# Patient Record
Sex: FEMALE | Race: BLACK OR AFRICAN AMERICAN | NOT HISPANIC OR LATINO | Employment: OTHER | URBAN - METROPOLITAN AREA
[De-identification: names, ages, dates, MRNs, and addresses within clinical notes are randomized per-mention and may not be internally consistent; named-entity substitution may affect disease eponyms.]

---

## 2017-01-03 ENCOUNTER — TELEPHONE (OUTPATIENT)
Dept: NEPHROLOGY | Facility: CLINIC | Age: 62
End: 2017-01-03

## 2017-01-03 DIAGNOSIS — F41.9 ANXIETY: Chronic | ICD-10-CM

## 2017-01-03 NOTE — TELEPHONE ENCOUNTER
----- Message from Deysi Mora sent at 1/3/2017  2:43 PM CST -----  Dottie with [facility] at [phone]//[facility] is calling to check on the status of a refill request for med//Hydreledine 50mg tabs//pt's insurance is requesting a 90 day supply//please call//racquel/jennie

## 2017-01-04 ENCOUNTER — TELEPHONE (OUTPATIENT)
Dept: INTERNAL MEDICINE | Facility: CLINIC | Age: 62
End: 2017-01-04

## 2017-01-04 RX ORDER — BUSPIRONE HYDROCHLORIDE 10 MG/1
TABLET ORAL
Qty: 90 TABLET | Refills: 5 | Status: SHIPPED | OUTPATIENT
Start: 2017-01-04 | End: 2017-01-05 | Stop reason: SDUPTHER

## 2017-01-05 ENCOUNTER — OFFICE VISIT (OUTPATIENT)
Dept: INTERNAL MEDICINE | Facility: CLINIC | Age: 62
End: 2017-01-05
Payer: MEDICARE

## 2017-01-05 VITALS
BODY MASS INDEX: 29.9 KG/M2 | TEMPERATURE: 98 F | HEART RATE: 88 BPM | WEIGHT: 162.5 LBS | SYSTOLIC BLOOD PRESSURE: 150 MMHG | DIASTOLIC BLOOD PRESSURE: 96 MMHG | HEIGHT: 62 IN | OXYGEN SATURATION: 99 %

## 2017-01-05 DIAGNOSIS — F33.42 MAJOR DEPRESSIVE DISORDER, RECURRENT, IN FULL REMISSION: Chronic | ICD-10-CM

## 2017-01-05 DIAGNOSIS — J01.80 OTHER ACUTE SINUSITIS, RECURRENCE NOT SPECIFIED: ICD-10-CM

## 2017-01-05 DIAGNOSIS — J40 BRONCHITIS: Primary | ICD-10-CM

## 2017-01-05 DIAGNOSIS — I10 ESSENTIAL HYPERTENSION: Chronic | ICD-10-CM

## 2017-01-05 DIAGNOSIS — N18.30 CHRONIC KIDNEY DISEASE, STAGE 3: Chronic | ICD-10-CM

## 2017-01-05 DIAGNOSIS — D84.9 IMMUNOCOMPROMISED PATIENT: ICD-10-CM

## 2017-01-05 DIAGNOSIS — F41.9 ANXIETY: Chronic | ICD-10-CM

## 2017-01-05 DIAGNOSIS — Z94.1 HEART TRANSPLANTED: Chronic | ICD-10-CM

## 2017-01-05 PROCEDURE — 99999 PR PBB SHADOW E&M-EST. PATIENT-LVL V: CPT | Mod: PBBFAC,,, | Performed by: PHYSICIAN ASSISTANT

## 2017-01-05 PROCEDURE — 3080F DIAST BP >= 90 MM HG: CPT | Mod: S$GLB,,, | Performed by: PHYSICIAN ASSISTANT

## 2017-01-05 PROCEDURE — 1159F MED LIST DOCD IN RCRD: CPT | Mod: S$GLB,,, | Performed by: PHYSICIAN ASSISTANT

## 2017-01-05 PROCEDURE — 99499 UNLISTED E&M SERVICE: CPT | Mod: S$GLB,,, | Performed by: PHYSICIAN ASSISTANT

## 2017-01-05 PROCEDURE — 99214 OFFICE O/P EST MOD 30 MIN: CPT | Mod: 25,S$GLB,, | Performed by: PHYSICIAN ASSISTANT

## 2017-01-05 PROCEDURE — 3077F SYST BP >= 140 MM HG: CPT | Mod: S$GLB,,, | Performed by: PHYSICIAN ASSISTANT

## 2017-01-05 PROCEDURE — 96372 THER/PROPH/DIAG INJ SC/IM: CPT | Mod: S$GLB,,, | Performed by: PHYSICIAN ASSISTANT

## 2017-01-05 RX ORDER — CYCLOSPORINE 100 MG/1
100 CAPSULE, LIQUID FILLED ORAL DAILY
Qty: 90 CAPSULE | Refills: 3 | Status: SHIPPED | OUTPATIENT
Start: 2017-01-05 | End: 2017-09-25 | Stop reason: SDUPTHER

## 2017-01-05 RX ORDER — CYCLOSPORINE 25 MG/1
CAPSULE, LIQUID FILLED ORAL
Qty: 270 CAPSULE | Refills: 3 | Status: SHIPPED | OUTPATIENT
Start: 2017-01-05 | End: 2017-09-25 | Stop reason: SDUPTHER

## 2017-01-05 RX ORDER — PROMETHAZINE HYDROCHLORIDE AND DEXTROMETHORPHAN HYDROBROMIDE 6.25; 15 MG/5ML; MG/5ML
5 SYRUP ORAL 3 TIMES DAILY PRN
Qty: 240 ML | Refills: 0 | Status: SHIPPED | OUTPATIENT
Start: 2017-01-05 | End: 2017-01-15

## 2017-01-05 RX ORDER — BUSPIRONE HYDROCHLORIDE 10 MG/1
TABLET ORAL
Qty: 90 TABLET | Refills: 0 | Status: SHIPPED | OUTPATIENT
Start: 2017-01-05 | End: 2017-04-19 | Stop reason: SDUPTHER

## 2017-01-05 RX ORDER — METOPROLOL SUCCINATE 25 MG/1
25 TABLET, EXTENDED RELEASE ORAL DAILY
Qty: 90 TABLET | Refills: 3 | Status: SHIPPED | OUTPATIENT
Start: 2017-01-05 | End: 2017-01-06 | Stop reason: SDUPTHER

## 2017-01-05 RX ORDER — AMOXICILLIN AND CLAVULANATE POTASSIUM 875; 125 MG/1; MG/1
1 TABLET, FILM COATED ORAL 2 TIMES DAILY
Qty: 20 TABLET | Refills: 0 | Status: SHIPPED | OUTPATIENT
Start: 2017-01-05 | End: 2017-01-15

## 2017-01-05 RX ORDER — METHYLPREDNISOLONE ACETATE 80 MG/ML
80 INJECTION, SUSPENSION INTRA-ARTICULAR; INTRALESIONAL; INTRAMUSCULAR; SOFT TISSUE
Status: COMPLETED | OUTPATIENT
Start: 2017-01-05 | End: 2017-01-05

## 2017-01-05 RX ADMIN — METHYLPREDNISOLONE ACETATE 80 MG: 80 INJECTION, SUSPENSION INTRA-ARTICULAR; INTRALESIONAL; INTRAMUSCULAR; SOFT TISSUE at 01:01

## 2017-01-05 NOTE — MR AVS SNAPSHOT
[facility]  [address] [facility]  [place] [address]  Phone: [phone]  Fax: [phone]                  Nadia Damon   2017 1:00 PM   Office Visit    Description:  Female : 1955   Provider:  Omar Box III PAStewartC   Department:  [facility]           Reason for Visit     URI           Diagnoses this Visit        Comments    Heart transplanted    -  Primary     Chronic kidney disease, stage 3         Immunocompromised patient         Other acute sinusitis, recurrence not specified         Bronchitis         Anxiety         Major depressive disorder, recurrent, in full remission                To Do List           Future Appointments        Provider Department Dept Phone    2017 10:00 AM Paxton Vasques OD [facility] - Ophthalmology [phone]      Goals (5 Years of Data)     None       These Medications        Disp Refills Start End    promethazine-dextromethorphan (PROMETHAZINE-DM) 6.25-15 mg/5 mL Syrp 240 mL 0 2017 1/15/2017    Take 5 mLs by mouth 3 (three) times daily as needed. - Oral    Pharmacy: [id number] [place] - [address] [facility] Ph #: [phone]       amoxicillin-clavulanate 875-125mg (AUGMENTIN) 875-125 mg per tablet 20 tablet 0 2017 1/15/2017    Take 1 tablet by mouth 2 (two) times daily. - Oral    Pharmacy: [facility] [address] [place], [place] [address], [place] - [address] [facility] Ph #: [phone]       busPIRone (BUSPAR) 10 MG tablet 90 tablet 0 2017     TAKE 1 TABLET (10 MG TOTAL) BY MOUTH 3 (THREE) TIMES DAILY.    Pharmacy: [id number] [place], [place] [address] - [address] [facility] Ph #: [phone]         [facility]     [facility] -  Assistance  Registered nurses in the [facility] Center provide clinical advisement, health education, appointment  booking, and other advisory services.  Call for this free service at [phone].             Medications           Message regarding Medications     Verify the changes and/or additions to your medication regime listed below are the same as discussed with your clinician today.  If any of these changes or additions are incorrect, please notify your healthcare provider.        START taking these NEW medications        Refills    promethazine-dextromethorphan (PROMETHAZINE-DM) 6.25-15 mg/5 mL Syrp 0    Sig: Take 5 mLs by mouth 3 (three) times daily as needed.    Class: Normal    Route: Oral    amoxicillin-clavulanate 875-125mg (AUGMENTIN) 875-125 mg per tablet 0    Sig: Take 1 tablet by mouth 2 (two) times daily.    Class: Normal    Route: Oral      These medications were administered today        Dose Freq    methylPREDNISolone acetate injection 80 mg 80 mg [facility] 1 time    Sig: Inject 1 mL (80 mg total) into the muscle one time.    Class: Normal    Route: Intramuscular           Verify that the below list of medications is an accurate representation of the medications you are currently taking.  If none reported, the list may be blank. If incorrect, please contact your healthcare provider. Carry this list with you in case of emergency.           Current Medications     amoxicillin-clavulanate 875-125mg (AUGMENTIN) 875-125 mg per tablet Take 1 tablet by mouth 2 (two) times daily.    ascorbic acid (VITAMIN C) 1000 MG tablet Take 1,000 mg by mouth once daily.    aspirin (ECOTRIN) 81 MG EC tablet Take 1 tablet (81 mg total) by mouth once daily.    baclofen (LIORESAL) 10 MG tablet Take 1 tablet (10 mg total) by mouth 3 (three) times daily as needed (muscle spasm).    busPIRone (BUSPAR) 10 MG tablet TAKE 1 TABLET (10 MG TOTAL) BY MOUTH 3 (THREE) TIMES DAILY.    calcium carb,cit-mag12-vit D3 500-250-200 mg-mg-unit Tab Take by mouth.    cycloSPORINE modified, NEORAL, (GENGRAF) 100 MG capsule Take 1 capsule (100 mg  total) by mouth once daily.    cycloSPORINE modified, NEORAL, 25 MG capsule TAKE 3 CAPSULES ONCE A DAY    diclofenac sodium (VOLTAREN) 1 % Gel Apply 2 g topically once daily.    docusate sodium (COLACE) 100 MG capsule Take 100 mg by mouth 2 (two) times daily.    duloxetine (CYMBALTA) 30 MG capsule TAKE 1 CAPSULE (30 MG TOTAL) BY MOUTH ONCE DAILY.    ferrous gluconate (FERGON) 324 MG tablet TAKE 1 TABLET (324 MG TOTAL) BY MOUTH DAILY WITH BREAKFAST.    FLUCELVAX QUAD 7704-1313, PF, 60 mcg (15 mcg x 4)/0.5 mL Syrg TO BE ADMINISTERED BY PHARMACIST FOR IMMUNIZATION    hydrALAZINE (APRESOLINE) 50 MG tablet Take 1 and 1/2 (75 mg) po tid    hydrochlorothiazide (HYDRODIURIL) 12.5 MG Tab     metoprolol succinate (TOPROL-XL) 25 MG 24 hr tablet Take 1 tablet (25 mg total) by mouth once daily.    mirabegron (MYRBETRIQ) 25 mg Tb24 ER tablet Take 25 mg by mouth once daily.    multivitamin capsule Take 1 capsule by mouth once daily.    omeprazole (PRILOSEC) 10 MG capsule TAKE 1 CAPSULE (10 MG TOTAL) BY MOUTH ONCE DAILY.    omeprazole (PRILOSEC) 10 MG capsule TAKE 1 CAPSULE (10 MG TOTAL) BY MOUTH ONCE DAILY.    pregabalin (LYRICA) 50 MG capsule Take 1 capsule (50 mg total) by mouth 2 (two) times daily.    promethazine (PHENERGAN) 12.5 MG Tab Take 12.5 mg by mouth 4 (four) times daily as needed.    promethazine-dextromethorphan (PROMETHAZINE-DM) 6.25-15 mg/5 mL Syrp Take 5 mLs by mouth 3 (three) times daily as needed.    temazepam (RESTORIL) 30 mg capsule Take 1 capsule (30 mg total) by mouth nightly as needed for Insomnia.    vitamin E 400 UNIT capsule Take 400 Units by mouth once daily.    zolpidem (AMBIEN) 10 mg Tab Take 1 tablet (10 mg total) by mouth every evening.           Clinical Reference Information           Vital Signs - Last Recorded  Most recent update: 1/5/2017  1:20 PM by Sonali Garcia MA    BP Pulse Temp Ht    (!) 150/96 (BP Location: Left arm, Patient Position: Sitting, BP Method: Manual) 88 98 °F (36.7  "°C) (Tympanic) 5' 2" (1.575 m)    Wt LMP SpO2 BMI    73.7 kg (162 lb 7.7 oz) 06/20/1983 (Within Years) 99% 29.72 kg/m2      Blood Pressure          Most Recent Value    BP  (!)  150/96      Allergies as of 1/5/2017     Lisinopril    Atorvastatin    Hydrocodone      Immunizations Administered on Date of Encounter - 1/5/2017     None      Orders Placed During Today's Visit     Future Labs/Procedures Expected by Expires    Basic metabolic panel  1/5/2017 1/5/2017      Maintenance Dialysis History     Patient has no recorded history of maintenance dialysis.      Transplant Information        Txp Date Organ Coordinator Care Team    5/27/1993 [facility]       [facility] Sign-Up     Activating your [facility] account is as easy as 1-2-3!     1) Visit [email or web address], select Sign Up Now, enter this activation code and your date of birth, then select Next.  [email or web address]  Expires: 2/19/2017  1:42 PM      2) Create a username and password to use when you visit [facility] in the future and select a security question in case you lose your password and select Next.    3) Enter your e-mail address and click Sign Up!    Additional Information  If you have questions, please e-mail [email or web address] or call [phone] to talk to our [facility] staff. Remember, [facility] is NOT to be used for urgent needs. For medical emergencies, dial 911.         Instructions      Continue with antibiotics and new medicines until gone. May take tylenol PRN fever. Increase fluids and rest. Call the clinic if not better in 3 to 5 days. Suggest togo to the Emergency Room if symptoms get much worse. Otherwise follow up with your PCP as scheduled.        "

## 2017-01-05 NOTE — PROGRESS NOTES
Subjective:       Patient ID: Naida Damon is a 61 y.o. female.    Chief Complaint: URI    Cough   This is a new problem. The current episode started in the past 7 days. The problem has been unchanged. The problem occurs constantly. The cough is productive of sputum. Associated symptoms include chills, nasal congestion, postnasal drip, rhinorrhea, a sore throat, shortness of breath and wheezing. Pertinent negatives include no chest pain or fever. Nothing aggravates the symptoms. She has tried nothing for the symptoms.     Past Medical History   Diagnosis Date    Anxiety     Arthritis     Chronic kidney disease     Coronary artery disease     Depression     Fibromyalgia      on Lyrica    Heart failure      native heart cardiomyopathy    Heart transplanted 1993     due to cardiomyopathy    Hypertension     Immune disorder      anti rejection meds    Obesity     Shingles 2003 approx     left leg    Trouble in sleeping     Urinary incontinence        Past Surgical History   Procedure Laterality Date    Cardiac pacemaker removal Left 06/26/14     Pacer defirillator removed. Put in 1993 aat time of heart transplant    Bladder surgery  2015 approx     mesh - Dr Everett then 2nd reconstructive sx Dr Onofre    Heart transplant  1993    Carpal tunnel release Left 03/03/15     Dr. Hall    Hysterectomy  1983     vag hyst /LSO     Hernia repair Right 1971 approx     Inguinal    Breast surgery Left 9/28/15     Bx - benign    Breast surgery Right 12/2015     Bx benign       Family History   Problem Relation Age of Onset    Cancer Mother 38     breast    Heart disease Maternal Grandmother     Cataracts Cousin     Diabetes Neg Hx     Stroke Neg Hx     Kidney disease Neg Hx     Hypertension Neg Hx     Asthma Neg Hx     COPD Neg Hx        Social History     Social History    Marital status:      Spouse name: N/A    Number of children: 1    Years of education: N/A     Occupational  History    sitter      Social History Main Topics    Smoking status: Never Smoker    Smokeless tobacco: Never Used    Alcohol use No    Drug use: No    Sexual activity: No     Other Topics Concern    Not on file     Social History Narrative    Single. 2 children , 1  at 30 yoa   strep throat -  pneumonia and renal complications after not completing course of AB. Other child lives in [place], [place]. Has a cousin locally that could help in an emergency. 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 Caffeine intake =- 1 cola a day. No coffee, + occasional tea, avoids caffeine especially at night. Still drives. She does not have a Living Will or Advanced directive.        Review of patient's allergies indicates:   Allergen Reactions    Lisinopril Swelling and Rash    Atorvastatin Swelling    Hydrocodone Nausea And Vomiting         Current Outpatient Prescriptions:     ascorbic acid (VITAMIN C) 1000 MG tablet, Take 1,000 mg by mouth once daily., Disp: , Rfl:     aspirin (ECOTRIN) 81 MG EC tablet, Take 1 tablet (81 mg total) by mouth once daily., Disp: 30 tablet, Rfl: 11    baclofen (LIORESAL) 10 MG tablet, Take 1 tablet (10 mg total) by mouth 3 (three) times daily as needed (muscle spasm)., Disp: 30 tablet, Rfl: 5    busPIRone (BUSPAR) 10 MG tablet, TAKE 1 TABLET (10 MG TOTAL) BY MOUTH 3 (THREE) TIMES DAILY., Disp: 90 tablet, Rfl: 0    calcium carb,cit-mag12-vit D3 500-250-200 mg-mg-unit Tab, Take by mouth., Disp: , Rfl:     cycloSPORINE modified, NEORAL, (GENGRAF) 100 MG capsule, Take 1 capsule (100 mg total) by mouth once daily., Disp: 90 capsule, Rfl: 3    cycloSPORINE modified, NEORAL, 25 MG capsule, TAKE 3 CAPSULES ONCE A DAY, Disp: 270 capsule, Rfl: 3    docusate sodium (COLACE) 100 MG capsule, Take 100 mg by mouth 2 (two) times daily., Disp: , Rfl:     duloxetine (CYMBALTA) 30 MG capsule, TAKE 1 CAPSULE (30 MG TOTAL) BY MOUTH ONCE DAILY., Disp: 30 capsule, Rfl:  5    ferrous gluconate (FERGON) 324 MG tablet, TAKE 1 TABLET (324 MG TOTAL) BY MOUTH DAILY WITH BREAKFAST., Disp: , Rfl: 11    hydrALAZINE (APRESOLINE) 50 MG tablet, Take 1 and 1/2 (75 mg) po tid, Disp: 135 tablet, Rfl: 10    hydrochlorothiazide (HYDRODIURIL) 12.5 MG Tab, , Disp: , Rfl:     metoprolol succinate (TOPROL-XL) 25 MG 24 hr tablet, Take 1 tablet (25 mg total) by mouth once daily., Disp: 30 tablet, Rfl: 11    multivitamin capsule, Take 1 capsule by mouth once daily., Disp: , Rfl:     omeprazole (PRILOSEC) 10 MG capsule, TAKE 1 CAPSULE (10 MG TOTAL) BY MOUTH ONCE DAILY., Disp: 30 capsule, Rfl: 5    pregabalin (LYRICA) 50 MG capsule, Take 1 capsule (50 mg total) by mouth 2 (two) times daily., Disp: 60 capsule, Rfl: 5    temazepam (RESTORIL) 30 mg capsule, Take 1 capsule (30 mg total) by mouth nightly as needed for Insomnia., Disp: 30 capsule, Rfl: 5    zolpidem (AMBIEN) 10 mg Tab, Take 1 tablet (10 mg total) by mouth every evening., Disp: 30 tablet, Rfl: 5    amoxicillin-clavulanate 875-125mg (AUGMENTIN) 875-125 mg per tablet, Take 1 tablet by mouth 2 (two) times daily., Disp: 20 tablet, Rfl: 0    diclofenac sodium (VOLTAREN) 1 % Gel, Apply 2 g topically once daily., Disp: 60 g, Rfl: 1    FLUCELVAX QUAD 9263-3967, PF, 60 mcg (15 mcg x 4)/0.5 mL Syrg, TO BE ADMINISTERED BY PHARMACIST FOR IMMUNIZATION, Disp: , Rfl: 0    mirabegron (MYRBETRIQ) 25 mg Tb24 ER tablet, Take 25 mg by mouth once daily., Disp: , Rfl:     omeprazole (PRILOSEC) 10 MG capsule, TAKE 1 CAPSULE (10 MG TOTAL) BY MOUTH ONCE DAILY., Disp: 30 capsule, Rfl: 5    promethazine (PHENERGAN) 12.5 MG Tab, Take 12.5 mg by mouth 4 (four) times daily as needed., Disp: , Rfl:     promethazine-dextromethorphan (PROMETHAZINE-DM) 6.25-15 mg/5 mL Syrp, Take 5 mLs by mouth 3 (three) times daily as needed., Disp: 240 mL, Rfl: 0    vitamin E 400 UNIT capsule, Take 400 Units by mouth once daily., Disp: , Rfl:   No current facility-administered  "medications for this visit.     Visit Vitals    BP (!) 150/96 (BP Location: Left arm, Patient Position: Sitting, BP Method: Manual)    Pulse 88    Temp 98 °F (36.7 °C) (Tympanic)    Ht 5' 2" (1.575 m)    Wt 73.7 kg (162 lb 7.7 oz)    LMP 06/20/1983 (Within Years)    SpO2 99%    BMI 29.72 kg/m2     Review of Systems   Constitutional: Positive for chills. Negative for diaphoresis, fatigue and fever.   HENT: Positive for congestion, postnasal drip, rhinorrhea, sinus pressure and sore throat.    Respiratory: Positive for cough, shortness of breath and wheezing. Negative for chest tightness.    Cardiovascular: Negative for chest pain.       Objective:      Physical Exam   Constitutional: She appears well-developed and well-nourished. No distress.   HENT:   Head: Normocephalic and atraumatic.   Right Ear: External ear normal.   Left Ear: External ear normal.   Nose: Mucosal edema and rhinorrhea present.   Mouth/Throat: Posterior oropharyngeal edema and posterior oropharyngeal erythema present. No oropharyngeal exudate or tonsillar abscesses.   Neck: Neck supple.   Cardiovascular: Normal rate and regular rhythm.  Exam reveals no gallop and no friction rub.    No murmur heard.  Pulmonary/Chest: Effort normal. No respiratory distress. She has wheezes. She has no rales. She exhibits no tenderness.   Abdominal: Soft. There is no tenderness.   Lymphadenopathy:     She has no cervical adenopathy.   Skin: She is not diaphoretic.   Nursing note and vitals reviewed.      Assessment:       1. Bronchitis    2. Heart transplanted    3. Chronic kidney disease, stage 3    4. Immunocompromised patient    5. Other acute sinusitis, recurrence not specified    6. Anxiety    7. Major depressive disorder, recurrent, in full remission        Plan:       Bronchitis    Heart transplanted  -     Basic metabolic panel; Future; Expected date: 1/5/17    Chronic kidney disease, stage 3  -     Basic metabolic panel; Future; Expected date: " 1/5/17    Immunocompromised patient    Other acute sinusitis, recurrence not specified    Anxiety  -     busPIRone (BUSPAR) 10 MG tablet; TAKE 1 TABLET (10 MG TOTAL) BY MOUTH 3 (THREE) TIMES DAILY.  Dispense: 90 tablet; Refill: 0    Major depressive disorder, recurrent, in full remission    Other orders  -     methylPREDNISolone acetate injection 80 mg; Inject 1 mL (80 mg total) into the muscle one time.  -     promethazine-dextromethorphan (PROMETHAZINE-DM) 6.25-15 mg/5 mL Syrp; Take 5 mLs by mouth 3 (three) times daily as needed.  Dispense: 240 mL; Refill: 0  -     amoxicillin-clavulanate 875-125mg (AUGMENTIN) 875-125 mg per tablet; Take 1 tablet by mouth 2 (two) times daily.  Dispense: 20 tablet; Refill: 0

## 2017-01-05 NOTE — TELEPHONE ENCOUNTER
----- Message from Emma Canchola sent at 1/5/2017  2:42 PM CST -----  Contact: tanesha/renetta  Would like to speak to nurse about rx for pt. Please call back at [phone]. Thanks///cdb

## 2017-01-06 DIAGNOSIS — I10 ESSENTIAL HYPERTENSION: Chronic | ICD-10-CM

## 2017-01-06 RX ORDER — METOPROLOL SUCCINATE 25 MG/1
25 TABLET, EXTENDED RELEASE ORAL DAILY
Qty: 90 TABLET | Refills: 3 | Status: SHIPPED | OUTPATIENT
Start: 2017-01-06 | End: 2017-01-10 | Stop reason: SDUPTHER

## 2017-01-09 ENCOUNTER — TELEPHONE (OUTPATIENT)
Dept: TRANSPLANT | Facility: CLINIC | Age: 62
End: 2017-01-09

## 2017-01-09 DIAGNOSIS — Z94.1 STATUS POST HEART TRANSPLANT: ICD-10-CM

## 2017-01-09 DIAGNOSIS — T86.20 COMPLICATION OF HEART TRANSPLANT, UNSPECIFIED COMPLICATION: ICD-10-CM

## 2017-01-09 DIAGNOSIS — E78.5 HYPERLIPIDEMIA, UNSPECIFIED HYPERLIPIDEMIA TYPE: Primary | ICD-10-CM

## 2017-01-09 DIAGNOSIS — Z79.52 LONG TERM CURRENT USE OF SYSTEMIC STEROIDS: ICD-10-CM

## 2017-01-10 DIAGNOSIS — M79.7 FIBROMYALGIA: ICD-10-CM

## 2017-01-10 DIAGNOSIS — I10 ESSENTIAL HYPERTENSION: Chronic | ICD-10-CM

## 2017-01-10 DIAGNOSIS — M62.838 MUSCLE SPASMS OF BOTH LOWER EXTREMITIES: ICD-10-CM

## 2017-01-10 DIAGNOSIS — F51.01 PRIMARY INSOMNIA: ICD-10-CM

## 2017-01-10 RX ORDER — ZOLPIDEM TARTRATE 10 MG/1
10 TABLET ORAL NIGHTLY
Qty: 30 TABLET | Refills: 5 | Status: SHIPPED | OUTPATIENT
Start: 2017-01-10 | End: 2017-07-08 | Stop reason: SDUPTHER

## 2017-01-10 RX ORDER — METOPROLOL SUCCINATE 25 MG/1
25 TABLET, EXTENDED RELEASE ORAL DAILY
Qty: 90 TABLET | Refills: 3 | Status: SHIPPED | OUTPATIENT
Start: 2017-01-10 | End: 2017-12-08 | Stop reason: SDUPTHER

## 2017-01-10 RX ORDER — BACLOFEN 10 MG/1
10 TABLET ORAL 3 TIMES DAILY PRN
Qty: 30 TABLET | Refills: 5 | Status: SHIPPED | OUTPATIENT
Start: 2017-01-10 | End: 2017-01-17 | Stop reason: SDUPTHER

## 2017-01-10 RX ORDER — DULOXETIN HYDROCHLORIDE 30 MG/1
CAPSULE, DELAYED RELEASE ORAL
Qty: 30 CAPSULE | Refills: 5 | Status: SHIPPED | OUTPATIENT
Start: 2017-01-10 | End: 2017-04-19 | Stop reason: SDUPTHER

## 2017-01-17 ENCOUNTER — TELEPHONE (OUTPATIENT)
Dept: INTERNAL MEDICINE | Facility: CLINIC | Age: 62
End: 2017-01-17

## 2017-01-17 DIAGNOSIS — M62.838 MUSCLE SPASMS OF BOTH LOWER EXTREMITIES: ICD-10-CM

## 2017-01-17 RX ORDER — BACLOFEN 10 MG/1
10 TABLET ORAL 3 TIMES DAILY PRN
Qty: 30 TABLET | Refills: 5 | Status: SHIPPED | OUTPATIENT
Start: 2017-01-17 | End: 2017-03-02 | Stop reason: SDUPTHER

## 2017-01-17 NOTE — TELEPHONE ENCOUNTER
----- Message from Ayla Ward sent at 1/17/2017  9:53 AM CST -----  Contact: pt  Pt is returning Nurse staff call. Pt call back [phone] thanks

## 2017-01-17 NOTE — TELEPHONE ENCOUNTER
----- Message from Emma Forman sent at 1/17/2017  8:32 AM CST -----  Requesting a Rx refill for Baclofen. States she is having legs cramps really bad.    Pt use..  [id number] [place] - [address] [facility]  [address] [address]  [place] [address]  Phone: [phone] Fax: [phone]    Please adv/call [phone].//thanks. cw

## 2017-01-17 NOTE — TELEPHONE ENCOUNTER
Pt is requesting what she could possibly do to keep her legs from cramping. Kidney doctor gave her a lot of stuff she can't have and is not too sure on trying pickles due to her BP. Cramps are getting really bad. Please advise.

## 2017-01-23 ENCOUNTER — LAB VISIT (OUTPATIENT)
Dept: LAB | Facility: HOSPITAL | Age: 62
End: 2017-01-23
Attending: INTERNAL MEDICINE
Payer: MEDICARE

## 2017-01-23 DIAGNOSIS — K21.9 GASTROESOPHAGEAL REFLUX DISEASE WITHOUT ESOPHAGITIS: Chronic | ICD-10-CM

## 2017-01-23 DIAGNOSIS — Z94.1 STATUS POST HEART TRANSPLANT: ICD-10-CM

## 2017-01-23 DIAGNOSIS — E78.5 HYPERLIPIDEMIA, UNSPECIFIED HYPERLIPIDEMIA TYPE: ICD-10-CM

## 2017-01-23 DIAGNOSIS — F51.01 PRIMARY INSOMNIA: ICD-10-CM

## 2017-01-23 DIAGNOSIS — T86.20 COMPLICATION OF HEART TRANSPLANT, UNSPECIFIED COMPLICATION: ICD-10-CM

## 2017-01-23 DIAGNOSIS — Z79.52 LONG TERM CURRENT USE OF SYSTEMIC STEROIDS: ICD-10-CM

## 2017-01-23 LAB
ALBUMIN SERPL BCP-MCNC: 3.2 G/DL
ALP SERPL-CCNC: 105 U/L
ALT SERPL W/O P-5'-P-CCNC: 30 U/L
ANION GAP SERPL CALC-SCNC: 7 MMOL/L
AST SERPL-CCNC: 57 U/L
BASOPHILS # BLD AUTO: 0.01 K/UL
BASOPHILS NFR BLD: 0.3 %
BILIRUB SERPL-MCNC: 0.6 MG/DL
BNP SERPL-MCNC: 132 PG/ML
BUN SERPL-MCNC: 35 MG/DL
CALCIUM SERPL-MCNC: 9.3 MG/DL
CHLORIDE SERPL-SCNC: 100 MMOL/L
CHOLEST/HDLC SERPL: 3.1 {RATIO}
CO2 SERPL-SCNC: 28 MMOL/L
CREAT SERPL-MCNC: 2.1 MG/DL
DIFFERENTIAL METHOD: ABNORMAL
EOSINOPHIL # BLD AUTO: 0 K/UL
EOSINOPHIL NFR BLD: 1 %
ERYTHROCYTE [DISTWIDTH] IN BLOOD BY AUTOMATED COUNT: 14.3 %
EST. GFR  (AFRICAN AMERICAN): 28.5 ML/MIN/1.73 M^2
EST. GFR  (NON AFRICAN AMERICAN): 24.7 ML/MIN/1.73 M^2
GLUCOSE SERPL-MCNC: 89 MG/DL
HCT VFR BLD AUTO: 30.5 %
HDL/CHOLESTEROL RATIO: 32.7 %
HDLC SERPL-MCNC: 159 MG/DL
HDLC SERPL-MCNC: 52 MG/DL
HGB BLD-MCNC: 10 G/DL
LDLC SERPL CALC-MCNC: 90.6 MG/DL
LYMPHOCYTES # BLD AUTO: 1.1 K/UL
LYMPHOCYTES NFR BLD: 36.3 %
MAGNESIUM SERPL-MCNC: 2 MG/DL
MCH RBC QN AUTO: 27.9 PG
MCHC RBC AUTO-ENTMCNC: 32.8 %
MCV RBC AUTO: 85 FL
MONOCYTES # BLD AUTO: 0.3 K/UL
MONOCYTES NFR BLD: 11.2 %
NEUTROPHILS # BLD AUTO: 1.5 K/UL
NEUTROPHILS NFR BLD: 50.9 %
NONHDLC SERPL-MCNC: 107 MG/DL
PLATELET # BLD AUTO: 302 K/UL
PMV BLD AUTO: 9.7 FL
POTASSIUM SERPL-SCNC: 4.4 MMOL/L
PROT SERPL-MCNC: 7.9 G/DL
RBC # BLD AUTO: 3.59 M/UL
SODIUM SERPL-SCNC: 135 MMOL/L
TRIGL SERPL-MCNC: 82 MG/DL
WBC # BLD AUTO: 3.03 K/UL

## 2017-01-23 PROCEDURE — 86832 HLA CLASS I HIGH DEFIN QUAL: CPT

## 2017-01-23 PROCEDURE — 80158 DRUG ASSAY CYCLOSPORINE: CPT

## 2017-01-23 PROCEDURE — 83735 ASSAY OF MAGNESIUM: CPT

## 2017-01-23 PROCEDURE — 83880 ASSAY OF NATRIURETIC PEPTIDE: CPT

## 2017-01-23 PROCEDURE — 86832 HLA CLASS I HIGH DEFIN QUAL: CPT | Mod: 91

## 2017-01-23 PROCEDURE — 85025 COMPLETE CBC W/AUTO DIFF WBC: CPT

## 2017-01-23 PROCEDURE — 80053 COMPREHEN METABOLIC PANEL: CPT

## 2017-01-23 PROCEDURE — 80061 LIPID PANEL: CPT

## 2017-01-23 PROCEDURE — 36415 COLL VENOUS BLD VENIPUNCTURE: CPT | Mod: PO

## 2017-01-23 PROCEDURE — 86977 RBC SERUM PRETX INCUBJ/INHIB: CPT

## 2017-01-23 PROCEDURE — 86833 HLA CLASS II HIGH DEFIN QUAL: CPT | Mod: 91

## 2017-01-23 RX ORDER — TEMAZEPAM 30 MG/1
30 CAPSULE ORAL NIGHTLY PRN
Qty: 30 CAPSULE | Refills: 5 | Status: SHIPPED | OUTPATIENT
Start: 2017-01-23 | End: 2017-07-12 | Stop reason: SDUPTHER

## 2017-01-23 RX ORDER — OMEPRAZOLE 10 MG/1
CAPSULE, DELAYED RELEASE ORAL
Qty: 30 CAPSULE | Refills: 5 | Status: SHIPPED | OUTPATIENT
Start: 2017-01-23 | End: 2017-03-02 | Stop reason: SDUPTHER

## 2017-01-24 LAB — CYCLOSPORINE BLD LC/MS/MS-MCNC: 64 NG/ML

## 2017-01-25 LAB
CLASS I ANTIBODY COMMENTS - LUMINEX: NORMAL
CLASS II ANTIBODY COMMENTS - LUMINEX: NORMAL
CPRA %: 0
CPRA %: 0
DSA1 TESTING DATE: NORMAL
DSA2 TESTING DATE: NORMAL
SERUM COLLECTION DT - LUMINEX CLASS I: NORMAL
SERUM COLLECTION DT - LUMINEX CLASS II: NORMAL

## 2017-01-27 LAB
C1Q1 TESTING DATE: NORMAL
C1Q2 TESTING DATE: NORMAL
CLASS I ANTIBODY COMMENTS - LUMINEX: NORMAL
CLASS II ANTIBODY COMMENTS - LUMINEX: NORMAL
SERUM COLLECTION DT - LUMINEX CLASS I: NORMAL
SERUM COLLECTION DT - LUMINEX CLASS II: NORMAL

## 2017-02-27 DIAGNOSIS — F41.9 ANXIETY: Chronic | ICD-10-CM

## 2017-03-01 RX ORDER — BUSPIRONE HYDROCHLORIDE 10 MG/1
TABLET ORAL
Qty: 90 TABLET | Refills: 0 | OUTPATIENT
Start: 2017-03-01

## 2017-03-02 ENCOUNTER — OFFICE VISIT (OUTPATIENT)
Dept: INTERNAL MEDICINE | Facility: CLINIC | Age: 62
End: 2017-03-02
Payer: MEDICARE

## 2017-03-02 VITALS
HEIGHT: 62 IN | WEIGHT: 158.06 LBS | BODY MASS INDEX: 29.08 KG/M2 | TEMPERATURE: 98 F | HEART RATE: 87 BPM | OXYGEN SATURATION: 99 % | SYSTOLIC BLOOD PRESSURE: 162 MMHG | DIASTOLIC BLOOD PRESSURE: 101 MMHG

## 2017-03-02 DIAGNOSIS — Z01.419 WELL FEMALE EXAM WITH ROUTINE GYNECOLOGICAL EXAM: ICD-10-CM

## 2017-03-02 DIAGNOSIS — Z94.1 HEART TRANSPLANTED: ICD-10-CM

## 2017-03-02 DIAGNOSIS — K21.9 GASTROESOPHAGEAL REFLUX DISEASE WITHOUT ESOPHAGITIS: Chronic | ICD-10-CM

## 2017-03-02 DIAGNOSIS — Z28.39 IMMUNIZATION DEFICIENCY: ICD-10-CM

## 2017-03-02 DIAGNOSIS — N18.4 CKD (CHRONIC KIDNEY DISEASE), STAGE 4 (SEVERE): ICD-10-CM

## 2017-03-02 DIAGNOSIS — M62.838 MUSCLE SPASMS OF BOTH LOWER EXTREMITIES: ICD-10-CM

## 2017-03-02 DIAGNOSIS — M79.7 FIBROMYALGIA: ICD-10-CM

## 2017-03-02 DIAGNOSIS — I10 ESSENTIAL HYPERTENSION: Primary | ICD-10-CM

## 2017-03-02 DIAGNOSIS — R03.0 ELEVATED BLOOD PRESSURE READING: ICD-10-CM

## 2017-03-02 DIAGNOSIS — L98.9 SKIN LESION OF HAND: ICD-10-CM

## 2017-03-02 PROCEDURE — G0009 ADMIN PNEUMOCOCCAL VACCINE: HCPCS | Mod: S$GLB,,, | Performed by: FAMILY MEDICINE

## 2017-03-02 PROCEDURE — 3080F DIAST BP >= 90 MM HG: CPT | Mod: S$GLB,,, | Performed by: FAMILY MEDICINE

## 2017-03-02 PROCEDURE — 90670 PCV13 VACCINE IM: CPT | Mod: S$GLB,,, | Performed by: FAMILY MEDICINE

## 2017-03-02 PROCEDURE — 99214 OFFICE O/P EST MOD 30 MIN: CPT | Mod: S$GLB,,, | Performed by: FAMILY MEDICINE

## 2017-03-02 PROCEDURE — 1160F RVW MEDS BY RX/DR IN RCRD: CPT | Mod: S$GLB,,, | Performed by: FAMILY MEDICINE

## 2017-03-02 PROCEDURE — 3077F SYST BP >= 140 MM HG: CPT | Mod: S$GLB,,, | Performed by: FAMILY MEDICINE

## 2017-03-02 PROCEDURE — 99999 PR PBB SHADOW E&M-EST. PATIENT-LVL V: CPT | Mod: PBBFAC,,, | Performed by: FAMILY MEDICINE

## 2017-03-02 PROCEDURE — 99499 UNLISTED E&M SERVICE: CPT | Mod: S$GLB,,, | Performed by: FAMILY MEDICINE

## 2017-03-02 RX ORDER — OMEPRAZOLE 10 MG/1
CAPSULE, DELAYED RELEASE ORAL
Qty: 90 CAPSULE | Refills: 3 | Status: SHIPPED | OUTPATIENT
Start: 2017-03-02 | End: 2017-05-12 | Stop reason: SDUPTHER

## 2017-03-02 RX ORDER — BACLOFEN 10 MG/1
10 TABLET ORAL 3 TIMES DAILY PRN
Qty: 270 TABLET | Refills: 1 | Status: SHIPPED | OUTPATIENT
Start: 2017-03-02 | End: 2017-03-02 | Stop reason: SDUPTHER

## 2017-03-02 RX ORDER — PREGABALIN 50 MG/1
50 CAPSULE ORAL 2 TIMES DAILY
Qty: 180 CAPSULE | Refills: 1 | Status: SHIPPED | OUTPATIENT
Start: 2017-03-02 | End: 2017-04-19 | Stop reason: SDUPTHER

## 2017-03-02 RX ORDER — BACLOFEN 10 MG/1
10 TABLET ORAL 3 TIMES DAILY PRN
Qty: 90 TABLET | Refills: 1 | Status: SHIPPED | OUTPATIENT
Start: 2017-03-02 | End: 2017-07-03 | Stop reason: SDUPTHER

## 2017-03-02 NOTE — PROGRESS NOTES
Patient, Nadia Damon (MRN #[id number]), presented with a recent Estimated Glumerular Filtration Rate (EGFR) between 15 and 29 consistent with the definition of chronic kidney disease stage 4 (ICD10 - N18.4).    eGFR if    Date Value Ref Range Status   01/23/2017 28.5 (A) >60 mL/min/1.73 m^2 Final       The patient's chronic kidney disease stage 4 was monitored, evaluated, addressed and/or treated. This addendum to the medical record is made on 03/02/2017.

## 2017-03-02 NOTE — PROGRESS NOTES
Subjective:       Patient ID: Nadia Damon is a 61 y.o. female.    Chief Complaint: Medication Refill and Mass (on thumb)    HPI Comments: She is status post heart transplant due to cardiomyopathy, follow-up hypertension, chronic kidney disease stage 4 followed by  nephrology, esophageal reflux, insomnia, muscle spasms, lesion right thumb, abdominal swelling.  She is having increased muscle spasm.  She is now  Taking  baclofen 3 times a day.  She is advised against K+ replacement due to chronic kidney disease.    Medication Refill   Pertinent negatives include no abdominal pain, chest pain, congestion, coughing, fatigue, fever or headaches.   Head and Neck Mass: No fever, no headaches and no shortness of breath.    Review of Systems   Constitutional: Negative for appetite change, fatigue, fever and unexpected weight change.   HENT: Negative for congestion.    Eyes: Negative for visual disturbance.   Respiratory: Negative for cough, shortness of breath and wheezing.    Cardiovascular: Negative for chest pain and palpitations.   Gastrointestinal: Negative for abdominal pain.   Genitourinary: Negative for difficulty urinating and frequency.   Musculoskeletal:        Muscle spasm   Neurological: Negative for headaches.       Objective:      Physical Exam   Constitutional: She appears well-developed and well-nourished.   Eyes: Conjunctivae are normal.   Neck: Neck supple. No thyromegaly present.   Cardiovascular: Normal rate and regular rhythm.    No murmur heard.  She has a persistent split second heart sound   Pulmonary/Chest: Effort normal and breath sounds normal. No respiratory distress. She has no wheezes. She has no rales.   Abdominal: Soft. Bowel sounds are normal. She exhibits no mass. There is no tenderness. A hernia (she has a soft small nontender incisional ventral abdominal hernia) is present.   Lymphadenopathy:     She has no cervical adenopathy.   Neurological: She is alert.   Skin:   She has a small  corn type lesion of the thumb       Assessment:       1. Skin lesion of hand    2. Muscle spasms of both lower extremities and hands    3. Gastroesophageal reflux disease without esophagitis    4. Fibromyalgia    5. Immunization deficiency    6. Well female exam with routine gynecological exam        Plan:     blood pressure is elevated.  She is on Apresoline 75 mg 3 times a day, HCTZ 12.5 mg once a day.  She is scheduled see cardiology over the next few weeks and wants to defer medication adjustment to cardiology.  Baclofen has been refilled.  She had a pneumococcal vaccination years ago.  Prevnar 13 is given today.  Medication reviewed.  Avoid abdominal straining to ventral hernia.  Dermatology GYN referrals were done.  Follow-up in 6 weeks

## 2017-03-02 NOTE — MR AVS SNAPSHOT
[facility]  [address]  [place] [address]  Phone: [phone]  Fax: [phone]                  Nadia Damon   3/2/2017 2:30 PM   Office Visit    Description:  Female : 1955   Provider:  Richie Cassidy MD   Department:  [facility]           Reason for Visit     Medication Refill     Mass           Diagnoses this Visit        Comments    Essential hypertension    -  Primary     Elevated blood pressure reading         Heart transplanted         Skin lesion of hand         Muscle spasms of both lower extremities         Gastroesophageal reflux disease without esophagitis         Fibromyalgia         Immunization deficiency         Well female exam with routine gynecological exam         CKD (chronic kidney disease), stage 4 (severe)                To Do List           Future Appointments        Provider Department Dept Phone    2017 2:15 PM PRANAY Villalobos MD [facility] - OB/ [phone]    2017 3:00 PM Latosha Alaniz MD [facility] - Dermatology [phone]    2017 10:00 AM Paxton Vasques OD [facility] - Ophthalmology [phone]      Goals (5 Years of Data)     None      Follow-Up and Disposition     Return in about 6 weeks (around 2017).    Follow-up and Disposition History       These Medications        Disp Refills Start End    baclofen (LIORESAL) 10 MG tablet 90 tablet 1 3/2/2017     Take 1 tablet (10 mg total) by mouth 3 (three) times daily as needed (muscle spasm). - Oral    Pharmacy: [facility]/pharmacy #[id number] - [facility] [address] [address] AT [facility] Ph #: [phone]       omeprazole (PRILOSEC) 10 MG capsule 90 capsule 3 3/2/2017     TAKE 1 CAPSULE (10 MG TOTAL) BY MOUTH ONCE DAILY.    Pharmacy: [facility] - [address] Ph #: [phone]       pregabalin (LYRICA) 50 MG capsule 180 capsule 1 3/2/2017     Take 1 capsule (50 mg total) by mouth 2 (two) times  daily. - Oral    Pharmacy: [facility] - [place], [place] - [address]  Ph #: [phone]       Notes to Pharmacy: This request is for a new prescription for a controlled substance as required by Federal/State law..      [facility]     [facility] On Call Nurse Care Line - 24/7 Assistance  Registered nurses in the [facility] On Call Center provide clinical advisement, health education, appointment booking, and other advisory services.  Call for this free service at [phone].             Medications           Message regarding Medications     Verify the changes and/or additions to your medication regime listed below are the same as discussed with your clinician today.  If any of these changes or additions are incorrect, please notify your healthcare provider.        STOP taking these medications     diclofenac sodium (VOLTAREN) 1 % Gel Apply 2 g topically once daily.    FLUCELVAX QUAD [id number], PF, 60 mcg (15 mcg x 4)/0.5 mL Syrg TO BE ADMINISTERED BY PHARMACIST FOR IMMUNIZATION    promethazine (PHENERGAN) 12.5 MG Tab Take 12.5 mg by mouth 4 (four) times daily as needed.           Verify that the below list of medications is an accurate representation of the medications you are currently taking.  If none reported, the list may be blank. If incorrect, please contact your healthcare provider. Carry this list with you in case of emergency.           Current Medications     ascorbic acid (VITAMIN C) 1000 MG tablet Take 1,000 mg by mouth once daily.    aspirin (ECOTRIN) 81 MG EC tablet Take 1 tablet (81 mg total) by mouth once daily.    baclofen (LIORESAL) 10 MG tablet Take 1 tablet (10 mg total) by mouth 3 (three) times daily as needed (muscle spasm).    busPIRone (BUSPAR) 10 MG tablet TAKE 1 TABLET (10 MG TOTAL) BY MOUTH 3 (THREE) TIMES DAILY.    calcium carb,cit-mag12-vit D3 500-250-200 mg-mg-unit Tab Take by mouth.    cycloSPORINE modified, NEORAL, (GENGRAF) 100 MG capsule Take 1 capsule  (100 mg total) by mouth once daily.    cycloSPORINE modified, NEORAL, 25 MG capsule TAKE 3 CAPSULES ONCE A DAY    docusate sodium (COLACE) 100 MG capsule Take 100 mg by mouth 2 (two) times daily.    duloxetine (CYMBALTA) 30 MG capsule TAKE 1 CAPSULE (30 MG TOTAL) BY MOUTH ONCE DAILY.    ferrous gluconate (FERGON) 324 MG tablet TAKE 1 TABLET (324 MG TOTAL) BY MOUTH DAILY WITH BREAKFAST.    hydrALAZINE (APRESOLINE) 50 MG tablet Take 1 and 1/2 (75 mg) po tid    hydrochlorothiazide (HYDRODIURIL) 12.5 MG Tab     metoprolol succinate (TOPROL-XL) 25 MG 24 hr tablet Take 1 tablet (25 mg total) by mouth once daily.    mirabegron (MYRBETRIQ) 25 mg Tb24 ER tablet Take 25 mg by mouth once daily.    multivitamin capsule Take 1 capsule by mouth once daily.    omeprazole (PRILOSEC) 10 MG capsule TAKE 1 CAPSULE (10 MG TOTAL) BY MOUTH ONCE DAILY.    omeprazole (PRILOSEC) 10 MG capsule TAKE 1 CAPSULE (10 MG TOTAL) BY MOUTH ONCE DAILY.    pregabalin (LYRICA) 50 MG capsule Take 1 capsule (50 mg total) by mouth 2 (two) times daily.    temazepam (RESTORIL) 30 mg capsule Take 1 capsule (30 mg total) by mouth nightly as needed for Insomnia.    vitamin E 400 UNIT capsule Take 400 Units by mouth once daily.    zolpidem (AMBIEN) 10 mg Tab Take 1 tablet (10 mg total) by mouth every evening.           Clinical Reference Information           Your Vitals Were     BP                   162/101           Blood Pressure          Most Recent Value    BP  (!)  162/101      Allergies as of 3/2/2017     Lisinopril    Atorvastatin    Hydrocodone      Immunizations Administered on Date of Encounter - 3/2/2017     Name Date Dose VIS Date Route    Pneumococcal Conjugate - 13 Valent 3/2/2017 0.5 mL 11/5/2015 Intramuscular      Orders Placed During Today's Visit      Normal Orders This Visit    Ambulatory referral to Dermatology     Ambulatory referral to Gynecology     Pneumococcal Conjugate Vaccine (13 Valent) (IM)       Maintenance Dialysis History      Patient has no recorded history of maintenance dialysis.      Transplant Information        Txp Date Organ Coordinator Care Team    5/27/1993 Heart [place] Taylormarlyn Sign-Up     Activating your [facility] account is as easy as 1-2-3!     1) Visit [email or web address], select Sign Up Now, enter this activation code and your date of birth, then select Next.  [email or web address]  Expires: 4/16/2017  3:14 PM      2) Create a username and password to use when you visit [facility] in the future and select a security question in case you lose your password and select Next.    3) Enter your e-mail address and click Sign Up!    Additional Information  If you have questions, please e-mail [email or web address] or call [phone] to talk to our [facility] staff. Remember, [facility] is NOT to be used for urgent needs. For medical emergencies, dial 911.         Language Assistance Services     ATTENTION: Language assistance services are available, free of charge. Please call [phone].      ATENCIÓN: Si habla español, tiene a arnold disposición servicios gratuitos de asistencia lingüística. Llame al [phone].     [facility] Ý: N?u b?n nói Ti?ng Vi?t, có các d?ch v? h? tr? ngôn ng? mi?n phí dành cho b?n. G?i s? [phone].         [facility] complies with applicable Federal civil rights laws and does not discriminate on the basis of race, color, national origin, age, disability, or sex.

## 2017-03-31 ENCOUNTER — PATIENT OUTREACH (OUTPATIENT)
Dept: ADMINISTRATIVE | Facility: HOSPITAL | Age: 62
End: 2017-03-31

## 2017-03-31 NOTE — LETTER
March 31, 2017    Nadia GALLEGOS Nelson  [address] [address]  [facility] [address]             [facility]  [address]  [facility] [address]  Phone: [phone] Dear Mrs. Damon:    [facility] is committed to your overall health.  To help you get the most out of each of your visits, we will review your information to make sure you are up to date on all of your recommended tests and/or procedures.      DR. KEATON ZHAO has found that you may be due for   Health Maintenance Due   Topic    TETANUS VACCINE     Zoster Vaccine     Mammogram         If you have had any of the above done at another facility, please bring the records or information with you so that your record at [facility] will be complete.    If you are currently taking medication, please bring it with you to your appointment for review.    We will be happy to assist you with scheduling any necessary appointments or you may contact the [facility] appointment desk at [phone] to schedule at your convenience.     Thank you for choosing [facility] for your healthcare needs.  If you have any questions or concerns, please don't hesitate to call.    Sincerely,  Emmy OJEDA LPN Care Coordinator  [facility]

## 2017-04-04 ENCOUNTER — TELEPHONE (OUTPATIENT)
Dept: TRANSPLANT | Facility: CLINIC | Age: 62
End: 2017-04-04

## 2017-04-04 DIAGNOSIS — R00.0 TACHYCARDIA: Primary | ICD-10-CM

## 2017-04-04 DIAGNOSIS — Z79.899 ENCOUNTER FOR LONG-TERM (CURRENT) USE OF MEDICATIONS: ICD-10-CM

## 2017-04-04 DIAGNOSIS — R53.83 FATIGUE, UNSPECIFIED TYPE: ICD-10-CM

## 2017-04-04 DIAGNOSIS — Z94.1 STATUS POST HEART TRANSPLANT: ICD-10-CM

## 2017-04-04 DIAGNOSIS — Z94.1 S/P ORTHOTOPIC HEART TRANSPLANT: ICD-10-CM

## 2017-04-04 NOTE — TELEPHONE ENCOUNTER
Spoke with pt and reviewed with pt that she will be 23 years post heart tx in May.  Orders placed. Pt stated she is doing well and sees   Dr. Ike Lopez close to home. She also sees a nephrologist.

## 2017-04-05 ENCOUNTER — OFFICE VISIT (OUTPATIENT)
Dept: OBSTETRICS AND GYNECOLOGY | Facility: CLINIC | Age: 62
End: 2017-04-05
Payer: MEDICARE

## 2017-04-05 DIAGNOSIS — Z78.0 MENOPAUSE: ICD-10-CM

## 2017-04-05 DIAGNOSIS — Z12.31 ENCOUNTER FOR SCREENING MAMMOGRAM FOR MALIGNANT NEOPLASM OF BREAST: Primary | ICD-10-CM

## 2017-04-05 DIAGNOSIS — N95.2 VAGINAL ATROPHY: ICD-10-CM

## 2017-04-05 PROBLEM — R03.0 ELEVATED BLOOD PRESSURE READING: Status: RESOLVED | Noted: 2017-03-02 | Resolved: 2017-04-05

## 2017-04-05 PROBLEM — L98.9 SKIN LESION OF HAND: Status: RESOLVED | Noted: 2017-03-02 | Resolved: 2017-04-05

## 2017-04-05 PROBLEM — Z28.39 IMMUNIZATION DEFICIENCY: Status: RESOLVED | Noted: 2017-03-02 | Resolved: 2017-04-05

## 2017-04-05 PROBLEM — Z01.419 WELL FEMALE EXAM WITH ROUTINE GYNECOLOGICAL EXAM: Status: RESOLVED | Noted: 2017-03-02 | Resolved: 2017-04-05

## 2017-04-05 PROCEDURE — G0101 CA SCREEN;PELVIC/BREAST EXAM: HCPCS | Mod: S$GLB,,, | Performed by: OBSTETRICS & GYNECOLOGY

## 2017-04-05 PROCEDURE — 99999 PR PBB SHADOW E&M-EST. PATIENT-LVL I: CPT | Mod: PBBFAC,,, | Performed by: OBSTETRICS & GYNECOLOGY

## 2017-04-05 RX ORDER — ESTRADIOL 0.1 MG/G
CREAM VAGINAL
COMMUNITY
End: 2017-04-05 | Stop reason: SDUPTHER

## 2017-04-05 RX ORDER — ESTRADIOL 0.1 MG/G
1 CREAM VAGINAL
Qty: 1 TUBE | Refills: 12 | Status: SHIPPED | OUTPATIENT
Start: 2017-04-06 | End: 2020-08-11

## 2017-04-05 NOTE — PROGRESS NOTES
Subjective:       Patient ID: Nadia Damon is a 61 y.o. female.    Chief Complaint:  Consult (Est care)      History of Present Illness  HPI  Patient presents to day for annual exam , postmenopausal.   No gyn complaints, no vaginal bleeding or pelvic pain noted.   Hormonal therapy reviewed and discussed. On vaginal estrogen only   Preventive screening exam indication and testing reviewed and discussed.  Hysterectomy with unilateral oophorectomy in the past.  History of vaginal prolapse repair x 2 after a mesh failure with initial surgery   Medical history reviewed       GYN & OB History  Patient's last menstrual period was 1983 (within years).   Date of Last Pap: No result found    OB History    Para Term  AB SAB TAB Ectopic Multiple Living   2         2      # Outcome Date GA Lbr Tirso/2nd Weight Sex Delivery Anes PTL Lv   2       Vag-Spont      1       Vag-Spont             Review of Systems  Review of Systems   Constitutional: Negative for activity change, appetite change, chills, fatigue, fever and unexpected weight change.   HENT: Negative for mouth sores.    Eyes: Negative for visual disturbance.   Respiratory: Negative for cough and shortness of breath.    Cardiovascular: Negative for chest pain and palpitations.   Gastrointestinal: Negative for abdominal pain, bloating, blood in stool, constipation, diarrhea and nausea.   Genitourinary: Negative for decreased libido, dyspareunia, dysuria, frequency, genital sores, hematuria, pelvic pain, urgency, vaginal discharge, urinary incontinence, postcoital bleeding, postmenopausal bleeding and vaginal odor.   Musculoskeletal: Negative for back pain, joint swelling and myalgias.   Skin:  Negative for rash.   Neurological: Negative for syncope, numbness and headaches.   Hematological: Negative for adenopathy. Does not bruise/bleed easily.   Psychiatric/Behavioral: Negative for depression and sleep disturbance. The patient is not  nervous/anxious.    Breast: Negative for breast mass, breast pain, nipple discharge and skin changes          Objective:    Physical Exam:   Constitutional: She appears well-developed and well-nourished. No distress.      Neck: No JVD present. No thyroid mass and no thyromegaly present.    Cardiovascular: Normal rate and regular rhythm.          Abdominal: Soft. Normal appearance and bowel sounds are normal. There is no hepatosplenomegaly. No hernia. Hernia confirmed negative in the ventral area, confirmed negative in the right inguinal area and confirmed negative in the left inguinal area.     Genitourinary: Rectum normal and vagina normal. There is no rash, tenderness, lesion or injury on the right labia. There is no rash, tenderness, lesion or injury on the left labia. Uterus is absent. Right adnexum displays no mass, no tenderness and no fullness. Left adnexum displays no mass, no tenderness and no fullness. No erythema, tenderness or bleeding in the vagina. No foreign body in the vagina. No vaginal discharge found. Vaginal cuff normal.Labial bartholins normal.Cervix exhibits absence.                        Assessment:        1. Encounter for screening mammogram for malignant neoplasm of breast     2. Menopause    3. Vaginal atrophy                Plan:      Nadia was seen today for consult.    Diagnoses and all orders for this visit:    Encounter for screening mammogram for malignant neoplasm of breast   -     Mammo Digital Screening Bilat with CAD; Future    Menopause    Vaginal atrophy  -     estradiol (ESTRACE) 0.01 % (0.1 mg/gram) vaginal cream; Place 1 g vaginally twice a week.

## 2017-04-05 NOTE — LETTER
April 5, 2017      Richie Cassidy MD  [address] [place] [address]           [facility]  [address] [place] [address]  Phone: [phone]  Fax: [phone]          Patient: Nadia Damon   MR Number: [id number]   YOB: 1955   Date of Visit: 4/5/2017       Dear Dr. Richie Cassidy:    Thank you for referring Nadia Damon to me for evaluation. Attached you will find relevant portions of my assessment and plan of care.    If you have questions, please do not hesitate to call me. I look forward to following Nadia Damon along with you.    Sincerely,    PRANAY Villalobos MD    Enclosure  CC:  No Recipients    If you would like to receive this communication electronically, please contact [email or web address] or [phone] to request more information on [facility] access.    For providers and/or their staff who would like to refer a patient to [facility], please contact us through our one-stop-shop provider referral line, [facility], at [phone].    If you feel you have received this communication in error or would no longer like to receive these types of communications, please e-mail [email or web address]

## 2017-04-05 NOTE — MR AVS SNAPSHOT
[facility]  [address] [facility] [address]  [place] [address]  Phone: [phone]  Fax: [phone]                  Nadia Damon   2017 2:15 PM   Office Visit    Description:  Female : 1955   Provider:  PRANAY Villalobos MD   Department:  [facility]/ [facility]           Reason for Visit     Consult           Diagnoses this Visit        Comments    Encounter for screening mammogram for malignant neoplasm of breast    -  Primary     Menopause         Vaginal atrophy                To Do List           Future Appointments        Provider Department Dept Phone    2017 2:00 PM Richie Cassidy MD [facility] - Primary Care [phone]    5/3/2017 9:50 AM LAB, APPOINTMENT [place] [facility] [phone]    5/3/2017 10:15 AM [facility] [id number] 485 LB LIMIT [facility] [phone]    5/3/2017 10:45 AM DOBUTAMINE, ECHO [facility] - Echo/Stress Lab [phone]    5/3/2017 1:00 PM Courtney Tubbs MD [facility] [phone]      Goals (5 Years of Data)     None      Follow-Up and Disposition     Return in about 2 years (around 2019) for Routine Gyn Exam.    Follow-up and Disposition History       These Medications        Disp Refills Start End    estradiol (ESTRACE) 0.01 % (0.1 mg/gram) vaginal cream 1 Tube 12 2017     Place 1 g vaginally twice a week. - Vaginal    Pharmacy: [facility] - [place] [id number] [facility] Ph #: [phone]         [facility]     [facility] On Call Nurse Care Line -  Assistance  Unless otherwise directed by your provider, please contact [facility], our nurse care line that is available for  assistance.     Registered nurses in the [facility] Center provide: appointment scheduling, clinical advisement, health education, and other advisory services.  Call: [phone] (toll free)               Medications           Message regarding Medications     Verify the changes and/or  additions to your medication regime listed below are the same as discussed with your clinician today.  If any of these changes or additions are incorrect, please notify your healthcare provider.        START taking these NEW medications        Refills    estradiol (ESTRACE) 0.01 % (0.1 mg/gram) vaginal cream 12    Starting on: 4/6/2017    Sig: Place 1 g vaginally twice a week.    Class: Normal    Route: Vaginal           Verify that the below list of medications is an accurate representation of the medications you are currently taking.  If none reported, the list may be blank. If incorrect, please contact your healthcare provider. Carry this list with you in case of emergency.           Current Medications     ascorbic acid (VITAMIN C) 1000 MG tablet Take 1,000 mg by mouth once daily.    aspirin (ECOTRIN) 81 MG EC tablet Take 1 tablet (81 mg total) by mouth once daily.    baclofen (LIORESAL) 10 MG tablet Take 1 tablet (10 mg total) by mouth 3 (three) times daily as needed (muscle spasm).    busPIRone (BUSPAR) 10 MG tablet TAKE 1 TABLET (10 MG TOTAL) BY MOUTH 3 (THREE) TIMES DAILY.    calcium carb,cit-mag12-vit D3 500-250-200 mg-mg-unit Tab Take by mouth.    cycloSPORINE modified, NEORAL, (GENGRAF) 100 MG capsule Take 1 capsule (100 mg total) by mouth once daily.    cycloSPORINE modified, NEORAL, 25 MG capsule TAKE 3 CAPSULES ONCE A DAY    docusate sodium (COLACE) 100 MG capsule Take 100 mg by mouth 2 (two) times daily.    duloxetine (CYMBALTA) 30 MG capsule TAKE 1 CAPSULE (30 MG TOTAL) BY MOUTH ONCE DAILY.    estradiol (ESTRACE) 0.01 % (0.1 mg/gram) vaginal cream Starting on Apr 06, 2017. Place 1 g vaginally twice a week.    ferrous gluconate (FERGON) 324 MG tablet TAKE 1 TABLET (324 MG TOTAL) BY MOUTH DAILY WITH BREAKFAST.    hydrALAZINE (APRESOLINE) 50 MG tablet Take 1 and 1/2 (75 mg) po tid    hydrochlorothiazide (HYDRODIURIL) 12.5 MG Tab     metoprolol succinate (TOPROL-XL) 25 MG 24 hr tablet Take 1 tablet (25  mg total) by mouth once daily.    mirabegron (MYRBETRIQ) 25 mg Tb24 ER tablet Take 25 mg by mouth once daily.    multivitamin capsule Take 1 capsule by mouth once daily.    omeprazole (PRILOSEC) 10 MG capsule TAKE 1 CAPSULE (10 MG TOTAL) BY MOUTH ONCE DAILY.    omeprazole (PRILOSEC) 10 MG capsule TAKE 1 CAPSULE (10 MG TOTAL) BY MOUTH ONCE DAILY.    pregabalin (LYRICA) 50 MG capsule Take 1 capsule (50 mg total) by mouth 2 (two) times daily.    temazepam (RESTORIL) 30 mg capsule Take 1 capsule (30 mg total) by mouth nightly as needed for Insomnia.    vitamin E 400 UNIT capsule Take 400 Units by mouth once daily.    zolpidem (AMBIEN) 10 mg Tab Take 1 tablet (10 mg total) by mouth every evening.           Clinical Reference Information           Your Vitals Were     Last Period                   06/20/1983 (Within Years)           Allergies as of 4/5/2017     Lisinopril    Atorvastatin    Hydrocodone      Immunizations Administered on Date of Encounter - 4/5/2017     None      Orders Placed During Today's Visit     Future Labs/Procedures Expected by Expires    Mammo Digital Screening Bilat with CAD  6/5/2017 6/5/2018      Maintenance Dialysis History     Patient has no recorded history of maintenance dialysis.      Transplant Information        Txp Date Organ Coordinator Care Team    5/27/1993 Heart [facility]lene Vasquez [facility] Sign-Up     Activating your [facility] account is as easy as 1-2-3!     1) Visit [email or web address], select Sign Up Now, enter this activation code and your date of birth, then select Next.  [email or web address]  Expires: 4/16/2017  4:14 PM      2) Create a username and password to use when you visit [facility] in the future and select a security question in case you lose your password and select Next.    3) Enter your e-mail address and click Sign Up!    Additional Information  If you have questions, please e-mail [email or web address] or call [phone] to talk to our [facility] staff. Remember,  [facility] is NOT to be used for urgent needs. For medical emergencies, dial 911.         Language Assistance Services     ATTENTION: Language assistance services are available, free of charge. Please call [phone].      ATENCIÓN: Si habla gerald, tiene a arnold disposición servicios gratuitos de asistencia lingüística. Llame al [phone].     CHÚ Ý: N?u b?n nói Ti?ng Vi?t, có các d?ch v? h? tr? ngôn ng? mi?n phí dành cho b?n. G?i s? [phone].         [facility] complies with applicable Federal civil rights laws and does not discriminate on the basis of race, color, national origin, age, disability, or sex.

## 2017-04-13 ENCOUNTER — OFFICE VISIT (OUTPATIENT)
Dept: INTERNAL MEDICINE | Facility: CLINIC | Age: 62
End: 2017-04-13
Payer: MEDICARE

## 2017-04-13 VITALS
SYSTOLIC BLOOD PRESSURE: 136 MMHG | BODY MASS INDEX: 28.74 KG/M2 | HEIGHT: 62 IN | HEART RATE: 97 BPM | TEMPERATURE: 98 F | DIASTOLIC BLOOD PRESSURE: 80 MMHG | WEIGHT: 156.19 LBS | OXYGEN SATURATION: 98 %

## 2017-04-13 DIAGNOSIS — I10 ESSENTIAL HYPERTENSION: Primary | ICD-10-CM

## 2017-04-13 DIAGNOSIS — Z94.1 HEART TRANSPLANTED: ICD-10-CM

## 2017-04-13 PROCEDURE — 99999 PR PBB SHADOW E&M-EST. PATIENT-LVL IV: CPT | Mod: PBBFAC,,, | Performed by: FAMILY MEDICINE

## 2017-04-13 PROCEDURE — 3075F SYST BP GE 130 - 139MM HG: CPT | Mod: S$GLB,,, | Performed by: FAMILY MEDICINE

## 2017-04-13 PROCEDURE — 3079F DIAST BP 80-89 MM HG: CPT | Mod: S$GLB,,, | Performed by: FAMILY MEDICINE

## 2017-04-13 PROCEDURE — 99499 UNLISTED E&M SERVICE: CPT | Mod: S$GLB,,, | Performed by: FAMILY MEDICINE

## 2017-04-13 PROCEDURE — 1160F RVW MEDS BY RX/DR IN RCRD: CPT | Mod: S$GLB,,, | Performed by: FAMILY MEDICINE

## 2017-04-13 PROCEDURE — 99213 OFFICE O/P EST LOW 20 MIN: CPT | Mod: S$GLB,,, | Performed by: FAMILY MEDICINE

## 2017-04-13 RX ORDER — HYDRALAZINE HYDROCHLORIDE 100 MG/1
100 TABLET, FILM COATED ORAL 3 TIMES DAILY
COMMUNITY
End: 2019-01-16

## 2017-04-13 NOTE — MR AVS SNAPSHOT
[facility]  [address]  [place] [address]  Phone: [phone]  Fax: [phone]                  Nadia Damon   2017 2:00 PM   Office Visit    Description:  Female : 1955   Provider:  Richie Cassidy MD   Department:  [facility] [place]           Reason for Visit     Follow-up           Diagnoses this Visit        Comments    Essential hypertension    -  Primary     Heart transplanted                To Do List           Future Appointments        Provider Department Dept Phone    5/3/2017 9:50 AM LAB, APPOINTMENT [facility] [place] [facility] [phone]    5/3/2017 10:15 AM [facility] [id number] 485 LB LIMIT [facility] [phone]    5/3/2017 10:45 AM DOBUTAMINE, ECHO [facility] [phone]    5/3/2017 1:00 PM Courtney Tubbs MD [facility] [phone]    2017 10:00 AM [facility] - Ophthalmology [phone]      Goals (5 Years of Data)     None      Follow-Up and Disposition     Return in about 6 months (around 10/13/2017).    Follow-up and Disposition History      [facility]     [facility] Nurse Care Line -  Assistance  Unless otherwise directed by your provider, please contact [facility], our nurse care line that is available for  assistance.     Registered nurses in the [facility] provide: appointment scheduling, clinical advisement, health education, and other advisory services.  Call: [phone] (toll free)               Medications           Message regarding Medications     Verify the changes and/or additions to your medication regime listed below are the same as discussed with your clinician today.  If any of these changes or additions are incorrect, please notify your healthcare provider.             Verify that the below list of medications is an accurate representation of the medications you are currently taking.  If none reported, the  list may be blank. If incorrect, please contact your healthcare provider. Carry this list with you in case of emergency.           Current Medications     ascorbic acid (VITAMIN C) 1000 MG tablet Take 1,000 mg by mouth once daily.    aspirin (ECOTRIN) 81 MG EC tablet Take 1 tablet (81 mg total) by mouth once daily.    baclofen (LIORESAL) 10 MG tablet Take 1 tablet (10 mg total) by mouth 3 (three) times daily as needed (muscle spasm).    busPIRone (BUSPAR) 10 MG tablet TAKE 1 TABLET (10 MG TOTAL) BY MOUTH 3 (THREE) TIMES DAILY.    calcium carb,cit-mag12-vit D3 500-250-200 mg-mg-unit Tab Take by mouth.    cycloSPORINE modified, NEORAL, (GENGRAF) 100 MG capsule Take 1 capsule (100 mg total) by mouth once daily.    cycloSPORINE modified, NEORAL, 25 MG capsule TAKE 3 CAPSULES ONCE A DAY    docusate sodium (COLACE) 100 MG capsule Take 100 mg by mouth 2 (two) times daily.    duloxetine (CYMBALTA) 30 MG capsule TAKE 1 CAPSULE (30 MG TOTAL) BY MOUTH ONCE DAILY.    estradiol (ESTRACE) 0.01 % (0.1 mg/gram) vaginal cream Place 1 g vaginally twice a week.    ferrous gluconate (FERGON) 324 MG tablet TAKE 1 TABLET (324 MG TOTAL) BY MOUTH DAILY WITH BREAKFAST.    hydrALAZINE (APRESOLINE) 100 MG tablet Take 100 mg by mouth 3 (three) times daily.    metoprolol succinate (TOPROL-XL) 25 MG 24 hr tablet Take 1 tablet (25 mg total) by mouth once daily.    multivitamin capsule Take 1 capsule by mouth once daily.    omeprazole (PRILOSEC) 10 MG capsule TAKE 1 CAPSULE (10 MG TOTAL) BY MOUTH ONCE DAILY.    omeprazole (PRILOSEC) 10 MG capsule TAKE 1 CAPSULE (10 MG TOTAL) BY MOUTH ONCE DAILY.    pregabalin (LYRICA) 50 MG capsule Take 1 capsule (50 mg total) by mouth 2 (two) times daily.    temazepam (RESTORIL) 30 mg capsule Take 1 capsule (30 mg total) by mouth nightly as needed for Insomnia.    vitamin E 400 UNIT capsule Take 400 Units by mouth once daily.    zolpidem (AMBIEN) 10 mg Tab Take 1 tablet (10 mg total) by mouth every evening.     "hydrochlorothiazide (HYDRODIURIL) 12.5 MG Tab     mirabegron (MYRBETRIQ) 25 mg Tb24 ER tablet Take 25 mg by mouth continuous prn.            Clinical Reference Information           Your Vitals Were     BP Pulse Temp Height Weight Last Period    136/80 97 97.7 °F (36.5 °C) (Oral) 5' 2" (1.575 m) 70.8 kg (156 lb 3.1 oz) 06/20/1983 (Within Years)    SpO2 BMI             98% 28.57 kg/m2         Blood Pressure          Most Recent Value    BP  136/80      Allergies as of 4/13/2017     Lisinopril    Atorvastatin    Hydrocodone      Immunizations Administered on Date of Encounter - 4/13/2017     None      Maintenance Dialysis History     Patient has no recorded history of maintenance dialysis.      Transplant Information        Txp Date Organ Coordinator Care Team    5/27/1993 [facility] [place] [facility]       Glydeparmindersmarlyn Sign-Up     Activating your [facility] account is as easy as 1-2-3!     1) Visit "[email or web address], select Sign Up Now, enter this activation code and your date of birth, then select Next.  [email or web address]  Expires: 4/16/2017  4:14 PM      2) Create a username and password to use when you visit [facility] in the future and select a security question in case you lose your password and select Next.    3) Enter your e-mail address and click Sign Up!    Additional Information  If you have questions, please e-mail [email or web address] or call [phone] to talk to our [facility] staff. Remember, [facility] is NOT to be used for urgent needs. For medical emergencies, dial 911.         Language Assistance Services     ATTENTION: Language assistance services are available, free of charge. Please call [phone].      ATENCIÓN: Si habla español, tiene a arnold disposición servicios gratuitos de asistencia lingüística. Llame al [phone].     CHÚ Ý: N?u b?n nói Ti?ng Vi?t, có các d?ch v? h? tr? ngôn ng? mi?n phí dành cho b?n. G?i s? [phone].         [facility] complies with applicable Federal civil " rights laws and does not discriminate on the basis of race, color, national origin, age, disability, or sex.

## 2017-04-13 NOTE — PROGRESS NOTES
Subjective:       Patient ID: Nadia Damon is a 61 y.o. female.    Chief Complaint: Follow-up    HPI Comments: Blood pressure last visit was 160/101.  Her cardiologist adjusted medication she developed pressing 100 mg 3 times a day and metoprolol 25 mg once a day.  Repeat blood pressure today 136/80.  She scheduled for annual heart transplant evaluation soon.  She is status post heart transplant for cardiomyopathy.  She denies any chest pain palpitations or shortness of breath.    Review of Systems   Constitutional: Negative for appetite change, fatigue and unexpected weight change.   Respiratory: Negative for cough, shortness of breath and wheezing.    Cardiovascular: Negative for chest pain, palpitations and leg swelling.   Gastrointestinal: Negative for abdominal pain.   Genitourinary: Negative for difficulty urinating.   Neurological: Negative for headaches.       Objective:      Physical Exam   Constitutional: She appears well-developed and well-nourished. No distress.   Neck: Neck supple. No thyromegaly present.   Cardiovascular: Normal rate, regular rhythm and normal heart sounds.    No murmur heard.  Pulmonary/Chest: Effort normal and breath sounds normal. No respiratory distress. She has no wheezes. She has no rales.   Abdominal: Soft. Bowel sounds are normal. She exhibits no mass. There is no tenderness.   Lymphadenopathy:     She has no cervical adenopathy.   Neurological: She is alert.       Assessment:       No diagnosis found.    Plan:        Blood pressureis controlled.  Continue current medications and follow up in 6 months

## 2017-04-19 DIAGNOSIS — F41.9 ANXIETY: Chronic | ICD-10-CM

## 2017-04-19 DIAGNOSIS — M79.7 FIBROMYALGIA: ICD-10-CM

## 2017-04-19 RX ORDER — BUSPIRONE HYDROCHLORIDE 10 MG/1
TABLET ORAL
Qty: 90 TABLET | Refills: 0 | Status: SHIPPED | OUTPATIENT
Start: 2017-04-19 | End: 2017-05-12 | Stop reason: SDUPTHER

## 2017-04-19 RX ORDER — PREGABALIN 50 MG/1
50 CAPSULE ORAL 2 TIMES DAILY
Qty: 180 CAPSULE | Refills: 1 | Status: SHIPPED | OUTPATIENT
Start: 2017-04-19 | End: 2017-08-21 | Stop reason: SDUPTHER

## 2017-04-19 RX ORDER — DULOXETIN HYDROCHLORIDE 30 MG/1
CAPSULE, DELAYED RELEASE ORAL
Qty: 90 CAPSULE | Refills: 1 | Status: SHIPPED | OUTPATIENT
Start: 2017-04-19 | End: 2017-07-25 | Stop reason: SDUPTHER

## 2017-05-11 RX ORDER — FERROUS GLUCONATE 324(38)MG
TABLET ORAL
Qty: 30 TABLET | Refills: 11 | Status: SHIPPED | OUTPATIENT
Start: 2017-05-11 | End: 2018-06-14

## 2017-05-12 ENCOUNTER — OFFICE VISIT (OUTPATIENT)
Dept: OPHTHALMOLOGY | Facility: CLINIC | Age: 62
End: 2017-05-12
Payer: MEDICARE

## 2017-05-12 DIAGNOSIS — F41.9 ANXIETY: Chronic | ICD-10-CM

## 2017-05-12 DIAGNOSIS — H52.7 REFRACTIVE ERROR: ICD-10-CM

## 2017-05-12 PROCEDURE — 92015 DETERMINE REFRACTIVE STATE: CPT | Mod: S$GLB,,, | Performed by: OPTOMETRIST

## 2017-05-12 PROCEDURE — 92014 COMPRE OPH EXAM EST PT 1/>: CPT | Mod: S$GLB,,, | Performed by: OPTOMETRIST

## 2017-05-12 PROCEDURE — 99999 PR PBB SHADOW E&M-EST. PATIENT-LVL I: CPT | Mod: PBBFAC,,, | Performed by: OPTOMETRIST

## 2017-05-12 NOTE — PROGRESS NOTES
HPI     No visual complaints with glasses. Last ey exam 05/10/2016 TRF. Update   glasses RX.       Last edited by Annette Forman on 5/12/2017  1:59 PM.         Assessment /Plan     For exam results, see Encounter Report.    Nuclear cataract, bilateral    Refractive error      Mild cataracts OU, not surgical.    Asymmetric CD, low IOP, no family hx POAG    Dispense Final Rx for glasses.  RTC 1 year

## 2017-05-14 RX ORDER — BUSPIRONE HYDROCHLORIDE 10 MG/1
TABLET ORAL
Qty: 90 TABLET | Refills: 0 | Status: SHIPPED | OUTPATIENT
Start: 2017-05-14 | End: 2017-06-08 | Stop reason: SDUPTHER

## 2017-06-08 DIAGNOSIS — F41.9 ANXIETY: Chronic | ICD-10-CM

## 2017-06-08 RX ORDER — BUSPIRONE HYDROCHLORIDE 10 MG/1
TABLET ORAL
Qty: 90 TABLET | Refills: 0 | Status: SHIPPED | OUTPATIENT
Start: 2017-06-08 | End: 2017-07-03 | Stop reason: SDUPTHER

## 2017-06-20 ENCOUNTER — DOCUMENTATION ONLY (OUTPATIENT)
Dept: CARDIOLOGY | Facility: CLINIC | Age: 62
End: 2017-06-20

## 2017-06-20 ENCOUNTER — HOSPITAL ENCOUNTER (OUTPATIENT)
Dept: CARDIOLOGY | Facility: CLINIC | Age: 62
Discharge: HOME | End: 2017-06-20
Payer: MEDICARE

## 2017-06-20 ENCOUNTER — OFFICE VISIT (OUTPATIENT)
Dept: TRANSPLANT | Facility: CLINIC | Age: 62
End: 2017-06-20
Payer: MEDICARE

## 2017-06-20 ENCOUNTER — HOSPITAL ENCOUNTER (OUTPATIENT)
Dept: RADIOLOGY | Facility: HOSPITAL | Age: 62
Discharge: HOME | End: 2017-06-20
Attending: INTERNAL MEDICINE
Payer: MEDICARE

## 2017-06-20 VITALS
BODY MASS INDEX: 27.63 KG/M2 | WEIGHT: 150.13 LBS | SYSTOLIC BLOOD PRESSURE: 132 MMHG | HEIGHT: 62 IN | HEART RATE: 94 BPM | DIASTOLIC BLOOD PRESSURE: 77 MMHG

## 2017-06-20 DIAGNOSIS — Z79.52 LONG TERM CURRENT USE OF SYSTEMIC STEROIDS: ICD-10-CM

## 2017-06-20 DIAGNOSIS — T86.20 COMPLICATION OF HEART TRANSPLANT, UNSPECIFIED COMPLICATION: ICD-10-CM

## 2017-06-20 DIAGNOSIS — N18.30 CHRONIC KIDNEY DISEASE, STAGE 3: Chronic | ICD-10-CM

## 2017-06-20 DIAGNOSIS — D84.9 IMMUNOCOMPROMISED PATIENT: ICD-10-CM

## 2017-06-20 DIAGNOSIS — I10 ESSENTIAL HYPERTENSION: ICD-10-CM

## 2017-06-20 DIAGNOSIS — Z94.1 HEART TRANSPLANTED: Primary | Chronic | ICD-10-CM

## 2017-06-20 DIAGNOSIS — Z94.1 S/P ORTHOTOPIC HEART TRANSPLANT: ICD-10-CM

## 2017-06-20 DIAGNOSIS — K21.9 GASTROESOPHAGEAL REFLUX DISEASE WITHOUT ESOPHAGITIS: Chronic | ICD-10-CM

## 2017-06-20 DIAGNOSIS — Z94.1 STATUS POST HEART TRANSPLANT: ICD-10-CM

## 2017-06-20 DIAGNOSIS — R00.0 TACHYCARDIA: ICD-10-CM

## 2017-06-20 LAB
DIASTOLIC DYSFUNCTION: NO
ESTIMATED PA SYSTOLIC PRESSURE: 36.41
RETIRED EF AND QEF - SEE NOTES: 60 (ref 55–65)
TRICUSPID VALVE REGURGITATION: NORMAL

## 2017-06-20 PROCEDURE — 71020 XR CHEST PA AND LATERAL: CPT | Mod: 26,,, | Performed by: RADIOLOGY

## 2017-06-20 PROCEDURE — 93320 DOPPLER ECHO COMPLETE: CPT | Mod: S$GLB,,, | Performed by: INTERNAL MEDICINE

## 2017-06-20 PROCEDURE — 99499 UNLISTED E&M SERVICE: CPT | Mod: S$GLB,,, | Performed by: INTERNAL MEDICINE

## 2017-06-20 PROCEDURE — 93325 DOPPLER ECHO COLOR FLOW MAPG: CPT | Mod: S$GLB,,, | Performed by: INTERNAL MEDICINE

## 2017-06-20 PROCEDURE — 99214 OFFICE O/P EST MOD 30 MIN: CPT | Mod: S$GLB,,, | Performed by: INTERNAL MEDICINE

## 2017-06-20 PROCEDURE — 93351 STRESS TTE COMPLETE: CPT | Mod: S$GLB,,, | Performed by: INTERNAL MEDICINE

## 2017-06-20 PROCEDURE — 99999 PR PBB SHADOW E&M-EST. PATIENT-LVL IV: CPT | Mod: PBBFAC,,, | Performed by: INTERNAL MEDICINE

## 2017-06-20 RX ORDER — AMLODIPINE BESYLATE 5 MG/1
5 TABLET ORAL DAILY
COMMUNITY
Start: 2017-06-05 | End: 2019-09-09 | Stop reason: SDUPTHER

## 2017-06-20 NOTE — PROGRESS NOTES
Patient verified by 2 identifiers and allergies reviewed.  24g IV placed to Rt wrist.  DSE explained to patient, consent obtained & testing completed.  Pt tolerated testing well. IV removed post testing.  Post study discharge instructions reviewed with patient, patient verbalized understanding.  Patient discharged to home in stable condition.

## 2017-06-26 NOTE — PROGRESS NOTES
Subjective:   Ms. Damon is a 61 y.o. year old Black or  female who received a  heart transplant on 5/27/93.      HPI 62 y/o AAF s/p OHTx 5/27/;93 [facility], s/p PPM same date, mild anemia and leukopenia, OA, cervical spine DJD with radiculopathy and chronic neck pain who presents for her 24th post annual transplant evaluation. She has been doing great, denies any cardiopulmonary complaints, denies N/v/F/C, lightheadedness, dizziness, PND, orthopnea, LE edema, abdominal pain, abdominal pressure. Feels great otherwise. She states she had a colonoscopy about 3-4 years ago. Due for mammogram, which she is going to schedule soon. Sees us every other year.  Will be due for angiogram next year at 25 years post-OHT.     DSE 06/20/17:     1-Post-cardiac transplantation study. -    2- Normal left ventricular systolic function (EF 60-65%).     3 - Normal right ventricular systolic function .     4 - The estimated PA systolic pressure is 36 mmHg.     5 - Trivial tricuspid regurgitation.   No evidence of stress induced myocardial ischemia.    Review of Systems   Constitution: Negative for chills, decreased appetite, diaphoresis, fever, weakness, malaise/fatigue, weight gain and weight loss.   HENT: Negative for headaches.    Eyes: Negative for visual disturbance.   Cardiovascular: Negative for chest pain, claudication, cyanosis, dyspnea on exertion, irregular heartbeat, leg swelling, near-syncope, orthopnea and palpitations.   Respiratory: Negative for cough, hemoptysis, shortness of breath, sleep disturbances due to breathing, snoring, sputum production and wheezing.    Hematologic/Lymphatic: Negative for adenopathy and bleeding problem. Does not bruise/bleed easily.   Skin: Negative for color change, poor wound healing, rash, skin cancer and suspicious lesions.   Musculoskeletal: Negative for back pain, falls, gout, joint pain, muscle weakness and myalgias.   Gastrointestinal: Negative for bloating,  "abdominal pain, anorexia, constipation, diarrhea, heartburn, hematemesis, hematochezia, hemorrhoids, jaundice, melena, nausea and vomiting.   Genitourinary: Negative for nocturia.   Neurological: Negative for excessive daytime sleepiness, dizziness, focal weakness, light-headedness, paresthesias and tremors.   Psychiatric/Behavioral: Negative for depression and memory loss. The patient does not have insomnia and is not nervous/anxious.          Physical Exam   Constitutional: She appears well-developed and well-nourished. No distress.   /77   Pulse 94   Ht 5' 2" (1.575 m)   Wt 68.1 kg (150 lb 2.1 oz)   LMP 06/20/1983 (Within Years)   BMI 27.46 kg/m²      HENT:   Head: Normocephalic and atraumatic. Head is without abrasion and without contusion.   Right Ear: External ear normal.   Left Ear: External ear normal.   Nose: Nose normal. No epistaxis.   Mouth/Throat: Oropharynx is clear and moist. Mucous membranes are not cyanotic.   Eyes: Conjunctivae and EOM are normal. Pupils are equal, round, and reactive to light.   Neck: Normal range of motion. Neck supple. No tracheal deviation present.   Cardiovascular: Normal rate, regular rhythm, normal heart sounds and normal pulses.  Exam reveals no gallop.    No murmur heard.  Pulmonary/Chest: Effort normal and breath sounds normal. No stridor. No respiratory distress. She has no wheezes.   Abdominal: Soft. Normal appearance, normal aorta and bowel sounds are normal. She exhibits no distension. There is no tenderness.   Musculoskeletal: She exhibits no edema or tenderness.   Neurological: She is alert. She has normal strength and normal reflexes. She exhibits normal muscle tone.   Skin: Skin is warm. No rash noted. No erythema.   Psychiatric: She has a normal mood and affect. Her speech is normal and behavior is normal. Judgment and thought content normal. Cognition and memory are normal.   Nursing note and vitals reviewed.      Lab Results   Component Value Date    " WBC 3.37 (L) 06/20/2017    HGB 9.5 (L) 06/20/2017    HCT 29.4 (L) 06/20/2017    MCV 87 06/20/2017     06/20/2017    CO2 29 06/20/2017    CREATININE 1.9 (H) 06/20/2017    CALCIUM 9.5 06/20/2017    ALBUMIN 3.4 (L) 06/20/2017    AST 37 06/20/2017     (H) 06/20/2017    ALT 28 06/20/2017    .0 (H) 11/22/2016       BNP   Date Value Ref Range Status   06/20/2017 216 (H) 0 - 99 pg/mL Final     Comment:     Values of less than 100 pg/ml are consistent with non-CHF populations.   01/23/2017 132 (H) 0 - 99 pg/mL Final     Comment:     Values of less than 100 pg/ml are consistent with non-CHF populations.   08/08/2016 146 (H) 0 - 99 pg/mL Final     Comment:     Values of less than 100 pg/ml are consistent with non-CHF populations.       No results found for: SIROLIMUS  Cyclosporine, LC/MS   Date Value Ref Range Status   06/20/2017 108 100 - 400 ng/mL Final     Comment:     Reference Normals:  For Kidney Transplants: 100-300 ng/mL  Testing performed by Liquid Chromatography-Tandem  Mass Spectrometry (LC-MS/MS).  This test was developed and its performance characteristics  determined by [facility], Department of Pathology  and Laboratory Medicine in a manner consistent with CLIA  requirements. It has not been cleared or approved by the [place]  Food and Drug Administration.  This test is used for clinical  purposes.  It should not be regarded as investigational or for  research.           Assessment:     1. Heart transplanted    2. Essential hypertension    3. Chronic kidney disease, stage 3    4. Gastroesophageal reflux disease without esophagitis    5. Immunocompromised patient        Plan:     OHT- stable, DSE negative for ischemia, LDL at goal with no known CAV  HTN-stable  CKD- in range of baseline  GERD- on medication, stable  Immunosuppression-follow up on levels, adjust as indicated  Health maintenance- derm, eye, teeth, and mammogram, patient will be arranging  Return instructions as set forth  by post transplant schedule or as needed:    Clinic: Return for labs and/or biopsy weekly the first month, every two weeks during month 2 and then monthly for the first year at the provider or coordinator's discretion. During the second year, return to clinic every 3 months. Post transplant year 3-5 return every 6 months. There will be a comprehensive post transplant evaluation every year that may include LHC/RHC/biopsy, stress test, echo, CXR, and other health screening exams.    In addition to the clinical assessment, I have ordered Allomap testing for this patient to assist in identification of moderate/severe acute cellular rejection (ACR) in a pt with stable Allograft function instead of endomyocardial biopsy.     Patient is reminded to call with any health changes as these can be early signs of transplant complications. Patient is advised to make sure any new medications or changes of old medications are discussed with a pharmacist or physician knowledgeable with transplant to avoid rejection/drug toxicity related to significant drug interactions.      Courtney Tubbs MD

## 2017-07-03 DIAGNOSIS — F41.9 ANXIETY: Chronic | ICD-10-CM

## 2017-07-03 DIAGNOSIS — M62.838 MUSCLE SPASMS OF BOTH LOWER EXTREMITIES: ICD-10-CM

## 2017-07-03 RX ORDER — BUSPIRONE HYDROCHLORIDE 10 MG/1
10 TABLET ORAL DAILY PRN
Qty: 90 TABLET | Refills: 0 | Status: SHIPPED | OUTPATIENT
Start: 2017-07-03 | End: 2017-08-21 | Stop reason: SDUPTHER

## 2017-07-03 RX ORDER — BACLOFEN 10 MG/1
TABLET ORAL
Qty: 270 TABLET | Refills: 1 | Status: SHIPPED | OUTPATIENT
Start: 2017-07-03 | End: 2017-07-03 | Stop reason: SDUPTHER

## 2017-07-05 RX ORDER — BACLOFEN 10 MG/1
TABLET ORAL
Qty: 270 TABLET | Refills: 1 | Status: SHIPPED | OUTPATIENT
Start: 2017-07-05 | End: 2018-01-13 | Stop reason: SDUPTHER

## 2017-07-08 DIAGNOSIS — F51.01 PRIMARY INSOMNIA: ICD-10-CM

## 2017-07-10 RX ORDER — ZOLPIDEM TARTRATE 10 MG/1
TABLET ORAL
Qty: 30 TABLET | Refills: 5 | Status: SHIPPED | OUTPATIENT
Start: 2017-07-10 | End: 2017-07-12 | Stop reason: SDUPTHER

## 2017-07-12 DIAGNOSIS — F51.01 PRIMARY INSOMNIA: ICD-10-CM

## 2017-07-12 RX ORDER — TEMAZEPAM 30 MG/1
30 CAPSULE ORAL NIGHTLY PRN
Qty: 30 CAPSULE | Refills: 0 | Status: SHIPPED | OUTPATIENT
Start: 2017-07-12 | End: 2017-08-21 | Stop reason: SDUPTHER

## 2017-07-12 RX ORDER — ZOLPIDEM TARTRATE 10 MG/1
10 TABLET ORAL NIGHTLY
Qty: 30 TABLET | Refills: 0 | Status: SHIPPED | OUTPATIENT
Start: 2017-07-12 | End: 2018-01-19 | Stop reason: SDUPTHER

## 2017-07-12 NOTE — TELEPHONE ENCOUNTER
----- Message from Iraida Forman sent at 7/11/2017  2:53 PM CDT -----  Contact: Pt   Pt is requesting to speak with the nurse in regards to getting an override on her medication./ Pt can be reached at [phone] (home)

## 2017-07-12 NOTE — TELEPHONE ENCOUNTER
Pt states that her medication is not going to be here in time coming from [facility], they have asked if she could get a weeks worth until her mail prescription comes through. Please advise.   poor historian

## 2017-07-24 ENCOUNTER — INITIAL CONSULT (OUTPATIENT)
Dept: DERMATOLOGY | Facility: CLINIC | Age: 62
End: 2017-07-24
Payer: MEDICARE

## 2017-07-24 DIAGNOSIS — L85.8 KERATOSIS PILARIS: ICD-10-CM

## 2017-07-24 DIAGNOSIS — B07.9 VIRAL WARTS, UNSPECIFIED TYPE: ICD-10-CM

## 2017-07-24 DIAGNOSIS — L82.1 SEBORRHEIC KERATOSIS: ICD-10-CM

## 2017-07-24 DIAGNOSIS — L81.8 IDIOPATHIC GUTTATE HYPOMELANOSIS: Primary | ICD-10-CM

## 2017-07-24 PROCEDURE — 99203 OFFICE O/P NEW LOW 30 MIN: CPT | Mod: 25,S$GLB,, | Performed by: DERMATOLOGY

## 2017-07-24 PROCEDURE — 99499 UNLISTED E&M SERVICE: CPT | Mod: S$GLB,,, | Performed by: DERMATOLOGY

## 2017-07-24 PROCEDURE — 17110 DESTRUCTION B9 LES UP TO 14: CPT | Mod: S$GLB,,, | Performed by: DERMATOLOGY

## 2017-07-24 PROCEDURE — 99999 PR PBB SHADOW E&M-EST. PATIENT-LVL II: CPT | Mod: PBBFAC,,, | Performed by: DERMATOLOGY

## 2017-07-24 RX ORDER — AMMONIUM LACTATE 12 G/100G
LOTION TOPICAL
Qty: 225 G | Refills: 11 | Status: SHIPPED | OUTPATIENT
Start: 2017-07-24 | End: 2019-05-08 | Stop reason: SDUPTHER

## 2017-07-24 NOTE — PROGRESS NOTES
Subjective:       Patient ID:  Nadia Damon is a 62 y.o. female who presents for   Chief Complaint   Patient presents with    Lesion     c/o bump on right thumb x 7-8 months.      Hx of heart transplant (1993, currently on cyclosporine)    History of Present Illness: The patient presents with chief complaint of lesion.  Location: right thumb  Duration: 8 months  Signs/Symptoms: painful    Prior treatments: none          Review of Systems   Constitutional: Negative for fever and chills.   Gastrointestinal: Negative for nausea and vomiting.   Skin: Negative for daily sunscreen use, activity-related sunscreen use and recent sunburn.   Hematologic/Lymphatic: Does not bruise/bleed easily.        Objective:    Physical Exam   Constitutional: She appears well-developed and well-nourished. No distress.   Neurological: She is alert and oriented to person, place, and time. She is not disoriented.   Psychiatric: She has a normal mood and affect.   Skin:   Areas Examined (abnormalities noted in diagram):   Head / Face Inspection Performed  Neck Inspection Performed  Chest / Axilla Inspection Performed  Abdomen Inspection Performed  Back Inspection Performed  RUE Inspected  LUE Inspection Performed  RLE Inspected  LLE Inspection Performed  Nails and Digits Inspection Performed                  Diagram Legend     Erythematous scaling macule/papule c/w actinic keratosis       Vascular papule c/w angioma      Pigmented verrucoid papule/plaque c/w seborrheic keratosis      Yellow umbilicated papule c/w sebaceous hyperplasia      Irregularly shaped tan macule c/w lentigo     1-2 mm smooth white papules consistent with Milia      Movable subcutaneous cyst with punctum c/w epidermal inclusion cyst      Subcutaneous movable cyst c/w pilar cyst      Firm pink to brown papule c/w dermatofibroma      Pedunculated fleshy papule(s) c/w skin tag(s)      Evenly pigmented macule c/w junctional nevus     Mildly variegated pigmented,  slightly irregular-bordered macule c/w mildly atypical nevus      Flesh colored to evenly pigmented papule c/w intradermal nevus       Pink pearly papule/plaque c/w basal cell carcinoma      Erythematous hyperkeratotic cursted plaque c/w SCC      Surgical scar with no sign of skin cancer recurrence      Open and closed comedones      Inflammatory papules and pustules      Verrucoid papule consistent consistent with wart     Erythematous eczematous patches and plaques     Dystrophic onycholytic nail with subungual debris c/w onychomycosis     Umbilicated papule    Erythematous-base heme-crusted tan verrucoid plaque consistent with inflamed seborrheic keratosis     Erythematous Silvery Scaling Plaque c/w Psoriasis     See annotation      Assessment / Plan:        Idiopathic guttate hypomelanosis  Reassurance given.  Lesions are benign.    Seborrheic keratosis  Reassurance given.  Lesions are benign.    Keratosis pilaris  -     ammonium lactate (AMLACTIN) 12 % lotion; Use daily.  Apply to damp skin after bathing.  Dispense: 225 g; Refill: 11  -     Discussed dry skin care, AVS given.  Will start ammonium lactate 12% lotion lotion daily to damp skin.     Viral warts, unspecified type  Discussed diagnosis, AVS given.  Will treat with cryotherapy.  Once wart(s) heals from cryotherapy (freezing therapy), start over-the-counter 17% salicyclic acid cream or liquid at bedtime and cover with bandage.    Cryosurgery procedure note:    After risks, benefits, and alternatives discussed, including blistering, pain, scar, recurrence, allergy to anesthesia (if given), hyper- and hypopigmentation, verbal consent from the patient is obtained.  Liquid nitrogen cryosurgery is applied to 1 verruca with prior paring, as detailed in the physical exam, to produce a freeze injury. 2 consecutive freeze thaw cycles are applied to each verruca. The patient is aware that blisters (possibly blood blisters) may form.             Return in about 4  weeks (around 8/21/2017).

## 2017-07-24 NOTE — LETTER
July 24, 2017      Richie Cassidy MD  [address] [place] [address] [address] [place] [address]           [facility]  [address] [place] [address]  Phone: [phone]  Fax: [phone]          Patient: Nadia Damon   MR Number: [id number]   YOB: 1955   Date of Visit: 7/24/2017       Dear Dr. Richie Cassidy:    Thank you for referring Nadia Damon to me for evaluation. Attached you will find relevant portions of my assessment and plan of care.    If you have questions, please do not hesitate to call me. I look forward to following Nadia Damon along with you.    Sincerely,    Latosha Alaniz MD    Enclosure  CC:  No Recipients    If you would like to receive this communication electronically, please contact [email or web address] or [phone] to request more information on Hang[employer] Link access.    For providers and/or their staff who would like to refer a patient to [facility], please contact us through our one-stop-shop provider referral line, [facility], at [phone].    If you feel you have received this communication in error or would no longer like to receive these types of communications, please e-mail [email or web address]

## 2017-07-24 NOTE — PATIENT INSTRUCTIONS
Wart Treatment Instructions  Once wart(s) heals from cryotherapy (freezing therapy), start over-the-counter 17% salicyclic acid cream or liquid at bedtime and cover with bandage.  Use a nail file or emery board to file down the wart several times/week to keep the wart soft.  Use this medication nightly until the wart resolves or until your next appointment.      Cryosurgery    Your doctor has used a method called cryosurgery to treat your skin condition. Cryosurgery refers to the use of very cold substances to treat a variety of skin conditions such as warts, pre-skin cancers, molluscum contagiosum, sun spots, and several benign growths. The substance we use in cryosurgery is liquid nitrogen and is so cold (-195 degrees Celsius) that is burns when administered.     Following treatment in the office, the skin may immediately burn and become red. You may find the area around the lesion is affected as well. It is sometimes necessary to treat not only the lesion, but a small area of the surrounding normal skin to achieve a good response.     A blister, and even a blood filled blister, may form after treatment.   This is a normal response. If the blister is painful, it is acceptable to sterilize a needle and with rubbing alcohol and gently pop the blister. It is important that you gently wash the area with soap and warm water as the blister fluid may contain wart virus if a wart was treated. Do no remove the roof of the blister.     The area treated can take anywhere from 1-3 weeks to heal. Healing time depends on the kind of skin lesion treated, the location, and how aggressively the lesion was treated. It is recommended that the areas treated are covered with Vaseline or bacitracin ointment and a band-aid. If a band-aid is not practical, just ointment applied several times per day will do. Keeping these areas moist will speed the healing time.    Treatment with liquid nitrogen can leave a scar. In dark skin, it may be  a light or dark scar, in light skin it may be a white or pink scar. These will generally fade with time.    If you have any concerns after your treatment, please feel free to call the office.     What Are Warts?  Warts are common skin growths that can appear anywhere on the body. There are many types of warts. In most cases, they are benign (not cancer) and harmless. But warts can be embarrassing. And some warts are painful. The good news is that they can be treated.  Who Gets Warts?  Warts are most common in children and teens. But they can occur at any age. They are also more common in certain occupations, such as those that involve handling meat, poultry, or fish. A weakened immune system may make a person more prone to warts.  What Causes Warts?  Warts are caused by the human papillomavirus (HPV). There are over 150 types of HPV. This virus can spread between people. But you can be exposed to the virus and not get warts. Warts tend to form where skin is damaged or broken. But they can also appear elsewhere. Left untreated, warts can grow in number. They can also spread to other parts of the body.    Types of Warts  There are many types of warts. Some of the most common ones are described below:  · Common warts have a raised, rough surface. Enlarged blood vessels in the warts look like dots on the warts surface. Common warts form mainly on the hands, but can appear on other parts of the body.  · Plantar warts are warts appearing on the soles of the feet. When you stand or walk, pressure makes plantar warts painful. When they form in clusters, plantar warts are called mosaic warts.  · Periungual warts form under and around fingernails. People who bite their nails are more at risk.  · Filiform warts are slender, fingerlike growths that can dangle from the skin. They most often appear on the face and neck.  · Flat warts are small, smooth growths. They tend to form in clusters on the face, backs of the hands, or  legs.  · Genital warts (condyloma) can appear on or around the genitals. Because these warts can spread and are linked to cervical, anal, and other cancers, it is important to have them treated promptly.  © 6826-4963 The "[employer] [address] [place] [address], [place] [address]. All rights reserved. This information is not intended as a substitute for professional medical care. Always follow your healthcare professional's instructions.        [facility]  [address] [place] [address]  Dept: [phone]  Dept Fax: [phone]

## 2017-07-25 DIAGNOSIS — M79.7 FIBROMYALGIA: ICD-10-CM

## 2017-07-25 RX ORDER — DULOXETIN HYDROCHLORIDE 30 MG/1
CAPSULE, DELAYED RELEASE ORAL
Qty: 90 CAPSULE | Refills: 1 | Status: SHIPPED | OUTPATIENT
Start: 2017-07-25 | End: 2018-02-07 | Stop reason: SDUPTHER

## 2017-07-27 NOTE — TELEPHONE ENCOUNTER
Received a refill request for the pt's medication. Called the pharmacy too see if the pt's medication was in. The pharmacy tech states that they do have her Rx there, but it is on hold until the 2nd of August. Notified the pt. Pt states that she does verbalize understanding of the information given.

## 2017-08-15 ENCOUNTER — OFFICE VISIT (OUTPATIENT)
Dept: INTERNAL MEDICINE | Facility: CLINIC | Age: 62
End: 2017-08-15
Payer: MEDICARE

## 2017-08-15 VITALS
SYSTOLIC BLOOD PRESSURE: 128 MMHG | HEIGHT: 62 IN | BODY MASS INDEX: 26.74 KG/M2 | TEMPERATURE: 98 F | OXYGEN SATURATION: 98 % | WEIGHT: 145.31 LBS | DIASTOLIC BLOOD PRESSURE: 78 MMHG | HEART RATE: 96 BPM

## 2017-08-15 DIAGNOSIS — I10 ESSENTIAL HYPERTENSION: ICD-10-CM

## 2017-08-15 DIAGNOSIS — F33.9 CHRONIC MAJOR DEPRESSIVE DISORDER, RECURRENT EPISODE: ICD-10-CM

## 2017-08-15 DIAGNOSIS — Z23 NEED FOR INFLUENZA VACCINATION: ICD-10-CM

## 2017-08-15 DIAGNOSIS — M79.7 FIBROMYALGIA: ICD-10-CM

## 2017-08-15 DIAGNOSIS — D84.9 IMMUNOCOMPROMISED PATIENT: ICD-10-CM

## 2017-08-15 DIAGNOSIS — D50.9 IRON DEFICIENCY ANEMIA, UNSPECIFIED IRON DEFICIENCY ANEMIA TYPE: ICD-10-CM

## 2017-08-15 DIAGNOSIS — G47.00 INSOMNIA, UNSPECIFIED TYPE: ICD-10-CM

## 2017-08-15 DIAGNOSIS — N95.2 VAGINAL ATROPHY: ICD-10-CM

## 2017-08-15 DIAGNOSIS — Z00.00 ENCOUNTER FOR PREVENTATIVE ADULT HEALTH CARE EXAMINATION: Primary | ICD-10-CM

## 2017-08-15 DIAGNOSIS — Z12.31 ENCOUNTER FOR SCREENING MAMMOGRAM FOR BREAST CANCER: ICD-10-CM

## 2017-08-15 DIAGNOSIS — N18.4 CKD (CHRONIC KIDNEY DISEASE) STAGE 4, GFR 15-29 ML/MIN: ICD-10-CM

## 2017-08-15 DIAGNOSIS — I70.0 AORTIC ATHEROSCLEROSIS: ICD-10-CM

## 2017-08-15 DIAGNOSIS — F41.9 ANXIETY: ICD-10-CM

## 2017-08-15 DIAGNOSIS — K21.9 GASTROESOPHAGEAL REFLUX DISEASE WITHOUT ESOPHAGITIS: Chronic | ICD-10-CM

## 2017-08-15 DIAGNOSIS — Z94.1 HEART TRANSPLANTED: Chronic | ICD-10-CM

## 2017-08-15 PROCEDURE — 99999 PR PBB SHADOW E&M-EST. PATIENT-LVL V: CPT | Mod: PBBFAC,,, | Performed by: NURSE PRACTITIONER

## 2017-08-15 PROCEDURE — 99499 UNLISTED E&M SERVICE: CPT | Mod: S$GLB,,, | Performed by: NURSE PRACTITIONER

## 2017-08-15 PROCEDURE — G0439 PPPS, SUBSEQ VISIT: HCPCS | Mod: S$GLB,,, | Performed by: NURSE PRACTITIONER

## 2017-08-15 NOTE — PROGRESS NOTES
"Nadia Damon presented for a  Medicare AWV and comprehensive Health Risk Assessment today. The following components were reviewed and updated:    · Medical history  · Family History  · Social history  · Allergies and Current Medications  · Health Risk Assessment  · Health Maintenance  · Care Team     ** See Completed Assessments for Annual Wellness Visit within the encounter summary.**       The following assessments were completed:  · Living Situation  · CAGE  · Depression Screening  · Timed Get Up and Go  · Whisper Test  · Cognitive Function Screening  · Nutrition Screening  · ADL Screening  · PAQ Screening    Vitals:    08/15/17 1315 08/15/17 1345   BP: 128/78    Pulse: 106 96   Temp: 97.5 °F (36.4 °C)    SpO2: 98%    Weight: 65.9 kg (145 lb 4.5 oz)    Height: 5' 2" (1.575 m)      Body mass index is 26.57 kg/m².  Physical Exam   Constitutional: She is oriented to person, place, and time. Vital signs are normal. She appears well-developed and well-nourished. No distress.   HENT:   Head: Normocephalic and atraumatic.   Eyes: Conjunctivae, EOM and lids are normal. Pupils are equal, round, and reactive to light.   Neck: Trachea normal. Neck supple. No tracheal deviation present. No thyromegaly present.   Cardiovascular: Normal rate, regular rhythm, S1 normal, S2 normal, normal heart sounds and intact distal pulses.    Pulmonary/Chest: Effort normal and breath sounds normal. No respiratory distress. She has no wheezes. She exhibits no tenderness.   Abdominal: Soft. Normal appearance and bowel sounds are normal. She exhibits no distension and no mass. There is no tenderness.   Musculoskeletal: Normal range of motion. She exhibits edema (3+ BLE).   Lymphadenopathy:     She has no cervical adenopathy.   Neurological: She is alert and oriented to person, place, and time. She has normal reflexes.   Skin: Skin is warm, dry and intact. No rash noted. She is not diaphoretic.   Psychiatric: She has a normal mood and affect. " Her behavior is normal.   Vitals reviewed.        Diagnoses and health risks identified today and associated recommendations/orders:    1. Encounter for preventative adult health care examination  Completed today     2. Essential hypertension  Stable and controlled norvasc, hydralazine, HCTZ, metoprolol. Continue to follow with Dr King and PCP.     3. Heart transplanted  Almost 25 years ago for cardiomyopathy. Up to date with transplant team, Dr Tubbs. Sees them biannually. On cyclosporine for anti-rejection.     4. Aortic atherosclerosis  This is a new diagnosis as visualized on CXR 8/8/2016. Recommend good BP and lipid control, continue ASA and follow up with PCP for further recommendations.     5. CKD (chronic kidney disease) stage 4, GFR 15-29 ml/min  Stable. Was seeing Dr Crawford, however has not followed up in about a year. Recommend returning to his care.    6. Gastroesophageal reflux disease without esophagitis  Stable and controlled with Prilosec. Continue current treatment plan as previously prescribed with your PCP    7. Immunocompromised patient  Due to chronic use of cyclosporine for transplanted heart. Discussed good infection prevention measures. Aware of measures and will continue.     8. Fibromyalgia  Stable and controlled on lyrica and baclofen. Cymbalta also likely playing a roll in improving this. Continue current treatment plan as previously prescribed with your PCP    9. Anxiety  Stable and controlled on Buspar. Continue current treatment plan as previously prescribed with your PCP    10. Chronic major depressive disorder, recurrent episode  Stable and controlled on Cymbalta. Continue current treatment plan as previously prescribed with your PCP    11. Insomnia, unspecified type  Likely due to chronic medical conditions and mood disorders. Stable on nightly Ambien and Restoril. Discussed good sleep hygiene. Continue current treatment plan as previously prescribed with your PCP    12. Iron  deficiency anemia, unspecified iron deficiency anemia type  Stable on fergon. Does have some constipation issues with this medication which is controlled with docusate. Continue current treatment plan as previously prescribed with your PCP    13. Vaginal atrophy  GYN, Dr Villalobos is treating this with estrace cream. Pt reports good results with this. Continue to follow with Gyn as directed.     14. Need for influenza vaccination  Will RTC next month for immunization or will get at local [facility] and notify clinic of completion.     15. Encounter for screening mammogram for breast cancer  Scheduled mammo as ordered by Dr Villalobos for end of year.       Provided Nadia with a 5-10 year written screening schedule and personal prevention plan. Recommendations were developed using the USPSTF age appropriate recommendations. Education, counseling, and referrals were provided as needed. After Visit Summary printed and given to patient which includes a list of additional screenings\tests needed.    As scheduled with PCP 10/12/2017. Return to care with Dr Crawford. Keep all appointments as scheduled with other specialists.    ROSSANA Willett,FNP-C

## 2017-08-15 NOTE — PATIENT INSTRUCTIONS
Counseling and Referral of Other Preventative  (Italic type indicates deductible and co-insurance are waived)    Patient Name: Nadia Damon  Today's Date: 8/15/2017      SERVICE LIMITATIONS RECOMMENDATION    Vaccines    · Pneumococcal (once after 65)    · Influenza (annually)    · Hepatitis B (if medium/high risk)    · Prevnar 13      Hepatitis B medium/high risk factors:       - End-stage renal disease       - Hemophiliacs who received Factor VII or         IX concentrates       - Clients of institutions for the mentally             retarded       - Persons who live in the same house as          a HepB carrier       - Homosexual men       - Illicit injectable drug abusers     Pneumococcal: Due 3/2/2017     Influenza: Done, repeat in one year     Hepatitis B: Done, no repeat necessary     Prevnar 13: Done, no repeat necessary    Mammogram (biennial age 50-74)  Annually (age 40 or over)  Last done 11/28/2017, recommend to repeat every 1  years    Pap (up to age 70 and after 70 if unknown history or abnormal study last 10 years)    N/A     The USPSTF recommends against screening for cervical cancer in women who have had a hysterectomy with removal of the cervix and who do not have a history of a high-grade precancerous lesion (cervical intraepithelial neoplasia [MALACHI] grade 2 or 3) or cervical cancer.     Colorectal cancer screening (to age 75)    · Fecal occult blood test (annual)  · Flexible sigmoidoscopy (5y)  · Screening colonoscopy (10y)  · Barium enema   Last done 2/3/2014, recommend to repeat every 10  years    Diabetes self-management training (no USPSTF recommendations)  Requires referral by treating physician for patient with diabetes or renal disease. 10 hours of initial DSMT sessions of no less than 30 minutes each in a continuous 12-month period. 2 hours of follow-up DSMT in subsequent years.  N/A    Bone mass measurements (age 65 & older, biennial)  Requires diagnosis related to osteoporosis or  estrogen deficiency. Biennial benefit unless patient has history of long-term glucocorticoid  Starting at 65    Glaucoma screening (no USPSTF recommendation)  Diabetes mellitus, family history   , age 50 or over    American, age 65 or over  Done this year, repeat every year    Medical nutrition therapy for diabetes or renal disease (no recommended schedule)  Requires referral by treating physician for patient with diabetes or renal disease or kidney transplant within the past 3 years.  Can be provided in same year as diabetes self-management training (DSMT), and CMS recommends medical nutrition therapy take place after DSMT. Up to 3 hours for initial year and 2 hours in subsequent years.  N/A    Cardiovascular screening blood tests (every 5 years)  · Fasting lipid panel  Order as a panel if possible  Done this year, repeat every year    Diabetes screening tests (at least every 3 years, Medicare covers annually or at 6-month intervals for prediabetic patients)  · Fasting blood sugar (FBS) or glucose tolerance test (GTT)  Patient must be diagnosed with one of the following:       - Hypertension       - Dyslipidemia       - Obesity (BMI 30kg/m2)       - Previous elevated impaired FBS or GTT       ... or any two of the following:       - Overweight (BMI 25 but <30)       - Family history of diabetes       - Age 65 or older       - History of gestational diabetes or birth of baby weighing more than 9 pounds  Done this year, repeat every year    HIV screening (annually for increased risk patients)  · HIV-1 and HIV-2 by EIA, or ANALY, rapid antibody test or oral mucosa transudate  Patients must be at increased risk for HIV infection per USPSTF guidelines or pregnant. Tests covered annually for patient at increased risk or as requested by the patient. Pregnant patients may receive up to 3 tests during pregnancy.  Risks discussed, screening is not recommended    Smoking cessation counseling (up to 8  sessions per year)  Patients must be asymptomatic of tobacco-related conditions to receive as a preventative service.  Non-smoker    Subsequent annual wellness visit  At least 12 months since last AWV  Return in one year     The following information is provided to all patients.  This information is to help you find resources for any of the problems found today that may be affecting your health:                Living healthy guide: www.[facility].[place].[facility]      Understanding Diabetes: www.diabetes.org      Eating healthy: www.cdc.gov/healthyweight      CDC home safety checklist: www.cdc.gov/steadi/patient.html      Agency on Aging: www.goea.[place].[facility]      Alcoholics anonymous (AA): www.aa.org      Physical Activity: www.thea.nih.gov/oe1qroo      Tobacco use: www.quitwithusla.org

## 2017-08-21 DIAGNOSIS — M79.7 FIBROMYALGIA: ICD-10-CM

## 2017-08-21 DIAGNOSIS — F41.9 ANXIETY: Chronic | ICD-10-CM

## 2017-08-21 DIAGNOSIS — F51.01 PRIMARY INSOMNIA: ICD-10-CM

## 2017-08-22 RX ORDER — BUSPIRONE HYDROCHLORIDE 10 MG/1
10 TABLET ORAL DAILY PRN
Qty: 90 TABLET | Refills: 0 | Status: SHIPPED | OUTPATIENT
Start: 2017-08-22 | End: 2017-12-28 | Stop reason: SDUPTHER

## 2017-08-22 RX ORDER — PREGABALIN 50 MG/1
CAPSULE ORAL
Qty: 180 CAPSULE | Refills: 1 | Status: SHIPPED | OUTPATIENT
Start: 2017-08-22 | End: 2018-04-13 | Stop reason: SDUPTHER

## 2017-08-22 RX ORDER — TEMAZEPAM 30 MG/1
CAPSULE ORAL
Qty: 30 CAPSULE | Refills: 5 | Status: SHIPPED | OUTPATIENT
Start: 2017-08-22 | End: 2018-01-23 | Stop reason: SDUPTHER

## 2017-09-01 DIAGNOSIS — N18.4 CKD (CHRONIC KIDNEY DISEASE) STAGE 4, GFR 15-29 ML/MIN: Primary | ICD-10-CM

## 2017-09-05 ENCOUNTER — LAB VISIT (OUTPATIENT)
Dept: LAB | Facility: HOSPITAL | Age: 62
End: 2017-09-05
Attending: INTERNAL MEDICINE
Payer: MEDICARE

## 2017-09-05 DIAGNOSIS — N18.4 CKD (CHRONIC KIDNEY DISEASE) STAGE 4, GFR 15-29 ML/MIN: ICD-10-CM

## 2017-09-05 LAB
ANION GAP SERPL CALC-SCNC: 10 MMOL/L
BASOPHILS # BLD AUTO: 0.02 K/UL
BASOPHILS NFR BLD: 0.5 %
BUN SERPL-MCNC: 33 MG/DL
CALCIUM SERPL-MCNC: 9.4 MG/DL
CHLORIDE SERPL-SCNC: 101 MMOL/L
CO2 SERPL-SCNC: 27 MMOL/L
CREAT SERPL-MCNC: 1.8 MG/DL
DIFFERENTIAL METHOD: ABNORMAL
EOSINOPHIL # BLD AUTO: 0.1 K/UL
EOSINOPHIL NFR BLD: 3.8 %
ERYTHROCYTE [DISTWIDTH] IN BLOOD BY AUTOMATED COUNT: 13.7 %
EST. GFR  (AFRICAN AMERICAN): 34 ML/MIN/1.73 M^2
EST. GFR  (NON AFRICAN AMERICAN): 30 ML/MIN/1.73 M^2
GLUCOSE SERPL-MCNC: 138 MG/DL
HCT VFR BLD AUTO: 32.5 %
HGB BLD-MCNC: 10.6 G/DL
LYMPHOCYTES # BLD AUTO: 1.4 K/UL
LYMPHOCYTES NFR BLD: 38.3 %
MCH RBC QN AUTO: 28.6 PG
MCHC RBC AUTO-ENTMCNC: 32.6 G/DL
MCV RBC AUTO: 88 FL
MONOCYTES # BLD AUTO: 0.4 K/UL
MONOCYTES NFR BLD: 10.9 %
NEUTROPHILS # BLD AUTO: 1.7 K/UL
NEUTROPHILS NFR BLD: 46.5 %
PLATELET # BLD AUTO: 214 K/UL
PMV BLD AUTO: 9.1 FL
POTASSIUM SERPL-SCNC: 5.4 MMOL/L
RBC # BLD AUTO: 3.71 M/UL
SODIUM SERPL-SCNC: 138 MMOL/L
WBC # BLD AUTO: 3.66 K/UL

## 2017-09-05 PROCEDURE — 80048 BASIC METABOLIC PNL TOTAL CA: CPT | Mod: PO

## 2017-09-05 PROCEDURE — 85025 COMPLETE CBC W/AUTO DIFF WBC: CPT | Mod: PO

## 2017-09-05 PROCEDURE — 36415 COLL VENOUS BLD VENIPUNCTURE: CPT | Mod: PO

## 2017-09-06 ENCOUNTER — OFFICE VISIT (OUTPATIENT)
Dept: NEPHROLOGY | Facility: CLINIC | Age: 62
End: 2017-09-06
Payer: MEDICARE

## 2017-09-06 VITALS
DIASTOLIC BLOOD PRESSURE: 80 MMHG | HEIGHT: 62 IN | WEIGHT: 142.19 LBS | BODY MASS INDEX: 26.17 KG/M2 | HEART RATE: 92 BPM | SYSTOLIC BLOOD PRESSURE: 134 MMHG

## 2017-09-06 DIAGNOSIS — E87.5 HYPERKALEMIA: ICD-10-CM

## 2017-09-06 DIAGNOSIS — T45.1X5S CALCINEURIN INHIBITOR TOXICITY, THERAPEUTIC USE, SEQUELA: ICD-10-CM

## 2017-09-06 DIAGNOSIS — Z94.1 HEART TRANSPLANTED: ICD-10-CM

## 2017-09-06 DIAGNOSIS — N18.30 CHRONIC KIDNEY DISEASE, STAGE III (MODERATE): Primary | ICD-10-CM

## 2017-09-06 DIAGNOSIS — R60.1 GENERALIZED EDEMA: ICD-10-CM

## 2017-09-06 DIAGNOSIS — I10 ESSENTIAL HYPERTENSION: ICD-10-CM

## 2017-09-06 PROCEDURE — 99999 PR PBB SHADOW E&M-EST. PATIENT-LVL IV: CPT | Mod: PBBFAC,,, | Performed by: INTERNAL MEDICINE

## 2017-09-06 PROCEDURE — 3008F BODY MASS INDEX DOCD: CPT | Mod: S$GLB,,, | Performed by: INTERNAL MEDICINE

## 2017-09-06 PROCEDURE — 99215 OFFICE O/P EST HI 40 MIN: CPT | Mod: S$GLB,,, | Performed by: INTERNAL MEDICINE

## 2017-09-06 PROCEDURE — 3079F DIAST BP 80-89 MM HG: CPT | Mod: S$GLB,,, | Performed by: INTERNAL MEDICINE

## 2017-09-06 PROCEDURE — 99499 UNLISTED E&M SERVICE: CPT | Mod: S$GLB,,, | Performed by: INTERNAL MEDICINE

## 2017-09-06 PROCEDURE — 3075F SYST BP GE 130 - 139MM HG: CPT | Mod: S$GLB,,, | Performed by: INTERNAL MEDICINE

## 2017-09-07 ENCOUNTER — OFFICE VISIT (OUTPATIENT)
Dept: URGENT CARE | Facility: CLINIC | Age: 62
End: 2017-09-07
Payer: MEDICARE

## 2017-09-07 VITALS
BODY MASS INDEX: 26.96 KG/M2 | HEART RATE: 99 BPM | OXYGEN SATURATION: 97 % | SYSTOLIC BLOOD PRESSURE: 132 MMHG | WEIGHT: 146.5 LBS | DIASTOLIC BLOOD PRESSURE: 78 MMHG | HEIGHT: 62 IN

## 2017-09-07 DIAGNOSIS — J06.9 VIRAL URI: Primary | ICD-10-CM

## 2017-09-07 DIAGNOSIS — R52 BODY ACHES: ICD-10-CM

## 2017-09-07 PROCEDURE — 3078F DIAST BP <80 MM HG: CPT | Mod: S$GLB,,, | Performed by: PHYSICIAN ASSISTANT

## 2017-09-07 PROCEDURE — 3075F SYST BP GE 130 - 139MM HG: CPT | Mod: S$GLB,,, | Performed by: PHYSICIAN ASSISTANT

## 2017-09-07 PROCEDURE — 99213 OFFICE O/P EST LOW 20 MIN: CPT | Mod: S$GLB,,, | Performed by: PHYSICIAN ASSISTANT

## 2017-09-07 PROCEDURE — 99999 PR PBB SHADOW E&M-EST. PATIENT-LVL III: CPT | Mod: PBBFAC,,, | Performed by: PHYSICIAN ASSISTANT

## 2017-09-07 PROCEDURE — 3008F BODY MASS INDEX DOCD: CPT | Mod: S$GLB,,, | Performed by: PHYSICIAN ASSISTANT

## 2017-09-07 RX ORDER — FLUTICASONE PROPIONATE 50 MCG
2 SPRAY, SUSPENSION (ML) NASAL DAILY
Qty: 1 BOTTLE | Refills: 0 | Status: SHIPPED | OUTPATIENT
Start: 2017-09-07 | End: 2021-07-07

## 2017-09-07 NOTE — PATIENT INSTRUCTIONS
Viral Upper Respiratory Illness (Adult)  You have a viral upper respiratory illness (URI), which is another term for the common cold. This illness is contagious during the first few days. It is spread through the air by coughing and sneezing. It may also be spread by direct contact (touching the sick person and then touching your own eyes, nose, or mouth). Frequent handwashing will decrease risk of spread. Most viral illnesses go away within 7 to 10 days with rest and simple home remedies. Sometimes the illness may last for several weeks. Antibiotics will not kill a virus, and they are generally not prescribed for this condition.    Home care  · If symptoms are severe, rest at home for the first 2 to 3 days. When you resume activity, don't let yourself get too tired.  · Avoid being exposed to cigarette smoke (yours or others).  · You may use acetaminophen or ibuprofen to control pain and fever, unless another medicine was prescribed. (Note: If you have chronic liver or kidney disease, have ever had a stomach ulcer or gastrointestinal bleeding, or are taking blood-thinning medicines, talk with your healthcare provider before using these medicines.) Aspirin should never be given to anyone under 18 years of age who is ill with a viral infection or fever. It may cause severe liver or brain damage.  · Your appetite may be poor, so a light diet is fine. Avoid dehydration by drinking 6 to 8 glasses of fluids per day (water, soft drinks, juices, tea, or soup). Extra fluids will help loosen secretions in the nose and lungs.  · Over-the-counter cold medicines will not shorten the length of time youre sick, but they may be helpful for the following symptoms: cough, sore throat, and nasal and sinus congestion. (Note: Do not use decongestants if you have high blood pressure.)  Follow-up care  Follow up with your healthcare provider, or as advised.  When to seek medical advice  Call your healthcare provider right away if any  of these occur:  · Cough with lots of colored sputum (mucus)  · Severe headache; face, neck, or ear pain  · Difficulty swallowing due to throat pain  · Fever of 100.4°F (38°C)  Call 911, or get immediate medical care  Call emergency services right away if any of these occur:  · Chest pain, shortness of breath, wheezing, or difficulty breathing  · Coughing up blood  · Inability to swallow due to throat pain  Date Last Reviewed: 9/13/2015 © 2000-2016 [employer]. [address] [place] [address], [place] [address]. All rights reserved. This information is not intended as a substitute for professional medical care. Always follow your healthcare professional's instructions.    Rest.  Drink plenty of clear fluids--at least 64 ounces of water/juice.  Normal saline nasal wash to irrigate sinuses and for congestion/runny nose.  Cool mist humidifier/vaporizer.  Practice good handwashing.  Zyrtec or Claritin to help dry mucus and post nasal drip.  Mucinex or Mucinex DM for cough and chest congestion.  Tylenol or Ibuprofen for fever, headache and body aches.  Warm salt water gargles for throat comfort.  Chloraseptic spray or lozenges for throat comfort.  See PCP or go to ER if symptoms worsen or fail to improve with treatment.

## 2017-09-07 NOTE — PROGRESS NOTES
"Subjective:      Patient ID: Nadia Damon is a 62 y.o. female.    Chief Complaint: Nasal Congestion and Generalized Body Aches    URI    This is a new problem. The current episode started in the past 7 days (4days). Associated symptoms include congestion, headaches, rhinorrhea and sinus pain. Pertinent negatives include no abdominal pain, coughing, diarrhea, ear pain, nausea, rash, sore throat, vomiting or wheezing. Treatments tried: OTC allergy medication. The treatment provided no relief.     Review of Systems   Constitutional: Positive for chills. Negative for diaphoresis and fever.   HENT: Positive for congestion, rhinorrhea, sinus pain and sinus pressure. Negative for ear pain and sore throat.    Respiratory: Negative for cough, shortness of breath and wheezing.    Gastrointestinal: Negative for abdominal pain, constipation, diarrhea, nausea and vomiting.   Musculoskeletal:        Generalized body aches   Skin: Negative for rash.   Neurological: Positive for headaches. Negative for dizziness and light-headedness.       Objective:   /78 (BP Location: Right arm, Patient Position: Sitting, BP Method: Small (Manual))   Pulse 99   Ht 5' 2" (1.575 m)   Wt 66.4 kg (146 lb 7.9 oz)   LMP 06/20/1983 (Within Years)   SpO2 97%   BMI 26.79 kg/m²   Physical Exam   Constitutional: She appears well-developed and well-nourished. She does not appear ill. No distress.   HENT:   Head: Normocephalic and atraumatic.   Right Ear: Tympanic membrane and ear canal normal. No tenderness. Tympanic membrane is not erythematous.   Left Ear: Tympanic membrane and ear canal normal. No tenderness. Tympanic membrane is not erythematous.   Nose: Nose normal. Right sinus exhibits no maxillary sinus tenderness and no frontal sinus tenderness. Left sinus exhibits no maxillary sinus tenderness and no frontal sinus tenderness.   Mouth/Throat: Uvula is midline and oropharynx is clear and moist.   Cardiovascular: Normal rate, regular " rhythm and normal heart sounds.    No murmur heard.  Pulmonary/Chest: Effort normal and breath sounds normal. No respiratory distress. She has no decreased breath sounds. She has no wheezes. She has no rhonchi. She has no rales.   Lymphadenopathy:     She has no cervical adenopathy.   Skin: Skin is warm and dry. No rash noted. She is not diaphoretic.   Psychiatric: She has a normal mood and affect. Her speech is normal and behavior is normal. Thought content normal.     Assessment:      1. Viral URI    2. Body aches       Plan:   Viral URI  -     fluticasone (FLONASE) 50 mcg/actuation nasal spray; 2 sprays by Each Nare route once daily.  Dispense: 1 Bottle; Refill: 0    Body aches  -     POCT Influenza A/B    Reviewed negative in office flu with patient.     Advised patient based on time frame and symptomology this is likely a viral upper respiratory infection.  It does not require antibiotics at this time.    Will treat symptoms with OTC meds.  If no improvement, or if condition worsens, follow up with PCP.     Gave patient handout on URI.  Printed and reviewed AVS.     Further patient instruction:  Rest.  Drink plenty of clear fluids--at least 64 ounces of water/juice.  Normal saline nasal wash to irrigate sinuses and for congestion/runny nose.  Cool mist humidifier/vaporizer.  Practice good handwashing.  Zyrtec or Claritin to help dry mucus and post nasal drip.  Mucinex or Mucinex DM for cough and chest congestion.  Tylenol or Ibuprofen for fever, headache and body aches.  Warm salt water gargles for throat comfort.  Chloraseptic spray or lozenges for throat comfort.  See PCP or go to ER if symptoms worsen or fail to improve with treatment.    Watch elevated BP with decongestants; if this occurs d/c medication.

## 2017-09-26 RX ORDER — CYCLOSPORINE 100 MG/1
CAPSULE, LIQUID FILLED ORAL
Qty: 90 CAPSULE | Refills: 3 | Status: SHIPPED | OUTPATIENT
Start: 2017-09-26 | End: 2018-10-02 | Stop reason: SDUPTHER

## 2017-09-26 RX ORDER — CYCLOSPORINE 25 MG/1
CAPSULE, LIQUID FILLED ORAL
Qty: 270 CAPSULE | Refills: 3 | Status: SHIPPED | OUTPATIENT
Start: 2017-09-26 | End: 2018-10-02 | Stop reason: SDUPTHER

## 2017-10-10 ENCOUNTER — TELEPHONE (OUTPATIENT)
Dept: DERMATOLOGY | Facility: CLINIC | Age: 62
End: 2017-10-10

## 2017-10-10 NOTE — TELEPHONE ENCOUNTER
----- Message from Claire Bella sent at 10/10/2017  2:28 PM CDT -----  Contact: PT   PT called requested call back because she is still at her 1:00pm appointment with her Cardio doctor. Might not make appointment wants to know how much time she has. ..[phone] (home)

## 2017-10-10 NOTE — TELEPHONE ENCOUNTER
----- Message from Kathy Calloway sent at 10/10/2017  3:17 PM CDT -----  Contact: pt  Calling to reschedule her appointment .[phone] (home)

## 2017-10-16 ENCOUNTER — OFFICE VISIT (OUTPATIENT)
Dept: INTERNAL MEDICINE | Facility: CLINIC | Age: 62
End: 2017-10-16
Payer: MEDICARE

## 2017-10-16 VITALS
WEIGHT: 146.94 LBS | TEMPERATURE: 98 F | DIASTOLIC BLOOD PRESSURE: 81 MMHG | HEART RATE: 86 BPM | BODY MASS INDEX: 27.04 KG/M2 | HEIGHT: 62 IN | SYSTOLIC BLOOD PRESSURE: 124 MMHG | OXYGEN SATURATION: 95 %

## 2017-10-16 DIAGNOSIS — I10 ESSENTIAL HYPERTENSION: Primary | ICD-10-CM

## 2017-10-16 DIAGNOSIS — Z28.39 IMMUNIZATION DEFICIENCY: ICD-10-CM

## 2017-10-16 DIAGNOSIS — Z94.1 HEART TRANSPLANTED: ICD-10-CM

## 2017-10-16 DIAGNOSIS — Z12.39 BREAST SCREENING: ICD-10-CM

## 2017-10-16 PROCEDURE — 99213 OFFICE O/P EST LOW 20 MIN: CPT | Mod: S$GLB,,, | Performed by: FAMILY MEDICINE

## 2017-10-16 PROCEDURE — 99999 PR PBB SHADOW E&M-EST. PATIENT-LVL V: CPT | Mod: PBBFAC,,, | Performed by: FAMILY MEDICINE

## 2017-10-16 PROCEDURE — G0008 ADMIN INFLUENZA VIRUS VAC: HCPCS | Mod: S$GLB,,, | Performed by: FAMILY MEDICINE

## 2017-10-16 PROCEDURE — 90686 IIV4 VACC NO PRSV 0.5 ML IM: CPT | Mod: S$GLB,,, | Performed by: FAMILY MEDICINE

## 2017-10-16 PROCEDURE — 99499 UNLISTED E&M SERVICE: CPT | Mod: S$GLB,,, | Performed by: FAMILY MEDICINE

## 2017-10-16 NOTE — PROGRESS NOTES
Subjective:       Patient ID: Nadia Damon is a 62 y.o. female.    Chief Complaint: Follow-up    Routine follow-up hypertension.  She is status post heart transplant.  She has chronic kidney disease stage IV.  She is also followed by cardiology.  She is requested a flu immunization today.  She denies any chest pain palpitations shortness of breath.  She has some edema.  Cardiology recommended to continue current medications and to start support hose.      Review of Systems   Constitutional: Negative for appetite change, fatigue and unexpected weight change.   Respiratory: Negative for cough, shortness of breath and wheezing.    Cardiovascular: Positive for leg swelling. Negative for chest pain and palpitations.   Gastrointestinal: Negative for abdominal pain.   Genitourinary: Negative for difficulty urinating.   Neurological: Negative for headaches.       Objective:      Physical Exam   Constitutional: She is oriented to person, place, and time. She appears well-developed and well-nourished. No distress.   Neck: Neck supple. No thyromegaly present.   Cardiovascular: Normal rate, regular rhythm and normal heart sounds.    No murmur heard.  1+ edema lower extremities with no tenderness or redness   Pulmonary/Chest: Effort normal and breath sounds normal. No respiratory distress. She has no wheezes.   Abdominal: Soft. Bowel sounds are normal. She exhibits no mass. There is no tenderness.   Lymphadenopathy:     She has no cervical adenopathy.   Neurological: She is alert and oriented to person, place, and time.       Lab Visit on 09/05/2017   Component Date Value Ref Range Status    WBC 09/05/2017 3.66* 3.90 - 12.70 K/uL Final    RBC 09/05/2017 3.71* 4.00 - 5.40 M/uL Final    Hemoglobin 09/05/2017 10.6* 12.0 - 16.0 g/dL Final    Hematocrit 09/05/2017 32.5* 37.0 - 48.5 % Final    MCV 09/05/2017 88  82 - 98 fL Final    MCH 09/05/2017 28.6  27.0 - 31.0 pg Final    MCHC 09/05/2017 32.6  32.0 - 36.0 g/dL Final     RDW 09/05/2017 13.7  11.5 - 14.5 % Final    Platelets 09/05/2017 214  150 - 350 K/uL Final    MPV 09/05/2017 9.1* 9.2 - 12.9 fL Final    Gran # 09/05/2017 1.7* 1.8 - 7.7 K/uL Final    Lymph # 09/05/2017 1.4  1.0 - 4.8 K/uL Final    Mono # 09/05/2017 0.4  0.3 - 1.0 K/uL Final    Eos # 09/05/2017 0.1  0.0 - 0.5 K/uL Final    Baso # 09/05/2017 0.02  0.00 - 0.20 K/uL Final    Gran% 09/05/2017 46.5  38.0 - 73.0 % Final    Lymph% 09/05/2017 38.3  18.0 - 48.0 % Final    Mono% 09/05/2017 10.9  4.0 - 15.0 % Final    Eosinophil% 09/05/2017 3.8  0.0 - 8.0 % Final    Basophil% 09/05/2017 0.5  0.0 - 1.9 % Final    Differential Method 09/05/2017 Automated   Final    Sodium 09/05/2017 138  136 - 145 mmol/L Final    Potassium 09/05/2017 5.4* 3.5 - 5.1 mmol/L Final    Chloride 09/05/2017 101  95 - 110 mmol/L Final    CO2 09/05/2017 27  23 - 29 mmol/L Final    Glucose 09/05/2017 138* 70 - 110 mg/dL Final    BUN, Bld 09/05/2017 33* 8 - 23 mg/dL Final    Creatinine 09/05/2017 1.8* 0.5 - 1.4 mg/dL Final    Calcium 09/05/2017 9.4  8.7 - 10.5 mg/dL Final    Anion Gap 09/05/2017 10  8 - 16 mmol/L Final    eGFR if  09/05/2017 34* >60 mL/min/1.73 m^2 Final    eGFR if non African American 09/05/2017 30* >60 mL/min/1.73 m^2 Final     Assessment:       1. Immunization deficiency    2. Breast screening         blood pressure control 124/81.  Continue current medications.  Flu vaccine is given.  She is due a mammogram in one month.  Follow-up in 4 months.    Immunization deficiency  -     Influenza - Quadrivalent (3 years & older) (PF)    Breast screening  -     Mammo Digital Screening Bilat with CAD; Future; Expected date: 11/29/2017

## 2017-11-03 ENCOUNTER — LAB VISIT (OUTPATIENT)
Dept: LAB | Facility: HOSPITAL | Age: 62
End: 2017-11-03
Attending: INTERNAL MEDICINE
Payer: MEDICARE

## 2017-11-03 ENCOUNTER — OFFICE VISIT (OUTPATIENT)
Dept: DERMATOLOGY | Facility: CLINIC | Age: 62
End: 2017-11-03
Payer: MEDICARE

## 2017-11-03 DIAGNOSIS — Z79.899 ENCOUNTER FOR LONG-TERM (CURRENT) USE OF MEDICATIONS: ICD-10-CM

## 2017-11-03 DIAGNOSIS — L65.8 TRACTION ALOPECIA: Primary | ICD-10-CM

## 2017-11-03 DIAGNOSIS — Z79.52 LONG TERM CURRENT USE OF SYSTEMIC STEROIDS: ICD-10-CM

## 2017-11-03 DIAGNOSIS — L65.9 HAIR THINNING: ICD-10-CM

## 2017-11-03 DIAGNOSIS — Z94.1 STATUS POST HEART TRANSPLANT: ICD-10-CM

## 2017-11-03 DIAGNOSIS — E78.5 HYPERLIPIDEMIA, UNSPECIFIED HYPERLIPIDEMIA TYPE: ICD-10-CM

## 2017-11-03 DIAGNOSIS — T86.20 COMPLICATION OF HEART TRANSPLANT, UNSPECIFIED COMPLICATION: ICD-10-CM

## 2017-11-03 LAB
ALBUMIN SERPL BCP-MCNC: 3.3 G/DL
ALP SERPL-CCNC: 108 U/L
ALT SERPL W/O P-5'-P-CCNC: 22 U/L
ANION GAP SERPL CALC-SCNC: 8 MMOL/L
AST SERPL-CCNC: 34 U/L
BASOPHILS # BLD AUTO: 0.01 K/UL
BASOPHILS NFR BLD: 0.3 %
BILIRUB SERPL-MCNC: 0.6 MG/DL
BNP SERPL-MCNC: 266 PG/ML
BUN SERPL-MCNC: 31 MG/DL
CALCIUM SERPL-MCNC: 9.4 MG/DL
CHLORIDE SERPL-SCNC: 102 MMOL/L
CHOLEST SERPL-MCNC: 172 MG/DL
CHOLEST/HDLC SERPL: 3.6 {RATIO}
CO2 SERPL-SCNC: 30 MMOL/L
CREAT SERPL-MCNC: 2.1 MG/DL
DIFFERENTIAL METHOD: ABNORMAL
EOSINOPHIL # BLD AUTO: 0.1 K/UL
EOSINOPHIL NFR BLD: 2.9 %
ERYTHROCYTE [DISTWIDTH] IN BLOOD BY AUTOMATED COUNT: 14.3 %
EST. GFR  (AFRICAN AMERICAN): 28.3 ML/MIN/1.73 M^2
EST. GFR  (NON AFRICAN AMERICAN): 24.5 ML/MIN/1.73 M^2
GLUCOSE SERPL-MCNC: 86 MG/DL
HCT VFR BLD AUTO: 29.8 %
HDLC SERPL-MCNC: 48 MG/DL
HDLC SERPL: 27.9 %
HGB BLD-MCNC: 9.2 G/DL
IMM GRANULOCYTES # BLD AUTO: 0 K/UL
IMM GRANULOCYTES NFR BLD AUTO: 0 %
LDLC SERPL CALC-MCNC: 96.2 MG/DL
LYMPHOCYTES # BLD AUTO: 1.3 K/UL
LYMPHOCYTES NFR BLD: 42 %
MAGNESIUM SERPL-MCNC: 1.8 MG/DL
MCH RBC QN AUTO: 27.9 PG
MCHC RBC AUTO-ENTMCNC: 30.9 G/DL
MCV RBC AUTO: 90 FL
MONOCYTES # BLD AUTO: 0.3 K/UL
MONOCYTES NFR BLD: 10.4 %
NEUTROPHILS # BLD AUTO: 1.4 K/UL
NEUTROPHILS NFR BLD: 44.4 %
NONHDLC SERPL-MCNC: 124 MG/DL
NRBC BLD-RTO: 0 /100 WBC
PLATELET # BLD AUTO: 226 K/UL
PMV BLD AUTO: 10 FL
POTASSIUM SERPL-SCNC: 4.1 MMOL/L
PROT SERPL-MCNC: 8.6 G/DL
RBC # BLD AUTO: 3.3 M/UL
SODIUM SERPL-SCNC: 140 MMOL/L
TRIGL SERPL-MCNC: 139 MG/DL
WBC # BLD AUTO: 3.07 K/UL

## 2017-11-03 PROCEDURE — 80158 DRUG ASSAY CYCLOSPORINE: CPT

## 2017-11-03 PROCEDURE — 85025 COMPLETE CBC W/AUTO DIFF WBC: CPT

## 2017-11-03 PROCEDURE — 11900 INJECT SKIN LESIONS </W 7: CPT | Mod: S$GLB,,, | Performed by: DERMATOLOGY

## 2017-11-03 PROCEDURE — 86977 RBC SERUM PRETX INCUBJ/INHIB: CPT | Mod: 91

## 2017-11-03 PROCEDURE — 80061 LIPID PANEL: CPT

## 2017-11-03 PROCEDURE — 36415 COLL VENOUS BLD VENIPUNCTURE: CPT | Mod: PO

## 2017-11-03 PROCEDURE — 86833 HLA CLASS II HIGH DEFIN QUAL: CPT | Mod: 91

## 2017-11-03 PROCEDURE — 99214 OFFICE O/P EST MOD 30 MIN: CPT | Mod: 25,S$GLB,, | Performed by: DERMATOLOGY

## 2017-11-03 PROCEDURE — 86832 HLA CLASS I HIGH DEFIN QUAL: CPT

## 2017-11-03 PROCEDURE — 99999 PR PBB SHADOW E&M-EST. PATIENT-LVL II: CPT | Mod: PBBFAC,,, | Performed by: DERMATOLOGY

## 2017-11-03 PROCEDURE — 83735 ASSAY OF MAGNESIUM: CPT

## 2017-11-03 PROCEDURE — 80053 COMPREHEN METABOLIC PANEL: CPT

## 2017-11-03 PROCEDURE — 83880 ASSAY OF NATRIURETIC PEPTIDE: CPT

## 2017-11-03 PROCEDURE — 86832 HLA CLASS I HIGH DEFIN QUAL: CPT | Mod: 91

## 2017-11-03 RX ORDER — MOMETASONE FUROATE 1 MG/ML
SOLUTION TOPICAL DAILY
Qty: 180 ML | Refills: 3 | Status: SHIPPED | OUTPATIENT
Start: 2017-11-03 | End: 2021-11-22

## 2017-11-03 RX ORDER — FERROUS GLUCONATE 324(38)MG
325 TABLET ORAL
COMMUNITY
End: 2018-01-12 | Stop reason: SDUPTHER

## 2017-11-03 NOTE — PROGRESS NOTES
Subjective:       Patient ID:  Nadia Damon is a 62 y.o. female who presents for   Chief Complaint   Patient presents with    Warts     f/u has improved    Dry Skin     amalactin nightly    Hair Loss     c/o hair loss and thinning of scalp x6 months     Hx of KP and viral warts (resolved s/p cryo), here today for new issue, last seen on 7/24/17.     History of Present Illness: The patient presents with chief complaint of hair loss.  Location: scalp  Duration: 6 months  Signs/Symptoms: balding    Prior treatments: vitamin E capsules x 2 weeks w/ improvement.     Use chemical relaxers once/month x 50 years.  She also uses hair dye.  Has tried low traction hair styles.     + hx of hair loss in family          Review of Systems   Constitutional: Negative for fever and chills.   Skin: Negative for itching, rash, daily sunscreen use, activity-related sunscreen use and recent sunburn.   Hematologic/Lymphatic: Does not bruise/bleed easily.        Objective:    Physical Exam   Constitutional: She appears well-developed and well-nourished. No distress.   Neurological: She is alert and oriented to person, place, and time. She is not disoriented.   Psychiatric: She has a normal mood and affect.   Skin:   Areas Examined (abnormalities noted in diagram):   Scalp / Hair Palpated and Inspected  Head / Face Inspection Performed  Neck Inspection Performed  RUE Inspected  LUE Inspection Performed  Nails and Digits Inspection Performed                 Assessment / Plan:        Traction alopecia  Hair thinning  -     triamcinolone acetonide injection 10 mg; 1 mL (10 mg total) by Other route one time.  -     mometasone (ELOCON) 0.1 % lotion; Apply topically once daily.  Dispense: 180 mL; Refill: 3  -     Recommend d/c relaxers and hair dyes. Will start mometasone lotion (fluocinolone oil unlikely to be covered) qD.  Continue biotin 10,000 mcg daily.  After risks, benefits and alternatives explained and side effect profile  reviewed, including hypopigmentation, telangiectasia and atrophy, the patient verbally consented to ILK injection. A total of 2 cc of kenalog 5 mg/ml was injected into the areas of alopecia of the scalp.  Pt tolerated well without side effects.  RTC in 6 weeks for repeat injection.                 Return in about 6 weeks (around 12/15/2017).

## 2017-11-04 LAB — CYCLOSPORINE BLD LC/MS/MS-MCNC: 113 NG/ML

## 2017-11-06 LAB
CLASS I ANTIBODY COMMENTS - LUMINEX: NORMAL
CLASS II ANTIBODIES - LUMINEX: NORMAL
CLASS II ANTIBODY COMMENTS - LUMINEX: NORMAL
DSA1 TESTING DATE: NORMAL
DSA12 TESTING DATE: NORMAL
DSA2 TESTING DATE: NORMAL
SERUM COLLECTION DT - LUMINEX CLASS I: NORMAL
SERUM COLLECTION DT - LUMINEX CLASS II: NORMAL

## 2017-11-09 LAB
C1Q1 TESTING DATE: NORMAL
C1Q1 TESTING DATE: NORMAL
C1Q2 TESTING DATE: NORMAL
CLASS I ANTIBODY COMMENTS - LUMINEX: NORMAL
CLASS II ANTIBODY COMMENTS - LUMINEX: NORMAL
SERUM COLLECTION DT - LUMINEX CLASS I: NORMAL
SERUM COLLECTION DT - LUMINEX CLASS II: NORMAL

## 2017-11-17 PROBLEM — N25.81 HYPERPARATHYROIDISM, SECONDARY RENAL: Status: ACTIVE | Noted: 2017-11-17

## 2017-12-08 DIAGNOSIS — I10 ESSENTIAL HYPERTENSION: Chronic | ICD-10-CM

## 2017-12-10 RX ORDER — METOPROLOL SUCCINATE 25 MG/1
TABLET, EXTENDED RELEASE ORAL
Qty: 90 TABLET | Refills: 3 | Status: SHIPPED | OUTPATIENT
Start: 2017-12-10 | End: 2018-11-23 | Stop reason: SDUPTHER

## 2017-12-12 ENCOUNTER — OFFICE VISIT (OUTPATIENT)
Dept: INTERNAL MEDICINE | Facility: CLINIC | Age: 62
End: 2017-12-12
Payer: MEDICARE

## 2017-12-12 VITALS
DIASTOLIC BLOOD PRESSURE: 80 MMHG | TEMPERATURE: 98 F | SYSTOLIC BLOOD PRESSURE: 130 MMHG | WEIGHT: 142.5 LBS | OXYGEN SATURATION: 98 % | BODY MASS INDEX: 26.22 KG/M2 | HEIGHT: 62 IN | HEART RATE: 101 BPM

## 2017-12-12 DIAGNOSIS — J02.9 SORE THROAT: Primary | ICD-10-CM

## 2017-12-12 DIAGNOSIS — I10 ESSENTIAL HYPERTENSION: ICD-10-CM

## 2017-12-12 DIAGNOSIS — F41.9 ANXIETY: ICD-10-CM

## 2017-12-12 LAB
CTP QC/QA: YES
S PYO RRNA THROAT QL PROBE: NEGATIVE

## 2017-12-12 PROCEDURE — 96372 THER/PROPH/DIAG INJ SC/IM: CPT | Mod: S$GLB,,, | Performed by: FAMILY MEDICINE

## 2017-12-12 PROCEDURE — 99213 OFFICE O/P EST LOW 20 MIN: CPT | Mod: 25,S$GLB,, | Performed by: FAMILY MEDICINE

## 2017-12-12 PROCEDURE — 87880 STREP A ASSAY W/OPTIC: CPT | Mod: QW,S$GLB,, | Performed by: FAMILY MEDICINE

## 2017-12-12 PROCEDURE — 99999 PR PBB SHADOW E&M-EST. PATIENT-LVL V: CPT | Mod: PBBFAC,,, | Performed by: FAMILY MEDICINE

## 2017-12-12 RX ORDER — DEXAMETHASONE SODIUM PHOSPHATE 100 MG/10ML
10 INJECTION INTRAMUSCULAR; INTRAVENOUS ONCE
Status: COMPLETED | OUTPATIENT
Start: 2017-12-12 | End: 2017-12-12

## 2017-12-12 RX ORDER — ALPRAZOLAM 1 MG/1
1 TABLET ORAL 2 TIMES DAILY
Qty: 60 TABLET | Refills: 0 | Status: SHIPPED | OUTPATIENT
Start: 2017-12-12 | End: 2018-01-12 | Stop reason: SDUPTHER

## 2017-12-12 RX ADMIN — DEXAMETHASONE SODIUM PHOSPHATE 10 MG: 100 INJECTION INTRAMUSCULAR; INTRAVENOUS at 08:12

## 2017-12-12 NOTE — PROGRESS NOTES
Subjective:       Patient ID: Nadia Damon is a 62 y.o. female.    Chief Complaint: Sore Throat    Several days' duration of sore throat.  She reports some fullness of her right ear.  She denies nasal congestion postnasal drip cough fever chills.  She is also requesting refill of Xanax.  She has a  number of  family issues and is feeling anxious not able to sleep.  Medical history includes hypertension and depressive disorder chronic kidney disease and heart  transplant .  She received a flu vaccination earlier this year.  She denies any known recent infection contacts      Sore Throat    Pertinent negatives include no congestion or coughing.     Review of Systems   Constitutional: Negative for chills and fever.   HENT: Positive for sore throat. Negative for congestion, postnasal drip and sinus pressure.    Respiratory: Negative for cough.    Cardiovascular: Negative for chest pain, palpitations and leg swelling.   Psychiatric/Behavioral: The patient is nervous/anxious.        Objective:      Physical Exam   Constitutional: She appears well-developed and well-nourished. No distress.   HENT:   Right Ear: External ear normal.   Left Ear: External ear normal.   Nose: Nose normal.   Mild posterior erythema of the pharynx mild swelling of the uvula   Eyes: Conjunctivae are normal.   Cardiovascular: Normal rate and regular rhythm.    Pulmonary/Chest: Effort normal and breath sounds normal. No stridor.       Lab Visit on 11/03/2017   Component Date Value Ref Range Status    BNP 11/03/2017 266* 0 - 99 pg/mL Final    WBC 11/03/2017 3.07* 3.90 - 12.70 K/uL Final    RBC 11/03/2017 3.30* 4.00 - 5.40 M/uL Final    Hemoglobin 11/03/2017 9.2* 12.0 - 16.0 g/dL Final    Hematocrit 11/03/2017 29.8* 37.0 - 48.5 % Final    MCV 11/03/2017 90  82 - 98 fL Final    MCH 11/03/2017 27.9  27.0 - 31.0 pg Final    MCHC 11/03/2017 30.9* 32.0 - 36.0 g/dL Final    RDW 11/03/2017 14.3  11.5 - 14.5 % Final    Platelets 11/03/2017 226   150 - 350 K/uL Final    MPV 11/03/2017 10.0  9.2 - 12.9 fL Final    Immature Granulocytes 11/03/2017 0.0  0.0 - 0.5 % Final    Gran # 11/03/2017 1.4* 1.8 - 7.7 K/uL Final    Immature Grans (Abs) 11/03/2017 0.00  0.00 - 0.04 K/uL Final    Lymph # 11/03/2017 1.3  1.0 - 4.8 K/uL Final    Mono # 11/03/2017 0.3  0.3 - 1.0 K/uL Final    Eos # 11/03/2017 0.1  0.0 - 0.5 K/uL Final    Baso # 11/03/2017 0.01  0.00 - 0.20 K/uL Final    nRBC 11/03/2017 0  0 /100 WBC Final    Gran% 11/03/2017 44.4  38.0 - 73.0 % Final    Lymph% 11/03/2017 42.0  18.0 - 48.0 % Final    Mono% 11/03/2017 10.4  4.0 - 15.0 % Final    Eosinophil% 11/03/2017 2.9  0.0 - 8.0 % Final    Basophil% 11/03/2017 0.3  0.0 - 1.9 % Final    Differential Method 11/03/2017 Automated   Final    Sodium 11/03/2017 140  136 - 145 mmol/L Final    Potassium 11/03/2017 4.1  3.5 - 5.1 mmol/L Final    Chloride 11/03/2017 102  95 - 110 mmol/L Final    CO2 11/03/2017 30* 23 - 29 mmol/L Final    Glucose 11/03/2017 86  70 - 110 mg/dL Final    BUN, Bld 11/03/2017 31* 8 - 23 mg/dL Final    Creatinine 11/03/2017 2.1* 0.5 - 1.4 mg/dL Final    Calcium 11/03/2017 9.4  8.7 - 10.5 mg/dL Final    Total Protein 11/03/2017 8.6* 6.0 - 8.4 g/dL Final    Albumin 11/03/2017 3.3* 3.5 - 5.2 g/dL Final    Total Bilirubin 11/03/2017 0.6  0.1 - 1.0 mg/dL Final    Alkaline Phosphatase 11/03/2017 108  55 - 135 U/L Final    AST 11/03/2017 34  10 - 40 U/L Final    ALT 11/03/2017 22  10 - 44 U/L Final    Anion Gap 11/03/2017 8  8 - 16 mmol/L Final    eGFR if  11/03/2017 28.3* >60 mL/min/1.73 m^2 Final    eGFR if non African American 11/03/2017 24.5* >60 mL/min/1.73 m^2 Final    C1Q1 Testing Date 11/09/2017 11/09/2017 09:57 AM   Final    C1Q1 Testing Date 11/09/2017 11/09/2017 12:00 AM   Final    Serum Collection DT - [facility] Clas* 11/09/2017 11/03/2017 10:25 AM   Final    Class I Antibody Comments - [facility] 11/09/2017 NO DSA DETECTED   Final    C1Q2  Testing Date 11/09/2017 11/09/2017 12:00 AM   Final    Serum Collection DT - [facility] Clas* 11/09/2017 11/03/2017 10:25 AM   Final    Class II Antibody Comments - [facility]* 11/09/2017 NO DSA DETECTED   Final    DSA12 Testing Date 11/06/2017 11/06/2017 02:27 PM   Final    DSA1 Testing Date 11/06/2017 11/06/2017 12:00 AM   Final    Serum Collection DT - [facility] Clas* 11/06/2017 11/03/2017 10:25 AM   Final    Class I Antibody Comments - [facility] 11/06/2017 NO DSA DETECTED   Final    DSA2 Testing Date 11/06/2017 11/06/2017 12:00 AM   Final    Serum Collection DT - [facility] Clas* 11/06/2017 11/03/2017 10:25 AM   Final    Class II Antibodies - [facility] 11/06/2017 DQ7   Final    Class II Antibody Comments - [facility]* 11/06/2017 DSA DETECTED: [id number])-- UNABLE TO DETERMINE IF DQA ANTIBODIES ARE DSA   Final    Cholesterol 11/03/2017 172  120 - 199 mg/dL Final    Triglycerides 11/03/2017 139  30 - 150 mg/dL Final    HDL 11/03/2017 48  40 - 75 mg/dL Final    LDL Cholesterol 11/03/2017 96.2  63.0 - 159.0 mg/dL Final    HDL/Chol Ratio 11/03/2017 27.9  20.0 - 50.0 % Final    Total Cholesterol/HDL Ratio 11/03/2017 3.6  2.0 - 5.0 Final    Non-HDL Cholesterol 11/03/2017 124  mg/dL Final    Magnesium 11/03/2017 1.8  1.6 - 2.6 mg/dL Final    Cyclosporine, LC/MS 11/04/2017 113  100 - 400 ng/mL Final     Assessment:       1. Anxiety    2. Sore throat        Plan:   Strep ID.neg  Decadron IM fluids rest and call as needed   Refill Xanax.  Blood pressure 130/80 continue current medications      Anxiety  -     ALPRAZolam (XANAX) 1 MG tablet; Take 1 tablet (1 mg total) by mouth 2 (two) times daily.  Dispense: 60 tablet; Refill: 0    Sore throat  -     POCT Rapid Strep A

## 2017-12-15 ENCOUNTER — TELEPHONE (OUTPATIENT)
Dept: INTERNAL MEDICINE | Facility: CLINIC | Age: 62
End: 2017-12-15

## 2017-12-15 NOTE — TELEPHONE ENCOUNTER
Informed will call to find out about a PA on BP meds. Will check the fax to see if anything was received.

## 2017-12-15 NOTE — TELEPHONE ENCOUNTER
----- Message from Tanna Sibleyite sent at 12/14/2017  4:50 PM CST -----  Contact: Pt   Pt called and stated she needed to speak to the nurse. She stated that she needs the doctor to sign the prior auth for her to get her medication shipped from [facility]. She can be reached at [phone].    Thanks,  TF

## 2017-12-22 ENCOUNTER — HOSPITAL ENCOUNTER (OUTPATIENT)
Dept: RADIOLOGY | Facility: HOSPITAL | Age: 62
Discharge: HOME | End: 2017-12-22
Attending: OBSTETRICS & GYNECOLOGY
Payer: MEDICARE

## 2017-12-22 DIAGNOSIS — Z12.31 ENCOUNTER FOR SCREENING MAMMOGRAM FOR MALIGNANT NEOPLASM OF BREAST: ICD-10-CM

## 2017-12-22 PROCEDURE — 77067 SCR MAMMO BI INCL CAD: CPT | Mod: TC

## 2017-12-22 PROCEDURE — 77067 SCR MAMMO BI INCL CAD: CPT | Mod: 26,,, | Performed by: RADIOLOGY

## 2017-12-28 DIAGNOSIS — F41.9 ANXIETY: Chronic | ICD-10-CM

## 2017-12-29 RX ORDER — BUSPIRONE HYDROCHLORIDE 10 MG/1
TABLET ORAL
Qty: 90 TABLET | Refills: 3 | Status: SHIPPED | OUTPATIENT
Start: 2017-12-29 | End: 2018-01-23

## 2018-01-07 ENCOUNTER — HOSPITAL ENCOUNTER (EMERGENCY)
Facility: HOSPITAL | Age: 63
Discharge: HOME | End: 2018-01-07
Attending: EMERGENCY MEDICINE
Payer: MEDICARE

## 2018-01-07 VITALS
DIASTOLIC BLOOD PRESSURE: 85 MMHG | HEART RATE: 86 BPM | TEMPERATURE: 97 F | OXYGEN SATURATION: 98 % | RESPIRATION RATE: 10 BRPM | SYSTOLIC BLOOD PRESSURE: 148 MMHG

## 2018-01-07 DIAGNOSIS — R07.9 CHEST PAIN: ICD-10-CM

## 2018-01-07 DIAGNOSIS — F41.0 ANXIETY ATTACK: Primary | ICD-10-CM

## 2018-01-07 LAB
ALBUMIN SERPL BCP-MCNC: 3.3 G/DL
ALP SERPL-CCNC: 103 U/L
ALT SERPL W/O P-5'-P-CCNC: 27 U/L
ANION GAP SERPL CALC-SCNC: 15 MMOL/L
APTT BLDCRRT: 27.5 SEC
AST SERPL-CCNC: 48 U/L
BASOPHILS # BLD AUTO: 0.01 K/UL
BASOPHILS NFR BLD: 0.3 %
BILIRUB SERPL-MCNC: 0.7 MG/DL
BNP SERPL-MCNC: 136 PG/ML
BUN SERPL-MCNC: 34 MG/DL
CALCIUM SERPL-MCNC: 9.6 MG/DL
CHLORIDE SERPL-SCNC: 106 MMOL/L
CO2 SERPL-SCNC: 21 MMOL/L
CREAT SERPL-MCNC: 2.2 MG/DL
DIFFERENTIAL METHOD: ABNORMAL
EOSINOPHIL # BLD AUTO: 0.1 K/UL
EOSINOPHIL NFR BLD: 2 %
ERYTHROCYTE [DISTWIDTH] IN BLOOD BY AUTOMATED COUNT: 14.6 %
EST. GFR  (AFRICAN AMERICAN): 27 ML/MIN/1.73 M^2
EST. GFR  (NON AFRICAN AMERICAN): 23 ML/MIN/1.73 M^2
GLUCOSE SERPL-MCNC: 85 MG/DL
HCT VFR BLD AUTO: 30.8 %
HGB BLD-MCNC: 10.2 G/DL
INR PPP: 1
LYMPHOCYTES # BLD AUTO: 1.4 K/UL
LYMPHOCYTES NFR BLD: 41.1 %
MCH RBC QN AUTO: 28.6 PG
MCHC RBC AUTO-ENTMCNC: 33.1 G/DL
MCV RBC AUTO: 86 FL
MONOCYTES # BLD AUTO: 0.6 K/UL
MONOCYTES NFR BLD: 17.2 %
NEUTROPHILS # BLD AUTO: 1.4 K/UL
NEUTROPHILS NFR BLD: 39.4 %
PLATELET # BLD AUTO: 279 K/UL
PMV BLD AUTO: 9.3 FL
POTASSIUM SERPL-SCNC: 4.8 MMOL/L
PROT SERPL-MCNC: 8.8 G/DL
PROTHROMBIN TIME: 10.2 SEC
RBC # BLD AUTO: 3.57 M/UL
SODIUM SERPL-SCNC: 142 MMOL/L
TROPONIN I SERPL DL<=0.01 NG/ML-MCNC: 0.01 NG/ML
WBC # BLD AUTO: 3.48 K/UL

## 2018-01-07 PROCEDURE — 84484 ASSAY OF TROPONIN QUANT: CPT

## 2018-01-07 PROCEDURE — 93010 ELECTROCARDIOGRAM REPORT: CPT | Mod: ,,, | Performed by: INTERNAL MEDICINE

## 2018-01-07 PROCEDURE — 25000003 PHARM REV CODE 250: Performed by: EMERGENCY MEDICINE

## 2018-01-07 PROCEDURE — 85610 PROTHROMBIN TIME: CPT

## 2018-01-07 PROCEDURE — 85730 THROMBOPLASTIN TIME PARTIAL: CPT

## 2018-01-07 PROCEDURE — 93005 ELECTROCARDIOGRAM TRACING: CPT

## 2018-01-07 PROCEDURE — 85025 COMPLETE CBC W/AUTO DIFF WBC: CPT

## 2018-01-07 PROCEDURE — 99284 EMERGENCY DEPT VISIT MOD MDM: CPT | Mod: 25

## 2018-01-07 PROCEDURE — 83880 ASSAY OF NATRIURETIC PEPTIDE: CPT

## 2018-01-07 PROCEDURE — 80053 COMPREHEN METABOLIC PANEL: CPT

## 2018-01-07 RX ORDER — ALPRAZOLAM 0.5 MG/1
1 TABLET ORAL
Status: COMPLETED | OUTPATIENT
Start: 2018-01-07 | End: 2018-01-07

## 2018-01-07 RX ORDER — ASPIRIN 325 MG
325 TABLET ORAL
Status: COMPLETED | OUTPATIENT
Start: 2018-01-07 | End: 2018-01-07

## 2018-01-07 RX ADMIN — ALPRAZOLAM 1 MG: 0.5 TABLET ORAL at 02:01

## 2018-01-07 RX ADMIN — ASPIRIN 325 MG ORAL TABLET 325 MG: 325 PILL ORAL at 02:01

## 2018-01-07 NOTE — ED PROVIDER NOTES
SCRIBE #1 NOTE: I, Ariela Marks, am scribing for, and in the presence of, Leana Deshpande MD. I have scribed the entire note.      History      Chief Complaint   Patient presents with    Chest Pain     chest pain, SOB, tremors to right arm       Review of patient's allergies indicates:   Allergen Reactions    Lisinopril Swelling and Rash    Atorvastatin Swelling    Hydrocodone Nausea And Vomiting        HPI   HPI    1/7/2018, 2:12 PM   History obtained from the patient   HPI limited, pt is a poor historian and uncooperative       History of Present Illness: Nadia Damon is a 62 y.o. female patient who presents to the Emergency Department for CP which onset gradually today. Pt is refusing to describe the pain and will not state what time the pain started or what she was doing when the pain started. Symptoms are constant and moderate in severity. No mitigating or exacerbating factors reported. Associated sxs include anxiety. Pt states she has anxiety but did not take her anxiety medications today. No further complaints or concerns at this time.       Arrival mode: Personal vehicle    PCP: Richie Cassidy MD       Past Medical History:  Past Medical History:   Diagnosis Date    Anxiety     Arthritis     Chronic kidney disease     Coronary artery disease     Depression     Fibromyalgia     on Lyrica    Heart failure     native heart cardiomyopathy    Heart transplanted 1993    due to cardiomyopathy    Hypertension     Immune disorder     anti rejection meds    Iron deficiency anemia 8/15/2017    Obesity     Shingles 2003 approx    left leg    Trouble in sleeping     Urinary incontinence        Past Surgical History:  Past Surgical History:   Procedure Laterality Date    BLADDER SURGERY  2015 approx    mesh - Dr Everett then 2nd reconstructive sx Dr Onofre    BREAST SURGERY Left 9/28/15    Bx - benign    BREAST SURGERY Right 12/2015    Bx benign    CARDIAC PACEMAKER REMOVAL Left 06/26/14     Pacer defirillator removed. Put in 1993 aat time of heart transplant    CARPAL TUNNEL RELEASE Left 03/03/15    Dr. Hall    HEART TRANSPLANT  1993    HERNIA REPAIR Right 1971 approx    Inguinal    HYSTERECTOMY  1983    vag hyst /LSO          Family History:  Family History   Problem Relation Age of Onset    Cancer Mother 38     breast    Breast cancer Mother     Heart disease Maternal Grandmother     Cataracts Cousin     Hypertension Son     Diabetes Neg Hx     Stroke Neg Hx     Kidney disease Neg Hx     Asthma Neg Hx     COPD Neg Hx     Melanoma Neg Hx        Social History:  Social History     Social History Main Topics    Smoking status: Never Smoker    Smokeless tobacco: Never Used    Alcohol use No    Drug use: No    Sexual activity: No       ROS   Review of Systems   Unable to perform ROS: Other   Cardiovascular: Positive for chest pain.   Pt uncooperative and a poor historian.    Physical Exam      Initial Vitals [01/07/18 1355]   BP Pulse Resp Temp SpO2   115/79 89 (!) 29 97.3 °F (36.3 °C) 100 %      MAP       91          Physical Exam  Nursing Notes and Vital Signs Reviewed.  Constitutional: Patient is in mild distress. Well-developed and well-nourished.  Head: Atraumatic. Normocephalic.  Eyes: PERRL. EOM intact. Conjunctivae are not pale. No scleral icterus.  ENT: Mucous membranes are moist. Oropharynx is clear and symmetric.    Neck: Supple. Full ROM. No lymphadenopathy.  Cardiovascular: Regular rate. Regular rhythm. No murmurs, rubs, or gallops. Distal pulses are 2+ and symmetric.  Pulmonary/Chest: Hyperventilating. Clear to auscultation bilaterally. No wheezing or rales.  Abdominal: Soft and non-distended.  There is no tenderness.   Musculoskeletal: Moves all extremities. No obvious deformities. No edema. No calf tenderness.  Skin: Warm and dry.  Neurological:  Alert, awake, and appropriate.  Normal speech.  No acute focal neurological deficits are appreciated.  Psychiatric:  Anxious affect. Good eye contact. Appropriate in content.    ED Course    Procedures  ED Vital Signs:  Vitals:    01/07/18 1355 01/07/18 1417 01/07/18 1430   BP: 115/79 122/85    Pulse: 89 90 90   Resp: (!) 29 (!) 29    Temp: 97.3 °F (36.3 °C)     TempSrc: Oral     SpO2: 100% 100%      Vitals:    01/07/18 1537   BP: (!) 148/85   Pulse: 86   Resp: 10   Temp:          Abnormal Lab Results:  Labs Reviewed   CBC W/ AUTO DIFFERENTIAL - Abnormal; Notable for the following:        Result Value    WBC 3.48 (*)     RBC 3.57 (*)     Hemoglobin 10.2 (*)     Hematocrit 30.8 (*)     RDW 14.6 (*)     Gran # 1.4 (*)     Mono% 17.2 (*)     All other components within normal limits   COMPREHENSIVE METABOLIC PANEL - Abnormal; Notable for the following:     CO2 21 (*)     BUN, Bld 34 (*)     Creatinine 2.2 (*)     Total Protein 8.8 (*)     Albumin 3.3 (*)     AST 48 (*)     eGFR if  27 (*)     eGFR if non  23 (*)     All other components within normal limits   B-TYPE NATRIURETIC PEPTIDE - Abnormal; Notable for the following:      (*)     All other components within normal limits   TROPONIN I   PROTIME-INR   APTT        All Lab Results:  Results for orders placed or performed during the hospital encounter of 01/07/18   CBC auto differential   Result Value Ref Range    WBC 3.48 (L) 3.90 - 12.70 K/uL    RBC 3.57 (L) 4.00 - 5.40 M/uL    Hemoglobin 10.2 (L) 12.0 - 16.0 g/dL    Hematocrit 30.8 (L) 37.0 - 48.5 %    MCV 86 82 - 98 fL    MCH 28.6 27.0 - 31.0 pg    MCHC 33.1 32.0 - 36.0 g/dL    RDW 14.6 (H) 11.5 - 14.5 %    Platelets 279 150 - 350 K/uL    MPV 9.3 9.2 - 12.9 fL    Gran # 1.4 (L) 1.8 - 7.7 K/uL    Lymph # 1.4 1.0 - 4.8 K/uL    Mono # 0.6 0.3 - 1.0 K/uL    Eos # 0.1 0.0 - 0.5 K/uL    Baso # 0.01 0.00 - 0.20 K/uL    Gran% 39.4 38.0 - 73.0 %    Lymph% 41.1 18.0 - 48.0 %    Mono% 17.2 (H) 4.0 - 15.0 %    Eosinophil% 2.0 0.0 - 8.0 %    Basophil% 0.3 0.0 - 1.9 %    Differential Method Automated     Comprehensive metabolic panel   Result Value Ref Range    Sodium 142 136 - 145 mmol/L    Potassium 4.8 3.5 - 5.1 mmol/L    Chloride 106 95 - 110 mmol/L    CO2 21 (L) 23 - 29 mmol/L    Glucose 85 70 - 110 mg/dL    BUN, Bld 34 (H) 8 - 23 mg/dL    Creatinine 2.2 (H) 0.5 - 1.4 mg/dL    Calcium 9.6 8.7 - 10.5 mg/dL    Total Protein 8.8 (H) 6.0 - 8.4 g/dL    Albumin 3.3 (L) 3.5 - 5.2 g/dL    Total Bilirubin 0.7 0.1 - 1.0 mg/dL    Alkaline Phosphatase 103 55 - 135 U/L    AST 48 (H) 10 - 40 U/L    ALT 27 10 - 44 U/L    Anion Gap 15 8 - 16 mmol/L    eGFR if African American 27 (A) >60 mL/min/1.73 m^2    eGFR if non African American 23 (A) >60 mL/min/1.73 m^2   Troponin I #1   Result Value Ref Range    Troponin I 0.006 0.000 - 0.026 ng/mL   B-Type natriuretic peptide (BNP)   Result Value Ref Range     (H) 0 - 99 pg/mL   Protime-INR   Result Value Ref Range    Prothrombin Time 10.2 9.0 - 12.5 sec    INR 1.0 0.8 - 1.2   APTT   Result Value Ref Range    aPTT 27.5 21.0 - 32.0 sec       Imaging Results:  Imaging Results          X-Ray Chest AP Portable (Final result)  Result time 01/07/18 14:34:39    Final result by Bruno Webber III, MD (01/07/18 14:34:39)                 Impression:     No acute abnormality suggested.      Electronically signed by: BRUNO WEBBER MD  Date:     01/07/18  Time:    14:34              Narrative:    One view chest x-ray.    Clinical indication: Chest pain    Heart size is normal. The lung fields are clear. Post changes noted along the sternum. No acute pulmonary infiltrate.                             The EKG was ordered, reviewed, and independently interpreted by the ED provider.  Interpretation time: 14:11  Rate: 91 BPM  Rhythm: normal sinus rhythm  Interpretation: LAD. RBBB. Minimal voltage criteria for LVH. T wave abnormality. No STEMI.  When compared to EKG performed 8/6/16, there are no significant changes.         The Emergency Provider reviewed the vital signs and test  results, which are outlined above.    ED Discussion     3:26 PM: Reassessed pt at this time. Pt is now communicating and states she has not been taking her Xanax as prescribed. Pt is prescribed Xanax 1mg BID. Pt states her condition has completely improved at this time and is not complaining of any sxs. Pt denies CP, SOB, palpitations, diaphoresis, n/v, extremity weakness/numbness, dizziness, BLE edema/pain, and all other sxs at this time. Discussed with pt all pertinent ED information and results. Discussed pt dx and plan of tx. Gave pt all f/u and return to the ED instructions. All questions and concerns were addressed at this time. Pt expresses understanding of information and instructions, and is comfortable with plan to discharge. Pt is stable for discharge.    I have discussed with patient and/or family/caretaker chest pain precautions, specifically to return for worsening chest pain, shortness of breath, fever, or any concern.  I have low suspicion for cardiopulmonary, vascular, infectious, respiratory, or other emergent medical condition based on my evaluation in the ED.    ED Medication(s):  Medications   aspirin tablet 325 mg (325 mg Oral Given 1/7/18 1433)   ALPRAZolam tablet 1 mg (1 mg Oral Given 1/7/18 1433)       New Prescriptions    No medications on file       Follow-up Information     Richie Cassidy MD. Schedule an appointment as soon as possible for a visit in 1 day.    Specialty:  Family Medicine  Why:  Return to the Emergency Room, If symptoms worsen  Contact information:  [place] [id number]  [phone]                     Medical Decision Making    Medical Decision Making:   Clinical Tests:   Lab Tests: Ordered and Reviewed  Radiological Study: Reviewed and Ordered  Medical Tests: Reviewed and Ordered           Scribe Attestation:   Scribe #1: I performed the above scribed service and the documentation accurately describes the services I performed. I attest to the accuracy  of the note.    Attending:   Physician Attestation Statement for Scribe #1: I, Leana Deshpande MD, personally performed the services described in this documentation, as scribed by Ariela Marks, in my presence, and it is both accurate and complete.          Clinical Impression       ICD-10-CM ICD-9-CM   1. Anxiety attack F41.0 300.01   2. Chest pain R07.9 786.50       Disposition:   Disposition: Discharged  Condition: Stable         Leana Deshpande MD  01/07/18 1931

## 2018-01-08 ENCOUNTER — TELEPHONE (OUTPATIENT)
Dept: TRANSPLANT | Facility: CLINIC | Age: 63
End: 2018-01-08

## 2018-01-12 ENCOUNTER — OFFICE VISIT (OUTPATIENT)
Dept: INTERNAL MEDICINE | Facility: CLINIC | Age: 63
End: 2018-01-12
Payer: MEDICARE

## 2018-01-12 ENCOUNTER — NURSE TRIAGE (OUTPATIENT)
Dept: ADMINISTRATIVE | Facility: CLINIC | Age: 63
End: 2018-01-12

## 2018-01-12 VITALS
BODY MASS INDEX: 26.37 KG/M2 | OXYGEN SATURATION: 97 % | TEMPERATURE: 98 F | DIASTOLIC BLOOD PRESSURE: 86 MMHG | SYSTOLIC BLOOD PRESSURE: 130 MMHG | HEART RATE: 96 BPM | HEIGHT: 62 IN | WEIGHT: 143.31 LBS

## 2018-01-12 DIAGNOSIS — D84.9 IMMUNOCOMPROMISED PATIENT: ICD-10-CM

## 2018-01-12 DIAGNOSIS — F41.9 ANXIETY: Chronic | ICD-10-CM

## 2018-01-12 DIAGNOSIS — F41.9 ANXIETY: ICD-10-CM

## 2018-01-12 DIAGNOSIS — Z94.1 HEART TRANSPLANTED: Primary | Chronic | ICD-10-CM

## 2018-01-12 DIAGNOSIS — H92.01 EAR PAIN, RIGHT: ICD-10-CM

## 2018-01-12 DIAGNOSIS — J06.9 UPPER RESPIRATORY INFECTION WITH COUGH AND CONGESTION: ICD-10-CM

## 2018-01-12 DIAGNOSIS — I10 ESSENTIAL HYPERTENSION: ICD-10-CM

## 2018-01-12 DIAGNOSIS — F33.9 CHRONIC MAJOR DEPRESSIVE DISORDER, RECURRENT EPISODE: ICD-10-CM

## 2018-01-12 LAB
CTP QC/QA: YES
FLUAV AG NPH QL: NEGATIVE
FLUBV AG NPH QL: NEGATIVE

## 2018-01-12 PROCEDURE — 99214 OFFICE O/P EST MOD 30 MIN: CPT | Mod: 25,S$GLB,, | Performed by: FAMILY MEDICINE

## 2018-01-12 PROCEDURE — 99999 PR PBB SHADOW E&M-EST. PATIENT-LVL V: CPT | Mod: PBBFAC,,, | Performed by: FAMILY MEDICINE

## 2018-01-12 PROCEDURE — 87804 INFLUENZA ASSAY W/OPTIC: CPT | Mod: 51,QW,S$GLB, | Performed by: FAMILY MEDICINE

## 2018-01-12 PROCEDURE — 99499 UNLISTED E&M SERVICE: CPT | Mod: S$GLB,,, | Performed by: FAMILY MEDICINE

## 2018-01-12 RX ORDER — GUAIFENESIN/DEXTROMETHORPHAN 100-10MG/5
5 SYRUP ORAL 2 TIMES DAILY PRN
Refills: 0 | COMMUNITY
Start: 2018-01-12 | End: 2018-01-12 | Stop reason: SDUPTHER

## 2018-01-12 RX ORDER — GUAIFENESIN/DEXTROMETHORPHAN 100-10MG/5
5 SYRUP ORAL 2 TIMES DAILY PRN
Refills: 0 | COMMUNITY
Start: 2018-01-12 | End: 2018-01-23

## 2018-01-12 RX ORDER — ALPRAZOLAM 1 MG/1
1 TABLET ORAL 2 TIMES DAILY
Qty: 42 TABLET | Refills: 0 | Status: SHIPPED | OUTPATIENT
Start: 2018-01-12 | End: 2018-01-23

## 2018-01-12 RX ORDER — AZITHROMYCIN 500 MG/1
500 TABLET, FILM COATED ORAL DAILY
Qty: 3 TABLET | Refills: 0 | Status: SHIPPED | OUTPATIENT
Start: 2018-01-12 | End: 2018-01-15

## 2018-01-12 NOTE — ASSESSMENT & PLAN NOTE
Needs refill. Discussed addiction potential of meds. Advised to start tapering if possible starting in 2 weeks try taking one tab QHS

## 2018-01-12 NOTE — TELEPHONE ENCOUNTER
"Maribel from [facility] pharmacy stated that the patient is looking for cough medication. Informed her that the cough medication is OTC and stated that she would let the patient know    Reason for Disposition   Pharmacy calling with prescription question and triager answers question    Answer Assessment - Initial Assessment Questions  1. REASON FOR CALL or QUESTION: "What is your reason for calling today?" or "How can I best help you?" or "What question do you have that I can help answer?"    Pharmacy stated that the patient is looking for cough medication    Protocols used: ST MEDICATION QUESTION CALL-A-AH, ST INFORMATION ONLY CALL-A-AH      "
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
## 2018-01-13 DIAGNOSIS — M62.838 MUSCLE SPASMS OF BOTH LOWER EXTREMITIES: ICD-10-CM

## 2018-01-14 RX ORDER — BACLOFEN 10 MG/1
TABLET ORAL
Qty: 30 TABLET | Refills: 5 | Status: SHIPPED | OUTPATIENT
Start: 2018-01-14 | End: 2018-04-13 | Stop reason: SDUPTHER

## 2018-01-16 ENCOUNTER — TELEPHONE (OUTPATIENT)
Dept: INTERNAL MEDICINE | Facility: CLINIC | Age: 63
End: 2018-01-16

## 2018-01-18 ENCOUNTER — TELEPHONE (OUTPATIENT)
Dept: INTERNAL MEDICINE | Facility: CLINIC | Age: 63
End: 2018-01-18

## 2018-01-18 NOTE — TELEPHONE ENCOUNTER
Contacted pt. Reports feeling better. Not leaning to right as much as before. However, she did fall on her knees as she got up too quickly to answer the door. Advised pt to come in next week. Will route so she can be scheduled

## 2018-01-19 DIAGNOSIS — F51.01 PRIMARY INSOMNIA: ICD-10-CM

## 2018-01-19 PROBLEM — J02.9 SORE THROAT: Status: RESOLVED | Noted: 2017-12-12 | Resolved: 2018-01-19

## 2018-01-22 RX ORDER — ZOLPIDEM TARTRATE 10 MG/1
TABLET ORAL
Qty: 30 TABLET | Refills: 5 | Status: SHIPPED | OUTPATIENT
Start: 2018-01-22 | End: 2018-01-23 | Stop reason: SDUPTHER

## 2018-01-23 ENCOUNTER — TELEPHONE (OUTPATIENT)
Dept: INTERNAL MEDICINE | Facility: CLINIC | Age: 63
End: 2018-01-23

## 2018-01-23 ENCOUNTER — OFFICE VISIT (OUTPATIENT)
Dept: INTERNAL MEDICINE | Facility: CLINIC | Age: 63
End: 2018-01-23
Payer: MEDICARE

## 2018-01-23 VITALS
HEART RATE: 102 BPM | BODY MASS INDEX: 26.05 KG/M2 | DIASTOLIC BLOOD PRESSURE: 81 MMHG | TEMPERATURE: 98 F | HEIGHT: 62 IN | WEIGHT: 141.56 LBS | SYSTOLIC BLOOD PRESSURE: 117 MMHG | OXYGEN SATURATION: 97 %

## 2018-01-23 DIAGNOSIS — Z79.899 ENCOUNTER FOR MEDICATION MANAGEMENT: Primary | ICD-10-CM

## 2018-01-23 DIAGNOSIS — F51.01 PRIMARY INSOMNIA: ICD-10-CM

## 2018-01-23 DIAGNOSIS — M79.7 FIBROMYALGIA: ICD-10-CM

## 2018-01-23 DIAGNOSIS — Z91.81 HX OF FALLING: ICD-10-CM

## 2018-01-23 PROCEDURE — 99999 PR PBB SHADOW E&M-EST. PATIENT-LVL III: CPT | Mod: PBBFAC,,, | Performed by: FAMILY MEDICINE

## 2018-01-23 PROCEDURE — 99213 OFFICE O/P EST LOW 20 MIN: CPT | Mod: S$GLB,,, | Performed by: FAMILY MEDICINE

## 2018-01-23 RX ORDER — ACETAMINOPHEN AND PHENYLEPHRINE HCL 325; 5 MG/1; MG/1
1 TABLET ORAL DAILY
COMMUNITY

## 2018-01-23 RX ORDER — ZOLPIDEM TARTRATE 10 MG/1
TABLET ORAL
Qty: 30 TABLET | Refills: 5 | Status: SHIPPED | OUTPATIENT
Start: 2018-01-23 | End: 2018-04-04 | Stop reason: SDUPTHER

## 2018-01-23 RX ORDER — TEMAZEPAM 30 MG/1
30 CAPSULE ORAL NIGHTLY PRN
Qty: 30 CAPSULE | Refills: 5 | Status: SHIPPED | OUTPATIENT
Start: 2018-01-23 | End: 2018-02-19 | Stop reason: SDUPTHER

## 2018-01-23 NOTE — TELEPHONE ENCOUNTER
----- Message from Kathy Calloway sent at 1/23/2018 11:56 AM CST -----  Contact: pt  Pt is at [facility] pharmacy on [address] and waiting on Rx to be sent to the pharmacy and it has not been sent yet and please advise [phone] (home)

## 2018-01-23 NOTE — PROGRESS NOTES
"Subjective:       Patient ID: Nadia Damon is a 62 y.o. female.    Chief Complaint: Fall and Dizziness    HPI  Here for follow up of falling and cough, congestion. Still not driving. Not leaning to one side anymore    Cough congestion improved with OTC tussin.     Reports falling bc stood up too quickly to answer door on Tuesday. Did not trip. Landed on right knee. Hurts when turns leg exernally    Needs refill of ambien. Has been on this since 1993 since transplant. Also needs to refill temazepam for insomnia but has refills until February    Review of Systems   Constitutional: Negative for activity change (walking), fatigue and fever.   HENT: Negative for congestion, sinus pressure and sore throat.    Eyes: Negative for discharge (watery d/t allergies) and visual disturbance.   Respiratory: Positive for cough. Negative for shortness of breath.    Gastrointestinal: Negative for abdominal pain, blood in stool, constipation, nausea and vomiting.   Genitourinary: Negative for difficulty urinating, dysuria and hematuria.   Musculoskeletal: Positive for joint swelling (right knee after falling).   Skin: Negative for rash and wound.   Allergic/Immunologic: Positive for environmental allergies.   Neurological: Negative for dizziness and headaches.        Objective:   /81 (BP Location: Right arm, Patient Position: Sitting, BP Method: Medium (Automatic))   Pulse 102   Temp 97.7 °F (36.5 °C) (Oral)   Ht 5' 2" (1.575 m)   Wt 64.2 kg (141 lb 8.6 oz)   LMP 06/20/1983 (Within Years)   SpO2 97%   BMI 25.89 kg/m²     Physical Exam   Constitutional: She is oriented to person, place, and time. She appears well-nourished. No distress.   HENT:   Head: Normocephalic and atraumatic.   Nose: Mucosal edema and rhinorrhea present. No epistaxis.   Mouth/Throat: Oropharynx is clear and moist.   Eyes: Conjunctivae and EOM are normal. No scleral icterus.   Neck: Normal range of motion. Neck supple.   Cardiovascular: Normal " rate, regular rhythm and normal heart sounds.    B/l swelling nonpitting LE   Pulmonary/Chest: Effort normal and breath sounds normal. She has no wheezes.   Abdominal: Soft. Bowel sounds are normal. There is no tenderness.   Musculoskeletal: She exhibits edema (mild swelling of right knee) and tenderness (anterior right knee joint line). She exhibits no deformity.   Neurological: She is alert and oriented to person, place, and time.   Skin: Skin is warm and dry. No abrasion, no bruising, no ecchymosis and no rash noted. No erythema.        Psychiatric: She has a normal mood and affect. Her behavior is normal.     Assessment:     1. Encounter for medication management    2. Primary insomnia    3. Fibromyalgia    4. Hx of falling      Plan:     Problem List Items Addressed This Visit        Orthopedic    Fibromyalgia       Other    Insomnia    Relevant Medications    zolpidem (AMBIEN) 10 mg Tab    temazepam (RESTORIL) 30 mg capsule    Hx of falling      Other Visit Diagnoses     Encounter for medication management    -  Primary      Continue sx tx for knee pain. Ambulating well. No need for imaging at this time.     Reviewed all medications, including OTC supplements. Pt aware of meds and indications for each. Has pill box and fills this herself    Follow-up in about 3 months (around 4/23/2018).

## 2018-01-24 ENCOUNTER — TELEPHONE (OUTPATIENT)
Dept: INTERNAL MEDICINE | Facility: CLINIC | Age: 63
End: 2018-01-24

## 2018-01-24 NOTE — TELEPHONE ENCOUNTER
pls check whether pt received ambien and temazepam since these were printed rather than e-prescribed

## 2018-01-26 ENCOUNTER — OFFICE VISIT (OUTPATIENT)
Dept: INTERNAL MEDICINE | Facility: CLINIC | Age: 63
End: 2018-01-26
Payer: MEDICARE

## 2018-01-26 VITALS — HEART RATE: 113 BPM | DIASTOLIC BLOOD PRESSURE: 86 MMHG | TEMPERATURE: 98 F | SYSTOLIC BLOOD PRESSURE: 143 MMHG

## 2018-01-26 DIAGNOSIS — F13.939 BENZODIAZEPINE WITHDRAWAL WITH COMPLICATION: ICD-10-CM

## 2018-01-26 DIAGNOSIS — R11.10 VOMITING, INTRACTABILITY OF VOMITING NOT SPECIFIED, PRESENCE OF NAUSEA NOT SPECIFIED, UNSPECIFIED VOMITING TYPE: Primary | ICD-10-CM

## 2018-01-26 PROBLEM — Z12.39 BREAST SCREENING: Status: RESOLVED | Noted: 2017-10-16 | Resolved: 2018-01-26

## 2018-01-26 PROBLEM — Z28.39 IMMUNIZATION DEFICIENCY: Status: RESOLVED | Noted: 2017-03-02 | Resolved: 2018-01-26

## 2018-01-26 PROCEDURE — 99999 PR PBB SHADOW E&M-EST. PATIENT-LVL II: CPT | Mod: PBBFAC,,, | Performed by: FAMILY MEDICINE

## 2018-01-26 PROCEDURE — 96372 THER/PROPH/DIAG INJ SC/IM: CPT | Mod: S$GLB,,, | Performed by: FAMILY MEDICINE

## 2018-01-26 PROCEDURE — 99213 OFFICE O/P EST LOW 20 MIN: CPT | Mod: 25,S$GLB,, | Performed by: FAMILY MEDICINE

## 2018-01-26 PROCEDURE — 99499 UNLISTED E&M SERVICE: CPT | Mod: S$GLB,,, | Performed by: FAMILY MEDICINE

## 2018-01-26 RX ORDER — ONDANSETRON 4 MG/1
4 TABLET, ORALLY DISINTEGRATING ORAL EVERY 6 HOURS PRN
Qty: 16 TABLET | Refills: 0 | Status: SHIPPED | OUTPATIENT
Start: 2018-01-26 | End: 2018-01-30

## 2018-01-26 RX ORDER — ALPRAZOLAM 0.5 MG/1
0.5 TABLET ORAL 3 TIMES DAILY PRN
Qty: 63 TABLET | Refills: 0 | Status: SHIPPED | OUTPATIENT
Start: 2018-01-26 | End: 2018-04-13 | Stop reason: SDUPTHER

## 2018-01-26 RX ORDER — PROMETHAZINE HYDROCHLORIDE 25 MG/ML
25 INJECTION, SOLUTION INTRAMUSCULAR; INTRAVENOUS
Status: DISCONTINUED | OUTPATIENT
Start: 2018-01-26 | End: 2018-01-26

## 2018-01-26 RX ORDER — PROMETHAZINE HYDROCHLORIDE 25 MG/ML
12.5 INJECTION, SOLUTION INTRAMUSCULAR; INTRAVENOUS
Status: COMPLETED | OUTPATIENT
Start: 2018-01-26 | End: 2018-01-26

## 2018-01-26 RX ORDER — ALPRAZOLAM 0.5 MG/1
0.5 TABLET ORAL 3 TIMES DAILY PRN
Qty: 90 TABLET | Refills: 0 | Status: SHIPPED | OUTPATIENT
Start: 2018-01-26 | End: 2018-01-26 | Stop reason: CLARIF

## 2018-01-26 RX ADMIN — PROMETHAZINE HYDROCHLORIDE 12.5 MG: 25 INJECTION, SOLUTION INTRAMUSCULAR; INTRAVENOUS at 02:01

## 2018-01-26 NOTE — PROGRESS NOTES
Subjective:       Patient ID: Nadia Damon is a 62 y.o. female.    Chief Complaint: Emesis    HPI  Here for vomiting since last night. Thinks something at w/ lettuce and [place]. Started to vomit afterwards  Of note, has not been able to take restoril, awaiting mail order receipt. States also has not been able to take cyclosporine d/t vomiting.    Not driving. Friend present and offering HPI  Review of Systems   Gastrointestinal: Positive for nausea and vomiting. Negative for constipation and diarrhea.   Psychiatric/Behavioral: Positive for confusion.        Objective:   BP (!) 143/86 (BP Location: Left arm)   Pulse (!) 113   Temp 97.7 °F (36.5 °C)   LMP 06/20/1983 (Within Years)     Physical Exam   Constitutional: She is oriented to person, place, and time. She appears distressed.   HENT:   Head: Normocephalic and atraumatic.   Cardiovascular: Tachycardia present.    Pulmonary/Chest: Effort normal. No respiratory distress.   Abdominal: Soft.   Actively vomiting   Neurological: She is alert and oriented to person, place, and time.   Psychiatric: Her behavior is normal.   Vitals reviewed.    Assessment:     1. Vomiting, intractability of vomiting not specified, presence of nausea not specified, unspecified vomiting type    2. Benzodiazepine withdrawal with complication      Plan:     Problem List Items Addressed This Visit     None      Visit Diagnoses     Vomiting, intractability of vomiting not specified, presence of nausea not specified, unspecified vomiting type    -  Primary    Relevant Medications    ondansetron (ZOFRAN-ODT) 4 MG TbDL    promethazine injection 12.5 mg (Completed) (Start on 1/26/2018  3:00 PM)    Benzodiazepine withdrawal with complication        providing tapering dose of xanax as pt has not received mail order for restoril    Relevant Medications    ALPRAZolam (XANAX) 0.5 MG tablet      Advised that someone should stay w/ pt tonight    Follow-up in about 1 week (around 2/2/2018), or if  symptoms worsen or fail to improve.

## 2018-01-29 ENCOUNTER — TELEPHONE (OUTPATIENT)
Dept: INTERNAL MEDICINE | Facility: CLINIC | Age: 63
End: 2018-01-29

## 2018-01-29 NOTE — TELEPHONE ENCOUNTER
----- Message from Rhianna Burger sent at 1/29/2018 10:03 AM CST -----  Patient states that she was given some Phenergan on Friday for nauseau and would like some called into [facility] on the [address] of [facility] and [facility]..  Call her at [phone].                                                                   [email or web address]

## 2018-01-29 NOTE — TELEPHONE ENCOUNTER
Called and spoke with the patient, explained that the zofran will do the same thing as phenergan.  The patient will  the script to keep on hand.  The patient stated that she is no longer vomiting or nausea.

## 2018-01-31 ENCOUNTER — OFFICE VISIT (OUTPATIENT)
Dept: DERMATOLOGY | Facility: CLINIC | Age: 63
End: 2018-01-31
Payer: MEDICARE

## 2018-01-31 DIAGNOSIS — L65.8 TRACTION ALOPECIA: Primary | ICD-10-CM

## 2018-01-31 PROCEDURE — 3008F BODY MASS INDEX DOCD: CPT | Mod: S$GLB,,, | Performed by: DERMATOLOGY

## 2018-01-31 PROCEDURE — 99999 PR PBB SHADOW E&M-EST. PATIENT-LVL II: CPT | Mod: PBBFAC,,, | Performed by: DERMATOLOGY

## 2018-01-31 PROCEDURE — 11900 INJECT SKIN LESIONS </W 7: CPT | Mod: S$GLB,,, | Performed by: DERMATOLOGY

## 2018-01-31 PROCEDURE — 99213 OFFICE O/P EST LOW 20 MIN: CPT | Mod: 25,S$GLB,, | Performed by: DERMATOLOGY

## 2018-01-31 NOTE — PROGRESS NOTES
Subjective:       Patient ID:  Nadia Damon is a 62 y.o. female who presents for   Chief Complaint   Patient presents with    Hair Loss     f/u     Hx of traction alopecia, last seen on 11/3/17.  She is s/p ILK #1 at last visit.  She has been on biotin 10,000 mcg daily, mometasone lotion qD.  + improvement with increased hair growth.      She has avoided chemical relaxers x 3 months.     Use chemical relaxers once/month x 50 years.  She also uses hair dye.  Has tried low traction hair styles.         Review of Systems   Constitutional: Negative for fever and chills.   Gastrointestinal: Negative for nausea and vomiting.   Skin: Negative for daily sunscreen use, activity-related sunscreen use and recent sunburn.   Hematologic/Lymphatic: Does not bruise/bleed easily.        Objective:    Physical Exam   Constitutional: She appears well-developed and well-nourished. No distress.   Neurological: She is alert and oriented to person, place, and time. She is not disoriented.   Psychiatric: She has a normal mood and affect.   Skin:   Areas Examined (abnormalities noted in diagram):   Scalp / Hair Palpated and Inspected  Head / Face Inspection Performed  Neck Inspection Performed  RUE Inspected  LUE Inspection Performed  Nails and Digits Inspection Performed               Assessment / Plan:        Traction alopecia  -     triamcinolone acetonide injection 5 mg; 0.5 mLs (5 mg total) by Other route one time.  -     Continue biotin and mometasone lotion qD.  ILK #2 done today. After risks, benefits and alternatives explained and side effect profile reviewed, including hypopigmentation, telangiectasia and atrophy, the patient verbally consented to ILK injection. A total of 2 cc of kenalog 5 mg/ml was injected into the areas of hair loss of the frontal and temporal scalp.  Pt tolerated well without side effects.                 Follow-up in about 3 months (around 4/30/2018).

## 2018-02-07 DIAGNOSIS — M79.7 FIBROMYALGIA: ICD-10-CM

## 2018-02-07 RX ORDER — DULOXETIN HYDROCHLORIDE 30 MG/1
CAPSULE, DELAYED RELEASE ORAL
Qty: 90 CAPSULE | Refills: 1 | Status: SHIPPED | OUTPATIENT
Start: 2018-02-07 | End: 2018-07-31 | Stop reason: SDUPTHER

## 2018-02-15 ENCOUNTER — OFFICE VISIT (OUTPATIENT)
Dept: INTERNAL MEDICINE | Facility: CLINIC | Age: 63
End: 2018-02-15
Payer: MEDICARE

## 2018-02-15 VITALS
HEART RATE: 94 BPM | WEIGHT: 139.31 LBS | HEIGHT: 62 IN | SYSTOLIC BLOOD PRESSURE: 113 MMHG | OXYGEN SATURATION: 95 % | TEMPERATURE: 98 F | BODY MASS INDEX: 25.64 KG/M2 | DIASTOLIC BLOOD PRESSURE: 7 MMHG

## 2018-02-15 DIAGNOSIS — I10 ESSENTIAL HYPERTENSION: ICD-10-CM

## 2018-02-15 DIAGNOSIS — R11.10 VOMITING, INTRACTABILITY OF VOMITING NOT SPECIFIED, PRESENCE OF NAUSEA NOT SPECIFIED, UNSPECIFIED VOMITING TYPE: Primary | ICD-10-CM

## 2018-02-15 DIAGNOSIS — N18.4 CKD (CHRONIC KIDNEY DISEASE) STAGE 4, GFR 15-29 ML/MIN: ICD-10-CM

## 2018-02-15 PROCEDURE — 99213 OFFICE O/P EST LOW 20 MIN: CPT | Mod: S$GLB,,, | Performed by: FAMILY MEDICINE

## 2018-02-15 PROCEDURE — 99499 UNLISTED E&M SERVICE: CPT | Mod: S$GLB,,, | Performed by: FAMILY MEDICINE

## 2018-02-15 PROCEDURE — 3008F BODY MASS INDEX DOCD: CPT | Mod: S$GLB,,, | Performed by: FAMILY MEDICINE

## 2018-02-15 PROCEDURE — 99999 PR PBB SHADOW E&M-EST. PATIENT-LVL III: CPT | Mod: PBBFAC,,, | Performed by: FAMILY MEDICINE

## 2018-02-15 NOTE — PROGRESS NOTES
Subjective:       Patient ID: Nadia Damon is a 62 y.o. female.    Chief Complaint: Follow-up    Follow-up for vertigo and vomiting.  She reports she feels well now.  Symptoms resolved.  Medical history includes hypertension chronic kidney disease heart transplant.  She denies any headache chest pain palpitations or shortness of breath      Review of Systems   Constitutional: Negative for appetite change, fatigue and unexpected weight change.   Respiratory: Negative for cough, shortness of breath and wheezing.    Cardiovascular: Negative for chest pain, palpitations and leg swelling.   Gastrointestinal: Negative for abdominal pain.   Genitourinary: Negative for difficulty urinating.   Neurological: Negative for headaches.   Psychiatric/Behavioral: The patient is nervous/anxious.         She is using Xanax 3 times a day.  Discussed decreasing  when necessary and eventually stopping       Objective:      Physical Exam   Constitutional: She is oriented to person, place, and time. She appears well-developed and well-nourished. No distress.   Neck: Neck supple. No thyromegaly present.   Cardiovascular: Normal rate, regular rhythm and normal heart sounds.    No murmur heard.  Pulmonary/Chest: Effort normal and breath sounds normal. No respiratory distress. She has no wheezes.   Abdominal: Soft. Bowel sounds are normal. She exhibits no mass. There is no tenderness.   Lymphadenopathy:     She has no cervical adenopathy.   Neurological: She is alert and oriented to person, place, and time.       Office Visit on 01/12/2018   Component Date Value Ref Range Status    Rapid Influenza A Ag 01/12/2018 Negative  Negative Final    Rapid Influenza B Ag 01/12/2018 Negative  Negative Final     Acceptable 01/12/2018 Yes   Final     Assessment:       1. CKD (chronic kidney disease) stage 4, GFR 15-29 ml/min    2. Essential hypertension        Plan:     Blood pressure control.  Will continue current medications  decrease Xanax follow-up in 3 months    CKD (chronic kidney disease) stage 4, GFR 15-29 ml/min    Essential hypertension

## 2018-02-19 DIAGNOSIS — F51.01 PRIMARY INSOMNIA: ICD-10-CM

## 2018-02-19 RX ORDER — OMEPRAZOLE 10 MG/1
CAPSULE, DELAYED RELEASE ORAL
Qty: 90 CAPSULE | Refills: 3 | Status: SHIPPED | OUTPATIENT
Start: 2018-02-19 | End: 2019-02-12 | Stop reason: SDUPTHER

## 2018-02-19 RX ORDER — TEMAZEPAM 30 MG/1
CAPSULE ORAL
Qty: 90 CAPSULE | Refills: 1 | Status: SHIPPED | OUTPATIENT
Start: 2018-02-19 | End: 2018-04-13 | Stop reason: SDUPTHER

## 2018-02-26 ENCOUNTER — TELEPHONE (OUTPATIENT)
Dept: INTERNAL MEDICINE | Facility: CLINIC | Age: 63
End: 2018-02-26

## 2018-02-26 NOTE — TELEPHONE ENCOUNTER
----- Message from Perri Hess sent at 2/26/2018  1:17 PM CST -----  Contact: Pt   States she's calling rg wanting to know if the fax was received from [facility] morris pt's restireal(sp) and can be reached at [phone]//thanks/dbw

## 2018-03-21 ENCOUNTER — TELEPHONE (OUTPATIENT)
Dept: TRANSPLANT | Facility: CLINIC | Age: 63
End: 2018-03-21

## 2018-03-21 DIAGNOSIS — I10 ESSENTIAL HYPERTENSION: ICD-10-CM

## 2018-03-21 DIAGNOSIS — Z94.1 STATUS POST HEART TRANSPLANT: ICD-10-CM

## 2018-03-21 DIAGNOSIS — T86.20 COMPLICATION OF HEART TRANSPLANT, UNSPECIFIED COMPLICATION: ICD-10-CM

## 2018-03-21 DIAGNOSIS — E78.2 MIXED HYPERLIPIDEMIA: ICD-10-CM

## 2018-03-21 DIAGNOSIS — Z79.899 ENCOUNTER FOR LONG-TERM (CURRENT) USE OF MEDICATIONS: ICD-10-CM

## 2018-04-03 ENCOUNTER — OFFICE VISIT (OUTPATIENT)
Dept: INTERNAL MEDICINE | Facility: CLINIC | Age: 63
End: 2018-04-03
Payer: MEDICARE

## 2018-04-03 VITALS
TEMPERATURE: 98 F | HEART RATE: 89 BPM | HEIGHT: 62 IN | OXYGEN SATURATION: 98 % | WEIGHT: 141.75 LBS | SYSTOLIC BLOOD PRESSURE: 142 MMHG | DIASTOLIC BLOOD PRESSURE: 92 MMHG | BODY MASS INDEX: 26.09 KG/M2

## 2018-04-03 DIAGNOSIS — J06.9 UPPER RESPIRATORY TRACT INFECTION, UNSPECIFIED TYPE: Primary | ICD-10-CM

## 2018-04-03 PROCEDURE — 3077F SYST BP >= 140 MM HG: CPT | Mod: CPTII,S$GLB,, | Performed by: FAMILY MEDICINE

## 2018-04-03 PROCEDURE — 99999 PR PBB SHADOW E&M-EST. PATIENT-LVL III: CPT | Mod: PBBFAC,,, | Performed by: FAMILY MEDICINE

## 2018-04-03 PROCEDURE — 3080F DIAST BP >= 90 MM HG: CPT | Mod: CPTII,S$GLB,, | Performed by: FAMILY MEDICINE

## 2018-04-03 PROCEDURE — 96372 THER/PROPH/DIAG INJ SC/IM: CPT | Mod: S$GLB,,, | Performed by: FAMILY MEDICINE

## 2018-04-03 PROCEDURE — 99213 OFFICE O/P EST LOW 20 MIN: CPT | Mod: 25,S$GLB,, | Performed by: FAMILY MEDICINE

## 2018-04-03 RX ORDER — PROMETHAZINE HYDROCHLORIDE AND DEXTROMETHORPHAN HYDROBROMIDE 6.25; 15 MG/5ML; MG/5ML
5 SYRUP ORAL 3 TIMES DAILY
Qty: 150 ML | Refills: 0 | Status: SHIPPED | OUTPATIENT
Start: 2018-04-03 | End: 2018-04-13

## 2018-04-03 RX ORDER — DEXAMETHASONE SODIUM PHOSPHATE 100 MG/10ML
10 INJECTION INTRAMUSCULAR; INTRAVENOUS ONCE
Status: COMPLETED | OUTPATIENT
Start: 2018-04-03 | End: 2018-04-03

## 2018-04-03 RX ADMIN — DEXAMETHASONE SODIUM PHOSPHATE 10 MG: 100 INJECTION INTRAMUSCULAR; INTRAVENOUS at 11:04

## 2018-04-03 NOTE — PROGRESS NOTES
Subjective:       Patient ID: Nadia Damon is a 62 y.o. female.    Chief Complaint: Nasal Congestion    Several days' duration nasal congestion fullness of the right ear sore throat and slight productive cough.  She denies fever chills.  She denies sneezing or wheezing.      Review of Systems   Constitutional: Negative for chills and fever.   HENT: Positive for congestion, postnasal drip and sore throat. Negative for sinus pressure and sneezing.    Respiratory: Positive for cough. Negative for shortness of breath and wheezing.    Gastrointestinal: Negative for nausea.       Objective:      Physical Exam   Constitutional: She appears well-developed and well-nourished. No distress.   HENT:   Right Ear: External ear normal.   Left Ear: External ear normal.   Mouth/Throat: Oropharynx is clear and moist.   Nasal congestion mild erythema   Eyes: Conjunctivae are normal.   Cardiovascular: Normal rate and regular rhythm.    Pulmonary/Chest: Effort normal and breath sounds normal. She has no wheezes. She has no rales.   Lymphadenopathy:     She has no cervical adenopathy.       Office Visit on 01/12/2018   Component Date Value Ref Range Status    Rapid Influenza A Ag 01/12/2018 Negative  Negative Final    Rapid Influenza B Ag 01/12/2018 Negative  Negative Final     Acceptable 01/12/2018 Yes   Final     Assessment:       1. Upper respiratory tract infection, unspecified type        Plan:   Decadron injection and promethazine DM.  Fluids and rest.  She is due pneumococcal 23 and is being deferred.  Her blood pressures mildly elevated and she reports not having taken her blood pressure medications yet today.  Routine follow-up is planned.      Upper respiratory tract infection, unspecified type  -     dexamethasone injection 10 mg; Inject 1 mL (10 mg total) into the muscle once.  -     promethazine-dextromethorphan (PROMETHAZINE-DM) 6.25-15 mg/5 mL Syrp; Take 5 mLs by mouth 3 (three) times daily.   Dispense: 150 mL; Refill: 0

## 2018-04-04 DIAGNOSIS — F51.01 PRIMARY INSOMNIA: ICD-10-CM

## 2018-04-04 RX ORDER — ZOLPIDEM TARTRATE 10 MG/1
TABLET ORAL
Qty: 90 TABLET | Refills: 1 | Status: SHIPPED | OUTPATIENT
Start: 2018-04-04 | End: 2018-09-24 | Stop reason: SDUPTHER

## 2018-04-04 NOTE — TELEPHONE ENCOUNTER
----- Message from Ronan Kulkarni sent at 4/4/2018 10:14 AM CDT -----  Contact: Pt   Pt called [facility] has faxed a request for refill have not receive..[phone]

## 2018-04-11 ENCOUNTER — TELEPHONE (OUTPATIENT)
Dept: INTERNAL MEDICINE | Facility: CLINIC | Age: 63
End: 2018-04-11

## 2018-04-11 DIAGNOSIS — F51.01 PRIMARY INSOMNIA: ICD-10-CM

## 2018-04-11 NOTE — TELEPHONE ENCOUNTER
----- Message from Loli Head sent at 4/11/2018  2:44 PM CDT -----  Contact: pt  Pt states pharmacy has sent her a text her RX is ready.

## 2018-04-11 NOTE — TELEPHONE ENCOUNTER
----- Message from Vanna Miller sent at 4/11/2018  9:27 AM CDT -----  Contact: pt  Pt calling about her prescription refill she also stated she has been out for a few days, she can be reached at [phone] Thanks     1. What is the name of the medication you are requesting? ambin  2. What is the dose? 10 mg  3. How do you take the medication? Orally, topically, etc? Orally  4. How often do you take this medication? 1 a night  5. Do you need a 30 day or 90 day supply? 30  6. How many refills are you requesting?   7. What is your preferred pharmacy and location of the pharmacy?   8. Who can we contact with further questions?          [id number] [place] - [address] [facility] AT [address]  [place] [address]  Phone: [phone] Fax: [phone]

## 2018-04-11 NOTE — TELEPHONE ENCOUNTER
----- Message from Ronan Kulkarni sent at 4/11/2018  2:35 PM CDT -----  Contact: Pt   Pt called in regards to refill request pt states she spoke the nurse at 9 am this morning and nothing has been called in need to know when so please callback number..[phone]

## 2018-04-13 DIAGNOSIS — M79.7 FIBROMYALGIA: ICD-10-CM

## 2018-04-13 DIAGNOSIS — F51.01 PRIMARY INSOMNIA: ICD-10-CM

## 2018-04-13 DIAGNOSIS — M62.838 MUSCLE SPASMS OF BOTH LOWER EXTREMITIES: ICD-10-CM

## 2018-04-13 DIAGNOSIS — F13.939 BENZODIAZEPINE WITHDRAWAL WITH COMPLICATION: ICD-10-CM

## 2018-04-16 RX ORDER — BUSPIRONE HYDROCHLORIDE 10 MG/1
TABLET ORAL
Qty: 90 TABLET | Refills: 0 | Status: SHIPPED | OUTPATIENT
Start: 2018-04-16 | End: 2018-07-31 | Stop reason: SDUPTHER

## 2018-04-16 RX ORDER — PREGABALIN 50 MG/1
CAPSULE ORAL
Qty: 180 CAPSULE | Refills: 1 | Status: SHIPPED | OUTPATIENT
Start: 2018-04-16 | End: 2018-11-02 | Stop reason: SDUPTHER

## 2018-04-16 RX ORDER — ALPRAZOLAM 0.5 MG/1
TABLET ORAL
Qty: 90 TABLET | Refills: 0 | Status: SHIPPED | OUTPATIENT
Start: 2018-04-16 | End: 2020-03-31

## 2018-04-16 RX ORDER — TEMAZEPAM 30 MG/1
CAPSULE ORAL
Qty: 30 CAPSULE | Refills: 5 | Status: SHIPPED | OUTPATIENT
Start: 2018-04-16 | End: 2018-08-13 | Stop reason: SDUPTHER

## 2018-04-16 RX ORDER — BACLOFEN 10 MG/1
TABLET ORAL
Qty: 270 TABLET | Refills: 1 | Status: SHIPPED | OUTPATIENT
Start: 2018-04-16 | End: 2018-10-02 | Stop reason: SDUPTHER

## 2018-04-18 ENCOUNTER — OFFICE VISIT (OUTPATIENT)
Dept: INTERNAL MEDICINE | Facility: CLINIC | Age: 63
End: 2018-04-18
Payer: MEDICARE

## 2018-04-18 ENCOUNTER — APPOINTMENT (OUTPATIENT)
Dept: RADIOLOGY | Facility: HOSPITAL | Age: 63
End: 2018-04-18
Attending: INTERNAL MEDICINE
Payer: MEDICARE

## 2018-04-18 VITALS
HEART RATE: 87 BPM | TEMPERATURE: 98 F | HEIGHT: 62 IN | DIASTOLIC BLOOD PRESSURE: 79 MMHG | SYSTOLIC BLOOD PRESSURE: 125 MMHG | OXYGEN SATURATION: 98 % | BODY MASS INDEX: 26.75 KG/M2 | WEIGHT: 145.38 LBS

## 2018-04-18 DIAGNOSIS — R07.81 RIB PAIN ON RIGHT SIDE: Primary | ICD-10-CM

## 2018-04-18 DIAGNOSIS — Z94.1 STATUS POST HEART TRANSPLANT: ICD-10-CM

## 2018-04-18 DIAGNOSIS — Z79.52 LONG TERM CURRENT USE OF SYSTEMIC STEROIDS: ICD-10-CM

## 2018-04-18 DIAGNOSIS — R07.81 RIB PAIN ON RIGHT SIDE: ICD-10-CM

## 2018-04-18 DIAGNOSIS — T86.20 COMPLICATION OF HEART TRANSPLANT, UNSPECIFIED COMPLICATION: ICD-10-CM

## 2018-04-18 PROCEDURE — 3074F SYST BP LT 130 MM HG: CPT | Mod: CPTII,S$GLB,, | Performed by: FAMILY MEDICINE

## 2018-04-18 PROCEDURE — 99999 PR PBB SHADOW E&M-EST. PATIENT-LVL V: CPT | Mod: PBBFAC,,, | Performed by: FAMILY MEDICINE

## 2018-04-18 PROCEDURE — 71100 X-RAY EXAM RIBS UNI 2 VIEWS: CPT | Mod: TC,FY,PO

## 2018-04-18 PROCEDURE — 99213 OFFICE O/P EST LOW 20 MIN: CPT | Mod: S$GLB,,, | Performed by: FAMILY MEDICINE

## 2018-04-18 PROCEDURE — 3078F DIAST BP <80 MM HG: CPT | Mod: CPTII,S$GLB,, | Performed by: FAMILY MEDICINE

## 2018-04-18 PROCEDURE — 71100 X-RAY EXAM RIBS UNI 2 VIEWS: CPT | Mod: 26,,, | Performed by: RADIOLOGY

## 2018-04-18 RX ORDER — METHYLPREDNISOLONE 4 MG/1
TABLET ORAL
Qty: 1 PACKAGE | Refills: 0 | Status: SHIPPED | OUTPATIENT
Start: 2018-04-18 | End: 2018-05-09

## 2018-04-18 RX ORDER — ACETAMINOPHEN AND CODEINE PHOSPHATE 300; 30 MG/1; MG/1
1 TABLET ORAL
Qty: 20 TABLET | Refills: 0 | Status: SHIPPED | OUTPATIENT
Start: 2018-04-18 | End: 2019-08-29

## 2018-04-18 NOTE — PROGRESS NOTES
Subjective:       Patient ID: Nadia Damon is a 62 y.o. female.    Chief Complaint: right side pain and Back Pain    Since her upper respiratory infection with coughing 2 weeks ago she has developed pain in the right posterior chest region.  The pain is worse with certain motions but is not pleuritic pain.  She denies fever chills or shortness of breath      Back Pain   Associated symptoms include chest pain. Pertinent negatives include no abdominal pain or fever.     Review of Systems   Constitutional: Negative for chills and fever.   HENT: Negative for congestion.    Respiratory: Negative for cough, chest tightness, shortness of breath and wheezing.    Cardiovascular: Positive for chest pain. Negative for palpitations.        Right posterior chest wall   Gastrointestinal: Negative for abdominal pain.       Objective:      Physical Exam   Constitutional: She appears well-developed and well-nourished. No distress.   Cardiovascular: Normal rate and regular rhythm.    Pulmonary/Chest: Effort normal and breath sounds normal. No respiratory distress. She has no wheezes. She has no rales.   Localized tenderness right lower posterior rib no redness no rib deformity and no  rash       Office Visit on 01/12/2018   Component Date Value Ref Range Status    Rapid Influenza A Ag 01/12/2018 Negative  Negative Final    Rapid Influenza B Ag 01/12/2018 Negative  Negative Final     Acceptable 01/12/2018 Yes   Final     Assessment:       1. Rib pain on right side        Plan:     X-ray rib   Heat tid cont baclofen tid tylenol with codeine prn medrol dosp  Ov 1wk prn  Rib pain on right side  -     X-Ray Ribs 2 View Right; Future; Expected date: 04/18/2018

## 2018-04-20 ENCOUNTER — TELEPHONE (OUTPATIENT)
Dept: INTERNAL MEDICINE | Facility: CLINIC | Age: 63
End: 2018-04-20

## 2018-05-01 ENCOUNTER — PATIENT OUTREACH (OUTPATIENT)
Dept: ADMINISTRATIVE | Facility: HOSPITAL | Age: 63
End: 2018-05-01

## 2018-05-14 ENCOUNTER — LAB VISIT (OUTPATIENT)
Dept: LAB | Facility: HOSPITAL | Age: 63
End: 2018-05-14
Attending: INTERNAL MEDICINE
Payer: MEDICARE

## 2018-05-14 ENCOUNTER — OFFICE VISIT (OUTPATIENT)
Dept: NEPHROLOGY | Facility: CLINIC | Age: 63
End: 2018-05-14
Payer: MEDICARE

## 2018-05-14 VITALS
HEIGHT: 62 IN | DIASTOLIC BLOOD PRESSURE: 86 MMHG | SYSTOLIC BLOOD PRESSURE: 122 MMHG | WEIGHT: 144.38 LBS | HEART RATE: 96 BPM | BODY MASS INDEX: 26.57 KG/M2

## 2018-05-14 DIAGNOSIS — M54.9 ACUTE RIGHT-SIDED BACK PAIN, UNSPECIFIED BACK LOCATION: ICD-10-CM

## 2018-05-14 DIAGNOSIS — T86.19 NEPHROTOXICITY DUE TO CALCINEURIN INHIBITOR THERAPY OF TRANSPLANTED KIDNEY: ICD-10-CM

## 2018-05-14 DIAGNOSIS — N14.4 NEPHROTOXICITY DUE TO CALCINEURIN INHIBITOR THERAPY OF TRANSPLANTED KIDNEY: ICD-10-CM

## 2018-05-14 DIAGNOSIS — T45.1X5A NEPHROTOXICITY DUE TO CALCINEURIN INHIBITOR THERAPY OF TRANSPLANTED KIDNEY: ICD-10-CM

## 2018-05-14 DIAGNOSIS — N18.30 CHRONIC KIDNEY DISEASE, STAGE III (MODERATE): ICD-10-CM

## 2018-05-14 DIAGNOSIS — I10 ESSENTIAL HYPERTENSION: ICD-10-CM

## 2018-05-14 DIAGNOSIS — M54.9 ACUTE RIGHT-SIDED BACK PAIN, UNSPECIFIED BACK LOCATION: Primary | ICD-10-CM

## 2018-05-14 DIAGNOSIS — Z71.89 ENCOUNTER FOR MEDICATION REVIEW AND COUNSELING: ICD-10-CM

## 2018-05-14 LAB
ANION GAP SERPL CALC-SCNC: 7 MMOL/L
BASOPHILS # BLD AUTO: 0.02 K/UL
BASOPHILS NFR BLD: 0.5 %
BUN SERPL-MCNC: 31 MG/DL
CALCIUM SERPL-MCNC: 9.6 MG/DL
CHLORIDE SERPL-SCNC: 106 MMOL/L
CO2 SERPL-SCNC: 28 MMOL/L
CREAT SERPL-MCNC: 1.5 MG/DL
DIFFERENTIAL METHOD: ABNORMAL
EOSINOPHIL # BLD AUTO: 0.1 K/UL
EOSINOPHIL NFR BLD: 1.2 %
ERYTHROCYTE [DISTWIDTH] IN BLOOD BY AUTOMATED COUNT: 14.5 %
EST. GFR  (AFRICAN AMERICAN): 42.4 ML/MIN/1.73 M^2
EST. GFR  (NON AFRICAN AMERICAN): 36.8 ML/MIN/1.73 M^2
GLUCOSE SERPL-MCNC: 82 MG/DL
HCT VFR BLD AUTO: 31.8 %
HGB BLD-MCNC: 10.1 G/DL
IMM GRANULOCYTES # BLD AUTO: 0.01 K/UL
IMM GRANULOCYTES NFR BLD AUTO: 0.2 %
LYMPHOCYTES # BLD AUTO: 1.3 K/UL
LYMPHOCYTES NFR BLD: 31.7 %
MCH RBC QN AUTO: 28.6 PG
MCHC RBC AUTO-ENTMCNC: 31.8 G/DL
MCV RBC AUTO: 90 FL
MONOCYTES # BLD AUTO: 0.5 K/UL
MONOCYTES NFR BLD: 12.2 %
NEUTROPHILS # BLD AUTO: 2.3 K/UL
NEUTROPHILS NFR BLD: 54.2 %
NRBC BLD-RTO: 0 /100 WBC
PLATELET # BLD AUTO: 218 K/UL
PMV BLD AUTO: 10.1 FL
POTASSIUM SERPL-SCNC: 5.2 MMOL/L
RBC # BLD AUTO: 3.53 M/UL
SODIUM SERPL-SCNC: 141 MMOL/L
WBC # BLD AUTO: 4.17 K/UL

## 2018-05-14 PROCEDURE — 80048 BASIC METABOLIC PNL TOTAL CA: CPT

## 2018-05-14 PROCEDURE — 3074F SYST BP LT 130 MM HG: CPT | Mod: CPTII,S$GLB,, | Performed by: INTERNAL MEDICINE

## 2018-05-14 PROCEDURE — 36415 COLL VENOUS BLD VENIPUNCTURE: CPT

## 2018-05-14 PROCEDURE — 85025 COMPLETE CBC W/AUTO DIFF WBC: CPT

## 2018-05-14 PROCEDURE — 3008F BODY MASS INDEX DOCD: CPT | Mod: CPTII,S$GLB,, | Performed by: INTERNAL MEDICINE

## 2018-05-14 PROCEDURE — 99215 OFFICE O/P EST HI 40 MIN: CPT | Mod: S$GLB,,, | Performed by: INTERNAL MEDICINE

## 2018-05-14 PROCEDURE — 99999 PR PBB SHADOW E&M-EST. PATIENT-LVL V: CPT | Mod: PBBFAC,,, | Performed by: INTERNAL MEDICINE

## 2018-05-14 PROCEDURE — 3079F DIAST BP 80-89 MM HG: CPT | Mod: CPTII,S$GLB,, | Performed by: INTERNAL MEDICINE

## 2018-05-14 PROCEDURE — 99499 UNLISTED E&M SERVICE: CPT | Mod: S$PBB,,, | Performed by: INTERNAL MEDICINE

## 2018-05-14 NOTE — PROGRESS NOTES
NEPHROLOGY CLINIC FOLLOWUP NOTE  Date of clinic visit: 5/14/18     REASON FOR FOLLOWUP AND CHIEF COMPLAINT: History of chronic kidney disease post-heart transplant.     HISTORY OF PRESENT ILLNESS: Ms. Damon is a 62-year-old  female with a history of heart transplant in 1993, who presents for f/u. 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 However, pt has not had a kidney biopsy. Pt presents for followup. She also has a h/o of HTN.    Pt reports that she has had R side and back pain in the past 3 wks. Pt says that the pain has occurred since she had an URI sx's then. The pain is worse when shs is resting, and when she is taking deep breaths, she reports no changes with urination, no abd pain, no fever, no burning of pain on urination. She finds herself to be restless with regard to this pain. No falls, no trauma. Noted pt has seen PCP with regard to this pain and a rib series did not show a fx. She has not has any new labs since Jan 2018.    PAST MEDICAL HISTORY:  1. CKD stage III.  2. Hypertension.  3. Heart transplant for cardiomyopathy in 1993, the patient has been on chronic  cyclosporine treatment.  4. Carpal tunnel syndrome.  5. Fibromyalgia.  6. GERD.  7. Bell's palsy.  8. Depression.     PAST SURGICAL HISTORY: Reviewed as above, unchanged.     MEDICATIONS: Reviewed, as per previous note, unchanged.     SOCIAL HISTORY: Reviewed. Negative for smoking. No alcohol use.     MEDICATIONS: Reviewed. Amlodipine 5 mg po qd, Toprol-XL 25 mg p.o. daily. Hydralazine 100 mg tid, s/p HCTZ 12.5 mg daily. Other medications were   carefully reviewed and noted and discussed with the patient.     REVIEW OF SYSTEMS: No recent hospitalizations.  GENERAL: Negative.  HEAD, EYES, EARS, NOSE, AND THROAT: Negative.  CARDIAC: Negative.  PULMONARY: Negative.  GASTROINTESTINAL: Negative.  GENITOURINARY: Negative.  PSYCHOLOGICAL: Negative.  NEUROLOGICAL:  Negative.  ENDOCRINE: Negative.  HEMATOLOGIC AND ONCOLOGIC: Negative.  INFECTIOUS DISEASE: Negative.  The rest of the review of systems negative.     PHYSICAL EXAMINATION:  VITAL SIGNS: Repeat blood pressure is 122/86. Pulse is 96, weight is 144 lbs, last visit 142 lbs,   GENERAL: She is cooperative, pleasant, in no acute distress.   Speech and thought process appropriate, normal.  HEENT: Mucous membranes moist.  NECK: Neck JVD. Normal hearing.  HEART: Regular rate and rhythm, S1 and S2 audible. No rubs.  CHEST: Clear to auscultation. No rales or wheezes. Breathing symmetric, unlabored.  ABDOMEN: Soft, nontender.  Side and back: mid back, non tender, no sign of trauma  EXTREMITIES: no edema.     LABS: Reviewed. No recent labs.  Jan 2018 labs:  BMP  Lab Results   Component Value Date     01/07/2018    K 4.8 01/07/2018     01/07/2018    CO2 21 (L) 01/07/2018    BUN 34 (H) 01/07/2018    CREATININE 2.2 (H) 01/07/2018    CALCIUM 9.6 01/07/2018    ANIONGAP 15 01/07/2018    ESTGFRAFRICA 27 (A) 01/07/2018    EGFRNONAA 23 (A) 01/07/2018     Lab Results   Component Value Date    WBC 3.48 (L) 01/07/2018    HGB 10.2 (L) 01/07/2018    HCT 30.8 (L) 01/07/2018    MCV 86 01/07/2018     01/07/2018       urine P/Cr 740 mg       ASSESSMENT AND PLAN: This is a 62-year-old female who presents for follow up of CKD and reports subacute right sided pain in her mid back  The patient has post-heart transplant, chronic kidney disease and proteinuria.   The impression is as follows:     1. Renal. Renal function is stable,  s Cr not worse in Jan 2018  However, no new labs since then, will repeat labs today  CKD stage 3 at baseline  Suspect calcineurin inhibitor toxicity (no prior kidney biopsies, however)  K normal     2. HTN: BP controlled  Reviewed meds with pt     3. History of heart transplant on cyclosporine.  Per heart transplant team.     4. Proteinuria. Mild. Less than 1 g per day  Repeat is lower 740 mg.  Would  benefit from an ACE inhibitor or angiotensin receptor blocker.   Will look at K on pending labs     5. Right sided mid back pain:  Appears worse with resting and breathing  Noted recent URI infection  No fx's on rib series  No sign of pneumonia on recent CXR  DDX: kidney stone vs UTI   Will order u/a, urine cx, BMP, CBC, KUB, and renal u/s    PLANS AND RECOMMENDATIONS:   As discussed above  W/u as above  RTc in 2-3 days after above tests are done  No medication changes today  Total time spent 40 minutes. Multiple issues addressed  Extensive time spent including the time to review the records, the patient evaluation, documentation, face-to-face  discussion with the patient. More than 50% of the time was spent in counseling  and coordination of care. Level V visit.     Carter Crawford MD

## 2018-05-16 ENCOUNTER — HOSPITAL ENCOUNTER (OUTPATIENT)
Dept: RADIOLOGY | Facility: HOSPITAL | Age: 63
Discharge: HOME | End: 2018-05-16
Attending: INTERNAL MEDICINE
Payer: MEDICARE

## 2018-05-16 ENCOUNTER — OFFICE VISIT (OUTPATIENT)
Dept: NEPHROLOGY | Facility: CLINIC | Age: 63
End: 2018-05-16
Payer: MEDICARE

## 2018-05-16 VITALS
SYSTOLIC BLOOD PRESSURE: 118 MMHG | WEIGHT: 145.5 LBS | HEART RATE: 86 BPM | DIASTOLIC BLOOD PRESSURE: 78 MMHG | HEIGHT: 62 IN | BODY MASS INDEX: 26.78 KG/M2

## 2018-05-16 DIAGNOSIS — Z71.89 ENCOUNTER FOR MEDICATION REVIEW AND COUNSELING: ICD-10-CM

## 2018-05-16 DIAGNOSIS — E79.0 HYPERURICEMIA: ICD-10-CM

## 2018-05-16 DIAGNOSIS — M54.9 ACUTE RIGHT-SIDED BACK PAIN, UNSPECIFIED BACK LOCATION: ICD-10-CM

## 2018-05-16 DIAGNOSIS — R10.9 SIDE PAIN: ICD-10-CM

## 2018-05-16 DIAGNOSIS — N20.0 NEPHROLITHIASIS: Primary | ICD-10-CM

## 2018-05-16 DIAGNOSIS — N18.30 CHRONIC KIDNEY DISEASE, STAGE III (MODERATE): ICD-10-CM

## 2018-05-16 PROCEDURE — 3078F DIAST BP <80 MM HG: CPT | Mod: CPTII,S$GLB,, | Performed by: INTERNAL MEDICINE

## 2018-05-16 PROCEDURE — 3008F BODY MASS INDEX DOCD: CPT | Mod: CPTII,S$GLB,, | Performed by: INTERNAL MEDICINE

## 2018-05-16 PROCEDURE — 99499 UNLISTED E&M SERVICE: CPT | Mod: S$GLB,,, | Performed by: INTERNAL MEDICINE

## 2018-05-16 PROCEDURE — 76770 US EXAM ABDO BACK WALL COMP: CPT | Mod: TC

## 2018-05-16 PROCEDURE — 3074F SYST BP LT 130 MM HG: CPT | Mod: CPTII,S$GLB,, | Performed by: INTERNAL MEDICINE

## 2018-05-16 PROCEDURE — 99215 OFFICE O/P EST HI 40 MIN: CPT | Mod: S$GLB,,, | Performed by: INTERNAL MEDICINE

## 2018-05-16 PROCEDURE — 74018 RADEX ABDOMEN 1 VIEW: CPT | Mod: TC

## 2018-05-16 PROCEDURE — 99999 PR PBB SHADOW E&M-EST. PATIENT-LVL V: CPT | Mod: PBBFAC,,, | Performed by: INTERNAL MEDICINE

## 2018-05-16 NOTE — PROGRESS NOTES
"NEPHROLOGY CLINIC FOLLOWUP NOTE  Date of clinic visit: 5/16/18     REASON FOR FOLLOWUP AND CHIEF COMPLAINT: right sided back pain and history of chronic kidney disease post-heart transplant.     HISTORY OF PRESENT ILLNESS: Ms. Damon is a 62-year-old  female with a history of CKD and heart transplant in 1993, who presents for f/u of above. Pt saw me 3 days ago rather acutely for right sided back pain. Pt reported that she has had R side and back pain in the past 3 wks. Pt said that the pain has occurred since she had an URI sx's then. The pain is worse when she is resting and she finds herself to be restless. She reports no changes with urination, but going to the bathroom brought her some temporary relief. Pt had no abd pain, no fever, no burning of pain on urination. No falls, no trauma. Noted pt has seen PCP with regard to this pain and a rib series did not show a fx. She has not has any new labs since Jan 2018. A kidney stone vs UTI was suspected. W/u was ordered. Pt now presents for f/u. 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 However, pt has not had a kidney biopsy. She also has a h/o of HTN.        PAST MEDICAL HISTORY:  1. CKD stage III.  2. Hypertension.  3. Heart transplant for cardiomyopathy in 1993, the patient has been on chronic  cyclosporine treatment.  4. Carpal tunnel syndrome.  5. Fibromyalgia.  6. GERD.  7. Bell's palsy.  8. Depression.     PAST SURGICAL HISTORY: Reviewed as above, unchanged.     MEDICATIONS: Reviewed, as per previous note, unchanged.     SOCIAL HISTORY: Reviewed. Negative for smoking. No alcohol use.     MEDICATIONS: Reviewed. Amlodipine 5 mg po qd, Toprol-XL 25 mg p.o. daily. Hydralazine 100 mg tid, s/p HCTZ 12.5 mg daily. Other medications were   carefully reviewed and noted and discussed with the patient.     REVIEW OF SYSTEMS: No " recent hospitalizations.  GENERAL: Negative.  HEAD, EYES, EARS, NOSE, AND THROAT: Negative.  CARDIAC: Negative.  PULMONARY: Negative.  GASTROINTESTINAL: Negative.  GENITOURINARY: Negative.  PSYCHOLOGICAL: Negative.  NEUROLOGICAL: Negative.  ENDOCRINE: Negative.  HEMATOLOGIC AND ONCOLOGIC: Negative.  INFECTIOUS DISEASE: Negative.  The rest of the review of systems negative.     PHYSICAL EXAMINATION:  VITAL SIGNS: Repeat blood pressure is 118/78. Pulse is 86, weight is 145 lbs, last visit 144 lbs  GENERAL: She is cooperative, pleasant, in no acute distress.   Speech and thought process appropriate, normal.  HEENT: Mucous membranes moist.  NECK: Neck JVD. Normal hearing.  HEART: Regular rate and rhythm, S1 and S2 audible. No rubs.  CHEST: Clear to auscultation. No rales or wheezes. Breathing symmetric, unlabored.  ABDOMEN: Soft, nontender.  Side and back: mid back, non tender, no sign of trauma  EXTREMITIES: no edema.     LABS: Reviewed. No recent labs.  BMP  Lab Results   Component Value Date     05/14/2018    K 5.2 (H) 05/14/2018     05/14/2018    CO2 28 05/14/2018    BUN 31 (H) 05/14/2018    CREATININE 1.5 (H) 05/14/2018    CALCIUM 9.6 05/14/2018    ANIONGAP 7 (L) 05/14/2018    ESTGFRAFRICA 42.4 (A) 05/14/2018    EGFRNONAA 36.8 (A) 05/14/2018     Lab Results   Component Value Date    WBC 4.17 05/14/2018    HGB 10.1 (L) 05/14/2018    HCT 31.8 (L) 05/14/2018    MCV 90 05/14/2018     05/14/2018     U/a unremarkable  Urine Cx: neg   Urine P/Cr 740 mg     KUB: unremarkable, except for some constipation    Renal u/s: FINDINGS:  Right kidney: The right kidney measures 9.6 cm. Mild cortical thinning and increased cortical echogenicity.  10 and 12 mm stones are seen in the upper and lower poles respectively.  No cortical thinning. No loss of corticomedullary distinction. No mass. No renal stone. No hydronephrosis.  Left kidney: The left kidney measures 9.3 cm. Mild cortical thinning and increased cortical  echogenicity. No loss of corticomedullary distinction. No mass. No renal stone. No hydronephrosis.  The bladder is partially distended at the time of scanning and has an unremarkable appearance.         ASSESSMENT AND PLAN: This is a 62-year-old female who presents for follow up of CKD and has new and persistent subacute right sided pain in her mid back  The patient has post-heart transplant, chronic kidney disease and proteinuria.   The impression is as follows:     1. Right sided mid back pain:  Persistent and unchanged   Renal u/s shows 2 right sided kidney stones, same side as pt's c/o  Pain is likely due to nephrolithiasis  U Cx neg  KUB unremarkable  Pt may have uric acid stones  Pt's risk factor for uric acid stones are: taking cyclosporine immunosuppression for heart transplant (causes hyperuricemia) and eating sea food and crawfish frequently.  Pt also has other risk factors: takes 1 g of Vit C per day  Pt was advised.  Needless to say, pt was advised NOT TO STOP cyclosporine.    Pt was advised of dietary risk factors for kidney stones, was given printed literature  Advised pt to:  -increase water intake to >2 L/day  -reduce salt in diet to <3 g/day  -reduce meat intake, specially seafood  -keep ca intake average  -d/c Vit C  24 hour urine was ordered for stone risk stratification    The stones appear too large to pass by themselves  Pt was referred to Dr. Mccarthy for evaluation and urologic intervention//lithotripsy.      2. Renal. 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 HTN: BP controlled  Reviewed meds with pt     4. History of heart transplant on cyclosporine.  Per heart transplant team.     5. Proteinuria. Mild. Less than 1 g per day  Repeat is lower 740 mg.  Would benefit from an ACE inhibitor or angiotensin receptor blocker.   Will look at K on pending labs        PLANS AND  RECOMMENDATIONS:   As discussed above  W/u as above  RTC 1 month  Total time spent 40 minutes. Multiple issues addressed  Extensive time spent including the time to review the records, the patient evaluation, documentation, face-to-face  discussion with the patient. More than 50% of the time was spent in counseling  and coordination of care. Level V visit.     Carter Crawford MD

## 2018-05-17 ENCOUNTER — OFFICE VISIT (OUTPATIENT)
Dept: UROLOGY | Facility: CLINIC | Age: 63
End: 2018-05-17
Payer: MEDICARE

## 2018-05-17 ENCOUNTER — HOSPITAL ENCOUNTER (OUTPATIENT)
Dept: RADIOLOGY | Facility: HOSPITAL | Age: 63
Discharge: HOME | End: 2018-05-17
Attending: UROLOGY
Payer: MEDICARE

## 2018-05-17 VITALS — HEIGHT: 62 IN | BODY MASS INDEX: 26.78 KG/M2 | WEIGHT: 145.5 LBS

## 2018-05-17 DIAGNOSIS — M54.9 BACK PAIN, UNSPECIFIED BACK LOCATION, UNSPECIFIED BACK PAIN LATERALITY, UNSPECIFIED CHRONICITY: ICD-10-CM

## 2018-05-17 DIAGNOSIS — N20.0 RENAL STONE: Primary | ICD-10-CM

## 2018-05-17 DIAGNOSIS — M54.50 LOW BACK PAIN, NON-SPECIFIC: ICD-10-CM

## 2018-05-17 LAB
BILIRUB SERPL-MCNC: NORMAL MG/DL
BLOOD URINE, POC: NORMAL
COLOR, POC UA: YELLOW
GLUCOSE UR QL STRIP: NORMAL
KETONES UR QL STRIP: NORMAL
LEUKOCYTE ESTERASE URINE, POC: NORMAL
NITRITE, POC UA: NORMAL
PH, POC UA: 6
PROTEIN, POC: NORMAL
SPECIFIC GRAVITY, POC UA: 1.01
UROBILINOGEN, POC UA: NORMAL

## 2018-05-17 PROCEDURE — 99204 OFFICE O/P NEW MOD 45 MIN: CPT | Mod: 25,S$GLB,, | Performed by: UROLOGY

## 2018-05-17 PROCEDURE — 72100 X-RAY EXAM L-S SPINE 2/3 VWS: CPT | Mod: 26,,, | Performed by: RADIOLOGY

## 2018-05-17 PROCEDURE — 3008F BODY MASS INDEX DOCD: CPT | Mod: CPTII,S$GLB,, | Performed by: UROLOGY

## 2018-05-17 PROCEDURE — 99999 PR PBB SHADOW E&M-EST. PATIENT-LVL II: CPT | Mod: PBBFAC,,, | Performed by: UROLOGY

## 2018-05-17 PROCEDURE — 81002 URINALYSIS NONAUTO W/O SCOPE: CPT | Mod: S$GLB,,, | Performed by: UROLOGY

## 2018-05-17 PROCEDURE — 72100 X-RAY EXAM L-S SPINE 2/3 VWS: CPT | Mod: TC

## 2018-05-17 NOTE — LETTER
May 17, 2018      Carter Crawford MD  [address] [facility] [id number]           [facility]  [address] [place] [id number]  Phone: [phone]  Fax: [phone]          Patient: Nadia Damon   MR Number: [id number]   YOB: 1955   Date of Visit: 5/17/2018       Dear Dr. Carter Crawford:    Thank you for referring Nadia Damon to me for evaluation. Attached you will find relevant portions of my assessment and plan of care.    If you have questions, please do not hesitate to call me. I look forward to following Nadia Damon along with you.    Sincerely,    Parker Mccarthy IV, MD    Enclosure  CC:  No Recipients    If you would like to receive this communication electronically, please contact [email or web address] or [phone] to request more information on [employer] access.    For providers and/or their staff who would like to refer a patient to [facility], please contact us through our one-stop-shop provider referral line, [facility], at [phone].    If you feel you have received this communication in error or would no longer like to receive these types of communications, please e-mail [email or web address]

## 2018-05-17 NOTE — PROGRESS NOTES
Chief Complaint: Urolithiasis    HPI:   5/17/18: 61 yo woman worked by Dr. Crawford recently found on US to have 1 cm upper and lower pole right renal stones.  There is no hydronephrosis.  Pain is right side, over right hip pocket to sacrum.   Pain is positional and other positions make it better.  Stones not visible on KUB.  Urine pH has been 6-8 on recent evaluations.  No abd/pelvic pain and no exac/rel factors.  No gross hematuria.  No prior urolithiasis.  No urinary bother.  No  history.      Allergies:  Lisinopril; Atorvastatin; and Hydrocodone    Medications: has a current medication list which includes the following prescription(s): acetaminophen-codeine 300-30mg, alprazolam, amlodipine, ammonium lactate, aspirin, baclofen, biotin, buspirone, calcium carb,cit-mag12-vit d3, cyclosporine modified (neoral), cyclosporine modified (neoral), docusate sodium, duloxetine, estradiol, evening primrose oil, fluticasone, hydralazine, lyrica, metoprolol succinate, multivitamin, omeprazole, temazepam, vitamin e, zolpidem, ferrous gluconate, and mometasone.    Review of Systems:  General: No fever, chills, fatigability, or weight loss.  Skin: No rashes, itching, or changes in color or texture of skin.  Chest: Denies DONOHUE, cyanosis, wheezing, cough, and sputum production.  Abdomen: Appetite fine. No weight loss. Denies diarrhea, abdominal pain, hematemesis, or blood in stool.  Musculoskeletal: No joint stiffness or swelling. Denies back pain.  : As above.  All other review of systems negative.    PMH:   has a past medical history of Anxiety; Arthritis; Chronic kidney disease; Coronary artery disease; Depression; Fibromyalgia; Heart failure; Heart transplanted (1993); Hypertension; Immune disorder; Iron deficiency anemia (8/15/2017); Obesity; Shingles (2003 approx); Trouble in sleeping; and Urinary incontinence.    PSH:   has a past surgical history that includes Cardiac pacemaker removal (Left, 06/26/14); Bladder surgery  (2015 approx); Heart transplant (1993); Carpal tunnel release (Left, 03/03/15); Hysterectomy (1983); Hernia repair (Right, 1971 approx); Breast surgery (Left, 9/28/15); and Breast surgery (Right, 12/2015).    FamHx: family history includes Breast cancer in her mother; Cancer (age of onset: 38) in her mother; Cataracts in her cousin; Heart disease in her maternal grandmother; Hypertension in her son.    SocHx:  reports that she has never smoked. She has never used smokeless tobacco. She reports that she does not drink alcohol or use drugs.     Physical Exam:  Vitals: There were no vitals filed for this visit.  General: A&Ox3. No apparent distress. No deformities.  Neck: No masses. Normal thyroid.  Lungs: normal inspiration. No use of accessory muscles.  Heart: normal pulse. No arrhythmias.  Abdomen: Soft. NT. ND. No masses. No hernias. No hepatosplenomegaly.  Lymphatic: Neck and groin nodes negative.  Skin: The skin is warm and dry. No jaundice.  Ext: No c/c/e.  : deferred    Labs/Studies:     Impression/Plan:   1. Ct RSS to affirm stone burden, and might start potassium citrate dissolution but pH has been at target values already in history.  Back after CT done.  2. PT for back pain.

## 2018-05-18 ENCOUNTER — OFFICE VISIT (OUTPATIENT)
Dept: OPHTHALMOLOGY | Facility: CLINIC | Age: 63
End: 2018-05-18
Payer: MEDICARE

## 2018-05-18 DIAGNOSIS — I10 ESSENTIAL HYPERTENSION: ICD-10-CM

## 2018-05-18 DIAGNOSIS — H25.13 CATARACT, NUCLEAR SCLEROTIC SENILE, BILATERAL: Primary | ICD-10-CM

## 2018-05-18 DIAGNOSIS — H52.7 REFRACTIVE ERROR: ICD-10-CM

## 2018-05-18 PROCEDURE — 92014 COMPRE OPH EXAM EST PT 1/>: CPT | Mod: S$GLB,,, | Performed by: OPTOMETRIST

## 2018-05-18 PROCEDURE — 99999 PR PBB SHADOW E&M-EST. PATIENT-LVL II: CPT | Mod: PBBFAC,,, | Performed by: OPTOMETRIST

## 2018-05-18 PROCEDURE — 92015 DETERMINE REFRACTIVE STATE: CPT | Mod: S$GLB,,, | Performed by: OPTOMETRIST

## 2018-05-18 PROCEDURE — 99499 UNLISTED E&M SERVICE: CPT | Mod: S$GLB,,, | Performed by: OPTOMETRIST

## 2018-05-18 NOTE — PROGRESS NOTES
HPI     Pt states Rx for Glasses Need To be Stronger. She Has not purchased Last   years RX for glasses. She uses Magnifiers over Glasses.   Pt says OU are Straining and Watery  Last exam 5/12/17 TRF    Last edited by Paxton Vasques, OD on 5/18/2018  2:55 PM. (History)            Assessment /Plan     For exam results, see Encounter Report.    Cataract, nuclear sclerotic senile, bilateral    Refractive error    Essential hypertension      Mild cataracts OU, not surgical.    No HTN Retinopathy    Dispense Final Rx for glasses.  RTC 1 year  Discussed above and answered questions.

## 2018-05-18 NOTE — PATIENT INSTRUCTIONS
Cataract, nuclear sclerotic senile, bilateral    Refractive error    Essential hypertension      Mild cataracts OU, not surgical.    No HTN Retinopathy    Dispense Final Rx for glasses.  RTC 1 year  Discussed above and answered questions.

## 2018-05-30 ENCOUNTER — PES CALL (OUTPATIENT)
Dept: ADMINISTRATIVE | Facility: CLINIC | Age: 63
End: 2018-05-30

## 2018-06-07 ENCOUNTER — TELEPHONE (OUTPATIENT)
Dept: TRANSPLANT | Facility: CLINIC | Age: 63
End: 2018-06-07

## 2018-06-11 ENCOUNTER — HOSPITAL ENCOUNTER (OUTPATIENT)
Dept: RADIOLOGY | Facility: HOSPITAL | Age: 63
Discharge: HOME | End: 2018-06-11
Attending: UROLOGY
Payer: MEDICARE

## 2018-06-11 DIAGNOSIS — N20.0 RENAL STONE: ICD-10-CM

## 2018-06-11 DIAGNOSIS — M54.9 BACK PAIN, UNSPECIFIED BACK LOCATION, UNSPECIFIED BACK PAIN LATERALITY, UNSPECIFIED CHRONICITY: ICD-10-CM

## 2018-06-11 PROCEDURE — 74176 CT ABD & PELVIS W/O CONTRAST: CPT | Mod: TC

## 2018-06-14 ENCOUNTER — TELEPHONE (OUTPATIENT)
Dept: INTERNAL MEDICINE | Facility: CLINIC | Age: 63
End: 2018-06-14

## 2018-06-14 ENCOUNTER — OFFICE VISIT (OUTPATIENT)
Dept: INTERNAL MEDICINE | Facility: CLINIC | Age: 63
End: 2018-06-14
Payer: MEDICARE

## 2018-06-14 VITALS
HEART RATE: 96 BPM | WEIGHT: 141.75 LBS | DIASTOLIC BLOOD PRESSURE: 82 MMHG | TEMPERATURE: 97 F | SYSTOLIC BLOOD PRESSURE: 130 MMHG | BODY MASS INDEX: 26.09 KG/M2 | OXYGEN SATURATION: 97 % | HEIGHT: 62 IN

## 2018-06-14 DIAGNOSIS — M54.50 ACUTE RIGHT-SIDED LOW BACK PAIN WITHOUT SCIATICA: Primary | ICD-10-CM

## 2018-06-14 PROCEDURE — 3079F DIAST BP 80-89 MM HG: CPT | Mod: CPTII,S$GLB,, | Performed by: NURSE PRACTITIONER

## 2018-06-14 PROCEDURE — 99213 OFFICE O/P EST LOW 20 MIN: CPT | Mod: S$GLB,,, | Performed by: NURSE PRACTITIONER

## 2018-06-14 PROCEDURE — 3008F BODY MASS INDEX DOCD: CPT | Mod: CPTII,S$GLB,, | Performed by: NURSE PRACTITIONER

## 2018-06-14 PROCEDURE — 3075F SYST BP GE 130 - 139MM HG: CPT | Mod: CPTII,S$GLB,, | Performed by: NURSE PRACTITIONER

## 2018-06-14 PROCEDURE — 99999 PR PBB SHADOW E&M-EST. PATIENT-LVL V: CPT | Mod: PBBFAC,,, | Performed by: NURSE PRACTITIONER

## 2018-06-14 NOTE — PROGRESS NOTES
Subjective:       Patient ID: Nadia Damon is a 62 y.o. female.    Chief Complaint: Back Pain    Patient presents for right-sided back pain. She has been followed by PCP, nephrology, and urology for this pain. She had recent CT for possible kidney stone. She is also in PT for back pain but states she did not go today.      Back Pain   This is a new problem. The current episode started more than 1 month ago. The problem occurs daily. The problem has been gradually worsening since onset. The pain is present in the lumbar spine. The quality of the pain is described as stabbing and aching. The pain radiates to the right thigh. The pain is at a severity of 8/10. The symptoms are aggravated by twisting and standing (deep breaths). Pertinent negatives include no bladder incontinence, bowel incontinence, dysuria, fever, leg pain, numbness, perianal numbness, tingling or weakness. Risk factors include renal stones. She has tried heat (heat; lyrica, cymbalta, baclofen) for the symptoms. The treatment provided no relief.     Review of Systems   Constitutional: Negative for chills, fever and unexpected weight change.   Gastrointestinal: Negative for bowel incontinence.   Genitourinary: Negative for bladder incontinence and dysuria.        Denies urinary incontinence   Musculoskeletal: Positive for back pain.   Neurological: Negative for tingling, weakness and numbness.       Objective:      Physical Exam   Constitutional: She appears well-developed and well-nourished. No distress.   Cardiovascular: Normal rate and regular rhythm.    No murmur heard.  Musculoskeletal:   Lumbar paraspinous muscles tender to palpation.   Negative straight leg bilaterally.   Neurological: She is alert.   Skin: She is not diaphoretic.   Psychiatric: She exhibits a depressed mood.   Vitals reviewed.      Assessment:       1. Acute right-sided low back pain without sciatica        Plan:   Acute right-sided low back pain without  "sciatica      Patient is currently on lyrica, cymbalta, and baclofen along with benzos and sleep aids. Discussed with her possible sources of her pain including stone vs. Constipation. She stated she "wanted an injection in the back" to make the pain go away today. Patient not a good candidate for toradol due to cyclosporin and transplant. Advised patient to continue current medications and add Miralax, 1 capful daily for next 3-5 days. Continue stool softener. If pain worsens, please seek immediate medical attention.  650 mg tylenol every 8 hours. Ok to continue PT.    Follow-up in about 4 days (around 6/18/2018), or if symptoms worsen or fail to improve, for with PCP for back pain.      "

## 2018-06-14 NOTE — TELEPHONE ENCOUNTER
----- Message from Morgan De Souza sent at 6/14/2018  1:36 PM CDT -----  Contact: pt  She's calling in regards to being worked into the schedule today, [phone] (home)

## 2018-06-14 NOTE — PATIENT INSTRUCTIONS
Miralax, 1 capful daily for next 3-5 days. Continue stool softener. If pain worsens, please seek immediate medical attention.  650 mg tylenol every 8 hours

## 2018-06-18 ENCOUNTER — OFFICE VISIT (OUTPATIENT)
Dept: INTERNAL MEDICINE | Facility: CLINIC | Age: 63
End: 2018-06-18
Payer: MEDICARE

## 2018-06-18 VITALS
BODY MASS INDEX: 26.09 KG/M2 | HEART RATE: 93 BPM | DIASTOLIC BLOOD PRESSURE: 81 MMHG | SYSTOLIC BLOOD PRESSURE: 112 MMHG | WEIGHT: 141.75 LBS | TEMPERATURE: 97 F | HEIGHT: 62 IN | OXYGEN SATURATION: 100 %

## 2018-06-18 DIAGNOSIS — G89.29 CHRONIC MIDLINE LOW BACK PAIN WITHOUT SCIATICA: Primary | ICD-10-CM

## 2018-06-18 DIAGNOSIS — M54.50 CHRONIC MIDLINE LOW BACK PAIN WITHOUT SCIATICA: Primary | ICD-10-CM

## 2018-06-18 PROCEDURE — 3079F DIAST BP 80-89 MM HG: CPT | Mod: CPTII,S$GLB,, | Performed by: FAMILY MEDICINE

## 2018-06-18 PROCEDURE — 3008F BODY MASS INDEX DOCD: CPT | Mod: CPTII,S$GLB,, | Performed by: FAMILY MEDICINE

## 2018-06-18 PROCEDURE — 99999 PR PBB SHADOW E&M-EST. PATIENT-LVL IV: CPT | Mod: PBBFAC,,, | Performed by: FAMILY MEDICINE

## 2018-06-18 PROCEDURE — 99499 UNLISTED E&M SERVICE: CPT | Mod: S$GLB,,, | Performed by: FAMILY MEDICINE

## 2018-06-18 PROCEDURE — 99213 OFFICE O/P EST LOW 20 MIN: CPT | Mod: S$GLB,,, | Performed by: FAMILY MEDICINE

## 2018-06-18 PROCEDURE — 3074F SYST BP LT 130 MM HG: CPT | Mod: CPTII,S$GLB,, | Performed by: FAMILY MEDICINE

## 2018-06-18 NOTE — PROGRESS NOTES
Subjective:       Patient ID: Nadia Damon is a 62 y.o. female.    Chief Complaint: Follow-up    She continues with low back pain.  The pain is worse in certain positions.  Previous x-ray of lumbar spine with some facet arthropathy.  CT of the abdomen stone protocol is negative.  KUB with excess stool.  She has been taking MiraLAX that has resolved the excess stool.  She's also had about 1 month of physical therapy on her back that has not helped.  She denies abdominal pain.      Review of Systems   Constitutional: Negative for chills and fever.   Respiratory: Negative for cough.    Cardiovascular: Negative for chest pain and palpitations.   Gastrointestinal: Negative for abdominal distention and abdominal pain.   Genitourinary: Negative for difficulty urinating.   Musculoskeletal: Positive for back pain.   Neurological: Negative for weakness, numbness and headaches.       Objective:      Physical Exam   Constitutional: She appears well-developed and well-nourished.   Cardiovascular: Normal rate and regular rhythm.    Pulmonary/Chest: Effort normal and breath sounds normal.   Musculoskeletal:   Lumbar tenderness.  Straight leg raises are negative.       Lab Visit on 05/18/2018   Component Date Value Ref Range Status    Oxalate, Ur 05/18/2018 0.23  0.11 - 0.46 mmol/24 h Final    Oxalate, Ur 05/18/2018 20.2  9.7 - 40.5 mg/24 h Final    Oxalate Clt Tm 05/18/2018 24  h Final    Urine Volume Oxalate 05/18/2018 1750  mL Final    Oxalate Conc (mmol/L) 05/18/2018 0.13  mmol/L Final    Oxalate Conc 05/18/2018 11.4  mg/L Final    Citrate 24H UR 05/18/2018 203  mg/24 h Final    Citrate Excretion Collection Durat* 05/18/2018 24  h Final    Urine Volume Citrate 05/18/2018 1750  mL Final    Citrate Conc 05/18/2018 11.6  mg/dL Final    Urine Volume 05/18/2018 1750  mL Final    Urine Collection Duration 05/18/2018 24  Hr Final    Urine Uric Acid 05/18/2018 7.9  <70.0 mg/dL Final    Uric Acid, 24H Ur 05/18/2018  5.8* 12.5 - 42.0 mg/Hr Final    Uric Acid Ur (mg/spec) 05/18/2018 138.3  mg/Spec Final    Urine Volume 05/18/2018 1750  mL Final    Urine Collection Duration 05/18/2018 24  Hr Final    Sodium, Urine 05/18/2018 38  20 - 250 mmol/L Final    Sodium, Timed Ur 05/18/2018 2.8  2.0 - 9.0 mmol/Hr Final    NA, UR mmol/spec 05/18/2018 67  mmol/Spec Final     Assessment:       1. Chronic midline low back pain without sciatica        Plan:   Orthopedic evaluation for possible lumbar injections.      Chronic midline low back pain without sciatica  -     Ambulatory referral to Orthopedics

## 2018-06-18 NOTE — TELEPHONE ENCOUNTER
Spoke with pt regarding annual visit, she has been working with her nephrologist and PCP for kidney stones.    She will get her fasting labs drawn this Wednesday.

## 2018-06-20 ENCOUNTER — LAB VISIT (OUTPATIENT)
Dept: LAB | Facility: HOSPITAL | Age: 63
End: 2018-06-20
Attending: INTERNAL MEDICINE
Payer: MEDICARE

## 2018-06-20 DIAGNOSIS — E78.5 HYPERLIPIDEMIA, UNSPECIFIED HYPERLIPIDEMIA TYPE: ICD-10-CM

## 2018-06-20 DIAGNOSIS — Z94.1 STATUS POST HEART TRANSPLANT: ICD-10-CM

## 2018-06-20 DIAGNOSIS — Z79.52 LONG TERM CURRENT USE OF SYSTEMIC STEROIDS: ICD-10-CM

## 2018-06-20 DIAGNOSIS — T86.20 COMPLICATION OF HEART TRANSPLANT, UNSPECIFIED COMPLICATION: ICD-10-CM

## 2018-07-02 ENCOUNTER — OFFICE VISIT (OUTPATIENT)
Dept: UROLOGY | Facility: CLINIC | Age: 63
End: 2018-07-02
Payer: MEDICARE

## 2018-07-02 VITALS
WEIGHT: 141.31 LBS | BODY MASS INDEX: 25.85 KG/M2 | HEART RATE: 78 BPM | SYSTOLIC BLOOD PRESSURE: 118 MMHG | DIASTOLIC BLOOD PRESSURE: 98 MMHG

## 2018-07-02 DIAGNOSIS — M54.9 BACK PAIN, UNSPECIFIED BACK LOCATION, UNSPECIFIED BACK PAIN LATERALITY, UNSPECIFIED CHRONICITY: ICD-10-CM

## 2018-07-02 DIAGNOSIS — K59.00 CONSTIPATION, UNSPECIFIED CONSTIPATION TYPE: Primary | ICD-10-CM

## 2018-07-02 PROCEDURE — 3080F DIAST BP >= 90 MM HG: CPT | Mod: CPTII,S$GLB,, | Performed by: UROLOGY

## 2018-07-02 PROCEDURE — 3008F BODY MASS INDEX DOCD: CPT | Mod: CPTII,S$GLB,, | Performed by: UROLOGY

## 2018-07-02 PROCEDURE — 99999 PR PBB SHADOW E&M-EST. PATIENT-LVL II: CPT | Mod: PBBFAC,,, | Performed by: UROLOGY

## 2018-07-02 PROCEDURE — 99213 OFFICE O/P EST LOW 20 MIN: CPT | Mod: S$GLB,,, | Performed by: UROLOGY

## 2018-07-02 PROCEDURE — 3074F SYST BP LT 130 MM HG: CPT | Mod: CPTII,S$GLB,, | Performed by: UROLOGY

## 2018-07-02 NOTE — PROGRESS NOTES
Chief Complaint: No Urolithiasis    HPI:   7/2/18: Ct RSS shows no stones at all, her pain is not  related.  Constipatino managed with miralax.  PT has helped with back pain.  5/17/18: 63 yo woman worked by Dr. Crawford recently found on US to have 1 cm upper and lower pole right renal stones.  There is no hydronephrosis.  Pain is right side, over right hip pocket to sacrum.   Pain is positional and other positions make it better.  Stones not visible on KUB.  Urine pH has been 6-8 on recent evaluations.  No abd/pelvic pain and no exac/rel factors.  No gross hematuria.  No prior urolithiasis.  No urinary bother.  No  history.      Allergies:  Lisinopril; Atorvastatin; and Hydrocodone    Medications: has a current medication list which includes the following prescription(s): acetaminophen-codeine 300-30mg, alprazolam, amlodipine, ammonium lactate, aspirin, baclofen, biotin, buspirone, cyclosporine modified (neoral), cyclosporine modified (neoral), docusate sodium, duloxetine, estradiol, evening primrose oil, fluticasone, hydralazine, lyrica, metoprolol succinate, mometasone, multivitamin, omeprazole, temazepam, vitamin e, and zolpidem.    Review of Systems:  General: No fever, chills, fatigability, or weight loss.  Skin: No rashes, itching, or changes in color or texture of skin.  Chest: Denies DONOHUE, cyanosis, wheezing, cough, and sputum production.  Abdomen: Appetite fine. No weight loss. Denies diarrhea, abdominal pain, hematemesis, or blood in stool.  Musculoskeletal: No joint stiffness or swelling. Denies back pain.  : As above.  All other review of systems negative.    PMH:   has a past medical history of Anxiety; Arthritis; Chronic kidney disease; Chronic midline low back pain without sciatica (6/18/2018); Coronary artery disease; Depression; Fibromyalgia; Heart failure; Heart transplanted (1993); Hypertension; Immune disorder; Iron deficiency anemia (8/15/2017); Obesity; Shingles (2003 approx); Trouble in  sleeping; and Urinary incontinence.    PSH:   has a past surgical history that includes Cardiac pacemaker removal (Left, 06/26/14); Bladder surgery (2015 approx); Heart transplant (1993); Carpal tunnel release (Left, 03/03/15); Hysterectomy (1983); Hernia repair (Right, 1971 approx); Breast surgery (Left, 9/28/15); and Breast surgery (Right, 12/2015).    FamHx: family history includes Breast cancer in her mother; Cancer (age of onset: 38) in her mother; Cataracts in her cousin; Heart disease in her maternal grandmother; Hypertension in her son.    SocHx:  reports that she has never smoked. She has never used smokeless tobacco. She reports that she does not drink alcohol or use drugs.     Physical Exam:  Vitals:   Vitals:    07/02/18 1505   BP: (!) 118/98   Pulse: 78     General: A&Ox3. No apparent distress. No deformities.  Neck: No masses. Normal thyroid.  Lungs: normal inspiration. No use of accessory muscles.  Heart: normal pulse. No arrhythmias.  Abdomen: Soft. NT. ND  Skin: The skin is warm and dry. No jaundice.  Ext: No c/c/e.  : deferred    Labs/Studies:     Impression/Plan:   1. No stones or other  abnormality despite US findings to contrary.

## 2018-07-05 ENCOUNTER — PES CALL (OUTPATIENT)
Dept: ADMINISTRATIVE | Facility: CLINIC | Age: 63
End: 2018-07-05

## 2018-07-18 ENCOUNTER — TELEPHONE (OUTPATIENT)
Dept: TRANSPLANT | Facility: CLINIC | Age: 63
End: 2018-07-18

## 2018-07-18 PROBLEM — Z91.89 AT HIGH RISK FOR BREAST CANCER: Status: ACTIVE | Noted: 2018-07-18

## 2018-07-18 NOTE — TELEPHONE ENCOUNTER
Spoke with this morning, she is feeling better, has had a difficult since her son passed away, the mother of his daughter moved away and she does not know where they live, she just knows somewhere in [place].    She is feeling better and is ready to be scheduled for annual, will try and schedule in [place] to make it easier on pt.

## 2018-07-19 ENCOUNTER — OFFICE VISIT (OUTPATIENT)
Dept: INTERNAL MEDICINE | Facility: CLINIC | Age: 63
End: 2018-07-19
Payer: MEDICARE

## 2018-07-19 VITALS
WEIGHT: 142 LBS | BODY MASS INDEX: 26.13 KG/M2 | HEIGHT: 62 IN | HEART RATE: 93 BPM | OXYGEN SATURATION: 98 % | DIASTOLIC BLOOD PRESSURE: 66 MMHG | SYSTOLIC BLOOD PRESSURE: 114 MMHG

## 2018-07-19 DIAGNOSIS — Z94.1 HEART TRANSPLANTED: Chronic | ICD-10-CM

## 2018-07-19 DIAGNOSIS — K21.9 GASTROESOPHAGEAL REFLUX DISEASE, ESOPHAGITIS PRESENCE NOT SPECIFIED: ICD-10-CM

## 2018-07-19 DIAGNOSIS — G47.00 INSOMNIA, UNSPECIFIED TYPE: ICD-10-CM

## 2018-07-19 DIAGNOSIS — Z91.89 AT HIGH RISK FOR BREAST CANCER: ICD-10-CM

## 2018-07-19 DIAGNOSIS — D84.9 IMMUNOCOMPROMISED PATIENT: ICD-10-CM

## 2018-07-19 DIAGNOSIS — I10 ESSENTIAL HYPERTENSION: ICD-10-CM

## 2018-07-19 DIAGNOSIS — F41.9 ANXIETY: Chronic | ICD-10-CM

## 2018-07-19 DIAGNOSIS — I70.0 AORTIC ATHEROSCLEROSIS: ICD-10-CM

## 2018-07-19 DIAGNOSIS — D64.9 ANEMIA, UNSPECIFIED TYPE: ICD-10-CM

## 2018-07-19 DIAGNOSIS — R25.2 LEG CRAMPS: ICD-10-CM

## 2018-07-19 DIAGNOSIS — F32.9 MAJOR DEPRESSIVE DISORDER WITH SINGLE EPISODE, REMISSION STATUS UNSPECIFIED: ICD-10-CM

## 2018-07-19 DIAGNOSIS — M79.7 FIBROMYALGIA: ICD-10-CM

## 2018-07-19 DIAGNOSIS — Z00.00 ENCOUNTER FOR PREVENTIVE HEALTH EXAMINATION: Primary | ICD-10-CM

## 2018-07-19 DIAGNOSIS — N18.30 CKD (CHRONIC KIDNEY DISEASE) STAGE 3, GFR 30-59 ML/MIN: ICD-10-CM

## 2018-07-19 DIAGNOSIS — R79.89 ELEVATED PARATHYROID HORMONE: ICD-10-CM

## 2018-07-19 DIAGNOSIS — Z91.89 POTENTIAL FOR COGNITIVE IMPAIRMENT: ICD-10-CM

## 2018-07-19 PROBLEM — Z86.39 HISTORY OF HYPERPARATHYROIDISM: Status: ACTIVE | Noted: 2017-11-17

## 2018-07-19 PROCEDURE — 3078F DIAST BP <80 MM HG: CPT | Mod: CPTII,S$GLB,, | Performed by: NURSE PRACTITIONER

## 2018-07-19 PROCEDURE — 99999 PR PBB SHADOW E&M-EST. PATIENT-LVL V: CPT | Mod: PBBFAC,,, | Performed by: NURSE PRACTITIONER

## 2018-07-19 PROCEDURE — G0439 PPPS, SUBSEQ VISIT: HCPCS | Mod: S$GLB,,, | Performed by: NURSE PRACTITIONER

## 2018-07-19 PROCEDURE — 99499 UNLISTED E&M SERVICE: CPT | Mod: HCNC,S$GLB,, | Performed by: NURSE PRACTITIONER

## 2018-07-19 PROCEDURE — 3074F SYST BP LT 130 MM HG: CPT | Mod: CPTII,S$GLB,, | Performed by: NURSE PRACTITIONER

## 2018-07-19 NOTE — PATIENT INSTRUCTIONS
Counseling and Referral of Other Preventative  (Italic type indicates deductible and co-insurance are waived)    Patient Name: Nadia Damon  Today's Date: 7/19/2018    Health Maintenance       Date Due Completion Date    TETANUS VACCINE 07/16/1972 ---    Pneumococcal PPSV23 (High Risk) (1) 04/27/2017 ---    Influenza Vaccine 08/01/2018 10/16/2017 (Done)    Override on 10/16/2017: Done    Override on 10/12/2016: Done    Override on 11/2/2015: Done (approx date elsewhere)    Mammogram 03/27/2019 3/27/2018 (Done)    Override on 3/27/2018: Done    Override on 2/24/2016: Done (folow up at [facility])    Lipid Panel 06/20/2019 6/20/2018    Colonoscopy 02/03/2024 2/3/2014 (Done)    Override on 2/3/2014: Done (had brbpr  approx date  at [facility])        No orders of the defined types were placed in this encounter.    The following information is provided to all patients.  This information is to help you find resources for any of the problems found today that may be affecting your health:                Living healthy guide: www.[facility].[place].gov      Understanding Diabetes: www.diabetes.org      Eating healthy: www.cdc.gov/healthyweight      CDC home safety checklist: www.cdc.gov/steadi/patient.html      Agency on Aging: www.goea.[place].[facility]      Alcoholics anonymous (AA): www.aa.org      Physical Activity: www.thea.nih.gov/xw3ksve      Tobacco use: www.quitwithusla.org

## 2018-07-19 NOTE — PROGRESS NOTES
"Nadia Damon presented for a  Medicare AWV and comprehensive Health Risk Assessment today. The following components were reviewed and updated:    · Medical history  · Family History  · Social history  · Allergies and Current Medications  · Health Risk Assessment  · Health Maintenance  · Care Team     ** See Completed Assessments for Annual Wellness Visit within the encounter summary.**       The following assessments were completed:  · Living Situation  · CAGE  · Depression Screening  · Timed Get Up and Go  · Whisper Test  · Cognitive Function Screening  · Nutrition Screening  · ADL Screening  · PAQ Screening    Vitals:    07/19/18 1447   BP: 114/66   Pulse: 93   SpO2: 98%   Weight: 64.4 kg (141 lb 15.6 oz)   Height: 5' 1.61" (1.565 m)     Body mass index is 26.29 kg/m².  Physical Exam   Constitutional: She is oriented to person, place, and time. She appears well-developed and well-nourished.   HENT:   Head: Normocephalic.   Cardiovascular: Normal rate, regular rhythm and normal heart sounds.    No murmur heard.  Pulmonary/Chest: Effort normal and breath sounds normal. No respiratory distress.   Abdominal: Soft. She exhibits no mass. There is tenderness.       Musculoskeletal: Normal range of motion.   1+ edema noted to bilateral lower extremities (R>L). No erythema, increased warmth, or tenderness noted to either calf. Reports right is always more swollen than left. Reports edema resolves with elevation and compression stockings. Reports her cardiologist is aware of edema.    Neurological: She is alert and oriented to person, place, and time. She exhibits normal muscle tone.   Skin: Skin is warm, dry and intact.   Psychiatric: She has a normal mood and affect. Her speech is normal and behavior is normal.   Nursing note and vitals reviewed.        Diagnoses and health risks identified today and associated recommendations/orders:    1. Encounter for preventive health examination    Discussed s/s of MI, stroke, " heart failure, and DVT (patient denies any s/s) and advised to go to the ER if occur. She expressed understanding.     2. Heart transplanted  Per patient 5/1993. Reports at that time placed a defibrillator which later in 2014 had to be removed.   Continue current treatment plan as previously prescribed with your transplant team (Dr. Tubbs).      3. Essential hypertension  Stable and controlled. Continue current treatment plan as previously prescribed with your PCP and outside cardiologist, Dr. Lopez.     4. Aortic atherosclerosis  CXR  8/8/2016---Aortic atherosclerosis.  Discussed diagnosis and risk reduction.   Advised to follow up with PCP and outside cardiologist for further recommendations. She expressed understanding.      5. Immunocompromised patient  On cyclosporine.   Continue current treatment plan as previously prescribed with your transplant team (Dr. Tubbs).      6. CKD (chronic kidney disease) stage 3, GFR 30-59 ml/min  US Retro   5/16/2018---Mild cortical thinning and increased cortical echogenicity.  Findings can be seen with medical renal disease.  8/31/216--- Echogenic kidneys with diffuse cortical thinning suggesting  medical renal disease. 2 complex right renal cortical cysts.  Advised to avoid NSAIDs.   GFR decreased from prior check. Continue current treatment plan as previously prescribed with your PCP and nephrologist, Dr. Crawford.   She is going to call to schedule an appointment with Dr. Crawford, as she is overdue for follow up.     7. Elevated parathyroid hormone  No recent check.   Advised to follow up with PCP and nephrologist for further monitoring and recommendations. She expressed understanding.      8. Anemia, unspecified type  Denies hemoptysis, hematemesis, epistaxis, hematuria, hematochezia, or melena.    Advised to follow up with PCP for further monitoring and recommendations. She expressed understanding.      9. Major depressive disorder with single episode, remission status  "unspecified  Reports she has been dealing with depression for years and has been on antidepressants for that time. Reports she feels depression is stable on Cymbalta.   PHQ 2-2. She reports this is due to the fact that the mother of her granddaughter has moved them to "somewhere in [place]". This is the child of her  son.   Denies SI.   Discussed the importance of not abruptly stopping medication to first consult with PCP. She expressed understanding.    Discussed counseling. Contact information for psychiatry department given to patient.   Advised to follow up with PCP and psychiatry department for further evaluation and treatment. She expressed understanding.      10. Anxiety  Per patient stable on daily Buspar and prn Xanax.   Stable. Continue current treatment plan as previously prescribed with your PCP.     11. Potential for cognitive impairment  Abnormal cognitive function screening (4)  Denies memory difficulties.   Advised to follow up with PCP for further evaluation and recommendations. She expressed understanding.      12. Gastroesophageal reflux disease, esophagitis presence not specified  CT Renal 2018---   Small hiatal hernia.  Per patient INES is stable and controlled on Prilosec.   Stable and controlled. Continue current treatment plan as previously prescribed with your PCP.      13. Insomnia, unspecified type  Reports stable and controlled on Restoril and Ambien. Reports her PCP is aware that she is taking both medications.   Stable and controlled. Continue current treatment plan as previously prescribed with your PCP.     14. Fibromyalgia  Per patient stable on Lyrica.   Stable. Continue current treatment plan as previously prescribed with your PCP.     15. At high risk for breast cancer  Per Dr. Soni's office visit note dated 3/22/2018---"...Assessment and plan: High-risk breast cancer lesion with atypical ductal hyperplasia. Also has fibrocystic breast changes and diffuse cystic " "mastopathy. Patient needs bilateral breast mammogram now and every 6 months thereafter...."    16. Leg cramps  Reports leg cramps that began about 3 months ago. Reports her PCP is aware, but leg cramps are seemingly getting worse.   Advised to follow up with PCP for further evaluation and treatment. She expressed understanding.        Provided Nadia with a 5-10 year written screening schedule and personal prevention plan.  Education, counseling, and referrals were provided as needed. After Visit Summary printed and given to patient which includes a list of additional screenings\tests needed.    Follow-up in about 1 year (around 7/19/2019) for AWV.    Abbie Huerta NP  "

## 2018-07-19 NOTE — Clinical Note
Your patient was seen today for a HRA visit. Abnormalities (BOLDED) have been identified during this visit that may require additional testing and  follow up. I have included a copy of my visit note, please review the note and feel free to contact me with any questions.  Thank you for allowing me to participate in the care of your patients.  Abbie Huerta NP

## 2018-07-31 DIAGNOSIS — M79.7 FIBROMYALGIA: ICD-10-CM

## 2018-08-01 RX ORDER — DULOXETIN HYDROCHLORIDE 30 MG/1
30 CAPSULE, DELAYED RELEASE ORAL DAILY
Qty: 90 CAPSULE | Refills: 1 | Status: SHIPPED | OUTPATIENT
Start: 2018-08-01 | End: 2018-12-06 | Stop reason: SDUPTHER

## 2018-08-01 RX ORDER — BUSPIRONE HYDROCHLORIDE 10 MG/1
10 TABLET ORAL DAILY PRN
Qty: 90 TABLET | Refills: 0 | Status: SHIPPED | OUTPATIENT
Start: 2018-08-01 | End: 2018-12-06 | Stop reason: SDUPTHER

## 2018-08-07 ENCOUNTER — TELEPHONE (OUTPATIENT)
Dept: NEPHROLOGY | Facility: CLINIC | Age: 63
End: 2018-08-07

## 2018-08-07 DIAGNOSIS — I10 ESSENTIAL HYPERTENSION: Primary | ICD-10-CM

## 2018-08-07 NOTE — TELEPHONE ENCOUNTER
S/W pt Scheduled appointment with Dr Crawford for August 8, 2018 at 10:30.  Pt verbalized understanding.  VB

## 2018-08-07 NOTE — TELEPHONE ENCOUNTER
----- Message from Shannon Kumar sent at 8/7/2018 10:51 AM CDT -----  Contact: Self-[phone]   Pt would like to consult with the nurse about being worked in for this Thursday, pt complaints of right side pain.  Please call back at [phone].  x-

## 2018-08-08 ENCOUNTER — LAB VISIT (OUTPATIENT)
Dept: LAB | Facility: HOSPITAL | Age: 63
End: 2018-08-08
Attending: INTERNAL MEDICINE
Payer: MEDICARE

## 2018-08-08 ENCOUNTER — OFFICE VISIT (OUTPATIENT)
Dept: NEPHROLOGY | Facility: CLINIC | Age: 63
End: 2018-08-08
Payer: MEDICARE

## 2018-08-08 VITALS
WEIGHT: 141.13 LBS | HEIGHT: 62 IN | BODY MASS INDEX: 25.97 KG/M2 | SYSTOLIC BLOOD PRESSURE: 130 MMHG | DIASTOLIC BLOOD PRESSURE: 80 MMHG | HEART RATE: 84 BPM

## 2018-08-08 DIAGNOSIS — Z71.89 ENCOUNTER FOR MEDICATION REVIEW AND COUNSELING: ICD-10-CM

## 2018-08-08 DIAGNOSIS — I10 ESSENTIAL HYPERTENSION: ICD-10-CM

## 2018-08-08 DIAGNOSIS — N20.0 NEPHROLITHIASIS: Primary | ICD-10-CM

## 2018-08-08 DIAGNOSIS — D84.9 IMMUNOSUPPRESSION: ICD-10-CM

## 2018-08-08 DIAGNOSIS — R82.991 HYPOCITRATURIA: ICD-10-CM

## 2018-08-08 DIAGNOSIS — N18.30 CHRONIC KIDNEY DISEASE, STAGE III (MODERATE): ICD-10-CM

## 2018-08-08 LAB
ANION GAP SERPL CALC-SCNC: 9 MMOL/L
BASOPHILS # BLD AUTO: 0.01 K/UL
BASOPHILS NFR BLD: 0.3 %
BUN SERPL-MCNC: 33 MG/DL
CALCIUM SERPL-MCNC: 9.4 MG/DL
CHLORIDE SERPL-SCNC: 105 MMOL/L
CO2 SERPL-SCNC: 27 MMOL/L
CREAT SERPL-MCNC: 1.6 MG/DL
DIFFERENTIAL METHOD: ABNORMAL
EOSINOPHIL # BLD AUTO: 0.1 K/UL
EOSINOPHIL NFR BLD: 1.7 %
ERYTHROCYTE [DISTWIDTH] IN BLOOD BY AUTOMATED COUNT: 13.9 %
EST. GFR  (AFRICAN AMERICAN): 39 ML/MIN/1.73 M^2
EST. GFR  (NON AFRICAN AMERICAN): 34 ML/MIN/1.73 M^2
GLUCOSE SERPL-MCNC: 84 MG/DL
HCT VFR BLD AUTO: 33.4 %
HGB BLD-MCNC: 10.5 G/DL
LYMPHOCYTES # BLD AUTO: 1.2 K/UL
LYMPHOCYTES NFR BLD: 40.6 %
MCH RBC QN AUTO: 27.8 PG
MCHC RBC AUTO-ENTMCNC: 31.4 G/DL
MCV RBC AUTO: 88 FL
MONOCYTES # BLD AUTO: 0.4 K/UL
MONOCYTES NFR BLD: 12.8 %
NEUTROPHILS # BLD AUTO: 1.3 K/UL
NEUTROPHILS NFR BLD: 44.6 %
PLATELET # BLD AUTO: 238 K/UL
PMV BLD AUTO: 9 FL
POTASSIUM SERPL-SCNC: 5.1 MMOL/L
PTH-INTACT SERPL-MCNC: 183.2 PG/ML
RBC # BLD AUTO: 3.78 M/UL
SODIUM SERPL-SCNC: 141 MMOL/L
WBC # BLD AUTO: 2.88 K/UL

## 2018-08-08 PROCEDURE — 80048 BASIC METABOLIC PNL TOTAL CA: CPT

## 2018-08-08 PROCEDURE — 36415 COLL VENOUS BLD VENIPUNCTURE: CPT

## 2018-08-08 PROCEDURE — 99215 OFFICE O/P EST HI 40 MIN: CPT | Mod: S$GLB,,, | Performed by: INTERNAL MEDICINE

## 2018-08-08 PROCEDURE — 3079F DIAST BP 80-89 MM HG: CPT | Mod: CPTII,S$GLB,, | Performed by: INTERNAL MEDICINE

## 2018-08-08 PROCEDURE — 85025 COMPLETE CBC W/AUTO DIFF WBC: CPT

## 2018-08-08 PROCEDURE — 3075F SYST BP GE 130 - 139MM HG: CPT | Mod: CPTII,S$GLB,, | Performed by: INTERNAL MEDICINE

## 2018-08-08 PROCEDURE — 3008F BODY MASS INDEX DOCD: CPT | Mod: CPTII,S$GLB,, | Performed by: INTERNAL MEDICINE

## 2018-08-08 PROCEDURE — 99499 UNLISTED E&M SERVICE: CPT | Mod: HCNC,S$GLB,, | Performed by: INTERNAL MEDICINE

## 2018-08-08 PROCEDURE — 83970 ASSAY OF PARATHORMONE: CPT

## 2018-08-08 PROCEDURE — 99999 PR PBB SHADOW E&M-EST. PATIENT-LVL III: CPT | Mod: PBBFAC,,, | Performed by: INTERNAL MEDICINE

## 2018-08-08 NOTE — PROGRESS NOTES
NEPHROLOGY CLINIC FOLLOWUP NOTE  Date of clinic visit: 8/8/18     REASON FOR FOLLOWUP AND CHIEF COMPLAINT: right sided back pain/nephrolithiasis and history of chronic kidney disease post-heart transplant.     HISTORY OF PRESENT ILLNESS: Ms. Damon is a 63-year-old  female with a history of CKD and heart transplant in 1993, who presents for f/u of above. Pt previously developed right sided back pain, thought to be related to kidney stones identified on renal u/s. The stones in the R kidney were non-obstructive but were relatively large at 10 and 12 mm each. Based on h/o of taking cyclosporine to prevent heart transplant rejection and eating a lot of sea food (shrimp and crawfish), uric acid stones were suspected. Pt had additional risk factors for non-uric acid stones, including taking Vit C 1 g/day and Vit D. Pt was advised to stop Vit C and Vit D, was advised on drinking freshly squeezed lemon juice and follow dietary recommendations to avoid nephrolithiasis risk factors. Pt has followed above advice closely. Pt was referred also to urology. A repeat CT scan did not show any stones. Pt obtained a 24 hour urine collection for kidney stone risk stratification.    Pt presents for f/u. Pt has no new c/o's, no abd pain, no fever, no burning of pain on urination. Pt still has te same pain (same pattern) in right mid back. Pain is worse on bending and pushing on the area.        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 However, pt has not had a kidney biopsy. She also has a h/o of HTN.        PAST MEDICAL HISTORY:  1. CKD stage III.  2. Hypertension.  3. Heart transplant for cardiomyopathy in 1993, the patient has been on chronic  cyclosporine treatment.  4. Carpal tunnel syndrome.  5. Fibromyalgia.  6. GERD.  7. Bell's palsy.  8. Depression.     PAST SURGICAL HISTORY: Reviewed as above, unchanged.     MEDICATIONS: Reviewed, as per  previous note, unchanged.     SOCIAL HISTORY: Reviewed. Negative for smoking. No alcohol use.     MEDICATIONS: Reviewed. Amlodipine 5 mg po qd, Toprol-XL 25 mg p.o. daily. Hydralazine 100 mg tid, s/p HCTZ 12.5 mg daily. Other medications were   carefully reviewed and noted and discussed with the patient.     REVIEW OF SYSTEMS: No recent hospitalizations.  GENERAL: Negative.  HEAD, EYES, EARS, NOSE, AND THROAT: Negative.  CARDIAC: Negative.  PULMONARY: Negative.  GASTROINTESTINAL: Negative.  GENITOURINARY: Negative.  PSYCHOLOGICAL: Negative.  NEUROLOGICAL: Negative.  ENDOCRINE: Negative.  HEMATOLOGIC AND ONCOLOGIC: Negative.  INFECTIOUS DISEASE: Negative.  The rest of the review of systems negative.     PHYSICAL EXAMINATION:  VITAL SIGNS: Repeat blood pressure is 130/80. Pulse is 84, weight is 64.0 Kg  GENERAL: She is cooperative, pleasant, in no acute distress.   Speech and thought process appropriate, normal.  HEENT: Mucous membranes moist.  NECK: Neck JVD. Normal hearing.  HEART: Regular rate and rhythm, S1 and S2 audible. No rubs.  CHEST: Clear to auscultation. No rales or wheezes. Breathing symmetric, unlabored.  ABDOMEN: Soft, nontender.  Side and back: mid back, non tender, no sign of trauma  EXTREMITIES: no edema.     LABS: Reviewed. No recent labs.  BMP  Lab Results   Component Value Date     08/08/2018    K 5.1 08/08/2018     08/08/2018    CO2 27 08/08/2018    BUN 33 (H) 08/08/2018    CREATININE 1.6 (H) 08/08/2018    CALCIUM 9.4 08/08/2018    ANIONGAP 9 08/08/2018    ESTGFRAFRICA 39 (A) 08/08/2018    EGFRNONAA 34 (A) 08/08/2018     Lab Results   Component Value Date    WBC 2.88 (L) 08/08/2018    HGB 10.5 (L) 08/08/2018    HCT 33.4 (L) 08/08/2018    MCV 88 08/08/2018     08/08/2018     Lab Results   Component Value Date    .2 (H) 08/08/2018    CALCIUM 9.4 08/08/2018       24 hour urine: Ca not measured, uric acid 138, Oxalate 20, citrate 203  U/a unremarkable  Urine Cx: neg   Urine  P/Cr 740 mg     KUB: unremarkable, except for some constipation     Renal u/s: FINDINGS:  Right kidney: The right kidney measures 9.6 cm. Mild cortical thinning and increased cortical echogenicity.  10 and 12 mm stones are seen in the upper and lower poles respectively.  No cortical thinning. No loss of corticomedullary distinction. No mass. No renal stone. No hydronephrosis.  Left kidney: The left kidney measures 9.3 cm. Mild cortical thinning and increased cortical echogenicity. No loss of corticomedullary distinction. No mass. No renal stone. No hydronephrosis.  The bladder is partially distended at the time of scanning and has an unremarkable appearance.     CT abd: The left kidney appears slightly small.  Right kidney is grossly normal in size.  No obvious hydronephrosis or nephrolithiasis        ASSESSMENT AND PLAN: This is a 62-year-old female who presents for follow up of CKD and has new and persistent subacute right sided pain in her mid back  The patient has post-heart transplant, chronic kidney disease and proteinuria.   The impression is as follows:     1. Right sided mid back pain, still present  U/s earlier showed non-obstructive 2 R stones, relatively large  Pt has multiple risk factors for both uric acid and Ca Oxalate stones  Pt modified life style and diet, and used freshly squeezed lemon juice  F/u CT does not show any stones.  Did pt pass the stones? Uric acid stones are relatively easily soluble with alkali  24 hour urine for kidney stone stratification shows hypocitraturia  Pt was advised in laymas's language  Continue to drink freshly squeezes lemon juice  If can not do above, will give K Citrate  Pt was advised NOT TO STOP cyclosporine.     Pt was again advised of dietary risk factors for kidney stones, was given printed literature  Advised pt to:  -increase water intake to >2 L/day  -reduce salt in diet to <3 g/day  -reduce meat intake, specially seafood  -keep Ca intake average  -d/c Vit  C  -d/c Vit D     2. Renal. 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 HTN: BP controlled  Reviewed meds with pt     4. History of heart transplant on cyclosporine.  Per heart transplant team.     5. Proteinuria. Mild. Less than 1 g per day  Repeat is lower 740 mg.  Would benefit from an ACE inhibitor or angiotensin receptor blocker.   Will look at K on pending labs    6. Right mid back pain: not related urologically  Likely musculoskeletal       PLANS AND RECOMMENDATIONS:   As discussed above  RTC 3 months  Total time spent 40 minutes. Multiple issues addressed  Extensive time spent including the time to review the records, the patient evaluation, documentation, face-to-face  discussion with the patient. More than 50% of the time was spent in counseling  and coordination of care. Level V visit.     Carter Crawford MD

## 2018-08-13 DIAGNOSIS — F51.01 PRIMARY INSOMNIA: ICD-10-CM

## 2018-08-14 RX ORDER — TEMAZEPAM 30 MG/1
CAPSULE ORAL
Qty: 90 CAPSULE | Refills: 1 | Status: SHIPPED | OUTPATIENT
Start: 2018-08-14 | End: 2019-02-17 | Stop reason: SDUPTHER

## 2018-09-04 ENCOUNTER — TELEPHONE (OUTPATIENT)
Dept: TRANSPLANT | Facility: CLINIC | Age: 63
End: 2018-09-04

## 2018-09-04 ENCOUNTER — CLINICAL SUPPORT (OUTPATIENT)
Dept: CARDIOLOGY | Facility: CLINIC | Age: 63
End: 2018-09-04
Attending: INTERNAL MEDICINE
Payer: MEDICARE

## 2018-09-04 DIAGNOSIS — Z94.1 STATUS POST HEART TRANSPLANT: ICD-10-CM

## 2018-09-04 DIAGNOSIS — Z79.899 ENCOUNTER FOR LONG-TERM (CURRENT) USE OF MEDICATIONS: ICD-10-CM

## 2018-09-04 DIAGNOSIS — E78.2 MIXED HYPERLIPIDEMIA: ICD-10-CM

## 2018-09-04 DIAGNOSIS — T86.20 COMPLICATION OF HEART TRANSPLANT, UNSPECIFIED COMPLICATION: ICD-10-CM

## 2018-09-04 DIAGNOSIS — I10 ESSENTIAL HYPERTENSION: ICD-10-CM

## 2018-09-04 DIAGNOSIS — R79.89 HIGH SERUM PARATHYROID HORMONE (PTH): Primary | ICD-10-CM

## 2018-09-04 PROCEDURE — 93325 DOPPLER ECHO COLOR FLOW MAPG: CPT | Mod: PBBFAC | Performed by: INTERNAL MEDICINE

## 2018-09-04 PROCEDURE — 93351 STRESS TTE COMPLETE: CPT | Mod: 26,S$PBB,, | Performed by: INTERNAL MEDICINE

## 2018-09-04 PROCEDURE — 93320 DOPPLER ECHO COMPLETE: CPT | Mod: 26,S$PBB,, | Performed by: INTERNAL MEDICINE

## 2018-09-05 ENCOUNTER — LAB VISIT (OUTPATIENT)
Dept: LAB | Facility: HOSPITAL | Age: 63
End: 2018-09-05
Attending: INTERNAL MEDICINE
Payer: MEDICARE

## 2018-09-05 ENCOUNTER — TELEPHONE (OUTPATIENT)
Dept: TRANSPLANT | Facility: CLINIC | Age: 63
End: 2018-09-05

## 2018-09-05 DIAGNOSIS — Z79.899 ENCOUNTER FOR LONG-TERM (CURRENT) USE OF MEDICATIONS: ICD-10-CM

## 2018-09-05 DIAGNOSIS — Z94.1 STATUS POST HEART TRANSPLANT: ICD-10-CM

## 2018-09-05 DIAGNOSIS — R79.89 HIGH SERUM PARATHYROID HORMONE (PTH): ICD-10-CM

## 2018-09-05 DIAGNOSIS — Z79.52 LONG TERM CURRENT USE OF SYSTEMIC STEROIDS: ICD-10-CM

## 2018-09-05 DIAGNOSIS — T86.20 COMPLICATION OF HEART TRANSPLANT, UNSPECIFIED COMPLICATION: ICD-10-CM

## 2018-09-05 DIAGNOSIS — E87.5 HYPERKALEMIA: Primary | ICD-10-CM

## 2018-09-05 DIAGNOSIS — Z79.60 LONG-TERM USE OF IMMUNOSUPPRESSANT MEDICATION: ICD-10-CM

## 2018-09-05 LAB
ALBUMIN SERPL BCP-MCNC: 3.5 G/DL
ALP SERPL-CCNC: 117 U/L
ALT SERPL W/O P-5'-P-CCNC: 38 U/L
ANION GAP SERPL CALC-SCNC: 7 MMOL/L
AST SERPL-CCNC: 43 U/L
BILIRUB SERPL-MCNC: 0.6 MG/DL
BUN SERPL-MCNC: 42 MG/DL
CA-I BLDV-SCNC: 1.13 MMOL/L
CALCIUM SERPL-MCNC: 9.6 MG/DL
CHLORIDE SERPL-SCNC: 101 MMOL/L
CO2 SERPL-SCNC: 29 MMOL/L
CREAT SERPL-MCNC: 1.5 MG/DL
EST. GFR  (AFRICAN AMERICAN): 42.1 ML/MIN/1.73 M^2
EST. GFR  (NON AFRICAN AMERICAN): 36.6 ML/MIN/1.73 M^2
GLUCOSE SERPL-MCNC: 74 MG/DL
POTASSIUM SERPL-SCNC: 5.7 MMOL/L
PROT SERPL-MCNC: 8.5 G/DL
SODIUM SERPL-SCNC: 137 MMOL/L

## 2018-09-05 PROCEDURE — 82330 ASSAY OF CALCIUM: CPT

## 2018-09-05 PROCEDURE — 80053 COMPREHEN METABOLIC PANEL: CPT

## 2018-09-05 PROCEDURE — 36415 COLL VENOUS BLD VENIPUNCTURE: CPT

## 2018-09-06 LAB
AORTIC VALVE REGURGITATION: NORMAL
DIASTOLIC DYSFUNCTION: NO
ESTIMATED PA SYSTOLIC PRESSURE: 17.64
RETIRED EF AND QEF - SEE NOTES: 60 (ref 55–65)
TRICUSPID VALVE REGURGITATION: NORMAL

## 2018-09-06 NOTE — TELEPHONE ENCOUNTER
Pt's potassium was 5.7 Yesterday, spoke with pt and she was going to go to ER and have it rechecked on Wednesday 9/5/18.

## 2018-09-07 ENCOUNTER — LAB VISIT (OUTPATIENT)
Dept: LAB | Facility: HOSPITAL | Age: 63
End: 2018-09-07
Attending: INTERNAL MEDICINE
Payer: MEDICARE

## 2018-09-07 DIAGNOSIS — Z94.1 STATUS POST HEART TRANSPLANT: ICD-10-CM

## 2018-09-07 DIAGNOSIS — E87.5 HYPERKALEMIA: ICD-10-CM

## 2018-09-07 DIAGNOSIS — Z79.60 LONG-TERM USE OF IMMUNOSUPPRESSANT MEDICATION: ICD-10-CM

## 2018-09-07 LAB
ANION GAP SERPL CALC-SCNC: 7 MMOL/L
BUN SERPL-MCNC: 34 MG/DL
CALCIUM SERPL-MCNC: 9.5 MG/DL
CHLORIDE SERPL-SCNC: 103 MMOL/L
CO2 SERPL-SCNC: 29 MMOL/L
CREAT SERPL-MCNC: 1.6 MG/DL
EST. GFR  (AFRICAN AMERICAN): 39 ML/MIN/1.73 M^2
EST. GFR  (NON AFRICAN AMERICAN): 33.8 ML/MIN/1.73 M^2
GLUCOSE SERPL-MCNC: 85 MG/DL
POTASSIUM SERPL-SCNC: 5.5 MMOL/L
SODIUM SERPL-SCNC: 139 MMOL/L

## 2018-09-07 PROCEDURE — 36415 COLL VENOUS BLD VENIPUNCTURE: CPT

## 2018-09-07 PROCEDURE — 80048 BASIC METABOLIC PNL TOTAL CA: CPT

## 2018-09-07 NOTE — TELEPHONE ENCOUNTER
Spoke with pt this morning, she states she had not gone to ED, she remembered she drank some sports drinks at home and has not since Wednesday, I asked her to repeat labs today at [facility]. Pt will try.

## 2018-09-11 ENCOUNTER — TELEPHONE (OUTPATIENT)
Dept: TRANSPLANT | Facility: CLINIC | Age: 63
End: 2018-09-11

## 2018-09-11 DIAGNOSIS — R79.89 ELEVATED PTHRP LEVEL: Primary | ICD-10-CM

## 2018-09-12 ENCOUNTER — LAB VISIT (OUTPATIENT)
Dept: LAB | Facility: HOSPITAL | Age: 63
End: 2018-09-12
Attending: INTERNAL MEDICINE
Payer: MEDICARE

## 2018-09-12 DIAGNOSIS — Z79.52 LONG TERM CURRENT USE OF SYSTEMIC STEROIDS: ICD-10-CM

## 2018-09-12 DIAGNOSIS — Z79.899 ENCOUNTER FOR LONG-TERM (CURRENT) USE OF MEDICATIONS: ICD-10-CM

## 2018-09-12 DIAGNOSIS — Z94.1 STATUS POST HEART TRANSPLANT: ICD-10-CM

## 2018-09-12 DIAGNOSIS — T86.20 COMPLICATION OF HEART TRANSPLANT, UNSPECIFIED COMPLICATION: ICD-10-CM

## 2018-09-12 LAB
ALBUMIN SERPL BCP-MCNC: 3.3 G/DL
ALP SERPL-CCNC: 107 U/L
ALT SERPL W/O P-5'-P-CCNC: 37 U/L
ANION GAP SERPL CALC-SCNC: 7 MMOL/L
AST SERPL-CCNC: 38 U/L
BASOPHILS # BLD AUTO: 0.02 K/UL
BASOPHILS NFR BLD: 0.5 %
BILIRUB SERPL-MCNC: 0.6 MG/DL
BNP SERPL-MCNC: 191 PG/ML
BUN SERPL-MCNC: 33 MG/DL
CALCIUM SERPL-MCNC: 9.6 MG/DL
CHLORIDE SERPL-SCNC: 105 MMOL/L
CO2 SERPL-SCNC: 29 MMOL/L
CREAT SERPL-MCNC: 1.6 MG/DL
DIFFERENTIAL METHOD: ABNORMAL
EOSINOPHIL # BLD AUTO: 0 K/UL
EOSINOPHIL NFR BLD: 1 %
ERYTHROCYTE [DISTWIDTH] IN BLOOD BY AUTOMATED COUNT: 15.1 %
EST. GFR  (AFRICAN AMERICAN): 39 ML/MIN/1.73 M^2
EST. GFR  (NON AFRICAN AMERICAN): 33.8 ML/MIN/1.73 M^2
GLUCOSE SERPL-MCNC: 84 MG/DL
HCT VFR BLD AUTO: 33.8 %
HGB BLD-MCNC: 10.5 G/DL
IMM GRANULOCYTES # BLD AUTO: 0.01 K/UL
IMM GRANULOCYTES NFR BLD AUTO: 0.3 %
LYMPHOCYTES # BLD AUTO: 1.4 K/UL
LYMPHOCYTES NFR BLD: 34.6 %
MCH RBC QN AUTO: 27.9 PG
MCHC RBC AUTO-ENTMCNC: 31.1 G/DL
MCV RBC AUTO: 90 FL
MONOCYTES # BLD AUTO: 0.4 K/UL
MONOCYTES NFR BLD: 10.2 %
NEUTROPHILS # BLD AUTO: 2.1 K/UL
NEUTROPHILS NFR BLD: 53.4 %
NRBC BLD-RTO: 0 /100 WBC
PLATELET # BLD AUTO: 257 K/UL
PMV BLD AUTO: 9.5 FL
POTASSIUM SERPL-SCNC: 5.7 MMOL/L
PROT SERPL-MCNC: 8.2 G/DL
RBC # BLD AUTO: 3.77 M/UL
SODIUM SERPL-SCNC: 141 MMOL/L
WBC # BLD AUTO: 3.93 K/UL

## 2018-09-12 PROCEDURE — 85025 COMPLETE CBC W/AUTO DIFF WBC: CPT

## 2018-09-12 PROCEDURE — 83880 ASSAY OF NATRIURETIC PEPTIDE: CPT

## 2018-09-12 PROCEDURE — 80158 DRUG ASSAY CYCLOSPORINE: CPT

## 2018-09-12 PROCEDURE — 36415 COLL VENOUS BLD VENIPUNCTURE: CPT

## 2018-09-12 PROCEDURE — 80053 COMPREHEN METABOLIC PANEL: CPT

## 2018-09-13 ENCOUNTER — HOSPITAL ENCOUNTER (EMERGENCY)
Facility: HOSPITAL | Age: 63
Discharge: HOME | End: 2018-09-13
Attending: EMERGENCY MEDICINE
Payer: MEDICARE

## 2018-09-13 ENCOUNTER — TELEPHONE (OUTPATIENT)
Dept: TRANSPLANT | Facility: CLINIC | Age: 63
End: 2018-09-13

## 2018-09-13 VITALS
WEIGHT: 135.69 LBS | OXYGEN SATURATION: 98 % | DIASTOLIC BLOOD PRESSURE: 75 MMHG | TEMPERATURE: 98 F | BODY MASS INDEX: 24.97 KG/M2 | SYSTOLIC BLOOD PRESSURE: 114 MMHG | HEART RATE: 92 BPM | HEIGHT: 62 IN | RESPIRATION RATE: 20 BRPM

## 2018-09-13 DIAGNOSIS — E87.5 HYPERKALEMIA: ICD-10-CM

## 2018-09-13 DIAGNOSIS — R53.1 WEAKNESS: Primary | ICD-10-CM

## 2018-09-13 LAB
ALBUMIN SERPL BCP-MCNC: 3.4 G/DL
ALP SERPL-CCNC: 104 U/L
ALT SERPL W/O P-5'-P-CCNC: 31 U/L
ANION GAP SERPL CALC-SCNC: 10 MMOL/L
AST SERPL-CCNC: 35 U/L
BILIRUB SERPL-MCNC: 0.6 MG/DL
BUN SERPL-MCNC: 36 MG/DL
CALCIUM SERPL-MCNC: 9 MG/DL
CHLORIDE SERPL-SCNC: 105 MMOL/L
CO2 SERPL-SCNC: 24 MMOL/L
CREAT SERPL-MCNC: 1.6 MG/DL
CYCLOSPORINE BLD LC/MS/MS-MCNC: 104 NG/ML
EST. GFR  (AFRICAN AMERICAN): 39 ML/MIN/1.73 M^2
EST. GFR  (NON AFRICAN AMERICAN): 34 ML/MIN/1.73 M^2
GLUCOSE SERPL-MCNC: 119 MG/DL
POTASSIUM SERPL-SCNC: 4.2 MMOL/L
PROT SERPL-MCNC: 8.2 G/DL
SODIUM SERPL-SCNC: 139 MMOL/L

## 2018-09-13 PROCEDURE — 36415 COLL VENOUS BLD VENIPUNCTURE: CPT

## 2018-09-13 PROCEDURE — 80053 COMPREHEN METABOLIC PANEL: CPT

## 2018-09-13 PROCEDURE — 99284 EMERGENCY DEPT VISIT MOD MDM: CPT | Mod: 25

## 2018-09-13 PROCEDURE — 93005 ELECTROCARDIOGRAM TRACING: CPT

## 2018-09-13 PROCEDURE — 93010 ELECTROCARDIOGRAM REPORT: CPT | Mod: ,,, | Performed by: INTERNAL MEDICINE

## 2018-09-13 NOTE — ED PROVIDER NOTES
"Encounter Date: 9/13/2018       History     Chief Complaint   Patient presents with    Abnormal Lab     Pt states, "I am a heart transplant patient and the Doctor called and said my potassium was too high."     63 year old female with stage 3 CKD sent to ER for elevated potassium over the past week.  Pt reports weakness over the past week.  Denies chest pain.  Denies abdominal pain.  Denies focal weakness.           Review of patient's allergies indicates:   Allergen Reactions    Lisinopril Swelling and Rash    Atorvastatin Swelling    Hydrocodone Nausea And Vomiting     Past Medical History:   Diagnosis Date    Abdominal wall hernia     CT Renal 6/11/2018---Small fat containing superior ventral abdominal wall hernia at the epicardial pacing lead site.    Anxiety     Arthritis     Chronic kidney disease     Chronic midline low back pain without sciatica 6/18/2018    Coronary artery disease     Depression     Fibromyalgia     on Lyrica    Heart failure     native heart cardiomyopathy    Heart transplanted 1993    due to cardiomyopathy    History of hyperparathyroidism; Hyperparathyroidism, secondary renal     Hypertension     Immune disorder     anti rejection meds    Iron deficiency anemia 8/15/2017    Obesity     Shingles 2003 approx    left leg    Trouble in sleeping     Urinary incontinence      Past Surgical History:   Procedure Laterality Date    BLADDER SURGERY  2015 approx    mesh - Dr Everett then 2nd reconstructive sx Dr Onofre    BREAST SURGERY Left 9/28/15    Bx - benign    BREAST SURGERY Right 12/2015    Bx benign    CARDIAC PACEMAKER REMOVAL Left 06/26/14    Pacer defirillator removed. Put in 1993 aat time of heart transplant    CARPAL TUNNEL RELEASE Left 03/03/15    Dr. Hall    HEART TRANSPLANT  1993    HERNIA REPAIR Right 1971 approx    Inguinal    HYSTERECTOMY  1983    vag hyst /LSO     RELEASE-CARPAL TUNNEL- LEFT Left 3/3/2015    Performed by Javed Hall MD at " [facility]     Family History   Problem Relation Age of Onset    Cancer Mother 38        breast    Breast cancer Mother     Heart disease Maternal Grandmother     Cataracts Cousin     Hypertension Son     Diabetes Neg Hx     Stroke Neg Hx     Kidney disease Neg Hx     Asthma Neg Hx     COPD Neg Hx     Melanoma Neg Hx      Social History     Tobacco Use    Smoking status: Never Smoker    Smokeless tobacco: Never Used   Substance Use Topics    Alcohol use: No     Alcohol/week: 0.0 oz    Drug use: No     Review of Systems   Constitutional: Negative for fever.   HENT: Negative for sore throat.    Respiratory: Negative for shortness of breath.    Cardiovascular: Negative for chest pain.   Gastrointestinal: Negative for nausea.   Genitourinary: Negative for dysuria.   Musculoskeletal: Negative for back pain.   Skin: Negative for rash.   Neurological: Positive for weakness.   Hematological: Does not bruise/bleed easily.       Physical Exam     Initial Vitals [09/13/18 1207]   BP Pulse Resp Temp SpO2   114/75 92 20 98.1 °F (36.7 °C) 98 %      MAP       --         Physical Exam    Nursing note reviewed.  Constitutional: She appears well-developed and well-nourished.   HENT:   Head: Normocephalic and atraumatic.   Eyes: Conjunctivae and EOM are normal. Pupils are equal, round, and reactive to light.   Neck: Normal range of motion. Neck supple.   Cardiovascular: Normal rate, regular rhythm, normal heart sounds and intact distal pulses.   Pulmonary/Chest: Breath sounds normal.   Abdominal: Soft. There is no tenderness. There is no rebound and no guarding.   Musculoskeletal: Normal range of motion.   Neurological: She is alert and oriented to person, place, and time. She has normal strength and normal reflexes.   Skin: Skin is warm and dry.   Psychiatric: She has a normal mood and affect. Her behavior is normal. Thought content normal.         ED Course   Procedures  Labs Reviewed   COMPREHENSIVE METABOLIC PANEL -  Abnormal; Notable for the following components:       Result Value    Glucose 119 (*)     BUN, Bld 36 (*)     Creatinine 1.6 (*)     Albumin 3.4 (*)     eGFR if  39 (*)     eGFR if non  34 (*)     All other components within normal limits     EKG Readings: (Independently Interpreted)   Other EKG Interpretations: EKG: NSR with rate of 83, RBB with t wave inversion V1-V5 unchanged from 9/4/18       Imaging Results    None           Labs Reviewed   COMPREHENSIVE METABOLIC PANEL - Abnormal; Notable for the following components:       Result Value    Glucose 119 (*)     BUN, Bld 36 (*)     Creatinine 1.6 (*)     Albumin 3.4 (*)     eGFR if  39 (*)     eGFR if non  34 (*)     All other components within normal limits           1:20 PM  Pt counseled on avoiding foods with high potassium.  Pt verbalized understanding.  Will follow up with PCP for recheck                 Clinical Impression:   The primary encounter diagnosis was Weakness. A diagnosis of Hyperkalemia was also pertinent to this visit.                             Jeffry Angulo NP  09/13/18 [id number]

## 2018-09-13 NOTE — TELEPHONE ENCOUNTER
Urged pt to go to ER, have k repeated and treated, her baseline creat 1.6. Pt stated understanding.

## 2018-09-17 ENCOUNTER — APPOINTMENT (OUTPATIENT)
Dept: RADIOLOGY | Facility: HOSPITAL | Age: 63
End: 2018-09-17
Attending: FAMILY MEDICINE
Payer: MEDICARE

## 2018-09-17 ENCOUNTER — TELEPHONE (OUTPATIENT)
Dept: INTERNAL MEDICINE | Facility: CLINIC | Age: 63
End: 2018-09-17

## 2018-09-17 ENCOUNTER — OFFICE VISIT (OUTPATIENT)
Dept: INTERNAL MEDICINE | Facility: CLINIC | Age: 63
End: 2018-09-17
Payer: MEDICARE

## 2018-09-17 VITALS
DIASTOLIC BLOOD PRESSURE: 79 MMHG | SYSTOLIC BLOOD PRESSURE: 110 MMHG | HEART RATE: 81 BPM | BODY MASS INDEX: 24.72 KG/M2 | OXYGEN SATURATION: 100 % | WEIGHT: 135.13 LBS | TEMPERATURE: 98 F

## 2018-09-17 DIAGNOSIS — E87.5 HYPERKALEMIA: ICD-10-CM

## 2018-09-17 DIAGNOSIS — T14.8XXA BRUISED: ICD-10-CM

## 2018-09-17 DIAGNOSIS — T14.8XXA BRUISED: Primary | ICD-10-CM

## 2018-09-17 LAB — POTASSIUM SERPL-SCNC: 4.8 MMOL/L

## 2018-09-17 PROCEDURE — 84132 ASSAY OF SERUM POTASSIUM: CPT

## 2018-09-17 PROCEDURE — 99999 PR PBB SHADOW E&M-EST. PATIENT-LVL IV: CPT | Mod: PBBFAC,,, | Performed by: FAMILY MEDICINE

## 2018-09-17 PROCEDURE — 99213 OFFICE O/P EST LOW 20 MIN: CPT | Mod: S$PBB,,, | Performed by: FAMILY MEDICINE

## 2018-09-17 PROCEDURE — 3008F BODY MASS INDEX DOCD: CPT | Mod: CPTII,,, | Performed by: FAMILY MEDICINE

## 2018-09-17 PROCEDURE — 73660 X-RAY EXAM OF TOE(S): CPT | Mod: TC,FY,PO,LT

## 2018-09-17 PROCEDURE — 99214 OFFICE O/P EST MOD 30 MIN: CPT | Mod: PBBFAC,25,PO | Performed by: FAMILY MEDICINE

## 2018-09-17 PROCEDURE — 73660 X-RAY EXAM OF TOE(S): CPT | Mod: 26,LT,, | Performed by: RADIOLOGY

## 2018-09-17 PROCEDURE — 3078F DIAST BP <80 MM HG: CPT | Mod: CPTII,,, | Performed by: FAMILY MEDICINE

## 2018-09-17 PROCEDURE — 3074F SYST BP LT 130 MM HG: CPT | Mod: CPTII,,, | Performed by: FAMILY MEDICINE

## 2018-09-17 RX ORDER — ACETAMINOPHEN AND CODEINE PHOSPHATE 300; 30 MG/1; MG/1
1 TABLET ORAL EVERY 6 HOURS PRN
Qty: 20 TABLET | Refills: 0 | Status: SHIPPED | OUTPATIENT
Start: 2018-09-17 | End: 2018-09-27

## 2018-09-17 NOTE — TELEPHONE ENCOUNTER
----- Message from Yolette Das sent at 9/17/2018  3:56 PM CDT -----  Contact: Patient  Patient called for lab results. She can be contacted at [phone].    Thanks,  Yolette

## 2018-09-17 NOTE — PROGRESS NOTES
Subjective:       Patient ID: Nadia Damon is a 63 y.o. female.    Chief Complaint: left foot swollen and painful (toe)    She awakened 2 days ago at 3 o'clock a morning with a charley horse in her left leg and pain in the left big toe.  Denies any known direct toel trauma.  She has a bunion on that foot this been stable for quite a while.      Review of Systems   Constitutional: Negative for chills and fever.   Musculoskeletal:        Pain in the left big toe       Objective:      Physical Exam   Musculoskeletal:   She has not area of ecchymosis left dorsal distal big toe.  There is tenderness in that area.  There is no redness swelling.  She has a bunion of the left big toe.  There is no pain with range of motion tenderness or redness of that area.       Admission on 09/13/2018, Discharged on 09/13/2018   Component Date Value Ref Range Status    Sodium 09/13/2018 139  136 - 145 mmol/L Final    Potassium 09/13/2018 4.2  3.5 - 5.1 mmol/L Final    Chloride 09/13/2018 105  95 - 110 mmol/L Final    CO2 09/13/2018 24  23 - 29 mmol/L Final    Glucose 09/13/2018 119* 70 - 110 mg/dL Final    BUN, Bld 09/13/2018 36* 8 - 23 mg/dL Final    Creatinine 09/13/2018 1.6* 0.5 - 1.4 mg/dL Final    Calcium 09/13/2018 9.0  8.7 - 10.5 mg/dL Final    Total Protein 09/13/2018 8.2  6.0 - 8.4 g/dL Final    Albumin 09/13/2018 3.4* 3.5 - 5.2 g/dL Final    Total Bilirubin 09/13/2018 0.6  0.1 - 1.0 mg/dL Final    Alkaline Phosphatase 09/13/2018 104  55 - 135 U/L Final    AST 09/13/2018 35  10 - 40 U/L Final    ALT 09/13/2018 31  10 - 44 U/L Final    Anion Gap 09/13/2018 10  8 - 16 mmol/L Final    eGFR if  09/13/2018 39* >60 mL/min/1.73 m^2 Final    eGFR if non African American 09/13/2018 34* >60 mL/min/1.73 m^2 Final     Assessment:       1. Bruised        Plan:     X-ray toe  Saline soaks rest and tylenol with codeine for pain ov 2wk  if not resolved  she reports she needs a potassium recheck.  Recent  potassium was normal at 4.2.  Bruised  -     X-Ray Toe 2 or More Views Left; Future; Expected date: 09/17/2018

## 2018-09-18 ENCOUNTER — OUTPATIENT CASE MANAGEMENT (OUTPATIENT)
Dept: ADMINISTRATIVE | Facility: OTHER | Age: 63
End: 2018-09-18

## 2018-09-18 NOTE — PROGRESS NOTES
The following patient has been assigned to Geri Saha RN with Outpatient Complex Care Management for high risk screening.    Reason: High Risk Recently Discharged    Please contact [facility] at [phone] with any questions.    Thank you,    Flavia Floyd    Outpatient Case Management

## 2018-09-19 ENCOUNTER — TELEPHONE (OUTPATIENT)
Dept: INTERNAL MEDICINE | Facility: CLINIC | Age: 63
End: 2018-09-19

## 2018-09-19 NOTE — TELEPHONE ENCOUNTER
----- Message from Gwen Vasquez sent at 9/19/2018  8:54 AM CDT -----  Contact: Patient  Patient requesting her lab results, please call her back at [phone]. Thank  you

## 2018-09-24 DIAGNOSIS — F51.01 PRIMARY INSOMNIA: ICD-10-CM

## 2018-09-25 RX ORDER — ZOLPIDEM TARTRATE 10 MG/1
TABLET ORAL
Qty: 30 TABLET | Refills: 5 | Status: SHIPPED | OUTPATIENT
Start: 2018-09-25 | End: 2019-03-21 | Stop reason: SDUPTHER

## 2018-09-26 ENCOUNTER — OUTPATIENT CASE MANAGEMENT (OUTPATIENT)
Dept: ADMINISTRATIVE | Facility: OTHER | Age: 63
End: 2018-09-26

## 2018-09-26 NOTE — PROGRESS NOTES
9/26/18 - Summary: Phone contact made with pt today for completion of Initial Screen for OPCM. She is a 64 y/o female diagnosed with anxiety, depression, hx of heart transplant 25 years ago, CKD, stage 4 and  immunocompromised patient. She lives alone in an assisted living apt that provides lunch time meals daily from the [employer]. She does not use any type of assistive device with mobility and denies any falls in the past year. She is currently participating in outpt PT at [facility] to treat ongoing mckenzie hip pain. She is independent with all ADL's/IADL's as she continues to do all her own driving, housekeeping and shopping along with all personal care, medication and money management. She reports being very concerned about her progressively deteriorating kidney function and the diet restrictions she needs to follow to prevent any further loss of kidney function. She reports a weight loss of about 30-40 lbs over the past 6 months related to being unsure what she can and should eat. Because she lives in an assisted living facility, she does not need any other disaster preparedness plans as the facility will assist with evacuation in the event of an emergency. She is utilizing the [employer] program, but is unaware of the transportation benefit thru [employer]. She has never completed any type of Advanced Directive.     Interventions: Discussed pt's concern about her dietary intake that is consistent with recommended renal diet restrictions. She reports being concerned about eating too much protein, but knows she needs a small portion with each meal to help maintain adequate nutritional status. She has never rec'd education from a RD or renal disease program. Advised CM will send her a brochure about [employer], a one hour educational class targeting pts with Stage 3, 4 or 5 CKD. It is provided by a RD and is free. She verbalized being very interested in participating. Advised CM will also send her  information about immunosuppression and how to monitor for symptoms of infection. Discussed need to complete an Advanced Directive and advised CM will send an AD Packet for her completion. Encouraged her to bring it with her to her next provider appt upon completion so that it can be scanned into her chart.     Plan: Mail educational literature as outlined above. Follow up in two weeks to see if she has rec'd mailed literature and has reviewed it.     Todays OPCM Self-Management Care Plan was developed with the patients/caregivers input and was based on identified barriers from todays assessment.  Goals were written today with the patient/caregiver and the patient has agreed to work towards these goals to improve his/her overall well-being. Patient verbalized understanding of the care plan, goals, and all of today's instructions. Encouraged patient/caregiver to communicate with his/her physician and health care team about health conditions and the treatment plan.  Provided my contact information today and encouraged patient/caregiver to call me with any questions as needed.

## 2018-09-26 NOTE — LETTER
September 27, 2018    Nadia Damon  [address]  [address]  [place] [address]             [facility] [address] [place] [address] Dear  Nelson,       Thank you for your time in getting admitted to Outpatient Case Management. I have included some educational brochures in this mail out along with my business card.    Please review the enclosed literature and call me if you have any questions. I will call you soon to discuss this.    Thanks again for your time and cooperation,        Geri Saha RN  Outpatient Case Management  [phone]  [facility] [id number]  [email or web address]

## 2018-09-27 NOTE — PATIENT INSTRUCTIONS
Kidney Disease: Choosing the Right Protein for Your Body  Choosing the right type and quantity of proteins you eat is important when you have chronic kidney disease. This can affect your overall health and kidney function we well.    Choosing the right amount of protein  If you have kidney disease but are not yet on dialysis, you will most likely need a low-protein diet. This is important in slowing down the speed at which your kidneys are failing. The amount of protein you can eat daily will be calculated by the dietitian in the kidney clinic. It is based on your body weight, the degree of kidney failure, and your daily activities.  Eat your daily protein  The amount of protein that you can eat each day may change with time. Your healthcare provider determines your protein intake according to the stage of your kidney disease.  Your body weight is also a factor. If your protein intake is decreased, you may need to eat more calories from other types of food. Carbohydrates, such as bread and pasta, make good choices.  · I can eat _____ grams of protein each day.  · I should eat a total of _____ calories each day to maintain my body weight and muscle mass.  Choosing the right type of protein  People with kidney failure have to limit the amount of phosphorus in the diet. Unfortunately, many proteins contain high amounts of phosphorus and may have to be taken in limited amounts. Milk products are a good example because they have a lot of phosphorus. The choices of protein may also be limited because of cultural, Rastafari, and social values. Vegetarian, vegan, kosher, and halal diets are all good examples of diets with limited protein choices. Dietitians in the clinic can work out the protein types and amounts to suit your needs.  Date Last Reviewed: 1/1/2017  © 3723-3158 [employer]. [address] [place] [address], [place] [address]. All rights reserved. This information is not intended as a substitute for  professional medical care. Always follow your healthcare professional's instructions.        Diet for Chronic Kidney Disease  Following a special diet when you have kidney disease can help you stay as healthy as possible. Your healthcare provider or dietitian should make a special diet plan just for you.    Eating right  Here are some good eating rules to follow:  · Protein. Eating protein is important for your body. But too much protein can put a strain on your kidneys. Eating less protein may slow the progression of chronic kidney disease. Foods high in protein include meat, fish, eggs, cheese, and other dairy products. A registered dietitian can help you plan a diet that has the right amount of protein for you.  · Sodium. Having too much salt in your diet can make your body hold onto (retain) water. Ask your provider or dietitian how much sodium per day you are allowed. This will help you avoid fluid buildup in your body (fluid retention). It can also help control high blood pressure. Learn to read food labels to know how much sodium is in one serving. Foods high in salt include processed meats, canned and boxed foods, sauces, salted chips and snacks, pickled foods, frozen dinners, and restaurant and fast food.  · Fluids. If you have advanced kidney disease, you will need to limit the water and fluids you drink. If you dont, then too much water will build up in your body. The exact amount of fluid you can drink depends on how well your kidneys are working. Ask your provider how much water you can safely drink each day.  · Potassium. In advanced kidney disease, your potassium level can go dangerously high. This affects your heart. It can cause an irregular heartbeat (arrhythmia). Ask your provider or dietitian if you should limit potassium in your diet. Foods high in potassium include dairy products (milk, yogurt, cheese), dried beans, bananas, oranges, potatoes, tomatoes, spinach, cantaloupe, honeydew melon,  dried fruits, and nuts.   · Calcium. Calcium is important to build strong bones. But foods high in calcium are also high in phosphorus, which can take calcium from your bones. Limiting foods high in phosphorus will help keep calcium in your bones. Ask your provider how much calcium you should get each day.  · Phosphorus. In advanced kidney disease, your phosphorus level can go dangerously high. This affects many systems in the body and can damage your heart. Limit your intake of phosphorus-rich foods. These include dried beans and peas, nuts, peanut butter, cocoa, beer, cola drinks, and dairy products.  Date Last Reviewed: 8/1/2016 © 2000-2017 [employer]. [address] [place] [address], [place] [address]. All rights reserved. This information is not intended as a substitute for professional medical care. Always follow your healthcare professional's instructions.        Kidney Disease: Getting the Right Amount of Protein     One portion (3 to 4 ounces) of fish, chicken, or red meat is about the size of a deck of playing cards.   Your body needs protein to build and repair muscles and bones along with other important body functions. But as the body uses protein, a waste product (blood urea nitrogen or BUN) is produced. If your kidneys cant filter wastes from your blood normally, the BUN level increases. If the level gets too high, you can become sick. Because of this, you need to control the amount of protein you eat each day. Use this handout to help you.     Measuring protein content  You know how many grams of protein to eat, but most food portions are measured in ounces. Use the chart below to help determine the protein content of some common foods.  Chicken breast 3 to 4 ounces 21 to 28 grams   Chicken thigh 2 to 2.5 ounces 14 to 18 grams   Fish 3 ounces 21 grams   Pork chop 2 to 2.5 ounces 14 to18 grams   Roast beef 3 ounces 21 grams   Steak 3 to 4 ounces 21 to 28 grams   Hamburger 3 to 4 ounces 21 to  28 grams   Eggs 1 egg 7 grams   Cheese 1 ounce 7 grams   Most beans 4 ounces 7 to 10 grams   Tofu 2 ounces 5 grams   Most nuts 2 ounces 5 to 8 grams      If you eat too much protein  Eating too much protein may cause the following:     · Nausea, vomiting  · Fatigue  · Mental confusion  · Increased potassium levels  · Increased phosphorus levels   · Increased time on hemodialysis  · Risk of speeding the loss of kidney function  If you eat too little protein  Eating too little protein may cause the following:  · Muscle loss, weakness  · Fatigue  · Weight loss  · Slower wound healing      Date Last Reviewed: 1/1/2017 © 2000-2017 [employer]. [address] [place] [address], [place] [address]. All rights reserved. This information is not intended as a substitute for professional medical care. Always follow your healthcare professional's instructions.        Discharge Instructions for Immunocompromised Patients  You have either undergone a procedure or been diagnosed with an illness that has made you immunocompromised. This means that your immune system is very weak, making it difficult to fight off infection. The ability to fight off infection varies. It depends on your specific condition and the treatment you have. Certain cancers, cancer treatments, HIV infection, and transplant surgery are examples of things that can make you immunocompromised. You must be very careful--even the slightest infection can carry the risk of hospitalization or death. The following information will help you protect yourself from infection.  · Make an appointment to see your healthcare provider as soon as possible.  · Follow the instructions until your healthcare provider tells you that you can stop.  · Some of the instructions may not be necessary. Ask your provider what's necessary for you.  Medicine  · Take your medicines exactly as directed.  · Dont take any other medicines, including those available over-the-counter unless  your healthcare provider says its OK.  · Tell your provider about any side effects you have.  Skin care  · Wash your hands often, especially after using the bathroom. Make sure you wash them before and after changing any dressing or bandages.  · Avoid direct sun exposure. And use sunscreen that is labeled hydroallergenic and has an SPF of 30 or higher.   · Use an electric razor for shaving so you don't cut yourself.  · Check your skin daily for irritation, cracks, or rashes.  Keep your home clean  · Clean floors, carpets, furniture, and countertops regularly. Use products that kill germs.  · Be sure your kitchen is clean and all foods are safely stored.  · Be sure your bathroom is clean.  · Don't keep plants or flowers indoors. If you garden, wear gloves.  · Wash your hands after handling trash.  Prevent colds and the flu  · Wash your hands or use hand  often. Try to keep your hands away from your mouth, nose, and face. Make sure you wash your hands before eating.  · Avoid public places such as shopping malls, especially when crowded.  · Limit visits with young children. They often have colds or the flu.  · Avoid contact with anyone who has a cold, the flu, or another contagious condition (such as measles, chickenpox, herpes, pinkeye, cough, or sore throat).  · Check with your provider about whether or not you should wear a mask when you are around people.  · Check with your provider about recommended immunizations or vaccines.  Other ways to lower your risk of infection  · Check with your provider before having close contact with others.  · Ask your provider before using cosmetics, contact lenses, tampons, or douches.  · Dont smoke or use tobacco products.  · Dont use portable humidifiers or vaporizers.  · Avoid contact with animals.  ¨ If you do touch an animal, wash your hands immediately afterward.  ¨ Avoid contact with pet urine or feces.  ¨ Dont clean litter boxes, cages, or aquariums.  · Check  with your provider before cutting your nails. It may be advised that you file your nails instead. If you have trouble cutting or filing your own toenails, a podiatrist or foot healthcare provider can help.  · To avoid injuring your feet or coming in contact with germs, always wear shoes.  Follow-up care  Make sure you see your provider as soon as possible. You will likely have an exam and additional tests, if necessary. You will also have a chance to ask questions.  Make a follow-up appointment as directed by our staff.     When to call your healthcare provider  Call your healthcare provider right away if you have any of the following:  · Blurred vision or eye problems  · Trouble concentrating  · Ongoing fatigue  · Shortness of breath  · Rapid, irregular heartbeat  · Dizziness or lightheadedness  · Rash or hives  · Wheezing or trouble breathing  · Skin cut or sore that swells, turns red, feels hot or painful, or begins to ooze  · Fever of 100.4°F (38.0°C) or higher, or chills  · Diarrhea that does not go away after 2 loose stools  · Pain or cramping in the belly   Date Last Reviewed: 9/1/2016  © 4287-0960 [employer]. [address] [place] [address], [place] [address]. All rights reserved. This information is not intended as a substitute for professional medical care. Always follow your healthcare professional's instructions.

## 2018-10-02 ENCOUNTER — TELEPHONE (OUTPATIENT)
Dept: INTERNAL MEDICINE | Facility: CLINIC | Age: 63
End: 2018-10-02

## 2018-10-02 DIAGNOSIS — M62.838 MUSCLE SPASMS OF BOTH LOWER EXTREMITIES: ICD-10-CM

## 2018-10-02 NOTE — TELEPHONE ENCOUNTER
----- Message from Yassinezacharyxin Kumar sent at 10/2/2018  1:09 PM CDT -----  Contact: Self-[phone]  Pt would like refills called on Rx medication Ambient.  Please call back at [phone]. Vincent             Pt Uses:  .    [facility]/pharmacy #[id number] - [place] - [address] [facility] AT [address]  [place] [address]  Phone: [phone] Fax: [phone]

## 2018-10-02 NOTE — TELEPHONE ENCOUNTER
----- Message from Nani Pardo sent at 10/2/2018  2:32 PM CDT -----  Contact: self/[phone]  Returning all, please call back at [phone]. Thanks/ar

## 2018-10-02 NOTE — TELEPHONE ENCOUNTER
Spoke with pt, informed that the med is not at pharmacy, will fax Rx over to local pharmacy instead.

## 2018-10-02 NOTE — TELEPHONE ENCOUNTER
Informed that meds were sent to [facility] and not local. Verbalized understanding. States she will call to make sure they received the Rx.

## 2018-10-03 RX ORDER — BACLOFEN 10 MG/1
TABLET ORAL
Qty: 270 TABLET | Refills: 1 | Status: SHIPPED | OUTPATIENT
Start: 2018-10-03 | End: 2019-06-19 | Stop reason: SDUPTHER

## 2018-10-03 RX ORDER — CYCLOSPORINE 25 MG/1
CAPSULE, LIQUID FILLED ORAL
Qty: 270 CAPSULE | Refills: 3 | Status: SHIPPED | OUTPATIENT
Start: 2018-10-03 | End: 2019-11-18 | Stop reason: SDUPTHER

## 2018-10-03 RX ORDER — CYCLOSPORINE 100 MG/1
CAPSULE, LIQUID FILLED ORAL
Qty: 90 CAPSULE | Refills: 3 | Status: SHIPPED | OUTPATIENT
Start: 2018-10-03 | End: 2019-11-18 | Stop reason: SDUPTHER

## 2018-10-11 ENCOUNTER — OUTPATIENT CASE MANAGEMENT (OUTPATIENT)
Dept: ADMINISTRATIVE | Facility: OTHER | Age: 63
End: 2018-10-11

## 2018-10-11 NOTE — PROGRESS NOTES
10/11/18 - SUMMARY: Phone contact made with pt today for follow up with OPCM. She has rec'd literature mailed by CM and has completed the Living Will. She will bring it with her to her next provider appt so it can be added to her chart. She has improved pain to her broken toe, but is still wearing a boot. She is to follow up with her PCP tomorrow and is hopeful that she can stop wearing the boot. She voices understanding of all the literature she has reviewed, but states she has not reviewed much of it because her glasses are broken and she has a hard time seeing to read it.   INTERVENTION: Encouraged scheduling of appt with the dietitian with the [facility] program that can give her diet information for improved control of CKD. Reviewed immunosuppressed status and need to seek treatment if she feels bad or develops cough, fever, malaise and fatigue or any other unusual symptoms, she should notify her PCP to report.   PLAN: Follow up in two weeks. Review importance of maintaining routine follow up appts with all providers related to immunosuppressed status. Has she scheduled an appt with RN thru [facility]?      Todays OPCM Self-Management Care Plan was reviewed with the patients/caregivers input based on identified barriers from todays and previous assessments.  Goals were reviewed today with the patient/caregiver and the patient has agreed to continue to work towards these goals to improve his/her overall well-being. Patient verbalized understanding of the care plan, goals, and all of today's instructions. Encouraged patient/caregiver to communicate with his/her physician and health care team about health conditions and the treatment plan.  Provided my contact information today and encouraged patient/caregiver to call me with any questions as needed. Geri Saha RN

## 2018-10-22 ENCOUNTER — OFFICE VISIT (OUTPATIENT)
Dept: INTERNAL MEDICINE | Facility: CLINIC | Age: 63
End: 2018-10-22
Payer: MEDICARE

## 2018-10-22 VITALS
HEIGHT: 62 IN | TEMPERATURE: 98 F | SYSTOLIC BLOOD PRESSURE: 105 MMHG | BODY MASS INDEX: 25.64 KG/M2 | HEART RATE: 93 BPM | DIASTOLIC BLOOD PRESSURE: 71 MMHG | WEIGHT: 139.31 LBS | OXYGEN SATURATION: 99 %

## 2018-10-22 DIAGNOSIS — I10 ESSENTIAL HYPERTENSION: ICD-10-CM

## 2018-10-22 DIAGNOSIS — N18.4 CKD (CHRONIC KIDNEY DISEASE) STAGE 4, GFR 15-29 ML/MIN: ICD-10-CM

## 2018-10-22 DIAGNOSIS — S92.425D CLOSED NONDISPLACED FRACTURE OF DISTAL PHALANX OF LEFT GREAT TOE WITH ROUTINE HEALING, SUBSEQUENT ENCOUNTER: ICD-10-CM

## 2018-10-22 DIAGNOSIS — Z94.1 HEART TRANSPLANTED: ICD-10-CM

## 2018-10-22 DIAGNOSIS — Z01.818 PREOPERATIVE CLEARANCE: Primary | ICD-10-CM

## 2018-10-22 PROCEDURE — 99214 OFFICE O/P EST MOD 30 MIN: CPT | Mod: S$PBB,,, | Performed by: FAMILY MEDICINE

## 2018-10-22 PROCEDURE — 3074F SYST BP LT 130 MM HG: CPT | Mod: CPTII,,, | Performed by: FAMILY MEDICINE

## 2018-10-22 PROCEDURE — 3078F DIAST BP <80 MM HG: CPT | Mod: CPTII,,, | Performed by: FAMILY MEDICINE

## 2018-10-22 PROCEDURE — 99214 OFFICE O/P EST MOD 30 MIN: CPT | Mod: PBBFAC,PO,25 | Performed by: FAMILY MEDICINE

## 2018-10-22 PROCEDURE — 3008F BODY MASS INDEX DOCD: CPT | Mod: CPTII,,, | Performed by: FAMILY MEDICINE

## 2018-10-22 PROCEDURE — 99999 PR PBB SHADOW E&M-EST. PATIENT-LVL IV: CPT | Mod: PBBFAC,,, | Performed by: FAMILY MEDICINE

## 2018-10-22 PROCEDURE — 90686 IIV4 VACC NO PRSV 0.5 ML IM: CPT | Mod: PBBFAC,PO

## 2018-10-22 PROCEDURE — 90732 PPSV23 VACC 2 YRS+ SUBQ/IM: CPT | Mod: PBBFAC,PO

## 2018-10-22 RX ORDER — FERROUS SULFATE 325(65) MG
325 TABLET ORAL
Refills: 0 | COMMUNITY
Start: 2018-10-22 | End: 2018-10-22 | Stop reason: SDUPTHER

## 2018-10-22 RX ORDER — FERROUS SULFATE 325(65) MG
325 TABLET ORAL DAILY
Qty: 100 TABLET | Refills: 3 | Status: SHIPPED | OUTPATIENT
Start: 2018-10-22 | End: 2022-02-08

## 2018-10-22 NOTE — PROGRESS NOTES
Subjective:       Patient ID: Nadia Damon is a 63 y.o. female.    Chief Complaint: Follow-up and Pre-op Exam    Preop clearance for left foot surgery.  Podiatry is also now following toe fracture.  She is wearing a boot on her left foot.  Medical history includes heart transplant hypertension chronic kidney disease hyperparathyroidism secondary to renal insufficiency.  She is also followed by Cardiology as well as start transplant team.      Review of Systems   Constitutional: Negative for appetite change, chills, fatigue and unexpected weight change.   HENT: Negative for congestion.    Eyes: Negative for visual disturbance.   Respiratory: Negative for cough, chest tightness, shortness of breath and wheezing.    Cardiovascular: Negative for chest pain, palpitations and leg swelling.        Denies lightheadedness syncope   Gastrointestinal: Negative for abdominal pain.   Genitourinary: Negative for difficulty urinating.   Musculoskeletal:        Ft pain secondary to toe fracture as well as bunion deformity of the 1st MTP joint   Skin: Negative for rash.   Neurological: Negative for dizziness, syncope, facial asymmetry, speech difficulty, weakness, light-headedness and headaches.       Objective:      Physical Exam   Constitutional: She is oriented to person, place, and time. She appears well-developed and well-nourished. No distress.   HENT:   Right Ear: External ear normal.   Left Ear: External ear normal.   Mouth/Throat: Oropharynx is clear and moist.   Upper dentures and partial lower dentures   Eyes: Pupils are equal, round, and reactive to light.   Neck: Neck supple. No JVD present. No thyromegaly present.   Cardiovascular: Normal rate and regular rhythm.   No murmur heard.  Grade 1/4 soft systolic murmur and upper sternal region.  She also has a possible split 2nd heart sound   Pulmonary/Chest: Effort normal and breath sounds normal. No respiratory distress. She has no wheezes.   Abdominal: Soft. Bowel  sounds are normal. She exhibits no mass. There is no tenderness.   Musculoskeletal:   Left foot in walking boot   Lymphadenopathy:     She has no cervical adenopathy.   Neurological: She is alert and oriented to person, place, and time.   Skin: She is not diaphoretic.       Office Visit on 09/17/2018   Component Date Value Ref Range Status    Potassium 09/17/2018 4.8  3.5 - 5.1 mmol/L Final     Assessment:       No diagnosis found.    Plan:   She has requested flu vaccination and pneumococcal 23.  I have completed my part of the preop clearance.  Cardiology in our transplant will complete clearance.  She wants to defer EKG chest x-ray lab to Cardiology.      There are no diagnoses linked to this encounter.

## 2018-10-25 ENCOUNTER — OUTPATIENT CASE MANAGEMENT (OUTPATIENT)
Dept: ADMINISTRATIVE | Facility: OTHER | Age: 63
End: 2018-10-25

## 2018-10-25 NOTE — PROGRESS NOTES
10/25/18 - SUMMARY: Phone contact made with pt today for follow up with OPCM. She is getting pre-op clearance from providers for bunionectomy to her L foot. She is no longer wearing a boot, but does still have to wear a special shoe which is much easier for her to walk in than the shoe was. She has a follow up with her nephro provider tomorrow and is to see the surgeon again on 11/12/18. She still needs to see her cardio provider as well, and states this appt is scheduled. She is seen by a cardio provider outside [facility]. She is accessing the [employer] OTC monthly benefit.  INTERVENTION: Discussed immunosuppressed status and ways to avoid infection. She advised she has already gotten a flu and pneumonia shot this year and she keeps hand  in her purse all the time. CM encouraged her to ask for a respiratory mask at any provider's office if there are people with URI symptoms. Also avoid crowds when able during flu season and wash hands frequently.   PLAN: Does she need and/or use the [employer] transportation benefit? Has she contacted the [facility] program to schedule the one hour education class? Has she been cleared by all providers to have bunion surgery and when is it scheduled?      Todays OPCM Self-Management Care Plan was reviewed with the patients/caregivers input based on identified barriers from todays and previous assessments.  Goals were reviewed today with the patient/caregiver and the patient has agreed to continue to work towards these goals to improve his/her overall well-being. Patient verbalized understanding of the care plan, goals, and all of today's instructions. Encouraged patient/caregiver to communicate with his/her physician and health care team about health conditions and the treatment plan.  Provided my contact information today and encouraged patient/caregiver to call me with any questions as needed. Geri Saha RN

## 2018-10-26 ENCOUNTER — OFFICE VISIT (OUTPATIENT)
Dept: NEPHROLOGY | Facility: CLINIC | Age: 63
End: 2018-10-26
Payer: MEDICARE

## 2018-10-26 ENCOUNTER — LAB VISIT (OUTPATIENT)
Dept: LAB | Facility: HOSPITAL | Age: 63
End: 2018-10-26
Attending: INTERNAL MEDICINE
Payer: MEDICARE

## 2018-10-26 ENCOUNTER — TELEPHONE (OUTPATIENT)
Dept: TRANSPLANT | Facility: CLINIC | Age: 63
End: 2018-10-26

## 2018-10-26 VITALS
WEIGHT: 142.19 LBS | HEART RATE: 84 BPM | BODY MASS INDEX: 26.17 KG/M2 | HEIGHT: 62 IN | DIASTOLIC BLOOD PRESSURE: 84 MMHG | SYSTOLIC BLOOD PRESSURE: 120 MMHG

## 2018-10-26 DIAGNOSIS — N20.0 NEPHROLITHIASIS: ICD-10-CM

## 2018-10-26 DIAGNOSIS — T45.1X5A NEPHROTOXICITY DUE TO CALCINEURIN INHIBITOR THERAPY OF TRANSPLANTED KIDNEY: ICD-10-CM

## 2018-10-26 DIAGNOSIS — N18.30 CHRONIC KIDNEY DISEASE, STAGE III (MODERATE): ICD-10-CM

## 2018-10-26 DIAGNOSIS — N20.0 NEPHROLITHIASIS: Primary | ICD-10-CM

## 2018-10-26 DIAGNOSIS — Z71.89 ENCOUNTER FOR MEDICATION REVIEW AND COUNSELING: ICD-10-CM

## 2018-10-26 DIAGNOSIS — N14.4 NEPHROTOXICITY DUE TO CALCINEURIN INHIBITOR THERAPY OF TRANSPLANTED KIDNEY: ICD-10-CM

## 2018-10-26 DIAGNOSIS — T86.19 NEPHROTOXICITY DUE TO CALCINEURIN INHIBITOR THERAPY OF TRANSPLANTED KIDNEY: ICD-10-CM

## 2018-10-26 DIAGNOSIS — N20.0 URIC ACID NEPHROLITHIASIS: ICD-10-CM

## 2018-10-26 DIAGNOSIS — R82.991 HYPOCITRATURIA: ICD-10-CM

## 2018-10-26 LAB
ANION GAP SERPL CALC-SCNC: 7 MMOL/L
BUN SERPL-MCNC: 28 MG/DL
CALCIUM SERPL-MCNC: 9.4 MG/DL
CHLORIDE SERPL-SCNC: 101 MMOL/L
CO2 SERPL-SCNC: 27 MMOL/L
CREAT SERPL-MCNC: 1.6 MG/DL
EST. GFR  (AFRICAN AMERICAN): 39 ML/MIN/1.73 M^2
EST. GFR  (NON AFRICAN AMERICAN): 33.8 ML/MIN/1.73 M^2
GLUCOSE SERPL-MCNC: 83 MG/DL
POTASSIUM SERPL-SCNC: 5.1 MMOL/L
SODIUM SERPL-SCNC: 135 MMOL/L
URATE SERPL-MCNC: 6.2 MG/DL

## 2018-10-26 PROCEDURE — 3008F BODY MASS INDEX DOCD: CPT | Mod: CPTII,,, | Performed by: INTERNAL MEDICINE

## 2018-10-26 PROCEDURE — 80048 BASIC METABOLIC PNL TOTAL CA: CPT

## 2018-10-26 PROCEDURE — 36415 COLL VENOUS BLD VENIPUNCTURE: CPT | Mod: PO

## 2018-10-26 PROCEDURE — 84550 ASSAY OF BLOOD/URIC ACID: CPT

## 2018-10-26 PROCEDURE — 3074F SYST BP LT 130 MM HG: CPT | Mod: CPTII,,, | Performed by: INTERNAL MEDICINE

## 2018-10-26 PROCEDURE — 3079F DIAST BP 80-89 MM HG: CPT | Mod: CPTII,,, | Performed by: INTERNAL MEDICINE

## 2018-10-26 PROCEDURE — 99214 OFFICE O/P EST MOD 30 MIN: CPT | Mod: PBBFAC,PO | Performed by: INTERNAL MEDICINE

## 2018-10-26 PROCEDURE — 99215 OFFICE O/P EST HI 40 MIN: CPT | Mod: S$PBB,,, | Performed by: INTERNAL MEDICINE

## 2018-10-26 PROCEDURE — 99999 PR PBB SHADOW E&M-EST. PATIENT-LVL IV: CPT | Mod: PBBFAC,,, | Performed by: INTERNAL MEDICINE

## 2018-10-26 NOTE — PROGRESS NOTES
NEPHROLOGY CLINIC FOLLOWUP NOTE  Date of clinic visit: 10/26/18     REASON FOR FOLLOWUP AND CHIEF COMPLAINT: nephrolithiasis and history of chronic kidney disease post-heart transplant.     HISTORY OF PRESENT ILLNESS: Ms. Damon is a 63-year-old  female with a history of CKD stage 3 and heart transplant in 1993, who presents for f/u of above. Pt previously developed right sided back pain, thought to be related to kidney stones identified on renal u/s. The stones in the R kidney were non-obstructive but were relatively large at 10 and 12 mm each. Based on h/o of taking cyclosporine to prevent heart transplant rejection and eating a lot of sea food (shrimp and crawfish), uric acid stones were suspected. Pt had additional risk factors for non-uric acid stones, including taking Vit C 1 g/day and Vit D. After dietary modification, the flank pain resolved. A f/u CT scan did not show any stones. It is possible that she passed the 2 stones, as uric acid stones are soluble on urinary alkalinization and can pass by themselves.    Pt was advised to stop Vit C and Vit D, which she confirms she has done. She was also advised on drinking freshly squeezed lemon juice and follow dietary recommendations to avoid nephrolithiasis risk factors. Pt was referred also to urology. A repeat CT scan did not show any stones. Pt obtained a 24 hour urine collection for kidney stone risk stratification which showed mild hypocitraturia.     Pt presents for f/u. She was last seen about 2.5 months ago. Pt has no new c/o's, no abd pain, no fever, no burning of pain on urination. No back pain, no flanks pain, no new stones.        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 However, pt has not had a kidney biopsy. She also has a h/o of HTN.        PAST MEDICAL HISTORY:  1. CKD stage III.  2. Hypertension.  3. Heart transplant for cardiomyopathy in 1993, the patient has  been on chronic  cyclosporine treatment.  4. Carpal tunnel syndrome.  5. Fibromyalgia.  6. GERD.  7. Bell's palsy.  8. Depression.     PAST SURGICAL HISTORY: Reviewed as above, unchanged.     MEDICATIONS: Reviewed, as per previous note, unchanged.     SOCIAL HISTORY: Reviewed. Negative for smoking. No alcohol use.     MEDICATIONS: Reviewed. Amlodipine 5 mg po qd, Toprol-XL 25 mg p.o. daily. Hydralazine 100 mg tid, s/p HCTZ 12.5 mg daily. Other medications were   carefully reviewed and noted and discussed with the patient.     REVIEW OF SYSTEMS: No recent hospitalizations.  GENERAL: Negative.  HEAD, EYES, EARS, NOSE, AND THROAT: Negative.  CARDIAC: Negative.  PULMONARY: Negative.  GASTROINTESTINAL: Negative.  GENITOURINARY: Negative.  PSYCHOLOGICAL: Negative.  NEUROLOGICAL: Negative.  ENDOCRINE: Negative.  HEMATOLOGIC AND ONCOLOGIC: Negative.  INFECTIOUS DISEASE: Negative.  The rest of the review of systems negative.     PHYSICAL EXAMINATION:  VITAL SIGNS: Repeat blood pressure is 120/84. Pulse is 84, weight is 64.5 Kg  GENERAL: She is cooperative, pleasant, in no acute distress.   Speech and thought process appropriate, normal.  HEENT: Mucous membranes moist.  NECK: Neck JVD. Normal hearing.  ABDOMEN: Soft, nontender.  Side and back: mid back, non tender, no sign of trauma  EXTREMITIES: no edema.     LABS: Reviewed. No recent labs.  BMP  Lab Results   Component Value Date     09/13/2018    K 4.8 09/17/2018     09/13/2018    CO2 24 09/13/2018    BUN 36 (H) 09/13/2018    CREATININE 1.6 (H) 09/13/2018    CALCIUM 9.0 09/13/2018    ANIONGAP 10 09/13/2018    ESTGFRAFRICA 39 (A) 09/13/2018    EGFRNONAA 34 (A) 09/13/2018     Lab Results   Component Value Date    .2 (H) 08/08/2018    CALCIUM 9.0 09/13/2018    CAION 1.13 09/05/2018             24 hour urine: Ca not measured, uric acid 138, Oxalate 20, citrate 203  U/a unremarkable  Urine Cx: neg   Urine P/Cr 740 mg     KUB: unremarkable, except for some  constipation     Renal u/s: FINDINGS:  Right kidney: The right kidney measures 9.6 cm. Mild cortical thinning and increased cortical echogenicity.  10 and 12 mm stones are seen in the upper and lower poles respectively.  No cortical thinning. No loss of corticomedullary distinction. No mass. No renal stone. No hydronephrosis.  Left kidney: The left kidney measures 9.3 cm. Mild cortical thinning and increased cortical echogenicity. No loss of corticomedullary distinction. No mass. No renal stone. No hydronephrosis.  The bladder is partially distended at the time of scanning and has an unremarkable appearance.     CT abd: The left kidney appears slightly small.  Right kidney is grossly normal in size.  No obvious hydronephrosis or nephrolithiasis        ASSESSMENT AND PLAN: This is a 63-year-old female who presents for follow up of nephrolithiasis and CKD   Patient has a h/o of heart transplant  The impression is as follows:     1. Right sided mid back pain: resolved. Asymptomatic today  Pt achieved dietary modification and stopped Vit C and Vit D, risk factors for kidney stones  Pt may have passed the stones  F/u CT did not show any stones  Pt saw Urology  U/s earlier showed non-obstructive 2 R stones, relatively large    Pt had multiple risk factors for both uric acid and Ca Oxalate stones  24 hour urine for kidney stone stratification shows hypocitraturia  Pt was advised NOT TO STOP cyclosporine.     Pt was again advised of dietary risk factors for kidney stones, was given printed literature  Advised pt to:  -keep water intake to >2 L/day  -keep salt in diet to <3 g/day  -reduce meat intake, specially seafood  -keep Ca intake average  -NoVit C  -No Vit D     2. Renal. 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 HTN: BP controlled  Reviewed meds with pt     4. History of heart transplant on  cyclosporine.  Per heart transplant team.     5. Proteinuria. Mild. Less than 1 g per day  Repeat is lower 740 mg.  Would benefit from an ACE inhibitor or angiotensin receptor blocker.   Will look at K on pending labs     6. Right mid back pain: not related urologically  Likely musculoskeletal        PLANS AND RECOMMENDATIONS:   As discussed above  RTC 4 months  Multiple issues adderssed  Will f/u with pending labs from today, including uric acid  Total time spent 40 minutes. Multiple issues addressed  Extensive time spent including the time to review the records, the patient evaluation, documentation, face-to-face  discussion with the patient. More than 50% of the time was spent in counseling  and coordination of care. Level V visit.     Carter Crawford MD

## 2018-10-29 NOTE — TELEPHONE ENCOUNTER
Pt had called stated that she has been issues with her toes and most likely needs a sherley for surgery, she took our fax number and will have them send information if needed.

## 2018-11-02 ENCOUNTER — TELEPHONE (OUTPATIENT)
Dept: INTERNAL MEDICINE | Facility: CLINIC | Age: 63
End: 2018-11-02

## 2018-11-02 DIAGNOSIS — M79.7 FIBROMYALGIA: ICD-10-CM

## 2018-11-02 RX ORDER — PREGABALIN 50 MG/1
50 CAPSULE ORAL 2 TIMES DAILY
Qty: 180 CAPSULE | Refills: 1 | Status: SHIPPED | OUTPATIENT
Start: 2018-11-02 | End: 2019-02-04 | Stop reason: SDUPTHER

## 2018-11-02 RX ORDER — PREGABALIN 50 MG/1
50 CAPSULE ORAL 2 TIMES DAILY
Qty: 60 CAPSULE | Refills: 1 | Status: SHIPPED | OUTPATIENT
Start: 2018-11-02 | End: 2018-11-02 | Stop reason: SDUPTHER

## 2018-11-02 NOTE — TELEPHONE ENCOUNTER
----- Message from Olya Staples sent at 2018  9:08 AM CDT -----  Contact: pt  She's calling to see if she can get a 7 day refill due to [facility] having her medication on back order...she also stated that [facility] will need a new Rx sent as well because it has ...    1. What is the name of the medication you are requesting? Lyrica  2. What is the dose? 50 mg  3. How do you take the medication? Orally, topically, etc? orally  4. How often do you take this medication? Twice daily  5. Do you need a 30 day or 90 day supply? 7-day  6. How many refills are you requesting? 1  7. What is your preferred pharmacy and location of the pharmacy? [facility] on [address]  8. Who can we contact with further questions? [phone] (home)

## 2018-11-02 NOTE — TELEPHONE ENCOUNTER
[facility] has Lyrica on back order and pt would like to get the medicine sent local until it comes in. Please advise.

## 2018-11-05 ENCOUNTER — TELEPHONE (OUTPATIENT)
Dept: INTERNAL MEDICINE | Facility: CLINIC | Age: 63
End: 2018-11-05

## 2018-11-05 DIAGNOSIS — M79.7 FIBROMYALGIA: ICD-10-CM

## 2018-11-05 RX ORDER — PREGABALIN 50 MG/1
50 CAPSULE ORAL 2 TIMES DAILY
Qty: 180 CAPSULE | Refills: 1 | Status: CANCELLED | OUTPATIENT
Start: 2018-11-05

## 2018-11-05 NOTE — TELEPHONE ENCOUNTER
Patient called stating that prescription that was sent to [facility] was not received.  Patient stated that she needs prescription to be sent in.    Per encounter on 11/02/2018 prescription was sent to local pharmacy and mail order.  Please advise.

## 2018-11-05 NOTE — TELEPHONE ENCOUNTER
----- Message from Mireya Burk sent at 11/5/2018  3:44 PM CST -----  Contact: pt  Pt request call back regarding RX not ready at pharmacy ([facility])      [phone] (home)

## 2018-11-05 NOTE — TELEPHONE ENCOUNTER
----- Message from Sonido Vargas sent at 11/5/2018  2:08 PM CST -----  Contact: pt   Pt calling to check on lyrica temp script being sent for 7 days.       ..[phone] (home)       [id number] [place] - [address] AT [address]  [place] [address]  Phone: [phone] Fax: [phone]

## 2018-11-05 NOTE — TELEPHONE ENCOUNTER
Spoke to patient and advised that prescriptions were sent in to the pharmacy.  Patient verbally understood.

## 2018-11-06 ENCOUNTER — TELEPHONE (OUTPATIENT)
Dept: TRANSPLANT | Facility: CLINIC | Age: 63
End: 2018-11-06

## 2018-11-06 NOTE — TELEPHONE ENCOUNTER
Pharmacy states it was never received.  A verbal order was given for the # 60 with 1 refill, 50 mg b.i.d..

## 2018-11-21 ENCOUNTER — OUTPATIENT CASE MANAGEMENT (OUTPATIENT)
Dept: ADMINISTRATIVE | Facility: OTHER | Age: 63
End: 2018-11-21

## 2018-11-21 NOTE — PROGRESS NOTES
11/21/18 - SUMMARY: Phone contact made with pt today for follow up with OPCM. She had bunion repair surgery to her R foot on 11/19/18 and is now back home. She had significant pain to the area yesterday, but was in contact with the surgeon and was given instructions to increase pain meds and apply ice compresses, which was effective in managing pain. She has a knee scooter and feels she has adequate pain control currently using narc analgesics, along with intermittent tylenol. She has seen [facility] nephro provider since this CM's last contact and had adjustments made to meds and diet, including d/c of calcium and Vit D supplements and no more sports drinks. She has been compliant with this. She has not yet contacted [facility] to set up an information visit, but does still have the brochure mailed by this CM.   INTERVENTION: Discussed [facility] program and encouraged set up of informative class when recuperated from current surgery. Discussed med and diet adjustments made by nephrologist at recent appt and she verbalized good understanding.   PLAN: Follow up in two weeks related to acute status of foot surgery two days ago and current poorly controlled pain. Does she access [employer] programs and, if not, could she benefit from them? D/C if no new problems.     Todays OPCM Self-Management Care Plan was reviewed with the patients/caregivers input based on identified barriers from todays and previous assessments.  Goals were reviewed today with the patient/caregiver and the patient has agreed to continue to work towards these goals to improve his/her overall well-being. Patient verbalized understanding of the care plan, goals, and all of today's instructions. Encouraged patient/caregiver to communicate with his/her physician and health care team about health conditions and the treatment plan.  Provided my contact information today and encouraged patient/caregiver to call me with any questions as needed. Geri  Yifan JACKSON

## 2018-11-23 DIAGNOSIS — I10 ESSENTIAL HYPERTENSION: Chronic | ICD-10-CM

## 2018-11-24 RX ORDER — METOPROLOL SUCCINATE 25 MG/1
TABLET, EXTENDED RELEASE ORAL
Qty: 90 TABLET | Refills: 3 | Status: SHIPPED | OUTPATIENT
Start: 2018-11-24 | End: 2019-09-02 | Stop reason: SDUPTHER

## 2018-12-05 ENCOUNTER — OUTPATIENT CASE MANAGEMENT (OUTPATIENT)
Dept: ADMINISTRATIVE | Facility: OTHER | Age: 63
End: 2018-12-05

## 2018-12-05 NOTE — PROGRESS NOTES
12/5/18 - SUMMARY: Phone contact made with pt today for follow up and d/c from OPC. She advised she continues to improve following recent bunion repair surgery to R foot. She reports having a very good day yesterday and feels good today as well. She has been able to manage the pain with tylenol, not requiring any narcotic analgesics since CM's last contact. She has a follow up appt with the surgeon today to get the stitches out as well as the pin that was placed temporarily. She denies any other problems currently. She is accessing both the OTC and transportation benefits thru [employer]. Confirmed that she has the brochure sent from this  about the [facility] class and will follow up with this in the future. She does have it and reports good compliance with following all diet and fluid recommendations for maintaining kidney health.  INTERVENTION: Advised CM will d/c from OPC today related to goals met.  Encouraged her to follow up with the appropriate provider for any symptoms causing concern in the future.  PLAN: D/C today.     Todays [facility] Self-Management Care Plan was reviewed with the patients/caregivers input based on identified barriers from todays and previous assessments.  Goals were reviewed today with the patient/caregiver and the patient has agreed to continue to work towards these goals to improve his/her overall well-being. Patient verbalized understanding of the care plan, goals, and all of today's instructions. Encouraged patient/caregiver to communicate with his/her physician and health care team about health conditions and the treatment plan.  Provided my contact information today and encouraged patient/caregiver to call me with any questions as needed. Geri Saha RN

## 2018-12-06 DIAGNOSIS — M79.7 FIBROMYALGIA: ICD-10-CM

## 2018-12-08 RX ORDER — DULOXETIN HYDROCHLORIDE 30 MG/1
CAPSULE, DELAYED RELEASE ORAL
Qty: 90 CAPSULE | Refills: 1 | Status: SHIPPED | OUTPATIENT
Start: 2018-12-08 | End: 2019-04-22 | Stop reason: SDUPTHER

## 2018-12-08 RX ORDER — BUSPIRONE HYDROCHLORIDE 10 MG/1
TABLET ORAL
Qty: 90 TABLET | Refills: 1 | Status: SHIPPED | OUTPATIENT
Start: 2018-12-08 | End: 2019-04-22 | Stop reason: SDUPTHER

## 2018-12-27 ENCOUNTER — TELEPHONE (OUTPATIENT)
Dept: TRANSPLANT | Facility: CLINIC | Age: 63
End: 2018-12-27

## 2018-12-27 NOTE — TELEPHONE ENCOUNTER
Spoke with pt and added post heart tx labs to the labs scheduled in Feb, she states that her foot is improving .

## 2019-01-02 ENCOUNTER — OFFICE VISIT (OUTPATIENT)
Dept: INTERNAL MEDICINE | Facility: CLINIC | Age: 64
End: 2019-01-02
Payer: MEDICARE

## 2019-01-02 VITALS
DIASTOLIC BLOOD PRESSURE: 85 MMHG | HEART RATE: 96 BPM | TEMPERATURE: 100 F | SYSTOLIC BLOOD PRESSURE: 124 MMHG | WEIGHT: 137.56 LBS | OXYGEN SATURATION: 99 % | BODY MASS INDEX: 25.16 KG/M2

## 2019-01-02 DIAGNOSIS — J02.9 ACUTE PHARYNGITIS, UNSPECIFIED ETIOLOGY: Primary | ICD-10-CM

## 2019-01-02 PROCEDURE — 3079F DIAST BP 80-89 MM HG: CPT | Mod: CPTII,HCNC,S$GLB, | Performed by: FAMILY MEDICINE

## 2019-01-02 PROCEDURE — 99213 OFFICE O/P EST LOW 20 MIN: CPT | Mod: 25,HCNC,S$GLB, | Performed by: FAMILY MEDICINE

## 2019-01-02 PROCEDURE — 3079F PR MOST RECENT DIASTOLIC BLOOD PRESSURE 80-89 MM HG: ICD-10-PCS | Mod: CPTII,HCNC,S$GLB, | Performed by: FAMILY MEDICINE

## 2019-01-02 PROCEDURE — 3008F BODY MASS INDEX DOCD: CPT | Mod: CPTII,HCNC,S$GLB, | Performed by: FAMILY MEDICINE

## 2019-01-02 PROCEDURE — 96372 THER/PROPH/DIAG INJ SC/IM: CPT | Mod: HCNC,S$GLB,, | Performed by: FAMILY MEDICINE

## 2019-01-02 PROCEDURE — 3074F SYST BP LT 130 MM HG: CPT | Mod: CPTII,HCNC,S$GLB, | Performed by: FAMILY MEDICINE

## 2019-01-02 PROCEDURE — 99213 PR OFFICE/OUTPT VISIT, EST, LEVL III, 20-29 MIN: ICD-10-PCS | Mod: 25,HCNC,S$GLB, | Performed by: FAMILY MEDICINE

## 2019-01-02 PROCEDURE — 99999 PR PBB SHADOW E&M-EST. PATIENT-LVL V: ICD-10-PCS | Mod: PBBFAC,HCNC,, | Performed by: FAMILY MEDICINE

## 2019-01-02 PROCEDURE — 3008F PR BODY MASS INDEX (BMI) DOCUMENTED: ICD-10-PCS | Mod: CPTII,HCNC,S$GLB, | Performed by: FAMILY MEDICINE

## 2019-01-02 PROCEDURE — 99999 PR PBB SHADOW E&M-EST. PATIENT-LVL V: CPT | Mod: PBBFAC,HCNC,, | Performed by: FAMILY MEDICINE

## 2019-01-02 PROCEDURE — 96372 PR INJECTION,THERAP/PROPH/DIAG2ST, IM OR SUBCUT: ICD-10-PCS | Mod: HCNC,S$GLB,, | Performed by: FAMILY MEDICINE

## 2019-01-02 PROCEDURE — 3074F PR MOST RECENT SYSTOLIC BLOOD PRESSURE < 130 MM HG: ICD-10-PCS | Mod: CPTII,HCNC,S$GLB, | Performed by: FAMILY MEDICINE

## 2019-01-02 RX ORDER — ACETAMINOPHEN 500MG/15ML
LIQUID (ML) ORAL
COMMUNITY
Start: 2018-12-19 | End: 2021-11-12 | Stop reason: SDUPTHER

## 2019-01-02 RX ORDER — DEXAMETHASONE SODIUM PHOSPHATE 4 MG/ML
2 INJECTION, SOLUTION INTRA-ARTICULAR; INTRALESIONAL; INTRAMUSCULAR; INTRAVENOUS; SOFT TISSUE
Status: DISCONTINUED | OUTPATIENT
Start: 2019-01-02 | End: 2019-01-02

## 2019-01-02 RX ORDER — METHYLPREDNISOLONE 4 MG/1
TABLET ORAL
Qty: 1 PACKAGE | Refills: 0 | Status: SHIPPED | OUTPATIENT
Start: 2019-01-02 | End: 2019-01-09 | Stop reason: ALTCHOICE

## 2019-01-02 RX ORDER — DEXAMETHASONE SODIUM PHOSPHATE 100 MG/10ML
5 INJECTION INTRAMUSCULAR; INTRAVENOUS ONCE
Status: COMPLETED | OUTPATIENT
Start: 2019-01-02 | End: 2019-01-02

## 2019-01-02 RX ORDER — CARBAMIDE PEROXIDE 6.5 %
DROPS OTIC (EAR)
COMMUNITY
Start: 2018-12-19 | End: 2019-05-29

## 2019-01-02 RX ADMIN — DEXAMETHASONE SODIUM PHOSPHATE 5 MG: 100 INJECTION INTRAMUSCULAR; INTRAVENOUS at 03:01

## 2019-01-02 NOTE — PATIENT INSTRUCTIONS
Self-Care for Sore Throats    Sore throats happen for many reasons, such as colds, allergies, and infections caused by viruses or bacteria. In any case, your throat becomes red and sore. Your goal for self-care is to reduce your discomfort while giving your throat a chance to heal.  Moisten and soothe your throat  Tips include the following:  · Try a sip of water first thing after waking up.  · Keep your throat moist by drinking 6 or more glasses of clear liquids every day.  · Run a cool-air humidifier in your room overnight.  · Avoid cigarette smoke.   · Suck on throat lozenges, cough drops, hard candy, ice chips, or frozen fruit-juice bars. Use the sugar-free versions if your diet or medical condition requires them.  Gargle to ease irritation  Gargling every hour or 2 can ease irritation. Try gargling with 1 of these solutions:  · 1/4 teaspoon of salt in 1/2 cup of warm water  · An over-the-counter anesthetic gargle  Use medicine for more relief  Over-the-counter medicine can reduce sore throat symptoms. Ask your pharmacist if you have questions about which medicine to use:  · Ease pain with anesthetic sprays. Aspirin or an aspirin substitute also helps. Remember, never give aspirin to anyone 18 or younger, or if you are already taking blood thinners.   · For sore throats caused by allergies, try antihistamines to block the allergic reaction.  · Remember: unless a sore throat is caused by a bacterial infection, antibiotics wont help you.  Prevent future sore throats  Prevention tips include the following:  · Stop smoking or reduce contact with secondhand smoke. Smoke irritates the tender throat lining.  · Limit contact with pets and with allergy-causing substances, such as pollen and mold.  · When youre around someone with a sore throat or cold, wash your hands often to keep viruses or bacteria from spreading.  · Dont strain your vocal cords.  Call your healthcare provider  Contact your healthcare provider if  you have:  · A temperature over 101°F (38.3°C)  · White spots on the throat  · Great difficulty swallowing  · Trouble breathing  · A skin rash  · Recent exposure to someone else with strep bacteria  · Severe hoarseness and swollen glands in the neck or jaw   Date Last Reviewed: 8/1/2016  © 5744-2352 [employer]. [address] [place] [address]. All rights reserved. This information is not intended as a substitute for professional medical care. Always follow your healthcare professional's instructions.

## 2019-01-02 NOTE — PROGRESS NOTES
Subjective:       Patient ID: Nadia Damon is a 63 y.o. female.    Chief Complaint: Sore Throat and Hoarse    HPI  Onset Saturday  Taking tylenol extra strength  Sore throat   Difficulty swallowing  Decreased PO intake  Denies fever  coriciden HBP, relieved sore throat  Halls but difficulty swallowing  Having difficulty taking meds d/t pain  Recovering well from pin in great left toe    Nephrology discontinued vitamin c  Protein found in urine    Unable to  meds today  Will not get paid until tomorrow  Review of Systems   Constitutional: Positive for appetite change. Negative for fever.   HENT: Positive for congestion, sore throat and voice change.    Gastrointestinal: Negative for diarrhea, nausea and vomiting.   Genitourinary: Negative for dysuria.   Musculoskeletal: Positive for neck pain.        Objective:   /85   Pulse 96   Temp 99.8 °F (37.7 °C) (Oral)   Wt 62.4 kg (137 lb 9.1 oz)   LMP 06/20/1983 (Within Years)   SpO2 99%   BMI 25.16 kg/m²     Physical Exam   Constitutional: She is oriented to person, place, and time. She appears well-developed and well-nourished.  Non-toxic appearance. She does not have a sickly appearance. She does not appear ill. No distress.   HENT:   Head: Normocephalic and atraumatic.   Right Ear: A middle ear effusion is present.   Left Ear: A middle ear effusion is present.   Nose: Mucosal edema and rhinorrhea present.   Mouth/Throat: Posterior oropharyngeal edema and posterior oropharyngeal erythema present. No oropharyngeal exudate.   Eyes: Conjunctivae and EOM are normal. No scleral icterus.   Neck: Normal range of motion. Neck supple.   Cardiovascular: Normal rate, regular rhythm and normal heart sounds.   Pulmonary/Chest: Effort normal and breath sounds normal. She has no wheezes.   Abdominal: Soft. Bowel sounds are normal. There is no tenderness.   Musculoskeletal: She exhibits no edema or deformity.   Neurological: She is alert and oriented to person,  place, and time.   Skin: Skin is warm and dry.   Psychiatric: She has a normal mood and affect. Her behavior is normal.   Vitals reviewed.    Assessment:     1. Acute pharyngitis, unspecified etiology      Plan:     Problem List Items Addressed This Visit     None      Visit Diagnoses     Acute pharyngitis, unspecified etiology    -  Primary    Relevant Medications    methylPREDNISolone (MEDROL DOSEPACK) 4 mg tablet    dexamethasone injection 5 mg (Start on 1/2/2019  4:45 PM)        Push fluids  Tylenol for analgesia  Unable to take NSAID d/t CKD  Decadron injection, low dose as unable to  meds today. Difficulty w/ PO intake  Has received decadron injection in Nov 2018 and April 2018. Mildly elevated glucose (119) on last CMP in September  Follow-up in about 1 week (around 1/9/2019).

## 2019-01-09 ENCOUNTER — OFFICE VISIT (OUTPATIENT)
Dept: INTERNAL MEDICINE | Facility: CLINIC | Age: 64
End: 2019-01-09
Payer: MEDICARE

## 2019-01-09 VITALS
HEART RATE: 91 BPM | OXYGEN SATURATION: 99 % | SYSTOLIC BLOOD PRESSURE: 90 MMHG | DIASTOLIC BLOOD PRESSURE: 60 MMHG | HEIGHT: 62 IN | WEIGHT: 136.56 LBS | TEMPERATURE: 98 F | BODY MASS INDEX: 25.13 KG/M2

## 2019-01-09 DIAGNOSIS — I95.9 HYPOTENSION, UNSPECIFIED HYPOTENSION TYPE: Primary | ICD-10-CM

## 2019-01-09 DIAGNOSIS — R42 LIGHTHEADEDNESS: ICD-10-CM

## 2019-01-09 DIAGNOSIS — Z94.1 HEART TRANSPLANTED: ICD-10-CM

## 2019-01-09 DIAGNOSIS — F33.9 CHRONIC MAJOR DEPRESSIVE DISORDER, RECURRENT EPISODE: ICD-10-CM

## 2019-01-09 DIAGNOSIS — J02.9 ACUTE PHARYNGITIS, UNSPECIFIED ETIOLOGY: ICD-10-CM

## 2019-01-09 DIAGNOSIS — D84.9 IMMUNOCOMPROMISED PATIENT: ICD-10-CM

## 2019-01-09 DIAGNOSIS — N18.4 CKD (CHRONIC KIDNEY DISEASE) STAGE 4, GFR 15-29 ML/MIN: ICD-10-CM

## 2019-01-09 DIAGNOSIS — I70.0 AORTIC ATHEROSCLEROSIS: ICD-10-CM

## 2019-01-09 DIAGNOSIS — I10 ESSENTIAL HYPERTENSION: ICD-10-CM

## 2019-01-09 PROBLEM — T14.8XXA BRUISED: Status: RESOLVED | Noted: 2018-09-17 | Resolved: 2019-01-09

## 2019-01-09 PROBLEM — Z01.818 PREOPERATIVE CLEARANCE: Status: RESOLVED | Noted: 2018-10-22 | Resolved: 2019-01-09

## 2019-01-09 PROBLEM — R11.10 VOMITING: Status: RESOLVED | Noted: 2018-02-15 | Resolved: 2019-01-09

## 2019-01-09 PROBLEM — R07.81 RIB PAIN ON RIGHT SIDE: Status: RESOLVED | Noted: 2018-04-18 | Resolved: 2019-01-09

## 2019-01-09 PROCEDURE — 3008F BODY MASS INDEX DOCD: CPT | Mod: CPTII,HCNC,S$GLB, | Performed by: FAMILY MEDICINE

## 2019-01-09 PROCEDURE — 3078F DIAST BP <80 MM HG: CPT | Mod: CPTII,HCNC,S$GLB, | Performed by: FAMILY MEDICINE

## 2019-01-09 PROCEDURE — 3078F PR MOST RECENT DIASTOLIC BLOOD PRESSURE < 80 MM HG: ICD-10-PCS | Mod: CPTII,HCNC,S$GLB, | Performed by: FAMILY MEDICINE

## 2019-01-09 PROCEDURE — 99999 PR PBB SHADOW E&M-EST. PATIENT-LVL V: CPT | Mod: PBBFAC,HCNC,, | Performed by: FAMILY MEDICINE

## 2019-01-09 PROCEDURE — 3008F PR BODY MASS INDEX (BMI) DOCUMENTED: ICD-10-PCS | Mod: CPTII,HCNC,S$GLB, | Performed by: FAMILY MEDICINE

## 2019-01-09 PROCEDURE — 99214 PR OFFICE/OUTPT VISIT, EST, LEVL IV, 30-39 MIN: ICD-10-PCS | Mod: HCNC,S$GLB,, | Performed by: FAMILY MEDICINE

## 2019-01-09 PROCEDURE — 3074F SYST BP LT 130 MM HG: CPT | Mod: CPTII,HCNC,S$GLB, | Performed by: FAMILY MEDICINE

## 2019-01-09 PROCEDURE — 3074F PR MOST RECENT SYSTOLIC BLOOD PRESSURE < 130 MM HG: ICD-10-PCS | Mod: CPTII,HCNC,S$GLB, | Performed by: FAMILY MEDICINE

## 2019-01-09 PROCEDURE — 99999 PR PBB SHADOW E&M-EST. PATIENT-LVL V: ICD-10-PCS | Mod: PBBFAC,HCNC,, | Performed by: FAMILY MEDICINE

## 2019-01-09 PROCEDURE — 99214 OFFICE O/P EST MOD 30 MIN: CPT | Mod: HCNC,S$GLB,, | Performed by: FAMILY MEDICINE

## 2019-01-09 NOTE — PROGRESS NOTES
Subjective:       Patient ID: Nadia Damon is a 63 y.o. female.    Chief Complaint: Follow-up    One-week follow-up upper respiratory infection.  She feels much improved and her symptoms have resolved.  Today however she feels lightheaded and weak.  She is on metoprolol daily and apresoline 100 mg 3 times a day.  Medical history includes chronic kidney disease stage 4 hypertension heart transplant.  She denies headache chest pain palpitations shortness of breath or edema. She denies nausea vomiting or diarrhea.  She reports she has taken medicine regularly and eating well.  She was previously on pain medication for a foot problem but has not taken any for number of days now.      Review of Systems   Constitutional: Negative for appetite change, chills, diaphoresis, fatigue and fever.   HENT: Negative for congestion, postnasal drip, sinus pressure and sore throat.    Respiratory: Negative for cough, chest tightness, shortness of breath and wheezing.    Cardiovascular: Negative for chest pain, palpitations and leg swelling.        Feels lightheaded.  Blood pressure recheck 90/60 her pulses 90 and regular   Gastrointestinal: Negative for abdominal pain, diarrhea, nausea and vomiting.   Genitourinary: Negative for difficulty urinating, dysuria, frequency and urgency.   Neurological: Positive for weakness and light-headedness. Negative for syncope and headaches.       Objective:      Physical Exam   Constitutional: She is oriented to person, place, and time. She appears well-developed and well-nourished. No distress.   HENT:   Right Ear: External ear normal.   Left Ear: External ear normal.   Nose: Nose normal.   Mouth/Throat: Oropharynx is clear and moist. No oropharyngeal exudate.   Eyes: Pupils are equal, round, and reactive to light.   Neck: No JVD present.   Cardiovascular: Normal rate and regular rhythm. Exam reveals no gallop.   No murmur heard.  Pulmonary/Chest: Effort normal. No respiratory distress. She has  no wheezes. She has no rales.   Abdominal: Soft. Bowel sounds are normal. There is no tenderness.   Lymphadenopathy:     She has no cervical adenopathy.   Neurological: She is alert and oriented to person, place, and time.   Skin: She is not diaphoretic.       Lab Visit on 10/26/2018   Component Date Value Ref Range Status    Uric Acid 10/26/2018 6.2* 2.4 - 5.7 mg/dL Final    Sodium 10/26/2018 135* 136 - 145 mmol/L Final    Potassium 10/26/2018 5.1  3.5 - 5.1 mmol/L Final    Chloride 10/26/2018 101  95 - 110 mmol/L Final    CO2 10/26/2018 27  23 - 29 mmol/L Final    Glucose 10/26/2018 83  70 - 110 mg/dL Final    BUN, Bld 10/26/2018 28* 8 - 23 mg/dL Final    Creatinine 10/26/2018 1.6* 0.5 - 1.4 mg/dL Final    Calcium 10/26/2018 9.4  8.7 - 10.5 mg/dL Final    Anion Gap 10/26/2018 7* 8 - 16 mmol/L Final    eGFR if  10/26/2018 39.0* >60 mL/min/1.73 m^2 Final    eGFR if non  10/26/2018 33.8* >60 mL/min/1.73 m^2 Final     Assessment:       No diagnosis found.    Plan:     Medication reviewed.  Blood pressure 90/60.  She has had no changes in her usual medications.  Medications include Cymbalta for depression.  Recommend continue metoprolol and monitor blood pressure 3 times a day.  Hold apresoline unless systolic blood pressure 140 or greater and then take 1/2 tab or 50 mg.  Stay home today rest call if needed follow-up in 1 week    There are no diagnoses linked to this encounter.

## 2019-01-16 ENCOUNTER — OFFICE VISIT (OUTPATIENT)
Dept: INTERNAL MEDICINE | Facility: CLINIC | Age: 64
End: 2019-01-16
Payer: MEDICARE

## 2019-01-16 VITALS
HEART RATE: 95 BPM | WEIGHT: 139.44 LBS | TEMPERATURE: 98 F | SYSTOLIC BLOOD PRESSURE: 102 MMHG | OXYGEN SATURATION: 97 % | BODY MASS INDEX: 25.66 KG/M2 | HEIGHT: 62 IN | DIASTOLIC BLOOD PRESSURE: 58 MMHG

## 2019-01-16 DIAGNOSIS — Z94.1 HEART TRANSPLANTED: ICD-10-CM

## 2019-01-16 DIAGNOSIS — I95.9 HYPOTENSION, UNSPECIFIED HYPOTENSION TYPE: Primary | ICD-10-CM

## 2019-01-16 DIAGNOSIS — N18.4 CKD (CHRONIC KIDNEY DISEASE) STAGE 4, GFR 15-29 ML/MIN: ICD-10-CM

## 2019-01-16 LAB
ALBUMIN SERPL BCP-MCNC: 3.1 G/DL
ALP SERPL-CCNC: 116 U/L
ALT SERPL W/O P-5'-P-CCNC: 24 U/L
ANION GAP SERPL CALC-SCNC: 8 MMOL/L
AST SERPL-CCNC: 34 U/L
BASOPHILS # BLD AUTO: 0.01 K/UL
BASOPHILS NFR BLD: 0.2 %
BILIRUB SERPL-MCNC: 0.5 MG/DL
BUN SERPL-MCNC: 38 MG/DL
CALCIUM SERPL-MCNC: 9.3 MG/DL
CHLORIDE SERPL-SCNC: 105 MMOL/L
CO2 SERPL-SCNC: 26 MMOL/L
CREAT SERPL-MCNC: 1.8 MG/DL
DIFFERENTIAL METHOD: ABNORMAL
EOSINOPHIL # BLD AUTO: 0.1 K/UL
EOSINOPHIL NFR BLD: 2 %
ERYTHROCYTE [DISTWIDTH] IN BLOOD BY AUTOMATED COUNT: 14.4 %
EST. GFR  (AFRICAN AMERICAN): 33.8 ML/MIN/1.73 M^2
EST. GFR  (NON AFRICAN AMERICAN): 29.3 ML/MIN/1.73 M^2
GLUCOSE SERPL-MCNC: 123 MG/DL
HCT VFR BLD AUTO: 33.2 %
HGB BLD-MCNC: 10.6 G/DL
IMM GRANULOCYTES # BLD AUTO: 0.01 K/UL
IMM GRANULOCYTES NFR BLD AUTO: 0.2 %
LYMPHOCYTES # BLD AUTO: 1.2 K/UL
LYMPHOCYTES NFR BLD: 22.5 %
MCH RBC QN AUTO: 28.3 PG
MCHC RBC AUTO-ENTMCNC: 31.9 G/DL
MCV RBC AUTO: 89 FL
MONOCYTES # BLD AUTO: 0.5 K/UL
MONOCYTES NFR BLD: 9.4 %
NEUTROPHILS # BLD AUTO: 3.4 K/UL
NEUTROPHILS NFR BLD: 65.7 %
NRBC BLD-RTO: 0 /100 WBC
PLATELET # BLD AUTO: 267 K/UL
PMV BLD AUTO: 10.4 FL
POTASSIUM SERPL-SCNC: 5.3 MMOL/L
PROT SERPL-MCNC: 7.8 G/DL
RBC # BLD AUTO: 3.74 M/UL
SODIUM SERPL-SCNC: 139 MMOL/L
WBC # BLD AUTO: 5.12 K/UL

## 2019-01-16 PROCEDURE — 85025 COMPLETE CBC W/AUTO DIFF WBC: CPT | Mod: HCNC

## 2019-01-16 PROCEDURE — 3074F SYST BP LT 130 MM HG: CPT | Mod: CPTII,HCNC,S$GLB, | Performed by: FAMILY MEDICINE

## 2019-01-16 PROCEDURE — 99999 PR PBB SHADOW E&M-EST. PATIENT-LVL V: ICD-10-PCS | Mod: PBBFAC,HCNC,, | Performed by: FAMILY MEDICINE

## 2019-01-16 PROCEDURE — 3008F PR BODY MASS INDEX (BMI) DOCUMENTED: ICD-10-PCS | Mod: CPTII,HCNC,S$GLB, | Performed by: FAMILY MEDICINE

## 2019-01-16 PROCEDURE — 99213 PR OFFICE/OUTPT VISIT, EST, LEVL III, 20-29 MIN: ICD-10-PCS | Mod: HCNC,S$GLB,, | Performed by: FAMILY MEDICINE

## 2019-01-16 PROCEDURE — 99999 PR PBB SHADOW E&M-EST. PATIENT-LVL V: CPT | Mod: PBBFAC,HCNC,, | Performed by: FAMILY MEDICINE

## 2019-01-16 PROCEDURE — 3074F PR MOST RECENT SYSTOLIC BLOOD PRESSURE < 130 MM HG: ICD-10-PCS | Mod: CPTII,HCNC,S$GLB, | Performed by: FAMILY MEDICINE

## 2019-01-16 PROCEDURE — 99213 OFFICE O/P EST LOW 20 MIN: CPT | Mod: HCNC,S$GLB,, | Performed by: FAMILY MEDICINE

## 2019-01-16 PROCEDURE — 3078F DIAST BP <80 MM HG: CPT | Mod: CPTII,HCNC,S$GLB, | Performed by: FAMILY MEDICINE

## 2019-01-16 PROCEDURE — 80053 COMPREHEN METABOLIC PANEL: CPT | Mod: HCNC

## 2019-01-16 PROCEDURE — 3008F BODY MASS INDEX DOCD: CPT | Mod: CPTII,HCNC,S$GLB, | Performed by: FAMILY MEDICINE

## 2019-01-16 PROCEDURE — 3078F PR MOST RECENT DIASTOLIC BLOOD PRESSURE < 80 MM HG: ICD-10-PCS | Mod: CPTII,HCNC,S$GLB, | Performed by: FAMILY MEDICINE

## 2019-01-16 NOTE — PROGRESS NOTES
Subjective:       Patient ID: Nadia Damon is a 63 y.o. female.    Chief Complaint: Follow-up    Follow-up hypotension.  She feels somewhat lightheaded.  This has been associated with recent left foot surgery and inactivity.  She has not started ambulate.  Blood pressures at have been 99/71 106/69 126/89.  She denies chest pain palpitations shortness of breath edema. A press sling 100 mg 3 times a day was discontinued last visit.  She continues on amlodipine 5 mg daily and metoprolol daily.      Review of Systems   Constitutional: Positive for activity change and fatigue. Negative for appetite change and unexpected weight change.   Respiratory: Negative for cough and shortness of breath.    Cardiovascular: Negative for chest pain, palpitations and leg swelling.   Gastrointestinal: Positive for nausea. Negative for diarrhea and vomiting.       Objective:      Physical Exam   Constitutional: She appears well-developed and well-nourished. No distress.   Cardiovascular: Normal rate and regular rhythm.   No murmur heard.  Pulmonary/Chest: Effort normal and breath sounds normal.       Lab Visit on 10/26/2018   Component Date Value Ref Range Status    Uric Acid 10/26/2018 6.2* 2.4 - 5.7 mg/dL Final    Sodium 10/26/2018 135* 136 - 145 mmol/L Final    Potassium 10/26/2018 5.1  3.5 - 5.1 mmol/L Final    Chloride 10/26/2018 101  95 - 110 mmol/L Final    CO2 10/26/2018 27  23 - 29 mmol/L Final    Glucose 10/26/2018 83  70 - 110 mg/dL Final    BUN, Bld 10/26/2018 28* 8 - 23 mg/dL Final    Creatinine 10/26/2018 1.6* 0.5 - 1.4 mg/dL Final    Calcium 10/26/2018 9.4  8.7 - 10.5 mg/dL Final    Anion Gap 10/26/2018 7* 8 - 16 mmol/L Final    eGFR if  10/26/2018 39.0* >60 mL/min/1.73 m^2 Final    eGFR if non  10/26/2018 33.8* >60 mL/min/1.73 m^2 Final     Assessment:       1. Hypotension, unspecified hypotension type        Plan:     Blood pressure recheck 106/64 right arm sitting.  CBC  CMP ordered.  Continue current medications increased activity.  Follow-up in 2 weeks.    Hypotension, unspecified hypotension type  -     CBC auto differential  -     Comprehensive metabolic panel

## 2019-01-22 ENCOUNTER — TELEPHONE (OUTPATIENT)
Dept: INTERNAL MEDICINE | Facility: CLINIC | Age: 64
End: 2019-01-22

## 2019-01-22 NOTE — TELEPHONE ENCOUNTER
----- Message from Blanca Calloway sent at 1/22/2019  3:48 PM CST -----  Contact: [phone]  Would like to consult with the nurse, patients states  she had labs done and would like to speak with the nurse concerning the result, please call back at [phone] thanks sj

## 2019-02-04 ENCOUNTER — OFFICE VISIT (OUTPATIENT)
Dept: INTERNAL MEDICINE | Facility: CLINIC | Age: 64
End: 2019-02-04
Payer: MEDICARE

## 2019-02-04 VITALS
WEIGHT: 144.5 LBS | DIASTOLIC BLOOD PRESSURE: 64 MMHG | BODY MASS INDEX: 26.59 KG/M2 | TEMPERATURE: 99 F | SYSTOLIC BLOOD PRESSURE: 118 MMHG | HEIGHT: 62 IN | OXYGEN SATURATION: 100 % | HEART RATE: 96 BPM

## 2019-02-04 DIAGNOSIS — I10 ESSENTIAL HYPERTENSION: ICD-10-CM

## 2019-02-04 DIAGNOSIS — G47.00 INSOMNIA, UNSPECIFIED TYPE: ICD-10-CM

## 2019-02-04 DIAGNOSIS — I95.9 HYPOTENSION, UNSPECIFIED HYPOTENSION TYPE: Primary | ICD-10-CM

## 2019-02-04 DIAGNOSIS — M79.7 FIBROMYALGIA: ICD-10-CM

## 2019-02-04 PROCEDURE — 3074F SYST BP LT 130 MM HG: CPT | Mod: CPTII,S$GLB,, | Performed by: FAMILY MEDICINE

## 2019-02-04 PROCEDURE — 3078F PR MOST RECENT DIASTOLIC BLOOD PRESSURE < 80 MM HG: ICD-10-PCS | Mod: CPTII,S$GLB,, | Performed by: FAMILY MEDICINE

## 2019-02-04 PROCEDURE — 3008F PR BODY MASS INDEX (BMI) DOCUMENTED: ICD-10-PCS | Mod: CPTII,S$GLB,, | Performed by: FAMILY MEDICINE

## 2019-02-04 PROCEDURE — 3078F DIAST BP <80 MM HG: CPT | Mod: CPTII,S$GLB,, | Performed by: FAMILY MEDICINE

## 2019-02-04 PROCEDURE — 99999 PR PBB SHADOW E&M-EST. PATIENT-LVL III: CPT | Mod: PBBFAC,HCNC,, | Performed by: FAMILY MEDICINE

## 2019-02-04 PROCEDURE — 99213 PR OFFICE/OUTPT VISIT, EST, LEVL III, 20-29 MIN: ICD-10-PCS | Mod: S$GLB,,, | Performed by: FAMILY MEDICINE

## 2019-02-04 PROCEDURE — 3074F PR MOST RECENT SYSTOLIC BLOOD PRESSURE < 130 MM HG: ICD-10-PCS | Mod: CPTII,S$GLB,, | Performed by: FAMILY MEDICINE

## 2019-02-04 PROCEDURE — 3008F BODY MASS INDEX DOCD: CPT | Mod: CPTII,S$GLB,, | Performed by: FAMILY MEDICINE

## 2019-02-04 PROCEDURE — 99999 PR PBB SHADOW E&M-EST. PATIENT-LVL III: ICD-10-PCS | Mod: PBBFAC,HCNC,, | Performed by: FAMILY MEDICINE

## 2019-02-04 PROCEDURE — 99213 OFFICE O/P EST LOW 20 MIN: CPT | Mod: S$GLB,,, | Performed by: FAMILY MEDICINE

## 2019-02-04 RX ORDER — PREGABALIN 50 MG/1
50 CAPSULE ORAL 2 TIMES DAILY
Qty: 60 CAPSULE | Refills: 1 | Status: SHIPPED | OUTPATIENT
Start: 2019-02-04 | End: 2019-04-09 | Stop reason: SDUPTHER

## 2019-02-04 NOTE — PROGRESS NOTES
Subjective:       Patient ID: Nadia Damon is a 63 y.o. female.    Chief Complaint: Follow-up (blood pressure)    She continues off apresoline.  Her home blood pressures have been 100-115 systolic.  She feels much improved.  She denies headache chest pain palpitations shortness of breath weakness or edema. She also has been using Ambien and Restoril for number of years.  She feels had no longer working and would like to wean off her medications.      Review of Systems   Constitutional: Negative for appetite change, fatigue and unexpected weight change.   Respiratory: Negative for cough, shortness of breath and wheezing.    Cardiovascular: Negative for chest pain, palpitations and leg swelling.   Gastrointestinal: Negative for abdominal pain.   Genitourinary: Negative for difficulty urinating.   Neurological: Negative for headaches.   Psychiatric/Behavioral: Positive for sleep disturbance.       Objective:      Physical Exam   Constitutional: She is oriented to person, place, and time. She appears well-developed and well-nourished. No distress.   Neck: Neck supple. No thyromegaly present.   Cardiovascular: Normal rate, regular rhythm and normal heart sounds.   No murmur heard.  Pulmonary/Chest: Effort normal and breath sounds normal. No respiratory distress. She has no wheezes.   Lymphadenopathy:     She has no cervical adenopathy.   Neurological: She is alert and oriented to person, place, and time.       Office Visit on 01/16/2019   Component Date Value Ref Range Status    WBC 01/16/2019 5.12  3.90 - 12.70 K/uL Final    RBC 01/16/2019 3.74* 4.00 - 5.40 M/uL Final    Hemoglobin 01/16/2019 10.6* 12.0 - 16.0 g/dL Final    Hematocrit 01/16/2019 33.2* 37.0 - 48.5 % Final    MCV 01/16/2019 89  82 - 98 fL Final    MCH 01/16/2019 28.3  27.0 - 31.0 pg Final    MCHC 01/16/2019 31.9* 32.0 - 36.0 g/dL Final    RDW 01/16/2019 14.4  11.5 - 14.5 % Final    Platelets 01/16/2019 267  150 - 350 K/uL Final    MPV  01/16/2019 10.4  9.2 - 12.9 fL Final    Immature Granulocytes 01/16/2019 0.2  0.0 - 0.5 % Final    Gran # (ANC) 01/16/2019 3.4  1.8 - 7.7 K/uL Final    Immature Grans (Abs) 01/16/2019 0.01  0.00 - 0.04 K/uL Final    Lymph # 01/16/2019 1.2  1.0 - 4.8 K/uL Final    Mono # 01/16/2019 0.5  0.3 - 1.0 K/uL Final    Eos # 01/16/2019 0.1  0.0 - 0.5 K/uL Final    Baso # 01/16/2019 0.01  0.00 - 0.20 K/uL Final    nRBC 01/16/2019 0  0 /100 WBC Final    Gran% 01/16/2019 65.7  38.0 - 73.0 % Final    Lymph% 01/16/2019 22.5  18.0 - 48.0 % Final    Mono% 01/16/2019 9.4  4.0 - 15.0 % Final    Eosinophil% 01/16/2019 2.0  0.0 - 8.0 % Final    Basophil% 01/16/2019 0.2  0.0 - 1.9 % Final    Differential Method 01/16/2019 Automated   Final    Sodium 01/16/2019 139  136 - 145 mmol/L Final    Potassium 01/16/2019 5.3* 3.5 - 5.1 mmol/L Final    Chloride 01/16/2019 105  95 - 110 mmol/L Final    CO2 01/16/2019 26  23 - 29 mmol/L Final    Glucose 01/16/2019 123* 70 - 110 mg/dL Final    BUN, Bld 01/16/2019 38* 8 - 23 mg/dL Final    Creatinine 01/16/2019 1.8* 0.5 - 1.4 mg/dL Final    Calcium 01/16/2019 9.3  8.7 - 10.5 mg/dL Final    Total Protein 01/16/2019 7.8  6.0 - 8.4 g/dL Final    Albumin 01/16/2019 3.1* 3.5 - 5.2 g/dL Final    Total Bilirubin 01/16/2019 0.5  0.1 - 1.0 mg/dL Final    Alkaline Phosphatase 01/16/2019 116  55 - 135 U/L Final    AST 01/16/2019 34  10 - 40 U/L Final    ALT 01/16/2019 24  10 - 44 U/L Final    Anion Gap 01/16/2019 8  8 - 16 mmol/L Final    eGFR if  01/16/2019 33.8* >60 mL/min/1.73 m^2 Final    eGFR if non African American 01/16/2019 29.3* >60 mL/min/1.73 m^2 Final     Assessment:       1. Fibromyalgia        Plan:   Continue home blood pressure monitor.  She needs 1 month refill locally of Lyrica.  Recommend continuing Restoril 30 mg at bedtime and decrease Ambien 10 mg and start taking Ambien 5 mg at bedtime.  After 2 weeks discontinue Ambien.  Follow-up in 1  month.       Fibromyalgia  -     pregabalin (LYRICA) 50 MG capsule; Take 1 capsule (50 mg total) by mouth 2 (two) times daily.  Dispense: 60 capsule; Refill: 1

## 2019-02-13 ENCOUNTER — LAB VISIT (OUTPATIENT)
Dept: LAB | Facility: HOSPITAL | Age: 64
End: 2019-02-13
Attending: INTERNAL MEDICINE
Payer: MEDICARE

## 2019-02-13 DIAGNOSIS — Z79.52 LONG TERM CURRENT USE OF SYSTEMIC STEROIDS: ICD-10-CM

## 2019-02-13 DIAGNOSIS — Z94.1 STATUS POST HEART TRANSPLANT: ICD-10-CM

## 2019-02-13 DIAGNOSIS — E78.5 HYPERLIPIDEMIA, UNSPECIFIED HYPERLIPIDEMIA TYPE: ICD-10-CM

## 2019-02-13 DIAGNOSIS — N20.0 NEPHROLITHIASIS: ICD-10-CM

## 2019-02-13 DIAGNOSIS — Z79.899 ENCOUNTER FOR LONG-TERM (CURRENT) USE OF MEDICATIONS: ICD-10-CM

## 2019-02-13 DIAGNOSIS — T86.20 COMPLICATION OF HEART TRANSPLANT, UNSPECIFIED COMPLICATION: ICD-10-CM

## 2019-02-13 LAB
ALBUMIN SERPL BCP-MCNC: 3.4 G/DL
ALP SERPL-CCNC: 120 U/L
ALT SERPL W/O P-5'-P-CCNC: 28 U/L
ANION GAP SERPL CALC-SCNC: 12 MMOL/L
ANION GAP SERPL CALC-SCNC: 12 MMOL/L
AST SERPL-CCNC: 37 U/L
BASOPHILS # BLD AUTO: 0.02 K/UL
BASOPHILS NFR BLD: 0.7 %
BILIRUB SERPL-MCNC: 0.6 MG/DL
BUN SERPL-MCNC: 24 MG/DL
BUN SERPL-MCNC: 24 MG/DL
CALCIUM SERPL-MCNC: 9.4 MG/DL
CALCIUM SERPL-MCNC: 9.4 MG/DL
CHLORIDE SERPL-SCNC: 106 MMOL/L
CHLORIDE SERPL-SCNC: 106 MMOL/L
CHOLEST SERPL-MCNC: 190 MG/DL
CHOLEST/HDLC SERPL: 3.3 {RATIO}
CO2 SERPL-SCNC: 27 MMOL/L
CO2 SERPL-SCNC: 27 MMOL/L
CREAT SERPL-MCNC: 1.3 MG/DL
CREAT SERPL-MCNC: 1.3 MG/DL
DIFFERENTIAL METHOD: ABNORMAL
EOSINOPHIL # BLD AUTO: 0.1 K/UL
EOSINOPHIL NFR BLD: 3.9 %
ERYTHROCYTE [DISTWIDTH] IN BLOOD BY AUTOMATED COUNT: 14.7 %
EST. GFR  (AFRICAN AMERICAN): 50.1 ML/MIN/1.73 M^2
EST. GFR  (AFRICAN AMERICAN): 50.1 ML/MIN/1.73 M^2
EST. GFR  (NON AFRICAN AMERICAN): 43.5 ML/MIN/1.73 M^2
EST. GFR  (NON AFRICAN AMERICAN): 43.5 ML/MIN/1.73 M^2
GLUCOSE SERPL-MCNC: 87 MG/DL
GLUCOSE SERPL-MCNC: 87 MG/DL
HCT VFR BLD AUTO: 34.6 %
HDLC SERPL-MCNC: 57 MG/DL
HDLC SERPL: 30 %
HGB BLD-MCNC: 10.8 G/DL
IMM GRANULOCYTES # BLD AUTO: 0.01 K/UL
IMM GRANULOCYTES NFR BLD AUTO: 0.4 %
LDLC SERPL CALC-MCNC: 101.6 MG/DL
LYMPHOCYTES # BLD AUTO: 1.2 K/UL
LYMPHOCYTES NFR BLD: 44 %
MAGNESIUM SERPL-MCNC: 1.8 MG/DL
MCH RBC QN AUTO: 27.6 PG
MCHC RBC AUTO-ENTMCNC: 31.2 G/DL
MCV RBC AUTO: 88 FL
MONOCYTES # BLD AUTO: 0.4 K/UL
MONOCYTES NFR BLD: 13.5 %
NEUTROPHILS # BLD AUTO: 1.1 K/UL
NEUTROPHILS NFR BLD: 37.5 %
NONHDLC SERPL-MCNC: 133 MG/DL
NRBC BLD-RTO: 0 /100 WBC
PLATELET # BLD AUTO: 263 K/UL
PMV BLD AUTO: 9.4 FL
POTASSIUM SERPL-SCNC: 4.1 MMOL/L
POTASSIUM SERPL-SCNC: 4.1 MMOL/L
PROT SERPL-MCNC: 7.9 G/DL
RBC # BLD AUTO: 3.92 M/UL
SODIUM SERPL-SCNC: 145 MMOL/L
SODIUM SERPL-SCNC: 145 MMOL/L
TRIGL SERPL-MCNC: 157 MG/DL
URATE SERPL-MCNC: 6.7 MG/DL
WBC # BLD AUTO: 2.82 K/UL

## 2019-02-13 PROCEDURE — 85025 COMPLETE CBC W/AUTO DIFF WBC: CPT | Mod: HCNC

## 2019-02-13 PROCEDURE — 80158 DRUG ASSAY CYCLOSPORINE: CPT | Mod: HCNC

## 2019-02-13 PROCEDURE — 86832 HLA CLASS I HIGH DEFIN QUAL: CPT | Mod: HCNC

## 2019-02-13 PROCEDURE — 86833 HLA CLASS II HIGH DEFIN QUAL: CPT | Mod: 59,HCNC

## 2019-02-13 PROCEDURE — 36415 COLL VENOUS BLD VENIPUNCTURE: CPT | Mod: HCNC

## 2019-02-13 PROCEDURE — 84550 ASSAY OF BLOOD/URIC ACID: CPT | Mod: HCNC

## 2019-02-13 PROCEDURE — 80061 LIPID PANEL: CPT | Mod: HCNC

## 2019-02-13 PROCEDURE — 86833 HLA CLASS II HIGH DEFIN QUAL: CPT | Mod: HCNC

## 2019-02-13 PROCEDURE — 83735 ASSAY OF MAGNESIUM: CPT | Mod: HCNC

## 2019-02-13 PROCEDURE — 86977 RBC SERUM PRETX INCUBJ/INHIB: CPT | Mod: HCNC

## 2019-02-13 PROCEDURE — 86832 HLA CLASS I HIGH DEFIN QUAL: CPT | Mod: 91,HCNC

## 2019-02-13 PROCEDURE — 80053 COMPREHEN METABOLIC PANEL: CPT | Mod: HCNC

## 2019-02-13 RX ORDER — OMEPRAZOLE 10 MG/1
CAPSULE, DELAYED RELEASE ORAL
Qty: 90 CAPSULE | Refills: 3 | Status: SHIPPED | OUTPATIENT
Start: 2019-02-13 | End: 2019-11-18 | Stop reason: SDUPTHER

## 2019-02-14 LAB — CYCLOSPORINE BLD LC/MS/MS-MCNC: 116 NG/ML

## 2019-02-15 ENCOUNTER — OFFICE VISIT (OUTPATIENT)
Dept: NEPHROLOGY | Facility: CLINIC | Age: 64
End: 2019-02-15
Payer: MEDICARE

## 2019-02-15 ENCOUNTER — TELEPHONE (OUTPATIENT)
Dept: TRANSPLANT | Facility: CLINIC | Age: 64
End: 2019-02-15

## 2019-02-15 VITALS
SYSTOLIC BLOOD PRESSURE: 118 MMHG | RESPIRATION RATE: 18 BRPM | HEART RATE: 86 BPM | DIASTOLIC BLOOD PRESSURE: 70 MMHG | WEIGHT: 144.38 LBS | OXYGEN SATURATION: 96 % | BODY MASS INDEX: 26.41 KG/M2

## 2019-02-15 DIAGNOSIS — N18.30 CHRONIC KIDNEY DISEASE, STAGE III (MODERATE): ICD-10-CM

## 2019-02-15 DIAGNOSIS — I10 ESSENTIAL HYPERTENSION: ICD-10-CM

## 2019-02-15 DIAGNOSIS — Z71.89 ENCOUNTER FOR MEDICATION REVIEW AND COUNSELING: ICD-10-CM

## 2019-02-15 DIAGNOSIS — E79.0 HYPERURICEMIA: ICD-10-CM

## 2019-02-15 DIAGNOSIS — N20.0 NEPHROLITHIASIS: Primary | ICD-10-CM

## 2019-02-15 LAB
CLASS I ANTIBODY COMMENTS - LUMINEX: NORMAL
CLASS II ANTIBODY COMMENTS - LUMINEX: NORMAL
DSA1 TESTING DATE: NORMAL
DSA12 TESTING DATE: NORMAL
DSA2 TESTING DATE: NORMAL
SERUM COLLECTION DT - LUMINEX CLASS I: NORMAL
SERUM COLLECTION DT - LUMINEX CLASS II: NORMAL

## 2019-02-15 PROCEDURE — 3074F SYST BP LT 130 MM HG: CPT | Mod: HCNC,CPTII,S$GLB, | Performed by: INTERNAL MEDICINE

## 2019-02-15 PROCEDURE — 99999 PR PBB SHADOW E&M-EST. PATIENT-LVL IV: ICD-10-PCS | Mod: PBBFAC,HCNC,, | Performed by: INTERNAL MEDICINE

## 2019-02-15 PROCEDURE — 3008F PR BODY MASS INDEX (BMI) DOCUMENTED: ICD-10-PCS | Mod: HCNC,CPTII,S$GLB, | Performed by: INTERNAL MEDICINE

## 2019-02-15 PROCEDURE — 99214 OFFICE O/P EST MOD 30 MIN: CPT | Mod: HCNC,S$GLB,, | Performed by: INTERNAL MEDICINE

## 2019-02-15 PROCEDURE — 3074F PR MOST RECENT SYSTOLIC BLOOD PRESSURE < 130 MM HG: ICD-10-PCS | Mod: HCNC,CPTII,S$GLB, | Performed by: INTERNAL MEDICINE

## 2019-02-15 PROCEDURE — 3008F BODY MASS INDEX DOCD: CPT | Mod: HCNC,CPTII,S$GLB, | Performed by: INTERNAL MEDICINE

## 2019-02-15 PROCEDURE — 3078F PR MOST RECENT DIASTOLIC BLOOD PRESSURE < 80 MM HG: ICD-10-PCS | Mod: HCNC,CPTII,S$GLB, | Performed by: INTERNAL MEDICINE

## 2019-02-15 PROCEDURE — 3078F DIAST BP <80 MM HG: CPT | Mod: HCNC,CPTII,S$GLB, | Performed by: INTERNAL MEDICINE

## 2019-02-15 PROCEDURE — 99214 PR OFFICE/OUTPT VISIT, EST, LEVL IV, 30-39 MIN: ICD-10-PCS | Mod: HCNC,S$GLB,, | Performed by: INTERNAL MEDICINE

## 2019-02-15 PROCEDURE — 99999 PR PBB SHADOW E&M-EST. PATIENT-LVL IV: CPT | Mod: PBBFAC,HCNC,, | Performed by: INTERNAL MEDICINE

## 2019-02-15 NOTE — PROGRESS NOTES
NEPHROLOGY CLINIC FOLLOWUP NOTE  Date of clinic visit: 2/15/19     REASON FOR FOLLOWUP AND CHIEF COMPLAINT: nephrolithiasis and history of chronic kidney disease post-heart transplant.     HISTORY OF PRESENT ILLNESS: Ms. Damon is a 63-year-old  female with a history of CKD stage 3 and heart transplant in 1993, who presents for f/u. Pt was last seen 4 months ago. Pt reports no new stones. No discomfort today, no back pain, no fever. Pt does report that she sometimes has a vague feeling of discomfort in right flank. Pt has followed medical advice regarding avoiding foods that are risk factor for kidney stones. Noted Bp was low and PCP stopped both hydralazine and HCTZ.    To review:   Pt previously developed right sided back pain, thought to be related to kidney stones identified on renal u/s. The stones in the R kidney were non-obstructive but were relatively large at 10 and 12 mm each. Based on h/o of taking cyclosporine to prevent heart transplant rejection and eating a lot of sea food (shrimp and crawfish), uric acid stones were suspected. Pt had additional risk factors for non-uric acid stones, including taking Vit C 1 g/day and Vit D. After dietary modification, the flank pain resolved. A f/u CT scan did not show any stones. It is possible that she passed the 2 stones, as uric acid stones are soluble on urinary alkalinization and can pass by themselves. Pt was advised to stop Vit C and Vit D, which she has. She was also advised on drinking freshly squeezed lemon juice and follow dietary recommendations to avoid nephrolithiasis risk factors. Pt was referred also to urology. A repeat CT scan did not show any stones. Pt obtained a 24 hour urine collection for kidney stone risk stratification which showed mild hypocitraturia.         To review:  Pt has CKD       PAST MEDICAL HISTORY:  1. CKD stage III. 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 Hypertension.  3. Heart transplant for cardiomyopathy in 1993, the patient has been on chronic  cyclosporine treatment.  4. Carpal tunnel syndrome.  5. Fibromyalgia.  6. GERD.  7. Bell's palsy.  8. Depression.     PAST SURGICAL HISTORY: Reviewed as above, unchanged.     MEDICATIONS: Reviewed, as per previous note, unchanged.     SOCIAL HISTORY: Reviewed. Negative for smoking. No alcohol use.     MEDICATIONS: Reviewed. Amlodipine 5 mg po qd, Toprol-XL 25 mg p.o. daily. Other medications were   carefully reviewed and noted and discussed with the patient.     REVIEW OF SYSTEMS: No recent hospitalizations.  GENERAL: Negative.  HEAD, EYES, EARS, NOSE, AND THROAT: Negative.  CARDIAC: Negative.  PULMONARY: Negative.  GASTROINTESTINAL: Negative.  GENITOURINARY: Negative.  PSYCHOLOGICAL: Negative.  NEUROLOGICAL: Negative.  ENDOCRINE: Negative.  HEMATOLOGIC AND ONCOLOGIC: Negative.  INFECTIOUS DISEASE: Negative.  The rest of the review of systems negative.     PHYSICAL EXAMINATION:  VITAL SIGNS: Repeat blood pressure is 118/74. Pulse is 86, weight is 64.5 Kg  GENERAL: She is cooperative, pleasant, in no acute distress.   Speech and thought process appropriate, normal.  HEENT: Mucous membranes moist.  NECK: Neck JVD. Normal hearing.  ABDOMEN: Soft, nontender.  Side and back: mid back, non tender, no sign of trauma  EXTREMITIES: no edema.     LABS: Reviewed. No recent labs.  BMP  Lab Results   Component Value Date     02/13/2019     02/13/2019    K 4.1 02/13/2019    K 4.1 02/13/2019     02/13/2019     02/13/2019    CO2 27 02/13/2019    CO2 27 02/13/2019    BUN 24 (H) 02/13/2019    BUN 24 (H) 02/13/2019    CREATININE 1.3 02/13/2019    CREATININE 1.3 02/13/2019    CALCIUM 9.4 02/13/2019    CALCIUM 9.4 02/13/2019    ANIONGAP 12 02/13/2019    ANIONGAP 12 02/13/2019    ESTGFRAFRICA 50.1 (A) 02/13/2019    ESTGFRAFRICA 50.1 (A) 02/13/2019    EGFRNONAA 43.5 (A) 02/13/2019    EGFRNONAA 43.5 (A)  02/13/2019     Lab Results   Component Value Date    URICACID 6.7 (H) 02/13/2019       To review:   24 hour urine: Ca not measured, uric acid 138, Oxalate 20, citrate 203  U/a unremarkable  Urine Cx: neg   Urine P/Cr 740 mg     KUB: unremarkable, except for some constipation     Renal u/s: FINDINGS:  Right kidney: The right kidney measures 9.6 cm. Mild cortical thinning and increased cortical echogenicity.  10 and 12 mm stones are seen in the upper and lower poles respectively.  No cortical thinning. No loss of corticomedullary distinction. No mass. No renal stone. No hydronephrosis.  Left kidney: The left kidney measures 9.3 cm. Mild cortical thinning and increased cortical echogenicity. No loss of corticomedullary distinction. No mass. No renal stone. No hydronephrosis.  The bladder is partially distended at the time of scanning and has an unremarkable appearance.     CT abd: The left kidney appears slightly small.  Right kidney is grossly normal in size.  No obvious hydronephrosis or nephrolithiasis        ASSESSMENT AND PLAN: This is a 63-year-old female who presents for follow up of nephrolithiasis and CKD   Patient has a h/o of heart transplant  The impression is as follows:     1. Nephrolithiasis: no new stones  Mild hyperuricemia noted  Right sided mid back pain: previously had resolved. May have now returned  Pt was advised that she may be forming new stones  Advised to continue prior, previously recommended treatment and prevention measures  Pt was reminded of what had been recommended previously to reduce kidney stone formation  Pt may have passed the stones  F/u CT did not show any stones  Pt saw Urology  U/s earlier showed non-obstructive 2 R stones, relatively large     Pt had multiple risk factors for both uric acid and Ca Oxalate stones  24 hour urine for kidney stone stratification shows hypocitraturia  Pt was advised NOT TO STOP cyclosporine.     Pt was again advised of dietary risk factors for  kidney stones, was given printed literature  Advised pt to:  -keep water intake to >2 L/day  -keep salt in diet to <3 g/day  -reduce meat intake, specially seafood  -keep Ca intake average  -NoVit C  -No Vit D     2. Renal. 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 HTN: BP controlled  No low  Reviewed meds with pt  Noted hydralazine and HCTZ were stopped by PCP  Please note that HCTZ can be used for stone prevention (is hypocalciuric)     4. History of heart transplant on cyclosporine.  Per heart transplant team.     5. Proteinuria. Mild. Less than 1 g per day  Repeat is lower 740 mg.  Would benefit from an ACE inhibitor or angiotensin receptor blocker.   Will look at K on pending labs     6. Right mid back pain: not related urologically  Likely musculoskeletal        PLANS AND RECOMMENDATIONS:   As discussed above  RTC 4 months  Multiple issues addressed  Total time spent 25 minutes. Extensive time spent including the time to review the records, the patient evaluation, documentation, face-to-face  discussion with the patient. More than 50% of the time was spent in counseling  and coordination of care. Level IV visit.     Carter Crawford MD

## 2019-02-15 NOTE — TELEPHONE ENCOUNTER
Called to check up on the Nadia, HLA lab is pending from 2/14/19. Pt had food surgery in Nov 2018.    Pt called back and stated that she is feeling better and is seeing her Nephrologist today, I explained I will call back next week once I get the HLA Tests back.  We also spoke about her annual in April/May.

## 2019-02-17 DIAGNOSIS — F51.01 PRIMARY INSOMNIA: ICD-10-CM

## 2019-02-18 ENCOUNTER — PATIENT OUTREACH (OUTPATIENT)
Dept: ADMINISTRATIVE | Facility: HOSPITAL | Age: 64
End: 2019-02-18

## 2019-02-18 RX ORDER — TEMAZEPAM 30 MG/1
CAPSULE ORAL
Qty: 90 CAPSULE | Refills: 0 | Status: SHIPPED | OUTPATIENT
Start: 2019-02-18 | End: 2019-05-20 | Stop reason: SDUPTHER

## 2019-02-24 LAB
C1Q1 TESTING DATE: NORMAL
C1Q2 TESTING DATE: NORMAL
CLASS I ANTIBODY COMMENTS - LUMINEX: NORMAL
CLASS II ANTIBODY COMMENTS - LUMINEX: NORMAL
HC1Q TESTING DATE: NORMAL
SERUM COLLECTION DT - LUMINEX CLASS I: NORMAL
SERUM COLLECTION DT - LUMINEX CLASS II: NORMAL

## 2019-03-18 ENCOUNTER — OFFICE VISIT (OUTPATIENT)
Dept: INTERNAL MEDICINE | Facility: CLINIC | Age: 64
End: 2019-03-18
Payer: MEDICARE

## 2019-03-18 VITALS
TEMPERATURE: 99 F | HEART RATE: 93 BPM | DIASTOLIC BLOOD PRESSURE: 84 MMHG | WEIGHT: 145.06 LBS | HEIGHT: 62 IN | SYSTOLIC BLOOD PRESSURE: 122 MMHG | BODY MASS INDEX: 26.69 KG/M2 | OXYGEN SATURATION: 100 %

## 2019-03-18 DIAGNOSIS — I10 ESSENTIAL HYPERTENSION: ICD-10-CM

## 2019-03-18 DIAGNOSIS — G47.00 INSOMNIA, UNSPECIFIED TYPE: ICD-10-CM

## 2019-03-18 DIAGNOSIS — I95.9 HYPOTENSION, UNSPECIFIED HYPOTENSION TYPE: Primary | ICD-10-CM

## 2019-03-18 PROCEDURE — 99999 PR PBB SHADOW E&M-EST. PATIENT-LVL V: ICD-10-PCS | Mod: PBBFAC,HCNC,, | Performed by: FAMILY MEDICINE

## 2019-03-18 PROCEDURE — 99213 PR OFFICE/OUTPT VISIT, EST, LEVL III, 20-29 MIN: ICD-10-PCS | Mod: HCNC,S$GLB,, | Performed by: FAMILY MEDICINE

## 2019-03-18 PROCEDURE — 3079F DIAST BP 80-89 MM HG: CPT | Mod: HCNC,CPTII,S$GLB, | Performed by: FAMILY MEDICINE

## 2019-03-18 PROCEDURE — 99999 PR PBB SHADOW E&M-EST. PATIENT-LVL V: CPT | Mod: PBBFAC,HCNC,, | Performed by: FAMILY MEDICINE

## 2019-03-18 PROCEDURE — 3079F PR MOST RECENT DIASTOLIC BLOOD PRESSURE 80-89 MM HG: ICD-10-PCS | Mod: HCNC,CPTII,S$GLB, | Performed by: FAMILY MEDICINE

## 2019-03-18 PROCEDURE — 3074F PR MOST RECENT SYSTOLIC BLOOD PRESSURE < 130 MM HG: ICD-10-PCS | Mod: HCNC,CPTII,S$GLB, | Performed by: FAMILY MEDICINE

## 2019-03-18 PROCEDURE — 3074F SYST BP LT 130 MM HG: CPT | Mod: HCNC,CPTII,S$GLB, | Performed by: FAMILY MEDICINE

## 2019-03-18 PROCEDURE — 3008F PR BODY MASS INDEX (BMI) DOCUMENTED: ICD-10-PCS | Mod: HCNC,CPTII,S$GLB, | Performed by: FAMILY MEDICINE

## 2019-03-18 PROCEDURE — 3008F BODY MASS INDEX DOCD: CPT | Mod: HCNC,CPTII,S$GLB, | Performed by: FAMILY MEDICINE

## 2019-03-18 PROCEDURE — 99213 OFFICE O/P EST LOW 20 MIN: CPT | Mod: HCNC,S$GLB,, | Performed by: FAMILY MEDICINE

## 2019-03-18 NOTE — PROGRESS NOTES
Subjective:       Patient ID: Nadia Damon is a 63 y.o. female.    Chief Complaint: Follow-up    Follow-up hypotension insomnia.  She reports she feels much better since medications have been adjusted.  She is no longer lightheaded.  She also reports that she  stopped using caffeine in the evening Restoril and Ambien combination are working very well for insomnia      Review of Systems   Constitutional: Negative for fatigue.   Respiratory: Negative for cough and shortness of breath.    Cardiovascular: Negative for chest pain, palpitations and leg swelling.   Neurological: Negative for dizziness, weakness, light-headedness and headaches.   Psychiatric/Behavioral: Positive for sleep disturbance.       Objective:      Physical Exam   Constitutional: She appears well-developed and well-nourished. No distress.   Cardiovascular: Normal rate and regular rhythm.   Pulmonary/Chest: Effort normal and breath sounds normal.   Psychiatric: She has a normal mood and affect. Her behavior is normal. Judgment and thought content normal.       Lab Visit on 02/13/2019   Component Date Value Ref Range Status    WBC 02/13/2019 2.82* 3.90 - 12.70 K/uL Final    RBC 02/13/2019 3.92* 4.00 - 5.40 M/uL Final    Hemoglobin 02/13/2019 10.8* 12.0 - 16.0 g/dL Final    Hematocrit 02/13/2019 34.6* 37.0 - 48.5 % Final    MCV 02/13/2019 88  82 - 98 fL Final    MCH 02/13/2019 27.6  27.0 - 31.0 pg Final    MCHC 02/13/2019 31.2* 32.0 - 36.0 g/dL Final    RDW 02/13/2019 14.7* 11.5 - 14.5 % Final    Platelets 02/13/2019 263  150 - 350 K/uL Final    MPV 02/13/2019 9.4  9.2 - 12.9 fL Final    Immature Granulocytes 02/13/2019 0.4  0.0 - 0.5 % Final    Gran # (ANC) 02/13/2019 1.1* 1.8 - 7.7 K/uL Final    Immature Grans (Abs) 02/13/2019 0.01  0.00 - 0.04 K/uL Final    Lymph # 02/13/2019 1.2  1.0 - 4.8 K/uL Final    Mono # 02/13/2019 0.4  0.3 - 1.0 K/uL Final    Eos # 02/13/2019 0.1  0.0 - 0.5 K/uL Final    Baso # 02/13/2019 0.02  0.00 -  0.20 K/uL Final    nRBC 02/13/2019 0  0 /100 WBC Final    Gran% 02/13/2019 37.5* 38.0 - 73.0 % Final    Lymph% 02/13/2019 44.0  18.0 - 48.0 % Final    Mono% 02/13/2019 13.5  4.0 - 15.0 % Final    Eosinophil% 02/13/2019 3.9  0.0 - 8.0 % Final    Basophil% 02/13/2019 0.7  0.0 - 1.9 % Final    Differential Method 02/13/2019 Automated   Final    Sodium 02/13/2019 145  136 - 145 mmol/L Final    Potassium 02/13/2019 4.1  3.5 - 5.1 mmol/L Final    Chloride 02/13/2019 106  95 - 110 mmol/L Final    CO2 02/13/2019 27  23 - 29 mmol/L Final    Glucose 02/13/2019 87  70 - 110 mg/dL Final    BUN, Bld 02/13/2019 24* 8 - 23 mg/dL Final    Creatinine 02/13/2019 1.3  0.5 - 1.4 mg/dL Final    Calcium 02/13/2019 9.4  8.7 - 10.5 mg/dL Final    Total Protein 02/13/2019 7.9  6.0 - 8.4 g/dL Final    Albumin 02/13/2019 3.4* 3.5 - 5.2 g/dL Final    Total Bilirubin 02/13/2019 0.6  0.1 - 1.0 mg/dL Final    Alkaline Phosphatase 02/13/2019 120  55 - 135 U/L Final    AST 02/13/2019 37  10 - 40 U/L Final    ALT 02/13/2019 28  10 - 44 U/L Final    Anion Gap 02/13/2019 12  8 - 16 mmol/L Final    eGFR if  02/13/2019 50.1* >60 mL/min/1.73 m^2 Final    eGFR if non African American 02/13/2019 43.5* >60 mL/min/1.73 m^2 Final    HC1Q Testing Date 02/13/2019 02/22/2019 12:00 AM   Final    C1Q1 Testing Date 02/13/2019 02/22/2019 12:00 AM   Final    Serum Collection DT - [facility]* 02/13/2019 02/13/2019 10:21 AM   Final    Class I Antibody Comments - [facility] 02/13/2019 NO DSA DETECTED   Final    C1Q2 Testing Date 02/13/2019 02/22/2019 12:00 AM   Final    Serum Collection DT - [facility]* 02/13/2019 02/13/2019 10:21 AM   Final    Class II Antibody Comments - [facility]* 02/13/2019 NO DSA DETECTED   Final    DSA12 Testing Date 02/13/2019 02/14/2019 12:00 AM   Final    DSA1 Testing Date 02/13/2019 02/14/2019 12:00 AM   Final    Serum Collection DT - [facility] Cl[facility]* 02/13/2019 02/13/2019 10:21 AM   Final    Class  I Antibody Comments - [facility] 02/13/2019 NO DSA DETECTED   Final    DSA2 Testing Date 02/13/2019 02/14/2019 12:00 AM   Final    Serum Collection DT - [facility]* 02/13/2019 02/13/2019 10:21 AM   Final    Class II Antibody Comments - [facility]* 02/13/2019 WEAK DSA DETECTED--- [id number]),   Final    Cholesterol 02/13/2019 190  120 - 199 mg/dL Final    Triglycerides 02/13/2019 157* 30 - 150 mg/dL Final    HDL 02/13/2019 57  40 - 75 mg/dL Final    LDL Cholesterol 02/13/2019 101.6  63.0 - 159.0 mg/dL Final    HDL/Chol Ratio 02/13/2019 30.0  20.0 - 50.0 % Final    Total Cholesterol/HDL Ratio 02/13/2019 3.3  2.0 - 5.0 Final    Non-HDL Cholesterol 02/13/2019 133  mg/dL Final    Magnesium 02/13/2019 1.8  1.6 - 2.6 mg/dL Final    Cyclosporine, LC/MS 02/13/2019 116  100 - 400 ng/mL Final    Sodium 02/13/2019 145  136 - 145 mmol/L Final    Potassium 02/13/2019 4.1  3.5 - 5.1 mmol/L Final    Chloride 02/13/2019 106  95 - 110 mmol/L Final    CO2 02/13/2019 27  23 - 29 mmol/L Final    Glucose 02/13/2019 87  70 - 110 mg/dL Final    BUN, Bld 02/13/2019 24* 8 - 23 mg/dL Final    Creatinine 02/13/2019 1.3  0.5 - 1.4 mg/dL Final    Calcium 02/13/2019 9.4  8.7 - 10.5 mg/dL Final    Anion Gap 02/13/2019 12  8 - 16 mmol/L Final    eGFR if  02/13/2019 50.1* >60 mL/min/1.73 m^2 Final    eGFR if non African American 02/13/2019 43.5* >60 mL/min/1.73 m^2 Final    Uric Acid 02/13/2019 6.7* 2.4 - 5.7 mg/dL Final     Assessment:       No diagnosis found.    Plan:     Blood pressure 122/84.  Continue current medications follow-up in 3 months    There are no diagnoses linked to this encounter.

## 2019-03-19 ENCOUNTER — TELEPHONE (OUTPATIENT)
Dept: TRANSPLANT | Facility: CLINIC | Age: 64
End: 2019-03-19

## 2019-03-19 DIAGNOSIS — Z79.899 ENCOUNTER FOR LONG-TERM (CURRENT) USE OF MEDICATIONS: ICD-10-CM

## 2019-03-19 DIAGNOSIS — I10 ESSENTIAL HYPERTENSION: ICD-10-CM

## 2019-03-19 DIAGNOSIS — T86.20 COMPLICATION OF HEART TRANSPLANT, UNSPECIFIED COMPLICATION: ICD-10-CM

## 2019-03-19 DIAGNOSIS — Z94.1 STATUS POST HEART TRANSPLANT: ICD-10-CM

## 2019-03-19 DIAGNOSIS — E78.2 MIXED HYPERLIPIDEMIA: ICD-10-CM

## 2019-03-21 DIAGNOSIS — F51.01 PRIMARY INSOMNIA: ICD-10-CM

## 2019-03-21 RX ORDER — ZOLPIDEM TARTRATE 10 MG/1
TABLET ORAL
Qty: 30 TABLET | Refills: 2 | Status: SHIPPED | OUTPATIENT
Start: 2019-03-21 | End: 2019-09-07 | Stop reason: SDUPTHER

## 2019-03-21 NOTE — TELEPHONE ENCOUNTER
----- Message from Vanna Mazomanie sent at 3/21/2019 11:24 AM CDT -----  Contact: pt  Type:  RX Refill Request    Who Called: pt  Refill or New Rx: refill   RX Name and Strength: Zolpidem 10 mg  How is the patient currently taking it? (ex. 1XDay): 1 a day   Is this a 30 day or 90 day RX:90  Preferred Pharmacy with phone number: listed below  Local or Mail Order: local  Ordering Provider: Dr Cassidy  Would the patient rather a call back or a response via [facility]? Call back   Best Call Back Number: [phone]  Additional Information:         [id number] - [place] - [address] [facility] AT [address]  [place] [address]  Phone: [phone] Fax: [phone]

## 2019-03-21 NOTE — TELEPHONE ENCOUNTER
Pt requesting a 90 day supply of Ambien. Will call in a 90 day supply with 0 refill. Original Rx is written for 30 day supply with 2 refills.

## 2019-04-09 DIAGNOSIS — M79.7 FIBROMYALGIA: ICD-10-CM

## 2019-04-09 NOTE — TELEPHONE ENCOUNTER
Patient called in regards to needing a refill on her Rx (Lyrica). Patient states that she is completely out of this medication. Patient last seen on 3/18/19.    Please Advise

## 2019-04-09 NOTE — TELEPHONE ENCOUNTER
----- Message from Yolette Das sent at 4/9/2019  2:05 PM CDT -----  Contact: Patient  Type:  RX Refill Request    Who Called:  Nadia  Refill or New Rx: Refill  RX Name and Strength: Lyrica 50 mg   How is the patient currently taking it? (ex. 1XDay): 2xdaily  Is this a 30 day or 90 day RX: 30  Preferred Pharmacy with phone number:     [id number] - [place] - [address] [facility] AT [address] [place] [address]  Phone: [phone] Fax: [phone]    Local or Mail Order: local  Ordering Provider: Richie Cassidy  Would the patient rather a call back or a response via [facility]?  Call back   Best Call Back Number: [phone]  Additional Information:   She is out and needs to pick them up today.     Thanks,  Yolette

## 2019-04-10 RX ORDER — PREGABALIN 50 MG/1
50 CAPSULE ORAL 2 TIMES DAILY
Qty: 60 CAPSULE | Refills: 1 | Status: SHIPPED | OUTPATIENT
Start: 2019-04-10 | End: 2019-06-21 | Stop reason: SDUPTHER

## 2019-04-22 DIAGNOSIS — M79.7 FIBROMYALGIA: ICD-10-CM

## 2019-04-23 RX ORDER — DULOXETIN HYDROCHLORIDE 30 MG/1
CAPSULE, DELAYED RELEASE ORAL
Qty: 90 CAPSULE | Refills: 1 | Status: SHIPPED | OUTPATIENT
Start: 2019-04-23 | End: 2019-09-02 | Stop reason: SDUPTHER

## 2019-04-23 RX ORDER — BUSPIRONE HYDROCHLORIDE 10 MG/1
TABLET ORAL
Qty: 90 TABLET | Refills: 1 | Status: SHIPPED | OUTPATIENT
Start: 2019-04-23 | End: 2019-09-02 | Stop reason: SDUPTHER

## 2019-04-25 ENCOUNTER — TELEPHONE (OUTPATIENT)
Dept: INTERNAL MEDICINE | Facility: CLINIC | Age: 64
End: 2019-04-25

## 2019-04-25 NOTE — TELEPHONE ENCOUNTER
----- Message from Henrik Maxwell sent at 4/25/2019  9:57 AM CDT -----  Contact: self  Type:  Needs Medical Advice    Who Called: pt  Symptoms (please be specific):n/a   How long has patient had these symptoms:n/a  Pharmacy name and phone #:n/a  Would the patient rather a call back or a response via [facility]? Call back  Best Call Back Number: [phone]  Additional Information: requesting call back regarding pt getting order to have blood work done.    Thanks,  Henrik Maxwell

## 2019-04-25 NOTE — TELEPHONE ENCOUNTER
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 States she will bring it in next week and asking if we can do the labs the same day. Informed her that if the labs were nothing to specific we can do them here. Verbalized understanding.

## 2019-04-29 ENCOUNTER — TELEPHONE (OUTPATIENT)
Dept: INTERNAL MEDICINE | Facility: CLINIC | Age: 64
End: 2019-04-29

## 2019-04-29 DIAGNOSIS — G60.9 IDIOPATHIC PERIPHERAL NEUROPATHY: Primary | ICD-10-CM

## 2019-04-29 NOTE — TELEPHONE ENCOUNTER
----- Message from Michele Garcia sent at 4/29/2019 10:48 AM CDT -----  Contact: [facility] @ [phone]  ..Type:  Patient Returning Call    Who Called:Francisco J   Who Left Message for Patient:Emma  Does the patient know what this is regarding?:diagnosis code G60.9 to rule out neurophathy  Would the patient rather a call back or a response via [facility]? Call back   Best Call Back Number:[phone]  Additional Information: returning call from [facility]

## 2019-04-29 NOTE — TELEPHONE ENCOUNTER
Spoke with pt, informed her that we can have the labs done at [facility], will be going to the lab to have tests done.

## 2019-04-29 NOTE — TELEPHONE ENCOUNTER
----- Message from Jaja Marina sent at 4/29/2019  3:46 PM CDT -----  Contact: self [phone]  .Type:  Patient Returning Call    Who Called:Nadia Damon  Who Left Message for Patient:pt does not know  Does the patient know what this is regarding?:no  Would the patient rather a call back or a response via [email or web address]? Call back  Best Call Back Number:[phone]  Additional Information: Pt states she missed call from provider

## 2019-05-01 ENCOUNTER — LAB VISIT (OUTPATIENT)
Dept: LAB | Facility: HOSPITAL | Age: 64
End: 2019-05-01
Attending: FAMILY MEDICINE
Payer: MEDICARE

## 2019-05-01 DIAGNOSIS — G60.9 IDIOPATHIC PERIPHERAL NEUROPATHY: ICD-10-CM

## 2019-05-01 LAB
ALP SERPL-CCNC: 130 U/L (ref 55–135)
CALCIUM SERPL-MCNC: 9.4 MG/DL (ref 8.7–10.5)
CORTIS SERPL-MCNC: 5.5 UG/DL
ESTRADIOL SERPL-MCNC: <10 PG/ML
FSH SERPL-ACNC: 170.4 MIU/ML
LH SERPL-ACNC: >600 MIU/ML
MAGNESIUM SERPL-MCNC: 1.5 MG/DL (ref 1.6–2.6)
PHOSPHATE SERPL-MCNC: 4.3 MG/DL (ref 2.7–4.5)
PROLACTIN SERPL IA-MCNC: 6.6 NG/ML (ref 5.2–26.5)
PROLACTIN SERPL IA-MCNC: 6.6 NG/ML (ref 5.2–26.5)
PTH-INTACT SERPL-MCNC: 212 PG/ML (ref 9–77)
T4 FREE SERPL-MCNC: 0.87 NG/DL (ref 0.71–1.51)
TSH SERPL DL<=0.005 MIU/L-ACNC: 3.01 UIU/ML (ref 0.4–4)

## 2019-05-01 PROCEDURE — 82670 ASSAY OF TOTAL ESTRADIOL: CPT | Mod: HCNC

## 2019-05-01 PROCEDURE — 82533 TOTAL CORTISOL: CPT | Mod: HCNC

## 2019-05-01 PROCEDURE — 36415 COLL VENOUS BLD VENIPUNCTURE: CPT | Mod: HCNC

## 2019-05-01 PROCEDURE — 82626 DEHYDROEPIANDROSTERONE: CPT | Mod: HCNC

## 2019-05-01 PROCEDURE — 84402 ASSAY OF FREE TESTOSTERONE: CPT | Mod: HCNC

## 2019-05-01 PROCEDURE — 82024 ASSAY OF ACTH: CPT | Mod: HCNC

## 2019-05-01 PROCEDURE — 83001 ASSAY OF GONADOTROPIN (FSH): CPT | Mod: HCNC

## 2019-05-01 PROCEDURE — 82672 ASSAY OF ESTROGEN: CPT | Mod: HCNC

## 2019-05-01 PROCEDURE — 83002 ASSAY OF GONADOTROPIN (LH): CPT | Mod: HCNC

## 2019-05-01 PROCEDURE — 84075 ASSAY ALKALINE PHOSPHATASE: CPT | Mod: HCNC

## 2019-05-01 PROCEDURE — 83970 ASSAY OF PARATHORMONE: CPT | Mod: HCNC

## 2019-05-01 PROCEDURE — 82310 ASSAY OF CALCIUM: CPT | Mod: HCNC

## 2019-05-01 PROCEDURE — 84439 ASSAY OF FREE THYROXINE: CPT | Mod: HCNC

## 2019-05-01 PROCEDURE — 84443 ASSAY THYROID STIM HORMONE: CPT | Mod: HCNC

## 2019-05-01 PROCEDURE — 84146 ASSAY OF PROLACTIN: CPT | Mod: HCNC

## 2019-05-01 PROCEDURE — 84305 ASSAY OF SOMATOMEDIN: CPT | Mod: HCNC

## 2019-05-01 PROCEDURE — 84100 ASSAY OF PHOSPHORUS: CPT | Mod: HCNC

## 2019-05-01 PROCEDURE — 83003 ASSAY GROWTH HORMONE (HGH): CPT | Mod: HCNC

## 2019-05-01 PROCEDURE — 83735 ASSAY OF MAGNESIUM: CPT | Mod: HCNC

## 2019-05-03 LAB
ACTH PLAS-MCNC: 14 PG/ML (ref 0–46)
GH SERPL-MCNC: 0.3 NG/ML (ref 0–8)

## 2019-05-05 LAB — DHEA SERPL-MCNC: 0.41 NG/ML (ref 0.63–4.7)

## 2019-05-06 LAB
ESTROGEN SERPL-MCNC: 121 PG/ML
TESTOST FREE SERPL-MCNC: 0.4 PG/ML

## 2019-05-08 ENCOUNTER — OFFICE VISIT (OUTPATIENT)
Dept: DERMATOLOGY | Facility: CLINIC | Age: 64
End: 2019-05-08
Payer: MEDICARE

## 2019-05-08 DIAGNOSIS — L65.9 HAIR LOSS: Primary | ICD-10-CM

## 2019-05-08 DIAGNOSIS — L82.1 SEBORRHEIC KERATOSES: ICD-10-CM

## 2019-05-08 DIAGNOSIS — L85.8 KERATOSIS PILARIS: ICD-10-CM

## 2019-05-08 LAB
IGF-I SERPL-MCNC: 81 NG/ML (ref 35–201)
IGF-I Z-SCORE SERPL: -0.39 SD

## 2019-05-08 PROCEDURE — 99213 OFFICE O/P EST LOW 20 MIN: CPT | Mod: 25,HCNC,S$GLB, | Performed by: STUDENT IN AN ORGANIZED HEALTH CARE EDUCATION/TRAINING PROGRAM

## 2019-05-08 PROCEDURE — 11900 INJECT SKIN LESIONS </W 7: CPT | Mod: HCNC,S$GLB,, | Performed by: STUDENT IN AN ORGANIZED HEALTH CARE EDUCATION/TRAINING PROGRAM

## 2019-05-08 PROCEDURE — 99999 PR PBB SHADOW E&M-EST. PATIENT-LVL II: CPT | Mod: PBBFAC,HCNC,, | Performed by: STUDENT IN AN ORGANIZED HEALTH CARE EDUCATION/TRAINING PROGRAM

## 2019-05-08 PROCEDURE — 11900 PR INJECTION INTO SKIN LESIONS, UP TO 7: ICD-10-PCS | Mod: HCNC,S$GLB,, | Performed by: STUDENT IN AN ORGANIZED HEALTH CARE EDUCATION/TRAINING PROGRAM

## 2019-05-08 PROCEDURE — 99213 PR OFFICE/OUTPT VISIT, EST, LEVL III, 20-29 MIN: ICD-10-PCS | Mod: 25,HCNC,S$GLB, | Performed by: STUDENT IN AN ORGANIZED HEALTH CARE EDUCATION/TRAINING PROGRAM

## 2019-05-08 PROCEDURE — 99999 PR PBB SHADOW E&M-EST. PATIENT-LVL II: ICD-10-PCS | Mod: PBBFAC,HCNC,, | Performed by: STUDENT IN AN ORGANIZED HEALTH CARE EDUCATION/TRAINING PROGRAM

## 2019-05-08 RX ORDER — AMMONIUM LACTATE 12 G/100G
LOTION TOPICAL
Qty: 225 G | Refills: 11 | Status: SHIPPED | OUTPATIENT
Start: 2019-05-08 | End: 2020-08-05

## 2019-05-08 RX ORDER — CLOBETASOL PROPIONATE 0.46 MG/ML
SOLUTION TOPICAL 2 TIMES DAILY
Qty: 50 ML | Refills: 2 | Status: SHIPPED | OUTPATIENT
Start: 2019-05-08 | End: 2020-10-29

## 2019-05-08 RX ORDER — KETOCONAZOLE 20 MG/ML
SHAMPOO, SUSPENSION TOPICAL
Qty: 120 ML | Refills: 5 | Status: SHIPPED | OUTPATIENT
Start: 2019-05-08 | End: 2020-08-05

## 2019-05-08 NOTE — PATIENT INSTRUCTIONS
RETINOIDS           Your doctor has prescribed a topical retinoid for your skin. A retinoid is a vitamin A derived product used to treat a variety of skin conditions including acne, actinic keratoses (pre-skin cancers), uneven pigmentation from sun damage, fine lines and wrinkles, and enlarged pores.    How do they work?         Retinoids increase skin cell turn over from the normal 30 days to five or six days, minimizing clogged pores-the major factor in acne. Retinoids can also repair the DNA in cells damaged by the sun helping to even out skin pigmentation and clear pre-skin cancers. They can shrink oil glands and minimize the appearance of large pores. These effects can not be appreciated unless the medication is used on a consistent basis!    How do I use a retinoid?         After washing with a mild cleanser (Purpose, Aqua glycolic face cleanser, Cetaphil, Neutrogena deep cream cleanser), the skin should be moisturized with a non-retinol containing moisturizer such as Cerave PM. Then a thin layer of medication is applied to the forehead, nose cheeks, and chin (and around eyes if treating fine lines and wrinkles) at night. The amount of medication needed to cover the entire face should be no more than the size of a green pea. Irritation around the eyes can be treated with Vaseline at night.    What if my skin appears dry, red, and is peeling?          Retinoids do not cause dry skin but rather they cause the top layer of the skin the shed, giving an appearance of dry skin. In fact, new healthy skin cells are replacing older, damaged cells on the surface. This usually occurs the first 2-4 weeks as the skin is adjusting to the medication. It is reasonable to use the medication every other night or even every 2 nights until your skin adjusts. You can use a MILD exfoliant to remove the peeling skin (Aveeno daily clarifying pads, Aqua glycolic face cream) and can apply a moisturizer throughout the day as needed.  Retinoids come in a variety of strengths and vehicles and your doctor can find one best for you. If you cannot tolerate prescription strength retinoids, over the counter products with retinol may be beneficial. (Olay ProX wrinkle cream, FLAKO deep wrinkle cream, Green Cream at [employer]    Will my skin be more sensitive in the sun?           You will need to use sunscreen with SPF 30 daily. Retinoids will cause the outermost layer of the skin to be thinner and thus more sensitive to ultraviolet rays. However, remember that over time, retinoids actually make the skin thicker by enhancing collagen deposition which protects the skin from sun damage.    When will I see results?            If you are using a retinoid for acne, you should see improvement in 6-8 weeks. Do not be alarmed if you find that your acne gets worse before it gets better-KEEP USING THE MEDICATION- this is a normal response and your acne will improve if you can stick with it.           If you are using the medication for anti-aging and skin dyspigmentation, you may see results in 3 months, but most effects are not visible until 6 months. Retinoids are clinically proven to reverse signs of aging, but only if used on a CONSTISTENT BASIS!             Remember that retinoids should not be used if you are pregnant.           Discontinue use 1 week prior to waxing, as skin is more likely to tear.

## 2019-05-08 NOTE — PROGRESS NOTES
Subjective:       Patient ID:  Nadia Damon is a 63 y.o. female who presents for   Chief Complaint   Patient presents with    Hair Loss     f/u hair loss to scalp x years tx mometasone     Spot     c/o spots to face and legs x months tx none      History of Present Illness: The patient presents for follow-up of traction alopecia and evaluation of a skin lesion. She was last seen on 1/31/18 where she had the scalp injected with ILK. She is also on mometasone solution and biotin supplements. She reports continued hair growth and denies any further hair loss. She does complain of having a lot of flaking and itching of the scalp. In addition, she has 2 lesions on the right leg that have been present for several months, deneies any symptoms or treatments.         Review of Systems   Skin: Negative for itching, rash and dry skin.        Objective:    Physical Exam   Constitutional: She appears well-developed and well-nourished. No distress.   Neurological: She is alert and oriented to person, place, and time. She is not disoriented.   Psychiatric: She has a normal mood and affect.   Skin:   Areas Examined (abnormalities noted in diagram):   Scalp / Hair Palpated and Inspected  Head / Face Inspection Performed  Neck Inspection Performed  RUE Inspected  LUE Inspection Performed  Nails and Digits Inspection Performed                   Diagram Legend     Erythematous scaling macule/papule c/w actinic keratosis       Vascular papule c/w angioma      Pigmented verrucoid papule/plaque c/w seborrheic keratosis      Yellow umbilicated papule c/w sebaceous hyperplasia      Irregularly shaped tan macule c/w lentigo     1-2 mm smooth white papules consistent with Milia      Movable subcutaneous cyst with punctum c/w epidermal inclusion cyst      Subcutaneous movable cyst c/w pilar cyst      Firm pink to brown papule c/w dermatofibroma      Pedunculated fleshy papule(s) c/w skin tag(s)      Evenly pigmented macule c/w  junctional nevus     Mildly variegated pigmented, slightly irregular-bordered macule c/w mildly atypical nevus      Flesh colored to evenly pigmented papule c/w intradermal nevus       Pink pearly papule/plaque c/w basal cell carcinoma      Erythematous hyperkeratotic cursted plaque c/w SCC      Surgical scar with no sign of skin cancer recurrence      Open and closed comedones      Inflammatory papules and pustules      Verrucoid papule consistent consistent with wart     Erythematous eczematous patches and plaques     Dystrophic onycholytic nail with subungual debris c/w onychomycosis     Umbilicated papule    Erythematous-base heme-crusted tan verrucoid plaque consistent with inflamed seborrheic keratosis     Erythematous Silvery Scaling Plaque c/w Psoriasis     See annotation      Assessment / Plan:        Hair loss - patient with previous ILK injections with improvement; would like to have it injected again today.   -     clobetasol (TEMOVATE) 0.05 % external solution; Apply topically 2 (two) times daily. Apply to the scalp.  Dispense: 50 mL; Refill: 2  -     ketoconazole (NIZORAL) 2 % shampoo; Apply topically 3 (three) times a week.  Dispense: 120 mL; Refill: 5  -     triamcinolone acetonide injection 10 mg       Intralesional Kenalog 5mg/cc (2 cc total) injected into 4 lesions on the scalp today after obtaining verbal consent including risk of surrounding hypopigmentation. Patient tolerated procedure well.    NDC for Kenalog 10mg/cc:  2698-8299-90      Keratosis pilaris  -     ammonium lactate (AMLACTIN) 12 % lotion; Use daily.  Apply to damp skin after bathing.  Dispense: 225 g; Refill: 11    Seborrheic keratoses  These are benign inherited growths without a malignant potential. Reassurance given to patient. No treatment is necessary.              Follow up in about 6 weeks (around 6/19/2019).

## 2019-05-20 DIAGNOSIS — F51.01 PRIMARY INSOMNIA: ICD-10-CM

## 2019-05-20 NOTE — TELEPHONE ENCOUNTER
----- Message from Antionette Alejandro sent at 5/20/2019  3:57 PM CDT -----  Contact: PATIENT  Type:  RX Refill Request    Who Called: PATIENT  Refill or New Rx:REFILL  RX Name and Strength:TEMAZEPAM 30 MG  How is the patient currently taking it? (ex. 1XDay):1 PILL NIGHTLY  Is this a 30 day or 90 day RX:30 DAY OR 90 DAY  Preferred Pharmacy with phone number:    [facility]/pharmacy #[id number] - [place] - [address] [address] [address]  Phone: [phone] Fax: [phone]      Local or Mail Order:LOCAL  Ordering Provider:CECE  Would the patient rather a call back or a response via [facility]? CALL  Best Call Back Number:[phone]  Additional Information: PLEASE CALL WHEN SENT. THANKS, JERI

## 2019-05-21 RX ORDER — TEMAZEPAM 30 MG/1
30 CAPSULE ORAL NIGHTLY
Qty: 90 CAPSULE | Refills: 0 | Status: SHIPPED | OUTPATIENT
Start: 2019-05-21 | End: 2019-08-14 | Stop reason: SDUPTHER

## 2019-05-24 ENCOUNTER — OFFICE VISIT (OUTPATIENT)
Dept: OPHTHALMOLOGY | Facility: CLINIC | Age: 64
End: 2019-05-24
Payer: MEDICARE

## 2019-05-24 DIAGNOSIS — H52.7 REFRACTIVE ERROR: ICD-10-CM

## 2019-05-24 DIAGNOSIS — H25.13 CATARACT, NUCLEAR SCLEROTIC SENILE, BILATERAL: Primary | ICD-10-CM

## 2019-05-24 DIAGNOSIS — I10 ESSENTIAL HYPERTENSION: ICD-10-CM

## 2019-05-24 PROCEDURE — 92015 PR REFRACTION: ICD-10-PCS | Mod: HCNC,S$GLB,, | Performed by: OPTOMETRIST

## 2019-05-24 PROCEDURE — 99999 PR PBB SHADOW E&M-EST. PATIENT-LVL I: ICD-10-PCS | Mod: PBBFAC,HCNC,, | Performed by: OPTOMETRIST

## 2019-05-24 PROCEDURE — 92015 DETERMINE REFRACTIVE STATE: CPT | Mod: HCNC,S$GLB,, | Performed by: OPTOMETRIST

## 2019-05-24 PROCEDURE — 99999 PR PBB SHADOW E&M-EST. PATIENT-LVL I: CPT | Mod: PBBFAC,HCNC,, | Performed by: OPTOMETRIST

## 2019-05-24 PROCEDURE — 92014 PR EYE EXAM, EST PATIENT,COMPREHESV: ICD-10-PCS | Mod: HCNC,S$GLB,, | Performed by: OPTOMETRIST

## 2019-05-24 PROCEDURE — 99499 RISK ADDL DX/OHS AUDIT: ICD-10-PCS | Mod: HCNC,S$GLB,, | Performed by: OPTOMETRIST

## 2019-05-24 PROCEDURE — 92014 COMPRE OPH EXAM EST PT 1/>: CPT | Mod: HCNC,S$GLB,, | Performed by: OPTOMETRIST

## 2019-05-24 PROCEDURE — 99499 UNLISTED E&M SERVICE: CPT | Mod: HCNC,S$GLB,, | Performed by: OPTOMETRIST

## 2019-05-24 NOTE — PROGRESS NOTES
HPI     The patient is here for her annual eye exam. The patient states her eyes   have been doing well but she can't see good out of bifocals and she   sometimes has to use a magnifying glass to see the small words. The   patient denies any ocular pain at this time.    Last edited by Lexi Fuentes on 5/24/2019  1:37 PM. (History)            Assessment /Plan     For exam results, see Encounter Report.    Cataract, nuclear sclerotic senile, bilateral    Essential hypertension    Refractive error      Moderate cataracts OU, not surgical    No HTN Retinopathy    Dispense Final Rx for glasses. Suggested increase seg height.  RTC 1 year  Discussed above and answered questions.

## 2019-05-27 ENCOUNTER — PES CALL (OUTPATIENT)
Dept: ADMINISTRATIVE | Facility: CLINIC | Age: 64
End: 2019-05-27

## 2019-05-28 ENCOUNTER — LAB VISIT (OUTPATIENT)
Dept: LAB | Facility: HOSPITAL | Age: 64
End: 2019-05-28
Attending: INTERNAL MEDICINE
Payer: MEDICARE

## 2019-05-28 DIAGNOSIS — N20.0 NEPHROLITHIASIS: ICD-10-CM

## 2019-05-28 LAB
ALBUMIN SERPL BCP-MCNC: 3.4 G/DL (ref 3.5–5.2)
ANION GAP SERPL CALC-SCNC: 7 MMOL/L (ref 8–16)
BUN SERPL-MCNC: 25 MG/DL (ref 8–23)
CALCIUM SERPL-MCNC: 9.3 MG/DL (ref 8.7–10.5)
CHLORIDE SERPL-SCNC: 103 MMOL/L (ref 95–110)
CO2 SERPL-SCNC: 28 MMOL/L (ref 23–29)
CREAT SERPL-MCNC: 1.5 MG/DL (ref 0.5–1.4)
EST. GFR  (AFRICAN AMERICAN): 42.1 ML/MIN/1.73 M^2
EST. GFR  (NON AFRICAN AMERICAN): 36.6 ML/MIN/1.73 M^2
GLUCOSE SERPL-MCNC: 86 MG/DL (ref 70–110)
PHOSPHATE SERPL-MCNC: 3.9 MG/DL (ref 2.7–4.5)
POTASSIUM SERPL-SCNC: 5.7 MMOL/L (ref 3.5–5.1)
SODIUM SERPL-SCNC: 138 MMOL/L (ref 136–145)
URATE SERPL-MCNC: 6.5 MG/DL (ref 2.4–5.7)

## 2019-05-28 PROCEDURE — 84550 ASSAY OF BLOOD/URIC ACID: CPT | Mod: HCNC

## 2019-05-28 PROCEDURE — 36415 COLL VENOUS BLD VENIPUNCTURE: CPT | Mod: HCNC

## 2019-05-28 PROCEDURE — 80069 RENAL FUNCTION PANEL: CPT | Mod: HCNC

## 2019-05-29 ENCOUNTER — OFFICE VISIT (OUTPATIENT)
Dept: NEPHROLOGY | Facility: CLINIC | Age: 64
End: 2019-05-29
Payer: MEDICARE

## 2019-05-29 VITALS
HEART RATE: 88 BPM | HEIGHT: 62 IN | BODY MASS INDEX: 28.76 KG/M2 | WEIGHT: 156.31 LBS | DIASTOLIC BLOOD PRESSURE: 64 MMHG | SYSTOLIC BLOOD PRESSURE: 124 MMHG

## 2019-05-29 DIAGNOSIS — Z71.89 ENCOUNTER FOR MEDICATION REVIEW AND COUNSELING: ICD-10-CM

## 2019-05-29 DIAGNOSIS — E87.5 HYPERKALEMIA: ICD-10-CM

## 2019-05-29 DIAGNOSIS — N20.0 NEPHROLITHIASIS: Primary | ICD-10-CM

## 2019-05-29 DIAGNOSIS — E21.3 HYPERPARATHYROIDISM: ICD-10-CM

## 2019-05-29 PROCEDURE — 99215 PR OFFICE/OUTPT VISIT, EST, LEVL V, 40-54 MIN: ICD-10-PCS | Mod: HCNC,S$GLB,, | Performed by: INTERNAL MEDICINE

## 2019-05-29 PROCEDURE — 3074F SYST BP LT 130 MM HG: CPT | Mod: HCNC,CPTII,S$GLB, | Performed by: INTERNAL MEDICINE

## 2019-05-29 PROCEDURE — 3074F PR MOST RECENT SYSTOLIC BLOOD PRESSURE < 130 MM HG: ICD-10-PCS | Mod: HCNC,CPTII,S$GLB, | Performed by: INTERNAL MEDICINE

## 2019-05-29 PROCEDURE — 3078F PR MOST RECENT DIASTOLIC BLOOD PRESSURE < 80 MM HG: ICD-10-PCS | Mod: HCNC,CPTII,S$GLB, | Performed by: INTERNAL MEDICINE

## 2019-05-29 PROCEDURE — 3008F BODY MASS INDEX DOCD: CPT | Mod: HCNC,CPTII,S$GLB, | Performed by: INTERNAL MEDICINE

## 2019-05-29 PROCEDURE — 99999 PR PBB SHADOW E&M-EST. PATIENT-LVL V: CPT | Mod: PBBFAC,HCNC,, | Performed by: INTERNAL MEDICINE

## 2019-05-29 PROCEDURE — 3008F PR BODY MASS INDEX (BMI) DOCUMENTED: ICD-10-PCS | Mod: HCNC,CPTII,S$GLB, | Performed by: INTERNAL MEDICINE

## 2019-05-29 PROCEDURE — 3078F DIAST BP <80 MM HG: CPT | Mod: HCNC,CPTII,S$GLB, | Performed by: INTERNAL MEDICINE

## 2019-05-29 PROCEDURE — 99999 PR PBB SHADOW E&M-EST. PATIENT-LVL V: ICD-10-PCS | Mod: PBBFAC,HCNC,, | Performed by: INTERNAL MEDICINE

## 2019-05-29 PROCEDURE — 99215 OFFICE O/P EST HI 40 MIN: CPT | Mod: HCNC,S$GLB,, | Performed by: INTERNAL MEDICINE

## 2019-05-29 NOTE — PROGRESS NOTES
NEPHROLOGY CLINIC FOLLOWUP NOTE  Date of clinic visit: 5/29/19     REASON FOR FOLLOWUP AND CHIEF COMPLAINT: nephrolithiasis and history of chronic kidney disease post-heart transplant.     HISTORY OF PRESENT ILLNESS: Ms. Damon is a 63-year-old AA female with a history of CKD stage 3 and heart transplant in 1993, who presents for f/u. Pt was last seen 3 months ago. Pt reports no new stones. No discomfort today, no back pain, no fever. Pt has followed medical advice regarding avoiding foods that are risk factor for kidney stones. Noted HCTZ was stopped due to low BP.       To review:   Pt previously developed right sided back pain, thought to be related to kidney stones identified on renal u/s. The stones in the R kidney were non-obstructive but were relatively large at 10 and 12 mm each. Based on h/o of taking cyclosporine to prevent heart transplant rejection and eating a lot of sea food (shrimp and crawfish), uric acid stones were suspected. Pt had additional risk factors for non-uric acid stones, including taking Vit C 1 g/day and Vit D. After dietary modification, the flank pain resolved. A f/u CT scan did not show any stones. It is possible that she passed the 2 stones, as uric acid stones are soluble on urinary alkalinization and can pass by themselves. Pt was advised to stop Vit C and Vit D, which she has. She was also advised on drinking freshly squeezed lemon juice and follow dietary recommendations to avoid nephrolithiasis risk factors. Pt was referred also to urology. A repeat CT scan did not show any stones. Pt obtained a 24 hour urine collection for kidney stone risk stratification which showed mild hypocitraturia.         To review:  Pt has CKD       PAST MEDICAL HISTORY:  1. CKD stage III. 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 Hypertension.  3. Heart transplant for cardiomyopathy in 1993, the patient has been on  chronic  cyclosporine treatment.  4. Carpal tunnel syndrome.  5. Fibromyalgia.  6. GERD.  7. Bell's palsy.  8. Depression.     PAST SURGICAL HISTORY: Reviewed as above, unchanged.     MEDICATIONS: Reviewed    Current Outpatient Medications:     acetaminophen 500 mg/15 mL Liqd, , Disp: , Rfl:     acetaminophen-codeine 300-30mg (TYLENOL #3) 300-30 mg Tab, Take 1 tablet by mouth every 4 to 6 hours as needed., Disp: 20 tablet, Rfl: 0    ALPRAZolam (XANAX) 0.5 MG tablet, TAKE 1 TABLET (0.5 MG TOTAL) THREE TIMES DAILY AS NEEDED FOR ANXIETY, Disp: 90 tablet, Rfl: 0    amlodipine (NORVASC) 5 MG tablet, Take 5 mg by mouth once daily., Disp: , Rfl:     ammonium lactate (AMLACTIN) 12 % lotion, Use daily.  Apply to damp skin after bathing., Disp: 225 g, Rfl: 11    aspirin (ECOTRIN) 81 MG EC tablet, Take 1 tablet (81 mg total) by mouth once daily., Disp: 30 tablet, Rfl: 11    baclofen (LIORESAL) 10 MG tablet, TAKE 1 TABLET THREE  TIMES DAILY AS NEEDED FOR MUSCLE SPASM(S)., Disp: 270 tablet, Rfl: 1    benzocaine-menthol 15-2.6 mg Lozg, , Disp: , Rfl:     biotin 10,000 mcg Cap, Take 1 tablet by mouth once daily., Disp: , Rfl:     busPIRone (BUSPAR) 10 MG tablet, TAKE 1 TABLET EVERY DAY, Disp: 90 tablet, Rfl: 1    clobetasol (TEMOVATE) 0.05 % external solution, Apply topically 2 (two) times daily. Apply to the scalp., Disp: 50 mL, Rfl: 2    cycloSPORINE modified, NEORAL, (NEORAL) 100 MG capsule, TAKE 1 CAPSULE EVERY DAY, Disp: 90 capsule, Rfl: 3    cycloSPORINE modified, NEORAL, (NEORAL) 25 MG capsule, TAKE 3 CAPSULES ONCE A DAY, Disp: 270 capsule, Rfl: 3    docusate sodium (COLACE) 100 MG capsule, Take 100 mg by mouth 2 (two) times daily., Disp: , Rfl:     DULoxetine (CYMBALTA) 30 MG capsule, TAKE 1 CAPSULE EVERY DAY, Disp: 90 capsule, Rfl: 1    estradiol (ESTRACE) 0.01 % (0.1 mg/gram) vaginal cream, Place 1 g vaginally twice a week., Disp: 1 Tube, Rfl: 12    EVENING PRIMROSE OIL ORAL, Take 1,000 mg by mouth once  daily., Disp: , Rfl:     ferrous sulfate (FEOSOL) 325 mg (65 mg iron) Tab tablet, Take 1 tablet (325 mg total) by mouth once daily., Disp: 100 tablet, Rfl: 3    fluticasone (FLONASE) 50 mcg/actuation nasal spray, 2 sprays by Each Nare route once daily. (Patient taking differently: 2 sprays by Each Nare route daily as needed. ), Disp: 1 Bottle, Rfl: 0    ketoconazole (NIZORAL) 2 % shampoo, Apply topically 3 (three) times a week., Disp: 120 mL, Rfl: 5    leg brace (ANKLE SUPPORT MISC), , Disp: , Rfl:     metoprolol succinate (TOPROL-XL) 25 MG 24 hr tablet, TAKE 1 TABLET EVERY DAY, Disp: 90 tablet, Rfl: 3    multivitamin capsule, Take 1 capsule by mouth once daily., Disp: , Rfl:     omeprazole (PRILOSEC) 10 MG capsule, TAKE 1 CAPSULE EVERY DAY, Disp: 90 capsule, Rfl: 3    pregabalin (LYRICA) 50 MG capsule, Take 1 capsule (50 mg total) by mouth 2 (two) times daily., Disp: 60 capsule, Rfl: 1    temazepam (RESTORIL) 30 mg capsule, Take 1 capsule (30 mg total) by mouth nightly., Disp: 90 capsule, Rfl: 0    vitamin E 400 UNIT capsule, Take 400 Units by mouth once daily., Disp: , Rfl:     zolpidem (AMBIEN) 10 mg Tab, TAKE ONE TABLET BY MOUTH EVERY EVENING., Disp: 30 tablet, Rfl: 2    mometasone (ELOCON) 0.1 % lotion, Apply topically once daily., Disp: 180 mL, Rfl: 3    Current Facility-Administered Medications:     triamcinolone acetonide injection 10 mg, 10 mg, Intradermal, 1 time in Clinic/HOD, Jose Roland MD     SOCIAL HISTORY: Reviewed. Negative for smoking. No alcohol use.      REVIEW OF SYSTEMS: No recent hospitalizations.  GENERAL: Negative.  HEAD, EYES, EARS, NOSE, AND THROAT: Negative.  CARDIAC: Negative.  PULMONARY: Negative.  GASTROINTESTINAL: Negative.  GENITOURINARY: Negative.  PSYCHOLOGICAL: Negative.  NEUROLOGICAL: Negative.  ENDOCRINE: Negative.  HEMATOLOGIC AND ONCOLOGIC: Negative.  INFECTIOUS DISEASE: Negative.  The rest of the review of systems negative.     PHYSICAL EXAMINATION:  VITAL  SIGNS: Repeat blood pressure is 124/64. Pulse is 88, weight is 70.9 Kg, last visit 64.5 Kg  GENERAL: She is cooperative, pleasant, in no acute distress.   Speech and thought process appropriate, normal.  HEENT: Mucous membranes moist.  NECK: Neck JVD. Normal hearing.  ABDOMEN: Soft, nontender.  Side and back: mid back, non tender, no sign of trauma  EXTREMITIES: no edema.     LABS: Reviewed. No recent labs.  BMP  Lab Results   Component Value Date     05/28/2019    K 5.7 (H) 05/28/2019     05/28/2019    CO2 28 05/28/2019    BUN 25 (H) 05/28/2019    CREATININE 1.5 (H) 05/28/2019    CALCIUM 9.3 05/28/2019    ANIONGAP 7 (L) 05/28/2019    ESTGFRAFRICA 42.1 (A) 05/28/2019    EGFRNONAA 36.6 (A) 05/28/2019     Lab Results   Component Value Date    .0 (H) 05/01/2019    CALCIUM 9.3 05/28/2019    CAION 1.13 09/05/2018    PHOS 3.9 05/28/2019     Lab Results   Component Value Date    URICACID 6.5 (H) 05/28/2019       To review:   24 hour urine: Ca not measured, uric acid 138, Oxalate 20, citrate 203  U/a unremarkable  Urine Cx: neg   Urine P/Cr 740 mg     KUB: unremarkable, except for some constipation     Renal u/s: FINDINGS:  Right kidney: The right kidney measures 9.6 cm. Mild cortical thinning and increased cortical echogenicity.  10 and 12 mm stones are seen in the upper and lower poles respectively.  No cortical thinning. No loss of corticomedullary distinction. No mass. No renal stone. No hydronephrosis.  Left kidney: The left kidney measures 9.3 cm. Mild cortical thinning and increased cortical echogenicity. No loss of corticomedullary distinction. No mass. No renal stone. No hydronephrosis.  The bladder is partially distended at the time of scanning and has an unremarkable appearance.     CT abd: The left kidney appears slightly small.  Right kidney is grossly normal in size.  No obvious hydronephrosis or nephrolithiasis          ASSESSMENT AND PLAN: This is a 63-year-old female who presents for  follow up of nephrolithiasis and CKD   Patient has a h/o of heart transplant  The impression is as follows:     1. Nephrolithiasis: has multiple risk factors for kidney stones: (hyperuricemia from cyclosporine, diet rich in uric acid, previously taking Vit C, vit D, and calcium,, CKD, and having hyperparathyroidism)  Stones are likely mixed ca oxalate and uric acid kidney stones  Advised in layman's language  Mild hyperuricemia    No new stones  F/u CT did not show any stones  U/s earlier showed non-obstructive 2 R stones, relatively large  Ralette have passed the stones     24 hour urine for kidney stone stratification shows hypocitraturia  Pt was advised NOT TO STOP cyclosporine.     Pt was again advised of dietary risk factors for kidney stones, was given printed literature  Advised pt to:  -keep water intake to >2 L/day  -keep salt in diet to <3 g/day  -reduce meat intake, specially seafood  -keep Ca intake average  -NoVit C  -No Vit D     2. Renal. 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 HTN: BP controlled  Reviewed meds with pt  Please note that HCTZ can be used for stone prevention (is hypocalciuric), HCTZ was stopped for low BP     4. History of heart transplant on cyclosporine.  Per heart transplant team.     5. Proteinuria. Mild. Less than 1 g per day  Repeat is lower 740 mg.  Will monitor  Would benefit from an ACE inhibitor or angiotensin receptor blocker, but has mild hyperkalemia     6. Elevated iPTH, may have primary hyperparathyroidism (HPT)  Primary HPT can explain increased risk of kidney stones        PLANS AND RECOMMENDATIONS:   As discussed above  RTC 4 months  Multiple issues addressed  Advised pt to avoid high K foods, was given a list of such foods  Ordered sestamibi nuclear scan of the  neck to r/o parathyroid gland adenomas  Total time spent 40 minutes. Extensive time spent including the time to review the records, the patient evaluation, documentation, face-to-face  discussion with the patient. More than 50% of the time was spent in counseling  and coordination of care. Level V visit.     Carter Crawford MD

## 2019-06-06 ENCOUNTER — TELEPHONE (OUTPATIENT)
Dept: TRANSPLANT | Facility: CLINIC | Age: 64
End: 2019-06-06

## 2019-06-06 NOTE — TELEPHONE ENCOUNTER
Spoke with Ms. Nelson regarding her f/u in August and September. Pt saw Dr. Hunter Lopez Cardiologist and will be getting an echo next week, labs scheduled at [facility] for Monday 6/10/19.    Sent Dr. Lopez a copy of her last stress echo done in Sept 2018.

## 2019-06-10 ENCOUNTER — LAB VISIT (OUTPATIENT)
Dept: LAB | Facility: HOSPITAL | Age: 64
End: 2019-06-10
Attending: FAMILY MEDICINE
Payer: MEDICARE

## 2019-06-10 DIAGNOSIS — T86.20 COMPLICATION OF HEART TRANSPLANT, UNSPECIFIED COMPLICATION: ICD-10-CM

## 2019-06-10 DIAGNOSIS — Z94.1 STATUS POST HEART TRANSPLANT: ICD-10-CM

## 2019-06-10 DIAGNOSIS — Z79.52 LONG TERM CURRENT USE OF SYSTEMIC STEROIDS: ICD-10-CM

## 2019-06-10 DIAGNOSIS — Z79.899 ENCOUNTER FOR LONG-TERM (CURRENT) USE OF MEDICATIONS: ICD-10-CM

## 2019-06-10 LAB
ALBUMIN SERPL BCP-MCNC: 3.6 G/DL (ref 3.5–5.2)
ALP SERPL-CCNC: 149 U/L (ref 55–135)
ALT SERPL W/O P-5'-P-CCNC: 34 U/L (ref 10–44)
ANION GAP SERPL CALC-SCNC: 13 MMOL/L (ref 8–16)
AST SERPL-CCNC: 45 U/L (ref 10–40)
BASOPHILS # BLD AUTO: 0.02 K/UL (ref 0–0.2)
BASOPHILS NFR BLD: 0.7 % (ref 0–1.9)
BILIRUB SERPL-MCNC: 0.6 MG/DL (ref 0.1–1)
BUN SERPL-MCNC: 22 MG/DL (ref 8–23)
CALCIUM SERPL-MCNC: 10 MG/DL (ref 8.7–10.5)
CHLORIDE SERPL-SCNC: 106 MMOL/L (ref 95–110)
CO2 SERPL-SCNC: 24 MMOL/L (ref 23–29)
CREAT SERPL-MCNC: 1.5 MG/DL (ref 0.5–1.4)
DIFFERENTIAL METHOD: ABNORMAL
EOSINOPHIL # BLD AUTO: 0.1 K/UL (ref 0–0.5)
EOSINOPHIL NFR BLD: 3.9 % (ref 0–8)
ERYTHROCYTE [DISTWIDTH] IN BLOOD BY AUTOMATED COUNT: 15 % (ref 11.5–14.5)
EST. GFR  (AFRICAN AMERICAN): 42.1 ML/MIN/1.73 M^2
EST. GFR  (NON AFRICAN AMERICAN): 36.6 ML/MIN/1.73 M^2
GLUCOSE SERPL-MCNC: 105 MG/DL (ref 70–110)
HCT VFR BLD AUTO: 39.3 % (ref 37–48.5)
HGB BLD-MCNC: 12.1 G/DL (ref 12–16)
IMM GRANULOCYTES # BLD AUTO: 0.01 K/UL (ref 0–0.04)
IMM GRANULOCYTES NFR BLD AUTO: 0.4 % (ref 0–0.5)
LYMPHOCYTES # BLD AUTO: 1.3 K/UL (ref 1–4.8)
LYMPHOCYTES NFR BLD: 44 % (ref 18–48)
MAGNESIUM SERPL-MCNC: 1.7 MG/DL (ref 1.6–2.6)
MCH RBC QN AUTO: 27.9 PG (ref 27–31)
MCHC RBC AUTO-ENTMCNC: 30.8 G/DL (ref 32–36)
MCV RBC AUTO: 91 FL (ref 82–98)
MONOCYTES # BLD AUTO: 0.3 K/UL (ref 0.3–1)
MONOCYTES NFR BLD: 12 % (ref 4–15)
NEUTROPHILS # BLD AUTO: 1.1 K/UL (ref 1.8–7.7)
NEUTROPHILS NFR BLD: 39 % (ref 38–73)
NRBC BLD-RTO: 0 /100 WBC
PLATELET # BLD AUTO: 310 K/UL (ref 150–350)
PMV BLD AUTO: 9.6 FL (ref 9.2–12.9)
POTASSIUM SERPL-SCNC: 3.6 MMOL/L (ref 3.5–5.1)
PROT SERPL-MCNC: 8.8 G/DL (ref 6–8.4)
RBC # BLD AUTO: 4.33 M/UL (ref 4–5.4)
SODIUM SERPL-SCNC: 143 MMOL/L (ref 136–145)
WBC # BLD AUTO: 2.84 K/UL (ref 3.9–12.7)

## 2019-06-10 PROCEDURE — 86833 HLA CLASS II HIGH DEFIN QUAL: CPT | Mod: HCNC

## 2019-06-10 PROCEDURE — 86833 HLA CLASS II HIGH DEFIN QUAL: CPT | Mod: 59,HCNC

## 2019-06-10 PROCEDURE — 86977 RBC SERUM PRETX INCUBJ/INHIB: CPT | Mod: HCNC

## 2019-06-10 PROCEDURE — 86832 HLA CLASS I HIGH DEFIN QUAL: CPT | Mod: HCNC

## 2019-06-10 PROCEDURE — 85025 COMPLETE CBC W/AUTO DIFF WBC: CPT | Mod: HCNC

## 2019-06-10 PROCEDURE — 80053 COMPREHEN METABOLIC PANEL: CPT | Mod: HCNC

## 2019-06-10 PROCEDURE — 86832 HLA CLASS I HIGH DEFIN QUAL: CPT | Mod: 59,HCNC

## 2019-06-10 PROCEDURE — 83735 ASSAY OF MAGNESIUM: CPT | Mod: HCNC

## 2019-06-10 PROCEDURE — 36415 COLL VENOUS BLD VENIPUNCTURE: CPT | Mod: HCNC

## 2019-06-10 PROCEDURE — 80158 DRUG ASSAY CYCLOSPORINE: CPT | Mod: HCNC

## 2019-06-11 LAB
CLASS I ANTIBODY COMMENTS - LUMINEX: NORMAL
CLASS II ANTIBODY COMMENTS - LUMINEX: NORMAL
CYCLOSPORINE BLD LC/MS/MS-MCNC: 133 NG/ML (ref 100–400)
DSA1 TESTING DATE: NORMAL
DSA12 TESTING DATE: NORMAL
DSA2 TESTING DATE: NORMAL
SERUM COLLECTION DT - LUMINEX CLASS I: NORMAL
SERUM COLLECTION DT - LUMINEX CLASS II: NORMAL

## 2019-06-17 ENCOUNTER — OFFICE VISIT (OUTPATIENT)
Dept: INTERNAL MEDICINE | Facility: CLINIC | Age: 64
End: 2019-06-17
Payer: MEDICARE

## 2019-06-17 VITALS
TEMPERATURE: 100 F | SYSTOLIC BLOOD PRESSURE: 124 MMHG | DIASTOLIC BLOOD PRESSURE: 70 MMHG | HEIGHT: 62 IN | BODY MASS INDEX: 29.49 KG/M2 | OXYGEN SATURATION: 97 % | WEIGHT: 160.25 LBS | HEART RATE: 104 BPM

## 2019-06-17 DIAGNOSIS — D84.9 IMMUNOCOMPROMISED PATIENT: ICD-10-CM

## 2019-06-17 DIAGNOSIS — Z94.1 HEART TRANSPLANTED: ICD-10-CM

## 2019-06-17 DIAGNOSIS — J40 BRONCHITIS: Primary | ICD-10-CM

## 2019-06-17 DIAGNOSIS — N18.4 CKD (CHRONIC KIDNEY DISEASE) STAGE 4, GFR 15-29 ML/MIN: ICD-10-CM

## 2019-06-17 PROCEDURE — 3008F BODY MASS INDEX DOCD: CPT | Mod: HCNC,CPTII,S$GLB, | Performed by: FAMILY MEDICINE

## 2019-06-17 PROCEDURE — 3074F PR MOST RECENT SYSTOLIC BLOOD PRESSURE < 130 MM HG: ICD-10-PCS | Mod: HCNC,CPTII,S$GLB, | Performed by: FAMILY MEDICINE

## 2019-06-17 PROCEDURE — 3008F PR BODY MASS INDEX (BMI) DOCUMENTED: ICD-10-PCS | Mod: HCNC,CPTII,S$GLB, | Performed by: FAMILY MEDICINE

## 2019-06-17 PROCEDURE — 3074F SYST BP LT 130 MM HG: CPT | Mod: HCNC,CPTII,S$GLB, | Performed by: FAMILY MEDICINE

## 2019-06-17 PROCEDURE — 99999 PR PBB SHADOW E&M-EST. PATIENT-LVL V: ICD-10-PCS | Mod: PBBFAC,HCNC,, | Performed by: FAMILY MEDICINE

## 2019-06-17 PROCEDURE — 99214 PR OFFICE/OUTPT VISIT, EST, LEVL IV, 30-39 MIN: ICD-10-PCS | Mod: HCNC,S$GLB,, | Performed by: FAMILY MEDICINE

## 2019-06-17 PROCEDURE — 3078F DIAST BP <80 MM HG: CPT | Mod: HCNC,CPTII,S$GLB, | Performed by: FAMILY MEDICINE

## 2019-06-17 PROCEDURE — 3078F PR MOST RECENT DIASTOLIC BLOOD PRESSURE < 80 MM HG: ICD-10-PCS | Mod: HCNC,CPTII,S$GLB, | Performed by: FAMILY MEDICINE

## 2019-06-17 PROCEDURE — 99214 OFFICE O/P EST MOD 30 MIN: CPT | Mod: HCNC,S$GLB,, | Performed by: FAMILY MEDICINE

## 2019-06-17 PROCEDURE — 99999 PR PBB SHADOW E&M-EST. PATIENT-LVL V: CPT | Mod: PBBFAC,HCNC,, | Performed by: FAMILY MEDICINE

## 2019-06-17 RX ORDER — AMOXICILLIN AND CLAVULANATE POTASSIUM 500; 125 MG/1; MG/1
1 TABLET, FILM COATED ORAL 2 TIMES DAILY
Qty: 20 TABLET | Refills: 0 | Status: SHIPPED | OUTPATIENT
Start: 2019-06-17 | End: 2019-06-24

## 2019-06-17 NOTE — PROGRESS NOTES
Subjective:       Patient ID: Nadia Damon is a 63 y.o. female.    Chief Complaint: Chills (since thursday); Generalized Body Aches; Headache; Sore Throat; Otalgia (righ ear); and Eye Drainage (when she wakes up her eyes are covered in cold)    Five days duration nasal congestion postnasal drip sore throat productive cough fever chills.  Sputum is purulent.  Medical history includes heart transplant on immunosuppressive medication, chronic kidney disease, hypertension.  She denies shortness of breath or pleuritic chest pain.    Review of Systems   Constitutional: Positive for appetite change, chills, fatigue and fever. Negative for diaphoresis.   HENT: Positive for congestion, postnasal drip and sore throat. Negative for sinus pressure.    Eyes: Negative for discharge and redness.   Respiratory: Positive for cough. Negative for chest tightness, shortness of breath and wheezing.    Cardiovascular: Negative for chest pain, palpitations and leg swelling.        Denies lightheadedness   Gastrointestinal: Negative for abdominal pain, diarrhea, nausea and vomiting.       Objective:      Physical Exam   Constitutional: She appears well-developed and well-nourished. No distress.   Appears mildly weak   HENT:   Right Ear: External ear normal.   Left Ear: External ear normal.   Mouth/Throat: Oropharynx is clear and moist.   Nasal congestion   Eyes: Conjunctivae are normal.   Cardiovascular: Regular rhythm.   No murmur heard.  Rate of 104   Pulmonary/Chest: Effort normal. No respiratory distress. She has no wheezes. She has no rales.   O2 sat 97%.   Lymphadenopathy:     She has no cervical adenopathy.   Skin: She is not diaphoretic.       Lab Visit on 06/10/2019   Component Date Value Ref Range Status    WBC 06/10/2019 2.84* 3.90 - 12.70 K/uL Final    RBC 06/10/2019 4.33  4.00 - 5.40 M/uL Final    Hemoglobin 06/10/2019 12.1  12.0 - 16.0 g/dL Final    Hematocrit 06/10/2019 39.3  37.0 - 48.5 % Final    Mean Corpuscular  Volume 06/10/2019 91  82 - 98 fL Final    Mean Corpuscular Hemoglobin 06/10/2019 27.9  27.0 - 31.0 pg Final    Mean Corpuscular Hemoglobin Conc 06/10/2019 30.8* 32.0 - 36.0 g/dL Final    RDW 06/10/2019 15.0* 11.5 - 14.5 % Final    Platelets 06/10/2019 310  150 - 350 K/uL Final    MPV 06/10/2019 9.6  9.2 - 12.9 fL Final    Immature Granulocytes 06/10/2019 0.4  0.0 - 0.5 % Final    Gran # (ANC) 06/10/2019 1.1* 1.8 - 7.7 K/uL Final    Immature Grans (Abs) 06/10/2019 0.01  0.00 - 0.04 K/uL Final    Lymph # 06/10/2019 1.3  1.0 - 4.8 K/uL Final    Mono # 06/10/2019 0.3  0.3 - 1.0 K/uL Final    Eos # 06/10/2019 0.1  0.0 - 0.5 K/uL Final    Baso # 06/10/2019 0.02  0.00 - 0.20 K/uL Final    nRBC 06/10/2019 0  0 /100 WBC Final    Gran% 06/10/2019 39.0  38.0 - 73.0 % Final    Lymph% 06/10/2019 44.0  18.0 - 48.0 % Final    Mono% 06/10/2019 12.0  4.0 - 15.0 % Final    Eosinophil% 06/10/2019 3.9  0.0 - 8.0 % Final    Basophil% 06/10/2019 0.7  0.0 - 1.9 % Final    Differential Method 06/10/2019 Automated   Final    Sodium 06/10/2019 143  136 - 145 mmol/L Final    Potassium 06/10/2019 3.6  3.5 - 5.1 mmol/L Final    Chloride 06/10/2019 106  95 - 110 mmol/L Final    CO2 06/10/2019 24  23 - 29 mmol/L Final    Glucose 06/10/2019 105  70 - 110 mg/dL Final    BUN, Bld 06/10/2019 22  8 - 23 mg/dL Final    Creatinine 06/10/2019 1.5* 0.5 - 1.4 mg/dL Final    Calcium 06/10/2019 10.0  8.7 - 10.5 mg/dL Final    Total Protein 06/10/2019 8.8* 6.0 - 8.4 g/dL Final    Albumin 06/10/2019 3.6  3.5 - 5.2 g/dL Final    Total Bilirubin 06/10/2019 0.6  0.1 - 1.0 mg/dL Final    Alkaline Phosphatase 06/10/2019 149* 55 - 135 U/L Final    AST 06/10/2019 45* 10 - 40 U/L Final    ALT 06/10/2019 34  10 - 44 U/L Final    Anion Gap 06/10/2019 13  8 - 16 mmol/L Final    eGFR if  06/10/2019 42.1* >60 mL/min/1.73 m^2 Final    eGFR if non African American 06/10/2019 36.6* >60 mL/min/1.73 m^2 Final    HC1Q Testing  Date 06/10/2019 06/13/2019 12:26 PM   Final    C1Q1 Testing Date 06/10/2019 06/13/2019 12:00 AM   Final    Serum Collection DT - [facility] Cl[facility]* 06/10/2019 06/10/2019 09:50 AM   Final    Class I Antibody Comments - [facility] 06/10/2019 NO DSA DETECTED   Final    C1Q2 Testing Date 06/10/2019 06/13/2019 12:00 AM   Final    Serum Collection DT - [facility] Clas* 06/10/2019 06/10/2019 09:50 AM   Final    Class II Antibody Comments - [facility]* 06/10/2019 NO DSA DETECTED   Final    DSA12 Testing Date 06/10/2019 06/11/2019 04:21 PM   Final    DSA1 Testing Date 06/10/2019 06/11/2019 12:00 AM   Final    Serum Collection DT - [facility] Clas* 06/10/2019 06/10/2019 09:50 AM   Final    Class I Antibody Comments - [facility] 06/10/2019 NO DSA DETECTED   Final    DSA2 Testing Date 06/10/2019 06/11/2019 12:00 AM   Final    Serum Collection DT - [facility] Clas* 06/10/2019 06/10/2019 09:50 AM   Final    Class II Antibody Comments - [facility]umin* 06/10/2019 WEAK DSA DETECTED: [id number])   Final    Magnesium 06/10/2019 1.7  1.6 - 2.6 mg/dL Final    Cyclosporine, LC/MS 06/10/2019 133  100 - 400 ng/mL Final     Assessment:       No diagnosis found.    Plan:     Fluids rest augmentin Robitussin DM 2 tsp 3 times a day follow-up in 72 hr if still febrile    There are no diagnoses linked to this encounter.

## 2019-06-18 ENCOUNTER — TELEPHONE (OUTPATIENT)
Dept: INTERNAL MEDICINE | Facility: CLINIC | Age: 64
End: 2019-06-18

## 2019-06-18 NOTE — TELEPHONE ENCOUNTER
----- Message from Ayanna Stein sent at 6/18/2019  2:31 PM CDT -----  Contact: pt   Pt is calling in regards to getting a after summary report. Pt states that if it can be printed up and left at the  and she will stop by and pick it up. Pt would like the nurse to call her once the summary is ready to be picked up.    [phone] (home)

## 2019-06-19 DIAGNOSIS — M62.838 MUSCLE SPASMS OF BOTH LOWER EXTREMITIES: ICD-10-CM

## 2019-06-20 RX ORDER — BACLOFEN 10 MG/1
TABLET ORAL
Qty: 270 TABLET | Refills: 1 | Status: SHIPPED | OUTPATIENT
Start: 2019-06-20 | End: 2019-11-18 | Stop reason: SDUPTHER

## 2019-06-21 DIAGNOSIS — M79.7 FIBROMYALGIA: ICD-10-CM

## 2019-06-24 ENCOUNTER — TELEPHONE (OUTPATIENT)
Dept: CARDIOLOGY | Facility: CLINIC | Age: 64
End: 2019-06-24

## 2019-06-24 ENCOUNTER — TELEPHONE (OUTPATIENT)
Dept: INTERNAL MEDICINE | Facility: CLINIC | Age: 64
End: 2019-06-24

## 2019-06-24 RX ORDER — PREGABALIN 50 MG/1
CAPSULE ORAL
Qty: 60 CAPSULE | Refills: 1 | Status: SHIPPED | OUTPATIENT
Start: 2019-06-24 | End: 2019-12-09 | Stop reason: SDUPTHER

## 2019-06-24 RX ORDER — CEPHALEXIN 250 MG/1
250 CAPSULE ORAL EVERY 6 HOURS
Qty: 40 CAPSULE | Refills: 0 | Status: SHIPPED | OUTPATIENT
Start: 2019-06-24 | End: 2019-07-22

## 2019-06-24 NOTE — TELEPHONE ENCOUNTER
----- Message from Amanda Hart sent at 6/21/2019  4:44 PM CDT -----  Contact: Self  Patient requesting a call back regarding EKG. She states that she has already had one on 06/13/19 for a different provider and her insurance will most likely not pay for a second so soon. Patient states that she will speak with her cardiologist but would still like someone to give her a call. Please call Ms. Nadia back at [phone]

## 2019-06-24 NOTE — TELEPHONE ENCOUNTER
Informed pt that her med was changed. States that she feels better and not coughing, no fever either. States she is weak because of the diarrhea. Informed her to get some Imodium and see if that helps. Verbalized understanding.  Apt made for Wednesday.

## 2019-06-24 NOTE — TELEPHONE ENCOUNTER
Pt came in clinic stating that the antibiotic she is currently on is giving her diarrhea, wants an alternative. Please advise.

## 2019-06-26 ENCOUNTER — OFFICE VISIT (OUTPATIENT)
Dept: INTERNAL MEDICINE | Facility: CLINIC | Age: 64
End: 2019-06-26
Payer: MEDICARE

## 2019-06-26 ENCOUNTER — TELEPHONE (OUTPATIENT)
Dept: TRANSPLANT | Facility: CLINIC | Age: 64
End: 2019-06-26

## 2019-06-26 VITALS
BODY MASS INDEX: 29.7 KG/M2 | WEIGHT: 161.38 LBS | HEIGHT: 62 IN | HEART RATE: 98 BPM | SYSTOLIC BLOOD PRESSURE: 112 MMHG | DIASTOLIC BLOOD PRESSURE: 78 MMHG | OXYGEN SATURATION: 98 % | TEMPERATURE: 99 F

## 2019-06-26 DIAGNOSIS — J40 BRONCHITIS: Primary | ICD-10-CM

## 2019-06-26 DIAGNOSIS — T88.7XXA MEDICATION SIDE EFFECTS: ICD-10-CM

## 2019-06-26 PROCEDURE — 99999 PR PBB SHADOW E&M-EST. PATIENT-LVL V: ICD-10-PCS | Mod: PBBFAC,HCNC,, | Performed by: FAMILY MEDICINE

## 2019-06-26 PROCEDURE — 3074F PR MOST RECENT SYSTOLIC BLOOD PRESSURE < 130 MM HG: ICD-10-PCS | Mod: HCNC,CPTII,S$GLB, | Performed by: FAMILY MEDICINE

## 2019-06-26 PROCEDURE — 3008F PR BODY MASS INDEX (BMI) DOCUMENTED: ICD-10-PCS | Mod: HCNC,CPTII,S$GLB, | Performed by: FAMILY MEDICINE

## 2019-06-26 PROCEDURE — 3008F BODY MASS INDEX DOCD: CPT | Mod: HCNC,CPTII,S$GLB, | Performed by: FAMILY MEDICINE

## 2019-06-26 PROCEDURE — 99213 OFFICE O/P EST LOW 20 MIN: CPT | Mod: HCNC,S$GLB,, | Performed by: FAMILY MEDICINE

## 2019-06-26 PROCEDURE — 3078F DIAST BP <80 MM HG: CPT | Mod: HCNC,CPTII,S$GLB, | Performed by: FAMILY MEDICINE

## 2019-06-26 PROCEDURE — 3078F PR MOST RECENT DIASTOLIC BLOOD PRESSURE < 80 MM HG: ICD-10-PCS | Mod: HCNC,CPTII,S$GLB, | Performed by: FAMILY MEDICINE

## 2019-06-26 PROCEDURE — 99999 PR PBB SHADOW E&M-EST. PATIENT-LVL V: CPT | Mod: PBBFAC,HCNC,, | Performed by: FAMILY MEDICINE

## 2019-06-26 PROCEDURE — 3074F SYST BP LT 130 MM HG: CPT | Mod: HCNC,CPTII,S$GLB, | Performed by: FAMILY MEDICINE

## 2019-06-26 PROCEDURE — 99213 PR OFFICE/OUTPT VISIT, EST, LEVL III, 20-29 MIN: ICD-10-PCS | Mod: HCNC,S$GLB,, | Performed by: FAMILY MEDICINE

## 2019-06-26 NOTE — TELEPHONE ENCOUNTER
Called to Dr. Hunter Lopez's office rergarding getting a copy of her echocardiogram she stated that she did on 6/13/19.  Dr. Lopez's office number is [phone]. Spoke with Lavonne, who will send a copy.    Received a printed copy of Echo, pt's EF 55%.   Pt would like to be seen in [place] if possible.

## 2019-06-26 NOTE — PROGRESS NOTES
Subjective:       Patient ID: Nadia Damon is a 63 y.o. female.    Chief Complaint: Follow-up    She stopped Augmentin after 7 days due to diarrhea.  Diarrhea has stopped.  She started Keflex without side effect.  She denies any further nasal congestion cough fever chills.    Review of Systems   Constitutional: Negative for chills and fever.   HENT: Negative for congestion.    Respiratory: Negative for cough, shortness of breath and wheezing.    Gastrointestinal: Negative for diarrhea and nausea.       Objective:      Physical Exam   Constitutional: She appears well-developed and well-nourished. No distress.   HENT:   Right Ear: External ear normal.   Left Ear: External ear normal.   Nose: Nose normal.   Mouth/Throat: Oropharynx is clear and moist.   Neck: No JVD present.   Cardiovascular: Normal rate and regular rhythm.   Pulmonary/Chest: Effort normal and breath sounds normal. She has no wheezes. She has no rales.       Lab Visit on 06/10/2019   Component Date Value Ref Range Status    WBC 06/10/2019 2.84* 3.90 - 12.70 K/uL Final    RBC 06/10/2019 4.33  4.00 - 5.40 M/uL Final    Hemoglobin 06/10/2019 12.1  12.0 - 16.0 g/dL Final    Hematocrit 06/10/2019 39.3  37.0 - 48.5 % Final    Mean Corpuscular Volume 06/10/2019 91  82 - 98 fL Final    Mean Corpuscular Hemoglobin 06/10/2019 27.9  27.0 - 31.0 pg Final    Mean Corpuscular Hemoglobin Conc 06/10/2019 30.8* 32.0 - 36.0 g/dL Final    RDW 06/10/2019 15.0* 11.5 - 14.5 % Final    Platelets 06/10/2019 310  150 - 350 K/uL Final    MPV 06/10/2019 9.6  9.2 - 12.9 fL Final    Immature Granulocytes 06/10/2019 0.4  0.0 - 0.5 % Final    Gran # (ANC) 06/10/2019 1.1* 1.8 - 7.7 K/uL Final    Immature Grans (Abs) 06/10/2019 0.01  0.00 - 0.04 K/uL Final    Lymph # 06/10/2019 1.3  1.0 - 4.8 K/uL Final    Mono # 06/10/2019 0.3  0.3 - 1.0 K/uL Final    Eos # 06/10/2019 0.1  0.0 - 0.5 K/uL Final    Baso # 06/10/2019 0.02  0.00 - 0.20 K/uL Final    nRBC 06/10/2019  0  0 /100 WBC Final    Gran% 06/10/2019 39.0  38.0 - 73.0 % Final    Lymph% 06/10/2019 44.0  18.0 - 48.0 % Final    Mono% 06/10/2019 12.0  4.0 - 15.0 % Final    Eosinophil% 06/10/2019 3.9  0.0 - 8.0 % Final    Basophil% 06/10/2019 0.7  0.0 - 1.9 % Final    Differential Method 06/10/2019 Automated   Final    Sodium 06/10/2019 143  136 - 145 mmol/L Final    Potassium 06/10/2019 3.6  3.5 - 5.1 mmol/L Final    Chloride 06/10/2019 106  95 - 110 mmol/L Final    CO2 06/10/2019 24  23 - 29 mmol/L Final    Glucose 06/10/2019 105  70 - 110 mg/dL Final    BUN, Bld 06/10/2019 22  8 - 23 mg/dL Final    Creatinine 06/10/2019 1.5* 0.5 - 1.4 mg/dL Final    Calcium 06/10/2019 10.0  8.7 - 10.5 mg/dL Final    Total Protein 06/10/2019 8.8* 6.0 - 8.4 g/dL Final    Albumin 06/10/2019 3.6  3.5 - 5.2 g/dL Final    Total Bilirubin 06/10/2019 0.6  0.1 - 1.0 mg/dL Final    Alkaline Phosphatase 06/10/2019 149* 55 - 135 U/L Final    AST 06/10/2019 45* 10 - 40 U/L Final    ALT 06/10/2019 34  10 - 44 U/L Final    Anion Gap 06/10/2019 13  8 - 16 mmol/L Final    eGFR if  06/10/2019 42.1* >60 mL/min/1.73 m^2 Final    eGFR if non African American 06/10/2019 36.6* >60 mL/min/1.73 m^2 Final    HC1Q Testing Date 06/10/2019 06/13/2019 12:26 PM   Final    C1Q1 Testing Date 06/10/2019 06/13/2019 12:00 AM   Final    Serum Collection DT - [facility] Clas* 06/10/2019 06/10/2019 09:50 AM   Final    Class I Antibody Comments - [facility] 06/10/2019 NO DSA DETECTED   Final    C1Q2 Testing Date 06/10/2019 06/13/2019 12:00 AM   Final    Serum Collection DT - [facility] Clas* 06/10/2019 06/10/2019 09:50 AM   Final    Class II Antibody Comments - [facility]* 06/10/2019 NO DSA DETECTED   Final    DSA12 Testing Date 06/10/2019 06/11/2019 04:21 PM   Final    DSA1 Testing Date 06/10/2019 06/11/2019 12:00 AM   Final    Serum Collection DT - [facility] Clas* 06/10/2019 06/10/2019 09:50 AM   Final    Class I Antibody Comments - [facility]  06/10/2019 NO DSA DETECTED   Final    DSA2 Testing Date 06/10/2019 06/11/2019 12:00 AM   Final    Serum Collection DT - [facility] Clas* 06/10/2019 06/10/2019 09:50 AM   Final    Class II Antibody Comments - [facility]umin* 06/10/2019 WEAK DSA DETECTED: [id number])   Final    Magnesium 06/10/2019 1.7  1.6 - 2.6 mg/dL Final    Cyclosporine, LC/MS 06/10/2019 133  100 - 400 ng/mL Final     Assessment:       No diagnosis found.    Plan:     Continue Keflex for total of 10 days of antibiotics including Augmentin.  Follow-up is needed    There are no diagnoses linked to this encounter.

## 2019-06-27 ENCOUNTER — CLINICAL SUPPORT (OUTPATIENT)
Dept: CARDIOLOGY | Facility: CLINIC | Age: 64
End: 2019-06-27
Attending: INTERNAL MEDICINE
Payer: MEDICARE

## 2019-06-27 DIAGNOSIS — Z94.1 STATUS POST HEART TRANSPLANT: ICD-10-CM

## 2019-06-27 DIAGNOSIS — T86.20 COMPLICATION OF HEART TRANSPLANT, UNSPECIFIED COMPLICATION: ICD-10-CM

## 2019-06-27 DIAGNOSIS — Z79.899 ENCOUNTER FOR LONG-TERM (CURRENT) USE OF MEDICATIONS: ICD-10-CM

## 2019-06-27 DIAGNOSIS — E78.2 MIXED HYPERLIPIDEMIA: ICD-10-CM

## 2019-06-27 DIAGNOSIS — I10 ESSENTIAL HYPERTENSION: ICD-10-CM

## 2019-06-27 PROCEDURE — 93351 STRESS TTE COMPLETE: CPT | Mod: HCNC,S$GLB,, | Performed by: INTERNAL MEDICINE

## 2019-06-27 PROCEDURE — 93325 DOPPLER ECHO COLOR FLOW MAPG: CPT | Mod: HCNC,S$GLB,, | Performed by: INTERNAL MEDICINE

## 2019-06-27 PROCEDURE — 93351 DOBUTAMINE STRESS TEST W/ COLOR FLOW: ICD-10-PCS | Mod: HCNC,S$GLB,, | Performed by: INTERNAL MEDICINE

## 2019-06-27 PROCEDURE — 93320 DOBUTAMINE STRESS TEST W/ COLOR FLOW: ICD-10-PCS | Mod: HCNC,S$GLB,, | Performed by: INTERNAL MEDICINE

## 2019-06-27 PROCEDURE — 93325 DOBUTAMINE STRESS TEST W/ COLOR FLOW: ICD-10-PCS | Mod: HCNC,S$GLB,, | Performed by: INTERNAL MEDICINE

## 2019-06-27 PROCEDURE — 93320 DOPPLER ECHO COMPLETE: CPT | Mod: HCNC,S$GLB,, | Performed by: INTERNAL MEDICINE

## 2019-06-28 LAB
DIASTOLIC DYSFUNCTION: NO
ESTIMATED PA SYSTOLIC PRESSURE: 28
MITRAL VALVE REGURGITATION: NORMAL
RETIRED EF AND QEF - SEE NOTES: 60 (ref 55–65)
TRICUSPID VALVE REGURGITATION: NORMAL

## 2019-07-03 ENCOUNTER — TELEPHONE (OUTPATIENT)
Dept: TRANSPLANT | Facility: CLINIC | Age: 64
End: 2019-07-03

## 2019-07-22 ENCOUNTER — OFFICE VISIT (OUTPATIENT)
Dept: INTERNAL MEDICINE | Facility: CLINIC | Age: 64
End: 2019-07-22
Payer: MEDICARE

## 2019-07-22 VITALS
OXYGEN SATURATION: 98 % | HEART RATE: 95 BPM | HEIGHT: 62 IN | BODY MASS INDEX: 31.16 KG/M2 | DIASTOLIC BLOOD PRESSURE: 82 MMHG | SYSTOLIC BLOOD PRESSURE: 124 MMHG | WEIGHT: 169.31 LBS

## 2019-07-22 DIAGNOSIS — Z94.1 HEART TRANSPLANTED: Chronic | ICD-10-CM

## 2019-07-22 DIAGNOSIS — Z00.00 ENCOUNTER FOR PREVENTIVE HEALTH EXAMINATION: Primary | ICD-10-CM

## 2019-07-22 DIAGNOSIS — F41.9 ANXIETY: Chronic | ICD-10-CM

## 2019-07-22 DIAGNOSIS — F32.9 CHRONIC MAJOR DEPRESSIVE DISORDER: ICD-10-CM

## 2019-07-22 DIAGNOSIS — E66.9 OBESITY (BMI 30.0-34.9): ICD-10-CM

## 2019-07-22 DIAGNOSIS — M79.7 FIBROMYALGIA: ICD-10-CM

## 2019-07-22 DIAGNOSIS — D84.9 IMMUNOCOMPROMISED PATIENT: ICD-10-CM

## 2019-07-22 DIAGNOSIS — N18.30 CKD (CHRONIC KIDNEY DISEASE) STAGE 3, GFR 30-59 ML/MIN: ICD-10-CM

## 2019-07-22 DIAGNOSIS — E21.3 HYPERPARATHYROIDISM: ICD-10-CM

## 2019-07-22 DIAGNOSIS — I10 ESSENTIAL HYPERTENSION: ICD-10-CM

## 2019-07-22 DIAGNOSIS — Z91.89 AT HIGH RISK FOR BREAST CANCER: ICD-10-CM

## 2019-07-22 DIAGNOSIS — I70.0 AORTIC ATHEROSCLEROSIS: ICD-10-CM

## 2019-07-22 PROBLEM — E66.811 OBESITY (BMI 30.0-34.9): Status: ACTIVE | Noted: 2019-07-22

## 2019-07-22 PROCEDURE — 99499 UNLISTED E&M SERVICE: CPT | Mod: HCNC,S$GLB,, | Performed by: NURSE PRACTITIONER

## 2019-07-22 PROCEDURE — 99499 RISK ADDL DX/OHS AUDIT: ICD-10-PCS | Mod: HCNC,S$GLB,, | Performed by: NURSE PRACTITIONER

## 2019-07-22 PROCEDURE — 99999 PR PBB SHADOW E&M-EST. PATIENT-LVL V: CPT | Mod: PBBFAC,HCNC,, | Performed by: NURSE PRACTITIONER

## 2019-07-22 PROCEDURE — 99999 PR PBB SHADOW E&M-EST. PATIENT-LVL V: ICD-10-PCS | Mod: PBBFAC,HCNC,, | Performed by: NURSE PRACTITIONER

## 2019-07-22 NOTE — PROGRESS NOTES
"Nadia Damon presented for a  Medicare AWV and comprehensive Health Risk Assessment today. The following components were reviewed and updated:    · Medical history  · Family History  · Social history  · Allergies and Current Medications  · Health Risk Assessment  · Health Maintenance  · Care Team     ** See Completed Assessments for Annual Wellness Visit within the encounter summary.**       The following assessments were completed:  · Living Situation  · CAGE  · Depression Screening  · Timed Get Up and Go  · Whisper Test  · Cognitive Function Screening  · Nutrition Screening  · ADL Screening  · PAQ Screening    Vitals:    07/22/19 1322 07/22/19 1440   BP: (!) 132/92 124/82   BP Location: Left arm Left arm   Patient Position: Sitting Sitting   BP Method: Medium (Manual) Medium (Manual)   Pulse: 95    SpO2: 98%    Weight: 76.8 kg (169 lb 5 oz)    Height: 5' 2" (1.575 m)      Body mass index is 30.97 kg/m².  Physical Exam   Constitutional: She is oriented to person, place, and time. She appears well-developed and well-nourished.   HENT:   Head: Normocephalic.   Cardiovascular: Normal rate, regular rhythm and normal heart sounds.   No murmur heard.  Pulmonary/Chest: Effort normal and breath sounds normal. No respiratory distress.   Abdominal: Soft. She exhibits no mass. There is no tenderness.   Musculoskeletal: Normal range of motion.   1+ edema noted to bilateral lower extremities.    Neurological: She is alert and oriented to person, place, and time. She exhibits normal muscle tone.   Skin: Skin is warm, dry and intact.   Psychiatric: She has a normal mood and affect. Her speech is normal and behavior is normal.   Nursing note and vitals reviewed.        Diagnoses and health risks identified today and associated recommendations/orders:    1. Encounter for preventive health examination  She will discuss scheduling DEXA with PCP at upcoming appointment.   She will discuss vaccines with transplant team.     2. " Essential hypertension  BP elevated at today's visit. She reported to MA she had not yet taken BP medication. MA reports she took while she was in the room. MA recheck at end of visit WNL. Advised patient to monitor BP (keep a log) and if continues to stay elevated (greater than 140/90) to follow up with PCP/cardiology for further evaluation and treatment. She will check blood pressure once she gets home and notify if not trending down. She will bring in blood pressure log and machine for correlation to upcoming PCP appointment. Reports she has noticed some lower extremity edema in the last couple of weeks. Reports she has been on her feet a lot in that time frame, as well. Discussed s/s of heart failure (patient denies any s/s) and advised to go to the ER if occur. She expressed understanding. She will follow up with PCP and cardiologist. She expressed understanding and is in agreement with plan of care.     3. Aortic atherosclerosis  CXR 8/8/2016  Discussed diagnosis and risk reduction.   Stable and controlled. Continue current treatment plan as previously prescribed with your PCP.    4. Heart transplanted  5/1993  Continue current treatment plan as previously prescribed with your PCP, transplant team, and outside cardiologist.     5. Immunocompromised patient  See #4    6. Chronic major depressive disorder  PHQ 2-0. Denies SI.  Discussed the importance of not abruptly stopping medication to first consult with PCP. She expressed understanding.    Stable and controlled. Continue current treatment plan as previously prescribed with your PCP.     7. Anxiety  Continue current treatment plan as previously prescribed with your PCP.     8. At high risk for breast cancer  Continue current treatment plan as previously prescribed with Dr. Soni.    9. CKD (chronic kidney disease) stage 3, GFR 30-59 ml/min  Advised to avoid NSAIDs.   Stable. Continue current treatment plan as previously prescribed with your PCP and  nephrologist.     10. Hyperparathyroidism  PTH increased from prior check. Continue current treatment plan as previously prescribed with your nephrologist.     11. Fibromyalgia  Continue current treatment plan as previously prescribed with your PCP.     12. Obesity (BMI 30.0-34.9)  Encouraged healthy diet and exercise as tolerated to help bring BMI into normal range.   Advised to follow up with PCP for further monitoring. She expressed understanding.      Reports she is s/p toe surgery and still under the care of podiatrist.     Provided Nadia with a 5-10 year written screening schedule and personal prevention plan.   Education, counseling, and referrals were provided as needed. After Visit Summary printed and given to patient which includes a list of additional screenings\tests needed.    Follow up in about 1 year (around 7/22/2020) for AWV.    Abbie Huerta NP  I offered to discuss end of life issues, including information on how to make advance directives that the patient could use to name someone who would make medical decisions on their behalf if they became too ill to make themselves.    ___Patient declined  _X_Patient is interested, I provided paper work and offered to discuss.

## 2019-07-22 NOTE — PATIENT INSTRUCTIONS
Counseling and Referral of Other Preventative  (Italic type indicates deductible and co-insurance are waived)    Patient Name: Nadia Damon  Today's Date: 7/22/2019    Health Maintenance       Date Due Completion Date    TETANUS VACCINE 07/16/1972 ---    DEXA SCAN 07/16/1994 ---    Shingles Vaccine (1 of 2) 07/16/2004 ---    Influenza Vaccine 08/01/2019 10/22/2018    Override on 10/16/2017: Done    Override on 10/12/2016: Done    Override on 11/2/2015: Done (approx date elsewhere)    Mammogram 01/11/2020 1/11/2019    Override on 3/27/2018: Done    Override on 2/24/2016: Done (folow up at [facility])    Lipid Panel 02/13/2020 2/13/2019    Pneumococcal Vaccine (65+ High/Highest Risk) (2 of 2 - PPSV23) 10/22/2023 10/22/2018    Colonoscopy 02/03/2024 2/3/2014 (Done)    Override on 2/3/2014: Done (had brbpr  approx date  at [facility])        No orders of the defined types were placed in this encounter.    The following information is provided to all patients.  This information is to help you find resources for any of the problems found today that may be affecting your health:                Living healthy guide: www.[facility].[place].gov      Understanding Diabetes: www.diabetes.org      Eating healthy: www.cdc.gov/healthyweight      CDC home safety checklist: www.cdc.gov/steadi/patient.html      Agency on Aging: www.goea.[place].gov      Alcoholics anonymous (AA): www.aa.org      Physical Activity: www.thea.nih.gov/zm1bihk      Tobacco use: www.quitwithusla.org

## 2019-07-29 ENCOUNTER — OFFICE VISIT (OUTPATIENT)
Dept: INTERNAL MEDICINE | Facility: CLINIC | Age: 64
End: 2019-07-29
Payer: MEDICARE

## 2019-07-29 VITALS
HEART RATE: 91 BPM | HEIGHT: 62 IN | DIASTOLIC BLOOD PRESSURE: 70 MMHG | SYSTOLIC BLOOD PRESSURE: 110 MMHG | TEMPERATURE: 98 F | WEIGHT: 165.44 LBS | OXYGEN SATURATION: 98 % | BODY MASS INDEX: 30.44 KG/M2

## 2019-07-29 DIAGNOSIS — N18.30 CKD (CHRONIC KIDNEY DISEASE) STAGE 3, GFR 30-59 ML/MIN: ICD-10-CM

## 2019-07-29 DIAGNOSIS — Z78.0 ASYMPTOMATIC MENOPAUSAL STATE: ICD-10-CM

## 2019-07-29 DIAGNOSIS — I95.9 HYPOTENSION, UNSPECIFIED HYPOTENSION TYPE: ICD-10-CM

## 2019-07-29 DIAGNOSIS — R60.9 EDEMA, UNSPECIFIED TYPE: ICD-10-CM

## 2019-07-29 DIAGNOSIS — Z13.820 OSTEOPOROSIS SCREENING: ICD-10-CM

## 2019-07-29 DIAGNOSIS — Z94.1 HEART TRANSPLANTED: ICD-10-CM

## 2019-07-29 DIAGNOSIS — I10 ESSENTIAL HYPERTENSION: Primary | ICD-10-CM

## 2019-07-29 PROCEDURE — 3078F PR MOST RECENT DIASTOLIC BLOOD PRESSURE < 80 MM HG: ICD-10-PCS | Mod: HCNC,CPTII,S$GLB, | Performed by: FAMILY MEDICINE

## 2019-07-29 PROCEDURE — 99999 PR PBB SHADOW E&M-EST. PATIENT-LVL V: ICD-10-PCS | Mod: PBBFAC,HCNC,, | Performed by: FAMILY MEDICINE

## 2019-07-29 PROCEDURE — 99214 PR OFFICE/OUTPT VISIT, EST, LEVL IV, 30-39 MIN: ICD-10-PCS | Mod: HCNC,S$GLB,, | Performed by: FAMILY MEDICINE

## 2019-07-29 PROCEDURE — 3008F BODY MASS INDEX DOCD: CPT | Mod: HCNC,CPTII,S$GLB, | Performed by: FAMILY MEDICINE

## 2019-07-29 PROCEDURE — 3074F PR MOST RECENT SYSTOLIC BLOOD PRESSURE < 130 MM HG: ICD-10-PCS | Mod: HCNC,CPTII,S$GLB, | Performed by: FAMILY MEDICINE

## 2019-07-29 PROCEDURE — 1101F PT FALLS ASSESS-DOCD LE1/YR: CPT | Mod: HCNC,CPTII,S$GLB, | Performed by: FAMILY MEDICINE

## 2019-07-29 PROCEDURE — 1101F PR PT FALLS ASSESS DOC 0-1 FALLS W/OUT INJ PAST YR: ICD-10-PCS | Mod: HCNC,CPTII,S$GLB, | Performed by: FAMILY MEDICINE

## 2019-07-29 PROCEDURE — 3078F DIAST BP <80 MM HG: CPT | Mod: HCNC,CPTII,S$GLB, | Performed by: FAMILY MEDICINE

## 2019-07-29 PROCEDURE — 3008F PR BODY MASS INDEX (BMI) DOCUMENTED: ICD-10-PCS | Mod: HCNC,CPTII,S$GLB, | Performed by: FAMILY MEDICINE

## 2019-07-29 PROCEDURE — 3074F SYST BP LT 130 MM HG: CPT | Mod: HCNC,CPTII,S$GLB, | Performed by: FAMILY MEDICINE

## 2019-07-29 PROCEDURE — 99999 PR PBB SHADOW E&M-EST. PATIENT-LVL V: CPT | Mod: PBBFAC,HCNC,, | Performed by: FAMILY MEDICINE

## 2019-07-29 PROCEDURE — 99214 OFFICE O/P EST MOD 30 MIN: CPT | Mod: HCNC,S$GLB,, | Performed by: FAMILY MEDICINE

## 2019-07-29 NOTE — PROGRESS NOTES
Subjective:       Patient ID: Nadia Damon is a 64 y.o. female.    Chief Complaint: Follow-up (HRA- 3 month follow up)    She is status post heart transplant with chronic kidney disease  stage 3 hypertension with episode of low blood pressure.  She reports some recent swelling of her ankles.  She has  slow healing of her foot status post surgery.  She is concerned about osteoporosis.  She has not had a DEXA scan.  She denies headache chest pain palpitations shortness of breath.  Her walking has been limited due to recent foot surgery.  She is a low-sodium diet.  She is not currently on any diuretics.    Review of Systems   Constitutional: Negative for appetite change, chills, diaphoresis, fatigue, fever and unexpected weight change.   Respiratory: Negative for cough, chest tightness, shortness of breath and wheezing.    Cardiovascular: Positive for leg swelling. Negative for chest pain and palpitations.        Denies lightheadedness   Gastrointestinal: Negative for abdominal distention and abdominal pain.   Endocrine: Negative for polydipsia and polyuria.   Genitourinary: Negative for difficulty urinating.   Neurological: Negative for dizziness, weakness, light-headedness and headaches.       Objective:      Physical Exam   Constitutional: She is oriented to person, place, and time. She appears well-developed and well-nourished. No distress.   Neck: Neck supple. No JVD present. No thyromegaly present.   Cardiovascular: Normal rate and regular rhythm.   No murmur heard.  Trace edema of ankles  3rd heart sound is present   Pulmonary/Chest: Effort normal and breath sounds normal. No respiratory distress. She has no wheezes.   Abdominal: Soft. Bowel sounds are normal. She exhibits no mass. There is no tenderness.   Lymphadenopathy:     She has no cervical adenopathy.   Neurological: She is alert and oriented to person, place, and time.   Skin: She is not diaphoretic.       Clinical Support on 06/27/2019   Component  Date Value Ref Range Status    QEF 06/27/2019 60  55 - 65 Final    Mitral Valve Regurgitation 06/27/2019 TRIVIAL   Final    Diastolic Dysfunction 06/27/2019 No   Final    Est. PA Systolic Pressure 06/27/2019 28   Final    Tricuspid Valve Regurgitation 06/27/2019 MILD   Final     Assessment:       1. Osteoporosis screening    2. Asymptomatic menopausal state         Plan:     Blood pressure 110/70.  Up-to-date CMP reviewed.  Medications reviewed.  DEXA scan ordered.  Regards swelling her ankles recommend elevation increased walking avoid sodium.  Follow-up in 3 months.    Osteoporosis screening  -     DXA Bone Density Spine And Hip; Future; Expected date: 07/29/2019    Asymptomatic menopausal state   -     DXA Bone Density Spine And Hip; Future; Expected date: 07/29/2019

## 2019-07-30 ENCOUNTER — APPOINTMENT (OUTPATIENT)
Dept: RADIOLOGY | Facility: HOSPITAL | Age: 64
End: 2019-07-30
Attending: FAMILY MEDICINE
Payer: MEDICARE

## 2019-07-30 DIAGNOSIS — Z13.820 OSTEOPOROSIS SCREENING: ICD-10-CM

## 2019-07-30 DIAGNOSIS — Z78.0 ASYMPTOMATIC MENOPAUSAL STATE: ICD-10-CM

## 2019-07-30 PROCEDURE — 77080 DEXA BONE DENSITY SPINE HIP: ICD-10-PCS | Mod: 26,HCNC,, | Performed by: RADIOLOGY

## 2019-07-30 PROCEDURE — 77080 DXA BONE DENSITY AXIAL: CPT | Mod: TC,HCNC

## 2019-07-30 PROCEDURE — 77080 DXA BONE DENSITY AXIAL: CPT | Mod: 26,HCNC,, | Performed by: RADIOLOGY

## 2019-08-01 ENCOUNTER — TELEPHONE (OUTPATIENT)
Dept: INTERNAL MEDICINE | Facility: CLINIC | Age: 64
End: 2019-08-01

## 2019-08-01 NOTE — TELEPHONE ENCOUNTER
----- Message from Henrik Maxwell sent at 8/1/2019  2:46 PM CDT -----  Contact: self  Type:  Patient Returning Call    Who Called:pt  Who Left Message for Patient:n/a  Does the patient know what this is regarding?:bone density scan results  Would the patient rather a call back or a response via [facility]? Call back  Best Call Back Number:[phone]  Additional Information: none    Thanks,  Henrik Maxwell

## 2019-08-01 NOTE — PROGRESS NOTES
She is able to eat vegetables, drink milk, minimize salt intake . are all ok for kidneys. She can eat protein too but not more than 60g of protein per day

## 2019-08-14 DIAGNOSIS — F51.01 PRIMARY INSOMNIA: ICD-10-CM

## 2019-08-15 RX ORDER — TEMAZEPAM 30 MG/1
CAPSULE ORAL
Qty: 30 CAPSULE | Refills: 2 | Status: SHIPPED | OUTPATIENT
Start: 2019-08-15 | End: 2019-09-26 | Stop reason: DRUGHIGH

## 2019-08-22 ENCOUNTER — LAB VISIT (OUTPATIENT)
Dept: LAB | Facility: HOSPITAL | Age: 64
End: 2019-08-22
Attending: INTERNAL MEDICINE
Payer: MEDICARE

## 2019-08-22 DIAGNOSIS — N20.0 NEPHROLITHIASIS: ICD-10-CM

## 2019-08-22 LAB
ALBUMIN SERPL BCP-MCNC: 3.3 G/DL (ref 3.5–5.2)
ANION GAP SERPL CALC-SCNC: 10 MMOL/L (ref 8–16)
BUN SERPL-MCNC: 31 MG/DL (ref 8–23)
CALCIUM SERPL-MCNC: 10 MG/DL (ref 8.7–10.5)
CHLORIDE SERPL-SCNC: 108 MMOL/L (ref 95–110)
CO2 SERPL-SCNC: 26 MMOL/L (ref 23–29)
CREAT SERPL-MCNC: 1.7 MG/DL (ref 0.5–1.4)
EST. GFR  (AFRICAN AMERICAN): 36 ML/MIN/1.73 M^2
EST. GFR  (NON AFRICAN AMERICAN): 31.2 ML/MIN/1.73 M^2
GLUCOSE SERPL-MCNC: 139 MG/DL (ref 70–110)
PHOSPHATE SERPL-MCNC: 4.1 MG/DL (ref 2.7–4.5)
POTASSIUM SERPL-SCNC: 5.1 MMOL/L (ref 3.5–5.1)
SODIUM SERPL-SCNC: 144 MMOL/L (ref 136–145)

## 2019-08-22 PROCEDURE — 80069 RENAL FUNCTION PANEL: CPT | Mod: HCNC

## 2019-08-22 PROCEDURE — 36415 COLL VENOUS BLD VENIPUNCTURE: CPT | Mod: HCNC

## 2019-08-29 ENCOUNTER — OFFICE VISIT (OUTPATIENT)
Dept: NEPHROLOGY | Facility: CLINIC | Age: 64
End: 2019-08-29
Payer: MEDICARE

## 2019-08-29 VITALS
HEART RATE: 100 BPM | RESPIRATION RATE: 20 BRPM | BODY MASS INDEX: 29.53 KG/M2 | DIASTOLIC BLOOD PRESSURE: 90 MMHG | SYSTOLIC BLOOD PRESSURE: 130 MMHG | HEIGHT: 62 IN | WEIGHT: 160.5 LBS

## 2019-08-29 DIAGNOSIS — N18.30 STAGE 3 CHRONIC KIDNEY DISEASE: ICD-10-CM

## 2019-08-29 DIAGNOSIS — N20.0 NEPHROLITHIASIS: ICD-10-CM

## 2019-08-29 DIAGNOSIS — E79.0 HYPERURICEMIA: ICD-10-CM

## 2019-08-29 DIAGNOSIS — I10 ESSENTIAL HYPERTENSION: ICD-10-CM

## 2019-08-29 DIAGNOSIS — E83.52 HYPERCALCEMIA: Primary | ICD-10-CM

## 2019-08-29 DIAGNOSIS — E21.0 PRIMARY HYPERPARATHYROIDISM: ICD-10-CM

## 2019-08-29 DIAGNOSIS — T86.19 NEPHROTOXICITY DUE TO CALCINEURIN INHIBITOR THERAPY OF TRANSPLANTED KIDNEY: ICD-10-CM

## 2019-08-29 DIAGNOSIS — Z71.89 ENCOUNTER FOR MEDICATION REVIEW AND COUNSELING: ICD-10-CM

## 2019-08-29 DIAGNOSIS — T45.1X5A NEPHROTOXICITY DUE TO CALCINEURIN INHIBITOR THERAPY OF TRANSPLANTED KIDNEY: ICD-10-CM

## 2019-08-29 DIAGNOSIS — N14.4 NEPHROTOXICITY DUE TO CALCINEURIN INHIBITOR THERAPY OF TRANSPLANTED KIDNEY: ICD-10-CM

## 2019-08-29 PROCEDURE — 3008F PR BODY MASS INDEX (BMI) DOCUMENTED: ICD-10-PCS | Mod: HCNC,CPTII,S$GLB, | Performed by: INTERNAL MEDICINE

## 2019-08-29 PROCEDURE — 3080F DIAST BP >= 90 MM HG: CPT | Mod: HCNC,CPTII,S$GLB, | Performed by: INTERNAL MEDICINE

## 2019-08-29 PROCEDURE — 3075F PR MOST RECENT SYSTOLIC BLOOD PRESS GE 130-139MM HG: ICD-10-PCS | Mod: HCNC,CPTII,S$GLB, | Performed by: INTERNAL MEDICINE

## 2019-08-29 PROCEDURE — 3008F BODY MASS INDEX DOCD: CPT | Mod: HCNC,CPTII,S$GLB, | Performed by: INTERNAL MEDICINE

## 2019-08-29 PROCEDURE — 1101F PR PT FALLS ASSESS DOC 0-1 FALLS W/OUT INJ PAST YR: ICD-10-PCS | Mod: HCNC,CPTII,S$GLB, | Performed by: INTERNAL MEDICINE

## 2019-08-29 PROCEDURE — 3080F PR MOST RECENT DIASTOLIC BLOOD PRESSURE >= 90 MM HG: ICD-10-PCS | Mod: HCNC,CPTII,S$GLB, | Performed by: INTERNAL MEDICINE

## 2019-08-29 PROCEDURE — 1101F PT FALLS ASSESS-DOCD LE1/YR: CPT | Mod: HCNC,CPTII,S$GLB, | Performed by: INTERNAL MEDICINE

## 2019-08-29 PROCEDURE — 99999 PR PBB SHADOW E&M-EST. PATIENT-LVL III: CPT | Mod: PBBFAC,HCNC,, | Performed by: INTERNAL MEDICINE

## 2019-08-29 PROCEDURE — 99215 OFFICE O/P EST HI 40 MIN: CPT | Mod: HCNC,S$GLB,, | Performed by: INTERNAL MEDICINE

## 2019-08-29 PROCEDURE — 99999 PR PBB SHADOW E&M-EST. PATIENT-LVL III: ICD-10-PCS | Mod: PBBFAC,HCNC,, | Performed by: INTERNAL MEDICINE

## 2019-08-29 PROCEDURE — 99215 PR OFFICE/OUTPT VISIT, EST, LEVL V, 40-54 MIN: ICD-10-PCS | Mod: HCNC,S$GLB,, | Performed by: INTERNAL MEDICINE

## 2019-08-29 PROCEDURE — 3075F SYST BP GE 130 - 139MM HG: CPT | Mod: HCNC,CPTII,S$GLB, | Performed by: INTERNAL MEDICINE

## 2019-08-29 NOTE — PROGRESS NOTES
NEPHROLOGY CLINIC FOLLOWUP NOTE  Date of clinic visit: 8/29/19     REASON FOR FOLLOWUP AND CHIEF COMPLAINT: nephrolithiasis and history of chronic kidney disease post-heart transplant.     HISTORY OF PRESENT ILLNESS: Ms. Damon is a 64-year-old AA female with a history of CKD stage 3, nephrolithiasis (likely mixed stones), and heart transplant in 1993 (is on cyclosporine), who presents for f/u. Pt was last seen by us 3 months ago. Pt reports no new stones. No discomfort today, no back pain, no fever. Pt has followed medical advice regarding avoiding foods that are risk factor for kidney stones. HCTZ was stopped due to low BP. To review, pt has also primary hyperparathyroidism (HPT). She has the latter and other risk factors for kidney stones, including being on cyclosporine for prevention of heart transplant rejection, which causes hyperuricemia. No new issues since last visit, sestamibi nuclear scan of the parathyroid glands was ordered last visit, but was not done. Pt was recently diagnosed with osteopenia.        To review:   Pt previously developed right sided back pain, thought to be related to kidney stones identified on renal u/s. The stones in the R kidney were non-obstructive but were relatively large at 10 and 12 mm each. Based on h/o of taking cyclosporine to prevent heart transplant rejection and eating a lot of sea food (shrimp and crawfish), uric acid stones were suspected. Pt had additional risk factors for non-uric acid stones, including taking Vit C 1 g/day and Vit D. After dietary modification, the flank pain resolved. A f/u CT scan did not show any stones. It is possible that she passed the 2 stones, as uric acid stones are soluble on urinary alkalinization and can pass by themselves. Pt was advised to stop Vit C and Vit D, which she has. She was also advised on drinking freshly squeezed lemon juice and follow dietary recommendations to avoid nephrolithiasis risk factors. Pt was referred also to  urology. A repeat CT scan did not show any stones. Pt obtained a 24 hour urine collection for kidney stone risk stratification which showed mild hypocitraturia.         To review:  Pt has CKD       PAST MEDICAL HISTORY:  1. CKD stage III. 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 Hypertension.  3. Heart transplant for cardiomyopathy in 1993, the patient has been on chronic  cyclosporine treatment.  4. Carpal tunnel syndrome.  5. Fibromyalgia.  6. GERD.  7. Bell's palsy.  8. Depression.     PAST SURGICAL HISTORY: Reviewed as above, unchanged.     MEDICATIONS: Reviewed     Current Outpatient Medications:     acetaminophen 500 mg/15 mL Liqd, , Disp: , Rfl:     ALPRAZolam (XANAX) 0.5 MG tablet, TAKE 1 TABLET (0.5 MG TOTAL) THREE TIMES DAILY AS NEEDED FOR ANXIETY, Disp: 90 tablet, Rfl: 0    amlodipine (NORVASC) 5 MG tablet, Take 5 mg by mouth once daily., Disp: , Rfl:     ammonium lactate (AMLACTIN) 12 % lotion, Use daily.  Apply to damp skin after bathing., Disp: 225 g, Rfl: 11    aspirin (ECOTRIN) 81 MG EC tablet, Take 1 tablet (81 mg total) by mouth once daily., Disp: 30 tablet, Rfl: 11    baclofen (LIORESAL) 10 MG tablet, TAKE 1 TABLET THREE  TIMES DAILY AS NEEDED FOR MUSCLE SPASM(S)., Disp: 270 tablet, Rfl: 1    benzocaine-menthol 15-2.6 mg Lozg, , Disp: , Rfl:     biotin 10,000 mcg Cap, Take 1 tablet by mouth once daily., Disp: , Rfl:     busPIRone (BUSPAR) 10 MG tablet, TAKE 1 TABLET EVERY DAY, Disp: 90 tablet, Rfl: 1    clobetasol (TEMOVATE) 0.05 % external solution, Apply topically 2 (two) times daily. Apply to the scalp., Disp: 50 mL, Rfl: 2    cycloSPORINE modified, NEORAL, (NEORAL) 100 MG capsule, TAKE 1 CAPSULE EVERY DAY, Disp: 90 capsule, Rfl: 3    cycloSPORINE modified, NEORAL, (NEORAL) 25 MG capsule, TAKE 3 CAPSULES ONCE A DAY, Disp: 270 capsule, Rfl: 3    DULoxetine (CYMBALTA) 30 MG capsule, TAKE 1 CAPSULE EVERY DAY, Disp:  90 capsule, Rfl: 1    estradiol (ESTRACE) 0.01 % (0.1 mg/gram) vaginal cream, Place 1 g vaginally twice a week., Disp: 1 Tube, Rfl: 12    EVENING PRIMROSE OIL ORAL, Take 1,000 mg by mouth once daily., Disp: , Rfl:     ferrous sulfate (FEOSOL) 325 mg (65 mg iron) Tab tablet, Take 1 tablet (325 mg total) by mouth once daily., Disp: 100 tablet, Rfl: 3    fluticasone (FLONASE) 50 mcg/actuation nasal spray, 2 sprays by Each Nare route once daily. (Patient taking differently: 2 sprays by Each Nare route daily as needed. ), Disp: 1 Bottle, Rfl: 0    ketoconazole (NIZORAL) 2 % shampoo, Apply topically 3 (three) times a week., Disp: 120 mL, Rfl: 5    leg brace (ANKLE SUPPORT MISC), , Disp: , Rfl:     LYRICA 50 mg capsule, TAKE 1 CAPSULE BY MOUTH TWICE A DAY, Disp: 60 capsule, Rfl: 1    metoprolol succinate (TOPROL-XL) 25 MG 24 hr tablet, TAKE 1 TABLET EVERY DAY, Disp: 90 tablet, Rfl: 3    mometasone (ELOCON) 0.1 % lotion, Apply topically once daily., Disp: 180 mL, Rfl: 3    multivitamin capsule, Take 1 capsule by mouth once daily., Disp: , Rfl:     omeprazole (PRILOSEC) 10 MG capsule, TAKE 1 CAPSULE EVERY DAY, Disp: 90 capsule, Rfl: 3    temazepam (RESTORIL) 30 mg capsule, TAKE 1 CAPSULE BY MOUTH NIGHTLY **1 MONTH RESTRICTION**, Disp: 30 capsule, Rfl: 2    vitamin E 400 UNIT capsule, Take 400 Units by mouth once daily., Disp: , Rfl:     zolpidem (AMBIEN) 10 mg Tab, TAKE ONE TABLET BY MOUTH EVERY EVENING., Disp: 30 tablet, Rfl: 2    Current Facility-Administered Medications:     triamcinolone acetonide injection 10 mg, 10 mg, Intradermal, 1 time in Clinic/HOD, Jose Roland MD     SOCIAL HISTORY: Reviewed. Negative for smoking. No alcohol use.      REVIEW OF SYSTEMS: No recent hospitalizations.  GENERAL: Negative.  HEAD, EYES, EARS, NOSE, AND THROAT: Negative.  CARDIAC: Negative.  PULMONARY: Negative.  GASTROINTESTINAL: Negative.  GENITOURINARY: Negative.  PSYCHOLOGICAL: Negative.  NEUROLOGICAL:  Negative.  ENDOCRINE: Negative.  HEMATOLOGIC AND ONCOLOGIC: Negative.  INFECTIOUS DISEASE: Negative.  The rest of the review of systems negative.     PHYSICAL EXAMINATION:  VITAL SIGNS: Repeat blood pressure is 130/90. Pulse is 100, weight is 72.8 Kg, last visit 70.9 Kg  GENERAL: She is cooperative, pleasant, in no acute distress.   Speech and thought process appropriate, normal.  HEENT: Mucous membranes moist.  NECK: Neck JVD. Normal hearing.  ABDOMEN: Soft, nontender.  Side and back: mid back, non tender, no sign of trauma  EXTREMITIES: no edema.     LABS: Reviewed. No recent labs.  BMP  Lab Results   Component Value Date     08/22/2019    K 5.1 08/22/2019     08/22/2019    CO2 26 08/22/2019    BUN 31 (H) 08/22/2019    CREATININE 1.7 (H) 08/22/2019    CALCIUM 10.0 08/22/2019    ANIONGAP 10 08/22/2019    ESTGFRAFRICA 36.0 (A) 08/22/2019    EGFRNONAA 31.2 (A) 08/22/2019     Lab Results   Component Value Date    WBC 2.84 (L) 06/10/2019    HGB 12.1 06/10/2019    HCT 39.3 06/10/2019    MCV 91 06/10/2019     06/10/2019     Lab Results   Component Value Date    .0 (H) 05/01/2019    CALCIUM 10.0 08/22/2019    CAION 1.13 09/05/2018    PHOS 4.1 08/22/2019        To review:   24 hour urine: Ca not measured, uric acid 138, Oxalate 20, citrate 203  U/a unremarkable  Urine Cx: neg   Urine P/Cr 740 mg     KUB: unremarkable, except for some constipation     Renal u/s: FINDINGS:  Right kidney: The right kidney measures 9.6 cm. Mild cortical thinning and increased cortical echogenicity.  10 and 12 mm stones are seen in the upper and lower poles respectively.  No cortical thinning. No loss of corticomedullary distinction. No mass. No renal stone. No hydronephrosis.  Left kidney: The left kidney measures 9.3 cm. Mild cortical thinning and increased cortical echogenicity. No loss of corticomedullary distinction. No mass. No renal stone. No hydronephrosis.  The bladder is partially distended at the time of  scanning and has an unremarkable appearance.     CT abd: The left kidney appears slightly small.  Right kidney is grossly normal in size.  No obvious hydronephrosis or nephrolithiasis           ASSESSMENT AND PLAN: This is a 64-year-old female who presents for follow up of nephrolithiasis and CKD   Patient has a h/o of heart transplant  The impression is as follows:     1. Nephrolithiasis: has multiple risk factors for kidney stones: (hyperuricemia from cyclosporine, diet rich in uric acid, previously taking Vit C, vit D, and calcium,, CKD, and having hyperparathyroidism)  Stones are likely mixed ca oxalate and uric acid kidney stones  Discussed with pt again today  No longer taking Vit C  No longer takes Vit D and Ca. But this question needs to be re-visited, as pt now is said to have osteopenia. Will refer pt to rheumatology.  Mild hyperuricemia, from cyclosporine.     No new stones  F/u CT did not show any stones  U/s earlier showed non-obstructive 2 R stones, relatively large  May have passed the stones     24 hour urine for kidney stone stratification shows hypocitraturia  Pt was advised NOT TO STOP cyclosporine.     Pt was again advised of dietary risk factors for kidney stones, was given printed literature  Advised pt to:  -keep water intake to >2 L/day  -keep salt in diet to <3 g/day  -reduce meat intake, specially seafood  -keep Ca intake average  -NoVit C  -No Vit D? Now has osteopenia.     2. Renal. 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 HTN: BP controlled  Reviewed meds with pt  Please note that HCTZ can be used for stone prevention (is hypocalciuric), HCTZ was stopped for low BP     4. History of heart transplant on cyclosporine.  Per heart transplant team.     5. Proteinuria. Mild. Less than 1 g per day  Repeat is lower 740 mg.  Will  monitor  Would benefit from an ACE inhibitor or angiotensin receptor blocker, but has mild hyperkalemia     6. Elevated iPTH, likely has primary hyperparathyroidism (HPT)  Primary HPT can explain increased risk of kidney stones  Will re-order sestamibi nuclear scan of the neck, r/o adenoma or hyperplasia    7. Leukopenia noted: mild.  Likely due to CSA.        PLANS AND RECOMMENDATIONS:   As discussed above  RTC 1 month  Multiple issues addressed  Advised pt to avoid high K foods, was given a list of such foods  Ordered sestamibi nuclear scan of the neck to r/o parathyroid gland adenomas  Total time spent 40 minutes. Extensive time spent including the time to review the records, the patient evaluation, documentation, face-to-face  discussion with the patient. More than 50% of the time was spent in counseling  and coordination of care. Level V visit.     Carter Crawford MD

## 2019-08-30 ENCOUNTER — OFFICE VISIT (OUTPATIENT)
Dept: RHEUMATOLOGY | Facility: CLINIC | Age: 64
End: 2019-08-30
Payer: MEDICARE

## 2019-08-30 VITALS
DIASTOLIC BLOOD PRESSURE: 87 MMHG | BODY MASS INDEX: 29.9 KG/M2 | WEIGHT: 162.5 LBS | SYSTOLIC BLOOD PRESSURE: 120 MMHG | HEART RATE: 89 BPM | HEIGHT: 62 IN

## 2019-08-30 DIAGNOSIS — M71.9 BURSITIS, UNSPECIFIED SITE: ICD-10-CM

## 2019-08-30 DIAGNOSIS — Z71.89 COUNSELING ON HEALTH PROMOTION AND DISEASE PREVENTION: ICD-10-CM

## 2019-08-30 DIAGNOSIS — M81.8 OTHER OSTEOPOROSIS WITHOUT CURRENT PATHOLOGICAL FRACTURE: Primary | ICD-10-CM

## 2019-08-30 PROCEDURE — 3074F SYST BP LT 130 MM HG: CPT | Mod: HCNC,CPTII,S$GLB, | Performed by: INTERNAL MEDICINE

## 2019-08-30 PROCEDURE — 3079F PR MOST RECENT DIASTOLIC BLOOD PRESSURE 80-89 MM HG: ICD-10-PCS | Mod: HCNC,CPTII,S$GLB, | Performed by: INTERNAL MEDICINE

## 2019-08-30 PROCEDURE — 3079F DIAST BP 80-89 MM HG: CPT | Mod: HCNC,CPTII,S$GLB, | Performed by: INTERNAL MEDICINE

## 2019-08-30 PROCEDURE — 99205 PR OFFICE/OUTPT VISIT, NEW, LEVL V, 60-74 MIN: ICD-10-PCS | Mod: HCNC,S$GLB,, | Performed by: INTERNAL MEDICINE

## 2019-08-30 PROCEDURE — 99999 PR PBB SHADOW E&M-EST. PATIENT-LVL III: ICD-10-PCS | Mod: PBBFAC,HCNC,, | Performed by: INTERNAL MEDICINE

## 2019-08-30 PROCEDURE — 1101F PR PT FALLS ASSESS DOC 0-1 FALLS W/OUT INJ PAST YR: ICD-10-PCS | Mod: HCNC,CPTII,S$GLB, | Performed by: INTERNAL MEDICINE

## 2019-08-30 PROCEDURE — 99999 PR PBB SHADOW E&M-EST. PATIENT-LVL III: CPT | Mod: PBBFAC,HCNC,, | Performed by: INTERNAL MEDICINE

## 2019-08-30 PROCEDURE — 1101F PT FALLS ASSESS-DOCD LE1/YR: CPT | Mod: HCNC,CPTII,S$GLB, | Performed by: INTERNAL MEDICINE

## 2019-08-30 PROCEDURE — 3074F PR MOST RECENT SYSTOLIC BLOOD PRESSURE < 130 MM HG: ICD-10-PCS | Mod: HCNC,CPTII,S$GLB, | Performed by: INTERNAL MEDICINE

## 2019-08-30 PROCEDURE — 3008F PR BODY MASS INDEX (BMI) DOCUMENTED: ICD-10-PCS | Mod: HCNC,CPTII,S$GLB, | Performed by: INTERNAL MEDICINE

## 2019-08-30 PROCEDURE — 3008F BODY MASS INDEX DOCD: CPT | Mod: HCNC,CPTII,S$GLB, | Performed by: INTERNAL MEDICINE

## 2019-08-30 PROCEDURE — 99205 OFFICE O/P NEW HI 60 MIN: CPT | Mod: HCNC,S$GLB,, | Performed by: INTERNAL MEDICINE

## 2019-08-30 PROCEDURE — 99499 UNLISTED E&M SERVICE: CPT | Mod: HCNC,S$GLB,, | Performed by: INTERNAL MEDICINE

## 2019-08-30 PROCEDURE — 99499 RISK ADDL DX/OHS AUDIT: ICD-10-PCS | Mod: HCNC,S$GLB,, | Performed by: INTERNAL MEDICINE

## 2019-08-30 NOTE — LETTER
August 30, 2019      Carter Crawford MD  [address]  [place] [address]           [facility]  [address]  [place] [address]  Phone: [phone]  Fax: [phone]          Patient: Nadia Damon   MR Number: [id number]   YOB: 1955   Date of Visit: 8/30/2019       Dear Dr. Carter Crawford:    Thank you for referring aNdia Damon to me for evaluation. Attached you will find relevant portions of my assessment and plan of care.    If you have questions, please do not hesitate to call me. I look forward to following Nadia Damon along with you.    Sincerely,    Prasanth Johnson MD    Enclosure  CC:  No Recipients    If you would like to receive this communication electronically, please contact [email or web address] or [phone] to request more information on [employer] access.    For providers and/or their staff who would like to refer a patient to [facility], please contact us through our one-stop-shop provider referral line, [facility], at [phone].    If you feel you have received this communication in error or would no longer like to receive these types of communications, please e-mail [email or web address]

## 2019-08-30 NOTE — PROGRESS NOTES
RHEUMATOLOGY OUTPATIENT CLINIC NOTE    8/30/2019    Attending Rheumatologist: Prasanth Johnson  Primary Care Provider: Richie Cassidy MD   Physician Requesting Consultation: Carter Crawford MD  [id number] [facility]  [place] [id number]  Chief Complaint/Reason For Consultation:  Consult    Subjective:       HPI  Nadia Damon is a 64 y.o. Black or  female with osteopenia referred for therapeutic recommendations.  Patient with past medical history of heart transplant on chronic immunosuppression cyclosporin.  Has history of CKD stage 4 with hyperparathyroidism and prior hypercalcemia, currently being worked up by endocrinology and nephrology.  Has had 2 episodes of fragility fracture on 2014.  Recommended for evaluation to consider for bone antiresorptive therapy.    Today:  Voices no acute complaints.  Main complaint is chronic trochanteric bursa area pain bilaterally.  Worst in the evening, aggravated by prolonged weight-bearing/lying on affected sides.  Relieved somewhat by rest and OTC pain medication.  Denies association with prolonged morning stiffness, redness, or joint swelling.  Has never being on bone antiresorptive therapy.  Unsure if parents had osteoporosis.  Denies frequent episodes of mechanical fall or currently being planned for dental procedures.    Review of Systems   Constitutional: Negative for chills, fever and malaise/fatigue.   Eyes: Negative for pain and redness.   Respiratory: Negative for cough, hemoptysis and shortness of breath.    Cardiovascular: Negative for chest pain and leg swelling.   Gastrointestinal: Negative for abdominal pain, blood in stool and melena.   Genitourinary: Negative for dysuria and hematuria.   Musculoskeletal: Positive for joint pain (Referred on trochanteric bursa area, mechanical pattern.). Negative for falls.        Two episodes of fragility fracture (B/L distal UE), falling from standing height.   Skin: Negative for rash.   Neurological:  Negative for tingling and focal weakness.   Psychiatric/Behavioral: Negative for memory loss. The patient does not have insomnia.      Chronic comorbid conditions affecting medical decision making today:  Past Medical History:   Diagnosis Date    Abdominal wall hernia     CT Renal 6/11/2018---Small fat containing superior ventral abdominal wall hernia at the epicardial pacing lead site.    Anxiety     Arthritis     Chronic kidney disease     Chronic midline low back pain without sciatica 6/18/2018    Coronary artery disease     Depression     Fibromyalgia     on Lyrica    Heart failure     native heart cardiomyopathy    Heart transplanted 1993    due to cardiomyopathy    History of hyperparathyroidism; Hyperparathyroidism, secondary renal     Hypertension     Immune disorder     anti rejection meds    Iron deficiency anemia 8/15/2017    Kidney stones     passed per pt    Obesity     Other osteoporosis without current pathological fracture 8/30/2019    Shingles 2003 approx    left leg    Trouble in sleeping     Urinary incontinence      Past Surgical History:   Procedure Laterality Date    BLADDER SURGERY  2015 approx    mesh - Dr Everett then 2nd reconstructive sx Dr Onofre    BREAST SURGERY Left 9/28/15    Bx - benign    BREAST SURGERY Right 12/2015    Bx benign    CARDIAC PACEMAKER REMOVAL Left 06/26/14    Pacer defirillator removed. Put in 1993 aat time of heart transplant    CARPAL TUNNEL RELEASE Left 03/03/15    Dr. Hall    HEART TRANSPLANT  1993    HERNIA REPAIR Right 1971 approx    Inguinal    HYSTERECTOMY  1983    vag hyst /LSO     RELEASE-CARPAL TUNNEL- LEFT Left 3/3/2015    [facility]    TOE SURGERY       Family History   Problem Relation Age of Onset    Cancer Mother 38        breast    Breast cancer Mother     Heart disease Maternal Grandmother     Cataracts Cousin     Hypertension Son     Diabetes Neg Hx     Stroke Neg Hx      Kidney disease Neg Hx     Asthma Neg Hx     COPD Neg Hx     Melanoma Neg Hx      Social History     Substance and Sexual Activity   Alcohol Use No    Alcohol/week: 0.0 oz     Social History     Tobacco Use   Smoking Status Never Smoker   Smokeless Tobacco Never Used     Social History     Substance and Sexual Activity   Drug Use No       Current Outpatient Medications:     acetaminophen 500 mg/15 mL Liqd, , Disp: , Rfl:     ALPRAZolam (XANAX) 0.5 MG tablet, TAKE 1 TABLET (0.5 MG TOTAL) THREE TIMES DAILY AS NEEDED FOR ANXIETY, Disp: 90 tablet, Rfl: 0    amlodipine (NORVASC) 5 MG tablet, Take 5 mg by mouth once daily., Disp: , Rfl:     ammonium lactate (AMLACTIN) 12 % lotion, Use daily.  Apply to damp skin after bathing., Disp: 225 g, Rfl: 11    aspirin (ECOTRIN) 81 MG EC tablet, Take 1 tablet (81 mg total) by mouth once daily., Disp: 30 tablet, Rfl: 11    baclofen (LIORESAL) 10 MG tablet, TAKE 1 TABLET THREE  TIMES DAILY AS NEEDED FOR MUSCLE SPASM(S)., Disp: 270 tablet, Rfl: 1    benzocaine-menthol 15-2.6 mg Lozg, , Disp: , Rfl:     biotin 10,000 mcg Cap, Take 1 tablet by mouth once daily., Disp: , Rfl:     busPIRone (BUSPAR) 10 MG tablet, TAKE 1 TABLET EVERY DAY, Disp: 90 tablet, Rfl: 1    clobetasol (TEMOVATE) 0.05 % external solution, Apply topically 2 (two) times daily. Apply to the scalp., Disp: 50 mL, Rfl: 2    cycloSPORINE modified, NEORAL, (NEORAL) 100 MG capsule, TAKE 1 CAPSULE EVERY DAY, Disp: 90 capsule, Rfl: 3    cycloSPORINE modified, NEORAL, (NEORAL) 25 MG capsule, TAKE 3 CAPSULES ONCE A DAY, Disp: 270 capsule, Rfl: 3    DULoxetine (CYMBALTA) 30 MG capsule, TAKE 1 CAPSULE EVERY DAY, Disp: 90 capsule, Rfl: 1    estradiol (ESTRACE) 0.01 % (0.1 mg/gram) vaginal cream, Place 1 g vaginally twice a week., Disp: 1 Tube, Rfl: 12    EVENING PRIMROSE OIL ORAL, Take 1,000 mg by mouth once daily., Disp: , Rfl:     ferrous sulfate (FEOSOL) 325 mg (65 mg iron) Tab tablet, Take 1 tablet (325 mg  total) by mouth once daily., Disp: 100 tablet, Rfl: 3    fluticasone (FLONASE) 50 mcg/actuation nasal spray, 2 sprays by Each Nare route once daily. (Patient taking differently: 2 sprays by Each Nare route daily as needed. ), Disp: 1 Bottle, Rfl: 0    ketoconazole (NIZORAL) 2 % shampoo, Apply topically 3 (three) times a week., Disp: 120 mL, Rfl: 5    leg brace (ANKLE SUPPORT MISC), , Disp: , Rfl:     LYRICA 50 mg capsule, TAKE 1 CAPSULE BY MOUTH TWICE A DAY, Disp: 60 capsule, Rfl: 1    metoprolol succinate (TOPROL-XL) 25 MG 24 hr tablet, TAKE 1 TABLET EVERY DAY, Disp: 90 tablet, Rfl: 3    mometasone (ELOCON) 0.1 % lotion, Apply topically once daily., Disp: 180 mL, Rfl: 3    multivitamin capsule, Take 1 capsule by mouth once daily., Disp: , Rfl:     omeprazole (PRILOSEC) 10 MG capsule, TAKE 1 CAPSULE EVERY DAY, Disp: 90 capsule, Rfl: 3    temazepam (RESTORIL) 30 mg capsule, TAKE 1 CAPSULE BY MOUTH NIGHTLY **1 MONTH RESTRICTION**, Disp: 30 capsule, Rfl: 2    vitamin E 400 UNIT capsule, Take 400 Units by mouth once daily., Disp: , Rfl:     zolpidem (AMBIEN) 10 mg Tab, TAKE ONE TABLET BY MOUTH EVERY EVENING., Disp: 30 tablet, Rfl: 2    Current Facility-Administered Medications:     denosumab (PROLIA) injection 60 mg, 60 mg, Subcutaneous, Q6 Months, Prasanth Johnson MD    triamcinolone acetonide injection 10 mg, 10 mg, Intradermal, 1 time in Clinic/HOD, Jose Roland MD     Objective:         Vitals:    08/30/19 0846   BP: 120/87   Pulse: 89     Physical Exam   Constitutional: No distress.   BMI 29.7   HENT:   Head: Normocephalic and atraumatic.   Eyes: Conjunctivae are normal. Pupils are equal, round, and reactive to light.   Neck: Normal range of motion.   Cardiovascular: Normal rate and intact distal pulses.    Pulmonary/Chest: Effort normal. No respiratory distress.   Abdominal: Soft. She exhibits no distension.   Neurological: She is alert. Gait normal.   Skin: No rash noted. No erythema.      Musculoskeletal: Normal range of motion. She exhibits tenderness (Trochanteric bursa right more than left.).   : strong  No synovitis or significant stress tenderness    AROM: intact  PROM: intact    Devices used by patient: none       Reviewed old and all outside pertinent medical records available.    All lab results personally reviewed and interpreted by me.  Lab Results   Component Value Date    WBC 2.84 (L) 06/10/2019    HGB 12.1 06/10/2019    HCT 39.3 06/10/2019    MCV 91 06/10/2019    MCH 27.9 06/10/2019    MCHC 30.8 (L) 06/10/2019    RDW 15.0 (H) 06/10/2019     06/10/2019    MPV 9.6 06/10/2019    PLTEST Normal 08/02/2007       Lab Results   Component Value Date     08/22/2019    K 5.1 08/22/2019     08/22/2019    CO2 26 08/22/2019     (H) 08/22/2019    BUN 31 (H) 08/22/2019    CALCIUM 10.0 08/22/2019    PROT 8.8 (H) 06/10/2019    ALBUMIN 3.3 (L) 08/22/2019    BILITOT 0.6 06/10/2019    AST 45 (H) 06/10/2019    ALKPHOS 149 (H) 06/10/2019    ALT 34 06/10/2019       Lab Results   Component Value Date    COLORU Yellow 08/08/2018    APPEARANCEUA Clear 08/08/2018    SPECGRAV 1.010 08/08/2018    PHUR 6.0 08/08/2018    PROTEINUA 1+ (A) 08/08/2018    KETONESU Negative 08/08/2018    LEUKOCYTESUR 1+ (A) 08/08/2018    NITRITE Negative 08/08/2018    UROBILINOGEN Negative 08/08/2018       No results found for: CRP    No results found for: SEDRATE, ERYTHROCYTES    No results found for: JONNATHAN, RF, SEDRATE    No components found for: 25OHVITDTOT, 70UTISVM6, 67KCXQZZ6, METHODNOTE    Lab Results   Component Value Date    URICACID 6.5 (H) 05/28/2019       No components found for: TSPOTTB    · PTH: 183-> 212 (5/2019)    Rheum Labs:  n/a     Infectious Labs:  Hepatitis profile nonreactive 2007     Imaging:  All imaging reviewed and independently  interpreted by me.    X-ray wrists June 2014   Intra-articular fracture the distal radius.  Avulsion fracture of the ulnar styloid.    DEXA scan  July  2019  FN: -0.5  LS: -1.1  FRAX: 0.5% hip / 8.4% major     ASSESSMENT / PLAN:     Nadia Damon is a 64 y.o. Black or  female with:    1. Osteoporosis  - two isolated episodes of fragility fracture distal radius and ulna likely represent osteoporotic fracture.  - chronic cyclosporin use, osteopenia, hyperparathyroidism, CKD IV  - At high risk of fragility fracture, recommend for bone antiresorptive therapy with denosumab   - Discussed and verbalized understanding concerning the adverse effects of therapy, particularly risk of osteonecrosis   - adequate dietary calcium and vitamin D intake.  - Avoid immobility, fall prevention   - Prior Authorization Order  - denosumab (PROLIA) injection 60 mg  - Comprehensive metabolic panel; Future    2. Trochanteric bursa syndrome  - history of present illness and current findings consistent with DJD.  - Discussed and recommended exercise (jacques non wt-bearing/swimming)  - range of motion, flexibility, and strengthening exercises  - would benefit from increasing please 10% of body weight.  - stretching, massage, ice PRN  - Acetaminophen prn up to 3 g per day.  - Topicals therapy: Capsaicin  - consider for corticosteroid injections    3. Other specified counseling  - over 10 minutes spent regarding below topics:  - fall prevention.  - Immunization counseling done.  - Nutrition and exercise counseling.  - Limitation of alcohol consumption.  - Regular exercise:  Aerobic and resistance.  - Medication counseling provided.    Follow up in about 3 months (around 11/30/2019).    Method of contact with patient concerns: Ana Maria marina Rheumatology    Disclaimer:  This note is prepared using voice recognition software and as such is likely to have errors and has not been proof read. Please contact me for questions.     Time spent: 60 minutes in face to face discussion concerning diagnosis, prognosis, review of lab and test results, benefits of treatment as well as management  of disease, counseling of patient and coordination of care between various health care providers.  Greater than half the time spent was used for coordination of care and counseling of patient.    Prasanth Johnson M.D.  Rheumatology Department   [facility]

## 2019-09-02 DIAGNOSIS — M79.7 FIBROMYALGIA: ICD-10-CM

## 2019-09-02 DIAGNOSIS — I10 ESSENTIAL HYPERTENSION: Chronic | ICD-10-CM

## 2019-09-03 RX ORDER — BUSPIRONE HYDROCHLORIDE 10 MG/1
TABLET ORAL
Qty: 90 TABLET | Refills: 1 | Status: SHIPPED | OUTPATIENT
Start: 2019-09-03 | End: 2020-01-28

## 2019-09-03 RX ORDER — METOPROLOL SUCCINATE 25 MG/1
TABLET, EXTENDED RELEASE ORAL
Qty: 90 TABLET | Refills: 3 | Status: SHIPPED | OUTPATIENT
Start: 2019-09-03 | End: 2020-06-19

## 2019-09-03 RX ORDER — DULOXETIN HYDROCHLORIDE 30 MG/1
CAPSULE, DELAYED RELEASE ORAL
Qty: 90 CAPSULE | Refills: 1 | Status: SHIPPED | OUTPATIENT
Start: 2019-09-03 | End: 2020-01-28

## 2019-09-07 DIAGNOSIS — F51.01 PRIMARY INSOMNIA: ICD-10-CM

## 2019-09-09 RX ORDER — ZOLPIDEM TARTRATE 10 MG/1
TABLET ORAL
Qty: 90 TABLET | Refills: 1 | Status: SHIPPED | OUTPATIENT
Start: 2019-09-09 | End: 2020-02-24 | Stop reason: SDUPTHER

## 2019-09-09 RX ORDER — AMLODIPINE BESYLATE 5 MG/1
5 TABLET ORAL DAILY
Qty: 90 TABLET | Refills: 3 | Status: SHIPPED | OUTPATIENT
Start: 2019-09-09 | End: 2019-09-26

## 2019-09-09 NOTE — TELEPHONE ENCOUNTER
Patient walked in requesting a prescription for amlodipine (NORVASC) 5 MG tablet to be sent to [facility]. Please advise.    LOV: 07/29/2019

## 2019-09-10 ENCOUNTER — HOSPITAL ENCOUNTER (OUTPATIENT)
Dept: RADIOLOGY | Facility: HOSPITAL | Age: 64
Discharge: HOME | End: 2019-09-10
Attending: INTERNAL MEDICINE
Payer: MEDICARE

## 2019-09-10 DIAGNOSIS — E83.52 HYPERCALCEMIA: ICD-10-CM

## 2019-09-10 PROCEDURE — A9500 TC99M SESTAMIBI: HCPCS | Mod: HCNC

## 2019-09-13 ENCOUNTER — INFUSION (OUTPATIENT)
Dept: RHEUMATOLOGY | Facility: HOSPITAL | Age: 64
End: 2019-09-13
Attending: INTERNAL MEDICINE
Payer: MEDICARE

## 2019-09-13 VITALS
SYSTOLIC BLOOD PRESSURE: 135 MMHG | OXYGEN SATURATION: 99 % | TEMPERATURE: 98 F | DIASTOLIC BLOOD PRESSURE: 85 MMHG | WEIGHT: 163.13 LBS | RESPIRATION RATE: 18 BRPM | HEART RATE: 95 BPM | BODY MASS INDEX: 29.84 KG/M2

## 2019-09-13 DIAGNOSIS — M81.0 OSTEOPOROSIS, UNSPECIFIED OSTEOPOROSIS TYPE, UNSPECIFIED PATHOLOGICAL FRACTURE PRESENCE: ICD-10-CM

## 2019-09-13 DIAGNOSIS — M81.8 OTHER OSTEOPOROSIS WITHOUT CURRENT PATHOLOGICAL FRACTURE: Primary | ICD-10-CM

## 2019-09-13 PROCEDURE — 63600175 PHARM REV CODE 636 W HCPCS: Mod: JG,HCNC | Performed by: INTERNAL MEDICINE

## 2019-09-13 PROCEDURE — 96372 THER/PROPH/DIAG INJ SC/IM: CPT | Mod: HCNC

## 2019-09-13 RX ADMIN — DENOSUMAB 60 MG: 60 INJECTION SUBCUTANEOUS at 01:09

## 2019-09-13 NOTE — NURSING
Prolia 60 mg q 6 months  Last dose given-1st dose today    Any invasive dental procedures in past 3 months or upcoming 3 months: denies    Last Rheumatology provider visit- Seen by Dr. Johnson on 8/30/19    Recent labs? 9/13/19;  CKD pt needing repeat labs in 10 days-Yes-scheduled for 9/23/19   Lab Results   Component Value Date    CALCIUM 9.5 09/13/2019     Lab Results   Component Value Date    CREATININE 1.8 (H) 09/13/2019     Lab Results   Component Value Date    ESTGFRAFRICA 34 (A) 09/13/2019     Lab Results   Component Value Date    EGFRNONAA 29 (A) 09/13/2019     No results found for: [id number]       Lot #- [id number]  Expiration Date- 11/21      Prolia 60 mg/ml administered SQ to Left lower abdominal quadrant. Tolerated without any complaints. No redness, swelling, or drainage noted to site. Instructed to remain in clinic 15 minutes after administration to monitor for any s/sx of reaction. Pt instructed on signs and symptoms of reaction to report. Verbalizes understanding.

## 2019-09-13 NOTE — PATIENT INSTRUCTIONS
Prolia (denosumab) injection  What is this medicine?  DENOSUMAB (den oh rosaura mab) slows bone breakdown. Prolia is used to treat osteoporosis in women after menopause and in men. Xgeva is used to prevent bone fractures and other bone problems caused by cancer bone metastases. Xgeva is also used to treat giant cell tumor of the bone.  How should I use this medicine?  This medicine is for injection under the skin. It is given by a health care professional in a hospital or clinic setting.  If you are getting Prolia, a special MedGuide will be given to you by the pharmacist with each prescription and refill. Be sure to read this information carefully each time.  For Prolia, talk to your pediatrician regarding the use of this medicine in children. Special care may be needed. For Xgeva, talk to your pediatrician regarding the use of this medicine in children. While this drug may be prescribed for children as young as 13 years for selected conditions, precautions do apply.  What side effects may I notice from receiving this medicine?  Side effects that you should report to your doctor or health care professional as soon as possible:  · allergic reactions like skin rash, itching or hives, swelling of the face, lips, or tongue  · breathing problems  · chest pain  · fast, irregular heartbeat  · feeling faint or lightheaded, falls  · fever, chills, or any other sign of infection  · muscle spasms, tightening, or twitches  · numbness or tingling  · skin blisters or bumps, or is dry, peels, or red  · slow healing or unexplained pain in the mouth or jaw  · unusual bleeding or bruising  Side effects that usually do not require medical attention (Report these to your doctor or health care professional if they continue or are bothersome.):  · muscle pain  · stomach upset, gas  What may interact with this medicine?  Do not take this medicine with any of the following medications:  · other medicines containing denosumab  This medicine  may also interact with the following medications:  · medicines that suppress the immune system  · medicines that treat cancer  · steroid medicines like prednisone or cortisone  What if I miss a dose?  It is important not to miss your dose. Call your doctor or health care professional if you are unable to keep an appointment.  Where should I keep my medicine?  This medicine is only given in a clinic, doctor's office, or other health care setting and will not be stored at home.  What should I tell my health care provider before I take this medicine?  They need to know if you have any of these conditions:  · dental disease  · eczema  · infection or history of infections  · kidney disease or on dialysis  · low blood calcium or vitamin D  · malabsorption syndrome  · scheduled to have surgery or tooth extraction  · taking medicine that contains denosumab  · thyroid or parathyroid disease  · an unusual reaction to denosumab, other medicines, foods, dyes, or preservatives  · pregnant or trying to get pregnant  · breast-feeding  What should I watch for while using this medicine?  Visit your doctor or health care professional for regular checks on your progress. Your doctor or health care professional may order blood tests and other tests to see how you are doing.  Call your doctor or health care professional if you get a cold or other infection while receiving this medicine. Do not treat yourself. This medicine may decrease your body's ability to fight infection.  You should make sure you get enough calcium and vitamin D while you are taking this medicine, unless your doctor tells you not to. Discuss the foods you eat and the vitamins you take with your health care professional.  See your dentist regularly. Brush and floss your teeth as directed. Before you have any dental work done, tell your dentist you are receiving this medicine.  Do not become pregnant while taking this medicine or for 5 months after stopping it. Women  should inform their doctor if they wish to become pregnant or think they might be pregnant. There is a potential for serious side effects to an unborn child. Talk to your health care professional or pharmacist for more information.  NOTE:This sheet is a summary. It may not cover all possible information. If you have questions about this medicine, talk to your doctor, pharmacist, or health care provider. Copyright© 2017 [employer]

## 2019-09-26 ENCOUNTER — LAB VISIT (OUTPATIENT)
Dept: LAB | Facility: HOSPITAL | Age: 64
End: 2019-09-26
Attending: INTERNAL MEDICINE
Payer: MEDICARE

## 2019-09-26 ENCOUNTER — OFFICE VISIT (OUTPATIENT)
Dept: NEPHROLOGY | Facility: CLINIC | Age: 64
End: 2019-09-26
Payer: MEDICARE

## 2019-09-26 VITALS
DIASTOLIC BLOOD PRESSURE: 80 MMHG | WEIGHT: 165.13 LBS | SYSTOLIC BLOOD PRESSURE: 118 MMHG | BODY MASS INDEX: 30.39 KG/M2 | HEART RATE: 70 BPM | HEIGHT: 62 IN

## 2019-09-26 DIAGNOSIS — Z71.89 ENCOUNTER FOR MEDICATION REVIEW AND COUNSELING: ICD-10-CM

## 2019-09-26 DIAGNOSIS — E83.52 HYPERCALCEMIA: ICD-10-CM

## 2019-09-26 DIAGNOSIS — E21.0 PRIMARY HYPERPARATHYROIDISM: ICD-10-CM

## 2019-09-26 DIAGNOSIS — I10 ESSENTIAL HYPERTENSION: ICD-10-CM

## 2019-09-26 DIAGNOSIS — N18.30 CKD (CHRONIC KIDNEY DISEASE) STAGE 3, GFR 30-59 ML/MIN: Primary | ICD-10-CM

## 2019-09-26 DIAGNOSIS — E83.52 HYPERCALCEMIA: Primary | ICD-10-CM

## 2019-09-26 DIAGNOSIS — N17.9 ACUTE KIDNEY INJURY: ICD-10-CM

## 2019-09-26 LAB
ALBUMIN SERPL BCP-MCNC: 3.2 G/DL (ref 3.5–5.2)
ANION GAP SERPL CALC-SCNC: 9 MMOL/L (ref 8–16)
BUN SERPL-MCNC: 19 MG/DL (ref 8–23)
CALCIUM SERPL-MCNC: 7.9 MG/DL (ref 8.7–10.5)
CHLORIDE SERPL-SCNC: 109 MMOL/L (ref 95–110)
CO2 SERPL-SCNC: 21 MMOL/L (ref 23–29)
CREAT SERPL-MCNC: 1.1 MG/DL (ref 0.5–1.4)
EST. GFR  (AFRICAN AMERICAN): >60 ML/MIN/1.73 M^2
EST. GFR  (NON AFRICAN AMERICAN): 52.8 ML/MIN/1.73 M^2
GLUCOSE SERPL-MCNC: 147 MG/DL (ref 70–110)
PHOSPHATE SERPL-MCNC: 3 MG/DL (ref 2.7–4.5)
POTASSIUM SERPL-SCNC: 4.7 MMOL/L (ref 3.5–5.1)
PTH-INTACT SERPL-MCNC: 620 PG/ML (ref 9–77)
SODIUM SERPL-SCNC: 139 MMOL/L (ref 136–145)

## 2019-09-26 PROCEDURE — 3008F BODY MASS INDEX DOCD: CPT | Mod: HCNC,CPTII,S$GLB, | Performed by: INTERNAL MEDICINE

## 2019-09-26 PROCEDURE — 99499 UNLISTED E&M SERVICE: CPT | Mod: HCNC,S$GLB,, | Performed by: INTERNAL MEDICINE

## 2019-09-26 PROCEDURE — 3074F PR MOST RECENT SYSTOLIC BLOOD PRESSURE < 130 MM HG: ICD-10-PCS | Mod: HCNC,CPTII,S$GLB, | Performed by: INTERNAL MEDICINE

## 2019-09-26 PROCEDURE — 99999 PR PBB SHADOW E&M-EST. PATIENT-LVL IV: ICD-10-PCS | Mod: PBBFAC,HCNC,, | Performed by: INTERNAL MEDICINE

## 2019-09-26 PROCEDURE — 99215 PR OFFICE/OUTPT VISIT, EST, LEVL V, 40-54 MIN: ICD-10-PCS | Mod: HCNC,S$GLB,, | Performed by: INTERNAL MEDICINE

## 2019-09-26 PROCEDURE — 1101F PT FALLS ASSESS-DOCD LE1/YR: CPT | Mod: HCNC,CPTII,S$GLB, | Performed by: INTERNAL MEDICINE

## 2019-09-26 PROCEDURE — 3079F PR MOST RECENT DIASTOLIC BLOOD PRESSURE 80-89 MM HG: ICD-10-PCS | Mod: HCNC,CPTII,S$GLB, | Performed by: INTERNAL MEDICINE

## 2019-09-26 PROCEDURE — 1101F PR PT FALLS ASSESS DOC 0-1 FALLS W/OUT INJ PAST YR: ICD-10-PCS | Mod: HCNC,CPTII,S$GLB, | Performed by: INTERNAL MEDICINE

## 2019-09-26 PROCEDURE — 99999 PR PBB SHADOW E&M-EST. PATIENT-LVL IV: CPT | Mod: PBBFAC,HCNC,, | Performed by: INTERNAL MEDICINE

## 2019-09-26 PROCEDURE — 3079F DIAST BP 80-89 MM HG: CPT | Mod: HCNC,CPTII,S$GLB, | Performed by: INTERNAL MEDICINE

## 2019-09-26 PROCEDURE — 3008F PR BODY MASS INDEX (BMI) DOCUMENTED: ICD-10-PCS | Mod: HCNC,CPTII,S$GLB, | Performed by: INTERNAL MEDICINE

## 2019-09-26 PROCEDURE — 99499 RISK ADDL DX/OHS AUDIT: ICD-10-PCS | Mod: HCNC,S$GLB,, | Performed by: INTERNAL MEDICINE

## 2019-09-26 PROCEDURE — 36415 COLL VENOUS BLD VENIPUNCTURE: CPT | Mod: HCNC

## 2019-09-26 PROCEDURE — 3074F SYST BP LT 130 MM HG: CPT | Mod: HCNC,CPTII,S$GLB, | Performed by: INTERNAL MEDICINE

## 2019-09-26 PROCEDURE — 80069 RENAL FUNCTION PANEL: CPT | Mod: HCNC

## 2019-09-26 PROCEDURE — 83970 ASSAY OF PARATHORMONE: CPT | Mod: HCNC

## 2019-09-26 PROCEDURE — 99215 OFFICE O/P EST HI 40 MIN: CPT | Mod: HCNC,S$GLB,, | Performed by: INTERNAL MEDICINE

## 2019-09-26 RX ORDER — FUROSEMIDE 20 MG/1
TABLET ORAL
Qty: 30 TABLET | Refills: 11 | Status: SHIPPED | OUTPATIENT
Start: 2019-09-26 | End: 2020-08-05

## 2019-09-26 RX ORDER — AMLODIPINE BESYLATE 2.5 MG/1
2.5 TABLET ORAL DAILY
Qty: 30 TABLET | Refills: 11 | Status: SHIPPED | OUTPATIENT
Start: 2019-09-26 | End: 2020-07-02

## 2019-09-26 RX ORDER — TEMAZEPAM 15 MG/1
15 CAPSULE ORAL NIGHTLY
Refills: 2 | COMMUNITY
Start: 2019-09-15 | End: 2019-10-07 | Stop reason: SDUPTHER

## 2019-09-26 RX ORDER — FUROSEMIDE 20 MG/1
TABLET ORAL
Qty: 30 TABLET | Refills: 11 | Status: SHIPPED | OUTPATIENT
Start: 2019-09-26 | End: 2019-09-26 | Stop reason: SDUPTHER

## 2019-09-26 NOTE — PROGRESS NOTES
NEPHROLOGY CLINIC FOLLOWUP NOTE  Date of clinic visit: 9/26/19     REASON FOR FOLLOWUP AND CHIEF COMPLAINT: nephrolithiasis and history of chronic kidney disease post-heart transplant.     HISTORY OF PRESENT ILLNESS: Ms. Damon is a 64-year-old AA female with a history of CKD stage 3, nephrolithiasis (likely mixed stones), and heart transplant in 1993 (is on cyclosporine), who presents for f/u. Pt was last seen by us 1 month ago. Pt has mild, persistent hypercalcemia and increase in iPTH. Primary hyperparathyroidism (HPT) is suspected. Pt presented after having done a sestamibi nuclear scan of the neck. Pt reports no new kidney stones. No discomfort today. Pt has followed medical advice regarding avoiding foods that are risk factor for kidney stones. HCTZ was stopped already due to low BP. her risk factors for kidney stones include primary hyperparathyroidism (HPT) and being on cyclosporine for prevention of heart transplant rejection, which causes hyperuricemia. Pt has a h/o of osteopenia.        To review:   Pt previously developed right sided back pain, thought to be related to kidney stones identified on renal u/s. The stones in the R kidney were non-obstructive but were relatively large at 10 and 12 mm each. Based on h/o of taking cyclosporine to prevent heart transplant rejection and eating a lot of sea food (shrimp and crawfish), uric acid stones were suspected. Pt had additional risk factors for non-uric acid stones, including taking Vit C 1 g/day and Vit D. After dietary modification, the flank pain resolved. A f/u CT scan did not show any stones. It is possible that she passed the 2 stones, as uric acid stones are soluble on urinary alkalinization and can pass by themselves. Pt was advised to stop Vit C and Vit D, which she has. She was also advised on drinking freshly squeezed lemon juice and follow dietary recommendations to avoid nephrolithiasis risk factors. Pt was referred also to urology. A repeat  CT scan did not show any stones. Pt obtained a 24 hour urine collection for kidney stone risk stratification which showed mild hypocitraturia.         To review:  Pt has CKD       PAST MEDICAL HISTORY:  1. CKD stage III. 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 Hypertension.  3. Heart transplant for cardiomyopathy in 1993, the patient has been on chronic  cyclosporine treatment.  4. Carpal tunnel syndrome.  5. Fibromyalgia.  6. GERD.  7. Bell's palsy.  8. Depression.     PAST SURGICAL HISTORY: Reviewed as above, unchanged.     MEDICATIONS: Reviewed    Current Outpatient Medications:     acetaminophen 500 mg/15 mL Liqd, , Disp: , Rfl:     ALPRAZolam (XANAX) 0.5 MG tablet, TAKE 1 TABLET (0.5 MG TOTAL) THREE TIMES DAILY AS NEEDED FOR ANXIETY, Disp: 90 tablet, Rfl: 0    amLODIPine (NORVASC) 2.5 MG tablet, Take 1 tablet (2.5 mg total) by mouth once daily., Disp: 30 tablet, Rfl: 11    ammonium lactate (AMLACTIN) 12 % lotion, Use daily.  Apply to damp skin after bathing., Disp: 225 g, Rfl: 11    aspirin (ECOTRIN) 81 MG EC tablet, Take 1 tablet (81 mg total) by mouth once daily., Disp: 30 tablet, Rfl: 11    baclofen (LIORESAL) 10 MG tablet, TAKE 1 TABLET THREE  TIMES DAILY AS NEEDED FOR MUSCLE SPASM(S)., Disp: 270 tablet, Rfl: 1    benzocaine-menthol 15-2.6 mg Lozg, , Disp: , Rfl:     biotin 10,000 mcg Cap, Take 1 tablet by mouth once daily., Disp: , Rfl:     busPIRone (BUSPAR) 10 MG tablet, TAKE 1 TABLET EVERY DAY, Disp: 90 tablet, Rfl: 1    clobetasol (TEMOVATE) 0.05 % external solution, Apply topically 2 (two) times daily. Apply to the scalp., Disp: 50 mL, Rfl: 2    cycloSPORINE modified, NEORAL, (NEORAL) 100 MG capsule, TAKE 1 CAPSULE EVERY DAY, Disp: 90 capsule, Rfl: 3    cycloSPORINE modified, NEORAL, (NEORAL) 25 MG capsule, TAKE 3 CAPSULES ONCE A DAY, Disp: 270 capsule, Rfl: 3    DULoxetine (CYMBALTA) 30 MG capsule, TAKE 1 CAPSULE  EVERY DAY, Disp: 90 capsule, Rfl: 1    estradiol (ESTRACE) 0.01 % (0.1 mg/gram) vaginal cream, Place 1 g vaginally twice a week., Disp: 1 Tube, Rfl: 12    EVENING PRIMROSE OIL ORAL, Take 1,000 mg by mouth once daily., Disp: , Rfl:     ferrous sulfate (FEOSOL) 325 mg (65 mg iron) Tab tablet, Take 1 tablet (325 mg total) by mouth once daily., Disp: 100 tablet, Rfl: 3    fluticasone (FLONASE) 50 mcg/actuation nasal spray, 2 sprays by Each Nare route once daily. (Patient taking differently: 2 sprays by Each Nare route daily as needed. ), Disp: 1 Bottle, Rfl: 0    ketoconazole (NIZORAL) 2 % shampoo, Apply topically 3 (three) times a week., Disp: 120 mL, Rfl: 5    leg brace (ANKLE SUPPORT MISC), , Disp: , Rfl:     LYRICA 50 mg capsule, TAKE 1 CAPSULE BY MOUTH TWICE A DAY, Disp: 60 capsule, Rfl: 1    metoprolol succinate (TOPROL-XL) 25 MG 24 hr tablet, TAKE 1 TABLET EVERY DAY, Disp: 90 tablet, Rfl: 3    mometasone (ELOCON) 0.1 % lotion, Apply topically once daily., Disp: 180 mL, Rfl: 3    multivitamin capsule, Take 1 capsule by mouth once daily., Disp: , Rfl:     omeprazole (PRILOSEC) 10 MG capsule, TAKE 1 CAPSULE EVERY DAY, Disp: 90 capsule, Rfl: 3    vitamin E 400 UNIT capsule, Take 400 Units by mouth once daily., Disp: , Rfl:     zolpidem (AMBIEN) 10 mg Tab, TAKE 1 TABLET BY MOUTH ONCE DAILY IN THE EVENING, Disp: 90 tablet, Rfl: 1    furosemide (LASIX) 20 MG tablet, Take 1/2 (10 mg) po qd, Disp: 30 tablet, Rfl: 11    temazepam (RESTORIL) 15 mg Cap, Take 15 mg by mouth nightly., Disp: , Rfl: 2    Current Facility-Administered Medications:     denosumab (PROLIA) injection 60 mg, 60 mg, Subcutaneous, Q6 Months, Prasanth Johnson MD    triamcinolone acetonide injection 10 mg, 10 mg, Intradermal, 1 time in Clinic/HOD, Jose Roland MD     SOCIAL HISTORY: Reviewed. Negative for smoking. No alcohol use.      REVIEW OF SYSTEMS: No recent hospitalizations.  GENERAL: Negative.  HEAD, EYES, EARS, NOSE, AND  THROAT: Negative.  CARDIAC: Negative.  PULMONARY: Negative.  GASTROINTESTINAL: Negative.  GENITOURINARY: Negative.  PSYCHOLOGICAL: Negative.  NEUROLOGICAL: Negative.  ENDOCRINE: Negative.  HEMATOLOGIC AND ONCOLOGIC: Negative.  INFECTIOUS DISEASE: Negative.  The rest of the review of systems negative.     PHYSICAL EXAMINATION:  VITAL SIGNS: Repeat blood pressure is 130/90. Pulse is 100, weight is 72.8 Kg, last visit 70.9 Kg  GENERAL: She is cooperative, pleasant, in no acute distress.   Speech and thought process appropriate, normal.  HEENT: Mucous membranes moist.  NECK: Neck JVD. Normal hearing.  ABDOMEN: Soft, nontender.  Side and back: mid back, non tender, no sign of trauma  EXTREMITIES: no edema.     LABS: Reviewed.  BMP  Lab Results   Component Value Date     09/13/2019    K 4.7 09/13/2019     09/13/2019    CO2 25 09/13/2019    BUN 28 (H) 09/13/2019    CREATININE 1.8 (H) 09/13/2019    CALCIUM 9.5 09/13/2019    ANIONGAP 10 09/13/2019    ESTGFRAFRICA 34 (A) 09/13/2019    EGFRNONAA 29 (A) 09/13/2019     Lab Results   Component Value Date    WBC 2.84 (L) 06/10/2019    HGB 12.1 06/10/2019    HCT 39.3 06/10/2019    MCV 91 06/10/2019     06/10/2019     Lab Results   Component Value Date    .0 (H) 05/01/2019    CALCIUM 9.5 09/13/2019    CAION 1.13 09/05/2018    PHOS 4.1 08/22/2019             To review:   24 hour urine: Ca not measured, uric acid 138, Oxalate 20, citrate 203  U/a unremarkable  Urine Cx: neg   Urine P/Cr 740 mg     KUB: unremarkable, except for some constipation     Renal u/s: FINDINGS:  Right kidney: The right kidney measures 9.6 cm. Mild cortical thinning and increased cortical echogenicity.  10 and 12 mm stones are seen in the upper and lower poles respectively.  No cortical thinning. No loss of corticomedullary distinction. No mass. No renal stone. No hydronephrosis.  Left kidney: The left kidney measures 9.3 cm. Mild cortical thinning and increased cortical echogenicity.  No loss of corticomedullary distinction. No mass. No renal stone. No hydronephrosis.  The bladder is partially distended at the time of scanning and has an unremarkable appearance.     CT abd: The left kidney appears slightly small.  Right kidney is grossly normal in size.  No obvious hydronephrosis or nephrolithiasis     Sestamibi nuclear scan of the neck: 8/29/19: no adenomas           ASSESSMENT AND PLAN: This is a 64-year-old female who presents for follow up of hypercalcemia and suspected primary HPT. Pt has a h/o of nephrolithiasis and CKD   Patient has a h/o of heart transplant  The impression is as follows:     1. Nephrolithiasis: no new stones.  Has multiple risk factors for kidney stones: (hyperuricemia from cyclosporine, diet rich in uric acid, previously taking Vit C, vit D, and calcium,, CKD, and having primary hyperparathyroidism)  Stones are likely mixed ca oxalate and uric acid kidney stones  No longer taking Vit C  No longer takes Vit D and Ca. But this question needs to be re-visited, as pt now is said to have osteopenia. Will refer pt to rheumatology.  Mild hyperuricemia, from cyclosporine.  Mild persistent hypercalcemia (corrected ca for low albumin is slightly elevated)     F/u CT did not show any stones  U/s earlier showed non-obstructive 2 R stones, relatively large  May have passed the stones     24 hour urine for kidney stone stratification shows hypocitraturia  Pt was advised NOT TO STOP cyclosporine.     Pt was again advised of dietary risk factors for kidney stones, was given printed literature  Advised pt to:  -keep water intake to >2 L/day  -keep salt in diet to <3 g/day  -reduce meat intake, specially seafood  -keep Ca intake average  -NoVit C  -No Vit D? Now has osteopenia.     2. Renal. 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 HTN: BP controlled  Reviewed meds with pt  Please note that HCTZ can be used for stone prevention (is hypocalciuric), HCTZ was stopped for low BP     4. History of heart transplant on cyclosporine.  Per heart transplant team.     5. Proteinuria. Mild. Less than 1 g per day  Repeat is lower 740 mg.  Will monitor  Would benefit from an ACE inhibitor or angiotensin receptor blocker, but has mild hyperkalemia     6. Elevated iPTH, likely has primary hyperparathyroidism (HPT)  No adenomas found on sestamibi nulcear scan  May have diffuse hyperplasia  Primary HPT can explain increased risk of kidney stones     7. Leukopenia noted: mild.  Likely due to CSA.        PLANS AND RECOMMENDATIONS:   As discussed above  Start lasix 10 mg po qd to keep s ca lower  Decrease amlodipine from 5 to 2.5 mg po qd  RTC 3-4 months, did not want to come back sooner due to cost of co-pay  Multiple issues addressed  Total time spent 40 minutes. Extensive time spent including the time to review the records, the patient evaluation, documentation, face-to-face  discussion with the patient. More than 50% of the time was spent in counseling  and coordination of care. Level V visit.     Carter Crawford MD

## 2019-09-30 ENCOUNTER — TELEPHONE (OUTPATIENT)
Dept: INTERNAL MEDICINE | Facility: CLINIC | Age: 64
End: 2019-09-30

## 2019-09-30 NOTE — TELEPHONE ENCOUNTER
Patient called upset wanting to know why the dosage of temazepam (RESTORIL) 15 mg Cap was changed from 30mg to 15mg.  Please advise if patient suppose to be taking 15mg or 30mg.

## 2019-09-30 NOTE — TELEPHONE ENCOUNTER
I do not know why it changed.  But if she is used to taking 30 mg a day take 2 of the 15 mg and prior to running out I will refill at 30 mg.  When she calls for the refill remind us that is 30 mg

## 2019-09-30 NOTE — TELEPHONE ENCOUNTER
----- Message from Ayla Ward sent at 9/30/2019  9:53 AM CDT -----  Contact: pt  Pt is calling the staff regarding the pt medication has been changed to a different mg without an Exam  pt stated that the staff did not go over the meds with the patient to change  temazepam (RESTORIL) 15 mg Cap .  Pt stated that the original RX was 30 mg and now its 15 mg.   Pt stated that the changed was done without a cause or notification to the pt   Pt is still having trouble sleeping.    Pt call back today [phone]  Thanks

## 2019-10-07 DIAGNOSIS — F51.01 PRIMARY INSOMNIA: ICD-10-CM

## 2019-10-07 RX ORDER — ZOLPIDEM TARTRATE 10 MG/1
TABLET ORAL
Qty: 90 TABLET | Refills: 1 | Status: CANCELLED | OUTPATIENT
Start: 2019-10-07

## 2019-10-07 RX ORDER — TEMAZEPAM 30 MG/1
30 CAPSULE ORAL NIGHTLY
Qty: 30 CAPSULE | Refills: 5 | Status: SHIPPED | OUTPATIENT
Start: 2019-10-07 | End: 2020-03-31

## 2019-10-07 NOTE — TELEPHONE ENCOUNTER
----- Message from Heidi Mcneal sent at 10/7/2019  9:01 AM CDT -----  Contact: Pt  Type:  RX Refill Request    Who Called: Nadia Damon  Refill or New Rx:Refill  RX Name and Strength:temazepam (RESTORIL) 15 mg Cap  How is the patient currently taking it? (ex. 1XDay):  Is this a 30 day or 90 day RX:  Preferred Pharmacy with phone number:  [facility]/pharmacy #[id number] - [place], [place] - [address] [address] [address]  Phone: [phone] Fax: [phone]  Local or Mail Order:Local  Ordering Provider:Dr. Cassidy  Would the patient rather a call back or a response via [facility]? Call Back  Best Call Back Number:[phone] (home)   Additional Information: Pt stated that the medication is 30 mg

## 2019-10-07 NOTE — TELEPHONE ENCOUNTER
Spoke with pt, states that she had called last week and the dosage on her Restoril was supposed to be changed back to 30mg instead of the 15mg. The order that was put in for the 15mg on 9/15/19 was never received. She would like for it to go to local pharmacy. Please advise.

## 2019-10-28 PROBLEM — Z13.820 OSTEOPOROSIS SCREENING: Status: RESOLVED | Noted: 2017-10-16 | Resolved: 2019-10-28

## 2019-10-29 ENCOUNTER — HOSPITAL ENCOUNTER (OUTPATIENT)
Dept: RADIOLOGY | Facility: HOSPITAL | Age: 64
Discharge: HOME | End: 2019-10-29
Attending: FAMILY MEDICINE
Payer: MEDICARE

## 2019-10-29 ENCOUNTER — OFFICE VISIT (OUTPATIENT)
Dept: INTERNAL MEDICINE | Facility: CLINIC | Age: 64
End: 2019-10-29
Payer: MEDICARE

## 2019-10-29 VITALS
DIASTOLIC BLOOD PRESSURE: 74 MMHG | HEART RATE: 72 BPM | WEIGHT: 164.38 LBS | HEIGHT: 62 IN | BODY MASS INDEX: 30.25 KG/M2 | SYSTOLIC BLOOD PRESSURE: 118 MMHG | OXYGEN SATURATION: 98 % | TEMPERATURE: 98 F

## 2019-10-29 DIAGNOSIS — R05.9 COUGH: Primary | ICD-10-CM

## 2019-10-29 DIAGNOSIS — Z28.39 IMMUNIZATION DEFICIENCY: ICD-10-CM

## 2019-10-29 DIAGNOSIS — D84.9 IMMUNOCOMPROMISED PATIENT: ICD-10-CM

## 2019-10-29 DIAGNOSIS — R05.9 COUGH: ICD-10-CM

## 2019-10-29 DIAGNOSIS — Z94.1 HEART TRANSPLANTED: ICD-10-CM

## 2019-10-29 DIAGNOSIS — I10 ESSENTIAL HYPERTENSION: ICD-10-CM

## 2019-10-29 PROCEDURE — 3074F SYST BP LT 130 MM HG: CPT | Mod: HCNC,CPTII,S$GLB, | Performed by: FAMILY MEDICINE

## 2019-10-29 PROCEDURE — 99999 PR PBB SHADOW E&M-EST. PATIENT-LVL V: ICD-10-PCS | Mod: PBBFAC,HCNC,, | Performed by: FAMILY MEDICINE

## 2019-10-29 PROCEDURE — 3078F DIAST BP <80 MM HG: CPT | Mod: HCNC,CPTII,S$GLB, | Performed by: FAMILY MEDICINE

## 2019-10-29 PROCEDURE — 71046 X-RAY EXAM CHEST 2 VIEWS: CPT | Mod: TC,HCNC

## 2019-10-29 PROCEDURE — 99499 UNLISTED E&M SERVICE: CPT | Mod: HCNC,S$GLB,, | Performed by: FAMILY MEDICINE

## 2019-10-29 PROCEDURE — 3008F PR BODY MASS INDEX (BMI) DOCUMENTED: ICD-10-PCS | Mod: HCNC,CPTII,S$GLB, | Performed by: FAMILY MEDICINE

## 2019-10-29 PROCEDURE — G0008 ADMIN INFLUENZA VIRUS VAC: HCPCS | Mod: HCNC,S$GLB,, | Performed by: FAMILY MEDICINE

## 2019-10-29 PROCEDURE — G0008 FLU VACCINE - HIGH DOSE (65+) PRESERVATIVE FREE IM: ICD-10-PCS | Mod: HCNC,S$GLB,, | Performed by: FAMILY MEDICINE

## 2019-10-29 PROCEDURE — 3074F PR MOST RECENT SYSTOLIC BLOOD PRESSURE < 130 MM HG: ICD-10-PCS | Mod: HCNC,CPTII,S$GLB, | Performed by: FAMILY MEDICINE

## 2019-10-29 PROCEDURE — 1101F PT FALLS ASSESS-DOCD LE1/YR: CPT | Mod: HCNC,CPTII,S$GLB, | Performed by: FAMILY MEDICINE

## 2019-10-29 PROCEDURE — 1101F PR PT FALLS ASSESS DOC 0-1 FALLS W/OUT INJ PAST YR: ICD-10-PCS | Mod: HCNC,CPTII,S$GLB, | Performed by: FAMILY MEDICINE

## 2019-10-29 PROCEDURE — 71046 XR CHEST PA AND LATERAL: ICD-10-PCS | Mod: 26,HCNC,, | Performed by: RADIOLOGY

## 2019-10-29 PROCEDURE — 71046 X-RAY EXAM CHEST 2 VIEWS: CPT | Mod: 26,HCNC,, | Performed by: RADIOLOGY

## 2019-10-29 PROCEDURE — 99214 PR OFFICE/OUTPT VISIT, EST, LEVL IV, 30-39 MIN: ICD-10-PCS | Mod: 25,HCNC,S$GLB, | Performed by: FAMILY MEDICINE

## 2019-10-29 PROCEDURE — 3008F BODY MASS INDEX DOCD: CPT | Mod: HCNC,CPTII,S$GLB, | Performed by: FAMILY MEDICINE

## 2019-10-29 PROCEDURE — 90662 FLU VACCINE - HIGH DOSE (65+) PRESERVATIVE FREE IM: ICD-10-PCS | Mod: HCNC,S$GLB,, | Performed by: FAMILY MEDICINE

## 2019-10-29 PROCEDURE — 99999 PR PBB SHADOW E&M-EST. PATIENT-LVL V: CPT | Mod: PBBFAC,HCNC,, | Performed by: FAMILY MEDICINE

## 2019-10-29 PROCEDURE — 90662 IIV NO PRSV INCREASED AG IM: CPT | Mod: HCNC,S$GLB,, | Performed by: FAMILY MEDICINE

## 2019-10-29 PROCEDURE — 3078F PR MOST RECENT DIASTOLIC BLOOD PRESSURE < 80 MM HG: ICD-10-PCS | Mod: HCNC,CPTII,S$GLB, | Performed by: FAMILY MEDICINE

## 2019-10-29 PROCEDURE — 99214 OFFICE O/P EST MOD 30 MIN: CPT | Mod: 25,HCNC,S$GLB, | Performed by: FAMILY MEDICINE

## 2019-10-29 PROCEDURE — 99499 RISK ADDL DX/OHS AUDIT: ICD-10-PCS | Mod: HCNC,S$GLB,, | Performed by: FAMILY MEDICINE

## 2019-10-29 NOTE — PROGRESS NOTES
Subjective:       Patient ID: Nadia Damon is a 64 y.o. female.    Chief Complaint: Follow-up and tightness on chest    She is status post heart transplant with stage IV  chronic kidney disease. She is followed by Transplant  team with good recent results.  She is followed by Nephrology with recent GFR greater than 60.  She has had several months of occasional cough with minimal sputum.  After heart coughing she has noticed some blood in the sputum.  She denies fever chills decrease in appetite and weight loss.  She denies any pleuritic chest pain. Medications reviewed.    Review of Systems   Constitutional: Negative for activity change, appetite change, chills, diaphoresis, fatigue, fever and unexpected weight change.   Respiratory: Positive for cough. Negative for chest tightness, shortness of breath and stridor.    Cardiovascular: Negative for chest pain and palpitations.        Occasional ankle swelling.  Nephrology prescribed Lasix to use p.r.n..  She denies lightheadedness.   Neurological: Negative for dizziness, syncope, weakness, light-headedness and headaches.       Objective:      Physical Exam   Constitutional: She appears well-developed and well-nourished. No distress.   Cardiovascular: Normal rate and regular rhythm. Exam reveals no friction rub.   No murmur heard.  Third heart sound is heard   Pulmonary/Chest: Effort normal and breath sounds normal. No respiratory distress. She has no wheezes. She has no rales.   Abdominal: Soft. Bowel sounds are normal. She exhibits no distension and no mass. There is no tenderness.   Skin: She is not diaphoretic.       Lab Visit on 09/26/2019   Component Date Value Ref Range Status    Specimen UA 09/26/2019 Urine, Clean Catch   Final    Color, UA 09/26/2019 Yellow  Yellow, Straw, [place] Final    Appearance, UA 09/26/2019 Hazy* Clear Final    pH, UA 09/26/2019 6.0  5.0 - 8.0 Final    Specific Gravity, UA 09/26/2019 1.010  1.005 - 1.030 Final    Protein, UA  09/26/2019 Trace* Negative Final    Glucose, UA 09/26/2019 Negative  Negative Final    Ketones, UA 09/26/2019 Negative  Negative Final    Bilirubin (UA) 09/26/2019 Negative  Negative Final    Occult Blood UA 09/26/2019 Negative  Negative Final    Nitrite, UA 09/26/2019 Negative  Negative Final    Leukocytes, UA 09/26/2019 Negative  Negative Final    Protein, Urine Random 09/26/2019 30* 0 - 15 mg/dL Final    Creatinine, Random Ur 09/26/2019 56.0  15.0 - 325.0 mg/dL Final    Prot/Creat Ratio, Ur 09/26/2019 0.54* 0.00 - 0.20 Final     Assessment:       1. Cough        Plan:     Blood pressure 118/74 pulse 72 and regular.  Flu VAX given today.  Discussed need for shingles immunization.  She had shingles years ago.  She is on cyclosporin and is immunocompromised.  Chest x-ray ordered.  Follow-up in 3 months    Cough  -     X-Ray Chest PA And Lateral; Future; Expected date: 10/29/2019    Other orders  -     Influenza - High Dose (65+) (PF) (IM)

## 2019-10-31 ENCOUNTER — TELEPHONE (OUTPATIENT)
Dept: NEPHROLOGY | Facility: CLINIC | Age: 64
End: 2019-10-31

## 2019-10-31 NOTE — TELEPHONE ENCOUNTER
----- Message from Antionette Alejandro sent at 10/31/2019  3:36 PM CDT -----  Contact: patient  Type:  Test Results    Who Called: patient  Name of Test (Lab/Mammo/Etc): blood work  Date of Test: late Sept  Ordering Provider: [facility]  Where the test was performed: [facility]  Would the patient rather a call back or a response via [facility]? call  Best Call Back Number: [phone]  Additional Information:  Have questions concerning lab work results. Please call ASATOBIAS Paniagua, sebastian

## 2019-10-31 NOTE — TELEPHONE ENCOUNTER
Returned call to patient. Informed that per Dr. Crawford's note her creatinine is back to normal. She expressed understanding.

## 2019-11-18 DIAGNOSIS — Z94.1 HEART TRANSPLANTED: Primary | Chronic | ICD-10-CM

## 2019-11-18 DIAGNOSIS — M62.838 MUSCLE SPASMS OF BOTH LOWER EXTREMITIES: ICD-10-CM

## 2019-11-18 RX ORDER — CYCLOSPORINE 100 MG/1
CAPSULE, LIQUID FILLED ORAL
Qty: 90 CAPSULE | Refills: 0 | Status: SHIPPED | OUTPATIENT
Start: 2019-11-18 | End: 2020-01-28

## 2019-11-18 RX ORDER — OMEPRAZOLE 10 MG/1
CAPSULE, DELAYED RELEASE ORAL
Qty: 90 CAPSULE | Refills: 0 | Status: SHIPPED | OUTPATIENT
Start: 2019-11-18 | End: 2020-01-28

## 2019-11-18 RX ORDER — BACLOFEN 10 MG/1
TABLET ORAL
Qty: 270 TABLET | Refills: 0 | Status: SHIPPED | OUTPATIENT
Start: 2019-11-18 | End: 2020-01-28

## 2019-11-18 RX ORDER — CYCLOSPORINE 25 MG/1
CAPSULE, LIQUID FILLED ORAL
Qty: 270 CAPSULE | Refills: 0 | Status: SHIPPED | OUTPATIENT
Start: 2019-11-18 | End: 2020-01-28

## 2019-11-29 ENCOUNTER — OFFICE VISIT (OUTPATIENT)
Dept: RHEUMATOLOGY | Facility: CLINIC | Age: 64
End: 2019-11-29
Payer: MEDICARE

## 2019-11-29 VITALS
WEIGHT: 170 LBS | HEIGHT: 62 IN | SYSTOLIC BLOOD PRESSURE: 130 MMHG | DIASTOLIC BLOOD PRESSURE: 89 MMHG | BODY MASS INDEX: 31.28 KG/M2 | HEART RATE: 95 BPM

## 2019-11-29 DIAGNOSIS — Z71.89 COUNSELING ON HEALTH PROMOTION AND DISEASE PREVENTION: ICD-10-CM

## 2019-11-29 DIAGNOSIS — M81.8 OTHER OSTEOPOROSIS WITHOUT CURRENT PATHOLOGICAL FRACTURE: ICD-10-CM

## 2019-11-29 DIAGNOSIS — M15.9 PRIMARY OSTEOARTHRITIS INVOLVING MULTIPLE JOINTS: Primary | ICD-10-CM

## 2019-11-29 PROCEDURE — 3075F PR MOST RECENT SYSTOLIC BLOOD PRESS GE 130-139MM HG: ICD-10-PCS | Mod: HCNC,CPTII,S$GLB, | Performed by: INTERNAL MEDICINE

## 2019-11-29 PROCEDURE — 99214 OFFICE O/P EST MOD 30 MIN: CPT | Mod: HCNC,S$GLB,, | Performed by: INTERNAL MEDICINE

## 2019-11-29 PROCEDURE — 1101F PR PT FALLS ASSESS DOC 0-1 FALLS W/OUT INJ PAST YR: ICD-10-PCS | Mod: HCNC,CPTII,S$GLB, | Performed by: INTERNAL MEDICINE

## 2019-11-29 PROCEDURE — 1101F PT FALLS ASSESS-DOCD LE1/YR: CPT | Mod: HCNC,CPTII,S$GLB, | Performed by: INTERNAL MEDICINE

## 2019-11-29 PROCEDURE — 99999 PR PBB SHADOW E&M-EST. PATIENT-LVL III: CPT | Mod: PBBFAC,HCNC,, | Performed by: INTERNAL MEDICINE

## 2019-11-29 PROCEDURE — 99999 PR PBB SHADOW E&M-EST. PATIENT-LVL III: ICD-10-PCS | Mod: PBBFAC,HCNC,, | Performed by: INTERNAL MEDICINE

## 2019-11-29 PROCEDURE — 3008F BODY MASS INDEX DOCD: CPT | Mod: HCNC,CPTII,S$GLB, | Performed by: INTERNAL MEDICINE

## 2019-11-29 PROCEDURE — 3075F SYST BP GE 130 - 139MM HG: CPT | Mod: HCNC,CPTII,S$GLB, | Performed by: INTERNAL MEDICINE

## 2019-11-29 PROCEDURE — 3079F PR MOST RECENT DIASTOLIC BLOOD PRESSURE 80-89 MM HG: ICD-10-PCS | Mod: HCNC,CPTII,S$GLB, | Performed by: INTERNAL MEDICINE

## 2019-11-29 PROCEDURE — 3079F DIAST BP 80-89 MM HG: CPT | Mod: HCNC,CPTII,S$GLB, | Performed by: INTERNAL MEDICINE

## 2019-11-29 PROCEDURE — 3008F PR BODY MASS INDEX (BMI) DOCUMENTED: ICD-10-PCS | Mod: HCNC,CPTII,S$GLB, | Performed by: INTERNAL MEDICINE

## 2019-11-29 PROCEDURE — 99214 PR OFFICE/OUTPT VISIT, EST, LEVL IV, 30-39 MIN: ICD-10-PCS | Mod: HCNC,S$GLB,, | Performed by: INTERNAL MEDICINE

## 2019-11-29 NOTE — PROGRESS NOTES
RHEUMATOLOGY OUTPATIENT CLINIC NOTE    11/29/2019    Attending Rheumatologist: Prasanth Johnson  Primary Care Provider: Richie Cassidy MD   Physician Requesting Consultation: No referring provider defined for this encounter.  Chief Complaint/Reason For Consultation:  Disease Management    Subjective:       HPI  Nadia Damon is a 64 y.o. Black or  female with osteopenia and osteoarthritis comes for follow-up.    Today  Last seen on August.  Recommended for Prolia which she received in September without any significant side effects.  Recommendations provided for osteoarthritis.  No acute complaints.  Denies joint pain or stiffness.  Able to perform activities of daily living without particular difficulty.  No episodes of mechanical fall or fracture since last visit.    Review of Systems   Constitutional: Negative for chills, fever and malaise/fatigue.   Eyes: Negative for pain and redness.   Respiratory: Negative for cough, hemoptysis and shortness of breath.    Cardiovascular: Negative for chest pain and leg swelling.   Gastrointestinal: Negative for abdominal pain, blood in stool and melena.   Genitourinary: Negative for dysuria and hematuria.   Musculoskeletal: Negative for falls and joint pain.   Skin: Negative for rash.   Neurological: Negative for tingling and focal weakness.   Psychiatric/Behavioral: Negative for memory loss. The patient does not have insomnia.      Chronic comorbid conditions affecting medical decision making today:  Past Medical History:   Diagnosis Date    Abdominal wall hernia     CT Renal 6/11/2018---Small fat containing superior ventral abdominal wall hernia at the epicardial pacing lead site.    Anxiety     Arthritis     Chronic kidney disease     Chronic midline low back pain without sciatica 6/18/2018    Coronary artery disease     Depression     Fibromyalgia     on Lyrica    Heart failure     native heart cardiomyopathy    Heart transplanted 1993     due to cardiomyopathy    History of hyperparathyroidism; Hyperparathyroidism, secondary renal     Hypertension     Immune disorder     anti rejection meds    Iron deficiency anemia 8/15/2017    Kidney stones     passed per pt    Obesity     Other osteoporosis without current pathological fracture 8/30/2019    Shingles 2003 approx    left leg    Trouble in sleeping     Urinary incontinence      Past Surgical History:   Procedure Laterality Date    BLADDER SURGERY  2015 approx    mesh - Dr Everett then 2nd reconstructive sx Dr Onofre    BREAST SURGERY Left 9/28/15    Bx - benign    BREAST SURGERY Right 12/2015    Bx benign    CARDIAC PACEMAKER REMOVAL Left 06/26/14    Pacer defirillator removed. Put in 1993 aat time of heart transplant    CARPAL TUNNEL RELEASE Left 03/03/15    Dr. Hall    HEART TRANSPLANT  1993    HERNIA REPAIR Right 1971 approx    Inguinal    HYSTERECTOMY  1983    vag hyst /LSO     TOE SURGERY       Family History   Problem Relation Age of Onset    Cancer Mother 38        breast    Breast cancer Mother     Heart disease Maternal Grandmother     Cataracts Cousin     Hypertension Son     Diabetes Neg Hx     Stroke Neg Hx     Kidney disease Neg Hx     Asthma Neg Hx     COPD Neg Hx     Melanoma Neg Hx      Social History     Substance and Sexual Activity   Alcohol Use No    Alcohol/week: 0.0 standard drinks     Social History     Tobacco Use   Smoking Status Never Smoker   Smokeless Tobacco Never Used     Social History     Substance and Sexual Activity   Drug Use No       Current Outpatient Medications:     acetaminophen 500 mg/15 mL Liqd, , Disp: , Rfl:     ALPRAZolam (XANAX) 0.5 MG tablet, TAKE 1 TABLET (0.5 MG TOTAL) THREE TIMES DAILY AS NEEDED FOR ANXIETY, Disp: 90 tablet, Rfl: 0    amLODIPine (NORVASC) 2.5 MG tablet, Take 1 tablet (2.5 mg total) by mouth once daily., Disp: 30 tablet, Rfl: 11    ammonium lactate (AMLACTIN) 12 % lotion, Use daily.  Apply to damp  skin after bathing., Disp: 225 g, Rfl: 11    aspirin (ECOTRIN) 81 MG EC tablet, Take 1 tablet (81 mg total) by mouth once daily., Disp: 30 tablet, Rfl: 11    baclofen (LIORESAL) 10 MG tablet, TAKE 1 TABLET THREE  TIMES DAILY AS NEEDED FOR MUSCLE SPASM(S)., Disp: 270 tablet, Rfl: 0    benzocaine-menthol 15-2.6 mg Lozg, , Disp: , Rfl:     biotin 10,000 mcg Cap, Take 1 tablet by mouth once daily., Disp: , Rfl:     busPIRone (BUSPAR) 10 MG tablet, TAKE 1 TABLET EVERY DAY, Disp: 90 tablet, Rfl: 1    clobetasol (TEMOVATE) 0.05 % external solution, Apply topically 2 (two) times daily. Apply to the scalp., Disp: 50 mL, Rfl: 2    cycloSPORINE modified, NEORAL, (NEORAL) 100 MG capsule, TAKE 1 CAPSULE EVERY DAY, Disp: 90 capsule, Rfl: 0    cycloSPORINE modified, NEORAL, (NEORAL) 25 MG capsule, TAKE 3 CAPSULES ONCE A DAY, Disp: 270 capsule, Rfl: 0    DULoxetine (CYMBALTA) 30 MG capsule, TAKE 1 CAPSULE EVERY DAY, Disp: 90 capsule, Rfl: 1    estradiol (ESTRACE) 0.01 % (0.1 mg/gram) vaginal cream, Place 1 g vaginally twice a week., Disp: 1 Tube, Rfl: 12    EVENING PRIMROSE OIL ORAL, Take 1,000 mg by mouth once daily., Disp: , Rfl:     ferrous sulfate (FEOSOL) 325 mg (65 mg iron) Tab tablet, Take 1 tablet (325 mg total) by mouth once daily., Disp: 100 tablet, Rfl: 3    fluticasone (FLONASE) 50 mcg/actuation nasal spray, 2 sprays by Each Nare route once daily. (Patient taking differently: 2 sprays by Each Nare route daily as needed. ), Disp: 1 Bottle, Rfl: 0    furosemide (LASIX) 20 MG tablet, Take 1/2 (10 mg) po qd, Disp: 30 tablet, Rfl: 11    ketoconazole (NIZORAL) 2 % shampoo, Apply topically 3 (three) times a week., Disp: 120 mL, Rfl: 5    LYRICA 50 mg capsule, TAKE 1 CAPSULE BY MOUTH TWICE A DAY, Disp: 60 capsule, Rfl: 1    metoprolol succinate (TOPROL-XL) 25 MG 24 hr tablet, TAKE 1 TABLET EVERY DAY, Disp: 90 tablet, Rfl: 3    multivitamin capsule, Take 1 capsule by mouth once daily., Disp: , Rfl:      "omeprazole (PRILOSEC) 10 MG capsule, TAKE 1 CAPSULE EVERY DAY, Disp: 90 capsule, Rfl: 0    temazepam (RESTORIL) 30 mg capsule, Take 1 capsule (30 mg total) by mouth nightly., Disp: 30 capsule, Rfl: 5    vitamin E 400 UNIT capsule, Take 400 Units by mouth once daily., Disp: , Rfl:     zolpidem (AMBIEN) 10 mg Tab, TAKE 1 TABLET BY MOUTH ONCE DAILY IN THE EVENING, Disp: 90 tablet, Rfl: 1    leg brace (ANKLE SUPPORT MISC), , Disp: , Rfl:     mometasone (ELOCON) 0.1 % lotion, Apply topically once daily., Disp: 180 mL, Rfl: 3    Current Facility-Administered Medications:     denosumab (PROLIA) injection 60 mg, 60 mg, Subcutaneous, Q6 Months, Prasanth Johnson MD    triamcinolone acetonide injection 10 mg, 10 mg, Intradermal, 1 time in Clinic/HOD, Jose Roland MD     Objective:         Vitals:    11/29/19 1348   BP: 130/89   Pulse: 95     Physical Exam   Constitutional: No distress.   Estimated body mass index is 31.09 kg/m² as calculated from the following:    Height as of this encounter: 5' 2" (1.575 m).    Weight as of this encounter: 77.1 kg (169 lb 15.6 oz).    Wt Readings from Last 1 Encounters:  11/29/19 1348 : 77.1 kg (169 lb 15.6 oz)     HENT:   Head: Normocephalic and atraumatic.   Eyes: Conjunctivae are normal. Pupils are equal, round, and reactive to light.   Neck: Normal range of motion.   Cardiovascular: Normal rate and intact distal pulses.    Pulmonary/Chest: Effort normal. No respiratory distress.   Abdominal: Soft. She exhibits no distension.   Neurological: She is alert. Gait normal.   Skin: No rash noted. No erythema.     Musculoskeletal: Normal range of motion.   : strong  No synovitis       Reviewed old and all outside pertinent medical records available.    All lab results personally reviewed and interpreted by me.  Lab Results   Component Value Date    WBC 2.84 (L) 06/10/2019    HGB 12.1 06/10/2019    HCT 39.3 06/10/2019    MCV 91 06/10/2019    MCH 27.9 06/10/2019    MCHC 30.8 (L) " 06/10/2019    RDW 15.0 (H) 06/10/2019     06/10/2019    MPV 9.6 06/10/2019    PLTEST Normal 08/02/2007       Lab Results   Component Value Date     09/26/2019    K 4.7 09/26/2019     09/26/2019    CO2 21 (L) 09/26/2019     (H) 09/26/2019    BUN 19 09/26/2019    CALCIUM 7.9 (L) 09/26/2019    PROT 8.4 09/13/2019    ALBUMIN 3.2 (L) 09/26/2019    BILITOT 0.7 09/13/2019    AST 33 09/13/2019    ALKPHOS 142 (H) 09/13/2019    ALT 18 09/13/2019       Lab Results   Component Value Date    COLORU Yellow 09/26/2019    APPEARANCEUA Hazy (A) 09/26/2019    SPECGRAV 1.010 09/26/2019    PHUR 6.0 09/26/2019    PROTEINUA Trace (A) 09/26/2019    KETONESU Negative 09/26/2019    LEUKOCYTESUR Negative 09/26/2019    NITRITE Negative 09/26/2019    UROBILINOGEN Negative 08/08/2018       No results found for: CRP    No results found for: SEDRATE, ERYTHROCYTES    No results found for: JONNATHAN, RF, SEDRATE    No components found for: 25OHVITDTOT, 12IVGITC2, 15GRYLBL4, METHODNOTE    Lab Results   Component Value Date    URICACID 6.5 (H) 05/28/2019       No components found for: TSPOTTB    · PTH: 183-> 212 (5/2019)    Rheum Labs:  n/a     Infectious Labs:  Hepatitis profile nonreactive 2007     Imaging:  All imaging reviewed and independently  interpreted by me.    X-ray wrists June 2014   Intra-articular fracture the distal radius.  Avulsion fracture of the ulnar styloid.    DEXA scan  July 2019  FN: -0.5  LS: -1.1  FRAX: 0.5% hip / 8.4% major     ASSESSMENT / PLAN:     Nadia Damon is a 64 y.o. Black or  female with:    1. Osteoarthritis  - previously with features of trochanteric bursa syndrome, resolved  - Discussed and recommended exercise (jacques non wt-bearing/swimming)  - range of motion, flexibility, and strengthening exercises PRN  - Acetaminophen prn up to 3 g per day.  - Topicals therapy: Capsaicin  - consider for corticosteroid injections    2. Osteoporosis  - two isolated episodes of fragility  fracture distal radius and ulna likely represent osteoporotic fracture.  - chronic cyclosporin use, osteopenia, hyperparathyroidism, CKD IV  - received Prolia on September, will repeat in 6 months.  - adequate dietary calcium and vitamin D intake.  - Avoid immobility, fall prevention   - Comprehensive metabolic panel prior next visit    3. Other specified counseling  - over 10 minutes spent regarding below topics:  - Immunization counseling done.  - Nutrition and exercise counseling.  - Limitation of alcohol consumption.  - Regular exercise:  Aerobic and resistance.  - Medication counseling provided.    Follow up in about 4 months (around 3/29/2020).    Method of contact with patient concerns: Ana Maria marina Rheumatology    Disclaimer:  This note is prepared using voice recognition software and as such is likely to have errors and has not been proof read. Please contact me for questions.     Time spent: 25 minutes in face to face discussion concerning diagnosis, prognosis, review of lab and test results, benefits of treatment as well as management of disease, counseling of patient and coordination of care between various health care providers.  Greater than half the time spent was used for coordination of care and counseling of patient.    Prasanth Johnson M.D.  Rheumatology Department   [facility] [place]

## 2019-12-09 DIAGNOSIS — M79.7 FIBROMYALGIA: ICD-10-CM

## 2019-12-09 RX ORDER — PREGABALIN 50 MG/1
50 CAPSULE ORAL 2 TIMES DAILY
Qty: 60 CAPSULE | Refills: 1 | Status: SHIPPED | OUTPATIENT
Start: 2019-12-09 | End: 2019-12-09 | Stop reason: SDUPTHER

## 2019-12-09 NOTE — TELEPHONE ENCOUNTER
----- Message from Mira Canales sent at 12/9/2019  1:24 PM CST -----  ..Ty..Type:  RX Refill Request    Who Called: pt   Refill or New Rx:refill   RX Name and Strength: lyrica 50 mg   How is the patient currently taking it? (ex. 1XDay):  Is this a 30 day or 90 day RX: 30  Preferred Pharmacy with phone number: .  24    [facility]/pharm[facility] #[id number] - [place] - [address] [facility] AT [address]  [place] [address]  Phone: [phone] Fax: [phone]    Local or Mail Order:local   Ordering Provider Dr Cassidy   Would the patient rather a call back or a response via [facility]? Call back   Best Call Back Number: [phone]  Additional Information:

## 2019-12-09 NOTE — TELEPHONE ENCOUNTER
Called the patient to advise that prescription was sent to the pharmacy.  Patient verbally understood.

## 2019-12-10 RX ORDER — PREGABALIN 50 MG/1
CAPSULE ORAL
Qty: 60 CAPSULE | Refills: 4 | Status: SHIPPED | OUTPATIENT
Start: 2019-12-10 | End: 2020-05-08 | Stop reason: SDUPTHER

## 2019-12-10 NOTE — TELEPHONE ENCOUNTER
----- Message from Deysi Mora sent at 12/10/2019 10:12 AM CST -----  Type:  RX Refill Request    Who Called:  Pt  Nadia                                                                                                                   Refill or New Rx refill  RX Name and Strength:    Lyrica 50mg  How is the patient currently taking it? (ex. 1XDay):  Takes 1 tab twice daily  Is this a 30 day or 90 day RX:  30 day  Preferred Pharmacy with phone number:  [facility]  Local or Mail Order: Local  Ordering Provider:  Dr Cassidy  Would the patient rather a call back or a response via [facility]?   Call back  Best Call Back Number:  [phone]  Additional Information: Pt states she had left a message yesterday//the pharmacy has not received it///she has been out  For 3 days now//Please call when sent in//emily/maurilio

## 2019-12-26 ENCOUNTER — OFFICE VISIT (OUTPATIENT)
Dept: INTERNAL MEDICINE | Facility: CLINIC | Age: 64
End: 2019-12-26
Payer: MEDICARE

## 2019-12-26 VITALS
WEIGHT: 165.88 LBS | HEIGHT: 62 IN | OXYGEN SATURATION: 98 % | HEART RATE: 103 BPM | DIASTOLIC BLOOD PRESSURE: 75 MMHG | BODY MASS INDEX: 30.52 KG/M2 | SYSTOLIC BLOOD PRESSURE: 137 MMHG | TEMPERATURE: 98 F

## 2019-12-26 DIAGNOSIS — I10 ESSENTIAL HYPERTENSION: ICD-10-CM

## 2019-12-26 DIAGNOSIS — R05.9 COUGH: Primary | ICD-10-CM

## 2019-12-26 DIAGNOSIS — D84.9 IMMUNOCOMPROMISED PATIENT: ICD-10-CM

## 2019-12-26 DIAGNOSIS — Z94.1 HEART TRANSPLANTED: Chronic | ICD-10-CM

## 2019-12-26 DIAGNOSIS — R60.9 EDEMA, UNSPECIFIED TYPE: ICD-10-CM

## 2019-12-26 PROCEDURE — 99999 PR PBB SHADOW E&M-EST. PATIENT-LVL III: CPT | Mod: PBBFAC,HCNC,, | Performed by: FAMILY MEDICINE

## 2019-12-26 PROCEDURE — 3078F DIAST BP <80 MM HG: CPT | Mod: HCNC,CPTII,S$GLB, | Performed by: FAMILY MEDICINE

## 2019-12-26 PROCEDURE — 3075F SYST BP GE 130 - 139MM HG: CPT | Mod: HCNC,CPTII,S$GLB, | Performed by: FAMILY MEDICINE

## 2019-12-26 PROCEDURE — 99213 OFFICE O/P EST LOW 20 MIN: CPT | Mod: HCNC,S$GLB,, | Performed by: FAMILY MEDICINE

## 2019-12-26 PROCEDURE — 99499 UNLISTED E&M SERVICE: CPT | Mod: HCNC,S$GLB,, | Performed by: FAMILY MEDICINE

## 2019-12-26 PROCEDURE — 99213 PR OFFICE/OUTPT VISIT, EST, LEVL III, 20-29 MIN: ICD-10-PCS | Mod: HCNC,S$GLB,, | Performed by: FAMILY MEDICINE

## 2019-12-26 PROCEDURE — 99499 RISK ADDL DX/OHS AUDIT: ICD-10-PCS | Mod: HCNC,S$GLB,, | Performed by: FAMILY MEDICINE

## 2019-12-26 PROCEDURE — 3008F PR BODY MASS INDEX (BMI) DOCUMENTED: ICD-10-PCS | Mod: HCNC,CPTII,S$GLB, | Performed by: FAMILY MEDICINE

## 2019-12-26 PROCEDURE — 3008F BODY MASS INDEX DOCD: CPT | Mod: HCNC,CPTII,S$GLB, | Performed by: FAMILY MEDICINE

## 2019-12-26 PROCEDURE — 3075F PR MOST RECENT SYSTOLIC BLOOD PRESS GE 130-139MM HG: ICD-10-PCS | Mod: HCNC,CPTII,S$GLB, | Performed by: FAMILY MEDICINE

## 2019-12-26 PROCEDURE — 99999 PR PBB SHADOW E&M-EST. PATIENT-LVL III: ICD-10-PCS | Mod: PBBFAC,HCNC,, | Performed by: FAMILY MEDICINE

## 2019-12-26 PROCEDURE — 1101F PT FALLS ASSESS-DOCD LE1/YR: CPT | Mod: HCNC,CPTII,S$GLB, | Performed by: FAMILY MEDICINE

## 2019-12-26 PROCEDURE — 1101F PR PT FALLS ASSESS DOC 0-1 FALLS W/OUT INJ PAST YR: ICD-10-PCS | Mod: HCNC,CPTII,S$GLB, | Performed by: FAMILY MEDICINE

## 2019-12-26 PROCEDURE — 3078F PR MOST RECENT DIASTOLIC BLOOD PRESSURE < 80 MM HG: ICD-10-PCS | Mod: HCNC,CPTII,S$GLB, | Performed by: FAMILY MEDICINE

## 2019-12-26 NOTE — PROGRESS NOTES
"Subjective:       Patient ID: Nadia Damon is a 64 y.o. female.    Chief Complaint: Follow-up    HPI  Was seem in October for cough  She received flu vaccine and cxr  Concerns for hemoptysis but cxr wnl  Continues to experience hemoptysis occasionally  Clears up after one cough but worse in AM, occasionally in middle of day  Denies fever, sick contacts, tb exposure  Denies recent travel outside country    Drinking 2L but has leg swelling  Worse with standing, better with elevating  Now taking lasix    Review of Systems   Constitutional: Negative for fever.   HENT: Positive for congestion and voice change. Negative for rhinorrhea.    Respiratory: Positive for cough.    Cardiovascular: Positive for leg swelling.        Objective:   /75 (BP Location: Right arm, Patient Position: Sitting, BP Method: Large (Automatic))   Pulse 103   Temp 98.3 °F (36.8 °C) (Oral)   Ht 5' 2" (1.575 m)   Wt 75.3 kg (165 lb 14.3 oz)   LMP 06/20/1983 (Within Years)   SpO2 98%   BMI 30.34 kg/m²     BP Readings from Last 3 Encounters:   12/26/19 137/75   11/29/19 130/89   10/29/19 118/74       Lab Results   Component Value Date    HGBA1C 5.6 07/14/2015       Physical Exam   Constitutional: She is oriented to person, place, and time. She appears well-nourished. No distress.   HENT:   Head: Normocephalic and atraumatic.   Mouth/Throat: Oropharynx is clear and moist.   Eyes: Conjunctivae and EOM are normal. No scleral icterus.   Neck: Normal range of motion. Neck supple.   Cardiovascular: Normal rate, regular rhythm and normal heart sounds.   No murmur heard.  Pulmonary/Chest: Effort normal and breath sounds normal. No respiratory distress. She has no wheezes. She has no rales.   Abdominal: Soft. Bowel sounds are normal. There is no tenderness.   Musculoskeletal: She exhibits edema (2+ pitting edema b/l le). She exhibits no deformity.   Neurological: She is alert and oriented to person, place, and time.   Skin: Skin is warm and " dry.   Anterior chest scar   Psychiatric: She has a normal mood and affect. Her behavior is normal.   Vitals reviewed.    Assessment:     1. Heart transplanted    2. Immunocompromised patient    3. Essential hypertension    4. Edema, unspecified type      Plan:     Problem List Items Addressed This Visit        Cardiac/Vascular    Heart transplanted - Primary (Chronic)    Overview     5/93          Essential hypertension       Immunology/Multi System    Immunocompromised patient    Overview     On anti rejection drugs            Other    Edema          Follow up if symptoms worsen or fail to improve.

## 2019-12-26 NOTE — PATIENT INSTRUCTIONS
Guaifenesin oral solution and syrup  What is this medicine?  GUAIFENESIN (gwye FEN e sin) is an expectorant. It helps to thin mucous and make coughs more productive. This medicine is used to treat coughs caused by colds or the flu. It is not intended to treat chronic cough caused by smoking, asthma, emphysema, or heart failure.  How should I use this medicine?  Take this medicine by mouth. Follow the directions on the prescription label. Use a specially marked spoon or container to measure your dose. Household spoons are not accurate. Take your medicine at regular intervals. Do not take it more often than directed.  Talk to your pediatrician regarding the use of this medicine in children. Special care may be needed.  What side effects may I notice from receiving this medicine?  Side effects that you should report to your doctor or health care professional as soon as possible:  · allergic reactions like skin rash, itching or hives, swelling of the face, lips, or tongue  Side effects that usually do not require medical attention (report to your doctor or health care professional if they continue or are bothersome):  · dizziness  · headache  · stomach upset  What may interact with this medicine?  Interactions are not expected.  What if I miss a dose?  If you miss a dose, take it as soon as you can. If it is almost time for your next dose, take only that dose. Do not take double or extra doses.  Where should I keep my medicine?  Keep out of the reach of children.  Store at room temperature between 20 and 25 degrees C (68 and 77 degrees F). Do not freeze. Keep container tightly closed. Throw away any unused medicine after the expiration date.  What should I tell my health care provider before I take this medicine?  They need to know if you have any of these conditions:  · diabetes  · fever  · kidney disease  · an unusual or allergic reaction to guaifenesin, other medicines, foods, dyes, or preservatives  · pregnant or  trying to get pregnant  · breast-feeding  What should I watch for while using this medicine?  Do not treat a cough for more than 1 week without consulting your doctor or health care professional. If you also have a high fever, skin rash, continuing headache, or sore throat, see your doctor.  For best results, drink 6 to 8 glasses water daily while you are taking this medicine.  NOTE:This sheet is a summary. It may not cover all possible information. If you have questions about this medicine, talk to your doctor, pharmacist, or health care provider. Copyright© 2017 [employer]

## 2019-12-31 ENCOUNTER — TELEPHONE (OUTPATIENT)
Dept: INTERNAL MEDICINE | Facility: CLINIC | Age: 64
End: 2019-12-31

## 2019-12-31 NOTE — TELEPHONE ENCOUNTER
----- Message from Deysi Mora sent at 12/31/2019  8:52 AM CST -----  Type:  Needs Medical Advice    Who Called:   Pt Nadia                                                                                                       Symptoms (please be specific):  Cold/chest congestion  How long has patient had these symptoms:     Pharmacy name and phone #:     Would the patient rather a call back or a response via [facility]?    Call back  Best Call Back Number:    [phone]  Additional Information:  Pt states she had an appt with Dr Paz on 12/26/19//she was told to get med//Guaisenesin(is this med a generic) oral solution//[facility] has the pills but not the oral solution//the cost is $12.99//is wanting to know if ok to get the pills//please call to discuss//emily/jennie

## 2020-01-03 ENCOUNTER — PES CALL (OUTPATIENT)
Dept: ADMINISTRATIVE | Facility: CLINIC | Age: 65
End: 2020-01-03

## 2020-01-10 ENCOUNTER — LAB VISIT (OUTPATIENT)
Dept: LAB | Facility: HOSPITAL | Age: 65
End: 2020-01-10
Attending: INTERNAL MEDICINE
Payer: MEDICARE

## 2020-01-10 DIAGNOSIS — E83.52 HYPERCALCEMIA: ICD-10-CM

## 2020-01-10 LAB
ALBUMIN SERPL BCP-MCNC: 3.3 G/DL (ref 3.5–5.2)
ANION GAP SERPL CALC-SCNC: 10 MMOL/L (ref 8–16)
BUN SERPL-MCNC: 22 MG/DL (ref 8–23)
CALCIUM SERPL-MCNC: 9.1 MG/DL (ref 8.7–10.5)
CHLORIDE SERPL-SCNC: 106 MMOL/L (ref 95–110)
CO2 SERPL-SCNC: 24 MMOL/L (ref 23–29)
CREAT SERPL-MCNC: 1.6 MG/DL (ref 0.5–1.4)
EST. GFR  (AFRICAN AMERICAN): 38.7 ML/MIN/1.73 M^2
EST. GFR  (NON AFRICAN AMERICAN): 33.6 ML/MIN/1.73 M^2
GLUCOSE SERPL-MCNC: 85 MG/DL (ref 70–110)
PHOSPHATE SERPL-MCNC: 4.1 MG/DL (ref 2.7–4.5)
POTASSIUM SERPL-SCNC: 5.4 MMOL/L (ref 3.5–5.1)
PTH-INTACT SERPL-MCNC: 312 PG/ML (ref 9–77)
SODIUM SERPL-SCNC: 140 MMOL/L (ref 136–145)

## 2020-01-10 PROCEDURE — 83970 ASSAY OF PARATHORMONE: CPT | Mod: HCNC

## 2020-01-10 PROCEDURE — 36415 COLL VENOUS BLD VENIPUNCTURE: CPT | Mod: HCNC

## 2020-01-10 PROCEDURE — 80069 RENAL FUNCTION PANEL: CPT | Mod: HCNC

## 2020-01-17 ENCOUNTER — OFFICE VISIT (OUTPATIENT)
Dept: NEPHROLOGY | Facility: CLINIC | Age: 65
End: 2020-01-17
Payer: MEDICARE

## 2020-01-17 VITALS
DIASTOLIC BLOOD PRESSURE: 86 MMHG | HEART RATE: 80 BPM | SYSTOLIC BLOOD PRESSURE: 127 MMHG | WEIGHT: 166.44 LBS | BODY MASS INDEX: 30.63 KG/M2 | HEIGHT: 62 IN

## 2020-01-17 DIAGNOSIS — T45.1X5A NEPHROTOXICITY DUE TO CALCINEURIN INHIBITOR THERAPY OF TRANSPLANTED KIDNEY: ICD-10-CM

## 2020-01-17 DIAGNOSIS — N14.4 NEPHROTOXICITY DUE TO CALCINEURIN INHIBITOR THERAPY OF TRANSPLANTED KIDNEY: ICD-10-CM

## 2020-01-17 DIAGNOSIS — R80.9 PROTEINURIA, UNSPECIFIED TYPE: ICD-10-CM

## 2020-01-17 DIAGNOSIS — T86.19 NEPHROTOXICITY DUE TO CALCINEURIN INHIBITOR THERAPY OF TRANSPLANTED KIDNEY: ICD-10-CM

## 2020-01-17 DIAGNOSIS — N20.0 NEPHROLITHIASIS: ICD-10-CM

## 2020-01-17 DIAGNOSIS — N18.30 STAGE 3 CHRONIC KIDNEY DISEASE: ICD-10-CM

## 2020-01-17 DIAGNOSIS — E87.5 HYPERKALEMIA: ICD-10-CM

## 2020-01-17 DIAGNOSIS — E21.0 PRIMARY HYPERPARATHYROIDISM: ICD-10-CM

## 2020-01-17 DIAGNOSIS — E83.52 HYPERCALCEMIA: Primary | ICD-10-CM

## 2020-01-17 DIAGNOSIS — E88.09 HYPOALBUMINEMIA: ICD-10-CM

## 2020-01-17 DIAGNOSIS — Z94.1 HEART TRANSPLANTED: ICD-10-CM

## 2020-01-17 DIAGNOSIS — I10 ESSENTIAL HYPERTENSION: ICD-10-CM

## 2020-01-17 PROCEDURE — 99999 PR PBB SHADOW E&M-EST. PATIENT-LVL III: CPT | Mod: PBBFAC,HCNC,, | Performed by: INTERNAL MEDICINE

## 2020-01-17 PROCEDURE — 99499 UNLISTED E&M SERVICE: CPT | Mod: HCNC,S$GLB,, | Performed by: INTERNAL MEDICINE

## 2020-01-17 PROCEDURE — 3079F DIAST BP 80-89 MM HG: CPT | Mod: HCNC,CPTII,S$GLB, | Performed by: INTERNAL MEDICINE

## 2020-01-17 PROCEDURE — 1101F PR PT FALLS ASSESS DOC 0-1 FALLS W/OUT INJ PAST YR: ICD-10-PCS | Mod: HCNC,CPTII,S$GLB, | Performed by: INTERNAL MEDICINE

## 2020-01-17 PROCEDURE — 3074F PR MOST RECENT SYSTOLIC BLOOD PRESSURE < 130 MM HG: ICD-10-PCS | Mod: HCNC,CPTII,S$GLB, | Performed by: INTERNAL MEDICINE

## 2020-01-17 PROCEDURE — 99499 RISK ADDL DX/OHS AUDIT: ICD-10-PCS | Mod: HCNC,S$GLB,, | Performed by: INTERNAL MEDICINE

## 2020-01-17 PROCEDURE — 3008F PR BODY MASS INDEX (BMI) DOCUMENTED: ICD-10-PCS | Mod: HCNC,CPTII,S$GLB, | Performed by: INTERNAL MEDICINE

## 2020-01-17 PROCEDURE — 99215 OFFICE O/P EST HI 40 MIN: CPT | Mod: HCNC,S$GLB,, | Performed by: INTERNAL MEDICINE

## 2020-01-17 PROCEDURE — 3074F SYST BP LT 130 MM HG: CPT | Mod: HCNC,CPTII,S$GLB, | Performed by: INTERNAL MEDICINE

## 2020-01-17 PROCEDURE — 99999 PR PBB SHADOW E&M-EST. PATIENT-LVL III: ICD-10-PCS | Mod: PBBFAC,HCNC,, | Performed by: INTERNAL MEDICINE

## 2020-01-17 PROCEDURE — 3008F BODY MASS INDEX DOCD: CPT | Mod: HCNC,CPTII,S$GLB, | Performed by: INTERNAL MEDICINE

## 2020-01-17 PROCEDURE — 3079F PR MOST RECENT DIASTOLIC BLOOD PRESSURE 80-89 MM HG: ICD-10-PCS | Mod: HCNC,CPTII,S$GLB, | Performed by: INTERNAL MEDICINE

## 2020-01-17 PROCEDURE — 99215 PR OFFICE/OUTPT VISIT, EST, LEVL V, 40-54 MIN: ICD-10-PCS | Mod: HCNC,S$GLB,, | Performed by: INTERNAL MEDICINE

## 2020-01-17 PROCEDURE — 1101F PT FALLS ASSESS-DOCD LE1/YR: CPT | Mod: HCNC,CPTII,S$GLB, | Performed by: INTERNAL MEDICINE

## 2020-01-17 NOTE — PROGRESS NOTES
NEPHROLOGY CLINIC FOLLOWUP NOTE  Date of clinic visit: 1/17/20     REASON FOR FOLLOWUP AND CHIEF COMPLAINT: nephrolithiasis, hypercalcemia, and history of chronic kidney disease post-heart transplant.     HISTORY OF PRESENT ILLNESS: Ms. Damon is a 64-year-old AA female with a history of CKD stage 3, nephrolithiasis (likely mixed stones), and heart transplant in 1993 (is on cyclosporine), who presents for f/u. Pt was last seen by us in Sept 2019. Pt has had mild, persistent hypercalcemia and increase in iPTH. Primary hyperparathyroidism (HPT) is suspected. Sestamibi nuclear scan of the neck did not show any adenomas. On her last visit, a very low dose of PO lasix (10) mg was added to control s Ca. On f/u today, pt has no acute or new c/o's. Pt reports no new kidney stones. No discomfort today. Pt has followed medical advice regarding avoiding foods that are risk factor for kidney stones. HCTZ was stopped already due to low BP and the Ca issue. Her risk factors for kidney stones include primary hyperparathyroidism (HPT) and being on cyclosporine for prevention of heart transplant rejection, which causes hyperuricemia. Pt has a h/o of osteopenia.    In addition, noted today that pt has had chronically mild low serum albumin. She has no volume overload/CHF, or liver disease. A review of the records showed that s alb has been low at least for 9 years. Noted that she also has mild proteinuria (about 500 mg). She has no leg swelling or SOB.        To review:   Pt previously developed right sided back pain, thought to be related to kidney stones identified on renal u/s. The stones in the R kidney were non-obstructive but were relatively large at 10 and 12 mm each. Based on h/o of taking cyclosporine to prevent heart transplant rejection and eating a lot of sea food (shrimp and crawfish), uric acid stones were suspected. Pt had additional risk factors for non-uric acid stones, including taking Vit C 1 g/day and Vit  D. After dietary modification, the flank pain resolved. A f/u CT scan did not show any stones. It is possible that she passed the 2 stones, as uric acid stones are soluble on urinary alkalinization and can pass by themselves. Pt was advised to stop Vit C and Vit D, which she has. She was also advised on drinking freshly squeezed lemon juice and follow dietary recommendations to avoid nephrolithiasis risk factors. Pt was referred also to urology. A repeat CT scan did not show any stones. Pt obtained a 24 hour urine collection for kidney stone risk stratification which showed mild hypocitraturia.         To review:  Pt has CKD       PAST MEDICAL HISTORY:  1. CKD stage III. 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 Hypertension.  3. Heart transplant for cardiomyopathy in 1993, the patient has been on chronic  cyclosporine treatment.  4. Carpal tunnel syndrome.  5. Fibromyalgia.  6. GERD.  7. Bell's palsy.  8. Depression.     PAST SURGICAL HISTORY: Reviewed as above, unchanged.     MEDICATIONS: Reviewed     Current Outpatient Medications:     acetaminophen 500 mg/15 mL Liqd, , Disp: , Rfl:     ALPRAZolam (XANAX) 0.5 MG tablet, TAKE 1 TABLET (0.5 MG TOTAL) THREE TIMES DAILY AS NEEDED FOR ANXIETY, Disp: 90 tablet, Rfl: 0    amLODIPine (NORVASC) 2.5 MG tablet, Take 1 tablet (2.5 mg total) by mouth once daily., Disp: 30 tablet, Rfl: 11    ammonium lactate (AMLACTIN) 12 % lotion, Use daily.  Apply to damp skin after bathing., Disp: 225 g, Rfl: 11    aspirin (ECOTRIN) 81 MG EC tablet, Take 1 tablet (81 mg total) by mouth once daily., Disp: 30 tablet, Rfl: 11    baclofen (LIORESAL) 10 MG tablet, TAKE 1 TABLET THREE  TIMES DAILY AS NEEDED FOR MUSCLE SPASM(S)., Disp: 270 tablet, Rfl: 0    benzocaine-menthol 15-2.6 mg Lozg, , Disp: , Rfl:     biotin 10,000 mcg Cap, Take 1 tablet by mouth once daily., Disp: , Rfl:     busPIRone (BUSPAR) 10 MG tablet,  TAKE 1 TABLET EVERY DAY, Disp: 90 tablet, Rfl: 1    clobetasol (TEMOVATE) 0.05 % external solution, Apply topically 2 (two) times daily. Apply to the scalp., Disp: 50 mL, Rfl: 2    cycloSPORINE modified, NEORAL, (NEORAL) 100 MG capsule, TAKE 1 CAPSULE EVERY DAY, Disp: 90 capsule, Rfl: 0    cycloSPORINE modified, NEORAL, (NEORAL) 25 MG capsule, TAKE 3 CAPSULES ONCE A DAY, Disp: 270 capsule, Rfl: 0    DULoxetine (CYMBALTA) 30 MG capsule, TAKE 1 CAPSULE EVERY DAY, Disp: 90 capsule, Rfl: 1    estradiol (ESTRACE) 0.01 % (0.1 mg/gram) vaginal cream, Place 1 g vaginally twice a week., Disp: 1 Tube, Rfl: 12    EVENING PRIMROSE OIL ORAL, Take 1,000 mg by mouth once daily., Disp: , Rfl:     ferrous sulfate (FEOSOL) 325 mg (65 mg iron) Tab tablet, Take 1 tablet (325 mg total) by mouth once daily., Disp: 100 tablet, Rfl: 3    fluticasone (FLONASE) 50 mcg/actuation nasal spray, 2 sprays by Each Nare route once daily. (Patient taking differently: 2 sprays by Each Nare route daily as needed. ), Disp: 1 Bottle, Rfl: 0    furosemide (LASIX) 20 MG tablet, Take 1/2 (10 mg) po qd, Disp: 30 tablet, Rfl: 11    ketoconazole (NIZORAL) 2 % shampoo, Apply topically 3 (three) times a week., Disp: 120 mL, Rfl: 5    leg brace (ANKLE SUPPORT MISC), , Disp: , Rfl:     metoprolol succinate (TOPROL-XL) 25 MG 24 hr tablet, TAKE 1 TABLET EVERY DAY, Disp: 90 tablet, Rfl: 3    multivitamin capsule, Take 1 capsule by mouth once daily., Disp: , Rfl:     omeprazole (PRILOSEC) 10 MG capsule, TAKE 1 CAPSULE EVERY DAY, Disp: 90 capsule, Rfl: 0    pregabalin (LYRICA) 50 MG capsule, TAKE 1 CAPSULE BY MOUTH TWICE DAILY, Disp: 60 capsule, Rfl: 4    temazepam (RESTORIL) 30 mg capsule, Take 1 capsule (30 mg total) by mouth nightly., Disp: 30 capsule, Rfl: 5    vitamin E 400 UNIT capsule, Take 400 Units by mouth once daily., Disp: , Rfl:     zolpidem (AMBIEN) 10 mg Tab, TAKE 1 TABLET BY MOUTH ONCE DAILY IN THE EVENING, Disp: 90 tablet, Rfl: 1     mometasone (ELOCON) 0.1 % lotion, Apply topically once daily., Disp: 180 mL, Rfl: 3    Current Facility-Administered Medications:     denosumab (PROLIA) injection 60 mg, 60 mg, Subcutaneous, Q6 Months, Prasanth Johnson MD    triamcinolone acetonide injection 10 mg, 10 mg, Intradermal, 1 time in Clinic/HOD, Jose Roland MD     SOCIAL HISTORY: Reviewed. Negative for smoking. No alcohol use.      REVIEW OF SYSTEMS: No recent hospitalizations.  GENERAL: Negative.  HEAD, EYES, EARS, NOSE, AND THROAT: Negative.  CARDIAC: Negative.  PULMONARY: Negative.  GASTROINTESTINAL: Negative.  GENITOURINARY: Negative.  PSYCHOLOGICAL: Negative.  NEUROLOGICAL: Negative.  ENDOCRINE: Negative.  HEMATOLOGIC AND ONCOLOGIC: Negative.  INFECTIOUS DISEASE: Negative.  The rest of the review of systems negative.     PHYSICAL EXAMINATION:  VITAL SIGNS: Repeat blood pressure is 127/80. Pulse is 80, weight is 75.5 Kg, from 72.8 Kg  GENERAL: She is cooperative, pleasant, in no acute distress.   Speech and thought process appropriate, normal.  HEENT: Mucous membranes moist.  NECK: Neck JVD. Normal hearing.  ABDOMEN: Soft, nontender.  Side and back: mid back, non tender, no sign of trauma  EXTREMITIES: no edema.     LABS: Reviewed.  BMP  Lab Results   Component Value Date     01/10/2020    K 5.4 (H) 01/10/2020     01/10/2020    CO2 24 01/10/2020    BUN 22 01/10/2020    CREATININE 1.6 (H) 01/10/2020    CALCIUM 9.1 01/10/2020    ANIONGAP 10 01/10/2020    ESTGFRAFRICA 38.7 (A) 01/10/2020    EGFRNONAA 33.6 (A) 01/10/2020     Lab Results   Component Value Date    WBC 2.84 (L) 06/10/2019    HGB 12.1 06/10/2019    HCT 39.3 06/10/2019    MCV 91 06/10/2019     06/10/2019       Lab Results   Component Value Date    .0 (H) 01/10/2020    CALCIUM 9.1 01/10/2020    CAION 1.13 09/05/2018    PHOS 4.1 01/10/2020            To review:   24 hour urine: Ca not measured, uric acid 138, Oxalate 20, citrate 203  U/a unremarkable  Urine Cx:  neg   Urine P/Cr 740 mg     KUB: unremarkable, except for some constipation     Renal u/s: FINDINGS:  Right kidney: The right kidney measures 9.6 cm. Mild cortical thinning and increased cortical echogenicity.  10 and 12 mm stones are seen in the upper and lower poles respectively.  No cortical thinning. No loss of corticomedullary distinction. No mass. No renal stone. No hydronephrosis.  Left kidney: The left kidney measures 9.3 cm. Mild cortical thinning and increased cortical echogenicity. No loss of corticomedullary distinction. No mass. No renal stone. No hydronephrosis.  The bladder is partially distended at the time of scanning and has an unremarkable appearance.     CT abd: The left kidney appears slightly small.  Right kidney is grossly normal in size.  No obvious hydronephrosis or nephrolithiasis     Sestamibi nuclear scan of the neck: 8/29/19: no adenomas              ASSESSMENT AND PLAN: This is a 64-year-old female who presents for follow up of hypercalcemia and suspected primary HPT. Pt has a h/o of nephrolithiasis and CKD   Patient has a h/o of heart transplant  The impression is as follows:     1. Nephrolithiasis: no new stones.  Has multiple risk factors for kidney stones: (hyperuricemia from cyclosporine, diet rich in uric acid, previously taking Vit C, vit D, and calcium,, CKD, and having primary hyperparathyroidism)  Stones are likely mixed ca oxalate and uric acid kidney stones  No longer taking Vit C  No longer takes Vit D and Ca.   Mild hyperuricemia, from cyclosporine.  Mild persistent hypercalcemia (corrected ca for low albumin is slightly elevated)     F/u CT did not show any stones  U/s showed non-obstructive 2 R stones, relatively large  May have passed the stones     24 hour urine for kidney stone stratification shows hypocitraturia  Pt was advised NOT TO STOP cyclosporine.     Pt has been advised of dietary risk factors for kidney stones, was given printed literature  Advised pt  to:  -keep water intake to >2 L/day  -keep salt in diet to <3 g/day  -reduce meat intake, specially seafood  -keep Ca intake average  -NoVit C  -No Vit D? Now has osteopenia.     2. Renal. s Cr overall stable and unchanged, within the prior baseline range  s Cr has fluctuated somewhat due to intermittent hypercalcemia  GRACE on CKD stage 3  CKD suspect due to calcineurin inhibitor toxicity (no prior kidney biopsies, however)  Mild hyperkalemia noted today: pt was given printed information to avoid high K foods     3. Proteinuria: and mild hypoalbulinemia.  Reason not clear  Reviewed with pt  May be due to CSA renal toxicity (calcineurin inhibitor nephrotoxicity)  Would consider an ACE-I or ARB to treat, but K is borderline high    4. HTN: BP controlled  Reviewed meds with pt  Please note that HCTZ can be used for stone prevention (is hypocalciuric), HCTZ was stopped for low BP and the elevated Ca issues     5. History of heart transplant on cyclosporine. 26 years ago  Per heart transplant team.      6. Elevated iPTH, likely has primary hyperparathyroidism (HPT)  No adenomas found on sestamibi nulcear scan  May have diffuse hyperplasia  Primary HPT can explain increased risk of kidney stones     7. Leukopenia noted: mild.  Likely due to CSA.  Advised pt to follow up with heart transplant clinic at [facility]-[facility]        PLANS AND RECOMMENDATIONS:   As discussed above  Opportunity for questions provided  Complicated case, multiple issues adderssed  Continue lasix 10 mg po qd   RTC 4-6 months  Total time spent 40 minutes. Extensive time spent including the time to review the records, the patient evaluation, documentation, face-to-face  discussion with the patient. More than 50% of the time was spent in counseling  and coordination of care. Level V visit.     Carter Crawford MD

## 2020-01-27 DIAGNOSIS — M62.838 MUSCLE SPASMS OF BOTH LOWER EXTREMITIES: ICD-10-CM

## 2020-01-27 DIAGNOSIS — M79.7 FIBROMYALGIA: ICD-10-CM

## 2020-01-27 DIAGNOSIS — Z94.1 HEART TRANSPLANTED: Chronic | ICD-10-CM

## 2020-01-28 RX ORDER — BACLOFEN 10 MG/1
TABLET ORAL
Qty: 270 TABLET | Refills: 0 | Status: SHIPPED | OUTPATIENT
Start: 2020-01-28 | End: 2020-03-31

## 2020-01-28 RX ORDER — DULOXETIN HYDROCHLORIDE 30 MG/1
CAPSULE, DELAYED RELEASE ORAL
Qty: 90 CAPSULE | Refills: 1 | Status: SHIPPED | OUTPATIENT
Start: 2020-01-28 | End: 2020-08-10

## 2020-01-28 RX ORDER — OMEPRAZOLE 10 MG/1
CAPSULE, DELAYED RELEASE ORAL
Qty: 90 CAPSULE | Refills: 0 | Status: SHIPPED | OUTPATIENT
Start: 2020-01-28 | End: 2020-03-31

## 2020-01-28 RX ORDER — CYCLOSPORINE 100 MG/1
CAPSULE, LIQUID FILLED ORAL
Qty: 90 CAPSULE | Refills: 0 | Status: SHIPPED | OUTPATIENT
Start: 2020-01-28 | End: 2020-03-31

## 2020-01-28 RX ORDER — BUSPIRONE HYDROCHLORIDE 10 MG/1
TABLET ORAL
Qty: 90 TABLET | Refills: 1 | Status: SHIPPED | OUTPATIENT
Start: 2020-01-28 | End: 2020-08-10

## 2020-01-28 RX ORDER — CYCLOSPORINE 25 MG/1
CAPSULE, LIQUID FILLED ORAL
Qty: 270 CAPSULE | Refills: 0 | Status: SHIPPED | OUTPATIENT
Start: 2020-01-28 | End: 2020-03-31

## 2020-02-11 ENCOUNTER — TELEPHONE (OUTPATIENT)
Dept: INTERNAL MEDICINE | Facility: CLINIC | Age: 65
End: 2020-02-11

## 2020-02-11 NOTE — TELEPHONE ENCOUNTER
----- Message from Priscilla Camp sent at 2/11/2020  9:11 AM CST -----  Contact: pt   Type:  Needs Medical Advice    Who Called: TICO LIANG   Symptoms (please be specific):  How long has patient had these symptoms:  Pharmacy name and phone #:   Would the patient rather a call back or a response via [facility]? Call   Best Call Back Number:  [phone] (home)    Additional Information:   Pt is requesting a call back from the nurse in regards to the pt needing to have a updated  copy of her med list today please the pt is needing this by 10 am.

## 2020-02-14 ENCOUNTER — HOSPITAL ENCOUNTER (EMERGENCY)
Facility: HOSPITAL | Age: 65
Discharge: HOME | End: 2020-02-14
Attending: EMERGENCY MEDICINE
Payer: MEDICARE

## 2020-02-14 VITALS
HEART RATE: 88 BPM | OXYGEN SATURATION: 97 % | DIASTOLIC BLOOD PRESSURE: 80 MMHG | SYSTOLIC BLOOD PRESSURE: 136 MMHG | TEMPERATURE: 99 F | RESPIRATION RATE: 15 BRPM

## 2020-02-14 DIAGNOSIS — Z04.1 EXAM FOLLOWING MVC (MOTOR VEHICLE COLLISION), NO APPARENT INJURY: ICD-10-CM
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
## 2020-02-14 DIAGNOSIS — R07.9 CHEST PAIN: ICD-10-CM

## 2020-02-14 DIAGNOSIS — F41.9 CHRONIC ANXIETY: ICD-10-CM

## 2020-02-14 DIAGNOSIS — F41.9 ANXIOUSNESS: ICD-10-CM

## 2020-02-14 PROCEDURE — 99284 EMERGENCY DEPT VISIT MOD MDM: CPT | Mod: 25,HCNC

## 2020-02-14 PROCEDURE — 25000003 PHARM REV CODE 250: Mod: HCNC | Performed by: EMERGENCY MEDICINE

## 2020-02-14 PROCEDURE — 93010 ELECTROCARDIOGRAM REPORT: CPT | Mod: HCNC,,, | Performed by: INTERNAL MEDICINE

## 2020-02-14 PROCEDURE — 93010 EKG 12-LEAD: ICD-10-PCS | Mod: HCNC,,, | Performed by: INTERNAL MEDICINE

## 2020-02-14 PROCEDURE — 93005 ELECTROCARDIOGRAM TRACING: CPT | Mod: HCNC

## 2020-02-14 RX ORDER — ACETAMINOPHEN 325 MG/1
650 TABLET ORAL
Status: COMPLETED | OUTPATIENT
Start: 2020-02-14 | End: 2020-02-14

## 2020-02-14 RX ORDER — ALPRAZOLAM 0.25 MG/1
0.25 TABLET ORAL
Status: COMPLETED | OUTPATIENT
Start: 2020-02-14 | End: 2020-02-14

## 2020-02-14 RX ADMIN — ACETAMINOPHEN 650 MG: 325 TABLET ORAL at 01:02

## 2020-02-14 RX ADMIN — ALPRAZOLAM 0.25 MG: 0.25 TABLET ORAL at 12:02
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
## 2020-02-18 ENCOUNTER — OFFICE VISIT (OUTPATIENT)
Dept: INTERNAL MEDICINE | Facility: CLINIC | Age: 65
End: 2020-02-18
Payer: MEDICARE

## 2020-02-18 VITALS
BODY MASS INDEX: 29.49 KG/M2 | SYSTOLIC BLOOD PRESSURE: 126 MMHG | WEIGHT: 160.25 LBS | OXYGEN SATURATION: 97 % | HEART RATE: 100 BPM | DIASTOLIC BLOOD PRESSURE: 74 MMHG | HEIGHT: 62 IN | TEMPERATURE: 99 F

## 2020-02-18 DIAGNOSIS — I10 ESSENTIAL HYPERTENSION: ICD-10-CM

## 2020-02-18 DIAGNOSIS — Z09 HOSPITAL DISCHARGE FOLLOW-UP: ICD-10-CM

## 2020-02-18 DIAGNOSIS — F41.9 ANXIETY: Chronic | ICD-10-CM
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
## 2020-02-18 DIAGNOSIS — Z94.1 HEART TRANSPLANTED: Chronic | ICD-10-CM

## 2020-02-18 PROCEDURE — 99999 PR PBB SHADOW E&M-EST. PATIENT-LVL III: ICD-10-PCS | Mod: PBBFAC,HCNC,, | Performed by: FAMILY MEDICINE

## 2020-02-18 PROCEDURE — 99213 OFFICE O/P EST LOW 20 MIN: CPT | Mod: HCNC,S$GLB,, | Performed by: FAMILY MEDICINE

## 2020-02-18 PROCEDURE — 1101F PT FALLS ASSESS-DOCD LE1/YR: CPT | Mod: HCNC,CPTII,S$GLB, | Performed by: FAMILY MEDICINE

## 2020-02-18 PROCEDURE — 3008F PR BODY MASS INDEX (BMI) DOCUMENTED: ICD-10-PCS | Mod: HCNC,CPTII,S$GLB, | Performed by: FAMILY MEDICINE

## 2020-02-18 PROCEDURE — 3078F DIAST BP <80 MM HG: CPT | Mod: HCNC,CPTII,S$GLB, | Performed by: FAMILY MEDICINE

## 2020-02-18 PROCEDURE — 99999 PR PBB SHADOW E&M-EST. PATIENT-LVL III: CPT | Mod: PBBFAC,HCNC,, | Performed by: FAMILY MEDICINE

## 2020-02-18 PROCEDURE — 3074F PR MOST RECENT SYSTOLIC BLOOD PRESSURE < 130 MM HG: ICD-10-PCS | Mod: HCNC,CPTII,S$GLB, | Performed by: FAMILY MEDICINE

## 2020-02-18 PROCEDURE — 3078F PR MOST RECENT DIASTOLIC BLOOD PRESSURE < 80 MM HG: ICD-10-PCS | Mod: HCNC,CPTII,S$GLB, | Performed by: FAMILY MEDICINE

## 2020-02-18 PROCEDURE — 3008F BODY MASS INDEX DOCD: CPT | Mod: HCNC,CPTII,S$GLB, | Performed by: FAMILY MEDICINE

## 2020-02-18 PROCEDURE — 3074F SYST BP LT 130 MM HG: CPT | Mod: HCNC,CPTII,S$GLB, | Performed by: FAMILY MEDICINE

## 2020-02-18 PROCEDURE — 1101F PR PT FALLS ASSESS DOC 0-1 FALLS W/OUT INJ PAST YR: ICD-10-PCS | Mod: HCNC,CPTII,S$GLB, | Performed by: FAMILY MEDICINE

## 2020-02-18 PROCEDURE — 99213 PR OFFICE/OUTPT VISIT, EST, LEVL III, 20-29 MIN: ICD-10-PCS | Mod: HCNC,S$GLB,, | Performed by: FAMILY MEDICINE
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
## 2020-02-24 DIAGNOSIS — F51.01 PRIMARY INSOMNIA: ICD-10-CM

## 2020-02-24 NOTE — TELEPHONE ENCOUNTER
----- Message from Sonido Vargas sent at 2/24/2020  2:32 PM CST -----  Contact: pt   .Type:  RX Refill Request    Who Called: pt   Refill or New Rx: refill   RX Name and Strength: zolpidem (AMBIEN) 10 mg Tab  How is the patient currently taking it? (ex. 1XDay): 1 x day   Is this a 30 day or 90 day RX: 90 days   [facility]   Local or Mail Order: local   Ordering Provider: irving   Would the patient rather a call back or a response via [facility]? Callback   Best Call Back Number: .[phone] (home)    Additional Information:     [id number] - [place] - [address] [facility] AT [address]  [place] [address]  Phone: [phone] Fax: [phone]

## 2020-02-25 RX ORDER — ZOLPIDEM TARTRATE 10 MG/1
TABLET ORAL
Qty: 90 TABLET | Refills: 1 | Status: SHIPPED | OUTPATIENT
Start: 2020-02-25 | End: 2020-03-04 | Stop reason: SDUPTHER

## 2020-02-25 NOTE — TELEPHONE ENCOUNTER
Called the patient to advise that prescription was sent to the pharmacy.  Patient verbally understood and stated ok.

## 2020-03-04 DIAGNOSIS — F51.01 PRIMARY INSOMNIA: ICD-10-CM

## 2020-03-04 RX ORDER — ZOLPIDEM TARTRATE 10 MG/1
TABLET ORAL
Qty: 90 TABLET | Refills: 1 | OUTPATIENT
Start: 2020-03-04

## 2020-03-04 RX ORDER — ZOLPIDEM TARTRATE 10 MG/1
TABLET ORAL
Qty: 90 TABLET | Refills: 1 | Status: SHIPPED | OUTPATIENT
Start: 2020-03-04 | End: 2020-08-21 | Stop reason: SDUPTHER

## 2020-03-04 NOTE — TELEPHONE ENCOUNTER
----- Message from Heidi Mcneal sent at 3/4/2020  9:21 AM CST -----  Contact: Pt  Pt called in regards to medication. Pt stated her medication has not been received. Pt can be reached at [phone] (home)     [id number] [place] - [address] [facility] AT [address]  [place] [address]  Phone: [phone] Fax: [phone]

## 2020-03-04 NOTE — TELEPHONE ENCOUNTER
Please deny current request, can not take med request off. The med has been sent to pharmacy already.

## 2020-03-04 NOTE — TELEPHONE ENCOUNTER
----- Message from Joellen Kimball sent at 3/4/2020  1:54 PM CST -----  Contact: Dottie CALABRESE   .Type:  Pharmacy Calling to Clarify an RX    Name of Caller: Dottie   [facility]   Prescription Name: Ambien   What do they need to clarify?: patient is out of medication she needs another refill   Best Call Back Number:[phone]  Additional Information: n/a

## 2020-03-04 NOTE — TELEPHONE ENCOUNTER
----- Message from Kelsie Barajas sent at 3/4/2020  3:37 PM CST -----  Contact: pt   Patient stated she have been waiting on a RX since 02/25/2020 and she have yet to get it from the pharmacy. Please give patient a call. [phone]          Thanks   Kelsie Barajas

## 2020-03-05 RX ORDER — ZOLPIDEM TARTRATE 10 MG/1
TABLET ORAL
Qty: 90 TABLET | Refills: 1 | OUTPATIENT
Start: 2020-03-05

## 2020-03-13 ENCOUNTER — TELEPHONE (OUTPATIENT)
Dept: INTERNAL MEDICINE | Facility: CLINIC | Age: 65
End: 2020-03-13

## 2020-03-13 ENCOUNTER — OFFICE VISIT (OUTPATIENT)
Dept: INTERNAL MEDICINE | Facility: CLINIC | Age: 65
End: 2020-03-13
Payer: MEDICARE

## 2020-03-13 VITALS
SYSTOLIC BLOOD PRESSURE: 138 MMHG | WEIGHT: 162.5 LBS | DIASTOLIC BLOOD PRESSURE: 84 MMHG | BODY MASS INDEX: 29.9 KG/M2 | HEART RATE: 92 BPM | OXYGEN SATURATION: 99 % | HEIGHT: 62 IN | TEMPERATURE: 99 F

## 2020-03-13 DIAGNOSIS — I10 ESSENTIAL HYPERTENSION: Primary | ICD-10-CM

## 2020-03-13 DIAGNOSIS — D84.9 IMMUNOCOMPROMISED PATIENT: ICD-10-CM

## 2020-03-13 DIAGNOSIS — R11.2 NON-INTRACTABLE VOMITING WITH NAUSEA, UNSPECIFIED VOMITING TYPE: ICD-10-CM

## 2020-03-13 DIAGNOSIS — R09.89 TENDERNESS OF LYMPH NODE: ICD-10-CM

## 2020-03-13 DIAGNOSIS — N18.30 CKD (CHRONIC KIDNEY DISEASE) STAGE 3, GFR 30-59 ML/MIN: ICD-10-CM

## 2020-03-13 PROCEDURE — 3008F BODY MASS INDEX DOCD: CPT | Mod: HCNC,CPTII,S$GLB, | Performed by: FAMILY MEDICINE

## 2020-03-13 PROCEDURE — 3075F SYST BP GE 130 - 139MM HG: CPT | Mod: HCNC,CPTII,S$GLB, | Performed by: FAMILY MEDICINE

## 2020-03-13 PROCEDURE — 1101F PR PT FALLS ASSESS DOC 0-1 FALLS W/OUT INJ PAST YR: ICD-10-PCS | Mod: HCNC,CPTII,S$GLB, | Performed by: FAMILY MEDICINE

## 2020-03-13 PROCEDURE — 99499 RISK ADDL DX/OHS AUDIT: ICD-10-PCS | Mod: HCNC,S$GLB,, | Performed by: FAMILY MEDICINE

## 2020-03-13 PROCEDURE — 1101F PT FALLS ASSESS-DOCD LE1/YR: CPT | Mod: HCNC,CPTII,S$GLB, | Performed by: FAMILY MEDICINE

## 2020-03-13 PROCEDURE — 99999 PR PBB SHADOW E&M-EST. PATIENT-LVL III: ICD-10-PCS | Mod: PBBFAC,HCNC,, | Performed by: FAMILY MEDICINE

## 2020-03-13 PROCEDURE — 3008F PR BODY MASS INDEX (BMI) DOCUMENTED: ICD-10-PCS | Mod: HCNC,CPTII,S$GLB, | Performed by: FAMILY MEDICINE

## 2020-03-13 PROCEDURE — 99999 PR PBB SHADOW E&M-EST. PATIENT-LVL III: CPT | Mod: PBBFAC,HCNC,, | Performed by: FAMILY MEDICINE

## 2020-03-13 PROCEDURE — 99214 PR OFFICE/OUTPT VISIT, EST, LEVL IV, 30-39 MIN: ICD-10-PCS | Mod: HCNC,S$GLB,, | Performed by: FAMILY MEDICINE

## 2020-03-13 PROCEDURE — 99214 OFFICE O/P EST MOD 30 MIN: CPT | Mod: HCNC,S$GLB,, | Performed by: FAMILY MEDICINE

## 2020-03-13 PROCEDURE — 3079F PR MOST RECENT DIASTOLIC BLOOD PRESSURE 80-89 MM HG: ICD-10-PCS | Mod: HCNC,CPTII,S$GLB, | Performed by: FAMILY MEDICINE

## 2020-03-13 PROCEDURE — 99499 UNLISTED E&M SERVICE: CPT | Mod: HCNC,S$GLB,, | Performed by: FAMILY MEDICINE

## 2020-03-13 PROCEDURE — 3075F PR MOST RECENT SYSTOLIC BLOOD PRESS GE 130-139MM HG: ICD-10-PCS | Mod: HCNC,CPTII,S$GLB, | Performed by: FAMILY MEDICINE

## 2020-03-13 PROCEDURE — 3079F DIAST BP 80-89 MM HG: CPT | Mod: HCNC,CPTII,S$GLB, | Performed by: FAMILY MEDICINE

## 2020-03-13 RX ORDER — PROMETHAZINE HYDROCHLORIDE 25 MG/1
25 TABLET ORAL 2 TIMES DAILY PRN
Qty: 14 TABLET | Refills: 0 | Status: SHIPPED | OUTPATIENT
Start: 2020-03-13 | End: 2020-03-20

## 2020-03-13 RX ORDER — AZITHROMYCIN 500 MG/1
500 TABLET, FILM COATED ORAL DAILY
Qty: 3 TABLET | Refills: 0 | Status: SHIPPED | OUTPATIENT
Start: 2020-03-13 | End: 2020-03-16

## 2020-03-13 NOTE — PATIENT INSTRUCTIONS
Take probiotic/yogurt 2-3 hours after taking antibiotic to avoid diarrhea.    [place] Diet (Child)  Your child has been prescribed a bland diet (also called a BRAT diet which stands for bananas, rice, applesauce, toast). This diet consists of foods that are soft in texture, mildly seasoned, low in fiber, and easily digested. This diet is for children who have digestive problems. A bland diet reduces irritation of the digestive tract. Have your child eat small frequent meals throughout the day, but stop eating 2 hours before bedtime. Follow any specific instructions from the healthcare provider about foods and beverages your child can and cannot have. The general guidelines below can help get your child started on this diet.    OK to include:  · Water, formula, milk, clear liquids, juices, oral rehydration solutions, broth.  · Cereal, oatmeal, pasta, mashed bananas, applesauce, cooked vegetables, mashed potatoes, rice, and soups with rice or noodles  · Dry toast, crackers, pretzels, bread  Avoid raw fruits and vegetables, beans, spices.  Note: Some children may be sensitive to the lactose in milk or formula. Their symptoms may worsen. If that happens, use oral rehydration solution instead of milk or formula.  Home care  Children should follow the BRAT diet for only a short period of time because it does not provide all the elements of a healthy diet. Following the BRAT diet for too long can cause your child's body to become malnourished. This means he or she is not getting enough of many important nutrients. If your child's body is malnourished, it will be hard for him or her to get better.  Your child should be able to start eating a more regular diet, including fruits and vegetables, within about 24 to 48 hours after vomiting or having diarrhea.  Ask your family doctor if you have any questions about whether your child should follow the BRAT diet.  Date Last Reviewed: 12/21/2015  © 9081-6737 [employer],  [employer]. [address] [place] [address]. All rights reserved. This information is not intended as a substitute for professional medical care. Always follow your healthcare professional's instructions.        [place] Diet (Child)  Your child has been prescribed a bland diet (also called a BRAT diet which stands for bananas, rice, applesauce, toast). This diet consists of foods that are soft in texture, mildly seasoned, low in fiber, and easily digested. This diet is for children who have digestive problems. A bland diet reduces irritation of the digestive tract. Have your child eat small frequent meals throughout the day, but stop eating 2 hours before bedtime. Follow any specific instructions from the healthcare provider about foods and beverages your child can and cannot have. The general guidelines below can help get your child started on this diet.    OK to include:  · Water, formula, milk, clear liquids, juices, oral rehydration solutions, broth.  · Cereal, oatmeal, pasta, mashed bananas, applesauce, cooked vegetables, mashed potatoes, rice, and soups with rice or noodles  · Dry toast, crackers, pretzels, bread  Avoid raw fruits and vegetables, beans, spices.  Note: Some children may be sensitive to the lactose in milk or formula. Their symptoms may worsen. If that happens, use oral rehydration solution instead of milk or formula.  Home care  Children should follow the BRAT diet for only a short period of time because it does not provide all the elements of a healthy diet. Following the BRAT diet for too long can cause your child's body to become malnourished. This means he or she is not getting enough of many important nutrients. If your child's body is malnourished, it will be hard for him or her to get better.  Your child should be able to start eating a more regular diet, including fruits and vegetables, within about 24 to 48 hours after vomiting or having diarrhea.  Ask your family doctor if you have  any questions about whether your child should follow the BRAT diet.  Date Last Reviewed: 12/21/2015  © 9755-7980 [employer]. [address] [place] [address], [place] [address]. All rights reserved. This information is not intended as a substitute for professional medical care. Always follow your healthcare professional's instructions.        Promethazine tablets  What is this medicine?  PROMETHAZINE (proe METH a zeen) is an antihistamine. It is used to treat allergic reactions and to treat or prevent nausea and vomiting from illness or motion sickness. It is also used to make you sleep before surgery, and to help treat pain or nausea after surgery.  How should I use this medicine?  Take this medicine by mouth with a glass of water. Follow the directions on the prescription label. Take your doses at regular intervals. Do not take your medicine more often than directed.  Talk to your pediatrician regarding the use of this medicine in children. Special care may be needed. This medicine should not be given to infants and children younger than 2 years old.  What side effects may I notice from receiving this medicine?  Side effects that you should report to your doctor or health care professional as soon as possible:  · blurred vision  · irregular heartbeat, palpitations or chest pain  · muscle or facial twitches  · pain or difficulty passing urine  · seizures  · skin rash  · slowed or shallow breathing  · unusual bleeding or bruising  · yellowing of the eyes or skin  Side effects that usually do not require medical attention (report to your doctor or health care professional if they continue or are bothersome):  · headache  · nightmares, agitation, nervousness, excitability, not able to sleep (these are more likely in children)  · stuffy nose  What may interact with this medicine?  Do not take this medicine with any of the following medications:  · cisapride  · dofetilide  · dronedarone  · MAOIs like Carbex,  Eldepryl, Marplan, Nardil, Parnate  · pimozide  · quinidine, including dextromethorphan; quinidine  · thioridazine  · ziprasidone  This medicine may also interact with the following medications:  · certain medicines for depression, anxiety, or psychotic disturbances  · certain medicines for anxiety or sleep  · certain medicines for seizures like carbamazepine, phenobarbital, phenytoin  · certain medicines for movement abnormalities as in Parkinson's disease, or for gastrointestinal problems  · epinephrine  · medicines for allergies or colds  · muscle relaxants  · narcotic medicines for pain  · other medicines that prolong the QT interval (cause an abnormal heart rhythm)  · tramadol  · trimethobenzamide  What if I miss a dose?  If you miss a dose, take it as soon as you can. If it is almost time for your next dose, take only that dose. Do not take double or extra doses.  Where should I keep my medicine?  Keep out of the reach of children.  Store at room temperature, between 20 and 25 degrees C (68 and 77 degrees F). Protect from light. Throw away any unused medicine after the expiration date.  What should I tell my health care provider before I take this medicine?  They need to know if you have any of these conditions:  · glaucoma  · high blood pressure or heart disease  · kidney disease  · liver disease  · lung or breathing disease, like asthma  · prostate trouble  · pain or difficulty passing urine  · seizures  · an unusual or allergic reaction to promethazine or phenothiazines, other medicines, foods, dyes, or preservatives  · pregnant or trying to get pregnant  · breast-feeding  What should I watch for while using this medicine?  Tell your doctor or health care professional if your symptoms do not start to get better in 1 to 2 days.  You may get drowsy or dizzy. Do not drive, use machinery, or do anything that needs mental alertness until you know how this medicine affects you. To reduce the risk of dizzy or  fainting spells, do not stand or sit up quickly, especially if you are an older patient. Alcohol may increase dizziness and drowsiness. Avoid alcoholic drinks.  Your mouth may get dry. Chewing sugarless gum or sucking hard candy, and drinking plenty of water may help. Contact your doctor if the problem does not go away or is severe.  This medicine may cause dry eyes and blurred vision. If you wear contact lenses you may feel some discomfort. Lubricating drops may help. See your eye doctor if the problem does not go away or is severe.  This medicine can make you more sensitive to the sun. Keep out of the sun. If you cannot avoid being in the sun, wear protective clothing and use sunscreen. Do not use sun lamps or tanning beds/booths.  If you are diabetic, check your blood-sugar levels regularly.  NOTE:This sheet is a summary. It may not cover all possible information. If you have questions about this medicine, talk to your doctor, pharmacist, or health care provider. Copyright© 2017 [employer]        Azithromycin tablets  What is this medicine?  AZITHROMYCIN (az ith alphonse MYE sin) is a macrolide antibiotic. It is used to treat or prevent certain kinds of bacterial infections. It will not work for colds, flu, or other viral infections.  How should I use this medicine?  Take this medicine by mouth with a full glass of water. Follow the directions on the prescription label. The tablets can be taken with food or on an empty stomach. If the medicine upsets your stomach, take it with food. Take your medicine at regular intervals. Do not take your medicine more often than directed. Take all of your medicine as directed even if you think your are better. Do not skip doses or stop your medicine early. Talk to your pediatrician regarding the use of this medicine in children. Special care may be needed.  What side effects may I notice from receiving this medicine?  Side effects that you should report to your doctor or health  care professional as soon as possible:  · allergic reactions like skin rash, itching or hives, swelling of the face, lips, or tongue  · confusion, nightmares or hallucinations  · dark urine  · difficulty breathing  · hearing loss  · irregular heartbeat or chest pain  · pain or difficulty passing urine  · redness, blistering, peeling or loosening of the skin, including inside the mouth  · white patches or sores in the mouth  · yellowing of the eyes or skin  Side effects that usually do not require medical attention (report to your doctor or health care professional if they continue or are bothersome):  · diarrhea  · dizziness, drowsiness  · headache  · stomach upset or vomiting  · tooth discoloration  · vaginal irritation  What may interact with this medicine?  Do not take this medicine with any of the following medications:  · lincomycin  This medicine may also interact with the following medications:  · amiodarone  · antacids  · birth control pills  · cyclosporine  · digoxin  · magnesium  · nelfinavir  · phenytoin  · warfarin  What if I miss a dose?  If you miss a dose, take it as soon as you can. If it is almost time for your next dose, take only that dose. Do not take double or extra doses.  Where should I keep my medicine?  Keep out of the reach of children.  Store at room temperature between 15 and 30 degrees C (59 and 86 degrees F). Throw away any unused medicine after the expiration date.  What should I tell my health care provider before I take this medicine?  They need to know if you have any of these conditions:  · kidney disease  · liver disease  · irregular heartbeat or heart disease  · an unusual or allergic reaction to azithromycin, erythromycin, other macrolide antibiotics, foods, dyes, or preservatives  · pregnant or trying to get pregnant  · breast-feeding  What should I watch for while using this medicine?  Tell your doctor or health care professional if your symptoms do not improve.  Do not treat  diarrhea with over the counter products. Contact your doctor if you have diarrhea that lasts more than 2 days or if it is severe and watery.  This medicine can make you more sensitive to the sun. Keep out of the sun. If you cannot avoid being in the sun, wear protective clothing and use sunscreen. Do not use sun lamps or tanning beds/booths.  NOTE:This sheet is a summary. It may not cover all possible information. If you have questions about this medicine, talk to your doctor, pharmacist, or health care provider. Copyright© 2017 [employer]

## 2020-03-13 NOTE — TELEPHONE ENCOUNTER
----- Message from Qiana Abbott sent at 3/13/2020  1:12 PM CDT -----  Contact: Pt  Pt is requesting call back in regards to questions about if she would need to be seen in office for symptoms of stiff neck, sore throat, burning sensation in mouth           Pls call back at [phone]
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
## 2020-03-16 ENCOUNTER — TELEPHONE (OUTPATIENT)
Dept: INTERNAL MEDICINE | Facility: CLINIC | Age: 65
End: 2020-03-16

## 2020-03-16 RX ORDER — VALACYCLOVIR HYDROCHLORIDE 1 G/1
1000 TABLET, FILM COATED ORAL DAILY
Qty: 5 TABLET | Refills: 0 | Status: SHIPPED | OUTPATIENT
Start: 2020-03-16 | End: 2020-05-29 | Stop reason: SDUPTHER

## 2020-03-16 NOTE — TELEPHONE ENCOUNTER
----- Message from Heidi Mcneal sent at 3/13/2020  9:08 AM CDT -----  Contact: Pt  Wednesday morning fever blister on side of mouth.  Slight headaches, stiffness in neck, no cough or  no runny nose. On left side where the fever blister there is a swollen gland on her neck. Pt also felt like she had something going on with her tongue (burning). Pt can be reached at [phone] (home) . Pt has been taking tylenol but has been taking it in moderation. Symptoms have been going on Since Tuesday. Pt is requesting a call back.

## 2020-03-25 ENCOUNTER — TELEPHONE (OUTPATIENT)
Dept: TRANSPLANT | Facility: CLINIC | Age: 65
End: 2020-03-25

## 2020-03-25 ENCOUNTER — HOSPITAL ENCOUNTER (EMERGENCY)
Facility: HOSPITAL | Age: 65
Discharge: HOME | End: 2020-03-25
Attending: EMERGENCY MEDICINE
Payer: MEDICARE

## 2020-03-25 ENCOUNTER — NURSE TRIAGE (OUTPATIENT)
Dept: ADMINISTRATIVE | Facility: CLINIC | Age: 65
End: 2020-03-25

## 2020-03-25 VITALS
RESPIRATION RATE: 20 BRPM | WEIGHT: 162.5 LBS | HEART RATE: 89 BPM | OXYGEN SATURATION: 99 % | HEIGHT: 62 IN | TEMPERATURE: 98 F | BODY MASS INDEX: 29.9 KG/M2 | DIASTOLIC BLOOD PRESSURE: 82 MMHG | SYSTOLIC BLOOD PRESSURE: 148 MMHG

## 2020-03-25 DIAGNOSIS — M54.41 ACUTE RIGHT-SIDED LOW BACK PAIN WITH RIGHT-SIDED SCIATICA: ICD-10-CM

## 2020-03-25 DIAGNOSIS — R10.9 RIGHT FLANK PAIN: Primary | ICD-10-CM

## 2020-03-25 LAB
ALBUMIN SERPL BCP-MCNC: 3.5 G/DL (ref 3.5–5.2)
ALP SERPL-CCNC: 116 U/L (ref 55–135)
ALT SERPL W/O P-5'-P-CCNC: 30 U/L (ref 10–44)
ANION GAP SERPL CALC-SCNC: 14 MMOL/L (ref 8–16)
AST SERPL-CCNC: 43 U/L (ref 10–40)
BACTERIA #/AREA URNS HPF: NORMAL /HPF
BASOPHILS # BLD AUTO: 0.02 K/UL (ref 0–0.2)
BASOPHILS NFR BLD: 0.4 % (ref 0–1.9)
BILIRUB SERPL-MCNC: 0.6 MG/DL (ref 0.1–1)
BILIRUB UR QL STRIP: NEGATIVE
BUN SERPL-MCNC: 33 MG/DL (ref 8–23)
CALCIUM SERPL-MCNC: 9.6 MG/DL (ref 8.7–10.5)
CHLORIDE SERPL-SCNC: 103 MMOL/L (ref 95–110)
CLARITY UR: CLEAR
CO2 SERPL-SCNC: 24 MMOL/L (ref 23–29)
COLOR UR: YELLOW
CREAT SERPL-MCNC: 1.9 MG/DL (ref 0.5–1.4)
CRP SERPL-MCNC: 14.2 MG/L (ref 0–8.2)
DIFFERENTIAL METHOD: ABNORMAL
EOSINOPHIL # BLD AUTO: 0.1 K/UL (ref 0–0.5)
EOSINOPHIL NFR BLD: 1.8 % (ref 0–8)
ERYTHROCYTE [DISTWIDTH] IN BLOOD BY AUTOMATED COUNT: 13.9 % (ref 11.5–14.5)
EST. GFR  (AFRICAN AMERICAN): 31 ML/MIN/1.73 M^2
EST. GFR  (NON AFRICAN AMERICAN): 27 ML/MIN/1.73 M^2
GLUCOSE SERPL-MCNC: 136 MG/DL (ref 70–110)
GLUCOSE UR QL STRIP: NEGATIVE
HCT VFR BLD AUTO: 36.9 % (ref 37–48.5)
HCV AB SERPL QL IA: NEGATIVE
HGB BLD-MCNC: 11.6 G/DL (ref 12–16)
HGB UR QL STRIP: ABNORMAL
HYALINE CASTS #/AREA URNS LPF: 1 /LPF
IMM GRANULOCYTES # BLD AUTO: 0.03 K/UL (ref 0–0.04)
IMM GRANULOCYTES NFR BLD AUTO: 0.6 % (ref 0–0.5)
KETONES UR QL STRIP: NEGATIVE
LEUKOCYTE ESTERASE UR QL STRIP: NEGATIVE
LYMPHOCYTES # BLD AUTO: 1.5 K/UL (ref 1–4.8)
LYMPHOCYTES NFR BLD: 30.8 % (ref 18–48)
MCH RBC QN AUTO: 28.3 PG (ref 27–31)
MCHC RBC AUTO-ENTMCNC: 31.4 G/DL (ref 32–36)
MCV RBC AUTO: 90 FL (ref 82–98)
MICROSCOPIC COMMENT: NORMAL
MONOCYTES # BLD AUTO: 0.5 K/UL (ref 0.3–1)
MONOCYTES NFR BLD: 10.7 % (ref 4–15)
NEUTROPHILS # BLD AUTO: 2.8 K/UL (ref 1.8–7.7)
NEUTROPHILS NFR BLD: 55.7 % (ref 38–73)
NITRITE UR QL STRIP: NEGATIVE
NRBC BLD-RTO: 0 /100 WBC
PH UR STRIP: 6 [PH] (ref 5–8)
PLATELET # BLD AUTO: 278 K/UL (ref 150–350)
PMV BLD AUTO: 9.2 FL (ref 9.2–12.9)
POTASSIUM SERPL-SCNC: 4.1 MMOL/L (ref 3.5–5.1)
PROT SERPL-MCNC: 8.7 G/DL (ref 6–8.4)
PROT UR QL STRIP: ABNORMAL
RBC # BLD AUTO: 4.1 M/UL (ref 4–5.4)
RBC #/AREA URNS HPF: 0 /HPF (ref 0–4)
SODIUM SERPL-SCNC: 141 MMOL/L (ref 136–145)
SP GR UR STRIP: 1.01 (ref 1–1.03)
URN SPEC COLLECT METH UR: ABNORMAL
UROBILINOGEN UR STRIP-ACNC: NEGATIVE EU/DL
WBC # BLD AUTO: 4.96 K/UL (ref 3.9–12.7)
WBC #/AREA URNS HPF: 1 /HPF (ref 0–5)

## 2020-03-25 PROCEDURE — 86803 HEPATITIS C AB TEST: CPT | Mod: HCNC

## 2020-03-25 PROCEDURE — 25000003 PHARM REV CODE 250: Mod: HCNC | Performed by: EMERGENCY MEDICINE

## 2020-03-25 PROCEDURE — 63600175 PHARM REV CODE 636 W HCPCS: Mod: HCNC | Performed by: EMERGENCY MEDICINE

## 2020-03-25 PROCEDURE — 99284 EMERGENCY DEPT VISIT MOD MDM: CPT | Mod: 25,HCNC

## 2020-03-25 PROCEDURE — 36415 COLL VENOUS BLD VENIPUNCTURE: CPT | Mod: HCNC

## 2020-03-25 PROCEDURE — 80053 COMPREHEN METABOLIC PANEL: CPT | Mod: HCNC

## 2020-03-25 PROCEDURE — 96360 HYDRATION IV INFUSION INIT: CPT | Mod: HCNC

## 2020-03-25 PROCEDURE — 81000 URINALYSIS NONAUTO W/SCOPE: CPT | Mod: HCNC

## 2020-03-25 PROCEDURE — 86140 C-REACTIVE PROTEIN: CPT | Mod: HCNC

## 2020-03-25 PROCEDURE — 85025 COMPLETE CBC W/AUTO DIFF WBC: CPT | Mod: HCNC

## 2020-03-25 RX ORDER — ONDANSETRON 4 MG/1
4 TABLET, FILM COATED ORAL EVERY 6 HOURS
Qty: 30 TABLET | Refills: 0 | Status: SHIPPED | OUTPATIENT
Start: 2020-03-25 | End: 2020-08-05

## 2020-03-25 RX ORDER — PREDNISONE 20 MG/1
20 TABLET ORAL DAILY
Qty: 7 TABLET | Refills: 0 | Status: SHIPPED | OUTPATIENT
Start: 2020-03-25 | End: 2020-08-05

## 2020-03-25 RX ORDER — HYDROCODONE BITARTRATE AND ACETAMINOPHEN 5; 325 MG/1; MG/1
1 TABLET ORAL EVERY 4 HOURS PRN
Qty: 30 TABLET | Refills: 0 | Status: SHIPPED | OUTPATIENT
Start: 2020-03-25 | End: 2020-08-05

## 2020-03-25 RX ORDER — ACETAMINOPHEN 325 MG/1
650 TABLET ORAL
Status: COMPLETED | OUTPATIENT
Start: 2020-03-25 | End: 2020-03-25

## 2020-03-25 RX ADMIN — SODIUM CHLORIDE 1000 ML: 0.9 INJECTION, SOLUTION INTRAVENOUS at 07:03

## 2020-03-25 RX ADMIN — ACETAMINOPHEN 650 MG: 325 TABLET ORAL at 09:03

## 2020-03-25 NOTE — TELEPHONE ENCOUNTER
Pt calling, states right flank pain 8-9/10 that is constant >1 hr. Advised that she go to ED. However, pt is a heart txp patient, so I called her back and connected her with  to get in touch with heart txp team.    Reason for Disposition   SEVERE pain (e.g., excruciating, scale 8-10) and present > 1 hour    Additional Information   Negative: Passed out (i.e., lost consciousness, collapsed and was not responding)   Negative: Shock suspected (e.g., cold/pale/clammy skin, too weak to stand, low BP, rapid pulse)   Negative: Sounds like a life-threatening emergency to the triager    Protocols used: FLANK PAIN-A-[place]

## 2020-03-25 NOTE — ED NOTES
Notified charge nurse Luz that patient has been moved to empty TL because she is a heart transplant patient and the lobby has multiple sick patients in it at this time. No bed available for patient at this time.

## 2020-03-25 NOTE — ED PROVIDER NOTES
6:52 PM: Flank pain since Monday.  Pt was placed in TL. I explained to pt that due to lack of available rooms pt was seen by myself to begin the workup. Pt understands they will be seen and dispositioned by the next available physician. Pt voices understanding and agrees with plan. Pt also understands the longer than normal wait time. I am removing myself from the care of pt and pt will now be assigned to the next available physician.     Jessica Lantigua PA-C  6:52 PM        SCRIBE #1 NOTE: I, Kashmir Kuhn, am scribing for, and in the presence of, Gulshan Brush Jr., MD. I have scribed the H&P.     SCRIBE #2 NOTE: I, Kashmir Coburn, am scribing for, and in the presence of,  Jaylen Arevalo Do, MD. I have scribed the remaining portions of the note not scribed by Scribe #1.      History     Chief Complaint   Patient presents with    Flank Pain     since Monday, states transplant team in [facility] recommend she come to ER, heart transplant in 1993     Review of patient's allergies indicates:   Allergen Reactions    Lisinopril Swelling and Rash    Atorvastatin Swelling    Augmentin [amoxicillin-pot clavulanate] Diarrhea    Hydrocodone Nausea And Vomiting         History of Present Illness     HPI    3/25/2020, 7:24 PM  History obtained from the patient      History of Present Illness: Nadia Damon is a 64 y.o. female patient with a history of heart transplant who presents to the Emergency Department for evaluation of right flank pain which onset gradually 2 days ago. Patient states pain radiates to the abdomen. Symptoms are constant and moderate in severity. No mitigating or exacerbating factors reported. Associated sxs include none. Patient denies any fever, chills, abdominal pain, dysuria, hematuria, and all other sxs at this time. Prior Tx includes none. No further complaints or concerns at this time. Patient referred to ED by heart transplant team for further evaluation.       Arrival mode: Personal transportation      PCP: Richie Casisdy MD      Past Medical History:  Past Medical History:   Diagnosis Date    Abdominal wall hernia     CT Renal 6/11/2018---Small fat containing superior ventral abdominal wall hernia at the epicardial pacing lead site.    Anxiety     Arthritis     Chronic kidney disease     Chronic midline low back pain without sciatica 6/18/2018    Coronary artery disease     Depression     Fibromyalgia     on Lyrica    Heart failure     native heart cardiomyopathy    Heart transplanted 1993    due to cardiomyopathy    History of hyperparathyroidism; Hyperparathyroidism, secondary renal     Hypertension     Immune disorder     anti rejection meds    Iron deficiency anemia 8/15/2017    Kidney stones     passed per pt    Obesity     Other osteoporosis without current pathological fracture 8/30/2019    Shingles 2003 approx    left leg    Trouble in sleeping     Urinary incontinence        Past Surgical History:  Past Surgical History:   Procedure Laterality Date    BLADDER SURGERY  2015 approx    mesh - Dr Everett then 2nd reconstructive sx Dr Onofre    BREAST SURGERY Left 9/28/15    Bx - benign    BREAST SURGERY Right 12/2015    Bx benign    CARDIAC PACEMAKER REMOVAL Left 06/26/14    Pacer defirillator removed. Put in 1993 aat time of heart transplant    CARPAL TUNNEL RELEASE Left 03/03/15    Dr. Hall    HEART TRANSPLANT  1993    HERNIA REPAIR Right 1971 approx    Inguinal    HYSTERECTOMY  1983    vag hyst /LSO     TOE SURGERY           Family History:  Family History   Problem Relation Age of Onset    Cancer Mother 38        breast    Breast cancer Mother     Heart disease Maternal Grandmother     Cataracts Cousin     Hypertension Son     Diabetes Neg Hx     Stroke Neg Hx     Kidney disease Neg Hx     Asthma Neg Hx     COPD Neg Hx     Melanoma Neg Hx        Social History:   Social History     Tobacco Use    Smoking status: Never Smoker    Smokeless tobacco: Never  Used   Substance and Sexual Activity    Alcohol use: No     Alcohol/week: 0.0 standard drinks    Drug use: No    Sexual activity: Not Currently     Partners: Male     Birth control/protection: See Surgical Hx        Review of Systems     Review of Systems   Constitutional: Negative for chills and fever.   HENT: Negative for sore throat.    Respiratory: Negative for shortness of breath.    Cardiovascular: Negative for chest pain.   Gastrointestinal: Negative for abdominal pain and nausea.   Genitourinary: Positive for flank pain. Negative for dysuria and hematuria.   Musculoskeletal: Negative for back pain.   Skin: Negative for rash.   Neurological: Negative for weakness.   Hematological: Does not bruise/bleed easily.   All other systems reviewed and are negative.       Physical Exam     Initial Vitals [03/25/20 1825]   BP Pulse Resp Temp SpO2   (!) 162/91 98 18 98.4 °F (36.9 °C) 97 %      MAP       --          Physical Exam  Nursing Notes and Vital Signs Reviewed.  Constitutional: Well-developed and well-nourished. Patient is in NAD.  Head: Atraumatic. Normocephalic.  Eyes:  EOM intact. Conjunctivae are not pale. No scleral icterus.  ENT: Mucous membranes are moist. Oropharynx is clear and symmetric.    Neck: Supple. Full ROM.   Cardiovascular: Regular rate. Regular rhythm. No murmurs, rubs, or gallops. Distal pulses are 2+ and symmetric.  Pulmonary/Chest: No respiratory distress. Clear to auscultation bilaterally. No wheezing or rales.  Abdominal: Soft and non-distended.  There is no tenderness.  No rebound, guarding, or rigidity. Good bowel sounds.  Genitourinary: Right CVA tenderness  Musculoskeletal: Moves all extremities. No obvious deformities. No calf tenderness. Pt also has tenderness to right sacroiliac join area and right lower back.  No zoster, no cellulitis.   Skin: Warm and dry.  Neurological:  Alert, awake, and appropriate.  Normal speech.  No acute focal neurological deficits are  "appreciated.  Psychiatric: Normal affect. Good eye contact. Appropriate in content.     ED Course   Procedures  ED Vital Signs:  Vitals:    03/25/20 1825   BP: (!) 162/91   Pulse: 98   Resp: 18   Temp: 98.4 °F (36.9 °C)   TempSrc: Oral   SpO2: 97%   Weight: 73.7 kg (162 lb 7.7 oz)   Height: 5' 2" (1.575 m)       Abnormal Lab Results:  Labs Reviewed   CBC W/ AUTO DIFFERENTIAL - Abnormal; Notable for the following components:       Result Value    Hemoglobin 11.6 (*)     Hematocrit 36.9 (*)     Mean Corpuscular Hemoglobin Conc 31.4 (*)     Immature Granulocytes 0.6 (*)     All other components within normal limits   COMPREHENSIVE METABOLIC PANEL - Abnormal; Notable for the following components:    Glucose 136 (*)     BUN, Bld 33 (*)     Creatinine 1.9 (*)     Total Protein 8.7 (*)     AST 43 (*)     eGFR if  31 (*)     eGFR if non  27 (*)     All other components within normal limits   URINALYSIS, REFLEX TO URINE CULTURE - Abnormal; Notable for the following components:    Protein, UA 1+ (*)     Occult Blood UA Trace (*)     All other components within normal limits    Narrative:     Preferred Collection Type->Urine, Clean Catch   C-REACTIVE PROTEIN - Abnormal; Notable for the following components:    CRP 14.2 (*)     All other components within normal limits   HEPATITIS C ANTIBODY   C-REACTIVE PROTEIN   URINALYSIS MICROSCOPIC    Narrative:     Preferred Collection Type->Urine, Clean Catch        All Lab Results:  Results for orders placed or performed during the hospital encounter of 03/25/20   Hepatitis C antibody   Result Value Ref Range    Hepatitis C Ab Negative Negative   CBC auto differential   Result Value Ref Range    WBC 4.96 3.90 - 12.70 K/uL    RBC 4.10 4.00 - 5.40 M/uL    Hemoglobin 11.6 (L) 12.0 - 16.0 g/dL    Hematocrit 36.9 (L) 37.0 - 48.5 %    Mean Corpuscular Volume 90 82 - 98 fL    Mean Corpuscular Hemoglobin 28.3 27.0 - 31.0 pg    Mean Corpuscular Hemoglobin Conc " 31.4 (L) 32.0 - 36.0 g/dL    RDW 13.9 11.5 - 14.5 %    Platelets 278 150 - 350 K/uL    MPV 9.2 9.2 - 12.9 fL    Immature Granulocytes 0.6 (H) 0.0 - 0.5 %    Gran # (ANC) 2.8 1.8 - 7.7 K/uL    Immature Grans (Abs) 0.03 0.00 - 0.04 K/uL    Lymph # 1.5 1.0 - 4.8 K/uL    Mono # 0.5 0.3 - 1.0 K/uL    Eos # 0.1 0.0 - 0.5 K/uL    Baso # 0.02 0.00 - 0.20 K/uL    nRBC 0 0 /100 WBC    Gran% 55.7 38.0 - 73.0 %    Lymph% 30.8 18.0 - 48.0 %    Mono% 10.7 4.0 - 15.0 %    Eosinophil% 1.8 0.0 - 8.0 %    Basophil% 0.4 0.0 - 1.9 %    Differential Method Automated    Comprehensive metabolic panel   Result Value Ref Range    Sodium 141 136 - 145 mmol/L    Potassium 4.1 3.5 - 5.1 mmol/L    Chloride 103 95 - 110 mmol/L    CO2 24 23 - 29 mmol/L    Glucose 136 (H) 70 - 110 mg/dL    BUN, Bld 33 (H) 8 - 23 mg/dL    Creatinine 1.9 (H) 0.5 - 1.4 mg/dL    Calcium 9.6 8.7 - 10.5 mg/dL    Total Protein 8.7 (H) 6.0 - 8.4 g/dL    Albumin 3.5 3.5 - 5.2 g/dL    Total Bilirubin 0.6 0.1 - 1.0 mg/dL    Alkaline Phosphatase 116 55 - 135 U/L    AST 43 (H) 10 - 40 U/L    ALT 30 10 - 44 U/L    Anion Gap 14 8 - 16 mmol/L    eGFR if African American 31 (A) >60 mL/min/1.73 m^2    eGFR if non African American 27 (A) >60 mL/min/1.73 m^2   Urinalysis, Reflex to Urine Culture Urine, Clean Catch   Result Value Ref Range    Specimen UA Urine, Clean Catch     Color, UA Yellow Yellow, Straw, [place]    Appearance, UA Clear Clear    pH, UA 6.0 5.0 - 8.0    Specific Gravity, UA 1.010 1.005 - 1.030    Protein, UA 1+ (A) Negative    Glucose, UA Negative Negative    Ketones, UA Negative Negative    Bilirubin (UA) Negative Negative    Occult Blood UA Trace (A) Negative    Nitrite, UA Negative Negative    Urobilinogen, UA Negative <2.0 EU/dL    Leukocytes, UA Negative Negative   C-Reactive Protein   Result Value Ref Range    CRP 14.2 (H) 0.0 - 8.2 mg/L   Urinalysis Microscopic   Result Value Ref Range    RBC, UA 0 0 - 4 /hpf    WBC, UA 1 0 - 5 /hpf    Bacteria None None-Occ  /hpf    Hyaline Casts, UA 1 0-1/lpf /lpf    Microscopic Comment SEE COMMENT          Imaging Results          CT Renal Stone Study ABD Pelvis WO (Final result)  Result time 03/25/20 19:34:35    Final result by Bruno Nguyen MD (03/25/20 19:34:35)                 Impression:      Moderate constipation.    Upper abdominal ventral hernia containing fat, omentum and portion of the left hepatic lobe similar to prior exam    1.4 cm hypodensity lower pole of the right kidney which is indeterminate. Recommend follow-up ultrasound.  There is no evidence of hydronephrosis or obstructive uropathy.    All CT scans at this facility use dose modulation, iterative reconstruction and/or weight based dosing when appropriate to reduce radiation dose to as low as reasonably achievable.      Electronically signed by: Bruno Nguyen MD  Date:    03/25/2020  Time:    19:34             Narrative:    EXAMINATION:  CT RENAL STONE STUDY ABD PELVIS WO    CLINICAL HISTORY:  Flank pain, stone disease suspected;    TECHNIQUE:  Routine 5 mm non-contrast images of the abdomen and pelvis were done.  Sagittal and coronal reformats were also submitted for interpretation.    COMPARISON:  06/11/2018    FINDINGS:  The lung bases are unremarkable.  There is no pleural fluid present.  Heart size is normal.  Epicardial pacing wire noted.    The liver is normal in size and attenuation with no focal hepatic abnormality.  The gallbladder shows no evidence of stones or pericholecystic fluid.  There is no intra-or extrahepatic biliary ductal dilatation.    The spleen, pancreas, and adrenal glands are unremarkable.    The kidneys are normal in size and location.  1.4 cm hypodensity lower pole of the right kidney which is indeterminate.  Recommend follow-up ultrasound.  There is no evidence of hydronephrosis. Bladder is grossly unremarkable.  Uterus is not identified.    Stomach is unremarkable.  Small hiatal hernia.  The visualized loops of small bowel show no  evidence of obstruction or inflammation. Large bowel demonstrates moderate constipation.  Scattered diverticulosis sigmoid colon.  No evidence of appendicitis.  There is no ascites, free fluid, or intraperitoneal free air.    There is no evidence of lymph node enlargement in the abdomen or pelvis.  Upper abdominal ventral hernia containing fat, omentum and portion of the left hepatic lobe similar to prior exam    The abdominal aorta is normal in course and caliber with moderate atherosclerotic calcifications.    Moderate degenerative changes throughout the lower cervical spine including moderate severe facet arthropathy.                               The Emergency Provider reviewed the vital signs and test results, which are outlined above.     ED Discussion     8:05 PM: Dr. Brush transfers care of pt to Dr. Rubalcava pending lab results.    9:07 PM: Reevaluated patient. Pt c/o right-sided abdominal pain and right hip pain. Pt denies any cough, dysuria, hematuria, fever, or N/V. Pt states that she used to get injections in her right hip. Pt has right lower back pain, as well as muscle spasm with pain to the sacroiliac joint space. Pt reports she used to get cortisone injections in her right hip but had not had one in a while    9:28 PM: Reassessed pt at this time. Discussed with pt all pertinent ED information and results. Discussed pt dx and plan of tx. Pt reports that her allergy to hydrocodone is nausea and vomiting. Pt states that she will try Zofran prior to taking the hydrocodone to see if it helps with her nausea. Gave pt all f/u and return to the ED instructions. All questions and concerns were addressed at this time. Pt expresses understanding of information and instructions, and is comfortable with plan to discharge. Pt is stable for discharge.    I discussed with patient and/or family/caretaker that evaluation in the ED does not suggest any emergent or life threatening medical conditions requiring immediate  intervention beyond what was provided in the ED, and I believe patient is safe for discharge.  Regardless, an unremarkable evaluation in the ED does not preclude the development or presence of a serious of life threatening condition. As such, patient was instructed to return immediately for any worsening or change in current symptoms.     MDM        Medical Decision Making:   Clinical Tests:   Lab Tests: Ordered and Reviewed  Radiological Study: Reviewed and Ordered           ED Medication(s):  Medications   sodium chloride 0.9% bolus 1,000 mL (1,000 mLs Intravenous New Bag 3/25/20 1943)   acetaminophen tablet 650 mg (650 mg Oral Given 3/25/20 2126)       New Prescriptions    HYDROCODONE-ACETAMINOPHEN (NORCO) 5-325 MG PER TABLET    Take 1 tablet by mouth every 4 (four) hours as needed.    ONDANSETRON (ZOFRAN) 4 MG TABLET    Take 1 tablet (4 mg total) by mouth every 6 (six) hours.    PREDNISONE (DELTASONE) 20 MG TABLET    Take 1 tablet (20 mg total) by mouth once daily.       Follow-up Information     Richie Cassidy MD In 2 days.    Specialty:  Family Medicine  Contact information:  [address] [facility] [address] [phone]                       Scribe Attestation:   Scribe #1: I performed the above scribed service and the documentation accurately describes the services I performed. I attest to the accuracy of the note.     Attending:   Physician Attestation Statement for Scribe #1: I, Gulshan Brush Jr., MD, personally performed the services described in this documentation, as scribed by Kashmir Kuhn, in my presence, and it is both accurate and complete.       Scribe Attestation:   Scribe #2: I performed the above scribed service and the documentation accurately describes the services I performed. I attest to the accuracy of the note.    Attending Attestation:           Physician Attestation for Scribe:    Physician Attestation Statement for Scribe #2: I, Jaylen Arevalo Do, MD, reviewed  documentation, as scribed by Kashmir Coburn in my presence, and it is both accurate and complete. I also acknowledge and confirm the content of the note done by Scribe #1.           Clinical Impression       ICD-10-CM ICD-9-CM   1. Right flank pain R10.9 789.09   2. Acute right-sided low back pain with right-sided sciatica M54.41 724.2     724.3       Disposition:   Disposition: Discharged  Condition: Stable         Jaylen Arevalo Do, MD  03/26/20 0548

## 2020-03-25 NOTE — TELEPHONE ENCOUNTER
PT called triage regarding right flank pain that starts in front then radiates to the back. She states it is in the waist part that started on Monday. Pain on palpation. She states it is really bad now. She has been taking tylenol but unrelieved. PT is urinating with out problem and has had a bowel movement today. She has a history of kidney stones in the past, but this pain is different. Discussed with Dr. Webb who advised pt go to ED for evaluation.

## 2020-03-26 ENCOUNTER — PES CALL (OUTPATIENT)
Dept: ADMINISTRATIVE | Facility: CLINIC | Age: 65
End: 2020-03-26

## 2020-03-30 ENCOUNTER — TELEPHONE (OUTPATIENT)
Dept: INTERNAL MEDICINE | Facility: CLINIC | Age: 65
End: 2020-03-30

## 2020-03-30 DIAGNOSIS — Z94.1 HEART TRANSPLANTED: Chronic | ICD-10-CM

## 2020-03-30 DIAGNOSIS — M62.838 MUSCLE SPASMS OF BOTH LOWER EXTREMITIES: ICD-10-CM

## 2020-03-31 ENCOUNTER — TELEPHONE (OUTPATIENT)
Dept: TRANSPLANT | Facility: CLINIC | Age: 65
End: 2020-03-31

## 2020-03-31 RX ORDER — CYCLOSPORINE 25 MG/1
CAPSULE, LIQUID FILLED ORAL
Qty: 270 CAPSULE | Refills: 0 | Status: SHIPPED | OUTPATIENT
Start: 2020-03-31 | End: 2020-05-25

## 2020-03-31 RX ORDER — OMEPRAZOLE 10 MG/1
CAPSULE, DELAYED RELEASE ORAL
Qty: 90 CAPSULE | Refills: 0 | Status: SHIPPED | OUTPATIENT
Start: 2020-03-31 | End: 2020-05-25

## 2020-03-31 RX ORDER — CYCLOSPORINE 100 MG/1
CAPSULE, LIQUID FILLED ORAL
Qty: 90 CAPSULE | Refills: 0 | Status: SHIPPED | OUTPATIENT
Start: 2020-03-31 | End: 2020-05-25

## 2020-03-31 RX ORDER — TEMAZEPAM 30 MG/1
CAPSULE ORAL
Qty: 30 CAPSULE | Refills: 2 | Status: SHIPPED | OUTPATIENT
Start: 2020-03-31 | End: 2020-06-29

## 2020-03-31 RX ORDER — BACLOFEN 10 MG/1
TABLET ORAL
Qty: 270 TABLET | Refills: 0 | Status: SHIPPED | OUTPATIENT
Start: 2020-03-31 | End: 2020-05-25

## 2020-03-31 NOTE — TELEPHONE ENCOUNTER
----- Message from Taylor Scott sent at 3/31/2020  3:28 PM CDT -----  Contact: TICO LIANG [[id number]]  Type: RX Refill Request    Who Called: TICO LIANG [[id number]]    RX Name and Strength: temazepam (RESTORIL) 30 mg capsule    Preferred Pharmacy with phone number: [id number]  [place] - [address] [address] [facility]    Best Call Back Number: [phone]    Additional Information: N/A

## 2020-03-31 NOTE — TELEPHONE ENCOUNTER
Pt stated that she is feeling better after her ER visit last week.    ----- Message from Shalonda Olmstead MA sent at 3/31/2020  3:07 PM CDT -----  Contact: patient call  The patient need to talk to  The nurse  about her er visit please call [phone]. Thank you.

## 2020-03-31 NOTE — TELEPHONE ENCOUNTER
----- Message from Heidi Mcneal sent at 3/31/2020  2:38 PM CDT -----  Contact: Pt   Pt called in regards to speaking with the staff in regards to going to the hospital 03/25/2020. Pt was told that she needs to f/u with provider.Pt wanted to see what she should do in regards to a f/u. Pt stated that she is a lot better . Pt can be reached at [phone] (home).

## 2020-03-31 NOTE — TELEPHONE ENCOUNTER
Pain on right side, CT, labs done. Was told to follow up- pt states she was put on Hyrdocodone also given prednisone-has not taken in a long time and kind of nervous. Is feeling better but had a little pain today. Can't take a deep breath bend over too much it starts to hurt. Wanted to let us know.

## 2020-04-01 NOTE — TELEPHONE ENCOUNTER
Pain is in right side between ribs and hip and it hurts when bending. States that she had to do this in the CT scan. Did a urine as well. No results were given, please advise.

## 2020-04-06 ENCOUNTER — TELEPHONE (OUTPATIENT)
Dept: INTERNAL MEDICINE | Facility: CLINIC | Age: 65
End: 2020-04-06

## 2020-04-06 NOTE — TELEPHONE ENCOUNTER
Informed no colonoscopy showing, informed it might have been somewhere else. Verbalized understanding.

## 2020-04-06 NOTE — TELEPHONE ENCOUNTER
----- Message from Deysi Mora sent at 4/6/2020  2:51 PM CDT -----  Type:  Needs Medical Advice    Who Called:  Pt  Nadia  Symptoms (please be specific):  Pt is calling to ask when she had her last colonoscopy done//she had a CT done because of abdominal pain//   How long has patient had these symptoms:     Pharmacy name and phone #:     Would the patient rather a call back or a response via [facility]?    Call back  Best Call Back Number:    [phone]  Additional Information:   Please call to inform//thanks/jennie

## 2020-04-16 ENCOUNTER — TELEPHONE (OUTPATIENT)
Dept: TRANSPLANT | Facility: CLINIC | Age: 65
End: 2020-04-16

## 2020-04-16 NOTE — TELEPHONE ENCOUNTER
Spoke with pt, she is doing well, staying safe and isolated.  Will plan on annual in June or July.

## 2020-05-05 ENCOUNTER — LAB VISIT (OUTPATIENT)
Dept: INTERNAL MEDICINE | Facility: CLINIC | Age: 65
End: 2020-05-05
Payer: MEDICARE

## 2020-05-05 ENCOUNTER — TELEPHONE (OUTPATIENT)
Dept: INTERNAL MEDICINE | Facility: CLINIC | Age: 65
End: 2020-05-05

## 2020-05-05 DIAGNOSIS — Z20.822 SUSPECTED COVID-19 VIRUS INFECTION: ICD-10-CM

## 2020-05-05 DIAGNOSIS — Z20.822 SUSPECTED COVID-19 VIRUS INFECTION: Primary | ICD-10-CM

## 2020-05-05 PROCEDURE — U0002 COVID-19 LAB TEST NON-CDC: HCPCS | Mod: HCNC

## 2020-05-05 NOTE — TELEPHONE ENCOUNTER
Spoke with pt, states that she would like an order for the COVID test, states that she has been having body aches for a couple of days, fatigue, headaches. Is staying in a senior complex. Please advise.

## 2020-05-05 NOTE — TELEPHONE ENCOUNTER
----- Message from Qiana Abbott sent at 5/5/2020  1:37 PM CDT -----  Contact: Pt  Pt is requesting call back in regards to getting order for covid-19 test        Pls call back at [phone]

## 2020-05-07 ENCOUNTER — TELEPHONE (OUTPATIENT)
Dept: PEDIATRICS | Facility: CLINIC | Age: 65
End: 2020-05-07

## 2020-05-07 LAB — SARS-COV-2 RNA RESP QL NAA+PROBE: NOT DETECTED

## 2020-05-07 NOTE — TELEPHONE ENCOUNTER
Notes recorded by Richie Cassidy MD on 5/7/2020 at 11:25 AM CDT  COVID-19 test is normal and negative  Patient notified of results with verbalized understanding.

## 2020-05-07 NOTE — TELEPHONE ENCOUNTER
----- Message from Kaylyn Gamez sent at 5/7/2020  3:54 PM CDT -----  Contact: pt   .Type:  Patient Returning Call    Who Called:pt  Who Left Message for Patient:nurse  Does the patient know what this is regarding?:results  Would the patient rather a call back or a response via [facility]? Call back  Best Call Back Number:[phone]  Additional Information:

## 2020-05-08 DIAGNOSIS — M79.7 FIBROMYALGIA: ICD-10-CM

## 2020-05-08 RX ORDER — PREGABALIN 50 MG/1
50 CAPSULE ORAL 2 TIMES DAILY
Qty: 60 CAPSULE | Refills: 4 | Status: SHIPPED | OUTPATIENT
Start: 2020-05-08 | End: 2020-10-08 | Stop reason: SDUPTHER

## 2020-05-08 NOTE — TELEPHONE ENCOUNTER
----- Message from Loli Head sent at 5/8/2020 12:58 PM CDT -----  Contact: pt  1. What is the name of the medication you are requesting? lyrica   2. What is the dose? 50mg  3. How do you take the medication? Orally, topically, etc? .  4. How often do you take this medication? Twice a day  5. Do you need a 30 day or 90 day supply? 60  6. How many refills are you requesting? .  7. What is your preferred pharmacy and location of the pharmacy? [facility]  8. Who can we contact with further questions? [phone]

## 2020-05-11 ENCOUNTER — TELEPHONE (OUTPATIENT)
Dept: INTERNAL MEDICINE | Facility: CLINIC | Age: 65
End: 2020-05-11

## 2020-05-11 ENCOUNTER — TELEPHONE (OUTPATIENT)
Dept: RHEUMATOLOGY | Facility: CLINIC | Age: 65
End: 2020-05-11

## 2020-05-11 ENCOUNTER — PATIENT OUTREACH (OUTPATIENT)
Dept: ADMINISTRATIVE | Facility: OTHER | Age: 65
End: 2020-05-11

## 2020-05-11 NOTE — TELEPHONE ENCOUNTER
----- Message from Deysi Mora sent at 5/11/2020  2:15 PM CDT -----  Type:  RX Refill Request    Who Called:  Pt  Nadia  Refill or New Rx:    Refill   RX Name and Strength:    Pregadalin  50mg  How is the patient currently taking it? (ex. 1XDay):  Take one/twice daily  Is this a 30 day or 90 day RX:  30 day  Preferred Pharmacy with phone number:  [facility]  Local or Mail Order:  Local  Ordering Provider:  Dr Cassidy  Would the patient rather a call back or a response via [facility]?   Call back  Best Call Back Number:   [phone]  Additional Information:   Pt is out of the med//please call asap//racquel/jennie

## 2020-05-11 NOTE — TELEPHONE ENCOUNTER
Mail pharmacy called and canceled Rx. New Rx sent to local pharmacy for the same med refill from last week. Informed her that med was sent to local pharmacy, Verbalized understanding.

## 2020-05-12 ENCOUNTER — OFFICE VISIT (OUTPATIENT)
Dept: RHEUMATOLOGY | Facility: CLINIC | Age: 65
End: 2020-05-12
Payer: MEDICARE

## 2020-05-12 DIAGNOSIS — M81.0 OSTEOPOROSIS, UNSPECIFIED OSTEOPOROSIS TYPE, UNSPECIFIED PATHOLOGICAL FRACTURE PRESENCE: ICD-10-CM

## 2020-05-12 DIAGNOSIS — M81.8 OTHER OSTEOPOROSIS WITHOUT CURRENT PATHOLOGICAL FRACTURE: Primary | ICD-10-CM

## 2020-05-12 DIAGNOSIS — Z71.89 COUNSELING ON HEALTH PROMOTION AND DISEASE PREVENTION: ICD-10-CM

## 2020-05-12 DIAGNOSIS — M15.9 PRIMARY OSTEOARTHRITIS INVOLVING MULTIPLE JOINTS: Primary | ICD-10-CM

## 2020-05-12 PROCEDURE — 99442 PR PHYSICIAN TELEPHONE EVALUATION 11-20 MIN: ICD-10-PCS | Mod: HCNC,95,, | Performed by: INTERNAL MEDICINE

## 2020-05-12 PROCEDURE — 99999 PR PBB SHADOW E&M-EST. PATIENT-LVL I: CPT | Mod: PBBFAC,HCNC,, | Performed by: INTERNAL MEDICINE

## 2020-05-12 PROCEDURE — 99442 PR PHYSICIAN TELEPHONE EVALUATION 11-20 MIN: CPT | Mod: HCNC,95,, | Performed by: INTERNAL MEDICINE

## 2020-05-12 PROCEDURE — 99999 PR PBB SHADOW E&M-EST. PATIENT-LVL I: ICD-10-PCS | Mod: PBBFAC,HCNC,, | Performed by: INTERNAL MEDICINE

## 2020-05-12 NOTE — Clinical Note
Due for Prolia, last given 9/2019.  To administer as soon as possible per infusion schedule availability.  CMP prior next dose in 6 months, with follow-up visit with MD before appointment.

## 2020-05-12 NOTE — PROGRESS NOTES
RHEUMATOLOGY OUTPATIENT CLINIC NOTE    The patient location is: [place]  The chief complaint leading to consultation is:  Osteoarthritis and osteoporosis  Visit type: audio only, patient unable to initiate video conference call  Total time spent with patient: 15 mins  Each patient to whom he or she provides medical services by telemedicine is:  (1) informed of the relationship between the physician and patient and the respective role of any other health care provider with respect to management of the patient; and (2) notified that he or she may decline to receive medical services by telemedicine and may withdraw from such care at any time.    5/12/2020    Attending Rheumatologist: Prasanth Johnson  Primary Care Provider: Richie Cassidy MD   Physician Requesting Consultation: No referring provider defined for this encounter.  Chief Complaint/Reason For Consultation:  OA and OP.    Subjective:       HPI  Nadia Damon is a 64 y.o. Black or  female with osteopenia and osteoarthritis comes for follow-up.    Today  Last seen on November.  Recommendations provided for trochanteric bursa syndrome.  Patient refers adherence with range of motion and stretching exercises.  Disease has improved her pain significantly.  Refers episodic lower back pain radiation down her leg and exacerbation on trochanteric bursa syndrome.  Relieved by conservative measures.  Has not tried physical therapy.  Denies falls or fragility fractures since last visit.  Currently not planned for invasive dental procedures.    Review of Systems   Constitutional: Negative for chills, fever and malaise/fatigue.   Eyes: Negative for pain and redness.   Respiratory: Negative for cough, hemoptysis and shortness of breath.    Cardiovascular: Negative for chest pain and leg swelling.   Gastrointestinal: Negative for abdominal pain, blood in stool and melena.   Genitourinary: Negative for dysuria and hematuria.   Musculoskeletal: Negative for  falls and joint pain.   Skin: Negative for rash.   Neurological: Negative for tingling and focal weakness.   Psychiatric/Behavioral: Negative for memory loss. The patient does not have insomnia.      Chronic comorbid conditions affecting medical decision making today:  Past Medical History:   Diagnosis Date    Abdominal wall hernia     CT Renal 6/11/2018---Small fat containing superior ventral abdominal wall hernia at the epicardial pacing lead site.    Anxiety     Arthritis     Chronic kidney disease     Chronic midline low back pain without sciatica 6/18/2018    Coronary artery disease     Depression     Fibromyalgia     on Lyrica    Heart failure     native heart cardiomyopathy    Heart transplanted 1993    due to cardiomyopathy    History of hyperparathyroidism; Hyperparathyroidism, secondary renal     Hypertension     Immune disorder     anti rejection meds    Iron deficiency anemia 8/15/2017    Kidney stones     passed per pt    Obesity     Other osteoporosis without current pathological fracture 8/30/2019    Shingles 2003 approx    left leg    Trouble in sleeping     Urinary incontinence      Past Surgical History:   Procedure Laterality Date    BLADDER SURGERY  2015 approx    mesh - Dr Everett then 2nd reconstructive sx Dr Onofre    BREAST SURGERY Left 9/28/15    Bx - benign    BREAST SURGERY Right 12/2015    Bx benign    CARDIAC PACEMAKER REMOVAL Left 06/26/14    Pacer defirillator removed. Put in 1993 aat time of heart transplant    CARPAL TUNNEL RELEASE Left 03/03/15    Dr. Hall    HEART TRANSPLANT  1993    HERNIA REPAIR Right 1971 approx    Inguinal    HYSTERECTOMY  1983    vag hyst /LSO     TOE SURGERY       Family History   Problem Relation Age of Onset    Cancer Mother 38        breast    Breast cancer Mother     Heart disease Maternal Grandmother     Cataracts Cousin     Hypertension Son     Diabetes Neg Hx     Stroke Neg Hx     Kidney disease Neg Hx      Asthma Neg Hx     COPD Neg Hx     Melanoma Neg Hx      Social History     Substance and Sexual Activity   Alcohol Use No    Alcohol/week: 0.0 standard drinks     Social History     Tobacco Use   Smoking Status Never Smoker   Smokeless Tobacco Never Used     Social History     Substance and Sexual Activity   Drug Use No       Current Outpatient Medications:     acetaminophen 500 mg/15 mL Liqd, , Disp: , Rfl:     amLODIPine (NORVASC) 2.5 MG tablet, Take 1 tablet (2.5 mg total) by mouth once daily., Disp: 30 tablet, Rfl: 11    ammonium lactate (AMLACTIN) 12 % lotion, Use daily.  Apply to damp skin after bathing. (Patient not taking: Reported on 2/18/2020), Disp: 225 g, Rfl: 11    aspirin (ECOTRIN) 81 MG EC tablet, Take 1 tablet (81 mg total) by mouth once daily., Disp: 30 tablet, Rfl: 11    baclofen (LIORESAL) 10 MG tablet, TAKE 1 TABLET THREE TIMES DAILY AS NEEDED FOR MUSCLE SPASMS, Disp: 270 tablet, Rfl: 0    benzocaine-menthol 15-2.6 mg Lozg, , Disp: , Rfl:     biotin 10,000 mcg Cap, Take 1 tablet by mouth once daily., Disp: , Rfl:     busPIRone (BUSPAR) 10 MG tablet, TAKE 1 TABLET EVERY DAY, Disp: 90 tablet, Rfl: 1    clobetasol (TEMOVATE) 0.05 % external solution, Apply topically 2 (two) times daily. Apply to the scalp. (Patient not taking: Reported on 2/18/2020), Disp: 50 mL, Rfl: 2    cycloSPORINE modified, NEORAL, (NEORAL) 100 MG capsule, TAKE 1 CAPSULE EVERY DAY, Disp: 90 capsule, Rfl: 0    cycloSPORINE modified, NEORAL, (NEORAL) 25 MG capsule, TAKE 3 CAPSULES ONCE A DAY, Disp: 270 capsule, Rfl: 0    DULoxetine (CYMBALTA) 30 MG capsule, TAKE 1 CAPSULE EVERY DAY, Disp: 90 capsule, Rfl: 1    estradiol (ESTRACE) 0.01 % (0.1 mg/gram) vaginal cream, Place 1 g vaginally twice a week., Disp: 1 Tube, Rfl: 12    EVENING PRIMROSE OIL ORAL, Take 1,000 mg by mouth once daily., Disp: , Rfl:     ferrous sulfate (FEOSOL) 325 mg (65 mg iron) Tab tablet, Take 1 tablet (325 mg total) by mouth once daily.,  Disp: 100 tablet, Rfl: 3    fluticasone (FLONASE) 50 mcg/actuation nasal spray, 2 sprays by Each Nare route once daily., Disp: 1 Bottle, Rfl: 0    furosemide (LASIX) 20 MG tablet, Take 1/2 (10 mg) po qd, Disp: 30 tablet, Rfl: 11    HYDROcodone-acetaminophen (NORCO) 5-325 mg per tablet, Take 1 tablet by mouth every 4 (four) hours as needed., Disp: 30 tablet, Rfl: 0    ketoconazole (NIZORAL) 2 % shampoo, Apply topically 3 (three) times a week., Disp: 120 mL, Rfl: 5    leg brace (ANKLE SUPPORT MISC), , Disp: , Rfl:     metoprolol succinate (TOPROL-XL) 25 MG 24 hr tablet, TAKE 1 TABLET EVERY DAY, Disp: 90 tablet, Rfl: 3    mometasone (ELOCON) 0.1 % lotion, Apply topically once daily., Disp: 180 mL, Rfl: 3    multivitamin capsule, Take 1 capsule by mouth once daily., Disp: , Rfl:     omeprazole (PRILOSEC) 10 MG capsule, TAKE 1 CAPSULE EVERY DAY, Disp: 90 capsule, Rfl: 0    ondansetron (ZOFRAN) 4 MG tablet, Take 1 tablet (4 mg total) by mouth every 6 (six) hours., Disp: 30 tablet, Rfl: 0    predniSONE (DELTASONE) 20 MG tablet, Take 1 tablet (20 mg total) by mouth once daily., Disp: 7 tablet, Rfl: 0    pregabalin (LYRICA) 50 MG capsule, Take 1 capsule (50 mg total) by mouth 2 (two) times daily., Disp: 60 capsule, Rfl: 4    temazepam (RESTORIL) 30 mg capsule, TAKE 1 CAPSULE BY MOUTH AT BEDTIME, Disp: 30 capsule, Rfl: 2    valACYclovir (VALTREX) 1000 MG tablet, Take 1 tablet (1,000 mg total) by mouth once daily., Disp: 5 tablet, Rfl: 0    vitamin E 400 UNIT capsule, Take 400 Units by mouth once daily., Disp: , Rfl:     zolpidem (AMBIEN) 10 mg Tab, TAKE 1 TABLET BY MOUTH ONCE DAILY IN THE EVENING, Disp: 90 tablet, Rfl: 1    Current Facility-Administered Medications:     denosumab (PROLIA) injection 60 mg, 60 mg, Subcutaneous, Q6 Months, Prasanth Johnson MD    triamcinolone acetonide injection 10 mg, 10 mg, Intradermal, 1 time in Clinic/HOD, Jose Roland MD     Objective:         There were no vitals filed  "for this visit.  Physical Exam   Constitutional:   Estimated body mass index is 29.72 kg/m² as calculated from the following:    Height as of 3/25/20: 5' 2" (1.575 m).    Weight as of 3/25/20: 73.7 kg (162 lb 7.7 oz).    Wt Readings from Last 1 Encounters:  03/25/20 1825 : 73.7 kg (162 lb 7.7 oz)       Reviewed old and all outside pertinent medical records available.    All lab results personally reviewed and interpreted by me.  Lab Results   Component Value Date    WBC 4.96 03/25/2020    HGB 11.6 (L) 03/25/2020    HCT 36.9 (L) 03/25/2020    MCV 90 03/25/2020    MCH 28.3 03/25/2020    MCHC 31.4 (L) 03/25/2020    RDW 13.9 03/25/2020     03/25/2020    MPV 9.2 03/25/2020    PLTEST Normal 08/02/2007       Lab Results   Component Value Date     03/25/2020    K 4.1 03/25/2020     03/25/2020    CO2 24 03/25/2020     (H) 03/25/2020    BUN 33 (H) 03/25/2020    CALCIUM 9.6 03/25/2020    PROT 8.7 (H) 03/25/2020    ALBUMIN 3.5 03/25/2020    BILITOT 0.6 03/25/2020    AST 43 (H) 03/25/2020    ALKPHOS 116 03/25/2020    ALT 30 03/25/2020       Lab Results   Component Value Date    COLORU Yellow 03/25/2020    APPEARANCEUA Clear 03/25/2020    SPECGRAV 1.010 03/25/2020    PHUR 6.0 03/25/2020    PROTEINUA 1+ (A) 03/25/2020    KETONESU Negative 03/25/2020    LEUKOCYTESUR Negative 03/25/2020    NITRITE Negative 03/25/2020    UROBILINOGEN Negative 03/25/2020       Lab Results   Component Value Date    CRP 14.2 (H) 03/25/2020       No results found for: SEDRATE, ERYTHROCYTES    No results found for: JONNATHAN, RF, SEDRATE    No components found for: 25OHVITDTOT, 38YMMEAR8, 47RTZBAQ5, METHODNOTE    Lab Results   Component Value Date    URICACID 6.5 (H) 05/28/2019       No components found for: TSPOTTB    · PTH: 183-> 212 (5/2019)    Rheum Labs:  n/a     Infectious Labs:  Hepatitis profile nonreactive 2007     Imaging:  All imaging reviewed and independently  interpreted by me.    X-ray wrists June 2014   Intra-articular " fracture the distal radius.  Avulsion fracture of the ulnar styloid.    Chest x-ray February 2020  No significant bony findings.    CT renal study March 2020  Moderate degenerative changes throughout the lower cervical spine including moderate severe facet arthropathy.    DEXA scan  July 2019  FN: -0.5  LS: -1.1  FRAX: 0.5% hip / 8.4% major     ASSESSMENT / PLAN:     Nadia Damon is a 64 y.o. Black or  female with:    1. Osteoarthritis  - previously with features of trochanteric bursa syndrome, resolved  - Discussed and recommended exercise (jacques non wt-bearing/swimming)  - range of motion, flexibility, and strengthening exercises PRN  - Acetaminophen prn up to 3 g per day.  - Topicals therapy: Capsaicin  - consider for corticosteroid injections    2. Osteoporosis  - two isolated episodes of fragility fracture distal radius and ulna likely represent osteoporotic fracture.  - chronic cyclosporin use, osteopenia, hyperparathyroidism, CKD IV  - received Prolia on September, due for repeat dose.  Will continue every 6 months  - CMP prior Prolia administration to monitor calcium level  - continue adequate dietary calcium and vitamin D intake.  - Avoid immobility, fall prevention     3. Other specified counseling  - Nutrition and exercise counseling.  - Limitation of alcohol consumption.  - Regular exercise:  Aerobic and resistance.  - Medication counseling provided.    Follow up in about 6 months (around 11/12/2020).    Method of contact with patient concerns: [facility]    Disclaimer:  This note is prepared using voice recognition software and as such is likely to have errors and has not been proof read. Please contact me for questions.     Prasanth Johnson M.D.  Rheumatology Department   [facility] [place]

## 2020-05-13 ENCOUNTER — INFUSION (OUTPATIENT)
Dept: INFUSION THERAPY | Facility: HOSPITAL | Age: 65
End: 2020-05-13
Attending: INTERNAL MEDICINE
Payer: MEDICARE

## 2020-05-13 ENCOUNTER — LAB VISIT (OUTPATIENT)
Dept: LAB | Facility: HOSPITAL | Age: 65
End: 2020-05-13
Attending: INTERNAL MEDICINE
Payer: MEDICARE

## 2020-05-13 VITALS
RESPIRATION RATE: 18 BRPM | TEMPERATURE: 98 F | BODY MASS INDEX: 29.98 KG/M2 | SYSTOLIC BLOOD PRESSURE: 147 MMHG | DIASTOLIC BLOOD PRESSURE: 99 MMHG | WEIGHT: 162.94 LBS | HEART RATE: 99 BPM | OXYGEN SATURATION: 98 % | HEIGHT: 62 IN

## 2020-05-13 DIAGNOSIS — M81.8 OTHER OSTEOPOROSIS WITHOUT CURRENT PATHOLOGICAL FRACTURE: ICD-10-CM

## 2020-05-13 DIAGNOSIS — M81.8 OTHER OSTEOPOROSIS WITHOUT CURRENT PATHOLOGICAL FRACTURE: Primary | ICD-10-CM

## 2020-05-13 LAB
ALBUMIN SERPL BCP-MCNC: 3.3 G/DL (ref 3.5–5.2)
ALP SERPL-CCNC: 130 U/L (ref 55–135)
ALT SERPL W/O P-5'-P-CCNC: 22 U/L (ref 10–44)
ANION GAP SERPL CALC-SCNC: 10 MMOL/L (ref 8–16)
AST SERPL-CCNC: 29 U/L (ref 10–40)
BILIRUB SERPL-MCNC: 0.5 MG/DL (ref 0.1–1)
BUN SERPL-MCNC: 19 MG/DL (ref 8–23)
CALCIUM SERPL-MCNC: 9.3 MG/DL (ref 8.7–10.5)
CHLORIDE SERPL-SCNC: 103 MMOL/L (ref 95–110)
CO2 SERPL-SCNC: 29 MMOL/L (ref 23–29)
CREAT SERPL-MCNC: 1.6 MG/DL (ref 0.5–1.4)
EST. GFR  (AFRICAN AMERICAN): 39 ML/MIN/1.73 M^2
EST. GFR  (NON AFRICAN AMERICAN): 34 ML/MIN/1.73 M^2
GLUCOSE SERPL-MCNC: 123 MG/DL (ref 70–110)
POTASSIUM SERPL-SCNC: 4.7 MMOL/L (ref 3.5–5.1)
PROT SERPL-MCNC: 8 G/DL (ref 6–8.4)
SODIUM SERPL-SCNC: 142 MMOL/L (ref 136–145)

## 2020-05-13 PROCEDURE — 80053 COMPREHEN METABOLIC PANEL: CPT | Mod: HCNC

## 2020-05-13 PROCEDURE — 36415 COLL VENOUS BLD VENIPUNCTURE: CPT | Mod: HCNC

## 2020-05-13 PROCEDURE — 63600175 PHARM REV CODE 636 W HCPCS: Mod: JG,HCNC | Performed by: INTERNAL MEDICINE

## 2020-05-13 PROCEDURE — 96372 THER/PROPH/DIAG INJ SC/IM: CPT | Mod: HCNC

## 2020-05-13 RX ADMIN — DENOSUMAB 60 MG: 60 INJECTION SUBCUTANEOUS at 10:05

## 2020-05-13 NOTE — PLAN OF CARE
"  Problem: Adult Inpatient Plan of Care  Goal: Plan of Care Review  Outcome: Ongoing, Progressing  Goal: Patient-Specific Goal (Individualization)  Outcome: Ongoing, Progressing  Flowsheets (Taken 5/13/2020 1436)  Individualized Care Needs: Likes her injection in her abdomen.  Anxieties, Fears or Concerns: Patient states that she wants her life to return to normal.  Patient-Specific Goals (Include Timeframe): Patient will verbalize her feelings about the "New Normal."  Goal: Absence of Hospital-Acquired Illness or Injury  Outcome: Ongoing, Progressing  Goal: Optimal Comfort and Wellbeing  Outcome: Ongoing, Progressing  Intervention: Provide Person-Centered Care  Flowsheets (Taken 5/13/2020 1436)  Trust Relationship/Rapport: care explained; choices provided; emotional support provided; empathic listening provided; questions answered; questions encouraged; reassurance provided; thoughts/feelings acknowledged   Provide calm environment. Provide privacy.   Explain POT.  Given printed material on Covid 19.  Taught on frequent hand-washing and social distancing.    "

## 2020-05-13 NOTE — NURSING
Printed information regarding Prolia given to pt. Instructed on s/s to report. Verbalized understanding.  Administered Prolia 60 mg/ml SQ in right abdomen  Instructed to wait in clinic x 15 min. For monitoring.

## 2020-05-25 DIAGNOSIS — M62.838 MUSCLE SPASMS OF BOTH LOWER EXTREMITIES: ICD-10-CM

## 2020-05-25 DIAGNOSIS — Z94.1 HEART TRANSPLANTED: Chronic | ICD-10-CM

## 2020-05-25 RX ORDER — CYCLOSPORINE 25 MG/1
CAPSULE, LIQUID FILLED ORAL
Qty: 270 CAPSULE | Refills: 0 | Status: SHIPPED | OUTPATIENT
Start: 2020-05-25 | End: 2020-09-10

## 2020-05-25 RX ORDER — OMEPRAZOLE 10 MG/1
CAPSULE, DELAYED RELEASE ORAL
Qty: 90 CAPSULE | Refills: 0 | Status: SHIPPED | OUTPATIENT
Start: 2020-05-25 | End: 2020-09-10

## 2020-05-25 RX ORDER — CYCLOSPORINE 100 MG/1
CAPSULE, LIQUID FILLED ORAL
Qty: 90 CAPSULE | Refills: 0 | Status: SHIPPED | OUTPATIENT
Start: 2020-05-25 | End: 2020-09-10

## 2020-05-25 RX ORDER — BACLOFEN 10 MG/1
TABLET ORAL
Qty: 270 TABLET | Refills: 0 | Status: SHIPPED | OUTPATIENT
Start: 2020-05-25 | End: 2020-09-10

## 2020-05-28 ENCOUNTER — PATIENT OUTREACH (OUTPATIENT)
Dept: ADMINISTRATIVE | Facility: OTHER | Age: 65
End: 2020-05-28

## 2020-05-28 ENCOUNTER — OFFICE VISIT (OUTPATIENT)
Dept: INTERNAL MEDICINE | Facility: CLINIC | Age: 65
End: 2020-05-28
Payer: MEDICARE

## 2020-05-28 ENCOUNTER — TELEPHONE (OUTPATIENT)
Dept: INTERNAL MEDICINE | Facility: CLINIC | Age: 65
End: 2020-05-28

## 2020-05-28 VITALS
WEIGHT: 164.25 LBS | BODY MASS INDEX: 30.22 KG/M2 | OXYGEN SATURATION: 99 % | SYSTOLIC BLOOD PRESSURE: 136 MMHG | DIASTOLIC BLOOD PRESSURE: 88 MMHG | HEIGHT: 62 IN | TEMPERATURE: 97 F | HEART RATE: 109 BPM

## 2020-05-28 DIAGNOSIS — I10 ESSENTIAL HYPERTENSION: ICD-10-CM

## 2020-05-28 DIAGNOSIS — J01.21 ACUTE RECURRENT ETHMOIDAL SINUSITIS: Primary | ICD-10-CM

## 2020-05-28 DIAGNOSIS — J02.9 PHARYNGITIS, UNSPECIFIED ETIOLOGY: ICD-10-CM

## 2020-05-28 DIAGNOSIS — D84.9 IMMUNOCOMPROMISED PATIENT: ICD-10-CM

## 2020-05-28 PROCEDURE — 99999 PR PBB SHADOW E&M-EST. PATIENT-LVL V: ICD-10-PCS | Mod: PBBFAC,HCNC,, | Performed by: FAMILY MEDICINE

## 2020-05-28 PROCEDURE — 3079F DIAST BP 80-89 MM HG: CPT | Mod: HCNC,CPTII,S$GLB, | Performed by: FAMILY MEDICINE

## 2020-05-28 PROCEDURE — 3079F PR MOST RECENT DIASTOLIC BLOOD PRESSURE 80-89 MM HG: ICD-10-PCS | Mod: HCNC,CPTII,S$GLB, | Performed by: FAMILY MEDICINE

## 2020-05-28 PROCEDURE — 1101F PR PT FALLS ASSESS DOC 0-1 FALLS W/OUT INJ PAST YR: ICD-10-PCS | Mod: HCNC,CPTII,S$GLB, | Performed by: FAMILY MEDICINE

## 2020-05-28 PROCEDURE — 3008F BODY MASS INDEX DOCD: CPT | Mod: HCNC,CPTII,S$GLB, | Performed by: FAMILY MEDICINE

## 2020-05-28 PROCEDURE — 3075F SYST BP GE 130 - 139MM HG: CPT | Mod: HCNC,CPTII,S$GLB, | Performed by: FAMILY MEDICINE

## 2020-05-28 PROCEDURE — 3075F PR MOST RECENT SYSTOLIC BLOOD PRESS GE 130-139MM HG: ICD-10-PCS | Mod: HCNC,CPTII,S$GLB, | Performed by: FAMILY MEDICINE

## 2020-05-28 PROCEDURE — 1101F PT FALLS ASSESS-DOCD LE1/YR: CPT | Mod: HCNC,CPTII,S$GLB, | Performed by: FAMILY MEDICINE

## 2020-05-28 PROCEDURE — 99213 PR OFFICE/OUTPT VISIT, EST, LEVL III, 20-29 MIN: ICD-10-PCS | Mod: HCNC,S$GLB,, | Performed by: FAMILY MEDICINE

## 2020-05-28 PROCEDURE — 99999 PR PBB SHADOW E&M-EST. PATIENT-LVL V: CPT | Mod: PBBFAC,HCNC,, | Performed by: FAMILY MEDICINE

## 2020-05-28 PROCEDURE — 99213 OFFICE O/P EST LOW 20 MIN: CPT | Mod: HCNC,S$GLB,, | Performed by: FAMILY MEDICINE

## 2020-05-28 PROCEDURE — 3008F PR BODY MASS INDEX (BMI) DOCUMENTED: ICD-10-PCS | Mod: HCNC,CPTII,S$GLB, | Performed by: FAMILY MEDICINE

## 2020-05-28 NOTE — TELEPHONE ENCOUNTER
----- Message from Mireya Burk sent at 5/28/2020  3:20 PM CDT -----  Contact: pt  Att: Richard Carter called to provide the name of antibiotics  Valacyclovir 1xday  1000mg 5 tablets ... Call back : [phone] (home)

## 2020-05-28 NOTE — PROGRESS NOTES
Subjective:       Patient ID: Nadia Damon is a 64 y.o. female.    Chief Complaint: Sore Throat and Otalgia    About 6 weeks ago she was treated with azithromycin x3 days sinusitis and left pharyngitis.  The symptoms resolved.  Recently she has had some postnasal drip usually in the morning right sore throat and fullness in the right ear.  She denies fever chills.  She denies cough.    Review of Systems   Constitutional: Negative for chills and fever.   HENT: Positive for postnasal drip and sore throat. Negative for congestion and sinus pressure.    Respiratory: Negative for cough and shortness of breath.    Cardiovascular: Negative for chest pain, palpitations and leg swelling.   Gastrointestinal: Negative for diarrhea and nausea.       Objective:      Physical Exam   Constitutional: She appears well-developed and well-nourished. No distress.   HENT:   Right Ear: External ear normal.   Left Ear: External ear normal.   Nose: Nose normal.   Mouth/Throat: Oropharynx is clear and moist.   Mild tenderness right submaxillary gland with no swelling  mild erythema posterior pharynx   Eyes: Conjunctivae are normal.   Neck: No thyromegaly present.   Cardiovascular: Normal rate and regular rhythm.   Pulmonary/Chest: Effort normal. No respiratory distress. She has no wheezes. She has no rales.   Lymphadenopathy:     She has no cervical adenopathy.   Skin: She is not diaphoretic.       Lab Visit on 05/13/2020   Component Date Value Ref Range Status    Sodium 05/13/2020 142  136 - 145 mmol/L Final    Potassium 05/13/2020 4.7  3.5 - 5.1 mmol/L Final    Chloride 05/13/2020 103  95 - 110 mmol/L Final    CO2 05/13/2020 29  23 - 29 mmol/L Final    Glucose 05/13/2020 123* 70 - 110 mg/dL Final    BUN, Bld 05/13/2020 19  8 - 23 mg/dL Final    Creatinine 05/13/2020 1.6* 0.5 - 1.4 mg/dL Final    Calcium 05/13/2020 9.3  8.7 - 10.5 mg/dL Final    Total Protein 05/13/2020 8.0  6.0 - 8.4 g/dL Final    Albumin 05/13/2020 3.3* 3.5  - 5.2 g/dL Final    Total Bilirubin 05/13/2020 0.5  0.1 - 1.0 mg/dL Final    Alkaline Phosphatase 05/13/2020 130  55 - 135 U/L Final    AST 05/13/2020 29  10 - 40 U/L Final    ALT 05/13/2020 22  10 - 44 U/L Final    Anion Gap 05/13/2020 10  8 - 16 mmol/L Final    eGFR if African American 05/13/2020 39* >60 mL/min/1.73 m^2 Final    eGFR if non African American 05/13/2020 34* >60 mL/min/1.73 m^2 Final     Assessment:       No diagnosis found.    Plan:     Probable sinusitis.  She has an antibiotic at home that she has not yet started.  Call back with anemia medication.  Increase fluids.  Saline gargles.  Follow-up 1 week if needed  CBC reviewed with normal white blood cell count recently  There are no diagnoses linked to this encounter.

## 2020-05-29 RX ORDER — VALACYCLOVIR HYDROCHLORIDE 1 G/1
1000 TABLET, FILM COATED ORAL DAILY
Qty: 5 TABLET | Refills: 0 | Status: SHIPPED | OUTPATIENT
Start: 2020-05-29 | End: 2020-08-05

## 2020-06-01 ENCOUNTER — TELEPHONE (OUTPATIENT)
Dept: TRANSPLANT | Facility: CLINIC | Age: 65
End: 2020-06-01

## 2020-06-01 DIAGNOSIS — T86.20 COMPLICATION OF HEART TRANSPLANT, UNSPECIFIED COMPLICATION: ICD-10-CM

## 2020-06-01 DIAGNOSIS — Z79.899 ENCOUNTER FOR LONG-TERM (CURRENT) USE OF MEDICATIONS: ICD-10-CM

## 2020-06-01 DIAGNOSIS — Z94.1 STATUS POST HEART TRANSPLANT: ICD-10-CM

## 2020-06-18 ENCOUNTER — TELEPHONE (OUTPATIENT)
Dept: INTERNAL MEDICINE | Facility: CLINIC | Age: 65
End: 2020-06-18

## 2020-06-18 NOTE — TELEPHONE ENCOUNTER
----- Message from Nella House sent at 6/18/2020 12:37 PM CDT -----  Contact: patient [phone]  Patient called to ask if she can come in for a blood pressure check because she is having headaches and her blood pressure readings are averaging from 142/ 100 and once had a reading of 142/109. She called her cardiologist and has not had a response since Monday.    You have no appointments until 6/30 so patient would like  to have a nurse visit to check her blood pressure and calibrate her blood pressure. Please call patient asap

## 2020-06-18 NOTE — TELEPHONE ENCOUNTER
Pt states that her BP is running high- 140s/90s and 150s-100s. Sitting 15 minutes before checking and waits at least or over an hour and it ws 133/92. It seems to be going down before lunch but jumps back up in the evening time. Can not get in touch with Cardiology. Would like to know what to do for her BP, no apt available until 2 weeks. Please advise.

## 2020-07-07 ENCOUNTER — HOSPITAL ENCOUNTER (OUTPATIENT)
Dept: CARDIOLOGY | Facility: HOSPITAL | Age: 65
Discharge: HOME | End: 2020-07-07
Attending: INTERNAL MEDICINE
Payer: MEDICARE

## 2020-07-07 ENCOUNTER — OFFICE VISIT (OUTPATIENT)
Dept: TRANSPLANT | Facility: CLINIC | Age: 65
End: 2020-07-07
Payer: MEDICARE

## 2020-07-07 VITALS
SYSTOLIC BLOOD PRESSURE: 160 MMHG | HEART RATE: 84 BPM | WEIGHT: 171.5 LBS | DIASTOLIC BLOOD PRESSURE: 88 MMHG | BODY MASS INDEX: 31.56 KG/M2 | HEIGHT: 62 IN

## 2020-07-07 VITALS
BODY MASS INDEX: 30.18 KG/M2 | WEIGHT: 164 LBS | DIASTOLIC BLOOD PRESSURE: 88 MMHG | SYSTOLIC BLOOD PRESSURE: 136 MMHG | HEIGHT: 62 IN

## 2020-07-07 DIAGNOSIS — E78.2 MIXED HYPERLIPIDEMIA: ICD-10-CM

## 2020-07-07 DIAGNOSIS — Z79.899 ENCOUNTER FOR LONG-TERM (CURRENT) USE OF MEDICATIONS: ICD-10-CM

## 2020-07-07 DIAGNOSIS — N18.4 CKD (CHRONIC KIDNEY DISEASE) STAGE 4, GFR 15-29 ML/MIN: ICD-10-CM

## 2020-07-07 DIAGNOSIS — Z79.60 LONG-TERM USE OF IMMUNOSUPPRESSANT MEDICATION: ICD-10-CM

## 2020-07-07 DIAGNOSIS — Z94.1 STATUS POST HEART TRANSPLANT: ICD-10-CM

## 2020-07-07 DIAGNOSIS — I10 ESSENTIAL HYPERTENSION: ICD-10-CM

## 2020-07-07 DIAGNOSIS — T86.20 COMPLICATION OF HEART TRANSPLANT, UNSPECIFIED COMPLICATION: ICD-10-CM

## 2020-07-07 DIAGNOSIS — Z94.1 HEART TRANSPLANTED: Primary | Chronic | ICD-10-CM

## 2020-07-07 LAB
AORTIC ROOT ANNULUS: 3.17 CM
ASCENDING AORTA: 2.88 CM
AV INDEX (PROSTH): 0.82
AV MEAN GRADIENT: 3 MMHG
AV PEAK GRADIENT: 5 MMHG
AV VALVE AREA: 2.54 CM2
AV VELOCITY RATIO: 0.7
BSA FOR ECHO PROCEDURE: 1.8 M2
CV ECHO LV RWT: 0.63 CM
DOP CALC AO PEAK VEL: 1.11 M/S
DOP CALC AO VTI: 23.18 CM
DOP CALC LVOT AREA: 3.1 CM2
DOP CALC LVOT DIAMETER: 1.99 CM
DOP CALC LVOT PEAK VEL: 0.78 M/S
DOP CALC LVOT STROKE VOLUME: 58.82 CM3
DOP CALC RVOT PEAK VEL: 0.44 M/S
DOP CALC RVOT VTI: 8.78 CM
DOP CALCLVOT PEAK VEL VTI: 18.92 CM
E WAVE DECELERATION TIME: 122.38 MSEC
E/A RATIO: 1.36
ECHO LV POSTERIOR WALL: 1.17 CM (ref 0.6–1.1)
FRACTIONAL SHORTENING: 38 % (ref 28–44)
INTERVENTRICULAR SEPTUM: 1.27 CM (ref 0.6–1.1)
IVRT: 59.98 MSEC
LA MAJOR: 4.15 CM
LA MINOR: 4.32 CM
LA WIDTH: 4.04 CM
LEFT ATRIUM SIZE: 4.2 CM
LEFT ATRIUM VOLUME INDEX: 34.8 ML/M2
LEFT ATRIUM VOLUME: 61.06 CM3
LEFT INTERNAL DIMENSION IN SYSTOLE: 2.3 CM (ref 2.1–4)
LEFT VENTRICLE DIASTOLIC VOLUME INDEX: 33.79 ML/M2
LEFT VENTRICLE DIASTOLIC VOLUME: 59.37 ML
LEFT VENTRICLE MASS INDEX: 87 G/M2
LEFT VENTRICLE SYSTOLIC VOLUME INDEX: 10.4 ML/M2
LEFT VENTRICLE SYSTOLIC VOLUME: 18.21 ML
LEFT VENTRICULAR INTERNAL DIMENSION IN DIASTOLE: 3.73 CM (ref 3.5–6)
LEFT VENTRICULAR MASS: 152.85 G
MV PEAK A VEL: 0.61 M/S
MV PEAK E VEL: 0.83 M/S
MV STENOSIS PRESSURE HALF TIME: 35.49 MS
MV VALVE AREA P 1/2 METHOD: 6.2 CM2
PISA TR MAX VEL: 2.79 M/S
PV MEAN GRADIENT: 0.42 MMHG
RA MAJOR: 3.22 CM
RA PRESSURE: 3 MMHG
RA WIDTH: 2.76 CM
RIGHT VENTRICULAR END-DIASTOLIC DIMENSION: 2.42 CM
SINUS: 3.06 CM
STJ: 2.95 CM
TR MAX PG: 31 MMHG
TRICUSPID ANNULAR PLANE SYSTOLIC EXCURSION: 1.21 CM
TV REST PULMONARY ARTERY PRESSURE: 34 MMHG

## 2020-07-07 PROCEDURE — 99499 UNLISTED E&M SERVICE: CPT | Mod: HCNC,S$GLB,, | Performed by: INTERNAL MEDICINE

## 2020-07-07 PROCEDURE — 93306 TTE W/DOPPLER COMPLETE: CPT | Mod: HCNC

## 2020-07-07 PROCEDURE — 3079F DIAST BP 80-89 MM HG: CPT | Mod: HCNC,CPTII,S$GLB, | Performed by: INTERNAL MEDICINE

## 2020-07-07 PROCEDURE — 99214 OFFICE O/P EST MOD 30 MIN: CPT | Mod: HCNC,S$GLB,, | Performed by: INTERNAL MEDICINE

## 2020-07-07 PROCEDURE — 99999 PR PBB SHADOW E&M-EST. PATIENT-LVL V: ICD-10-PCS | Mod: PBBFAC,HCNC,, | Performed by: INTERNAL MEDICINE

## 2020-07-07 PROCEDURE — 99214 PR OFFICE/OUTPT VISIT, EST, LEVL IV, 30-39 MIN: ICD-10-PCS | Mod: HCNC,S$GLB,, | Performed by: INTERNAL MEDICINE

## 2020-07-07 PROCEDURE — 99499 RISK ADDL DX/OHS AUDIT: ICD-10-PCS | Mod: HCNC,S$GLB,, | Performed by: INTERNAL MEDICINE

## 2020-07-07 PROCEDURE — 1101F PT FALLS ASSESS-DOCD LE1/YR: CPT | Mod: HCNC,CPTII,S$GLB, | Performed by: INTERNAL MEDICINE

## 2020-07-07 PROCEDURE — 93306 TRANSTHORACIC ECHO (TTE) COMPLETE (CUPID ONLY): ICD-10-PCS | Mod: 26,HCNC,, | Performed by: INTERNAL MEDICINE

## 2020-07-07 PROCEDURE — 3008F BODY MASS INDEX DOCD: CPT | Mod: HCNC,CPTII,S$GLB, | Performed by: INTERNAL MEDICINE

## 2020-07-07 PROCEDURE — 3079F PR MOST RECENT DIASTOLIC BLOOD PRESSURE 80-89 MM HG: ICD-10-PCS | Mod: HCNC,CPTII,S$GLB, | Performed by: INTERNAL MEDICINE

## 2020-07-07 PROCEDURE — 1101F PR PT FALLS ASSESS DOC 0-1 FALLS W/OUT INJ PAST YR: ICD-10-PCS | Mod: HCNC,CPTII,S$GLB, | Performed by: INTERNAL MEDICINE

## 2020-07-07 PROCEDURE — 93306 TTE W/DOPPLER COMPLETE: CPT | Mod: 26,HCNC,, | Performed by: INTERNAL MEDICINE

## 2020-07-07 PROCEDURE — 3074F PR MOST RECENT SYSTOLIC BLOOD PRESSURE < 130 MM HG: ICD-10-PCS | Mod: HCNC,CPTII,S$GLB, | Performed by: INTERNAL MEDICINE

## 2020-07-07 PROCEDURE — 3008F PR BODY MASS INDEX (BMI) DOCUMENTED: ICD-10-PCS | Mod: HCNC,CPTII,S$GLB, | Performed by: INTERNAL MEDICINE

## 2020-07-07 PROCEDURE — 3074F SYST BP LT 130 MM HG: CPT | Mod: HCNC,CPTII,S$GLB, | Performed by: INTERNAL MEDICINE

## 2020-07-07 PROCEDURE — 99999 PR PBB SHADOW E&M-EST. PATIENT-LVL V: CPT | Mod: PBBFAC,HCNC,, | Performed by: INTERNAL MEDICINE

## 2020-07-07 RX ORDER — HYDRALAZINE HYDROCHLORIDE 25 MG/1
25 TABLET, FILM COATED ORAL EVERY 8 HOURS
Qty: 90 TABLET | Refills: 11 | Status: SHIPPED | OUTPATIENT
Start: 2020-07-07 | End: 2020-07-23 | Stop reason: SDUPTHER

## 2020-07-07 RX ORDER — HYDRALAZINE HYDROCHLORIDE 25 MG/1
25 TABLET, FILM COATED ORAL EVERY 8 HOURS
Qty: 90 TABLET | Refills: 11 | Status: SHIPPED | OUTPATIENT
Start: 2020-07-07 | End: 2020-07-07 | Stop reason: SDUPTHER

## 2020-07-07 NOTE — PROGRESS NOTES
"Subjective:   Ms. Damon is a 64 y.o. year old Black or  female who received a  heart transplant on 5/27/93.          HPI  Ms. Damon is a very pleasant 62 y/o AAF s/p OHTx 5/27/;93 at [facility], s/p PPM same date, mild anemia and leukopenia, OA, cervical spine DJD with radiculopathy and chronic neck pain who presents for her 27th post annual transplant evaluation. Doing well. Has no complaints except for lower extremity edema. Had a echo done today that showed normal graft function with normal fillings pressures. RA pressure by IVC is 3 mm of Hg.     Review of Systems   Constitution: Negative. Negative for chills, decreased appetite, diaphoresis, fever, malaise/fatigue, night sweats, weight gain and weight loss.   Eyes: Negative.    Cardiovascular: Positive for leg swelling. Negative for chest pain, claudication, cyanosis, dyspnea on exertion, irregular heartbeat, near-syncope, orthopnea, palpitations, paroxysmal nocturnal dyspnea and syncope.   Respiratory: Negative for cough, hemoptysis and shortness of breath.    Endocrine: Negative.    Hematologic/Lymphatic: Negative.    Skin: Negative for color change, dry skin and nail changes.   Musculoskeletal: Negative.    Gastrointestinal: Negative.    Genitourinary: Negative.    Neurological: Negative for weakness.       Objective:   Blood pressure (!) 160/88, pulse 84, height 5' 2" (1.575 m), weight 77.8 kg (171 lb 8.3 oz), last menstrual period 06/20/1983.body mass index is 31.37 kg/m².    Physical Exam   Constitutional: She is oriented to person, place, and time. Vital signs are normal. She appears well-developed and well-nourished.   HENT:   Head: Normocephalic.   Eyes: Pupils are equal, round, and reactive to light.   Neck: Neck supple. No JVD present.   Cardiovascular: Regular rhythm and normal heart sounds. Tachycardia present. PMI is not displaced. Exam reveals no gallop and no friction rub.   No murmur heard.  Pulmonary/Chest: Effort " normal and breath sounds normal. No respiratory distress. She has no wheezes. She has no rales.   Abdominal: Soft. Bowel sounds are normal. She exhibits no distension. There is no abdominal tenderness. There is no rebound.   Musculoskeletal:         General: No edema.   Neurological: She is alert and oriented to person, place, and time.   Nursing note and vitals reviewed.      Lab Results   Component Value Date    WBC 3.24 (L) 07/07/2020    HGB 10.6 (L) 07/07/2020    HCT 34.2 (L) 07/07/2020    MCV 92 07/07/2020     07/07/2020    CO2 27 07/07/2020    CREATININE 1.6 (H) 07/07/2020    CALCIUM 8.2 (L) 07/07/2020    ALBUMIN 3.1 (L) 07/07/2020    AST 44 (H) 07/07/2020     (H) 07/07/2020    ALT 31 07/07/2020    .0 (H) 01/10/2020       Lab Results   Component Value Date    INR 1.0 01/07/2018    INR 1.0 05/17/2011    INR 1.1 05/24/2006       BNP   Date Value Ref Range Status   07/07/2020 209 (H) 0 - 99 pg/mL Final     Comment:     Values of less than 100 pg/ml are consistent with non-CHF populations.   09/12/2018 191 (H) 0 - 99 pg/mL Final     Comment:     Values of less than 100 pg/ml are consistent with non-CHF populations.   09/04/2018 271 (H) 0 - 99 pg/mL Final     Comment:     Values of less than 100 pg/ml are consistent with non-CHF populations.       LD   Date Value Ref Range Status   08/02/2007 315 (H) 110 - 260 U/L Final       Tacrolimus Lvl   Date Value Ref Range Status   06/09/2010 <1.5 (L) 5.0 - 15.0 ng/mL Final     No results found for: SIROLIMUS  Cyclosporine, LC/MS   Date Value Ref Range Status   06/10/2019 133 100 - 400 ng/mL Final     Comment:     Reference Normals:  For Kidney Transplants: 100-300 ng/mL  Testing performed by Liquid Chromatography-Tandem  Mass Spectrometry (LC-MS/MS).  This test was developed and its performance characteristics  determined by [facility], Department of Pathology  and Laboratory Medicine in a manner consistent with CLIA  requirements. It has  not been cleared or approved by the [place]  Food and Drug Administration.  This test is used for clinical  purposes.  It should not be regarded as investigational or for  research.         Assessment:     1. Heart transplanted    2. Long-term use of immunosuppressant medication    3. Essential hypertension    4. CKD (chronic kidney disease) stage 4, GFR 15-29 ml/min        Plan:   Clinically doing well.   Continue current immuno regimen   In view of her lower extremity edema with normal left and right side filling pressures on Echo will DC amlodipine and start Hydralazine 25 mg PO TID  for better BP control.   Due to her CKD she is not on ACEI/ARB  Use compressions stockings.   Up to date with mammograms and Dermatology for routine skin cancer screening but needs a screening colonoscopy.   RTC in 1 year    Return instructions as set forth by post transplant schedule or as needed:    Clinic: Return for labs and/or biopsy weekly the first month, every two weeks during month 2 and then monthly for the first year at the provider or coordinator's discretion. During the second year, return to clinic every 3 months. Post transplant year 3-5 return every 6 months. There will be a comprehensive post transplant evaluation every year that may include LHC/RHC/biopsy, stress test, echo, CXR, and other health screening exams.    In addition to the clinical assessment, I have ordered Allomap testing for this patient to assist in identification of moderate/severe acute cellular rejection (ACR) in a pt with stable Allograft function instead of endomyocardial biopsy.     Patient is reminded to call with any health changes as these can be early signs of transplant complications. Patient is advised to make sure any new medications or changes of old medications are discussed with a pharmacist or physician knowledgeable with transplant to avoid rejection/drug toxicity related to significant drug interactions.    UNOS Patient  Status  Functional Status: 80% - Normal activity with effort: some symptoms of disease  Physical Capacity: No Limitations  Working for Income: No  If no, reason not working: Demands of Treatment    Eugene Ritchie MD

## 2020-07-08 ENCOUNTER — TELEPHONE (OUTPATIENT)
Dept: TRANSPLANT | Facility: CLINIC | Age: 65
End: 2020-07-08

## 2020-07-08 DIAGNOSIS — Z94.1 STATUS POST HEART TRANSPLANT: ICD-10-CM

## 2020-07-08 DIAGNOSIS — R06.02 SHORTNESS OF BREATH: ICD-10-CM

## 2020-07-08 DIAGNOSIS — Z79.52 LONG TERM CURRENT USE OF SYSTEMIC STEROIDS: ICD-10-CM

## 2020-07-08 DIAGNOSIS — Z79.899 ENCOUNTER FOR LONG-TERM (CURRENT) USE OF MEDICATIONS: ICD-10-CM

## 2020-07-08 DIAGNOSIS — T86.20 COMPLICATION OF HEART TRANSPLANT, UNSPECIFIED COMPLICATION: ICD-10-CM

## 2020-07-08 NOTE — TELEPHONE ENCOUNTER
Left a message for pt to see if she had taken her medication prior to lab draw this morning.  Left a voice message for pt to call back and direct phone number left for her to call me back.Renal function unchanged, most likely pt took medication prior to lab draw.

## 2020-07-15 NOTE — TELEPHONE ENCOUNTER
Spoke with pt and discussed her elevated cya level, pt admitted that she had taken her medications prior to the lab draw, we reviewed when to take medication and lab draw.  Pt could restate instructions, we also reviewed that her birthday is [birth year] and that she is 27 years out from her transplant. She states she feels very blessed, she is having ringing in her ears, and has recently has been treated by her PCP with antibiotics and will f/u with them regarding still having trouble and possibly see an ENT md.

## 2020-07-21 ENCOUNTER — LAB VISIT (OUTPATIENT)
Dept: LAB | Facility: HOSPITAL | Age: 65
End: 2020-07-21
Attending: FAMILY MEDICINE
Payer: MEDICARE

## 2020-07-21 DIAGNOSIS — Z94.1 STATUS POST HEART TRANSPLANT: ICD-10-CM

## 2020-07-21 DIAGNOSIS — R06.02 SHORTNESS OF BREATH: ICD-10-CM

## 2020-07-21 DIAGNOSIS — Z79.899 ENCOUNTER FOR LONG-TERM (CURRENT) USE OF MEDICATIONS: ICD-10-CM

## 2020-07-21 DIAGNOSIS — Z79.60 LONG-TERM USE OF IMMUNOSUPPRESSANT MEDICATION: ICD-10-CM

## 2020-07-21 DIAGNOSIS — T86.20 COMPLICATION OF HEART TRANSPLANT, UNSPECIFIED COMPLICATION: ICD-10-CM

## 2020-07-21 DIAGNOSIS — E87.5 HYPERKALEMIA: ICD-10-CM

## 2020-07-21 DIAGNOSIS — Z79.52 LONG TERM CURRENT USE OF SYSTEMIC STEROIDS: ICD-10-CM

## 2020-07-21 LAB
ANION GAP SERPL CALC-SCNC: 8 MMOL/L (ref 8–16)
BNP SERPL-MCNC: 246 PG/ML (ref 0–99)
BUN SERPL-MCNC: 31 MG/DL (ref 8–23)
CALCIUM SERPL-MCNC: 9.1 MG/DL (ref 8.7–10.5)
CHLORIDE SERPL-SCNC: 103 MMOL/L (ref 95–110)
CO2 SERPL-SCNC: 28 MMOL/L (ref 23–29)
CREAT SERPL-MCNC: 2 MG/DL (ref 0.5–1.4)
EST. GFR  (AFRICAN AMERICAN): 29.3 ML/MIN/1.73 M^2
EST. GFR  (NON AFRICAN AMERICAN): 25.5 ML/MIN/1.73 M^2
GLUCOSE SERPL-MCNC: 88 MG/DL (ref 70–110)
POTASSIUM SERPL-SCNC: 5.2 MMOL/L (ref 3.5–5.1)
SODIUM SERPL-SCNC: 139 MMOL/L (ref 136–145)

## 2020-07-21 PROCEDURE — 80048 BASIC METABOLIC PNL TOTAL CA: CPT | Mod: HCNC

## 2020-07-21 PROCEDURE — 83880 ASSAY OF NATRIURETIC PEPTIDE: CPT | Mod: HCNC

## 2020-07-21 PROCEDURE — 80158 DRUG ASSAY CYCLOSPORINE: CPT | Mod: HCNC

## 2020-07-21 PROCEDURE — 36415 COLL VENOUS BLD VENIPUNCTURE: CPT | Mod: HCNC

## 2020-07-22 DIAGNOSIS — I10 ESSENTIAL HYPERTENSION: ICD-10-CM

## 2020-07-22 LAB — CYCLOSPORINE BLD LC/MS/MS-MCNC: 158 NG/ML (ref 100–400)

## 2020-07-22 NOTE — TELEPHONE ENCOUNTER
Called pt and left a voice message regarding CYA results with in goal, Potassium 5.2, and creat 2.0 asked her to call me at [phone] My direct line and to please hydrate.

## 2020-07-23 RX ORDER — HYDRALAZINE HYDROCHLORIDE 50 MG/1
50 TABLET, FILM COATED ORAL EVERY 8 HOURS
Qty: 90 TABLET | Refills: 11 | Status: SHIPPED | OUTPATIENT
Start: 2020-07-23 | End: 2020-07-29

## 2020-07-23 NOTE — TELEPHONE ENCOUNTER
Pt called 7/22/20 afternoon with blood pressure readings    Blood pressure readings from                    AM                         PM  Saturday; 146/91 HR 91        151/99    Sunday :   145/96                   148/93  Monday     157/101                 142/96  Tuesday    165/111                 150/93    Reviewed medication and plan to increase the Hydralazine from 25 mg TID to 50 mg TID   Pt is scheduled to repeat labs next week and see the nephrologist, she will call and review BLood pressure again next week.

## 2020-07-27 ENCOUNTER — LAB VISIT (OUTPATIENT)
Dept: LAB | Facility: HOSPITAL | Age: 65
End: 2020-07-27
Attending: INTERNAL MEDICINE
Payer: MEDICARE

## 2020-07-27 DIAGNOSIS — E83.52 HYPERCALCEMIA: ICD-10-CM

## 2020-07-27 LAB
ALBUMIN SERPL BCP-MCNC: 3 G/DL (ref 3.5–5.2)
ANION GAP SERPL CALC-SCNC: 9 MMOL/L (ref 8–16)
BUN SERPL-MCNC: 33 MG/DL (ref 8–23)
CALCIUM SERPL-MCNC: 8.1 MG/DL (ref 8.7–10.5)
CHLORIDE SERPL-SCNC: 104 MMOL/L (ref 95–110)
CO2 SERPL-SCNC: 26 MMOL/L (ref 23–29)
CREAT SERPL-MCNC: 1.9 MG/DL (ref 0.5–1.4)
EST. GFR  (AFRICAN AMERICAN): 31.2 ML/MIN/1.73 M^2
EST. GFR  (NON AFRICAN AMERICAN): 27.1 ML/MIN/1.73 M^2
GLUCOSE SERPL-MCNC: 86 MG/DL (ref 70–110)
PHOSPHATE SERPL-MCNC: 4 MG/DL (ref 2.7–4.5)
POTASSIUM SERPL-SCNC: 4.8 MMOL/L (ref 3.5–5.1)
SODIUM SERPL-SCNC: 139 MMOL/L (ref 136–145)

## 2020-07-27 PROCEDURE — 80069 RENAL FUNCTION PANEL: CPT | Mod: HCNC

## 2020-07-27 PROCEDURE — 36415 COLL VENOUS BLD VENIPUNCTURE: CPT | Mod: HCNC

## 2020-07-28 ENCOUNTER — PATIENT OUTREACH (OUTPATIENT)
Dept: ADMINISTRATIVE | Facility: OTHER | Age: 65
End: 2020-07-28

## 2020-07-29 ENCOUNTER — TELEPHONE (OUTPATIENT)
Dept: NEPHROLOGY | Facility: CLINIC | Age: 65
End: 2020-07-29

## 2020-07-29 DIAGNOSIS — I10 ESSENTIAL HYPERTENSION: ICD-10-CM

## 2020-07-29 NOTE — TELEPHONE ENCOUNTER
----- Message from Mierya Burk sent at 7/29/2020 11:44 AM CDT -----  Regarding: sooner appt  .Type:  Sooner Apoointment Request    Caller is requesting a sooner appointment.  Caller declined first available appointment listed below.  Caller will not accept being placed on the waitlist and is requesting a message be sent to doctor.  Name of Caller: pt  When is the first available appointment? 9/23  Symptoms: 6m/labs  Would the patient rather a call back or a response via [facility]?  Call back   Best Call Back Number: [phone] (home)   Additional Information: pt request appt on 8/3 due to transportation

## 2020-07-29 NOTE — TELEPHONE ENCOUNTER
----- Message from Polina Joshua sent at 7/29/2020  2:12 PM CDT -----  Pt is calling regarding transpiration for an appt. Would like to speak with the nurse. Please call back at [phone]

## 2020-07-31 RX ORDER — HYDRALAZINE HYDROCHLORIDE 50 MG/1
100 TABLET, FILM COATED ORAL EVERY 8 HOURS
Qty: 90 TABLET | Refills: 11 | Status: SHIPPED | OUTPATIENT
Start: 2020-07-31 | End: 2020-12-31

## 2020-08-04 ENCOUNTER — OFFICE VISIT (OUTPATIENT)
Dept: INTERNAL MEDICINE | Facility: CLINIC | Age: 65
End: 2020-08-04
Payer: MEDICARE

## 2020-08-04 VITALS
HEART RATE: 99 BPM | BODY MASS INDEX: 30.56 KG/M2 | RESPIRATION RATE: 18 BRPM | DIASTOLIC BLOOD PRESSURE: 88 MMHG | SYSTOLIC BLOOD PRESSURE: 154 MMHG | OXYGEN SATURATION: 98 % | WEIGHT: 167.13 LBS | TEMPERATURE: 97 F

## 2020-08-04 DIAGNOSIS — N18.30 CKD (CHRONIC KIDNEY DISEASE) STAGE 3, GFR 30-59 ML/MIN: ICD-10-CM

## 2020-08-04 DIAGNOSIS — I10 ESSENTIAL HYPERTENSION: Primary | ICD-10-CM

## 2020-08-04 DIAGNOSIS — R03.0 ELEVATED BLOOD PRESSURE READING: ICD-10-CM

## 2020-08-04 PROCEDURE — 1125F PR PAIN SEVERITY QUANTIFIED, PAIN PRESENT: ICD-10-PCS | Mod: HCNC,S$GLB,, | Performed by: FAMILY MEDICINE

## 2020-08-04 PROCEDURE — 3079F PR MOST RECENT DIASTOLIC BLOOD PRESSURE 80-89 MM HG: ICD-10-PCS | Mod: HCNC,CPTII,S$GLB, | Performed by: FAMILY MEDICINE

## 2020-08-04 PROCEDURE — 1125F AMNT PAIN NOTED PAIN PRSNT: CPT | Mod: HCNC,S$GLB,, | Performed by: FAMILY MEDICINE

## 2020-08-04 PROCEDURE — 99999 PR PBB SHADOW E&M-EST. PATIENT-LVL V: ICD-10-PCS | Mod: PBBFAC,HCNC,, | Performed by: FAMILY MEDICINE

## 2020-08-04 PROCEDURE — 3077F PR MOST RECENT SYSTOLIC BLOOD PRESSURE >= 140 MM HG: ICD-10-PCS | Mod: HCNC,CPTII,S$GLB, | Performed by: FAMILY MEDICINE

## 2020-08-04 PROCEDURE — 1159F MED LIST DOCD IN RCRD: CPT | Mod: HCNC,S$GLB,, | Performed by: FAMILY MEDICINE

## 2020-08-04 PROCEDURE — 99213 PR OFFICE/OUTPT VISIT, EST, LEVL III, 20-29 MIN: ICD-10-PCS | Mod: HCNC,S$GLB,, | Performed by: FAMILY MEDICINE

## 2020-08-04 PROCEDURE — 1159F PR MEDICATION LIST DOCUMENTED IN MEDICAL RECORD: ICD-10-PCS | Mod: HCNC,S$GLB,, | Performed by: FAMILY MEDICINE

## 2020-08-04 PROCEDURE — 3008F PR BODY MASS INDEX (BMI) DOCUMENTED: ICD-10-PCS | Mod: HCNC,CPTII,S$GLB, | Performed by: FAMILY MEDICINE

## 2020-08-04 PROCEDURE — 99213 OFFICE O/P EST LOW 20 MIN: CPT | Mod: HCNC,S$GLB,, | Performed by: FAMILY MEDICINE

## 2020-08-04 PROCEDURE — 3008F BODY MASS INDEX DOCD: CPT | Mod: HCNC,CPTII,S$GLB, | Performed by: FAMILY MEDICINE

## 2020-08-04 PROCEDURE — 99999 PR PBB SHADOW E&M-EST. PATIENT-LVL V: CPT | Mod: PBBFAC,HCNC,, | Performed by: FAMILY MEDICINE

## 2020-08-04 PROCEDURE — 3077F SYST BP >= 140 MM HG: CPT | Mod: HCNC,CPTII,S$GLB, | Performed by: FAMILY MEDICINE

## 2020-08-04 PROCEDURE — 1101F PT FALLS ASSESS-DOCD LE1/YR: CPT | Mod: HCNC,CPTII,S$GLB, | Performed by: FAMILY MEDICINE

## 2020-08-04 PROCEDURE — 3079F DIAST BP 80-89 MM HG: CPT | Mod: HCNC,CPTII,S$GLB, | Performed by: FAMILY MEDICINE

## 2020-08-04 PROCEDURE — 1101F PR PT FALLS ASSESS DOC 0-1 FALLS W/OUT INJ PAST YR: ICD-10-PCS | Mod: HCNC,CPTII,S$GLB, | Performed by: FAMILY MEDICINE

## 2020-08-04 RX ORDER — CLONIDINE HYDROCHLORIDE 0.1 MG/1
0.1 TABLET ORAL 2 TIMES DAILY
Qty: 60 TABLET | Refills: 2 | Status: SHIPPED | OUTPATIENT
Start: 2020-08-04 | End: 2020-08-10

## 2020-08-05 ENCOUNTER — OFFICE VISIT (OUTPATIENT)
Dept: NEPHROLOGY | Facility: CLINIC | Age: 65
End: 2020-08-05
Payer: MEDICARE

## 2020-08-05 VITALS
RESPIRATION RATE: 20 BRPM | HEIGHT: 62 IN | BODY MASS INDEX: 31.03 KG/M2 | DIASTOLIC BLOOD PRESSURE: 104 MMHG | SYSTOLIC BLOOD PRESSURE: 180 MMHG | HEART RATE: 80 BPM | WEIGHT: 168.63 LBS

## 2020-08-05 DIAGNOSIS — I10 ESSENTIAL HYPERTENSION: Primary | ICD-10-CM

## 2020-08-05 PROCEDURE — 99215 PR OFFICE/OUTPT VISIT, EST, LEVL V, 40-54 MIN: ICD-10-PCS | Mod: HCNC,S$GLB,, | Performed by: INTERNAL MEDICINE

## 2020-08-05 PROCEDURE — 3077F PR MOST RECENT SYSTOLIC BLOOD PRESSURE >= 140 MM HG: ICD-10-PCS | Mod: HCNC,CPTII,S$GLB, | Performed by: INTERNAL MEDICINE

## 2020-08-05 PROCEDURE — 1101F PR PT FALLS ASSESS DOC 0-1 FALLS W/OUT INJ PAST YR: ICD-10-PCS | Mod: HCNC,CPTII,S$GLB, | Performed by: INTERNAL MEDICINE

## 2020-08-05 PROCEDURE — 1159F MED LIST DOCD IN RCRD: CPT | Mod: HCNC,S$GLB,, | Performed by: INTERNAL MEDICINE

## 2020-08-05 PROCEDURE — 99499 UNLISTED E&M SERVICE: CPT | Mod: HCNC,S$GLB,, | Performed by: INTERNAL MEDICINE

## 2020-08-05 PROCEDURE — 99215 OFFICE O/P EST HI 40 MIN: CPT | Mod: HCNC,S$GLB,, | Performed by: INTERNAL MEDICINE

## 2020-08-05 PROCEDURE — 3080F DIAST BP >= 90 MM HG: CPT | Mod: HCNC,CPTII,S$GLB, | Performed by: INTERNAL MEDICINE

## 2020-08-05 PROCEDURE — 1126F PR PAIN SEVERITY QUANTIFIED, NO PAIN PRESENT: ICD-10-PCS | Mod: HCNC,S$GLB,, | Performed by: INTERNAL MEDICINE

## 2020-08-05 PROCEDURE — 3080F PR MOST RECENT DIASTOLIC BLOOD PRESSURE >= 90 MM HG: ICD-10-PCS | Mod: HCNC,CPTII,S$GLB, | Performed by: INTERNAL MEDICINE

## 2020-08-05 PROCEDURE — 99999 PR PBB SHADOW E&M-EST. PATIENT-LVL V: ICD-10-PCS | Mod: PBBFAC,HCNC,, | Performed by: INTERNAL MEDICINE

## 2020-08-05 PROCEDURE — 1159F PR MEDICATION LIST DOCUMENTED IN MEDICAL RECORD: ICD-10-PCS | Mod: HCNC,S$GLB,, | Performed by: INTERNAL MEDICINE

## 2020-08-05 PROCEDURE — 3077F SYST BP >= 140 MM HG: CPT | Mod: HCNC,CPTII,S$GLB, | Performed by: INTERNAL MEDICINE

## 2020-08-05 PROCEDURE — 1101F PT FALLS ASSESS-DOCD LE1/YR: CPT | Mod: HCNC,CPTII,S$GLB, | Performed by: INTERNAL MEDICINE

## 2020-08-05 PROCEDURE — 1126F AMNT PAIN NOTED NONE PRSNT: CPT | Mod: HCNC,S$GLB,, | Performed by: INTERNAL MEDICINE

## 2020-08-05 PROCEDURE — 99499 RISK ADDL DX/OHS AUDIT: ICD-10-PCS | Mod: HCNC,S$GLB,, | Performed by: INTERNAL MEDICINE

## 2020-08-05 PROCEDURE — 3008F PR BODY MASS INDEX (BMI) DOCUMENTED: ICD-10-PCS | Mod: HCNC,CPTII,S$GLB, | Performed by: INTERNAL MEDICINE

## 2020-08-05 PROCEDURE — 99999 PR PBB SHADOW E&M-EST. PATIENT-LVL V: CPT | Mod: PBBFAC,HCNC,, | Performed by: INTERNAL MEDICINE

## 2020-08-05 PROCEDURE — 3008F BODY MASS INDEX DOCD: CPT | Mod: HCNC,CPTII,S$GLB, | Performed by: INTERNAL MEDICINE

## 2020-08-05 RX ORDER — FUROSEMIDE 20 MG/1
20 TABLET ORAL 2 TIMES DAILY
Qty: 60 TABLET | Refills: 11 | Status: SHIPPED | OUTPATIENT
Start: 2020-08-05 | End: 2020-08-12 | Stop reason: SDUPTHER

## 2020-08-05 RX ORDER — LOSARTAN POTASSIUM 25 MG/1
25 TABLET ORAL DAILY
Qty: 90 TABLET | Refills: 3 | Status: SHIPPED | OUTPATIENT
Start: 2020-08-05 | End: 2020-09-01 | Stop reason: SDUPTHER

## 2020-08-05 RX ORDER — FUROSEMIDE 20 MG/1
20 TABLET ORAL DAILY
Qty: 30 TABLET | Refills: 11 | Status: SHIPPED | OUTPATIENT
Start: 2020-08-05 | End: 2020-08-05

## 2020-08-05 NOTE — PROGRESS NOTES
NEPHROLOGY CLINIC FOLLOWUP NOTE  Date of clinic visit: 8/5/20     REASON FOR FOLLOWUP AND CHIEF COMPLAINT: nephrolithiasis, hypercalcemia, and history of chronic kidney disease post-heart transplant.     HISTORY OF PRESENT ILLNESS: Ms. Damon is a 65-year-old female with a history of CKD stage 3, nephrolithiasis (likely mixed stones), and heart transplant in 1993 (is on cyclosporine), who presents for f/u. Pt was last seen by us about 8 months ago. Chart was reviewed that BP meds have been changed since last visit. BP appears to have been low, and amlodipine was stopped. More recently, BP was elevated, hydralazine was added, then dose was increased. Clonidine was most recently added. Meds and doses were reviewed with pt. BP remains elevated.    To review: Pt has had mild, persistent hypercalcemia and increase in iPTH. Primary hyperparathyroidism (HPT) is suspected. Sestamibi nuclear scan of the neck did not show any adenomas. She is on a very low dose of PO lasix (10) mg to control s Ca.     On f/u today, pt has no acute or new c/o's. she is worried about her BP. She denies eating salty foods. She is compliant with all the meds. Pt reports no new kidney stones. No discomfort today. HCTZ was previosuly stopped already due to low BP and risk of hypercalcemia. Her risk factors for kidney stones include primary hyperparathyroidism (HPT) and being on cyclosporine for prevention of heart transplant rejection, which causes hyperuricemia. Pt has a h/o of osteopenia.     In addition, she has chronically mild low serum albumin. She has no volume overload/CHF, or liver disease. A review of the records showed that s alb has been low at least for 9 years. Noted that she also has mild proteinuria. She has no leg swelling or SOB.        To review:   Pt previously developed right sided back pain, thought to be related to kidney stones identified on renal u/s. The stones in the R kidney were non-obstructive but were relatively  large at 10 and 12 mm each. Based on h/o of taking cyclosporine to prevent heart transplant rejection and eating a lot of sea food (shrimp and crawfish), uric acid stones were suspected. Pt had additional risk factors for non-uric acid stones, including taking Vit C 1 g/day and Vit D. After dietary modification, the flank pain resolved. A f/u CT scan did not show any stones. It is possible that she passed the 2 stones, as uric acid stones are soluble on urinary alkalinization and can pass by themselves. Pt was advised to stop Vit C and Vit D, which she has. She was also advised on drinking freshly squeezed lemon juice and follow dietary recommendations to avoid nephrolithiasis risk factors. Pt was referred also to urology. A repeat CT scan did not show any stones. Pt obtained a 24 hour urine collection for kidney stone risk stratification which showed mild hypocitraturia.         To review:  Pt has CKD       PAST MEDICAL HISTORY:  1. CKD stage III. 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 Hypertension.  3. Heart transplant for cardiomyopathy in 1993, the patient has been on chronic  cyclosporine treatment.  4. Carpal tunnel syndrome.  5. Fibromyalgia.  6. GERD.  7. Bell's palsy.  8. Depression.     PAST SURGICAL HISTORY: Reviewed as above, unchanged.     MEDICATIONS: Reviewed    Current Outpatient Medications:     acetaminophen 500 mg/15 mL Liqd, , Disp: , Rfl:     aspirin (ECOTRIN) 81 MG EC tablet, Take 1 tablet (81 mg total) by mouth once daily., Disp: 30 tablet, Rfl: 11    baclofen (LIORESAL) 10 MG tablet, TAKE 1 TABLET THREE TIMES DAILY AS NEEDED FOR MUSCLE SPASMS, Disp: 270 tablet, Rfl: 0    biotin 10,000 mcg Cap, Take 1 tablet by mouth once daily., Disp: , Rfl:     busPIRone (BUSPAR) 10 MG tablet, TAKE 1 TABLET EVERY DAY, Disp: 90 tablet, Rfl: 1    clobetasol (TEMOVATE) 0.05 % external solution, Apply topically 2 (two) times daily.  Apply to the scalp., Disp: 50 mL, Rfl: 2    cloNIDine (CATAPRES) 0.1 MG tablet, Take 1 tablet (0.1 mg total) by mouth 2 (two) times daily., Disp: 60 tablet, Rfl: 2    cycloSPORINE modified, NEORAL, (NEORAL) 100 MG capsule, TAKE 1 CAPSULE EVERY DAY, Disp: 90 capsule, Rfl: 0    cycloSPORINE modified, NEORAL, (NEORAL) 25 MG capsule, TAKE 3 CAPSULES EVERY DAY, Disp: 270 capsule, Rfl: 0    DULoxetine (CYMBALTA) 30 MG capsule, TAKE 1 CAPSULE EVERY DAY, Disp: 90 capsule, Rfl: 1    estradiol (ESTRACE) 0.01 % (0.1 mg/gram) vaginal cream, Place 1 g vaginally twice a week. (Patient not taking: Reported on 8/4/2020), Disp: 1 Tube, Rfl: 12    EVENING PRIMROSE OIL ORAL, Take 1,000 mg by mouth once daily., Disp: , Rfl:     ferrous sulfate (FEOSOL) 325 mg (65 mg iron) Tab tablet, Take 1 tablet (325 mg total) by mouth once daily., Disp: 100 tablet, Rfl: 3    fluticasone (FLONASE) 50 mcg/actuation nasal spray, 2 sprays by Each Nare route once daily., Disp: 1 Bottle, Rfl: 0    furosemide (LASIX) 20 MG tablet, Take 1 tablet (20 mg total) by mouth once daily. Take 1/2 (10 mg) po qd, Disp: 30 tablet, Rfl: 11    hydrALAZINE (APRESOLINE) 50 MG tablet, Take 2 tablets (100 mg total) by mouth every 8 (eight) hours., Disp: 90 tablet, Rfl: 11    leg brace (ANKLE SUPPORT MISC), , Disp: , Rfl:     metoprolol succinate (TOPROL-XL) 25 MG 24 hr tablet, TAKE 1 TABLET EVERY DAY (Patient taking differently: Take 50 mg by mouth. 25mg 2 tabs daily), Disp: 90 tablet, Rfl: 3    mometasone (ELOCON) 0.1 % lotion, Apply topically once daily., Disp: 180 mL, Rfl: 3    multivitamin capsule, Take 1 capsule by mouth once daily., Disp: , Rfl:     omeprazole (PRILOSEC) 10 MG capsule, TAKE 1 CAPSULE EVERY DAY, Disp: 90 capsule, Rfl: 0    pregabalin (LYRICA) 50 MG capsule, Take 1 capsule (50 mg total) by mouth 2 (two) times daily., Disp: 60 capsule, Rfl: 4    temazepam (RESTORIL) 30 mg capsule, TAKE 1 CAPSULE BY MOUTH EVERYDAY AT BEDTIME, Disp: 30  capsule, Rfl: 2    vitamin E 400 UNIT capsule, Take 400 Units by mouth once daily., Disp: , Rfl:     zolpidem (AMBIEN) 10 mg Tab, TAKE 1 TABLET BY MOUTH ONCE DAILY IN THE EVENING, Disp: 90 tablet, Rfl: 1    Current Facility-Administered Medications:     denosumab (PROLIA) injection 60 mg, 60 mg, Subcutaneous, Q6 Months, Prasanth Johnson MD    triamcinolone acetonide injection 10 mg, 10 mg, Intradermal, 1 time in Clinic/HOD, Jose Roland MD     SOCIAL HISTORY: Reviewed. Negative for smoking. No alcohol use.      REVIEW OF SYSTEMS: No recent hospitalizations.  GENERAL: Negative.  HEAD, EYES, EARS, NOSE, AND THROAT: Negative.  CARDIAC: Negative.  PULMONARY: Negative.  GASTROINTESTINAL: Negative.  GENITOURINARY: Negative.  PSYCHOLOGICAL: Negative.  NEUROLOGICAL: Negative.  ENDOCRINE: Negative.  HEMATOLOGIC AND ONCOLOGIC: Negative.  INFECTIOUS DISEASE: Negative.  The rest of the review of systems negative.     PHYSICAL EXAMINATION:  VITAL SIGNS: Repeat blood pressure is 180/104, repeat after 5 min of rest was 155/94. Pulse is 80, weight is 76.5 Kg, last visit 75.5 Kg, from 72.8 Kg  GENERAL: She is cooperative, pleasant, in no acute distress.   Speech and thought process appropriate, normal.  HEENT: Mucous membranes moist.  NECK: Neck JVD. Normal hearing.  ABDOMEN: Soft, nontender.  EXTREMITIES: trace pitting edema.     LABS: Reviewed.   BMP  Lab Results   Component Value Date     07/27/2020    K 4.8 07/27/2020     07/27/2020    CO2 26 07/27/2020    BUN 33 (H) 07/27/2020    CREATININE 1.9 (H) 07/27/2020    CALCIUM 8.1 (L) 07/27/2020    ANIONGAP 9 07/27/2020    ESTGFRAFRICA 31.2 (A) 07/27/2020    EGFRNONAA 27.1 (A) 07/27/2020     Lab Results   Component Value Date    WBC 3.24 (L) 07/07/2020    HGB 10.6 (L) 07/07/2020    HCT 34.2 (L) 07/07/2020    MCV 92 07/07/2020     07/07/2020      urine protein/cr 6 g  U/a: 3+ protein, no blood, 4 RBC's, no casts    To review older labs:   24 hour urine: Ca not  measured, uric acid 138, Oxalate 20, citrate 203  U/a unremarkable  Urine Cx: neg   Urine P/Cr 740 mg     KUB: unremarkable, except for some constipation     Renal u/s: FINDINGS:  Right kidney: The right kidney measures 9.6 cm. Mild cortical thinning and increased cortical echogenicity.  10 and 12 mm stones are seen in the upper and lower poles respectively.  No cortical thinning. No loss of corticomedullary distinction. No mass. No renal stone. No hydronephrosis.  Left kidney: The left kidney measures 9.3 cm. Mild cortical thinning and increased cortical echogenicity. No loss of corticomedullary distinction. No mass. No renal stone. No hydronephrosis.  The bladder is partially distended at the time of scanning and has an unremarkable appearance.     CT abd: The left kidney appears slightly small.  Right kidney is grossly normal in size.  No obvious hydronephrosis or nephrolithiasis     Sestamibi nuclear scan of the neck: 8/29/19: no adenomas              ASSESSMENT AND PLAN: This is a 65-year-old female who presents for follow up of hypercalcemia and suspected primary HPT. Pt is noted to be worse with uncontrolled BP and nephrotic syndrome:  Patient has a h/o of heart transplant  The impression is as follows:     1. Renal: s Cr has always fluctuated inn this pt. Difficult to assess changes and relate them to GRACE  However, pt clearly has worsened proteinuria, now nephrotic syndrome  Why does pt have nephrotic syndrome?  Reason not clear: DDX: related to cyclosporine: calcineurin inhibitor nephrotoxicity  Pt will need higher doses of lasix: will increase dose from 10 mg qd to 20 mg bid  Will likely benefit from adding an ARB. Is allergic to lisinopril. Will start losartan 25 mg po bid  Pt will need close f/u  Will possibly benefit diagnostically from a kidney biopsy     2. Nephrolithiasis: no new stones.  Has multiple risk factors for kidney stones: (hyperuricemia from cyclosporine, diet rich in uric acid, previously  taking Vit C, vit D, and calcium,, CKD, and having primary hyperparathyroidism)  Stones are likely mixed ca oxalate and uric acid kidney stones  No longer taking Vit C  No longer takes Vit D and Ca.   Mild hyperuricemia, from cyclosporine.  Mild persistent hypercalcemia (corrected ca for low albumin is slightly elevated)  F/u CT did not show any stones  U/s showed non-obstructive 2 R stones, relatively large  May have passed the stones  24 hour urine for kidney stone stratification shows hypocitraturia  Pt was advised NOT TO STOP cyclosporine.     3. HTN: BP not controlled. Accelerated HTN. Due to nephrotic syndrome  Reviewed meds with pt  Noted changes  Mgmt as above in #1     4. History of heart transplant on cyclosporine  Per heart transplant team.      5. Elevated iPTH, likely has primary hyperparathyroidism (HPT)  No adenomas found on sestamibi nulcear scan  May have diffuse hyperplasia  Primary HPT can explain increased risk of kidney stones     6. Leukopenia noted: mild.  Likely due to CSA.  Advised pt to follow up with heart transplant clinic at [facility]        PLANS AND RECOMMENDATIONS:   As discussed above  Opportunity for questions provided  Complicated case, multiple issues addressed  RTC 2-3 weeks  Total time spent 40 minutes. Extensive time spent including the time to review the records, the patient evaluation, documentation, face-to-face  discussion with the patient. More than 50% of the time was spent in counseling  and coordination of care. Level V visit.     Carter Crawford MD

## 2020-08-05 NOTE — PROGRESS NOTES
Subjective:       Patient ID: Nadia Damon is a 65 y.o. female.    Chief Complaint: Hypertension    Follow-up hypertension elevated blood pressure.  Her home blood pressures have been 145/89, 148/95, 145/89, 161/103.  She reports some headache when pressure is elevated.  She denies chest pain palpitations shortness of breath or edema.  Medical history includes heart transplant chronic kidney disease.  She currently is on Apresoline 100 mg 3 times a day metoprolol 25 mg 2 twice a day and Lasix 10 mg a day.    Hypertension  Associated symptoms include headaches. Pertinent negatives include no chest pain, palpitations or shortness of breath.     Review of Systems   Constitutional: Negative for chills, diaphoresis and fever.   Respiratory: Negative for cough, shortness of breath and wheezing.    Cardiovascular: Negative for chest pain and palpitations.   Neurological: Positive for headaches. Negative for dizziness, weakness and light-headedness.   Psychiatric/Behavioral: The patient is not nervous/anxious.        Objective:      Physical Exam  Constitutional:       General: She is not in acute distress.     Appearance: Normal appearance. She is not ill-appearing.   Cardiovascular:      Rate and Rhythm: Normal rate and regular rhythm.      Heart sounds: No murmur.   Pulmonary:      Effort: Pulmonary effort is normal. No respiratory distress.      Breath sounds: Normal breath sounds. No wheezing or rales.   Skin:     General: Skin is warm and dry.   Neurological:      Mental Status: She is alert.   Psychiatric:         Mood and Affect: Mood normal.         Behavior: Behavior normal.         Thought Content: Thought content normal.         Judgment: Judgment normal.         Lab Visit on 07/27/2020   Component Date Value Ref Range Status    Glucose 07/27/2020 86  70 - 110 mg/dL Final    Sodium 07/27/2020 139  136 - 145 mmol/L Final    Potassium 07/27/2020 4.8  3.5 - 5.1 mmol/L Final    Chloride 07/27/2020 104  95 -  110 mmol/L Final    CO2 07/27/2020 26  23 - 29 mmol/L Final    BUN, Bld 07/27/2020 33* 8 - 23 mg/dL Final    Calcium 07/27/2020 8.1* 8.7 - 10.5 mg/dL Final    Creatinine 07/27/2020 1.9* 0.5 - 1.4 mg/dL Final    Albumin 07/27/2020 3.0* 3.5 - 5.2 g/dL Final    Phosphorus 07/27/2020 4.0  2.7 - 4.5 mg/dL Final    eGFR if  07/27/2020 31.2* >60 mL/min/1.73 m^2 Final    eGFR if non African American 07/27/2020 27.1* >60 mL/min/1.73 m^2 Final    Anion Gap 07/27/2020 9  8 - 16 mmol/L Final     Assessment:       1. Essential hypertension        Plan:     Medications reviewed.  Recommend clonidine 0.1 mg twice a day.  Follow-up in 1 week.    Essential hypertension  -     cloNIDine (CATAPRES) 0.1 MG tablet; Take 1 tablet (0.1 mg total) by mouth 2 (two) times daily.  Dispense: 60 tablet; Refill: 2

## 2020-08-07 ENCOUNTER — TELEPHONE (OUTPATIENT)
Dept: TRANSPLANT | Facility: CLINIC | Age: 65
End: 2020-08-07

## 2020-08-07 NOTE — TELEPHONE ENCOUNTER
Blood pressure improved, pt called stated   bp 123/77 Heart rate 84  /87 hr 77    Stated that since she is taking the sinus medication she saw that her BP has improved.

## 2020-08-07 NOTE — TELEPHONE ENCOUNTER
----- Message from Carolina Elder sent at 8/5/2020  5:21 PM CDT -----  Regarding: can we decrease cya

## 2020-08-11 ENCOUNTER — TELEPHONE (OUTPATIENT)
Dept: TRANSPLANT | Facility: CLINIC | Age: 65
End: 2020-08-11

## 2020-08-11 ENCOUNTER — OFFICE VISIT (OUTPATIENT)
Dept: INTERNAL MEDICINE | Facility: CLINIC | Age: 65
End: 2020-08-11
Payer: MEDICARE

## 2020-08-11 ENCOUNTER — IMMUNIZATION (OUTPATIENT)
Dept: PHARMACY | Facility: CLINIC | Age: 65
End: 2020-08-11
Payer: MEDICARE

## 2020-08-11 VITALS
RESPIRATION RATE: 16 BRPM | OXYGEN SATURATION: 98 % | BODY MASS INDEX: 30.67 KG/M2 | HEIGHT: 62 IN | TEMPERATURE: 97 F | SYSTOLIC BLOOD PRESSURE: 122 MMHG | DIASTOLIC BLOOD PRESSURE: 68 MMHG | HEART RATE: 93 BPM | WEIGHT: 166.69 LBS

## 2020-08-11 DIAGNOSIS — N18.30 CKD (CHRONIC KIDNEY DISEASE) STAGE 3, GFR 30-59 ML/MIN: ICD-10-CM

## 2020-08-11 DIAGNOSIS — Z94.1 HEART TRANSPLANTED: Chronic | ICD-10-CM

## 2020-08-11 DIAGNOSIS — Z28.39 IMMUNIZATION DEFICIENCY: ICD-10-CM

## 2020-08-11 DIAGNOSIS — D84.9 IMMUNOCOMPROMISED PATIENT: ICD-10-CM

## 2020-08-11 DIAGNOSIS — I10 ESSENTIAL HYPERTENSION: Primary | ICD-10-CM

## 2020-08-11 PROCEDURE — 99213 OFFICE O/P EST LOW 20 MIN: CPT | Mod: HCNC,S$GLB,, | Performed by: FAMILY MEDICINE

## 2020-08-11 PROCEDURE — 3078F DIAST BP <80 MM HG: CPT | Mod: HCNC,CPTII,S$GLB, | Performed by: FAMILY MEDICINE

## 2020-08-11 PROCEDURE — 99999 PR PBB SHADOW E&M-EST. PATIENT-LVL IV: CPT | Mod: PBBFAC,HCNC,, | Performed by: FAMILY MEDICINE

## 2020-08-11 PROCEDURE — 3078F PR MOST RECENT DIASTOLIC BLOOD PRESSURE < 80 MM HG: ICD-10-PCS | Mod: HCNC,CPTII,S$GLB, | Performed by: FAMILY MEDICINE

## 2020-08-11 PROCEDURE — 1101F PR PT FALLS ASSESS DOC 0-1 FALLS W/OUT INJ PAST YR: ICD-10-PCS | Mod: HCNC,CPTII,S$GLB, | Performed by: FAMILY MEDICINE

## 2020-08-11 PROCEDURE — 1159F PR MEDICATION LIST DOCUMENTED IN MEDICAL RECORD: ICD-10-PCS | Mod: HCNC,S$GLB,, | Performed by: FAMILY MEDICINE

## 2020-08-11 PROCEDURE — 99213 PR OFFICE/OUTPT VISIT, EST, LEVL III, 20-29 MIN: ICD-10-PCS | Mod: HCNC,S$GLB,, | Performed by: FAMILY MEDICINE

## 2020-08-11 PROCEDURE — 1159F MED LIST DOCD IN RCRD: CPT | Mod: HCNC,S$GLB,, | Performed by: FAMILY MEDICINE

## 2020-08-11 PROCEDURE — 3074F SYST BP LT 130 MM HG: CPT | Mod: HCNC,CPTII,S$GLB, | Performed by: FAMILY MEDICINE

## 2020-08-11 PROCEDURE — 1101F PT FALLS ASSESS-DOCD LE1/YR: CPT | Mod: HCNC,CPTII,S$GLB, | Performed by: FAMILY MEDICINE

## 2020-08-11 PROCEDURE — 3008F PR BODY MASS INDEX (BMI) DOCUMENTED: ICD-10-PCS | Mod: HCNC,CPTII,S$GLB, | Performed by: FAMILY MEDICINE

## 2020-08-11 PROCEDURE — 99999 PR PBB SHADOW E&M-EST. PATIENT-LVL IV: ICD-10-PCS | Mod: PBBFAC,HCNC,, | Performed by: FAMILY MEDICINE

## 2020-08-11 PROCEDURE — 3074F PR MOST RECENT SYSTOLIC BLOOD PRESSURE < 130 MM HG: ICD-10-PCS | Mod: HCNC,CPTII,S$GLB, | Performed by: FAMILY MEDICINE

## 2020-08-11 PROCEDURE — 3008F BODY MASS INDEX DOCD: CPT | Mod: HCNC,CPTII,S$GLB, | Performed by: FAMILY MEDICINE

## 2020-08-11 RX ORDER — CLONIDINE HYDROCHLORIDE 0.1 MG/1
0.1 TABLET ORAL 2 TIMES DAILY
Qty: 180 TABLET | Refills: 3 | Status: SHIPPED | OUTPATIENT
Start: 2020-08-11 | End: 2020-09-01

## 2020-08-11 NOTE — TELEPHONE ENCOUNTER
Pt called and stated that she received the first dose of the shingles vaccine and she needs to repeat the second dose.  Also stated that she went to the Nephrologist today and her blood pressure is wonderful. She received a good report.

## 2020-08-11 NOTE — PROGRESS NOTES
Subjective:       Patient ID: Nadia Damon is a 65 y.o. female.    Chief Complaint: Hypertension    Follow-up hypertension elevated blood pressure with headache.  Clonidine 0.1 mg twice a day was started last visit.  She has some dry mouth but otherwise feels well normal medical history also includes chronic kidney disease stage 4 heart transplantation chronic anemia.    Review of Systems   Constitutional: Negative for chills and fever.   Respiratory: Negative for cough, chest tightness, shortness of breath and wheezing.    Cardiovascular: Negative for chest pain and palpitations.   Gastrointestinal: Negative for nausea.   Neurological: Negative for dizziness, light-headedness and headaches.       Objective:      Physical Exam  Constitutional:       General: She is not in acute distress.     Appearance: Normal appearance. She is not diaphoretic.   Cardiovascular:      Rate and Rhythm: Normal rate and regular rhythm.   Pulmonary:      Effort: Pulmonary effort is normal. No respiratory distress.      Breath sounds: Normal breath sounds. No wheezing or rales.   Skin:     General: Skin is warm and dry.   Neurological:      Mental Status: She is alert.   Psychiatric:         Mood and Affect: Mood normal.         Thought Content: Thought content normal.         Judgment: Judgment normal.         Lab Visit on 07/27/2020   Component Date Value Ref Range Status    Glucose 07/27/2020 86  70 - 110 mg/dL Final    Sodium 07/27/2020 139  136 - 145 mmol/L Final    Potassium 07/27/2020 4.8  3.5 - 5.1 mmol/L Final    Chloride 07/27/2020 104  95 - 110 mmol/L Final    CO2 07/27/2020 26  23 - 29 mmol/L Final    BUN, Bld 07/27/2020 33* 8 - 23 mg/dL Final    Calcium 07/27/2020 8.1* 8.7 - 10.5 mg/dL Final    Creatinine 07/27/2020 1.9* 0.5 - 1.4 mg/dL Final    Albumin 07/27/2020 3.0* 3.5 - 5.2 g/dL Final    Phosphorus 07/27/2020 4.0  2.7 - 4.5 mg/dL Final    eGFR if  07/27/2020 31.2* >60 mL/min/1.73 m^2 Final     eGFR if non African American 07/27/2020 27.1* >60 mL/min/1.73 m^2 Final    Anion Gap 07/27/2020 9  8 - 16 mmol/L Final     Assessment:       1. Essential hypertension        Plan:   Blood pressure 122/68.  Health maintenance reviewed.  Shingrix ordered today.  She has a history of zoster in the past.  Follow-up in 3 months.    Essential hypertension  -     cloNIDine (CATAPRES) 0.1 MG tablet; Take 1 tablet (0.1 mg total) by mouth 2 (two) times daily.  Dispense: 180 tablet; Refill: 3

## 2020-08-12 ENCOUNTER — TELEPHONE (OUTPATIENT)
Dept: INTERNAL MEDICINE | Facility: CLINIC | Age: 65
End: 2020-08-12

## 2020-08-12 RX ORDER — FUROSEMIDE 20 MG/1
20 TABLET ORAL 2 TIMES DAILY
Qty: 180 TABLET | Refills: 3 | Status: SHIPPED | OUTPATIENT
Start: 2020-08-12 | End: 2020-09-01

## 2020-08-12 NOTE — TELEPHONE ENCOUNTER
----- Message from Arlette Nicolas sent at 8/11/2020  4:56 PM CDT -----  Regarding: refill request  Contact: Pt  States that her pharm has been faxing over request for med refills and has not gotten a response. Pt did not know name of medications. Pt uses     [facility] - [place], [place] - [address]  [address] [place] [address]  Phone: [phone] Fax: [phone]    Please call back at [phone]//thank you acc

## 2020-08-12 NOTE — TELEPHONE ENCOUNTER
----- Message from Polina Joshua sent at 8/12/2020  9:10 AM CDT -----  Type:  RX Refill Request    Who Called: Nadia  Refill or New Rx:refill  RX Name and Strength:furosemide (LASIX) 20 MG tablet  How is the patient currently taking it? (ex. 1XDay):  Is this a 30 day or 90 day RX:  Preferred Pharmacy with phone number:  [facility] - [facility] [address]  [address] [facility] [address]  Phone: [phone] Fax: [phone]        Local or Mail Order:mail  Ordering Provider:Yvette  Would the patient rather a call back or a response via [facility]? call  Best Call Back Number:[phone]    Additional Information:

## 2020-08-19 RX ORDER — FUROSEMIDE 20 MG/1
20 TABLET ORAL 2 TIMES DAILY
Qty: 180 TABLET | Refills: 3 | Status: CANCELLED | OUTPATIENT
Start: 2020-08-19

## 2020-08-21 DIAGNOSIS — F51.01 PRIMARY INSOMNIA: ICD-10-CM

## 2020-08-21 NOTE — TELEPHONE ENCOUNTER
----- Message from Bryce Logan sent at 8/21/2020  2:08 PM CDT -----  Contact: self  Type:  RX Refill Request    Who Called: Nadia Damon   Refill or New Rx:refill  RX Name and Strength:zolpidem (AMBIEN) 10 mg Tab  How is the patient currently taking it? (ex. 1XDay):1XDay  Is this a 30 day or 90 day RX:90  Preferred Pharmacy with phone number:  [facility]/pharmacy #[id number] - [place] [address] [address] [address] [address]  Phone: [phone] Fax: [phone]  Local or Mail Order:local  Ordering Provider:Ange  Would the patient rather a call back or a response via [facility]? Call back  Best Call Back Number:[phone]  Additional Information:

## 2020-08-21 NOTE — TELEPHONE ENCOUNTER
----- Message from Marily Martinez sent at 8/21/2020  9:23 AM CDT -----  Contact: [phone]  Type:  RX Refill Request    Who Called: patient  Refill or New Rx:refill  RX Name and Strength:10mg  How is the patient currently taking it? (ex. 1XDay):once  Is this a 30 day or 90 day RX:90  Preferred Pharmacy with phone number: [facility]   [phone]  Local or Mail Order local   Ordering Provider:allegra  Would the patient rather a call back or a response via [facility]? Call back   Best Call Back Number:[phone]  Additional Information:

## 2020-08-22 RX ORDER — ZOLPIDEM TARTRATE 10 MG/1
TABLET ORAL
Qty: 90 TABLET | Refills: 1 | Status: SHIPPED | OUTPATIENT
Start: 2020-08-22 | End: 2020-08-24 | Stop reason: SDUPTHER

## 2020-08-24 ENCOUNTER — TELEPHONE (OUTPATIENT)
Dept: INTERNAL MEDICINE | Facility: CLINIC | Age: 65
End: 2020-08-24

## 2020-08-24 DIAGNOSIS — F51.01 PRIMARY INSOMNIA: ICD-10-CM

## 2020-08-24 RX ORDER — ZOLPIDEM TARTRATE 10 MG/1
TABLET ORAL
Qty: 90 TABLET | Refills: 1 | Status: CANCELLED | OUTPATIENT
Start: 2020-08-24

## 2020-08-24 RX ORDER — ZOLPIDEM TARTRATE 10 MG/1
TABLET ORAL
Qty: 90 TABLET | Refills: 1 | Status: SHIPPED | OUTPATIENT
Start: 2020-08-24 | End: 2020-10-08 | Stop reason: SDUPTHER

## 2020-08-24 NOTE — TELEPHONE ENCOUNTER
----- Message from Priscilla Camp sent at 8/24/2020  7:21 AM CDT -----  Type:  RX Refill Request    Who Called: TICO LIANG   Refill or New Rx:refill   RX Name and Strength: AMBIEN 10 mg   How is the patient currently taking it? (ex. 1XDay): 1 daily   Is this a 30 day or 90 day RX:  90 day   Preferred Pharmacy with phone number:     [facility]/pharmacy #[id number] - [place] - [address] [address] [address]  Phone: [phone] Fax: [phone]     Local or Mail Order: local   Ordering Provider: irving   Would the patient rather a call back or a response via [facility]? Call   Best Call Back Number: [phone] (home)    Additional Information:    Pt is leaving town @ 10 am. Today do to the storm please

## 2020-08-28 ENCOUNTER — PATIENT OUTREACH (OUTPATIENT)
Dept: ADMINISTRATIVE | Facility: OTHER | Age: 65
End: 2020-08-28

## 2020-08-28 NOTE — PROGRESS NOTES
Health Maintenance Due   Topic Date Due    TETANUS VACCINE  07/16/1973    Lipid Panel  02/13/2020    Mammogram  10/03/2020     Updates were requested from care everywhere.  Chart was reviewed for overdue [facility] Encounters (ANDRE) topics (CRS, Breast Cancer Screening, Eye exam)  Health Maintenance has been updated.  LINKS immunization registry triggered.  Immunizations were reconciled.

## 2020-08-31 ENCOUNTER — LAB VISIT (OUTPATIENT)
Dept: LAB | Facility: HOSPITAL | Age: 65
End: 2020-08-31
Attending: INTERNAL MEDICINE
Payer: MEDICARE

## 2020-08-31 ENCOUNTER — OFFICE VISIT (OUTPATIENT)
Dept: INTERNAL MEDICINE | Facility: CLINIC | Age: 65
End: 2020-08-31
Payer: MEDICARE

## 2020-08-31 VITALS
SYSTOLIC BLOOD PRESSURE: 126 MMHG | OXYGEN SATURATION: 97 % | HEART RATE: 94 BPM | BODY MASS INDEX: 30.87 KG/M2 | DIASTOLIC BLOOD PRESSURE: 78 MMHG | WEIGHT: 167.75 LBS | HEIGHT: 62 IN

## 2020-08-31 DIAGNOSIS — R74.01 ELEVATED AST (SGOT): ICD-10-CM

## 2020-08-31 DIAGNOSIS — I10 ESSENTIAL HYPERTENSION: ICD-10-CM

## 2020-08-31 DIAGNOSIS — I70.0 AORTIC ATHEROSCLEROSIS: ICD-10-CM

## 2020-08-31 DIAGNOSIS — R93.429 ABNORMAL CT SCAN, KIDNEY: ICD-10-CM

## 2020-08-31 DIAGNOSIS — Z00.00 ENCOUNTER FOR PREVENTIVE HEALTH EXAMINATION: Primary | ICD-10-CM

## 2020-08-31 DIAGNOSIS — D84.9 IMMUNOCOMPROMISED PATIENT: ICD-10-CM

## 2020-08-31 DIAGNOSIS — Z94.1 HEART TRANSPLANTED: Chronic | ICD-10-CM

## 2020-08-31 DIAGNOSIS — M85.80 OSTEOPENIA, UNSPECIFIED LOCATION: ICD-10-CM

## 2020-08-31 DIAGNOSIS — E66.9 OBESITY (BMI 30.0-34.9): ICD-10-CM

## 2020-08-31 DIAGNOSIS — D64.9 ANEMIA, UNSPECIFIED TYPE: ICD-10-CM

## 2020-08-31 DIAGNOSIS — F33.9 CHRONIC MAJOR DEPRESSIVE DISORDER, RECURRENT EPISODE: ICD-10-CM

## 2020-08-31 DIAGNOSIS — M79.7 FIBROMYALGIA: ICD-10-CM

## 2020-08-31 DIAGNOSIS — N18.30 CKD (CHRONIC KIDNEY DISEASE) STAGE 3, GFR 30-59 ML/MIN: ICD-10-CM

## 2020-08-31 DIAGNOSIS — E21.3 HYPERPARATHYROIDISM: ICD-10-CM

## 2020-08-31 DIAGNOSIS — F41.9 ANXIETY: Chronic | ICD-10-CM

## 2020-08-31 PROBLEM — J01.21 ACUTE RECURRENT ETHMOIDAL SINUSITIS: Status: RESOLVED | Noted: 2020-05-28 | Resolved: 2020-08-31

## 2020-08-31 PROCEDURE — G0439 PPPS, SUBSEQ VISIT: HCPCS | Mod: HCNC,S$GLB,, | Performed by: NURSE PRACTITIONER

## 2020-08-31 PROCEDURE — G0439 PR MEDICARE ANNUAL WELLNESS SUBSEQUENT VISIT: ICD-10-PCS | Mod: HCNC,S$GLB,, | Performed by: NURSE PRACTITIONER

## 2020-08-31 PROCEDURE — 36415 COLL VENOUS BLD VENIPUNCTURE: CPT | Mod: HCNC

## 2020-08-31 PROCEDURE — 3074F PR MOST RECENT SYSTOLIC BLOOD PRESSURE < 130 MM HG: ICD-10-PCS | Mod: HCNC,CPTII,S$GLB, | Performed by: NURSE PRACTITIONER

## 2020-08-31 PROCEDURE — 99499 UNLISTED E&M SERVICE: CPT | Mod: HCNC,S$GLB,, | Performed by: NURSE PRACTITIONER

## 2020-08-31 PROCEDURE — 3074F SYST BP LT 130 MM HG: CPT | Mod: HCNC,CPTII,S$GLB, | Performed by: NURSE PRACTITIONER

## 2020-08-31 PROCEDURE — 99499 RISK ADDL DX/OHS AUDIT: ICD-10-PCS | Mod: HCNC,S$GLB,, | Performed by: NURSE PRACTITIONER

## 2020-08-31 PROCEDURE — 99999 PR PBB SHADOW E&M-EST. PATIENT-LVL III: ICD-10-PCS | Mod: PBBFAC,HCNC,, | Performed by: NURSE PRACTITIONER

## 2020-08-31 PROCEDURE — 3078F PR MOST RECENT DIASTOLIC BLOOD PRESSURE < 80 MM HG: ICD-10-PCS | Mod: HCNC,CPTII,S$GLB, | Performed by: NURSE PRACTITIONER

## 2020-08-31 PROCEDURE — 99999 PR PBB SHADOW E&M-EST. PATIENT-LVL III: CPT | Mod: PBBFAC,HCNC,, | Performed by: NURSE PRACTITIONER

## 2020-08-31 PROCEDURE — 3078F DIAST BP <80 MM HG: CPT | Mod: HCNC,CPTII,S$GLB, | Performed by: NURSE PRACTITIONER

## 2020-08-31 PROCEDURE — 80069 RENAL FUNCTION PANEL: CPT | Mod: HCNC

## 2020-08-31 NOTE — Clinical Note
Your patient was seen today for AWV. Abnormalities bolded. Please review.   Thank you,   ABELARDO Elkins

## 2020-08-31 NOTE — PROGRESS NOTES
"  Nadia Damon presented for a  Medicare AWV and comprehensive Health Risk Assessment today. The following components were reviewed and updated:    · Medical history  · Family History  · Social history  · Allergies and Current Medications  · Health Risk Assessment  · Health Maintenance  · Care Team     ** See Completed Assessments for Annual Wellness Visit within the encounter summary.**         The following assessments were completed:  · Living Situation  · CAGE  · Depression Screening  · Timed Get Up and Go  · Whisper Test  · Cognitive Function Screening  · Nutrition Screening  · ADL Screening  · PAQ Screening        Vitals:    08/31/20 1031   BP: 126/78   BP Location: Right arm   Patient Position: Sitting   BP Method: Medium (Manual)   Pulse: 94   SpO2: 97%   Weight: 76.1 kg (167 lb 12.3 oz)   Height: 5' 2" (1.575 m)     Body mass index is 30.69 kg/m².  Physical Exam  Vitals signs and nursing note reviewed.   Constitutional:       Appearance: She is well-developed.   HENT:      Head: Normocephalic.   Cardiovascular:      Rate and Rhythm: Normal rate and regular rhythm.      Heart sounds: Normal heart sounds. No murmur.   Pulmonary:      Effort: Pulmonary effort is normal. No respiratory distress.      Breath sounds: Normal breath sounds.   Abdominal:      Palpations: Abdomen is soft. There is no mass.      Tenderness: There is no abdominal tenderness.   Musculoskeletal: Normal range of motion.   Skin:     General: Skin is warm and dry.   Neurological:      Mental Status: She is alert and oriented to person, place, and time.      Motor: No abnormal muscle tone.   Psychiatric:         Speech: Speech normal.         Behavior: Behavior normal.               Diagnoses and health risks identified today and associated recommendations/orders:    1. Encounter for preventive health examination  She will discuss tetanus vaccine with transplant team.     MA to schedule  Optometry    She will sign RIDDHI for outside " mammogram.     2. Essential hypertension  Stable and controlled. Continue current treatment plan as previously prescribed with your PCP.     3. Aortic atherosclerosis  cxr 8/2016  Stable and controlled. Continue current treatment plan as previously prescribed with your PCP.     4. Heart transplanted  Continue current treatment plan as previously prescribed with your transplant team.     5. Immunocompromised patient  See #4    6. CKD (chronic kidney disease) stage 3, GFR 30-59 ml/min  Stable. Continue current treatment plan as previously prescribed with your PCP and nephrologist.     7. Hyperparathyroidism  Decreased from prior check. Continue current treatment plan as previously prescribed with your nephrologist.     8. Abnormal CT scan, kidney  Ct 3/2020  Advised to follow up with PCP/nephrologist for further evaluation and recommendations. She expressed understanding.      9. Fibromyalgia  Continue current treatment plan as previously prescribed with your PCP.     10. Osteopenia, unspecified location  DEXA 7/2019  Continue current treatment plan as previously prescribed with your rheumatologist.     11. Chronic major depressive disorder, recurrent episode  PHQ 2-0  Stable and controlled. Continue current treatment plan as previously prescribed with your PCP.     12. Anxiety  See #11    13. Elevated AST (SGOT)  Advised to follow up with PCP for further evaluation and recommendations. She expressed understanding.      14. Obesity (BMI 30.0-34.9)  Continue current treatment plan as previously prescribed with your PCP.     15. Anemia, unspecified type  Advised to follow up with PCP/nephrologist for further evaluation and recommendations. She expressed understanding.        Provided Nadia with a 5-10 year written screening schedule and personal prevention plan. Recommendations were developed using the USPSTF age appropriate recommendations. Education, counseling, and referrals were provided as needed. After Visit  Summary printed and given to patient which includes a list of additional screenings\tests needed.    Follow up in about 1 year (around 8/31/2021) for awv.    Abbie Huerta NP  I offered to discuss end of life issues, including information on how to make advance directives that the patient could use to name someone who would make medical decisions on their behalf if they became too ill to make themselves.    ___Patient declined  _X_Patient is interested, I provided paper work and offered to discuss.

## 2020-08-31 NOTE — PATIENT INSTRUCTIONS
Counseling and Referral of Other Preventative  (Italic type indicates deductible and co-insurance are waived)    Patient Name: Nadia Damon  Today's Date: 8/31/2020    Health Maintenance       Date Due Completion Date    TETANUS VACCINE 07/16/1973 ---    Lipid Panel 02/13/2020 2/13/2019    Mammogram 10/03/2020 10/3/2019    Override on 3/27/2018: Done    Override on 2/24/2016: Done ([facility]ow [facility] at [facility])    Influenza Vaccine (1) 09/01/2020 10/29/2019    Shingles Vaccine (2 of 2) 10/06/2020 8/11/2020    DEXA SCAN 07/30/2022 7/30/2019    Pneumococcal Vaccine (65+ High/Highest Risk) (2 of 2 - PPSV23) 10/22/2023 10/22/2018    Colorectal Cancer Screening 02/03/2024 2/3/2014 (Done)    Override on 2/3/2014: Done (had brbpr  approx date  at [facility])        No orders of the defined types were placed in this encounter.    The following information is provided to all patients.  This information is to help you find resources for any of the problems found today that may be affecting your health:                Living healthy guide: www.[facility].[place].gov      Understanding Diabetes: www.diabetes.org      Eating healthy: www.cdc.gov/healthyweight      CDC home safety checklist: www.cdc.gov/steadi/patient.html      Agency on Aging: www.goea.[place].[facility]      Alcoholics anonymous (AA): www.aa.org      Physical Activity: www.thea.nih.gov/xf9cxuo      Tobacco use: www.quitwithusla.org

## 2020-09-01 ENCOUNTER — OFFICE VISIT (OUTPATIENT)
Dept: NEPHROLOGY | Facility: CLINIC | Age: 65
End: 2020-09-01
Payer: MEDICARE

## 2020-09-01 ENCOUNTER — OFFICE VISIT (OUTPATIENT)
Dept: OPHTHALMOLOGY | Facility: CLINIC | Age: 65
End: 2020-09-01
Payer: MEDICARE

## 2020-09-01 VITALS
SYSTOLIC BLOOD PRESSURE: 140 MMHG | HEART RATE: 80 BPM | BODY MASS INDEX: 30.71 KG/M2 | HEIGHT: 62 IN | DIASTOLIC BLOOD PRESSURE: 80 MMHG | WEIGHT: 166.88 LBS

## 2020-09-01 DIAGNOSIS — I10 ESSENTIAL HYPERTENSION: ICD-10-CM

## 2020-09-01 DIAGNOSIS — H52.7 REFRACTIVE ERROR: ICD-10-CM

## 2020-09-01 DIAGNOSIS — H25.13 CATARACT, NUCLEAR SCLEROTIC SENILE, BILATERAL: Primary | ICD-10-CM

## 2020-09-01 DIAGNOSIS — D84.9 IMMUNOSUPPRESSION: Primary | ICD-10-CM

## 2020-09-01 DIAGNOSIS — N04.9 NEPHROTIC SYNDROME: ICD-10-CM

## 2020-09-01 LAB
ALBUMIN SERPL BCP-MCNC: 3 G/DL (ref 3.5–5.2)
ANION GAP SERPL CALC-SCNC: 12 MMOL/L (ref 8–16)
BUN SERPL-MCNC: 36 MG/DL (ref 8–23)
CALCIUM SERPL-MCNC: 8.3 MG/DL (ref 8.7–10.5)
CHLORIDE SERPL-SCNC: 106 MMOL/L (ref 95–110)
CO2 SERPL-SCNC: 24 MMOL/L (ref 23–29)
CREAT SERPL-MCNC: 2.1 MG/DL (ref 0.5–1.4)
EST. GFR  (AFRICAN AMERICAN): 27.7 ML/MIN/1.73 M^2
EST. GFR  (NON AFRICAN AMERICAN): 24 ML/MIN/1.73 M^2
GLUCOSE SERPL-MCNC: 89 MG/DL (ref 70–110)
PHOSPHATE SERPL-MCNC: 3.8 MG/DL (ref 2.7–4.5)
POTASSIUM SERPL-SCNC: 4.8 MMOL/L (ref 3.5–5.1)
SODIUM SERPL-SCNC: 142 MMOL/L (ref 136–145)

## 2020-09-01 PROCEDURE — 99999 PR PBB SHADOW E&M-EST. PATIENT-LVL I: CPT | Mod: PBBFAC,HCNC,, | Performed by: OPTOMETRIST

## 2020-09-01 PROCEDURE — 1159F MED LIST DOCD IN RCRD: CPT | Mod: HCNC,S$GLB,, | Performed by: INTERNAL MEDICINE

## 2020-09-01 PROCEDURE — 3079F DIAST BP 80-89 MM HG: CPT | Mod: HCNC,CPTII,S$GLB, | Performed by: INTERNAL MEDICINE

## 2020-09-01 PROCEDURE — 1159F PR MEDICATION LIST DOCUMENTED IN MEDICAL RECORD: ICD-10-PCS | Mod: HCNC,S$GLB,, | Performed by: INTERNAL MEDICINE

## 2020-09-01 PROCEDURE — 1126F AMNT PAIN NOTED NONE PRSNT: CPT | Mod: HCNC,S$GLB,, | Performed by: INTERNAL MEDICINE

## 2020-09-01 PROCEDURE — 92014 PR EYE EXAM, EST PATIENT,COMPREHESV: ICD-10-PCS | Mod: HCNC,S$GLB,, | Performed by: OPTOMETRIST

## 2020-09-01 PROCEDURE — 3077F PR MOST RECENT SYSTOLIC BLOOD PRESSURE >= 140 MM HG: ICD-10-PCS | Mod: HCNC,CPTII,S$GLB, | Performed by: INTERNAL MEDICINE

## 2020-09-01 PROCEDURE — 99215 PR OFFICE/OUTPT VISIT, EST, LEVL V, 40-54 MIN: ICD-10-PCS | Mod: HCNC,S$GLB,, | Performed by: INTERNAL MEDICINE

## 2020-09-01 PROCEDURE — 99999 PR PBB SHADOW E&M-EST. PATIENT-LVL I: ICD-10-PCS | Mod: PBBFAC,HCNC,, | Performed by: OPTOMETRIST

## 2020-09-01 PROCEDURE — 99215 OFFICE O/P EST HI 40 MIN: CPT | Mod: HCNC,S$GLB,, | Performed by: INTERNAL MEDICINE

## 2020-09-01 PROCEDURE — 3077F SYST BP >= 140 MM HG: CPT | Mod: HCNC,CPTII,S$GLB, | Performed by: INTERNAL MEDICINE

## 2020-09-01 PROCEDURE — 3008F PR BODY MASS INDEX (BMI) DOCUMENTED: ICD-10-PCS | Mod: HCNC,CPTII,S$GLB, | Performed by: INTERNAL MEDICINE

## 2020-09-01 PROCEDURE — 3008F BODY MASS INDEX DOCD: CPT | Mod: HCNC,CPTII,S$GLB, | Performed by: INTERNAL MEDICINE

## 2020-09-01 PROCEDURE — 1101F PT FALLS ASSESS-DOCD LE1/YR: CPT | Mod: HCNC,CPTII,S$GLB, | Performed by: INTERNAL MEDICINE

## 2020-09-01 PROCEDURE — 92015 DETERMINE REFRACTIVE STATE: CPT | Mod: HCNC,S$GLB,, | Performed by: OPTOMETRIST

## 2020-09-01 PROCEDURE — 99999 PR PBB SHADOW E&M-EST. PATIENT-LVL IV: ICD-10-PCS | Mod: PBBFAC,HCNC,, | Performed by: INTERNAL MEDICINE

## 2020-09-01 PROCEDURE — 92014 COMPRE OPH EXAM EST PT 1/>: CPT | Mod: HCNC,S$GLB,, | Performed by: OPTOMETRIST

## 2020-09-01 PROCEDURE — 1126F PR PAIN SEVERITY QUANTIFIED, NO PAIN PRESENT: ICD-10-PCS | Mod: HCNC,S$GLB,, | Performed by: INTERNAL MEDICINE

## 2020-09-01 PROCEDURE — 3079F PR MOST RECENT DIASTOLIC BLOOD PRESSURE 80-89 MM HG: ICD-10-PCS | Mod: HCNC,CPTII,S$GLB, | Performed by: INTERNAL MEDICINE

## 2020-09-01 PROCEDURE — 99999 PR PBB SHADOW E&M-EST. PATIENT-LVL IV: CPT | Mod: PBBFAC,HCNC,, | Performed by: INTERNAL MEDICINE

## 2020-09-01 PROCEDURE — 1101F PR PT FALLS ASSESS DOC 0-1 FALLS W/OUT INJ PAST YR: ICD-10-PCS | Mod: HCNC,CPTII,S$GLB, | Performed by: INTERNAL MEDICINE

## 2020-09-01 PROCEDURE — 92015 PR REFRACTION: ICD-10-PCS | Mod: HCNC,S$GLB,, | Performed by: OPTOMETRIST

## 2020-09-01 RX ORDER — FUROSEMIDE 20 MG/1
20 TABLET ORAL DAILY
Qty: 90 TABLET | Refills: 3 | Status: SHIPPED | OUTPATIENT
Start: 2020-09-01 | End: 2020-10-03

## 2020-09-01 RX ORDER — CLONIDINE HYDROCHLORIDE 0.1 MG/1
0.1 TABLET ORAL NIGHTLY
Qty: 90 TABLET | Refills: 3 | Status: SHIPPED | OUTPATIENT
Start: 2020-09-01 | End: 2021-05-06 | Stop reason: SDUPTHER

## 2020-09-01 RX ORDER — LOSARTAN POTASSIUM 25 MG/1
25 TABLET ORAL DAILY
Qty: 90 TABLET | Refills: 3 | Status: SHIPPED | OUTPATIENT
Start: 2020-09-01 | End: 2021-05-06 | Stop reason: SDUPTHER

## 2020-09-01 NOTE — PROGRESS NOTES
HPI     Decreased near visual acuity with glasses.  Last eye exam 05/24/2019 TRF.  Update glasses RX.  Patient didn't get glasses prescription filled from last year.    Last edited by Paxton Vasques, OD on 9/1/2020  9:57 AM. (History)            Assessment /Plan     For exam results, see Encounter Report.    Cataract, nuclear sclerotic senile, bilateral    Essential hypertension    Refractive error      Moderate cataracts OU, not surgical    No HTN Retinopathy    Dispense Final Rx for glasses.  RTC 1 year  Discussed above and answered questions.

## 2020-09-01 NOTE — PROGRESS NOTES
NEPHROLOGY CLINIC FOLLOWUP NOTE  Date of clinic visit: 9/1/20     REASON FOR FOLLOWUP AND CHIEF COMPLAINT: nephrolithiasis, hypercalcemia, and history of chronic kidney disease post-heart transplant.     HISTORY OF PRESENT ILLNESS: Ms. Damon is a 65-year-old female with a history of CKD stage 3, nephrolithiasis (likely mixed stones), and heart transplant in 1993 (is on cyclosporine), who presents for f/u. Pt was last seen by us about 1 month ago. Pt returns sooner for BP mgmt. Chart was reviewed. Noted multiple providers are making changes to the BP meds. She has slowly worsening s Cr and has 6 g of proteinuria (nephrotic syndrome). 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 Losartan was added last visit by us for renal protection, proteinuria, and for BP mgmt. Pt at present still not taking it. Lasix was increased tp 20 mg bid, she is taking it qod. Noted clonidine was increased to bid by PCP.     To review: Pt has had mild, persistent hypercalcemia and increase in iPTH. Primary hyperparathyroidism (HPT) is suspected. Sestamibi nuclear scan of the neck did not show any adenomas. She is on a very low dose of PO lasix (10) mg to control s Ca.      On f/u today, pt has no acute or new c/o's. She denies eating salty foods. She is compliant with all the meds. Pt reports no new kidney stones. No discomfort today. HCTZ was previosuly stopped already due to low BP and risk of hypercalcemia. Her risk factors for kidney stones include primary hyperparathyroidism (HPT) and being on cyclosporine for prevention of heart transplant rejection, which causes hyperuricemia. Pt has a h/o of osteopenia.     In addition, she has chronically mild low serum albumin. She has no volume overload/CHF, or liver disease. A review of the records showed that s alb has been low at least for 9 years. Noted that she also has mild proteinuria. She has no leg swelling or SOB.        To review:   Pt previously  developed right sided back pain, thought to be related to kidney stones identified on renal u/s. The stones in the R kidney were non-obstructive but were relatively large at 10 and 12 mm each. Based on h/o of taking cyclosporine to prevent heart transplant rejection and eating a lot of sea food (shrimp and crawfish), uric acid stones were suspected. Pt had additional risk factors for non-uric acid stones, including taking Vit C 1 g/day and Vit D. After dietary modification, the flank pain resolved. A f/u CT scan did not show any stones. It is possible that she passed the 2 stones, as uric acid stones are soluble on urinary alkalinization and can pass by themselves. Pt was advised to stop Vit C and Vit D, which she has. She was also advised on drinking freshly squeezed lemon juice and follow dietary recommendations to avoid nephrolithiasis risk factors. Pt was referred also to urology. A repeat CT scan did not show any stones. Pt obtained a 24 hour urine collection for kidney stone risk stratification which showed mild hypocitraturia.         PAST MEDICAL HISTORY:  1. CKD stage III. 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 Hypertension.  3. Heart transplant for cardiomyopathy in 1993, the patient has been on chronic  cyclosporine treatment.  4. Carpal tunnel syndrome.  5. Fibromyalgia.  6. GERD.  7. Bell's palsy.  8. Depression.     PAST SURGICAL HISTORY: Reviewed as above, unchanged.     MEDICATIONS: Reviewed    Current Outpatient Medications:     acetaminophen (TYLENOL ORAL), Take by mouth., Disp: , Rfl:     acetaminophen 500 mg/15 mL Liqd, , Disp: , Rfl:     aspirin (ECOTRIN) 81 MG EC tablet, Take 1 tablet (81 mg total) by mouth once daily., Disp: 30 tablet, Rfl: 11    baclofen (LIORESAL) 10 MG tablet, TAKE 1 TABLET THREE TIMES DAILY AS NEEDED FOR MUSCLE SPASMS, Disp: 270 tablet, Rfl: 0    biotin 10,000 mcg Cap, Take 1 tablet by mouth once  daily., Disp: , Rfl:     busPIRone (BUSPAR) 10 MG tablet, TAKE 1 TABLET EVERY DAY, Disp: 90 tablet, Rfl: 1    clobetasol (TEMOVATE) 0.05 % external solution, Apply topically 2 (two) times daily. Apply to the scalp., Disp: 50 mL, Rfl: 2    cloNIDine (CATAPRES) 0.1 MG tablet, Take 1 tablet (0.1 mg total) by mouth every evening., Disp: 90 tablet, Rfl: 3    cycloSPORINE modified, NEORAL, (NEORAL) 100 MG capsule, TAKE 1 CAPSULE EVERY DAY, Disp: 90 capsule, Rfl: 0    cycloSPORINE modified, NEORAL, (NEORAL) 25 MG capsule, TAKE 3 CAPSULES EVERY DAY, Disp: 270 capsule, Rfl: 0    DULoxetine (CYMBALTA) 30 MG capsule, TAKE 1 CAPSULE EVERY DAY, Disp: 90 capsule, Rfl: 1    EVENING PRIMROSE OIL ORAL, Take 1,000 mg by mouth once daily., Disp: , Rfl:     ferrous sulfate (FEOSOL) 325 mg (65 mg iron) Tab tablet, Take 1 tablet (325 mg total) by mouth once daily., Disp: 100 tablet, Rfl: 3    fluticasone (FLONASE) 50 mcg/actuation nasal spray, 2 sprays by Each Nare route once daily., Disp: 1 Bottle, Rfl: 0    furosemide (LASIX) 20 MG tablet, Take 1 tablet (20 mg total) by mouth once daily., Disp: 90 tablet, Rfl: 3    hydrALAZINE (APRESOLINE) 50 MG tablet, Take 2 tablets (100 mg total) by mouth every 8 (eight) hours., Disp: 90 tablet, Rfl: 11    leg brace (ANKLE SUPPORT MISC), , Disp: , Rfl:     losartan (COZAAR) 25 MG tablet, Take 1 tablet (25 mg total) by mouth once daily., Disp: 90 tablet, Rfl: 3    metoprolol succinate (TOPROL-XL) 25 MG 24 hr tablet, TAKE 1 TABLET EVERY DAY (Patient taking differently: Take 50 mg by mouth. 25mg 2 tabs daily), Disp: 90 tablet, Rfl: 3    multivitamin capsule, Take 1 capsule by mouth once daily., Disp: , Rfl:     omeprazole (PRILOSEC) 10 MG capsule, TAKE 1 CAPSULE EVERY DAY, Disp: 90 capsule, Rfl: 0    pregabalin (LYRICA) 50 MG capsule, Take 1 capsule (50 mg total) by mouth 2 (two) times daily., Disp: 60 capsule, Rfl: 4    temazepam (RESTORIL) 30 mg capsule, TAKE 1 CAPSULE BY MOUTH  EVERYDAY AT BEDTIME, Disp: 30 capsule, Rfl: 2    vitamin E 400 UNIT capsule, Take 400 Units by mouth once daily., Disp: , Rfl:     zolpidem (AMBIEN) 10 mg Tab, TAKE 1 TABLET BY MOUTH ONCE DAILY IN THE EVENING, Disp: 90 tablet, Rfl: 1    mometasone (ELOCON) 0.1 % lotion, Apply topically once daily., Disp: 180 mL, Rfl: 3    Current Facility-Administered Medications:     denosumab (PROLIA) injection 60 mg, 60 mg, Subcutaneous, Q6 Months, Prasanth Johnson MD    triamcinolone acetonide injection 10 mg, 10 mg, Intradermal, 1 time in Clinic/HOD, Jose Roland MD    SOCIAL HISTORY: Reviewed. Negative for smoking. No alcohol use.      REVIEW OF SYSTEMS: No recent hospitalizations.  GENERAL: Negative.  HEAD, EYES, EARS, NOSE, AND THROAT: Negative.  CARDIAC: Negative.  PULMONARY: Negative.  GASTROINTESTINAL: Negative.  GENITOURINARY: Negative.  PSYCHOLOGICAL: Negative.  NEUROLOGICAL: Negative.  ENDOCRINE: Negative.  HEMATOLOGIC AND ONCOLOGIC: Negative.  INFECTIOUS DISEASE: Negative.  The rest of the review of systems negative.     PHYSICAL EXAMINATION:  VITAL SIGNS: Repeat blood pressure is 140/80, Pulse is 80, weight is 785.7 Kg, from 76.5 Kg  GENERAL: She is cooperative, pleasant, in no acute distress.   Speech and thought process appropriate, normal.  HEENT: Mucous membranes moist.  NECK: Neck JVD. Normal hearing.  ABDOMEN: Soft, nontender.  EXTREMITIES: trace pitting edema.     LABS: Reviewed.   BMP  Lab Results   Component Value Date     08/31/2020    K 4.8 08/31/2020     08/31/2020    CO2 24 08/31/2020    BUN 36 (H) 08/31/2020    CREATININE 2.1 (H) 08/31/2020    CALCIUM 8.3 (L) 08/31/2020    ANIONGAP 12 08/31/2020    ESTGFRAFRICA 27.7 (A) 08/31/2020    EGFRNONAA 24.0 (A) 08/31/2020     Lab Results   Component Value Date    WBC 3.24 (L) 07/07/2020    HGB 10.6 (L) 07/07/2020    HCT 34.2 (L) 07/07/2020    MCV 92 07/07/2020     07/07/2020     Lab Results   Component Value Date    .0 (H)  01/10/2020    CALCIUM 8.3 (L) 08/31/2020    CAION 1.13 09/05/2018    PHOS 3.8 08/31/2020       Urine protein/cr 6 g  U/a: 3+ protein, no blood, 4 RBC's, no casts     To review older labs:   24 hour urine: Ca not measured, uric acid 138, Oxalate 20, citrate 203  U/a unremarkable  Urine Cx: neg   Urine P/Cr 740 mg     KUB: unremarkable, except for some constipation     Renal u/s: FINDINGS:  Right kidney: The right kidney measures 9.6 cm. Mild cortical thinning and increased cortical echogenicity.  10 and 12 mm stones are seen in the upper and lower poles respectively.  No cortical thinning. No loss of corticomedullary distinction. No mass. No renal stone. No hydronephrosis.  Left kidney: The left kidney measures 9.3 cm. Mild cortical thinning and increased cortical echogenicity. No loss of corticomedullary distinction. No mass. No renal stone. No hydronephrosis.  The bladder is partially distended at the time of scanning and has an unremarkable appearance.     CT abd: The left kidney appears slightly small.  Right kidney is grossly normal in size.  No obvious hydronephrosis or nephrolithiasis     Sestamibi nuclear scan of the neck: 8/29/19: no adenomas              ASSESSMENT AND PLAN: This is a 65-year-old female who presents for follow up of hypercalcemia and suspected primary HPT. Pt is noted to be worse with uncontrolled BP and nephrotic syndrome:  Patient has a h/o of heart transplant  The impression is as follows:     1. Renal: s Cr has always fluctuated in this pt, however the general trends appears to be slow worsening  New, less easily controlled BP  Worsened proteinuria, now nephrotic syndrome  Why does pt have nephrotic syndrome? And uncontrolled HTN?    Reason not clear: DDX: related to cyclosporine: calcineurin inhibitor nephrotoxicity  Will benefit from a kidney biopsy  Discussed with pt, expleined, pt agreeable  Will order renal u/s.  If kidney size is not small, will proceed with plans for a kidney  biopsy     2. Nephrolithiasis: no new stones.  Has multiple risk factors for kidney stones: (hyperuricemia from cyclosporine, diet rich in uric acid, previously taking Vit C, vit D, and calcium,, CKD, and having primary hyperparathyroidism)  Stones are likely mixed ca oxalate and uric acid kidney stones  No longer taking Vit C  No longer takes Vit D and Ca.   Mild hyperuricemia, from cyclosporine.  Mild persistent hypercalcemia (corrected ca for low albumin is slightly elevated)  F/u CT did not show any stones  U/s showed non-obstructive 2 R stones, relatively large  May have passed the stones  24 hour urine for kidney stone stratification shows hypocitraturia  Pt was advised NOT TO STOP cyclosporine.     3. HTN: BP better controlled. Accelerated HTN. Due to nephrotic syndrome  Reviewed meds with pt  Meds reviewed  The following are recommended:  Start losartan 25 mg po qd (renal protection, anti-proteinuric)  Change lasix to 20 mg po qd  Decrease clonidine 0.1 mg from bid to q hs (has dry mouth)     4. History of heart transplant on cyclosporine  Per heart transplant team.      5. Elevated iPTH, likely has primary hyperparathyroidism (HPT)  No adenomas found on sestamibi nulcear scan  May have diffuse hyperplasia  Primary HPT can explain increased risk of kidney stones     6. Leukopenia noted: mild.  Likely due to CSA.  Advised pt to follow up with heart [place] [facility]        PLANS AND RECOMMENDATIONS:   As discussed above  Opportunity for questions provided  Complicated case, multiple issues addressed  RTC 1 month  Total time spent 40 minutes. Extensive time spent including the time to review the records, the patient evaluation, documentation, face-to-face  discussion with the patient. More than 50% of the time was spent in counseling  and coordination of care. Level V visit.     Carter Crawford MD

## 2020-09-04 ENCOUNTER — PATIENT OUTREACH (OUTPATIENT)
Dept: ADMINISTRATIVE | Facility: HOSPITAL | Age: 65
End: 2020-09-04

## 2020-09-04 ENCOUNTER — TELEPHONE (OUTPATIENT)
Dept: NEPHROLOGY | Facility: CLINIC | Age: 65
End: 2020-09-04

## 2020-09-04 ENCOUNTER — TELEPHONE (OUTPATIENT)
Dept: TRANSPLANT | Facility: CLINIC | Age: 65
End: 2020-09-04

## 2020-09-04 ENCOUNTER — LAB VISIT (OUTPATIENT)
Dept: LAB | Facility: HOSPITAL | Age: 65
End: 2020-09-04
Attending: INTERNAL MEDICINE
Payer: MEDICARE

## 2020-09-04 DIAGNOSIS — N04.9 NEPHROTIC SYNDROME: ICD-10-CM

## 2020-09-04 DIAGNOSIS — N04.9 NEPHROTIC SYNDROME: Primary | ICD-10-CM

## 2020-09-04 LAB
ALBUMIN SERPL BCP-MCNC: 3.1 G/DL (ref 3.5–5.2)
ANION GAP SERPL CALC-SCNC: 13 MMOL/L (ref 8–16)
BUN SERPL-MCNC: 42 MG/DL (ref 8–23)
CALCIUM SERPL-MCNC: 8.3 MG/DL (ref 8.7–10.5)
CHLORIDE SERPL-SCNC: 105 MMOL/L (ref 95–110)
CO2 SERPL-SCNC: 23 MMOL/L (ref 23–29)
CREAT SERPL-MCNC: 2.4 MG/DL (ref 0.5–1.4)
EST. GFR  (AFRICAN AMERICAN): 24 ML/MIN/1.73 M^2
EST. GFR  (NON AFRICAN AMERICAN): 20 ML/MIN/1.73 M^2
GLUCOSE SERPL-MCNC: 115 MG/DL (ref 70–110)
PHOSPHATE SERPL-MCNC: 4 MG/DL (ref 2.7–4.5)
POTASSIUM SERPL-SCNC: 5.1 MMOL/L (ref 3.5–5.1)
SODIUM SERPL-SCNC: 141 MMOL/L (ref 136–145)

## 2020-09-04 PROCEDURE — 36415 COLL VENOUS BLD VENIPUNCTURE: CPT | Mod: HCNC

## 2020-09-04 PROCEDURE — 80069 RENAL FUNCTION PANEL: CPT | Mod: HCNC

## 2020-09-04 NOTE — TELEPHONE ENCOUNTER
Pt called stated that she is experiencing leg cramps and Diarrhea since starting a medication  Started on Tuesday 9/1.  I encouraged her to call to Dr. Crawford to discuss what options there are for her.  That it is important to let him know.  Pt stated that she will be calling to them.    Pt and I discussed her post heart transplant immunizations and yes she can have immunizations as well as they are not live immunizations.

## 2020-09-04 NOTE — PROGRESS NOTES
Received RIDDHI, scanned into media for mammogram. Patient's recent mammogram of 06/17/2020 up to date.

## 2020-09-04 NOTE — TELEPHONE ENCOUNTER
Patient states she is having severe leg cramps since she started Losartan. Renal function panel ordered to check patient levels.

## 2020-09-04 NOTE — TELEPHONE ENCOUNTER
----- Message from Marily Martinez sent at 9/4/2020 11:30 AM CDT -----  Contact: self - [phone]  Would like to consult with nurse regarding muscle pains and  medication ( losartan 25mg). Please call back at [phone]. Thanks

## 2020-09-09 ENCOUNTER — LAB VISIT (OUTPATIENT)
Dept: LAB | Facility: HOSPITAL | Age: 65
End: 2020-09-09
Attending: FAMILY MEDICINE
Payer: MEDICARE

## 2020-09-09 ENCOUNTER — IMMUNIZATION (OUTPATIENT)
Dept: PHARMACY | Facility: CLINIC | Age: 65
End: 2020-09-09
Payer: MEDICARE

## 2020-09-09 ENCOUNTER — OFFICE VISIT (OUTPATIENT)
Dept: INTERNAL MEDICINE | Facility: CLINIC | Age: 65
End: 2020-09-09
Payer: MEDICARE

## 2020-09-09 VITALS
TEMPERATURE: 97 F | HEIGHT: 62 IN | DIASTOLIC BLOOD PRESSURE: 86 MMHG | BODY MASS INDEX: 30.71 KG/M2 | OXYGEN SATURATION: 98 % | HEART RATE: 92 BPM | WEIGHT: 166.88 LBS | SYSTOLIC BLOOD PRESSURE: 140 MMHG

## 2020-09-09 DIAGNOSIS — I10 ESSENTIAL HYPERTENSION: Chronic | ICD-10-CM

## 2020-09-09 DIAGNOSIS — R25.2 MUSCLE CRAMPS: ICD-10-CM

## 2020-09-09 DIAGNOSIS — R25.2 MUSCLE CRAMPS: Primary | ICD-10-CM

## 2020-09-09 PROCEDURE — 3077F SYST BP >= 140 MM HG: CPT | Mod: HCNC,CPTII,S$GLB, | Performed by: FAMILY MEDICINE

## 2020-09-09 PROCEDURE — 99999 PR PBB SHADOW E&M-EST. PATIENT-LVL V: CPT | Mod: PBBFAC,HCNC,, | Performed by: FAMILY MEDICINE

## 2020-09-09 PROCEDURE — 3008F PR BODY MASS INDEX (BMI) DOCUMENTED: ICD-10-PCS | Mod: HCNC,CPTII,S$GLB, | Performed by: FAMILY MEDICINE

## 2020-09-09 PROCEDURE — 99999 PR PBB SHADOW E&M-EST. PATIENT-LVL V: ICD-10-PCS | Mod: PBBFAC,HCNC,, | Performed by: FAMILY MEDICINE

## 2020-09-09 PROCEDURE — 3008F BODY MASS INDEX DOCD: CPT | Mod: HCNC,CPTII,S$GLB, | Performed by: FAMILY MEDICINE

## 2020-09-09 PROCEDURE — 3077F PR MOST RECENT SYSTOLIC BLOOD PRESSURE >= 140 MM HG: ICD-10-PCS | Mod: HCNC,CPTII,S$GLB, | Performed by: FAMILY MEDICINE

## 2020-09-09 PROCEDURE — 1159F PR MEDICATION LIST DOCUMENTED IN MEDICAL RECORD: ICD-10-PCS | Mod: HCNC,S$GLB,, | Performed by: FAMILY MEDICINE

## 2020-09-09 PROCEDURE — 1126F PR PAIN SEVERITY QUANTIFIED, NO PAIN PRESENT: ICD-10-PCS | Mod: HCNC,S$GLB,, | Performed by: FAMILY MEDICINE

## 2020-09-09 PROCEDURE — 1159F MED LIST DOCD IN RCRD: CPT | Mod: HCNC,S$GLB,, | Performed by: FAMILY MEDICINE

## 2020-09-09 PROCEDURE — 1101F PT FALLS ASSESS-DOCD LE1/YR: CPT | Mod: HCNC,CPTII,S$GLB, | Performed by: FAMILY MEDICINE

## 2020-09-09 PROCEDURE — 99213 PR OFFICE/OUTPT VISIT, EST, LEVL III, 20-29 MIN: ICD-10-PCS | Mod: HCNC,S$GLB,, | Performed by: FAMILY MEDICINE

## 2020-09-09 PROCEDURE — 3079F PR MOST RECENT DIASTOLIC BLOOD PRESSURE 80-89 MM HG: ICD-10-PCS | Mod: HCNC,CPTII,S$GLB, | Performed by: FAMILY MEDICINE

## 2020-09-09 PROCEDURE — 36415 COLL VENOUS BLD VENIPUNCTURE: CPT | Mod: HCNC

## 2020-09-09 PROCEDURE — 99213 OFFICE O/P EST LOW 20 MIN: CPT | Mod: HCNC,S$GLB,, | Performed by: FAMILY MEDICINE

## 2020-09-09 PROCEDURE — 83735 ASSAY OF MAGNESIUM: CPT | Mod: HCNC

## 2020-09-09 PROCEDURE — 1126F AMNT PAIN NOTED NONE PRSNT: CPT | Mod: HCNC,S$GLB,, | Performed by: FAMILY MEDICINE

## 2020-09-09 PROCEDURE — 1101F PR PT FALLS ASSESS DOC 0-1 FALLS W/OUT INJ PAST YR: ICD-10-PCS | Mod: HCNC,CPTII,S$GLB, | Performed by: FAMILY MEDICINE

## 2020-09-09 PROCEDURE — 3079F DIAST BP 80-89 MM HG: CPT | Mod: HCNC,CPTII,S$GLB, | Performed by: FAMILY MEDICINE

## 2020-09-09 RX ORDER — METOPROLOL SUCCINATE 25 MG/1
25 TABLET, EXTENDED RELEASE ORAL DAILY
Qty: 90 TABLET | Refills: 3 | Status: SHIPPED | OUTPATIENT
Start: 2020-09-09 | End: 2020-09-10 | Stop reason: SDUPTHER

## 2020-09-09 NOTE — PROGRESS NOTES
Subjective:       Patient ID: Nadia Damon is a 65 y.o. female.    Chief Complaint: Medication Problem    She is seen by Nephrology for muscle cramps elevated blood pressure proteinuria.  Medications were adjusted clonidine taking now once at bedtime Lasix was decreased to 20 mg once a day losartan 25 mg a day was added.  She is also due for Tdap immunization.    Review of Systems   Constitutional: Negative for chills and fatigue.   Respiratory: Negative for cough, chest tightness, shortness of breath and wheezing.    Cardiovascular: Negative for chest pain, palpitations and leg swelling.   Gastrointestinal: Negative for abdominal distention, abdominal pain and nausea.   Musculoskeletal:        Leg muscle cramps have resolved she still has some cramping in her hands.   Neurological: Negative for dizziness, weakness, light-headedness and headaches.       Objective:      Physical Exam  Constitutional:       General: She is not in acute distress.     Appearance: She is not diaphoretic.   Cardiovascular:      Rate and Rhythm: Normal rate and regular rhythm.      Heart sounds: No friction rub. No gallop.    Pulmonary:      Effort: Pulmonary effort is normal. No respiratory distress.      Breath sounds: Normal breath sounds. No wheezing, rhonchi or rales.   Skin:     General: Skin is warm and dry.   Neurological:      Mental Status: She is alert and oriented to person, place, and time.   Psychiatric:         Thought Content: Thought content normal.         Judgment: Judgment normal.         Lab Visit on 09/04/2020   Component Date Value Ref Range Status    Glucose 09/04/2020 115* 70 - 110 mg/dL Final    Sodium 09/04/2020 141  136 - 145 mmol/L Final    Potassium 09/04/2020 5.1  3.5 - 5.1 mmol/L Final    Chloride 09/04/2020 105  95 - 110 mmol/L Final    CO2 09/04/2020 23  23 - 29 mmol/L Final    BUN, Bld 09/04/2020 42* 8 - 23 mg/dL Final    Calcium 09/04/2020 8.3* 8.7 - 10.5 mg/dL Final    Creatinine 09/04/2020  2.4* 0.5 - 1.4 mg/dL Final    Albumin 09/04/2020 3.1* 3.5 - 5.2 g/dL Final    Phosphorus 09/04/2020 4.0  2.7 - 4.5 mg/dL Final    eGFR if African American 09/04/2020 24* >60 mL/min/1.73 m^2 Final    eGFR if non African American 09/04/2020 20* >60 mL/min/1.73 m^2 Final    Anion Gap 09/04/2020 13  8 - 16 mmol/L Final     Assessment:       1. Muscle cramps    2. Essential hypertension        Plan:   Pressure mildly elevated.  Will continue current medications for now.  Monitor blood pressure at home daily return 1 month.  Tdap given today.      Muscle cramps  -     Magnesium; Future; Expected date: 09/09/2020    Essential hypertension

## 2020-09-10 DIAGNOSIS — I10 ESSENTIAL HYPERTENSION: Chronic | ICD-10-CM

## 2020-09-10 LAB — MAGNESIUM SERPL-MCNC: 1.8 MG/DL (ref 1.6–2.6)

## 2020-09-10 RX ORDER — METOPROLOL SUCCINATE 25 MG/1
25 TABLET, EXTENDED RELEASE ORAL DAILY
Qty: 90 TABLET | Refills: 3 | Status: SHIPPED | OUTPATIENT
Start: 2020-09-10 | End: 2021-11-22

## 2020-09-23 RX ORDER — TEMAZEPAM 30 MG/1
CAPSULE ORAL
Qty: 30 CAPSULE | Refills: 2 | Status: SHIPPED | OUTPATIENT
Start: 2020-09-23 | End: 2020-10-08 | Stop reason: SDUPTHER

## 2020-09-23 NOTE — TELEPHONE ENCOUNTER
----- Message from Tana Foote sent at 9/23/2020 12:42 PM CDT -----  Contact: NADIA LIANG [[id number]]  Type:  RX Refill Request    Who Called:  Nadia Bunch or New Rx: refill  RX Name and Strength: temazepam (RESTORIL) 30 mg capsule   How is the patient currently taking it? (ex. 1XDay):  TAKE 1 CAPSULE BY MOUTH EVERYDAY AT BEDTIME  Is this a 30 day or 90 day RX: 30 day  Preferred Pharmacy with phone number: [facility]/pharmacy #[id number] - [place], [place] - [address] [address] AT [facility] [phone] (Phone)  [phone] (Fax)  Local or Mail Order: local   Ordering Provider:    Would the patient rather a call back or a response via [facility]?  Call back   Best Call Back Number: [phone]  Additional Information:  none

## 2020-10-06 RX ORDER — FUROSEMIDE 20 MG/1
TABLET ORAL
Qty: 45 TABLET | Refills: 7 | Status: SHIPPED | OUTPATIENT
Start: 2020-10-06 | End: 2020-10-07 | Stop reason: SDUPTHER

## 2020-10-07 RX ORDER — FUROSEMIDE 20 MG/1
TABLET ORAL
Qty: 45 TABLET | Refills: 7 | Status: SHIPPED | OUTPATIENT
Start: 2020-10-07 | End: 2021-05-05

## 2020-10-07 NOTE — TELEPHONE ENCOUNTER
----- Message from Yesika Isaac sent at 10/7/2020  2:36 PM CDT -----  Regarding: Medication  Contact: Patient  .Type:  RX Refill Request    Who Called:  Patient  Refill or New Rx: Refill  RX Name and Strength: furosemide (LASIX) 20 MG tablet  How is the patient currently taking it? (ex. 1XDay):  Is this a 30 day or 90 day RX:  Preferred Pharmacy with phone number: .    [facility]/pharmacy #[id number] - [place], [place] - [address] [address] [address]  Phone: [phone] Fax: [phone]      Local or Mail Order: Local  Ordering Provider: Dr Crawford  Would the patient rather a call back or a response via [facility]? call  Best Call Back Number: .[phone] (home)     Additional Information:

## 2020-10-08 ENCOUNTER — IMMUNIZATION (OUTPATIENT)
Dept: PHARMACY | Facility: CLINIC | Age: 65
End: 2020-10-08
Payer: MEDICARE

## 2020-10-08 ENCOUNTER — OFFICE VISIT (OUTPATIENT)
Dept: INTERNAL MEDICINE | Facility: CLINIC | Age: 65
End: 2020-10-08
Payer: MEDICARE

## 2020-10-08 VITALS
RESPIRATION RATE: 18 BRPM | DIASTOLIC BLOOD PRESSURE: 80 MMHG | SYSTOLIC BLOOD PRESSURE: 136 MMHG | TEMPERATURE: 97 F | WEIGHT: 167.75 LBS | HEIGHT: 62 IN | OXYGEN SATURATION: 99 % | BODY MASS INDEX: 30.87 KG/M2 | HEART RATE: 97 BPM

## 2020-10-08 DIAGNOSIS — N18.30 STAGE 3 CHRONIC KIDNEY DISEASE, UNSPECIFIED WHETHER STAGE 3A OR 3B CKD: ICD-10-CM

## 2020-10-08 DIAGNOSIS — Z94.1 HEART TRANSPLANTED: ICD-10-CM

## 2020-10-08 DIAGNOSIS — M79.7 FIBROMYALGIA: ICD-10-CM

## 2020-10-08 DIAGNOSIS — Z28.39 IMMUNIZATION DEFICIENCY: ICD-10-CM

## 2020-10-08 DIAGNOSIS — I10 ESSENTIAL HYPERTENSION: Primary | ICD-10-CM

## 2020-10-08 DIAGNOSIS — F51.01 PRIMARY INSOMNIA: ICD-10-CM

## 2020-10-08 PROCEDURE — 1101F PR PT FALLS ASSESS DOC 0-1 FALLS W/OUT INJ PAST YR: ICD-10-PCS | Mod: HCNC,CPTII,S$GLB, | Performed by: FAMILY MEDICINE

## 2020-10-08 PROCEDURE — 99999 PR PBB SHADOW E&M-EST. PATIENT-LVL V: CPT | Mod: PBBFAC,HCNC,, | Performed by: FAMILY MEDICINE

## 2020-10-08 PROCEDURE — G0008 ADMIN INFLUENZA VIRUS VAC: HCPCS | Mod: HCNC,S$GLB,, | Performed by: FAMILY MEDICINE

## 2020-10-08 PROCEDURE — 3008F PR BODY MASS INDEX (BMI) DOCUMENTED: ICD-10-PCS | Mod: HCNC,CPTII,S$GLB, | Performed by: FAMILY MEDICINE

## 2020-10-08 PROCEDURE — 3079F PR MOST RECENT DIASTOLIC BLOOD PRESSURE 80-89 MM HG: ICD-10-PCS | Mod: HCNC,CPTII,S$GLB, | Performed by: FAMILY MEDICINE

## 2020-10-08 PROCEDURE — 3008F BODY MASS INDEX DOCD: CPT | Mod: HCNC,CPTII,S$GLB, | Performed by: FAMILY MEDICINE

## 2020-10-08 PROCEDURE — 1126F AMNT PAIN NOTED NONE PRSNT: CPT | Mod: HCNC,S$GLB,, | Performed by: FAMILY MEDICINE

## 2020-10-08 PROCEDURE — G0008 FLU VACCINE - QUADRIVALENT - ADJUVANTED: ICD-10-PCS | Mod: HCNC,S$GLB,, | Performed by: FAMILY MEDICINE

## 2020-10-08 PROCEDURE — 3075F SYST BP GE 130 - 139MM HG: CPT | Mod: HCNC,CPTII,S$GLB, | Performed by: FAMILY MEDICINE

## 2020-10-08 PROCEDURE — 3079F DIAST BP 80-89 MM HG: CPT | Mod: HCNC,CPTII,S$GLB, | Performed by: FAMILY MEDICINE

## 2020-10-08 PROCEDURE — 1159F PR MEDICATION LIST DOCUMENTED IN MEDICAL RECORD: ICD-10-PCS | Mod: HCNC,S$GLB,, | Performed by: FAMILY MEDICINE

## 2020-10-08 PROCEDURE — 90694 FLU VACCINE - QUADRIVALENT - ADJUVANTED: ICD-10-PCS | Mod: HCNC,S$GLB,, | Performed by: FAMILY MEDICINE

## 2020-10-08 PROCEDURE — 1101F PT FALLS ASSESS-DOCD LE1/YR: CPT | Mod: HCNC,CPTII,S$GLB, | Performed by: FAMILY MEDICINE

## 2020-10-08 PROCEDURE — 1159F MED LIST DOCD IN RCRD: CPT | Mod: HCNC,S$GLB,, | Performed by: FAMILY MEDICINE

## 2020-10-08 PROCEDURE — 99214 OFFICE O/P EST MOD 30 MIN: CPT | Mod: HCNC,25,S$GLB, | Performed by: FAMILY MEDICINE

## 2020-10-08 PROCEDURE — 90694 VACC AIIV4 NO PRSRV 0.5ML IM: CPT | Mod: HCNC,S$GLB,, | Performed by: FAMILY MEDICINE

## 2020-10-08 PROCEDURE — 99999 PR PBB SHADOW E&M-EST. PATIENT-LVL V: ICD-10-PCS | Mod: PBBFAC,HCNC,, | Performed by: FAMILY MEDICINE

## 2020-10-08 PROCEDURE — 3075F PR MOST RECENT SYSTOLIC BLOOD PRESS GE 130-139MM HG: ICD-10-PCS | Mod: HCNC,CPTII,S$GLB, | Performed by: FAMILY MEDICINE

## 2020-10-08 PROCEDURE — 1126F PR PAIN SEVERITY QUANTIFIED, NO PAIN PRESENT: ICD-10-PCS | Mod: HCNC,S$GLB,, | Performed by: FAMILY MEDICINE

## 2020-10-08 PROCEDURE — 99214 PR OFFICE/OUTPT VISIT, EST, LEVL IV, 30-39 MIN: ICD-10-PCS | Mod: HCNC,25,S$GLB, | Performed by: FAMILY MEDICINE

## 2020-10-08 RX ORDER — PREGABALIN 50 MG/1
50 CAPSULE ORAL 2 TIMES DAILY
Qty: 60 CAPSULE | Refills: 5 | Status: SHIPPED | OUTPATIENT
Start: 2020-10-08 | End: 2021-04-12 | Stop reason: SDUPTHER

## 2020-10-08 RX ORDER — ZOLPIDEM TARTRATE 10 MG/1
TABLET ORAL
Qty: 90 TABLET | Refills: 1 | Status: SHIPPED | OUTPATIENT
Start: 2020-10-22 | End: 2021-05-11 | Stop reason: SDUPTHER

## 2020-10-08 RX ORDER — TEMAZEPAM 30 MG/1
CAPSULE ORAL
Qty: 90 CAPSULE | Refills: 1 | Status: SHIPPED | OUTPATIENT
Start: 2020-10-22 | End: 2020-12-28

## 2020-10-08 NOTE — PROGRESS NOTES
Subjective:       Patient ID: Nadia Damon is a 65 y.o. female.    Chief Complaint: Follow-up (BP)    Follow-up hypertension elevated blood pressure.  Medical history includes chronic kidney disease insomnia anxiety esophageal reflux status post heart transplant.  Her blood pressure at home is improved.  She denies headache chest pain palpitations shortness of breath or edema.  She has chronic insomnia treated with Ambien 10 in Restoril 30.  She also needs refill on Lyrica.  Current medications include losartan Apresoline metoprolol and clonidine.    Review of Systems   Constitutional: Negative for appetite change, chills, diaphoresis, fatigue and fever.   Respiratory: Negative for cough, chest tightness, shortness of breath and wheezing.    Cardiovascular: Negative for chest pain, palpitations and leg swelling.        Denies lightheadedness   Gastrointestinal: Negative for abdominal distention, abdominal pain, constipation, diarrhea and nausea.   Neurological: Negative for dizziness, weakness, light-headedness and headaches.   Psychiatric/Behavioral: Positive for sleep disturbance. The patient is nervous/anxious.        Objective:      Physical Exam  Constitutional:       General: She is not in acute distress.     Appearance: Normal appearance. She is not diaphoretic.   Cardiovascular:      Rate and Rhythm: Normal rate and regular rhythm.      Heart sounds: No friction rub. No gallop.    Pulmonary:      Effort: Pulmonary effort is normal. No respiratory distress.      Breath sounds: No wheezing, rhonchi or rales.   Skin:     General: Skin is warm and dry.      Coloration: Skin is not pale.      Findings: No erythema.   Neurological:      Mental Status: She is alert.   Psychiatric:         Mood and Affect: Mood normal.         Behavior: Behavior normal.         Thought Content: Thought content normal.         Judgment: Judgment normal.         Lab Visit on 09/09/2020   Component Date Value Ref Range Status     Magnesium 09/09/2020 1.8  1.6 - 2.6 mg/dL Final     Assessment:       No diagnosis found.    Plan:     Pressure 118/80.  Continue current medications.  Refill Ambien Restoril Lyrica.  Follow-up in 3 months.  Flu VAX given today.  She will receive to 2nd Shingrix vaccination today as well.    There are no diagnoses linked to this encounter.

## 2020-10-21 ENCOUNTER — TELEPHONE (OUTPATIENT)
Dept: RADIOLOGY | Facility: HOSPITAL | Age: 65
End: 2020-10-21

## 2020-10-22 ENCOUNTER — HOSPITAL ENCOUNTER (OUTPATIENT)
Dept: RADIOLOGY | Facility: HOSPITAL | Age: 65
Discharge: HOME | End: 2020-10-22
Attending: INTERNAL MEDICINE
Payer: MEDICARE

## 2020-10-22 DIAGNOSIS — N04.9 NEPHROTIC SYNDROME: ICD-10-CM

## 2020-10-22 DIAGNOSIS — D84.9 IMMUNOSUPPRESSION: ICD-10-CM

## 2020-10-22 DIAGNOSIS — I10 ESSENTIAL HYPERTENSION: ICD-10-CM

## 2020-10-22 PROCEDURE — 76770 US RETROPERITONEAL COMPLETE: ICD-10-PCS | Mod: 26,HCNC,, | Performed by: RADIOLOGY

## 2020-10-22 PROCEDURE — 76770 US EXAM ABDO BACK WALL COMP: CPT | Mod: TC,HCNC

## 2020-10-22 PROCEDURE — 76770 US EXAM ABDO BACK WALL COMP: CPT | Mod: 26,HCNC,, | Performed by: RADIOLOGY

## 2020-10-29 ENCOUNTER — OFFICE VISIT (OUTPATIENT)
Dept: NEPHROLOGY | Facility: CLINIC | Age: 65
End: 2020-10-29
Payer: MEDICARE

## 2020-10-29 VITALS
SYSTOLIC BLOOD PRESSURE: 150 MMHG | BODY MASS INDEX: 31.45 KG/M2 | DIASTOLIC BLOOD PRESSURE: 80 MMHG | WEIGHT: 170.88 LBS | HEART RATE: 70 BPM | HEIGHT: 62 IN

## 2020-10-29 DIAGNOSIS — N04.9 NEPHROTIC SYNDROME: Primary | ICD-10-CM

## 2020-10-29 PROCEDURE — 99215 PR OFFICE/OUTPT VISIT, EST, LEVL V, 40-54 MIN: ICD-10-PCS | Mod: HCNC,S$GLB,, | Performed by: INTERNAL MEDICINE

## 2020-10-29 PROCEDURE — 1101F PR PT FALLS ASSESS DOC 0-1 FALLS W/OUT INJ PAST YR: ICD-10-PCS | Mod: HCNC,CPTII,S$GLB, | Performed by: INTERNAL MEDICINE

## 2020-10-29 PROCEDURE — 1125F PR PAIN SEVERITY QUANTIFIED, PAIN PRESENT: ICD-10-PCS | Mod: HCNC,S$GLB,, | Performed by: INTERNAL MEDICINE

## 2020-10-29 PROCEDURE — 99215 OFFICE O/P EST HI 40 MIN: CPT | Mod: HCNC,S$GLB,, | Performed by: INTERNAL MEDICINE

## 2020-10-29 PROCEDURE — 3079F DIAST BP 80-89 MM HG: CPT | Mod: HCNC,CPTII,S$GLB, | Performed by: INTERNAL MEDICINE

## 2020-10-29 PROCEDURE — 1159F MED LIST DOCD IN RCRD: CPT | Mod: HCNC,S$GLB,, | Performed by: INTERNAL MEDICINE

## 2020-10-29 PROCEDURE — 3008F PR BODY MASS INDEX (BMI) DOCUMENTED: ICD-10-PCS | Mod: HCNC,CPTII,S$GLB, | Performed by: INTERNAL MEDICINE

## 2020-10-29 PROCEDURE — 1101F PT FALLS ASSESS-DOCD LE1/YR: CPT | Mod: HCNC,CPTII,S$GLB, | Performed by: INTERNAL MEDICINE

## 2020-10-29 PROCEDURE — 3077F SYST BP >= 140 MM HG: CPT | Mod: HCNC,CPTII,S$GLB, | Performed by: INTERNAL MEDICINE

## 2020-10-29 PROCEDURE — 99999 PR PBB SHADOW E&M-EST. PATIENT-LVL V: ICD-10-PCS | Mod: PBBFAC,HCNC,, | Performed by: INTERNAL MEDICINE

## 2020-10-29 PROCEDURE — 3077F PR MOST RECENT SYSTOLIC BLOOD PRESSURE >= 140 MM HG: ICD-10-PCS | Mod: HCNC,CPTII,S$GLB, | Performed by: INTERNAL MEDICINE

## 2020-10-29 PROCEDURE — 99999 PR PBB SHADOW E&M-EST. PATIENT-LVL V: CPT | Mod: PBBFAC,HCNC,, | Performed by: INTERNAL MEDICINE

## 2020-10-29 PROCEDURE — 1125F AMNT PAIN NOTED PAIN PRSNT: CPT | Mod: HCNC,S$GLB,, | Performed by: INTERNAL MEDICINE

## 2020-10-29 PROCEDURE — 3079F PR MOST RECENT DIASTOLIC BLOOD PRESSURE 80-89 MM HG: ICD-10-PCS | Mod: HCNC,CPTII,S$GLB, | Performed by: INTERNAL MEDICINE

## 2020-10-29 PROCEDURE — 3008F BODY MASS INDEX DOCD: CPT | Mod: HCNC,CPTII,S$GLB, | Performed by: INTERNAL MEDICINE

## 2020-10-29 PROCEDURE — 1159F PR MEDICATION LIST DOCUMENTED IN MEDICAL RECORD: ICD-10-PCS | Mod: HCNC,S$GLB,, | Performed by: INTERNAL MEDICINE

## 2020-10-29 RX ORDER — AMLODIPINE BESYLATE 2.5 MG/1
2.5 TABLET ORAL DAILY
Qty: 30 TABLET | Refills: 11 | Status: SHIPPED | OUTPATIENT
Start: 2020-10-29 | End: 2021-05-11 | Stop reason: SDUPTHER

## 2020-10-29 NOTE — PROGRESS NOTES
NEPHROLOGY CLINIC FOLLOWUP NOTE  Date of clinic visit: 10/29/20     REASON FOR FOLLOWUP AND CHIEF COMPLAINT: nephrotic syndrome, CKD post heart transplant, HTN, nephrolithiasis, hypercalcemia     HISTORY OF PRESENT ILLNESS: Ms. Damon is a 65-year-old female with a history of CKD stage 3, nephrolithiasis (likely mixed stones), and heart transplant in 1993 (is on cyclosporine), who presents for f/u. Pt was last seen by us about 6 weeks ago. Pt has showed up sooner for BP mgmt (uncontrolled BP). Chart was reviewed. She has slowly worsening s Cr and had 6 g of proteinuria. 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 Losartan and lasix have been added. Clonidine was reduced as pt had dry mouth.    On f/u today,, pt reports right flank pain, worse with moving, no change in urination, no recent kidney stones, no fever, no abd pain, no nausea. Feels does not sleep comfortably.     To review: Pt has had mild, persistent hypercalcemia and increase in iPTH. Primary hyperparathyroidism (HPT) is suspected. Sestamibi nuclear scan of the neck did not show any adenomas. She is on a very low dose of PO lasix (10) mg to control s Ca.      On f/u today, pt has no acute or new c/o's. She denies eating salty foods. She is compliant with all the meds. Pt reports no new kidney stones. No discomfort today. HCTZ was previosuly stopped already due to low BP and risk of hypercalcemia. Her risk factors for kidney stones include primary hyperparathyroidism (HPT) and being on cyclosporine for prevention of heart transplant rejection, which causes hyperuricemia. Pt has a h/o of osteopenia.     In addition, she has chronically mild low serum albumin. She has no volume overload/CHF, or liver disease. A review of the records showed that s alb has been low at least for 9 years. Noted that she also has mild proteinuria. She has no leg swelling or SOB.        To review:   Pt previously developed right sided back  pain, thought to be related to kidney stones identified on renal u/s. The stones in the R kidney were non-obstructive but were relatively large at 10 and 12 mm each. Based on h/o of taking cyclosporine to prevent heart transplant rejection and eating a lot of sea food (shrimp and crawfish), uric acid stones were suspected. Pt had additional risk factors for non-uric acid stones, including taking Vit C 1 g/day and Vit D. After dietary modification, the flank pain resolved. A f/u CT scan did not show any stones. It is possible that she passed the 2 stones, as uric acid stones are soluble on urinary alkalinization and can pass by themselves. Pt was advised to stop Vit C and Vit D, which she has. She was also advised on drinking freshly squeezed lemon juice and follow dietary recommendations to avoid nephrolithiasis risk factors. Pt was referred also to urology. A repeat CT scan did not show any stones. Pt obtained a 24 hour urine collection for kidney stone risk stratification which showed mild hypocitraturia.         PAST MEDICAL HISTORY:  1. CKD stage III. Nephrotic syndrome. 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 Hypertension.  3. Heart transplant for cardiomyopathy in 1993, the patient has been on chronic  cyclosporine treatment.  4. Carpal tunnel syndrome.  5. Fibromyalgia.  6. GERD.  7. Bell's palsy.  8. Depression.     PAST SURGICAL HISTORY: Reviewed as above, unchanged.     MEDICATIONS: Reviewed    Current Outpatient Medications:     acetaminophen (TYLENOL ORAL), Take by mouth., Disp: , Rfl:     acetaminophen 500 mg/15 mL Liqd, , Disp: , Rfl:     aspirin (ECOTRIN) 81 MG EC tablet, Take 1 tablet (81 mg total) by mouth once daily., Disp: 30 tablet, Rfl: 11    baclofen (LIORESAL) 10 MG tablet, TAKE 1 TABLET THREE TIMES DAILY AS NEEDED FOR MUSCLE SPASMS, Disp: 270 tablet, Rfl: 0    biotin 10,000 mcg Cap, Take 1 tablet by mouth once daily.,  Disp: , Rfl:     busPIRone (BUSPAR) 10 MG tablet, TAKE 1 TABLET EVERY DAY, Disp: 90 tablet, Rfl: 1    cloNIDine (CATAPRES) 0.1 MG tablet, Take 1 tablet (0.1 mg total) by mouth every evening., Disp: 90 tablet, Rfl: 3    cycloSPORINE modified, NEORAL, (NEORAL) 100 MG capsule, TAKE 1 CAPSULE EVERY DAY, Disp: 90 capsule, Rfl: 0    cycloSPORINE modified, NEORAL, (NEORAL) 25 MG capsule, TAKE 3 CAPSULES EVERY DAY, Disp: 270 capsule, Rfl: 0    DULoxetine (CYMBALTA) 30 MG capsule, TAKE 1 CAPSULE EVERY DAY, Disp: 90 capsule, Rfl: 1    EVENING PRIMROSE OIL ORAL, Take 1,000 mg by mouth once daily., Disp: , Rfl:     ferrous sulfate (FEOSOL) 325 mg (65 mg iron) Tab tablet, Take 1 tablet (325 mg total) by mouth once daily., Disp: 100 tablet, Rfl: 3    furosemide (LASIX) 20 MG tablet, TAKE 0.5 TABLET BY MOUTH DAILY, Disp: 45 tablet, Rfl: 7    hydrALAZINE (APRESOLINE) 50 MG tablet, Take 2 tablets (100 mg total) by mouth every 8 (eight) hours., Disp: 90 tablet, Rfl: 11    leg brace (ANKLE SUPPORT MISC), , Disp: , Rfl:     losartan (COZAAR) 25 MG tablet, Take 1 tablet (25 mg total) by mouth once daily., Disp: 90 tablet, Rfl: 3    metoprolol succinate (TOPROL-XL) 25 MG 24 hr tablet, Take 1 tablet (25 mg total) by mouth once daily., Disp: 90 tablet, Rfl: 3    multivitamin capsule, Take 1 capsule by mouth once daily., Disp: , Rfl:     omeprazole (PRILOSEC) 10 MG capsule, TAKE 1 CAPSULE EVERY DAY, Disp: 90 capsule, Rfl: 0    pregabalin (LYRICA) 50 MG capsule, Take 1 capsule (50 mg total) by mouth 2 (two) times daily., Disp: 60 capsule, Rfl: 5    temazepam (RESTORIL) 30 mg capsule, TAKE 1 CAPSULE BY MOUTH EVERYDAY AT BEDTIME, Disp: 90 capsule, Rfl: 1    vitamin E 400 UNIT capsule, Take 400 Units by mouth once daily., Disp: , Rfl:     zolpidem (AMBIEN) 10 mg Tab, TAKE 1 TABLET BY MOUTH ONCE DAILY IN THE EVENING, Disp: 90 tablet, Rfl: 1    amLODIPine (NORVASC) 2.5 MG tablet, Take 1 tablet (2.5 mg total) by mouth once  daily., Disp: 30 tablet, Rfl: 11    fluticasone (FLONASE) 50 mcg/actuation nasal spray, 2 sprays by Each Nare route once daily., Disp: 1 Bottle, Rfl: 0    mometasone (ELOCON) 0.1 % lotion, Apply topically once daily., Disp: 180 mL, Rfl: 3    Current Facility-Administered Medications:     denosumab (PROLIA) injection 60 mg, 60 mg, Subcutaneous, Q6 Months, Prasanth Johnson MD    triamcinolone acetonide injection 10 mg, 10 mg, Intradermal, 1 time in Clinic/HOD, Jose Roland MD      SOCIAL HISTORY: Reviewed. Negative for smoking. No alcohol use.      REVIEW OF SYSTEMS: No recent hospitalizations.  GENERAL: Negative.  HEAD, EYES, EARS, NOSE, AND THROAT: Negative.  CARDIAC: Negative.  PULMONARY: Negative.  GASTROINTESTINAL: Negative.  GENITOURINARY: Negative.  PSYCHOLOGICAL: Negative.  NEUROLOGICAL: Negative.  ENDOCRINE: Negative.  HEMATOLOGIC AND ONCOLOGIC: Negative.  INFECTIOUS DISEASE: Negative.  The rest of the review of systems negative.     PHYSICAL EXAMINATION:  VITAL SIGNS: Repeat blood pressure is 150/80, repeat 140/92, Pulse is 70, weight is 77.5 Kg, from 76.5 Kg  GENERAL: She is cooperative, pleasant, in no acute distress.   Speech and thought process appropriate, normal.  HEENT: Mucous membranes moist.  NECK: Neck JVD. Normal hearing.  ABDOMEN: Soft, nontender.  EXTREMITIES: trace pitting edema.     LABS: Reviewed.   BMP  Lab Results   Component Value Date     10/22/2020    K 4.5 10/22/2020     10/22/2020    CO2 31 (H) 10/22/2020    BUN 37 (H) 10/22/2020    CREATININE 2.5 (H) 10/22/2020    CALCIUM 9.3 10/22/2020    ANIONGAP 10 10/22/2020    ESTGFRAFRICA 22.4 (A) 10/22/2020    EGFRNONAA 19.4 (A) 10/22/2020     Lab Results   Component Value Date    WBC 3.68 (L) 10/22/2020    HGB 10.1 (L) 10/22/2020    HCT 32.7 (L) 10/22/2020    MCV 91 10/22/2020     10/22/2020     Lab Results   Component Value Date    .0 (H) 01/10/2020    CALCIUM 9.3 10/22/2020    CAION 1.13 09/05/2018    PHOS  4.7 (H) 10/22/2020     Urine protein/cr 2.2 g, from  g  U/a: 3+ protein, no blood, 4 RBC's, no casts    Complements C3 and C4 both normal     To review older labs:   24 hour urine: Ca not measured, uric acid 138, Oxalate 20, citrate 203  U/a unremarkable  Urine Cx: neg   Urine P/Cr 740 mg     KUB: unremarkable, except for some constipation     Renal u/s: FINDINGS: 10/22/20  Kidneys measure 10.3 and 9.7 cm in length on the right left respectively.  Cortex is smoothly marginated well maintained with mild diffuse increased echotexture.  Appearance suggest medical renal disease.  Two simple cyst identified within the right kidney.  Upper pole cyst 1.4 cm maximum diameter and inferior pole cyst 2.2 cm maximum diameter.  There is 9 mm simple cyst within the left inferior pole.  Resistive indices are 0.66 and 0.76 on the right left respectively.  Urinary bladder partially distended without focal or diffuse wall thickening.      CT abd: The left kidney appears slightly small.  Right kidney is grossly normal in size.  No obvious hydronephrosis or nephrolithiasis     Sestamibi nuclear scan of the neck: 8/29/19: no adenomas              ASSESSMENT AND PLAN: This is a 65-year-old female who presents for follow up of nephrotic syndrome and slowly progressive CKD:  Patient has a h/o of heart transplant  The impression is as follows:     1. Renal: s Cr not significantly worse, has always fluctuated in this pt, however the general trends appears to be slow worsening  Nephrotic syndrome: good response to losartan: proteinuria improved from 6 to 2.2 g  BP now better controlled after adding losartan and diuretics  Normal C3 and C4 complements  DDX: calcineurin inhibitor nephrotoxicity vs HTN  Renal u/s reviewed: slightly small kidneys with loss of cortex  Will benefit from a kidney biopsy, but pt understands bx mostly diagnostic value    Discussed with pt, explained, pt agreeable: will arrange kidney bx for next week  Stop ASA 1 week  before bx     2. HTN: BP better controlled, still not at goal. Accelerated HTN. Due to nephrotic syndrome?  Reviewed meds with pt  Will add amlodipine 2.5 mg po qd    3. Nephrolithiasis: no new stones. The right sided pain is not due to a new kidney stone, no new stones on recent u/s from 1 week ago  Has multiple risk factors for kidney stones: (hyperuricemia from cyclosporine, diet rich in uric acid, previously taking Vit C, vit D, and calcium,, CKD, and having primary hyperparathyroidism)  Stones are likely mixed ca oxalate and uric acid kidney stones  No longer taking Vit C  No longer takes Vit D and Ca.   Mild hyperuricemia, from cyclosporine.  Mild persistent hypercalcemia (corrected ca for low albumin is slightly elevated)  F/u CT did not show any stones  U/s showed non-obstructive 2 R stones, relatively large  May have passed the stones  24 hour urine for kidney stone stratification shows hypocitraturia  Pt was advised NOT TO STOP cyclosporine.     4. History of heart transplant on cyclosporine  Per heart transplant team.  Do NOT stop cyclosporine      5. Elevated iPTH, likely has primary hyperparathyroidism (HPT)  No adenomas found on sestamibi nulcear scan  May have diffuse hyperplasia  Primary HPT can explain increased risk of kidney stones     6. Leukopenia noted: mild.  Likely due to CSA.  Advised pt to follow up with heart transplant clinic [facility]    7. Right sided back pain: likely musculoskeletal, worsens with movement  U/s showed no new kidney stones         PLANS AND RECOMMENDATIONS:   As discussed above  Opportunity for questions provided  Complicated case, multiple issues addressed   Risks and benefits of kidney biopsy were explained to the pt. Risks include pain, discomfort, bleeding, loss of the kidney biopsied. Benefits include possible finding of a dx and ability to treat medical condition. Pt verbalized understanding.  RTC 2 months  Total time spent 40 minutes. Extensive time spent  including the time to review the records, the patient evaluation, documentation, face-to-face  discussion with the patient. More than 50% of the time was spent in counseling  and coordination of care. Level V visit.     Carter Crawford MD

## 2020-11-09 ENCOUNTER — TELEPHONE (OUTPATIENT)
Dept: RADIOLOGY | Facility: HOSPITAL | Age: 65
End: 2020-11-09

## 2020-11-09 DIAGNOSIS — N04.9 NEPHROTIC SYNDROME: Primary | ICD-10-CM

## 2020-11-09 DIAGNOSIS — D84.9 IMMUNOSUPPRESSION: ICD-10-CM

## 2020-11-09 NOTE — TELEPHONE ENCOUNTER
Interventional Radiology:  Called patient and confirmed appt for kidney bx tomorrow AM, pt to be here, at the hospital on [address], at 0745, with a .  Plan to be here a good 5 hrs, nothing to eat/drink after midnight tonight except AM meds with a small sip of water.  Patient has been off ASA and denies other blood thinner use.

## 2020-11-10 ENCOUNTER — HOSPITAL ENCOUNTER (OUTPATIENT)
Dept: RADIOLOGY | Facility: HOSPITAL | Age: 65
Discharge: HOME | End: 2020-11-10
Attending: INTERNAL MEDICINE
Payer: MEDICARE

## 2020-11-10 VITALS
SYSTOLIC BLOOD PRESSURE: 126 MMHG | WEIGHT: 170 LBS | DIASTOLIC BLOOD PRESSURE: 75 MMHG | HEART RATE: 85 BPM | OXYGEN SATURATION: 100 % | HEIGHT: 62 IN | BODY MASS INDEX: 31.28 KG/M2 | RESPIRATION RATE: 14 BRPM

## 2020-11-10 DIAGNOSIS — N04.9 NEPHROTIC SYNDROME: ICD-10-CM

## 2020-11-10 PROCEDURE — 25000003 PHARM REV CODE 250: Mod: HCNC | Performed by: RADIOLOGY

## 2020-11-10 PROCEDURE — 63600175 PHARM REV CODE 636 W HCPCS: Mod: HCNC | Performed by: RADIOLOGY

## 2020-11-10 PROCEDURE — 77012 CT SCAN FOR NEEDLE BIOPSY: CPT | Mod: TC,HCNC

## 2020-11-10 RX ORDER — FENTANYL CITRATE 50 UG/ML
INJECTION, SOLUTION INTRAMUSCULAR; INTRAVENOUS CODE/TRAUMA/SEDATION MEDICATION
Status: COMPLETED | OUTPATIENT
Start: 2020-11-10 | End: 2020-11-10

## 2020-11-10 RX ORDER — MIDAZOLAM HYDROCHLORIDE 1 MG/ML
INJECTION INTRAMUSCULAR; INTRAVENOUS CODE/TRAUMA/SEDATION MEDICATION
Status: COMPLETED | OUTPATIENT
Start: 2020-11-10 | End: 2020-11-10

## 2020-11-10 RX ORDER — ONDANSETRON 2 MG/ML
4 INJECTION INTRAMUSCULAR; INTRAVENOUS ONCE
Status: COMPLETED | OUTPATIENT
Start: 2020-11-10 | End: 2020-11-10

## 2020-11-10 RX ADMIN — GELATIN ABSORBABLE SPONGE SIZE 100 1 APPLICATOR: MISC at 09:11

## 2020-11-10 RX ADMIN — MIDAZOLAM HYDROCHLORIDE 0.5 MG: 1 INJECTION, SOLUTION INTRAMUSCULAR; INTRAVENOUS at 09:11

## 2020-11-10 RX ADMIN — ONDANSETRON 4 MG: 2 INJECTION, SOLUTION INTRAMUSCULAR; INTRAVENOUS at 08:11

## 2020-11-10 RX ADMIN — FENTANYL CITRATE 25 MCG: 50 INJECTION, SOLUTION INTRAMUSCULAR; INTRAVENOUS at 09:11

## 2020-11-10 NOTE — DISCHARGE INSTRUCTIONS
Discharge Instructions for Kidney Biopsy  You had a procedure called a kidney biopsy. Your healthcare provider used a special needle to remove a small piece of tissue from your kidney to examine it for signs of damage and disease. A kidney biopsy is ordered after other tests have shown that there may be a problem with your kidney. Kidney biopsies are also performed when kidney disease is suspected and to rule out cancer.  Home care  · Rest for 24 hours to 48 hours. Get up only to use the bathroom.  · Dont drive for 24 hours to 48 hours after the procedure.  · Dont shower for 24 hours after the biopsy. If you wish, you may wash yourself with a sponge or washcloth. When you are able to shower, dont scrub the site. Gently wash the area and pat it dry.  · Remove the bandage covering the biopsy site 24 hours to 48 hours after the procedure.  · Dont lift anything heavier than 10 pounds for 3 days to 4 days after the procedure.  · Ask your healthcare provider when you can return to work. Be sure to tell your healthcare provider if your job involves heavy lifting.  · If you normally take blood thinner medicines (anticoagulants or antiplatelet medicines) and you stopped taking them a few days before your procedure, ask your healthcare provider when to start taking them again.  When to seek medical care  Call your healthcare provider right away if you have any of the following:  · Blood in your urine  · Exhaustion or extreme weakness  · Dizziness or lightheadedness  · Sudden or increased shortness of breath  · Sudden chest pain  · Fever of 100.4°F (38°C) or higher, or chills  · Increasing redness, tenderness, or swelling at the biopsy site  · Opening of or drainage or bleeding from the biopsy site  · Increasing pain, with or without activity   Date Last Reviewed: 2/1/2017  © 6671-0729 [employer]. [address] [place] [address], [place] [address]. All rights reserved. This information is not intended as a  substitute for professional medical care. Always follow your healthcare professional's instructions.        Recovery After Procedural Sedation (Adult)   You have been given medicine by vein to make you sleep during your surgery. This may have included both a pain medicine and sleeping medicine. Most of the effects have worn off. But you may still have some drowsiness for the next 6 to 8 hours.  Home care  Follow these guidelines when you get home:  · For the next 8 hours, you should be watched by a responsible adult. This person should make sure your condition is not getting worse.  · Don't drink any alcohol for the next 24 hours.  · Don't drive, operate dangerous machinery, or make important business or personal decisions during the next 24 hours.  · To prevent injury or falls, use caution when standing and walking for at least 24 hours after your procedure.  Note: Your healthcare provider may tell you not to take any medicine by mouth for pain or sleep in the next 4 hours. These medicines may react with the medicines you were given in the hospital. This could cause a much stronger response than usual.  Follow-up care  Follow up with your healthcare provider if you are not alert and back to your usual level of activity within 12 hours.  When to seek medical advice  Call your healthcare provider right away if any of these occur:  · Drowsiness gets worse  · Weakness or dizziness gets worse  · Repeated vomiting  · You can't be awakened  · Fever  · New rash  Teresa last reviewed this educational content on 9/1/2019  © 1661-6523 [employer]. [address] [place] [address], [place] [address]. All rights reserved. This information is not intended as a substitute for professional medical care. Always follow your healthcare professional's instructions.

## 2020-11-10 NOTE — PLAN OF CARE
Pt d/c home in stable condition via wheelchair with friend.  Verbalized understanding of d/c instructions,handout given.  Pt voiced no complaints at this time. Encouraged to f/up with ordering provider.

## 2020-11-10 NOTE — SEDATION DOCUMENTATION
Pt placed on CT table at this time prone with arms above her head. CM in place, VSS, NADN, and pt verbalizes understanding of procedure.

## 2020-11-10 NOTE — SEDATION DOCUMENTATION
Rescan completed at this time. Pt transferred to stretcher and transported back to recovery area. Will continue to monitor pt.

## 2020-11-10 NOTE — SEDATION DOCUMENTATION
Procedure completed at this time. VSS, NADN, and pt tolerated procedure well. Band aid to left flank puncture site C/D/I with no bleeding noted to site. Waiting on a rescan at this time.

## 2020-11-10 NOTE — DISCHARGE SUMMARY
Pre Op Diagnosis: CKD     Post Op Diagnosis: same     Procedure:  Renal core biopsy     Procedure performed by: Wendy MONTERROSO, Carlos MARTINO     Written Informed Consent Obtained: Yes     Specimen Removed:  yes     Estimated Blood Loss:  minimal     Findings: Local anesthesia and moderate sedation were used.     The patient tolerated the procedure well and there were no complications.      Sterile technique was performed in the left flank, lidocaine was used as a local anesthetic.  Multiple samples taken from the left renal cortex.  Pt tolerated the procedure well without immediate complications.  Please see radiologist report for details. F/u with PCP and/or ordering physician.

## 2020-11-16 ENCOUNTER — TELEPHONE (OUTPATIENT)
Dept: NEPHROLOGY | Facility: CLINIC | Age: 65
End: 2020-11-16

## 2020-11-16 NOTE — TELEPHONE ENCOUNTER
----- Message from Qiana Abbott sent at 11/16/2020  3:31 PM CST -----  Regarding: RESULTS  Type:  Test Results    Who Called: PT  Name of Test (Lab/Mammo/Etc): CT BIOPSY  Date of Test: 11/10  Ordering Provider: SHERLEY  Where the test was performed: [facility]  Would the patient rather a call back or a response via [facility]? YES  Best Call Back Number: CALL [facility]  Additional Information:  [phone]

## 2020-11-17 ENCOUNTER — TELEPHONE (OUTPATIENT)
Dept: INTERNAL MEDICINE | Facility: CLINIC | Age: 65
End: 2020-11-17

## 2020-11-17 ENCOUNTER — PATIENT OUTREACH (OUTPATIENT)
Dept: ADMINISTRATIVE | Facility: HOSPITAL | Age: 65
End: 2020-11-17

## 2020-11-17 NOTE — PROGRESS NOTES
HTN Report. Patient contacted to obtain an updated home BP reading. Patient took her BP while on the phone and it was 109/77.  Remote BP will be entered.

## 2020-11-20 LAB — FINAL PATHOLOGIC DIAGNOSIS: NORMAL

## 2020-11-23 ENCOUNTER — TELEPHONE (OUTPATIENT)
Dept: NEPHROLOGY | Facility: CLINIC | Age: 65
End: 2020-11-23

## 2020-11-25 DIAGNOSIS — Z94.1 HEART TRANSPLANTED: Chronic | ICD-10-CM

## 2020-11-25 RX ORDER — CYCLOSPORINE 25 MG/1
75 CAPSULE, LIQUID FILLED ORAL 2 TIMES DAILY
Qty: 540 CAPSULE | Refills: 3 | OUTPATIENT
Start: 2020-11-25 | End: 2021-01-23

## 2020-11-25 NOTE — TELEPHONE ENCOUNTER
Spoke to pt who was clling to give us the results of her kidney biopsy. She does have damage secondary to 27 years of CNI's and high blood pressures. We talked about the importance of close f/u as her GFR is 22% and avoiding NSAIDS, hydration, nutrition and being active as well as monitoring her BP's. Her next lab is 12/30/20 for Nephrology.     Decision was made to decrease her cya dose to 75 mg twice daily from 100 in am and 75 in pm. Her last level was 158 which is above the range of 100-150. Set up lab at [facility] for Dec. 1  Hopefully, this will help preserve her renal function.

## 2020-11-25 NOTE — PROGRESS NOTES
Renal note:  Reviewed results of the kidney biopsy with the pt on the phone. Biopsy on 11/12/20 showed renal damage by HTN (hypertensive nephrosclerosis) as well as calcineurin inhibitor toxicity due to taking cyclosporine to prevent heart transplant rejection. There was 50% chronic interstitial fibrosis.    Pt was advised to continue to take cyclosporine (do NOT stop)  Control /80  Avoid salty foods  Avoid both dehydration and overhydration  Avoid all NSAIds.    Carter Crawford MD

## 2020-12-01 ENCOUNTER — LAB VISIT (OUTPATIENT)
Dept: LAB | Facility: HOSPITAL | Age: 65
End: 2020-12-01
Attending: FAMILY MEDICINE
Payer: MEDICARE

## 2020-12-01 DIAGNOSIS — R06.02 SHORTNESS OF BREATH: ICD-10-CM

## 2020-12-01 DIAGNOSIS — T86.20 COMPLICATION OF HEART TRANSPLANT, UNSPECIFIED COMPLICATION: ICD-10-CM

## 2020-12-01 DIAGNOSIS — Z79.52 LONG TERM CURRENT USE OF SYSTEMIC STEROIDS: ICD-10-CM

## 2020-12-01 DIAGNOSIS — E87.5 HYPERKALEMIA: ICD-10-CM

## 2020-12-01 DIAGNOSIS — Z79.899 ENCOUNTER FOR LONG-TERM (CURRENT) USE OF MEDICATIONS: ICD-10-CM

## 2020-12-01 DIAGNOSIS — Z79.60 LONG-TERM USE OF IMMUNOSUPPRESSANT MEDICATION: ICD-10-CM

## 2020-12-01 DIAGNOSIS — Z94.1 STATUS POST HEART TRANSPLANT: ICD-10-CM

## 2020-12-01 LAB
ANION GAP SERPL CALC-SCNC: 12 MMOL/L (ref 8–16)
BASOPHILS # BLD AUTO: 0.01 K/UL (ref 0–0.2)
BASOPHILS NFR BLD: 0.3 % (ref 0–1.9)
BNP SERPL-MCNC: 197 PG/ML (ref 0–99)
BUN SERPL-MCNC: 35 MG/DL (ref 8–23)
CALCIUM SERPL-MCNC: 9 MG/DL (ref 8.7–10.5)
CHLORIDE SERPL-SCNC: 107 MMOL/L (ref 95–110)
CHOLEST SERPL-MCNC: 181 MG/DL (ref 120–199)
CHOLEST/HDLC SERPL: 3.8 {RATIO} (ref 2–5)
CO2 SERPL-SCNC: 27 MMOL/L (ref 23–29)
CREAT SERPL-MCNC: 2.4 MG/DL (ref 0.5–1.4)
DIFFERENTIAL METHOD: ABNORMAL
EOSINOPHIL # BLD AUTO: 0.1 K/UL (ref 0–0.5)
EOSINOPHIL NFR BLD: 3.4 % (ref 0–8)
ERYTHROCYTE [DISTWIDTH] IN BLOOD BY AUTOMATED COUNT: 14.4 % (ref 11.5–14.5)
EST. GFR  (AFRICAN AMERICAN): 23.5 ML/MIN/1.73 M^2
EST. GFR  (NON AFRICAN AMERICAN): 20.4 ML/MIN/1.73 M^2
GLUCOSE SERPL-MCNC: 94 MG/DL (ref 70–110)
HCT VFR BLD AUTO: 30.3 % (ref 37–48.5)
HDLC SERPL-MCNC: 48 MG/DL (ref 40–75)
HDLC SERPL: 26.5 % (ref 20–50)
HGB BLD-MCNC: 9.2 G/DL (ref 12–16)
IMM GRANULOCYTES # BLD AUTO: 0.03 K/UL (ref 0–0.04)
IMM GRANULOCYTES NFR BLD AUTO: 0.8 % (ref 0–0.5)
LDLC SERPL CALC-MCNC: 105.2 MG/DL (ref 63–159)
LYMPHOCYTES # BLD AUTO: 1.1 K/UL (ref 1–4.8)
LYMPHOCYTES NFR BLD: 28.9 % (ref 18–48)
MCH RBC QN AUTO: 28 PG (ref 27–31)
MCHC RBC AUTO-ENTMCNC: 30.4 G/DL (ref 32–36)
MCV RBC AUTO: 92 FL (ref 82–98)
MONOCYTES # BLD AUTO: 0.5 K/UL (ref 0.3–1)
MONOCYTES NFR BLD: 13 % (ref 4–15)
NEUTROPHILS # BLD AUTO: 2.1 K/UL (ref 1.8–7.7)
NEUTROPHILS NFR BLD: 53.6 % (ref 38–73)
NONHDLC SERPL-MCNC: 133 MG/DL
NRBC BLD-RTO: 0 /100 WBC
PLATELET # BLD AUTO: 256 K/UL (ref 150–350)
PMV BLD AUTO: 10.5 FL (ref 9.2–12.9)
POTASSIUM SERPL-SCNC: 4.6 MMOL/L (ref 3.5–5.1)
RBC # BLD AUTO: 3.28 M/UL (ref 4–5.4)
SODIUM SERPL-SCNC: 146 MMOL/L (ref 136–145)
TRIGL SERPL-MCNC: 139 MG/DL (ref 30–150)
WBC # BLD AUTO: 3.84 K/UL (ref 3.9–12.7)

## 2020-12-01 PROCEDURE — 36415 COLL VENOUS BLD VENIPUNCTURE: CPT | Mod: HCNC

## 2020-12-01 PROCEDURE — 85025 COMPLETE CBC W/AUTO DIFF WBC: CPT | Mod: HCNC

## 2020-12-01 PROCEDURE — 80158 DRUG ASSAY CYCLOSPORINE: CPT | Mod: HCNC

## 2020-12-01 PROCEDURE — 80048 BASIC METABOLIC PNL TOTAL CA: CPT | Mod: HCNC

## 2020-12-01 PROCEDURE — 80061 LIPID PANEL: CPT | Mod: HCNC

## 2020-12-01 PROCEDURE — 83880 ASSAY OF NATRIURETIC PEPTIDE: CPT | Mod: HCNC

## 2020-12-02 LAB — CYCLOSPORINE BLD LC/MS/MS-MCNC: 130 NG/ML (ref 100–400)

## 2020-12-03 ENCOUNTER — TELEPHONE (OUTPATIENT)
Dept: TRANSPLANT | Facility: CLINIC | Age: 65
End: 2020-12-03

## 2020-12-03 NOTE — TELEPHONE ENCOUNTER
Spoke with pt this morning and reviewed her CYA and will repeat on 12/30/20 With other labs scheduled that day.

## 2020-12-28 RX ORDER — TEMAZEPAM 30 MG/1
CAPSULE ORAL
Qty: 30 CAPSULE | Refills: 2 | Status: SHIPPED | OUTPATIENT
Start: 2020-12-28 | End: 2021-03-25

## 2020-12-28 NOTE — TELEPHONE ENCOUNTER
----- Message from Dorita Kinsey sent at 12/28/2020  9:17 AM CST -----  Contact: Nadia  Type:  RX Refill Request    Who Called: Nadia   Refill or New Rx: refill   RX Name and Strength: temazepam (RESTORIL) 30 mg capsule  How is the patient currently taking it? (ex. 1XDay): 1Xday   Is this a 30 day or 90 day RX: 90 day   Preferred Pharmacy with phone number:   [facility]/pharmacy #[id number] - [place] - [address] [address] [place] [address]  Phone: [phone] Fax: [phone]  Local or Mail Order: local   Ordering Provider: Dr. Cassidy   Would the patient rather a call back or a response via [facility]? Call   Best Call Back Number: [phone]  Additional Information: pt stated she is currently out of her medication    Thanks,  Pb

## 2020-12-28 NOTE — TELEPHONE ENCOUNTER
----- Message from Jaja Marina sent at 12/28/2020  2:39 PM CST -----  Pt would like return call regarding status of refill request; pt states she is out of Temazepam and is needing refill today. Please call back at [phone]. Md Evonne

## 2020-12-28 NOTE — TELEPHONE ENCOUNTER
----- Message from Dorita Kinsey sent at 12/28/2020  9:17 AM CST -----  Contact: Nadia  Type:  RX Refill Request    Who Called: Nadia   Refill or New Rx: refill   RX Name and Strength: temazepam (RESTORIL) 30 mg capsule  How is the patient currently taking it? (ex. 1XDay): 1Xday   Is this a 30 day or 90 day RX: 90 day   Preferred Pharmacy with phone number:   [facility]/pharmacy #[id number] - [place], [place] - [address] [address] [place] [address]  Phone: [phone] Fax: [phone]  Local or Mail Order: local   Ordering Provider: Dr. Cassidy   Would the patient rather a call back or a response via [facility]? Call   Best Call Back Number: [phone]  Additional Information: pt stated she is currently out of her medication    Thanks,  Pb

## 2020-12-29 ENCOUNTER — PATIENT OUTREACH (OUTPATIENT)
Dept: ADMINISTRATIVE | Facility: OTHER | Age: 65
End: 2020-12-29

## 2020-12-29 ENCOUNTER — TELEPHONE (OUTPATIENT)
Dept: RHEUMATOLOGY | Facility: CLINIC | Age: 65
End: 2020-12-29

## 2020-12-29 NOTE — PROGRESS NOTES
Health Maintenance Due   Topic Date Due    Colorectal Cancer Screening  07/16/2004     Updates were requested from care everywhere.  Chart was reviewed for overdue Proactive [facility] Encounters ([facility]) topics (CRS, Breast Cancer Screening, Eye exam)  Health Maintenance has been updated.

## 2020-12-30 ENCOUNTER — INFUSION (OUTPATIENT)
Dept: INFUSION THERAPY | Facility: HOSPITAL | Age: 65
End: 2020-12-30
Attending: FAMILY MEDICINE
Payer: MEDICARE

## 2020-12-30 ENCOUNTER — OFFICE VISIT (OUTPATIENT)
Dept: RHEUMATOLOGY | Facility: CLINIC | Age: 65
End: 2020-12-30
Payer: MEDICARE

## 2020-12-30 ENCOUNTER — LAB VISIT (OUTPATIENT)
Dept: LAB | Facility: HOSPITAL | Age: 65
End: 2020-12-30
Attending: FAMILY MEDICINE
Payer: MEDICARE

## 2020-12-30 VITALS
BODY MASS INDEX: 31.13 KG/M2 | DIASTOLIC BLOOD PRESSURE: 97 MMHG | SYSTOLIC BLOOD PRESSURE: 152 MMHG | HEART RATE: 93 BPM | WEIGHT: 170.19 LBS

## 2020-12-30 VITALS
DIASTOLIC BLOOD PRESSURE: 86 MMHG | RESPIRATION RATE: 18 BRPM | SYSTOLIC BLOOD PRESSURE: 137 MMHG | HEART RATE: 94 BPM | OXYGEN SATURATION: 98 % | TEMPERATURE: 98 F

## 2020-12-30 DIAGNOSIS — Z79.899 ENCOUNTER FOR LONG-TERM (CURRENT) USE OF MEDICATIONS: ICD-10-CM

## 2020-12-30 DIAGNOSIS — Z94.1 STATUS POST HEART TRANSPLANT: ICD-10-CM

## 2020-12-30 DIAGNOSIS — M81.8 OTHER OSTEOPOROSIS WITHOUT CURRENT PATHOLOGICAL FRACTURE: Primary | ICD-10-CM

## 2020-12-30 DIAGNOSIS — M15.9 PRIMARY OSTEOARTHRITIS INVOLVING MULTIPLE JOINTS: ICD-10-CM

## 2020-12-30 DIAGNOSIS — R06.02 SHORTNESS OF BREATH: ICD-10-CM

## 2020-12-30 DIAGNOSIS — Z71.89 COUNSELING ON HEALTH PROMOTION AND DISEASE PREVENTION: ICD-10-CM

## 2020-12-30 DIAGNOSIS — T86.20 COMPLICATION OF HEART TRANSPLANT, UNSPECIFIED COMPLICATION: ICD-10-CM

## 2020-12-30 DIAGNOSIS — Z79.52 LONG TERM CURRENT USE OF SYSTEMIC STEROIDS: ICD-10-CM

## 2020-12-30 DIAGNOSIS — M81.8 OTHER OSTEOPOROSIS WITHOUT CURRENT PATHOLOGICAL FRACTURE: ICD-10-CM

## 2020-12-30 LAB
ALBUMIN SERPL BCP-MCNC: 3.3 G/DL (ref 3.5–5.2)
ALP SERPL-CCNC: 119 U/L (ref 55–135)
ALT SERPL W/O P-5'-P-CCNC: 27 U/L (ref 10–44)
ANION GAP SERPL CALC-SCNC: 11 MMOL/L (ref 8–16)
AST SERPL-CCNC: 45 U/L (ref 10–40)
BILIRUB SERPL-MCNC: 0.5 MG/DL (ref 0.1–1)
BUN SERPL-MCNC: 34 MG/DL (ref 8–23)
CALCIUM SERPL-MCNC: 9.3 MG/DL (ref 8.7–10.5)
CHLORIDE SERPL-SCNC: 107 MMOL/L (ref 95–110)
CO2 SERPL-SCNC: 27 MMOL/L (ref 23–29)
CREAT SERPL-MCNC: 2.2 MG/DL (ref 0.5–1.4)
EST. GFR  (AFRICAN AMERICAN): 26 ML/MIN/1.73 M^2
EST. GFR  (NON AFRICAN AMERICAN): 23 ML/MIN/1.73 M^2
GLUCOSE SERPL-MCNC: 101 MG/DL (ref 70–110)
POTASSIUM SERPL-SCNC: 4.9 MMOL/L (ref 3.5–5.1)
PROT SERPL-MCNC: 8.3 G/DL (ref 6–8.4)
SODIUM SERPL-SCNC: 145 MMOL/L (ref 136–145)

## 2020-12-30 PROCEDURE — 3288F PR FALLS RISK ASSESSMENT DOCUMENTED: ICD-10-PCS | Mod: HCNC,CPTII,S$GLB, | Performed by: INTERNAL MEDICINE

## 2020-12-30 PROCEDURE — 1101F PT FALLS ASSESS-DOCD LE1/YR: CPT | Mod: HCNC,CPTII,S$GLB, | Performed by: INTERNAL MEDICINE

## 2020-12-30 PROCEDURE — 20610 LARGE JOINT ASPIRATION/INJECTION: L GLENOHUMERAL: ICD-10-PCS | Mod: HCNC,LT,S$GLB, | Performed by: INTERNAL MEDICINE

## 2020-12-30 PROCEDURE — 1125F AMNT PAIN NOTED PAIN PRSNT: CPT | Mod: HCNC,S$GLB,, | Performed by: INTERNAL MEDICINE

## 2020-12-30 PROCEDURE — 80158 DRUG ASSAY CYCLOSPORINE: CPT | Mod: HCNC

## 2020-12-30 PROCEDURE — 80053 COMPREHEN METABOLIC PANEL: CPT | Mod: HCNC

## 2020-12-30 PROCEDURE — 3008F PR BODY MASS INDEX (BMI) DOCUMENTED: ICD-10-PCS | Mod: HCNC,CPTII,S$GLB, | Performed by: INTERNAL MEDICINE

## 2020-12-30 PROCEDURE — 20610 DRAIN/INJ JOINT/BURSA W/O US: CPT | Mod: HCNC,LT,S$GLB, | Performed by: INTERNAL MEDICINE

## 2020-12-30 PROCEDURE — 3288F FALL RISK ASSESSMENT DOCD: CPT | Mod: HCNC,CPTII,S$GLB, | Performed by: INTERNAL MEDICINE

## 2020-12-30 PROCEDURE — 1159F PR MEDICATION LIST DOCUMENTED IN MEDICAL RECORD: ICD-10-PCS | Mod: HCNC,S$GLB,, | Performed by: INTERNAL MEDICINE

## 2020-12-30 PROCEDURE — 1125F PR PAIN SEVERITY QUANTIFIED, PAIN PRESENT: ICD-10-PCS | Mod: HCNC,S$GLB,, | Performed by: INTERNAL MEDICINE

## 2020-12-30 PROCEDURE — 3080F PR MOST RECENT DIASTOLIC BLOOD PRESSURE >= 90 MM HG: ICD-10-PCS | Mod: HCNC,CPTII,S$GLB, | Performed by: INTERNAL MEDICINE

## 2020-12-30 PROCEDURE — 3077F SYST BP >= 140 MM HG: CPT | Mod: HCNC,CPTII,S$GLB, | Performed by: INTERNAL MEDICINE

## 2020-12-30 PROCEDURE — 99214 OFFICE O/P EST MOD 30 MIN: CPT | Mod: HCNC,25,S$GLB, | Performed by: INTERNAL MEDICINE

## 2020-12-30 PROCEDURE — 96372 THER/PROPH/DIAG INJ SC/IM: CPT | Mod: HCNC

## 2020-12-30 PROCEDURE — 3077F PR MOST RECENT SYSTOLIC BLOOD PRESSURE >= 140 MM HG: ICD-10-PCS | Mod: HCNC,CPTII,S$GLB, | Performed by: INTERNAL MEDICINE

## 2020-12-30 PROCEDURE — 3080F DIAST BP >= 90 MM HG: CPT | Mod: HCNC,CPTII,S$GLB, | Performed by: INTERNAL MEDICINE

## 2020-12-30 PROCEDURE — 1159F MED LIST DOCD IN RCRD: CPT | Mod: HCNC,S$GLB,, | Performed by: INTERNAL MEDICINE

## 2020-12-30 PROCEDURE — 1101F PR PT FALLS ASSESS DOC 0-1 FALLS W/OUT INJ PAST YR: ICD-10-PCS | Mod: HCNC,CPTII,S$GLB, | Performed by: INTERNAL MEDICINE

## 2020-12-30 PROCEDURE — 99999 PR PBB SHADOW E&M-EST. PATIENT-LVL V: ICD-10-PCS | Mod: PBBFAC,HCNC,, | Performed by: INTERNAL MEDICINE

## 2020-12-30 PROCEDURE — 63600175 PHARM REV CODE 636 W HCPCS: Mod: JG,HCNC | Performed by: INTERNAL MEDICINE

## 2020-12-30 PROCEDURE — 99999 PR PBB SHADOW E&M-EST. PATIENT-LVL V: CPT | Mod: PBBFAC,HCNC,, | Performed by: INTERNAL MEDICINE

## 2020-12-30 PROCEDURE — 99214 PR OFFICE/OUTPT VISIT, EST, LEVL IV, 30-39 MIN: ICD-10-PCS | Mod: HCNC,25,S$GLB, | Performed by: INTERNAL MEDICINE

## 2020-12-30 PROCEDURE — 3008F BODY MASS INDEX DOCD: CPT | Mod: HCNC,CPTII,S$GLB, | Performed by: INTERNAL MEDICINE

## 2020-12-30 PROCEDURE — 36415 COLL VENOUS BLD VENIPUNCTURE: CPT | Mod: HCNC

## 2020-12-30 RX ORDER — TRIAMCINOLONE ACETONIDE 40 MG/ML
40 INJECTION, SUSPENSION INTRA-ARTICULAR; INTRAMUSCULAR
Status: DISCONTINUED | OUTPATIENT
Start: 2020-12-30 | End: 2020-12-30 | Stop reason: HOSPADM

## 2020-12-30 RX ADMIN — TRIAMCINOLONE ACETONIDE 40 MG: 40 INJECTION, SUSPENSION INTRA-ARTICULAR; INTRAMUSCULAR at 01:12

## 2020-12-30 RX ADMIN — DENOSUMAB 60 MG: 60 INJECTION SUBCUTANEOUS at 03:12

## 2020-12-30 NOTE — PROGRESS NOTES
RHEUMATOLOGY OUTPATIENT CLINIC NOTE    The patient location is: [place]  The chief complaint leading to consultation is:  Osteoarthritis and osteoporosis  Visit type: audio only, patient unable to initiate video conference call  Total time spent with patient: 15 mins  Each patient to whom he or she provides medical services by telemedicine is:  (1) informed of the relationship between the physician and patient and the respective role of any other health care provider with respect to management of the patient; and (2) notified that he or she may decline to receive medical services by telemedicine and may withdraw from such care at any time.    12/30/2020    Attending Rheumatologist: Prasanth Johnson  Primary Care Provider: Richie Cassidy MD   Physician Requesting Consultation: No referring provider defined for this encounter.  Chief Complaint/Reason For Consultation:  OA and OP.    Subjective:       HPI  Nadia Damon is a 65 y.o. Black or  female with osteopenia and osteoarthritis comes for follow-up.    Today  Last seen on .  Received Prolia without side effects.  No acute complaints.  Main concern left shoulder and bilateral trochanteric bursa pain.  Worsening without any particular precipitating event.  Worse in the evening, aggravated by lying on affected sides or prolonged range of motion.  Denies association with joint swelling or fever.  Does not have rash,  or GI complaints.  Denies falls or fractures since last visit.  Currently not planned for invasive dental procedures.    Review of Systems   Constitutional: Negative for chills, fever and malaise/fatigue.   Eyes: Negative for pain and redness.   Respiratory: Negative for cough, hemoptysis and shortness of breath.    Cardiovascular: Negative for chest pain and leg swelling.   Gastrointestinal: Negative for abdominal pain, blood in stool and melena.   Genitourinary: Negative for dysuria and hematuria.   Musculoskeletal: Positive for joint  pain. Negative for falls.   Skin: Negative for rash.   Neurological: Negative for tingling and focal weakness.   Psychiatric/Behavioral: Negative for memory loss. The patient does not have insomnia.      Chronic comorbid conditions affecting medical decision making today:  Past Medical History:   Diagnosis Date    Abdominal wall hernia     CT Renal 6/11/2018---Small fat containing superior ventral abdominal wall hernia at the epicardial pacing lead site.    Anxiety     Arthritis     Chronic kidney disease     Chronic midline low back pain without sciatica 6/18/2018    Coronary artery disease     Depression     Fibromyalgia     on Lyrica    Heart failure     native heart cardiomyopathy    Heart transplanted 1993    due to cardiomyopathy    History of hyperparathyroidism; Hyperparathyroidism, secondary renal     Hypertension     Immune disorder     anti rejection meds    Iron deficiency anemia 8/15/2017    Kidney stones     passed per pt    Obesity     Other osteoporosis without current pathological fracture 8/30/2019    Shingles 2003 approx    left leg    Trouble in sleeping     Urinary incontinence      Past Surgical History:   Procedure Laterality Date    BLADDER SURGERY  2015 approx    mesh - Dr Everett then 2nd reconstructive sx Dr Onofre    BREAST SURGERY Left 9/28/15    Bx - benign    BREAST SURGERY Right 12/2015    Bx benign    CARDIAC PACEMAKER REMOVAL Left 06/26/14    Pacer defirillator removed. Put in 1993 aat time of heart transplant    CARPAL TUNNEL RELEASE Left 03/03/15    Dr. Hall    HEART TRANSPLANT  1993    HERNIA REPAIR Right 1971 approx    Inguinal    HYSTERECTOMY  1983    vag hyst /LSO     TOE SURGERY       Family History   Problem Relation Age of Onset    Cancer Mother 38        breast    Breast cancer Mother     Heart disease Maternal Grandmother     Cataracts Cousin     Hypertension Son     Diabetes Neg Hx     Stroke Neg Hx     Kidney disease Neg Hx      Asthma Neg Hx     COPD Neg Hx     Melanoma Neg Hx     Hyperlipidemia Neg Hx      Social History     Substance and Sexual Activity   Alcohol Use No    Alcohol/week: 0.0 standard drinks     Social History     Tobacco Use   Smoking Status Never Smoker   Smokeless Tobacco Never Used     Social History     Substance and Sexual Activity   Drug Use No       Current Outpatient Medications:     acetaminophen (TYLENOL ORAL), Take by mouth., Disp: , Rfl:     acetaminophen 500 mg/15 mL Liqd, , Disp: , Rfl:     amLODIPine (NORVASC) 2.5 MG tablet, Take 1 tablet (2.5 mg total) by mouth once daily., Disp: 30 tablet, Rfl: 11    aspirin (ECOTRIN) 81 MG EC tablet, Take 1 tablet (81 mg total) by mouth once daily., Disp: 30 tablet, Rfl: 11    baclofen (LIORESAL) 10 MG tablet, TAKE 1 TABLET THREE TIMES DAILY AS NEEDED FOR MUSCLE SPASMS, Disp: 270 tablet, Rfl: 0    biotin 10,000 mcg Cap, Take 1 tablet by mouth once daily., Disp: , Rfl:     busPIRone (BUSPAR) 10 MG tablet, TAKE 1 TABLET EVERY DAY, Disp: 90 tablet, Rfl: 1    cloNIDine (CATAPRES) 0.1 MG tablet, Take 1 tablet (0.1 mg total) by mouth every evening., Disp: 90 tablet, Rfl: 3    cycloSPORINE modified, NEORAL, (NEORAL) 25 MG capsule, Take 3 capsules (75 mg total) by mouth 2 (two) times daily., Disp: 540 capsule, Rfl: 3    DULoxetine (CYMBALTA) 30 MG capsule, TAKE 1 CAPSULE EVERY DAY, Disp: 90 capsule, Rfl: 1    EVENING PRIMROSE OIL ORAL, Take 1,000 mg by mouth once daily., Disp: , Rfl:     ferrous sulfate (FEOSOL) 325 mg (65 mg iron) Tab tablet, Take 1 tablet (325 mg total) by mouth once daily., Disp: 100 tablet, Rfl: 3    fluticasone (FLONASE) 50 mcg/actuation nasal spray, 2 sprays by Each Nare route once daily., Disp: 1 Bottle, Rfl: 0    furosemide (LASIX) 20 MG tablet, TAKE 0.5 TABLET BY MOUTH DAILY, Disp: 45 tablet, Rfl: 7    hydrALAZINE (APRESOLINE) 50 MG tablet, Take 2 tablets (100 mg total) by mouth every 8 (eight) hours., Disp: 90 tablet, Rfl: 11     "losartan (COZAAR) 25 MG tablet, Take 1 tablet (25 mg total) by mouth once daily., Disp: 90 tablet, Rfl: 3    metoprolol succinate (TOPROL-XL) 25 MG 24 hr tablet, Take 1 tablet (25 mg total) by mouth once daily., Disp: 90 tablet, Rfl: 3    multivitamin capsule, Take 1 capsule by mouth once daily., Disp: , Rfl:     omeprazole (PRILOSEC) 10 MG capsule, TAKE 1 CAPSULE EVERY DAY, Disp: 90 capsule, Rfl: 0    pregabalin (LYRICA) 50 MG capsule, Take 1 capsule (50 mg total) by mouth 2 (two) times daily., Disp: 60 capsule, Rfl: 5    temazepam (RESTORIL) 30 mg capsule, TAKE 1 CAPSULE BY MOUTH EVERYDAY AT BEDTIME, Disp: 30 capsule, Rfl: 2    vitamin E 400 UNIT capsule, Take 400 Units by mouth once daily., Disp: , Rfl:     zolpidem (AMBIEN) 10 mg Tab, TAKE 1 TABLET BY MOUTH ONCE DAILY IN THE EVENING, Disp: 90 tablet, Rfl: 1    leg brace (ANKLE SUPPORT MISC), , Disp: , Rfl:     mometasone (ELOCON) 0.1 % lotion, Apply topically once daily., Disp: 180 mL, Rfl: 3    Current Facility-Administered Medications:     denosumab (PROLIA) injection 60 mg, 60 mg, Subcutaneous, Q6 Months, Prasanth Johnson MD    triamcinolone acetonide injection 10 mg, 10 mg, Intradermal, 1 time in Clinic/HOD, Jose Roland MD     Objective:         Vitals:    12/30/20 1346   BP: (!) 152/97   Pulse: 93     Physical Exam   Constitutional: No distress.   Estimated body mass index is 31.13 kg/m² as calculated from the following:    Height as of 11/10/20: 5' 2" (1.575 m).    Weight as of this encounter: 77.2 kg (170 lb 3.1 oz).    Wt Readings from Last 1 Encounters:  12/30/20 1346 : 77.2 kg (170 lb 3.1 oz)     HENT:   Head: Normocephalic and atraumatic.   Eyes: Conjunctivae are normal. Pupils are equal, round, and reactive to light.   Neck: Normal range of motion.   Cardiovascular: Normal rate and intact distal pulses.    Pulmonary/Chest: Effort normal. No respiratory distress.   Abdominal: Soft. She exhibits no distension.   Neurological: She is " alert. Gait normal.   Skin: No rash noted. No erythema.     Musculoskeletal: Normal range of motion. Tenderness (Mild tenderness to palpation trochanteric bursa area left more than right.) present.      Comments: : strong  No synovitis or significant squeeze tenderness    AROM:  Decreased range of motion left shoulder past 40° due to pain.  Otherwise  intact  PROM: intact    Rotator cuff maneuvers elicit pain left shoulder.  Arm drop test negative.    Devices used by patient: none       Reviewed old and all outside pertinent medical records available.    All lab results personally reviewed and interpreted by me.  Lab Results   Component Value Date    WBC 3.84 (L) 12/01/2020    HGB 9.2 (L) 12/01/2020    HCT 30.3 (L) 12/01/2020    MCV 92 12/01/2020    MCH 28.0 12/01/2020    MCHC 30.4 (L) 12/01/2020    RDW 14.4 12/01/2020     12/01/2020    MPV 10.5 12/01/2020    PLTEST Normal 08/02/2007       Lab Results   Component Value Date     12/30/2020    K 4.9 12/30/2020     12/30/2020    CO2 27 12/30/2020     12/30/2020    BUN 34 (H) 12/30/2020    CALCIUM 9.3 12/30/2020    PROT 8.3 12/30/2020    ALBUMIN 3.3 (L) 12/30/2020    BILITOT 0.5 12/30/2020    AST 45 (H) 12/30/2020    ALKPHOS 119 12/30/2020    ALT 27 12/30/2020       Lab Results   Component Value Date    COLORU Yellow 10/22/2020    APPEARANCEUA Clear 10/22/2020    SPECGRAV 1.010 10/22/2020    PHUR 7.0 10/22/2020    PROTEINUA 2+ (A) 10/22/2020    KETONESU Negative 10/22/2020    LEUKOCYTESUR Negative 10/22/2020    NITRITE Negative 10/22/2020    UROBILINOGEN Negative 03/25/2020       Lab Results   Component Value Date    CRP 14.2 (H) 03/25/2020       No results found for: SEDRATE, ERYTHROCYTES    No results found for: JONNATHAN, RF, SEDRATE    No components found for: 25OHVITDTOT, 30GJOVUU0, 35YGUWPX6, METHODNOTE    Lab Results   Component Value Date    URICACID 6.5 (H) 05/28/2019       No components found for: TSPOTTB    Rheum Labs:  n/a      Imaging:  All imaging reviewed and independently  interpreted by me.    X-ray wrists June 2014   Intra-articular fracture the distal radius.  Avulsion fracture of the ulnar styloid.    DEXA scan  July 2019  FN: -0.5  LS: -1.1  FRAX: 0.5% hip / 8.4% major     ASSESSMENT / PLAN:     Nadia Damon is a 65 y.o. Black or  female with:    1. Osteoarthritis  - features of rotator cuff tendinopathy and trochanteric bursa syndrome  - will provide local corticosteroid injection today left shoulder.  - continue range of motion, flexibility, strengthening exercises  - physical therapy     2. Osteoporosis  - two isolated episodes of fragility fracture distal radius and ulna likely represent osteoporotic fracture.  - chronic cyclosporin use, osteopenia, hyperparathyroidism, CKD IV  - received Prolia on, due for repeat dose.  Will continue every 6 months  - kidney function stable, no hypocalcemia on last labs.  Okay to receive medication today.  - CMP prior Prolia administration to monitor calcium level  - continue adequate dietary calcium and vitamin D intake.  - Avoid immobility, fall prevention     3. Other specified counseling  - Nutrition and exercise counseling.  - Limitation of alcohol consumption.  - Regular exercise:  Aerobic and resistance.  - Medication counseling provided.    Follow up in about 6 months (around 6/30/2021).    Method of contact with patient concerns: MyChart attn Rheumatology    Disclaimer:  This note is prepared using voice recognition software and as such is likely to have errors and has not been proof read. Please contact me for questions.     Large Joint Aspiration/Injection: L glenohumeral    Date/Time: 12/30/2020 1:00 PM  Performed by: Prasanth Johnson MD  Authorized by: Prasanth Johnson MD     Consent Done?:  Yes (Verbal)  Indications:  Pain  Site marked: the procedure site was marked    Timeout: prior to procedure the correct patient, procedure, and site was verified    Prep:  patient was prepped and draped in usual sterile fashion    Local anesthetic:  Lidocaine 2% without epinephrine    Details:  Needle Size:  25 G  Ultrasonic Guidance for needle placement?: No    Location:  Shoulder  Site:  L glenohumeral  Medications:  40 mg triamcinolone acetonide 40 mg/mL  Patient tolerance:  Patient tolerated the procedure well with no immediate complications      Prasanth Johnson M.D.  Rheumatology Department   [facility] [place]

## 2020-12-31 ENCOUNTER — TELEPHONE (OUTPATIENT)
Dept: TRANSPLANT | Facility: CLINIC | Age: 65
End: 2020-12-31

## 2020-12-31 LAB — CYCLOSPORINE BLD LC/MS/MS-MCNC: 125 NG/ML (ref 100–400)

## 2020-12-31 NOTE — TELEPHONE ENCOUNTER
Spoke with pt this morning and reviewed her cyclosporine level, no changes in medication, pt states that she has been staying safe and no one has covid in her apartment building.    Discussed f/u labs and annual between March and May.

## 2021-01-05 ENCOUNTER — CLINICAL SUPPORT (OUTPATIENT)
Dept: REHABILITATION | Facility: HOSPITAL | Age: 66
End: 2021-01-05
Attending: INTERNAL MEDICINE
Payer: MEDICARE

## 2021-01-05 DIAGNOSIS — R29.898 WEAKNESS OF BOTH LOWER EXTREMITIES: ICD-10-CM

## 2021-01-05 DIAGNOSIS — M15.9 PRIMARY OSTEOARTHRITIS INVOLVING MULTIPLE JOINTS: ICD-10-CM

## 2021-01-05 PROCEDURE — 97110 THERAPEUTIC EXERCISES: CPT | Mod: HCNC

## 2021-01-05 PROCEDURE — 97162 PT EVAL MOD COMPLEX 30 MIN: CPT | Mod: HCNC

## 2021-01-07 ENCOUNTER — OFFICE VISIT (OUTPATIENT)
Dept: INTERNAL MEDICINE | Facility: CLINIC | Age: 66
End: 2021-01-07
Payer: MEDICARE

## 2021-01-07 VITALS
SYSTOLIC BLOOD PRESSURE: 122 MMHG | HEIGHT: 62 IN | BODY MASS INDEX: 30.14 KG/M2 | HEART RATE: 116 BPM | OXYGEN SATURATION: 99 % | DIASTOLIC BLOOD PRESSURE: 84 MMHG | TEMPERATURE: 98 F | WEIGHT: 163.81 LBS

## 2021-01-07 DIAGNOSIS — N18.30 STAGE 3 CHRONIC KIDNEY DISEASE, UNSPECIFIED WHETHER STAGE 3A OR 3B CKD: ICD-10-CM

## 2021-01-07 DIAGNOSIS — Z12.11 COLON CANCER SCREENING: ICD-10-CM

## 2021-01-07 DIAGNOSIS — G47.00 INSOMNIA, UNSPECIFIED TYPE: Primary | ICD-10-CM

## 2021-01-07 DIAGNOSIS — I10 ESSENTIAL HYPERTENSION: ICD-10-CM

## 2021-01-07 DIAGNOSIS — D84.9 IMMUNOCOMPROMISED PATIENT: ICD-10-CM

## 2021-01-07 DIAGNOSIS — I70.0 AORTIC ATHEROSCLEROSIS: ICD-10-CM

## 2021-01-07 DIAGNOSIS — Z94.1 HEART TRANSPLANTED: ICD-10-CM

## 2021-01-07 PROCEDURE — 1126F PR PAIN SEVERITY QUANTIFIED, NO PAIN PRESENT: ICD-10-PCS | Mod: HCNC,S$GLB,, | Performed by: FAMILY MEDICINE

## 2021-01-07 PROCEDURE — 3079F DIAST BP 80-89 MM HG: CPT | Mod: HCNC,CPTII,S$GLB, | Performed by: FAMILY MEDICINE

## 2021-01-07 PROCEDURE — 3079F PR MOST RECENT DIASTOLIC BLOOD PRESSURE 80-89 MM HG: ICD-10-PCS | Mod: HCNC,CPTII,S$GLB, | Performed by: FAMILY MEDICINE

## 2021-01-07 PROCEDURE — 99999 PR PBB SHADOW E&M-EST. PATIENT-LVL V: ICD-10-PCS | Mod: PBBFAC,HCNC,, | Performed by: FAMILY MEDICINE

## 2021-01-07 PROCEDURE — 3008F PR BODY MASS INDEX (BMI) DOCUMENTED: ICD-10-PCS | Mod: HCNC,CPTII,S$GLB, | Performed by: FAMILY MEDICINE

## 2021-01-07 PROCEDURE — 99499 UNLISTED E&M SERVICE: CPT | Mod: S$GLB,,, | Performed by: FAMILY MEDICINE

## 2021-01-07 PROCEDURE — 3288F FALL RISK ASSESSMENT DOCD: CPT | Mod: HCNC,CPTII,S$GLB, | Performed by: FAMILY MEDICINE

## 2021-01-07 PROCEDURE — 99214 OFFICE O/P EST MOD 30 MIN: CPT | Mod: HCNC,S$GLB,, | Performed by: FAMILY MEDICINE

## 2021-01-07 PROCEDURE — 1159F PR MEDICATION LIST DOCUMENTED IN MEDICAL RECORD: ICD-10-PCS | Mod: HCNC,S$GLB,, | Performed by: FAMILY MEDICINE

## 2021-01-07 PROCEDURE — 3008F BODY MASS INDEX DOCD: CPT | Mod: HCNC,CPTII,S$GLB, | Performed by: FAMILY MEDICINE

## 2021-01-07 PROCEDURE — 99499 RISK ADDL DX/OHS AUDIT: ICD-10-PCS | Mod: S$GLB,,, | Performed by: FAMILY MEDICINE

## 2021-01-07 PROCEDURE — 99999 PR PBB SHADOW E&M-EST. PATIENT-LVL V: CPT | Mod: PBBFAC,HCNC,, | Performed by: FAMILY MEDICINE

## 2021-01-07 PROCEDURE — 1101F PR PT FALLS ASSESS DOC 0-1 FALLS W/OUT INJ PAST YR: ICD-10-PCS | Mod: HCNC,CPTII,S$GLB, | Performed by: FAMILY MEDICINE

## 2021-01-07 PROCEDURE — 3288F PR FALLS RISK ASSESSMENT DOCUMENTED: ICD-10-PCS | Mod: HCNC,CPTII,S$GLB, | Performed by: FAMILY MEDICINE

## 2021-01-07 PROCEDURE — 3074F PR MOST RECENT SYSTOLIC BLOOD PRESSURE < 130 MM HG: ICD-10-PCS | Mod: HCNC,CPTII,S$GLB, | Performed by: FAMILY MEDICINE

## 2021-01-07 PROCEDURE — 99214 PR OFFICE/OUTPT VISIT, EST, LEVL IV, 30-39 MIN: ICD-10-PCS | Mod: HCNC,S$GLB,, | Performed by: FAMILY MEDICINE

## 2021-01-07 PROCEDURE — 1101F PT FALLS ASSESS-DOCD LE1/YR: CPT | Mod: HCNC,CPTII,S$GLB, | Performed by: FAMILY MEDICINE

## 2021-01-07 PROCEDURE — 1126F AMNT PAIN NOTED NONE PRSNT: CPT | Mod: HCNC,S$GLB,, | Performed by: FAMILY MEDICINE

## 2021-01-07 PROCEDURE — 1159F MED LIST DOCD IN RCRD: CPT | Mod: HCNC,S$GLB,, | Performed by: FAMILY MEDICINE

## 2021-01-07 PROCEDURE — 3074F SYST BP LT 130 MM HG: CPT | Mod: HCNC,CPTII,S$GLB, | Performed by: FAMILY MEDICINE

## 2021-01-12 ENCOUNTER — CLINICAL SUPPORT (OUTPATIENT)
Dept: REHABILITATION | Facility: HOSPITAL | Age: 66
End: 2021-01-12
Payer: MEDICARE

## 2021-01-12 DIAGNOSIS — R29.898 WEAKNESS OF BOTH LOWER EXTREMITIES: ICD-10-CM

## 2021-01-12 PROCEDURE — 97014 ELECTRIC STIMULATION THERAPY: CPT | Mod: HCNC

## 2021-01-12 PROCEDURE — 97110 THERAPEUTIC EXERCISES: CPT | Mod: HCNC

## 2021-01-14 ENCOUNTER — LAB VISIT (OUTPATIENT)
Dept: LAB | Facility: HOSPITAL | Age: 66
End: 2021-01-14
Attending: INTERNAL MEDICINE
Payer: MEDICARE

## 2021-01-14 DIAGNOSIS — N04.9 NEPHROTIC SYNDROME: ICD-10-CM

## 2021-01-14 LAB
BASOPHILS # BLD AUTO: 0.02 K/UL (ref 0–0.2)
BASOPHILS NFR BLD: 0.4 % (ref 0–1.9)
DIFFERENTIAL METHOD: ABNORMAL
EOSINOPHIL # BLD AUTO: 0.1 K/UL (ref 0–0.5)
EOSINOPHIL NFR BLD: 1.6 % (ref 0–8)
ERYTHROCYTE [DISTWIDTH] IN BLOOD BY AUTOMATED COUNT: 15.5 % (ref 11.5–14.5)
HCT VFR BLD AUTO: 33.4 % (ref 37–48.5)
HGB BLD-MCNC: 10.3 G/DL (ref 12–16)
IMM GRANULOCYTES # BLD AUTO: 0.04 K/UL (ref 0–0.04)
IMM GRANULOCYTES NFR BLD AUTO: 0.8 % (ref 0–0.5)
LYMPHOCYTES # BLD AUTO: 1.6 K/UL (ref 1–4.8)
LYMPHOCYTES NFR BLD: 32.7 % (ref 18–48)
MCH RBC QN AUTO: 29.4 PG (ref 27–31)
MCHC RBC AUTO-ENTMCNC: 30.8 G/DL (ref 32–36)
MCV RBC AUTO: 95 FL (ref 82–98)
MONOCYTES # BLD AUTO: 0.5 K/UL (ref 0.3–1)
MONOCYTES NFR BLD: 10.2 % (ref 4–15)
NEUTROPHILS # BLD AUTO: 2.7 K/UL (ref 1.8–7.7)
NEUTROPHILS NFR BLD: 54.3 % (ref 38–73)
NRBC BLD-RTO: 0 /100 WBC
PLATELET # BLD AUTO: 287 K/UL (ref 150–350)
PMV BLD AUTO: 9.5 FL (ref 9.2–12.9)
RBC # BLD AUTO: 3.5 M/UL (ref 4–5.4)
WBC # BLD AUTO: 4.98 K/UL (ref 3.9–12.7)

## 2021-01-14 PROCEDURE — 85025 COMPLETE CBC W/AUTO DIFF WBC: CPT | Mod: HCNC

## 2021-01-14 PROCEDURE — 80069 RENAL FUNCTION PANEL: CPT | Mod: HCNC

## 2021-01-14 PROCEDURE — 36415 COLL VENOUS BLD VENIPUNCTURE: CPT | Mod: HCNC

## 2021-01-15 ENCOUNTER — CLINICAL SUPPORT (OUTPATIENT)
Dept: REHABILITATION | Facility: HOSPITAL | Age: 66
End: 2021-01-15
Payer: MEDICARE

## 2021-01-15 DIAGNOSIS — R29.898 WEAKNESS OF BOTH LOWER EXTREMITIES: ICD-10-CM

## 2021-01-15 LAB
ALBUMIN SERPL BCP-MCNC: 3.5 G/DL (ref 3.5–5.2)
ALBUMIN SERPL BCP-MCNC: 3.6 G/DL (ref 3.5–5.2)
ALP SERPL-CCNC: 135 U/L (ref 55–135)
ALT SERPL W/O P-5'-P-CCNC: 36 U/L (ref 10–44)
ANION GAP SERPL CALC-SCNC: 9 MMOL/L (ref 8–16)
ANION GAP SERPL CALC-SCNC: 9 MMOL/L (ref 8–16)
AST SERPL-CCNC: 41 U/L (ref 10–40)
BASOPHILS # BLD AUTO: 0.02 K/UL (ref 0–0.2)
BASOPHILS NFR BLD: 0.3 % (ref 0–1.9)
BILIRUB SERPL-MCNC: 0.6 MG/DL (ref 0.1–1)
BUN SERPL-MCNC: 36 MG/DL (ref 8–23)
BUN SERPL-MCNC: 46 MG/DL (ref 8–23)
CALCIUM SERPL-MCNC: 8.1 MG/DL (ref 8.7–10.5)
CALCIUM SERPL-MCNC: 8.2 MG/DL (ref 8.7–10.5)
CHLORIDE SERPL-SCNC: 104 MMOL/L (ref 95–110)
CHLORIDE SERPL-SCNC: 104 MMOL/L (ref 95–110)
CO2 SERPL-SCNC: 24 MMOL/L (ref 23–29)
CO2 SERPL-SCNC: 25 MMOL/L (ref 23–29)
CREAT SERPL-MCNC: 2 MG/DL (ref 0.5–1.4)
CREAT SERPL-MCNC: 2.5 MG/DL (ref 0.5–1.4)
DIFFERENTIAL METHOD: ABNORMAL
EOSINOPHIL # BLD AUTO: 0.1 K/UL (ref 0–0.5)
EOSINOPHIL NFR BLD: 1.1 % (ref 0–8)
ERYTHROCYTE [DISTWIDTH] IN BLOOD BY AUTOMATED COUNT: 14.8 % (ref 11.5–14.5)
EST. GFR  (AFRICAN AMERICAN): 22.4 ML/MIN/1.73 M^2
EST. GFR  (AFRICAN AMERICAN): 29 ML/MIN/1.73 M^2
EST. GFR  (NON AFRICAN AMERICAN): 19.4 ML/MIN/1.73 M^2
EST. GFR  (NON AFRICAN AMERICAN): 25 ML/MIN/1.73 M^2
GLUCOSE SERPL-MCNC: 100 MG/DL (ref 70–110)
GLUCOSE SERPL-MCNC: 76 MG/DL (ref 70–110)
HCT VFR BLD AUTO: 31.9 % (ref 37–48.5)
HGB BLD-MCNC: 10.1 G/DL (ref 12–16)
IMM GRANULOCYTES # BLD AUTO: 0.06 K/UL (ref 0–0.04)
IMM GRANULOCYTES NFR BLD AUTO: 1 % (ref 0–0.5)
LYMPHOCYTES # BLD AUTO: 1.5 K/UL (ref 1–4.8)
LYMPHOCYTES NFR BLD: 23.5 % (ref 18–48)
MAGNESIUM SERPL-MCNC: 2 MG/DL (ref 1.6–2.6)
MCH RBC QN AUTO: 28.6 PG (ref 27–31)
MCHC RBC AUTO-ENTMCNC: 31.7 G/DL (ref 32–36)
MCV RBC AUTO: 90 FL (ref 82–98)
MONOCYTES # BLD AUTO: 0.6 K/UL (ref 0.3–1)
MONOCYTES NFR BLD: 10.3 % (ref 4–15)
NEUTROPHILS # BLD AUTO: 4 K/UL (ref 1.8–7.7)
NEUTROPHILS NFR BLD: 63.8 % (ref 38–73)
NRBC BLD-RTO: 0 /100 WBC
PHOSPHATE SERPL-MCNC: 3.9 MG/DL (ref 2.7–4.5)
PLATELET # BLD AUTO: 257 K/UL (ref 150–350)
PMV BLD AUTO: 8.7 FL (ref 9.2–12.9)
POTASSIUM SERPL-SCNC: 5.5 MMOL/L (ref 3.5–5.1)
POTASSIUM SERPL-SCNC: 6.6 MMOL/L (ref 3.5–5.1)
PROT SERPL-MCNC: 8.2 G/DL (ref 6–8.4)
RBC # BLD AUTO: 3.53 M/UL (ref 4–5.4)
SODIUM SERPL-SCNC: 137 MMOL/L (ref 136–145)
SODIUM SERPL-SCNC: 138 MMOL/L (ref 136–145)
WBC # BLD AUTO: 6.22 K/UL (ref 3.9–12.7)

## 2021-01-15 PROCEDURE — 97110 THERAPEUTIC EXERCISES: CPT | Mod: HCNC

## 2021-01-15 PROCEDURE — 85025 COMPLETE CBC W/AUTO DIFF WBC: CPT | Mod: HCNC

## 2021-01-15 PROCEDURE — 99283 EMERGENCY DEPT VISIT LOW MDM: CPT | Mod: HCNC

## 2021-01-15 PROCEDURE — 83735 ASSAY OF MAGNESIUM: CPT | Mod: HCNC

## 2021-01-15 PROCEDURE — 80053 COMPREHEN METABOLIC PANEL: CPT | Mod: HCNC

## 2021-01-15 PROCEDURE — 36415 COLL VENOUS BLD VENIPUNCTURE: CPT | Mod: HCNC

## 2021-01-16 ENCOUNTER — HOSPITAL ENCOUNTER (EMERGENCY)
Facility: HOSPITAL | Age: 66
Discharge: HOME | End: 2021-01-16
Attending: STUDENT IN AN ORGANIZED HEALTH CARE EDUCATION/TRAINING PROGRAM
Payer: MEDICARE

## 2021-01-16 VITALS
BODY MASS INDEX: 31.38 KG/M2 | RESPIRATION RATE: 19 BRPM | WEIGHT: 170.5 LBS | OXYGEN SATURATION: 98 % | TEMPERATURE: 98 F | HEIGHT: 62 IN | SYSTOLIC BLOOD PRESSURE: 158 MMHG | HEART RATE: 90 BPM | DIASTOLIC BLOOD PRESSURE: 91 MMHG

## 2021-01-16 DIAGNOSIS — E87.5 HYPERKALEMIA: Primary | ICD-10-CM

## 2021-01-16 PROCEDURE — 25000003 PHARM REV CODE 250: Mod: HCNC | Performed by: STUDENT IN AN ORGANIZED HEALTH CARE EDUCATION/TRAINING PROGRAM

## 2021-01-16 RX ADMIN — SODIUM ZIRCONIUM CYCLOSILICATE 10 G: 5 POWDER, FOR SUSPENSION ORAL at 02:01

## 2021-01-19 ENCOUNTER — CLINICAL SUPPORT (OUTPATIENT)
Dept: REHABILITATION | Facility: HOSPITAL | Age: 66
End: 2021-01-19
Payer: MEDICARE

## 2021-01-19 DIAGNOSIS — R29.898 WEAKNESS OF BOTH LOWER EXTREMITIES: ICD-10-CM

## 2021-01-19 PROCEDURE — 97110 THERAPEUTIC EXERCISES: CPT | Mod: HCNC

## 2021-01-26 ENCOUNTER — CLINICAL SUPPORT (OUTPATIENT)
Dept: REHABILITATION | Facility: HOSPITAL | Age: 66
End: 2021-01-26
Payer: MEDICARE

## 2021-01-26 DIAGNOSIS — R29.898 WEAKNESS OF BOTH LOWER EXTREMITIES: ICD-10-CM

## 2021-01-26 PROCEDURE — 97140 MANUAL THERAPY 1/> REGIONS: CPT

## 2021-01-26 PROCEDURE — 97110 THERAPEUTIC EXERCISES: CPT

## 2021-01-26 PROCEDURE — 97014 ELECTRIC STIMULATION THERAPY: CPT

## 2021-02-01 ENCOUNTER — TELEPHONE (OUTPATIENT)
Dept: TRANSPLANT | Facility: CLINIC | Age: 66
End: 2021-02-01

## 2021-02-02 ENCOUNTER — CLINICAL SUPPORT (OUTPATIENT)
Dept: REHABILITATION | Facility: HOSPITAL | Age: 66
End: 2021-02-02
Payer: MEDICARE

## 2021-02-02 ENCOUNTER — LAB VISIT (OUTPATIENT)
Dept: LAB | Facility: HOSPITAL | Age: 66
End: 2021-02-02
Attending: FAMILY MEDICINE
Payer: MEDICARE

## 2021-02-02 DIAGNOSIS — T86.20 COMPLICATION OF HEART TRANSPLANT, UNSPECIFIED COMPLICATION: ICD-10-CM

## 2021-02-02 DIAGNOSIS — Z79.60 LONG-TERM USE OF IMMUNOSUPPRESSANT MEDICATION: ICD-10-CM

## 2021-02-02 DIAGNOSIS — R06.02 SHORTNESS OF BREATH: ICD-10-CM

## 2021-02-02 DIAGNOSIS — E87.5 HYPERKALEMIA: ICD-10-CM

## 2021-02-02 DIAGNOSIS — R29.898 WEAKNESS OF BOTH LOWER EXTREMITIES: ICD-10-CM

## 2021-02-02 DIAGNOSIS — Z79.899 ENCOUNTER FOR LONG-TERM (CURRENT) USE OF MEDICATIONS: ICD-10-CM

## 2021-02-02 DIAGNOSIS — Z79.52 LONG TERM CURRENT USE OF SYSTEMIC STEROIDS: ICD-10-CM

## 2021-02-02 DIAGNOSIS — Z94.1 STATUS POST HEART TRANSPLANT: ICD-10-CM

## 2021-02-02 LAB
BASOPHILS # BLD AUTO: 0.03 K/UL (ref 0–0.2)
BASOPHILS NFR BLD: 0.7 % (ref 0–1.9)
DIFFERENTIAL METHOD: ABNORMAL
EOSINOPHIL # BLD AUTO: 0.1 K/UL (ref 0–0.5)
EOSINOPHIL NFR BLD: 3.3 % (ref 0–8)
ERYTHROCYTE [DISTWIDTH] IN BLOOD BY AUTOMATED COUNT: 15.4 % (ref 11.5–14.5)
HCT VFR BLD AUTO: 32.1 % (ref 37–48.5)
HGB BLD-MCNC: 9.9 G/DL (ref 12–16)
IMM GRANULOCYTES # BLD AUTO: 0.03 K/UL (ref 0–0.04)
IMM GRANULOCYTES NFR BLD AUTO: 0.7 % (ref 0–0.5)
LYMPHOCYTES # BLD AUTO: 1.3 K/UL (ref 1–4.8)
LYMPHOCYTES NFR BLD: 30.2 % (ref 18–48)
MCH RBC QN AUTO: 29 PG (ref 27–31)
MCHC RBC AUTO-ENTMCNC: 30.8 G/DL (ref 32–36)
MCV RBC AUTO: 94 FL (ref 82–98)
MONOCYTES # BLD AUTO: 0.5 K/UL (ref 0.3–1)
MONOCYTES NFR BLD: 11.2 % (ref 4–15)
NEUTROPHILS # BLD AUTO: 2.3 K/UL (ref 1.8–7.7)
NEUTROPHILS NFR BLD: 53.9 % (ref 38–73)
NRBC BLD-RTO: 0 /100 WBC
PLATELET # BLD AUTO: 272 K/UL (ref 150–350)
PMV BLD AUTO: 9.3 FL (ref 9.2–12.9)
RBC # BLD AUTO: 3.41 M/UL (ref 4–5.4)
WBC # BLD AUTO: 4.3 K/UL (ref 3.9–12.7)

## 2021-02-02 PROCEDURE — 80048 BASIC METABOLIC PNL TOTAL CA: CPT

## 2021-02-02 PROCEDURE — 80158 DRUG ASSAY CYCLOSPORINE: CPT

## 2021-02-02 PROCEDURE — 85025 COMPLETE CBC W/AUTO DIFF WBC: CPT

## 2021-02-02 PROCEDURE — 97110 THERAPEUTIC EXERCISES: CPT

## 2021-02-02 PROCEDURE — 36415 COLL VENOUS BLD VENIPUNCTURE: CPT

## 2021-02-02 PROCEDURE — 97140 MANUAL THERAPY 1/> REGIONS: CPT

## 2021-02-03 ENCOUNTER — TELEPHONE (OUTPATIENT)
Dept: TRANSPLANT | Facility: CLINIC | Age: 66
End: 2021-02-03

## 2021-02-03 LAB
ANION GAP SERPL CALC-SCNC: 11 MMOL/L (ref 8–16)
BUN SERPL-MCNC: 33 MG/DL (ref 8–23)
CALCIUM SERPL-MCNC: 7.9 MG/DL (ref 8.7–10.5)
CHLORIDE SERPL-SCNC: 105 MMOL/L (ref 95–110)
CO2 SERPL-SCNC: 24 MMOL/L (ref 23–29)
CREAT SERPL-MCNC: 1.8 MG/DL (ref 0.5–1.4)
CYCLOSPORINE BLD LC/MS/MS-MCNC: 124 NG/ML (ref 100–400)
EST. GFR  (AFRICAN AMERICAN): 33.3 ML/MIN/1.73 M^2
EST. GFR  (NON AFRICAN AMERICAN): 28.9 ML/MIN/1.73 M^2
GLUCOSE SERPL-MCNC: 71 MG/DL (ref 70–110)
POTASSIUM SERPL-SCNC: 5 MMOL/L (ref 3.5–5.1)
SODIUM SERPL-SCNC: 140 MMOL/L (ref 136–145)

## 2021-02-05 ENCOUNTER — CLINICAL SUPPORT (OUTPATIENT)
Dept: REHABILITATION | Facility: HOSPITAL | Age: 66
End: 2021-02-05
Payer: MEDICARE

## 2021-02-05 DIAGNOSIS — R29.898 WEAKNESS OF BOTH LOWER EXTREMITIES: ICD-10-CM

## 2021-02-05 PROCEDURE — 97014 ELECTRIC STIMULATION THERAPY: CPT

## 2021-02-05 PROCEDURE — 97110 THERAPEUTIC EXERCISES: CPT

## 2021-02-08 DIAGNOSIS — F51.01 PRIMARY INSOMNIA: ICD-10-CM

## 2021-02-08 RX ORDER — ZOLPIDEM TARTRATE 10 MG/1
TABLET ORAL
Qty: 90 TABLET | Refills: 1 | OUTPATIENT
Start: 2021-02-08

## 2021-02-09 ENCOUNTER — TELEPHONE (OUTPATIENT)
Dept: INTERNAL MEDICINE | Facility: CLINIC | Age: 66
End: 2021-02-09

## 2021-02-10 DIAGNOSIS — Z94.1 HEART TRANSPLANTED: Chronic | ICD-10-CM

## 2021-02-10 RX ORDER — CYCLOSPORINE 25 MG/1
75 CAPSULE, LIQUID FILLED ORAL 2 TIMES DAILY
Qty: 270 CAPSULE | Refills: 6 | Status: SHIPPED | OUTPATIENT
Start: 2021-02-10 | End: 2021-03-09 | Stop reason: SDUPTHER

## 2021-02-12 ENCOUNTER — CLINICAL SUPPORT (OUTPATIENT)
Dept: REHABILITATION | Facility: HOSPITAL | Age: 66
End: 2021-02-12
Payer: MEDICARE

## 2021-02-12 DIAGNOSIS — R29.898 WEAKNESS OF BOTH LOWER EXTREMITIES: ICD-10-CM

## 2021-02-12 PROCEDURE — 97110 THERAPEUTIC EXERCISES: CPT

## 2021-02-12 PROCEDURE — 97014 ELECTRIC STIMULATION THERAPY: CPT

## 2021-02-12 PROCEDURE — 97140 MANUAL THERAPY 1/> REGIONS: CPT

## 2021-02-18 ENCOUNTER — TELEPHONE (OUTPATIENT)
Dept: REHABILITATION | Facility: HOSPITAL | Age: 66
End: 2021-02-18

## 2021-02-22 ENCOUNTER — LAB VISIT (OUTPATIENT)
Dept: OTOLARYNGOLOGY | Facility: CLINIC | Age: 66
End: 2021-02-22
Payer: MEDICARE

## 2021-02-22 DIAGNOSIS — Z01.818 PRE-OP TESTING: ICD-10-CM

## 2021-02-22 PROCEDURE — U0003 INFECTIOUS AGENT DETECTION BY NUCLEIC ACID (DNA OR RNA); SEVERE ACUTE RESPIRATORY SYNDROME CORONAVIRUS 2 (SARS-COV-2) (CORONAVIRUS DISEASE [COVID-19]), AMPLIFIED PROBE TECHNIQUE, MAKING USE OF HIGH THROUGHPUT TECHNOLOGIES AS DESCRIBED BY CMS-2020-01-R: HCPCS

## 2021-02-22 PROCEDURE — U0005 INFEC AGEN DETEC AMPLI PROBE: HCPCS

## 2021-02-23 ENCOUNTER — TELEPHONE (OUTPATIENT)
Dept: TRANSPLANT | Facility: CLINIC | Age: 66
End: 2021-02-23

## 2021-02-23 ENCOUNTER — TELEPHONE (OUTPATIENT)
Dept: ENDOSCOPY | Facility: HOSPITAL | Age: 66
End: 2021-02-23

## 2021-02-23 ENCOUNTER — CLINICAL SUPPORT (OUTPATIENT)
Dept: REHABILITATION | Facility: HOSPITAL | Age: 66
End: 2021-02-23
Payer: MEDICARE

## 2021-02-23 DIAGNOSIS — R29.898 WEAKNESS OF BOTH LOWER EXTREMITIES: ICD-10-CM

## 2021-02-23 LAB — SARS-COV-2 RNA RESP QL NAA+PROBE: NOT DETECTED

## 2021-02-23 PROCEDURE — 97110 THERAPEUTIC EXERCISES: CPT

## 2021-02-23 RX ORDER — SODIUM, POTASSIUM,MAG SULFATES 17.5-3.13G
1 SOLUTION, RECONSTITUTED, ORAL ORAL DAILY
Qty: 1 KIT | Refills: 0 | Status: ON HOLD | OUTPATIENT
Start: 2021-02-23 | End: 2021-02-25 | Stop reason: HOSPADM

## 2021-02-25 ENCOUNTER — ANESTHESIA (OUTPATIENT)
Dept: ENDOSCOPY | Facility: HOSPITAL | Age: 66
End: 2021-02-25
Payer: MEDICARE

## 2021-02-25 ENCOUNTER — HOSPITAL ENCOUNTER (OUTPATIENT)
Facility: HOSPITAL | Age: 66
Discharge: HOME | End: 2021-02-25
Attending: INTERNAL MEDICINE | Admitting: INTERNAL MEDICINE
Payer: MEDICARE

## 2021-02-25 ENCOUNTER — ANESTHESIA EVENT (OUTPATIENT)
Dept: ENDOSCOPY | Facility: HOSPITAL | Age: 66
End: 2021-02-25
Payer: MEDICARE

## 2021-02-25 VITALS
BODY MASS INDEX: 30.1 KG/M2 | HEART RATE: 92 BPM | HEIGHT: 62 IN | TEMPERATURE: 97 F | WEIGHT: 163.56 LBS | SYSTOLIC BLOOD PRESSURE: 132 MMHG | OXYGEN SATURATION: 97 % | RESPIRATION RATE: 18 BRPM | DIASTOLIC BLOOD PRESSURE: 80 MMHG

## 2021-02-25 DIAGNOSIS — Z12.11 COLON CANCER SCREENING: Primary | ICD-10-CM

## 2021-02-25 PROCEDURE — 88305 TISSUE EXAM BY PATHOLOGIST: ICD-10-PCS | Mod: 26,,, | Performed by: PATHOLOGY

## 2021-02-25 PROCEDURE — 63600175 PHARM REV CODE 636 W HCPCS: Performed by: NURSE ANESTHETIST, CERTIFIED REGISTERED

## 2021-02-25 PROCEDURE — 45380 PR COLONOSCOPY,BIOPSY: ICD-10-PCS | Mod: PT,,, | Performed by: INTERNAL MEDICINE

## 2021-02-25 PROCEDURE — 88305 TISSUE EXAM BY PATHOLOGIST: CPT | Performed by: PATHOLOGY

## 2021-02-25 PROCEDURE — 27201012 HC FORCEPS, HOT/COLD, DISP: Performed by: INTERNAL MEDICINE

## 2021-02-25 PROCEDURE — 25000003 PHARM REV CODE 250: Performed by: NURSE ANESTHETIST, CERTIFIED REGISTERED

## 2021-02-25 PROCEDURE — 45380 COLONOSCOPY AND BIOPSY: CPT | Performed by: INTERNAL MEDICINE

## 2021-02-25 PROCEDURE — 88305 TISSUE EXAM BY PATHOLOGIST: CPT | Mod: 26,,, | Performed by: PATHOLOGY

## 2021-02-25 PROCEDURE — 27200997: Performed by: INTERNAL MEDICINE

## 2021-02-25 PROCEDURE — 37000008 HC ANESTHESIA 1ST 15 MINUTES: Performed by: INTERNAL MEDICINE

## 2021-02-25 PROCEDURE — 45380 COLONOSCOPY AND BIOPSY: CPT | Mod: PT,,, | Performed by: INTERNAL MEDICINE

## 2021-02-25 PROCEDURE — 37000009 HC ANESTHESIA EA ADD 15 MINS: Performed by: INTERNAL MEDICINE

## 2021-02-25 RX ORDER — SODIUM CHLORIDE, SODIUM LACTATE, POTASSIUM CHLORIDE, CALCIUM CHLORIDE 600; 310; 30; 20 MG/100ML; MG/100ML; MG/100ML; MG/100ML
INJECTION, SOLUTION INTRAVENOUS CONTINUOUS
Status: DISCONTINUED | OUTPATIENT
Start: 2021-02-25 | End: 2021-02-25 | Stop reason: HOSPADM

## 2021-02-25 RX ORDER — PROPOFOL 10 MG/ML
VIAL (ML) INTRAVENOUS
Status: DISCONTINUED | OUTPATIENT
Start: 2021-02-25 | End: 2021-02-25

## 2021-02-25 RX ORDER — SODIUM CHLORIDE, SODIUM LACTATE, POTASSIUM CHLORIDE, CALCIUM CHLORIDE 600; 310; 30; 20 MG/100ML; MG/100ML; MG/100ML; MG/100ML
INJECTION, SOLUTION INTRAVENOUS CONTINUOUS PRN
Status: DISCONTINUED | OUTPATIENT
Start: 2021-02-25 | End: 2021-02-25

## 2021-02-25 RX ORDER — LIDOCAINE HYDROCHLORIDE 10 MG/ML
INJECTION, SOLUTION EPIDURAL; INFILTRATION; INTRACAUDAL; PERINEURAL
Status: DISCONTINUED | OUTPATIENT
Start: 2021-02-25 | End: 2021-02-25

## 2021-02-25 RX ADMIN — PROPOFOL 20 MG: 10 INJECTION, EMULSION INTRAVENOUS at 10:02

## 2021-02-25 RX ADMIN — PROPOFOL 70 MG: 10 INJECTION, EMULSION INTRAVENOUS at 10:02

## 2021-02-25 RX ADMIN — SODIUM CHLORIDE, SODIUM LACTATE, POTASSIUM CHLORIDE, AND CALCIUM CHLORIDE: .6; .31; .03; .02 INJECTION, SOLUTION INTRAVENOUS at 10:02

## 2021-02-25 RX ADMIN — PROPOFOL 40 MG: 10 INJECTION, EMULSION INTRAVENOUS at 10:02

## 2021-02-25 RX ADMIN — LIDOCAINE HYDROCHLORIDE 50 MG: 10 INJECTION, SOLUTION EPIDURAL; INFILTRATION; INTRACAUDAL; PERINEURAL at 10:02

## 2021-03-02 LAB
FINAL PATHOLOGIC DIAGNOSIS: NORMAL
GROSS: NORMAL
Lab: NORMAL

## 2021-03-03 ENCOUNTER — TELEPHONE (OUTPATIENT)
Dept: TRANSPLANT | Facility: CLINIC | Age: 66
End: 2021-03-03

## 2021-03-08 DIAGNOSIS — N18.4 CKD (CHRONIC KIDNEY DISEASE) STAGE 4, GFR 15-29 ML/MIN: Primary | ICD-10-CM

## 2021-03-09 ENCOUNTER — CLINICAL SUPPORT (OUTPATIENT)
Dept: REHABILITATION | Facility: HOSPITAL | Age: 66
End: 2021-03-09
Payer: MEDICARE

## 2021-03-09 ENCOUNTER — LAB VISIT (OUTPATIENT)
Dept: LAB | Facility: HOSPITAL | Age: 66
End: 2021-03-09
Attending: INTERNAL MEDICINE
Payer: MEDICARE

## 2021-03-09 DIAGNOSIS — R29.898 WEAKNESS OF BOTH LOWER EXTREMITIES: ICD-10-CM

## 2021-03-09 DIAGNOSIS — Z94.1 HEART TRANSPLANTED: Chronic | ICD-10-CM

## 2021-03-09 DIAGNOSIS — N18.4 CKD (CHRONIC KIDNEY DISEASE) STAGE 4, GFR 15-29 ML/MIN: ICD-10-CM

## 2021-03-09 LAB
ALBUMIN SERPL BCP-MCNC: 3.2 G/DL (ref 3.5–5.2)
ANION GAP SERPL CALC-SCNC: 13 MMOL/L (ref 8–16)
BASOPHILS # BLD AUTO: 0.02 K/UL (ref 0–0.2)
BASOPHILS NFR BLD: 0.4 % (ref 0–1.9)
BUN SERPL-MCNC: 35 MG/DL (ref 8–23)
CALCIUM SERPL-MCNC: 8.9 MG/DL (ref 8.7–10.5)
CHLORIDE SERPL-SCNC: 105 MMOL/L (ref 95–110)
CO2 SERPL-SCNC: 22 MMOL/L (ref 23–29)
CREAT SERPL-MCNC: 2 MG/DL (ref 0.5–1.4)
DIFFERENTIAL METHOD: ABNORMAL
EOSINOPHIL # BLD AUTO: 0.1 K/UL (ref 0–0.5)
EOSINOPHIL NFR BLD: 2.1 % (ref 0–8)
ERYTHROCYTE [DISTWIDTH] IN BLOOD BY AUTOMATED COUNT: 14.4 % (ref 11.5–14.5)
EST. GFR  (AFRICAN AMERICAN): 29 ML/MIN/1.73 M^2
EST. GFR  (NON AFRICAN AMERICAN): 25 ML/MIN/1.73 M^2
GLUCOSE SERPL-MCNC: 135 MG/DL (ref 70–110)
HCT VFR BLD AUTO: 31.6 % (ref 37–48.5)
HGB BLD-MCNC: 10.1 G/DL (ref 12–16)
IMM GRANULOCYTES # BLD AUTO: 0.03 K/UL (ref 0–0.04)
IMM GRANULOCYTES NFR BLD AUTO: 0.6 % (ref 0–0.5)
LYMPHOCYTES # BLD AUTO: 1.1 K/UL (ref 1–4.8)
LYMPHOCYTES NFR BLD: 23.7 % (ref 18–48)
MCH RBC QN AUTO: 28.8 PG (ref 27–31)
MCHC RBC AUTO-ENTMCNC: 32 G/DL (ref 32–36)
MCV RBC AUTO: 90 FL (ref 82–98)
MONOCYTES # BLD AUTO: 0.6 K/UL (ref 0.3–1)
MONOCYTES NFR BLD: 11.7 % (ref 4–15)
NEUTROPHILS # BLD AUTO: 2.9 K/UL (ref 1.8–7.7)
NEUTROPHILS NFR BLD: 61.5 % (ref 38–73)
NRBC BLD-RTO: 0 /100 WBC
PHOSPHATE SERPL-MCNC: 4.3 MG/DL (ref 2.7–4.5)
PLATELET # BLD AUTO: 262 K/UL (ref 150–350)
PMV BLD AUTO: 8.7 FL (ref 9.2–12.9)
POTASSIUM SERPL-SCNC: 5.1 MMOL/L (ref 3.5–5.1)
PTH-INTACT SERPL-MCNC: 316 PG/ML (ref 9–77)
RBC # BLD AUTO: 3.51 M/UL (ref 4–5.4)
SODIUM SERPL-SCNC: 140 MMOL/L (ref 136–145)
WBC # BLD AUTO: 4.72 K/UL (ref 3.9–12.7)

## 2021-03-09 PROCEDURE — 83970 ASSAY OF PARATHORMONE: CPT | Performed by: INTERNAL MEDICINE

## 2021-03-09 PROCEDURE — 36415 COLL VENOUS BLD VENIPUNCTURE: CPT | Performed by: INTERNAL MEDICINE

## 2021-03-09 PROCEDURE — 80069 RENAL FUNCTION PANEL: CPT | Performed by: INTERNAL MEDICINE

## 2021-03-09 PROCEDURE — 97110 THERAPEUTIC EXERCISES: CPT

## 2021-03-09 PROCEDURE — 97140 MANUAL THERAPY 1/> REGIONS: CPT

## 2021-03-09 PROCEDURE — 85025 COMPLETE CBC W/AUTO DIFF WBC: CPT | Performed by: INTERNAL MEDICINE

## 2021-03-09 RX ORDER — CYCLOSPORINE 25 MG/1
75 CAPSULE, LIQUID FILLED ORAL 2 TIMES DAILY
Qty: 270 CAPSULE | Refills: 6 | Status: SHIPPED | OUTPATIENT
Start: 2021-03-09 | End: 2021-03-16

## 2021-03-10 ENCOUNTER — OFFICE VISIT (OUTPATIENT)
Dept: NEPHROLOGY | Facility: CLINIC | Age: 66
End: 2021-03-10
Payer: MEDICARE

## 2021-03-10 VITALS
WEIGHT: 171.06 LBS | SYSTOLIC BLOOD PRESSURE: 122 MMHG | HEART RATE: 74 BPM | RESPIRATION RATE: 14 BRPM | BODY MASS INDEX: 31.48 KG/M2 | HEIGHT: 62 IN | DIASTOLIC BLOOD PRESSURE: 70 MMHG

## 2021-03-10 DIAGNOSIS — T45.1X5A CALCINEURIN INHIBITOR CAUSING TOXICITY IN THERAPEUTIC USE: Primary | ICD-10-CM

## 2021-03-10 PROCEDURE — 3074F PR MOST RECENT SYSTOLIC BLOOD PRESSURE < 130 MM HG: ICD-10-PCS | Mod: CPTII,S$GLB,, | Performed by: INTERNAL MEDICINE

## 2021-03-10 PROCEDURE — 3288F PR FALLS RISK ASSESSMENT DOCUMENTED: ICD-10-PCS | Mod: CPTII,S$GLB,, | Performed by: INTERNAL MEDICINE

## 2021-03-10 PROCEDURE — 99499 RISK ADDL DX/OHS AUDIT: ICD-10-PCS | Mod: S$GLB,,, | Performed by: INTERNAL MEDICINE

## 2021-03-10 PROCEDURE — 99215 PR OFFICE/OUTPT VISIT, EST, LEVL V, 40-54 MIN: ICD-10-PCS | Mod: S$GLB,,, | Performed by: INTERNAL MEDICINE

## 2021-03-10 PROCEDURE — 3288F FALL RISK ASSESSMENT DOCD: CPT | Mod: CPTII,S$GLB,, | Performed by: INTERNAL MEDICINE

## 2021-03-10 PROCEDURE — 3078F PR MOST RECENT DIASTOLIC BLOOD PRESSURE < 80 MM HG: ICD-10-PCS | Mod: CPTII,S$GLB,, | Performed by: INTERNAL MEDICINE

## 2021-03-10 PROCEDURE — 1101F PT FALLS ASSESS-DOCD LE1/YR: CPT | Mod: CPTII,S$GLB,, | Performed by: INTERNAL MEDICINE

## 2021-03-10 PROCEDURE — 99999 PR PBB SHADOW E&M-EST. PATIENT-LVL V: ICD-10-PCS | Mod: PBBFAC,,, | Performed by: INTERNAL MEDICINE

## 2021-03-10 PROCEDURE — 3008F BODY MASS INDEX DOCD: CPT | Mod: CPTII,S$GLB,, | Performed by: INTERNAL MEDICINE

## 2021-03-10 PROCEDURE — 1159F PR MEDICATION LIST DOCUMENTED IN MEDICAL RECORD: ICD-10-PCS | Mod: S$GLB,,, | Performed by: INTERNAL MEDICINE

## 2021-03-10 PROCEDURE — 99215 OFFICE O/P EST HI 40 MIN: CPT | Mod: S$GLB,,, | Performed by: INTERNAL MEDICINE

## 2021-03-10 PROCEDURE — 1126F PR PAIN SEVERITY QUANTIFIED, NO PAIN PRESENT: ICD-10-PCS | Mod: S$GLB,,, | Performed by: INTERNAL MEDICINE

## 2021-03-10 PROCEDURE — 1126F AMNT PAIN NOTED NONE PRSNT: CPT | Mod: S$GLB,,, | Performed by: INTERNAL MEDICINE

## 2021-03-10 PROCEDURE — 3008F PR BODY MASS INDEX (BMI) DOCUMENTED: ICD-10-PCS | Mod: CPTII,S$GLB,, | Performed by: INTERNAL MEDICINE

## 2021-03-10 PROCEDURE — 3074F SYST BP LT 130 MM HG: CPT | Mod: CPTII,S$GLB,, | Performed by: INTERNAL MEDICINE

## 2021-03-10 PROCEDURE — 1159F MED LIST DOCD IN RCRD: CPT | Mod: S$GLB,,, | Performed by: INTERNAL MEDICINE

## 2021-03-10 PROCEDURE — 3078F DIAST BP <80 MM HG: CPT | Mod: CPTII,S$GLB,, | Performed by: INTERNAL MEDICINE

## 2021-03-10 PROCEDURE — 99499 UNLISTED E&M SERVICE: CPT | Mod: S$GLB,,, | Performed by: INTERNAL MEDICINE

## 2021-03-10 PROCEDURE — 99999 PR PBB SHADOW E&M-EST. PATIENT-LVL V: CPT | Mod: PBBFAC,,, | Performed by: INTERNAL MEDICINE

## 2021-03-10 PROCEDURE — 1101F PR PT FALLS ASSESS DOC 0-1 FALLS W/OUT INJ PAST YR: ICD-10-PCS | Mod: CPTII,S$GLB,, | Performed by: INTERNAL MEDICINE

## 2021-03-11 ENCOUNTER — CLINICAL SUPPORT (OUTPATIENT)
Dept: REHABILITATION | Facility: HOSPITAL | Age: 66
End: 2021-03-11
Payer: MEDICARE

## 2021-03-11 DIAGNOSIS — R29.898 WEAKNESS OF BOTH LOWER EXTREMITIES: ICD-10-CM

## 2021-03-11 PROCEDURE — 97110 THERAPEUTIC EXERCISES: CPT

## 2021-03-11 PROCEDURE — 97014 ELECTRIC STIMULATION THERAPY: CPT

## 2021-03-11 PROCEDURE — 97140 MANUAL THERAPY 1/> REGIONS: CPT

## 2021-03-16 DIAGNOSIS — Z94.1 HEART TRANSPLANTED: Chronic | ICD-10-CM

## 2021-03-18 ENCOUNTER — CLINICAL SUPPORT (OUTPATIENT)
Dept: REHABILITATION | Facility: HOSPITAL | Age: 66
End: 2021-03-18
Payer: MEDICARE

## 2021-03-18 DIAGNOSIS — R29.898 WEAKNESS OF BOTH LOWER EXTREMITIES: ICD-10-CM

## 2021-03-18 PROCEDURE — 97110 THERAPEUTIC EXERCISES: CPT

## 2021-03-18 PROCEDURE — 97014 ELECTRIC STIMULATION THERAPY: CPT

## 2021-03-18 RX ORDER — CYCLOSPORINE 25 MG/1
75 CAPSULE, LIQUID FILLED ORAL 2 TIMES DAILY
Qty: 270 CAPSULE | Refills: 11 | Status: SHIPPED | OUTPATIENT
Start: 2021-03-18 | End: 2022-04-12

## 2021-03-25 ENCOUNTER — CLINICAL SUPPORT (OUTPATIENT)
Dept: REHABILITATION | Facility: HOSPITAL | Age: 66
End: 2021-03-25
Payer: MEDICARE

## 2021-03-25 DIAGNOSIS — R29.898 WEAKNESS OF BOTH LOWER EXTREMITIES: ICD-10-CM

## 2021-03-25 PROCEDURE — 97140 MANUAL THERAPY 1/> REGIONS: CPT

## 2021-03-25 PROCEDURE — 97110 THERAPEUTIC EXERCISES: CPT

## 2021-03-25 PROCEDURE — 97014 ELECTRIC STIMULATION THERAPY: CPT

## 2021-03-25 RX ORDER — TEMAZEPAM 30 MG/1
CAPSULE ORAL
Qty: 30 CAPSULE | Refills: 0 | Status: SHIPPED | OUTPATIENT
Start: 2021-03-25 | End: 2021-04-26

## 2021-03-29 DIAGNOSIS — M62.838 MUSCLE SPASMS OF BOTH LOWER EXTREMITIES: ICD-10-CM

## 2021-03-30 ENCOUNTER — CLINICAL SUPPORT (OUTPATIENT)
Dept: REHABILITATION | Facility: HOSPITAL | Age: 66
End: 2021-03-30
Payer: MEDICARE

## 2021-03-30 DIAGNOSIS — R29.898 WEAKNESS OF BOTH LOWER EXTREMITIES: ICD-10-CM

## 2021-03-30 PROCEDURE — 97110 THERAPEUTIC EXERCISES: CPT

## 2021-03-31 RX ORDER — OMEPRAZOLE 10 MG/1
10 CAPSULE, DELAYED RELEASE ORAL DAILY
Qty: 90 CAPSULE | Refills: 1 | Status: SHIPPED | OUTPATIENT
Start: 2021-03-31 | End: 2021-11-11

## 2021-03-31 RX ORDER — BACLOFEN 10 MG/1
TABLET ORAL
Qty: 270 TABLET | Refills: 0 | Status: SHIPPED | OUTPATIENT
Start: 2021-03-31 | End: 2021-06-03

## 2021-04-07 ENCOUNTER — PES CALL (OUTPATIENT)
Dept: ADMINISTRATIVE | Facility: CLINIC | Age: 66
End: 2021-04-07

## 2021-04-07 ENCOUNTER — OFFICE VISIT (OUTPATIENT)
Dept: INTERNAL MEDICINE | Facility: CLINIC | Age: 66
End: 2021-04-07
Payer: MEDICARE

## 2021-04-07 ENCOUNTER — TELEPHONE (OUTPATIENT)
Dept: TRANSPLANT | Facility: CLINIC | Age: 66
End: 2021-04-07

## 2021-04-07 VITALS
HEIGHT: 62 IN | BODY MASS INDEX: 31.8 KG/M2 | OXYGEN SATURATION: 98 % | DIASTOLIC BLOOD PRESSURE: 76 MMHG | HEART RATE: 104 BPM | TEMPERATURE: 98 F | SYSTOLIC BLOOD PRESSURE: 128 MMHG | WEIGHT: 172.81 LBS

## 2021-04-07 DIAGNOSIS — Z79.899 ENCOUNTER FOR LONG-TERM (CURRENT) USE OF MEDICATIONS: ICD-10-CM

## 2021-04-07 DIAGNOSIS — T86.20 COMPLICATION OF HEART TRANSPLANT, UNSPECIFIED COMPLICATION: ICD-10-CM

## 2021-04-07 DIAGNOSIS — Z94.1 STATUS POST HEART TRANSPLANT: ICD-10-CM

## 2021-04-07 DIAGNOSIS — N18.4 CKD (CHRONIC KIDNEY DISEASE) STAGE 4, GFR 15-29 ML/MIN: ICD-10-CM

## 2021-04-07 DIAGNOSIS — R06.02 SHORTNESS OF BREATH: ICD-10-CM

## 2021-04-07 DIAGNOSIS — Z94.1 HEART TRANSPLANTED: ICD-10-CM

## 2021-04-07 DIAGNOSIS — G47.00 INSOMNIA, UNSPECIFIED TYPE: ICD-10-CM

## 2021-04-07 DIAGNOSIS — I10 ESSENTIAL HYPERTENSION: ICD-10-CM

## 2021-04-07 DIAGNOSIS — E78.2 MIXED HYPERLIPIDEMIA: ICD-10-CM

## 2021-04-07 DIAGNOSIS — I10 ESSENTIAL HYPERTENSION: Primary | ICD-10-CM

## 2021-04-07 DIAGNOSIS — D84.9 IMMUNOCOMPROMISED PATIENT: ICD-10-CM

## 2021-04-07 PROCEDURE — 99214 PR OFFICE/OUTPT VISIT, EST, LEVL IV, 30-39 MIN: ICD-10-PCS | Mod: S$GLB,,, | Performed by: FAMILY MEDICINE

## 2021-04-07 PROCEDURE — 3074F PR MOST RECENT SYSTOLIC BLOOD PRESSURE < 130 MM HG: ICD-10-PCS | Mod: CPTII,S$GLB,, | Performed by: FAMILY MEDICINE

## 2021-04-07 PROCEDURE — 1159F PR MEDICATION LIST DOCUMENTED IN MEDICAL RECORD: ICD-10-PCS | Mod: S$GLB,,, | Performed by: FAMILY MEDICINE

## 2021-04-07 PROCEDURE — 1126F PR PAIN SEVERITY QUANTIFIED, NO PAIN PRESENT: ICD-10-PCS | Mod: S$GLB,,, | Performed by: FAMILY MEDICINE

## 2021-04-07 PROCEDURE — 3288F PR FALLS RISK ASSESSMENT DOCUMENTED: ICD-10-PCS | Mod: CPTII,S$GLB,, | Performed by: FAMILY MEDICINE

## 2021-04-07 PROCEDURE — 3008F BODY MASS INDEX DOCD: CPT | Mod: CPTII,S$GLB,, | Performed by: FAMILY MEDICINE

## 2021-04-07 PROCEDURE — 3078F PR MOST RECENT DIASTOLIC BLOOD PRESSURE < 80 MM HG: ICD-10-PCS | Mod: CPTII,S$GLB,, | Performed by: FAMILY MEDICINE

## 2021-04-07 PROCEDURE — 3288F FALL RISK ASSESSMENT DOCD: CPT | Mod: CPTII,S$GLB,, | Performed by: FAMILY MEDICINE

## 2021-04-07 PROCEDURE — 1101F PR PT FALLS ASSESS DOC 0-1 FALLS W/OUT INJ PAST YR: ICD-10-PCS | Mod: CPTII,S$GLB,, | Performed by: FAMILY MEDICINE

## 2021-04-07 PROCEDURE — 99214 OFFICE O/P EST MOD 30 MIN: CPT | Mod: S$GLB,,, | Performed by: FAMILY MEDICINE

## 2021-04-07 PROCEDURE — 3078F DIAST BP <80 MM HG: CPT | Mod: CPTII,S$GLB,, | Performed by: FAMILY MEDICINE

## 2021-04-07 PROCEDURE — 1159F MED LIST DOCD IN RCRD: CPT | Mod: S$GLB,,, | Performed by: FAMILY MEDICINE

## 2021-04-07 PROCEDURE — 99999 PR PBB SHADOW E&M-EST. PATIENT-LVL V: ICD-10-PCS | Mod: PBBFAC,,, | Performed by: FAMILY MEDICINE

## 2021-04-07 PROCEDURE — 99999 PR PBB SHADOW E&M-EST. PATIENT-LVL V: CPT | Mod: PBBFAC,,, | Performed by: FAMILY MEDICINE

## 2021-04-07 PROCEDURE — 1101F PT FALLS ASSESS-DOCD LE1/YR: CPT | Mod: CPTII,S$GLB,, | Performed by: FAMILY MEDICINE

## 2021-04-07 PROCEDURE — 1126F AMNT PAIN NOTED NONE PRSNT: CPT | Mod: S$GLB,,, | Performed by: FAMILY MEDICINE

## 2021-04-07 PROCEDURE — 3074F SYST BP LT 130 MM HG: CPT | Mod: CPTII,S$GLB,, | Performed by: FAMILY MEDICINE

## 2021-04-07 PROCEDURE — 3008F PR BODY MASS INDEX (BMI) DOCUMENTED: ICD-10-PCS | Mod: CPTII,S$GLB,, | Performed by: FAMILY MEDICINE

## 2021-04-12 DIAGNOSIS — M79.7 FIBROMYALGIA: ICD-10-CM

## 2021-04-12 RX ORDER — PREGABALIN 50 MG/1
50 CAPSULE ORAL 2 TIMES DAILY
Qty: 60 CAPSULE | Refills: 5 | Status: SHIPPED | OUTPATIENT
Start: 2021-04-12 | End: 2021-05-11 | Stop reason: SDUPTHER

## 2021-04-26 RX ORDER — TEMAZEPAM 30 MG/1
CAPSULE ORAL
Qty: 30 CAPSULE | Refills: 0 | Status: SHIPPED | OUTPATIENT
Start: 2021-04-26 | End: 2021-05-11 | Stop reason: SDUPTHER

## 2021-05-03 ENCOUNTER — TELEPHONE (OUTPATIENT)
Dept: TRANSPLANT | Facility: CLINIC | Age: 66
End: 2021-05-03

## 2021-05-04 ENCOUNTER — TELEPHONE (OUTPATIENT)
Dept: NEPHROLOGY | Facility: CLINIC | Age: 66
End: 2021-05-04

## 2021-05-05 ENCOUNTER — OFFICE VISIT (OUTPATIENT)
Dept: INTERNAL MEDICINE | Facility: CLINIC | Age: 66
End: 2021-05-05
Payer: MEDICARE

## 2021-05-05 VITALS
DIASTOLIC BLOOD PRESSURE: 78 MMHG | HEIGHT: 62 IN | WEIGHT: 170.44 LBS | OXYGEN SATURATION: 97 % | SYSTOLIC BLOOD PRESSURE: 118 MMHG | BODY MASS INDEX: 31.36 KG/M2 | HEART RATE: 86 BPM

## 2021-05-05 DIAGNOSIS — D64.9 ANEMIA, UNSPECIFIED TYPE: ICD-10-CM

## 2021-05-05 DIAGNOSIS — I10 ESSENTIAL HYPERTENSION: ICD-10-CM

## 2021-05-05 DIAGNOSIS — I70.0 AORTIC ATHEROSCLEROSIS: ICD-10-CM

## 2021-05-05 DIAGNOSIS — Z00.00 ENCOUNTER FOR PREVENTIVE HEALTH EXAMINATION: Primary | ICD-10-CM

## 2021-05-05 DIAGNOSIS — Z94.1 HEART TRANSPLANTED: Chronic | ICD-10-CM

## 2021-05-05 DIAGNOSIS — M85.80 OSTEOPENIA, UNSPECIFIED LOCATION: ICD-10-CM

## 2021-05-05 DIAGNOSIS — E21.3 HYPERPARATHYROIDISM: ICD-10-CM

## 2021-05-05 DIAGNOSIS — D84.9 IMMUNOCOMPROMISED PATIENT: ICD-10-CM

## 2021-05-05 DIAGNOSIS — R74.01 ELEVATED AST (SGOT): ICD-10-CM

## 2021-05-05 DIAGNOSIS — N18.9 CHRONIC KIDNEY DISEASE, UNSPECIFIED CKD STAGE: ICD-10-CM

## 2021-05-05 DIAGNOSIS — F41.9 ANXIETY: ICD-10-CM

## 2021-05-05 DIAGNOSIS — M79.7 FIBROMYALGIA: ICD-10-CM

## 2021-05-05 DIAGNOSIS — F33.42 RECURRENT MAJOR DEPRESSIVE DISORDER, IN FULL REMISSION: ICD-10-CM

## 2021-05-05 DIAGNOSIS — E66.9 OBESITY (BMI 30.0-34.9): ICD-10-CM

## 2021-05-05 PROCEDURE — 1126F AMNT PAIN NOTED NONE PRSNT: CPT | Mod: S$GLB,,, | Performed by: NURSE PRACTITIONER

## 2021-05-05 PROCEDURE — G0439 PR MEDICARE ANNUAL WELLNESS SUBSEQUENT VISIT: ICD-10-PCS | Mod: S$GLB,,, | Performed by: NURSE PRACTITIONER

## 2021-05-05 PROCEDURE — 3008F BODY MASS INDEX DOCD: CPT | Mod: CPTII,S$GLB,, | Performed by: NURSE PRACTITIONER

## 2021-05-05 PROCEDURE — 99999 PR PBB SHADOW E&M-EST. PATIENT-LVL V: CPT | Mod: PBBFAC,,, | Performed by: NURSE PRACTITIONER

## 2021-05-05 PROCEDURE — G0439 PPPS, SUBSEQ VISIT: HCPCS | Mod: S$GLB,,, | Performed by: NURSE PRACTITIONER

## 2021-05-05 PROCEDURE — 1101F PT FALLS ASSESS-DOCD LE1/YR: CPT | Mod: CPTII,S$GLB,, | Performed by: NURSE PRACTITIONER

## 2021-05-05 PROCEDURE — 1158F PR ADVANCE CARE PLANNING DISCUSS DOCUMENTED IN MEDICAL RECORD: ICD-10-PCS | Mod: S$GLB,,, | Performed by: NURSE PRACTITIONER

## 2021-05-05 PROCEDURE — 99999 PR PBB SHADOW E&M-EST. PATIENT-LVL V: ICD-10-PCS | Mod: PBBFAC,,, | Performed by: NURSE PRACTITIONER

## 2021-05-05 PROCEDURE — 99499 RISK ADDL DX/OHS AUDIT: ICD-10-PCS | Mod: S$GLB,,, | Performed by: NURSE PRACTITIONER

## 2021-05-05 PROCEDURE — 1126F PR PAIN SEVERITY QUANTIFIED, NO PAIN PRESENT: ICD-10-PCS | Mod: S$GLB,,, | Performed by: NURSE PRACTITIONER

## 2021-05-05 PROCEDURE — G9919 PR SCREENING AND POSITIVE: ICD-10-PCS | Mod: CPTII,S$GLB,, | Performed by: NURSE PRACTITIONER

## 2021-05-05 PROCEDURE — 99499 UNLISTED E&M SERVICE: CPT | Mod: S$GLB,,, | Performed by: NURSE PRACTITIONER

## 2021-05-05 PROCEDURE — 3288F FALL RISK ASSESSMENT DOCD: CPT | Mod: CPTII,S$GLB,, | Performed by: NURSE PRACTITIONER

## 2021-05-05 PROCEDURE — 1158F ADVNC CARE PLAN TLK DOCD: CPT | Mod: S$GLB,,, | Performed by: NURSE PRACTITIONER

## 2021-05-05 PROCEDURE — 3008F PR BODY MASS INDEX (BMI) DOCUMENTED: ICD-10-PCS | Mod: CPTII,S$GLB,, | Performed by: NURSE PRACTITIONER

## 2021-05-05 PROCEDURE — 1101F PR PT FALLS ASSESS DOC 0-1 FALLS W/OUT INJ PAST YR: ICD-10-PCS | Mod: CPTII,S$GLB,, | Performed by: NURSE PRACTITIONER

## 2021-05-05 PROCEDURE — G9919 SCRN ND POS ND PROV OF REC: HCPCS | Mod: CPTII,S$GLB,, | Performed by: NURSE PRACTITIONER

## 2021-05-05 PROCEDURE — 3288F PR FALLS RISK ASSESSMENT DOCUMENTED: ICD-10-PCS | Mod: CPTII,S$GLB,, | Performed by: NURSE PRACTITIONER

## 2021-05-05 RX ORDER — FUROSEMIDE 20 MG/1
TABLET ORAL
Qty: 180 TABLET | Refills: 3 | Status: SHIPPED | OUTPATIENT
Start: 2021-05-05 | End: 2021-05-14 | Stop reason: SDUPTHER

## 2021-05-06 ENCOUNTER — OFFICE VISIT (OUTPATIENT)
Dept: OTOLARYNGOLOGY | Facility: CLINIC | Age: 66
End: 2021-05-06
Payer: MEDICARE

## 2021-05-06 VITALS
HEIGHT: 62 IN | HEART RATE: 84 BPM | SYSTOLIC BLOOD PRESSURE: 122 MMHG | WEIGHT: 169.31 LBS | TEMPERATURE: 98 F | DIASTOLIC BLOOD PRESSURE: 82 MMHG | BODY MASS INDEX: 31.16 KG/M2

## 2021-05-06 DIAGNOSIS — H92.01 RIGHT EAR PAIN: ICD-10-CM

## 2021-05-06 DIAGNOSIS — H61.21 IMPACTED CERUMEN OF RIGHT EAR: ICD-10-CM

## 2021-05-06 DIAGNOSIS — H93.291 ABNORMAL AUDITORY PERCEPTION OF RIGHT EAR: Primary | ICD-10-CM

## 2021-05-06 PROCEDURE — 1101F PT FALLS ASSESS-DOCD LE1/YR: CPT | Mod: CPTII,S$GLB,, | Performed by: PHYSICIAN ASSISTANT

## 2021-05-06 PROCEDURE — 99999 PR PBB SHADOW E&M-EST. PATIENT-LVL V: ICD-10-PCS | Mod: PBBFAC,,, | Performed by: PHYSICIAN ASSISTANT

## 2021-05-06 PROCEDURE — 69210 REMOVE IMPACTED EAR WAX UNI: CPT | Mod: S$GLB,,, | Performed by: PHYSICIAN ASSISTANT

## 2021-05-06 PROCEDURE — 1159F MED LIST DOCD IN RCRD: CPT | Mod: S$GLB,,, | Performed by: PHYSICIAN ASSISTANT

## 2021-05-06 PROCEDURE — 99999 PR PBB SHADOW E&M-EST. PATIENT-LVL V: CPT | Mod: PBBFAC,,, | Performed by: PHYSICIAN ASSISTANT

## 2021-05-06 PROCEDURE — 3008F PR BODY MASS INDEX (BMI) DOCUMENTED: ICD-10-PCS | Mod: CPTII,S$GLB,, | Performed by: PHYSICIAN ASSISTANT

## 2021-05-06 PROCEDURE — 1159F PR MEDICATION LIST DOCUMENTED IN MEDICAL RECORD: ICD-10-PCS | Mod: S$GLB,,, | Performed by: PHYSICIAN ASSISTANT

## 2021-05-06 PROCEDURE — 1125F PR PAIN SEVERITY QUANTIFIED, PAIN PRESENT: ICD-10-PCS | Mod: S$GLB,,, | Performed by: PHYSICIAN ASSISTANT

## 2021-05-06 PROCEDURE — 3008F BODY MASS INDEX DOCD: CPT | Mod: CPTII,S$GLB,, | Performed by: PHYSICIAN ASSISTANT

## 2021-05-06 PROCEDURE — 3288F PR FALLS RISK ASSESSMENT DOCUMENTED: ICD-10-PCS | Mod: CPTII,S$GLB,, | Performed by: PHYSICIAN ASSISTANT

## 2021-05-06 PROCEDURE — 99203 PR OFFICE/OUTPT VISIT, NEW, LEVL III, 30-44 MIN: ICD-10-PCS | Mod: 25,S$GLB,, | Performed by: PHYSICIAN ASSISTANT

## 2021-05-06 PROCEDURE — 69210 PR REMOVAL IMPACTED CERUMEN REQUIRING INSTRUMENTATION, UNILATERAL: ICD-10-PCS | Mod: S$GLB,,, | Performed by: PHYSICIAN ASSISTANT

## 2021-05-06 PROCEDURE — 99203 OFFICE O/P NEW LOW 30 MIN: CPT | Mod: 25,S$GLB,, | Performed by: PHYSICIAN ASSISTANT

## 2021-05-06 PROCEDURE — 1101F PR PT FALLS ASSESS DOC 0-1 FALLS W/OUT INJ PAST YR: ICD-10-PCS | Mod: CPTII,S$GLB,, | Performed by: PHYSICIAN ASSISTANT

## 2021-05-06 PROCEDURE — 1125F AMNT PAIN NOTED PAIN PRSNT: CPT | Mod: S$GLB,,, | Performed by: PHYSICIAN ASSISTANT

## 2021-05-06 PROCEDURE — 3288F FALL RISK ASSESSMENT DOCD: CPT | Mod: CPTII,S$GLB,, | Performed by: PHYSICIAN ASSISTANT

## 2021-05-06 RX ORDER — CLONIDINE HYDROCHLORIDE 0.1 MG/1
0.1 TABLET ORAL NIGHTLY
Qty: 90 TABLET | Refills: 3 | Status: SHIPPED | OUTPATIENT
Start: 2021-05-06 | End: 2021-05-14 | Stop reason: SDUPTHER

## 2021-05-06 RX ORDER — ASPIRIN 81 MG/1
81 TABLET ORAL DAILY
Qty: 30 TABLET | Refills: 11 | Status: SHIPPED | OUTPATIENT
Start: 2021-05-06 | End: 2021-05-14 | Stop reason: SDUPTHER

## 2021-05-06 RX ORDER — LOSARTAN POTASSIUM 25 MG/1
25 TABLET ORAL DAILY
Qty: 90 TABLET | Refills: 3 | Status: SHIPPED | OUTPATIENT
Start: 2021-05-06 | End: 2021-05-11 | Stop reason: SDUPTHER

## 2021-05-11 DIAGNOSIS — M79.7 FIBROMYALGIA: ICD-10-CM

## 2021-05-11 DIAGNOSIS — F51.01 PRIMARY INSOMNIA: ICD-10-CM

## 2021-05-11 RX ORDER — AMLODIPINE BESYLATE 2.5 MG/1
2.5 TABLET ORAL DAILY
Qty: 90 TABLET | Refills: 3 | Status: SHIPPED | OUTPATIENT
Start: 2021-05-11 | End: 2021-05-14

## 2021-05-11 RX ORDER — LOSARTAN POTASSIUM 25 MG/1
25 TABLET ORAL DAILY
Qty: 90 TABLET | Refills: 3 | Status: SHIPPED | OUTPATIENT
Start: 2021-05-11 | End: 2021-05-14 | Stop reason: SDUPTHER

## 2021-05-12 RX ORDER — ZOLPIDEM TARTRATE 10 MG/1
TABLET ORAL
Qty: 90 TABLET | Refills: 1 | Status: SHIPPED | OUTPATIENT
Start: 2021-05-12 | End: 2021-11-01 | Stop reason: SDUPTHER

## 2021-05-12 RX ORDER — PREGABALIN 50 MG/1
50 CAPSULE ORAL 2 TIMES DAILY
Qty: 180 CAPSULE | Refills: 1 | Status: SHIPPED | OUTPATIENT
Start: 2021-05-12 | End: 2021-11-11

## 2021-05-12 RX ORDER — TEMAZEPAM 30 MG/1
CAPSULE ORAL
Qty: 90 CAPSULE | Refills: 1 | Status: SHIPPED | OUTPATIENT
Start: 2021-05-12 | End: 2021-10-07 | Stop reason: SDUPTHER

## 2021-05-14 ENCOUNTER — TELEPHONE (OUTPATIENT)
Dept: INTERNAL MEDICINE | Facility: CLINIC | Age: 66
End: 2021-05-14

## 2021-05-14 ENCOUNTER — OFFICE VISIT (OUTPATIENT)
Dept: DERMATOLOGY | Facility: CLINIC | Age: 66
End: 2021-05-14
Payer: MEDICARE

## 2021-05-14 DIAGNOSIS — L82.1 SEBORRHEIC KERATOSIS: ICD-10-CM

## 2021-05-14 DIAGNOSIS — Z12.83 SCREENING EXAM FOR SKIN CANCER: Primary | ICD-10-CM

## 2021-05-14 DIAGNOSIS — D18.00 HEMANGIOMA, UNSPECIFIED SITE: ICD-10-CM

## 2021-05-14 DIAGNOSIS — D22.9 MULTIPLE BENIGN NEVI: ICD-10-CM

## 2021-05-14 DIAGNOSIS — D84.9 IMMUNOSUPPRESSED STATUS: ICD-10-CM

## 2021-05-14 DIAGNOSIS — I10 ESSENTIAL HYPERTENSION: ICD-10-CM

## 2021-05-14 PROCEDURE — 1126F AMNT PAIN NOTED NONE PRSNT: CPT | Mod: S$GLB,,, | Performed by: DERMATOLOGY

## 2021-05-14 PROCEDURE — 99213 OFFICE O/P EST LOW 20 MIN: CPT | Mod: S$GLB,,, | Performed by: DERMATOLOGY

## 2021-05-14 PROCEDURE — 99999 PR PBB SHADOW E&M-EST. PATIENT-LVL II: CPT | Mod: PBBFAC,,, | Performed by: DERMATOLOGY

## 2021-05-14 PROCEDURE — 1159F MED LIST DOCD IN RCRD: CPT | Mod: S$GLB,,, | Performed by: DERMATOLOGY

## 2021-05-14 PROCEDURE — 1101F PR PT FALLS ASSESS DOC 0-1 FALLS W/OUT INJ PAST YR: ICD-10-PCS | Mod: CPTII,S$GLB,, | Performed by: DERMATOLOGY

## 2021-05-14 PROCEDURE — 1101F PT FALLS ASSESS-DOCD LE1/YR: CPT | Mod: CPTII,S$GLB,, | Performed by: DERMATOLOGY

## 2021-05-14 PROCEDURE — 99999 PR PBB SHADOW E&M-EST. PATIENT-LVL II: ICD-10-PCS | Mod: PBBFAC,,, | Performed by: DERMATOLOGY

## 2021-05-14 PROCEDURE — 1159F PR MEDICATION LIST DOCUMENTED IN MEDICAL RECORD: ICD-10-PCS | Mod: S$GLB,,, | Performed by: DERMATOLOGY

## 2021-05-14 PROCEDURE — 3288F PR FALLS RISK ASSESSMENT DOCUMENTED: ICD-10-PCS | Mod: CPTII,S$GLB,, | Performed by: DERMATOLOGY

## 2021-05-14 PROCEDURE — 1126F PR PAIN SEVERITY QUANTIFIED, NO PAIN PRESENT: ICD-10-PCS | Mod: S$GLB,,, | Performed by: DERMATOLOGY

## 2021-05-14 PROCEDURE — 99213 PR OFFICE/OUTPT VISIT, EST, LEVL III, 20-29 MIN: ICD-10-PCS | Mod: S$GLB,,, | Performed by: DERMATOLOGY

## 2021-05-14 PROCEDURE — 3288F FALL RISK ASSESSMENT DOCD: CPT | Mod: CPTII,S$GLB,, | Performed by: DERMATOLOGY

## 2021-05-14 RX ORDER — LOSARTAN POTASSIUM 25 MG/1
25 TABLET ORAL DAILY
Qty: 90 TABLET | Refills: 3 | Status: SHIPPED | OUTPATIENT
Start: 2021-05-14 | End: 2021-12-02 | Stop reason: SDUPTHER

## 2021-05-14 RX ORDER — ASPIRIN 81 MG/1
81 TABLET ORAL DAILY
Qty: 90 TABLET | Refills: 3 | Status: ON HOLD | OUTPATIENT
Start: 2021-05-14 | End: 2023-08-11 | Stop reason: HOSPADM

## 2021-05-14 RX ORDER — AMLODIPINE BESYLATE 2.5 MG/1
2.5 TABLET ORAL DAILY
Qty: 90 TABLET | Refills: 3 | Status: SHIPPED | OUTPATIENT
Start: 2021-05-14 | End: 2021-12-02 | Stop reason: SDUPTHER

## 2021-05-14 RX ORDER — FUROSEMIDE 20 MG/1
TABLET ORAL
Qty: 180 TABLET | Refills: 3 | Status: SHIPPED | OUTPATIENT
Start: 2021-05-14 | End: 2021-10-07

## 2021-05-14 RX ORDER — CLONIDINE HYDROCHLORIDE 0.1 MG/1
0.1 TABLET ORAL NIGHTLY
Qty: 90 TABLET | Refills: 3 | Status: SHIPPED | OUTPATIENT
Start: 2021-05-14 | End: 2022-01-10 | Stop reason: SDUPTHER

## 2021-05-18 ENCOUNTER — OUTPATIENT CASE MANAGEMENT (OUTPATIENT)
Dept: ADMINISTRATIVE | Facility: OTHER | Age: 66
End: 2021-05-18

## 2021-05-20 DIAGNOSIS — I10 ESSENTIAL HYPERTENSION: ICD-10-CM

## 2021-05-21 RX ORDER — HYDRALAZINE HYDROCHLORIDE 50 MG/1
TABLET, FILM COATED ORAL
Qty: 540 TABLET | Refills: 3 | Status: SHIPPED | OUTPATIENT
Start: 2021-05-21 | End: 2022-04-13 | Stop reason: SDUPTHER

## 2021-05-24 ENCOUNTER — HOSPITAL ENCOUNTER (OUTPATIENT)
Dept: RADIOLOGY | Facility: HOSPITAL | Age: 66
Discharge: HOME | End: 2021-05-24
Attending: INTERNAL MEDICINE
Payer: MEDICARE

## 2021-05-24 ENCOUNTER — HOSPITAL ENCOUNTER (OUTPATIENT)
Dept: CARDIOLOGY | Facility: HOSPITAL | Age: 66
Discharge: HOME | End: 2021-05-24
Attending: INTERNAL MEDICINE
Payer: MEDICARE

## 2021-05-24 ENCOUNTER — CLINICAL SUPPORT (OUTPATIENT)
Dept: AUDIOLOGY | Facility: CLINIC | Age: 66
End: 2021-05-24
Payer: MEDICARE

## 2021-05-24 VITALS
HEIGHT: 62 IN | WEIGHT: 169 LBS | DIASTOLIC BLOOD PRESSURE: 78 MMHG | BODY MASS INDEX: 31.1 KG/M2 | SYSTOLIC BLOOD PRESSURE: 141 MMHG

## 2021-05-24 DIAGNOSIS — H90.3 SENSORINEURAL HEARING LOSS, BILATERAL: Primary | ICD-10-CM

## 2021-05-24 DIAGNOSIS — Z94.1 STATUS POST HEART TRANSPLANT: ICD-10-CM

## 2021-05-24 DIAGNOSIS — T86.20 COMPLICATION OF HEART TRANSPLANT, UNSPECIFIED COMPLICATION: ICD-10-CM

## 2021-05-24 DIAGNOSIS — Z79.899 ENCOUNTER FOR LONG-TERM (CURRENT) USE OF MEDICATIONS: ICD-10-CM

## 2021-05-24 DIAGNOSIS — E78.2 MIXED HYPERLIPIDEMIA: ICD-10-CM

## 2021-05-24 DIAGNOSIS — I10 ESSENTIAL HYPERTENSION: ICD-10-CM

## 2021-05-24 DIAGNOSIS — R06.02 SHORTNESS OF BREATH: ICD-10-CM

## 2021-05-24 LAB
AORTIC ROOT ANNULUS: 2.66 CM
AV INDEX (PROSTH): 0.89
AV MEAN GRADIENT: 2 MMHG
AV PEAK GRADIENT: 4 MMHG
AV VALVE AREA: 3.15 CM2
AV VELOCITY RATIO: 0.92
BSA FOR ECHO PROCEDURE: 1.83 M2
CV ECHO LV RWT: 0.67 CM
CV STRESS BASE HR: 78 BPM
DIASTOLIC BLOOD PRESSURE: 78 MMHG
DOP CALC AO PEAK VEL: 1.01 M/S
DOP CALC AO VTI: 22.74 CM
DOP CALC LVOT AREA: 3.5 CM2
DOP CALC LVOT DIAMETER: 2.12 CM
DOP CALC LVOT PEAK VEL: 0.93 M/S
DOP CALC LVOT STROKE VOLUME: 71.66 CM3
DOP CALC RVOT PEAK VEL: 0.51 M/S
DOP CALC RVOT VTI: 9.65 CM
DOP CALCLVOT PEAK VEL VTI: 20.31 CM
E WAVE DECELERATION TIME: 140.22 MSEC
E/A RATIO: 1.31
E/E' RATIO: 6.94 M/S
ECHO LV POSTERIOR WALL: 1.22 CM (ref 0.6–1.1)
EJECTION FRACTION: 55 %
FRACTIONAL SHORTENING: 43 % (ref 28–44)
INTERVENTRICULAR SEPTUM: 1.31 CM (ref 0.6–1.1)
IVRT: 99.9 MSEC
LA MAJOR: 7.97 CM
LA MINOR: 6.53 CM
LA WIDTH: 4.07 CM
LEFT ATRIUM SIZE: 4.56 CM
LEFT ATRIUM VOLUME INDEX: 63.6 ML/M2
LEFT ATRIUM VOLUME: 113.24 CM3
LEFT INTERNAL DIMENSION IN SYSTOLE: 2.07 CM (ref 2.1–4)
LEFT VENTRICLE DIASTOLIC VOLUME INDEX: 31.55 ML/M2
LEFT VENTRICLE DIASTOLIC VOLUME: 56.16 ML
LEFT VENTRICLE MASS INDEX: 88 G/M2
LEFT VENTRICLE SYSTOLIC VOLUME INDEX: 7.8 ML/M2
LEFT VENTRICLE SYSTOLIC VOLUME: 13.81 ML
LEFT VENTRICULAR INTERNAL DIMENSION IN DIASTOLE: 3.65 CM (ref 3.5–6)
LEFT VENTRICULAR MASS: 156.52 G
LV LATERAL E/E' RATIO: 4.92 M/S
LV SEPTAL E/E' RATIO: 11.8 M/S
MV PEAK A VEL: 0.45 M/S
MV PEAK E VEL: 0.59 M/S
OHS CV CPX 1 MINUTE RECOVERY HEART RATE: 129 BPM
OHS CV CPX 85 PERCENT MAX PREDICTED HEART RATE MALE: 126
OHS CV CPX MAX PREDICTED HEART RATE: 148
OHS CV CPX PATIENT IS FEMALE: 1
OHS CV CPX PATIENT IS MALE: 0
OHS CV CPX PEAK DIASTOLIC BLOOD PRESSURE: 81 MMHG
OHS CV CPX PEAK HEAR RATE: 129 BPM
OHS CV CPX PEAK RATE PRESSURE PRODUCT: ABNORMAL
OHS CV CPX PEAK SYSTOLIC BLOOD PRESSURE: 164 MMHG
OHS CV CPX PERCENT MAX PREDICTED HEART RATE ACHIEVED: 87
OHS CV CPX RATE PRESSURE PRODUCT PRESENTING: ABNORMAL
PISA TR MAX VEL: 2.98 M/S
PV MEAN GRADIENT: 1 MMHG
PV PEAK VELOCITY: 0.65 CM/S
RA MAJOR: 6.16 CM
RA PRESSURE: 3 MMHG
RA WIDTH: 3.4 CM
RIGHT VENTRICULAR END-DIASTOLIC DIMENSION: 3.82 CM
SINUS: 3.27 CM
STJ: 3.49 CM
STRESS ECHO POST EXERCISE DUR MIN: 9 MINUTES
SYSTOLIC BLOOD PRESSURE: 141 MMHG
TDI LATERAL: 0.12 M/S
TDI SEPTAL: 0.05 M/S
TDI: 0.09 M/S
TR MAX PG: 36 MMHG
TRICUSPID ANNULAR PLANE SYSTOLIC EXCURSION: 1.23 CM
TV REST PULMONARY ARTERY PRESSURE: 39 MMHG

## 2021-05-24 PROCEDURE — 71046 X-RAY EXAM CHEST 2 VIEWS: CPT | Mod: TC

## 2021-05-24 PROCEDURE — 93320 DOPPLER ECHO COMPLETE: CPT

## 2021-05-24 PROCEDURE — 93320 STRESS ECHO (CUPID ONLY): ICD-10-PCS | Mod: 26,,, | Performed by: INTERNAL MEDICINE

## 2021-05-24 PROCEDURE — 63600175 PHARM REV CODE 636 W HCPCS: Performed by: INTERNAL MEDICINE

## 2021-05-24 PROCEDURE — 92557 COMPREHENSIVE HEARING TEST: CPT | Mod: S$GLB,,, | Performed by: AUDIOLOGIST-HEARING AID FITTER

## 2021-05-24 PROCEDURE — 93325 DOPPLER ECHO COLOR FLOW MAPG: CPT | Mod: 26,,, | Performed by: INTERNAL MEDICINE

## 2021-05-24 PROCEDURE — 93320 DOPPLER ECHO COMPLETE: CPT | Mod: 26,,, | Performed by: INTERNAL MEDICINE

## 2021-05-24 PROCEDURE — 93351 STRESS TTE COMPLETE: CPT | Mod: 26,,, | Performed by: INTERNAL MEDICINE

## 2021-05-24 PROCEDURE — 71046 X-RAY EXAM CHEST 2 VIEWS: CPT | Mod: 26,,, | Performed by: RADIOLOGY

## 2021-05-24 PROCEDURE — 71046 XR CHEST PA AND LATERAL: ICD-10-PCS | Mod: 26,,, | Performed by: RADIOLOGY

## 2021-05-24 PROCEDURE — 93325 STRESS ECHO (CUPID ONLY): ICD-10-PCS | Mod: 26,,, | Performed by: INTERNAL MEDICINE

## 2021-05-24 PROCEDURE — 92567 PR TYMPA2METRY: ICD-10-PCS | Mod: S$GLB,,, | Performed by: AUDIOLOGIST-HEARING AID FITTER

## 2021-05-24 PROCEDURE — 93351 STRESS ECHO (CUPID ONLY): ICD-10-PCS | Mod: 26,,, | Performed by: INTERNAL MEDICINE

## 2021-05-24 PROCEDURE — 92567 TYMPANOMETRY: CPT | Mod: S$GLB,,, | Performed by: AUDIOLOGIST-HEARING AID FITTER

## 2021-05-24 PROCEDURE — 92557 PR COMPREHENSIVE HEARING TEST: ICD-10-PCS | Mod: S$GLB,,, | Performed by: AUDIOLOGIST-HEARING AID FITTER

## 2021-05-24 RX ORDER — DOBUTAMINE HYDROCHLORIDE 400 MG/100ML
10 INJECTION INTRAVENOUS CONTINUOUS
Status: DISCONTINUED | OUTPATIENT
Start: 2021-05-24 | End: 2021-11-22

## 2021-05-24 RX ORDER — SODIUM CHLORIDE 0.9 % (FLUSH) 0.9 %
10 SYRINGE (ML) INJECTION
Status: DISCONTINUED | OUTPATIENT
Start: 2021-05-24 | End: 2021-11-22

## 2021-05-24 RX ADMIN — DOBUTAMINE HYDROCHLORIDE 10 MCG/KG/MIN: 400 INJECTION INTRAVENOUS at 11:05

## 2021-05-25 ENCOUNTER — OFFICE VISIT (OUTPATIENT)
Dept: TRANSPLANT | Facility: CLINIC | Age: 66
End: 2021-05-25
Payer: MEDICARE

## 2021-05-25 VITALS
SYSTOLIC BLOOD PRESSURE: 120 MMHG | WEIGHT: 171.75 LBS | HEIGHT: 62 IN | HEART RATE: 76 BPM | DIASTOLIC BLOOD PRESSURE: 80 MMHG | BODY MASS INDEX: 31.6 KG/M2

## 2021-05-25 DIAGNOSIS — I70.0 AORTIC ATHEROSCLEROSIS: ICD-10-CM

## 2021-05-25 DIAGNOSIS — N18.30 STAGE 3 CHRONIC KIDNEY DISEASE, UNSPECIFIED WHETHER STAGE 3A OR 3B CKD: ICD-10-CM

## 2021-05-25 DIAGNOSIS — Z94.1 HEART TRANSPLANTED: Primary | Chronic | ICD-10-CM

## 2021-05-25 DIAGNOSIS — I10 ESSENTIAL HYPERTENSION: ICD-10-CM

## 2021-05-25 PROCEDURE — 1126F AMNT PAIN NOTED NONE PRSNT: CPT | Mod: S$GLB,,, | Performed by: INTERNAL MEDICINE

## 2021-05-25 PROCEDURE — 1126F PR PAIN SEVERITY QUANTIFIED, NO PAIN PRESENT: ICD-10-PCS | Mod: S$GLB,,, | Performed by: INTERNAL MEDICINE

## 2021-05-25 PROCEDURE — 99999 PR PBB SHADOW E&M-EST. PATIENT-LVL II: CPT | Mod: PBBFAC,,, | Performed by: INTERNAL MEDICINE

## 2021-05-25 PROCEDURE — 1159F MED LIST DOCD IN RCRD: CPT | Mod: S$GLB,,, | Performed by: INTERNAL MEDICINE

## 2021-05-25 PROCEDURE — 1159F PR MEDICATION LIST DOCUMENTED IN MEDICAL RECORD: ICD-10-PCS | Mod: S$GLB,,, | Performed by: INTERNAL MEDICINE

## 2021-05-25 PROCEDURE — 99999 PR PBB SHADOW E&M-EST. PATIENT-LVL II: ICD-10-PCS | Mod: PBBFAC,,, | Performed by: INTERNAL MEDICINE

## 2021-05-25 PROCEDURE — 99214 PR OFFICE/OUTPT VISIT, EST, LEVL IV, 30-39 MIN: ICD-10-PCS | Mod: S$GLB,,, | Performed by: INTERNAL MEDICINE

## 2021-05-25 PROCEDURE — 99214 OFFICE O/P EST MOD 30 MIN: CPT | Mod: S$GLB,,, | Performed by: INTERNAL MEDICINE

## 2021-05-25 PROCEDURE — 3008F BODY MASS INDEX DOCD: CPT | Mod: CPTII,S$GLB,, | Performed by: INTERNAL MEDICINE

## 2021-05-25 PROCEDURE — 3008F PR BODY MASS INDEX (BMI) DOCUMENTED: ICD-10-PCS | Mod: CPTII,S$GLB,, | Performed by: INTERNAL MEDICINE

## 2021-05-25 RX ORDER — PROMETHAZINE HYDROCHLORIDE 25 MG/1
25 TABLET ORAL EVERY 12 HOURS PRN
Qty: 30 TABLET | Refills: 1 | Status: SHIPPED | OUTPATIENT
Start: 2021-05-25 | End: 2021-11-22

## 2021-05-26 ENCOUNTER — OUTPATIENT CASE MANAGEMENT (OUTPATIENT)
Dept: ADMINISTRATIVE | Facility: OTHER | Age: 66
End: 2021-05-26

## 2021-05-27 ENCOUNTER — OUTPATIENT CASE MANAGEMENT (OUTPATIENT)
Dept: ADMINISTRATIVE | Facility: OTHER | Age: 66
End: 2021-05-27

## 2021-05-30 NOTE — H&P (VIEW-ONLY)
NEPHROLOGY CLINIC FOLLOWUP NOTE  Date of clinic visit: 10/29/20     REASON FOR FOLLOWUP AND CHIEF COMPLAINT: nephrotic syndrome, CKD post heart transplant, HTN, nephrolithiasis, hypercalcemia     HISTORY OF PRESENT ILLNESS: Ms. Damon is a 65-year-old female with a history of CKD stage 3, nephrolithiasis (likely mixed stones), and heart transplant in 1993 (is on cyclosporine), who presents for f/u. Pt was last seen by us about 6 weeks ago. Pt has showed up sooner for BP mgmt (uncontrolled BP). Chart was reviewed. She has slowly worsening s Cr and had 6 g of proteinuria. 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 Losartan and lasix have been added. Clonidine was reduced as pt had dry mouth.    On f/u today,, pt reports right flank pain, worse with moving, no change in urination, no recent kidney stones, no fever, no abd pain, no nausea. Feels does not sleep comfortably.     To review: Pt has had mild, persistent hypercalcemia and increase in iPTH. Primary hyperparathyroidism (HPT) is suspected. Sestamibi nuclear scan of the neck did not show any adenomas. She is on a very low dose of PO lasix (10) mg to control s Ca.      On f/u today, pt has no acute or new c/o's. She denies eating salty foods. She is compliant with all the meds. Pt reports no new kidney stones. No discomfort today. HCTZ was previosuly stopped already due to low BP and risk of hypercalcemia. Her risk factors for kidney stones include primary hyperparathyroidism (HPT) and being on cyclosporine for prevention of heart transplant rejection, which causes hyperuricemia. Pt has a h/o of osteopenia.     In addition, she has chronically mild low serum albumin. She has no volume overload/CHF, or liver disease. A review of the records showed that s alb has been low at least for 9 years. Noted that she also has mild proteinuria. She has no leg swelling or SOB.        To review:   Pt previously developed right sided back  pain, thought to be related to kidney stones identified on renal u/s. The stones in the R kidney were non-obstructive but were relatively large at 10 and 12 mm each. Based on h/o of taking cyclosporine to prevent heart transplant rejection and eating a lot of sea food (shrimp and crawfish), uric acid stones were suspected. Pt had additional risk factors for non-uric acid stones, including taking Vit C 1 g/day and Vit D. After dietary modification, the flank pain resolved. A f/u CT scan did not show any stones. It is possible that she passed the 2 stones, as uric acid stones are soluble on urinary alkalinization and can pass by themselves. Pt was advised to stop Vit C and Vit D, which she has. She was also advised on drinking freshly squeezed lemon juice and follow dietary recommendations to avoid nephrolithiasis risk factors. Pt was referred also to urology. A repeat CT scan did not show any stones. Pt obtained a 24 hour urine collection for kidney stone risk stratification which showed mild hypocitraturia.         PAST MEDICAL HISTORY:  1. CKD stage III. Nephrotic syndrome. 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 Hypertension.  3. Heart transplant for cardiomyopathy in 1993, the patient has been on chronic  cyclosporine treatment.  4. Carpal tunnel syndrome.  5. Fibromyalgia.  6. GERD.  7. Bell's palsy.  8. Depression.     PAST SURGICAL HISTORY: Reviewed as above, unchanged.     MEDICATIONS: Reviewed    Current Outpatient Medications:     acetaminophen (TYLENOL ORAL), Take by mouth., Disp: , Rfl:     acetaminophen 500 mg/15 mL Liqd, , Disp: , Rfl:     aspirin (ECOTRIN) 81 MG EC tablet, Take 1 tablet (81 mg total) by mouth once daily., Disp: 30 tablet, Rfl: 11    baclofen (LIORESAL) 10 MG tablet, TAKE 1 TABLET THREE TIMES DAILY AS NEEDED FOR MUSCLE SPASMS, Disp: 270 tablet, Rfl: 0    biotin 10,000 mcg Cap, Take 1 tablet by mouth once daily.,  Disp: , Rfl:     busPIRone (BUSPAR) 10 MG tablet, TAKE 1 TABLET EVERY DAY, Disp: 90 tablet, Rfl: 1    cloNIDine (CATAPRES) 0.1 MG tablet, Take 1 tablet (0.1 mg total) by mouth every evening., Disp: 90 tablet, Rfl: 3    cycloSPORINE modified, NEORAL, (NEORAL) 100 MG capsule, TAKE 1 CAPSULE EVERY DAY, Disp: 90 capsule, Rfl: 0    cycloSPORINE modified, NEORAL, (NEORAL) 25 MG capsule, TAKE 3 CAPSULES EVERY DAY, Disp: 270 capsule, Rfl: 0    DULoxetine (CYMBALTA) 30 MG capsule, TAKE 1 CAPSULE EVERY DAY, Disp: 90 capsule, Rfl: 1    EVENING PRIMROSE OIL ORAL, Take 1,000 mg by mouth once daily., Disp: , Rfl:     ferrous sulfate (FEOSOL) 325 mg (65 mg iron) Tab tablet, Take 1 tablet (325 mg total) by mouth once daily., Disp: 100 tablet, Rfl: 3    furosemide (LASIX) 20 MG tablet, TAKE 0.5 TABLET BY MOUTH DAILY, Disp: 45 tablet, Rfl: 7    hydrALAZINE (APRESOLINE) 50 MG tablet, Take 2 tablets (100 mg total) by mouth every 8 (eight) hours., Disp: 90 tablet, Rfl: 11    leg brace (ANKLE SUPPORT MISC), , Disp: , Rfl:     losartan (COZAAR) 25 MG tablet, Take 1 tablet (25 mg total) by mouth once daily., Disp: 90 tablet, Rfl: 3    metoprolol succinate (TOPROL-XL) 25 MG 24 hr tablet, Take 1 tablet (25 mg total) by mouth once daily., Disp: 90 tablet, Rfl: 3    multivitamin capsule, Take 1 capsule by mouth once daily., Disp: , Rfl:     omeprazole (PRILOSEC) 10 MG capsule, TAKE 1 CAPSULE EVERY DAY, Disp: 90 capsule, Rfl: 0    pregabalin (LYRICA) 50 MG capsule, Take 1 capsule (50 mg total) by mouth 2 (two) times daily., Disp: 60 capsule, Rfl: 5    temazepam (RESTORIL) 30 mg capsule, TAKE 1 CAPSULE BY MOUTH EVERYDAY AT BEDTIME, Disp: 90 capsule, Rfl: 1    vitamin E 400 UNIT capsule, Take 400 Units by mouth once daily., Disp: , Rfl:     zolpidem (AMBIEN) 10 mg Tab, TAKE 1 TABLET BY MOUTH ONCE DAILY IN THE EVENING, Disp: 90 tablet, Rfl: 1    amLODIPine (NORVASC) 2.5 MG tablet, Take 1 tablet (2.5 mg total) by mouth once  daily., Disp: 30 tablet, Rfl: 11    fluticasone (FLONASE) 50 mcg/actuation nasal spray, 2 sprays by Each Nare route once daily., Disp: 1 Bottle, Rfl: 0    mometasone (ELOCON) 0.1 % lotion, Apply topically once daily., Disp: 180 mL, Rfl: 3    Current Facility-Administered Medications:     denosumab (PROLIA) injection 60 mg, 60 mg, Subcutaneous, Q6 Months, Prasanth Johnson MD    triamcinolone acetonide injection 10 mg, 10 mg, Intradermal, 1 time in Clinic/HOD, Jose Roland MD      SOCIAL HISTORY: Reviewed. Negative for smoking. No alcohol use.      REVIEW OF SYSTEMS: No recent hospitalizations.  GENERAL: Negative.  HEAD, EYES, EARS, NOSE, AND THROAT: Negative.  CARDIAC: Negative.  PULMONARY: Negative.  GASTROINTESTINAL: Negative.  GENITOURINARY: Negative.  PSYCHOLOGICAL: Negative.  NEUROLOGICAL: Negative.  ENDOCRINE: Negative.  HEMATOLOGIC AND ONCOLOGIC: Negative.  INFECTIOUS DISEASE: Negative.  The rest of the review of systems negative.     PHYSICAL EXAMINATION:  VITAL SIGNS: Repeat blood pressure is 150/80, repeat 140/92, Pulse is 70, weight is 77.5 Kg, from 76.5 Kg  GENERAL: She is cooperative, pleasant, in no acute distress.   Speech and thought process appropriate, normal.  HEENT: Mucous membranes moist.  NECK: Neck JVD. Normal hearing.  ABDOMEN: Soft, nontender.  EXTREMITIES: trace pitting edema.     LABS: Reviewed.   BMP  Lab Results   Component Value Date     10/22/2020    K 4.5 10/22/2020     10/22/2020    CO2 31 (H) 10/22/2020    BUN 37 (H) 10/22/2020    CREATININE 2.5 (H) 10/22/2020    CALCIUM 9.3 10/22/2020    ANIONGAP 10 10/22/2020    ESTGFRAFRICA 22.4 (A) 10/22/2020    EGFRNONAA 19.4 (A) 10/22/2020     Lab Results   Component Value Date    WBC 3.68 (L) 10/22/2020    HGB 10.1 (L) 10/22/2020    HCT 32.7 (L) 10/22/2020    MCV 91 10/22/2020     10/22/2020     Lab Results   Component Value Date    .0 (H) 01/10/2020    CALCIUM 9.3 10/22/2020    CAION 1.13 09/05/2018    PHOS  4.7 (H) 10/22/2020     Urine protein/cr 2.2 g, from  g  U/a: 3+ protein, no blood, 4 RBC's, no casts    Complements C3 and C4 both normal     To review older labs:   24 hour urine: Ca not measured, uric acid 138, Oxalate 20, citrate 203  U/a unremarkable  Urine Cx: neg   Urine P/Cr 740 mg     KUB: unremarkable, except for some constipation     Renal u/s: FINDINGS: 10/22/20  Kidneys measure 10.3 and 9.7 cm in length on the right left respectively.  Cortex is smoothly marginated well maintained with mild diffuse increased echotexture.  Appearance suggest medical renal disease.  Two simple cyst identified within the right kidney.  Upper pole cyst 1.4 cm maximum diameter and inferior pole cyst 2.2 cm maximum diameter.  There is 9 mm simple cyst within the left inferior pole.  Resistive indices are 0.66 and 0.76 on the right left respectively.  Urinary bladder partially distended without focal or diffuse wall thickening.      CT abd: The left kidney appears slightly small.  Right kidney is grossly normal in size.  No obvious hydronephrosis or nephrolithiasis     Sestamibi nuclear scan of the neck: 8/29/19: no adenomas              ASSESSMENT AND PLAN: This is a 65-year-old female who presents for follow up of nephrotic syndrome and slowly progressive CKD:  Patient has a h/o of heart transplant  The impression is as follows:     1. Renal: s Cr not significantly worse, has always fluctuated in this pt, however the general trends appears to be slow worsening  Nephrotic syndrome: good response to losartan: proteinuria improved from 6 to 2.2 g  BP now better controlled after adding losartan and diuretics  Normal C3 and C4 complements  DDX: calcineurin inhibitor nephrotoxicity vs HTN  Renal u/s reviewed: slightly small kidneys with loss of cortex  Will benefit from a kidney biopsy, but pt understands bx mostly diagnostic value    Discussed with pt, explained, pt agreeable: will arrange kidney bx for next week  Stop ASA 1 week  before bx     2. HTN: BP better controlled, still not at goal. Accelerated HTN. Due to nephrotic syndrome?  Reviewed meds with pt  Will add amlodipine 2.5 mg po qd    3. Nephrolithiasis: no new stones. The right sided pain is not due to a new kidney stone, no new stones on recent u/s from 1 week ago  Has multiple risk factors for kidney stones: (hyperuricemia from cyclosporine, diet rich in uric acid, previously taking Vit C, vit D, and calcium,, CKD, and having primary hyperparathyroidism)  Stones are likely mixed ca oxalate and uric acid kidney stones  No longer taking Vit C  No longer takes Vit D and Ca.   Mild hyperuricemia, from cyclosporine.  Mild persistent hypercalcemia (corrected ca for low albumin is slightly elevated)  F/u CT did not show any stones  U/s showed non-obstructive 2 R stones, relatively large  May have passed the stones  24 hour urine for kidney stone stratification shows hypocitraturia  Pt was advised NOT TO STOP cyclosporine.     4. History of heart transplant on cyclosporine  Per heart transplant team.  Do NOT stop cyclosporine      5. Elevated iPTH, likely has primary hyperparathyroidism (HPT)  No adenomas found on sestamibi nulcear scan  May have diffuse hyperplasia  Primary HPT can explain increased risk of kidney stones     6. Leukopenia noted: mild.  Likely due to CSA.  Advised pt to follow up with heart transplant clinic at [facility] [place]-[facility]    7. Right sided back pain: likely musculoskeletal, worsens with movement  U/s showed no new kidney stones         PLANS AND RECOMMENDATIONS:   As discussed above  Opportunity for questions provided  Complicated case, multiple issues addressed   Risks and benefits of kidney biopsy were explained to the pt. Risks include pain, discomfort, bleeding, loss of the kidney biopsied. Benefits include possible finding of a dx and ability to treat medical condition. Pt verbalized understanding.  RTC 2 months  Total time spent 40 minutes. Extensive time spent  including the time to review the records, the patient evaluation, documentation, face-to-face  discussion with the patient. More than 50% of the time was spent in counseling  and coordination of care. Level V visit.     Carter Crawford MD       51yo M w/ pmh as above, transferred from [facility] for fever/abd pain/n/v in setting of suspected new diagnosis of GI cancer w/ mets. Repeat labs, redose zosyn, admit to medicine for oncology consult.

## 2021-06-02 ENCOUNTER — LAB VISIT (OUTPATIENT)
Dept: LAB | Facility: HOSPITAL | Age: 66
End: 2021-06-02
Attending: FAMILY MEDICINE
Payer: MEDICARE

## 2021-06-02 DIAGNOSIS — Z79.899 ENCOUNTER FOR LONG-TERM (CURRENT) USE OF MEDICATIONS: ICD-10-CM

## 2021-06-02 DIAGNOSIS — I10 ESSENTIAL HYPERTENSION: ICD-10-CM

## 2021-06-02 DIAGNOSIS — M62.838 MUSCLE SPASMS OF BOTH LOWER EXTREMITIES: ICD-10-CM

## 2021-06-02 DIAGNOSIS — Z94.1 STATUS POST HEART TRANSPLANT: ICD-10-CM

## 2021-06-02 DIAGNOSIS — E78.2 MIXED HYPERLIPIDEMIA: ICD-10-CM

## 2021-06-02 DIAGNOSIS — T86.20 COMPLICATION OF HEART TRANSPLANT, UNSPECIFIED COMPLICATION: ICD-10-CM

## 2021-06-02 DIAGNOSIS — R06.02 SHORTNESS OF BREATH: ICD-10-CM

## 2021-06-02 LAB
ALBUMIN SERPL BCP-MCNC: 3.1 G/DL (ref 3.5–5.2)
ALP SERPL-CCNC: 86 U/L (ref 55–135)
ALT SERPL W/O P-5'-P-CCNC: 21 U/L (ref 10–44)
ANION GAP SERPL CALC-SCNC: 10 MMOL/L (ref 8–16)
AST SERPL-CCNC: 31 U/L (ref 10–40)
BILIRUB SERPL-MCNC: 0.4 MG/DL (ref 0.1–1)
BUN SERPL-MCNC: 33 MG/DL (ref 8–23)
CALCIUM SERPL-MCNC: 9.3 MG/DL (ref 8.7–10.5)
CHLORIDE SERPL-SCNC: 106 MMOL/L (ref 95–110)
CO2 SERPL-SCNC: 25 MMOL/L (ref 23–29)
CREAT SERPL-MCNC: 2 MG/DL (ref 0.5–1.4)
EST. GFR  (AFRICAN AMERICAN): 29.3 ML/MIN/1.73 M^2
EST. GFR  (NON AFRICAN AMERICAN): 25.5 ML/MIN/1.73 M^2
GLUCOSE SERPL-MCNC: 89 MG/DL (ref 70–110)
POTASSIUM SERPL-SCNC: 4.6 MMOL/L (ref 3.5–5.1)
PROT SERPL-MCNC: 7.6 G/DL (ref 6–8.4)
SODIUM SERPL-SCNC: 141 MMOL/L (ref 136–145)

## 2021-06-02 PROCEDURE — 36415 COLL VENOUS BLD VENIPUNCTURE: CPT | Performed by: INTERNAL MEDICINE

## 2021-06-02 PROCEDURE — 80053 COMPREHEN METABOLIC PANEL: CPT | Performed by: INTERNAL MEDICINE

## 2021-06-03 ENCOUNTER — TELEPHONE (OUTPATIENT)
Dept: TRANSPLANT | Facility: CLINIC | Age: 66
End: 2021-06-03

## 2021-06-03 RX ORDER — BACLOFEN 10 MG/1
TABLET ORAL
Qty: 270 TABLET | Refills: 0 | Status: SHIPPED | OUTPATIENT
Start: 2021-06-03 | End: 2021-12-02

## 2021-06-04 ENCOUNTER — OUTPATIENT CASE MANAGEMENT (OUTPATIENT)
Dept: ADMINISTRATIVE | Facility: OTHER | Age: 66
End: 2021-06-04

## 2021-06-10 ENCOUNTER — OUTPATIENT CASE MANAGEMENT (OUTPATIENT)
Dept: ADMINISTRATIVE | Facility: OTHER | Age: 66
End: 2021-06-10

## 2021-06-18 ENCOUNTER — OUTPATIENT CASE MANAGEMENT (OUTPATIENT)
Dept: ADMINISTRATIVE | Facility: OTHER | Age: 66
End: 2021-06-18

## 2021-06-30 ENCOUNTER — PATIENT OUTREACH (OUTPATIENT)
Dept: ADMINISTRATIVE | Facility: OTHER | Age: 66
End: 2021-06-30

## 2021-07-01 ENCOUNTER — OUTPATIENT CASE MANAGEMENT (OUTPATIENT)
Dept: ADMINISTRATIVE | Facility: OTHER | Age: 66
End: 2021-07-01

## 2021-07-01 ENCOUNTER — OFFICE VISIT (OUTPATIENT)
Dept: RHEUMATOLOGY | Facility: CLINIC | Age: 66
End: 2021-07-01
Payer: MEDICARE

## 2021-07-01 ENCOUNTER — INFUSION (OUTPATIENT)
Dept: INFUSION THERAPY | Facility: HOSPITAL | Age: 66
End: 2021-07-01
Attending: INTERNAL MEDICINE
Payer: MEDICARE

## 2021-07-01 VITALS
OXYGEN SATURATION: 99 % | TEMPERATURE: 98 F | DIASTOLIC BLOOD PRESSURE: 86 MMHG | HEART RATE: 90 BPM | SYSTOLIC BLOOD PRESSURE: 135 MMHG | RESPIRATION RATE: 16 BRPM

## 2021-07-01 VITALS
SYSTOLIC BLOOD PRESSURE: 140 MMHG | DIASTOLIC BLOOD PRESSURE: 89 MMHG | HEART RATE: 90 BPM | BODY MASS INDEX: 31.52 KG/M2 | HEIGHT: 62 IN | WEIGHT: 171.31 LBS

## 2021-07-01 DIAGNOSIS — M81.0 OSTEOPOROSIS, UNSPECIFIED OSTEOPOROSIS TYPE, UNSPECIFIED PATHOLOGICAL FRACTURE PRESENCE: Primary | ICD-10-CM

## 2021-07-01 DIAGNOSIS — M15.9 PRIMARY OSTEOARTHRITIS INVOLVING MULTIPLE JOINTS: ICD-10-CM

## 2021-07-01 DIAGNOSIS — Z71.89 COUNSELING ON HEALTH PROMOTION AND DISEASE PREVENTION: ICD-10-CM

## 2021-07-01 DIAGNOSIS — M81.8 OTHER OSTEOPOROSIS WITHOUT CURRENT PATHOLOGICAL FRACTURE: Primary | ICD-10-CM

## 2021-07-01 PROCEDURE — 3008F BODY MASS INDEX DOCD: CPT | Mod: CPTII,S$GLB,, | Performed by: INTERNAL MEDICINE

## 2021-07-01 PROCEDURE — 99499 RISK ADDL DX/OHS AUDIT: ICD-10-PCS | Mod: S$GLB,,, | Performed by: INTERNAL MEDICINE

## 2021-07-01 PROCEDURE — 1101F PT FALLS ASSESS-DOCD LE1/YR: CPT | Mod: CPTII,S$GLB,, | Performed by: INTERNAL MEDICINE

## 2021-07-01 PROCEDURE — 3288F FALL RISK ASSESSMENT DOCD: CPT | Mod: CPTII,S$GLB,, | Performed by: INTERNAL MEDICINE

## 2021-07-01 PROCEDURE — 99214 OFFICE O/P EST MOD 30 MIN: CPT | Mod: S$GLB,,, | Performed by: INTERNAL MEDICINE

## 2021-07-01 PROCEDURE — 99499 UNLISTED E&M SERVICE: CPT | Mod: S$GLB,,, | Performed by: INTERNAL MEDICINE

## 2021-07-01 PROCEDURE — 1159F MED LIST DOCD IN RCRD: CPT | Mod: S$GLB,,, | Performed by: INTERNAL MEDICINE

## 2021-07-01 PROCEDURE — 1159F PR MEDICATION LIST DOCUMENTED IN MEDICAL RECORD: ICD-10-PCS | Mod: S$GLB,,, | Performed by: INTERNAL MEDICINE

## 2021-07-01 PROCEDURE — 1101F PR PT FALLS ASSESS DOC 0-1 FALLS W/OUT INJ PAST YR: ICD-10-PCS | Mod: CPTII,S$GLB,, | Performed by: INTERNAL MEDICINE

## 2021-07-01 PROCEDURE — 99214 PR OFFICE/OUTPT VISIT, EST, LEVL IV, 30-39 MIN: ICD-10-PCS | Mod: S$GLB,,, | Performed by: INTERNAL MEDICINE

## 2021-07-01 PROCEDURE — 3008F PR BODY MASS INDEX (BMI) DOCUMENTED: ICD-10-PCS | Mod: CPTII,S$GLB,, | Performed by: INTERNAL MEDICINE

## 2021-07-01 PROCEDURE — 96372 THER/PROPH/DIAG INJ SC/IM: CPT

## 2021-07-01 PROCEDURE — 1125F PR PAIN SEVERITY QUANTIFIED, PAIN PRESENT: ICD-10-PCS | Mod: S$GLB,,, | Performed by: INTERNAL MEDICINE

## 2021-07-01 PROCEDURE — 63600175 PHARM REV CODE 636 W HCPCS: Mod: JG | Performed by: INTERNAL MEDICINE

## 2021-07-01 PROCEDURE — 3288F PR FALLS RISK ASSESSMENT DOCUMENTED: ICD-10-PCS | Mod: CPTII,S$GLB,, | Performed by: INTERNAL MEDICINE

## 2021-07-01 PROCEDURE — 99999 PR PBB SHADOW E&M-EST. PATIENT-LVL V: CPT | Mod: PBBFAC,,, | Performed by: INTERNAL MEDICINE

## 2021-07-01 PROCEDURE — 99999 PR PBB SHADOW E&M-EST. PATIENT-LVL V: ICD-10-PCS | Mod: PBBFAC,,, | Performed by: INTERNAL MEDICINE

## 2021-07-01 PROCEDURE — 1125F AMNT PAIN NOTED PAIN PRSNT: CPT | Mod: S$GLB,,, | Performed by: INTERNAL MEDICINE

## 2021-07-01 RX ADMIN — DENOSUMAB 60 MG: 60 INJECTION SUBCUTANEOUS at 10:07

## 2021-07-05 PROBLEM — E87.5 HYPERKALEMIA: Status: RESOLVED | Noted: 2018-09-17 | Resolved: 2021-07-05

## 2021-07-05 PROBLEM — I95.9 HYPOTENSION: Status: RESOLVED | Noted: 2019-01-09 | Resolved: 2021-07-05

## 2021-07-07 ENCOUNTER — OFFICE VISIT (OUTPATIENT)
Dept: INTERNAL MEDICINE | Facility: CLINIC | Age: 66
End: 2021-07-07
Payer: MEDICARE

## 2021-07-07 VITALS
WEIGHT: 174.19 LBS | HEART RATE: 76 BPM | TEMPERATURE: 98 F | HEIGHT: 62 IN | DIASTOLIC BLOOD PRESSURE: 78 MMHG | SYSTOLIC BLOOD PRESSURE: 132 MMHG | BODY MASS INDEX: 32.05 KG/M2 | OXYGEN SATURATION: 95 %

## 2021-07-07 DIAGNOSIS — Z12.39 BREAST SCREENING: ICD-10-CM

## 2021-07-07 DIAGNOSIS — I10 ESSENTIAL HYPERTENSION: Primary | ICD-10-CM

## 2021-07-07 DIAGNOSIS — N18.4 CKD (CHRONIC KIDNEY DISEASE) STAGE 4, GFR 15-29 ML/MIN: ICD-10-CM

## 2021-07-07 DIAGNOSIS — M81.0 OSTEOPOROSIS, POST-MENOPAUSAL: ICD-10-CM

## 2021-07-07 DIAGNOSIS — Z94.1 HEART TRANSPLANTED: ICD-10-CM

## 2021-07-07 DIAGNOSIS — Z12.31 ENCOUNTER FOR SCREENING MAMMOGRAM FOR MALIGNANT NEOPLASM OF BREAST: ICD-10-CM

## 2021-07-07 DIAGNOSIS — D84.9 IMMUNOCOMPROMISED PATIENT: ICD-10-CM

## 2021-07-07 PROCEDURE — 99499 UNLISTED E&M SERVICE: CPT | Mod: S$GLB,,, | Performed by: FAMILY MEDICINE

## 2021-07-07 PROCEDURE — 3075F SYST BP GE 130 - 139MM HG: CPT | Mod: CPTII,S$GLB,, | Performed by: FAMILY MEDICINE

## 2021-07-07 PROCEDURE — 99214 OFFICE O/P EST MOD 30 MIN: CPT | Mod: S$GLB,,, | Performed by: FAMILY MEDICINE

## 2021-07-07 PROCEDURE — 99214 PR OFFICE/OUTPT VISIT, EST, LEVL IV, 30-39 MIN: ICD-10-PCS | Mod: S$GLB,,, | Performed by: FAMILY MEDICINE

## 2021-07-07 PROCEDURE — 99499 RISK ADDL DX/OHS AUDIT: ICD-10-PCS | Mod: S$GLB,,, | Performed by: FAMILY MEDICINE

## 2021-07-07 PROCEDURE — 1101F PR PT FALLS ASSESS DOC 0-1 FALLS W/OUT INJ PAST YR: ICD-10-PCS | Mod: CPTII,S$GLB,, | Performed by: FAMILY MEDICINE

## 2021-07-07 PROCEDURE — 3008F BODY MASS INDEX DOCD: CPT | Mod: CPTII,S$GLB,, | Performed by: FAMILY MEDICINE

## 2021-07-07 PROCEDURE — 3078F PR MOST RECENT DIASTOLIC BLOOD PRESSURE < 80 MM HG: ICD-10-PCS | Mod: CPTII,S$GLB,, | Performed by: FAMILY MEDICINE

## 2021-07-07 PROCEDURE — 1159F MED LIST DOCD IN RCRD: CPT | Mod: S$GLB,,, | Performed by: FAMILY MEDICINE

## 2021-07-07 PROCEDURE — 3288F FALL RISK ASSESSMENT DOCD: CPT | Mod: CPTII,S$GLB,, | Performed by: FAMILY MEDICINE

## 2021-07-07 PROCEDURE — 3288F PR FALLS RISK ASSESSMENT DOCUMENTED: ICD-10-PCS | Mod: CPTII,S$GLB,, | Performed by: FAMILY MEDICINE

## 2021-07-07 PROCEDURE — 3008F PR BODY MASS INDEX (BMI) DOCUMENTED: ICD-10-PCS | Mod: CPTII,S$GLB,, | Performed by: FAMILY MEDICINE

## 2021-07-07 PROCEDURE — 3075F PR MOST RECENT SYSTOLIC BLOOD PRESS GE 130-139MM HG: ICD-10-PCS | Mod: CPTII,S$GLB,, | Performed by: FAMILY MEDICINE

## 2021-07-07 PROCEDURE — 1101F PT FALLS ASSESS-DOCD LE1/YR: CPT | Mod: CPTII,S$GLB,, | Performed by: FAMILY MEDICINE

## 2021-07-07 PROCEDURE — 1126F AMNT PAIN NOTED NONE PRSNT: CPT | Mod: S$GLB,,, | Performed by: FAMILY MEDICINE

## 2021-07-07 PROCEDURE — 99999 PR PBB SHADOW E&M-EST. PATIENT-LVL III: CPT | Mod: PBBFAC,,, | Performed by: FAMILY MEDICINE

## 2021-07-07 PROCEDURE — 1159F PR MEDICATION LIST DOCUMENTED IN MEDICAL RECORD: ICD-10-PCS | Mod: S$GLB,,, | Performed by: FAMILY MEDICINE

## 2021-07-07 PROCEDURE — 1126F PR PAIN SEVERITY QUANTIFIED, NO PAIN PRESENT: ICD-10-PCS | Mod: S$GLB,,, | Performed by: FAMILY MEDICINE

## 2021-07-07 PROCEDURE — 3078F DIAST BP <80 MM HG: CPT | Mod: CPTII,S$GLB,, | Performed by: FAMILY MEDICINE

## 2021-07-07 PROCEDURE — 99999 PR PBB SHADOW E&M-EST. PATIENT-LVL III: ICD-10-PCS | Mod: PBBFAC,,, | Performed by: FAMILY MEDICINE

## 2021-07-08 ENCOUNTER — OUTPATIENT CASE MANAGEMENT (OUTPATIENT)
Dept: ADMINISTRATIVE | Facility: OTHER | Age: 66
End: 2021-07-08

## 2021-07-13 ENCOUNTER — HOSPITAL ENCOUNTER (OUTPATIENT)
Dept: RADIOLOGY | Facility: HOSPITAL | Age: 66
Discharge: HOME | End: 2021-07-13
Attending: FAMILY MEDICINE
Payer: MEDICARE

## 2021-07-13 VITALS — HEIGHT: 62 IN | BODY MASS INDEX: 32.05 KG/M2 | WEIGHT: 174.19 LBS

## 2021-07-13 DIAGNOSIS — Z12.39 BREAST SCREENING: ICD-10-CM

## 2021-07-13 DIAGNOSIS — Z12.31 ENCOUNTER FOR SCREENING MAMMOGRAM FOR MALIGNANT NEOPLASM OF BREAST: ICD-10-CM

## 2021-07-13 PROCEDURE — 77067 MAMMO DIGITAL SCREENING BILAT WITH TOMO: ICD-10-PCS | Mod: 26,,, | Performed by: RADIOLOGY

## 2021-07-13 PROCEDURE — 77063 BREAST TOMOSYNTHESIS BI: CPT | Mod: 26,,, | Performed by: RADIOLOGY

## 2021-07-13 PROCEDURE — 77067 SCR MAMMO BI INCL CAD: CPT | Mod: TC

## 2021-07-13 PROCEDURE — 77067 SCR MAMMO BI INCL CAD: CPT | Mod: 26,,, | Performed by: RADIOLOGY

## 2021-07-13 PROCEDURE — 77063 MAMMO DIGITAL SCREENING BILAT WITH TOMO: ICD-10-PCS | Mod: 26,,, | Performed by: RADIOLOGY

## 2021-07-14 DIAGNOSIS — R92.8 ABNORMAL MAMMOGRAM: Primary | ICD-10-CM

## 2021-07-16 ENCOUNTER — TELEPHONE (OUTPATIENT)
Dept: RADIOLOGY | Facility: HOSPITAL | Age: 66
End: 2021-07-16

## 2021-07-19 ENCOUNTER — HOSPITAL ENCOUNTER (OUTPATIENT)
Dept: RADIOLOGY | Facility: HOSPITAL | Age: 66
Discharge: HOME | End: 2021-07-19
Attending: FAMILY MEDICINE
Payer: MEDICARE

## 2021-07-19 DIAGNOSIS — R92.8 ABNORMAL MAMMOGRAM: ICD-10-CM

## 2021-07-19 PROCEDURE — 76642 ULTRASOUND BREAST LIMITED: CPT | Mod: 26,LT,, | Performed by: RADIOLOGY

## 2021-07-19 PROCEDURE — 77061 BREAST TOMOSYNTHESIS UNI: CPT | Mod: TC,LT

## 2021-07-19 PROCEDURE — 77065 MAMMO DIGITAL DIAGNOSTIC LEFT WITH TOMO: ICD-10-PCS | Mod: 26,LT,, | Performed by: RADIOLOGY

## 2021-07-19 PROCEDURE — 77061 BREAST TOMOSYNTHESIS UNI: CPT | Mod: 26,LT,, | Performed by: RADIOLOGY

## 2021-07-19 PROCEDURE — 77065 DX MAMMO INCL CAD UNI: CPT | Mod: 26,LT,, | Performed by: RADIOLOGY

## 2021-07-19 PROCEDURE — 77061 MAMMO DIGITAL DIAGNOSTIC LEFT WITH TOMO: ICD-10-PCS | Mod: 26,LT,, | Performed by: RADIOLOGY

## 2021-07-19 PROCEDURE — 76642 US BREAST LEFT LIMITED: ICD-10-PCS | Mod: 26,LT,, | Performed by: RADIOLOGY

## 2021-07-19 PROCEDURE — 76642 ULTRASOUND BREAST LIMITED: CPT | Mod: TC,LT

## 2021-07-20 ENCOUNTER — TELEPHONE (OUTPATIENT)
Dept: RADIOLOGY | Facility: HOSPITAL | Age: 66
End: 2021-07-20

## 2021-07-20 DIAGNOSIS — R92.8 ABNORMAL MAMMOGRAM: Primary | ICD-10-CM

## 2021-07-20 RX ORDER — TEMAZEPAM 30 MG/1
CAPSULE ORAL
Qty: 90 CAPSULE | Refills: 1 | OUTPATIENT
Start: 2021-07-20

## 2021-07-21 RX ORDER — TEMAZEPAM 30 MG/1
CAPSULE ORAL
Qty: 90 CAPSULE | Refills: 1 | OUTPATIENT
Start: 2021-07-21

## 2021-08-02 ENCOUNTER — TELEPHONE (OUTPATIENT)
Dept: SURGERY | Facility: CLINIC | Age: 66
End: 2021-08-02

## 2021-08-02 ENCOUNTER — OFFICE VISIT (OUTPATIENT)
Dept: SURGERY | Facility: CLINIC | Age: 66
End: 2021-08-02
Payer: MEDICARE

## 2021-08-02 VITALS
TEMPERATURE: 98 F | DIASTOLIC BLOOD PRESSURE: 89 MMHG | WEIGHT: 171.31 LBS | HEIGHT: 62 IN | SYSTOLIC BLOOD PRESSURE: 139 MMHG | BODY MASS INDEX: 31.52 KG/M2 | HEART RATE: 96 BPM | OXYGEN SATURATION: 99 % | RESPIRATION RATE: 16 BRPM

## 2021-08-02 DIAGNOSIS — Z71.89 COUNSELING ON HEALTH PROMOTION AND DISEASE PREVENTION: ICD-10-CM

## 2021-08-02 DIAGNOSIS — Z71.9 HEALTH EDUCATION/COUNSELING: ICD-10-CM

## 2021-08-02 DIAGNOSIS — R92.8 ABNORMAL MAMMOGRAM: Primary | ICD-10-CM

## 2021-08-02 DIAGNOSIS — Z71.89 COUNSELING AND COORDINATION OF CARE: ICD-10-CM

## 2021-08-02 PROCEDURE — 99999 PR PBB SHADOW E&M-EST. PATIENT-LVL IV: ICD-10-PCS | Mod: PBBFAC,,, | Performed by: NURSE PRACTITIONER

## 2021-08-02 PROCEDURE — 3288F PR FALLS RISK ASSESSMENT DOCUMENTED: ICD-10-PCS | Mod: CPTII,S$GLB,, | Performed by: NURSE PRACTITIONER

## 2021-08-02 PROCEDURE — 3044F PR MOST RECENT HEMOGLOBIN A1C LEVEL <7.0%: ICD-10-PCS | Mod: CPTII,S$GLB,, | Performed by: NURSE PRACTITIONER

## 2021-08-02 PROCEDURE — 99499 UNLISTED E&M SERVICE: CPT | Mod: S$GLB,,, | Performed by: NURSE PRACTITIONER

## 2021-08-02 PROCEDURE — 99203 OFFICE O/P NEW LOW 30 MIN: CPT | Mod: S$GLB,,, | Performed by: NURSE PRACTITIONER

## 2021-08-02 PROCEDURE — 3079F PR MOST RECENT DIASTOLIC BLOOD PRESSURE 80-89 MM HG: ICD-10-PCS | Mod: CPTII,S$GLB,, | Performed by: NURSE PRACTITIONER

## 2021-08-02 PROCEDURE — 1126F PR PAIN SEVERITY QUANTIFIED, NO PAIN PRESENT: ICD-10-PCS | Mod: CPTII,S$GLB,, | Performed by: NURSE PRACTITIONER

## 2021-08-02 PROCEDURE — 1160F PR REVIEW ALL MEDS BY PRESCRIBER/CLIN PHARMACIST DOCUMENTED: ICD-10-PCS | Mod: CPTII,S$GLB,, | Performed by: NURSE PRACTITIONER

## 2021-08-02 PROCEDURE — 99999 PR PBB SHADOW E&M-EST. PATIENT-LVL IV: CPT | Mod: PBBFAC,,, | Performed by: NURSE PRACTITIONER

## 2021-08-02 PROCEDURE — 3044F HG A1C LEVEL LT 7.0%: CPT | Mod: CPTII,S$GLB,, | Performed by: NURSE PRACTITIONER

## 2021-08-02 PROCEDURE — 1101F PT FALLS ASSESS-DOCD LE1/YR: CPT | Mod: CPTII,S$GLB,, | Performed by: NURSE PRACTITIONER

## 2021-08-02 PROCEDURE — 3288F FALL RISK ASSESSMENT DOCD: CPT | Mod: CPTII,S$GLB,, | Performed by: NURSE PRACTITIONER

## 2021-08-02 PROCEDURE — 3008F PR BODY MASS INDEX (BMI) DOCUMENTED: ICD-10-PCS | Mod: CPTII,S$GLB,, | Performed by: NURSE PRACTITIONER

## 2021-08-02 PROCEDURE — 3075F PR MOST RECENT SYSTOLIC BLOOD PRESS GE 130-139MM HG: ICD-10-PCS | Mod: CPTII,S$GLB,, | Performed by: NURSE PRACTITIONER

## 2021-08-02 PROCEDURE — 3079F DIAST BP 80-89 MM HG: CPT | Mod: CPTII,S$GLB,, | Performed by: NURSE PRACTITIONER

## 2021-08-02 PROCEDURE — 1159F PR MEDICATION LIST DOCUMENTED IN MEDICAL RECORD: ICD-10-PCS | Mod: CPTII,S$GLB,, | Performed by: NURSE PRACTITIONER

## 2021-08-02 PROCEDURE — 99499 RISK ADDL DX/OHS AUDIT: ICD-10-PCS | Mod: S$GLB,,, | Performed by: NURSE PRACTITIONER

## 2021-08-02 PROCEDURE — 1160F RVW MEDS BY RX/DR IN RCRD: CPT | Mod: CPTII,S$GLB,, | Performed by: NURSE PRACTITIONER

## 2021-08-02 PROCEDURE — 3008F BODY MASS INDEX DOCD: CPT | Mod: CPTII,S$GLB,, | Performed by: NURSE PRACTITIONER

## 2021-08-02 PROCEDURE — 1126F AMNT PAIN NOTED NONE PRSNT: CPT | Mod: CPTII,S$GLB,, | Performed by: NURSE PRACTITIONER

## 2021-08-02 PROCEDURE — 1159F MED LIST DOCD IN RCRD: CPT | Mod: CPTII,S$GLB,, | Performed by: NURSE PRACTITIONER

## 2021-08-02 PROCEDURE — 99203 PR OFFICE/OUTPT VISIT, NEW, LEVL III, 30-44 MIN: ICD-10-PCS | Mod: S$GLB,,, | Performed by: NURSE PRACTITIONER

## 2021-08-02 PROCEDURE — 3075F SYST BP GE 130 - 139MM HG: CPT | Mod: CPTII,S$GLB,, | Performed by: NURSE PRACTITIONER

## 2021-08-02 PROCEDURE — 1101F PR PT FALLS ASSESS DOC 0-1 FALLS W/OUT INJ PAST YR: ICD-10-PCS | Mod: CPTII,S$GLB,, | Performed by: NURSE PRACTITIONER

## 2021-08-03 ENCOUNTER — APPOINTMENT (OUTPATIENT)
Dept: RADIOLOGY | Facility: HOSPITAL | Age: 66
End: 2021-08-03
Attending: FAMILY MEDICINE
Payer: MEDICARE

## 2021-08-03 DIAGNOSIS — M81.0 OSTEOPOROSIS, POST-MENOPAUSAL: ICD-10-CM

## 2021-08-03 PROCEDURE — 77080 DXA BONE DENSITY AXIAL: CPT | Mod: TC

## 2021-08-03 PROCEDURE — 77080 DXA BONE DENSITY AXIAL: CPT | Mod: 26,,, | Performed by: RADIOLOGY

## 2021-08-03 PROCEDURE — 77080 DEXA BONE DENSITY SPINE HIP: ICD-10-PCS | Mod: 26,,, | Performed by: RADIOLOGY

## 2021-08-11 ENCOUNTER — TELEPHONE (OUTPATIENT)
Dept: RADIOLOGY | Facility: HOSPITAL | Age: 66
End: 2021-08-11

## 2021-08-11 ENCOUNTER — HOSPITAL ENCOUNTER (OUTPATIENT)
Dept: RADIOLOGY | Facility: HOSPITAL | Age: 66
Discharge: HOME | End: 2021-08-11
Attending: NURSE PRACTITIONER
Payer: MEDICARE

## 2021-08-11 DIAGNOSIS — R92.8 ABNORMAL MAMMOGRAM: ICD-10-CM

## 2021-08-11 PROCEDURE — 88305 TISSUE EXAM BY PATHOLOGIST: CPT | Mod: 26,,, | Performed by: PATHOLOGY

## 2021-08-11 PROCEDURE — 88342 IMHCHEM/IMCYTCHM 1ST ANTB: CPT | Performed by: PATHOLOGY

## 2021-08-11 PROCEDURE — 88305 TISSUE EXAM BY PATHOLOGIST: ICD-10-PCS | Mod: 26,,, | Performed by: PATHOLOGY

## 2021-08-11 PROCEDURE — 88360 TUMOR IMMUNOHISTOCHEM/MANUAL: CPT | Mod: 26,,, | Performed by: PATHOLOGY

## 2021-08-11 PROCEDURE — 88342 CHG IMMUNOCYTOCHEMISTRY: ICD-10-PCS | Mod: 26,XU,, | Performed by: PATHOLOGY

## 2021-08-11 PROCEDURE — 88360 PR  TUMOR IMMUNOHISTOCHEM/MANUAL: ICD-10-PCS | Mod: 26,,, | Performed by: PATHOLOGY

## 2021-08-11 PROCEDURE — 19081 BX BREAST 1ST LESION STRTCTC: CPT | Mod: LT

## 2021-08-11 PROCEDURE — 88305 TISSUE EXAM BY PATHOLOGIST: CPT | Performed by: PATHOLOGY

## 2021-08-11 PROCEDURE — 88360 TUMOR IMMUNOHISTOCHEM/MANUAL: CPT | Performed by: PATHOLOGY

## 2021-08-11 PROCEDURE — 88342 IMHCHEM/IMCYTCHM 1ST ANTB: CPT | Mod: 26,XU,, | Performed by: PATHOLOGY

## 2021-08-11 PROCEDURE — 19081 BX BREAST 1ST LESION STRTCTC: CPT | Mod: LT,,, | Performed by: RADIOLOGY

## 2021-08-11 PROCEDURE — 19081 MAMMO BREAST STEREOTACTIC BREAST BIOPSY LEFT: ICD-10-PCS | Mod: LT,,, | Performed by: RADIOLOGY

## 2021-08-16 ENCOUNTER — TELEPHONE (OUTPATIENT)
Dept: SURGERY | Facility: CLINIC | Age: 66
End: 2021-08-16

## 2021-08-16 LAB
FINAL PATHOLOGIC DIAGNOSIS: NORMAL
GROSS: NORMAL
Lab: NORMAL

## 2021-08-17 ENCOUNTER — TELEPHONE (OUTPATIENT)
Dept: TRANSPLANT | Facility: CLINIC | Age: 66
End: 2021-08-17

## 2021-08-17 ENCOUNTER — DOCUMENTATION ONLY (OUTPATIENT)
Dept: HEMATOLOGY/ONCOLOGY | Facility: CLINIC | Age: 66
End: 2021-08-17

## 2021-08-17 DIAGNOSIS — D05.12 DUCTAL CARCINOMA IN SITU (DCIS) OF LEFT BREAST: Primary | ICD-10-CM

## 2021-08-19 ENCOUNTER — DOCUMENTATION ONLY (OUTPATIENT)
Dept: HEMATOLOGY/ONCOLOGY | Facility: CLINIC | Age: 66
End: 2021-08-19

## 2021-08-19 ENCOUNTER — LAB VISIT (OUTPATIENT)
Dept: LAB | Facility: HOSPITAL | Age: 66
End: 2021-08-19
Attending: FAMILY MEDICINE
Payer: MEDICARE

## 2021-08-19 DIAGNOSIS — E78.2 MIXED HYPERLIPIDEMIA: ICD-10-CM

## 2021-08-19 DIAGNOSIS — Z94.1 STATUS POST HEART TRANSPLANT: ICD-10-CM

## 2021-08-19 DIAGNOSIS — T86.20 COMPLICATION OF HEART TRANSPLANT, UNSPECIFIED COMPLICATION: ICD-10-CM

## 2021-08-19 DIAGNOSIS — I10 ESSENTIAL HYPERTENSION: ICD-10-CM

## 2021-08-19 DIAGNOSIS — Z79.899 ENCOUNTER FOR LONG-TERM (CURRENT) USE OF MEDICATIONS: ICD-10-CM

## 2021-08-19 DIAGNOSIS — R06.02 SHORTNESS OF BREATH: ICD-10-CM

## 2021-08-19 LAB
ALBUMIN SERPL BCP-MCNC: 3.3 G/DL (ref 3.5–5.2)
ALP SERPL-CCNC: 96 U/L (ref 55–135)
ALT SERPL W/O P-5'-P-CCNC: 19 U/L (ref 10–44)
ANION GAP SERPL CALC-SCNC: 9 MMOL/L (ref 8–16)
AST SERPL-CCNC: 30 U/L (ref 10–40)
BASOPHILS # BLD AUTO: 0.02 K/UL (ref 0–0.2)
BASOPHILS NFR BLD: 0.5 % (ref 0–1.9)
BILIRUB SERPL-MCNC: 0.7 MG/DL (ref 0.1–1)
BNP SERPL-MCNC: 275 PG/ML (ref 0–99)
BUN SERPL-MCNC: 28 MG/DL (ref 8–23)
CALCIUM SERPL-MCNC: 9.8 MG/DL (ref 8.7–10.5)
CHLORIDE SERPL-SCNC: 105 MMOL/L (ref 95–110)
CHOLEST SERPL-MCNC: 195 MG/DL (ref 120–199)
CHOLEST/HDLC SERPL: 3.8 {RATIO} (ref 2–5)
CO2 SERPL-SCNC: 26 MMOL/L (ref 23–29)
CREAT SERPL-MCNC: 2.1 MG/DL (ref 0.5–1.4)
DIFFERENTIAL METHOD: ABNORMAL
EOSINOPHIL # BLD AUTO: 0.1 K/UL (ref 0–0.5)
EOSINOPHIL NFR BLD: 3 % (ref 0–8)
ERYTHROCYTE [DISTWIDTH] IN BLOOD BY AUTOMATED COUNT: 14.6 % (ref 11.5–14.5)
EST. GFR  (AFRICAN AMERICAN): 27.5 ML/MIN/1.73 M^2
EST. GFR  (NON AFRICAN AMERICAN): 23.8 ML/MIN/1.73 M^2
GLUCOSE SERPL-MCNC: 99 MG/DL (ref 70–110)
HCT VFR BLD AUTO: 32.5 % (ref 37–48.5)
HDLC SERPL-MCNC: 52 MG/DL (ref 40–75)
HDLC SERPL: 26.7 % (ref 20–50)
HGB BLD-MCNC: 10.1 G/DL (ref 12–16)
IMM GRANULOCYTES # BLD AUTO: 0.02 K/UL (ref 0–0.04)
IMM GRANULOCYTES NFR BLD AUTO: 0.5 % (ref 0–0.5)
LDLC SERPL CALC-MCNC: 116 MG/DL (ref 63–159)
LYMPHOCYTES # BLD AUTO: 1.6 K/UL (ref 1–4.8)
LYMPHOCYTES NFR BLD: 39.3 % (ref 18–48)
MAGNESIUM SERPL-MCNC: 1.8 MG/DL (ref 1.6–2.6)
MCH RBC QN AUTO: 28.1 PG (ref 27–31)
MCHC RBC AUTO-ENTMCNC: 31.1 G/DL (ref 32–36)
MCV RBC AUTO: 90 FL (ref 82–98)
MONOCYTES # BLD AUTO: 0.5 K/UL (ref 0.3–1)
MONOCYTES NFR BLD: 12.8 % (ref 4–15)
NEUTROPHILS # BLD AUTO: 1.7 K/UL (ref 1.8–7.7)
NEUTROPHILS NFR BLD: 43.9 % (ref 38–73)
NONHDLC SERPL-MCNC: 143 MG/DL
NRBC BLD-RTO: 0 /100 WBC
PLATELET # BLD AUTO: 279 K/UL (ref 150–450)
PMV BLD AUTO: 9.9 FL (ref 9.2–12.9)
POTASSIUM SERPL-SCNC: 5.1 MMOL/L (ref 3.5–5.1)
PROT SERPL-MCNC: 7.8 G/DL (ref 6–8.4)
RBC # BLD AUTO: 3.6 M/UL (ref 4–5.4)
SODIUM SERPL-SCNC: 140 MMOL/L (ref 136–145)
TRIGL SERPL-MCNC: 135 MG/DL (ref 30–150)
WBC # BLD AUTO: 3.97 K/UL (ref 3.9–12.7)

## 2021-08-19 PROCEDURE — 83735 ASSAY OF MAGNESIUM: CPT | Performed by: INTERNAL MEDICINE

## 2021-08-19 PROCEDURE — 80053 COMPREHEN METABOLIC PANEL: CPT | Performed by: INTERNAL MEDICINE

## 2021-08-19 PROCEDURE — 83880 ASSAY OF NATRIURETIC PEPTIDE: CPT | Performed by: INTERNAL MEDICINE

## 2021-08-19 PROCEDURE — 80061 LIPID PANEL: CPT | Performed by: INTERNAL MEDICINE

## 2021-08-19 PROCEDURE — 85025 COMPLETE CBC W/AUTO DIFF WBC: CPT | Performed by: INTERNAL MEDICINE

## 2021-08-19 PROCEDURE — 36415 COLL VENOUS BLD VENIPUNCTURE: CPT | Performed by: INTERNAL MEDICINE

## 2021-08-19 PROCEDURE — 80158 DRUG ASSAY CYCLOSPORINE: CPT | Performed by: INTERNAL MEDICINE

## 2021-08-20 DIAGNOSIS — M79.7 FIBROMYALGIA: ICD-10-CM

## 2021-08-20 LAB — CYCLOSPORINE BLD LC/MS/MS-MCNC: 118 NG/ML (ref 100–400)

## 2021-08-21 RX ORDER — DULOXETIN HYDROCHLORIDE 30 MG/1
CAPSULE, DELAYED RELEASE ORAL
Qty: 90 CAPSULE | Refills: 1 | Status: SHIPPED | OUTPATIENT
Start: 2021-08-21 | End: 2022-09-16 | Stop reason: SDUPTHER

## 2021-08-31 ENCOUNTER — OFFICE VISIT (OUTPATIENT)
Dept: HEMATOLOGY/ONCOLOGY | Facility: CLINIC | Age: 66
End: 2021-08-31
Payer: MEDICARE

## 2021-08-31 ENCOUNTER — TELEPHONE (OUTPATIENT)
Dept: HEMATOLOGY/ONCOLOGY | Facility: CLINIC | Age: 66
End: 2021-08-31

## 2021-08-31 VITALS
BODY MASS INDEX: 31.64 KG/M2 | HEART RATE: 101 BPM | WEIGHT: 171.94 LBS | SYSTOLIC BLOOD PRESSURE: 158 MMHG | OXYGEN SATURATION: 98 % | HEIGHT: 62 IN | DIASTOLIC BLOOD PRESSURE: 79 MMHG | TEMPERATURE: 98 F

## 2021-08-31 DIAGNOSIS — E66.9 OBESITY (BMI 30.0-34.9): ICD-10-CM

## 2021-08-31 DIAGNOSIS — N18.4 CKD (CHRONIC KIDNEY DISEASE) STAGE 4, GFR 15-29 ML/MIN: ICD-10-CM

## 2021-08-31 DIAGNOSIS — I10 ESSENTIAL HYPERTENSION: ICD-10-CM

## 2021-08-31 DIAGNOSIS — D05.12 DUCTAL CARCINOMA IN SITU (DCIS) OF LEFT BREAST: Primary | ICD-10-CM

## 2021-08-31 DIAGNOSIS — D84.821 IMMUNOSUPPRESSION DUE TO DRUG THERAPY: ICD-10-CM

## 2021-08-31 DIAGNOSIS — Z94.1 HEART TRANSPLANTED: Chronic | ICD-10-CM

## 2021-08-31 DIAGNOSIS — I70.0 AORTIC ATHEROSCLEROSIS: ICD-10-CM

## 2021-08-31 DIAGNOSIS — Z79.899 IMMUNOSUPPRESSION DUE TO DRUG THERAPY: ICD-10-CM

## 2021-08-31 PROBLEM — Z91.89 AT HIGH RISK FOR BREAST CANCER: Status: RESOLVED | Noted: 2018-07-18 | Resolved: 2021-08-31

## 2021-08-31 PROCEDURE — 3008F PR BODY MASS INDEX (BMI) DOCUMENTED: ICD-10-PCS | Mod: CPTII,S$GLB,, | Performed by: INTERNAL MEDICINE

## 2021-08-31 PROCEDURE — 99499 UNLISTED E&M SERVICE: CPT | Mod: S$GLB,,, | Performed by: INTERNAL MEDICINE

## 2021-08-31 PROCEDURE — 1159F MED LIST DOCD IN RCRD: CPT | Mod: CPTII,S$GLB,, | Performed by: INTERNAL MEDICINE

## 2021-08-31 PROCEDURE — 3008F BODY MASS INDEX DOCD: CPT | Mod: CPTII,S$GLB,, | Performed by: INTERNAL MEDICINE

## 2021-08-31 PROCEDURE — 99499 RISK ADDL DX/OHS AUDIT: ICD-10-PCS | Mod: S$GLB,,, | Performed by: INTERNAL MEDICINE

## 2021-08-31 PROCEDURE — 1101F PT FALLS ASSESS-DOCD LE1/YR: CPT | Mod: CPTII,S$GLB,, | Performed by: INTERNAL MEDICINE

## 2021-08-31 PROCEDURE — 1159F PR MEDICATION LIST DOCUMENTED IN MEDICAL RECORD: ICD-10-PCS | Mod: CPTII,S$GLB,, | Performed by: INTERNAL MEDICINE

## 2021-08-31 PROCEDURE — 3288F PR FALLS RISK ASSESSMENT DOCUMENTED: ICD-10-PCS | Mod: CPTII,S$GLB,, | Performed by: INTERNAL MEDICINE

## 2021-08-31 PROCEDURE — 99214 PR OFFICE/OUTPT VISIT, EST, LEVL IV, 30-39 MIN: ICD-10-PCS | Mod: S$GLB,,, | Performed by: INTERNAL MEDICINE

## 2021-08-31 PROCEDURE — 3288F FALL RISK ASSESSMENT DOCD: CPT | Mod: CPTII,S$GLB,, | Performed by: INTERNAL MEDICINE

## 2021-08-31 PROCEDURE — 1160F PR REVIEW ALL MEDS BY PRESCRIBER/CLIN PHARMACIST DOCUMENTED: ICD-10-PCS | Mod: CPTII,S$GLB,, | Performed by: INTERNAL MEDICINE

## 2021-08-31 PROCEDURE — 1126F AMNT PAIN NOTED NONE PRSNT: CPT | Mod: CPTII,S$GLB,, | Performed by: INTERNAL MEDICINE

## 2021-08-31 PROCEDURE — 3078F PR MOST RECENT DIASTOLIC BLOOD PRESSURE < 80 MM HG: ICD-10-PCS | Mod: CPTII,S$GLB,, | Performed by: INTERNAL MEDICINE

## 2021-08-31 PROCEDURE — 1126F PR PAIN SEVERITY QUANTIFIED, NO PAIN PRESENT: ICD-10-PCS | Mod: CPTII,S$GLB,, | Performed by: INTERNAL MEDICINE

## 2021-08-31 PROCEDURE — 3044F PR MOST RECENT HEMOGLOBIN A1C LEVEL <7.0%: ICD-10-PCS | Mod: CPTII,S$GLB,, | Performed by: INTERNAL MEDICINE

## 2021-08-31 PROCEDURE — 99999 PR PBB SHADOW E&M-EST. PATIENT-LVL III: CPT | Mod: PBBFAC,,, | Performed by: INTERNAL MEDICINE

## 2021-08-31 PROCEDURE — 3044F HG A1C LEVEL LT 7.0%: CPT | Mod: CPTII,S$GLB,, | Performed by: INTERNAL MEDICINE

## 2021-08-31 PROCEDURE — 3077F SYST BP >= 140 MM HG: CPT | Mod: CPTII,S$GLB,, | Performed by: INTERNAL MEDICINE

## 2021-08-31 PROCEDURE — 1101F PR PT FALLS ASSESS DOC 0-1 FALLS W/OUT INJ PAST YR: ICD-10-PCS | Mod: CPTII,S$GLB,, | Performed by: INTERNAL MEDICINE

## 2021-08-31 PROCEDURE — 1160F RVW MEDS BY RX/DR IN RCRD: CPT | Mod: CPTII,S$GLB,, | Performed by: INTERNAL MEDICINE

## 2021-08-31 PROCEDURE — 3077F PR MOST RECENT SYSTOLIC BLOOD PRESSURE >= 140 MM HG: ICD-10-PCS | Mod: CPTII,S$GLB,, | Performed by: INTERNAL MEDICINE

## 2021-08-31 PROCEDURE — 3078F DIAST BP <80 MM HG: CPT | Mod: CPTII,S$GLB,, | Performed by: INTERNAL MEDICINE

## 2021-08-31 PROCEDURE — 99999 PR PBB SHADOW E&M-EST. PATIENT-LVL III: ICD-10-PCS | Mod: PBBFAC,,, | Performed by: INTERNAL MEDICINE

## 2021-08-31 PROCEDURE — 99214 OFFICE O/P EST MOD 30 MIN: CPT | Mod: S$GLB,,, | Performed by: INTERNAL MEDICINE

## 2021-09-01 ENCOUNTER — OFFICE VISIT (OUTPATIENT)
Dept: RADIATION ONCOLOGY | Facility: CLINIC | Age: 66
End: 2021-09-01
Payer: MEDICARE

## 2021-09-01 ENCOUNTER — OFFICE VISIT (OUTPATIENT)
Dept: SURGERY | Facility: CLINIC | Age: 66
End: 2021-09-01
Payer: MEDICARE

## 2021-09-01 VITALS
HEART RATE: 98 BPM | DIASTOLIC BLOOD PRESSURE: 87 MMHG | SYSTOLIC BLOOD PRESSURE: 149 MMHG | HEIGHT: 62 IN | WEIGHT: 171.5 LBS | TEMPERATURE: 98 F | BODY MASS INDEX: 31.56 KG/M2

## 2021-09-01 VITALS
WEIGHT: 172.38 LBS | HEIGHT: 62 IN | OXYGEN SATURATION: 98 % | RESPIRATION RATE: 18 BRPM | TEMPERATURE: 98 F | BODY MASS INDEX: 31.72 KG/M2 | HEART RATE: 99 BPM | SYSTOLIC BLOOD PRESSURE: 138 MMHG | DIASTOLIC BLOOD PRESSURE: 81 MMHG

## 2021-09-01 DIAGNOSIS — D05.12 DUCTAL CARCINOMA IN SITU (DCIS) OF LEFT BREAST: Primary | ICD-10-CM

## 2021-09-01 DIAGNOSIS — D05.12 DUCTAL CARCINOMA IN SITU (DCIS) OF LEFT BREAST: ICD-10-CM

## 2021-09-01 DIAGNOSIS — Z01.818 PRE-OP TESTING: ICD-10-CM

## 2021-09-01 PROCEDURE — 1159F PR MEDICATION LIST DOCUMENTED IN MEDICAL RECORD: ICD-10-PCS | Mod: CPTII,S$GLB,, | Performed by: SURGERY

## 2021-09-01 PROCEDURE — 3066F NEPHROPATHY DOC TX: CPT | Mod: CPTII,S$GLB,, | Performed by: SURGERY

## 2021-09-01 PROCEDURE — 99999 PR PBB SHADOW E&M-EST. PATIENT-LVL V: ICD-10-PCS | Mod: PBBFAC,,, | Performed by: SURGERY

## 2021-09-01 PROCEDURE — 1126F PR PAIN SEVERITY QUANTIFIED, NO PAIN PRESENT: ICD-10-PCS | Mod: CPTII,S$GLB,, | Performed by: RADIOLOGY

## 2021-09-01 PROCEDURE — 3066F PR DOCUMENTATION OF TREATMENT FOR NEPHROPATHY: ICD-10-PCS | Mod: CPTII,S$GLB,, | Performed by: SURGERY

## 2021-09-01 PROCEDURE — 1159F MED LIST DOCD IN RCRD: CPT | Mod: CPTII,S$GLB,, | Performed by: RADIOLOGY

## 2021-09-01 PROCEDURE — 1126F PR PAIN SEVERITY QUANTIFIED, NO PAIN PRESENT: ICD-10-PCS | Mod: CPTII,S$GLB,, | Performed by: SURGERY

## 2021-09-01 PROCEDURE — 1159F PR MEDICATION LIST DOCUMENTED IN MEDICAL RECORD: ICD-10-PCS | Mod: CPTII,S$GLB,, | Performed by: RADIOLOGY

## 2021-09-01 PROCEDURE — 3008F BODY MASS INDEX DOCD: CPT | Mod: CPTII,S$GLB,, | Performed by: SURGERY

## 2021-09-01 PROCEDURE — 3079F PR MOST RECENT DIASTOLIC BLOOD PRESSURE 80-89 MM HG: ICD-10-PCS | Mod: CPTII,S$GLB,, | Performed by: RADIOLOGY

## 2021-09-01 PROCEDURE — 3075F PR MOST RECENT SYSTOLIC BLOOD PRESS GE 130-139MM HG: ICD-10-PCS | Mod: CPTII,S$GLB,, | Performed by: RADIOLOGY

## 2021-09-01 PROCEDURE — 3008F PR BODY MASS INDEX (BMI) DOCUMENTED: ICD-10-PCS | Mod: CPTII,S$GLB,, | Performed by: RADIOLOGY

## 2021-09-01 PROCEDURE — 4010F ACE/ARB THERAPY RXD/TAKEN: CPT | Mod: CPTII,S$GLB,, | Performed by: SURGERY

## 2021-09-01 PROCEDURE — 1126F AMNT PAIN NOTED NONE PRSNT: CPT | Mod: CPTII,S$GLB,, | Performed by: SURGERY

## 2021-09-01 PROCEDURE — 3044F PR MOST RECENT HEMOGLOBIN A1C LEVEL <7.0%: ICD-10-PCS | Mod: CPTII,S$GLB,, | Performed by: SURGERY

## 2021-09-01 PROCEDURE — 3079F DIAST BP 80-89 MM HG: CPT | Mod: CPTII,S$GLB,, | Performed by: SURGERY

## 2021-09-01 PROCEDURE — 3008F BODY MASS INDEX DOCD: CPT | Mod: CPTII,S$GLB,, | Performed by: RADIOLOGY

## 2021-09-01 PROCEDURE — 99214 PR OFFICE/OUTPT VISIT, EST, LEVL IV, 30-39 MIN: ICD-10-PCS | Mod: S$GLB,,, | Performed by: SURGERY

## 2021-09-01 PROCEDURE — 3044F PR MOST RECENT HEMOGLOBIN A1C LEVEL <7.0%: ICD-10-PCS | Mod: CPTII,S$GLB,, | Performed by: RADIOLOGY

## 2021-09-01 PROCEDURE — 1160F RVW MEDS BY RX/DR IN RCRD: CPT | Mod: CPTII,S$GLB,, | Performed by: SURGERY

## 2021-09-01 PROCEDURE — 3075F SYST BP GE 130 - 139MM HG: CPT | Mod: CPTII,S$GLB,, | Performed by: RADIOLOGY

## 2021-09-01 PROCEDURE — 99205 PR OFFICE/OUTPT VISIT, NEW, LEVL V, 60-74 MIN: ICD-10-PCS | Mod: S$GLB,,, | Performed by: RADIOLOGY

## 2021-09-01 PROCEDURE — 3288F PR FALLS RISK ASSESSMENT DOCUMENTED: ICD-10-PCS | Mod: CPTII,S$GLB,, | Performed by: SURGERY

## 2021-09-01 PROCEDURE — 3044F HG A1C LEVEL LT 7.0%: CPT | Mod: CPTII,S$GLB,, | Performed by: SURGERY

## 2021-09-01 PROCEDURE — 1101F PR PT FALLS ASSESS DOC 0-1 FALLS W/OUT INJ PAST YR: ICD-10-PCS | Mod: CPTII,S$GLB,, | Performed by: SURGERY

## 2021-09-01 PROCEDURE — 3288F FALL RISK ASSESSMENT DOCD: CPT | Mod: CPTII,S$GLB,, | Performed by: SURGERY

## 2021-09-01 PROCEDURE — 99499 RISK ADDL DX/OHS AUDIT: ICD-10-PCS | Mod: HCNC,S$GLB,, | Performed by: SURGERY

## 2021-09-01 PROCEDURE — 99499 UNLISTED E&M SERVICE: CPT | Mod: HCNC,S$GLB,, | Performed by: SURGERY

## 2021-09-01 PROCEDURE — 3079F PR MOST RECENT DIASTOLIC BLOOD PRESSURE 80-89 MM HG: ICD-10-PCS | Mod: CPTII,S$GLB,, | Performed by: SURGERY

## 2021-09-01 PROCEDURE — 1160F PR REVIEW ALL MEDS BY PRESCRIBER/CLIN PHARMACIST DOCUMENTED: ICD-10-PCS | Mod: CPTII,S$GLB,, | Performed by: SURGERY

## 2021-09-01 PROCEDURE — 3077F PR MOST RECENT SYSTOLIC BLOOD PRESSURE >= 140 MM HG: ICD-10-PCS | Mod: CPTII,S$GLB,, | Performed by: SURGERY

## 2021-09-01 PROCEDURE — 99999 PR PBB SHADOW E&M-EST. PATIENT-LVL III: ICD-10-PCS | Mod: PBBFAC,,, | Performed by: RADIOLOGY

## 2021-09-01 PROCEDURE — 3008F PR BODY MASS INDEX (BMI) DOCUMENTED: ICD-10-PCS | Mod: CPTII,S$GLB,, | Performed by: SURGERY

## 2021-09-01 PROCEDURE — 99999 PR PBB SHADOW E&M-EST. PATIENT-LVL V: CPT | Mod: PBBFAC,,, | Performed by: SURGERY

## 2021-09-01 PROCEDURE — 99999 PR PBB SHADOW E&M-EST. PATIENT-LVL III: CPT | Mod: PBBFAC,,, | Performed by: RADIOLOGY

## 2021-09-01 PROCEDURE — 3044F HG A1C LEVEL LT 7.0%: CPT | Mod: CPTII,S$GLB,, | Performed by: RADIOLOGY

## 2021-09-01 PROCEDURE — 3077F SYST BP >= 140 MM HG: CPT | Mod: CPTII,S$GLB,, | Performed by: SURGERY

## 2021-09-01 PROCEDURE — 99214 OFFICE O/P EST MOD 30 MIN: CPT | Mod: S$GLB,,, | Performed by: SURGERY

## 2021-09-01 PROCEDURE — 99205 OFFICE O/P NEW HI 60 MIN: CPT | Mod: S$GLB,,, | Performed by: RADIOLOGY

## 2021-09-01 PROCEDURE — 4010F PR ACE/ARB THEARPY RXD/TAKEN: ICD-10-PCS | Mod: CPTII,S$GLB,, | Performed by: SURGERY

## 2021-09-01 PROCEDURE — 1159F MED LIST DOCD IN RCRD: CPT | Mod: CPTII,S$GLB,, | Performed by: SURGERY

## 2021-09-01 PROCEDURE — 1126F AMNT PAIN NOTED NONE PRSNT: CPT | Mod: CPTII,S$GLB,, | Performed by: RADIOLOGY

## 2021-09-01 PROCEDURE — 3079F DIAST BP 80-89 MM HG: CPT | Mod: CPTII,S$GLB,, | Performed by: RADIOLOGY

## 2021-09-01 PROCEDURE — 1101F PT FALLS ASSESS-DOCD LE1/YR: CPT | Mod: CPTII,S$GLB,, | Performed by: SURGERY

## 2021-09-01 RX ORDER — LIDOCAINE HYDROCHLORIDE 10 MG/ML
1 INJECTION, SOLUTION EPIDURAL; INFILTRATION; INTRACAUDAL; PERINEURAL ONCE
Status: CANCELLED | OUTPATIENT
Start: 2021-09-01 | End: 2021-09-01

## 2021-09-01 RX ORDER — SODIUM CHLORIDE 9 MG/ML
INJECTION, SOLUTION INTRAVENOUS CONTINUOUS
Status: CANCELLED | OUTPATIENT
Start: 2021-09-01

## 2021-09-01 NOTE — PATIENT INSTRUCTIONS
Understanding Trochanteric Bursitis    A bursa is a thin, slippery, sac-like film. It contains a small amount of fluid. This structure is found between bones and soft tissues in and around joints. A bursa cushions and protects a joint. It keeps parts of a joint from rubbing against each other. If a bursa becomes inflamed and irritated, it is known as bursitis.  The trochanteric bursa is found on the hip joint. It lies on top of the bump at the top of the thighbone called the greater trochanter. Inflammation of this bursa is called trochanteric bursitis.     How to say it  [id number]   Causes of trochanteric bursitis  Causes may include:  · Overuse of the hip during running or other sports, dance, or work  · Falling on or irritation to the side of the hip  This condition may occur along with other problems, such as osteoarthritis of the hip or knee, or low back problems. In rare cases, it may occur after hip surgery.  Symptoms of trochanteric bursitis  · Pain or aching on the side of the hip. The pain may travel down the leg.  · Swelling, tenderness, or warmth on the side of the hip at the bony bump at the top of the thigh  Treatment for trochanteric bursitis  These may include:  · Resting the hip. This allows the bursa to heal.  · Prescription or over-the-counter pain medicines. These help reduce inflammation, swelling, and pain.  · Cold packs and heat packs. These help reduce pain and swelling.  · Stretching and strengthening exercises. These improve flexibility and strength around the hip.  · Physical therapy. This includes exercises or other treatments.  · Injections of medicine into the bursa. This may help reduce inflammation and relieve symptoms.  Possible complications  If you dont give your hip time to heal, the problem may not go away, may return, or may get worse. Rest and treat your hip as directed.     When to call your healthcare provider  Call your healthcare provider right away if you have  any of these:  · Fever of 100.4°F (38°C) or higher, or as directed  · Redness, swelling, or warmth that gets worse  · Symptoms that dont get better with prescribed medicines, or get worse  · New symptoms   Date Last Reviewed: 3/29/2016  © 9255-5624 [employer]. [address] [place] [address]. All rights reserved. This information is not intended as a substitute for professional medical care. Always follow your healthcare professional's instructions.        Side Lying Hip Abduction (Strength)    1. Lie down on the floor on your side. Rest your head on your arm. Bend your legs at the knees.  2. Keep your feet together and lift your top leg up so that your knees are . Keep your hips steady.     3. Slowly lower your leg back down.  4. Repeat 10 times, or as instructed.  5. Switch sides if instructed.     Challenge yourself  Put an elastic band or tubing around your thighs. Raise and lower your top leg slowly and steadily.      Date Last Reviewed: 3/29/2016  © 5411-6706 [employer]. [address] [place] [address]. All rights reserved. This information is not intended as a substitute for professional medical care. Always follow your healthcare professional's instructions.        Iliotibial Band Stretch (Flexibility)    6. Stand next to a chair. Hold onto the chair with your right hand for support. Cross your right leg behind your left leg.  7. Lean your right hip toward the right. Feel the stretch at the outside of your hip.  8. Hold for 30 to 60 seconds. Then relax.  9. Repeat 2 times, or as instructed.  10. Switch sides and repeat.  11. Do this 3 times a day, or as instructed.     Tip: Dont bend forward or twist at the waist.   Date Last Reviewed: 3/29/2016  © 0794-2076 [employer]. [address] [place] [address]. All rights reserved. This information is not intended as a substitute for professional medical care. Always follow your  healthcare professional's instructions.        Living with Osteoporosis: Preventing Fractures  If you have osteoporosis, you can do a lot to reduce its effect on your life. Knowing how to prevent fractures and spinal curvature can help you live more comfortably and safely with this disease.    Reducing your risk for fractures  The most common fracture sites in people with osteoporosis are the wrist, spine, and hip. These fractures are often caused by accidents and falls. All fractures are painful and may limit what you can do. But hip fractures are very serious. They need surgery, and it can take months to recover. To reduce your risk for fractures:  · Get regular exercise. Try walking, swimming, or weight training.  · Eat foods that are rich in calcium, or take calcium supplements.  · Make your home safe to help avoid accidents.  · Take extra precautions not to fall in risky areas, such as icy sidewalks.  Understanding spinal fractures  Your spine is made up of many bones called vertebrae. Osteoporosis can cause the vertebrae in your spine to collapse. As a result, your upper back may arch forward, creating a curvature. Spine fractures may also result from back strain and bad posture. You will also lose height. Your lower spine must then adjust to keep your body balanced. This can cause back pain. To prevent or lessen these spinal changes:  · Practice good posture.  · Use proper techniques if you need to lift heavy objects.  · Do back exercises to help your posture.  · Lie on your back when you have pain.  · Ask your healthcare provider about these and other ways to help your spine.  Date Last Reviewed: 10/17/2015  © 1384-6489 [employer]. [address] [place] [address], [place] [address]. All rights reserved. This information is not intended as a substitute for professional medical care. Always follow your healthcare professional's instructions.        Living with Osteoporosis: Regular Exercise  If you have  osteoporosis, exercise is vital for your health. It can prevent bone fractures and spine changes. It will slow bone loss. Exercise will strengthen your body. It can also be fun. A variety of exercises is best. See below for exercises that can help you. Before you start, though, talk to your healthcare provider to be sure these exercises are right for you.    Resistance exercises. These build muscle strength and maintain bone mass. They also make you less prone to injury. Exercises include lifting small weights, doing push-ups and sit-ups, using elastic exercise bands, and using weight machines.  Weight-bearing activities. These help your whole body. They also help you maintain bone mass. Activities include walking, dancing, and housework.  Nonweight-bearing exercises. These help prevent back strain and pain. They do this by building the trunk and leg muscles. Exercises that help with flexibility can prevent falls. Examples include swimming, water exercise, and stretching.  Staying safe  Here are tips to stay safe:   · Always check with your healthcare provider before starting any new exercise program.  · Use weights only as instructed.  · Stop any exercise that causes pain.   Date Last Reviewed: 10/17/2015  © 3169-6192 [employer]. [address] [place], [place] [id number]. All rights reserved. This information is not intended as a substitute for professional medical care. Always follow your healthcare professional's instructions.        Preventing Osteoporosis: Meeting Your Calcium Needs    Your body needs calcium to build and repair bones. But it can't make calcium on its own. That's why it's important to eat calcium-rich foods. Some foods are naturally rich in calcium. Others have calcium added (fortified). It's best to get calcium from the foods you eat. But if you can't get enough, you may want to take calcium supplements. To meet your daily calcium needs, try the foods listed below.  Dairy Fish &  beans Other sources      Source   Calcium (mg) per serving   Source   Calcium (mg) per serving   Source   Calcium (mg) per serving      Low-fat yogurt, plain   415 mg/8 oz.   Sardines, Atlantic, canned, with bones   351 mg/3 oz.   Oatmeal, instant, fortified   215 mg/1 cup   Nonfat milk   302 mg/1 cup   [place], sockeye, canned, with bones   239 mg/3 oz.   Tofu made with calcium sulfate   204 mg/3 oz.   Low-fat milk   297 mg/1 cup   Soybeans, fresh, boiled   131 mg/1/2 cup   Collards   179 mg/1/2 cup   Swiss cheese   272 mg/1 oz.   White beans, cooked   81 mg/1/2 cup   English muffin, whole wheat   175 mg/1 muffin   Cheddar cheese   205 mg/1 oz.   Navy beans, cooked   79 mg/1/2 cup   Kale   90 mg/1/2 cup   Ice cream strawberry   79 mg/1/2 cup           Orange, navel   56 mg/1 medium   Note: Calcium levels may vary depending on brand and size.  Daily calcium needs  14-18 years old: 1,300 mg  19-30 years old: 1,000 mg  31-50 years old: 1,000 mg  51-70 years old, women: 1,200 mg  51-70 years old, men: 1,000 mg  Pregnant or nursin-28 years old: 1,300 mg, 19-50 years old: 1,000 mg  Older than 70 (women and men): 1,200 mg   Date Last Reviewed: 10/17/2015  © 3497-5546 [employer]. [address] [place] [address]. All rights reserved. This information is not intended as a substitute for professional medical care. Always follow your healthcare professional's instructions.        Preventing Osteoporosis: Avoiding Bone Loss  Certain factors can speed up bone loss or decrease bone growth. For example, alcohol, cigarettes, and certain medicines reduce bone mass. Some foods make it hard for your body to absorb calcium.    Things to avoid  Here are things to avoid to help prevent osteoporosis:  · Alcohol is toxic to bones. It is a major cause of bone loss. Heavy drinking can cause osteoporosis even if you have no other risk factors.  · Smoking reduces bone mass. Smoking may also interfere with estrogen  levels and cause early menopause.  · Inactivity makes your bones lose strength and become thinner. Over time, thin bones may break. Women who aren't active are at a high risk for osteoporosis.  · Certain medicines, such as cortisone, increase bone loss. They also decrease bone growth. Ask your healthcare provider about any side effects of your medicines, and how to prevent them.  · Protein-rich or salty foods eaten in large amounts may deplete calcium.  · Caffeine increases calcium loss. People who drink a lot of coffee, tea, or glenn lose more calcium than those who don't.  Date Last Reviewed: 10/17/2015  © 8125-5073 [employer]. [address] [place] [address]. All rights reserved. This information is not intended as a substitute for professional medical care. Always follow your healthcare professional's instructions.        Preventing Osteoporosis: Staying Active  Certain factors can speed up bone loss or decrease bone growth. For example, a lack of activity makes bones lose their strength. Exercise plays a big part in maintaining bone mass, no matter what your age. The amount and type of activity you do also play a part in keeping your bones strong. Weight-bearing and resistance exercises, such as walking, aerobic dancing, and bicycling, are just a few of the activities that are good for your bones.     Resistance exercises, such as weight training, help maintain bones by strengthening the muscles around them. Swimming is also a good choice.     · Check with your healthcare provider before starting any new exercise program.  · Stop any exercise that causes pain.   Date Last Reviewed: 10/17/2015  © 5794-8548 [employer]. [address] [place] [address]. All rights reserved. This information is not intended as a substitute for professional medical care. Always follow your healthcare professional's instructions.        What Is Osteoporosis?  Osteoporosis is a disease  that weakens the bones. Weakened bones are more likely to fracture (break). Osteoporosis affects men and women, but postmenopausal women are most at risk. To help prevent osteoporosis, you need to exercise and nourish your bones throughout your life.    Childhood  The body builds the most bone during these years. That's why boys and girls need foods rich in calcium. They also need plenty of exercise. A healthy diet and exercise helps bones grow strong.  Young adulthood to age 30  During young adulthood, bones become their strongest. This is called peak bone mass. The same good habits that kept bones healthy in childhood help keep bone healthy in adulthood.  Age 30 to menopause  Bone mass declines slightly during these years. Your body makes just enough new bone to maintain peak bone mass. To keep your bones at their peak mass, be sure to exercise and get plenty of calcium.  After menopause  Menopause is when a woman stops having monthly periods. After menopause, the body makes less estrogen (female hormone). This increases bone loss. At this point, treatment may be needed to reduce the risk for fracture. Exercise and calcium can also help keep your bones strong.  Later in life  In later years, both men and women need to take extra care of their bones. By this point, the body loses more bone than it makes. If too much bone is lost, you may be at risk for fractures. With age, the quality and quantity of bone declines. You can lessen bone loss by staying active and increasing your calcium intake. Calcium supplements and other osteoporosis treatments do have risks, so talk to your healthcare provider if you have concerns. If you have osteoporosis, you can also learn ways to increase everyday safety.  Date Last Reviewed: 10/17/2015  ©2007 [employer]. [address] [place] [address], [place] [address]. All Rights reserved. This information is not intended as a substitute for professional medical care. Always follow  your healthcare providers instructions.        Denosumab injection  What is this medicine?  DENOSUMAB (den oh rosaura mab) slows bone breakdown. Prolia is used to treat osteoporosis in women after menopause and in men. Xgeva is used to prevent bone fractures and other bone problems caused by cancer bone metastases. Xgeva is also used to treat giant cell tumor of the bone.  How should I use this medicine?  This medicine is for injection under the skin. It is given by a health care professional in a hospital or clinic setting.  If you are getting Prolia, a special MedGuide will be given to you by the pharmacist with each prescription and refill. Be sure to read this information carefully each time.  For Prolia, talk to your pediatrician regarding the use of this medicine in children. Special care may be needed. For Xgeva, talk to your pediatrician regarding the use of this medicine in children. While this drug may be prescribed for children as young as 13 years for selected conditions, precautions do apply.  What side effects may I notice from receiving this medicine?  Side effects that you should report to your doctor or health care professional as soon as possible:  · allergic reactions like skin rash, itching or hives, swelling of the face, lips, or tongue  · breathing problems  · chest pain  · fast, irregular heartbeat  · feeling faint or lightheaded, falls  · fever, chills, or any other sign of infection  · muscle spasms, tightening, or twitches  · numbness or tingling  · skin blisters or bumps, or is dry, peels, or red  · slow healing or unexplained pain in the mouth or jaw  · unusual bleeding or bruising  Side effects that usually do not require medical attention (Report these to your doctor or health care professional if they continue or are bothersome.):  · muscle pain  · stomach upset, gas  What may interact with this medicine?  Do not take this medicine with any of the following medications:  · other medicines  containing denosumab  This medicine may also interact with the following medications:  · medicines that suppress the immune system  · medicines that treat cancer  · steroid medicines like prednisone or cortisone  What if I miss a dose?  It is important not to miss your dose. Call your doctor or health care professional if you are unable to keep an appointment.  Where should I keep my medicine?  This medicine is only given in a clinic, doctor's office, or other health care setting and will not be stored at home.  What should I tell my health care provider before I take this medicine?  They need to know if you have any of these conditions:  · dental disease  · eczema  · infection or history of infections  · kidney disease or on dialysis  · low blood calcium or vitamin D  · malabsorption syndrome  · scheduled to have surgery or tooth extraction  · taking medicine that contains denosumab  · thyroid or parathyroid disease  · an unusual reaction to denosumab, other medicines, foods, dyes, or preservatives  · pregnant or trying to get pregnant  · breast-feeding  What should I watch for while using this medicine?  Visit your doctor or health care professional for regular checks on your progress. Your doctor or health care professional may order blood tests and other tests to see how you are doing.  Call your doctor or health care professional if you get a cold or other infection while receiving this medicine. Do not treat yourself. This medicine may decrease your body's ability to fight infection.  You should make sure you get enough calcium and vitamin D while you are taking this medicine, unless your doctor tells you not to. Discuss the foods you eat and the vitamins you take with your health care professional.  See your dentist regularly. Brush and floss your teeth as directed. Before you have any dental work done, tell your dentist you are receiving this medicine.  Do not become pregnant while taking this medicine or  for 5 months after stopping it. Women should inform their doctor if they wish to become pregnant or think they might be pregnant. There is a potential for serious side effects to an unborn child. Talk to your health care professional or pharmacist for more information.  NOTE:This sheet is a summary. It may not cover all possible information. If you have questions about this medicine, talk to your doctor, pharmacist, or health care provider. Copyright© 2017 [employer]         post procedure radiography not performed

## 2021-09-03 ENCOUNTER — HOSPITAL ENCOUNTER (OUTPATIENT)
Dept: RADIOLOGY | Facility: HOSPITAL | Age: 66
Discharge: HOME | End: 2021-09-03
Attending: SURGERY
Payer: MEDICARE

## 2021-09-03 ENCOUNTER — HOSPITAL ENCOUNTER (OUTPATIENT)
Dept: CARDIOLOGY | Facility: HOSPITAL | Age: 66
Discharge: HOME | End: 2021-09-03
Attending: SURGERY
Payer: MEDICARE

## 2021-09-03 DIAGNOSIS — D05.12 DUCTAL CARCINOMA IN SITU (DCIS) OF LEFT BREAST: ICD-10-CM

## 2021-09-03 PROCEDURE — 19282 PERQ DEVICE BREAST EA IMAG: CPT | Mod: LT,,, | Performed by: RADIOLOGY

## 2021-09-03 PROCEDURE — 19282 PERQ DEVICE BREAST EA IMAG: CPT | Mod: LT

## 2021-09-03 PROCEDURE — 93010 ELECTROCARDIOGRAM REPORT: CPT | Mod: ,,, | Performed by: INTERNAL MEDICINE

## 2021-09-03 PROCEDURE — 93010 EKG 12-LEAD: ICD-10-PCS | Mod: ,,, | Performed by: INTERNAL MEDICINE

## 2021-09-03 PROCEDURE — 19281 PERQ DEVICE BREAST 1ST IMAG: CPT | Mod: LT

## 2021-09-03 PROCEDURE — 19282 PR PERQ PLCMNT DEV, EA ADDL LES W/MAMMOGRPH GUIDE: ICD-10-PCS | Mod: LT,,, | Performed by: RADIOLOGY

## 2021-09-03 PROCEDURE — A4648 IMPLANTABLE TISSUE MARKER: HCPCS

## 2021-09-03 PROCEDURE — 19281 PERQ DEVICE BREAST 1ST IMAG: CPT | Mod: LT,,, | Performed by: RADIOLOGY

## 2021-09-03 PROCEDURE — 19281 MAMMO BREAST RADAR REFLECTOR LOC W/MAMMO GUIDANCE, 1ST LESION, LEFT: ICD-10-PCS | Mod: LT,,, | Performed by: RADIOLOGY

## 2021-09-03 PROCEDURE — 93005 ELECTROCARDIOGRAM TRACING: CPT

## 2021-09-07 ENCOUNTER — LAB VISIT (OUTPATIENT)
Dept: PRIMARY CARE CLINIC | Facility: CLINIC | Age: 66
End: 2021-09-07
Payer: MEDICARE

## 2021-09-07 DIAGNOSIS — Z01.818 PRE-OP TESTING: ICD-10-CM

## 2021-09-07 PROCEDURE — U0005 INFEC AGEN DETEC AMPLI PROBE: HCPCS | Performed by: SURGERY

## 2021-09-07 PROCEDURE — U0003 INFECTIOUS AGENT DETECTION BY NUCLEIC ACID (DNA OR RNA); SEVERE ACUTE RESPIRATORY SYNDROME CORONAVIRUS 2 (SARS-COV-2) (CORONAVIRUS DISEASE [COVID-19]), AMPLIFIED PROBE TECHNIQUE, MAKING USE OF HIGH THROUGHPUT TECHNOLOGIES AS DESCRIBED BY CMS-2020-01-R: HCPCS | Performed by: SURGERY

## 2021-09-08 ENCOUNTER — TELEPHONE (OUTPATIENT)
Dept: TRANSPLANT | Facility: CLINIC | Age: 66
End: 2021-09-08

## 2021-09-08 LAB — SARS-COV-2 RNA RESP QL NAA+PROBE: NOT DETECTED

## 2021-09-09 ENCOUNTER — TELEPHONE (OUTPATIENT)
Dept: TRANSPLANT | Facility: CLINIC | Age: 66
End: 2021-09-09

## 2021-09-10 ENCOUNTER — TELEPHONE (OUTPATIENT)
Dept: SURGERY | Facility: CLINIC | Age: 66
End: 2021-09-10

## 2021-09-10 ENCOUNTER — ANESTHESIA EVENT (OUTPATIENT)
Dept: SURGERY | Facility: HOSPITAL | Age: 66
End: 2021-09-10
Payer: MEDICARE

## 2021-09-10 ENCOUNTER — ANESTHESIA (OUTPATIENT)
Dept: SURGERY | Facility: HOSPITAL | Age: 66
End: 2021-09-10
Payer: MEDICARE

## 2021-09-10 ENCOUNTER — TELEPHONE (OUTPATIENT)
Dept: TRANSPLANT | Facility: CLINIC | Age: 66
End: 2021-09-10

## 2021-09-10 ENCOUNTER — HOSPITAL ENCOUNTER (OUTPATIENT)
Facility: HOSPITAL | Age: 66
Discharge: HOME | End: 2021-09-10
Attending: SURGERY | Admitting: SURGERY
Payer: MEDICARE

## 2021-09-10 ENCOUNTER — TELEPHONE (OUTPATIENT)
Dept: INTERNAL MEDICINE | Facility: CLINIC | Age: 66
End: 2021-09-10

## 2021-09-10 DIAGNOSIS — D05.12 DUCTAL CARCINOMA IN SITU (DCIS) OF LEFT BREAST: ICD-10-CM

## 2021-09-10 PROCEDURE — 19301 PARTIAL MASTECTOMY: CPT | Mod: LT,,, | Performed by: SURGERY

## 2021-09-10 PROCEDURE — 37000008 HC ANESTHESIA 1ST 15 MINUTES: Performed by: SURGERY

## 2021-09-10 PROCEDURE — 63600175 PHARM REV CODE 636 W HCPCS: Performed by: NURSE ANESTHETIST, CERTIFIED REGISTERED

## 2021-09-10 PROCEDURE — 88341 IMHCHEM/IMCYTCHM EA ADD ANTB: CPT | Performed by: PATHOLOGY

## 2021-09-10 PROCEDURE — 71000015 HC POSTOP RECOV 1ST HR: Performed by: SURGERY

## 2021-09-10 PROCEDURE — 37000009 HC ANESTHESIA EA ADD 15 MINS: Performed by: SURGERY

## 2021-09-10 PROCEDURE — 88342 IMHCHEM/IMCYTCHM 1ST ANTB: CPT | Mod: 26,XU,, | Performed by: PATHOLOGY

## 2021-09-10 PROCEDURE — 71000039 HC RECOVERY, EACH ADD'L HOUR: Performed by: SURGERY

## 2021-09-10 PROCEDURE — 88307 TISSUE EXAM BY PATHOLOGIST: CPT | Mod: 59 | Performed by: PATHOLOGY

## 2021-09-10 PROCEDURE — 25000003 PHARM REV CODE 250: Performed by: SURGERY

## 2021-09-10 PROCEDURE — 19301 PR MASTECTOMY, PARTIAL: ICD-10-PCS | Mod: LT,,, | Performed by: SURGERY

## 2021-09-10 PROCEDURE — 88342 IMHCHEM/IMCYTCHM 1ST ANTB: CPT | Performed by: PATHOLOGY

## 2021-09-10 PROCEDURE — 63600175 PHARM REV CODE 636 W HCPCS: Performed by: ANESTHESIOLOGY

## 2021-09-10 PROCEDURE — 88360 TUMOR IMMUNOHISTOCHEM/MANUAL: CPT | Mod: 26,,, | Performed by: PATHOLOGY

## 2021-09-10 PROCEDURE — 88307 PR  SURG PATH,LEVEL V: ICD-10-PCS | Mod: 26,,, | Performed by: PATHOLOGY

## 2021-09-10 PROCEDURE — 88360 PR  TUMOR IMMUNOHISTOCHEM/MANUAL: ICD-10-PCS | Mod: 26,,, | Performed by: PATHOLOGY

## 2021-09-10 PROCEDURE — 88360 TUMOR IMMUNOHISTOCHEM/MANUAL: CPT | Mod: HCNC | Performed by: PATHOLOGY

## 2021-09-10 PROCEDURE — 71000033 HC RECOVERY, INTIAL HOUR: Performed by: SURGERY

## 2021-09-10 PROCEDURE — 88341 IMHCHEM/IMCYTCHM EA ADD ANTB: CPT | Mod: 26,XU,, | Performed by: PATHOLOGY

## 2021-09-10 PROCEDURE — 36000706: Performed by: SURGERY

## 2021-09-10 PROCEDURE — 25000003 PHARM REV CODE 250: Performed by: NURSE ANESTHETIST, CERTIFIED REGISTERED

## 2021-09-10 PROCEDURE — 88341 PR IHC OR ICC EACH ADD'L SINGLE ANTIBODY  STAINPR: ICD-10-PCS | Mod: 26,XU,, | Performed by: PATHOLOGY

## 2021-09-10 PROCEDURE — 88307 TISSUE EXAM BY PATHOLOGIST: CPT | Mod: 26,,, | Performed by: PATHOLOGY

## 2021-09-10 PROCEDURE — 88342 CHG IMMUNOCYTOCHEMISTRY: ICD-10-PCS | Mod: 26,XU,, | Performed by: PATHOLOGY

## 2021-09-10 PROCEDURE — 36000707: Performed by: SURGERY

## 2021-09-10 RX ORDER — FENTANYL CITRATE 50 UG/ML
25 INJECTION, SOLUTION INTRAMUSCULAR; INTRAVENOUS EVERY 5 MIN PRN
Status: COMPLETED | OUTPATIENT
Start: 2021-09-10 | End: 2021-09-10

## 2021-09-10 RX ORDER — ROCURONIUM BROMIDE 10 MG/ML
INJECTION, SOLUTION INTRAVENOUS
Status: DISCONTINUED | OUTPATIENT
Start: 2021-09-10 | End: 2021-09-10

## 2021-09-10 RX ORDER — OXYCODONE HYDROCHLORIDE 5 MG/1
5 TABLET ORAL EVERY 4 HOURS PRN
Status: DISCONTINUED | OUTPATIENT
Start: 2021-09-10 | End: 2021-09-10 | Stop reason: HOSPADM

## 2021-09-10 RX ORDER — TRAMADOL HYDROCHLORIDE 50 MG/1
50 TABLET ORAL EVERY 6 HOURS PRN
Qty: 20 TABLET | Refills: 0 | Status: SHIPPED | OUTPATIENT
Start: 2021-09-10 | End: 2021-11-22 | Stop reason: CLARIF

## 2021-09-10 RX ORDER — BUPIVACAINE HYDROCHLORIDE 2.5 MG/ML
INJECTION, SOLUTION EPIDURAL; INFILTRATION; INTRACAUDAL
Status: DISCONTINUED | OUTPATIENT
Start: 2021-09-10 | End: 2021-09-10 | Stop reason: HOSPADM

## 2021-09-10 RX ORDER — SODIUM CHLORIDE 9 MG/ML
INJECTION, SOLUTION INTRAVENOUS CONTINUOUS
Status: DISCONTINUED | OUTPATIENT
Start: 2021-09-10 | End: 2021-09-10 | Stop reason: HOSPADM

## 2021-09-10 RX ORDER — PROPOFOL 10 MG/ML
VIAL (ML) INTRAVENOUS
Status: DISCONTINUED | OUTPATIENT
Start: 2021-09-10 | End: 2021-09-10

## 2021-09-10 RX ORDER — ONDANSETRON 2 MG/ML
INJECTION INTRAMUSCULAR; INTRAVENOUS
Status: DISCONTINUED | OUTPATIENT
Start: 2021-09-10 | End: 2021-09-10

## 2021-09-10 RX ORDER — CLINDAMYCIN PHOSPHATE 900 MG/50ML
900 INJECTION, SOLUTION INTRAVENOUS
Status: COMPLETED | OUTPATIENT
Start: 2021-09-10 | End: 2021-09-10

## 2021-09-10 RX ORDER — LIDOCAINE HYDROCHLORIDE 10 MG/ML
INJECTION, SOLUTION EPIDURAL; INFILTRATION; INTRACAUDAL; PERINEURAL
Status: DISCONTINUED | OUTPATIENT
Start: 2021-09-10 | End: 2021-09-10 | Stop reason: HOSPADM

## 2021-09-10 RX ORDER — LIDOCAINE HYDROCHLORIDE 20 MG/ML
INJECTION INTRAVENOUS
Status: DISCONTINUED | OUTPATIENT
Start: 2021-09-10 | End: 2021-09-10

## 2021-09-10 RX ORDER — ONDANSETRON 2 MG/ML
4 INJECTION INTRAMUSCULAR; INTRAVENOUS DAILY PRN
Status: DISCONTINUED | OUTPATIENT
Start: 2021-09-10 | End: 2021-09-10 | Stop reason: HOSPADM

## 2021-09-10 RX ORDER — MORPHINE SULFATE 4 MG/ML
3 INJECTION, SOLUTION INTRAMUSCULAR; INTRAVENOUS
Status: DISCONTINUED | OUTPATIENT
Start: 2021-09-10 | End: 2021-09-10 | Stop reason: HOSPADM

## 2021-09-10 RX ORDER — SUCCINYLCHOLINE CHLORIDE 20 MG/ML
INJECTION INTRAMUSCULAR; INTRAVENOUS
Status: DISCONTINUED | OUTPATIENT
Start: 2021-09-10 | End: 2021-09-10

## 2021-09-10 RX ORDER — FENTANYL CITRATE 50 UG/ML
INJECTION, SOLUTION INTRAMUSCULAR; INTRAVENOUS
Status: DISCONTINUED | OUTPATIENT
Start: 2021-09-10 | End: 2021-09-10

## 2021-09-10 RX ORDER — ONDANSETRON 2 MG/ML
4 INJECTION INTRAMUSCULAR; INTRAVENOUS EVERY 12 HOURS PRN
Status: DISCONTINUED | OUTPATIENT
Start: 2021-09-10 | End: 2021-09-10 | Stop reason: HOSPADM

## 2021-09-10 RX ORDER — MIDAZOLAM HYDROCHLORIDE 1 MG/ML
INJECTION, SOLUTION INTRAMUSCULAR; INTRAVENOUS
Status: DISCONTINUED | OUTPATIENT
Start: 2021-09-10 | End: 2021-09-10

## 2021-09-10 RX ORDER — TRAMADOL HYDROCHLORIDE 50 MG/1
50 TABLET ORAL EVERY 4 HOURS PRN
Status: DISCONTINUED | OUTPATIENT
Start: 2021-09-10 | End: 2021-09-10 | Stop reason: HOSPADM

## 2021-09-10 RX ORDER — LIDOCAINE HYDROCHLORIDE 10 MG/ML
1 INJECTION, SOLUTION EPIDURAL; INFILTRATION; INTRACAUDAL; PERINEURAL ONCE
Status: DISCONTINUED | OUTPATIENT
Start: 2021-09-10 | End: 2021-09-10 | Stop reason: HOSPADM

## 2021-09-10 RX ADMIN — FENTANYL CITRATE 50 MCG: 50 INJECTION, SOLUTION INTRAMUSCULAR; INTRAVENOUS at 02:09

## 2021-09-10 RX ADMIN — FENTANYL CITRATE 25 MCG: 50 INJECTION INTRAMUSCULAR; INTRAVENOUS at 03:09

## 2021-09-10 RX ADMIN — MIDAZOLAM HYDROCHLORIDE 2 MG: 1 INJECTION, SOLUTION INTRAMUSCULAR; INTRAVENOUS at 01:09

## 2021-09-10 RX ADMIN — SODIUM CHLORIDE, SODIUM LACTATE, POTASSIUM CHLORIDE, AND CALCIUM CHLORIDE: .6; .31; .03; .02 INJECTION, SOLUTION INTRAVENOUS at 01:09

## 2021-09-10 RX ADMIN — CLINDAMYCIN PHOSPHATE 900 MG: 18 INJECTION, SOLUTION INTRAVENOUS at 01:09

## 2021-09-10 RX ADMIN — PROPOFOL 130 MG: 10 INJECTION, EMULSION INTRAVENOUS at 01:09

## 2021-09-10 RX ADMIN — ROCURONIUM BROMIDE 5 MG: 10 INJECTION, SOLUTION INTRAVENOUS at 01:09

## 2021-09-10 RX ADMIN — ONDANSETRON 4 MG: 2 INJECTION INTRAMUSCULAR; INTRAVENOUS at 12:09

## 2021-09-10 RX ADMIN — FENTANYL CITRATE 50 MCG: 50 INJECTION, SOLUTION INTRAMUSCULAR; INTRAVENOUS at 01:09

## 2021-09-10 RX ADMIN — SUCCINYLCHOLINE CHLORIDE 100 MG: 20 INJECTION, SOLUTION INTRAMUSCULAR; INTRAVENOUS at 01:09

## 2021-09-10 RX ADMIN — LIDOCAINE HYDROCHLORIDE 50 MG: 20 INJECTION, SOLUTION INTRAVENOUS at 01:09

## 2021-09-10 RX ADMIN — ONDANSETRON 4 MG: 2 INJECTION, SOLUTION INTRAMUSCULAR; INTRAVENOUS at 01:09

## 2021-09-15 VITALS
TEMPERATURE: 98 F | HEART RATE: 83 BPM | SYSTOLIC BLOOD PRESSURE: 136 MMHG | BODY MASS INDEX: 31.47 KG/M2 | HEIGHT: 62 IN | RESPIRATION RATE: 16 BRPM | WEIGHT: 171 LBS | OXYGEN SATURATION: 100 % | DIASTOLIC BLOOD PRESSURE: 73 MMHG

## 2021-09-27 ENCOUNTER — DOCUMENTATION ONLY (OUTPATIENT)
Dept: HEMATOLOGY/ONCOLOGY | Facility: CLINIC | Age: 66
End: 2021-09-27

## 2021-09-29 ENCOUNTER — OFFICE VISIT (OUTPATIENT)
Dept: SURGERY | Facility: CLINIC | Age: 66
End: 2021-09-29
Payer: MEDICARE

## 2021-09-29 VITALS
SYSTOLIC BLOOD PRESSURE: 140 MMHG | HEIGHT: 62 IN | DIASTOLIC BLOOD PRESSURE: 89 MMHG | HEART RATE: 90 BPM | WEIGHT: 172.19 LBS | BODY MASS INDEX: 31.68 KG/M2

## 2021-09-29 DIAGNOSIS — Z01.818 PRE-OP TESTING: ICD-10-CM

## 2021-09-29 DIAGNOSIS — D05.12 DUCTAL CARCINOMA IN SITU (DCIS) OF LEFT BREAST: Primary | ICD-10-CM

## 2021-09-29 DIAGNOSIS — C50.912 DUCTAL CARCINOMA IN SITU (DCIS) OF LEFT BREAST WITH MICROINVASIVE COMPONENT: ICD-10-CM

## 2021-09-29 DIAGNOSIS — R92.8 ABNORMAL MAMMOGRAM: ICD-10-CM

## 2021-09-29 PROCEDURE — 3079F DIAST BP 80-89 MM HG: CPT | Mod: HCNC,CPTII,S$GLB, | Performed by: SURGERY

## 2021-09-29 PROCEDURE — 3066F NEPHROPATHY DOC TX: CPT | Mod: HCNC,CPTII,S$GLB, | Performed by: SURGERY

## 2021-09-29 PROCEDURE — 1160F RVW MEDS BY RX/DR IN RCRD: CPT | Mod: HCNC,CPTII,S$GLB, | Performed by: SURGERY

## 2021-09-29 PROCEDURE — 4010F ACE/ARB THERAPY RXD/TAKEN: CPT | Mod: HCNC,CPTII,S$GLB, | Performed by: SURGERY

## 2021-09-29 PROCEDURE — 99999 PR PBB SHADOW E&M-EST. PATIENT-LVL IV: ICD-10-PCS | Mod: PBBFAC,HCNC,, | Performed by: SURGERY

## 2021-09-29 PROCEDURE — 3008F BODY MASS INDEX DOCD: CPT | Mod: HCNC,CPTII,S$GLB, | Performed by: SURGERY

## 2021-09-29 PROCEDURE — 3079F PR MOST RECENT DIASTOLIC BLOOD PRESSURE 80-89 MM HG: ICD-10-PCS | Mod: HCNC,CPTII,S$GLB, | Performed by: SURGERY

## 2021-09-29 PROCEDURE — 3066F PR DOCUMENTATION OF TREATMENT FOR NEPHROPATHY: ICD-10-PCS | Mod: HCNC,CPTII,S$GLB, | Performed by: SURGERY

## 2021-09-29 PROCEDURE — 3077F SYST BP >= 140 MM HG: CPT | Mod: HCNC,CPTII,S$GLB, | Performed by: SURGERY

## 2021-09-29 PROCEDURE — 1159F MED LIST DOCD IN RCRD: CPT | Mod: HCNC,CPTII,S$GLB, | Performed by: SURGERY

## 2021-09-29 PROCEDURE — 4010F PR ACE/ARB THEARPY RXD/TAKEN: ICD-10-PCS | Mod: HCNC,CPTII,S$GLB, | Performed by: SURGERY

## 2021-09-29 PROCEDURE — 99024 POSTOP FOLLOW-UP VISIT: CPT | Mod: HCNC,S$GLB,, | Performed by: SURGERY

## 2021-09-29 PROCEDURE — 3077F PR MOST RECENT SYSTOLIC BLOOD PRESSURE >= 140 MM HG: ICD-10-PCS | Mod: HCNC,CPTII,S$GLB, | Performed by: SURGERY

## 2021-09-29 PROCEDURE — 3044F HG A1C LEVEL LT 7.0%: CPT | Mod: HCNC,CPTII,S$GLB, | Performed by: SURGERY

## 2021-09-29 PROCEDURE — 3044F PR MOST RECENT HEMOGLOBIN A1C LEVEL <7.0%: ICD-10-PCS | Mod: HCNC,CPTII,S$GLB, | Performed by: SURGERY

## 2021-09-29 PROCEDURE — 1126F AMNT PAIN NOTED NONE PRSNT: CPT | Mod: HCNC,CPTII,S$GLB, | Performed by: SURGERY

## 2021-09-29 PROCEDURE — 1126F PR PAIN SEVERITY QUANTIFIED, NO PAIN PRESENT: ICD-10-PCS | Mod: HCNC,CPTII,S$GLB, | Performed by: SURGERY

## 2021-09-29 PROCEDURE — 1160F PR REVIEW ALL MEDS BY PRESCRIBER/CLIN PHARMACIST DOCUMENTED: ICD-10-PCS | Mod: HCNC,CPTII,S$GLB, | Performed by: SURGERY

## 2021-09-29 PROCEDURE — 1159F PR MEDICATION LIST DOCUMENTED IN MEDICAL RECORD: ICD-10-PCS | Mod: HCNC,CPTII,S$GLB, | Performed by: SURGERY

## 2021-09-29 PROCEDURE — 3008F PR BODY MASS INDEX (BMI) DOCUMENTED: ICD-10-PCS | Mod: HCNC,CPTII,S$GLB, | Performed by: SURGERY

## 2021-09-29 PROCEDURE — 99999 PR PBB SHADOW E&M-EST. PATIENT-LVL IV: CPT | Mod: PBBFAC,HCNC,, | Performed by: SURGERY

## 2021-09-29 PROCEDURE — 99024 PR POST-OP FOLLOW-UP VISIT: ICD-10-PCS | Mod: HCNC,S$GLB,, | Performed by: SURGERY

## 2021-09-29 RX ORDER — LIDOCAINE HYDROCHLORIDE 10 MG/ML
1 INJECTION, SOLUTION EPIDURAL; INFILTRATION; INTRACAUDAL; PERINEURAL ONCE
Status: CANCELLED | OUTPATIENT
Start: 2021-09-29 | End: 2021-09-29

## 2021-09-29 RX ORDER — SODIUM CHLORIDE 9 MG/ML
INJECTION, SOLUTION INTRAVENOUS CONTINUOUS
Status: CANCELLED | OUTPATIENT
Start: 2021-09-29

## 2021-09-30 ENCOUNTER — DOCUMENTATION ONLY (OUTPATIENT)
Dept: HEMATOLOGY/ONCOLOGY | Facility: CLINIC | Age: 66
End: 2021-09-30

## 2021-09-30 ENCOUNTER — TELEPHONE (OUTPATIENT)
Dept: PRIMARY CARE CLINIC | Facility: CLINIC | Age: 66
End: 2021-09-30

## 2021-10-07 ENCOUNTER — OFFICE VISIT (OUTPATIENT)
Dept: PRIMARY CARE CLINIC | Facility: CLINIC | Age: 66
End: 2021-10-07
Payer: MEDICARE

## 2021-10-07 VITALS
SYSTOLIC BLOOD PRESSURE: 132 MMHG | TEMPERATURE: 99 F | HEIGHT: 62 IN | OXYGEN SATURATION: 98 % | HEART RATE: 96 BPM | BODY MASS INDEX: 31.8 KG/M2 | WEIGHT: 172.81 LBS | DIASTOLIC BLOOD PRESSURE: 80 MMHG

## 2021-10-07 DIAGNOSIS — F33.9 CHRONIC MAJOR DEPRESSIVE DISORDER, RECURRENT EPISODE: ICD-10-CM

## 2021-10-07 DIAGNOSIS — D84.821 IMMUNOSUPPRESSION DUE TO DRUG THERAPY: ICD-10-CM

## 2021-10-07 DIAGNOSIS — Z79.899 IMMUNOSUPPRESSION DUE TO DRUG THERAPY: ICD-10-CM

## 2021-10-07 DIAGNOSIS — D50.9 IRON DEFICIENCY ANEMIA, UNSPECIFIED IRON DEFICIENCY ANEMIA TYPE: ICD-10-CM

## 2021-10-07 DIAGNOSIS — Z94.1 HEART TRANSPLANTED: Chronic | ICD-10-CM

## 2021-10-07 DIAGNOSIS — N18.32 STAGE 3B CHRONIC KIDNEY DISEASE: ICD-10-CM

## 2021-10-07 DIAGNOSIS — E21.3 HYPERPARATHYROIDISM: ICD-10-CM

## 2021-10-07 DIAGNOSIS — G47.00 INSOMNIA, UNSPECIFIED TYPE: Primary | ICD-10-CM

## 2021-10-07 DIAGNOSIS — I10 ESSENTIAL HYPERTENSION: ICD-10-CM

## 2021-10-07 PROBLEM — J02.9 PHARYNGITIS: Status: RESOLVED | Noted: 2019-01-09 | Resolved: 2021-10-07

## 2021-10-07 PROCEDURE — 1126F PR PAIN SEVERITY QUANTIFIED, NO PAIN PRESENT: ICD-10-PCS | Mod: HCNC,CPTII,S$GLB, | Performed by: NURSE PRACTITIONER

## 2021-10-07 PROCEDURE — 3044F HG A1C LEVEL LT 7.0%: CPT | Mod: HCNC,CPTII,S$GLB, | Performed by: NURSE PRACTITIONER

## 2021-10-07 PROCEDURE — 99215 PR OFFICE/OUTPT VISIT, EST, LEVL V, 40-54 MIN: ICD-10-PCS | Mod: HCNC,S$GLB,, | Performed by: NURSE PRACTITIONER

## 2021-10-07 PROCEDURE — 99499 UNLISTED E&M SERVICE: CPT | Mod: S$GLB,,, | Performed by: NURSE PRACTITIONER

## 2021-10-07 PROCEDURE — 1160F PR REVIEW ALL MEDS BY PRESCRIBER/CLIN PHARMACIST DOCUMENTED: ICD-10-PCS | Mod: HCNC,CPTII,S$GLB, | Performed by: NURSE PRACTITIONER

## 2021-10-07 PROCEDURE — 3288F FALL RISK ASSESSMENT DOCD: CPT | Mod: HCNC,CPTII,S$GLB, | Performed by: NURSE PRACTITIONER

## 2021-10-07 PROCEDURE — 3008F PR BODY MASS INDEX (BMI) DOCUMENTED: ICD-10-PCS | Mod: HCNC,CPTII,S$GLB, | Performed by: NURSE PRACTITIONER

## 2021-10-07 PROCEDURE — 1101F PR PT FALLS ASSESS DOC 0-1 FALLS W/OUT INJ PAST YR: ICD-10-PCS | Mod: HCNC,CPTII,S$GLB, | Performed by: NURSE PRACTITIONER

## 2021-10-07 PROCEDURE — 99215 OFFICE O/P EST HI 40 MIN: CPT | Mod: HCNC,S$GLB,, | Performed by: NURSE PRACTITIONER

## 2021-10-07 PROCEDURE — 99499 RISK ADDL DX/OHS AUDIT: ICD-10-PCS | Mod: S$GLB,,, | Performed by: NURSE PRACTITIONER

## 2021-10-07 PROCEDURE — 3288F PR FALLS RISK ASSESSMENT DOCUMENTED: ICD-10-PCS | Mod: HCNC,CPTII,S$GLB, | Performed by: NURSE PRACTITIONER

## 2021-10-07 PROCEDURE — 3066F PR DOCUMENTATION OF TREATMENT FOR NEPHROPATHY: ICD-10-PCS | Mod: HCNC,CPTII,S$GLB, | Performed by: NURSE PRACTITIONER

## 2021-10-07 PROCEDURE — 90694 FLU VACCINE - QUADRIVALENT - ADJUVANTED: ICD-10-PCS | Mod: HCNC,S$GLB,, | Performed by: NURSE PRACTITIONER

## 2021-10-07 PROCEDURE — 1101F PT FALLS ASSESS-DOCD LE1/YR: CPT | Mod: HCNC,CPTII,S$GLB, | Performed by: NURSE PRACTITIONER

## 2021-10-07 PROCEDURE — 3079F DIAST BP 80-89 MM HG: CPT | Mod: HCNC,CPTII,S$GLB, | Performed by: NURSE PRACTITIONER

## 2021-10-07 PROCEDURE — 3008F BODY MASS INDEX DOCD: CPT | Mod: HCNC,CPTII,S$GLB, | Performed by: NURSE PRACTITIONER

## 2021-10-07 PROCEDURE — 3075F SYST BP GE 130 - 139MM HG: CPT | Mod: HCNC,CPTII,S$GLB, | Performed by: NURSE PRACTITIONER

## 2021-10-07 PROCEDURE — 3079F PR MOST RECENT DIASTOLIC BLOOD PRESSURE 80-89 MM HG: ICD-10-PCS | Mod: HCNC,CPTII,S$GLB, | Performed by: NURSE PRACTITIONER

## 2021-10-07 PROCEDURE — 3044F PR MOST RECENT HEMOGLOBIN A1C LEVEL <7.0%: ICD-10-PCS | Mod: HCNC,CPTII,S$GLB, | Performed by: NURSE PRACTITIONER

## 2021-10-07 PROCEDURE — 1126F AMNT PAIN NOTED NONE PRSNT: CPT | Mod: HCNC,CPTII,S$GLB, | Performed by: NURSE PRACTITIONER

## 2021-10-07 PROCEDURE — 1160F RVW MEDS BY RX/DR IN RCRD: CPT | Mod: HCNC,CPTII,S$GLB, | Performed by: NURSE PRACTITIONER

## 2021-10-07 PROCEDURE — 4010F ACE/ARB THERAPY RXD/TAKEN: CPT | Mod: HCNC,CPTII,S$GLB, | Performed by: NURSE PRACTITIONER

## 2021-10-07 PROCEDURE — G0008 ADMIN INFLUENZA VIRUS VAC: HCPCS | Mod: HCNC,S$GLB,, | Performed by: NURSE PRACTITIONER

## 2021-10-07 PROCEDURE — 99999 PR PBB SHADOW E&M-EST. PATIENT-LVL III: CPT | Mod: PBBFAC,HCNC,, | Performed by: NURSE PRACTITIONER

## 2021-10-07 PROCEDURE — 1159F PR MEDICATION LIST DOCUMENTED IN MEDICAL RECORD: ICD-10-PCS | Mod: HCNC,CPTII,S$GLB, | Performed by: NURSE PRACTITIONER

## 2021-10-07 PROCEDURE — 1159F MED LIST DOCD IN RCRD: CPT | Mod: HCNC,CPTII,S$GLB, | Performed by: NURSE PRACTITIONER

## 2021-10-07 PROCEDURE — 3066F NEPHROPATHY DOC TX: CPT | Mod: HCNC,CPTII,S$GLB, | Performed by: NURSE PRACTITIONER

## 2021-10-07 PROCEDURE — 90694 VACC AIIV4 NO PRSRV 0.5ML IM: CPT | Mod: HCNC,S$GLB,, | Performed by: NURSE PRACTITIONER

## 2021-10-07 PROCEDURE — 99999 PR PBB SHADOW E&M-EST. PATIENT-LVL III: ICD-10-PCS | Mod: PBBFAC,HCNC,, | Performed by: NURSE PRACTITIONER

## 2021-10-07 PROCEDURE — 3075F PR MOST RECENT SYSTOLIC BLOOD PRESS GE 130-139MM HG: ICD-10-PCS | Mod: HCNC,CPTII,S$GLB, | Performed by: NURSE PRACTITIONER

## 2021-10-07 PROCEDURE — 4010F PR ACE/ARB THEARPY RXD/TAKEN: ICD-10-PCS | Mod: HCNC,CPTII,S$GLB, | Performed by: NURSE PRACTITIONER

## 2021-10-07 PROCEDURE — G0008 FLU VACCINE - QUADRIVALENT - ADJUVANTED: ICD-10-PCS | Mod: HCNC,S$GLB,, | Performed by: NURSE PRACTITIONER

## 2021-10-07 RX ORDER — FUROSEMIDE 20 MG/1
TABLET ORAL
Qty: 180 TABLET | Refills: 3 | Status: SHIPPED | OUTPATIENT
Start: 2021-10-07 | End: 2021-12-02 | Stop reason: SDUPTHER

## 2021-10-07 RX ORDER — TEMAZEPAM 30 MG/1
CAPSULE ORAL
Qty: 90 CAPSULE | Refills: 1 | Status: SHIPPED | OUTPATIENT
Start: 2021-10-07 | End: 2022-05-04 | Stop reason: SDUPTHER

## 2021-10-07 RX ORDER — FUROSEMIDE 20 MG/1
TABLET ORAL
Qty: 180 TABLET | Refills: 3 | Status: SHIPPED | OUTPATIENT
Start: 2021-10-07 | End: 2021-10-07

## 2021-10-08 ENCOUNTER — OFFICE VISIT (OUTPATIENT)
Dept: HEMATOLOGY/ONCOLOGY | Facility: CLINIC | Age: 66
End: 2021-10-08
Payer: MEDICARE

## 2021-10-08 VITALS
OXYGEN SATURATION: 98 % | TEMPERATURE: 98 F | BODY MASS INDEX: 31.64 KG/M2 | HEIGHT: 62 IN | SYSTOLIC BLOOD PRESSURE: 136 MMHG | DIASTOLIC BLOOD PRESSURE: 87 MMHG | WEIGHT: 171.94 LBS | HEART RATE: 98 BPM

## 2021-10-08 DIAGNOSIS — D84.9 IMMUNOCOMPROMISED PATIENT: ICD-10-CM

## 2021-10-08 DIAGNOSIS — Z94.1 HEART TRANSPLANTED: Chronic | ICD-10-CM
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
## 2021-10-08 DIAGNOSIS — D05.12 DUCTAL CARCINOMA IN SITU (DCIS) OF LEFT BREAST: Primary | ICD-10-CM

## 2021-10-08 DIAGNOSIS — D84.822 IMMUNODEFICIENCY SECONDARY TO RADIATION THERAPY: ICD-10-CM

## 2021-10-08 PROCEDURE — 99999 PR PBB SHADOW E&M-EST. PATIENT-LVL III: ICD-10-PCS | Mod: PBBFAC,HCNC,, | Performed by: INTERNAL MEDICINE

## 2021-10-08 PROCEDURE — 99999 PR PBB SHADOW E&M-EST. PATIENT-LVL III: CPT | Mod: PBBFAC,HCNC,, | Performed by: INTERNAL MEDICINE

## 2021-10-08 PROCEDURE — 3044F HG A1C LEVEL LT 7.0%: CPT | Mod: HCNC,CPTII,S$GLB, | Performed by: INTERNAL MEDICINE

## 2021-10-08 PROCEDURE — 3079F PR MOST RECENT DIASTOLIC BLOOD PRESSURE 80-89 MM HG: ICD-10-PCS | Mod: HCNC,CPTII,S$GLB, | Performed by: INTERNAL MEDICINE

## 2021-10-08 PROCEDURE — 99499 RISK ADDL DX/OHS AUDIT: ICD-10-PCS | Mod: HCNC,S$GLB,, | Performed by: INTERNAL MEDICINE

## 2021-10-08 PROCEDURE — 3075F PR MOST RECENT SYSTOLIC BLOOD PRESS GE 130-139MM HG: ICD-10-PCS | Mod: HCNC,CPTII,S$GLB, | Performed by: INTERNAL MEDICINE

## 2021-10-08 PROCEDURE — 3288F FALL RISK ASSESSMENT DOCD: CPT | Mod: HCNC,CPTII,S$GLB, | Performed by: INTERNAL MEDICINE

## 2021-10-08 PROCEDURE — 3066F NEPHROPATHY DOC TX: CPT | Mod: HCNC,CPTII,S$GLB, | Performed by: INTERNAL MEDICINE

## 2021-10-08 PROCEDURE — 3075F SYST BP GE 130 - 139MM HG: CPT | Mod: HCNC,CPTII,S$GLB, | Performed by: INTERNAL MEDICINE

## 2021-10-08 PROCEDURE — 1160F RVW MEDS BY RX/DR IN RCRD: CPT | Mod: HCNC,CPTII,S$GLB, | Performed by: INTERNAL MEDICINE

## 2021-10-08 PROCEDURE — 1126F AMNT PAIN NOTED NONE PRSNT: CPT | Mod: HCNC,CPTII,S$GLB, | Performed by: INTERNAL MEDICINE

## 2021-10-08 PROCEDURE — 1101F PR PT FALLS ASSESS DOC 0-1 FALLS W/OUT INJ PAST YR: ICD-10-PCS | Mod: HCNC,CPTII,S$GLB, | Performed by: INTERNAL MEDICINE

## 2021-10-08 PROCEDURE — 4010F PR ACE/ARB THEARPY RXD/TAKEN: ICD-10-PCS | Mod: HCNC,CPTII,S$GLB, | Performed by: INTERNAL MEDICINE

## 2021-10-08 PROCEDURE — 99214 PR OFFICE/OUTPT VISIT, EST, LEVL IV, 30-39 MIN: ICD-10-PCS | Mod: HCNC,S$GLB,, | Performed by: INTERNAL MEDICINE

## 2021-10-08 PROCEDURE — 1159F PR MEDICATION LIST DOCUMENTED IN MEDICAL RECORD: ICD-10-PCS | Mod: HCNC,CPTII,S$GLB, | Performed by: INTERNAL MEDICINE

## 2021-10-08 PROCEDURE — 1101F PT FALLS ASSESS-DOCD LE1/YR: CPT | Mod: HCNC,CPTII,S$GLB, | Performed by: INTERNAL MEDICINE

## 2021-10-08 PROCEDURE — 1160F PR REVIEW ALL MEDS BY PRESCRIBER/CLIN PHARMACIST DOCUMENTED: ICD-10-PCS | Mod: HCNC,CPTII,S$GLB, | Performed by: INTERNAL MEDICINE

## 2021-10-08 PROCEDURE — 3044F PR MOST RECENT HEMOGLOBIN A1C LEVEL <7.0%: ICD-10-PCS | Mod: HCNC,CPTII,S$GLB, | Performed by: INTERNAL MEDICINE

## 2021-10-08 PROCEDURE — 3288F PR FALLS RISK ASSESSMENT DOCUMENTED: ICD-10-PCS | Mod: HCNC,CPTII,S$GLB, | Performed by: INTERNAL MEDICINE

## 2021-10-08 PROCEDURE — 4010F ACE/ARB THERAPY RXD/TAKEN: CPT | Mod: HCNC,CPTII,S$GLB, | Performed by: INTERNAL MEDICINE

## 2021-10-08 PROCEDURE — 3008F BODY MASS INDEX DOCD: CPT | Mod: HCNC,CPTII,S$GLB, | Performed by: INTERNAL MEDICINE

## 2021-10-08 PROCEDURE — 1159F MED LIST DOCD IN RCRD: CPT | Mod: HCNC,CPTII,S$GLB, | Performed by: INTERNAL MEDICINE

## 2021-10-08 PROCEDURE — 99214 OFFICE O/P EST MOD 30 MIN: CPT | Mod: HCNC,S$GLB,, | Performed by: INTERNAL MEDICINE

## 2021-10-08 PROCEDURE — 99499 UNLISTED E&M SERVICE: CPT | Mod: HCNC,S$GLB,, | Performed by: INTERNAL MEDICINE

## 2021-10-08 PROCEDURE — 1126F PR PAIN SEVERITY QUANTIFIED, NO PAIN PRESENT: ICD-10-PCS | Mod: HCNC,CPTII,S$GLB, | Performed by: INTERNAL MEDICINE

## 2021-10-08 PROCEDURE — 3079F DIAST BP 80-89 MM HG: CPT | Mod: HCNC,CPTII,S$GLB, | Performed by: INTERNAL MEDICINE

## 2021-10-08 PROCEDURE — 3066F PR DOCUMENTATION OF TREATMENT FOR NEPHROPATHY: ICD-10-PCS | Mod: HCNC,CPTII,S$GLB, | Performed by: INTERNAL MEDICINE

## 2021-10-08 PROCEDURE — 3008F PR BODY MASS INDEX (BMI) DOCUMENTED: ICD-10-PCS | Mod: HCNC,CPTII,S$GLB, | Performed by: INTERNAL MEDICINE

## 2021-10-09 ENCOUNTER — LAB VISIT (OUTPATIENT)
Dept: PRIMARY CARE CLINIC | Facility: CLINIC | Age: 66
End: 2021-10-09
Payer: MEDICARE

## 2021-10-09 DIAGNOSIS — Z01.818 PRE-OP TESTING: ICD-10-CM

## 2021-10-09 PROCEDURE — U0003 INFECTIOUS AGENT DETECTION BY NUCLEIC ACID (DNA OR RNA); SEVERE ACUTE RESPIRATORY SYNDROME CORONAVIRUS 2 (SARS-COV-2) (CORONAVIRUS DISEASE [COVID-19]), AMPLIFIED PROBE TECHNIQUE, MAKING USE OF HIGH THROUGHPUT TECHNOLOGIES AS DESCRIBED BY CMS-2020-01-R: HCPCS | Mod: HCNC | Performed by: SURGERY

## 2021-10-09 PROCEDURE — U0005 INFEC AGEN DETEC AMPLI PROBE: HCPCS | Performed by: SURGERY

## 2021-10-11 LAB
SARS-COV-2 RNA RESP QL NAA+PROBE: NOT DETECTED
SARS-COV-2- CYCLE NUMBER: NORMAL

## 2021-10-12 ENCOUNTER — ANESTHESIA EVENT (OUTPATIENT)
Dept: SURGERY | Facility: HOSPITAL | Age: 66
End: 2021-10-12
Payer: MEDICARE

## 2021-10-12 ENCOUNTER — ANESTHESIA (OUTPATIENT)
Dept: SURGERY | Facility: HOSPITAL | Age: 66
End: 2021-10-12
Payer: MEDICARE

## 2021-10-12 ENCOUNTER — HOSPITAL ENCOUNTER (OUTPATIENT)
Dept: RADIOLOGY | Facility: HOSPITAL | Age: 66
Discharge: HOME | End: 2021-10-12
Attending: SURGERY | Admitting: SURGERY
Payer: MEDICARE

## 2021-10-12 ENCOUNTER — HOSPITAL ENCOUNTER (OUTPATIENT)
Facility: HOSPITAL | Age: 66
Discharge: HOME | End: 2021-10-12
Attending: SURGERY | Admitting: SURGERY
Payer: MEDICARE

## 2021-10-12 DIAGNOSIS — R92.8 ABNORMAL MAMMOGRAM: ICD-10-CM

## 2021-10-12 DIAGNOSIS — D05.12 DUCTAL CARCINOMA IN SITU (DCIS) OF LEFT BREAST: Primary | ICD-10-CM

## 2021-10-12 DIAGNOSIS — C50.912 DUCTAL CARCINOMA IN SITU (DCIS) OF LEFT BREAST WITH MICROINVASIVE COMPONENT: ICD-10-CM

## 2021-10-12 PROCEDURE — 25000003 PHARM REV CODE 250: Mod: HCNC | Performed by: NURSE ANESTHETIST, CERTIFIED REGISTERED

## 2021-10-12 PROCEDURE — 63600175 PHARM REV CODE 636 W HCPCS: Mod: HCNC | Performed by: ANESTHESIOLOGY

## 2021-10-12 PROCEDURE — 25000003 PHARM REV CODE 250: Mod: HCNC | Performed by: SURGERY

## 2021-10-12 PROCEDURE — 88341 IMHCHEM/IMCYTCHM EA ADD ANTB: CPT | Mod: 59,HCNC | Performed by: PATHOLOGY

## 2021-10-12 PROCEDURE — 38900 PR INTRAOPERATIVE SENTINEL LYMPH NODE ID W DYE INJECTION: ICD-10-PCS | Mod: 79,LT,, | Performed by: SURGERY

## 2021-10-12 PROCEDURE — 88341 PR IHC OR ICC EACH ADD'L SINGLE ANTIBODY  STAINPR: ICD-10-PCS | Mod: 26,HCNC,, | Performed by: PATHOLOGY

## 2021-10-12 PROCEDURE — 88342 IMHCHEM/IMCYTCHM 1ST ANTB: CPT | Mod: 26,HCNC,, | Performed by: PATHOLOGY

## 2021-10-12 PROCEDURE — 37000008 HC ANESTHESIA 1ST 15 MINUTES: Mod: HCNC | Performed by: SURGERY

## 2021-10-12 PROCEDURE — 63600175 PHARM REV CODE 636 W HCPCS: Mod: HCNC

## 2021-10-12 PROCEDURE — 37000009 HC ANESTHESIA EA ADD 15 MINS: Mod: HCNC | Performed by: SURGERY

## 2021-10-12 PROCEDURE — 88307 TISSUE EXAM BY PATHOLOGIST: CPT | Mod: HCNC | Performed by: PATHOLOGY

## 2021-10-12 PROCEDURE — 36000706: Mod: HCNC | Performed by: SURGERY

## 2021-10-12 PROCEDURE — 88307 TISSUE EXAM BY PATHOLOGIST: CPT | Mod: 26,HCNC,, | Performed by: PATHOLOGY

## 2021-10-12 PROCEDURE — 38900 IO MAP OF SENT LYMPH NODE: CPT | Mod: 79,LT,, | Performed by: SURGERY

## 2021-10-12 PROCEDURE — 88342 IMHCHEM/IMCYTCHM 1ST ANTB: CPT | Mod: HCNC | Performed by: PATHOLOGY

## 2021-10-12 PROCEDURE — A9520 TC99 TILMANOCEPT DIAG 0.5MCI: HCPCS | Mod: HCNC

## 2021-10-12 PROCEDURE — 63600175 PHARM REV CODE 636 W HCPCS: Mod: HCNC | Performed by: SURGERY

## 2021-10-12 PROCEDURE — 71000033 HC RECOVERY, INTIAL HOUR: Mod: HCNC | Performed by: SURGERY

## 2021-10-12 PROCEDURE — 71000015 HC POSTOP RECOV 1ST HR: Mod: HCNC | Performed by: SURGERY

## 2021-10-12 PROCEDURE — 88307 PR  SURG PATH,LEVEL V: ICD-10-PCS | Mod: 26,HCNC,, | Performed by: PATHOLOGY

## 2021-10-12 PROCEDURE — 38525 BIOPSY/REMOVAL LYMPH NODES: CPT | Mod: 79,LT,, | Performed by: SURGERY

## 2021-10-12 PROCEDURE — 88341 IMHCHEM/IMCYTCHM EA ADD ANTB: CPT | Mod: 26,HCNC,, | Performed by: PATHOLOGY

## 2021-10-12 PROCEDURE — 88342 CHG IMMUNOCYTOCHEMISTRY: ICD-10-PCS | Mod: 26,HCNC,, | Performed by: PATHOLOGY

## 2021-10-12 PROCEDURE — 63600175 PHARM REV CODE 636 W HCPCS: Mod: HCNC | Performed by: NURSE ANESTHETIST, CERTIFIED REGISTERED

## 2021-10-12 PROCEDURE — 38525 PR BIOPSY/REM LYMPH NODES, AXILLARY: ICD-10-PCS | Mod: 79,LT,, | Performed by: SURGERY

## 2021-10-12 PROCEDURE — 27201423 OPTIME MED/SURG SUP & DEVICES STERILE SUPPLY: Mod: HCNC | Performed by: SURGERY

## 2021-10-12 PROCEDURE — 71000039 HC RECOVERY, EACH ADD'L HOUR: Mod: HCNC | Performed by: SURGERY

## 2021-10-12 PROCEDURE — 36000707: Mod: HCNC | Performed by: SURGERY

## 2021-10-12 RX ORDER — TRAMADOL HYDROCHLORIDE 50 MG/1
50 TABLET ORAL EVERY 4 HOURS PRN
Status: DISCONTINUED | OUTPATIENT
Start: 2021-10-12 | End: 2021-10-12 | Stop reason: HOSPADM

## 2021-10-12 RX ORDER — LIDOCAINE HYDROCHLORIDE 10 MG/ML
1 INJECTION, SOLUTION EPIDURAL; INFILTRATION; INTRACAUDAL; PERINEURAL ONCE
Status: DISCONTINUED | OUTPATIENT
Start: 2021-10-12 | End: 2021-10-12 | Stop reason: HOSPADM

## 2021-10-12 RX ORDER — LIDOCAINE HYDROCHLORIDE 20 MG/ML
INJECTION INTRAVENOUS
Status: DISCONTINUED | OUTPATIENT
Start: 2021-10-12 | End: 2021-10-12

## 2021-10-12 RX ORDER — MIDAZOLAM HYDROCHLORIDE 1 MG/ML
INJECTION INTRAMUSCULAR; INTRAVENOUS
Status: DISCONTINUED | OUTPATIENT
Start: 2021-10-12 | End: 2021-10-12

## 2021-10-12 RX ORDER — CLINDAMYCIN PHOSPHATE 900 MG/50ML
900 INJECTION, SOLUTION INTRAVENOUS
Status: COMPLETED | OUTPATIENT
Start: 2021-10-12 | End: 2021-10-12

## 2021-10-12 RX ORDER — SODIUM CHLORIDE 9 MG/ML
INJECTION, SOLUTION INTRAVENOUS CONTINUOUS
Status: CANCELLED | OUTPATIENT
Start: 2021-10-12

## 2021-10-12 RX ORDER — BUPIVACAINE HYDROCHLORIDE 2.5 MG/ML
INJECTION, SOLUTION EPIDURAL; INFILTRATION; INTRACAUDAL
Status: DISCONTINUED | OUTPATIENT
Start: 2021-10-12 | End: 2021-10-12 | Stop reason: HOSPADM

## 2021-10-12 RX ORDER — LIDOCAINE HYDROCHLORIDE 10 MG/ML
INJECTION, SOLUTION EPIDURAL; INFILTRATION; INTRACAUDAL; PERINEURAL
Status: DISCONTINUED | OUTPATIENT
Start: 2021-10-12 | End: 2021-10-12 | Stop reason: HOSPADM

## 2021-10-12 RX ORDER — ONDANSETRON 2 MG/ML
4 INJECTION INTRAMUSCULAR; INTRAVENOUS EVERY 12 HOURS PRN
Status: CANCELLED | OUTPATIENT
Start: 2021-10-12

## 2021-10-12 RX ORDER — SODIUM CHLORIDE 9 MG/ML
INJECTION, SOLUTION INTRAVENOUS CONTINUOUS
Status: DISCONTINUED | OUTPATIENT
Start: 2021-10-12 | End: 2021-10-12 | Stop reason: HOSPADM

## 2021-10-12 RX ORDER — DEXAMETHASONE SODIUM PHOSPHATE 4 MG/ML
INJECTION, SOLUTION INTRA-ARTICULAR; INTRALESIONAL; INTRAMUSCULAR; INTRAVENOUS; SOFT TISSUE
Status: DISCONTINUED | OUTPATIENT
Start: 2021-10-12 | End: 2021-10-12

## 2021-10-12 RX ORDER — HYDRALAZINE HYDROCHLORIDE 20 MG/ML
10 INJECTION INTRAMUSCULAR; INTRAVENOUS ONCE
Status: COMPLETED | OUTPATIENT
Start: 2021-10-12 | End: 2021-10-12

## 2021-10-12 RX ORDER — SUCCINYLCHOLINE CHLORIDE 20 MG/ML
INJECTION INTRAMUSCULAR; INTRAVENOUS
Status: DISCONTINUED | OUTPATIENT
Start: 2021-10-12 | End: 2021-10-12

## 2021-10-12 RX ORDER — SODIUM CHLORIDE, SODIUM LACTATE, POTASSIUM CHLORIDE, CALCIUM CHLORIDE 600; 310; 30; 20 MG/100ML; MG/100ML; MG/100ML; MG/100ML
INJECTION, SOLUTION INTRAVENOUS CONTINUOUS PRN
Status: DISCONTINUED | OUTPATIENT
Start: 2021-10-12 | End: 2021-10-12

## 2021-10-12 RX ORDER — ONDANSETRON 2 MG/ML
4 INJECTION INTRAMUSCULAR; INTRAVENOUS DAILY PRN
Status: DISCONTINUED | OUTPATIENT
Start: 2021-10-12 | End: 2021-10-12 | Stop reason: HOSPADM

## 2021-10-12 RX ORDER — EPHEDRINE SULFATE 50 MG/ML
INJECTION, SOLUTION INTRAVENOUS
Status: DISCONTINUED | OUTPATIENT
Start: 2021-10-12 | End: 2021-10-12

## 2021-10-12 RX ORDER — FENTANYL CITRATE 50 UG/ML
INJECTION, SOLUTION INTRAMUSCULAR; INTRAVENOUS
Status: DISCONTINUED | OUTPATIENT
Start: 2021-10-12 | End: 2021-10-12

## 2021-10-12 RX ORDER — ROCURONIUM BROMIDE 10 MG/ML
INJECTION, SOLUTION INTRAVENOUS
Status: DISCONTINUED | OUTPATIENT
Start: 2021-10-12 | End: 2021-10-12

## 2021-10-12 RX ORDER — ISOSULFAN BLUE 50 MG/5ML
INJECTION, SOLUTION SUBCUTANEOUS
Status: DISCONTINUED | OUTPATIENT
Start: 2021-10-12 | End: 2021-10-12 | Stop reason: HOSPADM

## 2021-10-12 RX ORDER — ONDANSETRON 2 MG/ML
INJECTION INTRAMUSCULAR; INTRAVENOUS
Status: DISCONTINUED | OUTPATIENT
Start: 2021-10-12 | End: 2021-10-12

## 2021-10-12 RX ORDER — PROMETHAZINE HYDROCHLORIDE 25 MG/ML
6.25 INJECTION, SOLUTION INTRAMUSCULAR; INTRAVENOUS ONCE
Status: COMPLETED | OUTPATIENT
Start: 2021-10-12 | End: 2021-10-12

## 2021-10-12 RX ORDER — PROPOFOL 10 MG/ML
VIAL (ML) INTRAVENOUS
Status: DISCONTINUED | OUTPATIENT
Start: 2021-10-12 | End: 2021-10-12

## 2021-10-12 RX ORDER — FENTANYL CITRATE 50 UG/ML
25 INJECTION, SOLUTION INTRAMUSCULAR; INTRAVENOUS EVERY 5 MIN PRN
Status: COMPLETED | OUTPATIENT
Start: 2021-10-12 | End: 2021-10-12

## 2021-10-12 RX ADMIN — FENTANYL CITRATE 50 MCG: 50 INJECTION, SOLUTION INTRAMUSCULAR; INTRAVENOUS at 03:10

## 2021-10-12 RX ADMIN — FENTANYL CITRATE 25 MCG: 50 INJECTION INTRAMUSCULAR; INTRAVENOUS at 04:10

## 2021-10-12 RX ADMIN — ONDANSETRON 4 MG: 2 INJECTION INTRAMUSCULAR; INTRAVENOUS at 04:10

## 2021-10-12 RX ADMIN — FENTANYL CITRATE 50 MCG: 50 INJECTION, SOLUTION INTRAMUSCULAR; INTRAVENOUS at 02:10

## 2021-10-12 RX ADMIN — SUCCINYLCHOLINE CHLORIDE 100 MG: 20 INJECTION, SOLUTION INTRAMUSCULAR; INTRAVENOUS at 01:10

## 2021-10-12 RX ADMIN — PROPOFOL 120 MG: 10 INJECTION, EMULSION INTRAVENOUS at 01:10

## 2021-10-12 RX ADMIN — HYDRALAZINE HYDROCHLORIDE 10 MG: 20 INJECTION INTRAMUSCULAR; INTRAVENOUS at 04:10

## 2021-10-12 RX ADMIN — ROCURONIUM BROMIDE 5 MG: 10 INJECTION, SOLUTION INTRAVENOUS at 01:10

## 2021-10-12 RX ADMIN — SODIUM CHLORIDE, SODIUM LACTATE, POTASSIUM CHLORIDE, AND CALCIUM CHLORIDE: .6; .31; .03; .02 INJECTION, SOLUTION INTRAVENOUS at 01:10

## 2021-10-12 RX ADMIN — DEXAMETHASONE SODIUM PHOSPHATE 8 MG: 4 INJECTION, SOLUTION INTRAMUSCULAR; INTRAVENOUS at 02:10

## 2021-10-12 RX ADMIN — FENTANYL CITRATE 50 MCG: 50 INJECTION, SOLUTION INTRAMUSCULAR; INTRAVENOUS at 01:10

## 2021-10-12 RX ADMIN — CLINDAMYCIN PHOSPHATE 900 MG: 18 INJECTION, SOLUTION INTRAVENOUS at 02:10

## 2021-10-12 RX ADMIN — EPHEDRINE SULFATE 10 MG: 50 INJECTION INTRAVENOUS at 02:10

## 2021-10-12 RX ADMIN — EPHEDRINE SULFATE 15 MG: 50 INJECTION INTRAVENOUS at 02:10

## 2021-10-12 RX ADMIN — MIDAZOLAM HYDROCHLORIDE 2 MG: 1 INJECTION, SOLUTION INTRAMUSCULAR; INTRAVENOUS at 01:10

## 2021-10-12 RX ADMIN — LIDOCAINE HYDROCHLORIDE 100 MG: 20 INJECTION, SOLUTION INTRAVENOUS at 01:10

## 2021-10-12 RX ADMIN — EPHEDRINE SULFATE 25 MG: 50 INJECTION INTRAVENOUS at 02:10

## 2021-10-12 RX ADMIN — PROMETHAZINE HYDROCHLORIDE 6.25 MG: 25 INJECTION INTRAMUSCULAR; INTRAVENOUS at 04:10

## 2021-10-12 RX ADMIN — LIDOCAINE HYDROCHLORIDE 50 MG: 20 INJECTION, SOLUTION INTRAVENOUS at 03:10

## 2021-10-12 RX ADMIN — ONDANSETRON 4 MG: 2 INJECTION, SOLUTION INTRAMUSCULAR; INTRAVENOUS at 03:10

## 2021-10-14 ENCOUNTER — TELEPHONE (OUTPATIENT)
Dept: SURGERY | Facility: CLINIC | Age: 66
End: 2021-10-14

## 2021-10-19 ENCOUNTER — HOSPITAL ENCOUNTER (OUTPATIENT)
Dept: RADIOLOGY | Facility: HOSPITAL | Age: 66
Discharge: HOME | End: 2021-10-19
Attending: INTERNAL MEDICINE
Payer: MEDICARE

## 2021-10-19 ENCOUNTER — OFFICE VISIT (OUTPATIENT)
Dept: RADIATION ONCOLOGY | Facility: CLINIC | Age: 66
End: 2021-10-19
Payer: MEDICARE

## 2021-10-19 ENCOUNTER — HOSPITAL ENCOUNTER (OUTPATIENT)
Dept: RADIATION THERAPY | Facility: HOSPITAL | Age: 66
Discharge: HOME | End: 2021-10-19
Attending: INTERNAL MEDICINE
Payer: MEDICARE

## 2021-10-19 VITALS
DIASTOLIC BLOOD PRESSURE: 107 MMHG | BODY MASS INDEX: 31.93 KG/M2 | SYSTOLIC BLOOD PRESSURE: 168 MMHG | RESPIRATION RATE: 18 BRPM | WEIGHT: 173.5 LBS | HEART RATE: 105 BPM | HEIGHT: 62 IN | TEMPERATURE: 98 F | OXYGEN SATURATION: 98 %

## 2021-10-19 DIAGNOSIS — D05.12 DUCTAL CARCINOMA IN SITU (DCIS) OF LEFT BREAST: Primary | ICD-10-CM

## 2021-10-19 PROCEDURE — 4010F ACE/ARB THERAPY RXD/TAKEN: CPT | Mod: HCNC,CPTII,S$GLB, | Performed by: RADIOLOGY

## 2021-10-19 PROCEDURE — 77290 THER RAD SIMULAJ FIELD CPLX: CPT | Mod: TC,HCNC | Performed by: RADIOLOGY

## 2021-10-19 PROCEDURE — 3008F BODY MASS INDEX DOCD: CPT | Mod: HCNC,CPTII,S$GLB, | Performed by: RADIOLOGY

## 2021-10-19 PROCEDURE — 99999 PR PBB SHADOW E&M-EST. PATIENT-LVL III: CPT | Mod: PBBFAC,HCNC,, | Performed by: RADIOLOGY

## 2021-10-19 PROCEDURE — 99214 OFFICE O/P EST MOD 30 MIN: CPT | Mod: HCNC,25,S$GLB, | Performed by: RADIOLOGY

## 2021-10-19 PROCEDURE — 1159F PR MEDICATION LIST DOCUMENTED IN MEDICAL RECORD: ICD-10-PCS | Mod: HCNC,CPTII,S$GLB, | Performed by: RADIOLOGY

## 2021-10-19 PROCEDURE — 3044F HG A1C LEVEL LT 7.0%: CPT | Mod: HCNC,CPTII,S$GLB, | Performed by: RADIOLOGY

## 2021-10-19 PROCEDURE — 99214 PR OFFICE/OUTPT VISIT, EST, LEVL IV, 30-39 MIN: ICD-10-PCS | Mod: HCNC,25,S$GLB, | Performed by: RADIOLOGY

## 2021-10-19 PROCEDURE — 1159F MED LIST DOCD IN RCRD: CPT | Mod: HCNC,CPTII,S$GLB, | Performed by: RADIOLOGY

## 2021-10-19 PROCEDURE — 3008F PR BODY MASS INDEX (BMI) DOCUMENTED: ICD-10-PCS | Mod: HCNC,CPTII,S$GLB, | Performed by: RADIOLOGY

## 2021-10-19 PROCEDURE — 77334 PR  RADN TREATMENT AID(S) COMPLX: ICD-10-PCS | Mod: 26,HCNC,, | Performed by: RADIOLOGY

## 2021-10-19 PROCEDURE — 77290 THER RAD SIMULAJ FIELD CPLX: CPT | Mod: 26,HCNC,, | Performed by: RADIOLOGY

## 2021-10-19 PROCEDURE — 3288F PR FALLS RISK ASSESSMENT DOCUMENTED: ICD-10-PCS | Mod: HCNC,CPTII,S$GLB, | Performed by: RADIOLOGY

## 2021-10-19 PROCEDURE — 3066F NEPHROPATHY DOC TX: CPT | Mod: HCNC,CPTII,S$GLB, | Performed by: RADIOLOGY

## 2021-10-19 PROCEDURE — 3044F PR MOST RECENT HEMOGLOBIN A1C LEVEL <7.0%: ICD-10-PCS | Mod: HCNC,CPTII,S$GLB, | Performed by: RADIOLOGY

## 2021-10-19 PROCEDURE — 99499 UNLISTED E&M SERVICE: CPT | Mod: HCNC,S$GLB,, | Performed by: RADIOLOGY

## 2021-10-19 PROCEDURE — 3077F PR MOST RECENT SYSTOLIC BLOOD PRESSURE >= 140 MM HG: ICD-10-PCS | Mod: HCNC,CPTII,S$GLB, | Performed by: RADIOLOGY

## 2021-10-19 PROCEDURE — 3080F PR MOST RECENT DIASTOLIC BLOOD PRESSURE >= 90 MM HG: ICD-10-PCS | Mod: HCNC,CPTII,S$GLB, | Performed by: RADIOLOGY

## 2021-10-19 PROCEDURE — 1125F PR PAIN SEVERITY QUANTIFIED, PAIN PRESENT: ICD-10-PCS | Mod: HCNC,CPTII,S$GLB, | Performed by: RADIOLOGY

## 2021-10-19 PROCEDURE — 77333 RADIATION TREATMENT AID(S): CPT | Mod: TC,HCNC | Performed by: RADIOLOGY

## 2021-10-19 PROCEDURE — 77332 RADIATION TREATMENT AID(S): CPT | Mod: TC,HCNC | Performed by: RADIOLOGY

## 2021-10-19 PROCEDURE — 1101F PT FALLS ASSESS-DOCD LE1/YR: CPT | Mod: HCNC,CPTII,S$GLB, | Performed by: RADIOLOGY

## 2021-10-19 PROCEDURE — 99499 RISK ADDL DX/OHS AUDIT: ICD-10-PCS | Mod: HCNC,S$GLB,, | Performed by: RADIOLOGY

## 2021-10-19 PROCEDURE — 77290 PR  SET RADN THERAPY FIELD COMPLEX: ICD-10-PCS | Mod: 26,HCNC,, | Performed by: RADIOLOGY

## 2021-10-19 PROCEDURE — 1101F PR PT FALLS ASSESS DOC 0-1 FALLS W/OUT INJ PAST YR: ICD-10-PCS | Mod: HCNC,CPTII,S$GLB, | Performed by: RADIOLOGY

## 2021-10-19 PROCEDURE — 77014 HC CT GUIDANCE RADIATION THERAPY FLDS PLACEMENT: CPT | Mod: TC,HCNC | Performed by: RADIOLOGY

## 2021-10-19 PROCEDURE — 1125F AMNT PAIN NOTED PAIN PRSNT: CPT | Mod: HCNC,CPTII,S$GLB, | Performed by: RADIOLOGY

## 2021-10-19 PROCEDURE — 4010F PR ACE/ARB THEARPY RXD/TAKEN: ICD-10-PCS | Mod: HCNC,CPTII,S$GLB, | Performed by: RADIOLOGY

## 2021-10-19 PROCEDURE — 3080F DIAST BP >= 90 MM HG: CPT | Mod: HCNC,CPTII,S$GLB, | Performed by: RADIOLOGY

## 2021-10-19 PROCEDURE — 99999 PR PBB SHADOW E&M-EST. PATIENT-LVL III: ICD-10-PCS | Mod: PBBFAC,HCNC,, | Performed by: RADIOLOGY

## 2021-10-19 PROCEDURE — 77334 RADIATION TREATMENT AID(S): CPT | Mod: 26,HCNC,, | Performed by: RADIOLOGY

## 2021-10-19 PROCEDURE — 3066F PR DOCUMENTATION OF TREATMENT FOR NEPHROPATHY: ICD-10-PCS | Mod: HCNC,CPTII,S$GLB, | Performed by: RADIOLOGY

## 2021-10-19 PROCEDURE — 3077F SYST BP >= 140 MM HG: CPT | Mod: HCNC,CPTII,S$GLB, | Performed by: RADIOLOGY

## 2021-10-19 PROCEDURE — 3288F FALL RISK ASSESSMENT DOCD: CPT | Mod: HCNC,CPTII,S$GLB, | Performed by: RADIOLOGY

## 2021-10-20 LAB
FINAL PATHOLOGIC DIAGNOSIS: NORMAL
GROSS: NORMAL
Lab: NORMAL

## 2021-10-21 ENCOUNTER — TELEPHONE (OUTPATIENT)
Dept: SURGERY | Facility: CLINIC | Age: 66
End: 2021-10-21

## 2021-10-22 ENCOUNTER — TUMOR BOARD CONFERENCE (OUTPATIENT)
Dept: HEMATOLOGY/ONCOLOGY | Facility: CLINIC | Age: 66
End: 2021-10-22

## 2021-10-22 ENCOUNTER — TELEPHONE (OUTPATIENT)
Dept: RADIATION ONCOLOGY | Facility: CLINIC | Age: 66
End: 2021-10-22

## 2021-10-22 ENCOUNTER — TELEPHONE (OUTPATIENT)
Dept: TRANSPLANT | Facility: CLINIC | Age: 66
End: 2021-10-22
Payer: MEDICARE

## 2021-10-27 ENCOUNTER — OFFICE VISIT (OUTPATIENT)
Dept: HEMATOLOGY/ONCOLOGY | Facility: CLINIC | Age: 66
End: 2021-10-27
Payer: MEDICARE

## 2021-10-27 ENCOUNTER — DOCUMENTATION ONLY (OUTPATIENT)
Dept: HEMATOLOGY/ONCOLOGY | Facility: CLINIC | Age: 66
End: 2021-10-27
Payer: MEDICARE

## 2021-10-27 ENCOUNTER — OFFICE VISIT (OUTPATIENT)
Dept: SURGERY | Facility: CLINIC | Age: 66
End: 2021-10-27
Payer: MEDICARE

## 2021-10-27 VITALS
HEIGHT: 62 IN | SYSTOLIC BLOOD PRESSURE: 195 MMHG | OXYGEN SATURATION: 97 % | SYSTOLIC BLOOD PRESSURE: 142 MMHG | WEIGHT: 175.25 LBS | TEMPERATURE: 96 F | HEIGHT: 62 IN | HEART RATE: 104 BPM | BODY MASS INDEX: 32.25 KG/M2 | WEIGHT: 175 LBS | BODY MASS INDEX: 32.2 KG/M2 | TEMPERATURE: 98 F | HEART RATE: 111 BPM | DIASTOLIC BLOOD PRESSURE: 94 MMHG | DIASTOLIC BLOOD PRESSURE: 108 MMHG

## 2021-10-27 DIAGNOSIS — D05.12 DUCTAL CARCINOMA IN SITU (DCIS) OF LEFT BREAST: Primary | ICD-10-CM

## 2021-10-27 DIAGNOSIS — D84.9 IMMUNOCOMPROMISED PATIENT: ICD-10-CM

## 2021-10-27 DIAGNOSIS — D84.822 IMMUNODEFICIENCY SECONDARY TO RADIATION THERAPY: ICD-10-CM

## 2021-10-27 DIAGNOSIS — Z94.1 HEART TRANSPLANTED: Chronic | ICD-10-CM
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
## 2021-10-27 DIAGNOSIS — C50.912 DUCTAL CARCINOMA IN SITU (DCIS) OF LEFT BREAST WITH MICROINVASIVE COMPONENT: Primary | ICD-10-CM

## 2021-10-27 PROCEDURE — 99024 POSTOP FOLLOW-UP VISIT: CPT | Mod: HCNC,S$GLB,, | Performed by: SURGERY

## 2021-10-27 PROCEDURE — 1159F PR MEDICATION LIST DOCUMENTED IN MEDICAL RECORD: ICD-10-PCS | Mod: HCNC,CPTII,S$GLB, | Performed by: SURGERY

## 2021-10-27 PROCEDURE — 99499 RISK ADDL DX/OHS AUDIT: ICD-10-PCS | Mod: S$GLB,,, | Performed by: SURGERY

## 2021-10-27 PROCEDURE — 1159F MED LIST DOCD IN RCRD: CPT | Mod: HCNC,CPTII,S$GLB, | Performed by: SURGERY

## 2021-10-27 PROCEDURE — 3008F PR BODY MASS INDEX (BMI) DOCUMENTED: ICD-10-PCS | Mod: HCNC,CPTII,S$GLB, | Performed by: SURGERY

## 2021-10-27 PROCEDURE — 4010F ACE/ARB THERAPY RXD/TAKEN: CPT | Mod: HCNC,CPTII,S$GLB, | Performed by: SURGERY

## 2021-10-27 PROCEDURE — 1160F PR REVIEW ALL MEDS BY PRESCRIBER/CLIN PHARMACIST DOCUMENTED: ICD-10-PCS | Mod: HCNC,CPTII,S$GLB, | Performed by: SURGERY

## 2021-10-27 PROCEDURE — 3008F BODY MASS INDEX DOCD: CPT | Mod: HCNC,CPTII,S$GLB, | Performed by: SURGERY

## 2021-10-27 PROCEDURE — 3066F PR DOCUMENTATION OF TREATMENT FOR NEPHROPATHY: ICD-10-PCS | Mod: HCNC,CPTII,S$GLB, | Performed by: INTERNAL MEDICINE

## 2021-10-27 PROCEDURE — 3008F BODY MASS INDEX DOCD: CPT | Mod: HCNC,CPTII,S$GLB, | Performed by: INTERNAL MEDICINE

## 2021-10-27 PROCEDURE — 1126F PR PAIN SEVERITY QUANTIFIED, NO PAIN PRESENT: ICD-10-PCS | Mod: HCNC,CPTII,S$GLB, | Performed by: SURGERY

## 2021-10-27 PROCEDURE — 3080F PR MOST RECENT DIASTOLIC BLOOD PRESSURE >= 90 MM HG: ICD-10-PCS | Mod: HCNC,CPTII,S$GLB, | Performed by: INTERNAL MEDICINE

## 2021-10-27 PROCEDURE — 1126F AMNT PAIN NOTED NONE PRSNT: CPT | Mod: HCNC,CPTII,S$GLB, | Performed by: SURGERY

## 2021-10-27 PROCEDURE — 3066F PR DOCUMENTATION OF TREATMENT FOR NEPHROPATHY: ICD-10-PCS | Mod: HCNC,CPTII,S$GLB, | Performed by: SURGERY

## 2021-10-27 PROCEDURE — 3077F PR MOST RECENT SYSTOLIC BLOOD PRESSURE >= 140 MM HG: ICD-10-PCS | Mod: HCNC,CPTII,S$GLB, | Performed by: SURGERY

## 2021-10-27 PROCEDURE — 4010F ACE/ARB THERAPY RXD/TAKEN: CPT | Mod: HCNC,CPTII,S$GLB, | Performed by: INTERNAL MEDICINE

## 2021-10-27 PROCEDURE — 3066F NEPHROPATHY DOC TX: CPT | Mod: HCNC,CPTII,S$GLB, | Performed by: INTERNAL MEDICINE

## 2021-10-27 PROCEDURE — 3044F PR MOST RECENT HEMOGLOBIN A1C LEVEL <7.0%: ICD-10-PCS | Mod: HCNC,CPTII,S$GLB, | Performed by: INTERNAL MEDICINE

## 2021-10-27 PROCEDURE — 4010F PR ACE/ARB THEARPY RXD/TAKEN: ICD-10-PCS | Mod: HCNC,CPTII,S$GLB, | Performed by: SURGERY

## 2021-10-27 PROCEDURE — 3044F PR MOST RECENT HEMOGLOBIN A1C LEVEL <7.0%: ICD-10-PCS | Mod: HCNC,CPTII,S$GLB, | Performed by: SURGERY

## 2021-10-27 PROCEDURE — 99999 PR PBB SHADOW E&M-EST. PATIENT-LVL III: ICD-10-PCS | Mod: PBBFAC,HCNC,, | Performed by: SURGERY

## 2021-10-27 PROCEDURE — 3077F PR MOST RECENT SYSTOLIC BLOOD PRESSURE >= 140 MM HG: ICD-10-PCS | Mod: HCNC,CPTII,S$GLB, | Performed by: INTERNAL MEDICINE

## 2021-10-27 PROCEDURE — 99999 PR PBB SHADOW E&M-EST. PATIENT-LVL III: ICD-10-PCS | Mod: PBBFAC,HCNC,, | Performed by: INTERNAL MEDICINE

## 2021-10-27 PROCEDURE — 1160F RVW MEDS BY RX/DR IN RCRD: CPT | Mod: HCNC,CPTII,S$GLB, | Performed by: SURGERY

## 2021-10-27 PROCEDURE — 99024 PR POST-OP FOLLOW-UP VISIT: ICD-10-PCS | Mod: HCNC,S$GLB,, | Performed by: SURGERY

## 2021-10-27 PROCEDURE — 99214 OFFICE O/P EST MOD 30 MIN: CPT | Mod: HCNC,S$GLB,, | Performed by: INTERNAL MEDICINE

## 2021-10-27 PROCEDURE — 3080F DIAST BP >= 90 MM HG: CPT | Mod: HCNC,CPTII,S$GLB, | Performed by: INTERNAL MEDICINE

## 2021-10-27 PROCEDURE — 3080F PR MOST RECENT DIASTOLIC BLOOD PRESSURE >= 90 MM HG: ICD-10-PCS | Mod: HCNC,CPTII,S$GLB, | Performed by: SURGERY

## 2021-10-27 PROCEDURE — 1126F AMNT PAIN NOTED NONE PRSNT: CPT | Mod: HCNC,CPTII,S$GLB, | Performed by: INTERNAL MEDICINE

## 2021-10-27 PROCEDURE — 3077F SYST BP >= 140 MM HG: CPT | Mod: HCNC,CPTII,S$GLB, | Performed by: SURGERY

## 2021-10-27 PROCEDURE — 4010F PR ACE/ARB THEARPY RXD/TAKEN: ICD-10-PCS | Mod: HCNC,CPTII,S$GLB, | Performed by: INTERNAL MEDICINE

## 2021-10-27 PROCEDURE — 3077F SYST BP >= 140 MM HG: CPT | Mod: HCNC,CPTII,S$GLB, | Performed by: INTERNAL MEDICINE

## 2021-10-27 PROCEDURE — 3044F HG A1C LEVEL LT 7.0%: CPT | Mod: HCNC,CPTII,S$GLB, | Performed by: INTERNAL MEDICINE

## 2021-10-27 PROCEDURE — 99214 PR OFFICE/OUTPT VISIT, EST, LEVL IV, 30-39 MIN: ICD-10-PCS | Mod: HCNC,S$GLB,, | Performed by: INTERNAL MEDICINE

## 2021-10-27 PROCEDURE — 99999 PR PBB SHADOW E&M-EST. PATIENT-LVL III: CPT | Mod: PBBFAC,HCNC,, | Performed by: INTERNAL MEDICINE

## 2021-10-27 PROCEDURE — 3080F DIAST BP >= 90 MM HG: CPT | Mod: HCNC,CPTII,S$GLB, | Performed by: SURGERY

## 2021-10-27 PROCEDURE — 99999 PR PBB SHADOW E&M-EST. PATIENT-LVL III: CPT | Mod: PBBFAC,HCNC,, | Performed by: SURGERY

## 2021-10-27 PROCEDURE — 3044F HG A1C LEVEL LT 7.0%: CPT | Mod: HCNC,CPTII,S$GLB, | Performed by: SURGERY

## 2021-10-27 PROCEDURE — 3066F NEPHROPATHY DOC TX: CPT | Mod: HCNC,CPTII,S$GLB, | Performed by: SURGERY

## 2021-10-27 PROCEDURE — 99499 UNLISTED E&M SERVICE: CPT | Mod: S$GLB,,, | Performed by: SURGERY

## 2021-10-27 PROCEDURE — 1126F PR PAIN SEVERITY QUANTIFIED, NO PAIN PRESENT: ICD-10-PCS | Mod: HCNC,CPTII,S$GLB, | Performed by: INTERNAL MEDICINE

## 2021-10-27 PROCEDURE — 3008F PR BODY MASS INDEX (BMI) DOCUMENTED: ICD-10-PCS | Mod: HCNC,CPTII,S$GLB, | Performed by: INTERNAL MEDICINE

## 2021-10-27 RX ORDER — HEPARIN 100 UNIT/ML
500 SYRINGE INTRAVENOUS
Status: CANCELLED | OUTPATIENT
Start: 2021-11-16

## 2021-10-27 RX ORDER — SODIUM CHLORIDE 0.9 % (FLUSH) 0.9 %
10 SYRINGE (ML) INJECTION
Status: CANCELLED | OUTPATIENT
Start: 2021-11-16

## 2021-10-28 ENCOUNTER — IMMUNIZATION (OUTPATIENT)
Dept: PRIMARY CARE CLINIC | Facility: CLINIC | Age: 66
End: 2021-10-28
Payer: MEDICARE

## 2021-10-28 ENCOUNTER — OFFICE VISIT (OUTPATIENT)
Dept: HEMATOLOGY/ONCOLOGY | Facility: CLINIC | Age: 66
End: 2021-10-28
Payer: MEDICARE

## 2021-10-28 ENCOUNTER — DOCUMENTATION ONLY (OUTPATIENT)
Dept: HEMATOLOGY/ONCOLOGY | Facility: CLINIC | Age: 66
End: 2021-10-28
Payer: MEDICARE

## 2021-10-28 VITALS
RESPIRATION RATE: 16 BRPM | OXYGEN SATURATION: 93 % | SYSTOLIC BLOOD PRESSURE: 164 MMHG | BODY MASS INDEX: 31.16 KG/M2 | WEIGHT: 169.31 LBS | HEIGHT: 62 IN | HEART RATE: 91 BPM | DIASTOLIC BLOOD PRESSURE: 74 MMHG | TEMPERATURE: 98 F

## 2021-10-28 VITALS
SYSTOLIC BLOOD PRESSURE: 150 MMHG | HEART RATE: 103 BPM | TEMPERATURE: 98 F | HEIGHT: 62 IN | BODY MASS INDEX: 31.89 KG/M2 | WEIGHT: 173.31 LBS | DIASTOLIC BLOOD PRESSURE: 93 MMHG | OXYGEN SATURATION: 98 %

## 2021-10-28 DIAGNOSIS — D05.12 DUCTAL CARCINOMA IN SITU (DCIS) OF LEFT BREAST: Primary | ICD-10-CM

## 2021-10-28 DIAGNOSIS — Z23 NEED FOR VACCINATION: Primary | ICD-10-CM

## 2021-10-28 PROCEDURE — 3044F HG A1C LEVEL LT 7.0%: CPT | Mod: HCNC,CPTII,S$GLB, | Performed by: INTERNAL MEDICINE

## 2021-10-28 PROCEDURE — 3077F SYST BP >= 140 MM HG: CPT | Mod: HCNC,CPTII,S$GLB, | Performed by: INTERNAL MEDICINE

## 2021-10-28 PROCEDURE — 1159F MED LIST DOCD IN RCRD: CPT | Mod: HCNC,CPTII,S$GLB, | Performed by: INTERNAL MEDICINE

## 2021-10-28 PROCEDURE — 3288F PR FALLS RISK ASSESSMENT DOCUMENTED: ICD-10-PCS | Mod: HCNC,CPTII,S$GLB, | Performed by: INTERNAL MEDICINE

## 2021-10-28 PROCEDURE — 1159F PR MEDICATION LIST DOCUMENTED IN MEDICAL RECORD: ICD-10-PCS | Mod: HCNC,CPTII,S$GLB, | Performed by: INTERNAL MEDICINE

## 2021-10-28 PROCEDURE — 99215 OFFICE O/P EST HI 40 MIN: CPT | Mod: HCNC,S$GLB,, | Performed by: INTERNAL MEDICINE

## 2021-10-28 PROCEDURE — 1101F PR PT FALLS ASSESS DOC 0-1 FALLS W/OUT INJ PAST YR: ICD-10-PCS | Mod: HCNC,CPTII,S$GLB, | Performed by: INTERNAL MEDICINE

## 2021-10-28 PROCEDURE — 3288F FALL RISK ASSESSMENT DOCD: CPT | Mod: HCNC,CPTII,S$GLB, | Performed by: INTERNAL MEDICINE

## 2021-10-28 PROCEDURE — 1126F PR PAIN SEVERITY QUANTIFIED, NO PAIN PRESENT: ICD-10-PCS | Mod: HCNC,CPTII,S$GLB, | Performed by: INTERNAL MEDICINE

## 2021-10-28 PROCEDURE — 3080F PR MOST RECENT DIASTOLIC BLOOD PRESSURE >= 90 MM HG: ICD-10-PCS | Mod: HCNC,CPTII,S$GLB, | Performed by: INTERNAL MEDICINE

## 2021-10-28 PROCEDURE — 1126F AMNT PAIN NOTED NONE PRSNT: CPT | Mod: HCNC,CPTII,S$GLB, | Performed by: INTERNAL MEDICINE

## 2021-10-28 PROCEDURE — 3066F NEPHROPATHY DOC TX: CPT | Mod: HCNC,CPTII,S$GLB, | Performed by: INTERNAL MEDICINE

## 2021-10-28 PROCEDURE — 99215 PR OFFICE/OUTPT VISIT, EST, LEVL V, 40-54 MIN: ICD-10-PCS | Mod: HCNC,S$GLB,, | Performed by: INTERNAL MEDICINE

## 2021-10-28 PROCEDURE — 3080F DIAST BP >= 90 MM HG: CPT | Mod: HCNC,CPTII,S$GLB, | Performed by: INTERNAL MEDICINE

## 2021-10-28 PROCEDURE — 4010F PR ACE/ARB THEARPY RXD/TAKEN: ICD-10-PCS | Mod: HCNC,CPTII,S$GLB, | Performed by: INTERNAL MEDICINE

## 2021-10-28 PROCEDURE — 99999 PR PBB SHADOW E&M-EST. PATIENT-LVL III: CPT | Mod: PBBFAC,HCNC,, | Performed by: INTERNAL MEDICINE

## 2021-10-28 PROCEDURE — 0013A COVID-19, MRNA, LNP-S, PF, 100 MCG/0.25 ML DOSE VACCINE (MODERNA BOOSTER): CPT | Mod: CV19,PBBFAC | Performed by: FAMILY MEDICINE

## 2021-10-28 PROCEDURE — 3008F PR BODY MASS INDEX (BMI) DOCUMENTED: ICD-10-PCS | Mod: HCNC,CPTII,S$GLB, | Performed by: INTERNAL MEDICINE

## 2021-10-28 PROCEDURE — 91301 COVID-19, MRNA, LNP-S, PF, 100 MCG/0.25 ML DOSE VACCINE (MODERNA BOOSTER): CPT | Mod: PBBFAC | Performed by: FAMILY MEDICINE

## 2021-10-28 PROCEDURE — 1101F PT FALLS ASSESS-DOCD LE1/YR: CPT | Mod: HCNC,CPTII,S$GLB, | Performed by: INTERNAL MEDICINE

## 2021-10-28 PROCEDURE — 4010F ACE/ARB THERAPY RXD/TAKEN: CPT | Mod: HCNC,CPTII,S$GLB, | Performed by: INTERNAL MEDICINE

## 2021-10-28 PROCEDURE — 3044F PR MOST RECENT HEMOGLOBIN A1C LEVEL <7.0%: ICD-10-PCS | Mod: HCNC,CPTII,S$GLB, | Performed by: INTERNAL MEDICINE

## 2021-10-28 PROCEDURE — 3077F PR MOST RECENT SYSTOLIC BLOOD PRESSURE >= 140 MM HG: ICD-10-PCS | Mod: HCNC,CPTII,S$GLB, | Performed by: INTERNAL MEDICINE

## 2021-10-28 PROCEDURE — 99999 PR PBB SHADOW E&M-EST. PATIENT-LVL III: ICD-10-PCS | Mod: PBBFAC,HCNC,, | Performed by: INTERNAL MEDICINE

## 2021-10-28 PROCEDURE — 3066F PR DOCUMENTATION OF TREATMENT FOR NEPHROPATHY: ICD-10-PCS | Mod: HCNC,CPTII,S$GLB, | Performed by: INTERNAL MEDICINE

## 2021-10-28 PROCEDURE — 3008F BODY MASS INDEX DOCD: CPT | Mod: HCNC,CPTII,S$GLB, | Performed by: INTERNAL MEDICINE

## 2021-10-29 ENCOUNTER — LAB VISIT (OUTPATIENT)
Dept: LAB | Facility: HOSPITAL | Age: 66
End: 2021-10-29
Attending: INTERNAL MEDICINE
Payer: MEDICARE

## 2021-10-29 DIAGNOSIS — D05.12 DUCTAL CARCINOMA IN SITU (DCIS) OF LEFT BREAST: ICD-10-CM

## 2021-10-29 PROCEDURE — 36415 COLL VENOUS BLD VENIPUNCTURE: CPT | Mod: HCNC | Performed by: INTERNAL MEDICINE

## 2021-11-01 ENCOUNTER — TELEPHONE (OUTPATIENT)
Dept: PRIMARY CARE CLINIC | Facility: CLINIC | Age: 66
End: 2021-11-01
Payer: MEDICARE

## 2021-11-01 DIAGNOSIS — F51.01 PRIMARY INSOMNIA: ICD-10-CM

## 2021-11-01 RX ORDER — ZOLPIDEM TARTRATE 10 MG/1
TABLET ORAL
Qty: 90 TABLET | Refills: 1 | Status: SHIPPED | OUTPATIENT
Start: 2021-11-01 | End: 2022-04-18 | Stop reason: SDUPTHER

## 2021-11-01 RX ORDER — ZOLPIDEM TARTRATE 10 MG/1
TABLET ORAL
Qty: 90 TABLET | Refills: 1 | Status: CANCELLED | OUTPATIENT
Start: 2021-11-01

## 2021-11-01 RX ORDER — ZOLPIDEM TARTRATE 10 MG/1
TABLET ORAL
Qty: 90 TABLET | Refills: 1 | OUTPATIENT
Start: 2021-11-01

## 2021-11-01 RX ORDER — ZOLPIDEM TARTRATE 10 MG/1
TABLET ORAL
Qty: 90 TABLET | Refills: 0 | OUTPATIENT
Start: 2021-11-01

## 2021-11-02 ENCOUNTER — DOCUMENTATION ONLY (OUTPATIENT)
Dept: HEMATOLOGY/ONCOLOGY | Facility: CLINIC | Age: 66
End: 2021-11-02
Payer: MEDICARE

## 2021-11-03 ENCOUNTER — DOCUMENTATION ONLY (OUTPATIENT)
Dept: HEMATOLOGY/ONCOLOGY | Facility: CLINIC | Age: 66
End: 2021-11-03
Payer: MEDICARE

## 2021-11-03 LAB
FINAL PATHOLOGIC DIAGNOSIS: NORMAL
GROSS: NORMAL
Lab: NORMAL
MICROSCOPIC EXAM: NORMAL
SUPPLEMENTAL DIAGNOSIS: NORMAL

## 2021-11-04 ENCOUNTER — TELEPHONE (OUTPATIENT)
Dept: PREADMISSION TESTING | Facility: HOSPITAL | Age: 66
End: 2021-11-04
Payer: MEDICARE

## 2021-11-04 ENCOUNTER — TELEPHONE (OUTPATIENT)
Dept: HEMATOLOGY/ONCOLOGY | Facility: CLINIC | Age: 66
End: 2021-11-04
Payer: MEDICARE

## 2021-11-04 DIAGNOSIS — C50.912 DUCTAL CARCINOMA IN SITU OF BREAST WITH MICROINVASIVE COMPONENT, LEFT: Primary | ICD-10-CM

## 2021-11-04 DIAGNOSIS — Z01.818 PREOP TESTING: Primary | ICD-10-CM

## 2021-11-05 ENCOUNTER — LAB VISIT (OUTPATIENT)
Dept: LAB | Facility: HOSPITAL | Age: 66
End: 2021-11-05
Attending: SURGERY
Payer: MEDICARE

## 2021-11-05 ENCOUNTER — TUMOR BOARD CONFERENCE (OUTPATIENT)
Dept: HEMATOLOGY/ONCOLOGY | Facility: CLINIC | Age: 66
End: 2021-11-05
Payer: MEDICARE

## 2021-11-05 ENCOUNTER — ANESTHESIA EVENT (OUTPATIENT)
Dept: SURGERY | Facility: HOSPITAL | Age: 66
End: 2021-11-05
Payer: MEDICARE

## 2021-11-05 DIAGNOSIS — Z01.818 PREOP TESTING: ICD-10-CM

## 2021-11-05 LAB
ALBUMIN SERPL BCP-MCNC: 3.4 G/DL (ref 3.5–5.2)
ALP SERPL-CCNC: 99 U/L (ref 55–135)
ALT SERPL W/O P-5'-P-CCNC: 21 U/L (ref 10–44)
ANION GAP SERPL CALC-SCNC: 12 MMOL/L (ref 8–16)
AST SERPL-CCNC: 31 U/L (ref 10–40)
BASOPHILS # BLD AUTO: 0.02 K/UL (ref 0–0.2)
BASOPHILS NFR BLD: 0.4 % (ref 0–1.9)
BILIRUB SERPL-MCNC: 0.6 MG/DL (ref 0.1–1)
BUN SERPL-MCNC: 35 MG/DL (ref 8–23)
CALCIUM SERPL-MCNC: 9.2 MG/DL (ref 8.7–10.5)
CHLORIDE SERPL-SCNC: 104 MMOL/L (ref 95–110)
CO2 SERPL-SCNC: 25 MMOL/L (ref 23–29)
CREAT SERPL-MCNC: 2.4 MG/DL (ref 0.5–1.4)
DIFFERENTIAL METHOD: ABNORMAL
EOSINOPHIL # BLD AUTO: 0.2 K/UL (ref 0–0.5)
EOSINOPHIL NFR BLD: 3.5 % (ref 0–8)
ERYTHROCYTE [DISTWIDTH] IN BLOOD BY AUTOMATED COUNT: 15.4 % (ref 11.5–14.5)
EST. GFR  (AFRICAN AMERICAN): 23.4 ML/MIN/1.73 M^2
EST. GFR  (NON AFRICAN AMERICAN): 20.3 ML/MIN/1.73 M^2
GLUCOSE SERPL-MCNC: 97 MG/DL (ref 70–110)
HCT VFR BLD AUTO: 36.2 % (ref 37–48.5)
HGB BLD-MCNC: 10.7 G/DL (ref 12–16)
IMM GRANULOCYTES # BLD AUTO: 0.02 K/UL (ref 0–0.04)
IMM GRANULOCYTES NFR BLD AUTO: 0.4 % (ref 0–0.5)
LYMPHOCYTES # BLD AUTO: 1.6 K/UL (ref 1–4.8)
LYMPHOCYTES NFR BLD: 34.3 % (ref 18–48)
MCH RBC QN AUTO: 28.3 PG (ref 27–31)
MCHC RBC AUTO-ENTMCNC: 29.6 G/DL (ref 32–36)
MCV RBC AUTO: 96 FL (ref 82–98)
MONOCYTES # BLD AUTO: 0.5 K/UL (ref 0.3–1)
MONOCYTES NFR BLD: 10.5 % (ref 4–15)
NEUTROPHILS # BLD AUTO: 2.3 K/UL (ref 1.8–7.7)
NEUTROPHILS NFR BLD: 50.9 % (ref 38–73)
NRBC BLD-RTO: 0 /100 WBC
PLATELET # BLD AUTO: 315 K/UL (ref 150–450)
PMV BLD AUTO: 9.9 FL (ref 9.2–12.9)
POTASSIUM SERPL-SCNC: 5 MMOL/L (ref 3.5–5.1)
PROT SERPL-MCNC: 8.4 G/DL (ref 6–8.4)
RBC # BLD AUTO: 3.78 M/UL (ref 4–5.4)
SODIUM SERPL-SCNC: 141 MMOL/L (ref 136–145)
WBC # BLD AUTO: 4.55 K/UL (ref 3.9–12.7)

## 2021-11-05 PROCEDURE — 36415 COLL VENOUS BLD VENIPUNCTURE: CPT | Mod: HCNC | Performed by: SURGERY

## 2021-11-05 PROCEDURE — 80053 COMPREHEN METABOLIC PANEL: CPT | Mod: HCNC | Performed by: SURGERY

## 2021-11-05 PROCEDURE — 85025 COMPLETE CBC W/AUTO DIFF WBC: CPT | Mod: HCNC | Performed by: SURGERY

## 2021-11-08 ENCOUNTER — TELEPHONE (OUTPATIENT)
Dept: TRANSPLANT | Facility: CLINIC | Age: 66
End: 2021-11-08
Payer: MEDICARE

## 2021-11-09 ENCOUNTER — ANESTHESIA (OUTPATIENT)
Dept: SURGERY | Facility: HOSPITAL | Age: 66
End: 2021-11-09
Payer: MEDICARE

## 2021-11-09 ENCOUNTER — TELEPHONE (OUTPATIENT)
Dept: HEMATOLOGY/ONCOLOGY | Facility: CLINIC | Age: 66
End: 2021-11-09
Payer: MEDICARE

## 2021-11-09 ENCOUNTER — HOSPITAL ENCOUNTER (OUTPATIENT)
Facility: HOSPITAL | Age: 66
Discharge: HOME | End: 2021-11-09
Attending: SURGERY | Admitting: SURGERY
Payer: MEDICARE

## 2021-11-09 ENCOUNTER — HOSPITAL ENCOUNTER (OUTPATIENT)
Dept: RADIOLOGY | Facility: HOSPITAL | Age: 66
Discharge: HOME | End: 2021-11-09
Attending: SURGERY | Admitting: SURGERY
Payer: MEDICARE

## 2021-11-09 VITALS
RESPIRATION RATE: 20 BRPM | SYSTOLIC BLOOD PRESSURE: 133 MMHG | HEART RATE: 90 BPM | DIASTOLIC BLOOD PRESSURE: 78 MMHG | OXYGEN SATURATION: 98 % | HEIGHT: 62 IN | BODY MASS INDEX: 31.55 KG/M2 | WEIGHT: 171.44 LBS | TEMPERATURE: 98 F

## 2021-11-09 DIAGNOSIS — D05.12 DUCTAL CARCINOMA IN SITU (DCIS) OF LEFT BREAST: Primary | ICD-10-CM

## 2021-11-09 PROCEDURE — C1788 PORT, INDWELLING, IMP: HCPCS | Mod: HCNC | Performed by: SURGERY

## 2021-11-09 PROCEDURE — 37000008 HC ANESTHESIA 1ST 15 MINUTES: Mod: HCNC | Performed by: SURGERY

## 2021-11-09 PROCEDURE — 36561 INSERT TUNNELED CV CATH: CPT | Mod: HCNC,79,RT, | Performed by: SURGERY

## 2021-11-09 PROCEDURE — 77001 FLUOROGUIDE FOR VEIN DEVICE: CPT | Mod: 26,HCNC,79, | Performed by: SURGERY

## 2021-11-09 PROCEDURE — 71000033 HC RECOVERY, INTIAL HOUR: Mod: HCNC | Performed by: SURGERY

## 2021-11-09 PROCEDURE — 25000003 PHARM REV CODE 250: Mod: HCNC | Performed by: SURGERY

## 2021-11-09 PROCEDURE — 25000003 PHARM REV CODE 250: Mod: HCNC | Performed by: NURSE ANESTHETIST, CERTIFIED REGISTERED

## 2021-11-09 PROCEDURE — 71045 X-RAY EXAM CHEST 1 VIEW: CPT | Mod: TC,HCNC,59

## 2021-11-09 PROCEDURE — 63600175 PHARM REV CODE 636 W HCPCS: Mod: HCNC | Performed by: NURSE ANESTHETIST, CERTIFIED REGISTERED

## 2021-11-09 PROCEDURE — 37000009 HC ANESTHESIA EA ADD 15 MINS: Mod: HCNC | Performed by: SURGERY

## 2021-11-09 PROCEDURE — 63600175 PHARM REV CODE 636 W HCPCS: Mod: HCNC | Performed by: SURGERY

## 2021-11-09 PROCEDURE — 71045 X-RAY EXAM CHEST 1 VIEW: CPT | Mod: 26,HCNC,, | Performed by: RADIOLOGY

## 2021-11-09 PROCEDURE — 77001 CHG FLUOROGUIDE CNTRL VEN ACCESS,PLACE,REPLACE,REMOVE: ICD-10-PCS | Mod: 26,HCNC,79, | Performed by: SURGERY

## 2021-11-09 PROCEDURE — 63600175 PHARM REV CODE 636 W HCPCS: Mod: HCNC | Performed by: ANESTHESIOLOGY

## 2021-11-09 PROCEDURE — 71000015 HC POSTOP RECOV 1ST HR: Mod: HCNC | Performed by: SURGERY

## 2021-11-09 PROCEDURE — 36000706: Mod: HCNC | Performed by: SURGERY

## 2021-11-09 PROCEDURE — D9220A PRA ANESTHESIA: Mod: HCNC,,, | Performed by: NURSE ANESTHETIST, CERTIFIED REGISTERED

## 2021-11-09 PROCEDURE — 36000707: Mod: HCNC | Performed by: SURGERY

## 2021-11-09 PROCEDURE — 71045 XR CHEST AP PORTABLE: ICD-10-PCS | Mod: 26,HCNC,, | Performed by: RADIOLOGY

## 2021-11-09 PROCEDURE — 36561 PR INSERT TUNNELED CV CATH WITH PORT: ICD-10-PCS | Mod: HCNC,79,RT, | Performed by: SURGERY

## 2021-11-09 PROCEDURE — D9220A PRA ANESTHESIA: ICD-10-PCS | Mod: HCNC,,, | Performed by: NURSE ANESTHETIST, CERTIFIED REGISTERED

## 2021-11-09 DEVICE — PORT POWER CLEAR VIEW: Type: IMPLANTABLE DEVICE | Site: SUBCLAVIAN | Status: FUNCTIONAL

## 2021-11-09 RX ORDER — ALBUTEROL SULFATE 0.83 MG/ML
2.5 SOLUTION RESPIRATORY (INHALATION) EVERY 4 HOURS PRN
Status: DISCONTINUED | OUTPATIENT
Start: 2021-11-09 | End: 2021-11-09 | Stop reason: HOSPADM

## 2021-11-09 RX ORDER — DIPHENHYDRAMINE HYDROCHLORIDE 50 MG/ML
25 INJECTION INTRAMUSCULAR; INTRAVENOUS EVERY 6 HOURS PRN
Status: DISCONTINUED | OUTPATIENT
Start: 2021-11-09 | End: 2021-11-09 | Stop reason: HOSPADM

## 2021-11-09 RX ORDER — ONDANSETRON 2 MG/ML
4 INJECTION INTRAMUSCULAR; INTRAVENOUS ONCE AS NEEDED
Status: COMPLETED | OUTPATIENT
Start: 2021-11-09 | End: 2021-11-09

## 2021-11-09 RX ORDER — HEPARIN 100 UNIT/ML
SYRINGE INTRAVENOUS
Status: DISCONTINUED | OUTPATIENT
Start: 2021-11-09 | End: 2021-11-09 | Stop reason: HOSPADM

## 2021-11-09 RX ORDER — MEPERIDINE HYDROCHLORIDE 25 MG/ML
12.5 INJECTION INTRAMUSCULAR; INTRAVENOUS; SUBCUTANEOUS ONCE
Status: DISCONTINUED | OUTPATIENT
Start: 2021-11-09 | End: 2021-11-09 | Stop reason: HOSPADM

## 2021-11-09 RX ORDER — PROPOFOL 10 MG/ML
INJECTION, EMULSION INTRAVENOUS
Status: DISCONTINUED | OUTPATIENT
Start: 2021-11-09 | End: 2021-11-09

## 2021-11-09 RX ORDER — SODIUM CHLORIDE 9 MG/ML
INJECTION, SOLUTION INTRAMUSCULAR; INTRAVENOUS; SUBCUTANEOUS
Status: DISCONTINUED
Start: 2021-11-09 | End: 2021-11-09 | Stop reason: HOSPADM

## 2021-11-09 RX ORDER — BUPIVACAINE HYDROCHLORIDE 2.5 MG/ML
INJECTION, SOLUTION EPIDURAL; INFILTRATION; INTRACAUDAL
Status: DISCONTINUED
Start: 2021-11-09 | End: 2021-11-09 | Stop reason: HOSPADM

## 2021-11-09 RX ORDER — LIDOCAINE HCL/PF 100 MG/5ML
SYRINGE (ML) INTRAVENOUS
Status: DISCONTINUED | OUTPATIENT
Start: 2021-11-09 | End: 2021-11-09

## 2021-11-09 RX ORDER — SODIUM CHLORIDE, SODIUM LACTATE, POTASSIUM CHLORIDE, CALCIUM CHLORIDE 600; 310; 30; 20 MG/100ML; MG/100ML; MG/100ML; MG/100ML
INJECTION, SOLUTION INTRAVENOUS CONTINUOUS
Status: DISCONTINUED | OUTPATIENT
Start: 2021-11-09 | End: 2023-03-28

## 2021-11-09 RX ORDER — TRAMADOL HYDROCHLORIDE 50 MG/1
50 TABLET ORAL EVERY 8 HOURS PRN
Qty: 10 TABLET | Refills: 0 | Status: ON HOLD | OUTPATIENT
Start: 2021-11-09 | End: 2021-12-25 | Stop reason: HOSPADM

## 2021-11-09 RX ORDER — HEPARIN 100 UNIT/ML
SYRINGE INTRAVENOUS
Status: DISCONTINUED
Start: 2021-11-09 | End: 2021-11-09 | Stop reason: HOSPADM

## 2021-11-09 RX ORDER — SODIUM CHLORIDE 9 MG/ML
INJECTION, SOLUTION INTRAVENOUS CONTINUOUS
Status: DISCONTINUED | OUTPATIENT
Start: 2021-11-09 | End: 2021-11-09 | Stop reason: HOSPADM

## 2021-11-09 RX ORDER — FENTANYL CITRATE 50 UG/ML
25 INJECTION, SOLUTION INTRAMUSCULAR; INTRAVENOUS EVERY 5 MIN PRN
Status: DISCONTINUED | OUTPATIENT
Start: 2021-11-09 | End: 2021-11-09 | Stop reason: HOSPADM

## 2021-11-09 RX ORDER — ONDANSETRON 2 MG/ML
INJECTION INTRAMUSCULAR; INTRAVENOUS
Status: DISCONTINUED | OUTPATIENT
Start: 2021-11-09 | End: 2021-11-09

## 2021-11-09 RX ORDER — FENTANYL CITRATE 50 UG/ML
INJECTION, SOLUTION INTRAMUSCULAR; INTRAVENOUS
Status: DISCONTINUED | OUTPATIENT
Start: 2021-11-09 | End: 2021-11-09

## 2021-11-09 RX ORDER — LIDOCAINE HYDROCHLORIDE 10 MG/ML
INJECTION, SOLUTION EPIDURAL; INFILTRATION; INTRACAUDAL; PERINEURAL
Status: DISCONTINUED
Start: 2021-11-09 | End: 2021-11-09 | Stop reason: HOSPADM

## 2021-11-09 RX ORDER — ONDANSETRON 2 MG/ML
4 INJECTION INTRAMUSCULAR; INTRAVENOUS EVERY 12 HOURS PRN
Status: DISCONTINUED | OUTPATIENT
Start: 2021-11-09 | End: 2021-11-09 | Stop reason: HOSPADM

## 2021-11-09 RX ORDER — MIDAZOLAM HYDROCHLORIDE 1 MG/ML
INJECTION INTRAMUSCULAR; INTRAVENOUS
Status: DISCONTINUED | OUTPATIENT
Start: 2021-11-09 | End: 2021-11-09

## 2021-11-09 RX ORDER — LIDOCAINE HYDROCHLORIDE 10 MG/ML
INJECTION, SOLUTION EPIDURAL; INFILTRATION; INTRACAUDAL; PERINEURAL
Status: DISCONTINUED | OUTPATIENT
Start: 2021-11-09 | End: 2021-11-09 | Stop reason: HOSPADM

## 2021-11-09 RX ORDER — TRAMADOL HYDROCHLORIDE 50 MG/1
50 TABLET ORAL EVERY 8 HOURS PRN
Status: DISCONTINUED | OUTPATIENT
Start: 2021-11-09 | End: 2021-11-09 | Stop reason: HOSPADM

## 2021-11-09 RX ORDER — BUPIVACAINE HYDROCHLORIDE 2.5 MG/ML
INJECTION, SOLUTION EPIDURAL; INFILTRATION; INTRACAUDAL
Status: DISCONTINUED | OUTPATIENT
Start: 2021-11-09 | End: 2021-11-09 | Stop reason: HOSPADM

## 2021-11-09 RX ADMIN — MIDAZOLAM HYDROCHLORIDE 1 MG: 1 INJECTION INTRAMUSCULAR; INTRAVENOUS at 08:11

## 2021-11-09 RX ADMIN — FENTANYL CITRATE 25 MCG: 50 INJECTION, SOLUTION INTRAMUSCULAR; INTRAVENOUS at 08:11

## 2021-11-09 RX ADMIN — Medication 40 MG: at 08:11

## 2021-11-09 RX ADMIN — PROPOFOL 20 MG: 10 INJECTION, EMULSION INTRAVENOUS at 08:11

## 2021-11-09 RX ADMIN — ONDANSETRON 4 MG: 2 INJECTION, SOLUTION INTRAMUSCULAR; INTRAVENOUS at 08:11

## 2021-11-09 RX ADMIN — FENTANYL CITRATE 25 MCG: 50 INJECTION INTRAMUSCULAR; INTRAVENOUS at 09:11

## 2021-11-09 RX ADMIN — DEXTROSE 2 G: 50 INJECTION, SOLUTION INTRAVENOUS at 08:11

## 2021-11-09 RX ADMIN — ONDANSETRON HYDROCHLORIDE 4 MG: 2 SOLUTION INTRAMUSCULAR; INTRAVENOUS at 09:11

## 2021-11-09 RX ADMIN — SODIUM CHLORIDE, SODIUM LACTATE, POTASSIUM CHLORIDE, AND CALCIUM CHLORIDE: .6; .31; .03; .02 INJECTION, SOLUTION INTRAVENOUS at 07:11

## 2021-11-10 ENCOUNTER — DOCUMENTATION ONLY (OUTPATIENT)
Dept: HEMATOLOGY/ONCOLOGY | Facility: CLINIC | Age: 66
End: 2021-11-10
Payer: MEDICARE

## 2021-11-10 DIAGNOSIS — D05.12 DUCTAL CARCINOMA IN SITU (DCIS) OF LEFT BREAST: Primary | ICD-10-CM

## 2021-11-11 RX ORDER — OMEPRAZOLE 10 MG/1
CAPSULE, DELAYED RELEASE ORAL
Qty: 90 CAPSULE | Refills: 1 | Status: SHIPPED | OUTPATIENT
Start: 2021-11-11 | End: 2021-12-16

## 2021-11-12 ENCOUNTER — CLINICAL SUPPORT (OUTPATIENT)
Dept: HEMATOLOGY/ONCOLOGY | Facility: CLINIC | Age: 66
End: 2021-11-12
Payer: MEDICARE

## 2021-11-12 DIAGNOSIS — Z71.9 ENCOUNTER FOR EDUCATION: Primary | ICD-10-CM

## 2021-11-12 DIAGNOSIS — D05.12 DUCTAL CARCINOMA IN SITU (DCIS) OF LEFT BREAST: ICD-10-CM

## 2021-11-12 DIAGNOSIS — D05.12 DUCTAL CARCINOMA IN SITU (DCIS) OF LEFT BREAST: Primary | ICD-10-CM

## 2021-11-12 PROCEDURE — 99499 NO LOS: ICD-10-PCS | Mod: HCNC,S$GLB,,

## 2021-11-12 PROCEDURE — 99499 UNLISTED E&M SERVICE: CPT | Mod: HCNC,S$GLB,,

## 2021-11-12 RX ORDER — DEXAMETHASONE 4 MG/1
8 TABLET ORAL DAILY
Qty: 24 TABLET | Refills: 3 | Status: SHIPPED | OUTPATIENT
Start: 2021-11-13 | End: 2021-12-16

## 2021-11-12 RX ORDER — OLANZAPINE 5 MG/1
5 TABLET ORAL NIGHTLY
Qty: 30 TABLET | Refills: 2 | Status: SHIPPED | OUTPATIENT
Start: 2021-11-12 | End: 2021-11-22

## 2021-11-12 RX ORDER — ONDANSETRON 8 MG/1
8 TABLET, ORALLY DISINTEGRATING ORAL EVERY 8 HOURS PRN
Qty: 60 TABLET | Refills: 5 | Status: SHIPPED | OUTPATIENT
Start: 2021-11-12 | End: 2022-10-05

## 2021-11-12 RX ORDER — PROMETHAZINE HYDROCHLORIDE 25 MG/1
25 TABLET ORAL EVERY 8 HOURS PRN
Qty: 30 TABLET | Refills: 5 | Status: SHIPPED | OUTPATIENT
Start: 2021-11-12 | End: 2021-11-22

## 2021-11-15 ENCOUNTER — DOCUMENTATION ONLY (OUTPATIENT)
Dept: HEMATOLOGY/ONCOLOGY | Facility: CLINIC | Age: 66
End: 2021-11-15
Payer: MEDICARE

## 2021-11-15 ENCOUNTER — INFUSION (OUTPATIENT)
Dept: INFUSION THERAPY | Facility: HOSPITAL | Age: 66
End: 2021-11-15
Attending: NURSE PRACTITIONER
Payer: MEDICARE

## 2021-11-15 DIAGNOSIS — Z03.818 ENCNTR FOR OBS FOR SUSP EXPSR TO OTH BIOLG AGENTS RULED OUT: Primary | ICD-10-CM

## 2021-11-16 ENCOUNTER — OFFICE VISIT (OUTPATIENT)
Dept: HEMATOLOGY/ONCOLOGY | Facility: CLINIC | Age: 66
End: 2021-11-16
Payer: MEDICARE

## 2021-11-16 ENCOUNTER — INFUSION (OUTPATIENT)
Dept: INFUSION THERAPY | Facility: HOSPITAL | Age: 66
End: 2021-11-16
Attending: NURSE PRACTITIONER
Payer: MEDICARE

## 2021-11-16 VITALS
SYSTOLIC BLOOD PRESSURE: 142 MMHG | OXYGEN SATURATION: 98 % | RESPIRATION RATE: 16 BRPM | TEMPERATURE: 98 F | HEART RATE: 91 BPM | DIASTOLIC BLOOD PRESSURE: 86 MMHG

## 2021-11-16 VITALS
SYSTOLIC BLOOD PRESSURE: 150 MMHG | OXYGEN SATURATION: 98 % | WEIGHT: 173.31 LBS | HEART RATE: 99 BPM | BODY MASS INDEX: 31.89 KG/M2 | TEMPERATURE: 98 F | DIASTOLIC BLOOD PRESSURE: 94 MMHG | HEIGHT: 62 IN

## 2021-11-16 DIAGNOSIS — D05.12 DUCTAL CARCINOMA IN SITU (DCIS) OF LEFT BREAST: ICD-10-CM

## 2021-11-16 DIAGNOSIS — D05.12 DUCTAL CARCINOMA IN SITU (DCIS) OF LEFT BREAST: Primary | ICD-10-CM

## 2021-11-16 DIAGNOSIS — Z03.818 ENCNTR FOR OBS FOR SUSP EXPSR TO OTH BIOLG AGENTS RULED OUT: Primary | ICD-10-CM

## 2021-11-16 LAB — SARS-COV-2 RDRP RESP QL NAA+PROBE: NEGATIVE

## 2021-11-16 PROCEDURE — 3008F BODY MASS INDEX DOCD: CPT | Mod: HCNC,CPTII,S$GLB, | Performed by: NURSE PRACTITIONER

## 2021-11-16 PROCEDURE — 99999 PR PBB SHADOW E&M-EST. PATIENT-LVL V: CPT | Mod: PBBFAC,HCNC,, | Performed by: NURSE PRACTITIONER

## 2021-11-16 PROCEDURE — 96415 CHEMO IV INFUSION ADDL HR: CPT | Mod: HCNC

## 2021-11-16 PROCEDURE — 96367 TX/PROPH/DG ADDL SEQ IV INF: CPT | Mod: HCNC

## 2021-11-16 PROCEDURE — 3077F PR MOST RECENT SYSTOLIC BLOOD PRESSURE >= 140 MM HG: ICD-10-PCS | Mod: HCNC,CPTII,S$GLB, | Performed by: NURSE PRACTITIONER

## 2021-11-16 PROCEDURE — 3077F SYST BP >= 140 MM HG: CPT | Mod: HCNC,CPTII,S$GLB, | Performed by: NURSE PRACTITIONER

## 2021-11-16 PROCEDURE — 3066F NEPHROPATHY DOC TX: CPT | Mod: HCNC,CPTII,S$GLB, | Performed by: NURSE PRACTITIONER

## 2021-11-16 PROCEDURE — 3044F HG A1C LEVEL LT 7.0%: CPT | Mod: HCNC,CPTII,S$GLB, | Performed by: NURSE PRACTITIONER

## 2021-11-16 PROCEDURE — 3044F PR MOST RECENT HEMOGLOBIN A1C LEVEL <7.0%: ICD-10-PCS | Mod: HCNC,CPTII,S$GLB, | Performed by: NURSE PRACTITIONER

## 2021-11-16 PROCEDURE — 1101F PR PT FALLS ASSESS DOC 0-1 FALLS W/OUT INJ PAST YR: ICD-10-PCS | Mod: HCNC,CPTII,S$GLB, | Performed by: NURSE PRACTITIONER

## 2021-11-16 PROCEDURE — 99999 PR PBB SHADOW E&M-EST. PATIENT-LVL V: ICD-10-PCS | Mod: PBBFAC,HCNC,, | Performed by: NURSE PRACTITIONER

## 2021-11-16 PROCEDURE — U0002 COVID-19 LAB TEST NON-CDC: HCPCS | Mod: HCNC | Performed by: INTERNAL MEDICINE

## 2021-11-16 PROCEDURE — 25000003 PHARM REV CODE 250: Mod: HCNC | Performed by: INTERNAL MEDICINE

## 2021-11-16 PROCEDURE — 3080F DIAST BP >= 90 MM HG: CPT | Mod: HCNC,CPTII,S$GLB, | Performed by: NURSE PRACTITIONER

## 2021-11-16 PROCEDURE — 1126F AMNT PAIN NOTED NONE PRSNT: CPT | Mod: HCNC,CPTII,S$GLB, | Performed by: NURSE PRACTITIONER

## 2021-11-16 PROCEDURE — A4216 STERILE WATER/SALINE, 10 ML: HCPCS | Mod: HCNC | Performed by: INTERNAL MEDICINE

## 2021-11-16 PROCEDURE — 3080F PR MOST RECENT DIASTOLIC BLOOD PRESSURE >= 90 MM HG: ICD-10-PCS | Mod: HCNC,CPTII,S$GLB, | Performed by: NURSE PRACTITIONER

## 2021-11-16 PROCEDURE — 96375 TX/PRO/DX INJ NEW DRUG ADDON: CPT | Mod: HCNC

## 2021-11-16 PROCEDURE — 1159F MED LIST DOCD IN RCRD: CPT | Mod: HCNC,CPTII,S$GLB, | Performed by: NURSE PRACTITIONER

## 2021-11-16 PROCEDURE — 99214 PR OFFICE/OUTPT VISIT, EST, LEVL IV, 30-39 MIN: ICD-10-PCS | Mod: 25,HCNC,S$GLB, | Performed by: NURSE PRACTITIONER

## 2021-11-16 PROCEDURE — 4010F PR ACE/ARB THEARPY RXD/TAKEN: ICD-10-PCS | Mod: HCNC,CPTII,S$GLB, | Performed by: NURSE PRACTITIONER

## 2021-11-16 PROCEDURE — 1101F PT FALLS ASSESS-DOCD LE1/YR: CPT | Mod: HCNC,CPTII,S$GLB, | Performed by: NURSE PRACTITIONER

## 2021-11-16 PROCEDURE — 3066F PR DOCUMENTATION OF TREATMENT FOR NEPHROPATHY: ICD-10-PCS | Mod: HCNC,CPTII,S$GLB, | Performed by: NURSE PRACTITIONER

## 2021-11-16 PROCEDURE — 63600175 PHARM REV CODE 636 W HCPCS: Mod: HCNC | Performed by: INTERNAL MEDICINE

## 2021-11-16 PROCEDURE — 3008F PR BODY MASS INDEX (BMI) DOCUMENTED: ICD-10-PCS | Mod: HCNC,CPTII,S$GLB, | Performed by: NURSE PRACTITIONER

## 2021-11-16 PROCEDURE — 1126F PR PAIN SEVERITY QUANTIFIED, NO PAIN PRESENT: ICD-10-PCS | Mod: HCNC,CPTII,S$GLB, | Performed by: NURSE PRACTITIONER

## 2021-11-16 PROCEDURE — 3288F PR FALLS RISK ASSESSMENT DOCUMENTED: ICD-10-PCS | Mod: HCNC,CPTII,S$GLB, | Performed by: NURSE PRACTITIONER

## 2021-11-16 PROCEDURE — 1160F RVW MEDS BY RX/DR IN RCRD: CPT | Mod: HCNC,CPTII,S$GLB, | Performed by: NURSE PRACTITIONER

## 2021-11-16 PROCEDURE — 1160F PR REVIEW ALL MEDS BY PRESCRIBER/CLIN PHARMACIST DOCUMENTED: ICD-10-PCS | Mod: HCNC,CPTII,S$GLB, | Performed by: NURSE PRACTITIONER

## 2021-11-16 PROCEDURE — 1159F PR MEDICATION LIST DOCUMENTED IN MEDICAL RECORD: ICD-10-PCS | Mod: HCNC,CPTII,S$GLB, | Performed by: NURSE PRACTITIONER

## 2021-11-16 PROCEDURE — 4010F ACE/ARB THERAPY RXD/TAKEN: CPT | Mod: HCNC,CPTII,S$GLB, | Performed by: NURSE PRACTITIONER

## 2021-11-16 PROCEDURE — 96413 CHEMO IV INFUSION 1 HR: CPT | Mod: HCNC

## 2021-11-16 PROCEDURE — 96417 CHEMO IV INFUS EACH ADDL SEQ: CPT | Mod: HCNC

## 2021-11-16 PROCEDURE — 3288F FALL RISK ASSESSMENT DOCD: CPT | Mod: HCNC,CPTII,S$GLB, | Performed by: NURSE PRACTITIONER

## 2021-11-16 PROCEDURE — 99214 OFFICE O/P EST MOD 30 MIN: CPT | Mod: 25,HCNC,S$GLB, | Performed by: NURSE PRACTITIONER

## 2021-11-16 RX ORDER — LIDOCAINE AND PRILOCAINE 25; 25 MG/G; MG/G
CREAM TOPICAL
Qty: 30 G | Refills: 2 | Status: SHIPPED | OUTPATIENT
Start: 2021-11-16 | End: 2022-02-08

## 2021-11-16 RX ORDER — SODIUM CHLORIDE 0.9 % (FLUSH) 0.9 %
10 SYRINGE (ML) INJECTION
Status: DISCONTINUED | OUTPATIENT
Start: 2021-11-16 | End: 2021-11-16 | Stop reason: HOSPADM

## 2021-11-16 RX ORDER — HEPARIN 100 UNIT/ML
500 SYRINGE INTRAVENOUS
Status: DISCONTINUED | OUTPATIENT
Start: 2021-11-16 | End: 2021-11-16 | Stop reason: HOSPADM

## 2021-11-16 RX ADMIN — DOCETAXEL 140 MG: 20 INJECTION, SOLUTION, CONCENTRATE INTRAVENOUS at 12:11

## 2021-11-16 RX ADMIN — SODIUM CHLORIDE 10 ML: 9 INJECTION, SOLUTION INTRAMUSCULAR; INTRAVENOUS; SUBCUTANEOUS at 11:11

## 2021-11-16 RX ADMIN — APREPITANT 130 MG: 130 INJECTION, EMULSION INTRAVENOUS at 12:11

## 2021-11-16 RX ADMIN — PALONOSETRON HYDROCHLORIDE: 0.25 INJECTION, SOLUTION INTRAVENOUS at 12:11

## 2021-11-16 RX ADMIN — CYCLOPHOSPHAMIDE 1120 MG: 200 INJECTION, SOLUTION INTRAVENOUS at 02:11

## 2021-11-16 RX ADMIN — HEPARIN 500 UNITS: 100 SYRINGE at 04:11

## 2021-11-16 RX ADMIN — SODIUM CHLORIDE: 0.9 INJECTION, SOLUTION INTRAVENOUS at 12:11

## 2021-11-17 ENCOUNTER — INFUSION (OUTPATIENT)
Dept: INFUSION THERAPY | Facility: HOSPITAL | Age: 66
End: 2021-11-17
Attending: NURSE PRACTITIONER
Payer: MEDICARE

## 2021-11-17 VITALS
HEIGHT: 62 IN | OXYGEN SATURATION: 98 % | DIASTOLIC BLOOD PRESSURE: 89 MMHG | TEMPERATURE: 98 F | BODY MASS INDEX: 31.89 KG/M2 | SYSTOLIC BLOOD PRESSURE: 148 MMHG | HEART RATE: 99 BPM | WEIGHT: 173.31 LBS | RESPIRATION RATE: 18 BRPM

## 2021-11-17 DIAGNOSIS — D05.12 DUCTAL CARCINOMA IN SITU (DCIS) OF LEFT BREAST: Primary | ICD-10-CM

## 2021-11-17 PROCEDURE — 96372 THER/PROPH/DIAG INJ SC/IM: CPT | Mod: HCNC

## 2021-11-17 PROCEDURE — 63600175 PHARM REV CODE 636 W HCPCS: Mod: TB,HCNC | Performed by: INTERNAL MEDICINE

## 2021-11-17 RX ADMIN — PEGFILGRASTIM-CBQV 6 MG: 6 INJECTION, SOLUTION SUBCUTANEOUS at 03:11

## 2021-11-19 ENCOUNTER — DOCUMENTATION ONLY (OUTPATIENT)
Dept: HEMATOLOGY/ONCOLOGY | Facility: CLINIC | Age: 66
End: 2021-11-19
Payer: MEDICARE

## 2021-11-20 ENCOUNTER — TELEPHONE (OUTPATIENT)
Dept: HEMATOLOGY/ONCOLOGY | Facility: HOSPITAL | Age: 66
End: 2021-11-20
Payer: MEDICARE

## 2021-11-20 DIAGNOSIS — B37.0 THRUSH: ICD-10-CM

## 2021-11-20 DIAGNOSIS — R19.7 DIARRHEA, UNSPECIFIED TYPE: Primary | ICD-10-CM

## 2021-11-20 RX ORDER — NYSTATIN 100000 [USP'U]/ML
4 SUSPENSION ORAL 4 TIMES DAILY
Qty: 160 ML | Refills: 0 | Status: SHIPPED | OUTPATIENT
Start: 2021-11-20 | End: 2021-11-30

## 2021-11-20 RX ORDER — DIPHENOXYLATE HYDROCHLORIDE AND ATROPINE SULFATE 2.5; .025 MG/1; MG/1
1 TABLET ORAL 4 TIMES DAILY PRN
Qty: 20 TABLET | Refills: 1 | Status: SHIPPED | OUTPATIENT
Start: 2021-11-20 | End: 2021-12-02

## 2021-11-21 RX ORDER — SODIUM CHLORIDE 0.9 % (FLUSH) 0.9 %
10 SYRINGE (ML) INJECTION
Status: CANCELLED | OUTPATIENT
Start: 2021-11-21

## 2021-11-21 RX ORDER — HEPARIN 100 UNIT/ML
500 SYRINGE INTRAVENOUS
Status: CANCELLED | OUTPATIENT
Start: 2021-11-21

## 2021-11-22 ENCOUNTER — OFFICE VISIT (OUTPATIENT)
Dept: HEMATOLOGY/ONCOLOGY | Facility: CLINIC | Age: 66
End: 2021-11-22
Payer: MEDICARE

## 2021-11-22 ENCOUNTER — TELEPHONE (OUTPATIENT)
Dept: HEMATOLOGY/ONCOLOGY | Facility: CLINIC | Age: 66
End: 2021-11-22
Payer: MEDICARE

## 2021-11-22 ENCOUNTER — INFUSION (OUTPATIENT)
Dept: INFUSION THERAPY | Facility: HOSPITAL | Age: 66
End: 2021-11-22
Attending: NURSE PRACTITIONER
Payer: MEDICARE

## 2021-11-22 ENCOUNTER — HOSPITAL ENCOUNTER (INPATIENT)
Facility: HOSPITAL | Age: 66
LOS: 4 days | Discharge: HOME | DRG: 871 | End: 2021-11-26
Attending: EMERGENCY MEDICINE | Admitting: FAMILY MEDICINE
Payer: MEDICARE

## 2021-11-22 VITALS
HEART RATE: 125 BPM | TEMPERATURE: 98 F | DIASTOLIC BLOOD PRESSURE: 70 MMHG | RESPIRATION RATE: 20 BRPM | SYSTOLIC BLOOD PRESSURE: 118 MMHG | OXYGEN SATURATION: 98 %

## 2021-11-22 VITALS
DIASTOLIC BLOOD PRESSURE: 77 MMHG | TEMPERATURE: 97 F | SYSTOLIC BLOOD PRESSURE: 117 MMHG | WEIGHT: 169 LBS | HEIGHT: 62 IN | BODY MASS INDEX: 31.1 KG/M2 | OXYGEN SATURATION: 98 % | HEART RATE: 134 BPM

## 2021-11-22 DIAGNOSIS — D84.822 IMMUNODEFICIENCY SECONDARY TO RADIATION THERAPY: ICD-10-CM

## 2021-11-22 DIAGNOSIS — D84.821 IMMUNOSUPPRESSION DUE TO DRUG THERAPY: ICD-10-CM

## 2021-11-22 DIAGNOSIS — Z94.1 HEART TRANSPLANTED: Chronic | ICD-10-CM

## 2021-11-22 DIAGNOSIS — E86.1 INTRAVASCULAR VOLUME DEPLETION: ICD-10-CM

## 2021-11-22 DIAGNOSIS — D84.9 IMMUNOCOMPROMISED PATIENT: ICD-10-CM

## 2021-11-22 DIAGNOSIS — R65.10 SIRS (SYSTEMIC INFLAMMATORY RESPONSE SYNDROME): ICD-10-CM

## 2021-11-22 DIAGNOSIS — Z85.3 HISTORY OF BREAST CANCER: ICD-10-CM

## 2021-11-22 DIAGNOSIS — F33.9 CHRONIC MAJOR DEPRESSIVE DISORDER, RECURRENT EPISODE: ICD-10-CM

## 2021-11-22 DIAGNOSIS — D05.12 DUCTAL CARCINOMA IN SITU (DCIS) OF LEFT BREAST: Primary | ICD-10-CM

## 2021-11-22 DIAGNOSIS — D61.818 PANCYTOPENIA: ICD-10-CM
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
## 2021-11-22 DIAGNOSIS — Z79.899 IMMUNOSUPPRESSION DUE TO DRUG THERAPY: ICD-10-CM

## 2021-11-22 DIAGNOSIS — N17.9 ACUTE KIDNEY INJURY: ICD-10-CM

## 2021-11-22 DIAGNOSIS — D70.9 NEUTROPENIC FEVER: Primary | ICD-10-CM

## 2021-11-22 DIAGNOSIS — R11.2 NAUSEA & VOMITING: ICD-10-CM

## 2021-11-22 DIAGNOSIS — Z94.1 HISTORY OF HEART TRANSPLANT: ICD-10-CM

## 2021-11-22 DIAGNOSIS — F10.120: ICD-10-CM

## 2021-11-22 DIAGNOSIS — R07.9 CHEST PAIN: ICD-10-CM

## 2021-11-22 DIAGNOSIS — E86.0 DEHYDRATION: ICD-10-CM

## 2021-11-22 DIAGNOSIS — R50.81 NEUTROPENIC FEVER: Primary | ICD-10-CM

## 2021-11-22 DIAGNOSIS — R00.0 TACHYCARDIA: ICD-10-CM

## 2021-11-22 DIAGNOSIS — N18.4 CKD (CHRONIC KIDNEY DISEASE) STAGE 4, GFR 15-29 ML/MIN: ICD-10-CM

## 2021-11-22 PROBLEM — A41.9 SEVERE SEPSIS: Status: ACTIVE | Noted: 2021-11-22

## 2021-11-22 PROBLEM — E11.9 DIABETES MELLITUS WITHOUT COMPLICATION: Status: ACTIVE | Noted: 2021-11-22

## 2021-11-22 PROBLEM — R65.20 SEVERE SEPSIS: Status: ACTIVE | Noted: 2021-11-22

## 2021-11-22 PROBLEM — E11.9 DIABETES MELLITUS WITHOUT COMPLICATION: Status: RESOLVED | Noted: 2021-11-22 | Resolved: 2021-11-22

## 2021-11-22 LAB
ALBUMIN SERPL BCP-MCNC: 2.3 G/DL (ref 3.5–5.2)
ALP SERPL-CCNC: 66 U/L (ref 55–135)
ALT SERPL W/O P-5'-P-CCNC: 32 U/L (ref 10–44)
ANION GAP SERPL CALC-SCNC: 12 MMOL/L (ref 8–16)
APTT BLDCRRT: 32.3 SEC (ref 21–32)
AST SERPL-CCNC: 46 U/L (ref 10–40)
BASOPHILS # BLD AUTO: 0.01 K/UL (ref 0–0.2)
BASOPHILS NFR BLD: 5.9 % (ref 0–1.9)
BILIRUB SERPL-MCNC: 0.8 MG/DL (ref 0.1–1)
BUN SERPL-MCNC: 63 MG/DL (ref 8–23)
CALCIUM SERPL-MCNC: 7.1 MG/DL (ref 8.7–10.5)
CHLORIDE SERPL-SCNC: 104 MMOL/L (ref 95–110)
CO2 SERPL-SCNC: 18 MMOL/L (ref 23–29)
CREAT SERPL-MCNC: 2.8 MG/DL (ref 0.5–1.4)
CTP QC/QA: YES
DIFFERENTIAL METHOD: ABNORMAL
EOSINOPHIL # BLD AUTO: 0 K/UL (ref 0–0.5)
EOSINOPHIL NFR BLD: 0 % (ref 0–8)
ERYTHROCYTE [DISTWIDTH] IN BLOOD BY AUTOMATED COUNT: 14 % (ref 11.5–14.5)
EST. GFR  (AFRICAN AMERICAN): 19 ML/MIN/1.73 M^2
EST. GFR  (NON AFRICAN AMERICAN): 17 ML/MIN/1.73 M^2
GLUCOSE SERPL-MCNC: 144 MG/DL (ref 70–110)
HCT VFR BLD AUTO: 27.4 % (ref 37–48.5)
HGB BLD-MCNC: 8.9 G/DL (ref 12–16)
IMM GRANULOCYTES # BLD AUTO: 0 K/UL (ref 0–0.04)
IMM GRANULOCYTES NFR BLD AUTO: 0 % (ref 0–0.5)
INR PPP: 1 (ref 0.8–1.2)
LACTATE SERPL-SCNC: 2.9 MMOL/L (ref 0.5–2.2)
LACTATE SERPL-SCNC: 3.6 MMOL/L (ref 0.5–2.2)
LIPASE SERPL-CCNC: 17 U/L (ref 4–60)
LYMPHOCYTES # BLD AUTO: 0.1 K/UL (ref 1–4.8)
LYMPHOCYTES NFR BLD: 41.2 % (ref 18–48)
MAGNESIUM SERPL-MCNC: 1.4 MG/DL (ref 1.6–2.6)
MCH RBC QN AUTO: 28.1 PG (ref 27–31)
MCHC RBC AUTO-ENTMCNC: 32.5 G/DL (ref 32–36)
MCV RBC AUTO: 86 FL (ref 82–98)
MONOCYTES # BLD AUTO: 0.1 K/UL (ref 0.3–1)
MONOCYTES NFR BLD: 35.3 % (ref 4–15)
NEUTROPHILS # BLD AUTO: 0 K/UL (ref 1.8–7.7)
NEUTROPHILS NFR BLD: 17.6 % (ref 38–73)
NRBC BLD-RTO: 0 /100 WBC
PLATELET # BLD AUTO: 101 K/UL (ref 150–450)
PMV BLD AUTO: 10.5 FL (ref 9.2–12.9)
POC MOLECULAR INFLUENZA A AGN: NEGATIVE
POC MOLECULAR INFLUENZA B AGN: NEGATIVE
POTASSIUM SERPL-SCNC: 5.1 MMOL/L (ref 3.5–5.1)
PROCALCITONIN SERPL IA-MCNC: 2.47 NG/ML
PROT SERPL-MCNC: 5.3 G/DL (ref 6–8.4)
PROTHROMBIN TIME: 11 SEC (ref 9–12.5)
RBC # BLD AUTO: 3.17 M/UL (ref 4–5.4)
SODIUM SERPL-SCNC: 134 MMOL/L (ref 136–145)
WBC # BLD AUTO: 0.17 K/UL (ref 3.9–12.7)

## 2021-11-22 PROCEDURE — 93005 ELECTROCARDIOGRAM TRACING: CPT | Mod: HCNC

## 2021-11-22 PROCEDURE — 85610 PROTHROMBIN TIME: CPT | Mod: HCNC | Performed by: EMERGENCY MEDICINE

## 2021-11-22 PROCEDURE — 3066F PR DOCUMENTATION OF TREATMENT FOR NEPHROPATHY: ICD-10-PCS | Mod: HCNC,CPTII,S$GLB, | Performed by: INTERNAL MEDICINE

## 2021-11-22 PROCEDURE — 63600175 PHARM REV CODE 636 W HCPCS: Mod: HCNC | Performed by: STUDENT IN AN ORGANIZED HEALTH CARE EDUCATION/TRAINING PROGRAM

## 2021-11-22 PROCEDURE — A4216 STERILE WATER/SALINE, 10 ML: HCPCS | Mod: HCNC | Performed by: INTERNAL MEDICINE

## 2021-11-22 PROCEDURE — 93010 EKG 12-LEAD: ICD-10-PCS | Mod: HCNC,,, | Performed by: STUDENT IN AN ORGANIZED HEALTH CARE EDUCATION/TRAINING PROGRAM

## 2021-11-22 PROCEDURE — 83690 ASSAY OF LIPASE: CPT | Mod: HCNC | Performed by: EMERGENCY MEDICINE

## 2021-11-22 PROCEDURE — 25000003 PHARM REV CODE 250: Mod: HCNC | Performed by: STUDENT IN AN ORGANIZED HEALTH CARE EDUCATION/TRAINING PROGRAM

## 2021-11-22 PROCEDURE — 99999 PR PBB SHADOW E&M-EST. PATIENT-LVL V: ICD-10-PCS | Mod: PBBFAC,HCNC,, | Performed by: INTERNAL MEDICINE

## 2021-11-22 PROCEDURE — 25000003 PHARM REV CODE 250: Mod: HCNC | Performed by: EMERGENCY MEDICINE

## 2021-11-22 PROCEDURE — 96361 HYDRATE IV INFUSION ADD-ON: CPT | Mod: HCNC

## 2021-11-22 PROCEDURE — 83605 ASSAY OF LACTIC ACID: CPT | Mod: 91,HCNC | Performed by: EMERGENCY MEDICINE

## 2021-11-22 PROCEDURE — 93010 ELECTROCARDIOGRAM REPORT: CPT | Mod: HCNC,,, | Performed by: STUDENT IN AN ORGANIZED HEALTH CARE EDUCATION/TRAINING PROGRAM

## 2021-11-22 PROCEDURE — 4010F ACE/ARB THERAPY RXD/TAKEN: CPT | Mod: HCNC,CPTII,S$GLB, | Performed by: INTERNAL MEDICINE

## 2021-11-22 PROCEDURE — 3066F NEPHROPATHY DOC TX: CPT | Mod: HCNC,CPTII,S$GLB, | Performed by: INTERNAL MEDICINE

## 2021-11-22 PROCEDURE — 84145 PROCALCITONIN (PCT): CPT | Mod: HCNC | Performed by: EMERGENCY MEDICINE

## 2021-11-22 PROCEDURE — 85007 BL SMEAR W/DIFF WBC COUNT: CPT | Mod: HCNC | Performed by: EMERGENCY MEDICINE

## 2021-11-22 PROCEDURE — 99291 CRITICAL CARE FIRST HOUR: CPT | Mod: 25,HCNC

## 2021-11-22 PROCEDURE — 83735 ASSAY OF MAGNESIUM: CPT | Mod: HCNC | Performed by: EMERGENCY MEDICINE

## 2021-11-22 PROCEDURE — 87040 BLOOD CULTURE FOR BACTERIA: CPT | Mod: 59,HCNC | Performed by: EMERGENCY MEDICINE

## 2021-11-22 PROCEDURE — 63600175 PHARM REV CODE 636 W HCPCS: Mod: HCNC | Performed by: INTERNAL MEDICINE

## 2021-11-22 PROCEDURE — 96365 THER/PROPH/DIAG IV INF INIT: CPT | Mod: HCNC

## 2021-11-22 PROCEDURE — 99999 PR PBB SHADOW E&M-EST. PATIENT-LVL V: CPT | Mod: PBBFAC,HCNC,, | Performed by: INTERNAL MEDICINE

## 2021-11-22 PROCEDURE — 4010F PR ACE/ARB THEARPY RXD/TAKEN: ICD-10-PCS | Mod: HCNC,CPTII,S$GLB, | Performed by: INTERNAL MEDICINE

## 2021-11-22 PROCEDURE — 25000003 PHARM REV CODE 250: Mod: HCNC | Performed by: INTERNAL MEDICINE

## 2021-11-22 PROCEDURE — 21400001 HC TELEMETRY ROOM: Mod: HCNC

## 2021-11-22 PROCEDURE — 99215 OFFICE O/P EST HI 40 MIN: CPT | Mod: 25,HCNC,S$GLB, | Performed by: INTERNAL MEDICINE

## 2021-11-22 PROCEDURE — 63600175 PHARM REV CODE 636 W HCPCS: Mod: HCNC | Performed by: EMERGENCY MEDICINE

## 2021-11-22 PROCEDURE — 85730 THROMBOPLASTIN TIME PARTIAL: CPT | Mod: HCNC | Performed by: EMERGENCY MEDICINE

## 2021-11-22 PROCEDURE — 85027 COMPLETE CBC AUTOMATED: CPT | Mod: HCNC | Performed by: EMERGENCY MEDICINE

## 2021-11-22 PROCEDURE — 96374 THER/PROPH/DIAG INJ IV PUSH: CPT | Mod: HCNC

## 2021-11-22 PROCEDURE — 99215 PR OFFICE/OUTPT VISIT, EST, LEVL V, 40-54 MIN: ICD-10-PCS | Mod: 25,HCNC,S$GLB, | Performed by: INTERNAL MEDICINE

## 2021-11-22 PROCEDURE — 80053 COMPREHEN METABOLIC PANEL: CPT | Mod: 91,HCNC | Performed by: EMERGENCY MEDICINE

## 2021-11-22 RX ORDER — SODIUM CHLORIDE 0.9 % (FLUSH) 0.9 %
10 SYRINGE (ML) INJECTION
Status: DISCONTINUED | OUTPATIENT
Start: 2021-11-22 | End: 2021-11-22 | Stop reason: HOSPADM

## 2021-11-22 RX ORDER — CLONIDINE HYDROCHLORIDE 0.1 MG/1
0.1 TABLET ORAL NIGHTLY
Status: DISCONTINUED | OUTPATIENT
Start: 2021-11-22 | End: 2021-11-26 | Stop reason: HOSPADM

## 2021-11-22 RX ORDER — ONDANSETRON 2 MG/ML
4 INJECTION INTRAMUSCULAR; INTRAVENOUS EVERY 8 HOURS PRN
Status: DISCONTINUED | OUTPATIENT
Start: 2021-11-22 | End: 2021-11-26 | Stop reason: HOSPADM

## 2021-11-22 RX ORDER — ACETAMINOPHEN 325 MG/1
650 TABLET ORAL EVERY 6 HOURS PRN
Status: DISCONTINUED | OUTPATIENT
Start: 2021-11-22 | End: 2021-11-23

## 2021-11-22 RX ORDER — ONDANSETRON 2 MG/ML
8 INJECTION INTRAMUSCULAR; INTRAVENOUS ONCE
Status: COMPLETED | OUTPATIENT
Start: 2021-11-22 | End: 2021-11-22

## 2021-11-22 RX ORDER — IBUPROFEN 200 MG
24 TABLET ORAL
Status: DISCONTINUED | OUTPATIENT
Start: 2021-11-22 | End: 2021-11-26 | Stop reason: HOSPADM

## 2021-11-22 RX ORDER — INSULIN ASPART 100 [IU]/ML
1-10 INJECTION, SOLUTION INTRAVENOUS; SUBCUTANEOUS
Status: DISCONTINUED | OUTPATIENT
Start: 2021-11-22 | End: 2021-11-26 | Stop reason: HOSPADM

## 2021-11-22 RX ORDER — SODIUM CHLORIDE 0.9 % (FLUSH) 0.9 %
10 SYRINGE (ML) INJECTION EVERY 12 HOURS PRN
Status: DISCONTINUED | OUTPATIENT
Start: 2021-11-22 | End: 2021-11-26 | Stop reason: HOSPADM

## 2021-11-22 RX ORDER — CYCLOSPORINE 25 MG/1
75 CAPSULE, LIQUID FILLED ORAL 2 TIMES DAILY
Status: DISCONTINUED | OUTPATIENT
Start: 2021-11-22 | End: 2021-11-23

## 2021-11-22 RX ORDER — HYDROCODONE BITARTRATE AND ACETAMINOPHEN 5; 325 MG/1; MG/1
1 TABLET ORAL EVERY 6 HOURS PRN
Status: DISCONTINUED | OUTPATIENT
Start: 2021-11-22 | End: 2021-11-23

## 2021-11-22 RX ORDER — GLUCAGON 1 MG
1 KIT INJECTION
Status: DISCONTINUED | OUTPATIENT
Start: 2021-11-22 | End: 2021-11-26 | Stop reason: HOSPADM

## 2021-11-22 RX ORDER — IBUPROFEN 200 MG
16 TABLET ORAL
Status: DISCONTINUED | OUTPATIENT
Start: 2021-11-22 | End: 2021-11-26 | Stop reason: HOSPADM

## 2021-11-22 RX ORDER — ASPIRIN 81 MG/1
81 TABLET ORAL DAILY
Status: DISCONTINUED | OUTPATIENT
Start: 2021-11-23 | End: 2021-11-26 | Stop reason: HOSPADM

## 2021-11-22 RX ORDER — CEFEPIME HYDROCHLORIDE 1 G/50ML
1 INJECTION, SOLUTION INTRAVENOUS
Status: DISCONTINUED | OUTPATIENT
Start: 2021-11-22 | End: 2021-11-22 | Stop reason: DRUGHIGH

## 2021-11-22 RX ORDER — SODIUM CHLORIDE 9 MG/ML
1000 INJECTION, SOLUTION INTRAVENOUS
Status: COMPLETED | OUTPATIENT
Start: 2021-11-22 | End: 2021-11-22

## 2021-11-22 RX ORDER — HEPARIN 100 UNIT/ML
500 SYRINGE INTRAVENOUS
Status: DISCONTINUED | OUTPATIENT
Start: 2021-11-22 | End: 2021-11-22 | Stop reason: HOSPADM

## 2021-11-22 RX ORDER — DEXAMETHASONE 4 MG/1
8 TABLET ORAL DAILY
Status: DISCONTINUED | OUTPATIENT
Start: 2021-11-23 | End: 2021-11-23

## 2021-11-22 RX ORDER — ACETAMINOPHEN 500 MG
1000 TABLET ORAL
Status: COMPLETED | OUTPATIENT
Start: 2021-11-22 | End: 2021-11-22

## 2021-11-22 RX ORDER — ONDANSETRON 2 MG/ML
8 INJECTION INTRAMUSCULAR; INTRAVENOUS ONCE
Status: CANCELLED
Start: 2021-11-22 | End: 2021-11-22

## 2021-11-22 RX ORDER — SODIUM CHLORIDE, SODIUM LACTATE, POTASSIUM CHLORIDE, CALCIUM CHLORIDE 600; 310; 30; 20 MG/100ML; MG/100ML; MG/100ML; MG/100ML
INJECTION, SOLUTION INTRAVENOUS CONTINUOUS
Status: DISCONTINUED | OUTPATIENT
Start: 2021-11-22 | End: 2021-11-22

## 2021-11-22 RX ORDER — NALOXONE HCL 0.4 MG/ML
0.02 VIAL (ML) INJECTION
Status: DISCONTINUED | OUTPATIENT
Start: 2021-11-22 | End: 2021-11-26 | Stop reason: HOSPADM

## 2021-11-22 RX ORDER — CEFEPIME HYDROCHLORIDE 1 G/50ML
2 INJECTION, SOLUTION INTRAVENOUS
Status: DISCONTINUED | OUTPATIENT
Start: 2021-11-22 | End: 2021-11-25

## 2021-11-22 RX ADMIN — SODIUM CHLORIDE, SODIUM LACTATE, POTASSIUM CHLORIDE, AND CALCIUM CHLORIDE 1000 ML: .6; .31; .03; .02 INJECTION, SOLUTION INTRAVENOUS at 08:11

## 2021-11-22 RX ADMIN — SODIUM CHLORIDE, SODIUM LACTATE, POTASSIUM CHLORIDE, AND CALCIUM CHLORIDE 1000 ML: .6; .31; .03; .02 INJECTION, SOLUTION INTRAVENOUS at 05:11

## 2021-11-22 RX ADMIN — SODIUM CHLORIDE 1000 ML: 0.9 INJECTION, SOLUTION INTRAVENOUS at 11:11

## 2021-11-22 RX ADMIN — ACETAMINOPHEN 650 MG: 325 TABLET ORAL at 08:11

## 2021-11-22 RX ADMIN — SODIUM CHLORIDE 10 ML: 9 INJECTION, SOLUTION INTRAMUSCULAR; INTRAVENOUS; SUBCUTANEOUS at 10:11

## 2021-11-22 RX ADMIN — CYCLOSPORINE 75 MG: 25 CAPSULE, LIQUID FILLED ORAL at 11:11

## 2021-11-22 RX ADMIN — ONDANSETRON HYDROCHLORIDE 8 MG: 2 SOLUTION INTRAMUSCULAR; INTRAVENOUS at 11:11

## 2021-11-22 RX ADMIN — SODIUM CHLORIDE, SODIUM LACTATE, POTASSIUM CHLORIDE, AND CALCIUM CHLORIDE: .6; .31; .03; .02 INJECTION, SOLUTION INTRAVENOUS at 07:11

## 2021-11-22 RX ADMIN — CLONIDINE HYDROCHLORIDE 0.1 MG: 0.1 TABLET ORAL at 08:11

## 2021-11-22 RX ADMIN — SODIUM CHLORIDE, SODIUM LACTATE, POTASSIUM CHLORIDE, AND CALCIUM CHLORIDE 1000 ML: .6; .31; .03; .02 INJECTION, SOLUTION INTRAVENOUS at 01:11

## 2021-11-22 RX ADMIN — CEFEPIME 2 G: 2 INJECTION, POWDER, FOR SOLUTION INTRAVENOUS at 03:11

## 2021-11-22 RX ADMIN — ACETAMINOPHEN 1000 MG: 500 TABLET ORAL at 01:11

## 2021-11-22 RX ADMIN — PROMETHAZINE HYDROCHLORIDE 25 MG: 25 INJECTION INTRAMUSCULAR; INTRAVENOUS at 02:11

## 2021-11-23 LAB
ALBUMIN SERPL BCP-MCNC: 1.9 G/DL (ref 3.5–5.2)
ALP SERPL-CCNC: 59 U/L (ref 55–135)
ALT SERPL W/O P-5'-P-CCNC: 31 U/L (ref 10–44)
AMORPH CRY URNS QL MICRO: NORMAL
ANION GAP SERPL CALC-SCNC: 11 MMOL/L (ref 8–16)
AST SERPL-CCNC: 50 U/L (ref 10–40)
BACTERIA #/AREA URNS HPF: NORMAL /HPF
BASOPHILS # BLD AUTO: 0 K/UL (ref 0–0.2)
BASOPHILS NFR BLD: 0 % (ref 0–1.9)
BILIRUB SERPL-MCNC: 1.5 MG/DL (ref 0.1–1)
BILIRUB UR QL STRIP: NEGATIVE
BUN SERPL-MCNC: 49 MG/DL (ref 8–23)
CALCIUM SERPL-MCNC: 7.1 MG/DL (ref 8.7–10.5)
CHLORIDE SERPL-SCNC: 110 MMOL/L (ref 95–110)
CLARITY UR: CLEAR
CO2 SERPL-SCNC: 19 MMOL/L (ref 23–29)
COLOR UR: YELLOW
CREAT SERPL-MCNC: 2.5 MG/DL (ref 0.5–1.4)
DIFFERENTIAL METHOD: ABNORMAL
EOSINOPHIL # BLD AUTO: 0 K/UL (ref 0–0.5)
EOSINOPHIL NFR BLD: 0 % (ref 0–8)
ERYTHROCYTE [DISTWIDTH] IN BLOOD BY AUTOMATED COUNT: 13.7 % (ref 11.5–14.5)
EST. GFR  (AFRICAN AMERICAN): 22 ML/MIN/1.73 M^2
EST. GFR  (NON AFRICAN AMERICAN): 19 ML/MIN/1.73 M^2
GLUCOSE SERPL-MCNC: 80 MG/DL (ref 70–110)
GLUCOSE UR QL STRIP: NEGATIVE
HCT VFR BLD AUTO: 34.8 % (ref 37–48.5)
HGB BLD-MCNC: 11.7 G/DL (ref 12–16)
HGB UR QL STRIP: ABNORMAL
HYALINE CASTS #/AREA URNS LPF: 0 /LPF
IMM GRANULOCYTES # BLD AUTO: 0 K/UL (ref 0–0.04)
IMM GRANULOCYTES NFR BLD AUTO: 0 % (ref 0–0.5)
KETONES UR QL STRIP: NEGATIVE
LACTATE SERPL-SCNC: 0.7 MMOL/L (ref 0.5–2.2)
LEUKOCYTE ESTERASE UR QL STRIP: NEGATIVE
LYMPHOCYTES # BLD AUTO: 0.1 K/UL (ref 1–4.8)
LYMPHOCYTES NFR BLD: 41.7 % (ref 18–48)
MAGNESIUM SERPL-MCNC: 1.5 MG/DL (ref 1.6–2.6)
MCH RBC QN AUTO: 28.4 PG (ref 27–31)
MCHC RBC AUTO-ENTMCNC: 33.6 G/DL (ref 32–36)
MCV RBC AUTO: 85 FL (ref 82–98)
MICROSCOPIC COMMENT: NORMAL
MONOCYTES # BLD AUTO: 0.1 K/UL (ref 0.3–1)
MONOCYTES NFR BLD: 41.7 % (ref 4–15)
NEUTROPHILS # BLD AUTO: 0 K/UL (ref 1.8–7.7)
NEUTROPHILS NFR BLD: 16.6 % (ref 38–73)
NITRITE UR QL STRIP: NEGATIVE
NRBC BLD-RTO: 0 /100 WBC
PH UR STRIP: 6 [PH] (ref 5–8)
PLATELET # BLD AUTO: 52 K/UL (ref 150–450)
PLATELET BLD QL SMEAR: ABNORMAL
PMV BLD AUTO: 10.8 FL (ref 9.2–12.9)
POCT GLUCOSE: 132 MG/DL (ref 70–110)
POCT GLUCOSE: 168 MG/DL (ref 70–110)
POCT GLUCOSE: 183 MG/DL (ref 70–110)
POCT GLUCOSE: 87 MG/DL (ref 70–110)
POTASSIUM SERPL-SCNC: 4.9 MMOL/L (ref 3.5–5.1)
PROT SERPL-MCNC: 5.1 G/DL (ref 6–8.4)
PROT UR QL STRIP: ABNORMAL
RBC # BLD AUTO: 4.12 M/UL (ref 4–5.4)
RBC #/AREA URNS HPF: 0 /HPF (ref 0–4)
SODIUM SERPL-SCNC: 140 MMOL/L (ref 136–145)
SP GR UR STRIP: 1.01 (ref 1–1.03)
URN SPEC COLLECT METH UR: ABNORMAL
UROBILINOGEN UR STRIP-ACNC: NEGATIVE EU/DL
WBC # BLD AUTO: 0.12 K/UL (ref 3.9–12.7)
WBC #/AREA URNS HPF: 0 /HPF (ref 0–5)

## 2021-11-23 PROCEDURE — 81000 URINALYSIS NONAUTO W/SCOPE: CPT | Mod: HCNC | Performed by: EMERGENCY MEDICINE

## 2021-11-23 PROCEDURE — 63600175 PHARM REV CODE 636 W HCPCS: Mod: HCNC | Performed by: EMERGENCY MEDICINE

## 2021-11-23 PROCEDURE — 21400001 HC TELEMETRY ROOM: Mod: HCNC

## 2021-11-23 PROCEDURE — 99223 1ST HOSP IP/OBS HIGH 75: CPT | Mod: HCNC,,, | Performed by: INTERNAL MEDICINE

## 2021-11-23 PROCEDURE — 63700000 PHARM REV CODE 250 ALT 637 W/O HCPCS: Mod: HCNC | Performed by: FAMILY MEDICINE

## 2021-11-23 PROCEDURE — 25000003 PHARM REV CODE 250: Mod: HCNC | Performed by: STUDENT IN AN ORGANIZED HEALTH CARE EDUCATION/TRAINING PROGRAM

## 2021-11-23 PROCEDURE — 63600175 PHARM REV CODE 636 W HCPCS: Mod: HCNC | Performed by: FAMILY MEDICINE

## 2021-11-23 PROCEDURE — 25000003 PHARM REV CODE 250: Mod: HCNC | Performed by: NURSE PRACTITIONER

## 2021-11-23 PROCEDURE — 83735 ASSAY OF MAGNESIUM: CPT | Mod: HCNC | Performed by: STUDENT IN AN ORGANIZED HEALTH CARE EDUCATION/TRAINING PROGRAM

## 2021-11-23 PROCEDURE — 80053 COMPREHEN METABOLIC PANEL: CPT | Mod: HCNC | Performed by: STUDENT IN AN ORGANIZED HEALTH CARE EDUCATION/TRAINING PROGRAM

## 2021-11-23 PROCEDURE — 25000003 PHARM REV CODE 250: Mod: HCNC | Performed by: FAMILY MEDICINE

## 2021-11-23 PROCEDURE — 85025 COMPLETE CBC W/AUTO DIFF WBC: CPT | Mod: HCNC | Performed by: STUDENT IN AN ORGANIZED HEALTH CARE EDUCATION/TRAINING PROGRAM

## 2021-11-23 PROCEDURE — 99223 PR INITIAL HOSPITAL CARE,LEVL III: ICD-10-PCS | Mod: HCNC,,, | Performed by: INTERNAL MEDICINE

## 2021-11-23 PROCEDURE — 27000207 HC ISOLATION: Mod: HCNC

## 2021-11-23 PROCEDURE — 83605 ASSAY OF LACTIC ACID: CPT | Mod: HCNC | Performed by: STUDENT IN AN ORGANIZED HEALTH CARE EDUCATION/TRAINING PROGRAM

## 2021-11-23 RX ORDER — TALC
6 POWDER (GRAM) TOPICAL NIGHTLY PRN
Status: DISCONTINUED | OUTPATIENT
Start: 2021-11-24 | End: 2021-11-26 | Stop reason: HOSPADM

## 2021-11-23 RX ORDER — FLUCONAZOLE 100 MG/1
100 TABLET ORAL DAILY
Status: DISCONTINUED | OUTPATIENT
Start: 2021-11-23 | End: 2021-11-26 | Stop reason: HOSPADM

## 2021-11-23 RX ORDER — FLUCONAZOLE 100 MG/1
100 TABLET ORAL DAILY
Status: DISCONTINUED | OUTPATIENT
Start: 2021-11-24 | End: 2021-11-23

## 2021-11-23 RX ORDER — ACETAMINOPHEN 325 MG/1
650 TABLET ORAL EVERY 6 HOURS PRN
Status: DISCONTINUED | OUTPATIENT
Start: 2021-11-23 | End: 2021-11-26 | Stop reason: HOSPADM

## 2021-11-23 RX ORDER — SODIUM CHLORIDE 9 MG/ML
INJECTION, SOLUTION INTRAVENOUS CONTINUOUS
Status: ACTIVE | OUTPATIENT
Start: 2021-11-23 | End: 2021-11-24

## 2021-11-23 RX ORDER — FLUCONAZOLE 100 MG/1
200 TABLET ORAL ONCE
Status: DISCONTINUED | OUTPATIENT
Start: 2021-11-23 | End: 2021-11-23

## 2021-11-23 RX ORDER — OXYCODONE AND ACETAMINOPHEN 7.5; 325 MG/1; MG/1
1 TABLET ORAL EVERY 4 HOURS PRN
Status: DISCONTINUED | OUTPATIENT
Start: 2021-11-23 | End: 2021-11-26 | Stop reason: HOSPADM

## 2021-11-23 RX ADMIN — SODIUM CHLORIDE: 0.9 INJECTION, SOLUTION INTRAVENOUS at 09:11

## 2021-11-23 RX ADMIN — CEFEPIME 2 G: 2 INJECTION, POWDER, FOR SOLUTION INTRAVENOUS at 03:11

## 2021-11-23 RX ADMIN — ACETAMINOPHEN 650 MG: 325 TABLET ORAL at 09:11

## 2021-11-23 RX ADMIN — ASPIRIN 81 MG: 81 TABLET, COATED ORAL at 09:11

## 2021-11-23 RX ADMIN — SODIUM CHLORIDE: 0.9 INJECTION, SOLUTION INTRAVENOUS at 03:11

## 2021-11-23 RX ADMIN — SODIUM CHLORIDE 500 ML: 0.9 INJECTION, SOLUTION INTRAVENOUS at 03:11

## 2021-11-23 RX ADMIN — OXYCODONE AND ACETAMINOPHEN 1 TABLET: 7.5; 325 TABLET ORAL at 03:11

## 2021-11-23 RX ADMIN — VANCOMYCIN HYDROCHLORIDE 1500 MG: 1.5 INJECTION, POWDER, LYOPHILIZED, FOR SOLUTION INTRAVENOUS at 09:11

## 2021-11-23 RX ADMIN — CLONIDINE HYDROCHLORIDE 0.1 MG: 0.1 TABLET ORAL at 09:11

## 2021-11-23 RX ADMIN — FLUCONAZOLE 100 MG: 100 TABLET ORAL at 01:11

## 2021-11-23 RX ADMIN — HYDROCODONE BITARTRATE AND ACETAMINOPHEN 1 TABLET: 5; 325 TABLET ORAL at 01:11

## 2021-11-23 RX ADMIN — OXYCODONE AND ACETAMINOPHEN 1 TABLET: 7.5; 325 TABLET ORAL at 09:11

## 2021-11-24 ENCOUNTER — DOCUMENTATION ONLY (OUTPATIENT)
Dept: HEMATOLOGY/ONCOLOGY | Facility: CLINIC | Age: 66
End: 2021-11-24
Payer: MEDICARE

## 2021-11-24 ENCOUNTER — TELEPHONE (OUTPATIENT)
Dept: TRANSPLANT | Facility: CLINIC | Age: 66
End: 2021-11-24
Payer: MEDICARE

## 2021-11-24 PROBLEM — D61.818 PANCYTOPENIA: Status: ACTIVE | Noted: 2021-11-24

## 2021-11-24 PROBLEM — R19.7 DIARRHEA: Status: ACTIVE | Noted: 2021-11-24

## 2021-11-24 LAB
ABO + RH BLD: NORMAL
ANION GAP SERPL CALC-SCNC: 13 MMOL/L (ref 8–16)
BASOPHILS # BLD AUTO: 0.01 K/UL (ref 0–0.2)
BASOPHILS NFR BLD: 1.4 % (ref 0–1.9)
BLD GP AB SCN CELLS X3 SERPL QL: NORMAL
BLD PROD TYP BPU: NORMAL
BLOOD UNIT EXPIRATION DATE: NORMAL
BLOOD UNIT TYPE CODE: 5100
BLOOD UNIT TYPE: NORMAL
BUN SERPL-MCNC: 29 MG/DL (ref 8–23)
C DIFF GDH STL QL: POSITIVE
C DIFF TOX A+B STL QL IA: NEGATIVE
C DIFF TOX GENS STL QL NAA+PROBE: POSITIVE
CALCIUM SERPL-MCNC: 6.6 MG/DL (ref 8.7–10.5)
CHLORIDE SERPL-SCNC: 112 MMOL/L (ref 95–110)
CO2 SERPL-SCNC: 16 MMOL/L (ref 23–29)
CODING SYSTEM: NORMAL
CREAT SERPL-MCNC: 2.1 MG/DL (ref 0.5–1.4)
DIFFERENTIAL METHOD: ABNORMAL
DISPENSE STATUS: NORMAL
EOSINOPHIL # BLD AUTO: 0 K/UL (ref 0–0.5)
EOSINOPHIL NFR BLD: 1.4 % (ref 0–8)
ERYTHROCYTE [DISTWIDTH] IN BLOOD BY AUTOMATED COUNT: 14.1 % (ref 11.5–14.5)
EST. GFR  (AFRICAN AMERICAN): 27 ML/MIN/1.73 M^2
EST. GFR  (NON AFRICAN AMERICAN): 24 ML/MIN/1.73 M^2
GLUCOSE SERPL-MCNC: 113 MG/DL (ref 70–110)
HCT VFR BLD AUTO: 21.4 % (ref 37–48.5)
HGB BLD-MCNC: 7.1 G/DL (ref 12–16)
IMM GRANULOCYTES # BLD AUTO: 0.02 K/UL (ref 0–0.04)
IMM GRANULOCYTES NFR BLD AUTO: 2.9 % (ref 0–0.5)
LYMPHOCYTES # BLD AUTO: 0.1 K/UL (ref 1–4.8)
LYMPHOCYTES NFR BLD: 18.6 % (ref 18–48)
MCH RBC QN AUTO: 28.5 PG (ref 27–31)
MCHC RBC AUTO-ENTMCNC: 33.2 G/DL (ref 32–36)
MCV RBC AUTO: 86 FL (ref 82–98)
MONOCYTES # BLD AUTO: 0.2 K/UL (ref 0.3–1)
MONOCYTES NFR BLD: 21.4 % (ref 4–15)
NEUTROPHILS # BLD AUTO: 0.4 K/UL (ref 1.8–7.7)
NEUTROPHILS NFR BLD: 54.3 % (ref 38–73)
NRBC BLD-RTO: 3 /100 WBC
NUM UNITS TRANS PACKED RBC: NORMAL
PLATELET # BLD AUTO: 100 K/UL (ref 150–450)
PLATELET BLD QL SMEAR: ABNORMAL
PMV BLD AUTO: 11.2 FL (ref 9.2–12.9)
POCT GLUCOSE: 126 MG/DL (ref 70–110)
POTASSIUM SERPL-SCNC: 3.9 MMOL/L (ref 3.5–5.1)
RBC # BLD AUTO: 2.49 M/UL (ref 4–5.4)
SODIUM SERPL-SCNC: 141 MMOL/L (ref 136–145)
VANCOMYCIN SERPL-MCNC: 16.5 UG/ML
WBC # BLD AUTO: 0.7 K/UL (ref 3.9–12.7)

## 2021-11-24 PROCEDURE — 87449 NOS EACH ORGANISM AG IA: CPT | Mod: HCNC | Performed by: FAMILY MEDICINE

## 2021-11-24 PROCEDURE — 99233 SBSQ HOSP IP/OBS HIGH 50: CPT | Mod: HCNC,,, | Performed by: INTERNAL MEDICINE

## 2021-11-24 PROCEDURE — 99900035 HC TECH TIME PER 15 MIN (STAT): Mod: HCNC

## 2021-11-24 PROCEDURE — 25000003 PHARM REV CODE 250: Mod: HCNC | Performed by: FAMILY MEDICINE

## 2021-11-24 PROCEDURE — 85025 COMPLETE CBC W/AUTO DIFF WBC: CPT | Mod: HCNC | Performed by: FAMILY MEDICINE

## 2021-11-24 PROCEDURE — 21400001 HC TELEMETRY ROOM: Mod: HCNC

## 2021-11-24 PROCEDURE — 86900 BLOOD TYPING SEROLOGIC ABO: CPT | Mod: HCNC | Performed by: FAMILY MEDICINE

## 2021-11-24 PROCEDURE — 63700000 PHARM REV CODE 250 ALT 637 W/O HCPCS: Mod: HCNC | Performed by: FAMILY MEDICINE

## 2021-11-24 PROCEDURE — 80048 BASIC METABOLIC PNL TOTAL CA: CPT | Mod: HCNC | Performed by: FAMILY MEDICINE

## 2021-11-24 PROCEDURE — 25000003 PHARM REV CODE 250: Mod: HCNC | Performed by: STUDENT IN AN ORGANIZED HEALTH CARE EDUCATION/TRAINING PROGRAM

## 2021-11-24 PROCEDURE — 87324 CLOSTRIDIUM AG IA: CPT | Mod: HCNC | Performed by: FAMILY MEDICINE

## 2021-11-24 PROCEDURE — 63600175 PHARM REV CODE 636 W HCPCS: Mod: HCNC | Performed by: EMERGENCY MEDICINE

## 2021-11-24 PROCEDURE — 27000207 HC ISOLATION: Mod: HCNC

## 2021-11-24 PROCEDURE — 36430 TRANSFUSION BLD/BLD COMPNT: CPT | Mod: HCNC

## 2021-11-24 PROCEDURE — P9040 RBC LEUKOREDUCED IRRADIATED: HCPCS | Mod: HCNC | Performed by: FAMILY MEDICINE

## 2021-11-24 PROCEDURE — 99233 PR SUBSEQUENT HOSPITAL CARE,LEVL III: ICD-10-PCS | Mod: HCNC,,, | Performed by: INTERNAL MEDICINE

## 2021-11-24 PROCEDURE — 27000221 HC OXYGEN, UP TO 24 HOURS: Mod: HCNC

## 2021-11-24 PROCEDURE — 80202 ASSAY OF VANCOMYCIN: CPT | Mod: HCNC | Performed by: FAMILY MEDICINE

## 2021-11-24 PROCEDURE — 63600175 PHARM REV CODE 636 W HCPCS: Mod: HCNC | Performed by: FAMILY MEDICINE

## 2021-11-24 PROCEDURE — 87493 C DIFF AMPLIFIED PROBE: CPT | Mod: HCNC | Performed by: FAMILY MEDICINE

## 2021-11-24 PROCEDURE — 86920 COMPATIBILITY TEST SPIN: CPT | Mod: HCNC | Performed by: FAMILY MEDICINE

## 2021-11-24 RX ORDER — HYDROCODONE BITARTRATE AND ACETAMINOPHEN 500; 5 MG/1; MG/1
TABLET ORAL
Status: DISCONTINUED | OUTPATIENT
Start: 2021-11-24 | End: 2021-11-26 | Stop reason: HOSPADM

## 2021-11-24 RX ORDER — SODIUM CHLORIDE 9 MG/ML
INJECTION, SOLUTION INTRAVENOUS ONCE
Status: COMPLETED | OUTPATIENT
Start: 2021-11-24 | End: 2021-11-24

## 2021-11-24 RX ADMIN — LIDOCAINE HYDROCHLORIDE 15 ML: 20 SOLUTION TOPICAL at 05:11

## 2021-11-24 RX ADMIN — CEFEPIME 2 G: 2 INJECTION, POWDER, FOR SOLUTION INTRAVENOUS at 08:11

## 2021-11-24 RX ADMIN — FLUCONAZOLE 100 MG: 100 TABLET ORAL at 08:11

## 2021-11-24 RX ADMIN — ASPIRIN 81 MG: 81 TABLET, COATED ORAL at 08:11

## 2021-11-24 RX ADMIN — OXYCODONE AND ACETAMINOPHEN 1 TABLET: 7.5; 325 TABLET ORAL at 02:11

## 2021-11-24 RX ADMIN — OXYCODONE AND ACETAMINOPHEN 1 TABLET: 7.5; 325 TABLET ORAL at 08:11

## 2021-11-24 RX ADMIN — OXYCODONE AND ACETAMINOPHEN 1 TABLET: 7.5; 325 TABLET ORAL at 10:11

## 2021-11-24 RX ADMIN — CLONIDINE HYDROCHLORIDE 0.1 MG: 0.1 TABLET ORAL at 08:11

## 2021-11-24 RX ADMIN — LIDOCAINE HYDROCHLORIDE 15 ML: 20 SOLUTION TOPICAL at 02:11

## 2021-11-24 RX ADMIN — OXYCODONE AND ACETAMINOPHEN 1 TABLET: 7.5; 325 TABLET ORAL at 04:11

## 2021-11-24 RX ADMIN — SODIUM CHLORIDE: 0.9 INJECTION, SOLUTION INTRAVENOUS at 02:11

## 2021-11-24 RX ADMIN — VANCOMYCIN HYDROCHLORIDE 125 MG: KIT at 02:11

## 2021-11-24 RX ADMIN — VANCOMYCIN HYDROCHLORIDE 1250 MG: 1.25 INJECTION, POWDER, LYOPHILIZED, FOR SOLUTION INTRAVENOUS at 08:11

## 2021-11-25 PROBLEM — D61.811 DRUG-INDUCED PANCYTOPENIA: Status: ACTIVE | Noted: 2021-11-24

## 2021-11-25 LAB
ALBUMIN SERPL BCP-MCNC: 1.9 G/DL (ref 3.5–5.2)
ALP SERPL-CCNC: 97 U/L (ref 55–135)
ALT SERPL W/O P-5'-P-CCNC: 32 U/L (ref 10–44)
ANION GAP SERPL CALC-SCNC: 13 MMOL/L (ref 8–16)
AST SERPL-CCNC: 38 U/L (ref 10–40)
BASOPHILS # BLD AUTO: 0.02 K/UL (ref 0–0.2)
BASOPHILS NFR BLD: 0.6 % (ref 0–1.9)
BILIRUB SERPL-MCNC: 0.7 MG/DL (ref 0.1–1)
BUN SERPL-MCNC: 24 MG/DL (ref 8–23)
CALCIUM SERPL-MCNC: 7.3 MG/DL (ref 8.7–10.5)
CHLORIDE SERPL-SCNC: 111 MMOL/L (ref 95–110)
CO2 SERPL-SCNC: 16 MMOL/L (ref 23–29)
CREAT SERPL-MCNC: 2.3 MG/DL (ref 0.5–1.4)
DIFFERENTIAL METHOD: ABNORMAL
EOSINOPHIL # BLD AUTO: 0 K/UL (ref 0–0.5)
EOSINOPHIL NFR BLD: 0.9 % (ref 0–8)
ERYTHROCYTE [DISTWIDTH] IN BLOOD BY AUTOMATED COUNT: 13.8 % (ref 11.5–14.5)
EST. GFR  (AFRICAN AMERICAN): 25 ML/MIN/1.73 M^2
EST. GFR  (NON AFRICAN AMERICAN): 21 ML/MIN/1.73 M^2
GLUCOSE SERPL-MCNC: 84 MG/DL (ref 70–110)
HCT VFR BLD AUTO: 23.9 % (ref 37–48.5)
HGB BLD-MCNC: 8 G/DL (ref 12–16)
IMM GRANULOCYTES # BLD AUTO: 0.06 K/UL (ref 0–0.04)
IMM GRANULOCYTES NFR BLD AUTO: 1.9 % (ref 0–0.5)
LYMPHOCYTES # BLD AUTO: 0.2 K/UL (ref 1–4.8)
LYMPHOCYTES NFR BLD: 7 % (ref 18–48)
MCH RBC QN AUTO: 29 PG (ref 27–31)
MCHC RBC AUTO-ENTMCNC: 33.5 G/DL (ref 32–36)
MCV RBC AUTO: 87 FL (ref 82–98)
MONOCYTES # BLD AUTO: 0.3 K/UL (ref 0.3–1)
MONOCYTES NFR BLD: 9.5 % (ref 4–15)
NEUTROPHILS # BLD AUTO: 2.5 K/UL (ref 1.8–7.7)
NEUTROPHILS NFR BLD: 80.1 % (ref 38–73)
NRBC BLD-RTO: 2 /100 WBC
PLATELET # BLD AUTO: 90 K/UL (ref 150–450)
PMV BLD AUTO: 10.8 FL (ref 9.2–12.9)
POCT GLUCOSE: 111 MG/DL (ref 70–110)
POCT GLUCOSE: 121 MG/DL (ref 70–110)
POCT GLUCOSE: 78 MG/DL (ref 70–110)
POCT GLUCOSE: 87 MG/DL (ref 70–110)
POTASSIUM SERPL-SCNC: 3.7 MMOL/L (ref 3.5–5.1)
PROT SERPL-MCNC: 5.5 G/DL (ref 6–8.4)
RBC # BLD AUTO: 2.76 M/UL (ref 4–5.4)
SODIUM SERPL-SCNC: 140 MMOL/L (ref 136–145)
VANCOMYCIN SERPL-MCNC: 27.2 UG/ML
WBC # BLD AUTO: 3.16 K/UL (ref 3.9–12.7)

## 2021-11-25 PROCEDURE — 99233 PR SUBSEQUENT HOSPITAL CARE,LEVL III: ICD-10-PCS | Mod: HCNC,,, | Performed by: INTERNAL MEDICINE

## 2021-11-25 PROCEDURE — 63700000 PHARM REV CODE 250 ALT 637 W/O HCPCS: Mod: HCNC | Performed by: FAMILY MEDICINE

## 2021-11-25 PROCEDURE — 63600175 PHARM REV CODE 636 W HCPCS: Mod: HCNC | Performed by: FAMILY MEDICINE

## 2021-11-25 PROCEDURE — 27000207 HC ISOLATION: Mod: HCNC

## 2021-11-25 PROCEDURE — 21400001 HC TELEMETRY ROOM: Mod: HCNC

## 2021-11-25 PROCEDURE — 80202 ASSAY OF VANCOMYCIN: CPT | Mod: HCNC | Performed by: FAMILY MEDICINE

## 2021-11-25 PROCEDURE — 80053 COMPREHEN METABOLIC PANEL: CPT | Mod: HCNC | Performed by: NURSE PRACTITIONER

## 2021-11-25 PROCEDURE — 25000003 PHARM REV CODE 250: Mod: HCNC | Performed by: STUDENT IN AN ORGANIZED HEALTH CARE EDUCATION/TRAINING PROGRAM

## 2021-11-25 PROCEDURE — 99233 SBSQ HOSP IP/OBS HIGH 50: CPT | Mod: HCNC,,, | Performed by: INTERNAL MEDICINE

## 2021-11-25 PROCEDURE — 94761 N-INVAS EAR/PLS OXIMETRY MLT: CPT | Mod: HCNC

## 2021-11-25 PROCEDURE — 85025 COMPLETE CBC W/AUTO DIFF WBC: CPT | Mod: HCNC | Performed by: NURSE PRACTITIONER

## 2021-11-25 PROCEDURE — 27000221 HC OXYGEN, UP TO 24 HOURS: Mod: HCNC

## 2021-11-25 PROCEDURE — 25000003 PHARM REV CODE 250: Mod: HCNC | Performed by: FAMILY MEDICINE

## 2021-11-25 RX ORDER — CYCLOSPORINE 25 MG/1
75 CAPSULE, LIQUID FILLED ORAL 2 TIMES DAILY
Status: DISCONTINUED | OUTPATIENT
Start: 2021-11-25 | End: 2021-11-26 | Stop reason: HOSPADM

## 2021-11-25 RX ORDER — ZOLPIDEM TARTRATE 5 MG/1
10 TABLET ORAL NIGHTLY
Status: DISCONTINUED | OUTPATIENT
Start: 2021-11-25 | End: 2021-11-26 | Stop reason: HOSPADM

## 2021-11-25 RX ORDER — AMLODIPINE BESYLATE 2.5 MG/1
2.5 TABLET ORAL DAILY
Status: DISCONTINUED | OUTPATIENT
Start: 2021-11-25 | End: 2021-11-26 | Stop reason: HOSPADM

## 2021-11-25 RX ORDER — HYDRALAZINE HYDROCHLORIDE 50 MG/1
100 TABLET, FILM COATED ORAL EVERY 8 HOURS
Status: DISCONTINUED | OUTPATIENT
Start: 2021-11-25 | End: 2021-11-26 | Stop reason: HOSPADM

## 2021-11-25 RX ORDER — BACLOFEN 10 MG/1
10 TABLET ORAL EVERY MORNING
Status: DISCONTINUED | OUTPATIENT
Start: 2021-11-26 | End: 2021-11-25

## 2021-11-25 RX ADMIN — VANCOMYCIN HYDROCHLORIDE 125 MG: KIT at 12:11

## 2021-11-25 RX ADMIN — CYCLOSPORINE 75 MG: 25 CAPSULE, LIQUID FILLED ORAL at 09:11

## 2021-11-25 RX ADMIN — ASPIRIN 81 MG: 81 TABLET, COATED ORAL at 10:11

## 2021-11-25 RX ADMIN — CLONIDINE HYDROCHLORIDE 0.1 MG: 0.1 TABLET ORAL at 09:11

## 2021-11-25 RX ADMIN — CYCLOSPORINE 75 MG: 25 CAPSULE, LIQUID FILLED ORAL at 12:11

## 2021-11-25 RX ADMIN — AMLODIPINE BESYLATE 2.5 MG: 2.5 TABLET ORAL at 04:11

## 2021-11-25 RX ADMIN — VANCOMYCIN HYDROCHLORIDE 125 MG: KIT at 06:11

## 2021-11-25 RX ADMIN — ACETAMINOPHEN 650 MG: 325 TABLET ORAL at 12:11

## 2021-11-25 RX ADMIN — FLUCONAZOLE 100 MG: 100 TABLET ORAL at 10:11

## 2021-11-25 RX ADMIN — VANCOMYCIN HYDROCHLORIDE 125 MG: KIT at 05:11

## 2021-11-25 RX ADMIN — SODIUM BICARBONATE: 84 INJECTION, SOLUTION INTRAVENOUS at 10:11

## 2021-11-25 RX ADMIN — ZOLPIDEM TARTRATE 10 MG: 5 TABLET, COATED ORAL at 11:11

## 2021-11-25 RX ADMIN — HYDRALAZINE HYDROCHLORIDE 100 MG: 50 TABLET ORAL at 09:11

## 2021-11-26 ENCOUNTER — TELEPHONE (OUTPATIENT)
Dept: PHARMACY | Facility: CLINIC | Age: 66
End: 2021-11-26
Payer: MEDICARE

## 2021-11-26 VITALS
DIASTOLIC BLOOD PRESSURE: 74 MMHG | WEIGHT: 179.88 LBS | HEIGHT: 62 IN | SYSTOLIC BLOOD PRESSURE: 122 MMHG | RESPIRATION RATE: 18 BRPM | BODY MASS INDEX: 33.1 KG/M2 | HEART RATE: 109 BPM | OXYGEN SATURATION: 98 % | TEMPERATURE: 98 F

## 2021-11-26 LAB
ALBUMIN SERPL BCP-MCNC: 2 G/DL (ref 3.5–5.2)
ALP SERPL-CCNC: 153 U/L (ref 55–135)
ALT SERPL W/O P-5'-P-CCNC: 39 U/L (ref 10–44)
ANION GAP SERPL CALC-SCNC: 14 MMOL/L (ref 8–16)
AST SERPL-CCNC: 56 U/L (ref 10–40)
BASOPHILS NFR BLD: 0 % (ref 0–1.9)
BILIRUB SERPL-MCNC: 1 MG/DL (ref 0.1–1)
BUN SERPL-MCNC: 18 MG/DL (ref 8–23)
CALCIUM SERPL-MCNC: 7.5 MG/DL (ref 8.7–10.5)
CHLORIDE SERPL-SCNC: 108 MMOL/L (ref 95–110)
CO2 SERPL-SCNC: 21 MMOL/L (ref 23–29)
CREAT SERPL-MCNC: 2 MG/DL (ref 0.5–1.4)
DIFFERENTIAL METHOD: ABNORMAL
EOSINOPHIL NFR BLD: 0 % (ref 0–8)
ERYTHROCYTE [DISTWIDTH] IN BLOOD BY AUTOMATED COUNT: 13.8 % (ref 11.5–14.5)
EST. GFR  (AFRICAN AMERICAN): 29 ML/MIN/1.73 M^2
EST. GFR  (NON AFRICAN AMERICAN): 25 ML/MIN/1.73 M^2
GLUCOSE SERPL-MCNC: 89 MG/DL (ref 70–110)
HCT VFR BLD AUTO: 25.8 % (ref 37–48.5)
HGB BLD-MCNC: 8.8 G/DL (ref 12–16)
IMM GRANULOCYTES # BLD AUTO: ABNORMAL K/UL (ref 0–0.04)
IMM GRANULOCYTES NFR BLD AUTO: ABNORMAL % (ref 0–0.5)
LYMPHOCYTES NFR BLD: 7 % (ref 18–48)
MCH RBC QN AUTO: 29 PG (ref 27–31)
MCHC RBC AUTO-ENTMCNC: 34.1 G/DL (ref 32–36)
MCV RBC AUTO: 85 FL (ref 82–98)
METAMYELOCYTES NFR BLD MANUAL: 4 %
MONOCYTES NFR BLD: 4 % (ref 4–15)
MYELOCYTES NFR BLD MANUAL: 2 %
NEUTROPHILS NFR BLD: 80 % (ref 38–73)
NEUTS BAND NFR BLD MANUAL: 3 %
NRBC BLD-RTO: 1 /100 WBC
PLATELET # BLD AUTO: 117 K/UL (ref 150–450)
PMV BLD AUTO: 11.2 FL (ref 9.2–12.9)
POCT GLUCOSE: 103 MG/DL (ref 70–110)
POCT GLUCOSE: 107 MG/DL (ref 70–110)
POCT GLUCOSE: 147 MG/DL (ref 70–110)
POTASSIUM SERPL-SCNC: 3.4 MMOL/L (ref 3.5–5.1)
PROT SERPL-MCNC: 5.9 G/DL (ref 6–8.4)
RBC # BLD AUTO: 3.03 M/UL (ref 4–5.4)
SODIUM SERPL-SCNC: 143 MMOL/L (ref 136–145)
WBC # BLD AUTO: 11.68 K/UL (ref 3.9–12.7)

## 2021-11-26 PROCEDURE — 25000003 PHARM REV CODE 250: Mod: HCNC | Performed by: FAMILY MEDICINE

## 2021-11-26 PROCEDURE — 93010 ELECTROCARDIOGRAM REPORT: CPT | Mod: HCNC,,, | Performed by: STUDENT IN AN ORGANIZED HEALTH CARE EDUCATION/TRAINING PROGRAM

## 2021-11-26 PROCEDURE — 63600175 PHARM REV CODE 636 W HCPCS: Mod: HCNC | Performed by: FAMILY MEDICINE

## 2021-11-26 PROCEDURE — 63700000 PHARM REV CODE 250 ALT 637 W/O HCPCS: Mod: HCNC | Performed by: FAMILY MEDICINE

## 2021-11-26 PROCEDURE — 25000003 PHARM REV CODE 250: Mod: HCNC | Performed by: STUDENT IN AN ORGANIZED HEALTH CARE EDUCATION/TRAINING PROGRAM

## 2021-11-26 PROCEDURE — 93010 EKG 12-LEAD: ICD-10-PCS | Mod: HCNC,,, | Performed by: STUDENT IN AN ORGANIZED HEALTH CARE EDUCATION/TRAINING PROGRAM

## 2021-11-26 PROCEDURE — 85007 BL SMEAR W/DIFF WBC COUNT: CPT | Mod: HCNC | Performed by: NURSE PRACTITIONER

## 2021-11-26 PROCEDURE — 63600175 PHARM REV CODE 636 W HCPCS: Mod: HCNC | Performed by: INTERNAL MEDICINE

## 2021-11-26 PROCEDURE — 99233 SBSQ HOSP IP/OBS HIGH 50: CPT | Mod: HCNC,,, | Performed by: INTERNAL MEDICINE

## 2021-11-26 PROCEDURE — 93005 ELECTROCARDIOGRAM TRACING: CPT | Mod: HCNC

## 2021-11-26 PROCEDURE — 25000003 PHARM REV CODE 250: Mod: HCNC | Performed by: INTERNAL MEDICINE

## 2021-11-26 PROCEDURE — 99233 PR SUBSEQUENT HOSPITAL CARE,LEVL III: ICD-10-PCS | Mod: HCNC,,, | Performed by: INTERNAL MEDICINE

## 2021-11-26 PROCEDURE — 80053 COMPREHEN METABOLIC PANEL: CPT | Mod: HCNC | Performed by: NURSE PRACTITIONER

## 2021-11-26 PROCEDURE — 85027 COMPLETE CBC AUTOMATED: CPT | Mod: HCNC | Performed by: NURSE PRACTITIONER

## 2021-11-26 PROCEDURE — 94761 N-INVAS EAR/PLS OXIMETRY MLT: CPT | Mod: HCNC

## 2021-11-26 RX ORDER — VANCOMYCIN HYDROCHLORIDE 125 MG/1
125 CAPSULE ORAL EVERY 6 HOURS
Qty: 32 CAPSULE | Refills: 0 | Status: SHIPPED | OUTPATIENT
Start: 2021-11-26 | End: 2021-12-04

## 2021-11-26 RX ORDER — CARVEDILOL 3.12 MG/1
3.12 TABLET ORAL 2 TIMES DAILY
Status: DISCONTINUED | OUTPATIENT
Start: 2021-11-26 | End: 2021-11-26 | Stop reason: HOSPADM

## 2021-11-26 RX ORDER — HEPARIN 100 UNIT/ML
100 SYRINGE INTRAVENOUS
Status: DISCONTINUED | OUTPATIENT
Start: 2021-11-26 | End: 2021-11-26 | Stop reason: HOSPADM

## 2021-11-26 RX ORDER — CARVEDILOL 3.12 MG/1
3.12 TABLET ORAL 2 TIMES DAILY
Qty: 60 TABLET | Refills: 11 | Status: SHIPPED | OUTPATIENT
Start: 2021-11-26 | End: 2021-12-16 | Stop reason: SDUPTHER

## 2021-11-26 RX ADMIN — VANCOMYCIN HYDROCHLORIDE 125 MG: KIT at 05:11

## 2021-11-26 RX ADMIN — HYDRALAZINE HYDROCHLORIDE 100 MG: 50 TABLET ORAL at 05:11

## 2021-11-26 RX ADMIN — VANCOMYCIN HYDROCHLORIDE 125 MG: KIT at 12:11

## 2021-11-26 RX ADMIN — HEPARIN 100 UNITS: 100 SYRINGE at 03:11

## 2021-11-26 RX ADMIN — FLUCONAZOLE 100 MG: 100 TABLET ORAL at 08:11

## 2021-11-26 RX ADMIN — CARVEDILOL 3.12 MG: 3.12 TABLET, FILM COATED ORAL at 11:11

## 2021-11-26 RX ADMIN — CYCLOSPORINE 75 MG: 25 CAPSULE, LIQUID FILLED ORAL at 08:11

## 2021-11-26 RX ADMIN — ASPIRIN 81 MG: 81 TABLET, COATED ORAL at 08:11

## 2021-11-26 RX ADMIN — HYDRALAZINE HYDROCHLORIDE 100 MG: 50 TABLET ORAL at 01:11

## 2021-11-26 RX ADMIN — AMLODIPINE BESYLATE 2.5 MG: 2.5 TABLET ORAL at 08:11

## 2021-11-26 RX ADMIN — VANCOMYCIN HYDROCHLORIDE 125 MG: KIT at 11:11

## 2021-11-27 LAB
BACTERIA BLD CULT: NORMAL
BACTERIA BLD CULT: NORMAL

## 2021-11-29 ENCOUNTER — LAB VISIT (OUTPATIENT)
Dept: LAB | Facility: HOSPITAL | Age: 66
End: 2021-11-29
Attending: INTERNAL MEDICINE
Payer: MEDICARE

## 2021-11-29 ENCOUNTER — OFFICE VISIT (OUTPATIENT)
Dept: HEMATOLOGY/ONCOLOGY | Facility: CLINIC | Age: 66
End: 2021-11-29
Payer: MEDICARE

## 2021-11-29 ENCOUNTER — DOCUMENTATION ONLY (OUTPATIENT)
Dept: HEMATOLOGY/ONCOLOGY | Facility: CLINIC | Age: 66
End: 2021-11-29
Payer: MEDICARE

## 2021-11-29 VITALS
BODY MASS INDEX: 32.37 KG/M2 | OXYGEN SATURATION: 98 % | DIASTOLIC BLOOD PRESSURE: 75 MMHG | SYSTOLIC BLOOD PRESSURE: 124 MMHG | TEMPERATURE: 98 F | HEIGHT: 62 IN | WEIGHT: 175.94 LBS | HEART RATE: 110 BPM

## 2021-11-29 DIAGNOSIS — K12.31 MUCOSITIS DUE TO ANTINEOPLASTIC THERAPY: Primary | ICD-10-CM

## 2021-11-29 DIAGNOSIS — A04.72 C. DIFFICILE COLITIS: ICD-10-CM

## 2021-11-29 DIAGNOSIS — D05.12 DUCTAL CARCINOMA IN SITU (DCIS) OF LEFT BREAST: ICD-10-CM

## 2021-11-29 DIAGNOSIS — D84.822 IMMUNODEFICIENCY SECONDARY TO RADIATION THERAPY: ICD-10-CM

## 2021-11-29 DIAGNOSIS — D84.821 IMMUNODEFICIENCY DUE TO CHEMOTHERAPY: ICD-10-CM
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
## 2021-11-29 DIAGNOSIS — Z79.899 IMMUNODEFICIENCY DUE TO CHEMOTHERAPY: ICD-10-CM

## 2021-11-29 DIAGNOSIS — D69.6 THROMBOCYTOPENIA, UNSPECIFIED: ICD-10-CM

## 2021-11-29 DIAGNOSIS — T45.1X5A IMMUNODEFICIENCY DUE TO CHEMOTHERAPY: ICD-10-CM

## 2021-11-29 DIAGNOSIS — D84.9 IMMUNOCOMPROMISED PATIENT: ICD-10-CM

## 2021-11-29 PROBLEM — Z79.69 IMMUNODEFICIENCY DUE TO CHEMOTHERAPY: Status: ACTIVE | Noted: 2021-11-29

## 2021-11-29 LAB
ALBUMIN SERPL BCP-MCNC: 2.5 G/DL (ref 3.5–5.2)
ALP SERPL-CCNC: 153 U/L (ref 55–135)
ALT SERPL W/O P-5'-P-CCNC: 36 U/L (ref 10–44)
ANION GAP SERPL CALC-SCNC: 11 MMOL/L (ref 8–16)
ANISOCYTOSIS BLD QL SMEAR: SLIGHT
AST SERPL-CCNC: 53 U/L (ref 10–40)
BASOPHILS NFR BLD: 0 % (ref 0–1.9)
BILIRUB SERPL-MCNC: 0.7 MG/DL (ref 0.1–1)
BUN SERPL-MCNC: 25 MG/DL (ref 8–23)
CALCIUM SERPL-MCNC: 7.6 MG/DL (ref 8.7–10.5)
CHLORIDE SERPL-SCNC: 109 MMOL/L (ref 95–110)
CO2 SERPL-SCNC: 23 MMOL/L (ref 23–29)
CREAT SERPL-MCNC: 2.2 MG/DL (ref 0.5–1.4)
DIFFERENTIAL METHOD: ABNORMAL
EOSINOPHIL NFR BLD: 0 % (ref 0–8)
ERYTHROCYTE [DISTWIDTH] IN BLOOD BY AUTOMATED COUNT: 15.1 % (ref 11.5–14.5)
EST. GFR  (AFRICAN AMERICAN): 26 ML/MIN/1.73 M^2
EST. GFR  (NON AFRICAN AMERICAN): 23 ML/MIN/1.73 M^2
GLUCOSE SERPL-MCNC: 105 MG/DL (ref 70–110)
HCT VFR BLD AUTO: 27.5 % (ref 37–48.5)
HGB BLD-MCNC: 8.9 G/DL (ref 12–16)
IMM GRANULOCYTES # BLD AUTO: ABNORMAL K/UL (ref 0–0.04)
IMM GRANULOCYTES NFR BLD AUTO: ABNORMAL % (ref 0–0.5)
LYMPHOCYTES NFR BLD: 6 % (ref 18–48)
MCH RBC QN AUTO: 28.9 PG (ref 27–31)
MCHC RBC AUTO-ENTMCNC: 32.4 G/DL (ref 32–36)
MCV RBC AUTO: 89 FL (ref 82–98)
METAMYELOCYTES NFR BLD MANUAL: 5 %
MONOCYTES NFR BLD: 1 % (ref 4–15)
MYELOCYTES NFR BLD MANUAL: 3 %
NEUTROPHILS NFR BLD: 80 % (ref 38–73)
NEUTS BAND NFR BLD MANUAL: 5 %
NRBC BLD-RTO: 2 /100 WBC
OVALOCYTES BLD QL SMEAR: ABNORMAL
PLATELET # BLD AUTO: 109 K/UL (ref 150–450)
PLATELET BLD QL SMEAR: ABNORMAL
PMV BLD AUTO: 10.4 FL (ref 9.2–12.9)
POIKILOCYTOSIS BLD QL SMEAR: SLIGHT
POTASSIUM SERPL-SCNC: 4.5 MMOL/L (ref 3.5–5.1)
PROT SERPL-MCNC: 5.7 G/DL (ref 6–8.4)
RBC # BLD AUTO: 3.08 M/UL (ref 4–5.4)
SODIUM SERPL-SCNC: 143 MMOL/L (ref 136–145)
STOMATOCYTES BLD QL SMEAR: PRESENT
TARGETS BLD QL SMEAR: ABNORMAL
WBC # BLD AUTO: 7.48 K/UL (ref 3.9–12.7)

## 2021-11-29 PROCEDURE — 4010F ACE/ARB THERAPY RXD/TAKEN: CPT | Mod: HCNC,CPTII,S$GLB, | Performed by: INTERNAL MEDICINE

## 2021-11-29 PROCEDURE — 99499 RISK ADDL DX/OHS AUDIT: ICD-10-PCS | Mod: S$GLB,,, | Performed by: INTERNAL MEDICINE

## 2021-11-29 PROCEDURE — 36415 COLL VENOUS BLD VENIPUNCTURE: CPT | Mod: HCNC | Performed by: INTERNAL MEDICINE

## 2021-11-29 PROCEDURE — 99499 UNLISTED E&M SERVICE: CPT | Mod: S$GLB,,, | Performed by: INTERNAL MEDICINE

## 2021-11-29 PROCEDURE — 99214 PR OFFICE/OUTPT VISIT, EST, LEVL IV, 30-39 MIN: ICD-10-PCS | Mod: HCNC,S$GLB,, | Performed by: INTERNAL MEDICINE

## 2021-11-29 PROCEDURE — 4010F PR ACE/ARB THEARPY RXD/TAKEN: ICD-10-PCS | Mod: HCNC,CPTII,S$GLB, | Performed by: INTERNAL MEDICINE

## 2021-11-29 PROCEDURE — 3066F PR DOCUMENTATION OF TREATMENT FOR NEPHROPATHY: ICD-10-PCS | Mod: HCNC,CPTII,S$GLB, | Performed by: INTERNAL MEDICINE

## 2021-11-29 PROCEDURE — 99999 PR PBB SHADOW E&M-EST. PATIENT-LVL III: ICD-10-PCS | Mod: PBBFAC,HCNC,, | Performed by: INTERNAL MEDICINE

## 2021-11-29 PROCEDURE — 85007 BL SMEAR W/DIFF WBC COUNT: CPT | Mod: HCNC | Performed by: INTERNAL MEDICINE

## 2021-11-29 PROCEDURE — 3066F NEPHROPATHY DOC TX: CPT | Mod: HCNC,CPTII,S$GLB, | Performed by: INTERNAL MEDICINE

## 2021-11-29 PROCEDURE — 80053 COMPREHEN METABOLIC PANEL: CPT | Mod: HCNC | Performed by: INTERNAL MEDICINE

## 2021-11-29 PROCEDURE — 99214 OFFICE O/P EST MOD 30 MIN: CPT | Mod: HCNC,S$GLB,, | Performed by: INTERNAL MEDICINE

## 2021-11-29 PROCEDURE — 99999 PR PBB SHADOW E&M-EST. PATIENT-LVL III: CPT | Mod: PBBFAC,HCNC,, | Performed by: INTERNAL MEDICINE

## 2021-11-29 PROCEDURE — 85027 COMPLETE CBC AUTOMATED: CPT | Mod: HCNC | Performed by: INTERNAL MEDICINE

## 2021-11-30 ENCOUNTER — TELEPHONE (OUTPATIENT)
Dept: PRIMARY CARE CLINIC | Facility: CLINIC | Age: 66
End: 2021-11-30
Payer: MEDICARE

## 2021-12-02 ENCOUNTER — HOSPITAL ENCOUNTER (OUTPATIENT)
Dept: RADIOLOGY | Facility: HOSPITAL | Age: 66
Discharge: HOME | End: 2021-12-02
Attending: NURSE PRACTITIONER
Payer: MEDICARE

## 2021-12-02 ENCOUNTER — HOSPITAL ENCOUNTER (OUTPATIENT)
Dept: CARDIOLOGY | Facility: HOSPITAL | Age: 66
Discharge: HOME | End: 2021-12-02
Attending: NURSE PRACTITIONER
Payer: MEDICARE

## 2021-12-02 ENCOUNTER — OFFICE VISIT (OUTPATIENT)
Dept: PRIMARY CARE CLINIC | Facility: CLINIC | Age: 66
End: 2021-12-02
Payer: MEDICARE

## 2021-12-02 VITALS
TEMPERATURE: 98 F | DIASTOLIC BLOOD PRESSURE: 80 MMHG | HEART RATE: 102 BPM | HEIGHT: 62 IN | BODY MASS INDEX: 32.33 KG/M2 | SYSTOLIC BLOOD PRESSURE: 128 MMHG | OXYGEN SATURATION: 97 % | WEIGHT: 175.69 LBS

## 2021-12-02 DIAGNOSIS — D61.811 DRUG-INDUCED PANCYTOPENIA: Primary | ICD-10-CM

## 2021-12-02 DIAGNOSIS — R07.9 CHEST PAIN, UNSPECIFIED TYPE: ICD-10-CM

## 2021-12-02 DIAGNOSIS — D05.12 DUCTAL CARCINOMA IN SITU (DCIS) OF LEFT BREAST: ICD-10-CM

## 2021-12-02 DIAGNOSIS — R06.09 DOE (DYSPNEA ON EXERTION): ICD-10-CM

## 2021-12-02 DIAGNOSIS — Z94.1 HEART TRANSPLANTED: ICD-10-CM

## 2021-12-02 DIAGNOSIS — F41.9 ANXIETY: ICD-10-CM

## 2021-12-02 DIAGNOSIS — N18.4 CKD (CHRONIC KIDNEY DISEASE) STAGE 4, GFR 15-29 ML/MIN: ICD-10-CM

## 2021-12-02 DIAGNOSIS — A04.72 C. DIFFICILE COLITIS: ICD-10-CM

## 2021-12-02 DIAGNOSIS — I10 ESSENTIAL HYPERTENSION: ICD-10-CM

## 2021-12-02 DIAGNOSIS — M62.838 MUSCLE SPASMS OF BOTH LOWER EXTREMITIES: ICD-10-CM

## 2021-12-02 PROBLEM — N18.30 STAGE 3 CHRONIC KIDNEY DISEASE: Status: RESOLVED | Noted: 2019-07-29 | Resolved: 2021-12-02

## 2021-12-02 PROCEDURE — 71046 X-RAY EXAM CHEST 2 VIEWS: CPT | Mod: 26,HCNC,, | Performed by: RADIOLOGY

## 2021-12-02 PROCEDURE — 93005 ELECTROCARDIOGRAM TRACING: CPT | Mod: HCNC,S$GLB,, | Performed by: NURSE PRACTITIONER

## 2021-12-02 PROCEDURE — 93010 EKG 12-LEAD: ICD-10-PCS | Mod: HCNC,S$GLB,, | Performed by: INTERNAL MEDICINE

## 2021-12-02 PROCEDURE — 99999 PR PBB SHADOW E&M-EST. PATIENT-LVL V: CPT | Mod: PBBFAC,HCNC,, | Performed by: NURSE PRACTITIONER

## 2021-12-02 PROCEDURE — 71046 XR CHEST PA AND LATERAL: ICD-10-PCS | Mod: 26,HCNC,, | Performed by: RADIOLOGY

## 2021-12-02 PROCEDURE — 99499 RISK ADDL DX/OHS AUDIT: ICD-10-PCS | Mod: HCNC,S$GLB,, | Performed by: NURSE PRACTITIONER

## 2021-12-02 PROCEDURE — 71046 X-RAY EXAM CHEST 2 VIEWS: CPT | Mod: TC,HCNC

## 2021-12-02 PROCEDURE — 99215 PR OFFICE/OUTPT VISIT, EST, LEVL V, 40-54 MIN: ICD-10-PCS | Mod: HCNC,S$GLB,, | Performed by: NURSE PRACTITIONER

## 2021-12-02 PROCEDURE — 93005 EKG 12-LEAD: ICD-10-PCS | Mod: HCNC,S$GLB,, | Performed by: NURSE PRACTITIONER

## 2021-12-02 PROCEDURE — 99999 PR PBB SHADOW E&M-EST. PATIENT-LVL V: ICD-10-PCS | Mod: PBBFAC,HCNC,, | Performed by: NURSE PRACTITIONER

## 2021-12-02 PROCEDURE — 99499 UNLISTED E&M SERVICE: CPT | Mod: HCNC,S$GLB,, | Performed by: NURSE PRACTITIONER

## 2021-12-02 PROCEDURE — 99417 PR PROLONGED SVC, OUTPT, W/WO DIRECT PT CONTACT,  EA ADDTL 15 MIN: ICD-10-PCS | Mod: HCNC,S$GLB,, | Performed by: NURSE PRACTITIONER

## 2021-12-02 PROCEDURE — 99417 PROLNG OP E/M EACH 15 MIN: CPT | Mod: HCNC,S$GLB,, | Performed by: NURSE PRACTITIONER

## 2021-12-02 PROCEDURE — 3066F NEPHROPATHY DOC TX: CPT | Mod: HCNC,CPTII,S$GLB, | Performed by: NURSE PRACTITIONER

## 2021-12-02 PROCEDURE — 3066F PR DOCUMENTATION OF TREATMENT FOR NEPHROPATHY: ICD-10-PCS | Mod: HCNC,CPTII,S$GLB, | Performed by: NURSE PRACTITIONER

## 2021-12-02 PROCEDURE — 93010 ELECTROCARDIOGRAM REPORT: CPT | Mod: HCNC,S$GLB,, | Performed by: INTERNAL MEDICINE

## 2021-12-02 PROCEDURE — 4010F ACE/ARB THERAPY RXD/TAKEN: CPT | Mod: HCNC,CPTII,S$GLB, | Performed by: NURSE PRACTITIONER

## 2021-12-02 PROCEDURE — 4010F PR ACE/ARB THEARPY RXD/TAKEN: ICD-10-PCS | Mod: HCNC,CPTII,S$GLB, | Performed by: NURSE PRACTITIONER

## 2021-12-02 PROCEDURE — 99215 OFFICE O/P EST HI 40 MIN: CPT | Mod: HCNC,S$GLB,, | Performed by: NURSE PRACTITIONER

## 2021-12-02 RX ORDER — FUROSEMIDE 20 MG/1
TABLET ORAL
Qty: 370 TABLET | Refills: 0 | Status: SHIPPED | OUTPATIENT
Start: 2021-12-02 | End: 2023-02-01

## 2021-12-02 RX ORDER — LOSARTAN POTASSIUM 25 MG/1
25 TABLET ORAL DAILY
Qty: 90 TABLET | Refills: 3 | Status: SHIPPED | OUTPATIENT
Start: 2021-12-02 | End: 2022-09-02

## 2021-12-02 RX ORDER — BUSPIRONE HYDROCHLORIDE 10 MG/1
10 TABLET ORAL DAILY
Qty: 90 TABLET | Refills: 3 | Status: SHIPPED | OUTPATIENT
Start: 2021-12-02 | End: 2023-01-04 | Stop reason: SDUPTHER

## 2021-12-02 RX ORDER — BACLOFEN 10 MG/1
10 TABLET ORAL EVERY MORNING
Qty: 90 TABLET | Refills: 1 | Status: SHIPPED | OUTPATIENT
Start: 2021-12-02 | End: 2022-01-11

## 2021-12-02 RX ORDER — AMLODIPINE BESYLATE 2.5 MG/1
2.5 TABLET ORAL DAILY
Qty: 90 TABLET | Refills: 3 | Status: SHIPPED | OUTPATIENT
Start: 2021-12-02 | End: 2022-01-10

## 2021-12-03 PROCEDURE — G0180 PR HOME HEALTH MD CERTIFICATION: ICD-10-PCS | Mod: ,,, | Performed by: NURSE PRACTITIONER

## 2021-12-03 PROCEDURE — G0180 MD CERTIFICATION HHA PATIENT: HCPCS | Mod: ,,, | Performed by: NURSE PRACTITIONER

## 2021-12-07 ENCOUNTER — OFFICE VISIT (OUTPATIENT)
Dept: HEMATOLOGY/ONCOLOGY | Facility: CLINIC | Age: 66
End: 2021-12-07
Payer: MEDICARE

## 2021-12-07 ENCOUNTER — OFFICE VISIT (OUTPATIENT)
Dept: OTOLARYNGOLOGY | Facility: CLINIC | Age: 66
End: 2021-12-07
Payer: MEDICARE

## 2021-12-07 ENCOUNTER — DOCUMENTATION ONLY (OUTPATIENT)
Dept: HEMATOLOGY/ONCOLOGY | Facility: CLINIC | Age: 66
End: 2021-12-07
Payer: MEDICARE

## 2021-12-07 ENCOUNTER — LAB VISIT (OUTPATIENT)
Dept: LAB | Facility: HOSPITAL | Age: 66
End: 2021-12-07
Attending: INTERNAL MEDICINE
Payer: MEDICARE

## 2021-12-07 VITALS
HEIGHT: 62 IN | WEIGHT: 168 LBS | WEIGHT: 168 LBS | RESPIRATION RATE: 18 BRPM | HEIGHT: 62 IN | BODY MASS INDEX: 30.91 KG/M2 | HEART RATE: 114 BPM | DIASTOLIC BLOOD PRESSURE: 91 MMHG | SYSTOLIC BLOOD PRESSURE: 132 MMHG | TEMPERATURE: 98 F | OXYGEN SATURATION: 98 % | BODY MASS INDEX: 30.91 KG/M2
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
## 2021-12-07 DIAGNOSIS — D84.821 IMMUNODEFICIENCY DUE TO CHEMOTHERAPY: ICD-10-CM

## 2021-12-07 DIAGNOSIS — D05.12 DUCTAL CARCINOMA IN SITU (DCIS) OF LEFT BREAST: ICD-10-CM

## 2021-12-07 DIAGNOSIS — I10 ESSENTIAL HYPERTENSION: ICD-10-CM

## 2021-12-07 DIAGNOSIS — T45.1X5A IMMUNODEFICIENCY DUE TO CHEMOTHERAPY: ICD-10-CM

## 2021-12-07 DIAGNOSIS — Z94.1 HEART TRANSPLANTED: Chronic | ICD-10-CM

## 2021-12-07 DIAGNOSIS — D84.9 IMMUNOCOMPROMISED PATIENT: Primary | ICD-10-CM

## 2021-12-07 DIAGNOSIS — Z79.899 IMMUNODEFICIENCY DUE TO CHEMOTHERAPY: ICD-10-CM

## 2021-12-07 DIAGNOSIS — H92.01 RIGHT EAR PAIN: Primary | ICD-10-CM

## 2021-12-07 DIAGNOSIS — D84.822 IMMUNODEFICIENCY SECONDARY TO RADIATION THERAPY: ICD-10-CM

## 2021-12-07 DIAGNOSIS — N18.4 CKD (CHRONIC KIDNEY DISEASE) STAGE 4, GFR 15-29 ML/MIN: ICD-10-CM

## 2021-12-07 LAB
ALBUMIN SERPL BCP-MCNC: 2.9 G/DL (ref 3.5–5.2)
ALP SERPL-CCNC: 110 U/L (ref 55–135)
ALT SERPL W/O P-5'-P-CCNC: 23 U/L (ref 10–44)
ANION GAP SERPL CALC-SCNC: 11 MMOL/L (ref 8–16)
AST SERPL-CCNC: 33 U/L (ref 10–40)
BASOPHILS # BLD AUTO: 0.03 K/UL (ref 0–0.2)
BASOPHILS NFR BLD: 0.8 % (ref 0–1.9)
BILIRUB SERPL-MCNC: 0.6 MG/DL (ref 0.1–1)
BUN SERPL-MCNC: 26 MG/DL (ref 8–23)
CALCIUM SERPL-MCNC: 9.1 MG/DL (ref 8.7–10.5)
CHLORIDE SERPL-SCNC: 105 MMOL/L (ref 95–110)
CO2 SERPL-SCNC: 25 MMOL/L (ref 23–29)
CREAT SERPL-MCNC: 2.7 MG/DL (ref 0.5–1.4)
DIFFERENTIAL METHOD: ABNORMAL
EOSINOPHIL # BLD AUTO: 0 K/UL (ref 0–0.5)
EOSINOPHIL NFR BLD: 0 % (ref 0–8)
ERYTHROCYTE [DISTWIDTH] IN BLOOD BY AUTOMATED COUNT: 15.6 % (ref 11.5–14.5)
EST. GFR  (AFRICAN AMERICAN): 20 ML/MIN/1.73 M^2
EST. GFR  (NON AFRICAN AMERICAN): 18 ML/MIN/1.73 M^2
GLUCOSE SERPL-MCNC: 109 MG/DL (ref 70–110)
HCT VFR BLD AUTO: 28.9 % (ref 37–48.5)
HGB BLD-MCNC: 9.2 G/DL (ref 12–16)
IMM GRANULOCYTES # BLD AUTO: 0.02 K/UL (ref 0–0.04)
IMM GRANULOCYTES NFR BLD AUTO: 0.5 % (ref 0–0.5)
LYMPHOCYTES # BLD AUTO: 0.9 K/UL (ref 1–4.8)
LYMPHOCYTES NFR BLD: 22.2 % (ref 18–48)
MCH RBC QN AUTO: 29.4 PG (ref 27–31)
MCHC RBC AUTO-ENTMCNC: 31.8 G/DL (ref 32–36)
MCV RBC AUTO: 92 FL (ref 82–98)
MONOCYTES # BLD AUTO: 0.7 K/UL (ref 0.3–1)
MONOCYTES NFR BLD: 17.8 % (ref 4–15)
NEUTROPHILS # BLD AUTO: 2.3 K/UL (ref 1.8–7.7)
NEUTROPHILS NFR BLD: 58.7 % (ref 38–73)
NRBC BLD-RTO: 0 /100 WBC
PLATELET # BLD AUTO: 257 K/UL (ref 150–450)
PLATELET BLD QL SMEAR: ABNORMAL
PMV BLD AUTO: 9.3 FL (ref 9.2–12.9)
POTASSIUM SERPL-SCNC: 5.6 MMOL/L (ref 3.5–5.1)
PROT SERPL-MCNC: 7.1 G/DL (ref 6–8.4)
RBC # BLD AUTO: 3.13 M/UL (ref 4–5.4)
SODIUM SERPL-SCNC: 141 MMOL/L (ref 136–145)
WBC # BLD AUTO: 3.83 K/UL (ref 3.9–12.7)

## 2021-12-07 PROCEDURE — 99999 PR PBB SHADOW E&M-EST. PATIENT-LVL V: ICD-10-PCS | Mod: PBBFAC,HCNC,, | Performed by: INTERNAL MEDICINE

## 2021-12-07 PROCEDURE — 4010F PR ACE/ARB THEARPY RXD/TAKEN: ICD-10-PCS | Mod: HCNC,CPTII,S$GLB, | Performed by: PHYSICIAN ASSISTANT

## 2021-12-07 PROCEDURE — 31575 DIAGNOSTIC LARYNGOSCOPY: CPT | Mod: HCNC,S$GLB,, | Performed by: ORTHOPAEDIC SURGERY

## 2021-12-07 PROCEDURE — 3066F PR DOCUMENTATION OF TREATMENT FOR NEPHROPATHY: ICD-10-PCS | Mod: HCNC,CPTII,S$GLB, | Performed by: INTERNAL MEDICINE

## 2021-12-07 PROCEDURE — 3066F PR DOCUMENTATION OF TREATMENT FOR NEPHROPATHY: ICD-10-PCS | Mod: HCNC,CPTII,S$GLB, | Performed by: PHYSICIAN ASSISTANT

## 2021-12-07 PROCEDURE — 99999 PR PBB SHADOW E&M-EST. PATIENT-LVL III: ICD-10-PCS | Mod: PBBFAC,HCNC,, | Performed by: PHYSICIAN ASSISTANT

## 2021-12-07 PROCEDURE — 99499 RISK ADDL DX/OHS AUDIT: ICD-10-PCS | Mod: S$GLB,,, | Performed by: INTERNAL MEDICINE

## 2021-12-07 PROCEDURE — 99212 OFFICE O/P EST SF 10 MIN: CPT | Mod: 25,HCNC,S$GLB, | Performed by: PHYSICIAN ASSISTANT

## 2021-12-07 PROCEDURE — 99999 PR PBB SHADOW E&M-EST. PATIENT-LVL V: CPT | Mod: PBBFAC,HCNC,, | Performed by: INTERNAL MEDICINE

## 2021-12-07 PROCEDURE — 3066F NEPHROPATHY DOC TX: CPT | Mod: HCNC,CPTII,S$GLB, | Performed by: INTERNAL MEDICINE

## 2021-12-07 PROCEDURE — 4010F ACE/ARB THERAPY RXD/TAKEN: CPT | Mod: HCNC,CPTII,S$GLB, | Performed by: PHYSICIAN ASSISTANT

## 2021-12-07 PROCEDURE — 99999 PR PBB SHADOW E&M-EST. PATIENT-LVL III: CPT | Mod: PBBFAC,HCNC,, | Performed by: PHYSICIAN ASSISTANT

## 2021-12-07 PROCEDURE — 4010F PR ACE/ARB THEARPY RXD/TAKEN: ICD-10-PCS | Mod: HCNC,CPTII,S$GLB, | Performed by: INTERNAL MEDICINE

## 2021-12-07 PROCEDURE — 99212 PR OFFICE/OUTPT VISIT, EST, LEVL II, 10-19 MIN: ICD-10-PCS | Mod: 25,HCNC,S$GLB, | Performed by: PHYSICIAN ASSISTANT

## 2021-12-07 PROCEDURE — 80053 COMPREHEN METABOLIC PANEL: CPT | Mod: HCNC | Performed by: INTERNAL MEDICINE

## 2021-12-07 PROCEDURE — 4010F ACE/ARB THERAPY RXD/TAKEN: CPT | Mod: HCNC,CPTII,S$GLB, | Performed by: INTERNAL MEDICINE

## 2021-12-07 PROCEDURE — 31575 PR LARYNGOSCOPY, FLEXIBLE; DIAGNOSTIC: ICD-10-PCS | Mod: HCNC,S$GLB,, | Performed by: ORTHOPAEDIC SURGERY

## 2021-12-07 PROCEDURE — 99214 PR OFFICE/OUTPT VISIT, EST, LEVL IV, 30-39 MIN: ICD-10-PCS | Mod: HCNC,S$GLB,, | Performed by: INTERNAL MEDICINE

## 2021-12-07 PROCEDURE — 99214 OFFICE O/P EST MOD 30 MIN: CPT | Mod: HCNC,S$GLB,, | Performed by: INTERNAL MEDICINE

## 2021-12-07 PROCEDURE — 36415 COLL VENOUS BLD VENIPUNCTURE: CPT | Mod: HCNC | Performed by: INTERNAL MEDICINE

## 2021-12-07 PROCEDURE — 85025 COMPLETE CBC W/AUTO DIFF WBC: CPT | Mod: HCNC | Performed by: INTERNAL MEDICINE

## 2021-12-07 PROCEDURE — 99499 UNLISTED E&M SERVICE: CPT | Mod: S$GLB,,, | Performed by: INTERNAL MEDICINE

## 2021-12-07 PROCEDURE — 3066F NEPHROPATHY DOC TX: CPT | Mod: HCNC,CPTII,S$GLB, | Performed by: PHYSICIAN ASSISTANT

## 2021-12-11 ENCOUNTER — TELEPHONE (OUTPATIENT)
Dept: TRANSPLANT | Facility: CLINIC | Age: 66
End: 2021-12-11
Payer: MEDICARE

## 2021-12-12 ENCOUNTER — HOSPITAL ENCOUNTER (EMERGENCY)
Facility: HOSPITAL | Age: 66
Discharge: HOME | End: 2021-12-12
Attending: EMERGENCY MEDICINE
Payer: MEDICARE

## 2021-12-12 VITALS
RESPIRATION RATE: 20 BRPM | WEIGHT: 156.5 LBS | BODY MASS INDEX: 28.63 KG/M2 | HEART RATE: 96 BPM | DIASTOLIC BLOOD PRESSURE: 70 MMHG | OXYGEN SATURATION: 99 % | TEMPERATURE: 99 F | SYSTOLIC BLOOD PRESSURE: 125 MMHG

## 2021-12-12 DIAGNOSIS — S46.912A STRAIN OF LEFT SHOULDER, INITIAL ENCOUNTER: Primary | ICD-10-CM

## 2021-12-12 DIAGNOSIS — D64.9 ANEMIA, UNSPECIFIED TYPE: ICD-10-CM

## 2021-12-12 DIAGNOSIS — M25.512 LEFT SHOULDER PAIN: ICD-10-CM

## 2021-12-12 LAB
ALBUMIN SERPL BCP-MCNC: 2.9 G/DL (ref 3.5–5.2)
ALP SERPL-CCNC: 100 U/L (ref 55–135)
ALT SERPL W/O P-5'-P-CCNC: 23 U/L (ref 10–44)
ANION GAP SERPL CALC-SCNC: 11 MMOL/L (ref 8–16)
AST SERPL-CCNC: 40 U/L (ref 10–40)
BASOPHILS # BLD AUTO: 0.03 K/UL (ref 0–0.2)
BASOPHILS NFR BLD: 0.6 % (ref 0–1.9)
BILIRUB SERPL-MCNC: 0.7 MG/DL (ref 0.1–1)
BNP SERPL-MCNC: 116 PG/ML (ref 0–99)
BUN SERPL-MCNC: 41 MG/DL (ref 8–23)
CALCIUM SERPL-MCNC: 9.1 MG/DL (ref 8.7–10.5)
CHLORIDE SERPL-SCNC: 103 MMOL/L (ref 95–110)
CO2 SERPL-SCNC: 22 MMOL/L (ref 23–29)
CREAT SERPL-MCNC: 3 MG/DL (ref 0.5–1.4)
DIFFERENTIAL METHOD: ABNORMAL
EOSINOPHIL # BLD AUTO: 0 K/UL (ref 0–0.5)
EOSINOPHIL NFR BLD: 0.8 % (ref 0–8)
ERYTHROCYTE [DISTWIDTH] IN BLOOD BY AUTOMATED COUNT: 15.2 % (ref 11.5–14.5)
EST. GFR  (AFRICAN AMERICAN): 18 ML/MIN/1.73 M^2
EST. GFR  (NON AFRICAN AMERICAN): 15 ML/MIN/1.73 M^2
GLUCOSE SERPL-MCNC: 106 MG/DL (ref 70–110)
HCT VFR BLD AUTO: 27.3 % (ref 37–48.5)
HGB BLD-MCNC: 8.8 G/DL (ref 12–16)
IMM GRANULOCYTES # BLD AUTO: 0.03 K/UL (ref 0–0.04)
IMM GRANULOCYTES NFR BLD AUTO: 0.6 % (ref 0–0.5)
LYMPHOCYTES # BLD AUTO: 0.7 K/UL (ref 1–4.8)
LYMPHOCYTES NFR BLD: 13.6 % (ref 18–48)
MCH RBC QN AUTO: 28.8 PG (ref 27–31)
MCHC RBC AUTO-ENTMCNC: 32.2 G/DL (ref 32–36)
MCV RBC AUTO: 89 FL (ref 82–98)
MONOCYTES # BLD AUTO: 1 K/UL (ref 0.3–1)
MONOCYTES NFR BLD: 19.5 % (ref 4–15)
NEUTROPHILS # BLD AUTO: 3.2 K/UL (ref 1.8–7.7)
NEUTROPHILS NFR BLD: 64.9 % (ref 38–73)
NRBC BLD-RTO: 0 /100 WBC
PLATELET # BLD AUTO: 301 K/UL (ref 150–450)
PMV BLD AUTO: 9.6 FL (ref 9.2–12.9)
POTASSIUM SERPL-SCNC: 4.8 MMOL/L (ref 3.5–5.1)
PROT SERPL-MCNC: 7.2 G/DL (ref 6–8.4)
RBC # BLD AUTO: 3.06 M/UL (ref 4–5.4)
SODIUM SERPL-SCNC: 136 MMOL/L (ref 136–145)
TROPONIN I SERPL DL<=0.01 NG/ML-MCNC: 0.02 NG/ML (ref 0–0.03)
TROPONIN I SERPL DL<=0.01 NG/ML-MCNC: 0.02 NG/ML (ref 0–0.03)
WBC # BLD AUTO: 4.87 K/UL (ref 3.9–12.7)

## 2021-12-12 PROCEDURE — 83880 ASSAY OF NATRIURETIC PEPTIDE: CPT | Mod: HCNC | Performed by: EMERGENCY MEDICINE

## 2021-12-12 PROCEDURE — 93005 ELECTROCARDIOGRAM TRACING: CPT | Mod: HCNC

## 2021-12-12 PROCEDURE — 96375 TX/PRO/DX INJ NEW DRUG ADDON: CPT | Mod: HCNC

## 2021-12-12 PROCEDURE — 93010 EKG 12-LEAD: ICD-10-PCS | Mod: HCNC,,, | Performed by: STUDENT IN AN ORGANIZED HEALTH CARE EDUCATION/TRAINING PROGRAM

## 2021-12-12 PROCEDURE — 84484 ASSAY OF TROPONIN QUANT: CPT | Mod: 91,HCNC | Performed by: EMERGENCY MEDICINE

## 2021-12-12 PROCEDURE — 80053 COMPREHEN METABOLIC PANEL: CPT | Mod: HCNC | Performed by: EMERGENCY MEDICINE

## 2021-12-12 PROCEDURE — 25000003 PHARM REV CODE 250: Mod: HCNC | Performed by: EMERGENCY MEDICINE

## 2021-12-12 PROCEDURE — 63600175 PHARM REV CODE 636 W HCPCS: Mod: HCNC | Performed by: EMERGENCY MEDICINE

## 2021-12-12 PROCEDURE — 85025 COMPLETE CBC W/AUTO DIFF WBC: CPT | Mod: HCNC | Performed by: EMERGENCY MEDICINE

## 2021-12-12 PROCEDURE — 93010 ELECTROCARDIOGRAM REPORT: CPT | Mod: HCNC,,, | Performed by: STUDENT IN AN ORGANIZED HEALTH CARE EDUCATION/TRAINING PROGRAM

## 2021-12-12 PROCEDURE — 96361 HYDRATE IV INFUSION ADD-ON: CPT | Mod: HCNC

## 2021-12-12 PROCEDURE — 96374 THER/PROPH/DIAG INJ IV PUSH: CPT | Mod: HCNC

## 2021-12-12 PROCEDURE — 99285 EMERGENCY DEPT VISIT HI MDM: CPT | Mod: 25,HCNC

## 2021-12-12 RX ORDER — ONDANSETRON 2 MG/ML
4 INJECTION INTRAMUSCULAR; INTRAVENOUS
Status: COMPLETED | OUTPATIENT
Start: 2021-12-12 | End: 2021-12-12

## 2021-12-12 RX ORDER — ASPIRIN 325 MG
325 TABLET ORAL
Status: COMPLETED | OUTPATIENT
Start: 2021-12-12 | End: 2021-12-12

## 2021-12-12 RX ORDER — ACETAMINOPHEN 325 MG/1
325 TABLET ORAL EVERY 6 HOURS PRN
Refills: 0 | COMMUNITY
Start: 2021-12-12 | End: 2022-10-05

## 2021-12-12 RX ORDER — MORPHINE SULFATE 4 MG/ML
6 INJECTION, SOLUTION INTRAMUSCULAR; INTRAVENOUS
Status: COMPLETED | OUTPATIENT
Start: 2021-12-12 | End: 2021-12-12

## 2021-12-12 RX ADMIN — ASPIRIN 325 MG ORAL TABLET 325 MG: 325 PILL ORAL at 01:12

## 2021-12-12 RX ADMIN — SODIUM CHLORIDE 500 ML: 0.9 INJECTION, SOLUTION INTRAVENOUS at 01:12

## 2021-12-12 RX ADMIN — ONDANSETRON 4 MG: 2 INJECTION INTRAMUSCULAR; INTRAVENOUS at 01:12

## 2021-12-12 RX ADMIN — MORPHINE SULFATE 6 MG: 4 INJECTION INTRAVENOUS at 01:12

## 2021-12-13 PROBLEM — D64.9 ANEMIA: Status: ACTIVE | Noted: 2021-12-13

## 2021-12-13 PROBLEM — S46.912A STRAIN OF LEFT SHOULDER: Status: ACTIVE | Noted: 2021-12-13

## 2021-12-13 PROBLEM — M25.512 LEFT SHOULDER PAIN: Status: ACTIVE | Noted: 2021-12-13

## 2021-12-14 ENCOUNTER — DOCUMENT SCAN (OUTPATIENT)
Dept: HOME HEALTH SERVICES | Facility: HOSPITAL | Age: 66
End: 2021-12-14
Payer: MEDICARE

## 2021-12-14 DIAGNOSIS — N17.9 AKI (ACUTE KIDNEY INJURY): Primary | ICD-10-CM

## 2021-12-16 ENCOUNTER — OFFICE VISIT (OUTPATIENT)
Dept: PRIMARY CARE CLINIC | Facility: CLINIC | Age: 66
End: 2021-12-16
Payer: MEDICARE

## 2021-12-16 VITALS
DIASTOLIC BLOOD PRESSURE: 74 MMHG | HEIGHT: 62 IN | WEIGHT: 169.06 LBS | BODY MASS INDEX: 31.11 KG/M2 | HEART RATE: 103 BPM | OXYGEN SATURATION: 97 % | TEMPERATURE: 99 F | SYSTOLIC BLOOD PRESSURE: 118 MMHG

## 2021-12-16 DIAGNOSIS — I50.32 CHRONIC DIASTOLIC HEART FAILURE: ICD-10-CM

## 2021-12-16 DIAGNOSIS — D61.811 DRUG-INDUCED PANCYTOPENIA: ICD-10-CM

## 2021-12-16 DIAGNOSIS — K21.9 GASTROESOPHAGEAL REFLUX DISEASE, UNSPECIFIED WHETHER ESOPHAGITIS PRESENT: Primary | ICD-10-CM

## 2021-12-16 DIAGNOSIS — I10 ESSENTIAL HYPERTENSION: ICD-10-CM

## 2021-12-16 DIAGNOSIS — G56.22 ULNAR NEUROPATHY OF LEFT UPPER EXTREMITY: ICD-10-CM

## 2021-12-16 DIAGNOSIS — E21.3 HYPERPARATHYROIDISM: ICD-10-CM

## 2021-12-16 PROCEDURE — 4010F ACE/ARB THERAPY RXD/TAKEN: CPT | Mod: HCNC,CPTII,S$GLB, | Performed by: NURSE PRACTITIONER

## 2021-12-16 PROCEDURE — 99999 PR PBB SHADOW E&M-EST. PATIENT-LVL V: CPT | Mod: PBBFAC,HCNC,, | Performed by: NURSE PRACTITIONER

## 2021-12-16 PROCEDURE — 99499 RISK ADDL DX/OHS AUDIT: ICD-10-PCS | Mod: S$GLB,,, | Performed by: NURSE PRACTITIONER

## 2021-12-16 PROCEDURE — 4010F PR ACE/ARB THEARPY RXD/TAKEN: ICD-10-PCS | Mod: HCNC,CPTII,S$GLB, | Performed by: NURSE PRACTITIONER

## 2021-12-16 PROCEDURE — 99215 OFFICE O/P EST HI 40 MIN: CPT | Mod: HCNC,S$GLB,, | Performed by: NURSE PRACTITIONER

## 2021-12-16 PROCEDURE — 99499 UNLISTED E&M SERVICE: CPT | Mod: S$GLB,,, | Performed by: NURSE PRACTITIONER

## 2021-12-16 PROCEDURE — 3066F PR DOCUMENTATION OF TREATMENT FOR NEPHROPATHY: ICD-10-PCS | Mod: HCNC,CPTII,S$GLB, | Performed by: NURSE PRACTITIONER

## 2021-12-16 PROCEDURE — 99215 PR OFFICE/OUTPT VISIT, EST, LEVL V, 40-54 MIN: ICD-10-PCS | Mod: HCNC,S$GLB,, | Performed by: NURSE PRACTITIONER

## 2021-12-16 PROCEDURE — 3066F NEPHROPATHY DOC TX: CPT | Mod: HCNC,CPTII,S$GLB, | Performed by: NURSE PRACTITIONER

## 2021-12-16 PROCEDURE — 99999 PR PBB SHADOW E&M-EST. PATIENT-LVL V: ICD-10-PCS | Mod: PBBFAC,HCNC,, | Performed by: NURSE PRACTITIONER

## 2021-12-16 RX ORDER — CARVEDILOL 3.12 MG/1
3.12 TABLET ORAL 2 TIMES DAILY
Qty: 180 TABLET | Refills: 3 | Status: SHIPPED | OUTPATIENT
Start: 2021-12-16 | End: 2022-01-10 | Stop reason: SDUPTHER

## 2021-12-16 RX ORDER — PANTOPRAZOLE SODIUM 40 MG/1
40 TABLET, DELAYED RELEASE ORAL DAILY
Qty: 90 TABLET | Refills: 1 | Status: SHIPPED | OUTPATIENT
Start: 2021-12-16 | End: 2022-06-20

## 2021-12-17 ENCOUNTER — TELEPHONE (OUTPATIENT)
Dept: PRIMARY CARE CLINIC | Facility: CLINIC | Age: 66
End: 2021-12-17
Payer: MEDICARE

## 2021-12-17 ENCOUNTER — DOCUMENT SCAN (OUTPATIENT)
Dept: HOME HEALTH SERVICES | Facility: HOSPITAL | Age: 66
End: 2021-12-17
Payer: MEDICARE

## 2021-12-17 DIAGNOSIS — A04.72 COLITIS, CLOSTRIDIUM DIFFICILE: Primary | ICD-10-CM

## 2021-12-17 RX ORDER — VANCOMYCIN HYDROCHLORIDE 125 MG/1
125 CAPSULE ORAL 4 TIMES DAILY
Qty: 40 CAPSULE | Refills: 0 | Status: SHIPPED | OUTPATIENT
Start: 2021-12-17 | End: 2021-12-23 | Stop reason: SDUPTHER

## 2021-12-20 ENCOUNTER — OUTPATIENT CASE MANAGEMENT (OUTPATIENT)
Dept: ADMINISTRATIVE | Facility: OTHER | Age: 66
End: 2021-12-20
Payer: MEDICARE

## 2021-12-20 NOTE — TELEPHONE ENCOUNTER
Patient family member in the room and is upset about him being D/C; states she feels like he has PNA and needs to be rechecked Spoke with pt regarding her Potassium level,     She stated that she had eaten a couple of banana's over the weekend, she will repeat labs in am.

## 2021-12-21 ENCOUNTER — DOCUMENT SCAN (OUTPATIENT)
Dept: HOME HEALTH SERVICES | Facility: HOSPITAL | Age: 66
End: 2021-12-21
Payer: MEDICARE

## 2021-12-22 ENCOUNTER — EXTERNAL HOME HEALTH (OUTPATIENT)
Dept: HOME HEALTH SERVICES | Facility: HOSPITAL | Age: 66
End: 2021-12-22
Payer: MEDICARE

## 2021-12-23 ENCOUNTER — OFFICE VISIT (OUTPATIENT)
Dept: PRIMARY CARE CLINIC | Facility: CLINIC | Age: 66
End: 2021-12-23
Payer: MEDICARE

## 2021-12-23 ENCOUNTER — DOCUMENT SCAN (OUTPATIENT)
Dept: HOME HEALTH SERVICES | Facility: HOSPITAL | Age: 66
End: 2021-12-23
Payer: MEDICARE

## 2021-12-23 ENCOUNTER — TELEPHONE (OUTPATIENT)
Dept: PRIMARY CARE CLINIC | Facility: CLINIC | Age: 66
End: 2021-12-23
Payer: MEDICARE

## 2021-12-23 ENCOUNTER — HOSPITAL ENCOUNTER (OUTPATIENT)
Facility: HOSPITAL | Age: 66
Discharge: HOME | End: 2021-12-25
Attending: EMERGENCY MEDICINE | Admitting: STUDENT IN AN ORGANIZED HEALTH CARE EDUCATION/TRAINING PROGRAM
Payer: MEDICARE

## 2021-12-23 VITALS
TEMPERATURE: 100 F | DIASTOLIC BLOOD PRESSURE: 72 MMHG | HEIGHT: 62 IN | SYSTOLIC BLOOD PRESSURE: 118 MMHG | HEART RATE: 113 BPM | BODY MASS INDEX: 31.16 KG/M2 | OXYGEN SATURATION: 95 % | WEIGHT: 169.31 LBS

## 2021-12-23 DIAGNOSIS — R53.1 WEAKNESS: ICD-10-CM

## 2021-12-23 DIAGNOSIS — R50.9 FEVER, UNSPECIFIED FEVER CAUSE: ICD-10-CM

## 2021-12-23 DIAGNOSIS — D05.12 DUCTAL CARCINOMA IN SITU (DCIS) OF LEFT BREAST: ICD-10-CM

## 2021-12-23 DIAGNOSIS — L02.412 ABSCESS OF LEFT AXILLA: ICD-10-CM

## 2021-12-23 DIAGNOSIS — D84.9 IMMUNOCOMPROMISED STATE: Primary | ICD-10-CM

## 2021-12-23 DIAGNOSIS — Z85.3 HISTORY OF BREAST CANCER: ICD-10-CM

## 2021-12-23 DIAGNOSIS — L03.112 CELLULITIS OF LEFT AXILLA: ICD-10-CM

## 2021-12-23 DIAGNOSIS — Z86.19 HISTORY OF CLOSTRIDIOIDES DIFFICILE INFECTION: ICD-10-CM

## 2021-12-23 DIAGNOSIS — R00.0 TACHYCARDIA: ICD-10-CM

## 2021-12-23 DIAGNOSIS — L03.818 CELLULITIS OF OTHER SPECIFIED SITE: ICD-10-CM

## 2021-12-23 DIAGNOSIS — Z94.1 H/O HEART TRANSPLANT: ICD-10-CM

## 2021-12-23 DIAGNOSIS — R50.9 FEVER: ICD-10-CM

## 2021-12-23 DIAGNOSIS — R50.9 FEVER, UNSPECIFIED FEVER CAUSE: Primary | ICD-10-CM

## 2021-12-23 DIAGNOSIS — A04.72 COLITIS, CLOSTRIDIUM DIFFICILE: ICD-10-CM

## 2021-12-23 DIAGNOSIS — D84.9 IMMUNOCOMPROMISED PATIENT: ICD-10-CM

## 2021-12-23 LAB
BNP SERPL-MCNC: 190 PG/ML (ref 0–99)
LACTATE SERPL-SCNC: 0.8 MMOL/L (ref 0.5–2.2)
PROCALCITONIN SERPL IA-MCNC: 0.38 NG/ML

## 2021-12-23 PROCEDURE — 4010F PR ACE/ARB THEARPY RXD/TAKEN: ICD-10-PCS | Mod: HCNC,CPTII,S$GLB, | Performed by: NURSE PRACTITIONER

## 2021-12-23 PROCEDURE — 63600175 PHARM REV CODE 636 W HCPCS: Mod: HCNC | Performed by: NURSE PRACTITIONER

## 2021-12-23 PROCEDURE — G0378 HOSPITAL OBSERVATION PER HR: HCPCS | Mod: HCNC

## 2021-12-23 PROCEDURE — 3074F PR MOST RECENT SYSTOLIC BLOOD PRESSURE < 130 MM HG: ICD-10-PCS | Mod: HCNC,CPTII,S$GLB, | Performed by: NURSE PRACTITIONER

## 2021-12-23 PROCEDURE — 96361 HYDRATE IV INFUSION ADD-ON: CPT | Mod: HCNC

## 2021-12-23 PROCEDURE — 93010 ELECTROCARDIOGRAM REPORT: CPT | Mod: HCNC,,, | Performed by: INTERNAL MEDICINE

## 2021-12-23 PROCEDURE — 93010 EKG 12-LEAD: ICD-10-PCS | Mod: HCNC,,, | Performed by: INTERNAL MEDICINE

## 2021-12-23 PROCEDURE — 1125F AMNT PAIN NOTED PAIN PRSNT: CPT | Mod: HCNC,CPTII,S$GLB, | Performed by: NURSE PRACTITIONER

## 2021-12-23 PROCEDURE — 3288F PR FALLS RISK ASSESSMENT DOCUMENTED: ICD-10-PCS | Mod: HCNC,CPTII,S$GLB, | Performed by: NURSE PRACTITIONER

## 2021-12-23 PROCEDURE — 84145 PROCALCITONIN (PCT): CPT | Mod: HCNC | Performed by: EMERGENCY MEDICINE

## 2021-12-23 PROCEDURE — 1125F PR PAIN SEVERITY QUANTIFIED, PAIN PRESENT: ICD-10-PCS | Mod: HCNC,CPTII,S$GLB, | Performed by: NURSE PRACTITIONER

## 2021-12-23 PROCEDURE — 4010F ACE/ARB THERAPY RXD/TAKEN: CPT | Mod: HCNC,CPTII,S$GLB, | Performed by: NURSE PRACTITIONER

## 2021-12-23 PROCEDURE — 3288F FALL RISK ASSESSMENT DOCD: CPT | Mod: HCNC,CPTII,S$GLB, | Performed by: NURSE PRACTITIONER

## 2021-12-23 PROCEDURE — 3044F HG A1C LEVEL LT 7.0%: CPT | Mod: HCNC,CPTII,S$GLB, | Performed by: NURSE PRACTITIONER

## 2021-12-23 PROCEDURE — 99999 PR PBB SHADOW E&M-EST. PATIENT-LVL V: CPT | Mod: PBBFAC,HCNC,, | Performed by: NURSE PRACTITIONER

## 2021-12-23 PROCEDURE — 87040 BLOOD CULTURE FOR BACTERIA: CPT | Mod: HCNC | Performed by: EMERGENCY MEDICINE

## 2021-12-23 PROCEDURE — 25000003 PHARM REV CODE 250: Mod: HCNC | Performed by: NURSE PRACTITIONER

## 2021-12-23 PROCEDURE — 3074F SYST BP LT 130 MM HG: CPT | Mod: HCNC,CPTII,S$GLB, | Performed by: NURSE PRACTITIONER

## 2021-12-23 PROCEDURE — 93005 ELECTROCARDIOGRAM TRACING: CPT | Mod: HCNC

## 2021-12-23 PROCEDURE — 3066F NEPHROPATHY DOC TX: CPT | Mod: HCNC,CPTII,S$GLB, | Performed by: NURSE PRACTITIONER

## 2021-12-23 PROCEDURE — 3078F DIAST BP <80 MM HG: CPT | Mod: HCNC,CPTII,S$GLB, | Performed by: NURSE PRACTITIONER

## 2021-12-23 PROCEDURE — 1101F PT FALLS ASSESS-DOCD LE1/YR: CPT | Mod: HCNC,CPTII,S$GLB, | Performed by: NURSE PRACTITIONER

## 2021-12-23 PROCEDURE — 96375 TX/PRO/DX INJ NEW DRUG ADDON: CPT | Mod: 59

## 2021-12-23 PROCEDURE — 83880 ASSAY OF NATRIURETIC PEPTIDE: CPT | Mod: HCNC | Performed by: EMERGENCY MEDICINE

## 2021-12-23 PROCEDURE — 1101F PR PT FALLS ASSESS DOC 0-1 FALLS W/OUT INJ PAST YR: ICD-10-PCS | Mod: HCNC,CPTII,S$GLB, | Performed by: NURSE PRACTITIONER

## 2021-12-23 PROCEDURE — 99999 PR PBB SHADOW E&M-EST. PATIENT-LVL V: ICD-10-PCS | Mod: PBBFAC,HCNC,, | Performed by: NURSE PRACTITIONER

## 2021-12-23 PROCEDURE — 1159F PR MEDICATION LIST DOCUMENTED IN MEDICAL RECORD: ICD-10-PCS | Mod: HCNC,CPTII,S$GLB, | Performed by: NURSE PRACTITIONER

## 2021-12-23 PROCEDURE — 3066F PR DOCUMENTATION OF TREATMENT FOR NEPHROPATHY: ICD-10-PCS | Mod: HCNC,CPTII,S$GLB, | Performed by: NURSE PRACTITIONER

## 2021-12-23 PROCEDURE — 25000003 PHARM REV CODE 250: Mod: HCNC | Performed by: EMERGENCY MEDICINE

## 2021-12-23 PROCEDURE — 3044F PR MOST RECENT HEMOGLOBIN A1C LEVEL <7.0%: ICD-10-PCS | Mod: HCNC,CPTII,S$GLB, | Performed by: NURSE PRACTITIONER

## 2021-12-23 PROCEDURE — 1111F DSCHRG MED/CURRENT MED MERGE: CPT | Mod: HCNC,CPTII,S$GLB, | Performed by: NURSE PRACTITIONER

## 2021-12-23 PROCEDURE — 3008F PR BODY MASS INDEX (BMI) DOCUMENTED: ICD-10-PCS | Mod: HCNC,CPTII,S$GLB, | Performed by: NURSE PRACTITIONER

## 2021-12-23 PROCEDURE — 99285 EMERGENCY DEPT VISIT HI MDM: CPT | Mod: 25,HCNC

## 2021-12-23 PROCEDURE — 83605 ASSAY OF LACTIC ACID: CPT | Mod: HCNC | Performed by: EMERGENCY MEDICINE

## 2021-12-23 PROCEDURE — 3008F BODY MASS INDEX DOCD: CPT | Mod: HCNC,CPTII,S$GLB, | Performed by: NURSE PRACTITIONER

## 2021-12-23 PROCEDURE — 1159F MED LIST DOCD IN RCRD: CPT | Mod: HCNC,CPTII,S$GLB, | Performed by: NURSE PRACTITIONER

## 2021-12-23 PROCEDURE — 3078F PR MOST RECENT DIASTOLIC BLOOD PRESSURE < 80 MM HG: ICD-10-PCS | Mod: HCNC,CPTII,S$GLB, | Performed by: NURSE PRACTITIONER

## 2021-12-23 PROCEDURE — 99215 PR OFFICE/OUTPT VISIT, EST, LEVL V, 40-54 MIN: ICD-10-PCS | Mod: HCNC,S$GLB,, | Performed by: NURSE PRACTITIONER

## 2021-12-23 PROCEDURE — 99215 OFFICE O/P EST HI 40 MIN: CPT | Mod: HCNC,S$GLB,, | Performed by: NURSE PRACTITIONER

## 2021-12-23 PROCEDURE — 1111F PR DISCHARGE MEDS RECONCILED W/ CURRENT OUTPATIENT MED LIST: ICD-10-PCS | Mod: HCNC,CPTII,S$GLB, | Performed by: NURSE PRACTITIONER

## 2021-12-23 RX ORDER — PANTOPRAZOLE SODIUM 40 MG/1
40 TABLET, DELAYED RELEASE ORAL DAILY
Status: DISCONTINUED | OUTPATIENT
Start: 2021-12-24 | End: 2021-12-25 | Stop reason: HOSPADM

## 2021-12-23 RX ORDER — HYDRALAZINE HYDROCHLORIDE 50 MG/1
100 TABLET, FILM COATED ORAL EVERY 8 HOURS
Status: DISCONTINUED | OUTPATIENT
Start: 2021-12-24 | End: 2021-12-25 | Stop reason: HOSPADM

## 2021-12-23 RX ORDER — LINEZOLID 600 MG/1
600 TABLET, FILM COATED ORAL EVERY 12 HOURS
Qty: 20 TABLET | Refills: 0 | Status: SHIPPED | OUTPATIENT
Start: 2021-12-23 | End: 2021-12-23

## 2021-12-23 RX ORDER — FUROSEMIDE 20 MG/1
20 TABLET ORAL DAILY
Status: DISCONTINUED | OUTPATIENT
Start: 2021-12-24 | End: 2021-12-25 | Stop reason: HOSPADM

## 2021-12-23 RX ORDER — HYDRALAZINE HYDROCHLORIDE 20 MG/ML
10 INJECTION INTRAMUSCULAR; INTRAVENOUS EVERY 6 HOURS PRN
Status: DISCONTINUED | OUTPATIENT
Start: 2021-12-23 | End: 2021-12-25 | Stop reason: HOSPADM

## 2021-12-23 RX ORDER — VANCOMYCIN HYDROCHLORIDE 125 MG/1
125 CAPSULE ORAL 4 TIMES DAILY
Qty: 80 CAPSULE | Refills: 0 | Status: SHIPPED | OUTPATIENT
Start: 2021-12-23 | End: 2022-01-10

## 2021-12-23 RX ORDER — LOSARTAN POTASSIUM 25 MG/1
25 TABLET ORAL DAILY
Status: DISCONTINUED | OUTPATIENT
Start: 2021-12-24 | End: 2021-12-25 | Stop reason: HOSPADM

## 2021-12-23 RX ORDER — ASPIRIN 81 MG/1
81 TABLET ORAL DAILY
Status: DISCONTINUED | OUTPATIENT
Start: 2021-12-24 | End: 2021-12-25 | Stop reason: HOSPADM

## 2021-12-23 RX ORDER — LANOLIN ALCOHOL/MO/W.PET/CERES
1 CREAM (GRAM) TOPICAL DAILY
Status: DISCONTINUED | OUTPATIENT
Start: 2021-12-24 | End: 2021-12-25 | Stop reason: HOSPADM

## 2021-12-23 RX ORDER — PREGABALIN 25 MG/1
50 CAPSULE ORAL 2 TIMES DAILY
Status: DISCONTINUED | OUTPATIENT
Start: 2021-12-23 | End: 2021-12-25 | Stop reason: HOSPADM

## 2021-12-23 RX ORDER — CARVEDILOL 3.12 MG/1
3.12 TABLET ORAL 2 TIMES DAILY
Status: DISCONTINUED | OUTPATIENT
Start: 2021-12-23 | End: 2021-12-25 | Stop reason: HOSPADM

## 2021-12-23 RX ORDER — CLONIDINE HYDROCHLORIDE 0.1 MG/1
0.1 TABLET ORAL NIGHTLY
Status: DISCONTINUED | OUTPATIENT
Start: 2021-12-23 | End: 2021-12-25 | Stop reason: HOSPADM

## 2021-12-23 RX ORDER — ACETAMINOPHEN 325 MG/1
650 TABLET ORAL EVERY 6 HOURS PRN
Status: DISCONTINUED | OUTPATIENT
Start: 2021-12-23 | End: 2021-12-25 | Stop reason: HOSPADM

## 2021-12-23 RX ORDER — BACLOFEN 10 MG/1
10 TABLET ORAL EVERY MORNING
Status: DISCONTINUED | OUTPATIENT
Start: 2021-12-24 | End: 2021-12-25 | Stop reason: HOSPADM

## 2021-12-23 RX ORDER — BUSPIRONE HYDROCHLORIDE 10 MG/1
10 TABLET ORAL DAILY
Status: DISCONTINUED | OUTPATIENT
Start: 2021-12-24 | End: 2021-12-25 | Stop reason: HOSPADM

## 2021-12-23 RX ORDER — LINEZOLID 600 MG/1
600 TABLET, FILM COATED ORAL EVERY 12 HOURS
Qty: 20 TABLET | Refills: 0 | Status: ON HOLD | OUTPATIENT
Start: 2021-12-23 | End: 2021-12-25 | Stop reason: HOSPADM

## 2021-12-23 RX ORDER — DULOXETIN HYDROCHLORIDE 30 MG/1
30 CAPSULE, DELAYED RELEASE ORAL DAILY
Status: DISCONTINUED | OUTPATIENT
Start: 2021-12-24 | End: 2021-12-25 | Stop reason: HOSPADM

## 2021-12-23 RX ORDER — AMLODIPINE BESYLATE 2.5 MG/1
2.5 TABLET ORAL DAILY
Status: DISCONTINUED | OUTPATIENT
Start: 2021-12-24 | End: 2021-12-25 | Stop reason: HOSPADM

## 2021-12-23 RX ORDER — ONDANSETRON 2 MG/ML
4 INJECTION INTRAMUSCULAR; INTRAVENOUS EVERY 6 HOURS PRN
Status: DISCONTINUED | OUTPATIENT
Start: 2021-12-23 | End: 2021-12-25 | Stop reason: HOSPADM

## 2021-12-23 RX ORDER — HYDROCODONE BITARTRATE AND ACETAMINOPHEN 10; 325 MG/1; MG/1
1 TABLET ORAL EVERY 6 HOURS PRN
Status: DISCONTINUED | OUTPATIENT
Start: 2021-12-23 | End: 2021-12-25 | Stop reason: HOSPADM

## 2021-12-23 RX ORDER — DOXYCYCLINE 100 MG/1
100 CAPSULE ORAL EVERY 12 HOURS
Qty: 14 CAPSULE | Refills: 0 | Status: SHIPPED | OUTPATIENT
Start: 2021-12-23 | End: 2021-12-23

## 2021-12-23 RX ADMIN — ONDANSETRON 4 MG: 2 INJECTION INTRAMUSCULAR; INTRAVENOUS at 10:12

## 2021-12-23 RX ADMIN — SODIUM CHLORIDE 500 ML: 0.9 INJECTION, SOLUTION INTRAVENOUS at 05:12

## 2021-12-23 RX ADMIN — PREGABALIN 50 MG: 25 CAPSULE ORAL at 10:12

## 2021-12-23 RX ADMIN — CLONIDINE HYDROCHLORIDE 0.1 MG: 0.1 TABLET ORAL at 10:12

## 2021-12-23 RX ADMIN — CARVEDILOL 3.12 MG: 3.12 TABLET, FILM COATED ORAL at 10:12

## 2021-12-23 RX ADMIN — HYDROCODONE BITARTRATE AND ACETAMINOPHEN 1 TABLET: 10; 325 TABLET ORAL at 10:12

## 2021-12-24 ENCOUNTER — ANESTHESIA (OUTPATIENT)
Dept: SURGERY | Facility: HOSPITAL | Age: 66
End: 2021-12-24
Payer: MEDICARE

## 2021-12-24 ENCOUNTER — ANESTHESIA EVENT (OUTPATIENT)
Dept: SURGERY | Facility: HOSPITAL | Age: 66
End: 2021-12-24
Payer: MEDICARE

## 2021-12-24 LAB
ALBUMIN SERPL BCP-MCNC: 2.6 G/DL (ref 3.5–5.2)
ALP SERPL-CCNC: 102 U/L (ref 55–135)
ALT SERPL W/O P-5'-P-CCNC: 28 U/L (ref 10–44)
ANION GAP SERPL CALC-SCNC: 10 MMOL/L (ref 8–16)
AST SERPL-CCNC: 34 U/L (ref 10–40)
BASOPHILS # BLD AUTO: 0.02 K/UL (ref 0–0.2)
BASOPHILS NFR BLD: 0.3 % (ref 0–1.9)
BILIRUB SERPL-MCNC: 0.5 MG/DL (ref 0.1–1)
BUN SERPL-MCNC: 29 MG/DL (ref 8–23)
CALCIUM SERPL-MCNC: 8.4 MG/DL (ref 8.7–10.5)
CHLORIDE SERPL-SCNC: 107 MMOL/L (ref 95–110)
CHOLEST SERPL-MCNC: 157 MG/DL (ref 120–199)
CHOLEST/HDLC SERPL: 3.7 {RATIO} (ref 2–5)
CO2 SERPL-SCNC: 24 MMOL/L (ref 23–29)
CREAT SERPL-MCNC: 2.3 MG/DL (ref 0.5–1.4)
DIFFERENTIAL METHOD: ABNORMAL
EOSINOPHIL # BLD AUTO: 0 K/UL (ref 0–0.5)
EOSINOPHIL NFR BLD: 0.6 % (ref 0–8)
ERYTHROCYTE [DISTWIDTH] IN BLOOD BY AUTOMATED COUNT: 14.7 % (ref 11.5–14.5)
EST. GFR  (AFRICAN AMERICAN): 25 ML/MIN/1.73 M^2
EST. GFR  (NON AFRICAN AMERICAN): 21 ML/MIN/1.73 M^2
ESTIMATED AVG GLUCOSE: 103 MG/DL (ref 68–131)
GLUCOSE SERPL-MCNC: 82 MG/DL (ref 70–110)
HBA1C MFR BLD: 5.2 % (ref 4–5.6)
HCT VFR BLD AUTO: 24.8 % (ref 37–48.5)
HDLC SERPL-MCNC: 42 MG/DL (ref 40–75)
HDLC SERPL: 26.8 % (ref 20–50)
HGB BLD-MCNC: 7.8 G/DL (ref 12–16)
IMM GRANULOCYTES # BLD AUTO: 0.04 K/UL (ref 0–0.04)
IMM GRANULOCYTES NFR BLD AUTO: 0.6 % (ref 0–0.5)
LDLC SERPL CALC-MCNC: 91 MG/DL (ref 63–159)
LYMPHOCYTES # BLD AUTO: 0.9 K/UL (ref 1–4.8)
LYMPHOCYTES NFR BLD: 13.6 % (ref 18–48)
MAGNESIUM SERPL-MCNC: 1.5 MG/DL (ref 1.6–2.6)
MCH RBC QN AUTO: 28.9 PG (ref 27–31)
MCHC RBC AUTO-ENTMCNC: 31.5 G/DL (ref 32–36)
MCV RBC AUTO: 92 FL (ref 82–98)
MONOCYTES # BLD AUTO: 0.9 K/UL (ref 0.3–1)
MONOCYTES NFR BLD: 13.5 % (ref 4–15)
NEUTROPHILS # BLD AUTO: 4.7 K/UL (ref 1.8–7.7)
NEUTROPHILS NFR BLD: 71.4 % (ref 38–73)
NONHDLC SERPL-MCNC: 115 MG/DL
NRBC BLD-RTO: 0 /100 WBC
PLATELET # BLD AUTO: 188 K/UL (ref 150–450)
PMV BLD AUTO: 9.7 FL (ref 9.2–12.9)
POTASSIUM SERPL-SCNC: 3.8 MMOL/L (ref 3.5–5.1)
PROT SERPL-MCNC: 6.3 G/DL (ref 6–8.4)
RBC # BLD AUTO: 2.7 M/UL (ref 4–5.4)
SODIUM SERPL-SCNC: 141 MMOL/L (ref 136–145)
T4 FREE SERPL-MCNC: 0.9 NG/DL (ref 0.71–1.51)
TRIGL SERPL-MCNC: 120 MG/DL (ref 30–150)
TSH SERPL DL<=0.005 MIU/L-ACNC: 5.16 UIU/ML (ref 0.4–4)
VANCOMYCIN SERPL-MCNC: 20.6 UG/ML
WBC # BLD AUTO: 6.53 K/UL (ref 3.9–12.7)

## 2021-12-24 PROCEDURE — 80053 COMPREHEN METABOLIC PANEL: CPT | Mod: HCNC | Performed by: NURSE PRACTITIONER

## 2021-12-24 PROCEDURE — 96376 TX/PRO/DX INJ SAME DRUG ADON: CPT

## 2021-12-24 PROCEDURE — 36000704 HC OR TIME LEV I 1ST 15 MIN: Mod: HCNC | Performed by: SURGERY

## 2021-12-24 PROCEDURE — 83036 HEMOGLOBIN GLYCOSYLATED A1C: CPT | Mod: HCNC | Performed by: NURSE PRACTITIONER

## 2021-12-24 PROCEDURE — 36000705 HC OR TIME LEV I EA ADD 15 MIN: Mod: HCNC | Performed by: SURGERY

## 2021-12-24 PROCEDURE — 63600175 PHARM REV CODE 636 W HCPCS: Mod: HCNC | Performed by: STUDENT IN AN ORGANIZED HEALTH CARE EDUCATION/TRAINING PROGRAM

## 2021-12-24 PROCEDURE — 99214 OFFICE O/P EST MOD 30 MIN: CPT | Mod: HCNC,,, | Performed by: INTERNAL MEDICINE

## 2021-12-24 PROCEDURE — 80061 LIPID PANEL: CPT | Mod: HCNC | Performed by: NURSE PRACTITIONER

## 2021-12-24 PROCEDURE — 96375 TX/PRO/DX INJ NEW DRUG ADDON: CPT | Mod: 59

## 2021-12-24 PROCEDURE — 25000003 PHARM REV CODE 250: Mod: HCNC | Performed by: STUDENT IN AN ORGANIZED HEALTH CARE EDUCATION/TRAINING PROGRAM

## 2021-12-24 PROCEDURE — 10061 I&D ABSCESS COMP/MULTIPLE: CPT | Mod: 78,HCNC,, | Performed by: SURGERY

## 2021-12-24 PROCEDURE — 37000009 HC ANESTHESIA EA ADD 15 MINS: Mod: HCNC | Performed by: SURGERY

## 2021-12-24 PROCEDURE — 96365 THER/PROPH/DIAG IV INF INIT: CPT | Mod: 59

## 2021-12-24 PROCEDURE — 27000221 HC OXYGEN, UP TO 24 HOURS: Mod: HCNC

## 2021-12-24 PROCEDURE — 84443 ASSAY THYROID STIM HORMONE: CPT | Mod: HCNC | Performed by: NURSE PRACTITIONER

## 2021-12-24 PROCEDURE — 83735 ASSAY OF MAGNESIUM: CPT | Mod: HCNC | Performed by: NURSE PRACTITIONER

## 2021-12-24 PROCEDURE — 80202 ASSAY OF VANCOMYCIN: CPT | Mod: HCNC | Performed by: STUDENT IN AN ORGANIZED HEALTH CARE EDUCATION/TRAINING PROGRAM

## 2021-12-24 PROCEDURE — 71000033 HC RECOVERY, INTIAL HOUR: Mod: HCNC | Performed by: SURGERY

## 2021-12-24 PROCEDURE — G0378 HOSPITAL OBSERVATION PER HR: HCPCS | Mod: HCNC

## 2021-12-24 PROCEDURE — 36415 COLL VENOUS BLD VENIPUNCTURE: CPT | Mod: HCNC | Performed by: NURSE PRACTITIONER

## 2021-12-24 PROCEDURE — 87070 CULTURE OTHR SPECIMN AEROBIC: CPT | Mod: HCNC | Performed by: SURGERY

## 2021-12-24 PROCEDURE — 63600175 PHARM REV CODE 636 W HCPCS: Mod: HCNC | Performed by: NURSE PRACTITIONER

## 2021-12-24 PROCEDURE — 37000008 HC ANESTHESIA 1ST 15 MINUTES: Mod: HCNC | Performed by: SURGERY

## 2021-12-24 PROCEDURE — 85025 COMPLETE CBC W/AUTO DIFF WBC: CPT | Mod: HCNC | Performed by: NURSE PRACTITIONER

## 2021-12-24 PROCEDURE — 25000003 PHARM REV CODE 250: Mod: HCNC | Performed by: SURGERY

## 2021-12-24 PROCEDURE — 63600175 PHARM REV CODE 636 W HCPCS: Mod: HCNC

## 2021-12-24 PROCEDURE — 36415 COLL VENOUS BLD VENIPUNCTURE: CPT | Mod: HCNC | Performed by: STUDENT IN AN ORGANIZED HEALTH CARE EDUCATION/TRAINING PROGRAM

## 2021-12-24 PROCEDURE — 25000003 PHARM REV CODE 250: Mod: HCNC | Performed by: INTERNAL MEDICINE

## 2021-12-24 PROCEDURE — 63600175 PHARM REV CODE 636 W HCPCS: Mod: HCNC | Performed by: SURGERY

## 2021-12-24 PROCEDURE — 25000003 PHARM REV CODE 250: Mod: HCNC | Performed by: NURSE PRACTITIONER

## 2021-12-24 PROCEDURE — 63600175 PHARM REV CODE 636 W HCPCS: Mod: HCNC | Performed by: INTERNAL MEDICINE

## 2021-12-24 PROCEDURE — 25000003 PHARM REV CODE 250: Mod: HCNC

## 2021-12-24 PROCEDURE — 84439 ASSAY OF FREE THYROXINE: CPT | Mod: HCNC | Performed by: NURSE PRACTITIONER

## 2021-12-24 PROCEDURE — 99214 PR OFFICE/OUTPT VISIT, EST, LEVL IV, 30-39 MIN: ICD-10-PCS | Mod: HCNC,,, | Performed by: INTERNAL MEDICINE

## 2021-12-24 PROCEDURE — 10061 PR DRAIN SKIN ABSCESS COMPLIC: ICD-10-PCS | Mod: 78,HCNC,, | Performed by: SURGERY

## 2021-12-24 RX ORDER — ROCURONIUM BROMIDE 10 MG/ML
INJECTION, SOLUTION INTRAVENOUS
Status: DISCONTINUED | OUTPATIENT
Start: 2021-12-24 | End: 2021-12-24

## 2021-12-24 RX ORDER — DIPHENHYDRAMINE HCL 25 MG
25 CAPSULE ORAL EVERY 6 HOURS PRN
Status: DISCONTINUED | OUTPATIENT
Start: 2021-12-24 | End: 2021-12-25 | Stop reason: HOSPADM

## 2021-12-24 RX ORDER — SODIUM CHLORIDE, SODIUM LACTATE, POTASSIUM CHLORIDE, CALCIUM CHLORIDE 600; 310; 30; 20 MG/100ML; MG/100ML; MG/100ML; MG/100ML
INJECTION, SOLUTION INTRAVENOUS CONTINUOUS
Status: DISCONTINUED | OUTPATIENT
Start: 2021-12-24 | End: 2021-12-25 | Stop reason: HOSPADM

## 2021-12-24 RX ORDER — BUPIVACAINE HYDROCHLORIDE 2.5 MG/ML
INJECTION, SOLUTION EPIDURAL; INFILTRATION; INTRACAUDAL
Status: DISCONTINUED | OUTPATIENT
Start: 2021-12-24 | End: 2021-12-24 | Stop reason: HOSPADM

## 2021-12-24 RX ORDER — CHLORHEXIDINE GLUCONATE ORAL RINSE 1.2 MG/ML
10 SOLUTION DENTAL 2 TIMES DAILY
Status: DISCONTINUED | OUTPATIENT
Start: 2021-12-24 | End: 2021-12-25 | Stop reason: HOSPADM

## 2021-12-24 RX ORDER — HYDROMORPHONE HYDROCHLORIDE 2 MG/ML
0.2 INJECTION, SOLUTION INTRAMUSCULAR; INTRAVENOUS; SUBCUTANEOUS EVERY 5 MIN PRN
Status: DISCONTINUED | OUTPATIENT
Start: 2021-12-24 | End: 2021-12-25 | Stop reason: HOSPADM

## 2021-12-24 RX ORDER — ONDANSETRON 2 MG/ML
4 INJECTION INTRAMUSCULAR; INTRAVENOUS DAILY PRN
Status: DISCONTINUED | OUTPATIENT
Start: 2021-12-24 | End: 2021-12-25 | Stop reason: HOSPADM

## 2021-12-24 RX ORDER — ONDANSETRON 2 MG/ML
INJECTION INTRAMUSCULAR; INTRAVENOUS
Status: DISCONTINUED | OUTPATIENT
Start: 2021-12-24 | End: 2021-12-24

## 2021-12-24 RX ORDER — LIDOCAINE HYDROCHLORIDE 10 MG/ML
INJECTION, SOLUTION EPIDURAL; INFILTRATION; INTRACAUDAL; PERINEURAL
Status: DISCONTINUED | OUTPATIENT
Start: 2021-12-24 | End: 2021-12-24

## 2021-12-24 RX ORDER — OXYCODONE AND ACETAMINOPHEN 5; 325 MG/1; MG/1
1 TABLET ORAL
Status: DISCONTINUED | OUTPATIENT
Start: 2021-12-24 | End: 2021-12-25 | Stop reason: HOSPADM

## 2021-12-24 RX ORDER — PROPOFOL 10 MG/ML
VIAL (ML) INTRAVENOUS
Status: DISCONTINUED | OUTPATIENT
Start: 2021-12-24 | End: 2021-12-24

## 2021-12-24 RX ORDER — SODIUM CHLORIDE, SODIUM LACTATE, POTASSIUM CHLORIDE, CALCIUM CHLORIDE 600; 310; 30; 20 MG/100ML; MG/100ML; MG/100ML; MG/100ML
INJECTION, SOLUTION INTRAVENOUS CONTINUOUS PRN
Status: DISCONTINUED | OUTPATIENT
Start: 2021-12-24 | End: 2021-12-24

## 2021-12-24 RX ORDER — SUCCINYLCHOLINE CHLORIDE 20 MG/ML
INJECTION INTRAMUSCULAR; INTRAVENOUS
Status: DISCONTINUED | OUTPATIENT
Start: 2021-12-24 | End: 2021-12-24

## 2021-12-24 RX ORDER — ZOLPIDEM TARTRATE 5 MG/1
10 TABLET ORAL NIGHTLY
Status: DISCONTINUED | OUTPATIENT
Start: 2021-12-24 | End: 2021-12-25 | Stop reason: HOSPADM

## 2021-12-24 RX ORDER — CEFEPIME HYDROCHLORIDE 1 G/50ML
1 INJECTION, SOLUTION INTRAVENOUS
Status: DISCONTINUED | OUTPATIENT
Start: 2021-12-24 | End: 2021-12-25 | Stop reason: HOSPADM

## 2021-12-24 RX ORDER — DEXAMETHASONE SODIUM PHOSPHATE 4 MG/ML
INJECTION, SOLUTION INTRA-ARTICULAR; INTRALESIONAL; INTRAMUSCULAR; INTRAVENOUS; SOFT TISSUE
Status: DISCONTINUED | OUTPATIENT
Start: 2021-12-24 | End: 2021-12-24

## 2021-12-24 RX ORDER — FENTANYL CITRATE 50 UG/ML
INJECTION, SOLUTION INTRAMUSCULAR; INTRAVENOUS
Status: DISCONTINUED | OUTPATIENT
Start: 2021-12-24 | End: 2021-12-24

## 2021-12-24 RX ORDER — OXYCODONE HYDROCHLORIDE 5 MG/1
10 TABLET ORAL EVERY 4 HOURS PRN
Status: DISCONTINUED | OUTPATIENT
Start: 2021-12-24 | End: 2021-12-25 | Stop reason: HOSPADM

## 2021-12-24 RX ADMIN — CHLORHEXIDINE GLUCONATE 0.12% ORAL RINSE 10 ML: 1.2 LIQUID ORAL at 09:12

## 2021-12-24 RX ADMIN — VANCOMYCIN HYDROCHLORIDE 125 MG: 10 INJECTION, POWDER, LYOPHILIZED, FOR SOLUTION INTRAVENOUS at 12:12

## 2021-12-24 RX ADMIN — LIDOCAINE HYDROCHLORIDE 80 MG: 10 INJECTION, SOLUTION EPIDURAL; INFILTRATION; INTRACAUDAL; PERINEURAL at 11:12

## 2021-12-24 RX ADMIN — BUSPIRONE HYDROCHLORIDE 10 MG: 10 TABLET ORAL at 08:12

## 2021-12-24 RX ADMIN — PREGABALIN 50 MG: 25 CAPSULE ORAL at 08:12

## 2021-12-24 RX ADMIN — AMLODIPINE BESYLATE 2.5 MG: 2.5 TABLET ORAL at 08:12

## 2021-12-24 RX ADMIN — ONDANSETRON 4 MG: 2 INJECTION INTRAMUSCULAR; INTRAVENOUS at 08:12

## 2021-12-24 RX ADMIN — VANCOMYCIN HYDROCHLORIDE 125 MG: 10 INJECTION, POWDER, LYOPHILIZED, FOR SOLUTION INTRAVENOUS at 10:12

## 2021-12-24 RX ADMIN — ASPIRIN 81 MG: 81 TABLET, COATED ORAL at 08:12

## 2021-12-24 RX ADMIN — DULOXETINE 30 MG: 30 CAPSULE, DELAYED RELEASE ORAL at 08:12

## 2021-12-24 RX ADMIN — HYDRALAZINE HYDROCHLORIDE 100 MG: 50 TABLET ORAL at 09:12

## 2021-12-24 RX ADMIN — FUROSEMIDE 20 MG: 20 TABLET ORAL at 08:12

## 2021-12-24 RX ADMIN — ZOLPIDEM TARTRATE 10 MG: 5 TABLET, COATED ORAL at 09:12

## 2021-12-24 RX ADMIN — CARVEDILOL 3.12 MG: 3.12 TABLET, FILM COATED ORAL at 09:12

## 2021-12-24 RX ADMIN — THERA TABS 1 TABLET: TAB at 08:12

## 2021-12-24 RX ADMIN — SODIUM CHLORIDE, SODIUM LACTATE, POTASSIUM CHLORIDE, AND CALCIUM CHLORIDE: .6; .31; .03; .02 INJECTION, SOLUTION INTRAVENOUS at 01:12

## 2021-12-24 RX ADMIN — PANTOPRAZOLE SODIUM 40 MG: 40 TABLET, DELAYED RELEASE ORAL at 08:12

## 2021-12-24 RX ADMIN — SODIUM CHLORIDE, SODIUM LACTATE, POTASSIUM CHLORIDE, AND CALCIUM CHLORIDE: .6; .31; .03; .02 INJECTION, SOLUTION INTRAVENOUS at 11:12

## 2021-12-24 RX ADMIN — VANCOMYCIN HYDROCHLORIDE 1500 MG: 1.5 INJECTION, POWDER, LYOPHILIZED, FOR SOLUTION INTRAVENOUS at 12:12

## 2021-12-24 RX ADMIN — CEFEPIME HYDROCHLORIDE 1 G: 1 INJECTION, SOLUTION INTRAVENOUS at 10:12

## 2021-12-24 RX ADMIN — HYDRALAZINE HYDROCHLORIDE 100 MG: 50 TABLET ORAL at 03:12

## 2021-12-24 RX ADMIN — VANCOMYCIN HYDROCHLORIDE 125 MG: 10 INJECTION, POWDER, LYOPHILIZED, FOR SOLUTION INTRAVENOUS at 05:12

## 2021-12-24 RX ADMIN — PROPOFOL 100 MG: 10 INJECTION, EMULSION INTRAVENOUS at 11:12

## 2021-12-24 RX ADMIN — LOSARTAN POTASSIUM 25 MG: 25 TABLET, FILM COATED ORAL at 08:12

## 2021-12-24 RX ADMIN — DIPHENHYDRAMINE HYDROCHLORIDE 25 MG: 25 CAPSULE ORAL at 09:12

## 2021-12-24 RX ADMIN — PREGABALIN 50 MG: 25 CAPSULE ORAL at 09:12

## 2021-12-24 RX ADMIN — ONDANSETRON 4 MG: 2 INJECTION, SOLUTION INTRAMUSCULAR; INTRAVENOUS at 12:12

## 2021-12-24 RX ADMIN — CARVEDILOL 3.12 MG: 3.12 TABLET, FILM COATED ORAL at 08:12

## 2021-12-24 RX ADMIN — HYDROCODONE BITARTRATE AND ACETAMINOPHEN 1 TABLET: 10; 325 TABLET ORAL at 08:12

## 2021-12-24 RX ADMIN — FENTANYL CITRATE 50 MCG: 50 INJECTION, SOLUTION INTRAMUSCULAR; INTRAVENOUS at 12:12

## 2021-12-24 RX ADMIN — ROCURONIUM BROMIDE 5 MG: 10 INJECTION, SOLUTION INTRAVENOUS at 11:12

## 2021-12-24 RX ADMIN — BACLOFEN 10 MG: 10 TABLET ORAL at 08:12

## 2021-12-24 RX ADMIN — SUCCINYLCHOLINE CHLORIDE 120 MG: 20 INJECTION, SOLUTION INTRAMUSCULAR; INTRAVENOUS at 11:12

## 2021-12-24 RX ADMIN — DEXAMETHASONE SODIUM PHOSPHATE 4 MG: 4 INJECTION, SOLUTION INTRAMUSCULAR; INTRAVENOUS at 12:12

## 2021-12-24 RX ADMIN — CLONIDINE HYDROCHLORIDE 0.1 MG: 0.1 TABLET ORAL at 09:12

## 2021-12-24 RX ADMIN — OXYCODONE HYDROCHLORIDE 10 MG: 5 TABLET ORAL at 05:12

## 2021-12-24 RX ADMIN — FENTANYL CITRATE 50 MCG: 50 INJECTION, SOLUTION INTRAMUSCULAR; INTRAVENOUS at 11:12

## 2021-12-24 RX ADMIN — FERROUS SULFATE TAB 325 MG (65 MG ELEMENTAL FE) 1 EACH: 325 (65 FE) TAB at 08:12

## 2021-12-24 RX ADMIN — ZOLPIDEM TARTRATE 10 MG: 5 TABLET, COATED ORAL at 01:12

## 2021-12-25 VITALS
TEMPERATURE: 98 F | BODY MASS INDEX: 29.86 KG/M2 | WEIGHT: 162.25 LBS | DIASTOLIC BLOOD PRESSURE: 52 MMHG | OXYGEN SATURATION: 99 % | HEIGHT: 62 IN | HEART RATE: 95 BPM | SYSTOLIC BLOOD PRESSURE: 94 MMHG | RESPIRATION RATE: 19 BRPM

## 2021-12-25 LAB
ANION GAP SERPL CALC-SCNC: 11 MMOL/L (ref 8–16)
AORTIC ROOT ANNULUS: 3.17 CM
ASCENDING AORTA: 2.93 CM
AV INDEX (PROSTH): 0.59
AV MEAN GRADIENT: 5 MMHG
AV PEAK GRADIENT: 8 MMHG
AV VALVE AREA: 1.77 CM2
AV VELOCITY RATIO: 0.62
BASOPHILS # BLD AUTO: 0.03 K/UL (ref 0–0.2)
BASOPHILS NFR BLD: 0.6 % (ref 0–1.9)
BILIRUB UR QL STRIP: NEGATIVE
BSA FOR ECHO PROCEDURE: 1.82 M2
BUN SERPL-MCNC: 27 MG/DL (ref 8–23)
CALCIUM SERPL-MCNC: 8.7 MG/DL (ref 8.7–10.5)
CHLORIDE SERPL-SCNC: 107 MMOL/L (ref 95–110)
CLARITY UR: CLEAR
CO2 SERPL-SCNC: 25 MMOL/L (ref 23–29)
COLOR UR: YELLOW
CREAT SERPL-MCNC: 2.5 MG/DL (ref 0.5–1.4)
CV ECHO LV RWT: 0.69 CM
DIFFERENTIAL METHOD: ABNORMAL
DOP CALC AO PEAK VEL: 1.38 M/S
DOP CALC AO VTI: 27.2 CM
DOP CALC LVOT AREA: 3 CM2
DOP CALC LVOT DIAMETER: 1.95 CM
DOP CALC LVOT PEAK VEL: 0.85 M/S
DOP CALC LVOT STROKE VOLUME: 48.06 CM3
DOP CALC RVOT PEAK VEL: 0.59 M/S
DOP CALC RVOT VTI: 8.8 CM
DOP CALCLVOT PEAK VEL VTI: 16.1 CM
E WAVE DECELERATION TIME: 290.41 MSEC
E/A RATIO: 2.08
ECHO EF ESTIMATED: 64 %
ECHO LV POSTERIOR WALL: 1.26 CM (ref 0.6–1.1)
EJECTION FRACTION: 60 %
EOSINOPHIL # BLD AUTO: 0.1 K/UL (ref 0–0.5)
EOSINOPHIL NFR BLD: 2 % (ref 0–8)
ERYTHROCYTE [DISTWIDTH] IN BLOOD BY AUTOMATED COUNT: 14.9 % (ref 11.5–14.5)
EST. GFR  (AFRICAN AMERICAN): 22 ML/MIN/1.73 M^2
EST. GFR  (NON AFRICAN AMERICAN): 19 ML/MIN/1.73 M^2
FRACTIONAL SHORTENING: 34 % (ref 28–44)
GLUCOSE SERPL-MCNC: 89 MG/DL (ref 70–110)
GLUCOSE UR QL STRIP: NEGATIVE
HCT VFR BLD AUTO: 25.4 % (ref 37–48.5)
HGB BLD-MCNC: 7.7 G/DL (ref 12–16)
HGB UR QL STRIP: NEGATIVE
IMM GRANULOCYTES # BLD AUTO: 0.04 K/UL (ref 0–0.04)
IMM GRANULOCYTES NFR BLD AUTO: 0.7 % (ref 0–0.5)
INTERVENTRICULAR SEPTUM: 1.17 CM (ref 0.6–1.1)
IVC DIAMETER: 1.32 CM
IVRT: 59.94 MSEC
KETONES UR QL STRIP: NEGATIVE
LEFT ATRIUM SIZE: 3.49 CM
LEFT INTERNAL DIMENSION IN SYSTOLE: 2.4 CM (ref 2.1–4)
LEFT VENTRICLE DIASTOLIC VOLUME INDEX: 32.35 ML/M2
LEFT VENTRICLE DIASTOLIC VOLUME: 56.61 ML
LEFT VENTRICLE MASS INDEX: 84 G/M2
LEFT VENTRICLE SYSTOLIC VOLUME INDEX: 11.6 ML/M2
LEFT VENTRICLE SYSTOLIC VOLUME: 20.23 ML
LEFT VENTRICULAR INTERNAL DIMENSION IN DIASTOLE: 3.66 CM (ref 3.5–6)
LEFT VENTRICULAR MASS: 147.73 G
LEUKOCYTE ESTERASE UR QL STRIP: NEGATIVE
LVOT MG: 1.81 MMHG
LVOT MV: 0.63 CM/S
LYMPHOCYTES # BLD AUTO: 0.8 K/UL (ref 1–4.8)
LYMPHOCYTES NFR BLD: 14.7 % (ref 18–48)
MCH RBC QN AUTO: 27.6 PG (ref 27–31)
MCHC RBC AUTO-ENTMCNC: 30.3 G/DL (ref 32–36)
MCV RBC AUTO: 91 FL (ref 82–98)
MONOCYTES # BLD AUTO: 0.7 K/UL (ref 0.3–1)
MONOCYTES NFR BLD: 11.9 % (ref 4–15)
MV PEAK A VEL: 0.38 M/S
MV PEAK E VEL: 0.79 M/S
MV STENOSIS PRESSURE HALF TIME: 84.22 MS
MV VALVE AREA P 1/2 METHOD: 2.61 CM2
NEUTROPHILS # BLD AUTO: 3.8 K/UL (ref 1.8–7.7)
NEUTROPHILS NFR BLD: 70.1 % (ref 38–73)
NITRITE UR QL STRIP: NEGATIVE
NRBC BLD-RTO: 2 /100 WBC
PH UR STRIP: 6 [PH] (ref 5–8)
PISA TR MAX VEL: 3.41 M/S
PLATELET # BLD AUTO: 201 K/UL (ref 150–450)
PMV BLD AUTO: 9.5 FL (ref 9.2–12.9)
POTASSIUM SERPL-SCNC: 4.5 MMOL/L (ref 3.5–5.1)
PROT UR QL STRIP: NEGATIVE
PV MEAN GRADIENT: 0.91 MMHG
RA MAJOR: 5.48 CM
RA PRESSURE: 3 MMHG
RA WIDTH: 2.75 CM
RBC # BLD AUTO: 2.79 M/UL (ref 4–5.4)
SODIUM SERPL-SCNC: 143 MMOL/L (ref 136–145)
SP GR UR STRIP: 1.01 (ref 1–1.03)
TR MAX PG: 47 MMHG
TV REST PULMONARY ARTERY PRESSURE: 50 MMHG
URN SPEC COLLECT METH UR: NORMAL
UROBILINOGEN UR STRIP-ACNC: NEGATIVE EU/DL
VANCOMYCIN SERPL-MCNC: 17.5 UG/ML
WBC # BLD AUTO: 5.45 K/UL (ref 3.9–12.7)

## 2021-12-25 PROCEDURE — 25000003 PHARM REV CODE 250: Mod: HCNC | Performed by: SURGERY

## 2021-12-25 PROCEDURE — 94799 UNLISTED PULMONARY SVC/PX: CPT | Mod: HCNC

## 2021-12-25 PROCEDURE — 63600175 PHARM REV CODE 636 W HCPCS: Mod: HCNC | Performed by: SURGERY

## 2021-12-25 PROCEDURE — 80202 ASSAY OF VANCOMYCIN: CPT | Mod: HCNC | Performed by: INTERNAL MEDICINE

## 2021-12-25 PROCEDURE — 63600175 PHARM REV CODE 636 W HCPCS: Mod: HCNC | Performed by: NURSE PRACTITIONER

## 2021-12-25 PROCEDURE — 99214 OFFICE O/P EST MOD 30 MIN: CPT | Mod: HCNC,,, | Performed by: INTERNAL MEDICINE

## 2021-12-25 PROCEDURE — 81003 URINALYSIS AUTO W/O SCOPE: CPT | Mod: HCNC | Performed by: SURGERY

## 2021-12-25 PROCEDURE — 85025 COMPLETE CBC W/AUTO DIFF WBC: CPT | Mod: HCNC | Performed by: SURGERY

## 2021-12-25 PROCEDURE — A4216 STERILE WATER/SALINE, 10 ML: HCPCS | Mod: HCNC | Performed by: NURSE PRACTITIONER

## 2021-12-25 PROCEDURE — 99214 PR OFFICE/OUTPT VISIT, EST, LEVL IV, 30-39 MIN: ICD-10-PCS | Mod: HCNC,,, | Performed by: INTERNAL MEDICINE

## 2021-12-25 PROCEDURE — 25000003 PHARM REV CODE 250: Mod: HCNC | Performed by: NURSE PRACTITIONER

## 2021-12-25 PROCEDURE — 80048 BASIC METABOLIC PNL TOTAL CA: CPT | Mod: HCNC | Performed by: SURGERY

## 2021-12-25 RX ORDER — HYDROCODONE BITARTRATE AND ACETAMINOPHEN 5; 325 MG/1; MG/1
1 TABLET ORAL EVERY 6 HOURS PRN
Status: DISCONTINUED | OUTPATIENT
Start: 2021-12-25 | End: 2021-12-25 | Stop reason: HOSPADM

## 2021-12-25 RX ORDER — HYDROCODONE BITARTRATE AND ACETAMINOPHEN 7.5; 325 MG/1; MG/1
1 TABLET ORAL EVERY 6 HOURS PRN
Status: DISCONTINUED | OUTPATIENT
Start: 2021-12-25 | End: 2021-12-25 | Stop reason: HOSPADM

## 2021-12-25 RX ORDER — DOXYCYCLINE 100 MG/1
100 CAPSULE ORAL EVERY 12 HOURS
Qty: 14 CAPSULE | Refills: 0 | Status: SHIPPED | OUTPATIENT
Start: 2021-12-25 | End: 2022-01-01

## 2021-12-25 RX ORDER — HYDROCODONE BITARTRATE AND ACETAMINOPHEN 10; 325 MG/1; MG/1
1 TABLET ORAL EVERY 6 HOURS PRN
Qty: 16 TABLET | Refills: 0 | Status: SHIPPED | OUTPATIENT
Start: 2021-12-25 | End: 2022-01-06

## 2021-12-25 RX ADMIN — AMLODIPINE BESYLATE 2.5 MG: 2.5 TABLET ORAL at 09:12

## 2021-12-25 RX ADMIN — DULOXETINE 30 MG: 30 CAPSULE, DELAYED RELEASE ORAL at 09:12

## 2021-12-25 RX ADMIN — VANCOMYCIN HYDROCHLORIDE 125 MG: 10 INJECTION, POWDER, LYOPHILIZED, FOR SOLUTION INTRAVENOUS at 02:12

## 2021-12-25 RX ADMIN — ASPIRIN 81 MG: 81 TABLET, COATED ORAL at 09:12

## 2021-12-25 RX ADMIN — HYDROCODONE BITARTRATE AND ACETAMINOPHEN 1 TABLET: 10; 325 TABLET ORAL at 11:12

## 2021-12-25 RX ADMIN — CEFEPIME HYDROCHLORIDE 1 G: 1 INJECTION, SOLUTION INTRAVENOUS at 10:12

## 2021-12-25 RX ADMIN — PREGABALIN 50 MG: 25 CAPSULE ORAL at 09:12

## 2021-12-25 RX ADMIN — CARVEDILOL 3.12 MG: 3.12 TABLET, FILM COATED ORAL at 09:12

## 2021-12-25 RX ADMIN — VANCOMYCIN HYDROCHLORIDE 125 MG: 10 INJECTION, POWDER, LYOPHILIZED, FOR SOLUTION INTRAVENOUS at 09:12

## 2021-12-25 RX ADMIN — LOSARTAN POTASSIUM 25 MG: 25 TABLET, FILM COATED ORAL at 09:12

## 2021-12-25 RX ADMIN — HYDROCODONE BITARTRATE AND ACETAMINOPHEN 1 TABLET: 10; 325 TABLET ORAL at 05:12

## 2021-12-25 RX ADMIN — ONDANSETRON 4 MG: 2 INJECTION INTRAMUSCULAR; INTRAVENOUS at 12:12

## 2021-12-25 RX ADMIN — HYDRALAZINE HYDROCHLORIDE 100 MG: 50 TABLET ORAL at 02:12

## 2021-12-25 RX ADMIN — FUROSEMIDE 20 MG: 20 TABLET ORAL at 09:12

## 2021-12-25 RX ADMIN — BACLOFEN 10 MG: 10 TABLET ORAL at 06:12

## 2021-12-25 RX ADMIN — FERROUS SULFATE TAB 325 MG (65 MG ELEMENTAL FE) 1 EACH: 325 (65 FE) TAB at 09:12

## 2021-12-25 RX ADMIN — HYDROCODONE BITARTRATE AND ACETAMINOPHEN 1 TABLET: 10; 325 TABLET ORAL at 03:12

## 2021-12-25 RX ADMIN — THERA TABS 1 TABLET: TAB at 09:12

## 2021-12-25 RX ADMIN — HYDRALAZINE HYDROCHLORIDE 100 MG: 50 TABLET ORAL at 05:12

## 2021-12-25 RX ADMIN — BUSPIRONE HYDROCHLORIDE 10 MG: 10 TABLET ORAL at 09:12

## 2021-12-25 RX ADMIN — CHLORHEXIDINE GLUCONATE 0.12% ORAL RINSE 10 ML: 1.2 LIQUID ORAL at 09:12

## 2021-12-25 RX ADMIN — PANTOPRAZOLE SODIUM 40 MG: 40 TABLET, DELAYED RELEASE ORAL at 09:12

## 2021-12-27 ENCOUNTER — LAB VISIT (OUTPATIENT)
Dept: LAB | Facility: HOSPITAL | Age: 66
End: 2021-12-27
Attending: NURSE PRACTITIONER
Payer: MEDICARE

## 2021-12-27 ENCOUNTER — OFFICE VISIT (OUTPATIENT)
Dept: PRIMARY CARE CLINIC | Facility: CLINIC | Age: 66
End: 2021-12-27
Payer: MEDICARE

## 2021-12-27 VITALS
HEART RATE: 111 BPM | WEIGHT: 172.38 LBS | OXYGEN SATURATION: 98 % | BODY MASS INDEX: 31.72 KG/M2 | DIASTOLIC BLOOD PRESSURE: 74 MMHG | SYSTOLIC BLOOD PRESSURE: 122 MMHG | TEMPERATURE: 98 F | HEIGHT: 62 IN

## 2021-12-27 DIAGNOSIS — L03.112 CELLULITIS OF AXILLA, LEFT: ICD-10-CM

## 2021-12-27 DIAGNOSIS — N18.4 ANEMIA DUE TO STAGE 4 CHRONIC KIDNEY DISEASE: ICD-10-CM

## 2021-12-27 DIAGNOSIS — A04.72 C. DIFFICILE COLITIS: ICD-10-CM

## 2021-12-27 DIAGNOSIS — D63.1 ANEMIA DUE TO STAGE 4 CHRONIC KIDNEY DISEASE: Primary | ICD-10-CM

## 2021-12-27 DIAGNOSIS — D63.1 ANEMIA DUE TO STAGE 4 CHRONIC KIDNEY DISEASE: ICD-10-CM

## 2021-12-27 DIAGNOSIS — D50.9 IRON DEFICIENCY ANEMIA, UNSPECIFIED IRON DEFICIENCY ANEMIA TYPE: ICD-10-CM

## 2021-12-27 DIAGNOSIS — N18.4 ANEMIA DUE TO STAGE 4 CHRONIC KIDNEY DISEASE: Primary | ICD-10-CM

## 2021-12-27 LAB
BASOPHILS # BLD AUTO: 0.02 K/UL (ref 0–0.2)
BASOPHILS NFR BLD: 0.4 % (ref 0–1.9)
DIFFERENTIAL METHOD: ABNORMAL
EOSINOPHIL # BLD AUTO: 0.1 K/UL (ref 0–0.5)
EOSINOPHIL NFR BLD: 1.7 % (ref 0–8)
ERYTHROCYTE [DISTWIDTH] IN BLOOD BY AUTOMATED COUNT: 15.2 % (ref 11.5–14.5)
FERRITIN SERPL-MCNC: 834 NG/ML (ref 20–300)
HCT VFR BLD AUTO: 26 % (ref 37–48.5)
HGB BLD-MCNC: 7.8 G/DL (ref 12–16)
IMM GRANULOCYTES # BLD AUTO: 0.05 K/UL (ref 0–0.04)
IMM GRANULOCYTES NFR BLD AUTO: 0.9 % (ref 0–0.5)
IRON SERPL-MCNC: 46 UG/DL (ref 30–160)
LYMPHOCYTES # BLD AUTO: 0.8 K/UL (ref 1–4.8)
LYMPHOCYTES NFR BLD: 15.3 % (ref 18–48)
MCH RBC QN AUTO: 28.7 PG (ref 27–31)
MCHC RBC AUTO-ENTMCNC: 30 G/DL (ref 32–36)
MCV RBC AUTO: 96 FL (ref 82–98)
MONOCYTES # BLD AUTO: 0.7 K/UL (ref 0.3–1)
MONOCYTES NFR BLD: 12.1 % (ref 4–15)
NEUTROPHILS # BLD AUTO: 3.7 K/UL (ref 1.8–7.7)
NEUTROPHILS NFR BLD: 69.6 % (ref 38–73)
NRBC BLD-RTO: 0 /100 WBC
PLATELET # BLD AUTO: 217 K/UL (ref 150–450)
PMV BLD AUTO: 9.9 FL (ref 9.2–12.9)
RBC # BLD AUTO: 2.72 M/UL (ref 4–5.4)
RETICS/RBC NFR AUTO: 2.5 % (ref 0.5–2.5)
SATURATED IRON: 21 % (ref 20–50)
TOTAL IRON BINDING CAPACITY: 216 UG/DL (ref 250–450)
TRANSFERRIN SERPL-MCNC: 146 MG/DL (ref 200–375)
WBC # BLD AUTO: 5.37 K/UL (ref 3.9–12.7)

## 2021-12-27 PROCEDURE — 1125F AMNT PAIN NOTED PAIN PRSNT: CPT | Mod: HCNC,CPTII,S$GLB, | Performed by: NURSE PRACTITIONER

## 2021-12-27 PROCEDURE — 85025 COMPLETE CBC W/AUTO DIFF WBC: CPT | Mod: HCNC | Performed by: NURSE PRACTITIONER

## 2021-12-27 PROCEDURE — 3074F SYST BP LT 130 MM HG: CPT | Mod: HCNC,CPTII,S$GLB, | Performed by: NURSE PRACTITIONER

## 2021-12-27 PROCEDURE — 3288F PR FALLS RISK ASSESSMENT DOCUMENTED: ICD-10-PCS | Mod: HCNC,CPTII,S$GLB, | Performed by: NURSE PRACTITIONER

## 2021-12-27 PROCEDURE — 99215 PR OFFICE/OUTPT VISIT, EST, LEVL V, 40-54 MIN: ICD-10-PCS | Mod: HCNC,S$GLB,, | Performed by: NURSE PRACTITIONER

## 2021-12-27 PROCEDURE — 3044F HG A1C LEVEL LT 7.0%: CPT | Mod: HCNC,CPTII,S$GLB, | Performed by: NURSE PRACTITIONER

## 2021-12-27 PROCEDURE — 1159F PR MEDICATION LIST DOCUMENTED IN MEDICAL RECORD: ICD-10-PCS | Mod: HCNC,CPTII,S$GLB, | Performed by: NURSE PRACTITIONER

## 2021-12-27 PROCEDURE — 3074F PR MOST RECENT SYSTOLIC BLOOD PRESSURE < 130 MM HG: ICD-10-PCS | Mod: HCNC,CPTII,S$GLB, | Performed by: NURSE PRACTITIONER

## 2021-12-27 PROCEDURE — 3078F DIAST BP <80 MM HG: CPT | Mod: HCNC,CPTII,S$GLB, | Performed by: NURSE PRACTITIONER

## 2021-12-27 PROCEDURE — 3066F PR DOCUMENTATION OF TREATMENT FOR NEPHROPATHY: ICD-10-PCS | Mod: HCNC,CPTII,S$GLB, | Performed by: NURSE PRACTITIONER

## 2021-12-27 PROCEDURE — 4010F ACE/ARB THERAPY RXD/TAKEN: CPT | Mod: HCNC,CPTII,S$GLB, | Performed by: NURSE PRACTITIONER

## 2021-12-27 PROCEDURE — 99999 PR PBB SHADOW E&M-EST. PATIENT-LVL III: ICD-10-PCS | Mod: PBBFAC,HCNC,, | Performed by: NURSE PRACTITIONER

## 2021-12-27 PROCEDURE — 3008F BODY MASS INDEX DOCD: CPT | Mod: HCNC,CPTII,S$GLB, | Performed by: NURSE PRACTITIONER

## 2021-12-27 PROCEDURE — 82728 ASSAY OF FERRITIN: CPT | Mod: HCNC | Performed by: NURSE PRACTITIONER

## 2021-12-27 PROCEDURE — 3066F NEPHROPATHY DOC TX: CPT | Mod: HCNC,CPTII,S$GLB, | Performed by: NURSE PRACTITIONER

## 2021-12-27 PROCEDURE — 84466 ASSAY OF TRANSFERRIN: CPT | Mod: HCNC | Performed by: NURSE PRACTITIONER

## 2021-12-27 PROCEDURE — 36415 COLL VENOUS BLD VENIPUNCTURE: CPT | Mod: HCNC | Performed by: NURSE PRACTITIONER

## 2021-12-27 PROCEDURE — 1159F MED LIST DOCD IN RCRD: CPT | Mod: HCNC,CPTII,S$GLB, | Performed by: NURSE PRACTITIONER

## 2021-12-27 PROCEDURE — 3288F FALL RISK ASSESSMENT DOCD: CPT | Mod: HCNC,CPTII,S$GLB, | Performed by: NURSE PRACTITIONER

## 2021-12-27 PROCEDURE — 1101F PT FALLS ASSESS-DOCD LE1/YR: CPT | Mod: HCNC,CPTII,S$GLB, | Performed by: NURSE PRACTITIONER

## 2021-12-27 PROCEDURE — 1125F PR PAIN SEVERITY QUANTIFIED, PAIN PRESENT: ICD-10-PCS | Mod: HCNC,CPTII,S$GLB, | Performed by: NURSE PRACTITIONER

## 2021-12-27 PROCEDURE — 99215 OFFICE O/P EST HI 40 MIN: CPT | Mod: HCNC,S$GLB,, | Performed by: NURSE PRACTITIONER

## 2021-12-27 PROCEDURE — 1101F PR PT FALLS ASSESS DOC 0-1 FALLS W/OUT INJ PAST YR: ICD-10-PCS | Mod: HCNC,CPTII,S$GLB, | Performed by: NURSE PRACTITIONER

## 2021-12-27 PROCEDURE — 3078F PR MOST RECENT DIASTOLIC BLOOD PRESSURE < 80 MM HG: ICD-10-PCS | Mod: HCNC,CPTII,S$GLB, | Performed by: NURSE PRACTITIONER

## 2021-12-27 PROCEDURE — 4010F PR ACE/ARB THEARPY RXD/TAKEN: ICD-10-PCS | Mod: HCNC,CPTII,S$GLB, | Performed by: NURSE PRACTITIONER

## 2021-12-27 PROCEDURE — 85045 AUTOMATED RETICULOCYTE COUNT: CPT | Mod: HCNC | Performed by: NURSE PRACTITIONER

## 2021-12-27 PROCEDURE — 3044F PR MOST RECENT HEMOGLOBIN A1C LEVEL <7.0%: ICD-10-PCS | Mod: HCNC,CPTII,S$GLB, | Performed by: NURSE PRACTITIONER

## 2021-12-27 PROCEDURE — 3008F PR BODY MASS INDEX (BMI) DOCUMENTED: ICD-10-PCS | Mod: HCNC,CPTII,S$GLB, | Performed by: NURSE PRACTITIONER

## 2021-12-27 PROCEDURE — 99999 PR PBB SHADOW E&M-EST. PATIENT-LVL III: CPT | Mod: PBBFAC,HCNC,, | Performed by: NURSE PRACTITIONER

## 2021-12-29 ENCOUNTER — OFFICE VISIT (OUTPATIENT)
Dept: SURGERY | Facility: CLINIC | Age: 66
End: 2021-12-29
Payer: MEDICARE

## 2021-12-29 ENCOUNTER — LAB VISIT (OUTPATIENT)
Dept: LAB | Facility: HOSPITAL | Age: 66
End: 2021-12-29
Attending: INTERNAL MEDICINE
Payer: MEDICARE

## 2021-12-29 VITALS
WEIGHT: 169.06 LBS | HEART RATE: 104 BPM | SYSTOLIC BLOOD PRESSURE: 130 MMHG | TEMPERATURE: 98 F | DIASTOLIC BLOOD PRESSURE: 87 MMHG | BODY MASS INDEX: 30.93 KG/M2

## 2021-12-29 DIAGNOSIS — M81.0 OSTEOPOROSIS, UNSPECIFIED OSTEOPOROSIS TYPE, UNSPECIFIED PATHOLOGICAL FRACTURE PRESENCE: ICD-10-CM

## 2021-12-29 DIAGNOSIS — D05.12 DUCTAL CARCINOMA IN SITU (DCIS) OF LEFT BREAST: Primary | ICD-10-CM

## 2021-12-29 DIAGNOSIS — D61.811 DRUG-INDUCED PANCYTOPENIA: ICD-10-CM

## 2021-12-29 LAB
25(OH)D3+25(OH)D2 SERPL-MCNC: 41 NG/ML (ref 30–96)
ALBUMIN SERPL BCP-MCNC: 3 G/DL (ref 3.5–5.2)
ALP SERPL-CCNC: 115 U/L (ref 55–135)
ALT SERPL W/O P-5'-P-CCNC: 21 U/L (ref 10–44)
ANION GAP SERPL CALC-SCNC: 12 MMOL/L (ref 8–16)
AST SERPL-CCNC: 30 U/L (ref 10–40)
BACTERIA BLD CULT: NORMAL
BACTERIA BLD CULT: NORMAL
BACTERIA SPEC AEROBE CULT: NO GROWTH
BASOPHILS # BLD AUTO: 0.01 K/UL (ref 0–0.2)
BASOPHILS NFR BLD: 0.2 % (ref 0–1.9)
BILIRUB SERPL-MCNC: 0.6 MG/DL (ref 0.1–1)
BUN SERPL-MCNC: 20 MG/DL (ref 8–23)
CALCIUM SERPL-MCNC: 9.3 MG/DL (ref 8.7–10.5)
CHLORIDE SERPL-SCNC: 106 MMOL/L (ref 95–110)
CO2 SERPL-SCNC: 24 MMOL/L (ref 23–29)
CREAT SERPL-MCNC: 2.1 MG/DL (ref 0.5–1.4)
DIFFERENTIAL METHOD: ABNORMAL
EOSINOPHIL # BLD AUTO: 0.1 K/UL (ref 0–0.5)
EOSINOPHIL NFR BLD: 2.6 % (ref 0–8)
ERYTHROCYTE [DISTWIDTH] IN BLOOD BY AUTOMATED COUNT: 14.7 % (ref 11.5–14.5)
EST. GFR  (AFRICAN AMERICAN): 27 ML/MIN/1.73 M^2
EST. GFR  (NON AFRICAN AMERICAN): 24 ML/MIN/1.73 M^2
GLUCOSE SERPL-MCNC: 103 MG/DL (ref 70–110)
HCT VFR BLD AUTO: 26.3 % (ref 37–48.5)
HGB BLD-MCNC: 8.2 G/DL (ref 12–16)
IMM GRANULOCYTES # BLD AUTO: 0.06 K/UL (ref 0–0.04)
IMM GRANULOCYTES NFR BLD AUTO: 1.4 % (ref 0–0.5)
LYMPHOCYTES # BLD AUTO: 0.8 K/UL (ref 1–4.8)
LYMPHOCYTES NFR BLD: 18.5 % (ref 18–48)
MCH RBC QN AUTO: 28.6 PG (ref 27–31)
MCHC RBC AUTO-ENTMCNC: 31.2 G/DL (ref 32–36)
MCV RBC AUTO: 92 FL (ref 82–98)
MONOCYTES # BLD AUTO: 0.5 K/UL (ref 0.3–1)
MONOCYTES NFR BLD: 11.4 % (ref 4–15)
NEUTROPHILS # BLD AUTO: 2.8 K/UL (ref 1.8–7.7)
NEUTROPHILS NFR BLD: 65.9 % (ref 38–73)
NRBC BLD-RTO: 0 /100 WBC
PLATELET # BLD AUTO: 221 K/UL (ref 150–450)
PMV BLD AUTO: 8.7 FL (ref 9.2–12.9)
POTASSIUM SERPL-SCNC: 3.7 MMOL/L (ref 3.5–5.1)
PROT SERPL-MCNC: 7.5 G/DL (ref 6–8.4)
RBC # BLD AUTO: 2.87 M/UL (ref 4–5.4)
SODIUM SERPL-SCNC: 142 MMOL/L (ref 136–145)
WBC # BLD AUTO: 4.22 K/UL (ref 3.9–12.7)

## 2021-12-29 PROCEDURE — 1160F RVW MEDS BY RX/DR IN RCRD: CPT | Mod: HCNC,CPTII,S$GLB, | Performed by: SURGERY

## 2021-12-29 PROCEDURE — 99024 POSTOP FOLLOW-UP VISIT: CPT | Mod: HCNC,S$GLB,, | Performed by: SURGERY

## 2021-12-29 PROCEDURE — 99024 PR POST-OP FOLLOW-UP VISIT: ICD-10-PCS | Mod: HCNC,S$GLB,, | Performed by: SURGERY

## 2021-12-29 PROCEDURE — 3008F PR BODY MASS INDEX (BMI) DOCUMENTED: ICD-10-PCS | Mod: HCNC,CPTII,S$GLB, | Performed by: SURGERY

## 2021-12-29 PROCEDURE — 99999 PR PBB SHADOW E&M-EST. PATIENT-LVL II: ICD-10-PCS | Mod: PBBFAC,HCNC,, | Performed by: SURGERY

## 2021-12-29 PROCEDURE — 3075F PR MOST RECENT SYSTOLIC BLOOD PRESS GE 130-139MM HG: ICD-10-PCS | Mod: HCNC,CPTII,S$GLB, | Performed by: SURGERY

## 2021-12-29 PROCEDURE — 1159F MED LIST DOCD IN RCRD: CPT | Mod: HCNC,CPTII,S$GLB, | Performed by: SURGERY

## 2021-12-29 PROCEDURE — 1126F PR PAIN SEVERITY QUANTIFIED, NO PAIN PRESENT: ICD-10-PCS | Mod: HCNC,CPTII,S$GLB, | Performed by: SURGERY

## 2021-12-29 PROCEDURE — 4010F PR ACE/ARB THEARPY RXD/TAKEN: ICD-10-PCS | Mod: HCNC,CPTII,S$GLB, | Performed by: SURGERY

## 2021-12-29 PROCEDURE — 3008F BODY MASS INDEX DOCD: CPT | Mod: HCNC,CPTII,S$GLB, | Performed by: SURGERY

## 2021-12-29 PROCEDURE — 1160F PR REVIEW ALL MEDS BY PRESCRIBER/CLIN PHARMACIST DOCUMENTED: ICD-10-PCS | Mod: HCNC,CPTII,S$GLB, | Performed by: SURGERY

## 2021-12-29 PROCEDURE — 99999 PR PBB SHADOW E&M-EST. PATIENT-LVL II: CPT | Mod: PBBFAC,HCNC,, | Performed by: SURGERY

## 2021-12-29 PROCEDURE — 80053 COMPREHEN METABOLIC PANEL: CPT | Mod: HCNC | Performed by: INTERNAL MEDICINE

## 2021-12-29 PROCEDURE — 3079F DIAST BP 80-89 MM HG: CPT | Mod: HCNC,CPTII,S$GLB, | Performed by: SURGERY

## 2021-12-29 PROCEDURE — 3066F PR DOCUMENTATION OF TREATMENT FOR NEPHROPATHY: ICD-10-PCS | Mod: HCNC,CPTII,S$GLB, | Performed by: SURGERY

## 2021-12-29 PROCEDURE — 3044F PR MOST RECENT HEMOGLOBIN A1C LEVEL <7.0%: ICD-10-PCS | Mod: HCNC,CPTII,S$GLB, | Performed by: SURGERY

## 2021-12-29 PROCEDURE — 3075F SYST BP GE 130 - 139MM HG: CPT | Mod: HCNC,CPTII,S$GLB, | Performed by: SURGERY

## 2021-12-29 PROCEDURE — 85025 COMPLETE CBC W/AUTO DIFF WBC: CPT | Mod: HCNC | Performed by: NURSE PRACTITIONER

## 2021-12-29 PROCEDURE — 82306 VITAMIN D 25 HYDROXY: CPT | Mod: HCNC | Performed by: INTERNAL MEDICINE

## 2021-12-29 PROCEDURE — 1159F PR MEDICATION LIST DOCUMENTED IN MEDICAL RECORD: ICD-10-PCS | Mod: HCNC,CPTII,S$GLB, | Performed by: SURGERY

## 2021-12-29 PROCEDURE — 3066F NEPHROPATHY DOC TX: CPT | Mod: HCNC,CPTII,S$GLB, | Performed by: SURGERY

## 2021-12-29 PROCEDURE — 36415 COLL VENOUS BLD VENIPUNCTURE: CPT | Mod: HCNC | Performed by: INTERNAL MEDICINE

## 2021-12-29 PROCEDURE — 3044F HG A1C LEVEL LT 7.0%: CPT | Mod: HCNC,CPTII,S$GLB, | Performed by: SURGERY

## 2021-12-29 PROCEDURE — 4010F ACE/ARB THERAPY RXD/TAKEN: CPT | Mod: HCNC,CPTII,S$GLB, | Performed by: SURGERY

## 2021-12-29 PROCEDURE — 1126F AMNT PAIN NOTED NONE PRSNT: CPT | Mod: HCNC,CPTII,S$GLB, | Performed by: SURGERY

## 2021-12-29 PROCEDURE — 3079F PR MOST RECENT DIASTOLIC BLOOD PRESSURE 80-89 MM HG: ICD-10-PCS | Mod: HCNC,CPTII,S$GLB, | Performed by: SURGERY

## 2022-01-03 ENCOUNTER — OUTPATIENT CASE MANAGEMENT (OUTPATIENT)
Dept: ADMINISTRATIVE | Facility: OTHER | Age: 67
End: 2022-01-03
Payer: MEDICARE

## 2022-01-03 ENCOUNTER — TELEPHONE (OUTPATIENT)
Dept: PRIMARY CARE CLINIC | Facility: CLINIC | Age: 67
End: 2022-01-03
Payer: MEDICARE

## 2022-01-03 NOTE — TELEPHONE ENCOUNTER
----- Message from Jaja Marina sent at 12/30/2021  3:56 PM CST -----  Pt would like return call, pt states she is needing home health for post-op wound care.  Please call back at .[phone].  Md Evonne

## 2022-01-03 NOTE — TELEPHONE ENCOUNTER
Called & spoke with Germaine ([facility]) regarding the patient having wound care daily. Germaine states that they are not able to provide daily wound care for the patient. She also stated that the patient insurance won't allow it either. In order for the patient to receive daily wound care, she would have to be in a skilled nursing facility. She stated usually in an instance like this, the patient would be taught how to change the dressing for days that HH doesn't go out. So it would have to be 3 times a week.

## 2022-01-03 NOTE — TELEPHONE ENCOUNTER
Discussed situation/inability to provide daily wound care with Germaine at [facility]. She is unsure whether daily wound care has been requested and denied by [facility]. I've encouraged her to find out and that daily wound care needs to be provided. Pt is unable to pack surgical incision to axilla herself and upon last assessment had copious drainage so 3 times weekly will be ineffective until drainage subsides.

## 2022-01-04 ENCOUNTER — PATIENT OUTREACH (OUTPATIENT)
Dept: ADMINISTRATIVE | Facility: OTHER | Age: 67
End: 2022-01-04

## 2022-01-04 NOTE — PROGRESS NOTES
Outpatient Care Management  Plan of Care Follow Up Visit    Patient: Nadia Damon  MRN: [id number]  Date of Service: 01/03/2022  Completed by: Ray Baker RN  Referral Date: 12/03/2021  Program: Case Management (High Risk)    Reason for Visit   Patient presents with    Update Plan Of Care       Brief Summary: Phone contact made with Ms. Damon today and we discussed her depression care plan. Plan to follow up in three weeks.     Patient Summary     Involvement of Care:  Do I have permission to speak with other family members about your care?   no    Patient Reported Labs & Vitals:  1.  Any Patient Reported Labs & Vitals?     2.  Patient Reported Blood Pressure:     3.  Patient Reported Pulse:     4.  Patient Reported Weight (Kg):     5.  Patient Reported Blood Glucose (mg/dl):       Medical and social history was reviewed with patient and/or caregiver.     Clinical Assessment     Reviewed and provided basic information on available community resources for mental health, transportation, wellness resources, and palliative care programs with patient and/or caregiver.     Complex Care Plan     Care plan was discussed and completed today with input from patient and/or caregiver.    Patient Instructions     Instructions were provided via the [employer] patient resources and are available for the patient to view on the patient portal.    Next Steps: follow up in three weeks to continue education and management Ray Baker RN       Todays [facility] Self-Management Care Plan was developed with the patients/caregivers input and was based on identified barriers from todays assessment.  Goals were written today with the patient/caregiver and the patient has agreed to work towards these goals to improve his/her overall well-being. Patient verbalized understanding of the care plan, goals, and all of today's instructions. Encouraged patient/caregiver to communicate with his/her physician and health care team about health  conditions and the treatment plan.  Provided my contact information today and encouraged patient/caregiver to call me with any questions as needed.

## 2022-01-05 ENCOUNTER — LAB VISIT (OUTPATIENT)
Dept: LAB | Facility: HOSPITAL | Age: 67
End: 2022-01-05
Attending: INTERNAL MEDICINE
Payer: MEDICARE

## 2022-01-05 ENCOUNTER — OFFICE VISIT (OUTPATIENT)
Dept: RHEUMATOLOGY | Facility: CLINIC | Age: 67
End: 2022-01-05
Payer: MEDICARE

## 2022-01-05 VITALS
BODY MASS INDEX: 30.93 KG/M2 | HEIGHT: 62 IN | DIASTOLIC BLOOD PRESSURE: 90 MMHG | SYSTOLIC BLOOD PRESSURE: 148 MMHG | HEART RATE: 114 BPM

## 2022-01-05 DIAGNOSIS — M81.0 OSTEOPOROSIS, UNSPECIFIED OSTEOPOROSIS TYPE, UNSPECIFIED PATHOLOGICAL FRACTURE PRESENCE: Primary | ICD-10-CM

## 2022-01-05 DIAGNOSIS — D05.12 DUCTAL CARCINOMA IN SITU (DCIS) OF LEFT BREAST: ICD-10-CM

## 2022-01-05 DIAGNOSIS — Z71.89 COUNSELING ON HEALTH PROMOTION AND DISEASE PREVENTION: ICD-10-CM

## 2022-01-05 LAB
ALBUMIN SERPL BCP-MCNC: 3.4 G/DL (ref 3.5–5.2)
ALP SERPL-CCNC: 104 U/L (ref 55–135)
ALT SERPL W/O P-5'-P-CCNC: 16 U/L (ref 10–44)
ANION GAP SERPL CALC-SCNC: 14 MMOL/L (ref 8–16)
AST SERPL-CCNC: 24 U/L (ref 10–40)
BASOPHILS # BLD AUTO: 0.01 K/UL (ref 0–0.2)
BASOPHILS NFR BLD: 0.2 % (ref 0–1.9)
BILIRUB SERPL-MCNC: 0.8 MG/DL (ref 0.1–1)
BUN SERPL-MCNC: 30 MG/DL (ref 8–23)
CALCIUM SERPL-MCNC: 9.5 MG/DL (ref 8.7–10.5)
CHLORIDE SERPL-SCNC: 104 MMOL/L (ref 95–110)
CO2 SERPL-SCNC: 25 MMOL/L (ref 23–29)
CREAT SERPL-MCNC: 2.7 MG/DL (ref 0.5–1.4)
DIFFERENTIAL METHOD: ABNORMAL
EOSINOPHIL # BLD AUTO: 0.1 K/UL (ref 0–0.5)
EOSINOPHIL NFR BLD: 1.8 % (ref 0–8)
ERYTHROCYTE [DISTWIDTH] IN BLOOD BY AUTOMATED COUNT: 14.9 % (ref 11.5–14.5)
EST. GFR  (AFRICAN AMERICAN): 20 ML/MIN/1.73 M^2
EST. GFR  (NON AFRICAN AMERICAN): 18 ML/MIN/1.73 M^2
GLUCOSE SERPL-MCNC: 110 MG/DL (ref 70–110)
HCT VFR BLD AUTO: 29 % (ref 37–48.5)
HGB BLD-MCNC: 9.1 G/DL (ref 12–16)
IMM GRANULOCYTES # BLD AUTO: 0.02 K/UL (ref 0–0.04)
IMM GRANULOCYTES NFR BLD AUTO: 0.4 % (ref 0–0.5)
LYMPHOCYTES # BLD AUTO: 0.9 K/UL (ref 1–4.8)
LYMPHOCYTES NFR BLD: 17.6 % (ref 18–48)
MCH RBC QN AUTO: 28.5 PG (ref 27–31)
MCHC RBC AUTO-ENTMCNC: 31.4 G/DL (ref 32–36)
MCV RBC AUTO: 91 FL (ref 82–98)
MONOCYTES # BLD AUTO: 0.5 K/UL (ref 0.3–1)
MONOCYTES NFR BLD: 10.1 % (ref 4–15)
NEUTROPHILS # BLD AUTO: 3.5 K/UL (ref 1.8–7.7)
NEUTROPHILS NFR BLD: 69.9 % (ref 38–73)
NRBC BLD-RTO: 0 /100 WBC
PLATELET # BLD AUTO: 187 K/UL (ref 150–450)
PMV BLD AUTO: 8.9 FL (ref 9.2–12.9)
POTASSIUM SERPL-SCNC: 4.3 MMOL/L (ref 3.5–5.1)
PROT SERPL-MCNC: 8 G/DL (ref 6–8.4)
RBC # BLD AUTO: 3.19 M/UL (ref 4–5.4)
SODIUM SERPL-SCNC: 143 MMOL/L (ref 136–145)
WBC # BLD AUTO: 4.94 K/UL (ref 3.9–12.7)

## 2022-01-05 PROCEDURE — 1126F PR PAIN SEVERITY QUANTIFIED, NO PAIN PRESENT: ICD-10-PCS | Mod: HCNC,CPTII,S$GLB, | Performed by: INTERNAL MEDICINE

## 2022-01-05 PROCEDURE — 3077F PR MOST RECENT SYSTOLIC BLOOD PRESSURE >= 140 MM HG: ICD-10-PCS | Mod: HCNC,CPTII,S$GLB, | Performed by: INTERNAL MEDICINE

## 2022-01-05 PROCEDURE — 1159F MED LIST DOCD IN RCRD: CPT | Mod: HCNC,CPTII,S$GLB, | Performed by: INTERNAL MEDICINE

## 2022-01-05 PROCEDURE — 99999 PR PBB SHADOW E&M-EST. PATIENT-LVL IV: CPT | Mod: PBBFAC,HCNC,, | Performed by: INTERNAL MEDICINE

## 2022-01-05 PROCEDURE — 3080F DIAST BP >= 90 MM HG: CPT | Mod: HCNC,CPTII,S$GLB, | Performed by: INTERNAL MEDICINE

## 2022-01-05 PROCEDURE — 1126F AMNT PAIN NOTED NONE PRSNT: CPT | Mod: HCNC,CPTII,S$GLB, | Performed by: INTERNAL MEDICINE

## 2022-01-05 PROCEDURE — 3288F FALL RISK ASSESSMENT DOCD: CPT | Mod: HCNC,CPTII,S$GLB, | Performed by: INTERNAL MEDICINE

## 2022-01-05 PROCEDURE — 85025 COMPLETE CBC W/AUTO DIFF WBC: CPT | Mod: HCNC | Performed by: INTERNAL MEDICINE

## 2022-01-05 PROCEDURE — 3008F BODY MASS INDEX DOCD: CPT | Mod: HCNC,CPTII,S$GLB, | Performed by: INTERNAL MEDICINE

## 2022-01-05 PROCEDURE — 1159F PR MEDICATION LIST DOCUMENTED IN MEDICAL RECORD: ICD-10-PCS | Mod: HCNC,CPTII,S$GLB, | Performed by: INTERNAL MEDICINE

## 2022-01-05 PROCEDURE — 36415 COLL VENOUS BLD VENIPUNCTURE: CPT | Mod: HCNC | Performed by: INTERNAL MEDICINE

## 2022-01-05 PROCEDURE — 3288F PR FALLS RISK ASSESSMENT DOCUMENTED: ICD-10-PCS | Mod: HCNC,CPTII,S$GLB, | Performed by: INTERNAL MEDICINE

## 2022-01-05 PROCEDURE — 3008F PR BODY MASS INDEX (BMI) DOCUMENTED: ICD-10-PCS | Mod: HCNC,CPTII,S$GLB, | Performed by: INTERNAL MEDICINE

## 2022-01-05 PROCEDURE — 99499 RISK ADDL DX/OHS AUDIT: ICD-10-PCS | Mod: S$GLB,,, | Performed by: INTERNAL MEDICINE

## 2022-01-05 PROCEDURE — 3080F PR MOST RECENT DIASTOLIC BLOOD PRESSURE >= 90 MM HG: ICD-10-PCS | Mod: HCNC,CPTII,S$GLB, | Performed by: INTERNAL MEDICINE

## 2022-01-05 PROCEDURE — 3077F SYST BP >= 140 MM HG: CPT | Mod: HCNC,CPTII,S$GLB, | Performed by: INTERNAL MEDICINE

## 2022-01-05 PROCEDURE — 1101F PR PT FALLS ASSESS DOC 0-1 FALLS W/OUT INJ PAST YR: ICD-10-PCS | Mod: HCNC,CPTII,S$GLB, | Performed by: INTERNAL MEDICINE

## 2022-01-05 PROCEDURE — 99214 OFFICE O/P EST MOD 30 MIN: CPT | Mod: HCNC,S$GLB,, | Performed by: INTERNAL MEDICINE

## 2022-01-05 PROCEDURE — 99999 PR PBB SHADOW E&M-EST. PATIENT-LVL IV: ICD-10-PCS | Mod: PBBFAC,HCNC,, | Performed by: INTERNAL MEDICINE

## 2022-01-05 PROCEDURE — 99214 PR OFFICE/OUTPT VISIT, EST, LEVL IV, 30-39 MIN: ICD-10-PCS | Mod: HCNC,S$GLB,, | Performed by: INTERNAL MEDICINE

## 2022-01-05 PROCEDURE — 99499 UNLISTED E&M SERVICE: CPT | Mod: S$GLB,,, | Performed by: INTERNAL MEDICINE

## 2022-01-05 PROCEDURE — 80053 COMPREHEN METABOLIC PANEL: CPT | Mod: HCNC | Performed by: INTERNAL MEDICINE

## 2022-01-05 PROCEDURE — 1101F PT FALLS ASSESS-DOCD LE1/YR: CPT | Mod: HCNC,CPTII,S$GLB, | Performed by: INTERNAL MEDICINE

## 2022-01-05 RX ORDER — CALCITONIN SALMON 200 [IU]/.09ML
1 SPRAY, METERED NASAL DAILY
Qty: 3.7 ML | Refills: 3 | Status: SHIPPED | OUTPATIENT
Start: 2022-01-05 | End: 2022-06-15 | Stop reason: SDUPTHER

## 2022-01-05 RX ORDER — LINEZOLID 600 MG/1
TABLET, FILM COATED ORAL
COMMUNITY
Start: 2021-12-31 | End: 2022-01-12

## 2022-01-05 NOTE — PROGRESS NOTES
RHEUMATOLOGY OUTPATIENT CLINIC NOTE    1/5/2022    Attending Rheumatologist: Prasanth Johnson  Primary Care Provider/Physician Requesting Consultation: Luz Dickson NP   Chief Complaint/Reason For Consultation:  Follow-up, Osteoporosis, and Osteoarthritis      Subjective:     Nadia Damon is a 66 y.o. Black or  female with osteoporosis for follow-up.    No acute complaints.  Patient's main concern is complications from infection from lumpectomy side status post lymph node biopsy for interval diagnosis of left breast DCIS.  Denies falls or fractures since last encounter.  Review of Systems   Constitutional: Negative for fever.   Musculoskeletal: Negative for back pain, falls and joint pain.   Skin: Negative for rash.       Chronic comorbid conditions affecting medical decision making today:  Past Medical History:   Diagnosis Date    Abdominal wall hernia     CT Renal 6/11/2018---Small fat containing superior ventral abdominal wall hernia at the epicardial pacing lead site.    Anxiety     Arthritis     ZEN HIPS    Breast cancer in female 08/2021    LEFT BREAST    Chronic kidney disease     stage 4, GFR 15-29 ml/min    Chronic midline low back pain without sciatica 6/18/2018    Coronary artery disease 1993    heart transplant    Depression     Fibromyalgia     on Lyrica    Heart failure     native heart cardiomyopathy    Heart transplanted 1993    due to cardiomyopathy    History of hyperparathyroidism; Hyperparathyroidism, secondary renal     PT DENIES    Hypertension     Immune disorder     anti rejection meds    Iron deficiency anemia 8/15/2017    Kidney stones     passed per pt    Obesity     Other osteoporosis without current pathological fracture 8/30/2019    Ela 2003 approx    left leg    Trouble in sleeping     Urinary incontinence      Past Surgical History:   Procedure Laterality Date    BLADDER SURGERY  2015 approx    mesh - Dr Everett then 2nd  reconstructive sx Dr Onofre    BREAST BIOPSY Bilateral     NEGATIVE    BREAST SURGERY Left 9/28/15    Bx - benign    BREAST SURGERY Right 12/2015    Bx benign    CARDIAC PACEMAKER REMOVAL Left 06/26/2014    Pacer defirillator removed. Put in 1993 aat time of heart transplant    CARPAL TUNNEL RELEASE Left 03/03/15    Dr. Hall    COLONOSCOPY N/A 2/25/2021    Procedure: COLONOSCOPY;  Surgeon: Freida Ramirez MD;  Location: [facility] ENDO;  Service: Endoscopy;  Laterality: N/A;    HEART TRANSPLANT  1993    HERNIA REPAIR Right 1971 approx    Inguinal    HYSTERECTOMY  1983    vag hyst /LSO     INCISION AND DRAINAGE OF ABSCESS Left 12/24/2021    Procedure: INCISION AND DRAINAGE, ABSCESS;  Surgeon: Joseph Longo MD;  Location: [facility] OR;  Service: General;  Laterality: Left;    INSERTION OF TUNNELED CENTRAL VENOUS CATHETER (CVC) WITH SUBCUTANEOUS PORT N/A 11/9/2021    Procedure: [id number];  Surgeon: Christoph Douglas MD;  Location: [facility] OR;  Service: General;  Laterality: N/A;    SENTINEL LYMPH NODE BIOPSY Left 10/12/2021    Procedure: BIOPSY, LYMPH NODE, SENTINEL;  Surgeon: Christoph Douglas MD;  Location: [facility] OR;  Service: General;  Laterality: Left;    TOE SURGERY       Family History   Problem Relation Age of Onset    Cancer Mother 38        breast    Breast cancer Mother     Heart disease Maternal Grandmother     Cataracts Cousin     Hypertension Son     Diabetes Neg Hx     Stroke Neg Hx     Kidney disease Neg Hx     Asthma Neg Hx     COPD Neg Hx     Melanoma Neg Hx     Hyperlipidemia Neg Hx      Social History     Substance and Sexual Activity   Alcohol Use Never    Alcohol/week: 0.0 standard drinks     Social History     Tobacco Use   Smoking Status Never Smoker   Smokeless Tobacco Never Used     Social History     Substance and Sexual Activity   Drug Use No       Current Outpatient Medications:     acetaminophen (TYLENOL) 325 MG tablet, Take 1 tablet (325 mg total) by mouth every 6  (six) hours as needed for Pain., Disp: , Rfl: 0    amLODIPine (NORVASC) 2.5 MG tablet, Take 1 tablet (2.5 mg total) by mouth once daily., Disp: 90 tablet, Rfl: 3    aspirin (ECOTRIN) 81 MG EC tablet, Take 1 tablet (81 mg total) by mouth once daily., Disp: 90 tablet, Rfl: 3    baclofen (LIORESAL) 10 MG tablet, Take 1 tablet (10 mg total) by mouth every morning., Disp: 90 tablet, Rfl: 1    biotin 10,000 mcg Cap, Take 1 tablet by mouth once daily., Disp: , Rfl:     busPIRone (BUSPAR) 10 MG tablet, Take 1 tablet (10 mg total) by mouth once daily., Disp: 90 tablet, Rfl: 3    carvediloL (COREG) 3.125 MG tablet, Take 1 tablet (3.125 mg total) by mouth 2 (two) times daily., Disp: 180 tablet, Rfl: 3    cloNIDine (CATAPRES) 0.1 MG tablet, Take 1 tablet (0.1 mg total) by mouth every evening., Disp: 90 tablet, Rfl: 3    cycloSPORINE modified, NEORAL, (NEORAL) 25 MG capsule, Take 3 capsules (75 mg total) by mouth 2 (two) times daily., Disp: 270 capsule, Rfl: 11    DULoxetine (CYMBALTA) 30 MG capsule, TAKE 1 CAPSULE EVERY DAY, Disp: 90 capsule, Rfl: 1    EVENING PRIMROSE OIL ORAL, Take 1,000 mg by mouth once daily., Disp: , Rfl:     ferrous sulfate (FEOSOL) 325 mg (65 mg iron) Tab tablet, Take 1 tablet (325 mg total) by mouth once daily., Disp: 100 tablet, Rfl: 3    furosemide (LASIX) 20 MG tablet, Take 1 tablet (20 mg total) by mouth 2 (two) times a day for 5 days, THEN 1 tablet (20 mg total) once daily. Take 1 tablet daily., Disp: 370 tablet, Rfl: 0    hydrALAZINE (APRESOLINE) 50 MG tablet, TAKE 2 TABLETS  EVERY 8  HOURS., Disp: 540 tablet, Rfl: 3    HYDROcodone-acetaminophen (NORCO)  mg per tablet, Take 1 tablet by mouth every 6 (six) hours as needed., Disp: 16 tablet, Rfl: 0    LIDOCAINE VISCOUS 2 % solution, SWISH AND SPIT 10 MLS EVERY 4 (FOUR) HOURS AS NEEDED (MOUTH&/OR THROAT PAIN). FOR MOUTH SORES, Disp: 450 each, Rfl: 11    LIDOcaine-prilocaine (EMLA) cream, Apply topically as needed., Disp: 30 g,  Rfl: 2    losartan (COZAAR) 25 MG tablet, Take 1 tablet (25 mg total) by mouth once daily., Disp: 90 tablet, Rfl: 3    multivitamin capsule, Take 1 capsule by mouth once daily., Disp: , Rfl:     ondansetron (ZOFRAN-ODT) 8 MG TbDL, Take 1 tablet (8 mg total) by mouth every 8 (eight) hours as needed (nausea)., Disp: 60 tablet, Rfl: 5    pantoprazole (PROTONIX) 40 MG tablet, Take 1 tablet (40 mg total) by mouth once daily., Disp: 90 tablet, Rfl: 1    pregabalin (LYRICA) 50 MG capsule, Take 1 capsule (50 mg total) by mouth 2 (two) times daily. NOON & NIGHT TIME, Disp: 180 capsule, Rfl: 1    temazepam (RESTORIL) 30 mg capsule, Take 1 at bedtime, Disp: 90 capsule, Rfl: 1    vancomycin (VANCOCIN) 125 MG capsule, Take 1 capsule (125 mg total) by mouth 4 (four) times daily. for 20 days, Disp: 80 capsule, Rfl: 0    vitamin E 400 UNIT capsule, Take 400 Units by mouth once daily., Disp: , Rfl:     zolpidem (AMBIEN) 10 mg Tab, TAKE 1 TABLET BY MOUTH ONCE DAILY IN THE EVENING, Disp: 90 tablet, Rfl: 1    calcitonin, salmon, (FORTICAL) 200 unit/actuation nasal spray, 1 spray by Nasal route once daily., Disp: 3.7 mL, Rfl: 3    linezolid (ZYVOX) 600 mg Tab, , Disp: , Rfl:     Current Facility-Administered Medications:     denosumab (PROLIA) injection 60 mg, 60 mg, Subcutaneous, Q6 Months, Prasanth Johnson MD    Facility-Administered Medications Ordered in Other Visits:     lactated ringers infusion, , Intravenous, Continuous, Jennifer Carr MD, Last Rate: 10 mL/hr at 11/09/21 0717, Restarted at 11/09/21 0820     Objective:     Vitals:    01/05/22 1119   BP: (!) 148/90   Pulse: (!) 114     Physical Exam   Eyes: Conjunctivae are normal.   Pulmonary/Chest: No respiratory distress.   Musculoskeletal:         General: No swelling or tenderness. Normal range of motion.      Comments: No synovitis on exam       Reviewed available old and all outside pertinent medical records available.    All lab results personally reviewed and  interpreted by me.  Lab Results   Component Value Date    WBC 4.94 01/05/2022    HGB 9.1 (L) 01/05/2022    HCT 29.0 (L) 01/05/2022    MCV 91 01/05/2022    RDW 14.9 (H) 01/05/2022     01/05/2022    PLTEST Appears normal 12/07/2021       Lab Results   Component Value Date     01/05/2022    K 4.3 01/05/2022     01/05/2022    CO2 25 01/05/2022     01/05/2022    BUN 30 (H) 01/05/2022    CALCIUM 9.5 01/05/2022    PROT 8.0 01/05/2022    ALBUMIN 3.4 (L) 01/05/2022    BILITOT 0.8 01/05/2022    AST 24 01/05/2022    ALKPHOS 104 01/05/2022    ALT 16 01/05/2022       Lab Results   Component Value Date    COLORU Yellow 12/25/2021    APPEARANCEUA Clear 12/25/2021    SPECGRAV 1.010 12/25/2021    PHUR 6.0 12/25/2021    PROTEINUA Negative 12/25/2021    KETONESU Negative 12/25/2021    LEUKOCYTESUR Negative 12/25/2021    NITRITE Negative 12/25/2021    UROBILINOGEN Negative 12/25/2021       Lab Results   Component Value Date    .0 (H) 03/09/2021       Lab Results   Component Value Date    URICACID 6.5 (H) 05/28/2019       Lab Results   Component Value Date    .7 (H) 12/23/2021       Lab Results   Component Value Date    ANATITER 1:160 10/22/2020       No components found for: TSPOTTB,  QUANTIFERON     ASSESSMENT:     History of osteoporosis based on episodes of fragility fractures.  On Prolia since September 2019, repeat densitometry August 2021 with osteopenia and low FRAX score.  Interval diagnosis of metastatic left breast DCIS.  Recommend Prolia drug holiday with nasal calcitonin for the next 4-6 months.  Consider repeat densitometry next year depending on therapy per breast CA.  Avoid mobility, fall prevention.  Clinical side effects of calcitonin discussed in detail.    PLAN:     1. Osteoporosis, unspecified osteoporosis type, unspecified pathological fracture presence  - calcitonin, salmon, (FORTICAL) 200 unit/actuation nasal spray; 1 spray by Nasal route once daily.  Dispense: 3.7 mL;  Refill: 3  - Comprehensive Metabolic Panel; Standing  - PTH, Intact; Standing    2. Other specified counseling  - Immunization counseling done.  - Medication counseling provided.    Follow up in about 1 year (around 1/5/2023).      Disclaimer: This note is prepared using voice recognition software and as such is likely to have errors and has not been proof read. Please contact me for questions.     25 minutes of total time spent on the encounter, which includes face to face time and non-face to face time preparing to see the patient (eg, review of tests), Obtaining and/or reviewing separately obtained history, Documenting clinical information in the electronic or other health record, Independently interpreting results (not separately reported) and communicating results to the patient/family/caregiver, or Care coordination (not separately reported).     Prasanth Johnson M.D.

## 2022-01-05 NOTE — PROGRESS NOTES
Subjective:      DATE OF VISIT: 1/7/2022   ?   ?   Patient ID:?Nadia Damon is a 66 y.o. female.?? MR#: [id number]   ?   PRIMARY PROVIDER: Dr. Juan Collins  ?   CHIEF COMPLAINT: Hospital follow-up??   ?   ONCOLOGIC DIAGNOSIS: Ductal carcinoma in situ (DCIS) of left breast  ?   CURRENT TREATMENT: DOCETAXEL CYCLOPHOSPHAMIDE Q3W    PAST TREATMENT: N/A      Ms. Damon is a 66-year-old female with a history of heart transplant in 1993 (on anti-rejection medication), CKD, triple negative breast ca with lymphnode involovement. Initiation of chemotherapy 11/16/2021 (Taxotere/Cytoxan) with Udenyca 11/17/2021. She has been hospitalized twice since this time--post chemo was admitted to the hospital 11/22/2021 with pancytopenia and neutropenic fever and  developed C. Diff. She was seen in the ED 12/12/2021 with c/o of left shoulder pain and on 12/23/2021 was admitted with cellutlitis of left axilla where previous axillary node sampling was performed. Pt is currently on vancomycin for treatment of C.Diff and Zyvox for slow-healing wound to left axilla. She has daily dressing changes by wound care. Per pt she just left wound care appointment and wound had copius amounts of bright green drainage--rates wound pain @ 6/10 aching.  She presents today for one month follow-up and review of lab work. Denies fever, chills, diarrhea, constipation, arthralgias, cough, SOB, dizziness, and CP.               Review of Systems   Constitutional: Positive for appetite change and fatigue. Negative for activity change, chills, diaphoresis, fever and unexpected weight change.   HENT: Negative for congestion, dental problem, drooling, ear discharge, facial swelling, hearing loss, mouth sores, nosebleeds, rhinorrhea, sore throat, tinnitus and trouble swallowing.    Eyes: Negative for photophobia, pain, discharge, redness and itching.   Respiratory: Negative for cough, chest tightness, shortness of breath and wheezing.    Cardiovascular:  Negative for chest pain, palpitations and leg swelling.   Gastrointestinal: Positive for nausea (took pain meds on empty stomach). Negative for abdominal distention, abdominal pain, anal bleeding, blood in stool, constipation, diarrhea, rectal pain and vomiting.   Endocrine: Negative for cold intolerance, heat intolerance, polydipsia, polyphagia and polyuria.   Genitourinary: Negative for decreased urine volume, difficulty urinating, dyspareunia, dysuria, enuresis, flank pain, frequency, hematuria, pelvic pain and urgency.   Musculoskeletal: Negative for arthralgias, back pain, gait problem, joint swelling, myalgias, neck pain and neck stiffness.   Skin: Positive for wound (Left axilla (APOLONIA)). Negative for color change, pallor and rash.   Allergic/Immunologic: Positive for immunocompromised state.   Neurological: Negative for dizziness, tremors, weakness, light-headedness, numbness and headaches.   Hematological: Negative for adenopathy. Does not bruise/bleed easily.   Psychiatric/Behavioral: Negative for confusion. The patient is not nervous/anxious.        ?   A comprehensive 14-point review of systems was reviewed with patient and was negative other than as specified above.   ?     Objective:      Physical Exam  Constitutional:       General: She is in acute distress (pain @6/10 r/t left axillary wound).      Appearance: Normal appearance. She is not ill-appearing, toxic-appearing or diaphoretic.   HENT:      Head: Normocephalic and atraumatic.      Right Ear: External ear normal.      Left Ear: External ear normal.      Nose: Nose normal.      Mouth/Throat:      Mouth: Mucous membranes are moist.      Pharynx: Oropharynx is clear.   Eyes:      Conjunctiva/sclera: Conjunctivae normal.      Pupils: Pupils are equal, round, and reactive to light.   Cardiovascular:      Rate and Rhythm: Normal rate and regular rhythm.      Pulses: Normal pulses.      Heart sounds: Normal heart sounds. No murmur heard.  No friction  rub. No gallop.    Pulmonary:      Effort: Pulmonary effort is normal. No respiratory distress.      Breath sounds: Normal breath sounds. No stridor. No wheezing, rhonchi or rales.   Chest:      Chest wall: No tenderness.   Abdominal:      General: Abdomen is flat. There is no distension.      Palpations: Abdomen is soft.      Tenderness: There is no abdominal tenderness.   Musculoskeletal:         General: No swelling (Trace edema ble). Normal range of motion.      Cervical back: Normal range of motion and neck supple. No rigidity.      Right lower leg: Edema (trace) present.      Left lower leg: Edema (trace) present.   Lymphadenopathy:      Cervical: No cervical adenopathy.   Skin:     General: Skin is warm and dry.   Neurological:      Mental Status: She is alert and oriented to person, place, and time.      Motor: No weakness.      Gait: Gait normal.   Psychiatric:         Mood and Affect: Mood normal.         Behavior: Behavior normal.         Thought Content: Thought content normal.         Judgment: Judgment normal.           ?   Vitals:    01/06/22 1347   BP: 122/79   Pulse: (!) 114   Temp: 97.4 °F (36.3 °C)      ?   ECOG:?2   General appearance: Generally well appearing, in no acute distress.   Head, eyes, ears, nose, and throat: Oropharynx clear with moist mucous membranes.   Cardiovascular: Regular rate and rhythm, S1, S2, no audible murmurs.   Respiratory: Lungs clear to auscultation bilaterally.   Abdomen: nontender, nondistended.   Extremities: Warm, without edema.   Neurologic: Alert and oriented. Grossly normal strength, coordination, and gait.   Skin: No rashes, ecchymoses or petechial lesion.   Psychiatric: normal mood and affect, conversant and appropriate    ?   Laboratory:  ?   No visits with results within 1 Day(s) from this visit.   Latest known visit with results is:   Lab Visit on 01/05/2022   Component Date Value Ref Range Status    Sodium 01/05/2022 143  136 - 145 mmol/L Final     Potassium 01/05/2022 4.3  3.5 - 5.1 mmol/L Final    Chloride 01/05/2022 104  95 - 110 mmol/L Final    CO2 01/05/2022 25  23 - 29 mmol/L Final    Glucose 01/05/2022 110  70 - 110 mg/dL Final    BUN 01/05/2022 30* 8 - 23 mg/dL Final    Creatinine 01/05/2022 2.7* 0.5 - 1.4 mg/dL Final    Calcium 01/05/2022 9.5  8.7 - 10.5 mg/dL Final    Total Protein 01/05/2022 8.0  6.0 - 8.4 g/dL Final    Albumin 01/05/2022 3.4* 3.5 - 5.2 g/dL Final    Total Bilirubin 01/05/2022 0.8  0.1 - 1.0 mg/dL Final    Alkaline Phosphatase 01/05/2022 104  55 - 135 U/L Final    AST 01/05/2022 24  10 - 40 U/L Final    ALT 01/05/2022 16  10 - 44 U/L Final    Anion Gap 01/05/2022 14  8 - 16 mmol/L Final    eGFR if African American 01/05/2022 20* >60 mL/min/1.73 m^2 Final    eGFR if non African American 01/05/2022 18* >60 mL/min/1.73 m^2 Final    WBC 01/05/2022 4.94  3.90 - 12.70 K/uL Final    RBC 01/05/2022 3.19* 4.00 - 5.40 M/uL Final    Hemoglobin 01/05/2022 9.1* 12.0 - 16.0 g/dL Final    Hematocrit 01/05/2022 29.0* 37.0 - 48.5 % Final    MCV 01/05/2022 91  82 - 98 fL Final    MCH 01/05/2022 28.5  27.0 - 31.0 pg Final    MCHC 01/05/2022 31.4* 32.0 - 36.0 g/dL Final    RDW 01/05/2022 14.9* 11.5 - 14.5 % Final    Platelets 01/05/2022 187  150 - 450 K/uL Final    MPV 01/05/2022 8.9* 9.2 - 12.9 fL Final    Immature Granulocytes 01/05/2022 0.4  0.0 - 0.5 % Final    Gran # (ANC) 01/05/2022 3.5  1.8 - 7.7 K/uL Final    Immature Grans (Abs) 01/05/2022 0.02  0.00 - 0.04 K/uL Final    Lymph # 01/05/2022 0.9* 1.0 - 4.8 K/uL Final    Mono # 01/05/2022 0.5  0.3 - 1.0 K/uL Final    Eos # 01/05/2022 0.1  0.0 - 0.5 K/uL Final    Baso # 01/05/2022 0.01  0.00 - 0.20 K/uL Final    nRBC 01/05/2022 0  0 /100 WBC Final    Gran % 01/05/2022 69.9  38.0 - 73.0 % Final    Lymph % 01/05/2022 17.6* 18.0 - 48.0 % Final    Mono % 01/05/2022 10.1  4.0 - 15.0 % Final    Eosinophil % 01/05/2022 1.8  0.0 - 8.0 % Final    Basophil % 01/05/2022  0.2  0.0 - 1.9 % Final    Differential Method 01/05/2022 Automated   Final      ?   ?   Assessment/Plan:     Problem List Items Addressed This Visit        Renal/    CKD (chronic kidney disease) stage 4, GFR 15-29 ml/min     -Schedule follow-up with Dr. Yvette APARICIO            ID    C. difficile colitis     -Followed by PCP  -Continue Vancomycin         Cellulitis of axilla, left     -Followed by PCP  -Continue Zyvox and daily dressing changes            Hematology    Thrombocytopenia, unspecified     Improved  --Plts 187k/uL today  ..              Oncology    Ductal carcinoma in situ (DCIS) of left breast - Primary     -Pt has chosen not to receive any additional treatment for her breast cancer with chemotherapy  -Pt to f/u with primary oncologist-Dr. Collins- in two weeks         Relevant Orders    CBC Auto Differential    Comprehensive Metabolic Panel    Anemia     Improving  -Hgb 9.8 g/dLtoday               Follow-Up:     In 2 weeks with CBC, CMP and  Dr. Collins

## 2022-01-05 NOTE — PATIENT INSTRUCTIONS
Patient Education       Calcitonin (raghav si TOE juan ramon)   Brand Names: [place] Miacalcin   Brand Names: Claribel Calcimar   What is this drug used for?   · It is used to treat Paget's disease.  · It is used to treat high calcium levels.  · It is used to treat soft, brittle bones (osteoporosis) after menopause.  · It may be given to you for other reasons. Talk with the doctor.    What do I need to tell my doctor BEFORE I take this drug?   · If you are allergic to this drug; any part of this drug; or any other drugs, foods, or substances. Tell your doctor about the allergy and what signs you had.  · If you have low calcium levels.  This is not a list of all drugs or health problems that interact with this drug.  Tell your doctor and pharmacist about all of your drugs (prescription or OTC, natural products, vitamins) and health problems. You must check to make sure that it is safe for you to take this drug with all of your drugs and health problems. Do not start, stop, or change the dose of any drug without checking with your doctor.  What are some things I need to know or do while I take this drug?   All products:   · Tell all of your health care providers that you take this drug. This includes your doctors, nurses, pharmacists, and dentists.  · Have your blood work and other lab tests checked as you have been told by your doctor.  · This drug may add to the chance of getting some types of cancer. Talk with the doctor.  · Tell your doctor if you are pregnant, plan on getting pregnant, or are breast-feeding. You will need to talk about the benefits and risks to you and the baby.  Soft, brittle bones (osteoporosis):   · Take calcium and vitamin D as you were told by your doctor.  Nose spray:   · If you are 65 or older, use this drug with care. You could have more nose irritation.  What are some side effects that I need to call my doctor about right away?   WARNING/CAUTION: Even though it may be rare, some people may have very bad  and sometimes deadly side effects when taking a drug. Tell your doctor or get medical help right away if you have any of the following signs or symptoms that may be related to a very bad side effect:  All products:   · Signs of an allergic reaction, like rash; hives; itching; red, swollen, blistered, or peeling skin with or without fever; wheezing; tightness in the chest or throat; trouble breathing, swallowing, or talking; unusual hoarseness; or swelling of the mouth, face, lips, tongue, or throat. Rarely, some allergic reactions have been deadly.  · Signs of low calcium levels like muscle cramps or spasms, numbness and tingling, or seizures.  · Chest pain or pressure or a fast heartbeat.  · Dizziness or passing out.  Nose spray:   · Nose sores.  · Nosebleed.  What are some other side effects of this drug?   All drugs may cause side effects. However, many people have no side effects or only have minor side effects. Call your doctor or get medical help if any of these side effects or any other side effects bother you or do not go away:  Nose spray:   · Nose irritation.  · Runny nose.  · Back pain.  · Headache.  · Muscle pain.  Injection:   · Flushing.  · Upset stomach or throwing up.  · Irritation where the shot is given.  These are not all of the side effects that may occur. If you have questions about side effects, call your doctor. Call your doctor for medical advice about side effects.  You may report side effects to your national health agency.  You may report side effects to the FDA at 1-858.144.2827. You may also report side effects at https://www.fda.gov/medwatch.  How is this drug best taken?   Use this drug as ordered by your doctor. Read all information given to you. Follow all instructions closely.  Nose spray:   · For the nose only.  · Before first use, let it warm to room temperature. Do not heat.  · Prime pump before first use.  · Do not shake.  · Blow your nose before use.  · Change nostrils every  day.  Injection:   · It is given as a shot into the fatty part of the skin or a muscle.  · If you will be giving yourself the shot, your doctor or nurse will teach you how to give the shot.  · Wash your hands before and after use.  · Do not use if the solution is cloudy, leaking, or has particles.  · Do not use if solution changes color.  · Throw away needles in a needle/sharp disposal box. Do not reuse needles or other items. When the box is full, follow all local rules for getting rid of it. Talk with a doctor or pharmacist if you have any questions.  What do I do if I miss a dose?   · Use a missed dose as soon as you think about it.  · If it is close to the time for your next dose, skip the missed dose and go back to your normal time.  · Do not use 2 doses at the same time or extra doses.    How do I store and/or throw out this drug?   Nose spray:   · Store unopened nose spray bottle in a refrigerator until ready to use. Do not freeze.  · After opening, store upright at room temperature. Throw away any part not used after labeled number of doses is used or 35 days after opening.  Injection:   · Store in a refrigerator. Do not freeze.  All products:   · Keep all drugs in a safe place. Keep all drugs out of the reach of children and pets.  · Throw away unused or  drugs. Do not flush down a toilet or pour down a drain unless you are told to do so. Check with your pharmacist if you have questions about the best way to throw out drugs. There may be drug take-back programs in your area.  General drug facts   · If your symptoms or health problems do not get better or if they become worse, call your doctor.  · Do not share your drugs with others and do not take anyone else's drugs.  · Some drugs may have another patient information leaflet. If you have any questions about this drug, please talk with your doctor, nurse, pharmacist, or other health care provider.  · Some drugs may have another patient information  leaflet. Check with your pharmacist. If you have any questions about this drug, please talk with your doctor, nurse, pharmacist, or other health care provider.  · If you think there has been an overdose, call your poison control center or get medical care right away. Be ready to tell or show what was taken, how much, and when it happened.    Consumer Information Use and Disclaimer   This generalized information is a limited summary of diagnosis, treatment, and/or medication information. It is not meant to be comprehensive and should be used as a tool to help the user understand and/or assess potential diagnostic and treatment options. It does NOT include all information about conditions, treatments, medications, side effects, or risks that may apply to a specific patient. It is not intended to be medical advice or a substitute for the medical advice, diagnosis, or treatment of a health care provider based on the health care provider's examination and assessment of a patient's specific and unique circumstances. Patients must speak with a health care provider for complete information about their health, medical questions, and treatment options, including any risks or benefits regarding use of medications. This information does not endorse any treatments or medications as safe, effective, or approved for treating a specific patient. [employer] and its affiliates disclaim any warranty or liability relating to this information or the use thereof. The use of this information is governed by the Terms of Use, available at https://www.[employer].com/en/solutions/lexicomp/about/jayme.  Last Reviewed Date   2021-07-07  Copyright   © 2021 [employer] and its affiliates and/or licensors. All rights reserved.  Patient Education       Osteoporosis Discharge Instructions   About this topic   Osteoporosis is a health problem where bones become weak and fragile. All through life, old bone is taken away by the body and new  bone is added. Before age 30, more bone is added than taken away. This builds stronger, heavier bones. After age 30, more bone is taken away than is made. This can cause bones to become weak. Then, they are more likely to break.  If your bones are just starting to weaken, it may be called osteopenia. The bones are less dense than they should be. This is not as bad as osteoporosis. Both of these conditions often happen without any signs. About half of all women older than 50 and about a fourth of men over 50 will break a bone because of osteoporosis.  What care is needed at home?   · Ask your doctor what you need to do when you go home. Make sure you ask questions if you do not understand what the doctor says. This way you will know what you need to do.  · Take your drugs as ordered by your doctor.  · Make changes to your diet that will help lessen more bone loss.  · Do weight-bearing exercise each day.  What follow-up care is needed?   · Your doctor may ask you to make visits to the office to check on your progress. Be sure to keep these visits.  · Your doctor may send you to physical therapy (PT) to help you learn exercises for balance and safe walking.  · Your doctor may send you to rehab after a bone break.  What drugs may be needed?   The doctor may order drugs to:  · Prevent bone loss  · Build up your bones  · Give you extra vitamins and minerals  · Balance hormones  Will physical activity be limited?   You should avoid activities that put you at a high risk for breaking a bone. Talk to your doctor about the right amount of activity for you.  What changes to diet are needed?   · Eat a diet rich in calcium and vitamin D. Good sources of calcium are low-fat dairy products, dark green leafy vegetables, canned salmon or sardines, tofu, and calcium-fortified orange juice and cereals.  · Avoid high protein diets. Protein is important but too much can cause bone loss.  · Limit caffeine. Moderate amounts of coffee and  tea are fine. Avoid carbonated cola drinks as studies show drinking these puts you at greater risk for bone loss.  What problems could happen?   · Broken bones  · Slumped posture  · Loss of height  · Poor bone healing  · Loss of motion  What can be done to prevent this health problem?   · Stay active and work out to keep your muscles strong and flexible.  · Do weight-bearing exercises to build strong bones.  · Keep your home clutter-free to lessen your chances of falling. Keep the house well-lit. Use shower mats to avoid slipping in a wet shower.  · If you have balance problems, use a cane or walker for safe walking.  · Be extra careful in winter weather to avoid slipping on ice.  When do I need to call the doctor?   · Sudden back pain with or without numbness, tingling, or muscle weakness in the arms and legs. This could be a sign of a bone break in a spinal bone. A spinal bone break can happen even without falling.  · You are not feeling better in 2 to 3 days or you are feeling worse  Teach Back: Helping You Understand   The Teach Back Method helps you understand the information we are giving you. After you talk with the staff, tell them in your own words what you learned. This helps to make sure the staff has described each thing clearly. It also helps to explain things that may have been confusing. Before going home, make sure you can do these:  · I can tell you about my condition.  · I can tell you what changes I need to make with my diet, drugs, or activities.  · I can tell you what I will do if I have sudden back pain.  Where can I learn more?   American Academy of Family Physicians  https://familydoctor.org/condition/osteoporosis/   National Osteoporosis Foundation  http://www.nof.org/learn/basics   [facility] Osteoporosis and Related Bone Diseases National Resource Center  http://www.niams.nih.gov/Health_Info/Bone/Osteoporosis/overview.asp   Last Reviewed Date   2020-08-26  Consumer Information Use and Disclaimer    This information is not specific medical advice and does not replace information you receive from your health care provider. This is only a brief summary of general information. It does NOT include all information about conditions, illnesses, injuries, tests, procedures, treatments, therapies, discharge instructions or life-style choices that may apply to you. You must talk with your health care provider for complete information about your health and treatment options. This information should not be used to decide whether or not to accept your health care providers advice, instructions or recommendations. Only your health care provider has the knowledge and training to provide advice that is right for you.  Copyright   Copyright © 2021 [employer] and its affiliates and/or licensors. All rights reserved.

## 2022-01-06 ENCOUNTER — CLINICAL SUPPORT (OUTPATIENT)
Dept: INTERNAL MEDICINE | Facility: CLINIC | Age: 67
End: 2022-01-06
Payer: MEDICARE

## 2022-01-06 ENCOUNTER — TELEPHONE (OUTPATIENT)
Dept: NEPHROLOGY | Facility: CLINIC | Age: 67
End: 2022-01-06
Payer: MEDICARE

## 2022-01-06 ENCOUNTER — TELEPHONE (OUTPATIENT)
Dept: SURGERY | Facility: CLINIC | Age: 67
End: 2022-01-06
Payer: MEDICARE

## 2022-01-06 ENCOUNTER — TELEPHONE (OUTPATIENT)
Dept: HEMATOLOGY/ONCOLOGY | Facility: CLINIC | Age: 67
End: 2022-01-06
Payer: MEDICARE

## 2022-01-06 ENCOUNTER — OFFICE VISIT (OUTPATIENT)
Dept: HEMATOLOGY/ONCOLOGY | Facility: CLINIC | Age: 67
End: 2022-01-06
Payer: MEDICARE

## 2022-01-06 ENCOUNTER — DOCUMENT SCAN (OUTPATIENT)
Dept: HOME HEALTH SERVICES | Facility: HOSPITAL | Age: 67
End: 2022-01-06
Payer: MEDICARE

## 2022-01-06 ENCOUNTER — TELEPHONE (OUTPATIENT)
Dept: PRIMARY CARE CLINIC | Facility: CLINIC | Age: 67
End: 2022-01-06
Payer: MEDICARE

## 2022-01-06 ENCOUNTER — OFFICE VISIT (OUTPATIENT)
Dept: PRIMARY CARE CLINIC | Facility: CLINIC | Age: 67
End: 2022-01-06
Payer: MEDICARE

## 2022-01-06 VITALS
BODY MASS INDEX: 30.59 KG/M2 | DIASTOLIC BLOOD PRESSURE: 79 MMHG | OXYGEN SATURATION: 98 % | WEIGHT: 166.25 LBS | HEIGHT: 62 IN | TEMPERATURE: 97 F | HEART RATE: 114 BPM | SYSTOLIC BLOOD PRESSURE: 122 MMHG

## 2022-01-06 DIAGNOSIS — D05.12 DUCTAL CARCINOMA IN SITU (DCIS) OF LEFT BREAST: Primary | ICD-10-CM

## 2022-01-06 DIAGNOSIS — A04.72 C. DIFFICILE COLITIS: ICD-10-CM

## 2022-01-06 DIAGNOSIS — D69.6 THROMBOCYTOPENIA, UNSPECIFIED: ICD-10-CM

## 2022-01-06 DIAGNOSIS — D84.9 IMMUNOCOMPROMISED PATIENT: ICD-10-CM

## 2022-01-06 DIAGNOSIS — N18.4 CKD (CHRONIC KIDNEY DISEASE) STAGE 4, GFR 15-29 ML/MIN: ICD-10-CM

## 2022-01-06 DIAGNOSIS — N18.4 ANEMIA DUE TO STAGE 4 CHRONIC KIDNEY DISEASE: ICD-10-CM

## 2022-01-06 DIAGNOSIS — L03.112 CELLULITIS OF AXILLA, LEFT: ICD-10-CM

## 2022-01-06 DIAGNOSIS — D63.1 ANEMIA DUE TO STAGE 4 CHRONIC KIDNEY DISEASE: ICD-10-CM

## 2022-01-06 DIAGNOSIS — L76.82 PAIN AT SURGICAL INCISION: Primary | ICD-10-CM

## 2022-01-06 PROCEDURE — 3008F PR BODY MASS INDEX (BMI) DOCUMENTED: ICD-10-PCS | Mod: HCNC,CPTII,S$GLB,

## 2022-01-06 PROCEDURE — 99999 PR PBB SHADOW E&M-EST. PATIENT-LVL I: ICD-10-PCS | Mod: PBBFAC,HCNC,,

## 2022-01-06 PROCEDURE — 99214 OFFICE O/P EST MOD 30 MIN: CPT | Mod: HCNC,S$GLB,,

## 2022-01-06 PROCEDURE — 99214 PR OFFICE/OUTPT VISIT, EST, LEVL IV, 30-39 MIN: ICD-10-PCS | Mod: HCNC,S$GLB,,

## 2022-01-06 PROCEDURE — 1125F AMNT PAIN NOTED PAIN PRSNT: CPT | Mod: HCNC,CPTII,S$GLB,

## 2022-01-06 PROCEDURE — 99499 RISK ADDL DX/OHS AUDIT: ICD-10-PCS | Mod: HCNC,S$GLB,,

## 2022-01-06 PROCEDURE — 1157F PR ADVANCE CARE PLAN OR EQUIV PRESENT IN MEDICAL RECORD: ICD-10-PCS | Mod: HCNC,CPTII,S$GLB, | Performed by: NURSE PRACTITIONER

## 2022-01-06 PROCEDURE — 1157F ADVNC CARE PLAN IN RCRD: CPT | Mod: HCNC,CPTII,S$GLB, | Performed by: NURSE PRACTITIONER

## 2022-01-06 PROCEDURE — 3078F PR MOST RECENT DIASTOLIC BLOOD PRESSURE < 80 MM HG: ICD-10-PCS | Mod: HCNC,CPTII,S$GLB,

## 2022-01-06 PROCEDURE — 99214 PR OFFICE/OUTPT VISIT, EST, LEVL IV, 30-39 MIN: ICD-10-PCS | Mod: HCNC,S$GLB,, | Performed by: NURSE PRACTITIONER

## 2022-01-06 PROCEDURE — 99214 OFFICE O/P EST MOD 30 MIN: CPT | Mod: HCNC,S$GLB,, | Performed by: NURSE PRACTITIONER

## 2022-01-06 PROCEDURE — 3074F SYST BP LT 130 MM HG: CPT | Mod: HCNC,CPTII,S$GLB,

## 2022-01-06 PROCEDURE — 3066F PR DOCUMENTATION OF TREATMENT FOR NEPHROPATHY: ICD-10-PCS | Mod: HCNC,CPTII,S$GLB, | Performed by: NURSE PRACTITIONER

## 2022-01-06 PROCEDURE — 1157F ADVNC CARE PLAN IN RCRD: CPT | Mod: HCNC,CPTII,S$GLB,

## 2022-01-06 PROCEDURE — 1160F RVW MEDS BY RX/DR IN RCRD: CPT | Mod: HCNC,CPTII,S$GLB,

## 2022-01-06 PROCEDURE — 1160F PR REVIEW ALL MEDS BY PRESCRIBER/CLIN PHARMACIST DOCUMENTED: ICD-10-PCS | Mod: HCNC,CPTII,S$GLB,

## 2022-01-06 PROCEDURE — 3288F PR FALLS RISK ASSESSMENT DOCUMENTED: ICD-10-PCS | Mod: HCNC,CPTII,S$GLB,

## 2022-01-06 PROCEDURE — 3078F DIAST BP <80 MM HG: CPT | Mod: HCNC,CPTII,S$GLB,

## 2022-01-06 PROCEDURE — 3074F PR MOST RECENT SYSTOLIC BLOOD PRESSURE < 130 MM HG: ICD-10-PCS | Mod: HCNC,CPTII,S$GLB,

## 2022-01-06 PROCEDURE — 1101F PT FALLS ASSESS-DOCD LE1/YR: CPT | Mod: HCNC,CPTII,S$GLB,

## 2022-01-06 PROCEDURE — 3066F NEPHROPATHY DOC TX: CPT | Mod: HCNC,CPTII,S$GLB, | Performed by: NURSE PRACTITIONER

## 2022-01-06 PROCEDURE — 99999 PR PBB SHADOW E&M-EST. PATIENT-LVL V: ICD-10-PCS | Mod: PBBFAC,HCNC,,

## 2022-01-06 PROCEDURE — 99499 UNLISTED E&M SERVICE: CPT | Mod: HCNC,S$GLB,,

## 2022-01-06 PROCEDURE — 99999 PR PBB SHADOW E&M-EST. PATIENT-LVL I: CPT | Mod: PBBFAC,HCNC,,

## 2022-01-06 PROCEDURE — 1159F PR MEDICATION LIST DOCUMENTED IN MEDICAL RECORD: ICD-10-PCS | Mod: HCNC,CPTII,S$GLB,

## 2022-01-06 PROCEDURE — 1157F PR ADVANCE CARE PLAN OR EQUIV PRESENT IN MEDICAL RECORD: ICD-10-PCS | Mod: HCNC,CPTII,S$GLB,

## 2022-01-06 PROCEDURE — 1159F MED LIST DOCD IN RCRD: CPT | Mod: HCNC,CPTII,S$GLB,

## 2022-01-06 PROCEDURE — 1125F PR PAIN SEVERITY QUANTIFIED, PAIN PRESENT: ICD-10-PCS | Mod: HCNC,CPTII,S$GLB,

## 2022-01-06 PROCEDURE — 1101F PR PT FALLS ASSESS DOC 0-1 FALLS W/OUT INJ PAST YR: ICD-10-PCS | Mod: HCNC,CPTII,S$GLB,

## 2022-01-06 PROCEDURE — 3008F BODY MASS INDEX DOCD: CPT | Mod: HCNC,CPTII,S$GLB,

## 2022-01-06 PROCEDURE — 3288F FALL RISK ASSESSMENT DOCD: CPT | Mod: HCNC,CPTII,S$GLB,

## 2022-01-06 PROCEDURE — 99999 PR PBB SHADOW E&M-EST. PATIENT-LVL V: CPT | Mod: PBBFAC,HCNC,,

## 2022-01-06 RX ORDER — HYDROCODONE BITARTRATE AND ACETAMINOPHEN 7.5; 325 MG/1; MG/1
1 TABLET ORAL EVERY 6 HOURS PRN
Qty: 28 TABLET | Refills: 0 | Status: SHIPPED | OUTPATIENT
Start: 2022-01-06 | End: 2022-01-13

## 2022-01-06 NOTE — TELEPHONE ENCOUNTER
Patient called in wanting to be scheduled for her wound dressing change. Patient was scheduled & verbally understood the appointment information.

## 2022-01-06 NOTE — TELEPHONE ENCOUNTER
----- Message from Mary Mccarthy LPN sent at 1/6/2022  2:14 PM CST -----  Ms. Salas ROUSSEAU would like this patient seen ASAP for stage 4 renal disease. Please contact patient for appointment.   Thanks a bunch!  Mary

## 2022-01-06 NOTE — TELEPHONE ENCOUNTER
Spoke with patient and let her know that NP wanted her to come back in 2 wks instead of a month. I instructed her to check her MyChart for the new dates and times. She acknowledged. Call ended well.

## 2022-01-06 NOTE — TELEPHONE ENCOUNTER
----- Message from Sarah Sesay sent at 1/6/2022  9:12 AM CST -----  Contact: [phone]  Patient is calling in requesting a call back she is wanting to come in she has a wound that has not be changed in 4 days. She is wanting to come in today. Please call her back at [phone]  Thanks/ln

## 2022-01-06 NOTE — PROGRESS NOTES
Nadia Damon  01/31/2022  [id number]    Luz Dickson NP  Patient Care Team:  Luz Dickson NP as PCP - General (Family Medicine)  Miguel Soni Jr., MD as Consulting Physician (Vascular Surgery)  Ike King MD as Consulting Physician (Cardiology)  Courtney Tubbs MD as Consulting Physician (Cardiology)  Carter Crawford MD as Consulting Physician (Nephrology)  Paxton Vasques OD as Consulting Physician (Optometry)  PRANAY Villalobos MD as Obstetrician (Obstetrics)  Parker Mccarthy IV, MD (Inactive) (Urology)  Ike King MD as Consulting Physician (Cardiology)  Jose Roland MD as Consulting Physician (Dermatology)  Prasanth Johnson MD as Consulting Physician (Rheumatology)  Ayanna Hart, RN as Oncology Navigator  Nora Morin LMSW as   Ray Baker, RN as Outpatient         [facility] Primary Care Note      Chief Complaint:  No chief complaint on file.      History of Present Illness:  HPI    F/U Left axilla purulent cellulitis.   Still discomfort to area, decreased with Norco.   New painful nodular area superior to incision site.   Noted today by pt.    Still taking PO vancomycin and PO Zyvox.  No further fever/chills. Some nausea, no vomiting, poor appetite.   Last diarrhea episode 4 days ago.         Review of Systems   Constitutional: Positive for malaise/fatigue. Negative for chills and fever.   Respiratory: Negative for shortness of breath.    Gastrointestinal: Positive for nausea. Negative for abdominal pain, constipation, diarrhea and vomiting.   Genitourinary: Negative for dysuria.   Musculoskeletal: Negative for myalgias.         The following were reviewed: Active problem list, medication list, allergies, family history, social history, and Health Maintenance.     History:  Past Medical History:   Diagnosis Date    Abdominal wall hernia     CT Renal 6/11/2018---Small fat containing superior ventral abdominal wall hernia at the epicardial pacing  lead site.    Anxiety     Arthritis     ZEN HIPS    Breast cancer in female 08/2021    LEFT BREAST    Cellulitis of axilla, left 12/23/2021    Chronic diastolic heart failure 12/16/2021    Chronic kidney disease     stage 4, GFR 15-29 ml/min    Chronic midline low back pain without sciatica 6/18/2018    Coronary artery disease 1993    heart transplant    Depression     Fibromyalgia     on Lyrica    Heart failure     native heart cardiomyopathy    Heart transplanted 1993    due to cardiomyopathy    History of hyperparathyroidism; Hyperparathyroidism, secondary renal     PT DENIES    Hypertension     Immune disorder     anti rejection meds    Iron deficiency anemia 8/15/2017    Kidney stones     passed per pt    Obesity     Other osteoporosis without current pathological fracture 8/30/2019    Shingles 2003 approx    left leg    Trouble in sleeping     Urinary incontinence      Past Surgical History:   Procedure Laterality Date    BLADDER SURGERY  2015 approx    mesh - Dr Everett then 2nd reconstructive sx Dr Onofre    BREAST BIOPSY Bilateral     NEGATIVE    BREAST SURGERY Left 9/28/15    Bx - benign    BREAST SURGERY Right 12/2015    Bx benign    CARDIAC PACEMAKER REMOVAL Left 06/26/2014    Pacer defirillator removed. Put in 1993 aat time of heart transplant    CARPAL TUNNEL RELEASE Left 03/03/15    Dr. Hall    COLONOSCOPY N/A 2/25/2021    Procedure: COLONOSCOPY;  Surgeon: Freida Ramirez MD;  Location: [facility] ENDO;  Service: Endoscopy;  Laterality: N/A;    HEART TRANSPLANT  1993    HERNIA REPAIR Right 1971 approx    Inguinal    HYSTERECTOMY  1983    vag hyst /LSO     INCISION AND DRAINAGE OF ABSCESS Left 12/24/2021    Procedure: INCISION AND DRAINAGE, ABSCESS;  Surgeon: Joseph Longo MD;  Location: [facility] OR;  Service: General;  Laterality: Left;    INSERTION OF TUNNELED CENTRAL VENOUS CATHETER (CVC) WITH SUBCUTANEOUS PORT N/A 11/9/2021    Procedure: [id number];   Surgeon: Christoph Douglas MD;  Location[facility];  Service: General;  Laterality: N/A;    SENTINEL LYMPH NODE BIOPSY Left 10/12/2021    Procedure: BIOPSY, LYMPH NODE, SENTINEL;  Surgeon: Christoph Douglas MD;  Location[facility] OR;  Service: General;  Laterality: Left;    TOE SURGERY       Family History   Problem Relation Age of Onset    Cancer Mother 38        breast    Breast cancer Mother     Heart disease Maternal Grandmother     Cataracts Cousin     Hypertension Son     Diabetes Neg Hx     Stroke Neg Hx     Kidney disease Neg Hx     Asthma Neg Hx     COPD Neg Hx     Melanoma Neg Hx     Hyperlipidemia Neg Hx      Social History     Socioeconomic History    Marital status: Single    Number of children: 2    Highest education level: 11th grade   Occupational History    Occupation: Retired   Tobacco Use    Smoking status: Never Smoker    Smokeless tobacco: Never Used   Substance and Sexual Activity    Alcohol use: Never     Alcohol/week: 0.0 standard drinks    Drug use: No    Sexual activity: Not Currently     Partners: Male     Birth control/protection: See Surgical Hx   Other Topics Concern    Are you pregnant or think you may be? No    Breast-feeding No   Social History Narrative    Single. 2 children , 1  at 30 yoa   strep throat -  pneumonia and renal complications after not completing course of AB. Other child lives in [place], [place]. Has a cousin locally that could help in an emergency. 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 Caffeine intake =- 1 cola a day. No coffee, + occasional tea, avoids caffeine especially at night. Still drives. She does not have a Living Will or Advanced directive.      Social Determinants of Health     Financial Resource Strain: Low Risk     Difficulty of Paying Living Expenses: Not hard at all   Food Insecurity: No Food Insecurity    Worried About Running Out of Food in the Last Year: Never true    Ran Out of Food in the  Last Year: Never true   Transportation Needs: No Transportation Needs    Lack of Transportation (Medical): No    Lack of Transportation (Non-Medical): No   Physical Activity: Insufficiently Active    Days of Exercise per Week: 2 days    Minutes of Exercise per Session: 10 min   Stress: No Stress Concern Present    Feeling of Stress : Not at all   Social Connections: Socially Isolated    Frequency of Communication with Friends and Family: Once a week    Frequency of Social Gatherings with Friends and Family: Once a week    Attends Taoist Services: More than 4 times per year    Active Member of Clubs or Organizations: No    Attends Club or Organization Meetings: Never    Marital Status: Never    Housing Stability: Low Risk     Unable to Pay for Housing in the Last Year: No    Number of Places Lived in the Last Year: 1    Unstable Housing in the Last Year: No     Patient Active Problem List   Diagnosis    Heart transplanted    Anxiety    Insomnia    CKD (chronic kidney disease) stage 4, GFR 15-29 ml/min    Immunosuppression due to drug therapy    Fibromyalgia    Gastroesophageal reflux disease without esophagitis    Breast screening    Immunization deficiency    Essential hypertension    Aortic atherosclerosis    Chronic major depressive disorder, recurrent episode    Iron deficiency anemia    Vaginal atrophy    Hyperparathyroidism    Hx of falling    Chronic midline low back pain without sciatica    Closed nondisplaced fracture of distal phalanx of left great toe with routine healing    Lightheadedness    Medication side effects    Obesity (BMI 30.0-34.9)    Edema    Asymptomatic menopausal state     Other osteoporosis without current pathological fracture    Cough    Abnormal CT scan, kidney    Weakness of both lower extremities    Immunocompromised patient    Osteoporosis, post-menopausal    Ductal carcinoma in situ (DCIS) of left breast    Immunodeficiency  secondary to radiation therapy    Abnormal mammogram    The hangs, uncomplicated    Severe sepsis    GRACE (acute kidney injury)    Diarrhea    Drug-induced pancytopenia    Thrombocytopenia, unspecified    Immunodeficiency due to chemotherapy    C. difficile colitis    Strain of left shoulder    Left shoulder pain    Ulnar neuropathy of left upper extremity    Anemia associated with stage 4 chronic renal failure    Secondary and unspecified malignant neoplasm of axilla and upper limb lymph nodes     Review of patient's allergies indicates:   Allergen Reactions    Lisinopril Swelling and Rash    Augmentin [amoxicillin-pot clavulanate] Diarrhea       Medications:  Current Outpatient Medications on File Prior to Visit   Medication Sig Dispense Refill    acetaminophen (TYLENOL) 325 MG tablet Take 1 tablet (325 mg total) by mouth every 6 (six) hours as needed for Pain.  0    aspirin (ECOTRIN) 81 MG EC tablet Take 1 tablet (81 mg total) by mouth once daily. 90 tablet 3    biotin 10,000 mcg Cap Take 1 tablet by mouth once daily.      busPIRone (BUSPAR) 10 MG tablet Take 1 tablet (10 mg total) by mouth once daily. 90 tablet 3    calcitonin, salmon, (FORTICAL) 200 unit/actuation nasal spray 1 spray by Nasal route once daily. 3.7 mL 3    cycloSPORINE modified, NEORAL, (NEORAL) 25 MG capsule Take 3 capsules (75 mg total) by mouth 2 (two) times daily. 270 capsule 11    DULoxetine (CYMBALTA) 30 MG capsule TAKE 1 CAPSULE EVERY DAY 90 capsule 1    EVENING PRIMROSE OIL ORAL Take 1,000 mg by mouth once daily.      ferrous sulfate (FEOSOL) 325 mg (65 mg iron) Tab tablet Take 1 tablet (325 mg total) by mouth once daily. 100 tablet 3    furosemide (LASIX) 20 MG tablet Take 1 tablet (20 mg total) by mouth 2 (two) times a day for 5 days, THEN 1 tablet (20 mg total) once daily. Take 1 tablet daily. 370 tablet 0    hydrALAZINE (APRESOLINE) 50 MG tablet TAKE 2 TABLETS  EVERY 8  HOURS. 540 tablet 3    LIDOCAINE  VISCOUS 2 % solution SWISH AND SPIT 10 MLS EVERY 4 (FOUR) HOURS AS NEEDED (MOUTH&/OR THROAT PAIN). FOR MOUTH SORES 450 each 11    LIDOcaine-prilocaine (EMLA) cream Apply topically as needed. 30 g 2    losartan (COZAAR) 25 MG tablet Take 1 tablet (25 mg total) by mouth once daily. 90 tablet 3    multivitamin capsule Take 1 capsule by mouth once daily.      ondansetron (ZOFRAN-ODT) 8 MG TbDL Take 1 tablet (8 mg total) by mouth every 8 (eight) hours as needed (nausea). 60 tablet 5    pantoprazole (PROTONIX) 40 MG tablet Take 1 tablet (40 mg total) by mouth once daily. 90 tablet 1    pregabalin (LYRICA) 50 MG capsule Take 1 capsule (50 mg total) by mouth 2 (two) times daily. NOON & NIGHT TIME 180 capsule 1    temazepam (RESTORIL) 30 mg capsule Take 1 at bedtime 90 capsule 1    vitamin E 400 UNIT capsule Take 400 Units by mouth once daily.      zolpidem (AMBIEN) 10 mg Tab TAKE 1 TABLET BY MOUTH ONCE DAILY IN THE EVENING 90 tablet 1     Current Facility-Administered Medications on File Prior to Visit   Medication Dose Route Frequency Provider Last Rate Last Admin    denosumab (PROLIA) injection 60 mg  60 mg Subcutaneous Q6 Months Prasanth Johnson MD        lactated ringers infusion   Intravenous Continuous Jennifer Carr MD 10 mL/hr at 11/09/21 0717 Restarted at 11/09/21 0820       Medications have been reviewed and reconciled with patient at visit today.    Barriers to medications present (no )    Adverse reactions to current medications (no)      Exam:  There were no vitals filed for this visit.      There is no height or weight on file to calculate BMI.      BP Readings from Last 3 Encounters:   01/25/22 (!) 140/84   01/24/22 137/89   01/20/22 (!) 139/90     Wt Readings from Last 3 Encounters:   01/25/22 1156 74.3 kg (163 lb 12.8 oz)   01/24/22 1113 74.3 kg (163 lb 12.8 oz)   01/20/22 1420 76.8 kg (169 lb 5 oz)            Physical Exam  Constitutional:       Appearance: She is ill-appearing (chronically).    HENT:      Mouth/Throat:      Mouth: Mucous membranes are moist.   Cardiovascular:      Rate and Rhythm: Normal rate and regular rhythm.   Pulmonary:      Effort: Pulmonary effort is normal.   Chest:       Skin:     General: Skin is warm and dry.   Neurological:      Mental Status: She is alert.         Laboratory Reviewed: (Yes)  Lab Results   Component Value Date    WBC 4.40 01/20/2022    HGB 7.2 (L) 01/20/2022    HCT 22.5 (L) 01/20/2022     01/20/2022    CHOL 157 12/24/2021    TRIG 120 12/24/2021    HDL 42 12/24/2021    ALT 28 01/20/2022    AST 39 01/20/2022     01/20/2022    K 4.7 01/20/2022     01/20/2022    CREATININE 2.6 (H) 01/20/2022    BUN 32 (H) 01/20/2022    CO2 24 01/20/2022    TSH 5.158 (H) 12/24/2021    PSA <0.1 05/27/2008    INR 1.0 11/22/2021    HGBA1C 5.2 12/24/2021           Health Maintenance  Health Maintenance Topics with due status: Not Due       Topic Last Completion Date    Pneumococcal Vaccines (Age 65+) 10/22/2018    TETANUS VACCINE 09/09/2020    Colorectal Cancer Screening 02/25/2021    Mammogram 07/19/2021    DEXA SCAN 08/03/2021    Lipid Panel 12/24/2021     There are no preventive care reminders to display for this patient.    Assessment:  Problem List Items Addressed This Visit        Immunology/Multi System    Immunocompromised patient     Now with cellulitis sx left axilla, some slough to site.   Message to Dr Douglas.  Plans to see surgeon tomorrow.   Continue abx.           Other Visit Diagnoses     Pain at surgical incision    -  Primary            Plan:  Pain at surgical incision  -     HYDROcodone-acetaminophen (NORCO) 7.5-325 mg per tablet; Take 1 tablet by mouth every 6 (six) hours as needed for Pain.  Dispense: 28 tablet; Refill: 0    Immunocompromised patient      -Patient's lab results were reviewed and discussed with patient  -Treatment options and alternatives were discussed with the patient. Patient expressed understanding. Patient was given the  opportunity to ask questions and be an active participant in their medical care. Patient had no further questions or concerns at this time.   -Documentation of patient's health and condition was obtained from family member who was present during visit.  -Patient is an overall moderate risk for health complications from their medical conditions.       Follow up: Follow up in about 2 days (around 1/8/2022).      After visit summary printed and given to patient upon discharge.  Patient goals and care plan are included in After visit summary.    Total medical decision making time was 38 min.  The following issues were discussed: The primary encounter diagnosis was Pain at surgical incision. A diagnosis of Immunocompromised patient was also pertinent to this visit.    Health maintenance needs, recent test results and goals of care discussed with pt and questions answered.

## 2022-01-06 NOTE — ASSESSMENT & PLAN NOTE
-Pt has chosen not to receive any additional treatment for her breast cancer with chemotherapy  -Pt to f/u with primary oncologist-Dr. Collins- in two weeks

## 2022-01-07 ENCOUNTER — OFFICE VISIT (OUTPATIENT)
Dept: SURGERY | Facility: CLINIC | Age: 67
End: 2022-01-07
Payer: MEDICARE

## 2022-01-07 VITALS
TEMPERATURE: 98 F | SYSTOLIC BLOOD PRESSURE: 138 MMHG | HEART RATE: 110 BPM | DIASTOLIC BLOOD PRESSURE: 87 MMHG | BODY MASS INDEX: 30.28 KG/M2 | WEIGHT: 165.56 LBS

## 2022-01-07 DIAGNOSIS — C50.912 DUCTAL CARCINOMA IN SITU OF BREAST WITH MICROINVASIVE COMPONENT, LEFT: Primary | ICD-10-CM

## 2022-01-07 PROCEDURE — 99999 PR PBB SHADOW E&M-EST. PATIENT-LVL III: ICD-10-PCS | Mod: PBBFAC,HCNC,, | Performed by: SURGERY

## 2022-01-07 PROCEDURE — 99499 UNLISTED E&M SERVICE: CPT | Mod: S$GLB,,, | Performed by: SURGERY

## 2022-01-07 PROCEDURE — 1160F PR REVIEW ALL MEDS BY PRESCRIBER/CLIN PHARMACIST DOCUMENTED: ICD-10-PCS | Mod: HCNC,CPTII,S$GLB, | Performed by: SURGERY

## 2022-01-07 PROCEDURE — 99499 RISK ADDL DX/OHS AUDIT: ICD-10-PCS | Mod: S$GLB,,, | Performed by: SURGERY

## 2022-01-07 PROCEDURE — 3008F PR BODY MASS INDEX (BMI) DOCUMENTED: ICD-10-PCS | Mod: HCNC,CPTII,S$GLB, | Performed by: SURGERY

## 2022-01-07 PROCEDURE — 3079F DIAST BP 80-89 MM HG: CPT | Mod: HCNC,CPTII,S$GLB, | Performed by: SURGERY

## 2022-01-07 PROCEDURE — 1159F PR MEDICATION LIST DOCUMENTED IN MEDICAL RECORD: ICD-10-PCS | Mod: HCNC,CPTII,S$GLB, | Performed by: SURGERY

## 2022-01-07 PROCEDURE — 3075F SYST BP GE 130 - 139MM HG: CPT | Mod: HCNC,CPTII,S$GLB, | Performed by: SURGERY

## 2022-01-07 PROCEDURE — 1157F ADVNC CARE PLAN IN RCRD: CPT | Mod: HCNC,CPTII,S$GLB, | Performed by: SURGERY

## 2022-01-07 PROCEDURE — 1159F MED LIST DOCD IN RCRD: CPT | Mod: HCNC,CPTII,S$GLB, | Performed by: SURGERY

## 2022-01-07 PROCEDURE — 1125F AMNT PAIN NOTED PAIN PRSNT: CPT | Mod: HCNC,CPTII,S$GLB, | Performed by: SURGERY

## 2022-01-07 PROCEDURE — 99024 POSTOP FOLLOW-UP VISIT: CPT | Mod: HCNC,S$GLB,, | Performed by: SURGERY

## 2022-01-07 PROCEDURE — 1157F PR ADVANCE CARE PLAN OR EQUIV PRESENT IN MEDICAL RECORD: ICD-10-PCS | Mod: HCNC,CPTII,S$GLB, | Performed by: SURGERY

## 2022-01-07 PROCEDURE — 1160F RVW MEDS BY RX/DR IN RCRD: CPT | Mod: HCNC,CPTII,S$GLB, | Performed by: SURGERY

## 2022-01-07 PROCEDURE — 3008F BODY MASS INDEX DOCD: CPT | Mod: HCNC,CPTII,S$GLB, | Performed by: SURGERY

## 2022-01-07 PROCEDURE — 99999 PR PBB SHADOW E&M-EST. PATIENT-LVL III: CPT | Mod: PBBFAC,HCNC,, | Performed by: SURGERY

## 2022-01-07 PROCEDURE — 3075F PR MOST RECENT SYSTOLIC BLOOD PRESS GE 130-139MM HG: ICD-10-PCS | Mod: HCNC,CPTII,S$GLB, | Performed by: SURGERY

## 2022-01-07 PROCEDURE — 1125F PR PAIN SEVERITY QUANTIFIED, PAIN PRESENT: ICD-10-PCS | Mod: HCNC,CPTII,S$GLB, | Performed by: SURGERY

## 2022-01-07 PROCEDURE — 99024 PR POST-OP FOLLOW-UP VISIT: ICD-10-PCS | Mod: HCNC,S$GLB,, | Performed by: SURGERY

## 2022-01-07 PROCEDURE — 3079F PR MOST RECENT DIASTOLIC BLOOD PRESSURE 80-89 MM HG: ICD-10-PCS | Mod: HCNC,CPTII,S$GLB, | Performed by: SURGERY

## 2022-01-07 NOTE — PROGRESS NOTES
Patient ID: Nadia Damon is a 66 y.o. female.    Chief Complaint: breast cancer    HPI: 67 y/o female status post left breast lumpectomy 09/10/2021 s/p left breast  SLNbx 10/12/21 status post left axillary incision and drainage by Dr. Longo 12/24/2021 presents for wound check. She reports some discomfort in axilla away from wound. No drainage. Current I&D site getting smaller    presents for postop. She is doing well with no complaints.    presents to discuss Left breast DCIS Er-/WI-. Recently underwent stereotactic core biopsy following abnormal mammogram. Denies any palpable mass.     Per Kristine Danny:   Patient presents for the evaluation of an abnormal left breast mammogram    Pt heart transplant patient 27 years.     Risk factors identified:     Menarche at 14 y/o  G 2 P 2  First pregnancy at 16  LMP: partial hyst - 1984  Estrogen:none  Radiation to the neck or chest wall- none  Prior breast biopsies or atypical hyperplasia- right breast excisional biopsy- benign- left core biopsy benign in past       FH: mother breast cancer at 40's.     Body mass index is 30.28 kg/m².    Review of Systems   Constitutional: Negative.    HENT: Negative.    Eyes: Negative.    Respiratory: Negative.    Cardiovascular: Negative.    Gastrointestinal: Negative.         No reflux   Endocrine: Negative.    Genitourinary: Negative.    Musculoskeletal: Negative.    Skin: Negative.    Allergic/Immunologic: Negative.    Neurological: Negative.    Hematological: Negative.  Negative for adenopathy.   Psychiatric/Behavioral: Negative.    Breast: Pt denies any breast pain, has left lateral chest wall tenderness after last biopsy, nipple discharge, or palpable mass. No prior trauma or bruising. No breast surgeries or abnormalities.      Current Outpatient Medications   Medication Sig Dispense Refill    acetaminophen (TYLENOL) 325 MG tablet Take 1 tablet (325 mg total) by mouth every 6 (six) hours as needed for Pain.  0     amLODIPine (NORVASC) 2.5 MG tablet Take 1 tablet (2.5 mg total) by mouth once daily. 90 tablet 3    aspirin (ECOTRIN) 81 MG EC tablet Take 1 tablet (81 mg total) by mouth once daily. 90 tablet 3    baclofen (LIORESAL) 10 MG tablet Take 1 tablet (10 mg total) by mouth every morning. 90 tablet 1    biotin 10,000 mcg Cap Take 1 tablet by mouth once daily.      busPIRone (BUSPAR) 10 MG tablet Take 1 tablet (10 mg total) by mouth once daily. 90 tablet 3    calcitonin, salmon, (FORTICAL) 200 unit/actuation nasal spray 1 spray by Nasal route once daily. 3.7 mL 3    carvediloL (COREG) 3.125 MG tablet Take 1 tablet (3.125 mg total) by mouth 2 (two) times daily. 180 tablet 3    cloNIDine (CATAPRES) 0.1 MG tablet Take 1 tablet (0.1 mg total) by mouth every evening. 90 tablet 3    cycloSPORINE modified, NEORAL, (NEORAL) 25 MG capsule Take 3 capsules (75 mg total) by mouth 2 (two) times daily. 270 capsule 11    DULoxetine (CYMBALTA) 30 MG capsule TAKE 1 CAPSULE EVERY DAY 90 capsule 1    EVENING PRIMROSE OIL ORAL Take 1,000 mg by mouth once daily.      ferrous sulfate (FEOSOL) 325 mg (65 mg iron) Tab tablet Take 1 tablet (325 mg total) by mouth once daily. 100 tablet 3    furosemide (LASIX) 20 MG tablet Take 1 tablet (20 mg total) by mouth 2 (two) times a day for 5 days, THEN 1 tablet (20 mg total) once daily. Take 1 tablet daily. 370 tablet 0    hydrALAZINE (APRESOLINE) 50 MG tablet TAKE 2 TABLETS  EVERY 8  HOURS. 540 tablet 3    HYDROcodone-acetaminophen (NORCO) 7.5-325 mg per tablet Take 1 tablet by mouth every 6 (six) hours as needed for Pain. 28 tablet 0    LIDOCAINE VISCOUS 2 % solution SWISH AND SPIT 10 MLS EVERY 4 (FOUR) HOURS AS NEEDED (MOUTH&/OR THROAT PAIN). FOR MOUTH SORES 450 each 11    LIDOcaine-prilocaine (EMLA) cream Apply topically as needed. 30 g 2    linezolid (ZYVOX) 600 mg Tab       losartan (COZAAR) 25 MG tablet Take 1 tablet (25 mg total) by mouth once daily. 90 tablet 3    multivitamin  capsule Take 1 capsule by mouth once daily.      ondansetron (ZOFRAN-ODT) 8 MG TbDL Take 1 tablet (8 mg total) by mouth every 8 (eight) hours as needed (nausea). 60 tablet 5    pantoprazole (PROTONIX) 40 MG tablet Take 1 tablet (40 mg total) by mouth once daily. 90 tablet 1    pregabalin (LYRICA) 50 MG capsule Take 1 capsule (50 mg total) by mouth 2 (two) times daily. NOON & NIGHT TIME 180 capsule 1    temazepam (RESTORIL) 30 mg capsule Take 1 at bedtime 90 capsule 1    vancomycin (VANCOCIN) 125 MG capsule Take 1 capsule (125 mg total) by mouth 4 (four) times daily. for 20 days 80 capsule 0    vitamin E 400 UNIT capsule Take 400 Units by mouth once daily.      zolpidem (AMBIEN) 10 mg Tab TAKE 1 TABLET BY MOUTH ONCE DAILY IN THE EVENING 90 tablet 1     Current Facility-Administered Medications   Medication Dose Route Frequency Provider Last Rate Last Admin    denosumab (PROLIA) injection 60 mg  60 mg Subcutaneous Q6 Months Prasanth Johnson MD         Facility-Administered Medications Ordered in Other Visits   Medication Dose Route Frequency Provider Last Rate Last Admin    lactated ringers infusion   Intravenous Continuous Jennifer Carr MD 10 mL/hr at 11/09/21 0717 Restarted at 11/09/21 0820       Review of patient's allergies indicates:   Allergen Reactions    Lisinopril Swelling and Rash    Augmentin [amoxicillin-pot clavulanate] Diarrhea       Past Medical History:   Diagnosis Date    Abdominal wall hernia     CT Renal 6/11/2018---Small fat containing superior ventral abdominal wall hernia at the epicardial pacing lead site.    Anxiety     Arthritis     ZEN HIPS    Breast cancer in female 08/2021    LEFT BREAST    Chronic kidney disease     stage 4, GFR 15-29 ml/min    Chronic midline low back pain without sciatica 6/18/2018    Coronary artery disease 1993    heart transplant    Depression     Fibromyalgia     on Lyrica    Heart failure     native heart cardiomyopathy    Heart transplanted  1993    due to cardiomyopathy    History of hyperparathyroidism; Hyperparathyroidism, secondary renal     PT DENIES    Hypertension     Immune disorder     anti rejection meds    Iron deficiency anemia 8/15/2017    Kidney stones     passed per pt    Obesity     Other osteoporosis without current pathological fracture 8/30/2019    Shingles 2003 approx    left leg    Trouble in sleeping     Urinary incontinence        Past Surgical History:   Procedure Laterality Date    BLADDER SURGERY  2015 approx    mesh - Dr Everett then 2nd reconstructive sx Dr Onofre    BREAST BIOPSY Bilateral     NEGATIVE    BREAST SURGERY Left 9/28/15    Bx - benign    BREAST SURGERY Right 12/2015    Bx benign    CARDIAC PACEMAKER REMOVAL Left 06/26/2014    Pacer defirillator removed. Put in 1993 aat time of heart transplant    CARPAL TUNNEL RELEASE Left 03/03/15    Dr. Hall    COLONOSCOPY N/A 2/25/2021    Procedure: COLONOSCOPY;  Surgeon: Freida Ramirez MD;  Location[facility];  Service: Endoscopy;  Laterality: N/A;    HEART TRANSPLANT  1993    HERNIA REPAIR Right 1971 approx    Inguinal    HYSTERECTOMY  1983    vag hyst /LSO     INCISION AND DRAINAGE OF ABSCESS Left 12/24/2021    Procedure: INCISION AND DRAINAGE, ABSCESS;  Surgeon: Joseph Longo MD;  [facility];  Service: General;  Laterality: Left;    INSERTION OF TUNNELED CENTRAL VENOUS CATHETER (CVC) WITH SUBCUTANEOUS PORT N/A 11/9/2021    Procedure: [id number];  Surgeon: Christoph Douglas MD;  Location: [facility] OR;  Service: General;  Laterality: N/A;    SENTINEL LYMPH NODE BIOPSY Left 10/12/2021    Procedure: BIOPSY, LYMPH NODE, SENTINEL;  Surgeon: Christoph Douglas MD;  Location: [facility] OR;  Service: General;  Laterality: Left;    TOE SURGERY         Family History   Problem Relation Age of Onset    Cancer Mother 38        breast    Breast cancer Mother     Heart disease Maternal Grandmother     Cataracts Cousin     Hypertension Son      Diabetes Neg Hx     Stroke Neg Hx     Kidney disease Neg Hx     Asthma Neg Hx     COPD Neg Hx     Melanoma Neg Hx     Hyperlipidemia Neg Hx        Social History     Socioeconomic History    Marital status: Single    Number of children: 2    Highest education level: 11th grade   Occupational History    Occupation: Retired   Tobacco Use    Smoking status: Never Smoker    Smokeless tobacco: Never Used   Substance and Sexual Activity    Alcohol use: Never     Alcohol/week: 0.0 standard drinks    Drug use: No    Sexual activity: Not Currently     Partners: Male     Birth control/protection: See Surgical Hx   Other Topics Concern    Are you pregnant or think you may be? No    Breast-feeding No   Social History Narrative    Single. 2 children , 1  at 30 yoa   strep throat -  pneumonia and renal complications after not completing course of AB. Other child lives in [place], [place]. Has a cousin locally that could help in an emergency. 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 Caffeine intake =- 1 cola a day. No coffee, + occasional tea, avoids caffeine especially at night. Still drives. She does not have a Living Will or Advanced directive.      Social Determinants of Health     Financial Resource Strain: Low Risk     Difficulty of Paying Living Expenses: Not hard at all   Food Insecurity: No Food Insecurity    Worried About Running Out of Food in the Last Year: Never true    Ran Out of Food in the Last Year: Never true   Transportation Needs: No Transportation Needs    Lack of Transportation (Medical): No    Lack of Transportation (Non-Medical): No   Physical Activity: Insufficiently Active    Days of Exercise per Week: 2 days    Minutes of Exercise per Session: 10 min   Stress: No Stress Concern Present    Feeling of Stress : Not at all   Social Connections: Socially Isolated    Frequency of Communication with Friends and Family: Once a week    Frequency of  Social Gatherings with Friends and Family: Once a week    Attends Catholic Services: More than 4 times per year    Active Member of Clubs or Organizations: No    Attends Club or Organization Meetings: Never    Marital Status: Never    Housing Stability: Low Risk     Unable to Pay for Housing in the Last Year: No    Number of Places Lived in the Last Year: 1    Unstable Housing in the Last Year: No       Vitals:    01/07/22 0801   BP: 138/87   Pulse: 110   Temp: 97.5 °F (36.4 °C)       Physical Exam  Constitutional:       Appearance: She is well-developed.   HENT:      Head: Normocephalic and atraumatic.      Right Ear: External ear normal.      Left Ear: External ear normal.      Mouth/Throat:      Pharynx: No oropharyngeal exudate.   Eyes:      General: No scleral icterus.        Right eye: No discharge.         Left eye: No discharge.      Conjunctiva/sclera: Conjunctivae normal.      Pupils: Pupils are equal, round, and reactive to light.   Neck:      Thyroid: No thyromegaly.   Cardiovascular:      Rate and Rhythm: Normal rate and regular rhythm.      Heart sounds: Normal heart sounds.   Pulmonary:      Effort: Pulmonary effort is normal.      Breath sounds: Normal breath sounds.   Chest:   Breasts:      Right: No inverted nipple, mass, nipple discharge, skin change, tenderness or supraclavicular adenopathy.      Left: No inverted nipple, mass, nipple discharge, skin change, tenderness or supraclavicular adenopathy.         Abdominal:      General: Bowel sounds are normal.      Palpations: Abdomen is soft.   Musculoskeletal:         General: Normal range of motion.      Right shoulder: No crepitus. Normal strength.      Cervical back: Normal range of motion and neck supple.   Lymphadenopathy:      Head:      Right side of head: No submental, submandibular, tonsillar, preauricular, posterior auricular or occipital adenopathy.      Left side of head: No submental, submandibular, tonsillar,  preauricular, posterior auricular or occipital adenopathy.      Cervical: No cervical adenopathy.      Right cervical: No superficial or posterior cervical adenopathy.     Left cervical: No superficial or posterior cervical adenopathy.      Upper Body:      Right upper body: No supraclavicular adenopathy.      Left upper body: No supraclavicular adenopathy.   Skin:     General: Skin is warm and dry.      Coloration: Skin is not pale.      Findings: No erythema or rash.   Neurological:      Mental Status: She is alert and oriented to person, place, and time.      Deep Tendon Reflexes: Reflexes are normal and symmetric.   Psychiatric:         Behavior: Behavior normal.         Thought Content: Thought content normal.         Judgment: Judgment normal.         Result:   Mammo Digital Diagnostic Left with Iván  US Breast Left Limited     History:  Patient is 66 y.o. and is seen for diagnostic imaging.     Films Compared:  Compared to: 07/13/2021 Mammo Digital Screening Bilat w/ Iván, 12/22/2017 Mammo Digital Screening Bilat with CAD, 12/15/2015 Mammo Previous, and 09/28/2015 Mammo Previous     Findings:  This procedure was performed using tomosynthesis. Computer-aided detection was utilized in the interpretation of this examination.  Mammo Digital Diagnostic Left with Iván  The left breast has scattered areas of fibroglandular density.     There is an asymmetry seen in the left breast in the middle depth. There are also associated calcifications.   No sonographic correlate to the abnormality.  No concerning axillary nodes.  Additional calcifications are seen just anterior to the asymmetry. If pathology returns malignancy, then this area will need to be excised as well.     Impression:  Left  Asymmetry: Left breast asymmetry at the middle position with associated calcifications. Assessment: 4 - Suspicious finding. Stereotactic Biopsy is recommended.      BI-RADS Category:   Overall: 4 - Suspicious      Recommendation:  Stereotactic Biopsy is recommended.        Your estimated lifetime risk of breast cancer (to age 85) based on Tyrer-Cuzick risk assessment model is Tyrer-Cuzick: 8.61 %. According to the American Cancer Society, patients with a lifetime breast cancer risk of 20% or higher might benefit from supplemental screening tests.          Final Pathologic Diagnosis 1.  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), cribriform and solid patterns with central   necrosis   The DCIS measures approximately 4 cm in size, present in 10/37 blocks   The microinvasive carcinoma measures 2 mm to the closest posterior surgical   margin, 18 mm to the closest superior surgical margin, 21 mm to the closest   anterior surgical margin, and at least 10 mm from the remainder of the   surgical margins   DCIS measures 2 mm to the closest posterior surgical margin, 12 mm to closest   superior surgical margin, 14 mm to the closest anterior surgical margin, and   at least 10 mm from the remainder of the surgical margins   Please see complete surgical pathology cancer case summary below   PATHOLOGIC TNM STAGING: (m)pTmi   Selected slides have been reviewed by Dr. Yana Paulino MD, who concurs   with the diagnosis.   2.  Superior, deep, and lateral margins, excision:   Fibroadipose tissue   Negative for malignancy   Surgical Pathology Cancer Case Summary   Procedure - Excision (less than total mastectomy)   Specimen laterality - left   Histologic type - micro-invasive carcinoma   Histologic grade - Not applicable (microinvasive only)   Tumor size - Microinvasion only (less than or equal to 1 mm)   Tumor focality - multifocal   Ductal carcinoma in situ - present   Positive for extensive intraductal component (EIC)   Size (extent) of DCIS - approximately 4 cm   Number of blocks with DCIS: 10   Number of blocks examined: 43   Architectural patterns  "- solid, cribriform   Nuclear grade - Grade III (high)   Necrosis: Present, central (expansive "comedo" necrosis)   Microcalcifications - present in DCIS and non-neoplastic tissue   Treatment effect in the breast - No known presurgical therapy   Margins   Margin status for invasive carcinoma   All margins negative for invasive carcinoma   The microinvasive carcinoma measures 2 mm to the closest posterior surgical   margin, 18 mm to the closest superior surgical margin, 21 mm to the closest   anterior surgical margin, and at least 10 mm from the remainder of the   surgical margins   Margin status for DCIS   All margins negative for DCIS   DCIS measures 2 mm to the closest posterior surgical margin, 12 mm to closest   superior surgical margin, 14 mm to the closest anterior surgical margin, and   at least 10 mm from the remainder of the surgical margins   Regional lymph nodes   Not applicable (no regional lymph nodes submitted or found)   Pathologic Stage Classification   TNM descriptors   m (multiple foci of invasive carcinoma), approximately 4 foci   pT-pTmi: Tumor less than or equal to 1 mm   pN-not assigned (no nodes submitted or found)   Additional findings - Fibrocystic changes         Final Pathologic Diagnosis 1. [place] lymph node #1, biopsy:       -  One lymph node, POSITIVE for metastatic carcinoma (1/1)       -  Largest metastatic deposit:  1.5 mm       -  Extracapsular extension:  Not identified       -  Immunohistochemical stain for CAM 5.2, AE1/AE3 and wide spectrum of   keratin are positive in          tumor cells   2. [place] lymph node #2, biopsy:       -  One lymph node, negative for metastatic carcinoma (0/1)       -  Immunohistochemical stain for CAM 5.2, AE1/AE3 and wide spectrum of   keratin are negative         Assessment & Plan:  67 y/o female status post left breast lumpectomy s/p left breast SLNbx  Status post I&D left axilla    Continue daily dressing changes silver nitrate applied, no " other abscess noted will keep monitoring as she is high risk for development with immunosupression  Keep scheduled Follow-up for wound check and possible port removal

## 2022-01-07 NOTE — PROGRESS NOTES
Patient in clinic on 1/6/22 for her wound dressing change. Patient dressing was changed by the provider ONELIA Dickson. Patient verbally understood all information given.

## 2022-01-10 ENCOUNTER — OFFICE VISIT (OUTPATIENT)
Dept: PRIMARY CARE CLINIC | Facility: CLINIC | Age: 67
End: 2022-01-10
Payer: MEDICARE

## 2022-01-10 ENCOUNTER — DOCUMENT SCAN (OUTPATIENT)
Dept: HOME HEALTH SERVICES | Facility: HOSPITAL | Age: 67
End: 2022-01-10
Payer: MEDICARE

## 2022-01-10 VITALS
HEIGHT: 62 IN | BODY MASS INDEX: 30.67 KG/M2 | OXYGEN SATURATION: 99 % | TEMPERATURE: 98 F | HEART RATE: 99 BPM | DIASTOLIC BLOOD PRESSURE: 82 MMHG | SYSTOLIC BLOOD PRESSURE: 136 MMHG | WEIGHT: 166.69 LBS

## 2022-01-10 DIAGNOSIS — I70.0 AORTIC ATHEROSCLEROSIS: ICD-10-CM

## 2022-01-10 DIAGNOSIS — A04.72 C. DIFFICILE COLITIS: ICD-10-CM

## 2022-01-10 DIAGNOSIS — I10 ESSENTIAL HYPERTENSION: ICD-10-CM

## 2022-01-10 DIAGNOSIS — D05.12 DUCTAL CARCINOMA IN SITU (DCIS) OF LEFT BREAST: ICD-10-CM

## 2022-01-10 DIAGNOSIS — Z79.899 IMMUNOSUPPRESSION DUE TO DRUG THERAPY: ICD-10-CM

## 2022-01-10 DIAGNOSIS — R52 PAIN: Primary | ICD-10-CM

## 2022-01-10 DIAGNOSIS — K21.9 GASTROESOPHAGEAL REFLUX DISEASE WITHOUT ESOPHAGITIS: Chronic | ICD-10-CM

## 2022-01-10 DIAGNOSIS — D84.821 IMMUNOSUPPRESSION DUE TO DRUG THERAPY: ICD-10-CM

## 2022-01-10 DIAGNOSIS — E21.3 HYPERPARATHYROIDISM: ICD-10-CM

## 2022-01-10 DIAGNOSIS — D84.9 IMMUNOCOMPROMISED PATIENT: ICD-10-CM

## 2022-01-10 PROCEDURE — 1101F PT FALLS ASSESS-DOCD LE1/YR: CPT | Mod: HCNC,CPTII,S$GLB, | Performed by: NURSE PRACTITIONER

## 2022-01-10 PROCEDURE — 3008F BODY MASS INDEX DOCD: CPT | Mod: HCNC,CPTII,S$GLB, | Performed by: NURSE PRACTITIONER

## 2022-01-10 PROCEDURE — 3075F PR MOST RECENT SYSTOLIC BLOOD PRESS GE 130-139MM HG: ICD-10-PCS | Mod: HCNC,CPTII,S$GLB, | Performed by: NURSE PRACTITIONER

## 2022-01-10 PROCEDURE — 3288F FALL RISK ASSESSMENT DOCD: CPT | Mod: HCNC,CPTII,S$GLB, | Performed by: NURSE PRACTITIONER

## 2022-01-10 PROCEDURE — 99499 RISK ADDL DX/OHS AUDIT: ICD-10-PCS | Mod: HCNC,S$GLB,, | Performed by: NURSE PRACTITIONER

## 2022-01-10 PROCEDURE — 3079F DIAST BP 80-89 MM HG: CPT | Mod: HCNC,CPTII,S$GLB, | Performed by: NURSE PRACTITIONER

## 2022-01-10 PROCEDURE — 99999 PR PBB SHADOW E&M-EST. PATIENT-LVL V: ICD-10-PCS | Mod: PBBFAC,HCNC,, | Performed by: NURSE PRACTITIONER

## 2022-01-10 PROCEDURE — 99215 PR OFFICE/OUTPT VISIT, EST, LEVL V, 40-54 MIN: ICD-10-PCS | Mod: HCNC,S$GLB,, | Performed by: NURSE PRACTITIONER

## 2022-01-10 PROCEDURE — 3075F SYST BP GE 130 - 139MM HG: CPT | Mod: HCNC,CPTII,S$GLB, | Performed by: NURSE PRACTITIONER

## 2022-01-10 PROCEDURE — 1160F RVW MEDS BY RX/DR IN RCRD: CPT | Mod: HCNC,CPTII,S$GLB, | Performed by: NURSE PRACTITIONER

## 2022-01-10 PROCEDURE — 1160F PR REVIEW ALL MEDS BY PRESCRIBER/CLIN PHARMACIST DOCUMENTED: ICD-10-PCS | Mod: HCNC,CPTII,S$GLB, | Performed by: NURSE PRACTITIONER

## 2022-01-10 PROCEDURE — 3079F PR MOST RECENT DIASTOLIC BLOOD PRESSURE 80-89 MM HG: ICD-10-PCS | Mod: HCNC,CPTII,S$GLB, | Performed by: NURSE PRACTITIONER

## 2022-01-10 PROCEDURE — 1159F MED LIST DOCD IN RCRD: CPT | Mod: HCNC,CPTII,S$GLB, | Performed by: NURSE PRACTITIONER

## 2022-01-10 PROCEDURE — 99499 UNLISTED E&M SERVICE: CPT | Mod: HCNC,S$GLB,, | Performed by: NURSE PRACTITIONER

## 2022-01-10 PROCEDURE — 1126F AMNT PAIN NOTED NONE PRSNT: CPT | Mod: HCNC,CPTII,S$GLB, | Performed by: NURSE PRACTITIONER

## 2022-01-10 PROCEDURE — 1159F PR MEDICATION LIST DOCUMENTED IN MEDICAL RECORD: ICD-10-PCS | Mod: HCNC,CPTII,S$GLB, | Performed by: NURSE PRACTITIONER

## 2022-01-10 PROCEDURE — 1157F ADVNC CARE PLAN IN RCRD: CPT | Mod: HCNC,CPTII,S$GLB, | Performed by: NURSE PRACTITIONER

## 2022-01-10 PROCEDURE — 1157F PR ADVANCE CARE PLAN OR EQUIV PRESENT IN MEDICAL RECORD: ICD-10-PCS | Mod: HCNC,CPTII,S$GLB, | Performed by: NURSE PRACTITIONER

## 2022-01-10 PROCEDURE — 99215 OFFICE O/P EST HI 40 MIN: CPT | Mod: HCNC,S$GLB,, | Performed by: NURSE PRACTITIONER

## 2022-01-10 PROCEDURE — 3288F PR FALLS RISK ASSESSMENT DOCUMENTED: ICD-10-PCS | Mod: HCNC,CPTII,S$GLB, | Performed by: NURSE PRACTITIONER

## 2022-01-10 PROCEDURE — 3008F PR BODY MASS INDEX (BMI) DOCUMENTED: ICD-10-PCS | Mod: HCNC,CPTII,S$GLB, | Performed by: NURSE PRACTITIONER

## 2022-01-10 PROCEDURE — 99999 PR PBB SHADOW E&M-EST. PATIENT-LVL V: CPT | Mod: PBBFAC,HCNC,, | Performed by: NURSE PRACTITIONER

## 2022-01-10 PROCEDURE — 1101F PR PT FALLS ASSESS DOC 0-1 FALLS W/OUT INJ PAST YR: ICD-10-PCS | Mod: HCNC,CPTII,S$GLB, | Performed by: NURSE PRACTITIONER

## 2022-01-10 PROCEDURE — 1126F PR PAIN SEVERITY QUANTIFIED, NO PAIN PRESENT: ICD-10-PCS | Mod: HCNC,CPTII,S$GLB, | Performed by: NURSE PRACTITIONER

## 2022-01-10 RX ORDER — CLONIDINE HYDROCHLORIDE 0.1 MG/1
0.1 TABLET ORAL NIGHTLY PRN
Qty: 90 TABLET | Refills: 3 | Status: SHIPPED | OUTPATIENT
Start: 2022-01-10 | End: 2022-06-21

## 2022-01-10 RX ORDER — VANCOMYCIN HYDROCHLORIDE 125 MG/1
CAPSULE ORAL
Qty: 60 CAPSULE | Refills: 0 | Status: SHIPPED | OUTPATIENT
Start: 2022-01-10 | End: 2022-02-08

## 2022-01-10 RX ORDER — CARVEDILOL 6.25 MG/1
6.25 TABLET ORAL 2 TIMES DAILY
Qty: 180 TABLET | Refills: 3 | Status: SHIPPED | OUTPATIENT
Start: 2022-01-10 | End: 2022-03-28

## 2022-01-10 NOTE — ASSESSMENT & PLAN NOTE
Controlled.   Wants to reduce pill burden.   Incr carvedilol to 6.25 BID, stop amlodipine.   Clonidine PRN. Continue losartan.   Monitor home BP

## 2022-01-10 NOTE — PATIENT INSTRUCTIONS
STOP amlodipine.    Increase carvedilol to 6.25 mg twice daily.     Check BP in evenings prior to taking clonidine. If > 150/95 then take PM clonidine dose.

## 2022-01-10 NOTE — PROGRESS NOTES
Nadia Damon  01/10/2022  [id number]    Luz Dickson NP  Patient Care Team:  Luz Dickson NP as PCP - General (Family Medicine)  Miguel Soni Jr., MD as Consulting Physician (Vascular Surgery)  Ike King MD as Consulting Physician (Cardiology)  Courtney Tubbs MD as Consulting Physician (Cardiology)  Carter Crawford MD as Consulting Physician (Nephrology)  Paxton Vasques OD as Consulting Physician (Optometry)  PRANAY Villalobos MD as Obstetrician (Obstetrics)  Parker Mccarthy IV, MD (Urology)  Ike King MD as Consulting Physician (Cardiology)  Jose Roland MD as Consulting Physician (Dermatology)  Prasanth Johnson MD as Consulting Physician (Rheumatology)  Ayanna Hart, RN as Oncology Navigator  Nora Morin [facility] [place]   Ray Baker RN as Outpatient         [facility] Primary Care Note      Chief Complaint:  Chief Complaint   Patient presents with    Wound check under left arm        History of Present Illness:  HPI    F/U pain left axilla. Seen by surgeon Friday. Silver Nitrate was applied.   No further tenderness to axilla.     Still taking PO vanc QID. 3 soft BMs/day.  Some nausea yesterday but she mistakenly took two Zyvox then/           Review of Systems   Constitutional: Negative for chills, fever and malaise/fatigue.   Eyes: Negative for blurred vision.   Respiratory: Negative for cough and shortness of breath.    Cardiovascular: Positive for leg swelling. Negative for chest pain.   Gastrointestinal: Positive for diarrhea. Negative for abdominal pain, blood in stool, constipation, nausea and vomiting.   Genitourinary: Negative for dysuria.   Neurological: Negative for dizziness.         The following were reviewed: Active problem list, medication list, allergies, family history, social history, and Health Maintenance.     History:  Past Medical History:   Diagnosis Date    Abdominal wall hernia     CT Renal 6/11/2018---Small fat containing  superior ventral abdominal wall hernia at the epicardial pacing lead site.    Anxiety     Arthritis     ZEN HIPS    Breast cancer in female 08/2021    LEFT BREAST    Chronic kidney disease     stage 4, GFR 15-29 ml/min    Chronic midline low back pain without sciatica 6/18/2018    Coronary artery disease 1993    heart transplant    Depression     Fibromyalgia     on Lyrica    Heart failure     native heart cardiomyopathy    Heart transplanted 1993    due to cardiomyopathy    History of hyperparathyroidism; Hyperparathyroidism, secondary renal     PT DENIES    Hypertension     Immune disorder     anti rejection meds    Iron deficiency anemia 8/15/2017    Kidney stones     passed per pt    Obesity     Other osteoporosis without current pathological fracture 8/30/2019    Shingles 2003 approx    left leg    Trouble in sleeping     Urinary incontinence      Past Surgical History:   Procedure Laterality Date    BLADDER SURGERY  2015 approx    mesh - Dr Everett then 2nd reconstructive sx Dr Onofre    BREAST BIOPSY Bilateral     NEGATIVE    BREAST SURGERY Left 9/28/15    Bx - benign    BREAST SURGERY Right 12/2015    Bx benign    CARDIAC PACEMAKER REMOVAL Left 06/26/2014    Pacer defirillator removed. Put in 1993 aat time of heart transplant    CARPAL TUNNEL RELEASE Left 03/03/15    Dr. Hall    COLONOSCOPY N/A 2/25/2021    Procedure: COLONOSCOPY;  Surgeon: Freida Ramirez MD;  Location: [facility] ENDO;  Service: Endoscopy;  Laterality: N/A;    HEART TRANSPLANT  1993    HERNIA REPAIR Right 1971 approx    Inguinal    HYSTERECTOMY  1983    vag hyst /LSO     INCISION AND DRAINAGE OF ABSCESS Left 12/24/2021    Procedure: INCISION AND DRAINAGE, ABSCESS;  Surgeon: Joseph Longo MD;  Location: [facility] OR;  Service: General;  Laterality: Left;    INSERTION OF TUNNELED CENTRAL VENOUS CATHETER (CVC) WITH SUBCUTANEOUS PORT N/A 11/9/2021    Procedure: [id number];  Surgeon: Christoph Douglas MD;   Location: [facility] OR;  Service: General;  Laterality: N/A;    SENTINEL LYMPH NODE BIOPSY Left 10/12/2021    Procedure: BIOPSY, LYMPH NODE, SENTINEL;  Surgeon: Christoph Douglas MD;  Location: [facility] OR;  Service: General;  Laterality: Left;    TOE SURGERY       Family History   Problem Relation Age of Onset    Cancer Mother 38        breast    Breast cancer Mother     Heart disease Maternal Grandmother     Cataracts Cousin     Hypertension Son     Diabetes Neg Hx     Stroke Neg Hx     Kidney disease Neg Hx     Asthma Neg Hx     COPD Neg Hx     Melanoma Neg Hx     Hyperlipidemia Neg Hx      Social History     Socioeconomic History    Marital status: Single    Number of children: 2    Highest education level: 11th grade   Occupational History    Occupation: Retired   Tobacco Use    Smoking status: Never Smoker    Smokeless tobacco: Never Used   Substance and Sexual Activity    Alcohol use: Never     Alcohol/week: 0.0 standard drinks    Drug use: No    Sexual activity: Not Currently     Partners: Male     Birth control/protection: See Surgical Hx   Other Topics Concern    Are you pregnant or think you may be? No    Breast-feeding No   Social History Narrative    Single. 2 children , 1  at 30 yoa  2014 strep throat -  pneumonia and renal complications after not completing course of AB. Other child lives in [place], [place]. Has a cousin locally that could help in an emergency. 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 Caffeine intake =- 1 cola a day. No coffee, + occasional tea, avoids caffeine especially at night. Still drives. She does not have a Living Will or Advanced directive.      Social Determinants of Health     Financial Resource Strain: Low Risk     Difficulty of Paying Living Expenses: Not hard at all   Food Insecurity: No Food Insecurity    Worried About Running Out of Food in the Last Year: Never true    Ran Out of Food in the Last Year: Never true    Transportation Needs: No Transportation Needs    Lack of Transportation (Medical): No    Lack of Transportation (Non-Medical): No   Physical Activity: Insufficiently Active    Days of Exercise per Week: 2 days    Minutes of Exercise per Session: 10 min   Stress: No Stress Concern Present    Feeling of Stress : Not at all   Social Connections: Socially Isolated    Frequency of Communication with Friends and Family: Once a week    Frequency of Social Gatherings with Friends and Family: Once a week    Attends Adventism Services: More than 4 times per year    Active Member of Clubs or Organizations: No    Attends Club or Organization Meetings: Never    Marital Status: Never    Housing Stability: Low Risk     Unable to Pay for Housing in the Last Year: No    Number of Places Lived in the Last Year: 1    Unstable Housing in the Last Year: No     Patient Active Problem List   Diagnosis    Heart transplanted    Anxiety    Insomnia    CKD (chronic kidney disease) stage 4, GFR 15-29 ml/min    Immunosuppression due to drug therapy    Fibromyalgia    Gastroesophageal reflux disease without esophagitis    Breast screening    Immunization deficiency    Essential hypertension    Aortic atherosclerosis    Chronic major depressive disorder, recurrent episode    Iron deficiency anemia    Vaginal atrophy    Hyperparathyroidism    Hx of falling    Chronic midline low back pain without sciatica    Closed nondisplaced fracture of distal phalanx of left great toe with routine healing    Lightheadedness    Medication side effects    Obesity (BMI 30.0-34.9)    Edema    Asymptomatic menopausal state     Other osteoporosis without current pathological fracture    Cough    Abnormal CT scan, kidney    Weakness of both lower extremities    Immunocompromised patient    Osteoporosis, post-menopausal    Ductal carcinoma in situ (DCIS) of left breast    Immunodeficiency secondary to radiation  therapy    Abnormal mammogram    The hangs, uncomplicated    Neutropenic fever    Severe sepsis    GRACE (acute kidney injury)    Diarrhea    Drug-induced pancytopenia    Thrombocytopenia, unspecified    Immunodeficiency due to chemotherapy    C. difficile colitis    Anemia    Strain of left shoulder    Left shoulder pain    Ulnar neuropathy of left upper extremity    Chronic diastolic heart failure    Cellulitis of axilla, left     Review of patient's allergies indicates:   Allergen Reactions    Lisinopril Swelling and Rash    Augmentin [amoxicillin-pot clavulanate] Diarrhea       Medications:  Current Outpatient Medications on File Prior to Visit   Medication Sig Dispense Refill    acetaminophen (TYLENOL) 325 MG tablet Take 1 tablet (325 mg total) by mouth every 6 (six) hours as needed for Pain.  0    aspirin (ECOTRIN) 81 MG EC tablet Take 1 tablet (81 mg total) by mouth once daily. 90 tablet 3    baclofen (LIORESAL) 10 MG tablet Take 1 tablet (10 mg total) by mouth every morning. 90 tablet 1    biotin 10,000 mcg Cap Take 1 tablet by mouth once daily.      busPIRone (BUSPAR) 10 MG tablet Take 1 tablet (10 mg total) by mouth once daily. 90 tablet 3    calcitonin, salmon, (FORTICAL) 200 unit/actuation nasal spray 1 spray by Nasal route once daily. 3.7 mL 3    carvediloL (COREG) 3.125 MG tablet Take 1 tablet (3.125 mg total) by mouth 2 (two) times daily. 180 tablet 3    cloNIDine (CATAPRES) 0.1 MG tablet Take 1 tablet (0.1 mg total) by mouth every evening. 90 tablet 3    cycloSPORINE modified, NEORAL, (NEORAL) 25 MG capsule Take 3 capsules (75 mg total) by mouth 2 (two) times daily. 270 capsule 11    DULoxetine (CYMBALTA) 30 MG capsule TAKE 1 CAPSULE EVERY DAY 90 capsule 1    EVENING PRIMROSE OIL ORAL Take 1,000 mg by mouth once daily.      ferrous sulfate (FEOSOL) 325 mg (65 mg iron) Tab tablet Take 1 tablet (325 mg total) by mouth once daily. 100 tablet 3    furosemide (LASIX) 20 MG  tablet Take 1 tablet (20 mg total) by mouth 2 (two) times a day for 5 days, THEN 1 tablet (20 mg total) once daily. Take 1 tablet daily. 370 tablet 0    hydrALAZINE (APRESOLINE) 50 MG tablet TAKE 2 TABLETS  EVERY 8  HOURS. 540 tablet 3    HYDROcodone-acetaminophen (NORCO) 7.5-325 mg per tablet Take 1 tablet by mouth every 6 (six) hours as needed for Pain. 28 tablet 0    LIDOCAINE VISCOUS 2 % solution SWISH AND SPIT 10 MLS EVERY 4 (FOUR) HOURS AS NEEDED (MOUTH&/OR THROAT PAIN). FOR MOUTH SORES 450 each 11    LIDOcaine-prilocaine (EMLA) cream Apply topically as needed. 30 g 2    linezolid (ZYVOX) 600 mg Tab       losartan (COZAAR) 25 MG tablet Take 1 tablet (25 mg total) by mouth once daily. 90 tablet 3    multivitamin capsule Take 1 capsule by mouth once daily.      ondansetron (ZOFRAN-ODT) 8 MG TbDL Take 1 tablet (8 mg total) by mouth every 8 (eight) hours as needed (nausea). 60 tablet 5    pantoprazole (PROTONIX) 40 MG tablet Take 1 tablet (40 mg total) by mouth once daily. 90 tablet 1    pregabalin (LYRICA) 50 MG capsule Take 1 capsule (50 mg total) by mouth 2 (two) times daily. NOON & NIGHT TIME 180 capsule 1    temazepam (RESTORIL) 30 mg capsule Take 1 at bedtime 90 capsule 1    vitamin E 400 UNIT capsule Take 400 Units by mouth once daily.      zolpidem (AMBIEN) 10 mg Tab TAKE 1 TABLET BY MOUTH ONCE DAILY IN THE EVENING 90 tablet 1    [DISCONTINUED] amLODIPine (NORVASC) 2.5 MG tablet Take 1 tablet (2.5 mg total) by mouth once daily. 90 tablet 3    [DISCONTINUED] vancomycin (VANCOCIN) 125 MG capsule Take 1 capsule (125 mg total) by mouth 4 (four) times daily. for 20 days 80 capsule 0     Current Facility-Administered Medications on File Prior to Visit   Medication Dose Route Frequency Provider Last Rate Last Admin    denosumab (PROLIA) injection 60 mg  60 mg Subcutaneous Q6 Months Prasanth Johnson MD        lactated ringers infusion   Intravenous Continuous Jennifer Carr MD 10 mL/hr at 11/09/21  0717 Restarted at 11/09/21 0820       Medications have been reviewed and reconciled with patient at visit today.    Barriers to medications present (no )    Adverse reactions to current medications (no)      Exam:  Vitals:    01/10/22 0809   BP: 136/82   Pulse: 99   Temp: 97.7 °F (36.5 °C)     Weight: 75.6 kg (166 lb 10.7 oz)   Body mass index is 30.48 kg/m².      BP Readings from Last 3 Encounters:   01/10/22 136/82   01/07/22 138/87   01/06/22 122/79     Wt Readings from Last 3 Encounters:   01/10/22 0809 75.6 kg (166 lb 10.7 oz)   01/07/22 0801 75.1 kg (165 lb 9.1 oz)   01/06/22 1347 75.4 kg (166 lb 3.6 oz)        Physical Exam  Constitutional:       Appearance: She is ill-appearing (chronically).   HENT:      Mouth/Throat:      Mouth: Mucous membranes are moist.   Cardiovascular:      Rate and Rhythm: Normal rate and regular rhythm.   Pulmonary:      Effort: Pulmonary effort is normal.   Chest:    Lungs CTA bilaterally.   Skin:     General: Skin is warm and dry.    Incision left axilla healing, decreased depth, approx 4 mm, wound bed pink/beefy with no slough, no odor, no edema/induration, scant serous drainage.   Neurological:      Mental Status: She is alert.       Laboratory Reviewed: (Yes)  Lab Results   Component Value Date    WBC 4.94 01/05/2022    HGB 9.1 (L) 01/05/2022    HCT 29.0 (L) 01/05/2022     01/05/2022    CHOL 157 12/24/2021    TRIG 120 12/24/2021    HDL 42 12/24/2021    ALT 16 01/05/2022    AST 24 01/05/2022     01/05/2022    K 4.3 01/05/2022     01/05/2022    CREATININE 2.7 (H) 01/05/2022    BUN 30 (H) 01/05/2022    CO2 25 01/05/2022    TSH 5.158 (H) 12/24/2021    PSA <0.1 05/27/2008    INR 1.0 11/22/2021    HGBA1C 5.2 12/24/2021           Health Maintenance  Health Maintenance Topics with due status: Not Due       Topic Last Completion Date    Pneumococcal Vaccines (Age 65+) 10/22/2018    TETANUS VACCINE 09/09/2020    Colorectal Cancer Screening 02/25/2021    Mammogram  07/19/2021    DEXA SCAN 08/03/2021    Lipid Panel 12/24/2021     Health Maintenance Due   Topic Date Due    Sign Pain Contract  Never done    Complete Opioid Risk Tool  Never done    COVID-19 Vaccine (3 - Moderna risk 4-dose series) 11/25/2021       Assessment:  Problem List Items Addressed This Visit        Cardiac/Vascular    Essential hypertension     Controlled.   Wants to reduce pill burden.   Incr carvedilol to 6.25 BID, stop amlodipine.   Clonidine PRN. Continue losartan.   Monitor home BP           Aortic atherosclerosis     Continue HTN mgmt.  Consider resuming statin.            ID    C. difficile colitis     Recurrent. Taper Vancomycin.         Relevant Medications    vancomycin (VANCOCIN) 125 MG capsule       Immunology/Multi System    Immunocompromised patient     R/T cyclosporin.   Continue POC.            Oncology    Ductal carcinoma in situ (DCIS) of left breast     Continue f/u with oncology and surgeon.            Endocrine    Hyperparathyroidism     Lab Results   Component Value Date    .0 (H) 03/09/2021    CALCIUM 9.5 01/05/2022    CAION 1.13 09/05/2018    PHOS 4.3 03/09/2021     [place] POC and monitor.               GI    Gastroesophageal reflux disease without esophagitis (Chronic)     Improved with pantoprazole.             Other    Immunosuppression due to drug therapy     Cyclosporin s/p remote heart transplant.   Continue POC and monitor.            Other Visit Diagnoses     Pain    -  Primary        Vancomycin taper:  125 mg 4 times daily for 10 to 14 days, then 125 mg twice daily for 7 days, then 125 mg once daily for 7 days, then 125 mg every 2 or 3 days for 4    Med review with pt done. Se desires to decrease pill burden. consider baclofen PRN.   Stop amlodipine, increase dose of carvedilol, HR low 100s.     No further dyspepsia since changing to pantoprazole.     On duloxetine for years. QTc elevated so no escitalopram.           Plan:  Pain    Ductal carcinoma in situ  (DCIS) of left breast    C. difficile colitis  -     vancomycin (VANCOCIN) 125 MG capsule; Take 1 capsule (125 mg total) by mouth 4 (four) times daily for 7 days, THEN 1 capsule (125 mg total) 2 (two) times a day for 7 days, THEN 1 capsule (125 mg total) once daily for 7 days, THEN 1 capsule (125 mg total) every other day for 14 days, THEN 1 capsule (125 mg total) Every 3 (three) days for 14 days.  Dispense: 60 capsule; Refill: 0    Essential hypertension    Aortic atherosclerosis    Immunocompromised patient    Hyperparathyroidism    Gastroesophageal reflux disease without esophagitis    Immunosuppression due to drug therapy      -Patient's lab results were reviewed and discussed with patient  -Treatment options and alternatives were discussed with the patient. Patient expressed understanding. Patient was given the opportunity to ask questions and be an active participant in their medical care. Patient had no further questions or concerns at this time.   -Documentation of patient's health and condition was obtained from family member who was present during visit.  -Patient is an overall moderate risk for health complications from their medical conditions.       Follow up: Follow up in about 1 day (around 1/11/2022).      After visit summary printed and given to patient upon discharge.  Patient goals and care plan are included in After visit summary.    Total medical decision making time was 43 min.  The following issues were discussed: The primary encounter diagnosis was Pain. Diagnoses of Ductal carcinoma in situ (DCIS) of left breast, C. difficile colitis, Essential hypertension, Aortic atherosclerosis, Immunocompromised patient, Hyperparathyroidism, Gastroesophageal reflux disease without esophagitis, and Immunosuppression due to drug therapy were also pertinent to this visit.    Health maintenance needs, recent test results and goals of care discussed with pt and questions answered.

## 2022-01-10 NOTE — ASSESSMENT & PLAN NOTE
Lab Results   Component Value Date    .0 (H) 03/09/2021    CALCIUM 9.5 01/05/2022    CAION 1.13 09/05/2018    PHOS 4.3 03/09/2021     [place] POC and monitor.      normal...

## 2022-01-11 ENCOUNTER — LAB VISIT (OUTPATIENT)
Dept: LAB | Facility: HOSPITAL | Age: 67
End: 2022-01-11
Payer: MEDICARE

## 2022-01-11 ENCOUNTER — CLINICAL SUPPORT (OUTPATIENT)
Dept: INTERNAL MEDICINE | Facility: CLINIC | Age: 67
End: 2022-01-11
Payer: MEDICARE

## 2022-01-11 DIAGNOSIS — L03.112 CELLULITIS OF AXILLA, LEFT: ICD-10-CM

## 2022-01-11 DIAGNOSIS — R52 PAIN: ICD-10-CM

## 2022-01-11 DIAGNOSIS — I50.32 CHRONIC DIASTOLIC HEART FAILURE: ICD-10-CM

## 2022-01-11 DIAGNOSIS — N18.4 CKD (CHRONIC KIDNEY DISEASE) STAGE 4, GFR 15-29 ML/MIN: ICD-10-CM

## 2022-01-11 DIAGNOSIS — I50.32 CHRONIC DIASTOLIC HEART FAILURE: Primary | ICD-10-CM

## 2022-01-11 DIAGNOSIS — R22.32 LOCALIZED SWELLING, MASS AND LUMP, LEFT UPPER LIMB: ICD-10-CM

## 2022-01-11 DIAGNOSIS — D05.12 DUCTAL CARCINOMA IN SITU (DCIS) OF LEFT BREAST: ICD-10-CM

## 2022-01-11 LAB
ALBUMIN SERPL BCP-MCNC: 3.5 G/DL (ref 3.5–5.2)
ALBUMIN SERPL BCP-MCNC: 3.5 G/DL (ref 3.5–5.2)
ALP SERPL-CCNC: 108 U/L (ref 55–135)
ALT SERPL W/O P-5'-P-CCNC: 16 U/L (ref 10–44)
ANION GAP SERPL CALC-SCNC: 14 MMOL/L (ref 8–16)
AST SERPL-CCNC: 26 U/L (ref 10–40)
BASOPHILS # BLD AUTO: 0.01 K/UL (ref 0–0.2)
BASOPHILS NFR BLD: 0.3 % (ref 0–1.9)
BILIRUB DIRECT SERPL-MCNC: 0.3 MG/DL (ref 0.1–0.3)
BILIRUB SERPL-MCNC: 0.6 MG/DL (ref 0.1–1)
BUN SERPL-MCNC: 36 MG/DL (ref 8–23)
CALCIUM SERPL-MCNC: 9.5 MG/DL (ref 8.7–10.5)
CHLORIDE SERPL-SCNC: 102 MMOL/L (ref 95–110)
CO2 SERPL-SCNC: 25 MMOL/L (ref 23–29)
CREAT SERPL-MCNC: 2.3 MG/DL (ref 0.5–1.4)
DIFFERENTIAL METHOD: ABNORMAL
EOSINOPHIL # BLD AUTO: 0.1 K/UL (ref 0–0.5)
EOSINOPHIL NFR BLD: 2.9 % (ref 0–8)
ERYTHROCYTE [DISTWIDTH] IN BLOOD BY AUTOMATED COUNT: 15 % (ref 11.5–14.5)
EST. GFR  (AFRICAN AMERICAN): 24.6 ML/MIN/1.73 M^2
EST. GFR  (NON AFRICAN AMERICAN): 21.3 ML/MIN/1.73 M^2
GLUCOSE SERPL-MCNC: 99 MG/DL (ref 70–110)
HCT VFR BLD AUTO: 25.9 % (ref 37–48.5)
HGB BLD-MCNC: 7.9 G/DL (ref 12–16)
IMM GRANULOCYTES # BLD AUTO: 0.04 K/UL (ref 0–0.04)
IMM GRANULOCYTES NFR BLD AUTO: 1.1 % (ref 0–0.5)
LYMPHOCYTES # BLD AUTO: 0.8 K/UL (ref 1–4.8)
LYMPHOCYTES NFR BLD: 22.5 % (ref 18–48)
MCH RBC QN AUTO: 28.2 PG (ref 27–31)
MCHC RBC AUTO-ENTMCNC: 30.5 G/DL (ref 32–36)
MCV RBC AUTO: 93 FL (ref 82–98)
MONOCYTES # BLD AUTO: 0.5 K/UL (ref 0.3–1)
MONOCYTES NFR BLD: 13.1 % (ref 4–15)
NEUTROPHILS # BLD AUTO: 2.3 K/UL (ref 1.8–7.7)
NEUTROPHILS NFR BLD: 60.1 % (ref 38–73)
NRBC BLD-RTO: 1 /100 WBC
PHOSPHATE SERPL-MCNC: 4.3 MG/DL (ref 2.7–4.5)
PLATELET # BLD AUTO: 144 K/UL (ref 150–450)
PMV BLD AUTO: 10.7 FL (ref 9.2–12.9)
POTASSIUM SERPL-SCNC: 5.2 MMOL/L (ref 3.5–5.1)
PROT SERPL-MCNC: 7.2 G/DL (ref 6–8.4)
RBC # BLD AUTO: 2.8 M/UL (ref 4–5.4)
SODIUM SERPL-SCNC: 141 MMOL/L (ref 136–145)
WBC # BLD AUTO: 3.74 K/UL (ref 3.9–12.7)

## 2022-01-11 PROCEDURE — 36415 COLL VENOUS BLD VENIPUNCTURE: CPT | Mod: HCNC | Performed by: NURSE PRACTITIONER

## 2022-01-11 PROCEDURE — 84075 ASSAY ALKALINE PHOSPHATASE: CPT | Mod: HCNC | Performed by: NURSE PRACTITIONER

## 2022-01-11 PROCEDURE — 85025 COMPLETE CBC W/AUTO DIFF WBC: CPT | Mod: HCNC | Performed by: NURSE PRACTITIONER

## 2022-01-11 PROCEDURE — 99999 PR PBB SHADOW E&M-EST. PATIENT-LVL I: CPT | Mod: PBBFAC,HCNC,,

## 2022-01-11 PROCEDURE — 99999 PR PBB SHADOW E&M-EST. PATIENT-LVL I: ICD-10-PCS | Mod: PBBFAC,HCNC,,

## 2022-01-11 PROCEDURE — 80069 RENAL FUNCTION PANEL: CPT | Mod: HCNC | Performed by: NURSE PRACTITIONER

## 2022-01-12 ENCOUNTER — HOSPITAL ENCOUNTER (OUTPATIENT)
Dept: RADIOLOGY | Facility: HOSPITAL | Age: 67
Discharge: HOME | End: 2022-01-12
Attending: NURSE PRACTITIONER
Payer: MEDICARE

## 2022-01-12 ENCOUNTER — OFFICE VISIT (OUTPATIENT)
Dept: NEPHROLOGY | Facility: CLINIC | Age: 67
End: 2022-01-12
Payer: MEDICARE

## 2022-01-12 ENCOUNTER — TELEPHONE (OUTPATIENT)
Dept: PRIMARY CARE CLINIC | Facility: CLINIC | Age: 67
End: 2022-01-12
Payer: MEDICARE

## 2022-01-12 VITALS
HEIGHT: 62 IN | DIASTOLIC BLOOD PRESSURE: 90 MMHG | HEART RATE: 90 BPM | BODY MASS INDEX: 30.34 KG/M2 | SYSTOLIC BLOOD PRESSURE: 150 MMHG | WEIGHT: 164.88 LBS

## 2022-01-12 DIAGNOSIS — N18.4 ANEMIA IN STAGE 4 CHRONIC KIDNEY DISEASE: Primary | ICD-10-CM

## 2022-01-12 DIAGNOSIS — R52 PAIN: ICD-10-CM

## 2022-01-12 DIAGNOSIS — D05.12 DUCTAL CARCINOMA IN SITU (DCIS) OF LEFT BREAST: ICD-10-CM

## 2022-01-12 DIAGNOSIS — D63.1 ANEMIA IN STAGE 4 CHRONIC KIDNEY DISEASE: Primary | ICD-10-CM

## 2022-01-12 DIAGNOSIS — D84.9 IMMUNOCOMPROMISED PATIENT: Primary | ICD-10-CM

## 2022-01-12 DIAGNOSIS — R22.32 LOCALIZED SWELLING, MASS AND LUMP, LEFT UPPER LIMB: ICD-10-CM

## 2022-01-12 DIAGNOSIS — L03.112 CELLULITIS OF AXILLA, LEFT: ICD-10-CM

## 2022-01-12 PROCEDURE — 99999 PR PBB SHADOW E&M-EST. PATIENT-LVL IV: ICD-10-PCS | Mod: PBBFAC,HCNC,, | Performed by: INTERNAL MEDICINE

## 2022-01-12 PROCEDURE — 99215 OFFICE O/P EST HI 40 MIN: CPT | Mod: HCNC,S$GLB,, | Performed by: INTERNAL MEDICINE

## 2022-01-12 PROCEDURE — 99499 UNLISTED E&M SERVICE: CPT | Mod: HCNC,S$GLB,, | Performed by: INTERNAL MEDICINE

## 2022-01-12 PROCEDURE — 99499 RISK ADDL DX/OHS AUDIT: ICD-10-PCS | Mod: HCNC,S$GLB,, | Performed by: INTERNAL MEDICINE

## 2022-01-12 PROCEDURE — 1126F AMNT PAIN NOTED NONE PRSNT: CPT | Mod: HCNC,CPTII,S$GLB, | Performed by: INTERNAL MEDICINE

## 2022-01-12 PROCEDURE — 3080F PR MOST RECENT DIASTOLIC BLOOD PRESSURE >= 90 MM HG: ICD-10-PCS | Mod: HCNC,CPTII,S$GLB, | Performed by: INTERNAL MEDICINE

## 2022-01-12 PROCEDURE — 3008F PR BODY MASS INDEX (BMI) DOCUMENTED: ICD-10-PCS | Mod: HCNC,CPTII,S$GLB, | Performed by: INTERNAL MEDICINE

## 2022-01-12 PROCEDURE — 1159F MED LIST DOCD IN RCRD: CPT | Mod: HCNC,CPTII,S$GLB, | Performed by: INTERNAL MEDICINE

## 2022-01-12 PROCEDURE — 3288F FALL RISK ASSESSMENT DOCD: CPT | Mod: HCNC,CPTII,S$GLB, | Performed by: INTERNAL MEDICINE

## 2022-01-12 PROCEDURE — 99215 PR OFFICE/OUTPT VISIT, EST, LEVL V, 40-54 MIN: ICD-10-PCS | Mod: HCNC,S$GLB,, | Performed by: INTERNAL MEDICINE

## 2022-01-12 PROCEDURE — 76882 US LMTD JT/FCL EVL NVASC XTR: CPT | Mod: 26,HCNC,LT, | Performed by: RADIOLOGY

## 2022-01-12 PROCEDURE — 76882 US LMTD JT/FCL EVL NVASC XTR: CPT | Mod: TC,HCNC,LT

## 2022-01-12 PROCEDURE — 3077F SYST BP >= 140 MM HG: CPT | Mod: HCNC,CPTII,S$GLB, | Performed by: INTERNAL MEDICINE

## 2022-01-12 PROCEDURE — 3080F DIAST BP >= 90 MM HG: CPT | Mod: HCNC,CPTII,S$GLB, | Performed by: INTERNAL MEDICINE

## 2022-01-12 PROCEDURE — 1157F ADVNC CARE PLAN IN RCRD: CPT | Mod: HCNC,CPTII,S$GLB, | Performed by: INTERNAL MEDICINE

## 2022-01-12 PROCEDURE — 3066F PR DOCUMENTATION OF TREATMENT FOR NEPHROPATHY: ICD-10-PCS | Mod: HCNC,CPTII,S$GLB, | Performed by: INTERNAL MEDICINE

## 2022-01-12 PROCEDURE — 1101F PT FALLS ASSESS-DOCD LE1/YR: CPT | Mod: HCNC,CPTII,S$GLB, | Performed by: INTERNAL MEDICINE

## 2022-01-12 PROCEDURE — 3288F PR FALLS RISK ASSESSMENT DOCUMENTED: ICD-10-PCS | Mod: HCNC,CPTII,S$GLB, | Performed by: INTERNAL MEDICINE

## 2022-01-12 PROCEDURE — 1159F PR MEDICATION LIST DOCUMENTED IN MEDICAL RECORD: ICD-10-PCS | Mod: HCNC,CPTII,S$GLB, | Performed by: INTERNAL MEDICINE

## 2022-01-12 PROCEDURE — 1101F PR PT FALLS ASSESS DOC 0-1 FALLS W/OUT INJ PAST YR: ICD-10-PCS | Mod: HCNC,CPTII,S$GLB, | Performed by: INTERNAL MEDICINE

## 2022-01-12 PROCEDURE — 3077F PR MOST RECENT SYSTOLIC BLOOD PRESSURE >= 140 MM HG: ICD-10-PCS | Mod: HCNC,CPTII,S$GLB, | Performed by: INTERNAL MEDICINE

## 2022-01-12 PROCEDURE — 3008F BODY MASS INDEX DOCD: CPT | Mod: HCNC,CPTII,S$GLB, | Performed by: INTERNAL MEDICINE

## 2022-01-12 PROCEDURE — 3066F NEPHROPATHY DOC TX: CPT | Mod: HCNC,CPTII,S$GLB, | Performed by: INTERNAL MEDICINE

## 2022-01-12 PROCEDURE — 76882 US SOFT TISSUE AXILLA, LEFT: ICD-10-PCS | Mod: 26,HCNC,LT, | Performed by: RADIOLOGY

## 2022-01-12 PROCEDURE — 99999 PR PBB SHADOW E&M-EST. PATIENT-LVL IV: CPT | Mod: PBBFAC,HCNC,, | Performed by: INTERNAL MEDICINE

## 2022-01-12 PROCEDURE — 1126F PR PAIN SEVERITY QUANTIFIED, NO PAIN PRESENT: ICD-10-PCS | Mod: HCNC,CPTII,S$GLB, | Performed by: INTERNAL MEDICINE

## 2022-01-12 PROCEDURE — 1157F PR ADVANCE CARE PLAN OR EQUIV PRESENT IN MEDICAL RECORD: ICD-10-PCS | Mod: HCNC,CPTII,S$GLB, | Performed by: INTERNAL MEDICINE

## 2022-01-12 NOTE — PROGRESS NOTES
NEPHROLOGY CLINIC FOLLOWUP NOTE  Date of clinic visit: 1/12/22     REASON FOR FOLLOWUP AND CHIEF COMPLAINT: nephrotic syndrome, CKD post heart transplant, HTN, nephrolithiasis, hypercalcemia     HISTORY OF PRESENT ILLNESS: Ms. Damon is a 65-year-old female with a history of CKD stage 4, nephrolithiasis (likely mixed stones), and heart transplant in 1993 (is on cyclosporine), who presents for f/u. 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 She was last seen by us in March 2021. Chart was reviewed. Noted pt has been diagnosed with breast cancer since her last visit with us (she did have persistent mild hypercalcemia in the past). Hem/onc notes were reviewed. 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 Pt has had further complication, requiring hospital admission including pancytopenia, neutropenic fever, axillary cellulitis, and C diff colitis.      On f/u today, pt reports no new c/o's, no SOB, does have leg swelling, no fever, no abd pain. Denies current diarrhea.     To review: Past mild, persistent hypercalcemia reviewed. Previously suspected related to primary hyperparathyroidism (HPT) is suspected. Sestamibi nuclear scan of the neck did not show any adenomas. Her risk factors for kidney stones include hypercalcemia and being on cyclosporine for prevention of heart transplant rejection, which causes hyperuricemia, and prior intake of vitamin C.. Pt has a h/o of osteopenia.             PAST MEDICAL HISTORY:  1. CKD stage IV. Nephrotic syndrome. Due to chronic calcineurin inhibitor toxicity due to taking cyclosporine, kidney biopsy was done on 11/10/21  2. Hypertension.  3. Heart transplant for cardiomyopathy in 1993, the patient has been on chronic  cyclosporine treatment.  4. Carpal tunnel syndrome.  5.  Fibromyalgia.  6. GERD.  7. Bell's palsy.  8. Depression.     PAST SURGICAL HISTORY: Reviewed as above, unchanged.     MEDICATIONS: Reviewed    Current Outpatient Medications:     acetaminophen (TYLENOL) 325 MG tablet, Take 1 tablet (325 mg total) by mouth every 6 (six) hours as needed for Pain., Disp: , Rfl: 0    aspirin (ECOTRIN) 81 MG EC tablet, Take 1 tablet (81 mg total) by mouth once daily., Disp: 90 tablet, Rfl: 3    biotin 10,000 mcg Cap, Take 1 tablet by mouth once daily., Disp: , Rfl:     busPIRone (BUSPAR) 10 MG tablet, Take 1 tablet (10 mg total) by mouth once daily., Disp: 90 tablet, Rfl: 3    calcitonin, salmon, (FORTICAL) 200 unit/actuation nasal spray, 1 spray by Nasal route once daily., Disp: 3.7 mL, Rfl: 3    carvediloL (COREG) 6.25 MG tablet, Take 1 tablet (6.25 mg total) by mouth 2 (two) times daily., Disp: 180 tablet, Rfl: 3    cloNIDine (CATAPRES) 0.1 MG tablet, Take 1 tablet (0.1 mg total) by mouth nightly as needed (BP >150/95)., Disp: 90 tablet, Rfl: 3    cycloSPORINE modified, NEORAL, (NEORAL) 25 MG capsule, Take 3 capsules (75 mg total) by mouth 2 (two) times daily., Disp: 270 capsule, Rfl: 11    DULoxetine (CYMBALTA) 30 MG capsule, TAKE 1 CAPSULE EVERY DAY, Disp: 90 capsule, Rfl: 1    EVENING PRIMROSE OIL ORAL, Take 1,000 mg by mouth once daily., Disp: , Rfl:     ferrous sulfate (FEOSOL) 325 mg (65 mg iron) Tab tablet, Take 1 tablet (325 mg total) by mouth once daily., Disp: 100 tablet, Rfl: 3    furosemide (LASIX) 20 MG tablet, Take 1 tablet (20 mg total) by mouth 2 (two) times a day for 5 days, THEN 1 tablet (20 mg total) once daily. Take 1 tablet daily., Disp: 370 tablet, Rfl: 0    hydrALAZINE (APRESOLINE) 50 MG tablet, TAKE 2 TABLETS  EVERY 8  HOURS., Disp: 540 tablet, Rfl: 3    HYDROcodone-acetaminophen (NORCO) 7.5-325 mg per tablet, Take 1 tablet by mouth every 6 (six) hours as needed for Pain., Disp: 28 tablet, Rfl: 0    LIDOCAINE VISCOUS 2 % solution, SWISH AND SPIT  10 MLS EVERY 4 (FOUR) HOURS AS NEEDED (MOUTH&/OR THROAT PAIN). FOR MOUTH SORES, Disp: 450 each, Rfl: 11    LIDOcaine-prilocaine (EMLA) cream, Apply topically as needed., Disp: 30 g, Rfl: 2    losartan (COZAAR) 25 MG tablet, Take 1 tablet (25 mg total) by mouth once daily., Disp: 90 tablet, Rfl: 3    multivitamin capsule, Take 1 capsule by mouth once daily., Disp: , Rfl:     ondansetron (ZOFRAN-ODT) 8 MG TbDL, Take 1 tablet (8 mg total) by mouth every 8 (eight) hours as needed (nausea)., Disp: 60 tablet, Rfl: 5    pantoprazole (PROTONIX) 40 MG tablet, Take 1 tablet (40 mg total) by mouth once daily., Disp: 90 tablet, Rfl: 1    pregabalin (LYRICA) 50 MG capsule, Take 1 capsule (50 mg total) by mouth 2 (two) times daily. NOON & NIGHT TIME, Disp: 180 capsule, Rfl: 1    temazepam (RESTORIL) 30 mg capsule, Take 1 at bedtime, Disp: 90 capsule, Rfl: 1    vancomycin (VANCOCIN) 125 MG capsule, Take 1 capsule (125 mg total) by mouth 4 (four) times daily for 7 days, THEN 1 capsule (125 mg total) 2 (two) times a day for 7 days, THEN 1 capsule (125 mg total) once daily for 7 days, THEN 1 capsule (125 mg total) every other day for 14 days, THEN 1 capsule (125 mg total) Every 3 (three) days for 14 days., Disp: 60 capsule, Rfl: 0    vitamin E 400 UNIT capsule, Take 400 Units by mouth once daily., Disp: , Rfl:     zolpidem (AMBIEN) 10 mg Tab, TAKE 1 TABLET BY MOUTH ONCE DAILY IN THE EVENING, Disp: 90 tablet, Rfl: 1    Current Facility-Administered Medications:     denosumab (PROLIA) injection 60 mg, 60 mg, Subcutaneous, Q6 Months, Prasanth Johnson MD    Facility-Administered Medications Ordered in Other Visits:     lactated ringers infusion, , Intravenous, Continuous, Jennifer Carr MD, Last Rate: 10 mL/hr at 11/09/21 0717, Restarted at 11/09/21 0820         SOCIAL HISTORY: Reviewed. Negative for smoking. No alcohol use.      REVIEW OF SYSTEMS: No recent hospitalizations.  GENERAL: Negative.  HEAD, EYES, EARS, NOSE, AND  THROAT: Negative.  CARDIAC: Negative.  PULMONARY: Negative.  GASTROINTESTINAL: Negative.  GENITOURINARY: Negative.  PSYCHOLOGICAL: Negative.  NEUROLOGICAL: Negative.  ENDOCRINE: Negative.  HEMATOLOGIC AND ONCOLOGIC: Negative.  INFECTIOUS DISEASE: Negative.  The rest of the review of systems negative.     PHYSICAL EXAMINATION:  VITAL SIGNS: Repeat blood pressure is 150/90, was normal in Dec 2021, Pulse is 70, weight is 74.8 Kg, last visit 77.6 Kg  GENERAL: She is cooperative, pleasant, in no acute distress.   Speech and thought process appropriate, normal.  HEENT: Mucous membranes moist.  NECK: Neck JVD. Normal hearing.  ABDOMEN: Soft, nontender.  EXTREMITIES: trace pitting edema.     LABS: Reviewed.      BMP  Lab Results   Component Value Date     01/11/2022    K 5.2 (H) 01/11/2022     01/11/2022    CO2 25 01/11/2022    BUN 36 (H) 01/11/2022    CREATININE 2.3 (H) 01/11/2022    CALCIUM 9.5 01/11/2022    ANIONGAP 14 01/11/2022    ESTGFRAFRICA 24.6 (A) 01/11/2022    EGFRNONAA 21.3 (A) 01/11/2022     Lab Results   Component Value Date    WBC 3.74 (L) 01/11/2022    HGB 7.9 (L) 01/11/2022    HCT 25.9 (L) 01/11/2022    MCV 93 01/11/2022     (L) 01/11/2022       Lab Results   Component Value Date    .0 (H) 03/09/2021    CALCIUM 9.5 01/11/2022    CAION 1.13 09/05/2018    PHOS 4.3 01/11/2022       Lab Results   Component Value Date     .0 (H) 03/09/2021     CALCIUM 8.9 03/09/2021     CAION 1.13 09/05/2018     PHOS 4.3 03/09/2021         Urine protein/cr 0.49 g, from 2.2 g, 6.0 g  U/a: 3+ protein, no blood, 4 RBC's, no casts     Complements C3 and C4 both normal     To review older labs:   24 hour urine: Ca not measured, uric acid 138, Oxalate 20, citrate 203  U/a unremarkable  Urine Cx: neg   Urine P/Cr 740 mg     KUB: unremarkable, except for some constipation     Renal u/s: FINDINGS: 10/22/20  Kidneys measure 10.3 and 9.7 cm in length on the right left respectively.  Cortex is smoothly  marginated well maintained with mild diffuse increased echotexture.  Appearance suggest medical renal disease.  Two simple cyst identified within the right kidney.  Upper pole cyst 1.4 cm maximum diameter and inferior pole cyst 2.2 cm maximum diameter.  There is 9 mm simple cyst within the left inferior pole.  Resistive indices are 0.66 and 0.76 on the right left respectively.  Urinary bladder partially distended without focal or diffuse wall thickening.     CT abd: The left kidney appears slightly small.  Right kidney is grossly normal in size.  No obvious hydronephrosis or nephrolithiasis     Sestamibi nuclear scan of the neck: 8/29/19: no adenomas        Kidney biopsy from 11/10/21:                   ASSESSMENT AND PLAN: This is a 66-year-old female who presents for follow up after a kidney biopsy for nephrotic syndrome and slowly progressive CKD:  Patient has a h/o of heart transplant  The impression is as follows:     1. Renal: s Cr overall stable, slowly progressive CKD  Stage 4 CKD  Nephrotic syndrome: good response to losartan: proteinuria improved from 6 to 2.2 g, and now to < 1 g  BP controlled   K is normal to borderline elevated. Continue lasix.  Normal C3 and C4 complements    Kidney biopsy showed:  Damage by HTN (hypertensive nephrosclerosis) as well as calcineurin inhibitor toxicity due to taking cyclosporine to prevent heart transplant rejection. There was 50% chronic interstitial fibrosis.  Pt has been advised of the kidney biopsy in layman's language  She was specifically advised NOT to stop cyclosporine of change the dose on her own.     2. HTN: BP controlled  Reviewed meds with pt     3. Nephrolithiasis: no new stones. To review:  Has multiple risk factors for kidney stones: (hyperuricemia from cyclosporine, diet rich in uric acid, previously taking Vit C, vit D, and calcium,, CKD, and having primary hyperparathyroidism)  Stones are likely mixed ca oxalate and uric acid kidney stones  No longer  taking Vit C  No longer takes Vit D and Ca.   Mild hyperuricemia, from cyclosporine.  F/u CT did not show any stones  U/s showed non-obstructive 2 R stones, relatively large  May have passed the stones  24 hour urine for kidney stone stratification shows hypocitraturia  Pt was advised NOT TO STOP cyclosporine.     4. History of heart transplant on cyclosporine  Per heart transplant team.  Do NOT stop cyclosporine      5. Elevated iPTH, likely has primary hyperparathyroidism (HPT)  No adenomas found on sestamibi nulcear scan  May have diffuse hyperplasia  Primary HPT can explain increased risk of kidney stones     6. New diagnosis of breast cancer: since her last visit with us (she did have persistent mild hypercalcemia in the past). Hem/onc notes were reviewed. 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 Pt has had further complication, requiring hospital admission including pancytopenia, neutropenic fever, axillary cellulitis, and C diff colitis.  Will defer to hem/onc    7. Anemia: normocytic. Likely multifactorial: due to CKD and cancer.  Advised pt to have PRBC transfusion. Last transfusion was in Nov 2021. Pt declined  Has f/u with hem/onc on 1/20/21. Please evaluate for epogen and IV iron therapy.            PLANS AND RECOMMENDATIONS:   As discussed above  Opportunity for questions provided  Complicated case, multiple issues addressed  RTC 6 months  Total time spent 40 minutes. Extensive time spent including the time to review the records, the patient evaluation, documentation, face-to-face  discussion with the patient. More than 50% of the time was spent in counseling  and coordination of care. Level V visit.     Carter Crawford MD

## 2022-01-12 NOTE — TELEPHONE ENCOUNTER
----- Message from Ning Bowen sent at 1/12/2022 10:07 AM CST -----  Contact: Nadia Zuniga called regarding a missed call about her test results, please give her a call back at [phone] (home)     Thanks  Kb

## 2022-01-19 NOTE — PROGRESS NOTES
Patient in office on 1/11/22 for her nurse visit wound check. Patient bandages were changed, pt verbally understood all information given.

## 2022-01-20 ENCOUNTER — LAB VISIT (OUTPATIENT)
Dept: LAB | Facility: HOSPITAL | Age: 67
End: 2022-01-20
Payer: MEDICARE

## 2022-01-20 ENCOUNTER — OFFICE VISIT (OUTPATIENT)
Dept: HEMATOLOGY/ONCOLOGY | Facility: CLINIC | Age: 67
End: 2022-01-20
Payer: MEDICARE

## 2022-01-20 VITALS
HEART RATE: 109 BPM | HEIGHT: 63 IN | WEIGHT: 169.31 LBS | BODY MASS INDEX: 30 KG/M2 | OXYGEN SATURATION: 99 % | SYSTOLIC BLOOD PRESSURE: 139 MMHG | DIASTOLIC BLOOD PRESSURE: 90 MMHG | TEMPERATURE: 97 F

## 2022-01-20 DIAGNOSIS — D84.9 IMMUNOCOMPROMISED PATIENT: ICD-10-CM

## 2022-01-20 DIAGNOSIS — I70.0 AORTIC ATHEROSCLEROSIS: ICD-10-CM

## 2022-01-20 DIAGNOSIS — F33.9 CHRONIC MAJOR DEPRESSIVE DISORDER, RECURRENT EPISODE: ICD-10-CM

## 2022-01-20 DIAGNOSIS — C77.3 SECONDARY AND UNSPECIFIED MALIGNANT NEOPLASM OF AXILLA AND UPPER LIMB LYMPH NODES: ICD-10-CM

## 2022-01-20 DIAGNOSIS — Z79.899 IMMUNODEFICIENCY DUE TO CHEMOTHERAPY: ICD-10-CM

## 2022-01-20 DIAGNOSIS — D84.821 IMMUNODEFICIENCY DUE TO CHEMOTHERAPY: ICD-10-CM

## 2022-01-20 DIAGNOSIS — D63.1 ANEMIA ASSOCIATED WITH STAGE 4 CHRONIC RENAL FAILURE: Primary | ICD-10-CM

## 2022-01-20 DIAGNOSIS — N18.4 CKD (CHRONIC KIDNEY DISEASE) STAGE 4, GFR 15-29 ML/MIN: ICD-10-CM

## 2022-01-20 DIAGNOSIS — T45.1X5A IMMUNODEFICIENCY DUE TO CHEMOTHERAPY: ICD-10-CM

## 2022-01-20 DIAGNOSIS — I10 ESSENTIAL HYPERTENSION: ICD-10-CM

## 2022-01-20 DIAGNOSIS — D05.12 DUCTAL CARCINOMA IN SITU (DCIS) OF LEFT BREAST: ICD-10-CM

## 2022-01-20 DIAGNOSIS — D84.822 IMMUNODEFICIENCY SECONDARY TO RADIATION THERAPY: ICD-10-CM
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
## 2022-01-20 DIAGNOSIS — Z94.1 HEART TRANSPLANTED: Chronic | ICD-10-CM

## 2022-01-20 DIAGNOSIS — N18.4 ANEMIA ASSOCIATED WITH STAGE 4 CHRONIC RENAL FAILURE: Primary | ICD-10-CM

## 2022-01-20 DIAGNOSIS — D61.811 DRUG-INDUCED PANCYTOPENIA: ICD-10-CM

## 2022-01-20 PROBLEM — D64.9 ANEMIA: Status: RESOLVED | Noted: 2021-12-13 | Resolved: 2022-01-20

## 2022-01-20 PROBLEM — D70.9 NEUTROPENIC FEVER: Status: RESOLVED | Noted: 2021-11-22 | Resolved: 2022-01-20

## 2022-01-20 PROBLEM — R50.81 NEUTROPENIC FEVER: Status: RESOLVED | Noted: 2021-11-22 | Resolved: 2022-01-20

## 2022-01-20 LAB
ALBUMIN SERPL BCP-MCNC: 3.4 G/DL (ref 3.5–5.2)
ALP SERPL-CCNC: 143 U/L (ref 55–135)
ALT SERPL W/O P-5'-P-CCNC: 28 U/L (ref 10–44)
ANION GAP SERPL CALC-SCNC: 12 MMOL/L (ref 8–16)
ANISOCYTOSIS BLD QL SMEAR: SLIGHT
AST SERPL-CCNC: 39 U/L (ref 10–40)
BASOPHILS # BLD AUTO: 0.03 K/UL (ref 0–0.2)
BASOPHILS NFR BLD: 0.7 % (ref 0–1.9)
BILIRUB SERPL-MCNC: 0.5 MG/DL (ref 0.1–1)
BUN SERPL-MCNC: 32 MG/DL (ref 8–23)
CALCIUM SERPL-MCNC: 8.8 MG/DL (ref 8.7–10.5)
CHLORIDE SERPL-SCNC: 108 MMOL/L (ref 95–110)
CO2 SERPL-SCNC: 24 MMOL/L (ref 23–29)
CREAT SERPL-MCNC: 2.6 MG/DL (ref 0.5–1.4)
DACRYOCYTES BLD QL SMEAR: ABNORMAL
DIFFERENTIAL METHOD: ABNORMAL
EOSINOPHIL # BLD AUTO: 0.1 K/UL (ref 0–0.5)
EOSINOPHIL NFR BLD: 3 % (ref 0–8)
ERYTHROCYTE [DISTWIDTH] IN BLOOD BY AUTOMATED COUNT: 15.9 % (ref 11.5–14.5)
EST. GFR  (AFRICAN AMERICAN): 21 ML/MIN/1.73 M^2
EST. GFR  (NON AFRICAN AMERICAN): 18 ML/MIN/1.73 M^2
GLUCOSE SERPL-MCNC: 121 MG/DL (ref 70–110)
HCT VFR BLD AUTO: 22.5 % (ref 37–48.5)
HGB BLD-MCNC: 7.2 G/DL (ref 12–16)
IMM GRANULOCYTES # BLD AUTO: 0.07 K/UL (ref 0–0.04)
IMM GRANULOCYTES NFR BLD AUTO: 1.6 % (ref 0–0.5)
LYMPHOCYTES # BLD AUTO: 1 K/UL (ref 1–4.8)
LYMPHOCYTES NFR BLD: 22.5 % (ref 18–48)
MCH RBC QN AUTO: 29.3 PG (ref 27–31)
MCHC RBC AUTO-ENTMCNC: 32 G/DL (ref 32–36)
MCV RBC AUTO: 92 FL (ref 82–98)
MONOCYTES # BLD AUTO: 0.8 K/UL (ref 0.3–1)
MONOCYTES NFR BLD: 17 % (ref 4–15)
NEUTROPHILS # BLD AUTO: 2.4 K/UL (ref 1.8–7.7)
NEUTROPHILS NFR BLD: 55.2 % (ref 38–73)
NRBC BLD-RTO: 1 /100 WBC
PLATELET # BLD AUTO: 171 K/UL (ref 150–450)
PMV BLD AUTO: 10.6 FL (ref 9.2–12.9)
POIKILOCYTOSIS BLD QL SMEAR: SLIGHT
POLYCHROMASIA BLD QL SMEAR: ABNORMAL
POTASSIUM SERPL-SCNC: 4.7 MMOL/L (ref 3.5–5.1)
PROT SERPL-MCNC: 7.4 G/DL (ref 6–8.4)
RBC # BLD AUTO: 2.46 M/UL (ref 4–5.4)
SODIUM SERPL-SCNC: 144 MMOL/L (ref 136–145)
WBC # BLD AUTO: 4.4 K/UL (ref 3.9–12.7)

## 2022-01-20 PROCEDURE — 99214 PR OFFICE/OUTPT VISIT, EST, LEVL IV, 30-39 MIN: ICD-10-PCS | Mod: HCNC,S$GLB,, | Performed by: INTERNAL MEDICINE

## 2022-01-20 PROCEDURE — 1160F RVW MEDS BY RX/DR IN RCRD: CPT | Mod: HCNC,CPTII,S$GLB, | Performed by: INTERNAL MEDICINE

## 2022-01-20 PROCEDURE — 80053 COMPREHEN METABOLIC PANEL: CPT | Mod: HCNC

## 2022-01-20 PROCEDURE — 1157F ADVNC CARE PLAN IN RCRD: CPT | Mod: HCNC,CPTII,S$GLB, | Performed by: INTERNAL MEDICINE

## 2022-01-20 PROCEDURE — 1157F PR ADVANCE CARE PLAN OR EQUIV PRESENT IN MEDICAL RECORD: ICD-10-PCS | Mod: HCNC,CPTII,S$GLB, | Performed by: INTERNAL MEDICINE

## 2022-01-20 PROCEDURE — 99499 RISK ADDL DX/OHS AUDIT: ICD-10-PCS | Mod: HCNC,S$GLB,, | Performed by: INTERNAL MEDICINE

## 2022-01-20 PROCEDURE — 99999 PR PBB SHADOW E&M-EST. PATIENT-LVL V: ICD-10-PCS | Mod: PBBFAC,HCNC,, | Performed by: INTERNAL MEDICINE

## 2022-01-20 PROCEDURE — 1159F PR MEDICATION LIST DOCUMENTED IN MEDICAL RECORD: ICD-10-PCS | Mod: HCNC,CPTII,S$GLB, | Performed by: INTERNAL MEDICINE

## 2022-01-20 PROCEDURE — 3288F PR FALLS RISK ASSESSMENT DOCUMENTED: ICD-10-PCS | Mod: HCNC,CPTII,S$GLB, | Performed by: INTERNAL MEDICINE

## 2022-01-20 PROCEDURE — 1159F MED LIST DOCD IN RCRD: CPT | Mod: HCNC,CPTII,S$GLB, | Performed by: INTERNAL MEDICINE

## 2022-01-20 PROCEDURE — 1101F PR PT FALLS ASSESS DOC 0-1 FALLS W/OUT INJ PAST YR: ICD-10-PCS | Mod: HCNC,CPTII,S$GLB, | Performed by: INTERNAL MEDICINE

## 2022-01-20 PROCEDURE — 99214 OFFICE O/P EST MOD 30 MIN: CPT | Mod: HCNC,S$GLB,, | Performed by: INTERNAL MEDICINE

## 2022-01-20 PROCEDURE — 1126F PR PAIN SEVERITY QUANTIFIED, NO PAIN PRESENT: ICD-10-PCS | Mod: HCNC,CPTII,S$GLB, | Performed by: INTERNAL MEDICINE

## 2022-01-20 PROCEDURE — 3080F DIAST BP >= 90 MM HG: CPT | Mod: HCNC,CPTII,S$GLB, | Performed by: INTERNAL MEDICINE

## 2022-01-20 PROCEDURE — 3008F PR BODY MASS INDEX (BMI) DOCUMENTED: ICD-10-PCS | Mod: HCNC,CPTII,S$GLB, | Performed by: INTERNAL MEDICINE

## 2022-01-20 PROCEDURE — 36415 COLL VENOUS BLD VENIPUNCTURE: CPT | Mod: HCNC

## 2022-01-20 PROCEDURE — 3008F BODY MASS INDEX DOCD: CPT | Mod: HCNC,CPTII,S$GLB, | Performed by: INTERNAL MEDICINE

## 2022-01-20 PROCEDURE — 3075F SYST BP GE 130 - 139MM HG: CPT | Mod: HCNC,CPTII,S$GLB, | Performed by: INTERNAL MEDICINE

## 2022-01-20 PROCEDURE — 3066F PR DOCUMENTATION OF TREATMENT FOR NEPHROPATHY: ICD-10-PCS | Mod: HCNC,CPTII,S$GLB, | Performed by: INTERNAL MEDICINE

## 2022-01-20 PROCEDURE — 99999 PR PBB SHADOW E&M-EST. PATIENT-LVL V: CPT | Mod: PBBFAC,HCNC,, | Performed by: INTERNAL MEDICINE

## 2022-01-20 PROCEDURE — 1101F PT FALLS ASSESS-DOCD LE1/YR: CPT | Mod: HCNC,CPTII,S$GLB, | Performed by: INTERNAL MEDICINE

## 2022-01-20 PROCEDURE — 3066F NEPHROPATHY DOC TX: CPT | Mod: HCNC,CPTII,S$GLB, | Performed by: INTERNAL MEDICINE

## 2022-01-20 PROCEDURE — 3080F PR MOST RECENT DIASTOLIC BLOOD PRESSURE >= 90 MM HG: ICD-10-PCS | Mod: HCNC,CPTII,S$GLB, | Performed by: INTERNAL MEDICINE

## 2022-01-20 PROCEDURE — 1126F AMNT PAIN NOTED NONE PRSNT: CPT | Mod: HCNC,CPTII,S$GLB, | Performed by: INTERNAL MEDICINE

## 2022-01-20 PROCEDURE — 85025 COMPLETE CBC W/AUTO DIFF WBC: CPT | Mod: HCNC

## 2022-01-20 PROCEDURE — 1160F PR REVIEW ALL MEDS BY PRESCRIBER/CLIN PHARMACIST DOCUMENTED: ICD-10-PCS | Mod: HCNC,CPTII,S$GLB, | Performed by: INTERNAL MEDICINE

## 2022-01-20 PROCEDURE — 99499 UNLISTED E&M SERVICE: CPT | Mod: HCNC,S$GLB,, | Performed by: INTERNAL MEDICINE

## 2022-01-20 PROCEDURE — 3288F FALL RISK ASSESSMENT DOCD: CPT | Mod: HCNC,CPTII,S$GLB, | Performed by: INTERNAL MEDICINE

## 2022-01-20 PROCEDURE — 3075F PR MOST RECENT SYSTOLIC BLOOD PRESS GE 130-139MM HG: ICD-10-PCS | Mod: HCNC,CPTII,S$GLB, | Performed by: INTERNAL MEDICINE

## 2022-01-20 NOTE — PROGRESS NOTES
Subjective:       Patient ID: Nadia Damon is a 66 y.o. female.    Chief Complaint: Results, Chronic Renal Failure, Anemia, and Breast Cancer    HPI 66-year-old female status post heart transplant return recent diagnosis of micro invasive breast carcinoma with involvement of lymph node status post 1 cycle of TC unable to complete any further cycles because of persistent pancytopenia secondary to chemotherapy.  At this point would recommend continuation of referral for    Past Medical History:   Diagnosis Date    Abdominal wall hernia     CT Renal 6/11/2018---Small fat containing superior ventral abdominal wall hernia at the epicardial pacing lead site.    Anxiety     Arthritis     ZEN HIPS    Breast cancer in female 08/2021    LEFT BREAST    Chronic kidney disease     stage 4, GFR 15-29 ml/min    Chronic midline low back pain without sciatica 6/18/2018    Coronary artery disease 1993    heart transplant    Depression     Fibromyalgia     on Lyrica    Heart failure     native heart cardiomyopathy    Heart transplanted 1993    due to cardiomyopathy    History of hyperparathyroidism; Hyperparathyroidism, secondary renal     PT DENIES    Hypertension     Immune disorder     anti rejection meds    Iron deficiency anemia 8/15/2017    Kidney stones     passed per pt    Obesity     Other osteoporosis without current pathological fracture 8/30/2019    Ela 2003 approx    left leg    Trouble in sleeping     Urinary incontinence      Family History   Problem Relation Age of Onset    Cancer Mother 38        breast    Breast cancer Mother     Heart disease Maternal Grandmother     Cataracts Cousin     Hypertension Son     Diabetes Neg Hx     Stroke Neg Hx     Kidney disease Neg Hx     Asthma Neg Hx     COPD Neg Hx     Melanoma Neg Hx     Hyperlipidemia Neg Hx      Social History     Socioeconomic History    Marital status: Single    Number of children: 2    Highest education level:  11th grade   Occupational History    Occupation: Retired   Tobacco Use    Smoking status: Never Smoker    Smokeless tobacco: Never Used   Substance and Sexual Activity    Alcohol use: Never     Alcohol/week: 0.0 standard drinks    Drug use: No    Sexual activity: Not Currently     Partners: Male     Birth control/protection: See Surgical Hx   Other Topics Concern    Are you pregnant or think you may be? No    Breast-feeding No   Social History Narrative    Single. 2 children , 1  at 30 yoa   strep throat -  pneumonia and renal complications after not completing course of AB. Other child lives in [place], [place]. Has a cousin locally that could help in an emergency. 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 Caffeine intake =- 1 cola a day. No coffee, + occasional tea, avoids caffeine especially at night. Still drives. She does not have a Living Will or Advanced directive.      Social Determinants of Health     Financial Resource Strain: Low Risk     Difficulty of Paying Living Expenses: Not hard at all   Food Insecurity: No Food Insecurity    Worried About Running Out of Food in the Last Year: Never true    Ran Out of Food in the Last Year: Never true   Transportation Needs: No Transportation Needs    Lack of Transportation (Medical): No    Lack of Transportation (Non-Medical): No   Physical Activity: Insufficiently Active    Days of Exercise per Week: 2 days    Minutes of Exercise per Session: 10 min   Stress: No Stress Concern Present    Feeling of Stress : Not at all   Social Connections: Socially Isolated    Frequency of Communication with Friends and Family: Once a week    Frequency of Social Gatherings with Friends and Family: Once a week    Attends Christianity Services: More than 4 times per year    Active Member of Clubs or Organizations: No    Attends Club or Organization Meetings: Never    Marital Status: Never    Housing Stability: Low Risk      Unable to Pay for Housing in the Last Year: No    Number of Places Lived in the Last Year: 1    Unstable Housing in the Last Year: No     Past Surgical History:   Procedure Laterality Date    BLADDER SURGERY  2015 approx    mesh - Dr Everett then 2nd reconstructive sx Dr Onofre    BREAST BIOPSY Bilateral     NEGATIVE    BREAST SURGERY Left 9/28/15    Bx - benign    BREAST SURGERY Right 12/2015    Bx benign    CARDIAC PACEMAKER REMOVAL Left 06/26/2014    Pacer defirillator removed. Put in 1993 aat time of heart transplant    CARPAL TUNNEL RELEASE Left 03/03/15    Dr. Hall    COLONOSCOPY N/A 2/25/2021    Procedure: COLONOSCOPY;  Surgeon: Freida Ramirez MD;  [facility];  Service: Endoscopy;  Laterality: N/A;    HEART TRANSPLANT  1993    HERNIA REPAIR Right 1971 approx    Inguinal    HYSTERECTOMY  1983    vag hyst /LSO     INCISION AND DRAINAGE OF ABSCESS Left 12/24/2021    Procedure: INCISION AND DRAINAGE, ABSCESS;  Surgeon: Joseph Longo MD;  Location: [facility];  Service: General;  Laterality: Left;    INSERTION OF TUNNELED CENTRAL VENOUS CATHETER (CVC) WITH SUBCUTANEOUS PORT N/A 11/9/2021    Procedure: [id number];  Surgeon: Christoph Douglas MD;  Location: [facility] OR;  Service: General;  Laterality: N/A;    SENTINEL LYMPH NODE BIOPSY Left 10/12/2021    Procedure: BIOPSY, LYMPH NODE, SENTINEL;  Surgeon: Christoph Douglas MD;  Location: [facility];  Service: General;  Laterality: Left;    TOE SURGERY         Labs:  Lab Results   Component Value Date    WBC 4.40 01/20/2022    HGB 7.2 (L) 01/20/2022    HCT 22.5 (L) 01/20/2022    MCV 92 01/20/2022     01/20/2022     BMP  Lab Results   Component Value Date     01/20/2022    K 4.7 01/20/2022     01/20/2022    CO2 24 01/20/2022    BUN 32 (H) 01/20/2022    CREATININE 2.6 (H) 01/20/2022    CALCIUM 8.8 01/20/2022    ANIONGAP 12 01/20/2022    ESTGFRAFRICA 21 (A) 01/20/2022    EGFRNONAA 18 (A) 01/20/2022     Lab Results    Component Value Date    ALT 28 01/20/2022    AST 39 01/20/2022    ALKPHOS 143 (H) 01/20/2022    BILITOT 0.5 01/20/2022       Lab Results   Component Value Date    IRON 46 12/27/2021    TIBC 216 (L) 12/27/2021    FERRITIN 834 (H) 12/27/2021     No results found for: [id number]  No results found for: FOLATE  Lab Results   Component Value Date    TSH 5.158 (H) 12/24/2021         Review of Systems    Objective:      Physical Exam        Assessment:      1. Anemia associated with stage 4 chronic renal failure    2. Secondary and unspecified malignant neoplasm of axilla and upper limb lymph nodes    3. Chronic major depressive disorder, recurrent episode    4. Drug-induced pancytopenia    5. CKD (chronic kidney disease) stage 4, GFR 15-29 ml/min    6. Aortic atherosclerosis    7. Essential hypertension    8. Immunodeficiency due to chemotherapy    9. Immunodeficiency secondary to radiation therapy    10. Immunocompromised patient    11. Heart transplanted           Plan:      Documented anemia secondary to chronic renal failure iron repleted will proceed with erythropoietin 87729 units Q 2 weeks x3 doses see back for response would like to continue with hemoglobin between 10 and 11 g. Triple negative breast carcinoma will make referral back to Radiation Oncology to assess for primary breast irradiation..  MediPort can be removed        Juan Collins Jr, MD FACP

## 2022-01-24 ENCOUNTER — DOCUMENT SCAN (OUTPATIENT)
Dept: HOME HEALTH SERVICES | Facility: HOSPITAL | Age: 67
End: 2022-01-24
Payer: MEDICARE

## 2022-01-24 ENCOUNTER — PROCEDURE VISIT (OUTPATIENT)
Dept: SURGERY | Facility: CLINIC | Age: 67
End: 2022-01-24
Payer: MEDICARE

## 2022-01-24 VITALS
BODY MASS INDEX: 29.02 KG/M2 | TEMPERATURE: 98 F | SYSTOLIC BLOOD PRESSURE: 137 MMHG | WEIGHT: 163.81 LBS | HEART RATE: 105 BPM | DIASTOLIC BLOOD PRESSURE: 89 MMHG

## 2022-01-24 DIAGNOSIS — Z95.828 PORT-A-CATH IN PLACE: Primary | ICD-10-CM

## 2022-01-24 NOTE — PROCEDURES
"Exc, Tumor Soft Tissue    Date/Time: 1/24/2022 11:20 AM  Performed by: Christoph Douglas MD  Authorized by: Christoph Douglas MD     Consent Done?:  Yes (Written)  Time out: Immediately prior to procedure a "time out" was called to verify the correct patient, procedure, equipment, support staff and site/side marked as required.      STAFF:  Assistants?: No    I was present for the entire procedure.    INDICATIONS:: port a cath.  LOCATION:(Right)    PREP:  Patient was prepped and draped in the normal sterile fashion.  Position:  Supine    ANESTHESIA:  Anesthesia:  Local infiltration  Local anesthetic:  Lidocaine 1% with epinephrine  Anesthetic total: 10mL    PROCEDURE DETAILS:  Excision size (cm):  3  Scalpel size:  15  Incision type:  Single straight  Specimens?: Yes    Hemostasis was obtained.  Estimated blood loss (cc):  0  Wound closure:  Intermediate layered  Wound repair size (cm):  3  Sutures:  3-0 Vicryl and 4-0 Monocryl  Post-op diagnosis: Same as pre-op diagnosis.  Needle, instrument, and sponge counts were correct.  Patient tolerated the procedure well with no immediate complications.  Post-operative instructions were provided for the patient.      "
Negative

## 2022-01-25 ENCOUNTER — OUTPATIENT CASE MANAGEMENT (OUTPATIENT)
Dept: ADMINISTRATIVE | Facility: OTHER | Age: 67
End: 2022-01-25
Payer: MEDICARE

## 2022-01-25 ENCOUNTER — OFFICE VISIT (OUTPATIENT)
Dept: PRIMARY CARE CLINIC | Facility: CLINIC | Age: 67
End: 2022-01-25
Payer: MEDICARE

## 2022-01-25 VITALS
SYSTOLIC BLOOD PRESSURE: 140 MMHG | TEMPERATURE: 98 F | OXYGEN SATURATION: 98 % | HEART RATE: 107 BPM | WEIGHT: 163.81 LBS | DIASTOLIC BLOOD PRESSURE: 84 MMHG | HEIGHT: 63 IN | BODY MASS INDEX: 29.02 KG/M2

## 2022-01-25 DIAGNOSIS — D05.12 DUCTAL CARCINOMA IN SITU (DCIS) OF LEFT BREAST: ICD-10-CM

## 2022-01-25 DIAGNOSIS — R19.7 DIARRHEA, UNSPECIFIED TYPE: ICD-10-CM

## 2022-01-25 DIAGNOSIS — L03.112 CELLULITIS OF AXILLA, LEFT: ICD-10-CM

## 2022-01-25 PROCEDURE — 3066F PR DOCUMENTATION OF TREATMENT FOR NEPHROPATHY: ICD-10-PCS | Mod: HCNC,CPTII,S$GLB, | Performed by: NURSE PRACTITIONER

## 2022-01-25 PROCEDURE — 99999 PR PBB SHADOW E&M-EST. PATIENT-LVL IV: CPT | Mod: PBBFAC,HCNC,, | Performed by: NURSE PRACTITIONER

## 2022-01-25 PROCEDURE — 1126F PR PAIN SEVERITY QUANTIFIED, NO PAIN PRESENT: ICD-10-PCS | Mod: HCNC,CPTII,S$GLB, | Performed by: NURSE PRACTITIONER

## 2022-01-25 PROCEDURE — 3008F BODY MASS INDEX DOCD: CPT | Mod: HCNC,CPTII,S$GLB, | Performed by: NURSE PRACTITIONER

## 2022-01-25 PROCEDURE — 1126F AMNT PAIN NOTED NONE PRSNT: CPT | Mod: HCNC,CPTII,S$GLB, | Performed by: NURSE PRACTITIONER

## 2022-01-25 PROCEDURE — 1157F ADVNC CARE PLAN IN RCRD: CPT | Mod: HCNC,CPTII,S$GLB, | Performed by: NURSE PRACTITIONER

## 2022-01-25 PROCEDURE — 99213 PR OFFICE/OUTPT VISIT, EST, LEVL III, 20-29 MIN: ICD-10-PCS | Mod: HCNC,S$GLB,, | Performed by: NURSE PRACTITIONER

## 2022-01-25 PROCEDURE — 3288F PR FALLS RISK ASSESSMENT DOCUMENTED: ICD-10-PCS | Mod: HCNC,CPTII,S$GLB, | Performed by: NURSE PRACTITIONER

## 2022-01-25 PROCEDURE — 1101F PR PT FALLS ASSESS DOC 0-1 FALLS W/OUT INJ PAST YR: ICD-10-PCS | Mod: HCNC,CPTII,S$GLB, | Performed by: NURSE PRACTITIONER

## 2022-01-25 PROCEDURE — 3077F PR MOST RECENT SYSTOLIC BLOOD PRESSURE >= 140 MM HG: ICD-10-PCS | Mod: HCNC,CPTII,S$GLB, | Performed by: NURSE PRACTITIONER

## 2022-01-25 PROCEDURE — 3079F DIAST BP 80-89 MM HG: CPT | Mod: HCNC,CPTII,S$GLB, | Performed by: NURSE PRACTITIONER

## 2022-01-25 PROCEDURE — 1159F MED LIST DOCD IN RCRD: CPT | Mod: HCNC,CPTII,S$GLB, | Performed by: NURSE PRACTITIONER

## 2022-01-25 PROCEDURE — 3079F PR MOST RECENT DIASTOLIC BLOOD PRESSURE 80-89 MM HG: ICD-10-PCS | Mod: HCNC,CPTII,S$GLB, | Performed by: NURSE PRACTITIONER

## 2022-01-25 PROCEDURE — 3288F FALL RISK ASSESSMENT DOCD: CPT | Mod: HCNC,CPTII,S$GLB, | Performed by: NURSE PRACTITIONER

## 2022-01-25 PROCEDURE — 3008F PR BODY MASS INDEX (BMI) DOCUMENTED: ICD-10-PCS | Mod: HCNC,CPTII,S$GLB, | Performed by: NURSE PRACTITIONER

## 2022-01-25 PROCEDURE — 3066F NEPHROPATHY DOC TX: CPT | Mod: HCNC,CPTII,S$GLB, | Performed by: NURSE PRACTITIONER

## 2022-01-25 PROCEDURE — 1101F PT FALLS ASSESS-DOCD LE1/YR: CPT | Mod: HCNC,CPTII,S$GLB, | Performed by: NURSE PRACTITIONER

## 2022-01-25 PROCEDURE — 99999 PR PBB SHADOW E&M-EST. PATIENT-LVL IV: ICD-10-PCS | Mod: PBBFAC,HCNC,, | Performed by: NURSE PRACTITIONER

## 2022-01-25 PROCEDURE — 99213 OFFICE O/P EST LOW 20 MIN: CPT | Mod: HCNC,S$GLB,, | Performed by: NURSE PRACTITIONER

## 2022-01-25 PROCEDURE — 3077F SYST BP >= 140 MM HG: CPT | Mod: HCNC,CPTII,S$GLB, | Performed by: NURSE PRACTITIONER

## 2022-01-25 PROCEDURE — 1159F PR MEDICATION LIST DOCUMENTED IN MEDICAL RECORD: ICD-10-PCS | Mod: HCNC,CPTII,S$GLB, | Performed by: NURSE PRACTITIONER

## 2022-01-25 PROCEDURE — 1157F PR ADVANCE CARE PLAN OR EQUIV PRESENT IN MEDICAL RECORD: ICD-10-PCS | Mod: HCNC,CPTII,S$GLB, | Performed by: NURSE PRACTITIONER

## 2022-01-25 NOTE — PROGRESS NOTES
Nadia Damon  01/27/2022  [id number]    Luz Dickson NP  Patient Care Team:  Luz Dcikson NP as PCP - General (Family Medicine)  Miguel Soni Jr., MD as Consulting Physician (Vascular Surgery)  Ike King MD as Consulting Physician (Cardiology)  Courtney Tubbs MD as Consulting Physician (Cardiology)  Carter Crawford MD as Consulting Physician (Nephrology)  Paxton Vasques OD as Consulting Physician (Optometry)  PRANAY Villalobos MD as Obstetrician (Obstetrics)  Parker Mccarthy IV, MD (Inactive) (Urology)  Ike King MD as Consulting Physician (Cardiology)  Jose Roland MD as Consulting Physician (Dermatology)  Prasanth Johnson MD as Consulting Physician (Rheumatology)  Ayanna Hart, RN as Oncology Navigator  Nora Morin LMSW as   Ray Baker RN as Outpatient         [facility] Primary Care Note      Chief Complaint:  Chief Complaint   Patient presents with    2 week check        History of Present Illness:  HPI    Hgb 7.2. To start epogen 2/3/22.  To start xrt 2/10/22. Dr Tao    Stopped vancomycin herself for colitis, now having regular BMs.     Incision left upper chest wall healed. No further discomfort to axilla.     Review of Systems   Constitutional: Negative for chills, fever and malaise/fatigue.   Eyes: Negative for blurred vision.   Respiratory: Negative for cough and shortness of breath.    Cardiovascular: Negative for chest pain and leg swelling.   Gastrointestinal: Negative for abdominal pain, blood in stool, constipation, diarrhea, nausea and vomiting.   Genitourinary: Negative for dysuria.   Musculoskeletal: Negative for myalgias.   Neurological: Negative for dizziness and headaches.   Psychiatric/Behavioral: Negative for depression. The patient is not nervous/anxious and does not have insomnia.          The following were reviewed: Active problem list, medication list, allergies, family history, social history, and Health  Maintenance.     History:  Past Medical History:   Diagnosis Date    Abdominal wall hernia     CT Renal 6/11/2018---Small fat containing superior ventral abdominal wall hernia at the epicardial pacing lead site.    Anxiety     Arthritis     ZEN HIPS    Breast cancer in female 08/2021    LEFT BREAST    Cellulitis of axilla, left 12/23/2021    Chronic kidney disease     stage 4, GFR 15-29 ml/min    Chronic midline low back pain without sciatica 6/18/2018    Coronary artery disease 1993    heart transplant    Depression     Fibromyalgia     on Lyrica    Heart failure     native heart cardiomyopathy    Heart transplanted 1993    due to cardiomyopathy    History of hyperparathyroidism; Hyperparathyroidism, secondary renal     PT DENIES    Hypertension     Immune disorder     anti rejection meds    Iron deficiency anemia 8/15/2017    Kidney stones     passed per pt    Obesity     Other osteoporosis without current pathological fracture 8/30/2019    Shingles 2003 approx    left leg    Trouble in sleeping     Urinary incontinence      Past Surgical History:   Procedure Laterality Date    BLADDER SURGERY  2015 approx    mesh - Dr Everett then 2nd reconstructive sx Dr Onofre    BREAST BIOPSY Bilateral     NEGATIVE    BREAST SURGERY Left 9/28/15    Bx - benign    BREAST SURGERY Right 12/2015    Bx benign    CARDIAC PACEMAKER REMOVAL Left 06/26/2014    Pacer defirillator removed. Put in 1993 aat time of heart transplant    CARPAL TUNNEL RELEASE Left 03/03/15    Dr. Hall    COLONOSCOPY N/A 2/25/2021    Procedure: COLONOSCOPY;  Surgeon: Freida Ramirez MD;  Location: [facility] ENDO;  Service: Endoscopy;  Laterality: N/A;    HEART TRANSPLANT  1993    HERNIA REPAIR Right 1971 approx    Inguinal    HYSTERECTOMY  1983    vag hyst /LSO     INCISION AND DRAINAGE OF ABSCESS Left 12/24/2021    Procedure: INCISION AND DRAINAGE, ABSCESS;  Surgeon: Joseph Longo MD;  Location: [facility] OR;  Service: General;   Laterality: Left;    INSERTION OF TUNNELED CENTRAL VENOUS CATHETER (CVC) WITH SUBCUTANEOUS PORT N/A 2021    Procedure: [id number];  Surgeon: Christoph Douglas MD;  Location: [facility] OR;  Service: General;  Laterality: N/A;    SENTINEL LYMPH NODE BIOPSY Left 10/12/2021    Procedure: BIOPSY, LYMPH NODE, SENTINEL;  Surgeon: Christoph Douglas MD;  Location[facility] OR;  Service: General;  Laterality: Left;    TOE SURGERY       Family History   Problem Relation Age of Onset    Cancer Mother 38        breast    Breast cancer Mother     Heart disease Maternal Grandmother     Cataracts Cousin     Hypertension Son     Diabetes Neg Hx     Stroke Neg Hx     Kidney disease Neg Hx     Asthma Neg Hx     COPD Neg Hx     Melanoma Neg Hx     Hyperlipidemia Neg Hx      Social History     Socioeconomic History    Marital status: Single    Number of children: 2    Highest education level: 11th grade   Occupational History    Occupation: Retired   Tobacco Use    Smoking status: Never Smoker    Smokeless tobacco: Never Used   Substance and Sexual Activity    Alcohol use: Never     Alcohol/week: 0.0 standard drinks    Drug use: No    Sexual activity: Not Currently     Partners: Male     Birth control/protection: See Surgical Hx   Other Topics Concern    Are you pregnant or think you may be? No    Breast-feeding No   Social History Narrative    Single. 2 children , 1  at 30 yoa   strep throat -  pneumonia and renal complications after not completing course of AB. Other child lives in [place], [place]. Has a cousin locally that could help in an emergency. 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 Caffeine intake =- 1 cola a day. No coffee, + occasional tea, avoids caffeine especially at night. Still drives. She does not have a Living Will or Advanced directive.      Social Determinants of Health     Financial Resource Strain: Low Risk     Difficulty of Paying Living Expenses:  Not hard at all   Food Insecurity: No Food Insecurity    Worried About Running Out of Food in the Last Year: Never true    Ran Out of Food in the Last Year: Never true   Transportation Needs: No Transportation Needs    Lack of Transportation (Medical): No    Lack of Transportation (Non-Medical): No   Physical Activity: Insufficiently Active    Days of Exercise per Week: 2 days    Minutes of Exercise per Session: 10 min   Stress: No Stress Concern Present    Feeling of Stress : Not at all   Social Connections: Socially Isolated    Frequency of Communication with Friends and Family: Once a week    Frequency of Social Gatherings with Friends and Family: Once a week    Attends Ep[employer] Services: More than 4 times per year    Active Member of Clubs or Organizations: No    Attends Club or Organization Meetings: Never    Marital Status: Never    Housing Stability: Low Risk     Unable to Pay for Housing in the Last Year: No    Number of Places Lived in the Last Year: 1    Unstable Housing in the Last Year: No     Patient Active Problem List   Diagnosis    Heart transplanted    Anxiety    Insomnia    CKD (chronic kidney disease) stage 4, GFR 15-29 ml/min    Immunosuppression due to drug therapy    Fibromyalgia    Gastroesophageal reflux disease without esophagitis    Breast screening    Immunization deficiency    Essential hypertension    Aortic atherosclerosis    Chronic major depressive disorder, recurrent episode    Iron deficiency anemia    Vaginal atrophy    Hyperparathyroidism    Hx of falling    Chronic midline low back pain without sciatica    Closed nondisplaced fracture of distal phalanx of left great toe with routine healing    Lightheadedness    Medication side effects    Obesity (BMI 30.0-34.9)    Edema    Asymptomatic menopausal state     Other osteoporosis without current pathological fracture    Cough    Abnormal CT scan, kidney    Weakness of both lower  extremities    Immunocompromised patient    Osteoporosis, post-menopausal    Ductal carcinoma in situ (DCIS) of left breast    Immunodeficiency secondary to radiation therapy    Abnormal mammogram    The hangs, uncomplicated    Severe sepsis    GRACE (acute kidney injury)    Diarrhea    Drug-induced pancytopenia    Thrombocytopenia, unspecified    Immunodeficiency due to chemotherapy    C. difficile colitis    Strain of left shoulder    Left shoulder pain    Ulnar neuropathy of left upper extremity    Chronic diastolic heart failure    Anemia associated with stage 4 chronic renal failure    Secondary and unspecified malignant neoplasm of axilla and upper limb lymph nodes     Review of patient's allergies indicates:   Allergen Reactions    Lisinopril Swelling and Rash    Augmentin [amoxicillin-pot clavulanate] Diarrhea       Medications:  Current Outpatient Medications on File Prior to Visit   Medication Sig Dispense Refill    acetaminophen (TYLENOL) 325 MG tablet Take 1 tablet (325 mg total) by mouth every 6 (six) hours as needed for Pain.  0    aspirin (ECOTRIN) 81 MG EC tablet Take 1 tablet (81 mg total) by mouth once daily. 90 tablet 3    biotin 10,000 mcg Cap Take 1 tablet by mouth once daily.      busPIRone (BUSPAR) 10 MG tablet Take 1 tablet (10 mg total) by mouth once daily. 90 tablet 3    calcitonin, salmon, (FORTICAL) 200 unit/actuation nasal spray 1 spray by Nasal route once daily. 3.7 mL 3    carvediloL (COREG) 6.25 MG tablet Take 1 tablet (6.25 mg total) by mouth 2 (two) times daily. 180 tablet 3    cloNIDine (CATAPRES) 0.1 MG tablet Take 1 tablet (0.1 mg total) by mouth nightly as needed (BP >150/95). 90 tablet 3    cycloSPORINE modified, NEORAL, (NEORAL) 25 MG capsule Take 3 capsules (75 mg total) by mouth 2 (two) times daily. 270 capsule 11    DULoxetine (CYMBALTA) 30 MG capsule TAKE 1 CAPSULE EVERY DAY 90 capsule 1    EVENING PRIMROSE OIL ORAL Take 1,000 mg by mouth once  daily.      ferrous sulfate (FEOSOL) 325 mg (65 mg iron) Tab tablet Take 1 tablet (325 mg total) by mouth once daily. 100 tablet 3    furosemide (LASIX) 20 MG tablet Take 1 tablet (20 mg total) by mouth 2 (two) times a day for 5 days, THEN 1 tablet (20 mg total) once daily. Take 1 tablet daily. 370 tablet 0    hydrALAZINE (APRESOLINE) 50 MG tablet TAKE 2 TABLETS  EVERY 8  HOURS. 540 tablet 3    LIDOCAINE VISCOUS 2 % solution SWISH AND SPIT 10 MLS EVERY 4 (FOUR) HOURS AS NEEDED (MOUTH&/OR THROAT PAIN). FOR MOUTH SORES 450 each 11    LIDOcaine-prilocaine (EMLA) cream Apply topically as needed. 30 g 2    losartan (COZAAR) 25 MG tablet Take 1 tablet (25 mg total) by mouth once daily. 90 tablet 3    multivitamin capsule Take 1 capsule by mouth once daily.      ondansetron (ZOFRAN-ODT) 8 MG TbDL Take 1 tablet (8 mg total) by mouth every 8 (eight) hours as needed (nausea). 60 tablet 5    pantoprazole (PROTONIX) 40 MG tablet Take 1 tablet (40 mg total) by mouth once daily. 90 tablet 1    pregabalin (LYRICA) 50 MG capsule Take 1 capsule (50 mg total) by mouth 2 (two) times daily. NOON & NIGHT TIME 180 capsule 1    temazepam (RESTORIL) 30 mg capsule Take 1 at bedtime 90 capsule 1    vancomycin (VANCOCIN) 125 MG capsule Take 1 capsule (125 mg total) by mouth 4 (four) times daily for 7 days, THEN 1 capsule (125 mg total) 2 (two) times a day for 7 days, THEN 1 capsule (125 mg total) once daily for 7 days, THEN 1 capsule (125 mg total) every other day for 14 days, THEN 1 capsule (125 mg total) Every 3 (three) days for 14 days. 60 capsule 0    vitamin E 400 UNIT capsule Take 400 Units by mouth once daily.      zolpidem (AMBIEN) 10 mg Tab TAKE 1 TABLET BY MOUTH ONCE DAILY IN THE EVENING 90 tablet 1     Current Facility-Administered Medications on File Prior to Visit   Medication Dose Route Frequency Provider Last Rate Last Admin    denosumab (PROLIA) injection 60 mg  60 mg Subcutaneous Q6 Months Prasanth Johnson MD         lactated ringers infusion   Intravenous Continuous Jennifer Carr MD 10 mL/hr at 11/09/21 0717 Restarted at 11/09/21 0820       Medications have been reviewed and reconciled with patient at visit today.    Barriers to medications present (yes )    Adverse reactions to current medications (no)      Exam:  Vitals:    01/25/22 1156   BP: (!) 140/84   Pulse: 107   Temp: 98.4 °F (36.9 °C)     Weight: 74.3 kg (163 lb 12.8 oz)   Body mass index is 29.02 kg/m².      BP Readings from Last 3 Encounters:   01/25/22 (!) 140/84   01/24/22 137/89   01/20/22 (!) 139/90     Wt Readings from Last 3 Encounters:   01/25/22 1156 74.3 kg (163 lb 12.8 oz)   01/24/22 1113 74.3 kg (163 lb 12.8 oz)   01/20/22 1420 76.8 kg (169 lb 5 oz)            Physical Exam  Constitutional:       Appearance: She is ill-appearing (chronically).   HENT:      Mouth/Throat:      Mouth: Mucous membranes are moist.   Cardiovascular:      Rate and Rhythm: Normal rate and regular rhythm.   Pulmonary:      Effort: Pulmonary effort is normal.   Chest:       Skin:     General: Skin is warm and dry.   Neurological:      Mental Status: She is alert. Mental status is at baseline.   Psychiatric:         Mood and Affect: Mood normal.         Behavior: Behavior normal.         Thought Content: Thought content normal.         Laboratory Reviewed: (Yes)  Lab Results   Component Value Date    WBC 4.40 01/20/2022    HGB 7.2 (L) 01/20/2022    HCT 22.5 (L) 01/20/2022     01/20/2022    CHOL 157 12/24/2021    TRIG 120 12/24/2021    HDL 42 12/24/2021    ALT 28 01/20/2022    AST 39 01/20/2022     01/20/2022    K 4.7 01/20/2022     01/20/2022    CREATININE 2.6 (H) 01/20/2022    BUN 32 (H) 01/20/2022    CO2 24 01/20/2022    TSH 5.158 (H) 12/24/2021    PSA <0.1 05/27/2008    INR 1.0 11/22/2021    HGBA1C 5.2 12/24/2021           Health Maintenance  Health Maintenance Topics with due status: Not Due       Topic Last Completion Date    Pneumococcal Vaccines (Age  65+) 10/22/2018    TETANUS VACCINE 09/09/2020    Colorectal Cancer Screening 02/25/2021    Mammogram 07/19/2021    DEXA SCAN 08/03/2021    Lipid Panel 12/24/2021     There are no preventive care reminders to display for this patient.          Assessment:  Problem List Items Addressed This Visit        ID    RESOLVED: Cellulitis of axilla, left     No further sx            Oncology    Ductal carcinoma in situ (DCIS) of left breast     Continue f/u with oncology.  To start XRT              GI    Diarrhea     Pt self-discontinued PO vanc.   Regular BMs  Monitor.                  Plan:  Diarrhea, unspecified type    Ductal carcinoma in situ (DCIS) of left breast    Cellulitis of axilla, left      -Patient's lab results were reviewed and discussed with patient  -Treatment options and alternatives were discussed with the patient. Patient expressed understanding. Patient was given the opportunity to ask questions and be an active participant in their medical care. Patient had no further questions or concerns at this time.   -Documentation of patient's health and condition was obtained from family member who was present during visit.  -Patient is an overall moderate risk for health complications from their medical conditions.       Follow up: Follow up in about 2 months (around 3/25/2022).      After visit summary printed and given to patient upon discharge.  Patient goals and care plan are included in After visit summary.    Total medical decision making time was 24 min.  The following issues were discussed: Diagnoses of Diarrhea, unspecified type, Ductal carcinoma in situ (DCIS) of left breast, and Cellulitis of axilla, left were pertinent to this visit.    Health maintenance needs, recent test results and goals of care discussed with pt and questions answered.

## 2022-01-25 NOTE — PROGRESS NOTES
Outpatient Care Management  Plan of Care Follow Up Visit    Patient: Nadia Damon  MRN: [id number]  Date of Service: 01/25/2022  Completed by: Ray Baker RN  Referral Date: 12/03/2021  Program: Case Management (High Risk)    Reason for Visit   Patient presents with    Update Plan Of Care       Brief Summary: Phone contact made with Ms. Damon today. She was getting ready for a doctors appointment so we were limited on time to discuss care plan tasks. Plan to follow up in 1-2 weeks to continue education and management.     Patient Summary     Involvement of Care:  Do I have permission to speak with other family members about your care?   no    Patient Reported Labs & Vitals:  1.  Any Patient Reported Labs & Vitals?   no  2.  Patient Reported Blood Pressure:     3.  Patient Reported Pulse:     4.  Patient Reported Weight (Kg):     5.  Patient Reported Blood Glucose (mg/dl):       Medical and social history was reviewed with patient and/or caregiver.     Clinical Assessment     Reviewed and provided basic information on available community resources for mental health, transportation, wellness resources, and palliative care programs with patient and/or caregiver.     Complex Care Plan     Care plan was discussed and completed today with input from patient and/or caregiver.    Patient Instructions     Instructions were provided via the [facility] patient resources and are available for the patient to view on the patient portal.    Next Steps: Follow up in 1-2 weeks to continue education and management Ray Baker RN         Todays OPCM Self-Management Care Plan was developed with the patients/caregivers input and was based on identified barriers from todays assessment.  Goals were written today with the patient/caregiver and the patient has agreed to work towards these goals to improve his/her overall well-being. Patient verbalized understanding of the care plan, goals, and all of today's instructions.  Encouraged patient/caregiver to communicate with his/her physician and health care team about health conditions and the treatment plan.  Provided my contact information today and encouraged patient/caregiver to call me with any questions as needed.

## 2022-01-27 PROBLEM — L03.112 CELLULITIS OF AXILLA, LEFT: Status: RESOLVED | Noted: 2021-12-23 | Resolved: 2022-01-27

## 2022-01-28 ENCOUNTER — DOCUMENTATION ONLY (OUTPATIENT)
Dept: HEMATOLOGY/ONCOLOGY | Facility: CLINIC | Age: 67
End: 2022-01-28
Payer: MEDICARE

## 2022-01-28 NOTE — NURSING
Called to check on pt, states she is doing well. Denies navigation needs at this time but has my number should she need to call.   Oncology Navigation   Intake  Date of Diagnosis: 2021  Cancer Type: Breast  Internal / External Referral: Internal  Referral Source: In alisia hayward from [facility]   Date of Referral: 2021  Initial Nurse Navigator Contact: 2021  Referral to Initial Contact Timeline (days): 1  Date Worked: 2022  Start of Treatment: 2021     Treatment  Current Status: -- (MD discontinued tx)  Date Presented to Tumor Board: 2021    Surgery: Completed  Surgical Oncologist: Dr. Douglas  Surgery Schedule Date: 10/12/2021    Medical Oncologist: Dr. Collins  Consult Date: 2021  Chemotherapy: Discontinued ( due to side effects)  Chemotherapy Regimen: Taxotere/Cytoxan udenyca    Radiation Oncologist: Dr. Burger    Procedures: Port / PICC  Port / PICC Schedule Date: 2021    General Referrals: Social work; [facility]   Social Work Referral Date: 11/10/2021    ER: Negative  AZ: Negative  Her2: Negative    Radiation Oncologist: Dr. Burger    Support Systems: Children; Family members     Acuity  Systemic Treatment - predicted or initiated: More than one treatment modality concurrently (chemotherapy, radiation, etc.) (+2)  Treatment Tolerability: Has not started treatment yet/treatment fully completed and side effects resolved  ECO-3 (+1)  Comorbidities in Medical History: 6+ (+2)  Support: Patient reports adequate support system  Transportation: Adequate transportation for treatment  Verbalizes the need for more education: Yes  Navigation Acuity: 6     Follow Up  Follow up in 6 days (on 2/3/2022) for procrit.

## 2022-01-31 PROBLEM — I50.32 CHRONIC DIASTOLIC HEART FAILURE: Status: RESOLVED | Noted: 2021-12-16 | Resolved: 2022-01-31

## 2022-01-31 NOTE — ASSESSMENT & PLAN NOTE
Now with cellulitis sx left axilla, some slough to site.   Message to Dr Douglas.  Plans to see surgeon tomorrow.   Continue abx.

## 2022-02-03 ENCOUNTER — DOCUMENT SCAN (OUTPATIENT)
Dept: HOME HEALTH SERVICES | Facility: HOSPITAL | Age: 67
End: 2022-02-03
Payer: MEDICARE

## 2022-02-07 ENCOUNTER — TELEPHONE (OUTPATIENT)
Dept: PRIMARY CARE CLINIC | Facility: CLINIC | Age: 67
End: 2022-02-07
Payer: MEDICARE

## 2022-02-07 NOTE — TELEPHONE ENCOUNTER
----- Message from Ning Bowen sent at 2/7/2022  8:52 AM CST -----  Contact: Nadia Zuniga called regarding soreness under arm and would like to speak with someone, please give her a call back at [phone] (home)       Thanks  kb

## 2022-02-07 NOTE — TELEPHONE ENCOUNTER
Patient call returned. She stated that she wanted to schedule to be seen on tomorrow for her left arm soreness (around breast area). Patient was scheduled to be seen on tomorrow for 11:30 am & verbally understood the appointment information.

## 2022-02-08 ENCOUNTER — OFFICE VISIT (OUTPATIENT)
Dept: PRIMARY CARE CLINIC | Facility: CLINIC | Age: 67
End: 2022-02-08
Payer: MEDICARE

## 2022-02-08 ENCOUNTER — LAB VISIT (OUTPATIENT)
Dept: LAB | Facility: HOSPITAL | Age: 67
End: 2022-02-08
Attending: INTERNAL MEDICINE
Payer: MEDICARE

## 2022-02-08 ENCOUNTER — OUTPATIENT CASE MANAGEMENT (OUTPATIENT)
Dept: ADMINISTRATIVE | Facility: OTHER | Age: 67
End: 2022-02-08
Payer: MEDICARE

## 2022-02-08 VITALS
BODY MASS INDEX: 28.98 KG/M2 | OXYGEN SATURATION: 97 % | SYSTOLIC BLOOD PRESSURE: 140 MMHG | HEIGHT: 63 IN | DIASTOLIC BLOOD PRESSURE: 84 MMHG | WEIGHT: 163.56 LBS | TEMPERATURE: 98 F | HEART RATE: 102 BPM

## 2022-02-08 DIAGNOSIS — N64.4 BREAST PAIN, LEFT: ICD-10-CM

## 2022-02-08 DIAGNOSIS — D63.1 ANEMIA ASSOCIATED WITH STAGE 4 CHRONIC RENAL FAILURE: ICD-10-CM

## 2022-02-08 DIAGNOSIS — D63.1 ANEMIA DUE TO STAGE 4 CHRONIC KIDNEY DISEASE: ICD-10-CM

## 2022-02-08 DIAGNOSIS — C50.912 MALIGNANT NEOPLASM OF LEFT FEMALE BREAST, UNSPECIFIED ESTROGEN RECEPTOR STATUS, UNSPECIFIED SITE OF BREAST: Primary | ICD-10-CM

## 2022-02-08 DIAGNOSIS — D05.12 DUCTAL CARCINOMA IN SITU (DCIS) OF LEFT BREAST: Primary | ICD-10-CM

## 2022-02-08 DIAGNOSIS — N18.4 ANEMIA ASSOCIATED WITH STAGE 4 CHRONIC RENAL FAILURE: ICD-10-CM

## 2022-02-08 DIAGNOSIS — N18.4 ANEMIA DUE TO STAGE 4 CHRONIC KIDNEY DISEASE: ICD-10-CM

## 2022-02-08 DIAGNOSIS — N64.4 BREAST PAIN, LEFT: Primary | ICD-10-CM

## 2022-02-08 LAB
ANION GAP SERPL CALC-SCNC: 12 MMOL/L (ref 8–16)
BASOPHILS # BLD AUTO: 0.02 K/UL (ref 0–0.2)
BASOPHILS NFR BLD: 0.4 % (ref 0–1.9)
BUN SERPL-MCNC: 30 MG/DL (ref 8–23)
CALCIUM SERPL-MCNC: 8.8 MG/DL (ref 8.7–10.5)
CHLORIDE SERPL-SCNC: 104 MMOL/L (ref 95–110)
CO2 SERPL-SCNC: 24 MMOL/L (ref 23–29)
CREAT SERPL-MCNC: 2.5 MG/DL (ref 0.5–1.4)
DIFFERENTIAL METHOD: ABNORMAL
EOSINOPHIL # BLD AUTO: 0.1 K/UL (ref 0–0.5)
EOSINOPHIL NFR BLD: 2 % (ref 0–8)
ERYTHROCYTE [DISTWIDTH] IN BLOOD BY AUTOMATED COUNT: 16.3 % (ref 11.5–14.5)
EST. GFR  (AFRICAN AMERICAN): 22 ML/MIN/1.73 M^2
EST. GFR  (NON AFRICAN AMERICAN): 19 ML/MIN/1.73 M^2
FERRITIN SERPL-MCNC: 473 NG/ML (ref 20–300)
GLUCOSE SERPL-MCNC: 133 MG/DL (ref 70–110)
HCT VFR BLD AUTO: 26 % (ref 37–48.5)
HGB BLD-MCNC: 8.1 G/DL (ref 12–16)
IMM GRANULOCYTES # BLD AUTO: 0.03 K/UL (ref 0–0.04)
IMM GRANULOCYTES NFR BLD AUTO: 0.7 % (ref 0–0.5)
IRON SERPL-MCNC: 36 UG/DL (ref 30–160)
LYMPHOCYTES # BLD AUTO: 0.8 K/UL (ref 1–4.8)
LYMPHOCYTES NFR BLD: 17.9 % (ref 18–48)
MCH RBC QN AUTO: 28.5 PG (ref 27–31)
MCHC RBC AUTO-ENTMCNC: 31.2 G/DL (ref 32–36)
MCV RBC AUTO: 92 FL (ref 82–98)
MONOCYTES # BLD AUTO: 0.6 K/UL (ref 0.3–1)
MONOCYTES NFR BLD: 13 % (ref 4–15)
NEUTROPHILS # BLD AUTO: 2.9 K/UL (ref 1.8–7.7)
NEUTROPHILS NFR BLD: 66 % (ref 38–73)
NRBC BLD-RTO: 0 /100 WBC
PLATELET # BLD AUTO: 263 K/UL (ref 150–450)
PMV BLD AUTO: 9.3 FL (ref 9.2–12.9)
POTASSIUM SERPL-SCNC: 4.7 MMOL/L (ref 3.5–5.1)
RBC # BLD AUTO: 2.84 M/UL (ref 4–5.4)
SATURATED IRON: 15 % (ref 20–50)
SODIUM SERPL-SCNC: 140 MMOL/L (ref 136–145)
TOTAL IRON BINDING CAPACITY: 234 UG/DL (ref 250–450)
TRANSFERRIN SERPL-MCNC: 158 MG/DL (ref 200–375)
WBC # BLD AUTO: 4.46 K/UL (ref 3.9–12.7)

## 2022-02-08 PROCEDURE — 1126F AMNT PAIN NOTED NONE PRSNT: CPT | Mod: HCNC,CPTII,S$GLB, | Performed by: NURSE PRACTITIONER

## 2022-02-08 PROCEDURE — 1101F PT FALLS ASSESS-DOCD LE1/YR: CPT | Mod: HCNC,CPTII,S$GLB, | Performed by: NURSE PRACTITIONER

## 2022-02-08 PROCEDURE — 3079F DIAST BP 80-89 MM HG: CPT | Mod: HCNC,CPTII,S$GLB, | Performed by: NURSE PRACTITIONER

## 2022-02-08 PROCEDURE — 99214 PR OFFICE/OUTPT VISIT, EST, LEVL IV, 30-39 MIN: ICD-10-PCS | Mod: HCNC,S$GLB,, | Performed by: NURSE PRACTITIONER

## 2022-02-08 PROCEDURE — 3066F PR DOCUMENTATION OF TREATMENT FOR NEPHROPATHY: ICD-10-PCS | Mod: HCNC,CPTII,S$GLB, | Performed by: NURSE PRACTITIONER

## 2022-02-08 PROCEDURE — 99999 PR PBB SHADOW E&M-EST. PATIENT-LVL V: ICD-10-PCS | Mod: PBBFAC,HCNC,, | Performed by: NURSE PRACTITIONER

## 2022-02-08 PROCEDURE — 1157F ADVNC CARE PLAN IN RCRD: CPT | Mod: HCNC,CPTII,S$GLB, | Performed by: NURSE PRACTITIONER

## 2022-02-08 PROCEDURE — 1157F PR ADVANCE CARE PLAN OR EQUIV PRESENT IN MEDICAL RECORD: ICD-10-PCS | Mod: HCNC,CPTII,S$GLB, | Performed by: NURSE PRACTITIONER

## 2022-02-08 PROCEDURE — 3066F NEPHROPATHY DOC TX: CPT | Mod: HCNC,CPTII,S$GLB, | Performed by: NURSE PRACTITIONER

## 2022-02-08 PROCEDURE — 3077F SYST BP >= 140 MM HG: CPT | Mod: HCNC,CPTII,S$GLB, | Performed by: NURSE PRACTITIONER

## 2022-02-08 PROCEDURE — 1101F PR PT FALLS ASSESS DOC 0-1 FALLS W/OUT INJ PAST YR: ICD-10-PCS | Mod: HCNC,CPTII,S$GLB, | Performed by: NURSE PRACTITIONER

## 2022-02-08 PROCEDURE — 1160F PR REVIEW ALL MEDS BY PRESCRIBER/CLIN PHARMACIST DOCUMENTED: ICD-10-PCS | Mod: HCNC,CPTII,S$GLB, | Performed by: NURSE PRACTITIONER

## 2022-02-08 PROCEDURE — 36415 COLL VENOUS BLD VENIPUNCTURE: CPT | Mod: HCNC | Performed by: INTERNAL MEDICINE

## 2022-02-08 PROCEDURE — 1126F PR PAIN SEVERITY QUANTIFIED, NO PAIN PRESENT: ICD-10-PCS | Mod: HCNC,CPTII,S$GLB, | Performed by: NURSE PRACTITIONER

## 2022-02-08 PROCEDURE — 3008F BODY MASS INDEX DOCD: CPT | Mod: HCNC,CPTII,S$GLB, | Performed by: NURSE PRACTITIONER

## 2022-02-08 PROCEDURE — 3079F PR MOST RECENT DIASTOLIC BLOOD PRESSURE 80-89 MM HG: ICD-10-PCS | Mod: HCNC,CPTII,S$GLB, | Performed by: NURSE PRACTITIONER

## 2022-02-08 PROCEDURE — 99214 OFFICE O/P EST MOD 30 MIN: CPT | Mod: HCNC,S$GLB,, | Performed by: NURSE PRACTITIONER

## 2022-02-08 PROCEDURE — 3008F PR BODY MASS INDEX (BMI) DOCUMENTED: ICD-10-PCS | Mod: HCNC,CPTII,S$GLB, | Performed by: NURSE PRACTITIONER

## 2022-02-08 PROCEDURE — 99999 PR PBB SHADOW E&M-EST. PATIENT-LVL V: CPT | Mod: PBBFAC,HCNC,, | Performed by: NURSE PRACTITIONER

## 2022-02-08 PROCEDURE — 1159F MED LIST DOCD IN RCRD: CPT | Mod: HCNC,CPTII,S$GLB, | Performed by: NURSE PRACTITIONER

## 2022-02-08 PROCEDURE — 84466 ASSAY OF TRANSFERRIN: CPT | Mod: HCNC | Performed by: INTERNAL MEDICINE

## 2022-02-08 PROCEDURE — 3288F PR FALLS RISK ASSESSMENT DOCUMENTED: ICD-10-PCS | Mod: HCNC,CPTII,S$GLB, | Performed by: NURSE PRACTITIONER

## 2022-02-08 PROCEDURE — 3288F FALL RISK ASSESSMENT DOCD: CPT | Mod: HCNC,CPTII,S$GLB, | Performed by: NURSE PRACTITIONER

## 2022-02-08 PROCEDURE — 82728 ASSAY OF FERRITIN: CPT | Mod: HCNC | Performed by: INTERNAL MEDICINE

## 2022-02-08 PROCEDURE — 1159F PR MEDICATION LIST DOCUMENTED IN MEDICAL RECORD: ICD-10-PCS | Mod: HCNC,CPTII,S$GLB, | Performed by: NURSE PRACTITIONER

## 2022-02-08 PROCEDURE — 80048 BASIC METABOLIC PNL TOTAL CA: CPT | Mod: HCNC | Performed by: INTERNAL MEDICINE

## 2022-02-08 PROCEDURE — 3077F PR MOST RECENT SYSTOLIC BLOOD PRESSURE >= 140 MM HG: ICD-10-PCS | Mod: HCNC,CPTII,S$GLB, | Performed by: NURSE PRACTITIONER

## 2022-02-08 PROCEDURE — 85025 COMPLETE CBC W/AUTO DIFF WBC: CPT | Mod: HCNC | Performed by: INTERNAL MEDICINE

## 2022-02-08 PROCEDURE — 1160F RVW MEDS BY RX/DR IN RCRD: CPT | Mod: HCNC,CPTII,S$GLB, | Performed by: NURSE PRACTITIONER

## 2022-02-08 NOTE — Clinical Note
Good afternoon,  I saw Ms Damon in clinic today for new left breast pain with a 4 cm mass to left breast at 2 o'clock. She has breast u/s and diagnostic mammogram scheduled tomorrow at 9 am and she is scheduled to start radiation on 2/10. Do you have any additional recommendations?  Thank you

## 2022-02-08 NOTE — PROGRESS NOTES
Nadia Damon  02/08/2022  [id number]    Luz Dickson NP  Patient Care Team:  Luz Dickson NP as PCP - General (Family Medicine)  Miguel Soni Jr., MD as Consulting Physician (Vascular Surgery)  Ike King MD as Consulting Physician (Cardiology)  Courtney Tubbs MD as Consulting Physician (Cardiology)  Carter Crawford MD as Consulting Physician (Nephrology)  Paxton Vasques OD as Consulting Physician (Optometry)  PRANAY Villalobos MD as Obstetrician (Obstetrics)  Parker Mccarthy IV, MD (Inactive) (Urology)  Ike King MD as Consulting Physician (Cardiology)  Jose Roland MD as Consulting Physician (Dermatology)  Prasanth Johnson MD as Consulting Physician (Rheumatology)  Ayanna Hart, RN as Oncology Navigator  Nora Morin LMSW as   Ray Baker, RN as Outpatient     Has the patient seen any provider outside of the network since the last visit ? (no). If yes, HIPPA forms completed and records requested.      Visit Type:an urgent visit for a new problem    Chief Complaint:    Lower breast area soreness Left arm    History of Present Illness:  HPI     Hgb 7.2. To start erythropoietin 97268 units Q 2 weeks x3 2/9/22  To start xrt 2/10/22. Dr Burger    Colitis diarrhea resolved normal BM LBM 02/07    Having soreness to left breast with burning at times onset last Wednesday.  Also reports chills last week.   Vomiting episode x 1 yesterday feels it was due to heartburn        Review of Systems   Constitutional: Positive for chills. Negative for diaphoresis, fever, malaise/fatigue and weight loss.   HENT: Negative for congestion, hearing loss, sinus pain, sore throat and tinnitus.    Eyes: Negative for blurred vision and double vision.   Respiratory: Negative for cough, hemoptysis and shortness of breath.    Cardiovascular: Negative for chest pain, palpitations and leg swelling.   Gastrointestinal: Positive for heartburn and vomiting. Negative for blood in  stool, constipation and diarrhea.   Genitourinary: Negative for dysuria, frequency and urgency.   Musculoskeletal: Negative for falls, joint pain and myalgias.   Skin: Negative for itching and rash.   Neurological: Negative for dizziness, seizures and headaches.   Psychiatric/Behavioral: Negative for depression. The patient does not have insomnia.          The following were reviewed: Active problem list, medication list, allergies, family history, social history, and Health Maintenance.     History:  Past Medical History:   Diagnosis Date    Abdominal wall hernia     CT Renal 6/11/2018---Small fat containing superior ventral abdominal wall hernia at the epicardial pacing lead site.    Anxiety     Arthritis     ZEN HIPS    Breast cancer in female 08/2021    LEFT BREAST    Cellulitis of axilla, left 12/23/2021    Chronic diastolic heart failure 12/16/2021    Chronic kidney disease     stage 4, GFR 15-29 ml/min    Chronic midline low back pain without sciatica 6/18/2018    Coronary artery disease 1993    heart transplant    Depression     Fibromyalgia     on Lyrica    Heart failure     native heart cardiomyopathy    Heart transplanted 1993    due to cardiomyopathy    History of hyperparathyroidism; Hyperparathyroidism, secondary renal     PT DENIES    Hypertension     Immune disorder     anti rejection meds    Iron deficiency anemia 8/15/2017    Kidney stones     passed per pt    Obesity     Other osteoporosis without current pathological fracture 8/30/2019    Shingles 2003 approx    left leg    Trouble in sleeping     Urinary incontinence      Past Surgical History:   Procedure Laterality Date    BLADDER SURGERY  2015 approx    mesh - Dr Everett then 2nd reconstructive sx Dr Onofre    BREAST BIOPSY Bilateral     NEGATIVE    BREAST SURGERY Left 9/28/15    Bx - benign    BREAST SURGERY Right 12/2015    Bx benign    CARDIAC PACEMAKER REMOVAL Left 06/26/2014    Pacer defirillator removed.  Put in 1993 aat time of heart transplant    CARPAL TUNNEL RELEASE Left 03/03/15    Dr. Hall    COLONOSCOPY N/A 2/25/2021    Procedure: COLONOSCOPY;  Surgeon: Freida Ramirez MD;  Location: [facility] ENDO;  Service: Endoscopy;  Laterality: N/A;    HEART TRANSPLANT  1993    HERNIA REPAIR Right 1971 approx    Inguinal    HYSTERECTOMY  1983    vag hyst /LSO     INCISION AND DRAINAGE OF ABSCESS Left 12/24/2021    Procedure: INCISION AND DRAINAGE, ABSCESS;  Surgeon: Joseph Longo MD;  Location: [facility] OR;  Service: General;  Laterality: Left;    INSERTION OF TUNNELED CENTRAL VENOUS CATHETER (CVC) WITH SUBCUTANEOUS PORT N/A 11/9/2021    Procedure: [id number];  Surgeon: Christoph Douglas MD;  Location: [facility] OR;  Service: General;  Laterality: N/A;    REMOVAL OF VASCULAR ACCESS PORT      SENTINEL LYMPH NODE BIOPSY Left 10/12/2021    Procedure: BIOPSY, LYMPH NODE, SENTINEL;  Surgeon: Christoph Douglas MD;  Location: [facility] OR;  Service: General;  Laterality: Left;    TOE SURGERY       Family History   Problem Relation Age of Onset    Cancer Mother 38        breast    Breast cancer Mother     Heart disease Maternal Grandmother     Cataracts Cousin     Hypertension Son     Diabetes Neg Hx     Stroke Neg Hx     Kidney disease Neg Hx     Asthma Neg Hx     COPD Neg Hx     Melanoma Neg Hx     Hyperlipidemia Neg Hx      Social History     Socioeconomic History    Marital status: Single    Number of children: 2    Highest education level: 11th grade   Occupational History    Occupation: Retired   Tobacco Use    Smoking status: Never Smoker    Smokeless tobacco: Never Used   Substance and Sexual Activity    Alcohol use: Never     Alcohol/week: 0.0 standard drinks    Drug use: No    Sexual activity: Not Currently     Partners: Male     Birth control/protection: See Surgical Hx   Other Topics Concern    Are you pregnant or think you may be? No    Breast-feeding No   Social History Narrative    Single. 2  children , 1  at  yo2014 strep throat -  pneumonia and renal complications after not completing course of AB. Other child lives in [place], [place]. Has a cousin locally that could help in an emergency. 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 Caffeine intake =- 1 cola a day. No coffee, + occasional tea, avoids caffeine especially at night. Still drives. She does not have a Living Will or Advanced directive.      Social Determinants of Health     Financial Resource Strain: Low Risk     Difficulty of Paying Living Expenses: Not hard at all   Food Insecurity: No Food Insecurity    Worried About Running Out of Food in the Last Year: Never true    Ran Out of Food in the Last Year: Never true   Transportation Needs: No Transportation Needs    Lack of Transportation (Medical): No    Lack of Transportation (Non-Medical): No   Physical Activity: Insufficiently Active    Days of Exercise per Week: 2 days    Minutes of Exercise per Session: 10 min   Stress: No Stress Concern Present    Feeling of Stress : Not at all   Social Connections: Socially Isolated    Frequency of Communication with Friends and Family: Once a week    Frequency of Social Gatherings with Friends and Family: Once a week    Attends Confucianist Services: More than 4 times per year    Active Member of Clubs or Organizations: No    Attends Club or Organization Meetings: Never    Marital Status: Never    Housing Stability: Low Risk     Unable to Pay for Housing in the Last Year: No    Number of Places Lived in the Last Year: 1    Unstable Housing in the Last Year: No     Patient Active Problem List   Diagnosis    Heart transplanted    Anxiety    Insomnia    CKD (chronic kidney disease) stage 4, GFR 15-29 ml/min    Immunosuppression due to drug therapy    Fibromyalgia    Gastroesophageal reflux disease without esophagitis    Breast screening    Immunization deficiency    Essential  hypertension    Aortic atherosclerosis    Chronic major depressive disorder, recurrent episode    Iron deficiency anemia    Vaginal atrophy    Hyperparathyroidism    Hx of falling    Chronic midline low back pain without sciatica    Closed nondisplaced fracture of distal phalanx of left great toe with routine healing    Lightheadedness    Medication side effects    Obesity (BMI 30.0-34.9)    Edema    Asymptomatic menopausal state     Other osteoporosis without current pathological fracture    Cough    Abnormal CT scan, kidney    Weakness of both lower extremities    Immunocompromised patient    Osteoporosis, post-menopausal    Ductal carcinoma in situ (DCIS) of left breast    Immunodeficiency secondary to radiation therapy    Abnormal mammogram    The hangs, uncomplicated    Severe sepsis    GRACE (acute kidney injury)    Diarrhea    Drug-induced pancytopenia    Thrombocytopenia, unspecified    Immunodeficiency due to chemotherapy    C. difficile colitis    Strain of left shoulder    Left shoulder pain    Ulnar neuropathy of left upper extremity    Anemia associated with stage 4 chronic renal failure    Secondary and unspecified malignant neoplasm of axilla and upper limb lymph nodes     Review of patient's allergies indicates:   Allergen Reactions    Lisinopril Swelling and Rash    Augmentin [amoxicillin-pot clavulanate] Diarrhea       Health Maintenance  Health Maintenance Topics with due status: Not Due       Topic Last Completion Date    Pneumococcal Vaccines (Age 65+) 10/22/2018    TETANUS VACCINE 09/09/2020    Colorectal Cancer Screening 02/25/2021    Mammogram 07/19/2021    DEXA SCAN 08/03/2021    Lipid Panel 12/24/2021     There are no preventive care reminders to display for this patient.    Medications:  Current Outpatient Medications on File Prior to Visit   Medication Sig Dispense Refill    acetaminophen (TYLENOL) 325 MG tablet Take 1 tablet (325 mg total) by mouth  every 6 (six) hours as needed for Pain.  0    aspirin (ECOTRIN) 81 MG EC tablet Take 1 tablet (81 mg total) by mouth once daily. 90 tablet 3    biotin 10,000 mcg Cap Take 1 tablet by mouth once daily.      busPIRone (BUSPAR) 10 MG tablet Take 1 tablet (10 mg total) by mouth once daily. 90 tablet 3    calcitonin, salmon, (FORTICAL) 200 unit/actuation nasal spray 1 spray by Nasal route once daily. 3.7 mL 3    carvediloL (COREG) 6.25 MG tablet Take 1 tablet (6.25 mg total) by mouth 2 (two) times daily. 180 tablet 3    cloNIDine (CATAPRES) 0.1 MG tablet Take 1 tablet (0.1 mg total) by mouth nightly as needed (BP >150/95). 90 tablet 3    cycloSPORINE modified, NEORAL, (NEORAL) 25 MG capsule Take 3 capsules (75 mg total) by mouth 2 (two) times daily. 270 capsule 11    DULoxetine (CYMBALTA) 30 MG capsule TAKE 1 CAPSULE EVERY DAY 90 capsule 1    EVENING PRIMROSE OIL ORAL Take 1,000 mg by mouth once daily.      furosemide (LASIX) 20 MG tablet Take 1 tablet (20 mg total) by mouth 2 (two) times a day for 5 days, THEN 1 tablet (20 mg total) once daily. Take 1 tablet daily. 370 tablet 0    hydrALAZINE (APRESOLINE) 50 MG tablet TAKE 2 TABLETS  EVERY 8  HOURS. 540 tablet 3    losartan (COZAAR) 25 MG tablet Take 1 tablet (25 mg total) by mouth once daily. 90 tablet 3    multivitamin capsule Take 1 capsule by mouth once daily.      ondansetron (ZOFRAN-ODT) 8 MG TbDL Take 1 tablet (8 mg total) by mouth every 8 (eight) hours as needed (nausea). 60 tablet 5    pantoprazole (PROTONIX) 40 MG tablet Take 1 tablet (40 mg total) by mouth once daily. 90 tablet 1    pregabalin (LYRICA) 50 MG capsule Take 1 capsule (50 mg total) by mouth 2 (two) times daily. NOON & NIGHT TIME 180 capsule 1    temazepam (RESTORIL) 30 mg capsule Take 1 at bedtime 90 capsule 1    vitamin E 400 UNIT capsule Take 400 Units by mouth once daily.      zolpidem (AMBIEN) 10 mg Tab TAKE 1 TABLET BY MOUTH ONCE DAILY IN THE EVENING 90 tablet 1     [DISCONTINUED] vancomycin (VANCOCIN) 125 MG capsule Take 1 capsule (125 mg total) by mouth 4 (four) times daily for 7 days, THEN 1 capsule (125 mg total) 2 (two) times a day for 7 days, THEN 1 capsule (125 mg total) once daily for 7 days, THEN 1 capsule (125 mg total) every other day for 14 days, THEN 1 capsule (125 mg total) Every 3 (three) days for 14 days. 60 capsule 0    [DISCONTINUED] ferrous sulfate (FEOSOL) 325 mg (65 mg iron) Tab tablet Take 1 tablet (325 mg total) by mouth once daily. (Patient not taking: Reported on 2/8/2022) 100 tablet 3    [DISCONTINUED] LIDOCAINE VISCOUS 2 % solution SWISH AND SPIT 10 MLS EVERY 4 (FOUR) HOURS AS NEEDED (MOUTH&/OR THROAT PAIN). FOR MOUTH SORES 450 each 11    [DISCONTINUED] LIDOcaine-prilocaine (EMLA) cream Apply topically as needed. 30 g 2     Current Facility-Administered Medications on File Prior to Visit   Medication Dose Route Frequency Provider Last Rate Last Admin    denosumab (PROLIA) injection 60 mg  60 mg Subcutaneous Q6 Months Prasanth Johnson MD        lactated ringers infusion   Intravenous Continuous Jennifer Carr MD 10 mL/hr at 11/09/21 0717 Restarted at 11/09/21 0820       Medications have been reviewed and reconciled with patient at visit today.    Barriers to medications present (no )    Adverse reactions to current medications (no)    Over the counter medications reviewed (Yes) and if needed added to active Medication list.    Exam:  Vitals:    02/08/22 1112   BP: (!) 140/84   Pulse: 102   Temp: 98.2 °F (36.8 °C)     Weight: 74.2 kg (163 lb 9.3 oz)   Body mass index is 28.98 kg/m².      Physical Exam  Constitutional:       General: She is not in acute distress.     Appearance: She is normal weight. She is not toxic-appearing.   HENT:      Head: Normocephalic and atraumatic.      Right Ear: Tympanic membrane normal.      Left Ear: Tympanic membrane normal.      Nose: No congestion or rhinorrhea.      Mouth/Throat:      Mouth: Mucous membranes are  moist.      Pharynx: No oropharyngeal exudate or posterior oropharyngeal erythema.   Eyes:      Pupils: Pupils are equal, round, and reactive to light.   Cardiovascular:      Rate and Rhythm: Normal rate and regular rhythm.      Pulses: Normal pulses.      Heart sounds: Normal heart sounds.   Pulmonary:      Effort: Pulmonary effort is normal.      Breath sounds: Normal breath sounds.   Chest:       Abdominal:      General: Abdomen is flat. Bowel sounds are normal.      Palpations: Abdomen is soft.   Musculoskeletal:         General: Normal range of motion.      Cervical back: Normal range of motion.   Lymphadenopathy:      Cervical: No cervical adenopathy.   Skin:     General: Skin is warm and dry.      Capillary Refill: Capillary refill takes less than 2 seconds.   Neurological:      Mental Status: She is alert and oriented to person, place, and time.         Laboratory Reviewed: (Yes)  Lab Results   Component Value Date    WBC 4.46 02/08/2022    HGB 8.1 (L) 02/08/2022    HCT 26.0 (L) 02/08/2022     02/08/2022    CHOL 157 12/24/2021    TRIG 120 12/24/2021    HDL 42 12/24/2021    ALT 28 01/20/2022    AST 39 01/20/2022     02/08/2022    K 4.7 02/08/2022     02/08/2022    CREATININE 2.5 (H) 02/08/2022    BUN 30 (H) 02/08/2022    CO2 24 02/08/2022    TSH 5.158 (H) 12/24/2021    PSA <0.1 05/27/2008    INR 1.0 11/22/2021    HGBA1C 5.2 12/24/2021       Assessment:  The primary encounter diagnosis was Ductal carcinoma in situ (DCIS) of left breast. Diagnoses of Anemia due to stage 4 chronic kidney disease and Breast pain, left were also pertinent to this visit.    Plan:  Ductal carcinoma in situ (DCIS) of left breast  Comments:  Keep appt. with Dr. Burger to start radiation tx 2/10/22    Anemia due to stage 4 chronic kidney disease  Comments:  Dr. Collins following  Keep appts. for epo injections    Breast pain, left  Comments:  Ultrasound to left breast  Differential includes infection, new mass, scar  tissue.   Suspect recurrence of infection. Breast U/S ASAP.     Left breast pain with new mass: Ultrasound to left breast ASAP.  Differential includes recurrent infection, new mass, scar tissue.   Immunocompromised. No constitutional sx today.   Breast U/S ASAP - scheduled tomorrow at 9 am with diagnostic mammogram.   Not sure pt will tolerate mammo at this time d/t painful site.   Will message Dr Burger since starting xrt in 2 days.      -Patient's lab results were reviewed and discussed with patient  -Treatment options and alternatives were discussed with the patient. Patient expressed understanding. Patient was given the opportunity to ask questions and be an active participant in their medical care. Patient had no further questions or concerns at this time.   -Documentation of patient's health and condition was obtained from family member who was present during visit.  -Patient is an overall moderate risk for health complications from their medical conditions.     Follow up: Follow up in about 2 weeks (around 2/22/2022) for F/U depends on outcome of breast U/S.      Care Plan/Goals: Reviewed Yes  Goals    None             After visit summary printed and given to patient upon discharge.  Patient goals and care plan are included in After visit summary.

## 2022-02-09 ENCOUNTER — HOSPITAL ENCOUNTER (OUTPATIENT)
Dept: RADIOLOGY | Facility: HOSPITAL | Age: 67
Discharge: HOME | End: 2022-02-09
Attending: NURSE PRACTITIONER
Payer: MEDICARE

## 2022-02-09 ENCOUNTER — INFUSION (OUTPATIENT)
Dept: INFUSION THERAPY | Facility: HOSPITAL | Age: 67
End: 2022-02-09
Attending: NURSE PRACTITIONER
Payer: MEDICARE

## 2022-02-09 ENCOUNTER — OFFICE VISIT (OUTPATIENT)
Dept: HEMATOLOGY/ONCOLOGY | Facility: CLINIC | Age: 67
End: 2022-02-09
Payer: MEDICARE

## 2022-02-09 VITALS
OXYGEN SATURATION: 96 % | DIASTOLIC BLOOD PRESSURE: 89 MMHG | SYSTOLIC BLOOD PRESSURE: 144 MMHG | RESPIRATION RATE: 18 BRPM | HEIGHT: 62 IN | SYSTOLIC BLOOD PRESSURE: 147 MMHG | TEMPERATURE: 97 F | BODY MASS INDEX: 30.1 KG/M2 | HEART RATE: 105 BPM | TEMPERATURE: 98 F | DIASTOLIC BLOOD PRESSURE: 86 MMHG | WEIGHT: 163.56 LBS | HEART RATE: 114 BPM | OXYGEN SATURATION: 96 %

## 2022-02-09 DIAGNOSIS — D05.12 DUCTAL CARCINOMA IN SITU (DCIS) OF LEFT BREAST: ICD-10-CM

## 2022-02-09 DIAGNOSIS — N18.4 ANEMIA ASSOCIATED WITH STAGE 4 CHRONIC RENAL FAILURE: ICD-10-CM

## 2022-02-09 DIAGNOSIS — M81.8 OTHER OSTEOPOROSIS WITHOUT CURRENT PATHOLOGICAL FRACTURE: ICD-10-CM

## 2022-02-09 DIAGNOSIS — D63.1 ANEMIA ASSOCIATED WITH STAGE 4 CHRONIC RENAL FAILURE: ICD-10-CM

## 2022-02-09 DIAGNOSIS — D63.1 ANEMIA ASSOCIATED WITH STAGE 4 CHRONIC RENAL FAILURE: Primary | ICD-10-CM

## 2022-02-09 DIAGNOSIS — N18.4 ANEMIA ASSOCIATED WITH STAGE 4 CHRONIC RENAL FAILURE: Primary | ICD-10-CM

## 2022-02-09 DIAGNOSIS — C50.912 MALIGNANT NEOPLASM OF LEFT FEMALE BREAST, UNSPECIFIED ESTROGEN RECEPTOR STATUS, UNSPECIFIED SITE OF BREAST: ICD-10-CM

## 2022-02-09 DIAGNOSIS — N64.4 BREAST PAIN, LEFT: ICD-10-CM

## 2022-02-09 PROCEDURE — 3079F DIAST BP 80-89 MM HG: CPT | Mod: HCNC,CPTII,S$GLB, | Performed by: NURSE PRACTITIONER

## 2022-02-09 PROCEDURE — 77065 DX MAMMO INCL CAD UNI: CPT | Mod: TC,HCNC,LT

## 2022-02-09 PROCEDURE — 99999 PR PBB SHADOW E&M-EST. PATIENT-LVL IV: CPT | Mod: PBBFAC,HCNC,, | Performed by: NURSE PRACTITIONER

## 2022-02-09 PROCEDURE — 1101F PT FALLS ASSESS-DOCD LE1/YR: CPT | Mod: HCNC,CPTII,S$GLB, | Performed by: NURSE PRACTITIONER

## 2022-02-09 PROCEDURE — 99214 OFFICE O/P EST MOD 30 MIN: CPT | Mod: 25,HCNC,S$GLB, | Performed by: NURSE PRACTITIONER

## 2022-02-09 PROCEDURE — 3077F SYST BP >= 140 MM HG: CPT | Mod: HCNC,CPTII,S$GLB, | Performed by: NURSE PRACTITIONER

## 2022-02-09 PROCEDURE — 76642 US BREAST LEFT LIMITED: ICD-10-PCS | Mod: 26,HCNC,LT, | Performed by: RADIOLOGY

## 2022-02-09 PROCEDURE — 99214 PR OFFICE/OUTPT VISIT, EST, LEVL IV, 30-39 MIN: ICD-10-PCS | Mod: 25,HCNC,S$GLB, | Performed by: NURSE PRACTITIONER

## 2022-02-09 PROCEDURE — 1125F AMNT PAIN NOTED PAIN PRSNT: CPT | Mod: HCNC,CPTII,S$GLB, | Performed by: NURSE PRACTITIONER

## 2022-02-09 PROCEDURE — 76642 ULTRASOUND BREAST LIMITED: CPT | Mod: 26,HCNC,LT, | Performed by: RADIOLOGY

## 2022-02-09 PROCEDURE — 1159F PR MEDICATION LIST DOCUMENTED IN MEDICAL RECORD: ICD-10-PCS | Mod: HCNC,CPTII,S$GLB, | Performed by: NURSE PRACTITIONER

## 2022-02-09 PROCEDURE — 1157F ADVNC CARE PLAN IN RCRD: CPT | Mod: HCNC,CPTII,S$GLB, | Performed by: NURSE PRACTITIONER

## 2022-02-09 PROCEDURE — 3077F PR MOST RECENT SYSTOLIC BLOOD PRESSURE >= 140 MM HG: ICD-10-PCS | Mod: HCNC,CPTII,S$GLB, | Performed by: NURSE PRACTITIONER

## 2022-02-09 PROCEDURE — 1160F RVW MEDS BY RX/DR IN RCRD: CPT | Mod: HCNC,CPTII,S$GLB, | Performed by: NURSE PRACTITIONER

## 2022-02-09 PROCEDURE — 1157F PR ADVANCE CARE PLAN OR EQUIV PRESENT IN MEDICAL RECORD: ICD-10-PCS | Mod: HCNC,CPTII,S$GLB, | Performed by: NURSE PRACTITIONER

## 2022-02-09 PROCEDURE — 3066F NEPHROPATHY DOC TX: CPT | Mod: HCNC,CPTII,S$GLB, | Performed by: NURSE PRACTITIONER

## 2022-02-09 PROCEDURE — 3008F PR BODY MASS INDEX (BMI) DOCUMENTED: ICD-10-PCS | Mod: HCNC,CPTII,S$GLB, | Performed by: NURSE PRACTITIONER

## 2022-02-09 PROCEDURE — 1160F PR REVIEW ALL MEDS BY PRESCRIBER/CLIN PHARMACIST DOCUMENTED: ICD-10-PCS | Mod: HCNC,CPTII,S$GLB, | Performed by: NURSE PRACTITIONER

## 2022-02-09 PROCEDURE — 3288F FALL RISK ASSESSMENT DOCD: CPT | Mod: HCNC,CPTII,S$GLB, | Performed by: NURSE PRACTITIONER

## 2022-02-09 PROCEDURE — 3079F PR MOST RECENT DIASTOLIC BLOOD PRESSURE 80-89 MM HG: ICD-10-PCS | Mod: HCNC,CPTII,S$GLB, | Performed by: NURSE PRACTITIONER

## 2022-02-09 PROCEDURE — 3288F PR FALLS RISK ASSESSMENT DOCUMENTED: ICD-10-PCS | Mod: HCNC,CPTII,S$GLB, | Performed by: NURSE PRACTITIONER

## 2022-02-09 PROCEDURE — 77065 MAMMO DIGITAL DIAGNOSTIC LEFT: ICD-10-PCS | Mod: 26,HCNC,LT, | Performed by: RADIOLOGY

## 2022-02-09 PROCEDURE — 99999 PR PBB SHADOW E&M-EST. PATIENT-LVL IV: ICD-10-PCS | Mod: PBBFAC,HCNC,, | Performed by: NURSE PRACTITIONER

## 2022-02-09 PROCEDURE — 77065 DX MAMMO INCL CAD UNI: CPT | Mod: 26,HCNC,LT, | Performed by: RADIOLOGY

## 2022-02-09 PROCEDURE — 96372 THER/PROPH/DIAG INJ SC/IM: CPT | Mod: HCNC

## 2022-02-09 PROCEDURE — 76642 ULTRASOUND BREAST LIMITED: CPT | Mod: TC,HCNC,LT

## 2022-02-09 PROCEDURE — 1159F MED LIST DOCD IN RCRD: CPT | Mod: HCNC,CPTII,S$GLB, | Performed by: NURSE PRACTITIONER

## 2022-02-09 PROCEDURE — 1125F PR PAIN SEVERITY QUANTIFIED, PAIN PRESENT: ICD-10-PCS | Mod: HCNC,CPTII,S$GLB, | Performed by: NURSE PRACTITIONER

## 2022-02-09 PROCEDURE — 3008F BODY MASS INDEX DOCD: CPT | Mod: HCNC,CPTII,S$GLB, | Performed by: NURSE PRACTITIONER

## 2022-02-09 PROCEDURE — 63600175 PHARM REV CODE 636 W HCPCS: Mod: JG,HCNC,EC | Performed by: INTERNAL MEDICINE

## 2022-02-09 PROCEDURE — 1101F PR PT FALLS ASSESS DOC 0-1 FALLS W/OUT INJ PAST YR: ICD-10-PCS | Mod: HCNC,CPTII,S$GLB, | Performed by: NURSE PRACTITIONER

## 2022-02-09 PROCEDURE — 3066F PR DOCUMENTATION OF TREATMENT FOR NEPHROPATHY: ICD-10-PCS | Mod: HCNC,CPTII,S$GLB, | Performed by: NURSE PRACTITIONER

## 2022-02-09 RX ORDER — ACETAMINOPHEN 325 MG/1
650 TABLET ORAL
Status: CANCELLED
Start: 2022-02-09

## 2022-02-09 RX ORDER — ACETAMINOPHEN 325 MG/1
650 TABLET ORAL
Status: CANCELLED
Start: 2022-02-16

## 2022-02-09 RX ADMIN — EPOETIN ALFA-EPBX 20000 UNITS: 20000 INJECTION, SOLUTION INTRAVENOUS; SUBCUTANEOUS at 02:02

## 2022-02-09 NOTE — ASSESSMENT & PLAN NOTE
-Hg 8.1  -plan to initiate Retacrit 20,000 units injection today  -Iron studies appear mixed. Consistent with chronic illness/inflammation. For now will hold off on iron supplementation. Encourage iron rich foods  -Proceed with Retacrit today. Repeat in 2 weeks. F/u 4 weeks with cbc to determine need for additional Retacrit

## 2022-02-09 NOTE — PROGRESS NOTES
Subjective:       Patient ID: Nadia Damon is a 66 y.o. female.    Chief Complaint: review labs. Initiation of Retacrit    HPI: 66 y.o female with h/o of heart transplant in  (on anti-rejection medication), CKD, triple negative breast ca with lymphnode involovement. Initiation of chemotherapy 2021 (Taxotere/Cytoxan) with Udenyca 2021. Unfortunately chemotherapy was complicated by pancytopenia and neutropenic fever resulting in hospitalization x 2. Patient decided on no further chemotherapy    Today she is here for lab review and consideration for EPO therapy for anemia of CKD. Was seen by PCP on yesterday for c/o left breast pain. Today she had diagnostic mammogram and left breast ultrasound. She feels okay outside of breast pain. Controlled with Tylenol.  Social History     Socioeconomic History    Marital status: Single    Number of children: 2    Highest education level: 11th grade   Occupational History    Occupation: Retired   Tobacco Use    Smoking status: Never Smoker    Smokeless tobacco: Never Used   Substance and Sexual Activity    Alcohol use: Never     Alcohol/week: 0.0 standard drinks    Drug use: No    Sexual activity: Not Currently     Partners: Male     Birth control/protection: See Surgical Hx   Other Topics Concern    Are you pregnant or think you may be? No    Breast-feeding No   Social History Narrative    Single. 2 children , 1  at 31 yoa  2014 strep throat -  pneumonia and renal complications after not completing course of AB. Other child lives in [place], [place]. Has a cousin locally that could help in an emergency. 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 Caffeine intake =- 1 cola a day. No coffee, + occasional tea, avoids caffeine especially at night. Still drives. She does not have a Living Will or Advanced directive.      Social Determinants of Health     Financial Resource Strain: Low Risk     Difficulty of Paying Living  Expenses: Not hard at all   Food Insecurity: No Food Insecurity    Worried About Running Out of Food in the Last Year: Never true    Ran Out of Food in the Last Year: Never true   Transportation Needs: No Transportation Needs    Lack of Transportation (Medical): No    Lack of Transportation (Non-Medical): No   Physical Activity: Insufficiently Active    Days of Exercise per Week: 2 days    Minutes of Exercise per Session: 10 min   Stress: No Stress Concern Present    Feeling of Stress : Not at all   Social Connections: Socially Isolated    Frequency of Communication with Friends and Family: Once a week    Frequency of Social Gatherings with Friends and Family: Once a week    Attends Sabianist Services: More than 4 times per year    Active Member of Clubs or Organizations: No    Attends Club or Organization Meetings: Never    Marital Status: Never    Housing Stability: Low Risk     Unable to Pay for Housing in the Last Year: No    Number of Places Lived in the Last Year: 1    Unstable Housing in the Last Year: No       Past Medical History:   Diagnosis Date    Abdominal wall hernia     CT Renal 6/11/2018---Small fat containing superior ventral abdominal wall hernia at the epicardial pacing lead site.    Anxiety     Arthritis     ZEN HIPS    Breast cancer in female 08/2021    LEFT BREAST    Cellulitis of axilla, left 12/23/2021    Chronic diastolic heart failure 12/16/2021    Chronic kidney disease     stage 4, GFR 15-29 ml/min    Chronic midline low back pain without sciatica 6/18/2018    Coronary artery disease 1993    heart transplant    Depression     Fibromyalgia     on Lyrica    Heart failure     native heart cardiomyopathy    Heart transplanted 1993    due to cardiomyopathy    History of hyperparathyroidism; Hyperparathyroidism, secondary renal     PT DENIES    Hypertension     Immune disorder     anti rejection meds    Iron deficiency anemia 8/15/2017    Kidney stones      passed per pt    Obesity     Other osteoporosis without current pathological fracture 8/30/2019    Shingles 2003 approx    left leg    Trouble in sleeping     Urinary incontinence        Family History   Problem Relation Age of Onset    Cancer Mother 38        breast    Breast cancer Mother     Heart disease Maternal Grandmother     Cataracts Cousin     Hypertension Son     Diabetes Neg Hx     Stroke Neg Hx     Kidney disease Neg Hx     Asthma Neg Hx     COPD Neg Hx     Melanoma Neg Hx     Hyperlipidemia Neg Hx        Past Surgical History:   Procedure Laterality Date    BLADDER SURGERY  2015 approx    mesh - Dr Everett then 2nd reconstructive sx Dr Onofre    BREAST BIOPSY Bilateral     NEGATIVE    BREAST SURGERY Left 9/28/15    Bx - benign    BREAST SURGERY Right 12/2015    Bx benign    CARDIAC PACEMAKER REMOVAL Left 06/26/2014    Pacer defirillator removed. Put in 1993 aat time of heart transplant    CARPAL TUNNEL RELEASE Left 03/03/15    Dr. Hall    COLONOSCOPY N/A 2/25/2021    Procedure: COLONOSCOPY;  Surgeon: Freida Ramirez MD;  Location: [facility];  Service: Endoscopy;  Laterality: N/A;    HEART TRANSPLANT  1993    HERNIA REPAIR Right 1971 approx    Inguinal    HYSTERECTOMY  1983    vag hyst /LSO     INCISION AND DRAINAGE OF ABSCESS Left 12/24/2021    Procedure: INCISION AND DRAINAGE, ABSCESS;  Surgeon: Joseph Longo MD;  Location: [facility] OR;  Service: General;  Laterality: Left;    INSERTION OF TUNNELED CENTRAL VENOUS CATHETER (CVC) WITH SUBCUTANEOUS PORT N/A 11/9/2021    Procedure: [id number];  Surgeon: Christoph Douglas MD;  Location: [facility] OR;  Service: General;  Laterality: N/A;    REMOVAL OF VASCULAR ACCESS PORT      SENTINEL LYMPH NODE BIOPSY Left 10/12/2021    Procedure: BIOPSY, LYMPH NODE, SENTINEL;  Surgeon: Christoph Douglas MD;  Location: [facility] OR;  Service: General;  Laterality: Left;    TOE SURGERY         Review of Systems   Constitutional: Negative  for activity change, appetite change, chills, fatigue, fever and unexpected weight change.   HENT: Negative for congestion, mouth sores, nosebleeds, sore throat, trouble swallowing and voice change.    Eyes: Negative for visual disturbance.   Respiratory: Negative for cough, chest tightness, shortness of breath and wheezing.    Cardiovascular: Negative for chest pain, palpitations and leg swelling.   Gastrointestinal: Negative for abdominal distention, abdominal pain, anal bleeding, blood in stool, constipation, diarrhea, nausea and vomiting.   Genitourinary: Negative for difficulty urinating, dysuria and hematuria.   Musculoskeletal: Negative for arthralgias, back pain and myalgias.        Breast pain s/p mammogram   Skin: Negative for pallor, rash and wound.   Neurological: Negative for dizziness, syncope, weakness and headaches.   Hematological: Negative for adenopathy. Does not bruise/bleed easily.   Psychiatric/Behavioral: The patient is nervous/anxious.          Medication List with Changes/Refills   Current Medications    ACETAMINOPHEN (TYLENOL) 325 MG TABLET    Take 1 tablet (325 mg total) by mouth every 6 (six) hours as needed for Pain.    ASPIRIN (ECOTRIN) 81 MG EC TABLET    Take 1 tablet (81 mg total) by mouth once daily.    BIOTIN 10,000 MCG CAP    Take 1 tablet by mouth once daily.    BUSPIRONE (BUSPAR) 10 MG TABLET    Take 1 tablet (10 mg total) by mouth once daily.    CALCITONIN, SALMON, (FORTICAL) 200 UNIT/ACTUATION NASAL SPRAY    1 spray by Nasal route once daily.    CARVEDILOL (COREG) 6.25 MG TABLET    Take 1 tablet (6.25 mg total) by mouth 2 (two) times daily.    CLONIDINE (CATAPRES) 0.1 MG TABLET    Take 1 tablet (0.1 mg total) by mouth nightly as needed (BP >150/95).    CYCLOSPORINE MODIFIED, NEORAL, (NEORAL) 25 MG CAPSULE    Take 3 capsules (75 mg total) by mouth 2 (two) times daily.    DULOXETINE (CYMBALTA) 30 MG CAPSULE    TAKE 1 CAPSULE EVERY DAY    EVENING PRIMROSE OIL ORAL    Take 1,000 mg  by mouth once daily.    FUROSEMIDE (LASIX) 20 MG TABLET    Take 1 tablet (20 mg total) by mouth 2 (two) times a day for 5 days, THEN 1 tablet (20 mg total) once daily. Take 1 tablet daily.    HYDRALAZINE (APRESOLINE) 50 MG TABLET    TAKE 2 TABLETS  EVERY 8  HOURS.    LOSARTAN (COZAAR) 25 MG TABLET    Take 1 tablet (25 mg total) by mouth once daily.    MULTIVITAMIN CAPSULE    Take 1 capsule by mouth once daily.    ONDANSETRON (ZOFRAN-ODT) 8 MG TBDL    Take 1 tablet (8 mg total) by mouth every 8 (eight) hours as needed (nausea).    PANTOPRAZOLE (PROTONIX) 40 MG TABLET    Take 1 tablet (40 mg total) by mouth once daily.    PREGABALIN (LYRICA) 50 MG CAPSULE    Take 1 capsule (50 mg total) by mouth 2 (two) times daily. NOON & NIGHT TIME    TEMAZEPAM (RESTORIL) 30 MG CAPSULE    Take 1 at bedtime    VITAMIN E 400 UNIT CAPSULE    Take 400 Units by mouth once daily.    ZOLPIDEM (AMBIEN) 10 MG TAB    TAKE 1 TABLET BY MOUTH ONCE DAILY IN THE EVENING     Objective:     Vitals:    02/09/22 1410   BP: (!) 147/86   Pulse: (!) 114   Temp: 97.1 °F (36.2 °C)     Lab Results   Component Value Date    WBC 4.46 02/08/2022    HGB 8.1 (L) 02/08/2022    HCT 26.0 (L) 02/08/2022    MCV 92 02/08/2022     02/08/2022       BMP  Lab Results   Component Value Date     02/08/2022    K 4.7 02/08/2022     02/08/2022    CO2 24 02/08/2022    BUN 30 (H) 02/08/2022    CREATININE 2.5 (H) 02/08/2022    CALCIUM 8.8 02/08/2022    ANIONGAP 12 02/08/2022    ESTGFRAFRICA 22 (A) 02/08/2022    EGFRNONAA 19 (A) 02/08/2022     Lab Results   Component Value Date    ALT 28 01/20/2022    AST 39 01/20/2022    ALKPHOS 143 (H) 01/20/2022    BILITOT 0.5 01/20/2022     Lab Results   Component Value Date    IRON 36 02/08/2022    TIBC 234 (L) 02/08/2022    FERRITIN 473 (H) 02/08/2022       Physical Exam  Vitals reviewed.   Constitutional:       Appearance: She is well-developed.   HENT:      Head: Normocephalic.      Right Ear: External ear normal.       Left Ear: External ear normal.   Eyes:      General: Lids are normal. No scleral icterus.        Right eye: No discharge.         Left eye: No discharge.      Conjunctiva/sclera: Conjunctivae normal.   Neck:      Thyroid: No thyroid mass.   Cardiovascular:      Rate and Rhythm: Normal rate and regular rhythm.      Heart sounds: Normal heart sounds. No murmur heard.      Pulmonary:      Effort: Pulmonary effort is normal. No respiratory distress.      Breath sounds: Normal breath sounds. No wheezing or rales.   Abdominal:      General: Bowel sounds are normal. There is no distension.      Palpations: Abdomen is soft.   Genitourinary:     Comments: deferred  Musculoskeletal:         General: Normal range of motion.      Cervical back: Normal range of motion.   Skin:     General: Skin is warm and dry.   Neurological:      Mental Status: She is alert and oriented to person, place, and time.   Psychiatric:         Speech: Speech normal.         Behavior: Behavior normal. Behavior is cooperative.         Thought Content: Thought content normal.          Assessment:     Problem List Items Addressed This Visit        Renal/    Anemia associated with stage 4 chronic renal failure     -Hg 8.1  -plan to initiate Retacrit 20,000 units injection today  -Iron studies appear mixed. Consistent with chronic illness/inflammation. For now will hold off on iron supplementation. Encourage iron rich foods  -Proceed with Retacrit today. Repeat in 2 weeks. F/u 4 weeks with cbc to determine need for additional Retacrit            Oncology    Ductal carcinoma in situ (DCIS) of left breast     Patient seen by PCP on yesterday for evaluation of left breast pain with palpable mass findings. Patient had diagnostic mammogram and left breast US today with pending results    Previously scheduled to see Dr. Burger for consideration of radiation (appt tomorrow 2/10) She was previously intolerant to chemotherapy due to cytopenias    Plan to arrange  f/u with Primary Oncologist following the above                 Plan:     Ductal carcinoma in situ (DCIS) of left breast    Anemia associated with stage 4 chronic renal failure              EVELYN العراقيP-C

## 2022-02-09 NOTE — ASSESSMENT & PLAN NOTE
Patient seen by PCP on yesterday for evaluation of left breast pain with palpable mass findings. Patient had diagnostic mammogram and left breast US today with pending results    Previously scheduled to see Dr. Burger for consideration of radiation (appt tomorrow 2/10) She was previously intolerant to chemotherapy due to cytopenias    Plan to arrange f/u with Primary Oncologist following the above

## 2022-02-09 NOTE — DISCHARGE INSTRUCTIONS
.[facility]  [address]  [address]  [phone] phone     [phone] fax  Hours of Operation: Monday- Friday 8:00am- 5:00pm  After hours phone  [phone]  Hematology / Oncology Physicians on call    Dr. Dennis Ch      Nurse Practitioners:    ONELIA Ahmadi NP Phaon Dunbar, NP Jessica Porter, PA      Please don't hesitate to call if you have any concerns.

## 2022-02-10 ENCOUNTER — HOSPITAL ENCOUNTER (OUTPATIENT)
Dept: RADIOLOGY | Facility: HOSPITAL | Age: 67
Discharge: HOME | End: 2022-02-10
Attending: RADIOLOGY
Payer: MEDICARE

## 2022-02-10 ENCOUNTER — PATIENT MESSAGE (OUTPATIENT)
Dept: INTERNAL MEDICINE | Facility: CLINIC | Age: 67
End: 2022-02-10
Payer: MEDICARE

## 2022-02-10 ENCOUNTER — OFFICE VISIT (OUTPATIENT)
Dept: RADIATION ONCOLOGY | Facility: CLINIC | Age: 67
End: 2022-02-10
Payer: MEDICARE

## 2022-02-10 ENCOUNTER — TELEPHONE (OUTPATIENT)
Dept: INTERNAL MEDICINE | Facility: CLINIC | Age: 67
End: 2022-02-10
Payer: MEDICARE

## 2022-02-10 ENCOUNTER — TELEPHONE (OUTPATIENT)
Dept: HEMATOLOGY/ONCOLOGY | Facility: CLINIC | Age: 67
End: 2022-02-10
Payer: MEDICARE

## 2022-02-10 ENCOUNTER — HOSPITAL ENCOUNTER (OUTPATIENT)
Dept: RADIATION THERAPY | Facility: HOSPITAL | Age: 67
Discharge: HOME | End: 2022-02-10
Attending: RADIOLOGY
Payer: MEDICARE

## 2022-02-10 VITALS
HEIGHT: 62 IN | TEMPERATURE: 99 F | RESPIRATION RATE: 18 BRPM | HEART RATE: 105 BPM | OXYGEN SATURATION: 99 % | SYSTOLIC BLOOD PRESSURE: 145 MMHG | DIASTOLIC BLOOD PRESSURE: 87 MMHG | WEIGHT: 163.38 LBS | BODY MASS INDEX: 30.07 KG/M2

## 2022-02-10 DIAGNOSIS — D05.12 DUCTAL CARCINOMA IN SITU (DCIS) OF LEFT BREAST: Primary | ICD-10-CM

## 2022-02-10 PROCEDURE — 3079F PR MOST RECENT DIASTOLIC BLOOD PRESSURE 80-89 MM HG: ICD-10-PCS | Mod: HCNC,CPTII,S$GLB, | Performed by: RADIOLOGY

## 2022-02-10 PROCEDURE — 3079F DIAST BP 80-89 MM HG: CPT | Mod: HCNC,CPTII,S$GLB, | Performed by: RADIOLOGY

## 2022-02-10 PROCEDURE — 1101F PT FALLS ASSESS-DOCD LE1/YR: CPT | Mod: HCNC,CPTII,S$GLB, | Performed by: RADIOLOGY

## 2022-02-10 PROCEDURE — 1157F PR ADVANCE CARE PLAN OR EQUIV PRESENT IN MEDICAL RECORD: ICD-10-PCS | Mod: HCNC,CPTII,S$GLB, | Performed by: RADIOLOGY

## 2022-02-10 PROCEDURE — 1126F PR PAIN SEVERITY QUANTIFIED, NO PAIN PRESENT: ICD-10-PCS | Mod: HCNC,CPTII,S$GLB, | Performed by: RADIOLOGY

## 2022-02-10 PROCEDURE — 1126F AMNT PAIN NOTED NONE PRSNT: CPT | Mod: HCNC,CPTII,S$GLB, | Performed by: RADIOLOGY

## 2022-02-10 PROCEDURE — 99999 PR PBB SHADOW E&M-EST. PATIENT-LVL IV: CPT | Mod: PBBFAC,HCNC,, | Performed by: RADIOLOGY

## 2022-02-10 PROCEDURE — 3066F NEPHROPATHY DOC TX: CPT | Mod: HCNC,CPTII,S$GLB, | Performed by: RADIOLOGY

## 2022-02-10 PROCEDURE — 99214 PR OFFICE/OUTPT VISIT, EST, LEVL IV, 30-39 MIN: ICD-10-PCS | Mod: HCNC,S$GLB,, | Performed by: RADIOLOGY

## 2022-02-10 PROCEDURE — 99214 OFFICE O/P EST MOD 30 MIN: CPT | Mod: HCNC,S$GLB,, | Performed by: RADIOLOGY

## 2022-02-10 PROCEDURE — 1159F PR MEDICATION LIST DOCUMENTED IN MEDICAL RECORD: ICD-10-PCS | Mod: HCNC,CPTII,S$GLB, | Performed by: RADIOLOGY

## 2022-02-10 PROCEDURE — 99999 PR PBB SHADOW E&M-EST. PATIENT-LVL IV: ICD-10-PCS | Mod: PBBFAC,HCNC,, | Performed by: RADIOLOGY

## 2022-02-10 PROCEDURE — 3066F PR DOCUMENTATION OF TREATMENT FOR NEPHROPATHY: ICD-10-PCS | Mod: HCNC,CPTII,S$GLB, | Performed by: RADIOLOGY

## 2022-02-10 PROCEDURE — 3077F PR MOST RECENT SYSTOLIC BLOOD PRESSURE >= 140 MM HG: ICD-10-PCS | Mod: HCNC,CPTII,S$GLB, | Performed by: RADIOLOGY

## 2022-02-10 PROCEDURE — 1159F MED LIST DOCD IN RCRD: CPT | Mod: HCNC,CPTII,S$GLB, | Performed by: RADIOLOGY

## 2022-02-10 PROCEDURE — 1101F PR PT FALLS ASSESS DOC 0-1 FALLS W/OUT INJ PAST YR: ICD-10-PCS | Mod: HCNC,CPTII,S$GLB, | Performed by: RADIOLOGY

## 2022-02-10 PROCEDURE — 1157F ADVNC CARE PLAN IN RCRD: CPT | Mod: HCNC,CPTII,S$GLB, | Performed by: RADIOLOGY

## 2022-02-10 PROCEDURE — 3288F PR FALLS RISK ASSESSMENT DOCUMENTED: ICD-10-PCS | Mod: HCNC,CPTII,S$GLB, | Performed by: RADIOLOGY

## 2022-02-10 PROCEDURE — 3008F BODY MASS INDEX DOCD: CPT | Mod: HCNC,CPTII,S$GLB, | Performed by: RADIOLOGY

## 2022-02-10 PROCEDURE — 3288F FALL RISK ASSESSMENT DOCD: CPT | Mod: HCNC,CPTII,S$GLB, | Performed by: RADIOLOGY

## 2022-02-10 PROCEDURE — 3077F SYST BP >= 140 MM HG: CPT | Mod: HCNC,CPTII,S$GLB, | Performed by: RADIOLOGY

## 2022-02-10 PROCEDURE — 3008F PR BODY MASS INDEX (BMI) DOCUMENTED: ICD-10-PCS | Mod: HCNC,CPTII,S$GLB, | Performed by: RADIOLOGY

## 2022-02-10 NOTE — TELEPHONE ENCOUNTER
Patient mammogram concluded, left breat appears to be a collection of fluid. Ms.Stacey COLLADO would like patient to be seen as soon as possible. Can you assist with a sooner appt than 2/28?

## 2022-02-10 NOTE — PROGRESS NOTES
"[facility] [place]  RADIATION ONCOLOGY FOLLOW UP    Name: Nadia Damon  : 1955      DIAGNOSIS: L breast microinvasive carcinoma, pTmi(m) N1mi(sn) cM0, ER/MI negative, high grade    TREATMENT HISTORY:   1. L lumpectomy 9/10/21  2. L sentinel node biopsy 10/12/21  3. Attempted chemotherapy  4. Planned adjuvant radiation    INTERVAL HISTORY:  Nadia Damon is a 66 y.o. female who presents for follow up for the above diagnosis.   Since consultation she has undergone L lumpectomy 9/10/21 by Dr. Douglas.  Pathology showed multifocal microinvasive carcinoma with associated extensive high grade DCIS with necrosis, 4cm, microinvasive 2mm carcinoma, good margins, (mpTmi).  She then went back for sentinel node biopsy 10/12/21 and path showed 1/2 nodes with carcinoma 1.5mm, no THU.  She had infection under L axilla which required packing and delayed wound healing.  She had 1 cycle of TC but tolerated poorly so it was discontinued.    This week noted L breast mass at 2:00 and presented to primary care.  MMG and US 22 showed L breast 4.1 x 3.6 x 3.3cm cyst at 3:00 probably benign recommend short term follow up.    She denies skin changes, nipple changes, new axillary/supraclavicular masses, discharge, induration, or erythema.     PHYSICAL EXAMINATION:  Constitutional: well appearing, no acute distress, ECOG 0 - Fully Active  Vitals:    BP (!) 145/87   Pulse 105   Temp 98.9 °F (37.2 °C)   Resp 18   Ht 5' 2" (1.575 m)   Wt 74.1 kg (163 lb 6.4 oz)   LMP 1983 (Within Years)   SpO2 99%   BMI 29.89 kg/m²   Eyes: sclera anicteric, EOMI, pupils equal, round and reactive to light  ENT: oral cavity without lesions, moist mucous membranes  Neck: trachea midline, neck supple  Lymphatic: no cervical, supraclavicular or axillary adenopathy  Breast: firm 3cm mass at 2:00 L breast, skin changes or retractions, non-tender, incision healing well on L breast and L axilla, no cording  Cardiovascular: " regular rate, no edema of the upper or lower extremities, radial pulse 2+  Respiratory: unlabored effort, clear to auscultation, no wheezes  Abdomen: soft, non-tender, no rigidity, no masses, no hepatomegaly    IMAGING AND LABORATORY FINDINGS: As per HPI; images and pathology reviewed personally.    ASSESSMENT: 66 y.o. female with L breast microinvasive carcinoma, pTmi(m) N1mi(sn) cM0, ER/LA negative, high grade s/p lumpectomy    PLAN: Ms. Damon had some surprising findings in her pathology but is now ready for adjuvant radiation. I recommend proceeding with L whole breast radiation 42.56Gy/16fx using DIBH to minimize heart dose and also including coverage to L axilla given sentinel node findings.     However, recent development of mass/cyst in L breast has impacted plans. I would prefer her be evaluated by Dr. Douglas (sees on 2/14) first to see if any intervention is needed prior to starting radiation.     No simulation today, informed consent was obtained and we will await results of appt next week. If fluid can be drained (without infection) will bring back in 1-2 weeks for planning.    I spent approximately 30 minutes reviewing the available records and evaluating the patient, out of which over 50% of the time was spent face to face with the patient in counseling and coordinating this patient's care.    Samir Burger III, M.D.  Radiation Oncology  [facility] [address] [place] [address]  Ph: [phone]  [email or web address]

## 2022-02-11 ENCOUNTER — TELEPHONE (OUTPATIENT)
Dept: SURGERY | Facility: CLINIC | Age: 67
End: 2022-02-11
Payer: MEDICARE

## 2022-02-11 NOTE — TELEPHONE ENCOUNTER
----- Message from Shila Coats sent at 2/11/2022  9:08 AM CST -----  Type:  Patient Returning Call    Who Called:patient  Who Left Message for Patient:nurse  Does the patient know what this is regarding?:na  Would the patient rather a call back or a response via [email or web address]? Call back  Best Call Back Number:[phone]  Additional Information: na

## 2022-02-14 ENCOUNTER — OFFICE VISIT (OUTPATIENT)
Dept: SURGERY | Facility: CLINIC | Age: 67
End: 2022-02-14
Payer: MEDICARE

## 2022-02-14 VITALS
HEART RATE: 112 BPM | DIASTOLIC BLOOD PRESSURE: 71 MMHG | SYSTOLIC BLOOD PRESSURE: 126 MMHG | TEMPERATURE: 98 F | BODY MASS INDEX: 30.2 KG/M2 | WEIGHT: 165.13 LBS

## 2022-02-14 DIAGNOSIS — N61.1 BREAST ABSCESS: Primary | ICD-10-CM

## 2022-02-14 PROCEDURE — 99999 PR PBB SHADOW E&M-EST. PATIENT-LVL IV: CPT | Mod: PBBFAC,HCNC,, | Performed by: SURGERY

## 2022-02-14 PROCEDURE — 1159F PR MEDICATION LIST DOCUMENTED IN MEDICAL RECORD: ICD-10-PCS | Mod: HCNC,CPTII,S$GLB, | Performed by: SURGERY

## 2022-02-14 PROCEDURE — 1125F PR PAIN SEVERITY QUANTIFIED, PAIN PRESENT: ICD-10-PCS | Mod: HCNC,CPTII,S$GLB, | Performed by: SURGERY

## 2022-02-14 PROCEDURE — 19000 PR PUNC/ASPIR/DRAIN CYST, BREAST: ICD-10-PCS | Mod: HCNC,LT,S$GLB, | Performed by: SURGERY

## 2022-02-14 PROCEDURE — 99214 PR OFFICE/OUTPT VISIT, EST, LEVL IV, 30-39 MIN: ICD-10-PCS | Mod: 25,HCNC,S$GLB, | Performed by: SURGERY

## 2022-02-14 PROCEDURE — 1157F PR ADVANCE CARE PLAN OR EQUIV PRESENT IN MEDICAL RECORD: ICD-10-PCS | Mod: HCNC,CPTII,S$GLB, | Performed by: SURGERY

## 2022-02-14 PROCEDURE — 99999 PR PBB SHADOW E&M-EST. PATIENT-LVL IV: ICD-10-PCS | Mod: PBBFAC,HCNC,, | Performed by: SURGERY

## 2022-02-14 PROCEDURE — 1160F RVW MEDS BY RX/DR IN RCRD: CPT | Mod: HCNC,CPTII,S$GLB, | Performed by: SURGERY

## 2022-02-14 PROCEDURE — 1125F AMNT PAIN NOTED PAIN PRSNT: CPT | Mod: HCNC,CPTII,S$GLB, | Performed by: SURGERY

## 2022-02-14 PROCEDURE — 99214 OFFICE O/P EST MOD 30 MIN: CPT | Mod: 25,HCNC,S$GLB, | Performed by: SURGERY

## 2022-02-14 PROCEDURE — 1159F MED LIST DOCD IN RCRD: CPT | Mod: HCNC,CPTII,S$GLB, | Performed by: SURGERY

## 2022-02-14 PROCEDURE — 3078F DIAST BP <80 MM HG: CPT | Mod: HCNC,CPTII,S$GLB, | Performed by: SURGERY

## 2022-02-14 PROCEDURE — 3066F PR DOCUMENTATION OF TREATMENT FOR NEPHROPATHY: ICD-10-PCS | Mod: HCNC,CPTII,S$GLB, | Performed by: SURGERY

## 2022-02-14 PROCEDURE — 3074F PR MOST RECENT SYSTOLIC BLOOD PRESSURE < 130 MM HG: ICD-10-PCS | Mod: HCNC,CPTII,S$GLB, | Performed by: SURGERY

## 2022-02-14 PROCEDURE — 1157F ADVNC CARE PLAN IN RCRD: CPT | Mod: HCNC,CPTII,S$GLB, | Performed by: SURGERY

## 2022-02-14 PROCEDURE — 3066F NEPHROPATHY DOC TX: CPT | Mod: HCNC,CPTII,S$GLB, | Performed by: SURGERY

## 2022-02-14 PROCEDURE — 3008F BODY MASS INDEX DOCD: CPT | Mod: HCNC,CPTII,S$GLB, | Performed by: SURGERY

## 2022-02-14 PROCEDURE — 1160F PR REVIEW ALL MEDS BY PRESCRIBER/CLIN PHARMACIST DOCUMENTED: ICD-10-PCS | Mod: HCNC,CPTII,S$GLB, | Performed by: SURGERY

## 2022-02-14 PROCEDURE — 19000 PUNCTURE ASPIR CYST BREAST: CPT | Mod: HCNC,LT,S$GLB, | Performed by: SURGERY

## 2022-02-14 PROCEDURE — 3074F SYST BP LT 130 MM HG: CPT | Mod: HCNC,CPTII,S$GLB, | Performed by: SURGERY

## 2022-02-14 PROCEDURE — 3008F PR BODY MASS INDEX (BMI) DOCUMENTED: ICD-10-PCS | Mod: HCNC,CPTII,S$GLB, | Performed by: SURGERY

## 2022-02-14 PROCEDURE — 3078F PR MOST RECENT DIASTOLIC BLOOD PRESSURE < 80 MM HG: ICD-10-PCS | Mod: HCNC,CPTII,S$GLB, | Performed by: SURGERY

## 2022-02-14 RX ORDER — CLINDAMYCIN HYDROCHLORIDE 300 MG/1
300 CAPSULE ORAL 3 TIMES DAILY
Qty: 15 CAPSULE | Refills: 0 | Status: SHIPPED | OUTPATIENT
Start: 2022-02-14 | End: 2022-02-19

## 2022-02-14 NOTE — PROGRESS NOTES
Patient ID: Nadia Damon is a 66 y.o. female.    Chief Complaint: breast cancer    HPI: 67 y/o female status post left breast lumpectomy 09/10/2021 s/p left breast  SLNbx 10/12/21 status post left axillary incision and drainage by Dr. Longo 12/24/2021 status post Port-A-Cath removal presents for left breast pain.  In the interim she has undergone ultrasound showing cystic fluid collection of the left breast.  She reports it is sensitive to touch.  Denies any fevers.    presents for postop. She is doing well with no complaints.    presents to discuss Left breast DCIS Er-/NH-. Recently underwent stereotactic core biopsy following abnormal mammogram. Denies any palpable mass.     Per Kristine Bellmawr:   Patient presents for the evaluation of an abnormal left breast mammogram    Pt heart transplant patient 27 years.     Risk factors identified:     Menarche at 14 y/o  G 2 P 2  First pregnancy at 16  LMP: partial hyst - 1984  Estrogen:none  Radiation to the neck or chest wall- none  Prior breast biopsies or atypical hyperplasia- right breast excisional biopsy- benign- left core biopsy benign in past       FH: mother breast cancer at 40's.     Body mass index is 30.2 kg/m².    Review of Systems   Constitutional: Negative.    HENT: Negative.    Eyes: Negative.    Respiratory: Negative.    Cardiovascular: Negative.    Gastrointestinal: Negative.         No reflux   Endocrine: Negative.    Genitourinary: Negative.    Musculoskeletal: Negative.    Skin: Negative.    Allergic/Immunologic: Negative.    Neurological: Negative.    Hematological: Negative.  Negative for adenopathy.   Psychiatric/Behavioral: Negative.    Breast: Pt denies any breast pain, has left lateral chest wall tenderness after last biopsy, nipple discharge, or palpable mass. No prior trauma or bruising. No breast surgeries or abnormalities.      Current Outpatient Medications   Medication Sig Dispense Refill    acetaminophen (TYLENOL) 325 MG tablet  Take 1 tablet (325 mg total) by mouth every 6 (six) hours as needed for Pain.  0    aspirin (ECOTRIN) 81 MG EC tablet Take 1 tablet (81 mg total) by mouth once daily. 90 tablet 3    biotin 10,000 mcg Cap Take 1 tablet by mouth once daily.      busPIRone (BUSPAR) 10 MG tablet Take 1 tablet (10 mg total) by mouth once daily. 90 tablet 3    calcitonin, salmon, (FORTICAL) 200 unit/actuation nasal spray 1 spray by Nasal route once daily. 3.7 mL 3    carvediloL (COREG) 6.25 MG tablet Take 1 tablet (6.25 mg total) by mouth 2 (two) times daily. 180 tablet 3    cloNIDine (CATAPRES) 0.1 MG tablet Take 1 tablet (0.1 mg total) by mouth nightly as needed (BP >150/95). 90 tablet 3    cycloSPORINE modified, NEORAL, (NEORAL) 25 MG capsule Take 3 capsules (75 mg total) by mouth 2 (two) times daily. 270 capsule 11    DULoxetine (CYMBALTA) 30 MG capsule TAKE 1 CAPSULE EVERY DAY 90 capsule 1    EVENING PRIMROSE OIL ORAL Take 1,000 mg by mouth once daily.      furosemide (LASIX) 20 MG tablet Take 1 tablet (20 mg total) by mouth 2 (two) times a day for 5 days, THEN 1 tablet (20 mg total) once daily. Take 1 tablet daily. 370 tablet 0    hydrALAZINE (APRESOLINE) 50 MG tablet TAKE 2 TABLETS  EVERY 8  HOURS. 540 tablet 3    losartan (COZAAR) 25 MG tablet Take 1 tablet (25 mg total) by mouth once daily. 90 tablet 3    multivitamin capsule Take 1 capsule by mouth once daily.      ondansetron (ZOFRAN-ODT) 8 MG TbDL Take 1 tablet (8 mg total) by mouth every 8 (eight) hours as needed (nausea). 60 tablet 5    pantoprazole (PROTONIX) 40 MG tablet Take 1 tablet (40 mg total) by mouth once daily. 90 tablet 1    pregabalin (LYRICA) 50 MG capsule Take 1 capsule (50 mg total) by mouth 2 (two) times daily. NOON & NIGHT TIME 180 capsule 1    temazepam (RESTORIL) 30 mg capsule Take 1 at bedtime 90 capsule 1    vitamin E 400 UNIT capsule Take 400 Units by mouth once daily.      zolpidem (AMBIEN) 10 mg Tab TAKE 1 TABLET BY MOUTH ONCE DAILY  IN THE EVENING 90 tablet 1    clindamycin (CLEOCIN) 300 MG capsule Take 1 capsule (300 mg total) by mouth 3 (three) times daily. for 5 days 15 capsule 0     Current Facility-Administered Medications   Medication Dose Route Frequency Provider Last Rate Last Admin    denosumab (PROLIA) injection 60 mg  60 mg Subcutaneous Q6 Months Prasanth Johnson MD         Facility-Administered Medications Ordered in Other Visits   Medication Dose Route Frequency Provider Last Rate Last Admin    lactated ringers infusion   Intravenous Continuous Jennifer Carr MD 10 mL/hr at 11/09/21 0717 Restarted at 11/09/21 0820       Review of patient's allergies indicates:   Allergen Reactions    Lisinopril Swelling and Rash    Augmentin [amoxicillin-pot clavulanate] Diarrhea       Past Medical History:   Diagnosis Date    Abdominal wall hernia     CT Renal 6/11/2018---Small fat containing superior ventral abdominal wall hernia at the epicardial pacing lead site.    Anxiety     Arthritis     ZEN HIPS    Breast cancer in female 08/2021    LEFT BREAST    Cellulitis of axilla, left 12/23/2021    Chronic diastolic heart failure 12/16/2021    Chronic kidney disease     stage 4, GFR 15-29 ml/min    Chronic midline low back pain without sciatica 6/18/2018    Coronary artery disease 1993    heart transplant    Depression     Fibromyalgia     on Lyrica    Heart failure     native heart cardiomyopathy    Heart transplanted 1993    due to cardiomyopathy    History of hyperparathyroidism; Hyperparathyroidism, secondary renal     PT DENIES    Hypertension     Immune disorder     anti rejection meds    Iron deficiency anemia 8/15/2017    Kidney stones     passed per pt    Obesity     Other osteoporosis without current pathological fracture 8/30/2019    Shingles 2003 approx    left leg    Trouble in sleeping     Urinary incontinence        Past Surgical History:   Procedure Laterality Date    BLADDER SURGERY  2015 approx    mesh -  Dr Everett then 2nd reconstructive sx Dr Onofre    BREAST BIOPSY Bilateral     NEGATIVE    BREAST SURGERY Left 9/28/15    Bx - benign    BREAST SURGERY Right 12/2015    Bx benign    CARDIAC PACEMAKER REMOVAL Left 06/26/2014    Pacer defirillator removed. Put in 1993 aat time of heart transplant    CARPAL TUNNEL RELEASE Left 03/03/15    Dr. Hall    COLONOSCOPY N/A 2/25/2021    Procedure: COLONOSCOPY;  Surgeon: Freida Ramirez MD;  Location: [facility] ENDO;  Service: Endoscopy;  Laterality: N/A;    HEART TRANSPLANT  1993    HERNIA REPAIR Right 1971 approx    Inguinal    HYSTERECTOMY  1983    vag hyst /LSO     INCISION AND DRAINAGE OF ABSCESS Left 12/24/2021    Procedure: INCISION AND DRAINAGE, ABSCESS;  Surgeon: Joseph Longo MD;  Location: [facility] OR;  Service: General;  Laterality: Left;    INSERTION OF TUNNELED CENTRAL VENOUS CATHETER (CVC) WITH SUBCUTANEOUS PORT N/A 11/9/2021    Procedure: [id number];  Surgeon: Christoph Douglas MD;  Location: [facility] OR;  Service: General;  Laterality: N/A;    REMOVAL OF VASCULAR ACCESS PORT      SENTINEL LYMPH NODE BIOPSY Left 10/12/2021    Procedure: BIOPSY, LYMPH NODE, SENTINEL;  Surgeon: Christoph Douglas MD;  Location: [facility] OR;  Service: General;  Laterality: Left;    TOE SURGERY         Family History   Problem Relation Age of Onset    Cancer Mother 38        breast    Breast cancer Mother     Heart disease Maternal Grandmother     Cataracts Cousin     Hypertension Son     Diabetes Neg Hx     Stroke Neg Hx     Kidney disease Neg Hx     Asthma Neg Hx     COPD Neg Hx     Melanoma Neg Hx     Hyperlipidemia Neg Hx        Social History     Socioeconomic History    Marital status: Single    Number of children: 2    Highest education level: 11th grade   Occupational History    Occupation: Retired   Tobacco Use    Smoking status: Never Smoker    Smokeless tobacco: Never Used   Substance and Sexual Activity    Alcohol use: Never     Alcohol/week:  0.0 standard drinks    Drug use: No    Sexual activity: Not Currently     Partners: Male     Birth control/protection: See Surgical Hx   Other Topics Concern    Are you pregnant or think you may be? No    Breast-feeding No   Social History Narrative    Single. 2 children , 1  at 30 yoa   strep throat -  pneumonia and renal complications after not completing course of AB. Other child lives in [place], [place]. Has a cousin locally that could help in an emergency. 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 Caffeine intake =- 1 cola a day. No coffee, + occasional tea, avoids caffeine especially at night. Still drives. She does not have a Living Will or Advanced directive.      Social Determinants of Health     Financial Resource Strain: Low Risk     Difficulty of Paying Living Expenses: Not hard at all   Food Insecurity: No Food Insecurity    Worried About Running Out of Food in the Last Year: Never true    Ran Out of Food in the Last Year: Never true   Transportation Needs: No Transportation Needs    Lack of Transportation (Medical): No    Lack of Transportation (Non-Medical): No   Physical Activity: Insufficiently Active    Days of Exercise per Week: 2 days    Minutes of Exercise per Session: 10 min   Stress: No Stress Concern Present    Feeling of Stress : Not at all   Social Connections: Socially Isolated    Frequency of Communication with Friends and Family: Once a week    Frequency of Social Gatherings with Friends and Family: Once a week    Attends Religion Services: More than 4 times per year    Active Member of Clubs or Organizations: No    Attends Club or Organization Meetings: Never    Marital Status: Never    Housing Stability: Low Risk     Unable to Pay for Housing in the Last Year: No    Number of Places Lived in the Last Year: 1    Unstable Housing in the Last Year: No       Vitals:    22 0901   BP: 126/71   Pulse: (!) 112   Temp: 98  °F (36.7 °C)       Physical Exam  Constitutional:       Appearance: She is well-developed.   HENT:      Head: Normocephalic and atraumatic.      Right Ear: External ear normal.      Left Ear: External ear normal.      Mouth/Throat:      Pharynx: No oropharyngeal exudate.   Eyes:      General: No scleral icterus.        Right eye: No discharge.         Left eye: No discharge.      Conjunctiva/sclera: Conjunctivae normal.      Pupils: Pupils are equal, round, and reactive to light.   Neck:      Thyroid: No thyromegaly.   Cardiovascular:      Rate and Rhythm: Normal rate and regular rhythm.      Heart sounds: Normal heart sounds.   Pulmonary:      Effort: Pulmonary effort is normal.      Breath sounds: Normal breath sounds.   Chest:   Breasts:      Right: No inverted nipple, mass, nipple discharge, skin change, tenderness or supraclavicular adenopathy.      Left: No inverted nipple, mass, nipple discharge, skin change, tenderness or supraclavicular adenopathy.         Abdominal:      General: Bowel sounds are normal.      Palpations: Abdomen is soft.   Musculoskeletal:         General: Normal range of motion.      Right shoulder: No crepitus. Normal strength.      Cervical back: Normal range of motion and neck supple.   Lymphadenopathy:      Head:      Right side of head: No submental, submandibular, tonsillar, preauricular, posterior auricular or occipital adenopathy.      Left side of head: No submental, submandibular, tonsillar, preauricular, posterior auricular or occipital adenopathy.      Cervical: No cervical adenopathy.      Right cervical: No superficial or posterior cervical adenopathy.     Left cervical: No superficial or posterior cervical adenopathy.      Upper Body:      Right upper body: No supraclavicular adenopathy.      Left upper body: No supraclavicular adenopathy.   Skin:     General: Skin is warm and dry.      Coloration: Skin is not pale.      Findings: No erythema or rash.   Neurological:       Mental Status: She is alert and oriented to person, place, and time.      Deep Tendon Reflexes: Reflexes are normal and symmetric.   Psychiatric:         Behavior: Behavior normal.         Thought Content: Thought content normal.         Judgment: Judgment normal.         Result:   Mammo Digital Diagnostic Left with Iván  US Breast Left Limited     History:  Patient is 66 y.o. and is seen for diagnostic imaging.     Films Compared:  Compared to: 07/13/2021 Mammo Digital Screening Bilat w/ Iván, 12/22/2017 Mammo Digital Screening Bilat with CAD, 12/15/2015 Mammo Previous, and 09/28/2015 Mammo Previous     Findings:  This procedure was performed using tomosynthesis. Computer-aided detection was utilized in the interpretation of this examination.  Mammo Digital Diagnostic Left with Iván  The left breast has scattered areas of fibroglandular density.     There is an asymmetry seen in the left breast in the middle depth. There are also associated calcifications.   No sonographic correlate to the abnormality.  No concerning axillary nodes.  Additional calcifications are seen just anterior to the asymmetry. If pathology returns malignancy, then this area will need to be excised as well.     Impression:  Left  Asymmetry: Left breast asymmetry at the middle position with associated calcifications. Assessment: 4 - Suspicious finding. Stereotactic Biopsy is recommended.      BI-RADS Category:   Overall: 4 - Suspicious     Recommendation:  Stereotactic Biopsy is recommended.        Your estimated lifetime risk of breast cancer (to age 85) based on Tyrer-Cuzick risk assessment model is Tyrer-Cuzick: 8.61 %. According to the American Cancer Society, patients with a lifetime breast cancer risk of 20% or higher might benefit from supplemental screening tests.          Final Pathologic Diagnosis 1.  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),  "cribriform and solid patterns with central   necrosis   The DCIS measures approximately 4 cm in size, present in 10/37 blocks   The microinvasive carcinoma measures 2 mm to the closest posterior surgical   margin, 18 mm to the closest superior surgical margin, 21 mm to the closest   anterior surgical margin, and at least 10 mm from the remainder of the   surgical margins   DCIS measures 2 mm to the closest posterior surgical margin, 12 mm to closest   superior surgical margin, 14 mm to the closest anterior surgical margin, and   at least 10 mm from the remainder of the surgical margins   Please see complete surgical pathology cancer case summary below   PATHOLOGIC TNM STAGING: (m)pTmi   Selected slides have been reviewed by Dr. Yana Paulino MD, who concurs   with the diagnosis.   2.  Superior, deep, and lateral margins, excision:   Fibroadipose tissue   Negative for malignancy   Surgical Pathology Cancer Case Summary   Procedure - Excision (less than total mastectomy)   Specimen laterality - left   Histologic type - micro-invasive carcinoma   Histologic grade - Not applicable (microinvasive only)   Tumor size - Microinvasion only (less than or equal to 1 mm)   Tumor focality - multifocal   Ductal carcinoma in situ - present   Positive for extensive intraductal component (EIC)   Size (extent) of DCIS - approximately 4 cm   Number of blocks with DCIS: 10   Number of blocks examined: 43   Architectural patterns - solid, cribriform   Nuclear grade - Grade III (high)   Necrosis: Present, central (expansive "comedo" necrosis)   Microcalcifications - present in DCIS and non-neoplastic tissue   Treatment effect in the breast - No known presurgical therapy   Margins   Margin status for invasive carcinoma   All margins negative for invasive carcinoma   The microinvasive carcinoma measures 2 mm to the closest posterior surgical   margin, 18 mm to the closest superior surgical margin, 21 mm to the closest   anterior surgical " margin, and at least 10 mm from the remainder of the   surgical margins   Margin status for DCIS   All margins negative for DCIS   DCIS measures 2 mm to the closest posterior surgical margin, 12 mm to closest   superior surgical margin, 14 mm to the closest anterior surgical margin, and   at least 10 mm from the remainder of the surgical margins   Regional lymph nodes   Not applicable (no regional lymph nodes submitted or found)   Pathologic Stage Classification   TNM descriptors   m (multiple foci of invasive carcinoma), approximately 4 foci   pT-pTmi: Tumor less than or equal to 1 mm   pN-not assigned (no nodes submitted or found)   Additional findings - Fibrocystic changes         Final Pathologic Diagnosis 1. Yoder lymph node #1, biopsy:       -  One lymph node, POSITIVE for metastatic carcinoma (1/1)       -  Largest metastatic deposit:  1.5 mm       -  Extracapsular extension:  Not identified       -  Immunohistochemical stain for CAM 5.2, AE1/AE3 and wide spectrum of   keratin are positive in          tumor cells   2. Yoder lymph node #2, biopsy:       -  One lymph node, negative for metastatic carcinoma (0/1)       -  Immunohistochemical stain for CAM 5.2, AE1/AE3 and wide spectrum of   keratin are negative     Result:   Mammo Digital Diagnostic Left  US Breast Left Limited     History:  Patient is 66 y.o. and is seen for diagnostic imaging.     Films Compared:  Compared to: 01/12/2022 US Soft Tissue Axilla, Left, 09/10/2021 Mammo Breast Specimen, 09/03/2021 Mammo Breast RADAR REFLECTOR Loc with Mammo Guidance 1st, Left, 09/03/2021 Mammo Breast RADAR REFLECTOR Loc with Mammo Guidance Ea, Left, 08/11/2021 Mammo Breast Stereotactic Biopsy Left, 07/19/2021 US Breast Left Limited, 07/19/2021 Mammo Digital Diagnostic Left with Iván, 07/13/2021 Mammo Digital Screening Bilat w/ Iván, 12/22/2017 Mammo Digital Screening Bilat with CAD, 12/15/2015 Mammo Previous, and 09/28/2015 Mammo Previous     Findings:    Computer-aided detection was utilized in the interpretation of this examination.  US Breast Left Limited  There is a 41 mm x 36 mm x 33 mm complicated cyst/post-surgical collection without vascularity seen in the left breast at 3 o'clock, 8 cm from the nipple. Please note abscess difficult to exclude as per the clinical setting. There is surrounding edema in the soft tissues.  Recommend short-term follow-up after appropriate therapy as clinically indicated.     Please note that patient reportedly could not tolerate tomosynthesis imaging and therefore only 2D images were acquired. There are new changes with distortion in the upper-outer quadrant and axillary region of the left breast with skin thickening, likely related to recent surgery although attention on subsequent exams can be performed.      Mammo Digital Diagnostic Left  The left breast has scattered areas of fibroglandular density.     There are no corresponding masses seen on this modality.      There are post-surgical findings seen in the left breast. There has been no interval development of a suspicious mass, microcalcification, or architectural distortion.      Impression:  Left  Cyst: Left breast 41 mm x 36 mm x 33 mm cyst at the 3 o'clock position. Assessment: 3 - Probably benign. Short Interval Follow-Up in 3 Months is recommended.      BI-RADS Category:   Overall: 3 - Probably Benign     Recommendation:  Short Interval Follow-Up is recommended in 3 Months.  Routine screening mammogram in 1 year is recommended.    Assessment & Plan:  67 y/o female status post left breast lumpectomy s/p left breast SLNbx  Status post I&D left axilla, status post Port-A-Cath removal      Bedside ultrasound performed noting fluid collection  Ultrasound guided aspiration abscess  Rx clindamycin  Will schedule for interval ultrasound evaluation/IR drainage in approximately 1 week  Follow-up in clinic after repeat evaluation/aspiration    Procedure  Prepped with Betadine,  lidocaine 1% with epi injected/10 cc, ultrasound-guided aspiration of fluid with 10 cc purulent drainage, decreased size of collection bandage applied.  Tolerated procedure well

## 2022-02-14 NOTE — H&P (VIEW-ONLY)
Patient ID: Nadia Damon is a 66 y.o. female.    Chief Complaint: breast cancer    HPI: 65 y/o female status post left breast lumpectomy 09/10/2021 s/p left breast  SLNbx 10/12/21 status post left axillary incision and drainage by Dr. Longo 12/24/2021 status post Port-A-Cath removal presents for left breast pain.  In the interim she has undergone ultrasound showing cystic fluid collection of the left breast.  She reports it is sensitive to touch.  Denies any fevers.    presents for postop. She is doing well with no complaints.    presents to discuss Left breast DCIS Er-/NC-. Recently underwent stereotactic core biopsy following abnormal mammogram. Denies any palpable mass.     Per Kristine [place]:   Patient presents for the evaluation of an abnormal left breast mammogram    Pt heart transplant patient 27 years.     Risk factors identified:     Menarche at 14 y/o  G 2 P 2  First pregnancy at 16  LMP: partial hyst - 1984  Estrogen:none  Radiation to the neck or chest wall- none  Prior breast biopsies or atypical hyperplasia- right breast excisional biopsy- benign- left core biopsy benign in past       FH: mother breast cancer at 40's.     Body mass index is 30.2 kg/m².    Review of Systems   Constitutional: Negative.    HENT: Negative.    Eyes: Negative.    Respiratory: Negative.    Cardiovascular: Negative.    Gastrointestinal: Negative.         No reflux   Endocrine: Negative.    Genitourinary: Negative.    Musculoskeletal: Negative.    Skin: Negative.    Allergic/Immunologic: Negative.    Neurological: Negative.    Hematological: Negative.  Negative for adenopathy.   Psychiatric/Behavioral: Negative.    Breast: Pt denies any breast pain, has left lateral chest wall tenderness after last biopsy, nipple discharge, or palpable mass. No prior trauma or bruising. No breast surgeries or abnormalities.      Current Outpatient Medications   Medication Sig Dispense Refill    acetaminophen (TYLENOL) 325 MG tablet  Take 1 tablet (325 mg total) by mouth every 6 (six) hours as needed for Pain.  0    aspirin (ECOTRIN) 81 MG EC tablet Take 1 tablet (81 mg total) by mouth once daily. 90 tablet 3    biotin 10,000 mcg Cap Take 1 tablet by mouth once daily.      busPIRone (BUSPAR) 10 MG tablet Take 1 tablet (10 mg total) by mouth once daily. 90 tablet 3    calcitonin, salmon, (FORTICAL) 200 unit/actuation nasal spray 1 spray by Nasal route once daily. 3.7 mL 3    carvediloL (COREG) 6.25 MG tablet Take 1 tablet (6.25 mg total) by mouth 2 (two) times daily. 180 tablet 3    cloNIDine (CATAPRES) 0.1 MG tablet Take 1 tablet (0.1 mg total) by mouth nightly as needed (BP >150/95). 90 tablet 3    cycloSPORINE modified, NEORAL, (NEORAL) 25 MG capsule Take 3 capsules (75 mg total) by mouth 2 (two) times daily. 270 capsule 11    DULoxetine (CYMBALTA) 30 MG capsule TAKE 1 CAPSULE EVERY DAY 90 capsule 1    EVENING PRIMROSE OIL ORAL Take 1,000 mg by mouth once daily.      furosemide (LASIX) 20 MG tablet Take 1 tablet (20 mg total) by mouth 2 (two) times a day for 5 days, THEN 1 tablet (20 mg total) once daily. Take 1 tablet daily. 370 tablet 0    hydrALAZINE (APRESOLINE) 50 MG tablet TAKE 2 TABLETS  EVERY 8  HOURS. 540 tablet 3    losartan (COZAAR) 25 MG tablet Take 1 tablet (25 mg total) by mouth once daily. 90 tablet 3    multivitamin capsule Take 1 capsule by mouth once daily.      ondansetron (ZOFRAN-ODT) 8 MG TbDL Take 1 tablet (8 mg total) by mouth every 8 (eight) hours as needed (nausea). 60 tablet 5    pantoprazole (PROTONIX) 40 MG tablet Take 1 tablet (40 mg total) by mouth once daily. 90 tablet 1    pregabalin (LYRICA) 50 MG capsule Take 1 capsule (50 mg total) by mouth 2 (two) times daily. NOON & NIGHT TIME 180 capsule 1    temazepam (RESTORIL) 30 mg capsule Take 1 at bedtime 90 capsule 1    vitamin E 400 UNIT capsule Take 400 Units by mouth once daily.      zolpidem (AMBIEN) 10 mg Tab TAKE 1 TABLET BY MOUTH ONCE DAILY  IN THE EVENING 90 tablet 1    clindamycin (CLEOCIN) 300 MG capsule Take 1 capsule (300 mg total) by mouth 3 (three) times daily. for 5 days 15 capsule 0     Current Facility-Administered Medications   Medication Dose Route Frequency Provider Last Rate Last Admin    denosumab (PROLIA) injection 60 mg  60 mg Subcutaneous Q6 Months Prasanth Johnson MD         Facility-Administered Medications Ordered in Other Visits   Medication Dose Route Frequency Provider Last Rate Last Admin    lactated ringers infusion   Intravenous Continuous Jennifer Carr MD 10 mL/hr at 11/09/21 0717 Restarted at 11/09/21 0820       Review of patient's allergies indicates:   Allergen Reactions    Lisinopril Swelling and Rash    Augmentin [amoxicillin-pot clavulanate] Diarrhea       Past Medical History:   Diagnosis Date    Abdominal wall hernia     CT Renal 6/11/2018---Small fat containing superior ventral abdominal wall hernia at the epicardial pacing lead site.    Anxiety     Arthritis     ZEN HIPS    Breast cancer in female 08/2021    LEFT BREAST    Cellulitis of axilla, left 12/23/2021    Chronic diastolic heart failure 12/16/2021    Chronic kidney disease     stage 4, GFR 15-29 ml/min    Chronic midline low back pain without sciatica 6/18/2018    Coronary artery disease 1993    heart transplant    Depression     Fibromyalgia     on Lyrica    Heart failure     native heart cardiomyopathy    Heart transplanted 1993    due to cardiomyopathy    History of hyperparathyroidism; Hyperparathyroidism, secondary renal     PT DENIES    Hypertension     Immune disorder     anti rejection meds    Iron deficiency anemia 8/15/2017    Kidney stones     passed per pt    Obesity     Other osteoporosis without current pathological fracture 8/30/2019    Shingles 2003 approx    left leg    Trouble in sleeping     Urinary incontinence        Past Surgical History:   Procedure Laterality Date    BLADDER SURGERY  2015 approx    mesh -  Dr Everett then 2nd reconstructive sx Dr Onofre    BREAST BIOPSY Bilateral     NEGATIVE    BREAST SURGERY Left 9/28/15    Bx - benign    BREAST SURGERY Right 12/2015    Bx benign    CARDIAC PACEMAKER REMOVAL Left 06/26/2014    Pacer defirillator removed. Put in 1993 aat time of heart transplant    CARPAL TUNNEL RELEASE Left 03/03/15    Dr. Hall    COLONOSCOPY N/A 2/25/2021    Procedure: COLONOSCOPY;  Surgeon: Freida Ramirez MD;  Location: [facility] END[facility];  Service: Endoscopy;  Laterality: N/A;    HEART TRANSPLANT  1993    HERNIA REPAIR Right 1971 approx    Inguinal    HYSTERECTOMY  1983    vag hyst /LSO     INCISION AND DRAINAGE OF ABSCESS Left 12/24/2021    Procedure: INCISION AND DRAINAGE, ABSCESS;  Surgeon: Joseph Lonog MD;  Location: [facility] OR;  Service: General;  Laterality: Left;    INSERTION OF TUNNELED CENTRAL VENOUS CATHETER (CVC) WITH SUBCUTANEOUS PORT N/A 11/9/2021    Procedure: [id number];  Surgeon: Christoph Douglas MD;  Location: [facility] OR;  Service: General;  Laterality: N/A;    REMOVAL OF VASCULAR ACCESS PORT      SENTINEL LYMPH NODE BIOPSY Left 10/12/2021    Procedure: BIOPSY, LYMPH NODE, SENTINEL;  Surgeon: Christoph Douglas MD;  Location: [facility] OR;  Service: General;  Laterality: Left;    TOE SURGERY         Family History   Problem Relation Age of Onset    Cancer Mother 38        breast    Breast cancer Mother     Heart disease Maternal Grandmother     Cataracts Cousin     Hypertension Son     Diabetes Neg Hx     Stroke Neg Hx     Kidney disease Neg Hx     Asthma Neg Hx     COPD Neg Hx     Melanoma Neg Hx     Hyperlipidemia Neg Hx        Social History     Socioeconomic History    Marital status: Single    Number of children: 2    Highest education level: 11th grade   Occupational History    Occupation: Retired   Tobacco Use    Smoking status: Never Smoker    Smokeless tobacco: Never Used   Substance and Sexual Activity    Alcohol use: Never     Alcohol/week:  0.0 standard drinks    Drug use: No    Sexual activity: Not Currently     Partners: Male     Birth control/protection: See Surgical Hx   Other Topics Concern    Are you pregnant or think you may be? No    Breast-feeding No   Social History Narrative    Single. 2 children , 1  at 30 yoa   strep throat -  pneumonia and renal complications after not completing course of AB. Other child lives in [place], [place]. Has a cousin locally that could help in an emergency. 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 Caffeine intake =- 1 cola a day. No coffee, + occasional tea, avoids caffeine especially at night. Still drives. She does not have a Living Will or Advanced directive.      Social Determinants of Health     Financial Resource Strain: Low Risk     Difficulty of Paying Living Expenses: Not hard at all   Food Insecurity: No Food Insecurity    Worried About Running Out of Food in the Last Year: Never true    Ran Out of Food in the Last Year: Never true   Transportation Needs: No Transportation Needs    Lack of Transportation (Medical): No    Lack of Transportation (Non-Medical): No   Physical Activity: Insufficiently Active    Days of Exercise per Week: 2 days    Minutes of Exercise per Session: 10 min   Stress: No Stress Concern Present    Feeling of Stress : Not at all   Social Connections: Socially Isolated    Frequency of Communication with Friends and Family: Once a week    Frequency of Social Gatherings with Friends and Family: Once a week    Attends [employer]ism Services: More than 4 times per year    Active Member of Clubs or Organizations: No    Attends Club or Organization Meetings: Never    Marital Status: Never    Housing Stability: Low Risk     Unable to Pay for Housing in the Last Year: No    Number of Places Lived in the Last Year: 1    Unstable Housing in the Last Year: No       Vitals:    22 0901   BP: 126/71   Pulse: (!) 112   Temp: 98  °F (36.7 °C)       Physical Exam  Constitutional:       Appearance: She is well-developed.   HENT:      Head: Normocephalic and atraumatic.      Right Ear: External ear normal.      Left Ear: External ear normal.      Mouth/Throat:      Pharynx: No oropharyngeal exudate.   Eyes:      General: No scleral icterus.        Right eye: No discharge.         Left eye: No discharge.      Conjunctiva/sclera: Conjunctivae normal.      Pupils: Pupils are equal, round, and reactive to light.   Neck:      Thyroid: No thyromegaly.   Cardiovascular:      Rate and Rhythm: Normal rate and regular rhythm.      Heart sounds: Normal heart sounds.   Pulmonary:      Effort: Pulmonary effort is normal.      Breath sounds: Normal breath sounds.   Chest:   Breasts:      Right: No inverted nipple, mass, nipple discharge, skin change, tenderness or supraclavicular adenopathy.      Left: No inverted nipple, mass, nipple discharge, skin change, tenderness or supraclavicular adenopathy.         Abdominal:      General: Bowel sounds are normal.      Palpations: Abdomen is soft.   Musculoskeletal:         General: Normal range of motion.      Right shoulder: No crepitus. Normal strength.      Cervical back: Normal range of motion and neck supple.   Lymphadenopathy:      Head:      Right side of head: No submental, submandibular, tonsillar, preauricular, posterior auricular or occipital adenopathy.      Left side of head: No submental, submandibular, tonsillar, preauricular, posterior auricular or occipital adenopathy.      Cervical: No cervical adenopathy.      Right cervical: No superficial or posterior cervical adenopathy.     Left cervical: No superficial or posterior cervical adenopathy.      Upper Body:      Right upper body: No supraclavicular adenopathy.      Left upper body: No supraclavicular adenopathy.   Skin:     General: Skin is warm and dry.      Coloration: Skin is not pale.      Findings: No erythema or rash.   Neurological:       Mental Status: She is alert and oriented to person, place, and time.      Deep Tendon Reflexes: Reflexes are normal and symmetric.   Psychiatric:         Behavior: Behavior normal.         Thought Content: Thought content normal.         Judgment: Judgment normal.         Result:   Mammo Digital Diagnostic Left with Iván  US Breast Left Limited     History:  Patient is 66 y.o. and is seen for diagnostic imaging.     Films Compared:  Compared to: 07/13/2021 Mammo Digital Screening Bilat w/ Iván, 12/22/2017 Mammo Digital Screening Bilat with CAD, 12/15/2015 Mammo Previous, and 09/28/2015 Mammo Previous     Findings:  This procedure was performed using tomosynthesis. Computer-aided detection was utilized in the interpretation of this examination.  Mammo Digital Diagnostic Left with Iván  The left breast has scattered areas of fibroglandular density.     There is an asymmetry seen in the left breast in the middle depth. There are also associated calcifications.   No sonographic correlate to the abnormality.  No concerning axillary nodes.  Additional calcifications are seen just anterior to the asymmetry. If pathology returns malignancy, then this area will need to be excised as well.     Impression:  Left  Asymmetry: Left breast asymmetry at the middle position with associated calcifications. Assessment: 4 - Suspicious finding. Stereotactic Biopsy is recommended.      BI-RADS Category:   Overall: 4 - Suspicious     Recommendation:  Stereotactic Biopsy is recommended.        Your estimated lifetime risk of breast cancer (to age 85) based on Tyrer-Cuzick risk assessment model is Tyrer-Cuzick: 8.61 %. According to the American Cancer Society, patients with a lifetime breast cancer risk of 20% or higher might benefit from supplemental screening tests.          Final Pathologic Diagnosis 1.  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),  "cribriform and solid patterns with central   necrosis   The DCIS measures approximately 4 cm in size, present in 10/37 blocks   The microinvasive carcinoma measures 2 mm to the closest posterior surgical   margin, 18 mm to the closest superior surgical margin, 21 mm to the closest   anterior surgical margin, and at least 10 mm from the remainder of the   surgical margins   DCIS measures 2 mm to the closest posterior surgical margin, 12 mm to closest   superior surgical margin, 14 mm to the closest anterior surgical margin, and   at least 10 mm from the remainder of the surgical margins   Please see complete surgical pathology cancer case summary below   PATHOLOGIC TNM STAGING: (m)pTmi   Selected slides have been reviewed by Dr. Yana Paulino MD, who concurs   with the diagnosis.   2.  Superior, deep, and lateral margins, excision:   Fibroadipose tissue   Negative for malignancy   Surgical Pathology Cancer Case Summary   Procedure - Excision (less than total mastectomy)   Specimen laterality - left   Histologic type - micro-invasive carcinoma   Histologic grade - Not applicable (microinvasive only)   Tumor size - Microinvasion only (less than or equal to 1 mm)   Tumor focality - multifocal   Ductal carcinoma in situ - present   Positive for extensive intraductal component (EIC)   Size (extent) of DCIS - approximately 4 cm   Number of blocks with DCIS: 10   Number of blocks examined: 43   Architectural patterns - solid, cribriform   Nuclear grade - Grade III (high)   Necrosis: Present, central (expansive "comedo" necrosis)   Microcalcifications - present in DCIS and non-neoplastic tissue   Treatment effect in the breast - No known presurgical therapy   Margins   Margin status for invasive carcinoma   All margins negative for invasive carcinoma   The microinvasive carcinoma measures 2 mm to the closest posterior surgical   margin, 18 mm to the closest superior surgical margin, 21 mm to the closest   anterior surgical " margin, and at least 10 mm from the remainder of the   surgical margins   Margin status for DCIS   All margins negative for DCIS   DCIS measures 2 mm to the closest posterior surgical margin, 12 mm to closest   superior surgical margin, 14 mm to the closest anterior surgical margin, and   at least 10 mm from the remainder of the surgical margins   Regional lymph nodes   Not applicable (no regional lymph nodes submitted or found)   Pathologic Stage Classification   TNM descriptors   m (multiple foci of invasive carcinoma), approximately 4 foci   pT-pTmi: Tumor less than or equal to 1 mm   pN-not assigned (no nodes submitted or found)   Additional findings - Fibrocystic changes         Final Pathologic Diagnosis 1. [place] lymph node #1, biopsy:       -  One lymph node, POSITIVE for metastatic carcinoma (1/1)       -  Largest metastatic deposit:  1.5 mm       -  Extracapsular extension:  Not identified       -  Immunohistochemical stain for CAM 5.2, AE1/AE3 and wide spectrum of   keratin are positive in          tumor cells   2. [place] lymph node #2, biopsy:       -  One lymph node, negative for metastatic carcinoma (0/1)       -  Immunohistochemical stain for CAM 5.2, AE1/AE3 and wide spectrum of   keratin are negative     Result:   Mammo Digital Diagnostic Left  US Breast Left Limited     History:  Patient is 66 y.o. and is seen for diagnostic imaging.     Films Compared:  Compared to: 01/12/2022 US Soft Tissue Axilla, Left, 09/10/2021 Mammo Breast Specimen, 09/03/2021 Mammo Breast RADAR REFLECTOR Loc with Mammo Guidance 1st, Left, 09/03/2021 Mammo Breast RADAR REFLECTOR Loc with Mammo Guidance Ea, Left, 08/11/2021 Mammo Breast Stereotactic Biopsy Left, 07/19/2021 US Breast Left Limited, 07/19/2021 Mammo Digital Diagnostic Left with Iván, 07/13/2021 Mammo Digital Screening Bilat w/ Iván, 12/22/2017 Mammo Digital Screening Bilat with CAD, 12/15/2015 Mammo Previous, and 09/28/2015 Mammo Previous     Findings:    Computer-aided detection was utilized in the interpretation of this examination.  US Breast Left Limited  There is a 41 mm x 36 mm x 33 mm complicated cyst/post-surgical collection without vascularity seen in the left breast at 3 o'clock, 8 cm from the nipple. Please note abscess difficult to exclude as per the clinical setting. There is surrounding edema in the soft tissues.  Recommend short-term follow-up after appropriate therapy as clinically indicated.     Please note that patient reportedly could not tolerate tomosynthesis imaging and therefore only 2D images were acquired. There are new changes with distortion in the upper-outer quadrant and axillary region of the left breast with skin thickening, likely related to recent surgery although attention on subsequent exams can be performed.      Mammo Digital Diagnostic Left  The left breast has scattered areas of fibroglandular density.     There are no corresponding masses seen on this modality.      There are post-surgical findings seen in the left breast. There has been no interval development of a suspicious mass, microcalcification, or architectural distortion.      Impression:  Left  Cyst: Left breast 41 mm x 36 mm x 33 mm cyst at the 3 o'clock position. Assessment: 3 - Probably benign. Short Interval Follow-Up in 3 Months is recommended.      BI-RADS Category:   Overall: 3 - Probably Benign     Recommendation:  Short Interval Follow-Up is recommended in 3 Months.  Routine screening mammogram in 1 year is recommended.    Assessment & Plan:  65 y/o female status post left breast lumpectomy s/p left breast SLNbx  Status post I&D left axilla, status post Port-A-Cath removal      Bedside ultrasound performed noting fluid collection  Ultrasound guided aspiration abscess  Rx clindamycin  Will schedule for interval ultrasound evaluation/IR drainage in approximately 1 week  Follow-up in clinic after repeat evaluation/aspiration    Procedure  Prepped with Betadine,  lidocaine 1% with epi injected/10 cc, ultrasound-guided aspiration of fluid with 10 cc purulent drainage, decreased size of collection bandage applied.  Tolerated procedure well

## 2022-02-15 ENCOUNTER — HOSPITAL ENCOUNTER (OUTPATIENT)
Dept: RADIOLOGY | Facility: HOSPITAL | Age: 67
Discharge: HOME | End: 2022-02-15
Attending: SURGERY
Payer: MEDICARE

## 2022-02-16 NOTE — PROGRESS NOTES
Outpatient Care Management  Plan of Care Follow Up Visit    Patient: Nadia Damon  MRN: [id number]  Date of Service: 02/08/2022  Completed by: Ray Baker RN  Referral Date: 12/03/2021  Program: Case Management (High Risk)    Reason for Visit   Patient presents with    OPCM Chart Review     Ms. Damon has several apts today, will reschedule follow up call    Update Plan Of Care       Brief Summary: Phone contact made with Ms. Damon today and we were able to close her depression care plan. She voiced that she has available resources at home and is doing well. Contact information given to Ms. Damon for myself if she needs anything in the future. Will close case today due to program graduation.     Patient Summary     Involvement of Care:  Do I have permission to speak with other family members about your care?   no    Patient Reported Labs & Vitals:  1.  Any Patient Reported Labs & Vitals?   no  2.  Patient Reported Blood Pressure:     3.  Patient Reported Pulse:     4.  Patient Reported Weight (Kg):     5.  Patient Reported Blood Glucose (mg/dl):       Medical and social history was reviewed with patient and/or caregiver.     Clinical Assessment     Reviewed and provided basic information on available community resources for mental health, transportation, wellness resources, and palliative care programs with patient and/or caregiver.     Complex Care Plan     Care plan was discussed and completed today with input from patient and/or caregiver.    Patient Instructions     Instructions were provided via the [employer] patient resources and are available for the patient to view on the patient portal.    Next Steps: Close case due to program completion Ray Baker RN         Todays OPCM Self-Management Care Plan was developed with the patients/caregivers input and was based on identified barriers from todays assessment.  Goals were written today with the patient/caregiver and the patient has agreed to  work towards these goals to improve his/her overall well-being. Patient verbalized understanding of the care plan, goals, and all of today's instructions. Encouraged patient/caregiver to communicate with his/her physician and health care team about health conditions and the treatment plan.  Provided my contact information today and encouraged patient/caregiver to call me with any questions as needed.

## 2022-02-21 ENCOUNTER — HOSPITAL ENCOUNTER (OUTPATIENT)
Dept: RADIOLOGY | Facility: HOSPITAL | Age: 67
Discharge: HOME | End: 2022-02-21
Attending: SURGERY
Payer: MEDICARE

## 2022-02-21 DIAGNOSIS — N61.1 BREAST ABSCESS: ICD-10-CM

## 2022-02-21 PROCEDURE — 19000 PUNCTURE ASPIR CYST BREAST: CPT | Mod: LT

## 2022-02-21 PROCEDURE — 75989 ABSCESS DRAINAGE UNDER X-RAY: CPT | Mod: TC,HCNC

## 2022-02-21 NOTE — DISCHARGE SUMMARY
Pre Op Diagnosis: left chest fluid collection     Post Op Diagnosis: same     Procedure:  drain     Procedure performed by: Wendy MONTERROSO, Carlos MARTINO     Written Informed Consent Obtained: Yes     Specimen Removed:  yes     Estimated Blood Loss:  minimal     Findings: Local anesthesia and moderate sedation were used.     The patient tolerated the procedure well and there were no complications.      Sterile technique was performed in the anterior left chest, lidocaine was used as a local anesthetic.  Approx 3 ccs of serosanguinous fluid.  Pt tolerated the procedure well without immediate complications.  Please see radiologist report for details. F/u with PCP and/or ordering physician.

## 2022-02-22 DIAGNOSIS — D84.9 IMMUNOSUPPRESSED STATUS: ICD-10-CM

## 2022-02-23 ENCOUNTER — INFUSION (OUTPATIENT)
Dept: INFUSION THERAPY | Facility: HOSPITAL | Age: 67
End: 2022-02-23
Attending: NURSE PRACTITIONER
Payer: MEDICARE

## 2022-02-23 VITALS — OXYGEN SATURATION: 98 % | TEMPERATURE: 99 F | RESPIRATION RATE: 18 BRPM | HEART RATE: 99 BPM

## 2022-02-23 DIAGNOSIS — M81.8 OTHER OSTEOPOROSIS WITHOUT CURRENT PATHOLOGICAL FRACTURE: ICD-10-CM

## 2022-02-23 DIAGNOSIS — N18.4 ANEMIA ASSOCIATED WITH STAGE 4 CHRONIC RENAL FAILURE: Primary | ICD-10-CM

## 2022-02-23 DIAGNOSIS — D63.1 ANEMIA ASSOCIATED WITH STAGE 4 CHRONIC RENAL FAILURE: Primary | ICD-10-CM

## 2022-02-23 PROCEDURE — 25000003 PHARM REV CODE 250: Mod: HCNC | Performed by: NURSE PRACTITIONER

## 2022-02-23 PROCEDURE — 96372 THER/PROPH/DIAG INJ SC/IM: CPT | Mod: HCNC

## 2022-02-23 PROCEDURE — 63600175 PHARM REV CODE 636 W HCPCS: Mod: JG,HCNC | Performed by: NURSE PRACTITIONER

## 2022-02-23 RX ORDER — ACETAMINOPHEN 325 MG/1
650 TABLET ORAL
Status: CANCELLED
Start: 2022-03-02

## 2022-02-23 RX ORDER — ACETAMINOPHEN 325 MG/1
650 TABLET ORAL
Status: COMPLETED | OUTPATIENT
Start: 2022-02-23 | End: 2022-02-23

## 2022-02-23 RX ADMIN — ACETAMINOPHEN 650 MG: 325 TABLET ORAL at 01:02

## 2022-02-23 RX ADMIN — EPOETIN ALFA-EPBX 20000 UNITS: 20000 INJECTION, SOLUTION INTRAVENOUS; SUBCUTANEOUS at 01:02

## 2022-02-28 ENCOUNTER — OFFICE VISIT (OUTPATIENT)
Dept: SURGERY | Facility: CLINIC | Age: 67
End: 2022-02-28
Payer: MEDICARE

## 2022-02-28 VITALS
TEMPERATURE: 97 F | DIASTOLIC BLOOD PRESSURE: 92 MMHG | SYSTOLIC BLOOD PRESSURE: 147 MMHG | BODY MASS INDEX: 30.4 KG/M2 | HEART RATE: 103 BPM | WEIGHT: 166.25 LBS

## 2022-02-28 DIAGNOSIS — N61.1 BREAST ABSCESS: Primary | ICD-10-CM

## 2022-02-28 DIAGNOSIS — C50.912 DUCTAL CARCINOMA IN SITU OF BREAST WITH MICROINVASIVE COMPONENT, LEFT: ICD-10-CM

## 2022-02-28 PROCEDURE — 99212 PR OFFICE/OUTPT VISIT, EST, LEVL II, 10-19 MIN: ICD-10-PCS | Mod: HCNC,S$GLB,, | Performed by: SURGERY

## 2022-02-28 PROCEDURE — 1125F PR PAIN SEVERITY QUANTIFIED, PAIN PRESENT: ICD-10-PCS | Mod: HCNC,CPTII,S$GLB, | Performed by: SURGERY

## 2022-02-28 PROCEDURE — 1159F PR MEDICATION LIST DOCUMENTED IN MEDICAL RECORD: ICD-10-PCS | Mod: HCNC,CPTII,S$GLB, | Performed by: SURGERY

## 2022-02-28 PROCEDURE — 3077F PR MOST RECENT SYSTOLIC BLOOD PRESSURE >= 140 MM HG: ICD-10-PCS | Mod: HCNC,CPTII,S$GLB, | Performed by: SURGERY

## 2022-02-28 PROCEDURE — 1160F PR REVIEW ALL MEDS BY PRESCRIBER/CLIN PHARMACIST DOCUMENTED: ICD-10-PCS | Mod: HCNC,CPTII,S$GLB, | Performed by: SURGERY

## 2022-02-28 PROCEDURE — 1157F ADVNC CARE PLAN IN RCRD: CPT | Mod: HCNC,CPTII,S$GLB, | Performed by: SURGERY

## 2022-02-28 PROCEDURE — 1125F AMNT PAIN NOTED PAIN PRSNT: CPT | Mod: HCNC,CPTII,S$GLB, | Performed by: SURGERY

## 2022-02-28 PROCEDURE — 99212 OFFICE O/P EST SF 10 MIN: CPT | Mod: HCNC,S$GLB,, | Performed by: SURGERY

## 2022-02-28 PROCEDURE — 3080F DIAST BP >= 90 MM HG: CPT | Mod: HCNC,CPTII,S$GLB, | Performed by: SURGERY

## 2022-02-28 PROCEDURE — 1159F MED LIST DOCD IN RCRD: CPT | Mod: HCNC,CPTII,S$GLB, | Performed by: SURGERY

## 2022-02-28 PROCEDURE — 1160F RVW MEDS BY RX/DR IN RCRD: CPT | Mod: HCNC,CPTII,S$GLB, | Performed by: SURGERY

## 2022-02-28 PROCEDURE — 3066F NEPHROPATHY DOC TX: CPT | Mod: HCNC,CPTII,S$GLB, | Performed by: SURGERY

## 2022-02-28 PROCEDURE — 99999 PR PBB SHADOW E&M-EST. PATIENT-LVL IV: CPT | Mod: PBBFAC,HCNC,, | Performed by: SURGERY

## 2022-02-28 PROCEDURE — 99999 PR PBB SHADOW E&M-EST. PATIENT-LVL IV: ICD-10-PCS | Mod: PBBFAC,HCNC,, | Performed by: SURGERY

## 2022-02-28 PROCEDURE — 3008F BODY MASS INDEX DOCD: CPT | Mod: HCNC,CPTII,S$GLB, | Performed by: SURGERY

## 2022-02-28 PROCEDURE — 3066F PR DOCUMENTATION OF TREATMENT FOR NEPHROPATHY: ICD-10-PCS | Mod: HCNC,CPTII,S$GLB, | Performed by: SURGERY

## 2022-02-28 PROCEDURE — 3008F PR BODY MASS INDEX (BMI) DOCUMENTED: ICD-10-PCS | Mod: HCNC,CPTII,S$GLB, | Performed by: SURGERY

## 2022-02-28 PROCEDURE — 1157F PR ADVANCE CARE PLAN OR EQUIV PRESENT IN MEDICAL RECORD: ICD-10-PCS | Mod: HCNC,CPTII,S$GLB, | Performed by: SURGERY

## 2022-02-28 PROCEDURE — 3077F SYST BP >= 140 MM HG: CPT | Mod: HCNC,CPTII,S$GLB, | Performed by: SURGERY

## 2022-02-28 PROCEDURE — 3080F PR MOST RECENT DIASTOLIC BLOOD PRESSURE >= 90 MM HG: ICD-10-PCS | Mod: HCNC,CPTII,S$GLB, | Performed by: SURGERY

## 2022-02-28 NOTE — PROGRESS NOTES
General Surgery Clinic  Follow-Up    Patient Name: Nadia Damon  YOB: 1955 (66 y.o.)  MRN: [id number]  Today's Date: 02/28/2022    Referring Md:   No referring provider defined for this encounter.    SUBJECTIVE:     Chief Complaint: F/u s/p IR drainage of breast abscess    History of Present Illness:  Nadia Damon is a 66 y.o. female with PMHx of heart transplant, breast cancer, and breast abscess who presents to the clinic today for f/u s/p IR drainage of her left breast abscess. Recent IR US guided aspiration. IR was only able to obtain about 3ml from the abscess reported very minimal residual fluid. Patient states that she finished her course of abx. She reports that she feels as if the abscess is improving, and minimal discomfort. Denies fevers, abdominal pain, N/V, or any other issues.       Review of patient's allergies indicates:   Allergen Reactions    Lisinopril Swelling and Rash    Augmentin [amoxicillin-pot clavulanate] Diarrhea       Past Medical History:   Diagnosis Date    Abdominal wall hernia     CT Renal 6/11/2018---Small fat containing superior ventral abdominal wall hernia at the epicardial pacing lead site.    Anxiety     Arthritis     ZEN HIPS    Breast cancer in female 08/2021    LEFT BREAST    Cellulitis of axilla, left 12/23/2021    Chronic diastolic heart failure 12/16/2021    Chronic kidney disease     stage 4, GFR 15-29 ml/min    Chronic midline low back pain without sciatica 6/18/2018    Coronary artery disease 1993    heart transplant    Depression     Fibromyalgia     on Lyrica    Heart failure     native heart cardiomyopathy    Heart transplanted 1993    due to cardiomyopathy    History of hyperparathyroidism; Hyperparathyroidism, secondary renal     PT DENIES    Hypertension     Immune disorder     anti rejection meds    Iron deficiency anemia 8/15/2017    Kidney stones     passed per pt    Obesity     Other osteoporosis without current  pathological fracture 8/30/2019    Shingles 2003 approx    left leg    Trouble in sleeping     Urinary incontinence      Past Surgical History:   Procedure Laterality Date    BLADDER SURGERY  2015 approx    mesh - Dr Everett then 2nd reconstructive sx Dr Onofre    BREAST BIOPSY Bilateral     NEGATIVE    BREAST SURGERY Left 9/28/15    Bx - benign    BREAST SURGERY Right 12/2015    Bx benign    CARDIAC PACEMAKER REMOVAL Left 06/26/2014    Pacer defirillator removed. Put in 1993 aat time of heart transplant    CARPAL TUNNEL RELEASE Left 03/03/15    Dr. Hall    COLONOSCOPY N/A 2/25/2021    Procedure: COLONOSCOPY;  Surgeon: Freida Ramirez MD;  Location: [facility] ENDO;  Service: Endoscopy;  Laterality: N/A;    HEART TRANSPLANT  1993    HERNIA REPAIR Right 1971 approx    Inguinal    HYSTERECTOMY  1983    vag hyst /LSO     INCISION AND DRAINAGE OF ABSCESS Left 12/24/2021    Procedure: INCISION AND DRAINAGE, ABSCESS;  Surgeon: Joseph Longo MD;  Location: [facility] OR;  Service: General;  Laterality: Left;    INSERTION OF TUNNELED CENTRAL VENOUS CATHETER (CVC) WITH SUBCUTANEOUS PORT N/A 11/9/2021    Procedure: [id number];  Surgeon: Christoph Douglas MD;  Location: [facility] OR;  Service: General;  Laterality: N/A;    REMOVAL OF VASCULAR ACCESS PORT      SENTINEL LYMPH NODE BIOPSY Left 10/12/2021    Procedure: BIOPSY, LYMPH NODE, SENTINEL;  Surgeon: Christoph Douglas MD;  Location: [facility] OR;  Service: General;  Laterality: Left;    TOE SURGERY       Family History   Problem Relation Age of Onset    Cancer Mother 38        breast    Breast cancer Mother     Heart disease Maternal Grandmother     Cataracts Cousin     Hypertension Son     Diabetes Neg Hx     Stroke Neg Hx     Kidney disease Neg Hx     Asthma Neg Hx     COPD Neg Hx     Melanoma Neg Hx     Hyperlipidemia Neg Hx      Social History     Tobacco Use    Smoking status: Never Smoker    Smokeless tobacco: Never Used   Substance Use Topics     Alcohol use: Never     Alcohol/week: 0.0 standard drinks    Drug use: No        Review of Systems:  Review of Systems   Constitutional: Negative for chills and fever.   HENT: Negative for congestion and sore throat.    Respiratory: Negative for cough and shortness of breath.    Cardiovascular: Negative for chest pain and leg swelling.   Gastrointestinal: Negative for abdominal pain, nausea and vomiting.   Genitourinary: Negative for dysuria.   Musculoskeletal: Negative for myalgias.   Skin: Negative for rash.   Neurological: Negative for weakness and headaches.       OBJECTIVE:     Vital Signs (Most Recent)  BP (!) 147/92   Pulse 103   Temp 97.1 °F (36.2 °C) (Tympanic)   Wt 75.4 kg (166 lb 3.6 oz)   LMP 06/20/1983 (Within Years)   BMI 30.40 kg/m²     Physical Exam  Constitutional:       Appearance: She is well-developed.   HENT:      Head: Normocephalic and atraumatic.      Right Ear: External ear normal.      Left Ear: External ear normal.      Mouth/Throat:      Pharynx: No oropharyngeal exudate.   Eyes:      General: No scleral icterus.        Right eye: No discharge.         Left eye: No discharge.      Conjunctiva/sclera: Conjunctivae normal.      Pupils: Pupils are equal, round, and reactive to light.   Neck:      Thyroid: No thyromegaly.   Cardiovascular:      Rate and Rhythm: Normal rate.   Pulmonary:      Effort: Pulmonary effort is normal.   Chest:   Breasts:      Right: No inverted nipple, mass, nipple discharge, skin change, tenderness or supraclavicular adenopathy.      Left: No inverted nipple, mass, nipple discharge, skin change, tenderness or supraclavicular adenopathy.         Abdominal:      Palpations: Abdomen is soft.   Musculoskeletal:         General: Normal range of motion.      Right shoulder: No crepitus. Normal strength.      Cervical back: Normal range of motion and neck supple.   Lymphadenopathy:      Head:      Right side of head: No submental, submandibular, tonsillar,  preauricular, posterior auricular or occipital adenopathy.      Left side of head: No submental, submandibular, tonsillar, preauricular, posterior auricular or occipital adenopathy.      Cervical: No cervical adenopathy.      Right cervical: No superficial or posterior cervical adenopathy.     Left cervical: No superficial or posterior cervical adenopathy.      Upper Body:      Right upper body: No supraclavicular adenopathy.      Left upper body: No supraclavicular adenopathy.   Skin:     General: Skin is warm and dry.      Coloration: Skin is not pale.      Findings: No erythema or rash.   Neurological:      Mental Status: She is alert and oriented to person, place, and time.      Deep Tendon Reflexes: Reflexes are normal and symmetric.   Psychiatric:         Behavior: Behavior normal.         Thought Content: Thought content normal.         Judgment: Judgment normal.         Imaging: EXAMINATION:  US GUIDED ABSCESS CYST ASPIRATION     CLINICAL HISTORY:  breast abscess;  Abscess of the breast and nipple     TECHNIQUE:  Real-time ultrasonography was utilized to perform a fine needle aspiration.  Grayscale and color Doppler spot images were obtained.     COMPARISON:  02/09/2022     FINDINGS:  The patient was informed of the risks, benefits, and alternatives to the procedure and had  questions answered. The patient demonstrated verbal understanding and signed the informed consent.     The patient was then prepped and draped in a sterile fashion and local anesthesia was achieved via a 1% lidocaine solution.     Using sonographic guidance, a 18-gauge needle was then inserted into the left BREAST collection and a sample was returned. Only 2-3 cc of serosanguineous material was yielded.  No allegra pus.  The patient tolerated the procedure without an immediate complication.     Impression:     Ultrasound guided fine-needle aspiration of a left breast collection yielding only minimal serosanguineous fluid and no allegra  pus.        Electronically signed by: Bruno Nguyen MD  Date:                                            02/21/2022  Time:                                           12:11    ASSESSMENT/PLAN:     Nadia Damon is a 66 y.o. female status post left breast lumpectomy/sentinel lymph node biopsy    Pain and swelling left breast and improving  She has completed antibiotic therapy  Continue to monitor for now for recurrence of symptoms  Keep scheduled follow-up

## 2022-03-08 ENCOUNTER — OFFICE VISIT (OUTPATIENT)
Dept: PRIMARY CARE CLINIC | Facility: CLINIC | Age: 67
End: 2022-03-08
Payer: MEDICARE

## 2022-03-08 ENCOUNTER — LAB VISIT (OUTPATIENT)
Dept: LAB | Facility: HOSPITAL | Age: 67
End: 2022-03-08
Attending: INTERNAL MEDICINE
Payer: MEDICARE

## 2022-03-08 VITALS
HEIGHT: 62 IN | RESPIRATION RATE: 16 BRPM | WEIGHT: 165.13 LBS | BODY MASS INDEX: 30.39 KG/M2 | HEART RATE: 98 BPM | OXYGEN SATURATION: 98 % | DIASTOLIC BLOOD PRESSURE: 64 MMHG | SYSTOLIC BLOOD PRESSURE: 130 MMHG | TEMPERATURE: 96 F

## 2022-03-08 DIAGNOSIS — M79.605 PAIN IN BOTH LOWER EXTREMITIES: Primary | ICD-10-CM

## 2022-03-08 DIAGNOSIS — M79.604 PAIN IN BOTH LOWER EXTREMITIES: Primary | ICD-10-CM

## 2022-03-08 DIAGNOSIS — D05.12 DUCTAL CARCINOMA IN SITU (DCIS) OF LEFT BREAST: ICD-10-CM

## 2022-03-08 LAB
BASOPHILS # BLD AUTO: 0.02 K/UL (ref 0–0.2)
BASOPHILS NFR BLD: 0.6 % (ref 0–1.9)
DIFFERENTIAL METHOD: ABNORMAL
EOSINOPHIL # BLD AUTO: 0.2 K/UL (ref 0–0.5)
EOSINOPHIL NFR BLD: 5.4 % (ref 0–8)
ERYTHROCYTE [DISTWIDTH] IN BLOOD BY AUTOMATED COUNT: 16.2 % (ref 11.5–14.5)
HCT VFR BLD AUTO: 32.2 % (ref 37–48.5)
HGB BLD-MCNC: 10.2 G/DL (ref 12–16)
IMM GRANULOCYTES # BLD AUTO: 0.02 K/UL (ref 0–0.04)
IMM GRANULOCYTES NFR BLD AUTO: 0.6 % (ref 0–0.5)
LYMPHOCYTES # BLD AUTO: 0.8 K/UL (ref 1–4.8)
LYMPHOCYTES NFR BLD: 22 % (ref 18–48)
MCH RBC QN AUTO: 28.6 PG (ref 27–31)
MCHC RBC AUTO-ENTMCNC: 31.7 G/DL (ref 32–36)
MCV RBC AUTO: 90 FL (ref 82–98)
MONOCYTES # BLD AUTO: 0.6 K/UL (ref 0.3–1)
MONOCYTES NFR BLD: 17.7 % (ref 4–15)
NEUTROPHILS # BLD AUTO: 1.9 K/UL (ref 1.8–7.7)
NEUTROPHILS NFR BLD: 53.7 % (ref 38–73)
NRBC BLD-RTO: 0 /100 WBC
PLATELET # BLD AUTO: 227 K/UL (ref 150–450)
PMV BLD AUTO: 9.4 FL (ref 9.2–12.9)
RBC # BLD AUTO: 3.57 M/UL (ref 4–5.4)
WBC # BLD AUTO: 3.55 K/UL (ref 3.9–12.7)

## 2022-03-08 PROCEDURE — 1126F AMNT PAIN NOTED NONE PRSNT: CPT | Mod: CPTII,S$GLB,, | Performed by: NURSE PRACTITIONER

## 2022-03-08 PROCEDURE — 3075F SYST BP GE 130 - 139MM HG: CPT | Mod: CPTII,S$GLB,, | Performed by: NURSE PRACTITIONER

## 2022-03-08 PROCEDURE — 1101F PT FALLS ASSESS-DOCD LE1/YR: CPT | Mod: CPTII,S$GLB,, | Performed by: NURSE PRACTITIONER

## 2022-03-08 PROCEDURE — 36415 COLL VENOUS BLD VENIPUNCTURE: CPT | Performed by: INTERNAL MEDICINE

## 2022-03-08 PROCEDURE — 1157F PR ADVANCE CARE PLAN OR EQUIV PRESENT IN MEDICAL RECORD: ICD-10-PCS | Mod: CPTII,S$GLB,, | Performed by: NURSE PRACTITIONER

## 2022-03-08 PROCEDURE — 1126F PR PAIN SEVERITY QUANTIFIED, NO PAIN PRESENT: ICD-10-PCS | Mod: CPTII,S$GLB,, | Performed by: NURSE PRACTITIONER

## 2022-03-08 PROCEDURE — 1159F MED LIST DOCD IN RCRD: CPT | Mod: CPTII,S$GLB,, | Performed by: NURSE PRACTITIONER

## 2022-03-08 PROCEDURE — 3288F FALL RISK ASSESSMENT DOCD: CPT | Mod: CPTII,S$GLB,, | Performed by: NURSE PRACTITIONER

## 2022-03-08 PROCEDURE — 1101F PR PT FALLS ASSESS DOC 0-1 FALLS W/OUT INJ PAST YR: ICD-10-PCS | Mod: CPTII,S$GLB,, | Performed by: NURSE PRACTITIONER

## 2022-03-08 PROCEDURE — 1160F RVW MEDS BY RX/DR IN RCRD: CPT | Mod: CPTII,S$GLB,, | Performed by: NURSE PRACTITIONER

## 2022-03-08 PROCEDURE — 3066F PR DOCUMENTATION OF TREATMENT FOR NEPHROPATHY: ICD-10-PCS | Mod: CPTII,S$GLB,, | Performed by: NURSE PRACTITIONER

## 2022-03-08 PROCEDURE — 4010F PR ACE/ARB THEARPY RXD/TAKEN: ICD-10-PCS | Mod: CPTII,S$GLB,, | Performed by: NURSE PRACTITIONER

## 2022-03-08 PROCEDURE — 99215 OFFICE O/P EST HI 40 MIN: CPT | Mod: S$GLB,,, | Performed by: NURSE PRACTITIONER

## 2022-03-08 PROCEDURE — 1159F PR MEDICATION LIST DOCUMENTED IN MEDICAL RECORD: ICD-10-PCS | Mod: CPTII,S$GLB,, | Performed by: NURSE PRACTITIONER

## 2022-03-08 PROCEDURE — 99999 PR PBB SHADOW E&M-EST. PATIENT-LVL V: ICD-10-PCS | Mod: PBBFAC,,, | Performed by: NURSE PRACTITIONER

## 2022-03-08 PROCEDURE — 3008F BODY MASS INDEX DOCD: CPT | Mod: CPTII,S$GLB,, | Performed by: NURSE PRACTITIONER

## 2022-03-08 PROCEDURE — 3066F NEPHROPATHY DOC TX: CPT | Mod: CPTII,S$GLB,, | Performed by: NURSE PRACTITIONER

## 2022-03-08 PROCEDURE — 1160F PR REVIEW ALL MEDS BY PRESCRIBER/CLIN PHARMACIST DOCUMENTED: ICD-10-PCS | Mod: CPTII,S$GLB,, | Performed by: NURSE PRACTITIONER

## 2022-03-08 PROCEDURE — 4010F ACE/ARB THERAPY RXD/TAKEN: CPT | Mod: CPTII,S$GLB,, | Performed by: NURSE PRACTITIONER

## 2022-03-08 PROCEDURE — 3288F PR FALLS RISK ASSESSMENT DOCUMENTED: ICD-10-PCS | Mod: CPTII,S$GLB,, | Performed by: NURSE PRACTITIONER

## 2022-03-08 PROCEDURE — 99999 PR PBB SHADOW E&M-EST. PATIENT-LVL V: CPT | Mod: PBBFAC,,, | Performed by: NURSE PRACTITIONER

## 2022-03-08 PROCEDURE — 3008F PR BODY MASS INDEX (BMI) DOCUMENTED: ICD-10-PCS | Mod: CPTII,S$GLB,, | Performed by: NURSE PRACTITIONER

## 2022-03-08 PROCEDURE — 3078F PR MOST RECENT DIASTOLIC BLOOD PRESSURE < 80 MM HG: ICD-10-PCS | Mod: CPTII,S$GLB,, | Performed by: NURSE PRACTITIONER

## 2022-03-08 PROCEDURE — 3075F PR MOST RECENT SYSTOLIC BLOOD PRESS GE 130-139MM HG: ICD-10-PCS | Mod: CPTII,S$GLB,, | Performed by: NURSE PRACTITIONER

## 2022-03-08 PROCEDURE — 1157F ADVNC CARE PLAN IN RCRD: CPT | Mod: CPTII,S$GLB,, | Performed by: NURSE PRACTITIONER

## 2022-03-08 PROCEDURE — 99215 PR OFFICE/OUTPT VISIT, EST, LEVL V, 40-54 MIN: ICD-10-PCS | Mod: S$GLB,,, | Performed by: NURSE PRACTITIONER

## 2022-03-08 PROCEDURE — 85025 COMPLETE CBC W/AUTO DIFF WBC: CPT | Performed by: INTERNAL MEDICINE

## 2022-03-08 PROCEDURE — 3078F DIAST BP <80 MM HG: CPT | Mod: CPTII,S$GLB,, | Performed by: NURSE PRACTITIONER

## 2022-03-08 RX ORDER — AMLODIPINE BESYLATE 2.5 MG/1
TABLET ORAL
COMMUNITY
Start: 2022-01-18 | End: 2022-11-11

## 2022-03-08 NOTE — PROGRESS NOTES
Nadia Damon  03/11/2022  [id number]    Luz Dickson NP  Patient Care Team:  Luz Dickson NP as PCP - General (Family Medicine)  Miguel Soni Jr., MD as Consulting Physician (Vascular Surgery)  Ike King MD as Consulting Physician (Cardiology)  Courtney Tubbs MD as Consulting Physician (Cardiology)  Carter Crawford MD as Consulting Physician (Nephrology)  Paxton Vasques OD as Consulting Physician (Optometry)  PRANAY Villalobos MD as Obstetrician (Obstetrics)  Parker Mccarthy IV, MD (Inactive) (Urology)  Ike King MD as Consulting Physician (Cardiology)  Jose Roland MD as Consulting Physician (Dermatology)  Prasanth Johnson MD as Consulting Physician (Rheumatology)  Ayanna Hart, RN as Oncology Navigator  Nora Morin LMSW as         [facility] Primary Care Note      Chief Complaint:  Chief Complaint   Patient presents with    Follow-up       History of Present Illness:  HPI    F/U breast abscess, HTN.    SBP sl elevated 140s past month.   HR>100 earlier this month.     Today HR 98 bpm resting.    Some increased swelling BLEs, calf pain unchanged by activity.   Uncertain onset.         Review of Systems   Constitutional: Negative for chills, fever and malaise/fatigue.   Respiratory: Negative for cough and shortness of breath.    Cardiovascular: Positive for leg swelling. Negative for chest pain and palpitations.   Gastrointestinal: Negative for abdominal pain, heartburn, nausea and vomiting.   Genitourinary: Negative for dysuria.   Musculoskeletal: Negative for joint pain and myalgias (BLE pain).   Neurological: Negative for dizziness.   Psychiatric/Behavioral: Negative for depression.         The following were reviewed: Active problem list, medication list, allergies, family history, social history, and Health Maintenance.     History:  Past Medical History:   Diagnosis Date    Abdominal wall hernia     CT Renal 6/11/2018---Small fat containing superior  ventral abdominal wall hernia at the epicardial pacing lead site.    Anxiety     Arthritis     ZEN HIPS    Breast cancer in female 08/2021    LEFT BREAST    Cellulitis of axilla, left 12/23/2021    Chronic diastolic heart failure 12/16/2021    Chronic kidney disease     stage 4, GFR 15-29 ml/min    Chronic midline low back pain without sciatica 6/18/2018    Coronary artery disease 1993    heart transplant    Depression     Fibromyalgia     on Lyrica    Heart failure     native heart cardiomyopathy    Heart transplanted 1993    due to cardiomyopathy    History of hyperparathyroidism; Hyperparathyroidism, secondary renal     PT DENIES    Hypertension     Immune disorder     anti rejection meds    Iron deficiency anemia 8/15/2017    Kidney stones     passed per pt    Obesity     Other osteoporosis without current pathological fracture 8/30/2019    Shingles 2003 approx    left leg    Trouble in sleeping     Urinary incontinence      Past Surgical History:   Procedure Laterality Date    BLADDER SURGERY  2015 approx    mesh - Dr Everett then 2nd reconstructive sx Dr Onofre    BREAST BIOPSY Bilateral     NEGATIVE    BREAST SURGERY Left 9/28/15    Bx - benign    BREAST SURGERY Right 12/2015    Bx benign    CARDIAC PACEMAKER REMOVAL Left 06/26/2014    Pacer defirillator removed. Put in 1993 aat time of heart transplant    CARPAL TUNNEL RELEASE Left 03/03/15    Dr. Hall    COLONOSCOPY N/A 2/25/2021    Procedure: COLONOSCOPY;  Surgeon: Freida Ramirez MD;  Location: [facility];  Service: Endoscopy;  Laterality: N/A;    HEART TRANSPLANT  1993    HERNIA REPAIR Right 1971 approx    Inguinal    HYSTERECTOMY  1983    vag hyst /LSO     INCISION AND DRAINAGE OF ABSCESS Left 12/24/2021    Procedure: INCISION AND DRAINAGE, ABSCESS;  Surgeon: Joseph Longo MD;  Location: [facility] OR;  Service: General;  Laterality: Left;    INSERTION OF TUNNELED CENTRAL VENOUS CATHETER (CVC) WITH SUBCUTANEOUS PORT  N/A 2021    Procedure: [id number];  Surgeon: Christoph Douglas MD;  Location: [facility] OR;  Service: General;  Laterality: N/A;    REMOVAL OF VASCULAR ACCESS PORT      SENTINEL LYMPH NODE BIOPSY Left 10/12/2021    Procedure: BIOPSY, LYMPH NODE, SENTINEL;  Surgeon: Christoph Douglas MD;  Location: [facility] OR;  Service: General;  Laterality: Left;    TOE SURGERY       Family History   Problem Relation Age of Onset    Cancer Mother 38        breast    Breast cancer Mother     Heart disease Maternal Grandmother     Cataracts Cousin     Hypertension Son     Diabetes Neg Hx     Stroke Neg Hx     Kidney disease Neg Hx     Asthma Neg Hx     COPD Neg Hx     Melanoma Neg Hx     Hyperlipidemia Neg Hx      Social History     Socioeconomic History    Marital status: Single    Number of children: 2    Highest education level: 11th grade   Occupational History    Occupation: Retired   Tobacco Use    Smoking status: Never Smoker    Smokeless tobacco: Never Used   Substance and Sexual Activity    Alcohol use: Never     Alcohol/week: 0.0 standard drinks    Drug use: No    Sexual activity: Not Currently     Partners: Male     Birth control/protection: See Surgical Hx   Other Topics Concern    Are you pregnant or think you may be? No    Breast-feeding No   Social History Narrative    Single. 2 children , 1  at 31 yo2014 strep throat -  pneumonia and renal complications after not completing course of AB. Other child lives in [place], [place]. Has a cousin locally that could help in an emergency. 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 Caffeine intake =- 1 cola a day. No coffee, + occasional tea, avoids caffeine especially at night. Still drives. She does not have a Living Will or Advanced directive.      Social Determinants of Health     Financial Resource Strain: Low Risk     Difficulty of Paying Living Expenses: Not hard at all   Food Insecurity: No Food Insecurity     Worried About Running Out of Food in the Last Year: Never true    Ran Out of Food in the Last Year: Never true   Transportation Needs: No Transportation Needs    Lack of Transportation (Medical): No    Lack of Transportation (Non-Medical): No   Physical Activity: Insufficiently Active    Days of Exercise per Week: 2 days    Minutes of Exercise per Session: 10 min   Stress: No Stress Concern Present    Feeling of Stress : Not at all   Social Connections: Socially Isolated    Frequency of Communication with Friends and Family: Once a week    Frequency of Social Gatherings with Friends and Family: Once a week    Attends Sikhism Services: More than 4 times per year    Active Member of Clubs or Organizations: No    Attends Club or Organization Meetings: Never    Marital Status: Never    Housing Stability: Low Risk     Unable to Pay for Housing in the Last Year: No    Number of Places Lived in the Last Year: 1    Unstable Housing in the Last Year: No     Patient Active Problem List   Diagnosis    Heart transplanted    Anxiety    Insomnia    CKD (chronic kidney disease) stage 4, GFR 15-29 ml/min    Immunosuppression due to drug therapy    Fibromyalgia    Gastroesophageal reflux disease without esophagitis    Breast screening    Immunization deficiency    Essential hypertension    Aortic atherosclerosis    Chronic major depressive disorder, recurrent episode    Iron deficiency anemia    Vaginal atrophy    Hyperparathyroidism    Hx of falling    Chronic midline low back pain without sciatica    Closed nondisplaced fracture of distal phalanx of left great toe with routine healing    Lightheadedness    Medication side effects    Obesity (BMI 30.0-34.9)    Edema    Asymptomatic menopausal state     Other osteoporosis without current pathological fracture    Cough    Abnormal CT scan, kidney    Weakness of both lower extremities    Immunocompromised patient    Osteoporosis,  post-menopausal    Ductal carcinoma in situ (DCIS) of left breast    Immunodeficiency secondary to radiation therapy    Abnormal mammogram    The hangs, uncomplicated    Severe sepsis    GRACE (acute kidney injury)    Diarrhea    Thrombocytopenia, unspecified    Immunodeficiency due to chemotherapy    C. difficile colitis    Strain of left shoulder    Left shoulder pain    Ulnar neuropathy of left upper extremity    Anemia associated with stage 4 chronic renal failure    Secondary and unspecified malignant neoplasm of axilla and upper limb lymph nodes     Review of patient's allergies indicates:   Allergen Reactions    Lisinopril Swelling and Rash    Augmentin [amoxicillin-pot clavulanate] Diarrhea       Medications:  Current Outpatient Medications on File Prior to Visit   Medication Sig Dispense Refill    acetaminophen (TYLENOL) 325 MG tablet Take 1 tablet (325 mg total) by mouth every 6 (six) hours as needed for Pain.  0    amLODIPine (NORVASC) 2.5 MG tablet       aspirin (ECOTRIN) 81 MG EC tablet Take 1 tablet (81 mg total) by mouth once daily. 90 tablet 3    biotin 10,000 mcg Cap Take 1 tablet by mouth once daily.      busPIRone (BUSPAR) 10 MG tablet Take 1 tablet (10 mg total) by mouth once daily. 90 tablet 3    calcitonin, salmon, (FORTICAL) 200 unit/actuation nasal spray 1 spray by Nasal route once daily. 3.7 mL 3    carvediloL (COREG) 6.25 MG tablet Take 1 tablet (6.25 mg total) by mouth 2 (two) times daily. 180 tablet 3    cloNIDine (CATAPRES) 0.1 MG tablet Take 1 tablet (0.1 mg total) by mouth nightly as needed (BP >150/95). 90 tablet 3    cycloSPORINE modified, NEORAL, (NEORAL) 25 MG capsule Take 3 capsules (75 mg total) by mouth 2 (two) times daily. 270 capsule 11    DULoxetine (CYMBALTA) 30 MG capsule TAKE 1 CAPSULE EVERY DAY 90 capsule 1    EVENING PRIMROSE OIL ORAL Take 1,000 mg by mouth once daily.      furosemide (LASIX) 20 MG tablet Take 1 tablet (20 mg total) by mouth 2  (two) times a day for 5 days, THEN 1 tablet (20 mg total) once daily. Take 1 tablet daily. 370 tablet 0    hydrALAZINE (APRESOLINE) 50 MG tablet TAKE 2 TABLETS  EVERY 8  HOURS. 540 tablet 3    losartan (COZAAR) 25 MG tablet Take 1 tablet (25 mg total) by mouth once daily. 90 tablet 3    multivitamin capsule Take 1 capsule by mouth once daily.      ondansetron (ZOFRAN-ODT) 8 MG TbDL Take 1 tablet (8 mg total) by mouth every 8 (eight) hours as needed (nausea). 60 tablet 5    pantoprazole (PROTONIX) 40 MG tablet Take 1 tablet (40 mg total) by mouth once daily. 90 tablet 1    pregabalin (LYRICA) 50 MG capsule Take 1 capsule (50 mg total) by mouth 2 (two) times daily. NOON & NIGHT TIME 180 capsule 1    temazepam (RESTORIL) 30 mg capsule Take 1 at bedtime 90 capsule 1    vitamin E 400 UNIT capsule Take 400 Units by mouth once daily.      zolpidem (AMBIEN) 10 mg Tab TAKE 1 TABLET BY MOUTH ONCE DAILY IN THE EVENING 90 tablet 1     Current Facility-Administered Medications on File Prior to Visit   Medication Dose Route Frequency Provider Last Rate Last Admin    denosumab (PROLIA) injection 60 mg  60 mg Subcutaneous Q6 Months Prasanth Johnson MD        lactated ringers infusion   Intravenous Continuous Jennifer Carr MD 10 mL/hr at 11/09/21 0717 Restarted at 11/09/21 0820       Medications have been reviewed and reconciled with patient at visit today.    Barriers to medications present (yes )    Adverse reactions to current medications (no)      Exam:  Vitals:    03/08/22 1144   BP: 130/64   Pulse: 98   Resp: 16   Temp:      Weight: 74.9 kg (165 lb 2 oz)   Body mass index is 30.2 kg/m².      BP Readings from Last 3 Encounters:   03/08/22 130/64   02/28/22 (!) 147/92   02/14/22 126/71     Wt Readings from Last 3 Encounters:   03/09/22 1420 74.8 kg (165 lb)   03/08/22 1109 74.9 kg (165 lb 2 oz)   02/28/22 0946 75.4 kg (166 lb 3.6 oz)            Physical Exam  Constitutional:       General: She is not in acute  distress.  HENT:      Mouth/Throat:      Mouth: Mucous membranes are moist.   Eyes:      General: No scleral icterus.  Cardiovascular:      Rate and Rhythm: Normal rate and regular rhythm.      Pulses: Normal pulses.   Pulmonary:      Effort: Pulmonary effort is normal. No respiratory distress.      Breath sounds: Normal breath sounds.   Abdominal:      Palpations: Abdomen is soft.      Tenderness: There is no abdominal tenderness.   Musculoskeletal:         General: Swelling (nonpitting BLE) present.      Cervical back: Neck supple.   Skin:     General: Skin is warm and dry.   Neurological:      Mental Status: She is alert. Mental status is at baseline.   Psychiatric:         Mood and Affect: Mood normal.         Thought Content: Thought content normal.         Laboratory Reviewed: (Yes)  Lab Results   Component Value Date    WBC 3.55 (L) 03/08/2022    HGB 10.2 (L) 03/08/2022    HCT 32.2 (L) 03/08/2022     03/08/2022    CHOL 157 12/24/2021    TRIG 120 12/24/2021    HDL 42 12/24/2021    ALT 28 01/20/2022    AST 39 01/20/2022     02/08/2022    K 4.7 02/08/2022     02/08/2022    CREATININE 2.5 (H) 02/08/2022    BUN 30 (H) 02/08/2022    CO2 24 02/08/2022    TSH 5.158 (H) 12/24/2021    PSA <0.1 05/27/2008    INR 1.0 11/22/2021    HGBA1C 5.2 12/24/2021           Health Maintenance  Health Maintenance Topics with due status: Not Due       Topic Last Completion Date    Pneumococcal Vaccines (Age 65+) 10/22/2018    TETANUS VACCINE 09/09/2020    Colorectal Cancer Screening 02/25/2021    DEXA Scan 08/03/2021    Lipid Panel 12/24/2021    Mammogram 02/09/2022     Health Maintenance Due   Topic Date Due    COVID-19 Vaccine (3 - Moderna risk 4-dose series) 11/25/2021       Assessment:  Problem List Items Addressed This Visit    None     Visit Diagnoses     Pain in both lower extremities    -  Primary    Relevant Orders    CV Ultrasound doppler venous legs bilat (Completed)            Plan:  Pain in both lower  extremities  -     CV Ultrasound doppler venous legs bilat; Future      -Patient's lab results were reviewed and discussed with patient  -Treatment options and alternatives were discussed with the patient. Patient expressed understanding. Patient was given the opportunity to ask questions and be an active participant in their medical care. Patient had no further questions or concerns at this time.   -Documentation of patient's health and condition was obtained from family member who was present during visit.  -Patient is an overall moderate risk for health complications from their medical conditions.       Follow up: Follow up in about 3 months (around 6/8/2022).      After visit summary printed and given to patient upon discharge.  Patient goals and care plan are included in After visit summary.    Total medical decision making time was 43 min.  The following issues were discussed: The encounter diagnosis was Pain in both lower extremities.    Health maintenance needs, recent test results and goals of care discussed with pt and questions answered.

## 2022-03-09 ENCOUNTER — OFFICE VISIT (OUTPATIENT)
Dept: HEMATOLOGY/ONCOLOGY | Facility: CLINIC | Age: 67
End: 2022-03-09
Payer: MEDICARE

## 2022-03-09 ENCOUNTER — HOSPITAL ENCOUNTER (OUTPATIENT)
Dept: CARDIOLOGY | Facility: HOSPITAL | Age: 67
Discharge: HOME | End: 2022-03-09
Attending: NURSE PRACTITIONER
Payer: MEDICARE

## 2022-03-09 VITALS — HEIGHT: 62 IN | BODY MASS INDEX: 30.36 KG/M2 | WEIGHT: 165 LBS

## 2022-03-09 DIAGNOSIS — Z94.1 HEART TRANSPLANTED: ICD-10-CM

## 2022-03-09 DIAGNOSIS — T45.1X5A IMMUNODEFICIENCY DUE TO CHEMOTHERAPY: ICD-10-CM

## 2022-03-09 DIAGNOSIS — Z79.899 IMMUNODEFICIENCY DUE TO CHEMOTHERAPY: ICD-10-CM

## 2022-03-09 DIAGNOSIS — M79.604 PAIN IN BOTH LOWER EXTREMITIES: ICD-10-CM

## 2022-03-09 DIAGNOSIS — D84.9 IMMUNOCOMPROMISED PATIENT: ICD-10-CM

## 2022-03-09 DIAGNOSIS — M79.605 PAIN IN BOTH LOWER EXTREMITIES: ICD-10-CM

## 2022-03-09 DIAGNOSIS — N18.4 CKD (CHRONIC KIDNEY DISEASE) STAGE 4, GFR 15-29 ML/MIN: ICD-10-CM

## 2022-03-09 DIAGNOSIS — D84.821 IMMUNODEFICIENCY DUE TO CHEMOTHERAPY: ICD-10-CM

## 2022-03-09 DIAGNOSIS — D63.1 ANEMIA ASSOCIATED WITH STAGE 4 CHRONIC RENAL FAILURE: Primary | ICD-10-CM

## 2022-03-09 DIAGNOSIS — C77.3 SECONDARY AND UNSPECIFIED MALIGNANT NEOPLASM OF AXILLA AND UPPER LIMB LYMPH NODES: ICD-10-CM

## 2022-03-09 DIAGNOSIS — N18.4 ANEMIA ASSOCIATED WITH STAGE 4 CHRONIC RENAL FAILURE: Primary | ICD-10-CM

## 2022-03-09 DIAGNOSIS — D05.12 DUCTAL CARCINOMA IN SITU (DCIS) OF LEFT BREAST: ICD-10-CM

## 2022-03-09 PROBLEM — D61.811 DRUG-INDUCED PANCYTOPENIA: Status: RESOLVED | Noted: 2021-11-24 | Resolved: 2022-03-09

## 2022-03-09 PROCEDURE — 1157F ADVNC CARE PLAN IN RCRD: CPT | Mod: CPTII,95,, | Performed by: INTERNAL MEDICINE

## 2022-03-09 PROCEDURE — 1159F MED LIST DOCD IN RCRD: CPT | Mod: CPTII,95,, | Performed by: INTERNAL MEDICINE

## 2022-03-09 PROCEDURE — 99499 UNLISTED E&M SERVICE: CPT | Mod: HCNC,95,, | Performed by: INTERNAL MEDICINE

## 2022-03-09 PROCEDURE — 3066F NEPHROPATHY DOC TX: CPT | Mod: CPTII,95,, | Performed by: INTERNAL MEDICINE

## 2022-03-09 PROCEDURE — 99214 PR OFFICE/OUTPT VISIT, EST, LEVL IV, 30-39 MIN: ICD-10-PCS | Mod: 95,,, | Performed by: INTERNAL MEDICINE

## 2022-03-09 PROCEDURE — 93970 EXTREMITY STUDY: CPT | Mod: 26,,, | Performed by: INTERNAL MEDICINE

## 2022-03-09 PROCEDURE — 1157F PR ADVANCE CARE PLAN OR EQUIV PRESENT IN MEDICAL RECORD: ICD-10-PCS | Mod: CPTII,95,, | Performed by: INTERNAL MEDICINE

## 2022-03-09 PROCEDURE — 1160F PR REVIEW ALL MEDS BY PRESCRIBER/CLIN PHARMACIST DOCUMENTED: ICD-10-PCS | Mod: CPTII,95,, | Performed by: INTERNAL MEDICINE

## 2022-03-09 PROCEDURE — 4010F ACE/ARB THERAPY RXD/TAKEN: CPT | Mod: CPTII,95,, | Performed by: INTERNAL MEDICINE

## 2022-03-09 PROCEDURE — 99499 RISK ADDL DX/OHS AUDIT: ICD-10-PCS | Mod: HCNC,95,, | Performed by: INTERNAL MEDICINE

## 2022-03-09 PROCEDURE — 3066F PR DOCUMENTATION OF TREATMENT FOR NEPHROPATHY: ICD-10-PCS | Mod: CPTII,95,, | Performed by: INTERNAL MEDICINE

## 2022-03-09 PROCEDURE — 1160F RVW MEDS BY RX/DR IN RCRD: CPT | Mod: CPTII,95,, | Performed by: INTERNAL MEDICINE

## 2022-03-09 PROCEDURE — 99214 OFFICE O/P EST MOD 30 MIN: CPT | Mod: 95,,, | Performed by: INTERNAL MEDICINE

## 2022-03-09 PROCEDURE — 4010F PR ACE/ARB THEARPY RXD/TAKEN: ICD-10-PCS | Mod: CPTII,95,, | Performed by: INTERNAL MEDICINE

## 2022-03-09 PROCEDURE — 93970 EXTREMITY STUDY: CPT | Mod: 50

## 2022-03-09 PROCEDURE — 1159F PR MEDICATION LIST DOCUMENTED IN MEDICAL RECORD: ICD-10-PCS | Mod: CPTII,95,, | Performed by: INTERNAL MEDICINE

## 2022-03-09 PROCEDURE — 93970 CV US DOPPLER VENOUS LEGS BILATERAL (CUPID ONLY): ICD-10-PCS | Mod: 26,,, | Performed by: INTERNAL MEDICINE

## 2022-03-09 NOTE — PROGRESS NOTES
Subjective:       Patient ID: Nadia Damon is a 66 y.o. female.    Chief Complaint: Results, Chronic Renal Failure, and Anemia    HPI 66-year-old female status post heart transplant patient recently had intraductal breast carcinoma with microinvasion and 1 lymph node positive treated with 1 cycle of systemic chemotherapy and discontinued because of toxicity.  Scheduled to see radiation oncology for diagnostic considerations therapeutic consideration of radiation.  Patient has anemia secondary to chronic renal failure and is 25 years status post heart transplant; patient seen in video visit consultation    Past Medical History:   Diagnosis Date    Abdominal wall hernia     CT Renal 6/11/2018---Small fat containing superior ventral abdominal wall hernia at the epicardial pacing lead site.    Anxiety     Arthritis     ZEN HIPS    Breast cancer in female 08/2021    LEFT BREAST    Cellulitis of axilla, left 12/23/2021    Chronic diastolic heart failure 12/16/2021    Chronic kidney disease     stage 4, GFR 15-29 ml/min    Chronic midline low back pain without sciatica 6/18/2018    Coronary artery disease 1993    heart transplant    Depression     Fibromyalgia     on Lyrica    Heart failure     native heart cardiomyopathy    Heart transplanted 1993    due to cardiomyopathy    History of hyperparathyroidism; Hyperparathyroidism, secondary renal     PT DENIES    Hypertension     Immune disorder     anti rejection meds    Iron deficiency anemia 8/15/2017    Kidney stones     passed per pt    Obesity     Other osteoporosis without current pathological fracture 8/30/2019    Shingles 2003 approx    left leg    Trouble in sleeping     Urinary incontinence      Family History   Problem Relation Age of Onset    Cancer Mother 38        breast    Breast cancer Mother     Heart disease Maternal Grandmother     Cataracts Cousin     Hypertension Son     Diabetes Neg Hx     Stroke Neg Hx     Kidney  disease Neg Hx     Asthma Neg Hx     COPD Neg Hx     Melanoma Neg Hx     Hyperlipidemia Neg Hx      Social History     Socioeconomic History    Marital status: Single    Number of children: 2    Highest education level: 11th grade   Occupational History    Occupation: Retired   Tobacco Use    Smoking status: Never Smoker    Smokeless tobacco: Never Used   Substance and Sexual Activity    Alcohol use: Never     Alcohol/week: 0.0 standard drinks    Drug use: No    Sexual activity: Not Currently     Partners: Male     Birth control/protection: See Surgical Hx   Other Topics Concern    Are you pregnant or think you may be? No    Breast-feeding No   Social History Narrative    Single. 2 children , 1  at 30 yoa   strep throat -  pneumonia and renal complications after not completing course of AB. Other child lives in [place], [place]. Has a cousin locally that could help in an emergency. 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 Caffeine intake =- 1 cola a day. No coffee, + occasional tea, avoids caffeine especially at night. Still drives. She does not have a Living Will or Advanced directive.      Social Determinants of Health     Financial Resource Strain: Low Risk     Difficulty of Paying Living Expenses: Not hard at all   Food Insecurity: No Food Insecurity    Worried About Running Out of Food in the Last Year: Never true    Ran Out of Food in the Last Year: Never true   Transportation Needs: No Transportation Needs    Lack of Transportation (Medical): No    Lack of Transportation (Non-Medical): No   Physical Activity: Insufficiently Active    Days of Exercise per Week: 2 days    Minutes of Exercise per Session: 10 min   Stress: No Stress Concern Present    Feeling of Stress : Not at all   Social Connections: Socially Isolated    Frequency of Communication with Friends and Family: Once a week    Frequency of Social Gatherings with Friends and Family: Once a  week    Attends Nondenominational Services: More than 4 times per year    Active Member of Clubs or Organizations: No    Attends Club or Organization Meetings: Never    Marital Status: Never    Housing Stability: Low Risk     Unable to Pay for Housing in the Last Year: No    Number of Places Lived in the Last Year: 1    Unstable Housing in the Last Year: No     Past Surgical History:   Procedure Laterality Date    BLADDER SURGERY  2015 approx    mesh - Dr Everett then 2nd reconstructive sx Dr Onofre    BREAST BIOPSY Bilateral     NEGATIVE    BREAST SURGERY Left 9/28/15    Bx - benign    BREAST SURGERY Right 12/2015    Bx benign    CARDIAC PACEMAKER REMOVAL Left 06/26/2014    Pacer defirillator removed. Put in 1993 aat time of heart transplant    CARPAL TUNNEL RELEASE Left 03/03/15    Dr. Hall    COLONOSCOPY N/A 2/25/2021    Procedure: COLONOSCOPY;  Surgeon: Freida Ramirez MD;  Location: [facility];  Service: Endoscopy;  Laterality: N/A;    HEART TRANSPLANT  1993    HERNIA REPAIR Right 1971 approx    Inguinal    HYSTERECTOMY  1983    vag hyst /LSO     INCISION AND DRAINAGE OF ABSCESS Left 12/24/2021    Procedure: INCISION AND DRAINAGE, ABSCESS;  Surgeon: Joseph Longo MD;  Location: [facility] OR;  Service: General;  Laterality: Left;    INSERTION OF TUNNELED CENTRAL VENOUS CATHETER (CVC) WITH SUBCUTANEOUS PORT N/A 11/9/2021    Procedure: [id number]-A-CATH;  Surgeon: Christoph Douglas MD;  Location: [facility] OR;  Service: General;  Laterality: N/A;    REMOVAL OF VASCULAR ACCESS PORT      SENTINEL LYMPH NODE BIOPSY Left 10/12/2021    Procedure: BIOPSY, LYMPH NODE, SENTINEL;  Surgeon: Christoph Douglas MD;  Location: [facility] OR;  Service: General;  Laterality: Left;    TOE SURGERY         Labs:  Lab Results   Component Value Date    WBC 3.55 (L) 03/08/2022    HGB 10.2 (L) 03/08/2022    HCT 32.2 (L) 03/08/2022    MCV 90 03/08/2022     03/08/2022     BMP  Lab Results   Component Value Date      02/08/2022    K 4.7 02/08/2022     02/08/2022    CO2 24 02/08/2022    BUN 30 (H) 02/08/2022    CREATININE 2.5 (H) 02/08/2022    CALCIUM 8.8 02/08/2022    ANIONGAP 12 02/08/2022    ESTGFRAFRICA 22 (A) 02/08/2022    EGFRNONAA 19 (A) 02/08/2022     Lab Results   Component Value Date    ALT 28 01/20/2022    AST 39 01/20/2022    ALKPHOS 143 (H) 01/20/2022    BILITOT 0.5 01/20/2022       Lab Results   Component Value Date    IRON 36 02/08/2022    TIBC 234 (L) 02/08/2022    FERRITIN 473 (H) 02/08/2022     No results found for: [id number]  No results found for: FOLATE  Lab Results   Component Value Date    TSH 5.158 (H) 12/24/2021         Review of Systems   Constitutional: Positive for fatigue.   Neurological: Positive for weakness.   Psychiatric/Behavioral: Positive for dysphoric mood. The patient is nervous/anxious.        Objective:      Physical Exam  Constitutional:       Appearance: She is ill-appearing.             Assessment:      1. Anemia associated with stage 4 chronic renal failure    2. Heart transplanted    3. Ductal carcinoma in situ (DCIS) of left breast    4. CKD (chronic kidney disease) stage 4, GFR 15-29 ml/min    5. Secondary and unspecified malignant neoplasm of axilla and upper limb lymph nodes    6. Immunocompromised patient    7. Immunodeficiency due to chemotherapy           Plan:     The patient location is:  home  The chief complaint leading to consultation is:  Anemia renal failure    Visit type:  Synchronous audio video    Face to Face time with patient:25 minutes of total time spent on the encounter, which includes face to face time and non-face to face time preparing to see the patient (eg, review of tests), Obtaining and/or reviewing separately obtained history, Documenting clinical information in the electronic or other health record, Independently interpreting results (not separately reported) and communicating results to the patient/family/caregiver, or Care coordination (not  separately reported).         Each patient to whom he or she provides medical services by telemedicine is:  (1) informed of the relationship between the physician and patient and the respective role of any other health care provider with respect to management of the patient; and (2) notified that he or she may decline to receive medical services by telemedicine and may withdraw from such care at any time.    Notes:  Extensive conversation reviewed information with patient at this time anemia from renal failure hemoglobin above Medicare guidelines hold return nurse practitioner/AP P 1 month CBC iron status BMP most likely need to reduce dose erythropoietin to perhaps 81083 units Q 2 weeks.  At this time also scheduled to see radiation oncology also referral made for EVUshell status post triple vaccination heart transplant 25 years        Juan Collins Jr, MD FACP

## 2022-03-15 ENCOUNTER — HOSPITAL ENCOUNTER (OUTPATIENT)
Dept: RADIATION THERAPY | Facility: HOSPITAL | Age: 67
Discharge: HOME | End: 2022-03-15
Attending: RADIOLOGY
Payer: MEDICARE

## 2022-03-15 ENCOUNTER — HOSPITAL ENCOUNTER (OUTPATIENT)
Dept: RADIOLOGY | Facility: HOSPITAL | Age: 67
Discharge: HOME | End: 2022-03-15
Attending: RADIOLOGY
Payer: MEDICARE

## 2022-03-15 PROCEDURE — 77334 RADIATION TREATMENT AID(S): CPT | Mod: 26,,, | Performed by: RADIOLOGY

## 2022-03-15 PROCEDURE — 77263 PR  RADIATION THERAPY PLAN COMPLEX: ICD-10-PCS | Mod: ,,, | Performed by: RADIOLOGY

## 2022-03-15 PROCEDURE — 77014 HC CT GUIDANCE RADIATION THERAPY FLDS PLACEMENT: CPT | Mod: TC | Performed by: RADIOLOGY

## 2022-03-15 PROCEDURE — 77290 THER RAD SIMULAJ FIELD CPLX: CPT | Mod: TC | Performed by: RADIOLOGY

## 2022-03-15 PROCEDURE — 77290 PR  SET RADN THERAPY FIELD COMPLEX: ICD-10-PCS | Mod: 26,,, | Performed by: RADIOLOGY

## 2022-03-15 PROCEDURE — 77334 PR  RADN TREATMENT AID(S) COMPLX: ICD-10-PCS | Mod: 26,,, | Performed by: RADIOLOGY

## 2022-03-15 PROCEDURE — 77290 THER RAD SIMULAJ FIELD CPLX: CPT | Mod: 26,,, | Performed by: RADIOLOGY

## 2022-03-15 PROCEDURE — 77334 RADIATION TREATMENT AID(S): CPT | Mod: TC | Performed by: RADIOLOGY

## 2022-03-15 PROCEDURE — 77263 THER RADIOLOGY TX PLNG CPLX: CPT | Mod: ,,, | Performed by: RADIOLOGY

## 2022-03-17 ENCOUNTER — DOCUMENTATION ONLY (OUTPATIENT)
Dept: HEMATOLOGY/ONCOLOGY | Facility: CLINIC | Age: 67
End: 2022-03-17
Payer: MEDICARE

## 2022-03-17 ENCOUNTER — TELEPHONE (OUTPATIENT)
Dept: PRIMARY CARE CLINIC | Facility: CLINIC | Age: 67
End: 2022-03-17
Payer: MEDICARE

## 2022-03-17 NOTE — NURSING
Oncology Navigation   Intake  Date of Diagnosis: 2021  Cancer Type: Breast  Internal / External Referral: Internal  Referral Source: In basket msg from [facility]   Date of Referral: 2021  Initial Nurse Navigator Contact: 2021  Referral to Initial Contact Timeline (days): 1  Date Worked: 3/17/2022  Start of Treatment: 2021     Treatment  Current Status: -- (MD discontinued tx)  Date Presented to Tumor Board: 2021    Surgery: Completed  Surgical Oncologist: Dr. Douglas  Surgery Schedule Date: 10/12/2021    Medical Oncologist: Dr. Collins  Consult Date: 2021  Chemotherapy: Discontinued ( due to side effects)  Chemotherapy Regimen: Taxotere/Cytoxan udenyca    Radiation Oncologist: Dr. Burger    Procedures: Port / PICC  Port / PICC Schedule Date: 2021    General Referrals: Social work; [facility]   Social Work Referral Date: 11/10/2021    ER: Negative  AR: Negative  Her2: Negative    Radiation Oncologist: Dr. Burger    Support Systems: Children; Family members     Acuity  Systemic Treatment - predicted or initiated: More than one treatment modality concurrently (chemotherapy, radiation, etc.) (+2)  Treatment Tolerability: Has not started treatment yet/treatment fully completed and side effects resolved  ECO-3 (+1)  Comorbidities in Medical History: 6+ (+2)  Support: Patient reports adequate support system  Transportation: Adequate transportation for treatment  Verbalizes the need for more education: Yes  Navigation Acuity: 6     Follow Up  Follow up in 27 days (on 2022) for f/u.   Pt called to cancel evusheld injection. Per pt request, appt canceled. Pt knows to call with questions/concerns.

## 2022-03-17 NOTE — PROGRESS NOTES
Pt called to cancel evusheld injection, states she has been fine, everything has been going well health wise for her and she doesn't want to introduce anything else into her body after the chemo caused her so many issues. Canceled appt per pt request, she knows to call should she change her mind, decide to get and/or have any questions.   Oncology Navigation   Intake  Date of Diagnosis: 2021  Cancer Type: Breast  Internal / External Referral: Internal  Referral Source: In basket msg from [facility]   Date of Referral: 2021  Initial Nurse Navigator Contact: 2021  Referral to Initial Contact Timeline (days): 1  Date Worked: 3/17/2022  Start of Treatment: 2021     Treatment  Current Status: -- (MD discontinued tx)  Date Presented to Tumor Board: 2021    Surgery: Completed  Surgical Oncologist: Dr. Douglas  Surgery Schedule Date: 10/12/2021    Medical Oncologist: Dr. Collins  Consult Date: 2021  Chemotherapy: Discontinued ( due to side effects)  Chemotherapy Regimen: Taxotere/Cytoxan udenyca    Radiation Oncologist: Dr. Burger    Procedures: Port / PICC  Port / PICC Schedule Date: 2021    General Referrals: Social work; [facility]   Social Work Referral Date: 11/10/2021    ER: Negative  AR: Negative  Her2: Negative    Radiation Oncologist: Dr. Burger    Support Systems: Children; Family members     Acuity  Systemic Treatment - predicted or initiated: More than one treatment modality concurrently (chemotherapy, radiation, etc.) (+2)  Treatment Tolerability: Has not started treatment yet/treatment fully completed and side effects resolved  ECO-3 (+1)  Comorbidities in Medical History: 6+ (+2)  Support: Patient reports adequate support system  Transportation: Adequate transportation for treatment  Verbalizes the need for more education: Yes  Navigation Acuity: 6     Follow Up  Follow up in 27 days (on 2022) for f/u.

## 2022-03-21 PROCEDURE — 77295 PR 3D RADIOTHERAPY PLAN: ICD-10-PCS | Mod: 26,,, | Performed by: RADIOLOGY

## 2022-03-21 PROCEDURE — 77300 RADIATION THERAPY DOSE PLAN: CPT | Mod: 26,,, | Performed by: RADIOLOGY

## 2022-03-21 PROCEDURE — 77334 RADIATION TREATMENT AID(S): CPT | Mod: TC | Performed by: RADIOLOGY

## 2022-03-21 PROCEDURE — 77293 PR RESPIRATORY MOTION MGMT SIMULATION: ICD-10-PCS | Mod: 26,,, | Performed by: RADIOLOGY

## 2022-03-21 PROCEDURE — 77334 PR  RADN TREATMENT AID(S) COMPLX: ICD-10-PCS | Mod: 26,,, | Performed by: RADIOLOGY

## 2022-03-21 PROCEDURE — 77334 RADIATION TREATMENT AID(S): CPT | Mod: 26,,, | Performed by: RADIOLOGY

## 2022-03-21 PROCEDURE — 77300 PR RADIATION THERAPY,DOSIMETRY PLAN: ICD-10-PCS | Mod: 26,,, | Performed by: RADIOLOGY

## 2022-03-21 PROCEDURE — 77295 3-D RADIOTHERAPY PLAN: CPT | Mod: TC | Performed by: RADIOLOGY

## 2022-03-21 PROCEDURE — 77295 3-D RADIOTHERAPY PLAN: CPT | Mod: 26,,, | Performed by: RADIOLOGY

## 2022-03-21 PROCEDURE — 77300 RADIATION THERAPY DOSE PLAN: CPT | Mod: TC | Performed by: RADIOLOGY

## 2022-03-21 PROCEDURE — 77293 RESPIRATOR MOTION MGMT SIMUL: CPT | Mod: 26,,, | Performed by: RADIOLOGY

## 2022-03-21 PROCEDURE — 77293 RESPIRATOR MOTION MGMT SIMUL: CPT | Mod: TC | Performed by: RADIOLOGY

## 2022-03-23 ENCOUNTER — TELEPHONE (OUTPATIENT)
Dept: TRANSPLANT | Facility: CLINIC | Age: 67
End: 2022-03-23
Payer: MEDICARE

## 2022-03-23 ENCOUNTER — DOCUMENTATION ONLY (OUTPATIENT)
Dept: RADIATION ONCOLOGY | Facility: CLINIC | Age: 67
End: 2022-03-23
Payer: MEDICARE

## 2022-03-23 PROCEDURE — 77417 THER RADIOLOGY PORT IMAGE(S): CPT | Performed by: RADIOLOGY

## 2022-03-23 PROCEDURE — G6002 PR STEREOSCOPIC XRAY GUIDE FOR RADIATION TX DELIV: ICD-10-PCS | Mod: 26,,, | Performed by: RADIOLOGY

## 2022-03-23 PROCEDURE — 77387 GUIDANCE FOR RADJ TX DLVR: CPT | Mod: TC | Performed by: RADIOLOGY

## 2022-03-23 PROCEDURE — 77412 RADIATION TX DELIVERY LVL 3: CPT | Performed by: RADIOLOGY

## 2022-03-23 PROCEDURE — G6002 STEREOSCOPIC X-RAY GUIDANCE: HCPCS | Mod: 26,,, | Performed by: RADIOLOGY

## 2022-03-23 NOTE — PLAN OF CARE
Day 1 outpatient xrt to left breast. Breast handout and verbal instructions given. Skin care and side effects reviewed. Contact info and miaderm cream provided. Patient verbalized understanding.

## 2022-03-24 PROCEDURE — 77387 GUIDANCE FOR RADJ TX DLVR: CPT | Mod: TC | Performed by: RADIOLOGY

## 2022-03-24 PROCEDURE — 77412 RADIATION TX DELIVERY LVL 3: CPT | Performed by: RADIOLOGY

## 2022-03-24 PROCEDURE — G6002 STEREOSCOPIC X-RAY GUIDANCE: HCPCS | Mod: 26,,, | Performed by: RADIOLOGY

## 2022-03-24 PROCEDURE — G6002 PR STEREOSCOPIC XRAY GUIDE FOR RADIATION TX DELIV: ICD-10-PCS | Mod: 26,,, | Performed by: RADIOLOGY

## 2022-03-25 ENCOUNTER — DOCUMENTATION ONLY (OUTPATIENT)
Dept: RADIATION ONCOLOGY | Facility: CLINIC | Age: 67
End: 2022-03-25
Payer: MEDICARE

## 2022-03-25 PROCEDURE — 77387 GUIDANCE FOR RADJ TX DLVR: CPT | Mod: TC | Performed by: RADIOLOGY

## 2022-03-25 PROCEDURE — G6002 STEREOSCOPIC X-RAY GUIDANCE: HCPCS | Mod: 26,,, | Performed by: RADIOLOGY

## 2022-03-25 PROCEDURE — 77412 RADIATION TX DELIVERY LVL 3: CPT | Performed by: RADIOLOGY

## 2022-03-25 PROCEDURE — G6002 PR STEREOSCOPIC XRAY GUIDE FOR RADIATION TX DELIV: ICD-10-PCS | Mod: 26,,, | Performed by: RADIOLOGY

## 2022-03-28 PROCEDURE — G6002 STEREOSCOPIC X-RAY GUIDANCE: HCPCS | Mod: 26,,, | Performed by: RADIOLOGY

## 2022-03-28 PROCEDURE — 77412 RADIATION TX DELIVERY LVL 3: CPT | Performed by: RADIOLOGY

## 2022-03-28 PROCEDURE — G6002 PR STEREOSCOPIC XRAY GUIDE FOR RADIATION TX DELIV: ICD-10-PCS | Mod: 26,,, | Performed by: RADIOLOGY

## 2022-03-28 PROCEDURE — 77387 GUIDANCE FOR RADJ TX DLVR: CPT | Mod: TC | Performed by: RADIOLOGY

## 2022-03-29 PROCEDURE — 77412 RADIATION TX DELIVERY LVL 3: CPT | Performed by: RADIOLOGY

## 2022-03-29 PROCEDURE — 77387 GUIDANCE FOR RADJ TX DLVR: CPT | Mod: TC | Performed by: RADIOLOGY

## 2022-03-29 PROCEDURE — G6002 STEREOSCOPIC X-RAY GUIDANCE: HCPCS | Mod: 26,,, | Performed by: RADIOLOGY

## 2022-03-29 PROCEDURE — G6002 PR STEREOSCOPIC XRAY GUIDE FOR RADIATION TX DELIV: ICD-10-PCS | Mod: 26,,, | Performed by: RADIOLOGY

## 2022-03-30 PROCEDURE — 77336 RADIATION PHYSICS CONSULT: CPT | Performed by: RADIOLOGY

## 2022-03-31 PROCEDURE — 77412 RADIATION TX DELIVERY LVL 3: CPT | Performed by: RADIOLOGY

## 2022-03-31 PROCEDURE — G6002 STEREOSCOPIC X-RAY GUIDANCE: HCPCS | Mod: 26,,, | Performed by: RADIOLOGY

## 2022-03-31 PROCEDURE — 77387 GUIDANCE FOR RADJ TX DLVR: CPT | Mod: TC | Performed by: RADIOLOGY

## 2022-03-31 PROCEDURE — 77417 THER RADIOLOGY PORT IMAGE(S): CPT | Performed by: RADIOLOGY

## 2022-03-31 PROCEDURE — G6002 PR STEREOSCOPIC XRAY GUIDE FOR RADIATION TX DELIV: ICD-10-PCS | Mod: 26,,, | Performed by: RADIOLOGY

## 2022-04-01 ENCOUNTER — DOCUMENTATION ONLY (OUTPATIENT)
Dept: RADIATION ONCOLOGY | Facility: CLINIC | Age: 67
End: 2022-04-01
Payer: MEDICARE

## 2022-04-01 ENCOUNTER — HOSPITAL ENCOUNTER (OUTPATIENT)
Dept: RADIATION THERAPY | Facility: HOSPITAL | Age: 67
Discharge: HOME | End: 2022-04-01
Attending: RADIOLOGY
Payer: MEDICARE

## 2022-04-01 PROCEDURE — 77412 RADIATION TX DELIVERY LVL 3: CPT | Performed by: RADIOLOGY

## 2022-04-01 PROCEDURE — 77387 GUIDANCE FOR RADJ TX DLVR: CPT | Mod: TC | Performed by: RADIOLOGY

## 2022-04-01 PROCEDURE — G6002 PR STEREOSCOPIC XRAY GUIDE FOR RADIATION TX DELIV: ICD-10-PCS | Mod: 26,,, | Performed by: RADIOLOGY

## 2022-04-01 PROCEDURE — G6002 STEREOSCOPIC X-RAY GUIDANCE: HCPCS | Mod: 26,,, | Performed by: RADIOLOGY

## 2022-04-04 PROCEDURE — 77412 RADIATION TX DELIVERY LVL 3: CPT | Performed by: RADIOLOGY

## 2022-04-04 PROCEDURE — G6002 PR STEREOSCOPIC XRAY GUIDE FOR RADIATION TX DELIV: ICD-10-PCS | Mod: 26,,, | Performed by: RADIOLOGY

## 2022-04-04 PROCEDURE — G6002 STEREOSCOPIC X-RAY GUIDANCE: HCPCS | Mod: 26,,, | Performed by: RADIOLOGY

## 2022-04-04 PROCEDURE — 77387 GUIDANCE FOR RADJ TX DLVR: CPT | Mod: TC | Performed by: RADIOLOGY

## 2022-04-05 ENCOUNTER — HOSPITAL ENCOUNTER (OUTPATIENT)
Dept: CARDIOLOGY | Facility: HOSPITAL | Age: 67
Discharge: HOME | End: 2022-04-05
Payer: MEDICARE

## 2022-04-05 ENCOUNTER — HOSPITAL ENCOUNTER (OUTPATIENT)
Dept: RADIOLOGY | Facility: HOSPITAL | Age: 67
Discharge: HOME | End: 2022-04-05
Attending: NURSE PRACTITIONER
Payer: MEDICARE

## 2022-04-05 ENCOUNTER — OFFICE VISIT (OUTPATIENT)
Dept: PRIMARY CARE CLINIC | Facility: CLINIC | Age: 67
End: 2022-04-05
Payer: MEDICARE

## 2022-04-05 ENCOUNTER — TELEPHONE (OUTPATIENT)
Dept: PRIMARY CARE CLINIC | Facility: CLINIC | Age: 67
End: 2022-04-05
Payer: MEDICARE

## 2022-04-05 VITALS
HEIGHT: 62 IN | DIASTOLIC BLOOD PRESSURE: 78 MMHG | TEMPERATURE: 99 F | WEIGHT: 160.5 LBS | OXYGEN SATURATION: 98 % | HEART RATE: 97 BPM | BODY MASS INDEX: 29.53 KG/M2 | SYSTOLIC BLOOD PRESSURE: 122 MMHG

## 2022-04-05 DIAGNOSIS — K21.00 GASTROESOPHAGEAL REFLUX DISEASE WITH ESOPHAGITIS, UNSPECIFIED WHETHER HEMORRHAGE: ICD-10-CM

## 2022-04-05 DIAGNOSIS — K21.9 GASTROESOPHAGEAL REFLUX DISEASE WITHOUT ESOPHAGITIS: Chronic | ICD-10-CM

## 2022-04-05 DIAGNOSIS — R07.89 OTHER CHEST PAIN: ICD-10-CM

## 2022-04-05 DIAGNOSIS — I10 ESSENTIAL HYPERTENSION: ICD-10-CM

## 2022-04-05 DIAGNOSIS — R07.9 CHEST PAIN, UNSPECIFIED TYPE: ICD-10-CM

## 2022-04-05 DIAGNOSIS — N18.4 CKD (CHRONIC KIDNEY DISEASE) STAGE 4, GFR 15-29 ML/MIN: ICD-10-CM

## 2022-04-05 DIAGNOSIS — R07.9 CHEST PAIN, UNSPECIFIED TYPE: Primary | ICD-10-CM

## 2022-04-05 PROCEDURE — 3288F PR FALLS RISK ASSESSMENT DOCUMENTED: ICD-10-PCS | Mod: CPTII,S$GLB,, | Performed by: NURSE PRACTITIONER

## 2022-04-05 PROCEDURE — 99215 PR OFFICE/OUTPT VISIT, EST, LEVL V, 40-54 MIN: ICD-10-PCS | Mod: 25,S$GLB,, | Performed by: NURSE PRACTITIONER

## 2022-04-05 PROCEDURE — 99417 PR PROLONGED SVC, OUTPT, W/WO DIRECT PT CONTACT,  EA ADDTL 15 MIN: ICD-10-PCS | Mod: S$GLB,,, | Performed by: NURSE PRACTITIONER

## 2022-04-05 PROCEDURE — 3074F SYST BP LT 130 MM HG: CPT | Mod: CPTII,S$GLB,, | Performed by: NURSE PRACTITIONER

## 2022-04-05 PROCEDURE — 93010 ELECTROCARDIOGRAM REPORT: CPT | Mod: S$GLB,,, | Performed by: INTERNAL MEDICINE

## 2022-04-05 PROCEDURE — 3066F NEPHROPATHY DOC TX: CPT | Mod: CPTII,S$GLB,, | Performed by: NURSE PRACTITIONER

## 2022-04-05 PROCEDURE — 1125F PR PAIN SEVERITY QUANTIFIED, PAIN PRESENT: ICD-10-PCS | Mod: CPTII,S$GLB,, | Performed by: NURSE PRACTITIONER

## 2022-04-05 PROCEDURE — 3288F FALL RISK ASSESSMENT DOCD: CPT | Mod: CPTII,S$GLB,, | Performed by: NURSE PRACTITIONER

## 2022-04-05 PROCEDURE — 1159F PR MEDICATION LIST DOCUMENTED IN MEDICAL RECORD: ICD-10-PCS | Mod: CPTII,S$GLB,, | Performed by: NURSE PRACTITIONER

## 2022-04-05 PROCEDURE — 99999 PR PBB SHADOW E&M-EST. PATIENT-LVL V: CPT | Mod: PBBFAC,,, | Performed by: NURSE PRACTITIONER

## 2022-04-05 PROCEDURE — 1101F PT FALLS ASSESS-DOCD LE1/YR: CPT | Mod: CPTII,S$GLB,, | Performed by: NURSE PRACTITIONER

## 2022-04-05 PROCEDURE — 99999 PR PBB SHADOW E&M-EST. PATIENT-LVL V: ICD-10-PCS | Mod: PBBFAC,,, | Performed by: NURSE PRACTITIONER

## 2022-04-05 PROCEDURE — 99417 PROLNG OP E/M EACH 15 MIN: CPT | Mod: S$GLB,,, | Performed by: NURSE PRACTITIONER

## 2022-04-05 PROCEDURE — 1101F PR PT FALLS ASSESS DOC 0-1 FALLS W/OUT INJ PAST YR: ICD-10-PCS | Mod: CPTII,S$GLB,, | Performed by: NURSE PRACTITIONER

## 2022-04-05 PROCEDURE — 3078F PR MOST RECENT DIASTOLIC BLOOD PRESSURE < 80 MM HG: ICD-10-PCS | Mod: CPTII,S$GLB,, | Performed by: NURSE PRACTITIONER

## 2022-04-05 PROCEDURE — 1157F ADVNC CARE PLAN IN RCRD: CPT | Mod: CPTII,S$GLB,, | Performed by: NURSE PRACTITIONER

## 2022-04-05 PROCEDURE — G6002 PR STEREOSCOPIC XRAY GUIDE FOR RADIATION TX DELIV: ICD-10-PCS | Mod: 26,,, | Performed by: RADIOLOGY

## 2022-04-05 PROCEDURE — 77387 GUIDANCE FOR RADJ TX DLVR: CPT | Mod: TC | Performed by: RADIOLOGY

## 2022-04-05 PROCEDURE — 71046 XR CHEST PA AND LATERAL: ICD-10-PCS | Mod: 26,,, | Performed by: RADIOLOGY

## 2022-04-05 PROCEDURE — 4010F ACE/ARB THERAPY RXD/TAKEN: CPT | Mod: CPTII,S$GLB,, | Performed by: NURSE PRACTITIONER

## 2022-04-05 PROCEDURE — 93005 EKG 12-LEAD: ICD-10-PCS | Mod: S$GLB,,, | Performed by: NURSE PRACTITIONER

## 2022-04-05 PROCEDURE — 71046 X-RAY EXAM CHEST 2 VIEWS: CPT | Mod: 26,,, | Performed by: RADIOLOGY

## 2022-04-05 PROCEDURE — 99215 OFFICE O/P EST HI 40 MIN: CPT | Mod: 25,S$GLB,, | Performed by: NURSE PRACTITIONER

## 2022-04-05 PROCEDURE — 1125F AMNT PAIN NOTED PAIN PRSNT: CPT | Mod: CPTII,S$GLB,, | Performed by: NURSE PRACTITIONER

## 2022-04-05 PROCEDURE — G6002 STEREOSCOPIC X-RAY GUIDANCE: HCPCS | Mod: 26,,, | Performed by: RADIOLOGY

## 2022-04-05 PROCEDURE — 3008F BODY MASS INDEX DOCD: CPT | Mod: CPTII,S$GLB,, | Performed by: NURSE PRACTITIONER

## 2022-04-05 PROCEDURE — 93010 EKG 12-LEAD: ICD-10-PCS | Mod: S$GLB,,, | Performed by: INTERNAL MEDICINE

## 2022-04-05 PROCEDURE — 4010F PR ACE/ARB THEARPY RXD/TAKEN: ICD-10-PCS | Mod: CPTII,S$GLB,, | Performed by: NURSE PRACTITIONER

## 2022-04-05 PROCEDURE — 71046 X-RAY EXAM CHEST 2 VIEWS: CPT | Mod: TC

## 2022-04-05 PROCEDURE — 3074F PR MOST RECENT SYSTOLIC BLOOD PRESSURE < 130 MM HG: ICD-10-PCS | Mod: CPTII,S$GLB,, | Performed by: NURSE PRACTITIONER

## 2022-04-05 PROCEDURE — 3008F PR BODY MASS INDEX (BMI) DOCUMENTED: ICD-10-PCS | Mod: CPTII,S$GLB,, | Performed by: NURSE PRACTITIONER

## 2022-04-05 PROCEDURE — 99499 RISK ADDL DX/OHS AUDIT: ICD-10-PCS | Mod: S$GLB,,, | Performed by: NURSE PRACTITIONER

## 2022-04-05 PROCEDURE — 99499 UNLISTED E&M SERVICE: CPT | Mod: S$GLB,,, | Performed by: NURSE PRACTITIONER

## 2022-04-05 PROCEDURE — 77412 RADIATION TX DELIVERY LVL 3: CPT | Performed by: RADIOLOGY

## 2022-04-05 PROCEDURE — 3078F DIAST BP <80 MM HG: CPT | Mod: CPTII,S$GLB,, | Performed by: NURSE PRACTITIONER

## 2022-04-05 PROCEDURE — 1159F MED LIST DOCD IN RCRD: CPT | Mod: CPTII,S$GLB,, | Performed by: NURSE PRACTITIONER

## 2022-04-05 PROCEDURE — 93005 ELECTROCARDIOGRAM TRACING: CPT | Mod: S$GLB,,, | Performed by: NURSE PRACTITIONER

## 2022-04-05 PROCEDURE — 1157F PR ADVANCE CARE PLAN OR EQUIV PRESENT IN MEDICAL RECORD: ICD-10-PCS | Mod: CPTII,S$GLB,, | Performed by: NURSE PRACTITIONER

## 2022-04-05 PROCEDURE — 3066F PR DOCUMENTATION OF TREATMENT FOR NEPHROPATHY: ICD-10-PCS | Mod: CPTII,S$GLB,, | Performed by: NURSE PRACTITIONER

## 2022-04-05 RX ORDER — FAMOTIDINE 40 MG/1
40 TABLET, FILM COATED ORAL EVERY OTHER DAY
Qty: 15 TABLET | Refills: 2 | Status: SHIPPED | OUTPATIENT
Start: 2022-04-05 | End: 2022-05-02

## 2022-04-05 RX ORDER — ATENOLOL 50 MG/1
50 TABLET ORAL DAILY
Qty: 30 TABLET | Refills: 0 | Status: SHIPPED | OUTPATIENT
Start: 2022-04-05 | End: 2022-05-25

## 2022-04-05 RX ORDER — SUCRALFATE 1 G/1
1 TABLET ORAL
Qty: 120 TABLET | Refills: 0 | Status: SHIPPED | OUTPATIENT
Start: 2022-04-05 | End: 2022-05-05

## 2022-04-05 NOTE — PROGRESS NOTES
Nadia Damon  04/05/2022  [id number]    Luz Dickson NP  Patient Care Team:  Luz Dickson NP as PCP - General (Family Medicine)  Miguel Soni Jr., MD as Consulting Physician (Vascular Surgery)  Ike King MD as Consulting Physician (Cardiology)  Courtney Tubbs MD as Consulting Physician (Cardiology)  Carter Crawford MD as Consulting Physician (Nephrology)  Paxton Vasques OD as Consulting Physician (Optometry)  PRANAY Villalobos MD as Obstetrician (Obstetrics)  Parker Mccarthy IV, MD (Urology)  Ike King MD as Consulting Physician (Cardiology)  Jose Roland MD as Consulting Physician (Dermatology)  Prasanth Johnson MD as Consulting Physician (Rheumatology)  Ayanna Hart RN as Oncology Navigator  Nora Morin LMSW as         [facility] Primary Care Note      Chief Complaint:  Chief Complaint   Patient presents with    Shortness of Breath    Chest Pain         History of Present Illness:  HPI    Substernal chest pressure intermittent since Sunday. Had stomach ache then dyspepsia. Thought r/t gas. Sx relieved by belching.   No pain/pressure with rest sitting upright. Chest pressure begins with ambulation. Sleeps in a recliner.   Also increased DONOHUE and cough. White, thick sputum.   No relief of chest pressure with Mylanta. Started XRT last week for breast CA.  Some heart palpitations with ambulation.     Feels like she has heartburn that IS relieved by large amounts of Mylanta.     Also soreness to surgical site to left chest wall.      Skin tag right breast is irritated. She would like derm to remove.     Followed by Dr King. Next appt late this year.  No recent stress test per pt.     Out of carvedilol since Friday - on order from [facility].         Review of Systems   Constitutional: Positive for malaise/fatigue (fatigue x 3 days). Negative for chills and fever.   Respiratory: Positive for cough, sputum production (occasional white, thick) and shortness of breath.  Negative for hemoptysis and wheezing.    Cardiovascular: Positive for chest pain, palpitations, orthopnea (chronic, at baseline) and leg swelling (mild).   Gastrointestinal: Positive for heartburn. Negative for abdominal pain, blood in stool, constipation, diarrhea, nausea and vomiting.   Genitourinary: Negative for dysuria.   Musculoskeletal: Negative for back pain, joint pain and myalgias.   Neurological: Negative for dizziness.   Psychiatric/Behavioral: Negative for depression.         The following were reviewed: Active problem list, medication list, allergies, family history, social history, and Health Maintenance.     History:  Past Medical History:   Diagnosis Date    Abdominal wall hernia     CT Renal 6/11/2018---Small fat containing superior ventral abdominal wall hernia at the epicardial pacing lead site.    Anxiety     Arthritis     ZEN HIPS    Breast cancer in female 08/2021    LEFT BREAST    Cellulitis of axilla, left 12/23/2021    Chronic diastolic heart failure 12/16/2021    Chronic kidney disease     stage 4, GFR 15-29 ml/min    Chronic midline low back pain without sciatica 6/18/2018    Coronary artery disease 1993    heart transplant    Depression     Fibromyalgia     on Lyrica    Heart failure     native heart cardiomyopathy    Heart transplanted 1993    due to cardiomyopathy    History of hyperparathyroidism; Hyperparathyroidism, secondary renal     PT DENIES    Hypertension     Immune disorder     anti rejection meds    Iron deficiency anemia 8/15/2017    Kidney stones     passed per pt    Obesity     Other osteoporosis without current pathological fracture 8/30/2019    Ela 2003 approx    left leg    Trouble in sleeping     Urinary incontinence      Past Surgical History:   Procedure Laterality Date    BLADDER SURGERY  2015 approx    mesh - Dr Everett then 2nd reconstructive sx Dr Onofre    BREAST BIOPSY Bilateral     NEGATIVE    BREAST SURGERY Left 9/28/15    Bx  - benign    BREAST SURGERY Right 12/2015    Bx benign    CARDIAC PACEMAKER REMOVAL Left 06/26/2014    Pacer defirillator removed. Put in 1993 aat time of heart transplant    CARPAL TUNNEL RELEASE Left 03/03/15    Dr. Hall    COLONOSCOPY N/A 2/25/2021    Procedure: COLONOSCOPY;  Surgeon: Freida Ramirez MD;  Location: [facility] ENDO;  Service: Endoscopy;  Laterality: N/A;    HEART TRANSPLANT  1993    HERNIA REPAIR Right 1971 approx    Inguinal    HYSTERECTOMY  1983    vag hyst /LSO     INCISION AND DRAINAGE OF ABSCESS Left 12/24/2021    Procedure: INCISION AND DRAINAGE, ABSCESS;  Surgeon: Joseph Longo MD;  Location: [facility] OR;  Service: General;  Laterality: Left;    INSERTION OF TUNNELED CENTRAL VENOUS CATHETER (CVC) WITH SUBCUTANEOUS PORT N/A 11/9/2021    Procedure: [id number];  Surgeon: Christoph Douglas MD;  Location: [facility] OR;  Service: General;  Laterality: N/A;    REMOVAL OF VASCULAR ACCESS PORT      SENTINEL LYMPH NODE BIOPSY Left 10/12/2021    Procedure: BIOPSY, LYMPH NODE, SENTINEL;  Surgeon: Christoph Douglas MD;  Location: [facility] OR;  Service: General;  Laterality: Left;    TOE SURGERY       Family History   Problem Relation Age of Onset    Cancer Mother 38        breast    Breast cancer Mother     Heart disease Maternal Grandmother     Cataracts Cousin     Hypertension Son     Diabetes Neg Hx     Stroke Neg Hx     Kidney disease Neg Hx     Asthma Neg Hx     COPD Neg Hx     Melanoma Neg Hx     Hyperlipidemia Neg Hx      Social History     Socioeconomic History    Marital status: Single    Number of children: 2    Highest education level: 11th grade   Occupational History    Occupation: Retired   Tobacco Use    Smoking status: Never Smoker    Smokeless tobacco: Never Used   Substance and Sexual Activity    Alcohol use: Never     Alcohol/week: 0.0 standard drinks    Drug use: No    Sexual activity: Not Currently     Partners: Male     Birth control/protection: See Surgical  Hx   Other Topics Concern    Are you pregnant or think you may be? No    Breast-feeding No   Social History Narrative    Single. 2 children , 1  at 30 yoa   strep throat -  pneumonia and renal complications after not completing course of AB. Other child lives in [place], [place]. Has a cousin locally that could help in an emergency. 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 Caffeine intake =- 1 cola a day. No coffee, + occasional tea, avoids caffeine especially at night. Still drives. She does not have a Living Will or Advanced directive.      Social Determinants of Health     Financial Resource Strain: Low Risk     Difficulty of Paying Living Expenses: Not hard at all   Food Insecurity: No Food Insecurity    Worried About Running Out of Food in the Last Year: Never true    Ran Out of Food in the Last Year: Never true   Transportation Needs: No Transportation Needs    Lack of Transportation (Medical): No    Lack of Transportation (Non-Medical): No   Physical Activity: Insufficiently Active    Days of Exercise per Week: 2 days    Minutes of Exercise per Session: 10 min   Stress: No Stress Concern Present    Feeling of Stress : Not at all   Social Connections: Socially Isolated    Frequency of Communication with Friends and Family: Once a week    Frequency of Social Gatherings with Friends and Family: Once a week    Attends Baptism Services: More than 4 times per year    Active Member of Clubs or Organizations: No    Attends Club or Organization Meetings: Never    Marital Status: Never    Housing Stability: Low Risk     Unable to Pay for Housing in the Last Year: No    Number of Places Lived in the Last Year: 1    Unstable Housing in the Last Year: No     Patient Active Problem List   Diagnosis    Heart transplanted    Anxiety    Insomnia    CKD (chronic kidney disease) stage 4, GFR 15-29 ml/min    Immunosuppression due to drug therapy     Fibromyalgia    Gastroesophageal reflux disease without esophagitis    Breast screening    Immunization deficiency    Essential hypertension    Aortic atherosclerosis    Chronic major depressive disorder, recurrent episode    Iron deficiency anemia    Vaginal atrophy    Hyperparathyroidism    Hx of falling    Chronic midline low back pain without sciatica    Closed nondisplaced fracture of distal phalanx of left great toe with routine healing    Lightheadedness    Medication side effects    Obesity (BMI 30.0-34.9)    Edema    Asymptomatic menopausal state     Other osteoporosis without current pathological fracture    Cough    Abnormal CT scan, kidney    Weakness of both lower extremities    Immunocompromised patient    Osteoporosis, post-menopausal    Ductal carcinoma in situ (DCIS) of left breast    Immunodeficiency secondary to radiation therapy    Abnormal mammogram    The hangs, uncomplicated    Severe sepsis    GRACE (acute kidney injury)    Diarrhea    Thrombocytopenia, unspecified    Immunodeficiency due to chemotherapy    C. difficile colitis    Strain of left shoulder    Left shoulder pain    Ulnar neuropathy of left upper extremity    Anemia associated with stage 4 chronic renal failure    Secondary and unspecified malignant neoplasm of axilla and upper limb lymph nodes    Other chest pain     Review of patient's allergies indicates:   Allergen Reactions    Lisinopril Swelling and Rash    Augmentin [amoxicillin-pot clavulanate] Diarrhea       Medications:  Current Outpatient Medications on File Prior to Visit   Medication Sig Dispense Refill    acetaminophen (TYLENOL) 325 MG tablet Take 1 tablet (325 mg total) by mouth every 6 (six) hours as needed for Pain.  0    amLODIPine (NORVASC) 2.5 MG tablet       aspirin (ECOTRIN) 81 MG EC tablet Take 1 tablet (81 mg total) by mouth once daily. 90 tablet 3    biotin 10,000 mcg Cap Take 1 tablet by mouth once daily.       busPIRone (BUSPAR) 10 MG tablet Take 1 tablet (10 mg total) by mouth once daily. 90 tablet 3    calcitonin, salmon, (FORTICAL) 200 unit/actuation nasal spray 1 spray by Nasal route once daily. 3.7 mL 3    carvediloL (COREG) 6.25 MG tablet Take 1 tablet (6.25 mg total) by mouth 2 (two) times daily. 180 tablet 3    cloNIDine (CATAPRES) 0.1 MG tablet Take 1 tablet (0.1 mg total) by mouth nightly as needed (BP >150/95). 90 tablet 3    cycloSPORINE modified, NEORAL, (NEORAL) 25 MG capsule Take 3 capsules (75 mg total) by mouth 2 (two) times daily. 270 capsule 11    DULoxetine (CYMBALTA) 30 MG capsule TAKE 1 CAPSULE EVERY DAY 90 capsule 1    EVENING PRIMROSE OIL ORAL Take 1,000 mg by mouth once daily.      furosemide (LASIX) 20 MG tablet Take 1 tablet (20 mg total) by mouth 2 (two) times a day for 5 days, THEN 1 tablet (20 mg total) once daily. Take 1 tablet daily. 370 tablet 0    hydrALAZINE (APRESOLINE) 50 MG tablet TAKE 2 TABLETS  EVERY 8  HOURS. 540 tablet 3    losartan (COZAAR) 25 MG tablet Take 1 tablet (25 mg total) by mouth once daily. 90 tablet 3    multivitamin capsule Take 1 capsule by mouth once daily.      ondansetron (ZOFRAN-ODT) 8 MG TbDL Take 1 tablet (8 mg total) by mouth every 8 (eight) hours as needed (nausea). 60 tablet 5    pantoprazole (PROTONIX) 40 MG tablet Take 1 tablet (40 mg total) by mouth once daily. 90 tablet 1    pregabalin (LYRICA) 50 MG capsule Take 1 capsule (50 mg total) by mouth 2 (two) times daily. NOON & NIGHT TIME 180 capsule 1    temazepam (RESTORIL) 30 mg capsule Take 1 at bedtime 90 capsule 1    vitamin E 400 UNIT capsule Take 400 Units by mouth once daily.      zolpidem (AMBIEN) 10 mg Tab TAKE 1 TABLET BY MOUTH ONCE DAILY IN THE EVENING 90 tablet 1     Current Facility-Administered Medications on File Prior to Visit   Medication Dose Route Frequency Provider Last Rate Last Admin    denosumab (PROLIA) injection 60 mg  60 mg Subcutaneous Q6 Months Prasanth Johnson MD         lactated ringers infusion   Intravenous Continuous Jennifer Carr MD 10 mL/hr at 11/09/21 0717 Restarted at 11/09/21 0820       Medications have been reviewed and reconciled with patient at visit today.    Barriers to medications present (no )    Adverse reactions to current medications (no)      Exam:  Vitals:    04/05/22 1414   BP: 122/78   Pulse: 97   Temp: 98.6 °F (37 °C)     Weight: 72.8 kg (160 lb 7.9 oz)   Body mass index is 29.35 kg/m².      BP Readings from Last 3 Encounters:   04/05/22 122/78   03/08/22 130/64   02/28/22 (!) 147/92     Wt Readings from Last 3 Encounters:   04/05/22 1414 72.8 kg (160 lb 7.9 oz)   03/09/22 1420 74.8 kg (165 lb)   03/08/22 1109 74.9 kg (165 lb 2 oz)            Physical Exam  Constitutional:       General: She is not in acute distress.     Appearance: She is not ill-appearing.   HENT:      Mouth/Throat:      Mouth: Mucous membranes are moist.   Eyes:      General: No scleral icterus.  Cardiovascular:      Rate and Rhythm: Normal rate and regular rhythm.      Comments: Reproducible chest pressure to sternum/left chest wall.   Pulmonary:      Effort: Pulmonary effort is normal. No respiratory distress.      Breath sounds: Normal breath sounds. No wheezing, rhonchi or rales.   Chest:      Chest wall: Tenderness (reproducible sternal pain) present.   Abdominal:      General: Bowel sounds are normal. There is no distension.      Palpations: Abdomen is soft.      Tenderness: There is abdominal tenderness (mild epigastric). There is no guarding.   Musculoskeletal:         General: Swelling (1+ BLE) present.      Cervical back: Neck supple.   Skin:     General: Skin is warm and dry.   Neurological:      Mental Status: She is alert. Mental status is at baseline.      Gait: Gait normal.   Psychiatric:         Mood and Affect: Mood normal.         Behavior: Behavior normal.         Thought Content: Thought content normal.         Laboratory Reviewed: (Yes)  Lab Results    Component Value Date    WBC 3.55 (L) 03/08/2022    HGB 10.2 (L) 03/08/2022    HCT 32.2 (L) 03/08/2022     03/08/2022    CHOL 157 12/24/2021    TRIG 120 12/24/2021    HDL 42 12/24/2021    ALT 28 01/20/2022    AST 39 01/20/2022     02/08/2022    K 4.7 02/08/2022     02/08/2022    CREATININE 2.5 (H) 02/08/2022    BUN 30 (H) 02/08/2022    CO2 24 02/08/2022    TSH 5.158 (H) 12/24/2021    PSA <0.1 05/27/2008    INR 1.0 11/22/2021    HGBA1C 5.2 12/24/2021           Health Maintenance  Health Maintenance Topics with due status: Not Due       Topic Last Completion Date    Pneumococcal Vaccines (Age 65+) 10/22/2018    TETANUS VACCINE 09/09/2020    Colorectal Cancer Screening 02/25/2021    DEXA Scan 08/03/2021    Lipid Panel 12/24/2021    Mammogram 02/09/2022     Health Maintenance Due   Topic Date Due    COVID-19 Vaccine (3 - Moderna risk 4-dose series) 11/25/2021       Assessment:  Problem List Items Addressed This Visit        Cardiac/Vascular    Essential hypertension     Resume beta blocker ASAP.  BP Readings from Last 3 Encounters:   04/05/22 122/78   03/08/22 130/64   02/28/22 (!) 147/92                   Renal/    CKD (chronic kidney disease) stage 4, GFR 15-29 ml/min     Renal dose all meds.   BP control.   Continue f/u with nephrology.              GI    Gastroesophageal reflux disease without esophagitis (Chronic)     Now sx worse.   Rx Pepcid, continue pantoprazole, rx carafate.   If no improvement this week contact me.               Other    Other chest pain     APAP PRN, tx GERD sx.              Other Visit Diagnoses     Chest pain, unspecified type    -  Primary    Relevant Medications    sucralfate (CARAFATE) 1 gram tablet    atenoloL (TENORMIN) 50 MG tablet    Other Relevant Orders    IN OFFICE EKG 12-LEAD (to Muse)    X-Ray Chest PA And Lateral    Troponin I    CBC Auto Differential    Comprehensive Metabolic Panel    Gastroesophageal reflux disease with esophagitis, unspecified  whether hemorrhage        Relevant Medications    famotidine (PEPCID) 40 MG tablet        EKG reviewed and compared to EKG done 12/23/22. RBBB, no detrimental change appreciated.     Discussed with pt possible causes of sx including musculoskeletal pain since clearly reproducible, GERD sx worsening, respiratory infection or cardiac sx. Discussed risks/benefits of O/P mgmt vs ER visit today. Pt would like O/P work up and mgmt.       Plan:  Chest pain, unspecified type  -     IN OFFICE EKG 12-LEAD (to Muse)  -     X-Ray Chest PA And Lateral; Future; Expected date: 04/05/2022  -     Troponin I; Future; Expected date: 04/05/2022  -     CBC Auto Differential; Future; Expected date: 04/05/2022  -     Comprehensive Metabolic Panel; Future; Expected date: 04/05/2022  -     sucralfate (CARAFATE) 1 gram tablet; Take 1 tablet (1 g total) by mouth 4 (four) times daily before meals and nightly.  Dispense: 120 tablet; Refill: 0  -     atenoloL (TENORMIN) 50 MG tablet; Take 1 tablet (50 mg total) by mouth once daily.  Dispense: 30 tablet; Refill: 0    Gastroesophageal reflux disease with esophagitis, unspecified whether hemorrhage  -     famotidine (PEPCID) 40 MG tablet; Take 1 tablet (40 mg total) by mouth every other day.  Dispense: 15 tablet; Refill: 2    Gastroesophageal reflux disease without esophagitis    Other chest pain    Essential hypertension    CKD (chronic kidney disease) stage 4, GFR 15-29 ml/min      -Patient's lab results were reviewed and discussed with patient  -Treatment options and alternatives were discussed with the patient. Patient expressed understanding. Patient was given the opportunity to ask questions and be an active participant in their medical care. Patient had no further questions or concerns at this time.   -Documentation of patient's health and condition was obtained from family member who was present during visit.  -Patient is an overall moderate risk for health complications from their medical  conditions.       Follow up: Follow up in about 1 week (around 4/12/2022).      After visit summary printed and given to patient upon discharge.  Patient goals and care plan are included in After visit summary.    Total medical decision making time was 91 min.  The following issues were discussed: The primary encounter diagnosis was Chest pain, unspecified type. Diagnoses of Gastroesophageal reflux disease with esophagitis, unspecified whether hemorrhage, Gastroesophageal reflux disease without esophagitis, Other chest pain, Essential hypertension, and CKD (chronic kidney disease) stage 4, GFR 15-29 ml/min were also pertinent to this visit.    Health maintenance needs, recent test results and goals of care discussed with pt and questions answered.

## 2022-04-05 NOTE — TELEPHONE ENCOUNTER
Patient call returned. She stated that she was in the lobby & had just come form having radiation. Patient stated that she was tired, having SOB & chest pressure. She was advised that I would place her on our schedule to be seen today by Luz. Patient verbally understood the information.

## 2022-04-05 NOTE — ASSESSMENT & PLAN NOTE
Now sx worse.   Rx Pepcid, continue pantoprazole, rx carafate.   If no improvement this week contact me.

## 2022-04-05 NOTE — PATIENT INSTRUCTIONS
Take Atenolol short-term in place of carvedilol as discussed.     X-ray and lab today.     If you feel worse call EMS.     Let me know Thursday if symptoms do not improve.

## 2022-04-05 NOTE — TELEPHONE ENCOUNTER
----- Message from Jaja Marina sent at 4/5/2022  2:01 PM CDT -----  Pt would like return call; pt states she is needing to schedule an appt.   Please call back at .[phone]

## 2022-04-05 NOTE — Clinical Note
Good afternoon - I saw Ms Zuniga today for significant dyspepsia and reproducible CP with DONOHUE. Let me know if you have other recommendations given her current radiation tx. Thank you

## 2022-04-05 NOTE — ASSESSMENT & PLAN NOTE
Resume beta blocker ASAP.  BP Readings from Last 3 Encounters:   04/05/22 122/78   03/08/22 130/64   02/28/22 (!) 147/92

## 2022-04-06 ENCOUNTER — OFFICE VISIT (OUTPATIENT)
Dept: PRIMARY CARE CLINIC | Facility: CLINIC | Age: 67
End: 2022-04-06
Payer: MEDICARE

## 2022-04-06 ENCOUNTER — DOCUMENTATION ONLY (OUTPATIENT)
Dept: RADIATION ONCOLOGY | Facility: CLINIC | Age: 67
End: 2022-04-06
Payer: MEDICARE

## 2022-04-06 ENCOUNTER — HOSPITAL ENCOUNTER (EMERGENCY)
Facility: HOSPITAL | Age: 67
Discharge: HOME | End: 2022-04-06
Attending: EMERGENCY MEDICINE
Payer: MEDICARE

## 2022-04-06 VITALS
OXYGEN SATURATION: 99 % | SYSTOLIC BLOOD PRESSURE: 143 MMHG | RESPIRATION RATE: 20 BRPM | HEART RATE: 86 BPM | DIASTOLIC BLOOD PRESSURE: 75 MMHG | WEIGHT: 166 LBS | BODY MASS INDEX: 29.41 KG/M2 | HEIGHT: 63 IN | TEMPERATURE: 99 F

## 2022-04-06 VITALS
HEART RATE: 100 BPM | BODY MASS INDEX: 29.7 KG/M2 | WEIGHT: 161.38 LBS | RESPIRATION RATE: 28 BRPM | DIASTOLIC BLOOD PRESSURE: 80 MMHG | HEIGHT: 62 IN | TEMPERATURE: 97 F | OXYGEN SATURATION: 98 % | SYSTOLIC BLOOD PRESSURE: 118 MMHG

## 2022-04-06 DIAGNOSIS — R07.9 CHEST PAIN: Primary | ICD-10-CM

## 2022-04-06 DIAGNOSIS — R07.89 OTHER CHEST PAIN: ICD-10-CM

## 2022-04-06 DIAGNOSIS — R06.09 DOE (DYSPNEA ON EXERTION): Primary | ICD-10-CM

## 2022-04-06 DIAGNOSIS — Z85.3 HISTORY OF BREAST CANCER: ICD-10-CM

## 2022-04-06 DIAGNOSIS — R07.89 OTHER CHEST PAIN: Primary | ICD-10-CM

## 2022-04-06 DIAGNOSIS — I10 CHRONIC HYPERTENSION: ICD-10-CM

## 2022-04-06 DIAGNOSIS — N18.30 STAGE 3 CHRONIC KIDNEY DISEASE, UNSPECIFIED WHETHER STAGE 3A OR 3B CKD: ICD-10-CM

## 2022-04-06 LAB
ALBUMIN SERPL BCP-MCNC: 3.6 G/DL (ref 3.5–5.2)
ALP SERPL-CCNC: 106 U/L (ref 55–135)
ALT SERPL W/O P-5'-P-CCNC: 22 U/L (ref 10–44)
ANION GAP SERPL CALC-SCNC: 15 MMOL/L (ref 8–16)
AST SERPL-CCNC: 38 U/L (ref 10–40)
BASOPHILS # BLD AUTO: 0.02 K/UL (ref 0–0.2)
BASOPHILS NFR BLD: 0.6 % (ref 0–1.9)
BILIRUB SERPL-MCNC: 0.7 MG/DL (ref 0.1–1)
BNP SERPL-MCNC: 121 PG/ML (ref 0–99)
BUN SERPL-MCNC: 42 MG/DL (ref 8–23)
CALCIUM SERPL-MCNC: 9.5 MG/DL (ref 8.7–10.5)
CHLORIDE SERPL-SCNC: 100 MMOL/L (ref 95–110)
CO2 SERPL-SCNC: 22 MMOL/L (ref 23–29)
CREAT SERPL-MCNC: 2.6 MG/DL (ref 0.5–1.4)
DIFFERENTIAL METHOD: ABNORMAL
EOSINOPHIL # BLD AUTO: 0.2 K/UL (ref 0–0.5)
EOSINOPHIL NFR BLD: 5 % (ref 0–8)
ERYTHROCYTE [DISTWIDTH] IN BLOOD BY AUTOMATED COUNT: 15.1 % (ref 11.5–14.5)
EST. GFR  (AFRICAN AMERICAN): 21 ML/MIN/1.73 M^2
EST. GFR  (NON AFRICAN AMERICAN): 18 ML/MIN/1.73 M^2
GLUCOSE SERPL-MCNC: 105 MG/DL (ref 70–110)
HCT VFR BLD AUTO: 31.7 % (ref 37–48.5)
HGB BLD-MCNC: 10 G/DL (ref 12–16)
IMM GRANULOCYTES # BLD AUTO: 0.02 K/UL (ref 0–0.04)
IMM GRANULOCYTES NFR BLD AUTO: 0.6 % (ref 0–0.5)
LYMPHOCYTES # BLD AUTO: 0.4 K/UL (ref 1–4.8)
LYMPHOCYTES NFR BLD: 12.3 % (ref 18–48)
MCH RBC QN AUTO: 27.2 PG (ref 27–31)
MCHC RBC AUTO-ENTMCNC: 31.5 G/DL (ref 32–36)
MCV RBC AUTO: 86 FL (ref 82–98)
MONOCYTES # BLD AUTO: 0.5 K/UL (ref 0.3–1)
MONOCYTES NFR BLD: 14.8 % (ref 4–15)
NEUTROPHILS # BLD AUTO: 2.1 K/UL (ref 1.8–7.7)
NEUTROPHILS NFR BLD: 66.7 % (ref 38–73)
NRBC BLD-RTO: 0 /100 WBC
PLATELET # BLD AUTO: 188 K/UL (ref 150–450)
PMV BLD AUTO: 9.5 FL (ref 9.2–12.9)
POTASSIUM SERPL-SCNC: 4.3 MMOL/L (ref 3.5–5.1)
PROT SERPL-MCNC: 8.2 G/DL (ref 6–8.4)
RBC # BLD AUTO: 3.67 M/UL (ref 4–5.4)
SODIUM SERPL-SCNC: 137 MMOL/L (ref 136–145)
TROPONIN I SERPL DL<=0.01 NG/ML-MCNC: 0.05 NG/ML (ref 0–0.03)
WBC # BLD AUTO: 3.17 K/UL (ref 3.9–12.7)

## 2022-04-06 PROCEDURE — 3288F PR FALLS RISK ASSESSMENT DOCUMENTED: ICD-10-PCS | Mod: CPTII,S$GLB,, | Performed by: NURSE PRACTITIONER

## 2022-04-06 PROCEDURE — G6002 STEREOSCOPIC X-RAY GUIDANCE: HCPCS | Mod: 26,,, | Performed by: RADIOLOGY

## 2022-04-06 PROCEDURE — 84484 ASSAY OF TROPONIN QUANT: CPT | Performed by: EMERGENCY MEDICINE

## 2022-04-06 PROCEDURE — 1157F ADVNC CARE PLAN IN RCRD: CPT | Mod: CPTII,S$GLB,, | Performed by: NURSE PRACTITIONER

## 2022-04-06 PROCEDURE — 85025 COMPLETE CBC W/AUTO DIFF WBC: CPT | Performed by: EMERGENCY MEDICINE

## 2022-04-06 PROCEDURE — 99215 PR OFFICE/OUTPT VISIT, EST, LEVL V, 40-54 MIN: ICD-10-PCS | Mod: S$GLB,,, | Performed by: NURSE PRACTITIONER

## 2022-04-06 PROCEDURE — 99284 EMERGENCY DEPT VISIT MOD MDM: CPT | Mod: 25

## 2022-04-06 PROCEDURE — 3008F BODY MASS INDEX DOCD: CPT | Mod: CPTII,S$GLB,, | Performed by: NURSE PRACTITIONER

## 2022-04-06 PROCEDURE — 4010F PR ACE/ARB THEARPY RXD/TAKEN: ICD-10-PCS | Mod: CPTII,S$GLB,, | Performed by: NURSE PRACTITIONER

## 2022-04-06 PROCEDURE — 3074F SYST BP LT 130 MM HG: CPT | Mod: CPTII,S$GLB,, | Performed by: NURSE PRACTITIONER

## 2022-04-06 PROCEDURE — 1126F PR PAIN SEVERITY QUANTIFIED, NO PAIN PRESENT: ICD-10-PCS | Mod: CPTII,S$GLB,, | Performed by: NURSE PRACTITIONER

## 2022-04-06 PROCEDURE — 93010 EKG 12-LEAD: ICD-10-PCS | Mod: ,,, | Performed by: INTERNAL MEDICINE

## 2022-04-06 PROCEDURE — 99999 PR PBB SHADOW E&M-EST. PATIENT-LVL V: ICD-10-PCS | Mod: PBBFAC,,, | Performed by: NURSE PRACTITIONER

## 2022-04-06 PROCEDURE — 3288F FALL RISK ASSESSMENT DOCD: CPT | Mod: CPTII,S$GLB,, | Performed by: NURSE PRACTITIONER

## 2022-04-06 PROCEDURE — 3066F PR DOCUMENTATION OF TREATMENT FOR NEPHROPATHY: ICD-10-PCS | Mod: CPTII,S$GLB,, | Performed by: NURSE PRACTITIONER

## 2022-04-06 PROCEDURE — 1101F PR PT FALLS ASSESS DOC 0-1 FALLS W/OUT INJ PAST YR: ICD-10-PCS | Mod: CPTII,S$GLB,, | Performed by: NURSE PRACTITIONER

## 2022-04-06 PROCEDURE — 77387 GUIDANCE FOR RADJ TX DLVR: CPT | Mod: TC | Performed by: RADIOLOGY

## 2022-04-06 PROCEDURE — 25000003 PHARM REV CODE 250: Performed by: EMERGENCY MEDICINE

## 2022-04-06 PROCEDURE — 77417 THER RADIOLOGY PORT IMAGE(S): CPT | Performed by: RADIOLOGY

## 2022-04-06 PROCEDURE — 99999 PR PBB SHADOW E&M-EST. PATIENT-LVL V: CPT | Mod: PBBFAC,,, | Performed by: NURSE PRACTITIONER

## 2022-04-06 PROCEDURE — 80053 COMPREHEN METABOLIC PANEL: CPT | Performed by: EMERGENCY MEDICINE

## 2022-04-06 PROCEDURE — 83880 ASSAY OF NATRIURETIC PEPTIDE: CPT | Performed by: EMERGENCY MEDICINE

## 2022-04-06 PROCEDURE — 3079F PR MOST RECENT DIASTOLIC BLOOD PRESSURE 80-89 MM HG: ICD-10-PCS | Mod: CPTII,S$GLB,, | Performed by: NURSE PRACTITIONER

## 2022-04-06 PROCEDURE — 93005 ELECTROCARDIOGRAM TRACING: CPT

## 2022-04-06 PROCEDURE — 3008F PR BODY MASS INDEX (BMI) DOCUMENTED: ICD-10-PCS | Mod: CPTII,S$GLB,, | Performed by: NURSE PRACTITIONER

## 2022-04-06 PROCEDURE — 36000 PLACE NEEDLE IN VEIN: CPT

## 2022-04-06 PROCEDURE — 77412 RADIATION TX DELIVERY LVL 3: CPT | Performed by: RADIOLOGY

## 2022-04-06 PROCEDURE — 3066F NEPHROPATHY DOC TX: CPT | Mod: CPTII,S$GLB,, | Performed by: NURSE PRACTITIONER

## 2022-04-06 PROCEDURE — 93010 ELECTROCARDIOGRAM REPORT: CPT | Mod: ,,, | Performed by: INTERNAL MEDICINE

## 2022-04-06 PROCEDURE — 1126F AMNT PAIN NOTED NONE PRSNT: CPT | Mod: CPTII,S$GLB,, | Performed by: NURSE PRACTITIONER

## 2022-04-06 PROCEDURE — 99215 OFFICE O/P EST HI 40 MIN: CPT | Mod: S$GLB,,, | Performed by: NURSE PRACTITIONER

## 2022-04-06 PROCEDURE — 4010F ACE/ARB THERAPY RXD/TAKEN: CPT | Mod: CPTII,S$GLB,, | Performed by: NURSE PRACTITIONER

## 2022-04-06 PROCEDURE — 3074F PR MOST RECENT SYSTOLIC BLOOD PRESSURE < 130 MM HG: ICD-10-PCS | Mod: CPTII,S$GLB,, | Performed by: NURSE PRACTITIONER

## 2022-04-06 PROCEDURE — G6002 PR STEREOSCOPIC XRAY GUIDE FOR RADIATION TX DELIV: ICD-10-PCS | Mod: 26,,, | Performed by: RADIOLOGY

## 2022-04-06 PROCEDURE — 1157F PR ADVANCE CARE PLAN OR EQUIV PRESENT IN MEDICAL RECORD: ICD-10-PCS | Mod: CPTII,S$GLB,, | Performed by: NURSE PRACTITIONER

## 2022-04-06 PROCEDURE — 3079F DIAST BP 80-89 MM HG: CPT | Mod: CPTII,S$GLB,, | Performed by: NURSE PRACTITIONER

## 2022-04-06 PROCEDURE — 1101F PT FALLS ASSESS-DOCD LE1/YR: CPT | Mod: CPTII,S$GLB,, | Performed by: NURSE PRACTITIONER

## 2022-04-06 RX ADMIN — NITROGLYCERIN 1 INCH: 20 OINTMENT TOPICAL at 12:04

## 2022-04-06 NOTE — PLAN OF CARE
Day 10 of outpatient xrt to the breast. D/c from ER today for chest pain and SOB; feeling fatigued, no skin irritation, using Miaderm Cream. Will continue to monitor.

## 2022-04-06 NOTE — PROGRESS NOTES
"Nadia Damon  04/06/2022  [id number]    Luz Dickson NP  Patient Care Team:  Luz Dickson NP as PCP - General (Family Medicine)  Miguel Soni Jr., MD as Consulting Physician (Vascular Surgery)  Ike King MD as Consulting Physician (Cardiology)  Courtney Tubbs MD as Consulting Physician (Cardiology)  Carter Crawford MD as Consulting Physician (Nephrology)  Paxton Vasques OD as Consulting Physician (Optometry)  PRANAY Villalobos MD as Obstetrician (Obstetrics)  Parker Mccarthy IV, MD (Urology)  Ike King MD as Consulting Physician (Cardiology)  Jose Roland MD as Consulting Physician (Dermatology)  Prasanth Johnson MD as Consulting Physician (Rheumatology)  Ayanna Hart RN as Oncology Navigator  Nora Morin LMSW as         [facility] Primary Care Note      Chief Complaint:  Chief Complaint   Patient presents with    Recheck: on labs (F/U)       History of Present Illness:  HPI    Increased dyspnea today. Very fatigued today.  Had XRT yesterday. Now increased soreness left breast.     Started Carafate last night, feels like dyspepsia has improved.     Doesn't want to go to ER today despite explained risks/benefits.     Limited family in town.     1109 am - Feels tightness across chest now. 7/10. Not reproducible  1116 am - EMS here. No change in chest tightness. "I feel terrible."      Review of Systems   Constitutional: Negative for chills and fever.   Respiratory: Positive for shortness of breath. Negative for cough and wheezing.    Cardiovascular: Positive for chest pain. Negative for leg swelling.   Genitourinary: Negative for dysuria.   Musculoskeletal: Negative for joint pain and myalgias.   Neurological: Negative for dizziness.         The following were reviewed: Active problem list, medication list, allergies, family history, social history, and Health Maintenance.     History:  Past Medical History:   Diagnosis Date    Abdominal wall hernia     CT Renal " 6/11/2018---Small fat containing superior ventral abdominal wall hernia at the epicardial pacing lead site.    Anxiety     Arthritis     ZEN HIPS    Breast cancer in female 08/2021    LEFT BREAST    Cellulitis of axilla, left 12/23/2021    Chronic diastolic heart failure 12/16/2021    Chronic kidney disease     stage 4, GFR 15-29 ml/min    Chronic midline low back pain without sciatica 6/18/2018    Coronary artery disease 1993    heart transplant    Depression     Fibromyalgia     on Lyrica    Heart failure     native heart cardiomyopathy    Heart transplanted 1993    due to cardiomyopathy    History of hyperparathyroidism; Hyperparathyroidism, secondary renal     PT DENIES    Hypertension     Immune disorder     anti rejection meds    Iron deficiency anemia 8/15/2017    Kidney stones     passed per pt    Obesity     Other osteoporosis without current pathological fracture 8/30/2019    Shingles 2003 approx    left leg    Trouble in sleeping     Urinary incontinence      Past Surgical History:   Procedure Laterality Date    BLADDER SURGERY  2015 approx    mesh - Dr Everett then 2nd reconstructive sx Dr Onofre    BREAST BIOPSY Bilateral     NEGATIVE    BREAST SURGERY Left 9/28/15    Bx - benign    BREAST SURGERY Right 12/2015    Bx benign    CARDIAC PACEMAKER REMOVAL Left 06/26/2014    Pacer defirillator removed. Put in 1993 aat time of heart transplant    CARPAL TUNNEL RELEASE Left 03/03/15    Dr. Hall    COLONOSCOPY N/A 2/25/2021    Procedure: COLONOSCOPY;  Surgeon: Freida Ramirez MD;  Location: [facility] ENDO;  Service: Endoscopy;  Laterality: N/A;    HEART TRANSPLANT  1993    HERNIA REPAIR Right 1971 approx    Inguinal    HYSTERECTOMY  1983    vag hyst /LSO     INCISION AND DRAINAGE OF ABSCESS Left 12/24/2021    Procedure: INCISION AND DRAINAGE, ABSCESS;  Surgeon: Joseph Longo MD;  Location: [facility] OR;  Service: General;  Laterality: Left;    INSERTION OF TUNNELED CENTRAL  VENOUS CATHETER (CVC) WITH SUBCUTANEOUS PORT N/A 2021    Procedure: [id number];  Surgeon: Christoph Douglas MD;  Location: [facility] OR;  Service: General;  Laterality: N/A;    REMOVAL OF VASCULAR ACCESS PORT      SENTINEL LYMPH NODE BIOPSY Left 10/12/2021    Procedure: BIOPSY, LYMPH NODE, SENTINEL;  Surgeon: Christoph Douglas MD;  Location: [facility] OR;  Service: General;  Laterality: Left;    TOE SURGERY       Family History   Problem Relation Age of Onset    Cancer Mother 38        breast    Breast cancer Mother     Heart disease Maternal Grandmother     Cataracts Cousin     Hypertension Son     Diabetes Neg Hx     Stroke Neg Hx     Kidney disease Neg Hx     Asthma Neg Hx     COPD Neg Hx     Melanoma Neg Hx     Hyperlipidemia Neg Hx      Social History     Socioeconomic History    Marital status: Single    Number of children: 2    Highest education level: 11th grade   Occupational History    Occupation: Retired   Tobacco Use    Smoking status: Never Smoker    Smokeless tobacco: Never Used   Substance and Sexual Activity    Alcohol use: Never     Alcohol/week: 0.0 standard drinks    Drug use: No    Sexual activity: Not Currently     Partners: Male     Birth control/protection: See Surgical Hx   Other Topics Concern    Are you pregnant or think you may be? No    Breast-feeding No   Social History Narrative    Single. 2 children , 1  at 30 yoa   strep throat -  pneumonia and renal complications after not completing course of AB. Other child lives in [place], [place]. Has a cousin locally that could help in an emergency. 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 Caffeine intake =- 1 cola a day. No coffee, + occasional tea, avoids caffeine especially at night. Still drives. She does not have a Living Will or Advanced directive.      Social Determinants of Health     Financial Resource Strain: Low Risk     Difficulty of Paying Living Expenses: Not hard at  all   Food Insecurity: No Food Insecurity    Worried About Running Out of Food in the Last Year: Never true    Ran Out of Food in the Last Year: Never true   Transportation Needs: No Transportation Needs    Lack of Transportation (Medical): No    Lack of Transportation (Non-Medical): No   Physical Activity: Insufficiently Active    Days of Exercise per Week: 2 days    Minutes of Exercise per Session: 10 min   Stress: No Stress Concern Present    Feeling of Stress : Not at all   Social Connections: Socially Isolated    Frequency of Communication with Friends and Family: Once a week    Frequency of Social Gatherings with Friends and Family: Once a week    Attends Zoroastrianism Services: More than 4 times per year    Active Member of Clubs or Organizations: No    Attends Club or Organization Meetings: Never    Marital Status: Never    Housing Stability: Low Risk     Unable to Pay for Housing in the Last Year: No    Number of Places Lived in the Last Year: 1    Unstable Housing in the Last Year: No     Patient Active Problem List   Diagnosis    Heart transplanted    Anxiety    Insomnia    CKD (chronic kidney disease) stage 4, GFR 15-29 ml/min    Immunosuppression due to drug therapy    Fibromyalgia    Gastroesophageal reflux disease without esophagitis    Breast screening    Immunization deficiency    Essential hypertension    Aortic atherosclerosis    Chronic major depressive disorder, recurrent episode    Iron deficiency anemia    Vaginal atrophy    Hyperparathyroidism    Hx of falling    Chronic midline low back pain without sciatica    Closed nondisplaced fracture of distal phalanx of left great toe with routine healing    Lightheadedness    Medication side effects    Obesity (BMI 30.0-34.9)    Edema    Asymptomatic menopausal state     Other osteoporosis without current pathological fracture    Cough    Abnormal CT scan, kidney    Weakness of both lower extremities     Immunocompromised patient    Osteoporosis, post-menopausal    Ductal carcinoma in situ (DCIS) of left breast    Immunodeficiency secondary to radiation therapy    Abnormal mammogram    The hangs, uncomplicated    Severe sepsis    GRACE (acute kidney injury)    Diarrhea    Thrombocytopenia, unspecified    Immunodeficiency due to chemotherapy    C. difficile colitis    Strain of left shoulder    Left shoulder pain    Ulnar neuropathy of left upper extremity    Anemia associated with stage 4 chronic renal failure    Secondary and unspecified malignant neoplasm of axilla and upper limb lymph nodes    Other chest pain     Review of patient's allergies indicates:   Allergen Reactions    Lisinopril Swelling and Rash    Augmentin [amoxicillin-pot clavulanate] Diarrhea       Medications:  Current Facility-Administered Medications on File Prior to Visit   Medication Dose Route Frequency Provider Last Rate Last Admin    denosumab (PROLIA) injection 60 mg  60 mg Subcutaneous Q6 Months Prasanth Johnson MD        lactated ringers infusion   Intravenous Continuous Jennifer Carr MD 10 mL/hr at 11/09/21 0717 Restarted at 11/09/21 0820     Current Outpatient Medications on File Prior to Visit   Medication Sig Dispense Refill    acetaminophen (TYLENOL) 325 MG tablet Take 1 tablet (325 mg total) by mouth every 6 (six) hours as needed for Pain.  0    amLODIPine (NORVASC) 2.5 MG tablet       aspirin (ECOTRIN) 81 MG EC tablet Take 1 tablet (81 mg total) by mouth once daily. 90 tablet 3    atenoloL (TENORMIN) 50 MG tablet Take 1 tablet (50 mg total) by mouth once daily. 30 tablet 0    biotin 10,000 mcg Cap Take 1 tablet by mouth once daily.      busPIRone (BUSPAR) 10 MG tablet Take 1 tablet (10 mg total) by mouth once daily. 90 tablet 3    calcitonin, salmon, (FORTICAL) 200 unit/actuation nasal spray 1 spray by Nasal route once daily. 3.7 mL 3    carvediloL (COREG) 6.25 MG tablet Take 1 tablet (6.25 mg total) by  mouth 2 (two) times daily. 180 tablet 3    cloNIDine (CATAPRES) 0.1 MG tablet Take 1 tablet (0.1 mg total) by mouth nightly as needed (BP >150/95). 90 tablet 3    cycloSPORINE modified, NEORAL, (NEORAL) 25 MG capsule Take 3 capsules (75 mg total) by mouth 2 (two) times daily. 270 capsule 11    DULoxetine (CYMBALTA) 30 MG capsule TAKE 1 CAPSULE EVERY DAY 90 capsule 1    EVENING PRIMROSE OIL ORAL Take 1,000 mg by mouth once daily.      famotidine (PEPCID) 40 MG tablet Take 1 tablet (40 mg total) by mouth every other day. 15 tablet 2    furosemide (LASIX) 20 MG tablet Take 1 tablet (20 mg total) by mouth 2 (two) times a day for 5 days, THEN 1 tablet (20 mg total) once daily. Take 1 tablet daily. 370 tablet 0    hydrALAZINE (APRESOLINE) 50 MG tablet TAKE 2 TABLETS  EVERY 8  HOURS. 540 tablet 3    losartan (COZAAR) 25 MG tablet Take 1 tablet (25 mg total) by mouth once daily. 90 tablet 3    multivitamin capsule Take 1 capsule by mouth once daily.      ondansetron (ZOFRAN-ODT) 8 MG TbDL Take 1 tablet (8 mg total) by mouth every 8 (eight) hours as needed (nausea). 60 tablet 5    pantoprazole (PROTONIX) 40 MG tablet Take 1 tablet (40 mg total) by mouth once daily. 90 tablet 1    pregabalin (LYRICA) 50 MG capsule Take 1 capsule (50 mg total) by mouth 2 (two) times daily. NOON & NIGHT TIME 180 capsule 1    sucralfate (CARAFATE) 1 gram tablet Take 1 tablet (1 g total) by mouth 4 (four) times daily before meals and nightly. 120 tablet 0    temazepam (RESTORIL) 30 mg capsule Take 1 at bedtime 90 capsule 1    vitamin E 400 UNIT capsule Take 400 Units by mouth once daily.      zolpidem (AMBIEN) 10 mg Tab TAKE 1 TABLET BY MOUTH ONCE DAILY IN THE EVENING 90 tablet 1       Medications have been reviewed and reconciled with patient at visit today.    Barriers to medications present (no )    Adverse reactions to current medications (no)      Exam:  Vitals:    04/06/22 1108   BP: 118/80   Pulse: 100   Resp: (!) 28    Temp:      Weight: 73.2 kg (161 lb 6 oz)   Body mass index is 29.52 kg/m².      BP Readings from Last 3 Encounters:   04/06/22 118/80   04/06/22 (!) 143/78   04/05/22 122/78     Wt Readings from Last 3 Encounters:   04/06/22 1024 73.2 kg (161 lb 6 oz)   04/06/22 1153 75.3 kg (166 lb 0.1 oz)   04/05/22 1414 72.8 kg (160 lb 7.9 oz)            Physical Exam  Constitutional:       General: She is not in acute distress (moderate dyspnea at rest. ).  HENT:      Nose: Nose normal.      Mouth/Throat:      Mouth: Mucous membranes are moist.   Eyes:      General: No scleral icterus.  Cardiovascular:      Rate and Rhythm: Normal rate and regular rhythm.      Heart sounds: Normal heart sounds.   Pulmonary:      Effort: Respiratory distress (tachypnea) present.      Breath sounds: Normal breath sounds.   Abdominal:      General: Bowel sounds are normal.      Palpations: Abdomen is soft.   Musculoskeletal:         General: No swelling.   Skin:     General: Skin is warm.   Neurological:      Mental Status: She is alert and oriented to person, place, and time. Mental status is at baseline.   Psychiatric:         Mood and Affect: Mood normal.         Behavior: Behavior normal.         Thought Content: Thought content normal.         Laboratory Reviewed: (Yes)  Lab Results   Component Value Date    WBC 3.17 (L) 04/06/2022    HGB 10.0 (L) 04/06/2022    HCT 31.7 (L) 04/06/2022     04/06/2022    CHOL 157 12/24/2021    TRIG 120 12/24/2021    HDL 42 12/24/2021    ALT 22 04/06/2022    AST 38 04/06/2022     04/06/2022    K 4.3 04/06/2022     04/06/2022    CREATININE 2.6 (H) 04/06/2022    BUN 42 (H) 04/06/2022    CO2 22 (L) 04/06/2022    TSH 5.158 (H) 12/24/2021    PSA <0.1 05/27/2008    INR 1.0 11/22/2021    HGBA1C 5.2 12/24/2021           Health Maintenance  Health Maintenance Topics with due status: Not Due       Topic Last Completion Date    Pneumococcal Vaccines (Age 65+) 10/22/2018    TETANUS VACCINE 09/09/2020     Colorectal Cancer Screening 02/25/2021    DEXA Scan 08/03/2021    Lipid Panel 12/24/2021    Mammogram 02/09/2022     Health Maintenance Due   Topic Date Due    COVID-19 Vaccine (3 - Moderna risk 4-dose series) 11/25/2021       Assessment:  Problem List Items Addressed This Visit        Other    Other chest pain     NTG given.  No relief in sx.   Report phoned to [facility] at ER.  To ER via EMS.              Other Visit Diagnoses     DONOHUE (dyspnea on exertion)    -  Primary    Relevant Orders    Refer to Emergency Dept.            Plan:  DONOHUE (dyspnea on exertion)  -     Refer to Emergency Dept.    Other chest pain      -Patient's lab results were reviewed and discussed with patient  -Treatment options and alternatives were discussed with the patient. Patient expressed understanding. Patient was given the opportunity to ask questions and be an active participant in their medical care. Patient had no further questions or concerns at this time.   -Documentation of patient's health and condition was obtained from family member who was present during visit.  -Patient is an overall moderate risk for health complications from their medical conditions.       Follow up: Follow up after ER visit/hospitalization..      After visit summary printed and given to patient upon discharge.  Patient goals and care plan are included in After visit summary.    Total medical decision making time was 47 min.  The following issues were discussed: The primary encounter diagnosis was DONOHUE (dyspnea on exertion). A diagnosis of Other chest pain was also pertinent to this visit.    Health maintenance needs, recent test results and goals of care discussed with pt and questions answered.

## 2022-04-06 NOTE — ED PROVIDER NOTES
SCRIBE #1 NOTE: I, Carlos Abraham, am scribing for, and in the presence of, Leana Deshpande MD. I have scribed the entire note.      History      Chief Complaint   Patient presents with    Shortness of Breath    Chest Pain     Pt presents from next door after reporting SOB, and chest pressure w/ exertion pt is a heart transplant and breast cancer patient currently receiving radiation daily.       Review of patient's allergies indicates:   Allergen Reactions    Lisinopril Swelling and Rash    Augmentin [amoxicillin-pot clavulanate] Diarrhea        HPI   HPI    4/6/2022, 12:03 PM   History obtained from the patient      History of Present Illness: Nadia Damon is a 66 y.o. female patient with a pMHx of breast cancer, CAD s/p heart transplant in 1993 who presents to the Emergency Department for chest pain, onset 1 day PTA. Pt describes the pain as a pressure. Symptoms are intermittent and moderate in severity. No mitigating or exacerbating factors reported. Associated sxs include SOB and cough. Patient denies any fever, chills, n/v/d, weakness, numbness, dizziness, headache, and all other sxs at this time. Pt is currently on radiation therapy, and had a chest X-ray done yesterday. No further complaints or concerns at this time.     Arrival mode: EMS    PCP: Luz Dickson NP       Past Medical History:  Past Medical History:   Diagnosis Date    Abdominal wall hernia     CT Renal 6/11/2018---Small fat containing superior ventral abdominal wall hernia at the epicardial pacing lead site.    Anxiety     Arthritis     ZEN HIPS    Breast cancer in female 08/2021    LEFT BREAST    Cellulitis of axilla, left 12/23/2021    Chronic diastolic heart failure 12/16/2021    Chronic kidney disease     stage 4, GFR 15-29 ml/min    Chronic midline low back pain without sciatica 6/18/2018    Coronary artery disease 1993    heart transplant    Depression     Fibromyalgia     on Lyrica    Heart failure     native heart  cardiomyopathy    Heart transplanted 1993    due to cardiomyopathy    History of hyperparathyroidism; Hyperparathyroidism, secondary renal     PT DENIES    Hypertension     Immune disorder     anti rejection meds    Iron deficiency anemia 8/15/2017    Kidney stones     passed per pt    Obesity     Other osteoporosis without current pathological fracture 8/30/2019    Shingles 2003 approx    left leg    Trouble in sleeping     Urinary incontinence        Past Surgical History:  Past Surgical History:   Procedure Laterality Date    BLADDER SURGERY  2015 approx    mesh - Dr Everett then 2nd reconstructive sx Dr Onofre    BREAST BIOPSY Bilateral     NEGATIVE    BREAST SURGERY Left 9/28/15    Bx - benign    BREAST SURGERY Right 12/2015    Bx benign    CARDIAC PACEMAKER REMOVAL Left 06/26/2014    Pacer defirillator removed. Put in 1993 aat time of heart transplant    CARPAL TUNNEL RELEASE Left 03/03/15    Dr. Hall    COLONOSCOPY N/A 2/25/2021    Procedure: COLONOSCOPY;  Surgeon: Freida Ramirez MD;  Location: [facility];  Service: Endoscopy;  Laterality: N/A;    HEART TRANSPLANT  1993    HERNIA REPAIR Right 1971 approx    Inguinal    HYSTERECTOMY  1983    vag hyst /LSO     INCISION AND DRAINAGE OF ABSCESS Left 12/24/2021    Procedure: INCISION AND DRAINAGE, ABSCESS;  Surgeon: Joseph Longo MD;  Location: [facility];  Service: General;  Laterality: Left;    INSERTION OF TUNNELED CENTRAL VENOUS CATHETER (CVC) WITH SUBCUTANEOUS PORT N/A 11/9/2021    Procedure: [id number];  Surgeon: Christoph Douglas MD;  Location: [facility] OR;  Service: General;  Laterality: N/A;    REMOVAL OF VASCULAR ACCESS PORT      SENTINEL LYMPH NODE BIOPSY Left 10/12/2021    Procedure: BIOPSY, LYMPH NODE, SENTINEL;  Surgeon: Christoph Douglas MD;  Location: [facility] OR;  Service: General;  Laterality: Left;    TOE SURGERY           Family History:  Family History   Problem Relation Age of Onset    Cancer Mother 38         breast    Breast cancer Mother     Heart disease Maternal Grandmother     Cataracts Cousin     Hypertension Son     Diabetes Neg Hx     Stroke Neg Hx     Kidney disease Neg Hx     Asthma Neg Hx     COPD Neg Hx     Melanoma Neg Hx     Hyperlipidemia Neg Hx        Social History:  Social History     Tobacco Use    Smoking status: Never Smoker    Smokeless tobacco: Never Used   Substance and Sexual Activity    Alcohol use: Never     Alcohol/week: 0.0 standard drinks    Drug use: No    Sexual activity: Not Currently     Partners: Male     Birth control/protection: See Surgical Hx       ROS   Review of Systems   Constitutional: Negative for chills and fever.   HENT: Negative for sore throat.    Respiratory: Positive for cough and shortness of breath.    Cardiovascular: Positive for chest pain (intermittent pressure).   Gastrointestinal: Negative for diarrhea, nausea and vomiting.   Genitourinary: Negative for dysuria.   Musculoskeletal: Negative for back pain.   Skin: Negative for rash.   Neurological: Negative for dizziness, weakness, light-headedness, numbness and headaches.   Hematological: Does not bruise/bleed easily.   All other systems reviewed and are negative.    Physical Exam      Initial Vitals [04/06/22 1153]   BP Pulse Resp Temp SpO2   120/78 90 20 98.2 °F (36.8 °C) 97 %      MAP       --          Physical Exam  Nursing Notes and Vital Signs Reviewed.  Constitutional: Patient is in no acute distress. Well-developed and well-nourished.  Head: Atraumatic. Normocephalic.  Eyes: PERRL. EOM intact. Conjunctivae are not pale. No scleral icterus.  ENT: Mucous membranes are moist. Oropharynx is clear and symmetric.    Neck: Supple. Full ROM.   Cardiovascular: Regular rate. Regular rhythm. No murmurs, rubs, or gallops. Distal pulses are 2+ and symmetric.  Pulmonary/Chest: No respiratory distress. Clear to auscultation bilaterally. No wheezing or rales.  Abdominal: Soft and non-distended.  There is no  "tenderness.  No rebound, guarding, or rigidity.   Musculoskeletal: Moves all extremities. No obvious deformities. No edema.  Skin: Warm and dry.  Neurological:  Alert, awake, and appropriate.  Normal speech.  No acute focal neurological deficits are appreciated.  Psychiatric: Normal affect. Good eye contact. Appropriate in content.    ED Course    External Jugular IV    Date/Time: 2022 12:06 PM  Performed by: Leana Deshpande MD  Authorized by: Leana Deshpande MD   Consent Done: Yes  Consent: Verbal consent obtained.  Risks and benefits: risks, benefits and alternatives were discussed  Consent given by: patient  Patient understanding: patient states understanding of the procedure being performed  Patient consent: the patient's understanding of the procedure matches consent given  Patient identity confirmed:  and verbally with patient  Location (Ext Jugular): Right.  Area Prepped With: Chlorohexidine.  Catheter Size: 18 ga.  Catheter Type: Jelco.  Number of attempts: 1  Fixation/Dressing: Tegaderm.  Patient tolerance: Patient tolerated the procedure well with no immediate complications        ED Vital Signs:  Vitals:    22 1153 22 1156 22 1159 22 1240   BP: 120/78 (!) 143/78  139/75   Pulse: 90 89 88 88   Resp: 20   20   Temp: 98.2 °F (36.8 °C)   98.8 °F (37.1 °C)   TempSrc: Oral   Oral   SpO2: 97% 99%  99%   Weight: 75.3 kg (166 lb 0.1 oz)      Height: 5' 3" (1.6 m)       22 1315   BP: (!) 143/75   Pulse: 86   Resp: 20   Temp: 98.5 °F (36.9 °C)   TempSrc: Oral   SpO2: 99%   Weight:    Height:        Abnormal Lab Results:  Labs Reviewed   CBC W/ AUTO DIFFERENTIAL - Abnormal; Notable for the following components:       Result Value    WBC 3.17 (*)     RBC 3.67 (*)     Hemoglobin 10.0 (*)     Hematocrit 31.7 (*)     MCHC 31.5 (*)     RDW 15.1 (*)     Immature Granulocytes 0.6 (*)     Lymph # 0.4 (*)     Lymph % 12.3 (*)     All other components within normal limits   COMPREHENSIVE " METABOLIC PANEL - Abnormal; Notable for the following components:    CO2 22 (*)     BUN 42 (*)     Creatinine 2.6 (*)     eGFR if  21 (*)     eGFR if non  18 (*)     All other components within normal limits   TROPONIN I - Abnormal; Notable for the following components:    Troponin I 0.047 (*)     All other components within normal limits   B-TYPE NATRIURETIC PEPTIDE - Abnormal; Notable for the following components:     (*)     All other components within normal limits        All Lab Results:  Results for orders placed or performed during the hospital encounter of 04/06/22   CBC auto differential   Result Value Ref Range    WBC 3.17 (L) 3.90 - 12.70 K/uL    RBC 3.67 (L) 4.00 - 5.40 M/uL    Hemoglobin 10.0 (L) 12.0 - 16.0 g/dL    Hematocrit 31.7 (L) 37.0 - 48.5 %    MCV 86 82 - 98 fL    MCH 27.2 27.0 - 31.0 pg    MCHC 31.5 (L) 32.0 - 36.0 g/dL    RDW 15.1 (H) 11.5 - 14.5 %    Platelets 188 150 - 450 K/uL    MPV 9.5 9.2 - 12.9 fL    Immature Granulocytes 0.6 (H) 0.0 - 0.5 %    Gran # (ANC) 2.1 1.8 - 7.7 K/uL    Immature Grans (Abs) 0.02 0.00 - 0.04 K/uL    Lymph # 0.4 (L) 1.0 - 4.8 K/uL    Mono # 0.5 0.3 - 1.0 K/uL    Eos # 0.2 0.0 - 0.5 K/uL    Baso # 0.02 0.00 - 0.20 K/uL    nRBC 0 0 /100 WBC    Gran % 66.7 38.0 - 73.0 %    Lymph % 12.3 (L) 18.0 - 48.0 %    Mono % 14.8 4.0 - 15.0 %    Eosinophil % 5.0 0.0 - 8.0 %    Basophil % 0.6 0.0 - 1.9 %    Differential Method Automated    Comprehensive metabolic panel   Result Value Ref Range    Sodium 137 136 - 145 mmol/L    Potassium 4.3 3.5 - 5.1 mmol/L    Chloride 100 95 - 110 mmol/L    CO2 22 (L) 23 - 29 mmol/L    Glucose 105 70 - 110 mg/dL    BUN 42 (H) 8 - 23 mg/dL    Creatinine 2.6 (H) 0.5 - 1.4 mg/dL    Calcium 9.5 8.7 - 10.5 mg/dL    Total Protein 8.2 6.0 - 8.4 g/dL    Albumin 3.6 3.5 - 5.2 g/dL    Total Bilirubin 0.7 0.1 - 1.0 mg/dL    Alkaline Phosphatase 106 55 - 135 U/L    AST 38 10 - 40 U/L    ALT 22 10 - 44 U/L    Anion  Gap 15 8 - 16 mmol/L    eGFR if African American 21 (A) >60 mL/min/1.73 m^2    eGFR if non African American 18 (A) >60 mL/min/1.73 m^2   Troponin I #1   Result Value Ref Range    Troponin I 0.047 (H) 0.000 - 0.026 ng/mL   BNP   Result Value Ref Range     (H) 0 - 99 pg/mL     *Note: Due to a large number of results and/or encounters for the requested time period, some results have not been displayed. A complete set of results can be found in Results Review.     Imaging Results:  Imaging Results    None        The EKG was ordered, reviewed, and independently interpreted by the ED provider.  Interpretation time: 11:52  Rate: 91 BPM  Rhythm: normal sinus rhythm  Interpretation: Possible left atrial enlargement. RBBB. LVH. No STEMI.  When compared to EKG performed on 4/5/22, there are no significant changes.    Outpatient Imaging Reviewed:    X-Ray Chest PA And Lateral  Order: [id number]   Status: Final result     Visible to patient: Yes (not seen)     Next appt: 04/08/2022 at 10:20 AM in Lab ([facility])     Dx: Chest pain, unspecified type     0 Result Notes    Details    Reading Physician Reading Date Result Priority   Wilman Higgins DO  [phone] 4/5/2022 Routine     Narrative & Impression  EXAMINATION:  XR CHEST PA AND LATERAL     CLINICAL HISTORY:  Chest pain, unspecified     TECHNIQUE:  PA and lateral views of the chest were performed.     COMPARISON:  12/23/2021     FINDINGS:  The lungs are clear and free of infiltrate.  No pleural effusion or pneumothorax. The heart is borderline enlarged.  There is evidence for prior median sternotomy.  There is calcification of the aortic knob with tortuosity of the descending thoracic aorta.     Impression:     1.  No acute cardiopulmonary process.        Electronically signed by: Wilman Higgins DO  Date:                                            04/05/2022  Time:                                           16:02                           The Emergency  Provider reviewed the vital signs and test results, which are outlined above.    ED Discussion     12:55 PM: Reassessed pt at this time. Discussed with pt all pertinent ED information and results. Discussed pt dx and plan of tx. Gave pt all f/u and return to the ED instructions. Advised pt to follow up outpatient with her Cardiologist, Dr. Lopez. All questions and concerns were addressed at this time. Pt expresses understanding of information and instructions, and is comfortable with plan to discharge. Pt is stable for discharge.    I discussed with patient and/or family/caretaker that evaluation in the ED does not suggest any emergent or life threatening medical conditions requiring immediate intervention beyond what was provided in the ED, and I believe patient is safe for discharge.  Regardless, an unremarkable evaluation in the ED does not preclude the development or presence of a serious of life threatening condition. As such, patient was instructed to return immediately for any worsening or change in current symptoms.         ED Medication(s):  Medications   nitroGLYCERIN 2% TD oint ointment 1 inch (1 inch Topical (Top) Given 4/6/22 1213)        Follow-up Information     Ike King MD. Schedule an appointment as soon as possible for a visit in 1 day.    Specialty: Cardiology  Why: Return to the Emergency Room, If symptoms worsen  Contact information:  [address] [facility] [address] [phone]                        Discharge Medication List as of 4/6/2022 12:56 PM            Medical Decision Making    Medical Decision Making:   Clinical Tests:   Lab Tests: Ordered and Reviewed  Radiological Study: Reviewed  Medical Tests: Ordered and Reviewed           Scribe Attestation:   Scribe #1: I performed the above scribed service and the documentation accurately describes the services I performed. I attest to the accuracy of the note.    Attending:   Physician Attestation Statement for Scribe #1: I,  Leana Deshpande MD, personally performed the services described in this documentation, as scribed by Carlos Abraham, in my presence, and it is both accurate and complete.          Clinical Impression       ICD-10-CM ICD-9-CM   1. Chest pain  R07.9 786.50   2. Chronic hypertension  I10 401.9   3. History of breast cancer  Z85.3 V10.3   4. Stage 3 chronic kidney disease, unspecified whether stage 3a or 3b CKD  N18.30 585.3       Disposition:   Disposition: Discharged  Condition: Stable         Leana Deshpande MD  04/06/22 9641

## 2022-04-07 PROCEDURE — 77417 THER RADIOLOGY PORT IMAGE(S): CPT | Performed by: RADIOLOGY

## 2022-04-07 PROCEDURE — 77387 GUIDANCE FOR RADJ TX DLVR: CPT | Mod: TC | Performed by: RADIOLOGY

## 2022-04-07 PROCEDURE — G6002 PR STEREOSCOPIC XRAY GUIDE FOR RADIATION TX DELIV: ICD-10-PCS | Mod: 26,,, | Performed by: RADIOLOGY

## 2022-04-07 PROCEDURE — 77412 RADIATION TX DELIVERY LVL 3: CPT | Performed by: RADIOLOGY

## 2022-04-07 PROCEDURE — G6002 STEREOSCOPIC X-RAY GUIDANCE: HCPCS | Mod: 26,,, | Performed by: RADIOLOGY

## 2022-04-07 PROCEDURE — 77336 RADIATION PHYSICS CONSULT: CPT | Performed by: RADIOLOGY

## 2022-04-08 PROCEDURE — 77412 RADIATION TX DELIVERY LVL 3: CPT | Performed by: RADIOLOGY

## 2022-04-08 PROCEDURE — G6002 STEREOSCOPIC X-RAY GUIDANCE: HCPCS | Mod: 26,,, | Performed by: RADIOLOGY

## 2022-04-08 PROCEDURE — 77387 GUIDANCE FOR RADJ TX DLVR: CPT | Mod: TC | Performed by: RADIOLOGY

## 2022-04-08 PROCEDURE — G6002 PR STEREOSCOPIC XRAY GUIDE FOR RADIATION TX DELIV: ICD-10-PCS | Mod: 26,,, | Performed by: RADIOLOGY

## 2022-04-11 ENCOUNTER — OFFICE VISIT (OUTPATIENT)
Dept: SURGERY | Facility: CLINIC | Age: 67
End: 2022-04-11
Payer: MEDICARE

## 2022-04-11 VITALS
BODY MASS INDEX: 29.41 KG/M2 | HEART RATE: 95 BPM | WEIGHT: 166 LBS | DIASTOLIC BLOOD PRESSURE: 87 MMHG | TEMPERATURE: 98 F | SYSTOLIC BLOOD PRESSURE: 126 MMHG

## 2022-04-11 DIAGNOSIS — C50.912 DUCTAL CARCINOMA IN SITU OF BREAST WITH MICROINVASIVE COMPONENT, LEFT: Primary | ICD-10-CM

## 2022-04-11 PROCEDURE — 99213 OFFICE O/P EST LOW 20 MIN: CPT | Mod: S$GLB,,, | Performed by: SURGERY

## 2022-04-11 PROCEDURE — 4010F ACE/ARB THERAPY RXD/TAKEN: CPT | Mod: CPTII,S$GLB,, | Performed by: SURGERY

## 2022-04-11 PROCEDURE — 1159F MED LIST DOCD IN RCRD: CPT | Mod: CPTII,S$GLB,, | Performed by: SURGERY

## 2022-04-11 PROCEDURE — G6002 PR STEREOSCOPIC XRAY GUIDE FOR RADIATION TX DELIV: ICD-10-PCS | Mod: 26,,, | Performed by: RADIOLOGY

## 2022-04-11 PROCEDURE — 4010F PR ACE/ARB THEARPY RXD/TAKEN: ICD-10-PCS | Mod: CPTII,S$GLB,, | Performed by: SURGERY

## 2022-04-11 PROCEDURE — 3079F DIAST BP 80-89 MM HG: CPT | Mod: CPTII,S$GLB,, | Performed by: SURGERY

## 2022-04-11 PROCEDURE — 3008F PR BODY MASS INDEX (BMI) DOCUMENTED: ICD-10-PCS | Mod: CPTII,S$GLB,, | Performed by: SURGERY

## 2022-04-11 PROCEDURE — 1157F ADVNC CARE PLAN IN RCRD: CPT | Mod: CPTII,S$GLB,, | Performed by: SURGERY

## 2022-04-11 PROCEDURE — 1126F PR PAIN SEVERITY QUANTIFIED, NO PAIN PRESENT: ICD-10-PCS | Mod: CPTII,S$GLB,, | Performed by: SURGERY

## 2022-04-11 PROCEDURE — 1159F PR MEDICATION LIST DOCUMENTED IN MEDICAL RECORD: ICD-10-PCS | Mod: CPTII,S$GLB,, | Performed by: SURGERY

## 2022-04-11 PROCEDURE — G6002 STEREOSCOPIC X-RAY GUIDANCE: HCPCS | Mod: 26,,, | Performed by: RADIOLOGY

## 2022-04-11 PROCEDURE — 77412 RADIATION TX DELIVERY LVL 3: CPT | Performed by: RADIOLOGY

## 2022-04-11 PROCEDURE — 3074F SYST BP LT 130 MM HG: CPT | Mod: CPTII,S$GLB,, | Performed by: SURGERY

## 2022-04-11 PROCEDURE — 99213 PR OFFICE/OUTPT VISIT, EST, LEVL III, 20-29 MIN: ICD-10-PCS | Mod: S$GLB,,, | Performed by: SURGERY

## 2022-04-11 PROCEDURE — 1160F PR REVIEW ALL MEDS BY PRESCRIBER/CLIN PHARMACIST DOCUMENTED: ICD-10-PCS | Mod: CPTII,S$GLB,, | Performed by: SURGERY

## 2022-04-11 PROCEDURE — 1126F AMNT PAIN NOTED NONE PRSNT: CPT | Mod: CPTII,S$GLB,, | Performed by: SURGERY

## 2022-04-11 PROCEDURE — 3066F NEPHROPATHY DOC TX: CPT | Mod: CPTII,S$GLB,, | Performed by: SURGERY

## 2022-04-11 PROCEDURE — 77387 GUIDANCE FOR RADJ TX DLVR: CPT | Mod: TC | Performed by: RADIOLOGY

## 2022-04-11 PROCEDURE — 3074F PR MOST RECENT SYSTOLIC BLOOD PRESSURE < 130 MM HG: ICD-10-PCS | Mod: CPTII,S$GLB,, | Performed by: SURGERY

## 2022-04-11 PROCEDURE — 3079F PR MOST RECENT DIASTOLIC BLOOD PRESSURE 80-89 MM HG: ICD-10-PCS | Mod: CPTII,S$GLB,, | Performed by: SURGERY

## 2022-04-11 PROCEDURE — 99999 PR PBB SHADOW E&M-EST. PATIENT-LVL IV: CPT | Mod: PBBFAC,,, | Performed by: SURGERY

## 2022-04-11 PROCEDURE — 1157F PR ADVANCE CARE PLAN OR EQUIV PRESENT IN MEDICAL RECORD: ICD-10-PCS | Mod: CPTII,S$GLB,, | Performed by: SURGERY

## 2022-04-11 PROCEDURE — 3008F BODY MASS INDEX DOCD: CPT | Mod: CPTII,S$GLB,, | Performed by: SURGERY

## 2022-04-11 PROCEDURE — 99999 PR PBB SHADOW E&M-EST. PATIENT-LVL IV: ICD-10-PCS | Mod: PBBFAC,,, | Performed by: SURGERY

## 2022-04-11 PROCEDURE — 3066F PR DOCUMENTATION OF TREATMENT FOR NEPHROPATHY: ICD-10-PCS | Mod: CPTII,S$GLB,, | Performed by: SURGERY

## 2022-04-11 PROCEDURE — 1160F RVW MEDS BY RX/DR IN RCRD: CPT | Mod: CPTII,S$GLB,, | Performed by: SURGERY

## 2022-04-11 NOTE — PROGRESS NOTES
[facility] Surgery Clinic  Follow-Up    Patient Name: Nadia Damon  YOB: 1955 (66 y.o.)  MRN: [id number]  Today's Date: 04/11/2022    Referring Md:   No referring provider defined for this encounter.    SUBJECTIVE:     Chief Complaint: F/u    History of Present Illness:  Nadia Damon is a 66 y.o. female with PMHx of heart transplant, breast cancer, doing well since last visit no further issues with infection. Continues XRT was recently in ER for respiratory issues but reports this is improving. Denies fevers,  Breast pain, swelling.       Review of patient's allergies indicates:   Allergen Reactions    Lisinopril Swelling and Rash    Augmentin [amoxicillin-pot clavulanate] Diarrhea       Past Medical History:   Diagnosis Date    Abdominal wall hernia     CT Renal 6/11/2018---Small fat containing superior ventral abdominal wall hernia at the epicardial pacing lead site.    Anxiety     Arthritis     ZEN HIPS    Breast cancer in female 08/2021    LEFT BREAST    Cellulitis of axilla, left 12/23/2021    Chronic diastolic heart failure 12/16/2021    Chronic kidney disease     stage 4, GFR 15-29 ml/min    Chronic midline low back pain without sciatica 6/18/2018    Coronary artery disease 1993    heart transplant    Depression     Fibromyalgia     on Lyrica    Heart failure     native heart cardiomyopathy    Heart transplanted 1993    due to cardiomyopathy    History of hyperparathyroidism; Hyperparathyroidism, secondary renal     PT DENIES    Hypertension     Immune disorder     anti rejection meds    Iron deficiency anemia 8/15/2017    Kidney stones     passed per pt    Obesity     Other osteoporosis without current pathological fracture 8/30/2019    Shingles 2003 approx    left leg    Trouble in sleeping     Urinary incontinence      Past Surgical History:   Procedure Laterality Date    BLADDER SURGERY  2015 approx    mesh - Dr Everett then 2nd reconstructive sx Dr Onofre     BREAST BIOPSY Bilateral     NEGATIVE    BREAST SURGERY Left 9/28/15    Bx - benign    BREAST SURGERY Right 12/2015    Bx benign    CARDIAC PACEMAKER REMOVAL Left 06/26/2014    Pacer defirillator removed. Put in 1993 aat time of heart transplant    CARPAL TUNNEL RELEASE Left 03/03/15    Dr. Hall    COLONOSCOPY N/A 2/25/2021    Procedure: COLONOSCOPY;  Surgeon: Freida Ramirez MD;  Location: [facility] ENDO;  Service: Endoscopy;  Laterality: N/A;    HEART TRANSPLANT  1993    HERNIA REPAIR Right 1971 approx    Inguinal    HYSTERECTOMY  1983    vag hyst /LSO     INCISION AND DRAINAGE OF ABSCESS Left 12/24/2021    Procedure: INCISION AND DRAINAGE, ABSCESS;  Surgeon: Joseph Longo MD;  Location: [facility] OR;  Service: General;  Laterality: Left;    INSERTION OF TUNNELED CENTRAL VENOUS CATHETER (CVC) WITH SUBCUTANEOUS PORT N/A 11/9/2021    Procedure: [id number];  Surgeon: Christoph Douglas MD;  Location: [facility] OR;  Service: General;  Laterality: N/A;    REMOVAL OF VASCULAR ACCESS PORT      SENTINEL LYMPH NODE BIOPSY Left 10/12/2021    Procedure: BIOPSY, LYMPH NODE, SENTINEL;  Surgeon: Christoph Douglas MD;  Location: [facility] OR;  Service: General;  Laterality: Left;    TOE SURGERY       Family History   Problem Relation Age of Onset    Cancer Mother 38        breast    Breast cancer Mother     Heart disease Maternal Grandmother     Cataracts Cousin     Hypertension Son     Diabetes Neg Hx     Stroke Neg Hx     Kidney disease Neg Hx     Asthma Neg Hx     COPD Neg Hx     Melanoma Neg Hx     Hyperlipidemia Neg Hx      Social History     Tobacco Use    Smoking status: Never Smoker    Smokeless tobacco: Never Used   Substance Use Topics    Alcohol use: Never     Alcohol/week: 0.0 standard drinks    Drug use: No        Review of Systems:  Review of Systems   Constitutional: Negative for chills and fever.   HENT: Negative for congestion and sore throat.    Respiratory: Negative for cough and shortness of  breath.    Cardiovascular: Negative for chest pain and leg swelling.   Gastrointestinal: Negative for abdominal pain, nausea and vomiting.   Genitourinary: Negative for dysuria.   Musculoskeletal: Negative for myalgias.   Skin: Negative for rash.   Neurological: Negative for weakness and headaches.       OBJECTIVE:     Vital Signs (Most Recent)  /87   Pulse 95   Temp 98.2 °F (36.8 °C) (Tympanic)   Wt 75.3 kg (166 lb 0.1 oz)   LMP 06/20/1983 (Within Years)   BMI 29.41 kg/m²     Physical Exam  Constitutional:       Appearance: She is well-developed.   HENT:      Head: Normocephalic and atraumatic.      Right Ear: External ear normal.      Left Ear: External ear normal.      Mouth/Throat:      Pharynx: No oropharyngeal exudate.   Eyes:      General: No scleral icterus.        Right eye: No discharge.         Left eye: No discharge.      Conjunctiva/sclera: Conjunctivae normal.      Pupils: Pupils are equal, round, and reactive to light.   Neck:      Thyroid: No thyromegaly.   Cardiovascular:      Rate and Rhythm: Normal rate.   Pulmonary:      Effort: Pulmonary effort is normal.   Chest:   Breasts:      Right: No inverted nipple, mass, nipple discharge, skin change, tenderness or supraclavicular adenopathy.      Left: No inverted nipple, mass, nipple discharge, skin change, tenderness or supraclavicular adenopathy.         Abdominal:      Palpations: Abdomen is soft.   Musculoskeletal:         General: Normal range of motion.      Right shoulder: No crepitus. Normal strength.      Cervical back: Normal range of motion and neck supple.   Lymphadenopathy:      Head:      Right side of head: No submental, submandibular, tonsillar, preauricular, posterior auricular or occipital adenopathy.      Left side of head: No submental, submandibular, tonsillar, preauricular, posterior auricular or occipital adenopathy.      Cervical: No cervical adenopathy.      Right cervical: No superficial or posterior cervical  adenopathy.     Left cervical: No superficial or posterior cervical adenopathy.      Upper Body:      Right upper body: No supraclavicular adenopathy.      Left upper body: No supraclavicular adenopathy.   Skin:     General: Skin is warm and dry.      Coloration: Skin is not pale.      Findings: No erythema or rash.   Neurological:      Mental Status: She is alert and oriented to person, place, and time.      Deep Tendon Reflexes: Reflexes are normal and symmetric.   Psychiatric:         Behavior: Behavior normal.         Thought Content: Thought content normal.         Judgment: Judgment normal.           ASSESSMENT/PLAN:     Nadia Damon is a 66 y.o. female status post left breast lumpectomy/sentinel lymph node biopsy    Doing well  Completing XRT soon  Keep oncology follow ups as scheduled

## 2022-04-12 ENCOUNTER — TELEPHONE (OUTPATIENT)
Dept: PRIMARY CARE CLINIC | Facility: CLINIC | Age: 67
End: 2022-04-12
Payer: MEDICARE

## 2022-04-12 ENCOUNTER — TELEPHONE (OUTPATIENT)
Dept: HEMATOLOGY/ONCOLOGY | Facility: CLINIC | Age: 67
End: 2022-04-12
Payer: MEDICARE

## 2022-04-12 PROCEDURE — 77387 GUIDANCE FOR RADJ TX DLVR: CPT | Mod: TC | Performed by: RADIOLOGY

## 2022-04-12 PROCEDURE — 77412 RADIATION TX DELIVERY LVL 3: CPT | Performed by: RADIOLOGY

## 2022-04-12 PROCEDURE — G6002 PR STEREOSCOPIC XRAY GUIDE FOR RADIATION TX DELIV: ICD-10-PCS | Mod: 26,,, | Performed by: RADIOLOGY

## 2022-04-12 PROCEDURE — G6002 STEREOSCOPIC X-RAY GUIDANCE: HCPCS | Mod: 26,,, | Performed by: RADIOLOGY

## 2022-04-12 NOTE — TELEPHONE ENCOUNTER
Hi. Patient wants to know if she can be seen on tomorrow at [facility] location. She she has the 2 pm at [facility] then she has a 2:30 pm at [facility]. She says that this will be too much on her.  Please contact patient & inform.      Thanks  Kelsie

## 2022-04-13 ENCOUNTER — OFFICE VISIT (OUTPATIENT)
Dept: PRIMARY CARE CLINIC | Facility: CLINIC | Age: 67
End: 2022-04-13
Payer: MEDICARE

## 2022-04-13 ENCOUNTER — OFFICE VISIT (OUTPATIENT)
Dept: HEMATOLOGY/ONCOLOGY | Facility: CLINIC | Age: 67
End: 2022-04-13
Payer: MEDICARE

## 2022-04-13 ENCOUNTER — DOCUMENTATION ONLY (OUTPATIENT)
Dept: RADIATION ONCOLOGY | Facility: CLINIC | Age: 67
End: 2022-04-13
Payer: MEDICARE

## 2022-04-13 ENCOUNTER — INFUSION (OUTPATIENT)
Dept: INFUSION THERAPY | Facility: HOSPITAL | Age: 67
End: 2022-04-13
Attending: NURSE PRACTITIONER
Payer: MEDICARE

## 2022-04-13 VITALS
TEMPERATURE: 98 F | SYSTOLIC BLOOD PRESSURE: 155 MMHG | DIASTOLIC BLOOD PRESSURE: 95 MMHG | OXYGEN SATURATION: 99 % | HEIGHT: 62 IN | WEIGHT: 161.63 LBS | HEART RATE: 103 BPM | BODY MASS INDEX: 29.74 KG/M2

## 2022-04-13 VITALS
TEMPERATURE: 98 F | SYSTOLIC BLOOD PRESSURE: 138 MMHG | DIASTOLIC BLOOD PRESSURE: 84 MMHG | OXYGEN SATURATION: 97 % | BODY MASS INDEX: 29.57 KG/M2 | WEIGHT: 160.69 LBS | HEIGHT: 62 IN | HEART RATE: 90 BPM

## 2022-04-13 VITALS
BODY MASS INDEX: 31.64 KG/M2 | WEIGHT: 171.94 LBS | HEIGHT: 62 IN | TEMPERATURE: 98 F | HEART RATE: 95 BPM | RESPIRATION RATE: 18 BRPM | OXYGEN SATURATION: 98 % | DIASTOLIC BLOOD PRESSURE: 89 MMHG | SYSTOLIC BLOOD PRESSURE: 147 MMHG

## 2022-04-13 DIAGNOSIS — Z94.1 HEART TRANSPLANTED: Primary | Chronic | ICD-10-CM

## 2022-04-13 DIAGNOSIS — M81.8 OTHER OSTEOPOROSIS WITHOUT CURRENT PATHOLOGICAL FRACTURE: ICD-10-CM

## 2022-04-13 DIAGNOSIS — N18.4 ANEMIA ASSOCIATED WITH STAGE 4 CHRONIC RENAL FAILURE: ICD-10-CM

## 2022-04-13 DIAGNOSIS — D63.1 ANEMIA ASSOCIATED WITH STAGE 4 CHRONIC RENAL FAILURE: ICD-10-CM

## 2022-04-13 DIAGNOSIS — N18.4 ANEMIA ASSOCIATED WITH STAGE 4 CHRONIC RENAL FAILURE: Primary | ICD-10-CM

## 2022-04-13 DIAGNOSIS — D50.9 IRON DEFICIENCY ANEMIA, UNSPECIFIED IRON DEFICIENCY ANEMIA TYPE: ICD-10-CM

## 2022-04-13 DIAGNOSIS — D84.9 IMMUNOCOMPROMISED PATIENT: ICD-10-CM

## 2022-04-13 DIAGNOSIS — I10 ESSENTIAL HYPERTENSION: ICD-10-CM

## 2022-04-13 DIAGNOSIS — D05.12 DUCTAL CARCINOMA IN SITU (DCIS) OF LEFT BREAST: ICD-10-CM

## 2022-04-13 DIAGNOSIS — D63.1 ANEMIA ASSOCIATED WITH STAGE 4 CHRONIC RENAL FAILURE: Primary | ICD-10-CM

## 2022-04-13 PROCEDURE — 1159F PR MEDICATION LIST DOCUMENTED IN MEDICAL RECORD: ICD-10-PCS | Mod: CPTII,S$GLB,, | Performed by: NURSE PRACTITIONER

## 2022-04-13 PROCEDURE — 1159F MED LIST DOCD IN RCRD: CPT | Mod: CPTII,S$GLB,, | Performed by: NURSE PRACTITIONER

## 2022-04-13 PROCEDURE — 4010F ACE/ARB THERAPY RXD/TAKEN: CPT | Mod: CPTII,S$GLB,, | Performed by: NURSE PRACTITIONER

## 2022-04-13 PROCEDURE — 3079F PR MOST RECENT DIASTOLIC BLOOD PRESSURE 80-89 MM HG: ICD-10-PCS | Mod: CPTII,S$GLB,, | Performed by: NURSE PRACTITIONER

## 2022-04-13 PROCEDURE — 3079F DIAST BP 80-89 MM HG: CPT | Mod: CPTII,S$GLB,, | Performed by: NURSE PRACTITIONER

## 2022-04-13 PROCEDURE — G6002 PR STEREOSCOPIC XRAY GUIDE FOR RADIATION TX DELIV: ICD-10-PCS | Mod: 26,,, | Performed by: RADIOLOGY

## 2022-04-13 PROCEDURE — 3066F PR DOCUMENTATION OF TREATMENT FOR NEPHROPATHY: ICD-10-PCS | Mod: CPTII,S$GLB,, | Performed by: NURSE PRACTITIONER

## 2022-04-13 PROCEDURE — 3077F SYST BP >= 140 MM HG: CPT | Mod: CPTII,95,,

## 2022-04-13 PROCEDURE — 3066F NEPHROPATHY DOC TX: CPT | Mod: CPTII,95,,

## 2022-04-13 PROCEDURE — 99214 PR OFFICE/OUTPT VISIT, EST, LEVL IV, 30-39 MIN: ICD-10-PCS | Mod: 25,95,,

## 2022-04-13 PROCEDURE — 3080F PR MOST RECENT DIASTOLIC BLOOD PRESSURE >= 90 MM HG: ICD-10-PCS | Mod: CPTII,95,,

## 2022-04-13 PROCEDURE — 99214 OFFICE O/P EST MOD 30 MIN: CPT | Mod: 25,95,,

## 2022-04-13 PROCEDURE — 1126F PR PAIN SEVERITY QUANTIFIED, NO PAIN PRESENT: ICD-10-PCS | Mod: CPTII,95,,

## 2022-04-13 PROCEDURE — 1126F AMNT PAIN NOTED NONE PRSNT: CPT | Mod: CPTII,S$GLB,, | Performed by: NURSE PRACTITIONER

## 2022-04-13 PROCEDURE — 1157F PR ADVANCE CARE PLAN OR EQUIV PRESENT IN MEDICAL RECORD: ICD-10-PCS | Mod: CPTII,S$GLB,, | Performed by: NURSE PRACTITIONER

## 2022-04-13 PROCEDURE — G6002 STEREOSCOPIC X-RAY GUIDANCE: HCPCS | Mod: 26,,, | Performed by: RADIOLOGY

## 2022-04-13 PROCEDURE — 3075F SYST BP GE 130 - 139MM HG: CPT | Mod: CPTII,S$GLB,, | Performed by: NURSE PRACTITIONER

## 2022-04-13 PROCEDURE — 3066F NEPHROPATHY DOC TX: CPT | Mod: CPTII,S$GLB,, | Performed by: NURSE PRACTITIONER

## 2022-04-13 PROCEDURE — 77412 RADIATION TX DELIVERY LVL 3: CPT | Performed by: RADIOLOGY

## 2022-04-13 PROCEDURE — 1126F AMNT PAIN NOTED NONE PRSNT: CPT | Mod: CPTII,95,,

## 2022-04-13 PROCEDURE — 1101F PT FALLS ASSESS-DOCD LE1/YR: CPT | Mod: CPTII,S$GLB,, | Performed by: NURSE PRACTITIONER

## 2022-04-13 PROCEDURE — 1157F ADVNC CARE PLAN IN RCRD: CPT | Mod: CPTII,95,,

## 2022-04-13 PROCEDURE — 3080F DIAST BP >= 90 MM HG: CPT | Mod: CPTII,95,,

## 2022-04-13 PROCEDURE — 4010F PR ACE/ARB THEARPY RXD/TAKEN: ICD-10-PCS | Mod: CPTII,95,,

## 2022-04-13 PROCEDURE — 3008F BODY MASS INDEX DOCD: CPT | Mod: CPTII,S$GLB,, | Performed by: NURSE PRACTITIONER

## 2022-04-13 PROCEDURE — 4010F PR ACE/ARB THEARPY RXD/TAKEN: ICD-10-PCS | Mod: CPTII,S$GLB,, | Performed by: NURSE PRACTITIONER

## 2022-04-13 PROCEDURE — 99999 PR PBB SHADOW E&M-EST. PATIENT-LVL V: CPT | Mod: PBBFAC,,, | Performed by: NURSE PRACTITIONER

## 2022-04-13 PROCEDURE — 3008F PR BODY MASS INDEX (BMI) DOCUMENTED: ICD-10-PCS | Mod: CPTII,95,,

## 2022-04-13 PROCEDURE — 99213 OFFICE O/P EST LOW 20 MIN: CPT | Mod: S$GLB,,, | Performed by: NURSE PRACTITIONER

## 2022-04-13 PROCEDURE — 3008F PR BODY MASS INDEX (BMI) DOCUMENTED: ICD-10-PCS | Mod: CPTII,S$GLB,, | Performed by: NURSE PRACTITIONER

## 2022-04-13 PROCEDURE — 96372 THER/PROPH/DIAG INJ SC/IM: CPT

## 2022-04-13 PROCEDURE — 99213 PR OFFICE/OUTPT VISIT, EST, LEVL III, 20-29 MIN: ICD-10-PCS | Mod: S$GLB,,, | Performed by: NURSE PRACTITIONER

## 2022-04-13 PROCEDURE — 99999 PR PBB SHADOW E&M-EST. PATIENT-LVL V: ICD-10-PCS | Mod: PBBFAC,,, | Performed by: NURSE PRACTITIONER

## 2022-04-13 PROCEDURE — 3075F PR MOST RECENT SYSTOLIC BLOOD PRESS GE 130-139MM HG: ICD-10-PCS | Mod: CPTII,S$GLB,, | Performed by: NURSE PRACTITIONER

## 2022-04-13 PROCEDURE — 3288F PR FALLS RISK ASSESSMENT DOCUMENTED: ICD-10-PCS | Mod: CPTII,S$GLB,, | Performed by: NURSE PRACTITIONER

## 2022-04-13 PROCEDURE — 1126F PR PAIN SEVERITY QUANTIFIED, NO PAIN PRESENT: ICD-10-PCS | Mod: CPTII,S$GLB,, | Performed by: NURSE PRACTITIONER

## 2022-04-13 PROCEDURE — 3288F FALL RISK ASSESSMENT DOCD: CPT | Mod: CPTII,S$GLB,, | Performed by: NURSE PRACTITIONER

## 2022-04-13 PROCEDURE — 3008F BODY MASS INDEX DOCD: CPT | Mod: CPTII,95,,

## 2022-04-13 PROCEDURE — 1157F ADVNC CARE PLAN IN RCRD: CPT | Mod: CPTII,S$GLB,, | Performed by: NURSE PRACTITIONER

## 2022-04-13 PROCEDURE — 1157F PR ADVANCE CARE PLAN OR EQUIV PRESENT IN MEDICAL RECORD: ICD-10-PCS | Mod: CPTII,95,,

## 2022-04-13 PROCEDURE — 4010F ACE/ARB THERAPY RXD/TAKEN: CPT | Mod: CPTII,95,,

## 2022-04-13 PROCEDURE — 1101F PR PT FALLS ASSESS DOC 0-1 FALLS W/OUT INJ PAST YR: ICD-10-PCS | Mod: CPTII,S$GLB,, | Performed by: NURSE PRACTITIONER

## 2022-04-13 PROCEDURE — 77387 GUIDANCE FOR RADJ TX DLVR: CPT | Mod: TC | Performed by: RADIOLOGY

## 2022-04-13 PROCEDURE — 3066F PR DOCUMENTATION OF TREATMENT FOR NEPHROPATHY: ICD-10-PCS | Mod: CPTII,95,,

## 2022-04-13 PROCEDURE — 3077F PR MOST RECENT SYSTOLIC BLOOD PRESSURE >= 140 MM HG: ICD-10-PCS | Mod: CPTII,95,,

## 2022-04-13 PROCEDURE — 63600175 PHARM REV CODE 636 W HCPCS: Mod: JG,EC | Performed by: NURSE PRACTITIONER

## 2022-04-13 RX ORDER — HYDRALAZINE HYDROCHLORIDE 50 MG/1
50 TABLET, FILM COATED ORAL EVERY 8 HOURS
Qty: 270 TABLET | Refills: 3 | Status: SHIPPED | OUTPATIENT
Start: 2022-04-13 | End: 2022-04-14

## 2022-04-13 RX ORDER — CARVEDILOL 12.5 MG/1
TABLET ORAL
COMMUNITY
Start: 2022-04-08 | End: 2022-05-25

## 2022-04-13 RX ADMIN — EPOETIN ALFA-EPBX 20000 UNITS: 20000 INJECTION, SOLUTION INTRAVENOUS; SUBCUTANEOUS at 02:04

## 2022-04-13 NOTE — PROGRESS NOTES
Nadia Damon  04/13/2022  [id number]    Luz Dickson NP  Patient Care Team:  Luz Dickson NP as PCP - General (Family Medicine)  Miguel Soni Jr., MD as Consulting Physician (Vascular Surgery)  Ike King MD as Consulting Physician (Cardiology)  Courtney Tubbs MD as Consulting Physician (Cardiology)  Carter Crawford MD as Consulting Physician (Nephrology)  Paxton Vasques OD as Consulting Physician (Optometry)  PRANAY Villalobos MD as Obstetrician (Obstetrics)  Parker Mccarthy IV, MD (Urology)  Ike King MD as Consulting Physician (Cardiology)  Jose Roland MD as Consulting Physician (Dermatology)  Prasanth Johnson MD as Consulting Physician (Rheumatology)  Ayanna Hart RN as Oncology Navigator  Nora Morin LMSW as         [facility] Primary Care Note      Chief Complaint:  Chief Complaint   Patient presents with    1 week f/u        History of Present Illness:  HPI    Seen by cardiology, dose of carvedilol increased and frequency of hydralazine.   Completes XRT tomorrow!    Still fatigued but feeling much better. GERD sx much improved.     No further CP or dyspnea.       ROS      The following were reviewed: Active problem list, medication list, allergies, family history, social history, and Health Maintenance.     History:  Past Medical History:   Diagnosis Date    Abdominal wall hernia     CT Renal 6/11/2018---Small fat containing superior ventral abdominal wall hernia at the epicardial pacing lead site.    Anxiety     Arthritis     ZEN HIPS    Breast cancer in female 08/2021    LEFT BREAST    Cellulitis of axilla, left 12/23/2021    Chronic diastolic heart failure 12/16/2021    Chronic kidney disease     stage 4, GFR 15-29 ml/min    Chronic midline low back pain without sciatica 6/18/2018    Coronary artery disease 1993    heart transplant    Depression     Fibromyalgia     on Lyrica    Heart failure     native heart cardiomyopathy    Heart  transplanted 1993    due to cardiomyopathy    History of hyperparathyroidism; Hyperparathyroidism, secondary renal     PT DENIES    Hypertension     Immune disorder     anti rejection meds    Iron deficiency anemia 8/15/2017    Kidney stones     passed per pt    Obesity     Other osteoporosis without current pathological fracture 8/30/2019    Shingles 2003 approx    left leg    Trouble in sleeping     Urinary incontinence      Past Surgical History:   Procedure Laterality Date    BLADDER SURGERY  2015 approx    mesh - Dr Everett then 2nd reconstructive sx Dr Onofre    BREAST BIOPSY Bilateral     NEGATIVE    BREAST SURGERY Left 9/28/15    Bx - benign    BREAST SURGERY Right 12/2015    Bx benign    CARDIAC PACEMAKER REMOVAL Left 06/26/2014    Pacer defirillator removed. Put in 1993 aat time of heart transplant    CARPAL TUNNEL RELEASE Left 03/03/15    Dr. Hall    COLONOSCOPY N/A 2/25/2021    Procedure: COLONOSCOPY;  Surgeon: Freida Ramirez MD;  [facility];  Service: Endoscopy;  Laterality: N/A;    HEART TRANSPLANT  1993    HERNIA REPAIR Right 1971 approx    Inguinal    HYSTERECTOMY  1983    vag hyst /LSO     INCISION AND DRAINAGE OF ABSCESS Left 12/24/2021    Procedure: INCISION AND DRAINAGE, ABSCESS;  Surgeon: Joseph Longo MD;  Location: [facility];  Service: General;  Laterality: Left;    INSERTION OF TUNNELED CENTRAL VENOUS CATHETER (CVC) WITH SUBCUTANEOUS PORT N/A 11/9/2021    Procedure: [id number];  Surgeon: Christoph Douglas MD;  Location: [facility] OR;  Service: General;  Laterality: N/A;    REMOVAL OF VASCULAR ACCESS PORT      SENTINEL LYMPH NODE BIOPSY Left 10/12/2021    Procedure: BIOPSY, LYMPH NODE, SENTINEL;  Surgeon: Christoph Douglas MD;  Location: [facility];  Service: General;  Laterality: Left;    TOE SURGERY       Family History   Problem Relation Age of Onset    Cancer Mother 38        breast    Breast cancer Mother     Heart disease Maternal Grandmother      Cataracts Cousin     Hypertension Son     Diabetes Neg Hx     Stroke Neg Hx     Kidney disease Neg Hx     Asthma Neg Hx     COPD Neg Hx     Melanoma Neg Hx     Hyperlipidemia Neg Hx      Social History     Socioeconomic History    Marital status: Single    Number of children: 2    Highest education level: 11th grade   Occupational History    Occupation: Retired   Tobacco Use    Smoking status: Never Smoker    Smokeless tobacco: Never Used   Substance and Sexual Activity    Alcohol use: Never     Alcohol/week: 0.0 standard drinks    Drug use: No    Sexual activity: Not Currently     Partners: Male     Birth control/protection: See Surgical Hx   Other Topics Concern    Are you pregnant or think you may be? No    Breast-feeding No   Social History Narrative    Single. 2 children , 1  at 30 yoa   strep throat -  pneumonia and renal complications after not completing course of AB. Other child lives in [place], [place]. Has a cousin locally that could help in an emergency. 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 Caffeine intake =- 1 cola a day. No coffee, + occasional tea, avoids caffeine especially at night. Still drives. She does not have a Living Will or Advanced directive.      Social Determinants of Health     Financial Resource Strain: Low Risk     Difficulty of Paying Living Expenses: Not hard at all   Food Insecurity: No Food Insecurity    Worried About Running Out of Food in the Last Year: Never true    Ran Out of Food in the Last Year: Never true   Transportation Needs: No Transportation Needs    Lack of Transportation (Medical): No    Lack of Transportation (Non-Medical): No   Physical Activity: Insufficiently Active    Days of Exercise per Week: 2 days    Minutes of Exercise per Session: 10 min   Stress: No Stress Concern Present    Feeling of Stress : Not at all   Social Connections: Socially Isolated    Frequency of Communication with Friends  and Family: Once a week    Frequency of Social Gatherings with Friends and Family: Once a week    Attends Restoration Services: More than 4 times per year    Active Member of Clubs or Organizations: No    Attends Club or Organization Meetings: Never    Marital Status: Never    Housing Stability: Low Risk     Unable to Pay for Housing in the Last Year: No    Number of Places Lived in the Last Year: 1    Unstable Housing in the Last Year: No     Patient Active Problem List   Diagnosis    Heart transplanted    Anxiety    Insomnia    CKD (chronic kidney disease) stage 4, GFR 15-29 ml/min    Immunosuppression due to drug therapy    Fibromyalgia    Gastroesophageal reflux disease without esophagitis    Breast screening    Immunization deficiency    Essential hypertension    Aortic atherosclerosis    Chronic major depressive disorder, recurrent episode    Iron deficiency anemia    Vaginal atrophy    Hyperparathyroidism    Hx of falling    Chronic midline low back pain without sciatica    Closed nondisplaced fracture of distal phalanx of left great toe with routine healing    Lightheadedness    Medication side effects    Obesity (BMI 30.0-34.9)    Edema    Asymptomatic menopausal state     Other osteoporosis without current pathological fracture    Cough    Abnormal CT scan, kidney    Weakness of both lower extremities    Immunocompromised patient    Osteoporosis, post-menopausal    Ductal carcinoma in situ (DCIS) of left breast    Immunodeficiency secondary to radiation therapy    Abnormal mammogram    The hangs, uncomplicated    Severe sepsis    GRACE (acute kidney injury)    Diarrhea    Thrombocytopenia, unspecified    Immunodeficiency due to chemotherapy    C. difficile colitis    Strain of left shoulder    Left shoulder pain    Ulnar neuropathy of left upper extremity    Anemia associated with stage 4 chronic renal failure    Secondary and unspecified malignant  neoplasm of axilla and upper limb lymph nodes    Other chest pain     Review of patient's allergies indicates:   Allergen Reactions    Lisinopril Swelling and Rash    Augmentin [amoxicillin-pot clavulanate] Diarrhea       Medications:  Current Outpatient Medications on File Prior to Visit   Medication Sig Dispense Refill    acetaminophen (TYLENOL) 325 MG tablet Take 1 tablet (325 mg total) by mouth every 6 (six) hours as needed for Pain.  0    amLODIPine (NORVASC) 2.5 MG tablet       aspirin (ECOTRIN) 81 MG EC tablet Take 1 tablet (81 mg total) by mouth once daily. 90 tablet 3    atenoloL (TENORMIN) 50 MG tablet Take 1 tablet (50 mg total) by mouth once daily. 30 tablet 0    biotin 10,000 mcg Cap Take 1 tablet by mouth once daily.      busPIRone (BUSPAR) 10 MG tablet Take 1 tablet (10 mg total) by mouth once daily. 90 tablet 3    calcitonin, salmon, (FORTICAL) 200 unit/actuation nasal spray 1 spray by Nasal route once daily. 3.7 mL 3    carvediloL (COREG) 12.5 MG tablet       cloNIDine (CATAPRES) 0.1 MG tablet Take 1 tablet (0.1 mg total) by mouth nightly as needed (BP >150/95). 90 tablet 3    cycloSPORINE modified, NEORAL, (NEORAL) 25 MG capsule TAKE 3 CAPSULES (75 MG TOTAL) BY MOUTH 2 (TWO) TIMES DAILY. 540 capsule 6    DULoxetine (CYMBALTA) 30 MG capsule TAKE 1 CAPSULE EVERY DAY 90 capsule 1    EVENING PRIMROSE OIL ORAL Take 1,000 mg by mouth once daily.      famotidine (PEPCID) 40 MG tablet Take 1 tablet (40 mg total) by mouth every other day. 15 tablet 2    furosemide (LASIX) 20 MG tablet Take 1 tablet (20 mg total) by mouth 2 (two) times a day for 5 days, THEN 1 tablet (20 mg total) once daily. Take 1 tablet daily. 370 tablet 0    losartan (COZAAR) 25 MG tablet Take 1 tablet (25 mg total) by mouth once daily. 90 tablet 3    multivitamin capsule Take 1 capsule by mouth once daily.      ondansetron (ZOFRAN-ODT) 8 MG TbDL Take 1 tablet (8 mg total) by mouth every 8 (eight) hours as needed  (nausea). 60 tablet 5    pantoprazole (PROTONIX) 40 MG tablet Take 1 tablet (40 mg total) by mouth once daily. 90 tablet 1    pregabalin (LYRICA) 50 MG capsule Take 1 capsule (50 mg total) by mouth 2 (two) times daily. NOON & NIGHT TIME 180 capsule 1    sucralfate (CARAFATE) 1 gram tablet Take 1 tablet (1 g total) by mouth 4 (four) times daily before meals and nightly. 120 tablet 0    temazepam (RESTORIL) 30 mg capsule Take 1 at bedtime 90 capsule 1    vitamin E 400 UNIT capsule Take 400 Units by mouth once daily.      zolpidem (AMBIEN) 10 mg Tab TAKE 1 TABLET BY MOUTH ONCE DAILY IN THE EVENING 90 tablet 1    [DISCONTINUED] carvediloL (COREG) 6.25 MG tablet Take 1 tablet (6.25 mg total) by mouth 2 (two) times daily. 180 tablet 3    [DISCONTINUED] hydrALAZINE (APRESOLINE) 50 MG tablet TAKE 2 TABLETS  EVERY 8  HOURS. (Patient taking differently: TAKE 2 TABLETS  EVERY 8  HOURS.) 540 tablet 3     Current Facility-Administered Medications on File Prior to Visit   Medication Dose Route Frequency Provider Last Rate Last Admin    denosumab (PROLIA) injection 60 mg  60 mg Subcutaneous Q6 Months Prasanth Johnson MD        [COMPLETED] epoetin tristan-epbx injection 20,000 Units  20,000 Units Subcutaneous 1 time in Clinic/HOD Yudith Mendoza NP   20,000 Units at 04/13/22 1445    lactated ringers infusion   Intravenous Continuous Jennifer Carr MD 10 mL/hr at 11/09/21 0717 Restarted at 11/09/21 0820       Medications have been reviewed and reconciled with patient at visit today.    Barriers to medications present (no )    Adverse reactions to current medications (no)      Exam:  Vitals:    04/13/22 1511   BP: 138/84   Pulse: 90   Temp: 97.6 °F (36.4 °C)     Weight: 72.9 kg (160 lb 11.5 oz)   Body mass index is 29.4 kg/m².      BP Readings from Last 3 Encounters:   04/13/22 138/84   04/13/22 (!) 147/89   04/13/22 (!) 155/95     Wt Readings from Last 3 Encounters:   04/13/22 1511 72.9 kg (160 lb 11.5 oz)   04/13/22 7522  78 kg (171 lb 15.3 oz)   04/13/22 1349 73.3 kg (161 lb 9.6 oz)            Physical Exam  Constitutional:       General: She is not in acute distress.     Appearance: She is not ill-appearing.   Eyes:      General: No scleral icterus.  Cardiovascular:      Rate and Rhythm: Normal rate and regular rhythm.      Heart sounds: Normal heart sounds.   Pulmonary:      Effort: No respiratory distress.      Breath sounds: Normal breath sounds.   Abdominal:      Palpations: Abdomen is soft.      Tenderness: There is no abdominal tenderness.   Skin:     General: Skin is warm and dry.   Neurological:      Mental Status: She is alert. Mental status is at baseline.   Psychiatric:         Mood and Affect: Mood normal.         Behavior: Behavior normal.         Thought Content: Thought content normal.         Laboratory Reviewed: (Yes)  Lab Results   Component Value Date    WBC 3.17 (L) 04/06/2022    HGB 10.0 (L) 04/06/2022    HCT 31.7 (L) 04/06/2022     04/06/2022    CHOL 157 12/24/2021    TRIG 120 12/24/2021    HDL 42 12/24/2021    ALT 22 04/06/2022    AST 38 04/06/2022     04/06/2022    K 4.3 04/06/2022     04/06/2022    CREATININE 2.6 (H) 04/06/2022    BUN 42 (H) 04/06/2022    CO2 22 (L) 04/06/2022    TSH 5.158 (H) 12/24/2021    PSA <0.1 05/27/2008    INR 1.0 11/22/2021    HGBA1C 5.2 12/24/2021           Health Maintenance  Health Maintenance Topics with due status: Not Due       Topic Last Completion Date    Pneumococcal Vaccines (Age 65+) 10/22/2018    TETANUS VACCINE 09/09/2020    Colorectal Cancer Screening 02/25/2021    DEXA Scan 08/03/2021    Lipid Panel 12/24/2021    Mammogram 02/09/2022     Health Maintenance Due   Topic Date Due    COVID-19 Vaccine (3 - Moderna risk 4-dose series) 11/25/2021       Assessment:  Problem List Items Addressed This Visit        Cardiac/Vascular    Essential hypertension    Relevant Medications    hydrALAZINE (APRESOLINE) 50 MG tablet            Plan:  Essential  hypertension  -     hydrALAZINE (APRESOLINE) 50 MG tablet; Take 1 tablet (50 mg total) by mouth every 8 (eight) hours. TAKE 2 TABLETS  EVERY 8  HOURS.  Dispense: 270 tablet; Refill: 3      -Patient's lab results were reviewed and discussed with patient  -Treatment options and alternatives were discussed with the patient. Patient expressed understanding. Patient was given the opportunity to ask questions and be an active participant in their medical care. Patient had no further questions or concerns at this time.   -Documentation of patient's health and condition was obtained from family member who was present during visit.  -Patient is an overall moderate risk for health complications from their medical conditions.       Follow up: Follow up in about 3 months (around 7/13/2022).      After visit summary printed and given to patient upon discharge.  Patient goals and care plan are included in After visit summary.    Total medical decision making time was 20 min.  The following issues were discussed: The encounter diagnosis was Essential hypertension.    Health maintenance needs, recent test results and goals of care discussed with pt and questions answered.

## 2022-04-13 NOTE — PLAN OF CARE
Day 15 of outpatient xrt to the breast. Burning sensation to skin; gel packs given; brisk erythema, no skin breakdown; using miaderm cream. Will continue to monitor.

## 2022-04-13 NOTE — DISCHARGE INSTRUCTIONS
.[facility]  [address] [facility]  [address]  [phone] phone     [phone] fax  Hours of Operation: Monday- Friday 8:00am- 5:00pm  After hours phone  [phone]  Hematology / Oncology Physicians on call    Dr. Dennis Ch      Nurse Practitioners:    Kristine Delgado, ONELIA Duran, ONELIA Mendoza, ONELIA Marina, ONELIA Burk, PA      Please don't hesitate to call if you have any concerns.   .FALL PREVENTION   Falls often occur due to slipping, tripping or losing your balance. Here are ways to reduce your risk of falling again.   Was there anything that caused your fall that can be fixed, removed or replaced?   Make your home safe by keeping walkways clear of objects you may trip over.   Use non-slip pads under rugs.   Do not walk in poorly lit areas.   Do not stand on chairs or wobbly ladders.   Use caution when reaching overhead or looking upward. This position can cause a loss of balance.   Be sure your shoes fit properly, have non-slip bottoms and are in good condition.   Be cautious when going up and down stairs, curbs, and when walking on uneven sidewalks.   If your balance is poor, consider using a cane or walker.   If your fall was related to alcohol use, stop or limit alcohol intake.   If your fall was related to use of sleeping medicines, talk to your doctor about this. You may need to reduce your dosage at bedtime if you awaken during the night to go to the bathroom.   To reduce the need for nighttime bathroom trips:   Avoid drinking fluids for several hours before going to bed   Empty your bladder before going to bed   Men can keep a urinal at the bedside   © 0329-1012 [employer], [address] [place] [address], [place] [address]. All rights reserved. This information is not intended as a substitute for professional medical care. Always follow your healthcare professional's instructions.  .WAYS  TO HELP PREVENT INFECTION        WASH YOUR HANDS OFTEN DURING THE DAY, ESPECIALLY BEFORE YOU EAT, AFTER USING THE BATHROOM, AND AFTER TOUCHING ANIMALS    STAY AWAY FROM PEOPLE WHO HAVE ILLNESSES YOU CAN CATCH; SUCH AS COLDS, FLU, CHICKEN POX    TRY TO AVOID CROWDS    STAY AWAY FROM CHILDREN WHO RECENTLY HAVE RECEIVED LIVE VIRUS VACCINES    MAINTAIN GOOD MOUTH CARE    DO NOT SQUEEZE OR SCRATCH PIMPLES    CLEAN CUTS & SCRAPES RIGHT AWAY AND DAILY UNTIL HEALED WITH WARM WATER, SOAP & AN ANTISEPTIC    AVOID CONTACT WITH LITTER BOXES, BIRD CAGES, & FISH TANKS    AVOID STANDING WATER, IE., BIRD BATHS, FLOWER POTS/VASES, OR HUMIDIFIERS    WEAR GLOVES WHEN GARDENING OR CLEANING UP AFTER OTHERS, ESPECIALLY BABIES & SMALL CHILDREN    DO NOT EAT RAW FISH, SEAFOOD, MEAT, OR EGGS

## 2022-04-13 NOTE — PROGRESS NOTES
Subjective:       Patient ID: Nadia Damon is a 66 y.o. female.    Chief Complaint: Anemia    Primary Oncologist/Hematologist: Dr. Collins    HPI: Ms. Damon is a 66 year old female who is following up for her anemia due to CKD. Epo has been initiated. May need to have dose reduced to 10kU for q 2 weeks. 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 She was treated with 1 cycle of systemic chemotherapy cytoxan and taxotere and udenyca that was discontinued due to toxicity. She has seen surgery and healing well. She is also following with radiation oncology.   Pmhx: heart transplant 26 yrs ago, on anti rejection medication. She was seen in ER on 22 for chest pain.    Today: Patient states she has been tired, but feels well otherwise. 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 She denies any sob, chest pain (improved since ER visit), melena, hematuria, bleeding elsewhere, pica, headaches, fevers, night sweats, weight loss, n/v/d/c.     Social History     Socioeconomic History    Marital status: Single    Number of children: 2    Highest education level: 11th grade   Occupational History    Occupation: Retired   Tobacco Use    Smoking status: Never Smoker    Smokeless tobacco: Never Used   Substance and Sexual Activity    Alcohol use: Never     Alcohol/week: 0.0 standard drinks    Drug use: No    Sexual activity: Not Currently     Partners: Male     Birth control/protection: See Surgical Hx   Other Topics Concern    Are you pregnant or think you may be? No    Breast-feeding No   Social History Narrative    Single. 2 children , 1  at 30 yoa  2014 strep throat -  pneumonia and renal complications after not completing course of AB. Other child lives in [place], [place]. Has a cousin locally that could help in an emergency. Patient still does some sitter work. On Disability for heart  transplant. Caffeine intake =- 1 cola a day. No coffee, + occasional tea, avoids caffeine especially at night. Still drives. She does not have a Living Will or Advanced directive.      Social Determinants of Health     Financial Resource Strain: Low Risk     Difficulty of Paying Living Expenses: Not hard at all   Food Insecurity: No Food Insecurity    Worried About Running Out of Food in the Last Year: Never true    Ran Out of Food in the Last Year: Never true   Transportation Needs: No Transportation Needs    Lack of Transportation (Medical): No    Lack of Transportation (Non-Medical): No   Physical Activity: Insufficiently Active    Days of Exercise per Week: 2 days    Minutes of Exercise per Session: 10 min   Stress: No Stress Concern Present    Feeling of Stress : Not at all   Social Connections: Socially Isolated    Frequency of Communication with Friends and Family: Once a week    Frequency of Social Gatherings with Friends and Family: Once a week    Attends Restorationist Services: More than 4 times per year    Active Member of Clubs or Organizations: No    Attends Club or Organization Meetings: Never    Marital Status: Never    Housing Stability: Low Risk     Unable to Pay for Housing in the Last Year: No    Number of Places Lived in the Last Year: 1    Unstable Housing in the Last Year: No       Past Medical History:   Diagnosis Date    Abdominal wall hernia     CT Renal 6/11/2018---Small fat containing superior ventral abdominal wall hernia at the epicardial pacing lead site.    Anxiety     Arthritis     ZEN HIPS    Breast cancer in female 08/2021    LEFT BREAST    Cellulitis of axilla, left 12/23/2021    Chronic diastolic heart failure 12/16/2021    Chronic kidney disease     stage 4, GFR 15-29 ml/min    Chronic midline low back pain without sciatica 6/18/2018    Coronary artery disease 1993    heart transplant    Depression     Fibromyalgia     on Lyrica    Heart failure      native heart cardiomyopathy    Heart transplanted 1993    due to cardiomyopathy    History of hyperparathyroidism; Hyperparathyroidism, secondary renal     PT DENIES    Hypertension     Immune disorder     anti rejection meds    Iron deficiency anemia 8/15/2017    Kidney stones     passed per pt    Obesity     Other osteoporosis without current pathological fracture 8/30/2019    Shingles 2003 approx    left leg    Trouble in sleeping     Urinary incontinence        Family History   Problem Relation Age of Onset    Cancer Mother 38        breast    Breast cancer Mother     Heart disease Maternal Grandmother     Cataracts Cousin     Hypertension Son     Diabetes Neg Hx     Stroke Neg Hx     Kidney disease Neg Hx     Asthma Neg Hx     COPD Neg Hx     Melanoma Neg Hx     Hyperlipidemia Neg Hx        Past Surgical History:   Procedure Laterality Date    BLADDER SURGERY  2015 approx    mesh - Dr Everett then 2nd reconstructive sx Dr Onofre    BREAST BIOPSY Bilateral     NEGATIVE    BREAST SURGERY Left 9/28/15    Bx - benign    BREAST SURGERY Right 12/2015    Bx benign    CARDIAC PACEMAKER REMOVAL Left 06/26/2014    Pacer defirillator removed. Put in 1993 aat time of heart transplant    CARPAL TUNNEL RELEASE Left 03/03/15    Dr. Hall    COLONOSCOPY N/A 2/25/2021    Procedure: COLONOSCOPY;  Surgeon: Freida Ramirez MD;  Location[facility];  Service: Endoscopy;  Laterality: N/A;    HEART TRANSPLANT  1993    HERNIA REPAIR Right 1971 approx    Inguinal    HYSTERECTOMY  1983    vag hyst /LSO     INCISION AND DRAINAGE OF ABSCESS Left 12/24/2021    Procedure: INCISION AND DRAINAGE, ABSCESS;  Surgeon: Joseph Longo MD;  Location: [facility] OR;  Service: General;  Laterality: Left;    INSERTION OF TUNNELED CENTRAL VENOUS CATHETER (CVC) WITH SUBCUTANEOUS PORT N/A 11/9/2021    Procedure: [id number];  Surgeon: Christoph Douglas MD;  Location: [facility] OR;  Service: General;  Laterality:  N/A;    REMOVAL OF VASCULAR ACCESS PORT      SENTINEL LYMPH NODE BIOPSY Left 10/12/2021    Procedure: BIOPSY, LYMPH NODE, SENTINEL;  Surgeon: Christoph Douglas MD;  Location: [facility];  Service: General;  Laterality: Left;    TOE SURGERY         Review of Systems   Constitutional: Positive for fatigue. Negative for activity change, appetite change, chills, diaphoresis, fever and unexpected weight change.   HENT: Negative for congestion, nosebleeds and rhinorrhea.    Respiratory: Negative for cough and shortness of breath.    Cardiovascular: Negative for chest pain and leg swelling.   Gastrointestinal: Negative for abdominal pain, blood in stool, constipation, diarrhea, nausea and vomiting.   Genitourinary: Negative for hematuria.   Musculoskeletal: Positive for arthralgias and myalgias.   Skin: Negative.    Neurological: Negative for dizziness, weakness, light-headedness, numbness and headaches.         Medication List with Changes/Refills   Current Medications    ACETAMINOPHEN (TYLENOL) 325 MG TABLET    Take 1 tablet (325 mg total) by mouth every 6 (six) hours as needed for Pain.    AMLODIPINE (NORVASC) 2.5 MG TABLET        ASPIRIN (ECOTRIN) 81 MG EC TABLET    Take 1 tablet (81 mg total) by mouth once daily.    ATENOLOL (TENORMIN) 50 MG TABLET    Take 1 tablet (50 mg total) by mouth once daily.    BIOTIN 10,000 MCG CAP    Take 1 tablet by mouth once daily.    BUSPIRONE (BUSPAR) 10 MG TABLET    Take 1 tablet (10 mg total) by mouth once daily.    CALCITONIN, SALMON, (FORTICAL) 200 UNIT/ACTUATION NASAL SPRAY    1 spray by Nasal route once daily.    CARVEDILOL (COREG) 6.25 MG TABLET    Take 1 tablet (6.25 mg total) by mouth 2 (two) times daily.    CLONIDINE (CATAPRES) 0.1 MG TABLET    Take 1 tablet (0.1 mg total) by mouth nightly as needed (BP >150/95).    CYCLOSPORINE MODIFIED, NEORAL, (NEORAL) 25 MG CAPSULE    TAKE 3 CAPSULES (75 MG TOTAL) BY MOUTH 2 (TWO) TIMES DAILY.    DULOXETINE (CYMBALTA) 30 MG CAPSULE    TAKE 1  CAPSULE EVERY DAY    EVENING PRIMROSE OIL ORAL    Take 1,000 mg by mouth once daily.    FAMOTIDINE (PEPCID) 40 MG TABLET    Take 1 tablet (40 mg total) by mouth every other day.    FUROSEMIDE (LASIX) 20 MG TABLET    Take 1 tablet (20 mg total) by mouth 2 (two) times a day for 5 days, THEN 1 tablet (20 mg total) once daily. Take 1 tablet daily.    HYDRALAZINE (APRESOLINE) 50 MG TABLET    TAKE 2 TABLETS  EVERY 8  HOURS.    LOSARTAN (COZAAR) 25 MG TABLET    Take 1 tablet (25 mg total) by mouth once daily.    MULTIVITAMIN CAPSULE    Take 1 capsule by mouth once daily.    ONDANSETRON (ZOFRAN-ODT) 8 MG TBDL    Take 1 tablet (8 mg total) by mouth every 8 (eight) hours as needed (nausea).    PANTOPRAZOLE (PROTONIX) 40 MG TABLET    Take 1 tablet (40 mg total) by mouth once daily.    PREGABALIN (LYRICA) 50 MG CAPSULE    Take 1 capsule (50 mg total) by mouth 2 (two) times daily. NOON & NIGHT TIME    SUCRALFATE (CARAFATE) 1 GRAM TABLET    Take 1 tablet (1 g total) by mouth 4 (four) times daily before meals and nightly.    TEMAZEPAM (RESTORIL) 30 MG CAPSULE    Take 1 at bedtime    VITAMIN E 400 UNIT CAPSULE    Take 400 Units by mouth once daily.    ZOLPIDEM (AMBIEN) 10 MG TAB    TAKE 1 TABLET BY MOUTH ONCE DAILY IN THE EVENING     Objective:     Vitals:    04/13/22 1349   BP: (!) 155/95   Pulse: 103   Temp: 97.6 °F (36.4 °C)       Physical Exam  Constitutional:       General: She is not in acute distress.     Appearance: She is not ill-appearing, toxic-appearing or diaphoretic.   Neurological:      Mental Status: She is alert.   Psychiatric:         Mood and Affect: Mood normal.         Behavior: Behavior normal.         Thought Content: Thought content normal.          Physical exam limited due to video visit.     Labs/Results:  Lab Results   Component Value Date    WBC 3.17 (L) 04/06/2022    RBC 3.67 (L) 04/06/2022    HGB 10.0 (L) 04/06/2022    HCT 31.7 (L) 04/06/2022    MCV 86 04/06/2022    MCH 27.2 04/06/2022    MCHC 31.5  (L) 04/06/2022    RDW 15.1 (H) 04/06/2022     04/06/2022    MPV 9.5 04/06/2022    GRAN 2.1 04/06/2022    GRAN 66.7 04/06/2022    LYMPH 0.4 (L) 04/06/2022    LYMPH 12.3 (L) 04/06/2022    MONO 0.5 04/06/2022    MONO 14.8 04/06/2022    EOS 0.2 04/06/2022    BASO 0.02 04/06/2022    EOSINOPHIL 5.0 04/06/2022    BASOPHIL 0.6 04/06/2022     CMP  Sodium   Date Value Ref Range Status   04/06/2022 137 136 - 145 mmol/L Final     Potassium   Date Value Ref Range Status   04/06/2022 4.3 3.5 - 5.1 mmol/L Final     Chloride   Date Value Ref Range Status   04/06/2022 100 95 - 110 mmol/L Final     CO2   Date Value Ref Range Status   04/06/2022 22 (L) 23 - 29 mmol/L Final     Glucose   Date Value Ref Range Status   04/06/2022 105 70 - 110 mg/dL Final     BUN   Date Value Ref Range Status   04/06/2022 42 (H) 8 - 23 mg/dL Final     Creatinine   Date Value Ref Range Status   04/06/2022 2.6 (H) 0.5 - 1.4 mg/dL Final     Calcium   Date Value Ref Range Status   04/06/2022 9.5 8.7 - 10.5 mg/dL Final     Total Protein   Date Value Ref Range Status   04/06/2022 8.2 6.0 - 8.4 g/dL Final     Albumin   Date Value Ref Range Status   04/06/2022 3.6 3.5 - 5.2 g/dL Final     Total Bilirubin   Date Value Ref Range Status   04/06/2022 0.7 0.1 - 1.0 mg/dL Final     Comment:     For infants and newborns, interpretation of results should be based  on gestational age, weight and in agreement with clinical  observations.    Premature Infant recommended reference ranges:  Up to 24 hours.............<8.0 mg/dL  Up to 48 hours............<12.0 mg/dL  3-5 days..................<15.0 mg/dL  6-29 days.................<15.0 mg/dL       Alkaline Phosphatase   Date Value Ref Range Status   04/06/2022 106 55 - 135 U/L Final     AST   Date Value Ref Range Status   04/06/2022 38 10 - 40 U/L Final     ALT   Date Value Ref Range Status   04/06/2022 22 10 - 44 U/L Final     Anion Gap   Date Value Ref Range Status   04/06/2022 15 8 - 16 mmol/L Final     eGFR if     Date Value Ref Range Status   04/06/2022 21 (A) >60 mL/min/1.73 m^2 Final     eGFR if non    Date Value Ref Range Status   04/06/2022 18 (A) >60 mL/min/1.73 m^2 Final     Comment:     Calculation used to obtain the estimated glomerular filtration  rate (eGFR) is the CKD-EPI equation.          Mammogram diag left 2/9/22  Impression:  Left  Cyst: Left breast 41 mm x 36 mm x 33 mm cyst at the 3 o'clock position. Assessment: 3 - Probably benign. Short Interval Follow-Up in 3 Months is recommended.   BI-RADS Category:   Overall: 3 - Probably Benign      Assessment:     Problem List Items Addressed This Visit        Cardiac/Vascular    Heart transplanted - Primary (Chronic)       Renal/    Anemia associated with stage 4 chronic renal failure       Immunology/Multi System    Immunocompromised patient       Oncology    Iron deficiency anemia    Ductal carcinoma in situ (DCIS) of left breast        Plan:     Ductal carcinoma in situ (DCIS) of left breast  --continue follow up with radiation oncology  --mammogram and US of left breast done in 2/9/22    Iron deficiency anemia, unspecified iron deficiency anemia type  --iron labs to be drawn     Anemia associated with stage 4 chronic renal failure  --hgb: 10, hmt: 31.7%  --epo today  --epo 10kU for hgb<10g/dL  --kidney function consistent       Follow-Up: 1 month labs(cbc, cmp, iron/tibc, ferritin)--will call with results and need for epo then RTC in 2 months with labs for possible epo with cbc and cmp    Kelsie Burk PA-C    The patient location is: [facility]  The chief complaint leading to consultation is: anemia ckd      Visit type:  Synchronous audio video      Face to Face time with patient: 15 minutes of total time spent on the encounter, which includes face to face time and non-face to face time preparing to see the patient (eg, review of tests), Obtaining and/or reviewing separately obtained history, Documenting clinical information in  the electronic or other health record, Independently interpreting results (not separately reported) and communicating results to the patient/family/caregiver, or Care coordination (not separately reported).      Each patient to whom he or she provides medical services by telemedicine is:  (1) informed of the relationship between the provider and patient and the respective role of any other health care provider with respect to management of the patient; and (2) notified that he or she may decline to receive medical services

## 2022-04-13 NOTE — PATIENT INSTRUCTIONS
Continue pantoprazole as prescribed.     In one month if no further heartburn/reflux symptoms then okay to take Carafate as needed.    In two months if no reflux symptoms then okay to change famotidine (Pepcid) to every other day AS NEEDED.

## 2022-04-14 ENCOUNTER — DOCUMENTATION ONLY (OUTPATIENT)
Dept: RADIATION ONCOLOGY | Facility: CLINIC | Age: 67
End: 2022-04-14
Payer: MEDICARE

## 2022-04-14 PROCEDURE — G6002 PR STEREOSCOPIC XRAY GUIDE FOR RADIATION TX DELIV: ICD-10-PCS | Mod: 26,,, | Performed by: RADIOLOGY

## 2022-04-14 PROCEDURE — 77412 RADIATION TX DELIVERY LVL 3: CPT | Performed by: RADIOLOGY

## 2022-04-14 PROCEDURE — 77336 RADIATION PHYSICS CONSULT: CPT | Performed by: RADIOLOGY

## 2022-04-14 PROCEDURE — G6002 STEREOSCOPIC X-RAY GUIDANCE: HCPCS | Mod: 26,,, | Performed by: RADIOLOGY

## 2022-04-14 PROCEDURE — 77387 GUIDANCE FOR RADJ TX DLVR: CPT | Mod: TC | Performed by: RADIOLOGY

## 2022-04-18 DIAGNOSIS — F51.01 PRIMARY INSOMNIA: ICD-10-CM

## 2022-04-18 RX ORDER — ZOLPIDEM TARTRATE 10 MG/1
TABLET ORAL
Qty: 90 TABLET | Refills: 0 | Status: SHIPPED | OUTPATIENT
Start: 2022-04-18 | End: 2022-07-13 | Stop reason: SDUPTHER

## 2022-04-18 NOTE — TELEPHONE ENCOUNTER
----- Message from Deysi Walker sent at 4/18/2022 11:30 AM CDT -----  Contact: Patient, [phone]  Requesting an RX refill or new RX.  Is this a refill or new RX: Refill  RX name and strength (copy/paste from chart):  zolpidem (AMBIEN) 10 mg Tab  Is this a 30 day or 90 day RX: 90  Pharmacy name and phone # (copy/paste from chart):    [id number] - [place] - [address]  [address] [address]  [place] [address]  Phone: [phone] Fax: [phone]  The doctors have asked that we provide their patients with the following 2 reminders -- prescription refills can take up to 72 hours, and a friendly reminder that in the future you can use your [facility] account to request refills: Yes

## 2022-04-18 NOTE — TELEPHONE ENCOUNTER
Patient was informed of her zolpidem (AMBIEN) 10 mg Tab being sent to the pharmacy & verbally understood the information.

## 2022-04-21 ENCOUNTER — TELEPHONE (OUTPATIENT)
Dept: RADIATION ONCOLOGY | Facility: CLINIC | Age: 67
End: 2022-04-21
Payer: MEDICARE

## 2022-04-21 NOTE — TELEPHONE ENCOUNTER
Made a 1 week f/u call to check on patient since she completed xrt to the breast last week. She said she is doing well but the breast does still have burning to it & under the arm where it has been burned. She is still using the Miaderm Cream to the treatment site & Aquaphor under the breast where it was peeling, she is also still using the Hydrogel sheets she was given in clinic& it does help some. She said she realizes it's just gonna take time. Informed her if she needs anything in the meantime to let us know, she verbalized understanding.   ----- Message from Vanessa Rubio RN sent at 4/14/2022  2:00 PM CDT -----  Regarding: Make 1 week f/u call

## 2022-05-02 ENCOUNTER — TELEPHONE (OUTPATIENT)
Dept: PRIMARY CARE CLINIC | Facility: CLINIC | Age: 67
End: 2022-05-02
Payer: MEDICARE

## 2022-05-02 ENCOUNTER — OFFICE VISIT (OUTPATIENT)
Dept: PRIMARY CARE CLINIC | Facility: CLINIC | Age: 67
End: 2022-05-02
Payer: MEDICARE

## 2022-05-02 ENCOUNTER — LAB VISIT (OUTPATIENT)
Dept: LAB | Facility: HOSPITAL | Age: 67
End: 2022-05-02
Attending: NURSE PRACTITIONER
Payer: MEDICARE

## 2022-05-02 VITALS
SYSTOLIC BLOOD PRESSURE: 136 MMHG | BODY MASS INDEX: 30.02 KG/M2 | WEIGHT: 163.13 LBS | HEIGHT: 62 IN | TEMPERATURE: 98 F | OXYGEN SATURATION: 97 % | DIASTOLIC BLOOD PRESSURE: 82 MMHG | HEART RATE: 93 BPM

## 2022-05-02 DIAGNOSIS — R30.0 DYSURIA: Primary | ICD-10-CM

## 2022-05-02 DIAGNOSIS — R30.0 DYSURIA: ICD-10-CM

## 2022-05-02 DIAGNOSIS — R10.11 RIGHT UPPER QUADRANT ABDOMINAL PAIN: ICD-10-CM

## 2022-05-02 LAB
ALBUMIN SERPL BCP-MCNC: 3.3 G/DL (ref 3.5–5.2)
ALBUMIN SERPL BCP-MCNC: 3.3 G/DL (ref 3.5–5.2)
ALP SERPL-CCNC: 122 U/L (ref 55–135)
ALT SERPL W/O P-5'-P-CCNC: 17 U/L (ref 10–44)
AMYLASE SERPL-CCNC: 103 U/L (ref 20–110)
ANION GAP SERPL CALC-SCNC: 9 MMOL/L (ref 8–16)
AST SERPL-CCNC: 33 U/L (ref 10–40)
BASOPHILS # BLD AUTO: 0.02 K/UL (ref 0–0.2)
BASOPHILS NFR BLD: 0.5 % (ref 0–1.9)
BILIRUB DIRECT SERPL-MCNC: 0.3 MG/DL (ref 0.1–0.3)
BILIRUB SERPL-MCNC: 0.6 MG/DL (ref 0.1–1)
BUN SERPL-MCNC: 34 MG/DL (ref 8–23)
CALCIUM SERPL-MCNC: 9.1 MG/DL (ref 8.7–10.5)
CHLORIDE SERPL-SCNC: 105 MMOL/L (ref 95–110)
CO2 SERPL-SCNC: 26 MMOL/L (ref 23–29)
CREAT SERPL-MCNC: 2.4 MG/DL (ref 0.5–1.4)
DIFFERENTIAL METHOD: ABNORMAL
EOSINOPHIL # BLD AUTO: 0.4 K/UL (ref 0–0.5)
EOSINOPHIL NFR BLD: 10.6 % (ref 0–8)
ERYTHROCYTE [DISTWIDTH] IN BLOOD BY AUTOMATED COUNT: 14.9 % (ref 11.5–14.5)
EST. GFR  (AFRICAN AMERICAN): 23.4 ML/MIN/1.73 M^2
EST. GFR  (NON AFRICAN AMERICAN): 20.3 ML/MIN/1.73 M^2
GLUCOSE SERPL-MCNC: 83 MG/DL (ref 70–110)
HCT VFR BLD AUTO: 32.9 % (ref 37–48.5)
HGB BLD-MCNC: 10.1 G/DL (ref 12–16)
IMM GRANULOCYTES # BLD AUTO: 0.03 K/UL (ref 0–0.04)
IMM GRANULOCYTES NFR BLD AUTO: 0.8 % (ref 0–0.5)
LIPASE SERPL-CCNC: 49 U/L (ref 4–60)
LYMPHOCYTES # BLD AUTO: 0.3 K/UL (ref 1–4.8)
LYMPHOCYTES NFR BLD: 8.7 % (ref 18–48)
MCH RBC QN AUTO: 27.2 PG (ref 27–31)
MCHC RBC AUTO-ENTMCNC: 30.7 G/DL (ref 32–36)
MCV RBC AUTO: 88 FL (ref 82–98)
MONOCYTES # BLD AUTO: 0.6 K/UL (ref 0.3–1)
MONOCYTES NFR BLD: 16.6 % (ref 4–15)
NEUTROPHILS # BLD AUTO: 2.4 K/UL (ref 1.8–7.7)
NEUTROPHILS NFR BLD: 62.8 % (ref 38–73)
NRBC BLD-RTO: 0 /100 WBC
PHOSPHATE SERPL-MCNC: 4.1 MG/DL (ref 2.7–4.5)
PLATELET # BLD AUTO: 246 K/UL (ref 150–450)
PMV BLD AUTO: 10.9 FL (ref 9.2–12.9)
POTASSIUM SERPL-SCNC: 4.9 MMOL/L (ref 3.5–5.1)
PROT SERPL-MCNC: 7.9 G/DL (ref 6–8.4)
RBC # BLD AUTO: 3.72 M/UL (ref 4–5.4)
SODIUM SERPL-SCNC: 140 MMOL/L (ref 136–145)
WBC # BLD AUTO: 3.79 K/UL (ref 3.9–12.7)

## 2022-05-02 PROCEDURE — 3075F SYST BP GE 130 - 139MM HG: CPT | Mod: CPTII,S$GLB,, | Performed by: NURSE PRACTITIONER

## 2022-05-02 PROCEDURE — 3288F PR FALLS RISK ASSESSMENT DOCUMENTED: ICD-10-PCS | Mod: CPTII,S$GLB,, | Performed by: NURSE PRACTITIONER

## 2022-05-02 PROCEDURE — 99213 OFFICE O/P EST LOW 20 MIN: CPT | Mod: S$GLB,,, | Performed by: NURSE PRACTITIONER

## 2022-05-02 PROCEDURE — 1159F PR MEDICATION LIST DOCUMENTED IN MEDICAL RECORD: ICD-10-PCS | Mod: CPTII,S$GLB,, | Performed by: NURSE PRACTITIONER

## 2022-05-02 PROCEDURE — 3008F PR BODY MASS INDEX (BMI) DOCUMENTED: ICD-10-PCS | Mod: CPTII,S$GLB,, | Performed by: NURSE PRACTITIONER

## 2022-05-02 PROCEDURE — 3079F DIAST BP 80-89 MM HG: CPT | Mod: CPTII,S$GLB,, | Performed by: NURSE PRACTITIONER

## 2022-05-02 PROCEDURE — 99213 PR OFFICE/OUTPT VISIT, EST, LEVL III, 20-29 MIN: ICD-10-PCS | Mod: S$GLB,,, | Performed by: NURSE PRACTITIONER

## 2022-05-02 PROCEDURE — 3288F FALL RISK ASSESSMENT DOCD: CPT | Mod: CPTII,S$GLB,, | Performed by: NURSE PRACTITIONER

## 2022-05-02 PROCEDURE — 1101F PR PT FALLS ASSESS DOC 0-1 FALLS W/OUT INJ PAST YR: ICD-10-PCS | Mod: CPTII,S$GLB,, | Performed by: NURSE PRACTITIONER

## 2022-05-02 PROCEDURE — 3075F PR MOST RECENT SYSTOLIC BLOOD PRESS GE 130-139MM HG: ICD-10-PCS | Mod: CPTII,S$GLB,, | Performed by: NURSE PRACTITIONER

## 2022-05-02 PROCEDURE — 84075 ASSAY ALKALINE PHOSPHATASE: CPT | Performed by: NURSE PRACTITIONER

## 2022-05-02 PROCEDURE — 4010F PR ACE/ARB THEARPY RXD/TAKEN: ICD-10-PCS | Mod: CPTII,S$GLB,, | Performed by: NURSE PRACTITIONER

## 2022-05-02 PROCEDURE — 99999 PR PBB SHADOW E&M-EST. PATIENT-LVL V: ICD-10-PCS | Mod: PBBFAC,,, | Performed by: NURSE PRACTITIONER

## 2022-05-02 PROCEDURE — 1157F PR ADVANCE CARE PLAN OR EQUIV PRESENT IN MEDICAL RECORD: ICD-10-PCS | Mod: CPTII,S$GLB,, | Performed by: NURSE PRACTITIONER

## 2022-05-02 PROCEDURE — 3066F NEPHROPATHY DOC TX: CPT | Mod: CPTII,S$GLB,, | Performed by: NURSE PRACTITIONER

## 2022-05-02 PROCEDURE — 1159F MED LIST DOCD IN RCRD: CPT | Mod: CPTII,S$GLB,, | Performed by: NURSE PRACTITIONER

## 2022-05-02 PROCEDURE — 3066F PR DOCUMENTATION OF TREATMENT FOR NEPHROPATHY: ICD-10-PCS | Mod: CPTII,S$GLB,, | Performed by: NURSE PRACTITIONER

## 2022-05-02 PROCEDURE — 82150 ASSAY OF AMYLASE: CPT | Performed by: NURSE PRACTITIONER

## 2022-05-02 PROCEDURE — 1157F ADVNC CARE PLAN IN RCRD: CPT | Mod: CPTII,S$GLB,, | Performed by: NURSE PRACTITIONER

## 2022-05-02 PROCEDURE — 36415 COLL VENOUS BLD VENIPUNCTURE: CPT | Performed by: NURSE PRACTITIONER

## 2022-05-02 PROCEDURE — 4010F ACE/ARB THERAPY RXD/TAKEN: CPT | Mod: CPTII,S$GLB,, | Performed by: NURSE PRACTITIONER

## 2022-05-02 PROCEDURE — 80069 RENAL FUNCTION PANEL: CPT | Performed by: NURSE PRACTITIONER

## 2022-05-02 PROCEDURE — 3079F PR MOST RECENT DIASTOLIC BLOOD PRESSURE 80-89 MM HG: ICD-10-PCS | Mod: CPTII,S$GLB,, | Performed by: NURSE PRACTITIONER

## 2022-05-02 PROCEDURE — 85025 COMPLETE CBC W/AUTO DIFF WBC: CPT | Performed by: NURSE PRACTITIONER

## 2022-05-02 PROCEDURE — 83690 ASSAY OF LIPASE: CPT | Performed by: NURSE PRACTITIONER

## 2022-05-02 PROCEDURE — 1125F AMNT PAIN NOTED PAIN PRSNT: CPT | Mod: CPTII,S$GLB,, | Performed by: NURSE PRACTITIONER

## 2022-05-02 PROCEDURE — 1101F PT FALLS ASSESS-DOCD LE1/YR: CPT | Mod: CPTII,S$GLB,, | Performed by: NURSE PRACTITIONER

## 2022-05-02 PROCEDURE — 1125F PR PAIN SEVERITY QUANTIFIED, PAIN PRESENT: ICD-10-PCS | Mod: CPTII,S$GLB,, | Performed by: NURSE PRACTITIONER

## 2022-05-02 PROCEDURE — 99999 PR PBB SHADOW E&M-EST. PATIENT-LVL V: CPT | Mod: PBBFAC,,, | Performed by: NURSE PRACTITIONER

## 2022-05-02 PROCEDURE — 3008F BODY MASS INDEX DOCD: CPT | Mod: CPTII,S$GLB,, | Performed by: NURSE PRACTITIONER

## 2022-05-02 NOTE — TELEPHONE ENCOUNTER
----- Message from Loraine Matta sent at 5/2/2022  8:50 AM CDT -----  Contact: Patient [phone]  [email or web address]     Patient is calling for Medical Advice regarding:Side pain urine has an odor     How long has patient had these symptoms:1 week     Pharmacy name and phone#:[id number] - [place] - [address] [facility] [address]   Phone:  [phone]  Fax:  [phone]          Would like response via [facility]: Call    Comments:

## 2022-05-02 NOTE — PROGRESS NOTES
Nadia Damon  05/02/2022  [id number]    Luz Dickson NP  Patient Care Team:  Luz Dickson NP as PCP - General (Family Medicine)  Miguel Soni Jr., MD as Consulting Physician (Vascular Surgery)  Ike King MD as Consulting Physician (Cardiology)  Courtney Tubbs MD as Consulting Physician (Cardiology)  Carter Crawford MD as Consulting Physician (Nephrology)  Paxton Vasques OD as Consulting Physician (Optometry)  PRANAY Villalobos MD as Obstetrician (Obstetrics)  Parker Mccarthy IV, MD (Urology)  Ike King MD as Consulting Physician (Cardiology)  Jose Roland MD as Consulting Physician (Dermatology)  Prasanth Johnson MD as Consulting Physician (Rheumatology)  Ayanna Hrat RN as Oncology Navigator  Nora Morin LMSW as         [facility] Primary Care Note      Chief Complaint:  Chief Complaint   Patient presents with    Urine odor    RT side & back pain        History of Present Illness:  HPI    Right flank pain and strong odor to urine. Onset about a week ago. Started increasing PO fluids, sx not improving. Pain worse with direct pressure and movement. Some relief with heat. Regular BMs, large, soft BM today.     Saw cardiology who recommended resuming 6.25mg carvedilol BID. Dr King had previously increased dose.     Good relief of GERD sx with Carafate.         Review of Systems   Constitutional: Negative for chills, fever and malaise/fatigue.   Respiratory: Negative for cough and shortness of breath.    Cardiovascular: Negative for chest pain.   Gastrointestinal: Negative for abdominal pain, constipation, diarrhea, heartburn, nausea and vomiting.   Genitourinary: Positive for flank pain. Negative for frequency, hematuria and urgency.   Musculoskeletal: Negative for myalgias.   Neurological: Negative for dizziness and headaches.         The following were reviewed: Active problem list, medication list, allergies, family history, social history, and Health  Maintenance.     History:  Past Medical History:   Diagnosis Date    Abdominal wall hernia     CT Renal 6/11/2018---Small fat containing superior ventral abdominal wall hernia at the epicardial pacing lead site.    Anxiety     Arthritis     ZEN HIPS    Breast cancer in female 08/2021    LEFT BREAST    Cellulitis of axilla, left 12/23/2021    Chronic diastolic heart failure 12/16/2021    Chronic kidney disease     stage 4, GFR 15-29 ml/min    Chronic midline low back pain without sciatica 6/18/2018    Coronary artery disease 1993    heart transplant    Depression     Fibromyalgia     on Lyrica    Heart failure     native heart cardiomyopathy    Heart transplanted 1993    due to cardiomyopathy    History of hyperparathyroidism; Hyperparathyroidism, secondary renal     PT DENIES    Hypertension     Immune disorder     anti rejection meds    Iron deficiency anemia 8/15/2017    Kidney stones     passed per pt    Obesity     Other osteoporosis without current pathological fracture 8/30/2019    Shingles 2003 approx    left leg    Trouble in sleeping     Urinary incontinence      Past Surgical History:   Procedure Laterality Date    BLADDER SURGERY  2015 approx    mesh - Dr Everett then 2nd reconstructive sx Dr Onofre    BREAST BIOPSY Bilateral     NEGATIVE    BREAST SURGERY Left 9/28/15    Bx - benign    BREAST SURGERY Right 12/2015    Bx benign    CARDIAC PACEMAKER REMOVAL Left 06/26/2014    Pacer defirillator removed. Put in 1993 aat time of heart transplant    CARPAL TUNNEL RELEASE Left 03/03/15    Dr. Hall    COLONOSCOPY N/A 2/25/2021    Procedure: COLONOSCOPY;  Surgeon: Freida Ramirez MD;  Location[facility];  Service: Endoscopy;  Laterality: N/A;    HEART TRANSPLANT  1993    HERNIA REPAIR Right 1971 approx    Inguinal    HYSTERECTOMY  1983    vag hyst /LSO     INCISION AND DRAINAGE OF ABSCESS Left 12/24/2021    Procedure: INCISION AND DRAINAGE, ABSCESS;  Surgeon: Joseph  MD Qing;  Location: [facility] OR;  Service: General;  Laterality: Left;    INSERTION OF TUNNELED CENTRAL VENOUS CATHETER (CVC) WITH SUBCUTANEOUS PORT N/A 2021    Procedure: [id number];  Surgeon: Christoph Douglas MD;  Location: [facility] OR;  Service: General;  Laterality: N/A;    REMOVAL OF VASCULAR ACCESS PORT      SENTINEL LYMPH NODE BIOPSY Left 10/12/2021    Procedure: BIOPSY, LYMPH NODE, SENTINEL;  Surgeon: Christoph Douglas MD;  Location: [facility] OR;  Service: General;  Laterality: Left;    TOE SURGERY       Family History   Problem Relation Age of Onset    Cancer Mother 38        breast    Breast cancer Mother     Heart disease Maternal Grandmother     Cataracts Cousin     Hypertension Son     Diabetes Neg Hx     Stroke Neg Hx     Kidney disease Neg Hx     Asthma Neg Hx     COPD Neg Hx     Melanoma Neg Hx     Hyperlipidemia Neg Hx      Social History     Socioeconomic History    Marital status: Single    Number of children: 2    Highest education level: 11th grade   Occupational History    Occupation: Retired   Tobacco Use    Smoking status: Never Smoker    Smokeless tobacco: Never Used   Substance and Sexual Activity    Alcohol use: Never     Alcohol/week: 0.0 standard drinks    Drug use: No    Sexual activity: Not Currently     Partners: Male     Birth control/protection: See Surgical Hx   Other Topics Concern    Are you pregnant or think you may be? No    Breast-feeding No   Social History Narrative    Single. 2 children , 1  at 30 yoa   strep throat -  pneumonia and renal complications after not completing course of AB. Other child lives in [place], [place]. Has a cousin locally that could help in an emergency. 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 Caffeine intake =- 1 cola a day. No coffee, + occasional tea, avoids caffeine especially at night. Still drives. She does not have a Living Will or Advanced directive.      Social Determinants  of Health     Financial Resource Strain: Low Risk     Difficulty of Paying Living Expenses: Not hard at all   Food Insecurity: No Food Insecurity    Worried About Running Out of Food in the Last Year: Never true    Ran Out of Food in the Last Year: Never true   Transportation Needs: No Transportation Needs    Lack of Transportation (Medical): No    Lack of Transportation (Non-Medical): No   Physical Activity: Insufficiently Active    Days of Exercise per Week: 2 days    Minutes of Exercise per Session: 10 min   Stress: No Stress Concern Present    Feeling of Stress : Not at all   Social Connections: Socially Isolated    Frequency of Communication with Friends and Family: Once a week    Frequency of Social Gatherings with Friends and Family: Once a week    Attends [employer] Services: More than 4 times per year    Active Member of Clubs or Organizations: No    Attends Club or Organization Meetings: Never    Marital Status: Never    Housing Stability: Low Risk     Unable to Pay for Housing in the Last Year: No    Number of Places Lived in the Last Year: 1    Unstable Housing in the Last Year: No     Patient Active Problem List   Diagnosis    Heart transplanted    Anxiety    Insomnia    CKD (chronic kidney disease) stage 4, GFR 15-29 ml/min    Immunosuppression due to drug therapy    Fibromyalgia    Gastroesophageal reflux disease without esophagitis    Breast screening    Immunization deficiency    Essential hypertension    Aortic atherosclerosis    Chronic major depressive disorder, recurrent episode    Iron deficiency anemia    Vaginal atrophy    Hyperparathyroidism    Hx of falling    Chronic midline low back pain without sciatica    Closed nondisplaced fracture of distal phalanx of left great toe with routine healing    Lightheadedness    Medication side effects    Obesity (BMI 30.0-34.9)    Edema    Asymptomatic menopausal state     Other osteoporosis without current  pathological fracture    Cough    Abnormal CT scan, kidney    Weakness of both lower extremities    Immunocompromised patient    Osteoporosis, post-menopausal    Ductal carcinoma in situ (DCIS) of left breast    Immunodeficiency secondary to radiation therapy    Abnormal mammogram    The hangs, uncomplicated    Severe sepsis    GRACE (acute kidney injury)    Diarrhea    Thrombocytopenia, unspecified    Immunodeficiency due to chemotherapy    C. difficile colitis    Strain of left shoulder    Left shoulder pain    Ulnar neuropathy of left upper extremity    Anemia associated with stage 4 chronic renal failure    Secondary and unspecified malignant neoplasm of axilla and upper limb lymph nodes    Other chest pain     Review of patient's allergies indicates:   Allergen Reactions    Lisinopril Swelling and Rash    Augmentin [amoxicillin-pot clavulanate] Diarrhea       Medications:  Current Outpatient Medications on File Prior to Visit   Medication Sig Dispense Refill    acetaminophen (TYLENOL) 325 MG tablet Take 1 tablet (325 mg total) by mouth every 6 (six) hours as needed for Pain.  0    amLODIPine (NORVASC) 2.5 MG tablet       aspirin (ECOTRIN) 81 MG EC tablet Take 1 tablet (81 mg total) by mouth once daily. 90 tablet 3    atenoloL (TENORMIN) 50 MG tablet Take 1 tablet (50 mg total) by mouth once daily. 30 tablet 0    biotin 10,000 mcg Cap Take 1 tablet by mouth once daily.      busPIRone (BUSPAR) 10 MG tablet Take 1 tablet (10 mg total) by mouth once daily. 90 tablet 3    calcitonin, salmon, (FORTICAL) 200 unit/actuation nasal spray 1 spray by Nasal route once daily. 3.7 mL 3    carvediloL (COREG) 12.5 MG tablet 6.25 mg. 6.25 mg twice per day.      cloNIDine (CATAPRES) 0.1 MG tablet Take 1 tablet (0.1 mg total) by mouth nightly as needed (BP >150/95). 90 tablet 3    cycloSPORINE modified, NEORAL, (NEORAL) 25 MG capsule TAKE 3 CAPSULES (75 MG TOTAL) BY MOUTH 2 (TWO) TIMES DAILY. 540  capsule 6    DULoxetine (CYMBALTA) 30 MG capsule TAKE 1 CAPSULE EVERY DAY 90 capsule 1    EVENING PRIMROSE OIL ORAL Take 1,000 mg by mouth once daily.      furosemide (LASIX) 20 MG tablet Take 1 tablet (20 mg total) by mouth 2 (two) times a day for 5 days, THEN 1 tablet (20 mg total) once daily. Take 1 tablet daily. 370 tablet 0    hydrALAZINE (APRESOLINE) 50 MG tablet Take 1 tablet (50 mg total) by mouth every 8 (eight) hours. TAKE 1 TABLETS  EVERY 8  HOURS. 270 tablet 3    losartan (COZAAR) 25 MG tablet Take 1 tablet (25 mg total) by mouth once daily. 90 tablet 3    multivitamin capsule Take 1 capsule by mouth once daily.      ondansetron (ZOFRAN-ODT) 8 MG TbDL Take 1 tablet (8 mg total) by mouth every 8 (eight) hours as needed (nausea). 60 tablet 5    pantoprazole (PROTONIX) 40 MG tablet Take 1 tablet (40 mg total) by mouth once daily. 90 tablet 1    pregabalin (LYRICA) 50 MG capsule Take 1 capsule (50 mg total) by mouth 2 (two) times daily. NOON & NIGHT TIME 180 capsule 1    sucralfate (CARAFATE) 1 gram tablet Take 1 tablet (1 g total) by mouth 4 (four) times daily before meals and nightly. 120 tablet 0    temazepam (RESTORIL) 30 mg capsule Take 1 at bedtime 90 capsule 1    vitamin E 400 UNIT capsule Take 400 Units by mouth once daily.      zolpidem (AMBIEN) 10 mg Tab TAKE 1 TABLET BY MOUTH ONCE DAILY IN THE EVENING 90 tablet 0    [DISCONTINUED] famotidine (PEPCID) 40 MG tablet Take 1 tablet (40 mg total) by mouth every other day. 15 tablet 2     Current Facility-Administered Medications on File Prior to Visit   Medication Dose Route Frequency Provider Last Rate Last Admin    denosumab (PROLIA) injection 60 mg  60 mg Subcutaneous Q6 Months Prasanth Johnson MD        lactated ringers infusion   Intravenous Continuous Jennifer Carr MD 10 mL/hr at 11/09/21 0717 Restarted at 11/09/21 0820       Medications have been reviewed and reconciled with patient at visit today.    Barriers to medications  present (no )    Adverse reactions to current medications (no)      Exam:  Vitals:    05/02/22 1431   BP: 136/82   Pulse: 93   Temp: 98 °F (36.7 °C)     Weight: 74 kg (163 lb 2.3 oz)   Body mass index is 29.84 kg/m².      BP Readings from Last 3 Encounters:   05/02/22 136/82   04/13/22 138/84   04/13/22 (!) 147/89     Wt Readings from Last 3 Encounters:   05/02/22 1431 74 kg (163 lb 2.3 oz)   04/13/22 1511 72.9 kg (160 lb 11.5 oz)   04/13/22 1435 78 kg (171 lb 15.3 oz)            Physical Exam  Constitutional:       General: She is not in acute distress.  HENT:      Nose: Nose normal.      Mouth/Throat:      Mouth: Mucous membranes are moist.   Eyes:      General: No scleral icterus.  Cardiovascular:      Rate and Rhythm: Normal rate and regular rhythm.      Heart sounds: Normal heart sounds.   Pulmonary:      Effort: No respiratory distress.      Breath sounds: Normal breath sounds.   Abdominal:      General: Bowel sounds are normal. There is no distension.      Palpations: Abdomen is soft. There is no mass.      Tenderness: There is abdominal tenderness (RUQ and epigastrum). There is right CVA tenderness. There is no left CVA tenderness, guarding or rebound.   Musculoskeletal:         General: No swelling.      Cervical back: Neck supple.   Skin:     General: Skin is warm and dry.   Neurological:      Mental Status: She is alert. Mental status is at baseline.   Psychiatric:         Mood and Affect: Mood normal.         Behavior: Behavior normal.         Thought Content: Thought content normal.         Judgment: Judgment normal.         Laboratory Reviewed: (Yes)  Lab Results   Component Value Date    WBC 3.17 (L) 04/06/2022    HGB 10.0 (L) 04/06/2022    HCT 31.7 (L) 04/06/2022     04/06/2022    CHOL 157 12/24/2021    TRIG 120 12/24/2021    HDL 42 12/24/2021    ALT 22 04/06/2022    AST 38 04/06/2022     04/06/2022    K 4.3 04/06/2022     04/06/2022    CREATININE 2.6 (H) 04/06/2022    BUN 42 (H)  04/06/2022    CO2 22 (L) 04/06/2022    TSH 5.158 (H) 12/24/2021    PSA <0.1 05/27/2008    INR 1.0 11/22/2021    HGBA1C 5.2 12/24/2021           Health Maintenance  Health Maintenance Topics with due status: Not Due       Topic Last Completion Date    Pneumococcal Vaccines (Age 65+) 10/22/2018    TETANUS VACCINE 09/09/2020    Colorectal Cancer Screening 02/25/2021    DEXA Scan 08/03/2021    Lipid Panel 12/24/2021    Mammogram 02/09/2022     Health Maintenance Due   Topic Date Due    COVID-19 Vaccine (3 - Moderna risk series) 11/25/2021       Assessment:  Problem List Items Addressed This Visit    None     Visit Diagnoses     Dysuria    -  Primary    Relevant Orders    Urinalysis, Reflex to Urine Culture Urine, Clean Catch    CBC Auto Differential    Right upper quadrant abdominal pain        Relevant Orders    Hepatic Function Panel    Renal Function Panel    Amylase    Lipase            Plan:  Dysuria  -     Cancel: Comprehensive Metabolic Panel; Future; Expected date: 05/02/2022  -     Urinalysis, Reflex to Urine Culture Urine, Clean Catch; Future; Expected date: 05/02/2022  -     CBC Auto Differential; Future; Expected date: 05/02/2022    Right upper quadrant abdominal pain  -     Hepatic Function Panel; Future; Expected date: 05/02/2022  -     Renal Function Panel; Future; Expected date: 05/02/2022  -     Amylase; Future; Expected date: 05/02/2022  -     Lipase; Future; Expected date: 05/02/2022      -Patient's lab results were reviewed and discussed with patient  -Treatment options and alternatives were discussed with the patient. Patient expressed understanding. Patient was given the opportunity to ask questions and be an active participant in their medical care. Patient had no further questions or concerns at this time.   -Documentation of patient's health and condition was obtained from family member who was present during visit.  -Patient is an overall moderate risk for health complications from their  medical conditions.       Follow up: Follow up depends on test results..      After visit summary printed and given to patient upon discharge.  Patient goals and care plan are included in After visit summary.    Total medical decision making time was 28 min.  The following issues were discussed: The primary encounter diagnosis was Dysuria. A diagnosis of Right upper quadrant abdominal pain was also pertinent to this visit.    Health maintenance needs, recent test results and goals of care discussed with pt and questions answered.

## 2022-05-02 NOTE — TELEPHONE ENCOUNTER
Patient call returned. She stated that she has RT side pain & her urine has a file odor. Patient was scheduled to be seen on today for 2:30 pm & verbally understood the appointment information given.

## 2022-05-03 ENCOUNTER — PATIENT MESSAGE (OUTPATIENT)
Dept: PRIMARY CARE CLINIC | Facility: CLINIC | Age: 67
End: 2022-05-03
Payer: MEDICARE

## 2022-05-03 DIAGNOSIS — N30.90 CYSTITIS: Primary | ICD-10-CM

## 2022-05-03 RX ORDER — CIPROFLOXACIN 500 MG/1
500 TABLET ORAL DAILY
Qty: 7 TABLET | Refills: 0 | Status: SHIPPED | OUTPATIENT
Start: 2022-05-03 | End: 2022-05-10

## 2022-05-04 DIAGNOSIS — G47.00 INSOMNIA, UNSPECIFIED TYPE: ICD-10-CM

## 2022-05-04 RX ORDER — TEMAZEPAM 30 MG/1
CAPSULE ORAL
Qty: 90 CAPSULE | Refills: 1 | Status: SHIPPED | OUTPATIENT
Start: 2022-05-04 | End: 2022-11-03

## 2022-05-09 ENCOUNTER — OFFICE VISIT (OUTPATIENT)
Dept: PRIMARY CARE CLINIC | Facility: CLINIC | Age: 67
End: 2022-05-09
Payer: MEDICARE

## 2022-05-09 ENCOUNTER — HOSPITAL ENCOUNTER (OUTPATIENT)
Dept: RADIOLOGY | Facility: HOSPITAL | Age: 67
Discharge: HOME | End: 2022-05-09
Attending: SURGERY
Payer: MEDICARE

## 2022-05-09 VITALS
HEART RATE: 91 BPM | OXYGEN SATURATION: 97 % | WEIGHT: 161.38 LBS | DIASTOLIC BLOOD PRESSURE: 80 MMHG | SYSTOLIC BLOOD PRESSURE: 124 MMHG | HEIGHT: 62 IN | TEMPERATURE: 98 F | BODY MASS INDEX: 29.7 KG/M2

## 2022-05-09 DIAGNOSIS — R10.11 RIGHT UPPER QUADRANT ABDOMINAL PAIN: ICD-10-CM

## 2022-05-09 DIAGNOSIS — N30.90 CYSTITIS: Primary | ICD-10-CM

## 2022-05-09 DIAGNOSIS — N61.1 ABSCESS OF BREAST: ICD-10-CM

## 2022-05-09 PROCEDURE — 1101F PT FALLS ASSESS-DOCD LE1/YR: CPT | Mod: CPTII,S$GLB,, | Performed by: NURSE PRACTITIONER

## 2022-05-09 PROCEDURE — 1157F ADVNC CARE PLAN IN RCRD: CPT | Mod: CPTII,S$GLB,, | Performed by: NURSE PRACTITIONER

## 2022-05-09 PROCEDURE — 99214 PR OFFICE/OUTPT VISIT, EST, LEVL IV, 30-39 MIN: ICD-10-PCS | Mod: S$GLB,,, | Performed by: NURSE PRACTITIONER

## 2022-05-09 PROCEDURE — 3288F FALL RISK ASSESSMENT DOCD: CPT | Mod: CPTII,S$GLB,, | Performed by: NURSE PRACTITIONER

## 2022-05-09 PROCEDURE — 1157F PR ADVANCE CARE PLAN OR EQUIV PRESENT IN MEDICAL RECORD: ICD-10-PCS | Mod: CPTII,S$GLB,, | Performed by: NURSE PRACTITIONER

## 2022-05-09 PROCEDURE — 3008F PR BODY MASS INDEX (BMI) DOCUMENTED: ICD-10-PCS | Mod: CPTII,S$GLB,, | Performed by: NURSE PRACTITIONER

## 2022-05-09 PROCEDURE — 99999 PR PBB SHADOW E&M-EST. PATIENT-LVL V: CPT | Mod: PBBFAC,,, | Performed by: NURSE PRACTITIONER

## 2022-05-09 PROCEDURE — 99214 OFFICE O/P EST MOD 30 MIN: CPT | Mod: S$GLB,,, | Performed by: NURSE PRACTITIONER

## 2022-05-09 PROCEDURE — 1159F PR MEDICATION LIST DOCUMENTED IN MEDICAL RECORD: ICD-10-PCS | Mod: CPTII,S$GLB,, | Performed by: NURSE PRACTITIONER

## 2022-05-09 PROCEDURE — 4010F PR ACE/ARB THEARPY RXD/TAKEN: ICD-10-PCS | Mod: CPTII,S$GLB,, | Performed by: NURSE PRACTITIONER

## 2022-05-09 PROCEDURE — 3079F PR MOST RECENT DIASTOLIC BLOOD PRESSURE 80-89 MM HG: ICD-10-PCS | Mod: CPTII,S$GLB,, | Performed by: NURSE PRACTITIONER

## 2022-05-09 PROCEDURE — 1159F MED LIST DOCD IN RCRD: CPT | Mod: CPTII,S$GLB,, | Performed by: NURSE PRACTITIONER

## 2022-05-09 PROCEDURE — 3074F PR MOST RECENT SYSTOLIC BLOOD PRESSURE < 130 MM HG: ICD-10-PCS | Mod: CPTII,S$GLB,, | Performed by: NURSE PRACTITIONER

## 2022-05-09 PROCEDURE — 3079F DIAST BP 80-89 MM HG: CPT | Mod: CPTII,S$GLB,, | Performed by: NURSE PRACTITIONER

## 2022-05-09 PROCEDURE — 3066F PR DOCUMENTATION OF TREATMENT FOR NEPHROPATHY: ICD-10-PCS | Mod: CPTII,S$GLB,, | Performed by: NURSE PRACTITIONER

## 2022-05-09 PROCEDURE — 4010F ACE/ARB THERAPY RXD/TAKEN: CPT | Mod: CPTII,S$GLB,, | Performed by: NURSE PRACTITIONER

## 2022-05-09 PROCEDURE — 3288F PR FALLS RISK ASSESSMENT DOCUMENTED: ICD-10-PCS | Mod: CPTII,S$GLB,, | Performed by: NURSE PRACTITIONER

## 2022-05-09 PROCEDURE — 3074F SYST BP LT 130 MM HG: CPT | Mod: CPTII,S$GLB,, | Performed by: NURSE PRACTITIONER

## 2022-05-09 PROCEDURE — 3066F NEPHROPATHY DOC TX: CPT | Mod: CPTII,S$GLB,, | Performed by: NURSE PRACTITIONER

## 2022-05-09 PROCEDURE — 1101F PR PT FALLS ASSESS DOC 0-1 FALLS W/OUT INJ PAST YR: ICD-10-PCS | Mod: CPTII,S$GLB,, | Performed by: NURSE PRACTITIONER

## 2022-05-09 PROCEDURE — 99999 PR PBB SHADOW E&M-EST. PATIENT-LVL V: ICD-10-PCS | Mod: PBBFAC,,, | Performed by: NURSE PRACTITIONER

## 2022-05-09 PROCEDURE — 3008F BODY MASS INDEX DOCD: CPT | Mod: CPTII,S$GLB,, | Performed by: NURSE PRACTITIONER

## 2022-05-09 NOTE — PROGRESS NOTES
Nadia Damon  05/10/2022  [id number]    Luz Dickson NP  Patient Care Team:  Luz Dickson NP as PCP - General (Family Medicine)  Miguel Soni Jr., MD as Consulting Physician (Vascular Surgery)  Ike King MD as Consulting Physician (Cardiology)  Courtney Tubbs MD as Consulting Physician (Cardiology)  Carter Crawford MD as Consulting Physician (Nephrology)  Paxton Vasques OD as Consulting Physician (Optometry)  PRANAY Villalobos MD as Obstetrician (Obstetrics)  Parker Mccarthy IV, MD (Urology)  Ike King MD as Consulting Physician (Cardiology)  Jose oRland MD as Consulting Physician (Dermatology)  Prasanth Johnson MD as Consulting Physician (Rheumatology)  Ayanna Hart RN as Oncology Navigator  Nora Morin LMSW as         [facility] Primary Care Note      Chief Complaint:  Chief Complaint   Patient presents with    Follow-up       History of Present Illness:  HPI    F/U cystitis. Feeling better overall. Almost complete abx rx. Still a little soreness to right flank. No other sx. No other sx. Having regular soft, formed BMs. Increased flatulence. Appetite is fair.         Review of Systems   Constitutional: Negative for chills, fever, malaise/fatigue and weight loss.   Respiratory: Negative for cough and shortness of breath.    Cardiovascular: Negative for chest pain and leg swelling.   Gastrointestinal: Positive for heartburn. Negative for abdominal pain, constipation, diarrhea, nausea and vomiting.   Genitourinary: Positive for flank pain. Negative for dysuria, frequency and hematuria.   Musculoskeletal: Negative for myalgias.   Neurological: Negative for dizziness and headaches.   Psychiatric/Behavioral: Negative for depression. The patient does not have insomnia.          The following were reviewed: Active problem list, medication list, allergies, family history, social history, and Health Maintenance.     History:  Past Medical History:   Diagnosis Date     Abdominal wall hernia     CT Renal 6/11/2018---Small fat containing superior ventral abdominal wall hernia at the epicardial pacing lead site.    Anxiety     Arthritis     ZEN HIPS    Breast cancer in female 08/2021    LEFT BREAST    Cellulitis of axilla, left 12/23/2021    Chronic diastolic heart failure 12/16/2021    Chronic kidney disease     stage 4, GFR 15-29 ml/min    Chronic midline low back pain without sciatica 6/18/2018    Coronary artery disease 1993    heart transplant    Depression     Fibromyalgia     on Lyrica    Heart failure     native heart cardiomyopathy    Heart transplanted 1993    due to cardiomyopathy    History of hyperparathyroidism; Hyperparathyroidism, secondary renal     PT DENIES    Hypertension     Immune disorder     anti rejection meds    Iron deficiency anemia 8/15/2017    Kidney stones     passed per pt    Obesity     Other osteoporosis without current pathological fracture 8/30/2019    Shingles 2003 approx    left leg    Trouble in sleeping     Urinary incontinence      Past Surgical History:   Procedure Laterality Date    BLADDER SURGERY  2015 approx    mesh - Dr Everett then 2nd reconstructive sx Dr Onofre    BREAST BIOPSY Bilateral     NEGATIVE    BREAST SURGERY Left 9/28/15    Bx - benign    BREAST SURGERY Right 12/2015    Bx benign    CARDIAC PACEMAKER REMOVAL Left 06/26/2014    Pacer defirillator removed. Put in 1993 aat time of heart transplant    CARPAL TUNNEL RELEASE Left 03/03/15    Dr. Hall    COLONOSCOPY N/A 2/25/2021    Procedure: COLONOSCOPY;  Surgeon: Freida Ramirez MD;  Location: [facility] ENDO;  Service: Endoscopy;  Laterality: N/A;    HEART TRANSPLANT  1993    HERNIA REPAIR Right 1971 approx    Inguinal    HYSTERECTOMY  1983    vag hyst /LSO     INCISION AND DRAINAGE OF ABSCESS Left 12/24/2021    Procedure: INCISION AND DRAINAGE, ABSCESS;  Surgeon: Joseph Longo MD;  Location: [facility] OR;  Service: General;  Laterality: Left;     INSERTION OF TUNNELED CENTRAL VENOUS CATHETER (CVC) WITH SUBCUTANEOUS PORT N/A 2021    Procedure: [id number]-[id number]-L-CATH;  Surgeon: Christoph Douglas MD;  Location: [facility] OR;  Service: General;  Laterality: N/A;    REMOVAL OF VASCULAR ACCESS PORT      SENTINEL LYMPH NODE BIOPSY Left 10/12/2021    Procedure: BIOPSY, LYMPH NODE, SENTINEL;  Surgeon: Christoph Douglas MD;  Location: [facility] OR;  Service: General;  Laterality: Left;    TOE SURGERY       Family History   Problem Relation Age of Onset    Cancer Mother 38        breast    Breast cancer Mother     Heart disease Maternal Grandmother     Cataracts Cousin     Hypertension Son     Diabetes Neg Hx     Stroke Neg Hx     Kidney disease Neg Hx     Asthma Neg Hx     COPD Neg Hx     Melanoma Neg Hx     Hyperlipidemia Neg Hx      Social History     Socioeconomic History    Marital status: Single    Number of children: 2    Highest education level: 11th grade   Occupational History    Occupation: Retired   Tobacco Use    Smoking status: Never Smoker    Smokeless tobacco: Never Used   Substance and Sexual Activity    Alcohol use: Never     Alcohol/week: 0.0 standard drinks    Drug use: No    Sexual activity: Not Currently     Partners: Male     Birth control/protection: See Surgical Hx   Other Topics Concern    Are you pregnant or think you may be? No    Breast-feeding No   Social History Narrative    Single. 2 children , 1  at 30 yoa   strep throat -  pneumonia and renal complications after not completing course of AB. Other child lives in [place], [place]. Has a cousin locally that could help in an emergency. 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 Caffeine intake =- 1 cola a day. No coffee, + occasional tea, avoids caffeine especially at night. Still drives. She does not have a Living Will or Advanced directive.      Patient Active Problem List   Diagnosis    Heart transplanted    Anxiety    Insomnia    CKD  (chronic kidney disease) stage 4, GFR 15-29 ml/min    Immunosuppression due to drug therapy    Fibromyalgia    Gastroesophageal reflux disease without esophagitis    Breast screening    Immunization deficiency    Essential hypertension    Aortic atherosclerosis    Chronic major depressive disorder, recurrent episode    Iron deficiency anemia    Vaginal atrophy    Hyperparathyroidism    Hx of falling    Chronic midline low back pain without sciatica    Closed nondisplaced fracture of distal phalanx of left great toe with routine healing    Lightheadedness    Medication side effects    Obesity (BMI 30.0-34.9)    Edema    Asymptomatic menopausal state     Other osteoporosis without current pathological fracture    Cough    Abnormal CT scan, kidney    Weakness of both lower extremities    Immunocompromised patient    Osteoporosis, post-menopausal    Ductal carcinoma in situ (DCIS) of left breast    Immunodeficiency secondary to radiation therapy    Abnormal mammogram    The hangs, uncomplicated    Severe sepsis    GRACE (acute kidney injury)    Diarrhea    Thrombocytopenia, unspecified    Immunodeficiency due to chemotherapy    C. difficile colitis    Strain of left shoulder    Left shoulder pain    Ulnar neuropathy of left upper extremity    Anemia associated with stage 4 chronic renal failure    Secondary and unspecified malignant neoplasm of axilla and upper limb lymph nodes    Other chest pain    Cystitis     Review of patient's allergies indicates:   Allergen Reactions    Lisinopril Swelling and Rash    Augmentin [amoxicillin-pot clavulanate] Diarrhea       Medications:  Current Outpatient Medications on File Prior to Visit   Medication Sig Dispense Refill    acetaminophen (TYLENOL) 325 MG tablet Take 1 tablet (325 mg total) by mouth every 6 (six) hours as needed for Pain.  0    amLODIPine (NORVASC) 2.5 MG tablet       aspirin (ECOTRIN) 81 MG EC tablet Take 1 tablet (81  mg total) by mouth once daily. 90 tablet 3    atenoloL (TENORMIN) 50 MG tablet Take 1 tablet (50 mg total) by mouth once daily. 30 tablet 0    biotin 10,000 mcg Cap Take 1 tablet by mouth once daily.      busPIRone (BUSPAR) 10 MG tablet Take 1 tablet (10 mg total) by mouth once daily. 90 tablet 3    calcitonin, salmon, (FORTICAL) 200 unit/actuation nasal spray 1 spray by Nasal route once daily. 3.7 mL 3    carvediloL (COREG) 12.5 MG tablet 6.25 mg. 6.25 mg twice per day.      ciprofloxacin HCl (CIPRO) 500 MG tablet Take 1 tablet (500 mg total) by mouth once daily at 6am. for 7 days 7 tablet 0    cloNIDine (CATAPRES) 0.1 MG tablet Take 1 tablet (0.1 mg total) by mouth nightly as needed (BP >150/95). 90 tablet 3    cycloSPORINE modified, NEORAL, (NEORAL) 25 MG capsule TAKE 3 CAPSULES (75 MG TOTAL) BY MOUTH 2 (TWO) TIMES DAILY. 540 capsule 6    DULoxetine (CYMBALTA) 30 MG capsule TAKE 1 CAPSULE EVERY DAY 90 capsule 1    EVENING PRIMROSE OIL ORAL Take 1,000 mg by mouth once daily.      furosemide (LASIX) 20 MG tablet Take 1 tablet (20 mg total) by mouth 2 (two) times a day for 5 days, THEN 1 tablet (20 mg total) once daily. Take 1 tablet daily. 370 tablet 0    hydrALAZINE (APRESOLINE) 50 MG tablet Take 1 tablet (50 mg total) by mouth every 8 (eight) hours. TAKE 1 TABLETS  EVERY 8  HOURS. 270 tablet 3    losartan (COZAAR) 25 MG tablet Take 1 tablet (25 mg total) by mouth once daily. 90 tablet 3    multivitamin capsule Take 1 capsule by mouth once daily.      ondansetron (ZOFRAN-ODT) 8 MG TbDL Take 1 tablet (8 mg total) by mouth every 8 (eight) hours as needed (nausea). 60 tablet 5    pantoprazole (PROTONIX) 40 MG tablet Take 1 tablet (40 mg total) by mouth once daily. 90 tablet 1    pregabalin (LYRICA) 50 MG capsule Take 1 capsule (50 mg total) by mouth 2 (two) times daily. NOON & NIGHT TIME 180 capsule 1    temazepam (RESTORIL) 30 mg capsule Take 1 at bedtime 90 capsule 1    vitamin E 400 UNIT capsule  Take 400 Units by mouth once daily.      zolpidem (AMBIEN) 10 mg Tab TAKE 1 TABLET BY MOUTH ONCE DAILY IN THE EVENING 90 tablet 0     Current Facility-Administered Medications on File Prior to Visit   Medication Dose Route Frequency Provider Last Rate Last Admin    denosumab (PROLIA) injection 60 mg  60 mg Subcutaneous Q6 Months Prasanth Johnson MD        lactated ringers infusion   Intravenous Continuous Jennifer Carr MD 10 mL/hr at 11/09/21 0717 Restarted at 11/09/21 0820       Medications have been reviewed and reconciled with patient at visit today.    Barriers to medications present (no )    Adverse reactions to current medications (no)      Exam:  Vitals:    05/09/22 1432   BP: 124/80   Pulse: 91   Temp: 98 °F (36.7 °C)     Weight: 73.2 kg (161 lb 6 oz)   Body mass index is 29.52 kg/m².      BP Readings from Last 3 Encounters:   05/09/22 124/80   05/02/22 136/82   04/13/22 138/84     Wt Readings from Last 3 Encounters:   05/09/22 1432 73.2 kg (161 lb 6 oz)   05/02/22 1431 74 kg (163 lb 2.3 oz)   04/13/22 1511 72.9 kg (160 lb 11.5 oz)            Physical Exam  Constitutional:       General: She is not in acute distress.     Appearance: She is not ill-appearing.   Cardiovascular:      Rate and Rhythm: Normal rate and regular rhythm.      Heart sounds: Normal heart sounds.   Pulmonary:      Effort: Pulmonary effort is normal.      Breath sounds: Normal breath sounds.   Abdominal:      General: There is no distension.      Palpations: Abdomen is soft.      Tenderness: There is no right CVA tenderness, left CVA tenderness or guarding.   Musculoskeletal:        Arms:    Skin:     General: Skin is warm and dry.   Neurological:      Mental Status: She is alert. Mental status is at baseline.         Laboratory Reviewed: (Yes)  Lab Results   Component Value Date    WBC 3.79 (L) 05/02/2022    HGB 10.1 (L) 05/02/2022    HCT 32.9 (L) 05/02/2022     05/02/2022    CHOL 157 12/24/2021    TRIG 120 12/24/2021    HDL  42 12/24/2021    ALT 17 05/02/2022    AST 33 05/02/2022     05/02/2022    K 4.9 05/02/2022     05/02/2022    CREATININE 2.4 (H) 05/02/2022    BUN 34 (H) 05/02/2022    CO2 26 05/02/2022    TSH 5.158 (H) 12/24/2021    PSA <0.1 05/27/2008    INR 1.0 11/22/2021    HGBA1C 5.2 12/24/2021           Health Maintenance  Health Maintenance Topics with due status: Not Due       Topic Last Completion Date    Pneumococcal Vaccines (Age 65+) 10/22/2018    TETANUS VACCINE 09/09/2020    Colorectal Cancer Screening 02/25/2021    DEXA Scan 08/03/2021    Lipid Panel 12/24/2021    Mammogram 02/09/2022     Health Maintenance Due   Topic Date Due    COVID-19 Vaccine (3 - Moderna risk series) 11/25/2021       Assessment:  Problem List Items Addressed This Visit        Renal/    Cystitis - Primary     Sx improving.   Sensitive to Cipro, pt to complete abx.   Still with back pain, not at CVA.   CT abd/pelvis. Recheck UA and CMP this week.            Relevant Orders    Urinalysis, Reflex to Urine Culture Urine, Clean Catch    Comprehensive Metabolic Panel      Other Visit Diagnoses     Right upper quadrant abdominal pain        Relevant Orders    CT Renal Stone Study ABD Pelvis WO            Plan:  Cystitis  -     Urinalysis, Reflex to Urine Culture Urine, Clean Catch; Future; Expected date: 05/13/2022  -     Comprehensive Metabolic Panel; Future; Expected date: 05/13/2022    Right upper quadrant abdominal pain  -     CT Renal Stone Study ABD Pelvis WO; Future; Expected date: 05/09/2022      -Patient's lab results were reviewed and discussed with patient  -Treatment options and alternatives were discussed with the patient. Patient expressed understanding. Patient was given the opportunity to ask questions and be an active participant in their medical care. Patient had no further questions or concerns at this time.   -Documentation of patient's health and condition was obtained from family member who was present during  visit.  -Patient is an overall moderate risk for health complications from their medical conditions.       Follow up: Follow up in about 1 week (around 5/16/2022) for side pain follow up. Please schedule with Dr Wick. .      After visit summary printed and given to patient upon discharge.  Patient goals and care plan are included in After visit summary.    Total medical decision making time was 32 min.  The following issues were discussed: The primary encounter diagnosis was Cystitis. A diagnosis of Right upper quadrant abdominal pain was also pertinent to this visit.    Health maintenance needs, recent test results and goals of care discussed with pt and questions answered.

## 2022-05-10 PROBLEM — N30.90 CYSTITIS: Status: ACTIVE | Noted: 2022-05-10

## 2022-05-10 NOTE — ASSESSMENT & PLAN NOTE
Sx improving.   Sensitive to Cipro, pt to complete abx.   Still with back pain, not at CVA.   CT abd/pelvis. Recheck UA and CMP this week.

## 2022-05-11 ENCOUNTER — TELEPHONE (OUTPATIENT)
Dept: TRANSPLANT | Facility: CLINIC | Age: 67
End: 2022-05-11
Payer: MEDICARE

## 2022-05-12 NOTE — TELEPHONE ENCOUNTER
Spoke with pt regarding her annual visit 29 years post heart transplant. Pt states that she is feeling well and is ready to be scheduled. I explained that we can get labs and echo and then see Dr. Tubbs on 6/21/22 at [facility].

## 2022-05-13 ENCOUNTER — INFUSION (OUTPATIENT)
Dept: INFUSION THERAPY | Facility: HOSPITAL | Age: 67
End: 2022-05-13
Attending: NURSE PRACTITIONER
Payer: MEDICARE

## 2022-05-13 VITALS
RESPIRATION RATE: 18 BRPM | SYSTOLIC BLOOD PRESSURE: 135 MMHG | OXYGEN SATURATION: 95 % | DIASTOLIC BLOOD PRESSURE: 83 MMHG | HEART RATE: 100 BPM | TEMPERATURE: 98 F

## 2022-05-13 DIAGNOSIS — M81.8 OTHER OSTEOPOROSIS WITHOUT CURRENT PATHOLOGICAL FRACTURE: ICD-10-CM

## 2022-05-13 DIAGNOSIS — D63.1 ANEMIA ASSOCIATED WITH STAGE 4 CHRONIC RENAL FAILURE: Primary | ICD-10-CM

## 2022-05-13 DIAGNOSIS — N18.4 ANEMIA ASSOCIATED WITH STAGE 4 CHRONIC RENAL FAILURE: Primary | ICD-10-CM

## 2022-05-13 PROCEDURE — 96372 THER/PROPH/DIAG INJ SC/IM: CPT

## 2022-05-13 PROCEDURE — 63600175 PHARM REV CODE 636 W HCPCS: Mod: JG,EC

## 2022-05-13 RX ADMIN — EPOETIN ALFA-EPBX 20000 UNITS: 20000 INJECTION, SOLUTION INTRAVENOUS; SUBCUTANEOUS at 02:05

## 2022-05-13 NOTE — DISCHARGE INSTRUCTIONS
.[facility]  [address] [address]  [address] [address]  [phone] phone     [phone] fax  Hours of Operation: Monday- Friday 8:00am- 5:00pm  After hours phone  [phone]  Hematology / Oncology Physicians on call    Dr. Dennis Ch      Nurse Practitioners:    Kristine Delgado, ONELIA Duran, ONELIA Mendoza, ONELIA Marina, BARON Downing      Please don't hesitate to call if you have any concerns.

## 2022-05-16 ENCOUNTER — HOSPITAL ENCOUNTER (OUTPATIENT)
Dept: RADIOLOGY | Facility: HOSPITAL | Age: 67
Discharge: HOME | End: 2022-05-16
Attending: SURGERY
Payer: MEDICARE

## 2022-05-16 DIAGNOSIS — N61.1 ABSCESS OF BREAST: ICD-10-CM

## 2022-05-16 PROBLEM — R94.6 ABNORMAL THYROID FUNCTION TEST: Status: ACTIVE | Noted: 2022-05-16

## 2022-05-16 PROBLEM — E83.42 HYPOMAGNESEMIA: Status: ACTIVE | Noted: 2022-05-16

## 2022-05-16 PROCEDURE — 76642 ULTRASOUND BREAST LIMITED: CPT | Mod: TC,LT

## 2022-05-16 PROCEDURE — 76642 US BREAST LEFT LIMITED: ICD-10-PCS | Mod: 26,LT,, | Performed by: RADIOLOGY

## 2022-05-16 PROCEDURE — 77065 DX MAMMO INCL CAD UNI: CPT | Mod: TC,LT

## 2022-05-16 PROCEDURE — 77061 MAMMO DIGITAL DIAGNOSTIC LEFT WITH TOMO: ICD-10-PCS | Mod: 26,LT,, | Performed by: RADIOLOGY

## 2022-05-16 PROCEDURE — 77061 BREAST TOMOSYNTHESIS UNI: CPT | Mod: 26,LT,, | Performed by: RADIOLOGY

## 2022-05-16 PROCEDURE — 77065 DX MAMMO INCL CAD UNI: CPT | Mod: 26,LT,, | Performed by: RADIOLOGY

## 2022-05-16 PROCEDURE — 77065 MAMMO DIGITAL DIAGNOSTIC LEFT WITH TOMO: ICD-10-PCS | Mod: 26,LT,, | Performed by: RADIOLOGY

## 2022-05-16 PROCEDURE — 76642 ULTRASOUND BREAST LIMITED: CPT | Mod: 26,LT,, | Performed by: RADIOLOGY

## 2022-05-20 ENCOUNTER — HOSPITAL ENCOUNTER (OUTPATIENT)
Dept: RADIOLOGY | Facility: HOSPITAL | Age: 67
Discharge: HOME | End: 2022-05-20
Attending: NURSE PRACTITIONER
Payer: MEDICARE

## 2022-05-20 ENCOUNTER — TELEPHONE (OUTPATIENT)
Dept: PRIMARY CARE CLINIC | Facility: CLINIC | Age: 67
End: 2022-05-20
Payer: MEDICARE

## 2022-05-20 DIAGNOSIS — R10.11 RIGHT UPPER QUADRANT ABDOMINAL PAIN: ICD-10-CM

## 2022-05-20 PROCEDURE — 74176 CT ABD & PELVIS W/O CONTRAST: CPT | Mod: TC

## 2022-05-20 NOTE — TELEPHONE ENCOUNTER
Called pt, left VM. Lots of stool in colon - should use OTC laxative, dulcolax, to treat. CT also shows arthritis at spine. This could be cause of pain. Would repeat UA. Keep f/u appt next week, sooner if sx do not improve or worsen.

## 2022-05-20 NOTE — TELEPHONE ENCOUNTER
Will inform patient when she calls back per NP Luz Dickson:   I attempted to call pt but had to leave vm. If she calls back please relay message. If she needs to be seen sooner then Wednesday put her on my schedule. Thank you    Routing comment

## 2022-05-23 ENCOUNTER — PATIENT MESSAGE (OUTPATIENT)
Dept: SURGERY | Facility: CLINIC | Age: 67
End: 2022-05-23
Payer: MEDICARE

## 2022-05-23 DIAGNOSIS — D05.12 DUCTAL CARCINOMA IN SITU (DCIS) OF LEFT BREAST: Primary | ICD-10-CM

## 2022-05-24 ENCOUNTER — OFFICE VISIT (OUTPATIENT)
Dept: RADIATION ONCOLOGY | Facility: CLINIC | Age: 67
End: 2022-05-24
Payer: MEDICARE

## 2022-05-24 VITALS
WEIGHT: 163.81 LBS | HEIGHT: 62 IN | HEART RATE: 94 BPM | TEMPERATURE: 97 F | DIASTOLIC BLOOD PRESSURE: 75 MMHG | BODY MASS INDEX: 30.14 KG/M2 | RESPIRATION RATE: 18 BRPM | OXYGEN SATURATION: 98 % | SYSTOLIC BLOOD PRESSURE: 121 MMHG

## 2022-05-24 DIAGNOSIS — D05.12 DUCTAL CARCINOMA IN SITU (DCIS) OF LEFT BREAST: Primary | ICD-10-CM

## 2022-05-24 PROCEDURE — 1157F PR ADVANCE CARE PLAN OR EQUIV PRESENT IN MEDICAL RECORD: ICD-10-PCS | Mod: CPTII,S$GLB,, | Performed by: RADIOLOGY

## 2022-05-24 PROCEDURE — 4010F PR ACE/ARB THEARPY RXD/TAKEN: ICD-10-PCS | Mod: CPTII,S$GLB,, | Performed by: RADIOLOGY

## 2022-05-24 PROCEDURE — 3078F PR MOST RECENT DIASTOLIC BLOOD PRESSURE < 80 MM HG: ICD-10-PCS | Mod: CPTII,S$GLB,, | Performed by: RADIOLOGY

## 2022-05-24 PROCEDURE — 1157F ADVNC CARE PLAN IN RCRD: CPT | Mod: CPTII,S$GLB,, | Performed by: RADIOLOGY

## 2022-05-24 PROCEDURE — 3074F PR MOST RECENT SYSTOLIC BLOOD PRESSURE < 130 MM HG: ICD-10-PCS | Mod: CPTII,S$GLB,, | Performed by: RADIOLOGY

## 2022-05-24 PROCEDURE — 1126F AMNT PAIN NOTED NONE PRSNT: CPT | Mod: CPTII,S$GLB,, | Performed by: RADIOLOGY

## 2022-05-24 PROCEDURE — 99213 PR OFFICE/OUTPT VISIT, EST, LEVL III, 20-29 MIN: ICD-10-PCS | Mod: S$GLB,,, | Performed by: RADIOLOGY

## 2022-05-24 PROCEDURE — 1159F MED LIST DOCD IN RCRD: CPT | Mod: CPTII,S$GLB,, | Performed by: RADIOLOGY

## 2022-05-24 PROCEDURE — 3066F PR DOCUMENTATION OF TREATMENT FOR NEPHROPATHY: ICD-10-PCS | Mod: CPTII,S$GLB,, | Performed by: RADIOLOGY

## 2022-05-24 PROCEDURE — 3066F NEPHROPATHY DOC TX: CPT | Mod: CPTII,S$GLB,, | Performed by: RADIOLOGY

## 2022-05-24 PROCEDURE — 3074F SYST BP LT 130 MM HG: CPT | Mod: CPTII,S$GLB,, | Performed by: RADIOLOGY

## 2022-05-24 PROCEDURE — 4010F ACE/ARB THERAPY RXD/TAKEN: CPT | Mod: CPTII,S$GLB,, | Performed by: RADIOLOGY

## 2022-05-24 PROCEDURE — 1101F PT FALLS ASSESS-DOCD LE1/YR: CPT | Mod: CPTII,S$GLB,, | Performed by: RADIOLOGY

## 2022-05-24 PROCEDURE — 3288F PR FALLS RISK ASSESSMENT DOCUMENTED: ICD-10-PCS | Mod: CPTII,S$GLB,, | Performed by: RADIOLOGY

## 2022-05-24 PROCEDURE — 99999 PR PBB SHADOW E&M-EST. PATIENT-LVL IV: ICD-10-PCS | Mod: PBBFAC,,, | Performed by: RADIOLOGY

## 2022-05-24 PROCEDURE — 3008F PR BODY MASS INDEX (BMI) DOCUMENTED: ICD-10-PCS | Mod: CPTII,S$GLB,, | Performed by: RADIOLOGY

## 2022-05-24 PROCEDURE — 1159F PR MEDICATION LIST DOCUMENTED IN MEDICAL RECORD: ICD-10-PCS | Mod: CPTII,S$GLB,, | Performed by: RADIOLOGY

## 2022-05-24 PROCEDURE — 99213 OFFICE O/P EST LOW 20 MIN: CPT | Mod: S$GLB,,, | Performed by: RADIOLOGY

## 2022-05-24 PROCEDURE — 1126F PR PAIN SEVERITY QUANTIFIED, NO PAIN PRESENT: ICD-10-PCS | Mod: CPTII,S$GLB,, | Performed by: RADIOLOGY

## 2022-05-24 PROCEDURE — 3078F DIAST BP <80 MM HG: CPT | Mod: CPTII,S$GLB,, | Performed by: RADIOLOGY

## 2022-05-24 PROCEDURE — 1101F PR PT FALLS ASSESS DOC 0-1 FALLS W/OUT INJ PAST YR: ICD-10-PCS | Mod: CPTII,S$GLB,, | Performed by: RADIOLOGY

## 2022-05-24 PROCEDURE — 3288F FALL RISK ASSESSMENT DOCD: CPT | Mod: CPTII,S$GLB,, | Performed by: RADIOLOGY

## 2022-05-24 PROCEDURE — 3008F BODY MASS INDEX DOCD: CPT | Mod: CPTII,S$GLB,, | Performed by: RADIOLOGY

## 2022-05-24 PROCEDURE — 99999 PR PBB SHADOW E&M-EST. PATIENT-LVL IV: CPT | Mod: PBBFAC,,, | Performed by: RADIOLOGY

## 2022-05-24 NOTE — PROGRESS NOTES
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 L lumpectomy 9/10/21  2. L sentinel node biopsy 10/12/21  3. Attempted chemotherapy  4. 42.56Gy/16fx to L breast and axilla completed 22    INTERVAL HISTORY: Nadia Damon is a 66 y.o. female who presents today for follow-up.  This is her first follow up since completing treatment. During treatment, she    completed 42.56Gy/16fx to L breast and L axilla. During tx, she had unexpected chest pains requiring cardiology workup and will f/u with cardiology and heart transplant in near future; missed one treatment.   Had grade 1 skin toxicity used Miaderm, no peeling. Had burning sensation requiring gel cooling pads.     Since then, had MMG/US showed some fluid collection L breast rec 6mo follow up.  Today, notes some tenderness to lump cavity - using Aquaphor.  Skin patchy hyperpigmetnation but healed.    PHYSICAL EXAM:   Constitutional: well appearing, no acute distress, ECOG 1 - Ambulates, capable of light work  Vitals:    /75   Pulse 94   Temp 97.4 °F (36.3 °C)   Resp 18   Ht 5' 2" (1.575 m)   Wt 74.3 kg (163 lb 12.8 oz)   LMP 1983 (Within Years)   SpO2 98%   BMI 29.96 kg/m²   Eyes: sclera anicteric, EOMI, pupils equal, round and reactive to light  ENT: oral cavity without lesions, moist mucous membranes  Neck: trachea midline, neck supple  Lymphatic: no cervical, supraclavicular or axillary adenopathy  Breast: no masses, skin changes or retractions, non-tender, hyperpigmented patchy skin on L breast, no scar tissue or mass beneath scar  Cardiovascular: regular rate, no edema of the upper or lower extremities, radial pulse 2+  Respiratory: unlabored effort, clear to auscultation, no wheezes  Abdomen: soft, non-tender, no rigidity, no masses, no " hepatomegaly    Laboratory & X-Ray Findings: None new.      ASSESSMENT: recovering well from acute toxicities of radiation    PLAN: Ms. Damon has recovered well from radiation. Skin coloration will improved with time.  She can use any type of non-scented moisturizer on skin going forward.  She will continue on endocrine therapy and mammogram surveillance (already scheduled in Nov 2022) with medical oncology/breast survivorship.    Follow up with me in 6 months.    I spent approximately 20 minutes reviewing the available records and evaluating the patient, out of which over 50% of the time was spent face to face with the patient in counseling and coordinating this patient's care.    Samir Burger III, M.D.  Radiation Oncology  [facility] [address] [place] [address]  Ph: [phone]  [email or web address]

## 2022-05-25 ENCOUNTER — PATIENT MESSAGE (OUTPATIENT)
Dept: PRIMARY CARE CLINIC | Facility: CLINIC | Age: 67
End: 2022-05-25

## 2022-05-25 ENCOUNTER — TELEPHONE (OUTPATIENT)
Dept: TRANSPLANT | Facility: CLINIC | Age: 67
End: 2022-05-25
Payer: MEDICARE

## 2022-05-25 ENCOUNTER — OFFICE VISIT (OUTPATIENT)
Dept: PRIMARY CARE CLINIC | Facility: CLINIC | Age: 67
End: 2022-05-25
Payer: MEDICARE

## 2022-05-25 VITALS
OXYGEN SATURATION: 99 % | TEMPERATURE: 98 F | HEART RATE: 87 BPM | WEIGHT: 163.56 LBS | BODY MASS INDEX: 30.1 KG/M2 | HEIGHT: 62 IN | SYSTOLIC BLOOD PRESSURE: 122 MMHG | DIASTOLIC BLOOD PRESSURE: 78 MMHG

## 2022-05-25 DIAGNOSIS — I10 ESSENTIAL HYPERTENSION: ICD-10-CM

## 2022-05-25 DIAGNOSIS — Z94.1 HEART TRANSPLANTED: Chronic | ICD-10-CM

## 2022-05-25 DIAGNOSIS — K59.01 SLOW TRANSIT CONSTIPATION: ICD-10-CM

## 2022-05-25 DIAGNOSIS — E21.3 HYPERPARATHYROIDISM: ICD-10-CM

## 2022-05-25 DIAGNOSIS — Z94.1 STATUS POST HEART TRANSPLANT: Primary | ICD-10-CM

## 2022-05-25 DIAGNOSIS — E83.42 HYPOMAGNESEMIA: ICD-10-CM

## 2022-05-25 DIAGNOSIS — D84.821 IMMUNOSUPPRESSION DUE TO DRUG THERAPY: ICD-10-CM

## 2022-05-25 DIAGNOSIS — F33.9 CHRONIC MAJOR DEPRESSIVE DISORDER, RECURRENT EPISODE: ICD-10-CM

## 2022-05-25 DIAGNOSIS — N18.4 ANEMIA ASSOCIATED WITH STAGE 4 CHRONIC RENAL FAILURE: ICD-10-CM

## 2022-05-25 DIAGNOSIS — Z79.899 IMMUNOSUPPRESSION DUE TO DRUG THERAPY: ICD-10-CM

## 2022-05-25 DIAGNOSIS — D63.1 ANEMIA ASSOCIATED WITH STAGE 4 CHRONIC RENAL FAILURE: ICD-10-CM

## 2022-05-25 DIAGNOSIS — R94.6 ABNORMAL THYROID FUNCTION TEST: ICD-10-CM

## 2022-05-25 DIAGNOSIS — D05.12 DUCTAL CARCINOMA IN SITU (DCIS) OF LEFT BREAST: Primary | ICD-10-CM

## 2022-05-25 PROCEDURE — 1101F PR PT FALLS ASSESS DOC 0-1 FALLS W/OUT INJ PAST YR: ICD-10-PCS | Mod: CPTII,S$GLB,, | Performed by: INTERNAL MEDICINE

## 2022-05-25 PROCEDURE — 99215 OFFICE O/P EST HI 40 MIN: CPT | Mod: S$GLB,,, | Performed by: INTERNAL MEDICINE

## 2022-05-25 PROCEDURE — 1125F PR PAIN SEVERITY QUANTIFIED, PAIN PRESENT: ICD-10-PCS | Mod: CPTII,S$GLB,, | Performed by: INTERNAL MEDICINE

## 2022-05-25 PROCEDURE — 1157F PR ADVANCE CARE PLAN OR EQUIV PRESENT IN MEDICAL RECORD: ICD-10-PCS | Mod: CPTII,S$GLB,, | Performed by: INTERNAL MEDICINE

## 2022-05-25 PROCEDURE — 3074F PR MOST RECENT SYSTOLIC BLOOD PRESSURE < 130 MM HG: ICD-10-PCS | Mod: CPTII,S$GLB,, | Performed by: INTERNAL MEDICINE

## 2022-05-25 PROCEDURE — 3078F DIAST BP <80 MM HG: CPT | Mod: CPTII,S$GLB,, | Performed by: INTERNAL MEDICINE

## 2022-05-25 PROCEDURE — 99999 PR PBB SHADOW E&M-EST. PATIENT-LVL V: CPT | Mod: PBBFAC,,, | Performed by: INTERNAL MEDICINE

## 2022-05-25 PROCEDURE — 1157F ADVNC CARE PLAN IN RCRD: CPT | Mod: CPTII,S$GLB,, | Performed by: INTERNAL MEDICINE

## 2022-05-25 PROCEDURE — 1125F AMNT PAIN NOTED PAIN PRSNT: CPT | Mod: CPTII,S$GLB,, | Performed by: INTERNAL MEDICINE

## 2022-05-25 PROCEDURE — 3288F PR FALLS RISK ASSESSMENT DOCUMENTED: ICD-10-PCS | Mod: CPTII,S$GLB,, | Performed by: INTERNAL MEDICINE

## 2022-05-25 PROCEDURE — 99215 PR OFFICE/OUTPT VISIT, EST, LEVL V, 40-54 MIN: ICD-10-PCS | Mod: S$GLB,,, | Performed by: INTERNAL MEDICINE

## 2022-05-25 PROCEDURE — 4010F PR ACE/ARB THEARPY RXD/TAKEN: ICD-10-PCS | Mod: CPTII,S$GLB,, | Performed by: INTERNAL MEDICINE

## 2022-05-25 PROCEDURE — 3074F SYST BP LT 130 MM HG: CPT | Mod: CPTII,S$GLB,, | Performed by: INTERNAL MEDICINE

## 2022-05-25 PROCEDURE — 1101F PT FALLS ASSESS-DOCD LE1/YR: CPT | Mod: CPTII,S$GLB,, | Performed by: INTERNAL MEDICINE

## 2022-05-25 PROCEDURE — 3078F PR MOST RECENT DIASTOLIC BLOOD PRESSURE < 80 MM HG: ICD-10-PCS | Mod: CPTII,S$GLB,, | Performed by: INTERNAL MEDICINE

## 2022-05-25 PROCEDURE — 1159F MED LIST DOCD IN RCRD: CPT | Mod: CPTII,S$GLB,, | Performed by: INTERNAL MEDICINE

## 2022-05-25 PROCEDURE — 99999 PR PBB SHADOW E&M-EST. PATIENT-LVL V: ICD-10-PCS | Mod: PBBFAC,,, | Performed by: INTERNAL MEDICINE

## 2022-05-25 PROCEDURE — 3066F NEPHROPATHY DOC TX: CPT | Mod: CPTII,S$GLB,, | Performed by: INTERNAL MEDICINE

## 2022-05-25 PROCEDURE — 3066F PR DOCUMENTATION OF TREATMENT FOR NEPHROPATHY: ICD-10-PCS | Mod: CPTII,S$GLB,, | Performed by: INTERNAL MEDICINE

## 2022-05-25 PROCEDURE — 99499 RISK ADDL DX/OHS AUDIT: ICD-10-PCS | Mod: S$GLB,,, | Performed by: INTERNAL MEDICINE

## 2022-05-25 PROCEDURE — 3288F FALL RISK ASSESSMENT DOCD: CPT | Mod: CPTII,S$GLB,, | Performed by: INTERNAL MEDICINE

## 2022-05-25 PROCEDURE — 3008F PR BODY MASS INDEX (BMI) DOCUMENTED: ICD-10-PCS | Mod: CPTII,S$GLB,, | Performed by: INTERNAL MEDICINE

## 2022-05-25 PROCEDURE — 99499 UNLISTED E&M SERVICE: CPT | Mod: S$GLB,,, | Performed by: INTERNAL MEDICINE

## 2022-05-25 PROCEDURE — 4010F ACE/ARB THERAPY RXD/TAKEN: CPT | Mod: CPTII,S$GLB,, | Performed by: INTERNAL MEDICINE

## 2022-05-25 PROCEDURE — 1159F PR MEDICATION LIST DOCUMENTED IN MEDICAL RECORD: ICD-10-PCS | Mod: CPTII,S$GLB,, | Performed by: INTERNAL MEDICINE

## 2022-05-25 PROCEDURE — 3008F BODY MASS INDEX DOCD: CPT | Mod: CPTII,S$GLB,, | Performed by: INTERNAL MEDICINE

## 2022-05-25 RX ORDER — CARVEDILOL 6.25 MG/1
6.25 TABLET ORAL 2 TIMES DAILY
Qty: 60 TABLET | Refills: 2 | Status: SHIPPED | OUTPATIENT
Start: 2022-05-25 | End: 2022-06-27

## 2022-05-25 NOTE — PROGRESS NOTES
Nadia Damon  05/25/2022  [id number]    Luz Dickson NP  Patient Care Team:  Luz Dickson NP as PCP - General (Family Medicine)  Miguel Soni Jr., MD as Consulting Physician (Vascular Surgery)  Ike King MD as Consulting Physician (Cardiology)  Courtney Tubbs MD as Consulting Physician (Cardiology)  Carter Crawford MD as Consulting Physician (Nephrology)  Paxton Vasques OD as Consulting Physician (Optometry)  PRANAY Villalobos MD as Obstetrician (Obstetrics)  Parker Mccarthy IV, MD (Urology)  Ike King MD as Consulting Physician (Cardiology)  Jose Roland MD as Consulting Physician (Dermatology)  Prasanth Johnson MD as Consulting Physician (Rheumatology)  Ayanna Hart RN as Oncology Navigator  Nora Morin HIMA as   Elis Wick MD as Physician (Internal Medicine)       Visit Type:a scheduled routine follow-up visit    Chief Complaint:  Chief Complaint   Patient presents with    1 week f/u      C/o R flank pain again. Heating pad helps. Maybe muskuloskeletal? Maybe constipation?     Only small hard BM this morning. Taking miralax if no BM x 2-3 days. Discussed importance of adequate fluids, fiber.   Reviewed that miralax can be taken daily for goal 1-2 large soft BM daily.    History of Present Illness:   Ms. Damon is a 66 year old female followed by Luz Dickson [facility], since October 2021. She has h/o heart transplant 1993, on anti-rejection medication. 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 She was treated with 1 cycle of systemic chemotherapy cytoxan and taxotere and udenyca that was discontinued due to toxicity. She has been followed by radiation oncology and completed last radiation treatment 5/14/22. She is still followed by Heme/Onc for Epo, initiated for anemia due to stage 4 CKD. Other medical issues include  depresssion/anxiety/insomnia, hypertension, chronic diastolic CHF, and osteoporois for which she is receiving Prolia. She was diagnosed w C diff November 2021 but was negative for C diff toxins. Most recently she's completed antibiotic treatment for suspected cystitis.     Ms. Damon is relieved to be finished w Radiation Tx. Reviewed timeline of diagnosis, surgery and treatment. She is surprised this all been in just the past year! Really was thinking has been 2-3 years since initial diagnosis and surgery.    Ms. Damon likes her senior living community but feels isolated with no family or close friends nearby. She is looking forward to her son visiting and meeting her new grand-daughter this summer. She expressed appreciation for Luz's support over this past year.    Hosp/ED/Urgent Care:  4/06/22 ED chest pain, SOB    Recent appointments:   5/20 CT Renal w/o contrast  5/16 Mammogram and L breast US  5/14 Radiation Tx  5/13 Heme/Onc Burk NP epo  5/09 PCP Mandy Dickson, NP f/u cystitis    Upcoming appointments:  6/14 Heme/Onc Burk NP epo?  6/21 Dr. Tubbs Transplant  7/13 PCP Mandy Dicksno    Labs 5/13/22: iron 59 sat 22% trans 181 (slightly low) glucose 67 eGFR 23 Dbili 0.3(wnl) UA wnl x trace protein  4/06  (12/02/21 383)  12/29/21: vit D 41  12/24/21: freeT4 0.9(wnl) mag 1.5 LDL 91  8/31/21: BRCA negative  3/09/21: PTHi 316    Echo 12/25/21: LVEF 60 %    The following were reviewed: Active problem list, medication list, allergies, family history, social history, and Health Maintenance.     History:  Past Medical History:   Diagnosis Date    Abdominal wall hernia     CT Renal 6/11/2018---Small fat containing superior ventral abdominal wall hernia at the epicardial pacing lead site.    Anxiety     Arthritis     ZEN HIPS    Breast cancer in female 08/2021    LEFT BREAST    Cellulitis of axilla, left 12/23/2021    Chronic diastolic heart failure 12/16/2021    Chronic  kidney disease     stage 4, GFR 15-29 ml/min    Chronic midline low back pain without sciatica 06/18/2018    Coronary artery disease 1993    heart transplant    Depression     Fibromyalgia     on Lyrica    Heart failure     native heart cardiomyopathy    Heart transplanted 1993    due to cardiomyopathy    History of hyperparathyroidism; Hyperparathyroidism, secondary renal     PT DENIES    Hypertension     Immune disorder     anti rejection meds    Iron deficiency anemia 08/15/2017    Kidney stones     passed per pt    Obesity     Other osteoporosis without current pathological fracture 08/30/2019    Shingles 2003 approx    left leg    Trouble in sleeping     Urinary incontinence      Past Surgical History:   Procedure Laterality Date    BLADDER SURGERY  2015 approx    mesh - Dr Everett then 2nd reconstructive sx Dr Onofre    BREAST BIOPSY Bilateral     NEGATIVE    BREAST LUMPECTOMY Left 2021    BREAST SURGERY Left 09/28/2015    Bx - benign    BREAST SURGERY Right 12/2015    Bx benign    CARDIAC PACEMAKER REMOVAL Left 06/26/2014    Pacer defirillator removed. Put in 1993 aat time of heart transplant    CARPAL TUNNEL RELEASE Left 03/03/2015    Dr. Hall    COLONOSCOPY N/A 02/25/2021    Procedure: COLONOSCOPY;  Surgeon: Freida Ramirez MD;  Location[facility];  Service: Endoscopy;  Laterality: N/A;    HEART TRANSPLANT  1993    HERNIA REPAIR Right 1971 approx    Inguinal    HYSTERECTOMY  1983    vag hyst /LSO     INCISION AND DRAINAGE OF ABSCESS Left 12/24/2021    Procedure: INCISION AND DRAINAGE, ABSCESS;  Surgeon: Joseph Longo MD;  Location: [facility];  Service: General;  Laterality: Left;    INSERTION OF TUNNELED CENTRAL VENOUS CATHETER (CVC) WITH SUBCUTANEOUS PORT N/A 11/09/2021    Procedure: [id number];  Surgeon: Christoph Douglas MD;  Location: [facility] OR;  Service: General;  Laterality: N/A;    REMOVAL OF VASCULAR ACCESS PORT      SENTINEL LYMPH NODE BIOPSY Left  10/12/2021    Procedure: BIOPSY, LYMPH NODE, SENTINEL;  Surgeon: Christoph Douglas MD;  Location: [facility];  Service: General;  Laterality: Left;    TOE SURGERY       Family History   Problem Relation Age of Onset    Cancer Mother 38        breast    Breast cancer Mother     Heart disease Maternal Grandmother     Cataracts Cousin     Hypertension Son     Diabetes Neg Hx     Stroke Neg Hx     Kidney disease Neg Hx     Asthma Neg Hx     COPD Neg Hx     Melanoma Neg Hx     Hyperlipidemia Neg Hx      Social History     Socioeconomic History    Marital status: Single    Number of children: 2    Highest education level: 11th grade   Occupational History    Occupation: Retired   Tobacco Use    Smoking status: Never Smoker    Smokeless tobacco: Never Used   Substance and Sexual Activity    Alcohol use: Never     Alcohol/week: 0.0 standard drinks    Drug use: No    Sexual activity: Not Currently     Partners: Male     Birth control/protection: See Surgical Hx   Other Topics Concern    Are you pregnant or think you may be? No    Breast-feeding No   Social History Narrative    Single. 2 children , 1  at 30 yoa   strep throat -  pneumonia and renal complications after not completing course of AB. Other child lives in [place], [place]. Has a cousin locally that could help in an emergency. 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 Caffeine intake =- 1 cola a day. No coffee, + occasional tea, avoids caffeine especially at night. Still drives. She does not have a Living Will or Advanced directive.      Patient Active Problem List   Diagnosis    Heart transplanted    Anxiety    Insomnia    CKD (chronic kidney disease) stage 4, GFR 15-29 ml/min    Immunosuppression due to drug therapy    Fibromyalgia    Gastroesophageal reflux disease without esophagitis    Breast screening    Immunization deficiency    Essential hypertension    Aortic atherosclerosis    Chronic major  depressive disorder, recurrent episode    Iron deficiency anemia    Vaginal atrophy    Hyperparathyroidism    Hx of falling    Chronic midline low back pain without sciatica    Closed nondisplaced fracture of distal phalanx of left great toe with routine healing    Lightheadedness    Medication side effects    Obesity (BMI 30.0-34.9)    Edema    Asymptomatic menopausal state     Other osteoporosis without current pathological fracture    Cough    Abnormal CT scan, kidney    Weakness of both lower extremities    Immunocompromised patient    Osteoporosis, post-menopausal    Ductal carcinoma in situ (DCIS) of left breast    Immunodeficiency secondary to radiation therapy    Abnormal mammogram    The hangs, uncomplicated    Severe sepsis    GRACE (acute kidney injury)    Diarrhea    Thrombocytopenia, unspecified    Immunodeficiency due to chemotherapy    C. difficile colitis    Strain of left shoulder    Left shoulder pain    Ulnar neuropathy of left upper extremity    Anemia associated with stage 4 chronic renal failure    Secondary and unspecified malignant neoplasm of axilla and upper limb lymph nodes    Other chest pain    Cystitis    Abnormal thyroid function test    Hypomagnesemia    Slow transit constipation     Review of patient's allergies indicates:   Allergen Reactions    Lisinopril Swelling and Rash    Augmentin [amoxicillin-pot clavulanate] Diarrhea       Medications:  Current Outpatient Medications on File Prior to Visit   Medication Sig Dispense Refill    acetaminophen (TYLENOL) 325 MG tablet Take 1 tablet (325 mg total) by mouth every 6 (six) hours as needed for Pain.  0    amLODIPine (NORVASC) 2.5 MG tablet       aspirin (ECOTRIN) 81 MG EC tablet Take 1 tablet (81 mg total) by mouth once daily. 90 tablet 3    biotin 10,000 mcg Cap Take 1 tablet by mouth once daily.      busPIRone (BUSPAR) 10 MG tablet Take 1 tablet (10 mg total) by mouth once daily. 90 tablet 3     calcitonin, salmon, (FORTICAL) 200 unit/actuation nasal spray 1 spray by Nasal route once daily. 3.7 mL 3    cloNIDine (CATAPRES) 0.1 MG tablet Take 1 tablet (0.1 mg total) by mouth nightly as needed (BP >150/95). 90 tablet 3    cycloSPORINE modified, NEORAL, (NEORAL) 25 MG capsule TAKE 3 CAPSULES (75 MG TOTAL) BY MOUTH 2 (TWO) TIMES DAILY. 540 capsule 6    DULoxetine (CYMBALTA) 30 MG capsule TAKE 1 CAPSULE EVERY DAY 90 capsule 1    EVENING PRIMROSE OIL ORAL Take 1,000 mg by mouth once daily.      furosemide (LASIX) 20 MG tablet Take 1 tablet (20 mg total) by mouth 2 (two) times a day for 5 days, THEN 1 tablet (20 mg total) once daily. Take 1 tablet daily. 370 tablet 0    hydrALAZINE (APRESOLINE) 50 MG tablet Take 1 tablet (50 mg total) by mouth every 8 (eight) hours. TAKE 1 TABLETS  EVERY 8  HOURS. 270 tablet 3    losartan (COZAAR) 25 MG tablet Take 1 tablet (25 mg total) by mouth once daily. 90 tablet 3    multivitamin capsule Take 1 capsule by mouth once daily.      ondansetron (ZOFRAN-ODT) 8 MG TbDL Take 1 tablet (8 mg total) by mouth every 8 (eight) hours as needed (nausea). 60 tablet 5    pantoprazole (PROTONIX) 40 MG tablet Take 1 tablet (40 mg total) by mouth once daily. 90 tablet 1    temazepam (RESTORIL) 30 mg capsule Take 1 at bedtime 90 capsule 1    vitamin E 400 UNIT capsule Take 400 Units by mouth once daily.      zolpidem (AMBIEN) 10 mg Tab TAKE 1 TABLET BY MOUTH ONCE DAILY IN THE EVENING 90 tablet 0    pregabalin (LYRICA) 50 MG capsule TAKE 1 CAPSULE 2 TIMES DAILY AT NOON AND NIGHT TIME 180 capsule 1     Current Facility-Administered Medications on File Prior to Visit   Medication Dose Route Frequency Provider Last Rate Last Admin    denosumab (PROLIA) injection 60 mg  60 mg Subcutaneous Q6 Months Prasanth Johnson MD        lactated ringers infusion   Intravenous Continuous Jennifer Carr MD 10 mL/hr at 11/09/21 0717 Restarted at 11/09/21 0820       Medications have been reviewed  and reconciled with patient at visit today.    Barriers to medications present (no )    Adverse reactions to current medications (no)    Review of Systems   Constitutional: Negative for activity change, appetite change, chills and fever.   HENT: Negative for dental problem, hearing loss and trouble swallowing.    Respiratory: Negative for cough, shortness of breath and wheezing.    Cardiovascular: Positive for leg swelling. Negative for chest pain, palpitations and claudication.   Gastrointestinal: Positive for constipation. Negative for anal bleeding, blood in stool and fecal incontinence.        C/o R flank discomfort   Genitourinary: Negative for dysuria.   Integumentary:  Positive for color change.        Skin changes secondary to radiation Tx R Upper breast healing well   Neurological: Negative for dizziness, syncope and weakness.   Psychiatric/Behavioral: Negative for sleep disturbance.        Reports mood is good - taking medication to help w anxiety - does express feelings of isolation and loneliness.  Reports sleeping well (though she does have medication to help w sleep)        Exam:  Vitals:    05/25/22 1323   BP: 122/78   Pulse: 87   Temp: 98.4 °F (36.9 °C)     Weight: 74.2 kg (163 lb 9.3 oz)   Body mass index is 29.92 kg/m².    Physical Exam  Constitutional:       General: She is not in acute distress.     Appearance: Normal appearance. She is obese. She is not ill-appearing.   HENT:      Head: Normocephalic.      Right Ear: Tympanic membrane, ear canal and external ear normal.      Left Ear: Tympanic membrane, ear canal and external ear normal.      Nose: Nose normal. No congestion or rhinorrhea.      Mouth/Throat:      Mouth: Mucous membranes are moist.      Pharynx: Oropharynx is clear. No oropharyngeal exudate or posterior oropharyngeal erythema.   Eyes:      General: No scleral icterus.        Right eye: No discharge.         Left eye: No discharge.      Extraocular Movements: Extraocular movements  intact.      Conjunctiva/sclera: Conjunctivae normal.   Cardiovascular:      Rate and Rhythm: Normal rate and regular rhythm.   Pulmonary:      Effort: Pulmonary effort is normal. No respiratory distress.      Breath sounds: Normal breath sounds. No wheezing.   Abdominal:      General: Bowel sounds are normal. There is no distension.      Palpations: Abdomen is soft.      Tenderness: There is no abdominal tenderness. There is no right CVA tenderness, left CVA tenderness or guarding.   Musculoskeletal:      Comments: Trace to 1+ LE edema   Lymphadenopathy:      Cervical: No cervical adenopathy.   Skin:     Findings: No bruising or erythema.   Neurological:      General: No focal deficit present.      Mental Status: She is alert and oriented to person, place, and time.      Cranial Nerves: No cranial nerve deficit.      Coordination: Coordination normal.      Comments: Friendly, sociable, appropriate  Some confusion regarding timeline of breast cancer diagnosis and treatment   Psychiatric:         Mood and Affect: Mood normal.         Behavior: Behavior normal.          Laboratory Reviewed: (Yes)  Lab Results   Component Value Date    WBC 3.37 (L) 05/13/2022    HGB 9.4 (L) 05/13/2022    HCT 28.5 (L) 05/13/2022     05/13/2022    CHOL 157 12/24/2021    TRIG 120 12/24/2021    HDL 42 12/24/2021    ALT 22 05/13/2022    AST 32 05/13/2022     05/13/2022    K 4.4 05/13/2022     05/13/2022    CREATININE 2.4 (H) 05/13/2022    BUN 43 (H) 05/13/2022    CO2 21 (L) 05/13/2022    TSH 5.158 (H) 12/24/2021    PSA <0.1 05/27/2008    INR 1.0 11/22/2021    HGBA1C 5.2 12/24/2021         Assessment:   66 y.o. female with multiple co-morbid illnesses here for continued follow up of medical problems.      The primary encounter diagnosis was Ductal carcinoma in situ (DCIS) of left breast. Diagnoses of Chronic major depressive disorder, recurrent episode, Essential hypertension, Heart transplanted, Hypomagnesemia,  "Immunosuppression due to drug therapy, Abnormal thyroid function test, Hyperparathyroidism, Slow transit constipation, and Anemia associated with stage 4 chronic renal failure were also pertinent to this visit.      Plan:     Problem List Items Addressed This Visit        Psychiatric    Chronic major depressive disorder, recurrent episode     Reports improved mood, less anxiety. Discussed non-medication strategies like walking in early am natural light, engaging in activities at living center, consider [facility] Opportunities? Cont current Rx - cont monitor closely              Cardiac/Vascular    Heart transplanted (Chronic)     Cont current Rx and f/u Dr. Tubbs as scheduled           Essential hypertension     Carvedilol dose adjusted epr cardiology recs - note clonidine prescribed as "prn" - Ms. Damon has been taking QHS routinely though she does not routinely monitor home BP - advised to check home BP a few times a day over coming week - unless elevated can discontinue clonidine              Renal/    Hypomagnesemia     Recheck magnesium level              Immunology/Multi System    Immunosuppression due to drug therapy     Cyclosporin s/p remote heart transplant.   Continue POC and monitor.               Oncology    Ductal carcinoma in situ (DCIS) of left breast - Primary     S/p surgery and has completed final radiation Tx - (chemo x 1 - did not tolerate) - f/u Rad/Onc 6 mos and cont surveillance mammograms            Anemia associated with stage 4 chronic renal failure     Cont f/u Heme/Onc to assess if cont epo indicated - cont monitor renal function - renal dose medications where appropriate and avoid nephrotoxin exposures              Endocrine    Hyperparathyroidism     Cont POC  - consider repeat PTHi           Abnormal thyroid function test     TSH slightly elevated - consider repeat TSH/free T4              GI    Slow transit constipation     Increase movement, fluid, fiber - can use miralax daily - " titrate up or down for goal 1-2 large soft BM daily                 Health Maintenance       Date Due Completion Date    COVID-19 Vaccine (3 - Moderna risk series) 11/25/2021 10/28/2021    Lipid Panel 12/24/2022 12/24/2021    Mammogram 05/16/2023 5/16/2022    Pneumococcal Vaccines (Age 65+) (3 - PPSV23 or PCV20) 10/22/2023 10/22/2018    DEXA Scan 08/03/2024 8/3/2021    Colorectal Cancer Screening 02/25/2026 2/25/2021    TETANUS VACCINE 09/09/2030 9/9/2020        CoVid booster due??    -Patient's lab results were reviewed and discussed with patient  -Treatment options and alternatives were discussed with the patient. Patient expressed understanding. Patient was given the opportunity to ask questions and be an active participant in their medical care. Patient had no further questions or concerns at this time.   -Patient is an overall HIGH risk for health complications from their medical conditions.     Follow up: Follow up in about 7 weeks (around 7/13/2022) for Follow Up. ricardo Dickson NP    After visit summary printed and given to patient upon discharge.  Patient care plan is included in After visit summary.    TOTAL TIME evaluating and managing this patient for this encounter was greater than 40 minutes. This time was spent personally by me on the following activities: review of patient's past medical history, assessing age-appropriate health maintenance needs, review of any interval history, review and interpretation of lab results, review and interpretation of imaging test results, review and interpretation of cardiology test results, reviewing consulting specialist notes, obtaining history from the patient and family, examination of the patient, medication reconciliation, managing and/or ordering prescription medications, ordering imaging tests, ordering referral to subspecialty provider(s), educating patient and answering their questions about diagnosis, treatment plan, and goals of treatment, discussing  planned follow-up and final documentation of the visit. This time was exclusive of any separately billable procedures for this patient and exclusive of time spent treating any other patients.     Consider recheck TSH/free T4, mag, PTHi     Consider consolidate labs 6/14 CBC, CMP & 6/17 CBC CMP cyclosporine lipid

## 2022-05-25 NOTE — PATIENT INSTRUCTIONS
Check BP tonight before taking clonidine - if < 150/95 don't need to take.    Cont other medications as prescribed    Discussed fluids, fiber - recommend cont miralax daily or every other day for goal 1-2 large soft BM daily    F/u w Luz Randolph 7/13 as scheduled

## 2022-05-30 ENCOUNTER — TELEPHONE (OUTPATIENT)
Dept: PRIMARY CARE CLINIC | Facility: CLINIC | Age: 67
End: 2022-05-30
Payer: MEDICARE

## 2022-05-30 DIAGNOSIS — E21.3 HYPERPARATHYROIDISM: ICD-10-CM

## 2022-05-30 DIAGNOSIS — E83.42 HYPOMAGNESEMIA: Primary | ICD-10-CM

## 2022-05-30 DIAGNOSIS — R94.6 ABNORMAL THYROID FUNCTION TEST: ICD-10-CM

## 2022-05-30 DIAGNOSIS — D63.1 ANEMIA DUE TO STAGE 4 CHRONIC KIDNEY DISEASE: ICD-10-CM

## 2022-05-30 DIAGNOSIS — N18.4 ANEMIA DUE TO STAGE 4 CHRONIC KIDNEY DISEASE: ICD-10-CM

## 2022-05-30 PROBLEM — K59.01 SLOW TRANSIT CONSTIPATION: Status: ACTIVE | Noted: 2022-05-25

## 2022-05-30 NOTE — TELEPHONE ENCOUNTER
----- Message from Elis Wick MD sent at 5/30/2022  6:27 AM CDT -----  Regarding: Labs 6/14  Good morning - I entered orders for some labwork for Ms. Nadia Damon - can we please schedule these to be done w other labs ordered for 6/14?    Heme/Onc had ordered labs for 6/14 so Transplant has moved their 6/17 labs to 6/14 also to save Ms. Damon that extra lab draw. The 6/14 labs though now HAVE to be done earlier in the day on 6/14 and must be FASTING - this is because the cyclosporine level has to be done fasting and usually 12 hrs after last dose (or right before next scheduled dose).     I sent Ms. Damon a [facility] message about this but don't see it's been read. Could you please call her and let her know that her labwork is now scheduled for the morning of 6/14 and must be FASTING?     Thank you!

## 2022-05-30 NOTE — ASSESSMENT & PLAN NOTE
Increase movement, fluid, fiber - can use miralax daily - titrate up or down for goal 1-2 large soft BM daily

## 2022-05-30 NOTE — ASSESSMENT & PLAN NOTE
"Carvedilol dose adjusted epr cardiology recs - note clonidine prescribed as "prn" - Ms. Damon has been taking QHS routinely though she does not routinely monitor home BP - advised to check home BP a few times a day over coming week - unless elevated can discontinue clonidine  "

## 2022-05-30 NOTE — ASSESSMENT & PLAN NOTE
S/p surgery and has completed final radiation Tx - (chemo x 1 - did not tolerate) - f/u Rad/Onc 6 mos and cont surveillance mammograms

## 2022-05-30 NOTE — ASSESSMENT & PLAN NOTE
Reports improved mood, less anxiety. Discussed non-medication strategies like walking in early am natural light, engaging in activities at living center, consider [facility] Opportunities? Cont current Rx - cont monitor closely

## 2022-05-30 NOTE — ASSESSMENT & PLAN NOTE
Cont f/u Heme/Onc to assess if cont epo indicated - cont monitor renal function - renal dose medications where appropriate and avoid nephrotoxin exposures

## 2022-06-14 ENCOUNTER — OFFICE VISIT (OUTPATIENT)
Dept: HEMATOLOGY/ONCOLOGY | Facility: CLINIC | Age: 67
End: 2022-06-14
Payer: MEDICARE

## 2022-06-14 ENCOUNTER — INFUSION (OUTPATIENT)
Dept: INFUSION THERAPY | Facility: HOSPITAL | Age: 67
End: 2022-06-14
Attending: NURSE PRACTITIONER
Payer: MEDICARE

## 2022-06-14 VITALS
HEART RATE: 98 BPM | RESPIRATION RATE: 18 BRPM | SYSTOLIC BLOOD PRESSURE: 148 MMHG | TEMPERATURE: 97 F | DIASTOLIC BLOOD PRESSURE: 90 MMHG | OXYGEN SATURATION: 98 %

## 2022-06-14 DIAGNOSIS — D63.1 ANEMIA ASSOCIATED WITH STAGE 4 CHRONIC RENAL FAILURE: ICD-10-CM

## 2022-06-14 DIAGNOSIS — D63.1 ANEMIA ASSOCIATED WITH STAGE 4 CHRONIC RENAL FAILURE: Primary | ICD-10-CM

## 2022-06-14 DIAGNOSIS — C77.3 SECONDARY AND UNSPECIFIED MALIGNANT NEOPLASM OF AXILLA AND UPPER LIMB LYMPH NODES: ICD-10-CM

## 2022-06-14 DIAGNOSIS — D84.9 IMMUNOCOMPROMISED PATIENT: ICD-10-CM

## 2022-06-14 DIAGNOSIS — N18.4 ANEMIA ASSOCIATED WITH STAGE 4 CHRONIC RENAL FAILURE: ICD-10-CM

## 2022-06-14 DIAGNOSIS — M81.8 OTHER OSTEOPOROSIS WITHOUT CURRENT PATHOLOGICAL FRACTURE: ICD-10-CM

## 2022-06-14 DIAGNOSIS — D05.12 DUCTAL CARCINOMA IN SITU (DCIS) OF LEFT BREAST: Primary | ICD-10-CM

## 2022-06-14 DIAGNOSIS — D50.9 IRON DEFICIENCY ANEMIA, UNSPECIFIED IRON DEFICIENCY ANEMIA TYPE: ICD-10-CM

## 2022-06-14 DIAGNOSIS — N18.4 ANEMIA ASSOCIATED WITH STAGE 4 CHRONIC RENAL FAILURE: Primary | ICD-10-CM

## 2022-06-14 PROCEDURE — 3066F NEPHROPATHY DOC TX: CPT | Mod: CPTII,95,,

## 2022-06-14 PROCEDURE — 63600175 PHARM REV CODE 636 W HCPCS: Mod: JG

## 2022-06-14 PROCEDURE — 1157F PR ADVANCE CARE PLAN OR EQUIV PRESENT IN MEDICAL RECORD: ICD-10-PCS | Mod: CPTII,95,,

## 2022-06-14 PROCEDURE — 99499 UNLISTED E&M SERVICE: CPT | Mod: 95,,,

## 2022-06-14 PROCEDURE — 1157F ADVNC CARE PLAN IN RCRD: CPT | Mod: CPTII,95,,

## 2022-06-14 PROCEDURE — 4010F PR ACE/ARB THEARPY RXD/TAKEN: ICD-10-PCS | Mod: CPTII,95,,

## 2022-06-14 PROCEDURE — 4010F ACE/ARB THERAPY RXD/TAKEN: CPT | Mod: CPTII,95,,

## 2022-06-14 PROCEDURE — 99499 RISK ADDL DX/OHS AUDIT: ICD-10-PCS | Mod: 95,,,

## 2022-06-14 PROCEDURE — 99214 PR OFFICE/OUTPT VISIT, EST, LEVL IV, 30-39 MIN: ICD-10-PCS | Mod: 95,,,

## 2022-06-14 PROCEDURE — 99214 OFFICE O/P EST MOD 30 MIN: CPT | Mod: 95,,,

## 2022-06-14 PROCEDURE — 3066F PR DOCUMENTATION OF TREATMENT FOR NEPHROPATHY: ICD-10-PCS | Mod: CPTII,95,,

## 2022-06-14 PROCEDURE — 96372 THER/PROPH/DIAG INJ SC/IM: CPT

## 2022-06-14 RX ADMIN — EPOETIN ALFA-EPBX 20000 UNITS: 20000 INJECTION, SOLUTION INTRAVENOUS; SUBCUTANEOUS at 03:06

## 2022-06-14 NOTE — DISCHARGE INSTRUCTIONS
..[facility]  [address] [address]  [address]  [phone] phone     [phone] fax  Hours of Operation: Monday- Friday 8:00am- 5:00pm  After hours phone  [phone]  Hematology / Oncology Physicians on call    Dr. Dennis Calderon      Nurse Practitioners:    ONELIA Ahmadi NP Tyesha Taylor, NP April Normand, NP      Please don't hesitate to call if you have any concerns.

## 2022-06-15 ENCOUNTER — TELEPHONE (OUTPATIENT)
Dept: TRANSPLANT | Facility: CLINIC | Age: 67
End: 2022-06-15
Payer: MEDICARE

## 2022-06-15 ENCOUNTER — TELEPHONE (OUTPATIENT)
Dept: PRIMARY CARE CLINIC | Facility: CLINIC | Age: 67
End: 2022-06-15
Payer: MEDICARE

## 2022-06-15 ENCOUNTER — PATIENT MESSAGE (OUTPATIENT)
Dept: TRANSPLANT | Facility: CLINIC | Age: 67
End: 2022-06-15
Payer: MEDICARE

## 2022-06-15 DIAGNOSIS — M81.0 OSTEOPOROSIS, UNSPECIFIED OSTEOPOROSIS TYPE, UNSPECIFIED PATHOLOGICAL FRACTURE PRESENCE: ICD-10-CM

## 2022-06-15 RX ORDER — CALCITONIN SALMON 200 [IU]/.09ML
1 SPRAY, METERED NASAL DAILY
Qty: 3.7 ML | Refills: 7 | Status: SHIPPED | OUTPATIENT
Start: 2022-06-15 | End: 2022-08-16

## 2022-06-15 NOTE — TELEPHONE ENCOUNTER
Reviewed lab results w Ms. Damon. Unfortunately she did not have cyclosporine drawn as fasting as advised so this will be repeated as fasting on Friday, June 17th.    Ms. Damon reports she was no longer using the intra-nasal calcitonin-salmon. Given that PHTi level still quite elevated I recommended to resume until next f/u w her Rheumatologist, Dr. Johnson. Prescription re-ordered.  Consider check calcitriol level?    Ms. Damon next [facility] appointment w Luz Dickson in July. Advised to call if any questions or concerns before then.

## 2022-06-15 NOTE — TELEPHONE ENCOUNTER
Spoke with pt and she did take her Cyclosporine prior to the lab draw on 6/14/22. Scheduled a level on 6/17/22.

## 2022-06-17 ENCOUNTER — LAB VISIT (OUTPATIENT)
Dept: LAB | Facility: HOSPITAL | Age: 67
End: 2022-06-17
Attending: INTERNAL MEDICINE
Payer: MEDICARE

## 2022-06-17 DIAGNOSIS — T86.20 COMPLICATION OF HEART TRANSPLANT, UNSPECIFIED COMPLICATION: ICD-10-CM

## 2022-06-17 DIAGNOSIS — R06.02 SHORTNESS OF BREATH: ICD-10-CM

## 2022-06-17 DIAGNOSIS — I10 ESSENTIAL HYPERTENSION: ICD-10-CM

## 2022-06-17 DIAGNOSIS — Z79.52 LONG TERM CURRENT USE OF SYSTEMIC STEROIDS: ICD-10-CM

## 2022-06-17 DIAGNOSIS — E78.2 MIXED HYPERLIPIDEMIA: ICD-10-CM

## 2022-06-17 DIAGNOSIS — Z94.1 STATUS POST HEART TRANSPLANT: ICD-10-CM

## 2022-06-17 DIAGNOSIS — Z79.899 ENCOUNTER FOR LONG-TERM (CURRENT) USE OF MEDICATIONS: ICD-10-CM

## 2022-06-17 PROCEDURE — 36415 COLL VENOUS BLD VENIPUNCTURE: CPT | Performed by: INTERNAL MEDICINE

## 2022-06-17 PROCEDURE — 86977 RBC SERUM PRETX INCUBJ/INHIB: CPT | Mod: 59 | Performed by: INTERNAL MEDICINE

## 2022-06-17 PROCEDURE — 80158 DRUG ASSAY CYCLOSPORINE: CPT | Performed by: INTERNAL MEDICINE

## 2022-06-17 PROCEDURE — 86832 HLA CLASS I HIGH DEFIN QUAL: CPT | Mod: 59 | Performed by: INTERNAL MEDICINE

## 2022-06-17 PROCEDURE — 86833 HLA CLASS II HIGH DEFIN QUAL: CPT | Performed by: INTERNAL MEDICINE

## 2022-06-17 PROCEDURE — 86832 HLA CLASS I HIGH DEFIN QUAL: CPT | Performed by: INTERNAL MEDICINE

## 2022-06-17 PROCEDURE — 86833 HLA CLASS II HIGH DEFIN QUAL: CPT | Mod: 59 | Performed by: INTERNAL MEDICINE

## 2022-06-18 LAB — CYCLOSPORINE BLD LC/MS/MS-MCNC: 101 NG/ML (ref 100–400)

## 2022-06-21 ENCOUNTER — HOSPITAL ENCOUNTER (OUTPATIENT)
Dept: CARDIOLOGY | Facility: HOSPITAL | Age: 67
Discharge: HOME | End: 2022-06-21
Attending: INTERNAL MEDICINE
Payer: MEDICARE

## 2022-06-21 ENCOUNTER — OFFICE VISIT (OUTPATIENT)
Dept: TRANSPLANT | Facility: CLINIC | Age: 67
End: 2022-06-21
Payer: MEDICARE

## 2022-06-21 VITALS
DIASTOLIC BLOOD PRESSURE: 70 MMHG | HEIGHT: 62 IN | HEART RATE: 104 BPM | DIASTOLIC BLOOD PRESSURE: 76 MMHG | WEIGHT: 166.25 LBS | SYSTOLIC BLOOD PRESSURE: 124 MMHG | SYSTOLIC BLOOD PRESSURE: 124 MMHG | BODY MASS INDEX: 30.59 KG/M2 | HEIGHT: 62 IN | BODY MASS INDEX: 30 KG/M2 | WEIGHT: 163 LBS | HEART RATE: 88 BPM

## 2022-06-21 DIAGNOSIS — I10 ESSENTIAL HYPERTENSION: ICD-10-CM

## 2022-06-21 DIAGNOSIS — D05.12 DUCTAL CARCINOMA IN SITU (DCIS) OF LEFT BREAST: ICD-10-CM

## 2022-06-21 DIAGNOSIS — D84.821 IMMUNOSUPPRESSION DUE TO DRUG THERAPY: ICD-10-CM

## 2022-06-21 DIAGNOSIS — Z79.899 IMMUNOSUPPRESSION DUE TO DRUG THERAPY: ICD-10-CM

## 2022-06-21 DIAGNOSIS — E78.49 OTHER HYPERLIPIDEMIA: ICD-10-CM

## 2022-06-21 DIAGNOSIS — N18.4 CKD (CHRONIC KIDNEY DISEASE) STAGE 4, GFR 15-29 ML/MIN: ICD-10-CM

## 2022-06-21 DIAGNOSIS — Z94.1 HEART TRANSPLANTED: Primary | Chronic | ICD-10-CM

## 2022-06-21 DIAGNOSIS — Z94.1 STATUS POST HEART TRANSPLANT: ICD-10-CM

## 2022-06-21 DIAGNOSIS — I70.0 AORTIC ATHEROSCLEROSIS: ICD-10-CM

## 2022-06-21 LAB
AORTIC ROOT ANNULUS: 3.17 CM
ASCENDING AORTA: 3.12 CM
AV INDEX (PROSTH): 0.92
AV MEAN GRADIENT: 3 MMHG
AV PEAK GRADIENT: 5 MMHG
AV VALVE AREA: 3.24 CM2
AV VELOCITY RATIO: 0.86
BSA FOR ECHO PROCEDURE: 1.8 M2
CV ECHO LV RWT: 0.31 CM
DOP CALC AO PEAK VEL: 1.13 M/S
DOP CALC AO VTI: 21.1 CM
DOP CALC LVOT AREA: 3.5 CM2
DOP CALC LVOT DIAMETER: 2.12 CM
DOP CALC LVOT PEAK VEL: 0.97 M/S
DOP CALC LVOT STROKE VOLUME: 68.45 CM3
DOP CALC RVOT PEAK VEL: 0.45 M/S
DOP CALC RVOT VTI: 8.5 CM
DOP CALCLVOT PEAK VEL VTI: 19.4 CM
E WAVE DECELERATION TIME: 148.82 MSEC
E/A RATIO: 1.86
E/E' RATIO: 8.2 M/S
ECHO LV POSTERIOR WALL: 0.77 CM (ref 0.6–1.1)
EJECTION FRACTION: 60 %
FRACTIONAL SHORTENING: 34 % (ref 28–44)
INTERVENTRICULAR SEPTUM: 0.83 CM (ref 0.6–1.1)
IVRT: 95.15 MSEC
LEFT INTERNAL DIMENSION IN SYSTOLE: 3.25 CM (ref 2.1–4)
LEFT VENTRICLE DIASTOLIC VOLUME INDEX: 65.28 ML/M2
LEFT VENTRICLE DIASTOLIC VOLUME: 115.55 ML
LEFT VENTRICLE MASS INDEX: 75 G/M2
LEFT VENTRICLE SYSTOLIC VOLUME INDEX: 24 ML/M2
LEFT VENTRICLE SYSTOLIC VOLUME: 42.56 ML
LEFT VENTRICULAR INTERNAL DIMENSION IN DIASTOLE: 4.95 CM (ref 3.5–6)
LEFT VENTRICULAR MASS: 133.49 G
LV LATERAL E/E' RATIO: 6.31 M/S
LV SEPTAL E/E' RATIO: 11.71 M/S
LVOT MG: 2.09 MMHG
LVOT MV: 0.68 CM/S
MV PEAK A VEL: 0.44 M/S
MV PEAK E VEL: 0.82 M/S
MV STENOSIS PRESSURE HALF TIME: 43.16 MS
MV VALVE AREA P 1/2 METHOD: 5.1 CM2
PISA TR MAX VEL: 2.92 M/S
PV MEAN GRADIENT: 0.45 MMHG
RA PRESSURE: 8 MMHG
SINUS: 3.06 CM
STJ: 2.88 CM
TDI LATERAL: 0.13 M/S
TDI SEPTAL: 0.07 M/S
TDI: 0.1 M/S
TR MAX PG: 34 MMHG
TR MEAN GRADIENT: 22 MMHG
TRICUSPID ANNULAR PLANE SYSTOLIC EXCURSION: 0.89 CM
TV REST PULMONARY ARTERY PRESSURE: 42 MMHG

## 2022-06-21 PROCEDURE — 93306 ECHO (CUPID ONLY): ICD-10-PCS | Mod: 26,,, | Performed by: INTERNAL MEDICINE

## 2022-06-21 PROCEDURE — 1157F PR ADVANCE CARE PLAN OR EQUIV PRESENT IN MEDICAL RECORD: ICD-10-PCS | Mod: CPTII,S$GLB,, | Performed by: INTERNAL MEDICINE

## 2022-06-21 PROCEDURE — 3008F PR BODY MASS INDEX (BMI) DOCUMENTED: ICD-10-PCS | Mod: CPTII,S$GLB,, | Performed by: INTERNAL MEDICINE

## 2022-06-21 PROCEDURE — 3066F NEPHROPATHY DOC TX: CPT | Mod: CPTII,S$GLB,, | Performed by: INTERNAL MEDICINE

## 2022-06-21 PROCEDURE — 99999 PR PBB SHADOW E&M-EST. PATIENT-LVL II: ICD-10-PCS | Mod: PBBFAC,,, | Performed by: INTERNAL MEDICINE

## 2022-06-21 PROCEDURE — 3078F DIAST BP <80 MM HG: CPT | Mod: CPTII,S$GLB,, | Performed by: INTERNAL MEDICINE

## 2022-06-21 PROCEDURE — 99214 OFFICE O/P EST MOD 30 MIN: CPT | Mod: S$GLB,,, | Performed by: INTERNAL MEDICINE

## 2022-06-21 PROCEDURE — 99499 UNLISTED E&M SERVICE: CPT | Mod: S$GLB,,, | Performed by: INTERNAL MEDICINE

## 2022-06-21 PROCEDURE — 4010F ACE/ARB THERAPY RXD/TAKEN: CPT | Mod: CPTII,S$GLB,, | Performed by: INTERNAL MEDICINE

## 2022-06-21 PROCEDURE — 1126F PR PAIN SEVERITY QUANTIFIED, NO PAIN PRESENT: ICD-10-PCS | Mod: CPTII,S$GLB,, | Performed by: INTERNAL MEDICINE

## 2022-06-21 PROCEDURE — 1159F MED LIST DOCD IN RCRD: CPT | Mod: CPTII,S$GLB,, | Performed by: INTERNAL MEDICINE

## 2022-06-21 PROCEDURE — 93306 TTE W/DOPPLER COMPLETE: CPT

## 2022-06-21 PROCEDURE — 1160F PR REVIEW ALL MEDS BY PRESCRIBER/CLIN PHARMACIST DOCUMENTED: ICD-10-PCS | Mod: CPTII,S$GLB,, | Performed by: INTERNAL MEDICINE

## 2022-06-21 PROCEDURE — 99214 PR OFFICE/OUTPT VISIT, EST, LEVL IV, 30-39 MIN: ICD-10-PCS | Mod: S$GLB,,, | Performed by: INTERNAL MEDICINE

## 2022-06-21 PROCEDURE — 3078F PR MOST RECENT DIASTOLIC BLOOD PRESSURE < 80 MM HG: ICD-10-PCS | Mod: CPTII,S$GLB,, | Performed by: INTERNAL MEDICINE

## 2022-06-21 PROCEDURE — 3066F PR DOCUMENTATION OF TREATMENT FOR NEPHROPATHY: ICD-10-PCS | Mod: CPTII,S$GLB,, | Performed by: INTERNAL MEDICINE

## 2022-06-21 PROCEDURE — 93306 TTE W/DOPPLER COMPLETE: CPT | Mod: 26,,, | Performed by: INTERNAL MEDICINE

## 2022-06-21 PROCEDURE — 99999 PR PBB SHADOW E&M-EST. PATIENT-LVL II: CPT | Mod: PBBFAC,,, | Performed by: INTERNAL MEDICINE

## 2022-06-21 PROCEDURE — 3008F BODY MASS INDEX DOCD: CPT | Mod: CPTII,S$GLB,, | Performed by: INTERNAL MEDICINE

## 2022-06-21 PROCEDURE — 3074F PR MOST RECENT SYSTOLIC BLOOD PRESSURE < 130 MM HG: ICD-10-PCS | Mod: CPTII,S$GLB,, | Performed by: INTERNAL MEDICINE

## 2022-06-21 PROCEDURE — 1126F AMNT PAIN NOTED NONE PRSNT: CPT | Mod: CPTII,S$GLB,, | Performed by: INTERNAL MEDICINE

## 2022-06-21 PROCEDURE — 1160F RVW MEDS BY RX/DR IN RCRD: CPT | Mod: CPTII,S$GLB,, | Performed by: INTERNAL MEDICINE

## 2022-06-21 PROCEDURE — 1157F ADVNC CARE PLAN IN RCRD: CPT | Mod: CPTII,S$GLB,, | Performed by: INTERNAL MEDICINE

## 2022-06-21 PROCEDURE — 3074F SYST BP LT 130 MM HG: CPT | Mod: CPTII,S$GLB,, | Performed by: INTERNAL MEDICINE

## 2022-06-21 PROCEDURE — 1159F PR MEDICATION LIST DOCUMENTED IN MEDICAL RECORD: ICD-10-PCS | Mod: CPTII,S$GLB,, | Performed by: INTERNAL MEDICINE

## 2022-06-21 PROCEDURE — 4010F PR ACE/ARB THEARPY RXD/TAKEN: ICD-10-PCS | Mod: CPTII,S$GLB,, | Performed by: INTERNAL MEDICINE

## 2022-06-21 PROCEDURE — 99499 RISK ADDL DX/OHS AUDIT: ICD-10-PCS | Mod: S$GLB,,, | Performed by: INTERNAL MEDICINE

## 2022-06-21 RX ORDER — PRAVASTATIN SODIUM 20 MG/1
20 TABLET ORAL DAILY
Qty: 90 TABLET | Refills: 3 | Status: SHIPPED | OUTPATIENT
Start: 2022-06-21 | End: 2022-08-18

## 2022-06-21 NOTE — PROGRESS NOTES
Subjective:   Ms. Damon is a 66 y.o. year old Black or  female who received a  heart transplant on 5/27/93.          HPI  Ms. Daomn is a very pleasant 60 y/o AAF s/p OHTx 5/27/93 at [facility], s/p PPM same date, mild anemia and leukopenia, OA, cervical spine DJD with radiculopathy and chronic neck pain who presents for her 29th post annual transplant evaluation. Last year soon after her annual was found to have BRCA, underwent excision, chemo/radiation, had a rough time of it, with some complications related to it, however has now almost completely recovered and rang the bell stating she was cancer free in April of this year. She looks wonderful as always, states stamina remains something she needs to work on, gets tired easily. Taking shots for anemia monthly. Not exercising just yet.  Patient denies N/V/F/C, lightheadedness, dizziness, PND, orthopnea, LE edema, abdominal pain, abdominal pressure, chest pain, chest pressure, syncope, pre-syncope.    Echo from today pending but LVEF appears normal    CXR not completed today.     DSE 05/24/21:  · The left ventricle is normal in size with concentric remodeling and normal systolic function.  · The patient reached the end of the protocol.  · There were no arrhythmias during stress.  · Severe left atrial enlargement.  · The estimated ejection fraction is 55%.  · Grade II left ventricular diastolic dysfunction.  · Normal right ventricular size with normal right ventricular systolic function.  · Mild-to-moderate mitral regurgitation.  · Mild to moderate tricuspid regurgitation.  · Normal central venous pressure (3 mmHg).  · The estimated PA systolic pressure is 39 mmHg.  · The stress echo portion of this study is negative for myocardial ischemia.  · Severe left atrial enlargement.  · The ECG portion of this study is negative for myocardial ischemia.    CXR 05/24/21: no acute findings.     Review of Systems   Constitutional: Negative. Negative  "for chills, decreased appetite, diaphoresis, fever, malaise/fatigue, night sweats, weight gain and weight loss.   Eyes: Negative.  Negative for visual disturbance.   Cardiovascular: Positive for leg swelling (wears compression socks). Negative for chest pain, claudication, cyanosis, dyspnea on exertion, irregular heartbeat, near-syncope, orthopnea, palpitations, paroxysmal nocturnal dyspnea and syncope.   Respiratory: Negative for cough, hemoptysis, shortness of breath, sleep disturbances due to breathing, snoring, sputum production and wheezing.    Endocrine: Negative.    Hematologic/Lymphatic: Negative.  Negative for adenopathy and bleeding problem. Does not bruise/bleed easily.   Skin: Negative for color change, dry skin, nail changes, poor wound healing, rash, skin cancer and suspicious lesions.   Musculoskeletal: Negative.  Negative for back pain, falls, gout, joint pain and muscle weakness.   Gastrointestinal: Negative.  Negative for bloating, abdominal pain, anorexia, constipation, diarrhea, heartburn, hematemesis, hematochezia, hemorrhoids, melena, nausea and vomiting.   Genitourinary: Negative.  Negative for nocturia.   Neurological: Negative for excessive daytime sleepiness, dizziness, focal weakness, headaches, light-headedness, paresthesias, tremors and weakness.   Psychiatric/Behavioral: Negative for depression and memory loss. The patient does not have insomnia and is not nervous/anxious.        Objective:   Blood pressure 124/70, pulse 104, height 5' 2" (1.575 m), weight 75.4 kg (166 lb 3.6 oz), last menstrual period 06/20/1983.body mass index is 30.4 kg/m².    Physical Exam  Vitals and nursing note reviewed.   Constitutional:       General: She is not in acute distress.     Appearance: She is well-developed. She is not diaphoretic.   HENT:      Head: Normocephalic and atraumatic.      Mouth/Throat:      Pharynx: No oropharyngeal exudate.   Eyes:      General: No scleral icterus.     " Conjunctiva/sclera: Conjunctivae normal.      Pupils: Pupils are equal, round, and reactive to light.   Neck:      Vascular: No JVD.   Cardiovascular:      Rate and Rhythm: Regular rhythm. Tachycardia present.      Chest Wall: PMI is not displaced.      Heart sounds: Normal heart sounds. No murmur heard.    No friction rub. No gallop.   Pulmonary:      Effort: Pulmonary effort is normal. No respiratory distress.      Breath sounds: Normal breath sounds. No wheezing or rales.   Chest:      Chest wall: No tenderness.   Abdominal:      General: Bowel sounds are normal. There is no distension.      Palpations: Abdomen is soft. There is no mass.      Tenderness: There is no abdominal tenderness. There is no guarding or rebound.   Musculoskeletal:         General: No tenderness. Normal range of motion.      Cervical back: Normal range of motion and neck supple.   Skin:     General: Skin is warm and dry.      Coloration: Skin is not pale.      Findings: No erythema or rash.   Neurological:      Mental Status: She is alert and oriented to person, place, and time.   Psychiatric:         Behavior: Behavior normal.         Thought Content: Thought content normal.         Judgment: Judgment normal.         Lab Results   Component Value Date    WBC 3.88 (L) 06/14/2022    HGB 9.4 (L) 06/14/2022    HCT 30.0 (L) 06/14/2022    MCV 85 06/14/2022     06/14/2022    CO2 23 06/14/2022    CREATININE 2.6 (H) 06/14/2022    CALCIUM 9.1 06/14/2022    ALBUMIN 3.5 06/14/2022    AST 42 (H) 06/14/2022     (H) 06/14/2022    ALT 29 06/14/2022    .4 (H) 06/14/2022       Lab Results   Component Value Date    INR 1.0 11/22/2021    INR 1.0 11/10/2020    INR 1.0 01/07/2018       BNP   Date Value Ref Range Status   06/14/2022 188 (H) 0 - 99 pg/mL Final     Comment:     Values of less than 100 pg/ml are consistent with non-CHF populations.   04/06/2022 121 (H) 0 - 99 pg/mL Final     Comment:     Values of less than 100 pg/ml are  consistent with non-CHF populations.   12/23/2021 190 (H) 0 - 99 pg/mL Final     Comment:     Values of less than 100 pg/ml are consistent with non-CHF populations.          Date Value Ref Range Status   08/02/2007 315 (H) 110 - 260 U/L Final     No results found for: SIROLIMUS  Cyclosporine, LC/MS   Date Value Ref Range Status   06/17/2022 101 100 - 400 ng/mL Final     Comment:     Reference Normals:  For Kidney Transplants: 100-300 ng/mL    Testing performed by Liquid Chromatography-Tandem  Mass Spectrometry (LC-MS/MS).    This test was developed and its performance characteristics  determined by [facility], Department of Pathology  and Laboratory Medicine in a manner consistent with CLIA  requirements. It has not been cleared or approved by the [place]  Food and Drug Administration.  This test is used for clinical  purposes.  It should not be regarded as investigational or for  research.         Assessment:     1. Heart transplanted    2. Essential hypertension    3. Immunosuppression due to drug therapy    4. Ductal carcinoma in situ (DCIS) of left breast    5. Aortic atherosclerosis    6. Other hyperlipidemia    7. CKD (chronic kidney disease) stage 4, GFR 15-29 ml/min        Plan:   Patient doing well after recovering from BRCA s/p chemo/radiation, now cancer free (April this year, canncer free).  Would like to start pravachol 20mg po daily, repeat lipid panel in 3 months.   Otherwise seems to have nearly full recovery.  Echo pending however LVEF on visual inspection normal.   Doing wonderful, recommend exercise regimen to try to build back stamina.   Consider nephrology followup but CKD seems stable based on creatinine. Followed by Yvette carmona at [facility].  Do not see chest xray for annual eval, will get one today before leaving.   Otherwise doing great, amazing to see how she is recovering from BRCA.   Aortic atherosclerosis/hyperlipidemia- start pravachol 20mg po daily.   Return instructions as  set forth by post transplant schedule or as needed:    Clinic: Return for labs and/or biopsy weekly the first month, every two weeks during month 2 and then monthly for the first year at the provider or coordinator's discretion. During the second year, return to clinic every 3 months. Post transplant year 3-5 return every 6 months. There will be a comprehensive post transplant evaluation every year that may include LHC/RHC/biopsy, stress test, echo, CXR, and other health screening exams.    In addition to the clinical assessment, I have ordered Allomap testing for this patient to assist in identification of moderate/severe acute cellular rejection (ACR) in a pt with stable Allograft function instead of endomyocardial biopsy.     Patient is reminded to call with any health changes as these can be early signs of transplant complications. Patient is advised to make sure any new medications or changes of old medications are discussed with a pharmacist or physician knowledgeable with transplant to avoid rejection/drug toxicity related to significant drug interactions.    UNOS Patient Status  Functional Status: 80% - Normal activity with effort: some symptoms of disease  Physical Capacity: No Limitations  Working for Income: No  If no, reason not working: Demands of Treatment    Courtney Tubbs MD

## 2022-07-13 ENCOUNTER — HOSPITAL ENCOUNTER (OUTPATIENT)
Dept: RADIOLOGY | Facility: HOSPITAL | Age: 67
Discharge: HOME | End: 2022-07-13
Attending: NURSE PRACTITIONER
Payer: MEDICARE

## 2022-07-13 ENCOUNTER — OFFICE VISIT (OUTPATIENT)
Dept: PRIMARY CARE CLINIC | Facility: CLINIC | Age: 67
End: 2022-07-13
Payer: MEDICARE

## 2022-07-13 ENCOUNTER — IMMUNIZATION (OUTPATIENT)
Dept: PHARMACY | Facility: CLINIC | Age: 67
End: 2022-07-13
Payer: MEDICARE

## 2022-07-13 VITALS
HEIGHT: 62 IN | SYSTOLIC BLOOD PRESSURE: 112 MMHG | TEMPERATURE: 97 F | BODY MASS INDEX: 30.51 KG/M2 | WEIGHT: 165.81 LBS | DIASTOLIC BLOOD PRESSURE: 64 MMHG | OXYGEN SATURATION: 99 % | HEART RATE: 86 BPM

## 2022-07-13 DIAGNOSIS — F51.01 PRIMARY INSOMNIA: ICD-10-CM

## 2022-07-13 DIAGNOSIS — Z23 NEED FOR VACCINATION: Primary | ICD-10-CM

## 2022-07-13 DIAGNOSIS — J30.9 ALLERGIC RHINITIS, UNSPECIFIED SEASONALITY, UNSPECIFIED TRIGGER: ICD-10-CM

## 2022-07-13 DIAGNOSIS — M53.3 SACROILIAC JOINT PAIN: ICD-10-CM

## 2022-07-13 DIAGNOSIS — M53.3 SI (SACROILIAC) PAIN: Primary | ICD-10-CM

## 2022-07-13 DIAGNOSIS — M53.3 SI (SACROILIAC) PAIN: ICD-10-CM

## 2022-07-13 PROCEDURE — 72110 XR LUMBAR SPINE COMPLETE 5 VIEW: ICD-10-PCS | Mod: 26,,, | Performed by: RADIOLOGY

## 2022-07-13 PROCEDURE — 3288F FALL RISK ASSESSMENT DOCD: CPT | Mod: CPTII,S$GLB,, | Performed by: NURSE PRACTITIONER

## 2022-07-13 PROCEDURE — 3066F NEPHROPATHY DOC TX: CPT | Mod: CPTII,S$GLB,, | Performed by: NURSE PRACTITIONER

## 2022-07-13 PROCEDURE — 3074F PR MOST RECENT SYSTOLIC BLOOD PRESSURE < 130 MM HG: ICD-10-PCS | Mod: CPTII,S$GLB,, | Performed by: NURSE PRACTITIONER

## 2022-07-13 PROCEDURE — 4010F ACE/ARB THERAPY RXD/TAKEN: CPT | Mod: CPTII,S$GLB,, | Performed by: NURSE PRACTITIONER

## 2022-07-13 PROCEDURE — 1157F PR ADVANCE CARE PLAN OR EQUIV PRESENT IN MEDICAL RECORD: ICD-10-PCS | Mod: CPTII,S$GLB,, | Performed by: NURSE PRACTITIONER

## 2022-07-13 PROCEDURE — 3008F PR BODY MASS INDEX (BMI) DOCUMENTED: ICD-10-PCS | Mod: CPTII,S$GLB,, | Performed by: NURSE PRACTITIONER

## 2022-07-13 PROCEDURE — 1160F RVW MEDS BY RX/DR IN RCRD: CPT | Mod: CPTII,S$GLB,, | Performed by: NURSE PRACTITIONER

## 2022-07-13 PROCEDURE — 1125F PR PAIN SEVERITY QUANTIFIED, PAIN PRESENT: ICD-10-PCS | Mod: CPTII,S$GLB,, | Performed by: NURSE PRACTITIONER

## 2022-07-13 PROCEDURE — 99999 PR PBB SHADOW E&M-EST. PATIENT-LVL V: ICD-10-PCS | Mod: PBBFAC,,, | Performed by: NURSE PRACTITIONER

## 2022-07-13 PROCEDURE — 1157F ADVNC CARE PLAN IN RCRD: CPT | Mod: CPTII,S$GLB,, | Performed by: NURSE PRACTITIONER

## 2022-07-13 PROCEDURE — 1159F MED LIST DOCD IN RCRD: CPT | Mod: CPTII,S$GLB,, | Performed by: NURSE PRACTITIONER

## 2022-07-13 PROCEDURE — 3078F DIAST BP <80 MM HG: CPT | Mod: CPTII,S$GLB,, | Performed by: NURSE PRACTITIONER

## 2022-07-13 PROCEDURE — 99215 OFFICE O/P EST HI 40 MIN: CPT | Mod: S$GLB,,, | Performed by: NURSE PRACTITIONER

## 2022-07-13 PROCEDURE — 3078F PR MOST RECENT DIASTOLIC BLOOD PRESSURE < 80 MM HG: ICD-10-PCS | Mod: CPTII,S$GLB,, | Performed by: NURSE PRACTITIONER

## 2022-07-13 PROCEDURE — 99999 PR PBB SHADOW E&M-EST. PATIENT-LVL V: CPT | Mod: PBBFAC,,, | Performed by: NURSE PRACTITIONER

## 2022-07-13 PROCEDURE — 72110 X-RAY EXAM L-2 SPINE 4/>VWS: CPT | Mod: TC

## 2022-07-13 PROCEDURE — 1101F PT FALLS ASSESS-DOCD LE1/YR: CPT | Mod: CPTII,S$GLB,, | Performed by: NURSE PRACTITIONER

## 2022-07-13 PROCEDURE — 3008F BODY MASS INDEX DOCD: CPT | Mod: CPTII,S$GLB,, | Performed by: NURSE PRACTITIONER

## 2022-07-13 PROCEDURE — 3066F PR DOCUMENTATION OF TREATMENT FOR NEPHROPATHY: ICD-10-PCS | Mod: CPTII,S$GLB,, | Performed by: NURSE PRACTITIONER

## 2022-07-13 PROCEDURE — 1160F PR REVIEW ALL MEDS BY PRESCRIBER/CLIN PHARMACIST DOCUMENTED: ICD-10-PCS | Mod: CPTII,S$GLB,, | Performed by: NURSE PRACTITIONER

## 2022-07-13 PROCEDURE — 1101F PR PT FALLS ASSESS DOC 0-1 FALLS W/OUT INJ PAST YR: ICD-10-PCS | Mod: CPTII,S$GLB,, | Performed by: NURSE PRACTITIONER

## 2022-07-13 PROCEDURE — 4010F PR ACE/ARB THEARPY RXD/TAKEN: ICD-10-PCS | Mod: CPTII,S$GLB,, | Performed by: NURSE PRACTITIONER

## 2022-07-13 PROCEDURE — 1125F AMNT PAIN NOTED PAIN PRSNT: CPT | Mod: CPTII,S$GLB,, | Performed by: NURSE PRACTITIONER

## 2022-07-13 PROCEDURE — 3074F SYST BP LT 130 MM HG: CPT | Mod: CPTII,S$GLB,, | Performed by: NURSE PRACTITIONER

## 2022-07-13 PROCEDURE — 72110 X-RAY EXAM L-2 SPINE 4/>VWS: CPT | Mod: 26,,, | Performed by: RADIOLOGY

## 2022-07-13 PROCEDURE — 1159F PR MEDICATION LIST DOCUMENTED IN MEDICAL RECORD: ICD-10-PCS | Mod: CPTII,S$GLB,, | Performed by: NURSE PRACTITIONER

## 2022-07-13 PROCEDURE — 3288F PR FALLS RISK ASSESSMENT DOCUMENTED: ICD-10-PCS | Mod: CPTII,S$GLB,, | Performed by: NURSE PRACTITIONER

## 2022-07-13 PROCEDURE — 99215 PR OFFICE/OUTPT VISIT, EST, LEVL V, 40-54 MIN: ICD-10-PCS | Mod: S$GLB,,, | Performed by: NURSE PRACTITIONER

## 2022-07-13 RX ORDER — ZOLPIDEM TARTRATE 10 MG/1
TABLET ORAL
Qty: 90 TABLET | Refills: 0 | Status: CANCELLED | OUTPATIENT
Start: 2022-07-13

## 2022-07-13 RX ORDER — AZELASTINE 1 MG/ML
2 SPRAY, METERED NASAL 2 TIMES DAILY
Qty: 30 ML | Refills: 6 | Status: SHIPPED | OUTPATIENT
Start: 2022-07-13 | End: 2022-10-05

## 2022-07-13 RX ORDER — EPOETIN ALFA-EPBX 20000 [IU]/ML
INJECTION, SOLUTION INTRAVENOUS; SUBCUTANEOUS
COMMUNITY
Start: 2022-04-13 | End: 2023-03-28 | Stop reason: ALTCHOICE

## 2022-07-13 RX ORDER — ZOLPIDEM TARTRATE 10 MG/1
TABLET ORAL
Qty: 90 TABLET | Refills: 1 | Status: SHIPPED | OUTPATIENT
Start: 2022-07-13 | End: 2022-12-28

## 2022-07-13 NOTE — PROGRESS NOTES
Nadia Damon  07/13/2022  [id number]    Luz Dickson NP  Patient Care Team:  Luz Dickson NP as PCP - General (Family Medicine)  Miguel Soni Jr., MD as Consulting Physician (Vascular Surgery)  Ike King MD as Consulting Physician (Cardiology)  Courtney Tubbs MD as Consulting Physician (Cardiology)  Carter Crawford MD as Consulting Physician (Nephrology)  Paxton Vasques OD as Consulting Physician (Optometry)  PRANAY Villalobos MD as Obstetrician (Obstetrics)  Parker Mccarthy IV, MD (Urology)  Ike King MD as Consulting Physician (Cardiology)  Jose Roland MD as Consulting Physician (Dermatology)  Prasanth Johnson MD as Consulting Physician (Rheumatology)  Ayanna Hart RN as Oncology Navigator  Nora Morin HIMA as   Elis Wick MD as Physician (Internal Medicine)        [facility] Primary Care Note      Chief Complaint:  Chief Complaint   Patient presents with    7 week f/u     RT side pain       History of Present Illness:  HPI    Two month follow up HTN, anemia, depression, CKD4.     Dr Wick 5/25/22: constipation, right side pain. Constipation has improved but right side pain is becoming worse. No improvement with APAP, heat, Biofreeze. Denies injury.     Slightly dizzy when she awoke this AM. /70. Relieved by lying down for a minute.           Review of Systems   Constitutional: Negative for chills, fever and weight loss.   HENT:        Post nasal drip   Respiratory: Negative for cough and shortness of breath.    Cardiovascular: Negative for chest pain, palpitations and leg swelling.   Gastrointestinal: Negative for abdominal pain, blood in stool, constipation, diarrhea, heartburn (occasional relieved by Pepcid), melena, nausea and vomiting.   Genitourinary: Negative for dysuria.   Musculoskeletal: Positive for back pain and myalgias. Negative for falls.         The following were reviewed: Active problem list, medication list, allergies,  family history, social history, and Health Maintenance.     History:  Past Medical History:   Diagnosis Date    Abdominal wall hernia     CT Renal 6/11/2018---Small fat containing superior ventral abdominal wall hernia at the epicardial pacing lead site.    Anxiety     Arthritis     ZEN HIPS    Breast cancer in female 08/2021    LEFT BREAST    Cellulitis of axilla, left 12/23/2021    Chronic diastolic heart failure 12/16/2021    Chronic kidney disease     stage 4, GFR 15-29 ml/min    Chronic midline low back pain without sciatica 06/18/2018    Coronary artery disease 1993    heart transplant    Depression     Fibromyalgia     on Lyrica    Heart failure     native heart cardiomyopathy    Heart transplanted 1993    due to cardiomyopathy    History of hyperparathyroidism; Hyperparathyroidism, secondary renal     PT DENIES    Hypertension     Immune disorder     anti rejection meds    Iron deficiency anemia 08/15/2017    Kidney stones     passed per pt    Obesity     Other osteoporosis without current pathological fracture 08/30/2019    Shingles 2003 approx    left leg    Trouble in sleeping     Urinary incontinence      Past Surgical History:   Procedure Laterality Date    BLADDER SURGERY  2015 approx    mesh - Dr Everett then 2nd reconstructive sx Dr Onofre    BREAST BIOPSY Bilateral     NEGATIVE    BREAST LUMPECTOMY Left 2021    BREAST SURGERY Left 09/28/2015    Bx - benign    BREAST SURGERY Right 12/2015    Bx benign    CARDIAC PACEMAKER REMOVAL Left 06/26/2014    Pacer defirillator removed. Put in 1993 aat time of heart transplant    CARPAL TUNNEL RELEASE Left 03/03/2015    Dr. Hall    COLONOSCOPY N/A 02/25/2021    Procedure: COLONOSCOPY;  Surgeon: Freida Ramirez MD;  [facility];  Service: Endoscopy;  Laterality: N/A;    HEART TRANSPLANT  1993    HERNIA REPAIR Right 1971 approx    Inguinal    HYSTERECTOMY  1983    vag hyst /LSO     INCISION AND DRAINAGE OF ABSCESS  Left 2021    Procedure: INCISION AND DRAINAGE, ABSCESS;  Surgeon: Joseph Longo MD;  Location: [facility] OR;  Service: General;  Laterality: Left;    INSERTION OF TUNNELED CENTRAL VENOUS CATHETER (CVC) WITH SUBCUTANEOUS PORT N/A 2021    Procedure: [id number];  Surgeon: Christoph Douglas MD;  Location: [facility] OR;  Service: General;  Laterality: N/A;    REMOVAL OF VASCULAR ACCESS PORT      SENTINEL LYMPH NODE BIOPSY Left 10/12/2021    Procedure: BIOPSY, LYMPH NODE, SENTINEL;  Surgeon: Christoph Douglas MD;  Location: [facility] OR;  Service: General;  Laterality: Left;    TOE SURGERY       Family History   Problem Relation Age of Onset    Cancer Mother 38        breast    Breast cancer Mother     Heart disease Maternal Grandmother     Cataracts Cousin     Hypertension Son     Diabetes Neg Hx     Stroke Neg Hx     Kidney disease Neg Hx     Asthma Neg Hx     COPD Neg Hx     Melanoma Neg Hx     Hyperlipidemia Neg Hx      Social History     Socioeconomic History    Marital status: Single    Number of children: 2    Highest education level: 11th grade   Occupational History    Occupation: Retired   Tobacco Use    Smoking status: Never Smoker    Smokeless tobacco: Never Used   Substance and Sexual Activity    Alcohol use: Never     Alcohol/week: 0.0 standard drinks    Drug use: No    Sexual activity: Not Currently     Partners: Male     Birth control/protection: See Surgical Hx   Other Topics Concern    Are you pregnant or think you may be? No    Breast-feeding No   Social History Narrative    Single. 2 children , 1  at 30 yoa  2014 strep throat -  pneumonia and renal complications after not completing course of AB. Other child lives in [place], [place]. Has a cousin locally that could help in an emergency. 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 Caffeine intake =- 1 cola a day. No coffee, + occasional tea, avoids caffeine especially at night. Still drives.  She does not have a Living Will or Advanced directive.      Patient Active Problem List   Diagnosis    Heart transplanted    Anxiety    Insomnia    CKD (chronic kidney disease) stage 4, GFR 15-29 ml/min    Immunosuppression due to drug therapy    Fibromyalgia    Gastroesophageal reflux disease without esophagitis    Breast screening    Immunization deficiency    Essential hypertension    Aortic atherosclerosis    Chronic major depressive disorder, recurrent episode    Iron deficiency anemia    Vaginal atrophy    Hyperparathyroidism    Hx of falling    Chronic midline low back pain without sciatica    Closed nondisplaced fracture of distal phalanx of left great toe with routine healing    Lightheadedness    Medication side effects    Obesity (BMI 30.0-34.9)    Edema    Asymptomatic menopausal state     Other osteoporosis without current pathological fracture    Cough    Abnormal CT scan, kidney    Weakness of both lower extremities    Immunocompromised patient    Osteoporosis, post-menopausal    Ductal carcinoma in situ (DCIS) of left breast    Immunodeficiency secondary to radiation therapy    Abnormal mammogram    The hangs, uncomplicated    Severe sepsis    GRACE (acute kidney injury)    Diarrhea    Thrombocytopenia, unspecified    Immunodeficiency due to chemotherapy    C. difficile colitis    Strain of left shoulder    Left shoulder pain    Ulnar neuropathy of left upper extremity    Anemia associated with stage 4 chronic renal failure    Secondary and unspecified malignant neoplasm of axilla and upper limb lymph nodes    Other chest pain    Cystitis    Abnormal thyroid function test    Hypomagnesemia    Slow transit constipation    Other hyperlipidemia    Sacroiliac joint pain     Review of patient's allergies indicates:   Allergen Reactions    Lisinopril Swelling and Rash    Augmentin [amoxicillin-pot clavulanate] Diarrhea       Medications:  Current  Outpatient Medications on File Prior to Visit   Medication Sig Dispense Refill    acetaminophen (TYLENOL) 325 MG tablet Take 1 tablet (325 mg total) by mouth every 6 (six) hours as needed for Pain.  0    amLODIPine (NORVASC) 2.5 MG tablet       aspirin (ECOTRIN) 81 MG EC tablet Take 1 tablet (81 mg total) by mouth once daily. 90 tablet 3    biotin 10,000 mcg Cap Take 1 tablet by mouth once daily.      busPIRone (BUSPAR) 10 MG tablet Take 1 tablet (10 mg total) by mouth once daily. 90 tablet 3    calcitonin, salmon, (FORTICAL) 200 unit/actuation nasal spray 1 spray by Nasal route once daily. 3.7 mL 7    carvediloL (COREG) 6.25 MG tablet Take 1 tablet (6.25 mg total) by mouth 2 (two) times daily with meals. 180 tablet 3    cycloSPORINE modified, NEORAL, (NEORAL) 25 MG capsule TAKE 3 CAPSULES (75 MG TOTAL) BY MOUTH 2 (TWO) TIMES DAILY. 540 capsule 6    DULoxetine (CYMBALTA) 30 MG capsule TAKE 1 CAPSULE EVERY DAY 90 capsule 1    EVENING PRIMROSE OIL ORAL Take 1,000 mg by mouth once daily.      furosemide (LASIX) 20 MG tablet Take 1 tablet (20 mg total) by mouth 2 (two) times a day for 5 days, THEN 1 tablet (20 mg total) once daily. Take 1 tablet daily. 370 tablet 0    hydrALAZINE (APRESOLINE) 50 MG tablet Take 1 tablet (50 mg total) by mouth every 8 (eight) hours. TAKE 1 TABLETS  EVERY 8  HOURS. 270 tablet 3    losartan (COZAAR) 25 MG tablet Take 1 tablet (25 mg total) by mouth once daily. 90 tablet 3    multivitamin capsule Take 1 capsule by mouth once daily.      ondansetron (ZOFRAN-ODT) 8 MG TbDL Take 1 tablet (8 mg total) by mouth every 8 (eight) hours as needed (nausea). 60 tablet 5    pantoprazole (PROTONIX) 40 MG tablet TAKE 1 TABLET EVERY DAY 90 tablet 1    pravastatin (PRAVACHOL) 20 MG tablet Take 1 tablet (20 mg total) by mouth once daily. 90 tablet 3    pregabalin (LYRICA) 50 MG capsule TAKE 1 CAPSULE 2 TIMES DAILY AT NOON AND NIGHT TIME 180 capsule 1    temazepam (RESTORIL) 30 mg capsule  Take 1 at bedtime 90 capsule 1    vitamin E 400 UNIT capsule Take 400 Units by mouth once daily.      [DISCONTINUED] zolpidem (AMBIEN) 10 mg Tab TAKE 1 TABLET BY MOUTH ONCE DAILY IN THE EVENING 90 tablet 0    RETACRIT 20,000 unit/mL injection        Current Facility-Administered Medications on File Prior to Visit   Medication Dose Route Frequency Provider Last Rate Last Admin    denosumab (PROLIA) injection 60 mg  60 mg Subcutaneous Q6 Months Prasanth Johnson MD        lactated ringers infusion   Intravenous Continuous Jennifer Carr MD 10 mL/hr at 11/09/21 0717 Restarted at 11/09/21 0820       Medications have been reviewed and reconciled with patient at visit today.    Barriers to medications present (no )    Adverse reactions to current medications (no)      Exam:  Vitals:    07/13/22 1323   BP: 112/64   Pulse: 86   Temp: 97.4 °F (36.3 °C)     Weight: 75.2 kg (165 lb 12.6 oz)   Body mass index is 30.32 kg/m².      BP Readings from Last 3 Encounters:   07/13/22 112/64   06/21/22 124/76   06/21/22 124/70     Wt Readings from Last 3 Encounters:   07/13/22 1323 75.2 kg (165 lb 12.6 oz)   06/21/22 1128 73.9 kg (163 lb)   06/21/22 1128 75.4 kg (166 lb 3.6 oz)            Physical Exam  Constitutional:       General: She is not in acute distress.  HENT:      Nose: Nose normal.      Mouth/Throat:      Mouth: Mucous membranes are moist.      Pharynx: No oropharyngeal exudate or posterior oropharyngeal erythema.   Eyes:      General: No scleral icterus.  Cardiovascular:      Rate and Rhythm: Normal rate and regular rhythm.      Heart sounds: Murmur heard.   Pulmonary:      Effort: No respiratory distress.      Breath sounds: Normal breath sounds.   Abdominal:      General: Bowel sounds are normal.      Palpations: Abdomen is soft.      Tenderness: There is no abdominal tenderness.   Musculoskeletal:         General: No swelling.      Cervical back: Neck supple.      Comments: Pain reproduced right SI and right sciatic  notch.    Lymphadenopathy:      Cervical: No cervical adenopathy.   Skin:     General: Skin is warm and dry.   Neurological:      Mental Status: She is alert. Mental status is at baseline.   Psychiatric:         Mood and Affect: Mood normal.         Thought Content: Thought content normal.         Laboratory Reviewed: (Yes)  Lab Results   Component Value Date    WBC 3.88 (L) 06/14/2022    HGB 9.4 (L) 06/14/2022    HCT 30.0 (L) 06/14/2022     06/14/2022    CHOL 182 06/14/2022    TRIG 184 (H) 06/14/2022    HDL 44 06/14/2022    ALT 29 06/14/2022    AST 42 (H) 06/14/2022     06/14/2022    K 4.3 06/14/2022     06/14/2022    CREATININE 2.6 (H) 06/14/2022    BUN 45 (H) 06/14/2022    CO2 23 06/14/2022    TSH 5.231 (H) 06/14/2022    PSA <0.1 05/27/2008    INR 1.0 11/22/2021    HGBA1C 5.2 12/24/2021           Health Maintenance  Health Maintenance Topics with due status: Not Due       Topic Last Completion Date    Pneumococcal Vaccines (Age 65+) 10/22/2018    TETANUS VACCINE 09/09/2020    Colorectal Cancer Screening 02/25/2021    DEXA Scan 08/03/2021    Influenza Vaccine 10/07/2021    Mammogram 05/16/2022    Lipid Panel 06/14/2022     Health Maintenance Due   Topic Date Due    COVID-19 Vaccine (3 - Moderna risk series) 11/25/2021       Assessment:  Problem List Items Addressed This Visit        Orthopedic    Sacroiliac joint pain     X-ray.  CT reviewed. Suspect musculoskeletal.   PT referral.               Other    Insomnia     Not interested in decreasing Ambien/restoril dose - discussed risks/benefits.            Relevant Medications    zolpidem (AMBIEN) 10 mg Tab      Other Visit Diagnoses     SI (sacroiliac) pain    -  Primary    Relevant Orders    X-Ray Lumbar Spine 5 View    Ambulatory referral/consult to Physical/Occupational Therapy    Allergic rhinitis, unspecified seasonality, unspecified trigger        Relevant Medications    azelastine (ASTELIN) 137 mcg (0.1 %) nasal spray            Plan:  SI  (sacroiliac) pain  -     X-Ray Lumbar Spine 5 View; Future; Expected date: 07/13/2022  -     Ambulatory referral/consult to Physical/Occupational Therapy; Future; Expected date: 07/14/2022    Primary insomnia  -     zolpidem (AMBIEN) 10 mg Tab; TAKE 1 TABLET BY MOUTH ONCE DAILY IN THE EVENING  Dispense: 90 tablet; Refill: 1    Allergic rhinitis, unspecified seasonality, unspecified trigger  -     azelastine (ASTELIN) 137 mcg (0.1 %) nasal spray; 2 sprays (274 mcg total) by Nasal route 2 (two) times daily.  Dispense: 30 mL; Refill: 6    Sacroiliac joint pain      -Patient's lab results were reviewed and discussed with patient  -Treatment options and alternatives were discussed with the patient. Patient expressed understanding. Patient was given the opportunity to ask questions and be an active participant in their medical care. Patient had no further questions or concerns at this time.   -Documentation of patient's health and condition was obtained from family member who was present during visit.  -Patient is an overall moderate risk for health complications from their medical conditions.       Follow up: Follow up in about 3 months (around 10/13/2022).      After visit summary printed and given to patient upon discharge.  Patient goals and care plan are included in After visit summary.    Total medical decision making time was 43 min.  The following issues were discussed: The primary encounter diagnosis was SI (sacroiliac) pain. Diagnoses of Primary insomnia, Allergic rhinitis, unspecified seasonality, unspecified trigger, and Sacroiliac joint pain were also pertinent to this visit.    Health maintenance needs, recent test results and goals of care discussed with pt and questions answered.

## 2022-07-14 ENCOUNTER — TELEPHONE (OUTPATIENT)
Dept: PAIN MEDICINE | Facility: CLINIC | Age: 67
End: 2022-07-14
Payer: MEDICARE

## 2022-07-14 ENCOUNTER — INFUSION (OUTPATIENT)
Dept: INFUSION THERAPY | Facility: HOSPITAL | Age: 67
End: 2022-07-14
Attending: INTERNAL MEDICINE
Payer: MEDICARE

## 2022-07-14 VITALS
HEART RATE: 99 BPM | OXYGEN SATURATION: 97 % | TEMPERATURE: 98 F | HEIGHT: 62 IN | BODY MASS INDEX: 30.51 KG/M2 | RESPIRATION RATE: 18 BRPM | DIASTOLIC BLOOD PRESSURE: 69 MMHG | WEIGHT: 165.81 LBS | SYSTOLIC BLOOD PRESSURE: 108 MMHG

## 2022-07-14 DIAGNOSIS — N18.4 ANEMIA ASSOCIATED WITH STAGE 4 CHRONIC RENAL FAILURE: Primary | ICD-10-CM

## 2022-07-14 DIAGNOSIS — D63.1 ANEMIA ASSOCIATED WITH STAGE 4 CHRONIC RENAL FAILURE: Primary | ICD-10-CM

## 2022-07-14 DIAGNOSIS — M51.34 DDD (DEGENERATIVE DISC DISEASE), THORACIC: Primary | ICD-10-CM

## 2022-07-14 DIAGNOSIS — M81.8 OTHER OSTEOPOROSIS WITHOUT CURRENT PATHOLOGICAL FRACTURE: ICD-10-CM

## 2022-07-14 PROCEDURE — 96372 THER/PROPH/DIAG INJ SC/IM: CPT

## 2022-07-14 PROCEDURE — 63600175 PHARM REV CODE 636 W HCPCS: Mod: JG

## 2022-07-14 RX ADMIN — ERYTHROPOIETIN 20000 UNITS: 20000 INJECTION, SOLUTION INTRAVENOUS; SUBCUTANEOUS at 12:07

## 2022-07-14 NOTE — NURSING
1204: Retacrit Injection given without difficulties.Bandaid applied. Patient instructed to stay in the clinic for 15 minutes. Patient verbalized understanding and will notify nurse with any complaints.

## 2022-07-14 NOTE — DISCHARGE INSTRUCTIONS
[facility]  [address] [address]  [address] [address]  [phone] phone     [phone] fax  Hours of Operation: Monday- Friday 8:00am- 5:00pm  After hours phone  [phone]  Hematology / Oncology Physicians on call      SALENA Sark[address] Dr., ONELIA Delgado, ONELIA Marina, RUDY Noyola    Please call with any concerns regarding your appointment today. FALL PREVENTION   Falls often occur due to slipping, tripping or losing your balance. Here are ways to reduce your risk of falling again.   Was there anything that caused your fall that can be fixed, removed or replaced?   Make your home safe by keeping walkways clear of objects you may trip over.   Use non-slip pads under rugs.   Do not walk in poorly lit areas.   Do not stand on chairs or wobbly ladders.   Use caution when reaching overhead or looking upward. This position can cause a loss of balance.   Be sure your shoes fit properly, have non-slip bottoms and are in good condition.   Be cautious when going up and down stairs, curbs, and when walking on uneven sidewalks.   If your balance is poor, consider using a cane or walker.   If your fall was related to alcohol use, stop or limit alcohol intake.   If your fall was related to use of sleeping medicines, talk to your doctor about this. You may need to reduce your dosage at bedtime if you awaken during the night to go to the bathroom.   To reduce the need for nighttime bathroom trips:   Avoid drinking fluids for several hours before going to bed   Empty your bladder before going to bed   Men can keep a urinal at the bedside   © 8548-0823 [facility] [facility], [address] [place] [address], [place] [address]. All rights reserved. This information is not intended as a substitute for professional medical care. Always follow your healthcare professional's instructions.

## 2022-07-19 ENCOUNTER — CLINICAL SUPPORT (OUTPATIENT)
Dept: REHABILITATION | Facility: HOSPITAL | Age: 67
End: 2022-07-19
Payer: MEDICARE

## 2022-07-19 DIAGNOSIS — M53.3 SI (SACROILIAC) PAIN: ICD-10-CM

## 2022-07-19 PROCEDURE — 97161 PT EVAL LOW COMPLEX 20 MIN: CPT

## 2022-07-20 ENCOUNTER — TELEPHONE (OUTPATIENT)
Dept: INTERNAL MEDICINE | Facility: CLINIC | Age: 67
End: 2022-07-20
Payer: MEDICARE

## 2022-07-20 NOTE — TELEPHONE ENCOUNTER
----- Message from Margi Pierson sent at 7/20/2022  3:51 PM CDT -----  .Type:  Test Results    Who Called: Pt   Name of Test (Lab/Mammo/Etc): X-ray  Date of Test: 07/13   Ordering Provider: karen   Where the test was performed: [facility]   Would the patient rather a call back or a response via [facility]? Call back   Best Call Back Number: [phone]    Additional Information:      Thanks bs

## 2022-07-20 NOTE — TELEPHONE ENCOUNTER
X-ray shows severe degenerative disc disease lower thoracic spine. This correlates to the site where you have pain that radiates around to your side. Lets try PT. Will also refer you to pain mgmt.     Plz schedule pain mgmt appt.    Spoke with pt voiced understanding of results.   States she is scheduled for therapy twice a week.   Pain management already scheduled.

## 2022-07-21 NOTE — PLAN OF CARE
[facility]   Physical Therapy Initial Evaluation     Date: 7/19/2022   Name: Nadia Damon  Clinic Number: [id number]    Therapy Diagnosis:   Encounter Diagnosis   Name Primary?    SI (sacroiliac) pain      Physician: Luz Dickson NP    Physician Orders: PT Eval and Treat   Medical Diagnosis from Referral: SI pain  Evaluation Date: 7/19/2022  Authorization Period Expiration: 7/13/2023  Plan of Care Expiration: 10/17/2022  Progress Note Due: 10 visits or 8/18/2022  Visit # / Visits authorized: 1/1     FOTO  Number Date Score    1 7/19/2022 46%   2     3     4         Precautions: cancer    Time In: 2:20  Time Out: 3:05  Total Appointment Time (timed & untimed codes): 40 minutes      SUBJECTIVE   Date of onset: 7/13/2022    History of current condition - Nadia reports: lateral right hip and buttocks.  She has a lift chair at home    Imaging: See Epic    Prior Therapy: for leg weakness  Occupation: none  Prior Level of Function: Impaired gait with pain  Current Level of Function: impaired mobility with pain    Pain:  Current 6/10, worst 9/10, best 0/10   Location: right hip and buttocks  Description: Aching, Burning and Shooting  Aggravating Factors: Bending,squatting, tying shoes, and getting up  Easing Factors: Sitting in one still position    Patients goals: decrease pain, increase range of motion, increase strength, return to prior level of function     Medical History:   Past Medical History:   Diagnosis Date    Abdominal wall hernia     CT Renal 6/11/2018---Small fat containing superior ventral abdominal wall hernia at the epicardial pacing lead site.    Anxiety     Arthritis     ZEN HIPS    Breast cancer in female 08/2021    LEFT BREAST    Cellulitis of axilla, left 12/23/2021    Chronic diastolic heart failure 12/16/2021    Chronic kidney disease     stage 4, GFR 15-29 ml/min    Chronic midline low back pain without sciatica 06/18/2018    Coronary artery disease  1993    heart transplant    Depression     Fibromyalgia     on Lyrica    Heart failure     native heart cardiomyopathy    Heart transplanted 1993    due to cardiomyopathy    History of hyperparathyroidism; Hyperparathyroidism, secondary renal     PT DENIES    Hypertension     Immune disorder     anti rejection meds    Iron deficiency anemia 08/15/2017    Kidney stones     passed per pt    Obesity     Other osteoporosis without current pathological fracture 08/30/2019    Shingles 2003 approx    left leg    Trouble in sleeping     Urinary incontinence        Surgical History:   Nadia Damon  has a past surgical history that includes Cardiac pacemaker removal (Left, 06/26/2014); Bladder surgery (2015 approx); Carpal tunnel release (Left, 03/03/2015); Hysterectomy (1983); Hernia repair (Right, 1971 approx); Breast surgery (Left, 09/28/2015); Breast surgery (Right, 12/2015); Toe Surgery; Colonoscopy (N/A, 02/25/2021); Breast biopsy (Bilateral); Heart transplant (1993); Clark lymph node biopsy (Left, 10/12/2021); Insertion of tunneled central venous catheter (CVC) with subcutaneous port (N/A, 11/09/2021); Incision and drainage of abscess (Left, 12/24/2021); Removal of vascular access port; and Breast lumpectomy (Left, 2021).    Medications:   Nadia has a current medication list which includes the following prescription(s): acetaminophen, amlodipine, aspirin, azelastine, biotin, buspirone, calcitonin (salmon), carvedilol, cyclosporine modified (neoral), duloxetine, evening primrose oil, furosemide, hydralazine, losartan, multivitamin, ondansetron, pantoprazole, pravastatin, pregabalin, retacrit, temazepam, vitamin e, and zolpidem, and the following Facility-Administered Medications: denosumab and lactated ringers.    Allergies:   Review of patient's allergies indicates:   Allergen Reactions    Lisinopril Swelling and Rash    Augmentin [amoxicillin-pot clavulanate] Diarrhea          OBJECTIVE      Sensation:  Sensation is intact to light touch    Structural Inspection:     ROM   %    Lumbar Forward Bending To knees ---   Lumbar Backward Bending  40 ---    Right (degrees) Left (degrees)   Hip Flexion 100 110   Hip Extension  0 0     Strength   Right    Left   Gluteus Mike 3+/5 3+/5   Gluteus Medius 3+/5 3+/5   Hip Adductors 4/5 4/5   Hip flexors 3-/5 3/5   Quadriceps 4-/5 4-/5   Hamstrings 4-/5 4-/5       Palpation: Very tender in right lumbar and hip area    Gait Analysis: The patient ambulated without device in a flexed hip position with decreased speed and reported pain             PATIENT EDUCATION AND HOME EXERCISES     Education provided:   - Transfer cues to improve function with long roll      ASSESSMENT     Nadia is a 67 y.o. female referred to outpatient Physical Therapy with a medical diagnosis of SI pain. Patient presents with :  1. Back pain  2. Right hip pain  3. Decreased range of motion right lower extremity  4. Decreased core and lower extremity strength    Patient prognosis is Good.   Patientt will benefit from skilled outpatient Physical Therapy to address the deficits stated above and in the chart below, provide patient /family education, and to maximize patientt's level of independence.     Plan of care discussed with patient: Yes  Patient's spiritual, cultural and educational needs considered and patient is agreeable to the plan of care and goals as stated below:     Anticipated Barriers for therapy: none    Medical Necessity is demonstrated by the following  History  Co-morbidities and personal factors that may impact the plan of care Co-morbidities:   See above    Personal Factors:   no deficits     low   Examination  Body Structures and Functions, activity limitations and participation restrictions that may impact the plan of care Body Regions:   back  lower extremities    Body Systems:    ROM  strength  gait    Participation Restrictions:   none    Activity limitations:    Learning and applying knowledge  no deficits    General Tasks and Commands  no deficits    Communication  no deficits    Mobility  lifting and carrying objects  walking    Self care  washing oneself (bathing, drying, washing hands)  dressing  looking after one's health    Domestic Life  shopping  cooking  assisting others    Interactions/Relationships  no deficits    Life Areas  no deficits    Community and Social Life  community life  recreation and leisure         low   Clinical Presentation stable and uncomplicated low   Decision Making/ Complexity Score: low     Goals:  Short Term Goals: In 4 weeks   1. I with HEP  2. Pt to increase lumbar ROM from knees to mid shin    3. Pt to have pain less than 6/10 at all times.  4. Pt will improve FOTO disability score to 40% disability or less in order to improve overall QOL and return to PLOF.      Long Term Goals: In 8 weeks  1.  Pt will improve FOTO disability score to 35% disability or less in order to improve overall QOL and return to PLOF.    2.  Patient to demo increase in LE strength to 4/5  3.  Patient to have decreased pain to 3/10 at all times.  4.  Patient to demo increase lumbar ROM to 90% flexion  5.  Patient to perform daily activities including walking without limitation.    PLAN   Plan of care Certification: 7/19/2022 to 10/17/2022.    Outpatient Physical Therapy 2 times weekly for 8 weeks to include the following interventions: Electrical Stimulation as needed, Gait Training, Manual Therapy, Moist Heat/ Ice, Neuromuscular Re-ed, Patient Education, Therapeutic Activities, Therapeutic Exercise and dry needling.     Omar Perez, PT      I CERTIFY THE NEED FOR THESE SERVICES FURNISHED UNDER THIS PLAN OF TREATMENT AND WHILE UNDER MY CARE   Physician's comments:     Physician's Signature: ___________________________________________________

## 2022-07-26 ENCOUNTER — CLINICAL SUPPORT (OUTPATIENT)
Dept: REHABILITATION | Facility: HOSPITAL | Age: 67
End: 2022-07-26
Payer: MEDICARE

## 2022-07-26 DIAGNOSIS — Z74.09 IMPAIRED MOBILITY: Primary | ICD-10-CM

## 2022-07-26 PROCEDURE — 97110 THERAPEUTIC EXERCISES: CPT

## 2022-07-26 NOTE — PROGRESS NOTES
[facility]   Physical Therapy Treatment Note     Name: Nadia Damon  Clinic Number: [id number]    Therapy Diagnosis: No diagnosis found.  Physician: Luz Dickson NP    Visit Date: 7/26/2022    Physician Orders: PT Eval and Treat   Medical Diagnosis from Referral: SI pain  Evaluation Date: 7/19/2022  Authorization Period Expiration: 7/13/2023  Plan of Care Expiration: 10/17/2022  Progress Note Due: 10 visits or 8/18/2022  Visit # / Visits authorized: 1/1; 1/20     FOTO  Number Date Score    1 7/19/2022 46%   2       3       4             Precautions: cancer      PTA Visit #: ---/5     Time In: 9:30  Time Out: 10:10  Total Billable Time: 40 minutes    SUBJECTIVE     Pt reports: Right hip and back pain.  She was compliant with home exercise program.  Response to previous treatment: N/A  Functional change: N/A    Pain: 6/10  Location: right hip and buttocks      OBJECTIVE     Objective Measures updated at progress report unless specified.     Treatment     Nadia received the treatments listed below:      therapeutic exercises to develop strength, flexibility and core stabilization for 40 minutes including:  Nustep 5 min  Heel slides 2x10  Bridges 2x10  Lower trunk rotations 2x10  Posterior pelvic tilts 2x10  Piriformis stretch    Patient Education and Home Exercises     Home Exercises Provided and Patient Education Provided     Education provided:   - Home program    Written Home Exercises Provided: Patient instructed to cont prior HEP. Exercises were reviewed and Nadia was able to demonstrate them prior to the end of the session.  Nadia demonstrated good  understanding of the education provided. See EMR under Patient Instructions for exercises provided during therapy sessions    ASSESSMENT     Nadia is a 67 y.o. female referred to outpatient Physical Therapy with a medical diagnosis of SI pain. Patient presents with :  1. Back pain  2. Right hip pain  3. Decreased range of motion  right lower extremity  4. Decreased core and lower extremity strength    The patient was instructed in and performed core and lower extremity strengthening and stretches     Nadia Is progressing well towards her goals.   Pt prognosis is Good.     Pt will continue to benefit from skilled outpatient physical therapy to address the deficits listed in the problem list box on initial evaluation, provide pt/family education and to maximize pt's level of independence in the home and community environment.     Pt's spiritual, cultural and educational needs considered and pt agreeable to plan of care and goals.     Anticipated barriers to physical therapy: none    Goals: Short Term Goals: In 4 weeks   1. I with HEP  2. Pt to increase lumbar ROM from knees to mid shin    3. Pt to have pain less than 6/10 at all times.  4. Pt will improve FOTO disability score to 40% disability or less in order to improve overall QOL and return to PLOF.       Long Term Goals: In 8 weeks  1.  Pt will improve FOTO disability score to 35% disability or less in order to improve overall QOL and return to PLOF.    2.  Patient to demo increase in LE strength to 4/5  3.  Patient to have decreased pain to 3/10 at all times.  4.  Patient to demo increase lumbar ROM to 90% flexion  5.  Patient to perform daily activities including walking without limitation.    PLAN     Plan of care Certification: 7/19/2022 to 10/17/2022.     Outpatient Physical Therapy 2 times weekly for 8 weeks to include the following interventions: Electrical Stimulation as needed, Gait Training, Manual Therapy, Moist Heat/ Ice, Neuromuscular Re-ed, Patient Education, Therapeutic Activities, Therapeutic Exercise and dry needling.     Omar Perez, PT

## 2022-07-28 ENCOUNTER — CLINICAL SUPPORT (OUTPATIENT)
Dept: REHABILITATION | Facility: HOSPITAL | Age: 67
End: 2022-07-28
Payer: MEDICARE

## 2022-07-28 DIAGNOSIS — Z74.09 IMPAIRED MOBILITY: ICD-10-CM

## 2022-07-28 PROCEDURE — 97010 HOT OR COLD PACKS THERAPY: CPT | Performed by: GENERAL ACUTE CARE HOSPITAL

## 2022-07-28 PROCEDURE — 97110 THERAPEUTIC EXERCISES: CPT | Performed by: GENERAL ACUTE CARE HOSPITAL

## 2022-07-28 NOTE — PROGRESS NOTES
[facility]   Physical Therapy Treatment Note   Name: Nadia Damon  Clinic Number: [id number]  Therapy Diagnosis:   Encounter Diagnosis   Name Primary?    Impaired mobility      Physician: Luz Dickson NP    Physician Orders: PT Eval and Treat   Medical Diagnosis from Referral: SI pain  Evaluation Date: 7/19/2022  Authorization Period Expiration: 7/13/2023  Plan of Care Expiration: 10/17/2022  Progress Note Due: 10 visits or 8/18/2022  Visit # / Visits authorized: 2/20 Treatments  (1/1 Eval)     FOTO  Number Date Score    1 7/19/2022 46%   2       3       4           Visit Date: 7/28/2022  Time In: 1100  Time Out: 1145  Total Billable Time:  45 minutes  PTA Visit #: 0/5     SUBJECTIVE   Pt returns to OP PT reporting: Feeling a little better after dry needling from first session. Still continues to display impaired mobility and painful movement patterns, specifically when rolling or turning.   Pain: 4/10  Location: right Hip/SIJ    Response to previous treatment: decreased pain following dry needling  Functional change: improved subjective pain report  She was compliant with home exercise program.  OBJECTIVE     Objective Measures updated at progress report unless specified.     Treatment   Nadia received following skilled interventions listed below:    PT Intervention Parameters Time   Therapeutic Exercise to develop strength, endurance, ROM, flexibility, posture and core stabilization  PPT x30 hooklying   Supine marches from ball 3x5 ea   Quadruped arm lifts 3x5 ea   Quadruped leg lifts 3x5 ea    Seated flexion with ball rolls x20 edge of mat   CORNELIO x10 at counter  34 minutes   Heat/Cold Modalities to impact pain or  [x]Heat-Location: Lumbar R hip   []Cold-Location: 10 minutes     Patient Education and Home Exercises   Home Exercises Provided and Patient Education Provided    Patient was educated on the role of PT, POC, treatment plan, discharge goals, HEP.   Patient  educated on biomechanical justification for therapeutic exercise and importance of compliance with HEP in order to improve overall impairments and QOL    Patient was educated on all the above exercise prior/during/after for proper posture, positioning, and execution for safe performance with home exercise program.       Written Home Exercises Provided:    Patient instructed to cont prior HEP.    Exercises were reviewed and Nadia was able to demonstrate them prior to the end of the session.     Nadia demonstrated good  understanding of the education provided.    See EMR under Patient Instructions for exercises provided during therapy sessions    ASSESSMENT     Patient is able to participate in treatment session today with fair to moderate lumbosacral compensations displayed during transfers and exercise performance due to impact and impaired mobility. She has a +ASLR on the R side 5/5 difficulty noted as compared to 3/5 on the L side. Does not report improved tolerance with manual compression. Session today with emphasis on pelvic/lumbar mobility tasks and proper trunk muscualr recruitment with transfers and positioning.     Nadia Is progressing well towards her goals.   Pt prognosis is Good.      Pt will continue to benefit from skilled outpatient physical therapy to address the deficits listed in the problem list box on initial evaluation, provide pt/family education and to maximize pt's level of independence in the home and community environment.    Pt's spiritual, cultural and educational needs considered and pt agreeable to plan of care and goals.   Anticipated barriers to physical therapy: none at this time    Goals:  Short Term Goals: In 4 weeks   1. I with HEP  2. Pt to increase lumbar ROM from knees to mid shin    3. Pt to have pain less than 6/10 at all times.  4. Pt will improve FOTO disability score to 40% disability or less in order to improve overall QOL and return to PLOF.       Long Term  Goals: In 8 weeks  1.  Pt will improve FOTO disability score to 35% disability or less in order to improve overall QOL and return to PLOF.    2.  Patient to demo increase in LE strength to 4/5  3.  Patient to have decreased pain to 3/10 at all times.  4.  Patient to demo increase lumbar ROM to 90% flexion  5.  Patient to perform daily activities including walking without limitation.  PLAN     Plan to continue with current program. Will including dry needling as appropriate. Focus on education and movement pattern corrections.     Lenore Reyes PT, DPT, SCS, CSCS  Board Certified Sports Clinical Specialist   Certified Dry Needling Provider  7/28/2022  11:21 AM

## 2022-08-01 ENCOUNTER — CLINICAL SUPPORT (OUTPATIENT)
Dept: REHABILITATION | Facility: HOSPITAL | Age: 67
End: 2022-08-01
Payer: MEDICARE

## 2022-08-01 DIAGNOSIS — Z74.09 IMPAIRED MOBILITY: Primary | ICD-10-CM

## 2022-08-01 PROCEDURE — 97110 THERAPEUTIC EXERCISES: CPT

## 2022-08-02 NOTE — PROGRESS NOTES
[facility]   Physical Therapy Treatment Note     Name: Nadia Damon  Clinic Number: [id number]    Therapy Diagnosis:   Encounter Diagnosis   Name Primary?    Impaired mobility Yes     Physician: Luz Dickson NP    Visit Date: 8/1/2022    Physician Orders: PT Eval and Treat   Medical Diagnosis from Referral: SI pain  Evaluation Date: 7/19/2022  Authorization Period Expiration: 7/13/2023  Plan of Care Expiration: 10/17/2022  Progress Note Due: 10 visits or 8/18/2022  Visit # / Visits authorized: 1/1; 1/20     FOTO  Number Date Score    1 7/19/2022 46%   2       3       4             Precautions: cancer      PTA Visit #: ---/5     Time In:1:15  Time Out: 2:00  Total Billable Time: 40 minutes    SUBJECTIVE     Pt reports: Right hip and back pain.  She was compliant with home exercise program.  Response to previous treatment: N/A  Functional change: N/A    Pain: 6/10  Location: right hip and buttocks      OBJECTIVE     Objective Measures updated at progress report unless specified.     Treatment     Nadia received the treatments listed below:      therapeutic exercises to develop strength, flexibility and core stabilization for 40 minutes including:  Nustep 5 min  Wall squats with ball 2x10  Bridges 2x10  Lower trunk rotations 2x10  QL stretches  Single knee to chest  Posterior pelvic tilts 2x10  Piriformis stretch    Patient Education and Home Exercises     Home Exercises Provided and Patient Education Provided     Education provided:   - Home program    Written Home Exercises Provided: Patient instructed to cont prior HEP. Exercises were reviewed and Nadia was able to demonstrate them prior to the end of the session.  Nadia demonstrated good  understanding of the education provided. See EMR under Patient Instructions for exercises provided during therapy sessions    ASSESSMENT     Nadia is a 67 y.o. female referred to outpatient Physical Therapy with a medical diagnosis of SI pain.  She states she is doing better.  She has bilateral hip and lateral back pain which impairs movements supine<>sit<>stand.  She was instructed in and performed exercises and stretches to improve mobility with pain reduction,    Nadia Is progressing well towards her goals.   Pt prognosis is Good.     Pt will continue to benefit from skilled outpatient physical therapy to address the deficits listed in the problem list box on initial evaluation, provide pt/family education and to maximize pt's level of independence in the home and community environment.     Pt's spiritual, cultural and educational needs considered and pt agreeable to plan of care and goals.     Anticipated barriers to physical therapy: none    Goals: Short Term Goals: In 4 weeks   1. I with HEP  2. Pt to increase lumbar ROM from knees to mid shin    3. Pt to have pain less than 6/10 at all times.  4. Pt will improve FOTO disability score to 40% disability or less in order to improve overall QOL and return to PLOF.       Long Term Goals: In 8 weeks  1.  Pt will improve FOTO disability score to 35% disability or less in order to improve overall QOL and return to PLOF.    2.  Patient to demo increase in LE strength to 4/5  3.  Patient to have decreased pain to 3/10 at all times.  4.  Patient to demo increase lumbar ROM to 90% flexion  5.  Patient to perform daily activities including walking without limitation.    PLAN     Plan of care Certification: 7/19/2022 to 10/17/2022.     Outpatient Physical Therapy 2 times weekly for 8 weeks to include the following interventions: Electrical Stimulation as needed, Gait Training, Manual Therapy, Moist Heat/ Ice, Neuromuscular Re-ed, Patient Education, Therapeutic Activities, Therapeutic Exercise and dry needling.     Omar Perez, PT

## 2022-08-03 ENCOUNTER — CLINICAL SUPPORT (OUTPATIENT)
Dept: REHABILITATION | Facility: HOSPITAL | Age: 67
End: 2022-08-03
Payer: MEDICARE

## 2022-08-03 DIAGNOSIS — Z74.09 IMPAIRED MOBILITY: Primary | ICD-10-CM

## 2022-08-03 PROCEDURE — 97110 THERAPEUTIC EXERCISES: CPT

## 2022-08-03 NOTE — PROGRESS NOTES
Subjective:       Patient ID: Nadia Damon is a 67 y.o. female.    Chief Complaint: Anemia    Primary Oncologist/Hematologist: Dr. Collins     HPI: Ms. Damon is a 67 year old female who is following up for her anemia due to CKD. Epo has been initiated, last dose 22-20kU.  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 She was treated with 1 cycle of systemic chemotherapy cytoxan and taxotere and udenyca that was discontinued due to toxicity. She has seen surgery and healing well. She had radiation, completed 22.   Pmhx: heart transplant 26 yrs ago, on anti rejection medication. chronic back pain and disc degeneration    Today: patient is doing well. Only complaint is her back pain. She is getting Injecion on 22. She is also currently participating in PT. She states she has been fatigued. She denies any bleeding, pica, headaches, chest pain, fevers, weight loss, n/v/d/c.    Social History     Socioeconomic History    Marital status: Single    Number of children: 2    Highest education level: 11th grade   Occupational History    Occupation: Retired   Tobacco Use    Smoking status: Never Smoker    Smokeless tobacco: Never Used   Substance and Sexual Activity    Alcohol use: Never     Alcohol/week: 0.0 standard drinks    Drug use: No    Sexual activity: Not Currently     Partners: Male     Birth control/protection: See Surgical Hx   Other Topics Concern    Are you pregnant or think you may be? No    Breast-feeding No   Social History Narrative    Single. 2 children , 1  at 30 yoa   strep throat -  pneumonia and renal complications after not completing course of AB. Other child lives in [place], [place]. Has a cousin locally that could help in an emergency. 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 Caffeine intake =- 1 cola a day. No coffee, + occasional tea, avoids caffeine especially  at night. Still drives. She does not have a Living Will or Advanced directive.        Past Medical History:   Diagnosis Date    Abdominal wall hernia     CT Renal 6/11/2018---Small fat containing superior ventral abdominal wall hernia at the epicardial pacing lead site.    Anxiety     Arthritis     ZEN HIPS    Breast cancer in female 08/2021    LEFT BREAST    Cellulitis of axilla, left 12/23/2021    Chronic diastolic heart failure 12/16/2021    Chronic kidney disease     stage 4, GFR 15-29 ml/min    Chronic midline low back pain without sciatica 06/18/2018    Coronary artery disease 1993    heart transplant    Depression     Fibromyalgia     on Lyrica    Heart failure     native heart cardiomyopathy    Heart transplanted 1993    due to cardiomyopathy    History of hyperparathyroidism; Hyperparathyroidism, secondary renal     PT DENIES    Hypertension     Immune disorder     anti rejection meds    Iron deficiency anemia 08/15/2017    Kidney stones     passed per pt    Obesity     Other osteoporosis without current pathological fracture 08/30/2019    Shingles 2003 approx    left leg    Trouble in sleeping     Urinary incontinence        Family History   Problem Relation Age of Onset    Cancer Mother 38        breast    Breast cancer Mother     Heart disease Maternal Grandmother     Cataracts Cousin     Hypertension Son     Diabetes Neg Hx     Stroke Neg Hx     Kidney disease Neg Hx     Asthma Neg Hx     COPD Neg Hx     Melanoma Neg Hx     Hyperlipidemia Neg Hx        Past Surgical History:   Procedure Laterality Date    BLADDER SURGERY  2015 approx    mesh - Dr Everett then 2nd reconstructive sx Dr Onofre    BREAST BIOPSY Bilateral     NEGATIVE    BREAST LUMPECTOMY Left 2021    BREAST SURGERY Left 09/28/2015    Bx - benign    BREAST SURGERY Right 12/2015    Bx benign    CARDIAC PACEMAKER REMOVAL Left 06/26/2014    Pacer defirillator removed. Put in 1993 aat time of heart  transplant    CARPAL TUNNEL RELEASE Left 03/03/2015    Dr. Hall    COLONOSCOPY N/A 02/25/2021    Procedure: COLONOSCOPY;  Surgeon: Freida Ramirez MD;  Location: [facility] ENDO;  Service: Endoscopy;  Laterality: N/A;    HEART TRANSPLANT  1993    HERNIA REPAIR Right 1971 approx    Inguinal    HYSTERECTOMY  1983    vag hyst /LSO     INCISION AND DRAINAGE OF ABSCESS Left 12/24/2021    Procedure: INCISION AND DRAINAGE, ABSCESS;  Surgeon: Joseph Longo MD;  Location: [facility] OR;  Service: General;  Laterality: Left;    INSERTION OF TUNNELED CENTRAL VENOUS CATHETER (CVC) WITH SUBCUTANEOUS PORT N/A 11/09/2021    Procedure: [id number];  Surgeon: Christoph Douglas MD;  Location: [facility] OR;  Service: General;  Laterality: N/A;    REMOVAL OF VASCULAR ACCESS PORT      SENTINEL LYMPH NODE BIOPSY Left 10/12/2021    Procedure: BIOPSY, LYMPH NODE, SENTINEL;  Surgeon: Christoph Douglas MD;  Location: [facility] OR;  Service: General;  Laterality: Left;    TOE SURGERY         Review of Systems   Constitutional: Positive for fatigue. Negative for activity change, appetite change, chills, diaphoresis, fever and unexpected weight change.   HENT: Negative for congestion and nosebleeds.    Respiratory: Negative for cough and shortness of breath.    Cardiovascular: Negative for chest pain and leg swelling.   Gastrointestinal: Negative for abdominal pain, blood in stool, constipation, diarrhea, nausea and vomiting.   Genitourinary: Negative for hematuria.   Musculoskeletal: Positive for arthralgias, back pain and myalgias.   Skin: Negative for color change and pallor.   Neurological: Positive for numbness. Negative for dizziness, weakness, light-headedness and headaches.   Hematological: Does not bruise/bleed easily.         Medication List with Changes/Refills   Current Medications    ACETAMINOPHEN (TYLENOL) 325 MG TABLET    Take 1 tablet (325 mg total) by mouth every 6 (six) hours as needed for Pain.    AMLODIPINE (NORVASC) 2.5 MG  TABLET        ASPIRIN (ECOTRIN) 81 MG EC TABLET    Take 1 tablet (81 mg total) by mouth once daily.    AZELASTINE (ASTELIN) 137 MCG (0.1 %) NASAL SPRAY    2 sprays (274 mcg total) by Nasal route 2 (two) times daily.    BIOTIN 10,000 MCG CAP    Take 1 tablet by mouth once daily.    BUSPIRONE (BUSPAR) 10 MG TABLET    Take 1 tablet (10 mg total) by mouth once daily.    CALCITONIN, SALMON, (FORTICAL) 200 UNIT/ACTUATION NASAL SPRAY    1 spray by Nasal route once daily.    CARVEDILOL (COREG) 6.25 MG TABLET    Take 1 tablet (6.25 mg total) by mouth 2 (two) times daily with meals.    CYCLOSPORINE MODIFIED, NEORAL, (NEORAL) 25 MG CAPSULE    TAKE 3 CAPSULES (75 MG TOTAL) BY MOUTH 2 (TWO) TIMES DAILY.    DULOXETINE (CYMBALTA) 30 MG CAPSULE    TAKE 1 CAPSULE EVERY DAY    EVENING PRIMROSE OIL ORAL    Take 1,000 mg by mouth once daily.    FUROSEMIDE (LASIX) 20 MG TABLET    Take 1 tablet (20 mg total) by mouth 2 (two) times a day for 5 days, THEN 1 tablet (20 mg total) once daily. Take 1 tablet daily.    HYDRALAZINE (APRESOLINE) 50 MG TABLET    Take 1 tablet (50 mg total) by mouth every 8 (eight) hours. TAKE 1 TABLETS  EVERY 8  HOURS.    LOSARTAN (COZAAR) 25 MG TABLET    Take 1 tablet (25 mg total) by mouth once daily.    METHOCARBAMOL (ROBAXIN) 750 MG TAB    Take 1 tablet (750 mg total) by mouth 2 (two) times daily as needed (Pain).    MULTIVITAMIN CAPSULE    Take 1 capsule by mouth once daily.    ONDANSETRON (ZOFRAN-ODT) 8 MG TBDL    Take 1 tablet (8 mg total) by mouth every 8 (eight) hours as needed (nausea).    PANTOPRAZOLE (PROTONIX) 40 MG TABLET    TAKE 1 TABLET EVERY DAY    PRAVASTATIN (PRAVACHOL) 20 MG TABLET    Take 1 tablet (20 mg total) by mouth once daily.    PREGABALIN (LYRICA) 50 MG CAPSULE    TAKE 1 CAPSULE 2 TIMES DAILY AT NOON AND NIGHT TIME    RETACRIT 20,000 UNIT/ML INJECTION        TEMAZEPAM (RESTORIL) 30 MG CAPSULE    Take 1 at bedtime    VITAMIN E 400 UNIT CAPSULE    Take 400 Units by mouth once daily.     ZOLPIDEM (AMBIEN) 10 MG TAB    TAKE 1 TABLET BY MOUTH ONCE DAILY IN THE EVENING     Objective:     Vitals:    08/15/22 0959   BP: (!) 143/89   Pulse: 95   Temp: 97.6 °F (36.4 °C)       Physical Exam  Constitutional:       General: She is not in acute distress.     Appearance: She is not ill-appearing, toxic-appearing or diaphoretic.   HENT:      Head: Normocephalic and atraumatic.   Eyes:      Conjunctiva/sclera: Conjunctivae normal.   Cardiovascular:      Rate and Rhythm: Normal rate.      Pulses: Normal pulses.   Pulmonary:      Effort: Pulmonary effort is normal.   Abdominal:      General: Bowel sounds are normal.   Musculoskeletal:      Right lower leg: No edema.   Skin:     General: Skin is warm and dry.      Coloration: Skin is not jaundiced or pale.      Findings: No bruising, erythema, lesion or rash.   Neurological:      Mental Status: She is alert.      Gait: Gait normal.            Labs/Results:  Lab Results   Component Value Date    WBC 3.87 (L) 08/15/2022    RBC 3.48 (L) 08/15/2022    HGB 9.4 (L) 08/15/2022    HCT 31.1 (L) 08/15/2022    MCV 89 08/15/2022    MCH 27.0 08/15/2022    MCHC 30.2 (L) 08/15/2022    RDW 15.9 (H) 08/15/2022     08/15/2022    MPV 8.8 (L) 08/15/2022    GRAN 2.4 08/15/2022    GRAN 61.4 08/15/2022    LYMPH 0.7 (L) 08/15/2022    LYMPH 18.9 08/15/2022    MONO 0.5 08/15/2022    MONO 14.0 08/15/2022    EOS 0.2 08/15/2022    BASO 0.02 08/15/2022    EOSINOPHIL 4.7 08/15/2022    BASOPHIL 0.5 08/15/2022       Mammogram diag left 2/9/22  Impression:  Left  Cyst: Left breast 41 mm x 36 mm x 33 mm cyst at the 3 o'clock position. Assessment: 3 - Probably benign. Short Interval Follow-Up in 3 Months is recommended.   BI-RADS Category:   Overall: 3 - Probably Benign.    Assessment:     Problem List Items Addressed This Visit        Oncology    Iron deficiency anemia    Relevant Orders    CBC Auto Differential    CBC Auto Differential    Ductal carcinoma in situ (DCIS) of left breast -  Primary    Relevant Orders    CBC Auto Differential    CBC Auto Differential    Anemia associated with stage 4 chronic renal failure    Relevant Orders    CBC Auto Differential    CBC Auto Differential    Secondary and unspecified malignant neoplasm of axilla and upper limb lymph nodes       Orthopedic    Fibromyalgia    Chronic midline low back pain without sciatica        Plan:     Ductal carcinoma in situ (DCIS) of left breast  --continue follow up with radiation oncology  --mammogram and US of left breast done in 2/9/22  --following with Dr. Douglas- following up in 6 months 11/2022 with imaging (mammogram and u/s) and breast exam.      Iron deficiency anemia, unspecified iron deficiency anemia type  --iron: 59, sat: 22%, ferritin: 249- WNL-repeat labs today  --will call with lab results and need for intervention or not.    Anemia associated with stage 4 chronic renal failure  --hgb:9.4 , hmt:31.1  --epo today  --epo 20kU for hgb<10g/dL  --last epo 7/14/22  --kidney function consistent       Immunocompromised patient  -- on immunosuppressive medications  --continue to monitor  --following up with transplant      Pain  --referred to pain management  --continue to follow with pain management     Follow-Up: epo today. Iron labs, will message with results and need for intervention or not. 1 month with cbc prior for possible epo-will message with need. 2 months with cbc for possible epo.     Kelsie Burk PA-C  Hematology Oncology

## 2022-08-04 ENCOUNTER — TELEPHONE (OUTPATIENT)
Dept: PAIN MEDICINE | Facility: CLINIC | Age: 67
End: 2022-08-04
Payer: MEDICARE

## 2022-08-04 ENCOUNTER — TELEPHONE (OUTPATIENT)
Dept: PRIMARY CARE CLINIC | Facility: CLINIC | Age: 67
End: 2022-08-04
Payer: MEDICARE

## 2022-08-04 NOTE — TELEPHONE ENCOUNTER
----- Message from Ayanna Stein sent at 8/4/2022  3:13 PM CDT -----  Pt is requesting a call back in regards to seeing if she can get an appt schedule for tomorrow. Pt can be reached at [phone] (home)

## 2022-08-04 NOTE — TELEPHONE ENCOUNTER
Patient call returned. She stated that she wanted to schedule to be seen because we were moving to a new location. Patient was informed that she will still be able to see Luz as her PCP. Patient verbally understood the information

## 2022-08-05 ENCOUNTER — OFFICE VISIT (OUTPATIENT)
Dept: PAIN MEDICINE | Facility: CLINIC | Age: 67
End: 2022-08-05
Payer: MEDICARE

## 2022-08-05 VITALS
WEIGHT: 170 LBS | DIASTOLIC BLOOD PRESSURE: 82 MMHG | RESPIRATION RATE: 17 BRPM | HEIGHT: 62 IN | HEART RATE: 92 BPM | SYSTOLIC BLOOD PRESSURE: 131 MMHG | BODY MASS INDEX: 31.28 KG/M2

## 2022-08-05 DIAGNOSIS — M46.1 SACROILIITIS: Primary | ICD-10-CM

## 2022-08-05 DIAGNOSIS — M51.34 DDD (DEGENERATIVE DISC DISEASE), THORACIC: ICD-10-CM

## 2022-08-05 DIAGNOSIS — M54.16 LUMBAR RADICULOPATHY: ICD-10-CM

## 2022-08-05 DIAGNOSIS — Z94.1 HEART TRANSPLANTED: Chronic | ICD-10-CM

## 2022-08-05 DIAGNOSIS — M47.816 LUMBAR SPONDYLOSIS: ICD-10-CM

## 2022-08-05 DIAGNOSIS — M51.9 LUMBAR DISC DISEASE: ICD-10-CM

## 2022-08-05 PROCEDURE — 1157F ADVNC CARE PLAN IN RCRD: CPT | Mod: CPTII,S$GLB,, | Performed by: ANESTHESIOLOGY

## 2022-08-05 PROCEDURE — 1101F PT FALLS ASSESS-DOCD LE1/YR: CPT | Mod: CPTII,S$GLB,, | Performed by: ANESTHESIOLOGY

## 2022-08-05 PROCEDURE — 3079F DIAST BP 80-89 MM HG: CPT | Mod: CPTII,S$GLB,, | Performed by: ANESTHESIOLOGY

## 2022-08-05 PROCEDURE — 4010F PR ACE/ARB THEARPY RXD/TAKEN: ICD-10-PCS | Mod: CPTII,S$GLB,, | Performed by: ANESTHESIOLOGY

## 2022-08-05 PROCEDURE — 3075F PR MOST RECENT SYSTOLIC BLOOD PRESS GE 130-139MM HG: ICD-10-PCS | Mod: CPTII,S$GLB,, | Performed by: ANESTHESIOLOGY

## 2022-08-05 PROCEDURE — 3066F NEPHROPATHY DOC TX: CPT | Mod: CPTII,S$GLB,, | Performed by: ANESTHESIOLOGY

## 2022-08-05 PROCEDURE — 1125F PR PAIN SEVERITY QUANTIFIED, PAIN PRESENT: ICD-10-PCS | Mod: CPTII,S$GLB,, | Performed by: ANESTHESIOLOGY

## 2022-08-05 PROCEDURE — 1125F AMNT PAIN NOTED PAIN PRSNT: CPT | Mod: CPTII,S$GLB,, | Performed by: ANESTHESIOLOGY

## 2022-08-05 PROCEDURE — 3288F FALL RISK ASSESSMENT DOCD: CPT | Mod: CPTII,S$GLB,, | Performed by: ANESTHESIOLOGY

## 2022-08-05 PROCEDURE — 1159F MED LIST DOCD IN RCRD: CPT | Mod: CPTII,S$GLB,, | Performed by: ANESTHESIOLOGY

## 2022-08-05 PROCEDURE — 3008F BODY MASS INDEX DOCD: CPT | Mod: CPTII,S$GLB,, | Performed by: ANESTHESIOLOGY

## 2022-08-05 PROCEDURE — 3008F PR BODY MASS INDEX (BMI) DOCUMENTED: ICD-10-PCS | Mod: CPTII,S$GLB,, | Performed by: ANESTHESIOLOGY

## 2022-08-05 PROCEDURE — 3066F PR DOCUMENTATION OF TREATMENT FOR NEPHROPATHY: ICD-10-PCS | Mod: CPTII,S$GLB,, | Performed by: ANESTHESIOLOGY

## 2022-08-05 PROCEDURE — 3288F PR FALLS RISK ASSESSMENT DOCUMENTED: ICD-10-PCS | Mod: CPTII,S$GLB,, | Performed by: ANESTHESIOLOGY

## 2022-08-05 PROCEDURE — 1159F PR MEDICATION LIST DOCUMENTED IN MEDICAL RECORD: ICD-10-PCS | Mod: CPTII,S$GLB,, | Performed by: ANESTHESIOLOGY

## 2022-08-05 PROCEDURE — 4010F ACE/ARB THERAPY RXD/TAKEN: CPT | Mod: CPTII,S$GLB,, | Performed by: ANESTHESIOLOGY

## 2022-08-05 PROCEDURE — 99999 PR PBB SHADOW E&M-EST. PATIENT-LVL V: CPT | Mod: PBBFAC,,, | Performed by: ANESTHESIOLOGY

## 2022-08-05 PROCEDURE — 3075F SYST BP GE 130 - 139MM HG: CPT | Mod: CPTII,S$GLB,, | Performed by: ANESTHESIOLOGY

## 2022-08-05 PROCEDURE — 1157F PR ADVANCE CARE PLAN OR EQUIV PRESENT IN MEDICAL RECORD: ICD-10-PCS | Mod: CPTII,S$GLB,, | Performed by: ANESTHESIOLOGY

## 2022-08-05 PROCEDURE — 1101F PR PT FALLS ASSESS DOC 0-1 FALLS W/OUT INJ PAST YR: ICD-10-PCS | Mod: CPTII,S$GLB,, | Performed by: ANESTHESIOLOGY

## 2022-08-05 PROCEDURE — 99999 PR PBB SHADOW E&M-EST. PATIENT-LVL V: ICD-10-PCS | Mod: PBBFAC,,, | Performed by: ANESTHESIOLOGY

## 2022-08-05 PROCEDURE — 99499 UNLISTED E&M SERVICE: CPT | Mod: S$GLB,,, | Performed by: ANESTHESIOLOGY

## 2022-08-05 PROCEDURE — 99203 OFFICE O/P NEW LOW 30 MIN: CPT | Mod: S$GLB,,, | Performed by: ANESTHESIOLOGY

## 2022-08-05 PROCEDURE — 99203 PR OFFICE/OUTPT VISIT, NEW, LEVL III, 30-44 MIN: ICD-10-PCS | Mod: S$GLB,,, | Performed by: ANESTHESIOLOGY

## 2022-08-05 PROCEDURE — 3079F PR MOST RECENT DIASTOLIC BLOOD PRESSURE 80-89 MM HG: ICD-10-PCS | Mod: CPTII,S$GLB,, | Performed by: ANESTHESIOLOGY

## 2022-08-05 PROCEDURE — 99499 RISK ADDL DX/OHS AUDIT: ICD-10-PCS | Mod: S$GLB,,, | Performed by: ANESTHESIOLOGY

## 2022-08-05 RX ORDER — METHOCARBAMOL 750 MG/1
750 TABLET, FILM COATED ORAL 2 TIMES DAILY PRN
Qty: 60 TABLET | Refills: 1 | Status: SHIPPED | OUTPATIENT
Start: 2022-08-05 | End: 2022-09-01 | Stop reason: SDUPTHER

## 2022-08-05 NOTE — PROGRESS NOTES
New Patient Interventional Pain Note (Initial Visit)    Referring Physician: Luz Dickson NP    PCP: Luz Dickson NP    Chief Complaint: Low Back Pain     SUBJECTIVE:    Nadia Damon is a 67 y.o. female who presents to the clinic for the evaluation of lower back pain. The pain started 1 year ago and symptoms have been worsening.The pain is located in the right lower back and right buttocks area with radiation to the posterior lateral aspect of her right thigh and occasionally her right calf.  The pain is described as aching, shooting and stabbing and is rated as 5/10.   The pain is rated with a score of  2/10 on the BEST day and a score of 9/10 on the WORST day.  Symptoms interfere with daily activity and work. The pain is exacerbated by Sitting, Standing, Extension and Flexing.  The pain is mitigated by physical therapy and rest.     Patient denies night fever/night sweats, urinary incontinence, bowel incontinence, significant weight loss, significant motor weakness and loss of sensations.      Non-Pharmacologic Treatments:  Physical Therapy/Home Exercise: yes  Ice/Heat:yes  TENS: no  Acupuncture: no  Massage: no  Chiropractic: no        Previous Pain Medications:  Tylenol, topicals, neuropathic,      report:  Reviewed and consistent with medication use as prescribed.    Pain Procedures:   none      Imaging:       Results for orders placed during the hospital encounter of 07/13/22    X-Ray Lumbar Spine 5 View    Narrative  EXAMINATION:  XR LUMBAR SPINE COMPLETE 5 VIEW    CLINICAL HISTORY:  Sacrococcygeal disorders, not elsewhere classified    TECHNIQUE:  AP, lateral, spot and bilateral oblique views of the lumbar spine were performed.    COMPARISON:  Lumbar spine radiographs May 17, 2018    FINDINGS:  Minimal grade 1 anterolisthesis of L4 on L5.  No fracture or pars defect.  Unchanged mild disc height loss at the L4-L5 level.  Moderate to severe degenerative disc disease at the T11-T12 level.   Prominent inferior lumbar spine facet arthropathy.  Sacroiliac joints appear normal.  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  No osseous erosion or suspicious osseous lesion.    Impression  As above.      Electronically signed by: Isac Bell  Date:    07/13/2022  Time:    15:39          Past Medical History:   Diagnosis Date    Abdominal wall hernia     CT Renal 6/11/2018---Small fat containing superior ventral abdominal wall hernia at the epicardial pacing lead site.    Anxiety     Arthritis     ZEN HIPS    Breast cancer in female 08/2021    LEFT BREAST    Cellulitis of axilla, left 12/23/2021    Chronic diastolic heart failure 12/16/2021    Chronic kidney disease     stage 4, GFR 15-29 ml/min    Chronic midline low back pain without sciatica 06/18/2018    Coronary artery disease 1993    heart transplant    Depression     Fibromyalgia     on Lyrica    Heart failure     native heart cardiomyopathy    Heart transplanted 1993    due to cardiomyopathy    History of hyperparathyroidism; Hyperparathyroidism, secondary renal     PT DENIES    Hypertension     Immune disorder     anti rejection meds    Iron deficiency anemia 08/15/2017    Kidney stones     passed per pt    Obesity     Other osteoporosis without current pathological fracture 08/30/2019    Shingles 2003 approx    left leg    Trouble in sleeping     Urinary incontinence      Past Surgical History:   Procedure Laterality Date    BLADDER SURGERY  2015 approx    mesh - Dr Everett then 2nd reconstructive sx Dr Onofre    BREAST BIOPSY Bilateral     NEGATIVE    BREAST LUMPECTOMY Left 2021    BREAST SURGERY Left 09/28/2015    Bx - benign    BREAST SURGERY Right 12/2015    Bx benign    CARDIAC PACEMAKER REMOVAL Left 06/26/2014    Pacer defirillator removed. Put in 1993 aat time of heart transplant    CARPAL TUNNEL RELEASE Left 03/03/2015    Dr. Hall    COLONOSCOPY N/A  2021    Procedure: COLONOSCOPY;  Surgeon: Freida Ramirez MD;  Location: [facility] ENDO;  Service: Endoscopy;  Laterality: N/A;    HEART TRANSPLANT      HERNIA REPAIR Right 1971 approx    Inguinal    HYSTERECTOMY      vag hyst /LSO     INCISION AND DRAINAGE OF ABSCESS Left 2021    Procedure: INCISION AND DRAINAGE, ABSCESS;  Surgeon: Joseph Longo MD;  Location: [facility] OR;  Service: General;  Laterality: Left;    INSERTION OF TUNNELED CENTRAL VENOUS CATHETER (CVC) WITH SUBCUTANEOUS PORT N/A 2021    Procedure: [id number];  Surgeon: Christoph Douglas MD;  Location: [facility] OR;  Service: General;  Laterality: N/A;    REMOVAL OF VASCULAR ACCESS PORT      SENTINEL LYMPH NODE BIOPSY Left 10/12/2021    Procedure: BIOPSY, LYMPH NODE, SENTINEL;  Surgeon: Christoph Douglas MD;  Location: [facility] OR;  Service: General;  Laterality: Left;    TOE SURGERY       Social History     Socioeconomic History    Marital status: Single    Number of children: 2    Highest education level: 11th grade   Occupational History    Occupation: Retired   Tobacco Use    Smoking status: Never Smoker    Smokeless tobacco: Never Used   Substance and Sexual Activity    Alcohol use: Never     Alcohol/week: 0.0 standard drinks    Drug use: No    Sexual activity: Not Currently     Partners: Male     Birth control/protection: See Surgical Hx   Other Topics Concern    Are you pregnant or think you may be? No    Breast-feeding No   Social History Narrative    Single. 2 children , 1  at 30 yoa   strep throat -  pneumonia and renal complications after not completing course of AB. Other child lives in [place], [place]. Has a cousin locally that could help in an emergency. 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 Caffeine intake =- 1 cola a day. No coffee, + occasional tea, avoids caffeine especially at night. Still drives. She does not have a Living Will or Advanced directive.       Family History   Problem Relation Age of Onset    Cancer Mother 38        breast    Breast cancer Mother     Heart disease Maternal Grandmother     Cataracts Cousin     Hypertension Son     Diabetes Neg Hx     Stroke Neg Hx     Kidney disease Neg Hx     Asthma Neg Hx     COPD Neg Hx     Melanoma Neg Hx     Hyperlipidemia Neg Hx        Review of patient's allergies indicates:   Allergen Reactions    Lisinopril Swelling and Rash    Augmentin [amoxicillin-pot clavulanate] Diarrhea       Current Outpatient Medications   Medication Sig    acetaminophen (TYLENOL) 325 MG tablet Take 1 tablet (325 mg total) by mouth every 6 (six) hours as needed for Pain.    amLODIPine (NORVASC) 2.5 MG tablet     aspirin (ECOTRIN) 81 MG EC tablet Take 1 tablet (81 mg total) by mouth once daily.    azelastine (ASTELIN) 137 mcg (0.1 %) nasal spray 2 sprays (274 mcg total) by Nasal route 2 (two) times daily.    biotin 10,000 mcg Cap Take 1 tablet by mouth once daily.    busPIRone (BUSPAR) 10 MG tablet Take 1 tablet (10 mg total) by mouth once daily.    calcitonin, salmon, (FORTICAL) 200 unit/actuation nasal spray 1 spray by Nasal route once daily.    carvediloL (COREG) 6.25 MG tablet Take 1 tablet (6.25 mg total) by mouth 2 (two) times daily with meals.    cycloSPORINE modified, NEORAL, (NEORAL) 25 MG capsule TAKE 3 CAPSULES (75 MG TOTAL) BY MOUTH 2 (TWO) TIMES DAILY.    DULoxetine (CYMBALTA) 30 MG capsule TAKE 1 CAPSULE EVERY DAY    EVENING PRIMROSE OIL ORAL Take 1,000 mg by mouth once daily.    furosemide (LASIX) 20 MG tablet Take 1 tablet (20 mg total) by mouth 2 (two) times a day for 5 days, THEN 1 tablet (20 mg total) once daily. Take 1 tablet daily.    hydrALAZINE (APRESOLINE) 50 MG tablet Take 1 tablet (50 mg total) by mouth every 8 (eight) hours. TAKE 1 TABLETS  EVERY 8  HOURS.    losartan (COZAAR) 25 MG tablet Take 1 tablet (25 mg total) by mouth once daily.    multivitamin capsule Take 1 capsule by  mouth once daily.    ondansetron (ZOFRAN-ODT) 8 MG TbDL Take 1 tablet (8 mg total) by mouth every 8 (eight) hours as needed (nausea).    pantoprazole (PROTONIX) 40 MG tablet TAKE 1 TABLET EVERY DAY    pravastatin (PRAVACHOL) 20 MG tablet Take 1 tablet (20 mg total) by mouth once daily.    pregabalin (LYRICA) 50 MG capsule TAKE 1 CAPSULE 2 TIMES DAILY AT NOON AND NIGHT TIME    RETACRIT 20,000 unit/mL injection     temazepam (RESTORIL) 30 mg capsule Take 1 at bedtime    vitamin E 400 UNIT capsule Take 400 Units by mouth once daily.    zolpidem (AMBIEN) 10 mg Tab TAKE 1 TABLET BY MOUTH ONCE DAILY IN THE EVENING    methocarbamoL (ROBAXIN) 750 MG Tab Take 1 tablet (750 mg total) by mouth 2 (two) times daily as needed (Pain).     Current Facility-Administered Medications   Medication    denosumab (PROLIA) injection 60 mg     Facility-Administered Medications Ordered in Other Visits   Medication    lactated ringers infusion         ROS  Review of Systems   Constitutional: Negative for chills, diaphoresis, fatigue and fever.   HENT: Negative for ear discharge, ear pain, rhinorrhea, trouble swallowing and voice change.    Eyes: Negative for pain and redness.   Respiratory: Negative for chest tightness, shortness of breath, wheezing and stridor.    Cardiovascular: Positive for leg swelling. Negative for chest pain.   Gastrointestinal: Negative for blood in stool, diarrhea, nausea and vomiting.   Endocrine: Negative for cold intolerance and heat intolerance.   Genitourinary: Negative for dysuria, hematuria and urgency.   Musculoskeletal: Positive for arthralgias, back pain and myalgias. Negative for gait problem, joint swelling, neck pain and neck stiffness.   Skin: Negative for rash.   Neurological: Negative for tremors, seizures, speech difficulty, weakness, light-headedness, numbness and headaches.   Hematological: Does not bruise/bleed easily.   Psychiatric/Behavioral: Negative for agitation, confusion and  "suicidal ideas. The patient is not nervous/anxious.             OBJECTIVE:  /82   Pulse 92   Resp 17   Ht 5' 2" (1.575 m)   Wt 77.1 kg (169 lb 15.6 oz)   LMP 06/20/1983 (Within Years)   BMI 31.09 kg/m²         Physical Exam  Constitutional:       General: She is not in acute distress.     Appearance: Normal appearance. She is not ill-appearing.   HENT:      Head: Normocephalic and atraumatic.      Nose: No congestion or rhinorrhea.      Mouth/Throat:      Mouth: Mucous membranes are moist.   Eyes:      Extraocular Movements: Extraocular movements intact.      Pupils: Pupils are equal, round, and reactive to light.   Cardiovascular:      Pulses: Normal pulses.   Pulmonary:      Effort: Pulmonary effort is normal.   Abdominal:      General: Abdomen is flat.      Palpations: Abdomen is soft.   Musculoskeletal:      Cervical back: Normal range of motion and neck supple.   Skin:     General: Skin is warm and dry.      Capillary Refill: Capillary refill takes less than 2 seconds.   Neurological:      General: No focal deficit present.      Mental Status: She is alert and oriented to person, place, and time.      Cranial Nerves: No cranial nerve deficit.      Sensory: No sensory deficit.      Motor: No weakness or abnormal muscle tone.      Gait: Gait abnormal.      Deep Tendon Reflexes: Babinski sign absent on the right side. Babinski sign absent on the left side.      Reflex Scores:       Tricep reflexes are 2+ on the right side and 2+ on the left side.       Bicep reflexes are 2+ on the right side and 2+ on the left side.       Brachioradialis reflexes are 2+ on the right side and 2+ on the left side.       Patellar reflexes are 2+ on the right side and 2+ on the left side.       Achilles reflexes are 2+ on the right side and 2+ on the left side.  Psychiatric:         Mood and Affect: Mood normal.         Behavior: Behavior normal.         Thought Content: Thought content normal. "           Musculoskeletal:    Cervical Exam  Incision: no  Pain with Flexion: no  Pain with Extension: no      Lumbar Exam  Incision: no  Pain with Flexion: yes  Pain with Extension: yes  ROM:  Decreased secondary to pain  Paraspinous TTP:  Right greater than left  Facet TTP:  L5-S1  Facet Loading:  Positive on the right  SLR:  Positive on the right  SIJ TTP:  Positive on the right with positive compression distraction as well  ALBERTO:  Positive on the right      LABS:  Lab Results   Component Value Date    WBC 4.99 07/14/2022    HGB 9.2 (L) 07/14/2022    HCT 28.9 (L) 07/14/2022    MCV 90 07/14/2022     07/14/2022       CMP  Sodium   Date Value Ref Range Status   07/14/2022 143 136 - 145 mmol/L Final     Potassium   Date Value Ref Range Status   07/14/2022 4.9 3.5 - 5.1 mmol/L Final     Chloride   Date Value Ref Range Status   07/14/2022 109 95 - 110 mmol/L Final     CO2   Date Value Ref Range Status   07/14/2022 24 23 - 29 mmol/L Final     Glucose   Date Value Ref Range Status   07/14/2022 81 70 - 110 mg/dL Final     BUN   Date Value Ref Range Status   07/14/2022 44 (H) 8 - 23 mg/dL Final     Creatinine   Date Value Ref Range Status   07/14/2022 2.8 (H) 0.5 - 1.4 mg/dL Final     Calcium   Date Value Ref Range Status   07/14/2022 8.6 (L) 8.7 - 10.5 mg/dL Final     Total Protein   Date Value Ref Range Status   07/14/2022 7.8 6.0 - 8.4 g/dL Final     Albumin   Date Value Ref Range Status   07/14/2022 3.4 (L) 3.5 - 5.2 g/dL Final     Total Bilirubin   Date Value Ref Range Status   07/14/2022 0.4 0.1 - 1.0 mg/dL Final     Comment:     For infants and newborns, interpretation of results should be based  on gestational age, weight and in agreement with clinical  observations.    Premature Infant recommended reference ranges:  Up to 24 hours.............<8.0 mg/dL  Up to 48 hours............<12.0 mg/dL  3-5 days..................<15.0 mg/dL  6-29 days.................<15.0 mg/dL       Alkaline Phosphatase   Date  Value Ref Range Status   07/14/2022 143 (H) 55 - 135 U/L Final     AST   Date Value Ref Range Status   07/14/2022 30 10 - 40 U/L Final     ALT   Date Value Ref Range Status   07/14/2022 26 10 - 44 U/L Final     Anion Gap   Date Value Ref Range Status   07/14/2022 10 8 - 16 mmol/L Final     eGFR if    Date Value Ref Range Status   07/14/2022 19 (A) >60 mL/min/1.73 m^2 Final     eGFR if non    Date Value Ref Range Status   07/14/2022 17 (A) >60 mL/min/1.73 m^2 Final     Comment:     Calculation used to obtain the estimated glomerular filtration  rate (eGFR) is the CKD-EPI equation.          Lab Results   Component Value Date    HGBA1C 5.2 12/24/2021             ASSESSMENT:       67 y.o. year old female with lower back pain, consistent with     1. Sacroiliitis  methocarbamoL (ROBAXIN) 750 MG Tab    IR SI Joint Injection w/Imaging    Case Request-RAD/Other Procedure Area: Right SIJ Injection  Right L5/S1 Facte Injection   2. DDD (degenerative disc disease), thoracic  Ambulatory referral/consult to Pain Clinic   3. Lumbar spondylosis  methocarbamoL (ROBAXIN) 750 MG Tab    IR SI Joint Injection w/Imaging    Case Request-RAD/Other Procedure Area: Right SIJ Injection  Right L5/S1 Facte Injection   4. Lumbar disc disease     5. Lumbar radiculopathy     6. Heart transplanted       Sacroiliitis  -     methocarbamoL (ROBAXIN) 750 MG Tab; Take 1 tablet (750 mg total) by mouth 2 (two) times daily as needed (Pain).  Dispense: 60 tablet; Refill: 1  -     IR SI Joint Injection w/Imaging; Future; Expected date: 08/05/2022  -     Case Request-RAD/Other Procedure Area: Right SIJ Injection  Right L5/S1 Facte Injection    DDD (degenerative disc disease), thoracic  -     Ambulatory referral/consult to Pain Clinic    Lumbar spondylosis  -     methocarbamoL (ROBAXIN) 750 MG Tab; Take 1 tablet (750 mg total) by mouth 2 (two) times daily as needed (Pain).  Dispense: 60 tablet; Refill: 1  -     IR SI Joint  Injection w/Imaging; Future; Expected date: 08/05/2022  -     Case Request-RAD/Other Procedure Area: Right SIJ Injection  Right L5/S1 Facte Injection    Lumbar disc disease    Lumbar radiculopathy    Heart transplanted             PLAN:   - Interventions:   We will schedule patient for right sacroiliac joint injection levels right L5-S1 facet injection  - Anticoagulation use:   no no anticoagulation    - Medications:   Continue Tylenol, Cymbalta, and Lyrica as prescribed by primary care   Start Robaxin 750 mg twice a day as needed  - Therapy:    Continue physical therapy home exercises.    - Imaging:   X-ray Lumbar  reviewed and findings discussed the patient.  Significant for grade 1 anterolisthesis of L4 upon L5.  Degenerative disc disease at T11-T12.  As well as pseudo joint formation of right L5 transverse process with superior sacrum   Can consider CT of lumbar spine if lower back pain continues    - Consults:   Continue follow-up with cardiology for previous heart transplant    - Counseled patient regarding the importance of activity modification and physical therapy    - Patient Questions: Answered all of the patient's questions regarding diagnosis, therapy, and treatment    - Follow up visit: return to clinic 2 weeks after procedure        The above plan and management options were discussed at length with patient. Patient is in agreement with the above and verbalized understanding.    I discussed the goals of interventional chronic pain management with the patient on today's visit.  I explained the utility of injections for diagnostic and therapeutic purposes.  We discussed a multimodal approach to pain including treating the patient's given worst pain at any given time.  We will use a systematic approach to addressing pain.  We will also adopt a multimodal approach that includes injections, adjuvant medications, physical therapy, at times psychiatry.  There may be a limited role for opioid use  intermittently in the treatment of pain, more particularly for acute pain although no one approach can be used as a sole treatment modality.    I emphasized the importance of regular exercise, core strengthening and stretching, diet and weight loss as a cornerstone of long-term pain management.      Jassi Pierre MD  Interventional Pain Management  [facility]    Disclaimer:  This note was prepared using voice recognition system and is likely to have sound alike errors that may have been overlooked even after proof reading.  Please call me with any questions

## 2022-08-05 NOTE — H&P (VIEW-ONLY)
New Patient Interventional Pain Note (Initial Visit)    Referring Physician: Luz Dickson NP    PCP: Luz Dickson NP    Chief Complaint: Low Back Pain     SUBJECTIVE:    Nadia Damon is a 67 y.o. female who presents to the clinic for the evaluation of lower back pain. The pain started 1 year ago and symptoms have been worsening.The pain is located in the right lower back and right buttocks area with radiation to the posterior lateral aspect of her right thigh and occasionally her right calf.  The pain is described as aching, shooting and stabbing and is rated as 5/10.   The pain is rated with a score of  2/10 on the BEST day and a score of 9/10 on the WORST day.  Symptoms interfere with daily activity and work. The pain is exacerbated by Sitting, Standing, Extension and Flexing.  The pain is mitigated by physical therapy and rest.     Patient denies night fever/night sweats, urinary incontinence, bowel incontinence, significant weight loss, significant motor weakness and loss of sensations.      Non-Pharmacologic Treatments:  Physical Therapy/Home Exercise: yes  Ice/Heat:yes  TENS: no  Acupuncture: no  Massage: no  Chiropractic: no        Previous Pain Medications:  Tylenol, topicals, neuropathic,      report:  Reviewed and consistent with medication use as prescribed.    Pain Procedures:   none      Imaging:       Results for orders placed during the hospital encounter of 07/13/22    X-Ray Lumbar Spine 5 View    Narrative  EXAMINATION:  XR LUMBAR SPINE COMPLETE 5 VIEW    CLINICAL HISTORY:  Sacrococcygeal disorders, not elsewhere classified    TECHNIQUE:  AP, lateral, spot and bilateral oblique views of the lumbar spine were performed.    COMPARISON:  Lumbar spine radiographs May 17, 2018    FINDINGS:  Minimal grade 1 anterolisthesis of L4 on L5.  No fracture or pars defect.  Unchanged mild disc height loss at the L4-L5 level.  Moderate to severe degenerative disc disease at the T11-T12 level.   Prominent inferior lumbar spine facet arthropathy.  Sacroiliac joints appear normal.  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  No osseous erosion or suspicious osseous lesion.    Impression  As above.      Electronically signed by: Isac Bell  Date:    07/13/2022  Time:    15:39          Past Medical History:   Diagnosis Date    Abdominal wall hernia     CT Renal 6/11/2018---Small fat containing superior ventral abdominal wall hernia at the epicardial pacing lead site.    Anxiety     Arthritis     ZEN HIPS    Breast cancer in female 08/2021    LEFT BREAST    Cellulitis of axilla, left 12/23/2021    Chronic diastolic heart failure 12/16/2021    Chronic kidney disease     stage 4, GFR 15-29 ml/min    Chronic midline low back pain without sciatica 06/18/2018    Coronary artery disease 1993    heart transplant    Depression     Fibromyalgia     on Lyrica    Heart failure     native heart cardiomyopathy    Heart transplanted 1993    due to cardiomyopathy    History of hyperparathyroidism; Hyperparathyroidism, secondary renal     PT DENIES    Hypertension     Immune disorder     anti rejection meds    Iron deficiency anemia 08/15/2017    Kidney stones     passed per pt    Obesity     Other osteoporosis without current pathological fracture 08/30/2019    Shingles 2003 approx    left leg    Trouble in sleeping     Urinary incontinence      Past Surgical History:   Procedure Laterality Date    BLADDER SURGERY  2015 approx    mesh - Dr Everett then 2nd reconstructive sx Dr Onofre    BREAST BIOPSY Bilateral     NEGATIVE    BREAST LUMPECTOMY Left 2021    BREAST SURGERY Left 09/28/2015    Bx - benign    BREAST SURGERY Right 12/2015    Bx benign    CARDIAC PACEMAKER REMOVAL Left 06/26/2014    Pacer defirillator removed. Put in 1993 aat time of heart transplant    CARPAL TUNNEL RELEASE Left 03/03/2015    Dr. Hall    COLONOSCOPY N/A  2021    Procedure: COLONOSCOPY;  Surgeon: Freida Ramirez MD;  Location: [facility] [place] [facility];  Service: Endoscopy;  Laterality: N/A;    HEART TRANSPLANT      HERNIA REPAIR Right 1971 approx    Inguinal    HYSTERECTOMY      vag hyst /LSO     INCISION AND DRAINAGE OF ABSCESS Left 2021    Procedure: INCISION AND DRAINAGE, ABSCESS;  Surgeon: Joseph Longo MD;  Location: [facility] [place] OR;  Service: General;  Laterality: Left;    INSERTION OF TUNNELED CENTRAL VENOUS CATHETER (CVC) WITH SUBCUTANEOUS PORT N/A 2021    Procedure: [id number];  Surgeon: Christoph Douglas MD;  [facility];  Service: General;  Laterality: N/A;    REMOVAL OF VASCULAR ACCESS PORT      SENTINEL LYMPH NODE BIOPSY Left 10/12/2021    Procedure: BIOPSY, LYMPH NODE, SENTINEL;  Surgeon: Christoph Douglas MD;  Location: [facility] [place] OR;  Service: General;  Laterality: Left;    TOE SURGERY       Social History     Socioeconomic History    Marital status: Single    Number of children: 2    Highest education level: 11th grade   Occupational History    Occupation: Retired   Tobacco Use    Smoking status: Never Smoker    Smokeless tobacco: Never Used   Substance and Sexual Activity    Alcohol use: Never     Alcohol/week: 0.0 standard drinks    Drug use: No    Sexual activity: Not Currently     Partners: Male     Birth control/protection: See Surgical Hx   Other Topics Concern    Are you pregnant or think you may be? No    Breast-feeding No   Social History Narrative    Single. 2 children , 1  at 30 yoa   strep throat -  pneumonia and renal complications after not completing course of AB. Other child lives in [place], [place]. Has a cousin locally that could help in an emergency. 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 Caffeine intake =- 1 cola a day. No coffee, + occasional tea, avoids caffeine especially at night. Still drives. She does not have a Living Will or Advanced directive.       Family History   Problem Relation Age of Onset    Cancer Mother 38        breast    Breast cancer Mother     Heart disease Maternal Grandmother     Cataracts Cousin     Hypertension Son     Diabetes Neg Hx     Stroke Neg Hx     Kidney disease Neg Hx     Asthma Neg Hx     COPD Neg Hx     Melanoma Neg Hx     Hyperlipidemia Neg Hx        Review of patient's allergies indicates:   Allergen Reactions    Lisinopril Swelling and Rash    Augmentin [amoxicillin-pot clavulanate] Diarrhea       Current Outpatient Medications   Medication Sig    acetaminophen (TYLENOL) 325 MG tablet Take 1 tablet (325 mg total) by mouth every 6 (six) hours as needed for Pain.    amLODIPine (NORVASC) 2.5 MG tablet     aspirin (ECOTRIN) 81 MG EC tablet Take 1 tablet (81 mg total) by mouth once daily.    azelastine (ASTELIN) 137 mcg (0.1 %) nasal spray 2 sprays (274 mcg total) by Nasal route 2 (two) times daily.    biotin 10,000 mcg Cap Take 1 tablet by mouth once daily.    busPIRone (BUSPAR) 10 MG tablet Take 1 tablet (10 mg total) by mouth once daily.    calcitonin, salmon, (FORTICAL) 200 unit/actuation nasal spray 1 spray by Nasal route once daily.    carvediloL (COREG) 6.25 MG tablet Take 1 tablet (6.25 mg total) by mouth 2 (two) times daily with meals.    cycloSPORINE modified, NEORAL, (NEORAL) 25 MG capsule TAKE 3 CAPSULES (75 MG TOTAL) BY MOUTH 2 (TWO) TIMES DAILY.    DULoxetine (CYMBALTA) 30 MG capsule TAKE 1 CAPSULE EVERY DAY    EVENING PRIMROSE OIL ORAL Take 1,000 mg by mouth once daily.    furosemide (LASIX) 20 MG tablet Take 1 tablet (20 mg total) by mouth 2 (two) times a day for 5 days, THEN 1 tablet (20 mg total) once daily. Take 1 tablet daily.    hydrALAZINE (APRESOLINE) 50 MG tablet Take 1 tablet (50 mg total) by mouth every 8 (eight) hours. TAKE 1 TABLETS  EVERY 8  HOURS.    losartan (COZAAR) 25 MG tablet Take 1 tablet (25 mg total) by mouth once daily.    multivitamin capsule Take 1 capsule by  mouth once daily.    ondansetron (ZOFRAN-ODT) 8 MG TbDL Take 1 tablet (8 mg total) by mouth every 8 (eight) hours as needed (nausea).    pantoprazole (PROTONIX) 40 MG tablet TAKE 1 TABLET EVERY DAY    pravastatin (PRAVACHOL) 20 MG tablet Take 1 tablet (20 mg total) by mouth once daily.    pregabalin (LYRICA) 50 MG capsule TAKE 1 CAPSULE 2 TIMES DAILY AT NOON AND NIGHT TIME    RETACRIT 20,000 unit/mL injection     temazepam (RESTORIL) 30 mg capsule Take 1 at bedtime    vitamin E 400 UNIT capsule Take 400 Units by mouth once daily.    zolpidem (AMBIEN) 10 mg Tab TAKE 1 TABLET BY MOUTH ONCE DAILY IN THE EVENING    methocarbamoL (ROBAXIN) 750 MG Tab Take 1 tablet (750 mg total) by mouth 2 (two) times daily as needed (Pain).     Current Facility-Administered Medications   Medication    denosumab (PROLIA) injection 60 mg     Facility-Administered Medications Ordered in Other Visits   Medication    lactated ringers infusion         ROS  Review of Systems   Constitutional: Negative for chills, diaphoresis, fatigue and fever.   HENT: Negative for ear discharge, ear pain, rhinorrhea, trouble swallowing and voice change.    Eyes: Negative for pain and redness.   Respiratory: Negative for chest tightness, shortness of breath, wheezing and stridor.    Cardiovascular: Positive for leg swelling. Negative for chest pain.   Gastrointestinal: Negative for blood in stool, diarrhea, nausea and vomiting.   Endocrine: Negative for cold intolerance and heat intolerance.   Genitourinary: Negative for dysuria, hematuria and urgency.   Musculoskeletal: Positive for arthralgias, back pain and myalgias. Negative for gait problem, joint swelling, neck pain and neck stiffness.   Skin: Negative for rash.   Neurological: Negative for tremors, seizures, speech difficulty, weakness, light-headedness, numbness and headaches.   Hematological: Does not bruise/bleed easily.   Psychiatric/Behavioral: Negative for agitation, confusion and  "suicidal ideas. The patient is not nervous/anxious.             OBJECTIVE:  /82   Pulse 92   Resp 17   Ht 5' 2" (1.575 m)   Wt 77.1 kg (169 lb 15.6 oz)   LMP 06/20/1983 (Within Years)   BMI 31.09 kg/m²         Physical Exam  Constitutional:       General: She is not in acute distress.     Appearance: Normal appearance. She is not ill-appearing.   HENT:      Head: Normocephalic and atraumatic.      Nose: No congestion or rhinorrhea.      Mouth/Throat:      Mouth: Mucous membranes are moist.   Eyes:      Extraocular Movements: Extraocular movements intact.      Pupils: Pupils are equal, round, and reactive to light.   Cardiovascular:      Pulses: Normal pulses.   Pulmonary:      Effort: Pulmonary effort is normal.   Abdominal:      General: Abdomen is flat.      Palpations: Abdomen is soft.   Musculoskeletal:      Cervical back: Normal range of motion and neck supple.   Skin:     General: Skin is warm and dry.      Capillary Refill: Capillary refill takes less than 2 seconds.   Neurological:      General: No focal deficit present.      Mental Status: She is alert and oriented to person, place, and time.      Cranial Nerves: No cranial nerve deficit.      Sensory: No sensory deficit.      Motor: No weakness or abnormal muscle tone.      Gait: Gait abnormal.      Deep Tendon Reflexes: Babinski sign absent on the right side. Babinski sign absent on the left side.      Reflex Scores:       Tricep reflexes are 2+ on the right side and 2+ on the left side.       Bicep reflexes are 2+ on the right side and 2+ on the left side.       Brachioradialis reflexes are 2+ on the right side and 2+ on the left side.       Patellar reflexes are 2+ on the right side and 2+ on the left side.       Achilles reflexes are 2+ on the right side and 2+ on the left side.  Psychiatric:         Mood and Affect: Mood normal.         Behavior: Behavior normal.         Thought Content: Thought content normal. "           Musculoskeletal:    Cervical Exam  Incision: no  Pain with Flexion: no  Pain with Extension: no      Lumbar Exam  Incision: no  Pain with Flexion: yes  Pain with Extension: yes  ROM:  Decreased secondary to pain  Paraspinous TTP:  Right greater than left  Facet TTP:  L5-S1  Facet Loading:  Positive on the right  SLR:  Positive on the right  SIJ TTP:  Positive on the right with positive compression distraction as well  ALBERTO:  Positive on the right      LABS:  Lab Results   Component Value Date    WBC 4.99 07/14/2022    HGB 9.2 (L) 07/14/2022    HCT 28.9 (L) 07/14/2022    MCV 90 07/14/2022     07/14/2022       CMP  Sodium   Date Value Ref Range Status   07/14/2022 143 136 - 145 mmol/L Final     Potassium   Date Value Ref Range Status   07/14/2022 4.9 3.5 - 5.1 mmol/L Final     Chloride   Date Value Ref Range Status   07/14/2022 109 95 - 110 mmol/L Final     CO2   Date Value Ref Range Status   07/14/2022 24 23 - 29 mmol/L Final     Glucose   Date Value Ref Range Status   07/14/2022 81 70 - 110 mg/dL Final     BUN   Date Value Ref Range Status   07/14/2022 44 (H) 8 - 23 mg/dL Final     Creatinine   Date Value Ref Range Status   07/14/2022 2.8 (H) 0.5 - 1.4 mg/dL Final     Calcium   Date Value Ref Range Status   07/14/2022 8.6 (L) 8.7 - 10.5 mg/dL Final     Total Protein   Date Value Ref Range Status   07/14/2022 7.8 6.0 - 8.4 g/dL Final     Albumin   Date Value Ref Range Status   07/14/2022 3.4 (L) 3.5 - 5.2 g/dL Final     Total Bilirubin   Date Value Ref Range Status   07/14/2022 0.4 0.1 - 1.0 mg/dL Final     Comment:     For infants and newborns, interpretation of results should be based  on gestational age, weight and in agreement with clinical  observations.    Premature Infant recommended reference ranges:  Up to 24 hours.............<8.0 mg/dL  Up to 48 hours............<12.0 mg/dL  3-5 days..................<15.0 mg/dL  6-29 days.................<15.0 mg/dL       Alkaline Phosphatase   Date  Value Ref Range Status   07/14/2022 143 (H) 55 - 135 U/L Final     AST   Date Value Ref Range Status   07/14/2022 30 10 - 40 U/L Final     ALT   Date Value Ref Range Status   07/14/2022 26 10 - 44 U/L Final     Anion Gap   Date Value Ref Range Status   07/14/2022 10 8 - 16 mmol/L Final     eGFR if    Date Value Ref Range Status   07/14/2022 19 (A) >60 mL/min/1.73 m^2 Final     eGFR if non    Date Value Ref Range Status   07/14/2022 17 (A) >60 mL/min/1.73 m^2 Final     Comment:     Calculation used to obtain the estimated glomerular filtration  rate (eGFR) is the CKD-EPI equation.          Lab Results   Component Value Date    HGBA1C 5.2 12/24/2021             ASSESSMENT:       67 y.o. year old female with lower back pain, consistent with     1. Sacroiliitis  methocarbamoL (ROBAXIN) 750 MG Tab    IR SI Joint Injection w/Imaging    Case Request-RAD/Other Procedure Area: Right SIJ Injection  Right L5/S1 Facte Injection   2. DDD (degenerative disc disease), thoracic  Ambulatory referral/consult to Pain Clinic   3. Lumbar spondylosis  methocarbamoL (ROBAXIN) 750 MG Tab    IR SI Joint Injection w/Imaging    Case Request-RAD/Other Procedure Area: Right SIJ Injection  Right L5/S1 Facte Injection   4. Lumbar disc disease     5. Lumbar radiculopathy     6. Heart transplanted       Sacroiliitis  -     methocarbamoL (ROBAXIN) 750 MG Tab; Take 1 tablet (750 mg total) by mouth 2 (two) times daily as needed (Pain).  Dispense: 60 tablet; Refill: 1  -     IR SI Joint Injection w/Imaging; Future; Expected date: 08/05/2022  -     Case Request-RAD/Other Procedure Area: Right SIJ Injection  Right L5/S1 Facte Injection    DDD (degenerative disc disease), thoracic  -     Ambulatory referral/consult to Pain Clinic    Lumbar spondylosis  -     methocarbamoL (ROBAXIN) 750 MG Tab; Take 1 tablet (750 mg total) by mouth 2 (two) times daily as needed (Pain).  Dispense: 60 tablet; Refill: 1  -     IR SI Joint  Injection w/Imaging; Future; Expected date: 08/05/2022  -     Case Request-RAD/Other Procedure Area: Right SIJ Injection  Right L5/S1 Facte Injection    Lumbar disc disease    Lumbar radiculopathy    Heart transplanted             PLAN:   - Interventions:   We will schedule patient for right sacroiliac joint injection levels right L5-S1 facet injection  - Anticoagulation use:   no no anticoagulation    - Medications:   Continue Tylenol, Cymbalta, and Lyrica as prescribed by primary care   Start Robaxin 750 mg twice a day as needed  - Therapy:    Continue physical therapy home exercises.    - Imaging:   X-ray Lumbar  reviewed and findings discussed the patient.  Significant for grade 1 anterolisthesis of L4 upon L5.  Degenerative disc disease at T11-T12.  As well as pseudo joint formation of right L5 transverse process with superior sacrum   Can consider CT of lumbar spine if lower back pain continues    - Consults:   Continue follow-up with cardiology for previous heart transplant    - Counseled patient regarding the importance of activity modification and physical therapy    - Patient Questions: Answered all of the patient's questions regarding diagnosis, therapy, and treatment    - Follow up visit: return to clinic 2 weeks after procedure        The above plan and management options were discussed at length with patient. Patient is in agreement with the above and verbalized understanding.    I discussed the goals of interventional chronic pain management with the patient on today's visit.  I explained the utility of injections for diagnostic and therapeutic purposes.  We discussed a multimodal approach to pain including treating the patient's given worst pain at any given time.  We will use a systematic approach to addressing pain.  We will also adopt a multimodal approach that includes injections, adjuvant medications, physical therapy, at times psychiatry.  There may be a limited role for opioid use  intermittently in the treatment of pain, more particularly for acute pain although no one approach can be used as a sole treatment modality.    I emphasized the importance of regular exercise, core strengthening and stretching, diet and weight loss as a cornerstone of long-term pain management.      Jassi Pierre MD  Interventional Pain Management  [facility]    Disclaimer:  This note was prepared using voice recognition system and is likely to have sound alike errors that may have been overlooked even after proof reading.  Please call me with any questions

## 2022-08-06 NOTE — PROGRESS NOTES
[facility]   Physical Therapy Treatment Note     Name: Nadia Damon  Clinic Number: [id number]    Therapy Diagnosis:   Encounter Diagnosis   Name Primary?    Impaired mobility Yes     Physician: Luz Dickson NP    Visit Date: 8/3/2022    Physician Orders: PT Eval and Treat   Medical Diagnosis from Referral: SI pain  Evaluation Date: 7/19/2022  Authorization Period Expiration: 7/13/2023  Plan of Care Expiration: 10/17/2022  Progress Note Due: 10 visits or 8/18/2022  Visit # / Visits authorized: 1/1; 4/20     FOTO  Number Date Score    1 7/19/2022 46%   2       3       4             Precautions: cancer      PTA Visit #: ---/5     Time In:1:15  Time Out: 2:00  Total Billable Time: 40 minutes    SUBJECTIVE     Pt reports: Right hip and back pain.  She was compliant with home exercise program.  Response to previous treatment: N/A  Functional change: N/A    Pain: 4/10  Location: right hip and buttocks      OBJECTIVE     Objective Measures updated at progress report unless specified.     Treatment     Nadia received the treatments listed below:      therapeutic exercises to develop strength, flexibility and core stabilization for 40 minutes including:  Nustep 5 min  Wall squats with ball 2x10  Bridges 2x10    Single knee to chest  Posterior pelvic tilts 2x10  Piriformis stretch  Standing hip extension    Moist heat at end of session to low back x 10 minutes      Patient Education and Home Exercises     Home Exercises Provided and Patient Education Provided     Education provided:   - Home program    Written Home Exercises Provided: Patient instructed to cont prior HEP. Exercises were reviewed and Nadia was able to demonstrate them prior to the end of the session.  Nadia demonstrated good  understanding of the education provided. See EMR under Patient Instructions for exercises provided during therapy sessions    ASSESSMENT     Nadia is a 67 y.o. female referred to outpatient Physical  Therapy with a medical diagnosis of SI pain. She states she is doing better.  She has bilateral hip and lateral back pain which impairs movements supine<>sit<>stand.  She was instructed in and performed exercises and stretches to improve mobility with pain reduction,    Nadia Is progressing well towards her goals.   Pt prognosis is Good.     Pt will continue to benefit from skilled outpatient physical therapy to address the deficits listed in the problem list box on initial evaluation, provide pt/family education and to maximize pt's level of independence in the home and community environment.     Pt's spiritual, cultural and educational needs considered and pt agreeable to plan of care and goals.     Anticipated barriers to physical therapy: none    Goals: Short Term Goals: In 4 weeks   1. I with HEP  2. Pt to increase lumbar ROM from knees to mid shin    3. Pt to have pain less than 6/10 at all times.  4. Pt will improve FOTO disability score to 40% disability or less in order to improve overall QOL and return to PLOF.       Long Term Goals: In 8 weeks  1.  Pt will improve FOTO disability score to 35% disability or less in order to improve overall QOL and return to PLOF.    2.  Patient to demo increase in LE strength to 4/5  3.  Patient to have decreased pain to 3/10 at all times.  4.  Patient to demo increase lumbar ROM to 90% flexion  5.  Patient to perform daily activities including walking without limitation.    PLAN     Plan of care Certification: 7/19/2022 to 10/17/2022.     Outpatient Physical Therapy 2 times weekly for 8 weeks to include the following interventions: Electrical Stimulation as needed, Gait Training, Manual Therapy, Moist Heat/ Ice, Neuromuscular Re-ed, Patient Education, Therapeutic Activities, Therapeutic Exercise and dry needling.     Omar Perez, PT

## 2022-08-08 ENCOUNTER — CLINICAL SUPPORT (OUTPATIENT)
Dept: REHABILITATION | Facility: HOSPITAL | Age: 67
End: 2022-08-08
Payer: MEDICARE

## 2022-08-08 DIAGNOSIS — Z74.09 IMPAIRED MOBILITY: Primary | ICD-10-CM

## 2022-08-08 PROCEDURE — 97140 MANUAL THERAPY 1/> REGIONS: CPT

## 2022-08-08 PROCEDURE — 97110 THERAPEUTIC EXERCISES: CPT

## 2022-08-08 NOTE — PROGRESS NOTES
[facility]   Physical Therapy Treatment Note     Name: Nadia Damon  Clinic Number: [id number]    Therapy Diagnosis:   Encounter Diagnosis   Name Primary?    Impaired mobility Yes     Physician: Luz Dickson NP    Visit Date: 8/8/2022    Physician Orders: PT Eval and Treat   Medical Diagnosis from Referral: SI pain  Evaluation Date: 7/19/2022  Authorization Period Expiration: 7/13/2023  Plan of Care Expiration: 10/17/2022  Progress Note Due: 10 visits or 8/18/2022  Visit # / Visits authorized: 1/1; 4/20     FOTO  Number Date Score    1 7/19/2022 46%   2       3       4             Precautions: cancer      PTA Visit #: ---/5     Time In:1:15  Time Out: 2:10  Total Billable Time: 40 minutes    SUBJECTIVE     Pt reports: Right hip and back pain.  She was compliant with home exercise program.  Response to previous treatment: N/A  Functional change: N/A    Pain: 4/10  Location: right hip and buttocks      OBJECTIVE     Objective Measures updated at progress report unless specified.     Treatment     Nadia received the treatments listed below:      therapeutic exercises to develop strength, flexibility and core stabilization for 40 minutes including:  Nustep 5 min  Wall squats with ball 2x10  Bridges 2x10    Single knee to chest  Posterior pelvic tilts 2x10  Piriformis stretch  Standing hip extension  Standing hip abduction    Palpation Assessment to determine the necessity for Functional Dry Needling - yes   Patient provided written and verbal consent to Functional Dry Needling- yes  Nadia received the following manual therapy techniques: palpation of tenderness and TPs to determine needle placement and STM in same area    Nadia demonstrated good  understanding of the education provided.     Patient demonstrated appropriate response to Functional Dry Needling. good  rhythmical contractions observed with estim to treated muscle groups:    L4-L5 paraspinals  Upper glut med and  max    Moist heat at end of session to low back x 10 minutes      Patient Education and Home Exercises     Home Exercises Provided and Patient Education Provided     Education provided:   - Home program    Written Home Exercises Provided: Patient instructed to cont prior HEP. Exercises were reviewed and Nadia was able to demonstrate them prior to the end of the session.  Nadia demonstrated good  understanding of the education provided. See EMR under Patient Instructions for exercises provided during therapy sessions    ASSESSMENT     Nadia is a 67 y.o. female referred to outpatient Physical Therapy with a medical diagnosis of SI pain. She has painful transitions in movement and moves slowly and carefully    Nadia Is progressing well towards her goals.   Pt prognosis is Good.     Pt will continue to benefit from skilled outpatient physical therapy to address the deficits listed in the problem list box on initial evaluation, provide pt/family education and to maximize pt's level of independence in the home and community environment.     Pt's spiritual, cultural and educational needs considered and pt agreeable to plan of care and goals.     Anticipated barriers to physical therapy: none    Goals: Short Term Goals: In 4 weeks   1. I with HEP  2. Pt to increase lumbar ROM from knees to mid shin    3. Pt to have pain less than 6/10 at all times.  4. Pt will improve FOTO disability score to 40% disability or less in order to improve overall QOL and return to PLOF.       Long Term Goals: In 8 weeks  1.  Pt will improve FOTO disability score to 35% disability or less in order to improve overall QOL and return to PLOF.    2.  Patient to demo increase in LE strength to 4/5  3.  Patient to have decreased pain to 3/10 at all times.  4.  Patient to demo increase lumbar ROM to 90% flexion  5.  Patient to perform daily activities including walking without limitation.    PLAN     Plan of care Certification: 7/19/2022 to  10/17/2022.     Outpatient Physical Therapy 2 times weekly for 8 weeks to include the following interventions: Electrical Stimulation as needed, Gait Training, Manual Therapy, Moist Heat/ Ice, Neuromuscular Re-ed, Patient Education, Therapeutic Activities, Therapeutic Exercise and dry needling.     Omar Perez, PT

## 2022-08-10 ENCOUNTER — CLINICAL SUPPORT (OUTPATIENT)
Dept: REHABILITATION | Facility: HOSPITAL | Age: 67
End: 2022-08-10
Payer: MEDICARE

## 2022-08-10 DIAGNOSIS — Z74.09 IMPAIRED MOBILITY: Primary | ICD-10-CM

## 2022-08-10 PROCEDURE — 97110 THERAPEUTIC EXERCISES: CPT

## 2022-08-11 ENCOUNTER — TELEPHONE (OUTPATIENT)
Dept: NEPHROLOGY | Facility: CLINIC | Age: 67
End: 2022-08-11
Payer: MEDICARE

## 2022-08-11 DIAGNOSIS — N28.9 RENAL INSUFFICIENCY: Primary | ICD-10-CM

## 2022-08-11 NOTE — PROGRESS NOTES
[facility]   Physical Therapy Treatment Note     Name: Nadia Damon  Clinic Number: [id number]    Therapy Diagnosis:   Encounter Diagnosis   Name Primary?    Impaired mobility Yes     Physician: Luz Dickson NP    Visit Date: 8/10/2022    Physician Orders: PT Eval and Treat   Medical Diagnosis from Referral: SI pain  Evaluation Date: 7/19/2022  Authorization Period Expiration: 7/13/2023  Plan of Care Expiration: 10/17/2022  Progress Note Due: 10 visits or 8/18/2022  Visit # / Visits authorized: 1/1; 4/20     FOTO  Number Date Score    1 7/19/2022 46%   2       3       4             Precautions: cancer      PTA Visit #: ---/5     Time In:1:00  Time Out: 1:55  Total Billable Time: 40 minutes    SUBJECTIVE     Pt reports: Right hip and back pain.  She was compliant with home exercise program.  Response to previous treatment: N/A  Functional change: N/A    Pain: 4/10  Location: right hip and buttocks      OBJECTIVE     Objective Measures updated at progress report unless specified.     Treatment     Nadia received the treatments listed below:      therapeutic exercises to develop strength, flexibility and core stabilization for 40 minutes including:  Nustep 5 min  Wall squats with ball 2x10  Bridges 2x10  Single leg roll over  Resisted hip flexion  Single knee to chest  Posterior pelvic tilts 2x10  Piriformis stretch  Standing hip extension  Standing hip abduction  Prone hip extension    Moist heat at end of session to low back x 10 minutes      Patient Education and Home Exercises     Home Exercises Provided and Patient Education Provided     Education provided:   - Home program    Written Home Exercises Provided: Patient instructed to cont prior HEP. Exercises were reviewed and Nadia was able to demonstrate them prior to the end of the session.  Nadia demonstrated good  understanding of the education provided. See EMR under Patient Instructions for exercises provided during  therapy sessions    ASSESSMENT     Nadia is a 67 y.o. female referred to outpatient Physical Therapy with a medical diagnosis of SI pain. She has painful transitions in movement and moves slowly and carefully    Nadia Is progressing well towards her goals.   Pt prognosis is Good.     Pt will continue to benefit from skilled outpatient physical therapy to address the deficits listed in the problem list box on initial evaluation, provide pt/family education and to maximize pt's level of independence in the home and community environment.     Pt's spiritual, cultural and educational needs considered and pt agreeable to plan of care and goals.     Anticipated barriers to physical therapy: none    Goals: Short Term Goals: In 4 weeks   1. I with HEP  2. Pt to increase lumbar ROM from knees to mid shin    3. Pt to have pain less than 6/10 at all times.  4. Pt will improve FOTO disability score to 40% disability or less in order to improve overall QOL and return to PLOF.       Long Term Goals: In 8 weeks  1.  Pt will improve FOTO disability score to 35% disability or less in order to improve overall QOL and return to PLOF.    2.  Patient to demo increase in LE strength to 4/5  3.  Patient to have decreased pain to 3/10 at all times.  4.  Patient to demo increase lumbar ROM to 90% flexion  5.  Patient to perform daily activities including walking without limitation.    PLAN     Plan of care Certification: 7/19/2022 to 10/17/2022.     Outpatient Physical Therapy 2 times weekly for 8 weeks to include the following interventions: Electrical Stimulation as needed, Gait Training, Manual Therapy, Moist Heat/ Ice, Neuromuscular Re-ed, Patient Education, Therapeutic Activities, Therapeutic Exercise and dry needling.     Omar Perez, PT

## 2022-08-11 NOTE — TELEPHONE ENCOUNTER
----- Message from Perri Hess sent at 8/11/2022  1:14 PM CDT -----  Contact: pt  Type:  Sooner Apoointment Request    Caller is requesting a sooner appointment.  Caller declined first available appointment listed below.  Caller will not accept being placed on the waitlist and is requesting a message be sent to doctor.  Name of Caller: pt   When is the first available appointment?1/2022  Symptoms side pain back/   Would the patient rather a call back or a response via [facility]? phone  Best Call Back Number: [phone]  Additional Information: pt is having surgery coming up and not sure if it's kidney pain or pain from her back wants to be sure/ had labs on 07/14  Pt is a pt of Dr Crawford's

## 2022-08-12 ENCOUNTER — TELEPHONE (OUTPATIENT)
Dept: NEPHROLOGY | Facility: CLINIC | Age: 67
End: 2022-08-12
Payer: MEDICARE

## 2022-08-12 ENCOUNTER — LAB VISIT (OUTPATIENT)
Dept: LAB | Facility: HOSPITAL | Age: 67
End: 2022-08-12
Attending: INTERNAL MEDICINE
Payer: MEDICARE

## 2022-08-12 DIAGNOSIS — N18.4 CKD (CHRONIC KIDNEY DISEASE) STAGE 4, GFR 15-29 ML/MIN: Primary | ICD-10-CM

## 2022-08-12 DIAGNOSIS — N28.9 RENAL INSUFFICIENCY: ICD-10-CM

## 2022-08-12 PROCEDURE — 36415 COLL VENOUS BLD VENIPUNCTURE: CPT | Performed by: INTERNAL MEDICINE

## 2022-08-12 PROCEDURE — 80053 COMPREHEN METABOLIC PANEL: CPT | Performed by: INTERNAL MEDICINE

## 2022-08-12 NOTE — TELEPHONE ENCOUNTER
----- Message from Belinda Quiroz sent at 8/12/2022  3:03 PM CDT -----  Regarding: reorder labs  Please reorder urinalysis and protein/creatinine ratio for a home collection and future encounter. Patient could not urinate at lab appt today and would like to return with specimen another time.

## 2022-08-13 LAB
ALBUMIN SERPL BCP-MCNC: 3.5 G/DL (ref 3.5–5.2)
ALP SERPL-CCNC: 134 U/L (ref 55–135)
ALT SERPL W/O P-5'-P-CCNC: 22 U/L (ref 10–44)
ANION GAP SERPL CALC-SCNC: 12 MMOL/L (ref 8–16)
AST SERPL-CCNC: 33 U/L (ref 10–40)
BILIRUB SERPL-MCNC: 0.5 MG/DL (ref 0.1–1)
BUN SERPL-MCNC: 51 MG/DL (ref 8–23)
CALCIUM SERPL-MCNC: 9.4 MG/DL (ref 8.7–10.5)
CHLORIDE SERPL-SCNC: 105 MMOL/L (ref 95–110)
CO2 SERPL-SCNC: 22 MMOL/L (ref 23–29)
CREAT SERPL-MCNC: 2.8 MG/DL (ref 0.5–1.4)
EST. GFR  (NO RACE VARIABLE): 17.8 ML/MIN/1.73 M^2
GLUCOSE SERPL-MCNC: 94 MG/DL (ref 70–110)
POTASSIUM SERPL-SCNC: 4.9 MMOL/L (ref 3.5–5.1)
PROT SERPL-MCNC: 7.9 G/DL (ref 6–8.4)
SODIUM SERPL-SCNC: 139 MMOL/L (ref 136–145)

## 2022-08-15 ENCOUNTER — LAB VISIT (OUTPATIENT)
Dept: LAB | Facility: HOSPITAL | Age: 67
End: 2022-08-15
Attending: INTERNAL MEDICINE
Payer: MEDICARE

## 2022-08-15 ENCOUNTER — OFFICE VISIT (OUTPATIENT)
Dept: HEMATOLOGY/ONCOLOGY | Facility: CLINIC | Age: 67
End: 2022-08-15
Payer: MEDICARE

## 2022-08-15 ENCOUNTER — INFUSION (OUTPATIENT)
Dept: INFUSION THERAPY | Facility: HOSPITAL | Age: 67
End: 2022-08-15
Attending: INTERNAL MEDICINE
Payer: MEDICARE

## 2022-08-15 VITALS
DIASTOLIC BLOOD PRESSURE: 89 MMHG | HEART RATE: 97 BPM | SYSTOLIC BLOOD PRESSURE: 135 MMHG | HEIGHT: 62 IN | RESPIRATION RATE: 18 BRPM | BODY MASS INDEX: 31.56 KG/M2 | WEIGHT: 171.5 LBS | OXYGEN SATURATION: 99 % | TEMPERATURE: 98 F | HEART RATE: 95 BPM | OXYGEN SATURATION: 99 % | DIASTOLIC BLOOD PRESSURE: 78 MMHG | TEMPERATURE: 98 F | SYSTOLIC BLOOD PRESSURE: 143 MMHG

## 2022-08-15 DIAGNOSIS — D05.12 DUCTAL CARCINOMA IN SITU (DCIS) OF LEFT BREAST: Primary | ICD-10-CM

## 2022-08-15 DIAGNOSIS — M79.7 FIBROMYALGIA: ICD-10-CM

## 2022-08-15 DIAGNOSIS — M81.0 OSTEOPOROSIS, UNSPECIFIED OSTEOPOROSIS TYPE, UNSPECIFIED PATHOLOGICAL FRACTURE PRESENCE: ICD-10-CM

## 2022-08-15 DIAGNOSIS — N18.4 ANEMIA ASSOCIATED WITH STAGE 4 CHRONIC RENAL FAILURE: ICD-10-CM

## 2022-08-15 DIAGNOSIS — M54.50 CHRONIC MIDLINE LOW BACK PAIN WITHOUT SCIATICA: ICD-10-CM

## 2022-08-15 DIAGNOSIS — D63.1 ANEMIA ASSOCIATED WITH STAGE 4 CHRONIC RENAL FAILURE: Primary | ICD-10-CM

## 2022-08-15 DIAGNOSIS — D63.1 ANEMIA ASSOCIATED WITH STAGE 4 CHRONIC RENAL FAILURE: ICD-10-CM

## 2022-08-15 DIAGNOSIS — N18.4 ANEMIA ASSOCIATED WITH STAGE 4 CHRONIC RENAL FAILURE: Primary | ICD-10-CM

## 2022-08-15 DIAGNOSIS — G89.29 CHRONIC MIDLINE LOW BACK PAIN WITHOUT SCIATICA: ICD-10-CM

## 2022-08-15 DIAGNOSIS — M81.8 OTHER OSTEOPOROSIS WITHOUT CURRENT PATHOLOGICAL FRACTURE: ICD-10-CM

## 2022-08-15 DIAGNOSIS — D05.12 DUCTAL CARCINOMA IN SITU (DCIS) OF LEFT BREAST: ICD-10-CM

## 2022-08-15 DIAGNOSIS — D50.9 IRON DEFICIENCY ANEMIA, UNSPECIFIED IRON DEFICIENCY ANEMIA TYPE: ICD-10-CM

## 2022-08-15 DIAGNOSIS — C77.3 SECONDARY AND UNSPECIFIED MALIGNANT NEOPLASM OF AXILLA AND UPPER LIMB LYMPH NODES: ICD-10-CM

## 2022-08-15 LAB
ALBUMIN SERPL BCP-MCNC: 3.5 G/DL (ref 3.5–5.2)
ALP SERPL-CCNC: 140 U/L (ref 55–135)
ALT SERPL W/O P-5'-P-CCNC: 25 U/L (ref 10–44)
ANION GAP SERPL CALC-SCNC: 13 MMOL/L (ref 8–16)
AST SERPL-CCNC: 33 U/L (ref 10–40)
BASOPHILS # BLD AUTO: 0.02 K/UL (ref 0–0.2)
BASOPHILS NFR BLD: 0.5 % (ref 0–1.9)
BILIRUB SERPL-MCNC: 0.4 MG/DL (ref 0.1–1)
BUN SERPL-MCNC: 48 MG/DL (ref 8–23)
CALCIUM SERPL-MCNC: 8.8 MG/DL (ref 8.7–10.5)
CHLORIDE SERPL-SCNC: 105 MMOL/L (ref 95–110)
CO2 SERPL-SCNC: 23 MMOL/L (ref 23–29)
CREAT SERPL-MCNC: 2.7 MG/DL (ref 0.5–1.4)
DIFFERENTIAL METHOD: ABNORMAL
EOSINOPHIL # BLD AUTO: 0.2 K/UL (ref 0–0.5)
EOSINOPHIL NFR BLD: 4.7 % (ref 0–8)
ERYTHROCYTE [DISTWIDTH] IN BLOOD BY AUTOMATED COUNT: 15.9 % (ref 11.5–14.5)
EST. GFR  (NO RACE VARIABLE): 19 ML/MIN/1.73 M^2
GLUCOSE SERPL-MCNC: 99 MG/DL (ref 70–110)
HCT VFR BLD AUTO: 31.1 % (ref 37–48.5)
HGB BLD-MCNC: 9.4 G/DL (ref 12–16)
IMM GRANULOCYTES # BLD AUTO: 0.02 K/UL (ref 0–0.04)
IMM GRANULOCYTES NFR BLD AUTO: 0.5 % (ref 0–0.5)
LYMPHOCYTES # BLD AUTO: 0.7 K/UL (ref 1–4.8)
LYMPHOCYTES NFR BLD: 18.9 % (ref 18–48)
MCH RBC QN AUTO: 27 PG (ref 27–31)
MCHC RBC AUTO-ENTMCNC: 30.2 G/DL (ref 32–36)
MCV RBC AUTO: 89 FL (ref 82–98)
MONOCYTES # BLD AUTO: 0.5 K/UL (ref 0.3–1)
MONOCYTES NFR BLD: 14 % (ref 4–15)
NEUTROPHILS # BLD AUTO: 2.4 K/UL (ref 1.8–7.7)
NEUTROPHILS NFR BLD: 61.4 % (ref 38–73)
NRBC BLD-RTO: 0 /100 WBC
PLATELET # BLD AUTO: 207 K/UL (ref 150–450)
PMV BLD AUTO: 8.8 FL (ref 9.2–12.9)
POTASSIUM SERPL-SCNC: 5.2 MMOL/L (ref 3.5–5.1)
PROT SERPL-MCNC: 7.9 G/DL (ref 6–8.4)
RBC # BLD AUTO: 3.48 M/UL (ref 4–5.4)
SODIUM SERPL-SCNC: 141 MMOL/L (ref 136–145)
WBC # BLD AUTO: 3.87 K/UL (ref 3.9–12.7)

## 2022-08-15 PROCEDURE — 1157F ADVNC CARE PLAN IN RCRD: CPT | Mod: CPTII,S$GLB,,

## 2022-08-15 PROCEDURE — 3066F NEPHROPATHY DOC TX: CPT | Mod: CPTII,S$GLB,,

## 2022-08-15 PROCEDURE — 36415 COLL VENOUS BLD VENIPUNCTURE: CPT | Performed by: INTERNAL MEDICINE

## 2022-08-15 PROCEDURE — 84466 ASSAY OF TRANSFERRIN: CPT | Performed by: NURSE PRACTITIONER

## 2022-08-15 PROCEDURE — 1101F PR PT FALLS ASSESS DOC 0-1 FALLS W/OUT INJ PAST YR: ICD-10-PCS | Mod: CPTII,S$GLB,,

## 2022-08-15 PROCEDURE — 63600175 PHARM REV CODE 636 W HCPCS: Mod: JG

## 2022-08-15 PROCEDURE — 99999 PR PBB SHADOW E&M-EST. PATIENT-LVL IV: CPT | Mod: PBBFAC,,,

## 2022-08-15 PROCEDURE — 99214 OFFICE O/P EST MOD 30 MIN: CPT | Mod: S$GLB,,,

## 2022-08-15 PROCEDURE — 80053 COMPREHEN METABOLIC PANEL: CPT | Performed by: INTERNAL MEDICINE

## 2022-08-15 PROCEDURE — 1125F PR PAIN SEVERITY QUANTIFIED, PAIN PRESENT: ICD-10-PCS | Mod: CPTII,S$GLB,,

## 2022-08-15 PROCEDURE — 85025 COMPLETE CBC W/AUTO DIFF WBC: CPT | Performed by: INTERNAL MEDICINE

## 2022-08-15 PROCEDURE — 99214 PR OFFICE/OUTPT VISIT, EST, LEVL IV, 30-39 MIN: ICD-10-PCS | Mod: S$GLB,,,

## 2022-08-15 PROCEDURE — 3008F BODY MASS INDEX DOCD: CPT | Mod: CPTII,S$GLB,,

## 2022-08-15 PROCEDURE — 1125F AMNT PAIN NOTED PAIN PRSNT: CPT | Mod: CPTII,S$GLB,,

## 2022-08-15 PROCEDURE — 1101F PT FALLS ASSESS-DOCD LE1/YR: CPT | Mod: CPTII,S$GLB,,

## 2022-08-15 PROCEDURE — 3077F SYST BP >= 140 MM HG: CPT | Mod: CPTII,S$GLB,,

## 2022-08-15 PROCEDURE — 96372 THER/PROPH/DIAG INJ SC/IM: CPT

## 2022-08-15 PROCEDURE — 3079F DIAST BP 80-89 MM HG: CPT | Mod: CPTII,S$GLB,,

## 2022-08-15 PROCEDURE — 82728 ASSAY OF FERRITIN: CPT | Performed by: NURSE PRACTITIONER

## 2022-08-15 PROCEDURE — 1157F PR ADVANCE CARE PLAN OR EQUIV PRESENT IN MEDICAL RECORD: ICD-10-PCS | Mod: CPTII,S$GLB,,

## 2022-08-15 PROCEDURE — 4010F PR ACE/ARB THEARPY RXD/TAKEN: ICD-10-PCS | Mod: CPTII,S$GLB,,

## 2022-08-15 PROCEDURE — 3288F FALL RISK ASSESSMENT DOCD: CPT | Mod: CPTII,S$GLB,,

## 2022-08-15 PROCEDURE — 99999 PR PBB SHADOW E&M-EST. PATIENT-LVL IV: ICD-10-PCS | Mod: PBBFAC,,,

## 2022-08-15 PROCEDURE — 3077F PR MOST RECENT SYSTOLIC BLOOD PRESSURE >= 140 MM HG: ICD-10-PCS | Mod: CPTII,S$GLB,,

## 2022-08-15 PROCEDURE — 4010F ACE/ARB THERAPY RXD/TAKEN: CPT | Mod: CPTII,S$GLB,,

## 2022-08-15 PROCEDURE — 3288F PR FALLS RISK ASSESSMENT DOCUMENTED: ICD-10-PCS | Mod: CPTII,S$GLB,,

## 2022-08-15 PROCEDURE — 3008F PR BODY MASS INDEX (BMI) DOCUMENTED: ICD-10-PCS | Mod: CPTII,S$GLB,,

## 2022-08-15 PROCEDURE — 3066F PR DOCUMENTATION OF TREATMENT FOR NEPHROPATHY: ICD-10-PCS | Mod: CPTII,S$GLB,,

## 2022-08-15 PROCEDURE — 3079F PR MOST RECENT DIASTOLIC BLOOD PRESSURE 80-89 MM HG: ICD-10-PCS | Mod: CPTII,S$GLB,,

## 2022-08-15 RX ADMIN — EPOETIN ALFA-EPBX 20000 UNITS: 40000 INJECTION, SOLUTION INTRAVENOUS; SUBCUTANEOUS at 10:08

## 2022-08-16 ENCOUNTER — OFFICE VISIT (OUTPATIENT)
Dept: NEPHROLOGY | Facility: CLINIC | Age: 67
End: 2022-08-16
Payer: MEDICARE

## 2022-08-16 ENCOUNTER — CLINICAL SUPPORT (OUTPATIENT)
Dept: REHABILITATION | Facility: HOSPITAL | Age: 67
End: 2022-08-16
Payer: MEDICARE

## 2022-08-16 VITALS
BODY MASS INDEX: 31.77 KG/M2 | HEIGHT: 62 IN | DIASTOLIC BLOOD PRESSURE: 94 MMHG | WEIGHT: 172.63 LBS | HEART RATE: 93 BPM | SYSTOLIC BLOOD PRESSURE: 158 MMHG | RESPIRATION RATE: 18 BRPM

## 2022-08-16 DIAGNOSIS — Z74.09 IMPAIRED MOBILITY: Primary | ICD-10-CM

## 2022-08-16 DIAGNOSIS — I12.9 PARENCHYMAL RENAL HYPERTENSION, STAGE 1 THROUGH STAGE 4 OR UNSPECIFIED CHRONIC KIDNEY DISEASE: ICD-10-CM

## 2022-08-16 DIAGNOSIS — E87.5 HYPERKALEMIA: ICD-10-CM

## 2022-08-16 DIAGNOSIS — N18.4 CKD (CHRONIC KIDNEY DISEASE) STAGE 4, GFR 15-29 ML/MIN: Primary | ICD-10-CM

## 2022-08-16 LAB
FERRITIN SERPL-MCNC: 168 NG/ML (ref 20–300)
IRON SERPL-MCNC: 61 UG/DL (ref 30–160)
SATURATED IRON: 20 % (ref 20–50)
TOTAL IRON BINDING CAPACITY: 305 UG/DL (ref 250–450)
TRANSFERRIN SERPL-MCNC: 206 MG/DL (ref 200–375)

## 2022-08-16 PROCEDURE — 3080F DIAST BP >= 90 MM HG: CPT | Mod: CPTII,S$GLB,, | Performed by: INTERNAL MEDICINE

## 2022-08-16 PROCEDURE — 99999 PR PBB SHADOW E&M-EST. PATIENT-LVL IV: ICD-10-PCS | Mod: PBBFAC,,, | Performed by: INTERNAL MEDICINE

## 2022-08-16 PROCEDURE — 3008F PR BODY MASS INDEX (BMI) DOCUMENTED: ICD-10-PCS | Mod: CPTII,S$GLB,, | Performed by: INTERNAL MEDICINE

## 2022-08-16 PROCEDURE — 1157F ADVNC CARE PLAN IN RCRD: CPT | Mod: CPTII,S$GLB,, | Performed by: INTERNAL MEDICINE

## 2022-08-16 PROCEDURE — 1159F PR MEDICATION LIST DOCUMENTED IN MEDICAL RECORD: ICD-10-PCS | Mod: CPTII,S$GLB,, | Performed by: INTERNAL MEDICINE

## 2022-08-16 PROCEDURE — 1101F PR PT FALLS ASSESS DOC 0-1 FALLS W/OUT INJ PAST YR: ICD-10-PCS | Mod: CPTII,S$GLB,, | Performed by: INTERNAL MEDICINE

## 2022-08-16 PROCEDURE — 3077F SYST BP >= 140 MM HG: CPT | Mod: CPTII,S$GLB,, | Performed by: INTERNAL MEDICINE

## 2022-08-16 PROCEDURE — 3066F NEPHROPATHY DOC TX: CPT | Mod: CPTII,S$GLB,, | Performed by: INTERNAL MEDICINE

## 2022-08-16 PROCEDURE — 3066F PR DOCUMENTATION OF TREATMENT FOR NEPHROPATHY: ICD-10-PCS | Mod: CPTII,S$GLB,, | Performed by: INTERNAL MEDICINE

## 2022-08-16 PROCEDURE — 99214 OFFICE O/P EST MOD 30 MIN: CPT | Mod: S$GLB,,, | Performed by: INTERNAL MEDICINE

## 2022-08-16 PROCEDURE — 3080F PR MOST RECENT DIASTOLIC BLOOD PRESSURE >= 90 MM HG: ICD-10-PCS | Mod: CPTII,S$GLB,, | Performed by: INTERNAL MEDICINE

## 2022-08-16 PROCEDURE — 4010F PR ACE/ARB THEARPY RXD/TAKEN: ICD-10-PCS | Mod: CPTII,S$GLB,, | Performed by: INTERNAL MEDICINE

## 2022-08-16 PROCEDURE — 3288F PR FALLS RISK ASSESSMENT DOCUMENTED: ICD-10-PCS | Mod: CPTII,S$GLB,, | Performed by: INTERNAL MEDICINE

## 2022-08-16 PROCEDURE — 4010F ACE/ARB THERAPY RXD/TAKEN: CPT | Mod: CPTII,S$GLB,, | Performed by: INTERNAL MEDICINE

## 2022-08-16 PROCEDURE — 3077F PR MOST RECENT SYSTOLIC BLOOD PRESSURE >= 140 MM HG: ICD-10-PCS | Mod: CPTII,S$GLB,, | Performed by: INTERNAL MEDICINE

## 2022-08-16 PROCEDURE — 99214 PR OFFICE/OUTPT VISIT, EST, LEVL IV, 30-39 MIN: ICD-10-PCS | Mod: S$GLB,,, | Performed by: INTERNAL MEDICINE

## 2022-08-16 PROCEDURE — 99999 PR PBB SHADOW E&M-EST. PATIENT-LVL IV: CPT | Mod: PBBFAC,,, | Performed by: INTERNAL MEDICINE

## 2022-08-16 PROCEDURE — 1159F MED LIST DOCD IN RCRD: CPT | Mod: CPTII,S$GLB,, | Performed by: INTERNAL MEDICINE

## 2022-08-16 PROCEDURE — 1160F PR REVIEW ALL MEDS BY PRESCRIBER/CLIN PHARMACIST DOCUMENTED: ICD-10-PCS | Mod: CPTII,S$GLB,, | Performed by: INTERNAL MEDICINE

## 2022-08-16 PROCEDURE — 1101F PT FALLS ASSESS-DOCD LE1/YR: CPT | Mod: CPTII,S$GLB,, | Performed by: INTERNAL MEDICINE

## 2022-08-16 PROCEDURE — 1160F RVW MEDS BY RX/DR IN RCRD: CPT | Mod: CPTII,S$GLB,, | Performed by: INTERNAL MEDICINE

## 2022-08-16 PROCEDURE — 3008F BODY MASS INDEX DOCD: CPT | Mod: CPTII,S$GLB,, | Performed by: INTERNAL MEDICINE

## 2022-08-16 PROCEDURE — 1157F PR ADVANCE CARE PLAN OR EQUIV PRESENT IN MEDICAL RECORD: ICD-10-PCS | Mod: CPTII,S$GLB,, | Performed by: INTERNAL MEDICINE

## 2022-08-16 PROCEDURE — 3288F FALL RISK ASSESSMENT DOCD: CPT | Mod: CPTII,S$GLB,, | Performed by: INTERNAL MEDICINE

## 2022-08-16 PROCEDURE — 1125F PR PAIN SEVERITY QUANTIFIED, PAIN PRESENT: ICD-10-PCS | Mod: CPTII,S$GLB,, | Performed by: INTERNAL MEDICINE

## 2022-08-16 PROCEDURE — 97110 THERAPEUTIC EXERCISES: CPT | Mod: CQ

## 2022-08-16 PROCEDURE — 1125F AMNT PAIN NOTED PAIN PRSNT: CPT | Mod: CPTII,S$GLB,, | Performed by: INTERNAL MEDICINE

## 2022-08-16 RX ORDER — CALCITONIN SALMON 200 [IU]/.09ML
1 SPRAY, METERED NASAL DAILY
Qty: 3.7 ML | Refills: 2 | Status: SHIPPED | OUTPATIENT
Start: 2022-08-16 | End: 2022-11-07 | Stop reason: SDUPTHER

## 2022-08-16 NOTE — PROGRESS NOTES
"Subjective:       Patient ID: Nadia Damon is a 67 y.o. female.    Chief Complaint:  CKD 4    HPI    She presents to clinic today for routine follow-up.  In the clinic today she relates that she is planning to have spinal injections done in the next couple of weeks for chronic back pain.  She wants to make sure that it is okay to have this procedure done given the fact that she has stage 4 chronic kidney disease.  We discussed that these procedures very safe and should have no effect on her kidneys.  She does not use nonsteroidal anti-inflammatory agents.  Her laboratory studies and medications were reviewed.  All Nephrology related questions were answered to her satisfaction.      Review of Systems   Constitutional: Negative.    HENT: Negative.    Eyes: Negative.    Respiratory: Negative.    Cardiovascular: Negative.    Gastrointestinal: Negative.    Genitourinary: Negative.    Musculoskeletal: Positive for back pain.   Skin: Negative.    Neurological: Negative.    Hematological: Negative.          BP (!) 158/94   Pulse 93   Resp 18   Ht 5' 2" (1.575 m)   Wt 78.3 kg (172 lb 9.9 oz)   LMP 06/20/1983 (Within Years)   BMI 31.57 kg/m²     Lab Results   Component Value Date    WBC 3.87 (L) 08/15/2022    HGB 9.4 (L) 08/15/2022    HCT 31.1 (L) 08/15/2022    MCV 89 08/15/2022     08/15/2022      BMP  Lab Results   Component Value Date     08/15/2022    K 5.2 (H) 08/15/2022     08/15/2022    CO2 23 08/15/2022    BUN 48 (H) 08/15/2022    CREATININE 2.7 (H) 08/15/2022    CALCIUM 8.8 08/15/2022    ANIONGAP 13 08/15/2022    ESTGFRAFRICA 19 (A) 07/14/2022    EGFRNONAA 17 (A) 07/14/2022     CMP  Sodium   Date Value Ref Range Status   08/15/2022 141 136 - 145 mmol/L Final     Potassium   Date Value Ref Range Status   08/15/2022 5.2 (H) 3.5 - 5.1 mmol/L Final     Chloride   Date Value Ref Range Status   08/15/2022 105 95 - 110 mmol/L Final     CO2   Date Value Ref Range Status   08/15/2022 23 23 - 29 " mmol/L Final     Glucose   Date Value Ref Range Status   08/15/2022 99 70 - 110 mg/dL Final     BUN   Date Value Ref Range Status   08/15/2022 48 (H) 8 - 23 mg/dL Final     Creatinine   Date Value Ref Range Status   08/15/2022 2.7 (H) 0.5 - 1.4 mg/dL Final     Calcium   Date Value Ref Range Status   08/15/2022 8.8 8.7 - 10.5 mg/dL Final     Total Protein   Date Value Ref Range Status   08/15/2022 7.9 6.0 - 8.4 g/dL Final     Albumin   Date Value Ref Range Status   08/15/2022 3.5 3.5 - 5.2 g/dL Final     Total Bilirubin   Date Value Ref Range Status   08/15/2022 0.4 0.1 - 1.0 mg/dL Final     Comment:     For infants and newborns, interpretation of results should be based  on gestational age, weight and in agreement with clinical  observations.    Premature Infant recommended reference ranges:  Up to 24 hours.............<8.0 mg/dL  Up to 48 hours............<12.0 mg/dL  3-5 days..................<15.0 mg/dL  6-29 days.................<15.0 mg/dL       Alkaline Phosphatase   Date Value Ref Range Status   08/15/2022 140 (H) 55 - 135 U/L Final     AST   Date Value Ref Range Status   08/15/2022 33 10 - 40 U/L Final     ALT   Date Value Ref Range Status   08/15/2022 25 10 - 44 U/L Final     Anion Gap   Date Value Ref Range Status   08/15/2022 13 8 - 16 mmol/L Final     eGFR if    Date Value Ref Range Status   07/14/2022 19 (A) >60 mL/min/1.73 m^2 Final     eGFR if non    Date Value Ref Range Status   07/14/2022 17 (A) >60 mL/min/1.73 m^2 Final     Comment:     Calculation used to obtain the estimated glomerular filtration  rate (eGFR) is the CKD-EPI equation.        Current Outpatient Medications on File Prior to Visit   Medication Sig Dispense Refill    acetaminophen (TYLENOL) 325 MG tablet Take 1 tablet (325 mg total) by mouth every 6 (six) hours as needed for Pain.  0    amLODIPine (NORVASC) 2.5 MG tablet       aspirin (ECOTRIN) 81 MG EC tablet Take 1 tablet (81 mg total) by mouth  once daily. 90 tablet 3    azelastine (ASTELIN) 137 mcg (0.1 %) nasal spray 2 sprays (274 mcg total) by Nasal route 2 (two) times daily. 30 mL 6    biotin 10,000 mcg Cap Take 1 tablet by mouth once daily.      busPIRone (BUSPAR) 10 MG tablet Take 1 tablet (10 mg total) by mouth once daily. 90 tablet 3    calcitonin, salmon, (FORTICAL) 200 unit/actuation nasal spray 1 spray by Nasal route once daily. 3.7 mL 7    carvediloL (COREG) 6.25 MG tablet Take 1 tablet (6.25 mg total) by mouth 2 (two) times daily with meals. 180 tablet 3    cycloSPORINE modified, NEORAL, (NEORAL) 25 MG capsule TAKE 3 CAPSULES (75 MG TOTAL) BY MOUTH 2 (TWO) TIMES DAILY. 540 capsule 6    DULoxetine (CYMBALTA) 30 MG capsule TAKE 1 CAPSULE EVERY DAY 90 capsule 1    EVENING PRIMROSE OIL ORAL Take 1,000 mg by mouth once daily.      furosemide (LASIX) 20 MG tablet Take 1 tablet (20 mg total) by mouth 2 (two) times a day for 5 days, THEN 1 tablet (20 mg total) once daily. Take 1 tablet daily. 370 tablet 0    hydrALAZINE (APRESOLINE) 50 MG tablet Take 1 tablet (50 mg total) by mouth every 8 (eight) hours. TAKE 1 TABLETS  EVERY 8  HOURS. 270 tablet 3    losartan (COZAAR) 25 MG tablet Take 1 tablet (25 mg total) by mouth once daily. 90 tablet 3    methocarbamoL (ROBAXIN) 750 MG Tab Take 1 tablet (750 mg total) by mouth 2 (two) times daily as needed (Pain). 60 tablet 1    multivitamin capsule Take 1 capsule by mouth once daily.      ondansetron (ZOFRAN-ODT) 8 MG TbDL Take 1 tablet (8 mg total) by mouth every 8 (eight) hours as needed (nausea). 60 tablet 5    pantoprazole (PROTONIX) 40 MG tablet TAKE 1 TABLET EVERY DAY 90 tablet 1    pravastatin (PRAVACHOL) 20 MG tablet Take 1 tablet (20 mg total) by mouth once daily. 90 tablet 3    pregabalin (LYRICA) 50 MG capsule TAKE 1 CAPSULE 2 TIMES DAILY AT NOON AND NIGHT TIME 180 capsule 1    RETACRIT 20,000 unit/mL injection       temazepam (RESTORIL) 30 mg capsule Take 1 at bedtime 90 capsule 1     vitamin E 400 UNIT capsule Take 400 Units by mouth once daily.      zolpidem (AMBIEN) 10 mg Tab TAKE 1 TABLET BY MOUTH ONCE DAILY IN THE EVENING 90 tablet 1     Current Facility-Administered Medications on File Prior to Visit   Medication Dose Route Frequency Provider Last Rate Last Admin    denosumab (PROLIA) injection 60 mg  60 mg Subcutaneous Q6 Months Prasanth Johnson MD        [COMPLETED] epoetin tristan-epbx injection 20,000 Units  20,000 Units Subcutaneous 1 time in Clinic/HOD Kelsie Burk PA-C   20,000 Units at 08/15/22 1028    lactated ringers infusion   Intravenous Continuous Jennifer Carr MD 10 mL/hr at 11/09/21 0717 Restarted at 11/09/21 0820            Objective:            Physical Exam  Constitutional:       Appearance: Normal appearance.   HENT:      Head: Normocephalic and atraumatic.   Eyes:      General: No scleral icterus.     Extraocular Movements: Extraocular movements intact.      Pupils: Pupils are equal, round, and reactive to light.   Pulmonary:      Effort: Pulmonary effort is normal.      Breath sounds: No stridor.   Musculoskeletal:      Right lower leg: No edema.      Left lower leg: No edema.   Skin:     General: Skin is warm and dry.   Neurological:      General: No focal deficit present.      Mental Status: She is alert and oriented to person, place, and time.   Psychiatric:         Mood and Affect: Mood normal.         Behavior: Behavior normal.         Assessment:       1. CKD (chronic kidney disease) stage 4, GFR 15-29 ml/min    2. Parenchymal renal hypertension, stage 1 through stage 4 or unspecified chronic kidney disease      3. Hyperkalemia   Plan:       1. Creatinine has remained relatively stable ranging between 2.4 and 2.8 for the past 4 months.  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  She is status post heart transplant in 1993.    She is clear from a Nephrology standpoint for her planned spinal injections.    2. Blood pressure was  mildly elevated in the clinic today.  She states she just took her blood pressure medications prior to arrival.  Additionally she is having quite a bit of back pain.      3. Potassium is mildly elevated at 5.2.  We discussed strategies for decreasing potassium in her diet.  A list of high potassium foods was provided.      Gunner Melgar MD

## 2022-08-16 NOTE — PRE-PROCEDURE INSTRUCTIONS
Spoke with patient regarding procedure scheduled on 8.22    Arrival time 1120    Has patient been sick with fever or on antibiotics within the last 7 days? No    Does the patient have any open wounds, sores or rashes? No    Does the patient have any recent fractures? no    Has patient received a vaccination within the last 7 days? No    Received the COVID vaccination? yes    Has the patient stopped all medications as directed?     Does patient have a pacemaker and or defibrillator? no    Does the patient have a ride to and from procedure and someone reliable to remain with patient? Andra     Is the patient diabetic? no    Does the patient have sleep apnea? Or use O2 at home? No and no     Is the patient receiving sedation? yes    Is the patient instructed to remain NPO beginning at midnight the night before their procedure? yes    Procedure location confirmed with patient? Yes    Covid- Denies signs/symptoms. Instructed to notify PAT/MD if any changes.

## 2022-08-16 NOTE — PROGRESS NOTES
[facility]   Physical Therapist Assistant Treatment Note     Name: Nadia Damon  Clinic Number: [id number]    Therapy Diagnosis:   Encounter Diagnosis   Name Primary?    Impaired mobility Yes     Physician: Luz Dickson NP    Visit Date: 8/16/2022    Physician Orders: PT Eval and Treat   Medical Diagnosis from Referral: SI pain  Evaluation Date: 7/19/2022  Authorization Period Expiration: 12/31/2023  Plan of Care Expiration: 10/17/2022  Progress Note Due: 10 visits or 8/18/2022  Visit # / Visits authorized:  7/20 + eval     FOTO  Number Date Score    1 7/19/2022 46%   2       3       4             Precautions: cancer, standard      PTA Visit #:  1/5     Time In:1:38  Time Out: 2:57  Total Billable Time: 53 minutes    SUBJECTIVE     Pt reports: Right back pain down right leg; injections scheduled for next Monday  She was compliant with home exercise program.  Response to previous treatment: no issues  Functional change: N/A at this time    Pain: 7/10  Location: right back down leg    OBJECTIVE     Objective Measures updated at progress report unless specified.     Treatment     Nadia received the treatments listed below:      therapeutic exercises to develop strength, flexibility and core stabilization for 53 minutes including:  Nustep for mobility  5 min  Seated R sciatic nerve glides   Assisted METs   Hook lying gentle rocking  Supine pelvic tilts   Supine dynamic HSS  Bridges over ball   R single leg roll over ( Feldenkrais bridge )  Single knee to chest  Standing hip extension  Standing hip abduction  Supine piriformis stretch      DEFERRED:  Wall squats with ball 2x10  Resisted hip flexion    Attempted manual STM however patient unable to tolerate at this time.     Moist heat at end of session to low back x 10 minutes      Patient Education and Home Exercises     Home Exercises Provided and Patient Education Provided     Education provided:   - Home program    Written Home  Exercises Provided: Patient instructed to cont prior HEP. Exercises were reviewed and Nadia was able to demonstrate them prior to the end of the session.  Nadia demonstrated good  understanding of the education provided. See EMR under Patient Instructions for exercises provided during therapy sessions    ASSESSMENT     Nadia presented to therapy today with significant pain in her right low back, SIJ, glutes and hamstrings. She is very tender to light palpation and does not tolerate manual therapy techniques. Transitioning from sit to and from supine is also very painful for Nadia. Today, METs were performed due to mild anterior pelvic obliquity on the right. Continued with therex and mobility with low reps for improved tolerance as this is limited right now. She did report mild pain relief end of session although still significant. Continued skilled intervention indicated to decrease pain and increase mobility and strength to tolerate her daily activities.   Nadia Is progressing well towards her goals.   Pt prognosis is Good.     Pt will continue to benefit from skilled outpatient physical therapy to address the deficits listed in the problem list box on initial evaluation, provide pt/family education and to maximize pt's level of independence in the home and community environment.     Pt's spiritual, cultural and educational needs considered and pt agreeable to plan of care and goals.     Anticipated barriers to physical therapy: none    Goals: Short Term Goals: In 4 weeks   1. I with HEP  2. Pt to increase lumbar ROM from knees to mid shin    3. Pt to have pain less than 6/10 at all times.  4. Pt will improve FOTO disability score to 40% disability or less in order to improve overall QOL and return to PLOF.       Long Term Goals: In 8 weeks  1.  Pt will improve FOTO disability score to 35% disability or less in order to improve overall QOL and return to PLOF.    2.  Patient to demo increase in LE strength to  4/5  3.  Patient to have decreased pain to 3/10 at all times.  4.  Patient to demo increase lumbar ROM to 90% flexion  5.  Patient to perform daily activities including walking without limitation.    PLAN     Plan of care Certification: 7/19/2022 to 10/17/2022.     Outpatient Physical Therapy 2 times weekly for 8 weeks to include the following interventions: Electrical Stimulation as needed, Gait Training, Manual Therapy, Moist Heat/ Ice, Neuromuscular Re-ed, Patient Education, Therapeutic Activities, Therapeutic Exercise and dry needling.     Isabel Thomas, PTA

## 2022-08-18 ENCOUNTER — CLINICAL SUPPORT (OUTPATIENT)
Dept: REHABILITATION | Facility: HOSPITAL | Age: 67
End: 2022-08-18
Payer: MEDICARE

## 2022-08-18 DIAGNOSIS — Z74.09 IMPAIRED MOBILITY: Primary | ICD-10-CM

## 2022-08-18 PROCEDURE — 97110 THERAPEUTIC EXERCISES: CPT

## 2022-08-18 NOTE — PROGRESS NOTES
[facility]   Physical Therapist Treatment Note     Name: Nadia Damon  Clinic Number: [id number]    Therapy Diagnosis:   No diagnosis found.  Physician: Luz Dickson NP    Visit Date: 8/18/2022    Physician Orders: PT Eval and Treat   Medical Diagnosis from Referral: SI pain  Evaluation Date: 7/19/2022  Authorization Period Expiration: 12/31/2023  Plan of Care Expiration: 10/17/2022  Progress Note Due: 10 visits or 8/18/2022  Visit # / Visits authorized:  7/20 + eval     FOTO  Number Date Score    1 7/19/2022 46%   2       3       4             Precautions: cancer, standard      PTA Visit #:  1/5     Time In:1:15  Time Out: 2:00  Total Billable Time: 40 minutes    SUBJECTIVE     Pt reports:Injections scheduled for next Monday  She was compliant with home exercise program.  Response to previous treatment: no issues  Functional change: N/A at this time    Pain: 4/10  Location: right back down leg    OBJECTIVE     Objective Measures updated at progress report unless specified.     Treatment     Nadia received the treatments listed below:      therapeutic exercises to develop strength, flexibility and core stabilization for 40 minutes including:  Nustep for mobility  5 min  Single lower extremity bridge 2x8  SI muscle energy techniques  Matrix extension 40# 2x10  Matrix rotations 0# 20  Standing hip extension  Standing cable rows 30#    DEFERRED:  Wall squats with ball 2x10  Resisted hip flexion    Attempted manual STM however patient unable to tolerate at this time.     Moist heat at end of session to low back x 10 minutes      Patient Education and Home Exercises     Home Exercises Provided and Patient Education Provided     Education provided:   - Home program    Written Home Exercises Provided: Patient instructed to cont prior HEP. Exercises were reviewed and Nadia was able to demonstrate them prior to the end of the session.  Nadia demonstrated good  understanding of the  education provided. See EMR under Patient Instructions for exercises provided during therapy sessions    ASSESSMENT     Nadia presented to therapy with decreased pain.  She was started on SI muscle energy activity and Med Ex low back strengthening  Nadia Is progressing well towards her goals.   Pt prognosis is Good.     Pt will continue to benefit from skilled outpatient physical therapy to address the deficits listed in the problem list box on initial evaluation, provide pt/family education and to maximize pt's level of independence in the home and community environment.     Pt's spiritual, cultural and educational needs considered and pt agreeable to plan of care and goals.     Anticipated barriers to physical therapy: none    Goals: Short Term Goals: In 4 weeks   1. I with HEP  2. Pt to increase lumbar ROM from knees to mid shin    3. Pt to have pain less than 6/10 at all times.  4. Pt will improve FOTO disability score to 40% disability or less in order to improve overall QOL and return to PLOF.       Long Term Goals: In 8 weeks  1.  Pt will improve FOTO disability score to 35% disability or less in order to improve overall QOL and return to PLOF.    2.  Patient to demo increase in LE strength to 4/5  3.  Patient to have decreased pain to 3/10 at all times.  4.  Patient to demo increase lumbar ROM to 90% flexion  5.  Patient to perform daily activities including walking without limitation.    PLAN     Plan of care Certification: 7/19/2022 to 10/17/2022.     Outpatient Physical Therapy 2 times weekly for 8 weeks to include the following interventions: Electrical Stimulation as needed, Gait Training, Manual Therapy, Moist Heat/ Ice, Neuromuscular Re-ed, Patient Education, Therapeutic Activities, Therapeutic Exercise and dry needling.     Omar Perez, PT

## 2022-08-22 ENCOUNTER — HOSPITAL ENCOUNTER (OUTPATIENT)
Facility: HOSPITAL | Age: 67
Discharge: HOME | End: 2022-08-22
Attending: ANESTHESIOLOGY | Admitting: ANESTHESIOLOGY
Payer: MEDICARE

## 2022-08-22 VITALS
OXYGEN SATURATION: 97 % | DIASTOLIC BLOOD PRESSURE: 77 MMHG | TEMPERATURE: 98 F | WEIGHT: 170 LBS | HEIGHT: 62 IN | BODY MASS INDEX: 31.28 KG/M2 | RESPIRATION RATE: 20 BRPM | SYSTOLIC BLOOD PRESSURE: 143 MMHG | HEART RATE: 86 BPM

## 2022-08-22 DIAGNOSIS — M53.3 SACROILIAC JOINT PAIN: Primary | ICD-10-CM

## 2022-08-22 DIAGNOSIS — M47.816 LUMBAR SPONDYLOSIS: ICD-10-CM

## 2022-08-22 PROCEDURE — 25000003 PHARM REV CODE 250: Performed by: ANESTHESIOLOGY

## 2022-08-22 PROCEDURE — 25500020 PHARM REV CODE 255: Performed by: ANESTHESIOLOGY

## 2022-08-22 PROCEDURE — 64493 PR INJ DX/THER AGNT PARAVERT FACET JOINT,IMG GUIDE,LUMBAR/SAC,1ST LVL: ICD-10-PCS | Mod: RT,,, | Performed by: ANESTHESIOLOGY

## 2022-08-22 PROCEDURE — 27096 INJECT SACROILIAC JOINT: CPT | Performed by: ANESTHESIOLOGY

## 2022-08-22 PROCEDURE — 64493 INJ PARAVERT F JNT L/S 1 LEV: CPT | Mod: RT,,, | Performed by: ANESTHESIOLOGY

## 2022-08-22 PROCEDURE — 27096 PR INJECTION,SACROILIAC JOINT: ICD-10-PCS | Mod: 59,RT,, | Performed by: ANESTHESIOLOGY

## 2022-08-22 PROCEDURE — 63600175 PHARM REV CODE 636 W HCPCS: Performed by: ANESTHESIOLOGY

## 2022-08-22 PROCEDURE — 64493 INJ PARAVERT F JNT L/S 1 LEV: CPT | Performed by: ANESTHESIOLOGY

## 2022-08-22 PROCEDURE — 27096 INJECT SACROILIAC JOINT: CPT | Mod: 59,RT,, | Performed by: ANESTHESIOLOGY

## 2022-08-22 RX ORDER — MIDAZOLAM HYDROCHLORIDE 1 MG/ML
INJECTION, SOLUTION INTRAMUSCULAR; INTRAVENOUS
Status: DISCONTINUED | OUTPATIENT
Start: 2022-08-22 | End: 2022-08-22 | Stop reason: HOSPADM

## 2022-08-22 RX ORDER — METHYLPREDNISOLONE ACETATE 40 MG/ML
INJECTION, SUSPENSION INTRA-ARTICULAR; INTRALESIONAL; INTRAMUSCULAR; SOFT TISSUE
Status: DISCONTINUED | OUTPATIENT
Start: 2022-08-22 | End: 2022-08-22 | Stop reason: HOSPADM

## 2022-08-22 RX ORDER — ONDANSETRON 2 MG/ML
4 INJECTION INTRAMUSCULAR; INTRAVENOUS ONCE AS NEEDED
Status: COMPLETED | OUTPATIENT
Start: 2022-08-22 | End: 2022-08-22

## 2022-08-22 RX ORDER — FENTANYL CITRATE 50 UG/ML
INJECTION, SOLUTION INTRAMUSCULAR; INTRAVENOUS
Status: DISCONTINUED | OUTPATIENT
Start: 2022-08-22 | End: 2022-08-22 | Stop reason: HOSPADM

## 2022-08-22 RX ORDER — LIDOCAINE HYDROCHLORIDE 10 MG/ML
INJECTION, SOLUTION EPIDURAL; INFILTRATION; INTRACAUDAL; PERINEURAL
Status: DISCONTINUED | OUTPATIENT
Start: 2022-08-22 | End: 2022-08-22 | Stop reason: HOSPADM

## 2022-08-22 RX ADMIN — ONDANSETRON HYDROCHLORIDE 4 MG: 2 SOLUTION INTRAMUSCULAR; INTRAVENOUS at 01:08

## 2022-08-22 NOTE — DISCHARGE SUMMARY
Discharge Note  Short Stay      SUMMARY     Admit Date: 8/22/2022    Attending Physician: Jassi Pierre MD        Discharge Physician: Jassi Pierre MD        Discharge Date: 8/22/2022 1:01 PM    Procedure(s) (LRB):  Right SIJ Injection Right L5/S1 Facte Injection (Right)    Final Diagnosis: Lumbar spondylosis [M47.816]  Sacroiliitis [M46.1]    Disposition: Home or self care    Patient Instructions:   Current Discharge Medication List      CONTINUE these medications which have NOT CHANGED    Details   amLODIPine (NORVASC) 2.5 MG tablet       aspirin (ECOTRIN) 81 MG EC tablet Take 1 tablet (81 mg total) by mouth once daily.  Qty: 90 tablet, Refills: 3      busPIRone (BUSPAR) 10 MG tablet Take 1 tablet (10 mg total) by mouth once daily.  Qty: 90 tablet, Refills: 3    Associated Diagnoses: Anxiety      carvediloL (COREG) 6.25 MG tablet Take 1 tablet (6.25 mg total) by mouth 2 (two) times daily with meals.  Qty: 180 tablet, Refills: 3      cycloSPORINE modified, NEORAL, (NEORAL) 25 MG capsule TAKE 3 CAPSULES (75 MG TOTAL) BY MOUTH 2 (TWO) TIMES DAILY.  Qty: 540 capsule, Refills: 6    Associated Diagnoses: Heart transplanted      DULoxetine (CYMBALTA) 30 MG capsule TAKE 1 CAPSULE EVERY DAY  Qty: 90 capsule, Refills: 1    Associated Diagnoses: Fibromyalgia      EVENING PRIMROSE OIL ORAL Take 1,000 mg by mouth once daily.      furosemide (LASIX) 20 MG tablet Take 1 tablet (20 mg total) by mouth 2 (two) times a day for 5 days, THEN 1 tablet (20 mg total) once daily. Take 1 tablet daily.  Qty: 370 tablet, Refills: 0    Associated Diagnoses: Heart transplanted; Essential hypertension      hydrALAZINE (APRESOLINE) 50 MG tablet Take 1 tablet (50 mg total) by mouth every 8 (eight) hours. TAKE 1 TABLETS  EVERY 8  HOURS.  Qty: 270 tablet, Refills: 3    Associated Diagnoses: Essential hypertension      losartan (COZAAR) 25 MG tablet Take 1 tablet (25 mg total) by mouth once daily.  Qty: 90 tablet, Refills: 3    Comments:  .  Associated Diagnoses: Essential hypertension      methocarbamoL (ROBAXIN) 750 MG Tab Take 1 tablet (750 mg total) by mouth 2 (two) times daily as needed (Pain).  Qty: 60 tablet, Refills: 1    Associated Diagnoses: Lumbar spondylosis; Sacroiliitis      pravastatin (PRAVACHOL) 20 MG tablet Take 1 tablet (20 mg total) by mouth every evening.  Qty: 90 tablet, Refills: 4      pregabalin (LYRICA) 50 MG capsule TAKE 1 CAPSULE 2 TIMES DAILY AT NOON AND NIGHT TIME  Qty: 180 capsule, Refills: 1    Associated Diagnoses: Fibromyalgia      RETACRIT 20,000 unit/mL injection       temazepam (RESTORIL) 30 mg capsule Take 1 at bedtime  Qty: 90 capsule, Refills: 1    Associated Diagnoses: Insomnia, unspecified type      acetaminophen (TYLENOL) 325 MG tablet Take 1 tablet (325 mg total) by mouth every 6 (six) hours as needed for Pain.  Refills: 0      azelastine (ASTELIN) 137 mcg (0.1 %) nasal spray 2 sprays (274 mcg total) by Nasal route 2 (two) times daily.  Qty: 30 mL, Refills: 6    Associated Diagnoses: Allergic rhinitis, unspecified seasonality, unspecified trigger      biotin 10,000 mcg Cap Take 1 tablet by mouth once daily.      calcitonin, salmon, (FORTICAL) 200 unit/actuation nasal spray 1 spray by Nasal route once daily. Alternate nostrils - give with calcium, vitamin D  Qty: 3.7 mL, Refills: 2    Comments: NEW RX REQUEST FOR PATIENT DUE TO USING NEW MAIL ORDER PHARMACY-[facility]      multivitamin capsule Take 1 capsule by mouth once daily.      ondansetron (ZOFRAN-ODT) 8 MG TbDL Take 1 tablet (8 mg total) by mouth every 8 (eight) hours as needed (nausea).  Qty: 60 tablet, Refills: 5    Associated Diagnoses: Ductal carcinoma in situ (DCIS) of left breast      pantoprazole (PROTONIX) 40 MG tablet TAKE 1 TABLET EVERY DAY  Qty: 90 tablet, Refills: 1    Associated Diagnoses: Gastroesophageal reflux disease, unspecified whether esophagitis present      vitamin E 400 UNIT capsule Take 400 Units by mouth once daily.       zolpidem (AMBIEN) 10 mg Tab TAKE 1 TABLET BY MOUTH ONCE DAILY IN THE EVENING  Qty: 90 tablet, Refills: 1    Associated Diagnoses: Primary insomnia                 Discharge Diagnosis: Lumbar spondylosis [M47.816]  Sacroiliitis [M46.1]  Condition on Discharge: Stable with no complications to procedure   Diet on Discharge: Same as before.  Activity: as per instruction sheet.  Discharge to: Home with a responsible adult.  Follow up: 2-4 weeks       Please call the office at [phone] if you experience any weakness or loss of sensation, fever > 101.5, pain uncontrolled with oral medications, persistent nausea/vomiting/or diarrhea, redness or drainage from the incisions, or any other worrisome concerns. If physician on call was not reached or could not communicate with our office for any reason please go to the nearest emergency department

## 2022-08-22 NOTE — OP NOTE
Sacroiliac Joint Injection under Fluoroscopy and L5/S1 Facet Injection    INFORMED CONSENT: The procedure, risks, benefits and options were discussed with patient. There are no contraindications to the procedure. The patient expressed understanding and agreed to proceed. The personnel performing the procedure was discussed.    Date of procedure 08/22/2022    Time-out taken to identify patient and procedure side prior to starting the procedure.                                                                  PROCEDURE:  right sacroiliac joint injection under fluoroscopy and Right L5/S1 Facet Injection    Pre Procedure diagnosis:    Lumbar spondylosis [M47.816]  Sacroiliitis [M46.1]  1. Lumbar spondylosis        Post-Procedure diagnosis:   same    PHYSICIAN: Jassi Pierre MD    ASSISTANTS: None    MEDICATIONS INJECTED: 1ML Depo-Medrol 40mg and 3ml of Lidocaine PF 1%     LOCAL ANESTHETIC USED: 3ml Lidocaine 1%      Sedation: Conscious sedation provided by M.D    SEDATION MEDICATIONS: local/IV sedation: Versed 2 mg and fentanyl 100 mcg IV.  Conscious sedation ordered by MD.  Patient reevaluated and sedation administered by MD and monitored by RN.  Total sedation time was less than 15 min.    Total sedation time was >10 but <20 min    ESTIMATED BLOOD LOSS:  None.    COMPLICATIONS:  None.    Right Sacroiliac Joint Injection TECHNIQUE:   Laying in the prone position, the patient was prepped and draped in the usual sterile fashion using ChloraPrep and fenestrated drape.  The area was determined under fluoroscopy.  Local Xylocaine was injected by raising a wheel and going down to the periosteum using a 27-gauge hypodermic needle.  The 3.5 inch 22-gauge spinal needle was introduced into the right sacroiliac joint.  Negative pressure applied to confirm no intravascular placement.  Omnipaque was injected to confirm placement and to confirm that there was no vascular runoff.  The medication was then injected slowly.  The  patient tolerated the procedure well.      Right L5/S1 Facet Injection TECHNIQUE:   Lying in a prone position, the patient was prepped and draped in the usual sterile fashion using ChloraPrep and fenestrated drape.  The level was determined under fluoroscopic guidance.  Local anesthetic was given by going down to the hub of the 27-gauge 1.25in needle and raising a wheel.  The 3.5in 22-gauge needle was introduced into all levels as stated above facet joints.  Negative pressure applied to make sure that there was no intravascular placement.  Omnipaque was injected to confirm placement.  It was also done to confirm that there was no vascular runoff.  Medication was then injected slowly.  The patient tolerated the procedure well.      The patient was monitored after the procedure.  Patient was given post procedure and discharge instructions to follow at home.  We will see the patient back in two weeks or the patient may call to inform of status. The patient was discharged in a stable condition

## 2022-08-22 NOTE — DISCHARGE SUMMARY
Discharge Note  Short Stay      SUMMARY     Admit Date: 8/22/2022    Attending Physician: Jassi Pierre MD        Discharge Physician: Jassi Pierre MD        Discharge Date: 8/22/2022 1:02 PM    Procedure(s) (LRB):  Right SIJ Injection Right L5/S1 Facte Injection (Right)    Final Diagnosis: Lumbar spondylosis [M47.816]  Sacroiliitis [M46.1]    Disposition: Home or self care    Patient Instructions:   Current Discharge Medication List      CONTINUE these medications which have NOT CHANGED    Details   amLODIPine (NORVASC) 2.5 MG tablet       aspirin (ECOTRIN) 81 MG EC tablet Take 1 tablet (81 mg total) by mouth once daily.  Qty: 90 tablet, Refills: 3      busPIRone (BUSPAR) 10 MG tablet Take 1 tablet (10 mg total) by mouth once daily.  Qty: 90 tablet, Refills: 3    Associated Diagnoses: Anxiety      carvediloL (COREG) 6.25 MG tablet Take 1 tablet (6.25 mg total) by mouth 2 (two) times daily with meals.  Qty: 180 tablet, Refills: 3      cycloSPORINE modified, NEORAL, (NEORAL) 25 MG capsule TAKE 3 CAPSULES (75 MG TOTAL) BY MOUTH 2 (TWO) TIMES DAILY.  Qty: 540 capsule, Refills: 6    Associated Diagnoses: Heart transplanted      DULoxetine (CYMBALTA) 30 MG capsule TAKE 1 CAPSULE EVERY DAY  Qty: 90 capsule, Refills: 1    Associated Diagnoses: Fibromyalgia      EVENING PRIMROSE OIL ORAL Take 1,000 mg by mouth once daily.      furosemide (LASIX) 20 MG tablet Take 1 tablet (20 mg total) by mouth 2 (two) times a day for 5 days, THEN 1 tablet (20 mg total) once daily. Take 1 tablet daily.  Qty: 370 tablet, Refills: 0    Associated Diagnoses: Heart transplanted; Essential hypertension      hydrALAZINE (APRESOLINE) 50 MG tablet Take 1 tablet (50 mg total) by mouth every 8 (eight) hours. TAKE 1 TABLETS  EVERY 8  HOURS.  Qty: 270 tablet, Refills: 3    Associated Diagnoses: Essential hypertension      losartan (COZAAR) 25 MG tablet Take 1 tablet (25 mg total) by mouth once daily.  Qty: 90 tablet, Refills: 3    Comments:  .  Associated Diagnoses: Essential hypertension      methocarbamoL (ROBAXIN) 750 MG Tab Take 1 tablet (750 mg total) by mouth 2 (two) times daily as needed (Pain).  Qty: 60 tablet, Refills: 1    Associated Diagnoses: Lumbar spondylosis; Sacroiliitis      pravastatin (PRAVACHOL) 20 MG tablet Take 1 tablet (20 mg total) by mouth every evening.  Qty: 90 tablet, Refills: 4      pregabalin (LYRICA) 50 MG capsule TAKE 1 CAPSULE 2 TIMES DAILY AT NOON AND NIGHT TIME  Qty: 180 capsule, Refills: 1    Associated Diagnoses: Fibromyalgia      RETACRIT 20,000 unit/mL injection       temazepam (RESTORIL) 30 mg capsule Take 1 at bedtime  Qty: 90 capsule, Refills: 1    Associated Diagnoses: Insomnia, unspecified type      acetaminophen (TYLENOL) 325 MG tablet Take 1 tablet (325 mg total) by mouth every 6 (six) hours as needed for Pain.  Refills: 0      azelastine (ASTELIN) 137 mcg (0.1 %) nasal spray 2 sprays (274 mcg total) by Nasal route 2 (two) times daily.  Qty: 30 mL, Refills: 6    Associated Diagnoses: Allergic rhinitis, unspecified seasonality, unspecified trigger      biotin 10,000 mcg Cap Take 1 tablet by mouth once daily.      calcitonin, salmon, (FORTICAL) 200 unit/actuation nasal spray 1 spray by Nasal route once daily. Alternate nostrils - give with calcium, vitamin D  Qty: 3.7 mL, Refills: 2    Comments: NEW RX REQUEST FOR PATIENT DUE TO USING NEW MAIL ORDER PHARMACY-[facility]      multivitamin capsule Take 1 capsule by mouth once daily.      ondansetron (ZOFRAN-ODT) 8 MG TbDL Take 1 tablet (8 mg total) by mouth every 8 (eight) hours as needed (nausea).  Qty: 60 tablet, Refills: 5    Associated Diagnoses: Ductal carcinoma in situ (DCIS) of left breast      pantoprazole (PROTONIX) 40 MG tablet TAKE 1 TABLET EVERY DAY  Qty: 90 tablet, Refills: 1    Associated Diagnoses: Gastroesophageal reflux disease, unspecified whether esophagitis present      vitamin E 400 UNIT capsule Take 400 Units by mouth once daily.       zolpidem (AMBIEN) 10 mg Tab TAKE 1 TABLET BY MOUTH ONCE DAILY IN THE EVENING  Qty: 90 tablet, Refills: 1    Associated Diagnoses: Primary insomnia                 Discharge Diagnosis: Lumbar spondylosis [M47.816]  Sacroiliitis [M46.1]  Condition on Discharge: Stable with no complications to procedure   Diet on Discharge: Same as before.  Activity: as per instruction sheet.  Discharge to: Home with a responsible adult.  Follow up: 2-4 weeks       Please call the office at [phone] if you experience any weakness or loss of sensation, fever > 101.5, pain uncontrolled with oral medications, persistent nausea/vomiting/or diarrhea, redness or drainage from the incisions, or any other worrisome concerns. If physician on call was not reached or could not communicate with our office for any reason please go to the nearest emergency department

## 2022-08-22 NOTE — DISCHARGE INSTRUCTIONS

## 2022-08-23 ENCOUNTER — TELEPHONE (OUTPATIENT)
Dept: ADMINISTRATIVE | Facility: HOSPITAL | Age: 67
End: 2022-08-23
Payer: MEDICARE

## 2022-08-24 ENCOUNTER — CLINICAL SUPPORT (OUTPATIENT)
Dept: REHABILITATION | Facility: HOSPITAL | Age: 67
End: 2022-08-24
Payer: MEDICARE

## 2022-08-24 DIAGNOSIS — Z74.09 IMPAIRED MOBILITY: Primary | ICD-10-CM

## 2022-08-24 PROCEDURE — 97110 THERAPEUTIC EXERCISES: CPT

## 2022-08-24 NOTE — PROGRESS NOTES
[facility] OUTPATIENT THERAPY AND WELLNESS   Physical Therapist Treatment Note     Name: Nadia Damon  Clinic Number: [id number]    Therapy Diagnosis:   No diagnosis found.  Physician: Luz Dickson NP    Visit Date: 8/24/2022    Physician Orders: PT Eval and Treat   Medical Diagnosis from Referral: SI pain  Evaluation Date: 7/19/2022  Authorization Period Expiration: 12/31/2023  Plan of Care Expiration: 10/17/2022  Progress Note Due: 10 visits or 8/18/2022  Visit # / Visits authorized:  8/20 + eval     FOTO  Number Date Score    1 7/19/2022 46%   2       3       4             Precautions: cancer, standard      PTA Visit #:  1/5     Time In:1:15  Time Out: 2:00  Total Billable Time: 40 minutes    SUBJECTIVE     Pt reports:no pain after injection  She was compliant with home exercise program.  Response to previous treatment: no issues  Functional change: movement without pain    Pain: 0/10  Location: right back down leg    OBJECTIVE     Objective Measures updated at progress report unless specified.     Treatment     Nadia received the treatments listed below:      therapeutic exercises to develop strength, flexibility and core stabilization for 40 minutes including:  Nustep for mobility  5 min  Single lower extremity bridge 2x8  Isometric hip adduction with ball  Matrix extension 40# 2x10  Matrix rotations 0# 20  Standing hip extension  Standing cable rows 30#  Wall squats with ball 2x10        Patient Education and Home Exercises     Home Exercises Provided and Patient Education Provided     Education provided:   - Home program    Written Home Exercises Provided: Patient instructed to cont prior HEP. Exercises were reviewed and Nadia was able to demonstrate them prior to the end of the session.  Nadia demonstrated good  understanding of the education provided. See EMR under Patient Instructions for exercises provided during therapy sessions    ASSESSMENT     Nadia presented to therapy without pain after  injection.  She progressed strengthening and range of motion exercises.  Nadia Is progressing well towards her goals.   Pt prognosis is Good.     Pt will continue to benefit from skilled outpatient physical therapy to address the deficits listed in the problem list box on initial evaluation, provide pt/family education and to maximize pt's level of independence in the home and community environment.     Pt's spiritual, cultural and educational needs considered and pt agreeable to plan of care and goals.     Anticipated barriers to physical therapy: none    Goals: Short Term Goals: In 4 weeks   1. I with HEP  2. Pt to increase lumbar ROM from knees to mid shin    3. Pt to have pain less than 6/10 at all times.  4. Pt will improve FOTO disability score to 40% disability or less in order to improve overall QOL and return to PLOF.       Long Term Goals: In 8 weeks  1.  Pt will improve FOTO disability score to 35% disability or less in order to improve overall QOL and return to PLOF.    2.  Patient to demo increase in LE strength to 4/5  3.  Patient to have decreased pain to 3/10 at all times.  4.  Patient to demo increase lumbar ROM to 90% flexion  5.  Patient to perform daily activities including walking without limitation.    PLAN     Plan of care Certification: 7/19/2022 to 10/17/2022.     Outpatient Physical Therapy 2 times weekly for 8 weeks to include the following interventions: Electrical Stimulation as needed, Gait Training, Manual Therapy, Moist Heat/ Ice, Neuromuscular Re-ed, Patient Education, Therapeutic Activities, Therapeutic Exercise and dry needling.     Omar Perez, PT

## 2022-08-30 ENCOUNTER — CLINICAL SUPPORT (OUTPATIENT)
Dept: REHABILITATION | Facility: HOSPITAL | Age: 67
End: 2022-08-30
Payer: MEDICARE

## 2022-08-30 DIAGNOSIS — Z74.09 IMPAIRED MOBILITY: Primary | ICD-10-CM

## 2022-08-30 PROCEDURE — 97110 THERAPEUTIC EXERCISES: CPT

## 2022-08-30 NOTE — PROGRESS NOTES
[facility]   Physical Therapist Treatment and Progress Note     Name: Nadia Damon  Clinic Number: [id number]    Therapy Diagnosis:   Encounter Diagnosis   Name Primary?    Impaired mobility Yes     Physician: Luz Dickson NP    Visit Date: 8/30/2022    Physician Orders: PT Eval and Treat   Medical Diagnosis from Referral: SI pain  Evaluation Date: 7/19/2022  Authorization Period Expiration: 12/31/2023  Plan of Care Expiration: 10/17/2022  Progress Note Due: 20 visits or 9/17/2022  Visit # / Visits authorized:  10/20 + eval     FOTO  Number Date Score    1 7/19/2022 46%   2       3       4             Precautions: cancer, standard      PTA Visit #:  1/5     Time In: 2:15  Time Out: 2:55  Total Billable Time: 35 minutes    SUBJECTIVE     Pt reports:mid back pain  She was compliant with home exercise program.  Response to previous treatment: no issues  Functional change: movement without pain    Pain: 0/10  Location: right back down leg    OBJECTIVE     Objective Measures updated at progress report unless specified.     Treatment     Nadia received the treatments listed below:      therapeutic exercises to develop strength, flexibility and core stabilization for 40 minutes including:  Nustep for mobility  5 min  Single lower extremity bridge 2x8  Isometric hip adduction with ball  Matrix extension 40# 2x10  Matrix rotations 0# 20  Standing hip extension  Standing cable rows 30#  Wall squats with ball 2x10  Book openers     Patient Education and Home Exercises     Home Exercises Provided and Patient Education Provided     Education provided:   - Home program    Written Home Exercises Provided: Patient instructed to cont prior HEP. Exercises were reviewed and Nadia was able to demonstrate them prior to the end of the session.  Nadia demonstrated good  understanding of the education provided. See EMR under Patient Instructions for exercises provided during therapy  sessions    ASSESSMENT     Nadia presented to therapy without pain after injection.  She progressed strengthening and range of motion exercises.  She has complaints of thoracic back pain today  Nadia Is progressing well towards her goals.   Pt prognosis is Good.     Pt will continue to benefit from skilled outpatient physical therapy to address the deficits listed in the problem list box on initial evaluation, provide pt/family education and to maximize pt's level of independence in the home and community environment.     Pt's spiritual, cultural and educational needs considered and pt agreeable to plan of care and goals.     Anticipated barriers to physical therapy: none    Goals: Short Term Goals: In 4 weeks   1. I with HEP  2. Pt to increase lumbar ROM from knees to mid shin  (Goal met 8/30/3022)  3. Pt to have pain less than 6/10 at all times.  (Goal met 8/30/3022)  4. Pt will improve FOTO disability score to 40% disability or less in order to improve overall QOL and return to PLOF.       Long Term Goals: In 8 weeks  1.  Pt will improve FOTO disability score to 35% disability or less in order to improve overall QOL and return to PLOF.    2.  Patient to demo increase in LE strength to 4/5  (Goal met 8/30/3022)  3.  Patient to have decreased pain to 3/10 at all times.  4.  Patient to demo increase lumbar ROM to 90% flexion  5.  Patient to perform daily activities including walking without limitation.    PLAN     Plan of care Certification: 7/19/2022 to 10/17/2022.     Outpatient Physical Therapy 2 times weekly for 8 weeks to include the following interventions: Electrical Stimulation as needed, Gait Training, Manual Therapy, Moist Heat/ Ice, Neuromuscular Re-ed, Patient Education, Therapeutic Activities, Therapeutic Exercise and dry needling.     Omar Perez, PT

## 2022-09-01 ENCOUNTER — CLINICAL SUPPORT (OUTPATIENT)
Dept: REHABILITATION | Facility: HOSPITAL | Age: 67
End: 2022-09-01
Payer: MEDICARE

## 2022-09-01 ENCOUNTER — LAB VISIT (OUTPATIENT)
Dept: LAB | Facility: HOSPITAL | Age: 67
End: 2022-09-01
Attending: NURSE PRACTITIONER
Payer: MEDICARE

## 2022-09-01 ENCOUNTER — OFFICE VISIT (OUTPATIENT)
Dept: PRIMARY CARE CLINIC | Facility: CLINIC | Age: 67
End: 2022-09-01
Payer: MEDICARE

## 2022-09-01 VITALS
WEIGHT: 170.88 LBS | BODY MASS INDEX: 31.25 KG/M2 | DIASTOLIC BLOOD PRESSURE: 86 MMHG | HEART RATE: 95 BPM | SYSTOLIC BLOOD PRESSURE: 130 MMHG | TEMPERATURE: 98 F | OXYGEN SATURATION: 97 %

## 2022-09-01 DIAGNOSIS — Z79.899 IMMUNOSUPPRESSION DUE TO DRUG THERAPY: ICD-10-CM

## 2022-09-01 DIAGNOSIS — M81.0 OSTEOPOROSIS, POST-MENOPAUSAL: ICD-10-CM

## 2022-09-01 DIAGNOSIS — E83.42 HYPOMAGNESEMIA: ICD-10-CM

## 2022-09-01 DIAGNOSIS — D84.821 IMMUNOSUPPRESSION DUE TO DRUG THERAPY: ICD-10-CM

## 2022-09-01 DIAGNOSIS — I10 ESSENTIAL HYPERTENSION: ICD-10-CM

## 2022-09-01 DIAGNOSIS — R59.1 LYMPHADENOPATHY: Primary | ICD-10-CM

## 2022-09-01 DIAGNOSIS — G47.00 INSOMNIA, UNSPECIFIED TYPE: ICD-10-CM

## 2022-09-01 DIAGNOSIS — M79.10 MYALGIA: ICD-10-CM

## 2022-09-01 DIAGNOSIS — R94.6 ABNORMAL THYROID FUNCTION TEST: ICD-10-CM

## 2022-09-01 DIAGNOSIS — N18.4 CKD (CHRONIC KIDNEY DISEASE) STAGE 4, GFR 15-29 ML/MIN: ICD-10-CM

## 2022-09-01 DIAGNOSIS — Z74.09 IMPAIRED MOBILITY: Primary | ICD-10-CM

## 2022-09-01 DIAGNOSIS — M47.816 LUMBAR SPONDYLOSIS: ICD-10-CM

## 2022-09-01 DIAGNOSIS — M46.1 SACROILIITIS: ICD-10-CM

## 2022-09-01 DIAGNOSIS — C77.3 SECONDARY AND UNSPECIFIED MALIGNANT NEOPLASM OF AXILLA AND UPPER LIMB LYMPH NODES: ICD-10-CM

## 2022-09-01 DIAGNOSIS — Z00.00 ENCOUNTER FOR PREVENTIVE HEALTH EXAMINATION: ICD-10-CM

## 2022-09-01 PROBLEM — A41.9 SEVERE SEPSIS: Status: RESOLVED | Noted: 2021-11-22 | Resolved: 2022-09-01

## 2022-09-01 PROBLEM — R92.8 ABNORMAL MAMMOGRAM: Status: RESOLVED | Noted: 2021-10-12 | Resolved: 2022-09-01

## 2022-09-01 PROBLEM — A04.72 C. DIFFICILE COLITIS: Status: RESOLVED | Noted: 2021-11-29 | Resolved: 2022-09-01

## 2022-09-01 PROBLEM — S92.425D CLOSED NONDISPLACED FRACTURE OF DISTAL PHALANX OF LEFT GREAT TOE WITH ROUTINE HEALING: Status: RESOLVED | Noted: 2018-10-22 | Resolved: 2022-09-01

## 2022-09-01 PROBLEM — R65.20 SEVERE SEPSIS: Status: RESOLVED | Noted: 2021-11-22 | Resolved: 2022-09-01

## 2022-09-01 PROBLEM — N30.90 CYSTITIS: Status: RESOLVED | Noted: 2022-05-10 | Resolved: 2022-09-01

## 2022-09-01 LAB
ALBUMIN SERPL BCP-MCNC: 3.6 G/DL (ref 3.5–5.2)
ALP SERPL-CCNC: 137 U/L (ref 55–135)
ALT SERPL W/O P-5'-P-CCNC: 30 U/L (ref 10–44)
ANION GAP SERPL CALC-SCNC: 11 MMOL/L (ref 8–16)
AST SERPL-CCNC: 34 U/L (ref 10–40)
BILIRUB SERPL-MCNC: 0.7 MG/DL (ref 0.1–1)
BUN SERPL-MCNC: 43 MG/DL (ref 8–23)
CALCIUM SERPL-MCNC: 8.9 MG/DL (ref 8.7–10.5)
CHLORIDE SERPL-SCNC: 111 MMOL/L (ref 95–110)
CK SERPL-CCNC: 427 U/L (ref 20–180)
CO2 SERPL-SCNC: 22 MMOL/L (ref 23–29)
CREAT SERPL-MCNC: 2.3 MG/DL (ref 0.5–1.4)
EST. GFR  (NO RACE VARIABLE): 22.6 ML/MIN/1.73 M^2
GLUCOSE SERPL-MCNC: 90 MG/DL (ref 70–110)
MAGNESIUM SERPL-MCNC: 2 MG/DL (ref 1.6–2.6)
POTASSIUM SERPL-SCNC: 5.8 MMOL/L (ref 3.5–5.1)
PROT SERPL-MCNC: 7.8 G/DL (ref 6–8.4)
SODIUM SERPL-SCNC: 144 MMOL/L (ref 136–145)

## 2022-09-01 PROCEDURE — 3066F PR DOCUMENTATION OF TREATMENT FOR NEPHROPATHY: ICD-10-PCS | Mod: CPTII,S$GLB,, | Performed by: NURSE PRACTITIONER

## 2022-09-01 PROCEDURE — 4010F PR ACE/ARB THEARPY RXD/TAKEN: ICD-10-PCS | Mod: CPTII,S$GLB,, | Performed by: NURSE PRACTITIONER

## 2022-09-01 PROCEDURE — 1101F PR PT FALLS ASSESS DOC 0-1 FALLS W/OUT INJ PAST YR: ICD-10-PCS | Mod: CPTII,S$GLB,, | Performed by: NURSE PRACTITIONER

## 2022-09-01 PROCEDURE — 1125F AMNT PAIN NOTED PAIN PRSNT: CPT | Mod: CPTII,S$GLB,, | Performed by: NURSE PRACTITIONER

## 2022-09-01 PROCEDURE — 3075F PR MOST RECENT SYSTOLIC BLOOD PRESS GE 130-139MM HG: ICD-10-PCS | Mod: CPTII,S$GLB,, | Performed by: NURSE PRACTITIONER

## 2022-09-01 PROCEDURE — 1159F PR MEDICATION LIST DOCUMENTED IN MEDICAL RECORD: ICD-10-PCS | Mod: CPTII,S$GLB,, | Performed by: NURSE PRACTITIONER

## 2022-09-01 PROCEDURE — 83735 ASSAY OF MAGNESIUM: CPT | Performed by: NURSE PRACTITIONER

## 2022-09-01 PROCEDURE — 1157F ADVNC CARE PLAN IN RCRD: CPT | Mod: CPTII,S$GLB,, | Performed by: NURSE PRACTITIONER

## 2022-09-01 PROCEDURE — 80053 COMPREHEN METABOLIC PANEL: CPT | Performed by: NURSE PRACTITIONER

## 2022-09-01 PROCEDURE — 3066F NEPHROPATHY DOC TX: CPT | Mod: CPTII,S$GLB,, | Performed by: NURSE PRACTITIONER

## 2022-09-01 PROCEDURE — 1125F PR PAIN SEVERITY QUANTIFIED, PAIN PRESENT: ICD-10-PCS | Mod: CPTII,S$GLB,, | Performed by: NURSE PRACTITIONER

## 2022-09-01 PROCEDURE — 99999 PR PBB SHADOW E&M-EST. PATIENT-LVL V: ICD-10-PCS | Mod: PBBFAC,,, | Performed by: NURSE PRACTITIONER

## 2022-09-01 PROCEDURE — 3288F PR FALLS RISK ASSESSMENT DOCUMENTED: ICD-10-PCS | Mod: CPTII,S$GLB,, | Performed by: NURSE PRACTITIONER

## 2022-09-01 PROCEDURE — G0439 PPPS, SUBSEQ VISIT: HCPCS | Mod: S$GLB,,, | Performed by: NURSE PRACTITIONER

## 2022-09-01 PROCEDURE — 3079F DIAST BP 80-89 MM HG: CPT | Mod: CPTII,S$GLB,, | Performed by: NURSE PRACTITIONER

## 2022-09-01 PROCEDURE — 1101F PT FALLS ASSESS-DOCD LE1/YR: CPT | Mod: CPTII,S$GLB,, | Performed by: NURSE PRACTITIONER

## 2022-09-01 PROCEDURE — 3008F BODY MASS INDEX DOCD: CPT | Mod: CPTII,S$GLB,, | Performed by: NURSE PRACTITIONER

## 2022-09-01 PROCEDURE — 4010F ACE/ARB THERAPY RXD/TAKEN: CPT | Mod: CPTII,S$GLB,, | Performed by: NURSE PRACTITIONER

## 2022-09-01 PROCEDURE — 1159F MED LIST DOCD IN RCRD: CPT | Mod: CPTII,S$GLB,, | Performed by: NURSE PRACTITIONER

## 2022-09-01 PROCEDURE — 3075F SYST BP GE 130 - 139MM HG: CPT | Mod: CPTII,S$GLB,, | Performed by: NURSE PRACTITIONER

## 2022-09-01 PROCEDURE — 99499 UNLISTED E&M SERVICE: CPT | Mod: HCNC,S$GLB,, | Performed by: NURSE PRACTITIONER

## 2022-09-01 PROCEDURE — 36415 COLL VENOUS BLD VENIPUNCTURE: CPT | Performed by: NURSE PRACTITIONER

## 2022-09-01 PROCEDURE — 1157F PR ADVANCE CARE PLAN OR EQUIV PRESENT IN MEDICAL RECORD: ICD-10-PCS | Mod: CPTII,S$GLB,, | Performed by: NURSE PRACTITIONER

## 2022-09-01 PROCEDURE — 1170F FXNL STATUS ASSESSED: CPT | Mod: CPTII,S$GLB,, | Performed by: NURSE PRACTITIONER

## 2022-09-01 PROCEDURE — 3008F PR BODY MASS INDEX (BMI) DOCUMENTED: ICD-10-PCS | Mod: CPTII,S$GLB,, | Performed by: NURSE PRACTITIONER

## 2022-09-01 PROCEDURE — 3079F PR MOST RECENT DIASTOLIC BLOOD PRESSURE 80-89 MM HG: ICD-10-PCS | Mod: CPTII,S$GLB,, | Performed by: NURSE PRACTITIONER

## 2022-09-01 PROCEDURE — 97110 THERAPEUTIC EXERCISES: CPT

## 2022-09-01 PROCEDURE — 82550 ASSAY OF CK (CPK): CPT | Performed by: NURSE PRACTITIONER

## 2022-09-01 PROCEDURE — G9920 SCRNING PERF AND NEGATIVE: HCPCS | Mod: CPTII,S$GLB,, | Performed by: NURSE PRACTITIONER

## 2022-09-01 PROCEDURE — 1170F PR FUNCTIONAL STATUS ASSESSED: ICD-10-PCS | Mod: CPTII,S$GLB,, | Performed by: NURSE PRACTITIONER

## 2022-09-01 PROCEDURE — 3288F FALL RISK ASSESSMENT DOCD: CPT | Mod: CPTII,S$GLB,, | Performed by: NURSE PRACTITIONER

## 2022-09-01 PROCEDURE — G0439 PR MEDICARE ANNUAL WELLNESS SUBSEQUENT VISIT: ICD-10-PCS | Mod: S$GLB,,, | Performed by: NURSE PRACTITIONER

## 2022-09-01 PROCEDURE — 99999 PR PBB SHADOW E&M-EST. PATIENT-LVL V: CPT | Mod: PBBFAC,,, | Performed by: NURSE PRACTITIONER

## 2022-09-01 PROCEDURE — G9920 PR SCREENING AND NEGATIVE: ICD-10-PCS | Mod: CPTII,S$GLB,, | Performed by: NURSE PRACTITIONER

## 2022-09-01 RX ORDER — METHOCARBAMOL 750 MG/1
1500 TABLET, FILM COATED ORAL 2 TIMES DAILY PRN
Qty: 60 TABLET | Refills: 1 | Status: SHIPPED | OUTPATIENT
Start: 2022-09-01 | End: 2022-09-30

## 2022-09-01 NOTE — ASSESSMENT & PLAN NOTE
BP Readings from Last 3 Encounters:   09/01/22 130/86   08/22/22 (!) 143/77   08/16/22 (!) 158/94   Controlled. Continue POC.

## 2022-09-01 NOTE — PATIENT INSTRUCTIONS
Counseling and Referral of Other Preventative  (Italic type indicates deductible and co-insurance are waived)    Patient Name: Nadia Damon  Today's Date: 9/1/2022    Health Maintenance       Date Due Completion Date    COVID-19 Vaccine (3 - Moderna risk series) 08/10/2022 7/13/2022    Influenza Vaccine (1) 09/01/2022 10/7/2021    Mammogram 05/16/2023 5/16/2022    Lipid Panel 06/14/2023 6/14/2022    Pneumococcal Vaccines (Age 65+) (3 - PPSV23 or PCV20) 10/22/2023 10/22/2018    DEXA Scan 08/03/2024 8/3/2021    Colorectal Cancer Screening 02/25/2026 2/25/2021    TETANUS VACCINE 09/09/2030 9/9/2020        Orders Placed This Encounter   Procedures    Mammo Digital Diagnostic Left with Iván    US Breast Left Limited    Comprehensive Metabolic Panel    Magnesium    CK     The following information is provided to all patients.  This information is to help you find resources for any of the problems found today that may be affecting your health:                Living healthy guide: www.[facility].[place].gov      Understanding Diabetes: www.diabetes.org      Eating healthy: www.cdc.gov/healthyweight      CDC home safety checklist: www.cdc.gov/steadi/patient.html      Agency on Aging: www.goea.[place].gov      Alcoholics anonymous (AA): www.aa.org      Physical Activity: www.thea.nih.gov/vy6iaid      Tobacco use: www.quitwithusla.org

## 2022-09-01 NOTE — PROGRESS NOTES
Dx mammogram with us scheduled for 9/19/22 at 10:00 O'Sukh, pt notified verbalized understanding.

## 2022-09-01 NOTE — PROGRESS NOTES
Nadia Damon presented for a follow-up Medicare AWV today. The following components were reviewed and updated:    Medical history  Family History  Social history  Allergies and Current Medications  Health Risk Assessment  Health Maintenance  Care Team    **See Completed Assessments for Annual Wellness visit with in the encounter summary    The following assessments were completed:  Depression Screening  Cognitive function Screening  Timed Get Up Test  Whisper Test  Nutrition Screening  PAQ    Dr Pierre following for right SI joint pain. Injection 8/22/22. Referred to PT for same.     Epogen inj 8/15/22. Less fatigue since.     Pain improved. Still some SI pain but no further Pain to buttock. Muscle relaxer ineffective.     Miralax is helping. BM twice daily, hard. Believes medication is constipating. Eating fruit.     Believes she feels lumps to left axilla, noted 2-3 weeks ago. Painful.    Sleeps well.     Some myalgias/pedal spasms x 2 days.       Advance Care Planning   Discussed ACP with pt. She values her independence. Wants to go peacefully when the time comes. Doesn't want to live on ventilator or have tube feedings if no reasonable chance of recovery. Reviewed pt's ACP documents with her. Confirmed her wishes remain unchanged.            Vitals:    09/01/22 1056   BP: 130/86   BP Location: Right arm   Patient Position: Sitting   BP Method: Medium (Manual)   Pulse: 95   Temp: 97.9 °F (36.6 °C)   TempSrc: Tympanic   SpO2: 97%   Weight: 77.5 kg (170 lb 13.7 oz)     Body mass index is 31.25 kg/m².   ]    Review of Systems   Constitutional:  Negative for activity change, appetite change, fatigue and fever.   Gastrointestinal:  Negative for abdominal pain, constipation and diarrhea.    Physical Exam  Constitutional:       General: She is not in acute distress.     Appearance: She is not ill-appearing.   HENT:      Nose: Nose normal.      Mouth/Throat:      Mouth: Mucous membranes are moist.   Eyes:       General: No scleral icterus.     Extraocular Movements: Extraocular movements intact.   Cardiovascular:      Rate and Rhythm: Normal rate and regular rhythm.      Pulses: Normal pulses.      Heart sounds: Normal heart sounds.   Pulmonary:      Effort: Pulmonary effort is normal.      Breath sounds: Normal breath sounds.   Chest:       Abdominal:      General: Bowel sounds are normal.      Palpations: Abdomen is soft.      Tenderness: There is no abdominal tenderness.   Musculoskeletal:         General: Swelling (mild BLE) present.      Cervical back: Neck supple.   Lymphadenopathy:      Cervical: No cervical adenopathy.   Neurological:      Mental Status: She is alert.          Health Maintenance         Date Due Completion Date    COVID-19 Vaccine (3 - Moderna risk series) 08/10/2022 7/13/2022    Influenza Vaccine (1) 09/01/2022 10/7/2021    Mammogram 05/16/2023 5/16/2022    Lipid Panel 06/14/2023 6/14/2022    Pneumococcal Vaccines (Age 65+) (3 - PPSV23 or PCV20) 10/22/2023 10/22/2018    DEXA Scan 08/03/2024 8/3/2021    Colorectal Cancer Screening 02/25/2026 2/25/2021    TETANUS VACCINE 09/09/2030 9/9/2020            Diagnoses and health risks identified today and associated recommendations/orders:  Secondary and unspecified malignant neoplasm of axilla and upper limb lymph nodes  Few shoddy LNs to left axilla that are uncomfortable. Repeat diagnostic mammogram and U/S    Abnormal thyroid function test  Lab Results   Component Value Date    TSH 5.231 (H) 06/14/2022   Monitor.      Osteoporosis, post-menopausal  Followed by rheumatology    Insomnia  Discussed risks of polypharmacy, sleep aids. Does not want to attempt to wean, does not believe she would tolerate. Disc sleep hygiene.    Immunosuppression due to drug therapy  No sx of infection. Monitor.     Hypomagnesemia  Repeat Mg++ today. Myalgias and pedal spasms reported.    CKD (chronic kidney disease) stage 4, GFR 15-29 ml/min  Followed by nephrology. Monitor.  Renal dose medications.     Essential hypertension  BP Readings from Last 3 Encounters:   09/01/22 130/86   08/22/22 (!) 143/77   08/16/22 (!) 158/94   Controlled. Continue POC.         Provided Nadia with a 5-10 year written screening schedule and personal prevention plan. Recommendations were developed using the USPSTF age appropriate recommendations. Education, counseling, and referrals were provided as needed.  After Visit Summary printed and given to patient which includes a list of additional screenings\tests needed.    Follow up in about 6 weeks (around 10/13/2022).      Luz Dickson, ONELIA Dickson, EVELYNP-C  I offered to discuss advanced care planning, including how to pick a person who would make decisions for you if you were unable to make them for yourself, called a health care power of , and what kind of decisions you might make such as use of life sustaining treatments such as ventilators and tube feeding when faced with a life limiting illness recorded on a living will that they will need to know. (How you want to be cared for as you near the end of your natural life)     X Patient is interested in learning more about how to make advanced directives.  I provided them paperwork and offered to discuss this with them.

## 2022-09-02 ENCOUNTER — LAB VISIT (OUTPATIENT)
Dept: LAB | Facility: HOSPITAL | Age: 67
End: 2022-09-02
Attending: NURSE PRACTITIONER
Payer: MEDICARE

## 2022-09-02 ENCOUNTER — TELEPHONE (OUTPATIENT)
Dept: PRIMARY CARE CLINIC | Facility: CLINIC | Age: 67
End: 2022-09-02
Payer: MEDICARE

## 2022-09-02 DIAGNOSIS — I10 ESSENTIAL HYPERTENSION: Primary | ICD-10-CM

## 2022-09-02 DIAGNOSIS — E87.5 HYPERKALEMIA: Primary | ICD-10-CM

## 2022-09-02 DIAGNOSIS — E87.5 HYPERKALEMIA: ICD-10-CM

## 2022-09-02 DIAGNOSIS — M79.10 MYALGIA: ICD-10-CM

## 2022-09-02 LAB
ALBUMIN SERPL BCP-MCNC: 3.3 G/DL (ref 3.5–5.2)
ANION GAP SERPL CALC-SCNC: 11 MMOL/L (ref 8–16)
BUN SERPL-MCNC: 40 MG/DL (ref 8–23)
CALCIUM SERPL-MCNC: 8.6 MG/DL (ref 8.7–10.5)
CHLORIDE SERPL-SCNC: 109 MMOL/L (ref 95–110)
CO2 SERPL-SCNC: 21 MMOL/L (ref 23–29)
CREAT SERPL-MCNC: 2.2 MG/DL (ref 0.5–1.4)
EST. GFR  (NO RACE VARIABLE): 24 ML/MIN/1.73 M^2
GLUCOSE SERPL-MCNC: 91 MG/DL (ref 70–110)
PHOSPHATE SERPL-MCNC: 4.8 MG/DL (ref 2.7–4.5)
POTASSIUM SERPL-SCNC: 5.1 MMOL/L (ref 3.5–5.1)
SODIUM SERPL-SCNC: 141 MMOL/L (ref 136–145)

## 2022-09-02 PROCEDURE — 36415 COLL VENOUS BLD VENIPUNCTURE: CPT | Performed by: NURSE PRACTITIONER

## 2022-09-02 PROCEDURE — 80069 RENAL FUNCTION PANEL: CPT | Performed by: NURSE PRACTITIONER

## 2022-09-02 NOTE — TELEPHONE ENCOUNTER
----- Message from Luz Dickson NP sent at 9/2/2022  7:33 AM CDT -----  Potassium too high. Please repeat today and hold losartan. Also CK is elevated so hold pravastatin. Both of these may be contributing to muscle spasms.

## 2022-09-03 NOTE — PROGRESS NOTES
[facility]   Physical Therapist Treatment Note     Name: Nadia Damon  Clinic Number: [id number]    Therapy Diagnosis:   Encounter Diagnosis   Name Primary?    Impaired mobility Yes     Physician: Luz Dickson NP    Visit Date: 9/1/2022    Physician Orders: PT Eval and Treat   Medical Diagnosis from Referral: SI pain  Evaluation Date: 7/19/2022  Authorization Period Expiration: 12/31/2023  Plan of Care Expiration: 10/17/2022  Progress Note Due: 20 visits or 9/17/2022  Visit # / Visits authorized:  10/20 + eval     FOTO  Number Date Score    1 7/19/2022 46%   2       3       4             Precautions: cancer, standard      PTA Visit #:  1/5     Time In: 2:00  Time Out: 2:45  Total Billable Time: 40 minutes    SUBJECTIVE     Pt reports: upper low back pain  She was compliant with home exercise program.  Response to previous treatment: no issues  Functional change: movement without pain    Pain: 0/10  Location: right back down leg    OBJECTIVE     Objective Measures updated at progress report unless specified.     Treatment     Nadia received the treatments listed below:      therapeutic exercises to develop strength, flexibility and core stabilization for 40 minutes including:  Nustep for mobility  5 min  Single lower extremity bridge 2x8  Isometric hip adduction with ball  Matrix extension 40# 2x10  Matrix rotations 0# 20  Standing hip extension    Nadia received the following manual therapy techniques: Palpation of the lumbar spine to determine tenderness and  levels of pain.     Patient demonstrated appropriate response to Functional Dry Needling. good  rhythmical contractions observed with estim to treated muscle groups.     Bilateral lumbar paraspinals    Patient Education and Home Exercises     Home Exercises Provided and Patient Education Provided     Education provided:   - Home program    Written Home Exercises Provided: Patient instructed to cont prior HEP. Exercises were  reviewed and Nadia was able to demonstrate them prior to the end of the session.  Nadia demonstrated good  understanding of the education provided. See EMR under Patient Instructions for exercises provided during therapy sessions    ASSESSMENT     Nadia presented to therapy without pain after injection.  She progressed strengthening and range of motion exercises.  She has complaints of thoracic back pain today  Nadia Is progressing well towards her goals.   Pt prognosis is Good.     Pt will continue to benefit from skilled outpatient physical therapy to address the deficits listed in the problem list box on initial evaluation, provide pt/family education and to maximize pt's level of independence in the home and community environment.     Pt's spiritual, cultural and educational needs considered and pt agreeable to plan of care and goals.     Anticipated barriers to physical therapy: none    Goals: Short Term Goals: In 4 weeks   1. I with HEP  2. Pt to increase lumbar ROM from knees to mid shin  (Goal met 8/30/3022)  3. Pt to have pain less than 6/10 at all times.  (Goal met 8/30/3022)  4. Pt will improve FOTO disability score to 40% disability or less in order to improve overall QOL and return to PLOF.       Long Term Goals: In 8 weeks  1.  Pt will improve FOTO disability score to 35% disability or less in order to improve overall QOL and return to PLOF.    2.  Patient to demo increase in LE strength to 4/5  (Goal met 8/30/3022)  3.  Patient to have decreased pain to 3/10 at all times.  4.  Patient to demo increase lumbar ROM to 90% flexion  5.  Patient to perform daily activities including walking without limitation.    PLAN     Plan of care Certification: 7/19/2022 to 10/17/2022.     Outpatient Physical Therapy 2 times weekly for 8 weeks to include the following interventions: Electrical Stimulation as needed, Gait Training, Manual Therapy, Moist Heat/ Ice, Neuromuscular Re-ed, Patient Education, Therapeutic  Activities, Therapeutic Exercise and dry needling.     Omar Perez, PT

## 2022-09-08 ENCOUNTER — CLINICAL SUPPORT (OUTPATIENT)
Dept: REHABILITATION | Facility: HOSPITAL | Age: 67
End: 2022-09-08
Payer: MEDICARE

## 2022-09-08 DIAGNOSIS — Z74.09 IMPAIRED MOBILITY: Primary | ICD-10-CM

## 2022-09-08 PROCEDURE — 97140 MANUAL THERAPY 1/> REGIONS: CPT

## 2022-09-11 NOTE — PROGRESS NOTES
[facility]   Physical Therapist Treatment Note     Name: Nadia Damon  Clinic Number: [id number]    Therapy Diagnosis:   Encounter Diagnosis   Name Primary?    Impaired mobility Yes     Physician: Luz Dickson NP    Visit Date: 9/8/2022    Physician Orders: PT Eval and Treat   Medical Diagnosis from Referral: SI pain  Evaluation Date: 7/19/2022  Authorization Period Expiration: 12/31/2023  Plan of Care Expiration: 10/17/2022  Progress Note Due: 20 visits or 9/17/2022  Visit # / Visits authorized:  12/20 + eval     FOTO  Number Date Score    1 7/19/2022 46%   2       3       4             Precautions: cancer, standard      PTA Visit #:  0/5     Time In:  12:45  Time Out: 1:20  Total Billable Time: 35 minutes    SUBJECTIVE     Pt reports: upper low back pain  She was compliant with home exercise program.  Response to previous treatment: no issues  Functional change: movement without pain    Pain: 0/10  Location: right back down leg    OBJECTIVE     Objective Measures updated at progress report unless specified.     Treatment     Nadia received the treatments listed below:      therapeutic exercises to develop strength, flexibility and core stabilization for 40 minutes including:  Nustep for mobility  5 min  Single lower extremity bridge 2x8  Isometric hip adduction with ball  Matrix extension 40# 2x10  Matrix rotations 0# 20  Standing hip extension    The patient received STM to the right lateral lumbar musculature followed by moist heat    Patient Education and Home Exercises     Home Exercises Provided and Patient Education Provided     Education provided:   - Home program    Written Home Exercises Provided: Patient instructed to cont prior HEP. Exercises were reviewed and Nadia was able to demonstrate them prior to the end of the session.  Nadia demonstrated good  understanding of the education provided. See EMR under Patient Instructions for exercises provided during therapy  sessions    ASSESSMENT     Nadia with pain impairing mobility and slowing transfers.  Once she is up she states she is ok.   She has received DN, and SI muscle activity with minimal relief.   She did go with minimal pain after initial injection.   Nadia Is progressing well towards her goals.   Pt prognosis is Good.     Pt will continue to benefit from skilled outpatient physical therapy to address the deficits listed in the problem list box on initial evaluation, provide pt/family education and to maximize pt's level of independence in the home and community environment.     Pt's spiritual, cultural and educational needs considered and pt agreeable to plan of care and goals.     Anticipated barriers to physical therapy: none    Goals: Short Term Goals: In 4 weeks   1. I with HEP  2. Pt to increase lumbar ROM from knees to mid shin  (Goal met 8/30/3022)  3. Pt to have pain less than 6/10 at all times.  (Goal met 8/30/3022)  4. Pt will improve FOTO disability score to 40% disability or less in order to improve overall QOL and return to PLOF.       Long Term Goals: In 8 weeks  1.  Pt will improve FOTO disability score to 35% disability or less in order to improve overall QOL and return to PLOF.    2.  Patient to demo increase in LE strength to 4/5  (Goal met 8/30/3022)  3.  Patient to have decreased pain to 3/10 at all times.  4.  Patient to demo increase lumbar ROM to 90% flexion  5.  Patient to perform daily activities including walking without limitation.    PLAN     Plan of care Certification: 7/19/2022 to 10/17/2022.     Outpatient Physical Therapy 2 times weekly for 8 weeks to include the following interventions: Electrical Stimulation as needed, Gait Training, Manual Therapy, Moist Heat/ Ice, Neuromuscular Re-ed, Patient Education, Therapeutic Activities, Therapeutic Exercise and dry needling.     Omar Perez, PT

## 2022-09-12 ENCOUNTER — INFUSION (OUTPATIENT)
Dept: INFUSION THERAPY | Facility: HOSPITAL | Age: 67
End: 2022-09-12
Attending: INTERNAL MEDICINE
Payer: MEDICARE

## 2022-09-12 ENCOUNTER — LAB VISIT (OUTPATIENT)
Dept: LAB | Facility: HOSPITAL | Age: 67
End: 2022-09-12
Attending: INTERNAL MEDICINE
Payer: MEDICARE

## 2022-09-12 VITALS
OXYGEN SATURATION: 98 % | DIASTOLIC BLOOD PRESSURE: 78 MMHG | SYSTOLIC BLOOD PRESSURE: 147 MMHG | HEART RATE: 93 BPM | TEMPERATURE: 98 F

## 2022-09-12 DIAGNOSIS — D50.9 IRON DEFICIENCY ANEMIA, UNSPECIFIED IRON DEFICIENCY ANEMIA TYPE: ICD-10-CM

## 2022-09-12 DIAGNOSIS — D63.1 ANEMIA ASSOCIATED WITH STAGE 4 CHRONIC RENAL FAILURE: ICD-10-CM

## 2022-09-12 DIAGNOSIS — N18.4 ANEMIA ASSOCIATED WITH STAGE 4 CHRONIC RENAL FAILURE: Primary | ICD-10-CM

## 2022-09-12 DIAGNOSIS — D63.1 ANEMIA ASSOCIATED WITH STAGE 4 CHRONIC RENAL FAILURE: Primary | ICD-10-CM

## 2022-09-12 DIAGNOSIS — N18.4 ANEMIA ASSOCIATED WITH STAGE 4 CHRONIC RENAL FAILURE: ICD-10-CM

## 2022-09-12 DIAGNOSIS — D05.12 DUCTAL CARCINOMA IN SITU (DCIS) OF LEFT BREAST: ICD-10-CM

## 2022-09-12 DIAGNOSIS — M81.8 OTHER OSTEOPOROSIS WITHOUT CURRENT PATHOLOGICAL FRACTURE: ICD-10-CM

## 2022-09-12 LAB
BASOPHILS # BLD AUTO: 0.02 K/UL (ref 0–0.2)
BASOPHILS NFR BLD: 0.5 % (ref 0–1.9)
DIFFERENTIAL METHOD: ABNORMAL
EOSINOPHIL # BLD AUTO: 0.1 K/UL (ref 0–0.5)
EOSINOPHIL NFR BLD: 3.5 % (ref 0–8)
ERYTHROCYTE [DISTWIDTH] IN BLOOD BY AUTOMATED COUNT: 15.5 % (ref 11.5–14.5)
HCT VFR BLD AUTO: 31.2 % (ref 37–48.5)
HGB BLD-MCNC: 9.8 G/DL (ref 12–16)
IMM GRANULOCYTES # BLD AUTO: 0.02 K/UL (ref 0–0.04)
IMM GRANULOCYTES NFR BLD AUTO: 0.5 % (ref 0–0.5)
LYMPHOCYTES # BLD AUTO: 0.5 K/UL (ref 1–4.8)
LYMPHOCYTES NFR BLD: 14.7 % (ref 18–48)
MCH RBC QN AUTO: 27.7 PG (ref 27–31)
MCHC RBC AUTO-ENTMCNC: 31.4 G/DL (ref 32–36)
MCV RBC AUTO: 88 FL (ref 82–98)
MONOCYTES # BLD AUTO: 0.6 K/UL (ref 0.3–1)
MONOCYTES NFR BLD: 15 % (ref 4–15)
NEUTROPHILS # BLD AUTO: 2.4 K/UL (ref 1.8–7.7)
NEUTROPHILS NFR BLD: 65.8 % (ref 38–73)
NRBC BLD-RTO: 0 /100 WBC
PLATELET # BLD AUTO: 166 K/UL (ref 150–450)
PMV BLD AUTO: 9.6 FL (ref 9.2–12.9)
RBC # BLD AUTO: 3.54 M/UL (ref 4–5.4)
WBC # BLD AUTO: 3.67 K/UL (ref 3.9–12.7)

## 2022-09-12 PROCEDURE — 96372 THER/PROPH/DIAG INJ SC/IM: CPT

## 2022-09-12 PROCEDURE — 36415 COLL VENOUS BLD VENIPUNCTURE: CPT

## 2022-09-12 PROCEDURE — 85025 COMPLETE CBC W/AUTO DIFF WBC: CPT

## 2022-09-12 PROCEDURE — 63600175 PHARM REV CODE 636 W HCPCS: Mod: JG | Performed by: INTERNAL MEDICINE

## 2022-09-12 RX ADMIN — ERYTHROPOIETIN 20000 UNITS: 40000 INJECTION, SOLUTION INTRAVENOUS; SUBCUTANEOUS at 01:09

## 2022-09-16 DIAGNOSIS — M79.7 FIBROMYALGIA: ICD-10-CM

## 2022-09-16 RX ORDER — DULOXETIN HYDROCHLORIDE 30 MG/1
CAPSULE, DELAYED RELEASE ORAL
Qty: 14 CAPSULE | Refills: 1 | Status: SHIPPED | OUTPATIENT
Start: 2022-09-16 | End: 2022-09-19

## 2022-09-16 NOTE — TELEPHONE ENCOUNTER
----- Message from Kathy Calderon sent at 9/16/2022  9:55 AM CDT -----  Type:  RX Refill Request    Who Called: PT  Refill or New Rx:New Rx  RX Name and Strength:duloxetine 30 mg  How is the patient currently taking it? (ex. 1XDay):1XDay  Is this a 30 day or 90 day RX:?  Preferred Pharmacy with phone number:.  [id number] - [place], [place] - [address] [facility] & [address] [address]  Phone: [phone] Fax: [phone]  Local or Mail Order:local  Ordering Provider:Ms Dickson  Would the patient rather a call back or a response via [facility]? call  Best Call Back Number:[phone]   Additional Information: States she needs some, while she waits for her mail order to come in. [facility] [employer] will be giving us a call.     Thank you

## 2022-09-16 NOTE — TELEPHONE ENCOUNTER
----- Message from Ayanna Stein sent at 9/16/2022 10:12 AM CDT -----  Pt need a 14 day supply refill of DULoxetine (CYMBALTA) 30 MG capsule sent to the local pharmacy. Pt can be reached at [phone] (home)             [id number] - [place], [place] - [address] [address] [facility] [address] [address]  Phone: [phone] Fax: [phone]

## 2022-09-19 ENCOUNTER — TELEPHONE (OUTPATIENT)
Dept: PRIMARY CARE CLINIC | Facility: CLINIC | Age: 67
End: 2022-09-19
Payer: MEDICARE

## 2022-09-19 ENCOUNTER — HOSPITAL ENCOUNTER (OUTPATIENT)
Dept: RADIOLOGY | Facility: HOSPITAL | Age: 67
Discharge: HOME | End: 2022-09-19
Attending: NURSE PRACTITIONER
Payer: MEDICARE

## 2022-09-19 DIAGNOSIS — C77.3 SECONDARY AND UNSPECIFIED MALIGNANT NEOPLASM OF AXILLA AND UPPER LIMB LYMPH NODES: Primary | ICD-10-CM

## 2022-09-19 DIAGNOSIS — R59.1 LYMPHADENOPATHY: ICD-10-CM

## 2022-09-19 DIAGNOSIS — M79.7 FIBROMYALGIA: ICD-10-CM

## 2022-09-19 DIAGNOSIS — D05.12 DUCTAL CARCINOMA IN SITU (DCIS) OF LEFT BREAST: ICD-10-CM

## 2022-09-19 PROCEDURE — 77062 BREAST TOMOSYNTHESIS BI: CPT | Mod: 26,,, | Performed by: RADIOLOGY

## 2022-09-19 PROCEDURE — 76642 US BREAST BILATERAL LIMITED: ICD-10-PCS | Mod: 26,50,, | Performed by: RADIOLOGY

## 2022-09-19 PROCEDURE — 76642 ULTRASOUND BREAST LIMITED: CPT | Mod: TC,50

## 2022-09-19 PROCEDURE — 77066 DX MAMMO INCL CAD BI: CPT | Mod: TC

## 2022-09-19 PROCEDURE — 77062 MAMMO DIGITAL DIAGNOSTIC BILAT WITH TOMO: ICD-10-PCS | Mod: 26,,, | Performed by: RADIOLOGY

## 2022-09-19 PROCEDURE — 76642 ULTRASOUND BREAST LIMITED: CPT | Mod: 26,50,, | Performed by: RADIOLOGY

## 2022-09-19 PROCEDURE — 77066 MAMMO DIGITAL DIAGNOSTIC BILAT WITH TOMO: ICD-10-PCS | Mod: 26,,, | Performed by: RADIOLOGY

## 2022-09-19 PROCEDURE — 77066 DX MAMMO INCL CAD BI: CPT | Mod: 26,,, | Performed by: RADIOLOGY

## 2022-09-19 RX ORDER — DULOXETIN HYDROCHLORIDE 30 MG/1
CAPSULE, DELAYED RELEASE ORAL
Qty: 14 CAPSULE | Refills: 1 | Status: SHIPPED | OUTPATIENT
Start: 2022-09-19 | End: 2022-09-19 | Stop reason: SDUPTHER

## 2022-09-19 RX ORDER — DULOXETIN HYDROCHLORIDE 30 MG/1
CAPSULE, DELAYED RELEASE ORAL
Qty: 90 CAPSULE | Refills: 1 | Status: SHIPPED | OUTPATIENT
Start: 2022-09-19 | End: 2023-02-10

## 2022-09-19 NOTE — TELEPHONE ENCOUNTER
----- Message from Ayanna Stein sent at 9/19/2022  1:27 PM CDT -----  Pt states that that 14 day supply of DULoxetine (CYMBALTA) 30 MG capsule was suppose to be sent to the local pharmacy. Pt states that she has been out of this med for a week and need it sent to the pharmacy below. Pt would like the nurse to call her once med has been called in to the pharmacy.       [id number] - [address] & [address] [address]  Phone: [phone] Fax: [phone]        Pt can be reached at [phone] (home)

## 2022-09-20 ENCOUNTER — TELEPHONE (OUTPATIENT)
Dept: PRIMARY CARE CLINIC | Facility: CLINIC | Age: 67
End: 2022-09-20
Payer: MEDICARE

## 2022-09-20 NOTE — TELEPHONE ENCOUNTER
Medication was sent to wrong pharmacy. 14 day supply supposed to be sent to [facility] mail order pharmacy.

## 2022-09-20 NOTE — TELEPHONE ENCOUNTER
Pt notified about mammogram results. Verbalized understanding. States she will call back around January to schedule repeat mammogram.

## 2022-09-20 NOTE — TELEPHONE ENCOUNTER
----- Message from Elis Wick MD sent at 9/19/2022  4:04 PM CDT -----  Please call Ms. Damon to let her know that mammogram was probably benign. They did see a cyst in R breast, lateral side, for which they recommend repeat imaging in 6 months. I will enter the order.

## 2022-09-21 ENCOUNTER — CLINICAL SUPPORT (OUTPATIENT)
Dept: REHABILITATION | Facility: HOSPITAL | Age: 67
End: 2022-09-21
Payer: MEDICARE

## 2022-09-21 DIAGNOSIS — Z74.09 IMPAIRED MOBILITY: Primary | ICD-10-CM

## 2022-09-21 PROCEDURE — 97110 THERAPEUTIC EXERCISES: CPT

## 2022-09-21 PROCEDURE — 97010 HOT OR COLD PACKS THERAPY: CPT

## 2022-09-21 NOTE — PROGRESS NOTES
[facility]   Physical Therapist Treatment Note / Progress Note    Name: Nadia Damon  Clinic Number: [id number]    Therapy Diagnosis:   No diagnosis found.    Physician: Luz Dickson NP    Visit Date: 9/21/2022    Physician Orders: PT Eval and Treat   Medical Diagnosis from Referral: SI pain  Evaluation Date: 7/19/2022  Authorization Period Expiration: 12/31/2022  Plan of Care Expiration: 11/2/2022  Progress Note Due: 10 visits or 10/21/2022  Visit # / Visits authorized:  13/20 + eval     FOTO: Next Visit  Number Date Score    1 7/19/2022 46%   2       3       4             Precautions: cancer, standard    PTA Visit #:  0/5     Time In:  12:45  Time Out: 1:20  Total Billable Time: 35 minutes    SUBJECTIVE     Pt reports: upper low back pain  She was compliant with home exercise program.  Response to previous treatment: no issues  Functional change: movement without pain    Pain: 7/10 at worst, 4/10 at best  Location: right back down leg    OBJECTIVE     Objective Measures updated at progress report unless specified.     ROM    %     Lumbar Forward Bending To knees ---   Lumbar Backward Bending  40 ---     Right (degrees) Left (degrees)   Hip Flexion 100 110   Hip Extension  0 0      Strength    Right     Left   Gluteus Mike 3+/5 3+/5   Gluteus Medius 3+/5 3+/5   Hip Adductors 4/5 4/5   Hip flexors 3-/5 3/5   Quadriceps 4-/5 4-/5   Hamstrings 4-/5 4-/5        ROM 09/21/2022    %     Lumbar Forward Bending To knees ---   Lumbar Backward Bending  40% ---     Right (degrees) Left (degrees)   Hip Flexion NT NT   Hip Extension  NT NT      Strength 09/21/2022    Right     Left   Gluteus Mike NT/5 NT/5   Gluteus Medius NT/5 NT/5   Hip Adductors 4/5 4/5   Hip flexors 3-/5 3+/5   Quadriceps 4-/5 4/5   Hamstrings 4-/5 4/5     Palpation: Very tender in right lumbar and hip area     Gait Analysis: The patient ambulated without device in a flexed hip position with decreased speed and  reported pain    Treatment     Nadia received the treatments listed below:      therapeutic exercises to develop strength, flexibility and core stabilization for 30 minutes including:  +Long Sitting LTR x20  +Long Sitting Gravity Minimized Hip Ab x20  +Long Sitting Heel Slides x20  +Tests & Measures      The patient received STM to the right lateral lumbar musculature followed by moist heat    +Pt received Moist Heat to R Low Back.  Skin Inspections performed before and after application.    Patient Education and Home Exercises     Home Exercises Provided and Patient Education Provided     Education provided:   - Home program    Written Home Exercises Provided: Patient instructed to cont prior HEP. Exercises were reviewed and Nadia was able to demonstrate them prior to the end of the session.  Nadia demonstrated good  understanding of the education provided. See EMR under Patient Instructions for exercises provided during therapy sessions    ASSESSMENT     Nadia is a 67 y.o. female referred to outpatient Physical Therapy with a medical diagnosis of SI pain. Patient presents with :  Back pain  Right hip pain  Decreased range of motion right lower extremity  Decreased core and lower extremity strength    Patient has made improvements with BLE strength but continues to present with increased pain and decreased ROM, strength, and endurance.  These impairments are limiting bending, lifting, twisting affection household and recreational activities.      Nadia Is progressing well towards her goals.   Pt prognosis is Good.     Pt will continue to benefit from skilled outpatient physical therapy to address the deficits listed in the problem list box on initial evaluation, provide pt/family education and to maximize pt's level of independence in the home and community environment.     Pt's spiritual, cultural and educational needs considered and pt agreeable to plan of care and goals.     Anticipated barriers to physical  therapy: none    Goals: Short Term Goals: In 4 weeks   1. I with HEP  2. Pt to increase lumbar ROM from knees to mid shin  (Goal met 8/30/3022)  3. Pt to have pain less than 6/10 at all times.  (Goal met 8/30/3022)  4. Pt will improve FOTO disability score to 40% disability or less in order to improve overall QOL and return to PLOF.       Long Term Goals: In 8 weeks  1.  Pt will improve FOTO disability score to 35% disability or less in order to improve overall QOL and return to PLOF.    2.  Patient to demo increase in LE strength to 4/5  (Goal met 8/30/3022)  3.  Patient to have decreased pain to 3/10 at all times.  4.  Patient to demo increase lumbar ROM to 90% flexion  5.  Patient to perform daily activities including walking without limitation.    PLAN     Plan of care Certification: 7/19/2022 to 11/2/2022.     Outpatient Physical Therapy 2 times weekly for 6 weeks to include the following interventions: Electrical Stimulation as needed, Gait Training, Manual Therapy, Moist Heat/ Ice, Neuromuscular Re-ed, Patient Education, Therapeutic Activities, Therapeutic Exercise and dry needling.     Jessy Del Rio, PT

## 2022-09-22 NOTE — PROGRESS NOTES
[facility]   Physical Therapist Treatment Note / Progress Note    Name: Nadia Damon  Clinic Number: [id number]    Therapy Diagnosis:   Encounter Diagnosis   Name Primary?    Impaired mobility Yes     Physician: Luz Dickson NP    Visit Date: 9/21/2022    Physician Orders: PT Eval and Treat   Medical Diagnosis from Referral: SI pain  Evaluation Date: 7/19/2022  Authorization Period Expiration: 12/31/2023  Plan of Care Expiration: 11/02/2022  Progress Note Due: 10 visits or 10/21/2022  Visit # / Visits authorized:  13/20 + eval     FOTO: Next Visit  Number Date Score    1 7/19/2022 46%   2       3       4             Precautions: cancer, standard      PTA Visit #:  0/5     Time In:  1730  Time Out: 1615  Total Billable Time: 45 minutes    SUBJECTIVE     Pt reports: upper low back pain on the R that catches.    Improvements: bending  Difficulties: twisting with catching    She was compliant with home exercise program.  Response to previous treatment: no issues  Functional change: movement without pain    Pain: 7/10 at worst, 4/10 at best  Location: right back down leg up to shoulder    OBJECTIVE     Objective Measures updated at progress report unless specified.     ROM    %     Lumbar Forward Bending To knees ---   Lumbar Backward Bending  40 ---     Right (degrees) Left (degrees)   Hip Flexion NT NT   Hip Extension  NT NT      Strength    Right     Left   Gluteus Mike 3+/5 3+/5   Gluteus Medius 3+/5 3+/5   Hip Adductors 4/5 4/5   Hip flexors 3-/5 3/5   Quadriceps 4-/5 4-/5   Hamstrings 4-/5 4-/5        ROM 09/21/2022    %     Lumbar Forward Bending To knees ---   Lumbar Backward Bending  40% ---     Right (degrees) Left (degrees)   Hip Flexion NT 2* pain NT 2* pain   Hip Extension  NT 2* pain NT 2* pain      Strength 09/21/2022    Right     Left   Gluteus Mike NT/5 NT/5   Gluteus Medius NT/5 NT/5   Hip Adductors 4/5 4/5   Hip flexors 3-/5 3+/5   Quadriceps 4-/5 4/5    Hamstrings 4-/5 4/5     Palpation: Very tender in right lumbar and hip area     Gait Analysis: The patient ambulated without device in a flexed hip position with decreased speed and reported pain    Treatment     Nadia received the treatments listed below:      therapeutic exercises to develop strength, flexibility and core stabilization for 35 minutes including:  +Long Sitting LTR x20  +Long Sitting Gravity Minimized Hip Ab x20  +Long Sitting Heel Slides x20  +Tests & Measures      The patient received moist heat    +Pt received Moist Heat to R Low Back for 10 minutes.  Skin Inspections performed before and after application.    Patient Education and Home Exercises     Home Exercises Provided and Patient Education Provided     Education provided:   - Home program    Written Home Exercises Provided: Patient instructed to cont prior HEP. Exercises were reviewed and Nadia was able to demonstrate them prior to the end of the session.  Nadia demonstrated good  understanding of the education provided. See EMR under Patient Instructions for exercises provided during therapy sessions    ASSESSMENT     Nadia is a 67 y.o. female referred to outpatient Physical Therapy with a medical diagnosis of SI pain. Patient presents with :  Back pain  Right hip pain  Decreased range of motion right lower extremity  Decreased core and lower extremity strength    Patient has made fair improvements with BLE strength but continues to present with increased pain and decreased ROM, strength, and endurance.  These impairments are limiting bending, lifting, twisting affecting household and recreational activities.      Pt presents today with increased pain and difficulty participating in MMT and ROM testing. Pt had difficulty with bed mobility on the plinth.  Provided seated upright interventions with gentle AROM.  Continued to demonstrate difficulty with bed mobility and transitions.  Once in position, pt able to perform interventions  without pain.  MHP provided at the end with skin inspections before and after.  Pt noted improvement with bed mobility and transitioning to Sit<>Stand after hot pack placement.  Will progress as tolerated per tissue response.    Nadia Is progressing well towards her goals.   Pt prognosis is Good.     Pt will continue to benefit from skilled outpatient physical therapy to address the deficits listed in the problem list box on initial evaluation, provide pt/family education and to maximize pt's level of independence in the home and community environment.     Pt's spiritual, cultural and educational needs considered and pt agreeable to plan of care and goals.     Anticipated barriers to physical therapy: none    Goals: Short Term Goals: In 4 weeks   1. I with HEP  2. Pt to increase lumbar ROM from knees to mid shin  PROGRESSING  3. Pt to have pain less than 6/10 at all times.  PROGRESSING  4. Pt will improve FOTO disability score to 40% disability or less in order to improve overall QOL and return to PLOF.  NOT ASSESSED     Long Term Goals: In 8 weeks  1.  Pt will improve FOTO disability score to 35% disability or less in order to improve overall QOL and return to PLOF.  NOT ASSESSED  2.  Patient to demo increase in LE strength to 4/5  PROGRESSING  3.  Patient to have decreased pain to 3/10 at all times. PROGRESSING  4.  Patient to demo increase lumbar ROM to 90% flexion. PROGRESSING  5.  Patient to perform daily activities including walking without limitation. PROGRESSING    PLAN     Plan of care Certification: 7/19/2022 to 11/02/2022.     Outpatient Physical Therapy 2 times weekly for 6 weeks to include the following interventions: Electrical Stimulation as needed, Gait Training, Manual Therapy, Moist Heat/ Ice, Neuromuscular Re-ed, Patient Education, Therapeutic Activities, Therapeutic Exercise and dry needling.     Jessy Del Rio, PT

## 2022-09-23 NOTE — PLAN OF CARE
[facility]   Physical Therapist Treatment Note / Progress Note     Name: Nadia Damon  Clinic Number: [id number]     Therapy Diagnosis:        Encounter Diagnosis   Name Primary?    Impaired mobility Yes      Physician: Luz Dickson NP     Visit Date: 9/21/2022     Physician Orders: PT Eval and Treat   Medical Diagnosis from Referral: SI pain  Evaluation Date: 7/19/2022  Authorization Period Expiration: 12/31/2023  Plan of Care Expiration: 11/02/2022  Progress Note Due: 10 visits or 10/21/2022  Visit # / Visits authorized:  13/20 + eval     FOTO: Next Visit  Number Date Score    1 7/19/2022 46%   2       3       4             Precautions: cancer, standard        PTA Visit #:  0/5      Time In:  1730  Time Out: 1615  Total Billable Time: 45 minutes     SUBJECTIVE      Pt reports: upper low back pain on the R that catches.    Improvements: bending  Difficulties: twisting with catching     She was compliant with home exercise program.  Response to previous treatment: no issues  Functional change: movement without pain     Pain: 7/10 at worst, 4/10 at best  Location: right back down leg up to shoulder     OBJECTIVE      Objective Measures updated at progress report unless specified.      ROM    %     Lumbar Forward Bending To knees ---   Lumbar Backward Bending  40 ---     Right (degrees) Left (degrees)   Hip Flexion NT NT   Hip Extension  NT NT      Strength    Right     Left   Gluteus Mike 3+/5 3+/5   Gluteus Medius 3+/5 3+/5   Hip Adductors 4/5 4/5   Hip flexors 3-/5 3/5   Quadriceps 4-/5 4-/5   Hamstrings 4-/5 4-/5         ROM 09/21/2022    %     Lumbar Forward Bending To knees ---   Lumbar Backward Bending  40% ---     Right (degrees) Left (degrees)   Hip Flexion NT 2* pain NT 2* pain   Hip Extension  NT 2* pain NT 2* pain      Strength 09/21/2022    Right     Left   Gluteus Mike NT/5 NT/5   Gluteus Medius NT/5 NT/5   Hip Adductors 4/5 4/5   Hip flexors 3-/5 3+/5    Quadriceps 4-/5 4/5   Hamstrings 4-/5 4/5      Palpation: Very tender in right lumbar and hip area     Gait Analysis: The patient ambulated without device in a flexed hip position with decreased speed and reported pain     Treatment      Nadia received the treatments listed below:       therapeutic exercises to develop strength, flexibility and core stabilization for 35 minutes including:  +Long Sitting LTR x20  +Long Sitting Gravity Minimized Hip Ab x20  +Long Sitting Heel Slides x20  +Tests & Measures        The patient received moist heat     +Pt received Moist Heat to R Low Back for 10 minutes.  Skin Inspections performed before and after application.     Patient Education and Home Exercises      Home Exercises Provided and Patient Education Provided      Education provided:   - Home program     Written Home Exercises Provided: Patient instructed to cont prior HEP. Exercises were reviewed and Nadia was able to demonstrate them prior to the end of the session.  Nadia demonstrated good  understanding of the education provided. See EMR under Patient Instructions for exercises provided during therapy sessions     ASSESSMENT      Nadia is a 67 y.o. female referred to outpatient Physical Therapy with a medical diagnosis of SI pain. Patient presents with :  Back pain  Right hip pain  Decreased range of motion right lower extremity  Decreased core and lower extremity strength     Patient has made fair improvements with BLE strength but continues to present with increased pain and decreased ROM, strength, and endurance.  These impairments are limiting bending, lifting, twisting affecting household and recreational activities.       Pt presents today with increased pain and difficulty participating in MMT and ROM testing. Pt had difficulty with bed mobility on the plinth.  Provided seated upright interventions with gentle AROM.  Continued to demonstrate difficulty with bed mobility and transitions.  Once in position, pt  able to perform interventions without pain.  MHP provided at the end with skin inspections before and after.  Pt noted improvement with bed mobility and transitioning to Sit<>Stand after hot pack placement.  Will progress as tolerated per tissue response.     Nadia Is progressing well towards her goals.   Pt prognosis is Good.      Pt will continue to benefit from skilled outpatient physical therapy to address the deficits listed in the problem list box on initial evaluation, provide pt/family education and to maximize pt's level of independence in the home and community environment.      Pt's spiritual, cultural and educational needs considered and pt agreeable to plan of care and goals.     Anticipated barriers to physical therapy: none     Goals: Short Term Goals: In 4 weeks   1. I with HEP  2. Pt to increase lumbar ROM from knees to mid shin  PROGRESSING  3. Pt to have pain less than 6/10 at all times.  PROGRESSING  4. Pt will improve FOTO disability score to 40% disability or less in order to improve overall QOL and return to PLOF.  NOT ASSESSED     Long Term Goals: In 8 weeks  1.  Pt will improve FOTO disability score to 35% disability or less in order to improve overall QOL and return to PLOF.  NOT ASSESSED  2.  Patient to demo increase in LE strength to 4/5  PROGRESSING  3.  Patient to have decreased pain to 3/10 at all times. PROGRESSING  4.  Patient to demo increase lumbar ROM to 90% flexion. PROGRESSING  5.  Patient to perform daily activities including walking without limitation. PROGRESSING     PLAN      Plan of care Certification: 7/19/2022 to 11/02/2022.     Outpatient Physical Therapy 2 times weekly for 6 weeks to include the following interventions: Electrical Stimulation as needed, Gait Training, Manual Therapy, Moist Heat/ Ice, Neuromuscular Re-ed, Patient Education, Therapeutic Activities, Therapeutic Exercise and dry needling.      Jessy Del Rio, PT

## 2022-09-27 ENCOUNTER — TELEPHONE (OUTPATIENT)
Dept: TRANSPLANT | Facility: CLINIC | Age: 67
End: 2022-09-27
Payer: MEDICARE

## 2022-09-27 NOTE — TELEPHONE ENCOUNTER
Post heart transplant, spoke with pt and scheduled her f/u labwork including a lipid panel.    Scheduled for 10/4/22.

## 2022-09-27 NOTE — TELEPHONE ENCOUNTER
----- Message from Carolina Elder sent at 8/25/2022 11:31 AM CDT -----  Regarding: repeat lipids in Sept

## 2022-09-29 DIAGNOSIS — M79.10 MYALGIA: Primary | ICD-10-CM

## 2022-09-30 ENCOUNTER — TELEPHONE (OUTPATIENT)
Dept: PRIMARY CARE CLINIC | Facility: CLINIC | Age: 67
End: 2022-09-30
Payer: MEDICARE

## 2022-09-30 ENCOUNTER — TELEPHONE (OUTPATIENT)
Dept: PAIN MEDICINE | Facility: CLINIC | Age: 67
End: 2022-09-30

## 2022-09-30 ENCOUNTER — LAB VISIT (OUTPATIENT)
Dept: LAB | Facility: HOSPITAL | Age: 67
End: 2022-09-30
Attending: NURSE PRACTITIONER
Payer: MEDICARE

## 2022-09-30 ENCOUNTER — OFFICE VISIT (OUTPATIENT)
Dept: PAIN MEDICINE | Facility: CLINIC | Age: 67
End: 2022-09-30
Payer: MEDICARE

## 2022-09-30 VITALS
BODY MASS INDEX: 31.97 KG/M2 | WEIGHT: 173.75 LBS | HEART RATE: 97 BPM | RESPIRATION RATE: 17 BRPM | HEIGHT: 62 IN | SYSTOLIC BLOOD PRESSURE: 155 MMHG | DIASTOLIC BLOOD PRESSURE: 97 MMHG

## 2022-09-30 DIAGNOSIS — M79.10 MYALGIA: ICD-10-CM

## 2022-09-30 DIAGNOSIS — M54.16 LUMBAR RADICULOPATHY: ICD-10-CM

## 2022-09-30 DIAGNOSIS — Z94.1 HEART TRANSPLANTED: ICD-10-CM

## 2022-09-30 DIAGNOSIS — R10.9 FLANK PAIN: Primary | ICD-10-CM

## 2022-09-30 DIAGNOSIS — G89.4 CHRONIC PAIN DISORDER: ICD-10-CM

## 2022-09-30 DIAGNOSIS — M47.816 LUMBAR SPONDYLOSIS: ICD-10-CM

## 2022-09-30 DIAGNOSIS — M46.1 SACROILIITIS: ICD-10-CM

## 2022-09-30 DIAGNOSIS — M47.816 LUMBAR SPONDYLOSIS: Primary | ICD-10-CM

## 2022-09-30 LAB
ALBUMIN SERPL BCP-MCNC: 3.5 G/DL (ref 3.5–5.2)
ALP SERPL-CCNC: 152 U/L (ref 55–135)
ALT SERPL W/O P-5'-P-CCNC: 26 U/L (ref 10–44)
ANION GAP SERPL CALC-SCNC: 13 MMOL/L (ref 8–16)
AST SERPL-CCNC: 30 U/L (ref 10–40)
BASOPHILS # BLD AUTO: 0.02 K/UL (ref 0–0.2)
BASOPHILS NFR BLD: 0.6 % (ref 0–1.9)
BILIRUB SERPL-MCNC: 0.5 MG/DL (ref 0.1–1)
BUN SERPL-MCNC: 34 MG/DL (ref 8–23)
CALCIUM SERPL-MCNC: 8.3 MG/DL (ref 8.7–10.5)
CHLORIDE SERPL-SCNC: 107 MMOL/L (ref 95–110)
CK SERPL-CCNC: 342 U/L (ref 20–180)
CO2 SERPL-SCNC: 22 MMOL/L (ref 23–29)
CREAT SERPL-MCNC: 2.6 MG/DL (ref 0.5–1.4)
CRP SERPL-MCNC: 19 MG/L (ref 0–8.2)
DIFFERENTIAL METHOD: ABNORMAL
EOSINOPHIL # BLD AUTO: 0.1 K/UL (ref 0–0.5)
EOSINOPHIL NFR BLD: 3.5 % (ref 0–8)
ERYTHROCYTE [DISTWIDTH] IN BLOOD BY AUTOMATED COUNT: 15.7 % (ref 11.5–14.5)
ERYTHROCYTE [SEDIMENTATION RATE] IN BLOOD BY WESTERGREN METHOD: 50 MM/HR (ref 0–20)
EST. GFR  (NO RACE VARIABLE): 19 ML/MIN/1.73 M^2
GLUCOSE SERPL-MCNC: 130 MG/DL (ref 70–110)
HCT VFR BLD AUTO: 33.9 % (ref 37–48.5)
HGB BLD-MCNC: 10.8 G/DL (ref 12–16)
IMM GRANULOCYTES # BLD AUTO: 0.01 K/UL (ref 0–0.04)
IMM GRANULOCYTES NFR BLD AUTO: 0.3 % (ref 0–0.5)
LYMPHOCYTES # BLD AUTO: 0.6 K/UL (ref 1–4.8)
LYMPHOCYTES NFR BLD: 18.6 % (ref 18–48)
MCH RBC QN AUTO: 28.1 PG (ref 27–31)
MCHC RBC AUTO-ENTMCNC: 31.9 G/DL (ref 32–36)
MCV RBC AUTO: 88 FL (ref 82–98)
MONOCYTES # BLD AUTO: 0.4 K/UL (ref 0.3–1)
MONOCYTES NFR BLD: 12.1 % (ref 4–15)
NEUTROPHILS # BLD AUTO: 2.2 K/UL (ref 1.8–7.7)
NEUTROPHILS NFR BLD: 64.9 % (ref 38–73)
NRBC BLD-RTO: 0 /100 WBC
PLATELET # BLD AUTO: 215 K/UL (ref 150–450)
PMV BLD AUTO: 9.2 FL (ref 9.2–12.9)
POTASSIUM SERPL-SCNC: 4.5 MMOL/L (ref 3.5–5.1)
PROT SERPL-MCNC: 7.3 G/DL (ref 6–8.4)
RBC # BLD AUTO: 3.84 M/UL (ref 4–5.4)
SODIUM SERPL-SCNC: 142 MMOL/L (ref 136–145)
WBC # BLD AUTO: 3.39 K/UL (ref 3.9–12.7)

## 2022-09-30 PROCEDURE — 1101F PR PT FALLS ASSESS DOC 0-1 FALLS W/OUT INJ PAST YR: ICD-10-PCS | Mod: CPTII,S$GLB,, | Performed by: ANESTHESIOLOGY

## 2022-09-30 PROCEDURE — 3080F PR MOST RECENT DIASTOLIC BLOOD PRESSURE >= 90 MM HG: ICD-10-PCS | Mod: CPTII,S$GLB,, | Performed by: ANESTHESIOLOGY

## 2022-09-30 PROCEDURE — 3008F BODY MASS INDEX DOCD: CPT | Mod: CPTII,S$GLB,, | Performed by: ANESTHESIOLOGY

## 2022-09-30 PROCEDURE — 4010F PR ACE/ARB THEARPY RXD/TAKEN: ICD-10-PCS | Mod: CPTII,S$GLB,, | Performed by: ANESTHESIOLOGY

## 2022-09-30 PROCEDURE — 99213 OFFICE O/P EST LOW 20 MIN: CPT | Mod: S$GLB,,, | Performed by: ANESTHESIOLOGY

## 2022-09-30 PROCEDURE — 1157F ADVNC CARE PLAN IN RCRD: CPT | Mod: CPTII,S$GLB,, | Performed by: ANESTHESIOLOGY

## 2022-09-30 PROCEDURE — 80053 COMPREHEN METABOLIC PANEL: CPT | Performed by: NURSE PRACTITIONER

## 2022-09-30 PROCEDURE — 36415 COLL VENOUS BLD VENIPUNCTURE: CPT | Performed by: NURSE PRACTITIONER

## 2022-09-30 PROCEDURE — 3288F FALL RISK ASSESSMENT DOCD: CPT | Mod: CPTII,S$GLB,, | Performed by: ANESTHESIOLOGY

## 2022-09-30 PROCEDURE — 1101F PT FALLS ASSESS-DOCD LE1/YR: CPT | Mod: CPTII,S$GLB,, | Performed by: ANESTHESIOLOGY

## 2022-09-30 PROCEDURE — 85025 COMPLETE CBC W/AUTO DIFF WBC: CPT | Performed by: NURSE PRACTITIONER

## 2022-09-30 PROCEDURE — 3066F PR DOCUMENTATION OF TREATMENT FOR NEPHROPATHY: ICD-10-PCS | Mod: CPTII,S$GLB,, | Performed by: ANESTHESIOLOGY

## 2022-09-30 PROCEDURE — 99213 PR OFFICE/OUTPT VISIT, EST, LEVL III, 20-29 MIN: ICD-10-PCS | Mod: S$GLB,,, | Performed by: ANESTHESIOLOGY

## 2022-09-30 PROCEDURE — 99999 PR PBB SHADOW E&M-EST. PATIENT-LVL V: CPT | Mod: PBBFAC,,, | Performed by: ANESTHESIOLOGY

## 2022-09-30 PROCEDURE — 86140 C-REACTIVE PROTEIN: CPT | Performed by: NURSE PRACTITIONER

## 2022-09-30 PROCEDURE — 82550 ASSAY OF CK (CPK): CPT | Performed by: NURSE PRACTITIONER

## 2022-09-30 PROCEDURE — 3077F SYST BP >= 140 MM HG: CPT | Mod: CPTII,S$GLB,, | Performed by: ANESTHESIOLOGY

## 2022-09-30 PROCEDURE — 1159F PR MEDICATION LIST DOCUMENTED IN MEDICAL RECORD: ICD-10-PCS | Mod: CPTII,S$GLB,, | Performed by: ANESTHESIOLOGY

## 2022-09-30 PROCEDURE — 3077F PR MOST RECENT SYSTOLIC BLOOD PRESSURE >= 140 MM HG: ICD-10-PCS | Mod: CPTII,S$GLB,, | Performed by: ANESTHESIOLOGY

## 2022-09-30 PROCEDURE — 3066F NEPHROPATHY DOC TX: CPT | Mod: CPTII,S$GLB,, | Performed by: ANESTHESIOLOGY

## 2022-09-30 PROCEDURE — 1125F PR PAIN SEVERITY QUANTIFIED, PAIN PRESENT: ICD-10-PCS | Mod: CPTII,S$GLB,, | Performed by: ANESTHESIOLOGY

## 2022-09-30 PROCEDURE — 1125F AMNT PAIN NOTED PAIN PRSNT: CPT | Mod: CPTII,S$GLB,, | Performed by: ANESTHESIOLOGY

## 2022-09-30 PROCEDURE — 3008F PR BODY MASS INDEX (BMI) DOCUMENTED: ICD-10-PCS | Mod: CPTII,S$GLB,, | Performed by: ANESTHESIOLOGY

## 2022-09-30 PROCEDURE — 85651 RBC SED RATE NONAUTOMATED: CPT | Performed by: NURSE PRACTITIONER

## 2022-09-30 PROCEDURE — 1157F PR ADVANCE CARE PLAN OR EQUIV PRESENT IN MEDICAL RECORD: ICD-10-PCS | Mod: CPTII,S$GLB,, | Performed by: ANESTHESIOLOGY

## 2022-09-30 PROCEDURE — 3080F DIAST BP >= 90 MM HG: CPT | Mod: CPTII,S$GLB,, | Performed by: ANESTHESIOLOGY

## 2022-09-30 PROCEDURE — 99999 PR PBB SHADOW E&M-EST. PATIENT-LVL V: ICD-10-PCS | Mod: PBBFAC,,, | Performed by: ANESTHESIOLOGY

## 2022-09-30 PROCEDURE — 4010F ACE/ARB THERAPY RXD/TAKEN: CPT | Mod: CPTII,S$GLB,, | Performed by: ANESTHESIOLOGY

## 2022-09-30 PROCEDURE — 1159F MED LIST DOCD IN RCRD: CPT | Mod: CPTII,S$GLB,, | Performed by: ANESTHESIOLOGY

## 2022-09-30 PROCEDURE — 3288F PR FALLS RISK ASSESSMENT DOCUMENTED: ICD-10-PCS | Mod: CPTII,S$GLB,, | Performed by: ANESTHESIOLOGY

## 2022-09-30 RX ORDER — TIZANIDINE 4 MG/1
4 TABLET ORAL 2 TIMES DAILY PRN
Qty: 60 TABLET | Refills: 1 | Status: SHIPPED | OUTPATIENT
Start: 2022-09-30 | End: 2022-10-27

## 2022-09-30 RX ORDER — TIZANIDINE 4 MG/1
4 TABLET ORAL 2 TIMES DAILY PRN
Qty: 60 TABLET | Refills: 1 | Status: SHIPPED | OUTPATIENT
Start: 2022-09-30 | End: 2022-09-30 | Stop reason: SDUPTHER

## 2022-09-30 NOTE — TELEPHONE ENCOUNTER
----- Message from Carine Rivera sent at 9/30/2022  4:21 PM CDT -----  Patient is calling in reference to prescription was called in to  the wrong pharmacy. Dr. Pierre has to cancel prescription at [facility] and send to [facility] on [address] and [place] .  Please call her back at [phone].  Thanks/nicko

## 2022-09-30 NOTE — TELEPHONE ENCOUNTER
----- Message from Luz Dickson NP sent at 9/30/2022 12:18 PM CDT -----  Please schedule appt for follow up early next week.

## 2022-09-30 NOTE — H&P (VIEW-ONLY)
Interventional Pain Progress Note     Referring Physician: No ref. provider found    PCP: Luz Dickson NP    Chief Complaint: Low Back Pain    Interval History (09/30/2022):  Patient returns to clinic after procedure.  Patient reports complete resolution of right lower extremity pain after right sacroiliac joint injection and right L5-S1 facet injection on 08/22/2022.  Primary pain today is tight aching pain in right side of lower back.  Pain is worse with extension and rotation, better with rest and heat.  Patient does report an episode of physical therapy on 09/21/2022 where she experienced increased muscle aches and weakness.  She reports that since this time her symptoms have greatly improved.  She denies any significant weakness today.  Pain is rated a 7/10. Denies any fevers, chills, changes in gait, weakness, or bowel and bladder incontinence        SUBJECTIVE:    Nadia Damon is a 67 y.o. female who presents to the clinic for the evaluation of lower back pain. The pain started 1 year ago and symptoms have been worsening.The pain is located in the right lower back and right buttocks area with radiation to the posterior lateral aspect of her right thigh and occasionally her right calf.  The pain is described as aching, shooting and stabbing and is rated as 5/10.   The pain is rated with a score of  2/10 on the BEST day and a score of 9/10 on the WORST day.  Symptoms interfere with daily activity and work. The pain is exacerbated by Sitting, Standing, Extension and Flexing.  The pain is mitigated by physical therapy and rest.     Patient denies night fever/night sweats, urinary incontinence, bowel incontinence, significant weight loss, significant motor weakness and loss of sensations.      Non-Pharmacologic Treatments:  Physical Therapy/Home Exercise: yes  Ice/Heat:yes  TENS: no  Acupuncture: no  Massage: no  Chiropractic: no        Previous Pain Medications:  Tylenol, topicals, neuropathic,       report:  Reviewed and consistent with medication use as prescribed.    Pain Procedures:   8/22/22:  Right sacroiliac joint and right L5-S1 facet injection,   Resolution of right lower extremity pain    Imaging:       Results for orders placed during the hospital encounter of 07/13/22    X-Ray Lumbar Spine 5 View    Narrative  EXAMINATION:  XR LUMBAR SPINE COMPLETE 5 VIEW    CLINICAL HISTORY:  Sacrococcygeal disorders, not elsewhere classified    TECHNIQUE:  AP, lateral, spot and bilateral oblique views of the lumbar spine were performed.    COMPARISON:  Lumbar spine radiographs May 17, 2018    FINDINGS:  Minimal grade 1 anterolisthesis of L4 on L5.  No fracture or pars defect.  Unchanged mild disc height loss at the L4-L5 level.  Moderate to severe degenerative disc disease at the T11-T12 level.  Prominent inferior lumbar spine facet arthropathy.  Sacroiliac joints appear normal.  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  No osseous erosion or suspicious osseous lesion.    Impression  As above.      Electronically signed by: Isac Bell  Date:    07/13/2022  Time:    15:39          Past Medical History:   Diagnosis Date    Abdominal wall hernia     CT Renal 6/11/2018---Small fat containing superior ventral abdominal wall hernia at the epicardial pacing lead site.    Abnormal mammogram 10/12/2021    Anxiety     Arthritis     ZEN HIPS    Breast cancer in female 08/2021    LEFT BREAST    C. difficile colitis 11/29/2021    Cellulitis of axilla, left 12/23/2021    Chronic diastolic heart failure 12/16/2021    Chronic kidney disease     stage 4, GFR 15-29 ml/min    Chronic midline low back pain without sciatica 06/18/2018    Closed nondisplaced fracture of distal phalanx of left great toe with routine healing 10/22/2018    Coronary artery disease 1993    heart transplant    Cystitis 5/10/2022    Depression     Fibromyalgia     on Lyrica    Heart failure      native heart cardiomyopathy    Heart transplanted 1993    due to cardiomyopathy    History of hyperparathyroidism; Hyperparathyroidism, secondary renal     PT DENIES    Hypertension     Immune disorder     anti rejection meds    Iron deficiency anemia 08/15/2017    Kidney stones     passed per pt    Obesity     Other osteoporosis without current pathological fracture 08/30/2019    Severe sepsis 11/22/2021    Shingles 2003 approx    left leg    Trouble in sleeping     Urinary incontinence      Past Surgical History:   Procedure Laterality Date    BLADDER SURGERY  2015 approx    mesh - Dr Everett then 2nd reconstructive sx Dr Onofre    BREAST BIOPSY Bilateral     NEGATIVE    BREAST LUMPECTOMY Left 2021    BREAST SURGERY Left 09/28/2015    Bx - benign    BREAST SURGERY Right 12/2015    Bx benign    CARDIAC PACEMAKER REMOVAL Left 06/26/2014    Pacer defirillator removed. Put in 1993 aat time of heart transplant    CARPAL TUNNEL RELEASE Left 03/03/2015    Dr. Hall    COLONOSCOPY N/A 02/25/2021    Procedure: COLONOSCOPY;  Surgeon: Freida Ramirez MD;  Location: [facility] ENDO;  Service: Endoscopy;  Laterality: N/A;    HEART TRANSPLANT  1993    HERNIA REPAIR Right 1971 approx    Inguinal    HYSTERECTOMY  1983    vag hyst /LSO     INCISION AND DRAINAGE OF ABSCESS Left 12/24/2021    Procedure: INCISION AND DRAINAGE, ABSCESS;  Surgeon: Joseph Longo MD;  Location: [facility] OR;  Service: General;  Laterality: Left;    INJECTION OF ANESTHETIC AGENT INTO SACROILIAC JOINT Right 08/22/2022    Procedure: Right SIJ Injection Right L5/S1 Facte Injection;  Surgeon: Jassi Pierre MD;  Location: [facility] MGT;  Service: Pain Management;  Laterality: Right;    INSERTION OF TUNNELED CENTRAL VENOUS CATHETER (CVC) WITH SUBCUTANEOUS PORT N/A 11/09/2021    Procedure: DEXIUDMDX-ETDN-B-CATH;  Surgeon: Christoph Douglas MD;  Location: [facility] OR;  Service: General;  Laterality: N/A;    REMOVAL OF VASCULAR ACCESS PORT      SENTINEL LYMPH NODE BIOPSY  Left 10/12/2021    Procedure: BIOPSY, LYMPH NODE, SENTINEL;  Surgeon: Christoph Douglas MD;  Location: [facility];  Service: General;  Laterality: Left;    TOE SURGERY       Social History     Socioeconomic History    Marital status: Single    Number of children: 2    Highest education level: 11th grade   Occupational History    Occupation: Retired   Tobacco Use    Smoking status: Never    Smokeless tobacco: Never   Substance and Sexual Activity    Alcohol use: Never     Alcohol/week: 0.0 standard drinks    Drug use: No    Sexual activity: Not Currently     Partners: Male     Birth control/protection: See Surgical Hx   Other Topics Concern    Are you pregnant or think you may be? No    Breast-feeding No   Social History Narrative    Single. 2 children , 1  at 30 yoa   strep throat -  pneumonia and renal complications after not completing course of AB. Other child lives in [place], [place]. Has a cousin locally that could help in an emergency. 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 Caffeine intake =- 1 cola a day. No coffee, + occasional tea, avoids caffeine especially at night. Still drives. She does not have a Living Will or Advanced directive.      Family History   Problem Relation Age of Onset    Cancer Mother 38        breast    Breast cancer Mother     Breast cancer Maternal Grandmother     Heart disease Maternal Grandmother     Hypertension Son     Cataracts Cousin     Diabetes Neg Hx     Stroke Neg Hx     Kidney disease Neg Hx     Asthma Neg Hx     COPD Neg Hx     Melanoma Neg Hx     Hyperlipidemia Neg Hx        Review of patient's allergies indicates:   Allergen Reactions    Lisinopril Swelling and Rash    Augmentin [amoxicillin-pot clavulanate] Diarrhea       Current Outpatient Medications   Medication Sig    acetaminophen (TYLENOL) 325 MG tablet Take 1 tablet (325 mg total) by mouth every 6 (six) hours as needed for Pain.    amLODIPine (NORVASC) 2.5 MG tablet     aspirin  (ECOTRIN) 81 MG EC tablet Take 1 tablet (81 mg total) by mouth once daily.    azelastine (ASTELIN) 137 mcg (0.1 %) nasal spray 2 sprays (274 mcg total) by Nasal route 2 (two) times daily.    biotin 10,000 mcg Cap Take 1 tablet by mouth once daily.    busPIRone (BUSPAR) 10 MG tablet Take 1 tablet (10 mg total) by mouth once daily.    calcitonin, salmon, (FORTICAL) 200 unit/actuation nasal spray 1 spray by Nasal route once daily. Alternate nostrils - give with calcium, vitamin D    carvediloL (COREG) 6.25 MG tablet Take 1 tablet (6.25 mg total) by mouth 2 (two) times daily with meals.    cycloSPORINE modified, NEORAL, (NEORAL) 25 MG capsule TAKE 3 CAPSULES (75 MG TOTAL) BY MOUTH 2 (TWO) TIMES DAILY.    DULoxetine (CYMBALTA) 30 MG capsule TAKE 1 CAPSULE EVERY DAY    EVENING PRIMROSE OIL ORAL Take 1,000 mg by mouth once daily.    furosemide (LASIX) 20 MG tablet Take 1 tablet (20 mg total) by mouth 2 (two) times a day for 5 days, THEN 1 tablet (20 mg total) once daily. Take 1 tablet daily.    hydrALAZINE (APRESOLINE) 50 MG tablet Take 1 tablet (50 mg total) by mouth every 8 (eight) hours. TAKE 1 TABLETS  EVERY 8  HOURS.    multivitamin capsule Take 1 capsule by mouth once daily.    ondansetron (ZOFRAN-ODT) 8 MG TbDL Take 1 tablet (8 mg total) by mouth every 8 (eight) hours as needed (nausea).    pantoprazole (PROTONIX) 40 MG tablet TAKE 1 TABLET EVERY DAY    pregabalin (LYRICA) 50 MG capsule TAKE 1 CAPSULE 2 TIMES DAILY AT NOON AND NIGHT TIME    RETACRIT 20,000 unit/mL injection     temazepam (RESTORIL) 30 mg capsule Take 1 at bedtime    vitamin E 400 UNIT capsule Take 400 Units by mouth once daily.    zolpidem (AMBIEN) 10 mg Tab TAKE 1 TABLET BY MOUTH ONCE DAILY IN THE EVENING    tiZANidine (ZANAFLEX) 4 MG tablet Take 1 tablet (4 mg total) by mouth 2 (two) times daily as needed (Pain).     Current Facility-Administered Medications   Medication    denosumab (PROLIA) injection 60 mg     Facility-Administered Medications  "Ordered in Other Visits   Medication    lactated ringers infusion         ROS  Review of Systems   Constitutional:  Negative for chills, diaphoresis, fatigue and fever.   Respiratory:  Negative for chest tightness, shortness of breath, wheezing and stridor.    Cardiovascular:  Positive for leg swelling. Negative for chest pain.   Gastrointestinal:  Negative for blood in stool, diarrhea, nausea and vomiting.   Endocrine: Negative for cold intolerance and heat intolerance.   Genitourinary:  Negative for dysuria, hematuria and urgency.   Musculoskeletal:  Positive for arthralgias, back pain and myalgias. Negative for gait problem, joint swelling, neck pain and neck stiffness.   Skin:  Negative for rash.   Neurological:  Negative for tremors, seizures, speech difficulty, weakness, light-headedness, numbness and headaches.   Hematological:  Does not bruise/bleed easily.   Psychiatric/Behavioral:  Negative for agitation, confusion and suicidal ideas. The patient is not nervous/anxious.           OBJECTIVE:  BP (!) 155/97   Pulse 97   Resp 17   Ht 5' 2" (1.575 m)   Wt 78.8 kg (173 lb 11.6 oz)   LMP 06/20/1983 (Within Years)   BMI 31.77 kg/m²         Physical Exam  Constitutional:       General: She is not in acute distress.     Appearance: Normal appearance. She is not ill-appearing.   HENT:      Head: Normocephalic and atraumatic.      Nose: No congestion or rhinorrhea.      Mouth/Throat:      Mouth: Mucous membranes are moist.   Eyes:      Extraocular Movements: Extraocular movements intact.      Pupils: Pupils are equal, round, and reactive to light.   Cardiovascular:      Pulses: Normal pulses.   Pulmonary:      Effort: Pulmonary effort is normal.   Abdominal:      General: Abdomen is flat.      Palpations: Abdomen is soft.   Musculoskeletal:      Cervical back: Normal range of motion and neck supple.   Skin:     General: Skin is warm and dry.      Capillary Refill: Capillary refill takes less than 2 seconds. "   Neurological:      General: No focal deficit present.      Mental Status: She is alert and oriented to person, place, and time.      Cranial Nerves: No cranial nerve deficit.      Sensory: No sensory deficit.      Motor: No weakness or abnormal muscle tone.      Gait: Gait normal.      Deep Tendon Reflexes: Babinski sign absent on the right side. Babinski sign absent on the left side.      Reflex Scores:       Patellar reflexes are 2+ on the right side and 2+ on the left side.       Achilles reflexes are 2+ on the right side and 2+ on the left side.  Psychiatric:         Mood and Affect: Mood normal.         Behavior: Behavior normal.         Thought Content: Thought content normal.         Musculoskeletal:        Lumbar Exam  Incision: no  Pain with Flexion: yes  Pain with Extension: yes  ROM:  Decreased secondary to pain  Paraspinous TTP:  Right greater than left  Facet TTP:  L5-S1  Facet Loading:  Positive on the right  SLR:  Negative bilaterally  SIJ TTP:  Negative bilaterally  ALBERTO:  Negative bilaterally      LABS:  Lab Results   Component Value Date    WBC 3.39 (L) 09/30/2022    HGB 10.8 (L) 09/30/2022    HCT 33.9 (L) 09/30/2022    MCV 88 09/30/2022     09/30/2022       CMP  Sodium   Date Value Ref Range Status   09/30/2022 142 136 - 145 mmol/L Final     Potassium   Date Value Ref Range Status   09/30/2022 4.5 3.5 - 5.1 mmol/L Final     Chloride   Date Value Ref Range Status   09/30/2022 107 95 - 110 mmol/L Final     CO2   Date Value Ref Range Status   09/30/2022 22 (L) 23 - 29 mmol/L Final     Glucose   Date Value Ref Range Status   09/30/2022 130 (H) 70 - 110 mg/dL Final     BUN   Date Value Ref Range Status   09/30/2022 34 (H) 8 - 23 mg/dL Final     Creatinine   Date Value Ref Range Status   09/30/2022 2.6 (H) 0.5 - 1.4 mg/dL Final     Calcium   Date Value Ref Range Status   09/30/2022 8.3 (L) 8.7 - 10.5 mg/dL Final     Total Protein   Date Value Ref Range Status   09/30/2022 7.3 6.0 - 8.4 g/dL  Final     Albumin   Date Value Ref Range Status   09/30/2022 3.5 3.5 - 5.2 g/dL Final     Total Bilirubin   Date Value Ref Range Status   09/30/2022 0.5 0.1 - 1.0 mg/dL Final     Comment:     For infants and newborns, interpretation of results should be based  on gestational age, weight and in agreement with clinical  observations.    Premature Infant recommended reference ranges:  Up to 24 hours.............<8.0 mg/dL  Up to 48 hours............<12.0 mg/dL  3-5 days..................<15.0 mg/dL  6-29 days.................<15.0 mg/dL       Alkaline Phosphatase   Date Value Ref Range Status   09/30/2022 152 (H) 55 - 135 U/L Final     AST   Date Value Ref Range Status   09/30/2022 30 10 - 40 U/L Final     ALT   Date Value Ref Range Status   09/30/2022 26 10 - 44 U/L Final     Anion Gap   Date Value Ref Range Status   09/30/2022 13 8 - 16 mmol/L Final     eGFR if    Date Value Ref Range Status   07/14/2022 19 (A) >60 mL/min/1.73 m^2 Final     eGFR if non    Date Value Ref Range Status   07/14/2022 17 (A) >60 mL/min/1.73 m^2 Final     Comment:     Calculation used to obtain the estimated glomerular filtration  rate (eGFR) is the CKD-EPI equation.          Lab Results   Component Value Date    HGBA1C 5.2 12/24/2021             ASSESSMENT:       67 y.o. year old female with lower back pain, consistent with     1. Lumbar spondylosis  Case Request-RAD/Other Procedure Area: Right L4/L5 and L5/S1 MBB    IR Facet Inj Lumbar 1st Vert Uni    IR Facet Inj Lumbar 2nd Vert Uni    DISCONTINUED: tiZANidine (ZANAFLEX) 4 MG tablet      2. Lumbar radiculopathy        3. Chronic pain disorder        4. Sacroiliitis        5. Heart transplanted          Lumbar spondylosis  -     Discontinue: tiZANidine (ZANAFLEX) 4 MG tablet; Take 1 tablet (4 mg total) by mouth 2 (two) times daily as needed (Pain).  Dispense: 60 tablet; Refill: 1  -     Case Request-RAD/Other Procedure Area: Right L4/L5 and L5/S1 MBB  -      IR Facet Inj Lumbar 1st Vert Uni; Future; Expected date: 09/30/2022  -     IR Facet Inj Lumbar 2nd Vert Uni; Future; Expected date: 09/30/2022    Lumbar radiculopathy    Chronic pain disorder    Sacroiliitis    Heart transplanted           PLAN:   - Interventions:    We will schedule patient for right L4-5 and L5-S1 medial branch block for facet mediated disease.  If patient has 2 positive medial branch blocks, we will proceed with RFA  -8/22/22:  Right sacroiliac joint and right L5-S1 facet injection,   Resolution of right lower extremity pain  - Anticoagulation use:   no no anticoagulation    - Medications:   Continue Tylenol, Cymbalta, and Lyrica as prescribed by primary care   Discontinue Robaxin and start tizanidine 4 mg twice a day as needed    - Therapy:    Patient reports 1 episode of increased muscle pain and weakness at physical therapy on 09/21/2022.  She reports significant improvement since this time.  Neuro exam is normal today in clinic.  No need for repeat imaging.  We will make sure all of her lab work is normal before she returns physical therapy.    - Imaging:   X-ray Lumbar  reviewed and findings discussed the patient.  Significant for grade 1 anterolisthesis of L4 upon L5.  Degenerative disc disease at T11-T12.  As well as pseudo joint formation of right L5 transverse process with superior sacrum   Can consider CT of lumbar spine if lower back pain continues    - Consults:   Continue follow-up with cardiology for previous heart transplant    - Counseled patient regarding the importance of activity modification and physical therapy    - Patient Questions: Answered all of the patient's questions regarding diagnosis, therapy, and treatment    - Follow up visit:  Patient will be called after 1st medial branch block        The above plan and management options were discussed at length with patient. Patient is in agreement with the above and verbalized understanding.    I discussed the goals of  interventional chronic pain management with the patient on today's visit.  I explained the utility of injections for diagnostic and therapeutic purposes.  We discussed a multimodal approach to pain including treating the patient's given worst pain at any given time.  We will use a systematic approach to addressing pain.  We will also adopt a multimodal approach that includes injections, adjuvant medications, physical therapy, at times psychiatry.  There may be a limited role for opioid use intermittently in the treatment of pain, more particularly for acute pain although no one approach can be used as a sole treatment modality.    I emphasized the importance of regular exercise, core strengthening and stretching, diet and weight loss as a cornerstone of long-term pain management.      Jassi Pierre MD  Interventional Pain Management  [facility]    Disclaimer:  This note was prepared using voice recognition system and is likely to have sound alike errors that may have been overlooked even after proof reading.  Please call me with any questions

## 2022-09-30 NOTE — TELEPHONE ENCOUNTER
----- Message from Mariela Ramirez sent at 9/30/2022  2:28 PM CDT -----  Pt would like to confirm the prescription today was sent to [facility]. Call back number is [phone]. [email or web address] Wolfe    [facility] #[id number] - [address] & [address] [address]  Phone: [phone] Fax: [phone]       
Good Afternoon,     Pt is requesting for her Zanaflex Medication to get sent to [facility] #[id number] - [place], [place] - [address] [facility] [address] [facility] AT [address] & [id number] [facility]   Is this something you can do?      Kye Mejia   Medical Assistant     
n/a

## 2022-09-30 NOTE — PROGRESS NOTES
Interventional Pain Progress Note     Referring Physician: No ref. provider found    PCP: Luz Dickson NP    Chief Complaint: Low Back Pain    Interval History (09/30/2022):  Patient returns to clinic after procedure.  Patient reports complete resolution of right lower extremity pain after right sacroiliac joint injection and right L5-S1 facet injection on 08/22/2022.  Primary pain today is tight aching pain in right side of lower back.  Pain is worse with extension and rotation, better with rest and heat.  Patient does report an episode of physical therapy on 09/21/2022 where she experienced increased muscle aches and weakness.  She reports that since this time her symptoms have greatly improved.  She denies any significant weakness today.  Pain is rated a 7/10. Denies any fevers, chills, changes in gait, weakness, or bowel and bladder incontinence        SUBJECTIVE:    Nadia Damon is a 67 y.o. female who presents to the clinic for the evaluation of lower back pain. The pain started 1 year ago and symptoms have been worsening.The pain is located in the right lower back and right buttocks area with radiation to the posterior lateral aspect of her right thigh and occasionally her right calf.  The pain is described as aching, shooting and stabbing and is rated as 5/10.   The pain is rated with a score of  2/10 on the BEST day and a score of 9/10 on the WORST day.  Symptoms interfere with daily activity and work. The pain is exacerbated by Sitting, Standing, Extension and Flexing.  The pain is mitigated by physical therapy and rest.     Patient denies night fever/night sweats, urinary incontinence, bowel incontinence, significant weight loss, significant motor weakness and loss of sensations.      Non-Pharmacologic Treatments:  Physical Therapy/Home Exercise: yes  Ice/Heat:yes  TENS: no  Acupuncture: no  Massage: no  Chiropractic: no        Previous Pain Medications:  Tylenol, topicals, neuropathic,       report:  Reviewed and consistent with medication use as prescribed.    Pain Procedures:   8/22/22:  Right sacroiliac joint and right L5-S1 facet injection,   Resolution of right lower extremity pain    Imaging:       Results for orders placed during the hospital encounter of 07/13/22    X-Ray Lumbar Spine 5 View    Narrative  EXAMINATION:  XR LUMBAR SPINE COMPLETE 5 VIEW    CLINICAL HISTORY:  Sacrococcygeal disorders, not elsewhere classified    TECHNIQUE:  AP, lateral, spot and bilateral oblique views of the lumbar spine were performed.    COMPARISON:  Lumbar spine radiographs May 17, 2018    FINDINGS:  Minimal grade 1 anterolisthesis of L4 on L5.  No fracture or pars defect.  Unchanged mild disc height loss at the L4-L5 level.  Moderate to severe degenerative disc disease at the T11-T12 level.  Prominent inferior lumbar spine facet arthropathy.  Sacroiliac joints appear normal.  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  No osseous erosion or suspicious osseous lesion.    Impression  As above.      Electronically signed by: Isac Bell  Date:    07/13/2022  Time:    15:39          Past Medical History:   Diagnosis Date    Abdominal wall hernia     CT Renal 6/11/2018---Small fat containing superior ventral abdominal wall hernia at the epicardial pacing lead site.    Abnormal mammogram 10/12/2021    Anxiety     Arthritis     ZEN HIPS    Breast cancer in female 08/2021    LEFT BREAST    C. difficile colitis 11/29/2021    Cellulitis of axilla, left 12/23/2021    Chronic diastolic heart failure 12/16/2021    Chronic kidney disease     stage 4, GFR 15-29 ml/min    Chronic midline low back pain without sciatica 06/18/2018    Closed nondisplaced fracture of distal phalanx of left great toe with routine healing 10/22/2018    Coronary artery disease 1993    heart transplant    Cystitis 5/10/2022    Depression     Fibromyalgia     on Lyrica    Heart failure      native heart cardiomyopathy    Heart transplanted 1993    due to cardiomyopathy    History of hyperparathyroidism; Hyperparathyroidism, secondary renal     PT DENIES    Hypertension     Immune disorder     anti rejection meds    Iron deficiency anemia 08/15/2017    Kidney stones     passed per pt    Obesity     Other osteoporosis without current pathological fracture 08/30/2019    Severe sepsis 11/22/2021    Shingles 2003 approx    left leg    Trouble in sleeping     Urinary incontinence      Past Surgical History:   Procedure Laterality Date    BLADDER SURGERY  2015 approx    mesh - Dr Everett then 2nd reconstructive sx Dr Onofre    BREAST BIOPSY Bilateral     NEGATIVE    BREAST LUMPECTOMY Left 2021    BREAST SURGERY Left 09/28/2015    Bx - benign    BREAST SURGERY Right 12/2015    Bx benign    CARDIAC PACEMAKER REMOVAL Left 06/26/2014    Pacer defirillator removed. Put in 1993 aat time of heart transplant    CARPAL TUNNEL RELEASE Left 03/03/2015    Dr. Hall    COLONOSCOPY N/A 02/25/2021    Procedure: COLONOSCOPY;  Surgeon: Freida Ramirez MD;  Location: [facility] ENDO;  Service: Endoscopy;  Laterality: N/A;    HEART TRANSPLANT  1993    HERNIA REPAIR Right 1971 approx    Inguinal    HYSTERECTOMY  1983    vag hyst /LSO     INCISION AND DRAINAGE OF ABSCESS Left 12/24/2021    Procedure: INCISION AND DRAINAGE, ABSCESS;  Surgeon: Joseph Longo MD;  Location: [facility] OR;  Service: General;  Laterality: Left;    INJECTION OF ANESTHETIC AGENT INTO SACROILIAC JOINT Right 08/22/2022    Procedure: Right SIJ Injection Right L5/S1 Facte Injection;  Surgeon: Jassi Pierre MD;  Location: [facility] MGT;  Service: Pain Management;  Laterality: Right;    INSERTION OF TUNNELED CENTRAL VENOUS CATHETER (CVC) WITH SUBCUTANEOUS PORT N/A 11/09/2021    Procedure: [id number]-[id number];  Surgeon: Christoph Douglas MD;  Location: [facility] OR;  Service: General;  Laterality: N/A;    REMOVAL OF VASCULAR ACCESS PORT      SENTINEL LYMPH NODE BIOPSY  Left 10/12/2021    Procedure: BIOPSY, LYMPH NODE, SENTINEL;  Surgeon: Christoph Douglas MD;  Location: [facility];  Service: General;  Laterality: Left;    TOE SURGERY       Social History     Socioeconomic History    Marital status: Single    Number of children: 2    Highest education level: 11th grade   Occupational History    Occupation: Retired   Tobacco Use    Smoking status: Never    Smokeless tobacco: Never   Substance and Sexual Activity    Alcohol use: Never     Alcohol/week: 0.0 standard drinks    Drug use: No    Sexual activity: Not Currently     Partners: Male     Birth control/protection: See Surgical Hx   Other Topics Concern    Are you pregnant or think you may be? No    Breast-feeding No   Social History Narrative    Single. 2 children , 1  at 30 yoa   strep throat -  pneumonia and renal complications after not completing course of AB. Other child lives in [place], [place]. Has a cousin locally that could help in an emergency. 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 Caffeine intake =- 1 cola a day. No coffee, + occasional tea, avoids caffeine especially at night. Still drives. She does not have a Living Will or Advanced directive.      Family History   Problem Relation Age of Onset    Cancer Mother 38        breast    Breast cancer Mother     Breast cancer Maternal Grandmother     Heart disease Maternal Grandmother     Hypertension Son     Cataracts Cousin     Diabetes Neg Hx     Stroke Neg Hx     Kidney disease Neg Hx     Asthma Neg Hx     COPD Neg Hx     Melanoma Neg Hx     Hyperlipidemia Neg Hx        Review of patient's allergies indicates:   Allergen Reactions    Lisinopril Swelling and Rash    Augmentin [amoxicillin-pot clavulanate] Diarrhea       Current Outpatient Medications   Medication Sig    acetaminophen (TYLENOL) 325 MG tablet Take 1 tablet (325 mg total) by mouth every 6 (six) hours as needed for Pain.    amLODIPine (NORVASC) 2.5 MG tablet     aspirin  (ECOTRIN) 81 MG EC tablet Take 1 tablet (81 mg total) by mouth once daily.    azelastine (ASTELIN) 137 mcg (0.1 %) nasal spray 2 sprays (274 mcg total) by Nasal route 2 (two) times daily.    biotin 10,000 mcg Cap Take 1 tablet by mouth once daily.    busPIRone (BUSPAR) 10 MG tablet Take 1 tablet (10 mg total) by mouth once daily.    calcitonin, salmon, (FORTICAL) 200 unit/actuation nasal spray 1 spray by Nasal route once daily. Alternate nostrils - give with calcium, vitamin D    carvediloL (COREG) 6.25 MG tablet Take 1 tablet (6.25 mg total) by mouth 2 (two) times daily with meals.    cycloSPORINE modified, NEORAL, (NEORAL) 25 MG capsule TAKE 3 CAPSULES (75 MG TOTAL) BY MOUTH 2 (TWO) TIMES DAILY.    DULoxetine (CYMBALTA) 30 MG capsule TAKE 1 CAPSULE EVERY DAY    EVENING PRIMROSE OIL ORAL Take 1,000 mg by mouth once daily.    furosemide (LASIX) 20 MG tablet Take 1 tablet (20 mg total) by mouth 2 (two) times a day for 5 days, THEN 1 tablet (20 mg total) once daily. Take 1 tablet daily.    hydrALAZINE (APRESOLINE) 50 MG tablet Take 1 tablet (50 mg total) by mouth every 8 (eight) hours. TAKE 1 TABLETS  EVERY 8  HOURS.    multivitamin capsule Take 1 capsule by mouth once daily.    ondansetron (ZOFRAN-ODT) 8 MG TbDL Take 1 tablet (8 mg total) by mouth every 8 (eight) hours as needed (nausea).    pantoprazole (PROTONIX) 40 MG tablet TAKE 1 TABLET EVERY DAY    pregabalin (LYRICA) 50 MG capsule TAKE 1 CAPSULE 2 TIMES DAILY AT NOON AND NIGHT TIME    RETACRIT 20,000 unit/mL injection     temazepam (RESTORIL) 30 mg capsule Take 1 at bedtime    vitamin E 400 UNIT capsule Take 400 Units by mouth once daily.    zolpidem (AMBIEN) 10 mg Tab TAKE 1 TABLET BY MOUTH ONCE DAILY IN THE EVENING    tiZANidine (ZANAFLEX) 4 MG tablet Take 1 tablet (4 mg total) by mouth 2 (two) times daily as needed (Pain).     Current Facility-Administered Medications   Medication    denosumab (PROLIA) injection 60 mg     Facility-Administered Medications  "Ordered in Other Visits   Medication    lactated ringers infusion         ROS  Review of Systems   Constitutional:  Negative for chills, diaphoresis, fatigue and fever.   Respiratory:  Negative for chest tightness, shortness of breath, wheezing and stridor.    Cardiovascular:  Positive for leg swelling. Negative for chest pain.   Gastrointestinal:  Negative for blood in stool, diarrhea, nausea and vomiting.   Endocrine: Negative for cold intolerance and heat intolerance.   Genitourinary:  Negative for dysuria, hematuria and urgency.   Musculoskeletal:  Positive for arthralgias, back pain and myalgias. Negative for gait problem, joint swelling, neck pain and neck stiffness.   Skin:  Negative for rash.   Neurological:  Negative for tremors, seizures, speech difficulty, weakness, light-headedness, numbness and headaches.   Hematological:  Does not bruise/bleed easily.   Psychiatric/Behavioral:  Negative for agitation, confusion and suicidal ideas. The patient is not nervous/anxious.           OBJECTIVE:  BP (!) 155/97   Pulse 97   Resp 17   Ht 5' 2" (1.575 m)   Wt 78.8 kg (173 lb 11.6 oz)   LMP 06/20/1983 (Within Years)   BMI 31.77 kg/m²         Physical Exam  Constitutional:       General: She is not in acute distress.     Appearance: Normal appearance. She is not ill-appearing.   HENT:      Head: Normocephalic and atraumatic.      Nose: No congestion or rhinorrhea.      Mouth/Throat:      Mouth: Mucous membranes are moist.   Eyes:      Extraocular Movements: Extraocular movements intact.      Pupils: Pupils are equal, round, and reactive to light.   Cardiovascular:      Pulses: Normal pulses.   Pulmonary:      Effort: Pulmonary effort is normal.   Abdominal:      General: Abdomen is flat.      Palpations: Abdomen is soft.   Musculoskeletal:      Cervical back: Normal range of motion and neck supple.   Skin:     General: Skin is warm and dry.      Capillary Refill: Capillary refill takes less than 2 seconds. "   Neurological:      General: No focal deficit present.      Mental Status: She is alert and oriented to person, place, and time.      Cranial Nerves: No cranial nerve deficit.      Sensory: No sensory deficit.      Motor: No weakness or abnormal muscle tone.      Gait: Gait normal.      Deep Tendon Reflexes: Babinski sign absent on the right side. Babinski sign absent on the left side.      Reflex Scores:       Patellar reflexes are 2+ on the right side and 2+ on the left side.       Achilles reflexes are 2+ on the right side and 2+ on the left side.  Psychiatric:         Mood and Affect: Mood normal.         Behavior: Behavior normal.         Thought Content: Thought content normal.         Musculoskeletal:        Lumbar Exam  Incision: no  Pain with Flexion: yes  Pain with Extension: yes  ROM:  Decreased secondary to pain  Paraspinous TTP:  Right greater than left  Facet TTP:  L5-S1  Facet Loading:  Positive on the right  SLR:  Negative bilaterally  SIJ TTP:  Negative bilaterally  ALBERTO:  Negative bilaterally      LABS:  Lab Results   Component Value Date    WBC 3.39 (L) 09/30/2022    HGB 10.8 (L) 09/30/2022    HCT 33.9 (L) 09/30/2022    MCV 88 09/30/2022     09/30/2022       CMP  Sodium   Date Value Ref Range Status   09/30/2022 142 136 - 145 mmol/L Final     Potassium   Date Value Ref Range Status   09/30/2022 4.5 3.5 - 5.1 mmol/L Final     Chloride   Date Value Ref Range Status   09/30/2022 107 95 - 110 mmol/L Final     CO2   Date Value Ref Range Status   09/30/2022 22 (L) 23 - 29 mmol/L Final     Glucose   Date Value Ref Range Status   09/30/2022 130 (H) 70 - 110 mg/dL Final     BUN   Date Value Ref Range Status   09/30/2022 34 (H) 8 - 23 mg/dL Final     Creatinine   Date Value Ref Range Status   09/30/2022 2.6 (H) 0.5 - 1.4 mg/dL Final     Calcium   Date Value Ref Range Status   09/30/2022 8.3 (L) 8.7 - 10.5 mg/dL Final     Total Protein   Date Value Ref Range Status   09/30/2022 7.3 6.0 - 8.4 g/dL  Final     Albumin   Date Value Ref Range Status   09/30/2022 3.5 3.5 - 5.2 g/dL Final     Total Bilirubin   Date Value Ref Range Status   09/30/2022 0.5 0.1 - 1.0 mg/dL Final     Comment:     For infants and newborns, interpretation of results should be based  on gestational age, weight and in agreement with clinical  observations.    Premature Infant recommended reference ranges:  Up to 24 hours.............<8.0 mg/dL  Up to 48 hours............<12.0 mg/dL  3-5 days..................<15.0 mg/dL  6-29 days.................<15.0 mg/dL       Alkaline Phosphatase   Date Value Ref Range Status   09/30/2022 152 (H) 55 - 135 U/L Final     AST   Date Value Ref Range Status   09/30/2022 30 10 - 40 U/L Final     ALT   Date Value Ref Range Status   09/30/2022 26 10 - 44 U/L Final     Anion Gap   Date Value Ref Range Status   09/30/2022 13 8 - 16 mmol/L Final     eGFR if    Date Value Ref Range Status   07/14/2022 19 (A) >60 mL/min/1.73 m^2 Final     eGFR if non    Date Value Ref Range Status   07/14/2022 17 (A) >60 mL/min/1.73 m^2 Final     Comment:     Calculation used to obtain the estimated glomerular filtration  rate (eGFR) is the CKD-EPI equation.          Lab Results   Component Value Date    HGBA1C 5.2 12/24/2021             ASSESSMENT:       67 y.o. year old female with lower back pain, consistent with     1. Lumbar spondylosis  Case Request-RAD/Other Procedure Area: Right L4/L5 and L5/S1 MBB    IR Facet Inj Lumbar 1st Vert Uni    IR Facet Inj Lumbar 2nd Vert Uni    DISCONTINUED: tiZANidine (ZANAFLEX) 4 MG tablet      2. Lumbar radiculopathy        3. Chronic pain disorder        4. Sacroiliitis        5. Heart transplanted          Lumbar spondylosis  -     Discontinue: tiZANidine (ZANAFLEX) 4 MG tablet; Take 1 tablet (4 mg total) by mouth 2 (two) times daily as needed (Pain).  Dispense: 60 tablet; Refill: 1  -     Case Request-RAD/Other Procedure Area: Right L4/L5 and L5/S1 MBB  -      IR Facet Inj Lumbar 1st Vert Uni; Future; Expected date: 09/30/2022  -     IR Facet Inj Lumbar 2nd Vert Uni; Future; Expected date: 09/30/2022    Lumbar radiculopathy    Chronic pain disorder    Sacroiliitis    Heart transplanted           PLAN:   - Interventions:    We will schedule patient for right L4-5 and L5-S1 medial branch block for facet mediated disease.  If patient has 2 positive medial branch blocks, we will proceed with RFA  -8/22/22:  Right sacroiliac joint and right L5-S1 facet injection,   Resolution of right lower extremity pain  - Anticoagulation use:   no no anticoagulation    - Medications:   Continue Tylenol, Cymbalta, and Lyrica as prescribed by primary care   Discontinue Robaxin and start tizanidine 4 mg twice a day as needed    - Therapy:    Patient reports 1 episode of increased muscle pain and weakness at physical therapy on 09/21/2022.  She reports significant improvement since this time.  Neuro exam is normal today in clinic.  No need for repeat imaging.  We will make sure all of her lab work is normal before she returns physical therapy.    - Imaging:   X-ray Lumbar  reviewed and findings discussed the patient.  Significant for grade 1 anterolisthesis of L4 upon L5.  Degenerative disc disease at T11-T12.  As well as pseudo joint formation of right L5 transverse process with superior sacrum   Can consider CT of lumbar spine if lower back pain continues    - Consults:   Continue follow-up with cardiology for previous heart transplant    - Counseled patient regarding the importance of activity modification and physical therapy    - Patient Questions: Answered all of the patient's questions regarding diagnosis, therapy, and treatment    - Follow up visit:  Patient will be called after 1st medial branch block        The above plan and management options were discussed at length with patient. Patient is in agreement with the above and verbalized understanding.    I discussed the goals of  interventional chronic pain management with the patient on today's visit.  I explained the utility of injections for diagnostic and therapeutic purposes.  We discussed a multimodal approach to pain including treating the patient's given worst pain at any given time.  We will use a systematic approach to addressing pain.  We will also adopt a multimodal approach that includes injections, adjuvant medications, physical therapy, at times psychiatry.  There may be a limited role for opioid use intermittently in the treatment of pain, more particularly for acute pain although no one approach can be used as a sole treatment modality.    I emphasized the importance of regular exercise, core strengthening and stretching, diet and weight loss as a cornerstone of long-term pain management.      Jassi Pierre MD  Interventional Pain Management  [facility]    Disclaimer:  This note was prepared using voice recognition system and is likely to have sound alike errors that may have been overlooked even after proof reading.  Please call me with any questions

## 2022-09-30 NOTE — TELEPHONE ENCOUNTER
Call patient to inform them that they can  their medication from the [facility] pharmacy. P.t  understand. All question answered.       Kye Mejia   Medical Assistant

## 2022-10-04 ENCOUNTER — LAB VISIT (OUTPATIENT)
Dept: LAB | Facility: HOSPITAL | Age: 67
End: 2022-10-04
Attending: INTERNAL MEDICINE
Payer: MEDICARE

## 2022-10-04 ENCOUNTER — OFFICE VISIT (OUTPATIENT)
Dept: PRIMARY CARE CLINIC | Facility: CLINIC | Age: 67
End: 2022-10-04
Payer: MEDICARE

## 2022-10-04 VITALS
WEIGHT: 173.31 LBS | TEMPERATURE: 98 F | HEIGHT: 62 IN | DIASTOLIC BLOOD PRESSURE: 84 MMHG | BODY MASS INDEX: 31.89 KG/M2 | OXYGEN SATURATION: 98 % | SYSTOLIC BLOOD PRESSURE: 153 MMHG | HEART RATE: 76 BPM

## 2022-10-04 DIAGNOSIS — E78.2 MIXED HYPERLIPIDEMIA: ICD-10-CM

## 2022-10-04 DIAGNOSIS — N60.01 CYST OF RIGHT BREAST: ICD-10-CM

## 2022-10-04 DIAGNOSIS — R06.02 SHORTNESS OF BREATH: ICD-10-CM

## 2022-10-04 DIAGNOSIS — I10 ESSENTIAL HYPERTENSION: ICD-10-CM

## 2022-10-04 DIAGNOSIS — T86.20 COMPLICATION OF HEART TRANSPLANT, UNSPECIFIED COMPLICATION: ICD-10-CM

## 2022-10-04 DIAGNOSIS — Z79.899 ENCOUNTER FOR LONG-TERM (CURRENT) USE OF MEDICATIONS: ICD-10-CM

## 2022-10-04 DIAGNOSIS — Z94.1 STATUS POST HEART TRANSPLANT: ICD-10-CM

## 2022-10-04 DIAGNOSIS — Z79.52 LONG TERM CURRENT USE OF SYSTEMIC STEROIDS: ICD-10-CM

## 2022-10-04 LAB
ALBUMIN SERPL BCP-MCNC: 3.4 G/DL (ref 3.5–5.2)
ALP SERPL-CCNC: 130 U/L (ref 55–135)
ALT SERPL W/O P-5'-P-CCNC: 21 U/L (ref 10–44)
ANION GAP SERPL CALC-SCNC: 11 MMOL/L (ref 8–16)
AST SERPL-CCNC: 25 U/L (ref 10–40)
BASOPHILS # BLD AUTO: 0.03 K/UL (ref 0–0.2)
BASOPHILS NFR BLD: 1 % (ref 0–1.9)
BILIRUB SERPL-MCNC: 0.6 MG/DL (ref 0.1–1)
BNP SERPL-MCNC: 259 PG/ML (ref 0–99)
BUN SERPL-MCNC: 39 MG/DL (ref 8–23)
CALCIUM SERPL-MCNC: 8.9 MG/DL (ref 8.7–10.5)
CHLORIDE SERPL-SCNC: 105 MMOL/L (ref 95–110)
CHOLEST SERPL-MCNC: 193 MG/DL (ref 120–199)
CHOLEST/HDLC SERPL: 4.2 {RATIO} (ref 2–5)
CO2 SERPL-SCNC: 23 MMOL/L (ref 23–29)
CREAT SERPL-MCNC: 2.7 MG/DL (ref 0.5–1.4)
DIFFERENTIAL METHOD: ABNORMAL
EOSINOPHIL # BLD AUTO: 0.1 K/UL (ref 0–0.5)
EOSINOPHIL NFR BLD: 3.4 % (ref 0–8)
ERYTHROCYTE [DISTWIDTH] IN BLOOD BY AUTOMATED COUNT: 15.9 % (ref 11.5–14.5)
EST. GFR  (NO RACE VARIABLE): 18.6 ML/MIN/1.73 M^2
GLUCOSE SERPL-MCNC: 94 MG/DL (ref 70–110)
HCT VFR BLD AUTO: 32.3 % (ref 37–48.5)
HDLC SERPL-MCNC: 46 MG/DL (ref 40–75)
HDLC SERPL: 23.8 % (ref 20–50)
HGB BLD-MCNC: 9.9 G/DL (ref 12–16)
IMM GRANULOCYTES # BLD AUTO: 0.01 K/UL (ref 0–0.04)
IMM GRANULOCYTES NFR BLD AUTO: 0.3 % (ref 0–0.5)
LDLC SERPL CALC-MCNC: 115.2 MG/DL (ref 63–159)
LYMPHOCYTES # BLD AUTO: 0.7 K/UL (ref 1–4.8)
LYMPHOCYTES NFR BLD: 23.6 % (ref 18–48)
MCH RBC QN AUTO: 28 PG (ref 27–31)
MCHC RBC AUTO-ENTMCNC: 30.7 G/DL (ref 32–36)
MCV RBC AUTO: 92 FL (ref 82–98)
MONOCYTES # BLD AUTO: 0.5 K/UL (ref 0.3–1)
MONOCYTES NFR BLD: 16.2 % (ref 4–15)
NEUTROPHILS # BLD AUTO: 1.7 K/UL (ref 1.8–7.7)
NEUTROPHILS NFR BLD: 55.5 % (ref 38–73)
NONHDLC SERPL-MCNC: 147 MG/DL
NRBC BLD-RTO: 0 /100 WBC
PLATELET # BLD AUTO: 242 K/UL (ref 150–450)
PMV BLD AUTO: 10.3 FL (ref 9.2–12.9)
POTASSIUM SERPL-SCNC: 5.2 MMOL/L (ref 3.5–5.1)
PROT SERPL-MCNC: 6.8 G/DL (ref 6–8.4)
RBC # BLD AUTO: 3.53 M/UL (ref 4–5.4)
SODIUM SERPL-SCNC: 139 MMOL/L (ref 136–145)
TRIGL SERPL-MCNC: 159 MG/DL (ref 30–150)
WBC # BLD AUTO: 2.97 K/UL (ref 3.9–12.7)

## 2022-10-04 PROCEDURE — 3079F PR MOST RECENT DIASTOLIC BLOOD PRESSURE 80-89 MM HG: ICD-10-PCS | Mod: CPTII,S$GLB,, | Performed by: NURSE PRACTITIONER

## 2022-10-04 PROCEDURE — 83880 ASSAY OF NATRIURETIC PEPTIDE: CPT | Performed by: INTERNAL MEDICINE

## 2022-10-04 PROCEDURE — 80061 LIPID PANEL: CPT | Performed by: INTERNAL MEDICINE

## 2022-10-04 PROCEDURE — 3008F BODY MASS INDEX DOCD: CPT | Mod: CPTII,S$GLB,, | Performed by: NURSE PRACTITIONER

## 2022-10-04 PROCEDURE — 36415 COLL VENOUS BLD VENIPUNCTURE: CPT | Performed by: INTERNAL MEDICINE

## 2022-10-04 PROCEDURE — 80053 COMPREHEN METABOLIC PANEL: CPT | Performed by: INTERNAL MEDICINE

## 2022-10-04 PROCEDURE — 99215 OFFICE O/P EST HI 40 MIN: CPT | Mod: S$GLB,,, | Performed by: NURSE PRACTITIONER

## 2022-10-04 PROCEDURE — 1160F RVW MEDS BY RX/DR IN RCRD: CPT | Mod: CPTII,S$GLB,, | Performed by: NURSE PRACTITIONER

## 2022-10-04 PROCEDURE — 3066F NEPHROPATHY DOC TX: CPT | Mod: CPTII,S$GLB,, | Performed by: NURSE PRACTITIONER

## 2022-10-04 PROCEDURE — 1125F PR PAIN SEVERITY QUANTIFIED, PAIN PRESENT: ICD-10-PCS | Mod: CPTII,S$GLB,, | Performed by: NURSE PRACTITIONER

## 2022-10-04 PROCEDURE — 4010F PR ACE/ARB THEARPY RXD/TAKEN: ICD-10-PCS | Mod: CPTII,S$GLB,, | Performed by: NURSE PRACTITIONER

## 2022-10-04 PROCEDURE — 3077F PR MOST RECENT SYSTOLIC BLOOD PRESSURE >= 140 MM HG: ICD-10-PCS | Mod: CPTII,S$GLB,, | Performed by: NURSE PRACTITIONER

## 2022-10-04 PROCEDURE — 1160F PR REVIEW ALL MEDS BY PRESCRIBER/CLIN PHARMACIST DOCUMENTED: ICD-10-PCS | Mod: CPTII,S$GLB,, | Performed by: NURSE PRACTITIONER

## 2022-10-04 PROCEDURE — 1159F MED LIST DOCD IN RCRD: CPT | Mod: CPTII,S$GLB,, | Performed by: NURSE PRACTITIONER

## 2022-10-04 PROCEDURE — 3079F DIAST BP 80-89 MM HG: CPT | Mod: CPTII,S$GLB,, | Performed by: NURSE PRACTITIONER

## 2022-10-04 PROCEDURE — 4010F ACE/ARB THERAPY RXD/TAKEN: CPT | Mod: CPTII,S$GLB,, | Performed by: NURSE PRACTITIONER

## 2022-10-04 PROCEDURE — 99215 PR OFFICE/OUTPT VISIT, EST, LEVL V, 40-54 MIN: ICD-10-PCS | Mod: S$GLB,,, | Performed by: NURSE PRACTITIONER

## 2022-10-04 PROCEDURE — 1159F PR MEDICATION LIST DOCUMENTED IN MEDICAL RECORD: ICD-10-PCS | Mod: CPTII,S$GLB,, | Performed by: NURSE PRACTITIONER

## 2022-10-04 PROCEDURE — 1157F ADVNC CARE PLAN IN RCRD: CPT | Mod: CPTII,S$GLB,, | Performed by: NURSE PRACTITIONER

## 2022-10-04 PROCEDURE — 99999 PR PBB SHADOW E&M-EST. PATIENT-LVL IV: ICD-10-PCS | Mod: PBBFAC,,, | Performed by: NURSE PRACTITIONER

## 2022-10-04 PROCEDURE — 80158 DRUG ASSAY CYCLOSPORINE: CPT | Performed by: INTERNAL MEDICINE

## 2022-10-04 PROCEDURE — 3008F PR BODY MASS INDEX (BMI) DOCUMENTED: ICD-10-PCS | Mod: CPTII,S$GLB,, | Performed by: NURSE PRACTITIONER

## 2022-10-04 PROCEDURE — 3288F PR FALLS RISK ASSESSMENT DOCUMENTED: ICD-10-PCS | Mod: CPTII,S$GLB,, | Performed by: NURSE PRACTITIONER

## 2022-10-04 PROCEDURE — 99999 PR PBB SHADOW E&M-EST. PATIENT-LVL IV: CPT | Mod: PBBFAC,,, | Performed by: NURSE PRACTITIONER

## 2022-10-04 PROCEDURE — 1101F PR PT FALLS ASSESS DOC 0-1 FALLS W/OUT INJ PAST YR: ICD-10-PCS | Mod: CPTII,S$GLB,, | Performed by: NURSE PRACTITIONER

## 2022-10-04 PROCEDURE — 85025 COMPLETE CBC W/AUTO DIFF WBC: CPT | Performed by: INTERNAL MEDICINE

## 2022-10-04 PROCEDURE — 3077F SYST BP >= 140 MM HG: CPT | Mod: CPTII,S$GLB,, | Performed by: NURSE PRACTITIONER

## 2022-10-04 PROCEDURE — 1157F PR ADVANCE CARE PLAN OR EQUIV PRESENT IN MEDICAL RECORD: ICD-10-PCS | Mod: CPTII,S$GLB,, | Performed by: NURSE PRACTITIONER

## 2022-10-04 PROCEDURE — 1101F PT FALLS ASSESS-DOCD LE1/YR: CPT | Mod: CPTII,S$GLB,, | Performed by: NURSE PRACTITIONER

## 2022-10-04 PROCEDURE — 3288F FALL RISK ASSESSMENT DOCD: CPT | Mod: CPTII,S$GLB,, | Performed by: NURSE PRACTITIONER

## 2022-10-04 PROCEDURE — 1125F AMNT PAIN NOTED PAIN PRSNT: CPT | Mod: CPTII,S$GLB,, | Performed by: NURSE PRACTITIONER

## 2022-10-04 PROCEDURE — 3066F PR DOCUMENTATION OF TREATMENT FOR NEPHROPATHY: ICD-10-PCS | Mod: CPTII,S$GLB,, | Performed by: NURSE PRACTITIONER

## 2022-10-04 NOTE — PROGRESS NOTES
Nadia Damon  10/05/2022  [id number]    Luz Dickson NP  Patient Care Team:  Luz Dickson NP as PCP - General (Family Medicine)  Miguel Soni Jr., MD as Consulting Physician (Vascular Surgery)  Ike King MD as Consulting Physician (Cardiology)  Courtney Tubbs MD as Consulting Physician (Cardiology)  Carter Crawford MD as Consulting Physician (Nephrology)  Paxton Vasques OD as Consulting Physician (Optometry)  PRANAY Villalobos MD as Obstetrician (Obstetrics)  Parker Mccarthy IV, MD (Urology)  Ike King MD as Consulting Physician (Cardiology)  Jose Roland MD as Consulting Physician (Dermatology)  Prasanth Johnson MD as Consulting Physician (Rheumatology)  Ayanna Hart RN as Oncology Navigator  Nora Morin LCSW as   Elis Wick MD as Physician (Internal Medicine)      [facility] Note      Chief Complaint:  Chief Complaint   Patient presents with    Follow-up     Patient in for follow up and lab results with a complaint of pain in her right side, patient will have a Lumbar MBB performed by Dr Pierre on 10/19/2022, patient had a mammogram performed on 9/19/2022 and a cyst was found in her right breast and she has to get a biopsy on it once they call her in to see if it's liquids or solid.        History of Present Illness:  HPI    PT reported increased back pain. Saw Dr Pierre yesterday. Planning L4/L5 L5/S1 MBB 10/19/22. Some flank pain noted yesterday. Better relief with tizanidine. Minimal relief with heat. No dysuria.     She is very concerned about right breast cysts. She is hoping to have biopsy. Pt volunteers that if cancer has returned she would not want any chemotherapy or radiation therapy. She would consider mastectomy or lumpectomy if beneficial.           Review of Systems   Constitutional:  Negative for chills, fever, malaise/fatigue and weight loss.   Eyes:  Negative for blurred vision.   Respiratory:  Negative for cough  and shortness of breath.    Cardiovascular:  Positive for leg swelling. Negative for chest pain and palpitations.   Gastrointestinal:  Negative for abdominal pain, constipation, diarrhea, nausea and vomiting.   Genitourinary:  Negative for dysuria, frequency, hematuria and urgency.   Musculoskeletal:  Positive for back pain. Negative for falls.   Neurological:  Negative for dizziness and headaches.   Psychiatric/Behavioral:  The patient does not have insomnia.         Sad about illness       The following were reviewed: Active problem list, medication list, allergies, family history, social history, and Health Maintenance.     History:  Past Medical History:   Diagnosis Date    Abdominal wall hernia     CT Renal 6/11/2018---Small fat containing superior ventral abdominal wall hernia at the epicardial pacing lead site.    Abnormal mammogram 10/12/2021    Anxiety     Arthritis     ZEN HIPS    Breast cancer in female 08/2021    LEFT BREAST    C. difficile colitis 11/29/2021    Cellulitis of axilla, left 12/23/2021    Chronic diastolic heart failure 12/16/2021    Chronic kidney disease     stage 4, GFR 15-29 ml/min    Chronic midline low back pain without sciatica 06/18/2018    Closed nondisplaced fracture of distal phalanx of left great toe with routine healing 10/22/2018    Coronary artery disease 1993    heart transplant    Cystitis 5/10/2022    Depression     Fibromyalgia     on Lyrica    Heart failure     native heart cardiomyopathy    Heart transplanted 1993    due to cardiomyopathy    History of hyperparathyroidism; Hyperparathyroidism, secondary renal     PT DENIES    Hypertension     Immune disorder     anti rejection meds    Iron deficiency anemia 08/15/2017    Kidney stones     passed per pt    Obesity     Other osteoporosis without current pathological fracture 08/30/2019    Severe sepsis 11/22/2021    Shingles 2003 approx    left leg    Trouble in sleeping     Urinary incontinence      Past Surgical  History:   Procedure Laterality Date    BLADDER SURGERY  2015 approx    mesh - Dr Everett then 2nd reconstructive sx Dr Onofre    BREAST BIOPSY Bilateral     NEGATIVE    BREAST LUMPECTOMY Left 2021    BREAST SURGERY Left 09/28/2015    Bx - benign    BREAST SURGERY Right 12/2015    Bx benign    CARDIAC PACEMAKER REMOVAL Left 06/26/2014    Pacer defirillator removed. Put in 1993 aat time of heart transplant    CARPAL TUNNEL RELEASE Left 03/03/2015    Dr. Hall    COLONOSCOPY N/A 02/25/2021    Procedure: COLONOSCOPY;  Surgeon: Freida Ramirez MD;  Location: [facility] ENDO;  Service: Endoscopy;  Laterality: N/A;    HEART TRANSPLANT  1993    HERNIA REPAIR Right 1971 approx    Inguinal    HYSTERECTOMY  1983    vag hyst /LSO     INCISION AND DRAINAGE OF ABSCESS Left 12/24/2021    Procedure: INCISION AND DRAINAGE, ABSCESS;  Surgeon: Joseph Longo MD;  Location: [facility] OR;  Service: General;  Laterality: Left;    INJECTION OF ANESTHETIC AGENT INTO SACROILIAC JOINT Right 08/22/2022    Procedure: Right SIJ Injection Right L5/S1 Facte Injection;  Surgeon: Jassi Pierre MD;  Location: [facility] PAIN MGT;  Service: Pain Management;  Laterality: Right;    INSERTION OF TUNNELED CENTRAL VENOUS CATHETER (CVC) WITH SUBCUTANEOUS PORT N/A 11/09/2021    Procedure: [id number];  Surgeon: Christoph Douglas MD;  Location: [facility] OR;  Service: General;  Laterality: N/A;    REMOVAL OF VASCULAR ACCESS PORT      SENTINEL LYMPH NODE BIOPSY Left 10/12/2021    Procedure: BIOPSY, LYMPH NODE, SENTINEL;  Surgeon: Christoph Douglas MD;  Location: [facility] OR;  Service: General;  Laterality: Left;    TOE SURGERY       Family History   Problem Relation Age of Onset    Cancer Mother 38        breast    Breast cancer Mother     Breast cancer Maternal Grandmother     Heart disease Maternal Grandmother     Hypertension Son     Cataracts Cousin     Diabetes Neg Hx     Stroke Neg Hx     Kidney disease Neg Hx     Asthma Neg Hx     COPD Neg Hx     Melanoma Neg Hx      Hyperlipidemia Neg Hx      Social History     Socioeconomic History    Marital status: Single    Number of children: 2    Highest education level: 11th grade   Occupational History    Occupation: Retired   Tobacco Use    Smoking status: Never    Smokeless tobacco: Never   Substance and Sexual Activity    Alcohol use: Never     Alcohol/week: 0.0 standard drinks    Drug use: No    Sexual activity: Not Currently     Partners: Male     Birth control/protection: See Surgical Hx   Other Topics Concern    Are you pregnant or think you may be? No    Breast-feeding No   Social History Narrative    Single. 2 children , 1  at 30 yo2014 strep throat -  pneumonia and renal complications after not completing course of AB. Other child lives in [place], [place]. Has a cousin locally that could help in an emergency. 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 Caffeine intake =- 1 cola a day. No coffee, + occasional tea, avoids caffeine especially at night. Still drives. She does not have a Living Will or Advanced directive.      Patient Active Problem List   Diagnosis    Heart transplanted    Anxiety    Insomnia    CKD (chronic kidney disease) stage 4, GFR 15-29 ml/min    Immunosuppression due to drug therapy    Fibromyalgia    Gastroesophageal reflux disease without esophagitis    Breast screening    Immunization deficiency    Essential hypertension    Aortic atherosclerosis    Chronic major depressive disorder, recurrent episode    Iron deficiency anemia    Vaginal atrophy    Hyperparathyroidism    Hx of falling    Chronic midline low back pain without sciatica    Lightheadedness    Medication side effects    Obesity (BMI 30.0-34.9)    Edema    Asymptomatic menopausal state     Other osteoporosis without current pathological fracture    Cough    Abnormal CT scan, kidney    Weakness of both lower extremities    Immunocompromised patient    Osteoporosis, post-menopausal    Ductal carcinoma in situ  (DCIS) of left breast    Immunodeficiency secondary to radiation therapy    The hangs, uncomplicated    GRACE (acute kidney injury)    Diarrhea    Thrombocytopenia, unspecified    Immunodeficiency due to chemotherapy    Strain of left shoulder    Left shoulder pain    Ulnar neuropathy of left upper extremity    Anemia associated with stage 4 chronic renal failure    Secondary and unspecified malignant neoplasm of axilla and upper limb lymph nodes    Other chest pain    Abnormal thyroid function test    Hypomagnesemia    Slow transit constipation    Other hyperlipidemia    Sacroiliac joint pain    Impaired mobility    Cyst of right breast     Review of patient's allergies indicates:   Allergen Reactions    Lisinopril Swelling and Rash    Augmentin [amoxicillin-pot clavulanate] Diarrhea       Medications:  Current Outpatient Medications on File Prior to Visit   Medication Sig Dispense Refill    amLODIPine (NORVASC) 2.5 MG tablet       aspirin (ECOTRIN) 81 MG EC tablet Take 1 tablet (81 mg total) by mouth once daily. 90 tablet 3    biotin 10,000 mcg Cap Take 1 tablet by mouth once daily.      busPIRone (BUSPAR) 10 MG tablet Take 1 tablet (10 mg total) by mouth once daily. 90 tablet 3    calcitonin, salmon, (FORTICAL) 200 unit/actuation nasal spray 1 spray by Nasal route once daily. Alternate nostrils - give with calcium, vitamin D 3.7 mL 2    carvediloL (COREG) 6.25 MG tablet Take 1 tablet (6.25 mg total) by mouth 2 (two) times daily with meals. 180 tablet 3    DULoxetine (CYMBALTA) 30 MG capsule TAKE 1 CAPSULE EVERY DAY 90 capsule 1    EVENING PRIMROSE OIL ORAL Take 1,000 mg by mouth once daily.      furosemide (LASIX) 20 MG tablet Take 1 tablet (20 mg total) by mouth 2 (two) times a day for 5 days, THEN 1 tablet (20 mg total) once daily. Take 1 tablet daily. 370 tablet 0    hydrALAZINE (APRESOLINE) 50 MG tablet Take 1 tablet (50 mg total) by mouth every 8 (eight) hours. TAKE 1 TABLETS  EVERY 8  HOURS. 270 tablet 3     multivitamin capsule Take 1 capsule by mouth once daily.      pantoprazole (PROTONIX) 40 MG tablet TAKE 1 TABLET EVERY DAY 90 tablet 1    pregabalin (LYRICA) 50 MG capsule TAKE 1 CAPSULE 2 TIMES DAILY AT NOON AND NIGHT TIME 180 capsule 1    RETACRIT 20,000 unit/mL injection       temazepam (RESTORIL) 30 mg capsule Take 1 at bedtime 90 capsule 1    tiZANidine (ZANAFLEX) 4 MG tablet Take 1 tablet (4 mg total) by mouth 2 (two) times daily as needed (Pain). 60 tablet 1    vitamin E 400 UNIT capsule Take 400 Units by mouth once daily.      zolpidem (AMBIEN) 10 mg Tab TAKE 1 TABLET BY MOUTH ONCE DAILY IN THE EVENING 90 tablet 1    cycloSPORINE modified, NEORAL, (NEORAL) 25 MG capsule TAKE 3 CAPSULES (75 MG TOTAL) BY MOUTH 2 (TWO) TIMES DAILY. (Patient not taking: Reported on 10/4/2022) 540 capsule 6    [DISCONTINUED] acetaminophen (TYLENOL) 325 MG tablet Take 1 tablet (325 mg total) by mouth every 6 (six) hours as needed for Pain. (Patient not taking: Reported on 10/4/2022)  0    [DISCONTINUED] azelastine (ASTELIN) 137 mcg (0.1 %) nasal spray 2 sprays (274 mcg total) by Nasal route 2 (two) times daily. (Patient not taking: Reported on 10/4/2022) 30 mL 6    [DISCONTINUED] ondansetron (ZOFRAN-ODT) 8 MG TbDL Take 1 tablet (8 mg total) by mouth every 8 (eight) hours as needed (nausea). (Patient not taking: Reported on 10/4/2022) 60 tablet 5     Current Facility-Administered Medications on File Prior to Visit   Medication Dose Route Frequency Provider Last Rate Last Admin    denosumab (PROLIA) injection 60 mg  60 mg Subcutaneous Q6 Months Prasanth Johnson MD        lactated ringers infusion   Intravenous Continuous Jennifer Carr MD 10 mL/hr at 11/09/21 0717 Restarted at 11/09/21 0820       Medications have been reviewed and reconciled with patient at visit today.    Barriers to medications present (no )    Fall since last office visit (no )      Exam:  Vitals:    10/04/22 1054   BP: (!) 153/84   Pulse: 76   Temp: 97.9 °F (36.6  °C)     Weight: 78.6 kg (173 lb 4.8 oz)   Body mass index is 31.7 kg/m².      BP Readings from Last 3 Encounters:   10/04/22 (!) 153/84   09/30/22 (!) 155/97   09/12/22 (!) 147/78     Wt Readings from Last 3 Encounters:   10/04/22 1054 78.6 kg (173 lb 4.8 oz)   09/30/22 1022 78.8 kg (173 lb 11.6 oz)   09/01/22 1056 77.5 kg (170 lb 13.7 oz)            Physical Exam  Constitutional:       General: She is not in acute distress.  HENT:      Nose: Nose normal.      Mouth/Throat:      Mouth: Mucous membranes are moist.      Pharynx: No oropharyngeal exudate.   Eyes:      General: No scleral icterus.  Cardiovascular:      Rate and Rhythm: Normal rate and regular rhythm.   Pulmonary:      Effort: Pulmonary effort is normal.      Breath sounds: Normal breath sounds.   Abdominal:      General: There is no distension.      Palpations: Abdomen is soft.      Tenderness: There is no abdominal tenderness. There is no right CVA tenderness or left CVA tenderness.   Musculoskeletal:         General: Swelling (mild BLE) present.      Thoracic back: Tenderness present. No swelling, deformity, signs of trauma, spasms or bony tenderness. Normal range of motion.        Back:       Comments: Pain reproduced, no deformity, worse with AROM   Lymphadenopathy:      Cervical: No cervical adenopathy.   Skin:     General: Skin is warm and dry.   Neurological:      Mental Status: She is alert. Mental status is at baseline.      Motor: No weakness.      Gait: Gait normal.   Psychiatric:         Mood and Affect: Mood normal.         Behavior: Behavior normal.         Judgment: Judgment normal.       Laboratory Reviewed: (Yes)  Lab Results   Component Value Date    WBC 2.97 (L) 10/04/2022    HGB 9.9 (L) 10/04/2022    HCT 32.3 (L) 10/04/2022     10/04/2022    CHOL 193 10/04/2022    TRIG 159 (H) 10/04/2022    HDL 46 10/04/2022    ALT 21 10/04/2022    AST 25 10/04/2022     10/04/2022    K 5.2 (H) 10/04/2022     10/04/2022     CREATININE 2.7 (H) 10/04/2022    BUN 39 (H) 10/04/2022    CO2 23 10/04/2022    TSH 5.231 (H) 06/14/2022    PSA <0.1 05/27/2008    INR 1.0 11/22/2021    HGBA1C 5.2 12/24/2021           Health Maintenance  Health Maintenance Topics with due status: Not Due       Topic Last Completion Date    Pneumococcal Vaccines (Age 65+) 10/22/2018    TETANUS VACCINE 09/09/2020    Colorectal Cancer Screening 02/25/2021    DEXA Scan 08/03/2021    Mammogram 09/19/2022    Lipid Panel 10/04/2022     Health Maintenance Due   Topic Date Due    COVID-19 Vaccine (3 - Moderna risk series) 08/10/2022    Influenza Vaccine (1) 09/01/2022       Assessment:  Problem List Items Addressed This Visit          Renal/    Cyst of right breast     Message sent to surgeon. 6 month mammogram ordered. She is hoping to have biopsy. Pt volunteers that if cancer has returned she would not want any chemotherapy or radiation therapy. She would consider mastectomy or lumpectomy if beneficial.               Plan:  Cyst of right breast    -Patient's lab results were reviewed and discussed with patient  -Treatment options and alternatives were discussed with the patient. Patient expressed understanding. Patient was given the opportunity to ask questions and be an active participant in their medical care. Patient had no further questions or concerns at this time.   -Documentation of patient's health and condition was obtained from family member who was present during visit.  -Patient is an overall moderate risk for health complications from their medical conditions.       Follow up: Follow up in about 4 weeks (around 11/1/2022).      After visit summary printed and given to patient upon discharge.  Patient goals and care plan are included in After visit summary.    Total medical decision making time was 50 min.  The following issues were discussed: The encounter diagnosis was Cyst of right breast.    Health maintenance needs, recent test results and goals of care  discussed with pt and questions answered.

## 2022-10-04 NOTE — Clinical Note
Hoping you will contact Ms Damon for a meeting. Not urgent. She's susie. Limited support, lots of illness and stress. Seems more tearful lately. I'm sure you can help her sort things out. Thanks

## 2022-10-05 ENCOUNTER — TELEPHONE (OUTPATIENT)
Dept: TRANSPLANT | Facility: CLINIC | Age: 67
End: 2022-10-05
Payer: MEDICARE

## 2022-10-05 ENCOUNTER — TELEPHONE (OUTPATIENT)
Dept: PRIMARY CARE CLINIC | Facility: CLINIC | Age: 67
End: 2022-10-05
Payer: MEDICARE

## 2022-10-05 PROBLEM — N60.01 CYST OF RIGHT BREAST: Status: ACTIVE | Noted: 2022-10-05

## 2022-10-05 LAB — CYCLOSPORINE BLD LC/MS/MS-MCNC: 109 NG/ML (ref 100–400)

## 2022-10-05 NOTE — ASSESSMENT & PLAN NOTE
Message sent to surgeon. 6 month mammogram ordered. She is hoping to have biopsy. Pt volunteers that if cancer has returned she would not want any chemotherapy or radiation therapy. She would consider mastectomy or lumpectomy if beneficial.

## 2022-10-05 NOTE — TELEPHONE ENCOUNTER
Spoke with pt this morning after getting her CYA Level back 109 with in goal, no changes in medications at this time. Repeat labs in 3 months- 1/2023.

## 2022-10-05 NOTE — TELEPHONE ENCOUNTER
LCSW spoke to patient on the phone to offer emotional support. New patient visit scheduled with LCSW for 10/13 at 1pm.

## 2022-10-10 ENCOUNTER — LAB VISIT (OUTPATIENT)
Dept: LAB | Facility: HOSPITAL | Age: 67
End: 2022-10-10
Attending: INTERNAL MEDICINE
Payer: MEDICARE

## 2022-10-10 DIAGNOSIS — D05.12 DUCTAL CARCINOMA IN SITU (DCIS) OF LEFT BREAST: Primary | ICD-10-CM

## 2022-10-10 DIAGNOSIS — D05.12 DUCTAL CARCINOMA IN SITU (DCIS) OF LEFT BREAST: ICD-10-CM

## 2022-10-10 DIAGNOSIS — D63.1 ANEMIA ASSOCIATED WITH STAGE 4 CHRONIC RENAL FAILURE: ICD-10-CM

## 2022-10-10 DIAGNOSIS — D50.9 IRON DEFICIENCY ANEMIA, UNSPECIFIED IRON DEFICIENCY ANEMIA TYPE: ICD-10-CM

## 2022-10-10 DIAGNOSIS — N18.4 ANEMIA ASSOCIATED WITH STAGE 4 CHRONIC RENAL FAILURE: ICD-10-CM

## 2022-10-10 LAB
BASOPHILS # BLD AUTO: 0.01 K/UL (ref 0–0.2)
BASOPHILS NFR BLD: 0.3 % (ref 0–1.9)
DIFFERENTIAL METHOD: ABNORMAL
EOSINOPHIL # BLD AUTO: 0.1 K/UL (ref 0–0.5)
EOSINOPHIL NFR BLD: 2.4 % (ref 0–8)
ERYTHROCYTE [DISTWIDTH] IN BLOOD BY AUTOMATED COUNT: 15.9 % (ref 11.5–14.5)
HCT VFR BLD AUTO: 32.7 % (ref 37–48.5)
HGB BLD-MCNC: 10.1 G/DL (ref 12–16)
IMM GRANULOCYTES # BLD AUTO: 0.02 K/UL (ref 0–0.04)
IMM GRANULOCYTES NFR BLD AUTO: 0.6 % (ref 0–0.5)
LYMPHOCYTES # BLD AUTO: 0.7 K/UL (ref 1–4.8)
LYMPHOCYTES NFR BLD: 22.1 % (ref 18–48)
MCH RBC QN AUTO: 27.6 PG (ref 27–31)
MCHC RBC AUTO-ENTMCNC: 30.9 G/DL (ref 32–36)
MCV RBC AUTO: 89 FL (ref 82–98)
MONOCYTES # BLD AUTO: 0.5 K/UL (ref 0.3–1)
MONOCYTES NFR BLD: 15.5 % (ref 4–15)
NEUTROPHILS # BLD AUTO: 2 K/UL (ref 1.8–7.7)
NEUTROPHILS NFR BLD: 59.1 % (ref 38–73)
NRBC BLD-RTO: 0 /100 WBC
PLATELET # BLD AUTO: 203 K/UL (ref 150–450)
PMV BLD AUTO: 9.8 FL (ref 9.2–12.9)
RBC # BLD AUTO: 3.66 M/UL (ref 4–5.4)
WBC # BLD AUTO: 3.3 K/UL (ref 3.9–12.7)

## 2022-10-10 PROCEDURE — 85025 COMPLETE CBC W/AUTO DIFF WBC: CPT

## 2022-10-10 PROCEDURE — 36415 COLL VENOUS BLD VENIPUNCTURE: CPT

## 2022-10-11 NOTE — PRE-PROCEDURE INSTRUCTIONS
Spoke with patient regarding procedure scheduled on 10.19    Arrival time 0900    Has patient been sick with fever or on antibiotics within the last 7 days? No    Does the patient have any open wounds, sores or rashes? No    Does the patient have any recent fractures? no    Has patient received a vaccination within the last 7 days? No    Received the COVID vaccination? yes    Has the patient stopped all medications as directed? NA    Does patient have a pacemaker and or defibrillator? no    Does the patient have a ride to and from procedure and someone reliable to remain with patient? Friend     Is the patient diabetic? no    Does the patient have sleep apnea? Or use O2 at home? No and no     Is the patient receiving sedation? yes    Is the patient instructed to remain NPO beginning at midnight the night before their procedure? yes    Procedure location confirmed with patient? Yes    Covid- Denies signs/symptoms. Instructed to notify PAT/MD if any changes.

## 2022-10-18 ENCOUNTER — TELEPHONE (OUTPATIENT)
Dept: PRIMARY CARE CLINIC | Facility: CLINIC | Age: 67
End: 2022-10-18
Payer: MEDICARE

## 2022-10-18 ENCOUNTER — OUTPATIENT CASE MANAGEMENT (OUTPATIENT)
Dept: ADMINISTRATIVE | Facility: OTHER | Age: 67
End: 2022-10-18
Payer: MEDICARE

## 2022-10-18 DIAGNOSIS — N60.01 CYST OF RIGHT BREAST: Primary | ICD-10-CM

## 2022-10-18 DIAGNOSIS — D05.12 DUCTAL CARCINOMA IN SITU (DCIS) OF LEFT BREAST: ICD-10-CM

## 2022-10-18 NOTE — LETTER
October 19, 2022           Dear Nadia,     Welcome to [facility] Complex Care Management Program.  It was a pleasure talking with you today.  My name is Geri Saha. I look forward to working with you as your .  My goal is to help you function at the healthiest and highest level possible.  You can contact me directly at [phone].    As an [facility] patient with [employer], some of the services we may be able to provide include:      Development of an individualized care plan with a Registered Nurse    Connection with a    Connection with available resources and services     Coordinate communication among your care team members    Provide coaching and education    Help you understand your doctors treatment plan   Help you obtain information about your insurance coverage.     All services provided by [facility]s Complex Care Managers and other care team members are coordinated with and communicated to your primary care team.    As part of your enrollment, you will be receiving education materials and more information about these services in your [facility] account, by phone or through the mail.  If you do not wish to participate or receive information, please contact our office at [phone].      Sincerely,        Geri Saha, RN  [facility]   Out-patient RN Complex Care Manager

## 2022-10-18 NOTE — TELEPHONE ENCOUNTER
----- Message from Elis Wick MD sent at 10/17/2022  5:23 PM CDT -----  Regarding: appt w surgery  Contact: [phone]  I don't know if Luz has heard anything back from the office of Dr. Douglas. She is out most of this week but will be here on Thursday. Maybe contact Dr. Douglas's office to see if can get Ms. Damon an appointment there soon to f/u on breast cysts noted on imaging? Let me know if need a referral order placed.    Thank you      ----- Message -----  From: Irina Canales MA  Sent: 10/17/2022   4:35 PM CDT  To: Elis Wick MD      ----- Message -----  From: Rey Mohan  Sent: 10/17/2022   3:48 PM CDT  To: Kwame SALVADOR Staff    Asking if Nurse Sow had gotten in touch with Dr. Mendoza (surgeon). Mammogram was done and cyst was found. Nurse sow wanted pt to be seen.     Following up, was waiting for a response.

## 2022-10-19 ENCOUNTER — HOSPITAL ENCOUNTER (OUTPATIENT)
Facility: HOSPITAL | Age: 67
Discharge: HOME | End: 2022-10-19
Attending: ANESTHESIOLOGY | Admitting: ANESTHESIOLOGY
Payer: MEDICARE

## 2022-10-19 VITALS
HEIGHT: 62 IN | OXYGEN SATURATION: 99 % | HEART RATE: 85 BPM | TEMPERATURE: 98 F | BODY MASS INDEX: 31.48 KG/M2 | RESPIRATION RATE: 16 BRPM | SYSTOLIC BLOOD PRESSURE: 117 MMHG | WEIGHT: 171.06 LBS | DIASTOLIC BLOOD PRESSURE: 78 MMHG

## 2022-10-19 DIAGNOSIS — M47.816 LUMBAR SPONDYLOSIS: Primary | ICD-10-CM

## 2022-10-19 PROCEDURE — 64494 PR INJ DX/THER AGNT PARAVERT FACET JOINT,IMG GUIDE,LUMBAR/SAC, 2ND LEVEL: ICD-10-PCS | Mod: 50,,, | Performed by: ANESTHESIOLOGY

## 2022-10-19 PROCEDURE — 64493 PR INJ DX/THER AGNT PARAVERT FACET JOINT,IMG GUIDE,LUMBAR/SAC,1ST LVL: ICD-10-PCS | Mod: 50,,, | Performed by: ANESTHESIOLOGY

## 2022-10-19 PROCEDURE — 64493 INJ PARAVERT F JNT L/S 1 LEV: CPT | Mod: 50,,, | Performed by: ANESTHESIOLOGY

## 2022-10-19 PROCEDURE — 25000003 PHARM REV CODE 250: Performed by: ANESTHESIOLOGY

## 2022-10-19 PROCEDURE — 64494 INJ PARAVERT F JNT L/S 2 LEV: CPT | Performed by: ANESTHESIOLOGY

## 2022-10-19 PROCEDURE — 64494 INJ PARAVERT F JNT L/S 2 LEV: CPT | Mod: 50,,, | Performed by: ANESTHESIOLOGY

## 2022-10-19 PROCEDURE — 64493 INJ PARAVERT F JNT L/S 1 LEV: CPT | Performed by: ANESTHESIOLOGY

## 2022-10-19 PROCEDURE — 63600175 PHARM REV CODE 636 W HCPCS: Performed by: ANESTHESIOLOGY

## 2022-10-19 RX ORDER — BUPIVACAINE HYDROCHLORIDE 5 MG/ML
INJECTION, SOLUTION EPIDURAL; INTRACAUDAL
Status: DISCONTINUED | OUTPATIENT
Start: 2022-10-19 | End: 2022-10-19 | Stop reason: HOSPADM

## 2022-10-19 RX ORDER — BACLOFEN 10 MG/1
10 TABLET ORAL DAILY
COMMUNITY
End: 2022-11-11

## 2022-10-19 RX ORDER — MIDAZOLAM HYDROCHLORIDE 1 MG/ML
INJECTION, SOLUTION INTRAMUSCULAR; INTRAVENOUS
Status: DISCONTINUED | OUTPATIENT
Start: 2022-10-19 | End: 2022-10-19 | Stop reason: HOSPADM

## 2022-10-19 RX ORDER — ONDANSETRON 2 MG/ML
4 INJECTION INTRAMUSCULAR; INTRAVENOUS ONCE AS NEEDED
Status: COMPLETED | OUTPATIENT
Start: 2022-10-19 | End: 2022-10-19

## 2022-10-19 RX ORDER — FENTANYL CITRATE 50 UG/ML
INJECTION, SOLUTION INTRAMUSCULAR; INTRAVENOUS
Status: DISCONTINUED | OUTPATIENT
Start: 2022-10-19 | End: 2022-10-19 | Stop reason: HOSPADM

## 2022-10-19 RX ADMIN — ONDANSETRON 4 MG: 2 INJECTION INTRAMUSCULAR; INTRAVENOUS at 10:10

## 2022-10-19 NOTE — PROGRESS NOTES
Outpatient Care Management  Initial Patient Assessment    Patient: Nadia Damon  MRN: [id number]  Date of Service: 10/18/2022  Completed by: Geri Saha RN  Referral Date: 09/02/2022  Program: High Risk  Status: Ongoing  Effective Dates: 10/18/2022 - present  Responsible Staff: Geri Saha RN        Reason for Visit   Patient presents with    OPCM Enrollment Call    Initial Assessment    PHQ-9    Plan Of Care     Medication Reconciliation        Brief Summary:  Nadia Damon was referred by TASNEEM Dickson NP for hyperkalemia. Patient qualifies for program based on risk score of 86%.   Active problem list, medical, surgical and social history reviewed. Active comorbidities include recurring depression with anxiety, hx of heart transplant in 2013, HTN, hyperkalemia, immunosuppressed state, CKD4, anemia, thrombocytopenia hx of L breast ductal cancer in situ with + lymph nodes, fibromyalgia and obesity. Areas of need identified by patient include education about low K+ diet related to recent hyperkalemia. She also requests a [employer] O[employer] booklet with pics as she needs to order a tub bench so wants to be able to look at pics.  Complex care plan created with patient/caregiver input. By next encounter, patient agrees to read educational literature being mailed by this CM.     Assessment Documentation     OPCM Initial Assessment    Involvement of Care  Do I have permission to speak with other family members about your care?: No  Assessment completed by: Patient  Identified Areas of Need  Advanced Care Planning: No  Housing: no  Medication Adherence: No  *Active medication list was reviewed and reconciled with patient and/or caregiver:   Nutrition: yes (Comment: Needs low K+ food list/diet)  Lab Adherence: no  Depression: No  Cognitive/Behavioral Health: no  Communication: no  Health Literacy: no  Fall risk?: No  Equipment/Supplies/Services: yes (Comment: Needs education about low K+ diet.)          Problem List  and History     Problems Addressed This Visit    Depression: Not identified by patient as current problem  Heart Failure: Not identified by patient as current problem  Hypertension: Not identified by patient as current problem  Anemia: Not identified by patient as current problem  Osteoporosis: Not identified by patient as current problem  Chronic Kidney Disease: Not identified by patient as current problem  Hyperlipidemia: Not identified by patient as current problem  Cancer: Not identified by patient as current problem         Reviewed medical and social history with patient and/or caregiver. A complex care plan was discussed and completed today, with input from patient and/or caregiver.    Patient Instructions     Instructions were provided via the [employer] patient resources and are available for the patient to view on the patient portal, if active.    Next steps: Mail educational literature about low K+ diet and educate about foods to avoid. Geri Saha RN    No follow-ups on file.    Todays OPCM Self-Management Care Plan was developed with the patients/caregivers input and was based on identified barriers from todays assessment.  Goals were written today with the patient/caregiver and the patient has agreed to work towards these goals to improve his/her overall well-being. Patient verbalized understanding of the care plan, goals, and all of today's instructions. Encouraged patient/caregiver to communicate with his/her physician and health care team about health conditions and the treatment plan.  Provided my contact information today and encouraged patient/caregiver to call me with any questions as needed.

## 2022-10-19 NOTE — DISCHARGE SUMMARY
Discharge Note  Short Stay      SUMMARY     Admit Date: 10/19/2022    Attending Physician: Jassi Pierre MD        Discharge Physician: Jassi Pierre MD        Discharge Date: 10/19/2022 10:40 AM    Procedure(s) (LRB):  Right L4/L5 and L5/S1 MBB (Right)    Final Diagnosis: Lumbar spondylosis [M47.816]    Disposition: Home or self care    Patient Instructions:   Current Discharge Medication List        CONTINUE these medications which have NOT CHANGED    Details   amLODIPine (NORVASC) 2.5 MG tablet       aspirin (ECOTRIN) 81 MG EC tablet Take 1 tablet (81 mg total) by mouth once daily.  Qty: 90 tablet, Refills: 3      baclofen (LIORESAL) 10 MG tablet Take 10 mg by mouth once daily.      biotin 10,000 mcg Cap Take 1 tablet by mouth once daily.      busPIRone (BUSPAR) 10 MG tablet Take 1 tablet (10 mg total) by mouth once daily.  Qty: 90 tablet, Refills: 3    Associated Diagnoses: Anxiety      calcitonin, salmon, (FORTICAL) 200 unit/actuation nasal spray 1 spray by Nasal route once daily. Alternate nostrils - give with calcium, vitamin D  Qty: 3.7 mL, Refills: 2    Comments: NEW RX REQUEST FOR PATIENT DUE TO USING NEW MAIL ORDER PHARMACY-[facility]      carvediloL (COREG) 6.25 MG tablet Take 1 tablet (6.25 mg total) by mouth 2 (two) times daily with meals.  Qty: 180 tablet, Refills: 3      cycloSPORINE modified, NEORAL, (NEORAL) 25 MG capsule TAKE 3 CAPSULES (75 MG TOTAL) BY MOUTH 2 (TWO) TIMES DAILY.  Qty: 540 capsule, Refills: 6    Associated Diagnoses: Heart transplanted      DULoxetine (CYMBALTA) 30 MG capsule TAKE 1 CAPSULE EVERY DAY  Qty: 90 capsule, Refills: 1    Comments: To start once complete duloxetine prescription sent to local pharmacy today  Associated Diagnoses: Fibromyalgia      furosemide (LASIX) 20 MG tablet Take 1 tablet (20 mg total) by mouth 2 (two) times a day for 5 days, THEN 1 tablet (20 mg total) once daily. Take 1 tablet daily.  Qty: 370 tablet, Refills: 0    Associated  Diagnoses: Heart transplanted; Essential hypertension      hydrALAZINE (APRESOLINE) 50 MG tablet Take 1 tablet (50 mg total) by mouth every 8 (eight) hours. TAKE 1 TABLETS  EVERY 8  HOURS.  Qty: 270 tablet, Refills: 3    Associated Diagnoses: Essential hypertension      multivitamin capsule Take 1 capsule by mouth once daily.      pantoprazole (PROTONIX) 40 MG tablet TAKE 1 TABLET EVERY DAY  Qty: 90 tablet, Refills: 1    Associated Diagnoses: Gastroesophageal reflux disease, unspecified whether esophagitis present      pregabalin (LYRICA) 50 MG capsule TAKE 1 CAPSULE 2 TIMES DAILY AT NOON AND NIGHT TIME  Qty: 180 capsule, Refills: 1    Associated Diagnoses: Fibromyalgia      temazepam (RESTORIL) 30 mg capsule Take 1 at bedtime  Qty: 90 capsule, Refills: 1    Associated Diagnoses: Insomnia, unspecified type      tiZANidine (ZANAFLEX) 4 MG tablet Take 1 tablet (4 mg total) by mouth 2 (two) times daily as needed (Pain).  Qty: 60 tablet, Refills: 1    Associated Diagnoses: Lumbar spondylosis      vitamin E 400 UNIT capsule Take 400 Units by mouth once daily.      zolpidem (AMBIEN) 10 mg Tab TAKE 1 TABLET BY MOUTH ONCE DAILY IN THE EVENING  Qty: 90 tablet, Refills: 1    Associated Diagnoses: Primary insomnia      EVENING PRIMROSE OIL ORAL Take 1,000 mg by mouth once daily.      RETACRIT 20,000 unit/mL injection                  Discharge Diagnosis: Lumbar spondylosis [M47.816]  Condition on Discharge: Stable with no complications to procedure   Diet on Discharge: Same as before.  Activity: as per instruction sheet.  Discharge to: Home with a responsible adult.  Follow up: 2-4 weeks       Please call the office at [phone] if you experience any weakness or loss of sensation, fever > 101.5, pain uncontrolled with oral medications, persistent nausea/vomiting/or diarrhea, redness or drainage from the incisions, or any other worrisome concerns. If physician on call was not reached or could not communicate with our  office for any reason please go to the nearest emergency department

## 2022-10-19 NOTE — DISCHARGE INSTRUCTIONS

## 2022-10-19 NOTE — OP NOTE
LUMBAR Medial Branch Block Under Fluoroscopy, Right L4/L5 and L5/S1     INFORMED CONSENT: The procedure, risks, benefits and options were discussed with patient. There are no contraindications to the procedure. The patient expressed understanding and agreed to proceed. The personnel performing the procedure was discussed.    Date of procedure 10/19/2022    Time-out taken to identify patient and procedure side prior to starting the procedure.                                                                PROCEDURE:  Right medial branch block at the transverse processes at the level of L4, L5, and the sacral ala    Pre Procedure diagnosis:    Lumbar spondylosis [M47.816]  1. Lumbar spondylosis        Post-Procedure diagnosis:   same    PHYSICIAN: Jassi Pierre MD  ASSISTANTS: None    MEDICATIONS INJECTED: 0.5% bupivicane, 1mL at each level    LOCAL ANESTHETIC USED: Lidocaine, 1%, 1ml at each level    ESTIMATED BLOOD LOSS:  None.    COMPLICATIONS:  None.    Sedation: Conscious sedation provided by M.BRITTANI    SEDATION MEDICATIONS: local/IV sedation: Versed 2 mg and fentanyl 50 mcg IV.  Conscious sedation ordered by MD.  Patient reevaluated and sedation administered by MD and monitored by RN.  Total sedation time was less than 10 min.    Total sedation time was <10 min      TECHNIQUE: Laying in a prone position, the patient was prepped and draped in the usual sterile fashion using ChloraPrep and fenestrated drape.  The level was determined under fluoroscopic guidance.  Local anesthetic was given by going down to the hub of the 27-gauge 1.25in needle and raising a wheel.  A 25-gauge 3.5inch needle was introduced to the anatomic local of the medial branch at each of the above levels using fluoroscopy in the AP, oblique, and lateral views.  After negative aspiration, medication was injected slowly. The patient tolerated the procedure well.       The patient was monitored after the procedure.  Patient was given post  procedure and discharge instructions to follow at home.  We will see the patient back in two weeks or the patient may call to inform of status. The patient was discharged in a stable condition

## 2022-10-20 ENCOUNTER — TELEPHONE (OUTPATIENT)
Dept: PAIN MEDICINE | Facility: CLINIC | Age: 67
End: 2022-10-20
Payer: MEDICARE

## 2022-10-20 DIAGNOSIS — M47.816 LUMBAR SPONDYLOSIS: Primary | ICD-10-CM

## 2022-10-20 NOTE — TELEPHONE ENCOUNTER
Patient reports 90% relief and lower back pain after right L4-5 and L5-S1 medial branch block on 10/19/2022.  Will schedule patient for 2nd diagnostic block.  She received some relief we will proceed with radiofrequency ablation.

## 2022-10-24 ENCOUNTER — PATIENT MESSAGE (OUTPATIENT)
Dept: PRIMARY CARE CLINIC | Facility: CLINIC | Age: 67
End: 2022-10-24
Payer: MEDICARE

## 2022-10-24 ENCOUNTER — OFFICE VISIT (OUTPATIENT)
Dept: SURGERY | Facility: CLINIC | Age: 67
End: 2022-10-24
Payer: MEDICARE

## 2022-10-24 VITALS
DIASTOLIC BLOOD PRESSURE: 76 MMHG | BODY MASS INDEX: 31.53 KG/M2 | HEART RATE: 92 BPM | SYSTOLIC BLOOD PRESSURE: 118 MMHG | WEIGHT: 172.38 LBS | TEMPERATURE: 98 F

## 2022-10-24 DIAGNOSIS — N60.01 CYST OF RIGHT BREAST: ICD-10-CM

## 2022-10-24 DIAGNOSIS — R92.8 ABNORMAL MAMMOGRAM OF RIGHT BREAST: Primary | ICD-10-CM

## 2022-10-24 DIAGNOSIS — D05.12 DUCTAL CARCINOMA IN SITU (DCIS) OF LEFT BREAST: ICD-10-CM

## 2022-10-24 PROCEDURE — 1126F PR PAIN SEVERITY QUANTIFIED, NO PAIN PRESENT: ICD-10-PCS | Mod: CPTII,S$GLB,, | Performed by: SURGERY

## 2022-10-24 PROCEDURE — 4010F ACE/ARB THERAPY RXD/TAKEN: CPT | Mod: CPTII,S$GLB,, | Performed by: SURGERY

## 2022-10-24 PROCEDURE — 3074F SYST BP LT 130 MM HG: CPT | Mod: CPTII,S$GLB,, | Performed by: SURGERY

## 2022-10-24 PROCEDURE — 1157F PR ADVANCE CARE PLAN OR EQUIV PRESENT IN MEDICAL RECORD: ICD-10-PCS | Mod: CPTII,S$GLB,, | Performed by: SURGERY

## 2022-10-24 PROCEDURE — 1126F AMNT PAIN NOTED NONE PRSNT: CPT | Mod: CPTII,S$GLB,, | Performed by: SURGERY

## 2022-10-24 PROCEDURE — 3066F NEPHROPATHY DOC TX: CPT | Mod: CPTII,S$GLB,, | Performed by: SURGERY

## 2022-10-24 PROCEDURE — 99214 OFFICE O/P EST MOD 30 MIN: CPT | Mod: S$GLB,,, | Performed by: SURGERY

## 2022-10-24 PROCEDURE — 1159F MED LIST DOCD IN RCRD: CPT | Mod: CPTII,S$GLB,, | Performed by: SURGERY

## 2022-10-24 PROCEDURE — 3078F DIAST BP <80 MM HG: CPT | Mod: CPTII,S$GLB,, | Performed by: SURGERY

## 2022-10-24 PROCEDURE — 3078F PR MOST RECENT DIASTOLIC BLOOD PRESSURE < 80 MM HG: ICD-10-PCS | Mod: CPTII,S$GLB,, | Performed by: SURGERY

## 2022-10-24 PROCEDURE — 99999 PR PBB SHADOW E&M-EST. PATIENT-LVL IV: ICD-10-PCS | Mod: PBBFAC,,, | Performed by: SURGERY

## 2022-10-24 PROCEDURE — 3066F PR DOCUMENTATION OF TREATMENT FOR NEPHROPATHY: ICD-10-PCS | Mod: CPTII,S$GLB,, | Performed by: SURGERY

## 2022-10-24 PROCEDURE — 99214 PR OFFICE/OUTPT VISIT, EST, LEVL IV, 30-39 MIN: ICD-10-PCS | Mod: S$GLB,,, | Performed by: SURGERY

## 2022-10-24 PROCEDURE — 99999 PR PBB SHADOW E&M-EST. PATIENT-LVL IV: CPT | Mod: PBBFAC,,, | Performed by: SURGERY

## 2022-10-24 PROCEDURE — 4010F PR ACE/ARB THEARPY RXD/TAKEN: ICD-10-PCS | Mod: CPTII,S$GLB,, | Performed by: SURGERY

## 2022-10-24 PROCEDURE — 1160F RVW MEDS BY RX/DR IN RCRD: CPT | Mod: CPTII,S$GLB,, | Performed by: SURGERY

## 2022-10-24 PROCEDURE — 1159F PR MEDICATION LIST DOCUMENTED IN MEDICAL RECORD: ICD-10-PCS | Mod: CPTII,S$GLB,, | Performed by: SURGERY

## 2022-10-24 PROCEDURE — 3074F PR MOST RECENT SYSTOLIC BLOOD PRESSURE < 130 MM HG: ICD-10-PCS | Mod: CPTII,S$GLB,, | Performed by: SURGERY

## 2022-10-24 PROCEDURE — 1160F PR REVIEW ALL MEDS BY PRESCRIBER/CLIN PHARMACIST DOCUMENTED: ICD-10-PCS | Mod: CPTII,S$GLB,, | Performed by: SURGERY

## 2022-10-24 PROCEDURE — 1157F ADVNC CARE PLAN IN RCRD: CPT | Mod: CPTII,S$GLB,, | Performed by: SURGERY

## 2022-10-27 ENCOUNTER — OUTPATIENT CASE MANAGEMENT (OUTPATIENT)
Dept: ADMINISTRATIVE | Facility: OTHER | Age: 67
End: 2022-10-27
Payer: MEDICARE

## 2022-10-27 NOTE — LETTER
October 27, 2022    Nadia Damon  [address]  [address]  [place] [address]             [facility] [address] [place] [address] Dear Mrs Zuniga,      I work with [facility] Outpatient Case Management Department. I have been unsuccessful at reaching you recently since we spoke on 10/19/22. If you require any future assistance or if any new concerns or problems arise, please call me.     The Outpatient Case Management Department can be reached at [phone] from 8 AM to 4:30 PM Monday thru Friday.    [facility] also has a program called [facility]) with a nurse available 24/7 to answer questions or provide medical advice for any non-emergent symptoms you may have. That number is [phone].     Kindest Regards,        Geri Saha RN  Outpatient Case Management  [phone]

## 2022-10-30 NOTE — PROGRESS NOTES
General Surgery Clinic  Follow-Up    Patient Name: Nadia Damon  YOB: 1955 (67 y.o.)  MRN: [id number]  Today's Date: 10/30/2022    Referring Md:   Elis Wick Md  [id number] [place] [id number]    SUBJECTIVE:     Chief Complaint: F/u    History of Present Illness:  Nadia Damon is a 67 y.o. female with PMHx of heart transplant s/p left breast lumpectomy 9/10/21 and SLNbx 10/12/21 presents for abnormal mammogram of the right breast.She denies any breast changes in the interim.         Review of patient's allergies indicates:   Allergen Reactions    Lisinopril Swelling and Rash    Augmentin [amoxicillin-pot clavulanate] Diarrhea       Past Medical History:   Diagnosis Date    Abdominal wall hernia     CT Renal 6/11/2018---Small fat containing superior ventral abdominal wall hernia at the epicardial pacing lead site.    Abnormal mammogram 10/12/2021    Anxiety     Arthritis     ZEN HIPS    Breast cancer in female 08/2021    LEFT BREAST    C. difficile colitis 11/29/2021    Cellulitis of axilla, left 12/23/2021    Chronic diastolic heart failure 12/16/2021    Chronic kidney disease     stage 4, GFR 15-29 ml/min    Chronic midline low back pain without sciatica 06/18/2018    Closed nondisplaced fracture of distal phalanx of left great toe with routine healing 10/22/2018    Coronary artery disease 1993    heart transplant    Cystitis 5/10/2022    Depression     Fibromyalgia     on Lyrica    Heart failure     native heart cardiomyopathy    Heart transplanted 1993    due to cardiomyopathy    History of hyperparathyroidism; Hyperparathyroidism, secondary renal     PT DENIES    Hypertension     Immune disorder     anti rejection meds    Iron deficiency anemia 08/15/2017    Kidney stones     passed per pt    Obesity     Other osteoporosis without current pathological fracture 08/30/2019    Severe sepsis 11/22/2021    Shingles 2003 approx    left leg    Trouble in sleeping     Urinary  incontinence      Past Surgical History:   Procedure Laterality Date    BLADDER SURGERY  2015 approx    mesh - Dr Everett then 2nd reconstructive sx Dr Onofre    BREAST BIOPSY Bilateral     NEGATIVE    BREAST LUMPECTOMY Left 2021    BREAST SURGERY Left 09/28/2015    Bx - benign    BREAST SURGERY Right 12/2015    Bx benign    CARDIAC PACEMAKER REMOVAL Left 06/26/2014    Pacer defirillator removed. Put in 1993 aat time of heart transplant    CARPAL TUNNEL RELEASE Left 03/03/2015    Dr. Hall    COLONOSCOPY N/A 02/25/2021    Procedure: COLONOSCOPY;  Surgeon: Freida Ramirez MD;  Location: [facility] ENDO;  Service: Endoscopy;  Laterality: N/A;    HEART TRANSPLANT  1993    HERNIA REPAIR Right 1971 approx    Inguinal    HYSTERECTOMY  1983    vag hyst /LSO     INCISION AND DRAINAGE OF ABSCESS Left 12/24/2021    Procedure: INCISION AND DRAINAGE, ABSCESS;  Surgeon: Joseph Longo MD;  Location: [facility] OR;  Service: General;  Laterality: Left;    INJECTION OF ANESTHETIC AGENT AROUND MEDIAL BRANCH NERVES INNERVATING LUMBAR FACET JOINT Right 10/19/2022    Procedure: Right L4/L5 and L5/S1 MBB;  Surgeon: Jassi Pierre MD;  Location: [facility] PAIN MGT;  Service: Pain Management;  Laterality: Right;    INJECTION OF ANESTHETIC AGENT INTO SACROILIAC JOINT Right 08/22/2022    Procedure: Right SIJ Injection Right L5/S1 Facte Injection;  Surgeon: Jassi Pierre MD;  Location: [facility] PAIN MGT;  Service: Pain Management;  Laterality: Right;    INSERTION OF TUNNELED CENTRAL VENOUS CATHETER (CVC) WITH SUBCUTANEOUS PORT N/A 11/09/2021    Procedure: [id number];  Surgeon: Christoph Douglas MD;  Location: [facility] OR;  Service: General;  Laterality: N/A;    REMOVAL OF VASCULAR ACCESS PORT      SENTINEL LYMPH NODE BIOPSY Left 10/12/2021    Procedure: BIOPSY, LYMPH NODE, SENTINEL;  Surgeon: Christoph Douglas MD;  Location: [facility] OR;  Service: General;  Laterality: Left;    TOE SURGERY       Family History   Problem Relation Age of Onset    Cancer  Mother 38        breast    Breast cancer Mother     Breast cancer Maternal Grandmother     Heart disease Maternal Grandmother     Hypertension Son     Cataracts Cousin     Diabetes Neg Hx     Stroke Neg Hx     Kidney disease Neg Hx     Asthma Neg Hx     COPD Neg Hx     Melanoma Neg Hx     Hyperlipidemia Neg Hx      Social History     Tobacco Use    Smoking status: Never    Smokeless tobacco: Never   Substance Use Topics    Alcohol use: Never     Alcohol/week: 0.0 standard drinks    Drug use: No        Review of Systems:  Review of Systems   Constitutional:  Negative for chills and fever.   HENT:  Negative for congestion and sore throat.    Respiratory:  Negative for cough and shortness of breath.    Cardiovascular:  Negative for chest pain and leg swelling.   Gastrointestinal:  Negative for abdominal pain, nausea and vomiting.   Genitourinary:  Negative for dysuria.   Musculoskeletal:  Negative for myalgias.   Skin:  Negative for rash.   Neurological:  Negative for weakness and headaches.     OBJECTIVE:     Vital Signs (Most Recent)  /76   Pulse 92   Temp 98.1 °F (36.7 °C) (Oral)   Wt 78.2 kg (172 lb 6.4 oz)   LMP 06/20/1983 (Within Years)   BMI 31.53 kg/m²     Physical Exam  Constitutional:       Appearance: She is well-developed.   HENT:      Head: Normocephalic and atraumatic.      Right Ear: External ear normal.      Left Ear: External ear normal.      Mouth/Throat:      Pharynx: No oropharyngeal exudate.   Eyes:      General: No scleral icterus.        Right eye: No discharge.         Left eye: No discharge.      Conjunctiva/sclera: Conjunctivae normal.      Pupils: Pupils are equal, round, and reactive to light.   Neck:      Thyroid: No thyromegaly.   Cardiovascular:      Rate and Rhythm: Normal rate.   Pulmonary:      Effort: Pulmonary effort is normal.   Chest:   Breasts:     Right: No inverted nipple, mass, nipple discharge, skin change or tenderness.      Left: No inverted nipple, mass, nipple  discharge, skin change or tenderness.       Abdominal:      Palpations: Abdomen is soft.   Musculoskeletal:         General: Normal range of motion.      Right shoulder: No crepitus. Normal strength.      Cervical back: Normal range of motion and neck supple.   Lymphadenopathy:      Head:      Right side of head: No submental, submandibular, tonsillar, preauricular, posterior auricular or occipital adenopathy.      Left side of head: No submental, submandibular, tonsillar, preauricular, posterior auricular or occipital adenopathy.      Cervical: No cervical adenopathy.      Right cervical: No superficial or posterior cervical adenopathy.     Left cervical: No superficial or posterior cervical adenopathy.      Upper Body:      Right upper body: No supraclavicular adenopathy.      Left upper body: No supraclavicular adenopathy.   Skin:     General: Skin is warm and dry.      Coloration: Skin is not pale.      Findings: No erythema or rash.   Neurological:      Mental Status: She is alert and oriented to person, place, and time.      Deep Tendon Reflexes: Reflexes are normal and symmetric.   Psychiatric:         Behavior: Behavior normal.         Thought Content: Thought content normal.         Judgment: Judgment normal.     Final Pathologic Diagnosis 1.  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), cribriform and solid patterns with central   necrosis   The DCIS measures approximately 4 cm in size, present in 10/37 blocks   The microinvasive carcinoma measures 2 mm to the closest posterior surgical   margin, 18 mm to the closest superior surgical margin, 21 mm to the closest   anterior surgical margin, and at least 10 mm from the remainder of the   surgical margins   DCIS measures 2 mm to the closest posterior surgical margin, 12 mm to closest   superior surgical margin, 14 mm to the closest anterior surgical margin, and   at  "least 10 mm from the remainder of the surgical margins   Please see complete surgical pathology cancer case summary below   PATHOLOGIC TNM STAGING: (m)pTmi   Selected slides have been reviewed by Dr. Yana Paulino MD, who concurs   with the diagnosis.   2.  Superior, deep, and lateral margins, excision:   Fibroadipose tissue   Negative for malignancy   Surgical Pathology Cancer Case Summary   Procedure - Excision (less than total mastectomy)   Specimen laterality - left   Histologic type - micro-invasive carcinoma   Histologic grade - Not applicable (microinvasive only)   Tumor size - Microinvasion only (less than or equal to 1 mm)   Tumor focality - multifocal   Ductal carcinoma in situ - present   Positive for extensive intraductal component (EIC)   Size (extent) of DCIS - approximately 4 cm   Number of blocks with DCIS: 10   Number of blocks examined: 43   Architectural patterns - solid, cribriform   Nuclear grade - Grade III (high)   Necrosis: Present, central (expansive "comedo" necrosis)   Microcalcifications - present in DCIS and non-neoplastic tissue   Treatment effect in the breast - No known presurgical therapy   Margins   Margin status for invasive carcinoma   All margins negative for invasive carcinoma   The microinvasive carcinoma measures 2 mm to the closest posterior surgical   margin, 18 mm to the closest superior surgical margin, 21 mm to the closest   anterior surgical margin, and at least 10 mm from the remainder of the   surgical margins   Margin status for DCIS   All margins negative for DCIS   DCIS measures 2 mm to the closest posterior surgical margin, 12 mm to closest   superior surgical margin, 14 mm to the closest anterior surgical margin, and   at least 10 mm from the remainder of the surgical margins   Regional lymph nodes   Not applicable (no regional lymph nodes submitted or found)   Pathologic Stage Classification   TNM descriptors   m (multiple foci of invasive carcinoma), " approximately 4 foci   pT-pTmi: Tumor less than or equal to 1 mm   pN-not assigned (no nodes submitted or found)   Additional findings - Fibrocystic changes         Final Pathologic Diagnosis 1. [place] lymph node #1, biopsy:       -  One lymph node, POSITIVE for metastatic carcinoma (1/1)       -  Largest metastatic deposit:  1.5 mm       -  Extracapsular extension:  Not identified       -  Immunohistochemical stain for CAM 5.2, AE1/AE3 and wide spectrum of   keratin are positive in          tumor cells   2. [place] lymph node #2, biopsy:       -  One lymph node, negative for metastatic carcinoma (0/1)       -  Immunohistochemical stain for CAM 5.2, AE1/AE3 and wide spectrum of   keratin are negative     Result:   Mammo Digital Diagnostic Left with Iván  US Breast Left Limited     History:  Patient is 66 y.o. and is seen for diagnostic imaging.     Films Compared:  Prior images (if available) were compared.     Findings:  This procedure was performed using tomosynthesis. Computer-aided detection was utilized in the interpretation of this examination.  Left  Mammo Digital Diagnostic Left with Iván  The left breast has scattered areas of fibroglandular density.   Postlumpectomy and radiation changes noted within the upper outer quadrant with the area demonstrating decreasing density. Ultrasound performed demonstrates ill-defined complex fluid, difficult to accurately measure but decreased in overall volume. Mammographic/sonographic findings suggest expected resolving/evolving postoperative changes. Six-month follow-up recommended.         Impression:  As above     BI-RADS Category:   Overall: 3 - Probably Benign     Recommendation:  Short interval follow-up is recommended in 6 Months.  ASSESSMENT/PLAN:     Nadia Damon is a 66 y.o. female status post left breast lumpectomy/sentinel lymph node biopsy now with abnormal mammogram of the right breast    Discussed option for short term interval follow up vs u/s  guided biopsy/aspiration  With her history of breast cancer she would like to proceed with biopsy   U/S guided core biopsy  Call with results  30 minutes of total time spent on the encounter, which includes face to face time and non-face to face time preparing to see the patient (eg, review of tests), Obtaining and/or reviewing separately obtained history, Documenting clinical information in the electronic or other health record, Independently interpreting results (not separately reported) and communicating results to the patient/family/caregiver, or Care coordination (not separately reported).

## 2022-10-31 ENCOUNTER — HOSPITAL ENCOUNTER (OUTPATIENT)
Dept: RADIOLOGY | Facility: HOSPITAL | Age: 67
Discharge: HOME | End: 2022-10-31
Attending: SURGERY
Payer: MEDICARE

## 2022-10-31 DIAGNOSIS — R92.8 ABNORMAL MAMMOGRAM OF RIGHT BREAST: ICD-10-CM

## 2022-10-31 PROCEDURE — 19083 US BREAST BIOPSY WITH IMAGING 1ST SITE RIGHT: ICD-10-PCS | Mod: RT,,, | Performed by: RADIOLOGY

## 2022-10-31 PROCEDURE — 77065 MAMMO DIGITAL DIAGNOSTIC RIGHT: ICD-10-PCS | Mod: 26,RT,, | Performed by: RADIOLOGY

## 2022-10-31 PROCEDURE — 77065 DX MAMMO INCL CAD UNI: CPT | Mod: TC,RT

## 2022-10-31 PROCEDURE — 88305 TISSUE EXAM BY PATHOLOGIST: ICD-10-PCS | Mod: 26,,, | Performed by: PATHOLOGY

## 2022-10-31 PROCEDURE — 88305 TISSUE EXAM BY PATHOLOGIST: CPT | Mod: 26,,, | Performed by: PATHOLOGY

## 2022-10-31 PROCEDURE — 88305 TISSUE EXAM BY PATHOLOGIST: CPT | Performed by: PATHOLOGY

## 2022-10-31 PROCEDURE — 77065 DX MAMMO INCL CAD UNI: CPT | Mod: 26,RT,, | Performed by: RADIOLOGY

## 2022-10-31 PROCEDURE — 19083 BX BREAST 1ST LESION US IMAG: CPT | Mod: RT

## 2022-10-31 PROCEDURE — 19083 BX BREAST 1ST LESION US IMAG: CPT | Mod: RT,,, | Performed by: RADIOLOGY

## 2022-10-31 NOTE — NURSING
Pressure held on right breast biopsy site for 10 mins, hemostasis was achieved, steri strips were applied, and wound was covered with 4x4 guaze and a tegaderm.  Dressing clean, dry and intact with no drainage noted.  Discharge instructions given verbally and in writing, patient voiced understandings.  Patient discharged and accompanied by family member.
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
## 2022-11-01 ENCOUNTER — TELEPHONE (OUTPATIENT)
Dept: PRIMARY CARE CLINIC | Facility: CLINIC | Age: 67
End: 2022-11-01
Payer: MEDICARE

## 2022-11-01 NOTE — TELEPHONE ENCOUNTER
----- Message from Madie Segura sent at 11/1/2022  3:18 PM CDT -----  Contact: pt [phone]  Patient is out of medication and would like RX sent to local pharmacy to use until mail order is delivered     Requesting an RX refill or new RX.  Is this a refill or new RX: refill  RX name and strength (copy/paste from chart):  calcitonin, salmon, calcitonin, salmon, (FORTICAL) 200 unit/actuation nasal spray) 200 unit/actuation nasal spray  Is this a 30 day or 90 day RX:   Pharmacy name and phone # (copy/paste from chart):      [id number] [address] & [address] [address]  Phone: [phone] Fax: [phone]      The doctors have asked that we provide their patients with the following 2 reminders -- prescription refills can take up to 72 hours, and a friendly reminder that in the future you can use your [facility] account to request refills: yes

## 2022-11-01 NOTE — PRE-PROCEDURE INSTRUCTIONS
Spoke with patient regarding procedure scheduled on 11.9    Arrival time 0730    Has patient been sick with fever or on antibiotics within the last 7 days? No    Does the patient have any open wounds, sores or rashes? No    Does the patient have any recent fractures? no    Has patient received a vaccination within the last 7 days? No    Received the COVID vaccination? yes    Has the patient stopped all medications as directed? NA    Does patient have a pacemaker and or defibrillator? no    Does the patient have a ride to and from procedure and someone reliable to remain with patient? agustin    Is the patient diabetic? no    Does the patient have sleep apnea? Or use O2 at home? No and no     Is the patient receiving sedation? yes    Is the patient instructed to remain NPO beginning at midnight the night before their procedure? yes    Procedure location confirmed with patient? Yes    Covid- Denies signs/symptoms. Instructed to notify PAT/MD if any changes.

## 2022-11-01 NOTE — TELEPHONE ENCOUNTER
Patient call returned. She stated that she was out of her nasal spray Fortical 200 units/actuation nasal spray. Patient was informed that the med request will be sent to the provider, she verbally understood the information given.

## 2022-11-02 ENCOUNTER — TELEPHONE (OUTPATIENT)
Dept: SURGERY | Facility: CLINIC | Age: 67
End: 2022-11-02
Payer: MEDICARE

## 2022-11-02 LAB
COMMENT: NORMAL
FINAL PATHOLOGIC DIAGNOSIS: NORMAL
GROSS: NORMAL
Lab: NORMAL

## 2022-11-02 RX ORDER — CALCITONIN SALMON 200 [IU]/.09ML
1 SPRAY, METERED NASAL DAILY
Qty: 3.7 ML | Refills: 2 | OUTPATIENT
Start: 2022-11-02 | End: 2023-01-31

## 2022-11-02 NOTE — TELEPHONE ENCOUNTER
----- Message from Kashmir Carbajal MD sent at 11/2/2022  3:08 PM CDT -----  Benign and concordant.      Thank you.

## 2022-11-03 ENCOUNTER — LAB VISIT (OUTPATIENT)
Dept: LAB | Facility: HOSPITAL | Age: 67
End: 2022-11-03
Payer: MEDICARE

## 2022-11-03 DIAGNOSIS — D05.12 DUCTAL CARCINOMA IN SITU (DCIS) OF LEFT BREAST: ICD-10-CM

## 2022-11-03 DIAGNOSIS — D63.1 ANEMIA ASSOCIATED WITH STAGE 4 CHRONIC RENAL FAILURE: ICD-10-CM

## 2022-11-03 DIAGNOSIS — D50.9 IRON DEFICIENCY ANEMIA, UNSPECIFIED IRON DEFICIENCY ANEMIA TYPE: ICD-10-CM

## 2022-11-03 DIAGNOSIS — N18.4 ANEMIA ASSOCIATED WITH STAGE 4 CHRONIC RENAL FAILURE: ICD-10-CM

## 2022-11-03 LAB
ALBUMIN SERPL BCP-MCNC: 3.4 G/DL (ref 3.5–5.2)
ALP SERPL-CCNC: 130 U/L (ref 55–135)
ALT SERPL W/O P-5'-P-CCNC: 17 U/L (ref 10–44)
ANION GAP SERPL CALC-SCNC: 12 MMOL/L (ref 8–16)
AST SERPL-CCNC: 27 U/L (ref 10–40)
BASOPHILS # BLD AUTO: 0.01 K/UL (ref 0–0.2)
BASOPHILS NFR BLD: 0.3 % (ref 0–1.9)
BILIRUB SERPL-MCNC: 0.5 MG/DL (ref 0.1–1)
BUN SERPL-MCNC: 39 MG/DL (ref 8–23)
CALCIUM SERPL-MCNC: 9.1 MG/DL (ref 8.7–10.5)
CHLORIDE SERPL-SCNC: 106 MMOL/L (ref 95–110)
CO2 SERPL-SCNC: 24 MMOL/L (ref 23–29)
CREAT SERPL-MCNC: 2.7 MG/DL (ref 0.5–1.4)
DIFFERENTIAL METHOD: ABNORMAL
EOSINOPHIL # BLD AUTO: 0.1 K/UL (ref 0–0.5)
EOSINOPHIL NFR BLD: 4.1 % (ref 0–8)
ERYTHROCYTE [DISTWIDTH] IN BLOOD BY AUTOMATED COUNT: 15.6 % (ref 11.5–14.5)
EST. GFR  (NO RACE VARIABLE): 19 ML/MIN/1.73 M^2
FERRITIN SERPL-MCNC: 163 NG/ML (ref 20–300)
GLUCOSE SERPL-MCNC: 117 MG/DL (ref 70–110)
HCT VFR BLD AUTO: 31.3 % (ref 37–48.5)
HGB BLD-MCNC: 10 G/DL (ref 12–16)
IMM GRANULOCYTES # BLD AUTO: 0.02 K/UL (ref 0–0.04)
IMM GRANULOCYTES NFR BLD AUTO: 0.6 % (ref 0–0.5)
IRON SERPL-MCNC: 64 UG/DL (ref 30–160)
LYMPHOCYTES # BLD AUTO: 0.6 K/UL (ref 1–4.8)
LYMPHOCYTES NFR BLD: 16.7 % (ref 18–48)
MCH RBC QN AUTO: 28.2 PG (ref 27–31)
MCHC RBC AUTO-ENTMCNC: 31.9 G/DL (ref 32–36)
MCV RBC AUTO: 88 FL (ref 82–98)
MONOCYTES # BLD AUTO: 0.5 K/UL (ref 0.3–1)
MONOCYTES NFR BLD: 14.6 % (ref 4–15)
NEUTROPHILS # BLD AUTO: 2.2 K/UL (ref 1.8–7.7)
NEUTROPHILS NFR BLD: 63.7 % (ref 38–73)
NRBC BLD-RTO: 0 /100 WBC
PLATELET # BLD AUTO: 204 K/UL (ref 150–450)
PMV BLD AUTO: 9 FL (ref 9.2–12.9)
POTASSIUM SERPL-SCNC: 5.2 MMOL/L (ref 3.5–5.1)
PROT SERPL-MCNC: 7.5 G/DL (ref 6–8.4)
RBC # BLD AUTO: 3.54 M/UL (ref 4–5.4)
SATURATED IRON: 23 % (ref 20–50)
SODIUM SERPL-SCNC: 142 MMOL/L (ref 136–145)
TOTAL IRON BINDING CAPACITY: 278 UG/DL (ref 250–450)
TRANSFERRIN SERPL-MCNC: 188 MG/DL (ref 200–375)
WBC # BLD AUTO: 3.42 K/UL (ref 3.9–12.7)

## 2022-11-03 PROCEDURE — 36415 COLL VENOUS BLD VENIPUNCTURE: CPT

## 2022-11-03 PROCEDURE — 85025 COMPLETE CBC W/AUTO DIFF WBC: CPT

## 2022-11-03 PROCEDURE — 82728 ASSAY OF FERRITIN: CPT

## 2022-11-03 PROCEDURE — 84466 ASSAY OF TRANSFERRIN: CPT

## 2022-11-03 PROCEDURE — 80053 COMPREHEN METABOLIC PANEL: CPT

## 2022-11-07 ENCOUNTER — OFFICE VISIT (OUTPATIENT)
Dept: HEMATOLOGY/ONCOLOGY | Facility: CLINIC | Age: 67
End: 2022-11-07
Payer: MEDICARE

## 2022-11-07 ENCOUNTER — PATIENT MESSAGE (OUTPATIENT)
Dept: SURGERY | Facility: HOSPITAL | Age: 67
End: 2022-11-07
Payer: MEDICARE

## 2022-11-07 VITALS
TEMPERATURE: 98 F | OXYGEN SATURATION: 98 % | HEART RATE: 92 BPM | HEIGHT: 62 IN | BODY MASS INDEX: 31.56 KG/M2 | SYSTOLIC BLOOD PRESSURE: 124 MMHG | WEIGHT: 171.5 LBS | DIASTOLIC BLOOD PRESSURE: 80 MMHG

## 2022-11-07 DIAGNOSIS — N18.4 ANEMIA ASSOCIATED WITH STAGE 4 CHRONIC RENAL FAILURE: ICD-10-CM

## 2022-11-07 DIAGNOSIS — D05.12 DUCTAL CARCINOMA IN SITU (DCIS) OF LEFT BREAST: Primary | ICD-10-CM

## 2022-11-07 DIAGNOSIS — D72.819 LEUKOPENIA, UNSPECIFIED TYPE: ICD-10-CM

## 2022-11-07 DIAGNOSIS — D63.1 ANEMIA ASSOCIATED WITH STAGE 4 CHRONIC RENAL FAILURE: ICD-10-CM

## 2022-11-07 PROCEDURE — 3008F BODY MASS INDEX DOCD: CPT | Mod: CPTII,S$GLB,, | Performed by: NURSE PRACTITIONER

## 2022-11-07 PROCEDURE — 4010F PR ACE/ARB THEARPY RXD/TAKEN: ICD-10-PCS | Mod: CPTII,S$GLB,, | Performed by: NURSE PRACTITIONER

## 2022-11-07 PROCEDURE — 3079F PR MOST RECENT DIASTOLIC BLOOD PRESSURE 80-89 MM HG: ICD-10-PCS | Mod: CPTII,S$GLB,, | Performed by: NURSE PRACTITIONER

## 2022-11-07 PROCEDURE — 99214 PR OFFICE/OUTPT VISIT, EST, LEVL IV, 30-39 MIN: ICD-10-PCS | Mod: S$GLB,,, | Performed by: NURSE PRACTITIONER

## 2022-11-07 PROCEDURE — 4010F ACE/ARB THERAPY RXD/TAKEN: CPT | Mod: CPTII,S$GLB,, | Performed by: NURSE PRACTITIONER

## 2022-11-07 PROCEDURE — 99214 OFFICE O/P EST MOD 30 MIN: CPT | Mod: S$GLB,,, | Performed by: NURSE PRACTITIONER

## 2022-11-07 PROCEDURE — 99999 PR PBB SHADOW E&M-EST. PATIENT-LVL V: ICD-10-PCS | Mod: PBBFAC,,, | Performed by: NURSE PRACTITIONER

## 2022-11-07 PROCEDURE — 1101F PR PT FALLS ASSESS DOC 0-1 FALLS W/OUT INJ PAST YR: ICD-10-PCS | Mod: CPTII,S$GLB,, | Performed by: NURSE PRACTITIONER

## 2022-11-07 PROCEDURE — 3074F SYST BP LT 130 MM HG: CPT | Mod: CPTII,S$GLB,, | Performed by: NURSE PRACTITIONER

## 2022-11-07 PROCEDURE — 1157F ADVNC CARE PLAN IN RCRD: CPT | Mod: CPTII,S$GLB,, | Performed by: NURSE PRACTITIONER

## 2022-11-07 PROCEDURE — 1125F PR PAIN SEVERITY QUANTIFIED, PAIN PRESENT: ICD-10-PCS | Mod: CPTII,S$GLB,, | Performed by: NURSE PRACTITIONER

## 2022-11-07 PROCEDURE — 3288F FALL RISK ASSESSMENT DOCD: CPT | Mod: CPTII,S$GLB,, | Performed by: NURSE PRACTITIONER

## 2022-11-07 PROCEDURE — 1157F PR ADVANCE CARE PLAN OR EQUIV PRESENT IN MEDICAL RECORD: ICD-10-PCS | Mod: CPTII,S$GLB,, | Performed by: NURSE PRACTITIONER

## 2022-11-07 PROCEDURE — 1160F RVW MEDS BY RX/DR IN RCRD: CPT | Mod: CPTII,S$GLB,, | Performed by: NURSE PRACTITIONER

## 2022-11-07 PROCEDURE — 1159F MED LIST DOCD IN RCRD: CPT | Mod: CPTII,S$GLB,, | Performed by: NURSE PRACTITIONER

## 2022-11-07 PROCEDURE — 1101F PT FALLS ASSESS-DOCD LE1/YR: CPT | Mod: CPTII,S$GLB,, | Performed by: NURSE PRACTITIONER

## 2022-11-07 PROCEDURE — 3288F PR FALLS RISK ASSESSMENT DOCUMENTED: ICD-10-PCS | Mod: CPTII,S$GLB,, | Performed by: NURSE PRACTITIONER

## 2022-11-07 PROCEDURE — 99999 PR PBB SHADOW E&M-EST. PATIENT-LVL V: CPT | Mod: PBBFAC,,, | Performed by: NURSE PRACTITIONER

## 2022-11-07 PROCEDURE — 1159F PR MEDICATION LIST DOCUMENTED IN MEDICAL RECORD: ICD-10-PCS | Mod: CPTII,S$GLB,, | Performed by: NURSE PRACTITIONER

## 2022-11-07 PROCEDURE — 3008F PR BODY MASS INDEX (BMI) DOCUMENTED: ICD-10-PCS | Mod: CPTII,S$GLB,, | Performed by: NURSE PRACTITIONER

## 2022-11-07 PROCEDURE — 1160F PR REVIEW ALL MEDS BY PRESCRIBER/CLIN PHARMACIST DOCUMENTED: ICD-10-PCS | Mod: CPTII,S$GLB,, | Performed by: NURSE PRACTITIONER

## 2022-11-07 PROCEDURE — 3066F NEPHROPATHY DOC TX: CPT | Mod: CPTII,S$GLB,, | Performed by: NURSE PRACTITIONER

## 2022-11-07 PROCEDURE — 3079F DIAST BP 80-89 MM HG: CPT | Mod: CPTII,S$GLB,, | Performed by: NURSE PRACTITIONER

## 2022-11-07 PROCEDURE — 3074F PR MOST RECENT SYSTOLIC BLOOD PRESSURE < 130 MM HG: ICD-10-PCS | Mod: CPTII,S$GLB,, | Performed by: NURSE PRACTITIONER

## 2022-11-07 PROCEDURE — 3066F PR DOCUMENTATION OF TREATMENT FOR NEPHROPATHY: ICD-10-PCS | Mod: CPTII,S$GLB,, | Performed by: NURSE PRACTITIONER

## 2022-11-07 PROCEDURE — 1125F AMNT PAIN NOTED PAIN PRSNT: CPT | Mod: CPTII,S$GLB,, | Performed by: NURSE PRACTITIONER

## 2022-11-07 RX ORDER — CALCITONIN SALMON 200 [IU]/.09ML
1 SPRAY, METERED NASAL DAILY
Qty: 3.7 ML | Refills: 2 | Status: SHIPPED | OUTPATIENT
Start: 2022-11-07 | End: 2022-11-11

## 2022-11-07 NOTE — ASSESSMENT & PLAN NOTE
Hg 10.1. Iron studies adequate    Hold Retacrit. F/u 3 months with cbc to determine need for Retacrit

## 2022-11-07 NOTE — PROGRESS NOTES
Subjective:       Patient ID: Nadia Damon is a 67 y.o. female.    Chief Complaint: review labs. Initiation of Retacrit    HPI: 67 y.o female with h/o of heart transplant in  (on anti-rejection medication), CKD, triple negative breast ca with lymphnode involovement. Initiation of chemotherapy 2021 (Taxotere/Cytoxan) with Udenyca 2021. Unfortunately chemotherapy was complicated by pancytopenia and neutropenic fever resulting in hospitalization x 2. Patient decided on no further chemotherapy. S/p radiation completed 2022    Recently underwent right breast biopsy due to abnormal findings on mammogram with benign pathology.    Today she is here for lab review and consideration for EPO therapy for anemia of CKD. She feels well outside of chronic back pain. Upcoming back injection    Social History     Socioeconomic History    Marital status: Single    Number of children: 2    Highest education level: 11th grade   Occupational History    Occupation: Retired   Tobacco Use    Smoking status: Never    Smokeless tobacco: Never   Substance and Sexual Activity    Alcohol use: Never     Alcohol/week: 0.0 standard drinks    Drug use: No    Sexual activity: Not Currently     Partners: Male     Birth control/protection: See Surgical Hx   Other Topics Concern    Are you pregnant or think you may be? No    Breast-feeding No   Social History Narrative    Single. 2 children , 1  at 31 yoa  2014 strep throat -  pneumonia and renal complications after not completing course of AB. Other child lives in [place], [place]. Has a cousin locally that could help in an emergency. 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 Caffeine intake =- 1 cola a day. No coffee, + occasional tea, avoids caffeine especially at night. Still drives. She does not have a Living Will or Advanced directive.        Past Medical History:   Diagnosis Date    Abdominal wall hernia     CT Renal  6/11/2018---Small fat containing superior ventral abdominal wall hernia at the epicardial pacing lead site.    Abnormal mammogram 10/12/2021    Anxiety     Arthritis     ZEN HIPS    Breast cancer in female 08/2021    LEFT BREAST    C. difficile colitis 11/29/2021    Cellulitis of axilla, left 12/23/2021    Chronic diastolic heart failure 12/16/2021    Chronic kidney disease     stage 4, GFR 15-29 ml/min    Chronic midline low back pain without sciatica 06/18/2018    Closed nondisplaced fracture of distal phalanx of left great toe with routine healing 10/22/2018    Coronary artery disease 1993    heart transplant    Cystitis 5/10/2022    Depression     Fibromyalgia     on Lyrica    Heart failure     native heart cardiomyopathy    Heart transplanted 1993    due to cardiomyopathy    History of hyperparathyroidism; Hyperparathyroidism, secondary renal     PT DENIES    Hypertension     Immune disorder     anti rejection meds    Iron deficiency anemia 08/15/2017    Kidney stones     passed per pt    Obesity     Other osteoporosis without current pathological fracture 08/30/2019    Severe sepsis 11/22/2021    Shingles 2003 approx    left leg    Trouble in sleeping     Urinary incontinence        Family History   Problem Relation Age of Onset    Cancer Mother 38        breast    Breast cancer Mother     Breast cancer Maternal Grandmother     Heart disease Maternal Grandmother     Hypertension Son     Cataracts Cousin     Diabetes Neg Hx     Stroke Neg Hx     Kidney disease Neg Hx     Asthma Neg Hx     COPD Neg Hx     Melanoma Neg Hx     Hyperlipidemia Neg Hx        Past Surgical History:   Procedure Laterality Date    BLADDER SURGERY  2015 approx    mesh - Dr Everett then 2nd reconstructive sx Dr Onofre    BREAST BIOPSY Bilateral     NEGATIVE    BREAST LUMPECTOMY Left 2021    BREAST SURGERY Left 09/28/2015    Bx - benign    BREAST SURGERY Right 12/2015    Bx benign    CARDIAC  PACEMAKER REMOVAL Left 06/26/2014    Pacer defirillator removed. Put in 1993 aat time of heart transplant    CARPAL TUNNEL RELEASE Left 03/03/2015    Dr. Hall    COLONOSCOPY N/A 02/25/2021    Procedure: COLONOSCOPY;  Surgeon: Freida Ramirez MD;  Location: [facility];  Service: Endoscopy;  Laterality: N/A;    HEART TRANSPLANT  1993    HERNIA REPAIR Right 1971 approx    Inguinal    HYSTERECTOMY  1983    vag hyst /LSO     INCISION AND DRAINAGE OF ABSCESS Left 12/24/2021    Procedure: INCISION AND DRAINAGE, ABSCESS;  Surgeon: Joseph Longo MD;  Location: [facility] OR;  Service: General;  Laterality: Left;    INJECTION OF ANESTHETIC AGENT AROUND MEDIAL BRANCH NERVES INNERVATING LUMBAR FACET JOINT Right 10/19/2022    Procedure: Right L4/L5 and L5/S1 MBB;  Surgeon: Jassi Pierre MD;  Location: [facility]IN [facility]GT;  Service: Pain Management;  Laterality: Right;    INJECTION OF ANESTHETIC AGENT INTO SACROILIAC JOINT Right 08/22/2022    Procedure: Right SIJ Injection Right L5/S1 Facte Injection;  Surgeon: Jassi Pierre MD;  Location: [facility];  Service: Pain Management;  Laterality: Right;    INSERTION OF TUNNELED CENTRAL VENOUS CATHETER (CVC) WITH SUBCUTANEOUS PORT N/A 11/09/2021    Procedure: [id number];  Surgeon: Christoph Douglas MD;  Location: [facility] OR;  Service: General;  Laterality: N/A;    REMOVAL OF VASCULAR ACCESS PORT      SENTINEL LYMPH NODE BIOPSY Left 10/12/2021    Procedure: BIOPSY, LYMPH NODE, SENTINEL;  Surgeon: Christoph Douglas MD;  Location: [facility];  Service: General;  Laterality: Left;    TOE SURGERY         Review of Systems   Constitutional:  Negative for activity change, appetite change, chills, fatigue, fever and unexpected weight change.   HENT:  Negative for congestion, mouth sores, nosebleeds, sore throat, trouble swallowing and voice change.    Eyes:  Negative for visual disturbance.   Respiratory:  Negative for cough, chest tightness, shortness of breath and wheezing.     Cardiovascular:  Negative for chest pain, palpitations and leg swelling.   Gastrointestinal:  Negative for abdominal distention, abdominal pain, anal bleeding, blood in stool, constipation, diarrhea, nausea and vomiting.   Genitourinary:  Negative for difficulty urinating, dysuria and hematuria.   Musculoskeletal:  Positive for back pain (sees pain management). Negative for arthralgias and myalgias.        Breast pain s/p mammogram   Skin:  Negative for pallor, rash and wound.   Neurological:  Negative for dizziness, syncope, weakness and headaches.   Hematological:  Negative for adenopathy. Does not bruise/bleed easily.   Psychiatric/Behavioral:  The patient is nervous/anxious.        Medication List with Changes/Refills   Current Medications    AMLODIPINE (NORVASC) 2.5 MG TABLET        ASPIRIN (ECOTRIN) 81 MG EC TABLET    Take 1 tablet (81 mg total) by mouth once daily.    BACLOFEN (LIORESAL) 10 MG TABLET    Take 10 mg by mouth once daily.    BIOTIN 10,000 MCG CAP    Take 1 tablet by mouth once daily.    BUSPIRONE (BUSPAR) 10 MG TABLET    Take 1 tablet (10 mg total) by mouth once daily.    CARVEDILOL (COREG) 6.25 MG TABLET    Take 1 tablet (6.25 mg total) by mouth 2 (two) times daily with meals.    CYCLOSPORINE MODIFIED, NEORAL, (NEORAL) 25 MG CAPSULE    TAKE 3 CAPSULES (75 MG TOTAL) BY MOUTH 2 (TWO) TIMES DAILY.    DULOXETINE (CYMBALTA) 30 MG CAPSULE    TAKE 1 CAPSULE EVERY DAY    EVENING PRIMROSE OIL ORAL    Take 1,000 mg by mouth once daily.    FUROSEMIDE (LASIX) 20 MG TABLET    Take 1 tablet (20 mg total) by mouth 2 (two) times a day for 5 days, THEN 1 tablet (20 mg total) once daily. Take 1 tablet daily.    HYDRALAZINE (APRESOLINE) 50 MG TABLET    Take 1 tablet (50 mg total) by mouth every 8 (eight) hours. TAKE 1 TABLETS  EVERY 8  HOURS.    MULTIVITAMIN CAPSULE    Take 1 capsule by mouth once daily.    PANTOPRAZOLE (PROTONIX) 40 MG TABLET    TAKE 1 TABLET EVERY DAY    PREGABALIN (LYRICA) 50 MG CAPSULE     TAKE 1 CAPSULE 2 TIMES DAILY AT NOON AND NIGHT TIME    RETACRIT 20,000 UNIT/ML INJECTION        TEMAZEPAM (RESTORIL) 30 MG CAPSULE    TAKE 1 CAPSULE AT BEDTIME    TIZANIDINE (ZANAFLEX) 4 MG TABLET    Take 1 tablet (4 mg total) by mouth 2 (two) times daily as needed.    VITAMIN E 400 UNIT CAPSULE    Take 400 Units by mouth once daily.    ZOLPIDEM (AMBIEN) 10 MG TAB    TAKE 1 TABLET BY MOUTH ONCE DAILY IN THE EVENING   Changed and/or Refilled Medications    Modified Medication Previous Medication    CALCITONIN, SALMON, (FORTICAL) 200 UNIT/ACTUATION NASAL SPRAY calcitonin, salmon, (FORTICAL) 200 unit/actuation nasal spray       1 spray by Nasal route once daily. Alternate nostrils - give with calcium, vitamin D    1 spray by Nasal route once daily. Alternate nostrils - give with calcium, vitamin D     Objective:     Vitals:    11/07/22 1259   BP: 124/80   Pulse: 92   Temp: 97.8 °F (36.6 °C)     Lab Results   Component Value Date    WBC 3.42 (L) 11/03/2022    HGB 10.0 (L) 11/03/2022    HCT 31.3 (L) 11/03/2022    MCV 88 11/03/2022     11/03/2022       BMP  Lab Results   Component Value Date     11/03/2022    K 5.2 (H) 11/03/2022     11/03/2022    CO2 24 11/03/2022    BUN 39 (H) 11/03/2022    CREATININE 2.7 (H) 11/03/2022    CALCIUM 9.1 11/03/2022    ANIONGAP 12 11/03/2022    ESTGFRAFRICA 19 (A) 07/14/2022    EGFRNONAA 17 (A) 07/14/2022     Lab Results   Component Value Date    ALT 17 11/03/2022    AST 27 11/03/2022    ALKPHOS 130 11/03/2022    BILITOT 0.5 11/03/2022     Lab Results   Component Value Date    IRON 64 11/03/2022    TIBC 278 11/03/2022    FERRITIN 163 11/03/2022       Physical Exam  Vitals reviewed.   Constitutional:       Appearance: She is well-developed.   HENT:      Head: Normocephalic.      Right Ear: External ear normal.      Left Ear: External ear normal.   Eyes:      General: Lids are normal. No scleral icterus.        Right eye: No discharge.         Left eye: No discharge.       Conjunctiva/sclera: Conjunctivae normal.   Neck:      Thyroid: No thyroid mass.   Cardiovascular:      Rate and Rhythm: Normal rate and regular rhythm.      Heart sounds: Normal heart sounds. No murmur heard.  Pulmonary:      Effort: Pulmonary effort is normal. No respiratory distress.      Breath sounds: Normal breath sounds. No wheezing or rales.   Abdominal:      General: Bowel sounds are normal. There is no distension.      Palpations: Abdomen is soft.   Genitourinary:     Comments: deferred  Musculoskeletal:         General: Normal range of motion.      Cervical back: Normal range of motion.   Skin:     General: Skin is warm and dry.   Neurological:      Mental Status: She is alert and oriented to person, place, and time.   Psychiatric:         Speech: Speech normal.         Behavior: Behavior normal. Behavior is cooperative.         Thought Content: Thought content normal.        Assessment:     Problem List Items Addressed This Visit          Oncology    Ductal carcinoma in situ (DCIS) of left breast - Primary     Recently had abnormal right breast mammogram. Underwent biopsy with benign pathology    Prior left breast radiation    Plan to arrange breast cancer survivorship appointment with Kristine in 6 months         Relevant Orders    CBC Auto Differential    Basic Metabolic Panel    Anemia associated with stage 4 chronic renal failure     Hg 10.1. Iron studies adequate    Hold Retacrit. F/u 3 months with cbc to determine need for Retacrit         Relevant Orders    CBC Auto Differential    Basic Metabolic Panel    Leukopenia     Secondary to immunosuppressants               Plan:     Ductal carcinoma in situ (DCIS) of left breast  -     CBC Auto Differential; Future; Expected date: 11/07/2022  -     Basic Metabolic Panel; Future; Expected date: 11/07/2022    Anemia associated with stage 4 chronic renal failure  -     CBC Auto Differential; Future; Expected date: 11/07/2022  -     Basic Metabolic Panel;  Future; Expected date: 11/07/2022    Leukopenia, unspecified type    Route Chart for Scheduling    Med Onc Chart Routing      Follow up with physician    Follow up with LIA 4 weeks.   Infusion scheduling note possible Retacrit   Injection scheduling note    Labs CBC   Lab interval:  BMP   Imaging    Pharmacy appointment No pharmacy appointment needed      Other referrals No additional referrals needed           Therapy Plan Information  epoetin tristan-epbx injection 20,000 Units  20,000 Units, Subcutaneous, PRN          ABELARDO العراقي

## 2022-11-07 NOTE — ASSESSMENT & PLAN NOTE
Recently had abnormal right breast mammogram. Underwent biopsy with benign pathology    Prior left breast radiation    Plan to arrange breast cancer survivorship appointment with Kristine in 6 months

## 2022-11-09 ENCOUNTER — HOSPITAL ENCOUNTER (OUTPATIENT)
Facility: HOSPITAL | Age: 67
Discharge: HOME | End: 2022-11-09
Attending: ANESTHESIOLOGY | Admitting: ANESTHESIOLOGY
Payer: MEDICARE

## 2022-11-09 VITALS
DIASTOLIC BLOOD PRESSURE: 71 MMHG | BODY MASS INDEX: 31.48 KG/M2 | TEMPERATURE: 98 F | HEART RATE: 79 BPM | RESPIRATION RATE: 15 BRPM | SYSTOLIC BLOOD PRESSURE: 122 MMHG | WEIGHT: 171.06 LBS | HEIGHT: 62 IN | OXYGEN SATURATION: 99 %

## 2022-11-09 DIAGNOSIS — M47.816 LUMBAR SPONDYLOSIS: Primary | ICD-10-CM

## 2022-11-09 DIAGNOSIS — M54.16 LUMBAR RADICULOPATHY: ICD-10-CM

## 2022-11-09 PROCEDURE — 64493 INJ PARAVERT F JNT L/S 1 LEV: CPT | Performed by: ANESTHESIOLOGY

## 2022-11-09 PROCEDURE — 25000003 PHARM REV CODE 250: Performed by: ANESTHESIOLOGY

## 2022-11-09 PROCEDURE — 64493 INJ PARAVERT F JNT L/S 1 LEV: CPT | Mod: RT,,, | Performed by: ANESTHESIOLOGY

## 2022-11-09 PROCEDURE — 63600175 PHARM REV CODE 636 W HCPCS: Performed by: ANESTHESIOLOGY

## 2022-11-09 PROCEDURE — 64494 INJ PARAVERT F JNT L/S 2 LEV: CPT | Performed by: ANESTHESIOLOGY

## 2022-11-09 PROCEDURE — 64493 PR INJ DX/THER AGNT PARAVERT FACET JOINT,IMG GUIDE,LUMBAR/SAC,1ST LVL: ICD-10-PCS | Mod: RT,,, | Performed by: ANESTHESIOLOGY

## 2022-11-09 PROCEDURE — 64494 PR INJ DX/THER AGNT PARAVERT FACET JOINT,IMG GUIDE,LUMBAR/SAC, 2ND LEVEL: ICD-10-PCS | Mod: RT,,, | Performed by: ANESTHESIOLOGY

## 2022-11-09 PROCEDURE — 64494 INJ PARAVERT F JNT L/S 2 LEV: CPT | Mod: RT,,, | Performed by: ANESTHESIOLOGY

## 2022-11-09 RX ORDER — FENTANYL CITRATE 50 UG/ML
INJECTION, SOLUTION INTRAMUSCULAR; INTRAVENOUS
Status: DISCONTINUED | OUTPATIENT
Start: 2022-11-09 | End: 2022-11-09 | Stop reason: HOSPADM

## 2022-11-09 RX ORDER — BUPIVACAINE HYDROCHLORIDE 5 MG/ML
INJECTION, SOLUTION EPIDURAL; INTRACAUDAL
Status: DISCONTINUED | OUTPATIENT
Start: 2022-11-09 | End: 2022-11-09 | Stop reason: HOSPADM

## 2022-11-09 RX ORDER — ONDANSETRON 2 MG/ML
4 INJECTION INTRAMUSCULAR; INTRAVENOUS ONCE AS NEEDED
Status: COMPLETED | OUTPATIENT
Start: 2022-11-09 | End: 2022-11-09

## 2022-11-09 RX ORDER — MIDAZOLAM HYDROCHLORIDE 1 MG/ML
INJECTION, SOLUTION INTRAMUSCULAR; INTRAVENOUS
Status: DISCONTINUED | OUTPATIENT
Start: 2022-11-09 | End: 2022-11-09 | Stop reason: HOSPADM

## 2022-11-09 RX ADMIN — ONDANSETRON 4 MG: 2 INJECTION INTRAMUSCULAR; INTRAVENOUS at 07:11

## 2022-11-09 NOTE — DISCHARGE SUMMARY
Discharge Note  Short Stay      SUMMARY     Admit Date: 11/9/2022    Attending Physician: Jassi Pierre MD        Discharge Physician: Jassi Pierre MD        Discharge Date: 11/9/2022 9:01 AM    Procedure(s) (LRB):  Right L4/L5 and L5/S1 MBB (Right)    Final Diagnosis: Lumbar spondylosis [M47.816]    Disposition: Home or self care    Patient Instructions:   Current Discharge Medication List        CONTINUE these medications which have NOT CHANGED    Details   amLODIPine (NORVASC) 2.5 MG tablet       carvediloL (COREG) 6.25 MG tablet Take 1 tablet (6.25 mg total) by mouth 2 (two) times daily with meals.  Qty: 180 tablet, Refills: 3      cycloSPORINE modified, NEORAL, (NEORAL) 25 MG capsule TAKE 3 CAPSULES (75 MG TOTAL) BY MOUTH 2 (TWO) TIMES DAILY.  Qty: 540 capsule, Refills: 6    Associated Diagnoses: Heart transplanted      hydrALAZINE (APRESOLINE) 50 MG tablet Take 1 tablet (50 mg total) by mouth every 8 (eight) hours. TAKE 1 TABLETS  EVERY 8  HOURS.  Qty: 270 tablet, Refills: 3    Associated Diagnoses: Essential hypertension      aspirin (ECOTRIN) 81 MG EC tablet Take 1 tablet (81 mg total) by mouth once daily.  Qty: 90 tablet, Refills: 3      baclofen (LIORESAL) 10 MG tablet Take 10 mg by mouth once daily.      biotin 10,000 mcg Cap Take 1 tablet by mouth once daily.      busPIRone (BUSPAR) 10 MG tablet Take 1 tablet (10 mg total) by mouth once daily.  Qty: 90 tablet, Refills: 3    Associated Diagnoses: Anxiety      calcitonin, salmon, (FORTICAL) 200 unit/actuation nasal spray 1 spray by Nasal route once daily. Alternate nostrils - give with calcium, vitamin D  Qty: 3.7 mL, Refills: 2    Comments: NEW RX REQUEST FOR PATIENT DUE TO USING NEW MAIL ORDER PHARMACY-[facility]      DULoxetine (CYMBALTA) 30 MG capsule TAKE 1 CAPSULE EVERY DAY  Qty: 90 capsule, Refills: 1    Comments: To start once complete duloxetine prescription sent to local pharmacy today  Associated Diagnoses: Fibromyalgia       EVENING PRIMROSE OIL ORAL Take 1,000 mg by mouth once daily.      furosemide (LASIX) 20 MG tablet Take 1 tablet (20 mg total) by mouth 2 (two) times a day for 5 days, THEN 1 tablet (20 mg total) once daily. Take 1 tablet daily.  Qty: 370 tablet, Refills: 0    Associated Diagnoses: Heart transplanted; Essential hypertension      multivitamin capsule Take 1 capsule by mouth once daily.      pantoprazole (PROTONIX) 40 MG tablet TAKE 1 TABLET EVERY DAY  Qty: 90 tablet, Refills: 1    Associated Diagnoses: Gastroesophageal reflux disease, unspecified whether esophagitis present      pregabalin (LYRICA) 50 MG capsule TAKE 1 CAPSULE 2 TIMES DAILY AT NOON AND NIGHT TIME  Qty: 180 capsule, Refills: 1    Associated Diagnoses: Fibromyalgia      RETACRIT 20,000 unit/mL injection       temazepam (RESTORIL) 30 mg capsule TAKE 1 CAPSULE AT BEDTIME  Qty: 30 capsule, Refills: 5    Associated Diagnoses: Insomnia, unspecified type      tiZANidine (ZANAFLEX) 4 MG tablet Take 1 tablet (4 mg total) by mouth 2 (two) times daily as needed.  Qty: 180 tablet, Refills: 0    Associated Diagnoses: Lumbar spondylosis      vitamin E 400 UNIT capsule Take 400 Units by mouth once daily.      zolpidem (AMBIEN) 10 mg Tab TAKE 1 TABLET BY MOUTH ONCE DAILY IN THE EVENING  Qty: 90 tablet, Refills: 1    Associated Diagnoses: Primary insomnia                 Discharge Diagnosis: Lumbar spondylosis [M47.816]  Condition on Discharge: Stable with no complications to procedure   Diet on Discharge: Same as before.  Activity: as per instruction sheet.  Discharge to: Home with a responsible adult.  Follow up: 2-4 weeks       Please call the office at [phone] if you experience any weakness or loss of sensation, fever > 101.5, pain uncontrolled with oral medications, persistent nausea/vomiting/or diarrhea, redness or drainage from the incisions, or any other worrisome concerns. If physician on call was not reached or could not communicate with our office  for any reason please go to the nearest emergency department

## 2022-11-09 NOTE — OP NOTE
LUMBAR Medial Branch Block Under Fluoroscopy    INFORMED CONSENT: The procedure, risks, benefits and options were discussed with patient. There are no contraindications to the procedure. The patient expressed understanding and agreed to proceed. The personnel performing the procedure was discussed.    Date of procedure 11/09/2022    Time-out taken to identify patient and procedure side prior to starting the procedure.                                                                PROCEDURE:  Right medial branch block at the transverse processes at the level of L4, L5, and the sacral ala    Pre Procedure diagnosis:    Lumbar spondylosis [M47.816]  1. Lumbar spondylosis        Post-Procedure diagnosis:   same    PHYSICIAN: Jassi Pierre MD  ASSISTANTS: None    MEDICATIONS INJECTED: 0.5% bupivicane, 1mL at each level    LOCAL ANESTHETIC USED: Lidocaine, 1%, 1ml at each level    ESTIMATED BLOOD LOSS:  None.    COMPLICATIONS:  None.    Sedation: Conscious sedation provided by M.BRITTANI    SEDATION MEDICATIONS: local/IV sedation: Versed 2 mg and fentanyl 50 mcg IV.  Conscious sedation ordered by MD.  Patient reevaluated and sedation administered by MD and monitored by RN.  Total sedation time was less than 10 min.    Total sedation time was <10 min      TECHNIQUE: Laying in a prone position, the patient was prepped and draped in the usual sterile fashion using ChloraPrep and fenestrated drape.  The level was determined under fluoroscopic guidance.  Local anesthetic was given by going down to the hub of the 27-gauge 1.25in needle and raising a wheel.  A 25-gauge 3.5inch needle was introduced to the anatomic local of the medial branch at each of the above levels using fluoroscopy in the AP, oblique, and lateral views.  After negative aspiration, medication was injected slowly. The patient tolerated the procedure well.       The patient was monitored after the procedure.  Patient was given post procedure and discharge  instructions to follow at home.  We will see the patient back in two weeks or the patient may call to inform of status. The patient was discharged in a stable condition

## 2022-11-09 NOTE — H&P
HPI  Patient presenting for Procedure(s) (LRB):  Right L4/L5 and L5/S1 MBB (Right)     Patient on Anti-coagulation No    No health changes since previous encounter    Past Medical History:   Diagnosis Date    Abdominal wall hernia     CT Renal 6/11/2018---Small fat containing superior ventral abdominal wall hernia at the epicardial pacing lead site.    Abnormal mammogram 10/12/2021    Anxiety     Arthritis     ZEN HIPS    Breast cancer in female 08/2021    LEFT BREAST    C. difficile colitis 11/29/2021    Cellulitis of axilla, left 12/23/2021    Chronic diastolic heart failure 12/16/2021    Chronic kidney disease     stage 4, GFR 15-29 ml/min    Chronic midline low back pain without sciatica 06/18/2018    Closed nondisplaced fracture of distal phalanx of left great toe with routine healing 10/22/2018    Coronary artery disease 1993    heart transplant    Cystitis 5/10/2022    Depression     Fibromyalgia     on Lyrica    Heart failure     native heart cardiomyopathy    Heart transplanted 1993    due to cardiomyopathy    History of hyperparathyroidism; Hyperparathyroidism, secondary renal     PT DENIES    Hypertension     Immune disorder     anti rejection meds    Iron deficiency anemia 08/15/2017    Kidney stones     passed per pt    Obesity     Other osteoporosis without current pathological fracture 08/30/2019    Severe sepsis 11/22/2021    Shingles 2003 approx    left leg    Trouble in sleeping     Urinary incontinence      Past Surgical History:   Procedure Laterality Date    BLADDER SURGERY  2015 approx    mesh - Dr Everett then 2nd reconstructive sx Dr Onofre    BREAST BIOPSY Bilateral     NEGATIVE    BREAST LUMPECTOMY Left 2021    BREAST SURGERY Left 09/28/2015    Bx - benign    BREAST SURGERY Right 12/2015    Bx benign    CARDIAC PACEMAKER REMOVAL Left 06/26/2014    Pacer defirillator removed. Put in 1993 aat time of heart transplant    CARPAL TUNNEL RELEASE Left 03/03/2015    Dr. Hall    COLONOSCOPY N/A  02/25/2021    Procedure: COLONOSCOPY;  Surgeon: Freida Ramirez MD;  Location: [facility] ENDO;  Service: Endoscopy;  Laterality: N/A;    HEART TRANSPLANT  1993    HERNIA REPAIR Right 1971 approx    Inguinal    HYSTERECTOMY  1983    vag hyst /LSO     INCISION AND DRAINAGE OF ABSCESS Left 12/24/2021    Procedure: INCISION AND DRAINAGE, ABSCESS;  Surgeon: Joseph Longo MD;  Location: [facility] OR;  Service: General;  Laterality: Left;    INJECTION OF ANESTHETIC AGENT AROUND MEDIAL BRANCH NERVES INNERVATING LUMBAR FACET JOINT Right 10/19/2022    Procedure: Right L4/L5 and L5/S1 MBB;  Surgeon: Jassi Pierre MD;  Location: [facility] PAIN MGT;  Service: Pain Management;  Laterality: Right;    INJECTION OF ANESTHETIC AGENT INTO SACROILIAC JOINT Right 08/22/2022    Procedure: Right SIJ Injection Right L5/S1 Facte Injection;  Surgeon: Jassi Pierre MD;  Location: [facility] PAIN MGT;  Service: Pain Management;  Laterality: Right;    INSERTION OF TUNNELED CENTRAL VENOUS CATHETER (CVC) WITH SUBCUTANEOUS PORT N/A 11/09/2021    Procedure: [id number];  Surgeon: Christoph Douglas MD;  Location: [facility] OR;  Service: General;  Laterality: N/A;    REMOVAL OF VASCULAR ACCESS PORT      SENTINEL LYMPH NODE BIOPSY Left 10/12/2021    Procedure: BIOPSY, LYMPH NODE, SENTINEL;  Surgeon: Christoph Douglas MD;  Location: [facility] OR;  Service: General;  Laterality: Left;    TOE SURGERY       Review of patient's allergies indicates:   Allergen Reactions    Lisinopril Swelling and Rash    Augmentin [amoxicillin-pot clavulanate] Diarrhea        Current Facility-Administered Medications on File Prior to Encounter   Medication Dose Route Frequency Provider Last Rate Last Admin    lactated ringers infusion   Intravenous Continuous Jennifer Carr MD 10 mL/hr at 11/09/21 0717 Restarted at 11/09/21 0820     Current Outpatient Medications on File Prior to Encounter   Medication Sig Dispense Refill    amLODIPine (NORVASC) 2.5 MG tablet       carvediloL (COREG) 6.25  "MG tablet Take 1 tablet (6.25 mg total) by mouth 2 (two) times daily with meals. 180 tablet 3    cycloSPORINE modified, NEORAL, (NEORAL) 25 MG capsule TAKE 3 CAPSULES (75 MG TOTAL) BY MOUTH 2 (TWO) TIMES DAILY. 540 capsule 6    hydrALAZINE (APRESOLINE) 50 MG tablet Take 1 tablet (50 mg total) by mouth every 8 (eight) hours. TAKE 1 TABLETS  EVERY 8  HOURS. 270 tablet 3    aspirin (ECOTRIN) 81 MG EC tablet Take 1 tablet (81 mg total) by mouth once daily. 90 tablet 3    baclofen (LIORESAL) 10 MG tablet Take 10 mg by mouth once daily.      biotin 10,000 mcg Cap Take 1 tablet by mouth once daily.      busPIRone (BUSPAR) 10 MG tablet Take 1 tablet (10 mg total) by mouth once daily. 90 tablet 3    DULoxetine (CYMBALTA) 30 MG capsule TAKE 1 CAPSULE EVERY DAY 90 capsule 1    EVENING PRIMROSE OIL ORAL Take 1,000 mg by mouth once daily.      furosemide (LASIX) 20 MG tablet Take 1 tablet (20 mg total) by mouth 2 (two) times a day for 5 days, THEN 1 tablet (20 mg total) once daily. Take 1 tablet daily. 370 tablet 0    multivitamin capsule Take 1 capsule by mouth once daily.      pantoprazole (PROTONIX) 40 MG tablet TAKE 1 TABLET EVERY DAY 90 tablet 1    pregabalin (LYRICA) 50 MG capsule TAKE 1 CAPSULE 2 TIMES DAILY AT NOON AND NIGHT TIME 180 capsule 1    RETACRIT 20,000 unit/mL injection       vitamin E 400 UNIT capsule Take 400 Units by mouth once daily.      zolpidem (AMBIEN) 10 mg Tab TAKE 1 TABLET BY MOUTH ONCE DAILY IN THE EVENING 90 tablet 1        PMHx, PSHx, Allergies, Medications reviewed in epic    ROS negative except pain complaints in HPI    OBJECTIVE:    BP (!) 153/85   Pulse 87   Temp 97.4 °F (36.3 °C)   Resp 15   Ht 5' 2" (1.575 m)   Wt 77.6 kg (171 lb 1.2 oz)   LMP 06/20/1983 (Within Years)   SpO2 99%   Breastfeeding No   BMI 31.29 kg/m²     PHYSICAL EXAMINATION:    GENERAL: Well appearing, in no acute distress, alert and oriented x3.  PSYCH:  Mood and affect appropriate.  SKIN: Skin color, texture, " turgor normal, no rashes or lesions which will impact the procedure.  CV: RRR with palpation of the radial artery.  PULM: No evidence of respiratory difficulty, symmetric chest rise. Clear to auscultation.  NEURO: Cranial nerves grossly intact.    Plan:    Proceed with procedure as planned Procedure(s) (LRB):  Right L4/L5 and L5/S1 MBB (Right)    Jassi Pierre MD  11/09/2022

## 2022-11-09 NOTE — DISCHARGE INSTRUCTIONS

## 2022-11-10 ENCOUNTER — TELEPHONE (OUTPATIENT)
Dept: PAIN MEDICINE | Facility: CLINIC | Age: 67
End: 2022-11-10
Payer: MEDICARE

## 2022-11-10 DIAGNOSIS — M47.816 LUMBAR SPONDYLOSIS: Primary | ICD-10-CM

## 2022-11-10 NOTE — TELEPHONE ENCOUNTER
Patient reports 80% relief from right L4/L5 and L5/S1 MBB on 11/9/22. This is her second positive MBB. We will proceed with RFA.

## 2022-11-11 ENCOUNTER — LAB VISIT (OUTPATIENT)
Dept: LAB | Facility: HOSPITAL | Age: 67
End: 2022-11-11
Attending: INTERNAL MEDICINE
Payer: MEDICARE

## 2022-11-11 ENCOUNTER — OFFICE VISIT (OUTPATIENT)
Dept: PRIMARY CARE CLINIC | Facility: CLINIC | Age: 67
End: 2022-11-11
Payer: MEDICARE

## 2022-11-11 VITALS
BODY MASS INDEX: 31.38 KG/M2 | TEMPERATURE: 98 F | WEIGHT: 170.5 LBS | HEIGHT: 62 IN | SYSTOLIC BLOOD PRESSURE: 124 MMHG | DIASTOLIC BLOOD PRESSURE: 72 MMHG | HEART RATE: 86 BPM | OXYGEN SATURATION: 98 %

## 2022-11-11 DIAGNOSIS — Z79.899 IMMUNOSUPPRESSION DUE TO DRUG THERAPY: ICD-10-CM

## 2022-11-11 DIAGNOSIS — N18.4 CKD (CHRONIC KIDNEY DISEASE) STAGE 4, GFR 15-29 ML/MIN: ICD-10-CM

## 2022-11-11 DIAGNOSIS — I12.9 PARENCHYMAL RENAL HYPERTENSION, STAGE 1 THROUGH STAGE 4 OR UNSPECIFIED CHRONIC KIDNEY DISEASE: ICD-10-CM

## 2022-11-11 DIAGNOSIS — I10 ESSENTIAL HYPERTENSION: ICD-10-CM

## 2022-11-11 DIAGNOSIS — M81.8 OTHER OSTEOPOROSIS WITHOUT CURRENT PATHOLOGICAL FRACTURE: Primary | ICD-10-CM

## 2022-11-11 DIAGNOSIS — D84.821 IMMUNOSUPPRESSION DUE TO DRUG THERAPY: ICD-10-CM

## 2022-11-11 LAB
ALBUMIN SERPL BCP-MCNC: 3.5 G/DL (ref 3.5–5.2)
ANION GAP SERPL CALC-SCNC: 11 MMOL/L (ref 8–16)
BUN SERPL-MCNC: 41 MG/DL (ref 8–23)
CALCIUM SERPL-MCNC: 9 MG/DL (ref 8.7–10.5)
CHLORIDE SERPL-SCNC: 105 MMOL/L (ref 95–110)
CO2 SERPL-SCNC: 24 MMOL/L (ref 23–29)
CREAT SERPL-MCNC: 2.9 MG/DL (ref 0.5–1.4)
CREAT UR-MCNC: 50 MG/DL (ref 15–325)
EST. GFR  (NO RACE VARIABLE): 17.1 ML/MIN/1.73 M^2
GLUCOSE SERPL-MCNC: 100 MG/DL (ref 70–110)
PHOSPHATE SERPL-MCNC: 4.3 MG/DL (ref 2.7–4.5)
POTASSIUM SERPL-SCNC: 5 MMOL/L (ref 3.5–5.1)
PROT UR-MCNC: 16 MG/DL (ref 0–15)
PROT/CREAT UR: 0.32 MG/G{CREAT} (ref 0–0.2)
SODIUM SERPL-SCNC: 140 MMOL/L (ref 136–145)

## 2022-11-11 PROCEDURE — 80069 RENAL FUNCTION PANEL: CPT | Performed by: INTERNAL MEDICINE

## 2022-11-11 PROCEDURE — 36415 COLL VENOUS BLD VENIPUNCTURE: CPT | Performed by: INTERNAL MEDICINE

## 2022-11-11 PROCEDURE — 99999 PR PBB SHADOW E&M-EST. PATIENT-LVL IV: ICD-10-PCS | Mod: PBBFAC,,, | Performed by: NURSE PRACTITIONER

## 2022-11-11 PROCEDURE — 3008F BODY MASS INDEX DOCD: CPT | Mod: CPTII,S$GLB,, | Performed by: NURSE PRACTITIONER

## 2022-11-11 PROCEDURE — 4010F PR ACE/ARB THEARPY RXD/TAKEN: ICD-10-PCS | Mod: CPTII,S$GLB,, | Performed by: NURSE PRACTITIONER

## 2022-11-11 PROCEDURE — 1126F PR PAIN SEVERITY QUANTIFIED, NO PAIN PRESENT: ICD-10-PCS | Mod: CPTII,S$GLB,, | Performed by: NURSE PRACTITIONER

## 2022-11-11 PROCEDURE — 3008F PR BODY MASS INDEX (BMI) DOCUMENTED: ICD-10-PCS | Mod: CPTII,S$GLB,, | Performed by: NURSE PRACTITIONER

## 2022-11-11 PROCEDURE — 3074F PR MOST RECENT SYSTOLIC BLOOD PRESSURE < 130 MM HG: ICD-10-PCS | Mod: CPTII,S$GLB,, | Performed by: NURSE PRACTITIONER

## 2022-11-11 PROCEDURE — 90694 FLU VACCINE - QUADRIVALENT - ADJUVANTED: ICD-10-PCS | Mod: S$GLB,,, | Performed by: NURSE PRACTITIONER

## 2022-11-11 PROCEDURE — 3074F SYST BP LT 130 MM HG: CPT | Mod: CPTII,S$GLB,, | Performed by: NURSE PRACTITIONER

## 2022-11-11 PROCEDURE — 99999 PR PBB SHADOW E&M-EST. PATIENT-LVL IV: CPT | Mod: PBBFAC,,, | Performed by: NURSE PRACTITIONER

## 2022-11-11 PROCEDURE — 4010F ACE/ARB THERAPY RXD/TAKEN: CPT | Mod: CPTII,S$GLB,, | Performed by: NURSE PRACTITIONER

## 2022-11-11 PROCEDURE — G0008 ADMIN INFLUENZA VIRUS VAC: HCPCS | Mod: S$GLB,,, | Performed by: NURSE PRACTITIONER

## 2022-11-11 PROCEDURE — 1157F PR ADVANCE CARE PLAN OR EQUIV PRESENT IN MEDICAL RECORD: ICD-10-PCS | Mod: CPTII,S$GLB,, | Performed by: NURSE PRACTITIONER

## 2022-11-11 PROCEDURE — 3066F PR DOCUMENTATION OF TREATMENT FOR NEPHROPATHY: ICD-10-PCS | Mod: CPTII,S$GLB,, | Performed by: NURSE PRACTITIONER

## 2022-11-11 PROCEDURE — 99215 PR OFFICE/OUTPT VISIT, EST, LEVL V, 40-54 MIN: ICD-10-PCS | Mod: S$GLB,,, | Performed by: NURSE PRACTITIONER

## 2022-11-11 PROCEDURE — 1101F PT FALLS ASSESS-DOCD LE1/YR: CPT | Mod: CPTII,S$GLB,, | Performed by: NURSE PRACTITIONER

## 2022-11-11 PROCEDURE — 99215 OFFICE O/P EST HI 40 MIN: CPT | Mod: S$GLB,,, | Performed by: NURSE PRACTITIONER

## 2022-11-11 PROCEDURE — 3288F FALL RISK ASSESSMENT DOCD: CPT | Mod: CPTII,S$GLB,, | Performed by: NURSE PRACTITIONER

## 2022-11-11 PROCEDURE — 90694 VACC AIIV4 NO PRSRV 0.5ML IM: CPT | Mod: S$GLB,,, | Performed by: NURSE PRACTITIONER

## 2022-11-11 PROCEDURE — G0008 FLU VACCINE - QUADRIVALENT - ADJUVANTED: ICD-10-PCS | Mod: S$GLB,,, | Performed by: NURSE PRACTITIONER

## 2022-11-11 PROCEDURE — 83970 ASSAY OF PARATHORMONE: CPT | Performed by: INTERNAL MEDICINE

## 2022-11-11 PROCEDURE — 1126F AMNT PAIN NOTED NONE PRSNT: CPT | Mod: CPTII,S$GLB,, | Performed by: NURSE PRACTITIONER

## 2022-11-11 PROCEDURE — 82570 ASSAY OF URINE CREATININE: CPT | Performed by: INTERNAL MEDICINE

## 2022-11-11 PROCEDURE — 1157F ADVNC CARE PLAN IN RCRD: CPT | Mod: CPTII,S$GLB,, | Performed by: NURSE PRACTITIONER

## 2022-11-11 PROCEDURE — 1101F PR PT FALLS ASSESS DOC 0-1 FALLS W/OUT INJ PAST YR: ICD-10-PCS | Mod: CPTII,S$GLB,, | Performed by: NURSE PRACTITIONER

## 2022-11-11 PROCEDURE — 3066F NEPHROPATHY DOC TX: CPT | Mod: CPTII,S$GLB,, | Performed by: NURSE PRACTITIONER

## 2022-11-11 PROCEDURE — 3288F PR FALLS RISK ASSESSMENT DOCUMENTED: ICD-10-PCS | Mod: CPTII,S$GLB,, | Performed by: NURSE PRACTITIONER

## 2022-11-11 PROCEDURE — 3078F DIAST BP <80 MM HG: CPT | Mod: CPTII,S$GLB,, | Performed by: NURSE PRACTITIONER

## 2022-11-11 PROCEDURE — 3078F PR MOST RECENT DIASTOLIC BLOOD PRESSURE < 80 MM HG: ICD-10-PCS | Mod: CPTII,S$GLB,, | Performed by: NURSE PRACTITIONER

## 2022-11-11 RX ORDER — CALCITONIN SALMON 200 [IU]/.09ML
1 SPRAY, METERED NASAL DAILY
Qty: 3 EACH | Refills: 3 | Status: SHIPPED | OUTPATIENT
Start: 2022-11-11 | End: 2022-11-17

## 2022-11-11 NOTE — PROGRESS NOTES
Nadia Damon  11/17/2022  [id number]    Luz Dickson NP  Patient Care Team:  Luz Dickson NP as PCP - General (Family Medicine)  Miguel Soni Jr., MD as Consulting Physician (Vascular Surgery)  Ike King MD as Consulting Physician (Cardiology)  Courtney Tubbs MD as Consulting Physician (Cardiology)  Carter Crawford MD as Consulting Physician (Nephrology)  Paxton Vasques OD as Consulting Physician (Optometry)  PRANYA Villalobos MD as Obstetrician (Obstetrics)  Parker Mccarthy IV, MD (Urology)  Ike King MD as Consulting Physician (Cardiology)  Jose Roland MD as Consulting Physician (Dermatology)  Prasanth Johnson MD as Consulting Physician (Rheumatology)  Ayanna Hart RN as Oncology Navigator  Nora Morin LCSW as   Elis Wick MD as Physician (Internal Medicine)  Geri Saha, RN as Outpatient       [facility] Primary Care Note      Chief Complaint:  Chief Complaint   Patient presents with    Follow-up       History of Present Illness:  HPI      One month follow up.   Rt SI pain, breast cyst one month ago.    Breast bx done by Dr Douglas 10/31/22: Right breast (3 o'clock position), biopsy:       -  Benign breast tissue with fibrosis and marked duct ectasia with   microcyst formation and papillary         apocrine metaplasia       -  Negative for atypia or malignancy    Oncology appt -considering Retacrit.     Elevated BP last visit.    Had nerve block Right SI? with good results.     Has appt Dr Crawford next week. Labs done today.          Review of Systems   Constitutional:  Negative for fever and malaise/fatigue.   Eyes:  Negative for blurred vision.   Respiratory:  Negative for cough and shortness of breath.    Cardiovascular:  Negative for chest pain and leg swelling.   Gastrointestinal:  Negative for abdominal pain, constipation, diarrhea, heartburn, nausea and vomiting.   Genitourinary:  Negative for dysuria.   Musculoskeletal:   Negative for myalgias.   Skin:  Negative for rash.   Neurological:  Negative for dizziness, weakness and headaches.   Psychiatric/Behavioral:  Negative for depression. The patient does not have insomnia.        The following were reviewed: Active problem list, medication list, allergies, family history, social history, and Health Maintenance.     History:  Past Medical History:   Diagnosis Date    Abdominal wall hernia     CT Renal 6/11/2018---Small fat containing superior ventral abdominal wall hernia at the epicardial pacing lead site.    Abnormal mammogram 10/12/2021    Anxiety     Arthritis     ZEN HIPS    Breast cancer in female 08/2021    LEFT BREAST    C. difficile colitis 11/29/2021    Cellulitis of axilla, left 12/23/2021    Chronic diastolic heart failure 12/16/2021    Chronic kidney disease     stage 4, GFR 15-29 ml/min    Chronic midline low back pain without sciatica 06/18/2018    Closed nondisplaced fracture of distal phalanx of left great toe with routine healing 10/22/2018    Coronary artery disease 1993    heart transplant    Cystitis 5/10/2022    Depression     Fibromyalgia     on Lyrica    Heart failure     native heart cardiomyopathy    Heart transplanted 1993    due to cardiomyopathy    History of hyperparathyroidism; Hyperparathyroidism, secondary renal     PT DENIES    Hypertension     Immune disorder     anti rejection meds    Iron deficiency anemia 08/15/2017    Kidney stones     passed per pt    Obesity     Other osteoporosis without current pathological fracture 08/30/2019    Severe sepsis 11/22/2021    Shingles 2003 approx    left leg    Trouble in sleeping     Urinary incontinence      Past Surgical History:   Procedure Laterality Date    BLADDER SURGERY  2015 approx    mesh - Dr Everett then 2nd reconstructive sx Dr Onofre    BREAST BIOPSY Bilateral     NEGATIVE    BREAST LUMPECTOMY Left 2021    BREAST SURGERY Left 09/28/2015    Bx - benign    BREAST SURGERY Right 12/2015    Bx benign     CARDIAC PACEMAKER REMOVAL Left 06/26/2014    Pacer defirillator removed. Put in 1993 aat time of heart transplant    CARPAL TUNNEL RELEASE Left 03/03/2015    Dr. Hall    COLONOSCOPY N/A 02/25/2021    Procedure: COLONOSCOPY;  Surgeon: Freida Ramirez MD;  Location: [facility];  Service: Endoscopy;  Laterality: N/A;    HEART TRANSPLANT  1993    HERNIA REPAIR Right 1971 approx    Inguinal    HYSTERECTOMY  1983    vag hyst /LSO     INCISION AND DRAINAGE OF ABSCESS Left 12/24/2021    Procedure: INCISION AND DRAINAGE, ABSCESS;  Surgeon: Joseph Longo MD;  Location: [facility] OR;  Service: General;  Laterality: Left;    INJECTION OF ANESTHETIC AGENT AROUND MEDIAL BRANCH NERVES INNERVATING LUMBAR FACET JOINT Right 10/19/2022    Procedure: Right L4/L5 and L5/S1 MBB;  Surgeon: Jassi Pierre MD;  Location: [facility] PAIN MGT;  Service: Pain Management;  Laterality: Right;    INJECTION OF ANESTHETIC AGENT AROUND MEDIAL BRANCH NERVES INNERVATING LUMBAR FACET JOINT Right 11/9/2022    Procedure: Right L4/L5 and L5/S1 MBB;  Surgeon: Jassi Pierre MD;  Location: [facility] PAIN MGT;  Service: Pain Management;  Laterality: Right;    INJECTION OF ANESTHETIC AGENT INTO SACROILIAC JOINT Right 08/22/2022    Procedure: Right SIJ Injection Right L5/S1 Facte Injection;  Surgeon: Jassi Pierre MD;  Location: [facility] PAIN MGT;  Service: Pain Management;  Laterality: Right;    INSERTION OF TUNNELED CENTRAL VENOUS CATHETER (CVC) WITH SUBCUTANEOUS PORT N/A 11/09/2021    Procedure: [id number];  Surgeon: Christoph Douglas MD;  Location: [facility] OR;  Service: General;  Laterality: N/A;    REMOVAL OF VASCULAR ACCESS PORT      SENTINEL LYMPH NODE BIOPSY Left 10/12/2021    Procedure: BIOPSY, LYMPH NODE, SENTINEL;  Surgeon: Christoph Douglas MD;  Location: [facility] OR;  Service: General;  Laterality: Left;    TOE SURGERY       Family History   Problem Relation Age of Onset    Cancer Mother 38        breast    Breast cancer Mother     Breast cancer  Maternal Grandmother     Heart disease Maternal Grandmother     Hypertension Son     Cataracts Cousin     Diabetes Neg Hx     Stroke Neg Hx     Kidney disease Neg Hx     Asthma Neg Hx     COPD Neg Hx     Melanoma Neg Hx     Hyperlipidemia Neg Hx      Social History     Socioeconomic History    Marital status: Single    Number of children: 2    Highest education level: 11th grade   Occupational History    Occupation: Retired   Tobacco Use    Smoking status: Never    Smokeless tobacco: Never   Substance and Sexual Activity    Alcohol use: Never     Alcohol/week: 0.0 standard drinks    Drug use: No    Sexual activity: Not Currently     Partners: Male     Birth control/protection: See Surgical Hx   Other Topics Concern    Are you pregnant or think you may be? No    Breast-feeding No   Social History Narrative    Single. 2 children , 1  at 30 yoa   strep throat -  pneumonia and renal complications after not completing course of AB. Other child lives in [place], [place]. Has a cousin locally that could help in an emergency. 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 Caffeine intake =- 1 cola a day. No coffee, + occasional tea, avoids caffeine especially at night. Still drives. She does not have a Living Will or Advanced directive.      Patient Active Problem List   Diagnosis    Heart transplanted    Anxiety    Insomnia    CKD (chronic kidney disease) stage 4, GFR 15-29 ml/min    Immunosuppression due to drug therapy    Fibromyalgia    Gastroesophageal reflux disease without esophagitis    Breast screening    Immunization deficiency    Essential hypertension    Aortic atherosclerosis    Chronic major depressive disorder, recurrent episode    Iron deficiency anemia    Vaginal atrophy    Hyperparathyroidism    Hx of falling    Chronic midline low back pain without sciatica    Lightheadedness    Medication side effects    Obesity (BMI 30.0-34.9)    Edema    Asymptomatic menopausal state      Other osteoporosis without current pathological fracture    Cough    Abnormal CT scan, kidney    Weakness of both lower extremities    Immunocompromised patient    Osteoporosis, post-menopausal    Ductal carcinoma in situ (DCIS) of left breast    Immunodeficiency secondary to radiation therapy    The hangs, uncomplicated    GRACE (acute kidney injury)    Diarrhea    Thrombocytopenia, unspecified    Immunodeficiency due to chemotherapy    Strain of left shoulder    Left shoulder pain    Ulnar neuropathy of left upper extremity    Anemia associated with stage 4 chronic renal failure    Secondary and unspecified malignant neoplasm of axilla and upper limb lymph nodes    Other chest pain    Abnormal thyroid function test    Hypomagnesemia    Slow transit constipation    Other hyperlipidemia    Sacroiliac joint pain    Impaired mobility    Cyst of right breast    Leukopenia     Review of patient's allergies indicates:   Allergen Reactions    Lisinopril Swelling and Rash    Augmentin [amoxicillin-pot clavulanate] Diarrhea       Medications:  Current Outpatient Medications on File Prior to Visit   Medication Sig Dispense Refill    aspirin (ECOTRIN) 81 MG EC tablet Take 1 tablet (81 mg total) by mouth once daily. 90 tablet 3    biotin 10,000 mcg Cap Take 1 tablet by mouth once daily.      busPIRone (BUSPAR) 10 MG tablet Take 1 tablet (10 mg total) by mouth once daily. 90 tablet 3    carvediloL (COREG) 6.25 MG tablet Take 1 tablet (6.25 mg total) by mouth 2 (two) times daily with meals. 180 tablet 3    cycloSPORINE modified, NEORAL, (NEORAL) 25 MG capsule TAKE 3 CAPSULES (75 MG TOTAL) BY MOUTH 2 (TWO) TIMES DAILY. 540 capsule 6    DULoxetine (CYMBALTA) 30 MG capsule TAKE 1 CAPSULE EVERY DAY 90 capsule 1    EVENING PRIMROSE OIL ORAL Take 1,000 mg by mouth once daily.      furosemide (LASIX) 20 MG tablet Take 1 tablet (20 mg total) by mouth 2 (two) times a day for 5 days, THEN 1 tablet (20 mg total) once daily. Take 1 tablet  daily. 370 tablet 0    hydrALAZINE (APRESOLINE) 50 MG tablet Take 1 tablet (50 mg total) by mouth every 8 (eight) hours. TAKE 1 TABLETS  EVERY 8  HOURS. 270 tablet 3    multivitamin capsule Take 1 capsule by mouth once daily.      pantoprazole (PROTONIX) 40 MG tablet TAKE 1 TABLET EVERY DAY 90 tablet 1    pregabalin (LYRICA) 50 MG capsule TAKE 1 CAPSULE 2 TIMES DAILY AT NOON AND NIGHT TIME 180 capsule 1    RETACRIT 20,000 unit/mL injection       temazepam (RESTORIL) 30 mg capsule TAKE 1 CAPSULE AT BEDTIME 30 capsule 5    tiZANidine (ZANAFLEX) 4 MG tablet Take 1 tablet (4 mg total) by mouth 2 (two) times daily as needed. 180 tablet 0    vitamin E 400 UNIT capsule Take 400 Units by mouth once daily.      zolpidem (AMBIEN) 10 mg Tab TAKE 1 TABLET BY MOUTH ONCE DAILY IN THE EVENING 90 tablet 1     Current Facility-Administered Medications on File Prior to Visit   Medication Dose Route Frequency Provider Last Rate Last Admin    denosumab (PROLIA) injection 60 mg  60 mg Subcutaneous Q6 Months Prasanth Johnson MD        lactated ringers infusion   Intravenous Continuous Jennifer Carr MD 10 mL/hr at 11/09/21 0717 Restarted at 11/09/21 0820       Medications have been reviewed and reconciled with patient at visit today.    Barriers to medications present (no )    Fall since last office visit (no )      Exam:  Vitals:    11/11/22 1417   BP: 124/72   Pulse: 86   Temp: 98.3 °F (36.8 °C)     Weight: 77.3 kg (170 lb 8 oz)   Body mass index is 31.18 kg/m².      BP Readings from Last 3 Encounters:   11/16/22 138/88   11/11/22 124/72   11/09/22 122/71     Wt Readings from Last 3 Encounters:   11/16/22 1047 78.2 kg (172 lb 8 oz)   11/11/22 1417 77.3 kg (170 lb 8 oz)   11/09/22 0744 77.6 kg (171 lb 1.2 oz)            Physical Exam  Constitutional:       General: She is not in acute distress.  HENT:      Head: Normocephalic.      Right Ear: Tympanic membrane normal.      Left Ear: Tympanic membrane normal.      Nose: Nose normal.       Mouth/Throat:      Mouth: Mucous membranes are moist.      Pharynx: No oropharyngeal exudate or posterior oropharyngeal erythema.   Eyes:      General: No scleral icterus.  Cardiovascular:      Rate and Rhythm: Normal rate and regular rhythm.   Pulmonary:      Effort: Pulmonary effort is normal.      Breath sounds: Normal breath sounds.   Abdominal:      General: There is no distension.      Palpations: Abdomen is soft.      Tenderness: There is no abdominal tenderness.   Musculoskeletal:         General: Swelling (1+ BLE) present. Normal range of motion.      Cervical back: Neck supple.   Lymphadenopathy:      Cervical: No cervical adenopathy.   Skin:     General: Skin is warm and dry.   Neurological:      Mental Status: She is alert. Mental status is at baseline.      Gait: Gait normal.   Psychiatric:         Mood and Affect: Mood normal.         Behavior: Behavior normal.       Laboratory Reviewed: (Yes)  Lab Results   Component Value Date    WBC 3.42 (L) 11/03/2022    HGB 10.0 (L) 11/03/2022    HCT 31.3 (L) 11/03/2022     11/03/2022    CHOL 193 10/04/2022    TRIG 159 (H) 10/04/2022    HDL 46 10/04/2022    ALT 17 11/03/2022    AST 27 11/03/2022     11/11/2022    K 5.0 11/11/2022     11/11/2022    CREATININE 2.9 (H) 11/11/2022    BUN 41 (H) 11/11/2022    CO2 24 11/11/2022    TSH 5.231 (H) 06/14/2022    PSA <0.1 05/27/2008    INR 1.0 11/22/2021    HGBA1C 5.2 12/24/2021           Health Maintenance  Health Maintenance Topics with due status: Not Due       Topic Last Completion Date    Pneumococcal Vaccines (Age 65+) 10/22/2018    TETANUS VACCINE 09/09/2020    Colorectal Cancer Screening 02/25/2021    DEXA Scan 08/03/2021    Lipid Panel 10/04/2022    Mammogram 10/31/2022     Health Maintenance Due   Topic Date Due    COVID-19 Vaccine (3 - Moderna risk series) 08/10/2022       Assessment:  Problem List Items Addressed This Visit          Cardiac/Vascular    Essential hypertension     Now controlled.    Continue POC, furosemide, carvedilol, amlodipine.             Immunology/Multi System    Immunosuppression due to drug therapy     No sx of infection.   Flu shot today.            Orthopedic    Other osteoporosis without current pathological fracture - Primary         Plan:  Other osteoporosis without current pathological fracture  -     Discontinue: calcitonin, salmon, (FORTICAL) 200 unit/actuation nasal spray; 1 spray by Nasal route once daily.  Dispense: 3 each; Refill: 3    Essential hypertension    Immunosuppression due to drug therapy    Other orders  -     Influenza - Quadrivalent (Adjuvanted)    -Patient's lab results were reviewed and discussed with patient  -Treatment options and alternatives were discussed with the patient. Patient expressed understanding. Patient was given the opportunity to ask questions and be an active participant in their medical care. Patient had no further questions or concerns at this time.   -Documentation of patient's health and condition was obtained from family member who was present during visit.  -Patient is an overall moderate risk for health complications from their medical conditions.       Follow up: Follow up in about 3 months (around 2/11/2023).      After visit summary printed and given to patient upon discharge.  Patient goals and care plan are included in After visit summary.    Total medical decision making time was 54 min.  The following issues were discussed: The primary encounter diagnosis was Other osteoporosis without current pathological fracture. Diagnoses of Essential hypertension and Immunosuppression due to drug therapy were also pertinent to this visit.    Health maintenance needs, recent test results and goals of care discussed with pt and questions answered.

## 2022-11-11 NOTE — PROGRESS NOTES
Outpatient Care Management  Plan of Care Follow Up Visit    Patient: Nadia Damon  MRN: [id number]  Date of Service: 10/27/2022  Completed by: Geri Saha RN  Referral Date: 09/02/2022    Reason for Visit   Patient presents with    OPCM RN First Follow-Up Attempt    OPCM RN Second Follow-Up Attempt    OPCM Chart Review    Update Plan Of Care       Brief Summary: Phone contact with Mrs Zuniga today and hyperkalemia diet restrictions were discussed and she voiced good comprehension of and compliance with low K+ diet recommendations and foods to avoid. She agreed to follow up contact from this CM again in about 4 weeks following family visit in [place] for Thanksgiving then second scheduled nerve block procedure on 12/8/22 to review signs and symptoms of hyperkalemia.     Patient Summary     Involvement of Care:  Do I have permission to speak with other family members about your care?       Patient Reported Labs & Vitals:  1.  Any Patient Reported Labs & Vitals?     2.  Patient Reported Blood Pressure:     3.  Patient Reported Pulse:     4.  Patient Reported Weight (Kg):     5.  Patient Reported Blood Glucose (mg/dl):       Medical and social history was reviewed with patient and/or caregiver.     Clinical Assessment     Reviewed and provided basic information on available community resources for mental health, transportation, wellness resources, and palliative care programs with patient and/or caregiver.     Complex Care Plan     Care plan was discussed and completed today with input from patient and/or caregiver.    Patient Instructions     Instructions were provided via the [facility] patient resources and are available for the patient to view on the patient portal.    Next Steps: Discuss symptoms of hyperkalemia. Geri Saha RN    Follow up in about 7 weeks (around 12/15/2022) for address care plan tasks.    Todays OPCM Self-Management Care Plan was developed with the patients/caregivers input and was based  on identified barriers from todays assessment.  Goals were written today with the patient/caregiver and the patient has agreed to work towards these goals to improve his/her overall well-being. Patient verbalized understanding of the care plan, goals, and all of today's instructions. Encouraged patient/caregiver to communicate with his/her physician and health care team about health conditions and the treatment plan.  Provided my contact information today and encouraged patient/caregiver to call me with any questions as needed.11/11/22 - Mrs Zuniga is scheduled for follow up contact today, but noted on chart review she has an appt with her provider at this time. Will reschedule contact for next week. Geri Saha RN

## 2022-11-12 LAB — PTH-INTACT SERPL-MCNC: 507.8 PG/ML (ref 9–77)

## 2022-11-16 ENCOUNTER — OFFICE VISIT (OUTPATIENT)
Dept: NEPHROLOGY | Facility: CLINIC | Age: 67
End: 2022-11-16
Payer: MEDICARE

## 2022-11-16 VITALS
WEIGHT: 172.5 LBS | HEIGHT: 62 IN | DIASTOLIC BLOOD PRESSURE: 88 MMHG | SYSTOLIC BLOOD PRESSURE: 138 MMHG | BODY MASS INDEX: 31.74 KG/M2 | HEART RATE: 93 BPM | RESPIRATION RATE: 20 BRPM

## 2022-11-16 DIAGNOSIS — N18.4 STAGE 4 CHRONIC KIDNEY DISEASE: Primary | ICD-10-CM

## 2022-11-16 PROCEDURE — 3066F NEPHROPATHY DOC TX: CPT | Mod: CPTII,S$GLB,, | Performed by: INTERNAL MEDICINE

## 2022-11-16 PROCEDURE — 1159F PR MEDICATION LIST DOCUMENTED IN MEDICAL RECORD: ICD-10-PCS | Mod: CPTII,S$GLB,, | Performed by: INTERNAL MEDICINE

## 2022-11-16 PROCEDURE — 1157F ADVNC CARE PLAN IN RCRD: CPT | Mod: CPTII,S$GLB,, | Performed by: INTERNAL MEDICINE

## 2022-11-16 PROCEDURE — 99215 OFFICE O/P EST HI 40 MIN: CPT | Mod: S$GLB,,, | Performed by: INTERNAL MEDICINE

## 2022-11-16 PROCEDURE — 3075F PR MOST RECENT SYSTOLIC BLOOD PRESS GE 130-139MM HG: ICD-10-PCS | Mod: CPTII,S$GLB,, | Performed by: INTERNAL MEDICINE

## 2022-11-16 PROCEDURE — 1101F PR PT FALLS ASSESS DOC 0-1 FALLS W/OUT INJ PAST YR: ICD-10-PCS | Mod: CPTII,S$GLB,, | Performed by: INTERNAL MEDICINE

## 2022-11-16 PROCEDURE — 1125F AMNT PAIN NOTED PAIN PRSNT: CPT | Mod: CPTII,S$GLB,, | Performed by: INTERNAL MEDICINE

## 2022-11-16 PROCEDURE — 3008F PR BODY MASS INDEX (BMI) DOCUMENTED: ICD-10-PCS | Mod: CPTII,S$GLB,, | Performed by: INTERNAL MEDICINE

## 2022-11-16 PROCEDURE — 99999 PR PBB SHADOW E&M-EST. PATIENT-LVL IV: ICD-10-PCS | Mod: PBBFAC,,, | Performed by: INTERNAL MEDICINE

## 2022-11-16 PROCEDURE — 3008F BODY MASS INDEX DOCD: CPT | Mod: CPTII,S$GLB,, | Performed by: INTERNAL MEDICINE

## 2022-11-16 PROCEDURE — 1157F PR ADVANCE CARE PLAN OR EQUIV PRESENT IN MEDICAL RECORD: ICD-10-PCS | Mod: CPTII,S$GLB,, | Performed by: INTERNAL MEDICINE

## 2022-11-16 PROCEDURE — 4010F ACE/ARB THERAPY RXD/TAKEN: CPT | Mod: CPTII,S$GLB,, | Performed by: INTERNAL MEDICINE

## 2022-11-16 PROCEDURE — 1159F MED LIST DOCD IN RCRD: CPT | Mod: CPTII,S$GLB,, | Performed by: INTERNAL MEDICINE

## 2022-11-16 PROCEDURE — 99215 PR OFFICE/OUTPT VISIT, EST, LEVL V, 40-54 MIN: ICD-10-PCS | Mod: S$GLB,,, | Performed by: INTERNAL MEDICINE

## 2022-11-16 PROCEDURE — 4010F PR ACE/ARB THEARPY RXD/TAKEN: ICD-10-PCS | Mod: CPTII,S$GLB,, | Performed by: INTERNAL MEDICINE

## 2022-11-16 PROCEDURE — 3066F PR DOCUMENTATION OF TREATMENT FOR NEPHROPATHY: ICD-10-PCS | Mod: CPTII,S$GLB,, | Performed by: INTERNAL MEDICINE

## 2022-11-16 PROCEDURE — 3288F FALL RISK ASSESSMENT DOCD: CPT | Mod: CPTII,S$GLB,, | Performed by: INTERNAL MEDICINE

## 2022-11-16 PROCEDURE — 3075F SYST BP GE 130 - 139MM HG: CPT | Mod: CPTII,S$GLB,, | Performed by: INTERNAL MEDICINE

## 2022-11-16 PROCEDURE — 1125F PR PAIN SEVERITY QUANTIFIED, PAIN PRESENT: ICD-10-PCS | Mod: CPTII,S$GLB,, | Performed by: INTERNAL MEDICINE

## 2022-11-16 PROCEDURE — 3079F PR MOST RECENT DIASTOLIC BLOOD PRESSURE 80-89 MM HG: ICD-10-PCS | Mod: CPTII,S$GLB,, | Performed by: INTERNAL MEDICINE

## 2022-11-16 PROCEDURE — 3079F DIAST BP 80-89 MM HG: CPT | Mod: CPTII,S$GLB,, | Performed by: INTERNAL MEDICINE

## 2022-11-16 PROCEDURE — 99999 PR PBB SHADOW E&M-EST. PATIENT-LVL IV: CPT | Mod: PBBFAC,,, | Performed by: INTERNAL MEDICINE

## 2022-11-16 PROCEDURE — 3288F PR FALLS RISK ASSESSMENT DOCUMENTED: ICD-10-PCS | Mod: CPTII,S$GLB,, | Performed by: INTERNAL MEDICINE

## 2022-11-16 PROCEDURE — 1101F PT FALLS ASSESS-DOCD LE1/YR: CPT | Mod: CPTII,S$GLB,, | Performed by: INTERNAL MEDICINE

## 2022-11-16 NOTE — PROGRESS NOTES
NEPHROLOGY CLINIC FOLLOWUP NOTE  Date of clinic visit: 11/16/22     REASON FOR FOLLOWUP AND CHIEF COMPLAINT: nephrotic syndrome, CKD post heart transplant, HTN, nephrolithiasis, hypercalcemia     HISTORY OF PRESENT ILLNESS: Ms. Damon is a 67-year-old female with a history of CKD stage 4, nephrolithiasis (likely mixed stones), and heart transplant in 1993 (is on cyclosporine), who presents for f/u. Pt was last seen by us in Jan 2022. 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 Pt also has breast cancer (she did have persistent mild hypercalcemia in the past). Hem/onc notes were reviewed. 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 Pt has had further complication, requiring hospital admission including pancytopenia, neutropenic fever, axillary cellulitis, and C diff colitis.      On f/u today, pt reports no new c/o's, no SOB, does have leg swelling, no fever, no abd pain.     To review: Past mild, persistent hypercalcemia reviewed. Previously suspected related to primary hyperparathyroidism (HPT) is suspected. Sestamibi nuclear scan of the neck did not show any adenomas. Her risk factors for kidney stones include hypercalcemia and being on cyclosporine for prevention of heart transplant rejection, which causes hyperuricemia, and prior intake of vitamin C.. Pt has a h/o of osteopenia.             PAST MEDICAL HISTORY:  1. CKD stage IV. Nephrotic syndrome. Due to chronic calcineurin inhibitor toxicity due to taking cyclosporine, kidney biopsy was done on 11/10/21  2. Hypertension.  3. Heart transplant for cardiomyopathy in 1993, the patient has been on chronic  cyclosporine treatment.  4. Carpal tunnel syndrome.  5. Fibromyalgia.  6. GERD.  7. Bell's palsy.  8. Depression.     PAST SURGICAL HISTORY: Reviewed as above,  unchanged.     MEDICATIONS: Reviewed   Meds reviewed    Current Outpatient Medications:     amLODIPine (NORVASC) 2.5 MG tablet, Take 1 tablet (2.5 mg total) by mouth once daily., Disp: 90 tablet, Rfl: 3    aspirin (ECOTRIN) 81 MG EC tablet, Take 1 tablet (81 mg total) by mouth once daily., Disp: 90 tablet, Rfl: 3    biotin 10,000 mcg Cap, Take 1 tablet by mouth once daily., Disp: , Rfl:     busPIRone (BUSPAR) 10 MG tablet, Take 1 tablet (10 mg total) by mouth once daily., Disp: 90 tablet, Rfl: 3    calcitonin, salmon, (FORTICAL) 200 unit/actuation nasal spray, 1 spray by Nasal route once daily., Disp: 3 each, Rfl: 3    carvediloL (COREG) 6.25 MG tablet, Take 1 tablet (6.25 mg total) by mouth 2 (two) times daily with meals., Disp: 180 tablet, Rfl: 3    cycloSPORINE modified, NEORAL, (NEORAL) 25 MG capsule, TAKE 3 CAPSULES (75 MG TOTAL) BY MOUTH 2 (TWO) TIMES DAILY., Disp: 540 capsule, Rfl: 6    DULoxetine (CYMBALTA) 30 MG capsule, TAKE 1 CAPSULE EVERY DAY, Disp: 90 capsule, Rfl: 1    EVENING PRIMROSE OIL ORAL, Take 1,000 mg by mouth once daily., Disp: , Rfl:     furosemide (LASIX) 20 MG tablet, Take 1 tablet (20 mg total) by mouth 2 (two) times a day for 5 days, THEN 1 tablet (20 mg total) once daily. Take 1 tablet daily., Disp: 370 tablet, Rfl: 0    hydrALAZINE (APRESOLINE) 50 MG tablet, Take 1 tablet (50 mg total) by mouth every 8 (eight) hours. TAKE 1 TABLETS  EVERY 8  HOURS., Disp: 270 tablet, Rfl: 3    multivitamin capsule, Take 1 capsule by mouth once daily., Disp: , Rfl:     pantoprazole (PROTONIX) 40 MG tablet, TAKE 1 TABLET EVERY DAY, Disp: 90 tablet, Rfl: 1    pregabalin (LYRICA) 50 MG capsule, TAKE 1 CAPSULE 2 TIMES DAILY AT NOON AND NIGHT TIME, Disp: 180 capsule, Rfl: 1    RETACRIT 20,000 unit/mL injection, , Disp: , Rfl:     temazepam (RESTORIL) 30 mg capsule, TAKE 1 CAPSULE AT BEDTIME, Disp: 30 capsule, Rfl: 5    tiZANidine (ZANAFLEX) 4 MG tablet, Take 1 tablet (4 mg total) by mouth 2 (two) times daily  as needed., Disp: 180 tablet, Rfl: 0    vitamin E 400 UNIT capsule, Take 400 Units by mouth once daily., Disp: , Rfl:     zolpidem (AMBIEN) 10 mg Tab, TAKE 1 TABLET BY MOUTH ONCE DAILY IN THE EVENING, Disp: 90 tablet, Rfl: 1    Current Facility-Administered Medications:     denosumab (PROLIA) injection 60 mg, 60 mg, Subcutaneous, Q6 Months, Prasanth Johnson MD    Facility-Administered Medications Ordered in Other Visits:     lactated ringers infusion, , Intravenous, Continuous, Jennifer Carr MD, Last Rate: 10 mL/hr at 11/09/21 0717, Restarted at 11/09/21 0820      SOCIAL HISTORY: Reviewed. Negative for smoking. No alcohol use.      REVIEW OF SYSTEMS: No recent hospitalizations.  GENERAL: Negative.  HEAD, EYES, EARS, NOSE, AND THROAT: Negative.  CARDIAC: Negative.  PULMONARY: Negative.  GASTROINTESTINAL: Negative.  GENITOURINARY: Negative.  PSYCHOLOGICAL: Negative.  NEUROLOGICAL: Negative.  ENDOCRINE: Negative.  HEMATOLOGIC AND ONCOLOGIC: Negative.  INFECTIOUS DISEASE: Negative.  The rest of the review of systems negative.     PHYSICAL EXAMINATION:  VITAL SIGNS: Repeat blood pressure is 138/88, Pulse is 93, weight is 78.2 Kg, from 74.8 Kg,   GENERAL: She is cooperative, pleasant, in no acute distress.   Speech and thought process appropriate, normal.  HEENT: Mucous membranes moist.  NECK: Neck JVD. Normal hearing.  ABDOMEN: Soft, nontender.  EXTREMITIES: trace pitting edema.     LABS: Reviewed.    BMP  Lab Results   Component Value Date     11/11/2022    K 5.0 11/11/2022     11/11/2022    CO2 24 11/11/2022    BUN 41 (H) 11/11/2022    CREATININE 2.9 (H) 11/11/2022    CALCIUM 9.0 11/11/2022    ANIONGAP 11 11/11/2022    EGFRNORACEVR 17.1 (A) 11/11/2022     Lab Results   Component Value Date    WBC 3.42 (L) 11/03/2022    HGB 10.0 (L) 11/03/2022    HCT 31.3 (L) 11/03/2022    MCV 88 11/03/2022     11/03/2022       Lab Results   Component Value Date    .8 (H) 11/11/2022    CALCIUM 9.0 11/11/2022     CAION 1.13 09/05/2018    PHOS 4.3 11/11/2022             Urine protein/cr 0.32 g, from 0.49 g, from 2.2 g, 6.0 g  U/a: no protein (from 3+), no blood, 4 RBC's, no casts     Complements C3 and C4 both normal     To review older labs:   24 hour urine: Ca not measured, uric acid 138, Oxalate 20, citrate 203  U/a unremarkable  Urine Cx: neg   Urine P/Cr 740 mg     KUB: unremarkable, except for some constipation     Renal u/s: FINDINGS: 10/22/20  Kidneys measure 10.3 and 9.7 cm in length on the right left respectively.  Cortex is smoothly marginated well maintained with mild diffuse increased echotexture.  Appearance suggest medical renal disease.  Two simple cyst identified within the right kidney.  Upper pole cyst 1.4 cm maximum diameter and inferior pole cyst 2.2 cm maximum diameter.  There is 9 mm simple cyst within the left inferior pole.  Resistive indices are 0.66 and 0.76 on the right left respectively.  Urinary bladder partially distended without focal or diffuse wall thickening.     CT abd: The left kidney appears slightly small.  Right kidney is grossly normal in size.  No obvious hydronephrosis or nephrolithiasis     Sestamibi nuclear scan of the neck: 8/29/19: no adenomas        Kidney biopsy from 11/10/21:                   ASSESSMENT AND PLAN: This is a 67-year-old female who presents for CKD follow up:  Patient has a h/o of heart transplant  The impression is as follows:     1. Renal: s Cr overall stable, slowly progressive CKD  Stable renal function. Stage 4 CKD  Nephrotic syndrome: good response to losartan: proteinuria further improved from 6 to 2.2 g, and now to < 1 g  BP controlled     Complications of CKD:  K is normal to borderline elevated. Continue lasix.  Na normal  Acid base stable  Ca normal  PO4 normal  Secondary hyperparathyroidism: moderate. Will hold treatment with calcitriol, because in the past had hypercalcemia (and past h/o of kidney stones), and is currently on calcitonin to keep ca  lower  Anemia:mild, multifactorial, seeing hem/onc     Kidney biopsy showed:  Damage by HTN (hypertensive nephrosclerosis) as well as calcineurin inhibitor toxicity due to taking cyclosporine to prevent heart transplant rejection. There was 50% chronic interstitial fibrosis.  Pt has been advised of the kidney biopsy in layman's language  She was specifically advised NOT to stop cyclosporine of change the dose on her own.     2. HTN: BP controlled  Reviewed meds with pt     3. Nephrolithiasis: no new stones. To review:  Has multiple risk factors for kidney stones: (hyperuricemia from cyclosporine, diet rich in uric acid, previously taking Vit C, vit D, and calcium,, CKD, and having primary hyperparathyroidism)  Stones are likely mixed ca oxalate and uric acid kidney stones  No longer taking Vit C  No longer takes Vit D and Ca.   Mild hyperuricemia, from cyclosporine.  F/u CT did not show any stones  U/s showed non-obstructive 2 R stones, relatively large  May have passed the stones  24 hour urine for kidney stone stratification shows hypocitraturia  Pt was advised NOT TO STOP cyclosporine.     4. History of heart transplant on cyclosporine  Per heart transplant team.  Do NOT stop cyclosporine      5. Elevated iPTH, likely has primary hyperparathyroidism (HPT)  No adenomas found on sestamibi nulcear scan  May have diffuse hyperplasia  Primary HPT can explain increased risk of kidney stones     6. New diagnosis of breast cancer: since her last visit with us (she did have persistent mild hypercalcemia in the past). Hem/onc notes were reviewed. 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 Pt has had further complication, requiring hospital admission including pancytopenia, neutropenic fever, axillary cellulitis, and C diff colitis.  Will defer to hem/onc     7. Anemia: normocytic. Likely multifactorial: due to CKD and  cancer.  Advised pt to have PRBC transfusion. Last transfusion was in Nov 2021. Pt declined  Has f/u with hem/onc on 1/20/21. Please evaluate for epogen and IV iron therapy.              PLANS AND RECOMMENDATIONS:   As discussed above  Opportunity for questions provided  Complicated case, multiple issues addressed  RTC 6 months  Total time spent 40 minutes. Extensive time spent including the time to review the records, the patient evaluation, documentation, face-to-face  discussion with the patient. More than 50% of the time was spent in counseling  and coordination of care. Level V visit.     Carter Crawford MD

## 2022-11-17 RX ORDER — CALCITONIN SALMON 200 [IU]/.09ML
1 SPRAY, METERED NASAL DAILY
Qty: 3 EACH | Refills: 3 | Status: SHIPPED | OUTPATIENT
Start: 2022-11-17 | End: 2023-01-04 | Stop reason: SDUPTHER

## 2022-11-28 ENCOUNTER — HOSPITAL ENCOUNTER (OUTPATIENT)
Dept: RADIOLOGY | Facility: HOSPITAL | Age: 67
Discharge: HOME | End: 2022-11-28
Attending: SURGERY
Payer: MEDICARE

## 2022-11-28 DIAGNOSIS — D05.12 DUCTAL CARCINOMA IN SITU (DCIS) OF LEFT BREAST: ICD-10-CM

## 2022-11-28 PROCEDURE — 76642 ULTRASOUND BREAST LIMITED: CPT | Mod: 26,LT,, | Performed by: RADIOLOGY

## 2022-11-28 PROCEDURE — 77065 MAMMO DIGITAL DIAGNOSTIC LEFT WITH TOMO: ICD-10-PCS | Mod: 26,LT,, | Performed by: RADIOLOGY

## 2022-11-28 PROCEDURE — 76642 US BREAST LEFT LIMITED: ICD-10-PCS | Mod: 26,LT,, | Performed by: RADIOLOGY

## 2022-11-28 PROCEDURE — 77065 DX MAMMO INCL CAD UNI: CPT | Mod: 26,LT,, | Performed by: RADIOLOGY

## 2022-11-28 PROCEDURE — 76642 ULTRASOUND BREAST LIMITED: CPT | Mod: TC,LT

## 2022-11-28 PROCEDURE — 77065 DX MAMMO INCL CAD UNI: CPT | Mod: TC,LT

## 2022-11-28 PROCEDURE — 77061 BREAST TOMOSYNTHESIS UNI: CPT | Mod: 26,LT,, | Performed by: RADIOLOGY

## 2022-11-28 PROCEDURE — 77061 MAMMO DIGITAL DIAGNOSTIC LEFT WITH TOMO: ICD-10-PCS | Mod: 26,LT,, | Performed by: RADIOLOGY

## 2022-12-01 ENCOUNTER — OFFICE VISIT (OUTPATIENT)
Dept: RADIATION ONCOLOGY | Facility: CLINIC | Age: 67
End: 2022-12-01
Payer: MEDICARE

## 2022-12-01 ENCOUNTER — HOSPITAL ENCOUNTER (OUTPATIENT)
Dept: RADIOLOGY | Facility: HOSPITAL | Age: 67
Discharge: HOME | End: 2022-12-01
Attending: ANESTHESIOLOGY
Payer: MEDICARE

## 2022-12-01 ENCOUNTER — PATIENT MESSAGE (OUTPATIENT)
Dept: PAIN MEDICINE | Facility: CLINIC | Age: 67
End: 2022-12-01
Payer: MEDICARE

## 2022-12-01 VITALS
SYSTOLIC BLOOD PRESSURE: 151 MMHG | HEIGHT: 62 IN | HEART RATE: 103 BPM | DIASTOLIC BLOOD PRESSURE: 90 MMHG | WEIGHT: 170.88 LBS | RESPIRATION RATE: 18 BRPM | BODY MASS INDEX: 31.45 KG/M2 | TEMPERATURE: 98 F | OXYGEN SATURATION: 98 %

## 2022-12-01 DIAGNOSIS — D05.12 DUCTAL CARCINOMA IN SITU (DCIS) OF LEFT BREAST: Primary | ICD-10-CM

## 2022-12-01 DIAGNOSIS — M47.816 LUMBAR SPONDYLOSIS: Primary | ICD-10-CM

## 2022-12-01 DIAGNOSIS — M47.816 LUMBAR SPONDYLOSIS: ICD-10-CM

## 2022-12-01 PROCEDURE — 99999 PR PBB SHADOW E&M-EST. PATIENT-LVL V: CPT | Mod: PBBFAC,,, | Performed by: RADIOLOGY

## 2022-12-01 PROCEDURE — 1159F MED LIST DOCD IN RCRD: CPT | Mod: CPTII,S$GLB,, | Performed by: RADIOLOGY

## 2022-12-01 PROCEDURE — 1159F PR MEDICATION LIST DOCUMENTED IN MEDICAL RECORD: ICD-10-PCS | Mod: CPTII,S$GLB,, | Performed by: RADIOLOGY

## 2022-12-01 PROCEDURE — 3077F SYST BP >= 140 MM HG: CPT | Mod: CPTII,S$GLB,, | Performed by: RADIOLOGY

## 2022-12-01 PROCEDURE — 3008F BODY MASS INDEX DOCD: CPT | Mod: CPTII,S$GLB,, | Performed by: RADIOLOGY

## 2022-12-01 PROCEDURE — 72100 XR LUMBAR SPINE 2 OR 3 VIEWS: ICD-10-PCS | Mod: 26,,, | Performed by: RADIOLOGY

## 2022-12-01 PROCEDURE — 4010F PR ACE/ARB THEARPY RXD/TAKEN: ICD-10-PCS | Mod: CPTII,S$GLB,, | Performed by: RADIOLOGY

## 2022-12-01 PROCEDURE — 3080F PR MOST RECENT DIASTOLIC BLOOD PRESSURE >= 90 MM HG: ICD-10-PCS | Mod: CPTII,S$GLB,, | Performed by: RADIOLOGY

## 2022-12-01 PROCEDURE — 1157F ADVNC CARE PLAN IN RCRD: CPT | Mod: CPTII,S$GLB,, | Performed by: RADIOLOGY

## 2022-12-01 PROCEDURE — 99999 PR PBB SHADOW E&M-EST. PATIENT-LVL V: ICD-10-PCS | Mod: PBBFAC,,, | Performed by: RADIOLOGY

## 2022-12-01 PROCEDURE — 3008F PR BODY MASS INDEX (BMI) DOCUMENTED: ICD-10-PCS | Mod: CPTII,S$GLB,, | Performed by: RADIOLOGY

## 2022-12-01 PROCEDURE — 1125F PR PAIN SEVERITY QUANTIFIED, PAIN PRESENT: ICD-10-PCS | Mod: CPTII,S$GLB,, | Performed by: RADIOLOGY

## 2022-12-01 PROCEDURE — 3066F PR DOCUMENTATION OF TREATMENT FOR NEPHROPATHY: ICD-10-PCS | Mod: CPTII,S$GLB,, | Performed by: RADIOLOGY

## 2022-12-01 PROCEDURE — 72100 X-RAY EXAM L-S SPINE 2/3 VWS: CPT | Mod: TC

## 2022-12-01 PROCEDURE — 1157F PR ADVANCE CARE PLAN OR EQUIV PRESENT IN MEDICAL RECORD: ICD-10-PCS | Mod: CPTII,S$GLB,, | Performed by: RADIOLOGY

## 2022-12-01 PROCEDURE — 99213 OFFICE O/P EST LOW 20 MIN: CPT | Mod: S$GLB,,, | Performed by: RADIOLOGY

## 2022-12-01 PROCEDURE — 72100 X-RAY EXAM L-S SPINE 2/3 VWS: CPT | Mod: 26,,, | Performed by: RADIOLOGY

## 2022-12-01 PROCEDURE — 3080F DIAST BP >= 90 MM HG: CPT | Mod: CPTII,S$GLB,, | Performed by: RADIOLOGY

## 2022-12-01 PROCEDURE — 1125F AMNT PAIN NOTED PAIN PRSNT: CPT | Mod: CPTII,S$GLB,, | Performed by: RADIOLOGY

## 2022-12-01 PROCEDURE — 99213 PR OFFICE/OUTPT VISIT, EST, LEVL III, 20-29 MIN: ICD-10-PCS | Mod: S$GLB,,, | Performed by: RADIOLOGY

## 2022-12-01 PROCEDURE — 4010F ACE/ARB THERAPY RXD/TAKEN: CPT | Mod: CPTII,S$GLB,, | Performed by: RADIOLOGY

## 2022-12-01 PROCEDURE — 3066F NEPHROPATHY DOC TX: CPT | Mod: CPTII,S$GLB,, | Performed by: RADIOLOGY

## 2022-12-01 PROCEDURE — 3077F PR MOST RECENT SYSTOLIC BLOOD PRESSURE >= 140 MM HG: ICD-10-PCS | Mod: CPTII,S$GLB,, | Performed by: RADIOLOGY

## 2022-12-01 NOTE — PRE-PROCEDURE INSTRUCTIONS
Spoke with patient regarding procedure scheduled on 12.7    Arrival time 0900    Has patient been sick with fever or on antibiotics within the last 7 days? No    Does the patient have any open wounds, sores or rashes? No    Does the patient have any recent fractures? no    Has patient received a vaccination within the last 7 days? No    Received the COVID vaccination? yes    Has the patient stopped all medications as directed? NA    Does patient have a pacemaker and or defibrillator? no    Does the patient have a ride to and from procedure and someone reliable to remain with patient? agustin    Is the patient diabetic? no    Does the patient have sleep apnea? Or use O2 at home? No and no     Is the patient receiving sedation? yes    Is the patient instructed to remain NPO beginning at midnight the night before their procedure? yes    Procedure location confirmed with patient? Yes

## 2022-12-01 NOTE — PROGRESS NOTES
"[facility] [place]  RADIATION ONCOLOGY FOLLOW UP    Name: Nadia Damon : 1955     DIAGNOSIS: L breast microinvasive carcinoma, pTmi(m) N1mi(sn) cM0, ER/CT negative, high grade     TREATMENT HISTORY:   1. L lumpectomy 9/10/21  2. L sentinel node biopsy 10/12/21  3. Attempted chemotherapy  4. 42.56Gy/16fx to L breast and axilla completed 22    INTERVAL HISTORY: Nadia Damon is a 67 y.o. female who presents today for follow-up.  Last seen 6mo ago    Had R breast biopsy done 10/31/22 negative  MMG 22 showed scarring, BIRADS 3 prob benign rec 6mo follow up    Today, notes continued reduced ROM of LUE and tightness of pec major muscle  NO breast or LUE edema  No skin problems    PHYSICAL EXAM:   Constitutional: well appearing, no acute distress, ECOG 1 - Ambulates, capable of light work  Vitals:    BP (!) 151/90   Pulse 103   Temp 97.8 °F (36.6 °C)   Resp 18   Ht 5' 2" (1.575 m)   Wt 77.5 kg (170 lb 14.4 oz)   LMP 1983 (Within Years)   SpO2 98%   BMI 31.26 kg/m²   Eyes: sclera anicteric, EOMI, pupils equal, round and reactive to light  ENT: oral cavity without lesions, moist mucous membranes  Neck: trachea midline, neck supple  Lymphatic: no cervical, supraclavicular or axillary adenopathy  Breast: no masses, skin changes or retractions, non-tender, no scar tissue or mass beneath scar, firm tight pec major muscle on L  Cardiovascular: regular rate, no edema of the upper or lower extremities, radial pulse 2+  Respiratory: unlabored effort, clear to auscultation, no wheezes  Abdomen: soft, non-tender, no rigidity, no masses, no hepatomegaly    Laboratory & X-Ray Findings: None new.      ASSESSMENT: recovering well from acute toxicities of radiation    PLAN: Ms. Damon has tightness of pec major on L side, will ref to PT to improve ROM.  She will continue on endocrine therapy and mammogram surveillance with medical oncology/breast survivorship.    Follow up with me " as needed    I spent approximately 20 minutes reviewing the available records and evaluating the patient, out of which over 50% of the time was spent face to face with the patient in counseling and coordinating this patient's care.    Samir Burger III, M.D.  Radiation Oncology  [facility] [address] [place] [address]  Ph: [phone]  [email or web address]

## 2022-12-05 ENCOUNTER — OFFICE VISIT (OUTPATIENT)
Dept: SURGERY | Facility: CLINIC | Age: 67
End: 2022-12-05
Payer: MEDICARE

## 2022-12-05 ENCOUNTER — TELEPHONE (OUTPATIENT)
Dept: SURGERY | Facility: CLINIC | Age: 67
End: 2022-12-05

## 2022-12-05 VITALS
WEIGHT: 168.63 LBS | RESPIRATION RATE: 16 BRPM | OXYGEN SATURATION: 99 % | DIASTOLIC BLOOD PRESSURE: 80 MMHG | TEMPERATURE: 98 F | HEIGHT: 62 IN | BODY MASS INDEX: 31.03 KG/M2 | HEART RATE: 7 BPM | SYSTOLIC BLOOD PRESSURE: 124 MMHG

## 2022-12-05 DIAGNOSIS — Z12.31 ENCOUNTER FOR SCREENING MAMMOGRAM FOR MALIGNANT NEOPLASM OF BREAST: ICD-10-CM

## 2022-12-05 DIAGNOSIS — R92.8 FOLLOW-UP EXAMINATION OF ABNORMAL MAMMOGRAM: Primary | ICD-10-CM

## 2022-12-05 DIAGNOSIS — Z85.3 ENCOUNTER FOR FOLLOW-UP SURVEILLANCE OF BREAST CANCER: ICD-10-CM

## 2022-12-05 DIAGNOSIS — C77.3 SECONDARY AND UNSPECIFIED MALIGNANT NEOPLASM OF AXILLA AND UPPER LIMB LYMPH NODES: ICD-10-CM

## 2022-12-05 DIAGNOSIS — Z71.89 COUNSELING AND COORDINATION OF CARE: ICD-10-CM

## 2022-12-05 DIAGNOSIS — D05.12 DUCTAL CARCINOMA IN SITU (DCIS) OF LEFT BREAST: ICD-10-CM

## 2022-12-05 DIAGNOSIS — Z08 ENCOUNTER FOR FOLLOW-UP SURVEILLANCE OF BREAST CANCER: ICD-10-CM

## 2022-12-05 PROCEDURE — 4010F PR ACE/ARB THEARPY RXD/TAKEN: ICD-10-PCS | Mod: CPTII,S$GLB,, | Performed by: NURSE PRACTITIONER

## 2022-12-05 PROCEDURE — 99214 PR OFFICE/OUTPT VISIT, EST, LEVL IV, 30-39 MIN: ICD-10-PCS | Mod: S$GLB,,, | Performed by: NURSE PRACTITIONER

## 2022-12-05 PROCEDURE — 99999 PR PBB SHADOW E&M-EST. PATIENT-LVL IV: CPT | Mod: PBBFAC,,, | Performed by: NURSE PRACTITIONER

## 2022-12-05 PROCEDURE — 3079F DIAST BP 80-89 MM HG: CPT | Mod: CPTII,S$GLB,, | Performed by: NURSE PRACTITIONER

## 2022-12-05 PROCEDURE — 3066F NEPHROPATHY DOC TX: CPT | Mod: CPTII,S$GLB,, | Performed by: NURSE PRACTITIONER

## 2022-12-05 PROCEDURE — 3074F SYST BP LT 130 MM HG: CPT | Mod: CPTII,S$GLB,, | Performed by: NURSE PRACTITIONER

## 2022-12-05 PROCEDURE — 1126F AMNT PAIN NOTED NONE PRSNT: CPT | Mod: CPTII,S$GLB,, | Performed by: NURSE PRACTITIONER

## 2022-12-05 PROCEDURE — 1159F PR MEDICATION LIST DOCUMENTED IN MEDICAL RECORD: ICD-10-PCS | Mod: CPTII,S$GLB,, | Performed by: NURSE PRACTITIONER

## 2022-12-05 PROCEDURE — 1157F PR ADVANCE CARE PLAN OR EQUIV PRESENT IN MEDICAL RECORD: ICD-10-PCS | Mod: CPTII,S$GLB,, | Performed by: NURSE PRACTITIONER

## 2022-12-05 PROCEDURE — 3079F PR MOST RECENT DIASTOLIC BLOOD PRESSURE 80-89 MM HG: ICD-10-PCS | Mod: CPTII,S$GLB,, | Performed by: NURSE PRACTITIONER

## 2022-12-05 PROCEDURE — 1126F PR PAIN SEVERITY QUANTIFIED, NO PAIN PRESENT: ICD-10-PCS | Mod: CPTII,S$GLB,, | Performed by: NURSE PRACTITIONER

## 2022-12-05 PROCEDURE — 3008F PR BODY MASS INDEX (BMI) DOCUMENTED: ICD-10-PCS | Mod: CPTII,S$GLB,, | Performed by: NURSE PRACTITIONER

## 2022-12-05 PROCEDURE — 3288F FALL RISK ASSESSMENT DOCD: CPT | Mod: CPTII,S$GLB,, | Performed by: NURSE PRACTITIONER

## 2022-12-05 PROCEDURE — 1101F PR PT FALLS ASSESS DOC 0-1 FALLS W/OUT INJ PAST YR: ICD-10-PCS | Mod: CPTII,S$GLB,, | Performed by: NURSE PRACTITIONER

## 2022-12-05 PROCEDURE — 3074F PR MOST RECENT SYSTOLIC BLOOD PRESSURE < 130 MM HG: ICD-10-PCS | Mod: CPTII,S$GLB,, | Performed by: NURSE PRACTITIONER

## 2022-12-05 PROCEDURE — 1159F MED LIST DOCD IN RCRD: CPT | Mod: CPTII,S$GLB,, | Performed by: NURSE PRACTITIONER

## 2022-12-05 PROCEDURE — 1157F ADVNC CARE PLAN IN RCRD: CPT | Mod: CPTII,S$GLB,, | Performed by: NURSE PRACTITIONER

## 2022-12-05 PROCEDURE — 3066F PR DOCUMENTATION OF TREATMENT FOR NEPHROPATHY: ICD-10-PCS | Mod: CPTII,S$GLB,, | Performed by: NURSE PRACTITIONER

## 2022-12-05 PROCEDURE — 99999 PR PBB SHADOW E&M-EST. PATIENT-LVL IV: ICD-10-PCS | Mod: PBBFAC,,, | Performed by: NURSE PRACTITIONER

## 2022-12-05 PROCEDURE — 4010F ACE/ARB THERAPY RXD/TAKEN: CPT | Mod: CPTII,S$GLB,, | Performed by: NURSE PRACTITIONER

## 2022-12-05 PROCEDURE — 3288F PR FALLS RISK ASSESSMENT DOCUMENTED: ICD-10-PCS | Mod: CPTII,S$GLB,, | Performed by: NURSE PRACTITIONER

## 2022-12-05 PROCEDURE — 99214 OFFICE O/P EST MOD 30 MIN: CPT | Mod: S$GLB,,, | Performed by: NURSE PRACTITIONER

## 2022-12-05 PROCEDURE — 3008F BODY MASS INDEX DOCD: CPT | Mod: CPTII,S$GLB,, | Performed by: NURSE PRACTITIONER

## 2022-12-05 PROCEDURE — 1101F PT FALLS ASSESS-DOCD LE1/YR: CPT | Mod: CPTII,S$GLB,, | Performed by: NURSE PRACTITIONER

## 2022-12-05 NOTE — PROGRESS NOTES
Patient ID: Nadia Damon is a 67 y.o. female.    Chief Complaint:     HPI: Patient presents for breast cancer survivorship and surveillance    DIAGNOSIS: L breast microinvasive carcinoma, pTmi(m) N1mi(sn) cM0, ER/MS negative, high grade     TREATMENT HISTORY:   1. L lumpectomy 9/10/21  2. L sentinel node biopsy 10/12/21  3. Attempted chemotherapy  4. 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 She was treated with 1 cycle of systemic chemotherapy cytoxan and taxotere and udenyca that was discontinued due to toxicity. She had radiation, completed 4/14/22.   Pt heart transplant patient 28 years.     Pt has CKD and is on epo injections and hematology monitors labs    Pt had mckenzie diagnostic mammo and bilateral ultrasounds of breasts that revealed a complex cyst at 9 oclock- 6 mon f/u was recommended- pt was anxious - ultrasound core biopsy performed 10/31/2022- benign results    Pt having left shoulder soreness and pectoralis muscle pain with raising left arm- states Dr. Burger is sending her to PT for eval and treatment    Risk factors identified:     Menarche at 14 y/o  G 2 P 2  First pregnancy at 16  LMP: partial hyst - 1984  Estrogen:none  Radiation to the neck or chest wall- none  Prior breast biopsies or atypical hyperplasia- right breast excisional biopsy- benign- left core biopsy benign in past       FH: mother breast cancer at 40's.     Body mass index is 30.84 kg/m².    Review of Systems   Constitutional: Negative.    HENT: Negative.     Eyes: Negative.    Respiratory: Negative.     Cardiovascular: Negative.    Gastrointestinal: Negative.         No reflux   Endocrine: Negative.    Genitourinary: Negative.    Musculoskeletal:  Positive for back pain (chronic= scheduled for nerve burn 12/7).   Skin: Negative.    Allergic/Immunologic: Negative.    Neurological: Negative.     Hematological: Negative.  Negative for adenopathy.   Psychiatric/Behavioral: Negative.     Breast: Pt denies any breast pain, has left lateral chest wall tenderness after last biopsy, nipple discharge, or palpable mass. No prior trauma or bruising.     Current Outpatient Medications   Medication Sig Dispense Refill    amLODIPine (NORVASC) 2.5 MG tablet Take 1 tablet (2.5 mg total) by mouth once daily. 90 tablet 3    aspirin (ECOTRIN) 81 MG EC tablet Take 1 tablet (81 mg total) by mouth once daily. 90 tablet 3    biotin 10,000 mcg Cap Take 1 tablet by mouth once daily.      calcitonin, salmon, (FORTICAL) 200 unit/actuation nasal spray 1 spray by Nasal route once daily. 3 each 3    carvediloL (COREG) 6.25 MG tablet Take 1 tablet (6.25 mg total) by mouth 2 (two) times daily with meals. 180 tablet 3    cycloSPORINE modified, NEORAL, (NEORAL) 25 MG capsule TAKE 3 CAPSULES (75 MG TOTAL) BY MOUTH 2 (TWO) TIMES DAILY. 540 capsule 6    DULoxetine (CYMBALTA) 30 MG capsule TAKE 1 CAPSULE EVERY DAY 90 capsule 1    EVENING PRIMROSE OIL ORAL Take 1,000 mg by mouth once daily.      furosemide (LASIX) 20 MG tablet Take 1 tablet (20 mg total) by mouth 2 (two) times a day for 5 days, THEN 1 tablet (20 mg total) once daily. Take 1 tablet daily. 370 tablet 0    hydrALAZINE (APRESOLINE) 50 MG tablet Take 1 tablet (50 mg total) by mouth every 8 (eight) hours. TAKE 1 TABLETS  EVERY 8  HOURS. 270 tablet 3    multivitamin capsule Take 1 capsule by mouth once daily.      pantoprazole (PROTONIX) 40 MG tablet TAKE 1 TABLET EVERY DAY 90 tablet 1    pregabalin (LYRICA) 50 MG capsule TAKE 1 CAPSULE 2 TIMES DAILY AT NOON AND NIGHT TIME 180 capsule 1    RETACRIT 20,000 unit/mL injection       temazepam (RESTORIL) 30 mg capsule TAKE 1 CAPSULE AT BEDTIME 30 capsule 5    tiZANidine (ZANAFLEX) 4 MG tablet Take 1 tablet (4 mg total) by mouth 2 (two) times daily as needed. 180 tablet 0    vitamin E 400 UNIT capsule Take 400 Units by mouth once daily.       zolpidem (AMBIEN) 10 mg Tab TAKE 1 TABLET BY MOUTH ONCE DAILY IN THE EVENING 90 tablet 1    busPIRone (BUSPAR) 10 MG tablet Take 1 tablet (10 mg total) by mouth once daily. 90 tablet 3     Current Facility-Administered Medications   Medication Dose Route Frequency Provider Last Rate Last Admin    denosumab (PROLIA) injection 60 mg  60 mg Subcutaneous Q6 Months Prasanth Johnson MD         Facility-Administered Medications Ordered in Other Visits   Medication Dose Route Frequency Provider Last Rate Last Admin    lactated ringers infusion   Intravenous Continuous Jennifer Carr MD 10 mL/hr at 11/09/21 0717 Restarted at 11/09/21 0820       Review of patient's allergies indicates:   Allergen Reactions    Lisinopril Swelling and Rash    Augmentin [amoxicillin-pot clavulanate] Diarrhea       Past Medical History:   Diagnosis Date    Abdominal wall hernia     CT Renal 6/11/2018---Small fat containing superior ventral abdominal wall hernia at the epicardial pacing lead site.    Abnormal mammogram 10/12/2021    Anxiety     Arthritis     ZEN HIPS    Breast cancer in female 08/2021    LEFT BREAST    C. difficile colitis 11/29/2021    Cellulitis of axilla, left 12/23/2021    Chronic diastolic heart failure 12/16/2021    Chronic kidney disease     stage 4, GFR 15-29 ml/min    Chronic midline low back pain without sciatica 06/18/2018    Closed nondisplaced fracture of distal phalanx of left great toe with routine healing 10/22/2018    Coronary artery disease 1993    heart transplant    Cystitis 5/10/2022    Depression     Fibromyalgia     on Lyrica    Heart failure     native heart cardiomyopathy    Heart transplanted 1993    due to cardiomyopathy    History of hyperparathyroidism; Hyperparathyroidism, secondary renal     PT DENIES    Hypertension     Immune disorder     anti rejection meds    Iron deficiency anemia 08/15/2017    Kidney stones     passed per pt    Obesity     Other osteoporosis without current pathological  fracture 08/30/2019    Severe sepsis 11/22/2021    Shingles 2003 approx    left leg    Trouble in sleeping     Urinary incontinence        Past Surgical History:   Procedure Laterality Date    BLADDER SURGERY  2015 approx    mesh - Dr Everett then 2nd reconstructive sx Dr Onofre    BREAST BIOPSY Bilateral     NEGATIVE    BREAST BIOPSY Right 10/31/2022    benign    BREAST LUMPECTOMY Left 2021    BREAST SURGERY Left 09/28/2015    Bx - benign    BREAST SURGERY Right 12/2015    Bx benign    CARDIAC PACEMAKER REMOVAL Left 06/26/2014    Pacer defirillator removed. Put in 1993 aat time of heart transplant    CARPAL TUNNEL RELEASE Left 03/03/2015    Dr. Hall    COLONOSCOPY N/A 02/25/2021    Procedure: COLONOSCOPY;  Surgeon: Freida Ramirez MD;  Location: [facility] ENDO;  Service: Endoscopy;  Laterality: N/A;    HEART TRANSPLANT  1993    HERNIA REPAIR Right 1971 approx    Inguinal    HYSTERECTOMY  1983    vag hyst /LSO     INCISION AND DRAINAGE OF ABSCESS Left 12/24/2021    Procedure: INCISION AND DRAINAGE, ABSCESS;  Surgeon: Joseph Longo MD;  Location: [facility] OR;  Service: General;  Laterality: Left;    INJECTION OF ANESTHETIC AGENT AROUND MEDIAL BRANCH NERVES INNERVATING LUMBAR FACET JOINT Right 10/19/2022    Procedure: Right L4/L5 and L5/S1 MBB;  Surgeon: Jassi Pierre MD;  Location: [facility] PAIN MGT;  Service: Pain Management;  Laterality: Right;    INJECTION OF ANESTHETIC AGENT AROUND MEDIAL BRANCH NERVES INNERVATING LUMBAR FACET JOINT Right 11/09/2022    Procedure: Right L4/L5 and L5/S1 MBB;  Surgeon: Jassi Pierre MD;  Location: [facility] PAIN MGT;  Service: Pain Management;  Laterality: Right;    INJECTION OF ANESTHETIC AGENT INTO SACROILIAC JOINT Right 08/22/2022    Procedure: Right SIJ Injection Right L5/S1 Facte Injection;  Surgeon: Jassi Pierre MD;  Location: [facility] PAIN MGT;  Service: Pain Management;  Laterality: Right;    INSERTION OF TUNNELED CENTRAL VENOUS CATHETER (CVC) WITH SUBCUTANEOUS PORT N/A  2021    Procedure: [id number];  Surgeon: Christoph Douglas MD;  Location: [facility] OR;  Service: General;  Laterality: N/A;    REMOVAL OF VASCULAR ACCESS PORT      SENTINEL LYMPH NODE BIOPSY Left 10/12/2021    Procedure: BIOPSY, LYMPH NODE, SENTINEL;  Surgeon: Christoph Douglas MD;  Location: [facility] OR;  Service: General;  Laterality: Left;    TOE SURGERY         Family History   Problem Relation Age of Onset    Cancer Mother 38        breast    Breast cancer Mother     Breast cancer Maternal Grandmother     Heart disease Maternal Grandmother     Hypertension Son     Cataracts Cousin     Diabetes Neg Hx     Stroke Neg Hx     Kidney disease Neg Hx     Asthma Neg Hx     COPD Neg Hx     Melanoma Neg Hx     Hyperlipidemia Neg Hx        Social History     Socioeconomic History    Marital status: Single    Number of children: 2    Highest education level: 11th grade   Occupational History    Occupation: Retired   Tobacco Use    Smoking status: Never    Smokeless tobacco: Never   Substance and Sexual Activity    Alcohol use: Never     Alcohol/week: 0.0 standard drinks    Drug use: No    Sexual activity: Not Currently     Partners: Male     Birth control/protection: See Surgical Hx   Other Topics Concern    Are you pregnant or think you may be? No    Breast-feeding No   Social History Narrative    Single. 2 children , 1  at 31 yoa  2014 strep throat -  pneumonia and renal complications after not completing course of AB. Other child lives in [place], [place]. Has a cousin locally that could help in an emergency. 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 Caffeine intake =- 1 cola a day. No coffee, + occasional tea, avoids caffeine especially at night. Still drives. She does not have a Living Will or Advanced directive.        Vitals:    22 1020   BP: 124/80   Pulse: (!) 7   Resp: 16   Temp: 97.6 °F (36.4 °C)       Physical Exam  Constitutional:       Appearance: She is well-developed.    HENT:      Head: Normocephalic and atraumatic.      Right Ear: External ear normal.      Left Ear: External ear normal.      Mouth/Throat:      Pharynx: No oropharyngeal exudate.   Eyes:      General: No scleral icterus.        Right eye: No discharge.         Left eye: No discharge.      Conjunctiva/sclera: Conjunctivae normal.      Pupils: Pupils are equal, round, and reactive to light.   Neck:      Thyroid: No thyromegaly.   Cardiovascular:      Rate and Rhythm: Normal rate and regular rhythm.      Heart sounds: Normal heart sounds.   Pulmonary:      Effort: Pulmonary effort is normal.      Breath sounds: Normal breath sounds.   Chest:   Breasts:     Right: No inverted nipple, mass, nipple discharge, skin change or tenderness.      Left: No inverted nipple, mass, nipple discharge, skin change or tenderness.   Abdominal:      General: Bowel sounds are normal.      Palpations: Abdomen is soft.   Musculoskeletal:         General: Normal range of motion.      Right shoulder: No crepitus. Normal strength.      Cervical back: Normal range of motion and neck supple.   Lymphadenopathy:      Head:      Right side of head: No submental, submandibular, tonsillar, preauricular, posterior auricular or occipital adenopathy.      Left side of head: No submental, submandibular, tonsillar, preauricular, posterior auricular or occipital adenopathy.      Cervical: No cervical adenopathy.      Right cervical: No superficial or posterior cervical adenopathy.     Left cervical: No superficial or posterior cervical adenopathy.      Upper Body:      Right upper body: No supraclavicular adenopathy.      Left upper body: No supraclavicular adenopathy.   Skin:     General: Skin is warm and dry.      Coloration: Skin is not pale.      Findings: No erythema or rash.   Neurological:      Mental Status: She is alert and oriented to person, place, and time.      Deep Tendon Reflexes: Reflexes are normal and symmetric.   Psychiatric:          Behavior: Behavior normal.         Thought Content: Thought content normal.         Judgment: Judgment normal.     9/19/2022  Result:   Mammo Digital Diagnostic Bilat with Iván  US Breast Bilateral Limited     History:  Patient is 67 y.o. and is seen for diagnostic imaging.     Films Compared:  Prior images (if available) were compared.     Findings:  This procedure was performed using tomosynthesis. Computer-aided detection was utilized in the interpretation of this examination.  The breasts have scattered areas of fibroglandular density.         Left breast demonstrates postoperative lumpectomy findings of the upper outer breast with post radiation skin thickening.  No detrimental mammographic change appreciated.  Left axillary region postsurgical findings present without concerning mammographic mass.            Ultrasound was performed at the area of pain within the left axilla without corresponding sonographic abnormality.  Follow-up imaging at the 3 o'clock position demonstrates resolution of previously noted fluid with ill-defined hypoechoic area felt to represent postsurgical scarring.           Right breast demonstrates multiple lateral oval masses with the more anterior mass larger when compared to 07/13/2021.  Ultrasound demonstrates multiple complex cysts at 09:00 o'clock corresponding to mammographic lesions.  Largest cyst measures 9 mm and corresponds to the more anterior mammographic lesion.  Six-month follow-up can be obtained.            Impression:  Right breast 09:00 o'clock complex cyst. Six-month follow-up recommended.   No axillary abnormality at the area pain with postsurgical and radiation changes of the left breast. Continue follow-up recommended.         BI-RADS Category:   Overall: 3 - Probably Benign     Recommendation:  Short interval follow-up is recommended in 6 Months.      10/31/2022  Final Pathologic Diagnosis 1. Right breast (3 o'clock position), biopsy:       -  Benign breast  tissue with fibrosis and marked duct ectasia with   microcyst formation and papillary         apocrine metaplasia       -  Negative for atypia or malignancy          Assessment & Plan:  Follow-up examination of abnormal mammogram    Encounter for follow-up surveillance of breast cancer    Counseling and coordination of care    Encounter for screening mammogram for malignant neoplasm of breast    Secondary and unspecified malignant neoplasm of axilla and upper limb lymph nodes    Ductal carcinoma in situ (DCIS) of left breast        Negative clinical findings on exam.  Recommend right diagnostic mammo and ultrasound in April 2023 for 6 mon f/u after benign core biopsy with exam  BSE encouraged- call for any changes  Eating healthy diet and exercising encouraged

## 2022-12-05 NOTE — TELEPHONE ENCOUNTER
Returned phone call to patient. Patient mentioned that she would be 10-15 mins late for appointment. Confirmed we are on the 2nd floor at the '[facility]. Voiced understanding.

## 2022-12-05 NOTE — TELEPHONE ENCOUNTER
----- Message from Delmy Baker sent at 12/5/2022  9:34 AM CST -----  Contact: Nadia Ramos called stating she will be 10 to 15 mins late. Please call her back at [phone]      Thanks  CF

## 2022-12-07 ENCOUNTER — HOSPITAL ENCOUNTER (OUTPATIENT)
Facility: HOSPITAL | Age: 67
Discharge: HOME | End: 2022-12-07
Attending: ANESTHESIOLOGY | Admitting: ANESTHESIOLOGY
Payer: MEDICARE

## 2022-12-07 VITALS
HEART RATE: 80 BPM | WEIGHT: 167.25 LBS | BODY MASS INDEX: 30.78 KG/M2 | DIASTOLIC BLOOD PRESSURE: 67 MMHG | RESPIRATION RATE: 17 BRPM | SYSTOLIC BLOOD PRESSURE: 117 MMHG | TEMPERATURE: 98 F | HEIGHT: 62 IN | OXYGEN SATURATION: 98 %

## 2022-12-07 DIAGNOSIS — M47.816 LUMBAR SPONDYLOSIS: ICD-10-CM

## 2022-12-07 PROCEDURE — 64636 DESTROY L/S FACET JNT ADDL: CPT | Mod: RT,,, | Performed by: ANESTHESIOLOGY

## 2022-12-07 PROCEDURE — 63600175 PHARM REV CODE 636 W HCPCS: Performed by: ANESTHESIOLOGY

## 2022-12-07 PROCEDURE — 64635 DESTROY LUMB/SAC FACET JNT: CPT | Mod: RT,,, | Performed by: ANESTHESIOLOGY

## 2022-12-07 PROCEDURE — 25000003 PHARM REV CODE 250: Performed by: ANESTHESIOLOGY

## 2022-12-07 PROCEDURE — 64636 DESTROY L/S FACET JNT ADDL: CPT | Performed by: ANESTHESIOLOGY

## 2022-12-07 PROCEDURE — 64635 PR DESTROY LUMB/SAC FACET JNT: ICD-10-PCS | Mod: RT,,, | Performed by: ANESTHESIOLOGY

## 2022-12-07 PROCEDURE — 99152 MOD SED SAME PHYS/QHP 5/>YRS: CPT | Performed by: ANESTHESIOLOGY

## 2022-12-07 PROCEDURE — 64635 DESTROY LUMB/SAC FACET JNT: CPT | Performed by: ANESTHESIOLOGY

## 2022-12-07 PROCEDURE — 64636 PR DESTROY L/S FACET JNT ADDL: ICD-10-PCS | Mod: RT,,, | Performed by: ANESTHESIOLOGY

## 2022-12-07 RX ORDER — METHYLPREDNISOLONE ACETATE 40 MG/ML
INJECTION, SUSPENSION INTRA-ARTICULAR; INTRALESIONAL; INTRAMUSCULAR; SOFT TISSUE
Status: DISCONTINUED | OUTPATIENT
Start: 2022-12-07 | End: 2022-12-07 | Stop reason: HOSPADM

## 2022-12-07 RX ORDER — FENTANYL CITRATE 50 UG/ML
INJECTION, SOLUTION INTRAMUSCULAR; INTRAVENOUS
Status: DISCONTINUED | OUTPATIENT
Start: 2022-12-07 | End: 2022-12-07 | Stop reason: HOSPADM

## 2022-12-07 RX ORDER — BUPIVACAINE HYDROCHLORIDE 2.5 MG/ML
INJECTION, SOLUTION EPIDURAL; INFILTRATION; INTRACAUDAL
Status: DISCONTINUED | OUTPATIENT
Start: 2022-12-07 | End: 2022-12-07 | Stop reason: HOSPADM

## 2022-12-07 RX ORDER — LIDOCAINE HYDROCHLORIDE 10 MG/ML
INJECTION, SOLUTION EPIDURAL; INFILTRATION; INTRACAUDAL; PERINEURAL
Status: DISCONTINUED | OUTPATIENT
Start: 2022-12-07 | End: 2022-12-07 | Stop reason: HOSPADM

## 2022-12-07 RX ORDER — MIDAZOLAM HYDROCHLORIDE 1 MG/ML
INJECTION, SOLUTION INTRAMUSCULAR; INTRAVENOUS
Status: DISCONTINUED | OUTPATIENT
Start: 2022-12-07 | End: 2022-12-07 | Stop reason: HOSPADM

## 2022-12-07 RX ORDER — ONDANSETRON 2 MG/ML
4 INJECTION INTRAMUSCULAR; INTRAVENOUS ONCE AS NEEDED
Status: COMPLETED | OUTPATIENT
Start: 2022-12-07 | End: 2022-12-07

## 2022-12-07 RX ADMIN — ONDANSETRON HYDROCHLORIDE 4 MG: 2 SOLUTION INTRAMUSCULAR; INTRAVENOUS at 09:12

## 2022-12-07 NOTE — OP NOTE
Lumbar Medial nerve branch block radiofrequency ablation Under Fluoroscopy , Right L4/L5 and L5/S1    INFORMED CONSENT: The procedure, risks, benefits and options were discussed with patient. There are no contraindications to the procedure. The patient expressed understanding and agreed to proceed. The personnel performing the procedure was discussed.    Date of procedure 12/07/2022    Time-out taken to identify patient and procedure side prior to starting the procedure.                     PROCEDURE: Right radiofrequency ablation of the the medial branch nerves at the   transverse process of  L4, L5, and the sacral ala    Pre Procedure diagnosis:    Lumbar spondylosis [M47.816]  1. Lumbar spondylosis        Post-Procedure diagnosis:   same      PHYSICIAN: Jassi Pierre MD    ASSISTANTS: None     LOCAL ANESTHETIC USED: 1ml of Lidocaine 1%, at each level    Sedation: Conscious sedation provided by M.D    SEDATION MEDICATIONS: local/IV sedation: Versed 2 mg and fentanyl 100 mcg IV.  Conscious sedation ordered by MD.  Patient reevaluated and sedation administered by MD and monitored by RN.  Total sedation time was less than 20 min.    Total sedation time was >20 min but <30min    ESTIMATED BLOOD LOSS: None.     COMPLICATIONS: None.       TECHNIQUE: Laying in a prone position, the patient was prepped and draped in the usual sterile fashion using ChloraPrep and fenestrated drape. The level was determined under fluoroscopic guidance. Local anesthetic was given by going down to the hub of the 27-gauge 1.25in needle and raising a wheel. A 18-gauge 10mm curved active tip needle was introduced to the anatomic local of the medial branch at each of the above levels using fluoroscopy. Then sensory and motor testing was performed to confirm that the needle tips were in the correct location. Then after negative aspiration, 1ml of lidocaine PF 1% was injected at each level. This was followed by thermal lesioning at 80 degrees  celsius for 90 seconds. After lesioning, 0.5ml of bupivacaine PF 0.25% and 5mg of DepoMedrol was injected at each level.    The patient tolerated the procedure well. Did not have any procedure related motor deficit at the conclusion of the procedure      The patient was monitored for approximately 30 minutes after the procedure.  Patient was given post procedure and discharge instructions to follow at home.  The patient was discharged in a stable condition

## 2022-12-07 NOTE — H&P
HPI  Patient presenting for Procedure(s) (LRB):  Right L4/L5 and L5/S1 Lumbar RFA (Right)     Patient on Anti-coagulation yes, ASA, may continue    No health changes since previous encounter    Past Medical History:   Diagnosis Date    Abdominal wall hernia     CT Renal 6/11/2018---Small fat containing superior ventral abdominal wall hernia at the epicardial pacing lead site.    Abnormal mammogram 10/12/2021    Anxiety     Arthritis     ZEN HIPS    Breast cancer in female 08/2021    LEFT BREAST    C. difficile colitis 11/29/2021    Cellulitis of axilla, left 12/23/2021    Chronic diastolic heart failure 12/16/2021    Chronic kidney disease     stage 4, GFR 15-29 ml/min    Chronic midline low back pain without sciatica 06/18/2018    Closed nondisplaced fracture of distal phalanx of left great toe with routine healing 10/22/2018    Coronary artery disease 1993    heart transplant    Cystitis 5/10/2022    Depression     Fibromyalgia     on Lyrica    Heart failure     native heart cardiomyopathy    Heart transplanted 1993    due to cardiomyopathy    History of hyperparathyroidism; Hyperparathyroidism, secondary renal     PT DENIES    Hypertension     Immune disorder     anti rejection meds    Iron deficiency anemia 08/15/2017    Kidney stones     passed per pt    Obesity     Other osteoporosis without current pathological fracture 08/30/2019    Severe sepsis 11/22/2021    Shingles 2003 approx    left leg    Trouble in sleeping     Urinary incontinence      Past Surgical History:   Procedure Laterality Date    BLADDER SURGERY  2015 approx    mesh - Dr Everett then 2nd reconstructive sx Dr Onofre    BREAST BIOPSY Bilateral     NEGATIVE    BREAST BIOPSY Right 10/31/2022    benign    BREAST LUMPECTOMY Left 2021    BREAST SURGERY Left 09/28/2015    Bx - benign    BREAST SURGERY Right 12/2015    Bx benign    CARDIAC PACEMAKER REMOVAL Left 06/26/2014    Pacer defirillator removed. Put in 1993 aat time of heart transplant     CARPAL TUNNEL RELEASE Left 03/03/2015    Dr. Hall    COLONOSCOPY N/A 02/25/2021    Procedure: COLONOSCOPY;  Surgeon: Freida Ramirez MD;  Location: [facility];  Service: Endoscopy;  Laterality: N/A;    HEART TRANSPLANT  1993    HERNIA REPAIR Right 1971 approx    Inguinal    HYSTERECTOMY  1983    vag hyst /LSO     INCISION AND DRAINAGE OF ABSCESS Left 12/24/2021    Procedure: INCISION AND DRAINAGE, ABSCESS;  Surgeon: Joseph Longo MD;  Location: [facility] OR;  Service: General;  Laterality: Left;    INJECTION OF ANESTHETIC AGENT AROUND MEDIAL BRANCH NERVES INNERVATING LUMBAR FACET JOINT Right 10/19/2022    Procedure: Right L4/L5 and L5/S1 MBB;  Surgeon: Jassi Pierre MD;  Location: [facility];  Service: Pain Management;  Laterality: Right;    INJECTION OF ANESTHETIC AGENT AROUND MEDIAL BRANCH NERVES INNERVATING LUMBAR FACET JOINT Right 11/09/2022    Procedure: Right L4/L5 and L5/S1 MBB;  Surgeon: Jassi Pierre MD;  Location: [facility];  Service: Pain Management;  Laterality: Right;    INJECTION OF ANESTHETIC AGENT INTO SACROILIAC JOINT Right 08/22/2022    Procedure: Right SIJ Injection Right L5/S1 Facte Injection;  Surgeon: Jassi Pierre MD;  Location: [facility];  Service: Pain Management;  Laterality: Right;    INSERTION OF TUNNELED CENTRAL VENOUS CATHETER (CVC) WITH SUBCUTANEOUS PORT N/A 11/09/2021    Procedure: [id number];  Surgeon: Christoph Douglas MD;  Location: [facility] OR;  Service: General;  Laterality: N/A;    REMOVAL OF VASCULAR ACCESS PORT      SENTINEL LYMPH NODE BIOPSY Left 10/12/2021    Procedure: BIOPSY, LYMPH NODE, SENTINEL;  Surgeon: Christoph Douglas MD;  Location: [facility] OR;  Service: General;  Laterality: Left;    TOE SURGERY       Review of patient's allergies indicates:   Allergen Reactions    Lisinopril Swelling and Rash    Augmentin [amoxicillin-pot clavulanate] Diarrhea        Current Facility-Administered Medications on File Prior to Encounter   Medication Dose Route  Frequency Provider Last Rate Last Admin    lactated ringers infusion   Intravenous Continuous Jennifer Carr MD 10 mL/hr at 11/09/21 0717 Restarted at 11/09/21 0820     Current Outpatient Medications on File Prior to Encounter   Medication Sig Dispense Refill    aspirin (ECOTRIN) 81 MG EC tablet Take 1 tablet (81 mg total) by mouth once daily. 90 tablet 3    biotin 10,000 mcg Cap Take 1 tablet by mouth once daily.      carvediloL (COREG) 6.25 MG tablet Take 1 tablet (6.25 mg total) by mouth 2 (two) times daily with meals. 180 tablet 3    cycloSPORINE modified, NEORAL, (NEORAL) 25 MG capsule TAKE 3 CAPSULES (75 MG TOTAL) BY MOUTH 2 (TWO) TIMES DAILY. 540 capsule 6    furosemide (LASIX) 20 MG tablet Take 1 tablet (20 mg total) by mouth 2 (two) times a day for 5 days, THEN 1 tablet (20 mg total) once daily. Take 1 tablet daily. 370 tablet 0    multivitamin capsule Take 1 capsule by mouth once daily.      pantoprazole (PROTONIX) 40 MG tablet TAKE 1 TABLET EVERY DAY 90 tablet 1    tiZANidine (ZANAFLEX) 4 MG tablet Take 1 tablet (4 mg total) by mouth 2 (two) times daily as needed. 180 tablet 0    vitamin E 400 UNIT capsule Take 400 Units by mouth once daily.      busPIRone (BUSPAR) 10 MG tablet Take 1 tablet (10 mg total) by mouth once daily. 90 tablet 3    DULoxetine (CYMBALTA) 30 MG capsule TAKE 1 CAPSULE EVERY DAY 90 capsule 1    EVENING PRIMROSE OIL ORAL Take 1,000 mg by mouth once daily.      hydrALAZINE (APRESOLINE) 50 MG tablet Take 1 tablet (50 mg total) by mouth every 8 (eight) hours. TAKE 1 TABLETS  EVERY 8  HOURS. 270 tablet 3    pregabalin (LYRICA) 50 MG capsule TAKE 1 CAPSULE 2 TIMES DAILY AT NOON AND NIGHT TIME 180 capsule 1    RETACRIT 20,000 unit/mL injection       temazepam (RESTORIL) 30 mg capsule TAKE 1 CAPSULE AT BEDTIME 30 capsule 5    zolpidem (AMBIEN) 10 mg Tab TAKE 1 TABLET BY MOUTH ONCE DAILY IN THE EVENING 90 tablet 1        PMHx, PSHx, Allergies, Medications reviewed in epic    ROS negative  "except pain complaints in HPI    OBJECTIVE:    /77 (BP Location: Right arm, Patient Position: Sitting)   Pulse 95   Temp 98 °F (36.7 °C) (Temporal)   Resp 16   Ht 5' 2" (1.575 m)   Wt 75.8 kg (167 lb 3.5 oz)   LMP 06/20/1983 (Within Years)   SpO2 97%   BMI 30.58 kg/m²     PHYSICAL EXAMINATION:    GENERAL: Well appearing, in no acute distress, alert and oriented x3.  PSYCH:  Mood and affect appropriate.  SKIN: Skin color, texture, turgor normal, no rashes or lesions which will impact the procedure.  CV: RRR with palpation of the radial artery.  PULM: No evidence of respiratory difficulty, symmetric chest rise. Clear to auscultation.  NEURO: Cranial nerves grossly intact.    Plan:    Proceed with procedure as planned Procedure(s) (LRB):  Right L4/L5 and L5/S1 Lumbar RFA (Right)    Jassi Pierre MD  12/07/2022            "

## 2022-12-07 NOTE — DISCHARGE INSTRUCTIONS

## 2022-12-11 ENCOUNTER — PATIENT MESSAGE (OUTPATIENT)
Dept: SURGERY | Facility: CLINIC | Age: 67
End: 2022-12-11
Payer: MEDICARE

## 2022-12-15 ENCOUNTER — OUTPATIENT CASE MANAGEMENT (OUTPATIENT)
Dept: ADMINISTRATIVE | Facility: OTHER | Age: 67
End: 2022-12-15
Payer: MEDICARE

## 2022-12-15 NOTE — PROGRESS NOTES
Outpatient Care Management  Plan of Care Follow Up Visit    Patient: Nadia Damon  MRN: [id number]  Date of Service: 12/15/2022  Completed by: Geri Saha RN  Referral Date: 09/02/2022    No chief complaint on file.      Brief Summary: Phone contact with Mrs Zuniga today and remaining CKD care plan tasks were reviewed and she voiced good understanding. She also agreed to plan to d/c from [facility].   Next Steps: D/C from [facility] today. Geri Saha RN

## 2023-01-03 ENCOUNTER — LAB VISIT (OUTPATIENT)
Dept: LAB | Facility: HOSPITAL | Age: 68
End: 2023-01-03
Attending: INTERNAL MEDICINE
Payer: MEDICARE

## 2023-01-03 DIAGNOSIS — M81.0 OSTEOPOROSIS, UNSPECIFIED OSTEOPOROSIS TYPE, UNSPECIFIED PATHOLOGICAL FRACTURE PRESENCE: ICD-10-CM

## 2023-01-03 LAB
ALBUMIN SERPL BCP-MCNC: 3.4 G/DL (ref 3.5–5.2)
ALP SERPL-CCNC: 125 U/L (ref 55–135)
ALT SERPL W/O P-5'-P-CCNC: 18 U/L (ref 10–44)
ANION GAP SERPL CALC-SCNC: 14 MMOL/L (ref 8–16)
AST SERPL-CCNC: 31 U/L (ref 10–40)
BILIRUB SERPL-MCNC: 0.7 MG/DL (ref 0.1–1)
BUN SERPL-MCNC: 32 MG/DL (ref 8–23)
CALCIUM SERPL-MCNC: 9.2 MG/DL (ref 8.7–10.5)
CHLORIDE SERPL-SCNC: 106 MMOL/L (ref 95–110)
CO2 SERPL-SCNC: 21 MMOL/L (ref 23–29)
CREAT SERPL-MCNC: 2.8 MG/DL (ref 0.5–1.4)
EST. GFR  (NO RACE VARIABLE): 18 ML/MIN/1.73 M^2
GLUCOSE SERPL-MCNC: 119 MG/DL (ref 70–110)
POTASSIUM SERPL-SCNC: 4.7 MMOL/L (ref 3.5–5.1)
PROT SERPL-MCNC: 7.9 G/DL (ref 6–8.4)
SODIUM SERPL-SCNC: 141 MMOL/L (ref 136–145)

## 2023-01-03 PROCEDURE — 80053 COMPREHEN METABOLIC PANEL: CPT | Mod: HCNC | Performed by: INTERNAL MEDICINE

## 2023-01-03 PROCEDURE — 36415 COLL VENOUS BLD VENIPUNCTURE: CPT | Mod: HCNC | Performed by: INTERNAL MEDICINE

## 2023-01-03 PROCEDURE — 83970 ASSAY OF PARATHORMONE: CPT | Mod: HCNC | Performed by: INTERNAL MEDICINE

## 2023-01-04 ENCOUNTER — HOSPITAL ENCOUNTER (OUTPATIENT)
Dept: RADIOLOGY | Facility: HOSPITAL | Age: 68
Discharge: HOME | End: 2023-01-04
Attending: NURSE PRACTITIONER
Payer: MEDICARE

## 2023-01-04 ENCOUNTER — TELEPHONE (OUTPATIENT)
Dept: PRIMARY CARE CLINIC | Facility: CLINIC | Age: 68
End: 2023-01-04
Payer: MEDICARE

## 2023-01-04 ENCOUNTER — OFFICE VISIT (OUTPATIENT)
Dept: PRIMARY CARE CLINIC | Facility: CLINIC | Age: 68
End: 2023-01-04
Payer: MEDICARE

## 2023-01-04 VITALS
HEIGHT: 62 IN | HEART RATE: 88 BPM | DIASTOLIC BLOOD PRESSURE: 76 MMHG | BODY MASS INDEX: 30.58 KG/M2 | SYSTOLIC BLOOD PRESSURE: 134 MMHG | OXYGEN SATURATION: 99 % | TEMPERATURE: 98 F

## 2023-01-04 DIAGNOSIS — F41.9 ANXIETY: ICD-10-CM

## 2023-01-04 DIAGNOSIS — M51.35 DDD (DEGENERATIVE DISC DISEASE), THORACOLUMBAR: ICD-10-CM

## 2023-01-04 DIAGNOSIS — K43.9 VENTRAL HERNIA WITHOUT OBSTRUCTION OR GANGRENE: ICD-10-CM

## 2023-01-04 DIAGNOSIS — R10.9 FLANK PAIN: ICD-10-CM

## 2023-01-04 DIAGNOSIS — M46.1 SACROILIITIS: Primary | ICD-10-CM

## 2023-01-04 DIAGNOSIS — Z94.1 HEART TRANSPLANTED: Chronic | ICD-10-CM

## 2023-01-04 DIAGNOSIS — R10.11 RIGHT UPPER QUADRANT ABDOMINAL PAIN: ICD-10-CM

## 2023-01-04 DIAGNOSIS — N18.4 STAGE 4 CHRONIC KIDNEY DISEASE: ICD-10-CM

## 2023-01-04 DIAGNOSIS — D69.6 THROMBOCYTOPENIA, UNSPECIFIED: ICD-10-CM

## 2023-01-04 DIAGNOSIS — E21.3 HYPERPARATHYROIDISM: ICD-10-CM

## 2023-01-04 DIAGNOSIS — I70.0 AORTIC ATHEROSCLEROSIS: ICD-10-CM

## 2023-01-04 DIAGNOSIS — D84.821 IMMUNOSUPPRESSION DUE TO DRUG THERAPY: ICD-10-CM

## 2023-01-04 DIAGNOSIS — F33.9 CHRONIC MAJOR DEPRESSIVE DISORDER, RECURRENT EPISODE: ICD-10-CM

## 2023-01-04 DIAGNOSIS — M81.8 OTHER OSTEOPOROSIS WITHOUT CURRENT PATHOLOGICAL FRACTURE: ICD-10-CM

## 2023-01-04 DIAGNOSIS — M54.14 THORACIC RADICULOPATHY: Primary | ICD-10-CM

## 2023-01-04 DIAGNOSIS — Z79.899 IMMUNOSUPPRESSION DUE TO DRUG THERAPY: ICD-10-CM

## 2023-01-04 LAB
BILIRUB SERPL-MCNC: NORMAL MG/DL
BLOOD URINE, POC: NORMAL
CLARITY, POC UA: CLEAR
COLOR, POC UA: YELLOW
GLUCOSE UR QL STRIP: NORMAL
KETONES UR QL STRIP: NORMAL
LEUKOCYTE ESTERASE URINE, POC: NORMAL
NITRITE, POC UA: NORMAL
PH, POC UA: 5
PROTEIN, POC: NORMAL
PTH-INTACT SERPL-MCNC: 459.8 PG/ML (ref 9–77)
SPECIFIC GRAVITY, POC UA: 1
UROBILINOGEN, POC UA: NORMAL

## 2023-01-04 PROCEDURE — 74176 CT RENAL STONE STUDY ABD PELVIS WO: ICD-10-PCS | Mod: 26,HCNC,, | Performed by: RADIOLOGY

## 2023-01-04 PROCEDURE — 99999 PR PBB SHADOW E&M-EST. PATIENT-LVL IV: CPT | Mod: PBBFAC,HCNC,, | Performed by: NURSE PRACTITIONER

## 2023-01-04 PROCEDURE — 71110 X-RAY EXAM RIBS BIL 3 VIEWS: CPT | Mod: 26,HCNC,, | Performed by: RADIOLOGY

## 2023-01-04 PROCEDURE — 3288F FALL RISK ASSESSMENT DOCD: CPT | Mod: HCNC,CPTII,S$GLB, | Performed by: NURSE PRACTITIONER

## 2023-01-04 PROCEDURE — 81002 URINALYSIS NONAUTO W/O SCOPE: CPT | Mod: HCNC,S$GLB,, | Performed by: NURSE PRACTITIONER

## 2023-01-04 PROCEDURE — 3078F DIAST BP <80 MM HG: CPT | Mod: HCNC,CPTII,S$GLB, | Performed by: NURSE PRACTITIONER

## 2023-01-04 PROCEDURE — 81002 POCT URINE DIPSTICK WITHOUT MICROSCOPE: ICD-10-PCS | Mod: HCNC,S$GLB,, | Performed by: NURSE PRACTITIONER

## 2023-01-04 PROCEDURE — 3288F PR FALLS RISK ASSESSMENT DOCUMENTED: ICD-10-PCS | Mod: HCNC,CPTII,S$GLB, | Performed by: NURSE PRACTITIONER

## 2023-01-04 PROCEDURE — 1125F PR PAIN SEVERITY QUANTIFIED, PAIN PRESENT: ICD-10-PCS | Mod: HCNC,CPTII,S$GLB, | Performed by: NURSE PRACTITIONER

## 2023-01-04 PROCEDURE — 74176 CT ABD & PELVIS W/O CONTRAST: CPT | Mod: TC,HCNC

## 2023-01-04 PROCEDURE — 99215 PR OFFICE/OUTPT VISIT, EST, LEVL V, 40-54 MIN: ICD-10-PCS | Mod: HCNC,S$GLB,, | Performed by: NURSE PRACTITIONER

## 2023-01-04 PROCEDURE — 99999 PR PBB SHADOW E&M-EST. PATIENT-LVL IV: ICD-10-PCS | Mod: PBBFAC,HCNC,, | Performed by: NURSE PRACTITIONER

## 2023-01-04 PROCEDURE — 3075F SYST BP GE 130 - 139MM HG: CPT | Mod: HCNC,CPTII,S$GLB, | Performed by: NURSE PRACTITIONER

## 2023-01-04 PROCEDURE — 1159F PR MEDICATION LIST DOCUMENTED IN MEDICAL RECORD: ICD-10-PCS | Mod: HCNC,CPTII,S$GLB, | Performed by: NURSE PRACTITIONER

## 2023-01-04 PROCEDURE — 3078F PR MOST RECENT DIASTOLIC BLOOD PRESSURE < 80 MM HG: ICD-10-PCS | Mod: HCNC,CPTII,S$GLB, | Performed by: NURSE PRACTITIONER

## 2023-01-04 PROCEDURE — 1101F PT FALLS ASSESS-DOCD LE1/YR: CPT | Mod: HCNC,CPTII,S$GLB, | Performed by: NURSE PRACTITIONER

## 2023-01-04 PROCEDURE — 3008F BODY MASS INDEX DOCD: CPT | Mod: HCNC,CPTII,S$GLB, | Performed by: NURSE PRACTITIONER

## 2023-01-04 PROCEDURE — 1125F AMNT PAIN NOTED PAIN PRSNT: CPT | Mod: HCNC,CPTII,S$GLB, | Performed by: NURSE PRACTITIONER

## 2023-01-04 PROCEDURE — 1157F PR ADVANCE CARE PLAN OR EQUIV PRESENT IN MEDICAL RECORD: ICD-10-PCS | Mod: HCNC,CPTII,S$GLB, | Performed by: NURSE PRACTITIONER

## 2023-01-04 PROCEDURE — 71110 X-RAY EXAM RIBS BIL 3 VIEWS: CPT | Mod: TC,HCNC

## 2023-01-04 PROCEDURE — 3008F PR BODY MASS INDEX (BMI) DOCUMENTED: ICD-10-PCS | Mod: HCNC,CPTII,S$GLB, | Performed by: NURSE PRACTITIONER

## 2023-01-04 PROCEDURE — 71110 XR RIBS 3 VIEWS BILATERAL: ICD-10-PCS | Mod: 26,HCNC,, | Performed by: RADIOLOGY

## 2023-01-04 PROCEDURE — 1101F PR PT FALLS ASSESS DOC 0-1 FALLS W/OUT INJ PAST YR: ICD-10-PCS | Mod: HCNC,CPTII,S$GLB, | Performed by: NURSE PRACTITIONER

## 2023-01-04 PROCEDURE — 74176 CT ABD & PELVIS W/O CONTRAST: CPT | Mod: 26,HCNC,, | Performed by: RADIOLOGY

## 2023-01-04 PROCEDURE — 81001 URINALYSIS AUTO W/SCOPE: CPT | Mod: HCNC | Performed by: NURSE PRACTITIONER

## 2023-01-04 PROCEDURE — 1157F ADVNC CARE PLAN IN RCRD: CPT | Mod: HCNC,CPTII,S$GLB, | Performed by: NURSE PRACTITIONER

## 2023-01-04 PROCEDURE — 99215 OFFICE O/P EST HI 40 MIN: CPT | Mod: HCNC,S$GLB,, | Performed by: NURSE PRACTITIONER

## 2023-01-04 PROCEDURE — 1159F MED LIST DOCD IN RCRD: CPT | Mod: HCNC,CPTII,S$GLB, | Performed by: NURSE PRACTITIONER

## 2023-01-04 PROCEDURE — 3075F PR MOST RECENT SYSTOLIC BLOOD PRESS GE 130-139MM HG: ICD-10-PCS | Mod: HCNC,CPTII,S$GLB, | Performed by: NURSE PRACTITIONER

## 2023-01-04 RX ORDER — CALCITONIN SALMON 200 [IU]/.09ML
1 SPRAY, METERED NASAL DAILY
Qty: 3 EACH | Refills: 3 | Status: SHIPPED | OUTPATIENT
Start: 2023-01-04 | End: 2023-08-29 | Stop reason: SDUPTHER

## 2023-01-04 RX ORDER — BUSPIRONE HYDROCHLORIDE 10 MG/1
10 TABLET ORAL DAILY
Qty: 90 TABLET | Refills: 3 | Status: SHIPPED | OUTPATIENT
Start: 2023-01-04 | End: 2023-02-13 | Stop reason: SDUPTHER

## 2023-01-04 RX ORDER — HYDROCODONE BITARTRATE AND ACETAMINOPHEN 7.5; 325 MG/1; MG/1
1 TABLET ORAL EVERY 6 HOURS PRN
Qty: 15 TABLET | Refills: 0 | Status: SHIPPED | OUTPATIENT
Start: 2023-01-04 | End: 2023-01-11

## 2023-01-04 NOTE — ASSESSMENT & PLAN NOTE
Suspect cause of severe pain.   On max renal dose of Lyrica.   Norco for acute pain.   Message to pain mgmt.

## 2023-01-04 NOTE — PROGRESS NOTES
"Nadia Damon  01/04/2023  [id number]    Luz Dickson NP  Patient Care Team:  Luz Dickson NP as PCP - General (Family Medicine)  Miguel Soni Jr., MD as Consulting Physician (Vascular Surgery)  Ike King MD as Consulting Physician (Cardiology)  Courtney Tubbs MD as Consulting Physician (Cardiology)  Carter Crawford MD as Consulting Physician (Nephrology)  Paxton Vasques OD as Consulting Physician (Optometry)  PRANAY Villalobos MD as Obstetrician (Obstetrics)  Parker Mccarthy IV, MD (Urology)  Ike King MD as Consulting Physician (Cardiology)  Jose Roland MD as Consulting Physician (Dermatology)  Prasanth Johnson MD as Consulting Physician (Rheumatology)  Ayanna Hart RN as Oncology Navigator  Nora Morin LCSW as   Elis Wick MD as Physician (Internal Medicine)      [facility] Note      Chief Complaint:  Chief Complaint   Patient presents with    Severe RT side pain        History of Present Illness:  HPI    Severe pain right side, flank? Worse with movement. No injury. Onset last week, gradually worsening. Burning/"grabbing" pain. Improved some with walking. No relief with APAP, Biofreeze.     Decreased appetite, chronically.     Review of Systems   Constitutional:  Positive for chills. Negative for fever and malaise/fatigue.   Respiratory:  Negative for cough and shortness of breath.    Gastrointestinal:  Positive for constipation and nausea. Negative for diarrhea and vomiting.   Genitourinary:  Positive for flank pain. Negative for dysuria and frequency.        Chronic occasional urinary incontinence.    Musculoskeletal:  Negative for falls, joint pain and myalgias.       The following were reviewed: Active problem list, medication list, allergies, family history, social history, and Health Maintenance.     History:  Past Medical History:   Diagnosis Date    Abdominal wall hernia     CT Renal 6/11/2018---Small fat containing superior " ventral abdominal wall hernia at the epicardial pacing lead site.    Abnormal mammogram 10/12/2021    Anxiety     Arthritis     ZEN HIPS    Breast cancer in female 08/2021    LEFT BREAST    C. difficile colitis 11/29/2021    Cellulitis of axilla, left 12/23/2021    Chronic diastolic heart failure 12/16/2021    Chronic kidney disease     stage 4, GFR 15-29 ml/min    Chronic midline low back pain without sciatica 06/18/2018    Closed nondisplaced fracture of distal phalanx of left great toe with routine healing 10/22/2018    Coronary artery disease 1993    heart transplant    Cystitis 5/10/2022    Depression     Fibromyalgia     on Lyrica    Heart failure     native heart cardiomyopathy    Heart transplanted 1993    due to cardiomyopathy    History of hyperparathyroidism; Hyperparathyroidism, secondary renal     PT DENIES    Hypertension     Immune disorder     anti rejection meds    Iron deficiency anemia 08/15/2017    Kidney stones     passed per pt    Obesity     Other osteoporosis without current pathological fracture 08/30/2019    Severe sepsis 11/22/2021    Shingles 2003 approx    left leg    Trouble in sleeping     Urinary incontinence      Past Surgical History:   Procedure Laterality Date    BLADDER SURGERY  2015 approx    mesh - Dr Everett then 2nd reconstructive sx Dr Onofre    BREAST BIOPSY Bilateral     NEGATIVE    BREAST BIOPSY Right 10/31/2022    benign    BREAST LUMPECTOMY Left 2021    BREAST SURGERY Left 09/28/2015    Bx - benign    BREAST SURGERY Right 12/2015    Bx benign    CARDIAC PACEMAKER REMOVAL Left 06/26/2014    Pacer defirillator removed. Put in 1993 aat time of heart transplant    CARPAL TUNNEL RELEASE Left 03/03/2015    Dr. Hall    COLONOSCOPY N/A 02/25/2021    Procedure: COLONOSCOPY;  Surgeon: Freida Ramirez MD;  Location: [facility];  Service: Endoscopy;  Laterality: N/A;    HEART TRANSPLANT  1993    HERNIA REPAIR Right 1971 approx    Inguinal    HYSTERECTOMY  1983    vag hyst /LSO      INCISION AND DRAINAGE OF ABSCESS Left 12/24/2021    Procedure: INCISION AND DRAINAGE, ABSCESS;  Surgeon: Joseph Longo MD;  Location: [facility] OR;  Service: General;  Laterality: Left;    INJECTION OF ANESTHETIC AGENT AROUND MEDIAL BRANCH NERVES INNERVATING LUMBAR FACET JOINT Right 10/19/2022    Procedure: Right L4/L5 and L5/S1 MBB;  Surgeon: Jassi Pierre MD;  Location: [facility] PAIN MGT;  Service: Pain Management;  Laterality: Right;    INJECTION OF ANESTHETIC AGENT AROUND MEDIAL BRANCH NERVES INNERVATING LUMBAR FACET JOINT Right 11/09/2022    Procedure: Right L4/L5 and L5/S1 MBB;  Surgeon: Jassi Pierre MD;  Location: [facility]GT;  Service: Pain Management;  Laterality: Right;    INJECTION OF ANESTHETIC AGENT INTO SACROILIAC JOINT Right 08/22/2022    Procedure: Right SIJ Injection Right L5/S1 Facte Injection;  Surgeon: Jassi Pierre MD;  Location: [facility]GT;  Service: Pain Management;  Laterality: Right;    INSERTION OF TUNNELED CENTRAL VENOUS CATHETER (CVC) WITH SUBCUTANEOUS PORT N/A 11/09/2021    Procedure: [id number];  Surgeon: Christoph Douglas MD;  Location: [facility] OR;  Service: General;  Laterality: N/A;    RADIOFREQUENCY THERMOCOAGULATION Right 12/7/2022    Procedure: Right L4/L5 and L5/S1 Lumbar RFA;  Surgeon: Jassi Pierre MD;  Location: [facility];  Service: Pain Management;  Laterality: Right;    REMOVAL OF VASCULAR ACCESS PORT      SENTINEL LYMPH NODE BIOPSY Left 10/12/2021    Procedure: BIOPSY, LYMPH NODE, SENTINEL;  Surgeon: Christoph Douglas MD;  Location: [facility] OR;  Service: General;  Laterality: Left;    TOE SURGERY       Family History   Problem Relation Age of Onset    Cancer Mother 38        breast    Breast cancer Mother     Breast cancer Maternal Grandmother     Heart disease Maternal Grandmother     Hypertension Son     Cataracts Cousin     Diabetes Neg Hx     Stroke Neg Hx     Kidney disease Neg Hx     Asthma Neg Hx     COPD Neg Hx     Melanoma Neg Hx      Hyperlipidemia Neg Hx      Social History     Socioeconomic History    Marital status: Single    Number of children: 2    Highest education level: 11th grade   Occupational History    Occupation: Retired   Tobacco Use    Smoking status: Never    Smokeless tobacco: Never   Substance and Sexual Activity    Alcohol use: Never     Alcohol/week: 0.0 standard drinks    Drug use: No    Sexual activity: Not Currently     Partners: Male     Birth control/protection: See Surgical Hx   Other Topics Concern    Are you pregnant or think you may be? No    Breast-feeding No   Social History Narrative    Single. 2 children , 1  at 30 yo2014 strep throat -  pneumonia and renal complications after not completing course of AB. Other child lives in [place], [place]. Has a cousin locally that could help in an emergency. 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 Caffeine intake =- 1 cola a day. No coffee, + occasional tea, avoids caffeine especially at night. Still drives. She does not have a Living Will or Advanced directive.      Patient Active Problem List   Diagnosis    Heart transplanted    Anxiety    Insomnia    CKD (chronic kidney disease) stage 4, GFR 15-29 ml/min    Immunosuppression due to drug therapy    Fibromyalgia    Gastroesophageal reflux disease without esophagitis    Breast screening    Immunization deficiency    Essential hypertension    Aortic atherosclerosis    Chronic major depressive disorder, recurrent episode    Iron deficiency anemia    Vaginal atrophy    Hyperparathyroidism    Hx of falling    Chronic midline low back pain without sciatica    Lightheadedness    Medication side effects    Obesity (BMI 30.0-34.9)    Edema    Asymptomatic menopausal state     Other osteoporosis without current pathological fracture    Cough    Abnormal CT scan, kidney    Weakness of both lower extremities    Immunocompromised patient    Osteoporosis, post-menopausal    Ductal carcinoma in situ  (DCIS) of left breast    Immunodeficiency secondary to radiation therapy    The hangs, uncomplicated    GRACE (acute kidney injury)    Diarrhea    Thrombocytopenia, unspecified    Immunodeficiency due to chemotherapy    Strain of left shoulder    Left shoulder pain    Ulnar neuropathy of left upper extremity    Anemia associated with stage 4 chronic renal failure    Secondary and unspecified malignant neoplasm of axilla and upper limb lymph nodes    Other chest pain    Abnormal thyroid function test    Hypomagnesemia    Slow transit constipation    Other hyperlipidemia    Sacroiliac joint pain    Impaired mobility    Cyst of right breast    Leukopenia    Sacroiliitis    Right upper quadrant abdominal pain    DDD (degenerative disc disease), thoracolumbar    Ventral hernia without obstruction or gangrene     Review of patient's allergies indicates:   Allergen Reactions    Lisinopril Swelling and Rash    Augmentin [amoxicillin-pot clavulanate] Diarrhea       Medications:  Current Outpatient Medications on File Prior to Visit   Medication Sig Dispense Refill    amLODIPine (NORVASC) 2.5 MG tablet Take 1 tablet (2.5 mg total) by mouth once daily. 90 tablet 3    aspirin (ECOTRIN) 81 MG EC tablet Take 1 tablet (81 mg total) by mouth once daily. 90 tablet 3    biotin 10,000 mcg Cap Take 1 tablet by mouth once daily.      carvediloL (COREG) 6.25 MG tablet Take 1 tablet (6.25 mg total) by mouth 2 (two) times daily with meals. 180 tablet 3    cycloSPORINE modified, NEORAL, (NEORAL) 25 MG capsule TAKE 3 CAPSULES (75 MG TOTAL) BY MOUTH 2 (TWO) TIMES DAILY. 540 capsule 6    DULoxetine (CYMBALTA) 30 MG capsule TAKE 1 CAPSULE EVERY DAY 90 capsule 1    EVENING PRIMROSE OIL ORAL Take 1,000 mg by mouth once daily.      furosemide (LASIX) 20 MG tablet Take 1 tablet (20 mg total) by mouth 2 (two) times a day for 5 days, THEN 1 tablet (20 mg total) once daily. Take 1 tablet daily. 370 tablet 0    hydrALAZINE (APRESOLINE) 50 MG tablet Take  1 tablet (50 mg total) by mouth every 8 (eight) hours. TAKE 1 TABLETS  EVERY 8  HOURS. 270 tablet 3    multivitamin capsule Take 1 capsule by mouth once daily.      pantoprazole (PROTONIX) 40 MG tablet TAKE 1 TABLET EVERY DAY 90 tablet 1    pregabalin (LYRICA) 50 MG capsule TAKE 1 CAPSULE 2 TIMES DAILY AT NOON AND NIGHT TIME 180 capsule 1    RETACRIT 20,000 unit/mL injection       temazepam (RESTORIL) 30 mg capsule TAKE 1 CAPSULE AT BEDTIME 30 capsule 5    vitamin E 400 UNIT capsule Take 400 Units by mouth once daily.      zolpidem (AMBIEN) 10 mg Tab TAKE 1 TABLET EVERY EVENING 90 tablet 0    [DISCONTINUED] busPIRone (BUSPAR) 10 MG tablet Take 1 tablet (10 mg total) by mouth once daily. 90 tablet 3    [DISCONTINUED] calcitonin, salmon, (FORTICAL) 200 unit/actuation nasal spray 1 spray by Nasal route once daily. 3 each 3    tiZANidine (ZANAFLEX) 4 MG tablet Take 1 tablet (4 mg total) by mouth 2 (two) times daily as needed. 180 tablet 0     Current Facility-Administered Medications on File Prior to Visit   Medication Dose Route Frequency Provider Last Rate Last Admin    denosumab (PROLIA) injection 60 mg  60 mg Subcutaneous Q6 Months Prasanth Johnson MD        lactated ringers infusion   Intravenous Continuous Jennifer Carr MD 10 mL/hr at 11/09/21 0717 Restarted at 11/09/21 0820       Medications have been reviewed and reconciled with patient at visit today.    Barriers to medications present (no )    Fall since last office visit (no )      Exam:  Vitals:    01/04/23 1027   BP: 134/76   Pulse: 88   Temp: 98.2 °F (36.8 °C)         Body mass index is 30.58 kg/m².      BP Readings from Last 3 Encounters:   01/04/23 134/76   12/07/22 117/67   12/05/22 124/80     Wt Readings from Last 3 Encounters:   12/07/22 0909 75.8 kg (167 lb 3.5 oz)   12/05/22 1020 76.5 kg (168 lb 9.6 oz)   12/01/22 1457 77.5 kg (170 lb 14.4 oz)            Physical Exam  Constitutional:       Comments: Very uncomfortable   HENT:      Head:  Normocephalic.      Mouth/Throat:      Mouth: Mucous membranes are moist.   Eyes:      General: No scleral icterus.  Cardiovascular:      Rate and Rhythm: Normal rate and regular rhythm.   Pulmonary:      Effort: Pulmonary effort is normal.      Breath sounds: Normal breath sounds.   Abdominal:      General: There is no distension.      Palpations: Abdomen is soft.      Tenderness: There is no abdominal tenderness.   Musculoskeletal:        Arms:       Comments: Area of reproduced pain   Skin:     General: Skin is warm and dry.   Neurological:      Mental Status: She is alert. Mental status is at baseline.       Laboratory Reviewed: (Yes)  Lab Results   Component Value Date    WBC 3.60 (L) 01/04/2023    HGB 9.3 (L) 01/04/2023    HCT 28.5 (L) 01/04/2023     01/04/2023    CHOL 193 10/04/2022    TRIG 159 (H) 10/04/2022    HDL 46 10/04/2022    ALT 21 01/04/2023    AST 36 01/04/2023     01/04/2023    K 4.3 01/04/2023     01/04/2023    CREATININE 2.7 (H) 01/04/2023    BUN 31 (H) 01/04/2023    CO2 20 (L) 01/04/2023    TSH 5.231 (H) 06/14/2022    PSA <0.1 05/27/2008    INR 1.0 11/22/2021    HGBA1C 5.2 12/24/2021           Health Maintenance  Health Maintenance Topics with due status: Not Due       Topic Last Completion Date    Pneumococcal Vaccines (Age 65+) 10/22/2018    TETANUS VACCINE 09/09/2020    Colorectal Cancer Screening 02/25/2021    DEXA Scan 08/03/2021    Lipid Panel 10/04/2022    Mammogram 11/28/2022     Health Maintenance Due   Topic Date Due    COVID-19 Vaccine (3 - Moderna risk series) 08/10/2022       Assessment:  Problem List Items Addressed This Visit          Neuro    DDD (degenerative disc disease), thoracolumbar     Suspect cause of severe pain.   On max renal dose of Lyrica.   Norco for acute pain.   Message to pain mgmt.             Psychiatric    Anxiety (Chronic)    Relevant Medications    busPIRone (BUSPAR) 10 MG tablet    Chronic major depressive disorder, recurrent episode        Cardiac/Vascular    Heart transplanted (Chronic)     Continues follow up with transplant clinic.         Aortic atherosclerosis       Immunology/Multi System    Immunosuppression due to drug therapy       Hematology    Thrombocytopenia, unspecified       Endocrine    Hyperparathyroidism       GI    Ventral hernia without obstruction or gangrene     LFTs okay.  Most prominent pain rt mid axillary area at base of ribs.   Message to surgeon.         Right upper quadrant abdominal pain     CT reviewed.   Suspect radicular pain.   On maximum renal dose of Lyrica.  Message to Dr Pierre.           Relevant Orders    CT Renal Stone Study ABD Pelvis WO (Completed)       Orthopedic    Sacroiliitis - Primary    Other osteoporosis without current pathological fracture    Relevant Medications    calcitonin, salmon, (FORTICAL) 200 unit/actuation nasal spray     Other Visit Diagnoses       Stage 4 chronic kidney disease        Flank pain        Relevant Medications    HYDROcodone-acetaminophen (NORCO) 7.5-325 mg per tablet    Other Relevant Orders    Urinalysis, Reflex to Urine Culture Urine, Clean Catch    POCT URINE DIPSTICK WITHOUT MICROSCOPE (Completed)    X-Ray Ribs 3 Views Bilateral (Completed)    CBC Auto Differential (Completed)    RENAL FUNCTION PANEL (Completed)    Hepatic Function Panel (Completed)              Plan:  Sacroiliitis    Anxiety  -     busPIRone (BUSPAR) 10 MG tablet; Take 1 tablet (10 mg total) by mouth once daily.  Dispense: 90 tablet; Refill: 3    Other osteoporosis without current pathological fracture  -     calcitonin, salmon, (FORTICAL) 200 unit/actuation nasal spray; 1 spray by Nasal route once daily.  Dispense: 3 each; Refill: 3    Hyperparathyroidism    Immunosuppression due to drug therapy    Thrombocytopenia, unspecified    Chronic major depressive disorder, recurrent episode    Aortic atherosclerosis    Stage 4 chronic kidney disease    Flank pain  -     Urinalysis, Reflex to Urine  Culture Urine, Clean Catch; Future; Expected date: 01/04/2023  -     POCT URINE DIPSTICK WITHOUT MICROSCOPE  -     X-Ray Ribs 3 Views Bilateral; Future; Expected date: 01/04/2023  -     CBC Auto Differential; Future; Expected date: 01/04/2023  -     RENAL FUNCTION PANEL; Future; Expected date: 01/04/2023  -     Hepatic Function Panel; Future; Expected date: 01/04/2023  -     HYDROcodone-acetaminophen (NORCO) 7.5-325 mg per tablet; Take 1 tablet by mouth every 6 (six) hours as needed for Pain (acute right flank pain).  Dispense: 15 tablet; Refill: 0    Right upper quadrant abdominal pain  -     CT Renal Stone Study ABD Pelvis WO; Future; Expected date: 01/04/2023    DDD (degenerative disc disease), thoracolumbar    Ventral hernia without obstruction or gangrene    Heart transplanted      -Patient's lab results were reviewed and discussed with patient  -Treatment options and alternatives were discussed with the patient. Patient expressed understanding. Patient was given the opportunity to ask questions and be an active participant in their medical care. Patient had no further questions or concerns at this time.   -Documentation of patient's health and condition was obtained from family member who was present during visit.  -Patient is an overall moderate risk for health complications from their medical conditions.       Follow up: Follow up in about 1 day (around 1/5/2023).      After visit summary printed and given to patient upon discharge.  Patient goals and care plan are included in After visit summary.    Total medical decision making time was 40 min.  The following issues were discussed: The primary encounter diagnosis was Sacroiliitis. Diagnoses of Anxiety, Other osteoporosis without current pathological fracture, Hyperparathyroidism, Immunosuppression due to drug therapy, Thrombocytopenia, unspecified, Chronic major depressive disorder, recurrent episode, Aortic atherosclerosis, Stage 4 chronic kidney disease,  Flank pain, Right upper quadrant abdominal pain, DDD (degenerative disc disease), thoracolumbar, Ventral hernia without obstruction or gangrene, and Heart transplanted were also pertinent to this visit.    Health maintenance needs, recent test results and goals of care discussed with pt and questions answered.

## 2023-01-04 NOTE — ASSESSMENT & PLAN NOTE
CT reviewed.   Suspect radicular pain.   On maximum renal dose of Lyrica.  Message to Dr Pierre.

## 2023-01-04 NOTE — Clinical Note
Hi - I saw Ms Zuniga today for severe Rt flank pain radiating to RUQ. No injury. CT abd/pelvis shows advanced DDD specifically at T11/T12. Seems radicular. She's on max renal dose of Lyrica. Her pain was 10/10 during our visit and she could barely sit d/t pain. Other recommendations? Would you have time to see her soon?

## 2023-01-04 NOTE — Clinical Note
Good afternoon - Ms Damon has an incidental supraumbilical hernia with slight liver protrusion on CT abd/pelvis today. No pain umbilicus and I didn't appreciate the hernia on exam today. LFTs okay. I am unclear of the significance of liver protrusion. Any recommendations?

## 2023-01-05 ENCOUNTER — OFFICE VISIT (OUTPATIENT)
Dept: RHEUMATOLOGY | Facility: CLINIC | Age: 68
End: 2023-01-05
Payer: MEDICARE

## 2023-01-05 ENCOUNTER — OFFICE VISIT (OUTPATIENT)
Dept: PRIMARY CARE CLINIC | Facility: CLINIC | Age: 68
End: 2023-01-05
Payer: MEDICARE

## 2023-01-05 VITALS
OXYGEN SATURATION: 99 % | TEMPERATURE: 99 F | DIASTOLIC BLOOD PRESSURE: 72 MMHG | SYSTOLIC BLOOD PRESSURE: 130 MMHG | BODY MASS INDEX: 30.61 KG/M2 | HEART RATE: 91 BPM | HEIGHT: 62 IN | WEIGHT: 166.31 LBS

## 2023-01-05 VITALS
SYSTOLIC BLOOD PRESSURE: 121 MMHG | HEIGHT: 62 IN | WEIGHT: 167.13 LBS | BODY MASS INDEX: 30.76 KG/M2 | HEART RATE: 95 BPM | DIASTOLIC BLOOD PRESSURE: 82 MMHG

## 2023-01-05 DIAGNOSIS — K43.9 VENTRAL HERNIA WITHOUT OBSTRUCTION OR GANGRENE: ICD-10-CM

## 2023-01-05 DIAGNOSIS — M54.50 CHRONIC LOW BACK PAIN, UNSPECIFIED BACK PAIN LATERALITY, UNSPECIFIED WHETHER SCIATICA PRESENT: ICD-10-CM

## 2023-01-05 DIAGNOSIS — M51.35 DDD (DEGENERATIVE DISC DISEASE), THORACOLUMBAR: Primary | ICD-10-CM

## 2023-01-05 DIAGNOSIS — Z71.89 COUNSELING ON HEALTH PROMOTION AND DISEASE PREVENTION: ICD-10-CM

## 2023-01-05 DIAGNOSIS — M15.9 PRIMARY OSTEOARTHRITIS INVOLVING MULTIPLE JOINTS: ICD-10-CM

## 2023-01-05 DIAGNOSIS — M81.0 OSTEOPOROSIS, UNSPECIFIED OSTEOPOROSIS TYPE, UNSPECIFIED PATHOLOGICAL FRACTURE PRESENCE: Primary | ICD-10-CM

## 2023-01-05 DIAGNOSIS — G89.29 CHRONIC LOW BACK PAIN, UNSPECIFIED BACK PAIN LATERALITY, UNSPECIFIED WHETHER SCIATICA PRESENT: ICD-10-CM

## 2023-01-05 LAB
BACTERIA #/AREA URNS AUTO: NORMAL /HPF
BILIRUB UR QL STRIP: NEGATIVE
CLARITY UR REFRACT.AUTO: CLEAR
COLOR UR AUTO: YELLOW
GLUCOSE UR QL STRIP: NEGATIVE
HGB UR QL STRIP: NEGATIVE
HYALINE CASTS UR QL AUTO: 0 /LPF
KETONES UR QL STRIP: NEGATIVE
LEUKOCYTE ESTERASE UR QL STRIP: NEGATIVE
MICROSCOPIC COMMENT: NORMAL
NITRITE UR QL STRIP: NEGATIVE
PH UR STRIP: 6 [PH] (ref 5–8)
PROT UR QL STRIP: ABNORMAL
RBC #/AREA URNS AUTO: 1 /HPF (ref 0–4)
SP GR UR STRIP: 1.01 (ref 1–1.03)
URN SPEC COLLECT METH UR: ABNORMAL
WBC #/AREA URNS AUTO: 0 /HPF (ref 0–5)

## 2023-01-05 PROCEDURE — 3288F FALL RISK ASSESSMENT DOCD: CPT | Mod: HCNC,CPTII,S$GLB, | Performed by: NURSE PRACTITIONER

## 2023-01-05 PROCEDURE — 3008F PR BODY MASS INDEX (BMI) DOCUMENTED: ICD-10-PCS | Mod: HCNC,CPTII,S$GLB, | Performed by: NURSE PRACTITIONER

## 2023-01-05 PROCEDURE — 99999 PR PBB SHADOW E&M-EST. PATIENT-LVL V: CPT | Mod: PBBFAC,HCNC,, | Performed by: NURSE PRACTITIONER

## 2023-01-05 PROCEDURE — 99999 PR PBB SHADOW E&M-EST. PATIENT-LVL IV: ICD-10-PCS | Mod: PBBFAC,HCNC,, | Performed by: INTERNAL MEDICINE

## 2023-01-05 PROCEDURE — 3075F SYST BP GE 130 - 139MM HG: CPT | Mod: HCNC,CPTII,S$GLB, | Performed by: NURSE PRACTITIONER

## 2023-01-05 PROCEDURE — 99213 PR OFFICE/OUTPT VISIT, EST, LEVL III, 20-29 MIN: ICD-10-PCS | Mod: HCNC,S$GLB,, | Performed by: NURSE PRACTITIONER

## 2023-01-05 PROCEDURE — 1157F ADVNC CARE PLAN IN RCRD: CPT | Mod: HCNC,CPTII,S$GLB, | Performed by: NURSE PRACTITIONER

## 2023-01-05 PROCEDURE — 1157F PR ADVANCE CARE PLAN OR EQUIV PRESENT IN MEDICAL RECORD: ICD-10-PCS | Mod: HCNC,CPTII,S$GLB, | Performed by: NURSE PRACTITIONER

## 2023-01-05 PROCEDURE — 1157F ADVNC CARE PLAN IN RCRD: CPT | Mod: HCNC,CPTII,S$GLB, | Performed by: INTERNAL MEDICINE

## 2023-01-05 PROCEDURE — 3075F PR MOST RECENT SYSTOLIC BLOOD PRESS GE 130-139MM HG: ICD-10-PCS | Mod: HCNC,CPTII,S$GLB, | Performed by: NURSE PRACTITIONER

## 2023-01-05 PROCEDURE — 1101F PR PT FALLS ASSESS DOC 0-1 FALLS W/OUT INJ PAST YR: ICD-10-PCS | Mod: HCNC,CPTII,S$GLB, | Performed by: INTERNAL MEDICINE

## 2023-01-05 PROCEDURE — 99214 OFFICE O/P EST MOD 30 MIN: CPT | Mod: HCNC,S$GLB,, | Performed by: INTERNAL MEDICINE

## 2023-01-05 PROCEDURE — 1157F PR ADVANCE CARE PLAN OR EQUIV PRESENT IN MEDICAL RECORD: ICD-10-PCS | Mod: HCNC,CPTII,S$GLB, | Performed by: INTERNAL MEDICINE

## 2023-01-05 PROCEDURE — 3288F PR FALLS RISK ASSESSMENT DOCUMENTED: ICD-10-PCS | Mod: HCNC,CPTII,S$GLB, | Performed by: NURSE PRACTITIONER

## 2023-01-05 PROCEDURE — 3074F PR MOST RECENT SYSTOLIC BLOOD PRESSURE < 130 MM HG: ICD-10-PCS | Mod: HCNC,CPTII,S$GLB, | Performed by: INTERNAL MEDICINE

## 2023-01-05 PROCEDURE — 3008F BODY MASS INDEX DOCD: CPT | Mod: HCNC,CPTII,S$GLB, | Performed by: NURSE PRACTITIONER

## 2023-01-05 PROCEDURE — 99213 OFFICE O/P EST LOW 20 MIN: CPT | Mod: HCNC,S$GLB,, | Performed by: NURSE PRACTITIONER

## 2023-01-05 PROCEDURE — 3008F BODY MASS INDEX DOCD: CPT | Mod: HCNC,CPTII,S$GLB, | Performed by: INTERNAL MEDICINE

## 2023-01-05 PROCEDURE — 1159F MED LIST DOCD IN RCRD: CPT | Mod: HCNC,CPTII,S$GLB, | Performed by: NURSE PRACTITIONER

## 2023-01-05 PROCEDURE — 3008F PR BODY MASS INDEX (BMI) DOCUMENTED: ICD-10-PCS | Mod: HCNC,CPTII,S$GLB, | Performed by: INTERNAL MEDICINE

## 2023-01-05 PROCEDURE — 1125F AMNT PAIN NOTED PAIN PRSNT: CPT | Mod: HCNC,CPTII,S$GLB, | Performed by: INTERNAL MEDICINE

## 2023-01-05 PROCEDURE — 3288F PR FALLS RISK ASSESSMENT DOCUMENTED: ICD-10-PCS | Mod: HCNC,CPTII,S$GLB, | Performed by: INTERNAL MEDICINE

## 2023-01-05 PROCEDURE — 1101F PT FALLS ASSESS-DOCD LE1/YR: CPT | Mod: HCNC,CPTII,S$GLB, | Performed by: NURSE PRACTITIONER

## 2023-01-05 PROCEDURE — 1101F PT FALLS ASSESS-DOCD LE1/YR: CPT | Mod: HCNC,CPTII,S$GLB, | Performed by: INTERNAL MEDICINE

## 2023-01-05 PROCEDURE — 3079F DIAST BP 80-89 MM HG: CPT | Mod: HCNC,CPTII,S$GLB, | Performed by: INTERNAL MEDICINE

## 2023-01-05 PROCEDURE — 3074F SYST BP LT 130 MM HG: CPT | Mod: HCNC,CPTII,S$GLB, | Performed by: INTERNAL MEDICINE

## 2023-01-05 PROCEDURE — 1159F MED LIST DOCD IN RCRD: CPT | Mod: HCNC,CPTII,S$GLB, | Performed by: INTERNAL MEDICINE

## 2023-01-05 PROCEDURE — 3288F FALL RISK ASSESSMENT DOCD: CPT | Mod: HCNC,CPTII,S$GLB, | Performed by: INTERNAL MEDICINE

## 2023-01-05 PROCEDURE — 99214 PR OFFICE/OUTPT VISIT, EST, LEVL IV, 30-39 MIN: ICD-10-PCS | Mod: HCNC,S$GLB,, | Performed by: INTERNAL MEDICINE

## 2023-01-05 PROCEDURE — 1101F PR PT FALLS ASSESS DOC 0-1 FALLS W/OUT INJ PAST YR: ICD-10-PCS | Mod: HCNC,CPTII,S$GLB, | Performed by: NURSE PRACTITIONER

## 2023-01-05 PROCEDURE — 3079F PR MOST RECENT DIASTOLIC BLOOD PRESSURE 80-89 MM HG: ICD-10-PCS | Mod: HCNC,CPTII,S$GLB, | Performed by: INTERNAL MEDICINE

## 2023-01-05 PROCEDURE — 3078F PR MOST RECENT DIASTOLIC BLOOD PRESSURE < 80 MM HG: ICD-10-PCS | Mod: HCNC,CPTII,S$GLB, | Performed by: NURSE PRACTITIONER

## 2023-01-05 PROCEDURE — 99999 PR PBB SHADOW E&M-EST. PATIENT-LVL V: ICD-10-PCS | Mod: PBBFAC,HCNC,, | Performed by: NURSE PRACTITIONER

## 2023-01-05 PROCEDURE — 99999 PR PBB SHADOW E&M-EST. PATIENT-LVL IV: CPT | Mod: PBBFAC,HCNC,, | Performed by: INTERNAL MEDICINE

## 2023-01-05 PROCEDURE — 1125F PR PAIN SEVERITY QUANTIFIED, PAIN PRESENT: ICD-10-PCS | Mod: HCNC,CPTII,S$GLB, | Performed by: INTERNAL MEDICINE

## 2023-01-05 PROCEDURE — 1159F PR MEDICATION LIST DOCUMENTED IN MEDICAL RECORD: ICD-10-PCS | Mod: HCNC,CPTII,S$GLB, | Performed by: INTERNAL MEDICINE

## 2023-01-05 PROCEDURE — 1159F PR MEDICATION LIST DOCUMENTED IN MEDICAL RECORD: ICD-10-PCS | Mod: HCNC,CPTII,S$GLB, | Performed by: NURSE PRACTITIONER

## 2023-01-05 PROCEDURE — 3078F DIAST BP <80 MM HG: CPT | Mod: HCNC,CPTII,S$GLB, | Performed by: NURSE PRACTITIONER

## 2023-01-05 RX ORDER — LIDOCAINE 50 MG/G
2 PATCH TOPICAL DAILY
Qty: 60 PATCH | Refills: 2 | Status: SHIPPED | OUTPATIENT
Start: 2023-01-05 | End: 2023-03-14 | Stop reason: SDUPTHER

## 2023-01-05 NOTE — PROGRESS NOTES
RHEUMATOLOGY OUTPATIENT CLINIC NOTE    1/5/2023    Attending Rheumatologist: Prasanth Johnson  Primary Care Provider/Physician Requesting Consultation: Luz Dickson NP   Chief Complaint/Reason For Consultation:  Osteoporosis      Subjective:     Nadia Damon is a 67 y.o. Black or  female with OP for f/u visit.    Main concern of chronic back pain.    Review of Systems   Constitutional:  Negative for fever.   Eyes:  Negative for photophobia and pain.   Gastrointestinal:  Negative for blood in stool.   Musculoskeletal:  Positive for back pain. Negative for falls.   Skin:  Negative for rash.     Chronic comorbid conditions affecting medical decision making today:  Past Medical History:   Diagnosis Date    Abdominal wall hernia     CT Renal 6/11/2018---Small fat containing superior ventral abdominal wall hernia at the epicardial pacing lead site.    Abnormal mammogram 10/12/2021    Anxiety     Arthritis     ZEN HIPS    Breast cancer in female 08/2021    LEFT BREAST    C. difficile colitis 11/29/2021    Cellulitis of axilla, left 12/23/2021    Chronic diastolic heart failure 12/16/2021    Chronic kidney disease     stage 4, GFR 15-29 ml/min    Chronic midline low back pain without sciatica 06/18/2018    Closed nondisplaced fracture of distal phalanx of left great toe with routine healing 10/22/2018    Coronary artery disease 1993    heart transplant    Cystitis 5/10/2022    Depression     Fibromyalgia     on Lyrica    Heart failure     native heart cardiomyopathy    Heart transplanted 1993    due to cardiomyopathy    History of hyperparathyroidism; Hyperparathyroidism, secondary renal     PT DENIES    Hypertension     Immune disorder     anti rejection meds    Iron deficiency anemia 08/15/2017    Kidney stones     passed per pt    Obesity     Other osteoporosis without current pathological fracture 08/30/2019    Severe sepsis 11/22/2021    Shingles 2003 approx    left leg    Trouble in sleeping      Urinary incontinence      Past Surgical History:   Procedure Laterality Date    BLADDER SURGERY  2015 approx    mesh - Dr Everett then 2nd reconstructive sx Dr Onofre    BREAST BIOPSY Bilateral     NEGATIVE    BREAST BIOPSY Right 10/31/2022    benign    BREAST LUMPECTOMY Left 2021    BREAST SURGERY Left 09/28/2015    Bx - benign    BREAST SURGERY Right 12/2015    Bx benign    CARDIAC PACEMAKER REMOVAL Left 06/26/2014    Pacer defirillator removed. Put in 1993 aat time of heart transplant    CARPAL TUNNEL RELEASE Left 03/03/2015    Dr. Hall    COLONOSCOPY N/A 02/25/2021    Procedure: COLONOSCOPY;  Surgeon: Freida Ramirez MD;  Location: [facility] ENDO;  Service: Endoscopy;  Laterality: N/A;    HEART TRANSPLANT  1993    HERNIA REPAIR Right 1971 approx    Inguinal    HYSTERECTOMY  1983    vag hyst /LSO     INCISION AND DRAINAGE OF ABSCESS Left 12/24/2021    Procedure: INCISION AND DRAINAGE, ABSCESS;  Surgeon: Joseph Longo MD;  Location: [facility] OR;  Service: General;  Laterality: Left;    INJECTION OF ANESTHETIC AGENT AROUND MEDIAL BRANCH NERVES INNERVATING LUMBAR FACET JOINT Right 10/19/2022    Procedure: Right L4/L5 and L5/S1 MBB;  Surgeon: Jassi Pierre MD;  Location: [facility] PAIN MGT;  Service: Pain Management;  Laterality: Right;    INJECTION OF ANESTHETIC AGENT AROUND MEDIAL BRANCH NERVES INNERVATING LUMBAR FACET JOINT Right 11/09/2022    Procedure: Right L4/L5 and L5/S1 MBB;  Surgeon: Jassi Pierre MD;  Location: [facility] PAIN MGT;  Service: Pain Management;  Laterality: Right;    INJECTION OF ANESTHETIC AGENT INTO SACROILIAC JOINT Right 08/22/2022    Procedure: Right SIJ Injection Right L5/S1 Facte Injection;  Surgeon: Jassi Pierre MD;  Location: [facility] PAIN MGT;  Service: Pain Management;  Laterality: Right;    INSERTION OF TUNNELED CENTRAL VENOUS CATHETER (CVC) WITH SUBCUTANEOUS PORT N/A 11/09/2021    Procedure: [id number];  Surgeon: Christoph Douglas MD;  Location: [facility] OR;  Service: General;   Laterality: N/A;    RADIOFREQUENCY THERMOCOAGULATION Right 12/7/2022    Procedure: Right L4/L5 and L5/S1 Lumbar RFA;  Surgeon: Jassi Pierre MD;  Location: [facility] PAIN MGT;  Service: Pain Management;  Laterality: Right;    REMOVAL OF VASCULAR ACCESS PORT      SENTINEL LYMPH NODE BIOPSY Left 10/12/2021    Procedure: BIOPSY, LYMPH NODE, SENTINEL;  Surgeon: Christoph Douglas MD;  Location: [facility] OR;  Service: General;  Laterality: Left;    TOE SURGERY       Family History   Problem Relation Age of Onset    Cancer Mother 38        breast    Breast cancer Mother     Breast cancer Maternal Grandmother     Heart disease Maternal Grandmother     Hypertension Son     Cataracts Cousin     Diabetes Neg Hx     Stroke Neg Hx     Kidney disease Neg Hx     Asthma Neg Hx     COPD Neg Hx     Melanoma Neg Hx     Hyperlipidemia Neg Hx      Social History     Tobacco Use   Smoking Status Never   Smokeless Tobacco Never       Current Outpatient Medications:     amLODIPine (NORVASC) 2.5 MG tablet, Take 1 tablet (2.5 mg total) by mouth once daily., Disp: 90 tablet, Rfl: 3    aspirin (ECOTRIN) 81 MG EC tablet, Take 1 tablet (81 mg total) by mouth once daily., Disp: 90 tablet, Rfl: 3    biotin 10,000 mcg Cap, Take 1 tablet by mouth once daily., Disp: , Rfl:     busPIRone (BUSPAR) 10 MG tablet, Take 1 tablet (10 mg total) by mouth once daily., Disp: 90 tablet, Rfl: 3    calcitonin, salmon, (FORTICAL) 200 unit/actuation nasal spray, 1 spray by Nasal route once daily., Disp: 3 each, Rfl: 3    carvediloL (COREG) 6.25 MG tablet, Take 1 tablet (6.25 mg total) by mouth 2 (two) times daily with meals., Disp: 180 tablet, Rfl: 3    cycloSPORINE modified, NEORAL, (NEORAL) 25 MG capsule, TAKE 3 CAPSULES (75 MG TOTAL) BY MOUTH 2 (TWO) TIMES DAILY., Disp: 540 capsule, Rfl: 6    DULoxetine (CYMBALTA) 30 MG capsule, TAKE 1 CAPSULE EVERY DAY, Disp: 90 capsule, Rfl: 1    EVENING PRIMROSE OIL ORAL, Take 1,000 mg by mouth once daily., Disp: , Rfl:      hydrALAZINE (APRESOLINE) 50 MG tablet, Take 1 tablet (50 mg total) by mouth every 8 (eight) hours. TAKE 1 TABLETS  EVERY 8  HOURS., Disp: 270 tablet, Rfl: 3    HYDROcodone-acetaminophen (NORCO) 7.5-325 mg per tablet, Take 1 tablet by mouth every 6 (six) hours as needed for Pain (acute right flank pain)., Disp: 15 tablet, Rfl: 0    multivitamin capsule, Take 1 capsule by mouth once daily., Disp: , Rfl:     pantoprazole (PROTONIX) 40 MG tablet, TAKE 1 TABLET EVERY DAY, Disp: 90 tablet, Rfl: 1    pregabalin (LYRICA) 50 MG capsule, TAKE 1 CAPSULE 2 TIMES DAILY AT NOON AND NIGHT TIME, Disp: 180 capsule, Rfl: 1    RETACRIT 20,000 unit/mL injection, , Disp: , Rfl:     temazepam (RESTORIL) 30 mg capsule, TAKE 1 CAPSULE AT BEDTIME, Disp: 30 capsule, Rfl: 5    tiZANidine (ZANAFLEX) 4 MG tablet, Take 1 tablet (4 mg total) by mouth 2 (two) times daily as needed., Disp: 180 tablet, Rfl: 0    vitamin E 400 UNIT capsule, Take 400 Units by mouth once daily., Disp: , Rfl:     zolpidem (AMBIEN) 10 mg Tab, TAKE 1 TABLET EVERY EVENING, Disp: 90 tablet, Rfl: 0    furosemide (LASIX) 20 MG tablet, Take 1 tablet (20 mg total) by mouth 2 (two) times a day for 5 days, THEN 1 tablet (20 mg total) once daily. Take 1 tablet daily., Disp: 370 tablet, Rfl: 0    Current Facility-Administered Medications:     denosumab (PROLIA) injection 60 mg, 60 mg, Subcutaneous, Q6 Months, Prasanth Johnson MD    Facility-Administered Medications Ordered in Other Visits:     lactated ringers infusion, , Intravenous, Continuous, Jennifer Carr MD, Last Rate: 10 mL/hr at 11/09/21 0717, Restarted at 11/09/21 0820     Objective:     Vitals:    01/05/23 1112   BP: 121/82   Pulse: 95     Physical Exam   Eyes: Conjunctivae are normal.   Pulmonary/Chest: Effort normal. No respiratory distress.   Musculoskeletal:         General: No swelling or tenderness. Normal range of motion.   Neurological: She displays no weakness.   Skin: No rash noted.     Reviewed available old  and all outside pertinent medical records available.    All lab results personally reviewed and interpreted by me.       ASSESSMENT:     Osteoporosis    Hx of breast Ca s/p chem/radiation    Chronic back pain    Medication monitoring encounter    Other specified counseling    Positive JONNATHAN    CKD    PLAN:     Chronic back pain with mechanical pattern. No past fragility fractures.  Last Dexa with osteopenic value L2, significantly improved compared to previous.  On nasal calcitonin. Kidney function stable w/o recent hypocalcemia.  Imaging w/o evidence of compression / fragility fractures.  Repeat DXA to reassess fragility fracture risk and need to get back on Prolia (9/2019-7/2021).  C/w nasal calcitonin and adequate Ca/Vit D.      Disclaimer: This note is prepared using voice recognition software and as such is likely to have errors and has not been proof read. Please contact me for questions.         Prasanth Johnson M.D.

## 2023-01-05 NOTE — PROGRESS NOTES
Nadia Damon  01/05/2023  [id number]    Luz Dickson NP  Patient Care Team:  Luz Dickson NP as PCP - General (Family Medicine)  Miguel Soni Jr., MD as Consulting Physician (Vascular Surgery)  Ike King MD as Consulting Physician (Cardiology)  Courtney Tubbs MD as Consulting Physician (Cardiology)  Carter Crawford MD as Consulting Physician (Nephrology)  Paxton Vasques OD as Consulting Physician (Optometry)  PRANAY Villalobos MD as Obstetrician (Obstetrics)  Parker Mccarthy IV, MD (Urology)  Ike King MD as Consulting Physician (Cardiology)  Jose Roland MD as Consulting Physician (Dermatology)  Prasanth Johnson MD as Consulting Physician (Rheumatology)  Ayanna Hart RN as Oncology Navigator  Nora Morin LCSW as   Elis Wick MD as Physician (Internal Medicine)      [facility] Note      Chief Complaint:  Chief Complaint   Patient presents with    follow up        History of Present Illness:  HPI    Seen today to follow up right flank and right side pain.   Today pain is primarily at midback.   Pain is significantly decreased with Norco PRN.   Work up shows advanced DDD specifically at T11/T12.   On max renal dose of Lyrica.   CT thoracic spine scheduled 1/9/23.    Saw rheumatology today:  Chronic back pain with mechanical pattern. No past fragility fractures.  Last Dexa with osteopenic value L2, significantly improved compared to previous.  On nasal calcitonin. Kidney function stable w/o recent hypocalcemia.  Imaging w/o evidence of compression / fragility fractures.  Repeat DXA to reassess fragility fracture risk and need to get back on Prolia (9/2019-7/2021).  C/w nasal calcitonin and adequate Ca/Vit D.    Appears more comfortable today.      Review of Systems   Constitutional:  Negative for chills, fever and malaise/fatigue.   Respiratory:  Negative for cough and shortness of breath.    Cardiovascular:  Negative for chest pain, orthopnea  and leg swelling.   Gastrointestinal:  Negative for constipation, diarrhea, heartburn, nausea and vomiting.   Genitourinary:  Negative for dysuria.   Musculoskeletal:  Positive for back pain. Negative for falls.   Neurological:  Negative for dizziness and headaches.       The following were reviewed: Active problem list, medication list, allergies, family history, social history, and Health Maintenance.     History:  Past Medical History:   Diagnosis Date    Abdominal wall hernia     CT Renal 6/11/2018---Small fat containing superior ventral abdominal wall hernia at the epicardial pacing lead site.    Abnormal mammogram 10/12/2021    Anxiety     Arthritis     ZEN HIPS    Breast cancer in female 08/2021    LEFT BREAST    C. difficile colitis 11/29/2021    Cellulitis of axilla, left 12/23/2021    Chronic diastolic heart failure 12/16/2021    Chronic kidney disease     stage 4, GFR 15-29 ml/min    Chronic midline low back pain without sciatica 06/18/2018    Closed nondisplaced fracture of distal phalanx of left great toe with routine healing 10/22/2018    Coronary artery disease 1993    heart transplant    Cystitis 5/10/2022    Depression     Fibromyalgia     on Lyrica    Heart failure     native heart cardiomyopathy    Heart transplanted 1993    due to cardiomyopathy    History of hyperparathyroidism; Hyperparathyroidism, secondary renal     PT DENIES    Hypertension     Immune disorder     anti rejection meds    Iron deficiency anemia 08/15/2017    Kidney stones     passed per pt    Obesity     Other osteoporosis without current pathological fracture 08/30/2019    Severe sepsis 11/22/2021    Shingles 2003 approx    left leg    Trouble in sleeping     Urinary incontinence      Past Surgical History:   Procedure Laterality Date    BLADDER SURGERY  2015 approx    mesh - Dr Everett then 2nd reconstructive sx Dr Onofre    BREAST BIOPSY Bilateral     NEGATIVE    BREAST BIOPSY Right 10/31/2022    benign    BREAST  LUMPECTOMY Left 2021    BREAST SURGERY Left 09/28/2015    Bx - benign    BREAST SURGERY Right 12/2015    Bx benign    CARDIAC PACEMAKER REMOVAL Left 06/26/2014    Pacer defirillator removed. Put in 1993 aat time of heart transplant    CARPAL TUNNEL RELEASE Left 03/03/2015    Dr. Hall    COLONOSCOPY N/A 02/25/2021    Procedure: COLONOSCOPY;  Surgeon: Freida Ramirez MD;  Location: [facility] ENDO;  Service: Endoscopy;  Laterality: N/A;    HEART TRANSPLANT  1993    HERNIA REPAIR Right 1971 approx    Inguinal    HYSTERECTOMY  1983    vag hyst /LSO     INCISION AND DRAINAGE OF ABSCESS Left 12/24/2021    Procedure: INCISION AND DRAINAGE, ABSCESS;  Surgeon: Joseph Longo MD;  Location: [facility] OR;  Service: General;  Laterality: Left;    INJECTION OF ANESTHETIC AGENT AROUND MEDIAL BRANCH NERVES INNERVATING LUMBAR FACET JOINT Right 10/19/2022    Procedure: Right L4/L5 and L5/S1 MBB;  Surgeon: Jassi Pierre MD;  Location: [facility] PAIN MGT;  Service: Pain Management;  Laterality: Right;    INJECTION OF ANESTHETIC AGENT AROUND MEDIAL BRANCH NERVES INNERVATING LUMBAR FACET JOINT Right 11/09/2022    Procedure: Right L4/L5 and L5/S1 MBB;  Surgeon: Jassi Pierre MD;  Location: [facility] PAIN MGT;  Service: Pain Management;  Laterality: Right;    INJECTION OF ANESTHETIC AGENT INTO SACROILIAC JOINT Right 08/22/2022    Procedure: Right SIJ Injection Right L5/S1 Facte Injection;  Surgeon: Jassi Pierre MD;  Location: [facility] PAIN MGT;  Service: Pain Management;  Laterality: Right;    INSERTION OF TUNNELED CENTRAL VENOUS CATHETER (CVC) WITH SUBCUTANEOUS PORT N/A 11/09/2021    Procedure: [id number];  Surgeon: Christoph Douglas MD;  Location: [facility] OR;  Service: General;  Laterality: N/A;    RADIOFREQUENCY THERMOCOAGULATION Right 12/7/2022    Procedure: Right L4/L5 and L5/S1 Lumbar RFA;  Surgeon: Jassi Pierre MD;  Location: [facility] PAIN MGT;  Service: Pain Management;  Laterality: Right;    REMOVAL OF VASCULAR ACCESS PORT       SENTINEL LYMPH NODE BIOPSY Left 10/12/2021    Procedure: BIOPSY, LYMPH NODE, SENTINEL;  Surgeon: Christoph Douglas MD;  Location: [facility];  Service: General;  Laterality: Left;    TOE SURGERY       Family History   Problem Relation Age of Onset    Cancer Mother 38        breast    Breast cancer Mother     Breast cancer Maternal Grandmother     Heart disease Maternal Grandmother     Hypertension Son     Cataracts Cousin     Diabetes Neg Hx     Stroke Neg Hx     Kidney disease Neg Hx     Asthma Neg Hx     COPD Neg Hx     Melanoma Neg Hx     Hyperlipidemia Neg Hx      Social History     Socioeconomic History    Marital status: Single    Number of children: 2    Highest education level: 11th grade   Occupational History    Occupation: Retired   Tobacco Use    Smoking status: Never    Smokeless tobacco: Never   Substance and Sexual Activity    Alcohol use: Never     Alcohol/week: 0.0 standard drinks    Drug use: No    Sexual activity: Not Currently     Partners: Male     Birth control/protection: See Surgical Hx   Other Topics Concern    Are you pregnant or think you may be? No    Breast-feeding No   Social History Narrative    Single. 2 children , 1  at 30 yoa   strep throat -  pneumonia and renal complications after not completing course of AB. Other child lives in [place], [place]. Has a cousin locally that could help in an emergency. 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 Caffeine intake =- 1 cola a day. No coffee, + occasional tea, avoids caffeine especially at night. Still drives. She does not have a Living Will or Advanced directive.      Patient Active Problem List   Diagnosis    Heart transplanted    Anxiety    Insomnia    CKD (chronic kidney disease) stage 4, GFR 15-29 ml/min    Immunosuppression due to drug therapy    Fibromyalgia    Gastroesophageal reflux disease without esophagitis    Breast screening    Immunization deficiency    Essential hypertension    Aortic  atherosclerosis    Chronic major depressive disorder, recurrent episode    Iron deficiency anemia    Vaginal atrophy    Hyperparathyroidism    Hx of falling    Chronic midline low back pain without sciatica    Lightheadedness    Medication side effects    Obesity (BMI 30.0-34.9)    Edema    Asymptomatic menopausal state     Other osteoporosis without current pathological fracture    Cough    Abnormal CT scan, kidney    Weakness of both lower extremities    Immunocompromised patient    Osteoporosis, post-menopausal    Ductal carcinoma in situ (DCIS) of left breast    Immunodeficiency secondary to radiation therapy    The hangs, uncomplicated    GRACE (acute kidney injury)    Diarrhea    Thrombocytopenia, unspecified    Immunodeficiency due to chemotherapy    Strain of left shoulder    Left shoulder pain    Ulnar neuropathy of left upper extremity    Anemia associated with stage 4 chronic renal failure    Secondary and unspecified malignant neoplasm of axilla and upper limb lymph nodes    Other chest pain    Abnormal thyroid function test    Hypomagnesemia    Slow transit constipation    Other hyperlipidemia    Sacroiliac joint pain    Impaired mobility    Cyst of right breast    Leukopenia    Sacroiliitis    Right upper quadrant abdominal pain    DDD (degenerative disc disease), thoracolumbar    Ventral hernia without obstruction or gangrene     Review of patient's allergies indicates:   Allergen Reactions    Lisinopril Swelling and Rash    Augmentin [amoxicillin-pot clavulanate] Diarrhea       Medications:  Current Outpatient Medications on File Prior to Visit   Medication Sig Dispense Refill    amLODIPine (NORVASC) 2.5 MG tablet Take 1 tablet (2.5 mg total) by mouth once daily. 90 tablet 3    aspirin (ECOTRIN) 81 MG EC tablet Take 1 tablet (81 mg total) by mouth once daily. 90 tablet 3    biotin 10,000 mcg Cap Take 1 tablet by mouth once daily.      busPIRone (BUSPAR) 10 MG tablet Take 1 tablet (10 mg total) by mouth  once daily. 90 tablet 3    calcitonin, salmon, (FORTICAL) 200 unit/actuation nasal spray 1 spray by Nasal route once daily. 3 each 3    carvediloL (COREG) 6.25 MG tablet Take 1 tablet (6.25 mg total) by mouth 2 (two) times daily with meals. 180 tablet 3    cycloSPORINE modified, NEORAL, (NEORAL) 25 MG capsule TAKE 3 CAPSULES (75 MG TOTAL) BY MOUTH 2 (TWO) TIMES DAILY. 540 capsule 6    DULoxetine (CYMBALTA) 30 MG capsule TAKE 1 CAPSULE EVERY DAY 90 capsule 1    EVENING PRIMROSE OIL ORAL Take 1,000 mg by mouth once daily.      hydrALAZINE (APRESOLINE) 50 MG tablet Take 1 tablet (50 mg total) by mouth every 8 (eight) hours. TAKE 1 TABLETS  EVERY 8  HOURS. 270 tablet 3    HYDROcodone-acetaminophen (NORCO) 7.5-325 mg per tablet Take 1 tablet by mouth every 6 (six) hours as needed for Pain (acute right flank pain). 15 tablet 0    multivitamin capsule Take 1 capsule by mouth once daily.      pantoprazole (PROTONIX) 40 MG tablet TAKE 1 TABLET EVERY DAY 90 tablet 1    pregabalin (LYRICA) 50 MG capsule TAKE 1 CAPSULE 2 TIMES DAILY AT NOON AND NIGHT TIME 180 capsule 1    RETACRIT 20,000 unit/mL injection       temazepam (RESTORIL) 30 mg capsule TAKE 1 CAPSULE AT BEDTIME 30 capsule 5    tiZANidine (ZANAFLEX) 4 MG tablet Take 1 tablet (4 mg total) by mouth 2 (two) times daily as needed. 180 tablet 0    vitamin E 400 UNIT capsule Take 400 Units by mouth once daily.      zolpidem (AMBIEN) 10 mg Tab TAKE 1 TABLET EVERY EVENING 90 tablet 0    furosemide (LASIX) 20 MG tablet Take 1 tablet (20 mg total) by mouth 2 (two) times a day for 5 days, THEN 1 tablet (20 mg total) once daily. Take 1 tablet daily. 370 tablet 0     Current Facility-Administered Medications on File Prior to Visit   Medication Dose Route Frequency Provider Last Rate Last Admin    denosumab (PROLIA) injection 60 mg  60 mg Subcutaneous Q6 Months Prasanth Johnson MD        lactated ringers infusion   Intravenous Continuous Jennifer Carr MD 10 mL/hr at 11/09/21 0717  Restarted at 11/09/21 0820       Medications have been reviewed and reconciled with patient at visit today.    Barriers to medications present (no )    Fall since last office visit (no )      Exam:  Vitals:    01/05/23 1453   BP: 130/72   Pulse: 91   Temp: 98.6 °F (37 °C)     Weight: 75.4 kg (166 lb 4.8 oz)   Body mass index is 30.42 kg/m².      BP Readings from Last 3 Encounters:   01/05/23 130/72   01/05/23 121/82   01/04/23 134/76     Wt Readings from Last 3 Encounters:   01/05/23 1453 75.4 kg (166 lb 4.8 oz)   01/05/23 1112 75.8 kg (167 lb 1.7 oz)   12/07/22 0909 75.8 kg (167 lb 3.5 oz)            Physical Exam  Constitutional:       General: She is not in acute distress.  HENT:      Mouth/Throat:      Mouth: Mucous membranes are moist.   Eyes:      General: No scleral icterus.  Cardiovascular:      Rate and Rhythm: Normal rate and regular rhythm.      Heart sounds: Murmur heard.   Pulmonary:      Breath sounds: Normal breath sounds.   Abdominal:      General: Bowel sounds are normal. There is no distension.      Palpations: Abdomen is soft. There is no mass.      Tenderness: There is abdominal tenderness (RUQ pain reproduced.). There is no guarding.   Skin:     General: Skin is warm and dry.   Neurological:      Mental Status: She is alert. Mental status is at baseline.   Psychiatric:         Mood and Affect: Mood normal.         Behavior: Behavior normal.       Laboratory Reviewed: (Yes)  Lab Results   Component Value Date    WBC 3.60 (L) 01/04/2023    HGB 9.3 (L) 01/04/2023    HCT 28.5 (L) 01/04/2023     01/04/2023    CHOL 193 10/04/2022    TRIG 159 (H) 10/04/2022    HDL 46 10/04/2022    ALT 21 01/04/2023    AST 36 01/04/2023     01/04/2023    K 4.3 01/04/2023     01/04/2023    CREATININE 2.7 (H) 01/04/2023    BUN 31 (H) 01/04/2023    CO2 20 (L) 01/04/2023    TSH 5.231 (H) 06/14/2022    PSA <0.1 05/27/2008    INR 1.0 11/22/2021    HGBA1C 5.2 12/24/2021           [facility]  Maintenance Topics with due status: Not Due       Topic Last Completion Date    Pneumococcal Vaccines (Age 65+) 10/22/2018    TETANUS VACCINE 09/09/2020    Colorectal Cancer Screening 02/25/2021    DEXA Scan 08/03/2021    Hemoglobin A1c (Diabetic Prevention Screening) 12/24/2021    Lipid Panel 10/04/2022    Mammogram 11/28/2022     Health Maintenance Due   Topic Date Due    COVID-19 Vaccine (3 - Moderna risk series) 08/10/2022       Assessment:  Problem List Items Addressed This Visit          Neuro    DDD (degenerative disc disease), thoracolumbar - Primary     Has appt with pain mgmt next week.   Continues Lyrica.         Relevant Medications    LIDOcaine (LIDODERM) 5 %       GI    Ventral hernia without obstruction or gangrene     She would like evaluation by Dr Douglas.   Will help schedule appt.         Relevant Orders    Ambulatory referral/consult to General Surgery         Plan:  DDD (degenerative disc disease), thoracolumbar  -     LIDOcaine (LIDODERM) 5 %; Place 2 patches onto the skin once daily. Remove & Discard patch within 12 hours or as directed by MD  Dispense: 60 patch; Refill: 2    Ventral hernia without obstruction or gangrene  -     Ambulatory referral/consult to General Surgery; Future; Expected date: 01/06/2023      -Patient's lab results were reviewed and discussed with patient  -Treatment options and alternatives were discussed with the patient. Patient expressed understanding. Patient was given the opportunity to ask questions and be an active participant in their medical care. Patient had no further questions or concerns at this time.   -Documentation of patient's health and condition was obtained from family member who was present during visit.  -Patient is an overall moderate risk for health complications from their medical conditions.       Follow up: Follow up in about 1 month (around 2/5/2023).      After visit summary printed and given to patient upon discharge.  Patient goals and care  plan are included in After visit summary.    Total medical decision making time was 29 min.  The following issues were discussed: The primary encounter diagnosis was DDD (degenerative disc disease), thoracolumbar. A diagnosis of Ventral hernia without obstruction or gangrene was also pertinent to this visit.    Health maintenance needs, recent test results and goals of care discussed with pt and questions answered.

## 2023-01-09 ENCOUNTER — HOSPITAL ENCOUNTER (OUTPATIENT)
Dept: RADIOLOGY | Facility: HOSPITAL | Age: 68
Discharge: HOME | End: 2023-01-09
Attending: ANESTHESIOLOGY
Payer: MEDICARE

## 2023-01-09 DIAGNOSIS — M54.14 THORACIC RADICULOPATHY: ICD-10-CM

## 2023-01-09 PROCEDURE — 72128 CT THORACIC SPINE WITHOUT CONTRAST: ICD-10-PCS | Mod: 26,HCNC,, | Performed by: RADIOLOGY

## 2023-01-09 PROCEDURE — 72128 CT CHEST SPINE W/O DYE: CPT | Mod: 26,HCNC,, | Performed by: RADIOLOGY

## 2023-01-09 PROCEDURE — 72128 CT CHEST SPINE W/O DYE: CPT | Mod: TC,HCNC

## 2023-01-13 ENCOUNTER — OFFICE VISIT (OUTPATIENT)
Dept: PAIN MEDICINE | Facility: CLINIC | Age: 68
End: 2023-01-13
Payer: MEDICARE

## 2023-01-13 VITALS — HEIGHT: 62 IN | BODY MASS INDEX: 30.02 KG/M2 | RESPIRATION RATE: 17 BRPM | WEIGHT: 163.13 LBS

## 2023-01-13 DIAGNOSIS — M47.816 LUMBAR SPONDYLOSIS: ICD-10-CM

## 2023-01-13 DIAGNOSIS — M54.14 THORACIC RADICULOPATHY: Primary | ICD-10-CM

## 2023-01-13 DIAGNOSIS — M47.814 THORACIC SPONDYLOSIS: ICD-10-CM

## 2023-01-13 DIAGNOSIS — M51.36 DDD (DEGENERATIVE DISC DISEASE), LUMBAR: ICD-10-CM

## 2023-01-13 PROCEDURE — 1101F PT FALLS ASSESS-DOCD LE1/YR: CPT | Mod: HCNC,CPTII,S$GLB, | Performed by: ANESTHESIOLOGY

## 2023-01-13 PROCEDURE — 3008F BODY MASS INDEX DOCD: CPT | Mod: HCNC,CPTII,S$GLB, | Performed by: ANESTHESIOLOGY

## 2023-01-13 PROCEDURE — 99213 OFFICE O/P EST LOW 20 MIN: CPT | Mod: HCNC,S$GLB,, | Performed by: ANESTHESIOLOGY

## 2023-01-13 PROCEDURE — 1125F PR PAIN SEVERITY QUANTIFIED, PAIN PRESENT: ICD-10-PCS | Mod: HCNC,CPTII,S$GLB, | Performed by: ANESTHESIOLOGY

## 2023-01-13 PROCEDURE — 1125F AMNT PAIN NOTED PAIN PRSNT: CPT | Mod: HCNC,CPTII,S$GLB, | Performed by: ANESTHESIOLOGY

## 2023-01-13 PROCEDURE — 3288F PR FALLS RISK ASSESSMENT DOCUMENTED: ICD-10-PCS | Mod: HCNC,CPTII,S$GLB, | Performed by: ANESTHESIOLOGY

## 2023-01-13 PROCEDURE — 3008F PR BODY MASS INDEX (BMI) DOCUMENTED: ICD-10-PCS | Mod: HCNC,CPTII,S$GLB, | Performed by: ANESTHESIOLOGY

## 2023-01-13 PROCEDURE — 99999 PR PBB SHADOW E&M-EST. PATIENT-LVL III: CPT | Mod: PBBFAC,HCNC,, | Performed by: ANESTHESIOLOGY

## 2023-01-13 PROCEDURE — 3288F FALL RISK ASSESSMENT DOCD: CPT | Mod: HCNC,CPTII,S$GLB, | Performed by: ANESTHESIOLOGY

## 2023-01-13 PROCEDURE — 1157F ADVNC CARE PLAN IN RCRD: CPT | Mod: HCNC,CPTII,S$GLB, | Performed by: ANESTHESIOLOGY

## 2023-01-13 PROCEDURE — 1101F PR PT FALLS ASSESS DOC 0-1 FALLS W/OUT INJ PAST YR: ICD-10-PCS | Mod: HCNC,CPTII,S$GLB, | Performed by: ANESTHESIOLOGY

## 2023-01-13 PROCEDURE — 1157F PR ADVANCE CARE PLAN OR EQUIV PRESENT IN MEDICAL RECORD: ICD-10-PCS | Mod: HCNC,CPTII,S$GLB, | Performed by: ANESTHESIOLOGY

## 2023-01-13 PROCEDURE — 99999 PR PBB SHADOW E&M-EST. PATIENT-LVL III: ICD-10-PCS | Mod: PBBFAC,HCNC,, | Performed by: ANESTHESIOLOGY

## 2023-01-13 PROCEDURE — 99213 PR OFFICE/OUTPT VISIT, EST, LEVL III, 20-29 MIN: ICD-10-PCS | Mod: HCNC,S$GLB,, | Performed by: ANESTHESIOLOGY

## 2023-01-13 RX ORDER — OXYCODONE AND ACETAMINOPHEN 7.5; 325 MG/1; MG/1
1 TABLET ORAL EVERY 8 HOURS PRN
Qty: 21 TABLET | Refills: 0 | Status: SHIPPED | OUTPATIENT
Start: 2023-01-13 | End: 2023-03-28

## 2023-01-14 NOTE — H&P (VIEW-ONLY)
Interventional Pain Progress Note     Referring Physician: No ref. provider found    PCP: Luz Dickson NP    Chief Complaint: Low Back Pain    Interval History (1/13/22):  Patient returns to clinic for evaluation of thoracic pain.  Patient reports approximately 2 weeks ago she had sudden onset of lower thoracic pain.  She denies any significant inciting factors to his pain.  Pain starts as a sharp stabbing pain in her lower midline thoracic area and then radiates to her right side to her anterior chest wall.  Pain is worse with standing and walking, better with lying supine.  Pain is rated an 8/10.  Patient reports that this pain is significantly different from her previous lumbar pain. Denies any fevers, chills,  or bowel and bladder incontinence        Interval History (9/30/22):  Patient returns to clinic after procedure.  Patient reports complete resolution of right lower extremity pain after right sacroiliac joint injection and right L5-S1 facet injection on 08/22/2022.  Primary pain today is tight aching pain in right side of lower back.  Pain is worse with extension and rotation, better with rest and heat.  Patient does report an episode of physical therapy on 09/21/2022 where she experienced increased muscle aches and weakness.  She reports that since this time her symptoms have greatly improved.  She denies any significant weakness today.  Pain is rated a 7/10. Denies any fevers, chills, changes in gait, weakness, or bowel and bladder incontinence        SUBJECTIVE:    Nadia Damon is a 67 y.o. female who presents to the clinic for the evaluation of lower back pain. The pain started 1 year ago and symptoms have been worsening.The pain is located in the right lower back and right buttocks area with radiation to the posterior lateral aspect of her right thigh and occasionally her right calf.  The pain is described as aching, shooting and stabbing and is rated as 5/10.   The pain is rated with a  score of  2/10 on the BEST day and a score of 9/10 on the WORST day.  Symptoms interfere with daily activity and work. The pain is exacerbated by Sitting, Standing, Extension and Flexing.  The pain is mitigated by physical therapy and rest.     Patient denies night fever/night sweats, urinary incontinence, bowel incontinence, significant weight loss, significant motor weakness and loss of sensations.      Non-Pharmacologic Treatments:  Physical Therapy/Home Exercise: yes  Ice/Heat:yes  TENS: no  Acupuncture: no  Massage: no  Chiropractic: no        Previous Pain Medications:  Tylenol, topicals, neuropathic,      report:  Reviewed and consistent with medication use as prescribed.    Pain Procedures:   12/7/22:  Right L4-5 and L5-S1 radiofrequency ablation, 75% relief  8/22/22:  Right sacroiliac joint and right L5-S1 facet injection,   Resolution of right lower extremity pain    Imaging:     CT THORACIC SPINE WITHOUT CONTRAST 1/9/23     CLINICAL HISTORY:  Myelopathy, acute, thoracic spine;  Radiculopathy, thoracic region     TECHNIQUE:  Helical axial images are acquired through the thoracic spine without IV contrast.  Images were reformatted in the coronal sagittal plane.     COMPARISON:  None     FINDINGS:  Paraspinal soft tissues appear within normal limits.  Partially visualized lung fields demonstrate band like opacity at the lingula.  Favor scarring/atelectasis.  Partially visualized abdominal contents are within normal limits.     Alignment is within normal limits.  There is no anterolisthesis or retrolisthesis.  Vertebral body heights are relatively well preserved.  There appears to be a chronic fracture deformity along the posterior spinous process of C7.  Borders of the bone fragment are well corticated.  No evidence of acute injury.  Partially visualized ribs appear intact.     Evaluation for central canal narrowing is limited without intrathecal contrast.     Multilevel degenerative disc changes are  present, worst at the mid to lower thoracic spine.  Degenerative disc changes are worst at T11-T12.  There is a 5 mm disc bulge/herniation at T11-T12.  Disc bulge/herniation is slightly high density with minimal peripheral calcification.     No bony neural foraminal narrowing from C7 to T11.  There is moderate to severe right and mild-to-moderate left neural foraminal narrowing.     Impression:     1. Degenerative disc changes, worst at T11-T12.  Evaluation for central canal narrowing is limited without intrathecal contrast.  2. Bony neural foraminal narrowing is worst at the right T11-T12 foramen.         Results for orders placed during the hospital encounter of 07/13/22    X-Ray Lumbar Spine 5 View    Narrative  EXAMINATION:  XR LUMBAR SPINE COMPLETE 5 VIEW    CLINICAL HISTORY:  Sacrococcygeal disorders, not elsewhere classified    TECHNIQUE:  AP, lateral, spot and bilateral oblique views of the lumbar spine were performed.    COMPARISON:  Lumbar spine radiographs May 17, 2018    FINDINGS:  Minimal grade 1 anterolisthesis of L4 on L5.  No fracture or pars defect.  Unchanged mild disc height loss at the L4-L5 level.  Moderate to severe degenerative disc disease at the T11-T12 level.  Prominent inferior lumbar spine facet arthropathy.  Sacroiliac joints appear normal.  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  No osseous erosion or suspicious osseous lesion.    Impression  As above.      Electronically signed by: Isac Bell  Date:    07/13/2022  Time:    15:39          Past Medical History:   Diagnosis Date    Abdominal wall hernia     CT Renal 6/11/2018---Small fat containing superior ventral abdominal wall hernia at the epicardial pacing lead site.    Abnormal mammogram 10/12/2021    Anxiety     Arthritis     ZEN HIPS    Breast cancer in female 08/2021    LEFT BREAST    C. difficile colitis 11/29/2021    Cellulitis of axilla, left 12/23/2021    Chronic  diastolic heart failure 12/16/2021    Chronic kidney disease     stage 4, GFR 15-29 ml/min    Chronic midline low back pain without sciatica 06/18/2018    Closed nondisplaced fracture of distal phalanx of left great toe with routine healing 10/22/2018    Coronary artery disease 1993    heart transplant    Cystitis 5/10/2022    Depression     Fibromyalgia     on Lyrica    Heart failure     native heart cardiomyopathy    Heart transplanted 1993    due to cardiomyopathy    History of hyperparathyroidism; Hyperparathyroidism, secondary renal     PT DENIES    Hypertension     Immune disorder     anti rejection meds    Iron deficiency anemia 08/15/2017    Kidney stones     passed per pt    Obesity     Other osteoporosis without current pathological fracture 08/30/2019    Severe sepsis 11/22/2021    Shingles 2003 approx    left leg    Trouble in sleeping     Urinary incontinence      Past Surgical History:   Procedure Laterality Date    BLADDER SURGERY  2015 approx    mesh - Dr Everett then 2nd reconstructive sx Dr Onofre    BREAST BIOPSY Bilateral     NEGATIVE    BREAST BIOPSY Right 10/31/2022    benign    BREAST LUMPECTOMY Left 2021    BREAST SURGERY Left 09/28/2015    Bx - benign    BREAST SURGERY Right 12/2015    Bx benign    CARDIAC PACEMAKER REMOVAL Left 06/26/2014    Pacer defirillator removed. Put in 1993 aat time of heart transplant    CARPAL TUNNEL RELEASE Left 03/03/2015    Dr. Hall    COLONOSCOPY N/A 02/25/2021    Procedure: COLONOSCOPY;  Surgeon: Freida Ramirez MD;  Location: [facility] ENDO;  Service: Endoscopy;  Laterality: N/A;    HEART TRANSPLANT  1993    HERNIA REPAIR Right 1971 approx    Inguinal    HYSTERECTOMY  1983    vag hyst /LSO     INCISION AND DRAINAGE OF ABSCESS Left 12/24/2021    Procedure: INCISION AND DRAINAGE, ABSCESS;  Surgeon: Joseph Longo MD;  Location: [facility] OR;  Service: General;  Laterality: Left;    INJECTION OF ANESTHETIC AGENT AROUND MEDIAL BRANCH NERVES INNERVATING LUMBAR FACET  JOINT Right 10/19/2022    Procedure: Right L4/L5 and L5/S1 MBB;  Surgeon: Jassi Pierre MD;  [facility];  Service: Pain Management;  Laterality: Right;    INJECTION OF ANESTHETIC AGENT AROUND MEDIAL BRANCH NERVES INNERVATING LUMBAR FACET JOINT Right 2022    Procedure: Right L4/L5 and L5/S1 MBB;  Surgeon: Jassi Pierre MD;  [facility];  Service: Pain Management;  Laterality: Right;    INJECTION OF ANESTHETIC AGENT INTO SACROILIAC JOINT Right 2022    Procedure: Right SIJ Injection Right L5/S1 Facte Injection;  Surgeon: Jassi Pierre MD;  [facility];  Service: Pain Management;  Laterality: Right;    INSERTION OF TUNNELED CENTRAL VENOUS CATHETER (CVC) WITH SUBCUTANEOUS PORT N/A 2021    Procedure: [id number];  Surgeon: Christoph Douglas MD;  Location: [facility] OR;  Service: General;  Laterality: N/A;    RADIOFREQUENCY THERMOCOAGULATION Right 2022    Procedure: Right L4/L5 and L5/S1 Lumbar RFA;  Surgeon: Jassi Pierre MD;  Location: [facility] PAIN MGT;  Service: Pain Management;  Laterality: Right;    REMOVAL OF VASCULAR ACCESS PORT      SENTINEL LYMPH NODE BIOPSY Left 10/12/2021    Procedure: BIOPSY, LYMPH NODE, SENTINEL;  Surgeon: Christoph Douglas MD;  Location: [facility] OR;  Service: General;  Laterality: Left;    TOE SURGERY       Social History     Socioeconomic History    Marital status: Single    Number of children: 2    Highest education level: 11th grade   Occupational History    Occupation: Retired   Tobacco Use    Smoking status: Never    Smokeless tobacco: Never   Substance and Sexual Activity    Alcohol use: Never     Alcohol/week: 0.0 standard drinks    Drug use: No    Sexual activity: Not Currently     Partners: Male     Birth control/protection: See Surgical Hx   Other Topics Concern    Are you pregnant or think you may be? No    Breast-feeding No   Social History Narrative    Single. 2 children , 1  at 30 yoa   strep throat -   pneumonia and renal complications after not completing course of AB. Other child lives in [place], [place]. Has a cousin locally that could help in an emergency. 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 Caffeine intake =- 1 cola a day. No coffee, + occasional tea, avoids caffeine especially at night. Still drives. She does not have a Living Will or Advanced directive.      Family History   Problem Relation Age of Onset    Cancer Mother 38        breast    Breast cancer Mother     Breast cancer Maternal Grandmother     Heart disease Maternal Grandmother     Hypertension Son     Cataracts Cousin     Diabetes Neg Hx     Stroke Neg Hx     Kidney disease Neg Hx     Asthma Neg Hx     COPD Neg Hx     Melanoma Neg Hx     Hyperlipidemia Neg Hx        Review of patient's allergies indicates:   Allergen Reactions    Lisinopril Swelling and Rash    Augmentin [amoxicillin-pot clavulanate] Diarrhea       Current Outpatient Medications   Medication Sig    amLODIPine (NORVASC) 2.5 MG tablet Take 1 tablet (2.5 mg total) by mouth once daily.    aspirin (ECOTRIN) 81 MG EC tablet Take 1 tablet (81 mg total) by mouth once daily.    biotin 10,000 mcg Cap Take 1 tablet by mouth once daily.    busPIRone (BUSPAR) 10 MG tablet Take 1 tablet (10 mg total) by mouth once daily.    calcitonin, salmon, (FORTICAL) 200 unit/actuation nasal spray 1 spray by Nasal route once daily.    carvediloL (COREG) 6.25 MG tablet Take 1 tablet (6.25 mg total) by mouth 2 (two) times daily with meals.    cycloSPORINE modified, NEORAL, (NEORAL) 25 MG capsule TAKE 3 CAPSULES (75 MG TOTAL) BY MOUTH 2 (TWO) TIMES DAILY.    DULoxetine (CYMBALTA) 30 MG capsule TAKE 1 CAPSULE EVERY DAY    EVENING PRIMROSE OIL ORAL Take 1,000 mg by mouth once daily.    hydrALAZINE (APRESOLINE) 50 MG tablet Take 1 tablet (50 mg total) by mouth every 8 (eight) hours. TAKE 1 TABLETS  EVERY 8  HOURS.    LIDOcaine (LIDODERM) 5 % Place 2 patches onto the skin once  daily. Remove & Discard patch within 12 hours or as directed by MD    multivitamin capsule Take 1 capsule by mouth once daily.    pantoprazole (PROTONIX) 40 MG tablet TAKE 1 TABLET EVERY DAY    RETACRIT 20,000 unit/mL injection     temazepam (RESTORIL) 30 mg capsule TAKE 1 CAPSULE AT BEDTIME    tiZANidine (ZANAFLEX) 4 MG tablet TAKE 1 TABLET TWICE DAILY AS NEEDED    vitamin E 400 UNIT capsule Take 400 Units by mouth once daily.    zolpidem (AMBIEN) 10 mg Tab TAKE 1 TABLET EVERY EVENING    furosemide (LASIX) 20 MG tablet Take 1 tablet (20 mg total) by mouth 2 (two) times a day for 5 days, THEN 1 tablet (20 mg total) once daily. Take 1 tablet daily.    oxyCODONE-acetaminophen (PERCOCET) 7.5-325 mg per tablet Take 1 tablet by mouth every 8 (eight) hours as needed for Pain.     Current Facility-Administered Medications   Medication    denosumab (PROLIA) injection 60 mg     Facility-Administered Medications Ordered in Other Visits   Medication    lactated ringers infusion         ROS  Review of Systems   Constitutional:  Negative for chills, diaphoresis, fatigue and fever.   Respiratory:  Negative for chest tightness, shortness of breath, wheezing and stridor.    Cardiovascular:  Positive for leg swelling. Negative for chest pain.   Gastrointestinal:  Negative for blood in stool, diarrhea, nausea and vomiting.   Endocrine: Negative for cold intolerance and heat intolerance.   Genitourinary:  Negative for dysuria, hematuria and urgency.   Musculoskeletal:  Positive for arthralgias, back pain and myalgias. Negative for gait problem, joint swelling, neck pain and neck stiffness.   Skin:  Negative for rash.   Neurological:  Negative for tremors, seizures, speech difficulty, weakness, light-headedness, numbness and headaches.   Hematological:  Does not bruise/bleed easily.   Psychiatric/Behavioral:  Negative for agitation, confusion and suicidal ideas. The patient is not nervous/anxious.           OBJECTIVE:  Resp 17   Ht 5'  "2" (1.575 m)   Wt 74 kg (163 lb 2.3 oz)   LMP 06/20/1983 (Within Years)   BMI 29.84 kg/m²         Physical Exam  Constitutional:       General: She is not in acute distress.     Appearance: Normal appearance. She is not ill-appearing.   HENT:      Head: Normocephalic and atraumatic.      Nose: No congestion or rhinorrhea.      Mouth/Throat:      Mouth: Mucous membranes are moist.   Eyes:      Extraocular Movements: Extraocular movements intact.      Pupils: Pupils are equal, round, and reactive to light.   Cardiovascular:      Pulses: Normal pulses.   Pulmonary:      Effort: Pulmonary effort is normal.   Abdominal:      General: Abdomen is flat.      Palpations: Abdomen is soft.   Musculoskeletal:      Cervical back: Normal range of motion and neck supple.   Skin:     General: Skin is warm and dry.      Capillary Refill: Capillary refill takes less than 2 seconds.   Neurological:      General: No focal deficit present.      Mental Status: She is alert and oriented to person, place, and time.      Cranial Nerves: No cranial nerve deficit.      Sensory: No sensory deficit.      Motor: No weakness or abnormal muscle tone.      Gait: Gait normal.      Deep Tendon Reflexes: Babinski sign absent on the right side. Babinski sign absent on the left side.      Reflex Scores:       Patellar reflexes are 2+ on the right side and 2+ on the left side.       Achilles reflexes are 2+ on the right side and 2+ on the left side.  Psychiatric:         Mood and Affect: Mood normal.         Behavior: Behavior normal.         Thought Content: Thought content normal.         Musculoskeletal:    Thoracic Exam  Incision: no  Pain with Flexion: yes  Pain with Extension: yes  Facet TTP:  T11-12, right greater than left  ROM:  Decreased    Lumbar Exam  Incision: no  Pain with Flexion: yes  Pain with Extension: yes  ROM:  Decreased  Paraspinous TTP:  Right-greater-than-left      LABS:  Lab Results   Component Value Date    WBC 3.60 (L) " 01/04/2023    HGB 9.3 (L) 01/04/2023    HCT 28.5 (L) 01/04/2023    MCV 88 01/04/2023     01/04/2023       CMP  Sodium   Date Value Ref Range Status   01/04/2023 138 136 - 145 mmol/L Final     Potassium   Date Value Ref Range Status   01/04/2023 4.3 3.5 - 5.1 mmol/L Final     Chloride   Date Value Ref Range Status   01/04/2023 106 95 - 110 mmol/L Final     CO2   Date Value Ref Range Status   01/04/2023 20 (L) 23 - 29 mmol/L Final     Glucose   Date Value Ref Range Status   01/04/2023 108 70 - 110 mg/dL Final     BUN   Date Value Ref Range Status   01/04/2023 31 (H) 8 - 23 mg/dL Final     Creatinine   Date Value Ref Range Status   01/04/2023 2.7 (H) 0.5 - 1.4 mg/dL Final     Calcium   Date Value Ref Range Status   01/04/2023 8.7 8.7 - 10.5 mg/dL Final     Total Protein   Date Value Ref Range Status   01/04/2023 7.7 6.0 - 8.4 g/dL Final     Albumin   Date Value Ref Range Status   01/04/2023 3.3 (L) 3.5 - 5.2 g/dL Final   01/04/2023 3.3 (L) 3.5 - 5.2 g/dL Final     Total Bilirubin   Date Value Ref Range Status   01/04/2023 0.5 0.1 - 1.0 mg/dL Final     Comment:     For infants and newborns, interpretation of results should be based  on gestational age, weight and in agreement with clinical  observations.    Premature Infant recommended reference ranges:  Up to 24 hours.............<8.0 mg/dL  Up to 48 hours............<12.0 mg/dL  3-5 days..................<15.0 mg/dL  6-29 days.................<15.0 mg/dL       Alkaline Phosphatase   Date Value Ref Range Status   01/04/2023 137 (H) 55 - 135 U/L Final     AST   Date Value Ref Range Status   01/04/2023 36 10 - 40 U/L Final     ALT   Date Value Ref Range Status   01/04/2023 21 10 - 44 U/L Final     Anion Gap   Date Value Ref Range Status   01/04/2023 12 8 - 16 mmol/L Final     eGFR if    Date Value Ref Range Status   07/14/2022 19 (A) >60 mL/min/1.73 m^2 Final     eGFR if non    Date Value Ref Range Status   07/14/2022 17 (A) >60  mL/min/1.73 m^2 Final     Comment:     Calculation used to obtain the estimated glomerular filtration  rate (eGFR) is the CKD-EPI equation.          Lab Results   Component Value Date    HGBA1C 5.2 12/24/2021             ASSESSMENT:       67 y.o. year old female with lower back pain, consistent with     1. Thoracic radiculopathy  IR HELLEN Cervical/THoracic w/ Img    Case Request-RAD/Other Procedure Area: T11/T12 IL HELLEN    oxyCODONE-acetaminophen (PERCOCET) 7.5-325 mg per tablet      2. Thoracic spondylosis        3. Lumbar spondylosis        4. DDD (degenerative disc disease), lumbar          Thoracic radiculopathy  -     IR HELLEN Cervical/THoracic w/ Img; Future; Expected date: 01/13/2023  -     Case Request-RAD/Other Procedure Area: T11/T12 IL HELLEN  -     oxyCODONE-acetaminophen (PERCOCET) 7.5-325 mg per tablet; Take 1 tablet by mouth every 8 (eight) hours as needed for Pain.  Dispense: 21 tablet; Refill: 0    Thoracic spondylosis    Lumbar spondylosis    DDD (degenerative disc disease), lumbar             PLAN:   - Interventions:    Schedule patient for T11-12 interlaminar epidural steroid injection for acute thoracic radiculopathy  -12/7/22:  Right L4-5 and L5-S1 radiofrequency ablation, 75% relief  -8/22/22:  Right sacroiliac joint and right L5-S1 facet injection,   Resolution of right lower extremity pain  - Anticoagulation use:   no no anticoagulation    - Medications:   Continue Tylenol, Cymbalta, and Lyrica as prescribed by primary care   Start Percocet 7.5mg PO q8h PRN, #21, no refills, 1/13/22, for acute onset thoracic radiculopathy    - Therapy:    Continue formal physical therapy    - Imaging:   CT of thoracic spine reviewed and findings discussed with patient.  Significant for right eccentric disc bulge at T11-12 with foraminal stenosis   X-ray Lumbar  reviewed. Significant for grade 1 anterolisthesis of L4 upon L5.  Degenerative disc disease at T11-T12.  As well as pseudo joint formation of right L5  transverse process with superior sacrum     - Consults:   Continue follow-up with cardiology for previous heart transplant    - Counseled patient regarding the importance of activity modification and physical therapy    - Patient Questions: Answered all of the patient's questions regarding diagnosis, therapy, and treatment    - Follow up visit:  Return to clinic 4 weeks after procedure      The above plan and management options were discussed at length with patient. Patient is in agreement with the above and verbalized understanding.    I discussed the goals of interventional chronic pain management with the patient on today's visit.  I explained the utility of injections for diagnostic and therapeutic purposes.  We discussed a multimodal approach to pain including treating the patient's given worst pain at any given time.  We will use a systematic approach to addressing pain.  We will also adopt a multimodal approach that includes injections, adjuvant medications, physical therapy, at times psychiatry.  There may be a limited role for opioid use intermittently in the treatment of pain, more particularly for acute pain although no one approach can be used as a sole treatment modality.    I emphasized the importance of regular exercise, core strengthening and stretching, diet and weight loss as a cornerstone of long-term pain management.      Jassi Pierre MD  Interventional Pain Management  [facility]    Disclaimer:  This note was prepared using voice recognition system and is likely to have sound alike errors that may have been overlooked even after proof reading.  Please call me with any questions

## 2023-01-14 NOTE — PROGRESS NOTES
Interventional Pain Progress Note     Referring Physician: No ref. provider found    PCP: Luz Dickson NP    Chief Complaint: Low Back Pain    Interval History (1/13/22):  Patient returns to clinic for evaluation of thoracic pain.  Patient reports approximately 2 weeks ago she had sudden onset of lower thoracic pain.  She denies any significant inciting factors to his pain.  Pain starts as a sharp stabbing pain in her lower midline thoracic area and then radiates to her right side to her anterior chest wall.  Pain is worse with standing and walking, better with lying supine.  Pain is rated an 8/10.  Patient reports that this pain is significantly different from her previous lumbar pain. Denies any fevers, chills,  or bowel and bladder incontinence        Interval History (9/30/22):  Patient returns to clinic after procedure.  Patient reports complete resolution of right lower extremity pain after right sacroiliac joint injection and right L5-S1 facet injection on 08/22/2022.  Primary pain today is tight aching pain in right side of lower back.  Pain is worse with extension and rotation, better with rest and heat.  Patient does report an episode of physical therapy on 09/21/2022 where she experienced increased muscle aches and weakness.  She reports that since this time her symptoms have greatly improved.  She denies any significant weakness today.  Pain is rated a 7/10. Denies any fevers, chills, changes in gait, weakness, or bowel and bladder incontinence        SUBJECTIVE:    Nadia Damon is a 67 y.o. female who presents to the clinic for the evaluation of lower back pain. The pain started 1 year ago and symptoms have been worsening.The pain is located in the right lower back and right buttocks area with radiation to the posterior lateral aspect of her right thigh and occasionally her right calf.  The pain is described as aching, shooting and stabbing and is rated as 5/10.   The pain is rated with a  score of  2/10 on the BEST day and a score of 9/10 on the WORST day.  Symptoms interfere with daily activity and work. The pain is exacerbated by Sitting, Standing, Extension and Flexing.  The pain is mitigated by physical therapy and rest.     Patient denies night fever/night sweats, urinary incontinence, bowel incontinence, significant weight loss, significant motor weakness and loss of sensations.      Non-Pharmacologic Treatments:  Physical Therapy/Home Exercise: yes  Ice/Heat:yes  TENS: no  Acupuncture: no  Massage: no  Chiropractic: no        Previous Pain Medications:  Tylenol, topicals, neuropathic,      report:  Reviewed and consistent with medication use as prescribed.    Pain Procedures:   12/7/22:  Right L4-5 and L5-S1 radiofrequency ablation, 75% relief  8/22/22:  Right sacroiliac joint and right L5-S1 facet injection,   Resolution of right lower extremity pain    Imaging:     CT THORACIC SPINE WITHOUT CONTRAST 1/9/23     CLINICAL HISTORY:  Myelopathy, acute, thoracic spine;  Radiculopathy, thoracic region     TECHNIQUE:  Helical axial images are acquired through the thoracic spine without IV contrast.  Images were reformatted in the coronal sagittal plane.     COMPARISON:  None     FINDINGS:  Paraspinal soft tissues appear within normal limits.  Partially visualized lung fields demonstrate band like opacity at the lingula.  Favor scarring/atelectasis.  Partially visualized abdominal contents are within normal limits.     Alignment is within normal limits.  There is no anterolisthesis or retrolisthesis.  Vertebral body heights are relatively well preserved.  There appears to be a chronic fracture deformity along the posterior spinous process of C7.  Borders of the bone fragment are well corticated.  No evidence of acute injury.  Partially visualized ribs appear intact.     Evaluation for central canal narrowing is limited without intrathecal contrast.     Multilevel degenerative disc changes are  present, worst at the mid to lower thoracic spine.  Degenerative disc changes are worst at T11-T12.  There is a 5 mm disc bulge/herniation at T11-T12.  Disc bulge/herniation is slightly high density with minimal peripheral calcification.     No bony neural foraminal narrowing from C7 to T11.  There is moderate to severe right and mild-to-moderate left neural foraminal narrowing.     Impression:     1. Degenerative disc changes, worst at T11-T12.  Evaluation for central canal narrowing is limited without intrathecal contrast.  2. Bony neural foraminal narrowing is worst at the right T11-T12 foramen.         Results for orders placed during the hospital encounter of 07/13/22    X-Ray Lumbar Spine 5 View    Narrative  EXAMINATION:  XR LUMBAR SPINE COMPLETE 5 VIEW    CLINICAL HISTORY:  Sacrococcygeal disorders, not elsewhere classified    TECHNIQUE:  AP, lateral, spot and bilateral oblique views of the lumbar spine were performed.    COMPARISON:  Lumbar spine radiographs May 17, 2018    FINDINGS:  Minimal grade 1 anterolisthesis of L4 on L5.  No fracture or pars defect.  Unchanged mild disc height loss at the L4-L5 level.  Moderate to severe degenerative disc disease at the T11-T12 level.  Prominent inferior lumbar spine facet arthropathy.  Sacroiliac joints appear normal.  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  No osseous erosion or suspicious osseous lesion.    Impression  As above.      Electronically signed by: Isac Bell  Date:    07/13/2022  Time:    15:39          Past Medical History:   Diagnosis Date    Abdominal wall hernia     CT Renal 6/11/2018---Small fat containing superior ventral abdominal wall hernia at the epicardial pacing lead site.    Abnormal mammogram 10/12/2021    Anxiety     Arthritis     ZEN HIPS    Breast cancer in female 08/2021    LEFT BREAST    C. difficile colitis 11/29/2021    Cellulitis of axilla, left 12/23/2021    Chronic  diastolic heart failure 12/16/2021    Chronic kidney disease     stage 4, GFR 15-29 ml/min    Chronic midline low back pain without sciatica 06/18/2018    Closed nondisplaced fracture of distal phalanx of left great toe with routine healing 10/22/2018    Coronary artery disease 1993    heart transplant    Cystitis 5/10/2022    Depression     Fibromyalgia     on Lyrica    Heart failure     native heart cardiomyopathy    Heart transplanted 1993    due to cardiomyopathy    History of hyperparathyroidism; Hyperparathyroidism, secondary renal     PT DENIES    Hypertension     Immune disorder     anti rejection meds    Iron deficiency anemia 08/15/2017    Kidney stones     passed per pt    Obesity     Other osteoporosis without current pathological fracture 08/30/2019    Severe sepsis 11/22/2021    Shingles 2003 approx    left leg    Trouble in sleeping     Urinary incontinence      Past Surgical History:   Procedure Laterality Date    BLADDER SURGERY  2015 approx    mesh - Dr Everett then 2nd reconstructive sx Dr Onofre    BREAST BIOPSY Bilateral     NEGATIVE    BREAST BIOPSY Right 10/31/2022    benign    BREAST LUMPECTOMY Left 2021    BREAST SURGERY Left 09/28/2015    Bx - benign    BREAST SURGERY Right 12/2015    Bx benign    CARDIAC PACEMAKER REMOVAL Left 06/26/2014    Pacer defirillator removed. Put in 1993 aat time of heart transplant    CARPAL TUNNEL RELEASE Left 03/03/2015    Dr. Hall    COLONOSCOPY N/A 02/25/2021    Procedure: COLONOSCOPY;  Surgeon: Freida Ramirez MD;  Location: [facility] ENDO;  Service: Endoscopy;  Laterality: N/A;    HEART TRANSPLANT  1993    HERNIA REPAIR Right 1971 approx    Inguinal    HYSTERECTOMY  1983    vag hyst /LSO     INCISION AND DRAINAGE OF ABSCESS Left 12/24/2021    Procedure: INCISION AND DRAINAGE, ABSCESS;  Surgeon: Joseph Longo MD;  Location: [facility] OR;  Service: General;  Laterality: Left;    INJECTION OF ANESTHETIC AGENT AROUND MEDIAL BRANCH NERVES INNERVATING LUMBAR FACET  JOINT Right 10/19/2022    Procedure: Right L4/L5 and L5/S1 MBB;  Surgeon: Jassi Pierre MD;  [facility];  Service: Pain Management;  Laterality: Right;    INJECTION OF ANESTHETIC AGENT AROUND MEDIAL BRANCH NERVES INNERVATING LUMBAR FACET JOINT Right 2022    Procedure: Right L4/L5 and L5/S1 MBB;  Surgeon: Jassi Pierre MD;  [facility];  Service: Pain Management;  Laterality: Right;    INJECTION OF ANESTHETIC AGENT INTO SACROILIAC JOINT Right 2022    Procedure: Right SIJ Injection Right L5/S1 Facte Injection;  Surgeon: Jassi Pierre MD;  [facility];  Service: Pain Management;  Laterality: Right;    INSERTION OF TUNNELED CENTRAL VENOUS CATHETER (CVC) WITH SUBCUTANEOUS PORT N/A 2021    Procedure: [id number]-I-CATH;  Surgeon: Christoph Douglas MD;  Location: [facility] OR;  Service: General;  Laterality: N/A;    RADIOFREQUENCY THERMOCOAGULATION Right 2022    Procedure: Right L4/L5 and L5/S1 Lumbar RFA;  Surgeon: Jassi Pierre MD;  Location: [facility] PAIN MGT;  Service: Pain Management;  Laterality: Right;    REMOVAL OF VASCULAR ACCESS PORT      SENTINEL LYMPH NODE BIOPSY Left 10/12/2021    Procedure: BIOPSY, LYMPH NODE, SENTINEL;  Surgeon: Christoph Douglas MD;  Location: [facility] OR;  Service: General;  Laterality: Left;    TOE SURGERY       Social History     Socioeconomic History    Marital status: Single    Number of children: 2    Highest education level: 11th grade   Occupational History    Occupation: Retired   Tobacco Use    Smoking status: Never    Smokeless tobacco: Never   Substance and Sexual Activity    Alcohol use: Never     Alcohol/week: 0.0 standard drinks    Drug use: No    Sexual activity: Not Currently     Partners: Male     Birth control/protection: See Surgical Hx   Other Topics Concern    Are you pregnant or think you may be? No    Breast-feeding No   Social History Narrative    Single. 2 children , 1  at 30 yoa   strep throat -   pneumonia and renal complications after not completing course of AB. Other child lives in [place], [place]. Has a cousin locally that could help in an emergency. 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 Caffeine intake =- 1 cola a day. No coffee, + occasional tea, avoids caffeine especially at night. Still drives. She does not have a Living Will or Advanced directive.      Family History   Problem Relation Age of Onset    Cancer Mother 38        breast    Breast cancer Mother     Breast cancer Maternal Grandmother     Heart disease Maternal Grandmother     Hypertension Son     Cataracts Cousin     Diabetes Neg Hx     Stroke Neg Hx     Kidney disease Neg Hx     Asthma Neg Hx     COPD Neg Hx     Melanoma Neg Hx     Hyperlipidemia Neg Hx        Review of patient's allergies indicates:   Allergen Reactions    Lisinopril Swelling and Rash    Augmentin [amoxicillin-pot clavulanate] Diarrhea       Current Outpatient Medications   Medication Sig    amLODIPine (NORVASC) 2.5 MG tablet Take 1 tablet (2.5 mg total) by mouth once daily.    aspirin (ECOTRIN) 81 MG EC tablet Take 1 tablet (81 mg total) by mouth once daily.    biotin 10,000 mcg Cap Take 1 tablet by mouth once daily.    busPIRone (BUSPAR) 10 MG tablet Take 1 tablet (10 mg total) by mouth once daily.    calcitonin, salmon, (FORTICAL) 200 unit/actuation nasal spray 1 spray by Nasal route once daily.    carvediloL (COREG) 6.25 MG tablet Take 1 tablet (6.25 mg total) by mouth 2 (two) times daily with meals.    cycloSPORINE modified, NEORAL, (NEORAL) 25 MG capsule TAKE 3 CAPSULES (75 MG TOTAL) BY MOUTH 2 (TWO) TIMES DAILY.    DULoxetine (CYMBALTA) 30 MG capsule TAKE 1 CAPSULE EVERY DAY    EVENING PRIMROSE OIL ORAL Take 1,000 mg by mouth once daily.    hydrALAZINE (APRESOLINE) 50 MG tablet Take 1 tablet (50 mg total) by mouth every 8 (eight) hours. TAKE 1 TABLETS  EVERY 8  HOURS.    LIDOcaine (LIDODERM) 5 % Place 2 patches onto the skin once  daily. Remove & Discard patch within 12 hours or as directed by MD    multivitamin capsule Take 1 capsule by mouth once daily.    pantoprazole (PROTONIX) 40 MG tablet TAKE 1 TABLET EVERY DAY    RETACRIT 20,000 unit/mL injection     temazepam (RESTORIL) 30 mg capsule TAKE 1 CAPSULE AT BEDTIME    tiZANidine (ZANAFLEX) 4 MG tablet TAKE 1 TABLET TWICE DAILY AS NEEDED    vitamin E 400 UNIT capsule Take 400 Units by mouth once daily.    zolpidem (AMBIEN) 10 mg Tab TAKE 1 TABLET EVERY EVENING    furosemide (LASIX) 20 MG tablet Take 1 tablet (20 mg total) by mouth 2 (two) times a day for 5 days, THEN 1 tablet (20 mg total) once daily. Take 1 tablet daily.    oxyCODONE-acetaminophen (PERCOCET) 7.5-325 mg per tablet Take 1 tablet by mouth every 8 (eight) hours as needed for Pain.     Current Facility-Administered Medications   Medication    denosumab (PROLIA) injection 60 mg     Facility-Administered Medications Ordered in Other Visits   Medication    lactated ringers infusion         ROS  Review of Systems   Constitutional:  Negative for chills, diaphoresis, fatigue and fever.   Respiratory:  Negative for chest tightness, shortness of breath, wheezing and stridor.    Cardiovascular:  Positive for leg swelling. Negative for chest pain.   Gastrointestinal:  Negative for blood in stool, diarrhea, nausea and vomiting.   Endocrine: Negative for cold intolerance and heat intolerance.   Genitourinary:  Negative for dysuria, hematuria and urgency.   Musculoskeletal:  Positive for arthralgias, back pain and myalgias. Negative for gait problem, joint swelling, neck pain and neck stiffness.   Skin:  Negative for rash.   Neurological:  Negative for tremors, seizures, speech difficulty, weakness, light-headedness, numbness and headaches.   Hematological:  Does not bruise/bleed easily.   Psychiatric/Behavioral:  Negative for agitation, confusion and suicidal ideas. The patient is not nervous/anxious.           OBJECTIVE:  Resp 17   Ht 5'  "2" (1.575 m)   Wt 74 kg (163 lb 2.3 oz)   LMP 06/20/1983 (Within Years)   BMI 29.84 kg/m²         Physical Exam  Constitutional:       General: She is not in acute distress.     Appearance: Normal appearance. She is not ill-appearing.   HENT:      Head: Normocephalic and atraumatic.      Nose: No congestion or rhinorrhea.      Mouth/Throat:      Mouth: Mucous membranes are moist.   Eyes:      Extraocular Movements: Extraocular movements intact.      Pupils: Pupils are equal, round, and reactive to light.   Cardiovascular:      Pulses: Normal pulses.   Pulmonary:      Effort: Pulmonary effort is normal.   Abdominal:      General: Abdomen is flat.      Palpations: Abdomen is soft.   Musculoskeletal:      Cervical back: Normal range of motion and neck supple.   Skin:     General: Skin is warm and dry.      Capillary Refill: Capillary refill takes less than 2 seconds.   Neurological:      General: No focal deficit present.      Mental Status: She is alert and oriented to person, place, and time.      Cranial Nerves: No cranial nerve deficit.      Sensory: No sensory deficit.      Motor: No weakness or abnormal muscle tone.      Gait: Gait normal.      Deep Tendon Reflexes: Babinski sign absent on the right side. Babinski sign absent on the left side.      Reflex Scores:       Patellar reflexes are 2+ on the right side and 2+ on the left side.       Achilles reflexes are 2+ on the right side and 2+ on the left side.  Psychiatric:         Mood and Affect: Mood normal.         Behavior: Behavior normal.         Thought Content: Thought content normal.         Musculoskeletal:    Thoracic Exam  Incision: no  Pain with Flexion: yes  Pain with Extension: yes  Facet TTP:  T11-12, right greater than left  ROM:  Decreased    Lumbar Exam  Incision: no  Pain with Flexion: yes  Pain with Extension: yes  ROM:  Decreased  Paraspinous TTP:  Right-greater-than-left      LABS:  Lab Results   Component Value Date    WBC 3.60 (L) " 01/04/2023    HGB 9.3 (L) 01/04/2023    HCT 28.5 (L) 01/04/2023    MCV 88 01/04/2023     01/04/2023       CMP  Sodium   Date Value Ref Range Status   01/04/2023 138 136 - 145 mmol/L Final     Potassium   Date Value Ref Range Status   01/04/2023 4.3 3.5 - 5.1 mmol/L Final     Chloride   Date Value Ref Range Status   01/04/2023 106 95 - 110 mmol/L Final     CO2   Date Value Ref Range Status   01/04/2023 20 (L) 23 - 29 mmol/L Final     Glucose   Date Value Ref Range Status   01/04/2023 108 70 - 110 mg/dL Final     BUN   Date Value Ref Range Status   01/04/2023 31 (H) 8 - 23 mg/dL Final     Creatinine   Date Value Ref Range Status   01/04/2023 2.7 (H) 0.5 - 1.4 mg/dL Final     Calcium   Date Value Ref Range Status   01/04/2023 8.7 8.7 - 10.5 mg/dL Final     Total Protein   Date Value Ref Range Status   01/04/2023 7.7 6.0 - 8.4 g/dL Final     Albumin   Date Value Ref Range Status   01/04/2023 3.3 (L) 3.5 - 5.2 g/dL Final   01/04/2023 3.3 (L) 3.5 - 5.2 g/dL Final     Total Bilirubin   Date Value Ref Range Status   01/04/2023 0.5 0.1 - 1.0 mg/dL Final     Comment:     For infants and newborns, interpretation of results should be based  on gestational age, weight and in agreement with clinical  observations.    Premature Infant recommended reference ranges:  Up to 24 hours.............<8.0 mg/dL  Up to 48 hours............<12.0 mg/dL  3-5 days..................<15.0 mg/dL  6-29 days.................<15.0 mg/dL       Alkaline Phosphatase   Date Value Ref Range Status   01/04/2023 137 (H) 55 - 135 U/L Final     AST   Date Value Ref Range Status   01/04/2023 36 10 - 40 U/L Final     ALT   Date Value Ref Range Status   01/04/2023 21 10 - 44 U/L Final     Anion Gap   Date Value Ref Range Status   01/04/2023 12 8 - 16 mmol/L Final     eGFR if    Date Value Ref Range Status   07/14/2022 19 (A) >60 mL/min/1.73 m^2 Final     eGFR if non    Date Value Ref Range Status   07/14/2022 17 (A) >60  mL/min/1.73 m^2 Final     Comment:     Calculation used to obtain the estimated glomerular filtration  rate (eGFR) is the CKD-EPI equation.          Lab Results   Component Value Date    HGBA1C 5.2 12/24/2021             ASSESSMENT:       67 y.o. year old female with lower back pain, consistent with     1. Thoracic radiculopathy  IR HELLEN Cervical/THoracic w/ Img    Case Request-RAD/Other Procedure Area: T11/T12 IL HELLEN    oxyCODONE-acetaminophen (PERCOCET) 7.5-325 mg per tablet      2. Thoracic spondylosis        3. Lumbar spondylosis        4. DDD (degenerative disc disease), lumbar          Thoracic radiculopathy  -     IR HELLEN Cervical/THoracic w/ Img; Future; Expected date: 01/13/2023  -     Case Request-RAD/Other Procedure Area: T11/T12 IL HELLEN  -     oxyCODONE-acetaminophen (PERCOCET) 7.5-325 mg per tablet; Take 1 tablet by mouth every 8 (eight) hours as needed for Pain.  Dispense: 21 tablet; Refill: 0    Thoracic spondylosis    Lumbar spondylosis    DDD (degenerative disc disease), lumbar             PLAN:   - Interventions:    Schedule patient for T11-12 interlaminar epidural steroid injection for acute thoracic radiculopathy  -12/7/22:  Right L4-5 and L5-S1 radiofrequency ablation, 75% relief  -8/22/22:  Right sacroiliac joint and right L5-S1 facet injection,   Resolution of right lower extremity pain  - Anticoagulation use:   no no anticoagulation    - Medications:   Continue Tylenol, Cymbalta, and Lyrica as prescribed by primary care   Start Percocet 7.5mg PO q8h PRN, #21, no refills, 1/13/22, for acute onset thoracic radiculopathy    - Therapy:    Continue formal physical therapy    - Imaging:   CT of thoracic spine reviewed and findings discussed with patient.  Significant for right eccentric disc bulge at T11-12 with foraminal stenosis   X-ray Lumbar  reviewed. Significant for grade 1 anterolisthesis of L4 upon L5.  Degenerative disc disease at T11-T12.  As well as pseudo joint formation of right L5  transverse process with superior sacrum     - Consults:   Continue follow-up with cardiology for previous heart transplant    - Counseled patient regarding the importance of activity modification and physical therapy    - Patient Questions: Answered all of the patient's questions regarding diagnosis, therapy, and treatment    - Follow up visit:  Return to clinic 4 weeks after procedure      The above plan and management options were discussed at length with patient. Patient is in agreement with the above and verbalized understanding.    I discussed the goals of interventional chronic pain management with the patient on today's visit.  I explained the utility of injections for diagnostic and therapeutic purposes.  We discussed a multimodal approach to pain including treating the patient's given worst pain at any given time.  We will use a systematic approach to addressing pain.  We will also adopt a multimodal approach that includes injections, adjuvant medications, physical therapy, at times psychiatry.  There may be a limited role for opioid use intermittently in the treatment of pain, more particularly for acute pain although no one approach can be used as a sole treatment modality.    I emphasized the importance of regular exercise, core strengthening and stretching, diet and weight loss as a cornerstone of long-term pain management.      Jassi Pierre MD  Interventional Pain Management  [facility]    Disclaimer:  This note was prepared using voice recognition system and is likely to have sound alike errors that may have been overlooked even after proof reading.  Please call me with any questions

## 2023-01-19 ENCOUNTER — OFFICE VISIT (OUTPATIENT)
Dept: SURGERY | Facility: CLINIC | Age: 68
End: 2023-01-19
Payer: MEDICARE

## 2023-01-19 VITALS
BODY MASS INDEX: 30.85 KG/M2 | DIASTOLIC BLOOD PRESSURE: 100 MMHG | WEIGHT: 168.63 LBS | HEART RATE: 101 BPM | SYSTOLIC BLOOD PRESSURE: 166 MMHG

## 2023-01-19 DIAGNOSIS — K59.00 CONSTIPATION, UNSPECIFIED CONSTIPATION TYPE: Primary | ICD-10-CM

## 2023-01-19 DIAGNOSIS — K43.9 VENTRAL HERNIA WITHOUT OBSTRUCTION OR GANGRENE: ICD-10-CM

## 2023-01-19 PROCEDURE — 3080F DIAST BP >= 90 MM HG: CPT | Mod: HCNC,CPTII,S$GLB, | Performed by: SURGERY

## 2023-01-19 PROCEDURE — 1125F AMNT PAIN NOTED PAIN PRSNT: CPT | Mod: HCNC,CPTII,S$GLB, | Performed by: SURGERY

## 2023-01-19 PROCEDURE — 99999 PR PBB SHADOW E&M-EST. PATIENT-LVL V: CPT | Mod: PBBFAC,HCNC,, | Performed by: SURGERY

## 2023-01-19 PROCEDURE — 1125F PR PAIN SEVERITY QUANTIFIED, PAIN PRESENT: ICD-10-PCS | Mod: HCNC,CPTII,S$GLB, | Performed by: SURGERY

## 2023-01-19 PROCEDURE — 3077F SYST BP >= 140 MM HG: CPT | Mod: HCNC,CPTII,S$GLB, | Performed by: SURGERY

## 2023-01-19 PROCEDURE — 1157F PR ADVANCE CARE PLAN OR EQUIV PRESENT IN MEDICAL RECORD: ICD-10-PCS | Mod: HCNC,CPTII,S$GLB, | Performed by: SURGERY

## 2023-01-19 PROCEDURE — 1159F MED LIST DOCD IN RCRD: CPT | Mod: HCNC,CPTII,S$GLB, | Performed by: SURGERY

## 2023-01-19 PROCEDURE — 3077F PR MOST RECENT SYSTOLIC BLOOD PRESSURE >= 140 MM HG: ICD-10-PCS | Mod: HCNC,CPTII,S$GLB, | Performed by: SURGERY

## 2023-01-19 PROCEDURE — 1157F ADVNC CARE PLAN IN RCRD: CPT | Mod: HCNC,CPTII,S$GLB, | Performed by: SURGERY

## 2023-01-19 PROCEDURE — 99999 PR PBB SHADOW E&M-EST. PATIENT-LVL V: ICD-10-PCS | Mod: PBBFAC,HCNC,, | Performed by: SURGERY

## 2023-01-19 PROCEDURE — 1160F RVW MEDS BY RX/DR IN RCRD: CPT | Mod: HCNC,CPTII,S$GLB, | Performed by: SURGERY

## 2023-01-19 PROCEDURE — 3008F BODY MASS INDEX DOCD: CPT | Mod: HCNC,CPTII,S$GLB, | Performed by: SURGERY

## 2023-01-19 PROCEDURE — 1160F PR REVIEW ALL MEDS BY PRESCRIBER/CLIN PHARMACIST DOCUMENTED: ICD-10-PCS | Mod: HCNC,CPTII,S$GLB, | Performed by: SURGERY

## 2023-01-19 PROCEDURE — 99215 PR OFFICE/OUTPT VISIT, EST, LEVL V, 40-54 MIN: ICD-10-PCS | Mod: HCNC,S$GLB,, | Performed by: SURGERY

## 2023-01-19 PROCEDURE — 3008F PR BODY MASS INDEX (BMI) DOCUMENTED: ICD-10-PCS | Mod: HCNC,CPTII,S$GLB, | Performed by: SURGERY

## 2023-01-19 PROCEDURE — 1159F PR MEDICATION LIST DOCUMENTED IN MEDICAL RECORD: ICD-10-PCS | Mod: HCNC,CPTII,S$GLB, | Performed by: SURGERY

## 2023-01-19 PROCEDURE — 99215 OFFICE O/P EST HI 40 MIN: CPT | Mod: HCNC,S$GLB,, | Performed by: SURGERY

## 2023-01-19 PROCEDURE — 3080F PR MOST RECENT DIASTOLIC BLOOD PRESSURE >= 90 MM HG: ICD-10-PCS | Mod: HCNC,CPTII,S$GLB, | Performed by: SURGERY

## 2023-01-19 NOTE — PROGRESS NOTES
General Surgery Clinic  Follow-Up    Patient Name: Nadia Damon  YOB: 1955 (67 y.o.)  MRN: [id number]  Today's Date: 01/19/2023    Referring Md:   Luz Dickson, Np  [address] [place] [id number]    SUBJECTIVE:     Chief Complaint:  Ventral hernia    History of Present Illness:  Nadia Damon is a 67 y.o. female with past medical history of heart transplant status post left breast lumpectomy 09/10/2021 with sentinel lymph node biopsy 10/12/2021 status post Port-A-Cath placement 11/21 presents to discuss ventral hernia.  She is been having right lower abdominal pain described as burning radiating from her back around her right side.  She is undergoing treatment with pain management.  She also reports constipation taking laxatives as needed but not taking consistent dietary modifications or medication to assist with constipation.    Review of patient's allergies indicates:   Allergen Reactions    Lisinopril Swelling and Rash    Augmentin [amoxicillin-pot clavulanate] Diarrhea       Past Medical History:   Diagnosis Date    Abdominal wall hernia     CT Renal 6/11/2018---Small fat containing superior ventral abdominal wall hernia at the epicardial pacing lead site.    Abnormal mammogram 10/12/2021    Anxiety     Arthritis     ZEN HIPS    Breast cancer in female 08/2021    LEFT BREAST    C. difficile colitis 11/29/2021    Cellulitis of axilla, left 12/23/2021    Chronic diastolic heart failure 12/16/2021    Chronic kidney disease     stage 4, GFR 15-29 ml/min    Chronic midline low back pain without sciatica 06/18/2018    Closed nondisplaced fracture of distal phalanx of left great toe with routine healing 10/22/2018    Coronary artery disease 1993    heart transplant    Cystitis 5/10/2022    Depression     Fibromyalgia     on Lyrica    Heart failure     native heart cardiomyopathy    Heart transplanted 1993    due to cardiomyopathy    History of hyperparathyroidism;  Hyperparathyroidism, secondary renal     PT DENIES    Hypertension     Immune disorder     anti rejection meds    Iron deficiency anemia 08/15/2017    Kidney stones     passed per pt    Obesity     Other osteoporosis without current pathological fracture 08/30/2019    Severe sepsis 11/22/2021    Shingles 2003 approx    left leg    Trouble in sleeping     Urinary incontinence      Past Surgical History:   Procedure Laterality Date    BLADDER SURGERY  2015 approx    mesh - Dr Everett then 2nd reconstructive sx Dr Onofre    BREAST BIOPSY Bilateral     NEGATIVE    BREAST BIOPSY Right 10/31/2022    benign    BREAST LUMPECTOMY Left 2021    BREAST SURGERY Left 09/28/2015    Bx - benign    BREAST SURGERY Right 12/2015    Bx benign    CARDIAC PACEMAKER REMOVAL Left 06/26/2014    Pacer defirillator removed. Put in 1993 aat time of heart transplant    CARPAL TUNNEL RELEASE Left 03/03/2015    Dr. Hall    COLONOSCOPY N/A 02/25/2021    Procedure: COLONOSCOPY;  Surgeon: Freida Ramirez MD;  Location: [facility] ENDO;  Service: Endoscopy;  Laterality: N/A;    HEART TRANSPLANT  1993    HERNIA REPAIR Right 1971 approx    Inguinal    HYSTERECTOMY  1983    vag hyst /LSO     INCISION AND DRAINAGE OF ABSCESS Left 12/24/2021    Procedure: INCISION AND DRAINAGE, ABSCESS;  Surgeon: Joseph Longo MD;  Location: [facility] OR;  Service: General;  Laterality: Left;    INJECTION OF ANESTHETIC AGENT AROUND MEDIAL BRANCH NERVES INNERVATING LUMBAR FACET JOINT Right 10/19/2022    Procedure: Right L4/L5 and L5/S1 MBB;  Surgeon: Jassi Pierre MD;  Location: [facility] PAIN MGT;  Service: Pain Management;  Laterality: Right;    INJECTION OF ANESTHETIC AGENT AROUND MEDIAL BRANCH NERVES INNERVATING LUMBAR FACET JOINT Right 11/09/2022    Procedure: Right L4/L5 and L5/S1 MBB;  Surgeon: Jassi Pierre MD;  Location: [facility] PAIN MGT;  Service: Pain Management;  Laterality: Right;    INJECTION OF ANESTHETIC AGENT INTO SACROILIAC JOINT Right 08/22/2022     Procedure: Right SIJ Injection Right L5/S1 Facte Injection;  Surgeon: Jassi Pierre MD;  Location: [facility] PAIN MGT;  Service: Pain Management;  Laterality: Right;    INSERTION OF TUNNELED CENTRAL VENOUS CATHETER (CVC) WITH SUBCUTANEOUS PORT N/A 11/09/2021    Procedure: [id number];  Surgeon: Christoph Douglas MD;  Location: [facility] OR;  Service: General;  Laterality: N/A;    RADIOFREQUENCY THERMOCOAGULATION Right 12/7/2022    Procedure: Right L4/L5 and L5/S1 Lumbar RFA;  Surgeon: Jassi Pierre MD;  Location: [facility] PAIN MGT;  Service: Pain Management;  Laterality: Right;    REMOVAL OF VASCULAR ACCESS PORT      SENTINEL LYMPH NODE BIOPSY Left 10/12/2021    Procedure: BIOPSY, LYMPH NODE, SENTINEL;  Surgeon: Christoph Douglas MD;  Location: [facility] OR;  Service: General;  Laterality: Left;    TOE SURGERY       Family History   Problem Relation Age of Onset    Cancer Mother 38        breast    Breast cancer Mother     Breast cancer Maternal Grandmother     Heart disease Maternal Grandmother     Hypertension Son     Cataracts Cousin     Diabetes Neg Hx     Stroke Neg Hx     Kidney disease Neg Hx     Asthma Neg Hx     COPD Neg Hx     Melanoma Neg Hx     Hyperlipidemia Neg Hx      Social History     Tobacco Use    Smoking status: Never    Smokeless tobacco: Never   Substance Use Topics    Alcohol use: Never     Alcohol/week: 0.0 standard drinks    Drug use: No        Review of Systems:  Review of Systems   Constitutional:  Negative for chills and fever.   HENT:  Negative for congestion and sore throat.    Respiratory:  Negative for cough and shortness of breath.    Cardiovascular:  Negative for chest pain and leg swelling.   Gastrointestinal:  Negative for abdominal pain, nausea and vomiting.   Genitourinary:  Negative for dysuria.   Musculoskeletal:  Negative for myalgias.   Skin:  Negative for rash.   Neurological:  Negative for weakness and headaches.     OBJECTIVE:     Vital Signs (Most Recent)  BP (!) 166/100   Pulse  101   Wt 76.5 kg (168 lb 10.4 oz)   LMP 06/20/1983 (Within Years)   BMI 30.85 kg/m²     Physical Exam  Constitutional:       Appearance: She is well-developed.   HENT:      Head: Normocephalic and atraumatic.      Right Ear: External ear normal.      Left Ear: External ear normal.      Mouth/Throat:      Pharynx: No oropharyngeal exudate.   Eyes:      General: No scleral icterus.        Right eye: No discharge.         Left eye: No discharge.      Conjunctiva/sclera: Conjunctivae normal.      Pupils: Pupils are equal, round, and reactive to light.   Neck:      Thyroid: No thyromegaly.   Cardiovascular:      Rate and Rhythm: Normal rate.   Pulmonary:      Effort: Pulmonary effort is normal.   Chest:   Breasts:     Right: No inverted nipple, mass, nipple discharge, skin change or tenderness.      Left: No inverted nipple, mass, nipple discharge, skin change or tenderness.       Abdominal:      Palpations: Abdomen is soft.   Musculoskeletal:         General: Normal range of motion.      Right shoulder: No crepitus. Normal strength.      Cervical back: Normal range of motion and neck supple.   Lymphadenopathy:      Head:      Right side of head: No submental, submandibular, tonsillar, preauricular, posterior auricular or occipital adenopathy.      Left side of head: No submental, submandibular, tonsillar, preauricular, posterior auricular or occipital adenopathy.      Cervical: No cervical adenopathy.      Right cervical: No superficial or posterior cervical adenopathy.     Left cervical: No superficial or posterior cervical adenopathy.      Upper Body:      Right upper body: No supraclavicular adenopathy.      Left upper body: No supraclavicular adenopathy.   Skin:     General: Skin is warm and dry.      Coloration: Skin is not pale.      Findings: No erythema or rash.   Neurological:      Mental Status: She is alert and oriented to person, place, and time.      Deep Tendon Reflexes: Reflexes are normal and  symmetric.   Psychiatric:         Behavior: Behavior normal.         Thought Content: Thought content normal.         Judgment: Judgment normal.         ASSESSMENT/PLAN:     Nadia Damon is a 66 y.o. female status post left breast lumpectomy/sentinel lymph node biopsy    Ventral hernia    Hernias asymptomatic and unrelated to the pain   Discussed if it were to become symptomatic in the future can consider repair however with other medical conditions, heart transplant and not affecting her current pain she would like to observe at this time.  Continue follow-up with pain management  Referral gastroenterology for constipation    45 minutes of total time spent on the encounter, which includes face to face time and non-face to face time preparing to see the patient (eg, review of tests), Obtaining and/or reviewing separately obtained history, Documenting clinical information in the electronic or other health record, Independently interpreting results (not separately reported) and communicating results to the patient/family/caregiver, or Care coordination (not separately reported).

## 2023-01-23 DIAGNOSIS — I10 ESSENTIAL HYPERTENSION: ICD-10-CM

## 2023-01-23 RX ORDER — HYDRALAZINE HYDROCHLORIDE 50 MG/1
50 TABLET, FILM COATED ORAL EVERY 8 HOURS
Qty: 270 TABLET | Refills: 3 | Status: SHIPPED | OUTPATIENT
Start: 2023-01-23 | End: 2023-10-05

## 2023-01-24 ENCOUNTER — PATIENT MESSAGE (OUTPATIENT)
Dept: TRANSPLANT | Facility: CLINIC | Age: 68
End: 2023-01-24
Payer: MEDICARE

## 2023-01-25 ENCOUNTER — APPOINTMENT (OUTPATIENT)
Dept: RADIOLOGY | Facility: HOSPITAL | Age: 68
End: 2023-01-25
Attending: INTERNAL MEDICINE
Payer: MEDICARE

## 2023-01-25 DIAGNOSIS — M81.0 OSTEOPOROSIS, UNSPECIFIED OSTEOPOROSIS TYPE, UNSPECIFIED PATHOLOGICAL FRACTURE PRESENCE: ICD-10-CM

## 2023-01-25 PROCEDURE — 77080 DEXA BONE DENSITY SPINE HIP: ICD-10-PCS | Mod: 26,HCNC,, | Performed by: RADIOLOGY

## 2023-01-25 PROCEDURE — 77080 DXA BONE DENSITY AXIAL: CPT | Mod: 26,HCNC,, | Performed by: RADIOLOGY

## 2023-01-25 PROCEDURE — 77080 DXA BONE DENSITY AXIAL: CPT | Mod: TC,HCNC

## 2023-01-26 NOTE — PRE-PROCEDURE INSTRUCTIONS
Spoke with patient regarding procedure scheduled on 2.2    Arrival time 1000    Has patient been sick with fever or on antibiotics within the last 7 days? No    Does the patient have any open wounds, sores or rashes? No    Does the patient have any recent fractures? no    Has patient received a vaccination within the last 7 days? No    Received the COVID vaccination? yes    Has the patient stopped all medications as directed? NA    Does patient have a pacemaker and or defibrillator? no    Does the patient have a ride to and from procedure and someone reliable to remain with patient? ike    Is the patient diabetic? no    Does the patient have sleep apnea? Or use O2 at home? No and no     Is the patient receiving sedation? yes    Is the patient instructed to remain NPO beginning at midnight the night before their procedure? yes    Procedure location confirmed with patient? Yes    Covid- Denies signs/symptoms. Instructed to notify PAT/MD if any changes.

## 2023-02-02 ENCOUNTER — HOSPITAL ENCOUNTER (OUTPATIENT)
Facility: HOSPITAL | Age: 68
Discharge: HOME | End: 2023-02-02
Attending: ANESTHESIOLOGY | Admitting: ANESTHESIOLOGY
Payer: MEDICARE

## 2023-02-02 VITALS
TEMPERATURE: 98 F | OXYGEN SATURATION: 98 % | HEART RATE: 89 BPM | SYSTOLIC BLOOD PRESSURE: 122 MMHG | HEIGHT: 62 IN | RESPIRATION RATE: 16 BRPM | BODY MASS INDEX: 30.2 KG/M2 | WEIGHT: 164.13 LBS | DIASTOLIC BLOOD PRESSURE: 68 MMHG

## 2023-02-02 DIAGNOSIS — M54.14 THORACIC RADICULOPATHY: Primary | ICD-10-CM

## 2023-02-02 PROCEDURE — 25500020 PHARM REV CODE 255: Mod: HCNC | Performed by: ANESTHESIOLOGY

## 2023-02-02 PROCEDURE — 63600175 PHARM REV CODE 636 W HCPCS: Mod: HCNC | Performed by: ANESTHESIOLOGY

## 2023-02-02 PROCEDURE — 25000003 PHARM REV CODE 250: Mod: HCNC | Performed by: ANESTHESIOLOGY

## 2023-02-02 PROCEDURE — 62321 NJX INTERLAMINAR CRV/THRC: CPT | Mod: HCNC,,, | Performed by: ANESTHESIOLOGY

## 2023-02-02 PROCEDURE — 62321 PR INJ CERV/THORAC, W/GUIDANCE: ICD-10-PCS | Mod: HCNC,,, | Performed by: ANESTHESIOLOGY

## 2023-02-02 PROCEDURE — 62321 NJX INTERLAMINAR CRV/THRC: CPT | Mod: HCNC | Performed by: ANESTHESIOLOGY

## 2023-02-02 RX ORDER — BETAMETHASONE SODIUM PHOSPHATE AND BETAMETHASONE ACETATE 3; 3 MG/ML; MG/ML
INJECTION, SUSPENSION INTRA-ARTICULAR; INTRALESIONAL; INTRAMUSCULAR; SOFT TISSUE
Status: DISCONTINUED | OUTPATIENT
Start: 2023-02-02 | End: 2023-02-02 | Stop reason: HOSPADM

## 2023-02-02 RX ORDER — LIDOCAINE HYDROCHLORIDE 10 MG/ML
INJECTION, SOLUTION EPIDURAL; INFILTRATION; INTRACAUDAL; PERINEURAL
Status: DISCONTINUED | OUTPATIENT
Start: 2023-02-02 | End: 2023-02-02 | Stop reason: HOSPADM

## 2023-02-02 RX ORDER — FENTANYL CITRATE 50 UG/ML
INJECTION, SOLUTION INTRAMUSCULAR; INTRAVENOUS
Status: DISCONTINUED | OUTPATIENT
Start: 2023-02-02 | End: 2023-02-02 | Stop reason: HOSPADM

## 2023-02-02 RX ORDER — ONDANSETRON 2 MG/ML
4 INJECTION INTRAMUSCULAR; INTRAVENOUS ONCE
Status: COMPLETED | OUTPATIENT
Start: 2023-02-02 | End: 2023-02-02

## 2023-02-02 RX ORDER — MIDAZOLAM HYDROCHLORIDE 1 MG/ML
INJECTION, SOLUTION INTRAMUSCULAR; INTRAVENOUS
Status: DISCONTINUED | OUTPATIENT
Start: 2023-02-02 | End: 2023-02-02 | Stop reason: HOSPADM

## 2023-02-02 RX ADMIN — ONDANSETRON HYDROCHLORIDE 4 MG: 2 SOLUTION INTRAMUSCULAR; INTRAVENOUS at 10:02

## 2023-02-02 NOTE — OP NOTE
Thoracic Interlaminar Epidural Steroid Injection under Fluoroscopic Guidance.     INFORMED CONSENT: The procedure, risks, benefits and options were discussed with patient. There are no contraindications to the procedure. The patient expressed understanding and agreed to proceed. The personnel performing the procedure was discussed.    Date of procedure 02/02/2023    Time-out taken to identify patient and procedure side prior to starting the procedure.                     PROCEDURE:    T11/T12  Thoracic Interlaminar Epidural Steroid Injection Under Fluoroscopic Guidance.     Pre-Op diagnosis: Thoracic radiculopathy [M54.14]    Post-Op diagnosis: Thoracic radiculopathy [M54.14]    PHYSICIAN: Jassi Pierre MD    ASSISTANTS: None     ESTIMATED BLOOD LOSS: none.     COMPLICATIONS: none.     SPECIMENS: none    Sedation: Conscious sedation provided by M.D    SEDATION MEDICATIONS: local/IV sedation: Versed 2 mg and fentanyl 100 mcg IV.  Conscious sedation ordered by MD.  Patient reevaluated and sedation administered by MD and monitored by RN.  Total sedation time was less than 10 min.      TECHNIQUE: With the patient laying in a prone position, the area was prepped and draped in the usual sterile fashion using ChloraPrep and a fenestrated drape. 1% lidocaine was given using a 27-gauge needle by raising a wheal and going down to the hub of the needle over the  T11/T12  interlaminar space.  The interlaminar space was then approached with a 3.5 inch 18-gauge Touhy needle was introduced under fluoroscopic guidance in the AP and Lateral view. Once the Ligamentum flavum was encountered loss of resistance to saline was used to enter the epidural space. With positive loss of resistance and negative CSF or Blood, 3mL contrast dye Omnipaque (300mg/ml) was injected to confirm placement and there was no vascular runoff. 2ml of Betamethasone PF 6mg/ml and 3ml of Lidocaine PF 1%. Displacement of the radiopaque contrast after  injection of the medication confirmed that the medication went into the area of the epidural space.  The patient tolerated the procedure well.       The patient was monitored for approximately 30 minutes after the procedure.  Patient was given post procedure and discharge instructions to follow at home.  The patient was discharged in a stable condition

## 2023-02-02 NOTE — DISCHARGE INSTRUCTIONS

## 2023-02-02 NOTE — PLAN OF CARE
Pt and friend verbalized understanding of discharge instructions. Band aid x 1 to medial back c/d/i. Patient voiced no complaints, with no further questions at this time. Patient stood at side of bed, walked steps with no new motor deficits. Neuro intact. VSS on RA. Recovery care complete.

## 2023-02-02 NOTE — DISCHARGE SUMMARY
Discharge Note  Short Stay      SUMMARY     Admit Date: 2/2/2023    Attending Physician: Jassi Pierre MD        Discharge Physician: Jassi Pierre MD        Discharge Date: 2/2/2023 11:50 AM    Procedure(s) (LRB):  T11/T12 IL HELLEN (N/A)    Final Diagnosis: Thoracic radiculopathy [M54.14]    Disposition: Home or self care    Patient Instructions:   Current Discharge Medication List        CONTINUE these medications which have NOT CHANGED    Details   amLODIPine (NORVASC) 2.5 MG tablet Take 1 tablet (2.5 mg total) by mouth once daily.  Qty: 90 tablet, Refills: 3      aspirin (ECOTRIN) 81 MG EC tablet Take 1 tablet (81 mg total) by mouth once daily.  Qty: 90 tablet, Refills: 3      carvediloL (COREG) 6.25 MG tablet Take 1 tablet (6.25 mg total) by mouth 2 (two) times daily with meals.  Qty: 180 tablet, Refills: 3      biotin 10,000 mcg Cap Take 1 tablet by mouth once daily.      busPIRone (BUSPAR) 10 MG tablet Take 1 tablet (10 mg total) by mouth once daily.  Qty: 90 tablet, Refills: 3    Associated Diagnoses: Anxiety      calcitonin, salmon, (FORTICAL) 200 unit/actuation nasal spray 1 spray by Nasal route once daily.  Qty: 3 each, Refills: 3    Associated Diagnoses: Other osteoporosis without current pathological fracture      cycloSPORINE modified, NEORAL, (NEORAL) 25 MG capsule TAKE 3 CAPSULES (75 MG TOTAL) BY MOUTH 2 (TWO) TIMES DAILY.  Qty: 540 capsule, Refills: 6    Associated Diagnoses: Heart transplanted      DULoxetine (CYMBALTA) 30 MG capsule TAKE 1 CAPSULE EVERY DAY  Qty: 90 capsule, Refills: 1    Comments: To start once complete duloxetine prescription sent to local pharmacy today  Associated Diagnoses: Fibromyalgia      EVENING PRIMROSE OIL ORAL Take 1,000 mg by mouth once daily.      furosemide (LASIX) 20 MG tablet Take 1 tablet (20 mg total) by mouth once daily.  Qty: 90 tablet, Refills: 1    Associated Diagnoses: Heart transplanted; Essential hypertension      hydrALAZINE (APRESOLINE) 50 MG  tablet Take 1 tablet (50 mg total) by mouth every 8 (eight) hours. TAKE 1 TABLETS  EVERY 8  HOURS.  Qty: 270 tablet, Refills: 3    Comments: .  Associated Diagnoses: Essential hypertension      LIDOcaine (LIDODERM) 5 % Place 2 patches onto the skin once daily. Remove & Discard patch within 12 hours or as directed by MD  Qty: 60 patch, Refills: 2    Associated Diagnoses: DDD (degenerative disc disease), thoracolumbar      multivitamin capsule Take 1 capsule by mouth once daily.      oxyCODONE-acetaminophen (PERCOCET) 7.5-325 mg per tablet Take 1 tablet by mouth every 8 (eight) hours as needed for Pain.  Qty: 21 tablet, Refills: 0    Comments: Quantity prescribed more than 7 day supply? Yes, quantity medically necessary  Associated Diagnoses: Thoracic radiculopathy      pantoprazole (PROTONIX) 40 MG tablet TAKE 1 TABLET EVERY DAY  Qty: 90 tablet, Refills: 1    Associated Diagnoses: Gastroesophageal reflux disease, unspecified whether esophagitis present      RETACRIT 20,000 unit/mL injection       temazepam (RESTORIL) 30 mg capsule TAKE 1 CAPSULE AT BEDTIME  Qty: 30 capsule, Refills: 5    Associated Diagnoses: Insomnia, unspecified type      tiZANidine (ZANAFLEX) 4 MG tablet TAKE 1 TABLET TWICE DAILY AS NEEDED  Qty: 180 tablet, Refills: 0    Associated Diagnoses: Lumbar spondylosis      vitamin E 400 UNIT capsule Take 400 Units by mouth once daily.      zolpidem (AMBIEN) 10 mg Tab TAKE 1 TABLET EVERY EVENING  Qty: 90 tablet, Refills: 0    Associated Diagnoses: Primary insomnia                 Discharge Diagnosis: Thoracic radiculopathy [M54.14]  Condition on Discharge: Stable with no complications to procedure   Diet on Discharge: Same as before.  Activity: as per instruction sheet.  Discharge to: Home with a responsible adult.  Follow up: 2-4 weeks       Please call the office at [phone] if you experience any weakness or loss of sensation, fever > 101.5, pain uncontrolled with oral medications, persistent  nausea/vomiting/or diarrhea, redness or drainage from the incisions, or any other worrisome concerns. If physician on call was not reached or could not communicate with our office for any reason please go to the nearest emergency department

## 2023-02-03 ENCOUNTER — TELEPHONE (OUTPATIENT)
Dept: PAIN MEDICINE | Facility: CLINIC | Age: 68
End: 2023-02-03
Payer: MEDICARE

## 2023-02-03 DIAGNOSIS — M79.7 FIBROMYALGIA: ICD-10-CM

## 2023-02-03 RX ORDER — PREGABALIN 50 MG/1
50 CAPSULE ORAL 2 TIMES DAILY
Qty: 180 CAPSULE | Refills: 1 | Status: SHIPPED | OUTPATIENT
Start: 2023-02-03 | End: 2023-07-20

## 2023-02-03 NOTE — TELEPHONE ENCOUNTER
----- Message from Pia Shin sent at 2/3/2023 12:44 PM CST -----  Pt stated she had the procedure done on the right side but the pain started in her left side from her groin to her knee (burning, stabbing) she stated she took the pain medication and put the lidocaine on it and its still hurting. She is requesting a call back at [phone] thx jm

## 2023-02-03 NOTE — TELEPHONE ENCOUNTER
Per Dr. Pierre states that the steroids he used may take up to two weeks until full effects are noted  Injection sore to touch but improved since last night.

## 2023-02-07 DIAGNOSIS — Z00.00 ENCOUNTER FOR MEDICARE ANNUAL WELLNESS EXAM: ICD-10-CM

## 2023-02-07 NOTE — PROGRESS NOTES
Subjective:       Patient ID: Nadia Damon is a 67 y.o. female.    Chief Complaint: Anemia    Primary Oncologist/Hematologist: Dr. Collins     HPI: Ms. Damon is a 67 year old female who is following up for her anemia due to CKD. Epo has been initiated, last dose 22-20kU.  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 She was treated with 1 cycle of systemic chemotherapy cytoxan and taxotere and udenyca that was discontinued due to toxicity. She had radiation, completed 22.   She then has abnormal mammogram of R breast. S/p bx, which is benign.   Pmhx: heart transplant 26 yrs ago, on anti rejection medication. chronic back pain and disc degeneration    Today: patient states she has been well. She has been dealing with some back pain, following with pain management for this. She states some of the pain may be due to constipation. Given regimen to help with this. She is taking fiber supplement and miralax sometimes. She denies any fatigue, sob, fevers, illnesses, n/v/d, bleeding. She also has abdominal hernia, being followed by general surgery, not interested in surgery currently. Not painful. She has poor appetite but states she stays hydrated.     Social History     Socioeconomic History    Marital status: Single    Number of children: 2    Highest education level: 11th grade   Occupational History    Occupation: Retired   Tobacco Use    Smoking status: Never    Smokeless tobacco: Never   Substance and Sexual Activity    Alcohol use: Never     Alcohol/week: 0.0 standard drinks    Drug use: No    Sexual activity: Not Currently     Partners: Male     Birth control/protection: See Surgical Hx   Other Topics Concern    Are you pregnant or think you may be? No    Breast-feeding No   Social History Narrative    Single. 2 children , 1  at 31 yoa  2014 strep throat -  pneumonia and renal complications after not completing course of AB.  Other child lives in [place], [place]. Has a cousin locally that could help in an emergency. 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 Caffeine intake =- 1 cola a day. No coffee, + occasional tea, avoids caffeine especially at night. Still drives. She does not have a Living Will or Advanced directive.        Past Medical History:   Diagnosis Date    Abdominal wall hernia     CT Renal 6/11/2018---Small fat containing superior ventral abdominal wall hernia at the epicardial pacing lead site.    Abnormal mammogram 10/12/2021    Anxiety     Arthritis     ZNE HIPS    Breast cancer in female 08/2021    LEFT BREAST    C. difficile colitis 11/29/2021    Cellulitis of axilla, left 12/23/2021    Chronic diastolic heart failure 12/16/2021    Chronic kidney disease     stage 4, GFR 15-29 ml/min    Chronic midline low back pain without sciatica 06/18/2018    Closed nondisplaced fracture of distal phalanx of left great toe with routine healing 10/22/2018    Coronary artery disease 1993    heart transplant    Cystitis 5/10/2022    Depression     Fibromyalgia     on Lyrica    Heart failure     native heart cardiomyopathy    Heart transplanted 1993    due to cardiomyopathy    History of hyperparathyroidism; Hyperparathyroidism, secondary renal     PT DENIES    Hypertension     Immune disorder     anti rejection meds    Iron deficiency anemia 08/15/2017    Kidney stones     passed per pt    Obesity     Other osteoporosis without current pathological fracture 08/30/2019    Severe sepsis 11/22/2021    Shingles 2003 approx    left leg    Trouble in sleeping     Urinary incontinence        Family History   Problem Relation Age of Onset    Cancer Mother 38        breast    Breast cancer Mother     Breast cancer Maternal Grandmother     Heart disease Maternal Grandmother     Hypertension Son     Cataracts Cousin     Diabetes Neg Hx     Stroke Neg Hx     Kidney disease Neg Hx     Asthma Neg Hx     COPD Neg Hx      Melanoma Neg Hx     Hyperlipidemia Neg Hx        Past Surgical History:   Procedure Laterality Date    BLADDER SURGERY  2015 approx    mesh - Dr Everett then 2nd reconstructive sx Dr Onofre    BREAST BIOPSY Bilateral     NEGATIVE    BREAST BIOPSY Right 10/31/2022    benign    BREAST LUMPECTOMY Left 2021    BREAST SURGERY Left 09/28/2015    Bx - benign    BREAST SURGERY Right 12/2015    Bx benign    CARDIAC PACEMAKER REMOVAL Left 06/26/2014    Pacer defirillator removed. Put in 1993 aat time of heart transplant    CARPAL TUNNEL RELEASE Left 03/03/2015    Dr. Hall    COLONOSCOPY N/A 02/25/2021    Procedure: COLONOSCOPY;  Surgeon: Freida Ramirez MD;  Location: [facility] ENDO;  Service: Endoscopy;  Laterality: N/A;    EPIDURAL STEROID INJECTION INTO CERVICAL SPINE N/A 2/2/2023    Procedure: T11/T12 IL HELLEN;  Surgeon: Jassi Pierre MD;  Location: [facility] PAIN MGT;  Service: Pain Management;  Laterality: N/A;    HEART TRANSPLANT  1993    HERNIA REPAIR Right 1971 approx    Inguinal    HYSTERECTOMY  1983    vag hyst /LSO     INCISION AND DRAINAGE OF ABSCESS Left 12/24/2021    Procedure: INCISION AND DRAINAGE, ABSCESS;  Surgeon: Joseph Longo MD;  Location: [facility] OR;  Service: General;  Laterality: Left;    INJECTION OF ANESTHETIC AGENT AROUND MEDIAL BRANCH NERVES INNERVATING LUMBAR FACET JOINT Right 10/19/2022    Procedure: Right L4/L5 and L5/S1 MBB;  Surgeon: Jassi Pierre MD;  Location: [facility] PAIN MGT;  Service: Pain Management;  Laterality: Right;    INJECTION OF ANESTHETIC AGENT AROUND MEDIAL BRANCH NERVES INNERVATING LUMBAR FACET JOINT Right 11/09/2022    Procedure: Right L4/L5 and L5/S1 MBB;  Surgeon: Jassi Pierre MD;  Location: [facility] PAIN MGT;  Service: Pain Management;  Laterality: Right;    INJECTION OF ANESTHETIC AGENT INTO SACROILIAC JOINT Right 08/22/2022    Procedure: Right SIJ Injection Right L5/S1 Facte Injection;  Surgeon: Jassi Pierre MD;  Location: [facility] PAIN MGT;  Service: Pain Management;   Laterality: Right;    INSERTION OF TUNNELED CENTRAL VENOUS CATHETER (CVC) WITH SUBCUTANEOUS PORT N/A 11/09/2021    Procedure: [id number];  Surgeon: Christoph Douglas MD;  Location: [facility] OR;  Service: General;  Laterality: N/A;    RADIOFREQUENCY THERMOCOAGULATION Right 12/7/2022    Procedure: Right L4/L5 and L5/S1 Lumbar RFA;  Surgeon: Jassi Pierre MD;  Location: [facility] PAIN MGT;  Service: Pain Management;  Laterality: Right;    REMOVAL OF VASCULAR ACCESS PORT      SENTINEL LYMPH NODE BIOPSY Left 10/12/2021    Procedure: BIOPSY, LYMPH NODE, SENTINEL;  Surgeon: Christoph Douglas MD;  Location: [facility] OR;  Service: General;  Laterality: Left;    TOE SURGERY         Review of Systems   Constitutional:  Negative for activity change, appetite change, chills, diaphoresis, fatigue, fever and unexpected weight change.   HENT:  Negative for congestion and nosebleeds.    Respiratory:  Negative for cough and shortness of breath.    Cardiovascular:  Negative for chest pain and leg swelling.   Gastrointestinal:  Positive for constipation. Negative for abdominal pain, blood in stool, diarrhea, nausea and vomiting.   Genitourinary:  Negative for hematuria.   Musculoskeletal:  Positive for arthralgias, back pain and myalgias.   Skin:  Negative for color change and pallor.   Neurological:  Positive for numbness. Negative for dizziness, weakness, light-headedness and headaches.   Hematological:  Does not bruise/bleed easily.       Medication List with Changes/Refills   Current Medications    AMLODIPINE (NORVASC) 2.5 MG TABLET    Take 1 tablet (2.5 mg total) by mouth once daily.    ASPIRIN (ECOTRIN) 81 MG EC TABLET    Take 1 tablet (81 mg total) by mouth once daily.    BIOTIN 10,000 MCG CAP    Take 1 tablet by mouth once daily.    BUSPIRONE (BUSPAR) 10 MG TABLET    Take 1 tablet (10 mg total) by mouth once daily.    CALCITONIN, SALMON, (FORTICAL) 200 UNIT/ACTUATION NASAL SPRAY    1 spray by Nasal route once daily.    CARVEDILOL  (COREG) 6.25 MG TABLET    Take 1 tablet (6.25 mg total) by mouth 2 (two) times daily with meals.    CYCLOSPORINE MODIFIED, NEORAL, (NEORAL) 25 MG CAPSULE    TAKE 3 CAPSULES (75 MG TOTAL) BY MOUTH 2 (TWO) TIMES DAILY.    DULOXETINE (CYMBALTA) 30 MG CAPSULE    TAKE 1 CAPSULE EVERY DAY    EVENING PRIMROSE OIL ORAL    Take 1,000 mg by mouth once daily.    FUROSEMIDE (LASIX) 20 MG TABLET    Take 1 tablet (20 mg total) by mouth once daily.    HYDRALAZINE (APRESOLINE) 50 MG TABLET    Take 1 tablet (50 mg total) by mouth every 8 (eight) hours. TAKE 1 TABLETS  EVERY 8  HOURS.    LIDOCAINE (LIDODERM) 5 %    Place 2 patches onto the skin once daily. Remove & Discard patch within 12 hours or as directed by MD    MULTIVITAMIN CAPSULE    Take 1 capsule by mouth once daily.    OXYCODONE-ACETAMINOPHEN (PERCOCET) 7.5-325 MG PER TABLET    Take 1 tablet by mouth every 8 (eight) hours as needed for Pain.    PANTOPRAZOLE (PROTONIX) 40 MG TABLET    TAKE 1 TABLET EVERY DAY    PREGABALIN (LYRICA) 50 MG CAPSULE    Take 1 capsule (50 mg total) by mouth 2 (two) times daily. NOON & NIGHT TIME    RETACRIT 20,000 UNIT/ML INJECTION        TEMAZEPAM (RESTORIL) 30 MG CAPSULE    TAKE 1 CAPSULE AT BEDTIME    TIZANIDINE (ZANAFLEX) 4 MG TABLET    TAKE 1 TABLET TWICE DAILY AS NEEDED    VITAMIN E 400 UNIT CAPSULE    Take 400 Units by mouth once daily.    ZOLPIDEM (AMBIEN) 10 MG TAB    TAKE 1 TABLET EVERY EVENING     Objective:     Vitals:    02/08/23 1311   BP: (!) 158/96   Pulse: 95   Resp: 18   Temp: 97.8 °F (36.6 °C)     Physical Exam  Constitutional:       General: She is not in acute distress.     Appearance: She is not ill-appearing, toxic-appearing or diaphoretic.   HENT:      Head: Normocephalic and atraumatic.   Eyes:      Conjunctiva/sclera: Conjunctivae normal.   Cardiovascular:      Rate and Rhythm: Normal rate.      Pulses: Normal pulses.   Pulmonary:      Effort: Pulmonary effort is normal.   Abdominal:      Comments: Epigastric abdominal  hernia    Musculoskeletal:      Right lower leg: No edema.   Skin:     General: Skin is warm and dry.      Coloration: Skin is not jaundiced or pale.      Findings: No bruising, erythema, lesion or rash.   Neurological:      Mental Status: She is alert.      Gait: Gait normal.          Labs/Results:  Lab Results   Component Value Date    WBC 5.67 02/08/2023    RBC 3.23 (L) 02/08/2023    HGB 9.3 (L) 02/08/2023    HCT 29.1 (L) 02/08/2023    MCV 90 02/08/2023    MCH 28.8 02/08/2023    MCHC 32.0 02/08/2023    RDW 14.6 (H) 02/08/2023     02/08/2023    MPV 9.1 (L) 02/08/2023    GRAN 4.0 02/08/2023    GRAN 70.9 02/08/2023    LYMPH 0.8 (L) 02/08/2023    LYMPH 14.3 (L) 02/08/2023    MONO 0.6 02/08/2023    MONO 11.1 02/08/2023    EOS 0.1 02/08/2023    BASO 0.01 02/08/2023    EOSINOPHIL 1.9 02/08/2023    BASOPHIL 0.2 02/08/2023     CMP  Sodium   Date Value Ref Range Status   02/08/2023 138 136 - 145 mmol/L Final     Potassium   Date Value Ref Range Status   02/08/2023 5.0 3.5 - 5.1 mmol/L Final     Chloride   Date Value Ref Range Status   02/08/2023 105 95 - 110 mmol/L Final     CO2   Date Value Ref Range Status   02/08/2023 21 (L) 23 - 29 mmol/L Final     Glucose   Date Value Ref Range Status   02/08/2023 176 (H) 70 - 110 mg/dL Final     BUN   Date Value Ref Range Status   02/08/2023 30 (H) 8 - 23 mg/dL Final     Creatinine   Date Value Ref Range Status   02/08/2023 2.2 (H) 0.5 - 1.4 mg/dL Final     Calcium   Date Value Ref Range Status   02/08/2023 8.5 (L) 8.7 - 10.5 mg/dL Final     Total Protein   Date Value Ref Range Status   01/04/2023 7.7 6.0 - 8.4 g/dL Final     Albumin   Date Value Ref Range Status   01/04/2023 3.3 (L) 3.5 - 5.2 g/dL Final   01/04/2023 3.3 (L) 3.5 - 5.2 g/dL Final     Total Bilirubin   Date Value Ref Range Status   01/04/2023 0.5 0.1 - 1.0 mg/dL Final     Comment:     For infants and newborns, interpretation of results should be based  on gestational age, weight and in agreement with  clinical  observations.    Premature Infant recommended reference ranges:  Up to 24 hours.............<8.0 mg/dL  Up to 48 hours............<12.0 mg/dL  3-5 days..................<15.0 mg/dL  6-29 days.................<15.0 mg/dL       Alkaline Phosphatase   Date Value Ref Range Status   01/04/2023 137 (H) 55 - 135 U/L Final     AST   Date Value Ref Range Status   01/04/2023 36 10 - 40 U/L Final     ALT   Date Value Ref Range Status   01/04/2023 21 10 - 44 U/L Final     Anion Gap   Date Value Ref Range Status   02/08/2023 12 8 - 16 mmol/L Final     eGFR   Date Value Ref Range Status   02/08/2023 24 (A) >60 mL/min/1.73 m^2 Final       Assessment:     Problem List Items Addressed This Visit          Hematology    Thrombocytopenia, unspecified       Oncology    Iron deficiency anemia - Primary    Ductal carcinoma in situ (DCIS) of left breast    Anemia associated with stage 4 chronic renal failure    Secondary and unspecified malignant neoplasm of axilla and upper limb lymph nodes    Leukopenia     Plan:     Ductal carcinoma in situ (DCIS) of left breast  --continue follow up with radiation oncology and survivorship  --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 She was treated with 1 cycle of systemic chemotherapy cytoxan and taxotere and udenyca that was discontinued due to toxicity. She had radiation, completed 4/14/22  --April 2023 with r diag mammo and U/S  --R abnormal mammogram and Bx-benign.       Iron deficiency anemia, unspecified iron deficiency anemia type  --iron: 64, sat: 23%, ferritin: 163- WNL  --continue to monitor    Anemia associated with stage 4 chronic renal failure  --hgb:9.3 , hmt:29.1  --hgb ranges from 9-11g/dL.   --patient would like to hold off on any further epo injections at this time, which is reasonable given her hgb is stable.  --epo 20kU for hgb<10g/dL  --last epo 9/2022  --kidney function consistent       Immunocompromised patient  --on  immunosuppressive medications  --continue to monitor  --following up with transplant      Constipation  --miralax daily PRN  --metamucil fiber supplement daily PRN  --stimulant laxative PRN  --follow up with GI.   --continue to stay hydrated    Follow-Up: 5 months with cbc cmp iron/tibc ferritin prior     Kelsie Burk PA-C  Hematology Oncology    Route Chart for Scheduling    Med Onc Chart Routing      Follow up with physician    Follow up with LIA . 5 months with cbc cmp iron/tibc ferritin prior   Infusion scheduling note    Injection scheduling note    Labs Ferritin, CMP, iron and TIBC and CBC   Lab interval:     Imaging    Pharmacy appointment No pharmacy appointment needed      Other referrals No additional referrals needed           Therapy Plan Information  epoetin tristan-epbx injection 20,000 Units  20,000 Units, Subcutaneous, PRN

## 2023-02-08 ENCOUNTER — OFFICE VISIT (OUTPATIENT)
Dept: HEMATOLOGY/ONCOLOGY | Facility: CLINIC | Age: 68
End: 2023-02-08
Payer: MEDICARE

## 2023-02-08 ENCOUNTER — LAB VISIT (OUTPATIENT)
Dept: LAB | Facility: HOSPITAL | Age: 68
End: 2023-02-08
Attending: NURSE PRACTITIONER
Payer: MEDICARE

## 2023-02-08 VITALS
HEART RATE: 95 BPM | WEIGHT: 168.19 LBS | BODY MASS INDEX: 30.95 KG/M2 | HEIGHT: 62 IN | TEMPERATURE: 98 F | OXYGEN SATURATION: 98 % | RESPIRATION RATE: 18 BRPM | DIASTOLIC BLOOD PRESSURE: 96 MMHG | SYSTOLIC BLOOD PRESSURE: 158 MMHG

## 2023-02-08 DIAGNOSIS — C77.3 SECONDARY AND UNSPECIFIED MALIGNANT NEOPLASM OF AXILLA AND UPPER LIMB LYMPH NODES: ICD-10-CM

## 2023-02-08 DIAGNOSIS — D05.12 DUCTAL CARCINOMA IN SITU (DCIS) OF LEFT BREAST: ICD-10-CM

## 2023-02-08 DIAGNOSIS — D63.1 ANEMIA ASSOCIATED WITH STAGE 4 CHRONIC RENAL FAILURE: ICD-10-CM

## 2023-02-08 DIAGNOSIS — N18.4 ANEMIA ASSOCIATED WITH STAGE 4 CHRONIC RENAL FAILURE: ICD-10-CM

## 2023-02-08 DIAGNOSIS — D50.9 IRON DEFICIENCY ANEMIA, UNSPECIFIED IRON DEFICIENCY ANEMIA TYPE: Primary | ICD-10-CM

## 2023-02-08 DIAGNOSIS — D69.6 THROMBOCYTOPENIA, UNSPECIFIED: ICD-10-CM

## 2023-02-08 DIAGNOSIS — D72.819 LEUKOPENIA, UNSPECIFIED TYPE: ICD-10-CM

## 2023-02-08 LAB
ANION GAP SERPL CALC-SCNC: 12 MMOL/L (ref 8–16)
BASOPHILS # BLD AUTO: 0.01 K/UL (ref 0–0.2)
BASOPHILS NFR BLD: 0.2 % (ref 0–1.9)
BUN SERPL-MCNC: 30 MG/DL (ref 8–23)
CALCIUM SERPL-MCNC: 8.5 MG/DL (ref 8.7–10.5)
CHLORIDE SERPL-SCNC: 105 MMOL/L (ref 95–110)
CO2 SERPL-SCNC: 21 MMOL/L (ref 23–29)
CREAT SERPL-MCNC: 2.2 MG/DL (ref 0.5–1.4)
DIFFERENTIAL METHOD: ABNORMAL
EOSINOPHIL # BLD AUTO: 0.1 K/UL (ref 0–0.5)
EOSINOPHIL NFR BLD: 1.9 % (ref 0–8)
ERYTHROCYTE [DISTWIDTH] IN BLOOD BY AUTOMATED COUNT: 14.6 % (ref 11.5–14.5)
EST. GFR  (NO RACE VARIABLE): 24 ML/MIN/1.73 M^2
GLUCOSE SERPL-MCNC: 176 MG/DL (ref 70–110)
HCT VFR BLD AUTO: 29.1 % (ref 37–48.5)
HGB BLD-MCNC: 9.3 G/DL (ref 12–16)
IMM GRANULOCYTES # BLD AUTO: 0.09 K/UL (ref 0–0.04)
IMM GRANULOCYTES NFR BLD AUTO: 1.6 % (ref 0–0.5)
LYMPHOCYTES # BLD AUTO: 0.8 K/UL (ref 1–4.8)
LYMPHOCYTES NFR BLD: 14.3 % (ref 18–48)
MCH RBC QN AUTO: 28.8 PG (ref 27–31)
MCHC RBC AUTO-ENTMCNC: 32 G/DL (ref 32–36)
MCV RBC AUTO: 90 FL (ref 82–98)
MONOCYTES # BLD AUTO: 0.6 K/UL (ref 0.3–1)
MONOCYTES NFR BLD: 11.1 % (ref 4–15)
NEUTROPHILS # BLD AUTO: 4 K/UL (ref 1.8–7.7)
NEUTROPHILS NFR BLD: 70.9 % (ref 38–73)
NRBC BLD-RTO: 0 /100 WBC
PLATELET # BLD AUTO: 204 K/UL (ref 150–450)
PMV BLD AUTO: 9.1 FL (ref 9.2–12.9)
POTASSIUM SERPL-SCNC: 5 MMOL/L (ref 3.5–5.1)
RBC # BLD AUTO: 3.23 M/UL (ref 4–5.4)
SODIUM SERPL-SCNC: 138 MMOL/L (ref 136–145)
WBC # BLD AUTO: 5.67 K/UL (ref 3.9–12.7)

## 2023-02-08 PROCEDURE — 1159F MED LIST DOCD IN RCRD: CPT | Mod: HCNC,CPTII,S$GLB,

## 2023-02-08 PROCEDURE — 1126F PR PAIN SEVERITY QUANTIFIED, NO PAIN PRESENT: ICD-10-PCS | Mod: HCNC,CPTII,S$GLB,

## 2023-02-08 PROCEDURE — 3288F FALL RISK ASSESSMENT DOCD: CPT | Mod: HCNC,CPTII,S$GLB,

## 2023-02-08 PROCEDURE — 99214 OFFICE O/P EST MOD 30 MIN: CPT | Mod: HCNC,S$GLB,,

## 2023-02-08 PROCEDURE — 3008F BODY MASS INDEX DOCD: CPT | Mod: HCNC,CPTII,S$GLB,

## 2023-02-08 PROCEDURE — 1159F PR MEDICATION LIST DOCUMENTED IN MEDICAL RECORD: ICD-10-PCS | Mod: HCNC,CPTII,S$GLB,

## 2023-02-08 PROCEDURE — 85025 COMPLETE CBC W/AUTO DIFF WBC: CPT | Mod: HCNC | Performed by: NURSE PRACTITIONER

## 2023-02-08 PROCEDURE — 36415 COLL VENOUS BLD VENIPUNCTURE: CPT | Mod: HCNC | Performed by: NURSE PRACTITIONER

## 2023-02-08 PROCEDURE — 1126F AMNT PAIN NOTED NONE PRSNT: CPT | Mod: HCNC,CPTII,S$GLB,

## 2023-02-08 PROCEDURE — 80048 BASIC METABOLIC PNL TOTAL CA: CPT | Mod: HCNC | Performed by: NURSE PRACTITIONER

## 2023-02-08 PROCEDURE — 3008F PR BODY MASS INDEX (BMI) DOCUMENTED: ICD-10-PCS | Mod: HCNC,CPTII,S$GLB,

## 2023-02-08 PROCEDURE — 1157F PR ADVANCE CARE PLAN OR EQUIV PRESENT IN MEDICAL RECORD: ICD-10-PCS | Mod: HCNC,CPTII,S$GLB,

## 2023-02-08 PROCEDURE — 1101F PR PT FALLS ASSESS DOC 0-1 FALLS W/OUT INJ PAST YR: ICD-10-PCS | Mod: HCNC,CPTII,S$GLB,

## 2023-02-08 PROCEDURE — 99999 PR PBB SHADOW E&M-EST. PATIENT-LVL V: CPT | Mod: PBBFAC,HCNC,,

## 2023-02-08 PROCEDURE — 3288F PR FALLS RISK ASSESSMENT DOCUMENTED: ICD-10-PCS | Mod: HCNC,CPTII,S$GLB,

## 2023-02-08 PROCEDURE — 99214 PR OFFICE/OUTPT VISIT, EST, LEVL IV, 30-39 MIN: ICD-10-PCS | Mod: HCNC,S$GLB,,

## 2023-02-08 PROCEDURE — 1157F ADVNC CARE PLAN IN RCRD: CPT | Mod: HCNC,CPTII,S$GLB,

## 2023-02-08 PROCEDURE — 99999 PR PBB SHADOW E&M-EST. PATIENT-LVL V: ICD-10-PCS | Mod: PBBFAC,HCNC,,

## 2023-02-08 PROCEDURE — 1101F PT FALLS ASSESS-DOCD LE1/YR: CPT | Mod: HCNC,CPTII,S$GLB,

## 2023-02-08 PROCEDURE — 3077F SYST BP >= 140 MM HG: CPT | Mod: HCNC,CPTII,S$GLB,

## 2023-02-08 PROCEDURE — 3080F DIAST BP >= 90 MM HG: CPT | Mod: HCNC,CPTII,S$GLB,

## 2023-02-08 PROCEDURE — 3077F PR MOST RECENT SYSTOLIC BLOOD PRESSURE >= 140 MM HG: ICD-10-PCS | Mod: HCNC,CPTII,S$GLB,

## 2023-02-08 PROCEDURE — 3080F PR MOST RECENT DIASTOLIC BLOOD PRESSURE >= 90 MM HG: ICD-10-PCS | Mod: HCNC,CPTII,S$GLB,

## 2023-02-08 NOTE — PATIENT INSTRUCTIONS
"Metamucil Gummies or Metamucil Fiber Packets- Fiber supplement. Can take this daily.   Stool Softener (Miralax) or "polyethylene Glycol"- Can take this daily. This is a powder that you put in liquids. Drink plenty of water  Stimulant Laxative (Ducolax laxative) - Take as needed for constipation. Use to "clean out", not daily.     Add protein into diet via protein shakes or protein powder.   "

## 2023-02-09 DIAGNOSIS — Z00.00 ENCOUNTER FOR MEDICARE ANNUAL WELLNESS EXAM: ICD-10-CM

## 2023-02-10 ENCOUNTER — OFFICE VISIT (OUTPATIENT)
Dept: OPHTHALMOLOGY | Facility: CLINIC | Age: 68
End: 2023-02-10
Payer: MEDICARE

## 2023-02-10 ENCOUNTER — PATIENT MESSAGE (OUTPATIENT)
Dept: OPHTHALMOLOGY | Facility: CLINIC | Age: 68
End: 2023-02-10

## 2023-02-10 DIAGNOSIS — H25.013 CORTICAL AGE-RELATED CATARACT, BILATERAL: Primary | ICD-10-CM

## 2023-02-10 DIAGNOSIS — I10 ESSENTIAL HYPERTENSION: ICD-10-CM

## 2023-02-10 DIAGNOSIS — H52.7 REFRACTIVE ERRORS: ICD-10-CM

## 2023-02-10 PROCEDURE — 1157F ADVNC CARE PLAN IN RCRD: CPT | Mod: HCNC,CPTII,S$GLB, | Performed by: OPTOMETRIST

## 2023-02-10 PROCEDURE — 92015 PR REFRACTION: ICD-10-PCS | Mod: HCNC,S$GLB,, | Performed by: OPTOMETRIST

## 2023-02-10 PROCEDURE — 99999 PR PBB SHADOW E&M-EST. PATIENT-LVL III: CPT | Mod: PBBFAC,HCNC,, | Performed by: OPTOMETRIST

## 2023-02-10 PROCEDURE — 1157F PR ADVANCE CARE PLAN OR EQUIV PRESENT IN MEDICAL RECORD: ICD-10-PCS | Mod: HCNC,CPTII,S$GLB, | Performed by: OPTOMETRIST

## 2023-02-10 PROCEDURE — 92014 COMPRE OPH EXAM EST PT 1/>: CPT | Mod: HCNC,S$GLB,, | Performed by: OPTOMETRIST

## 2023-02-10 PROCEDURE — 92015 DETERMINE REFRACTIVE STATE: CPT | Mod: HCNC,S$GLB,, | Performed by: OPTOMETRIST

## 2023-02-10 PROCEDURE — 1159F MED LIST DOCD IN RCRD: CPT | Mod: HCNC,CPTII,S$GLB, | Performed by: OPTOMETRIST

## 2023-02-10 PROCEDURE — 92014 PR EYE EXAM, EST PATIENT,COMPREHESV: ICD-10-PCS | Mod: HCNC,S$GLB,, | Performed by: OPTOMETRIST

## 2023-02-10 PROCEDURE — 1159F PR MEDICATION LIST DOCUMENTED IN MEDICAL RECORD: ICD-10-PCS | Mod: HCNC,CPTII,S$GLB, | Performed by: OPTOMETRIST

## 2023-02-10 PROCEDURE — 99999 PR PBB SHADOW E&M-EST. PATIENT-LVL III: ICD-10-PCS | Mod: PBBFAC,HCNC,, | Performed by: OPTOMETRIST

## 2023-02-10 NOTE — PROGRESS NOTES
HPI     Blurred Vision     Additional comments: Pt here for annual exam. Pt says she has noticed a   decline in her vision. She says driving at night is challenging due to the   glare. She says her distance vision has declined. No pain or discomfort.           Last edited by Jatinder Abdi MA on 2/10/2023  1:36 PM.            Assessment /Plan     For exam results, see Encounter Report.    Cortical age-related cataract, bilateral    Essential hypertension    Refractive errors      Moderate cataracts OU, not surgical  Worsening    No HTN Retinopathy    Dispense Final Rx for glasses.  RTC 1 year  Discussed above and answered questions.

## 2023-02-13 ENCOUNTER — OFFICE VISIT (OUTPATIENT)
Dept: PRIMARY CARE CLINIC | Facility: CLINIC | Age: 68
End: 2023-02-13
Payer: MEDICARE

## 2023-02-13 VITALS
HEIGHT: 62 IN | SYSTOLIC BLOOD PRESSURE: 129 MMHG | BODY MASS INDEX: 31.38 KG/M2 | WEIGHT: 170.5 LBS | OXYGEN SATURATION: 99 % | HEART RATE: 91 BPM | DIASTOLIC BLOOD PRESSURE: 77 MMHG

## 2023-02-13 DIAGNOSIS — F33.9 CHRONIC MAJOR DEPRESSIVE DISORDER, RECURRENT EPISODE: ICD-10-CM

## 2023-02-13 DIAGNOSIS — E03.9 ACQUIRED HYPOTHYROIDISM: ICD-10-CM

## 2023-02-13 DIAGNOSIS — E21.3 HYPERPARATHYROIDISM: ICD-10-CM

## 2023-02-13 DIAGNOSIS — N18.4 CKD (CHRONIC KIDNEY DISEASE) STAGE 4, GFR 15-29 ML/MIN: ICD-10-CM

## 2023-02-13 DIAGNOSIS — F41.9 ANXIETY: ICD-10-CM

## 2023-02-13 DIAGNOSIS — K12.1 STOMATITIS: ICD-10-CM

## 2023-02-13 DIAGNOSIS — R73.9 HYPERGLYCEMIA: Primary | ICD-10-CM

## 2023-02-13 DIAGNOSIS — E03.9 HYPOTHYROIDISM, UNSPECIFIED TYPE: ICD-10-CM

## 2023-02-13 PROCEDURE — 99214 PR OFFICE/OUTPT VISIT, EST, LEVL IV, 30-39 MIN: ICD-10-PCS | Mod: HCNC,S$GLB,, | Performed by: NURSE PRACTITIONER

## 2023-02-13 PROCEDURE — 3074F PR MOST RECENT SYSTOLIC BLOOD PRESSURE < 130 MM HG: ICD-10-PCS | Mod: HCNC,CPTII,S$GLB, | Performed by: NURSE PRACTITIONER

## 2023-02-13 PROCEDURE — 1157F PR ADVANCE CARE PLAN OR EQUIV PRESENT IN MEDICAL RECORD: ICD-10-PCS | Mod: HCNC,CPTII,S$GLB, | Performed by: NURSE PRACTITIONER

## 2023-02-13 PROCEDURE — 99999 PR PBB SHADOW E&M-EST. PATIENT-LVL V: CPT | Mod: PBBFAC,HCNC,, | Performed by: NURSE PRACTITIONER

## 2023-02-13 PROCEDURE — 99999 PR PBB SHADOW E&M-EST. PATIENT-LVL V: ICD-10-PCS | Mod: PBBFAC,HCNC,, | Performed by: NURSE PRACTITIONER

## 2023-02-13 PROCEDURE — 1159F PR MEDICATION LIST DOCUMENTED IN MEDICAL RECORD: ICD-10-PCS | Mod: HCNC,CPTII,S$GLB, | Performed by: NURSE PRACTITIONER

## 2023-02-13 PROCEDURE — 1159F MED LIST DOCD IN RCRD: CPT | Mod: HCNC,CPTII,S$GLB, | Performed by: NURSE PRACTITIONER

## 2023-02-13 PROCEDURE — 3008F BODY MASS INDEX DOCD: CPT | Mod: HCNC,CPTII,S$GLB, | Performed by: NURSE PRACTITIONER

## 2023-02-13 PROCEDURE — 3078F DIAST BP <80 MM HG: CPT | Mod: HCNC,CPTII,S$GLB, | Performed by: NURSE PRACTITIONER

## 2023-02-13 PROCEDURE — 1126F PR PAIN SEVERITY QUANTIFIED, NO PAIN PRESENT: ICD-10-PCS | Mod: HCNC,CPTII,S$GLB, | Performed by: NURSE PRACTITIONER

## 2023-02-13 PROCEDURE — 1157F ADVNC CARE PLAN IN RCRD: CPT | Mod: HCNC,CPTII,S$GLB, | Performed by: NURSE PRACTITIONER

## 2023-02-13 PROCEDURE — 1101F PR PT FALLS ASSESS DOC 0-1 FALLS W/OUT INJ PAST YR: ICD-10-PCS | Mod: HCNC,CPTII,S$GLB, | Performed by: NURSE PRACTITIONER

## 2023-02-13 PROCEDURE — 3288F FALL RISK ASSESSMENT DOCD: CPT | Mod: HCNC,CPTII,S$GLB, | Performed by: NURSE PRACTITIONER

## 2023-02-13 PROCEDURE — 1126F AMNT PAIN NOTED NONE PRSNT: CPT | Mod: HCNC,CPTII,S$GLB, | Performed by: NURSE PRACTITIONER

## 2023-02-13 PROCEDURE — 3078F PR MOST RECENT DIASTOLIC BLOOD PRESSURE < 80 MM HG: ICD-10-PCS | Mod: HCNC,CPTII,S$GLB, | Performed by: NURSE PRACTITIONER

## 2023-02-13 PROCEDURE — 1101F PT FALLS ASSESS-DOCD LE1/YR: CPT | Mod: HCNC,CPTII,S$GLB, | Performed by: NURSE PRACTITIONER

## 2023-02-13 PROCEDURE — 3288F PR FALLS RISK ASSESSMENT DOCUMENTED: ICD-10-PCS | Mod: HCNC,CPTII,S$GLB, | Performed by: NURSE PRACTITIONER

## 2023-02-13 PROCEDURE — 3074F SYST BP LT 130 MM HG: CPT | Mod: HCNC,CPTII,S$GLB, | Performed by: NURSE PRACTITIONER

## 2023-02-13 PROCEDURE — 3008F PR BODY MASS INDEX (BMI) DOCUMENTED: ICD-10-PCS | Mod: HCNC,CPTII,S$GLB, | Performed by: NURSE PRACTITIONER

## 2023-02-13 PROCEDURE — 99214 OFFICE O/P EST MOD 30 MIN: CPT | Mod: HCNC,S$GLB,, | Performed by: NURSE PRACTITIONER

## 2023-02-13 RX ORDER — NYSTATIN 100000 [USP'U]/ML
6 SUSPENSION ORAL 4 TIMES DAILY
Qty: 240 ML | Refills: 5 | Status: SHIPPED | OUTPATIENT
Start: 2023-02-13 | End: 2023-02-23

## 2023-02-13 RX ORDER — NYSTATIN 100000 [USP'U]/ML
6 SUSPENSION ORAL 4 TIMES DAILY
Qty: 240 ML | Refills: 5 | Status: SHIPPED | OUTPATIENT
Start: 2023-02-13 | End: 2023-02-13

## 2023-02-13 RX ORDER — BUSPIRONE HYDROCHLORIDE 10 MG/1
10 TABLET ORAL DAILY
Qty: 90 TABLET | Refills: 1 | Status: SHIPPED | OUTPATIENT
Start: 2023-02-13 | End: 2023-02-15

## 2023-02-13 NOTE — PATIENT INSTRUCTIONS
Decrease duloxetine to every other day for two weeks then stop. If you feel changes in mood or level of pain call me sooner than scheduled follow up.

## 2023-02-13 NOTE — ASSESSMENT & PLAN NOTE
Refill buspirone.  Discussed risks/benefits of continuing duloxetine with CKD 4. She would like to try to wean.

## 2023-02-13 NOTE — PROGRESS NOTES
Nadia Damon  02/13/2023  [id number]    Elis Wick MD  Patient Care Team:  Elis Wick MD as PCP - General (Internal Medicine)  Miguel Soni Jr., MD as Consulting Physician (Vascular Surgery)  Ike King MD as Consulting Physician (Cardiology)  Courtney Tubbs MD as Consulting Physician (Cardiology)  Carter Crawford MD as Consulting Physician (Nephrology)  Paxton Vasques OD as Consulting Physician (Optometry)  PRANAY Villalobos MD as Obstetrician (Obstetrics)  Parker Mccarthy IV, MD (Urology)  Ike King MD as Consulting Physician (Cardiology)  Jose Roland MD as Consulting Physician (Dermatology)  Prasanth Johnson MD as Consulting Physician (Rheumatology)  Nora Morin LCSW as   Elis Wick MD as Physician (Internal Medicine)      [facility] Primary Care Note      Chief Complaint:  Chief Complaint   Patient presents with    3 mth f/u        History of Present Illness:  HPI    Had good BM - no more side pain.   HELLEN 2/2/23. Good relief of pain.   No new concerns today.  Sleeps well.    Elevated glucose 2/8/23 - 6 days after HELLEN.   No sx of hyperglycemia.       Review of Systems   Constitutional:  Negative for chills, fever and malaise/fatigue.   Respiratory:  Negative for cough and shortness of breath.    Cardiovascular:  Negative for chest pain and leg swelling.   Gastrointestinal:  Negative for abdominal pain, blood in stool, heartburn, melena, nausea and vomiting.   Genitourinary:  Negative for dysuria.   Musculoskeletal:  Negative for falls and myalgias.       The following were reviewed: Active problem list, medication list, allergies, family history, social history, and Health Maintenance.     History:  Past Medical History:   Diagnosis Date    Abdominal wall hernia     CT Renal 6/11/2018---Small fat containing superior ventral abdominal wall hernia at the epicardial pacing lead site.    Abnormal mammogram 10/12/2021    Anxiety     Arthritis     ZEN  HIPS    Breast cancer in female 08/2021    LEFT BREAST    C. difficile colitis 11/29/2021    Cellulitis of axilla, left 12/23/2021    Chronic diastolic heart failure 12/16/2021    Chronic kidney disease     stage 4, GFR 15-29 ml/min    Chronic midline low back pain without sciatica 06/18/2018    Closed nondisplaced fracture of distal phalanx of left great toe with routine healing 10/22/2018    Coronary artery disease 1993    heart transplant    Cystitis 5/10/2022    Depression     Fibromyalgia     on Lyrica    Heart failure     native heart cardiomyopathy    Heart transplanted 1993    due to cardiomyopathy    History of hyperparathyroidism; Hyperparathyroidism, secondary renal     PT DENIES    Hypertension     Immune disorder     anti rejection meds    Iron deficiency anemia 08/15/2017    Kidney stones     passed per pt    Obesity     Other osteoporosis without current pathological fracture 08/30/2019    Severe sepsis 11/22/2021    Shingles 2003 approx    left leg    Trouble in sleeping     Urinary incontinence      Past Surgical History:   Procedure Laterality Date    BLADDER SURGERY  2015 approx    mesh - Dr Everett then 2nd reconstructive sx Dr Onofre    BREAST BIOPSY Bilateral     NEGATIVE    BREAST BIOPSY Right 10/31/2022    benign    BREAST LUMPECTOMY Left 2021    BREAST SURGERY Left 09/28/2015    Bx - benign    BREAST SURGERY Right 12/2015    Bx benign    CARDIAC PACEMAKER REMOVAL Left 06/26/2014    Pacer defirillator removed. Put in 1993 aat time of heart transplant    CARPAL TUNNEL RELEASE Left 03/03/2015    Dr. Hall    COLONOSCOPY N/A 02/25/2021    Procedure: COLONOSCOPY;  Surgeon: Freida Ramirez MD;  Location: [facility] ENDO;  Service: Endoscopy;  Laterality: N/A;    EPIDURAL STEROID INJECTION INTO CERVICAL SPINE N/A 2/2/2023    Procedure: T11/T12 IL HELLEN;  Surgeon: Jassi Pierre MD;  Location: [facility] PAIN MGT;  Service: Pain Management;  Laterality: N/A;    HEART TRANSPLANT  1993    HERNIA REPAIR Right  1971 approx    Inguinal    HYSTERECTOMY  1983    vag hyst /LSO     INCISION AND DRAINAGE OF ABSCESS Left 12/24/2021    Procedure: INCISION AND DRAINAGE, ABSCESS;  Surgeon: Joseph Longo MD;  Location: [facility] OR;  Service: General;  Laterality: Left;    INJECTION OF ANESTHETIC AGENT AROUND MEDIAL BRANCH NERVES INNERVATING LUMBAR FACET JOINT Right 10/19/2022    Procedure: Right L4/L5 and L5/S1 MBB;  Surgeon: Jassi Pierre MD;  Location: [facility] PAIN MGT;  Service: Pain Management;  Laterality: Right;    INJECTION OF ANESTHETIC AGENT AROUND MEDIAL BRANCH NERVES INNERVATING LUMBAR FACET JOINT Right 11/09/2022    Procedure: Right L4/L5 and L5/S1 MBB;  Surgeon: Jassi Pierre MD;  Location: [facility] PAIN MGT;  Service: Pain Management;  Laterality: Right;    INJECTION OF ANESTHETIC AGENT INTO SACROILIAC JOINT Right 08/22/2022    Procedure: Right SIJ Injection Right L5/S1 Facte Injection;  Surgeon: Jassi Pierre MD;  Location: [facility] PAIN MGT;  Service: Pain Management;  Laterality: Right;    INSERTION OF TUNNELED CENTRAL VENOUS CATHETER (CVC) WITH SUBCUTANEOUS PORT N/A 11/09/2021    Procedure: [id number];  Surgeon: Christoph Douglas MD;  Location: [facility] OR;  Service: General;  Laterality: N/A;    RADIOFREQUENCY THERMOCOAGULATION Right 12/7/2022    Procedure: Right L4/L5 and L5/S1 Lumbar RFA;  Surgeon: Jassi Pierre MD;  Location: [facility] PAIN MGT;  Service: Pain Management;  Laterality: Right;    REMOVAL OF VASCULAR ACCESS PORT      SENTINEL LYMPH NODE BIOPSY Left 10/12/2021    Procedure: BIOPSY, LYMPH NODE, SENTINEL;  Surgeon: Christoph Douglas MD;  Location: [facility] OR;  Service: General;  Laterality: Left;    TOE SURGERY       Family History   Problem Relation Age of Onset    Cancer Mother 38        breast    Breast cancer Mother     Breast cancer Maternal Grandmother     Heart disease Maternal Grandmother     Hypertension Son     Cataracts Cousin     Diabetes Neg Hx     Stroke Neg Hx     Kidney disease Neg Hx      Asthma Neg Hx     COPD Neg Hx     Melanoma Neg Hx     Hyperlipidemia Neg Hx      Social History     Socioeconomic History    Marital status: Single    Number of children: 2    Highest education level: 11th grade   Occupational History    Occupation: Retired   Tobacco Use    Smoking status: Never    Smokeless tobacco: Never   Substance and Sexual Activity    Alcohol use: Never     Alcohol/week: 0.0 standard drinks    Drug use: No    Sexual activity: Not Currently     Partners: Male     Birth control/protection: See Surgical Hx   Other Topics Concern    Are you pregnant or think you may be? No    Breast-feeding No   Social History Narrative    Single. 2 children , 1  at 30 yoa   strep throat -  pneumonia and renal complications after not completing course of AB. Other child lives in [place], [place]. Has a cousin locally that could help in an emergency. 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 Caffeine intake =- 1 cola a day. No coffee, + occasional tea, avoids caffeine especially at night. Still drives. She does not have a Living Will or Advanced directive.      Patient Active Problem List   Diagnosis    Heart transplanted    Anxiety    Insomnia    CKD (chronic kidney disease) stage 4, GFR 15-29 ml/min    Immunosuppression due to drug therapy    Fibromyalgia    Gastroesophageal reflux disease without esophagitis    Breast screening    Immunization deficiency    Essential hypertension    Aortic atherosclerosis    Chronic major depressive disorder, recurrent episode    Iron deficiency anemia    Vaginal atrophy    Hyperparathyroidism    Hx of falling    Chronic midline low back pain without sciatica    Lightheadedness    Medication side effects    Obesity (BMI 30.0-34.9)    Edema    Asymptomatic menopausal state     Other osteoporosis without current pathological fracture    Cough    Abnormal CT scan, kidney    Weakness of both lower extremities    Immunocompromised patient     Osteoporosis, post-menopausal    Ductal carcinoma in situ (DCIS) of left breast    Immunodeficiency secondary to radiation therapy    The hangs, uncomplicated    GRACE (acute kidney injury)    Diarrhea    Thrombocytopenia, unspecified    Immunodeficiency due to chemotherapy    Strain of left shoulder    Left shoulder pain    Ulnar neuropathy of left upper extremity    Anemia associated with stage 4 chronic renal failure    Secondary and unspecified malignant neoplasm of axilla and upper limb lymph nodes    Other chest pain    Abnormal thyroid function test    Hypomagnesemia    Slow transit constipation    Other hyperlipidemia    Sacroiliac joint pain    Impaired mobility    Cyst of right breast    Leukopenia    Sacroiliitis    Right upper quadrant abdominal pain    DDD (degenerative disc disease), thoracolumbar    Ventral hernia without obstruction or gangrene    Acquired hypothyroidism     Review of patient's allergies indicates:   Allergen Reactions    Lisinopril Swelling and Rash    Augmentin [amoxicillin-pot clavulanate] Diarrhea       Medications:  Current Outpatient Medications on File Prior to Visit   Medication Sig Dispense Refill    amLODIPine (NORVASC) 2.5 MG tablet Take 1 tablet (2.5 mg total) by mouth once daily. 90 tablet 3    aspirin (ECOTRIN) 81 MG EC tablet Take 1 tablet (81 mg total) by mouth once daily. 90 tablet 3    biotin 10,000 mcg Cap Take 1 tablet by mouth once daily.      calcitonin, salmon, (FORTICAL) 200 unit/actuation nasal spray 1 spray by Nasal route once daily. 3 each 3    carvediloL (COREG) 6.25 MG tablet Take 1 tablet (6.25 mg total) by mouth 2 (two) times daily with meals. 180 tablet 3    cycloSPORINE modified, NEORAL, (NEORAL) 25 MG capsule TAKE 3 CAPSULES (75 MG TOTAL) BY MOUTH 2 (TWO) TIMES DAILY. 540 capsule 6    DULoxetine (CYMBALTA) 30 MG capsule TAKE 1 CAPSULE EVERY DAY 90 capsule 1    EVENING PRIMROSE OIL ORAL Take 1,000 mg by mouth once daily.      furosemide (LASIX) 20 MG  tablet Take 1 tablet (20 mg total) by mouth once daily. 90 tablet 1    hydrALAZINE (APRESOLINE) 50 MG tablet Take 1 tablet (50 mg total) by mouth every 8 (eight) hours. TAKE 1 TABLETS  EVERY 8  HOURS. 270 tablet 3    LIDOcaine (LIDODERM) 5 % Place 2 patches onto the skin once daily. Remove & Discard patch within 12 hours or as directed by MD 60 patch 2    multivitamin capsule Take 1 capsule by mouth once daily.      oxyCODONE-acetaminophen (PERCOCET) 7.5-325 mg per tablet Take 1 tablet by mouth every 8 (eight) hours as needed for Pain. 21 tablet 0    pantoprazole (PROTONIX) 40 MG tablet TAKE 1 TABLET EVERY DAY 90 tablet 1    pregabalin (LYRICA) 50 MG capsule Take 1 capsule (50 mg total) by mouth 2 (two) times daily. NOON & NIGHT TIME 180 capsule 1    psyllium husk (METAMUCIL ORAL) Take by mouth.      RETACRIT 20,000 unit/mL injection       temazepam (RESTORIL) 30 mg capsule TAKE 1 CAPSULE AT BEDTIME 30 capsule 5    tiZANidine (ZANAFLEX) 4 MG tablet TAKE 1 TABLET TWICE DAILY AS NEEDED 180 tablet 0    UNABLE TO FIND medication name: Stool softener (Ducolax) Laxative      vitamin E 400 UNIT capsule Take 400 Units by mouth once daily.      zolpidem (AMBIEN) 10 mg Tab TAKE 1 TABLET EVERY EVENING 90 tablet 0    [DISCONTINUED] busPIRone (BUSPAR) 10 MG tablet Take 1 tablet (10 mg total) by mouth once daily. 90 tablet 3     Current Facility-Administered Medications on File Prior to Visit   Medication Dose Route Frequency Provider Last Rate Last Admin    denosumab (PROLIA) injection 60 mg  60 mg Subcutaneous Q6 Months Prasanth Johnson MD        lactated ringers infusion   Intravenous Continuous Jennifer Carr MD 10 mL/hr at 11/09/21 0717 Restarted at 11/09/21 0820       Medications have been reviewed and reconciled with patient at visit today.    Barriers to medications present (no )    Fall since last office visit (no )      Exam:  Vitals:    02/13/23 1402   BP: 129/77   Pulse: 91     Weight: 77.3 kg (170 lb 8 oz)   Body mass  index is 31.18 kg/m².      BP Readings from Last 3 Encounters:   02/13/23 129/77   02/08/23 (!) 158/96   02/02/23 122/68     Wt Readings from Last 3 Encounters:   02/13/23 1402 77.3 kg (170 lb 8 oz)   02/08/23 1311 76.3 kg (168 lb 3.4 oz)   02/02/23 1015 74.5 kg (164 lb 2.1 oz)            Physical Exam  Constitutional:       General: She is not in acute distress.     Appearance: She is not ill-appearing.   HENT:      Mouth/Throat:      Mouth: Mucous membranes are moist.      Pharynx: No oropharyngeal exudate or posterior oropharyngeal erythema.      Comments: White patches to tongue  Eyes:      General: No scleral icterus.  Cardiovascular:      Rate and Rhythm: Normal rate and regular rhythm.   Pulmonary:      Effort: Pulmonary effort is normal.      Breath sounds: Normal breath sounds.   Abdominal:      General: There is no distension.      Palpations: Abdomen is soft.      Tenderness: There is no abdominal tenderness.   Musculoskeletal:         General: Swelling (1+) present.   Lymphadenopathy:      Cervical: No cervical adenopathy.   Skin:     General: Skin is warm and dry.   Neurological:      Mental Status: She is alert. Mental status is at baseline.      Gait: Gait normal.   Psychiatric:         Mood and Affect: Mood normal.         Behavior: Behavior normal.       Laboratory Reviewed: (Yes)  Lab Results   Component Value Date    WBC 5.67 02/08/2023    HGB 9.3 (L) 02/08/2023    HCT 29.1 (L) 02/08/2023     02/08/2023    CHOL 193 10/04/2022    TRIG 159 (H) 10/04/2022    HDL 46 10/04/2022    ALT 21 01/04/2023    AST 36 01/04/2023     02/08/2023    K 5.0 02/08/2023     02/08/2023    CREATININE 2.2 (H) 02/08/2023    BUN 30 (H) 02/08/2023    CO2 21 (L) 02/08/2023    TSH 5.231 (H) 06/14/2022    PSA <0.1 05/27/2008    INR 1.0 11/22/2021    HGBA1C 5.2 12/24/2021           Health Maintenance  Health Maintenance Topics with due status: Not Due       Topic Last Completion Date    Pneumococcal Vaccines  (Age 65+) 10/22/2018    TETANUS VACCINE 09/09/2020    Colorectal Cancer Screening 02/25/2021    Hemoglobin A1c (Diabetic Prevention Screening) 12/24/2021    Lipid Panel 10/04/2022    Mammogram 11/28/2022    DEXA Scan 01/25/2023     Health Maintenance Due   Topic Date Due    COVID-19 Vaccine (3 - Moderna risk series) 08/10/2022       Assessment:  Problem List Items Addressed This Visit          Psychiatric    Anxiety (Chronic)    Relevant Medications    busPIRone (BUSPAR) 10 MG tablet    Chronic major depressive disorder, recurrent episode     Refill buspirone.  Discussed risks/benefits of continuing duloxetine with CKD 4. She would like to try to wean.              Renal/    CKD (chronic kidney disease) stage 4, GFR 15-29 ml/min     CKD has been stable. Monitor.            Endocrine    Hyperparathyroidism     Mildly elevated previous PTH. Repeat. Monitor Ca, vit D, P.         Acquired hypothyroidism     Repeat TSH.          Other Visit Diagnoses       Hyperglycemia    -  Primary    Relevant Orders    RENAL FUNCTION PANEL    Hemoglobin A1C    Hypothyroidism, unspecified type        Relevant Orders    TSH    Stomatitis        Relevant Medications    nystatin (MYCOSTATIN) 100,000 unit/mL suspension              Plan:  Hyperglycemia  -     RENAL FUNCTION PANEL; Future; Expected date: 03/01/2023  -     Hemoglobin A1C; Future; Expected date: 03/01/2023    Hypothyroidism, unspecified type  -     TSH; Future; Expected date: 02/13/2023    Stomatitis  -     Discontinue: nystatin (MYCOSTATIN) 100,000 unit/mL suspension; Take 6 mLs (600,000 Units total) by mouth 4 (four) times daily. for 10 days  Dispense: 240 mL; Refill: 5  -     nystatin (MYCOSTATIN) 100,000 unit/mL suspension; Take 6 mLs (600,000 Units total) by mouth 4 (four) times daily. for 10 days  Dispense: 240 mL; Refill: 5    Anxiety  -     busPIRone (BUSPAR) 10 MG tablet; Take 1 tablet (10 mg total) by mouth once daily.  Dispense: 90 tablet; Refill: 1    Chronic  major depressive disorder, recurrent episode    CKD (chronic kidney disease) stage 4, GFR 15-29 ml/min    Acquired hypothyroidism    Hyperparathyroidism      -Patient's lab results were reviewed and discussed with patient  -Treatment options and alternatives were discussed with the patient. Patient expressed understanding. Patient was given the opportunity to ask questions and be an active participant in their medical care. Patient had no further questions or concerns at this time.   -Documentation of patient's health and condition was obtained from family member who was present during visit.  -Patient is an overall moderate risk for health complications from their medical conditions.       Follow up: Follow up in about 6 weeks (around 3/27/2023) for Medication Change.      After visit summary printed and given to patient upon discharge.  Patient goals and care plan are included in After visit summary.    Total medical decision making time was 36 min.  The following issues were discussed: The primary encounter diagnosis was Hyperglycemia. Diagnoses of Hypothyroidism, unspecified type, Stomatitis, Anxiety, Chronic major depressive disorder, recurrent episode, CKD (chronic kidney disease) stage 4, GFR 15-29 ml/min, Acquired hypothyroidism, and Hyperparathyroidism were also pertinent to this visit.    Health maintenance needs, recent test results and goals of care discussed with pt and questions answered.

## 2023-02-24 ENCOUNTER — TELEPHONE (OUTPATIENT)
Dept: PRIMARY CARE CLINIC | Facility: CLINIC | Age: 68
End: 2023-02-24
Payer: MEDICARE

## 2023-02-24 DIAGNOSIS — N81.10 FEMALE CYSTOCELE: Primary | ICD-10-CM

## 2023-02-24 NOTE — TELEPHONE ENCOUNTER
----- Message from Rey Mohan sent at 2/23/2023  3:35 PM CST -----  Contact: [phone]  Requesting a call back to schedule and appt with gyno.

## 2023-02-24 NOTE — TELEPHONE ENCOUNTER
Pt would like referral to gynecology states that she had cystocele repair in the past and having problems again.

## 2023-03-03 ENCOUNTER — OFFICE VISIT (OUTPATIENT)
Dept: PAIN MEDICINE | Facility: CLINIC | Age: 68
End: 2023-03-03
Payer: MEDICARE

## 2023-03-03 VITALS
SYSTOLIC BLOOD PRESSURE: 161 MMHG | HEIGHT: 62 IN | WEIGHT: 166.56 LBS | DIASTOLIC BLOOD PRESSURE: 95 MMHG | HEART RATE: 102 BPM | BODY MASS INDEX: 30.65 KG/M2

## 2023-03-03 DIAGNOSIS — G89.4 CHRONIC PAIN DISORDER: ICD-10-CM

## 2023-03-03 DIAGNOSIS — M51.36 DDD (DEGENERATIVE DISC DISEASE), LUMBAR: ICD-10-CM

## 2023-03-03 DIAGNOSIS — M47.816 LUMBAR SPONDYLOSIS: ICD-10-CM

## 2023-03-03 DIAGNOSIS — M46.1 SACROILIITIS: ICD-10-CM

## 2023-03-03 DIAGNOSIS — M54.16 LUMBAR RADICULOPATHY: Primary | ICD-10-CM

## 2023-03-03 PROCEDURE — 3080F PR MOST RECENT DIASTOLIC BLOOD PRESSURE >= 90 MM HG: ICD-10-PCS | Mod: HCNC,CPTII,S$GLB, | Performed by: ANESTHESIOLOGY

## 2023-03-03 PROCEDURE — 3288F PR FALLS RISK ASSESSMENT DOCUMENTED: ICD-10-PCS | Mod: HCNC,CPTII,S$GLB, | Performed by: ANESTHESIOLOGY

## 2023-03-03 PROCEDURE — 1101F PT FALLS ASSESS-DOCD LE1/YR: CPT | Mod: HCNC,CPTII,S$GLB, | Performed by: ANESTHESIOLOGY

## 2023-03-03 PROCEDURE — 1126F AMNT PAIN NOTED NONE PRSNT: CPT | Mod: HCNC,CPTII,S$GLB, | Performed by: ANESTHESIOLOGY

## 2023-03-03 PROCEDURE — 1157F PR ADVANCE CARE PLAN OR EQUIV PRESENT IN MEDICAL RECORD: ICD-10-PCS | Mod: HCNC,CPTII,S$GLB, | Performed by: ANESTHESIOLOGY

## 2023-03-03 PROCEDURE — 1157F ADVNC CARE PLAN IN RCRD: CPT | Mod: HCNC,CPTII,S$GLB, | Performed by: ANESTHESIOLOGY

## 2023-03-03 PROCEDURE — 3077F SYST BP >= 140 MM HG: CPT | Mod: HCNC,CPTII,S$GLB, | Performed by: ANESTHESIOLOGY

## 2023-03-03 PROCEDURE — 3077F PR MOST RECENT SYSTOLIC BLOOD PRESSURE >= 140 MM HG: ICD-10-PCS | Mod: HCNC,CPTII,S$GLB, | Performed by: ANESTHESIOLOGY

## 2023-03-03 PROCEDURE — 99999 PR PBB SHADOW E&M-EST. PATIENT-LVL IV: CPT | Mod: PBBFAC,HCNC,, | Performed by: ANESTHESIOLOGY

## 2023-03-03 PROCEDURE — 1159F MED LIST DOCD IN RCRD: CPT | Mod: HCNC,CPTII,S$GLB, | Performed by: ANESTHESIOLOGY

## 2023-03-03 PROCEDURE — 1101F PR PT FALLS ASSESS DOC 0-1 FALLS W/OUT INJ PAST YR: ICD-10-PCS | Mod: HCNC,CPTII,S$GLB, | Performed by: ANESTHESIOLOGY

## 2023-03-03 PROCEDURE — 99213 OFFICE O/P EST LOW 20 MIN: CPT | Mod: HCNC,S$GLB,, | Performed by: ANESTHESIOLOGY

## 2023-03-03 PROCEDURE — 1126F PR PAIN SEVERITY QUANTIFIED, NO PAIN PRESENT: ICD-10-PCS | Mod: HCNC,CPTII,S$GLB, | Performed by: ANESTHESIOLOGY

## 2023-03-03 PROCEDURE — 1159F PR MEDICATION LIST DOCUMENTED IN MEDICAL RECORD: ICD-10-PCS | Mod: HCNC,CPTII,S$GLB, | Performed by: ANESTHESIOLOGY

## 2023-03-03 PROCEDURE — 3288F FALL RISK ASSESSMENT DOCD: CPT | Mod: HCNC,CPTII,S$GLB, | Performed by: ANESTHESIOLOGY

## 2023-03-03 PROCEDURE — 3080F DIAST BP >= 90 MM HG: CPT | Mod: HCNC,CPTII,S$GLB, | Performed by: ANESTHESIOLOGY

## 2023-03-03 PROCEDURE — 99213 PR OFFICE/OUTPT VISIT, EST, LEVL III, 20-29 MIN: ICD-10-PCS | Mod: HCNC,S$GLB,, | Performed by: ANESTHESIOLOGY

## 2023-03-03 PROCEDURE — 99999 PR PBB SHADOW E&M-EST. PATIENT-LVL IV: ICD-10-PCS | Mod: PBBFAC,HCNC,, | Performed by: ANESTHESIOLOGY

## 2023-03-03 PROCEDURE — 3008F PR BODY MASS INDEX (BMI) DOCUMENTED: ICD-10-PCS | Mod: HCNC,CPTII,S$GLB, | Performed by: ANESTHESIOLOGY

## 2023-03-03 PROCEDURE — 3008F BODY MASS INDEX DOCD: CPT | Mod: HCNC,CPTII,S$GLB, | Performed by: ANESTHESIOLOGY

## 2023-03-03 NOTE — PROGRESS NOTES
Interventional Pain Progress Note     Referring Physician: No ref. provider found    PCP: Elis Wick MD    Chief Complaint: Low Back Pain    Interval History (03/03/2023):  Patient returns to clinic after procedure.  Patient reports 100% relief and thoracic pain after T11-T12 interlaminar epidural steroid injection on 02/02/2023.  Patient reports occasional throbbing pain across her right lower back with no radiation to her buttocks or lower extremities.  Pain is worse with lifting and prolonged standing, better with heat and topicals.  Pain is rated as a year out 10 currently. Denies any fevers, chills, changes in gait, weakness, or bowel and bladder incontinence        Interval History (1/13/22):  Patient returns to clinic for evaluation of thoracic pain.  Patient reports approximately 2 weeks ago she had sudden onset of lower thoracic pain.  She denies any significant inciting factors to his pain.  Pain starts as a sharp stabbing pain in her lower midline thoracic area and then radiates to her right side to her anterior chest wall.  Pain is worse with standing and walking, better with lying supine.  Pain is rated an 8/10.  Patient reports that this pain is significantly different from her previous lumbar pain. Denies any fevers, chills,  or bowel and bladder incontinence        Interval History (9/30/22):  Patient returns to clinic after procedure.  Patient reports complete resolution of right lower extremity pain after right sacroiliac joint injection and right L5-S1 facet injection on 08/22/2022.  Primary pain today is tight aching pain in right side of lower back.  Pain is worse with extension and rotation, better with rest and heat.  Patient does report an episode of physical therapy on 09/21/2022 where she experienced increased muscle aches and weakness.  She reports that since this time her symptoms have greatly improved.  She denies any significant weakness today.  Pain is rated a 7/10. Denies  any fevers, chills, changes in gait, weakness, or bowel and bladder incontinence        SUBJECTIVE:    Nadia Damon is a 67 y.o. female who presents to the clinic for the evaluation of lower back pain. The pain started 1 year ago and symptoms have been worsening.The pain is located in the right lower back and right buttocks area with radiation to the posterior lateral aspect of her right thigh and occasionally her right calf.  The pain is described as aching, shooting and stabbing and is rated as 5/10.   The pain is rated with a score of  2/10 on the BEST day and a score of 9/10 on the WORST day.  Symptoms interfere with daily activity and work. The pain is exacerbated by Sitting, Standing, Extension and Flexing.  The pain is mitigated by physical therapy and rest.     Patient denies night fever/night sweats, urinary incontinence, bowel incontinence, significant weight loss, significant motor weakness and loss of sensations.      Non-Pharmacologic Treatments:  Physical Therapy/Home Exercise: yes  Ice/Heat:yes  TENS: no  Acupuncture: no  Massage: no  Chiropractic: no        Previous Pain Medications:  Tylenol, topicals, neuropathic,      report:  Reviewed and consistent with medication use as prescribed.    Pain Procedures:   02/02/2023:  T11-12 interlaminar epidural steroid injection, 100% relief  12/7/22:  Right L4-5 and L5-S1 radiofrequency ablation, 75% relief  8/22/22:  Right sacroiliac joint and right L5-S1 facet injection,   Resolution of right lower extremity pain    Imaging:     CT THORACIC SPINE WITHOUT CONTRAST 1/9/23     CLINICAL HISTORY:  Myelopathy, acute, thoracic spine;  Radiculopathy, thoracic region     TECHNIQUE:  Helical axial images are acquired through the thoracic spine without IV contrast.  Images were reformatted in the coronal sagittal plane.     COMPARISON:  None     FINDINGS:  Paraspinal soft tissues appear within normal limits.  Partially visualized lung fields demonstrate band  like opacity at the lingula.  Favor scarring/atelectasis.  Partially visualized abdominal contents are within normal limits.     Alignment is within normal limits.  There is no anterolisthesis or retrolisthesis.  Vertebral body heights are relatively well preserved.  There appears to be a chronic fracture deformity along the posterior spinous process of C7.  Borders of the bone fragment are well corticated.  No evidence of acute injury.  Partially visualized ribs appear intact.     Evaluation for central canal narrowing is limited without intrathecal contrast.     Multilevel degenerative disc changes are present, worst at the mid to lower thoracic spine.  Degenerative disc changes are worst at T11-T12.  There is a 5 mm disc bulge/herniation at T11-T12.  Disc bulge/herniation is slightly high density with minimal peripheral calcification.     No bony neural foraminal narrowing from C7 to T11.  There is moderate to severe right and mild-to-moderate left neural foraminal narrowing.     Impression:     1. Degenerative disc changes, worst at T11-T12.  Evaluation for central canal narrowing is limited without intrathecal contrast.  2. Bony neural foraminal narrowing is worst at the right T11-T12 foramen.         Results for orders placed during the hospital encounter of 07/13/22    X-Ray Lumbar Spine 5 View    Narrative  EXAMINATION:  XR LUMBAR SPINE COMPLETE 5 VIEW    CLINICAL HISTORY:  Sacrococcygeal disorders, not elsewhere classified    TECHNIQUE:  AP, lateral, spot and bilateral oblique views of the lumbar spine were performed.    COMPARISON:  Lumbar spine radiographs May 17, 2018    FINDINGS:  Minimal grade 1 anterolisthesis of L4 on L5.  No fracture or pars defect.  Unchanged mild disc height loss at the L4-L5 level.  Moderate to severe degenerative disc disease at the T11-T12 level.  Prominent inferior lumbar spine facet arthropathy.  Sacroiliac joints appear normal.  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  No osseous erosion or suspicious osseous lesion.    Impression  As above.      Electronically signed by: Isac Bell  Date:    07/13/2022  Time:    15:39          Past Medical History:   Diagnosis Date    Abdominal wall hernia     CT Renal 6/11/2018---Small fat containing superior ventral abdominal wall hernia at the epicardial pacing lead site.    Abnormal mammogram 10/12/2021    Anxiety     Arthritis     ZEN HIPS    Breast cancer in female 08/2021    LEFT BREAST    C. difficile colitis 11/29/2021    Cellulitis of axilla, left 12/23/2021    Chronic diastolic heart failure 12/16/2021    Chronic kidney disease     stage 4, GFR 15-29 ml/min    Chronic midline low back pain without sciatica 06/18/2018    Closed nondisplaced fracture of distal phalanx of left great toe with routine healing 10/22/2018    Coronary artery disease 1993    heart transplant    Cystitis 5/10/2022    Depression     Fibromyalgia     on Lyrica    Heart failure     native heart cardiomyopathy    Heart transplanted 1993    due to cardiomyopathy    History of hyperparathyroidism; Hyperparathyroidism, secondary renal     PT DENIES    Hypertension     Immune disorder     anti rejection meds    Iron deficiency anemia 08/15/2017    Kidney stones     passed per pt    Obesity     Other osteoporosis without current pathological fracture 08/30/2019    Severe sepsis 11/22/2021    Shingles 2003 approx    left leg    Trouble in sleeping     Urinary incontinence      Past Surgical History:   Procedure Laterality Date    BLADDER SURGERY  2015 approx    mesh - Dr Everett then 2nd reconstructive sx Dr Onofre    BREAST BIOPSY Bilateral     NEGATIVE    BREAST BIOPSY Right 10/31/2022    benign    BREAST LUMPECTOMY Left 2021    BREAST SURGERY Left 09/28/2015    Bx - benign    BREAST SURGERY Right 12/2015    Bx benign    CARDIAC PACEMAKER REMOVAL Left 06/26/2014    Pacer defirillator removed. Put in 1993 aat  time of heart transplant    CARPAL TUNNEL RELEASE Left 03/03/2015    Dr. Hall    COLONOSCOPY N/A 02/25/2021    Procedure: COLONOSCOPY;  Surgeon: Freida Ramirez MD;  Location: [facility] ENDO;  Service: Endoscopy;  Laterality: N/A;    EPIDURAL STEROID INJECTION INTO CERVICAL SPINE N/A 2/2/2023    Procedure: T11/T12 IL HELLEN;  Surgeon: Jassi Pierre MD;  Location: [facility] PAIN MGT;  Service: Pain Management;  Laterality: N/A;    HEART TRANSPLANT  1993    HERNIA REPAIR Right 1971 approx    Inguinal    HYSTERECTOMY  1983    vag hyst /LSO     INCISION AND DRAINAGE OF ABSCESS Left 12/24/2021    Procedure: INCISION AND DRAINAGE, ABSCESS;  Surgeon: Joseph Longo MD;  Location: [facility] OR;  Service: General;  Laterality: Left;    INJECTION OF ANESTHETIC AGENT AROUND MEDIAL BRANCH NERVES INNERVATING LUMBAR FACET JOINT Right 10/19/2022    Procedure: Right L4/L5 and L5/S1 MBB;  Surgeon: Jassi Pierre MD;  Location: [facility] PAIN MGT;  Service: Pain Management;  Laterality: Right;    INJECTION OF ANESTHETIC AGENT AROUND MEDIAL BRANCH NERVES INNERVATING LUMBAR FACET JOINT Right 11/09/2022    Procedure: Right L4/L5 and L5/S1 MBB;  Surgeon: Jassi Pierre MD;  [facility];  Service: Pain Management;  Laterality: Right;    INJECTION OF ANESTHETIC AGENT INTO SACROILIAC JOINT Right 08/22/2022    Procedure: Right SIJ Injection Right L5/S1 Facte Injection;  Surgeon: Jassi Pierre MD;  Location: [facility] PAIN MGT;  Service: Pain Management;  Laterality: Right;    INSERTION OF TUNNELED CENTRAL VENOUS CATHETER (CVC) WITH SUBCUTANEOUS PORT N/A 11/09/2021    Procedure: [id number];  Surgeon: Christoph Douglas MD;  Location: [facility] OR;  Service: General;  Laterality: N/A;    RADIOFREQUENCY THERMOCOAGULATION Right 12/7/2022    Procedure: Right L4/L5 and L5/S1 Lumbar RFA;  Surgeon: Jassi Pierre MD;  [facility]: [facility];  Service: Pain Management;  Laterality: Right;    REMOVAL OF VASCULAR ACCESS PORT      SENTINEL  LYMPH NODE BIOPSY Left 10/12/2021    Procedure: BIOPSY, LYMPH NODE, SENTINEL;  Surgeon: Christoph Douglas MD;  Location: [facility];  Service: General;  Laterality: Left;    TOE SURGERY       Social History     Socioeconomic History    Marital status: Single    Number of children: 2    Highest education level: 11th grade   Occupational History    Occupation: Retired   Tobacco Use    Smoking status: Never    Smokeless tobacco: Never   Substance and Sexual Activity    Alcohol use: Never     Alcohol/week: 0.0 standard drinks    Drug use: No    Sexual activity: Not Currently     Partners: Male     Birth control/protection: See Surgical Hx   Other Topics Concern    Are you pregnant or think you may be? No    Breast-feeding No   Social History Narrative    Single. 2 children , 1  at 30 yoa   strep throat -  pneumonia and renal complications after not completing course of AB. Other child lives in [place], [place]. Has a cousin locally that could help in an emergency. 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 Caffeine intake =- 1 cola a day. No coffee, + occasional tea, avoids caffeine especially at night. Still drives. She does not have a Living Will or Advanced directive.      Family History   Problem Relation Age of Onset    Cancer Mother 38        breast    Breast cancer Mother     Breast cancer Maternal Grandmother     Heart disease Maternal Grandmother     Hypertension Son     Cataracts Cousin     Diabetes Neg Hx     Stroke Neg Hx     Kidney disease Neg Hx     Asthma Neg Hx     COPD Neg Hx     Melanoma Neg Hx     Hyperlipidemia Neg Hx        Review of patient's allergies indicates:   Allergen Reactions    Lisinopril Swelling and Rash    Augmentin [amoxicillin-pot clavulanate] Diarrhea       Current Outpatient Medications   Medication Sig    amLODIPine (NORVASC) 2.5 MG tablet Take 1 tablet (2.5 mg total) by mouth once daily.    aspirin (ECOTRIN) 81 MG EC tablet Take 1 tablet (81 mg total)  by mouth once daily.    biotin 10,000 mcg Cap Take 1 tablet by mouth once daily.    busPIRone (BUSPAR) 10 MG tablet TAKE 1 TABLET EVERY DAY    calcitonin, salmon, (FORTICAL) 200 unit/actuation nasal spray 1 spray by Nasal route once daily.    carvediloL (COREG) 6.25 MG tablet Take 1 tablet (6.25 mg total) by mouth 2 (two) times daily with meals.    cycloSPORINE modified, NEORAL, (NEORAL) 25 MG capsule TAKE 3 CAPSULES (75 MG TOTAL) BY MOUTH 2 (TWO) TIMES DAILY.    DULoxetine (CYMBALTA) 30 MG capsule TAKE 1 CAPSULE EVERY DAY    EVENING PRIMROSE OIL ORAL Take 1,000 mg by mouth once daily.    furosemide (LASIX) 20 MG tablet Take 1 tablet (20 mg total) by mouth once daily.    hydrALAZINE (APRESOLINE) 50 MG tablet Take 1 tablet (50 mg total) by mouth every 8 (eight) hours. TAKE 1 TABLETS  EVERY 8  HOURS.    LIDOcaine (LIDODERM) 5 % Place 2 patches onto the skin once daily. Remove & Discard patch within 12 hours or as directed by MD    multivitamin capsule Take 1 capsule by mouth once daily.    oxyCODONE-acetaminophen (PERCOCET) 7.5-325 mg per tablet Take 1 tablet by mouth every 8 (eight) hours as needed for Pain.    pantoprazole (PROTONIX) 40 MG tablet TAKE 1 TABLET EVERY DAY    pregabalin (LYRICA) 50 MG capsule Take 1 capsule (50 mg total) by mouth 2 (two) times daily. NOON & NIGHT TIME    psyllium husk (METAMUCIL ORAL) Take by mouth.    RETACRIT 20,000 unit/mL injection     temazepam (RESTORIL) 30 mg capsule TAKE 1 CAPSULE AT BEDTIME    tiZANidine (ZANAFLEX) 4 MG tablet TAKE 1 TABLET TWICE DAILY AS NEEDED    UNABLE TO FIND medication name: Stool softener (Ducolax) Laxative    vitamin E 400 UNIT capsule Take 400 Units by mouth once daily.    zolpidem (AMBIEN) 10 mg Tab TAKE 1 TABLET EVERY EVENING     Current Facility-Administered Medications   Medication    denosumab (PROLIA) injection 60 mg     Facility-Administered Medications Ordered in Other Visits   Medication    lactated ringers infusion         ROS  Review of  "Systems   Constitutional:  Negative for chills, diaphoresis, fatigue and fever.   Respiratory:  Negative for chest tightness, shortness of breath, wheezing and stridor.    Cardiovascular:  Positive for leg swelling. Negative for chest pain.   Gastrointestinal:  Negative for blood in stool, diarrhea, nausea and vomiting.   Endocrine: Negative for cold intolerance and heat intolerance.   Genitourinary:  Negative for dysuria, hematuria and urgency.   Musculoskeletal:  Positive for arthralgias and back pain. Negative for gait problem, joint swelling, myalgias, neck pain and neck stiffness.   Skin:  Negative for rash.   Neurological:  Negative for tremors, seizures, speech difficulty, weakness, light-headedness, numbness and headaches.   Hematological:  Does not bruise/bleed easily.   Psychiatric/Behavioral:  Negative for agitation, confusion and suicidal ideas. The patient is not nervous/anxious.           OBJECTIVE:  BP (!) 161/95   Pulse 102   Ht 5' 2" (1.575 m)   Wt 75.5 kg (166 lb 8.9 oz)   LMP 06/20/1983 (Within Years)   BMI 30.46 kg/m²         Physical Exam  Constitutional:       General: She is not in acute distress.     Appearance: Normal appearance. She is not ill-appearing.   HENT:      Head: Normocephalic and atraumatic.      Nose: No congestion or rhinorrhea.      Mouth/Throat:      Mouth: Mucous membranes are moist.   Eyes:      Extraocular Movements: Extraocular movements intact.      Pupils: Pupils are equal, round, and reactive to light.   Cardiovascular:      Pulses: Normal pulses.   Pulmonary:      Effort: Pulmonary effort is normal.   Abdominal:      General: Abdomen is flat.      Palpations: Abdomen is soft.   Musculoskeletal:      Cervical back: Normal range of motion and neck supple.   Skin:     General: Skin is warm and dry.      Capillary Refill: Capillary refill takes less than 2 seconds.   Neurological:      General: No focal deficit present.      Mental Status: She is alert and oriented " to person, place, and time.      Cranial Nerves: No cranial nerve deficit.      Sensory: No sensory deficit.      Motor: No weakness or abnormal muscle tone.      Gait: Gait normal.      Deep Tendon Reflexes: Babinski sign absent on the right side. Babinski sign absent on the left side.      Reflex Scores:       Patellar reflexes are 2+ on the right side and 2+ on the left side.       Achilles reflexes are 2+ on the right side and 2+ on the left side.  Psychiatric:         Mood and Affect: Mood normal.         Behavior: Behavior normal.         Thought Content: Thought content normal.         Musculoskeletal:      Lumbar Exam  Incision: no  Pain with Flexion: yes  Pain with Extension: yes  ROM:  Improved compared to prior visit  Paraspinous TTP:  Right lumbar paraspinous  Facet TTP:  L5-S1  Facet Loading:  Positive on the right  SLR:  Negative bilaterally  SIJ TTP:  Negative bilaterally  ALBERTO:  Negative bilaterally      LABS:  Lab Results   Component Value Date    WBC 5.67 02/08/2023    HGB 9.3 (L) 02/08/2023    HCT 29.1 (L) 02/08/2023    MCV 90 02/08/2023     02/08/2023       CMP  Sodium   Date Value Ref Range Status   02/08/2023 138 136 - 145 mmol/L Final     Potassium   Date Value Ref Range Status   02/08/2023 5.0 3.5 - 5.1 mmol/L Final     Chloride   Date Value Ref Range Status   02/08/2023 105 95 - 110 mmol/L Final     CO2   Date Value Ref Range Status   02/08/2023 21 (L) 23 - 29 mmol/L Final     Glucose   Date Value Ref Range Status   02/08/2023 176 (H) 70 - 110 mg/dL Final     BUN   Date Value Ref Range Status   02/08/2023 30 (H) 8 - 23 mg/dL Final     Creatinine   Date Value Ref Range Status   02/08/2023 2.2 (H) 0.5 - 1.4 mg/dL Final     Calcium   Date Value Ref Range Status   02/08/2023 8.5 (L) 8.7 - 10.5 mg/dL Final     Total Protein   Date Value Ref Range Status   01/04/2023 7.7 6.0 - 8.4 g/dL Final     Albumin   Date Value Ref Range Status   01/04/2023 3.3 (L) 3.5 - 5.2 g/dL Final   01/04/2023  3.3 (L) 3.5 - 5.2 g/dL Final     Total Bilirubin   Date Value Ref Range Status   01/04/2023 0.5 0.1 - 1.0 mg/dL Final     Comment:     For infants and newborns, interpretation of results should be based  on gestational age, weight and in agreement with clinical  observations.    Premature Infant recommended reference ranges:  Up to 24 hours.............<8.0 mg/dL  Up to 48 hours............<12.0 mg/dL  3-5 days..................<15.0 mg/dL  6-29 days.................<15.0 mg/dL       Alkaline Phosphatase   Date Value Ref Range Status   01/04/2023 137 (H) 55 - 135 U/L Final     AST   Date Value Ref Range Status   01/04/2023 36 10 - 40 U/L Final     ALT   Date Value Ref Range Status   01/04/2023 21 10 - 44 U/L Final     Anion Gap   Date Value Ref Range Status   02/08/2023 12 8 - 16 mmol/L Final     eGFR if    Date Value Ref Range Status   07/14/2022 19 (A) >60 mL/min/1.73 m^2 Final     eGFR if non    Date Value Ref Range Status   07/14/2022 17 (A) >60 mL/min/1.73 m^2 Final     Comment:     Calculation used to obtain the estimated glomerular filtration  rate (eGFR) is the CKD-EPI equation.          Lab Results   Component Value Date    HGBA1C 5.2 12/24/2021             ASSESSMENT:       67 y.o. year old female with lower back pain, consistent with     1. Lumbar radiculopathy        2. Chronic pain disorder        3. Lumbar spondylosis        4. Sacroiliitis        5. DDD (degenerative disc disease), lumbar            Lumbar radiculopathy    Chronic pain disorder    Lumbar spondylosis    Sacroiliitis    DDD (degenerative disc disease), lumbar               PLAN:   - Interventions:    None at this time  02/02/2023:  T11-12 interlaminar epidural steroid injection, 100% relief  -12/7/22:  Right L4-5 and L5-S1 radiofrequency ablation, 75% relief  -8/22/22:  Right sacroiliac joint and right L5-S1 facet injection,   Resolution of right lower extremity pain  - Anticoagulation use:   no no  anticoagulation    - Medications:   Continue Tylenol, Cymbalta, and Lyrica as prescribed by primary care    - Therapy:    Continue formal physical therapy    - Imaging:   CT of thoracic spine reviewed.  Significant for right eccentric disc bulge at T11-12 with foraminal stenosis   X-ray Lumbar  reviewed. Significant for grade 1 anterolisthesis of L4 upon L5.  Degenerative disc disease at T11-T12.  As well as pseudo joint formation of right L5 transverse process with superior sacrum     - Consults:   Continue follow-up with cardiology for previous heart transplant    - Counseled patient regarding the importance of activity modification and physical therapy    - Patient Questions: Answered all of the patient's questions regarding diagnosis, therapy, and treatment    - Follow up visit:  Return to clinic p.r.n.      The above plan and management options were discussed at length with patient. Patient is in agreement with the above and verbalized understanding.    I discussed the goals of interventional chronic pain management with the patient on today's visit.  I explained the utility of injections for diagnostic and therapeutic purposes.  We discussed a multimodal approach to pain including treating the patient's given worst pain at any given time.  We will use a systematic approach to addressing pain.  We will also adopt a multimodal approach that includes injections, adjuvant medications, physical therapy, at times psychiatry.  There may be a limited role for opioid use intermittently in the treatment of pain, more particularly for acute pain although no one approach can be used as a sole treatment modality.    I emphasized the importance of regular exercise, core strengthening and stretching, diet and weight loss as a cornerstone of long-term pain management.      Jassi Pierre MD  Interventional Pain Management  [facility]    Disclaimer:  This note was prepared using voice recognition system and is  likely to have sound alike errors that may have been overlooked even after proof reading.  Please call me with any questions

## 2023-03-08 ENCOUNTER — LAB VISIT (OUTPATIENT)
Dept: LAB | Facility: HOSPITAL | Age: 68
End: 2023-03-08
Attending: NURSE PRACTITIONER
Payer: MEDICARE

## 2023-03-08 DIAGNOSIS — E03.9 HYPOTHYROIDISM, UNSPECIFIED TYPE: ICD-10-CM

## 2023-03-08 DIAGNOSIS — R73.9 HYPERGLYCEMIA: ICD-10-CM

## 2023-03-08 LAB
ALBUMIN SERPL BCP-MCNC: 3.5 G/DL (ref 3.5–5.2)
ANION GAP SERPL CALC-SCNC: 10 MMOL/L (ref 8–16)
BUN SERPL-MCNC: 30 MG/DL (ref 8–23)
CALCIUM SERPL-MCNC: 9.1 MG/DL (ref 8.7–10.5)
CHLORIDE SERPL-SCNC: 110 MMOL/L (ref 95–110)
CO2 SERPL-SCNC: 23 MMOL/L (ref 23–29)
CREAT SERPL-MCNC: 2.2 MG/DL (ref 0.5–1.4)
EST. GFR  (NO RACE VARIABLE): 23.8 ML/MIN/1.73 M^2
ESTIMATED AVG GLUCOSE: 100 MG/DL (ref 68–131)
GLUCOSE SERPL-MCNC: 105 MG/DL (ref 70–110)
HBA1C MFR BLD: 5.1 % (ref 4–5.6)
PHOSPHATE SERPL-MCNC: 4.8 MG/DL (ref 2.7–4.5)
POTASSIUM SERPL-SCNC: 4.5 MMOL/L (ref 3.5–5.1)
SODIUM SERPL-SCNC: 143 MMOL/L (ref 136–145)
TSH SERPL DL<=0.005 MIU/L-ACNC: 8.91 UIU/ML (ref 0.4–4)

## 2023-03-08 PROCEDURE — 84439 ASSAY OF FREE THYROXINE: CPT | Mod: HCNC | Performed by: NURSE PRACTITIONER

## 2023-03-08 PROCEDURE — 84443 ASSAY THYROID STIM HORMONE: CPT | Mod: HCNC | Performed by: NURSE PRACTITIONER

## 2023-03-08 PROCEDURE — 83036 HEMOGLOBIN GLYCOSYLATED A1C: CPT | Mod: HCNC | Performed by: NURSE PRACTITIONER

## 2023-03-08 PROCEDURE — 36415 COLL VENOUS BLD VENIPUNCTURE: CPT | Mod: HCNC | Performed by: NURSE PRACTITIONER

## 2023-03-08 PROCEDURE — 80069 RENAL FUNCTION PANEL: CPT | Mod: HCNC | Performed by: NURSE PRACTITIONER

## 2023-03-09 LAB — T4 FREE SERPL-MCNC: 0.93 NG/DL (ref 0.71–1.51)

## 2023-03-14 DIAGNOSIS — M51.35 DDD (DEGENERATIVE DISC DISEASE), THORACOLUMBAR: ICD-10-CM

## 2023-03-14 RX ORDER — LIDOCAINE 50 MG/G
2 PATCH TOPICAL DAILY
Qty: 60 PATCH | Refills: 2 | Status: SHIPPED | OUTPATIENT
Start: 2023-03-14 | End: 2023-05-29

## 2023-03-22 ENCOUNTER — OFFICE VISIT (OUTPATIENT)
Dept: OBSTETRICS AND GYNECOLOGY | Facility: CLINIC | Age: 68
End: 2023-03-22
Payer: MEDICARE

## 2023-03-22 VITALS
DIASTOLIC BLOOD PRESSURE: 82 MMHG | WEIGHT: 172.81 LBS | BODY MASS INDEX: 31.8 KG/M2 | SYSTOLIC BLOOD PRESSURE: 152 MMHG | HEIGHT: 62 IN

## 2023-03-22 DIAGNOSIS — N39.3 SUI (STRESS URINARY INCONTINENCE, FEMALE): Primary | ICD-10-CM

## 2023-03-22 DIAGNOSIS — N99.3 PROLAPSE OF VAGINAL CUFF AFTER HYSTERECTOMY: ICD-10-CM

## 2023-03-22 PROCEDURE — 1126F PR PAIN SEVERITY QUANTIFIED, NO PAIN PRESENT: ICD-10-PCS | Mod: HCNC,CPTII,S$GLB, | Performed by: OBSTETRICS & GYNECOLOGY

## 2023-03-22 PROCEDURE — 1157F ADVNC CARE PLAN IN RCRD: CPT | Mod: HCNC,CPTII,S$GLB, | Performed by: OBSTETRICS & GYNECOLOGY

## 2023-03-22 PROCEDURE — 3008F PR BODY MASS INDEX (BMI) DOCUMENTED: ICD-10-PCS | Mod: HCNC,CPTII,S$GLB, | Performed by: OBSTETRICS & GYNECOLOGY

## 2023-03-22 PROCEDURE — 3044F HG A1C LEVEL LT 7.0%: CPT | Mod: HCNC,CPTII,S$GLB, | Performed by: OBSTETRICS & GYNECOLOGY

## 2023-03-22 PROCEDURE — 3044F PR MOST RECENT HEMOGLOBIN A1C LEVEL <7.0%: ICD-10-PCS | Mod: HCNC,CPTII,S$GLB, | Performed by: OBSTETRICS & GYNECOLOGY

## 2023-03-22 PROCEDURE — 3077F PR MOST RECENT SYSTOLIC BLOOD PRESSURE >= 140 MM HG: ICD-10-PCS | Mod: HCNC,CPTII,S$GLB, | Performed by: OBSTETRICS & GYNECOLOGY

## 2023-03-22 PROCEDURE — 3288F FALL RISK ASSESSMENT DOCD: CPT | Mod: HCNC,CPTII,S$GLB, | Performed by: OBSTETRICS & GYNECOLOGY

## 2023-03-22 PROCEDURE — 1159F MED LIST DOCD IN RCRD: CPT | Mod: HCNC,CPTII,S$GLB, | Performed by: OBSTETRICS & GYNECOLOGY

## 2023-03-22 PROCEDURE — 1101F PT FALLS ASSESS-DOCD LE1/YR: CPT | Mod: HCNC,CPTII,S$GLB, | Performed by: OBSTETRICS & GYNECOLOGY

## 2023-03-22 PROCEDURE — 1126F AMNT PAIN NOTED NONE PRSNT: CPT | Mod: HCNC,CPTII,S$GLB, | Performed by: OBSTETRICS & GYNECOLOGY

## 2023-03-22 PROCEDURE — 3008F BODY MASS INDEX DOCD: CPT | Mod: HCNC,CPTII,S$GLB, | Performed by: OBSTETRICS & GYNECOLOGY

## 2023-03-22 PROCEDURE — 1159F PR MEDICATION LIST DOCUMENTED IN MEDICAL RECORD: ICD-10-PCS | Mod: HCNC,CPTII,S$GLB, | Performed by: OBSTETRICS & GYNECOLOGY

## 2023-03-22 PROCEDURE — 1101F PR PT FALLS ASSESS DOC 0-1 FALLS W/OUT INJ PAST YR: ICD-10-PCS | Mod: HCNC,CPTII,S$GLB, | Performed by: OBSTETRICS & GYNECOLOGY

## 2023-03-22 PROCEDURE — 99204 OFFICE O/P NEW MOD 45 MIN: CPT | Mod: HCNC,S$GLB,, | Performed by: OBSTETRICS & GYNECOLOGY

## 2023-03-22 PROCEDURE — 3077F SYST BP >= 140 MM HG: CPT | Mod: HCNC,CPTII,S$GLB, | Performed by: OBSTETRICS & GYNECOLOGY

## 2023-03-22 PROCEDURE — 3288F PR FALLS RISK ASSESSMENT DOCUMENTED: ICD-10-PCS | Mod: HCNC,CPTII,S$GLB, | Performed by: OBSTETRICS & GYNECOLOGY

## 2023-03-22 PROCEDURE — 1157F PR ADVANCE CARE PLAN OR EQUIV PRESENT IN MEDICAL RECORD: ICD-10-PCS | Mod: HCNC,CPTII,S$GLB, | Performed by: OBSTETRICS & GYNECOLOGY

## 2023-03-22 PROCEDURE — 99204 PR OFFICE/OUTPT VISIT, NEW, LEVL IV, 45-59 MIN: ICD-10-PCS | Mod: HCNC,S$GLB,, | Performed by: OBSTETRICS & GYNECOLOGY

## 2023-03-22 PROCEDURE — 99999 PR PBB SHADOW E&M-EST. PATIENT-LVL IV: ICD-10-PCS | Mod: PBBFAC,HCNC,, | Performed by: OBSTETRICS & GYNECOLOGY

## 2023-03-22 PROCEDURE — 3079F DIAST BP 80-89 MM HG: CPT | Mod: HCNC,CPTII,S$GLB, | Performed by: OBSTETRICS & GYNECOLOGY

## 2023-03-22 PROCEDURE — 99999 PR PBB SHADOW E&M-EST. PATIENT-LVL IV: CPT | Mod: PBBFAC,HCNC,, | Performed by: OBSTETRICS & GYNECOLOGY

## 2023-03-22 PROCEDURE — 3079F PR MOST RECENT DIASTOLIC BLOOD PRESSURE 80-89 MM HG: ICD-10-PCS | Mod: HCNC,CPTII,S$GLB, | Performed by: OBSTETRICS & GYNECOLOGY

## 2023-03-22 NOTE — PROGRESS NOTES
"  Subjective:       Patient ID: Nadia Damon is a 67 y.o. female.    Chief Complaint:  No chief complaint on file.      History of Present Illness  HPI  Having vaginal bulging and loss of urine with stress and no obvious urge   Hysterectomy with USO in    Anterior vaginal repair with mesh, followed by repeat procedure with removal of the mesh by history from the patient   No sling     Health Maintenance   Topic Date Due    Lipid Panel  10/04/2023    Mammogram  2023    DEXA Scan  2026    TETANUS VACCINE  2030    Hepatitis C Screening  Completed     GYN & OB History  Patient's last menstrual period was 1983 (within years).   Date of Last Pap: No result found    OB History    Para Term  AB Living   4 2 2     2   SAB IAB Ectopic Multiple Live Births                  # Outcome Date GA Lbr Tirso/2nd Weight Sex Delivery Anes PTL Lv   4 Term            3 Term            2       Vag-Spont      1       Vag-Spont          Review of Systems  Review of Systems        Objective:   BP (!) 152/82   Ht 5' 2" (1.575 m)   Wt 78.4 kg (172 lb 13.5 oz)   LMP 1983 (Within Years)   BMI 31.61 kg/m²    Physical Exam:             Abdominal: Soft. Bowel sounds are normal. She exhibits no distension, no mass and no abdominal incision. There is no abdominal tenderness. There is no guarding. No hernia.     Genitourinary:    Inguinal canal, vagina and rectum normal.   The external female genitalia was normal.   There is no rash, tenderness or lesion on the right labia. There is no rash, tenderness or lesion on the left labia. Right adnexum displays no mass, no tenderness and no fullness. Left adnexum displays no mass, no tenderness and no fullness. There is unspecified prolapse of vaginal walls in the vagina. No  no vaginal discharge, tenderness, bleeding, rectocele or cystocele in the vagina.    No foreign body in the vagina.      No signs of injury in the vagina.   Vaginal " atrophy noted. Cervix is absent.Uterus is absent. Normal urethral meatus.Urethra findings: no tendernessBladder findings: no bladder tenderness   Genitourinary Comments: There is good anterior and posterior support   The cuff is protruding to 1 cm from the vaginal introitus   when replaced, there is no anterior relaxation noted                       Assessment:        1. FRANKY (stress urinary incontinence, female)    2. Prolapse of vaginal cuff after hysterectomy                Plan:            Diagnoses and all orders for this visit:    FRANKY (stress urinary incontinence, female)  -     Ambulatory referral/consult to Urology; Future    Prolapse of vaginal cuff after hysterectomy  -     Ambulatory referral/consult to Urology; Future    Will consider robotic colposacropexy with possible Viera procedure after Urodynamic studies   Offered pessary, patient is not interested

## 2023-03-28 ENCOUNTER — OFFICE VISIT (OUTPATIENT)
Dept: PRIMARY CARE CLINIC | Facility: CLINIC | Age: 68
End: 2023-03-28
Payer: MEDICARE

## 2023-03-28 ENCOUNTER — TELEPHONE (OUTPATIENT)
Dept: SURGERY | Facility: CLINIC | Age: 68
End: 2023-03-28
Payer: MEDICARE

## 2023-03-28 VITALS
WEIGHT: 170 LBS | HEART RATE: 88 BPM | SYSTOLIC BLOOD PRESSURE: 129 MMHG | BODY MASS INDEX: 31.28 KG/M2 | TEMPERATURE: 98 F | HEIGHT: 62 IN | DIASTOLIC BLOOD PRESSURE: 76 MMHG | OXYGEN SATURATION: 98 %

## 2023-03-28 DIAGNOSIS — D63.1 ANEMIA ASSOCIATED WITH STAGE 4 CHRONIC RENAL FAILURE: ICD-10-CM

## 2023-03-28 DIAGNOSIS — G47.00 INSOMNIA, UNSPECIFIED TYPE: ICD-10-CM

## 2023-03-28 DIAGNOSIS — D05.12 DUCTAL CARCINOMA IN SITU (DCIS) OF LEFT BREAST: ICD-10-CM

## 2023-03-28 DIAGNOSIS — N18.4 ANEMIA ASSOCIATED WITH STAGE 4 CHRONIC RENAL FAILURE: ICD-10-CM

## 2023-03-28 DIAGNOSIS — I10 ESSENTIAL HYPERTENSION: ICD-10-CM

## 2023-03-28 DIAGNOSIS — N95.2 VAGINAL ATROPHY: ICD-10-CM

## 2023-03-28 DIAGNOSIS — R92.8 FOLLOW-UP EXAMINATION OF ABNORMAL MAMMOGRAM: Primary | ICD-10-CM

## 2023-03-28 DIAGNOSIS — K59.01 SLOW TRANSIT CONSTIPATION: ICD-10-CM

## 2023-03-28 DIAGNOSIS — N18.4 CKD (CHRONIC KIDNEY DISEASE) STAGE 4, GFR 15-29 ML/MIN: Primary | ICD-10-CM

## 2023-03-28 PROCEDURE — 3288F PR FALLS RISK ASSESSMENT DOCUMENTED: ICD-10-PCS | Mod: HCNC,CPTII,S$GLB, | Performed by: INTERNAL MEDICINE

## 2023-03-28 PROCEDURE — 3044F HG A1C LEVEL LT 7.0%: CPT | Mod: HCNC,CPTII,S$GLB, | Performed by: INTERNAL MEDICINE

## 2023-03-28 PROCEDURE — 3078F PR MOST RECENT DIASTOLIC BLOOD PRESSURE < 80 MM HG: ICD-10-PCS | Mod: HCNC,CPTII,S$GLB, | Performed by: INTERNAL MEDICINE

## 2023-03-28 PROCEDURE — 3008F PR BODY MASS INDEX (BMI) DOCUMENTED: ICD-10-PCS | Mod: HCNC,CPTII,S$GLB, | Performed by: INTERNAL MEDICINE

## 2023-03-28 PROCEDURE — 1126F AMNT PAIN NOTED NONE PRSNT: CPT | Mod: HCNC,CPTII,S$GLB, | Performed by: INTERNAL MEDICINE

## 2023-03-28 PROCEDURE — 1101F PT FALLS ASSESS-DOCD LE1/YR: CPT | Mod: HCNC,CPTII,S$GLB, | Performed by: INTERNAL MEDICINE

## 2023-03-28 PROCEDURE — 1157F ADVNC CARE PLAN IN RCRD: CPT | Mod: HCNC,CPTII,S$GLB, | Performed by: INTERNAL MEDICINE

## 2023-03-28 PROCEDURE — 99215 OFFICE O/P EST HI 40 MIN: CPT | Mod: HCNC,S$GLB,, | Performed by: INTERNAL MEDICINE

## 2023-03-28 PROCEDURE — 3074F SYST BP LT 130 MM HG: CPT | Mod: HCNC,CPTII,S$GLB, | Performed by: INTERNAL MEDICINE

## 2023-03-28 PROCEDURE — 3044F PR MOST RECENT HEMOGLOBIN A1C LEVEL <7.0%: ICD-10-PCS | Mod: HCNC,CPTII,S$GLB, | Performed by: INTERNAL MEDICINE

## 2023-03-28 PROCEDURE — 1157F PR ADVANCE CARE PLAN OR EQUIV PRESENT IN MEDICAL RECORD: ICD-10-PCS | Mod: HCNC,CPTII,S$GLB, | Performed by: INTERNAL MEDICINE

## 2023-03-28 PROCEDURE — 3008F BODY MASS INDEX DOCD: CPT | Mod: HCNC,CPTII,S$GLB, | Performed by: INTERNAL MEDICINE

## 2023-03-28 PROCEDURE — 99999 PR PBB SHADOW E&M-EST. PATIENT-LVL V: ICD-10-PCS | Mod: PBBFAC,HCNC,, | Performed by: INTERNAL MEDICINE

## 2023-03-28 PROCEDURE — 99215 PR OFFICE/OUTPT VISIT, EST, LEVL V, 40-54 MIN: ICD-10-PCS | Mod: HCNC,S$GLB,, | Performed by: INTERNAL MEDICINE

## 2023-03-28 PROCEDURE — 3074F PR MOST RECENT SYSTOLIC BLOOD PRESSURE < 130 MM HG: ICD-10-PCS | Mod: HCNC,CPTII,S$GLB, | Performed by: INTERNAL MEDICINE

## 2023-03-28 PROCEDURE — 1126F PR PAIN SEVERITY QUANTIFIED, NO PAIN PRESENT: ICD-10-PCS | Mod: HCNC,CPTII,S$GLB, | Performed by: INTERNAL MEDICINE

## 2023-03-28 PROCEDURE — 99999 PR PBB SHADOW E&M-EST. PATIENT-LVL V: CPT | Mod: PBBFAC,HCNC,, | Performed by: INTERNAL MEDICINE

## 2023-03-28 PROCEDURE — 3078F DIAST BP <80 MM HG: CPT | Mod: HCNC,CPTII,S$GLB, | Performed by: INTERNAL MEDICINE

## 2023-03-28 PROCEDURE — 3288F FALL RISK ASSESSMENT DOCD: CPT | Mod: HCNC,CPTII,S$GLB, | Performed by: INTERNAL MEDICINE

## 2023-03-28 PROCEDURE — 1101F PR PT FALLS ASSESS DOC 0-1 FALLS W/OUT INJ PAST YR: ICD-10-PCS | Mod: HCNC,CPTII,S$GLB, | Performed by: INTERNAL MEDICINE

## 2023-03-28 RX ORDER — POLYETHYLENE GLYCOL 3350 17 G/17G
17 POWDER, FOR SOLUTION ORAL DAILY
COMMUNITY

## 2023-03-28 NOTE — PROGRESS NOTES
Patient ID: Nadia Damon is a 67 y.o. female.    Chief Complaint:     HPI: Patient presents for breast cancer survivorship and surveillance    DIAGNOSIS: L breast microinvasive carcinoma, pTmi(m) N1mi(sn) cM0, ER/CA negative, high grade     TREATMENT HISTORY:   1. L lumpectomy 9/10/21  2. L sentinel node biopsy 10/12/21  3. Attempted chemotherapy  4. 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 She was treated with 1 cycle of systemic chemotherapy cytoxan and taxotere and udenyca that was discontinued due to toxicity. She had radiation, completed 4/14/22.   Pt heart transplant patient 28 years.     Pt has CKD and is on epo injections and hematology monitors labs    Pt had [facility] diagnostic mammo and bilateral ultrasounds of breasts that revealed a complex cyst at 9 oclock- 6 mon f/u was recommended- pt was anxious - ultrasound core biopsy performed 10/31/2022- benign results    Risk factors identified:     Menarche at 14 y/o  G 2 P 2  First pregnancy at 16  LMP: partial hyst - 1984  Estrogen:none  Radiation to the neck or chest wall- none  Prior breast biopsies or atypical hyperplasia- right breast excisional biopsy- benign- left core biopsy benign in past       FH: mother breast cancer at 40's.     Body mass index is 30.63 kg/m².    Review of Systems   Constitutional: Negative.    HENT: Negative.     Eyes: Negative.    Respiratory: Negative.     Cardiovascular: Negative.    Gastrointestinal: Negative.         No reflux   Endocrine: Negative.    Genitourinary: Negative.    Musculoskeletal:  Positive for back pain (chronic=improving).        Pt relates left shoulder pain and stiffness since completed radiation- did not have PT-    Skin: Negative.    Allergic/Immunologic: Negative.    Neurological: Negative.    Hematological: Negative.  Negative for adenopathy.   Psychiatric/Behavioral:  Negative.     Breast: Pt denies any breast pain, has left lateral chest wall tenderness after last biopsy, nipple discharge, or palpable mass. No prior trauma or bruising.     Current Outpatient Medications   Medication Sig Dispense Refill    amLODIPine (NORVASC) 2.5 MG tablet Take 1 tablet (2.5 mg total) by mouth once daily. 90 tablet 3    aspirin (ECOTRIN) 81 MG EC tablet Take 1 tablet (81 mg total) by mouth once daily. 90 tablet 3    biotin 10,000 mcg Cap Take 1 tablet by mouth once daily.      busPIRone (BUSPAR) 10 MG tablet TAKE 1 TABLET EVERY DAY 90 tablet 3    carvediloL (COREG) 6.25 MG tablet Take 1 tablet (6.25 mg total) by mouth 2 (two) times daily with meals. 180 tablet 3    cycloSPORINE modified, NEORAL, (NEORAL) 25 MG capsule TAKE 3 CAPSULES (75 MG TOTAL) BY MOUTH 2 (TWO) TIMES DAILY. 540 capsule 6    EVENING PRIMROSE OIL ORAL Take 1,000 mg by mouth once daily.      furosemide (LASIX) 20 MG tablet Take 1 tablet (20 mg total) by mouth once daily. 90 tablet 1    hydrALAZINE (APRESOLINE) 50 MG tablet Take 1 tablet (50 mg total) by mouth every 8 (eight) hours. TAKE 1 TABLETS  EVERY 8  HOURS. 270 tablet 3    LIDOcaine (LIDODERM) 5 % Place 2 patches onto the skin once daily. Remove & Discard patch within 12 hours or as directed by MD 60 patch 2    multivitamin capsule Take 1 capsule by mouth once daily.      pantoprazole (PROTONIX) 40 MG tablet TAKE 1 TABLET EVERY DAY 90 tablet 1    polyethylene glycol (GLYCOLAX) 17 gram/dose powder Take 17 g by mouth once daily.      pregabalin (LYRICA) 50 MG capsule Take 1 capsule (50 mg total) by mouth 2 (two) times daily. NOON & NIGHT TIME 180 capsule 1    psyllium husk (METAMUCIL ORAL) Take by mouth.      temazepam (RESTORIL) 30 mg capsule TAKE 1 CAPSULE AT BEDTIME 30 capsule 5    UNABLE TO FIND medication name: Stool softener (Ducolax) Laxative      vitamin E 400 UNIT capsule Take 400 Units by mouth once daily.      zolpidem (AMBIEN) 10 mg Tab TAKE 1 TABLET EVERY  EVENING 90 tablet 0    calcitonin, salmon, (FORTICAL) 200 unit/actuation nasal spray 1 spray by Nasal route once daily. 3 each 3     No current facility-administered medications for this visit.       Review of patient's allergies indicates:   Allergen Reactions    Lisinopril Swelling and Rash    Augmentin [amoxicillin-pot clavulanate] Diarrhea       Past Medical History:   Diagnosis Date    Abdominal wall hernia     CT Renal 6/11/2018---Small fat containing superior ventral abdominal wall hernia at the epicardial pacing lead site.    Abnormal mammogram 10/12/2021    Anxiety     Arthritis     ZEN HIPS    Breast cancer in female 08/2021    LEFT BREAST    C. difficile colitis 11/29/2021    Cellulitis of axilla, left 12/23/2021    Chronic diastolic heart failure 12/16/2021    Chronic kidney disease     stage 4, GFR 15-29 ml/min    Chronic midline low back pain without sciatica 06/18/2018    Closed nondisplaced fracture of distal phalanx of left great toe with routine healing 10/22/2018    Coronary artery disease 1993    heart transplant    Cystitis 5/10/2022    Depression     Fibromyalgia     on Lyrica    Heart failure     native heart cardiomyopathy    Heart transplanted 1993    due to cardiomyopathy    History of hyperparathyroidism; Hyperparathyroidism, secondary renal     PT DENIES    Hypertension     Immune disorder     anti rejection meds    Iron deficiency anemia 08/15/2017    Kidney stones     passed per pt    Obesity     Other osteoporosis without current pathological fracture 08/30/2019    Severe sepsis 11/22/2021    Shingles 2003 approx    left leg    Trouble in sleeping     Urinary incontinence        Past Surgical History:   Procedure Laterality Date    BLADDER SURGERY  2015 approx    mesh - Dr Everett then 2nd reconstructive sx Dr Onofre    BREAST BIOPSY Bilateral     NEGATIVE    BREAST BIOPSY Right 10/31/2022    benign    BREAST LUMPECTOMY Left 2021    BREAST SURGERY Left 09/28/2015    Bx - benign     BREAST SURGERY Right 12/2015    Bx benign    CARDIAC PACEMAKER REMOVAL Left 06/26/2014    Pacer defirillator removed. Put in 1993 aat time of heart transplant    CARPAL TUNNEL RELEASE Left 03/03/2015    Dr. Hall    COLONOSCOPY N/A 02/25/2021    Procedure: COLONOSCOPY;  Surgeon: Freida Ramirez MD;  Location: [facility] ENDO;  Service: Endoscopy;  Laterality: N/A;    CYSTOCELE REPAIR      Twice with mesh removal    EPIDURAL STEROID INJECTION INTO CERVICAL SPINE N/A 02/02/2023    Procedure: T11/T12 IL HELLEN;  Surgeon: Jassi Pierre MD;  Location: [facility] PAIN MGT;  Service: Pain Management;  Laterality: N/A;    HEART TRANSPLANT  1993    HERNIA REPAIR Right 1971 approx    Inguinal    HYSTERECTOMY  1983    vag hyst /LSO     INCISION AND DRAINAGE OF ABSCESS Left 12/24/2021    Procedure: INCISION AND DRAINAGE, ABSCESS;  Surgeon: Joseph Longo MD;  Location: [facility];  Service: General;  Laterality: Left;    INJECTION OF ANESTHETIC AGENT AROUND MEDIAL BRANCH NERVES INNERVATING LUMBAR FACET JOINT Right 10/19/2022    Procedure: Right L4/L5 and L5/S1 MBB;  Surgeon: Jassi Pierre MD;  Location: [facility] PAIN MGT;  Service: Pain Management;  Laterality: Right;    INJECTION OF ANESTHETIC AGENT AROUND MEDIAL BRANCH NERVES INNERVATING LUMBAR FACET JOINT Right 11/09/2022    Procedure: Right L4/L5 and L5/S1 MBB;  Surgeon: Jassi Pierre MD;  Location: [facility] PAIN MGT;  Service: Pain Management;  Laterality: Right;    INJECTION OF ANESTHETIC AGENT INTO SACROILIAC JOINT Right 08/22/2022    Procedure: Right SIJ Injection Right L5/S1 Facte Injection;  Surgeon: Jassi Pierre MD;  Location: [facility] PAIN MGT;  Service: Pain Management;  Laterality: Right;    INSERTION OF TUNNELED CENTRAL VENOUS CATHETER (CVC) WITH SUBCUTANEOUS PORT N/A 11/09/2021    Procedure: [id number];  Surgeon: Christoph Douglas MD;  Location: [facility] OR;  Service: General;  Laterality: N/A;    RADIOFREQUENCY THERMOCOAGULATION Right 12/07/2022    Procedure: Right  L4/L5 and L5/S1 Lumbar RFA;  Surgeon: Jassi Pierre MD;  Location: [facility] MGT;  Service: Pain Management;  Laterality: Right;    REMOVAL OF VASCULAR ACCESS PORT      SENTINEL LYMPH NODE BIOPSY Left 10/12/2021    Procedure: BIOPSY, LYMPH NODE, SENTINEL;  Surgeon: Christoph Douglas MD;  Location: [facility] OR;  Service: General;  Laterality: Left;    TOE SURGERY         Family History   Problem Relation Age of Onset    Cancer Mother 38        breast    Breast cancer Mother     Breast cancer Maternal Grandmother     Heart disease Maternal Grandmother     Hypertension Son     Cataracts Cousin     Diabetes Neg Hx     Stroke Neg Hx     Kidney disease Neg Hx     Asthma Neg Hx     COPD Neg Hx     Melanoma Neg Hx     Hyperlipidemia Neg Hx        Social History     Socioeconomic History    Marital status: Single    Number of children: 2    Highest education level: 11th grade   Occupational History    Occupation: Retired   Tobacco Use    Smoking status: Never    Smokeless tobacco: Never   Substance and Sexual Activity    Alcohol use: Never     Alcohol/week: 0.0 standard drinks    Drug use: No    Sexual activity: Not Currently     Partners: Male     Birth control/protection: See Surgical Hx   Other Topics Concern    Are you pregnant or think you may be? No    Breast-feeding No   Social History Narrative    Single. 2 children , 1  at 31 yo2014 strep throat -  pneumonia and renal complications after not completing course of AB. Other child lives in [place], [place]. Has a cousin locally that could help in an emergency. 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 Caffeine intake =- 1 cola a day. No coffee, + occasional tea, avoids caffeine especially at night. Still drives. She does not have a Living Will or Advanced directive.        Vitals:    04/10/23 1049   BP: 119/78   Pulse: 94   Resp: 16         Physical Exam  Constitutional:       Appearance: She is well-developed.   HENT:      Head:  Normocephalic and atraumatic.      Right Ear: External ear normal.      Left Ear: External ear normal.      Mouth/Throat:      Pharynx: No oropharyngeal exudate.   Eyes:      General: No scleral icterus.        Right eye: No discharge.         Left eye: No discharge.      Conjunctiva/sclera: Conjunctivae normal.      Pupils: Pupils are equal, round, and reactive to light.   Neck:      Thyroid: No thyromegaly.   Cardiovascular:      Rate and Rhythm: Normal rate and regular rhythm.      Heart sounds: Normal heart sounds.   Pulmonary:      Effort: Pulmonary effort is normal.      Breath sounds: Normal breath sounds.   Chest:   Breasts:     Right: No inverted nipple, mass, nipple discharge, skin change or tenderness.      Left: No inverted nipple, mass, nipple discharge, skin change or tenderness.   Abdominal:      General: Bowel sounds are normal.      Palpations: Abdomen is soft.   Musculoskeletal:         General: Tenderness (left axilla and anterior shoulder tightness- no lymphedema) present.      Right shoulder: No crepitus. Normal strength.      Cervical back: Normal range of motion and neck supple.   Lymphadenopathy:      Head:      Right side of head: No submental, submandibular, tonsillar, preauricular, posterior auricular or occipital adenopathy.      Left side of head: No submental, submandibular, tonsillar, preauricular, posterior auricular or occipital adenopathy.      Cervical: No cervical adenopathy.      Right cervical: No superficial or posterior cervical adenopathy.     Left cervical: No superficial or posterior cervical adenopathy.      Upper Body:      Right upper body: No supraclavicular adenopathy.      Left upper body: No supraclavicular adenopathy.   Skin:     General: Skin is warm and dry.      Coloration: Skin is not pale.      Findings: No erythema or rash.   Neurological:      Mental Status: She is alert and oriented to person, place, and time.      Deep Tendon Reflexes: Reflexes are normal  and symmetric.   Psychiatric:         Behavior: Behavior normal.         Thought Content: Thought content normal.         Judgment: Judgment normal.     9/19/2022  Result:   Mammo Digital Diagnostic Bilat with Iván  US Breast Bilateral Limited     History:  Patient is 67 y.o. and is seen for diagnostic imaging.     Films Compared:  Prior images (if available) were compared.     Findings:  This procedure was performed using tomosynthesis. Computer-aided detection was utilized in the interpretation of this examination.  The breasts have scattered areas of fibroglandular density.         Left breast demonstrates postoperative lumpectomy findings of the upper outer breast with post radiation skin thickening.  No detrimental mammographic change appreciated.  Left axillary region postsurgical findings present without concerning mammographic mass.            Ultrasound was performed at the area of pain within the left axilla without corresponding sonographic abnormality.  Follow-up imaging at the 3 o'clock position demonstrates resolution of previously noted fluid with ill-defined hypoechoic area felt to represent postsurgical scarring.           Right breast demonstrates multiple lateral oval masses with the more anterior mass larger when compared to 07/13/2021.  Ultrasound demonstrates multiple complex cysts at 09:00 o'clock corresponding to mammographic lesions.  Largest cyst measures 9 mm and corresponds to the more anterior mammographic lesion.  Six-month follow-up can be obtained.            Impression:  Right breast 09:00 o'clock complex cyst. Six-month follow-up recommended.   No axillary abnormality at the area pain with postsurgical and radiation changes of the left breast. Continue follow-up recommended.         BI-RADS Category:   Overall: 3 - Probably Benign     Recommendation:  Short interval follow-up is recommended in 6 Months.      10/31/2022  Final Pathologic Diagnosis 1. Right breast (3 o'clock  position), biopsy:       -  Benign breast tissue with fibrosis and marked duct ectasia with   microcyst formation and papillary         apocrine metaplasia       -  Negative for atypia or malignancy        Bilateral diagnostic mammo today- ultrasounds canceled per radiologist- results pending    Assessment & Plan:  Secondary and unspecified malignant neoplasm of axilla and upper limb lymph nodes    Ductal carcinoma in situ (DCIS) of left breast    Encounter for follow-up surveillance of breast cancer    Encounter for breast cancer screening using non-mammogram modality    Counseling on health promotion and disease prevention    Counseling and coordination of care    Negative clinical findings on exam.  [facility] diagnostic mammo today- results pending  S/p benign core biopsy right breast 10/2022   RTC October for exam  BSE encouraged- call for any changes  Eating healthy diet and exercising encouraged  Left axilla tightness with ROM- refer to PT for evaluation and treatment

## 2023-03-28 NOTE — PROGRESS NOTES
Nadia Damon  03/28/2023  [id number]    Elis Wick MD  Patient Care Team:  Elis Wick MD as PCP - General (Internal Medicine)  Miguel Soni Jr., MD as Consulting Physician (Vascular Surgery)  Ike King MD as Consulting Physician (Cardiology)  Courtney Tubbs MD as Consulting Physician (Cardiology)  Carter Crawford MD as Consulting Physician (Nephrology)  Paxton Vasques OD as Consulting Physician (Optometry)  PRANAY Villalobos MD as Obstetrician (Obstetrics)  Parker Mccarthy IV, MD (Urology)  Ike King MD as Consulting Physician (Cardiology)  Jose Roland MD as Consulting Physician (Dermatology)  Prasanth Johnson MD as Consulting Physician (Rheumatology)  Nora Morin LCSW as   Elis Wick MD as Physician (Internal Medicine)    Visit Type: Follow-up    Chief Complaint:  Chief Complaint   Patient presents with    1 mth f/u      History of Present Illness: Ms. Damon is a 67 year old female followed by Stacey Perrault, [facility], since October 2021. Last seen by me May 2022. Last visit w Luz 2/13/23.    Ms. Damon w h/o heart transplant 1993, on anti-rejection medication. 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 She was treated with 1 cycle of systemic chemotherapy cytoxan and taxotere and udenyca that was discontinued due to toxicity. She has been followed by radiation oncology and completed last radiation treatment 5/14/22. She is still followed by Heme/Onc for Epo, initiated for anemia due to stage 4 CKD. Other medical issues include depresssion/anxiety/insomnia, hypertension, chronic diastolic CHF, and osteoporois for which she is receiving Prolia.     Home 's/80's  10 year anniversary of heart transplant  S/p HELLEN 2/2/23 has helped with pain  Vaginal prolapse, atrophy  Stress incontinence  Urinary  frequency  Constipation  Insomnia    Hosp/ED/Urgent Care:  02/02/2023 Pain Med Dr. Pierre T11-T12 [place] [facility]    Recent appointments:   3/22/23 Gyn Dr. Griselda WILKINS, vaginal prolapse  3/03/23 Pain Med Dr. Pierre sig relief after T11-T12 inj 02/02/2023 2/13/23 O[facility]+ Luz Randolph, NP   2/10/23 Opthal Layo B cataract  2/08/23 Heme/Onc BARON Burk GUSTAVO epo on hold    Upcoming appointments:  Future Appointments       Date Provider Specialty Appt Notes    4/10/2023  Radiology     4/10/2023  Radiology     4/10/2023 Kristine Delgado NP Breast Surgery exam    4/10/2023  Radiology     5/16/2023 BARON Daly-C Gastroenterology Constipation, unspecified constipation type     5/22/2023  Urology Urodynamics per GYN     7/6/2023  Lab     7/13/2023 Prasanth Johnson MD Rheumatology stephanie/bc          The following were reviewed: Active problem list, medication list, allergies, family history, social history, and Health Maintenance.     Medications have been reviewed and reconciled with patient at visit today.    Review of Systems   See HPI above    Exam: Initial VS /85 P 80 sat 98% temp 97.9  Vitals:    03/28/23 1609   BP: 129/76   Pulse: 88   Temp:      Weight: 77.1 kg (170 lb)   Body mass index is 31.09 kg/m².    BP Readings from Last 3 Encounters:   03/28/23 (!) 146/85   03/22/23 (!) 152/82   03/03/23 (!) 161/95      Wt Readings from Last 3 Encounters:   03/28/23 1455 77.1 kg (170 lb)   03/22/23 1132 78.4 kg (172 lb 13.5 oz)   03/03/23 1312 75.5 kg (166 lb 8.9 oz)     Physical Exam  Constitutional:       General: She is not in acute distress.     Appearance: Normal appearance. She is obese. She is not ill-appearing.   HENT:      Head: Normocephalic and atraumatic.      Right Ear: Tympanic membrane, ear canal and external ear normal.      Left Ear: Tympanic membrane, ear canal and external ear normal.      Nose: Nose normal.      Mouth/Throat:      Mouth: Mucous membranes are moist.      Pharynx: Oropharynx is  clear.   Eyes:      Extraocular Movements: Extraocular movements intact.      Conjunctiva/sclera: Conjunctivae normal.      Comments: glasses   Neck:      Vascular: No carotid bruit.   Cardiovascular:      Rate and Rhythm: Normal rate and regular rhythm.   Pulmonary:      Effort: Pulmonary effort is normal.      Breath sounds: Normal breath sounds.   Abdominal:      Palpations: Abdomen is soft.      Tenderness: There is no abdominal tenderness.   Musculoskeletal:      Right lower leg: Edema present.      Left lower leg: Edema present.   Lymphadenopathy:      Cervical: No cervical adenopathy.   Skin:     General: Skin is warm and dry.   Neurological:      General: No focal deficit present.      Mental Status: She is alert and oriented to person, place, and time.   Psychiatric:         Mood and Affect: Mood normal.         Behavior: Behavior normal.         Thought Content: Thought content normal.         Judgment: Judgment normal.      Laboratory Reviewed  Lab Results   Component Value Date    WBC 5.67 02/08/2023    HGB 9.3 (L) 02/08/2023    HCT 29.1 (L) 02/08/2023     02/08/2023    MCV 90 02/08/2023    CHOL 193 10/04/2022    TRIG 159 (H) 10/04/2022    HDL 46 10/04/2022    LDLCALC 115.2 10/04/2022    ALT 21 01/04/2023    AST 36 01/04/2023     03/08/2023    K 4.5 03/08/2023     03/08/2023    CREATININE 2.2 (H) 03/08/2023    BUN 30 (H) 03/08/2023    CO2 23 03/08/2023    MG 2.0 09/01/2022    TSH 8.908 (H) 03/08/2023    FREET4 0.93 03/08/2023    PSA <0.1 05/27/2008    INR 1.0 11/22/2021    HGBA1C 5.1 03/08/2023    CRP 19.0 (H) 09/30/2022     iron: 64, sat: 23%, ferritin: 163- WNL    DEXA 1/25/23    Assessment:   67 y.o. female with multiple co-morbid illnesses here for continued follow up of medical problems.      The primary encounter diagnosis was CKD (chronic kidney disease) stage 4, GFR 15-29 ml/min. Diagnoses of Vaginal atrophy, Essential hypertension, Anemia associated with stage 4 chronic renal  failure, Slow transit constipation, and Insomnia, unspecified type were also pertinent to this visit.      Plan:   1. CKD (chronic kidney disease) stage 4, GFR 15-29 ml/min  Overview:  US Retro   5/16/2018---Mild cortical thinning and increased cortical echogenicity.  Findings can be seen with medical renal disease.  8/31/216--- Echogenic kidneys with diffuse cortical thinning suggesting  medical renal disease. 2 complex right renal cortical cysts.    Orders:  -     Ambulatory referral/consult to Nephrology; Future; Expected date: 06/28/2023    2. Vaginal atrophy  Assessment & Plan:  Try scheduled toileting - consider pelvic floor PT?      3. Essential hypertension  Assessment & Plan:  Repeat BP ok - cont current Rx - monitor home BP      4. Anemia associated with stage 4 chronic renal failure  Assessment & Plan:  Cont monitor - referral to nephrology      5. Slow transit constipation  Assessment & Plan:  Fluid, fiber, movement - advise can use miralax, metamucil daily titrate as needed      6. Insomnia, unspecified type  Assessment & Plan:  She is open to weaning down ambien - cont work on sleep hygeine           Health Maintenance         Date Due Completion Date    COVID-19 Vaccine (3 - Moderna risk series) 08/10/2022 7/13/2022    Lipid Panel 10/04/2023 10/4/2022    Pneumococcal Vaccines (Age 65+) (3 - PPSV23 if available, else PCV20) 10/22/2023 10/22/2018    Mammogram 11/28/2023 11/28/2022    DEXA Scan 01/25/2026 1/25/2023    Colorectal Cancer Screening 02/25/2026 2/25/2021    Hemoglobin A1c (Diabetic Prevention Screening) 03/08/2026 3/8/2023    TETANUS VACCINE 09/09/2030 9/9/2020            -Patient's lab results were reviewed and discussed with patient  -Treatment options and alternatives were discussed with the patient. Patient expressed understanding. Patient was given the opportunity to ask questions and be an active participant in their medical care. Patient had no further questions or concerns at this  time.   -Patient is an overall moderate to HIGH risk for health complications from their medical conditions.     Follow up: Follow up in about 5 weeks (around 5/2/2023) for Follow Up w me , Follow Up fasting labs, Follow Up.    After visit summary printed and given to patient upon discharge.  Patient goals and care plan are included in After visit summary.    TOTAL TIME evaluating and managing this patient for this encounter was greater than 60 minutes. This time was spent personally by me on some of the following activities: review of patient's past medical history, assessing age-appropriate health maintenance needs, review of any interval history, review and interpretation of lab results, review and interpretation of imaging test results, review and interpretation of cardiology test results, reviewing consulting specialist notes, obtaining history from the patient and family, examination of the patient, medication reconciliation, managing and/or ordering prescription medications, ordering imaging tests, ordering referral to subspecialty provider(s), educating patient and answering their questions about diagnosis, treatment plan, and goals of treatment, discussing planned follow-up and final documentation of the visit. This time was exclusive of any separately billable procedures for this patient and exclusive of time spent treating any other patients.

## 2023-03-28 NOTE — PATIENT INSTRUCTIONS
Recommend try scheduled toileting Q 1 hr    Try taking miralax and metamucil every day - goal 1-2 large soft BM daily - can titrate miralax up to 3x daily  Also recommend increase fluid and fiber intake in general    Goal taper down Ambien and perhaps also temazepam  First step see if can decrease Ambien to 5mg QHS    See if Anthony can help you get back into [facility]

## 2023-04-10 ENCOUNTER — HOSPITAL ENCOUNTER (OUTPATIENT)
Dept: RADIOLOGY | Facility: HOSPITAL | Age: 68
Discharge: HOME | End: 2023-04-10
Attending: INTERNAL MEDICINE
Payer: MEDICARE

## 2023-04-10 ENCOUNTER — OFFICE VISIT (OUTPATIENT)
Dept: SURGERY | Facility: CLINIC | Age: 68
End: 2023-04-10
Payer: MEDICARE

## 2023-04-10 VITALS
WEIGHT: 170 LBS | RESPIRATION RATE: 16 BRPM | WEIGHT: 167.44 LBS | SYSTOLIC BLOOD PRESSURE: 119 MMHG | HEIGHT: 62 IN | HEIGHT: 62 IN | HEART RATE: 94 BPM | BODY MASS INDEX: 30.81 KG/M2 | BODY MASS INDEX: 31.28 KG/M2 | DIASTOLIC BLOOD PRESSURE: 78 MMHG

## 2023-04-10 DIAGNOSIS — Z71.89 COUNSELING AND COORDINATION OF CARE: ICD-10-CM

## 2023-04-10 DIAGNOSIS — Z08 ENCOUNTER FOR FOLLOW-UP SURVEILLANCE OF BREAST CANCER: ICD-10-CM

## 2023-04-10 DIAGNOSIS — Z85.3 ENCOUNTER FOR FOLLOW-UP SURVEILLANCE OF BREAST CANCER: ICD-10-CM

## 2023-04-10 DIAGNOSIS — C77.3 SECONDARY AND UNSPECIFIED MALIGNANT NEOPLASM OF AXILLA AND UPPER LIMB LYMPH NODES: Primary | ICD-10-CM

## 2023-04-10 DIAGNOSIS — D05.12 DUCTAL CARCINOMA IN SITU (DCIS) OF LEFT BREAST: ICD-10-CM

## 2023-04-10 DIAGNOSIS — G89.29 CHRONIC LEFT SHOULDER PAIN: ICD-10-CM

## 2023-04-10 DIAGNOSIS — M25.512 CHRONIC LEFT SHOULDER PAIN: ICD-10-CM

## 2023-04-10 DIAGNOSIS — Z71.89 COUNSELING ON HEALTH PROMOTION AND DISEASE PREVENTION: ICD-10-CM

## 2023-04-10 DIAGNOSIS — C77.3 SECONDARY AND UNSPECIFIED MALIGNANT NEOPLASM OF AXILLA AND UPPER LIMB LYMPH NODES: ICD-10-CM

## 2023-04-10 DIAGNOSIS — Z12.39 ENCOUNTER FOR BREAST CANCER SCREENING USING NON-MAMMOGRAM MODALITY: ICD-10-CM

## 2023-04-10 PROCEDURE — 77062 BREAST TOMOSYNTHESIS BI: CPT | Mod: 26,HCNC,, | Performed by: RADIOLOGY

## 2023-04-10 PROCEDURE — 99999 PR PBB SHADOW E&M-EST. PATIENT-LVL IV: ICD-10-PCS | Mod: PBBFAC,HCNC,, | Performed by: NURSE PRACTITIONER

## 2023-04-10 PROCEDURE — 3074F PR MOST RECENT SYSTOLIC BLOOD PRESSURE < 130 MM HG: ICD-10-PCS | Mod: HCNC,CPTII,S$GLB, | Performed by: NURSE PRACTITIONER

## 2023-04-10 PROCEDURE — 99214 PR OFFICE/OUTPT VISIT, EST, LEVL IV, 30-39 MIN: ICD-10-PCS | Mod: HCNC,S$GLB,, | Performed by: NURSE PRACTITIONER

## 2023-04-10 PROCEDURE — 99999 PR PBB SHADOW E&M-EST. PATIENT-LVL IV: CPT | Mod: PBBFAC,HCNC,, | Performed by: NURSE PRACTITIONER

## 2023-04-10 PROCEDURE — 3288F FALL RISK ASSESSMENT DOCD: CPT | Mod: HCNC,CPTII,S$GLB, | Performed by: NURSE PRACTITIONER

## 2023-04-10 PROCEDURE — 3078F DIAST BP <80 MM HG: CPT | Mod: HCNC,CPTII,S$GLB, | Performed by: NURSE PRACTITIONER

## 2023-04-10 PROCEDURE — 77066 DX MAMMO INCL CAD BI: CPT | Mod: 26,HCNC,, | Performed by: RADIOLOGY

## 2023-04-10 PROCEDURE — 1159F MED LIST DOCD IN RCRD: CPT | Mod: HCNC,CPTII,S$GLB, | Performed by: NURSE PRACTITIONER

## 2023-04-10 PROCEDURE — 3008F PR BODY MASS INDEX (BMI) DOCUMENTED: ICD-10-PCS | Mod: HCNC,CPTII,S$GLB, | Performed by: NURSE PRACTITIONER

## 2023-04-10 PROCEDURE — 1101F PR PT FALLS ASSESS DOC 0-1 FALLS W/OUT INJ PAST YR: ICD-10-PCS | Mod: HCNC,CPTII,S$GLB, | Performed by: NURSE PRACTITIONER

## 2023-04-10 PROCEDURE — 1101F PT FALLS ASSESS-DOCD LE1/YR: CPT | Mod: HCNC,CPTII,S$GLB, | Performed by: NURSE PRACTITIONER

## 2023-04-10 PROCEDURE — 1126F AMNT PAIN NOTED NONE PRSNT: CPT | Mod: HCNC,CPTII,S$GLB, | Performed by: NURSE PRACTITIONER

## 2023-04-10 PROCEDURE — 77066 MAMMO DIGITAL DIAGNOSTIC BILAT WITH TOMO: ICD-10-PCS | Mod: 26,HCNC,, | Performed by: RADIOLOGY

## 2023-04-10 PROCEDURE — 3044F HG A1C LEVEL LT 7.0%: CPT | Mod: HCNC,CPTII,S$GLB, | Performed by: NURSE PRACTITIONER

## 2023-04-10 PROCEDURE — 3078F PR MOST RECENT DIASTOLIC BLOOD PRESSURE < 80 MM HG: ICD-10-PCS | Mod: HCNC,CPTII,S$GLB, | Performed by: NURSE PRACTITIONER

## 2023-04-10 PROCEDURE — 1157F ADVNC CARE PLAN IN RCRD: CPT | Mod: HCNC,CPTII,S$GLB, | Performed by: NURSE PRACTITIONER

## 2023-04-10 PROCEDURE — 1157F PR ADVANCE CARE PLAN OR EQUIV PRESENT IN MEDICAL RECORD: ICD-10-PCS | Mod: HCNC,CPTII,S$GLB, | Performed by: NURSE PRACTITIONER

## 2023-04-10 PROCEDURE — 3074F SYST BP LT 130 MM HG: CPT | Mod: HCNC,CPTII,S$GLB, | Performed by: NURSE PRACTITIONER

## 2023-04-10 PROCEDURE — 3044F PR MOST RECENT HEMOGLOBIN A1C LEVEL <7.0%: ICD-10-PCS | Mod: HCNC,CPTII,S$GLB, | Performed by: NURSE PRACTITIONER

## 2023-04-10 PROCEDURE — 77062 BREAST TOMOSYNTHESIS BI: CPT | Mod: TC,HCNC

## 2023-04-10 PROCEDURE — 99214 OFFICE O/P EST MOD 30 MIN: CPT | Mod: HCNC,S$GLB,, | Performed by: NURSE PRACTITIONER

## 2023-04-10 PROCEDURE — 1159F PR MEDICATION LIST DOCUMENTED IN MEDICAL RECORD: ICD-10-PCS | Mod: HCNC,CPTII,S$GLB, | Performed by: NURSE PRACTITIONER

## 2023-04-10 PROCEDURE — 3288F PR FALLS RISK ASSESSMENT DOCUMENTED: ICD-10-PCS | Mod: HCNC,CPTII,S$GLB, | Performed by: NURSE PRACTITIONER

## 2023-04-10 PROCEDURE — 1126F PR PAIN SEVERITY QUANTIFIED, NO PAIN PRESENT: ICD-10-PCS | Mod: HCNC,CPTII,S$GLB, | Performed by: NURSE PRACTITIONER

## 2023-04-10 PROCEDURE — 77062 MAMMO DIGITAL DIAGNOSTIC BILAT WITH TOMO: ICD-10-PCS | Mod: 26,HCNC,, | Performed by: RADIOLOGY

## 2023-04-10 PROCEDURE — 3008F BODY MASS INDEX DOCD: CPT | Mod: HCNC,CPTII,S$GLB, | Performed by: NURSE PRACTITIONER

## 2023-04-10 NOTE — PROGRESS NOTES
Pt does not use [facility] pt portal - please call w message below:    Mammogram looks good! repeat mammograms as advised

## 2023-04-11 ENCOUNTER — CLINICAL SUPPORT (OUTPATIENT)
Dept: REHABILITATION | Facility: HOSPITAL | Age: 68
End: 2023-04-11
Payer: MEDICARE

## 2023-04-11 ENCOUNTER — TELEPHONE (OUTPATIENT)
Dept: PRIMARY CARE CLINIC | Facility: CLINIC | Age: 68
End: 2023-04-11
Payer: MEDICARE

## 2023-04-11 DIAGNOSIS — M25.512 CHRONIC LEFT SHOULDER PAIN: ICD-10-CM

## 2023-04-11 DIAGNOSIS — G89.29 CHRONIC LEFT SHOULDER PAIN: ICD-10-CM

## 2023-04-11 PROCEDURE — 97110 THERAPEUTIC EXERCISES: CPT | Mod: HCNC,PN

## 2023-04-11 PROCEDURE — 97161 PT EVAL LOW COMPLEX 20 MIN: CPT | Mod: HCNC,PN

## 2023-04-11 NOTE — PLAN OF CARE
OUTPATIENT PHYSICAL THERAPY   BREAST SURGERY POST-OP EVALUATION    Date: 4/11/2023   Name: Nadia Damon  Clinic Number: [id number]    Therapy Diagnosis:   Encounter Diagnosis   Name Primary?    Chronic left shoulder pain      Physician: Kristine Delgado, NP    Physician Orders: PT Eval and Treat   Medical Diagnosis: chronic left shoulder pain  Evaluation Date: 4/11/2023  Authorization period Expiration: 4/9/2024  Plan of Care Certification Period: 6/20/2023    Visit #: 1/ Visits authorized: 1  Time In:12:50 pm  Time Out: 1:30 pm  Total Billable Time: 25 minutes    Precautions: Standard    History   History of Present Illness: Nadia is a 67 y.o. female that presents to  [facility] the [facility] secondary to dx of left breast cancer.  She had surgery in 2021, with removal of 1 lymph from the left axilla and 6 weeks of radiation. Her chief complaint is loss of motion of left arm a tightness of the skin and muscle under the left axilla. She denies swelling of left arm/hand.         Past Medical History:   Past Medical History:   Diagnosis Date    Abdominal wall hernia     CT Renal 6/11/2018---Small fat containing superior ventral abdominal wall hernia at the epicardial pacing lead site.    Abnormal mammogram 10/12/2021    Anxiety     Arthritis     ZEN HIPS    Breast cancer in female 08/2021    LEFT BREAST    C. difficile colitis 11/29/2021    Cellulitis of axilla, left 12/23/2021    Chronic diastolic heart failure 12/16/2021    Chronic kidney disease     stage 4, GFR 15-29 ml/min    Chronic midline low back pain without sciatica 06/18/2018    Closed nondisplaced fracture of distal phalanx of left great toe with routine healing 10/22/2018    Coronary artery disease 1993    heart transplant    Cystitis 5/10/2022    Depression     Fibromyalgia     on Lyrica    Heart failure     native heart cardiomyopathy    Heart transplanted 1993    due to cardiomyopathy    History of  hyperparathyroidism; Hyperparathyroidism, secondary renal     PT DENIES    Hypertension     Immune disorder     anti rejection meds    Iron deficiency anemia 08/15/2017    Kidney stones     passed per pt    Obesity     Other osteoporosis without current pathological fracture 08/30/2019    Severe sepsis 11/22/2021    Shingles 2003 approx    left leg    Trouble in sleeping     Urinary incontinence        Past Surgical History:   Nadia Damon  has a past surgical history that includes Cardiac pacemaker removal (Left, 06/26/2014); Bladder surgery (2015 approx); Carpal tunnel release (Left, 03/03/2015); Hysterectomy (1983); Hernia repair (Right, 1971 approx); Breast surgery (Left, 09/28/2015); Breast surgery (Right, 12/2015); Toe Surgery; Colonoscopy (N/A, 02/25/2021); Breast biopsy (Bilateral); Heart transplant (1993); [place] lymph node biopsy (Left, 10/12/2021); Insertion of tunneled central venous catheter (CVC) with subcutaneous port (N/A, 11/09/2021); Incision and drainage of abscess (Left, 12/24/2021); Removal of vascular access port; Breast lumpectomy (Left, 2021); Injection of anesthetic agent into sacroiliac joint (Right, 08/22/2022); Injection of anesthetic agent around medial branch nerves innervating lumbar facet joint (Right, 10/19/2022); Injection of anesthetic agent around medial branch nerves innervating lumbar facet joint (Right, 11/09/2022); Breast biopsy (Right, 10/31/2022); Radiofrequency thermocoagulation (Right, 12/07/2022); Epidural steroid injection into cervical spine (N/A, 02/02/2023); and Cystocele repair.    Medications:  Nadia has a current medication list which includes the following prescription(s): amlodipine, aspirin, biotin, buspirone, calcitonin (salmon), carvedilol, cyclosporine modified (neoral), evening primrose oil, furosemide, hydralazine, lidocaine, multivitamin, pantoprazole, polyethylene glycol, pregabalin, psyllium husk, temazepam, UNABLE TO FIND, vitamin e, and  "zolpidem.    Allergies:  Review of patient's allergies indicates:   Allergen Reactions    Lisinopril Swelling and Rash    Augmentin [amoxicillin-pot clavulanate] Diarrhea          Hand dominance: right handed  Prior Therapy: none  Nutrition:  Normal  Social History: alone  Current functional status:  independent with difficulty using the left upper extremity   Exercise routine prior to onset : once a week exercise; 2x/week walking  DME owned: n/a  Work:  retired                       Subjective   Pt states: under left axilla  Pain: 6/10 on VAS. 9/10 at worst, 4/10 at best    Objective   Mental status :alert    Posture/Alignment   Postural examination/scapula alignment:   Joint integrity: WFLs  Skin integrity: intact  Edema: none noted    Sensation: Light Touch: Intact           Proprioception: Intact  - appearance: well groomed     ROM:   UPPER EXTREMITY--AROM/PROM  (R) UE: WNLs  (L) UE: WNLs     Shoulder Range of Motion:   ACTIVE ROM LEFT RIGHT   Flexion 90 180   Abduction 110 180   Extension wnl wnl   IR/90deg Reach to bra line Reach to bra line   ER/90deg Reach to back of head Reach to T2     Strength: manual muscle test grades below     Upper Extremity Strength  Right upper extremity - 4/5 throughout  Left upper extremity - 4-/5 throughout     Baseline Measurements of BL UE's for early detection of Lymphedema:     LANDMARK RIGHT UE LEFT UE DIFFERENCE   E + 8" 37 cm 39 cm 2 cm   E + 6" - cm - cm - cm   E + 4" 34.5 cm 33.5 cm - cm   E + 2" 32 cm 29.5 cm - cm   Elbow 26.5 cm 26.5 cm - cm   W+ 8" 26.5 cm 26.5 cm - cm   W +  6" - cm - cm - cm   W + 4" 20 cm 20.5 cm O.5 cm   Wrist 16 cm 16 cm - cm   DPC 18.5 cm 17.5 cm - cm   IP Thumb 5 cm 5.5 cm - cm       Coordination:   - fine motor: WFL  - UE coordination: intact     - LE coordination:  Not tested     Functional Mobility (Bed mobility, transfers)  Bed mobility: I =  independent   Roll to left: I  Roll to right: I  Supine to prone: I  Scooting to edge of bed: " I  Supine to sit: I  Sit to supine: I  Transfers to bed: I  Transfers to toilet: I  Sit to stand:  I  Stand pivot:  I  Car transfers: I      ADL's:  Feeding: I = independent   Grooming: I with difficulty due to left arm motion  Hygiene: I  UB Dressing: I  LB Dressing: I  Toileting: I  Bathing: I    Gait Assessment:   - AD used: none  - Assistance: independent  - Distance: community distances         Pt has no cultural, educational or language barriers to learning provided.    Treatment and Patient Education     Total Time Separate from Evaluation: 24 minutes    Patient participated in self care/home management for - minutes including the following:   -lymphedema handout to be provided at a later session      Nadia received therapeutic exercises to develop ROM and flexibility for (24) minutes including:     Intervention Performed Today    Supine overhead flexion x    Supine chicken wing x    Open books x    Modified child's pose x    Cat cow stretch x    Quadruped rock backs x                Plan for Next Visit: Will receive soft tissue mobilization to left shoulder and updated exercises prn for the left shoulder motion and strengthening for bilateral upper extremities/shoulders        Pt was able to demonstrate and report understanding and performance    Written Home Exercises Provided: yes.  Exercises were reviewed and Nadia was able to demonstrate them prior to the end of the session.  Nadia demonstrated good  understanding of the education provided.     See EMR under Patient Instructions for exercises provided 4/11/2023.      Assessment   This is a 67 y.o. female referred to outpatient physical therapy and presents with a medical diagnosis of left breast cancer and was seen today post-operatively to assess strength and ROM of BL UEs, to take baseline circumferential measurements of BL UEs to aid in the early detection of lymphedema and provide pt education on exercises/precations post breast surgery. Pt does  exhibit impairments as follows: impaired range of motion left shoulder, impaired bilateral upper extremities strength, tenderness to minimal touch around shoulder joint and scapula  Pt will be educated in lymphedema risks/precautions and decreasing risk for cellulitis at a later session. Time did not permit lymphedema education today.      Anticipated barriers to physical therapy: none     Pt's spiritual, cultural and educational needs considered and pt agreeable to plan of care and goals as stated below:     Medical necessity is demonstrated by the following IMPAIRMENTS/PROMBLEM LIST:  History  Co-morbidities and personal factors that may impact the plan of care Co-morbidities:   history of cancer and see above info    Personal Factors:   no deficits     low   Examination  Body Structures and Functions, activity limitations and participation restrictions that may impact the plan of care Body Regions:   upper extremities    Body Systems:    ROM  strength    Participation Restrictions:   none    Activity limitations:   Learning and applying knowledge  no deficits    General Tasks and Commands  no deficits    Communication  no deficits    Mobility  lifting and carrying objects    Self care  no deficits    Domestic Life  no deficits    Interactions/Relationships  no deficits    Life Areas  no deficits    Community and Social Life  recreation and leisure         low   Clinical Presentation stable and uncomplicated low   Decision Making/ Complexity Score: low     Goals: Pt agrees with goals set    Short Term goals: 5 weeks  1. Patient will demonstrate 100% understanding of lymphedema risk reduction practices to include self monitoring for lymphedema. (progressing, not met)  2. Patient will demonstrate independence with Home Exercise program established. (progressing, not met)  3. Pt will increase AROM/PROM in shoulder abduction ROM to 130 degrees on left to improve functional reach, carry, push, pull pain free.  (progressing, not met)  4. Pt will increase AROM/PROM in shoulder flexion to 115 degrees on right to improve functional reach, carry, push, pull pain free.(progressing, not met)      Long Term Goals: 10 weeks   1.  Pt will increase AROM/PROM in shoulder flexion to 170 degrees on right to improve functional reach, carry, push, pull pain free. (progressing, not met)  2. Pt will increase strength to 4+/5 in gross UE musculature to improve tolerance to all functional activities pain free. (progressing, not met)  3. Pt will demonstrate full/maximized tissue mobility to increase ROM and promote healthy tissue to be pain free at discharge. (progressing, not met)  4. Pt will report decrease in overall worst pain to 2/10 at discharge. (progressing, not met)  5. Pt will increase AROM/PROM in shoulder abduction ROM to 170 degrees on right to improve functional reach, carry, push, pull pain free. (progressing, not met)  6. Patient will report compliance with walking program 5x week for 20 min each day to improve overall cardiovascular function and decrease cancer related fatigue at discharge. (progressing, not met)      Plan   Outpatient physical therapy 2x week for 10 weeks to include the following:   Manual Therapy, Patient Education, Self Care, Therapeutic Activities, and Therapeutic Exercise.    Plan of care Certification: 4/11/2023 to 6/20/2023.      Therapist: Cornelia Tian, PT  4/11/2023

## 2023-04-20 DIAGNOSIS — G47.00 INSOMNIA, UNSPECIFIED TYPE: ICD-10-CM

## 2023-04-20 RX ORDER — TEMAZEPAM 30 MG/1
CAPSULE ORAL
Qty: 30 CAPSULE | Refills: 5 | Status: SHIPPED | OUTPATIENT
Start: 2023-04-20 | End: 2023-05-18 | Stop reason: SDUPTHER

## 2023-04-23 PROBLEM — R07.89 OTHER CHEST PAIN: Status: RESOLVED | Noted: 2022-04-05 | Resolved: 2023-04-23

## 2023-04-23 PROBLEM — I10 ESSENTIAL HYPERTENSION: Chronic | Status: ACTIVE | Noted: 2017-04-13

## 2023-04-23 PROBLEM — E83.42 HYPOMAGNESEMIA: Status: RESOLVED | Noted: 2022-05-16 | Resolved: 2023-04-23

## 2023-04-23 PROBLEM — N17.9 AKI (ACUTE KIDNEY INJURY): Status: RESOLVED | Noted: 2021-11-22 | Resolved: 2023-04-23

## 2023-04-23 PROBLEM — N18.4 ANEMIA ASSOCIATED WITH STAGE 4 CHRONIC RENAL FAILURE: Chronic | Status: ACTIVE | Noted: 2022-01-20

## 2023-04-23 PROBLEM — R19.7 DIARRHEA: Status: RESOLVED | Noted: 2021-11-24 | Resolved: 2023-04-23

## 2023-04-23 PROBLEM — D72.819 LEUKOPENIA: Status: RESOLVED | Noted: 2022-11-07 | Resolved: 2023-04-23

## 2023-04-23 PROBLEM — D63.1 ANEMIA ASSOCIATED WITH STAGE 4 CHRONIC RENAL FAILURE: Chronic | Status: ACTIVE | Noted: 2022-01-20

## 2023-04-26 ENCOUNTER — TELEPHONE (OUTPATIENT)
Dept: PRIMARY CARE CLINIC | Facility: CLINIC | Age: 68
End: 2023-04-26
Payer: MEDICARE

## 2023-04-26 ENCOUNTER — CLINICAL SUPPORT (OUTPATIENT)
Dept: REHABILITATION | Facility: HOSPITAL | Age: 68
End: 2023-04-26
Payer: MEDICARE

## 2023-04-26 DIAGNOSIS — M25.612 DECREASED SHOULDER MOBILITY, LEFT: ICD-10-CM

## 2023-04-26 PROCEDURE — 97535 SELF CARE MNGMENT TRAINING: CPT | Mod: HCNC,PN

## 2023-04-26 PROCEDURE — 97140 MANUAL THERAPY 1/> REGIONS: CPT | Mod: HCNC,PN

## 2023-05-02 ENCOUNTER — CLINICAL SUPPORT (OUTPATIENT)
Dept: REHABILITATION | Facility: HOSPITAL | Age: 68
End: 2023-05-02
Payer: MEDICARE

## 2023-05-02 DIAGNOSIS — M25.612 DECREASED SHOULDER MOBILITY, LEFT: Primary | ICD-10-CM

## 2023-05-02 PROCEDURE — 97140 MANUAL THERAPY 1/> REGIONS: CPT | Mod: PN

## 2023-05-02 PROCEDURE — 97110 THERAPEUTIC EXERCISES: CPT | Mod: PN

## 2023-05-02 PROCEDURE — 97535 SELF CARE MNGMENT TRAINING: CPT | Mod: PN

## 2023-05-02 NOTE — PROGRESS NOTES
Physical Therapy/Lymphedema Daily Treatment Note     Name: Nadia Damon  Clinic Number: [id number]    Therapy Diagnosis:   Encounter Diagnosis   Name Primary?    Decreased shoulder mobility, left Yes        Physician: Kristine Delgado NP    Visit Date: 5/2/2023       Physician Orders: PT Eval and Treat   Medical Diagnosis: chronic left shoulder pain  Evaluation Date: 4/11/2023  Authorization Period Expiration: 4/9/2024  Progress Note due: 5/11/2023  Plan of Care Certification Period: 6/20/2023  Visit #/Visits authorized: 2/ 20    Time In: 0907  Time Out: 0957  Total Billable Time: 50 minutes    Precautions: Standard    Subjective     Pt reports: no complaints. Left arm is feeling and moving better  She was compliant with home exercise program.  Response to previous treatment: good  Functional change: none at this time    Pain: 2/10  Location: left axilla  (6/10 with shoulder flexion/abduction)    Objective     Objective Measures updated at progress report unless specified      CMS Impairment/Limitation/Restriction for FOTO shoulder Survey    Therapist reviewed FOTO scores for Nadia Damon on 4/11/2023.   FOTO documents entered into [employer] - see Media section.    Limitation Score: 66%         Treatment:     Nadia received the following self care and home management x 8 minutes    Called [employer] to find out about insurace for arm sleeve - insurance does not pay for the sleeve. Recommended [employer] sleeve 20-30 mmHg for preventative treatment      Nadia received therapeutic exercises to develop strength, ROM, flexibility, and posture for 22 minutes including:   THERAPEUTIC EXERCISES:  Continue HEP of AROM, stretching, and postural correction.     Intervention Performed Today     Supine overhead flexion x  AAROM   Supine chicken wing/cactus arms x     Open books/windmills x     Modified child's pose x     Cat cow stretch x     Quadruped rock backs x     Child's pose x      Reach and thread the needle x       Shoulder abduction overhead x       Plan for Next Visit: Will receive soft tissue mobilization to left shoulder and updated exercises prn for the left shoulder motion and strengthening for bilateral upper extremities/shoulders        Nadia received the following manual therapy techniques applied for (20) minutes, including:        Intervention Performed Today     Cervical soft tissue mobilization      Upper trap soft tissue mobilization  x    Clavicular, intercostal soft tissue mobilization      Pec muscle soft tissue mobilization/release x    Subscapularis soft tissue mobilization/release x    Latissimus dors muscle soft tissue mobilization  x    Rhomboids soft tissue mobilization      Axillary Web Syndrome release techniques N/a                 Plan for Next Visit: manual therapy as needed         Home Exercises Provided and Patient Education Provided:   See self care section above    Written Home Exercises Provided: yes. Refer to last visit  Exercises were reviewed and Nadia was able to demonstrate them prior to the end of the session.  Nadia demonstrated good  understanding of the education provided.     Assessment     Nadia tolerated manual therapy better today with moderate pressure applied. Her left arm mobility improved after manual therapy. Due to insurance not paying for a sleeve, she will purchase one on [employer]. Her home program was updated today. She is reaching up against gravity with less difficulty    Pt prognosis is Excellent.     Pt will continue to benefit from skilled outpatient physical therapy to address the deficits listed in the problem list box on initial evaluation, provide pt/family education and to maximize pt's level of independence in the home and community environment.     Pt's spiritual, cultural and educational needs considered and pt agreeable to plan of care and goals.     Anticipated barriers to physical therapy: none    Goals:     Short Term goals: 5 weeks  1. Patient will  demonstrate 100% understanding of lymphedema risk reduction practices to include self monitoring for lymphedema. (progressing, not met)  2. Patient will demonstrate independence with Home Exercise program established. (progressing, not met)  3. Pt will increase AROM/PROM in shoulder abduction ROM to 130 degrees on left to improve functional reach, carry, push, pull pain free. (progressing, not met)  4. Pt will increase AROM/PROM in shoulder flexion to 115 degrees on right to improve functional reach, carry, push, pull pain free.(progressing, not met)        Long Term Goals: 10 weeks   1.  Pt will increase AROM/PROM in shoulder flexion to 170 degrees on right to improve functional reach, carry, push, pull pain free. (progressing, not met)  2. Pt will increase strength to 4+/5 in gross UE musculature to improve tolerance to all functional activities pain free. (progressing, not met)  3. Pt will demonstrate full/maximized tissue mobility to increase ROM and promote healthy tissue to be pain free at discharge. (progressing, not met)  4. Pt will report decrease in overall worst pain to 2/10 at discharge. (progressing, not met)  5. Pt will increase AROM/PROM in shoulder abduction ROM to 170 degrees on right to improve functional reach, carry, push, pull pain free. (progressing, not met)  6. Patient will report compliance with walking program 5x week for 20 min each day to improve overall cardiovascular function and decrease cancer related fatigue at discharge. (progressing, not met)        Plan   Outpatient physical therapy 2x week for 10 weeks to include the following:   Manual Therapy, Patient Education, Self Care, Therapeutic Activities, and Therapeutic Exercise.     Plan of care Certification: 4/11/2023 to 6/20/2023.        Cornelia Tian, PT

## 2023-05-03 ENCOUNTER — OFFICE VISIT (OUTPATIENT)
Dept: PRIMARY CARE CLINIC | Facility: CLINIC | Age: 68
End: 2023-05-03
Payer: MEDICARE

## 2023-05-03 VITALS
DIASTOLIC BLOOD PRESSURE: 73 MMHG | WEIGHT: 165.63 LBS | BODY MASS INDEX: 30.48 KG/M2 | HEART RATE: 94 BPM | TEMPERATURE: 99 F | SYSTOLIC BLOOD PRESSURE: 135 MMHG | HEIGHT: 62 IN | OXYGEN SATURATION: 97 %

## 2023-05-03 DIAGNOSIS — D84.821 IMMUNOSUPPRESSION DUE TO DRUG THERAPY: ICD-10-CM

## 2023-05-03 DIAGNOSIS — Z79.899 IMMUNOSUPPRESSION DUE TO DRUG THERAPY: ICD-10-CM

## 2023-05-03 DIAGNOSIS — R05.2 SUBACUTE COUGH: ICD-10-CM

## 2023-05-03 DIAGNOSIS — U07.1 COVID: Primary | ICD-10-CM

## 2023-05-03 DIAGNOSIS — D05.12 DUCTAL CARCINOMA IN SITU (DCIS) OF LEFT BREAST: ICD-10-CM

## 2023-05-03 DIAGNOSIS — C77.3 SECONDARY AND UNSPECIFIED MALIGNANT NEOPLASM OF AXILLA AND UPPER LIMB LYMPH NODES: ICD-10-CM

## 2023-05-03 DIAGNOSIS — Z94.1 HEART TRANSPLANTED: Chronic | ICD-10-CM

## 2023-05-03 LAB
CTP QC/QA: YES
SARS-COV-2 AG RESP QL IA.RAPID: POSITIVE

## 2023-05-03 PROCEDURE — 1159F MED LIST DOCD IN RCRD: CPT | Mod: CPTII,S$GLB,, | Performed by: INTERNAL MEDICINE

## 2023-05-03 PROCEDURE — 87811 SARS-COV-2 COVID19 W/OPTIC: CPT | Mod: QW,S$GLB,, | Performed by: INTERNAL MEDICINE

## 2023-05-03 PROCEDURE — 3075F PR MOST RECENT SYSTOLIC BLOOD PRESS GE 130-139MM HG: ICD-10-PCS | Mod: CPTII,S$GLB,, | Performed by: INTERNAL MEDICINE

## 2023-05-03 PROCEDURE — 1126F PR PAIN SEVERITY QUANTIFIED, NO PAIN PRESENT: ICD-10-PCS | Mod: CPTII,S$GLB,, | Performed by: INTERNAL MEDICINE

## 2023-05-03 PROCEDURE — 99215 OFFICE O/P EST HI 40 MIN: CPT | Mod: S$GLB,,, | Performed by: INTERNAL MEDICINE

## 2023-05-03 PROCEDURE — 3078F DIAST BP <80 MM HG: CPT | Mod: CPTII,S$GLB,, | Performed by: INTERNAL MEDICINE

## 2023-05-03 PROCEDURE — 3075F SYST BP GE 130 - 139MM HG: CPT | Mod: CPTII,S$GLB,, | Performed by: INTERNAL MEDICINE

## 2023-05-03 PROCEDURE — 3078F PR MOST RECENT DIASTOLIC BLOOD PRESSURE < 80 MM HG: ICD-10-PCS | Mod: CPTII,S$GLB,, | Performed by: INTERNAL MEDICINE

## 2023-05-03 PROCEDURE — 1101F PR PT FALLS ASSESS DOC 0-1 FALLS W/OUT INJ PAST YR: ICD-10-PCS | Mod: CPTII,S$GLB,, | Performed by: INTERNAL MEDICINE

## 2023-05-03 PROCEDURE — 99999 PR PBB SHADOW E&M-EST. PATIENT-LVL V: ICD-10-PCS | Mod: PBBFAC,,, | Performed by: INTERNAL MEDICINE

## 2023-05-03 PROCEDURE — 1159F PR MEDICATION LIST DOCUMENTED IN MEDICAL RECORD: ICD-10-PCS | Mod: CPTII,S$GLB,, | Performed by: INTERNAL MEDICINE

## 2023-05-03 PROCEDURE — 99215 PR OFFICE/OUTPT VISIT, EST, LEVL V, 40-54 MIN: ICD-10-PCS | Mod: S$GLB,,, | Performed by: INTERNAL MEDICINE

## 2023-05-03 PROCEDURE — 87811 SARS CORONAVIRUS 2 ANTIGEN POCT, MANUAL READ: ICD-10-PCS | Mod: QW,S$GLB,, | Performed by: INTERNAL MEDICINE

## 2023-05-03 PROCEDURE — 99999 PR PBB SHADOW E&M-EST. PATIENT-LVL V: CPT | Mod: PBBFAC,,, | Performed by: INTERNAL MEDICINE

## 2023-05-03 PROCEDURE — 1101F PT FALLS ASSESS-DOCD LE1/YR: CPT | Mod: CPTII,S$GLB,, | Performed by: INTERNAL MEDICINE

## 2023-05-03 PROCEDURE — 3044F PR MOST RECENT HEMOGLOBIN A1C LEVEL <7.0%: ICD-10-PCS | Mod: CPTII,S$GLB,, | Performed by: INTERNAL MEDICINE

## 2023-05-03 PROCEDURE — 3288F FALL RISK ASSESSMENT DOCD: CPT | Mod: CPTII,S$GLB,, | Performed by: INTERNAL MEDICINE

## 2023-05-03 PROCEDURE — 3008F BODY MASS INDEX DOCD: CPT | Mod: CPTII,S$GLB,, | Performed by: INTERNAL MEDICINE

## 2023-05-03 PROCEDURE — 3288F PR FALLS RISK ASSESSMENT DOCUMENTED: ICD-10-PCS | Mod: CPTII,S$GLB,, | Performed by: INTERNAL MEDICINE

## 2023-05-03 PROCEDURE — 1126F AMNT PAIN NOTED NONE PRSNT: CPT | Mod: CPTII,S$GLB,, | Performed by: INTERNAL MEDICINE

## 2023-05-03 PROCEDURE — 1157F PR ADVANCE CARE PLAN OR EQUIV PRESENT IN MEDICAL RECORD: ICD-10-PCS | Mod: CPTII,S$GLB,, | Performed by: INTERNAL MEDICINE

## 2023-05-03 PROCEDURE — 3044F HG A1C LEVEL LT 7.0%: CPT | Mod: CPTII,S$GLB,, | Performed by: INTERNAL MEDICINE

## 2023-05-03 PROCEDURE — 3008F PR BODY MASS INDEX (BMI) DOCUMENTED: ICD-10-PCS | Mod: CPTII,S$GLB,, | Performed by: INTERNAL MEDICINE

## 2023-05-03 PROCEDURE — 1157F ADVNC CARE PLAN IN RCRD: CPT | Mod: CPTII,S$GLB,, | Performed by: INTERNAL MEDICINE

## 2023-05-03 RX ORDER — ALBUTEROL SULFATE 90 UG/1
2 AEROSOL, METERED RESPIRATORY (INHALATION) EVERY 4 HOURS PRN
Qty: 8 G | Refills: 0 | Status: SHIPPED | OUTPATIENT
Start: 2023-05-03 | End: 2023-06-21 | Stop reason: SDUPTHER

## 2023-05-03 RX ORDER — GUAIFENESIN 600 MG/1
1200 TABLET, EXTENDED RELEASE ORAL
COMMUNITY

## 2023-05-03 NOTE — ASSESSMENT & PLAN NOTE
Symptoms started Friday - out of 5 day window for first line oral antiviral - CKD and other medications additional possible barriers - advised pt to notify close contacts as well as her transplant team and breast cancer team of testing positive for Covid - reviewed CDC recommendations on isolation and quarantine

## 2023-05-03 NOTE — ASSESSMENT & PLAN NOTE
30 year transplant survivor - chronic immunosuppressant medication - advised pt to notify of CoVid +

## 2023-05-03 NOTE — ASSESSMENT & PLAN NOTE
Unfortunately CoVid+ due to exposure prior to would benefit from booster - cont monitor symptoms - advised pt to notify transplant and breast cancer providers of CoVid+ status also

## 2023-05-03 NOTE — PATIENT INSTRUCTIONS
Recommend self-isolate 5 days - stay home - both you and others should wear medical mask if will be in close contact  After 5 days, if no fever and feeling better recommend to continue quarantine an additional 5 days - continue wearing mask when around others an additional 5 days    Ok to cont mucinex tablets Q12 for congestion - take w large glass of water  Dextramethorphan at night may help w cough  Trial albuterol inhaler 2 puffs every 4-6 hrs as needed may help w cough, wheeze    Please let recent close contacts know you've tested positive for CoVid - they may want to get tested    Please let your transplant and breast cancer teams know that you've tested positive for CoVid    Hydrate, sleep, take it easy - let us know if no improvement by Monday or develop fever or increased shortness of breath

## 2023-05-03 NOTE — PROGRESS NOTES
Nadia Damon  05/03/2023  [id number]    Elis Wick MD  Patient Care Team:  Elis Wick MD as PCP - General (Internal Medicine)  Miguel Soni Jr., MD as Consulting Physician (Vascular Surgery)  Ike King MD as Consulting Physician (Cardiology)  Courtney Tubbs MD as Consulting Physician (Cardiology)  Carter Crawford MD as Consulting Physician (Nephrology)  Paxton Vasques OD as Consulting Physician (Optometry)  PRANAY Villalobos MD as Obstetrician (Obstetrics)  Parker Mccarthy IV, MD (Urology)  Ike King MD as Consulting Physician (Cardiology)  Jose Roland MD as Consulting Physician (Dermatology)  Prasanth Johnson MD as Consulting Physician (Rheumatology)  Nora Morin LCSW as   Elis Wick MD as Physician (Internal Medicine)    Visit Type: Follow-up    Chief Complaint:  Chief Complaint   Patient presents with    5 week f/u     Cold symptoms: Cough, scratchy throat, headache     History of Present Illness: Ms. Damon is a 67 year old female followed by Stacey Perrault, [facility], since October 2021. Last seen by me in March.      Ms. Damon w h/o heart transplant 1993, on anti-rejection medication. 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 She was treated with 1 cycle of systemic chemotherapy cytoxan and taxotere and udenyca that was discontinued due to toxicity. She has been followed by radiation oncology and completed last radiation treatment 5/14/22. She is still followed by Heme/Onc for Epo, initiated for anemia due to stage 4 CKD. Other medical issues include depresssion/anxiety/insomnia, hypertension, chronic diastolic CHF, and osteoporois for which she is receiving Prolia.     Went to [place] [facility] [place] on Thursday  Got CoVid Bivalent booster Thursday also  Started w sore throat Friday, headache, bodyache, chills, no fever  Saturday cough,  joints aching trouble even walking went to Urgent Care   - Covid test neg there - has not heard back from them yet on other labwork  Cont headache, sore throat, cough, fatigue, body aches, chills - urinary leakage w cough  No fever, nausea, diarrhea     From 3/28/23  Home 's/80's  10 year anniversary of heart transplant  S/p HELLEN 2/2/23 has helped with pain  Vaginal prolapse, atrophy  Stress incontinence  Urinary frequency  Constipation  Insomnia     Hosp/ED/Urgent Care:  4/29/2023 [facility]  2/02/2023 Pain Med Dr. Pierre T11-T12 [place] [facility]    Recent appointments:   5/02/23 & 4/26 & 4/11 PT L shoulder  4/10/23 Breast Surg Danny  3/28/23 O65+ Wick  3/22/23 Gyn Dr. Griselda WILKINS, vaginal prolapse  3/03/23 Pain Med Dr. Pierre sig relief after T11-T12 inj 02/02/2023 2/13/23 O65+ Luz Dickson, ONELIA   2/10/23 Opthal Layo B cataract  2/08/23 Heme/Onc BARON Burk GUSTAVO epo on hold    Upcoming appointments:  Future Appointments       Next 10 Appointments       Date Provider Specialty Appt Notes    5/5/2023 Cornelia Tian, PT Outpatient Rehab Lymph---No copay    5/9/2023 Cornelia Tian PT Outpatient Rehab Lymph---No copay    5/11/2023 Cornelia Tian PT Outpatient Rehab Lymph---No copay    5/16/2023 Cornelia Tian PT Outpatient Rehab Lymph---No copay    5/16/2023 BARON Daly-C Gastroenterology Constipation, unspecified constipation type     5/18/2023 Cornelia Tian PT Outpatient Rehab Lymph---No copay    5/18/2023 Luz Dickson, NP Primary Care 2 week f/u    5/22/2023  Urology Urodynamics per GYN     5/23/2023 Cornelia Tian PT Outpatient Rehab Lymph--No copay    5/26/2023 Cornelia Tian PT Outpatient Rehab Lymph--No copay              Displaying the next 10 appointments. This patient has additional appointments scheduled.           The following were reviewed: Active problem list, medication list, allergies, family history, social history, and Health Maintenance.      Medications have been reviewed and reconciled with patient at visit today.    Review of Systems   See HPI above    Exam: sat 97%  Vitals:    05/03/23 0956   BP: 135/73   Pulse: 94   Temp: 98.7 °F (37.1 °C)     Weight: 75.1 kg (165 lb 9.6 oz)   Body mass index is 30.29 kg/m².    BP Readings from Last 3 Encounters:   05/03/23 135/73   04/10/23 119/78   03/28/23 129/76      Wt Readings from Last 3 Encounters:   05/03/23 0956 75.1 kg (165 lb 9.6 oz)   04/10/23 1009 77.1 kg (169 lb 15.6 oz)   04/10/23 1049 76 kg (167 lb 7 oz)      Physical Exam  Vitals reviewed.   Constitutional:       Appearance: She is ill-appearing.   HENT:      Head: Normocephalic and atraumatic.      Mouth/Throat:      Mouth: Mucous membranes are moist.      Pharynx: Oropharynx is clear. No oropharyngeal exudate or posterior oropharyngeal erythema.   Eyes:      General:         Right eye: No discharge.         Left eye: No discharge.      Extraocular Movements: Extraocular movements intact.      Conjunctiva/sclera: Conjunctivae normal.      Comments: Light sensitivity   Neck:      Vascular: No carotid bruit.   Cardiovascular:      Rate and Rhythm: Normal rate and regular rhythm.   Pulmonary:      Breath sounds: Wheezing present.      Comments: Wheeze posterior upper lobes L > R  Persistent non-productive cough  Abdominal:      General: Bowel sounds are normal.      Palpations: Abdomen is soft.      Tenderness: There is no abdominal tenderness. There is no guarding.   Musculoskeletal:      Right lower leg: No edema.      Left lower leg: No edema.   Lymphadenopathy:      Cervical: No cervical adenopathy.   Skin:     General: Skin is warm and dry.      Findings: No rash.   Neurological:      General: No focal deficit present.      Mental Status: She is alert.   Psychiatric:         Thought Content: Thought content normal.      Comments: General malaise        Laboratory Reviewed  Lab Results   Component Value Date    WBC 5.67 02/08/2023    HGB 9.3  (L) 02/08/2023    HCT 29.1 (L) 02/08/2023     02/08/2023    MCV 90 02/08/2023    CHOL 193 10/04/2022    TRIG 159 (H) 10/04/2022    HDL 46 10/04/2022    LDLCALC 115.2 10/04/2022    ALT 21 01/04/2023    AST 36 01/04/2023     03/08/2023    K 4.5 03/08/2023     03/08/2023    CREATININE 2.2 (H) 03/08/2023    BUN 30 (H) 03/08/2023    CO2 23 03/08/2023    MG 2.0 09/01/2022    TSH 8.908 (H) 03/08/2023    FREET4 0.93 03/08/2023    PSA <0.1 05/27/2008    INR 1.0 11/22/2021    HGBA1C 5.1 03/08/2023    CRP 19.0 (H) 09/30/2022 5/03/23 POC Covid positive  3/08/23 eGFR 23.8     Assessment:   67 y.o. female with multiple co-morbid illnesses here for continued follow up of medical problems.      The primary encounter diagnosis was COVID. Diagnoses of Cough, Immunosuppression due to drug therapy, Heart transplanted, Secondary and unspecified malignant neoplasm of axilla and upper limb lymph nodes, and Ductal carcinoma in situ (DCIS) of left breast were also pertinent to this visit.      Plan:   1. COVID  Assessment & Plan:  Symptoms started Friday - out of 5 day window for first line oral antiviral - CKD and other medications additional possible barriers - advised pt to notify close contacts as well as her transplant team and breast cancer team of testing positive for Covid - reviewed CDC recommendations on isolation and quarantine      2. Cough  Assessment & Plan:  Symptomatic Tx - mucinex, dextramethorphan, albuterol prn    Orders:  -     COVID-19 Routine Screening  -     SARS Coronavirus 2 Antigen, POCT Manual Read    3. Immunosuppression due to drug therapy  Overview:  On anti rejection drugs    Assessment & Plan:  Unfortunately CoVid+ due to exposure prior to would benefit from booster - cont monitor symptoms - advised pt to notify transplant and breast cancer providers of CoVid+ status also      4. Heart transplanted  Overview:  5/93     Assessment & Plan:  30 year transplant survivor - chronic  immunosuppressant medication - advised pt to notify of CoVid +      5. Secondary and unspecified malignant neoplasm of axilla and upper limb lymph nodes  Assessment & Plan:  Followed by breast surg Danny      6. Ductal carcinoma in situ (DCIS) of left breast  Overview:  Patient has small 2 mm area of microinvasion ERPR  negative    Assessment & Plan:  Cont f/u w breast surg [facility]       Other orders  -     dextromethorphan HBr 5 mg/5 mL Syrp; Take 5 mg by mouth nightly as needed (cough).  Dispense: 354 mL; Refill: 0  -     albuterol (VENTOLIN HFA) 90 mcg/actuation inhaler; Inhale 2 puffs into the lungs every 4 (four) hours as needed for Wheezing or Shortness of Breath (cough). Rescue  Dispense: 8 g; Refill: 0         Health Maintenance         Date Due Completion Date    COVID-19 Vaccine (3 - Moderna risk series) 08/10/2022 7/13/2022    Lipid Panel 10/04/2023 10/4/2022    Pneumococcal Vaccines (Age 65+) (3 - PPSV23 if available, else PCV20) 10/22/2023 10/22/2018    Mammogram 04/10/2024 4/10/2023    DEXA Scan 01/25/2026 1/25/2023    Colorectal Cancer Screening 02/25/2026 2/25/2021    Hemoglobin A1c (Diabetic Prevention Screening) 03/08/2026 3/8/2023    TETANUS VACCINE 09/09/2030 9/9/2020            -Patient's lab results were reviewed and discussed with patient  -Treatment options and alternatives were discussed with the patient. Patient expressed understanding. Patient was given the opportunity to ask questions and be an active participant in their medical care. Patient had no further questions or concerns at this time.   -Patient is an overall moderate to HIGH risk for health complications from their medical conditions.     Follow up: Follow up in about 2 weeks (around 5/17/2023) for Follow Up 2-3 weeks w me or Luz.    After visit summary printed and given to patient upon discharge.  Patient goals and care plan are included in After visit summary.    TOTAL TIME evaluating and managing this patient for this  encounter was greater than 60 minutes. This time was spent personally by me on some of the following activities: review of patient's past medical history, assessing age-appropriate health maintenance needs, review of any interval history, review and interpretation of lab results, review and interpretation of imaging test results, review and interpretation of cardiology test results, reviewing consulting specialist notes, obtaining history from the patient and family, examination of the patient, medication reconciliation, managing and/or ordering prescription medications, ordering imaging tests, ordering referral to subspecialty provider(s), educating patient and answering their questions about diagnosis, treatment plan, and goals of treatment, discussing planned follow-up and final documentation of the visit. This time was exclusive of any separately billable procedures for this patient and exclusive of time spent treating any other patients.

## 2023-05-08 ENCOUNTER — TELEPHONE (OUTPATIENT)
Dept: TRANSPLANT | Facility: CLINIC | Age: 68
End: 2023-05-08
Payer: MEDICARE

## 2023-05-08 DIAGNOSIS — T86.20 COMPLICATION OF HEART TRANSPLANT, UNSPECIFIED COMPLICATION: Primary | ICD-10-CM

## 2023-05-08 DIAGNOSIS — Z79.899 ENCOUNTER FOR LONG-TERM (CURRENT) USE OF MEDICATIONS: ICD-10-CM

## 2023-05-08 DIAGNOSIS — Z94.1 STATUS POST HEART TRANSPLANT: ICD-10-CM

## 2023-05-08 NOTE — TELEPHONE ENCOUNTER
Spoke with pt and she stated that she tested Positive for COVID on 5/3/23    April 27 th she received the Covid booster.  She stated she never had a fever, just thought it was a Cold. She felt bad early on and felt better on day 5.     Denies fever, does have some SOB, she states that she feels better,.explained to her the 20 day isolation and that we will schedule her tests in June instead of May. Pt stated understanding if her sob or fatigue worsens to go to the ER and call us.

## 2023-05-09 ENCOUNTER — TELEPHONE (OUTPATIENT)
Dept: GASTROENTEROLOGY | Facility: CLINIC | Age: 68
End: 2023-05-09
Payer: MEDICARE

## 2023-05-09 NOTE — TELEPHONE ENCOUNTER
Returned pts call, no answer. Msg left.   Shows pt has the [facility] portal but not used since 09/23/2022.

## 2023-05-09 NOTE — TELEPHONE ENCOUNTER
----- Message from Dee Stein sent at 5/9/2023 10:43 AM CDT -----  Contact: Nadia  Nadia needs a call back at [phone], Regards to getting her appointment reschedule as a virtual visit or another day due to her recent diagnose of Covid.    Thanks  Td

## 2023-05-09 NOTE — TELEPHONE ENCOUNTER
----- Message from Anne Canales sent at 5/9/2023  4:30 PM CDT -----  Contact: Nadia  .Type:  Patient Returning Call    Who Called:Nadia  Who Left Message for Patient:nurse  Does the patient know what this is regarding?:yes  Would the patient rather a call back or a response via [facility]? Call back  Best Call Back Number:[phone]  Additional Information: na    Thanks  MONA

## 2023-05-09 NOTE — TELEPHONE ENCOUNTER
Returned pts call. She states she has covid and wanted to reschedule her appt for next week with Ms Mooney. Next available appt is not until July 2023.  She states she will call back Monday to let us know how she is feeling. May just keep the appt.

## 2023-05-16 ENCOUNTER — OFFICE VISIT (OUTPATIENT)
Dept: GASTROENTEROLOGY | Facility: CLINIC | Age: 68
End: 2023-05-16
Payer: MEDICARE

## 2023-05-16 VITALS
BODY MASS INDEX: 31.38 KG/M2 | HEIGHT: 62 IN | DIASTOLIC BLOOD PRESSURE: 86 MMHG | SYSTOLIC BLOOD PRESSURE: 140 MMHG | HEART RATE: 97 BPM | WEIGHT: 170.5 LBS

## 2023-05-16 DIAGNOSIS — K59.00 CONSTIPATION, UNSPECIFIED CONSTIPATION TYPE: ICD-10-CM

## 2023-05-16 PROCEDURE — 3044F HG A1C LEVEL LT 7.0%: CPT | Mod: CPTII,,, | Performed by: PHYSICIAN ASSISTANT

## 2023-05-16 PROCEDURE — 99213 OFFICE O/P EST LOW 20 MIN: CPT | Mod: ,,, | Performed by: PHYSICIAN ASSISTANT

## 2023-05-16 PROCEDURE — 1101F PR PT FALLS ASSESS DOC 0-1 FALLS W/OUT INJ PAST YR: ICD-10-PCS | Mod: CPTII,,, | Performed by: PHYSICIAN ASSISTANT

## 2023-05-16 PROCEDURE — 1157F ADVNC CARE PLAN IN RCRD: CPT | Mod: CPTII,,, | Performed by: PHYSICIAN ASSISTANT

## 2023-05-16 PROCEDURE — 1157F PR ADVANCE CARE PLAN OR EQUIV PRESENT IN MEDICAL RECORD: ICD-10-PCS | Mod: CPTII,,, | Performed by: PHYSICIAN ASSISTANT

## 2023-05-16 PROCEDURE — 1101F PT FALLS ASSESS-DOCD LE1/YR: CPT | Mod: CPTII,,, | Performed by: PHYSICIAN ASSISTANT

## 2023-05-16 PROCEDURE — 3079F DIAST BP 80-89 MM HG: CPT | Mod: CPTII,,, | Performed by: PHYSICIAN ASSISTANT

## 2023-05-16 PROCEDURE — 3077F PR MOST RECENT SYSTOLIC BLOOD PRESSURE >= 140 MM HG: ICD-10-PCS | Mod: CPTII,,, | Performed by: PHYSICIAN ASSISTANT

## 2023-05-16 PROCEDURE — 3288F PR FALLS RISK ASSESSMENT DOCUMENTED: ICD-10-PCS | Mod: CPTII,,, | Performed by: PHYSICIAN ASSISTANT

## 2023-05-16 PROCEDURE — 3044F PR MOST RECENT HEMOGLOBIN A1C LEVEL <7.0%: ICD-10-PCS | Mod: CPTII,,, | Performed by: PHYSICIAN ASSISTANT

## 2023-05-16 PROCEDURE — 99213 PR OFFICE/OUTPT VISIT, EST, LEVL III, 20-29 MIN: ICD-10-PCS | Mod: ,,, | Performed by: PHYSICIAN ASSISTANT

## 2023-05-16 PROCEDURE — 3288F FALL RISK ASSESSMENT DOCD: CPT | Mod: CPTII,,, | Performed by: PHYSICIAN ASSISTANT

## 2023-05-16 PROCEDURE — 3008F PR BODY MASS INDEX (BMI) DOCUMENTED: ICD-10-PCS | Mod: CPTII,,, | Performed by: PHYSICIAN ASSISTANT

## 2023-05-16 PROCEDURE — 3079F PR MOST RECENT DIASTOLIC BLOOD PRESSURE 80-89 MM HG: ICD-10-PCS | Mod: CPTII,,, | Performed by: PHYSICIAN ASSISTANT

## 2023-05-16 PROCEDURE — 99215 OFFICE O/P EST HI 40 MIN: CPT | Performed by: PHYSICIAN ASSISTANT

## 2023-05-16 PROCEDURE — 99999 PR PBB SHADOW E&M-EST. PATIENT-LVL V: ICD-10-PCS | Mod: PBBFAC,,, | Performed by: PHYSICIAN ASSISTANT

## 2023-05-16 PROCEDURE — 1126F AMNT PAIN NOTED NONE PRSNT: CPT | Mod: CPTII,,, | Performed by: PHYSICIAN ASSISTANT

## 2023-05-16 PROCEDURE — 3077F SYST BP >= 140 MM HG: CPT | Mod: CPTII,,, | Performed by: PHYSICIAN ASSISTANT

## 2023-05-16 PROCEDURE — 99999 PR PBB SHADOW E&M-EST. PATIENT-LVL V: CPT | Mod: PBBFAC,,, | Performed by: PHYSICIAN ASSISTANT

## 2023-05-16 PROCEDURE — 1126F PR PAIN SEVERITY QUANTIFIED, NO PAIN PRESENT: ICD-10-PCS | Mod: CPTII,,, | Performed by: PHYSICIAN ASSISTANT

## 2023-05-16 PROCEDURE — 3008F BODY MASS INDEX DOCD: CPT | Mod: CPTII,,, | Performed by: PHYSICIAN ASSISTANT

## 2023-05-16 NOTE — PROGRESS NOTES
Clinic Consult:  [facility] Consultation Note    Reason for Consult:  The encounter diagnosis was Constipation, unspecified constipation type.    PCP: Elis Wick   [id number] [facility] [address] / [place] [address]    CC: Constipation      HPI:  This is a 67 y.o. female here for evaluation of the above. Reports chronic constipation over the past 30 years. Takes Gas X regularly due to bloating. Often passes pellet stools. Takes Metamucil and Dulcolax to get a bowel movement. Appetite somewhat decreased.   Cancer about 1 yr ago - tried chemo but difficulty tolerating. Had radiation and lumpectomy. Also did LN removal.  Currently cancer free - no chemo or radiation currently.   Colonoscopy 2021 - 5 yr recall.  Cystocele - working with gyn and urology. Hystrectomy 1983.  Takes medication over the counter to have a BM about every 3rd day.   More discomfort to the right flank when backed up. Improves with bowel movement.     Review of Systems   Constitutional:  Positive for appetite change. Negative for activity change, chills, diaphoresis, fatigue and fever.   Respiratory:  Negative for shortness of breath.    Cardiovascular:  Negative for chest pain.   Gastrointestinal:  Positive for abdominal distention (bloating), abdominal pain, constipation and nausea. Negative for anal bleeding, blood in stool, diarrhea and vomiting.   Genitourinary:         Followed by urology and gynecology   Neurological:  Negative for dizziness and weakness.   Psychiatric/Behavioral:  Negative for dysphoric mood. The patient is not nervous/anxious.       Medical History:   Past Medical History:   Diagnosis Date    Abdominal wall hernia     CT Renal 6/11/2018---Small fat containing superior ventral abdominal wall hernia at the epicardial pacing lead site.    Abnormal mammogram 10/12/2021    GRACE (acute kidney injury) 11/22/2021    Anxiety     Arthritis     ZEN HIPS    Breast cancer in female 08/2021    LEFT BREAST    C.  difficile colitis 11/29/2021    Cellulitis of axilla, left 12/23/2021    Chronic diastolic heart failure 12/16/2021    Chronic kidney disease     stage 4, GFR 15-29 ml/min    Chronic midline low back pain without sciatica 06/18/2018    Closed nondisplaced fracture of distal phalanx of left great toe with routine healing 10/22/2018    Coronary artery disease 1993    heart transplant    Cystitis 5/10/2022    Depression     Fibromyalgia     on Lyrica    Heart failure     native heart cardiomyopathy    Heart transplanted 1993    due to cardiomyopathy    History of hyperparathyroidism; Hyperparathyroidism, secondary renal     PT DENIES    Hypertension     Immune disorder     anti rejection meds    Iron deficiency anemia 08/15/2017    Kidney stones     passed per pt    Obesity     Other osteoporosis without current pathological fracture 08/30/2019    Severe sepsis 11/22/2021    Shingles 2003 approx    left leg    Trouble in sleeping     Urinary incontinence        Surgical History:   Past Surgical History:   Procedure Laterality Date    BLADDER SURGERY  2015 approx    mesh - Dr Everett then 2nd reconstructive sx Dr Onofre    BREAST BIOPSY Bilateral     NEGATIVE    BREAST BIOPSY Right 10/31/2022    benign    BREAST LUMPECTOMY Left 2021    BREAST SURGERY Left 09/28/2015    Bx - benign    BREAST SURGERY Right 12/2015    Bx benign    CARDIAC PACEMAKER REMOVAL Left 06/26/2014    Pacer defirillator removed. Put in 1993 aat time of heart transplant    CARPAL TUNNEL RELEASE Left 03/03/2015    Dr. Hall    COLONOSCOPY N/A 02/25/2021    Procedure: COLONOSCOPY;  Surgeon: Freida Ramirez MD;  Location: [facility];  Service: Endoscopy;  Laterality: N/A;    CYSTOCELE REPAIR      Twice with mesh removal    EPIDURAL STEROID INJECTION INTO CERVICAL SPINE N/A 02/02/2023    Procedure: T11/T12 IL HELLEN;  Surgeon: Jassi Pierre MD;  [facility];  Service: Pain Management;  Laterality: N/A;    HEART TRANSPLANT  1993    HERNIA  REPAIR Right 1971 approx    Inguinal    HYSTERECTOMY  1983    vag hyst /LSO     INCISION AND DRAINAGE OF ABSCESS Left 12/24/2021    Procedure: INCISION AND DRAINAGE, ABSCESS;  Surgeon: Joseph Longo MD;  Location: [facility] OR;  Service: General;  Laterality: Left;    INJECTION OF ANESTHETIC AGENT AROUND MEDIAL BRANCH NERVES INNERVATING LUMBAR FACET JOINT Right 10/19/2022    Procedure: Right L4/L5 and L5/S1 MBB;  Surgeon: Jassi Pierre MD;  Location: [facility];  Service: Pain Management;  Laterality: Right;    INJECTION OF ANESTHETIC AGENT AROUND MEDIAL BRANCH NERVES INNERVATING LUMBAR FACET JOINT Right 11/09/2022    Procedure: Right L4/L5 and L5/S1 MBB;  Surgeon: Jassi Pierre MD;  Location: [facility];  Service: Pain Management;  Laterality: Right;    INJECTION OF ANESTHETIC AGENT INTO SACROILIAC JOINT Right 08/22/2022    Procedure: Right SIJ Injection Right L5/S1 Facte Injection;  Surgeon: Jassi Pierre MD;  Location: [facility];  Service: Pain Management;  Laterality: Right;    INSERTION OF TUNNELED CENTRAL VENOUS CATHETER (CVC) WITH SUBCUTANEOUS PORT N/A 11/09/2021    Procedure: [id number];  Surgeon: Christoph Douglas MD;  Location: [facility] OR;  Service: General;  Laterality: N/A;    RADIOFREQUENCY THERMOCOAGULATION Right 12/07/2022    Procedure: Right L4/L5 and L5/S1 Lumbar RFA;  Surgeon: Jassi Pierre MD;  Location: [facility];  Service: Pain Management;  Laterality: Right;    REMOVAL OF VASCULAR ACCESS PORT      SENTINEL LYMPH NODE BIOPSY Left 10/12/2021    Procedure: BIOPSY, LYMPH NODE, SENTINEL;  Surgeon: Christoph Douglas MD;  Location: [facility] OR;  Service: General;  Laterality: Left;    TOE SURGERY         Family History:    Family History   Problem Relation Age of Onset    Cancer Mother 38        breast    Breast cancer Mother     Breast cancer Maternal Grandmother     Heart disease Maternal Grandmother     Hypertension Son     Cataracts Cousin     Diabetes Neg Hx     Stroke  Neg Hx     Kidney disease Neg Hx     Asthma Neg Hx     COPD Neg Hx     Melanoma Neg Hx     Hyperlipidemia Neg Hx        Social History:   Social History     Socioeconomic History    Marital status: Single    Number of children: 2    Highest education level: 11th grade   Occupational History    Occupation: Retired   Tobacco Use    Smoking status: Never    Smokeless tobacco: Never   Substance and Sexual Activity    Alcohol use: Never     Alcohol/week: 0.0 standard drinks    Drug use: No    Sexual activity: Not Currently     Partners: Male     Birth control/protection: See Surgical Hx   Other Topics Concern    Are you pregnant or think you may be? No    Breast-feeding No   Social History Narrative    Single. 2 children , 1  at  yo2014 strep throat -  pneumonia and renal complications after not completing course of AB. Other child lives in [place], [place]. Has a cousin locally that could help in an emergency. 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 Caffeine intake =- 1 cola a day. No coffee, + occasional tea, avoids caffeine especially at night. Still drives. She does not have a Living Will or Advanced directive.        Allergies:   Review of patient's allergies indicates:   Allergen Reactions    Lisinopril Swelling and Rash    Augmentin [amoxicillin-pot clavulanate] Diarrhea       Home Medications:   Current Outpatient Medications on File Prior to Visit   Medication Sig Dispense Refill    amLODIPine (NORVASC) 2.5 MG tablet Take 1 tablet (2.5 mg total) by mouth once daily. 90 tablet 3    aspirin (ECOTRIN) 81 MG EC tablet Take 1 tablet (81 mg total) by mouth once daily. 90 tablet 3    biotin 10,000 mcg Cap Take 1 tablet by mouth once daily.      busPIRone (BUSPAR) 10 MG tablet TAKE 1 TABLET EVERY DAY 90 tablet 3    calcitonin, salmon, (FORTICAL) 200 unit/actuation nasal spray 1 spray by Nasal route once daily. 3 each 3    carvediloL (COREG) 6.25 MG tablet Take 1 tablet (6.25 mg  "total) by mouth 2 (two) times daily with meals. 180 tablet 3    furosemide (LASIX) 20 MG tablet Take 1 tablet (20 mg total) by mouth once daily. 90 tablet 1    hydrALAZINE (APRESOLINE) 50 MG tablet Take 1 tablet (50 mg total) by mouth every 8 (eight) hours. TAKE 1 TABLETS  EVERY 8  HOURS. 270 tablet 3    LIDOcaine (LIDODERM) 5 % Place 2 patches onto the skin once daily. Remove & Discard patch within 12 hours or as directed by MD 60 patch 2    multivitamin capsule Take 1 capsule by mouth once daily.      pantoprazole (PROTONIX) 40 MG tablet TAKE 1 TABLET EVERY DAY 90 tablet 1    polyethylene glycol (GLYCOLAX) 17 gram/dose powder Take 17 g by mouth once daily.      pregabalin (LYRICA) 50 MG capsule Take 1 capsule (50 mg total) by mouth 2 (two) times daily. NOON & NIGHT TIME 180 capsule 1    UNABLE TO FIND medication name: Stool softener (Ducolax) Laxative      vitamin E 400 UNIT capsule Take 400 Units by mouth once daily.      zolpidem (AMBIEN) 10 mg Tab TAKE 1 TABLET EVERY EVENING 90 tablet 0    albuterol (VENTOLIN HFA) 90 mcg/actuation inhaler Inhale 2 puffs into the lungs every 4 (four) hours as needed for Wheezing or Shortness of Breath (cough). Rescue 8 g 0    cycloSPORINE modified, NEORAL, (NEORAL) 25 MG capsule TAKE 3 CAPSULES (75 MG TOTAL) BY MOUTH 2 (TWO) TIMES DAILY. 540 capsule 6    EVENING PRIMROSE OIL ORAL Take 1,000 mg by mouth once daily.      guaiFENesin (MUCINEX) 600 mg 12 hr tablet Take 1,200 mg by mouth 2 (two) times daily.      psyllium husk (METAMUCIL ORAL) Take by mouth.       No current facility-administered medications on file prior to visit.       Physical Exam:  BP (!) 140/86 (BP Location: Left arm, Patient Position: Sitting, BP Method: Medium (Manual))   Pulse 97   Ht 5' 2" (1.575 m)   Wt 77.3 kg (170 lb 8.4 oz)   LMP 06/20/1983 (Within Years)   BMI 31.19 kg/m²   Body mass index is 31.19 kg/m².  Physical Exam      Labs: Pertinent labs reviewed.  Endoscopy: --  CRC Screening: " 2021  Anticoagulation: Aspirin 81mg    Assessment:  1. Constipation, unspecified constipation type         Recommendations:  -Patient with long standing history of constipation. Controlled with stool softener and Miralax which she only takes as needed. Recommend trying Miralax 1-2 daily on a regular basis.   -Also recommend pelvic floor therapy. Patient very agreeable to PFT.  -If this is not beneficial, can consider a trial of Linzess, however I would prefer trying more conservative management first given polypharmacy.    Constipation, unspecified constipation type  -     Ambulatory referral/consult to Gastroenterology  -     Ambulatory referral/consult to Physical/Occupational Therapy; Future; Expected date: 05/23/2023        Follow up in about 3 months (around 8/16/2023).    Thank you so much for allowing me to participate in the care of Nadia Brennan PA-C

## 2023-05-18 ENCOUNTER — OFFICE VISIT (OUTPATIENT)
Dept: PRIMARY CARE CLINIC | Facility: CLINIC | Age: 68
End: 2023-05-18
Payer: MEDICARE

## 2023-05-18 VITALS
BODY MASS INDEX: 30.82 KG/M2 | OXYGEN SATURATION: 100 % | TEMPERATURE: 98 F | HEART RATE: 85 BPM | WEIGHT: 167.5 LBS | HEIGHT: 62 IN | DIASTOLIC BLOOD PRESSURE: 70 MMHG | SYSTOLIC BLOOD PRESSURE: 131 MMHG

## 2023-05-18 DIAGNOSIS — Z94.1 HEART TRANSPLANTED: ICD-10-CM

## 2023-05-18 DIAGNOSIS — U07.1 COVID: ICD-10-CM

## 2023-05-18 DIAGNOSIS — U07.1 COVID-19: Primary | ICD-10-CM

## 2023-05-18 DIAGNOSIS — E03.9 ACQUIRED HYPOTHYROIDISM: ICD-10-CM

## 2023-05-18 DIAGNOSIS — N18.4 ANEMIA ASSOCIATED WITH STAGE 4 CHRONIC RENAL FAILURE: Chronic | ICD-10-CM

## 2023-05-18 DIAGNOSIS — D63.1 ANEMIA ASSOCIATED WITH STAGE 4 CHRONIC RENAL FAILURE: Chronic | ICD-10-CM

## 2023-05-18 DIAGNOSIS — R06.09 DOE (DYSPNEA ON EXERTION): ICD-10-CM

## 2023-05-18 DIAGNOSIS — F33.9 CHRONIC MAJOR DEPRESSIVE DISORDER, RECURRENT EPISODE: ICD-10-CM

## 2023-05-18 DIAGNOSIS — G47.00 INSOMNIA, UNSPECIFIED TYPE: ICD-10-CM

## 2023-05-18 DIAGNOSIS — E21.3 HYPERPARATHYROIDISM: ICD-10-CM

## 2023-05-18 DIAGNOSIS — Z79.899 IMMUNOSUPPRESSION DUE TO DRUG THERAPY: Chronic | ICD-10-CM

## 2023-05-18 DIAGNOSIS — D84.821 IMMUNOSUPPRESSION DUE TO DRUG THERAPY: Chronic | ICD-10-CM

## 2023-05-18 DIAGNOSIS — D50.9 IRON DEFICIENCY ANEMIA, UNSPECIFIED IRON DEFICIENCY ANEMIA TYPE: ICD-10-CM

## 2023-05-18 LAB
CTP QC/QA: YES
SARS-COV-2 AG RESP QL IA.RAPID: NEGATIVE

## 2023-05-18 PROCEDURE — 1126F AMNT PAIN NOTED NONE PRSNT: CPT | Mod: CPTII,,, | Performed by: NURSE PRACTITIONER

## 2023-05-18 PROCEDURE — 99999 PR PBB SHADOW E&M-EST. PATIENT-LVL V: ICD-10-PCS | Mod: PBBFAC,,, | Performed by: NURSE PRACTITIONER

## 2023-05-18 PROCEDURE — 3075F PR MOST RECENT SYSTOLIC BLOOD PRESS GE 130-139MM HG: ICD-10-PCS | Mod: CPTII,,, | Performed by: NURSE PRACTITIONER

## 2023-05-18 PROCEDURE — 3044F PR MOST RECENT HEMOGLOBIN A1C LEVEL <7.0%: ICD-10-PCS | Mod: CPTII,,, | Performed by: NURSE PRACTITIONER

## 2023-05-18 PROCEDURE — 87811 SARS-COV-2 COVID19 W/OPTIC: CPT | Mod: QW,,, | Performed by: NURSE PRACTITIONER

## 2023-05-18 PROCEDURE — 1159F PR MEDICATION LIST DOCUMENTED IN MEDICAL RECORD: ICD-10-PCS | Mod: CPTII,,, | Performed by: NURSE PRACTITIONER

## 2023-05-18 PROCEDURE — 1157F ADVNC CARE PLAN IN RCRD: CPT | Mod: CPTII,,, | Performed by: NURSE PRACTITIONER

## 2023-05-18 PROCEDURE — 1159F MED LIST DOCD IN RCRD: CPT | Mod: CPTII,,, | Performed by: NURSE PRACTITIONER

## 2023-05-18 PROCEDURE — 1126F PR PAIN SEVERITY QUANTIFIED, NO PAIN PRESENT: ICD-10-PCS | Mod: CPTII,,, | Performed by: NURSE PRACTITIONER

## 2023-05-18 PROCEDURE — 1101F PT FALLS ASSESS-DOCD LE1/YR: CPT | Mod: CPTII,,, | Performed by: NURSE PRACTITIONER

## 2023-05-18 PROCEDURE — 3075F SYST BP GE 130 - 139MM HG: CPT | Mod: CPTII,,, | Performed by: NURSE PRACTITIONER

## 2023-05-18 PROCEDURE — 3044F HG A1C LEVEL LT 7.0%: CPT | Mod: CPTII,,, | Performed by: NURSE PRACTITIONER

## 2023-05-18 PROCEDURE — 1101F PR PT FALLS ASSESS DOC 0-1 FALLS W/OUT INJ PAST YR: ICD-10-PCS | Mod: CPTII,,, | Performed by: NURSE PRACTITIONER

## 2023-05-18 PROCEDURE — 99999 PR PBB SHADOW E&M-EST. PATIENT-LVL V: CPT | Mod: PBBFAC,,, | Performed by: NURSE PRACTITIONER

## 2023-05-18 PROCEDURE — 99215 OFFICE O/P EST HI 40 MIN: CPT | Mod: PN | Performed by: NURSE PRACTITIONER

## 2023-05-18 PROCEDURE — 3078F DIAST BP <80 MM HG: CPT | Mod: CPTII,,, | Performed by: NURSE PRACTITIONER

## 2023-05-18 PROCEDURE — 3008F PR BODY MASS INDEX (BMI) DOCUMENTED: ICD-10-PCS | Mod: CPTII,,, | Performed by: NURSE PRACTITIONER

## 2023-05-18 PROCEDURE — 1160F RVW MEDS BY RX/DR IN RCRD: CPT | Mod: CPTII,,, | Performed by: NURSE PRACTITIONER

## 2023-05-18 PROCEDURE — 1157F PR ADVANCE CARE PLAN OR EQUIV PRESENT IN MEDICAL RECORD: ICD-10-PCS | Mod: CPTII,,, | Performed by: NURSE PRACTITIONER

## 2023-05-18 PROCEDURE — 3078F PR MOST RECENT DIASTOLIC BLOOD PRESSURE < 80 MM HG: ICD-10-PCS | Mod: CPTII,,, | Performed by: NURSE PRACTITIONER

## 2023-05-18 PROCEDURE — 3288F FALL RISK ASSESSMENT DOCD: CPT | Mod: CPTII,,, | Performed by: NURSE PRACTITIONER

## 2023-05-18 PROCEDURE — 99214 PR OFFICE/OUTPT VISIT, EST, LEVL IV, 30-39 MIN: ICD-10-PCS | Mod: ,,, | Performed by: NURSE PRACTITIONER

## 2023-05-18 PROCEDURE — 1160F PR REVIEW ALL MEDS BY PRESCRIBER/CLIN PHARMACIST DOCUMENTED: ICD-10-PCS | Mod: CPTII,,, | Performed by: NURSE PRACTITIONER

## 2023-05-18 PROCEDURE — 3008F BODY MASS INDEX DOCD: CPT | Mod: CPTII,,, | Performed by: NURSE PRACTITIONER

## 2023-05-18 PROCEDURE — 3288F PR FALLS RISK ASSESSMENT DOCUMENTED: ICD-10-PCS | Mod: CPTII,,, | Performed by: NURSE PRACTITIONER

## 2023-05-18 PROCEDURE — 99214 OFFICE O/P EST MOD 30 MIN: CPT | Mod: ,,, | Performed by: NURSE PRACTITIONER

## 2023-05-18 PROCEDURE — 87811 SARS CORONAVIRUS 2 ANTIGEN POCT, MANUAL READ: ICD-10-PCS | Mod: QW,,, | Performed by: NURSE PRACTITIONER

## 2023-05-18 RX ORDER — TEMAZEPAM 30 MG/1
CAPSULE ORAL
Qty: 30 CAPSULE | Refills: 5 | Status: SHIPPED | OUTPATIENT
Start: 2023-05-18 | End: 2023-09-19 | Stop reason: SDUPTHER

## 2023-05-18 NOTE — ASSESSMENT & PLAN NOTE
Lab Results   Component Value Date    .8 (H) 01/03/2023    CALCIUM 9.1 03/08/2023    CAION 1.13 09/05/2018    PHOS 4.8 (H) 03/08/2023   Monitor.

## 2023-05-18 NOTE — PROGRESS NOTES
Nadia Damon  05/18/2023  [id number]    Elis Wick MD  Patient Care Team:  Elis Wick MD as PCP - General (Internal Medicine)  Miguel Soni Jr., MD as Consulting Physician (Vascular Surgery)  Ike King MD as Consulting Physician (Cardiology)  Courtney Tubbs MD as Consulting Physician (Cardiology)  Carter Crawford MD as Consulting Physician (Nephrology)  Paxton Vasques OD as Consulting Physician (Optometry)  PRANAY Villalobos MD as Obstetrician (Obstetrics)  Parker Mccarthy IV, MD (Urology)  Ike King MD as Consulting Physician (Cardiology)  Jose Roland MD as Consulting Physician (Dermatology)  Prasanth Johnson MD as Consulting Physician (Rheumatology)  Nora Morin LCSW as   Elis Wick MD as Physician (Internal Medicine)      [facility] Primary Care Note      Chief Complaint:  Chief Complaint   Patient presents with    2 week f/u     Fatigue    No energy       History of Present Illness:  HPI    F/U COVID infection. COVID positive 5/18/23. Tx with albuterol HFA, tussin DM.   PT for lymphedema. Transplant clinic recommends 20 day isolation and COVID retest. Self-discontinued albuterol last week. Felt like she was taking too many medications. Now increased DONOHUE noted. Other sx are improving, less cough, less myalgias. Productive cough at times with clear mucous. Fatigue persists. No fever.     Has appt with urology for cystocele this month.           Review of Systems   Constitutional:  Positive for chills and malaise/fatigue. Negative for fever.   Respiratory:  Positive for cough, sputum production and shortness of breath. Negative for hemoptysis and wheezing.    Cardiovascular:  Negative for chest pain and palpitations.   Gastrointestinal:  Negative for abdominal pain, constipation, diarrhea, nausea and vomiting.   Genitourinary:  Negative for dysuria.   Musculoskeletal:  Negative for falls.       The following were reviewed: Active problem list,  medication list, allergies, family history, social history, and Health Maintenance.     History:  Past Medical History:   Diagnosis Date    Abdominal wall hernia     CT Renal 6/11/2018---Small fat containing superior ventral abdominal wall hernia at the epicardial pacing lead site.    Abnormal mammogram 10/12/2021    GRACE (acute kidney injury) 11/22/2021    Anxiety     Arthritis     ZEN HIPS    Breast cancer in female 08/2021    LEFT BREAST    C. difficile colitis 11/29/2021    Cellulitis of axilla, left 12/23/2021    Chronic diastolic heart failure 12/16/2021    Chronic kidney disease     stage 4, GFR 15-29 ml/min    Chronic midline low back pain without sciatica 06/18/2018    Closed nondisplaced fracture of distal phalanx of left great toe with routine healing 10/22/2018    Coronary artery disease 1993    heart transplant    Cystitis 5/10/2022    Depression     Fibromyalgia     on Lyrica    Heart failure     native heart cardiomyopathy    Heart transplanted 1993    due to cardiomyopathy    History of hyperparathyroidism; Hyperparathyroidism, secondary renal     PT DENIES    Hypertension     Immune disorder     anti rejection meds    Iron deficiency anemia 08/15/2017    Kidney stones     passed per pt    Obesity     Other osteoporosis without current pathological fracture 08/30/2019    Severe sepsis 11/22/2021    Shingles 2003 approx    left leg    Trouble in sleeping     Urinary incontinence      Past Surgical History:   Procedure Laterality Date    BLADDER SURGERY  2015 approx    mesh - Dr Everett then 2nd reconstructive sx Dr Onofre    BREAST BIOPSY Bilateral     NEGATIVE    BREAST BIOPSY Right 10/31/2022    benign    BREAST LUMPECTOMY Left 2021    BREAST SURGERY Left 09/28/2015    Bx - benign    BREAST SURGERY Right 12/2015    Bx benign    CARDIAC PACEMAKER REMOVAL Left 06/26/2014    Pacer defirillator removed. Put in 1993 aat time of heart transplant    CARPAL TUNNEL RELEASE Left 03/03/2015    Dr. Hall     COLONOSCOPY N/A 02/25/2021    Procedure: COLONOSCOPY;  Surgeon: Freida Ramirez MD;  Location: [facility] ENDO;  Service: Endoscopy;  Laterality: N/A;    CYSTOCELE REPAIR      Twice with mesh removal    EPIDURAL STEROID INJECTION INTO CERVICAL SPINE N/A 02/02/2023    Procedure: T11/T12 IL HELLEN;  Surgeon: Jassi Pierre MD;  Location: [facility] PAIN MGT;  Service: Pain Management;  Laterality: N/A;    HEART TRANSPLANT  1993    HERNIA REPAIR Right 1971 approx    Inguinal    HYSTERECTOMY  1983    vag hyst /LSO     INCISION AND DRAINAGE OF ABSCESS Left 12/24/2021    Procedure: INCISION AND DRAINAGE, ABSCESS;  Surgeon: Joseph Longo MD;  Location: [facility] OR;  Service: General;  Laterality: Left;    INJECTION OF ANESTHETIC AGENT AROUND MEDIAL BRANCH NERVES INNERVATING LUMBAR FACET JOINT Right 10/19/2022    Procedure: Right L4/L5 and L5/S1 MBB;  Surgeon: Jassi Pierre MD;  Location: [facility] PAIN MGT;  Service: Pain Management;  Laterality: Right;    INJECTION OF ANESTHETIC AGENT AROUND MEDIAL BRANCH NERVES INNERVATING LUMBAR FACET JOINT Right 11/09/2022    Procedure: Right L4/L5 and L5/S1 MBB;  Surgeon: Jassi Pierre MD;  Location: [facility] PAIN MGT;  Service: Pain Management;  Laterality: Right;    INJECTION OF ANESTHETIC AGENT INTO SACROILIAC JOINT Right 08/22/2022    Procedure: Right SIJ Injection Right L5/S1 Facte Injection;  Surgeon: Jassi Pierre MD;  Location: [facility] PAIN MGT;  Service: Pain Management;  Laterality: Right;    INSERTION OF TUNNELED CENTRAL VENOUS CATHETER (CVC) WITH SUBCUTANEOUS PORT N/A 11/09/2021    Procedure: [id number];  Surgeon: Christoph Douglas MD;  Location: [facility] OR;  Service: General;  Laterality: N/A;    RADIOFREQUENCY THERMOCOAGULATION Right 12/07/2022    Procedure: Right L4/L5 and L5/S1 Lumbar RFA;  Surgeon: Jassi Pierre MD;  Location: [facility] PAIN MGT;  Service: Pain Management;  Laterality: Right;    REMOVAL OF VASCULAR ACCESS PORT      SENTINEL LYMPH NODE BIOPSY Left 10/12/2021     Procedure: BIOPSY, LYMPH NODE, SENTINEL;  Surgeon: Christoph Douglas MD;  Location: [facility];  Service: General;  Laterality: Left;    TOE SURGERY       Family History   Problem Relation Age of Onset    Cancer Mother 38        breast    Breast cancer Mother     Breast cancer Maternal Grandmother     Heart disease Maternal Grandmother     Hypertension Son     Cataracts Cousin     Diabetes Neg Hx     Stroke Neg Hx     Kidney disease Neg Hx     Asthma Neg Hx     COPD Neg Hx     Melanoma Neg Hx     Hyperlipidemia Neg Hx      Social History     Socioeconomic History    Marital status: Single    Number of children: 2    Highest education level: 11th grade   Occupational History    Occupation: Retired   Tobacco Use    Smoking status: Never    Smokeless tobacco: Never   Substance and Sexual Activity    Alcohol use: Never     Alcohol/week: 0.0 standard drinks    Drug use: No    Sexual activity: Not Currently     Partners: Male     Birth control/protection: See Surgical Hx   Other Topics Concern    Are you pregnant or think you may be? No    Breast-feeding No   Social History Narrative    Single. 2 children , 1  at 30 yoa   strep throat -  pneumonia and renal complications after not completing course of AB. Other child lives in [place], [place]. Has a cousin locally that could help in an emergency. 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 Caffeine intake =- 1 cola a day. No coffee, + occasional tea, avoids caffeine especially at night. Still drives. She does not have a Living Will or Advanced directive.      Patient Active Problem List   Diagnosis    Heart transplanted    Anxiety    Insomnia    CKD (chronic kidney disease) stage 4, GFR 15-29 ml/min    Immunosuppression due to drug therapy    Fibromyalgia    Gastroesophageal reflux disease without esophagitis    Breast screening    Immunization deficiency    Essential hypertension    Aortic atherosclerosis    Chronic major depressive  disorder, recurrent episode    Iron deficiency anemia    Vaginal atrophy    Hyperparathyroidism    Hx of falling    Chronic midline low back pain without sciatica    Lightheadedness    Medication side effects    Obesity (BMI 30.0-34.9)    Edema    Asymptomatic menopausal state     Other osteoporosis without current pathological fracture    Subacute cough    Abnormal CT scan, kidney    Weakness of both lower extremities    Immunocompromised patient    Osteoporosis, post-menopausal    Ductal carcinoma in situ (DCIS) of left breast    Immunodeficiency secondary to radiation therapy    Thrombocytopenia, unspecified    Immunodeficiency due to chemotherapy    Strain of left shoulder    Left shoulder pain    Ulnar neuropathy of left upper extremity    Anemia associated with stage 4 chronic renal failure    Secondary and unspecified malignant neoplasm of axilla and upper limb lymph nodes    Abnormal thyroid function test    Slow transit constipation    Other hyperlipidemia    Sacroiliac joint pain    Impaired mobility    Cyst of right breast    Sacroiliitis    Right upper quadrant abdominal pain    DDD (degenerative disc disease), thoracolumbar    Ventral hernia without obstruction or gangrene    Acquired hypothyroidism    Decreased shoulder mobility, left    COVID     Review of patient's allergies indicates:   Allergen Reactions    Lisinopril Swelling and Rash    Augmentin [amoxicillin-pot clavulanate] Diarrhea       Medications:  Current Outpatient Medications on File Prior to Visit   Medication Sig Dispense Refill    albuterol (VENTOLIN HFA) 90 mcg/actuation inhaler Inhale 2 puffs into the lungs every 4 (four) hours as needed for Wheezing or Shortness of Breath (cough). Rescue 8 g 0    amLODIPine (NORVASC) 2.5 MG tablet Take 1 tablet (2.5 mg total) by mouth once daily. 90 tablet 3    aspirin (ECOTRIN) 81 MG EC tablet Take 1 tablet (81 mg total) by mouth once daily. 90 tablet 3    biotin 10,000 mcg Cap Take 1 tablet by  mouth once daily.      busPIRone (BUSPAR) 10 MG tablet TAKE 1 TABLET EVERY DAY 90 tablet 3    calcitonin, salmon, (FORTICAL) 200 unit/actuation nasal spray 1 spray by Nasal route once daily. 3 each 3    carvediloL (COREG) 6.25 MG tablet Take 1 tablet (6.25 mg total) by mouth 2 (two) times daily with meals. 180 tablet 3    cycloSPORINE modified, NEORAL, (NEORAL) 25 MG capsule TAKE 3 CAPSULES (75 MG TOTAL) BY MOUTH 2 (TWO) TIMES DAILY. 540 capsule 6    EVENING PRIMROSE OIL ORAL Take 1,000 mg by mouth once daily.      furosemide (LASIX) 20 MG tablet Take 1 tablet (20 mg total) by mouth once daily. 90 tablet 1    guaiFENesin (MUCINEX) 600 mg 12 hr tablet Take 1,200 mg by mouth 2 (two) times daily.      hydrALAZINE (APRESOLINE) 50 MG tablet Take 1 tablet (50 mg total) by mouth every 8 (eight) hours. TAKE 1 TABLETS  EVERY 8  HOURS. 270 tablet 3    LIDOcaine (LIDODERM) 5 % Place 2 patches onto the skin once daily. Remove & Discard patch within 12 hours or as directed by MD 60 patch 2    multivitamin capsule Take 1 capsule by mouth once daily.      pantoprazole (PROTONIX) 40 MG tablet TAKE 1 TABLET EVERY DAY 90 tablet 1    polyethylene glycol (GLYCOLAX) 17 gram/dose powder Take 17 g by mouth once daily.      pregabalin (LYRICA) 50 MG capsule Take 1 capsule (50 mg total) by mouth 2 (two) times daily. NOON & NIGHT TIME 180 capsule 1    psyllium husk (METAMUCIL ORAL) Take by mouth.      UNABLE TO FIND medication name: Stool softener (Ducolax) Laxative      vitamin E 400 UNIT capsule Take 400 Units by mouth once daily.      zolpidem (AMBIEN) 10 mg Tab TAKE 1 TABLET EVERY EVENING 90 tablet 0    [DISCONTINUED] temazepam (RESTORIL) 30 mg capsule TAKE 1 CAPSULE AT BEDTIME 30 capsule 5    [DISCONTINUED] UNABLE TO FIND 40 mg. Coricidin HBP       No current facility-administered medications on file prior to visit.       Medications have been reviewed and reconciled with patient at visit today.    Barriers to medications present (no  )    Fall since last office visit (no )      Exam:  Vitals:    05/18/23 0816   BP: 131/70   Pulse: 85   Temp: 98 °F (36.7 °C)     Weight: 76 kg (167 lb 8 oz)   Body mass index is 30.64 kg/m².      BP Readings from Last 3 Encounters:   05/18/23 131/70   05/16/23 (!) 140/86   05/03/23 135/73     Wt Readings from Last 3 Encounters:   05/18/23 0816 76 kg (167 lb 8 oz)   05/16/23 1025 77.3 kg (170 lb 8.4 oz)   05/03/23 0956 75.1 kg (165 lb 9.6 oz)            Physical Exam  Constitutional:       General: She is not in acute distress.  HENT:      Mouth/Throat:      Mouth: Mucous membranes are moist.   Eyes:      General: No scleral icterus.  Cardiovascular:      Rate and Rhythm: Normal rate and regular rhythm.      Heart sounds: Normal heart sounds.   Pulmonary:      Effort: Pulmonary effort is normal.      Breath sounds: Normal breath sounds.   Abdominal:      General: Bowel sounds are normal. There is no distension.      Palpations: Abdomen is soft.      Tenderness: There is no abdominal tenderness.   Musculoskeletal:      Right lower leg: Edema present.      Left lower leg: Edema present.      Comments: 2+ BLE edema.    Skin:     General: Skin is warm and dry.   Neurological:      Mental Status: She is alert.   Psychiatric:         Mood and Affect: Mood normal.         Behavior: Behavior normal.       Laboratory Reviewed: (Yes)  Lab Results   Component Value Date    WBC 5.67 02/08/2023    HGB 9.3 (L) 02/08/2023    HCT 29.1 (L) 02/08/2023     02/08/2023    CHOL 193 10/04/2022    TRIG 159 (H) 10/04/2022    HDL 46 10/04/2022    ALT 21 01/04/2023    AST 36 01/04/2023     03/08/2023    K 4.5 03/08/2023     03/08/2023    CREATININE 2.2 (H) 03/08/2023    BUN 30 (H) 03/08/2023    CO2 23 03/08/2023    TSH 8.908 (H) 03/08/2023    PSA <0.1 05/27/2008    INR 1.0 11/22/2021    HGBA1C 5.1 03/08/2023           Health Maintenance  Health Maintenance Topics with due status: Not Due       Topic Last Completion Date     Pneumococcal Vaccines (Age 65+) 10/22/2018    TETANUS VACCINE 09/09/2020    Colorectal Cancer Screening 02/25/2021    Lipid Panel 10/04/2022    DEXA Scan 01/25/2023    Hemoglobin A1c (Diabetic Prevention Screening) 03/08/2023    Mammogram 04/10/2023     Health Maintenance Due   Topic Date Due    COVID-19 Vaccine (3 - Moderna risk series) 08/10/2022       Assessment:  Problem List Items Addressed This Visit          Psychiatric    Chronic major depressive disorder, recurrent episode     Mood has improved.   Continue POC.              Cardiac/Vascular    Heart transplanted (Chronic)    Relevant Orders    X-Ray Chest PA And Lateral    CYCLOSPORINE LEVEL    Comprehensive Metabolic Panel       ID    COVID     Resume albuterol use since this helped relieve dyspnea.   CXR, BNP, CBC              Immunology/Multi System    Immunosuppression due to drug therapy (Chronic)     Cyclosporin level>800 at urgent care last month. Will repeat level in AM prior to meds.                 Oncology    Anemia associated with stage 4 chronic renal failure (Chronic)     CBC today with iron studies.   Has nephrology appt next month.            Iron deficiency anemia    Relevant Orders    Ferritin    Iron and TIBC    Reticulocytes       Endocrine    Hyperparathyroidism     Lab Results   Component Value Date    .8 (H) 01/03/2023    CALCIUM 9.1 03/08/2023    CAION 1.13 09/05/2018    PHOS 4.8 (H) 03/08/2023   Monitor.           Acquired hypothyroidism     Lab Results   Component Value Date    TSH 8.908 (H) 03/08/2023   Continue POC              Other    Insomnia    Relevant Medications    temazepam (RESTORIL) 30 mg capsule     Other Visit Diagnoses       COVID-19    -  Primary    Relevant Orders    SARS Coronavirus 2 Antigen, POCT Manual Read    X-Ray Chest PA And Lateral    DONOHUE (dyspnea on exertion)        Relevant Orders    B-TYPE NATRIURETIC PEPTIDE    CBC Auto Differential              Plan:  COVID-19  -     SARS Coronavirus 2 Antigen,  POCT Manual Read  -     X-Ray Chest PA And Lateral    Insomnia, unspecified type  -     temazepam (RESTORIL) 30 mg capsule; TAKE 1 CAPSULE AT BEDTIME  Dispense: 30 capsule; Refill: 5    Heart transplanted  -     X-Ray Chest PA And Lateral  -     Cancel: TACROLIMUS LEVEL; Future; Expected date: 05/18/2023  -     CYCLOSPORINE LEVEL; Future; Expected date: 05/18/2023  -     Comprehensive Metabolic Panel; Future; Expected date: 05/18/2023    DONOHUE (dyspnea on exertion)  -     B-TYPE NATRIURETIC PEPTIDE; Future; Expected date: 05/18/2023  -     CBC Auto Differential; Future; Expected date: 05/18/2023    Iron deficiency anemia, unspecified iron deficiency anemia type  -     Ferritin; Future; Expected date: 05/18/2023  -     Iron and TIBC; Future; Expected date: 05/18/2023  -     Reticulocytes; Future; Expected date: 05/18/2023    Immunosuppression due to drug therapy    COVID    Anemia associated with stage 4 chronic renal failure    Hyperparathyroidism    Acquired hypothyroidism    Chronic major depressive disorder, recurrent episode      -Patient's lab results were reviewed and discussed with patient  -Treatment options and alternatives were discussed with the patient. Patient expressed understanding. Patient was given the opportunity to ask questions and be an active participant in their medical care. Patient had no further questions or concerns at this time.   -Documentation of patient's health and condition was obtained from family member who was present during visit.  -Patient is an overall moderate risk for health complications from their medical conditions.       Follow up: Follow up in about 4 weeks (around 6/15/2023).      After visit summary printed and given to patient upon discharge.  Patient goals and care plan are included in After visit summary.    Total medical decision making time was 35 min.  The following issues were discussed: The primary encounter diagnosis was COVID-19. Diagnoses of Insomnia,  unspecified type, Heart transplanted, DONOHUE (dyspnea on exertion), Iron deficiency anemia, unspecified iron deficiency anemia type, Immunosuppression due to drug therapy, COVID, Anemia associated with stage 4 chronic renal failure, Hyperparathyroidism, Acquired hypothyroidism, and Chronic major depressive disorder, recurrent episode were also pertinent to this visit.    Health maintenance needs, recent test results and goals of care discussed with pt and questions answered.

## 2023-05-18 NOTE — PATIENT INSTRUCTIONS
Try cutting Ambien in half at night.     Labs tomorrow morning prior to taking medication.     Start using albuterol again. I expect this to significantly reduce shortness of breath. If it does not please let me know ASAP.

## 2023-05-19 ENCOUNTER — HOSPITAL ENCOUNTER (OUTPATIENT)
Dept: RADIOLOGY | Facility: HOSPITAL | Age: 68
Discharge: HOME | End: 2023-05-19
Attending: NURSE PRACTITIONER
Payer: MEDICARE

## 2023-05-19 ENCOUNTER — TELEPHONE (OUTPATIENT)
Dept: PRIMARY CARE CLINIC | Facility: CLINIC | Age: 68
End: 2023-05-19
Payer: MEDICARE

## 2023-05-19 PROCEDURE — 71046 XR CHEST PA AND LATERAL: ICD-10-PCS | Mod: 26,,, | Performed by: RADIOLOGY

## 2023-05-19 PROCEDURE — 71046 X-RAY EXAM CHEST 2 VIEWS: CPT | Mod: TC

## 2023-05-19 PROCEDURE — 71046 X-RAY EXAM CHEST 2 VIEWS: CPT | Mod: 26,,, | Performed by: RADIOLOGY

## 2023-05-22 ENCOUNTER — TELEPHONE (OUTPATIENT)
Dept: HEMATOLOGY/ONCOLOGY | Facility: CLINIC | Age: 68
End: 2023-05-22
Payer: MEDICARE

## 2023-05-22 NOTE — TELEPHONE ENCOUNTER
----- Message from Kelsie Burk PA-C sent at 5/22/2023  9:13 AM CDT -----  Please have Ms. Damon scheduled to see me at earliest convenience.

## 2023-05-23 ENCOUNTER — CLINICAL SUPPORT (OUTPATIENT)
Dept: REHABILITATION | Facility: HOSPITAL | Age: 68
End: 2023-05-23
Payer: MEDICARE

## 2023-05-23 DIAGNOSIS — M25.612 DECREASED SHOULDER MOBILITY, LEFT: Primary | ICD-10-CM

## 2023-05-23 PROCEDURE — 97140 MANUAL THERAPY 1/> REGIONS: CPT | Mod: PN

## 2023-05-23 PROCEDURE — 97110 THERAPEUTIC EXERCISES: CPT | Mod: PN

## 2023-05-23 NOTE — PROGRESS NOTES
Physical Therapy/Lymphedema Daily Treatment Note/PROGRESS NOTE     Name: Nadia Damon  Clinic Number: [id number]    Therapy Diagnosis:   Encounter Diagnosis   Name Primary?    Decreased shoulder mobility, left Yes          Physician: Kristine Delgado NP    Visit Date: 5/23/2023       Physician Orders: PT Eval and Treat   Medical Diagnosis: chronic left shoulder pain  Evaluation Date: 4/11/2023  Authorization Period Expiration: 4/9/2024  Progress Note due: 6/20 /2023  Plan of Care Certification Period: 6/20/2023  Visit #/Visits authorized: 3/ 20    Time In: 1105 am  Time Out: 1200 PM  Total Billable Time: 55 minutes    Precautions: Standard    Subjective     Pt reports: Has been in the hospital x 20 days with covid. She feels fatigued and shoulder feels tight  She was compliant with home exercise program.  Response to previous treatment: good  Functional change: none at this time    Pain: 4/10  Location: left axilla      Objective     Objective Measures updated at progress report unless specified      CMS Impairment/Limitation/Restriction for FOTO shoulder Survey    Therapist reviewed FOTO scores for Nadia Damon on 5/23/2023.   FOTO documents entered into [facility] - see Media section.    Limitation Score: 66% (eval), 50% (2nd)       Shoulder Range of Motion:   ACTIVE ROM LEFT RIGHT   Flexion 170 180   Abduction 170 180   Extension wnl wnl   IR/90deg Reach to bra line Reach to bra line   ER/90deg Reach to T2 Reach to T2      Strength: manual muscle test grades below      Upper Extremity Strength  Right upper extremity - 4/5 throughout  Left upper extremity - 4/5 throughout   Treatment:     Nadia received the following self care and home management x -- minutes        Nadia received therapeutic exercises to develop strength, ROM, flexibility, and posture for 30 minutes including:   THERAPEUTIC EXERCISES:  Continue HEP of AROM, stretching, and postural correction.     Intervention Performed Today  COVID FATIGUE    Supine overhead flexion   AAROM   Supine chicken wing/cactus arms      Open books/windmills x  standing    Modified child's pose x     Cat cow stretch x     Quadruped rock backs x     Child's pose      Reach and thread the needle x      Shoulder abduction overhead x            Plan for Next Visit: Will receive soft tissue mobilization to left shoulder and updated exercises prn for the left shoulder motion and strengthening for bilateral upper extremities/shoulders        Nadia received the following manual therapy techniques applied for (25) minutes, including:        Intervention Performed Today     Cervical soft tissue mobilization      Upper trap soft tissue mobilization  x    Clavicular, intercostal soft tissue mobilization      Pec muscle soft tissue mobilization/release x    Subscapularis soft tissue mobilization/release x    Latissimus dors muscle soft tissue mobilization  x    Rhomboids soft tissue mobilization      Axillary Web Syndrome release techniques N/a                 Plan for Next Visit: manual therapy as needed         Home Exercises Provided and Patient Education Provided:   See self care section above    Written Home Exercises Provided: yes. Refer to last visit  Exercises were reviewed and Nadia was able to demonstrate them prior to the end of the session.  Nadia demonstrated good  understanding of the education provided.     Assessment     Nadia has made good gains despite being sick for the past 20 days. She has improved with disability score, strength and range of motion of the left shoulder. Her most recent complaint is covid fatigue and her fatigue levels will be addressed at follow up sessions    Pt prognosis is Excellent.     Pt will continue to benefit from skilled outpatient physical therapy to address the deficits listed in the problem list box on initial evaluation, provide pt/family education and to maximize pt's level of independence in the home and community environment.     Pt's  spiritual, cultural and educational needs considered and pt agreeable to plan of care and goals.     Anticipated barriers to physical therapy: none    Goals:     Short Term goals: 5 weeks  1. Patient will demonstrate 100% understanding of lymphedema risk reduction practices to include self monitoring for lymphedema. (GOAL MET)  2. Patient will demonstrate independence with Home Exercise program established. (progressing, not met)  3. Pt will increase AROM/PROM in shoulder abduction ROM to 130 degrees on left to improve functional reach, carry, push, pull pain free. (GOAL MET)  4. Pt will increase AROM/PROM in shoulder flexion to 115 degrees on right to improve functional reach, carry, push, pull pain free.(GOAL MET)        Long Term Goals: 10 weeks   1.  Pt will increase AROM/PROM in shoulder flexion to 170 degrees on right to improve functional reach, carry, push, pull pain free. (GOAL MET)  2. Pt will increase strength to 4+/5 in gross UE musculature to improve tolerance to all functional activities pain free. (progressing, not met)  3. Pt will demonstrate full/maximized tissue mobility to increase ROM and promote healthy tissue to be pain free at discharge. (progressing, not met)  4. Pt will report decrease in overall worst pain to 2/10 at discharge. (progressing, not met)  5. Pt will increase AROM/PROM in shoulder abduction ROM to 170 degrees on right to improve functional reach, carry, push, pull pain free. (GOAL MET)  6. Patient will report compliance with walking program 5x week for 20 min each day to improve overall cardiovascular function and decrease cancer related fatigue at discharge. (progressing, not met)        Plan   Outpatient physical therapy 2x week for 10 weeks to include the following:   Manual Therapy, Patient Education, Self Care, Therapeutic Activities, and Therapeutic Exercise.     Plan of care Certification: 4/11/2023 to 6/20/2023.        Cornelia Tian, PT

## 2023-05-24 ENCOUNTER — TELEPHONE (OUTPATIENT)
Dept: UROLOGY | Facility: CLINIC | Age: 68
End: 2023-05-24
Payer: MEDICARE

## 2023-05-24 ENCOUNTER — DOCUMENTATION ONLY (OUTPATIENT)
Dept: PRIMARY CARE CLINIC | Facility: CLINIC | Age: 68
End: 2023-05-24
Payer: MEDICARE

## 2023-05-24 ENCOUNTER — TELEPHONE (OUTPATIENT)
Dept: CARDIOLOGY | Facility: HOSPITAL | Age: 68
End: 2023-05-24
Payer: MEDICARE

## 2023-05-25 ENCOUNTER — PROCEDURE VISIT (OUTPATIENT)
Dept: UROLOGY | Facility: CLINIC | Age: 68
End: 2023-05-25
Payer: MEDICARE

## 2023-05-25 DIAGNOSIS — N99.3 PROLAPSE OF VAGINAL CUFF AFTER HYSTERECTOMY: ICD-10-CM

## 2023-05-25 DIAGNOSIS — N39.3 SUI (STRESS URINARY INCONTINENCE, FEMALE): ICD-10-CM

## 2023-05-25 PROCEDURE — 51784 PR ANAL/URINARY MUSCLE STUDY: ICD-10-PCS | Mod: 26,51,S$GLB, | Performed by: UROLOGY

## 2023-05-25 PROCEDURE — 51797 PR VOIDING PRESS STUDY INTRA-ABDOMINAL VOID: ICD-10-PCS | Mod: 26,S$GLB,, | Performed by: UROLOGY

## 2023-05-25 PROCEDURE — 51728 PR COMPLEX CYSTOMETROGRAM VOIDING PRESSURE STUDIES: ICD-10-PCS | Mod: 26,S$GLB,, | Performed by: UROLOGY

## 2023-05-25 PROCEDURE — 51797 INTRAABDOMINAL PRESSURE TEST: CPT | Mod: 26,S$GLB,, | Performed by: UROLOGY

## 2023-05-25 PROCEDURE — 51784 ANAL/URINARY MUSCLE STUDY: CPT | Mod: 26,51,S$GLB, | Performed by: UROLOGY

## 2023-05-25 PROCEDURE — 51728 CYSTOMETROGRAM W/VP: CPT | Mod: 26,S$GLB,, | Performed by: UROLOGY

## 2023-05-25 NOTE — PROCEDURES
Procedures  05/25/2023    Procedure: Urodynamics    HPI:   5/25/23- here today for urodynamics.  This is a referral from Gynecology.    Procedure in Detail: Pt voided and a PVR was measured with straight cath using sterile technique. Urodynamics catheters were placed in bladder and rectum. EMG pads were placed. The patient was positioned on the urinal over the CMG cup. The test was performed for pressure-flow studies of storage and voiding function. Valsalva was performed at intervals to evaluate catheter function and detrusor response/incontinence. PVR was again measured after the patient voided. The catheters were withdrawn and the patient allowed to dress.      Urodynamics:    Filling Phase:  Resting Pressure- 0 cm/H2O  First sensation- 67 ml  First desire- 221 ml  Strong desire- 298 ml  Capacity- 301 ml  Uninhibited contractions/detrusor instability- Noted, small leaks.  Valsalva demonstrates small leak.  No loss of compliance.    Voiding Phase:  Max flow rate- 10 ml/sec  Voided volume- 163 ml  Residual- 10 ml  Intraabdominal pressure- 27 cm/H2O   Peak detrusor pressure- 17 cm/H2O  Peak flow pressure- 15 cm/H2O  Normal detrusor pressure shape.  EMG normal.      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  Normal bladder capacity, no evidence of residual urine with no loss of compliance.  Patient had coordinated bladder contractions.  Low pressure voiding with low flow rate, therefore no evidence of obstructive voiding.      In conclusion patient has evidence of stress incontinence, possible benefit from surgical management.  Patient also has evidence of detrusor instability, would recommend anticholinergics especially prior to surgical management to reduce risk of exacerbation after surgery.

## 2023-05-26 NOTE — PROGRESS NOTES
Called regarding 5/26/2023 missed visit. Left a voicemail reminding her of the next scheduled visit on 5/30/2023 and that if she can not make that appointment, call by Monday 5/29/2023 to cancel or cancel via My Chart.

## 2023-05-28 DIAGNOSIS — M51.35 DDD (DEGENERATIVE DISC DISEASE), THORACOLUMBAR: ICD-10-CM

## 2023-05-29 ENCOUNTER — OFFICE VISIT (OUTPATIENT)
Dept: HEMATOLOGY/ONCOLOGY | Facility: CLINIC | Age: 68
End: 2023-05-29
Payer: MEDICARE

## 2023-05-29 VITALS
HEART RATE: 91 BPM | DIASTOLIC BLOOD PRESSURE: 88 MMHG | TEMPERATURE: 98 F | SYSTOLIC BLOOD PRESSURE: 141 MMHG | WEIGHT: 168.63 LBS | HEIGHT: 62 IN | OXYGEN SATURATION: 100 % | BODY MASS INDEX: 31.03 KG/M2

## 2023-05-29 DIAGNOSIS — D84.821 IMMUNOSUPPRESSION DUE TO DRUG THERAPY: Chronic | ICD-10-CM

## 2023-05-29 DIAGNOSIS — Z79.899 IMMUNODEFICIENCY DUE TO CHEMOTHERAPY: ICD-10-CM

## 2023-05-29 DIAGNOSIS — D50.9 IRON DEFICIENCY ANEMIA, UNSPECIFIED IRON DEFICIENCY ANEMIA TYPE: ICD-10-CM

## 2023-05-29 DIAGNOSIS — N18.4 CKD (CHRONIC KIDNEY DISEASE) STAGE 4, GFR 15-29 ML/MIN: Primary | ICD-10-CM

## 2023-05-29 DIAGNOSIS — D05.12 DUCTAL CARCINOMA IN SITU (DCIS) OF LEFT BREAST: ICD-10-CM

## 2023-05-29 DIAGNOSIS — Z79.899 IMMUNOSUPPRESSION DUE TO DRUG THERAPY: Chronic | ICD-10-CM

## 2023-05-29 DIAGNOSIS — D63.1 ANEMIA ASSOCIATED WITH STAGE 4 CHRONIC RENAL FAILURE: Chronic | ICD-10-CM

## 2023-05-29 DIAGNOSIS — T45.1X5A IMMUNODEFICIENCY DUE TO CHEMOTHERAPY: ICD-10-CM

## 2023-05-29 DIAGNOSIS — D84.821 IMMUNODEFICIENCY DUE TO CHEMOTHERAPY: ICD-10-CM

## 2023-05-29 DIAGNOSIS — N18.4 ANEMIA ASSOCIATED WITH STAGE 4 CHRONIC RENAL FAILURE: Chronic | ICD-10-CM

## 2023-05-29 DIAGNOSIS — D69.6 THROMBOCYTOPENIA, UNSPECIFIED: ICD-10-CM

## 2023-05-29 DIAGNOSIS — C77.3 SECONDARY AND UNSPECIFIED MALIGNANT NEOPLASM OF AXILLA AND UPPER LIMB LYMPH NODES: ICD-10-CM

## 2023-05-29 PROCEDURE — 3077F PR MOST RECENT SYSTOLIC BLOOD PRESSURE >= 140 MM HG: ICD-10-PCS | Mod: CPTII,S$GLB,,

## 2023-05-29 PROCEDURE — 99999 PR PBB SHADOW E&M-EST. PATIENT-LVL IV: ICD-10-PCS | Mod: PBBFAC,,,

## 2023-05-29 PROCEDURE — 1101F PR PT FALLS ASSESS DOC 0-1 FALLS W/OUT INJ PAST YR: ICD-10-PCS | Mod: CPTII,S$GLB,,

## 2023-05-29 PROCEDURE — 3044F PR MOST RECENT HEMOGLOBIN A1C LEVEL <7.0%: ICD-10-PCS | Mod: CPTII,S$GLB,,

## 2023-05-29 PROCEDURE — 1125F AMNT PAIN NOTED PAIN PRSNT: CPT | Mod: CPTII,S$GLB,,

## 2023-05-29 PROCEDURE — 1157F PR ADVANCE CARE PLAN OR EQUIV PRESENT IN MEDICAL RECORD: ICD-10-PCS | Mod: CPTII,S$GLB,,

## 2023-05-29 PROCEDURE — 99214 OFFICE O/P EST MOD 30 MIN: CPT | Mod: S$GLB,,,

## 2023-05-29 PROCEDURE — 3079F DIAST BP 80-89 MM HG: CPT | Mod: CPTII,S$GLB,,

## 2023-05-29 PROCEDURE — 99214 PR OFFICE/OUTPT VISIT, EST, LEVL IV, 30-39 MIN: ICD-10-PCS | Mod: S$GLB,,,

## 2023-05-29 PROCEDURE — 1159F MED LIST DOCD IN RCRD: CPT | Mod: CPTII,S$GLB,,

## 2023-05-29 PROCEDURE — 3079F PR MOST RECENT DIASTOLIC BLOOD PRESSURE 80-89 MM HG: ICD-10-PCS | Mod: CPTII,S$GLB,,

## 2023-05-29 PROCEDURE — 3288F FALL RISK ASSESSMENT DOCD: CPT | Mod: CPTII,S$GLB,,

## 2023-05-29 PROCEDURE — 1157F ADVNC CARE PLAN IN RCRD: CPT | Mod: CPTII,S$GLB,,

## 2023-05-29 PROCEDURE — 1125F PR PAIN SEVERITY QUANTIFIED, PAIN PRESENT: ICD-10-PCS | Mod: CPTII,S$GLB,,

## 2023-05-29 PROCEDURE — 1159F PR MEDICATION LIST DOCUMENTED IN MEDICAL RECORD: ICD-10-PCS | Mod: CPTII,S$GLB,,

## 2023-05-29 PROCEDURE — 3008F PR BODY MASS INDEX (BMI) DOCUMENTED: ICD-10-PCS | Mod: CPTII,S$GLB,,

## 2023-05-29 PROCEDURE — 3288F PR FALLS RISK ASSESSMENT DOCUMENTED: ICD-10-PCS | Mod: CPTII,S$GLB,,

## 2023-05-29 PROCEDURE — 3044F HG A1C LEVEL LT 7.0%: CPT | Mod: CPTII,S$GLB,,

## 2023-05-29 PROCEDURE — 3008F BODY MASS INDEX DOCD: CPT | Mod: CPTII,S$GLB,,

## 2023-05-29 PROCEDURE — 1101F PT FALLS ASSESS-DOCD LE1/YR: CPT | Mod: CPTII,S$GLB,,

## 2023-05-29 PROCEDURE — 3077F SYST BP >= 140 MM HG: CPT | Mod: CPTII,S$GLB,,

## 2023-05-29 PROCEDURE — 99999 PR PBB SHADOW E&M-EST. PATIENT-LVL IV: CPT | Mod: PBBFAC,,,

## 2023-05-29 RX ORDER — LIDOCAINE 50 MG/G
PATCH TOPICAL
Qty: 180 PATCH | Refills: 5 | Status: ON HOLD | OUTPATIENT
Start: 2023-05-29 | End: 2023-08-11 | Stop reason: HOSPADM

## 2023-05-29 NOTE — PROGRESS NOTES
Subjective:       Patient ID: Nadia Damon is a 67 y.o. female.    Chief Complaint: Anemia    Primary Oncologist/Hematologist: Dr. Collins     HPI: Ms. Damon is a 67 year old female who is following up for her anemia due to CKD. Epo has been initiated, last dose 9/12/22-20kU.  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 She was treated with 1 cycle of systemic chemotherapy cytoxan and taxotere and udenyca that was discontinued due to toxicity. She had radiation, completed 4/14/22.   She then has abnormal mammogram of R breast. S/p bx, which is benign.   Pmhx: heart transplant 26 yrs ago, on anti rejection medication. chronic back pain and disc degeneration    Today: patient states she has been well. She has been dealing with some back pain, following with pain management for this. She states some of the pain may be due to constipation. She also has abdominal hernia, being followed by general surgery, not interested in surgery currently. Not painful.   She recently had covid infection which has dropped her hemoglobin under 9g/dL. COVID positive 5/18/23. Tx with albuterol HFA, tussin DM. PT for lymphedema. Transplant clinic recommends 20 day isolation and COVID retest.   She states she has been feeling better. She denies any fatigue, sob, bleeding, weight loss, n/v/d/c, fevers. She has been trying to stay hydrated. She has been seeing urology for stress incontinence. States she will need surgery done, by gynecology.     Social History     Socioeconomic History    Marital status: Single    Number of children: 2    Highest education level: 11th grade   Occupational History    Occupation: Retired   Tobacco Use    Smoking status: Never    Smokeless tobacco: Never   Substance and Sexual Activity    Alcohol use: Never     Alcohol/week: 0.0 standard drinks    Drug use: No    Sexual activity: Not Currently     Partners: Male     Birth  control/protection: See Surgical Hx   Other Topics Concern    Are you pregnant or think you may be? No    Breast-feeding No   Social History Narrative    Single. 2 children , 1  at 31 yoa  2014 strep throat -  pneumonia and renal complications after not completing course of AB. Other child lives in [place], [place]. Has a cousin locally that could help in an emergency. 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 Caffeine intake =- 1 cola a day. No coffee, + occasional tea, avoids caffeine especially at night. Still drives. She does not have a Living Will or Advanced directive.        Past Medical History:   Diagnosis Date    Abdominal wall hernia     CT Renal 2018---Small fat containing superior ventral abdominal wall hernia at the epicardial pacing lead site.    Abnormal mammogram 10/12/2021    GRACE (acute kidney injury) 2021    Anxiety     Arthritis     ZEN HIPS    Breast cancer in female 2021    LEFT BREAST    C. difficile colitis 2021    Cellulitis of axilla, left 2021    Chronic diastolic heart failure 2021    Chronic kidney disease     stage 4, GFR 15-29 ml/min    Chronic midline low back pain without sciatica 2018    Closed nondisplaced fracture of distal phalanx of left great toe with routine healing 10/22/2018    Coronary artery disease 1993    heart transplant    Cystitis 5/10/2022    Depression     Fibromyalgia     on Lyrica    Heart failure     native heart cardiomyopathy    Heart transplanted     due to cardiomyopathy    History of hyperparathyroidism; Hyperparathyroidism, secondary renal     PT DENIES    Hypertension     Immune disorder     anti rejection meds    Iron deficiency anemia 08/15/2017    Kidney stones     passed per pt    Obesity     Other osteoporosis without current pathological fracture 2019    Severe sepsis 2021    Shingles 2003 approx    left leg    Trouble in sleeping     Urinary incontinence         Family History   Problem Relation Age of Onset    Cancer Mother 38        breast    Breast cancer Mother     Breast cancer Maternal Grandmother     Heart disease Maternal Grandmother     Hypertension Son     Cataracts Cousin     Diabetes Neg Hx     Stroke Neg Hx     Kidney disease Neg Hx     Asthma Neg Hx     COPD Neg Hx     Melanoma Neg Hx     Hyperlipidemia Neg Hx        Past Surgical History:   Procedure Laterality Date    BLADDER SURGERY  2015 approx    mesh - Dr Everett then 2nd reconstructive sx Dr Onofre    BREAST BIOPSY Bilateral     NEGATIVE    BREAST BIOPSY Right 10/31/2022    benign    BREAST LUMPECTOMY Left 2021    BREAST SURGERY Left 09/28/2015    Bx - benign    BREAST SURGERY Right 12/2015    Bx benign    CARDIAC PACEMAKER REMOVAL Left 06/26/2014    Pacer defirillator removed. Put in 1993 aat time of heart transplant    CARPAL TUNNEL RELEASE Left 03/03/2015    Dr. Hall    COLONOSCOPY N/A 02/25/2021    Procedure: COLONOSCOPY;  Surgeon: Freida Ramirez MD;  Location: [facility] ENDO;  Service: Endoscopy;  Laterality: N/A;    CYSTOCELE REPAIR      Twice with mesh removal    EPIDURAL STEROID INJECTION INTO CERVICAL SPINE N/A 02/02/2023    Procedure: T11/T12 IL HELLEN;  Surgeon: Jassi Pierre MD;  Location: [facility] PAIN MGT;  Service: Pain Management;  Laterality: N/A;    HEART TRANSPLANT  1993    HERNIA REPAIR Right 1971 approx    Inguinal    HYSTERECTOMY  1983    vag hyst /LSO     INCISION AND DRAINAGE OF ABSCESS Left 12/24/2021    Procedure: INCISION AND DRAINAGE, ABSCESS;  Surgeon: Joseph Longo MD;  Location: [facility] OR;  Service: General;  Laterality: Left;    INJECTION OF ANESTHETIC AGENT AROUND MEDIAL BRANCH NERVES INNERVATING LUMBAR FACET JOINT Right 10/19/2022    Procedure: Right L4/L5 and L5/S1 MBB;  Surgeon: Jassi Pierre MD;  Location: [facility] PAIN MGT;  Service: Pain Management;  Laterality: Right;    INJECTION OF ANESTHETIC AGENT AROUND MEDIAL BRANCH NERVES INNERVATING LUMBAR FACET JOINT  Right 11/09/2022    Procedure: Right L4/L5 and L5/S1 MBB;  Surgeon: Jassi Pierre MD;  Location: [facility] PAIN MGT;  Service: Pain Management;  Laterality: Right;    INJECTION OF ANESTHETIC AGENT INTO SACROILIAC JOINT Right 08/22/2022    Procedure: Right SIJ Injection Right L5/S1 Facte Injection;  Surgeon: Jassi Pierre MD;  Location: [facility] PAIN MGT;  Service: Pain Management;  Laterality: Right;    INSERTION OF TUNNELED CENTRAL VENOUS CATHETER (CVC) WITH SUBCUTANEOUS PORT N/A 11/09/2021    Procedure: [id number]-C-CATH;  Surgeon: Christoph Douglas MD;  Location: [facility] OR;  Service: General;  Laterality: N/A;    RADIOFREQUENCY THERMOCOAGULATION Right 12/07/2022    Procedure: Right L4/L5 and L5/S1 Lumbar RFA;  Surgeon: Jassi Pierre MD;  Location: [facility] PAIN MGT;  Service: Pain Management;  Laterality: Right;    REMOVAL OF VASCULAR ACCESS PORT      SENTINEL LYMPH NODE BIOPSY Left 10/12/2021    Procedure: BIOPSY, LYMPH NODE, SENTINEL;  Surgeon: Christoph Douglas MD;  Location: [facility] OR;  Service: General;  Laterality: Left;    TOE SURGERY         Review of Systems   Constitutional:  Negative for activity change, appetite change, chills, diaphoresis, fatigue, fever and unexpected weight change.   HENT:  Negative for congestion and nosebleeds.    Respiratory:  Positive for cough. Negative for shortness of breath.    Cardiovascular:  Negative for chest pain and leg swelling.   Gastrointestinal:  Negative for abdominal pain, blood in stool, constipation, diarrhea, nausea and vomiting.   Genitourinary:  Negative for hematuria.   Musculoskeletal:  Positive for arthralgias and back pain.   Skin:  Negative for color change and pallor.   Allergic/Immunologic: Positive for immunocompromised state.   Neurological:  Positive for numbness. Negative for dizziness, weakness, light-headedness and headaches.   Hematological:  Does not bruise/bleed easily.       Medication List with Changes/Refills   Current Medications    ALBUTEROL  (VENTOLIN HFA) 90 MCG/ACTUATION INHALER    Inhale 2 puffs into the lungs every 4 (four) hours as needed for Wheezing or Shortness of Breath (cough). Rescue    AMLODIPINE (NORVASC) 2.5 MG TABLET    Take 1 tablet (2.5 mg total) by mouth once daily.    ASPIRIN (ECOTRIN) 81 MG EC TABLET    Take 1 tablet (81 mg total) by mouth once daily.    BIOTIN 10,000 MCG CAP    Take 1 tablet by mouth once daily.    BUSPIRONE (BUSPAR) 10 MG TABLET    TAKE 1 TABLET EVERY DAY    CALCITONIN, SALMON, (FORTICAL) 200 UNIT/ACTUATION NASAL SPRAY    1 spray by Nasal route once daily.    CARVEDILOL (COREG) 6.25 MG TABLET    Take 1 tablet (6.25 mg total) by mouth 2 (two) times daily with meals.    CYCLOSPORINE MODIFIED, NEORAL, (NEORAL) 25 MG CAPSULE    TAKE 3 CAPSULES (75 MG TOTAL) BY MOUTH 2 (TWO) TIMES DAILY.    EVENING PRIMROSE OIL ORAL    Take 1,000 mg by mouth once daily.    FUROSEMIDE (LASIX) 20 MG TABLET    Take 1 tablet (20 mg total) by mouth once daily.    GUAIFENESIN (MUCINEX) 600 MG 12 HR TABLET    Take 1,200 mg by mouth 2 (two) times daily.    HYDRALAZINE (APRESOLINE) 50 MG TABLET    Take 1 tablet (50 mg total) by mouth every 8 (eight) hours. TAKE 1 TABLETS  EVERY 8  HOURS.    LIDOCAINE (LIDODERM) 5 %    PLACE 2 PATCHES ONTO THE SKIN ONCE DAILY. REMOVE AND DISCARD PATCHES WITHIN 12 HOURS OR AS DIRECTED BY MD    MULTIVITAMIN CAPSULE    Take 1 capsule by mouth once daily.    PANTOPRAZOLE (PROTONIX) 40 MG TABLET    TAKE 1 TABLET EVERY DAY    POLYETHYLENE GLYCOL (GLYCOLAX) 17 GRAM/DOSE POWDER    Take 17 g by mouth once daily.    PREGABALIN (LYRICA) 50 MG CAPSULE    Take 1 capsule (50 mg total) by mouth 2 (two) times daily. NOON & NIGHT TIME    PSYLLIUM HUSK (METAMUCIL ORAL)    Take by mouth.    TEMAZEPAM (RESTORIL) 30 MG CAPSULE    TAKE 1 CAPSULE AT BEDTIME    UNABLE TO FIND    medication name: Stool softener (Ducolax) Laxative    VITAMIN E 400 UNIT CAPSULE    Take 400 Units by mouth once daily.    ZOLPIDEM (AMBIEN) 10 MG TAB    TAKE 1  TABLET EVERY EVENING     Objective:     Vitals:    05/29/23 1437   Temp: 97.9 °F (36.6 °C)       Physical Exam  Constitutional:       General: She is not in acute distress.     Appearance: She is not ill-appearing, toxic-appearing or diaphoretic.   HENT:      Head: Normocephalic and atraumatic.   Eyes:      Conjunctiva/sclera: Conjunctivae normal.   Cardiovascular:      Rate and Rhythm: Normal rate.   Pulmonary:      Effort: Pulmonary effort is normal.   Abdominal:      Comments: Epigastric abdominal hernia    Musculoskeletal:      Right lower leg: No edema.   Skin:     General: Skin is warm and dry.      Coloration: Skin is not jaundiced or pale.      Findings: No bruising, erythema, lesion or rash.   Neurological:      Mental Status: She is alert.      Gait: Gait normal.          Labs/Results:  Lab Results   Component Value Date    WBC 2.66 (L) 05/19/2023    RBC 3.01 (L) 05/19/2023    HGB 8.6 (L) 05/19/2023    HCT 28.1 (L) 05/19/2023    MCV 93 05/19/2023    MCH 28.6 05/19/2023    MCHC 30.6 (L) 05/19/2023    RDW 15.9 (H) 05/19/2023     05/19/2023    MPV 10.3 05/19/2023    GRAN 1.5 (L) 05/19/2023    GRAN 56.7 05/19/2023    LYMPH 0.6 (L) 05/19/2023    LYMPH 20.7 05/19/2023    MONO 0.4 05/19/2023    MONO 16.5 (H) 05/19/2023    EOS 0.1 05/19/2023    BASO 0.01 05/19/2023    EOSINOPHIL 5.3 05/19/2023    BASOPHIL 0.4 05/19/2023     CMP  Sodium   Date Value Ref Range Status   05/19/2023 146 (H) 136 - 145 mmol/L Final     Potassium   Date Value Ref Range Status   05/19/2023 4.6 3.5 - 5.1 mmol/L Final     Chloride   Date Value Ref Range Status   05/19/2023 113 (H) 95 - 110 mmol/L Final     CO2   Date Value Ref Range Status   05/19/2023 21 (L) 23 - 29 mmol/L Final     Glucose   Date Value Ref Range Status   05/19/2023 97 70 - 110 mg/dL Final     BUN   Date Value Ref Range Status   05/19/2023 34 (H) 8 - 23 mg/dL Final     Creatinine   Date Value Ref Range Status   05/19/2023 2.2 (H) 0.5 - 1.4 mg/dL Final     Calcium    Date Value Ref Range Status   05/19/2023 9.2 8.7 - 10.5 mg/dL Final     Total Protein   Date Value Ref Range Status   05/19/2023 7.1 6.0 - 8.4 g/dL Final     Albumin   Date Value Ref Range Status   05/19/2023 3.4 (L) 3.5 - 5.2 g/dL Final     Total Bilirubin   Date Value Ref Range Status   05/19/2023 0.5 0.1 - 1.0 mg/dL Final     Comment:     For infants and newborns, interpretation of results should be based  on gestational age, weight and in agreement with clinical  observations.    Premature Infant recommended reference ranges:  Up to 24 hours.............<8.0 mg/dL  Up to 48 hours............<12.0 mg/dL  3-5 days..................<15.0 mg/dL  6-29 days.................<15.0 mg/dL       Alkaline Phosphatase   Date Value Ref Range Status   05/19/2023 106 55 - 135 U/L Final     AST   Date Value Ref Range Status   05/19/2023 30 10 - 40 U/L Final     ALT   Date Value Ref Range Status   05/19/2023 20 10 - 44 U/L Final     Anion Gap   Date Value Ref Range Status   05/19/2023 12 8 - 16 mmol/L Final     eGFR   Date Value Ref Range Status   05/19/2023 23.8 (A) >60 mL/min/1.73 m^2 Final      Latest Reference Range & Units 05/19/23 09:58   Iron 30 - 160 ug/dL 66   TIBC 250 - 450 ug/dL 287   Saturated Iron 20 - 50 % 23   Transferrin 200 - 375 mg/dL 194 (L)   Ferritin 20.0 - 300.0 ng/mL 148   Retic 0.5 - 2.5 % 2.3     Assessment:     Problem List Items Addressed This Visit          Renal/    CKD (chronic kidney disease) stage 4, GFR 15-29 ml/min - Primary       Immunology/Multi System    Immunosuppression due to drug therapy (Chronic)    Immunodeficiency due to chemotherapy       Hematology    Thrombocytopenia, unspecified       Oncology    Anemia associated with stage 4 chronic renal failure (Chronic)    Iron deficiency anemia    Ductal carcinoma in situ (DCIS) of left breast    Secondary and unspecified malignant neoplasm of axilla and upper limb lymph nodes     Plan:     Ductal carcinoma in situ (DCIS) of left  breast  --continue follow up with radiation oncology and survivorship  --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 She was treated with 1 cycle of systemic chemotherapy cytoxan and taxotere and udenyca that was discontinued due to toxicity. She had radiation, completed 4/14/22  --R abnormal mammogram and Bx-benign.    --continue to follow with them in October for breast exam     Iron deficiency anemia, unspecified iron deficiency anemia type  --iron: 66, sat: 23%, ferritin: 148- WNL  --continue to monitor    Anemia associated with stage 4 chronic renal failure  --hgb:8.6 , hmt:28.1  --hgb ranges from 9-11g/dL.   --patient would like to hold off on any further epo injections if hemoglobin remains over 9g/dl, which is reasonable given her hgb is stable.  --epo 20kU for hgb<10g/dL  --last epo 9/2022  --kidney function consistent       Immunocompromised patient  --on immunosuppressive medications  --continue to monitor  --following up with transplant   --WBC: 2.66     Follow-Up: epo today and again in 2 weeks. 1 month with cbc only for possible epo. 3 months with cbc cmp iron/tibc ferritin prior       Kelsie Burk PA-C  Hematology Oncology    Route Chart for Scheduling    Med Onc Chart Routing      Follow up with physician    Follow up with LIA 3 months. with cbc cmp iron/tibc ferritin prior for possible epo.   Infusion scheduling note    Injection scheduling note epo in 1 week then again 2 weeks later   Labs CBC, CMP, ferritin and iron and TIBC   Scheduling:  Preferred lab:  Lab interval:     Imaging    Pharmacy appointment    Other referrals             Therapy Plan Information  epoetin tristan-epbx injection 20,000 Units  20,000 Units, Subcutaneous, PRN

## 2023-06-05 ENCOUNTER — TELEPHONE (OUTPATIENT)
Dept: INFUSION THERAPY | Facility: HOSPITAL | Age: 68
End: 2023-06-05
Payer: MEDICARE

## 2023-06-05 NOTE — TELEPHONE ENCOUNTER
Spoke with patient.  She can not make it in today.  Her car broke down.  Rescheduled appointment for Friday 6/9  per patients request.   Statement Selected

## 2023-06-05 NOTE — TELEPHONE ENCOUNTER
----- Message from Chey Canales sent at 6/5/2023  7:08 AM CDT -----  Contact: [phone]  Pt is calling regarding infusion today . Please call back at [phone] . Thanks/dj

## 2023-06-08 ENCOUNTER — TELEPHONE (OUTPATIENT)
Dept: OBSTETRICS AND GYNECOLOGY | Facility: CLINIC | Age: 68
End: 2023-06-08
Payer: MEDICARE

## 2023-06-08 NOTE — TELEPHONE ENCOUNTER
Pt stated that it has been 10 days since she saw Dr. Cochran and he informed her that once her testing was done he would talk to you about it and yall would decide what surgery would be best for her. Pt states she is in a lot of pain and is having trouble urinating because of her vaginal are.     In your note you stated :Will consider robotic colposacropexy with possible Viera procedure after Urodynamic studies   Offered pessary, patient is not interested     Patient would like a call from you stated the results from the test and schedule surgery. Please advise.

## 2023-06-08 NOTE — TELEPHONE ENCOUNTER
----- Message from Krys Verduzco sent at 6/8/2023  9:28 AM CDT -----  Contact: self  Pt is asking for an return call in reference to scheduling procedure, pt states she was suppose to receive an phone call after seeing urologist ,please call back at .[phone] Thx CJ

## 2023-06-08 NOTE — TELEPHONE ENCOUNTER
Called pt to set up surgery consult. Pt stated she preferred in person visit. Made in person visit for pt.

## 2023-06-09 ENCOUNTER — INFUSION (OUTPATIENT)
Dept: INFUSION THERAPY | Facility: HOSPITAL | Age: 68
End: 2023-06-09
Attending: INTERNAL MEDICINE
Payer: MEDICARE

## 2023-06-09 VITALS
HEART RATE: 91 BPM | OXYGEN SATURATION: 98 % | SYSTOLIC BLOOD PRESSURE: 135 MMHG | RESPIRATION RATE: 18 BRPM | TEMPERATURE: 98 F | DIASTOLIC BLOOD PRESSURE: 75 MMHG

## 2023-06-09 DIAGNOSIS — D63.1 ANEMIA ASSOCIATED WITH STAGE 4 CHRONIC RENAL FAILURE: Primary | ICD-10-CM

## 2023-06-09 DIAGNOSIS — M81.8 OTHER OSTEOPOROSIS WITHOUT CURRENT PATHOLOGICAL FRACTURE: ICD-10-CM

## 2023-06-09 DIAGNOSIS — N18.4 ANEMIA ASSOCIATED WITH STAGE 4 CHRONIC RENAL FAILURE: Primary | ICD-10-CM

## 2023-06-09 PROCEDURE — 96372 THER/PROPH/DIAG INJ SC/IM: CPT

## 2023-06-09 PROCEDURE — 63600175 PHARM REV CODE 636 W HCPCS: Mod: JZ,JG,EC

## 2023-06-09 RX ADMIN — EPOETIN ALFA-EPBX 20000 UNITS: 20000 INJECTION, SOLUTION INTRAVENOUS; SUBCUTANEOUS at 12:06

## 2023-06-09 NOTE — DISCHARGE INSTRUCTIONS
..[facility]  [address] [facility]  [address]  [phone] phone     [phone] fax  Hours of Operation: Monday- Friday 8:00am- 5:00pm  After hours phone  [phone]  Hematology / Oncology Physicians on call    Dr. Dennis Chakraborty      Nurse Practitioners:    Kristine Delgado, ONELIA Duran, ONELIA Mendoza, ONELIA Marina, ONELIA Montero, ONELIA Burk, PA      Please don't hesitate to call if you have any concerns.    .FALL PREVENTION   Falls often occur due to slipping, tripping or losing your balance. Here are ways to reduce your risk of falling again.   Was there anything that caused your fall that can be fixed, removed or replaced?   Make your home safe by keeping walkways clear of objects you may trip over.   Use non-slip pads under rugs.   Do not walk in poorly lit areas.   Do not stand on chairs or wobbly ladders.   Use caution when reaching overhead or looking upward. This position can cause a loss of balance.   Be sure your shoes fit properly, have non-slip bottoms and are in good condition.   Be cautious when going up and down stairs, curbs, and when walking on uneven sidewalks.   If your balance is poor, consider using a cane or walker.   If your fall was related to alcohol use, stop or limit alcohol intake.   If your fall was related to use of sleeping medicines, talk to your doctor about this. You may need to reduce your dosage at bedtime if you awaken during the night to go to the bathroom.   To reduce the need for nighttime bathroom trips:   Avoid drinking fluids for several hours before going to bed   Empty your bladder before going to bed   Men can keep a urinal at the bedside   © 7151-3175 [employer], [address] [place] [address], [place] [address]. All rights reserved. This information is not intended as a substitute for professional medical care. Always follow your healthcare  professional's instructions.  .WAYS TO HELP PREVENT INFECTION        WASH YOUR HANDS OFTEN DURING THE DAY, ESPECIALLY BEFORE YOU EAT, AFTER USING THE BATHROOM, AND AFTER TOUCHING ANIMALS    STAY AWAY FROM PEOPLE WHO HAVE ILLNESSES YOU CAN CATCH; SUCH AS COLDS, FLU, CHICKEN POX    TRY TO AVOID CROWDS    STAY AWAY FROM CHILDREN WHO RECENTLY HAVE RECEIVED LIVE VIRUS VACCINES    MAINTAIN GOOD MOUTH CARE    DO NOT SQUEEZE OR SCRATCH PIMPLES    CLEAN CUTS & SCRAPES RIGHT AWAY AND DAILY UNTIL HEALED WITH WARM WATER, SOAP & AN ANTISEPTIC    AVOID CONTACT WITH LITTER BOXES, BIRD CAGES, & FISH TANKS    AVOID STANDING WATER, IE., BIRD BATHS, FLOWER POTS/VASES, OR HUMIDIFIERS    WEAR GLOVES WHEN GARDENING OR CLEANING UP AFTER OTHERS, ESPECIALLY BABIES & SMALL CHILDREN    DO NOT EAT RAW FISH, SEAFOOD, MEAT, OR EGGS

## 2023-06-16 ENCOUNTER — OFFICE VISIT (OUTPATIENT)
Dept: PRIMARY CARE CLINIC | Facility: CLINIC | Age: 68
End: 2023-06-16
Payer: MEDICARE

## 2023-06-16 VITALS
HEART RATE: 78 BPM | DIASTOLIC BLOOD PRESSURE: 76 MMHG | WEIGHT: 168.88 LBS | BODY MASS INDEX: 31.08 KG/M2 | TEMPERATURE: 98 F | HEIGHT: 62 IN | SYSTOLIC BLOOD PRESSURE: 152 MMHG

## 2023-06-16 DIAGNOSIS — D63.1 ANEMIA ASSOCIATED WITH STAGE 4 CHRONIC RENAL FAILURE: Chronic | ICD-10-CM

## 2023-06-16 DIAGNOSIS — E53.8 B12 DEFICIENCY: ICD-10-CM

## 2023-06-16 DIAGNOSIS — I50.32 CHRONIC DIASTOLIC (CONGESTIVE) HEART FAILURE: ICD-10-CM

## 2023-06-16 DIAGNOSIS — N25.81 SECONDARY HYPERPARATHYROIDISM, RENAL: ICD-10-CM

## 2023-06-16 DIAGNOSIS — I27.9 PULMONARY HEART DISEASE: ICD-10-CM

## 2023-06-16 DIAGNOSIS — N18.4 CKD (CHRONIC KIDNEY DISEASE) STAGE 4, GFR 15-29 ML/MIN: Chronic | ICD-10-CM

## 2023-06-16 DIAGNOSIS — Z94.1 HEART TRANSPLANTED: Primary | Chronic | ICD-10-CM

## 2023-06-16 DIAGNOSIS — H65.91 RECURRENT SEROUS OTITIS MEDIA OF RIGHT EAR: ICD-10-CM

## 2023-06-16 DIAGNOSIS — N18.4 ANEMIA ASSOCIATED WITH STAGE 4 CHRONIC RENAL FAILURE: Chronic | ICD-10-CM

## 2023-06-16 DIAGNOSIS — E21.3 HYPERPARATHYROIDISM: ICD-10-CM

## 2023-06-16 DIAGNOSIS — E53.8 FOLATE DEFICIENCY: ICD-10-CM

## 2023-06-16 DIAGNOSIS — E03.8 SUBCLINICAL HYPOTHYROIDISM: ICD-10-CM

## 2023-06-16 PROBLEM — R10.11 RIGHT UPPER QUADRANT ABDOMINAL PAIN: Status: RESOLVED | Noted: 2023-01-04 | Resolved: 2023-06-16
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
## 2023-06-16 PROBLEM — D84.822 IMMUNODEFICIENCY SECONDARY TO RADIATION THERAPY: Status: RESOLVED | Noted: 2021-10-08 | Resolved: 2023-06-16

## 2023-06-16 PROBLEM — D69.6 THROMBOCYTOPENIA, UNSPECIFIED: Status: RESOLVED | Noted: 2021-11-29 | Resolved: 2023-06-16

## 2023-06-16 PROCEDURE — 3077F SYST BP >= 140 MM HG: CPT | Mod: CPTII,S$GLB,, | Performed by: INTERNAL MEDICINE

## 2023-06-16 PROCEDURE — 1160F RVW MEDS BY RX/DR IN RCRD: CPT | Mod: CPTII,S$GLB,, | Performed by: INTERNAL MEDICINE

## 2023-06-16 PROCEDURE — 1101F PT FALLS ASSESS-DOCD LE1/YR: CPT | Mod: CPTII,S$GLB,, | Performed by: INTERNAL MEDICINE

## 2023-06-16 PROCEDURE — 3078F DIAST BP <80 MM HG: CPT | Mod: CPTII,S$GLB,, | Performed by: INTERNAL MEDICINE

## 2023-06-16 PROCEDURE — 3078F PR MOST RECENT DIASTOLIC BLOOD PRESSURE < 80 MM HG: ICD-10-PCS | Mod: CPTII,S$GLB,, | Performed by: INTERNAL MEDICINE

## 2023-06-16 PROCEDURE — 99999 PR PBB SHADOW E&M-EST. PATIENT-LVL V: CPT | Mod: PBBFAC,,, | Performed by: INTERNAL MEDICINE

## 2023-06-16 PROCEDURE — 99999 PR PBB SHADOW E&M-EST. PATIENT-LVL V: ICD-10-PCS | Mod: PBBFAC,,, | Performed by: INTERNAL MEDICINE

## 2023-06-16 PROCEDURE — 1101F PR PT FALLS ASSESS DOC 0-1 FALLS W/OUT INJ PAST YR: ICD-10-PCS | Mod: CPTII,S$GLB,, | Performed by: INTERNAL MEDICINE

## 2023-06-16 PROCEDURE — 1159F PR MEDICATION LIST DOCUMENTED IN MEDICAL RECORD: ICD-10-PCS | Mod: CPTII,S$GLB,, | Performed by: INTERNAL MEDICINE

## 2023-06-16 PROCEDURE — 3008F BODY MASS INDEX DOCD: CPT | Mod: CPTII,S$GLB,, | Performed by: INTERNAL MEDICINE

## 2023-06-16 PROCEDURE — 1126F AMNT PAIN NOTED NONE PRSNT: CPT | Mod: CPTII,S$GLB,, | Performed by: INTERNAL MEDICINE

## 2023-06-16 PROCEDURE — 3288F PR FALLS RISK ASSESSMENT DOCUMENTED: ICD-10-PCS | Mod: CPTII,S$GLB,, | Performed by: INTERNAL MEDICINE

## 2023-06-16 PROCEDURE — 1126F PR PAIN SEVERITY QUANTIFIED, NO PAIN PRESENT: ICD-10-PCS | Mod: CPTII,S$GLB,, | Performed by: INTERNAL MEDICINE

## 2023-06-16 PROCEDURE — 3044F PR MOST RECENT HEMOGLOBIN A1C LEVEL <7.0%: ICD-10-PCS | Mod: CPTII,S$GLB,, | Performed by: INTERNAL MEDICINE

## 2023-06-16 PROCEDURE — 1157F ADVNC CARE PLAN IN RCRD: CPT | Mod: CPTII,S$GLB,, | Performed by: INTERNAL MEDICINE

## 2023-06-16 PROCEDURE — 3288F FALL RISK ASSESSMENT DOCD: CPT | Mod: CPTII,S$GLB,, | Performed by: INTERNAL MEDICINE

## 2023-06-16 PROCEDURE — 1157F PR ADVANCE CARE PLAN OR EQUIV PRESENT IN MEDICAL RECORD: ICD-10-PCS | Mod: CPTII,S$GLB,, | Performed by: INTERNAL MEDICINE

## 2023-06-16 PROCEDURE — 1160F PR REVIEW ALL MEDS BY PRESCRIBER/CLIN PHARMACIST DOCUMENTED: ICD-10-PCS | Mod: CPTII,S$GLB,, | Performed by: INTERNAL MEDICINE

## 2023-06-16 PROCEDURE — 1159F MED LIST DOCD IN RCRD: CPT | Mod: CPTII,S$GLB,, | Performed by: INTERNAL MEDICINE

## 2023-06-16 PROCEDURE — 3077F PR MOST RECENT SYSTOLIC BLOOD PRESSURE >= 140 MM HG: ICD-10-PCS | Mod: CPTII,S$GLB,, | Performed by: INTERNAL MEDICINE

## 2023-06-16 PROCEDURE — 99215 PR OFFICE/OUTPT VISIT, EST, LEVL V, 40-54 MIN: ICD-10-PCS | Mod: S$GLB,,, | Performed by: INTERNAL MEDICINE

## 2023-06-16 PROCEDURE — 3008F PR BODY MASS INDEX (BMI) DOCUMENTED: ICD-10-PCS | Mod: CPTII,S$GLB,, | Performed by: INTERNAL MEDICINE

## 2023-06-16 PROCEDURE — 3044F HG A1C LEVEL LT 7.0%: CPT | Mod: CPTII,S$GLB,, | Performed by: INTERNAL MEDICINE

## 2023-06-16 PROCEDURE — 99215 OFFICE O/P EST HI 40 MIN: CPT | Mod: S$GLB,,, | Performed by: INTERNAL MEDICINE

## 2023-06-16 RX ORDER — FLUTICASONE PROPIONATE 50 MCG
SPRAY, SUSPENSION (ML) NASAL
Qty: 16 G | Refills: 0 | Status: SHIPPED | OUTPATIENT
Start: 2023-06-16 | End: 2023-06-21 | Stop reason: SDUPTHER

## 2023-06-16 NOTE — ASSESSMENT & PLAN NOTE
Last echocardiogram normal diastolic function though BNP elevated, LE edema and c/o dyspnea on exertion - plan for repeat echocardiogram next week

## 2023-06-16 NOTE — PATIENT INSTRUCTIONS
Discuss possibly decreasing hydralazine w cardiac transplant team and/or your cardiologist (adding or increasing other anti-hypertensive medication if needed?)

## 2023-06-16 NOTE — PROGRESS NOTES
"Nadia Damon  06/16/2023  [id number]    Elis Wick MD  Patient Care Team:  Elsi Wick MD as PCP - General (Internal Medicine)  Miguel Soni Jr., MD as Consulting Physician (Vascular Surgery)  Ike King MD as Consulting Physician (Cardiology)  Courtney Tubbs MD as Consulting Physician (Cardiology)  Carter Crawford MD as Consulting Physician (Nephrology)  Paxton Vasques OD as Consulting Physician (Optometry)  PRANAY Villalobos MD as Obstetrician (Obstetrics)  Parker Mccarthy IV, MD (Urology)  Ike King MD as Consulting Physician (Cardiology)  Jose Roland MD as Consulting Physician (Dermatology)  Praasnth Johnson MD as Consulting Physician (Rheumatology)  Nora Morin LCSW as   Elis Wick MD as Physician (Internal Medicine)    Visit Type: Follow-up    Chief Complaint:  Chief Complaint   Patient presents with    4 week f/u      History of Present Illness: Ms. Damon is a 67 year old female here for scheduled f/u.    Ms. Daomn w h/o heart transplant 1993, on anti-rejection medication. 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 She was treated with 1 cycle of systemic chemotherapy cytoxan and taxotere and udenyca that was discontinued due to toxicity. She has been followed by radiation oncology and completed last radiation treatment 5/14/22. She is still followed by Heme/Onc for Epo, initiated for anemia due to stage 4 CKD.     Other medical issues include depresssion/anxiety/insomnia, hypertension, chronic diastolic CHF, and osteoporois for which she is receiving Prolia.     [employer] provided [employer] transportation today via "[employer]"  PT on hold while recovering from Co[employer] and now while waiting for car repair  Is better able to lift left arm from PT done so far    Mostly recovered from CoVid - chest still "a little tight" w exertion, gets tired easily    A little " dizzy when first up this morning - No falls  Home BP's 110's -120's/80's    B LE edema R leg > L    No data recorded     Hosp/ED/Urgent Care:  [facility]/2023 [facility] [place]    Recent appointments:   5/29/23 Heme/Onc Burk  5/18/23 O[facility]+ [place] f/u CoVid  5/03/23 O[facility]+ Wick CoVid    Upcoming appointments:  Future Appointments       Next 10 Appointments       Date Provider Specialty Appt Notes    6/19/2023  Cardiology Post ht    6/19/2023  Chemotherapy retacrit// ow//hm    6/20/2023  Lab Post ht    6/20/2023  Radiology Post ht    6/20/2023 Courtney Tubbs MD Transplant Post ht    6/26/2023  Lab burk    6/26/2023  Chemotherapy labs before poss retacrit// hm    6/29/2023 Gunner Melgar MD Nephrology CKD (chronic kidney disease) stage 4, GFR 15-29 ml/min    7/5/2023 PRANAY Villalobos MD Obstetrics and Gynecology Surgical Consult    7/6/2023  Lab               Displaying the next 10 appointments. This patient has additional appointments scheduled.           The following were reviewed: Active problem list, medication list, allergies, family history, social history, and Health Maintenance.     Medications have been reviewed and reconciled with patient at visit today.    Review of Systems   See HPI above    Exam: Initial VS /62 P 75 sat 98% Temp 97.9  Orthostatics BP  Pulse  Lying  141/82  80  Stand 1 min 143/76  78  Stand 3 min    152/76    Weight: 76.6 kg (168 lb 14.4 oz)   Body mass index is 30.89 kg/m².    BP Readings from Last 3 Encounters:   06/16/23 (!) 152/76   06/09/23 135/75   05/29/23 (!) 141/88      Wt Readings from Last 3 Encounters:   06/16/23 1307 76.6 kg (168 lb 14.4 oz)   05/29/23 1437 76.5 kg (168 lb 10.4 oz)   05/18/23 0816 76 kg (167 lb 8 oz)      Physical Exam  Constitutional:       General: She is not in acute distress.     Appearance: Normal appearance. She is obese. She is not ill-appearing.   HENT:      Head: Normocephalic and atraumatic.      Right Ear: Ear canal and external  ear normal. There is no impacted cerumen.      Left Ear: Tympanic membrane, ear canal and external ear normal. There is no impacted cerumen.      Ears:      Comments: R serous effusion     Nose: Nose normal.      Mouth/Throat:      Mouth: Mucous membranes are moist.      Pharynx: Oropharynx is clear.      Comments: Dentures in place  Eyes:      Extraocular Movements: Extraocular movements intact.      Conjunctiva/sclera: Conjunctivae normal.      Comments: glasses   Neck:      Vascular: No carotid bruit.   Cardiovascular:      Rate and Rhythm: Normal rate and regular rhythm.      Heart sounds: Normal heart sounds.   Pulmonary:      Effort: Pulmonary effort is normal.      Breath sounds: Normal breath sounds. No wheezing, rhonchi or rales.   Abdominal:      General: Bowel sounds are normal.      Palpations: Abdomen is soft.      Tenderness: There is no abdominal tenderness. There is no guarding.   Musculoskeletal:      Right lower leg: Edema present.      Left lower leg: Edema present.   Lymphadenopathy:      Cervical: No cervical adenopathy.   Skin:     General: Skin is warm and dry.   Neurological:      General: No focal deficit present.      Mental Status: She is alert and oriented to person, place, and time. Mental status is at baseline.      Coordination: Coordination normal.   Psychiatric:         Mood and Affect: Mood normal.         Behavior: Behavior normal.         Thought Content: Thought content normal.         Judgment: Judgment normal.      Laboratory Reviewed  Lab Results   Component Value Date    WBC 2.66 (L) 05/19/2023    HGB 8.6 (L) 05/19/2023    HCT 28.1 (L) 05/19/2023     05/19/2023    MCV 93 05/19/2023    CHOL 193 10/04/2022    TRIG 159 (H) 10/04/2022    HDL 46 10/04/2022    LDLCALC 115.2 10/04/2022    ALT 20 05/19/2023    AST 30 05/19/2023     (H) 05/19/2023    K 4.6 05/19/2023     (H) 05/19/2023    CREATININE 2.2 (H) 05/19/2023    BUN 34 (H) 05/19/2023    CO2 21 (L)  05/19/2023    MG 2.0 09/01/2022    TSH 8.908 (H) 03/08/2023    FREET4 0.93 03/08/2023    PSA <0.1 05/27/2008    INR 1.0 11/22/2021    HGBA1C 5.1 03/08/2023    CRP 19.0 (H) 09/30/2022     Lab Results   Component Value Date    .8 (H) 01/03/2023    CALCIUM 9.2 05/19/2023    CAION 1.13 09/05/2018    PHOS 4.8 (H) 03/08/2023    No results found for: [id number] results found for: FOLATE   Lab Results   Component Value Date    IRON 66 05/19/2023    TRANSFERRIN 194 (L) 05/19/2023    TIBC 287 05/19/2023    FESATURATED 23 05/19/2023      Lab Results   Component Value Date    EGFRNORACEVR 23.8 (A) 05/19/2023    ALBUMIN 3.4 (L) 05/19/2023     (H) 05/19/2023 5/03/23 POC Covid positive    Assessment:   67 y.o. female with multiple co-morbid illnesses here for continued follow up of medical problems.      The primary encounter diagnosis was Heart transplanted. Diagnoses of Hyperparathyroidism, Folate deficiency, B12 deficiency, Subclinical hypothyroidism, Recurrent serous otitis media of right ear, Chronic diastolic (congestive) heart failure, CKD (chronic kidney disease) stage 4, GFR 15-29 ml/min, Anemia associated with stage 4 chronic renal failure, Secondary hyperparathyroidism, renal, and Pulmonary heart disease were also pertinent to this visit.      Plan:   1. Heart transplanted  Overview:  5/93     Assessment & Plan:  Annual f/u w transplant team next week - 30 year anniversary!      2. Hyperparathyroidism  Assessment & Plan:  Secondary to advanced CKD - Cont intranasal calcitonin     Orders:  -     Vitamin D; Future; Expected date: 06/20/2023    3. Folate deficiency    4. B12 deficiency  -     Vitamin B12; Future; Expected date: 06/20/2023  -     Folate; Future; Expected date: 06/20/2023    5. Subclinical hypothyroidism  -     TSH; Future; Expected date: 06/20/2023  -     T4, Free; Future; Expected date: 06/20/2023    6. Recurrent serous otitis media of right ear  Assessment & Plan:  Intra-nasal steriod  x 4 weeks      7. Chronic diastolic (congestive) heart failure  Assessment & Plan:  Last echocardiogram normal diastolic function though BNP elevated, LE edema and c/o dyspnea on exertion - plan for repeat echocardiogram next week        8. CKD (chronic kidney disease) stage 4, GFR 15-29 ml/min  Overview:  US Retro   5/16/2018---Mild cortical thinning and increased cortical echogenicity.  Findings can be seen with medical renal disease.  8/31/216--- Echogenic kidneys with diffuse cortical thinning suggesting  medical renal disease. 2 complex right renal cortical cysts.    Assessment & Plan:  Cont close f/u renal function w Heme/Onc for anemia secondary to advanced CKD      9. Anemia associated with stage 4 chronic renal failure  Assessment & Plan:  F/u w Heme/Onc as scheduled next week       10. Secondary hyperparathyroidism, renal  Assessment & Plan:  Secondary to advanced CKD - Cont intranasal calcitonin       11. Pulmonary heart disease  Overview:  Echocardiogram 6/2022  Moderate tricuspid regurgitation.  Intermediate central venous pressure (8 mmHg).  The estimated PA systolic pressure is 42 mmHg.  There is pulmonary hypertension.  Intra atrial hypertrophy suspicious for lipoma      Assessment & Plan:  C/o dyspnea on exertion - mild elevation PAP last echo - F/u repeat echocardiogram scheduled for next week      Other orders  -     fluticasone propionate (FLONASE) 50 mcg/actuation nasal spray; One spray each nares daily x 4 weeks then as needed  Dispense: 16 g; Refill: 0         Health Maintenance         Date Due Completion Date    COVID-19 Vaccine (4 - Booster for Moderna series) 06/22/2023 4/27/2023    Lipid Panel 10/04/2023 10/4/2022    Pneumococcal Vaccines (Age 65+) (3 - PPSV23 if available, else PCV20) 10/22/2023 10/22/2018    Mammogram 04/10/2024 4/10/2023    DEXA Scan 01/25/2026 1/25/2023    Colorectal Cancer Screening 02/25/2026 2/25/2021    Hemoglobin A1c (Diabetic Prevention Screening) 03/08/2026  3/8/2023    TETANUS VACCINE 09/09/2030 9/9/2020          -Patient's lab results were reviewed and discussed with patient  -Treatment options and alternatives were discussed with the patient. Patient expressed understanding. Patient was given the opportunity to ask questions and be an active participant in their medical care. Patient had no further questions or concerns at this time.   -Patient is an overall moderate to HIGH risk for health complications from their medical conditions.     Follow up: July 20 for EAWV    After visit summary printed and given to patient upon discharge.  Patient care plan are included in After visit summary.    TOTAL TIME evaluating and managing this patient for this encounter was greater than 60 minutes. This time was spent personally by me on some of the following activities: review of patient's past medical history, assessing age-appropriate health maintenance needs, review of any interval history, review and interpretation of lab results, review and interpretation of imaging test results, review and interpretation of cardiology test results, reviewing consulting specialist notes, obtaining history from the patient and family, examination of the patient, medication reconciliation, managing and/or ordering prescription medications, ordering imaging tests, ordering referral to subspecialty provider(s), educating patient and answering their questions about diagnosis, treatment plan, and goals of treatment, discussing planned follow-up and final documentation of the visit. This time was exclusive of any separately billable procedures for this patient and exclusive of time spent treating any other patients.

## 2023-06-16 NOTE — ASSESSMENT & PLAN NOTE
C/o dyspnea on exertion - mild elevation PAP last echo - F/u repeat echocardiogram scheduled for next week

## 2023-06-19 ENCOUNTER — TELEPHONE (OUTPATIENT)
Dept: HEMATOLOGY/ONCOLOGY | Facility: CLINIC | Age: 68
End: 2023-06-19
Payer: MEDICARE

## 2023-06-19 ENCOUNTER — HOSPITAL ENCOUNTER (OUTPATIENT)
Dept: CARDIOLOGY | Facility: HOSPITAL | Age: 68
Discharge: HOME | End: 2023-06-19
Attending: INTERNAL MEDICINE
Payer: MEDICARE

## 2023-06-19 ENCOUNTER — INFUSION (OUTPATIENT)
Dept: INFUSION THERAPY | Facility: HOSPITAL | Age: 68
End: 2023-06-19
Attending: INTERNAL MEDICINE
Payer: MEDICARE

## 2023-06-19 VITALS
HEIGHT: 62 IN | RESPIRATION RATE: 18 BRPM | HEART RATE: 90 BPM | SYSTOLIC BLOOD PRESSURE: 119 MMHG | DIASTOLIC BLOOD PRESSURE: 75 MMHG | SYSTOLIC BLOOD PRESSURE: 135 MMHG | WEIGHT: 168 LBS | TEMPERATURE: 98 F | DIASTOLIC BLOOD PRESSURE: 76 MMHG | OXYGEN SATURATION: 98 % | BODY MASS INDEX: 30.91 KG/M2

## 2023-06-19 DIAGNOSIS — D63.1 ANEMIA ASSOCIATED WITH STAGE 4 CHRONIC RENAL FAILURE: Primary | ICD-10-CM

## 2023-06-19 DIAGNOSIS — N18.4 ANEMIA ASSOCIATED WITH STAGE 4 CHRONIC RENAL FAILURE: Primary | ICD-10-CM

## 2023-06-19 DIAGNOSIS — M81.8 OTHER OSTEOPOROSIS WITHOUT CURRENT PATHOLOGICAL FRACTURE: ICD-10-CM

## 2023-06-19 DIAGNOSIS — Z94.1 STATUS POST HEART TRANSPLANT: ICD-10-CM

## 2023-06-19 PROCEDURE — 93306 TTE W/DOPPLER COMPLETE: CPT

## 2023-06-19 PROCEDURE — 93306 TTE W/DOPPLER COMPLETE: CPT | Mod: 26,,, | Performed by: STUDENT IN AN ORGANIZED HEALTH CARE EDUCATION/TRAINING PROGRAM

## 2023-06-19 PROCEDURE — 96372 THER/PROPH/DIAG INJ SC/IM: CPT

## 2023-06-19 PROCEDURE — 93306 ECHO (CUPID ONLY): ICD-10-PCS | Mod: 26,,, | Performed by: STUDENT IN AN ORGANIZED HEALTH CARE EDUCATION/TRAINING PROGRAM

## 2023-06-19 PROCEDURE — 63600175 PHARM REV CODE 636 W HCPCS: Mod: JZ,JG

## 2023-06-19 RX ADMIN — EPOETIN ALFA-EPBX 20000 UNITS: 20000 INJECTION, SOLUTION INTRAVENOUS; SUBCUTANEOUS at 01:06

## 2023-06-19 NOTE — TELEPHONE ENCOUNTER
MELISSAR was consulted to assist patient with transportation on today. SWER called patient and introduced herself and explained her role. Pt reports transportation will pick her up and drop her off to [place] [address] for her appt. However, [facility]. SWER provided patient her contact number and reports to call once her appt is finished. SWER will schedule Lyft to bring patient to [facility]. SWER will remain available.

## 2023-06-20 ENCOUNTER — OFFICE VISIT (OUTPATIENT)
Dept: TRANSPLANT | Facility: CLINIC | Age: 68
End: 2023-06-20
Payer: MEDICARE

## 2023-06-20 ENCOUNTER — HOSPITAL ENCOUNTER (OUTPATIENT)
Dept: RADIOLOGY | Facility: HOSPITAL | Age: 68
Discharge: HOME | End: 2023-06-20
Attending: INTERNAL MEDICINE
Payer: MEDICARE

## 2023-06-20 ENCOUNTER — TELEPHONE (OUTPATIENT)
Dept: TRANSPLANT | Facility: CLINIC | Age: 68
End: 2023-06-20
Payer: MEDICARE

## 2023-06-20 ENCOUNTER — TELEPHONE (OUTPATIENT)
Dept: HEMATOLOGY/ONCOLOGY | Facility: CLINIC | Age: 68
End: 2023-06-20
Payer: MEDICARE

## 2023-06-20 VITALS
WEIGHT: 167.31 LBS | BODY MASS INDEX: 30.6 KG/M2 | HEART RATE: 100 BPM | DIASTOLIC BLOOD PRESSURE: 80 MMHG | OXYGEN SATURATION: 99 % | SYSTOLIC BLOOD PRESSURE: 150 MMHG

## 2023-06-20 DIAGNOSIS — Z79.899 IMMUNOSUPPRESSION DUE TO DRUG THERAPY: Chronic | ICD-10-CM

## 2023-06-20 DIAGNOSIS — N18.4 CKD (CHRONIC KIDNEY DISEASE) STAGE 4, GFR 15-29 ML/MIN: Chronic | ICD-10-CM

## 2023-06-20 DIAGNOSIS — Z94.1 HEART TRANSPLANTED: Primary | Chronic | ICD-10-CM

## 2023-06-20 DIAGNOSIS — I70.0 AORTIC ATHEROSCLEROSIS: ICD-10-CM

## 2023-06-20 DIAGNOSIS — I10 ESSENTIAL HYPERTENSION: Chronic | ICD-10-CM

## 2023-06-20 DIAGNOSIS — D84.821 IMMUNOSUPPRESSION DUE TO DRUG THERAPY: Chronic | ICD-10-CM

## 2023-06-20 DIAGNOSIS — I50.32 CHRONIC DIASTOLIC (CONGESTIVE) HEART FAILURE: ICD-10-CM

## 2023-06-20 DIAGNOSIS — Z94.1 HEART TRANSPLANTED: ICD-10-CM

## 2023-06-20 PROCEDURE — 3044F PR MOST RECENT HEMOGLOBIN A1C LEVEL <7.0%: ICD-10-PCS | Mod: HCNC,CPTII,S$GLB, | Performed by: INTERNAL MEDICINE

## 2023-06-20 PROCEDURE — 1159F MED LIST DOCD IN RCRD: CPT | Mod: HCNC,CPTII,S$GLB, | Performed by: INTERNAL MEDICINE

## 2023-06-20 PROCEDURE — 1157F PR ADVANCE CARE PLAN OR EQUIV PRESENT IN MEDICAL RECORD: ICD-10-PCS | Mod: HCNC,CPTII,S$GLB, | Performed by: INTERNAL MEDICINE

## 2023-06-20 PROCEDURE — 3008F BODY MASS INDEX DOCD: CPT | Mod: HCNC,CPTII,S$GLB, | Performed by: INTERNAL MEDICINE

## 2023-06-20 PROCEDURE — 1126F AMNT PAIN NOTED NONE PRSNT: CPT | Mod: HCNC,CPTII,S$GLB, | Performed by: INTERNAL MEDICINE

## 2023-06-20 PROCEDURE — 99999 PR PBB SHADOW E&M-EST. PATIENT-LVL III: CPT | Mod: PBBFAC,HCNC,, | Performed by: INTERNAL MEDICINE

## 2023-06-20 PROCEDURE — 71046 XR CHEST PA AND LATERAL: ICD-10-PCS | Mod: 26,,, | Performed by: RADIOLOGY

## 2023-06-20 PROCEDURE — 1159F PR MEDICATION LIST DOCUMENTED IN MEDICAL RECORD: ICD-10-PCS | Mod: HCNC,CPTII,S$GLB, | Performed by: INTERNAL MEDICINE

## 2023-06-20 PROCEDURE — 3008F PR BODY MASS INDEX (BMI) DOCUMENTED: ICD-10-PCS | Mod: HCNC,CPTII,S$GLB, | Performed by: INTERNAL MEDICINE

## 2023-06-20 PROCEDURE — 1160F RVW MEDS BY RX/DR IN RCRD: CPT | Mod: HCNC,CPTII,S$GLB, | Performed by: INTERNAL MEDICINE

## 2023-06-20 PROCEDURE — 3044F HG A1C LEVEL LT 7.0%: CPT | Mod: HCNC,CPTII,S$GLB, | Performed by: INTERNAL MEDICINE

## 2023-06-20 PROCEDURE — 99214 OFFICE O/P EST MOD 30 MIN: CPT | Mod: HCNC,S$GLB,, | Performed by: INTERNAL MEDICINE

## 2023-06-20 PROCEDURE — 99999 PR PBB SHADOW E&M-EST. PATIENT-LVL III: ICD-10-PCS | Mod: PBBFAC,HCNC,, | Performed by: INTERNAL MEDICINE

## 2023-06-20 PROCEDURE — 71046 X-RAY EXAM CHEST 2 VIEWS: CPT | Mod: TC

## 2023-06-20 PROCEDURE — 1157F ADVNC CARE PLAN IN RCRD: CPT | Mod: HCNC,CPTII,S$GLB, | Performed by: INTERNAL MEDICINE

## 2023-06-20 PROCEDURE — 3077F PR MOST RECENT SYSTOLIC BLOOD PRESSURE >= 140 MM HG: ICD-10-PCS | Mod: HCNC,CPTII,S$GLB, | Performed by: INTERNAL MEDICINE

## 2023-06-20 PROCEDURE — 3077F SYST BP >= 140 MM HG: CPT | Mod: HCNC,CPTII,S$GLB, | Performed by: INTERNAL MEDICINE

## 2023-06-20 PROCEDURE — 3066F NEPHROPATHY DOC TX: CPT | Mod: HCNC,CPTII,S$GLB, | Performed by: INTERNAL MEDICINE

## 2023-06-20 PROCEDURE — 1126F PR PAIN SEVERITY QUANTIFIED, NO PAIN PRESENT: ICD-10-PCS | Mod: HCNC,CPTII,S$GLB, | Performed by: INTERNAL MEDICINE

## 2023-06-20 PROCEDURE — 3066F PR DOCUMENTATION OF TREATMENT FOR NEPHROPATHY: ICD-10-PCS | Mod: HCNC,CPTII,S$GLB, | Performed by: INTERNAL MEDICINE

## 2023-06-20 PROCEDURE — 99214 PR OFFICE/OUTPT VISIT, EST, LEVL IV, 30-39 MIN: ICD-10-PCS | Mod: HCNC,S$GLB,, | Performed by: INTERNAL MEDICINE

## 2023-06-20 PROCEDURE — 1160F PR REVIEW ALL MEDS BY PRESCRIBER/CLIN PHARMACIST DOCUMENTED: ICD-10-PCS | Mod: HCNC,CPTII,S$GLB, | Performed by: INTERNAL MEDICINE

## 2023-06-20 PROCEDURE — 99499 UNLISTED E&M SERVICE: CPT | Mod: HCNC,S$GLB,, | Performed by: INTERNAL MEDICINE

## 2023-06-20 PROCEDURE — 71046 X-RAY EXAM CHEST 2 VIEWS: CPT | Mod: 26,,, | Performed by: RADIOLOGY

## 2023-06-20 PROCEDURE — 3079F DIAST BP 80-89 MM HG: CPT | Mod: HCNC,CPTII,S$GLB, | Performed by: INTERNAL MEDICINE

## 2023-06-20 PROCEDURE — 3079F PR MOST RECENT DIASTOLIC BLOOD PRESSURE 80-89 MM HG: ICD-10-PCS | Mod: HCNC,CPTII,S$GLB, | Performed by: INTERNAL MEDICINE

## 2023-06-20 RX ORDER — NIFEDIPINE 30 MG/1
30 TABLET, EXTENDED RELEASE ORAL DAILY
Qty: 30 TABLET | Refills: 11 | Status: SHIPPED | OUTPATIENT
Start: 2023-06-20 | End: 2024-06-19

## 2023-06-20 NOTE — PROGRESS NOTES
Subjective:   Ms. Damon is a 67 y.o. year old Black or  female who received a  heart transplant on 5/27/93.          HPI  Ms. Damon is a very pleasant 66 y/o AAF s/p OHTx 5/27/93 at [facility], s/p PPM same date, mild anemia and leukopenia, OA, cervical spine DJD with radiculopathy and chronic neck pain who presents for her 30th post annual transplant evaluation. Three years ago after her annual was found to have BRCA, underwent excision, chemo/radiation, had a rough time of it, with some complications related to it,however recovered well.     Today is feeling great, states she has been ahving some increased DONOHUE but overall feels great. Patient denies N/V/F/C, lightheadedness, dizziness, PND, orthopnea, LE edema, abdominal pain, abdominal pressure, chest pain, chest pressure, syncope, pre-syncope.    Echo today:  The left ventricle is normal in size with concentric hypertrophy and normal systolic function.  Indeterminate left ventricular diastolic function.  The estimated PA systolic pressure is 37 mmHg.  Normal right ventricular size with normal right ventricular systolic function.  Normal central venous pressure (3 mmHg).  The estimated ejection fraction is 60%.  Moderate tricuspid regurgitation.  Mild pulmonic regurgitation.        Cxr: Comparison is made to January 4, 2023.  Electrical lead overlies the lower chest and upper abdomen.  Surgical clips overlie the upper abdomen.  Mediastinal silhouette is prominent.  The lungs are clear.  No pneumothorax or significant pleural effusion    DSE 05/24/21:  The left ventricle is normal in size with concentric remodeling and normal systolic function.  The patient reached the end of the protocol.  There were no arrhythmias during stress.  Severe left atrial enlargement.  The estimated ejection fraction is 55%.  Grade II left ventricular diastolic dysfunction.  Normal right ventricular size with normal right ventricular systolic  function.  Mild-to-moderate mitral regurgitation.  Mild to moderate tricuspid regurgitation.  Normal central venous pressure (3 mmHg).  The estimated PA systolic pressure is 39 mmHg.  The stress echo portion of this study is negative for myocardial ischemia.  Severe left atrial enlargement.  The ECG portion of this study is negative for myocardial ischemia.       Review of Systems   Constitutional: Negative. Negative for chills, decreased appetite, diaphoresis, fever, malaise/fatigue, night sweats, weight gain and weight loss.   Eyes: Negative.  Negative for visual disturbance.   Cardiovascular:  Positive for leg swelling (wears compression socks). Negative for chest pain, claudication, cyanosis, dyspnea on exertion, irregular heartbeat, near-syncope, orthopnea, palpitations, paroxysmal nocturnal dyspnea and syncope.   Respiratory:  Negative for cough, hemoptysis, shortness of breath, sleep disturbances due to breathing, snoring, sputum production and wheezing.    Endocrine: Negative.    Hematologic/Lymphatic: Negative.  Negative for adenopathy and bleeding problem. Does not bruise/bleed easily.   Skin:  Negative for color change, dry skin, nail changes, poor wound healing, rash, skin cancer and suspicious lesions.   Musculoskeletal: Negative.  Negative for back pain, falls, gout, joint pain and muscle weakness.   Gastrointestinal: Negative.  Negative for bloating, abdominal pain, anorexia, constipation, diarrhea, heartburn, hematemesis, hematochezia, hemorrhoids, melena, nausea and vomiting.   Genitourinary: Negative.  Negative for nocturia.   Neurological:  Negative for excessive daytime sleepiness, dizziness, focal weakness, headaches, light-headedness, paresthesias, tremors and weakness.   Psychiatric/Behavioral:  Negative for depression and memory loss. The patient does not have insomnia and is not nervous/anxious.      Objective:   Blood pressure (!) 150/80, pulse 100, weight 75.9 kg (167 lb 5.3 oz), last  menstrual period 06/20/1983, SpO2 99 %.body mass index is 30.6 kg/m².    Physical Exam  Vitals and nursing note reviewed.   Constitutional:       General: She is not in acute distress.     Appearance: She is well-developed. She is not diaphoretic.   HENT:      Head: Normocephalic and atraumatic.      Mouth/Throat:      Pharynx: No oropharyngeal exudate.   Eyes:      General: No scleral icterus.     Conjunctiva/sclera: Conjunctivae normal.      Pupils: Pupils are equal, round, and reactive to light.   Neck:      Vascular: No JVD.   Cardiovascular:      Rate and Rhythm: Regular rhythm. Tachycardia present.      Chest Wall: PMI is not displaced.      Heart sounds: Normal heart sounds. No murmur heard.    No friction rub. No gallop.   Pulmonary:      Effort: Pulmonary effort is normal. No respiratory distress.      Breath sounds: Normal breath sounds. No wheezing or rales.   Chest:      Chest wall: No tenderness.   Abdominal:      General: Bowel sounds are normal. There is no distension.      Palpations: Abdomen is soft. There is no mass.      Tenderness: There is no abdominal tenderness. There is no guarding or rebound.   Musculoskeletal:         General: No tenderness. Normal range of motion.      Cervical back: Normal range of motion and neck supple.   Skin:     General: Skin is warm and dry.      Coloration: Skin is not pale.      Findings: No erythema or rash.   Neurological:      Mental Status: She is alert and oriented to person, place, and time.   Psychiatric:         Behavior: Behavior normal.         Thought Content: Thought content normal.         Judgment: Judgment normal.       Lab Results   Component Value Date    WBC 2.85 (L) 07/06/2023    HGB 9.2 (L) 07/06/2023    HCT 30.0 (L) 07/06/2023    MCV 91 07/06/2023     07/06/2023    CO2 21 (L) 07/06/2023    CREATININE 2.4 (H) 07/06/2023    CALCIUM 8.4 (L) 07/06/2023    ALBUMIN 3.3 (L) 07/06/2023    AST 32 07/06/2023     (H) 06/20/2023    ALT 21  07/06/2023    .8 (H) 01/03/2023       Lab Results   Component Value Date    INR 1.0 11/22/2021    INR 1.0 11/10/2020    INR 1.0 01/07/2018       BNP   Date Value Ref Range Status   06/20/2023 285 (H) 0 - 99 pg/mL Final     Comment:     Values of less than 100 pg/ml are consistent with non-CHF populations.   05/19/2023 340 (H) 0 - 99 pg/mL Final     Comment:     Values of less than 100 pg/ml are consistent with non-CHF populations.   10/04/2022 259 (H) 0 - 99 pg/mL Final     Comment:     Values of less than 100 pg/ml are consistent with non-CHF populations.       LD   Date Value Ref Range Status   08/02/2007 315 (H) 110 - 260 U/L Final     No results found for: SIROLIMUS  Cyclosporine, LC/MS   Date Value Ref Range Status   06/20/2023 96 (L) 100 - 400 ng/mL Final     Comment:     Reference Normals:  For Kidney Transplants: 100-300 ng/mL    Testing performed by Liquid Chromatography-Tandem  Mass Spectrometry (LC-MS/MS).    This test was developed and its performance characteristics  determined by [facility], Department of Pathology  and Laboratory Medicine in a manner consistent with CLIA  requirements. It has not been cleared or approved by the [place]  Food and Drug Administration.  This test is used for clinical  purposes.  It should not be regarded as investigational or for  research.         Assessment:     1. Heart transplanted    2. Essential hypertension    3. Aortic atherosclerosis    4. Chronic diastolic (congestive) heart failure    5. CKD (chronic kidney disease) stage 4, GFR 15-29 ml/min    6. Immunosuppression due to drug therapy        Plan:   Patient doing well30 years post OHt. Feel great overall.  Glad to see her doing so well  Ldl goal less than 70, pending lab today.   Blood pressure up today but at home is running within normal range. Will let us know if this changes.   Consider nephrology followup but CKD seems stable based on creatinine. Followed by Yvette carmona at [facility].  Aortic atherosclerosis/hyperlipidemia- had been on statin but did not tolerate. Will followup on labs and discuss with coordinators.     Return instructions as set forth by post transplant schedule or as needed:    Clinic: Return for labs and/or biopsy weekly the first month, every two weeks during month 2 and then monthly for the first year at the provider or coordinator's discretion. During the second year, return to clinic every 3 months. Post transplant year 3-5 return every 6 months. There will be a comprehensive post transplant evaluation every year that may include LHC/RHC/biopsy, stress test, echo, CXR, and other health screening exams.    In addition to the clinical assessment, I have ordered Allomap testing for this patient to assist in identification of moderate/severe acute cellular rejection (ACR) in a pt with stable Allograft function instead of endomyocardial biopsy.     Patient is reminded to call with any health changes as these can be early signs of transplant complications. Patient is advised to make sure any new medications or changes of old medications are discussed with a pharmacist or physician knowledgeable with transplant to avoid rejection/drug toxicity related to significant drug interactions.    UNOS Patient Status  Functional Status: 80% - Normal activity with effort: some symptoms of disease  Physical Capacity: No Limitations  Working for Income: No  If no, reason not working: Demands of Treatment    Courtney Tubbs MD

## 2023-06-20 NOTE — TELEPHONE ENCOUNTER
Swer received call from pt. Pt. reports worry over missing appts d/t transportation today. Pt. reports [employer] transportation through insurance has stalled on pt multiple times today. Pt. appts had to be re-scheduled for later today and pt. reports provider is from out of town traveling to . Pt. reports no other transportation assistance is available to her. Swer scheduled pt. transportation through [employer] to drop off pt. at [place]. Pt. to return swer call for return ride home. Swer will remain available.

## 2023-06-21 ENCOUNTER — TELEPHONE (OUTPATIENT)
Dept: HEMATOLOGY/ONCOLOGY | Facility: CLINIC | Age: 68
End: 2023-06-21
Payer: MEDICARE

## 2023-06-21 LAB
AORTIC ROOT ANNULUS: 2.97 CM
ASCENDING AORTA: 3.3 CM
AV INDEX (PROSTH): 0.91
AV MEAN GRADIENT: 3 MMHG
AV PEAK GRADIENT: 5 MMHG
AV VALVE AREA: 2.64 CM2
AV VELOCITY RATIO: 0.95
BSA FOR ECHO PROCEDURE: 1.83 M2
CV ECHO LV RWT: 0.79 CM
DOP CALC AO PEAK VEL: 1.09 M/S
DOP CALC AO VTI: 23.5 CM
DOP CALC LVOT AREA: 2.9 CM2
DOP CALC LVOT DIAMETER: 1.92 CM
DOP CALC LVOT PEAK VEL: 1.04 M/S
DOP CALC LVOT STROKE VOLUME: 61.93 CM3
DOP CALC RVOT PEAK VEL: 0.54 M/S
DOP CALC RVOT VTI: 9.9 CM
DOP CALCLVOT PEAK VEL VTI: 21.4 CM
E WAVE DECELERATION TIME: 170.63 MSEC
E/A RATIO: 1.3
E/E' RATIO: 7.8 M/S
ECHO LV POSTERIOR WALL: 1.43 CM (ref 0.6–1.1)
EJECTION FRACTION: 60 %
FRACTIONAL SHORTENING: 36 % (ref 28–44)
INTERVENTRICULAR SEPTUM: 1.3 CM (ref 0.6–1.1)
IVC DIAMETER: 1.58 CM
IVRT: 82.78 MSEC
LA MAJOR: 7.02 CM
LA MINOR: 7.3 CM
LA WIDTH: 4 CM
LEFT ATRIUM SIZE: 3.77 CM
LEFT ATRIUM VOLUME INDEX MOD: 45.2 ML/M2
LEFT ATRIUM VOLUME INDEX: 51.8 ML/M2
LEFT ATRIUM VOLUME MOD: 80.02 CM3
LEFT ATRIUM VOLUME: 91.74 CM3
LEFT INTERNAL DIMENSION IN SYSTOLE: 2.34 CM (ref 2.1–4)
LEFT VENTRICLE DIASTOLIC VOLUME INDEX: 31.41 ML/M2
LEFT VENTRICLE DIASTOLIC VOLUME: 55.59 ML
LEFT VENTRICLE MASS INDEX: 99 G/M2
LEFT VENTRICLE SYSTOLIC VOLUME INDEX: 10.7 ML/M2
LEFT VENTRICLE SYSTOLIC VOLUME: 18.98 ML
LEFT VENTRICULAR INTERNAL DIMENSION IN DIASTOLE: 3.63 CM (ref 3.5–6)
LEFT VENTRICULAR MASS: 174.84 G
LV LATERAL E/E' RATIO: 6 M/S
LV SEPTAL E/E' RATIO: 11.14 M/S
LVOT MG: 2.52 MMHG
LVOT MV: 0.75 CM/S
MV PEAK A VEL: 0.6 M/S
MV PEAK E VEL: 0.78 M/S
MV STENOSIS PRESSURE HALF TIME: 49.48 MS
MV VALVE AREA P 1/2 METHOD: 4.45 CM2
PISA TR MAX VEL: 2.92 M/S
PULM VEIN S/D RATIO: 0.58
PV MEAN GRADIENT: 0.8 MMHG
PV MV: 0.43 M/S
PV PEAK D VEL: 0.53 M/S
PV PEAK S VEL: 0.31 M/S
PV PEAK VELOCITY: 0.65 CM/S
RA MAJOR: 6.24 CM
RA PRESSURE: 3 MMHG
RA WIDTH: 3.63 CM
RIGHT VENTRICULAR END-DIASTOLIC DIMENSION: 4.23 CM
SINUS: 3.26 CM
STJ: 3.31 CM
TDI LATERAL: 0.13 M/S
TDI SEPTAL: 0.07 M/S
TDI: 0.1 M/S
TR MAX PG: 34 MMHG
TRICUSPID ANNULAR PLANE SYSTOLIC EXCURSION: 1.56 CM
TV REST PULMONARY ARTERY PRESSURE: 37 MMHG

## 2023-06-21 NOTE — TELEPHONE ENCOUNTER
Swer received call from pt. requesting transportation assistance for 6/26 appt times. Swer schedule pt. transportation through [employer]. Pt. had no other needs today. Swer will remain available.

## 2023-06-22 RX ORDER — ALBUTEROL SULFATE 90 UG/1
2 AEROSOL, METERED RESPIRATORY (INHALATION) EVERY 4 HOURS PRN
Qty: 18 G | Refills: 3 | Status: ON HOLD | OUTPATIENT
Start: 2023-06-22 | End: 2023-08-11 | Stop reason: HOSPADM

## 2023-06-22 RX ORDER — FLUTICASONE PROPIONATE 50 MCG
SPRAY, SUSPENSION (ML) NASAL
Qty: 16 G | Refills: 0 | Status: ON HOLD | OUTPATIENT
Start: 2023-06-22 | End: 2023-08-11 | Stop reason: HOSPADM

## 2023-06-26 ENCOUNTER — INFUSION (OUTPATIENT)
Dept: INFUSION THERAPY | Facility: HOSPITAL | Age: 68
End: 2023-06-26
Attending: INTERNAL MEDICINE
Payer: MEDICARE

## 2023-06-26 ENCOUNTER — DOCUMENTATION ONLY (OUTPATIENT)
Dept: HEMATOLOGY/ONCOLOGY | Facility: CLINIC | Age: 68
End: 2023-06-26
Payer: MEDICARE

## 2023-06-26 ENCOUNTER — TELEPHONE (OUTPATIENT)
Dept: HEMATOLOGY/ONCOLOGY | Facility: CLINIC | Age: 68
End: 2023-06-26
Payer: MEDICARE

## 2023-06-26 VITALS
HEART RATE: 95 BPM | DIASTOLIC BLOOD PRESSURE: 67 MMHG | OXYGEN SATURATION: 98 % | TEMPERATURE: 99 F | RESPIRATION RATE: 18 BRPM | SYSTOLIC BLOOD PRESSURE: 143 MMHG

## 2023-06-26 DIAGNOSIS — D63.1 ANEMIA ASSOCIATED WITH STAGE 4 CHRONIC RENAL FAILURE: Primary | ICD-10-CM

## 2023-06-26 DIAGNOSIS — M81.8 OTHER OSTEOPOROSIS WITHOUT CURRENT PATHOLOGICAL FRACTURE: ICD-10-CM

## 2023-06-26 DIAGNOSIS — N18.4 ANEMIA ASSOCIATED WITH STAGE 4 CHRONIC RENAL FAILURE: Primary | ICD-10-CM

## 2023-06-26 PROCEDURE — 63600175 PHARM REV CODE 636 W HCPCS: Mod: JZ,JG,HCNC,EC

## 2023-06-26 PROCEDURE — 96372 THER/PROPH/DIAG INJ SC/IM: CPT | Mod: HCNC

## 2023-06-26 RX ADMIN — EPOETIN ALFA-EPBX 20000 UNITS: 10000 INJECTION, SOLUTION INTRAVENOUS; SUBCUTANEOUS at 01:06

## 2023-06-26 NOTE — TELEPHONE ENCOUNTER
Guilherme received call from pt. requesting transportation for 6/29 appt time. Pt. reports she doesn't believe her car will be ready in time for her appt. Kathyr scheduled pt. transportation through [employer]. Pt. to return swer call if anything changes. Swer will remain available.

## 2023-06-26 NOTE — NURSING
Patient arrived today for epo injection after lab. Patient verbalized she is depressed and rated distress screening 10/10. Patient expressed she is of no harm to herself or others. Patient is tearful and expresses concern about son who lives out of state. HIMA Webster contacted.      1330 HIMA Webster, at patient side. 1332 Epo administered as documented on MAR using aseptic technique. Patient tolerated well. Band aid applied to site. Patient remains in chair to speak with .

## 2023-06-26 NOTE — DISCHARGE INSTRUCTIONS
[facility]  [address] [address]  [address]  [phone] phone     [phone] fax  Hours of Operation: Monday- Friday 8:00am- 5:00pm  After hours phone  [phone]  Hematology / Oncology Physicians on call      SALENA Penning[address] Dr[address] Dr., NP Sydney Prescott, ONELIA Macias FNP    Please call with any concerns regarding your appointment today.

## 2023-06-26 NOTE — PROGRESS NOTES
"Swer was notified by infusion staff, pt. distress score 10/10 today. Pt. reporting recent family issues that are upsetting. Swer met with pt. at chairside. Pt. was observed sitting alone, tearful. Pt. states "I've got family problems." Swer offered supportive listening. Pt. discussed worry over her son who is currently struggling with alcoholism. Pt. reports having little contact with her son, as he chooses to not have contact with pt. despite pt.'s attempts to reach out. Pt. reports family recently flew down to see pt. and pt. was provided upsetting news yesterday that pt.'s son isn't doing well. Swer processed some of this news with pt. today. Pt. reports she is unsure if pt. is a harm to himself or others, although swer discussed steps for pt. to take in the event this is the case. Pt. reports son has refused help to address addiction. Pt. discussed family generations of struggles with alcoholism. Swer offered emotional support. Pt. reports leaning on prayer during this difficult time. Swer reviewed supports that may be beneficial for pt. including counseling. Pt. reports interest in this but reports to think about this further before making a final decision. Swer offered pt. a journal for managing thoughts that are stressful and worrisome. Pt. was observed hugging the journal tightly to her chest and expressed gratitude. Swer encouraged pt. to take time today and the days ahead for self care. Pt. reports looking forward to returning home today to a warm meal with time for rest. Swer encouraged pt. to call if further support could be provided. Swer assisted with pt. return ride home today. Swer to continue to offer pt. support.     DISTRESS SCREENING 6/26/2023 5/29/2023 2/8/2023 12/5/2022 11/7/2022 8/15/2022 6/14/2022   Distress Score 10 - Extreme Distress 0 - No Distress 0 - No Distress 0 - No Distress 0 - No Distress 0 - No Distress 0   Practical Problems None of these None of these None of these - None of " these None of these -   Family Problems Dealing with Children None of these None of these - None of these None of these -   Emotional Problems Depression;Fears;Nervousness;Sadness;Worry;Loss of Interest None of these None of these - None of these None of these -   Spiritual / Mandaeism Concerns No No No - No No No   Physical Problems - None of these None of these - None of these None of these -

## 2023-06-29 ENCOUNTER — OFFICE VISIT (OUTPATIENT)
Dept: NEPHROLOGY | Facility: CLINIC | Age: 68
End: 2023-06-29
Payer: MEDICARE

## 2023-06-29 ENCOUNTER — TELEPHONE (OUTPATIENT)
Dept: NEPHROLOGY | Facility: CLINIC | Age: 68
End: 2023-06-29
Payer: MEDICARE

## 2023-06-29 VITALS
SYSTOLIC BLOOD PRESSURE: 142 MMHG | BODY MASS INDEX: 30.71 KG/M2 | WEIGHT: 166.88 LBS | HEART RATE: 92 BPM | HEIGHT: 62 IN | DIASTOLIC BLOOD PRESSURE: 82 MMHG

## 2023-06-29 DIAGNOSIS — N18.4 CKD (CHRONIC KIDNEY DISEASE) STAGE 4, GFR 15-29 ML/MIN: Primary | ICD-10-CM

## 2023-06-29 DIAGNOSIS — D84.9 IMMUNOSUPPRESSION: ICD-10-CM

## 2023-06-29 DIAGNOSIS — I10 PRIMARY HYPERTENSION: ICD-10-CM

## 2023-06-29 PROCEDURE — 1101F PT FALLS ASSESS-DOCD LE1/YR: CPT | Mod: HCNC,CPTII,S$GLB, | Performed by: INTERNAL MEDICINE

## 2023-06-29 PROCEDURE — 1159F MED LIST DOCD IN RCRD: CPT | Mod: HCNC,CPTII,S$GLB, | Performed by: INTERNAL MEDICINE

## 2023-06-29 PROCEDURE — 3288F PR FALLS RISK ASSESSMENT DOCUMENTED: ICD-10-PCS | Mod: HCNC,CPTII,S$GLB, | Performed by: INTERNAL MEDICINE

## 2023-06-29 PROCEDURE — 3066F NEPHROPATHY DOC TX: CPT | Mod: HCNC,CPTII,S$GLB, | Performed by: INTERNAL MEDICINE

## 2023-06-29 PROCEDURE — 3288F FALL RISK ASSESSMENT DOCD: CPT | Mod: HCNC,CPTII,S$GLB, | Performed by: INTERNAL MEDICINE

## 2023-06-29 PROCEDURE — 99999 PR PBB SHADOW E&M-EST. PATIENT-LVL IV: ICD-10-PCS | Mod: PBBFAC,HCNC,, | Performed by: INTERNAL MEDICINE

## 2023-06-29 PROCEDURE — 1157F PR ADVANCE CARE PLAN OR EQUIV PRESENT IN MEDICAL RECORD: ICD-10-PCS | Mod: HCNC,CPTII,S$GLB, | Performed by: INTERNAL MEDICINE

## 2023-06-29 PROCEDURE — 99214 OFFICE O/P EST MOD 30 MIN: CPT | Mod: HCNC,S$GLB,, | Performed by: INTERNAL MEDICINE

## 2023-06-29 PROCEDURE — 1159F PR MEDICATION LIST DOCUMENTED IN MEDICAL RECORD: ICD-10-PCS | Mod: HCNC,CPTII,S$GLB, | Performed by: INTERNAL MEDICINE

## 2023-06-29 PROCEDURE — 99214 PR OFFICE/OUTPT VISIT, EST, LEVL IV, 30-39 MIN: ICD-10-PCS | Mod: HCNC,S$GLB,, | Performed by: INTERNAL MEDICINE

## 2023-06-29 PROCEDURE — 99999 PR PBB SHADOW E&M-EST. PATIENT-LVL IV: CPT | Mod: PBBFAC,HCNC,, | Performed by: INTERNAL MEDICINE

## 2023-06-29 PROCEDURE — 3079F PR MOST RECENT DIASTOLIC BLOOD PRESSURE 80-89 MM HG: ICD-10-PCS | Mod: HCNC,CPTII,S$GLB, | Performed by: INTERNAL MEDICINE

## 2023-06-29 PROCEDURE — 3008F PR BODY MASS INDEX (BMI) DOCUMENTED: ICD-10-PCS | Mod: HCNC,CPTII,S$GLB, | Performed by: INTERNAL MEDICINE

## 2023-06-29 PROCEDURE — 3077F SYST BP >= 140 MM HG: CPT | Mod: HCNC,CPTII,S$GLB, | Performed by: INTERNAL MEDICINE

## 2023-06-29 PROCEDURE — 1160F PR REVIEW ALL MEDS BY PRESCRIBER/CLIN PHARMACIST DOCUMENTED: ICD-10-PCS | Mod: HCNC,CPTII,S$GLB, | Performed by: INTERNAL MEDICINE

## 2023-06-29 PROCEDURE — 1157F ADVNC CARE PLAN IN RCRD: CPT | Mod: HCNC,CPTII,S$GLB, | Performed by: INTERNAL MEDICINE

## 2023-06-29 PROCEDURE — 1160F RVW MEDS BY RX/DR IN RCRD: CPT | Mod: HCNC,CPTII,S$GLB, | Performed by: INTERNAL MEDICINE

## 2023-06-29 PROCEDURE — 1126F AMNT PAIN NOTED NONE PRSNT: CPT | Mod: HCNC,CPTII,S$GLB, | Performed by: INTERNAL MEDICINE

## 2023-06-29 PROCEDURE — 3066F PR DOCUMENTATION OF TREATMENT FOR NEPHROPATHY: ICD-10-PCS | Mod: HCNC,CPTII,S$GLB, | Performed by: INTERNAL MEDICINE

## 2023-06-29 PROCEDURE — 3079F DIAST BP 80-89 MM HG: CPT | Mod: HCNC,CPTII,S$GLB, | Performed by: INTERNAL MEDICINE

## 2023-06-29 PROCEDURE — 3044F HG A1C LEVEL LT 7.0%: CPT | Mod: HCNC,CPTII,S$GLB, | Performed by: INTERNAL MEDICINE

## 2023-06-29 PROCEDURE — 3077F PR MOST RECENT SYSTOLIC BLOOD PRESSURE >= 140 MM HG: ICD-10-PCS | Mod: HCNC,CPTII,S$GLB, | Performed by: INTERNAL MEDICINE

## 2023-06-29 PROCEDURE — 1101F PR PT FALLS ASSESS DOC 0-1 FALLS W/OUT INJ PAST YR: ICD-10-PCS | Mod: HCNC,CPTII,S$GLB, | Performed by: INTERNAL MEDICINE

## 2023-06-29 PROCEDURE — 1126F PR PAIN SEVERITY QUANTIFIED, NO PAIN PRESENT: ICD-10-PCS | Mod: HCNC,CPTII,S$GLB, | Performed by: INTERNAL MEDICINE

## 2023-06-29 PROCEDURE — 3044F PR MOST RECENT HEMOGLOBIN A1C LEVEL <7.0%: ICD-10-PCS | Mod: HCNC,CPTII,S$GLB, | Performed by: INTERNAL MEDICINE

## 2023-06-29 PROCEDURE — 3008F BODY MASS INDEX DOCD: CPT | Mod: HCNC,CPTII,S$GLB, | Performed by: INTERNAL MEDICINE

## 2023-06-29 NOTE — PROGRESS NOTES
"Subjective:       Patient ID: Nadia Damon is a 67 y.o. female.    Chief Complaint: Chronic Kidney Disease    HPI    She presents to clinic today for routine follow-up.  Since her last office visit she has been doing well.  She relates that she is having ongoing issues with bladder control.  She is being scheduled for a bladder procedure per Urology in the near future.  She needs clearance from a Nephrology standpoint.  Her laboratory studies and medications were reviewed.  All Nephrology related questions were answered to her satisfaction.    Review of Systems   Constitutional: Negative.    HENT: Negative.     Eyes: Negative.    Respiratory: Negative.     Cardiovascular: Negative.    Gastrointestinal: Negative.    Genitourinary: Negative.    Musculoskeletal: Negative.    Skin: Negative.    Neurological: Negative.        BP (!) 142/82   Pulse 92   Ht 5' 2" (1.575 m)   Wt 75.7 kg (166 lb 14.2 oz)   LMP 06/20/1983 (Within Years)   BMI 30.52 kg/m²     Lab Results   Component Value Date    WBC 3.77 (L) 06/26/2023    HGB 9.0 (L) 06/26/2023    HCT 29.1 (L) 06/26/2023    MCV 90 06/26/2023     06/26/2023      BMP  Lab Results   Component Value Date     06/20/2023    K 4.1 06/20/2023     06/20/2023    CO2 20 (L) 06/20/2023    BUN 37 (H) 06/20/2023    CREATININE 2.8 (H) 06/20/2023    CALCIUM 8.9 06/20/2023    ANIONGAP 15 06/20/2023    ESTGFRAFRICA 19 (A) 07/14/2022    EGFRNONAA 17 (A) 07/14/2022     CMP  Sodium   Date Value Ref Range Status   06/20/2023 145 136 - 145 mmol/L Final     Potassium   Date Value Ref Range Status   06/20/2023 4.1 3.5 - 5.1 mmol/L Final     Chloride   Date Value Ref Range Status   06/20/2023 110 95 - 110 mmol/L Final     CO2   Date Value Ref Range Status   06/20/2023 20 (L) 23 - 29 mmol/L Final     Glucose   Date Value Ref Range Status   06/20/2023 85 70 - 110 mg/dL Final     BUN   Date Value Ref Range Status   06/20/2023 37 (H) 8 - 23 mg/dL Final     Creatinine   Date " Value Ref Range Status   06/20/2023 2.8 (H) 0.5 - 1.4 mg/dL Final     Calcium   Date Value Ref Range Status   06/20/2023 8.9 8.7 - 10.5 mg/dL Final     Total Protein   Date Value Ref Range Status   06/20/2023 7.8 6.0 - 8.4 g/dL Final     Albumin   Date Value Ref Range Status   06/20/2023 3.5 3.5 - 5.2 g/dL Final     Total Bilirubin   Date Value Ref Range Status   06/20/2023 0.5 0.1 - 1.0 mg/dL Final     Comment:     For infants and newborns, interpretation of results should be based  on gestational age, weight and in agreement with clinical  observations.    Premature Infant recommended reference ranges:  Up to 24 hours.............<8.0 mg/dL  Up to 48 hours............<12.0 mg/dL  3-5 days..................<15.0 mg/dL  6-29 days.................<15.0 mg/dL       Alkaline Phosphatase   Date Value Ref Range Status   06/20/2023 118 55 - 135 U/L Final     AST   Date Value Ref Range Status   06/20/2023 36 10 - 40 U/L Final     ALT   Date Value Ref Range Status   06/20/2023 23 10 - 44 U/L Final     Anion Gap   Date Value Ref Range Status   06/20/2023 15 8 - 16 mmol/L Final     eGFR if    Date Value Ref Range Status   07/14/2022 19 (A) >60 mL/min/1.73 m^2 Final     eGFR if non    Date Value Ref Range Status   07/14/2022 17 (A) >60 mL/min/1.73 m^2 Final     Comment:     Calculation used to obtain the estimated glomerular filtration  rate (eGFR) is the CKD-EPI equation.        Current Outpatient Medications on File Prior to Visit   Medication Sig Dispense Refill    albuterol (VENTOLIN HFA) 90 mcg/actuation inhaler Inhale 2 puffs into the lungs every 4 (four) hours as needed for Wheezing or Shortness of Breath (cough). Rescue 18 g 3    aspirin (ECOTRIN) 81 MG EC tablet Take 1 tablet (81 mg total) by mouth once daily. 90 tablet 3    biotin 10,000 mcg Cap Take 1 tablet by mouth once daily.      busPIRone (BUSPAR) 10 MG tablet TAKE 1 TABLET EVERY DAY 90 tablet 3    calcitonin, salmon, (FORTICAL)  200 unit/actuation nasal spray 1 spray by Nasal route once daily. 3 each 3    carvediloL (COREG) 6.25 MG tablet Take 1 tablet (6.25 mg total) by mouth 2 (two) times daily with meals. 180 tablet 3    cycloSPORINE modified, NEORAL, (NEORAL) 25 MG capsule TAKE 3 CAPSULES (75 MG TOTAL) BY MOUTH 2 (TWO) TIMES DAILY. 540 capsule 6    EVENING PRIMROSE OIL ORAL Take 1,000 mg by mouth once daily.      fluticasone propionate (FLONASE) 50 mcg/actuation nasal spray One spray each nares daily x 4 weeks then as needed 16 g 0    furosemide (LASIX) 20 MG tablet Take 1 tablet (20 mg total) by mouth once daily. 90 tablet 1    guaiFENesin (MUCINEX) 600 mg 12 hr tablet Take 1,200 mg by mouth as needed.      hydrALAZINE (APRESOLINE) 50 MG tablet Take 1 tablet (50 mg total) by mouth every 8 (eight) hours. TAKE 1 TABLETS  EVERY 8  HOURS. 270 tablet 3    LIDOcaine (LIDODERM) 5 % PLACE 2 PATCHES ONTO THE SKIN ONCE DAILY. REMOVE AND DISCARD PATCHES WITHIN 12 HOURS OR AS DIRECTED BY  patch 5    multivitamin capsule Take 1 capsule by mouth once daily.      NIFEdipine (PROCARDIA-XL) 30 MG (OSM) 24 hr tablet Take 1 tablet (30 mg total) by mouth once daily. 30 tablet 11    pantoprazole (PROTONIX) 40 MG tablet TAKE 1 TABLET EVERY DAY 90 tablet 1    polyethylene glycol (GLYCOLAX) 17 gram/dose powder Take 17 g by mouth once daily.      pregabalin (LYRICA) 50 MG capsule Take 1 capsule (50 mg total) by mouth 2 (two) times daily. NOON & NIGHT TIME 180 capsule 1    psyllium husk (METAMUCIL ORAL) Take by mouth.      temazepam (RESTORIL) 30 mg capsule TAKE 1 CAPSULE AT BEDTIME 30 capsule 5    UNABLE TO FIND medication name: Stool softener (Ducolax) Laxative      vitamin E 400 UNIT capsule Take 400 Units by mouth once daily.      zolpidem (AMBIEN) 10 mg Tab TAKE 0.5 to 1 TABLET at bedtime as needed for insomnia 30 tablet 0     No current facility-administered medications on file prior to visit.            Objective:            Physical  Exam  Constitutional:       Appearance: Normal appearance.   HENT:      Head: Normocephalic and atraumatic.   Eyes:      General: No scleral icterus.     Extraocular Movements: Extraocular movements intact.      Pupils: Pupils are equal, round, and reactive to light.   Cardiovascular:      Rate and Rhythm: Normal rate and regular rhythm.   Pulmonary:      Effort: Pulmonary effort is normal.      Breath sounds: Normal breath sounds.   Musculoskeletal:      Right lower leg: No edema.      Left lower leg: No edema.   Skin:     General: Skin is warm and dry.   Neurological:      General: No focal deficit present.      Mental Status: She is alert and oriented to person, place, and time.   Psychiatric:         Mood and Affect: Mood normal.         Behavior: Behavior normal.       Assessment:       1. CKD (chronic kidney disease) stage 4, GFR 15-29 ml/min    2. Immunosuppression    3. Primary hypertension        Plan:       1. Creatinine has remained relatively stable fluctuating between 2.2 and 2.8 for the past year or so.  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    She is euvolemic on exam.  Potassium is 4.1.    She is stable from a renal standpoint for her upcoming bladder surgery.    2. She is currently on 75 mg twice a day of cyclosporine.  Managed by her heart transplant team.  Most recent level is 96.  For the past year so her cyclosporine levels have all been within an acceptable range to decrease risk of progression calcineurin toxicity.    3. Blood pressure is well controlled on current regimen.        Gunner Melgar MD

## 2023-06-29 NOTE — TELEPHONE ENCOUNTER
----- Message from Nick Edouard sent at 6/29/2023  9:08 AM CDT -----  Contact: Nadia  Patient is calling to speak with then nurse regarding appt. Reports running late for appt waiting on ride. Patient expresses the need to be seen today as she prepares for surgery next week. Please give patient a call at [phone]

## 2023-07-03 ENCOUNTER — TELEPHONE (OUTPATIENT)
Dept: TRANSPLANT | Facility: CLINIC | Age: 68
End: 2023-07-03
Payer: MEDICARE

## 2023-07-03 ENCOUNTER — TELEPHONE (OUTPATIENT)
Dept: HEMATOLOGY/ONCOLOGY | Facility: CLINIC | Age: 68
End: 2023-07-03
Payer: MEDICARE

## 2023-07-03 NOTE — TELEPHONE ENCOUNTER
Spoke to pt who had received nifedipine in the mail. She said that when she saw Dr. Tubbs recently, they had discussed swelling of the ankles. I checked her med list and it did not have the amlodipine anymore but did have nifedipine. I told her to start tomorrow with stopping amlodipine and starting nifedipine. I asked that she monitor her BP twice daily for a week and call or send her log. Pt verbalized understanding.

## 2023-07-03 NOTE — TELEPHONE ENCOUNTER
SW returned pt's vm call and scheduled the following [employer] ride per pt request, as it was too late to verify/utilize [employer] transportation if any due to 48 hr notice required.     scheduled on 7/5, 01:20 AM CDT   may arrive between 01:20 AM - 01:35 AM CDT.    [place]  Full name  Nadia Damon  Mobile phone number  [phone]  Ride type  Ly[employer] (4 seats)  Route  Pickup  [address], [place], [facility]  Drop-off  [place], [facility]  Addresses may vary from the original request due to minor pickup and drop-off changes. For example, an address may vary if a rider is picked up across the street.  Request details  Coordinator name  Liset Lauren  Date and time  7/3/23, 01:25 PM CDT  Scheduled ride ID  [id number]

## 2023-07-05 ENCOUNTER — OFFICE VISIT (OUTPATIENT)
Dept: OBSTETRICS AND GYNECOLOGY | Facility: CLINIC | Age: 68
End: 2023-07-05
Payer: MEDICARE

## 2023-07-05 ENCOUNTER — TELEPHONE (OUTPATIENT)
Dept: HEMATOLOGY/ONCOLOGY | Facility: CLINIC | Age: 68
End: 2023-07-05
Payer: MEDICARE

## 2023-07-05 VITALS
SYSTOLIC BLOOD PRESSURE: 110 MMHG | HEIGHT: 62 IN | DIASTOLIC BLOOD PRESSURE: 68 MMHG | WEIGHT: 167.75 LBS | BODY MASS INDEX: 30.87 KG/M2

## 2023-07-05 DIAGNOSIS — N99.3 PROLAPSE OF VAGINAL CUFF AFTER HYSTERECTOMY: Primary | ICD-10-CM

## 2023-07-05 DIAGNOSIS — N39.3 SUI (STRESS URINARY INCONTINENCE, FEMALE): ICD-10-CM

## 2023-07-05 PROBLEM — T45.1X5A IMMUNODEFICIENCY DUE TO CHEMOTHERAPY: Status: RESOLVED | Noted: 2021-11-29 | Resolved: 2023-07-05

## 2023-07-05 PROBLEM — T88.7XXA MEDICATION SIDE EFFECTS: Status: RESOLVED | Noted: 2019-06-26 | Resolved: 2023-07-05

## 2023-07-05 PROBLEM — M25.512 LEFT SHOULDER PAIN: Status: RESOLVED | Noted: 2021-12-13 | Resolved: 2023-07-05

## 2023-07-05 PROBLEM — D84.821 IMMUNODEFICIENCY DUE TO CHEMOTHERAPY: Status: RESOLVED | Noted: 2021-11-29 | Resolved: 2023-07-05

## 2023-07-05 PROBLEM — Z79.69 IMMUNODEFICIENCY DUE TO CHEMOTHERAPY: Status: RESOLVED | Noted: 2021-11-29 | Resolved: 2023-07-05

## 2023-07-05 PROBLEM — M81.0 OSTEOPOROSIS, POST-MENOPAUSAL: Status: RESOLVED | Noted: 2021-07-07 | Resolved: 2023-07-05

## 2023-07-05 PROBLEM — R29.898 WEAKNESS OF BOTH LOWER EXTREMITIES: Status: RESOLVED | Noted: 2021-01-05 | Resolved: 2023-07-05

## 2023-07-05 PROBLEM — N95.2 VAGINAL ATROPHY: Status: RESOLVED | Noted: 2017-08-15 | Resolved: 2023-07-05

## 2023-07-05 PROBLEM — R42 LIGHTHEADEDNESS: Status: RESOLVED | Noted: 2019-01-09 | Resolved: 2023-07-05

## 2023-07-05 PROBLEM — Z79.899 IMMUNODEFICIENCY DUE TO CHEMOTHERAPY: Status: RESOLVED | Noted: 2021-11-29 | Resolved: 2023-07-05

## 2023-07-05 PROCEDURE — 1159F MED LIST DOCD IN RCRD: CPT | Mod: HCNC,CPTII,S$GLB, | Performed by: OBSTETRICS & GYNECOLOGY

## 2023-07-05 PROCEDURE — 3074F SYST BP LT 130 MM HG: CPT | Mod: HCNC,CPTII,S$GLB, | Performed by: OBSTETRICS & GYNECOLOGY

## 2023-07-05 PROCEDURE — 3078F DIAST BP <80 MM HG: CPT | Mod: HCNC,CPTII,S$GLB, | Performed by: OBSTETRICS & GYNECOLOGY

## 2023-07-05 PROCEDURE — 1160F RVW MEDS BY RX/DR IN RCRD: CPT | Mod: HCNC,CPTII,S$GLB, | Performed by: OBSTETRICS & GYNECOLOGY

## 2023-07-05 PROCEDURE — 1157F ADVNC CARE PLAN IN RCRD: CPT | Mod: HCNC,CPTII,S$GLB, | Performed by: OBSTETRICS & GYNECOLOGY

## 2023-07-05 PROCEDURE — 3008F BODY MASS INDEX DOCD: CPT | Mod: HCNC,CPTII,S$GLB, | Performed by: OBSTETRICS & GYNECOLOGY

## 2023-07-05 PROCEDURE — 1126F PR PAIN SEVERITY QUANTIFIED, NO PAIN PRESENT: ICD-10-PCS | Mod: HCNC,CPTII,S$GLB, | Performed by: OBSTETRICS & GYNECOLOGY

## 2023-07-05 PROCEDURE — 3008F PR BODY MASS INDEX (BMI) DOCUMENTED: ICD-10-PCS | Mod: HCNC,CPTII,S$GLB, | Performed by: OBSTETRICS & GYNECOLOGY

## 2023-07-05 PROCEDURE — 1126F AMNT PAIN NOTED NONE PRSNT: CPT | Mod: HCNC,CPTII,S$GLB, | Performed by: OBSTETRICS & GYNECOLOGY

## 2023-07-05 PROCEDURE — 3288F PR FALLS RISK ASSESSMENT DOCUMENTED: ICD-10-PCS | Mod: HCNC,CPTII,S$GLB, | Performed by: OBSTETRICS & GYNECOLOGY

## 2023-07-05 PROCEDURE — 1101F PR PT FALLS ASSESS DOC 0-1 FALLS W/OUT INJ PAST YR: ICD-10-PCS | Mod: HCNC,CPTII,S$GLB, | Performed by: OBSTETRICS & GYNECOLOGY

## 2023-07-05 PROCEDURE — 1157F PR ADVANCE CARE PLAN OR EQUIV PRESENT IN MEDICAL RECORD: ICD-10-PCS | Mod: HCNC,CPTII,S$GLB, | Performed by: OBSTETRICS & GYNECOLOGY

## 2023-07-05 PROCEDURE — 99213 PR OFFICE/OUTPT VISIT, EST, LEVL III, 20-29 MIN: ICD-10-PCS | Mod: HCNC,S$GLB,, | Performed by: OBSTETRICS & GYNECOLOGY

## 2023-07-05 PROCEDURE — 99999 PR PBB SHADOW E&M-EST. PATIENT-LVL IV: ICD-10-PCS | Mod: PBBFAC,HCNC,, | Performed by: OBSTETRICS & GYNECOLOGY

## 2023-07-05 PROCEDURE — 3066F PR DOCUMENTATION OF TREATMENT FOR NEPHROPATHY: ICD-10-PCS | Mod: HCNC,CPTII,S$GLB, | Performed by: OBSTETRICS & GYNECOLOGY

## 2023-07-05 PROCEDURE — 1160F PR REVIEW ALL MEDS BY PRESCRIBER/CLIN PHARMACIST DOCUMENTED: ICD-10-PCS | Mod: HCNC,CPTII,S$GLB, | Performed by: OBSTETRICS & GYNECOLOGY

## 2023-07-05 PROCEDURE — 3044F PR MOST RECENT HEMOGLOBIN A1C LEVEL <7.0%: ICD-10-PCS | Mod: HCNC,CPTII,S$GLB, | Performed by: OBSTETRICS & GYNECOLOGY

## 2023-07-05 PROCEDURE — 99999 PR PBB SHADOW E&M-EST. PATIENT-LVL IV: CPT | Mod: PBBFAC,HCNC,, | Performed by: OBSTETRICS & GYNECOLOGY

## 2023-07-05 PROCEDURE — 3288F FALL RISK ASSESSMENT DOCD: CPT | Mod: HCNC,CPTII,S$GLB, | Performed by: OBSTETRICS & GYNECOLOGY

## 2023-07-05 PROCEDURE — 3078F PR MOST RECENT DIASTOLIC BLOOD PRESSURE < 80 MM HG: ICD-10-PCS | Mod: HCNC,CPTII,S$GLB, | Performed by: OBSTETRICS & GYNECOLOGY

## 2023-07-05 PROCEDURE — 99213 OFFICE O/P EST LOW 20 MIN: CPT | Mod: HCNC,S$GLB,, | Performed by: OBSTETRICS & GYNECOLOGY

## 2023-07-05 PROCEDURE — 3044F HG A1C LEVEL LT 7.0%: CPT | Mod: HCNC,CPTII,S$GLB, | Performed by: OBSTETRICS & GYNECOLOGY

## 2023-07-05 PROCEDURE — 3066F NEPHROPATHY DOC TX: CPT | Mod: HCNC,CPTII,S$GLB, | Performed by: OBSTETRICS & GYNECOLOGY

## 2023-07-05 PROCEDURE — 1101F PT FALLS ASSESS-DOCD LE1/YR: CPT | Mod: HCNC,CPTII,S$GLB, | Performed by: OBSTETRICS & GYNECOLOGY

## 2023-07-05 PROCEDURE — 1159F PR MEDICATION LIST DOCUMENTED IN MEDICAL RECORD: ICD-10-PCS | Mod: HCNC,CPTII,S$GLB, | Performed by: OBSTETRICS & GYNECOLOGY

## 2023-07-05 PROCEDURE — 3074F PR MOST RECENT SYSTOLIC BLOOD PRESSURE < 130 MM HG: ICD-10-PCS | Mod: HCNC,CPTII,S$GLB, | Performed by: OBSTETRICS & GYNECOLOGY

## 2023-07-05 NOTE — TELEPHONE ENCOUNTER
Swer received missed call and voice message from pt. this morning. Swer returned pt.'s call. Pt. reported [employer] arrived at 1 in the morning. Swer rescheduled pt. [employer] for 1:20 pm pickup time. Swer to assist with pt. return ride home as well. Swer will remain available.

## 2023-07-06 ENCOUNTER — LAB VISIT (OUTPATIENT)
Dept: LAB | Facility: HOSPITAL | Age: 68
End: 2023-07-06
Payer: MEDICARE

## 2023-07-06 DIAGNOSIS — M81.0 OSTEOPOROSIS, UNSPECIFIED OSTEOPOROSIS TYPE, UNSPECIFIED PATHOLOGICAL FRACTURE PRESENCE: ICD-10-CM

## 2023-07-06 LAB
25(OH)D3+25(OH)D2 SERPL-MCNC: 36 NG/ML (ref 30–96)
ALBUMIN SERPL BCP-MCNC: 3.3 G/DL (ref 3.5–5.2)
ALP SERPL-CCNC: 136 U/L (ref 55–135)
ALT SERPL W/O P-5'-P-CCNC: 21 U/L (ref 10–44)
ANION GAP SERPL CALC-SCNC: 13 MMOL/L (ref 8–16)
AST SERPL-CCNC: 32 U/L (ref 10–40)
BILIRUB SERPL-MCNC: 0.5 MG/DL (ref 0.1–1)
BUN SERPL-MCNC: 33 MG/DL (ref 8–23)
CALCIUM SERPL-MCNC: 8.4 MG/DL (ref 8.7–10.5)
CHLORIDE SERPL-SCNC: 111 MMOL/L (ref 95–110)
CO2 SERPL-SCNC: 21 MMOL/L (ref 23–29)
CREAT SERPL-MCNC: 2.4 MG/DL (ref 0.5–1.4)
EST. GFR  (NO RACE VARIABLE): 21 ML/MIN/1.73 M^2
GLUCOSE SERPL-MCNC: 139 MG/DL (ref 70–110)
POTASSIUM SERPL-SCNC: 4.7 MMOL/L (ref 3.5–5.1)
PROT SERPL-MCNC: 7.5 G/DL (ref 6–8.4)
SODIUM SERPL-SCNC: 145 MMOL/L (ref 136–145)

## 2023-07-06 PROCEDURE — 80053 COMPREHEN METABOLIC PANEL: CPT | Mod: HCNC | Performed by: INTERNAL MEDICINE

## 2023-07-06 PROCEDURE — 36415 COLL VENOUS BLD VENIPUNCTURE: CPT | Mod: HCNC | Performed by: INTERNAL MEDICINE

## 2023-07-06 PROCEDURE — 82306 VITAMIN D 25 HYDROXY: CPT | Mod: HCNC | Performed by: INTERNAL MEDICINE

## 2023-07-06 NOTE — PROGRESS NOTES
"  Subjective:       Patient ID: Nadia Damon is a 67 y.o. female.    Chief Complaint:  Surgical Consult (Patient in for a surgical consult for her bladder. )      History of Present Illness  HPI  Patient returns after  consultation   Urodynamics suggest FRANKY that sling or Viera would improve Some degree of urge  Continues to have vaginal pressure and symptoms of cuff prolapse   Ready to proceed with surgical process     Health Maintenance   Topic Date Due    Mammogram  04/10/2024    Lipid Panel  2024    DEXA Scan  2026    TETANUS VACCINE  2030    Hepatitis C Screening  Completed     GYN & OB History  Patient's last menstrual period was 1983 (within years).   Date of Last Pap: No result found    OB History    Para Term  AB Living   4 2 2     2   SAB IAB Ectopic Multiple Live Births                  # Outcome Date GA Lbr Tirso/2nd Weight Sex Delivery Anes PTL Lv   4 Term            3 Term            2       Vag-Spont      1       Vag-Spont          Review of Systems  Review of Systems        Objective:   /68   Ht 5' 2" (1.575 m)   Wt 76.1 kg (167 lb 12.3 oz)   LMP 1983 (Within Years)   BMI 30.69 kg/m²    Physical Exam     Assessment:        1. 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 FRANKY (stress urinary incontinence, female)               Plan:            Nadia was seen today for surgical consult.    Diagnoses and all orders for this visit:    Prolapse of vaginal cuff after hysterectomy  Comments:  Robotic Sacral colpopexy with Viera procedure     FRANKY (stress urinary incontinence, female)  Comments:  Robotic Viera         "

## 2023-07-07 ENCOUNTER — TELEPHONE (OUTPATIENT)
Dept: INFUSION THERAPY | Facility: HOSPITAL | Age: 68
End: 2023-07-07
Payer: MEDICARE

## 2023-07-07 NOTE — TELEPHONE ENCOUNTER
Spoke with patient and let her know that Ms. Burk wants her to have a retacrit injection soon and another 2 wks after that.  Appointments made per patient's preferences. Call ended well.

## 2023-07-10 ENCOUNTER — INFUSION (OUTPATIENT)
Dept: INFUSION THERAPY | Facility: HOSPITAL | Age: 68
End: 2023-07-10
Attending: INTERNAL MEDICINE
Payer: MEDICARE

## 2023-07-10 VITALS
TEMPERATURE: 98 F | DIASTOLIC BLOOD PRESSURE: 71 MMHG | OXYGEN SATURATION: 96 % | SYSTOLIC BLOOD PRESSURE: 127 MMHG | RESPIRATION RATE: 16 BRPM | HEART RATE: 101 BPM

## 2023-07-10 DIAGNOSIS — N18.4 ANEMIA ASSOCIATED WITH STAGE 4 CHRONIC RENAL FAILURE: Primary | ICD-10-CM

## 2023-07-10 DIAGNOSIS — M81.8 OTHER OSTEOPOROSIS WITHOUT CURRENT PATHOLOGICAL FRACTURE: ICD-10-CM

## 2023-07-10 DIAGNOSIS — D63.1 ANEMIA ASSOCIATED WITH STAGE 4 CHRONIC RENAL FAILURE: Primary | ICD-10-CM

## 2023-07-10 PROCEDURE — 63600175 PHARM REV CODE 636 W HCPCS: Mod: JZ,JG,HCNC

## 2023-07-10 PROCEDURE — 96372 THER/PROPH/DIAG INJ SC/IM: CPT | Mod: HCNC

## 2023-07-10 RX ADMIN — EPOETIN ALFA-EPBX 20000 UNITS: 10000 INJECTION, SOLUTION INTRAVENOUS; SUBCUTANEOUS at 10:07

## 2023-07-12 DIAGNOSIS — Z01.818 PREOP EXAMINATION: ICD-10-CM

## 2023-07-12 DIAGNOSIS — N99.3 PROLAPSE OF VAGINAL CUFF AFTER HYSTERECTOMY: ICD-10-CM

## 2023-07-12 DIAGNOSIS — N39.3 SUI (STRESS URINARY INCONTINENCE, FEMALE): Primary | ICD-10-CM

## 2023-07-13 ENCOUNTER — OFFICE VISIT (OUTPATIENT)
Dept: RHEUMATOLOGY | Facility: CLINIC | Age: 68
End: 2023-07-13
Payer: MEDICARE

## 2023-07-13 VITALS
HEIGHT: 62 IN | DIASTOLIC BLOOD PRESSURE: 83 MMHG | BODY MASS INDEX: 30.87 KG/M2 | HEART RATE: 96 BPM | SYSTOLIC BLOOD PRESSURE: 142 MMHG | WEIGHT: 167.75 LBS

## 2023-07-13 DIAGNOSIS — N25.81 SECONDARY HYPERPARATHYROIDISM, RENAL: Chronic | ICD-10-CM

## 2023-07-13 DIAGNOSIS — E66.9 OBESITY (BMI 30.0-34.9): ICD-10-CM

## 2023-07-13 DIAGNOSIS — Z94.1 HEART TRANSPLANTED: Chronic | ICD-10-CM

## 2023-07-13 DIAGNOSIS — M81.0 OSTEOPOROSIS, UNSPECIFIED OSTEOPOROSIS TYPE, UNSPECIFIED PATHOLOGICAL FRACTURE PRESENCE: Primary | ICD-10-CM

## 2023-07-13 DIAGNOSIS — D05.12 DUCTAL CARCINOMA IN SITU (DCIS) OF LEFT BREAST: ICD-10-CM

## 2023-07-13 DIAGNOSIS — N18.4 CKD (CHRONIC KIDNEY DISEASE) STAGE 4, GFR 15-29 ML/MIN: Chronic | ICD-10-CM

## 2023-07-13 DIAGNOSIS — Z51.81 MEDICATION MONITORING ENCOUNTER: ICD-10-CM

## 2023-07-13 DIAGNOSIS — C77.3 SECONDARY AND UNSPECIFIED MALIGNANT NEOPLASM OF AXILLA AND UPPER LIMB LYMPH NODES: ICD-10-CM

## 2023-07-13 DIAGNOSIS — M79.7 FIBROMYALGIA: ICD-10-CM

## 2023-07-13 DIAGNOSIS — D84.9 IMMUNOCOMPROMISED PATIENT: ICD-10-CM

## 2023-07-13 DIAGNOSIS — M51.35 DDD (DEGENERATIVE DISC DISEASE), THORACOLUMBAR: ICD-10-CM

## 2023-07-13 PROCEDURE — 3079F DIAST BP 80-89 MM HG: CPT | Mod: HCNC,CPTII,S$GLB, | Performed by: INTERNAL MEDICINE

## 2023-07-13 PROCEDURE — 3044F PR MOST RECENT HEMOGLOBIN A1C LEVEL <7.0%: ICD-10-PCS | Mod: HCNC,CPTII,S$GLB, | Performed by: INTERNAL MEDICINE

## 2023-07-13 PROCEDURE — 1159F PR MEDICATION LIST DOCUMENTED IN MEDICAL RECORD: ICD-10-PCS | Mod: HCNC,CPTII,S$GLB, | Performed by: INTERNAL MEDICINE

## 2023-07-13 PROCEDURE — 99214 PR OFFICE/OUTPT VISIT, EST, LEVL IV, 30-39 MIN: ICD-10-PCS | Mod: HCNC,S$GLB,, | Performed by: INTERNAL MEDICINE

## 2023-07-13 PROCEDURE — 3288F PR FALLS RISK ASSESSMENT DOCUMENTED: ICD-10-PCS | Mod: HCNC,CPTII,S$GLB, | Performed by: INTERNAL MEDICINE

## 2023-07-13 PROCEDURE — 3008F BODY MASS INDEX DOCD: CPT | Mod: HCNC,CPTII,S$GLB, | Performed by: INTERNAL MEDICINE

## 2023-07-13 PROCEDURE — 3044F HG A1C LEVEL LT 7.0%: CPT | Mod: HCNC,CPTII,S$GLB, | Performed by: INTERNAL MEDICINE

## 2023-07-13 PROCEDURE — 99214 OFFICE O/P EST MOD 30 MIN: CPT | Mod: HCNC,S$GLB,, | Performed by: INTERNAL MEDICINE

## 2023-07-13 PROCEDURE — 3079F PR MOST RECENT DIASTOLIC BLOOD PRESSURE 80-89 MM HG: ICD-10-PCS | Mod: HCNC,CPTII,S$GLB, | Performed by: INTERNAL MEDICINE

## 2023-07-13 PROCEDURE — 3008F PR BODY MASS INDEX (BMI) DOCUMENTED: ICD-10-PCS | Mod: HCNC,CPTII,S$GLB, | Performed by: INTERNAL MEDICINE

## 2023-07-13 PROCEDURE — 1125F AMNT PAIN NOTED PAIN PRSNT: CPT | Mod: HCNC,CPTII,S$GLB, | Performed by: INTERNAL MEDICINE

## 2023-07-13 PROCEDURE — 1101F PT FALLS ASSESS-DOCD LE1/YR: CPT | Mod: HCNC,CPTII,S$GLB, | Performed by: INTERNAL MEDICINE

## 2023-07-13 PROCEDURE — 3288F FALL RISK ASSESSMENT DOCD: CPT | Mod: HCNC,CPTII,S$GLB, | Performed by: INTERNAL MEDICINE

## 2023-07-13 PROCEDURE — 3066F NEPHROPATHY DOC TX: CPT | Mod: HCNC,CPTII,S$GLB, | Performed by: INTERNAL MEDICINE

## 2023-07-13 PROCEDURE — 1101F PR PT FALLS ASSESS DOC 0-1 FALLS W/OUT INJ PAST YR: ICD-10-PCS | Mod: HCNC,CPTII,S$GLB, | Performed by: INTERNAL MEDICINE

## 2023-07-13 PROCEDURE — 99999 PR PBB SHADOW E&M-EST. PATIENT-LVL IV: ICD-10-PCS | Mod: PBBFAC,HCNC,, | Performed by: INTERNAL MEDICINE

## 2023-07-13 PROCEDURE — 1157F ADVNC CARE PLAN IN RCRD: CPT | Mod: HCNC,CPTII,S$GLB, | Performed by: INTERNAL MEDICINE

## 2023-07-13 PROCEDURE — 1159F MED LIST DOCD IN RCRD: CPT | Mod: HCNC,CPTII,S$GLB, | Performed by: INTERNAL MEDICINE

## 2023-07-13 PROCEDURE — 1125F PR PAIN SEVERITY QUANTIFIED, PAIN PRESENT: ICD-10-PCS | Mod: HCNC,CPTII,S$GLB, | Performed by: INTERNAL MEDICINE

## 2023-07-13 PROCEDURE — 3077F SYST BP >= 140 MM HG: CPT | Mod: HCNC,CPTII,S$GLB, | Performed by: INTERNAL MEDICINE

## 2023-07-13 PROCEDURE — 99999 PR PBB SHADOW E&M-EST. PATIENT-LVL IV: CPT | Mod: PBBFAC,HCNC,, | Performed by: INTERNAL MEDICINE

## 2023-07-13 PROCEDURE — 3066F PR DOCUMENTATION OF TREATMENT FOR NEPHROPATHY: ICD-10-PCS | Mod: HCNC,CPTII,S$GLB, | Performed by: INTERNAL MEDICINE

## 2023-07-13 PROCEDURE — 3077F PR MOST RECENT SYSTOLIC BLOOD PRESSURE >= 140 MM HG: ICD-10-PCS | Mod: HCNC,CPTII,S$GLB, | Performed by: INTERNAL MEDICINE

## 2023-07-13 PROCEDURE — 1157F PR ADVANCE CARE PLAN OR EQUIV PRESENT IN MEDICAL RECORD: ICD-10-PCS | Mod: HCNC,CPTII,S$GLB, | Performed by: INTERNAL MEDICINE
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
## 2023-07-18 ENCOUNTER — CLINICAL SUPPORT (OUTPATIENT)
Dept: REHABILITATION | Facility: HOSPITAL | Age: 68
End: 2023-07-18
Payer: MEDICARE

## 2023-07-18 DIAGNOSIS — M25.612 DECREASED SHOULDER MOBILITY, LEFT: Primary | ICD-10-CM

## 2023-07-18 PROCEDURE — 97140 MANUAL THERAPY 1/> REGIONS: CPT | Mod: HCNC,PN

## 2023-07-18 PROCEDURE — 97110 THERAPEUTIC EXERCISES: CPT | Mod: HCNC,PN

## 2023-07-18 NOTE — PROGRESS NOTES
Physical Therapy/Lymphedema Daily Treatment Note/UPOC     Name: Nadia Damon  Clinic Number: [id number]    Therapy Diagnosis:   Encounter Diagnosis   Name Primary?    Decreased shoulder mobility, left Yes     Physician: Kristine Delgado NP    Visit Date: 7/18/2023    Physician Orders: PT Eval and Treat   Medical Diagnosis: chronic left shoulder pain  Evaluation Date: 4/11/2023  Authorization Period Expiration: 12/31/2023  Progress Note due: 8/18/2023  Plan of Care Expiration: 10/10/2023  Visit #/Visits authorized: 4/ 20    Time In: 0915 am (late to appointment)  Time Out: 1000 am  Total Billable Time: 45 minutes    Precautions: Standard    Subjective     Pt reports: has more energy since having surgery. Will be having bladder surgery 8/10 and won't be able to come back to therapy for an unknown about of time. Wears her compression sleeve daily  She was compliant with home exercise program.  Response to previous treatment: good  Functional change: none at this time    Pain: 7/10  Location: left axilla when reaching overhead    Objective     Objective Measures updated at progress report unless specified      CMS Impairment/Limitation/Restriction for FOTO shoulder Survey    Therapist reviewed FOTO scores for Nadia Damon on 5/23/2023.   FOTO documents entered into [employer] - see Media section.    Limitation Score: 66% (eval), 50% (2nd)       Treatment:     Nadia received the following self care and home management x -- minutes    Nadia received therapeutic exercises to develop strength, ROM, flexibility, and posture for 23 minutes including:   THERAPEUTIC EXERCISES:  Continue HEP of AROM, stretching, and postural correction.     Intervention Performed Today  COVID FATIGUE   Supine overhead flexion   AAROM   Supine chicken wing/cactus arms      Open books/windmills x  standing    Modified child's pose      Cat cow stretch      Quadruped rock backs      Child's pose      Reach and thread the needle      Shoulder  abduction overhead          Elliptical x 1.20 minutes   Matrix rows x 15# 3x10   Matrix biceps x 40# x 30           Plan for Next Visit: Will receive soft tissue mobilization to left shoulder and updated exercises prn for the left shoulder motion and strengthening for bilateral upper extremities/shoulders        Nadia received the following manual therapy techniques applied for (22) minutes, including:        Intervention Performed Today     Cervical soft tissue mobilization      Upper trap soft tissue mobilization      Clavicular, intercostal soft tissue mobilization      Pec muscle soft tissue mobilization/release x    Subscapularis soft tissue mobilization/release x    Latissimus dors muscle soft tissue mobilization  x    Rhomboids soft tissue mobilization      Axillary Web Syndrome release techniques N/a                 Plan for Next Visit: manual therapy as needed         Home Exercises Provided and Patient Education Provided:   See self care section above    Written Home Exercises Provided: yes. Refer to last visit  Exercises were reviewed and Nadia was able to demonstrate them prior to the end of the session.  Nadia demonstrated good  understanding of the education provided.     Assessment     Nadia is progressing well with physical therapy, with moderate complaints of pain or discomfort and significant improvements noted in shoulder ROM since initial evaluation. Due to Nadia not being able to attend therapy regularly, she now shows a decline in range of motion of the left shoulder as compared to the progress note on 5/23/2023; please see exam for details. Nadia continues to have difficulty with shoulder range of motion and strength, due to late effects of breast surgery. Her endurance is impaired due to recent onset of Covid where she was hospitalized for 20 days.  FOTO scores noted above indicate the patients perceived functional levels are improving since the evaluation    Pt prognosis is Excellent.     Pt  will continue to benefit from skilled outpatient physical therapy to address the deficits listed in the problem list box on initial evaluation, provide pt/family education and to maximize pt's level of independence in the home and community environment.     Pt's spiritual, cultural and educational needs considered and pt agreeable to plan of care and goals.     Anticipated barriers to physical therapy: none    Goals:     Short Term goals: 6 weeks  1. Patient will demonstrate 100% understanding of lymphedema risk reduction practices to include self monitoring for lymphedema. (GOAL MET)  2. Patient will demonstrate independence with Home Exercise program established. (progressing, not met)  3. Pt will increase AROM/PROM in shoulder abduction ROM to 130 degrees on left to improve functional reach, carry, push, pull pain free. (GOAL MET)  4. Pt will increase AROM/PROM in shoulder flexion to 115 degrees on right to improve functional reach, carry, push, pull pain free.(GOAL MET)        Long Term Goals: 12 weeks   1.  Pt will increase AROM/PROM in shoulder flexion to 170 degrees on right to improve functional reach, carry, push, pull pain free. (GOAL MET)  2. Pt will increase strength to 4+/5 in gross UE musculature to improve tolerance to all functional activities pain free. (progressing, not met)  3. Pt will demonstrate full/maximized tissue mobility to increase ROM and promote healthy tissue to be pain free at discharge. (progressing, not met)  4. Pt will report decrease in overall worst pain to 2/10 at discharge. (progressing, not met)  5. Pt will increase AROM/PROM in shoulder abduction ROM to 170 degrees on right to improve functional reach, carry, push, pull pain free. (GOAL MET)  6. Patient will report compliance with walking program 5x week for 20 min each day to improve overall cardiovascular function and decrease cancer related fatigue at discharge. (progressing, not met)        Plan   Outpatient physical  therapy 1-2x week for 10 weeks to include the following:   Manual Therapy, Patient Education, Self Care, Therapeutic Activities, and Therapeutic Exercise.     Plan of care Certification: 7/18/2023 to 10/10/2023        Cornelia Tian, PT

## 2023-07-18 NOTE — PLAN OF CARE
Outpatient Therapy Updated Plan of Care     Visit Date: 7/18/2023  Name: Nadia Damon  Clinic Number: [id number]    Therapy Diagnosis:   Encounter Diagnosis   Name Primary?    Decreased shoulder mobility, left Yes     Physician: Kristine Delgado, NP    Physician Orders: PT Eval and Treat   Medical Diagnosis: chronic left shoulder pain  Evaluation Date: 4/11/2023    Total Visits Received: 3  Cancelled Visits: 1  No Show Visits: 0    Current Certification Period:  4/11/2023 to 6/20/2023  Precautions:  standard  Visits from Evaluation Date:  2  Functional Level Prior to Evaluation:  no impairments prior to breast sx    Subjective     Update: has more energy since having surgery. Will be having bladder surgery 8/10 and won't be able to come back to therapy for an unknown about of time. Wears her compression sleeve daily    Objective     Update:     CMS Impairment/Limitation/Restriction for FOTO shoulder Survey    Therapist reviewed FOTO scores for Nadia Damon on 5/23/2023.   FOTO documents entered into [employer] - see Media section.    Limitation Score: 66% (eval), 50% (2nd)       Shoulder Range of Motion:   ACTIVE ROM LEFT RIGHT   Flexion 140 (decline) 180   Abduction 150 (decline) 180   Extension wnl wnl   IR/90deg Reach to bra line Reach to bra line   ER/90deg Reach to T2 Reach to T2      Strength: manual muscle test grades below      Upper Extremity Strength  Right upper extremity - 4/5 throughout  Left upper extremity - 4/5 throughout     Assessment   Nadia is progressing well with physical therapy, with moderate complaints of pain or discomfort and significant improvements noted in shoulder ROM since initial evaluation. Due to Nadia not being able to attend therapy regularly, she now shows a decline in range of motion of the left shoulder as compared to the progress note on 5/23/2023; please see exam for details. Nadia continues to have difficulty with shoulder range of motion and strength, due to late  effects of breast surgery. Her endurance is impaired due to recent onset of Covid where she was hospitalized for 20 days.  FOTO scores noted above indicate the patients perceived functional levels are improving since the evaluation. The above impairments and functional deficits will continue to be addressed over the course of this plan of care. Nadia was educated on continued progression of PT, expectations for the duration of care, updates on goals set at initial evaluation, and home exercise program.  Nadia will continue to benefit from physical therapy in order to further address goals, maximize function and quality of life.   .     hort Term goals: 6 weeks  1. Patient will demonstrate 100% understanding of lymphedema risk reduction practices to include self monitoring for lymphedema. (GOAL MET)  2. Patient will demonstrate independence with Home Exercise program established. (progressing, not met)  3. Pt will increase AROM/PROM in shoulder abduction ROM to 130 degrees on left to improve functional reach, carry, push, pull pain free. (GOAL MET)  4. Pt will increase AROM/PROM in shoulder flexion to 115 degrees on right to improve functional reach, carry, push, pull pain free.(GOAL MET)        Long Term Goals: 12 weeks   1.  Pt will increase AROM/PROM in shoulder flexion to 170 degrees on right to improve functional reach, carry, push, pull pain free. (GOAL MET)  2. Pt will increase strength to 4+/5 in gross UE musculature to improve tolerance to all functional activities pain free. (progressing, not met)  3. Pt will demonstrate full/maximized tissue mobility to increase ROM and promote healthy tissue to be pain free at discharge. (progressing, not met)  4. Pt will report decrease in overall worst pain to 2/10 at discharge. (progressing, not met)  5. Pt will increase AROM/PROM in shoulder abduction ROM to 170 degrees on right to improve functional reach, carry, push, pull pain free. (GOAL MET)  6. Patient will  report compliance with walking program 5x week for 20 min each day to improve overall cardiovascular function and decrease cancer related fatigue at discharge. (progressing, not met)       Long Term Goal Status:   continue per initial plan of care.  Reasons for Recertification of Therapy:   Pt will continue to benefit from skilled outpatient physical therapy to address the deficits listed in the problem list box on initial evaluation, provide pt/family education and to maximize pt's level of independence in the home and community environment.     Plan     Update Certification Period: 7/18/2023 to 10/10/2023  Recommended Treatment Plan: 1-2 times per week for 12 weeks: Manual Therapy, Neuromuscular Re-ed, Patient Education, Self Care, Therapeutic Exercise, and Therapeutic Activites    Cornelia Tian, PT, CLT-SHERIF      I CERTIFY THE NEED FOR THESE SERVICES FURNISHED UNDER THIS PLAN OF TREATMENT AND WHILE UNDER MY CARE    Physician's comments:        Physician's Signature: ___________________________________________________

## 2023-07-20 ENCOUNTER — TELEPHONE (OUTPATIENT)
Dept: PRIMARY CARE CLINIC | Facility: CLINIC | Age: 68
End: 2023-07-20
Payer: MEDICARE

## 2023-07-20 DIAGNOSIS — M79.7 FIBROMYALGIA: ICD-10-CM

## 2023-07-20 DIAGNOSIS — Z94.1 HEART TRANSPLANTED: Chronic | ICD-10-CM

## 2023-07-20 RX ORDER — PREGABALIN 50 MG/1
50 CAPSULE ORAL 2 TIMES DAILY
Qty: 180 CAPSULE | Refills: 0 | Status: SHIPPED | OUTPATIENT
Start: 2023-07-20 | End: 2023-10-05

## 2023-07-20 RX ORDER — CYCLOSPORINE 25 MG/1
75 CAPSULE, LIQUID FILLED ORAL 2 TIMES DAILY
Qty: 540 CAPSULE | Refills: 0 | Status: SHIPPED | OUTPATIENT
Start: 2023-07-20 | End: 2023-10-02 | Stop reason: SDUPTHER

## 2023-07-20 NOTE — TELEPHONE ENCOUNTER
Patient was contacted and informed that her two prescriptions: Lyrica 50mg and Cyclosporine Neoral 25 mg has been sent to the pharmacy for her. Patient verbally understood the information given.

## 2023-07-20 NOTE — TELEPHONE ENCOUNTER
----- Message from Rey Mohan sent at 7/20/2023  9:39 AM CDT -----  Pt called to inform us that Alethea ruiz will be reaching out regarding the listed medications and would like for us to respond regarding refilling the medication due to the patient running low.    pregabalin (LYRICA) 50 MG capsule  cycloSPORINE modified, NEORAL, (NEORAL) 25 MG

## 2023-07-24 ENCOUNTER — INFUSION (OUTPATIENT)
Dept: INFUSION THERAPY | Facility: HOSPITAL | Age: 68
End: 2023-07-24
Attending: INTERNAL MEDICINE
Payer: MEDICARE

## 2023-07-24 VITALS
DIASTOLIC BLOOD PRESSURE: 75 MMHG | TEMPERATURE: 97 F | HEART RATE: 97 BPM | SYSTOLIC BLOOD PRESSURE: 132 MMHG | RESPIRATION RATE: 18 BRPM | OXYGEN SATURATION: 98 %

## 2023-07-24 DIAGNOSIS — N18.4 ANEMIA ASSOCIATED WITH STAGE 4 CHRONIC RENAL FAILURE: Primary | ICD-10-CM

## 2023-07-24 DIAGNOSIS — M81.8 OTHER OSTEOPOROSIS WITHOUT CURRENT PATHOLOGICAL FRACTURE: ICD-10-CM

## 2023-07-24 DIAGNOSIS — D63.1 ANEMIA ASSOCIATED WITH STAGE 4 CHRONIC RENAL FAILURE: Primary | ICD-10-CM

## 2023-07-24 PROCEDURE — 96372 THER/PROPH/DIAG INJ SC/IM: CPT | Mod: HCNC

## 2023-07-24 PROCEDURE — 63600175 PHARM REV CODE 636 W HCPCS: Mod: JZ,EC,JG,HCNC

## 2023-07-24 RX ADMIN — EPOETIN ALFA-EPBX 20000 UNITS: 10000 INJECTION, SOLUTION INTRAVENOUS; SUBCUTANEOUS at 10:07

## 2023-07-24 NOTE — NURSING
Patient arrived for retacrit injection. Patient voices no concerns at this time. Labs reviewed. Vital signs and assessment documented. Retacrit 20k units administered SQ in right upper arm as documented on MAR using aseptic technique. Patient tolerated well. Band aid applied to site. Patient declined to stay for observation and denies adverse reaction with previous injections.

## 2023-07-24 NOTE — DISCHARGE INSTRUCTIONS
[facility]  [address] [facility]  [address]  [phone] phone     [phone] fax  Hours of Operation: Monday- Friday 8:00am- 5:00pm  After hours phone  [phone]  Hematology / Oncology Physicians on call      SALENA [address] Dr., NP Sydney Prescott, ONELIA Macias FNP    Please call with any concerns regarding your appointment today.

## 2023-07-27 ENCOUNTER — OFFICE VISIT (OUTPATIENT)
Dept: PRIMARY CARE CLINIC | Facility: CLINIC | Age: 68
End: 2023-07-27
Payer: MEDICARE

## 2023-07-27 VITALS
BODY MASS INDEX: 30.03 KG/M2 | OXYGEN SATURATION: 96 % | TEMPERATURE: 99 F | DIASTOLIC BLOOD PRESSURE: 71 MMHG | HEIGHT: 62 IN | SYSTOLIC BLOOD PRESSURE: 128 MMHG | WEIGHT: 163.19 LBS | HEART RATE: 93 BPM

## 2023-07-27 DIAGNOSIS — J02.9 PHARYNGITIS, UNSPECIFIED ETIOLOGY: Primary | ICD-10-CM

## 2023-07-27 DIAGNOSIS — N18.4 ANEMIA ASSOCIATED WITH STAGE 4 CHRONIC RENAL FAILURE: Chronic | ICD-10-CM

## 2023-07-27 DIAGNOSIS — E03.9 ACQUIRED HYPOTHYROIDISM: ICD-10-CM

## 2023-07-27 DIAGNOSIS — D63.1 ANEMIA ASSOCIATED WITH STAGE 4 CHRONIC RENAL FAILURE: Chronic | ICD-10-CM

## 2023-07-27 DIAGNOSIS — N18.4 CKD (CHRONIC KIDNEY DISEASE) STAGE 4, GFR 15-29 ML/MIN: Chronic | ICD-10-CM

## 2023-07-27 DIAGNOSIS — Z01.818 PREOP EXAMINATION: ICD-10-CM

## 2023-07-27 DIAGNOSIS — I10 ESSENTIAL HYPERTENSION: Chronic | ICD-10-CM

## 2023-07-27 PROCEDURE — 1126F PR PAIN SEVERITY QUANTIFIED, NO PAIN PRESENT: ICD-10-PCS | Mod: HCNC,CPTII,S$GLB, | Performed by: NURSE PRACTITIONER

## 2023-07-27 PROCEDURE — 3044F HG A1C LEVEL LT 7.0%: CPT | Mod: HCNC,CPTII,S$GLB, | Performed by: NURSE PRACTITIONER

## 2023-07-27 PROCEDURE — 3074F SYST BP LT 130 MM HG: CPT | Mod: HCNC,CPTII,S$GLB, | Performed by: NURSE PRACTITIONER

## 2023-07-27 PROCEDURE — 1101F PT FALLS ASSESS-DOCD LE1/YR: CPT | Mod: HCNC,CPTII,S$GLB, | Performed by: NURSE PRACTITIONER

## 2023-07-27 PROCEDURE — 3078F PR MOST RECENT DIASTOLIC BLOOD PRESSURE < 80 MM HG: ICD-10-PCS | Mod: HCNC,CPTII,S$GLB, | Performed by: NURSE PRACTITIONER

## 2023-07-27 PROCEDURE — 3066F PR DOCUMENTATION OF TREATMENT FOR NEPHROPATHY: ICD-10-PCS | Mod: HCNC,CPTII,S$GLB, | Performed by: NURSE PRACTITIONER

## 2023-07-27 PROCEDURE — 3074F PR MOST RECENT SYSTOLIC BLOOD PRESSURE < 130 MM HG: ICD-10-PCS | Mod: HCNC,CPTII,S$GLB, | Performed by: NURSE PRACTITIONER

## 2023-07-27 PROCEDURE — 1157F ADVNC CARE PLAN IN RCRD: CPT | Mod: HCNC,CPTII,S$GLB, | Performed by: NURSE PRACTITIONER

## 2023-07-27 PROCEDURE — 3066F NEPHROPATHY DOC TX: CPT | Mod: HCNC,CPTII,S$GLB, | Performed by: NURSE PRACTITIONER

## 2023-07-27 PROCEDURE — 1126F AMNT PAIN NOTED NONE PRSNT: CPT | Mod: HCNC,CPTII,S$GLB, | Performed by: NURSE PRACTITIONER

## 2023-07-27 PROCEDURE — 1159F MED LIST DOCD IN RCRD: CPT | Mod: HCNC,CPTII,S$GLB, | Performed by: NURSE PRACTITIONER

## 2023-07-27 PROCEDURE — 3044F PR MOST RECENT HEMOGLOBIN A1C LEVEL <7.0%: ICD-10-PCS | Mod: HCNC,CPTII,S$GLB, | Performed by: NURSE PRACTITIONER

## 2023-07-27 PROCEDURE — 1157F PR ADVANCE CARE PLAN OR EQUIV PRESENT IN MEDICAL RECORD: ICD-10-PCS | Mod: HCNC,CPTII,S$GLB, | Performed by: NURSE PRACTITIONER

## 2023-07-27 PROCEDURE — 99999 PR PBB SHADOW E&M-EST. PATIENT-LVL IV: ICD-10-PCS | Mod: PBBFAC,HCNC,, | Performed by: NURSE PRACTITIONER

## 2023-07-27 PROCEDURE — 1159F PR MEDICATION LIST DOCUMENTED IN MEDICAL RECORD: ICD-10-PCS | Mod: HCNC,CPTII,S$GLB, | Performed by: NURSE PRACTITIONER

## 2023-07-27 PROCEDURE — 3008F BODY MASS INDEX DOCD: CPT | Mod: HCNC,CPTII,S$GLB, | Performed by: NURSE PRACTITIONER

## 2023-07-27 PROCEDURE — 1101F PR PT FALLS ASSESS DOC 0-1 FALLS W/OUT INJ PAST YR: ICD-10-PCS | Mod: HCNC,CPTII,S$GLB, | Performed by: NURSE PRACTITIONER

## 2023-07-27 PROCEDURE — 99215 PR OFFICE/OUTPT VISIT, EST, LEVL V, 40-54 MIN: ICD-10-PCS | Mod: HCNC,S$GLB,, | Performed by: NURSE PRACTITIONER

## 2023-07-27 PROCEDURE — 3288F PR FALLS RISK ASSESSMENT DOCUMENTED: ICD-10-PCS | Mod: HCNC,CPTII,S$GLB, | Performed by: NURSE PRACTITIONER

## 2023-07-27 PROCEDURE — 99215 OFFICE O/P EST HI 40 MIN: CPT | Mod: HCNC,S$GLB,, | Performed by: NURSE PRACTITIONER

## 2023-07-27 PROCEDURE — 99999 PR PBB SHADOW E&M-EST. PATIENT-LVL IV: CPT | Mod: PBBFAC,HCNC,, | Performed by: NURSE PRACTITIONER

## 2023-07-27 PROCEDURE — 3078F DIAST BP <80 MM HG: CPT | Mod: HCNC,CPTII,S$GLB, | Performed by: NURSE PRACTITIONER

## 2023-07-27 PROCEDURE — 3288F FALL RISK ASSESSMENT DOCD: CPT | Mod: HCNC,CPTII,S$GLB, | Performed by: NURSE PRACTITIONER

## 2023-07-27 PROCEDURE — 3008F PR BODY MASS INDEX (BMI) DOCUMENTED: ICD-10-PCS | Mod: HCNC,CPTII,S$GLB, | Performed by: NURSE PRACTITIONER

## 2023-07-27 RX ORDER — AMOXICILLIN 500 MG/1
500 TABLET, FILM COATED ORAL EVERY 12 HOURS
Qty: 20 TABLET | Refills: 0 | Status: SHIPPED | OUTPATIENT
Start: 2023-07-27 | End: 2023-07-27

## 2023-07-27 RX ORDER — AMOXICILLIN 500 MG/1
500 TABLET, FILM COATED ORAL DAILY
Qty: 7 TABLET | Refills: 0 | Status: SHIPPED | OUTPATIENT
Start: 2023-07-27 | End: 2023-07-27 | Stop reason: SDUPTHER

## 2023-07-27 RX ORDER — AMOXICILLIN 500 MG/1
500 TABLET, FILM COATED ORAL DAILY
Qty: 7 TABLET | Refills: 0 | Status: SHIPPED | OUTPATIENT
Start: 2023-07-27 | End: 2023-08-03

## 2023-07-27 NOTE — PROGRESS NOTES
Nadia Damon  07/28/2023  [id number]    Elis Wick MD  Patient Care Team:  Elis Wick MD as PCP - General (Internal Medicine)  Miguel Soni Jr., MD as Consulting Physician (Vascular Surgery)  Ike King MD as Consulting Physician (Cardiology)  Courtney Tubbs MD as Consulting Physician (Cardiology)  Carter Crawford MD as Consulting Physician (Nephrology)  Paxton Vasques OD as Consulting Physician (Optometry)  PRANAY Villalobos MD as Obstetrician (Obstetrics)  Parker Mccarthy IV, MD (Urology)  Ike King MD as Consulting Physician (Cardiology)  Jose Roland MD as Consulting Physician (Dermatology)  Prasanth Johnson MD as Consulting Physician (Rheumatology)  Nora Morin LCSW as   Elis Wick MD as Physician (Internal Medicine)  Lexi Bianchi LCSW as  (Hematology and Oncology)      [facility] Primary Care Note      Chief Complaint:  Chief Complaint   Patient presents with    Pre-op clearance (Bladder surgery)       History of Present Illness:  HPI    Needs pre-op clearance for robotic sacrocopoplexy, Dr Villalobos. Scheduled 8/10/23.  Increased bladder incontinence.   Increased ROM left shoulder, decreased lymphedema, wears sleeve.     On Epo for anemia related to CKD.   Plans to stay overnight.     NMST - 5/24/21 no ischemia.    EKG appt.         Review of Systems   Constitutional:  Negative for activity change and appetite change.   HENT:  Negative for congestion, sinus pressure, sore throat and trouble swallowing.    Respiratory:  Negative for cough, chest tightness and shortness of breath.    Cardiovascular:  Negative for chest pain and palpitations.   Gastrointestinal:  Negative for abdominal pain, constipation and diarrhea.   Genitourinary:  Negative for difficulty urinating and dysuria.   Musculoskeletal:  Negative for arthralgias and gait problem.   Neurological:  Negative for dizziness and headaches.       The following were reviewed:  Active problem list, medication list, allergies, family history, social history, and Health Maintenance.       Medications:  Current Outpatient Medications on File Prior to Visit   Medication Sig Dispense Refill    albuterol (VENTOLIN HFA) 90 mcg/actuation inhaler Inhale 2 puffs into the lungs every 4 (four) hours as needed for Wheezing or Shortness of Breath (cough). Rescue 18 g 3    aspirin (ECOTRIN) 81 MG EC tablet Take 1 tablet (81 mg total) by mouth once daily. 90 tablet 3    biotin 10,000 mcg Cap Take 1 tablet by mouth once daily.      busPIRone (BUSPAR) 10 MG tablet TAKE 1 TABLET EVERY DAY 90 tablet 3    calcitonin, salmon, (FORTICAL) 200 unit/actuation nasal spray 1 spray by Nasal route once daily. 3 each 3    carvediloL (COREG) 6.25 MG tablet Take 1 tablet (6.25 mg total) by mouth 2 (two) times daily with meals. 180 tablet 3    cycloSPORINE modified, NEORAL, (NEORAL) 25 MG capsule Take 3 capsules (75 mg total) by mouth 2 (two) times daily. 540 capsule 0    EVENING PRIMROSE OIL ORAL Take 1,000 mg by mouth once daily.      fluticasone propionate (FLONASE) 50 mcg/actuation nasal spray One spray each nares daily x 4 weeks then as needed 16 g 0    furosemide (LASIX) 20 MG tablet Take 1 tablet (20 mg total) by mouth once daily. 90 tablet 1    guaiFENesin (MUCINEX) 600 mg 12 hr tablet Take 1,200 mg by mouth as needed.      hydrALAZINE (APRESOLINE) 50 MG tablet Take 1 tablet (50 mg total) by mouth every 8 (eight) hours. TAKE 1 TABLETS  EVERY 8  HOURS. 270 tablet 3    LIDOcaine (LIDODERM) 5 % PLACE 2 PATCHES ONTO THE SKIN ONCE DAILY. REMOVE AND DISCARD PATCHES WITHIN 12 HOURS OR AS DIRECTED BY MD Fournier patch 5    multivitamin capsule Take 1 capsule by mouth once daily.      NIFEdipine (PROCARDIA-XL) 30 MG (OSM) 24 hr tablet Take 1 tablet (30 mg total) by mouth once daily. 30 tablet 11    pantoprazole (PROTONIX) 40 MG tablet TAKE 1 TABLET EVERY DAY 90 tablet 1    polyethylene glycol (GLYCOLAX) 17 gram/dose powder Take  17 g by mouth once daily.      pregabalin (LYRICA) 50 MG capsule Take 1 capsule (50 mg total) by mouth 2 (two) times daily. 180 capsule 0    psyllium husk (METAMUCIL ORAL) Take by mouth.      temazepam (RESTORIL) 30 mg capsule TAKE 1 CAPSULE AT BEDTIME 30 capsule 5    UNABLE TO FIND medication name: Stool softener (Ducolax) Laxative      vitamin E 400 UNIT capsule Take 400 Units by mouth once daily.      zolpidem (AMBIEN) 10 mg Tab TAKE 0.5 to 1 TABLET at bedtime as needed for insomnia 30 tablet 0     No current facility-administered medications on file prior to visit.       Medications have been reviewed and reconciled with patient at visit today.    Barriers to medications present (no )    Fall since last office visit (no )      Exam:  Vitals:    07/27/23 1504   BP: 128/71   Pulse: 93   Temp: 98.7 °F (37.1 °C)     Weight: 74 kg (163 lb 3.2 oz)   Body mass index is 29.85 kg/m².      BP Readings from Last 3 Encounters:   07/27/23 128/71   07/24/23 132/75   07/13/23 (!) 142/83     Wt Readings from Last 3 Encounters:   07/27/23 1504 74 kg (163 lb 3.2 oz)   07/13/23 1224 76.1 kg (167 lb 12.3 oz)   07/05/23 1349 76.1 kg (167 lb 12.3 oz)        Physical Exam  Constitutional:       General: She is not in acute distress.     Appearance: She is not ill-appearing.   HENT:      Head: Normocephalic.      Right Ear: Tympanic membrane normal.      Left Ear: Tympanic membrane normal.      Nose: Nose normal.      Mouth/Throat:      Mouth: Mucous membranes are moist.      Pharynx: Oropharyngeal exudate present. No posterior oropharyngeal erythema.      Comments: Purulence bilateral 2+ tonsils.   Hoarseness to voice.    Eyes:      General: No scleral icterus.     Conjunctiva/sclera: Conjunctivae normal.   Cardiovascular:      Rate and Rhythm: Normal rate and regular rhythm.      Heart sounds: Normal heart sounds.   Pulmonary:      Effort: No respiratory distress.      Breath sounds: Normal breath sounds.   Abdominal:      General:  There is no distension.      Palpations: Abdomen is soft.      Tenderness: There is no abdominal tenderness.   Musculoskeletal:         General: Swelling (2+ BLE) present. No deformity.      Cervical back: No tenderness.   Lymphadenopathy:      Cervical: No cervical adenopathy.   Skin:     General: Skin is warm and dry.      Findings: No bruising.   Neurological:      General: No focal deficit present.      Mental Status: She is alert. Mental status is at baseline.      Cranial Nerves: No cranial nerve deficit.      Motor: No weakness.      Gait: Gait normal.   Psychiatric:         Mood and Affect: Mood normal.         Behavior: Behavior normal.       Laboratory Reviewed: (Yes)  Lab Results   Component Value Date    WBC 2.85 (L) 07/06/2023    HGB 9.2 (L) 07/06/2023    HCT 30.0 (L) 07/06/2023     07/06/2023    CHOL 169 06/20/2023    TRIG 97 06/20/2023    HDL 44 06/20/2023    ALT 21 07/06/2023    AST 32 07/06/2023     07/06/2023    K 4.7 07/06/2023     (H) 07/06/2023    CREATININE 2.4 (H) 07/06/2023    BUN 33 (H) 07/06/2023    CO2 21 (L) 07/06/2023    TSH 8.358 (H) 06/20/2023    PSA <0.1 05/27/2008    INR 1.0 11/22/2021    HGBA1C 5.1 03/08/2023           Health Maintenance  Health Maintenance Topics with due status: Not Due       Topic Last Completion Date    Pneumococcal Vaccines (Age 65+) 10/22/2018    TETANUS VACCINE 09/09/2020    Colorectal Cancer Screening 02/25/2021    Influenza Vaccine 11/11/2022    DEXA Scan 01/25/2023    Hemoglobin A1c (Diabetic Prevention Screening) 03/08/2023    Mammogram 04/10/2023    Lipid Panel 06/20/2023     Health Maintenance Due   Topic Date Due    COVID-19 Vaccine (4 - Booster for Moderna series) 06/22/2023         Assessment:  Problem List Items Addressed This Visit          Cardiac/Vascular    Essential hypertension (Chronic)    Relevant Orders    CBC Auto Differential    Comprehensive Metabolic Panel       Renal/    CKD (chronic kidney disease) stage 4, GFR  15-29 ml/min (Chronic)     CMP next week.   Renal dose medications.             Oncology    Anemia associated with stage 4 chronic renal failure (Chronic)     Lab Results   Component Value Date    HGB 9.2 (L) 07/06/2023   Followed by hematology.  At baseline.              Endocrine    Acquired hypothyroidism     Lab Results   Component Value Date    TSH 8.358 (H) 06/20/2023   Continue POC              Other    Preop examination     EKG, CBC, CMP.  Treat pharyngitis and recheck prior to surgery.   Message to transplant provider.           Other Visit Diagnoses       Pharyngitis, unspecified etiology    -  Primary    Relevant Medications    amoxicillin (AMOXIL) 500 MG Tab                Plan:  Pharyngitis, unspecified etiology  -     Discontinue: amoxicillin (AMOXIL) 500 MG Tab; Take 1 tablet (500 mg total) by mouth every 12 (twelve) hours. for 10 days  Dispense: 20 tablet; Refill: 0  -     Discontinue: amoxicillin (AMOXIL) 500 MG Tab; Take 1 tablet (500 mg total) by mouth once daily. for 7 days  Dispense: 7 tablet; Refill: 0  -     amoxicillin (AMOXIL) 500 MG Tab; Take 1 tablet (500 mg total) by mouth once daily. for 7 days  Dispense: 7 tablet; Refill: 0    Essential hypertension  -     CBC Auto Differential; Future; Expected date: 07/28/2023  -     Comprehensive Metabolic Panel; Future; Expected date: 07/28/2023    Preop examination    Acquired hypothyroidism    Anemia associated with stage 4 chronic renal failure    CKD (chronic kidney disease) stage 4, GFR 15-29 ml/min      -Patient's lab results were reviewed and discussed with patient  -Treatment options and alternatives were discussed with the patient. Patient expressed understanding. Patient was given the opportunity to ask questions and be an active participant in their medical care. Patient had no further questions or concerns at this time.   -Documentation of patient's health and condition was obtained from family member who was present during  visit.  -Patient is an overall moderate risk for health complications from their medical conditions.       Follow up: Follow up in about 1 week (around 8/3/2023).  Will draw lab then. EKG scheduled next week.     After visit summary printed and given to patient upon discharge.  Patient goals and care plan are included in After visit summary.    Total medical decision making time was 45 min.  The following issues were discussed: The primary encounter diagnosis was Pharyngitis, unspecified etiology. Diagnoses of Essential hypertension, Preop examination, Acquired hypothyroidism, Anemia associated with stage 4 chronic renal failure, and CKD (chronic kidney disease) stage 4, GFR 15-29 ml/min were also pertinent to this visit.    Health maintenance needs, recent test results and goals of care discussed with pt and questions answered.

## 2023-07-28 ENCOUNTER — TELEPHONE (OUTPATIENT)
Dept: HEMATOLOGY/ONCOLOGY | Facility: CLINIC | Age: 68
End: 2023-07-28
Payer: MEDICARE

## 2023-07-28 ENCOUNTER — TELEPHONE (OUTPATIENT)
Dept: TRANSPLANT | Facility: CLINIC | Age: 68
End: 2023-07-28
Payer: MEDICARE

## 2023-07-28 PROBLEM — E03.8 SUBCLINICAL HYPOTHYROIDISM: Status: RESOLVED | Noted: 2023-06-16 | Resolved: 2023-07-28

## 2023-07-28 PROBLEM — Z74.09 IMPAIRED MOBILITY: Status: RESOLVED | Noted: 2022-07-28 | Resolved: 2023-07-28

## 2023-07-28 NOTE — TELEPHONE ENCOUNTER
"Patient called to state that her bladder surgery is scheduled for 8/10. She has been placed on amoxicillin 500mg QD x7 days for "pus pockets on throat"  "

## 2023-07-28 NOTE — ASSESSMENT & PLAN NOTE
Lab Results   Component Value Date    HGB 9.2 (L) 07/06/2023   Followed by hematology.  At baseline.

## 2023-07-28 NOTE — ASSESSMENT & PLAN NOTE
EKG, CBC, CMP.  Treat pharyngitis and recheck prior to surgery.   Message to transplant provider.

## 2023-08-01 NOTE — PROGRESS NOTES
Physical Therapy/Lymphedema Daily Treatment Note     Name: Nadia Damon  Clinic Number: [id number]    Therapy Diagnosis:   Encounter Diagnosis   Name Primary?    Decreased shoulder mobility, left       Physician: Kristine Delgado NP    Visit Date: 4/26/2023       Physician Orders: PT Eval and Treat   Medical Diagnosis: chronic left shoulder pain  Evaluation Date: 4/11/2023  Authorization Period Expiration: 4/9/2024  Progress Note due: 5/11/2023  Plan of Care Certification Period: 6/20/2023  Visit #/Visits authorized: 1/ 20    Time In: 1:25 pm  Time Out: 1:22 pm  Total Billable Time: 57 minutes    Precautions: Standard    Subjective     Pt reports: no complaints.  She was compliant with home exercise program.  Response to previous treatment: good  Functional change: none at this time    Pain: 6/10  Location: left axilla      Objective     Objective Measures updated at progress report unless specified      CMS Impairment/Limitation/Restriction for FOTO shoulder Survey    Therapist reviewed FOTO scores for Nadia Damon on 4/26/2023.   FOTO documents entered into [employer] - see Media section.    Limitation Score: 66%         Treatment:     Nadia received the following self care and home management x 38 minutes    Lymphedema handout provided:  -discussed etiology of lymphedema and the lymphatic system  -discussed cellulitis and ways to decrease risk of cellulitis  -provided lymphedema resources    (See patient information for handout and information provided)    Measured left arm for compression sleeve:  19 inch length  14 inches - upper arm  10.5 inch - elbow  16 inch - wrist  (Sent link for one on [employer]; size large)    Faxed information to [employer] with insurance to see if insurance will pay for a compression sleeve      Nadia received therapeutic exercises to develop strength, ROM, flexibility, and posture for -- minutes including:   THERAPEUTIC EXERCISES:  Continue HEP of AROM, stretching, and postural  correction.     Intervention Performed Today     Supine overhead flexion      Supine chicken wing      Open books x  added windmills (performed during manual therapy)   Modified child's pose      Cat cow stretch      Quadruped rock backs                         Plan for Next Visit: Will receive soft tissue mobilization to left shoulder and updated exercises prn for the left shoulder motion and strengthening for bilateral upper extremities/shoulders        Nadia received the following manual therapy techniques applied for (19) minutes, including:        Intervention Performed Today     Cervical soft tissue mobilization      Upper trap soft tissue mobilization  x    Clavicular, intercostal soft tissue mobilization      Pec muscle soft tissue mobilization/release x    Subscapularis soft tissue mobilization/release x    Latissimus dors muscle soft tissue mobilization  x    Rhomboids soft tissue mobilization      Axillary Web Syndrome release techniques N/a                 Plan for Next Visit: manual therapy as needed         Home Exercises Provided and Patient Education Provided:   See self care section above    Written Home Exercises Provided: yes. Refer to last visit  Exercises were reviewed and Nadia was able to demonstrate them prior to the end of the session.  Nadia demonstrated good  understanding of the education provided.     Assessment     Nadia tolerated manual therapy today with minimal pressure applied. Her left arm mobility improved after manual therapy. She was educated in all aspects of lymphedema and provided with a handout of information provided. This was unable to be done at the evaluation. After education about lymphedema, she was interested in starting to wear a compression arm sleeve for preventative reasons. She was measured for a compression sleeve    Pt prognosis is Excellent.     Pt will continue to benefit from skilled outpatient physical therapy to address the deficits listed in the problem  list box on initial evaluation, provide pt/family education and to maximize pt's level of independence in the home and community environment.     Pt's spiritual, cultural and educational needs considered and pt agreeable to plan of care and goals.     Anticipated barriers to physical therapy: none    Goals:     Short Term goals: 5 weeks  1. Patient will demonstrate 100% understanding of lymphedema risk reduction practices to include self monitoring for lymphedema. (progressing, not met)  2. Patient will demonstrate independence with Home Exercise program established. (progressing, not met)  3. Pt will increase AROM/PROM in shoulder abduction ROM to 130 degrees on left to improve functional reach, carry, push, pull pain free. (progressing, not met)  4. Pt will increase AROM/PROM in shoulder flexion to 115 degrees on right to improve functional reach, carry, push, pull pain free.(progressing, not met)        Long Term Goals: 10 weeks   1.  Pt will increase AROM/PROM in shoulder flexion to 170 degrees on right to improve functional reach, carry, push, pull pain free. (progressing, not met)  2. Pt will increase strength to 4+/5 in gross UE musculature to improve tolerance to all functional activities pain free. (progressing, not met)  3. Pt will demonstrate full/maximized tissue mobility to increase ROM and promote healthy tissue to be pain free at discharge. (progressing, not met)  4. Pt will report decrease in overall worst pain to 2/10 at discharge. (progressing, not met)  5. Pt will increase AROM/PROM in shoulder abduction ROM to 170 degrees on right to improve functional reach, carry, push, pull pain free. (progressing, not met)  6. Patient will report compliance with walking program 5x week for 20 min each day to improve overall cardiovascular function and decrease cancer related fatigue at discharge. (progressing, not met)        Plan   Outpatient physical therapy 2x week for 10 weeks to include the following:    Manual Therapy, Patient Education, Self Care, Therapeutic Activities, and Therapeutic Exercise.     Plan of care Certification: 4/11/2023 to 6/20/2023.        Cornelia Tian, PT      Winlevi Pregnancy And Lactation Text: This medication is considered safe during pregnancy and breastfeeding.

## 2023-08-02 ENCOUNTER — HOSPITAL ENCOUNTER (OUTPATIENT)
Dept: CARDIOLOGY | Facility: HOSPITAL | Age: 68
Discharge: HOME | End: 2023-08-02
Payer: MEDICARE

## 2023-08-02 ENCOUNTER — OFFICE VISIT (OUTPATIENT)
Dept: OBSTETRICS AND GYNECOLOGY | Facility: CLINIC | Age: 68
End: 2023-08-02
Payer: MEDICARE

## 2023-08-02 VITALS
SYSTOLIC BLOOD PRESSURE: 124 MMHG | WEIGHT: 168.19 LBS | HEIGHT: 62 IN | DIASTOLIC BLOOD PRESSURE: 68 MMHG | BODY MASS INDEX: 30.95 KG/M2

## 2023-08-02 DIAGNOSIS — N39.3 SUI (STRESS URINARY INCONTINENCE, FEMALE): ICD-10-CM

## 2023-08-02 DIAGNOSIS — N99.3 PROLAPSE OF VAGINAL CUFF AFTER HYSTERECTOMY: Primary | ICD-10-CM

## 2023-08-02 DIAGNOSIS — N99.3 PROLAPSE OF VAGINAL CUFF AFTER HYSTERECTOMY: ICD-10-CM

## 2023-08-02 PROBLEM — F33.9 CHRONIC MAJOR DEPRESSIVE DISORDER, RECURRENT EPISODE: Status: RESOLVED | Noted: 2017-08-15 | Resolved: 2023-08-02

## 2023-08-02 PROBLEM — R05.2 SUBACUTE COUGH: Status: RESOLVED | Noted: 2023-05-03 | Resolved: 2023-08-02

## 2023-08-02 PROBLEM — Z12.39 BREAST SCREENING: Status: RESOLVED | Noted: 2017-03-02 | Resolved: 2023-08-02

## 2023-08-02 PROBLEM — U07.1 COVID: Status: RESOLVED | Noted: 2023-05-03 | Resolved: 2023-08-02

## 2023-08-02 PROBLEM — D50.9 IRON DEFICIENCY ANEMIA: Status: RESOLVED | Noted: 2017-08-15 | Resolved: 2023-08-02

## 2023-08-02 PROBLEM — Z91.81 HX OF FALLING: Status: RESOLVED | Noted: 2018-01-23 | Resolved: 2023-08-02

## 2023-08-02 PROBLEM — G89.29 CHRONIC MIDLINE LOW BACK PAIN WITHOUT SCIATICA: Status: RESOLVED | Noted: 2018-06-18 | Resolved: 2023-08-02

## 2023-08-02 PROBLEM — I27.9 PULMONARY HEART DISEASE: Status: RESOLVED | Noted: 2023-06-16 | Resolved: 2023-08-02

## 2023-08-02 PROBLEM — M25.612 DECREASED SHOULDER MOBILITY, LEFT: Status: RESOLVED | Noted: 2023-04-26 | Resolved: 2023-08-02

## 2023-08-02 PROBLEM — N60.01 CYST OF RIGHT BREAST: Status: RESOLVED | Noted: 2022-10-05 | Resolved: 2023-08-02

## 2023-08-02 PROBLEM — S46.912A STRAIN OF LEFT SHOULDER: Status: RESOLVED | Noted: 2021-12-13 | Resolved: 2023-08-02

## 2023-08-02 PROBLEM — M53.3 SACROILIAC JOINT PAIN: Status: RESOLVED | Noted: 2022-07-13 | Resolved: 2023-08-02

## 2023-08-02 PROBLEM — Z78.0 ASYMPTOMATIC MENOPAUSAL STATE: Status: RESOLVED | Noted: 2019-07-29 | Resolved: 2023-08-02

## 2023-08-02 PROBLEM — H65.91: Status: RESOLVED | Noted: 2023-06-16 | Resolved: 2023-08-02

## 2023-08-02 PROBLEM — M54.50 CHRONIC MIDLINE LOW BACK PAIN WITHOUT SCIATICA: Status: RESOLVED | Noted: 2018-06-18 | Resolved: 2023-08-02

## 2023-08-02 PROBLEM — Z28.39 IMMUNIZATION DEFICIENCY: Status: RESOLVED | Noted: 2017-03-02 | Resolved: 2023-08-02

## 2023-08-02 PROBLEM — Z01.818 PREOP EXAMINATION: Status: RESOLVED | Noted: 2018-10-22 | Resolved: 2023-08-02

## 2023-08-02 PROBLEM — R60.9 EDEMA: Status: RESOLVED | Noted: 2019-07-29 | Resolved: 2023-08-02

## 2023-08-02 PROBLEM — K59.01 SLOW TRANSIT CONSTIPATION: Status: RESOLVED | Noted: 2022-05-25 | Resolved: 2023-08-02

## 2023-08-02 PROBLEM — M46.1 SACROILIITIS: Status: RESOLVED | Noted: 2023-01-04 | Resolved: 2023-08-02

## 2023-08-02 PROBLEM — R93.429 ABNORMAL CT SCAN, KIDNEY: Status: RESOLVED | Noted: 2020-08-31 | Resolved: 2023-08-02

## 2023-08-02 PROCEDURE — 93005 ELECTROCARDIOGRAM TRACING: CPT | Mod: HCNC

## 2023-08-02 PROCEDURE — 99499 NO LOS: ICD-10-PCS | Mod: HCNC,S$GLB,, | Performed by: OBSTETRICS & GYNECOLOGY

## 2023-08-02 PROCEDURE — 99499 UNLISTED E&M SERVICE: CPT | Mod: HCNC,S$GLB,, | Performed by: OBSTETRICS & GYNECOLOGY

## 2023-08-02 PROCEDURE — 99999 PR PBB SHADOW E&M-EST. PATIENT-LVL IV: ICD-10-PCS | Mod: PBBFAC,HCNC,, | Performed by: OBSTETRICS & GYNECOLOGY

## 2023-08-02 PROCEDURE — 93010 EKG 12-LEAD: ICD-10-PCS | Mod: HCNC,,, | Performed by: INTERNAL MEDICINE

## 2023-08-02 PROCEDURE — 99999 PR PBB SHADOW E&M-EST. PATIENT-LVL IV: CPT | Mod: PBBFAC,HCNC,, | Performed by: OBSTETRICS & GYNECOLOGY

## 2023-08-02 PROCEDURE — 93010 ELECTROCARDIOGRAM REPORT: CPT | Mod: HCNC,,, | Performed by: INTERNAL MEDICINE

## 2023-08-02 RX ORDER — ACETAMINOPHEN 500 MG
1000 TABLET ORAL
Status: ACTIVE | OUTPATIENT
Start: 2023-08-02

## 2023-08-02 RX ORDER — FAMOTIDINE 20 MG/1
20 TABLET, FILM COATED ORAL
Status: ACTIVE | OUTPATIENT
Start: 2023-08-02

## 2023-08-02 RX ORDER — DEXTROSE MONOHYDRATE AND SODIUM CHLORIDE 5; .9 G/100ML; G/100ML
INJECTION, SOLUTION INTRAVENOUS CONTINUOUS
Status: CANCELLED | OUTPATIENT
Start: 2023-08-02

## 2023-08-02 NOTE — H&P (VIEW-ONLY)
Subjective:      Patient ID: Nadia Damon is a 68 y.o. female.    Chief Complaint:  Pre-op Exam      History of Present Illness  HPI  Post hysterectomy with vaginal vault prolapse and juan for surgical repair     GYN & OB History  Patient's last menstrual period was 1983 (within years).   Date of Last Pap: No result found    OB History    Para Term  AB Living   4 2 2     2   SAB IAB Ectopic Multiple Live Births                  # Outcome Date GA Lbr Tirso/2nd Weight Sex Delivery Anes PTL Lv   4 Term            3 Term            2       Vag-Spont      1       Vag-Spont          Review of Systems  Review of Systems   Constitutional:  Negative for activity change, appetite change, chills, fatigue, fever and unexpected weight change.   HENT:  Negative for mouth sores.    Eyes:  Negative for visual disturbance.   Respiratory:  Negative for cough and shortness of breath.    Cardiovascular:  Negative for chest pain and palpitations.   Gastrointestinal:  Negative for abdominal pain, bloating, blood in stool, constipation, diarrhea and nausea.   Genitourinary:  Negative for decreased libido, dyspareunia, dysuria, frequency, genital sores, hematuria, pelvic pain, urgency, vaginal discharge, urinary incontinence, postcoital bleeding, postmenopausal bleeding and vaginal odor.   Musculoskeletal:  Negative for back pain, joint swelling and myalgias.   Integumentary:  Negative for rash, breast mass, nipple discharge and breast skin changes.   Neurological:  Negative for syncope, numbness and headaches.   Hematological:  Negative for adenopathy. Does not bruise/bleed easily.   Psychiatric/Behavioral:  Negative for depression and sleep disturbance. The patient is not nervous/anxious.    Breast: Negative for mass, mastodynia, nipple discharge and skin changes         Objective:     Physical Exam:   Constitutional: She is oriented to person, place, and time. Vital signs are normal. She appears  well-developed and well-nourished. No distress.    HENT:   Head: Normocephalic and atraumatic.   Right Ear: External ear normal.   Left Ear: External ear normal.   Nose: Nose normal.    Eyes: Pupils are equal, round, and reactive to light. Conjunctivae are normal.    Neck: Trachea normal. No JVD present. No thyroid mass and no thyromegaly present.    Cardiovascular:  Normal rate and regular rhythm.             Pulmonary/Chest: Effort normal and breath sounds normal. No respiratory distress.        Abdominal: Soft. Bowel sounds are normal. She exhibits no distension and no mass. There is no abdominal tenderness. No hernia. Hernia confirmed negative in the ventral area, confirmed negative in the right inguinal area and confirmed negative in the left inguinal area.     Genitourinary:    Vagina and rectum normal.   Rectum:      No external hemorrhoid or abnormal anal tone.   Labial bartholins normal.There is no rash, tenderness or lesion on the right labia. There is no rash, tenderness or lesion on the left labia. Right adnexum displays no mass, no tenderness and no fullness. Left adnexum displays no mass, no tenderness and no fullness. There is unspecified prolapse of vaginal walls in the vagina. No  no vaginal discharge, tenderness, bleeding, rectocele or cystocele in the vagina.    No foreign body in the vagina.   Cervix is absent.Uterus is absent.           Musculoskeletal: Normal range of motion.       Neurological: She is alert and oriented to person, place, and time. She has normal reflexes.    Skin: Skin is warm and dry. She is not diaphoretic.    Psychiatric: She has a normal mood and affect. Her speech is normal and behavior is normal. Thought content normal.         Assessment:     1. 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 FRANKY (stress urinary incontinence, female)               Plan:     Nadia was seen today for pre-op exam.    Diagnoses and all orders for this visit:    Prolapse of vaginal cuff  after hysterectomy  Comments:  robotic sacral colpopexy with castorena procedure     FRANKY (stress urinary incontinence, female)

## 2023-08-02 NOTE — PROGRESS NOTES
I have explained the risks, benefits , and alternatives of robotic sacral colpopexy with castorena cystourethropexy in detail.  The patient voices understanding and all questions have been answered.  The patient agrees to proceed as planned.  Consent forms for the procedure have been reviewed and signed by the patient.

## 2023-08-03 ENCOUNTER — OFFICE VISIT (OUTPATIENT)
Dept: PRIMARY CARE CLINIC | Facility: CLINIC | Age: 68
End: 2023-08-03
Payer: MEDICARE

## 2023-08-03 ENCOUNTER — TELEPHONE (OUTPATIENT)
Dept: PREADMISSION TESTING | Facility: HOSPITAL | Age: 68
End: 2023-08-03
Payer: MEDICARE

## 2023-08-03 VITALS
SYSTOLIC BLOOD PRESSURE: 117 MMHG | DIASTOLIC BLOOD PRESSURE: 60 MMHG | BODY MASS INDEX: 30.64 KG/M2 | HEIGHT: 62 IN | OXYGEN SATURATION: 98 % | WEIGHT: 166.5 LBS | HEART RATE: 93 BPM | TEMPERATURE: 98 F

## 2023-08-03 DIAGNOSIS — Z01.818 PREOPERATIVE EXAMINATION: ICD-10-CM

## 2023-08-03 DIAGNOSIS — J02.9 PHARYNGITIS, UNSPECIFIED ETIOLOGY: Primary | ICD-10-CM

## 2023-08-03 LAB
CTP QC/QA: YES
CTP QC/QA: YES
S PYO RRNA THROAT QL PROBE: NEGATIVE
SARS-COV-2 AG RESP QL IA.RAPID: NEGATIVE

## 2023-08-03 PROCEDURE — 3074F PR MOST RECENT SYSTOLIC BLOOD PRESSURE < 130 MM HG: ICD-10-PCS | Mod: HCNC,CPTII,S$GLB, | Performed by: NURSE PRACTITIONER

## 2023-08-03 PROCEDURE — 3078F PR MOST RECENT DIASTOLIC BLOOD PRESSURE < 80 MM HG: ICD-10-PCS | Mod: HCNC,CPTII,S$GLB, | Performed by: NURSE PRACTITIONER

## 2023-08-03 PROCEDURE — 99215 OFFICE O/P EST HI 40 MIN: CPT | Mod: HCNC,S$GLB,, | Performed by: NURSE PRACTITIONER

## 2023-08-03 PROCEDURE — 3066F NEPHROPATHY DOC TX: CPT | Mod: HCNC,CPTII,S$GLB, | Performed by: NURSE PRACTITIONER

## 2023-08-03 PROCEDURE — 3078F DIAST BP <80 MM HG: CPT | Mod: HCNC,CPTII,S$GLB, | Performed by: NURSE PRACTITIONER

## 2023-08-03 PROCEDURE — 99999 PR PBB SHADOW E&M-EST. PATIENT-LVL V: CPT | Mod: PBBFAC,HCNC,, | Performed by: NURSE PRACTITIONER

## 2023-08-03 PROCEDURE — 1101F PT FALLS ASSESS-DOCD LE1/YR: CPT | Mod: HCNC,CPTII,S$GLB, | Performed by: NURSE PRACTITIONER

## 2023-08-03 PROCEDURE — 1159F MED LIST DOCD IN RCRD: CPT | Mod: HCNC,CPTII,S$GLB, | Performed by: NURSE PRACTITIONER

## 2023-08-03 PROCEDURE — 3044F HG A1C LEVEL LT 7.0%: CPT | Mod: HCNC,CPTII,S$GLB, | Performed by: NURSE PRACTITIONER

## 2023-08-03 PROCEDURE — 87811 SARS CORONAVIRUS 2 ANTIGEN POCT, MANUAL READ: ICD-10-PCS | Mod: QW,HCNC,S$GLB, | Performed by: NURSE PRACTITIONER

## 2023-08-03 PROCEDURE — 3074F SYST BP LT 130 MM HG: CPT | Mod: HCNC,CPTII,S$GLB, | Performed by: NURSE PRACTITIONER

## 2023-08-03 PROCEDURE — 1159F PR MEDICATION LIST DOCUMENTED IN MEDICAL RECORD: ICD-10-PCS | Mod: HCNC,CPTII,S$GLB, | Performed by: NURSE PRACTITIONER

## 2023-08-03 PROCEDURE — 1101F PR PT FALLS ASSESS DOC 0-1 FALLS W/OUT INJ PAST YR: ICD-10-PCS | Mod: HCNC,CPTII,S$GLB, | Performed by: NURSE PRACTITIONER

## 2023-08-03 PROCEDURE — 87880 STREP A ASSAY W/OPTIC: CPT | Mod: QW,HCNC,S$GLB, | Performed by: NURSE PRACTITIONER

## 2023-08-03 PROCEDURE — 3008F PR BODY MASS INDEX (BMI) DOCUMENTED: ICD-10-PCS | Mod: HCNC,CPTII,S$GLB, | Performed by: NURSE PRACTITIONER

## 2023-08-03 PROCEDURE — 87811 SARS-COV-2 COVID19 W/OPTIC: CPT | Mod: QW,HCNC,S$GLB, | Performed by: NURSE PRACTITIONER

## 2023-08-03 PROCEDURE — 3008F BODY MASS INDEX DOCD: CPT | Mod: HCNC,CPTII,S$GLB, | Performed by: NURSE PRACTITIONER

## 2023-08-03 PROCEDURE — 1160F PR REVIEW ALL MEDS BY PRESCRIBER/CLIN PHARMACIST DOCUMENTED: ICD-10-PCS | Mod: HCNC,CPTII,S$GLB, | Performed by: NURSE PRACTITIONER

## 2023-08-03 PROCEDURE — 1160F RVW MEDS BY RX/DR IN RCRD: CPT | Mod: HCNC,CPTII,S$GLB, | Performed by: NURSE PRACTITIONER

## 2023-08-03 PROCEDURE — 1126F AMNT PAIN NOTED NONE PRSNT: CPT | Mod: HCNC,CPTII,S$GLB, | Performed by: NURSE PRACTITIONER

## 2023-08-03 PROCEDURE — 3044F PR MOST RECENT HEMOGLOBIN A1C LEVEL <7.0%: ICD-10-PCS | Mod: HCNC,CPTII,S$GLB, | Performed by: NURSE PRACTITIONER

## 2023-08-03 PROCEDURE — 1157F ADVNC CARE PLAN IN RCRD: CPT | Mod: HCNC,CPTII,S$GLB, | Performed by: NURSE PRACTITIONER

## 2023-08-03 PROCEDURE — 99999 PR PBB SHADOW E&M-EST. PATIENT-LVL V: ICD-10-PCS | Mod: PBBFAC,HCNC,, | Performed by: NURSE PRACTITIONER

## 2023-08-03 PROCEDURE — 87880 POCT RAPID STREP A: ICD-10-PCS | Mod: QW,HCNC,S$GLB, | Performed by: NURSE PRACTITIONER

## 2023-08-03 PROCEDURE — 3288F FALL RISK ASSESSMENT DOCD: CPT | Mod: HCNC,CPTII,S$GLB, | Performed by: NURSE PRACTITIONER

## 2023-08-03 PROCEDURE — 99215 PR OFFICE/OUTPT VISIT, EST, LEVL V, 40-54 MIN: ICD-10-PCS | Mod: HCNC,S$GLB,, | Performed by: NURSE PRACTITIONER

## 2023-08-03 PROCEDURE — 1157F PR ADVANCE CARE PLAN OR EQUIV PRESENT IN MEDICAL RECORD: ICD-10-PCS | Mod: HCNC,CPTII,S$GLB, | Performed by: NURSE PRACTITIONER

## 2023-08-03 PROCEDURE — 3066F PR DOCUMENTATION OF TREATMENT FOR NEPHROPATHY: ICD-10-PCS | Mod: HCNC,CPTII,S$GLB, | Performed by: NURSE PRACTITIONER

## 2023-08-03 PROCEDURE — 1126F PR PAIN SEVERITY QUANTIFIED, NO PAIN PRESENT: ICD-10-PCS | Mod: HCNC,CPTII,S$GLB, | Performed by: NURSE PRACTITIONER

## 2023-08-03 PROCEDURE — 3288F PR FALLS RISK ASSESSMENT DOCUMENTED: ICD-10-PCS | Mod: HCNC,CPTII,S$GLB, | Performed by: NURSE PRACTITIONER

## 2023-08-03 NOTE — PROGRESS NOTES
Nadia Damon  08/03/2023  [id number]    Elis Wick MD  Patient Care Team:  Elis Wick MD as PCP - General (Internal Medicine)  Miguel Soni Jr., MD as Consulting Physician (Vascular Surgery)  Ike King MD as Consulting Physician (Cardiology)  Courtney Tubbs MD as Consulting Physician (Cardiology)  Carter Crawford MD as Consulting Physician (Nephrology)  Paxton Vasques OD as Consulting Physician (Optometry)  PRANAY Villalobos MD as Obstetrician (Obstetrics)  Parker Mccarthy IV, MD (Urology)  Ike King MD as Consulting Physician (Cardiology)  Jose Roland MD as Consulting Physician (Dermatology)  Prasanth Johnson MD as Consulting Physician (Rheumatology)  oNra Morin LCSW as   Elis Wick MD as Physician (Internal Medicine)  Lexi Bianchi LCSW as  (Hematology and Oncology)      [facility] Note      Chief Complaint:  Chief Complaint   Patient presents with    Follow-up     1 week f/u       History of Present Illness:  HPI    F/U pharyngitis. Treated with Augmentin.   Now with sore throat, 2+ tonsils and hoarseness.   Feels okay otherwise.   Scheduled for POP repair 8/10/23.   Describes significant protrusion past introitus.         Review of Systems   Constitutional:  Negative for activity change, appetite change, chills, fatigue and fever.   HENT:  Positive for postnasal drip (minimal), sore throat and voice change. Negative for congestion, ear pain, facial swelling and trouble swallowing. Mouth sores: tongue.   Respiratory:  Negative for cough, chest tightness, shortness of breath and wheezing.    Cardiovascular:  Positive for leg swelling. Negative for chest pain and palpitations.   Gastrointestinal:  Negative for abdominal pain, nausea and vomiting.   Genitourinary:  Positive for difficulty urinating (relates to POP, no recent change). Negative for dysuria.   Musculoskeletal:  Negative for arthralgias.   Skin:   Negative for rash.   Neurological:  Negative for dizziness and light-headedness.   Psychiatric/Behavioral:  Negative for sleep disturbance.          The following were reviewed: Active problem list, medication list, allergies, family history, social history, and Health Maintenance.       Medications:  Current Outpatient Medications on File Prior to Visit   Medication Sig Dispense Refill    aspirin (ECOTRIN) 81 MG EC tablet Take 1 tablet (81 mg total) by mouth once daily. 90 tablet 3    biotin 10,000 mcg Cap Take 1 tablet by mouth once daily.      busPIRone (BUSPAR) 10 MG tablet TAKE 1 TABLET EVERY DAY 90 tablet 3    cycloSPORINE modified, NEORAL, (NEORAL) 25 MG capsule Take 3 capsules (75 mg total) by mouth 2 (two) times daily. 540 capsule 0    EVENING PRIMROSE OIL ORAL Take 1,000 mg by mouth once daily.      fluticasone propionate (FLONASE) 50 mcg/actuation nasal spray One spray each nares daily x 4 weeks then as needed 16 g 0    guaiFENesin (MUCINEX) 600 mg 12 hr tablet Take 1,200 mg by mouth as needed.      hydrALAZINE (APRESOLINE) 50 MG tablet Take 1 tablet (50 mg total) by mouth every 8 (eight) hours. TAKE 1 TABLETS  EVERY 8  HOURS. 270 tablet 3    LIDOcaine (LIDODERM) 5 % PLACE 2 PATCHES ONTO THE SKIN ONCE DAILY. REMOVE AND DISCARD PATCHES WITHIN 12 HOURS OR AS DIRECTED BY  patch 5    multivitamin capsule Take 1 capsule by mouth once daily.      NIFEdipine (PROCARDIA-XL) 30 MG (OSM) 24 hr tablet Take 1 tablet (30 mg total) by mouth once daily. 30 tablet 11    pantoprazole (PROTONIX) 40 MG tablet TAKE 1 TABLET EVERY DAY 90 tablet 1    polyethylene glycol (GLYCOLAX) 17 gram/dose powder Take 17 g by mouth once daily.      pregabalin (LYRICA) 50 MG capsule Take 1 capsule (50 mg total) by mouth 2 (two) times daily. 180 capsule 0    psyllium husk (METAMUCIL ORAL) Take by mouth.      temazepam (RESTORIL) 30 mg capsule TAKE 1 CAPSULE AT BEDTIME 30 capsule 5    UNABLE TO FIND medication name: Stool softener  (Ducolax) Laxative      vitamin E 400 UNIT capsule Take 400 Units by mouth once daily.      zolpidem (AMBIEN) 10 mg Tab TAKE 0.5 to 1 TABLET at bedtime as needed for insomnia 30 tablet 0    albuterol (VENTOLIN HFA) 90 mcg/actuation inhaler Inhale 2 puffs into the lungs every 4 (four) hours as needed for Wheezing or Shortness of Breath (cough). Rescue 18 g 3    amoxicillin (AMOXIL) 500 MG Tab Take 1 tablet (500 mg total) by mouth once daily. for 7 days (Patient not taking: Reported on 8/3/2023) 7 tablet 0    calcitonin, salmon, (FORTICAL) 200 unit/actuation nasal spray 1 spray by Nasal route once daily. 3 each 3    carvediloL (COREG) 6.25 MG tablet Take 1 tablet (6.25 mg total) by mouth 2 (two) times daily with meals. 180 tablet 3    furosemide (LASIX) 20 MG tablet Take 1 tablet (20 mg total) by mouth once daily. 90 tablet 1     Current Facility-Administered Medications on File Prior to Visit   Medication Dose Route Frequency Provider Last Rate Last Admin    acetaminophen tablet 1,000 mg  1,000 mg Oral On Call Procedure PRANAY Villalobos MD        famotidine tablet 20 mg  20 mg Oral On Call Procedure PRANAY Villalobos MD           Medications have been reviewed and reconciled with patient at visit today.    Barriers to medications present (no )    Fall since last office visit (no )      Exam:  Vitals:    08/03/23 0923   BP: 117/60   Pulse: 93   Temp: 98.2 °F (36.8 °C)     Weight: 75.5 kg (166 lb 8 oz)   Body mass index is 30.45 kg/m².      BP Readings from Last 3 Encounters:   08/03/23 117/60   08/02/23 124/68   07/27/23 128/71     Wt Readings from Last 3 Encounters:   08/03/23 0923 75.5 kg (166 lb 8 oz)   08/02/23 1311 76.3 kg (168 lb 3.4 oz)   07/27/23 1504 74 kg (163 lb 3.2 oz)            Physical Exam  Constitutional:       General: She is not in acute distress.     Comments: Hoarse voice   HENT:      Nose: Nose normal. No congestion or rhinorrhea.      Mouth/Throat:      Mouth: Mucous membranes are moist.       Pharynx: No oropharyngeal exudate or posterior oropharyngeal erythema.      Comments: 2+ tonsils,   Cardiovascular:      Rate and Rhythm: Normal rate and regular rhythm.      Heart sounds:      No gallop.   Pulmonary:      Effort: Pulmonary effort is normal.      Breath sounds: Normal breath sounds.   Abdominal:      Palpations: Abdomen is soft.      Tenderness: There is no abdominal tenderness. There is no right CVA tenderness or left CVA tenderness.   Musculoskeletal:         General: Swelling (nonpitting BLE) present.      Cervical back: Normal range of motion and neck supple. No tenderness.   Lymphadenopathy:      Cervical: No cervical adenopathy.   Skin:     General: Skin is warm and dry.   Neurological:      Mental Status: She is alert. Mental status is at baseline.      Motor: No weakness.   Psychiatric:         Mood and Affect: Mood normal.         Behavior: Behavior normal.         Laboratory Reviewed: (Yes)  Lab Results   Component Value Date    WBC 3.90 08/02/2023    HGB 9.2 (L) 08/02/2023    HCT 29.5 (L) 08/02/2023     08/02/2023    CHOL 169 06/20/2023    TRIG 97 06/20/2023    HDL 44 06/20/2023    ALT 20 08/02/2023    AST 34 08/02/2023     08/02/2023    K 4.9 08/02/2023     08/02/2023    CREATININE 2.6 (H) 08/02/2023    BUN 38 (H) 08/02/2023    CO2 21 (L) 08/02/2023    TSH 8.358 (H) 06/20/2023    PSA <0.1 05/27/2008    INR 1.0 11/22/2021    HGBA1C 5.1 03/08/2023           Health Maintenance  Health Maintenance Topics with due status: Not Due       Topic Last Completion Date    Pneumococcal Vaccines (Age 65+) 10/22/2018    TETANUS VACCINE 09/09/2020    Colorectal Cancer Screening 02/25/2021    Influenza Vaccine 11/11/2022    DEXA Scan 01/25/2023    Hemoglobin A1c (Diabetic Prevention Screening) 03/08/2023    Mammogram 04/10/2023    Lipid Panel 06/20/2023     Health Maintenance Due   Topic Date Due    COVID-19 Vaccine (4 - Moderna risk series) 06/22/2023         Assessment:  Problem  List Items Addressed This Visit          ENT    Pharyngitis - Primary     Completed Augmentin, purulence no longer visible.   This immunocompromised pt is now having increased hoarseness, sore throat.  No constitutional sx.   Strep and COVID negative today.  On PPI.  Refer to ENT.            Relevant Orders    SARS Coronavirus 2 Antigen, POCT Manual Read (Completed)    POCT Rapid Strep A (Completed)    Ambulatory referral/consult to ENT     Other Visit Diagnoses       Preoperative examination        Lab, EKG at baseline. Message sent to transplant provider.             Preoperative Risk:  Based on her history of congestive heart failure and creatinine >2, the patient's calculated relative cardiac risk index score is 2 (Class III), which correlates to a 10.1% risk chance of cardiovascular complications within 30 days.      Estimated       METS: >4 - 6 - Slow swim / slow jog / walk 15 min. mile     Surgery Specific Risk: Intermediate Risk: CEA, Endovascular AAA, Head and Neck, Intraperitoneal, Intrathoracic, Orthopedic, ProstateHold Biotin now until after surgery.     Discussed above risk with pt. Holding sleeping medication (temazepam, ambien) and Lasix (furosemide) the night prior to surgery. Message sent to Dr Tubbs regarding additional recommendations.       Plan:  Pharyngitis, unspecified etiology  -     SARS Coronavirus 2 Antigen, POCT Manual Read  -     POCT Rapid Strep A  -     Ambulatory referral/consult to ENT; Future; Expected date: 08/04/2023    Preoperative examination  Comments:  Lab, EKG at baseline. Message sent to transplant provider.           -Patient's lab results were reviewed and discussed with patient  -Treatment options and alternatives were discussed with the patient. Patient expressed understanding. Patient was given the opportunity to ask questions and be an active participant in their medical care. Patient had no further questions or concerns at this time.   -Documentation of patient's  health and condition was obtained from family member who was present during visit.  -Patient is an overall moderate risk for health complications from their medical conditions.       Follow up: Follow up in about 4 weeks (around 8/31/2023).      After visit summary printed and given to patient upon discharge.  Patient goals and care plan are included in After visit summary.    Total medical decision making time was 58 min.  The following issues were discussed: The primary encounter diagnosis was Pharyngitis, unspecified etiology. A diagnosis of Preoperative examination was also pertinent to this visit.    Health maintenance needs, recent test results and goals of care discussed with pt and questions answered.

## 2023-08-03 NOTE — TELEPHONE ENCOUNTER
----- Message from Luz Dickson NP sent at 8/3/2023  2:13 PM CDT -----  Regarding: RE: surgery clearance   I'm waiting to hear from her cardiologist.   ----- Message -----  From: Abena Malik RN  Sent: 8/3/2023  10:19 AM CDT  To: Luz Dickson NP  Subject: surgery clearance                                Good morning Luz  I was just following up with you about pt's clearance to proceed with surgery on 8/10/23. Please advise on your recommendations/ optimization needed for surgery. Thanks in advance.      -Surgery Pre Admit Dept.  [phone]

## 2023-08-03 NOTE — PATIENT INSTRUCTIONS
Hold sleeping medication (temazepam, ambien) and Lasix (furosemide) the night prior to surgery.     Hold Biotin now until after surgery.     Message to Dr Tubbs regarding your surgery.

## 2023-08-03 NOTE — ASSESSMENT & PLAN NOTE
Completed Augmentin, purulence no longer visible.   This immunocompromised pt is now having increased hoarseness, sore throat.  No constitutional sx.   Strep and COVID negative today.  On PPI.  Refer to ENT.

## 2023-08-03 NOTE — Clinical Note
Good morning,  Ms. Damon is scheduled for a robotic sacrocopoplexy 8/10/23. I'm seeing her for medical clearance today. She had EKG and repeat lab yesterday, they appear at baseline. She's having some issues with pharyngitis that we are addressing with ENT. She's having no cardiac sx.  Do you have other recommendations prior to surgery?   Thank you

## 2023-08-03 NOTE — TELEPHONE ENCOUNTER
----- Message from Diane Batres RN sent at 8/3/2023 11:41 AM CDT -----  Regarding: RE: surgery clearance  Dr. Tubbs has been on vacation. I will make sure a clearance is placed in her chart today or tomorrow. I am covering this week for her transplant coordinator who is out. Sorry this was not taken care of 2 weeks ago.      ----- Message -----  From: Abena Malik RN  Sent: 8/3/2023  11:19 AM CDT  To: Diane Batres RN  Subject: RE: surgery clearance                            The original message was sent and read by Dr Tubbs on 7/21/23 at 1120 am as seen below, but I never received a response so I reached out to her staff today. I apologize if that message was not supposed to go to her. The surgery is a Urethropexy with Dr Villalobos. Due to her hx of a Heart Transplant, Anesthesia usually requires a Transplant clearance. Thank you for assisting.  ----- Message -----  From: Diane Batres RN  Sent: 8/3/2023  11:07 AM CDT  To: Abena Malik RN  Subject: FW: surgery clearance                            Good morning,    This is the first time we have received a message that pt needs a cardiac clearance. Please inform us of the type of surgery she will be having. I'll have one of the doctors see if she is cleared.    Thanks,  diane  ----- Message -----  From: Jackie Rod MA  Sent: 8/3/2023  10:31 AM CDT  To: [facility] Post-Heart Transplant Clinical  Subject: FW: surgery clearance                              ----- Message -----  From: Abena Malik RN  Sent: 8/3/2023  10:22 AM CDT  To: [facility] Staff  Subject: FW: surgery clearance                            Good morning, I was just following up on this pt's Transplant Clearance requested on 7/21/23. Please advise on whether pt may proceed with surgery on 8/10/23. Thanks.  ----- Message -----  From: Abena Malik RN  Sent: 7/21/2023  11:20 AM CDT  To: Courtney Tubbs MD  Subject: surgery clearance                                Hello    This pt will be having surgery on 8/10/23 with Dr Villalobos. We have reviewed the patient's medical history and are requesting a Transplant clearance note. Please advise on whether this patient can be cleared to proceed from your standpoint or will need any further medical therapy or testing to ensure they are optimized to proceed. Thank you in advance.      -[facility] Pre Admit Testing Dept.  [phone] or [phone]  Mon-Fri 7:30 am- 4 pm

## 2023-08-07 ENCOUNTER — OFFICE VISIT (OUTPATIENT)
Dept: OTOLARYNGOLOGY | Facility: CLINIC | Age: 68
End: 2023-08-07
Payer: MEDICARE

## 2023-08-07 ENCOUNTER — TELEPHONE (OUTPATIENT)
Dept: TRANSPLANT | Facility: CLINIC | Age: 68
End: 2023-08-07
Payer: MEDICARE

## 2023-08-07 ENCOUNTER — DOCUMENTATION ONLY (OUTPATIENT)
Dept: TRANSPLANT | Facility: CLINIC | Age: 68
End: 2023-08-07
Payer: MEDICARE

## 2023-08-07 ENCOUNTER — TELEPHONE (OUTPATIENT)
Dept: PREADMISSION TESTING | Facility: HOSPITAL | Age: 68
End: 2023-08-07
Payer: MEDICARE

## 2023-08-07 ENCOUNTER — TELEPHONE (OUTPATIENT)
Dept: HEMATOLOGY/ONCOLOGY | Facility: CLINIC | Age: 68
End: 2023-08-07
Payer: MEDICARE

## 2023-08-07 VITALS — WEIGHT: 165.81 LBS | BODY MASS INDEX: 30.32 KG/M2

## 2023-08-07 DIAGNOSIS — J04.0 LARYNGITIS: ICD-10-CM

## 2023-08-07 DIAGNOSIS — J02.9 PHARYNGITIS, UNSPECIFIED ETIOLOGY: Primary | ICD-10-CM

## 2023-08-07 PROCEDURE — 1157F ADVNC CARE PLAN IN RCRD: CPT | Mod: HCNC,CPTII,S$GLB, | Performed by: STUDENT IN AN ORGANIZED HEALTH CARE EDUCATION/TRAINING PROGRAM

## 2023-08-07 PROCEDURE — 1157F PR ADVANCE CARE PLAN OR EQUIV PRESENT IN MEDICAL RECORD: ICD-10-PCS | Mod: HCNC,CPTII,S$GLB, | Performed by: STUDENT IN AN ORGANIZED HEALTH CARE EDUCATION/TRAINING PROGRAM

## 2023-08-07 PROCEDURE — 1101F PT FALLS ASSESS-DOCD LE1/YR: CPT | Mod: HCNC,CPTII,S$GLB, | Performed by: STUDENT IN AN ORGANIZED HEALTH CARE EDUCATION/TRAINING PROGRAM

## 2023-08-07 PROCEDURE — 31575 PR LARYNGOSCOPY, FLEXIBLE; DIAGNOSTIC: ICD-10-PCS | Mod: HCNC,S$GLB,, | Performed by: STUDENT IN AN ORGANIZED HEALTH CARE EDUCATION/TRAINING PROGRAM

## 2023-08-07 PROCEDURE — 99999 PR PBB SHADOW E&M-EST. PATIENT-LVL III: CPT | Mod: PBBFAC,HCNC,, | Performed by: STUDENT IN AN ORGANIZED HEALTH CARE EDUCATION/TRAINING PROGRAM

## 2023-08-07 PROCEDURE — 1159F MED LIST DOCD IN RCRD: CPT | Mod: HCNC,CPTII,S$GLB, | Performed by: STUDENT IN AN ORGANIZED HEALTH CARE EDUCATION/TRAINING PROGRAM

## 2023-08-07 PROCEDURE — 31575 DIAGNOSTIC LARYNGOSCOPY: CPT | Mod: HCNC,S$GLB,, | Performed by: STUDENT IN AN ORGANIZED HEALTH CARE EDUCATION/TRAINING PROGRAM

## 2023-08-07 PROCEDURE — 3008F PR BODY MASS INDEX (BMI) DOCUMENTED: ICD-10-PCS | Mod: HCNC,CPTII,S$GLB, | Performed by: STUDENT IN AN ORGANIZED HEALTH CARE EDUCATION/TRAINING PROGRAM

## 2023-08-07 PROCEDURE — 1101F PR PT FALLS ASSESS DOC 0-1 FALLS W/OUT INJ PAST YR: ICD-10-PCS | Mod: HCNC,CPTII,S$GLB, | Performed by: STUDENT IN AN ORGANIZED HEALTH CARE EDUCATION/TRAINING PROGRAM

## 2023-08-07 PROCEDURE — 99999 PR PBB SHADOW E&M-EST. PATIENT-LVL III: ICD-10-PCS | Mod: PBBFAC,HCNC,, | Performed by: STUDENT IN AN ORGANIZED HEALTH CARE EDUCATION/TRAINING PROGRAM

## 2023-08-07 PROCEDURE — 3066F PR DOCUMENTATION OF TREATMENT FOR NEPHROPATHY: ICD-10-PCS | Mod: HCNC,CPTII,S$GLB, | Performed by: STUDENT IN AN ORGANIZED HEALTH CARE EDUCATION/TRAINING PROGRAM

## 2023-08-07 PROCEDURE — 3044F HG A1C LEVEL LT 7.0%: CPT | Mod: HCNC,CPTII,S$GLB, | Performed by: STUDENT IN AN ORGANIZED HEALTH CARE EDUCATION/TRAINING PROGRAM

## 2023-08-07 PROCEDURE — 3008F BODY MASS INDEX DOCD: CPT | Mod: HCNC,CPTII,S$GLB, | Performed by: STUDENT IN AN ORGANIZED HEALTH CARE EDUCATION/TRAINING PROGRAM

## 2023-08-07 PROCEDURE — 3288F FALL RISK ASSESSMENT DOCD: CPT | Mod: HCNC,CPTII,S$GLB, | Performed by: STUDENT IN AN ORGANIZED HEALTH CARE EDUCATION/TRAINING PROGRAM

## 2023-08-07 PROCEDURE — 3066F NEPHROPATHY DOC TX: CPT | Mod: HCNC,CPTII,S$GLB, | Performed by: STUDENT IN AN ORGANIZED HEALTH CARE EDUCATION/TRAINING PROGRAM

## 2023-08-07 PROCEDURE — 1159F PR MEDICATION LIST DOCUMENTED IN MEDICAL RECORD: ICD-10-PCS | Mod: HCNC,CPTII,S$GLB, | Performed by: STUDENT IN AN ORGANIZED HEALTH CARE EDUCATION/TRAINING PROGRAM

## 2023-08-07 PROCEDURE — 3044F PR MOST RECENT HEMOGLOBIN A1C LEVEL <7.0%: ICD-10-PCS | Mod: HCNC,CPTII,S$GLB, | Performed by: STUDENT IN AN ORGANIZED HEALTH CARE EDUCATION/TRAINING PROGRAM

## 2023-08-07 PROCEDURE — 99213 OFFICE O/P EST LOW 20 MIN: CPT | Mod: 25,HCNC,S$GLB, | Performed by: STUDENT IN AN ORGANIZED HEALTH CARE EDUCATION/TRAINING PROGRAM

## 2023-08-07 PROCEDURE — 3288F PR FALLS RISK ASSESSMENT DOCUMENTED: ICD-10-PCS | Mod: HCNC,CPTII,S$GLB, | Performed by: STUDENT IN AN ORGANIZED HEALTH CARE EDUCATION/TRAINING PROGRAM

## 2023-08-07 PROCEDURE — 99213 PR OFFICE/OUTPT VISIT, EST, LEVL III, 20-29 MIN: ICD-10-PCS | Mod: 25,HCNC,S$GLB, | Performed by: STUDENT IN AN ORGANIZED HEALTH CARE EDUCATION/TRAINING PROGRAM

## 2023-08-07 NOTE — TELEPHONE ENCOUNTER
Pre op instructions reviewed with pt ,verbalized understanding.    To confirm, Surgery is scheduled on 8/10/23. We will call you late afternoon the business day prior to surgery with your arrival time.    *Please report to the [facility] (1st Floor) located off of [facility] (2nd Entrance/Building on the left, in front of the flag pole).  Address: [address] [place] [address] [place] [address]      INSTRUCTIONS IMPORTANT!!!  Do Not Eat, Drink, or Smoke after 12 midnight unless instructed otherwise by your Surgeon. OK to brush teeth, no gum, candy or mints!      *Take Only these medications with a small sip of water Morning of Surgery:  -Buspar  -carvedilol  -neoral  -hydralazine  -nifedipine  -Protonix  -Lyrica      ____  HOLD all vitamins, herbal supplements, aspirin products & NSAIDS 7 days prior to surgery, as these can thin the blood.  ____  Avoid Alcoholic beverages 3 days prior to surgery, as it can thin the blood.  ____  NO Acrylic/fake nails or nail polish worn day of surgery (specifically hand/arm & foot surgeries).  ____  NO powder, lotions, deodorants, oils or cream on body.  ____  Remove all jewelry & piercings prior to surgery.  ____  Remove Dentures, Hearing Aids & Contact Lens prior to surgery.  ____  Bring photo ID and insurance information to hospital (Leave Valuables at Home).  ____  If going home the same day, arrange for a ride home. You will not be able to drive for 24 hrs if Anesthesia was used.   ____  Females (ages 11-60): may need to give a urine sample the morning of surgery; please see Pre op Nurse prior to using the restroom.  ____  Males: Stop ED medications (Viagra, Cialis) 24 hrs prior to surgery.  ____  Wear clean, loose fitting clothing to allow for dressings/ bandages.            Diabetic Patients: If you take diabetic medication, do NOT take morning of surgery unless instructed by Doctor. Metformin to be stopped 24 hrs prior to surgery time. DO NOT take long-acting  insulin the evening before surgery. Blood sugars will be checked in pre-op by Nurse.    Bathing Instructions:    -Shower with anti-bacterial Soap (Hibiclens or Dial) the night before surgery and the morning of.   -Do not use Hibiclens on your face or genitals.   -Apply clean clothes after shower.  -Do not shave your face or body 2 days prior to surgery unless instructed otherwise by your Surgeon.  -Do not shave pubic hair 7 days prior to surgery (gyn pt's).    [facility] Visitor/Ride Policy:  Only 2 adults allowed in pre op/recovery area during your procedure. You MUST HAVE A RIDE HOME from a responsible adult that you know and trust. Medical Transport, Uber or Lyft can ONLY be used if patient has a responsible adult to accompany them during ride home.    Discharge Instructions: You will receive Post-op/Discharge instructions by your Discharge Nurse prior to going home.   *Prevention of surgical site infections:   -Keep incisions clean and dry.   -Do not soak/submerge incisions in water until completely healed.   -Do not apply lotions, powders, creams, or deodorants to site.   -Always make sure hands are cleaned with antibacterial soap/ alcohol-based  prior to touching the surgical site.        *Signs and symptoms:               -Redness and pain around the area where you had surgery               -Drainage of cloudy fluid from your surgical wound               -Fever over 100.4 or chills      >>>Call Surgeon office/on-call Surgeon if you experience any of these signs & symptoms post-surgery @ [phone].       *If you are running late or have questions the morning of surgery, please call the [facility] Surgery Dept @ [phone].     *Billing questions:  [phone]       Thank you,  -[facility] Surgery Pre Admit Dept.  [phone] or [phone]  M-F 7:30 am-4:00 pm (Closed Major Holidays)

## 2023-08-07 NOTE — TELEPHONE ENCOUNTER
----- Message from Eleanor Harvey sent at 8/7/2023 10:34 AM CDT -----  Regarding: clearance  Hello, I am working on the cardiac clearance for mutual patient for robotic sacral colpopexy w/ castorena cysto urethropexy. Will this be general anesthesia? And will she be admitted or is this an outpatient procedure? Thanks. We had nurses and physicians out last week so sorry for the delay.   Eleanor THORNE, RN, CCTC, CCTN Heart transplant Coordinator

## 2023-08-07 NOTE — PROGRESS NOTES
Chief complaint:    Chief Complaint   Patient presents with    Sore Throat     Pt has hoarseness and puss bumps on the tip of tongue test negative for strep and covid            Referring Provider:  No referring provider defined for this encounter.      History of present illness:     Ms. Damon is a 68 y.o. presenting for evaluation of dysphonia, sore throat.     Onset: 1 week  ago        []  Solid dysphagia  []  Liquid dysphagia  []  Coughing/choking with swallow  []  Delayed regurgitation or vomiting  []  Tobacco exposure  []  Unintentional weight loss  []  Recurrent pneumonia  []  Globus sensation  []  Sensation of pills getting stuck  []  Heartburn or chest tightness  [x]  Voice change - hoarse over last week, has improved, but not resolved  [x]  Odynophagia  []  Otalgia  []  Neck mass  []  History of head & neck radiation  []  Neurologic disorder/symptoms    Treatment has included: antibiotics x 2    Has upcoming surgery and wants to be sure larynx/throat clear for intubation.          History      Past Medical History:   Past Medical History:   Diagnosis Date    Abdominal wall hernia     CT Renal 6/11/2018---Small fat containing superior ventral abdominal wall hernia at the epicardial pacing lead site.    Abnormal mammogram 10/12/2021    GRACE (acute kidney injury) 11/22/2021    Anxiety     Arthritis     ZEN HIPS    Breast cancer in female 08/2021    LEFT BREAST    C. difficile colitis 11/29/2021    Cellulitis of axilla, left 12/23/2021    Chronic diastolic heart failure 12/16/2021    Chronic kidney disease     stage 4, GFR 15-29 ml/min    Chronic midline low back pain without sciatica 06/18/2018    Closed nondisplaced fracture of distal phalanx of left great toe with routine healing 10/22/2018    Coronary artery disease 1993    heart transplant    Cystitis 05/10/2022    Depression     Encounter for blood transfusion     Fibromyalgia     on Lyrica    Heart failure     native heart cardiomyopathy    Heart  transplanted 1993    due to cardiomyopathy    History of hyperparathyroidism; Hyperparathyroidism, secondary renal     PT DENIES    Hypertension     Immune disorder     anti rejection meds    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    Impaired mobility 07/28/2022    Iron deficiency anemia 08/15/2017    Kidney stones     passed per pt    Obesity     Other osteoporosis without current pathological fracture 08/30/2019    Severe sepsis 11/22/2021    Shingles 2003 approx    left leg    Subclinical hypothyroidism 06/16/2023    Thrombocytopenia, unspecified 11/29/2021    Trouble in sleeping     Urinary incontinence          Past Surgical History:  Past Surgical History:   Procedure Laterality Date    BLADDER SURGERY  2015 approx    mesh - Dr Everett then 2nd reconstructive sx Dr Onofre    BREAST BIOPSY Bilateral     NEGATIVE    BREAST BIOPSY Right 10/31/2022    benign    BREAST LUMPECTOMY Left 2021    BREAST SURGERY Left 09/28/2015    Bx - benign    BREAST SURGERY Right 12/2015    Bx benign    CARDIAC PACEMAKER REMOVAL Left 06/26/2014    Pacer defirillator removed. Put in 1993 aat time of heart transplant    CARPAL TUNNEL RELEASE Left 03/03/2015    Dr. Hall    COLONOSCOPY N/A 02/25/2021    Procedure: COLONOSCOPY;  Surgeon: Freida Ramirez MD;  Location: [facility];  Service: Endoscopy;  Laterality: N/A;    CYSTOCELE REPAIR      Twice with mesh removal    EPIDURAL STEROID INJECTION INTO CERVICAL SPINE N/A 02/02/2023    Procedure: T11/T12 IL HELLEN;  Surgeon: Jassi Pierre MD;  Location: [facility];  Service: Pain Management;  Laterality: N/A;    HEART TRANSPLANT  1993    HERNIA REPAIR Right 1971 approx    Inguinal    HYSTERECTOMY  1983    vag hyst /LSO     INCISION AND DRAINAGE OF ABSCESS Left 12/24/2021    Procedure: INCISION AND DRAINAGE, ABSCESS;  Surgeon: Joseph Longo MD;  Location: [facility];  Service: General;  Laterality: Left;    INJECTION OF ANESTHETIC AGENT AROUND MEDIAL BRANCH NERVES  INNERVATING LUMBAR FACET JOINT Right 10/19/2022    Procedure: Right L4/L5 and L5/S1 MBB;  Surgeon: Jassi Pierre MD;  Location: [facility] PAIN [facility]GT;  Service: Pain Management;  Laterality: Right;    INJECTION OF ANESTHETIC AGENT AROUND MEDIAL BRANCH NERVES INNERVATING LUMBAR FACET JOINT Right 11/09/2022    Procedure: Right L4/L5 and L5/S1 MBB;  Surgeon: Jassi Pierre MD;  [facility];  Service: Pain Management;  Laterality: Right;    INJECTION OF ANESTHETIC AGENT INTO SACROILIAC JOINT Right 08/22/2022    Procedure: Right SIJ Injection Right L5/S1 Facte Injection;  Surgeon: Jassi Pierre MD;  Location: [facility] PAIN [facility]GT;  Service: Pain Management;  Laterality: Right;    INSERTION OF TUNNELED CENTRAL VENOUS CATHETER (CVC) WITH SUBCUTANEOUS PORT N/A 11/09/2021    Procedure: [id number]-S-CATH;  Surgeon: Christoph Douglas MD;  Location: [facility] OR;  Service: General;  Laterality: N/A;    RADIOFREQUENCY THERMOCOAGULATION Right 12/07/2022    Procedure: Right L4/L5 and L5/S1 Lumbar RFA;  Surgeon: Jassi Pierre MD;  Location: [facility] PAIN [facility]GT;  Service: Pain Management;  Laterality: Right;    REMOVAL OF VASCULAR ACCESS PORT      SENTINEL LYMPH NODE BIOPSY Left 10/12/2021    Procedure: BIOPSY, LYMPH NODE, SENTINEL;  Surgeon: Christoph Douglas MD;  Location: [facility] OR;  Service: General;  Laterality: Left;    TOE SURGERY           Medications: Medication list reviewed. She  has a current medication list which includes the following prescription(s): biotin, carvedilol, cyclosporine modified (neoral), hydralazine, pantoprazole, pregabalin, albuterol, aspirin, buspirone, calcitonin (salmon), evening primrose oil, fluticasone propionate, furosemide, guaifenesin, lidocaine, multivitamin, nifedipine, polyethylene glycol, psyllium husk, temazepam, UNABLE TO FIND, vitamin e, and zolpidem, and the following Facility-Administered Medications: acetaminophen and famotidine.     Allergies:   Review of patient's allergies indicates:    Allergen Reactions    Lisinopril Swelling and Rash    Augmentin [amoxicillin-pot clavulanate] Diarrhea         Family history: family history includes Breast cancer in her maternal grandmother and mother; Cancer (age of onset: 38) in her mother; Cataracts in her cousin; Heart disease in her maternal grandmother; Hypertension in her son.         Social History          Alcohol use:  reports no history of alcohol use.            Tobacco:  reports that she has never smoked. She has never used smokeless tobacco.         Physical Examination      Vitals: Weight 75.2 kg (165 lb 12.6 oz), last menstrual period 06/20/1983.      General: Well developed, well nourished, well hydrated.   Voice: no hoarseness, no dysarthria      Head/Face: Normocephalic, atraumatic. No scars or lesions. Facial musculature equal.     Eyes: No scleral icterus or conjunctival hemorrhage. EOMI. PERRLA.     Ears:     Right ear: No gross deformity. EAC is clear of debris and erythema. TM are intact with a pneumatized middle ear. No signs of retraction, fluid or infection.      Left ear: No gross deformity. EAC is clear of debris and erythema. TM are intact with a pneumatized middle ear. No signs of retraction, fluid or infection.      Nose: No gross deformity or lesions. No purulent discharge. No significant NSD.      Mouth/Oropharynx: Lips without any lesions. No mucosal lesions within the oropharynx. No tonsillar exudate or lesions, 2+.  Pharyngeal walls symmetrical. Uvula midline. Tongue midline without lesions.     Neck: Trachea midline. No masses. No thyromegaly or nodules palpated.     Lymphatic: No lymphadenopathy in the neck.     Extremities: No cyanosis. Warm and well-perfused.     Skin: No scars or lesions on face or neck.      Neurologic: Moving all extremities without gross abnormality.CN II-XII grossly intact. House-Brackmann 1/6. No signs of nystagmus.          Data reviewed      Review of records:      I reviewed records from the  referring provider's office visits.  These describe the history, workup, and/or treatment of this problem thus far.          Procedures:    Procedure -Transnasal fiberoptic laryngoscopy     Surgeon: Guicho Godinez M.D. .      Anesthesia: topical 0.05% oxymetazoline with 4% lidocaine      Complications: None.     Description of Procedure: With the patient in the sitting position, topical lidocaine and oxymetazoline was applied to the nose. The scope was passed through the nose. Examination was carried out of the nose, nasopharynx, oropharynx, hypopharynx, and larynx with findings as noted above. Scope was removed. The patient tolerated the procedure well.      Findings: No masses or lesions in the nose, nasopharynx, oropharynx, hypopharynx, or larynx. Vocal fold abduction and adduction is normal. No pooling of secretions in the piriform sinuses, penetration, or aspiration.        Assessment/Plan:      1. Pharyngitis, unspecified etiology    2. Laryngitis           I recommend she finish abx as prescribed. Recommend saline gargles for voice.   Ok for intubation. Discussed her dysphonia may worse after intubation temporarily  If still persistent 2 weeks post-op she will return        Guicho Godinez MD  [facility]   [facility] - [address]  [place] [address]  P: [phone]  F: [phone]

## 2023-08-07 NOTE — TELEPHONE ENCOUNTER
SW canceled 8.10.23 Lyft ride per pt request. Pt stated that she will have to have her family accompany her to appt. No other needs expressed. SW will remain available.

## 2023-08-09 ENCOUNTER — ANESTHESIA EVENT (OUTPATIENT)
Dept: SURGERY | Facility: HOSPITAL | Age: 68
End: 2023-08-09
Payer: MEDICARE

## 2023-08-09 ENCOUNTER — TELEPHONE (OUTPATIENT)
Dept: PREADMISSION TESTING | Facility: HOSPITAL | Age: 68
End: 2023-08-09
Payer: MEDICARE

## 2023-08-09 NOTE — TELEPHONE ENCOUNTER
Called and spoke with PATIENT about the following:     Please arrive to [facility] at 8AM on 8/10/23 for your scheduled procedure.  Address: [address] [place] [address] [place] [address] (2nd Building on left, 1st Floor Lobby)  >>>NO eating or drinking after midnight unless instructed otherwise by your Surgeon<<<    Thank you,  -[facility].  Mon-Fri 7:30 am - 4 pm [phone]

## 2023-08-09 NOTE — PRE ADMISSION SCREENING
Chart reviewed by Pre Admit Provider Tanisha Alvarez NP. Pt was seen for pre op visit by her PCP, pt does not need to be seen in [facility]. Pt met with me in person to go over surgery instructions and medical history.

## 2023-08-10 ENCOUNTER — HOSPITAL ENCOUNTER (OUTPATIENT)
Facility: HOSPITAL | Age: 68
Discharge: HOME | End: 2023-08-11
Attending: OBSTETRICS & GYNECOLOGY | Admitting: OBSTETRICS & GYNECOLOGY
Payer: MEDICARE

## 2023-08-10 ENCOUNTER — ANESTHESIA (OUTPATIENT)
Dept: SURGERY | Facility: HOSPITAL | Age: 68
End: 2023-08-10
Payer: MEDICARE

## 2023-08-10 DIAGNOSIS — N39.3 SUI (STRESS URINARY INCONTINENCE, FEMALE): ICD-10-CM

## 2023-08-10 DIAGNOSIS — N99.3 PROLAPSE OF VAGINAL CUFF AFTER HYSTERECTOMY: ICD-10-CM

## 2023-08-10 LAB
ABO + RH BLD: NORMAL
BLD GP AB SCN CELLS X3 SERPL QL: NORMAL

## 2023-08-10 PROCEDURE — 88307 TISSUE EXAM BY PATHOLOGIST: CPT | Mod: HCNC | Performed by: PATHOLOGY

## 2023-08-10 PROCEDURE — C1763 CONN TISS, NON-HUMAN: HCPCS | Mod: HCNC | Performed by: OBSTETRICS & GYNECOLOGY

## 2023-08-10 PROCEDURE — 25000003 PHARM REV CODE 250: Mod: HCNC | Performed by: FAMILY MEDICINE

## 2023-08-10 PROCEDURE — 63600175 PHARM REV CODE 636 W HCPCS: Mod: HCNC | Performed by: FAMILY MEDICINE

## 2023-08-10 PROCEDURE — 88307 PR  SURG PATH,LEVEL V: ICD-10-PCS | Mod: 26,HCNC,, | Performed by: PATHOLOGY

## 2023-08-10 PROCEDURE — 51990 PR LAP,URETHRAL SUSPENSION: ICD-10-PCS | Mod: AS,51,, | Performed by: PHYSICIAN ASSISTANT

## 2023-08-10 PROCEDURE — 57425 PR LAPAROSCOPY, SURG, COLPOPEXY: ICD-10-PCS | Mod: AS,,, | Performed by: PHYSICIAN ASSISTANT

## 2023-08-10 PROCEDURE — 36000712 HC OR TIME LEV V 1ST 15 MIN: Mod: HCNC | Performed by: OBSTETRICS & GYNECOLOGY

## 2023-08-10 PROCEDURE — 63600175 PHARM REV CODE 636 W HCPCS: Mod: HCNC | Performed by: OBSTETRICS & GYNECOLOGY

## 2023-08-10 PROCEDURE — 58661 LAPAROSCOPY REMOVE ADNEXA: CPT | Mod: 51,RT,, | Performed by: OBSTETRICS & GYNECOLOGY

## 2023-08-10 PROCEDURE — 88302 PR  SURG PATH,LEVEL II: ICD-10-PCS | Mod: 26,HCNC,, | Performed by: PATHOLOGY

## 2023-08-10 PROCEDURE — 25000003 PHARM REV CODE 250: Mod: HCNC | Performed by: OBSTETRICS & GYNECOLOGY

## 2023-08-10 PROCEDURE — 57425 LAPAROSCOPY SURG COLPOPEXY: CPT | Mod: AS,,, | Performed by: PHYSICIAN ASSISTANT

## 2023-08-10 PROCEDURE — 58661 PR LAP,RMV  ADNEXAL STRUCTURE: ICD-10-PCS | Mod: AS,51,RT, | Performed by: PHYSICIAN ASSISTANT

## 2023-08-10 PROCEDURE — 51990 PR LAP,URETHRAL SUSPENSION: ICD-10-PCS | Mod: 51,,, | Performed by: OBSTETRICS & GYNECOLOGY

## 2023-08-10 PROCEDURE — 58661 LAPAROSCOPY REMOVE ADNEXA: CPT | Mod: AS,51,RT, | Performed by: PHYSICIAN ASSISTANT

## 2023-08-10 PROCEDURE — 37000008 HC ANESTHESIA 1ST 15 MINUTES: Mod: HCNC | Performed by: OBSTETRICS & GYNECOLOGY

## 2023-08-10 PROCEDURE — 63600175 PHARM REV CODE 636 W HCPCS: Mod: HCNC | Performed by: STUDENT IN AN ORGANIZED HEALTH CARE EDUCATION/TRAINING PROGRAM

## 2023-08-10 PROCEDURE — 71000039 HC RECOVERY, EACH ADD'L HOUR: Mod: HCNC | Performed by: OBSTETRICS & GYNECOLOGY

## 2023-08-10 PROCEDURE — 88307 TISSUE EXAM BY PATHOLOGIST: CPT | Mod: 26,HCNC,, | Performed by: PATHOLOGY

## 2023-08-10 PROCEDURE — 88302 TISSUE EXAM BY PATHOLOGIST: CPT | Mod: HCNC | Performed by: PATHOLOGY

## 2023-08-10 PROCEDURE — 94761 N-INVAS EAR/PLS OXIMETRY MLT: CPT | Mod: HCNC

## 2023-08-10 PROCEDURE — 86900 BLOOD TYPING SEROLOGIC ABO: CPT | Mod: HCNC | Performed by: STUDENT IN AN ORGANIZED HEALTH CARE EDUCATION/TRAINING PROGRAM

## 2023-08-10 PROCEDURE — 27201423 OPTIME MED/SURG SUP & DEVICES STERILE SUPPLY: Mod: HCNC | Performed by: OBSTETRICS & GYNECOLOGY

## 2023-08-10 PROCEDURE — 71000033 HC RECOVERY, INTIAL HOUR: Mod: HCNC | Performed by: OBSTETRICS & GYNECOLOGY

## 2023-08-10 PROCEDURE — 58661 PR LAP,RMV  ADNEXAL STRUCTURE: ICD-10-PCS | Mod: 51,RT,, | Performed by: OBSTETRICS & GYNECOLOGY

## 2023-08-10 PROCEDURE — 51990 LAPARO URETHRAL SUSPENSION: CPT | Mod: 51,,, | Performed by: OBSTETRICS & GYNECOLOGY

## 2023-08-10 PROCEDURE — 88302 TISSUE EXAM BY PATHOLOGIST: CPT | Mod: 26,HCNC,, | Performed by: PATHOLOGY

## 2023-08-10 PROCEDURE — 37000009 HC ANESTHESIA EA ADD 15 MINS: Mod: HCNC | Performed by: OBSTETRICS & GYNECOLOGY

## 2023-08-10 PROCEDURE — 36000713 HC OR TIME LEV V EA ADD 15 MIN: Mod: HCNC | Performed by: OBSTETRICS & GYNECOLOGY

## 2023-08-10 PROCEDURE — 57425 LAPAROSCOPY SURG COLPOPEXY: CPT | Mod: ,,, | Performed by: OBSTETRICS & GYNECOLOGY

## 2023-08-10 PROCEDURE — S5010 5% DEXTROSE AND 0.45% SALINE: HCPCS | Mod: HCNC | Performed by: OBSTETRICS & GYNECOLOGY

## 2023-08-10 PROCEDURE — 51990 LAPARO URETHRAL SUSPENSION: CPT | Mod: AS,51,, | Performed by: PHYSICIAN ASSISTANT

## 2023-08-10 PROCEDURE — 57425 PR LAPAROSCOPY, SURG, COLPOPEXY: ICD-10-PCS | Mod: ,,, | Performed by: OBSTETRICS & GYNECOLOGY

## 2023-08-10 PROCEDURE — 94799 UNLISTED PULMONARY SVC/PX: CPT | Mod: HCNC

## 2023-08-10 DEVICE — TRADITIONAL Y MESH
Type: IMPLANTABLE DEVICE | Site: ABDOMEN | Status: FUNCTIONAL
Brand: UPSYLON™

## 2023-08-10 RX ORDER — MEPERIDINE HYDROCHLORIDE 25 MG/ML
12.5 INJECTION INTRAMUSCULAR; INTRAVENOUS; SUBCUTANEOUS ONCE AS NEEDED
Status: COMPLETED | OUTPATIENT
Start: 2023-08-10 | End: 2023-08-10

## 2023-08-10 RX ORDER — HYDRALAZINE HYDROCHLORIDE 50 MG/1
50 TABLET, FILM COATED ORAL EVERY 8 HOURS
Status: DISCONTINUED | OUTPATIENT
Start: 2023-08-10 | End: 2023-08-11 | Stop reason: HOSPADM

## 2023-08-10 RX ORDER — PHENYLEPHRINE HYDROCHLORIDE 10 MG/ML
INJECTION INTRAVENOUS
Status: DISCONTINUED | OUTPATIENT
Start: 2023-08-10 | End: 2023-08-10

## 2023-08-10 RX ORDER — HYDROMORPHONE HYDROCHLORIDE 1 MG/ML
1 INJECTION, SOLUTION INTRAMUSCULAR; INTRAVENOUS; SUBCUTANEOUS EVERY 4 HOURS PRN
Status: DISCONTINUED | OUTPATIENT
Start: 2023-08-10 | End: 2023-08-11 | Stop reason: HOSPADM

## 2023-08-10 RX ORDER — PREGABALIN 25 MG/1
50 CAPSULE ORAL 2 TIMES DAILY
Status: DISCONTINUED | OUTPATIENT
Start: 2023-08-10 | End: 2023-08-11 | Stop reason: HOSPADM

## 2023-08-10 RX ORDER — DEXAMETHASONE SODIUM PHOSPHATE 4 MG/ML
INJECTION, SOLUTION INTRA-ARTICULAR; INTRALESIONAL; INTRAMUSCULAR; INTRAVENOUS; SOFT TISSUE
Status: DISCONTINUED | OUTPATIENT
Start: 2023-08-10 | End: 2023-08-10

## 2023-08-10 RX ORDER — PROCHLORPERAZINE EDISYLATE 5 MG/ML
5 INJECTION INTRAMUSCULAR; INTRAVENOUS EVERY 6 HOURS PRN
Status: DISCONTINUED | OUTPATIENT
Start: 2023-08-10 | End: 2023-08-11 | Stop reason: HOSPADM

## 2023-08-10 RX ORDER — HYDROCODONE BITARTRATE AND ACETAMINOPHEN 5; 325 MG/1; MG/1
1 TABLET ORAL EVERY 4 HOURS PRN
Status: DISCONTINUED | OUTPATIENT
Start: 2023-08-10 | End: 2023-08-11 | Stop reason: HOSPADM

## 2023-08-10 RX ORDER — ROCURONIUM BROMIDE 10 MG/ML
INJECTION, SOLUTION INTRAVENOUS
Status: DISCONTINUED | OUTPATIENT
Start: 2023-08-10 | End: 2023-08-10

## 2023-08-10 RX ORDER — CEFAZOLIN SODIUM 2 G/50ML
2 SOLUTION INTRAVENOUS
Status: DISCONTINUED | OUTPATIENT
Start: 2023-08-10 | End: 2023-08-10 | Stop reason: HOSPADM

## 2023-08-10 RX ORDER — ONDANSETRON 2 MG/ML
4 INJECTION INTRAMUSCULAR; INTRAVENOUS DAILY PRN
Status: DISCONTINUED | OUTPATIENT
Start: 2023-08-10 | End: 2023-08-10 | Stop reason: HOSPADM

## 2023-08-10 RX ORDER — DEXTROSE MONOHYDRATE AND SODIUM CHLORIDE 5; .45 G/100ML; G/100ML
INJECTION, SOLUTION INTRAVENOUS CONTINUOUS
Status: DISCONTINUED | OUTPATIENT
Start: 2023-08-10 | End: 2023-08-11 | Stop reason: HOSPADM

## 2023-08-10 RX ORDER — LIDOCAINE HYDROCHLORIDE 20 MG/ML
INJECTION, SOLUTION EPIDURAL; INFILTRATION; INTRACAUDAL; PERINEURAL
Status: DISCONTINUED | OUTPATIENT
Start: 2023-08-10 | End: 2023-08-10

## 2023-08-10 RX ORDER — ZOLPIDEM TARTRATE 5 MG/1
5 TABLET ORAL NIGHTLY
Status: DISCONTINUED | OUTPATIENT
Start: 2023-08-10 | End: 2023-08-11 | Stop reason: HOSPADM

## 2023-08-10 RX ORDER — FUROSEMIDE 20 MG/1
20 TABLET ORAL DAILY
Status: DISCONTINUED | OUTPATIENT
Start: 2023-08-11 | End: 2023-08-11 | Stop reason: HOSPADM

## 2023-08-10 RX ORDER — DEXTROSE MONOHYDRATE AND SODIUM CHLORIDE 5; .9 G/100ML; G/100ML
INJECTION, SOLUTION INTRAVENOUS CONTINUOUS
Status: DISCONTINUED | OUTPATIENT
Start: 2023-08-10 | End: 2023-08-10

## 2023-08-10 RX ORDER — HYDROMORPHONE HYDROCHLORIDE 2 MG/ML
0.2 INJECTION, SOLUTION INTRAMUSCULAR; INTRAVENOUS; SUBCUTANEOUS EVERY 5 MIN PRN
Status: DISCONTINUED | OUTPATIENT
Start: 2023-08-10 | End: 2023-08-10 | Stop reason: HOSPADM

## 2023-08-10 RX ORDER — PANTOPRAZOLE SODIUM 40 MG/1
40 TABLET, DELAYED RELEASE ORAL DAILY
Status: DISCONTINUED | OUTPATIENT
Start: 2023-08-11 | End: 2023-08-11 | Stop reason: HOSPADM

## 2023-08-10 RX ORDER — MIDAZOLAM HYDROCHLORIDE 1 MG/ML
INJECTION INTRAMUSCULAR; INTRAVENOUS
Status: DISCONTINUED | OUTPATIENT
Start: 2023-08-10 | End: 2023-08-10

## 2023-08-10 RX ORDER — ONDANSETRON 8 MG/1
8 TABLET, ORALLY DISINTEGRATING ORAL EVERY 8 HOURS PRN
Status: DISCONTINUED | OUTPATIENT
Start: 2023-08-10 | End: 2023-08-11 | Stop reason: HOSPADM

## 2023-08-10 RX ORDER — OXYCODONE AND ACETAMINOPHEN 5; 325 MG/1; MG/1
1 TABLET ORAL
Status: DISCONTINUED | OUTPATIENT
Start: 2023-08-10 | End: 2023-08-10 | Stop reason: HOSPADM

## 2023-08-10 RX ORDER — EPHEDRINE SULFATE 50 MG/ML
INJECTION, SOLUTION INTRAVENOUS
Status: DISCONTINUED | OUTPATIENT
Start: 2023-08-10 | End: 2023-08-10

## 2023-08-10 RX ORDER — FENTANYL CITRATE 50 UG/ML
INJECTION, SOLUTION INTRAMUSCULAR; INTRAVENOUS
Status: DISCONTINUED | OUTPATIENT
Start: 2023-08-10 | End: 2023-08-10

## 2023-08-10 RX ORDER — PROPOFOL 10 MG/ML
VIAL (ML) INTRAVENOUS
Status: DISCONTINUED | OUTPATIENT
Start: 2023-08-10 | End: 2023-08-10

## 2023-08-10 RX ORDER — DIPHENHYDRAMINE HCL 25 MG
25 CAPSULE ORAL EVERY 4 HOURS PRN
Status: DISCONTINUED | OUTPATIENT
Start: 2023-08-10 | End: 2023-08-11 | Stop reason: HOSPADM

## 2023-08-10 RX ORDER — CARVEDILOL 6.25 MG/1
6.25 TABLET ORAL 2 TIMES DAILY WITH MEALS
Status: DISCONTINUED | OUTPATIENT
Start: 2023-08-10 | End: 2023-08-11 | Stop reason: HOSPADM

## 2023-08-10 RX ADMIN — CARVEDILOL 6.25 MG: 6.25 TABLET, FILM COATED ORAL at 05:08

## 2023-08-10 RX ADMIN — HYDROMORPHONE HYDROCHLORIDE 0.2 MG: 2 INJECTION INTRAMUSCULAR; INTRAVENOUS; SUBCUTANEOUS at 01:08

## 2023-08-10 RX ADMIN — FENTANYL CITRATE 25 MCG: 0.05 INJECTION, SOLUTION INTRAMUSCULAR; INTRAVENOUS at 12:08

## 2023-08-10 RX ADMIN — MIDAZOLAM HYDROCHLORIDE 1 MG: 1 INJECTION, SOLUTION INTRAMUSCULAR; INTRAVENOUS at 09:08

## 2023-08-10 RX ADMIN — MEPERIDINE HYDROCHLORIDE 12.5 MG: 25 INJECTION INTRAMUSCULAR; INTRAVENOUS; SUBCUTANEOUS at 01:08

## 2023-08-10 RX ADMIN — CEFAZOLIN SODIUM 2 G: 2 SOLUTION INTRAVENOUS at 09:08

## 2023-08-10 RX ADMIN — FENTANYL CITRATE 50 MCG: 0.05 INJECTION, SOLUTION INTRAMUSCULAR; INTRAVENOUS at 09:08

## 2023-08-10 RX ADMIN — ROCURONIUM BROMIDE 20 MG: 10 SOLUTION INTRAVENOUS at 10:08

## 2023-08-10 RX ADMIN — PHENYLEPHRINE HYDROCHLORIDE 100 MCG: 10 INJECTION INTRAVENOUS at 09:08

## 2023-08-10 RX ADMIN — HYDROCODONE BITARTRATE AND ACETAMINOPHEN 1 TABLET: 5; 325 TABLET ORAL at 09:08

## 2023-08-10 RX ADMIN — LIDOCAINE HYDROCHLORIDE 80 MG: 20 INJECTION, SOLUTION EPIDURAL; INFILTRATION; INTRACAUDAL; PERINEURAL at 09:08

## 2023-08-10 RX ADMIN — ROCURONIUM BROMIDE 50 MG: 10 SOLUTION INTRAVENOUS at 09:08

## 2023-08-10 RX ADMIN — PHENYLEPHRINE HYDROCHLORIDE 100 MCG: 10 INJECTION INTRAVENOUS at 10:08

## 2023-08-10 RX ADMIN — DEXTROSE AND SODIUM CHLORIDE: 5; 450 INJECTION, SOLUTION INTRAVENOUS at 05:08

## 2023-08-10 RX ADMIN — SODIUM CHLORIDE, SODIUM LACTATE, POTASSIUM CHLORIDE, AND CALCIUM CHLORIDE: .6; .31; .03; .02 INJECTION, SOLUTION INTRAVENOUS at 09:08

## 2023-08-10 RX ADMIN — ONDANSETRON 4 MG: 2 INJECTION INTRAMUSCULAR; INTRAVENOUS at 02:08

## 2023-08-10 RX ADMIN — HYDROMORPHONE HYDROCHLORIDE 0.2 MG: 2 INJECTION INTRAMUSCULAR; INTRAVENOUS; SUBCUTANEOUS at 02:08

## 2023-08-10 RX ADMIN — ZOLPIDEM TARTRATE 5 MG: 5 TABLET ORAL at 09:08

## 2023-08-10 RX ADMIN — PROPOFOL 140 MG: 10 INJECTION, EMULSION INTRAVENOUS at 09:08

## 2023-08-10 RX ADMIN — PHENYLEPHRINE HYDROCHLORIDE 150 MCG: 10 INJECTION INTRAVENOUS at 10:08

## 2023-08-10 RX ADMIN — EPHEDRINE SULFATE 10 MG: 50 INJECTION INTRAVENOUS at 10:08

## 2023-08-10 RX ADMIN — SUGAMMADEX 200 MG: 100 INJECTION, SOLUTION INTRAVENOUS at 12:08

## 2023-08-10 RX ADMIN — PHENYLEPHRINE HYDROCHLORIDE 200 MCG: 10 INJECTION INTRAVENOUS at 09:08

## 2023-08-10 RX ADMIN — PREGABALIN 50 MG: 25 CAPSULE ORAL at 09:08

## 2023-08-10 RX ADMIN — HYDRALAZINE HYDROCHLORIDE 50 MG: 50 TABLET ORAL at 09:08

## 2023-08-10 RX ADMIN — PROCHLORPERAZINE EDISYLATE 5 MG: 5 INJECTION INTRAMUSCULAR; INTRAVENOUS at 06:08

## 2023-08-10 RX ADMIN — EPHEDRINE SULFATE 20 MG: 50 INJECTION INTRAVENOUS at 10:08

## 2023-08-10 RX ADMIN — DEXAMETHASONE SODIUM PHOSPHATE 4 MG: 4 INJECTION, SOLUTION INTRA-ARTICULAR; INTRALESIONAL; INTRAMUSCULAR; INTRAVENOUS; SOFT TISSUE at 09:08

## 2023-08-10 NOTE — H&P
[facility] ([facility])  Obstetrics & Gynecology  History & Physical    Patient Name: Nadia Damon  MRN: [id number]  Admission Date: 8/10/2023  Primary Care Provider: Elis Wick MD    Subjective:     Chief Complaint/Reason for Admission: vaginal pressure     History of Present Illness:  Having vaginal pressure and loss of urine with cough   Past history of total vaginal hysterectomy and at least 2 anterior vaginal wall procedures   Now with cuff prolapse       No new subjective & objective note has been filed under this hospital service since the last note was generated.    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

## 2023-08-10 NOTE — INTERVAL H&P NOTE
The patient has been examined and the H&P has been reviewed:    I concur with the findings and changes have been noted since the H&P was written:    Past Medical History:   Diagnosis Date    Abdominal wall hernia     CT Renal 6/11/2018---Small fat containing superior ventral abdominal wall hernia at the epicardial pacing lead site.    Abnormal mammogram 10/12/2021    GRACE (acute kidney injury) 11/22/2021    Anxiety     Arthritis     ZEN HIPS    Breast cancer in female 08/2021    LEFT BREAST    C. difficile colitis 11/29/2021    Cellulitis of axilla, left 12/23/2021    Chronic diastolic heart failure 12/16/2021    Chronic kidney disease     stage 4, GFR 15-29 ml/min    Chronic midline low back pain without sciatica 06/18/2018    Closed nondisplaced fracture of distal phalanx of left great toe with routine healing 10/22/2018    Coronary artery disease 1993    heart transplant    Cystitis 05/10/2022    Depression     Encounter for blood transfusion     Fibromyalgia     on Lyrica    Heart failure     native heart cardiomyopathy    Heart transplanted 1993    due to cardiomyopathy    History of hyperparathyroidism; Hyperparathyroidism, secondary renal     PT DENIES    Hypertension     Immune disorder     anti rejection meds    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    Impaired mobility 07/28/2022    Iron deficiency anemia 08/15/2017    Kidney stones     passed per pt    Obesity     Other osteoporosis without current pathological fracture 08/30/2019    Severe sepsis 11/22/2021    Shingles 2003 approx    left leg    Subclinical hypothyroidism 06/16/2023    Thrombocytopenia, unspecified 11/29/2021    Trouble in sleeping     Urinary incontinence      Past Surgical History:   Procedure Laterality Date    BLADDER SURGERY  2015 approx    mesh - Dr Everett then 2nd reconstructive sx Dr Onofre    BREAST BIOPSY Bilateral     NEGATIVE    BREAST BIOPSY Right 10/31/2022    benign    BREAST LUMPECTOMY Left 2021     BREAST SURGERY Left 09/28/2015    Bx - benign    BREAST SURGERY Right 12/2015    Bx benign    CARDIAC PACEMAKER REMOVAL Left 06/26/2014    Pacer defirillator removed. Put in 1993 aat time of heart transplant    CARPAL TUNNEL RELEASE Left 03/03/2015    Dr. Hall    COLONOSCOPY N/A 02/25/2021    Procedure: COLONOSCOPY;  Surgeon: Freida Ramirez MD;  Location: [facility] ENDO;  Service: Endoscopy;  Laterality: N/A;    CYSTOCELE REPAIR      Twice with mesh removal    EPIDURAL STEROID INJECTION INTO CERVICAL SPINE N/A 02/02/2023    Procedure: T11/T12 IL HELLEN;  Surgeon: Jassi Pierre MD;  Location: [facility] PAIN MGT;  Service: Pain Management;  Laterality: N/A;    HEART TRANSPLANT  1993    HERNIA REPAIR Right 1971 approx    Inguinal    HYSTERECTOMY  1983    vag hyst /LSO     INCISION AND DRAINAGE OF ABSCESS Left 12/24/2021    Procedure: INCISION AND DRAINAGE, ABSCESS;  Surgeon: Joseph Longo MD;  Location: [facility] OR;  Service: General;  Laterality: Left;    INJECTION OF ANESTHETIC AGENT AROUND MEDIAL BRANCH NERVES INNERVATING LUMBAR FACET JOINT Right 10/19/2022    Procedure: Right L4/L5 and L5/S1 MBB;  Surgeon: Jassi Pierre MD;  Location: [facility] PAIN MGT;  Service: Pain Management;  Laterality: Right;    INJECTION OF ANESTHETIC AGENT AROUND MEDIAL BRANCH NERVES INNERVATING LUMBAR FACET JOINT Right 11/09/2022    Procedure: Right L4/L5 and L5/S1 MBB;  Surgeon: Jassi Pierre MD;  Location: [facility] PAIN [facility]GT;  Service: Pain Management;  Laterality: Right;    INJECTION OF ANESTHETIC AGENT INTO SACROILIAC JOINT Right 08/22/2022    Procedure: Right SIJ Injection Right L5/S1 Facte Injection;  Surgeon: Jassi Pierre MD;  Location: [facility] PAIN [facility]GT;  Service: Pain Management;  Laterality: Right;    INSERTION OF TUNNELED CENTRAL VENOUS CATHETER (CVC) WITH SUBCUTANEOUS PORT N/A 11/09/2021    Procedure: [id number]-U-CATH;  Surgeon: Christoph Douglas MD;  Location: [facility] OR;  Service: General;  Laterality: N/A;    RADIOFREQUENCY  THERMOCOAGULATION Right 12/07/2022    Procedure: Right L4/L5 and L5/S1 Lumbar RFA;  Surgeon: Jassi Pierre MD;  Location: [facility] PAIN MGT;  Service: Pain Management;  Laterality: Right;    REMOVAL OF VASCULAR ACCESS PORT      SENTINEL LYMPH NODE BIOPSY Left 10/12/2021    Procedure: BIOPSY, LYMPH NODE, SENTINEL;  Surgeon: Christoph Douglas MD;  Location: [facility] OR;  Service: General;  Laterality: Left;    TOE SURGERY       Family History   Problem Relation Age of Onset    Cancer Mother 38        breast    Breast cancer Mother     Breast cancer Maternal Grandmother     Heart disease Maternal Grandmother     Hypertension Son     Cataracts Cousin     Diabetes Neg Hx     Stroke Neg Hx     Kidney disease Neg Hx     Asthma Neg Hx     COPD Neg Hx     Melanoma Neg Hx     Hyperlipidemia Neg Hx      Social History     Tobacco Use    Smoking status: Never    Smokeless tobacco: Never   Substance Use Topics    Alcohol use: Never     Alcohol/week: 0.0 standard drinks of alcohol    Drug use: No     Facility-Administered Medications Prior to Admission   Medication    acetaminophen tablet 1,000 mg    famotidine tablet 20 mg     PTA Medications   Medication Sig    albuterol (VENTOLIN HFA) 90 mcg/actuation inhaler Inhale 2 puffs into the lungs every 4 (four) hours as needed for Wheezing or Shortness of Breath (cough). Rescue (Patient not taking: Reported on 8/7/2023)    biotin 10,000 mcg Cap Take 1 tablet by mouth once daily.    busPIRone (BUSPAR) 10 MG tablet TAKE 1 TABLET EVERY DAY (Patient not taking: Reported on 8/7/2023)    carvediloL (COREG) 6.25 MG tablet Take 1 tablet (6.25 mg total) by mouth 2 (two) times daily with meals.    EVENING PRIMROSE OIL ORAL Take 1,000 mg by mouth once daily.    furosemide (LASIX) 20 MG tablet Take 1 tablet (20 mg total) by mouth once daily. (Patient not taking: Reported on 8/7/2023)    hydrALAZINE (APRESOLINE) 50 MG tablet Take 1 tablet (50 mg total) by mouth every 8 (eight) hours. TAKE 1 TABLETS  EVERY  8  HOURS.    multivitamin capsule Take 1 capsule by mouth once daily.    NIFEdipine (PROCARDIA-XL) 30 MG (OSM) 24 hr tablet Take 1 tablet (30 mg total) by mouth once daily. (Patient not taking: Reported on 8/7/2023)    pantoprazole (PROTONIX) 40 MG tablet TAKE 1 TABLET EVERY DAY    pregabalin (LYRICA) 50 MG capsule Take 1 capsule (50 mg total) by mouth 2 (two) times daily.    temazepam (RESTORIL) 30 mg capsule TAKE 1 CAPSULE AT BEDTIME (Patient not taking: Reported on 8/7/2023)    vitamin E 400 UNIT capsule Take 400 Units by mouth once daily.    zolpidem (AMBIEN) 10 mg Tab TAKE 0.5 to 1 TABLET at bedtime as needed for insomnia (Patient not taking: Reported on 8/7/2023)    aspirin (ECOTRIN) 81 MG EC tablet Take 1 tablet (81 mg total) by mouth once daily. (Patient not taking: Reported on 8/7/2023)    calcitonin, salmon, (FORTICAL) 200 unit/actuation nasal spray 1 spray by Nasal route once daily.    cycloSPORINE modified, NEORAL, (NEORAL) 25 MG capsule Take 3 capsules (75 mg total) by mouth 2 (two) times daily.    fluticasone propionate (FLONASE) 50 mcg/actuation nasal spray One spray each nares daily x 4 weeks then as needed (Patient not taking: Reported on 8/7/2023)    guaiFENesin (MUCINEX) 600 mg 12 hr tablet Take 1,200 mg by mouth as needed.    LIDOcaine (LIDODERM) 5 % PLACE 2 PATCHES ONTO THE SKIN ONCE DAILY. REMOVE AND DISCARD PATCHES WITHIN 12 HOURS OR AS DIRECTED BY MD (Patient not taking: Reported on 8/7/2023)    polyethylene glycol (GLYCOLAX) 17 gram/dose powder Take 17 g by mouth once daily.    psyllium husk (METAMUCIL ORAL) Take by mouth.    UNABLE TO FIND medication name: Stool softener (Ducolax) Laxative     Review of patient's allergies indicates:   Allergen Reactions    Lisinopril Swelling and Rash    Augmentin [amoxicillin-pot clavulanate] Diarrhea     Current Facility-Administered Medications   Medication    cefazolin (ANCEF) 2 gram in dextrose 5% 50 mL IVPB (premix)    dextrose 5 % and 0.9 % NaCl  infusion      Surgery risks, benefits and alternative options discussed and understood by patient/family.          There are no hospital problems to display for this patient.

## 2023-08-10 NOTE — HPI
Having vaginal pressure and loss of urine with cough   Past history of total vaginal hysterectomy and at least 2 anterior vaginal wall procedures   Now with cuff prolapse

## 2023-08-10 NOTE — TRANSFER OF CARE
"Anesthesia Transfer of Care Note    Patient: Nadia Damon    Procedure(s) Performed: Procedure(s) (LRB):  ROBOTIC SACROCOLPOPEXY, ABDOMEN (N/A)  XI ROBOTIC URETHROPEXY (N/A)  ROBOTIC OOPHORECTOMY (Right)    Patient location: PACU    Anesthesia Type: general    Transport from OR: Transported from OR on room air with adequate spontaneous ventilation    Post pain: adequate analgesia    Post assessment: no apparent anesthetic complications    Post vital signs: stable    Level of consciousness: awake and alert    Nausea/Vomiting: no nausea/vomiting    Complications: none    Transfer of care protocol was followed      Last vitals:   Visit Vitals  BP (!) 154/80 (BP Location: Right arm, Patient Position: Sitting)   Pulse 99   Temp 36.7 °C (98.1 °F) (Temporal)   Resp 20   Ht 5' 2" (1.575 m)   Wt 72.6 kg (160 lb 0.9 oz)   LMP 06/20/1983 (Within Years)   SpO2 97%   Breastfeeding No   BMI 29.27 kg/m²     "

## 2023-08-10 NOTE — ANESTHESIA PROCEDURE NOTES
Intubation    Date/Time: 8/10/2023 9:43 AM    Performed by: Jose Ramon Pierson CRNA  Authorized by: David Peterson MD    Intubation:     Induction:  Intravenous    Intubated:  Postinduction    Mask Ventilation:  Easy mask    Attempts:  1    Attempted By:  Student    Method of Intubation:  Direct    Blade:  Smith 2    Laryngeal View Grade: Grade I - full view of cords      Difficult Airway Encountered?: No      Airway Device:  Oral endotracheal tube    Airway Device Size:  7.0    Style/Cuff Inflation:  Cuffed (inflated to minimal occlusive pressure)    Tube secured:  20    Secured at:  The gums    Placement Verified By:  Capnometry    Complicating Factors:  None    Findings Post-Intubation:  BS equal bilateral and atraumatic/condition of teeth unchanged

## 2023-08-10 NOTE — ANESTHESIA PREPROCEDURE EVALUATION
08/10/2023  Nadia Damon is a 68 y.o., female s/p OHTx (5/27/93) at [facility], s/p PPM same date, mild anemia and leukopenia who has done very well from a cardiac standpoint,. Her most recent Echo done last June showed preserved graft function with an EF=60%. She is scheduled for a robotic Sacrocolpopexy.   Recently cleared by Cards (8/7/23):  May proceed form a cardiac standpoint with no further cardiac testing. I do recommend not interrupting her immuno regimen and avoid large amounts of  IVF.    *Recently taken off of Lasix; reports recent Bilat LE swelling to ankles (1+ pitting edema on exam ) - will minimize IVF during case and manage accordingly     Patient Active Problem List   Diagnosis    Heart transplanted    CKD (chronic kidney disease) stage 4, GFR 15-29 ml/min    Gastroesophageal reflux disease without esophagitis    Essential hypertension    Aortic atherosclerosis    Secondary hyperparathyroidism, renal    Pharyngitis    Obesity (BMI 30.0-34.9)    Other osteoporosis without current pathological fracture    Immunocompromised patient    Ductal carcinoma in situ (DCIS) of left breast    Ulnar neuropathy of left upper extremity    Chronic diastolic (congestive) heart failure    Anemia associated with stage 4 chronic renal failure    Secondary and unspecified malignant neoplasm of axilla and upper limb lymph nodes    Abnormal thyroid function test    Other hyperlipidemia    DDD (degenerative disc disease), thoracolumbar    Ventral hernia without obstruction or gangrene    Acquired hypothyroidism    FRANKY (stress urinary incontinence, female)    Prolapse of vaginal cuff after hysterectomy     Past Medical History:   Diagnosis Date    Abdominal wall hernia     CT Renal 6/11/2018---Small fat containing superior ventral abdominal wall hernia at the epicardial pacing lead  site.    Abnormal mammogram 10/12/2021    GRACE (acute kidney injury) 11/22/2021    Anxiety     Arthritis     ZEN HIPS    Breast cancer in female 08/2021    LEFT BREAST    C. difficile colitis 11/29/2021    Cellulitis of axilla, left 12/23/2021    Chronic diastolic heart failure 12/16/2021    Chronic kidney disease     stage 4, GFR 15-29 ml/min    Chronic midline low back pain without sciatica 06/18/2018    Closed nondisplaced fracture of distal phalanx of left great toe with routine healing 10/22/2018    Coronary artery disease 1993    heart transplant    Cystitis 05/10/2022    Depression     Encounter for blood transfusion     Fibromyalgia     on Lyrica    Heart failure     native heart cardiomyopathy    Heart transplanted 1993    due to cardiomyopathy    History of hyperparathyroidism; Hyperparathyroidism, secondary renal     PT DENIES    Hypertension     Immune disorder     anti rejection meds    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    Impaired mobility 07/28/2022    Iron deficiency anemia 08/15/2017    Kidney stones     passed per pt    Obesity     Other osteoporosis without current pathological fracture 08/30/2019    Severe sepsis 11/22/2021    Shingles 2003 approx    left leg    Subclinical hypothyroidism 06/16/2023    Thrombocytopenia, unspecified 11/29/2021    Trouble in sleeping     Urinary incontinence      Past Surgical History:   Procedure Laterality Date    BLADDER SURGERY  2015 approx    mesh - Dr Everett then 2nd reconstructive sx Dr Onofre    BREAST BIOPSY Bilateral     NEGATIVE    BREAST BIOPSY Right 10/31/2022    benign    BREAST LUMPECTOMY Left 2021    BREAST SURGERY Left 09/28/2015    Bx - benign    BREAST SURGERY Right 12/2015    Bx benign    CARDIAC PACEMAKER REMOVAL Left 06/26/2014    Pacer defirillator removed. Put in 1993 aat time of heart transplant    CARPAL TUNNEL RELEASE Left 03/03/2015    Dr. Hall    COLONOSCOPY N/A  02/25/2021    Procedure: COLONOSCOPY;  Surgeon: Freida Ramirez MD;  Location: [facility] ENDO;  Service: Endoscopy;  Laterality: N/A;    CYSTOCELE REPAIR      Twice with mesh removal    EPIDURAL STEROID INJECTION INTO CERVICAL SPINE N/A 02/02/2023    Procedure: T11/T12 IL HELLEN;  Surgeon: Jassi Pierre MD;  Location: [facility] PAIN MGT;  Service: Pain Management;  Laterality: N/A;    HEART TRANSPLANT  1993    HERNIA REPAIR Right 1971 approx    Inguinal    HYSTERECTOMY  1983    vag hyst /LSO     INCISION AND DRAINAGE OF ABSCESS Left 12/24/2021    Procedure: INCISION AND DRAINAGE, ABSCESS;  Surgeon: Joseph Longo MD;  Location: [facility] OR;  Service: General;  Laterality: Left;    INJECTION OF ANESTHETIC AGENT AROUND MEDIAL BRANCH NERVES INNERVATING LUMBAR FACET JOINT Right 10/19/2022    Procedure: Right L4/L5 and L5/S1 MBB;  Surgeon: Jassi Pierre MD;  Location: [facility] PAIN MGT;  Service: Pain Management;  Laterality: Right;    INJECTION OF ANESTHETIC AGENT AROUND MEDIAL BRANCH NERVES INNERVATING LUMBAR FACET JOINT Right 11/09/2022    Procedure: Right L4/L5 and L5/S1 MBB;  Surgeon: Jassi Pierre MD;  Location: [facility] PAIN MGT;  Service: Pain Management;  Laterality: Right;    INJECTION OF ANESTHETIC AGENT INTO SACROILIAC JOINT Right 08/22/2022    Procedure: Right SIJ Injection Right L5/S1 Facte Injection;  Surgeon: Jassi Pierre MD;  Location: [facility] PAIN MGT;  Service: Pain Management;  Laterality: Right;    INSERTION OF TUNNELED CENTRAL VENOUS CATHETER (CVC) WITH SUBCUTANEOUS PORT N/A 11/09/2021    Procedure: [id number]-[id number];  Surgeon: Christoph Douglas MD;  Location: [facility] OR;  Service: General;  Laterality: N/A;    RADIOFREQUENCY THERMOCOAGULATION Right 12/07/2022    Procedure: Right L4/L5 and L5/S1 Lumbar RFA;  Surgeon: Jassi Pierre MD;  Location: [facility] PAIN MGT;  Service: Pain Management;  Laterality: Right;    REMOVAL OF VASCULAR ACCESS PORT      SENTINEL LYMPH NODE BIOPSY Left 10/12/2021     Procedure: BIOPSY, LYMPH NODE, SENTINEL;  Surgeon: Christoph Douglas MD;  Location: [facility];  Service: General;  Laterality: Left;    TOE SURGERY         Pre-op Assessment    I have reviewed the Patient Summary Reports.    I have reviewed the NPO Status.   I have reviewed the Medications.     Review of Systems  Anesthesia Hx:  No problems with previous Anesthesia  History of prior surgery of interest to airway management or planning: Previous anesthesia: General  Denies Personal Hx of Anesthesia complications.   Social:  Non-Smoker, No Alcohol Use    Hematology/Oncology:     Oncology Normal    -- Anemia: Hematology Comments: 9.2/29.5    Cardiovascular:   Hypertension CAD   CHF ECG has been reviewed. Normal sinus rhythm   Possible Left atrial enlargement   Left axis deviation   Right bundle branch block   Minimal voltage criteria for LVH, may be normal variant ( R in aVL )   T wave abnormality, consider lateral ischemia   Abnormal ECG   When compared with ECG of 06-APR-2022 11:52,   No significant change was found   Confirmed by NUNO STALLWORTH MD ([id number]) on 8/3/2023 7:48:52 AM    Pulmonary:  Pulmonary Normal    Renal/:   Chronic Renal Disease, CKD, ARF CKD-4    BUN 38  Cre 2.6   Hepatic/GI:   GERD    Musculoskeletal:   Arthritis   Spine Disorders: Degenerative disease, Disc disease and Chronic Pain    Neurological:   Neuromuscular Disease, Fibromyalgia   Endocrine:   Hypothyroidism BMI 30 Obesity / BMI > 30  Psych:   depression          Chemistry        Component Value Date/Time     08/02/2023 1239    K 4.9 08/02/2023 1239     08/02/2023 1239    CO2 21 (L) 08/02/2023 1239    BUN 38 (H) 08/02/2023 1239    CREATININE 2.6 (H) 08/02/2023 1239    GLU 94 08/02/2023 1239        Component Value Date/Time    CALCIUM 8.2 (L) 08/02/2023 1239    ALKPHOS 117 08/02/2023 1239    AST 34 08/02/2023 1239    ALT 20 08/02/2023 1239    BILITOT 0.7 08/02/2023 1239    ESTGFRAFRICA 19 (A) 07/14/2022 1100    EGFRNONAA 17 (A)  07/14/2022 1100        Lab Results   Component Value Date    WBC 3.90 08/02/2023    HGB 9.2 (L) 08/02/2023    HCT 29.5 (L) 08/02/2023    MCV 91 08/02/2023     08/02/2023           Physical Exam  General: Well nourished, Cooperative, Alert and Oriented    Airway:  Mallampati: II   Mouth Opening: Normal  TM Distance: Normal  Tongue: Normal  Neck ROM: Normal ROM    Dental:  Partial Dentures, Dentures  Upper and lower partials (will remove); denies any loose teeth  Musculoskeletal:1+ pitting edema in B/L LEs      Anesthesia Plan  Type of Anesthesia, risks & benefits discussed:    Anesthesia Type: Gen ETT  Intra-op Monitoring Plan: Standard ASA Monitors  Post Op Pain Control Plan: multimodal analgesia and IV/PO Opioids PRN  Induction:  IV  Airway Plan: Direct, Post-Induction  Informed Consent: Informed consent signed with the Patient and all parties understand the risks and agree with anesthesia plan.  All questions answered.   ASA Score: 3  Day of Surgery Review of History & Physical: H&P Update referred to the surgeon/provider.  Anesthesia Plan Notes: Intubation:     Induction:  Intravenous    Intubated:  Postinduction    Mask Ventilation:  N/a    Attempts:  1    Attempted By:  CRNA and student    Method of Intubation:  Direct    Blade:  Smith 2    Laryngeal View Grade: Grade I - full view of cords      Difficult Airway Encountered?: No      Complications:  None    Airway Device Size:  7.0    Style/Cuff Inflation:  Cuffed (inflated to minimal occlusive pressure)    Inflation Amount (mL):  7    Tube secured:  22    Secured at:  The lips    Placement Verified By:  Capnometry    Complicating Factors:  None    Findings Post-Intubation:  BS equal bilateral and atraumatic/condition of teeth unchanged    Ready For Surgery From Anesthesia Perspective.     .

## 2023-08-10 NOTE — OP NOTE
'[place] - Surgery ([facility])  Surgery Department  Operative Note    SUMMARY     Date of Procedure: 8/10/2023     Procedure: Procedure(s) (LRB):  ROBOTIC SACROCOLPOPEXY, ABDOMEN (N/A)  XI ROBOTIC URETHROPEXY (N/A)  ROBOTIC OOPHORECTOMY (Right)     Surgeon(s) and Role:     * PRANAY Villalobos MD - Primary    Assisting Surgeon: Silva DRAPER assistance deemed necessary by MD     Pre-Operative Diagnosis: FRANKY (stress urinary incontinence, female) [N39.3]  Prolapse of vaginal cuff after hysterectomy [N99.3]    Post-Operative Diagnosis: Post-Op Diagnosis Codes:     * FRANKY (stress urinary incontinence, female) [N39.3]     * Prolapse of vaginal cuff after hysterectomy [N99.3]    Anesthesia: General    Operative Findings (including complications, if any):        Upper abdomen normal anatomy with no adhesions      Ureters in normal anatomical position       Right ovary normal size and shape with tube      Left ovary small        Small bowel with loose adhesion to the left pelvis.        Appendix is normal       Description of Technical Procedures:      SUMMARY:  The patient was taken to the surgical suite and general            intubation anesthesia induced, placed in supine lithotomy position in [facility].    The vagina was prepped with Betadine.      Swain catheter placed and a  Blunt metal              manipulator placed in the vagina.                                                                  .  The abdomen was prepped with  chlorhexidine solution, and sterile drapes were then applied.              Timeout was taken with the entire operating room team.                                                       A Veress needle was placed in the umbilicus with elevation of the abdominal wall with towel clamps.    CO2 gas insuflation to 15 mm Hg pressure.      4  8 mm bladeless trochars placed 10 cm apart in a transverse line 5 cm above the umbilicus   A 5th 8 mm trochar placed in the upper left quadrant  above the other trhochars                                                                   .  With the patient in Trendelenburg and left     lateral tilt, the da Bret robot was docked using 4 arm technique.       The procedure began by         identifying the anatomy of the pelvis, sacrum and retroperitoneum.      No significant adhesions were appreciated.       The right tube and ovary were adhesed to the pelvic sidewall and in the way of the surgical dissection .   Therefore the right IP was isolated and cauterized allowing for removal of the right tube and ovary      The    sacral promontory was identified and exposed.  The peritoneum was dissected.  The vagina was            identified.   Dissection of the peritoneum off of the vagina was accomplished.                                                                          Then, using a Y-shaped graft, the graft was sewn to the anterior and         posterior vagina with [place]-Tan suture.  Approximately 12 sutures were        used.       Each needle was removed prior to inserting the next needle into the abdomen                                                                       The tail of the graft was then sutured to the sacrum with 2 stitches.       The graft material was trimmed and excess removed from the abdomen.                                                                         Reperitonealization was done using 2-0 absorbable V lock running suture       .  Good hemostasis was appreciated in the operating field     The urine was noted to be clear.  Both ureters appeared to be      within normal limits anatomically.        At this        point, the bladder was filled with water; 250 mL.    The dome of bladder   was                                                                          identified, and a transverse incision was made 2 cm above the dome of the    bladder into the anterior retroperitoneal spacel.  This allowed for dissection       into the space of Retzius and  Identification of the anatomical landmarks,  Noted scarring in the space from previous surgery mostly on the right     placement of 2 sutures on left side and one suture on the right side  with a braided nylon suture in the     usual fashion for a Viera cystourethropexy.  Good hemostasis was noted  .  Any excess fluid suctioned free. All needles  removed the abdomen and accounted for.        The right tube and ovary placed in a catchment bag and removed through the left upper trochar          All of  the instruments were removed. The da Bret system was undocked.                                                                          All needles used in the abdomen were accounted for.        .   4-0 Vicryl subcuticular and Dermabond was used on all sites.                                                                          The patient was then awakened and taken to Recovery in                                                                            stable condition.     Significant Surgical Tasks Conducted by the Assistant(s), if Applicable: bedside assistance     Estimated Blood Loss (EBL): 50 cc           Implants:   Implant Name Type Inv. Item Serial No.  Lot No. LRB No. Used Action   MESH PELV UPSYLON BLUE Y SHAPE - [id number]  MESH PELV UPSYLON BLUE Y SHAPE  [employer] [id number] N/A 1 Implanted       Specimens:       Right tube and ovary            Condition: Good    Disposition: PACU - hemodynamically stable.    Attestation: I was present and scrubbed for the entire procedure.

## 2023-08-11 VITALS
RESPIRATION RATE: 17 BRPM | TEMPERATURE: 99 F | SYSTOLIC BLOOD PRESSURE: 132 MMHG | WEIGHT: 160.06 LBS | OXYGEN SATURATION: 97 % | DIASTOLIC BLOOD PRESSURE: 78 MMHG | HEART RATE: 95 BPM | BODY MASS INDEX: 29.45 KG/M2 | HEIGHT: 62 IN

## 2023-08-11 PROCEDURE — 94799 UNLISTED PULMONARY SVC/PX: CPT | Mod: HCNC

## 2023-08-11 PROCEDURE — 25000003 PHARM REV CODE 250: Mod: HCNC | Performed by: OBSTETRICS & GYNECOLOGY

## 2023-08-11 PROCEDURE — 99900035 HC TECH TIME PER 15 MIN (STAT): Mod: HCNC

## 2023-08-11 RX ORDER — CYCLOSPORINE 25 MG/1
25 CAPSULE, GELATIN COATED ORAL EVERY MORNING
Status: DISCONTINUED | OUTPATIENT
Start: 2023-08-11 | End: 2023-08-11 | Stop reason: DRUGHIGH

## 2023-08-11 RX ORDER — SIMETHICONE 80 MG
1 TABLET,CHEWABLE ORAL 3 TIMES DAILY PRN
Status: DISCONTINUED | OUTPATIENT
Start: 2023-08-11 | End: 2023-08-11 | Stop reason: HOSPADM

## 2023-08-11 RX ORDER — CYCLOSPORINE 25 MG/1
75 CAPSULE, LIQUID FILLED ORAL 2 TIMES DAILY
Status: DISCONTINUED | OUTPATIENT
Start: 2023-08-11 | End: 2023-08-11 | Stop reason: HOSPADM

## 2023-08-11 RX ORDER — HYDROCODONE BITARTRATE AND ACETAMINOPHEN 5; 325 MG/1; MG/1
1 TABLET ORAL EVERY 4 HOURS PRN
Qty: 8 TABLET | Refills: 0 | Status: SHIPPED | OUTPATIENT
Start: 2023-08-11 | End: 2023-09-19

## 2023-08-11 RX ADMIN — FUROSEMIDE 20 MG: 20 TABLET ORAL at 08:08

## 2023-08-11 RX ADMIN — PREGABALIN 50 MG: 25 CAPSULE ORAL at 08:08

## 2023-08-11 RX ADMIN — CARVEDILOL 6.25 MG: 6.25 TABLET, FILM COATED ORAL at 08:08

## 2023-08-11 RX ADMIN — PANTOPRAZOLE SODIUM 40 MG: 40 TABLET, DELAYED RELEASE ORAL at 08:08

## 2023-08-11 RX ADMIN — HYDRALAZINE HYDROCHLORIDE 50 MG: 50 TABLET ORAL at 05:08

## 2023-08-11 RX ADMIN — HYDROCODONE BITARTRATE AND ACETAMINOPHEN 1 TABLET: 5; 325 TABLET ORAL at 05:08

## 2023-08-11 RX ADMIN — HYDROCODONE BITARTRATE AND ACETAMINOPHEN 1 TABLET: 5; 325 TABLET ORAL at 10:08

## 2023-08-11 RX ADMIN — SIMETHICONE 80 MG: 80 TABLET, CHEWABLE ORAL at 10:08

## 2023-08-11 NOTE — PLAN OF CARE
Pt to be discharged home, prescribed medicines at bedside, her friend will transport her, feels comfortable going home.  Problem: Infection  Goal: Absence of Infection Signs and Symptoms  Outcome: Met     Problem: Adult Inpatient Plan of Care  Goal: Plan of Care Review  Outcome: Met  Goal: Patient-Specific Goal (Individualized)  Outcome: Met  Goal: Absence of Hospital-Acquired Illness or Injury  Outcome: Met  Goal: Optimal Comfort and Wellbeing  Outcome: Met  Goal: Readiness for Transition of Care  Outcome: Met     Problem: Fall Injury Risk  Goal: Absence of Fall and Fall-Related Injury  Outcome: Met     Problem: Skin Injury Risk Increased  Goal: Skin Health and Integrity  Outcome: Met

## 2023-08-11 NOTE — PLAN OF CARE
Problem: Infection  Goal: Absence of Infection Signs and Symptoms  Outcome: Ongoing, Progressing     Problem: Adult Inpatient Plan of Care  Goal: Plan of Care Review  Outcome: Ongoing, Progressing  Flowsheets (Taken 8/11/2023 0443)  Plan of Care Reviewed With: patient     Problem: Fall Injury Risk  Goal: Absence of Fall and Fall-Related Injury  Outcome: Ongoing, Progressing     Problem: Skin Injury Risk Increased  Goal: Skin Health and Integrity  Outcome: Ongoing, Progressing     POC reviewed with pt. verbalized understanding. Pain control with PRN meds. Swain remains in place. I&Os recorded. Surgical incision sites remain clean, dry, and intact, and free of signs of infection. All safety measures maintained. Will continue to monitor.

## 2023-08-11 NOTE — PROGRESS NOTES
'[place] - Telemetry ([facility])  Obstetrics & Gynecology  Progress Note    Patient Name: Nadia Damon  MRN: [id number]  Admission Date: 8/10/2023  Primary Care Provider: Elis Wick MD  Principal Problem: Prolapse of vaginal cuff after hysterectomy    Subjective:     HPI:  Having vaginal pressure and loss of urine with cough   Past history of total vaginal hysterectomy and at least 2 anterior vaginal wall procedures   Now with cuff prolapse       Interval History: Patient reports no problems overnight, denies vaginal bleeding, no prob void; tolerating clears diet--eating regular diet for breakfast, +ambulation     Scheduled Meds:   carvediloL  6.25 mg Oral BID WM    furosemide  20 mg Oral Daily    hydrALAZINE  50 mg Oral Q8H    pantoprazole  40 mg Oral Daily    pregabalin  50 mg Oral BID    zolpidem  5 mg Oral QHS     Continuous Infusions:   dextrose 5 % and 0.45 % NaCl 125 mL/hr at 08/10/23 1739     PRN Meds:diphenhydrAMINE, HYDROcodone-acetaminophen, HYDROmorphone, ondansetron, prochlorperazine    Review of patient's allergies indicates:   Allergen Reactions    Lisinopril Swelling and Rash    Augmentin [amoxicillin-pot clavulanate] Diarrhea       Objective:     Vital Signs (Most Recent):  Temp: 98.6 °F (37 °C) (08/11/23 0735)  Pulse: 95 (08/11/23 0735)  Resp: 17 (08/11/23 0735)  BP: 132/78 (08/11/23 0735)  SpO2: 97 % (08/11/23 0735) Vital Signs (24h Range):  Temp:  [97.9 °F (36.6 °C)-99.1 °F (37.3 °C)] 98.6 °F (37 °C)  Pulse:  [78-99] 95  Resp:  [13-23] 17  SpO2:  [96 %-100 %] 97 %  BP: (111-154)/(62-80) 132/78     Weight: 72.6 kg (160 lb 0.9 oz)  Body mass index is 29.27 kg/m².  Patient's last menstrual period was 06/20/1983 (within years).    I&O (Last 24H):    Intake/Output Summary (Last 24 hours) at 8/11/2023 0843  Last data filed at 8/11/2023 0741  Gross per 24 hour   Intake 950 ml   Output 3350 ml   Net -2400 ml         Laboratory:  None    Diagnostic Results:  N/a     Physical Exam:    Constitutional: She is oriented to person, place, and time. She appears well-developed.     Eyes: Pupils are equal, round, and reactive to light. Conjunctivae and EOM are normal.      Pulmonary/Chest: Effort normal.        Abdominal: Soft. Bowel sounds are normal. She exhibits abdominal incision (well healed x 5).             Musculoskeletal: Normal range of motion.       Neurological: She is alert and oriented to person, place, and time.    Skin: Skin is warm.    Psychiatric: She has a normal mood and affect.        Review of Systems   All other systems reviewed and are negative.        Assessment/Plan:     * Prolapse of vaginal cuff after hysterectomy  Robotic sacral colpopexy   08/11/2023  Afebrile  Tolerating diet  Pain controlled  Stable for discharge    FRANKY (stress urinary incontinence, female)  Robotic Viera procedure   08/11/2023  Swain removed  +void        Aliza Galicia MD  Obstetrics & Gynecology  [facility] - Telemetry ([facility])

## 2023-08-11 NOTE — DISCHARGE SUMMARY
[facility] ([facility])  Obstetrics & Gynecology  Discharge Summary    Patient Name: Nadia Damon  MRN: [id number]  Admission Date: 8/10/2023  Hospital Length of Stay: 0 days  Discharge Date and Time:  08/11/2023 8:59 AM  Attending Physician: PRANAY Villalobos MD   Discharging Provider: Aliza Galicia MD  Primary Care Provider: Elis Wick MD    HPI:  Having vaginal pressure and loss of urine with cough   Past history of total vaginal hysterectomy and at least 2 anterior vaginal wall procedures   Now with cuff prolapse       Hospital Course:  8/10/2023--robotic assisted sacrocolpoplexy, castorena, rso  8/11/2023--+void, stable for discharge      Goals of Care Treatment Preferences:  Code Status: Full Code    Living Will: Yes              Procedure(s) (LRB):  ROBOTIC SACROCOLPOPEXY, ABDOMEN (N/A)  XI ROBOTIC URETHROPEXY (N/A)  ROBOTIC OOPHORECTOMY (Right)         Significant Diagnostic Studies: N/A      Pending Diagnostic Studies:     Procedure Component Value Units Date/Time    Specimen to Pathology, Surgery Gynecology and Obstetrics [[id number]] Collected: 08/10/23 1245    Order Status: Sent Lab Status: In process Updated: 08/10/23 1341    Specimen: Tissue         Final Active Diagnoses:    Diagnosis Date Noted POA    PRINCIPAL PROBLEM:  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 Go in 4 week(s).    Specialties: Obstetrics, Obstetrics and Gynecology  Why: (has post op appt scheduled)  Contact information:  [address]  [place] [address] [phone]                       Patient Instructions:      Diet Adult Regular     Other restrictions (specify):   Order Comments: Showers only for 6 weeks     No driving until:  Patient Education     Please return to the Emergency Department immediately if your child develops any new symptoms or worsening of their current symptoms!! If your child has been prescribed a medication and are unable to take this medication for any reason, please return to the Emergency Department for further evaluation! If your child has been referred for follow-up to a specialist, but you are unable to follow-up and you child's symptoms are either not improving or are worsening, please return to the Emergency Department for further evaluation!   Order Comments: No driving while using narcotic rx     Pelvic Rest   Order Comments: For 6 wks--no tampons, intercourse, douching     Notify your health care provider if you experience any of the following:  temperature >100.4     Notify your health care provider if you experience any of the following:  persistent nausea and vomiting or diarrhea     Notify your health care provider if you experience any of the following:  severe uncontrolled pain     Notify your health care provider if you experience any of the following:  difficulty breathing or increased cough     Notify your health care provider if you experience any of the following:  increased confusion or weakness     No dressing needed     Medications:  Reconciled Home Medications:      Medication List      START taking these medications    HYDROcodone-acetaminophen 5-325 mg per tablet  Commonly known as: NORCO  Take 1 tablet by mouth every 4 (four) hours as needed for Pain.        CONTINUE taking these medications    biotin 10,000 mcg Cap  Take 1 tablet by mouth once daily.     calcitonin (salmon) 200 unit/actuation nasal spray  Commonly known as: FORTICAL  1 spray by Nasal route once daily.     carvediloL 6.25 MG tablet  Commonly known as: COREG  Take 1 tablet (6.25 mg total) by mouth 2 (two) times daily with meals.     cycloSPORINE modified (NEORAL) 25 MG capsule  Commonly known as: NEORAL  Take 3 capsules (75 mg total) by mouth 2 (two) times daily.     EVENING PRIMROSE OIL ORAL  Take 1,000 mg by mouth once daily.     furosemide 20 MG tablet  Commonly known as: LASIX  Take 1 tablet (20 mg total) by mouth once daily.     guaiFENesin 600 mg 12 hr tablet  Commonly known as: MUCINEX  Take 1,200 mg by mouth as needed.     hydrALAZINE 50 MG tablet  Commonly known as: APRESOLINE  Take 1 tablet (50 mg total) by mouth every 8 (eight) hours. TAKE 1 TABLETS  EVERY 8  HOURS.     METAMUCIL ORAL  Take by mouth.     multivitamin capsule  Take 1 capsule by mouth once  daily.     pantoprazole 40 MG tablet  Commonly known as: PROTONIX  TAKE 1 TABLET EVERY DAY     polyethylene glycol 17 gram/dose powder  Commonly known as: GLYCOLAX  Take 17 g by mouth once daily.     pregabalin 50 MG capsule  Commonly known as: LYRICA  Take 1 capsule (50 mg total) by mouth 2 (two) times daily.     UNABLE TO FIND  medication name: Stool softener (Ducolax) Laxative     vitamin E 400 UNIT capsule  Take 400 Units by mouth once daily.        STOP taking these medications    albuterol 90 mcg/actuation inhaler  Commonly known as: VENTOLIN HFA     aspirin 81 MG EC tablet  Commonly known as: ECOTRIN     busPIRone 10 MG tablet  Commonly known as: BUSPAR     fluticasone propionate 50 mcg/actuation nasal spray  Commonly known as: FLONASE     LIDOcaine 5 %  Commonly known as: LIDODERM        ASK your doctor about these medications    NIFEdipine 30 MG (OSM) 24 hr tablet  Commonly known as: PROCARDIA-XL  Take 1 tablet (30 mg total) by mouth once daily.     temazepam 30 mg capsule  Commonly known as: RESTORIL  TAKE 1 CAPSULE AT BEDTIME     zolpidem 10 mg Tab  Commonly known as: AMBIEN  TAKE 0.5 to 1 TABLET at bedtime as needed for insomnia            Aliza Galicia MD  Obstetrics & Gynecology  [facility] ([facility])

## 2023-08-11 NOTE — ANESTHESIA POSTPROCEDURE EVALUATION
Anesthesia Post Evaluation    Patient: Nadia Damon    Procedure(s) Performed: Procedure(s) (LRB):  ROBOTIC SACROCOLPOPEXY, ABDOMEN (N/A)  XI ROBOTIC URETHROPEXY (N/A)  ROBOTIC OOPHORECTOMY (Right)    Final Anesthesia Type: general      Patient location during evaluation: PACU  Patient participation: Yes- Able to Participate  Level of consciousness: awake and alert and oriented  Post-procedure vital signs: reviewed and stable  Pain management: adequate  Airway patency: patent  DIALLO mitigation strategies: Verification of full reversal of neuromuscular block  PONV status at discharge: No PONV  Anesthetic complications: no      Cardiovascular status: blood pressure returned to baseline and hemodynamically stable  Respiratory status: unassisted  Hydration status: euvolemic  Follow-up not needed.          Vitals Value Taken Time   /78 08/10/23 1500   Temp 37 °C (98.6 °F) 08/10/23 1500   Pulse 95 08/10/23 1500   Resp 17 08/10/23 1500   SpO2 97 % 08/10/23 1500       Event Time   Out of Recovery 15:29:01         Pain/Marixa Score: Pain Rating Prior to Med Admin: 7 (8/11/2023  5:45 AM)  Pain Rating Post Med Admin: 5 (8/10/2023 10:41 PM)  Marixa Score: 10 (8/10/2023  3:15 PM)

## 2023-08-11 NOTE — SUBJECTIVE & OBJECTIVE
Interval History: Patient reports no problems overnight, denies vaginal bleeding, no prob void; tolerating clears diet--eating regular diet for breakfast, +ambulation     Scheduled Meds:   carvediloL  6.25 mg Oral BID WM    furosemide  20 mg Oral Daily    hydrALAZINE  50 mg Oral Q8H    pantoprazole  40 mg Oral Daily    pregabalin  50 mg Oral BID    zolpidem  5 mg Oral QHS     Continuous Infusions:   dextrose 5 % and 0.45 % NaCl 125 mL/hr at 08/10/23 1739     PRN Meds:diphenhydrAMINE, HYDROcodone-acetaminophen, HYDROmorphone, ondansetron, prochlorperazine    Review of patient's allergies indicates:   Allergen Reactions    Lisinopril Swelling and Rash    Augmentin [amoxicillin-pot clavulanate] Diarrhea       Objective:     Vital Signs (Most Recent):  Temp: 98.6 °F (37 °C) (08/11/23 0735)  Pulse: 95 (08/11/23 0735)  Resp: 17 (08/11/23 0735)  BP: 132/78 (08/11/23 0735)  SpO2: 97 % (08/11/23 0735) Vital Signs (24h Range):  Temp:  [97.9 °F (36.6 °C)-99.1 °F (37.3 °C)] 98.6 °F (37 °C)  Pulse:  [78-99] 95  Resp:  [13-23] 17  SpO2:  [96 %-100 %] 97 %  BP: (111-154)/(62-80) 132/78     Weight: 72.6 kg (160 lb 0.9 oz)  Body mass index is 29.27 kg/m².  Patient's last menstrual period was 06/20/1983 (within years).    I&O (Last 24H):    Intake/Output Summary (Last 24 hours) at 8/11/2023 0843  Last data filed at 8/11/2023 0741  Gross per 24 hour   Intake 950 ml   Output 3350 ml   Net -2400 ml         Laboratory:  None    Diagnostic Results:  N/a     Physical Exam:   Constitutional: She is oriented to person, place, and time. She appears well-developed.     Eyes: Pupils are equal, round, and reactive to light. Conjunctivae and EOM are normal.      Pulmonary/Chest: Effort normal.        Abdominal: Soft. Bowel sounds are normal. She exhibits abdominal incision (well healed x 5).             Musculoskeletal: Normal range of motion.       Neurological: She is alert and oriented to person, place, and time.    Skin: Skin is warm.     Psychiatric: She has a normal mood and affect.        Review of Systems   All other systems reviewed and are negative.

## 2023-08-11 NOTE — ASSESSMENT & PLAN NOTE
Robotic sacral colpopexy   08/11/2023  Afebrile  Tolerating diet  Pain controlled  Stable for discharge

## 2023-08-14 ENCOUNTER — TELEPHONE (OUTPATIENT)
Dept: OBSTETRICS AND GYNECOLOGY | Facility: CLINIC | Age: 68
End: 2023-08-14
Payer: MEDICARE

## 2023-08-14 ENCOUNTER — OFFICE VISIT (OUTPATIENT)
Dept: OBSTETRICS AND GYNECOLOGY | Facility: CLINIC | Age: 68
End: 2023-08-14
Payer: MEDICARE

## 2023-08-14 VITALS
HEIGHT: 62 IN | DIASTOLIC BLOOD PRESSURE: 78 MMHG | SYSTOLIC BLOOD PRESSURE: 142 MMHG | BODY MASS INDEX: 29.44 KG/M2 | WEIGHT: 160 LBS

## 2023-08-14 DIAGNOSIS — B37.31 CANDIDIASIS OF VULVA AND VAGINA: Primary | ICD-10-CM

## 2023-08-14 DIAGNOSIS — R30.0 DYSURIA: Primary | ICD-10-CM

## 2023-08-14 LAB
FINAL PATHOLOGIC DIAGNOSIS: NORMAL
GROSS: NORMAL
Lab: NORMAL

## 2023-08-14 PROCEDURE — 1160F RVW MEDS BY RX/DR IN RCRD: CPT | Mod: HCNC,CPTII,S$GLB, | Performed by: PHYSICIAN ASSISTANT

## 2023-08-14 PROCEDURE — 3077F SYST BP >= 140 MM HG: CPT | Mod: HCNC,CPTII,S$GLB, | Performed by: PHYSICIAN ASSISTANT

## 2023-08-14 PROCEDURE — 1159F PR MEDICATION LIST DOCUMENTED IN MEDICAL RECORD: ICD-10-PCS | Mod: HCNC,CPTII,S$GLB, | Performed by: PHYSICIAN ASSISTANT

## 2023-08-14 PROCEDURE — 3008F BODY MASS INDEX DOCD: CPT | Mod: HCNC,CPTII,S$GLB, | Performed by: PHYSICIAN ASSISTANT

## 2023-08-14 PROCEDURE — 3066F PR DOCUMENTATION OF TREATMENT FOR NEPHROPATHY: ICD-10-PCS | Mod: HCNC,CPTII,S$GLB, | Performed by: PHYSICIAN ASSISTANT

## 2023-08-14 PROCEDURE — 3288F FALL RISK ASSESSMENT DOCD: CPT | Mod: HCNC,CPTII,S$GLB, | Performed by: PHYSICIAN ASSISTANT

## 2023-08-14 PROCEDURE — 3066F NEPHROPATHY DOC TX: CPT | Mod: HCNC,CPTII,S$GLB, | Performed by: PHYSICIAN ASSISTANT

## 2023-08-14 PROCEDURE — 3077F PR MOST RECENT SYSTOLIC BLOOD PRESSURE >= 140 MM HG: ICD-10-PCS | Mod: HCNC,CPTII,S$GLB, | Performed by: PHYSICIAN ASSISTANT

## 2023-08-14 PROCEDURE — 3044F HG A1C LEVEL LT 7.0%: CPT | Mod: HCNC,CPTII,S$GLB, | Performed by: PHYSICIAN ASSISTANT

## 2023-08-14 PROCEDURE — 99024 POSTOP FOLLOW-UP VISIT: CPT | Mod: HCNC,S$GLB,, | Performed by: OBSTETRICS & GYNECOLOGY

## 2023-08-14 PROCEDURE — 1101F PT FALLS ASSESS-DOCD LE1/YR: CPT | Mod: HCNC,CPTII,S$GLB, | Performed by: PHYSICIAN ASSISTANT

## 2023-08-14 PROCEDURE — 1159F MED LIST DOCD IN RCRD: CPT | Mod: HCNC,CPTII,S$GLB, | Performed by: PHYSICIAN ASSISTANT

## 2023-08-14 PROCEDURE — 3078F PR MOST RECENT DIASTOLIC BLOOD PRESSURE < 80 MM HG: ICD-10-PCS | Mod: HCNC,CPTII,S$GLB, | Performed by: PHYSICIAN ASSISTANT

## 2023-08-14 PROCEDURE — 99999 PR PBB SHADOW E&M-EST. PATIENT-LVL III: CPT | Mod: PBBFAC,HCNC,,

## 2023-08-14 PROCEDURE — 1101F PR PT FALLS ASSESS DOC 0-1 FALLS W/OUT INJ PAST YR: ICD-10-PCS | Mod: HCNC,CPTII,S$GLB, | Performed by: PHYSICIAN ASSISTANT

## 2023-08-14 PROCEDURE — 3078F DIAST BP <80 MM HG: CPT | Mod: HCNC,CPTII,S$GLB, | Performed by: PHYSICIAN ASSISTANT

## 2023-08-14 PROCEDURE — 3008F PR BODY MASS INDEX (BMI) DOCUMENTED: ICD-10-PCS | Mod: HCNC,CPTII,S$GLB, | Performed by: PHYSICIAN ASSISTANT

## 2023-08-14 PROCEDURE — 1160F PR REVIEW ALL MEDS BY PRESCRIBER/CLIN PHARMACIST DOCUMENTED: ICD-10-PCS | Mod: HCNC,CPTII,S$GLB, | Performed by: PHYSICIAN ASSISTANT

## 2023-08-14 PROCEDURE — 3288F PR FALLS RISK ASSESSMENT DOCUMENTED: ICD-10-PCS | Mod: HCNC,CPTII,S$GLB, | Performed by: PHYSICIAN ASSISTANT

## 2023-08-14 PROCEDURE — 99024 PR POST-OP FOLLOW-UP VISIT: ICD-10-PCS | Mod: HCNC,S$GLB,, | Performed by: OBSTETRICS & GYNECOLOGY

## 2023-08-14 PROCEDURE — 99999 PR PBB SHADOW E&M-EST. PATIENT-LVL III: ICD-10-PCS | Mod: PBBFAC,HCNC,,

## 2023-08-14 PROCEDURE — 1125F AMNT PAIN NOTED PAIN PRSNT: CPT | Mod: HCNC,CPTII,S$GLB, | Performed by: PHYSICIAN ASSISTANT

## 2023-08-14 PROCEDURE — 1125F PR PAIN SEVERITY QUANTIFIED, PAIN PRESENT: ICD-10-PCS | Mod: HCNC,CPTII,S$GLB, | Performed by: PHYSICIAN ASSISTANT

## 2023-08-14 PROCEDURE — 1157F ADVNC CARE PLAN IN RCRD: CPT | Mod: HCNC,CPTII,S$GLB, | Performed by: PHYSICIAN ASSISTANT

## 2023-08-14 PROCEDURE — 3044F PR MOST RECENT HEMOGLOBIN A1C LEVEL <7.0%: ICD-10-PCS | Mod: HCNC,CPTII,S$GLB, | Performed by: PHYSICIAN ASSISTANT

## 2023-08-14 PROCEDURE — 1157F PR ADVANCE CARE PLAN OR EQUIV PRESENT IN MEDICAL RECORD: ICD-10-PCS | Mod: HCNC,CPTII,S$GLB, | Performed by: PHYSICIAN ASSISTANT

## 2023-08-14 RX ORDER — FLUCONAZOLE 100 MG/1
100 TABLET ORAL DAILY
Qty: 3 TABLET | Refills: 0 | Status: SHIPPED | OUTPATIENT
Start: 2023-08-14 | End: 2023-08-17

## 2023-08-14 RX ORDER — CLOTRIMAZOLE AND BETAMETHASONE DIPROPIONATE 10; .64 MG/G; MG/G
CREAM TOPICAL 2 TIMES DAILY
Qty: 15 G | Refills: 0 | Status: SHIPPED | OUTPATIENT
Start: 2023-08-14 | End: 2023-08-21

## 2023-08-14 NOTE — TELEPHONE ENCOUNTER
----- Message from Pia Shin sent at 8/14/2023  1:24 PM CDT -----  Patient is requesting the nurse give her a call back at [phone] Thx jm

## 2023-08-14 NOTE — PROGRESS NOTES
Subjective:      Patient ID: Nadia Damon is a 68 y.o. female.    Chief Complaint:  Post-op Problem      History of Present Illness  HPI  Postoperative Follow-up  Patient presents to the clinic POD4 status post  robotic Viera and sacrocolpopexy  for  post op concerns. Reports having burning and pain when starting to void. Resolves after urination and she does feel complete emptying. Has burning and itching on the outside as well . Eating a regular diet without difficulty. Bowel movements are normal. Pain is controlled with current analgesics.      GYN & OB History  Patient's last menstrual period was 1983 (within years).   Date of Last Pap: No result found    OB History    Para Term  AB Living   4 2 2     2   SAB IAB Ectopic Multiple Live Births                  # Outcome Date GA Lbr Tirso/2nd Weight Sex Delivery Anes PTL Lv   4 Term            3 Term            2       Vag-Spont      1       Vag-Spont          Review of Systems  Review of Systems   Constitutional:  Negative for activity change, chills, fatigue and fever.   Respiratory:  Negative for cough.    Cardiovascular:  Negative for chest pain and leg swelling.   Gastrointestinal:  Negative for abdominal pain, constipation, nausea and vomiting.   Genitourinary:  Negative for dysuria, frequency, hematuria, pelvic pain, urgency, vaginal bleeding and vaginal discharge.   Integumentary:  Positive for rash.          Objective:     Physical Exam:   Constitutional: She is oriented to person, place, and time. She appears well-developed and well-nourished. No distress.             Abdominal: Soft. She exhibits abdominal incision (incisions healign well; suprapubic intertrigo is resolving). She exhibits no distension. There is no abdominal tenderness.     Genitourinary:    Genitourinary Comments: Erythema of labia, no erosions or pustules, scant vaginal discharge             Musculoskeletal: No edema.       Neurological: She is  alert and oriented to person, place, and time.    Skin: Skin is warm and dry. She is not diaphoretic. No erythema.          Problem List Items Addressed This Visit    None  Visit Diagnoses       Candidiasis of vulva and vagina    -  Primary    Relevant Medications    fluconazole (DIFLUCAN) 100 MG tablet    clotrimazole-betamethasone 1-0.05% (LOTRISONE) cream        UA completely negative  Has concurrent intertrigo on abdomen. Suspect vulvovaginitis, start lotrisone and diflucan  Postoperative instructions reviewed.

## 2023-08-14 NOTE — TELEPHONE ENCOUNTER
Procedure on 8/10. States when she uses the bathroom it is hurting sensation until her bladder is completely empty. Pt complained of feeling raw from where her cath was at and would like to come in for a visit. Informed pt that as long as she had not taken any pain medicine and could stomp her foot on the ground without it hurting as if she would need to slam on the breaks then she was ok to drive herself. Pt stated she would and would come in for 3 pm today.

## 2023-08-15 ENCOUNTER — TELEPHONE (OUTPATIENT)
Dept: HEMATOLOGY/ONCOLOGY | Facility: CLINIC | Age: 68
End: 2023-08-15
Payer: MEDICARE

## 2023-08-15 NOTE — TELEPHONE ENCOUNTER
Guilherme received call from pt. Pt. reports she is post-op and is in need of transportation assistance for upcoming appts on 8/18 and 8/21. Guilherme scheduled pt. transportation through [employer]. [place] to assist with pt. return rides home as well. [employer] will remain available.

## 2023-08-18 ENCOUNTER — LAB VISIT (OUTPATIENT)
Dept: LAB | Facility: HOSPITAL | Age: 68
End: 2023-08-18
Payer: MEDICARE

## 2023-08-18 DIAGNOSIS — D69.6 THROMBOCYTOPENIA, UNSPECIFIED: ICD-10-CM

## 2023-08-18 DIAGNOSIS — N18.4 ANEMIA ASSOCIATED WITH STAGE 4 CHRONIC RENAL FAILURE: Chronic | ICD-10-CM

## 2023-08-18 DIAGNOSIS — D50.9 IRON DEFICIENCY ANEMIA, UNSPECIFIED IRON DEFICIENCY ANEMIA TYPE: ICD-10-CM

## 2023-08-18 DIAGNOSIS — D63.1 ANEMIA ASSOCIATED WITH STAGE 4 CHRONIC RENAL FAILURE: Chronic | ICD-10-CM

## 2023-08-18 LAB
ALBUMIN SERPL BCP-MCNC: 3.4 G/DL (ref 3.5–5.2)
ALP SERPL-CCNC: 124 U/L (ref 55–135)
ALT SERPL W/O P-5'-P-CCNC: 14 U/L (ref 10–44)
ANION GAP SERPL CALC-SCNC: 14 MMOL/L (ref 8–16)
AST SERPL-CCNC: 26 U/L (ref 10–40)
BASOPHILS # BLD AUTO: 0.01 K/UL (ref 0–0.2)
BASOPHILS NFR BLD: 0.3 % (ref 0–1.9)
BILIRUB SERPL-MCNC: 0.5 MG/DL (ref 0.1–1)
BUN SERPL-MCNC: 39 MG/DL (ref 8–23)
CALCIUM SERPL-MCNC: 8.7 MG/DL (ref 8.7–10.5)
CHLORIDE SERPL-SCNC: 110 MMOL/L (ref 95–110)
CO2 SERPL-SCNC: 21 MMOL/L (ref 23–29)
CREAT SERPL-MCNC: 2.9 MG/DL (ref 0.5–1.4)
DIFFERENTIAL METHOD: ABNORMAL
EOSINOPHIL # BLD AUTO: 0.1 K/UL (ref 0–0.5)
EOSINOPHIL NFR BLD: 3.8 % (ref 0–8)
ERYTHROCYTE [DISTWIDTH] IN BLOOD BY AUTOMATED COUNT: 15.3 % (ref 11.5–14.5)
EST. GFR  (NO RACE VARIABLE): 17 ML/MIN/1.73 M^2
FERRITIN SERPL-MCNC: 121 NG/ML (ref 20–300)
GLUCOSE SERPL-MCNC: 102 MG/DL (ref 70–110)
HCT VFR BLD AUTO: 29.3 % (ref 37–48.5)
HGB BLD-MCNC: 9.2 G/DL (ref 12–16)
IMM GRANULOCYTES # BLD AUTO: 0.01 K/UL (ref 0–0.04)
IMM GRANULOCYTES NFR BLD AUTO: 0.3 % (ref 0–0.5)
IRON SERPL-MCNC: 80 UG/DL (ref 30–160)
LYMPHOCYTES # BLD AUTO: 0.8 K/UL (ref 1–4.8)
LYMPHOCYTES NFR BLD: 20.4 % (ref 18–48)
MCH RBC QN AUTO: 27.8 PG (ref 27–31)
MCHC RBC AUTO-ENTMCNC: 31.4 G/DL (ref 32–36)
MCV RBC AUTO: 89 FL (ref 82–98)
MONOCYTES # BLD AUTO: 0.5 K/UL (ref 0.3–1)
MONOCYTES NFR BLD: 12.9 % (ref 4–15)
NEUTROPHILS # BLD AUTO: 2.3 K/UL (ref 1.8–7.7)
NEUTROPHILS NFR BLD: 62.3 % (ref 38–73)
NRBC BLD-RTO: 0 /100 WBC
PLATELET # BLD AUTO: 246 K/UL (ref 150–450)
PMV BLD AUTO: 9.4 FL (ref 9.2–12.9)
POTASSIUM SERPL-SCNC: 4.8 MMOL/L (ref 3.5–5.1)
PROT SERPL-MCNC: 8 G/DL (ref 6–8.4)
RBC # BLD AUTO: 3.31 M/UL (ref 4–5.4)
SATURATED IRON: 28 % (ref 20–50)
SODIUM SERPL-SCNC: 145 MMOL/L (ref 136–145)
TOTAL IRON BINDING CAPACITY: 281 UG/DL (ref 250–450)
TRANSFERRIN SERPL-MCNC: 190 MG/DL (ref 200–375)
WBC # BLD AUTO: 3.73 K/UL (ref 3.9–12.7)

## 2023-08-18 PROCEDURE — 82728 ASSAY OF FERRITIN: CPT | Mod: HCNC

## 2023-08-18 PROCEDURE — 36415 COLL VENOUS BLD VENIPUNCTURE: CPT | Mod: HCNC

## 2023-08-18 PROCEDURE — 84466 ASSAY OF TRANSFERRIN: CPT | Mod: HCNC

## 2023-08-18 PROCEDURE — 85025 COMPLETE CBC W/AUTO DIFF WBC: CPT | Mod: HCNC

## 2023-08-18 PROCEDURE — 80053 COMPREHEN METABOLIC PANEL: CPT | Mod: HCNC

## 2023-08-18 PROCEDURE — 83540 ASSAY OF IRON: CPT | Mod: HCNC

## 2023-08-21 ENCOUNTER — INFUSION (OUTPATIENT)
Dept: INFUSION THERAPY | Facility: HOSPITAL | Age: 68
End: 2023-08-21
Attending: INTERNAL MEDICINE
Payer: MEDICARE

## 2023-08-21 ENCOUNTER — OFFICE VISIT (OUTPATIENT)
Dept: HEMATOLOGY/ONCOLOGY | Facility: CLINIC | Age: 68
End: 2023-08-21
Payer: MEDICARE

## 2023-08-21 VITALS
RESPIRATION RATE: 16 BRPM | OXYGEN SATURATION: 98 % | DIASTOLIC BLOOD PRESSURE: 75 MMHG | TEMPERATURE: 98 F | HEART RATE: 91 BPM | SYSTOLIC BLOOD PRESSURE: 127 MMHG

## 2023-08-21 VITALS
WEIGHT: 164.69 LBS | TEMPERATURE: 98 F | DIASTOLIC BLOOD PRESSURE: 75 MMHG | HEART RATE: 91 BPM | HEIGHT: 62 IN | SYSTOLIC BLOOD PRESSURE: 122 MMHG | OXYGEN SATURATION: 98 % | BODY MASS INDEX: 30.31 KG/M2

## 2023-08-21 DIAGNOSIS — D63.1 ANEMIA ASSOCIATED WITH STAGE 4 CHRONIC RENAL FAILURE: Primary | ICD-10-CM

## 2023-08-21 DIAGNOSIS — D63.1 ANEMIA ASSOCIATED WITH STAGE 4 CHRONIC RENAL FAILURE: Chronic | ICD-10-CM

## 2023-08-21 DIAGNOSIS — D84.9 IMMUNOCOMPROMISED PATIENT: ICD-10-CM

## 2023-08-21 DIAGNOSIS — C77.3 SECONDARY AND UNSPECIFIED MALIGNANT NEOPLASM OF AXILLA AND UPPER LIMB LYMPH NODES: ICD-10-CM

## 2023-08-21 DIAGNOSIS — N18.4 ANEMIA ASSOCIATED WITH STAGE 4 CHRONIC RENAL FAILURE: Primary | ICD-10-CM

## 2023-08-21 DIAGNOSIS — M81.8 OTHER OSTEOPOROSIS WITHOUT CURRENT PATHOLOGICAL FRACTURE: ICD-10-CM

## 2023-08-21 DIAGNOSIS — N18.4 CKD (CHRONIC KIDNEY DISEASE) STAGE 4, GFR 15-29 ML/MIN: Chronic | ICD-10-CM

## 2023-08-21 DIAGNOSIS — N18.4 ANEMIA ASSOCIATED WITH STAGE 4 CHRONIC RENAL FAILURE: Chronic | ICD-10-CM

## 2023-08-21 DIAGNOSIS — Z94.1 HEART TRANSPLANTED: Primary | Chronic | ICD-10-CM

## 2023-08-21 DIAGNOSIS — D05.12 DUCTAL CARCINOMA IN SITU (DCIS) OF LEFT BREAST: ICD-10-CM

## 2023-08-21 PROCEDURE — 99999 PR PBB SHADOW E&M-EST. PATIENT-LVL IV: CPT | Mod: PBBFAC,HCNC,,

## 2023-08-21 PROCEDURE — 1125F PR PAIN SEVERITY QUANTIFIED, PAIN PRESENT: ICD-10-PCS | Mod: HCNC,CPTII,S$GLB,

## 2023-08-21 PROCEDURE — 1157F PR ADVANCE CARE PLAN OR EQUIV PRESENT IN MEDICAL RECORD: ICD-10-PCS | Mod: HCNC,CPTII,S$GLB,

## 2023-08-21 PROCEDURE — 1159F PR MEDICATION LIST DOCUMENTED IN MEDICAL RECORD: ICD-10-PCS | Mod: HCNC,CPTII,S$GLB,

## 2023-08-21 PROCEDURE — 1101F PT FALLS ASSESS-DOCD LE1/YR: CPT | Mod: HCNC,CPTII,S$GLB,

## 2023-08-21 PROCEDURE — 3008F BODY MASS INDEX DOCD: CPT | Mod: HCNC,CPTII,S$GLB,

## 2023-08-21 PROCEDURE — 1157F ADVNC CARE PLAN IN RCRD: CPT | Mod: HCNC,CPTII,S$GLB,

## 2023-08-21 PROCEDURE — 3078F DIAST BP <80 MM HG: CPT | Mod: HCNC,CPTII,S$GLB,

## 2023-08-21 PROCEDURE — 96372 THER/PROPH/DIAG INJ SC/IM: CPT | Mod: HCNC

## 2023-08-21 PROCEDURE — 63600175 PHARM REV CODE 636 W HCPCS: Mod: JZ,EC,JG,HCNC

## 2023-08-21 PROCEDURE — 3044F PR MOST RECENT HEMOGLOBIN A1C LEVEL <7.0%: ICD-10-PCS | Mod: HCNC,CPTII,S$GLB,

## 2023-08-21 PROCEDURE — 3288F FALL RISK ASSESSMENT DOCD: CPT | Mod: HCNC,CPTII,S$GLB,

## 2023-08-21 PROCEDURE — 3078F PR MOST RECENT DIASTOLIC BLOOD PRESSURE < 80 MM HG: ICD-10-PCS | Mod: HCNC,CPTII,S$GLB,

## 2023-08-21 PROCEDURE — 3288F PR FALLS RISK ASSESSMENT DOCUMENTED: ICD-10-PCS | Mod: HCNC,CPTII,S$GLB,

## 2023-08-21 PROCEDURE — 1101F PR PT FALLS ASSESS DOC 0-1 FALLS W/OUT INJ PAST YR: ICD-10-PCS | Mod: HCNC,CPTII,S$GLB,

## 2023-08-21 PROCEDURE — 99999 PR PBB SHADOW E&M-EST. PATIENT-LVL IV: ICD-10-PCS | Mod: PBBFAC,HCNC,,

## 2023-08-21 PROCEDURE — 3074F PR MOST RECENT SYSTOLIC BLOOD PRESSURE < 130 MM HG: ICD-10-PCS | Mod: HCNC,CPTII,S$GLB,

## 2023-08-21 PROCEDURE — 99214 OFFICE O/P EST MOD 30 MIN: CPT | Mod: HCNC,S$GLB,,

## 2023-08-21 PROCEDURE — 1159F MED LIST DOCD IN RCRD: CPT | Mod: HCNC,CPTII,S$GLB,

## 2023-08-21 PROCEDURE — 3066F NEPHROPATHY DOC TX: CPT | Mod: HCNC,CPTII,S$GLB,

## 2023-08-21 PROCEDURE — 3008F PR BODY MASS INDEX (BMI) DOCUMENTED: ICD-10-PCS | Mod: HCNC,CPTII,S$GLB,

## 2023-08-21 PROCEDURE — 3044F HG A1C LEVEL LT 7.0%: CPT | Mod: HCNC,CPTII,S$GLB,

## 2023-08-21 PROCEDURE — 1125F AMNT PAIN NOTED PAIN PRSNT: CPT | Mod: HCNC,CPTII,S$GLB,

## 2023-08-21 PROCEDURE — 99214 PR OFFICE/OUTPT VISIT, EST, LEVL IV, 30-39 MIN: ICD-10-PCS | Mod: HCNC,S$GLB,,

## 2023-08-21 PROCEDURE — 3066F PR DOCUMENTATION OF TREATMENT FOR NEPHROPATHY: ICD-10-PCS | Mod: HCNC,CPTII,S$GLB,

## 2023-08-21 PROCEDURE — 3074F SYST BP LT 130 MM HG: CPT | Mod: HCNC,CPTII,S$GLB,

## 2023-08-21 RX ADMIN — EPOETIN ALFA-EPBX 20000 UNITS: 20000 INJECTION, SOLUTION INTRAVENOUS; SUBCUTANEOUS at 02:08

## 2023-08-21 NOTE — PROGRESS NOTES
Subjective:       Patient ID: Nadia Damon is a 68 y.o. female.    Chief Complaint: Anemia    Primary Oncologist/Hematologist: Dr. Collins     HPI: Ms. Damon is a 68 year old female who is following up for her anemia due to CKD. Epo has been initiated, last dose 23-20kU.  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 She was treated with 1 cycle of systemic chemotherapy cytoxan and taxotere and udenyca that was discontinued due to toxicity. She had radiation, completed 22.   She then has abnormal mammogram of R breast. S/p bx, which is benign.   Pmhx: heart transplant 26 yrs ago, on anti rejection medication. chronic back pain and disc degeneration, s/p ROBOTIC SACROCOLPOPEXY, ABDOMEN, ROBOTIC URETHROPEXY, ROBOTIC OOPHORECTOMY (Right) on 8/10/23 due to  stress urinary incontinence and vaginal prolapse.    Today: She is recovering from surgery. She is a little sore but denies any fevers, illnesses, increased pain. Swelling, n/v/d/c. She has recently started tx for vaginal yeast infection. She states she has not had any recurrent incontinence since surgery. She is learning how to urinate on her own now. She is not taking oral iron. She continues to take vitamin D and calcium.     Social History     Socioeconomic History    Marital status: Single    Number of children: 2    Highest education level: 11th grade   Occupational History    Occupation: Retired   Tobacco Use    Smoking status: Never    Smokeless tobacco: Never   Substance and Sexual Activity    Alcohol use: Never     Alcohol/week: 0.0 standard drinks of alcohol    Drug use: No    Sexual activity: Not Currently     Partners: Male     Birth control/protection: See Surgical Hx   Other Topics Concern    Are you pregnant or think you may be? No    Breast-feeding No   Social History Narrative    Single. 2 children , 1  at 30 yoa  2014 strep throat -  pneumonia and renal  complications after not completing course of AB. Other child lives in [place], [place]. Has a cousin locally that could help in an emergency. 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 Caffeine intake =- 1 cola a day. No coffee, + occasional tea, avoids caffeine especially at night. Still drives. She does not have a Living Will or Advanced directive.      Social Determinants of Health     Financial Resource Strain: Low Risk  (5/5/2021)    Overall Financial Resource Strain (CARDIA)     Difficulty of Paying Living Expenses: Not hard at all   Food Insecurity: No Food Insecurity (5/5/2021)    Hunger Vital Sign     Worried About Running Out of Food in the Last Year: Never true     Ran Out of Food in the Last Year: Never true   Transportation Needs: No Transportation Needs (5/5/2021)    PRAPARE - Transportation     Lack of Transportation (Medical): No     Lack of Transportation (Non-Medical): No   Physical Activity: Insufficiently Active (5/5/2021)    Exercise Vital Sign     Days of Exercise per Week: 2 days     Minutes of Exercise per Session: 10 min   Stress: No Stress Concern Present (5/5/2021)    [employer] [place] [employer] - Occupational Stress Questionnaire     Feeling of Stress : Not at all   Social Connections: Socially Isolated (5/5/2021)    Social Connection and Isolation Panel [NHANES]     Frequency of Communication with Friends and Family: Once a week     Frequency of Social Gatherings with Friends and Family: Once a week     Attends Yazdanism Services: More than 4 times per year     Active Member of Clubs or Organizations: No     Attends Club or Organization Meetings: Never     Marital Status: Never    Housing Stability: Low Risk  (5/5/2021)    Housing Stability Vital Sign     Unable to Pay for Housing in the Last Year: No     Number of Places Lived in the Last Year: 1     Unstable Housing in the Last Year: No       Past Medical History:   Diagnosis Date     Abdominal wall hernia     CT Renal 6/11/2018---Small fat containing superior ventral abdominal wall hernia at the epicardial pacing lead site.    Abnormal mammogram 10/12/2021    GRACE (acute kidney injury) 11/22/2021    Anxiety     Arthritis     ZEN HIPS    Breast cancer in female 08/2021    LEFT BREAST    C. difficile colitis 11/29/2021    Cellulitis of axilla, left 12/23/2021    Chronic diastolic heart failure 12/16/2021    Chronic kidney disease     stage 4, GFR 15-29 ml/min    Chronic midline low back pain without sciatica 06/18/2018    Closed nondisplaced fracture of distal phalanx of left great toe with routine healing 10/22/2018    Coronary artery disease 1993    heart transplant    Cystitis 05/10/2022    Depression     Encounter for blood transfusion     Fibromyalgia     on Lyrica    Heart failure     native heart cardiomyopathy    Heart transplanted 1993    due to cardiomyopathy    History of hyperparathyroidism; Hyperparathyroidism, secondary renal     PT DENIES    Hypertension     Immune disorder     anti rejection meds    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    Impaired mobility 07/28/2022    Iron deficiency anemia 08/15/2017    Kidney stones     passed per pt    Obesity     Other osteoporosis without current pathological fracture 08/30/2019    Severe sepsis 11/22/2021    Shingles 2003 approx    left leg    Subclinical hypothyroidism 06/16/2023    Thrombocytopenia, unspecified 11/29/2021    Trouble in sleeping     Urinary incontinence        Family History   Problem Relation Age of Onset    Cancer Mother 38        breast    Breast cancer Mother     Breast cancer Maternal Grandmother     Heart disease Maternal Grandmother     Hypertension Son     Cataracts Cousin     Diabetes Neg Hx     Stroke Neg Hx     Kidney disease Neg Hx     Asthma Neg Hx     COPD Neg Hx     Melanoma Neg Hx     Hyperlipidemia Neg Hx        Past Surgical History:   Procedure Laterality Date    BLADDER SURGERY   2015 approx    mesh - Dr Everett then 2nd reconstructive sx Dr Onofre    BREAST BIOPSY Bilateral     NEGATIVE    BREAST BIOPSY Right 10/31/2022    benign    BREAST LUMPECTOMY Left 2021    BREAST SURGERY Left 09/28/2015    Bx - benign    BREAST SURGERY Right 12/2015    Bx benign    CARDIAC PACEMAKER REMOVAL Left 06/26/2014    Pacer defirillator removed. Put in 1993 aat time of heart transplant    CARPAL TUNNEL RELEASE Left 03/03/2015    Dr. Hall    COLONOSCOPY N/A 02/25/2021    Procedure: COLONOSCOPY;  Surgeon: Freida Ramirez MD;  Location: [facility] ENDO;  Service: Endoscopy;  Laterality: N/A;    CYSTOCELE REPAIR      Twice with mesh removal    EPIDURAL STEROID INJECTION INTO CERVICAL SPINE N/A 02/02/2023    Procedure: T11/T12 IL HELLEN;  Surgeon: Jassi Pierre MD;  Location: [facility] PAIN MGT;  Service: Pain Management;  Laterality: N/A;    HEART TRANSPLANT  1993    HERNIA REPAIR Right 1971 approx    Inguinal    HYSTERECTOMY  1983    vag hyst /LSO     INCISION AND DRAINAGE OF ABSCESS Left 12/24/2021    Procedure: INCISION AND DRAINAGE, ABSCESS;  Surgeon: Joseph Longo MD;  Location: [facility] OR;  Service: General;  Laterality: Left;    INJECTION OF ANESTHETIC AGENT AROUND MEDIAL BRANCH NERVES INNERVATING LUMBAR FACET JOINT Right 10/19/2022    Procedure: Right L4/L5 and L5/S1 MBB;  Surgeon: Jassi Pierre MD;  Location: [facility] PAIN MGT;  Service: Pain Management;  Laterality: Right;    INJECTION OF ANESTHETIC AGENT AROUND MEDIAL BRANCH NERVES INNERVATING LUMBAR FACET JOINT Right 11/09/2022    Procedure: Right L4/L5 and L5/S1 MBB;  Surgeon: Jassi Pierre MD;  Location: [facility] PAIN MGT;  Service: Pain Management;  Laterality: Right;    INJECTION OF ANESTHETIC AGENT INTO SACROILIAC JOINT Right 08/22/2022    Procedure: Right SIJ Injection Right L5/S1 Facte Injection;  Surgeon: Jassi Pierre MD;  Location: [facility] PAIN MGT;  Service: Pain Management;  Laterality: Right;    INSERTION OF TUNNELED CENTRAL VENOUS CATHETER  (CVC) WITH SUBCUTANEOUS PORT N/A 11/09/2021    Procedure: [id number];  Surgeon: Christoph Douglas MD;  Location: [facility] OR;  Service: General;  Laterality: N/A;    RADIOFREQUENCY THERMOCOAGULATION Right 12/07/2022    Procedure: Right L4/L5 and L5/S1 Lumbar RFA;  Surgeon: Jassi Pierre MD;  Location: [facility] PAIN MGT;  Service: Pain Management;  Laterality: Right;    REMOVAL OF VASCULAR ACCESS PORT      ROBOT-ASSISTED LAPAROSCOPIC ABDOMINAL SACROCOLPOPEXY N/A 8/10/2023    Procedure: ROBOTIC SACROCOLPOPEXY, ABDOMEN;  Surgeon: PRANAY Villalobos MD;  Location: [facility] OR;  Service: OB/GYN;  Laterality: N/A;    ROBOT-ASSISTED LAPAROSCOPIC OOPHORECTOMY Right 8/10/2023    Procedure: ROBOTIC OOPHORECTOMY;  Surgeon: PRANAY Villalobos MD;  Location: [facility] OR;  Service: OB/GYN;  Laterality: Right;    SENTINEL LYMPH NODE BIOPSY Left 10/12/2021    Procedure: BIOPSY, LYMPH NODE, SENTINEL;  Surgeon: Christoph Duoglas MD;  Location: [facility] OR;  Service: General;  Laterality: Left;    TOE SURGERY      XI ROBOTIC URETHROPEXY N/A 8/10/2023    Procedure: XI ROBOTIC URETHROPEXY;  Surgeon: PRANAY Villalobos MD;  Location: [facility] OR;  Service: OB/GYN;  Laterality: N/A;       Review of Systems   Constitutional:  Positive for fatigue. Negative for activity change, appetite change, chills, diaphoresis, fever and unexpected weight change.   HENT:  Negative for congestion and nosebleeds.    Respiratory:  Negative for cough and shortness of breath.    Cardiovascular:  Negative for chest pain and leg swelling.   Gastrointestinal:  Positive for abdominal pain (soreness from surgery). Negative for blood in stool, constipation, diarrhea, nausea and vomiting.   Genitourinary:  Negative for hematuria.   Musculoskeletal:  Positive for arthralgias and back pain.   Skin:  Negative for color change and pallor.   Allergic/Immunologic: Positive for immunocompromised state.   Neurological:  Positive for numbness. Negative for dizziness, weakness,  light-headedness and headaches.   Hematological:  Does not bruise/bleed easily.         Medication List with Changes/Refills   Current Medications    BIOTIN 10,000 MCG CAP    Take 1 tablet by mouth once daily.    CALCITONIN, SALMON, (FORTICAL) 200 UNIT/ACTUATION NASAL SPRAY    1 spray by Nasal route once daily.    CARVEDILOL (COREG) 6.25 MG TABLET    Take 1 tablet (6.25 mg total) by mouth 2 (two) times daily with meals.    CLOTRIMAZOLE-BETAMETHASONE 1-0.05% (LOTRISONE) CREAM    Apply topically 2 (two) times daily. for 7 days    CYCLOSPORINE MODIFIED, NEORAL, (NEORAL) 25 MG CAPSULE    Take 3 capsules (75 mg total) by mouth 2 (two) times daily.    EVENING PRIMROSE OIL ORAL    Take 1,000 mg by mouth once daily.    FUROSEMIDE (LASIX) 20 MG TABLET    Take 1 tablet (20 mg total) by mouth once daily.    GUAIFENESIN (MUCINEX) 600 MG 12 HR TABLET    Take 1,200 mg by mouth as needed.    HYDRALAZINE (APRESOLINE) 50 MG TABLET    Take 1 tablet (50 mg total) by mouth every 8 (eight) hours. TAKE 1 TABLETS  EVERY 8  HOURS.    HYDROCODONE-ACETAMINOPHEN (NORCO) 5-325 MG PER TABLET    Take 1 tablet by mouth every 4 (four) hours as needed for Pain.    MULTIVITAMIN CAPSULE    Take 1 capsule by mouth once daily.    NIFEDIPINE (PROCARDIA-XL) 30 MG (OSM) 24 HR TABLET    Take 1 tablet (30 mg total) by mouth once daily.    PANTOPRAZOLE (PROTONIX) 40 MG TABLET    TAKE 1 TABLET EVERY DAY    POLYETHYLENE GLYCOL (GLYCOLAX) 17 GRAM/DOSE POWDER    Take 17 g by mouth once daily.    PREGABALIN (LYRICA) 50 MG CAPSULE    Take 1 capsule (50 mg total) by mouth 2 (two) times daily.    PSYLLIUM HUSK (METAMUCIL ORAL)    Take by mouth.    TEMAZEPAM (RESTORIL) 30 MG CAPSULE    TAKE 1 CAPSULE AT BEDTIME    UNABLE TO FIND    medication name: Stool softener (Ducolax) Laxative    VITAMIN E 400 UNIT CAPSULE    Take 400 Units by mouth once daily.    ZOLPIDEM (AMBIEN) 10 MG TAB    TAKE 0.5 to 1 TABLET at bedtime as needed for insomnia     Objective:     Vitals:     08/21/23 1349   BP: 122/75   Pulse: 91   Temp: 97.6 °F (36.4 °C)         Physical Exam  Constitutional:       General: She is not in acute distress.     Appearance: She is not ill-appearing, toxic-appearing or diaphoretic.   HENT:      Head: Normocephalic and atraumatic.   Eyes:      Conjunctiva/sclera: Conjunctivae normal.   Cardiovascular:      Rate and Rhythm: Normal rate.   Pulmonary:      Effort: Pulmonary effort is normal.   Musculoskeletal:      Right lower leg: No edema.   Skin:     General: Skin is warm and dry.      Coloration: Skin is not jaundiced or pale.      Findings: No bruising, erythema, lesion or rash.   Neurological:      Mental Status: She is alert.      Gait: Gait normal.            Labs/Results:  Lab Results   Component Value Date    WBC 3.73 (L) 08/18/2023    RBC 3.31 (L) 08/18/2023    HGB 9.2 (L) 08/18/2023    HCT 29.3 (L) 08/18/2023    MCV 89 08/18/2023    MCH 27.8 08/18/2023    MCHC 31.4 (L) 08/18/2023    RDW 15.3 (H) 08/18/2023     08/18/2023    MPV 9.4 08/18/2023    GRAN 2.3 08/18/2023    GRAN 62.3 08/18/2023    LYMPH 0.8 (L) 08/18/2023    LYMPH 20.4 08/18/2023    MONO 0.5 08/18/2023    MONO 12.9 08/18/2023    EOS 0.1 08/18/2023    BASO 0.01 08/18/2023    EOSINOPHIL 3.8 08/18/2023    BASOPHIL 0.3 08/18/2023     CMP  Sodium   Date Value Ref Range Status   08/18/2023 145 136 - 145 mmol/L Final     Potassium   Date Value Ref Range Status   08/18/2023 4.8 3.5 - 5.1 mmol/L Final     Chloride   Date Value Ref Range Status   08/18/2023 110 95 - 110 mmol/L Final     CO2   Date Value Ref Range Status   08/18/2023 21 (L) 23 - 29 mmol/L Final     Glucose   Date Value Ref Range Status   08/18/2023 102 70 - 110 mg/dL Final     BUN   Date Value Ref Range Status   08/18/2023 39 (H) 8 - 23 mg/dL Final     Creatinine   Date Value Ref Range Status   08/18/2023 2.9 (H) 0.5 - 1.4 mg/dL Final     Calcium   Date Value Ref Range Status   08/18/2023 8.7 8.7 - 10.5 mg/dL Final     Total Protein   Date  Value Ref Range Status   08/18/2023 8.0 6.0 - 8.4 g/dL Final     Albumin   Date Value Ref Range Status   08/18/2023 3.4 (L) 3.5 - 5.2 g/dL Final     Total Bilirubin   Date Value Ref Range Status   08/18/2023 0.5 0.1 - 1.0 mg/dL Final     Comment:     For infants and newborns, interpretation of results should be based  on gestational age, weight and in agreement with clinical  observations.    Premature Infant recommended reference ranges:  Up to 24 hours.............<8.0 mg/dL  Up to 48 hours............<12.0 mg/dL  3-5 days..................<15.0 mg/dL  6-29 days.................<15.0 mg/dL       Alkaline Phosphatase   Date Value Ref Range Status   08/18/2023 124 55 - 135 U/L Final     AST   Date Value Ref Range Status   08/18/2023 26 10 - 40 U/L Final     ALT   Date Value Ref Range Status   08/18/2023 14 10 - 44 U/L Final     Anion Gap   Date Value Ref Range Status   08/18/2023 14 8 - 16 mmol/L Final     eGFR   Date Value Ref Range Status   08/18/2023 17 (A) >60 mL/min/1.73 m^2 Final      Latest Reference Range & Units 08/18/23 12:10   Iron 30 - 160 ug/dL 80   TIBC 250 - 450 ug/dL 281   Saturated Iron 20 - 50 % 28   Transferrin 200 - 375 mg/dL 190 (L)   Ferritin 20.0 - 300.0 ng/mL 121     Assessment:     Problem List Items Addressed This Visit          Cardiac/Vascular    Heart transplanted - Primary (Chronic)       Renal/    CKD (chronic kidney disease) stage 4, GFR 15-29 ml/min (Chronic)       Immunology/Multi System    Immunocompromised patient       Oncology    Anemia associated with stage 4 chronic renal failure (Chronic)    Relevant Orders    CBC Auto Differential    Comprehensive Metabolic Panel    Ferritin    Iron and TIBC    Ductal carcinoma in situ (DCIS) of left breast    Secondary and unspecified malignant neoplasm of axilla and upper limb lymph nodes     Plan:     Ductal carcinoma in situ (DCIS) of left breast  --continue follow up with radiation oncology and survivorship  --triple negative  intraductal breast carcinoma with microinvasion and 1 lymph node positive. She was treated with 1 cycle of systemic chemotherapy cytoxan and taxotere and udenyca that was discontinued due to toxicity. She had radiation, completed 4/14/22  --R abnormal mammogram and Bx-benign.    --continue to follow with them in October for breast exam     Iron deficiency anemia, unspecified iron deficiency anemia type  --iron: 80, sat: 28%, ferritin: 121- WNL  --continue to monitor    Anemia associated with stage 4 chronic renal failure  --hgb:9.2 , hmt:29.3  --hgb ranges from 9-11g/dL.   --patient would like to hold off on any further epo injections if hemoglobin remains over 9g/dl, which is reasonable given her hgb is stable.  --epo 20kU for hgb<10g/dL  --last epo 7/2023  --kidney function consistent       Immunocompromised patient  --on immunosuppressive medications  --continue to monitor  --following up with transplant   --WBC: 3.73     Follow-Up: epo q 2 weeks- epo again on the 6th. . Cbc q 4 weeks. 3 months with cbc cmp iron/tibc ferritin    Kelsie Burk PA-C  Hematology Oncology    Route Chart for Scheduling    Med Onc Chart Routing      Follow up with physician    Follow up with LIA 3 months. epo q 2 weeks- epo again on the 6th. . Cbc q 4 weeks. kelsie- 3 months with cbc cmp iron/tibc ferritin   Infusion scheduling note   epo today and again on the 6th (when she comes for her other appt). then epo q 2 weeks. cbc q 4 weeks.   Injection scheduling note    Labs CBC, ferritin, CMP and iron and TIBC   Scheduling:  Preferred lab:  Lab interval:  cbc q 4 weeks.   Imaging    Pharmacy appointment    Other referrals                Therapy Plan Information  epoetin tristan-epbx injection 20,000 Units  20,000 Units, Subcutaneous, PRN

## 2023-08-24 ENCOUNTER — HOSPITAL ENCOUNTER (OUTPATIENT)
Dept: RADIOLOGY | Facility: HOSPITAL | Age: 68
Discharge: HOME | End: 2023-08-24
Attending: NURSE PRACTITIONER
Payer: MEDICARE

## 2023-08-24 ENCOUNTER — OFFICE VISIT (OUTPATIENT)
Dept: PRIMARY CARE CLINIC | Facility: CLINIC | Age: 68
End: 2023-08-24
Payer: MEDICARE

## 2023-08-24 VITALS
HEART RATE: 88 BPM | DIASTOLIC BLOOD PRESSURE: 58 MMHG | HEIGHT: 62 IN | TEMPERATURE: 100 F | WEIGHT: 159.81 LBS | BODY MASS INDEX: 29.41 KG/M2 | OXYGEN SATURATION: 98 % | SYSTOLIC BLOOD PRESSURE: 100 MMHG

## 2023-08-24 DIAGNOSIS — R50.9 FEVER, UNSPECIFIED FEVER CAUSE: Primary | ICD-10-CM

## 2023-08-24 DIAGNOSIS — J02.9 PHARYNGITIS, UNSPECIFIED ETIOLOGY: ICD-10-CM

## 2023-08-24 DIAGNOSIS — Z94.1 HEART TRANSPLANTED: Chronic | ICD-10-CM

## 2023-08-24 LAB
CTP QC/QA: YES
S PYO RRNA THROAT QL PROBE: NEGATIVE

## 2023-08-24 PROCEDURE — 93010 EKG 12-LEAD: ICD-10-PCS | Mod: HCNC,S$GLB,, | Performed by: INTERNAL MEDICINE

## 2023-08-24 PROCEDURE — 1159F MED LIST DOCD IN RCRD: CPT | Mod: HCNC,CPTII,S$GLB, | Performed by: NURSE PRACTITIONER

## 2023-08-24 PROCEDURE — 93005 EKG 12-LEAD: ICD-10-PCS | Mod: HCNC,S$GLB,, | Performed by: NURSE PRACTITIONER

## 2023-08-24 PROCEDURE — 1157F PR ADVANCE CARE PLAN OR EQUIV PRESENT IN MEDICAL RECORD: ICD-10-PCS | Mod: HCNC,CPTII,S$GLB, | Performed by: NURSE PRACTITIONER

## 2023-08-24 PROCEDURE — 99999 PR PBB SHADOW E&M-EST. PATIENT-LVL IV: ICD-10-PCS | Mod: PBBFAC,HCNC,, | Performed by: NURSE PRACTITIONER

## 2023-08-24 PROCEDURE — 3044F HG A1C LEVEL LT 7.0%: CPT | Mod: HCNC,CPTII,S$GLB, | Performed by: NURSE PRACTITIONER

## 2023-08-24 PROCEDURE — 3074F SYST BP LT 130 MM HG: CPT | Mod: HCNC,CPTII,S$GLB, | Performed by: NURSE PRACTITIONER

## 2023-08-24 PROCEDURE — 3288F PR FALLS RISK ASSESSMENT DOCUMENTED: ICD-10-PCS | Mod: HCNC,CPTII,S$GLB, | Performed by: NURSE PRACTITIONER

## 2023-08-24 PROCEDURE — 87880 STREP A ASSAY W/OPTIC: CPT | Mod: QW,HCNC,S$GLB, | Performed by: NURSE PRACTITIONER

## 2023-08-24 PROCEDURE — 3044F PR MOST RECENT HEMOGLOBIN A1C LEVEL <7.0%: ICD-10-PCS | Mod: HCNC,CPTII,S$GLB, | Performed by: NURSE PRACTITIONER

## 2023-08-24 PROCEDURE — 1101F PR PT FALLS ASSESS DOC 0-1 FALLS W/OUT INJ PAST YR: ICD-10-PCS | Mod: HCNC,CPTII,S$GLB, | Performed by: NURSE PRACTITIONER

## 2023-08-24 PROCEDURE — 93005 ELECTROCARDIOGRAM TRACING: CPT | Mod: HCNC,S$GLB,, | Performed by: NURSE PRACTITIONER

## 2023-08-24 PROCEDURE — 87880 POCT RAPID STREP A: ICD-10-PCS | Mod: QW,HCNC,S$GLB, | Performed by: NURSE PRACTITIONER

## 2023-08-24 PROCEDURE — 3078F PR MOST RECENT DIASTOLIC BLOOD PRESSURE < 80 MM HG: ICD-10-PCS | Mod: HCNC,CPTII,S$GLB, | Performed by: NURSE PRACTITIONER

## 2023-08-24 PROCEDURE — 3008F BODY MASS INDEX DOCD: CPT | Mod: HCNC,CPTII,S$GLB, | Performed by: NURSE PRACTITIONER

## 2023-08-24 PROCEDURE — 3066F NEPHROPATHY DOC TX: CPT | Mod: HCNC,CPTII,S$GLB, | Performed by: NURSE PRACTITIONER

## 2023-08-24 PROCEDURE — 1157F ADVNC CARE PLAN IN RCRD: CPT | Mod: HCNC,CPTII,S$GLB, | Performed by: NURSE PRACTITIONER

## 2023-08-24 PROCEDURE — 3078F DIAST BP <80 MM HG: CPT | Mod: HCNC,CPTII,S$GLB, | Performed by: NURSE PRACTITIONER

## 2023-08-24 PROCEDURE — 3008F PR BODY MASS INDEX (BMI) DOCUMENTED: ICD-10-PCS | Mod: HCNC,CPTII,S$GLB, | Performed by: NURSE PRACTITIONER

## 2023-08-24 PROCEDURE — 71046 X-RAY EXAM CHEST 2 VIEWS: CPT | Mod: TC,HCNC

## 2023-08-24 PROCEDURE — 99999 PR PBB SHADOW E&M-EST. PATIENT-LVL IV: CPT | Mod: PBBFAC,HCNC,, | Performed by: NURSE PRACTITIONER

## 2023-08-24 PROCEDURE — 71046 X-RAY EXAM CHEST 2 VIEWS: CPT | Mod: 26,HCNC,, | Performed by: RADIOLOGY

## 2023-08-24 PROCEDURE — 3066F PR DOCUMENTATION OF TREATMENT FOR NEPHROPATHY: ICD-10-PCS | Mod: HCNC,CPTII,S$GLB, | Performed by: NURSE PRACTITIONER

## 2023-08-24 PROCEDURE — 1159F PR MEDICATION LIST DOCUMENTED IN MEDICAL RECORD: ICD-10-PCS | Mod: HCNC,CPTII,S$GLB, | Performed by: NURSE PRACTITIONER

## 2023-08-24 PROCEDURE — 1101F PT FALLS ASSESS-DOCD LE1/YR: CPT | Mod: HCNC,CPTII,S$GLB, | Performed by: NURSE PRACTITIONER

## 2023-08-24 PROCEDURE — 93010 ELECTROCARDIOGRAM REPORT: CPT | Mod: HCNC,S$GLB,, | Performed by: INTERNAL MEDICINE

## 2023-08-24 PROCEDURE — 3288F FALL RISK ASSESSMENT DOCD: CPT | Mod: HCNC,CPTII,S$GLB, | Performed by: NURSE PRACTITIONER

## 2023-08-24 PROCEDURE — 99215 OFFICE O/P EST HI 40 MIN: CPT | Mod: HCNC,S$GLB,, | Performed by: NURSE PRACTITIONER

## 2023-08-24 PROCEDURE — 71046 XR CHEST PA AND LATERAL: ICD-10-PCS | Mod: 26,HCNC,, | Performed by: RADIOLOGY

## 2023-08-24 PROCEDURE — 99215 PR OFFICE/OUTPT VISIT, EST, LEVL V, 40-54 MIN: ICD-10-PCS | Mod: HCNC,S$GLB,, | Performed by: NURSE PRACTITIONER

## 2023-08-24 PROCEDURE — 3074F PR MOST RECENT SYSTOLIC BLOOD PRESSURE < 130 MM HG: ICD-10-PCS | Mod: HCNC,CPTII,S$GLB, | Performed by: NURSE PRACTITIONER

## 2023-08-24 NOTE — PROGRESS NOTES
Nadia Damon  08/25/2023  [id number]    Elis Wick MD  Patient Care Team:  Elis Wick MD as PCP - General (Internal Medicine)  Miguel Soni Jr., MD as Consulting Physician (Vascular Surgery)  Ike King MD as Consulting Physician (Cardiology)  Courtney Tubbs MD as Consulting Physician (Cardiology)  Carter Crawford MD as Consulting Physician (Nephrology)  Paxton Vasques OD as Consulting Physician (Optometry)  PRANAY Villalobos MD as Obstetrician (Obstetrics)  Parker Mccarthy IV, MD (Urology)  Ike King MD as Consulting Physician (Cardiology)  Jose Roland MD as Consulting Physician (Dermatology)  Prasanth Johnson MD as Consulting Physician (Rheumatology)  Nora Morin LCSW as   Elis Wick MD as Physician (Internal Medicine)  Lexi Bianchi LCSW as  (Hematology and Oncology)  Candace Saleh LMSW as  (Hematology and Oncology)      [facility] Note      Chief Complaint:  Chief Complaint   Patient presents with    Follow-up     Pt is here for check up post surgery.        History of Present Illness:  HPI    Malaise, low grade fever, hoarseness to voice returned.   Onset of sx yesterday.   Repair of vaginal cuff prolapse 8/10/23.    Some vaginitis sx after procedure tx with Flagyl and topical antifungal. Vaginitis sx resolved.   Followed by hematology for anemia of chronic dz.   To resume Retacrit injection next week.     Incisions healing well. Some soreness slow to resolve to rt abd incision site.         Review of Systems   Constitutional:  Positive for fatigue and fever. Negative for activity change, chills and diaphoresis.   Eyes:  Negative for visual disturbance.   Respiratory:  Negative for cough, chest tightness, shortness of breath and wheezing.    Cardiovascular:  Positive for leg swelling. Negative for chest pain and palpitations.   Gastrointestinal:  Negative for abdominal pain, constipation,  diarrhea, nausea and vomiting.   Genitourinary:  Negative for difficulty urinating, dysuria, pelvic pain and urgency.   Musculoskeletal:  Negative for arthralgias, back pain, myalgias and neck pain.   Neurological:  Negative for dizziness, syncope, weakness, light-headedness and headaches.         The following were reviewed: Active problem list, medication list, allergies, family history, social history, and Health Maintenance.       Medications:  Current Outpatient Medications on File Prior to Visit   Medication Sig Dispense Refill    biotin 10,000 mcg Cap Take 1 tablet by mouth once daily.      cycloSPORINE modified, NEORAL, (NEORAL) 25 MG capsule Take 3 capsules (75 mg total) by mouth 2 (two) times daily. 540 capsule 0    EVENING PRIMROSE OIL ORAL Take 1,000 mg by mouth once daily.      guaiFENesin (MUCINEX) 600 mg 12 hr tablet Take 1,200 mg by mouth as needed.      hydrALAZINE (APRESOLINE) 50 MG tablet Take 1 tablet (50 mg total) by mouth every 8 (eight) hours. TAKE 1 TABLETS  EVERY 8  HOURS. 270 tablet 3    HYDROcodone-acetaminophen (NORCO) 5-325 mg per tablet Take 1 tablet by mouth every 4 (four) hours as needed for Pain. 8 tablet 0    multivitamin capsule Take 1 capsule by mouth once daily.      NIFEdipine (PROCARDIA-XL) 30 MG (OSM) 24 hr tablet Take 1 tablet (30 mg total) by mouth once daily. 30 tablet 11    pantoprazole (PROTONIX) 40 MG tablet TAKE 1 TABLET EVERY DAY 90 tablet 1    polyethylene glycol (GLYCOLAX) 17 gram/dose powder Take 17 g by mouth once daily.      pregabalin (LYRICA) 50 MG capsule Take 1 capsule (50 mg total) by mouth 2 (two) times daily. 180 capsule 0    psyllium husk (METAMUCIL ORAL) Take by mouth.      temazepam (RESTORIL) 30 mg capsule TAKE 1 CAPSULE AT BEDTIME 30 capsule 5    UNABLE TO FIND medication name: Stool softener (Ducolax) Laxative      vitamin E 400 UNIT capsule Take 400 Units by mouth once daily.      zolpidem (AMBIEN) 10 mg Tab TAKE 0.5 to 1 TABLET at bedtime as needed  for insomnia 30 tablet 0    calcitonin, salmon, (FORTICAL) 200 unit/actuation nasal spray 1 spray by Nasal route once daily. 3 each 3    carvediloL (COREG) 6.25 MG tablet Take 1 tablet (6.25 mg total) by mouth 2 (two) times daily with meals. 180 tablet 3    furosemide (LASIX) 20 MG tablet Take 1 tablet (20 mg total) by mouth once daily. (Patient not taking: Reported on 8/7/2023) 90 tablet 1     Current Facility-Administered Medications on File Prior to Visit   Medication Dose Route Frequency Provider Last Rate Last Admin    acetaminophen tablet 1,000 mg  1,000 mg Oral On Call Procedure PRANAY Villalobos MD        famotidine tablet 20 mg  20 mg Oral On Call Procedure PRANAY Villalobos MD           Medications have been reviewed and reconciled with patient at visit today.    Barriers to medications present (no )    Fall since last office visit (no )      Exam:  Vitals:    08/24/23 1443   BP: (!) 100/58   Pulse: 88   Temp: 99.5 °F (37.5 °C)     Weight: 72.5 kg (159 lb 12.8 oz)   Body mass index is 29.23 kg/m².      BP Readings from Last 3 Encounters:   08/24/23 (!) 100/58   08/21/23 127/75   08/21/23 122/75     Wt Readings from Last 3 Encounters:   08/24/23 1443 72.5 kg (159 lb 12.8 oz)   08/21/23 1349 74.7 kg (164 lb 10.9 oz)   08/14/23 1442 72.6 kg (160 lb)            Physical Exam  Constitutional:       General: She is not in acute distress.     Appearance: She is ill-appearing (moderately). She is not toxic-appearing or diaphoretic.   HENT:      Head: Normocephalic.      Right Ear: Tympanic membrane normal. There is no impacted cerumen.      Left Ear: Tympanic membrane normal. There is no impacted cerumen.      Nose: Nose normal.      Mouth/Throat:      Mouth: Mucous membranes are moist.      Pharynx: Oropharyngeal exudate (small amount purulence right posterior pharynx) and posterior oropharyngeal erythema (mild erythema posterior pharynx) present.   Eyes:      General: No scleral icterus.      Conjunctiva/sclera: Conjunctivae normal.   Cardiovascular:      Rate and Rhythm: Normal rate and regular rhythm.      Heart sounds: Murmur (4/6 murmur) heard.   Pulmonary:      Effort: Pulmonary effort is normal. No respiratory distress.      Breath sounds: Normal breath sounds.   Abdominal:      General: There is no distension.      Palpations: Abdomen is soft.      Tenderness: There is no abdominal tenderness. There is no right CVA tenderness, left CVA tenderness or guarding.      Hernia: A hernia (easily reduced upper abd wall hernia) is present.   Musculoskeletal:         General: Swelling (2+ pedal edema bilaterally) present.      Cervical back: Normal range of motion and neck supple. No rigidity or tenderness.   Lymphadenopathy:      Cervical: No cervical adenopathy.   Skin:     General: Skin is warm and dry.      Coloration: Skin is not jaundiced or pale.      Findings: No erythema, lesion or rash.      Comments: 5 abd incisions healing well with small crusts, no induration/erythema/edema. Mild reproduced pain to rt abd incision   Neurological:      General: No focal deficit present.      Mental Status: She is alert. Mental status is at baseline.      Cranial Nerves: No cranial nerve deficit.      Motor: No weakness.   Psychiatric:         Mood and Affect: Mood normal.         Behavior: Behavior normal.         Thought Content: Thought content normal.         Laboratory Reviewed: (Yes)  Lab Results   Component Value Date    WBC 4.73 08/24/2023    HGB 9.5 (L) 08/24/2023    HCT 31.2 (L) 08/24/2023     08/24/2023    CHOL 169 06/20/2023    TRIG 97 06/20/2023    HDL 44 06/20/2023    ALT 17 08/24/2023    AST 29 08/24/2023     08/24/2023    K 5.1 08/24/2023     08/24/2023    CREATININE 2.7 (H) 08/24/2023    BUN 35 (H) 08/24/2023    CO2 23 08/24/2023    TSH 8.358 (H) 06/20/2023    PSA <0.1 05/27/2008    INR 1.0 11/22/2021    HGBA1C 5.1 03/08/2023           Health Maintenance  Health Maintenance  Topics with due status: Not Due       Topic Last Completion Date    Pneumococcal Vaccines (Age 65+) 10/22/2018    TETANUS VACCINE 09/09/2020    Colorectal Cancer Screening 02/25/2021    Influenza Vaccine 11/11/2022    DEXA Scan 01/25/2023    Hemoglobin A1c (Diabetic Prevention Screening) 03/08/2023    Mammogram 04/10/2023    Lipid Panel 06/20/2023     Health Maintenance Due   Topic Date Due    COVID-19 Vaccine (4 - Moderna risk series) 06/22/2023         Assessment:  Problem List Items Addressed This Visit          ENT    Pharyngitis    Relevant Orders    CULTURE, RESPIRATORY  - THROAT    CULTURE, RESPIRATORY  - THROAT    CULTURE, RESPIRATORY  - THROAT       Cardiac/Vascular    Heart transplanted (Chronic)    Relevant Orders    IN OFFICE EKG 12-LEAD (to Muse) (Completed)    CULTURE, BLOOD    CULTURE, BLOOD    Echo       Other    Fever - Primary     Unclear cause with pharyngitis,  new heart murmur. EKG at baseline, rapid strep negative. UA and CXR at baseline. CBC, CMP at/near baseline. Elevated ESR and CRP elevated, but lower than previous. Blood cx x 2, throat cx and ECHO today. Message sent to GYN, ENT, transplant cardiologist.          Relevant Orders    CBC Auto Differential (Completed)    Comprehensive Metabolic Panel (Completed)    Urinalysis, Reflex to Urine Culture Urine, Clean Catch (Completed)    C-REACTIVE PROTEIN (Completed)    Sedimentation rate (Completed)    X-Ray Chest PA And Lateral (Completed)    POCT Rapid Strep A (Completed)    CULTURE, RESPIRATORY  - THROAT    SARS Coronavirus 2 Antigen, POCT Manual Read    CULTURE, BLOOD    CULTURE, BLOOD    CULTURE, RESPIRATORY  - THROAT    Echo    CULTURE, RESPIRATORY  - THROAT         Plan:  Fever, unspecified fever cause  -     CBC Auto Differential; Future; Expected date: 08/24/2023  -     Comprehensive Metabolic Panel; Future; Expected date: 08/24/2023  -     Urinalysis, Reflex to Urine Culture Urine, Clean Catch; Future; Expected date: 08/24/2023  -      C-REACTIVE PROTEIN; Future; Expected date: 08/24/2023  -     Sedimentation rate; Future; Expected date: 08/24/2023  -     X-Ray Chest PA And Lateral  -     POCT Rapid Strep A  -     CULTURE, RESPIRATORY  - THROAT  -     SARS Coronavirus 2 Antigen, POCT Manual Read  -     CULTURE, BLOOD; Future; Expected date: 08/25/2023  -     CULTURE, BLOOD; Future; Expected date: 08/25/2023  -     CULTURE, RESPIRATORY  - THROAT  -     Echo; Future; Expected date: 08/25/2023  -     CULTURE, RESPIRATORY  - THROAT; Future; Expected date: 08/25/2023    Heart transplanted  -     IN OFFICE EKG 12-LEAD (to Muse)  -     CULTURE, BLOOD; Future; Expected date: 08/25/2023  -     CULTURE, BLOOD; Future; Expected date: 08/25/2023  -     Echo; Future; Expected date: 08/25/2023    Pharyngitis, unspecified etiology  -     CULTURE, RESPIRATORY  - THROAT  -     CULTURE, RESPIRATORY  - THROAT  -     CULTURE, RESPIRATORY  - THROAT; Future; Expected date: 08/25/2023      -Patient's lab results were reviewed and discussed with patient  -Treatment options and alternatives were discussed with the patient. Patient expressed understanding. Patient was given the opportunity to ask questions and be an active participant in their medical care. Patient had no further questions or concerns at this time.   -Documentation of patient's health and condition was obtained from family member who was present during visit.  -Patient is an overall moderate risk for health complications from their medical conditions.       Follow up: Follow up in about 4 days (around 8/28/2023).      After visit summary printed and given to patient upon discharge.  Patient goals and care plan are included in After visit summary.    Total medical decision making time was 44 min.  The following issues were discussed: The primary encounter diagnosis was Fever, unspecified fever cause. Diagnoses of Heart transplanted and Pharyngitis, unspecified etiology were also pertinent to this  visit.    Health maintenance needs, recent test results and goals of care discussed with pt and questions answered.

## 2023-08-25 ENCOUNTER — OFFICE VISIT (OUTPATIENT)
Dept: OTOLARYNGOLOGY | Facility: CLINIC | Age: 68
End: 2023-08-25
Payer: MEDICARE

## 2023-08-25 ENCOUNTER — HOSPITAL ENCOUNTER (OUTPATIENT)
Dept: CARDIOLOGY | Facility: HOSPITAL | Age: 68
Discharge: HOME | End: 2023-08-25
Attending: NURSE PRACTITIONER
Payer: MEDICARE

## 2023-08-25 ENCOUNTER — CLINICAL SUPPORT (OUTPATIENT)
Dept: INTERNAL MEDICINE | Facility: CLINIC | Age: 68
End: 2023-08-25
Payer: MEDICARE

## 2023-08-25 ENCOUNTER — TELEPHONE (OUTPATIENT)
Dept: OBSTETRICS AND GYNECOLOGY | Facility: CLINIC | Age: 68
End: 2023-08-25

## 2023-08-25 VITALS
DIASTOLIC BLOOD PRESSURE: 58 MMHG | SYSTOLIC BLOOD PRESSURE: 100 MMHG | WEIGHT: 161.63 LBS | BODY MASS INDEX: 29.56 KG/M2 | HEIGHT: 62 IN | WEIGHT: 159 LBS | BODY MASS INDEX: 29.26 KG/M2

## 2023-08-25 DIAGNOSIS — J04.0 VIRAL LARYNGITIS: Primary | ICD-10-CM

## 2023-08-25 DIAGNOSIS — R50.9 FEVER, UNSPECIFIED FEVER CAUSE: ICD-10-CM

## 2023-08-25 DIAGNOSIS — K21.9 GASTROESOPHAGEAL REFLUX DISEASE, UNSPECIFIED WHETHER ESOPHAGITIS PRESENT: ICD-10-CM

## 2023-08-25 DIAGNOSIS — Z94.1 HEART TRANSPLANTED: Chronic | ICD-10-CM

## 2023-08-25 DIAGNOSIS — B97.89 VIRAL LARYNGITIS: Primary | ICD-10-CM

## 2023-08-25 DIAGNOSIS — J02.9 PHARYNGITIS, UNSPECIFIED ETIOLOGY: ICD-10-CM

## 2023-08-25 LAB
AORTIC ROOT ANNULUS: 2.48 CM
ASCENDING AORTA: 3.29 CM
AV INDEX (PROSTH): 0.99
AV MEAN GRADIENT: 2 MMHG
AV PEAK GRADIENT: 4 MMHG
AV VALVE AREA BY VELOCITY RATIO: 3.08 CM²
AV VALVE AREA: 3.11 CM²
AV VELOCITY RATIO: 0.98
BSA FOR ECHO PROCEDURE: 1.78 M2
CV ECHO LV RWT: 0.65 CM
DOP CALC AO PEAK VEL: 0.99 M/S
DOP CALC AO VTI: 20.4 CM
DOP CALC LVOT AREA: 3.1 CM2
DOP CALC LVOT DIAMETER: 2 CM
DOP CALC LVOT PEAK VEL: 0.97 M/S
DOP CALC LVOT STROKE VOLUME: 63.43 CM3
DOP CALC RVOT PEAK VEL: 0.55 M/S
DOP CALC RVOT VTI: 9.3 CM
DOP CALCLVOT PEAK VEL VTI: 20.2 CM
E WAVE DECELERATION TIME: 210.97 MSEC
E/A RATIO: 1.11
E/E' RATIO: 6 M/S
ECHO LV POSTERIOR WALL: 1.29 CM (ref 0.6–1.1)
EJECTION FRACTION: 65 %
FRACTIONAL SHORTENING: 32 % (ref 28–44)
INTERVENTRICULAR SEPTUM: 1.17 CM (ref 0.6–1.1)
IVC DIAMETER: 1.66 CM
IVRT: 91.34 MSEC
LA MAJOR: 7.33 CM
LA MINOR: 6.94 CM
LA WIDTH: 4.3 CM
LEFT ATRIUM SIZE: 3.92 CM
LEFT ATRIUM VOLUME INDEX: 59 ML/M2
LEFT ATRIUM VOLUME: 102.15 CM3
LEFT INTERNAL DIMENSION IN SYSTOLE: 2.67 CM (ref 2.1–4)
LEFT VENTRICLE DIASTOLIC VOLUME INDEX: 39.34 ML/M2
LEFT VENTRICLE DIASTOLIC VOLUME: 68.05 ML
LEFT VENTRICLE MASS INDEX: 97 G/M2
LEFT VENTRICLE SYSTOLIC VOLUME INDEX: 15.2 ML/M2
LEFT VENTRICLE SYSTOLIC VOLUME: 26.25 ML
LEFT VENTRICULAR INTERNAL DIMENSION IN DIASTOLE: 3.95 CM (ref 3.5–6)
LEFT VENTRICULAR MASS: 168.45 G
LV LATERAL E/E' RATIO: 5 M/S
LV SEPTAL E/E' RATIO: 7.5 M/S
LVOT MG: 2.15 MMHG
LVOT MV: 0.69 CM/S
MV PEAK A VEL: 0.54 M/S
MV PEAK E VEL: 0.6 M/S
MV STENOSIS PRESSURE HALF TIME: 61.18 MS
MV VALVE AREA P 1/2 METHOD: 3.6 CM2
PISA TR MAX VEL: 2.87 M/S
PV MEAN GRADIENT: 1 MMHG
PV PEAK GRADIENT: 2 MMHG
PV PEAK VELOCITY: 0.77 M/S
RA MAJOR: 6.24 CM
RA PRESSURE ESTIMATED: 3 MMHG
RA WIDTH: 3.18 CM
RIGHT VENTRICULAR END-DIASTOLIC DIMENSION: 4.33 CM
RV TB RVSP: 6 MMHG
SINUS: 3.33 CM
STJ: 3.31 CM
TDI LATERAL: 0.12 M/S
TDI SEPTAL: 0.08 M/S
TDI: 0.1 M/S
TR MAX PG: 33 MMHG
TRICUSPID ANNULAR PLANE SYSTOLIC EXCURSION: 1.3 CM
TV REST PULMONARY ARTERY PRESSURE: 36 MMHG
Z-SCORE OF LEFT VENTRICULAR DIMENSION IN END DIASTOLE: -1.93
Z-SCORE OF LEFT VENTRICULAR DIMENSION IN END SYSTOLE: -0.83

## 2023-08-25 PROCEDURE — 99213 PR OFFICE/OUTPT VISIT, EST, LEVL III, 20-29 MIN: ICD-10-PCS | Mod: 25,HCNC,S$GLB, | Performed by: STUDENT IN AN ORGANIZED HEALTH CARE EDUCATION/TRAINING PROGRAM

## 2023-08-25 PROCEDURE — 3066F PR DOCUMENTATION OF TREATMENT FOR NEPHROPATHY: ICD-10-PCS | Mod: HCNC,CPTII,S$GLB, | Performed by: STUDENT IN AN ORGANIZED HEALTH CARE EDUCATION/TRAINING PROGRAM

## 2023-08-25 PROCEDURE — 1101F PT FALLS ASSESS-DOCD LE1/YR: CPT | Mod: HCNC,CPTII,S$GLB, | Performed by: STUDENT IN AN ORGANIZED HEALTH CARE EDUCATION/TRAINING PROGRAM

## 2023-08-25 PROCEDURE — 1159F MED LIST DOCD IN RCRD: CPT | Mod: HCNC,CPTII,S$GLB, | Performed by: STUDENT IN AN ORGANIZED HEALTH CARE EDUCATION/TRAINING PROGRAM

## 2023-08-25 PROCEDURE — 93306 TTE W/DOPPLER COMPLETE: CPT | Mod: HCNC

## 2023-08-25 PROCEDURE — 99999 PR PBB SHADOW E&M-EST. PATIENT-LVL III: CPT | Mod: PBBFAC,HCNC,, | Performed by: STUDENT IN AN ORGANIZED HEALTH CARE EDUCATION/TRAINING PROGRAM

## 2023-08-25 PROCEDURE — 99999 PR PBB SHADOW E&M-EST. PATIENT-LVL III: ICD-10-PCS | Mod: PBBFAC,HCNC,, | Performed by: STUDENT IN AN ORGANIZED HEALTH CARE EDUCATION/TRAINING PROGRAM

## 2023-08-25 PROCEDURE — 3288F FALL RISK ASSESSMENT DOCD: CPT | Mod: HCNC,CPTII,S$GLB, | Performed by: STUDENT IN AN ORGANIZED HEALTH CARE EDUCATION/TRAINING PROGRAM

## 2023-08-25 PROCEDURE — 3066F NEPHROPATHY DOC TX: CPT | Mod: HCNC,CPTII,S$GLB, | Performed by: STUDENT IN AN ORGANIZED HEALTH CARE EDUCATION/TRAINING PROGRAM

## 2023-08-25 PROCEDURE — 93306 ECHO (CUPID ONLY): ICD-10-PCS | Mod: 26,HCNC,, | Performed by: INTERNAL MEDICINE

## 2023-08-25 PROCEDURE — 1157F ADVNC CARE PLAN IN RCRD: CPT | Mod: HCNC,CPTII,S$GLB, | Performed by: STUDENT IN AN ORGANIZED HEALTH CARE EDUCATION/TRAINING PROGRAM

## 2023-08-25 PROCEDURE — 31575 DIAGNOSTIC LARYNGOSCOPY: CPT | Mod: HCNC,S$GLB,, | Performed by: STUDENT IN AN ORGANIZED HEALTH CARE EDUCATION/TRAINING PROGRAM

## 2023-08-25 PROCEDURE — 3044F PR MOST RECENT HEMOGLOBIN A1C LEVEL <7.0%: ICD-10-PCS | Mod: HCNC,CPTII,S$GLB, | Performed by: STUDENT IN AN ORGANIZED HEALTH CARE EDUCATION/TRAINING PROGRAM

## 2023-08-25 PROCEDURE — 3008F PR BODY MASS INDEX (BMI) DOCUMENTED: ICD-10-PCS | Mod: HCNC,CPTII,S$GLB, | Performed by: STUDENT IN AN ORGANIZED HEALTH CARE EDUCATION/TRAINING PROGRAM

## 2023-08-25 PROCEDURE — 3044F HG A1C LEVEL LT 7.0%: CPT | Mod: HCNC,CPTII,S$GLB, | Performed by: STUDENT IN AN ORGANIZED HEALTH CARE EDUCATION/TRAINING PROGRAM

## 2023-08-25 PROCEDURE — 1159F PR MEDICATION LIST DOCUMENTED IN MEDICAL RECORD: ICD-10-PCS | Mod: HCNC,CPTII,S$GLB, | Performed by: STUDENT IN AN ORGANIZED HEALTH CARE EDUCATION/TRAINING PROGRAM

## 2023-08-25 PROCEDURE — 99213 OFFICE O/P EST LOW 20 MIN: CPT | Mod: 25,HCNC,S$GLB, | Performed by: STUDENT IN AN ORGANIZED HEALTH CARE EDUCATION/TRAINING PROGRAM

## 2023-08-25 PROCEDURE — 1157F PR ADVANCE CARE PLAN OR EQUIV PRESENT IN MEDICAL RECORD: ICD-10-PCS | Mod: HCNC,CPTII,S$GLB, | Performed by: STUDENT IN AN ORGANIZED HEALTH CARE EDUCATION/TRAINING PROGRAM

## 2023-08-25 PROCEDURE — 3288F PR FALLS RISK ASSESSMENT DOCUMENTED: ICD-10-PCS | Mod: HCNC,CPTII,S$GLB, | Performed by: STUDENT IN AN ORGANIZED HEALTH CARE EDUCATION/TRAINING PROGRAM

## 2023-08-25 PROCEDURE — 1101F PR PT FALLS ASSESS DOC 0-1 FALLS W/OUT INJ PAST YR: ICD-10-PCS | Mod: HCNC,CPTII,S$GLB, | Performed by: STUDENT IN AN ORGANIZED HEALTH CARE EDUCATION/TRAINING PROGRAM

## 2023-08-25 PROCEDURE — 93306 TTE W/DOPPLER COMPLETE: CPT | Mod: 26,HCNC,, | Performed by: INTERNAL MEDICINE

## 2023-08-25 PROCEDURE — 31575 PR LARYNGOSCOPY, FLEXIBLE; DIAGNOSTIC: ICD-10-PCS | Mod: HCNC,S$GLB,, | Performed by: STUDENT IN AN ORGANIZED HEALTH CARE EDUCATION/TRAINING PROGRAM

## 2023-08-25 PROCEDURE — 87070 CULTURE OTHR SPECIMN AEROBIC: CPT | Mod: HCNC | Performed by: NURSE PRACTITIONER

## 2023-08-25 PROCEDURE — 3008F BODY MASS INDEX DOCD: CPT | Mod: HCNC,CPTII,S$GLB, | Performed by: STUDENT IN AN ORGANIZED HEALTH CARE EDUCATION/TRAINING PROGRAM

## 2023-08-25 RX ORDER — PANTOPRAZOLE SODIUM 20 MG/1
20 TABLET, DELAYED RELEASE ORAL DAILY
Qty: 30 TABLET | Refills: 11 | Status: SHIPPED | OUTPATIENT
Start: 2023-08-25 | End: 2024-08-24

## 2023-08-25 NOTE — ASSESSMENT & PLAN NOTE
Unclear cause with pharyngitis,  new heart murmur. EKG at baseline, rapid strep negative. UA and CXR at baseline. CBC, CMP at/near baseline. Elevated ESR and CRP elevated, but lower than previous. Blood cx x 2, throat cx and ECHO today. Message sent to GYN, ENT, transplant cardiologist.

## 2023-08-25 NOTE — PROGRESS NOTES
Patients presents today for throat culture. Specimen collected. Pt tolerated well. Advised pt that we will call her with the results of the test. Pt verbalized understanding.

## 2023-08-25 NOTE — PROGRESS NOTES
Chief complaint:    Chief Complaint   Patient presents with    Sore Throat     Pt states that her pcp told her that she saw puss on the back of her throat on yesterday           Referring Provider:  No referring provider defined for this encounter.      History of present illness:     Ms. Damon is a 68 y.o. w/history of heart transplant presenting for evaluation of dysphonia since post op vaginal cuff repair on 8/10/23.    Over the last few days has had  malaise and low grade fever, chills.    Initially voice was hoarse after surgery, improved, but worsened the last few days again.      Denies facial pressure/pain, nasal obstruction, purulent rhinorrhea, or anosmia/hyposmia.      The patient denies neck mass, odynophagia, dysphagia, otalgia, unusual bleeding, or unintentional weight loss.     EKG, UA and CXR at baseline, rapid strep and COVID negative.     Planning to get blood cultures, ECHO, throat culture.      History      Past Medical History:   Past Medical History:   Diagnosis Date    Abdominal wall hernia     CT Renal 6/11/2018---Small fat containing superior ventral abdominal wall hernia at the epicardial pacing lead site.    Abnormal mammogram 10/12/2021    GRACE (acute kidney injury) 11/22/2021    Anxiety     Arthritis     ZEN HIPS    Breast cancer in female 08/2021    LEFT BREAST    C. difficile colitis 11/29/2021    Cellulitis of axilla, left 12/23/2021    Chronic diastolic heart failure 12/16/2021    Chronic kidney disease     stage 4, GFR 15-29 ml/min    Chronic midline low back pain without sciatica 06/18/2018    Closed nondisplaced fracture of distal phalanx of left great toe with routine healing 10/22/2018    Coronary artery disease 1993    heart transplant    Cystitis 05/10/2022    Depression     Encounter for blood transfusion     Fibromyalgia     on Lyrica    Heart failure     native heart cardiomyopathy    Heart transplanted 1993    due to cardiomyopathy    History of hyperparathyroidism;  Hyperparathyroidism, secondary renal     PT DENIES    Hypertension     Immune disorder     anti rejection meds    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    Impaired mobility 07/28/2022    Iron deficiency anemia 08/15/2017    Kidney stones     passed per pt    Obesity     Other osteoporosis without current pathological fracture 08/30/2019    Severe sepsis 11/22/2021    Shingles 2003 approx    left leg    Subclinical hypothyroidism 06/16/2023    Thrombocytopenia, unspecified 11/29/2021    Trouble in sleeping     Urinary incontinence          Past Surgical History:  Past Surgical History:   Procedure Laterality Date    BLADDER SURGERY  2015 approx    mesh - Dr Everett then 2nd reconstructive sx Dr Onofre    BREAST BIOPSY Bilateral     NEGATIVE    BREAST BIOPSY Right 10/31/2022    benign    BREAST LUMPECTOMY Left 2021    BREAST SURGERY Left 09/28/2015    Bx - benign    BREAST SURGERY Right 12/2015    Bx benign    CARDIAC PACEMAKER REMOVAL Left 06/26/2014    Pacer defirillator removed. Put in 1993 aat time of heart transplant    CARPAL TUNNEL RELEASE Left 03/03/2015    Dr. Hall    COLONOSCOPY N/A 02/25/2021    Procedure: COLONOSCOPY;  Surgeon: Freida Ramirez MD;  Location: [facility];  Service: Endoscopy;  Laterality: N/A;    CYSTOCELE REPAIR      Twice with mesh removal    EPIDURAL STEROID INJECTION INTO CERVICAL SPINE N/A 02/02/2023    Procedure: T11/T12 IL HELLEN;  Surgeon: Jassi Pierre MD;  [facility];  Service: Pain Management;  Laterality: N/A;    HEART TRANSPLANT  1993    HERNIA REPAIR Right 1971 approx    Inguinal    HYSTERECTOMY  1983    vag hyst /LSO     INCISION AND DRAINAGE OF ABSCESS Left 12/24/2021    Procedure: INCISION AND DRAINAGE, ABSCESS;  Surgeon: Joseph Longo MD;  Location: [facility] OR;  Service: General;  Laterality: Left;    INJECTION OF ANESTHETIC AGENT AROUND MEDIAL BRANCH NERVES INNERVATING LUMBAR FACET JOINT Right 10/19/2022    Procedure: Right L4/L5 and  L5/S1 MBB;  Surgeon: Jassi Pierre MD;  Location: [facility]IN [facility]GT;  Service: Pain Management;  Laterality: Right;    INJECTION OF ANESTHETIC AGENT AROUND MEDIAL BRANCH NERVES INNERVATING LUMBAR FACET JOINT Right 11/09/2022    Procedure: Right L4/L5 and L5/S1 MBB;  Surgeon: Jassi Pierre MD;  [facility];  Service: Pain Management;  Laterality: Right;    INJECTION OF ANESTHETIC AGENT INTO SACROILIAC JOINT Right 08/22/2022    Procedure: Right SIJ Injection Right L5/S1 Facte Injection;  Surgeon: Jassi Pierre MD;  Location: [facility]IN [facility]GT;  Service: Pain Management;  Laterality: Right;    INSERTION OF TUNNELED CENTRAL VENOUS CATHETER (CVC) WITH SUBCUTANEOUS PORT N/A 11/09/2021    Procedure: [id number];  Surgeon: Christoph Douglas MD;  Location: [facility] OR;  Service: General;  Laterality: N/A;    RADIOFREQUENCY THERMOCOAGULATION Right 12/07/2022    Procedure: Right L4/L5 and L5/S1 Lumbar RFA;  Surgeon: Jassi Pierre MD;  Location: [facility];  Service: Pain Management;  Laterality: Right;    REMOVAL OF VASCULAR ACCESS PORT      ROBOT-ASSISTED LAPAROSCOPIC ABDOMINAL SACROCOLPOPEXY N/A 8/10/2023    Procedure: ROBOTIC SACROCOLPOPEXY, ABDOMEN;  Surgeon: PRANAY Villalobos MD;  Location: [facility] OR;  Service: OB/GYN;  Laterality: N/A;    ROBOT-ASSISTED LAPAROSCOPIC OOPHORECTOMY Right 8/10/2023    Procedure: ROBOTIC OOPHORECTOMY;  Surgeon: PRANAY Villalobos MD;  Location: [facility] OR;  Service: OB/GYN;  Laterality: Right;    SENTINEL LYMPH NODE BIOPSY Left 10/12/2021    Procedure: BIOPSY, LYMPH NODE, SENTINEL;  Surgeon: Christoph Douglas MD;  Location: [facility] OR;  Service: General;  Laterality: Left;    TOE SURGERY      XI ROBOTIC URETHROPEXY N/A 8/10/2023    Procedure: XI ROBOTIC URETHROPEXY;  Surgeon: PRANAY Villalobos MD;  Location: [facility] OR;  Service: OB/GYN;  Laterality: N/A;         Medications: Medication list reviewed. She  has a current medication list which includes the following  prescription(s): biotin, cyclosporine modified (neoral), evening primrose oil, guaifenesin, hydralazine, hydrocodone-acetaminophen, multivitamin, nifedipine, pantoprazole, polyethylene glycol, pregabalin, psyllium husk, temazepam, UNABLE TO FIND, vitamin e, zolpidem, calcitonin (salmon), carvedilol, and furosemide, and the following Facility-Administered Medications: acetaminophen and famotidine.     Allergies:   Review of patient's allergies indicates:   Allergen Reactions    Lisinopril Swelling and Rash    Augmentin [amoxicillin-pot clavulanate] Diarrhea         Family history: family history includes Breast cancer in her maternal grandmother and mother; Cancer (age of onset: 38) in her mother; Cataracts in her cousin; Heart disease in her maternal grandmother; Hypertension in her son.         Social History          Alcohol use:  reports no history of alcohol use.            Tobacco:  reports that she has never smoked. She has never used smokeless tobacco.         Physical Examination      Vitals: Weight 73.3 kg (161 lb 9.6 oz), last menstrual period 06/20/1983.      General: Well developed, well nourished, well hydrated.   Voice: no hoarseness, no dysarthria      Head/Face: Normocephalic, atraumatic. No scars or lesions. Facial musculature equal.     Eyes: No scleral icterus or conjunctival hemorrhage. EOMI. PERRLA.     Ears:     Right ear: No gross deformity. EAC is clear of debris and erythema. TM are intact with a pneumatized middle ear. No signs of retraction, fluid or infection.      Left ear: No gross deformity. EAC is clear of debris and erythema. TM are intact with a pneumatized middle ear. No signs of retraction, fluid or infection.      Nose: No gross deformity or lesions. No purulent discharge. No significant NSD.      Mouth/Oropharynx: Lips without any lesions. No mucosal lesions within the oropharynx. No tonsillar exudate or lesions, 2+, small mucus retention cyst on right superior pole.  Pharyngeal  walls symmetrical. Uvula midline. Tongue midline without lesions.     Neck: Trachea midline. No masses. No thyromegaly or nodules palpated.     Lymphatic: No lymphadenopathy in the neck.     Extremities: No cyanosis. Warm and well-perfused.     Skin: No scars or lesions on face or neck.      Neurologic: Moving all extremities without gross abnormality.CN II-XII grossly intact. House-Brackmann 1/6. No signs of nystagmus.          Data reviewed      Review of records:      I reviewed records from the referring provider's office visits.  These describe the history, workup, and/or treatment of this problem thus far.    Labs  WBC 3.90 - 12.70 K/uL 4.73  3.73 Low   3.90  2.85 Low   3.77 Low   3.08 Low   2.66 Low     RBC 4.00 - 5.40 M/uL 3.47 Low   3.31 Low   3.26 Low   3.29 Low   3.23 Low   3.10 Low   3.01 Low     Hemoglobin 12.0 - 16.0 g/dL 9.5 Low   9.2 Low   9.2 Low   9.2 Low   9.0 Low   9.0 Low   8.6 Low     Hematocrit 37.0 - 48.5 % 31.2 Low   29.3 Low   29.5 Low   30.0 Low   29.1 Low   28.8 Low   28.1 Low     MCV 82 - 98 fL 90  89  91  91  90  93  93    MCH 27.0 - 31.0 pg 27.4  27.8  28.2  28.0  27.9  29.0  28.6    MCHC 32.0 - 36.0 g/dL 30.4 Low   31.4 Low   31.2 Low   30.7 Low   30.9 Low   31.3 Low   30.6 Low     RDW 11.5 - 14.5 % 15.7 High   15.3 High   16.3 High   16.0 High   15.6 High   16.0 High   15.9 High     Platelets 150 - 450 K/uL 255  246  202  210  221  228  216    MPV 9.2 - 12.9 fL 10.1  9.4  10.0  9.6  9.4  10.2  10.3    Immature Granulocytes 0.0 - 0.5 % 0.8 High   0.3  0.0  0.4  0.3  0.6 High   0.4    Gran # (ANC) 1.8 - 7.7 K/uL 2.8  2.3  2.4  1.7 Low   2.0  1.8  1.5 Low     Immature Grans (Abs) 0.00 - 0.04 K/uL 0.04  0.01 CM  0.00 CM  0.01 CM  0.01 CM  0.02 CM  0.01 CM          Procedures:    Procedure -Transnasal fiberoptic laryngoscopy     Surgeon: Guicho Godinez M.D. .      Anesthesia: topical 0.05% oxymetazoline with 4% lidocaine      Complications: None.     Description of Procedure: With the  patient in the sitting position, topical lidocaine and oxymetazoline was applied to the nose. The scope was passed through the nose. Examination was carried out of the nose, nasopharynx, oropharynx, hypopharynx, and larynx with findings as noted above. Scope was removed. The patient tolerated the procedure well.      Findings: No masses or lesions in the nose, nasopharynx, oropharynx, hypopharynx, or larynx. Vocal fold abduction and adduction is normal. No pooling of secretions in the piriform sinuses, penetration, or aspiration.     Sinuses clear, but there is copious clear drainage.        Assessment/Plan:      1. Viral laryngitis           Suspect this is viral etiology, no bacterial sinusitis/tonsillitis  We discussed conservative measures - saline gargles, saline spray  Continue workup for other etiologies of malaise/fever  We discussed concerning head/neck symptoms to look out for, if symptoms worsen she will return in 2-3 days          Guicho Godinez MD  [facility]   [facility]  [id number] [place] [address].  [place] [id number]  P: [phone]  F: [phone]

## 2023-08-28 DIAGNOSIS — I10 ESSENTIAL HYPERTENSION: ICD-10-CM

## 2023-08-28 DIAGNOSIS — M81.8 OTHER OSTEOPOROSIS WITHOUT CURRENT PATHOLOGICAL FRACTURE: ICD-10-CM

## 2023-08-28 DIAGNOSIS — Z94.1 HEART TRANSPLANTED: ICD-10-CM

## 2023-08-28 LAB — BACTERIA THROAT CULT: NORMAL

## 2023-08-28 NOTE — TELEPHONE ENCOUNTER
Spoke with pt- states that she is feeling better.  States no issues with throat, denies fever. Pt states that she does not feel she needs to be seen in the clinic today. Advised to call with any concerns.

## 2023-08-29 DIAGNOSIS — F41.9 ANXIETY: Primary | ICD-10-CM

## 2023-08-29 RX ORDER — FUROSEMIDE 20 MG/1
20 TABLET ORAL DAILY
Qty: 90 TABLET | Refills: 1 | Status: SHIPPED | OUTPATIENT
Start: 2023-08-29 | End: 2023-12-19

## 2023-08-29 RX ORDER — CALCITONIN SALMON 200 [IU]/.09ML
1 SPRAY, METERED NASAL DAILY
Qty: 3 EACH | Refills: 3 | Status: SHIPPED | OUTPATIENT
Start: 2023-08-29 | End: 2023-12-04

## 2023-09-06 ENCOUNTER — OFFICE VISIT (OUTPATIENT)
Dept: OBSTETRICS AND GYNECOLOGY | Facility: CLINIC | Age: 68
End: 2023-09-06
Payer: MEDICARE

## 2023-09-06 VITALS — HEIGHT: 62 IN | BODY MASS INDEX: 29.57 KG/M2 | WEIGHT: 160.69 LBS

## 2023-09-06 DIAGNOSIS — F41.9 ANXIETY: ICD-10-CM

## 2023-09-06 DIAGNOSIS — Z87.898 NO POST-OP COMPLICATIONS: Primary | ICD-10-CM

## 2023-09-06 PROBLEM — N39.3 SUI (STRESS URINARY INCONTINENCE, FEMALE): Status: RESOLVED | Noted: 2023-05-25 | Resolved: 2023-09-06

## 2023-09-06 PROBLEM — N99.3 PROLAPSE OF VAGINAL CUFF AFTER HYSTERECTOMY: Status: RESOLVED | Noted: 2023-05-25 | Resolved: 2023-09-06

## 2023-09-06 PROCEDURE — 99999 PR PBB SHADOW E&M-EST. PATIENT-LVL III: CPT | Mod: PBBFAC,HCNC,, | Performed by: OBSTETRICS & GYNECOLOGY

## 2023-09-06 PROCEDURE — 3066F NEPHROPATHY DOC TX: CPT | Mod: HCNC,CPTII,S$GLB, | Performed by: OBSTETRICS & GYNECOLOGY

## 2023-09-06 PROCEDURE — 3288F FALL RISK ASSESSMENT DOCD: CPT | Mod: HCNC,CPTII,S$GLB, | Performed by: OBSTETRICS & GYNECOLOGY

## 2023-09-06 PROCEDURE — 99024 PR POST-OP FOLLOW-UP VISIT: ICD-10-PCS | Mod: HCNC,S$GLB,, | Performed by: OBSTETRICS & GYNECOLOGY

## 2023-09-06 PROCEDURE — 1157F PR ADVANCE CARE PLAN OR EQUIV PRESENT IN MEDICAL RECORD: ICD-10-PCS | Mod: HCNC,CPTII,S$GLB, | Performed by: OBSTETRICS & GYNECOLOGY

## 2023-09-06 PROCEDURE — 3066F PR DOCUMENTATION OF TREATMENT FOR NEPHROPATHY: ICD-10-PCS | Mod: HCNC,CPTII,S$GLB, | Performed by: OBSTETRICS & GYNECOLOGY

## 2023-09-06 PROCEDURE — 1159F MED LIST DOCD IN RCRD: CPT | Mod: HCNC,CPTII,S$GLB, | Performed by: OBSTETRICS & GYNECOLOGY

## 2023-09-06 PROCEDURE — 1101F PT FALLS ASSESS-DOCD LE1/YR: CPT | Mod: HCNC,CPTII,S$GLB, | Performed by: OBSTETRICS & GYNECOLOGY

## 2023-09-06 PROCEDURE — 3008F PR BODY MASS INDEX (BMI) DOCUMENTED: ICD-10-PCS | Mod: HCNC,CPTII,S$GLB, | Performed by: OBSTETRICS & GYNECOLOGY

## 2023-09-06 PROCEDURE — 1126F PR PAIN SEVERITY QUANTIFIED, NO PAIN PRESENT: ICD-10-PCS | Mod: HCNC,CPTII,S$GLB, | Performed by: OBSTETRICS & GYNECOLOGY

## 2023-09-06 PROCEDURE — 1126F AMNT PAIN NOTED NONE PRSNT: CPT | Mod: HCNC,CPTII,S$GLB, | Performed by: OBSTETRICS & GYNECOLOGY

## 2023-09-06 PROCEDURE — 3288F PR FALLS RISK ASSESSMENT DOCUMENTED: ICD-10-PCS | Mod: HCNC,CPTII,S$GLB, | Performed by: OBSTETRICS & GYNECOLOGY

## 2023-09-06 PROCEDURE — 3044F HG A1C LEVEL LT 7.0%: CPT | Mod: HCNC,CPTII,S$GLB, | Performed by: OBSTETRICS & GYNECOLOGY

## 2023-09-06 PROCEDURE — 3008F BODY MASS INDEX DOCD: CPT | Mod: HCNC,CPTII,S$GLB, | Performed by: OBSTETRICS & GYNECOLOGY

## 2023-09-06 PROCEDURE — 1159F PR MEDICATION LIST DOCUMENTED IN MEDICAL RECORD: ICD-10-PCS | Mod: HCNC,CPTII,S$GLB, | Performed by: OBSTETRICS & GYNECOLOGY

## 2023-09-06 PROCEDURE — 1157F ADVNC CARE PLAN IN RCRD: CPT | Mod: HCNC,CPTII,S$GLB, | Performed by: OBSTETRICS & GYNECOLOGY

## 2023-09-06 PROCEDURE — 1101F PR PT FALLS ASSESS DOC 0-1 FALLS W/OUT INJ PAST YR: ICD-10-PCS | Mod: HCNC,CPTII,S$GLB, | Performed by: OBSTETRICS & GYNECOLOGY

## 2023-09-06 PROCEDURE — 3044F PR MOST RECENT HEMOGLOBIN A1C LEVEL <7.0%: ICD-10-PCS | Mod: HCNC,CPTII,S$GLB, | Performed by: OBSTETRICS & GYNECOLOGY

## 2023-09-06 PROCEDURE — 99999 PR PBB SHADOW E&M-EST. PATIENT-LVL III: ICD-10-PCS | Mod: PBBFAC,HCNC,, | Performed by: OBSTETRICS & GYNECOLOGY

## 2023-09-06 PROCEDURE — 99024 POSTOP FOLLOW-UP VISIT: CPT | Mod: HCNC,S$GLB,, | Performed by: OBSTETRICS & GYNECOLOGY

## 2023-09-06 RX ORDER — BUSPIRONE HYDROCHLORIDE 10 MG/1
10 TABLET ORAL
Qty: 90 TABLET | Refills: 1 | OUTPATIENT
Start: 2023-09-06

## 2023-09-06 NOTE — PROGRESS NOTES
"  Subjective:       Patient ID: Nadia Damon is a 68 y.o. female.    Chief Complaint:  Post-op Evaluation      History of Present Illness  HPI  3 weeks post Robotic sacral colpopexy, right salpingo-oophorectomy and Viera procedure   Doing well with relief of the vaginal pressure and pelvic symptoms   Continues to have leakage of urine with full bladder   Advised that the Viera may take a few months to scar in.   Previous sling and other anterior mesh per noted at time of surgery especially on the right side.   If no improvement in 8 weeks, consider collagen periurethral injections     Health Maintenance   Topic Date Due    Mammogram  04/10/2024    Lipid Panel  2024    DEXA Scan  2026    Colorectal Cancer Screening  2026    TETANUS VACCINE  2030    Hepatitis C Screening  Completed    Shingles Vaccine  Completed     GYN & OB History  Patient's last menstrual period was 1983 (within years).   Date of Last Pap: No result found    OB History    Para Term  AB Living   2 0 0     2   SAB IAB Ectopic Multiple Live Births           2      # Outcome Date GA Lbr Tirso/2nd Weight Sex Delivery Anes PTL Lv   2       Vag-Spont      1       Vag-Spont          Review of Systems  Review of Systems        Objective:   BP (P) 100/62   Ht 5' 2" (1.575 m)   Wt 72.9 kg (160 lb 11.5 oz)   LMP 1983 (Within Years)   BMI 29.40 kg/m²    Physical Exam     Assessment:        1. No post-op complications                Plan:            Nadia was seen today for post-op evaluation.    Diagnoses and all orders for this visit:    No post-op complications        "

## 2023-09-11 ENCOUNTER — CLINICAL SUPPORT (OUTPATIENT)
Dept: REHABILITATION | Facility: HOSPITAL | Age: 68
End: 2023-09-11
Payer: MEDICARE

## 2023-09-11 DIAGNOSIS — D05.12 DUCTAL CARCINOMA IN SITU (DCIS) OF LEFT BREAST: Primary | ICD-10-CM

## 2023-09-11 PROCEDURE — 97535 SELF CARE MNGMENT TRAINING: CPT | Mod: HCNC,PN

## 2023-09-11 PROCEDURE — 97140 MANUAL THERAPY 1/> REGIONS: CPT | Mod: HCNC,PN

## 2023-09-13 NOTE — TELEPHONE ENCOUNTER
Spoke with pt and reviewed clinic visit and results, scheduled 3 month labs with Lipid panel at the end of September.   Stage 2: Additional Anesthesia Type: 1% lidocaine with epinephrine

## 2023-09-19 ENCOUNTER — INFUSION (OUTPATIENT)
Dept: INFUSION THERAPY | Facility: HOSPITAL | Age: 68
End: 2023-09-19
Attending: INTERNAL MEDICINE
Payer: MEDICARE

## 2023-09-19 ENCOUNTER — OFFICE VISIT (OUTPATIENT)
Dept: PRIMARY CARE CLINIC | Facility: CLINIC | Age: 68
End: 2023-09-19
Payer: MEDICARE

## 2023-09-19 VITALS
HEART RATE: 95 BPM | OXYGEN SATURATION: 99 % | SYSTOLIC BLOOD PRESSURE: 134 MMHG | DIASTOLIC BLOOD PRESSURE: 89 MMHG | TEMPERATURE: 99 F | RESPIRATION RATE: 18 BRPM

## 2023-09-19 VITALS
OXYGEN SATURATION: 98 % | DIASTOLIC BLOOD PRESSURE: 72 MMHG | TEMPERATURE: 99 F | SYSTOLIC BLOOD PRESSURE: 132 MMHG | WEIGHT: 160.69 LBS | HEART RATE: 86 BPM | BODY MASS INDEX: 29.39 KG/M2

## 2023-09-19 DIAGNOSIS — D84.9 IMMUNOCOMPROMISED PATIENT: Primary | ICD-10-CM

## 2023-09-19 DIAGNOSIS — D63.1 ANEMIA ASSOCIATED WITH STAGE 4 CHRONIC RENAL FAILURE: Primary | ICD-10-CM

## 2023-09-19 DIAGNOSIS — K11.21 PAROTITIS, ACUTE: ICD-10-CM

## 2023-09-19 DIAGNOSIS — M81.8 OTHER OSTEOPOROSIS WITHOUT CURRENT PATHOLOGICAL FRACTURE: ICD-10-CM

## 2023-09-19 DIAGNOSIS — G47.00 INSOMNIA, UNSPECIFIED TYPE: ICD-10-CM

## 2023-09-19 DIAGNOSIS — N18.4 ANEMIA ASSOCIATED WITH STAGE 4 CHRONIC RENAL FAILURE: Primary | ICD-10-CM

## 2023-09-19 DIAGNOSIS — Z94.1 HEART TRANSPLANTED: Chronic | ICD-10-CM

## 2023-09-19 PROCEDURE — 3078F DIAST BP <80 MM HG: CPT | Mod: HCNC,CPTII,S$GLB, | Performed by: NURSE PRACTITIONER

## 2023-09-19 PROCEDURE — 99999 PR PBB SHADOW E&M-EST. PATIENT-LVL IV: ICD-10-PCS | Mod: PBBFAC,HCNC,, | Performed by: NURSE PRACTITIONER

## 2023-09-19 PROCEDURE — 3066F PR DOCUMENTATION OF TREATMENT FOR NEPHROPATHY: ICD-10-PCS | Mod: HCNC,CPTII,S$GLB, | Performed by: NURSE PRACTITIONER

## 2023-09-19 PROCEDURE — 3008F BODY MASS INDEX DOCD: CPT | Mod: HCNC,CPTII,S$GLB, | Performed by: NURSE PRACTITIONER

## 2023-09-19 PROCEDURE — 96372 THER/PROPH/DIAG INJ SC/IM: CPT | Mod: HCNC

## 2023-09-19 PROCEDURE — 3288F PR FALLS RISK ASSESSMENT DOCUMENTED: ICD-10-PCS | Mod: HCNC,CPTII,S$GLB, | Performed by: NURSE PRACTITIONER

## 2023-09-19 PROCEDURE — 1160F PR REVIEW ALL MEDS BY PRESCRIBER/CLIN PHARMACIST DOCUMENTED: ICD-10-PCS | Mod: HCNC,CPTII,S$GLB, | Performed by: NURSE PRACTITIONER

## 2023-09-19 PROCEDURE — 1101F PR PT FALLS ASSESS DOC 0-1 FALLS W/OUT INJ PAST YR: ICD-10-PCS | Mod: HCNC,CPTII,S$GLB, | Performed by: NURSE PRACTITIONER

## 2023-09-19 PROCEDURE — 99999 PR PBB SHADOW E&M-EST. PATIENT-LVL IV: CPT | Mod: PBBFAC,HCNC,, | Performed by: NURSE PRACTITIONER

## 2023-09-19 PROCEDURE — 3075F PR MOST RECENT SYSTOLIC BLOOD PRESS GE 130-139MM HG: ICD-10-PCS | Mod: HCNC,CPTII,S$GLB, | Performed by: NURSE PRACTITIONER

## 2023-09-19 PROCEDURE — 1125F AMNT PAIN NOTED PAIN PRSNT: CPT | Mod: HCNC,CPTII,S$GLB, | Performed by: NURSE PRACTITIONER

## 2023-09-19 PROCEDURE — 3075F SYST BP GE 130 - 139MM HG: CPT | Mod: HCNC,CPTII,S$GLB, | Performed by: NURSE PRACTITIONER

## 2023-09-19 PROCEDURE — 1157F PR ADVANCE CARE PLAN OR EQUIV PRESENT IN MEDICAL RECORD: ICD-10-PCS | Mod: HCNC,CPTII,S$GLB, | Performed by: NURSE PRACTITIONER

## 2023-09-19 PROCEDURE — 1157F ADVNC CARE PLAN IN RCRD: CPT | Mod: HCNC,CPTII,S$GLB, | Performed by: NURSE PRACTITIONER

## 2023-09-19 PROCEDURE — 63600175 PHARM REV CODE 636 W HCPCS: Mod: JZ,EC,JG,HCNC

## 2023-09-19 PROCEDURE — 3078F PR MOST RECENT DIASTOLIC BLOOD PRESSURE < 80 MM HG: ICD-10-PCS | Mod: HCNC,CPTII,S$GLB, | Performed by: NURSE PRACTITIONER

## 2023-09-19 PROCEDURE — 3066F NEPHROPATHY DOC TX: CPT | Mod: HCNC,CPTII,S$GLB, | Performed by: NURSE PRACTITIONER

## 2023-09-19 PROCEDURE — 3044F PR MOST RECENT HEMOGLOBIN A1C LEVEL <7.0%: ICD-10-PCS | Mod: HCNC,CPTII,S$GLB, | Performed by: NURSE PRACTITIONER

## 2023-09-19 PROCEDURE — 1101F PT FALLS ASSESS-DOCD LE1/YR: CPT | Mod: HCNC,CPTII,S$GLB, | Performed by: NURSE PRACTITIONER

## 2023-09-19 PROCEDURE — 99215 OFFICE O/P EST HI 40 MIN: CPT | Mod: HCNC,S$GLB,, | Performed by: NURSE PRACTITIONER

## 2023-09-19 PROCEDURE — 3044F HG A1C LEVEL LT 7.0%: CPT | Mod: HCNC,CPTII,S$GLB, | Performed by: NURSE PRACTITIONER

## 2023-09-19 PROCEDURE — 1125F PR PAIN SEVERITY QUANTIFIED, PAIN PRESENT: ICD-10-PCS | Mod: HCNC,CPTII,S$GLB, | Performed by: NURSE PRACTITIONER

## 2023-09-19 PROCEDURE — 1159F MED LIST DOCD IN RCRD: CPT | Mod: HCNC,CPTII,S$GLB, | Performed by: NURSE PRACTITIONER

## 2023-09-19 PROCEDURE — 99215 PR OFFICE/OUTPT VISIT, EST, LEVL V, 40-54 MIN: ICD-10-PCS | Mod: HCNC,S$GLB,, | Performed by: NURSE PRACTITIONER

## 2023-09-19 PROCEDURE — 1159F PR MEDICATION LIST DOCUMENTED IN MEDICAL RECORD: ICD-10-PCS | Mod: HCNC,CPTII,S$GLB, | Performed by: NURSE PRACTITIONER

## 2023-09-19 PROCEDURE — 1160F RVW MEDS BY RX/DR IN RCRD: CPT | Mod: HCNC,CPTII,S$GLB, | Performed by: NURSE PRACTITIONER

## 2023-09-19 PROCEDURE — 3288F FALL RISK ASSESSMENT DOCD: CPT | Mod: HCNC,CPTII,S$GLB, | Performed by: NURSE PRACTITIONER

## 2023-09-19 PROCEDURE — 3008F PR BODY MASS INDEX (BMI) DOCUMENTED: ICD-10-PCS | Mod: HCNC,CPTII,S$GLB, | Performed by: NURSE PRACTITIONER

## 2023-09-19 RX ORDER — LINEZOLID 600 MG/1
600 TABLET, FILM COATED ORAL EVERY 12 HOURS
Qty: 20 TABLET | Refills: 0 | Status: SHIPPED | OUTPATIENT
Start: 2023-09-19 | End: 2023-09-22

## 2023-09-19 RX ORDER — TEMAZEPAM 30 MG/1
CAPSULE ORAL
Qty: 30 CAPSULE | Refills: 5 | Status: SHIPPED | OUTPATIENT
Start: 2023-09-19 | End: 2023-11-01

## 2023-09-19 RX ORDER — METRONIDAZOLE 500 MG/1
500 TABLET ORAL 3 TIMES DAILY
Qty: 30 TABLET | Refills: 0 | Status: SHIPPED | OUTPATIENT
Start: 2023-09-19 | End: 2023-09-29

## 2023-09-19 RX ORDER — HYDROCODONE BITARTRATE AND ACETAMINOPHEN 5; 325 MG/1; MG/1
1 TABLET ORAL EVERY 6 HOURS PRN
Qty: 20 TABLET | Refills: 0 | Status: SHIPPED | OUTPATIENT
Start: 2023-09-19 | End: 2023-09-24

## 2023-09-19 RX ADMIN — EPOETIN ALFA-EPBX 20000 UNITS: 20000 INJECTION, SOLUTION INTRAVENOUS; SUBCUTANEOUS at 01:09

## 2023-09-19 NOTE — ASSESSMENT & PLAN NOTE
In immunocompromised pt.   WBC and diff at baseline today.  Zyvox 600 mg PO BID + Flagyl 500 mg q 8 hours PO 10 days total.   Recheck in 3 days. If no improvement then imaging.

## 2023-09-19 NOTE — PROGRESS NOTES
Nadia Damon  09/19/2023  [id number]    Elis Wick MD  Patient Care Team:  Elis Wick MD as PCP - General (Internal Medicine)  Miguel Soni Jr., MD as Consulting Physician (Vascular Surgery)  Ike King MD as Consulting Physician (Cardiology)  Courtney Tubbs MD as Consulting Physician (Cardiology)  Carter Crawford MD as Consulting Physician (Nephrology)  Paxton Vasques OD as Consulting Physician (Optometry)  PRANAY Villalobos MD as Obstetrician (Obstetrics)  Parker Mccarthy IV, MD (Urology)  Ike King MD as Consulting Physician (Cardiology)  Jose Roland MD as Consulting Physician (Dermatology)  Prasanth Johnson MD as Consulting Physician (Rheumatology)  Nora Morin LCSW as   Elis Wick MD as Physician (Internal Medicine)  Lexi Bianchi LCSW as  (Hematology and Oncology)  Candace Saleh LMSW as  (Hematology and Oncology)      [facility] Note      Chief Complaint:  Chief Complaint   Patient presents with    Follow-up     Pt feels like she has swollen lymp node in neck        History of Present Illness:  HPI    Pain to left side of jaw and neck. Swelling also. Onset last week. Some relief with APAP. Painful swallowing. No difficulty swallowing and no dyspnea. No stridor. Saw dentist this AM. No dental problems. Recommended PCP visit.    Episode of burning substernal CP Saturday evening. Relieved by resting 15 minutes. Followed by Dr Ike King.    Still has urinary incontinence. Considering pelvic floor training.       Review of Systems   Constitutional:  Positive for chills. Negative for appetite change, diaphoresis and fever.   HENT:  Positive for facial swelling. Negative for congestion, dental problem, drooling, ear pain, mouth sores, sinus pressure, sore throat and trouble swallowing.    Respiratory:  Negative for shortness of breath.    Cardiovascular:  Positive for chest pain and  palpitations. Negative for leg swelling.   Gastrointestinal:  Negative for abdominal pain, constipation, diarrhea and nausea.   Genitourinary:  Negative for difficulty urinating and dysuria.   Neurological:  Negative for dizziness.         The following were reviewed: Active problem list, medication list, allergies, family history, social history, and Health Maintenance.       Medications:  Current Outpatient Medications on File Prior to Visit   Medication Sig Dispense Refill    biotin 10,000 mcg Cap Take 1 tablet by mouth once daily.      calcitonin, salmon, (FORTICAL) 200 unit/actuation nasal spray 1 spray by Nasal route once daily. 3 each 3    carvediloL (COREG) 6.25 MG tablet Take 1 tablet (6.25 mg total) by mouth 2 (two) times daily with meals. 180 tablet 3    cycloSPORINE modified, NEORAL, (NEORAL) 25 MG capsule Take 3 capsules (75 mg total) by mouth 2 (two) times daily. 540 capsule 0    EVENING PRIMROSE OIL ORAL Take 1,000 mg by mouth once daily.      furosemide (LASIX) 20 MG tablet Take 1 tablet (20 mg total) by mouth once daily. 90 tablet 1    hydrALAZINE (APRESOLINE) 50 MG tablet Take 1 tablet (50 mg total) by mouth every 8 (eight) hours. TAKE 1 TABLETS  EVERY 8  HOURS. 270 tablet 3    multivitamin capsule Take 1 capsule by mouth once daily.      NIFEdipine (PROCARDIA-XL) 30 MG (OSM) 24 hr tablet Take 1 tablet (30 mg total) by mouth once daily. 30 tablet 11    pantoprazole (PROTONIX) 20 MG tablet Take 1 tablet (20 mg total) by mouth once daily. 30 tablet 11    polyethylene glycol (GLYCOLAX) 17 gram/dose powder Take 17 g by mouth once daily.      pregabalin (LYRICA) 50 MG capsule Take 1 capsule (50 mg total) by mouth 2 (two) times daily. 180 capsule 0    psyllium husk (METAMUCIL ORAL) Take by mouth.      vitamin E 400 UNIT capsule Take 400 Units by mouth once daily.      zolpidem (AMBIEN) 10 mg Tab TAKE 0.5 to 1 TABLET at bedtime as needed for insomnia 30 tablet 0    [DISCONTINUED] temazepam (RESTORIL) 30  mg capsule TAKE 1 CAPSULE AT BEDTIME 30 capsule 5    guaiFENesin (MUCINEX) 600 mg 12 hr tablet Take 1,200 mg by mouth as needed.      UNABLE TO FIND medication name: Stool softener (Ducolax) Laxative      [DISCONTINUED] HYDROcodone-acetaminophen (NORCO) 5-325 mg per tablet Take 1 tablet by mouth every 4 (four) hours as needed for Pain. (Patient not taking: Reported on 9/19/2023) 8 tablet 0     Current Facility-Administered Medications on File Prior to Visit   Medication Dose Route Frequency Provider Last Rate Last Admin    acetaminophen tablet 1,000 mg  1,000 mg Oral On Call Procedure PRANAY Villalobos MD        [COMPLETED] epoetin tristan-epbx injection 20,000 Units  20,000 Units Subcutaneous 1 time in Clinic/HOD Kelsie Burk PA-C   20,000 Units at 09/19/23 1324    famotidine tablet 20 mg  20 mg Oral On Call Procedure PRANAY Villalobos MD           Medications have been reviewed and reconciled with patient at visit today.    Barriers to medications present (no )    Fall since last office visit (no )      Exam:  Vitals:    09/19/23 1457   BP: 132/72   Pulse: 86   Temp: 98.9 °F (37.2 °C)     Weight: 72.9 kg (160 lb 11.2 oz)   Body mass index is 29.39 kg/m².      BP Readings from Last 3 Encounters:   09/19/23 132/72   09/19/23 134/89   09/06/23 (P) 100/62     Wt Readings from Last 3 Encounters:   09/19/23 1457 72.9 kg (160 lb 11.2 oz)   09/06/23 1332 72.9 kg (160 lb 11.5 oz)   08/25/23 1300 72.1 kg (159 lb)            Physical Exam  Constitutional:       General: She is not in acute distress.     Appearance: She is ill-appearing (appears uncomfortable).   HENT:      Head: Normocephalic.      Right Ear: Tympanic membrane normal.      Left Ear: Tympanic membrane normal.      Nose: Nose normal. No congestion or rhinorrhea.      Mouth/Throat:      Mouth: Mucous membranes are moist.      Pharynx: No oropharyngeal exudate or posterior oropharyngeal erythema.      Comments: No salivary stones or purulence noted.      Neck:        Comments: Moderate swelling, warmth, painful. No purulence.  Cardiovascular:      Rate and Rhythm: Normal rate and regular rhythm.      Heart sounds: Normal heart sounds.   Pulmonary:      Effort: Pulmonary effort is normal.      Breath sounds: Normal breath sounds. No stridor.   Abdominal:      Palpations: Abdomen is soft.   Musculoskeletal:         General: Swelling present.      Cervical back: Tenderness present.   Skin:     General: Skin is warm and dry.   Neurological:      Mental Status: She is alert. Mental status is at baseline.   Psychiatric:         Mood and Affect: Mood normal.         Behavior: Behavior normal.         Laboratory Reviewed: (Yes)  Lab Results   Component Value Date    WBC 4.14 09/19/2023    HGB 8.9 (L) 09/19/2023    HCT 29.0 (L) 09/19/2023     09/19/2023    CHOL 169 06/20/2023    TRIG 176 (H) 09/19/2023    HDL 44 06/20/2023    ALT 15 09/19/2023    AST 28 09/19/2023     09/19/2023    K 5.1 09/19/2023     09/19/2023    CREATININE 2.8 (H) 09/19/2023    BUN 45 (H) 09/19/2023    CO2 22 (L) 09/19/2023    TSH 8.358 (H) 06/20/2023    PSA <0.1 05/27/2008    INR 1.0 11/22/2021    HGBA1C 5.1 03/08/2023           Health Maintenance  Health Maintenance Topics with due status: Not Due       Topic Last Completion Date    Pneumococcal Vaccines (Age 65+) 10/22/2018    TETANUS VACCINE 09/09/2020    Colorectal Cancer Screening 02/25/2021    DEXA Scan 01/25/2023    Hemoglobin A1c (Diabetic Prevention Screening) 03/08/2023    Mammogram 04/10/2023    Lipid Panel 09/19/2023     Health Maintenance Due   Topic Date Due    COVID-19 Vaccine (4 - Moderna risk series) 06/22/2023    Influenza Vaccine (1) 09/01/2023           Assessment:  Problem List Items Addressed This Visit          ENT    Parotitis, acute     In immunocompromised pt.   WBC and diff at baseline today.  Zyvox 600 mg PO BID + Flagyl 500 mg q 8 hours PO 10 days total.   Recheck in 3 days. If no improvement then  imaging.         Relevant Medications    linezolid (ZYVOX) 600 mg Tab    metroNIDAZOLE (FLAGYL) 500 MG tablet    HYDROcodone-acetaminophen (NORCO) 5-325 mg per tablet       Cardiac/Vascular    Heart transplanted (Chronic)     F/U with cardiology ASAP due to episode of CP.  If it reoccurs contact EMS.            Immunology/Multi System    Immunocompromised patient - Primary    Relevant Medications    linezolid (ZYVOX) 600 mg Tab    metroNIDAZOLE (FLAGYL) 500 MG tablet    HYDROcodone-acetaminophen (NORCO) 5-325 mg per tablet     Other Visit Diagnoses       Insomnia, unspecified type        Relevant Medications    temazepam (RESTORIL) 30 mg capsule              Plan:  Immunocompromised patient  -     linezolid (ZYVOX) 600 mg Tab; Take 1 tablet (600 mg total) by mouth every 12 (twelve) hours. for 10 days  Dispense: 20 tablet; Refill: 0  -     metroNIDAZOLE (FLAGYL) 500 MG tablet; Take 1 tablet (500 mg total) by mouth 3 (three) times daily. for 10 days  Dispense: 30 tablet; Refill: 0  -     HYDROcodone-acetaminophen (NORCO) 5-325 mg per tablet; Take 1 tablet by mouth every 6 (six) hours as needed for Pain.  Dispense: 20 tablet; Refill: 0    Parotitis, acute  -     linezolid (ZYVOX) 600 mg Tab; Take 1 tablet (600 mg total) by mouth every 12 (twelve) hours. for 10 days  Dispense: 20 tablet; Refill: 0  -     metroNIDAZOLE (FLAGYL) 500 MG tablet; Take 1 tablet (500 mg total) by mouth 3 (three) times daily. for 10 days  Dispense: 30 tablet; Refill: 0  -     HYDROcodone-acetaminophen (NORCO) 5-325 mg per tablet; Take 1 tablet by mouth every 6 (six) hours as needed for Pain.  Dispense: 20 tablet; Refill: 0    Insomnia, unspecified type  -     temazepam (RESTORIL) 30 mg capsule; TAKE 1 CAPSULE AT BEDTIME  Dispense: 30 capsule; Refill: 5    Heart transplanted      -Patient's lab results were reviewed and discussed with patient  -Treatment options and alternatives were discussed with the patient. Patient expressed understanding.  Patient was given the opportunity to ask questions and be an active participant in their medical care. Patient had no further questions or concerns at this time.   -Documentation of patient's health and condition was obtained from family member who was present during visit.  -Patient is an overall moderate risk for health complications from their medical conditions.       Follow up: Follow up in about 3 days (around 9/22/2023).      After visit summary printed and given to patient upon discharge.  Patient goals and care plan are included in After visit summary.    Total medical decision making time was 56 min.  The following issues were discussed: The primary encounter diagnosis was Immunocompromised patient. Diagnoses of Parotitis, acute, Insomnia, unspecified type, and Heart transplanted were also pertinent to this visit.    Health maintenance needs, recent test results and goals of care discussed with pt and questions answered.

## 2023-09-20 ENCOUNTER — TELEPHONE (OUTPATIENT)
Dept: TRANSPLANT | Facility: CLINIC | Age: 68
End: 2023-09-20
Payer: MEDICARE

## 2023-09-20 NOTE — TELEPHONE ENCOUNTER
Confirmed with pt she took her CYA prior to her lab draw, will await until she feels better to do repeat labs.

## 2023-09-22 ENCOUNTER — OFFICE VISIT (OUTPATIENT)
Dept: PRIMARY CARE CLINIC | Facility: CLINIC | Age: 68
End: 2023-09-22
Payer: MEDICARE

## 2023-09-22 VITALS
WEIGHT: 159.5 LBS | HEART RATE: 96 BPM | TEMPERATURE: 99 F | DIASTOLIC BLOOD PRESSURE: 78 MMHG | OXYGEN SATURATION: 98 % | HEIGHT: 62 IN | BODY MASS INDEX: 29.35 KG/M2 | SYSTOLIC BLOOD PRESSURE: 135 MMHG

## 2023-09-22 DIAGNOSIS — K11.21 PAROTITIS, ACUTE: ICD-10-CM

## 2023-09-22 PROCEDURE — 3075F PR MOST RECENT SYSTOLIC BLOOD PRESS GE 130-139MM HG: ICD-10-PCS | Mod: HCNC,CPTII,S$GLB, | Performed by: NURSE PRACTITIONER

## 2023-09-22 PROCEDURE — 3288F FALL RISK ASSESSMENT DOCD: CPT | Mod: HCNC,CPTII,S$GLB, | Performed by: NURSE PRACTITIONER

## 2023-09-22 PROCEDURE — 3044F PR MOST RECENT HEMOGLOBIN A1C LEVEL <7.0%: ICD-10-PCS | Mod: HCNC,CPTII,S$GLB, | Performed by: NURSE PRACTITIONER

## 2023-09-22 PROCEDURE — 3288F PR FALLS RISK ASSESSMENT DOCUMENTED: ICD-10-PCS | Mod: HCNC,CPTII,S$GLB, | Performed by: NURSE PRACTITIONER

## 2023-09-22 PROCEDURE — 99213 PR OFFICE/OUTPT VISIT, EST, LEVL III, 20-29 MIN: ICD-10-PCS | Mod: HCNC,S$GLB,, | Performed by: NURSE PRACTITIONER

## 2023-09-22 PROCEDURE — 3066F NEPHROPATHY DOC TX: CPT | Mod: HCNC,CPTII,S$GLB, | Performed by: NURSE PRACTITIONER

## 2023-09-22 PROCEDURE — 99999 PR PBB SHADOW E&M-EST. PATIENT-LVL V: CPT | Mod: PBBFAC,HCNC,, | Performed by: NURSE PRACTITIONER

## 2023-09-22 PROCEDURE — 3008F BODY MASS INDEX DOCD: CPT | Mod: HCNC,CPTII,S$GLB, | Performed by: NURSE PRACTITIONER

## 2023-09-22 PROCEDURE — 3044F HG A1C LEVEL LT 7.0%: CPT | Mod: HCNC,CPTII,S$GLB, | Performed by: NURSE PRACTITIONER

## 2023-09-22 PROCEDURE — 1101F PR PT FALLS ASSESS DOC 0-1 FALLS W/OUT INJ PAST YR: ICD-10-PCS | Mod: HCNC,CPTII,S$GLB, | Performed by: NURSE PRACTITIONER

## 2023-09-22 PROCEDURE — 3008F PR BODY MASS INDEX (BMI) DOCUMENTED: ICD-10-PCS | Mod: HCNC,CPTII,S$GLB, | Performed by: NURSE PRACTITIONER

## 2023-09-22 PROCEDURE — 3066F PR DOCUMENTATION OF TREATMENT FOR NEPHROPATHY: ICD-10-PCS | Mod: HCNC,CPTII,S$GLB, | Performed by: NURSE PRACTITIONER

## 2023-09-22 PROCEDURE — 1101F PT FALLS ASSESS-DOCD LE1/YR: CPT | Mod: HCNC,CPTII,S$GLB, | Performed by: NURSE PRACTITIONER

## 2023-09-22 PROCEDURE — 1126F PR PAIN SEVERITY QUANTIFIED, NO PAIN PRESENT: ICD-10-PCS | Mod: HCNC,CPTII,S$GLB, | Performed by: NURSE PRACTITIONER

## 2023-09-22 PROCEDURE — 3078F PR MOST RECENT DIASTOLIC BLOOD PRESSURE < 80 MM HG: ICD-10-PCS | Mod: HCNC,CPTII,S$GLB, | Performed by: NURSE PRACTITIONER

## 2023-09-22 PROCEDURE — 3075F SYST BP GE 130 - 139MM HG: CPT | Mod: HCNC,CPTII,S$GLB, | Performed by: NURSE PRACTITIONER

## 2023-09-22 PROCEDURE — 1126F AMNT PAIN NOTED NONE PRSNT: CPT | Mod: HCNC,CPTII,S$GLB, | Performed by: NURSE PRACTITIONER

## 2023-09-22 PROCEDURE — 99999 PR PBB SHADOW E&M-EST. PATIENT-LVL V: ICD-10-PCS | Mod: PBBFAC,HCNC,, | Performed by: NURSE PRACTITIONER

## 2023-09-22 PROCEDURE — 1159F PR MEDICATION LIST DOCUMENTED IN MEDICAL RECORD: ICD-10-PCS | Mod: HCNC,CPTII,S$GLB, | Performed by: NURSE PRACTITIONER

## 2023-09-22 PROCEDURE — 1159F MED LIST DOCD IN RCRD: CPT | Mod: HCNC,CPTII,S$GLB, | Performed by: NURSE PRACTITIONER

## 2023-09-22 PROCEDURE — 99213 OFFICE O/P EST LOW 20 MIN: CPT | Mod: HCNC,S$GLB,, | Performed by: NURSE PRACTITIONER

## 2023-09-22 PROCEDURE — 3078F DIAST BP <80 MM HG: CPT | Mod: HCNC,CPTII,S$GLB, | Performed by: NURSE PRACTITIONER

## 2023-09-22 PROCEDURE — 1157F PR ADVANCE CARE PLAN OR EQUIV PRESENT IN MEDICAL RECORD: ICD-10-PCS | Mod: HCNC,CPTII,S$GLB, | Performed by: NURSE PRACTITIONER

## 2023-09-22 PROCEDURE — 1157F ADVNC CARE PLAN IN RCRD: CPT | Mod: HCNC,CPTII,S$GLB, | Performed by: NURSE PRACTITIONER

## 2023-09-22 NOTE — PROGRESS NOTES
Nadia Damon  09/22/2023  [id number]    Elis Wick MD  Patient Care Team:  Elis Wick MD as PCP - General (Internal Medicine)  Miguel Soni Jr., MD as Consulting Physician (Vascular Surgery)  Ike King MD as Consulting Physician (Cardiology)  Courtney Tubbs MD as Consulting Physician (Cardiology)  Carter Crawford MD as Consulting Physician (Nephrology)  Paxton Vasques OD as Consulting Physician (Optometry)  PRANAY Villalobos MD as Obstetrician (Obstetrics)  Parker Mccarthy IV, MD (Urology)  Ike King MD as Consulting Physician (Cardiology)  Jose Roland MD as Consulting Physician (Dermatology)  Prasanth Johnson MD as Consulting Physician (Rheumatology)  Nora Morin LCSW as   Elis Wick MD as Physician (Internal Medicine)  Lexi Bianchi LCSW as  (Hematology and Oncology)  Candace Saleh LMSW as  (Hematology and Oncology)      [facility] Note      Chief Complaint:  Chief Complaint   Patient presents with    Establish Care    Follow-up     3 day f/u    Medication Reaction     Linezolid 600mg Pt. Had nausea and vomiting       History of Present Illness:  HPI    3 day follow up. Prescribed tx for parotitis 9/19/23 - Zyvox plus Flagyl.   Recommended to see Dr King after episode of CP. Has appt with Dr King 10/24/23.    Feels much better today. Much less pain and swelling to jaw.   She was only able to tolerate single dose of Zyvox - caused significant n/v.   Tolerates Flagyl, some diarrhea. No fever past 2 days.     Tacromilus level elevated; she had taken meds prior to lab.         Review of Systems   Constitutional:  Negative for chills, fatigue and fever.   Respiratory:  Negative for cough, chest tightness and shortness of breath.    Cardiovascular:  Negative for leg swelling.   Gastrointestinal:  Positive for diarrhea. Negative for abdominal pain and constipation.   Genitourinary:  Negative for  dysuria.         The following were reviewed: Active problem list, medication list, allergies, family history, social history, and Health Maintenance.       Medications:  Current Outpatient Medications on File Prior to Visit   Medication Sig Dispense Refill    biotin 10,000 mcg Cap Take 1 tablet by mouth once daily.      calcitonin, salmon, (FORTICAL) 200 unit/actuation nasal spray 1 spray by Nasal route once daily. 3 each 3    cycloSPORINE modified, NEORAL, (NEORAL) 25 MG capsule Take 3 capsules (75 mg total) by mouth 2 (two) times daily. 540 capsule 0    EVENING PRIMROSE OIL ORAL Take 1,000 mg by mouth once daily.      furosemide (LASIX) 20 MG tablet Take 1 tablet (20 mg total) by mouth once daily. 90 tablet 1    guaiFENesin (MUCINEX) 600 mg 12 hr tablet Take 1,200 mg by mouth as needed.      hydrALAZINE (APRESOLINE) 50 MG tablet Take 1 tablet (50 mg total) by mouth every 8 (eight) hours. TAKE 1 TABLETS  EVERY 8  HOURS. 270 tablet 3    HYDROcodone-acetaminophen (NORCO) 5-325 mg per tablet Take 1 tablet by mouth every 6 (six) hours as needed for Pain. 20 tablet 0    metroNIDAZOLE (FLAGYL) 500 MG tablet Take 1 tablet (500 mg total) by mouth 3 (three) times daily. for 10 days 30 tablet 0    multivitamin capsule Take 1 capsule by mouth once daily.      NIFEdipine (PROCARDIA-XL) 30 MG (OSM) 24 hr tablet Take 1 tablet (30 mg total) by mouth once daily. 30 tablet 11    pantoprazole (PROTONIX) 20 MG tablet Take 1 tablet (20 mg total) by mouth once daily. 30 tablet 11    polyethylene glycol (GLYCOLAX) 17 gram/dose powder Take 17 g by mouth once daily.      pregabalin (LYRICA) 50 MG capsule Take 1 capsule (50 mg total) by mouth 2 (two) times daily. 180 capsule 0    psyllium husk (METAMUCIL ORAL) Take by mouth.      temazepam (RESTORIL) 30 mg capsule TAKE 1 CAPSULE AT BEDTIME 30 capsule 5    UNABLE TO FIND medication name: Stool softener (Ducolax) Laxative      vitamin E 400 UNIT capsule Take 400 Units by mouth once daily.       zolpidem (AMBIEN) 10 mg Tab TAKE 0.5 to 1 TABLET at bedtime as needed for insomnia 30 tablet 0    [DISCONTINUED] linezolid (ZYVOX) 600 mg Tab Take 1 tablet (600 mg total) by mouth every 12 (twelve) hours. for 10 days 20 tablet 0    carvediloL (COREG) 6.25 MG tablet Take 1 tablet (6.25 mg total) by mouth 2 (two) times daily with meals. 180 tablet 3     Current Facility-Administered Medications on File Prior to Visit   Medication Dose Route Frequency Provider Last Rate Last Admin    acetaminophen tablet 1,000 mg  1,000 mg Oral On Call Procedure PRANAY Villalobos MD        famotidine tablet 20 mg  20 mg Oral On Call Procedure PRANAY Villalobos MD           Medications have been reviewed and reconciled with patient at visit today.    Barriers to medications present (no )    Fall since last office visit (no )      Exam:  Vitals:    09/22/23 1508   BP: 135/78   Pulse: 96   Temp: 99.1 °F (37.3 °C)     Weight: 72.3 kg (159 lb 8 oz)   Body mass index is 29.17 kg/m².      BP Readings from Last 3 Encounters:   09/22/23 135/78   09/19/23 132/72   09/19/23 134/89     Wt Readings from Last 3 Encounters:   09/22/23 1508 72.3 kg (159 lb 8 oz)   09/19/23 1457 72.9 kg (160 lb 11.2 oz)   09/06/23 1332 72.9 kg (160 lb 11.5 oz)            Physical Exam  Constitutional:       General: She is not in acute distress.     Appearance: She is not ill-appearing.   HENT:      Head: Normocephalic.      Left Ear: Tympanic membrane, ear canal and external ear normal.      Nose: Nose normal.      Mouth/Throat:      Mouth: Mucous membranes are moist.      Pharynx: No oropharyngeal exudate or posterior oropharyngeal erythema.   Eyes:      General: No scleral icterus.  Cardiovascular:      Rate and Rhythm: Normal rate and regular rhythm.   Pulmonary:      Effort: Pulmonary effort is normal. No respiratory distress.      Breath sounds: Normal breath sounds.   Abdominal:      General: There is no distension.      Palpations: Abdomen is soft.       Tenderness: There is no abdominal tenderness.   Musculoskeletal:      Cervical back: Normal range of motion and neck supple. No rigidity or tenderness.   Lymphadenopathy:      Cervical: No cervical adenopathy.   Skin:     General: Skin is warm and dry.   Neurological:      Mental Status: She is alert. Mental status is at baseline.      Gait: Gait normal.   Psychiatric:         Mood and Affect: Mood normal.         Behavior: Behavior normal.         Thought Content: Thought content normal.         Laboratory Reviewed: (Yes)  Lab Results   Component Value Date    WBC 4.14 09/19/2023    HGB 8.9 (L) 09/19/2023    HCT 29.0 (L) 09/19/2023     09/19/2023    CHOL 156 09/19/2023    TRIG 176 (H) 09/19/2023    HDL 34 (L) 09/19/2023    ALT 15 09/19/2023    AST 28 09/19/2023     09/19/2023    K 5.1 09/19/2023     09/19/2023    CREATININE 2.8 (H) 09/19/2023    BUN 45 (H) 09/19/2023    CO2 22 (L) 09/19/2023    TSH 8.358 (H) 06/20/2023    PSA <0.1 05/27/2008    INR 1.0 11/22/2021    HGBA1C 5.1 03/08/2023           Health Maintenance  Health Maintenance Topics with due status: Not Due       Topic Last Completion Date    TETANUS VACCINE 09/09/2020    Colorectal Cancer Screening 02/25/2021    DEXA Scan 01/25/2023    Hemoglobin A1c (Diabetic Prevention Screening) 03/08/2023    Mammogram 04/10/2023    Lipid Panel 09/19/2023     Health Maintenance Due   Topic Date Due    COVID-19 Vaccine (4 - Moderna risk series) 06/22/2023    Influenza Vaccine (1) 09/01/2023    Pneumococcal Vaccines (Age 65+) (3 - PPSV23 or PCV20) 10/22/2023         Assessment:  Problem List Items Addressed This Visit          ENT    Parotitis, acute     No further swelling/tenderness. Continues Flagyl.               Plan:  Parotitis, acute      -Patient's lab results were reviewed and discussed with patient  -Treatment options and alternatives were discussed with the patient. Patient expressed understanding. Patient was given the opportunity to ask  questions and be an active participant in their medical care. Patient had no further questions or concerns at this time.   -Documentation of patient's health and condition was obtained from family member who was present during visit.  -Patient is an overall moderate risk for health complications from their medical conditions.       Follow up: Follow up in about 6 weeks (around 11/3/2023).      After visit summary printed and given to patient upon discharge.  Patient goals and care plan are included in After visit summary.    Total medical decision making time was 26 min.  The following issues were discussed: The encounter diagnosis was Parotitis, acute.    Health maintenance needs, recent test results and goals of care discussed with pt and questions answered.

## 2023-09-26 ENCOUNTER — TELEPHONE (OUTPATIENT)
Dept: TRANSPLANT | Facility: CLINIC | Age: 68
End: 2023-09-26
Payer: MEDICARE

## 2023-09-26 ENCOUNTER — TELEPHONE (OUTPATIENT)
Dept: HEMATOLOGY/ONCOLOGY | Facility: CLINIC | Age: 68
End: 2023-09-26
Payer: MEDICARE

## 2023-09-26 NOTE — TELEPHONE ENCOUNTER
----- Message from Kelsie Burk PA-C sent at 9/26/2023  1:17 PM CDT -----  Regarding: RE: Clearance for Injection  Let reschedule her injection and push her out until the following week,   ----- Message -----  From: Candace Saleh LMSW  Sent: 9/26/2023  10:42 AM CDT  To: Kelsie Burk PA-C; Shannon Stevens MA  Subject: Clearance for Injection                          Good morning y'all,    She called to say she was recently diagnosed with mumps she saw her primary yesterday. She says she is on antibiotics for it and is also experiencing diarrhea. She wanted to see if she would be clear to get her injection coming up on 10/3, can you give her a call back regarding this?

## 2023-09-26 NOTE — TELEPHONE ENCOUNTER
Guilherme received call from pt today reporting she has been recently diagnosed with mumps. She is on antibiotics but is wondering if she would be clear to receive her next injection on 10/3/23. She also has diarrhea. Kathyr to contact provider and have staff return pt call regarding this. Pt. agreeable to this plan. Swer will remain available.

## 2023-09-27 NOTE — PROGRESS NOTES
Patient ID: Nadia Damon is a 68 y.o. female.    Chief Complaint:     HPI: Patient presents for breast cancer survivorship and surveillance    DIAGNOSIS: L breast microinvasive carcinoma, pTmi(m) N1mi(sn) cM0, ER/OK negative, high grade     TREATMENT HISTORY:   1. L lumpectomy 9/10/21  2. L sentinel node biopsy 10/12/21  3. Attempted chemotherapy  4. 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 She was treated with 1 cycle of systemic chemotherapy cytoxan and taxotere and udenyca that was discontinued due to toxicity. She had radiation, completed 4/14/22.   Pt heart transplant patient 30 years.     Pt has CKD and is on epo injections and hematology monitors labs    Pt had mckenzie diagnostic mammo and bilateral ultrasounds of breasts that revealed a complex cyst at 9 oclock- 6 mon f/u was recommended- pt was anxious - ultrasound core biopsy performed 10/31/2022- benign results    Risk factors identified:     Menarche at 14 y/o  G 2 P 2  First pregnancy at 16  LMP: partial hyst - 1984  Estrogen:none  Radiation to the neck or chest wall- none  Prior breast biopsies or atypical hyperplasia- right breast excisional biopsy- benign- left core biopsy benign in past       FH: mother breast cancer at 40's.     Body mass index is 29.35 kg/m².    Interval History:     Persistent symptoms/side effects of treatment:    Functional changes: ROM, neuropathy, weakness or fatigue- fatigue due to anemia- ROM left arm has improved with PT    Psychosocial challenges- situational depression due to health challenges lately    Lymphedema- completed PT in April 2023- going back due to cording- ROM has improved significantly    Diet/Nutrition- stable    Sexual Dysfunction or challenges- denies    Review of Systems   Constitutional: Negative.    HENT: Negative.     Eyes: Negative.    Respiratory: Negative.      Cardiovascular: Negative.    Gastrointestinal: Negative.         No reflux   Endocrine: Negative.    Genitourinary: Negative.    Musculoskeletal:  Positive for back pain (chronic=improving).        Pt relates left shoulder pain and stiffness since completed radiation- completed PT and improved-    Skin: Negative.    Allergic/Immunologic: Negative.    Neurological: Negative.    Hematological: Negative.  Negative for adenopathy.   Psychiatric/Behavioral: Negative.       Breast: Pt denies any breast pain, has left lateral chest wall tenderness after last biopsy, nipple discharge, or palpable mass. No prior trauma or bruising.     Current Outpatient Medications   Medication Sig Dispense Refill    biotin 10,000 mcg Cap Take 1 tablet by mouth once daily.      cycloSPORINE modified, NEORAL, (NEORAL) 25 MG capsule Take 3 capsules (75 mg total) by mouth 2 (two) times daily. 540 capsule 0    EVENING PRIMROSE OIL ORAL Take 1,000 mg by mouth once daily.      furosemide (LASIX) 20 MG tablet Take 1 tablet (20 mg total) by mouth once daily. 90 tablet 1    guaiFENesin (MUCINEX) 600 mg 12 hr tablet Take 1,200 mg by mouth as needed.      hydrALAZINE (APRESOLINE) 50 MG tablet Take 1 tablet (50 mg total) by mouth every 8 (eight) hours. 270 tablet 3    multivitamin capsule Take 1 capsule by mouth once daily.      NIFEdipine (PROCARDIA-XL) 30 MG (OSM) 24 hr tablet Take 1 tablet (30 mg total) by mouth once daily. 30 tablet 11    pantoprazole (PROTONIX) 20 MG tablet Take 1 tablet (20 mg total) by mouth once daily. 30 tablet 11    polyethylene glycol (GLYCOLAX) 17 gram/dose powder Take 17 g by mouth once daily.      pregabalin (LYRICA) 50 MG capsule Take 1 capsule (50 mg total) by mouth 2 (two) times daily. 180 capsule 1    psyllium husk (METAMUCIL ORAL) Take by mouth.      RETACRIT 20,000 unit/mL injection       temazepam (RESTORIL) 30 mg capsule TAKE 1 CAPSULE AT BEDTIME 30 capsule 5    UNABLE TO FIND medication name: Stool softener  (Ducolax) Laxative      vitamin E 400 UNIT capsule Take 400 Units by mouth once daily.      zolpidem (AMBIEN) 10 mg Tab Take 1 tablet (10 mg total) by mouth nightly as needed (insomnia). 90 tablet 0    calcitonin, salmon, (FORTICAL) 200 unit/actuation nasal spray 1 spray by Nasal route once daily. 3 each 3    carvediloL (COREG) 6.25 MG tablet Take 1 tablet (6.25 mg total) by mouth 2 (two) times daily with meals. 180 tablet 3     Current Facility-Administered Medications   Medication Dose Route Frequency Provider Last Rate Last Admin    acetaminophen tablet 1,000 mg  1,000 mg Oral On Call Procedure PRANAY Villalobos MD        famotidine tablet 20 mg  20 mg Oral On Call Procedure PRANAY Villalobos MD           Review of patient's allergies indicates:   Allergen Reactions    Lisinopril Swelling and Rash    Augmentin [amoxicillin-pot clavulanate] Diarrhea    Zyvox [linezolid] Nausea And Vomiting       Past Medical History:   Diagnosis Date    Abdominal wall hernia     CT Renal 6/11/2018---Small fat containing superior ventral abdominal wall hernia at the epicardial pacing lead site.    Abnormal mammogram 10/12/2021    GRACE (acute kidney injury) 11/22/2021    Anxiety     Arthritis     ZEN HIPS    Breast cancer in female 08/2021    LEFT BREAST    C. difficile colitis 11/29/2021    Cellulitis of axilla, left 12/23/2021    Chronic diastolic heart failure 12/16/2021    Chronic kidney disease     stage 4, GFR 15-29 ml/min    Chronic midline low back pain without sciatica 06/18/2018    Closed nondisplaced fracture of distal phalanx of left great toe with routine healing 10/22/2018    Coronary artery disease 1993    heart transplant    Cystitis 05/10/2022    Depression     Encounter for blood transfusion     Fibromyalgia     on Lyrica    Heart failure     native heart cardiomyopathy    Heart transplanted 1993    due to cardiomyopathy    History of hyperparathyroidism; Hyperparathyroidism, secondary renal     PT DENIES     Hypertension     Immune disorder     anti rejection meds    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    Impaired mobility 07/28/2022    Iron deficiency anemia 08/15/2017    Kidney stones     passed per pt    Obesity     Other osteoporosis without current pathological fracture 08/30/2019    Severe sepsis 11/22/2021    Shingles 2003 approx    left leg    Subclinical hypothyroidism 06/16/2023    Thrombocytopenia, unspecified 11/29/2021    Trouble in sleeping     Urinary incontinence        Past Surgical History:   Procedure Laterality Date    BLADDER SURGERY  2015 approx    mesh - Dr Everett then 2nd reconstructive sx Dr Onofre    BREAST BIOPSY Bilateral     NEGATIVE    BREAST BIOPSY Right 10/31/2022    benign    BREAST LUMPECTOMY Left 2021    BREAST SURGERY Left 09/28/2015    Bx - benign    BREAST SURGERY Right 12/2015    Bx benign    CARDIAC PACEMAKER REMOVAL Left 06/26/2014    Pacer defirillator removed. Put in 1993 aat time of heart transplant    CARPAL TUNNEL RELEASE Left 03/03/2015    Dr. Hall    COLONOSCOPY N/A 02/25/2021    Procedure: COLONOSCOPY;  Surgeon: Freida Ramirez MD;  Location: [facility] ENDO;  Service: Endoscopy;  Laterality: N/A;    CYSTOCELE REPAIR      Twice with mesh removal    EPIDURAL STEROID INJECTION INTO CERVICAL SPINE N/A 02/02/2023    Procedure: T11/T12 IL HELLEN;  Surgeon: Jassi Pierre MD;  Location: [facility];  Service: Pain Management;  Laterality: N/A;    HEART TRANSPLANT  1993    HERNIA REPAIR Right 1971 approx    Inguinal    HYSTERECTOMY  1983    vag hyst /LSO     INCISION AND DRAINAGE OF ABSCESS Left 12/24/2021    Procedure: INCISION AND DRAINAGE, ABSCESS;  Surgeon: Joseph Longo MD;  Location: [facility] OR;  Service: General;  Laterality: Left;    INJECTION OF ANESTHETIC AGENT AROUND MEDIAL BRANCH NERVES INNERVATING LUMBAR FACET JOINT Right 10/19/2022    Procedure: Right L4/L5 and L5/S1 MBB;  Surgeon: Jassi Pierre MD;  Location: [facility] PAIN T;  Service:  Pain Management;  Laterality: Right;    INJECTION OF ANESTHETIC AGENT AROUND MEDIAL BRANCH NERVES INNERVATING LUMBAR FACET JOINT Right 11/09/2022    Procedure: Right L4/L5 and L5/S1 MBB;  Surgeon: Jassi Pierre MD;  Location: [facility] PAIN MGT;  Service: Pain Management;  Laterality: Right;    INJECTION OF ANESTHETIC AGENT INTO SACROILIAC JOINT Right 08/22/2022    Procedure: Right SIJ Injection Right L5/S1 Facte Injection;  Surgeon: Jassi Pierre MD;  Location: [facility] PAIN MGT;  Service: Pain Management;  Laterality: Right;    INSERTION OF TUNNELED CENTRAL VENOUS CATHETER (CVC) WITH SUBCUTANEOUS PORT N/A 11/09/2021    Procedure: [id number];  Surgeon: Christoph Douglas MD;  Location: [facility] OR;  Service: General;  Laterality: N/A;    RADIOFREQUENCY THERMOCOAGULATION Right 12/07/2022    Procedure: Right L4/L5 and L5/S1 Lumbar RFA;  Surgeon: Jassi Pierre MD;  Location: [facility] PAIN MGT;  Service: Pain Management;  Laterality: Right;    REMOVAL OF VASCULAR ACCESS PORT      ROBOT-ASSISTED LAPAROSCOPIC ABDOMINAL SACROCOLPOPEXY N/A 8/10/2023    Procedure: ROBOTIC SACROCOLPOPEXY, ABDOMEN;  Surgeon: PRANAY Villalobos MD;  Location: [facility] OR;  Service: OB/GYN;  Laterality: N/A;    ROBOT-ASSISTED LAPAROSCOPIC OOPHORECTOMY Right 8/10/2023    Procedure: ROBOTIC OOPHORECTOMY;  Surgeon: PRANAY Villalobos MD;  Location: [facility] OR;  Service: OB/GYN;  Laterality: Right;    SENTINEL LYMPH NODE BIOPSY Left 10/12/2021    Procedure: BIOPSY, LYMPH NODE, SENTINEL;  Surgeon: Christoph Douglas MD;  Location: [facility] OR;  Service: General;  Laterality: Left;    TOE SURGERY      XI ROBOTIC URETHROPEXY N/A 8/10/2023    Procedure: XI ROBOTIC URETHROPEXY;  Surgeon: PRANAY Villalobos MD;  Location: [facility] OR;  Service: OB/GYN;  Laterality: N/A;       Family History   Problem Relation Age of Onset    Cancer Mother 38        breast    Breast cancer Mother     Breast cancer Maternal Grandmother     Heart disease Maternal Grandmother      Hypertension Son     Cataracts Cousin     Diabetes Neg Hx     Stroke Neg Hx     Kidney disease Neg Hx     Asthma Neg Hx     COPD Neg Hx     Melanoma Neg Hx     Hyperlipidemia Neg Hx        Social History     Socioeconomic History    Marital status: Single    Number of children: 2    Highest education level: 11th grade   Occupational History    Occupation: Retired   Tobacco Use    Smoking status: Never    Smokeless tobacco: Never   Substance and Sexual Activity    Alcohol use: Never     Alcohol/week: 0.0 standard drinks of alcohol    Drug use: No    Sexual activity: Not Currently     Partners: Male     Birth control/protection: See Surgical Hx   Other Topics Concern    Are you pregnant or think you may be? No    Breast-feeding No   Social History Narrative    Single. 2 children , 1  at 30 yoa   strep throat -  pneumonia and renal complications after not completing course of AB. Other child lives in [place], [place]. Has a cousin locally that could help in an emergency. 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 Caffeine intake =- 1 cola a day. No coffee, + occasional tea, avoids caffeine especially at night. Still drives. She does not have a Living Will or Advanced directive.      Social Determinants of Health     Financial Resource Strain: Low Risk  (2023)    Overall Financial Resource Strain (CARDIA)     Difficulty of Paying Living Expenses: Not very hard   Food Insecurity: No Food Insecurity (2023)    Hunger Vital Sign     Worried About Running Out of Food in the Last Year: Never true     Ran Out of Food in the Last Year: Never true   Transportation Needs: No Transportation Needs (2023)    PRAPARE - Transportation     Lack of Transportation (Medical): No     Lack of Transportation (Non-Medical): No   Physical Activity: Insufficiently Active (2023)    Exercise Vital Sign     Days of Exercise per Week: 2 days     Minutes of Exercise per Session: 60 min   Stress:  No Stress Concern Present (9/22/2023)    [place] [employer] - Occupational Stress Questionnaire     Feeling of Stress : Not at all   Social Connections: Moderately Integrated (9/22/2023)    Social Connection and Isolation Panel [NHANES]     Frequency of Communication with Friends and Family: Three times a week     Frequency of Social Gatherings with Friends and Family: Once a week     Attends [employer] Services: More than 4 times per year     Active Member of Clubs or Organizations: Yes     Attends Club or Organization Meetings: More than 4 times per year     Marital Status: Never    Housing Stability: Low Risk  (9/22/2023)    Housing Stability Vital Sign     Unable to Pay for Housing in the Last Year: No     Number of Places Lived in the Last Year: 1     Unstable Housing in the Last Year: No       Vitals:    10/09/23 1127   Resp: 14           Physical Exam  Constitutional:       Appearance: She is well-developed.   HENT:      Head: Normocephalic and atraumatic.      Right Ear: External ear normal.      Left Ear: External ear normal.      Mouth/Throat:      Pharynx: No oropharyngeal exudate.   Eyes:      General: No scleral icterus.        Right eye: No discharge.         Left eye: No discharge.      Conjunctiva/sclera: Conjunctivae normal.      Pupils: Pupils are equal, round, and reactive to light.   Neck:      Thyroid: No thyromegaly.   Chest:   Breasts:     Right: No inverted nipple, mass, nipple discharge, skin change or tenderness.      Left: No inverted nipple, mass, nipple discharge, skin change or tenderness.   Musculoskeletal:         General: Tenderness (left axilla and anterior shoulder tightness- no lymphedema) present.      Cervical back: Normal range of motion and neck supple.   Lymphadenopathy:      Head:      Right side of head: No submental, submandibular, tonsillar, preauricular, posterior auricular or occipital adenopathy.      Left side of head: No submental,  submandibular, tonsillar, preauricular, posterior auricular or occipital adenopathy.      Cervical: No cervical adenopathy.      Right cervical: No superficial or posterior cervical adenopathy.     Left cervical: No superficial or posterior cervical adenopathy.      Upper Body:      Right upper body: No supraclavicular adenopathy.      Left upper body: No supraclavicular adenopathy.   Skin:     General: Skin is warm and dry.      Coloration: Skin is not pale.      Findings: No erythema or rash.   Neurological:      Mental Status: She is alert and oriented to person, place, and time.   Psychiatric:         Behavior: Behavior normal.         Thought Content: Thought content normal.         Judgment: Judgment normal.       9/19/2022  Result:   Mammo Digital Diagnostic Bilat with Iván  US Breast Bilateral Limited     History:  Patient is 67 y.o. and is seen for diagnostic imaging.     Films Compared:  Prior images (if available) were compared.     Findings:  This procedure was performed using tomosynthesis. Computer-aided detection was utilized in the interpretation of this examination.  The breasts have scattered areas of fibroglandular density.         Left breast demonstrates postoperative lumpectomy findings of the upper outer breast with post radiation skin thickening.  No detrimental mammographic change appreciated.  Left axillary region postsurgical findings present without concerning mammographic mass.            Ultrasound was performed at the area of pain within the left axilla without corresponding sonographic abnormality.  Follow-up imaging at the 3 o'clock position demonstrates resolution of previously noted fluid with ill-defined hypoechoic area felt to represent postsurgical scarring.           Right breast demonstrates multiple lateral oval masses with the more anterior mass larger when compared to 07/13/2021.  Ultrasound demonstrates multiple complex cysts at 09:00 o'clock corresponding to mammographic  lesions.  Largest cyst measures 9 mm and corresponds to the more anterior mammographic lesion.  Six-month follow-up can be obtained.            Impression:  Right breast 09:00 o'clock complex cyst. Six-month follow-up recommended.   No axillary abnormality at the area pain with postsurgical and radiation changes of the left breast. Continue follow-up recommended.         BI-RADS Category:   Overall: 3 - Probably Benign     Recommendation:  Short interval follow-up is recommended in 6 Months.      10/31/2022  Final Pathologic Diagnosis 1. Right breast (3 o'clock position), biopsy:       -  Benign breast tissue with fibrosis and marked duct ectasia with   microcyst formation and papillary         apocrine metaplasia       -  Negative for atypia or malignancy        Bilateral diagnostic mammo 4/10/2023- wnl    Assessment & Plan:  Secondary and unspecified malignant neoplasm of axilla and upper limb lymph nodes    Ductal carcinoma in situ (DCIS) of left breast    Encounter for follow-up surveillance of breast cancer  -     Mammo Digital Diagnostic Bilat with Iván; Future; Expected date: 11/08/2024    Counseling and coordination of care    Counseling on health promotion and disease prevention    Health education/counseling    Encounter for breast cancer screening using non-mammogram modality      Negative clinical findings on exam.  mckenzie diagnostic mammo 4/10/2023- wnl  S/p benign core biopsy right breast 10/2022   RTC April 2024 for mckenzie diagnostic mammo and exam  BSE encouraged- call for any changes  Eating healthy diet and exercising encouraged  Left axilla tightness with ROM- pt had therapy with PT  4/2023- improved significantly and in PT as needed now

## 2023-10-02 DIAGNOSIS — Z94.1 HEART TRANSPLANTED: Chronic | ICD-10-CM

## 2023-10-02 RX ORDER — CYCLOSPORINE 25 MG/1
75 CAPSULE, LIQUID FILLED ORAL 2 TIMES DAILY
Qty: 540 CAPSULE | Refills: 0 | Status: SHIPPED | OUTPATIENT
Start: 2023-10-02 | End: 2023-11-01

## 2023-10-04 DIAGNOSIS — M79.7 FIBROMYALGIA: ICD-10-CM

## 2023-10-04 DIAGNOSIS — F51.01 PRIMARY INSOMNIA: ICD-10-CM

## 2023-10-04 DIAGNOSIS — I10 ESSENTIAL HYPERTENSION: ICD-10-CM

## 2023-10-05 RX ORDER — PREGABALIN 50 MG/1
50 CAPSULE ORAL 2 TIMES DAILY
Qty: 180 CAPSULE | Refills: 1 | Status: SHIPPED | OUTPATIENT
Start: 2023-10-05 | End: 2024-04-02

## 2023-10-05 RX ORDER — ZOLPIDEM TARTRATE 10 MG/1
10 TABLET ORAL NIGHTLY PRN
Qty: 90 TABLET | Refills: 0 | Status: SHIPPED | OUTPATIENT
Start: 2023-10-05 | End: 2024-01-29 | Stop reason: SDUPTHER

## 2023-10-05 RX ORDER — HYDRALAZINE HYDROCHLORIDE 50 MG/1
50 TABLET, FILM COATED ORAL EVERY 8 HOURS
Qty: 270 TABLET | Refills: 3 | Status: SHIPPED | OUTPATIENT
Start: 2023-10-05 | End: 2024-10-04

## 2023-10-09 ENCOUNTER — LAB VISIT (OUTPATIENT)
Dept: LAB | Facility: HOSPITAL | Age: 68
End: 2023-10-09
Attending: COLON & RECTAL SURGERY
Payer: MEDICARE

## 2023-10-09 ENCOUNTER — OFFICE VISIT (OUTPATIENT)
Dept: SURGERY | Facility: CLINIC | Age: 68
End: 2023-10-09
Payer: MEDICARE

## 2023-10-09 VITALS — WEIGHT: 160.5 LBS | RESPIRATION RATE: 14 BRPM | BODY MASS INDEX: 29.53 KG/M2 | HEIGHT: 62 IN

## 2023-10-09 DIAGNOSIS — D05.12 DUCTAL CARCINOMA IN SITU (DCIS) OF LEFT BREAST: ICD-10-CM

## 2023-10-09 DIAGNOSIS — C77.3 SECONDARY AND UNSPECIFIED MALIGNANT NEOPLASM OF AXILLA AND UPPER LIMB LYMPH NODES: Primary | ICD-10-CM

## 2023-10-09 DIAGNOSIS — Z71.89 COUNSELING AND COORDINATION OF CARE: ICD-10-CM

## 2023-10-09 DIAGNOSIS — Z71.89 COUNSELING ON HEALTH PROMOTION AND DISEASE PREVENTION: ICD-10-CM

## 2023-10-09 DIAGNOSIS — Z12.39 ENCOUNTER FOR BREAST CANCER SCREENING USING NON-MAMMOGRAM MODALITY: ICD-10-CM

## 2023-10-09 DIAGNOSIS — N18.4 ANEMIA ASSOCIATED WITH STAGE 4 CHRONIC RENAL FAILURE: Chronic | ICD-10-CM

## 2023-10-09 DIAGNOSIS — D63.1 ANEMIA ASSOCIATED WITH STAGE 4 CHRONIC RENAL FAILURE: Chronic | ICD-10-CM

## 2023-10-09 DIAGNOSIS — Z08 ENCOUNTER FOR FOLLOW-UP SURVEILLANCE OF BREAST CANCER: ICD-10-CM

## 2023-10-09 DIAGNOSIS — Z71.9 HEALTH EDUCATION/COUNSELING: ICD-10-CM

## 2023-10-09 DIAGNOSIS — Z85.3 ENCOUNTER FOR FOLLOW-UP SURVEILLANCE OF BREAST CANCER: ICD-10-CM

## 2023-10-09 LAB
BASOPHILS # BLD AUTO: 0.02 K/UL (ref 0–0.2)
BASOPHILS NFR BLD: 0.6 % (ref 0–1.9)
DIFFERENTIAL METHOD: ABNORMAL
EOSINOPHIL # BLD AUTO: 0.1 K/UL (ref 0–0.5)
EOSINOPHIL NFR BLD: 3.2 % (ref 0–8)
ERYTHROCYTE [DISTWIDTH] IN BLOOD BY AUTOMATED COUNT: 16.7 % (ref 11.5–14.5)
HCT VFR BLD AUTO: 28.7 % (ref 37–48.5)
HGB BLD-MCNC: 9.1 G/DL (ref 12–16)
IMM GRANULOCYTES # BLD AUTO: 0.02 K/UL (ref 0–0.04)
IMM GRANULOCYTES NFR BLD AUTO: 0.6 % (ref 0–0.5)
LYMPHOCYTES # BLD AUTO: 0.9 K/UL (ref 1–4.8)
LYMPHOCYTES NFR BLD: 28.2 % (ref 18–48)
MCH RBC QN AUTO: 28 PG (ref 27–31)
MCHC RBC AUTO-ENTMCNC: 31.7 G/DL (ref 32–36)
MCV RBC AUTO: 88 FL (ref 82–98)
MONOCYTES # BLD AUTO: 0.5 K/UL (ref 0.3–1)
MONOCYTES NFR BLD: 17 % (ref 4–15)
NEUTROPHILS # BLD AUTO: 1.6 K/UL (ref 1.8–7.7)
NEUTROPHILS NFR BLD: 50.4 % (ref 38–73)
NRBC BLD-RTO: 0 /100 WBC
PLATELET # BLD AUTO: 194 K/UL (ref 150–450)
PMV BLD AUTO: 8.7 FL (ref 9.2–12.9)
RBC # BLD AUTO: 3.25 M/UL (ref 4–5.4)
WBC # BLD AUTO: 3.12 K/UL (ref 3.9–12.7)

## 2023-10-09 PROCEDURE — 3008F PR BODY MASS INDEX (BMI) DOCUMENTED: ICD-10-PCS | Mod: HCNC,CPTII,S$GLB, | Performed by: NURSE PRACTITIONER

## 2023-10-09 PROCEDURE — 1126F PR PAIN SEVERITY QUANTIFIED, NO PAIN PRESENT: ICD-10-PCS | Mod: HCNC,CPTII,S$GLB, | Performed by: NURSE PRACTITIONER

## 2023-10-09 PROCEDURE — 3066F PR DOCUMENTATION OF TREATMENT FOR NEPHROPATHY: ICD-10-PCS | Mod: HCNC,CPTII,S$GLB, | Performed by: NURSE PRACTITIONER

## 2023-10-09 PROCEDURE — 1101F PT FALLS ASSESS-DOCD LE1/YR: CPT | Mod: HCNC,CPTII,S$GLB, | Performed by: NURSE PRACTITIONER

## 2023-10-09 PROCEDURE — 99214 OFFICE O/P EST MOD 30 MIN: CPT | Mod: HCNC,S$GLB,, | Performed by: NURSE PRACTITIONER

## 2023-10-09 PROCEDURE — 1157F ADVNC CARE PLAN IN RCRD: CPT | Mod: HCNC,CPTII,S$GLB, | Performed by: NURSE PRACTITIONER

## 2023-10-09 PROCEDURE — 3288F FALL RISK ASSESSMENT DOCD: CPT | Mod: HCNC,CPTII,S$GLB, | Performed by: NURSE PRACTITIONER

## 2023-10-09 PROCEDURE — 99999 PR PBB SHADOW E&M-EST. PATIENT-LVL IV: CPT | Mod: PBBFAC,HCNC,, | Performed by: NURSE PRACTITIONER

## 2023-10-09 PROCEDURE — 1159F PR MEDICATION LIST DOCUMENTED IN MEDICAL RECORD: ICD-10-PCS | Mod: HCNC,CPTII,S$GLB, | Performed by: NURSE PRACTITIONER

## 2023-10-09 PROCEDURE — 1160F PR REVIEW ALL MEDS BY PRESCRIBER/CLIN PHARMACIST DOCUMENTED: ICD-10-PCS | Mod: HCNC,CPTII,S$GLB, | Performed by: NURSE PRACTITIONER

## 2023-10-09 PROCEDURE — 85025 COMPLETE CBC W/AUTO DIFF WBC: CPT | Mod: HCNC

## 2023-10-09 PROCEDURE — 36415 COLL VENOUS BLD VENIPUNCTURE: CPT | Mod: HCNC

## 2023-10-09 PROCEDURE — 3044F PR MOST RECENT HEMOGLOBIN A1C LEVEL <7.0%: ICD-10-PCS | Mod: HCNC,CPTII,S$GLB, | Performed by: NURSE PRACTITIONER

## 2023-10-09 PROCEDURE — 3066F NEPHROPATHY DOC TX: CPT | Mod: HCNC,CPTII,S$GLB, | Performed by: NURSE PRACTITIONER

## 2023-10-09 PROCEDURE — 99999 PR PBB SHADOW E&M-EST. PATIENT-LVL IV: ICD-10-PCS | Mod: PBBFAC,HCNC,, | Performed by: NURSE PRACTITIONER

## 2023-10-09 PROCEDURE — 3288F PR FALLS RISK ASSESSMENT DOCUMENTED: ICD-10-PCS | Mod: HCNC,CPTII,S$GLB, | Performed by: NURSE PRACTITIONER

## 2023-10-09 PROCEDURE — 3008F BODY MASS INDEX DOCD: CPT | Mod: HCNC,CPTII,S$GLB, | Performed by: NURSE PRACTITIONER

## 2023-10-09 PROCEDURE — 1126F AMNT PAIN NOTED NONE PRSNT: CPT | Mod: HCNC,CPTII,S$GLB, | Performed by: NURSE PRACTITIONER

## 2023-10-09 PROCEDURE — 1101F PR PT FALLS ASSESS DOC 0-1 FALLS W/OUT INJ PAST YR: ICD-10-PCS | Mod: HCNC,CPTII,S$GLB, | Performed by: NURSE PRACTITIONER

## 2023-10-09 PROCEDURE — 1160F RVW MEDS BY RX/DR IN RCRD: CPT | Mod: HCNC,CPTII,S$GLB, | Performed by: NURSE PRACTITIONER

## 2023-10-09 PROCEDURE — 99214 PR OFFICE/OUTPT VISIT, EST, LEVL IV, 30-39 MIN: ICD-10-PCS | Mod: HCNC,S$GLB,, | Performed by: NURSE PRACTITIONER

## 2023-10-09 PROCEDURE — 1159F MED LIST DOCD IN RCRD: CPT | Mod: HCNC,CPTII,S$GLB, | Performed by: NURSE PRACTITIONER

## 2023-10-09 PROCEDURE — 1157F PR ADVANCE CARE PLAN OR EQUIV PRESENT IN MEDICAL RECORD: ICD-10-PCS | Mod: HCNC,CPTII,S$GLB, | Performed by: NURSE PRACTITIONER

## 2023-10-09 PROCEDURE — 3044F HG A1C LEVEL LT 7.0%: CPT | Mod: HCNC,CPTII,S$GLB, | Performed by: NURSE PRACTITIONER

## 2023-10-09 RX ORDER — EPOETIN ALFA-EPBX 20000 [IU]/ML
INJECTION, SOLUTION INTRAVENOUS; SUBCUTANEOUS
COMMUNITY
Start: 2023-06-19

## 2023-10-10 ENCOUNTER — CLINICAL SUPPORT (OUTPATIENT)
Dept: REHABILITATION | Facility: HOSPITAL | Age: 68
End: 2023-10-10
Payer: MEDICARE

## 2023-10-10 ENCOUNTER — INFUSION (OUTPATIENT)
Dept: INFUSION THERAPY | Facility: HOSPITAL | Age: 68
End: 2023-10-10
Attending: INTERNAL MEDICINE
Payer: MEDICARE

## 2023-10-10 VITALS
OXYGEN SATURATION: 99 % | TEMPERATURE: 98 F | BODY MASS INDEX: 29.35 KG/M2 | SYSTOLIC BLOOD PRESSURE: 133 MMHG | HEART RATE: 92 BPM | DIASTOLIC BLOOD PRESSURE: 78 MMHG | RESPIRATION RATE: 16 BRPM | WEIGHT: 160.5 LBS

## 2023-10-10 DIAGNOSIS — M81.8 OTHER OSTEOPOROSIS WITHOUT CURRENT PATHOLOGICAL FRACTURE: ICD-10-CM

## 2023-10-10 DIAGNOSIS — N18.4 CKD (CHRONIC KIDNEY DISEASE) STAGE 4, GFR 15-29 ML/MIN: Primary | ICD-10-CM

## 2023-10-10 DIAGNOSIS — D63.1 ANEMIA ASSOCIATED WITH STAGE 4 CHRONIC RENAL FAILURE: Primary | ICD-10-CM

## 2023-10-10 DIAGNOSIS — N18.4 ANEMIA ASSOCIATED WITH STAGE 4 CHRONIC RENAL FAILURE: Primary | ICD-10-CM

## 2023-10-10 DIAGNOSIS — D05.12 DUCTAL CARCINOMA IN SITU (DCIS) OF LEFT BREAST: Primary | ICD-10-CM

## 2023-10-10 PROCEDURE — 97535 SELF CARE MNGMENT TRAINING: CPT | Mod: HCNC,PN

## 2023-10-10 PROCEDURE — 63600175 PHARM REV CODE 636 W HCPCS: Mod: JZ,EC,JG,HCNC

## 2023-10-10 PROCEDURE — 97140 MANUAL THERAPY 1/> REGIONS: CPT | Mod: HCNC,PN

## 2023-10-10 PROCEDURE — 96372 THER/PROPH/DIAG INJ SC/IM: CPT | Mod: HCNC

## 2023-10-10 PROCEDURE — 97110 THERAPEUTIC EXERCISES: CPT | Mod: HCNC,PN

## 2023-10-10 RX ADMIN — EPOETIN ALFA-EPBX 20000 UNITS: 20000 INJECTION, SOLUTION INTRAVENOUS; SUBCUTANEOUS at 01:10

## 2023-10-10 NOTE — NURSING
Retacrit Injection given without difficulties.Bandaid applied. Patient instructed to stay in the clinic for 15 minutes. Patient verbalized understanding and will notify nurse with any complaints.

## 2023-10-10 NOTE — PROGRESS NOTES
Physical Therapy/Lymphedema Daily Treatment Note/PROGRESS NOTE     Name: Nadia Damon  Clinic Number: [id number]    Therapy Diagnosis:   Encounter Diagnosis   Name Primary?    Ductal carcinoma in situ (DCIS) of left breast Yes       Physician: Kristine Delgado NP    Visit Date: 10/10/2023    Physician Orders: PT Eval and Treat   Medical Diagnosis: chronic left shoulder pain  Evaluation Date: 4/11/2023  Authorization Period Expiration: 12/31/2023  Progress Note due: 10/10/2023  Plan of Care Expiration: 10/10/2023  Visit #/Visits authorized: 6/20      Time In: 1:35 pm  Time Out: 2:30 pm  Total Billable Time: 55 minutes    Precautions: Standard    Subjective     Pt reports: doing much better since the last visit. Continues to have bladder issues and unable to hold her urine at times. Her doctor did not order pelvic floor  She was compliant with home exercise program.  Response to previous treatment: good  Functional change: none at this time    Pain: 0/10  Location: left axilla when putting hand behind head    Objective     Objective Measures updated at progress report unless specified      CMS Impairment/Limitation/Restriction for FOTO shoulder Survey    Therapist reviewed FOTO scores for Nadia Damon on 10/10/2023.   FOTO documents entered into [employer] - see Media section.    Limitation Score: 66% (eval), 50% (2nd), 53% (3rd)       Shoulder Range of Motion:   ACTIVE ROM LEFT RIGHT   Flexion 180  180   Abduction 180 180   Extension wnl wnl   IR/90deg Reach to bra line Reach to bra line   ER/90deg Reach to T2 Reach to T2      Strength: manual muscle test grades below      Upper Extremity Strength  Right upper extremity - 4/5 throughout  Left upper extremity - 4/5 throughout     Treatment:     Nadia received the following self care and home management x 10 minutes    -axillary cord release techniques instructed  -Manual Lymph Drainage of left arm and breast review; instruction provided for technique.        Nadia received therapeutic exercises to develop strength, ROM, flexibility, and posture for 20 minutes including:   THERAPEUTIC EXERCISES:  Continue HEP of AROM, stretching, and postural correction.     Intervention Performed Today  COVID FATIGUE   Supine overhead flexion x  AAROM   Supine chicken wing/cactus arms x     Open books/windmills x  standing    Shoulder abduction + flexion x Cookie sheet with red band   Modified child's pose      Cat cow stretch      Quadruped rock backs      Child's pose      Reach and thread the needle      Shoulder abduction overhead     Wall lift offs x    SA presses  x Modified plank at table   Elliptical  1.20 minutes   Matrix rows  15# 3x10   Matrix biceps  40# x 30           Plan for Next Visit: Will receive soft tissue mobilization to left shoulder and updated exercises prn for the left shoulder motion and strengthening for bilateral upper extremities/shoulders        Nadia received the following manual therapy techniques applied for (25) minutes, including:      Intervention Performed Today     Cervical soft tissue mobilization      Upper trap soft tissue mobilization      Clavicular, intercostal soft tissue mobilization      Pec muscle soft tissue mobilization/release x    Subscapularis soft tissue mobilization/release x    Latissimus dors muscle soft tissue mobilization  x    Rhomboids soft tissue mobilization      Axillary Web Syndrome release techniques x   instructed in self release techinques              Plan for Next Visit: manual therapy as needed; AXILLARY CORD RELEASE         Home Exercises Provided and Patient Education Provided:   See self care section above    Written Home Exercises Provided: yes. Refer to last visit  Exercises were reviewed and Nadia was able to demonstrate them prior to the end of the session.  Nadia demonstrated good  understanding of the education provided.     Assessment     Nadia is progressing well. She has improved with the FOTO  disability score. Range of motion and strength are the same since the last progress note. She wants to continue PT towards goals in new cert period which will begin at the next visit.   Pt prognosis is Excellent.     Pt will continue to benefit from skilled outpatient physical therapy to address the deficits listed in the problem list box on initial evaluation, provide pt/family education and to maximize pt's level of independence in the home and community environment.     Pt's spiritual, cultural and educational needs considered and pt agreeable to plan of care and goals.     Anticipated barriers to physical therapy: none    Goals:     Short Term goals: 6 weeks  1. Patient will demonstrate 100% understanding of lymphedema risk reduction practices to include self monitoring for lymphedema. (GOAL MET)  2. Patient will demonstrate independence with Home Exercise program established. (progressing, not met)  3. Pt will increase AROM/PROM in shoulder abduction ROM to 130 degrees on left to improve functional reach, carry, push, pull pain free. (GOAL MET)  4. Pt will increase AROM/PROM in shoulder flexion to 115 degrees on right to improve functional reach, carry, push, pull pain free.(GOAL MET)        Long Term Goals: 12 weeks   1.  Pt will increase AROM/PROM in shoulder flexion to 170 degrees on right to improve functional reach, carry, push, pull pain free. (GOAL MET)  2. Pt will increase strength to 4+/5 in gross UE musculature to improve tolerance to all functional activities pain free. (progressing, not met)  3. Pt will demonstrate full/maximized tissue mobility to increase ROM and promote healthy tissue to be pain free at discharge. (progressing, not met)  4. Pt will report decrease in overall worst pain to 2/10 at discharge. (progressing, not met)  5. Pt will increase AROM/PROM in shoulder abduction ROM to 170 degrees on right to improve functional reach, carry, push, pull pain free. (GOAL MET)  6. Patient will  report compliance with walking program 5x week for 20 min each day to improve overall cardiovascular function and decrease cancer related fatigue at discharge. (progressing, not met)        Plan   Outpatient physical therapy 1-2x week for 10 weeks to include the following:   Manual Therapy, Patient Education, Self Care, Therapeutic Activities, and Therapeutic Exercise.     Plan of care Certification: 7/18/2023 to 10/10/2023        Cornelia Tian, PT

## 2023-10-11 ENCOUNTER — LAB VISIT (OUTPATIENT)
Dept: LAB | Facility: HOSPITAL | Age: 68
End: 2023-10-11
Attending: INTERNAL MEDICINE
Payer: MEDICARE

## 2023-10-11 DIAGNOSIS — N18.4 CKD (CHRONIC KIDNEY DISEASE) STAGE 4, GFR 15-29 ML/MIN: ICD-10-CM

## 2023-10-11 DIAGNOSIS — D84.9 IMMUNOSUPPRESSION: ICD-10-CM

## 2023-10-11 DIAGNOSIS — I10 PRIMARY HYPERTENSION: ICD-10-CM

## 2023-10-11 LAB
ALBUMIN SERPL BCP-MCNC: 3.4 G/DL (ref 3.5–5.2)
ANION GAP SERPL CALC-SCNC: 11 MMOL/L (ref 8–16)
BUN SERPL-MCNC: 39 MG/DL (ref 8–23)
CALCIUM SERPL-MCNC: 8.8 MG/DL (ref 8.7–10.5)
CHLORIDE SERPL-SCNC: 110 MMOL/L (ref 95–110)
CO2 SERPL-SCNC: 20 MMOL/L (ref 23–29)
CREAT SERPL-MCNC: 2.4 MG/DL (ref 0.5–1.4)
EST. GFR  (NO RACE VARIABLE): 21.3 ML/MIN/1.73 M^2
GLUCOSE SERPL-MCNC: 102 MG/DL (ref 70–110)
PHOSPHATE SERPL-MCNC: 3.7 MG/DL (ref 2.7–4.5)
POTASSIUM SERPL-SCNC: 5.3 MMOL/L (ref 3.5–5.1)
SODIUM SERPL-SCNC: 141 MMOL/L (ref 136–145)

## 2023-10-11 PROCEDURE — 80069 RENAL FUNCTION PANEL: CPT | Mod: HCNC | Performed by: INTERNAL MEDICINE

## 2023-10-11 PROCEDURE — 36415 COLL VENOUS BLD VENIPUNCTURE: CPT | Mod: HCNC | Performed by: INTERNAL MEDICINE

## 2023-10-11 PROCEDURE — 83970 ASSAY OF PARATHORMONE: CPT | Mod: HCNC | Performed by: INTERNAL MEDICINE

## 2023-10-12 LAB — PTH-INTACT SERPL-MCNC: 447.9 PG/ML (ref 9–77)

## 2023-10-13 ENCOUNTER — CLINICAL SUPPORT (OUTPATIENT)
Dept: REHABILITATION | Facility: HOSPITAL | Age: 68
End: 2023-10-13
Payer: MEDICARE

## 2023-10-13 DIAGNOSIS — D05.12 DUCTAL CARCINOMA IN SITU (DCIS) OF LEFT BREAST: Primary | ICD-10-CM

## 2023-10-13 PROCEDURE — 97110 THERAPEUTIC EXERCISES: CPT | Mod: HCNC,PN

## 2023-10-13 PROCEDURE — 97140 MANUAL THERAPY 1/> REGIONS: CPT | Mod: HCNC,PN

## 2023-10-13 NOTE — PROGRESS NOTES
Physical Therapy/Lymphedema Daily Treatment Note     Name: Nadia Damon  Clinic Number: [id number]    Therapy Diagnosis:   Encounter Diagnosis   Name Primary?    Ductal carcinoma in situ (DCIS) of left breast Yes         Physician: Kristine Delgado NP    Visit Date: 10/13/2023    Physician Orders: PT Eval and Treat   Medical Diagnosis: chronic left shoulder pain  Evaluation Date: 4/11/2023  Authorization Period Expiration: 12/31/2023  Progress Note due: 11/13/2023  Plan of Care Expiration: 12/22/2023  Visit #/Visits authorized: 7/20      Time In: 1:40 pm  Time Out: 2:35 pm  Total Billable Time: 55 minutes    Precautions: Standard    Subjective     Pt reports: doing well, arm feels better and motion has improved  She was compliant with home exercise program.  Response to previous treatment: good  Functional change: none at this time    Pain: 5/10  Location: left axilla when reaching overhead (tightness)    Objective     Objective Measures updated at progress report unless specified      CMS Impairment/Limitation/Restriction for FOTO shoulder Survey    Therapist reviewed FOTO scores for Nadia Damon on 10/10/2023.   FOTO documents entered into [employer] - see Media section.    Limitation Score: 66% (eval), 50% (2nd), 53% (3rd)         Treatment:     Nadia received the following self care and home management x -- minutes      Nadia received therapeutic exercises to develop strength, ROM, flexibility, and posture for 25 minutes including:   THERAPEUTIC EXERCISES:  Continue HEP of AROM, stretching, and postural correction.     Intervention Performed Today  COVID FATIGUE   Supine overhead flexion   AAROM   Supine chicken wing/cactus arms      Open books/windmills   standing    Shoulder abduction + flexion  Cookie sheet with red band   Modified child's pose      Cat cow stretch      Quadruped rock backs      Child's pose      Reach and thread the needle      Shoulder abduction overhead     Wall lift offs x With red  band   SA presses  x Modified plank at table   Elliptical  1.20 minutes   Matrix rows x 20# 3x10   Matrix chest press x 10# 2 x 9-10   Matrix triceps x 50# x 25   Matrix biceps x 10# x 3 (difficult)      Plan for Next Visit: Will receive soft tissue mobilization to left shoulder and updated exercises prn for the left shoulder motion and strengthening for bilateral upper extremities/shoulders        Nadia participated in neuromuscular re-education activities to improve: Kinesthetic and Sense for 10 minutes. The following activities were included:    Intervention Performed Today    TA activation x    Posterior pelvic tilt  x                                    Plan for Next Visit: due to hx of bladder sx and general weakness        Nadia received the following manual therapy techniques applied for (20) minutes, including:      Intervention Performed Today     Cervical soft tissue mobilization      Upper trap soft tissue mobilization      Clavicular, intercostal soft tissue mobilization      Pec muscle soft tissue mobilization/release x    Subscapularis soft tissue mobilization/release     Latissimus dors muscle soft tissue mobilization      Rhomboids soft tissue mobilization      Axillary Web Syndrome release techniques x   instructed in self release techinques              Plan for Next Visit: manual therapy as needed; AXILLARY CORD RELEASE         Home Exercises Provided and Patient Education Provided:   See self care section above    Written Home Exercises Provided: yes. Refer to last visit  Exercises were reviewed and Nadia was able to demonstrate them prior to the end of the session.  Nadia demonstrated good  understanding of the education provided.     Assessment     Nadia is progressing well. She was introduced to core strengthening exercises due to PMH. She worked on upper extremity strength after manual therapy and did well. She will continue PT for general strengthening of upper extremity and core  Pt  prognosis is Excellent.     Pt will continue to benefit from skilled outpatient physical therapy to address the deficits listed in the problem list box on initial evaluation, provide pt/family education and to maximize pt's level of independence in the home and community environment.     Pt's spiritual, cultural and educational needs considered and pt agreeable to plan of care and goals.     Anticipated barriers to physical therapy: none    Goals:     Short Term goals: 6 weeks  1. Patient will demonstrate 100% understanding of lymphedema risk reduction practices to include self monitoring for lymphedema. (GOAL MET)  2. Patient will demonstrate independence with Home Exercise program established. (progressing, not met)  3. Pt will increase AROM/PROM in shoulder abduction ROM to 130 degrees on left to improve functional reach, carry, push, pull pain free. (GOAL MET)  4. Pt will increase AROM/PROM in shoulder flexion to 115 degrees on right to improve functional reach, carry, push, pull pain free.(GOAL MET)        Long Term Goals: 12 weeks   1.  Pt will increase AROM/PROM in shoulder flexion to 170 degrees on right to improve functional reach, carry, push, pull pain free. (GOAL MET)  2. Pt will increase strength to 4+/5 in gross UE musculature to improve tolerance to all functional activities pain free. (progressing, not met)  3. Pt will demonstrate full/maximized tissue mobility to increase ROM and promote healthy tissue to be pain free at discharge. (progressing, not met)  4. Pt will report decrease in overall worst pain to 2/10 at discharge. (progressing, not met)  5. Pt will increase AROM/PROM in shoulder abduction ROM to 170 degrees on right to improve functional reach, carry, push, pull pain free. (GOAL MET)  6. Patient will report compliance with walking program 5x week for 20 min each day to improve overall cardiovascular function and decrease cancer related fatigue at discharge. (progressing, not met)         Plan   Outpatient physical therapy 1-2x week for 10 weeks to include the following:   Manual Therapy, Patient Education, Self Care, Therapeutic Activities, and Therapeutic Exercise.     Plan of care Certification: 10/13/2023 to 12/22/2023        Cornelia Tian, PT

## 2023-10-13 NOTE — PLAN OF CARE
Outpatient Therapy Updated Plan of Care      Visit Date: 10/13/2023  Name: Nadia Damon  Clinic Number: [id number]     Therapy Diagnosis:        Encounter Diagnosis   Name Primary?    Decreased shoulder mobility, left Yes      Physician: Kristine Delgado, NP     Physician Orders: PT Eval and Treat   Medical Diagnosis: chronic left shoulder pain  Evaluation Date: 4/11/2023     Total Visits Received: 7  Cancelled Visits: 3  No Show Visits: 0     Current Certification Period:  7/18/2023 to 10/10/2023  Precautions:  standard  Visits from Evaluation Date:  6  Functional Level Prior to Evaluation:  no impairments prior to breast sx     Subjective      Update: doing well, arm feels better and motion has improved     Objective      Update:      CMS Impairment/Limitation/Restriction for FOTO shoulder Survey     Therapist reviewed FOTO scores for Nadia Damon on 10/10/2023.   FOTO documents entered into [employer] - see Media section.     Limitation Score: 66% (eval), 50% (2nd), 53% (3rd)         Shoulder Range of Motion:   ACTIVE ROM LEFT RIGHT   Flexion 170  180   Abduction 170  180   Extension wnl wnl   IR/90deg Reach to bra line Reach to bra line   ER/90deg Reach to T2 Reach to T2      Strength: manual muscle test grades below      Upper Extremity Strength  Right upper extremity - 4/5 throughout  Left upper extremity - 4/5 throughout      Assessment   Nadia is progressing well with left shoulder range of motion and has had an improvement in overall pain levels. She was introduced to core strengthening exercises due to PMH. She worked on upper extremity strength after manual therapy and did well. She will continue PT for general strengthening of upper extremity and core.  .      Short Term goals: 6 weeks  1. Patient will demonstrate 100% understanding of lymphedema risk reduction practices to include self monitoring for lymphedema. (GOAL MET)  2. Patient will demonstrate independence with Home Exercise program  established. (progressing, not met)  3. Pt will increase AROM/PROM in shoulder abduction ROM to 130 degrees on left to improve functional reach, carry, push, pull pain free. (GOAL MET)  4. Pt will increase AROM/PROM in shoulder flexion to 115 degrees on right to improve functional reach, carry, push, pull pain free.(GOAL MET)        Long Term Goals: 12 weeks   1.  Pt will increase AROM/PROM in shoulder flexion to 170 degrees on right to improve functional reach, carry, push, pull pain free. (GOAL MET)  2. Pt will increase strength to 4+/5 in gross UE musculature to improve tolerance to all functional activities pain free. (progressing, not met)  3. Pt will demonstrate full/maximized tissue mobility to increase ROM and promote healthy tissue to be pain free at discharge. (progressing, not met)  4. Pt will report decrease in overall worst pain to 2/10 at discharge. (progressing, not met)  5. Pt will increase AROM/PROM in shoulder abduction ROM to 170 degrees on right to improve functional reach, carry, push, pull pain free. (GOAL MET)  6. Patient will report compliance with walking program 5x week for 20 min each day to improve overall cardiovascular function and decrease cancer related fatigue at discharge. (progressing, not met)        Long Term Goal Status:   continue per initial plan of care.  Reasons for Recertification of Therapy:   Pt will continue to benefit from skilled outpatient physical therapy to address the deficits listed in the problem list box on initial evaluation, provide pt/family education and to maximize pt's level of independence in the home and community environment.      Plan      Update Certification Period: 10/10/2023 to 12/22/2023  Recommended Treatment Plan: 1-2 times per week for 12 weeks: Manual Therapy, Neuromuscular Re-ed, Patient Education, Self Care, Therapeutic Exercise, and Therapeutic Activites     Cornelia Tian, PT, RENATA        I CERTIFY THE NEED FOR THESE SERVICES  FURNISHED UNDER THIS PLAN OF TREATMENT AND WHILE UNDER MY CARE     Physician's comments:           Physician's Signature: ___________________________________________________

## 2023-10-16 ENCOUNTER — TELEPHONE (OUTPATIENT)
Dept: INFUSION THERAPY | Facility: HOSPITAL | Age: 68
End: 2023-10-16
Payer: MEDICARE

## 2023-10-16 ENCOUNTER — OFFICE VISIT (OUTPATIENT)
Dept: NEPHROLOGY | Facility: CLINIC | Age: 68
End: 2023-10-16
Payer: MEDICARE

## 2023-10-16 ENCOUNTER — CLINICAL SUPPORT (OUTPATIENT)
Dept: REHABILITATION | Facility: HOSPITAL | Age: 68
End: 2023-10-16
Payer: MEDICARE

## 2023-10-16 ENCOUNTER — LAB VISIT (OUTPATIENT)
Dept: LAB | Facility: HOSPITAL | Age: 68
End: 2023-10-16
Attending: INTERNAL MEDICINE
Payer: MEDICARE

## 2023-10-16 VITALS
BODY MASS INDEX: 29.66 KG/M2 | HEIGHT: 62 IN | SYSTOLIC BLOOD PRESSURE: 124 MMHG | DIASTOLIC BLOOD PRESSURE: 70 MMHG | HEART RATE: 88 BPM | WEIGHT: 161.19 LBS

## 2023-10-16 DIAGNOSIS — E87.5 HYPERKALEMIA: ICD-10-CM

## 2023-10-16 DIAGNOSIS — I10 ESSENTIAL HYPERTENSION: Chronic | ICD-10-CM

## 2023-10-16 DIAGNOSIS — D05.12 DUCTAL CARCINOMA IN SITU (DCIS) OF LEFT BREAST: Primary | ICD-10-CM

## 2023-10-16 DIAGNOSIS — R80.8 OTHER PROTEINURIA: ICD-10-CM

## 2023-10-16 DIAGNOSIS — N18.4 CKD (CHRONIC KIDNEY DISEASE) STAGE 4, GFR 15-29 ML/MIN: Primary | ICD-10-CM

## 2023-10-16 DIAGNOSIS — D84.9 IMMUNOSUPPRESSION: ICD-10-CM

## 2023-10-16 DIAGNOSIS — I10 PRIMARY HYPERTENSION: ICD-10-CM

## 2023-10-16 LAB
BASOPHILS # BLD AUTO: 0.01 K/UL (ref 0–0.2)
BASOPHILS NFR BLD: 0.2 % (ref 0–1.9)
DIFFERENTIAL METHOD: ABNORMAL
EOSINOPHIL # BLD AUTO: 0.1 K/UL (ref 0–0.5)
EOSINOPHIL NFR BLD: 2.4 % (ref 0–8)
ERYTHROCYTE [DISTWIDTH] IN BLOOD BY AUTOMATED COUNT: 17.8 % (ref 11.5–14.5)
HCT VFR BLD AUTO: 31.2 % (ref 37–48.5)
HGB BLD-MCNC: 9.6 G/DL (ref 12–16)
IMM GRANULOCYTES # BLD AUTO: 0.02 K/UL (ref 0–0.04)
IMM GRANULOCYTES NFR BLD AUTO: 0.4 % (ref 0–0.5)
LYMPHOCYTES # BLD AUTO: 0.9 K/UL (ref 1–4.8)
LYMPHOCYTES NFR BLD: 17.8 % (ref 18–48)
MCH RBC QN AUTO: 27.5 PG (ref 27–31)
MCHC RBC AUTO-ENTMCNC: 30.8 G/DL (ref 32–36)
MCV RBC AUTO: 89 FL (ref 82–98)
MONOCYTES # BLD AUTO: 0.7 K/UL (ref 0.3–1)
MONOCYTES NFR BLD: 13 % (ref 4–15)
NEUTROPHILS # BLD AUTO: 3.3 K/UL (ref 1.8–7.7)
NEUTROPHILS NFR BLD: 66.2 % (ref 38–73)
NRBC BLD-RTO: 0 /100 WBC
PLATELET # BLD AUTO: 241 K/UL (ref 150–450)
PMV BLD AUTO: 9.6 FL (ref 9.2–12.9)
RBC # BLD AUTO: 3.49 M/UL (ref 4–5.4)
WBC # BLD AUTO: 5.01 K/UL (ref 3.9–12.7)

## 2023-10-16 PROCEDURE — 1101F PR PT FALLS ASSESS DOC 0-1 FALLS W/OUT INJ PAST YR: ICD-10-PCS | Mod: HCNC,CPTII,S$GLB, | Performed by: INTERNAL MEDICINE

## 2023-10-16 PROCEDURE — 97112 NEUROMUSCULAR REEDUCATION: CPT | Mod: HCNC,PN

## 2023-10-16 PROCEDURE — 1157F PR ADVANCE CARE PLAN OR EQUIV PRESENT IN MEDICAL RECORD: ICD-10-PCS | Mod: HCNC,CPTII,S$GLB, | Performed by: INTERNAL MEDICINE

## 2023-10-16 PROCEDURE — 3066F PR DOCUMENTATION OF TREATMENT FOR NEPHROPATHY: ICD-10-PCS | Mod: HCNC,CPTII,S$GLB, | Performed by: INTERNAL MEDICINE

## 2023-10-16 PROCEDURE — 3078F DIAST BP <80 MM HG: CPT | Mod: HCNC,CPTII,S$GLB, | Performed by: INTERNAL MEDICINE

## 2023-10-16 PROCEDURE — 3288F FALL RISK ASSESSMENT DOCD: CPT | Mod: HCNC,CPTII,S$GLB, | Performed by: INTERNAL MEDICINE

## 2023-10-16 PROCEDURE — 1160F PR REVIEW ALL MEDS BY PRESCRIBER/CLIN PHARMACIST DOCUMENTED: ICD-10-PCS | Mod: HCNC,CPTII,S$GLB, | Performed by: INTERNAL MEDICINE

## 2023-10-16 PROCEDURE — 3074F SYST BP LT 130 MM HG: CPT | Mod: HCNC,CPTII,S$GLB, | Performed by: INTERNAL MEDICINE

## 2023-10-16 PROCEDURE — 1101F PT FALLS ASSESS-DOCD LE1/YR: CPT | Mod: HCNC,CPTII,S$GLB, | Performed by: INTERNAL MEDICINE

## 2023-10-16 PROCEDURE — 3066F NEPHROPATHY DOC TX: CPT | Mod: HCNC,CPTII,S$GLB, | Performed by: INTERNAL MEDICINE

## 2023-10-16 PROCEDURE — 3044F HG A1C LEVEL LT 7.0%: CPT | Mod: HCNC,CPTII,S$GLB, | Performed by: INTERNAL MEDICINE

## 2023-10-16 PROCEDURE — 1126F AMNT PAIN NOTED NONE PRSNT: CPT | Mod: HCNC,CPTII,S$GLB, | Performed by: INTERNAL MEDICINE

## 2023-10-16 PROCEDURE — 1126F PR PAIN SEVERITY QUANTIFIED, NO PAIN PRESENT: ICD-10-PCS | Mod: HCNC,CPTII,S$GLB, | Performed by: INTERNAL MEDICINE

## 2023-10-16 PROCEDURE — 3008F PR BODY MASS INDEX (BMI) DOCUMENTED: ICD-10-PCS | Mod: HCNC,CPTII,S$GLB, | Performed by: INTERNAL MEDICINE

## 2023-10-16 PROCEDURE — 3288F PR FALLS RISK ASSESSMENT DOCUMENTED: ICD-10-PCS | Mod: HCNC,CPTII,S$GLB, | Performed by: INTERNAL MEDICINE

## 2023-10-16 PROCEDURE — 99999 PR PBB SHADOW E&M-EST. PATIENT-LVL IV: CPT | Mod: PBBFAC,HCNC,, | Performed by: INTERNAL MEDICINE

## 2023-10-16 PROCEDURE — 3074F PR MOST RECENT SYSTOLIC BLOOD PRESSURE < 130 MM HG: ICD-10-PCS | Mod: HCNC,CPTII,S$GLB, | Performed by: INTERNAL MEDICINE

## 2023-10-16 PROCEDURE — 3044F PR MOST RECENT HEMOGLOBIN A1C LEVEL <7.0%: ICD-10-PCS | Mod: HCNC,CPTII,S$GLB, | Performed by: INTERNAL MEDICINE

## 2023-10-16 PROCEDURE — 99214 PR OFFICE/OUTPT VISIT, EST, LEVL IV, 30-39 MIN: ICD-10-PCS | Mod: HCNC,S$GLB,, | Performed by: INTERNAL MEDICINE

## 2023-10-16 PROCEDURE — 99214 OFFICE O/P EST MOD 30 MIN: CPT | Mod: HCNC,S$GLB,, | Performed by: INTERNAL MEDICINE

## 2023-10-16 PROCEDURE — 1159F PR MEDICATION LIST DOCUMENTED IN MEDICAL RECORD: ICD-10-PCS | Mod: HCNC,CPTII,S$GLB, | Performed by: INTERNAL MEDICINE

## 2023-10-16 PROCEDURE — 3078F PR MOST RECENT DIASTOLIC BLOOD PRESSURE < 80 MM HG: ICD-10-PCS | Mod: HCNC,CPTII,S$GLB, | Performed by: INTERNAL MEDICINE

## 2023-10-16 PROCEDURE — 1157F ADVNC CARE PLAN IN RCRD: CPT | Mod: HCNC,CPTII,S$GLB, | Performed by: INTERNAL MEDICINE

## 2023-10-16 PROCEDURE — 85025 COMPLETE CBC W/AUTO DIFF WBC: CPT | Mod: HCNC | Performed by: NURSE PRACTITIONER

## 2023-10-16 PROCEDURE — 1160F RVW MEDS BY RX/DR IN RCRD: CPT | Mod: HCNC,CPTII,S$GLB, | Performed by: INTERNAL MEDICINE

## 2023-10-16 PROCEDURE — 97140 MANUAL THERAPY 1/> REGIONS: CPT | Mod: HCNC,PN

## 2023-10-16 PROCEDURE — 1159F MED LIST DOCD IN RCRD: CPT | Mod: HCNC,CPTII,S$GLB, | Performed by: INTERNAL MEDICINE

## 2023-10-16 PROCEDURE — 36415 COLL VENOUS BLD VENIPUNCTURE: CPT | Mod: HCNC | Performed by: NURSE PRACTITIONER

## 2023-10-16 PROCEDURE — 3008F BODY MASS INDEX DOCD: CPT | Mod: HCNC,CPTII,S$GLB, | Performed by: INTERNAL MEDICINE

## 2023-10-16 PROCEDURE — 99999 PR PBB SHADOW E&M-EST. PATIENT-LVL IV: ICD-10-PCS | Mod: PBBFAC,HCNC,, | Performed by: INTERNAL MEDICINE

## 2023-10-16 NOTE — PROGRESS NOTES
Physical Therapy/Lymphedema Daily Treatment Note     Name: Nadia Damon  Clinic Number: [id number]    Therapy Diagnosis:   Encounter Diagnosis   Name Primary?    Ductal carcinoma in situ (DCIS) of left breast Yes       Physician: Kristine Delgado NP    Visit Date: 10/16/2023    Physician Orders: PT Eval and Treat   Medical Diagnosis: chronic left shoulder pain  Evaluation Date: 4/11/2023  Authorization Period Expiration: 12/31/2023  Progress Note due: 11/13/2023  Plan of Care Expiration: 12/22/2023  Visit #/Visits authorized: 8/20      Time In: 0915 am  Time Out: 1010 am  Total Billable Time: 55 minutes    Precautions: Standard    Subjective     Pt reports: did too much at last session. She went home and continued her stretches and by that evening, her arm was throbbing  She was compliant with home exercise program.  Response to previous treatment: good  Functional change: none at this time    Pain: 6/10  Location: left axilla when reaching overhead (tightness and tenderness)    Objective     Objective Measures updated at progress report unless specified      CMS Impairment/Limitation/Restriction for FOTO shoulder Survey    Therapist reviewed FOTO scores for Nadia Damon on 10/10/2023.   FOTO documents entered into [employer] - see Media section.    Limitation Score: 66% (eval), 50% (2nd), 53% (3rd)         Treatment:     Nadia received the following self care and home management x -- minutes      Nadia received therapeutic exercises to develop strength, ROM, flexibility, and posture for -- minutes including:   THERAPEUTIC EXERCISES:  Continue HEP of AROM, stretching, and postural correction.     Intervention Performed Today  COVID FATIGUE   Supine overhead flexion   AAROM   Supine chicken wing/cactus arms      Open books/windmills   standing    Shoulder abduction + flexion  Cookie sheet with red band   Modified child's pose      Cat cow stretch      Quadruped rock backs      Child's pose      Reach and thread  the needle      Shoulder abduction overhead     Wall lift offs  With red band   SA presses   Modified plank at table   Elliptical  1.20 minutes   Matrix rows  20# 3x10   Matrix chest press  10# 2 x 9-10   Matrix triceps  50# x 25   Matrix biceps  10# x 3 (difficult)      Plan for Next Visit: Will receive soft tissue mobilization to left shoulder and updated exercises prn for the left shoulder motion and strengthening for bilateral upper extremities/shoulders        Nadia participated in neuromuscular re-education activities to improve: Kinesthetic and Sense for 38 minutes. The following activities were included:    Intervention Performed Today With verbal and tactile cues to avoid using compensatory muscles   TA activation with and w/o upper extremity motions x With stability ball;  with yellow band   Posterior pelvic tilt + TA with and w/o upper extremity motions x With stability ball;  with yellow band   Static shoulder external rotation with chest press in supine x With yellow band   Seated TA activation, posterior pelvic tilt and pelvic floor lift x Needs practice to posteriorly tilt pelvis   Seated rows with resistance band  x Yellow band with cues to avoid using trap muscles                    Plan for Next Visit: due to hx of bladder sx and general weakness        Nadia received the following manual therapy techniques applied for (17) minutes, including:      Intervention Performed Today     Cervical soft tissue mobilization      Upper trap soft tissue mobilization      Clavicular, intercostal soft tissue mobilization      Pec muscle soft tissue mobilization/release x    Subscapularis soft tissue mobilization/release     Latissimus dors muscle soft tissue mobilization      Rhomboids soft tissue mobilization      Axillary Web Syndrome release techniques x   instructed in self release techinques              Plan for Next Visit: manual therapy as needed; AXILLARY CORD RELEASE         Home Exercises  Provided and Patient Education Provided:   See self care section above    Written Home Exercises Provided: yes.   Exercises were reviewed and Nadia was able to demonstrate them prior to the end of the session.  Nadia demonstrated good  understanding of the education provided.     Assessment     Nadia reported an adverse response after the last session, feeling like she did too much. Today, exercises were modified and lighter resistance was used for shoulder strengthening. She left therapy without discomfort/pain.  Pt prognosis is Excellent.     Pt will continue to benefit from skilled outpatient physical therapy to address the deficits listed in the problem list box on initial evaluation, provide pt/family education and to maximize pt's level of independence in the home and community environment.     Pt's spiritual, cultural and educational needs considered and pt agreeable to plan of care and goals.     Anticipated barriers to physical therapy: none    Goals:     Short Term goals: 6 weeks  1. Patient will demonstrate 100% understanding of lymphedema risk reduction practices to include self monitoring for lymphedema. (GOAL MET)  2. Patient will demonstrate independence with Home Exercise program established. (progressing, not met)  3. Pt will increase AROM/PROM in shoulder abduction ROM to 130 degrees on left to improve functional reach, carry, push, pull pain free. (GOAL MET)  4. Pt will increase AROM/PROM in shoulder flexion to 115 degrees on right to improve functional reach, carry, push, pull pain free.(GOAL MET)        Long Term Goals: 10 weeks   1.  Pt will increase AROM/PROM in shoulder flexion to 170 degrees on right to improve functional reach, carry, push, pull pain free. (GOAL MET)  2. Pt will increase strength to 4+/5 in gross UE musculature to improve tolerance to all functional activities pain free. (progressing, not met)  3. Pt will demonstrate full/maximized tissue mobility to increase ROM and  promote healthy tissue to be pain free at discharge. (progressing, not met)  4. Pt will report decrease in overall worst pain to 2/10 at discharge. (progressing, not met)  5. Pt will increase AROM/PROM in shoulder abduction ROM to 170 degrees on right to improve functional reach, carry, push, pull pain free. (GOAL MET)  6. Patient will report compliance with walking program 5x week for 20 min each day to improve overall cardiovascular function and decrease cancer related fatigue at discharge. (progressing, not met)        Plan   Outpatient physical therapy 1-2x week for 10 weeks to include the following:   Manual Therapy, Patient Education, Self Care, Therapeutic Activities, and Therapeutic Exercise.     Plan of care Certification: 10/13/2023 to 12/22/2023        Cornelia Tian, PT

## 2023-10-16 NOTE — TELEPHONE ENCOUNTER
----- Message from Bhavik Forman sent at 10/16/2023 11:44 AM CDT -----  Contact: Nadia  Patient is calling to speak with the nurse in regards to appt for tomorrow. Needing a later appt time something around 3pm. Please give patient a callback [phone].

## 2023-10-16 NOTE — PROGRESS NOTES
"Subjective:       Patient ID: Nadia Damon is a 68 y.o. female.    Chief Complaint: Chronic Kidney Disease    HPI    She presents to clinic today for routine follow-up.  Since her last office visit she has been doing well and has no specific or new complaints.  Her laboratory studies and medications were reviewed.  All Nephrology related questions were answered to her satisfaction.    Review of Systems   Constitutional: Negative.    HENT: Negative.     Respiratory: Negative.     Cardiovascular: Negative.    Gastrointestinal: Negative.    Genitourinary: Negative.    Musculoskeletal: Negative.    Skin: Negative.        /70   Pulse 88   Ht 5' 2" (1.575 m)   Wt 73.1 kg (161 lb 2.5 oz)   LMP 06/20/1983 (Within Years)   BMI 29.48 kg/m²     Lab Results   Component Value Date    WBC 3.12 (L) 10/09/2023    HGB 9.1 (L) 10/09/2023    HCT 28.7 (L) 10/09/2023    MCV 88 10/09/2023     10/09/2023      BMP  Lab Results   Component Value Date     10/11/2023    K 5.3 (H) 10/11/2023     10/11/2023    CO2 20 (L) 10/11/2023    BUN 39 (H) 10/11/2023    CREATININE 2.4 (H) 10/11/2023    CALCIUM 8.8 10/11/2023    ANIONGAP 11 10/11/2023    ESTGFRAFRICA 19 (A) 07/14/2022    EGFRNONAA 17 (A) 07/14/2022     CMP  Sodium   Date Value Ref Range Status   10/11/2023 141 136 - 145 mmol/L Final     Potassium   Date Value Ref Range Status   10/11/2023 5.3 (H) 3.5 - 5.1 mmol/L Final     Comment:     *No Visible Hemolysis     Chloride   Date Value Ref Range Status   10/11/2023 110 95 - 110 mmol/L Final     CO2   Date Value Ref Range Status   10/11/2023 20 (L) 23 - 29 mmol/L Final     Glucose   Date Value Ref Range Status   10/11/2023 102 70 - 110 mg/dL Final     BUN   Date Value Ref Range Status   10/11/2023 39 (H) 8 - 23 mg/dL Final     Creatinine   Date Value Ref Range Status   10/11/2023 2.4 (H) 0.5 - 1.4 mg/dL Final     Calcium   Date Value Ref Range Status   10/11/2023 8.8 8.7 - 10.5 mg/dL Final     Total Protein "   Date Value Ref Range Status   09/19/2023 7.5 6.0 - 8.4 g/dL Final     Albumin   Date Value Ref Range Status   10/11/2023 3.4 (L) 3.5 - 5.2 g/dL Final     Total Bilirubin   Date Value Ref Range Status   09/19/2023 0.5 0.1 - 1.0 mg/dL Final     Comment:     For infants and newborns, interpretation of results should be based  on gestational age, weight and in agreement with clinical  observations.    Premature Infant recommended reference ranges:  Up to 24 hours.............<8.0 mg/dL  Up to 48 hours............<12.0 mg/dL  3-5 days..................<15.0 mg/dL  6-29 days.................<15.0 mg/dL       Alkaline Phosphatase   Date Value Ref Range Status   09/19/2023 111 55 - 135 U/L Final     AST   Date Value Ref Range Status   09/19/2023 28 10 - 40 U/L Final     ALT   Date Value Ref Range Status   09/19/2023 15 10 - 44 U/L Final     Anion Gap   Date Value Ref Range Status   10/11/2023 11 8 - 16 mmol/L Final     eGFR if    Date Value Ref Range Status   07/14/2022 19 (A) >60 mL/min/1.73 m^2 Final     eGFR if non    Date Value Ref Range Status   07/14/2022 17 (A) >60 mL/min/1.73 m^2 Final     Comment:     Calculation used to obtain the estimated glomerular filtration  rate (eGFR) is the CKD-EPI equation.        Current Outpatient Medications on File Prior to Visit   Medication Sig Dispense Refill    biotin 10,000 mcg Cap Take 1 tablet by mouth once daily.      calcitonin, salmon, (FORTICAL) 200 unit/actuation nasal spray 1 spray by Nasal route once daily. 3 each 3    carvediloL (COREG) 6.25 MG tablet Take 1 tablet (6.25 mg total) by mouth 2 (two) times daily with meals. 180 tablet 3    cycloSPORINE modified, NEORAL, (NEORAL) 25 MG capsule Take 3 capsules (75 mg total) by mouth 2 (two) times daily. 540 capsule 0    EVENING PRIMROSE OIL ORAL Take 1,000 mg by mouth once daily.      furosemide (LASIX) 20 MG tablet Take 1 tablet (20 mg total) by mouth once daily. 90 tablet 1    guaiFENesin  (MUCINEX) 600 mg 12 hr tablet Take 1,200 mg by mouth as needed.      hydrALAZINE (APRESOLINE) 50 MG tablet Take 1 tablet (50 mg total) by mouth every 8 (eight) hours. 270 tablet 3    multivitamin capsule Take 1 capsule by mouth once daily.      NIFEdipine (PROCARDIA-XL) 30 MG (OSM) 24 hr tablet Take 1 tablet (30 mg total) by mouth once daily. 30 tablet 11    pantoprazole (PROTONIX) 20 MG tablet Take 1 tablet (20 mg total) by mouth once daily. 30 tablet 11    polyethylene glycol (GLYCOLAX) 17 gram/dose powder Take 17 g by mouth once daily.      pregabalin (LYRICA) 50 MG capsule Take 1 capsule (50 mg total) by mouth 2 (two) times daily. 180 capsule 1    psyllium husk (METAMUCIL ORAL) Take by mouth.      RETACRIT 20,000 unit/mL injection       temazepam (RESTORIL) 30 mg capsule TAKE 1 CAPSULE AT BEDTIME 30 capsule 5    UNABLE TO FIND medication name: Stool softener (Ducolax) Laxative      vitamin E 400 UNIT capsule Take 400 Units by mouth once daily.      zolpidem (AMBIEN) 10 mg Tab Take 1 tablet (10 mg total) by mouth nightly as needed (insomnia). 90 tablet 0     Current Facility-Administered Medications on File Prior to Visit   Medication Dose Route Frequency Provider Last Rate Last Admin    acetaminophen tablet 1,000 mg  1,000 mg Oral On Call Procedure PRANAY Villalobos MD        famotidine tablet 20 mg  20 mg Oral On Call Procedure PRANAY Villalobos MD                Objective:            Physical Exam  Constitutional:       Appearance: Normal appearance.   HENT:      Head: Normocephalic and atraumatic.   Eyes:      General: No scleral icterus.     Extraocular Movements: Extraocular movements intact.      Pupils: Pupils are equal, round, and reactive to light.   Pulmonary:      Effort: Pulmonary effort is normal.      Breath sounds: No stridor.   Musculoskeletal:      Right lower leg: No edema.      Left lower leg: No edema.   Skin:     General: Skin is warm and dry.   Neurological:      General: No focal  deficit present.      Mental Status: She is alert and oriented to person, place, and time.   Psychiatric:         Mood and Affect: Mood normal.         Behavior: Behavior normal.       Assessment:       1. CKD (chronic kidney disease) stage 4, GFR 15-29 ml/min    2. Primary hypertension    3. Hyperkalemia    4. Immunosuppression    5. Other proteinuria        Plan:       1. Creatinine has remained relatively stable ranging between 2.5 and 3.0 for the past year.  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    2. Blood pressure is well controlled on current regimen.    3. Potassium was slightly elevated at 5.3.  She states she is been eating a lot of tomatoes recently.  She is going to cut back.    4. She is on 75 mg twice daily of new oral.  Levels have all been within acceptable range to minimize further progression of calcineurin toxicity.  She is status post heart transplant in 1993.    5. She has slight proteinuria about 500 mg by PC ratio.  Will continue to monitor.  She is not on a RAAS inhibitor secondary to hyperkalemia.        Gunner Melgar MD

## 2023-10-17 ENCOUNTER — INFUSION (OUTPATIENT)
Dept: INFUSION THERAPY | Facility: HOSPITAL | Age: 68
End: 2023-10-17
Attending: INTERNAL MEDICINE
Payer: MEDICARE

## 2023-10-17 VITALS
HEART RATE: 92 BPM | TEMPERATURE: 98 F | OXYGEN SATURATION: 98 % | RESPIRATION RATE: 16 BRPM | SYSTOLIC BLOOD PRESSURE: 117 MMHG | DIASTOLIC BLOOD PRESSURE: 73 MMHG

## 2023-10-17 DIAGNOSIS — M81.8 OTHER OSTEOPOROSIS WITHOUT CURRENT PATHOLOGICAL FRACTURE: ICD-10-CM

## 2023-10-17 DIAGNOSIS — N18.4 ANEMIA ASSOCIATED WITH STAGE 4 CHRONIC RENAL FAILURE: Primary | ICD-10-CM

## 2023-10-17 DIAGNOSIS — D63.1 ANEMIA ASSOCIATED WITH STAGE 4 CHRONIC RENAL FAILURE: Primary | ICD-10-CM

## 2023-10-17 PROCEDURE — 96372 THER/PROPH/DIAG INJ SC/IM: CPT | Mod: HCNC

## 2023-10-17 PROCEDURE — 63600175 PHARM REV CODE 636 W HCPCS: Mod: JZ,EC,JG,HCNC

## 2023-10-17 RX ADMIN — EPOETIN ALFA-EPBX 20000 UNITS: 20000 INJECTION, SOLUTION INTRAVENOUS; SUBCUTANEOUS at 03:10

## 2023-10-17 NOTE — NURSING
Patient here today for epo injection. Patient voices no new concerns at this time. Epo administered as documented on MAR using aseptic technique. Patient tolerated well. Band aid applied to site. Patient declined to stay for observation and denies adverse reaction with previous injections. Patient received AVS and ambulated out of treatment room. Kelsie Burk made aware patient skipped appt on 10/3 for retacrit. Patient r/s'd for 10/10 but appt for 10/17 was never adjusted to be 2 weeks after 10/10. Received orders for patient to be scheduled in 2 weeks for epo injection 10/31 and then labs and possible epo 11/14. Schedulers made aware and will contact patient.

## 2023-10-17 NOTE — DISCHARGE INSTRUCTIONS
[facility] [place]  [id number] [address]  [address]  [phone] phone     [phone] fax  Hours of Operation: Monday- Friday 8:00am- 5:00pm  After hours phone  [phone]  Hematology / Oncology Physicians on call      SALENA Arevalo Dr., NP Sydney Prescott, ONELIA Macias FNP    Please call with any concerns regarding your appointment today.

## 2023-10-25 ENCOUNTER — OFFICE VISIT (OUTPATIENT)
Dept: OBSTETRICS AND GYNECOLOGY | Facility: CLINIC | Age: 68
End: 2023-10-25
Payer: MEDICARE

## 2023-10-25 ENCOUNTER — CLINICAL SUPPORT (OUTPATIENT)
Dept: REHABILITATION | Facility: HOSPITAL | Age: 68
End: 2023-10-25
Payer: MEDICARE

## 2023-10-25 VITALS
DIASTOLIC BLOOD PRESSURE: 66 MMHG | BODY MASS INDEX: 29.05 KG/M2 | HEIGHT: 62 IN | SYSTOLIC BLOOD PRESSURE: 108 MMHG | WEIGHT: 157.88 LBS

## 2023-10-25 DIAGNOSIS — N39.41 URGE INCONTINENCE OF URINE: Primary | ICD-10-CM

## 2023-10-25 DIAGNOSIS — D05.12 DUCTAL CARCINOMA IN SITU (DCIS) OF LEFT BREAST: Primary | ICD-10-CM

## 2023-10-25 PROCEDURE — 3066F PR DOCUMENTATION OF TREATMENT FOR NEPHROPATHY: ICD-10-PCS | Mod: HCNC,CPTII,S$GLB, | Performed by: OBSTETRICS & GYNECOLOGY

## 2023-10-25 PROCEDURE — 97112 NEUROMUSCULAR REEDUCATION: CPT | Mod: HCNC,PN

## 2023-10-25 PROCEDURE — 99212 PR OFFICE/OUTPT VISIT, EST, LEVL II, 10-19 MIN: ICD-10-PCS | Mod: HCNC,24,S$GLB, | Performed by: OBSTETRICS & GYNECOLOGY

## 2023-10-25 PROCEDURE — 1126F AMNT PAIN NOTED NONE PRSNT: CPT | Mod: HCNC,CPTII,S$GLB, | Performed by: OBSTETRICS & GYNECOLOGY

## 2023-10-25 PROCEDURE — 3066F NEPHROPATHY DOC TX: CPT | Mod: HCNC,CPTII,S$GLB, | Performed by: OBSTETRICS & GYNECOLOGY

## 2023-10-25 PROCEDURE — 3074F SYST BP LT 130 MM HG: CPT | Mod: HCNC,CPTII,S$GLB, | Performed by: OBSTETRICS & GYNECOLOGY

## 2023-10-25 PROCEDURE — 3008F BODY MASS INDEX DOCD: CPT | Mod: HCNC,CPTII,S$GLB, | Performed by: OBSTETRICS & GYNECOLOGY

## 2023-10-25 PROCEDURE — 3044F HG A1C LEVEL LT 7.0%: CPT | Mod: HCNC,CPTII,S$GLB, | Performed by: OBSTETRICS & GYNECOLOGY

## 2023-10-25 PROCEDURE — 1157F ADVNC CARE PLAN IN RCRD: CPT | Mod: HCNC,CPTII,S$GLB, | Performed by: OBSTETRICS & GYNECOLOGY

## 2023-10-25 PROCEDURE — 3074F PR MOST RECENT SYSTOLIC BLOOD PRESSURE < 130 MM HG: ICD-10-PCS | Mod: HCNC,CPTII,S$GLB, | Performed by: OBSTETRICS & GYNECOLOGY

## 2023-10-25 PROCEDURE — 97110 THERAPEUTIC EXERCISES: CPT | Mod: HCNC,PN

## 2023-10-25 PROCEDURE — 3078F DIAST BP <80 MM HG: CPT | Mod: HCNC,CPTII,S$GLB, | Performed by: OBSTETRICS & GYNECOLOGY

## 2023-10-25 PROCEDURE — 3044F PR MOST RECENT HEMOGLOBIN A1C LEVEL <7.0%: ICD-10-PCS | Mod: HCNC,CPTII,S$GLB, | Performed by: OBSTETRICS & GYNECOLOGY

## 2023-10-25 PROCEDURE — 99999 PR PBB SHADOW E&M-EST. PATIENT-LVL IV: ICD-10-PCS | Mod: PBBFAC,HCNC,, | Performed by: OBSTETRICS & GYNECOLOGY

## 2023-10-25 PROCEDURE — 1157F PR ADVANCE CARE PLAN OR EQUIV PRESENT IN MEDICAL RECORD: ICD-10-PCS | Mod: HCNC,CPTII,S$GLB, | Performed by: OBSTETRICS & GYNECOLOGY

## 2023-10-25 PROCEDURE — 1126F PR PAIN SEVERITY QUANTIFIED, NO PAIN PRESENT: ICD-10-PCS | Mod: HCNC,CPTII,S$GLB, | Performed by: OBSTETRICS & GYNECOLOGY

## 2023-10-25 PROCEDURE — 99212 OFFICE O/P EST SF 10 MIN: CPT | Mod: HCNC,24,S$GLB, | Performed by: OBSTETRICS & GYNECOLOGY

## 2023-10-25 PROCEDURE — 99999 PR PBB SHADOW E&M-EST. PATIENT-LVL IV: CPT | Mod: PBBFAC,HCNC,, | Performed by: OBSTETRICS & GYNECOLOGY

## 2023-10-25 PROCEDURE — 3008F PR BODY MASS INDEX (BMI) DOCUMENTED: ICD-10-PCS | Mod: HCNC,CPTII,S$GLB, | Performed by: OBSTETRICS & GYNECOLOGY

## 2023-10-25 PROCEDURE — 1159F PR MEDICATION LIST DOCUMENTED IN MEDICAL RECORD: ICD-10-PCS | Mod: HCNC,CPTII,S$GLB, | Performed by: OBSTETRICS & GYNECOLOGY

## 2023-10-25 PROCEDURE — 3078F PR MOST RECENT DIASTOLIC BLOOD PRESSURE < 80 MM HG: ICD-10-PCS | Mod: HCNC,CPTII,S$GLB, | Performed by: OBSTETRICS & GYNECOLOGY

## 2023-10-25 PROCEDURE — 1159F MED LIST DOCD IN RCRD: CPT | Mod: HCNC,CPTII,S$GLB, | Performed by: OBSTETRICS & GYNECOLOGY

## 2023-10-25 RX ORDER — ERGOCALCIFEROL 1.25 MG/1
50000 CAPSULE ORAL
COMMUNITY

## 2023-10-25 NOTE — PROGRESS NOTES
"  Subjective:       Patient ID: Nadia Damon is a 68 y.o. female.    Chief Complaint:  Consult      History of Present Illness  HPI  More than 8 weeks post robotic colposacropexy   Continues to have problem at night controlling release of urine when the bladder fills     Health Maintenance   Topic Date Due    Mammogram  04/10/2024    Lipid Panel  2024    DEXA Scan  2026    Colorectal Cancer Screening  2026    TETANUS VACCINE  2030    Hepatitis C Screening  Completed    Shingles Vaccine  Completed     GYN & OB History  Patient's last menstrual period was 1983 (within years).   Date of Last Pap: No result found    OB History    Para Term  AB Living   2 0 0     2   SAB IAB Ectopic Multiple Live Births           2      # Outcome Date GA Lbr Tirso/2nd Weight Sex Delivery Anes PTL Lv   2       Vag-Spont      1       Vag-Spont          Review of Systems  Review of Systems        Objective:   /66   Ht 5' 2" (1.575 m)   Wt 71.6 kg (157 lb 13.6 oz)   LMP 1983 (Within Years)   BMI 28.87 kg/m²    Physical Exam     Assessment:        1. Urge incontinence of urine                Plan:            Nadia was seen today for consult.    Diagnoses and all orders for this visit:    Urge incontinence of urine  -     Ambulatory referral/consult to Urology; Future        "

## 2023-10-25 NOTE — PROGRESS NOTES
Physical Therapy/Lymphedema Daily Treatment Note     Name: Nadia Damon  Clinic Number: [id number]    Therapy Diagnosis:   Encounter Diagnosis   Name Primary?    Ductal carcinoma in situ (DCIS) of left breast Yes     Physician: Kristine Delgado NP    Visit Date: 10/25/2023    Physician Orders: PT Eval and Treat   Medical Diagnosis: chronic left shoulder pain  Evaluation Date: 4/11/2023  Authorization Period Expiration: 12/31/2023  Progress Note due: 11/13/2023  Plan of Care Expiration: 12/22/2023  Visit #/Visits authorized: 9/20      Time In: 2:30 pm  Time Out: 3:23 pm  Total Billable Time: 53 minutes    Precautions: Standard    Subjective     Pt reports: doing much better and with very minimal pain in the left axilla  She was compliant with home exercise program.  Response to previous treatment: good  Functional change: none at this time    Pain: 1/10  Location: left pec muscle     Objective     Objective Measures updated at progress report unless specified      CMS Impairment/Limitation/Restriction for FOTO shoulder Survey    Therapist reviewed FOTO scores for Nadia Damon on 10/10/2023.   FOTO documents entered into [employer] - see Media section.    Limitation Score: 66% (eval), 50% (2nd), 53% (3rd)         Treatment:     Nadia received the following self care and home management x -- minutes      Nadia received therapeutic exercises to develop strength, ROM, flexibility, and posture for 25 minutes including:   THERAPEUTIC EXERCISES:  Continue HEP of AROM, stretching, and postural correction.     Intervention Performed Today  COVID FATIGUE   Supine overhead flexion   AAROM   Supine chicken wing/cactus arms      Open books/windmills   standing    Shoulder abduction + flexion  Cookie sheet with red band   Modified child's pose      Cat cow stretch      Quadruped rock backs      Child's pose      Reach and thread the needle      Shoulder abduction overhead     Wall lift offs  With red band   SA presses    Modified plank at table   Elliptical x 2 minutes   UBE x 2 min forward, 1.34 min backward   Matrix rows x 20# 1x10   Matrix chest press x 10# 2 x 10   Matrix triceps  50# x 25   Matrix biceps ---- 10# x 3 (difficult)      Plan for Next Visit: Will receive soft tissue mobilization to left shoulder and updated exercises prn for the left shoulder motion and strengthening for bilateral upper extremities/shoulders        Nadia participated in neuromuscular re-education activities to improve: Kinesthetic and Sense for 33 minutes. The following activities were included:    Intervention Performed Today With verbal and tactile cues to avoid using compensatory muscles   TA activation with and w/o upper extremity motions x With stability ball;  with yellow band   Posterior pelvic tilt + TA with and w/o upper extremity motions  With stability ball;  with yellow band   Static shoulder external rotation with chest press in supine x With red band   Seated TA activation, posterior pelvic tilt and pelvic floor lift x With and without marching   Seated rows with resistance band   Yellow band with cues to avoid using trap muscles   Bridge with stability ball x                Plan for Next Visit: due to hx of bladder sx and general weakness        Nadia received the following manual therapy techniques applied for (--) minutes, including:      Intervention Performed Today     Cervical soft tissue mobilization      Upper trap soft tissue mobilization      Clavicular, intercostal soft tissue mobilization      Pec muscle soft tissue mobilization/release     Subscapularis soft tissue mobilization/release     Latissimus dors muscle soft tissue mobilization      Rhomboids soft tissue mobilization      Axillary Web Syndrome release techniques    instructed in self release techinques              Plan for Next Visit: manual therapy as needed; AXILLARY CORD RELEASE         Home Exercises Provided and Patient Education Provided:   See  self care section above    Written Home Exercises Provided: yes.   Exercises were reviewed and Nadia was able to demonstrate them prior to the end of the session.  Nadia demonstrated good  understanding of the education provided.     Assessment     Nadia was feeling much better today and pain was rated at a 1/10 in the left axilla. She did not feel like she needed manual therapy today so her session emphasized core and upper extremity strengthening. She fatigued easily requiring multiple rest breaks.  Pt prognosis is Excellent.     Pt will continue to benefit from skilled outpatient physical therapy to address the deficits listed in the problem list box on initial evaluation, provide pt/family education and to maximize pt's level of independence in the home and community environment.     Pt's spiritual, cultural and educational needs considered and pt agreeable to plan of care and goals.     Anticipated barriers to physical therapy: none    Goals:     Short Term goals: 6 weeks  1. Patient will demonstrate 100% understanding of lymphedema risk reduction practices to include self monitoring for lymphedema. (GOAL MET)  2. Patient will demonstrate independence with Home Exercise program established. (progressing, not met)  3. Pt will increase AROM/PROM in shoulder abduction ROM to 130 degrees on left to improve functional reach, carry, push, pull pain free. (GOAL MET)  4. Pt will increase AROM/PROM in shoulder flexion to 115 degrees on right to improve functional reach, carry, push, pull pain free.(GOAL MET)        Long Term Goals: 10 weeks   1.  Pt will increase AROM/PROM in shoulder flexion to 170 degrees on right to improve functional reach, carry, push, pull pain free. (GOAL MET)  2. Pt will increase strength to 4+/5 in gross UE musculature to improve tolerance to all functional activities pain free. (progressing, not met)  3. Pt will demonstrate full/maximized tissue mobility to increase ROM and promote healthy  tissue to be pain free at discharge. (progressing, not met)  4. Pt will report decrease in overall worst pain to 2/10 at discharge. (progressing, not met)  5. Pt will increase AROM/PROM in shoulder abduction ROM to 170 degrees on right to improve functional reach, carry, push, pull pain free. (GOAL MET)  6. Patient will report compliance with walking program 5x week for 20 min each day to improve overall cardiovascular function and decrease cancer related fatigue at discharge. (progressing, not met)        Plan   Outpatient physical therapy 1-2x week for 10 weeks to include the following:   Manual Therapy, Patient Education, Self Care, Therapeutic Activities, and Therapeutic Exercise.     Plan of care Certification: 10/13/2023 to 12/22/2023        Cornelia Tian, PT

## 2023-10-31 ENCOUNTER — INFUSION (OUTPATIENT)
Dept: INFUSION THERAPY | Facility: HOSPITAL | Age: 68
End: 2023-10-31
Attending: INTERNAL MEDICINE
Payer: MEDICARE

## 2023-10-31 ENCOUNTER — CLINICAL SUPPORT (OUTPATIENT)
Dept: REHABILITATION | Facility: HOSPITAL | Age: 68
End: 2023-10-31
Payer: MEDICARE

## 2023-10-31 VITALS
RESPIRATION RATE: 18 BRPM | TEMPERATURE: 98 F | OXYGEN SATURATION: 100 % | HEART RATE: 95 BPM | DIASTOLIC BLOOD PRESSURE: 82 MMHG | SYSTOLIC BLOOD PRESSURE: 139 MMHG

## 2023-10-31 DIAGNOSIS — D63.1 ANEMIA ASSOCIATED WITH STAGE 4 CHRONIC RENAL FAILURE: Primary | ICD-10-CM

## 2023-10-31 DIAGNOSIS — D05.12 DUCTAL CARCINOMA IN SITU (DCIS) OF LEFT BREAST: Primary | ICD-10-CM

## 2023-10-31 DIAGNOSIS — M81.8 OTHER OSTEOPOROSIS WITHOUT CURRENT PATHOLOGICAL FRACTURE: ICD-10-CM

## 2023-10-31 DIAGNOSIS — N18.4 ANEMIA ASSOCIATED WITH STAGE 4 CHRONIC RENAL FAILURE: Primary | ICD-10-CM

## 2023-10-31 PROCEDURE — 63600175 PHARM REV CODE 636 W HCPCS: Mod: JZ,EC,JG,HCNC

## 2023-10-31 PROCEDURE — 96372 THER/PROPH/DIAG INJ SC/IM: CPT | Mod: HCNC

## 2023-10-31 PROCEDURE — 97140 MANUAL THERAPY 1/> REGIONS: CPT | Mod: HCNC,PN

## 2023-10-31 PROCEDURE — 97530 THERAPEUTIC ACTIVITIES: CPT | Mod: HCNC,PN

## 2023-10-31 RX ADMIN — EPOETIN ALFA-EPBX 20000 UNITS: 20000 INJECTION, SOLUTION INTRAVENOUS; SUBCUTANEOUS at 02:10

## 2023-10-31 NOTE — PROGRESS NOTES
Physical Therapy/Lymphedema Daily Treatment Note     Name: Nadia Damon  Clinic Number: [id number]    Therapy Diagnosis:   Encounter Diagnosis   Name Primary?    Ductal carcinoma in situ (DCIS) of left breast Yes       Physician: Kristine Delgado NP    Visit Date: 10/31/2023    Physician Orders: PT Eval and Treat   Medical Diagnosis: chronic left shoulder pain  Evaluation Date: 4/11/2023  Authorization Period Expiration: 12/31/2023  Progress Note due: 11/13/2023  Plan of Care Expiration: 12/22/2023  Visit #/Visits authorized: 10/20      Time In: 1:25 pm  Time Out: 2:21 pm  Total Billable Time: 56 minutes    Precautions: Standard    Subjective     Pt reports: having discomfort in the left pec muscle area today  She was compliant with home exercise program.  Response to previous treatment: good  Functional change: none at this time    Pain: 5/10  Location: left pec muscle     Objective     Objective Measures updated at progress report unless specified      CMS Impairment/Limitation/Restriction for FOTO shoulder Survey    Therapist reviewed FOTO scores for Nadia Damon on 10/10/2023.   FOTO documents entered into [employer] - see Media section.    Limitation Score: 66% (eval), 50% (2nd), 53% (3rd)         Treatment:     Nadia received the following self care and home management x -- minutes      Nadia received therapeutic activities x 33 minutes:    Intervention Performed Today  to improve ability to reach up and behind her and to carry objects   Wall lift offs x Unilateral and bilateral   Wall clocks with band x Left upper extremity with red band   Biceps curl with band x Red band   Wall slides + lift off x Static shoulder external rotation + shoulder flexion + shoulder hyper extension; yellow and red band                           Plan for Next Visit: improve reaching and carrying       Nadia received therapeutic exercises to develop strength, ROM, flexibility, and posture for -- minutes including:   THERAPEUTIC  EXERCISES:  Continue HEP of AROM, stretching, and postural correction.     Intervention Performed Today  COVID FATIGUE   Supine overhead flexion   AAROM   Supine chicken wing/cactus arms      Open books/windmills   standing    Shoulder abduction + flexion  Cookie sheet with red band   Modified child's pose      Cat cow stretch      Quadruped rock backs      Child's pose      Reach and thread the needle      Shoulder abduction overhead     SA presses   Modified plank at table   Elliptical  2 minutes   UBE  2 min forward, 1.34 min backward   Matrix rows  20# 1x10   Matrix chest press  10# 2 x 10   Matrix triceps  50# x 25   Matrix biceps ---- 10# x 3 (difficult)      Plan for Next Visit: Will receive soft tissue mobilization to left shoulder and updated exercises prn for the left shoulder motion and strengthening for bilateral upper extremities/shoulders        Nadia participated in neuromuscular re-education activities to improve: Kinesthetic and Sense for -- minutes. The following activities were included:    Intervention Performed Today With verbal and tactile cues to avoid using compensatory muscles   TA activation with and w/o upper extremity motions  With stability ball;  with yellow band   Posterior pelvic tilt + TA with and w/o upper extremity motions  With stability ball;  with yellow band   Static shoulder external rotation with chest press in supine  With red band   Seated TA activation, posterior pelvic tilt and pelvic floor lift  With and without marching   Seated rows with resistance band   Yellow band with cues to avoid using trap muscles   Bridge with stability ball                 Plan for Next Visit: due to hx of bladder sx and general weakness        Nadia received the following manual therapy techniques applied for (23) minutes, including:      Intervention Performed Today     Cervical soft tissue mobilization      Upper trap soft tissue mobilization  x    Clavicular, intercostal soft  tissue mobilization  x    Pec muscle soft tissue mobilization/release x    Subscapularis soft tissue mobilization/release     Latissimus dors muscle soft tissue mobilization      Rhomboids soft tissue mobilization      Axillary Web Syndrome release techniques    instructed in self release techinques              Plan for Next Visit: manual therapy as needed; AXILLARY CORD RELEASE         Home Exercises Provided and Patient Education Provided:   See self care section above    Written Home Exercises Provided: yes.   Exercises were reviewed and Nadia was able to demonstrate them prior to the end of the session.  Nadia demonstrated good  understanding of the education provided.     Assessment     Nadia had an increase in pec muscle area. She responded well to manual therapy. Improving reaching for left upper extremity was today's focus. She did well with these activities using a yellow and red resistance band  Pt prognosis is Excellent.     Pt will continue to benefit from skilled outpatient physical therapy to address the deficits listed in the problem list box on initial evaluation, provide pt/family education and to maximize pt's level of independence in the home and community environment.     Pt's spiritual, cultural and educational needs considered and pt agreeable to plan of care and goals.     Anticipated barriers to physical therapy: none    Goals:     Short Term goals: 6 weeks  1. Patient will demonstrate 100% understanding of lymphedema risk reduction practices to include self monitoring for lymphedema. (GOAL MET)  2. Patient will demonstrate independence with Home Exercise program established. (progressing, not met)  3. Pt will increase AROM/PROM in shoulder abduction ROM to 130 degrees on left to improve functional reach, carry, push, pull pain free. (GOAL MET)  4. Pt will increase AROM/PROM in shoulder flexion to 115 degrees on right to improve functional reach, carry, push, pull pain free.(GOAL MET)         Long Term Goals: 10 weeks   1.  Pt will increase AROM/PROM in shoulder flexion to 170 degrees on right to improve functional reach, carry, push, pull pain free. (GOAL MET)  2. Pt will increase strength to 4+/5 in gross UE musculature to improve tolerance to all functional activities pain free. (progressing, not met)  3. Pt will demonstrate full/maximized tissue mobility to increase ROM and promote healthy tissue to be pain free at discharge. (progressing, not met)  4. Pt will report decrease in overall worst pain to 2/10 at discharge. (progressing, not met)  5. Pt will increase AROM/PROM in shoulder abduction ROM to 170 degrees on right to improve functional reach, carry, push, pull pain free. (GOAL MET)  6. Patient will report compliance with walking program 5x week for 20 min each day to improve overall cardiovascular function and decrease cancer related fatigue at discharge. (progressing, not met)        Plan   Outpatient physical therapy 1-2x week for 10 weeks to include the following:   Manual Therapy, Patient Education, Self Care, Therapeutic Activities, and Therapeutic Exercise.     Plan of care Certification: 10/13/2023 to 12/22/2023        Cornelia Tian, PT

## 2023-11-01 DIAGNOSIS — Z94.1 HEART TRANSPLANTED: Chronic | ICD-10-CM

## 2023-11-01 DIAGNOSIS — G47.00 INSOMNIA, UNSPECIFIED TYPE: ICD-10-CM

## 2023-11-01 RX ORDER — TEMAZEPAM 30 MG/1
30 CAPSULE ORAL NIGHTLY PRN
Qty: 30 CAPSULE | Refills: 5 | Status: SHIPPED | OUTPATIENT
Start: 2023-11-01 | End: 2024-03-07

## 2023-11-01 RX ORDER — CYCLOSPORINE 25 MG/1
75 CAPSULE, LIQUID FILLED ORAL 2 TIMES DAILY
Qty: 540 CAPSULE | Refills: 1 | Status: SHIPPED | OUTPATIENT
Start: 2023-11-01 | End: 2024-04-29

## 2023-11-02 ENCOUNTER — OFFICE VISIT (OUTPATIENT)
Dept: PRIMARY CARE CLINIC | Facility: CLINIC | Age: 68
End: 2023-11-02
Payer: MEDICARE

## 2023-11-02 ENCOUNTER — OFFICE VISIT (OUTPATIENT)
Dept: UROLOGY | Facility: CLINIC | Age: 68
End: 2023-11-02
Payer: MEDICARE

## 2023-11-02 VITALS
DIASTOLIC BLOOD PRESSURE: 64 MMHG | BODY MASS INDEX: 29.42 KG/M2 | HEIGHT: 62 IN | OXYGEN SATURATION: 98 % | TEMPERATURE: 99 F | SYSTOLIC BLOOD PRESSURE: 126 MMHG | WEIGHT: 159.88 LBS | HEART RATE: 94 BPM

## 2023-11-02 VITALS
RESPIRATION RATE: 18 BRPM | BODY MASS INDEX: 29.21 KG/M2 | DIASTOLIC BLOOD PRESSURE: 85 MMHG | SYSTOLIC BLOOD PRESSURE: 156 MMHG | WEIGHT: 159.75 LBS | HEART RATE: 92 BPM

## 2023-11-02 DIAGNOSIS — N39.41 URGE INCONTINENCE OF URINE: ICD-10-CM

## 2023-11-02 DIAGNOSIS — E03.9 ACQUIRED HYPOTHYROIDISM: ICD-10-CM

## 2023-11-02 DIAGNOSIS — D63.1 ANEMIA ASSOCIATED WITH STAGE 4 CHRONIC RENAL FAILURE: Chronic | ICD-10-CM

## 2023-11-02 DIAGNOSIS — N18.4 ANEMIA ASSOCIATED WITH STAGE 4 CHRONIC RENAL FAILURE: Chronic | ICD-10-CM

## 2023-11-02 DIAGNOSIS — I50.32 CHRONIC DIASTOLIC (CONGESTIVE) HEART FAILURE: ICD-10-CM

## 2023-11-02 DIAGNOSIS — N25.81 SECONDARY HYPERPARATHYROIDISM, RENAL: Chronic | ICD-10-CM

## 2023-11-02 PROBLEM — R94.6 ABNORMAL THYROID FUNCTION TEST: Status: RESOLVED | Noted: 2022-05-16 | Resolved: 2023-11-02

## 2023-11-02 PROBLEM — J02.9 PHARYNGITIS: Status: RESOLVED | Noted: 2019-01-09 | Resolved: 2023-11-02

## 2023-11-02 PROBLEM — K11.21 PAROTITIS, ACUTE: Status: RESOLVED | Noted: 2023-09-19 | Resolved: 2023-11-02

## 2023-11-02 PROCEDURE — 3288F PR FALLS RISK ASSESSMENT DOCUMENTED: ICD-10-PCS | Mod: HCNC,CPTII,S$GLB, | Performed by: NURSE PRACTITIONER

## 2023-11-02 PROCEDURE — G0008 FLU VACCINE - QUADRIVALENT - ADJUVANTED: ICD-10-PCS | Mod: HCNC,S$GLB,, | Performed by: NURSE PRACTITIONER

## 2023-11-02 PROCEDURE — 90694 FLU VACCINE - QUADRIVALENT - ADJUVANTED: ICD-10-PCS | Mod: HCNC,S$GLB,, | Performed by: NURSE PRACTITIONER

## 2023-11-02 PROCEDURE — 3066F PR DOCUMENTATION OF TREATMENT FOR NEPHROPATHY: ICD-10-PCS | Mod: HCNC,CPTII,S$GLB, | Performed by: NURSE PRACTITIONER

## 2023-11-02 PROCEDURE — 1101F PT FALLS ASSESS-DOCD LE1/YR: CPT | Mod: HCNC,CPTII,S$GLB, | Performed by: NURSE PRACTITIONER

## 2023-11-02 PROCEDURE — 3288F FALL RISK ASSESSMENT DOCD: CPT | Mod: HCNC,CPTII,S$GLB, | Performed by: NURSE PRACTITIONER

## 2023-11-02 PROCEDURE — 3074F SYST BP LT 130 MM HG: CPT | Mod: HCNC,CPTII,S$GLB, | Performed by: NURSE PRACTITIONER

## 2023-11-02 PROCEDURE — 3008F BODY MASS INDEX DOCD: CPT | Mod: HCNC,CPTII,S$GLB, | Performed by: NURSE PRACTITIONER

## 2023-11-02 PROCEDURE — 3008F PR BODY MASS INDEX (BMI) DOCUMENTED: ICD-10-PCS | Mod: HCNC,CPTII,S$GLB, | Performed by: NURSE PRACTITIONER

## 2023-11-02 PROCEDURE — 3078F DIAST BP <80 MM HG: CPT | Mod: HCNC,CPTII,S$GLB, | Performed by: NURSE PRACTITIONER

## 2023-11-02 PROCEDURE — 3066F NEPHROPATHY DOC TX: CPT | Mod: HCNC,CPTII,S$GLB, | Performed by: NURSE PRACTITIONER

## 2023-11-02 PROCEDURE — 3044F HG A1C LEVEL LT 7.0%: CPT | Mod: HCNC,CPTII,S$GLB, | Performed by: NURSE PRACTITIONER

## 2023-11-02 PROCEDURE — 99999 PR PBB SHADOW E&M-EST. PATIENT-LVL IV: CPT | Mod: PBBFAC,HCNC,, | Performed by: NURSE PRACTITIONER

## 2023-11-02 PROCEDURE — 1157F PR ADVANCE CARE PLAN OR EQUIV PRESENT IN MEDICAL RECORD: ICD-10-PCS | Mod: HCNC,CPTII,S$GLB, | Performed by: NURSE PRACTITIONER

## 2023-11-02 PROCEDURE — 3077F PR MOST RECENT SYSTOLIC BLOOD PRESSURE >= 140 MM HG: ICD-10-PCS | Mod: HCNC,CPTII,S$GLB, | Performed by: NURSE PRACTITIONER

## 2023-11-02 PROCEDURE — 87186 SC STD MICRODIL/AGAR DIL: CPT | Mod: HCNC | Performed by: NURSE PRACTITIONER

## 2023-11-02 PROCEDURE — 1160F PR REVIEW ALL MEDS BY PRESCRIBER/CLIN PHARMACIST DOCUMENTED: ICD-10-PCS | Mod: HCNC,CPTII,S$GLB, | Performed by: NURSE PRACTITIONER

## 2023-11-02 PROCEDURE — 1101F PR PT FALLS ASSESS DOC 0-1 FALLS W/OUT INJ PAST YR: ICD-10-PCS | Mod: HCNC,CPTII,S$GLB, | Performed by: NURSE PRACTITIONER

## 2023-11-02 PROCEDURE — 1159F MED LIST DOCD IN RCRD: CPT | Mod: HCNC,CPTII,S$GLB, | Performed by: NURSE PRACTITIONER

## 2023-11-02 PROCEDURE — 1126F AMNT PAIN NOTED NONE PRSNT: CPT | Mod: HCNC,CPTII,S$GLB, | Performed by: NURSE PRACTITIONER

## 2023-11-02 PROCEDURE — 1157F ADVNC CARE PLAN IN RCRD: CPT | Mod: HCNC,CPTII,S$GLB, | Performed by: NURSE PRACTITIONER

## 2023-11-02 PROCEDURE — 3044F PR MOST RECENT HEMOGLOBIN A1C LEVEL <7.0%: ICD-10-PCS | Mod: HCNC,CPTII,S$GLB, | Performed by: NURSE PRACTITIONER

## 2023-11-02 PROCEDURE — G0008 ADMIN INFLUENZA VIRUS VAC: HCPCS | Mod: HCNC,S$GLB,, | Performed by: NURSE PRACTITIONER

## 2023-11-02 PROCEDURE — 1126F PR PAIN SEVERITY QUANTIFIED, NO PAIN PRESENT: ICD-10-PCS | Mod: HCNC,CPTII,S$GLB, | Performed by: NURSE PRACTITIONER

## 2023-11-02 PROCEDURE — 3077F SYST BP >= 140 MM HG: CPT | Mod: HCNC,CPTII,S$GLB, | Performed by: NURSE PRACTITIONER

## 2023-11-02 PROCEDURE — 3078F PR MOST RECENT DIASTOLIC BLOOD PRESSURE < 80 MM HG: ICD-10-PCS | Mod: HCNC,CPTII,S$GLB, | Performed by: NURSE PRACTITIONER

## 2023-11-02 PROCEDURE — 1159F PR MEDICATION LIST DOCUMENTED IN MEDICAL RECORD: ICD-10-PCS | Mod: HCNC,CPTII,S$GLB, | Performed by: NURSE PRACTITIONER

## 2023-11-02 PROCEDURE — 87086 URINE CULTURE/COLONY COUNT: CPT | Mod: HCNC | Performed by: NURSE PRACTITIONER

## 2023-11-02 PROCEDURE — 1160F RVW MEDS BY RX/DR IN RCRD: CPT | Mod: HCNC,CPTII,S$GLB, | Performed by: NURSE PRACTITIONER

## 2023-11-02 PROCEDURE — 99213 OFFICE O/P EST LOW 20 MIN: CPT | Mod: HCNC,S$GLB,, | Performed by: NURSE PRACTITIONER

## 2023-11-02 PROCEDURE — 99999 PR PBB SHADOW E&M-EST. PATIENT-LVL IV: ICD-10-PCS | Mod: PBBFAC,HCNC,, | Performed by: NURSE PRACTITIONER

## 2023-11-02 PROCEDURE — 3079F PR MOST RECENT DIASTOLIC BLOOD PRESSURE 80-89 MM HG: ICD-10-PCS | Mod: HCNC,CPTII,S$GLB, | Performed by: NURSE PRACTITIONER

## 2023-11-02 PROCEDURE — 99213 PR OFFICE/OUTPT VISIT, EST, LEVL III, 20-29 MIN: ICD-10-PCS | Mod: HCNC,S$GLB,, | Performed by: NURSE PRACTITIONER

## 2023-11-02 PROCEDURE — 87077 CULTURE AEROBIC IDENTIFY: CPT | Mod: HCNC | Performed by: NURSE PRACTITIONER

## 2023-11-02 PROCEDURE — 3074F PR MOST RECENT SYSTOLIC BLOOD PRESSURE < 130 MM HG: ICD-10-PCS | Mod: HCNC,CPTII,S$GLB, | Performed by: NURSE PRACTITIONER

## 2023-11-02 PROCEDURE — 99214 OFFICE O/P EST MOD 30 MIN: CPT | Mod: HCNC,S$GLB,, | Performed by: NURSE PRACTITIONER

## 2023-11-02 PROCEDURE — 90694 VACC AIIV4 NO PRSRV 0.5ML IM: CPT | Mod: HCNC,S$GLB,, | Performed by: NURSE PRACTITIONER

## 2023-11-02 PROCEDURE — 87088 URINE BACTERIA CULTURE: CPT | Mod: HCNC | Performed by: NURSE PRACTITIONER

## 2023-11-02 PROCEDURE — 99214 PR OFFICE/OUTPT VISIT, EST, LEVL IV, 30-39 MIN: ICD-10-PCS | Mod: HCNC,S$GLB,, | Performed by: NURSE PRACTITIONER

## 2023-11-02 PROCEDURE — 3079F DIAST BP 80-89 MM HG: CPT | Mod: HCNC,CPTII,S$GLB, | Performed by: NURSE PRACTITIONER

## 2023-11-02 RX ORDER — MIRABEGRON 50 MG/1
50 TABLET, FILM COATED, EXTENDED RELEASE ORAL DAILY
Qty: 30 TABLET | Refills: 11 | Status: SHIPPED | OUTPATIENT
Start: 2023-11-02 | End: 2023-11-02 | Stop reason: SDUPTHER

## 2023-11-02 RX ORDER — MIRABEGRON 50 MG/1
50 TABLET, FILM COATED, EXTENDED RELEASE ORAL DAILY
Qty: 30 TABLET | Refills: 11 | Status: SHIPPED | OUTPATIENT
Start: 2023-11-02 | End: 2023-11-16

## 2023-11-02 NOTE — ASSESSMENT & PLAN NOTE
Lab Results   Component Value Date    HGB 9.6 (L) 10/16/2023   Followed by hematology. Receiving epo as indicated.

## 2023-11-02 NOTE — PROGRESS NOTES
Chief Complaint:   Urinary incontinence, urge greater than stress  Overactive bladder    HPI:   Patient is a 68-year-old female that is presenting with an increase in mixed incontinence, urge greater than stress, nocturia and daytime frequency.  Patient is status post Robotic sacral colpopexy.Denies gross hematuria urine in clinic indicates nitrates and leukocytes, all other parameters are negative PVR is 40 mL.  Denies pelvic or flank pain.  Denies dysuria or other urinary tract infection symptoms.  Was seen by Dr. Cochran after urodynamic studies, his recommendation based on results was to start anticholinergic or other overactive bladder medication.    Dimas MONTERROSO  Procedures  05/25/2023     Procedure: Urodynamics     HPI:   5/25/23- here today for urodynamics.  This is a referral from Gynecology.     Procedure in Detail: Pt voided and a PVR was measured with straight cath using sterile technique. Urodynamics catheters were placed in bladder and rectum. EMG pads were placed. The patient was positioned on the urinal over the CMG cup. The test was performed for pressure-flow studies of storage and voiding function. Valsalva was performed at intervals to evaluate catheter function and detrusor response/incontinence. PVR was again measured after the patient voided. The catheters were withdrawn and the patient allowed to dress.        Urodynamics:     Filling Phase:  Resting Pressure- 0 cm/H2O  First sensation- 67 ml  First desire- 221 ml  Strong desire- 298 ml  Capacity- 301 ml  Uninhibited contractions/detrusor instability- Noted, small leaks.  Valsalva demonstrates small leak.  No loss of compliance.     Voiding Phase:  Max flow rate- 10 ml/sec  Voided volume- 163 ml  Residual- 10 ml  Intraabdominal pressure- 27 cm/H2O   Peak detrusor pressure- 17 cm/H2O  Peak flow pressure- 15 cm/H2O  Normal detrusor pressure shape.  EMG normal.        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  Normal bladder capacity, no evidence of residual urine with no loss of compliance.  Patient had coordinated bladder contractions.  Low pressure voiding with low flow rate, therefore no evidence of obstructive voiding.       In conclusion patient has evidence of stress incontinence, possible benefit from surgical management.  Patient also has evidence of detrusor instability, would recommend anticholinergics especially prior to surgical management to reduce risk of exacerbation after surgery.    Allergies:  Lisinopril, Augmentin [amoxicillin-pot clavulanate], and Zyvox [linezolid]    Medications:  has a current medication list which includes the following prescription(s): biotin, cyclosporine modified (neoral), ergocalciferol, evening primrose oil, furosemide, guaifenesin, hydralazine, multivitamin, nifedipine, pantoprazole, polyethylene glycol, pregabalin, psyllium husk, retacrit, temazepam, UNABLE TO FIND, vitamin e, zolpidem, calcitonin (salmon), and carvedilol, and the following Facility-Administered Medications: acetaminophen and famotidine.    Review of Systems:  General: No fever, chills, fatigability, or weight loss.  Skin: No rashes, itching, or changes in color or texture of skin.  Chest: Denies DONOHUE, cyanosis, wheezing, cough, and sputum production.  Abdomen: Appetite fine. No weight loss. Denies diarrhea, abdominal pain, hematemesis, or blood in stool.  Musculoskeletal: No joint stiffness or swelling. Denies back pain.  : As above.  All other review of systems negative.    PMH:   has a past medical history of Abdominal wall hernia, Abnormal mammogram (10/12/2021), GRACE (acute kidney injury) (11/22/2021), Anxiety, Arthritis, Breast cancer in female (08/2021), C. 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    PSH:   has a past surgical history that includes Cardiac pacemaker removal (Left, 06/26/2014); Bladder surgery (2015 approx); Carpal tunnel release (Left, 03/03/2015); Hysterectomy (1983); Hernia repair (Right, 1971 approx); Breast surgery (Left, 09/28/2015); Breast surgery (Right, 12/2015); Toe Surgery; Colonoscopy (N/A, 02/25/2021); Breast biopsy (Bilateral); Heart transplant (1993); [facility] lymph node biopsy (Left, 10/12/2021); Insertion of tunneled central venous catheter (CVC) with subcutaneous port (N/A, 11/09/2021); Incision and drainage of abscess (Left, 12/24/2021); Removal of vascular access port; Breast lumpectomy (Left, 2021); Injection of anesthetic agent into sacroiliac joint (Right, 08/22/2022); Injection of anesthetic agent around medial branch nerves innervating lumbar facet joint (Right, 10/19/2022); Injection of anesthetic agent around medial branch nerves innervating lumbar facet joint (Right, 11/09/2022); Breast biopsy (Right, 10/31/2022); Radiofrequency thermocoagulation (Right, 12/07/2022); Epidural steroid injection into cervical spine (N/A, 02/02/2023); Cystocele repair; Robot-assisted laparoscopic abdominal sacrocolpopexy (N/A, 8/10/2023);  xi robotic urethropexy (N/A, 8/10/2023); and Robot-assisted laparoscopic oophorectomy (Right, 8/10/2023).    FamHx: family history includes Breast cancer in her maternal grandmother and mother; Cancer (age of onset: 38) in her mother; Cataracts in her cousin; Heart disease in her maternal grandmother; Hypertension in her son.    SocHx:  reports that she has never smoked. She has never used smokeless tobacco. She reports that she does not drink alcohol and does not use drugs.      Physical Exam:  Vitals:    11/02/23 0827   BP: (!) 156/85   Pulse: 92   Resp: 18     General: A&Ox3, no apparent distress, no deformities  Neck: No masses, normal thyroid  Lungs: normal inspiration, no use of accessory muscles  Heart: normal pulse, no arrhythmias  Abdomen: Soft, NT, ND, no masses, no hernias, no hepatosplenomegaly  Lymphatic: Neck and groin nodes negative    Labs/Studies:   See HPI    Impression/Plan:   1. UTI  Urine in clinic indicates the possibility of urinary tract infection, patient is asymptomatic.  Urine will be sent for culture and she will be treated based on final urine culture results secondary asymptomatic.    2. Overactive bladder  Patient was educated on behavior modifications needed to decrease overactive bladder symptoms.  Patient was prescribed Myrbetriq and will return to clinic in 4 weeks for re-evaluation.

## 2023-11-02 NOTE — PROGRESS NOTES
Nadia Damon  11/02/2023  [id number]    Elis Wick MD  Patient Care Team:  Elis Wick MD as PCP - General (Internal Medicine)  Miguel Soni Jr., MD as Consulting Physician (Vascular Surgery)  Ike King MD as Consulting Physician (Cardiology)  Courtney Tubbs MD as Consulting Physician (Cardiology)  Carter Crawford MD as Consulting Physician (Nephrology)  Paxton Vasques OD as Consulting Physician (Optometry)  PRANAY Villalobos MD as Obstetrician (Obstetrics)  Parker Mccarthy IV, MD (Urology)  Ike King MD as Consulting Physician (Cardiology)  Jose Roland MD as Consulting Physician (Dermatology)  Prasanth Johnson MD as Consulting Physician (Rheumatology)  Nora Morin LCSW as   Elis Wick MD as Physician (Internal Medicine)  Lexi Bianchi LCSW as  (Hematology and Oncology)  Candace Saleh LMSW as  (Hematology and Oncology)      [facility] Note      Chief Complaint:  Chief Complaint   Patient presents with    Follow-up     Six weeks follow up. No issues or concerns.        History of Present Illness:  HPI    One month follow up. LOV for parotitis, fever. Sx had resolved.   Started Myrebetriq today.   Recent saw Dr King. No changes, plans to f/u in one year.     10/16 nephrology:  1. Creatinine has remained relatively stable ranging between 2.5 and 3.0 for the past year.  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  2. Blood pressure is well controlled on current regimen.  3. Potassium was slightly elevated at 5.3.  She states she is been eating a lot of tomatoes recently.  She is going to cut back.  4. She is on 75 mg twice daily of new oral.  Levels have all been within acceptable range to minimize further progression of calcineurin toxicity.  She is status post heart transplant in 1993.  5. She has slight proteinuria about 500 mg by PC ratio.  Will continue to monitor.   She is not on a RAAS inhibitor secondary to hyperkalemia.      11/2/23 urology:  1. UTI  Urine in clinic indicates the possibility of urinary tract infection, patient is asymptomatic.  Urine will be sent for culture and she will be treated based on final urine culture results secondary asymptomatic.  2. Overactive bladder  Patient was educated on behavior modifications needed to decrease overactive bladder symptoms.  Patient was prescribed Myrbetriq and will return to clinic in 4 weeks for re-evaluation.      Feels really well today.   Plans to travel to [place] this [place].   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   Only taking 1/2 tablet Ambien, tolerating change well, takes longer to fall asleep. Previously worked as night worker.       BP Readings from Last 3 Encounters:   11/02/23 126/64   11/02/23 (!) 156/85   10/31/23 139/82     Wt Readings from Last 3 Encounters:   11/02/23 1354 72.5 kg (159 lb 14.4 oz)   11/02/23 0827 72.4 kg (159 lb 11.6 oz)   10/25/23 0958 71.6 kg (157 lb 13.6 oz)         Review of Systems   Constitutional:  Negative for activity change, appetite change and fatigue.   Respiratory:  Negative for cough and shortness of breath.    Cardiovascular:  Negative for leg swelling.   Gastrointestinal:  Negative for abdominal pain, nausea and vomiting.   Genitourinary:  Negative for dysuria.   Musculoskeletal:  Negative for arthralgias.   Hematological:  Negative for adenopathy.         The following were reviewed: Active problem list, medication list, allergies, family history, social history, and Health Maintenance.       Medications:  Current Outpatient Medications on File Prior to Visit   Medication Sig Dispense Refill    biotin 10,000 mcg Cap Take 1 tablet by mouth once daily.      cycloSPORINE modified, NEORAL, (NEORAL) 25 MG capsule Take 3 capsules (75 mg total) by mouth 2 (two) times daily. 540 capsule 1    ergocalciferol (VITAMIN D2) 50,000 unit Cap Take 50,000 Units by mouth every  7 days.      EVENING PRIMROSE OIL ORAL Take 1,000 mg by mouth once daily.      furosemide (LASIX) 20 MG tablet Take 1 tablet (20 mg total) by mouth once daily. 90 tablet 1    hydrALAZINE (APRESOLINE) 50 MG tablet Take 1 tablet (50 mg total) by mouth every 8 (eight) hours. 270 tablet 3    multivitamin capsule Take 1 capsule by mouth once daily.      NIFEdipine (PROCARDIA-XL) 30 MG (OSM) 24 hr tablet Take 1 tablet (30 mg total) by mouth once daily. 30 tablet 11    pantoprazole (PROTONIX) 20 MG tablet Take 1 tablet (20 mg total) by mouth once daily. 30 tablet 11    polyethylene glycol (GLYCOLAX) 17 gram/dose powder Take 17 g by mouth once daily.      pregabalin (LYRICA) 50 MG capsule Take 1 capsule (50 mg total) by mouth 2 (two) times daily. 180 capsule 1    psyllium husk (METAMUCIL ORAL) Take by mouth.      RETACRIT 20,000 unit/mL injection       temazepam (RESTORIL) 30 mg capsule Take 1 capsule (30 mg total) by mouth nightly as needed for Insomnia. 30 capsule 5    UNABLE TO FIND medication name: Stool softener (Ducolax) Laxative      vitamin E 400 UNIT capsule Take 400 Units by mouth once daily.      zolpidem (AMBIEN) 10 mg Tab Take 1 tablet (10 mg total) by mouth nightly as needed (insomnia). 90 tablet 0    calcitonin, salmon, (FORTICAL) 200 unit/actuation nasal spray 1 spray by Nasal route once daily. 3 each 3    carvediloL (COREG) 6.25 MG tablet Take 1 tablet (6.25 mg total) by mouth 2 (two) times daily with meals. 180 tablet 3    guaiFENesin (MUCINEX) 600 mg 12 hr tablet Take 1,200 mg by mouth as needed.       Current Facility-Administered Medications on File Prior to Visit   Medication Dose Route Frequency Provider Last Rate Last Admin    acetaminophen tablet 1,000 mg  1,000 mg Oral On Call Procedure PRANAY Villalobos MD        famotidine tablet 20 mg  20 mg Oral On Call Procedure PRANAY Villalobos MD           Medications have been reviewed and reconciled with patient at visit today.    Barriers to  medications present (no )    Fall since last office visit (no )      Exam:  Vitals:    11/02/23 1354   BP: 126/64   Pulse: 94   Temp: 98.6 °F (37 °C)     Weight: 72.5 kg (159 lb 14.4 oz)   Body mass index is 29.25 kg/m².      BP Readings from Last 3 Encounters:   11/02/23 126/64   11/02/23 (!) 156/85   10/31/23 139/82     Wt Readings from Last 3 Encounters:   11/02/23 1354 72.5 kg (159 lb 14.4 oz)   11/02/23 0827 72.4 kg (159 lb 11.6 oz)   10/25/23 0958 71.6 kg (157 lb 13.6 oz)            Physical Exam  HENT:      Head: Normocephalic.      Mouth/Throat:      Mouth: Mucous membranes are moist.      Pharynx: No oropharyngeal exudate or posterior oropharyngeal erythema.   Eyes:      General: No scleral icterus.  Cardiovascular:      Rate and Rhythm: Normal rate and regular rhythm.      Heart sounds: Murmur heard.   Pulmonary:      Effort: Pulmonary effort is normal.      Breath sounds: Normal breath sounds.   Abdominal:      General: There is no distension.      Palpations: Abdomen is soft.      Tenderness: There is no abdominal tenderness.   Musculoskeletal:         General: Swelling (mild BLE) present.      Cervical back: Neck supple.   Skin:     General: Skin is warm and dry.   Neurological:      Mental Status: She is alert. Mental status is at baseline.   Psychiatric:         Mood and Affect: Mood normal.         Behavior: Behavior normal.         Laboratory Reviewed: (Yes)  Lab Results   Component Value Date    WBC 5.01 10/16/2023    HGB 9.6 (L) 10/16/2023    HCT 31.2 (L) 10/16/2023     10/16/2023    CHOL 156 09/19/2023    TRIG 176 (H) 09/19/2023    HDL 34 (L) 09/19/2023    ALT 15 09/19/2023    AST 28 09/19/2023     10/11/2023    K 5.3 (H) 10/11/2023     10/11/2023    CREATININE 2.4 (H) 10/11/2023    BUN 39 (H) 10/11/2023    CO2 20 (L) 10/11/2023    TSH 8.358 (H) 06/20/2023    PSA <0.1 05/27/2008    INR 1.0 11/22/2021    HGBA1C 5.1 03/08/2023           Health Maintenance  Health Maintenance  Topics with due status: Not Due       Topic Last Completion Date    TETANUS VACCINE 09/09/2020    Colorectal Cancer Screening 02/25/2021    DEXA Scan 01/25/2023    Hemoglobin A1c (Diabetic Prevention Screening) 03/08/2023    Mammogram 04/10/2023    Lipid Panel 09/19/2023     Health Maintenance Due   Topic Date Due    RSV Vaccine (Age 60+) (1 - 1-dose 60+ series) Never done    Influenza Vaccine (1) 09/01/2023    COVID-19 Vaccine (4 - 2023-24 season) 09/01/2023    Pneumococcal Vaccines (Age 65+) (3 - PPSV23 or PCV20) 10/22/2023         Assessment:  Problem List Items Addressed This Visit          Cardiac/Vascular    Chronic diastolic (congestive) heart failure     No acute sx. Continues carvedilol, furosemide.   Cannot tolerate ARB.             Oncology    Anemia associated with stage 4 chronic renal failure (Chronic)     Lab Results   Component Value Date    HGB 9.6 (L) 10/16/2023   Followed by hematology. Receiving epo as indicated.              Endocrine    Secondary hyperparathyroidism, renal (Chronic)     Lab Results   Component Value Date    .9 (H) 10/11/2023    CALCIUM 8.8 10/11/2023    CAION 1.13 09/05/2018    PHOS 3.7 10/11/2023   Followed by nephrology. Continues calcitonin.              Acquired hypothyroidism     Lab Results   Component Value Date    TSH 8.358 (H) 06/20/2023   Repeat with next lab. No sx.         Relevant Orders    TSH         Plan:  Anemia associated with stage 4 chronic renal failure    Secondary hyperparathyroidism, renal    Acquired hypothyroidism  -     TSH; Future; Expected date: 11/02/2023    Chronic diastolic (congestive) heart failure      -Patient's lab results were reviewed and discussed with patient  -Treatment options and alternatives were discussed with the patient. Patient expressed understanding. Patient was given the opportunity to ask questions and be an active participant in their medical care. Patient had no further questions or concerns at this time.    -Documentation of patient's health and condition was obtained from family member who was present during visit.  -Patient is an overall moderate risk for health complications from their medical conditions.       Follow up: Follow up in about 2 months (around 1/2/2024).      After visit summary printed and given to patient upon discharge.  Patient goals and care plan are included in After visit summary.    Total medical decision making time was 24 min.  The following issues were discussed: Diagnoses of Anemia associated with stage 4 chronic renal failure, Secondary hyperparathyroidism, renal, Acquired hypothyroidism, and Chronic diastolic (congestive) heart failure were pertinent to this visit.    Health maintenance needs, recent test results and goals of care discussed with pt and questions answered.

## 2023-11-02 NOTE — ASSESSMENT & PLAN NOTE
Lab Results   Component Value Date    .9 (H) 10/11/2023    CALCIUM 8.8 10/11/2023    CAION 1.13 09/05/2018    PHOS 3.7 10/11/2023   Followed by nephrology. Continues calcitonin.

## 2023-11-02 NOTE — PATIENT INSTRUCTIONS
If you are feeling unwell, we'd like to be the first ones to know here at [facility]! Please give us a call. Same day appointments are our top priority to keep you well and out of the emergency rooms and hospitals. Call [phone] for our direct line. After hours advice is always available. Please call [phone] after hours to speak to the on-call team.

## 2023-11-05 LAB — BACTERIA UR CULT: ABNORMAL

## 2023-11-06 ENCOUNTER — TELEPHONE (OUTPATIENT)
Dept: UROLOGY | Facility: CLINIC | Age: 68
End: 2023-11-06
Payer: MEDICARE

## 2023-11-06 RX ORDER — SULFAMETHOXAZOLE AND TRIMETHOPRIM 800; 160 MG/1; MG/1
1 TABLET ORAL 2 TIMES DAILY
Qty: 14 TABLET | Refills: 0 | Status: SHIPPED | OUTPATIENT
Start: 2023-11-06 | End: 2023-11-13

## 2023-11-06 NOTE — TELEPHONE ENCOUNTER
----- Message from Sri Gallagher NP sent at 11/6/2023  9:29 AM CST -----  Please call patient inform her that final urine culture is positive for infection, Bactrim was sent to her local pharmacy.

## 2023-11-06 NOTE — TELEPHONE ENCOUNTER
Informed patient of cx results and that medication had been sent to pharmacy. Patient verbalized understanding and stated that she would start antibiotics today.

## 2023-11-14 ENCOUNTER — INFUSION (OUTPATIENT)
Dept: INFUSION THERAPY | Facility: HOSPITAL | Age: 68
End: 2023-11-14
Attending: INTERNAL MEDICINE
Payer: MEDICARE

## 2023-11-14 ENCOUNTER — LAB VISIT (OUTPATIENT)
Dept: LAB | Facility: HOSPITAL | Age: 68
End: 2023-11-14
Payer: MEDICARE

## 2023-11-14 VITALS
SYSTOLIC BLOOD PRESSURE: 151 MMHG | TEMPERATURE: 98 F | OXYGEN SATURATION: 99 % | RESPIRATION RATE: 16 BRPM | DIASTOLIC BLOOD PRESSURE: 80 MMHG | HEART RATE: 84 BPM

## 2023-11-14 DIAGNOSIS — D69.6 THROMBOCYTOPENIA, UNSPECIFIED: ICD-10-CM

## 2023-11-14 DIAGNOSIS — E03.9 ACQUIRED HYPOTHYROIDISM: ICD-10-CM

## 2023-11-14 DIAGNOSIS — N18.4 ANEMIA ASSOCIATED WITH STAGE 4 CHRONIC RENAL FAILURE: Chronic | ICD-10-CM

## 2023-11-14 DIAGNOSIS — D50.9 IRON DEFICIENCY ANEMIA, UNSPECIFIED IRON DEFICIENCY ANEMIA TYPE: ICD-10-CM

## 2023-11-14 DIAGNOSIS — D63.1 ANEMIA ASSOCIATED WITH STAGE 4 CHRONIC RENAL FAILURE: Chronic | ICD-10-CM

## 2023-11-14 DIAGNOSIS — N18.4 ANEMIA ASSOCIATED WITH STAGE 4 CHRONIC RENAL FAILURE: ICD-10-CM

## 2023-11-14 DIAGNOSIS — M81.8 OTHER OSTEOPOROSIS WITHOUT CURRENT PATHOLOGICAL FRACTURE: Primary | ICD-10-CM

## 2023-11-14 DIAGNOSIS — D63.1 ANEMIA ASSOCIATED WITH STAGE 4 CHRONIC RENAL FAILURE: ICD-10-CM

## 2023-11-14 LAB
ALBUMIN SERPL BCP-MCNC: 3.8 G/DL (ref 3.5–5.2)
ALP SERPL-CCNC: 102 U/L (ref 55–135)
ALT SERPL W/O P-5'-P-CCNC: 19 U/L (ref 10–44)
ANION GAP SERPL CALC-SCNC: 10 MMOL/L (ref 8–16)
AST SERPL-CCNC: 33 U/L (ref 10–40)
BASOPHILS # BLD AUTO: 0.02 K/UL (ref 0–0.2)
BASOPHILS NFR BLD: 0.8 % (ref 0–1.9)
BILIRUB SERPL-MCNC: 0.4 MG/DL (ref 0.1–1)
BUN SERPL-MCNC: 46 MG/DL (ref 8–23)
CALCIUM SERPL-MCNC: 8.8 MG/DL (ref 8.7–10.5)
CHLORIDE SERPL-SCNC: 111 MMOL/L (ref 95–110)
CO2 SERPL-SCNC: 20 MMOL/L (ref 23–29)
CREAT SERPL-MCNC: 3.7 MG/DL (ref 0.5–1.4)
DIFFERENTIAL METHOD: ABNORMAL
EOSINOPHIL # BLD AUTO: 0.1 K/UL (ref 0–0.5)
EOSINOPHIL NFR BLD: 3.7 % (ref 0–8)
ERYTHROCYTE [DISTWIDTH] IN BLOOD BY AUTOMATED COUNT: 16.5 % (ref 11.5–14.5)
EST. GFR  (NO RACE VARIABLE): 13 ML/MIN/1.73 M^2
FERRITIN SERPL-MCNC: 97 NG/ML (ref 20–300)
GLUCOSE SERPL-MCNC: 94 MG/DL (ref 70–110)
HCT VFR BLD AUTO: 30.4 % (ref 37–48.5)
HGB BLD-MCNC: 9.4 G/DL (ref 12–16)
IMM GRANULOCYTES # BLD AUTO: 0.01 K/UL (ref 0–0.04)
IMM GRANULOCYTES NFR BLD AUTO: 0.4 % (ref 0–0.5)
IRON SERPL-MCNC: 135 UG/DL (ref 30–160)
LYMPHOCYTES # BLD AUTO: 0.9 K/UL (ref 1–4.8)
LYMPHOCYTES NFR BLD: 36.6 % (ref 18–48)
MCH RBC QN AUTO: 27.8 PG (ref 27–31)
MCHC RBC AUTO-ENTMCNC: 30.9 G/DL (ref 32–36)
MCV RBC AUTO: 90 FL (ref 82–98)
MONOCYTES # BLD AUTO: 0.3 K/UL (ref 0.3–1)
MONOCYTES NFR BLD: 13.4 % (ref 4–15)
NEUTROPHILS # BLD AUTO: 1.1 K/UL (ref 1.8–7.7)
NEUTROPHILS NFR BLD: 45.1 % (ref 38–73)
NRBC BLD-RTO: 0 /100 WBC
PLATELET # BLD AUTO: 205 K/UL (ref 150–450)
PMV BLD AUTO: 9.5 FL (ref 9.2–12.9)
POTASSIUM SERPL-SCNC: 5.8 MMOL/L (ref 3.5–5.1)
PROT SERPL-MCNC: 8.3 G/DL (ref 6–8.4)
RBC # BLD AUTO: 3.38 M/UL (ref 4–5.4)
SATURATED IRON: 41 % (ref 20–50)
SODIUM SERPL-SCNC: 141 MMOL/L (ref 136–145)
T4 FREE SERPL-MCNC: 0.83 NG/DL (ref 0.71–1.51)
TOTAL IRON BINDING CAPACITY: 327 UG/DL (ref 250–450)
TRANSFERRIN SERPL-MCNC: 221 MG/DL (ref 200–375)
TSH SERPL DL<=0.005 MIU/L-ACNC: 7.54 UIU/ML (ref 0.4–4)
WBC # BLD AUTO: 2.46 K/UL (ref 3.9–12.7)

## 2023-11-14 PROCEDURE — 82728 ASSAY OF FERRITIN: CPT | Mod: HCNC

## 2023-11-14 PROCEDURE — 83540 ASSAY OF IRON: CPT | Mod: HCNC

## 2023-11-14 PROCEDURE — 36415 COLL VENOUS BLD VENIPUNCTURE: CPT | Mod: HCNC

## 2023-11-14 PROCEDURE — 85025 COMPLETE CBC W/AUTO DIFF WBC: CPT | Mod: HCNC

## 2023-11-14 PROCEDURE — 96372 THER/PROPH/DIAG INJ SC/IM: CPT | Mod: HCNC

## 2023-11-14 PROCEDURE — 84466 ASSAY OF TRANSFERRIN: CPT | Mod: HCNC

## 2023-11-14 PROCEDURE — 63600175 PHARM REV CODE 636 W HCPCS: Mod: JZ,EC,JG,HCNC

## 2023-11-14 PROCEDURE — 84443 ASSAY THYROID STIM HORMONE: CPT | Mod: HCNC | Performed by: NURSE PRACTITIONER

## 2023-11-14 PROCEDURE — 84439 ASSAY OF FREE THYROXINE: CPT | Mod: HCNC

## 2023-11-14 PROCEDURE — 80053 COMPREHEN METABOLIC PANEL: CPT | Mod: HCNC

## 2023-11-14 RX ADMIN — EPOETIN ALFA-EPBX 20000 UNITS: 20000 INJECTION, SOLUTION INTRAVENOUS; SUBCUTANEOUS at 01:11

## 2023-11-14 NOTE — DISCHARGE INSTRUCTIONS
[facility] [place]  [address] [facility]  [address]  [phone] phone     [phone] fax  Hours of Operation: Monday- Friday 8:00am- 5:00pm  After hours phone  [phone]  Hematology / Oncology Physicians on call      SALENA Arevalo Dr., NP Sydney Prescott, ONELIA Macias FNP    Please call with any concerns regarding your appointment today.

## 2023-11-14 NOTE — NURSING
Patient arrived for retacrit injection. Patient voices no concerns at this time. Labs reviewed. Vital signs and assessment documented. Retacrit 20k units administered SQ as documented on MAR using aseptic technique. Patient tolerated well. Band aid applied to site. Patient stated she was ready to leave prior to 15 min observation complete. Patient denies any adverse reaction with previous injections.  Patient received avs and ambulates out of treatment room.

## 2023-11-15 ENCOUNTER — HOSPITAL ENCOUNTER (EMERGENCY)
Facility: HOSPITAL | Age: 68
Discharge: HOME | End: 2023-11-15
Attending: EMERGENCY MEDICINE
Payer: MEDICARE

## 2023-11-15 ENCOUNTER — TELEPHONE (OUTPATIENT)
Dept: PRIMARY CARE CLINIC | Facility: CLINIC | Age: 68
End: 2023-11-15
Payer: MEDICARE

## 2023-11-15 ENCOUNTER — PATIENT MESSAGE (OUTPATIENT)
Dept: PRIMARY CARE CLINIC | Facility: CLINIC | Age: 68
End: 2023-11-15
Payer: MEDICARE

## 2023-11-15 ENCOUNTER — TELEPHONE (OUTPATIENT)
Dept: NEPHROLOGY | Facility: CLINIC | Age: 68
End: 2023-11-15
Payer: MEDICARE

## 2023-11-15 VITALS
HEART RATE: 86 BPM | TEMPERATURE: 97 F | OXYGEN SATURATION: 99 % | RESPIRATION RATE: 20 BRPM | DIASTOLIC BLOOD PRESSURE: 66 MMHG | BODY MASS INDEX: 29.13 KG/M2 | SYSTOLIC BLOOD PRESSURE: 124 MMHG | WEIGHT: 159.31 LBS

## 2023-11-15 DIAGNOSIS — E87.5 HYPERKALEMIA: ICD-10-CM

## 2023-11-15 DIAGNOSIS — N17.9 AKI (ACUTE KIDNEY INJURY): Primary | ICD-10-CM

## 2023-11-15 DIAGNOSIS — Z94.1 HEART TRANSPLANTED: Primary | Chronic | ICD-10-CM

## 2023-11-15 DIAGNOSIS — E87.5 HYPERKALEMIA: Primary | ICD-10-CM

## 2023-11-15 DIAGNOSIS — M25.50 ARTHRALGIA, UNSPECIFIED JOINT: Primary | ICD-10-CM

## 2023-11-15 LAB
ALBUMIN SERPL BCP-MCNC: 3.6 G/DL (ref 3.5–5.2)
ALP SERPL-CCNC: 103 U/L (ref 55–135)
ALT SERPL W/O P-5'-P-CCNC: 15 U/L (ref 10–44)
ANION GAP SERPL CALC-SCNC: 12 MMOL/L (ref 8–16)
AST SERPL-CCNC: 29 U/L (ref 10–40)
BASOPHILS # BLD AUTO: 0.01 K/UL (ref 0–0.2)
BASOPHILS NFR BLD: 0.4 % (ref 0–1.9)
BILIRUB SERPL-MCNC: 0.4 MG/DL (ref 0.1–1)
BILIRUB UR QL STRIP: NEGATIVE
BUN SERPL-MCNC: 43 MG/DL (ref 8–23)
CALCIUM SERPL-MCNC: 8.8 MG/DL (ref 8.7–10.5)
CHLORIDE SERPL-SCNC: 111 MMOL/L (ref 95–110)
CLARITY UR: CLEAR
CO2 SERPL-SCNC: 16 MMOL/L (ref 23–29)
COLOR UR: YELLOW
CREAT SERPL-MCNC: 3.3 MG/DL (ref 0.5–1.4)
DIFFERENTIAL METHOD: ABNORMAL
EOSINOPHIL # BLD AUTO: 0.1 K/UL (ref 0–0.5)
EOSINOPHIL NFR BLD: 2.4 % (ref 0–8)
ERYTHROCYTE [DISTWIDTH] IN BLOOD BY AUTOMATED COUNT: 16.3 % (ref 11.5–14.5)
EST. GFR  (NO RACE VARIABLE): 15 ML/MIN/1.73 M^2
GLUCOSE SERPL-MCNC: 87 MG/DL (ref 70–110)
GLUCOSE UR QL STRIP: NEGATIVE
HCT VFR BLD AUTO: 30 % (ref 37–48.5)
HGB BLD-MCNC: 9.5 G/DL (ref 12–16)
HGB UR QL STRIP: NEGATIVE
IMM GRANULOCYTES # BLD AUTO: 0.01 K/UL (ref 0–0.04)
IMM GRANULOCYTES NFR BLD AUTO: 0.4 % (ref 0–0.5)
KETONES UR QL STRIP: NEGATIVE
LEUKOCYTE ESTERASE UR QL STRIP: NEGATIVE
LYMPHOCYTES # BLD AUTO: 0.8 K/UL (ref 1–4.8)
LYMPHOCYTES NFR BLD: 32.4 % (ref 18–48)
MCH RBC QN AUTO: 28.2 PG (ref 27–31)
MCHC RBC AUTO-ENTMCNC: 31.7 G/DL (ref 32–36)
MCV RBC AUTO: 89 FL (ref 82–98)
MONOCYTES # BLD AUTO: 0.4 K/UL (ref 0.3–1)
MONOCYTES NFR BLD: 16.4 % (ref 4–15)
NEUTROPHILS # BLD AUTO: 1.2 K/UL (ref 1.8–7.7)
NEUTROPHILS NFR BLD: 48 % (ref 38–73)
NITRITE UR QL STRIP: NEGATIVE
NRBC BLD-RTO: 0 /100 WBC
PH UR STRIP: 7 [PH] (ref 5–8)
PLATELET # BLD AUTO: 172 K/UL (ref 150–450)
PMV BLD AUTO: 9.7 FL (ref 9.2–12.9)
POCT GLUCOSE: 102 MG/DL (ref 70–110)
POCT GLUCOSE: <20 MG/DL (ref 70–110)
POTASSIUM SERPL-SCNC: 5.2 MMOL/L (ref 3.5–5.1)
PROT SERPL-MCNC: 7.9 G/DL (ref 6–8.4)
PROT UR QL STRIP: NEGATIVE
RBC # BLD AUTO: 3.37 M/UL (ref 4–5.4)
SODIUM SERPL-SCNC: 139 MMOL/L (ref 136–145)
SP GR UR STRIP: 1.01 (ref 1–1.03)
URN SPEC COLLECT METH UR: NORMAL
UROBILINOGEN UR STRIP-ACNC: NEGATIVE EU/DL
WBC # BLD AUTO: 2.5 K/UL (ref 3.9–12.7)

## 2023-11-15 PROCEDURE — 93010 ELECTROCARDIOGRAM REPORT: CPT | Mod: HCNC,,, | Performed by: INTERNAL MEDICINE

## 2023-11-15 PROCEDURE — 25000003 PHARM REV CODE 250: Mod: HCNC | Performed by: EMERGENCY MEDICINE

## 2023-11-15 PROCEDURE — 99215 PR OFFICE/OUTPT VISIT, EST, LEVL V, 40-54 MIN: ICD-10-PCS | Mod: HCNC,,, | Performed by: INTERNAL MEDICINE

## 2023-11-15 PROCEDURE — 99215 OFFICE O/P EST HI 40 MIN: CPT | Mod: HCNC,,, | Performed by: INTERNAL MEDICINE

## 2023-11-15 PROCEDURE — 93005 ELECTROCARDIOGRAM TRACING: CPT | Mod: HCNC

## 2023-11-15 PROCEDURE — 82962 GLUCOSE BLOOD TEST: CPT | Mod: HCNC

## 2023-11-15 PROCEDURE — 81003 URINALYSIS AUTO W/O SCOPE: CPT | Mod: 91,HCNC | Performed by: EMERGENCY MEDICINE

## 2023-11-15 PROCEDURE — 96365 THER/PROPH/DIAG IV INF INIT: CPT | Mod: HCNC

## 2023-11-15 PROCEDURE — 85025 COMPLETE CBC W/AUTO DIFF WBC: CPT | Mod: HCNC | Performed by: EMERGENCY MEDICINE

## 2023-11-15 PROCEDURE — 99284 EMERGENCY DEPT VISIT MOD MDM: CPT | Mod: 25,HCNC

## 2023-11-15 PROCEDURE — 93010 EKG 12-LEAD: ICD-10-PCS | Mod: HCNC,,, | Performed by: INTERNAL MEDICINE

## 2023-11-15 PROCEDURE — 63600175 PHARM REV CODE 636 W HCPCS: Mod: HCNC | Performed by: EMERGENCY MEDICINE

## 2023-11-15 PROCEDURE — 96366 THER/PROPH/DIAG IV INF ADDON: CPT | Mod: HCNC

## 2023-11-15 PROCEDURE — 80053 COMPREHEN METABOLIC PANEL: CPT | Mod: HCNC | Performed by: EMERGENCY MEDICINE

## 2023-11-15 RX ORDER — CALCIUM GLUCONATE 20 MG/ML
1 INJECTION, SOLUTION INTRAVENOUS
Status: COMPLETED | OUTPATIENT
Start: 2023-11-15 | End: 2023-11-15

## 2023-11-15 RX ORDER — DEXTROSE MONOHYDRATE 100 MG/ML
25 INJECTION, SOLUTION INTRAVENOUS
Status: COMPLETED | OUTPATIENT
Start: 2023-11-15 | End: 2023-11-15

## 2023-11-15 RX ORDER — FUROSEMIDE 10 MG/ML
60 INJECTION INTRAMUSCULAR; INTRAVENOUS
Status: COMPLETED | OUTPATIENT
Start: 2023-11-15 | End: 2023-11-15

## 2023-11-15 RX ADMIN — INSULIN HUMAN 10 UNITS: 100 INJECTION, SOLUTION PARENTERAL at 03:11

## 2023-11-15 RX ADMIN — DEXTROSE MONOHYDRATE 25 G: 100 INJECTION, SOLUTION INTRAVENOUS at 03:11

## 2023-11-15 RX ADMIN — CALCIUM GLUCONATE 1 G: 20 INJECTION, SOLUTION INTRAVENOUS at 03:11

## 2023-11-15 RX ADMIN — FUROSEMIDE 60 MG: 10 INJECTION, SOLUTION INTRAMUSCULAR; INTRAVENOUS at 03:11

## 2023-11-15 NOTE — ED PROVIDER NOTES
SCRIBE #1 NOTE: I, Alina Glasgow, am scribing for, and in the presence of, Gulshan Brush Jr., MD. I have scribed the entire note.       History     Chief Complaint   Patient presents with    Abnormal Lab     Pt has blood work done this morning and was sent to ER for elevated potassium. Pt states she is a heart transplant pt 1993     Review of patient's allergies indicates:   Allergen Reactions    Lisinopril Swelling and Rash    Augmentin [amoxicillin-pot clavulanate] Diarrhea    Zyvox [linezolid] Nausea And Vomiting         History of Present Illness     HPI    11/15/2023, 2:07 PM  History obtained from the patient      History of Present Illness: Nadia Damon is a 68 y.o. female patient with a PMHx of HTN, CHF, CKD, and CAD who presents to the Emergency Department for evaluation of hyperkalemia which onset today. Pt's blood work from this morning showed high potassium, and pt was advised to come to the ED. Pt notes that she had a heart transplant in 1993. Symptoms are constant and moderate in severity. No mitigating or exacerbating factors reported. No associated sxs reported. Patient denies any fever, chills, nausea, vomiting, and all other sxs at this time. No Prior Tx reported. No further complaints or concerns at this time.       Arrival mode: Personal vehicle  PCP: Elis Wick MD        Past Medical History:  Past Medical History:   Diagnosis Date    Abdominal wall hernia     CT Renal 6/11/2018---Small fat containing superior ventral abdominal wall hernia at the epicardial pacing lead site.    Abnormal mammogram 10/12/2021    GRACE (acute kidney injury) 11/22/2021    Anxiety     Arthritis     ZEN HIPS    Breast cancer in female 08/2021    LEFT BREAST    C. difficile colitis 11/29/2021    Cellulitis of axilla, left 12/23/2021    Chronic diastolic heart failure 12/16/2021    Chronic kidney disease     stage 4, GFR 15-29 ml/min    Chronic midline low back pain without sciatica 06/18/2018    Closed  nondisplaced fracture of distal phalanx of left great toe with routine healing 10/22/2018    Coronary artery disease 1993    heart transplant    Cystitis 05/10/2022    Depression     Encounter for blood transfusion     Fibromyalgia     on Lyrica    Heart failure     native heart cardiomyopathy    Heart transplanted 1993    due to cardiomyopathy    History of hyperparathyroidism; Hyperparathyroidism, secondary renal     PT DENIES    Hypertension     Immune disorder     anti rejection meds    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    Impaired mobility 07/28/2022    Iron deficiency anemia 08/15/2017    Kidney stones     passed per pt    Obesity     Other osteoporosis without current pathological fracture 08/30/2019    Parotitis, acute 9/19/2023    Pharyngitis 1/9/2019    Severe sepsis 11/22/2021    Shingles 2003 approx    left leg    Subclinical hypothyroidism 06/16/2023    Thrombocytopenia, unspecified 11/29/2021    Trouble in sleeping     Urinary incontinence        Past Surgical History:  Past Surgical History:   Procedure Laterality Date    BLADDER SURGERY  2015 approx    mesh - Dr Everett then 2nd reconstructive sx Dr Onofre    BREAST BIOPSY Bilateral     NEGATIVE    BREAST BIOPSY Right 10/31/2022    benign    BREAST LUMPECTOMY Left 2021    BREAST SURGERY Left 09/28/2015    Bx - benign    BREAST SURGERY Right 12/2015    Bx benign    CARDIAC PACEMAKER REMOVAL Left 06/26/2014    Pacer defirillator removed. Put in 1993 aat time of heart transplant    CARPAL TUNNEL RELEASE Left 03/03/2015    Dr. Hall    COLONOSCOPY N/A 02/25/2021    Procedure: COLONOSCOPY;  Surgeon: Freida Ramirez MD;  Location: [facility] ENDO;  Service: Endoscopy;  Laterality: N/A;    CYSTOCELE REPAIR      Twice with mesh removal    EPIDURAL STEROID INJECTION INTO CERVICAL SPINE N/A 02/02/2023    Procedure: T11/T12 IL HELLEN;  Surgeon: Jassi Pierre MD;  Location: [facility] PAIN MGT;  Service: Pain Management;  Laterality: N/A;     HEART TRANSPLANT  1993    HERNIA REPAIR Right 1971 approx    Inguinal    HYSTERECTOMY  1983    vag hyst /LSO     INCISION AND DRAINAGE OF ABSCESS Left 12/24/2021    Procedure: INCISION AND DRAINAGE, ABSCESS;  Surgeon: Joseph Longo MD;  Location: [facility] OR;  Service: General;  Laterality: Left;    INJECTION OF ANESTHETIC AGENT AROUND MEDIAL BRANCH NERVES INNERVATING LUMBAR FACET JOINT Right 10/19/2022    Procedure: Right L4/L5 and L5/S1 MBB;  Surgeon: Jassi Pierre MD;  Location: [facility] PAIN MGT;  Service: Pain Management;  Laterality: Right;    INJECTION OF ANESTHETIC AGENT AROUND MEDIAL BRANCH NERVES INNERVATING LUMBAR FACET JOINT Right 11/09/2022    Procedure: Right L4/L5 and L5/S1 MBB;  Surgeon: Jassi Pierre MD;  Location: [facility] PAIN MGT;  Service: Pain Management;  Laterality: Right;    INJECTION OF ANESTHETIC AGENT INTO SACROILIAC JOINT Right 08/22/2022    Procedure: Right SIJ Injection Right L5/S1 Facte Injection;  Surgeon: Jassi Pierre MD;  Location: [facility] PAIN MGT;  Service: Pain Management;  Laterality: Right;    INSERTION OF TUNNELED CENTRAL VENOUS CATHETER (CVC) WITH SUBCUTANEOUS PORT N/A 11/09/2021    Procedure: [id number];  Surgeon: Christoph Douglas MD;  Location: [facility] OR;  Service: General;  Laterality: N/A;    RADIOFREQUENCY THERMOCOAGULATION Right 12/07/2022    Procedure: Right L4/L5 and L5/S1 Lumbar RFA;  Surgeon: Jassi Pierre MD;  Location: [facility] PAIN MGT;  Service: Pain Management;  Laterality: Right;    REMOVAL OF VASCULAR ACCESS PORT      ROBOT-ASSISTED LAPAROSCOPIC ABDOMINAL SACROCOLPOPEXY N/A 8/10/2023    Procedure: ROBOTIC SACROCOLPOPEXY, ABDOMEN;  Surgeon: PRANAY Villalobos MD;  Location: [facility] OR;  Service: OB/GYN;  Laterality: N/A;    ROBOT-ASSISTED LAPAROSCOPIC OOPHORECTOMY Right 8/10/2023    Procedure: ROBOTIC OOPHORECTOMY;  Surgeon: PRANAY Villalobos MD;  Location: [facility] OR;  Service: OB/GYN;  Laterality: Right;    SENTINEL LYMPH NODE BIOPSY Left 10/12/2021     Procedure: BIOPSY, LYMPH NODE, SENTINEL;  Surgeon: Christoph Douglas MD;  Location: [facility] OR;  Service: General;  Laterality: Left;    TOE SURGERY      XI ROBOTIC URETHROPEXY N/A 8/10/2023    Procedure: XI ROBOTIC URETHROPEXY;  Surgeon: PRANAY Villalobos MD;  Location: [facility] OR;  Service: OB/GYN;  Laterality: N/A;         Family History:  Family History   Problem Relation Age of Onset    Cancer Mother 38        breast    Breast cancer Mother     Breast cancer Maternal Grandmother     Heart disease Maternal Grandmother     Hypertension Son     Cataracts Cousin     Diabetes Neg Hx     Stroke Neg Hx     Kidney disease Neg Hx     Asthma Neg Hx     COPD Neg Hx     Melanoma Neg Hx     Hyperlipidemia Neg Hx        Social History:  Social History     Tobacco Use    Smoking status: Never    Smokeless tobacco: Never   Substance and Sexual Activity    Alcohol use: Never     Alcohol/week: 0.0 standard drinks of alcohol    Drug use: No    Sexual activity: Not Currently     Partners: Male     Birth control/protection: See Surgical Hx        Review of Systems     Review of Systems   Constitutional:  Negative for chills and fever.   HENT:  Negative for sore throat.    Respiratory:  Negative for shortness of breath.    Cardiovascular:  Negative for chest pain.   Gastrointestinal:  Negative for nausea and vomiting.   Genitourinary:  Negative for dysuria.   Musculoskeletal:  Negative for back pain.   Skin:  Negative for rash.   Neurological:  Negative for weakness.   Hematological:  Does not bruise/bleed easily.   All other systems reviewed and are negative.     Physical Exam     Initial Vitals [11/15/23 1311]   BP Pulse Resp Temp SpO2   111/67 91 20 98.6 °F (37 °C) 100 %      MAP       --          Physical Exam  Nursing Notes and Vital Signs Reviewed.  Constitutional: Patient is in no acute distress. Well-developed and well-nourished.  Head: Atraumatic. Normocephalic.  Eyes:  EOM intact.  No scleral icterus.  ENT: Mucous membranes  are moist.  Nares clear   Neck:  Full ROM. No JVD.  Cardiovascular: Regular rate. Regular rhythm No murmurs, rubs, or gallops. Distal pulses are 2+ and symmetric  Pulmonary/Chest: No respiratory distress. Clear to auscultation bilaterally. No wheezing or rales.  Equal chest wall rise bilaterally  Abdominal: Soft and non-distended.  There is no tenderness.  No rebound, guarding, or rigidity. Good bowel sounds.  Genitourinary: No CVA tenderness.  No suprapubic tenderness  Musculoskeletal: Moves all extremities. No obvious deformities.  5 x 5 strength in all extremities   Skin: Warm and dry.  Neurological:  Alert, awake, and appropriate.  Normal speech.  No acute focal neurological deficits are appreciated.  Two through 12 intact bilaterally.  Psychiatric: Normal affect. Good eye contact. Appropriate in content.     ED Course   Critical Care    Date/Time: 11/15/2023 2:17 PM    Performed by: Gulshan Brush Jr., MD  Authorized by: Gulshan Brush Jr., MD  Direct patient critical care time: 15 minutes  Additional history critical care time: 5 minutes  Ordering / reviewing critical care time: 5 minutes  Documentation critical care time: 5 minutes  Consulting other physicians critical care time: 5 minutes  Total critical care time (exclusive of procedural time) : 35 minutes  Critical care time was exclusive of separately billable procedures and treating other patients and teaching time.  Critical care was necessary to treat or prevent imminent or life-threatening deterioration of the following conditions: Hyperkalemia.  Critical care was time spent personally by me on the following activities: blood draw for specimens, development of treatment plan with patient or surrogate, discussions with consultants, interpretation of cardiac output measurements, examination of patient, evaluation of patient's response to treatment, obtaining history from patient or surrogate, ordering and performing treatments and interventions,  ordering and review of laboratory studies, ordering and review of radiographic studies, pulse oximetry, re-evaluation of patient's condition and review of old charts.        ED Vital Signs:  Vitals:    11/15/23 1311 11/15/23 1459 11/15/23 1530 11/15/23 1600   BP: 111/67  (!) 170/91 (!) 148/84   Pulse: 91 87 97 94   Resp: 20  18 20   Temp: 98.6 °F (37 °C)      TempSrc: Oral      SpO2: 100%  100% 100%   Weight: 72.3 kg (159 lb 4.5 oz)       11/15/23 1724   BP: 124/66   Pulse: 86   Resp: 20   Temp: 97.4 °F (36.3 °C)   TempSrc: Oral   SpO2: 99%   Weight:        Abnormal Lab Results:  Labs Reviewed   COMPREHENSIVE METABOLIC PANEL - Abnormal; Notable for the following components:       Result Value    Potassium 5.2 (*)     Chloride 111 (*)     CO2 16 (*)     BUN 43 (*)     Creatinine 3.3 (*)     eGFR 15 (*)     All other components within normal limits   CBC W/ AUTO DIFFERENTIAL - Abnormal; Notable for the following components:    WBC 2.50 (*)     RBC 3.37 (*)     Hemoglobin 9.5 (*)     Hematocrit 30.0 (*)     MCHC 31.7 (*)     RDW 16.3 (*)     Gran # (ANC) 1.2 (*)     Lymph # 0.8 (*)     Mono % 16.4 (*)     All other components within normal limits   POCT GLUCOSE - Abnormal; Notable for the following components:    POCT Glucose <20 (*)     All other components within normal limits   URINALYSIS, REFLEX TO URINE CULTURE    Narrative:     Specimen Source->Urine   POCT GLUCOSE        All Lab Results:  Results for orders placed or performed during the hospital encounter of 11/15/23   Comprehensive metabolic panel   Result Value Ref Range    Sodium 139 136 - 145 mmol/L    Potassium 5.2 (H) 3.5 - 5.1 mmol/L    Chloride 111 (H) 95 - 110 mmol/L    CO2 16 (L) 23 - 29 mmol/L    Glucose 87 70 - 110 mg/dL    BUN 43 (H) 8 - 23 mg/dL    Creatinine 3.3 (H) 0.5 - 1.4 mg/dL    Calcium 8.8 8.7 - 10.5 mg/dL    Total Protein 7.9 6.0 - 8.4 g/dL    Albumin 3.6 3.5 - 5.2 g/dL    Total Bilirubin 0.4 0.1 - 1.0 mg/dL    Alkaline Phosphatase 103 55  - 135 U/L    AST 29 10 - 40 U/L    ALT 15 10 - 44 U/L    eGFR 15 (A) >60 mL/min/1.73 m^2    Anion Gap 12 8 - 16 mmol/L   CBC auto differential   Result Value Ref Range    WBC 2.50 (L) 3.90 - 12.70 K/uL    RBC 3.37 (L) 4.00 - 5.40 M/uL    Hemoglobin 9.5 (L) 12.0 - 16.0 g/dL    Hematocrit 30.0 (L) 37.0 - 48.5 %    MCV 89 82 - 98 fL    MCH 28.2 27.0 - 31.0 pg    MCHC 31.7 (L) 32.0 - 36.0 g/dL    RDW 16.3 (H) 11.5 - 14.5 %    Platelets 172 150 - 450 K/uL    MPV 9.7 9.2 - 12.9 fL    Immature Granulocytes 0.4 0.0 - 0.5 %    Gran # (ANC) 1.2 (L) 1.8 - 7.7 K/uL    Immature Grans (Abs) 0.01 0.00 - 0.04 K/uL    Lymph # 0.8 (L) 1.0 - 4.8 K/uL    Mono # 0.4 0.3 - 1.0 K/uL    Eos # 0.1 0.0 - 0.5 K/uL    Baso # 0.01 0.00 - 0.20 K/uL    nRBC 0 0 /100 WBC    Gran % 48.0 38.0 - 73.0 %    Lymph % 32.4 18.0 - 48.0 %    Mono % 16.4 (H) 4.0 - 15.0 %    Eosinophil % 2.4 0.0 - 8.0 %    Basophil % 0.4 0.0 - 1.9 %    Differential Method Automated    Urinalysis, Reflex to Urine Culture Urine, Clean Catch    Specimen: Urine   Result Value Ref Range    Specimen UA Urine, Clean Catch     Color, UA Yellow Yellow, Straw, [place]    Appearance, UA Clear Clear    pH, UA 7.0 5.0 - 8.0    Specific Gravity, UA 1.010 1.005 - 1.030    Protein, UA Negative Negative    Glucose, UA Negative Negative    Ketones, UA Negative Negative    Bilirubin (UA) Negative Negative    Occult Blood UA Negative Negative    Nitrite, UA Negative Negative    Urobilinogen, UA Negative <2.0 EU/dL    Leukocytes, UA Negative Negative   POCT glucose   Result Value Ref Range    POCT Glucose <20 (LL) 70 - 110 mg/dL   POCT glucose   Result Value Ref Range    POCT Glucose 102 70 - 110 mg/dL     *Note: Due to a large number of results and/or encounters for the requested time period, some results have not been displayed. A complete set of results can be found in Results Review.         Imaging Results:  Imaging Results    None          The EKG was ordered, reviewed, and independently  interpreted by the ED provider.  Interpretation time: 15:24  Rate: 91 BPM  Rhythm: normal sinus rhythm  Interpretation: Right bundle branch block. Left anterior fascicular block. Bifascicular block. Minimal voltage criteria for LVH, may be normal variant. T wave abnormality, consider inferior ischemia. No STEMI.               The Emergency Provider reviewed the vital signs and test results, which are outlined above.     ED Discussion       4:28 PM: Discussed pt's case with Dr. Crawford (Nephrology) who states admission is not necessary and he will come to the ED to discuss outpatient management of potassium with the pt.    4:33 PM: Reassessed pt at this time. Discussed with pt all pertinent ED information and results. Discussed pt dx and plan of tx. Gave pt all f/u and return to the ED instructions. All questions and concerns were addressed at this time. Pt expresses understanding of information and instructions, and is comfortable with plan to discharge. Pt is stable for discharge.    I discussed with patient and/or family/caretaker that evaluation in the ED does not suggest any emergent or life threatening medical conditions requiring immediate intervention beyond what was provided in the ED, and I believe patient is safe for discharge.  Regardless, an unremarkable evaluation in the ED does not preclude the development or presence of a serious of life threatening condition. As such, patient was instructed to return immediately for any worsening or change in current symptoms.           Medical Decision Making  Differential diagnosis:  Renal failure, hyperkalemia, EKG abnormalities, medication side effect    Patient with elevated potassium at 6.4 yesterday.  EKG obtained here showing no T-wave peaking.  She is received calcium as well as insulin and dextrose.  Potassium come down to 5.1 she was evaluated in the ED by renal.  They recommend discharge home.  He has prescribe Lokelma for her as an outpatient will see her  as an outpatient.  Patient verbalized understanding agreement with the plan of care.  She did become hypoglycemic secondary to not eating here.  She receives sugar and was stable.  Mental status improved.  She was stable safe for discharge in my opinion.    Amount and/or Complexity of Data Reviewed  External Data Reviewed: labs and notes.  Labs: ordered. Decision-making details documented in ED Course.  ECG/medicine tests: ordered. Decision-making details documented in ED Course.    Risk  OTC drugs.  Prescription drug management.  Drug therapy requiring intensive monitoring for toxicity.  Decision regarding hospitalization.    Critical Care  Total time providing critical care: 35 minutes                ED Medication(s):  Medications   calcium gluconate 1 g in NS IVPB (premixed) (0 g Intravenous Stopped 11/15/23 1618)   dextrose 10 % infusion (0 g Intravenous Stopped 11/15/23 1534)   insulin regular injection 10 Units 0.1 mL (10 Units Intravenous Given 11/15/23 1519)   furosemide injection 60 mg (60 mg Intravenous Given 11/15/23 1512)       Discharge Medication List as of 11/15/2023  4:34 PM           Follow-up Information       Carter Crawford MD In 2 days.    Specialty: Nephrology  Contact information:  [id number]  [place] [address]  [phone]                                 Scribe Attestation:   Scribe #1: I performed the above scribed service and the documentation accurately describes the services I performed. I attest to the accuracy of the note.     Attending:   Physician Attestation Statement for Scribe #1: I, Gulshan Brush Jr., MD, personally performed the services described in this documentation, as scribed by Alina Glasgow, in my presence, and it is both accurate and complete.           Clinical Impression       ICD-10-CM ICD-9-CM   1. Hyperkalemia  E87.5 276.7       Disposition:   Disposition: Discharged  Condition: Stable         Gulshan Brush Jr., MD  11/15/23 [id number]       Trudi  Gulshan GORDON Jr., MD  11/15/23 4061

## 2023-11-15 NOTE — DISCHARGE INSTRUCTIONS
Lokelma as prescribed by your nephrologist intake as prescribed.  Low-potassium diet.  Follow-up with your renal doctors as an outpatient.  Return as needed

## 2023-11-15 NOTE — CONSULTS
[facility] - Emergency Dept.  Nephrology  Consult Note      Patient Name: Nadia Damon  MRN: [id number]  Admission Date: 11/15/2023  Hospital Length of Stay: 0 days  Attending Provider: Gulshan Brush Jr., MD   Primary Care Physician: Elis Wick MD  Principal Problem: hyperkalemia    Reason for consult: hyperkalemia, CKD    Consults  Subjective:     HPI: Thank you for referring the pt to us. H/o and chart were reviewed. Pt was seen and examined. 68-year-old female with a history of CKD stage 4, nephrolithiasis (likely mixed stones), and heart transplant in 1993 (is on cyclosporine), who was sent to ER for K 6.4. Pt has chronic hyperkalemia, which is mild most of the time. Pt follows in renal clinic and was seen by me 1 year ago and by Dr. Melgar more recently. 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 After the acute treatment of hyperkalemia, K has improved. Pt has no palpitation, no CP, no discomfort. Pt has a h/o of nephrotic syndrome, and for that reason she was previously on losartan, which together with lasix controlled proteinuria without causing elevated K. It seems losartan now has been stopped sometime ago.    Past Medical History:   Diagnosis Date    Abdominal wall hernia     CT Renal 6/11/2018---Small fat containing superior ventral abdominal wall hernia at the epicardial pacing lead site.    Abnormal mammogram 10/12/2021    GRACE (acute kidney injury) 11/22/2021    Anxiety     Arthritis     ZEN HIPS    Breast cancer in female 08/2021    LEFT BREAST    C. difficile colitis 11/29/2021    Cellulitis of axilla, left 12/23/2021    Chronic diastolic heart failure 12/16/2021    Chronic kidney disease     stage 4, GFR 15-29 ml/min    Chronic midline low back pain without sciatica 06/18/2018    Closed nondisplaced fracture of distal phalanx of left great toe with routine healing 10/22/2018     Coronary artery disease 1993    heart transplant    Cystitis 05/10/2022    Depression     Encounter for blood transfusion     Fibromyalgia     on Lyrica    Heart failure     native heart cardiomyopathy    Heart transplanted 1993    due to cardiomyopathy    History of hyperparathyroidism; Hyperparathyroidism, secondary renal     PT DENIES    Hypertension     Immune disorder     anti rejection meds    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    Impaired mobility 07/28/2022    Iron deficiency anemia 08/15/2017    Kidney stones     passed per pt    Obesity     Other osteoporosis without current pathological fracture 08/30/2019    Parotitis, acute 9/19/2023    Pharyngitis 1/9/2019    Severe sepsis 11/22/2021    Shingles 2003 approx    left leg    Subclinical hypothyroidism 06/16/2023    Thrombocytopenia, unspecified 11/29/2021    Trouble in sleeping     Urinary incontinence        Past Surgical History:   Procedure Laterality Date    BLADDER SURGERY  2015 approx    mesh - Dr Everett then 2nd reconstructive sx Dr Onofre    BREAST BIOPSY Bilateral     NEGATIVE    BREAST BIOPSY Right 10/31/2022    benign    BREAST LUMPECTOMY Left 2021    BREAST SURGERY Left 09/28/2015    Bx - benign    BREAST SURGERY Right 12/2015    Bx benign    CARDIAC PACEMAKER REMOVAL Left 06/26/2014    Pacer defirillator removed. Put in 1993 aat time of heart transplant    CARPAL TUNNEL RELEASE Left 03/03/2015    Dr. Hall    COLONOSCOPY N/A 02/25/2021    Procedure: COLONOSCOPY;  Surgeon: Freida Ramirez MD;  Location: [facility];  Service: Endoscopy;  Laterality: N/A;    CYSTOCELE REPAIR      Twice with mesh removal    EPIDURAL STEROID INJECTION INTO CERVICAL SPINE N/A 02/02/2023    Procedure: T11/T12 IL HELLEN;  Surgeon: Jassi Pierre MD;  Location: [facility];  Service: Pain Management;  Laterality: N/A;    HEART TRANSPLANT  1993    HERNIA REPAIR Right 1971 approx    Inguinal    HYSTERECTOMY  1983    vag hyst /LSO      INCISION AND DRAINAGE OF ABSCESS Left 12/24/2021    Procedure: INCISION AND DRAINAGE, ABSCESS;  Surgeon: Joseph Longo MD;  Location: [facility] OR;  Service: General;  Laterality: Left;    INJECTION OF ANESTHETIC AGENT AROUND MEDIAL BRANCH NERVES INNERVATING LUMBAR FACET JOINT Right 10/19/2022    Procedure: Right L4/L5 and L5/S1 MBB;  Surgeon: Jassi Pierre MD;  Location: [facility] PAIN MGT;  Service: Pain Management;  Laterality: Right;    INJECTION OF ANESTHETIC AGENT AROUND MEDIAL BRANCH NERVES INNERVATING LUMBAR FACET JOINT Right 11/09/2022    Procedure: Right L4/L5 and L5/S1 MBB;  Surgeon: Jassi Pierre MD;  Location: [facility] PAIN [facility]GT;  Service: Pain Management;  Laterality: Right;    INJECTION OF ANESTHETIC AGENT INTO SACROILIAC JOINT Right 08/22/2022    Procedure: Right SIJ Injection Right L5/S1 Facte Injection;  Surgeon: Jassi Pierre MD;  Location: [facility] PAIN MGT;  Service: Pain Management;  Laterality: Right;    INSERTION OF TUNNELED CENTRAL VENOUS CATHETER (CVC) WITH SUBCUTANEOUS PORT N/A 11/09/2021    Procedure: [id number];  Surgeon: Christoph Douglas MD;  Location: [facility] OR;  Service: General;  Laterality: N/A;    RADIOFREQUENCY THERMOCOAGULATION Right 12/07/2022    Procedure: Right L4/L5 and L5/S1 Lumbar RFA;  Surgeon: Jassi Pierre MD;  Location: [facility] PAIN [facility]GT;  Service: Pain Management;  Laterality: Right;    REMOVAL OF VASCULAR ACCESS PORT      ROBOT-ASSISTED LAPAROSCOPIC ABDOMINAL SACROCOLPOPEXY N/A 8/10/2023    Procedure: ROBOTIC SACROCOLPOPEXY, ABDOMEN;  Surgeon: PRANAY Villalobos MD;  Location: [facility] OR;  Service: OB/GYN;  Laterality: N/A;    ROBOT-ASSISTED LAPAROSCOPIC OOPHORECTOMY Right 8/10/2023    Procedure: ROBOTIC OOPHORECTOMY;  Surgeon: PRANAY Villalobos MD;  Location: [facility] OR;  Service: OB/GYN;  Laterality: Right;    SENTINEL LYMPH NODE BIOPSY Left 10/12/2021    Procedure: BIOPSY, LYMPH NODE, SENTINEL;  Surgeon: Christoph Douglas MD;  Location: [facility] OR;  Service: General;   Laterality: Left;    TOE SURGERY      XI ROBOTIC URETHROPEXY N/A 8/10/2023    Procedure: XI ROBOTIC URETHROPEXY;  Surgeon: PRANAY Villalobos MD;  Location: [facility];  Service: OB/GYN;  Laterality: N/A;       Review of patient's allergies indicates:   Allergen Reactions    Lisinopril Swelling and Rash    Augmentin [amoxicillin-pot clavulanate] Diarrhea    Zyvox [linezolid] Nausea And Vomiting     Current Facility-Administered Medications   Medication Frequency    acetaminophen tablet 1,000 mg On Call Procedure    famotidine tablet 20 mg On Call Procedure     Current Outpatient Medications   Medication    calcitonin, salmon, (FORTICAL) 200 unit/actuation nasal spray    carvediloL (COREG) 6.25 MG tablet    cycloSPORINE modified, NEORAL, (NEORAL) 25 MG capsule    EVENING PRIMROSE OIL ORAL    furosemide (LASIX) 20 MG tablet    guaiFENesin (MUCINEX) 600 mg 12 hr tablet    hydrALAZINE (APRESOLINE) 50 MG tablet    mirabegron (MYRBETRIQ) 50 mg Tb24    multivitamin capsule    NIFEdipine (PROCARDIA-XL) 30 MG (OSM) 24 hr tablet    pantoprazole (PROTONIX) 20 MG tablet    pregabalin (LYRICA) 50 MG capsule    psyllium husk (METAMUCIL ORAL)    temazepam (RESTORIL) 30 mg capsule    UNABLE TO FIND    vitamin E 400 UNIT capsule    zolpidem (AMBIEN) 10 mg Tab    biotin 10,000 mcg Cap    ergocalciferol (VITAMIN D2) 50,000 unit Cap    polyethylene glycol (GLYCOLAX) 17 gram/dose powder    RETACRIT 20,000 unit/mL injection    sodium zirconium cyclosilicate (LOKELMA) 5 gram packet     Family History       Problem Relation (Age of Onset)    Breast cancer Mother, Maternal Grandmother    Cancer Mother (38)    Cataracts Cousin    Heart disease Maternal Grandmother    Hypertension Son          Tobacco Use    Smoking status: Never    Smokeless tobacco: Never   Substance and Sexual Activity    Alcohol use: Never     Alcohol/week: 0.0 standard drinks of alcohol    Drug use: No    Sexual activity: Not Currently     Partners: Male     Birth  control/protection: See Surgical Hx     Review of Systems   Constitutional: Negative.    HENT: Negative.     Respiratory: Negative.     Cardiovascular: Negative.    Gastrointestinal: Negative.    Genitourinary: Negative.    Musculoskeletal: Negative.      Objective:     Vital Signs (Most Recent):  Temp: 98.6 °F (37 °C) (11/15/23 1311)  Pulse: 94 (11/15/23 1600)  Resp: 20 (11/15/23 1600)  BP: (!) 148/84 (11/15/23 1600)  SpO2: 100 % (11/15/23 1600) Vital Signs (24h Range):  Temp:  [98.6 °F (37 °C)] 98.6 °F (37 °C)  Pulse:  [87-97] 94  Resp:  [18-20] 20  SpO2:  [100 %] 100 %  BP: (111-170)/(67-91) 148/84     Weight: 72.3 kg (159 lb 4.5 oz) (11/15/23 1311)  Body mass index is 29.13 kg/m².  Body surface area is 1.78 meters squared.    No intake/output data recorded.     Physical Exam  Vitals and nursing note reviewed.   Constitutional:       Appearance: Normal appearance.   Cardiovascular:      Rate and Rhythm: Normal rate and regular rhythm.      Pulses: Normal pulses.      Heart sounds: Normal heart sounds.   Pulmonary:      Effort: Pulmonary effort is normal.      Breath sounds: Normal breath sounds.   Abdominal:      Palpations: Abdomen is soft.      Tenderness: There is no guarding.   Musculoskeletal:      Right lower leg: No edema.      Left lower leg: No edema.   Neurological:      Mental Status: She is alert and oriented to person, place, and time.   Psychiatric:         Behavior: Behavior normal.      Significant Labs: reviewed  BMP  Lab Results   Component Value Date     11/15/2023    K 5.2 (H) 11/15/2023     (H) 11/15/2023    CO2 16 (L) 11/15/2023    BUN 43 (H) 11/15/2023    CREATININE 3.3 (H) 11/15/2023    CALCIUM 8.8 11/15/2023    ANIONGAP 12 11/15/2023    EGFRNORACEVR 15 (A) 11/15/2023     Lab Results   Component Value Date    WBC 2.50 (L) 11/15/2023    HGB 9.5 (L) 11/15/2023    HCT 30.0 (L) 11/15/2023    MCV 89 11/15/2023     11/15/2023       Significant Imaging:  reviewed  Assessment/Plan:     ASSESSMENT AND PLAN: This is a 68-year-old female with CKD stage 4 who was sent to ER for hyperkalemia:         1. Renal: Hyperkalemia: multifactorial:  Due to advanced CKD and high K food  Also calcineurin inhibitors can raise K  Advised pt extensively to avoid K rich foods, has been eating a lot of tomatoes  Will prescribe lokelma 5 g po qod, sent to [facility]    CKD stage 4. s Cr stable at present, slowly progressive CKD  Kidney biopsy previously showed:  Damage by HTN (hypertensive nephrosclerosis) as well as calcineurin inhibitor toxicity due to taking cyclosporine to prevent heart transplant rejection. There was 50% chronic interstitial fibrosis.       PLANS AND RECOMMENDATIONS:   As discussed above  May d/c pt home  Arranged post ER f/u in renal clinic in 1-2 weeks  Opportunity for questions provided  Total time spent 70 minutes. Extensive time spent including the time to review the records, the patient evaluation, documentation, face-to-face  discussion with the patient. More than 50% of the time was spent in counseling  and coordination of care. Level V visit.  Discussed with ER.        Thank you for your consult.       Carter Crawford MD   Nephrology  [facility] - Emergency Dept.

## 2023-11-15 NOTE — FIRST PROVIDER EVALUATION
Medical screening examination initiated.  I have conducted a focused provider triage encounter, findings are as follows:    Brief history of present illness:  Patient presents with elevated potassium level    Vitals:    11/15/23 1311   BP: 111/67   BP Location: Right arm   Patient Position: Sitting   Pulse: 91   Resp: 20   Temp: 98.6 °F (37 °C)   TempSrc: Oral   SpO2: 100%   Weight: 72.3 kg (159 lb 4.5 oz)       Pertinent physical exam:  NAD    Brief workup plan:  MD to see      Preliminary workup initiated; this workup will be continued and followed by the physician or advanced practice provider that is assigned to the patient when roomed.

## 2023-11-15 NOTE — ASSESSMENT & PLAN NOTE
ASSESSMENT AND PLAN: This is a 67-year-old female who presents for CKD follow up:  Patient has a h/o of heart transplant  The impression is as follows:     1. Renal: s Cr overall stable, slowly progressive CKD  Stable renal function. Stage 4 CKD  Nephrotic syndrome: good response to losartan: proteinuria further improved from 6 to 2.2 g, and now to < 1 g  BP controlled      Complications of CKD:  K is normal to borderline elevated. Continue lasix.  Na normal  Acid base stable  Ca normal  PO4 normal  Secondary hyperparathyroidism: moderate. Will hold treatment with calcitriol, because in the past had hypercalcemia (and past h/o of kidney stones), and is currently on calcitonin to keep ca lower  Anemia:mild, multifactorial, seeing hem/onc     Kidney biopsy showed:  Damage by HTN (hypertensive nephrosclerosis) as well as calcineurin inhibitor toxicity due to taking cyclosporine to prevent heart transplant rejection. There was 50% chronic interstitial fibrosis.  Pt has been advised of the kidney biopsy in layman's language  She was specifically advised NOT to stop cyclosporine of change the dose on her own.     2. HTN: BP controlled  Reviewed meds with pt     3. Nephrolithiasis: no new stones. To review:  Has multiple risk factors for kidney stones: (hyperuricemia from cyclosporine, diet rich in uric acid, previously taking Vit C, vit D, and calcium,, CKD, and having primary hyperparathyroidism)  Stones are likely mixed ca oxalate and uric acid kidney stones  No longer taking Vit C  No longer takes Vit D and Ca.   Mild hyperuricemia, from cyclosporine.  F/u CT did not show any stones  U/s showed non-obstructive 2 R stones, relatively large  May have passed the stones  24 hour urine for kidney stone stratification shows hypocitraturia  Pt was advised NOT TO STOP cyclosporine.     4. History of heart transplant on cyclosporine  Per heart transplant team.  Do NOT stop cyclosporine      5. Elevated iPTH, likely has primary  hyperparathyroidism (HPT)  No adenomas found on sestamibi nulcear scan  May have diffuse hyperplasia  Primary HPT can explain increased risk of kidney stones     6. New diagnosis of breast cancer: since her last visit with us (she did have persistent mild hypercalcemia in the past). Hem/onc notes were reviewed. 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 Pt has had further complication, requiring hospital admission including pancytopenia, neutropenic fever, axillary cellulitis, and C diff colitis.  Will defer to hem/onc     7. Anemia: normocytic. Likely multifactorial: due to CKD and cancer.  Advised pt to have PRBC transfusion. Last transfusion was in Nov 2021. Pt declined  Has f/u with hem/onc on 1/20/21. Please evaluate for epogen and IV iron therapy.              PLANS AND RECOMMENDATIONS:   As discussed above  Opportunity for questions provided  Complicated case, multiple issues addressed  RTC 6 months  Total time spent 40 minutes. Extensive time spent including the time to review the records, the patient evaluation, documentation, face-to-face  discussion with the patient. More than 50% of the time was spent in counseling  and coordination of care. Level V visit.

## 2023-11-15 NOTE — SUBJECTIVE & OBJECTIVE
Past Medical History:   Diagnosis Date    Abdominal wall hernia     CT Renal 6/11/2018---Small fat containing superior ventral abdominal wall hernia at the epicardial pacing lead site.    Abnormal mammogram 10/12/2021    GRACE (acute kidney injury) 11/22/2021    Anxiety     Arthritis     ZEN HIPS    Breast cancer in female 08/2021    LEFT BREAST    C. difficile colitis 11/29/2021    Cellulitis of axilla, left 12/23/2021    Chronic diastolic heart failure 12/16/2021    Chronic kidney disease     stage 4, GFR 15-29 ml/min    Chronic midline low back pain without sciatica 06/18/2018    Closed nondisplaced fracture of distal phalanx of left great toe with routine healing 10/22/2018    Coronary artery disease 1993    heart transplant    Cystitis 05/10/2022    Depression     Encounter for blood transfusion     Fibromyalgia     on Lyrica    Heart failure     native heart cardiomyopathy    Heart transplanted 1993    due to cardiomyopathy    History of hyperparathyroidism; Hyperparathyroidism, secondary renal     PT DENIES    Hypertension     Immune disorder     anti rejection meds    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    Impaired mobility 07/28/2022    Iron deficiency anemia 08/15/2017    Kidney stones     passed per pt    Obesity     Other osteoporosis without current pathological fracture 08/30/2019    Parotitis, acute 9/19/2023    Pharyngitis 1/9/2019    Severe sepsis 11/22/2021    Shingles 2003 approx    left leg    Subclinical hypothyroidism 06/16/2023    Thrombocytopenia, unspecified 11/29/2021    Trouble in sleeping     Urinary incontinence        Past Surgical History:   Procedure Laterality Date    BLADDER SURGERY  2015 approx    mesh - Dr Everett then 2nd reconstructive sx Dr Onofre    BREAST BIOPSY Bilateral     NEGATIVE    BREAST BIOPSY Right 10/31/2022    benign    BREAST LUMPECTOMY Left 2021    BREAST SURGERY Left 09/28/2015    Bx - benign    BREAST SURGERY Right 12/2015    Bx benign     CARDIAC PACEMAKER REMOVAL Left 06/26/2014    Pacer defirillator removed. Put in 1993 aat time of heart transplant    CARPAL TUNNEL RELEASE Left 03/03/2015    Dr. Hall    COLONOSCOPY N/A 02/25/2021    Procedure: COLONOSCOPY;  Surgeon: Freida Ramirez MD;  Location: [facility] ENDO;  Service: Endoscopy;  Laterality: N/A;    CYSTOCELE REPAIR      Twice with mesh removal    EPIDURAL STEROID INJECTION INTO CERVICAL SPINE N/A 02/02/2023    Procedure: T11/T12 IL HELLEN;  Surgeon: Jassi Pierre MD;  Location: [facility] PAIN MGT;  Service: Pain Management;  Laterality: N/A;    HEART TRANSPLANT  1993    HERNIA REPAIR Right 1971 approx    Inguinal    HYSTERECTOMY  1983    vag hyst /LSO     INCISION AND DRAINAGE OF ABSCESS Left 12/24/2021    Procedure: INCISION AND DRAINAGE, ABSCESS;  Surgeon: Joseph Longo MD;  Location: [facility] OR;  Service: General;  Laterality: Left;    INJECTION OF ANESTHETIC AGENT AROUND MEDIAL BRANCH NERVES INNERVATING LUMBAR FACET JOINT Right 10/19/2022    Procedure: Right L4/L5 and L5/S1 MBB;  Surgeon: Jassi Pierre MD;  Location: [facility] PAIN MGT;  Service: Pain Management;  Laterality: Right;    INJECTION OF ANESTHETIC AGENT AROUND MEDIAL BRANCH NERVES INNERVATING LUMBAR FACET JOINT Right 11/09/2022    Procedure: Right L4/L5 and L5/S1 MBB;  Surgeon: Jassi Pierre MD;  Location: [facility] PAIN MGT;  Service: Pain Management;  Laterality: Right;    INJECTION OF ANESTHETIC AGENT INTO SACROILIAC JOINT Right 08/22/2022    Procedure: Right SIJ Injection Right L5/S1 Facte Injection;  Surgeon: Jassi Pierre MD;  Location: [facility] PAIN MGT;  Service: Pain Management;  Laterality: Right;    INSERTION OF TUNNELED CENTRAL VENOUS CATHETER (CVC) WITH SUBCUTANEOUS PORT N/A 11/09/2021    Procedure: [id number];  Surgeon: Christoph Douglas MD;  Location: [facility] OR;  Service: General;  Laterality: N/A;    RADIOFREQUENCY THERMOCOAGULATION Right 12/07/2022    Procedure: Right L4/L5 and L5/S1 Lumbar RFA;  Surgeon: Jassi  MD Ignacio;  Location: [facility] PAIN MGT;  Service: Pain Management;  Laterality: Right;    REMOVAL OF VASCULAR ACCESS PORT      ROBOT-ASSISTED LAPAROSCOPIC ABDOMINAL SACROCOLPOPEXY N/A 8/10/2023    Procedure: ROBOTIC SACROCOLPOPEXY, ABDOMEN;  Surgeon: PRANAY Villalobos MD;  Location: [facility] OR;  Service: OB/GYN;  Laterality: N/A;    ROBOT-ASSISTED LAPAROSCOPIC OOPHORECTOMY Right 8/10/2023    Procedure: ROBOTIC OOPHORECTOMY;  Surgeon: PRANAY Villalobos MD;  Location: [facility] OR;  Service: OB/GYN;  Laterality: Right;    SENTINEL LYMPH NODE BIOPSY Left 10/12/2021    Procedure: BIOPSY, LYMPH NODE, SENTINEL;  Surgeon: Christoph Douglas MD;  Location: [facility] OR;  Service: General;  Laterality: Left;    TOE SURGERY      XI ROBOTIC URETHROPEXY N/A 8/10/2023    Procedure: XI ROBOTIC URETHROPEXY;  Surgeon: PRANAY Villalobos MD;  Location: [facility] OR;  Service: OB/GYN;  Laterality: N/A;       Review of patient's allergies indicates:   Allergen Reactions    Lisinopril Swelling and Rash    Augmentin [amoxicillin-pot clavulanate] Diarrhea    Zyvox [linezolid] Nausea And Vomiting     Current Facility-Administered Medications   Medication Frequency    acetaminophen tablet 1,000 mg On Call Procedure    famotidine tablet 20 mg On Call Procedure     Current Outpatient Medications   Medication    calcitonin, salmon, (FORTICAL) 200 unit/actuation nasal spray    carvediloL (COREG) 6.25 MG tablet    cycloSPORINE modified, NEORAL, (NEORAL) 25 MG capsule    EVENING PRIMROSE OIL ORAL    furosemide (LASIX) 20 MG tablet    guaiFENesin (MUCINEX) 600 mg 12 hr tablet    hydrALAZINE (APRESOLINE) 50 MG tablet    mirabegron (MYRBETRIQ) 50 mg Tb24    multivitamin capsule    NIFEdipine (PROCARDIA-XL) 30 MG (OSM) 24 hr tablet    pantoprazole (PROTONIX) 20 MG tablet    pregabalin (LYRICA) 50 MG capsule    psyllium husk (METAMUCIL ORAL)    temazepam (RESTORIL) 30 mg capsule    UNABLE TO FIND    vitamin E 400 UNIT capsule    zolpidem (AMBIEN) 10 mg Tab     biotin 10,000 mcg Cap    ergocalciferol (VITAMIN D2) 50,000 unit Cap    polyethylene glycol (GLYCOLAX) 17 gram/dose powder    RETACRIT 20,000 unit/mL injection    sodium zirconium cyclosilicate (LOKELMA) 5 gram packet     Family History       Problem Relation (Age of Onset)    Breast cancer Mother, Maternal Grandmother    Cancer Mother (38)    Cataracts Cousin    Heart disease Maternal Grandmother    Hypertension Son          Tobacco Use    Smoking status: Never    Smokeless tobacco: Never   Substance and Sexual Activity    Alcohol use: Never     Alcohol/week: 0.0 standard drinks of alcohol    Drug use: No    Sexual activity: Not Currently     Partners: Male     Birth control/protection: See Surgical Hx     Review of Systems   Constitutional: Negative.    HENT: Negative.     Respiratory: Negative.     Cardiovascular: Negative.    Gastrointestinal: Negative.    Genitourinary: Negative.    Musculoskeletal: Negative.      Objective:     Vital Signs (Most Recent):  Temp: 98.6 °F (37 °C) (11/15/23 1311)  Pulse: 94 (11/15/23 1600)  Resp: 20 (11/15/23 1600)  BP: (!) 148/84 (11/15/23 1600)  SpO2: 100 % (11/15/23 1600) Vital Signs (24h Range):  Temp:  [98.6 °F (37 °C)] 98.6 °F (37 °C)  Pulse:  [87-97] 94  Resp:  [18-20] 20  SpO2:  [100 %] 100 %  BP: (111-170)/(67-91) 148/84     Weight: 72.3 kg (159 lb 4.5 oz) (11/15/23 1311)  Body mass index is 29.13 kg/m².  Body surface area is 1.78 meters squared.    No intake/output data recorded.     Physical Exam  Vitals and nursing note reviewed.   Constitutional:       Appearance: Normal appearance.   Cardiovascular:      Rate and Rhythm: Normal rate and regular rhythm.      Pulses: Normal pulses.      Heart sounds: Normal heart sounds.   Pulmonary:      Effort: Pulmonary effort is normal.      Breath sounds: Normal breath sounds.   Abdominal:      Palpations: Abdomen is soft.      Tenderness: There is no guarding.   Musculoskeletal:      Right lower leg: No edema.      Left lower  leg: No edema.   Neurological:      Mental Status: She is alert and oriented to person, place, and time.   Psychiatric:         Behavior: Behavior normal.      Significant Labs: reviewed  BMP  Lab Results   Component Value Date     11/15/2023    K 5.2 (H) 11/15/2023     (H) 11/15/2023    CO2 16 (L) 11/15/2023    BUN 43 (H) 11/15/2023    CREATININE 3.3 (H) 11/15/2023    CALCIUM 8.8 11/15/2023    ANIONGAP 12 11/15/2023    EGFRNORACEVR 15 (A) 11/15/2023     Lab Results   Component Value Date    WBC 2.50 (L) 11/15/2023    HGB 9.5 (L) 11/15/2023    HCT 30.0 (L) 11/15/2023    MCV 89 11/15/2023     11/15/2023       Significant Imaging: reviewed

## 2023-11-15 NOTE — TELEPHONE ENCOUNTER
----- Message from Luz Dickson NP sent at 11/15/2023  7:48 AM CST -----  Please repeat RFP and get Ua with C&S ASAP. Stop pantoprazole.

## 2023-11-15 NOTE — HPI
Thank you for referring the pt to us. H/o and chart were reviewed. Pt was seen and examined. 68-year-old female with a history of CKD stage 4, nephrolithiasis (likely mixed stones), and heart transplant in 1993 (is on cyclosporine), who was sent to ER for K 6.4. Pt has chronic hyperkalemia, which is mild most of the time. Pt follows in renal clinic and was seen by me 1 year ago and by Dr. Melgar more recently. 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 After the acute treatment of hyperkalemia, K has improved. Pt has no palpitation, no CP, no discomfort. Pt has a h/o of nephrotic syndrome, and for that reason she was previously on losartan, which together with lasix controlled proteinuria without causing elevated K. It seems losartan now has been stopped sometime ago.

## 2023-11-15 NOTE — PHARMACY MED REC
"Admission Medication History     The home medication history was taken by Ning Nolasco.    You may go to "Admission" then "Reconcile Home Medications" tabs to review and/or act upon these items.     The home medication list has been updated by the Pharmacy department.   Please read ALL comments highlighted in yellow.   Please address this information as you see fit.    Feel free to contact us if you have any questions or require assistance.      The medications listed below were removed from the home medication list. Please reorder if appropriate:  Patient reports no longer taking the following medication(s):  Famotidine 20mg        Medications listed below were obtained from: Patient/family and Analytic software- [facility],patient brought medication list from home  (Not in a hospital admission)      LAST MED REC COMPLETED:     Ning Nolasco  [id number]      Current Outpatient Medications on File Prior to Encounter   Medication Sig Dispense Refill Last Dose    calcitonin, salmon, (FORTICAL) 200 unit/actuation nasal spray 1 spray by Nasal route once daily. 3 each 3 11/14/2023    carvediloL (COREG) 6.25 MG tablet Take 1 tablet (6.25 mg total) by mouth 2 (two) times daily with meals. 180 tablet 3 11/14/2023    cycloSPORINE modified, NEORAL, (NEORAL) 25 MG capsule Take 3 capsules (75 mg total) by mouth 2 (two) times daily. 540 capsule 1 11/15/2023    EVENING PRIMROSE OIL ORAL Take 1,000 mg by mouth once daily.   11/14/2023    furosemide (LASIX) 20 MG tablet Take 1 tablet (20 mg total) by mouth once daily. 90 tablet 1 11/15/2023    guaiFENesin (MUCINEX) 600 mg 12 hr tablet Take 1,200 mg by mouth as needed.   11/14/2023    hydrALAZINE (APRESOLINE) 50 MG tablet Take 1 tablet (50 mg total) by mouth every 8 (eight) hours. 270 tablet 3 11/15/2023    mirabegron (MYRBETRIQ) 50 mg Tb24 Take 1 tablet (50 mg total) by mouth once daily. 30 tablet 11 11/14/2023    multivitamin capsule Take 1 capsule by mouth once daily.   " 11/14/2023    NIFEdipine (PROCARDIA-XL) 30 MG (OSM) 24 hr tablet Take 1 tablet (30 mg total) by mouth once daily. 30 tablet 11 11/15/2023    pantoprazole (PROTONIX) 20 MG tablet Take 1 tablet (20 mg total) by mouth once daily. 30 tablet 11 11/14/2023    pregabalin (LYRICA) 50 MG capsule Take 1 capsule (50 mg total) by mouth 2 (two) times daily. 180 capsule 1 11/14/2023    psyllium husk (METAMUCIL ORAL) Take 17 g by mouth once daily.   11/14/2023    temazepam (RESTORIL) 30 mg capsule Take 1 capsule (30 mg total) by mouth nightly as needed for Insomnia. 30 capsule 5 11/14/2023    UNABLE TO FIND medication name: Stool softener (Ducolax) Laxative   11/14/2023    vitamin E 400 UNIT capsule Take 400 Units by mouth once daily.   11/14/2023    zolpidem (AMBIEN) 10 mg Tab Take 1 tablet (10 mg total) by mouth nightly as needed (insomnia). 90 tablet 0 11/14/2023    biotin 10,000 mcg Cap Take 1 tablet by mouth once daily.       ergocalciferol (VITAMIN D2) 50,000 unit Cap Take 50,000 Units by mouth every 7 days.   Unknown    polyethylene glycol (GLYCOLAX) 17 gram/dose powder Take 17 g by mouth once daily.       RETACRIT 20,000 unit/mL injection                               .

## 2023-11-16 ENCOUNTER — OFFICE VISIT (OUTPATIENT)
Dept: PRIMARY CARE CLINIC | Facility: CLINIC | Age: 68
End: 2023-11-16
Payer: MEDICARE

## 2023-11-16 ENCOUNTER — PATIENT OUTREACH (OUTPATIENT)
Dept: ADMINISTRATIVE | Facility: OTHER | Age: 68
End: 2023-11-16
Payer: MEDICARE

## 2023-11-16 VITALS
HEART RATE: 97 BPM | HEIGHT: 62 IN | BODY MASS INDEX: 28.91 KG/M2 | WEIGHT: 157.13 LBS | SYSTOLIC BLOOD PRESSURE: 120 MMHG | TEMPERATURE: 99 F | OXYGEN SATURATION: 99 % | DIASTOLIC BLOOD PRESSURE: 68 MMHG

## 2023-11-16 DIAGNOSIS — N17.9 AKI (ACUTE KIDNEY INJURY): Primary | ICD-10-CM

## 2023-11-16 DIAGNOSIS — E87.5 HYPERKALEMIA: Primary | ICD-10-CM

## 2023-11-16 DIAGNOSIS — E87.5 HYPERKALEMIA: ICD-10-CM

## 2023-11-16 PROCEDURE — 1157F PR ADVANCE CARE PLAN OR EQUIV PRESENT IN MEDICAL RECORD: ICD-10-PCS | Mod: HCNC,CPTII,S$GLB, | Performed by: NURSE PRACTITIONER

## 2023-11-16 PROCEDURE — 3044F HG A1C LEVEL LT 7.0%: CPT | Mod: HCNC,CPTII,S$GLB, | Performed by: NURSE PRACTITIONER

## 2023-11-16 PROCEDURE — 99999 PR PBB SHADOW E&M-EST. PATIENT-LVL V: ICD-10-PCS | Mod: PBBFAC,HCNC,, | Performed by: NURSE PRACTITIONER

## 2023-11-16 PROCEDURE — 1157F ADVNC CARE PLAN IN RCRD: CPT | Mod: HCNC,CPTII,S$GLB, | Performed by: NURSE PRACTITIONER

## 2023-11-16 PROCEDURE — 3074F PR MOST RECENT SYSTOLIC BLOOD PRESSURE < 130 MM HG: ICD-10-PCS | Mod: HCNC,CPTII,S$GLB, | Performed by: NURSE PRACTITIONER

## 2023-11-16 PROCEDURE — 1101F PT FALLS ASSESS-DOCD LE1/YR: CPT | Mod: HCNC,CPTII,S$GLB, | Performed by: NURSE PRACTITIONER

## 2023-11-16 PROCEDURE — 3008F BODY MASS INDEX DOCD: CPT | Mod: HCNC,CPTII,S$GLB, | Performed by: NURSE PRACTITIONER

## 2023-11-16 PROCEDURE — 3044F PR MOST RECENT HEMOGLOBIN A1C LEVEL <7.0%: ICD-10-PCS | Mod: HCNC,CPTII,S$GLB, | Performed by: NURSE PRACTITIONER

## 2023-11-16 PROCEDURE — 3066F PR DOCUMENTATION OF TREATMENT FOR NEPHROPATHY: ICD-10-PCS | Mod: HCNC,CPTII,S$GLB, | Performed by: NURSE PRACTITIONER

## 2023-11-16 PROCEDURE — 1126F AMNT PAIN NOTED NONE PRSNT: CPT | Mod: HCNC,CPTII,S$GLB, | Performed by: NURSE PRACTITIONER

## 2023-11-16 PROCEDURE — 3078F DIAST BP <80 MM HG: CPT | Mod: HCNC,CPTII,S$GLB, | Performed by: NURSE PRACTITIONER

## 2023-11-16 PROCEDURE — 3288F FALL RISK ASSESSMENT DOCD: CPT | Mod: HCNC,CPTII,S$GLB, | Performed by: NURSE PRACTITIONER

## 2023-11-16 PROCEDURE — 1159F MED LIST DOCD IN RCRD: CPT | Mod: HCNC,CPTII,S$GLB, | Performed by: NURSE PRACTITIONER

## 2023-11-16 PROCEDURE — 1101F PR PT FALLS ASSESS DOC 0-1 FALLS W/OUT INJ PAST YR: ICD-10-PCS | Mod: HCNC,CPTII,S$GLB, | Performed by: NURSE PRACTITIONER

## 2023-11-16 PROCEDURE — 3008F PR BODY MASS INDEX (BMI) DOCUMENTED: ICD-10-PCS | Mod: HCNC,CPTII,S$GLB, | Performed by: NURSE PRACTITIONER

## 2023-11-16 PROCEDURE — 3074F SYST BP LT 130 MM HG: CPT | Mod: HCNC,CPTII,S$GLB, | Performed by: NURSE PRACTITIONER

## 2023-11-16 PROCEDURE — 99215 PR OFFICE/OUTPT VISIT, EST, LEVL V, 40-54 MIN: ICD-10-PCS | Mod: HCNC,S$GLB,, | Performed by: NURSE PRACTITIONER

## 2023-11-16 PROCEDURE — 99999 PR PBB SHADOW E&M-EST. PATIENT-LVL V: CPT | Mod: PBBFAC,HCNC,, | Performed by: NURSE PRACTITIONER

## 2023-11-16 PROCEDURE — 1159F PR MEDICATION LIST DOCUMENTED IN MEDICAL RECORD: ICD-10-PCS | Mod: HCNC,CPTII,S$GLB, | Performed by: NURSE PRACTITIONER

## 2023-11-16 PROCEDURE — 99215 OFFICE O/P EST HI 40 MIN: CPT | Mod: HCNC,S$GLB,, | Performed by: NURSE PRACTITIONER

## 2023-11-16 PROCEDURE — 1126F PR PAIN SEVERITY QUANTIFIED, NO PAIN PRESENT: ICD-10-PCS | Mod: HCNC,CPTII,S$GLB, | Performed by: NURSE PRACTITIONER

## 2023-11-16 PROCEDURE — 3066F NEPHROPATHY DOC TX: CPT | Mod: HCNC,CPTII,S$GLB, | Performed by: NURSE PRACTITIONER

## 2023-11-16 PROCEDURE — 3078F PR MOST RECENT DIASTOLIC BLOOD PRESSURE < 80 MM HG: ICD-10-PCS | Mod: HCNC,CPTII,S$GLB, | Performed by: NURSE PRACTITIONER

## 2023-11-16 PROCEDURE — 3288F PR FALLS RISK ASSESSMENT DOCUMENTED: ICD-10-PCS | Mod: HCNC,CPTII,S$GLB, | Performed by: NURSE PRACTITIONER

## 2023-11-16 NOTE — LETTER
November 16, 2023    Nadia Damon  [address] [facility]  [address]  [place] [address]             [facility] [place]  [facility]  [address]  [facility] [place] [address]  Phone: [phone]  Fax: [phone]   November 16, 2023     Patient: Nadia Damon   YOB: 1955   Date of Visit: 11/16/2023       To Whom it May Concern:    Nadia Damon was seen in my clinic on 11/16/2023. She has multiple serious illnesses including acute renal failure and she is a heart transplant patient. I don not recommend she travel within the next few months at minimum to allow her health to stabilize. Please take this into consideration with decisions regarding her travel plans.     If you have any questions or concerns, please don't hesitate to call.    Sincerely,         Luz Dickson, NP

## 2023-11-16 NOTE — ASSESSMENT & PLAN NOTE
Insurance doesn't cover Lokelma.   Will change to Veltassa and message nephrologist.   RFP tomorrow.

## 2023-11-16 NOTE — PROGRESS NOTES
Nadia Damon  11/16/2023  [id number]    Elis Wick MD  Patient Care Team:  Elis Wick MD as PCP - General (Internal Medicine)  Miguel Soni Jr., MD as Consulting Physician (Vascular Surgery)  Ike King MD as Consulting Physician (Cardiology)  Courtney Tubbs MD as Consulting Physician (Cardiology)  Carter Crawford MD as Consulting Physician (Nephrology)  Paxton Vasques OD as Consulting Physician (Optometry)  PRANAY Villalobos MD as Obstetrician (Obstetrics)  Parker Mccarthy IV, MD (Urology)  Ike King MD as Consulting Physician (Cardiology)  Jose Roland MD as Consulting Physician (Dermatology)  Prasanth Johnson MD as Consulting Physician (Rheumatology)  Nora Morin LCSW as   Elis Wick MD as Physician (Internal Medicine)  Lexi Bianchi LCSW as  (Hematology and Oncology)  Candace Saleh LMSW as  (Hematology and Oncology)  Jacobs, Kymeesha [facility] Note      Chief Complaint:  Chief Complaint   Patient presents with    Follow-up     ER follow up/ left hand and toe pain       History of Present Illness:  HPI    ER follow up.     Sent to ER yesterday for hyperkalemia and GRACE. Serum K+ 6.4, recent tx for cystitis by urology. Once ER after tx repeat K+ 5.2 and creatinine down to 3.3. Prescribed Lokelma. Had syncopal episode in ER, attributes to hypoglycemia.     Feels fatigued. Discharged from ER at 6 am.   No dyspnea, CP, palpitations.   No new urinary sx, still incontinence.    Review of Systems   Constitutional:  Positive for fatigue. Negative for activity change, appetite change, diaphoresis and fever.   HENT:  Negative for congestion.    Respiratory:  Negative for chest tightness and shortness of breath.    Cardiovascular:  Positive for leg swelling. Negative for chest pain and palpitations.   Gastrointestinal:  Negative for abdominal pain, nausea and vomiting.   Genitourinary:  Negative for  dysuria and flank pain.         The following were reviewed: Active problem list, medication list, allergies, family history, social history, and Health Maintenance.       Medications:  Current Outpatient Medications on File Prior to Visit   Medication Sig Dispense Refill    biotin 10,000 mcg Cap Take 1 tablet by mouth once daily.      cycloSPORINE modified, NEORAL, (NEORAL) 25 MG capsule Take 3 capsules (75 mg total) by mouth 2 (two) times daily. 540 capsule 1    ergocalciferol (VITAMIN D2) 50,000 unit Cap Take 50,000 Units by mouth every 7 days.      EVENING PRIMROSE OIL ORAL Take 1,000 mg by mouth once daily.      furosemide (LASIX) 20 MG tablet Take 1 tablet (20 mg total) by mouth once daily. 90 tablet 1    guaiFENesin (MUCINEX) 600 mg 12 hr tablet Take 1,200 mg by mouth as needed.      hydrALAZINE (APRESOLINE) 50 MG tablet Take 1 tablet (50 mg total) by mouth every 8 (eight) hours. 270 tablet 3    multivitamin capsule Take 1 capsule by mouth once daily.      NIFEdipine (PROCARDIA-XL) 30 MG (OSM) 24 hr tablet Take 1 tablet (30 mg total) by mouth once daily. 30 tablet 11    pantoprazole (PROTONIX) 20 MG tablet Take 1 tablet (20 mg total) by mouth once daily. 30 tablet 11    polyethylene glycol (GLYCOLAX) 17 gram/dose powder Take 17 g by mouth once daily.      pregabalin (LYRICA) 50 MG capsule Take 1 capsule (50 mg total) by mouth 2 (two) times daily. 180 capsule 1    RETACRIT 20,000 unit/mL injection       temazepam (RESTORIL) 30 mg capsule Take 1 capsule (30 mg total) by mouth nightly as needed for Insomnia. 30 capsule 5    vitamin E 400 UNIT capsule Take 400 Units by mouth once daily.      zolpidem (AMBIEN) 10 mg Tab Take 1 tablet (10 mg total) by mouth nightly as needed (insomnia). 90 tablet 0    [DISCONTINUED] mirabegron (MYRBETRIQ) 50 mg Tb24 Take 1 tablet (50 mg total) by mouth once daily. 30 tablet 11    calcitonin, salmon, (FORTICAL) 200 unit/actuation nasal spray 1 spray by Nasal route once daily. 3 each  3    carvediloL (COREG) 6.25 MG tablet Take 1 tablet (6.25 mg total) by mouth 2 (two) times daily with meals. 180 tablet 3    psyllium husk (METAMUCIL ORAL) Take 17 g by mouth once daily.      UNABLE TO FIND medication name: Stool softener (Ducolax) Laxative      [DISCONTINUED] sodium zirconium cyclosilicate (LOKELMA) 5 gram packet Take 1 packet (5 g total) by mouth every other day. Mix entire contents of packet(s) into drinking glass containing 3 tablespoons of water; stir well and drink immediately. Add water and repeat until no powder remains to receive entire dose. 15 packet 11     Current Facility-Administered Medications on File Prior to Visit   Medication Dose Route Frequency Provider Last Rate Last Admin    acetaminophen tablet 1,000 mg  1,000 mg Oral On Call Procedure PRANAY Villalobos MD        famotidine tablet 20 mg  20 mg Oral On Call Procedure PRANAY Villalobos MD           Medications have been reviewed and reconciled with patient at visit today.    Barriers to medications present (no )    Fall since last office visit (no )      Exam:  Vitals:    11/16/23 1429   BP: 120/68   Pulse: 97   Temp: 99.1 °F (37.3 °C)     Weight: 71.3 kg (157 lb 1.6 oz)   Body mass index is 28.73 kg/m².      BP Readings from Last 3 Encounters:   11/16/23 120/68   11/15/23 124/66   11/14/23 (!) 151/80     Wt Readings from Last 3 Encounters:   11/16/23 1429 71.3 kg (157 lb 1.6 oz)   11/15/23 1311 72.3 kg (159 lb 4.5 oz)   11/02/23 1354 72.5 kg (159 lb 14.4 oz)            Physical Exam  Constitutional:       General: She is not in acute distress.  HENT:      Head: Normocephalic.      Nose: Nose normal.      Mouth/Throat:      Mouth: Mucous membranes are moist.      Pharynx: No oropharyngeal exudate or posterior oropharyngeal erythema.   Eyes:      General: No scleral icterus.  Cardiovascular:      Rate and Rhythm: Normal rate and regular rhythm.      Heart sounds: Murmur heard.   Pulmonary:      Effort: Pulmonary effort is  normal.      Breath sounds: Normal breath sounds.   Abdominal:      General: Bowel sounds are normal. There is no distension.      Palpations: Abdomen is soft.      Tenderness: There is no abdominal tenderness. There is no right CVA tenderness or left CVA tenderness.   Musculoskeletal:         General: Swelling (1+ BLE) present.      Cervical back: Neck supple.   Skin:     General: Skin is warm and dry.      Coloration: Skin is not jaundiced or pale.   Neurological:      Mental Status: She is alert. Mental status is at baseline.      Gait: Gait normal.   Psychiatric:         Mood and Affect: Mood normal.         Behavior: Behavior normal.         Thought Content: Thought content normal.         Laboratory Reviewed: (Yes)  Lab Results   Component Value Date    WBC 2.50 (L) 11/15/2023    HGB 9.5 (L) 11/15/2023    HCT 30.0 (L) 11/15/2023     11/15/2023    CHOL 156 09/19/2023    TRIG 176 (H) 09/19/2023    HDL 34 (L) 09/19/2023    ALT 15 11/15/2023    AST 29 11/15/2023     11/15/2023    K 5.2 (H) 11/15/2023     (H) 11/15/2023    CREATININE 3.3 (H) 11/15/2023    BUN 43 (H) 11/15/2023    CO2 16 (L) 11/15/2023    TSH 7.542 (H) 11/14/2023    PSA <0.1 05/27/2008    INR 1.0 11/22/2021    HGBA1C 5.1 03/08/2023           Health Maintenance  Health Maintenance Topics with due status: Not Due       Topic Last Completion Date    TETANUS VACCINE 09/09/2020    Colorectal Cancer Screening 02/25/2021    DEXA Scan 01/25/2023    Hemoglobin A1c (Diabetic Prevention Screening) 03/08/2023    Mammogram 04/10/2023    Lipid Panel 09/19/2023     Health Maintenance Due   Topic Date Due    RSV Vaccine (Age 60+) (1 - 1-dose 60+ series) Never done    COVID-19 Vaccine (4 - 2023-24 season) 09/01/2023    Pneumococcal Vaccines (Age 65+) (3 - PPSV23 or PCV20) 10/22/2023           Assessment:  Problem List Items Addressed This Visit          Renal/    Hyperkalemia     Insurance doesn't cover Lokelma.   Will change to Veltassa and  message nephrologist.   RFP tomorrow.          Relevant Medications    patiromer calcium sorbitex (VELTASSA) 8.4 gram PwPk    GRACE (acute kidney injury) - Primary     Repeat RFP tomorrow.   Hold myrebetriq for now.  Avoid nephrotoxins and renal dose medications.          Relevant Orders    RENAL FUNCTION PANEL         Plan:  GRACE (acute kidney injury)  -     RENAL FUNCTION PANEL; Future; Expected date: 11/16/2023    Hyperkalemia  -     patiromer calcium sorbitex (VELTASSA) 8.4 gram PwPk; Take 1 packet (8.4 g total) by mouth once daily. for 90 doses  Dispense: 30 packet; Refill: 2      -Patient's lab results were reviewed and discussed with patient  -Treatment options and alternatives were discussed with the patient. Patient expressed understanding. Patient was given the opportunity to ask questions and be an active participant in their medical care. Patient had no further questions or concerns at this time.   -Documentation of patient's health and condition was obtained from family member who was present during visit.  -Patient is an overall moderate risk for health complications from their medical conditions.       Follow up: Follow up in about 2 weeks (around 11/30/2023).      After visit summary printed and given to patient upon discharge.  Patient goals and care plan are included in After visit summary.    Total medical decision making time was 42 min.  The following issues were discussed: The primary encounter diagnosis was GRACE (acute kidney injury). A diagnosis of Hyperkalemia was also pertinent to this visit.    Health maintenance needs, recent test results and goals of care discussed with pt and questions answered.

## 2023-11-16 NOTE — Clinical Note
Good afternoon - Her insurance doesn't cover Lokelma so I prescribed Veltassa 8.4g daily instead. Please let me know if you have other recommendations. Thank you

## 2023-11-16 NOTE — PATIENT INSTRUCTIONS
If you are feeling unwell, we'd like to be the first ones to know here at [facility]! Please give us a call. Same day appointments are our top priority to keep you well and out of the emergency rooms and hospitals. Call [phone] for our direct line. After hours advice is always available. Please call [phone] after hours to speak to the on-call team.      Stop Myrebetriq for now. Will prescribe lower dose once renal function improves.     Lab Monday.    If insurance will not pay for Lokelma (medicine for elevated potassium) call us or Dr Crawford right away so we can prescribe the alternative.

## 2023-11-16 NOTE — PROGRESS NOTES
CHW - Initial Contact    This Community Health Worker completed the Social Determinant of Health questionnaire with patient during clinic visit today.    Pt identified barriers of most importance are none at this time.   Referrals to community agencies completed with patient consent outside of [facility] include: No outside referral sat this time.  Referrals were put through [facility] - no  Support and Services: None  Other information discussed the patient needs help with: No other information was discussed.   Follow up required: No  No future outreach task assigned

## 2023-11-17 ENCOUNTER — TELEPHONE (OUTPATIENT)
Dept: NEPHROLOGY | Facility: CLINIC | Age: 68
End: 2023-11-17
Payer: MEDICARE

## 2023-11-17 NOTE — TELEPHONE ENCOUNTER
----- Message from Timur Mendoza sent at 11/17/2023 10:03 AM CST -----  [facility]  Sri is calling in regards to checking the status of prior authorization on pt medication. Please call back at [phone]                        Thanks  KT

## 2023-11-17 NOTE — TELEPHONE ENCOUNTER
Let her know that brinda was called in [facility] for her to take every other day per dr cerrato. 11/1/7/23/sf

## 2023-11-21 ENCOUNTER — TELEPHONE (OUTPATIENT)
Dept: PRIMARY CARE CLINIC | Facility: CLINIC | Age: 68
End: 2023-11-21
Payer: MEDICARE

## 2023-11-21 DIAGNOSIS — M79.675 PAIN OF TOE OF LEFT FOOT: Primary | ICD-10-CM

## 2023-11-21 NOTE — TELEPHONE ENCOUNTER
Pt states that her 3rd toe on left foot still giving her problems. Pt states that it is still hard to put shoes on, hard to walk on it, pain when moving it.     Pt asking if she needs to see podiatry or go for xray    Advised pt Luz would contact her on Wednesday

## 2023-11-21 NOTE — TELEPHONE ENCOUNTER
----- Message from Liana Canales sent at 11/21/2023  2:59 PM CST -----  Pt called wondering if she was being sent for x-rays or to another physician to look at her foot .She stated that her foot was still swollen and hard to walk on. For any further information please contact pt at [phone].

## 2023-11-24 ENCOUNTER — TELEPHONE (OUTPATIENT)
Dept: PRIMARY CARE CLINIC | Facility: CLINIC | Age: 68
End: 2023-11-24
Payer: MEDICARE

## 2023-11-24 ENCOUNTER — TELEPHONE (OUTPATIENT)
Dept: UROLOGY | Facility: CLINIC | Age: 68
End: 2023-11-24
Payer: MEDICARE

## 2023-11-27 ENCOUNTER — OFFICE VISIT (OUTPATIENT)
Dept: HEMATOLOGY/ONCOLOGY | Facility: CLINIC | Age: 68
End: 2023-11-27
Payer: MEDICARE

## 2023-11-27 ENCOUNTER — INFUSION (OUTPATIENT)
Dept: INFUSION THERAPY | Facility: HOSPITAL | Age: 68
End: 2023-11-27
Attending: INTERNAL MEDICINE
Payer: MEDICARE

## 2023-11-27 VITALS
BODY MASS INDEX: 29.3 KG/M2 | WEIGHT: 159.19 LBS | DIASTOLIC BLOOD PRESSURE: 76 MMHG | HEART RATE: 88 BPM | SYSTOLIC BLOOD PRESSURE: 132 MMHG | HEIGHT: 62 IN | OXYGEN SATURATION: 99 % | OXYGEN SATURATION: 96 % | RESPIRATION RATE: 18 BRPM | RESPIRATION RATE: 18 BRPM | WEIGHT: 159.19 LBS | SYSTOLIC BLOOD PRESSURE: 120 MMHG | TEMPERATURE: 97 F | HEIGHT: 62 IN | DIASTOLIC BLOOD PRESSURE: 79 MMHG | TEMPERATURE: 98 F | BODY MASS INDEX: 29.3 KG/M2 | HEART RATE: 92 BPM

## 2023-11-27 DIAGNOSIS — D63.1 ANEMIA ASSOCIATED WITH STAGE 4 CHRONIC RENAL FAILURE: ICD-10-CM

## 2023-11-27 DIAGNOSIS — D84.9 IMMUNOCOMPROMISED PATIENT: ICD-10-CM

## 2023-11-27 DIAGNOSIS — Z94.1 HEART TRANSPLANTED: ICD-10-CM

## 2023-11-27 DIAGNOSIS — N18.4 CKD (CHRONIC KIDNEY DISEASE) STAGE 4, GFR 15-29 ML/MIN: Primary | ICD-10-CM

## 2023-11-27 DIAGNOSIS — N18.4 ANEMIA ASSOCIATED WITH STAGE 4 CHRONIC RENAL FAILURE: ICD-10-CM

## 2023-11-27 DIAGNOSIS — M81.8 OTHER OSTEOPOROSIS WITHOUT CURRENT PATHOLOGICAL FRACTURE: Primary | ICD-10-CM

## 2023-11-27 PROCEDURE — 99214 PR OFFICE/OUTPT VISIT, EST, LEVL IV, 30-39 MIN: ICD-10-PCS | Mod: 25,HCNC,S$GLB, | Performed by: INTERNAL MEDICINE

## 2023-11-27 PROCEDURE — 1126F AMNT PAIN NOTED NONE PRSNT: CPT | Mod: HCNC,CPTII,S$GLB, | Performed by: INTERNAL MEDICINE

## 2023-11-27 PROCEDURE — 1160F PR REVIEW ALL MEDS BY PRESCRIBER/CLIN PHARMACIST DOCUMENTED: ICD-10-PCS | Mod: HCNC,CPTII,S$GLB, | Performed by: INTERNAL MEDICINE

## 2023-11-27 PROCEDURE — 3044F HG A1C LEVEL LT 7.0%: CPT | Mod: HCNC,CPTII,S$GLB, | Performed by: INTERNAL MEDICINE

## 2023-11-27 PROCEDURE — 96372 THER/PROPH/DIAG INJ SC/IM: CPT | Mod: HCNC

## 2023-11-27 PROCEDURE — 3044F PR MOST RECENT HEMOGLOBIN A1C LEVEL <7.0%: ICD-10-PCS | Mod: HCNC,CPTII,S$GLB, | Performed by: INTERNAL MEDICINE

## 2023-11-27 PROCEDURE — 3078F PR MOST RECENT DIASTOLIC BLOOD PRESSURE < 80 MM HG: ICD-10-PCS | Mod: HCNC,CPTII,S$GLB, | Performed by: INTERNAL MEDICINE

## 2023-11-27 PROCEDURE — 99214 OFFICE O/P EST MOD 30 MIN: CPT | Mod: 25,HCNC,S$GLB, | Performed by: INTERNAL MEDICINE

## 2023-11-27 PROCEDURE — 1101F PR PT FALLS ASSESS DOC 0-1 FALLS W/OUT INJ PAST YR: ICD-10-PCS | Mod: HCNC,CPTII,S$GLB, | Performed by: INTERNAL MEDICINE

## 2023-11-27 PROCEDURE — 3008F PR BODY MASS INDEX (BMI) DOCUMENTED: ICD-10-PCS | Mod: HCNC,CPTII,S$GLB, | Performed by: INTERNAL MEDICINE

## 2023-11-27 PROCEDURE — 3066F PR DOCUMENTATION OF TREATMENT FOR NEPHROPATHY: ICD-10-PCS | Mod: HCNC,CPTII,S$GLB, | Performed by: INTERNAL MEDICINE

## 2023-11-27 PROCEDURE — 99999 PR PBB SHADOW E&M-EST. PATIENT-LVL V: CPT | Mod: PBBFAC,HCNC,, | Performed by: INTERNAL MEDICINE

## 2023-11-27 PROCEDURE — 3288F FALL RISK ASSESSMENT DOCD: CPT | Mod: HCNC,CPTII,S$GLB, | Performed by: INTERNAL MEDICINE

## 2023-11-27 PROCEDURE — 3008F BODY MASS INDEX DOCD: CPT | Mod: HCNC,CPTII,S$GLB, | Performed by: INTERNAL MEDICINE

## 2023-11-27 PROCEDURE — 63600175 PHARM REV CODE 636 W HCPCS: Mod: JZ,EC,JG,HCNC

## 2023-11-27 PROCEDURE — 1159F MED LIST DOCD IN RCRD: CPT | Mod: HCNC,CPTII,S$GLB, | Performed by: INTERNAL MEDICINE

## 2023-11-27 PROCEDURE — 1126F PR PAIN SEVERITY QUANTIFIED, NO PAIN PRESENT: ICD-10-PCS | Mod: HCNC,CPTII,S$GLB, | Performed by: INTERNAL MEDICINE

## 2023-11-27 PROCEDURE — 1157F ADVNC CARE PLAN IN RCRD: CPT | Mod: HCNC,CPTII,S$GLB, | Performed by: INTERNAL MEDICINE

## 2023-11-27 PROCEDURE — 3075F PR MOST RECENT SYSTOLIC BLOOD PRESS GE 130-139MM HG: ICD-10-PCS | Mod: HCNC,CPTII,S$GLB, | Performed by: INTERNAL MEDICINE

## 2023-11-27 PROCEDURE — 3288F PR FALLS RISK ASSESSMENT DOCUMENTED: ICD-10-PCS | Mod: HCNC,CPTII,S$GLB, | Performed by: INTERNAL MEDICINE

## 2023-11-27 PROCEDURE — 1159F PR MEDICATION LIST DOCUMENTED IN MEDICAL RECORD: ICD-10-PCS | Mod: HCNC,CPTII,S$GLB, | Performed by: INTERNAL MEDICINE

## 2023-11-27 PROCEDURE — 3066F NEPHROPATHY DOC TX: CPT | Mod: HCNC,CPTII,S$GLB, | Performed by: INTERNAL MEDICINE

## 2023-11-27 PROCEDURE — 1160F RVW MEDS BY RX/DR IN RCRD: CPT | Mod: HCNC,CPTII,S$GLB, | Performed by: INTERNAL MEDICINE

## 2023-11-27 PROCEDURE — 3075F SYST BP GE 130 - 139MM HG: CPT | Mod: HCNC,CPTII,S$GLB, | Performed by: INTERNAL MEDICINE

## 2023-11-27 PROCEDURE — 3078F DIAST BP <80 MM HG: CPT | Mod: HCNC,CPTII,S$GLB, | Performed by: INTERNAL MEDICINE

## 2023-11-27 PROCEDURE — 1157F PR ADVANCE CARE PLAN OR EQUIV PRESENT IN MEDICAL RECORD: ICD-10-PCS | Mod: HCNC,CPTII,S$GLB, | Performed by: INTERNAL MEDICINE

## 2023-11-27 PROCEDURE — 1101F PT FALLS ASSESS-DOCD LE1/YR: CPT | Mod: HCNC,CPTII,S$GLB, | Performed by: INTERNAL MEDICINE

## 2023-11-27 PROCEDURE — 99999 PR PBB SHADOW E&M-EST. PATIENT-LVL V: ICD-10-PCS | Mod: PBBFAC,HCNC,, | Performed by: INTERNAL MEDICINE

## 2023-11-27 RX ADMIN — EPOETIN ALFA-EPBX 20000 UNITS: 10000 INJECTION, SOLUTION INTRAVENOUS; SUBCUTANEOUS at 01:11

## 2023-11-27 NOTE — PROGRESS NOTES
Subjective:       Patient ID: Nadia Damon is a 68 y.o. female.    Chief Complaint: Results, Anemia, and Chronic Renal Failure    HPI:  68-year-old female anemia chronic renal failure.  Patient continues with follow-up patient's hemoglobin is 9.5 2 weeks ago.  In emergency room visit will proceed with erythropoietin dosing today return in 2 weeks with repeat laboratory studies for review    Past Medical History:   Diagnosis Date    Abdominal wall hernia     CT Renal 6/11/2018---Small fat containing superior ventral abdominal wall hernia at the epicardial pacing lead site.    Abnormal mammogram 10/12/2021    GRACE (acute kidney injury) 11/22/2021    Anxiety     Arthritis     ZEN HIPS    Breast cancer in female 08/2021    LEFT BREAST    C. difficile colitis 11/29/2021    Cellulitis of axilla, left 12/23/2021    Chronic diastolic heart failure 12/16/2021    Chronic kidney disease     stage 4, GFR 15-29 ml/min    Chronic midline low back pain without sciatica 06/18/2018    Closed nondisplaced fracture of distal phalanx of left great toe with routine healing 10/22/2018    Coronary artery disease 1993    heart transplant    Cystitis 05/10/2022    Depression     Encounter for blood transfusion     Fibromyalgia     on Lyrica    Heart failure     native heart cardiomyopathy    Heart transplanted 1993    due to cardiomyopathy    History of hyperparathyroidism; Hyperparathyroidism, secondary renal     PT DENIES    Hypertension     Immune disorder     anti rejection meds    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    Impaired mobility 07/28/2022    Iron deficiency anemia 08/15/2017    Kidney stones     passed per pt    Obesity     Other osteoporosis without current pathological fracture 08/30/2019    Parotitis, acute 9/19/2023    Pharyngitis 1/9/2019    Severe sepsis 11/22/2021    Shingles 2003 approx    left leg    Subclinical hypothyroidism 06/16/2023    Thrombocytopenia, unspecified 11/29/2021    Trouble  in sleeping     Urinary incontinence      Family History   Problem Relation Age of Onset    Cancer Mother 38        breast    Breast cancer Mother     Breast cancer Maternal Grandmother     Heart disease Maternal Grandmother     Hypertension Son     Cataracts Cousin     Diabetes Neg Hx     Stroke Neg Hx     Kidney disease Neg Hx     Asthma Neg Hx     COPD Neg Hx     Melanoma Neg Hx     Hyperlipidemia Neg Hx      Social History     Socioeconomic History    Marital status: Single    Number of children: 2    Highest education level: 11th grade   Occupational History    Occupation: Retired   Tobacco Use    Smoking status: Never    Smokeless tobacco: Never   Substance and Sexual Activity    Alcohol use: Never     Alcohol/week: 0.0 standard drinks of alcohol    Drug use: No    Sexual activity: Not Currently     Partners: Male     Birth control/protection: See Surgical Hx   Other Topics Concern    Are you pregnant or think you may be? No    Breast-feeding No   Social History Narrative    Single. 2 children , 1  at 30 yoa  2014 strep throat -  pneumonia and renal complications after not completing course of AB. Other child lives in [place], [place]. Has a cousin locally that could help in an emergency. 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 Caffeine intake =- 1 cola a day. No coffee, + occasional tea, avoids caffeine especially at night. Still drives. She does not have a Living Will or Advanced directive.      Social Determinants of Health     Financial Resource Strain: Low Risk  (2023)    Overall Financial Resource Strain (CARDIA)     Difficulty of Paying Living Expenses: Not hard at all   Food Insecurity: No Food Insecurity (2023)    Hunger Vital Sign     Worried About Running Out of Food in the Last Year: Never true     Ran Out of Food in the Last Year: Never true   Transportation Needs: No Transportation Needs (2023)    PRAPARE - Transportation     Lack of  Transportation (Medical): No     Lack of Transportation (Non-Medical): No   Physical Activity: Sufficiently Active (11/16/2023)    Exercise Vital Sign     Days of Exercise per Week: 3 days     Minutes of Exercise per Session: 60 min   Recent Concern: Physical Activity - Insufficiently Active (9/22/2023)    Exercise Vital Sign     Days of Exercise per Week: 2 days     Minutes of Exercise per Session: 60 min   Stress: No Stress Concern Present (11/16/2023)    [place] [facility] - Occupational Stress Questionnaire     Feeling of Stress : Not at all   Social Connections: Moderately Isolated (11/16/2023)    Social Connection and Isolation Panel [NHANES]     Frequency of Communication with Friends and Family: More than three times a week     Frequency of Social Gatherings with Friends and Family: More than three times a week     Attends Zoroastrian Services: More than 4 times per year     Active Member of Clubs or Organizations: No     Attends Club or Organization Meetings: Never     Marital Status: Never    Housing Stability: Low Risk  (11/16/2023)    Housing Stability Vital Sign     Unable to Pay for Housing in the Last Year: No     Number of Places Lived in the Last Year: 1     Unstable Housing in the Last Year: No     Past Surgical History:   Procedure Laterality Date    BLADDER SURGERY  2015 approx    mesh - Dr Everett then 2nd reconstructive sx Dr Onofre    BREAST BIOPSY Bilateral     NEGATIVE    BREAST BIOPSY Right 10/31/2022    benign    BREAST LUMPECTOMY Left 2021    BREAST SURGERY Left 09/28/2015    Bx - benign    BREAST SURGERY Right 12/2015    Bx benign    CARDIAC PACEMAKER REMOVAL Left 06/26/2014    Pacer defirillator removed. Put in 1993 aat time of heart transplant    CARPAL TUNNEL RELEASE Left 03/03/2015    Dr. Hall    COLONOSCOPY N/A 02/25/2021    Procedure: COLONOSCOPY;  Surgeon: Freida Ramirez MD;  Location: [facility];  Service: Endoscopy;  Laterality: N/A;    CYSTOCELE  REPAIR      Twice with mesh removal    EPIDURAL STEROID INJECTION INTO CERVICAL SPINE N/A 02/02/2023    Procedure: T11/T12 IL HELLEN;  Surgeon: Jassi Pierre MD;  Location: [facility];  Service: Pain Management;  Laterality: N/A;    HEART TRANSPLANT  1993    HERNIA REPAIR Right 1971 approx    Inguinal    HYSTERECTOMY  1983    vag hyst /LSO     INCISION AND DRAINAGE OF ABSCESS Left 12/24/2021    Procedure: INCISION AND DRAINAGE, ABSCESS;  Surgeon: Joseph Longo MD;  Location: [facility] OR;  Service: General;  Laterality: Left;    INJECTION OF ANESTHETIC AGENT AROUND MEDIAL BRANCH NERVES INNERVATING LUMBAR FACET JOINT Right 10/19/2022    Procedure: Right L4/L5 and L5/S1 MBB;  Surgeon: Jassi Pierre MD;  Location: [facility];  Service: Pain Management;  Laterality: Right;    INJECTION OF ANESTHETIC AGENT AROUND MEDIAL BRANCH NERVES INNERVATING LUMBAR FACET JOINT Right 11/09/2022    Procedure: Right L4/L5 and L5/S1 MBB;  Surgeon: Jassi Pierre MD;  Location: [facility];  Service: Pain Management;  Laterality: Right;    INJECTION OF ANESTHETIC AGENT INTO SACROILIAC JOINT Right 08/22/2022    Procedure: Right SIJ Injection Right L5/S1 Facte Injection;  Surgeon: Jassi Pierre MD;  Location: [facility];  Service: Pain Management;  Laterality: Right;    INSERTION OF TUNNELED CENTRAL VENOUS CATHETER (CVC) WITH SUBCUTANEOUS PORT N/A 11/09/2021    Procedure: [id number];  Surgeon: Christoph Douglas MD;  Location: [facility] OR;  Service: General;  Laterality: N/A;    RADIOFREQUENCY THERMOCOAGULATION Right 12/07/2022    Procedure: Right L4/L5 and L5/S1 Lumbar RFA;  Surgeon: Jassi Pierre MD;  Location: [facility];  Service: Pain Management;  Laterality: Right;    REMOVAL OF VASCULAR ACCESS PORT      ROBOT-ASSISTED LAPAROSCOPIC ABDOMINAL SACROCOLPOPEXY N/A 8/10/2023    Procedure: ROBOTIC SACROCOLPOPEXY, ABDOMEN;  Surgeon: PRANAY Villalobos MD;  Location: [facility] OR;  Service: OB/GYN;  Laterality:  N/A;    ROBOT-ASSISTED LAPAROSCOPIC OOPHORECTOMY Right 8/10/2023    Procedure: ROBOTIC OOPHORECTOMY;  Surgeon: PRANAY Villalobos MD;  Location: [facility] OR;  Service: OB/GYN;  Laterality: Right;    SENTINEL LYMPH NODE BIOPSY Left 10/12/2021    Procedure: BIOPSY, LYMPH NODE, SENTINEL;  Surgeon: Christoph Douglas MD;  Location: [facility] OR;  Service: General;  Laterality: Left;    TOE SURGERY      XI ROBOTIC URETHROPEXY N/A 8/10/2023    Procedure: XI ROBOTIC URETHROPEXY;  Surgeon: PRANAY Villalobos MD;  Location: [facility] OR;  Service: OB/GYN;  Laterality: N/A;       Labs:  Lab Results   Component Value Date    WBC 2.50 (L) 11/15/2023    HGB 9.5 (L) 11/15/2023    HCT 30.0 (L) 11/15/2023    MCV 89 11/15/2023     11/15/2023     BMP  Lab Results   Component Value Date     11/15/2023    K 5.2 (H) 11/15/2023     (H) 11/15/2023    CO2 16 (L) 11/15/2023    BUN 43 (H) 11/15/2023    CREATININE 3.3 (H) 11/15/2023    CALCIUM 8.8 11/15/2023    ANIONGAP 12 11/15/2023    ESTGFRAFRICA 19 (A) 07/14/2022    EGFRNONAA 17 (A) 07/14/2022     Lab Results   Component Value Date    ALT 15 11/15/2023    AST 29 11/15/2023    ALKPHOS 103 11/15/2023    BILITOT 0.4 11/15/2023       Lab Results   Component Value Date    IRON 135 11/14/2023    TIBC 327 11/14/2023    FERRITIN 97 11/14/2023     Lab Results   Component Value Date    BVFIHILG19 1373 (H) 06/20/2023     Lab Results   Component Value Date    FOLATE 20.0 06/20/2023     Lab Results   Component Value Date    TSH 7.542 (H) 11/14/2023         Review of Systems   Constitutional:  Positive for fatigue. Negative for activity change, appetite change, chills, diaphoresis, fever and unexpected weight change.   HENT:  Negative for congestion, dental problem, drooling, ear discharge, ear pain, facial swelling, hearing loss, mouth sores, nosebleeds, postnasal drip, rhinorrhea, sinus pressure, sneezing, sore throat, tinnitus, trouble swallowing and voice change.    Eyes:  Negative for  photophobia, pain, discharge, redness, itching and visual disturbance.   Respiratory:  Negative for cough, choking, chest tightness, shortness of breath, wheezing and stridor.    Cardiovascular:  Negative for chest pain, palpitations and leg swelling.   Gastrointestinal:  Negative for abdominal distention, abdominal pain, anal bleeding, blood in stool, constipation, diarrhea, nausea, rectal pain and vomiting.   Endocrine: Negative for cold intolerance, heat intolerance, polydipsia, polyphagia and polyuria.   Genitourinary:  Negative for decreased urine volume, difficulty urinating, dyspareunia, dysuria, enuresis, flank pain, frequency, genital sores, hematuria, menstrual problem, pelvic pain, urgency, vaginal bleeding, vaginal discharge and vaginal pain.   Musculoskeletal:  Negative for arthralgias, back pain, gait problem, joint swelling, myalgias, neck pain and neck stiffness.   Skin:  Negative for color change, pallor and rash.   Allergic/Immunologic: Negative for environmental allergies, food allergies and immunocompromised state.   Neurological:  Negative for dizziness, tremors, seizures, syncope, facial asymmetry, speech difficulty, weakness, light-headedness, numbness and headaches.   Hematological:  Negative for adenopathy. Does not bruise/bleed easily.   Psychiatric/Behavioral:  Negative for agitation, behavioral problems, confusion, decreased concentration, dysphoric mood, hallucinations, self-injury, sleep disturbance and suicidal ideas. The patient is not nervous/anxious and is not hyperactive.        Objective:      Physical Exam  Vitals reviewed.   Constitutional:       General: She is not in acute distress.     Appearance: She is well-developed. She is not diaphoretic.   HENT:      Head: Normocephalic and atraumatic.      Right Ear: External ear normal.      Left Ear: External ear normal.      Nose: Nose normal.      Right Sinus: No maxillary sinus tenderness or frontal sinus tenderness.      Left  Sinus: No maxillary sinus tenderness or frontal sinus tenderness.      Mouth/Throat:      Pharynx: No oropharyngeal exudate.   Eyes:      General: Lids are normal. No scleral icterus.        Right eye: No discharge.         Left eye: No discharge.      Conjunctiva/sclera: Conjunctivae normal.      Right eye: Right conjunctiva is not injected. No hemorrhage.     Left eye: Left conjunctiva is not injected. No hemorrhage.     Pupils: Pupils are equal, round, and reactive to light.   Neck:      Thyroid: No thyromegaly.      Vascular: No JVD.      Trachea: No tracheal deviation.   Cardiovascular:      Rate and Rhythm: Normal rate.   Pulmonary:      Effort: Pulmonary effort is normal. No respiratory distress.      Breath sounds: No stridor.   Chest:      Chest wall: No tenderness.   Abdominal:      General: Bowel sounds are normal. There is no distension.      Palpations: Abdomen is soft. There is no hepatomegaly, splenomegaly or mass.      Tenderness: There is no abdominal tenderness. There is no rebound.   Musculoskeletal:         General: No tenderness. Normal range of motion.      Cervical back: Normal range of motion and neck supple.   Lymphadenopathy:      Cervical: No cervical adenopathy.      Upper Body:      Right upper body: No supraclavicular adenopathy.      Left upper body: No supraclavicular adenopathy.   Skin:     General: Skin is dry.      Findings: No erythema or rash.   Neurological:      Mental Status: She is alert and oriented to person, place, and time.      Cranial Nerves: No cranial nerve deficit.      Coordination: Coordination normal.   Psychiatric:         Behavior: Behavior normal.         Thought Content: Thought content normal.         Judgment: Judgment normal.             Assessment:      1. CKD (chronic kidney disease) stage 4, GFR 15-29 ml/min    2. Heart transplanted    3. Immunocompromised patient    4. Anemia associated with stage 4 chronic renal failure           Med Onc Chart  Routing      Follow up with physician    Follow up with LIA . Return to clinic in 2 weeks to be seen by [facility] with standing labs prior continue with erythropoietin dosing trying to keep 2010 and 11 g   Infusion scheduling note    Injection scheduling note    Labs    Imaging    Pharmacy appointment    Other referrals                   Plan:     Proceed with EPO dosing today patient is to return in 2 weeks with CBC CMP iron studies prior can be seen by APAP follow-up trying to keep hemoglobin between 10 and 11 g.  Continue follow-up with ductal carcinoma in Situ with metastatic disease in 1 positive lymph node with breast surgery.  Discussed above        Juan Collins Jr, MD FACP

## 2023-11-28 ENCOUNTER — HOSPITAL ENCOUNTER (OUTPATIENT)
Dept: RADIOLOGY | Facility: HOSPITAL | Age: 68
Discharge: HOME | End: 2023-11-28
Attending: PODIATRIST
Payer: MEDICARE

## 2023-11-28 ENCOUNTER — OFFICE VISIT (OUTPATIENT)
Dept: PODIATRY | Facility: CLINIC | Age: 68
End: 2023-11-28
Payer: MEDICARE

## 2023-11-28 VITALS — WEIGHT: 159 LBS | BODY MASS INDEX: 29.26 KG/M2 | HEIGHT: 62 IN

## 2023-11-28 DIAGNOSIS — S90.122A CONTUSION OF LESSER TOE OF LEFT FOOT WITHOUT DAMAGE TO NAIL, INITIAL ENCOUNTER: ICD-10-CM

## 2023-11-28 DIAGNOSIS — M79.675 PAIN OF TOE OF LEFT FOOT: Primary | ICD-10-CM

## 2023-11-28 DIAGNOSIS — Z94.1 HEART TRANSPLANTED: Chronic | ICD-10-CM

## 2023-11-28 DIAGNOSIS — M79.675 PAIN OF TOE OF LEFT FOOT: ICD-10-CM

## 2023-11-28 DIAGNOSIS — N18.4 CKD (CHRONIC KIDNEY DISEASE) STAGE 4, GFR 15-29 ML/MIN: Chronic | ICD-10-CM

## 2023-11-28 PROCEDURE — 1101F PT FALLS ASSESS-DOCD LE1/YR: CPT | Mod: HCNC,CPTII,S$GLB, | Performed by: PODIATRIST

## 2023-11-28 PROCEDURE — 3288F FALL RISK ASSESSMENT DOCD: CPT | Mod: HCNC,CPTII,S$GLB, | Performed by: PODIATRIST

## 2023-11-28 PROCEDURE — 1125F AMNT PAIN NOTED PAIN PRSNT: CPT | Mod: HCNC,CPTII,S$GLB, | Performed by: PODIATRIST

## 2023-11-28 PROCEDURE — 73630 XR FOOT COMPLETE 3 VIEW LEFT: ICD-10-PCS | Mod: 26,HCNC,LT, | Performed by: RADIOLOGY

## 2023-11-28 PROCEDURE — 3008F BODY MASS INDEX DOCD: CPT | Mod: HCNC,CPTII,S$GLB, | Performed by: PODIATRIST

## 2023-11-28 PROCEDURE — 99204 OFFICE O/P NEW MOD 45 MIN: CPT | Mod: HCNC,S$GLB,, | Performed by: PODIATRIST

## 2023-11-28 PROCEDURE — 1160F RVW MEDS BY RX/DR IN RCRD: CPT | Mod: HCNC,CPTII,S$GLB, | Performed by: PODIATRIST

## 2023-11-28 PROCEDURE — 99999 PR PBB SHADOW E&M-EST. PATIENT-LVL IV: CPT | Mod: PBBFAC,HCNC,, | Performed by: PODIATRIST

## 2023-11-28 PROCEDURE — 3044F PR MOST RECENT HEMOGLOBIN A1C LEVEL <7.0%: ICD-10-PCS | Mod: HCNC,CPTII,S$GLB, | Performed by: PODIATRIST

## 2023-11-28 PROCEDURE — 3066F NEPHROPATHY DOC TX: CPT | Mod: HCNC,CPTII,S$GLB, | Performed by: PODIATRIST

## 2023-11-28 PROCEDURE — 3288F PR FALLS RISK ASSESSMENT DOCUMENTED: ICD-10-PCS | Mod: HCNC,CPTII,S$GLB, | Performed by: PODIATRIST

## 2023-11-28 PROCEDURE — 1157F ADVNC CARE PLAN IN RCRD: CPT | Mod: HCNC,CPTII,S$GLB, | Performed by: PODIATRIST

## 2023-11-28 PROCEDURE — 1157F PR ADVANCE CARE PLAN OR EQUIV PRESENT IN MEDICAL RECORD: ICD-10-PCS | Mod: HCNC,CPTII,S$GLB, | Performed by: PODIATRIST

## 2023-11-28 PROCEDURE — 99204 PR OFFICE/OUTPT VISIT, NEW, LEVL IV, 45-59 MIN: ICD-10-PCS | Mod: HCNC,S$GLB,, | Performed by: PODIATRIST

## 2023-11-28 PROCEDURE — 1101F PR PT FALLS ASSESS DOC 0-1 FALLS W/OUT INJ PAST YR: ICD-10-PCS | Mod: HCNC,CPTII,S$GLB, | Performed by: PODIATRIST

## 2023-11-28 PROCEDURE — 1159F MED LIST DOCD IN RCRD: CPT | Mod: HCNC,CPTII,S$GLB, | Performed by: PODIATRIST

## 2023-11-28 PROCEDURE — 3008F PR BODY MASS INDEX (BMI) DOCUMENTED: ICD-10-PCS | Mod: HCNC,CPTII,S$GLB, | Performed by: PODIATRIST

## 2023-11-28 PROCEDURE — 73630 X-RAY EXAM OF FOOT: CPT | Mod: TC,HCNC,LT

## 2023-11-28 PROCEDURE — 1160F PR REVIEW ALL MEDS BY PRESCRIBER/CLIN PHARMACIST DOCUMENTED: ICD-10-PCS | Mod: HCNC,CPTII,S$GLB, | Performed by: PODIATRIST

## 2023-11-28 PROCEDURE — 3066F PR DOCUMENTATION OF TREATMENT FOR NEPHROPATHY: ICD-10-PCS | Mod: HCNC,CPTII,S$GLB, | Performed by: PODIATRIST

## 2023-11-28 PROCEDURE — 1159F PR MEDICATION LIST DOCUMENTED IN MEDICAL RECORD: ICD-10-PCS | Mod: HCNC,CPTII,S$GLB, | Performed by: PODIATRIST

## 2023-11-28 PROCEDURE — 99999 PR PBB SHADOW E&M-EST. PATIENT-LVL IV: ICD-10-PCS | Mod: PBBFAC,HCNC,, | Performed by: PODIATRIST

## 2023-11-28 PROCEDURE — 3044F HG A1C LEVEL LT 7.0%: CPT | Mod: HCNC,CPTII,S$GLB, | Performed by: PODIATRIST

## 2023-11-28 PROCEDURE — 73630 X-RAY EXAM OF FOOT: CPT | Mod: 26,HCNC,LT, | Performed by: RADIOLOGY

## 2023-11-28 PROCEDURE — 1125F PR PAIN SEVERITY QUANTIFIED, PAIN PRESENT: ICD-10-PCS | Mod: HCNC,CPTII,S$GLB, | Performed by: PODIATRIST

## 2023-11-28 RX ORDER — PREDNISONE 20 MG/1
20 TABLET ORAL DAILY
Qty: 5 TABLET | Refills: 0 | Status: SHIPPED | OUTPATIENT
Start: 2023-11-28 | End: 2023-11-28

## 2023-11-28 RX ORDER — PREDNISONE 20 MG/1
20 TABLET ORAL DAILY
Qty: 5 TABLET | Refills: 0 | Status: SHIPPED | OUTPATIENT
Start: 2023-11-28 | End: 2023-12-03

## 2023-11-28 NOTE — PROGRESS NOTES
Subjective:       Patient ID: Nadia Damon is a 68 y.o. female.    Chief Complaint: Toe Pain (C/o toe pain, pt was last seen by PCP on 8/7/23, nondiabetic, rate pain 8/10, pt is wearing boots)      HPI: Nadia Damon presents to the clinic today for evaluation concerning stated moderate pains to the left foot/ankle at the 3rd toe. Patient states pains are approx. 8/10. Pains are described as achy and sharp. Pains have been present for duration of several weeks. Patient states the pains are exacerbated with walking and standing and prolonged activities. States no prior medical evaluation by a MD/DO/DPM/NP. States Tylenol and not NSAIDs. Trauma is not stated. Patient's Primary Care Provider is Elis Wick MD. Hx of 1st MTPJ on the LLE 3-4 years ago with Dr. Mulligan. States dorsal plate pain at times.      Review of patient's allergies indicates:   Allergen Reactions    Lisinopril Swelling and Rash    Augmentin [amoxicillin-pot clavulanate] Diarrhea    Zyvox [linezolid] Nausea And Vomiting       Past Medical History:   Diagnosis Date    Abdominal wall hernia     CT Renal 6/11/2018---Small fat containing superior ventral abdominal wall hernia at the epicardial pacing lead site.    Abnormal mammogram 10/12/2021    GRACE (acute kidney injury) 11/22/2021    Anxiety     Arthritis     ZEN HIPS    Breast cancer in female 08/2021    LEFT BREAST    C. difficile colitis 11/29/2021    Cellulitis of axilla, left 12/23/2021    Chronic diastolic heart failure 12/16/2021    Chronic kidney disease     stage 4, GFR 15-29 ml/min    Chronic midline low back pain without sciatica 06/18/2018    Closed nondisplaced fracture of distal phalanx of left great toe with routine healing 10/22/2018    Coronary artery disease 1993    heart transplant    Cystitis 05/10/2022    Depression     Encounter for blood transfusion     Fibromyalgia     on Lyrica    Heart failure     native heart cardiomyopathy    Heart transplanted 1993    due to  cardiomyopathy    History of hyperparathyroidism; Hyperparathyroidism, secondary renal     PT DENIES    Hypertension     Immune disorder     anti rejection meds    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    Impaired mobility 2022    Iron deficiency anemia 08/15/2017    Kidney stones     passed per pt    Obesity     Other osteoporosis without current pathological fracture 2019    Parotitis, acute 2023    Pharyngitis 2019    Severe sepsis 2021    Shingles 2003 approx    left leg    Subclinical hypothyroidism 2023    Thrombocytopenia, unspecified 2021    Trouble in sleeping     Urinary incontinence        Family History   Problem Relation Age of Onset    Cancer Mother 38        breast    Breast cancer Mother     Breast cancer Maternal Grandmother     Heart disease Maternal Grandmother     Hypertension Son     Cataracts Cousin     Diabetes Neg Hx     Stroke Neg Hx     Kidney disease Neg Hx     Asthma Neg Hx     COPD Neg Hx     Melanoma Neg Hx     Hyperlipidemia Neg Hx        Social History     Socioeconomic History    Marital status: Single    Number of children: 2    Highest education level: 11th grade   Occupational History    Occupation: Retired   Tobacco Use    Smoking status: Never    Smokeless tobacco: Never   Substance and Sexual Activity    Alcohol use: Never     Alcohol/week: 0.0 standard drinks of alcohol    Drug use: No    Sexual activity: Not Currently     Partners: Male     Birth control/protection: See Surgical Hx   Other Topics Concern    Are you pregnant or think you may be? No    Breast-feeding No   Social History Narrative    Single. 2 children , 1  at 30 yoa  2014 strep throat -  pneumonia and renal complications after not completing course of AB. Other child lives in [place], [place]. Has a cousin locally that could help in an emergency. 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 Caffeine intake =- 1 cola a day.  No coffee, + occasional tea, avoids caffeine especially at night. Still drives. She does not have a Living Will or Advanced directive.      Social Determinants of Health     Financial Resource Strain: Low Risk  (11/16/2023)    Overall Financial Resource Strain (CARDIA)     Difficulty of Paying Living Expenses: Not hard at all   Food Insecurity: No Food Insecurity (11/16/2023)    Hunger Vital Sign     Worried About Running Out of Food in the Last Year: Never true     Ran Out of Food in the Last Year: Never true   Transportation Needs: No Transportation Needs (11/16/2023)    PRAPARE - Transportation     Lack of Transportation (Medical): No     Lack of Transportation (Non-Medical): No   Physical Activity: Sufficiently Active (11/16/2023)    Exercise Vital Sign     Days of Exercise per Week: 3 days     Minutes of Exercise per Session: 60 min   Recent Concern: Physical Activity - Insufficiently Active (9/22/2023)    Exercise Vital Sign     Days of Exercise per Week: 2 days     Minutes of Exercise per Session: 60 min   Stress: No Stress Concern Present (11/16/2023)    [employer] [employer] - Occupational Stress Questionnaire     Feeling of Stress : Not at all   Social Connections: Moderately Isolated (11/16/2023)    Social Connection and Isolation Panel [NHANES]     Frequency of Communication with Friends and Family: More than three times a week     Frequency of Social Gatherings with Friends and Family: More than three times a week     Attends Yarsanism Services: More than 4 times per year     Active Member of Clubs or Organizations: No     Attends Club or Organization Meetings: Never     Marital Status: Never    Housing Stability: Low Risk  (11/16/2023)    Housing Stability Vital Sign     Unable to Pay for Housing in the Last Year: No     Number of Places Lived in the Last Year: 1     Unstable Housing in the Last Year: No       Past Surgical History:   Procedure Laterality Date    BLADDER  SURGERY  2015 approx    mesh - Dr Everett then 2nd reconstructive sx Dr Onofre    BREAST BIOPSY Bilateral     NEGATIVE    BREAST BIOPSY Right 10/31/2022    benign    BREAST LUMPECTOMY Left 2021    BREAST SURGERY Left 09/28/2015    Bx - benign    BREAST SURGERY Right 12/2015    Bx benign    CARDIAC PACEMAKER REMOVAL Left 06/26/2014    Pacer defirillator removed. Put in 1993 aat time of heart transplant    CARPAL TUNNEL RELEASE Left 03/03/2015    Dr. Hall    COLONOSCOPY N/A 02/25/2021    Procedure: COLONOSCOPY;  Surgeon: Freida Ramirez MD;  Location: [facility] ENDO;  Service: Endoscopy;  Laterality: N/A;    CYSTOCELE REPAIR      Twice with mesh removal    EPIDURAL STEROID INJECTION INTO CERVICAL SPINE N/A 02/02/2023    Procedure: T11/T12 IL HELLEN;  Surgeon: Jassi Pierre MD;  Location: [facility] PAIN MGT;  Service: Pain Management;  Laterality: N/A;    HEART TRANSPLANT  1993    HERNIA REPAIR Right 1971 approx    Inguinal    HYSTERECTOMY  1983    vag hyst /LSO     INCISION AND DRAINAGE OF ABSCESS Left 12/24/2021    Procedure: INCISION AND DRAINAGE, ABSCESS;  Surgeon: Joseph Longo MD;  Location: [facility] OR;  Service: General;  Laterality: Left;    INJECTION OF ANESTHETIC AGENT AROUND MEDIAL BRANCH NERVES INNERVATING LUMBAR FACET JOINT Right 10/19/2022    Procedure: Right L4/L5 and L5/S1 MBB;  Surgeon: Jassi Pierre MD;  Location: [facility] PAIN MGT;  Service: Pain Management;  Laterality: Right;    INJECTION OF ANESTHETIC AGENT AROUND MEDIAL BRANCH NERVES INNERVATING LUMBAR FACET JOINT Right 11/09/2022    Procedure: Right L4/L5 and L5/S1 MBB;  Surgeon: Jassi Pierre MD;  Location: [facility] PAIN MGT;  Service: Pain Management;  Laterality: Right;    INJECTION OF ANESTHETIC AGENT INTO SACROILIAC JOINT Right 08/22/2022    Procedure: Right SIJ Injection Right L5/S1 Facte Injection;  Surgeon: Jassi Pierre MD;  Location: [facility] PAIN MGT;  Service: Pain Management;  Laterality: Right;    INSERTION OF TUNNELED CENTRAL VENOUS  "CATHETER (CVC) WITH SUBCUTANEOUS PORT N/A 11/09/2021    Procedure: [id number];  Surgeon: Christoph Douglas MD;  Location: [facility] OR;  Service: General;  Laterality: N/A;    RADIOFREQUENCY THERMOCOAGULATION Right 12/07/2022    Procedure: Right L4/L5 and L5/S1 Lumbar RFA;  Surgeon: Jassi Pierre MD;  Location: [facility] PAIN MGT;  Service: Pain Management;  Laterality: Right;    REMOVAL OF VASCULAR ACCESS PORT      ROBOT-ASSISTED LAPAROSCOPIC ABDOMINAL SACROCOLPOPEXY N/A 8/10/2023    Procedure: ROBOTIC SACROCOLPOPEXY, ABDOMEN;  Surgeon: PRANAY Villalobos MD;  Location: [facility] OR;  Service: OB/GYN;  Laterality: N/A;    ROBOT-ASSISTED LAPAROSCOPIC OOPHORECTOMY Right 8/10/2023    Procedure: ROBOTIC OOPHORECTOMY;  Surgeon: PRANAY Villalobos MD;  Location: [facility] OR;  Service: OB/GYN;  Laterality: Right;    SENTINEL LYMPH NODE BIOPSY Left 10/12/2021    Procedure: BIOPSY, LYMPH NODE, SENTINEL;  Surgeon: Christoph Douglas MD;  Location: [facility] OR;  Service: General;  Laterality: Left;    TOE SURGERY      XI ROBOTIC URETHROPEXY N/A 8/10/2023    Procedure: XI ROBOTIC URETHROPEXY;  Surgeon: PRANAY Villalobos MD;  Location: [facility] OR;  Service: OB/GYN;  Laterality: N/A;       Review of Systems   Constitutional:  Negative for chills, fatigue and fever.   HENT:  Negative for hearing loss.    Eyes:  Negative for photophobia and visual disturbance.   Respiratory:  Negative for cough, chest tightness, shortness of breath and wheezing.    Cardiovascular:  Negative for chest pain and palpitations.   Gastrointestinal:  Negative for constipation, diarrhea, nausea and vomiting.   Endocrine: Negative for cold intolerance and heat intolerance.   Genitourinary:  Negative for flank pain.   Musculoskeletal:  Positive for gait problem. Negative for neck pain and neck stiffness.   Neurological:  Negative for light-headedness and headaches.   Psychiatric/Behavioral:  Negative for sleep disturbance.          Objective:   Ht 5' 2" (1.575 m)   Wt " 72.1 kg (159 lb)   LMP 06/20/1983 (Within Years)   BMI 29.08 kg/m²       Physical Exam    LOWER EXTREMITY PHYSICAL EXAMINATION  DERMATOLOGY: Skin is supple, dry and intact.     ORTHOPEDIC: Manual Muscle Testing is 5/5 in all planes on the LLE, without pains, with and without resistance. Moderate to severe pains with mild edema at the LLE 3rd toe and associated webspaces. Antalgic gait. 1st MTPJ fusion.    NEUROLOGY: Upon palpation of the interspaces, there are no neurological sensations stated that radiate proximal or distal. Upon compression of the metatarsal heads from medial to lateral, no neurological sensations or symptoms are stated. Deep tendon reflexes to the lower extremity are WNL.    Assessment:     1. Pain of toe of left foot    2. Contusion of lesser toe of left foot without damage to nail, initial encounter    3. Heart transplanted    4. CKD (chronic kidney disease) stage 4, GFR 15-29 ml/min          Plan:     Pain of toe of left foot  -     Ambulatory referral/consult to Podiatry  -     X-Ray Foot Complete Left; Future; Expected date: 11/28/2023  -     Discontinue: predniSONE (DELTASONE) 20 MG tablet; Take 1 tablet (20 mg total) by mouth once daily. for 5 days  Dispense: 5 tablet; Refill: 0  -     predniSONE (DELTASONE) 20 MG tablet; Take 1 tablet (20 mg total) by mouth once daily. for 5 days  Dispense: 5 tablet; Refill: 0    Contusion of lesser toe of left foot without damage to nail, initial encounter  -     Discontinue: predniSONE (DELTASONE) 20 MG tablet; Take 1 tablet (20 mg total) by mouth once daily. for 5 days  Dispense: 5 tablet; Refill: 0  -     predniSONE (DELTASONE) 20 MG tablet; Take 1 tablet (20 mg total) by mouth once daily. for 5 days  Dispense: 5 tablet; Refill: 0    Heart transplanted    CKD (chronic kidney disease) stage 4, GFR 15-29 ml/min      Thorough discussion is had with the patient today, concerning the diagnosis, its etiology, and the treatment algorithm at present.      XRAYS are reviewed in detail with the patient. All questions and concerns regarding findings and its/their implications are outlined and discussed.    XRAY does not suggest stress fracture or other pathology of the 3rd toe or MTPJ.    Clinical examination does not suggest intermetatarsal bursitis or neuroma.    For now, PO steroid for 5 days.    Start CAM Walker for 10-14 days, then D/C.    If persistent symptoms, may need MRI.            Future Appointments   Date Time Provider Department Center   11/28/2023  1:00 [facility]   11/29/2023  9:20 AM Sri Gallagher NP [facility] UROLOGY [facility]   11/30/2023  4:40 PM LABORATORY, CAYDEN BURRELL [facility] LAB [facility]   12/4/2023  2:30 PM Carter Crawford MD [facility] NEPHRO [facility]   12/11/2023 12:00 PM [facility] SAME DAY LAB [facility] LAB DS [facility]   12/11/2023  1:00 PM Kelsie Burk PA-C [facility] HEM ONC [facility]   12/11/2023  2:00 PM INJECTION 1, [facility] INFUSION [facility] INFSN [facility]   12/29/2023  3:40 PM Elis Wick MD [facility]   1/2/2024  3:00 PM Elis Wick MD [facility]   7/3/2024  1:10 PM LABORATORY, CAYDEN BURRELL ON LAB [facility]   7/3/2024  1:30 PM ON BMD1 [facility] DEXABMD [facility]   7/11/2024 12:30 PM Prasanth Johnson MD [facility] RHEU [facility]

## 2023-11-29 ENCOUNTER — OFFICE VISIT (OUTPATIENT)
Dept: UROLOGY | Facility: CLINIC | Age: 68
End: 2023-11-29
Payer: MEDICARE

## 2023-11-29 VITALS
HEART RATE: 97 BPM | DIASTOLIC BLOOD PRESSURE: 95 MMHG | RESPIRATION RATE: 16 BRPM | HEIGHT: 62 IN | BODY MASS INDEX: 29.25 KG/M2 | WEIGHT: 158.94 LBS | SYSTOLIC BLOOD PRESSURE: 155 MMHG

## 2023-11-29 DIAGNOSIS — N39.41 URGE INCONTINENCE OF URINE: Primary | ICD-10-CM

## 2023-11-29 LAB
BILIRUB UR QL STRIP: NEGATIVE
GLUCOSE UR QL STRIP: NEGATIVE
KETONES UR QL STRIP: NEGATIVE
LEUKOCYTE ESTERASE UR QL STRIP: POSITIVE
PH, POC UA: 6
POC BLOOD, URINE: NEGATIVE
POC NITRATES, URINE: NEGATIVE
PROT UR QL STRIP: POSITIVE
SP GR UR STRIP: 1.02 (ref 1–1.03)
UROBILINOGEN UR STRIP-ACNC: 0.2 (ref 0.1–1.1)

## 2023-11-29 PROCEDURE — 3080F PR MOST RECENT DIASTOLIC BLOOD PRESSURE >= 90 MM HG: ICD-10-PCS | Mod: HCNC,CPTII,S$GLB, | Performed by: NURSE PRACTITIONER

## 2023-11-29 PROCEDURE — 3008F BODY MASS INDEX DOCD: CPT | Mod: HCNC,CPTII,S$GLB, | Performed by: NURSE PRACTITIONER

## 2023-11-29 PROCEDURE — 3008F PR BODY MASS INDEX (BMI) DOCUMENTED: ICD-10-PCS | Mod: HCNC,CPTII,S$GLB, | Performed by: NURSE PRACTITIONER

## 2023-11-29 PROCEDURE — 81003 URINALYSIS AUTO W/O SCOPE: CPT | Mod: QW,HCNC,S$GLB, | Performed by: NURSE PRACTITIONER

## 2023-11-29 PROCEDURE — 81003 POCT URINALYSIS, DIPSTICK, AUTOMATED, W/O SCOPE: ICD-10-PCS | Mod: QW,HCNC,S$GLB, | Performed by: NURSE PRACTITIONER

## 2023-11-29 PROCEDURE — 1157F ADVNC CARE PLAN IN RCRD: CPT | Mod: HCNC,CPTII,S$GLB, | Performed by: NURSE PRACTITIONER

## 2023-11-29 PROCEDURE — 1159F MED LIST DOCD IN RCRD: CPT | Mod: HCNC,CPTII,S$GLB, | Performed by: NURSE PRACTITIONER

## 2023-11-29 PROCEDURE — 3066F PR DOCUMENTATION OF TREATMENT FOR NEPHROPATHY: ICD-10-PCS | Mod: HCNC,CPTII,S$GLB, | Performed by: NURSE PRACTITIONER

## 2023-11-29 PROCEDURE — 99214 OFFICE O/P EST MOD 30 MIN: CPT | Mod: HCNC,S$GLB,, | Performed by: NURSE PRACTITIONER

## 2023-11-29 PROCEDURE — 1159F PR MEDICATION LIST DOCUMENTED IN MEDICAL RECORD: ICD-10-PCS | Mod: HCNC,CPTII,S$GLB, | Performed by: NURSE PRACTITIONER

## 2023-11-29 PROCEDURE — 3288F FALL RISK ASSESSMENT DOCD: CPT | Mod: HCNC,CPTII,S$GLB, | Performed by: NURSE PRACTITIONER

## 2023-11-29 PROCEDURE — 3044F HG A1C LEVEL LT 7.0%: CPT | Mod: HCNC,CPTII,S$GLB, | Performed by: NURSE PRACTITIONER

## 2023-11-29 PROCEDURE — 1101F PR PT FALLS ASSESS DOC 0-1 FALLS W/OUT INJ PAST YR: ICD-10-PCS | Mod: HCNC,CPTII,S$GLB, | Performed by: NURSE PRACTITIONER

## 2023-11-29 PROCEDURE — 99999 PR PBB SHADOW E&M-EST. PATIENT-LVL V: CPT | Mod: PBBFAC,HCNC,, | Performed by: NURSE PRACTITIONER

## 2023-11-29 PROCEDURE — 3066F NEPHROPATHY DOC TX: CPT | Mod: HCNC,CPTII,S$GLB, | Performed by: NURSE PRACTITIONER

## 2023-11-29 PROCEDURE — 99999 PR PBB SHADOW E&M-EST. PATIENT-LVL V: ICD-10-PCS | Mod: PBBFAC,HCNC,, | Performed by: NURSE PRACTITIONER

## 2023-11-29 PROCEDURE — 99214 PR OFFICE/OUTPT VISIT, EST, LEVL IV, 30-39 MIN: ICD-10-PCS | Mod: HCNC,S$GLB,, | Performed by: NURSE PRACTITIONER

## 2023-11-29 PROCEDURE — 3077F SYST BP >= 140 MM HG: CPT | Mod: HCNC,CPTII,S$GLB, | Performed by: NURSE PRACTITIONER

## 2023-11-29 PROCEDURE — 3288F PR FALLS RISK ASSESSMENT DOCUMENTED: ICD-10-PCS | Mod: HCNC,CPTII,S$GLB, | Performed by: NURSE PRACTITIONER

## 2023-11-29 PROCEDURE — 1101F PT FALLS ASSESS-DOCD LE1/YR: CPT | Mod: HCNC,CPTII,S$GLB, | Performed by: NURSE PRACTITIONER

## 2023-11-29 PROCEDURE — 3080F DIAST BP >= 90 MM HG: CPT | Mod: HCNC,CPTII,S$GLB, | Performed by: NURSE PRACTITIONER

## 2023-11-29 PROCEDURE — 3044F PR MOST RECENT HEMOGLOBIN A1C LEVEL <7.0%: ICD-10-PCS | Mod: HCNC,CPTII,S$GLB, | Performed by: NURSE PRACTITIONER

## 2023-11-29 PROCEDURE — 1157F PR ADVANCE CARE PLAN OR EQUIV PRESENT IN MEDICAL RECORD: ICD-10-PCS | Mod: HCNC,CPTII,S$GLB, | Performed by: NURSE PRACTITIONER

## 2023-11-29 PROCEDURE — 3077F PR MOST RECENT SYSTOLIC BLOOD PRESSURE >= 140 MM HG: ICD-10-PCS | Mod: HCNC,CPTII,S$GLB, | Performed by: NURSE PRACTITIONER

## 2023-11-29 RX ORDER — SOLIFENACIN SUCCINATE 5 MG/1
5 TABLET, FILM COATED ORAL DAILY
Qty: 30 TABLET | Refills: 11 | Status: SHIPPED | OUTPATIENT
Start: 2023-11-29 | End: 2024-01-11

## 2023-11-29 NOTE — PROGRESS NOTES
Chief Complaint: \  OAB  Mixed incontinence    HPI:   Patient is a 68-year-old female that is presenting as a follow-up to overactive bladder.  Patient has urge incontinence and is wearing a pad that she changes several times a day.  She was prescribed Myrbetriq, however, was hospitalized secondary to an increase in potassium.  Patient reports that primary care provider discontinued Myrbetriq secondary to possible interaction related to increase potassium level.  Patient states that medication decreasing her urge incontinence and daytime frequency.  Is requesting a new medication or treatment option.    Allergies:  Lisinopril, Augmentin [amoxicillin-pot clavulanate], and Zyvox [linezolid]    Medications:  has a current medication list which includes the following prescription(s): biotin, cyclosporine modified (neoral), ergocalciferol, evening primrose oil, furosemide, guaifenesin, hydralazine, multivitamin, nifedipine, pantoprazole, patiromer calcium sorbitex, patiromer calcium sorbitex, polyethylene glycol, prednisone, pregabalin, psyllium husk, retacrit, temazepam, UNABLE TO FIND, vitamin e, zolpidem, calcitonin (salmon), carvedilol, and solifenacin, and the following Facility-Administered Medications: acetaminophen and famotidine.    Review of Systems:  General: No fever, chills, fatigability, or weight loss.  Skin: No rashes, itching, or changes in color or texture of skin.  Chest: Denies DONOHUE, cyanosis, wheezing, cough, and sputum production.  Abdomen: Appetite fine. No weight loss. Denies diarrhea, abdominal pain, hematemesis, or blood in stool.  Musculoskeletal: No joint stiffness or swelling. Denies back pain.  : As above.  All other review of systems negative.    PMH:   has a past medical history of Abdominal wall hernia, Abnormal mammogram (10/12/2021), GRACE (acute kidney injury) (11/22/2021), Anxiety, Arthritis, Breast cancer in female (08/2021), C. difficile colitis (11/29/2021), Cellulitis of axilla,  left (12/23/2021), Chronic diastolic heart failure (12/16/2021), Chronic kidney disease, Chronic midline low back pain without sciatica (06/18/2018), Closed nondisplaced fracture of distal phalanx of left great toe with routine healing (10/22/2018), Coronary artery disease (1993), Cystitis (05/10/2022), Depression, Encounter for blood transfusion, Fibromyalgia, Heart failure, Heart transplanted (1993), History of hyperparathyroidism; Hyperparathyroidism, secondary renal, Hypertension, Immune disorder, 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), Impaired mobility (07/28/2022), Iron deficiency anemia (08/15/2017), Kidney stones, Obesity, Other osteoporosis without current pathological fracture (08/30/2019), Parotitis, acute (9/19/2023), Pharyngitis (1/9/2019), Severe sepsis (11/22/2021), Shingles (2003 approx), Subclinical hypothyroidism (06/16/2023), Thrombocytopenia, unspecified (11/29/2021), Trouble in sleeping, and Urinary incontinence.    PSH:   has a past surgical history that includes Cardiac pacemaker removal (Left, 06/26/2014); Bladder surgery (2015 approx); Carpal tunnel release (Left, 03/03/2015); Hysterectomy (1983); Hernia repair (Right, 1971 approx); Breast surgery (Left, 09/28/2015); Breast surgery (Right, 12/2015); Toe Surgery; Colonoscopy (N/A, 02/25/2021); Breast biopsy (Bilateral); Heart transplant (1993); [place] lymph node biopsy (Left, 10/12/2021); Insertion of tunneled central venous catheter (CVC) with subcutaneous port (N/A, 11/09/2021); Incision and drainage of abscess (Left, 12/24/2021); Removal of vascular access port; Breast lumpectomy (Left, 2021); Injection of anesthetic agent into sacroiliac joint (Right, 08/22/2022); Injection of anesthetic agent around medial branch nerves innervating lumbar facet joint (Right, 10/19/2022); Injection of anesthetic agent around medial branch nerves innervating lumbar facet joint (Right, 11/09/2022); Breast biopsy (Right, 10/31/2022);  Radiofrequency thermocoagulation (Right, 12/07/2022); Epidural steroid injection into cervical spine (N/A, 02/02/2023); Cystocele repair; Robot-assisted laparoscopic abdominal sacrocolpopexy (N/A, 8/10/2023); xi robotic urethropexy (N/A, 8/10/2023); and Robot-assisted laparoscopic oophorectomy (Right, 8/10/2023).    FamHx: family history includes Breast cancer in her maternal grandmother and mother; Cancer (age of onset: 38) in her mother; Cataracts in her cousin; Heart disease in her maternal grandmother; Hypertension in her son.    SocHx:  reports that she has never smoked. She has never used smokeless tobacco. She reports that she does not drink alcohol and does not use drugs.      Physical Exam:  Vitals:    11/29/23 0916   BP: (!) 155/95   Pulse: 97   Resp: 16     General: A&Ox3, no apparent distress, no deformities  Neck: No masses, normal thyroid  Lungs: normal inspiration, no use of accessory muscles  Heart: normal pulse, no arrhythmias  Abdomen: Soft, NT, ND, no masses, no hernias, no hepatosplenomegaly  Lymphatic: Neck and groin nodes negative  Skin: The skin is warm and dry. No jaundice.  Labs/Studies:   Urine in clinic is negative  PVR is 14 mL    Impression/Plan:   Overactive bladder and urge incontinence  Patient was prescribed VESIcare 5 mg to be taken once daily.  Return to clinic in 4 weeks for re-evaluation.

## 2023-12-04 ENCOUNTER — OFFICE VISIT (OUTPATIENT)
Dept: NEPHROLOGY | Facility: CLINIC | Age: 68
End: 2023-12-04
Payer: MEDICARE

## 2023-12-04 VITALS
HEIGHT: 62 IN | SYSTOLIC BLOOD PRESSURE: 134 MMHG | WEIGHT: 159.19 LBS | BODY MASS INDEX: 29.3 KG/M2 | DIASTOLIC BLOOD PRESSURE: 80 MMHG | RESPIRATION RATE: 18 BRPM | HEART RATE: 84 BPM

## 2023-12-04 DIAGNOSIS — N18.4 STAGE 4 CHRONIC KIDNEY DISEASE: Primary | ICD-10-CM

## 2023-12-04 PROCEDURE — 3079F DIAST BP 80-89 MM HG: CPT | Mod: HCNC,CPTII,S$GLB, | Performed by: INTERNAL MEDICINE

## 2023-12-04 PROCEDURE — 3075F SYST BP GE 130 - 139MM HG: CPT | Mod: HCNC,CPTII,S$GLB, | Performed by: INTERNAL MEDICINE

## 2023-12-04 PROCEDURE — 1101F PT FALLS ASSESS-DOCD LE1/YR: CPT | Mod: HCNC,CPTII,S$GLB, | Performed by: INTERNAL MEDICINE

## 2023-12-04 PROCEDURE — 3044F PR MOST RECENT HEMOGLOBIN A1C LEVEL <7.0%: ICD-10-PCS | Mod: HCNC,CPTII,S$GLB, | Performed by: INTERNAL MEDICINE

## 2023-12-04 PROCEDURE — 1157F PR ADVANCE CARE PLAN OR EQUIV PRESENT IN MEDICAL RECORD: ICD-10-PCS | Mod: HCNC,CPTII,S$GLB, | Performed by: INTERNAL MEDICINE

## 2023-12-04 PROCEDURE — 3079F PR MOST RECENT DIASTOLIC BLOOD PRESSURE 80-89 MM HG: ICD-10-PCS | Mod: HCNC,CPTII,S$GLB, | Performed by: INTERNAL MEDICINE

## 2023-12-04 PROCEDURE — 99999 PR PBB SHADOW E&M-EST. PATIENT-LVL IV: ICD-10-PCS | Mod: PBBFAC,HCNC,, | Performed by: INTERNAL MEDICINE

## 2023-12-04 PROCEDURE — 1157F ADVNC CARE PLAN IN RCRD: CPT | Mod: HCNC,CPTII,S$GLB, | Performed by: INTERNAL MEDICINE

## 2023-12-04 PROCEDURE — 3008F PR BODY MASS INDEX (BMI) DOCUMENTED: ICD-10-PCS | Mod: HCNC,CPTII,S$GLB, | Performed by: INTERNAL MEDICINE

## 2023-12-04 PROCEDURE — 1125F AMNT PAIN NOTED PAIN PRSNT: CPT | Mod: HCNC,CPTII,S$GLB, | Performed by: INTERNAL MEDICINE

## 2023-12-04 PROCEDURE — 3288F FALL RISK ASSESSMENT DOCD: CPT | Mod: HCNC,CPTII,S$GLB, | Performed by: INTERNAL MEDICINE

## 2023-12-04 PROCEDURE — 1125F PR PAIN SEVERITY QUANTIFIED, PAIN PRESENT: ICD-10-PCS | Mod: HCNC,CPTII,S$GLB, | Performed by: INTERNAL MEDICINE

## 2023-12-04 PROCEDURE — 3044F HG A1C LEVEL LT 7.0%: CPT | Mod: HCNC,CPTII,S$GLB, | Performed by: INTERNAL MEDICINE

## 2023-12-04 PROCEDURE — 3008F BODY MASS INDEX DOCD: CPT | Mod: HCNC,CPTII,S$GLB, | Performed by: INTERNAL MEDICINE

## 2023-12-04 PROCEDURE — 3288F PR FALLS RISK ASSESSMENT DOCUMENTED: ICD-10-PCS | Mod: HCNC,CPTII,S$GLB, | Performed by: INTERNAL MEDICINE

## 2023-12-04 PROCEDURE — 3066F PR DOCUMENTATION OF TREATMENT FOR NEPHROPATHY: ICD-10-PCS | Mod: HCNC,CPTII,S$GLB, | Performed by: INTERNAL MEDICINE

## 2023-12-04 PROCEDURE — 1101F PR PT FALLS ASSESS DOC 0-1 FALLS W/OUT INJ PAST YR: ICD-10-PCS | Mod: HCNC,CPTII,S$GLB, | Performed by: INTERNAL MEDICINE

## 2023-12-04 PROCEDURE — 1159F MED LIST DOCD IN RCRD: CPT | Mod: HCNC,CPTII,S$GLB, | Performed by: INTERNAL MEDICINE

## 2023-12-04 PROCEDURE — 3066F NEPHROPATHY DOC TX: CPT | Mod: HCNC,CPTII,S$GLB, | Performed by: INTERNAL MEDICINE

## 2023-12-04 PROCEDURE — 3075F PR MOST RECENT SYSTOLIC BLOOD PRESS GE 130-139MM HG: ICD-10-PCS | Mod: HCNC,CPTII,S$GLB, | Performed by: INTERNAL MEDICINE

## 2023-12-04 PROCEDURE — 1159F PR MEDICATION LIST DOCUMENTED IN MEDICAL RECORD: ICD-10-PCS | Mod: HCNC,CPTII,S$GLB, | Performed by: INTERNAL MEDICINE

## 2023-12-04 PROCEDURE — 99999 PR PBB SHADOW E&M-EST. PATIENT-LVL IV: CPT | Mod: PBBFAC,HCNC,, | Performed by: INTERNAL MEDICINE

## 2023-12-04 PROCEDURE — 99215 OFFICE O/P EST HI 40 MIN: CPT | Mod: HCNC,S$GLB,, | Performed by: INTERNAL MEDICINE

## 2023-12-04 PROCEDURE — 99215 PR OFFICE/OUTPT VISIT, EST, LEVL V, 40-54 MIN: ICD-10-PCS | Mod: HCNC,S$GLB,, | Performed by: INTERNAL MEDICINE

## 2023-12-04 NOTE — PROGRESS NOTES
NEPHROLOGY CLINIC FOLLOWUP NOTE  Date of clinic visit: 12/4/23     REASON FOR FOLLOWUP AND CHIEF COMPLAINT: nephrotic syndrome, CKD post heart transplant, HTN, nephrolithiasis, hypercalcemia     HISTORY OF PRESENT ILLNESS: Ms. Damon is a 68-year-old female with a history of CKD stage 4 (secondary to kidney biopsy (11/10/20) proven calcineurin inhibitor nephrotoxicity (due to cyclosporine) and hypertensive nephrosclerosis), nephrolithiasis (likely mixed stones), and heart transplant in 1993 (is on cyclosporine), who presents for f/u. Pt was last seen by me in ER about 3 weeks ago. She had been asked to come to ER for hyperkalemia. She had no c/o's. Hyperkalemia is chronic. Pt was placed on veltassa, which she has picked up from pharmacy.       On f/u today, pt reports no new c/o's, no SOB.     To review: Past mild, persistent hypercalcemia reviewed. Previously suspected related to primary hyperparathyroidism (HPT) is suspected. Sestamibi nuclear scan of the neck did not show any adenomas. Her risk factors for kidney stones include hypercalcemia and being on cyclosporine for prevention of heart transplant rejection, which causes hyperuricemia, and prior intake of vitamin C.. Pt has a h/o of osteopenia.     To review, pt also has breast cancer (she did have persistent mild hypercalcemia in the past). Hem/onc notes were reviewed. 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 Pt has had further complication, requiring hospital admission including pancytopenia, neutropenic fever, axillary cellulitis, and C diff colitis.             PAST MEDICAL HISTORY:  1. CKD stage IV. Nephrotic syndrome. Due to chronic calcineurin inhibitor toxicity due to taking cyclosporine, kidney biopsy was done on 11/10/21  2. Hypertension.  3. Heart transplant for cardiomyopathy in 1993, the patient has been on chronic  cyclosporine treatment.  4. Carpal  tunnel syndrome.  5. Fibromyalgia.  6. GERD.  7. Bell's palsy.  8. Depression.     PAST SURGICAL HISTORY: Reviewed as above, unchanged.     MEDICATIONS: Reviewed     Current Outpatient Medications:     biotin 10,000 mcg Cap, Take 1 tablet by mouth once daily., Disp: , Rfl:     calcitonin, salmon, (FORTICAL) 200 unit/actuation nasal spray, 1 spray by Nasal route once daily., Disp: 3 each, Rfl: 3    carvediloL (COREG) 6.25 MG tablet, Take 1 tablet (6.25 mg total) by mouth 2 (two) times daily with meals., Disp: 180 tablet, Rfl: 3    cycloSPORINE modified, NEORAL, (NEORAL) 25 MG capsule, Take 3 capsules (75 mg total) by mouth 2 (two) times daily., Disp: 540 capsule, Rfl: 1    ergocalciferol (VITAMIN D2) 50,000 unit Cap, Take 50,000 Units by mouth every 7 days., Disp: , Rfl:     EVENING PRIMROSE OIL ORAL, Take 1,000 mg by mouth once daily., Disp: , Rfl:     furosemide (LASIX) 20 MG tablet, Take 1 tablet (20 mg total) by mouth once daily., Disp: 90 tablet, Rfl: 1    guaiFENesin (MUCINEX) 600 mg 12 hr tablet, Take 1,200 mg by mouth as needed., Disp: , Rfl:     hydrALAZINE (APRESOLINE) 50 MG tablet, Take 1 tablet (50 mg total) by mouth every 8 (eight) hours., Disp: 270 tablet, Rfl: 3    multivitamin capsule, Take 1 capsule by mouth once daily., Disp: , Rfl:     NIFEdipine (PROCARDIA-XL) 30 MG (OSM) 24 hr tablet, Take 1 tablet (30 mg total) by mouth once daily., Disp: 30 tablet, Rfl: 11    pantoprazole (PROTONIX) 20 MG tablet, Take 1 tablet (20 mg total) by mouth once daily., Disp: 30 tablet, Rfl: 11    patiromer calcium sorbitex (VELTASSA) 8.4 gram PwPk, Take 1 packet (8.4 g total) by mouth once daily. for 90 doses, Disp: 30 packet, Rfl: 2    polyethylene glycol (GLYCOLAX) 17 gram/dose powder, Take 17 g by mouth once daily., Disp: , Rfl:     pregabalin (LYRICA) 50 MG capsule, Take 1 capsule (50 mg total) by mouth 2 (two) times daily., Disp: 180 capsule, Rfl: 1    psyllium husk (METAMUCIL ORAL), Take 17 g by mouth once  daily., Disp: , Rfl:     RETACRIT 20,000 unit/mL injection, , Disp: , Rfl:     solifenacin (VESICARE) 5 MG tablet, Take 1 tablet (5 mg total) by mouth once daily., Disp: 30 tablet, Rfl: 11    temazepam (RESTORIL) 30 mg capsule, Take 1 capsule (30 mg total) by mouth nightly as needed for Insomnia., Disp: 30 capsule, Rfl: 5    UNABLE TO FIND, medication name: Stool softener (Ducolax) Laxative, Disp: , Rfl:     vitamin E 400 UNIT capsule, Take 400 Units by mouth once daily., Disp: , Rfl:     zolpidem (AMBIEN) 10 mg Tab, Take 1 tablet (10 mg total) by mouth nightly as needed (insomnia)., Disp: 90 tablet, Rfl: 0    patiromer calcium sorbitex (VELTASSA) 8.4 gram PwPk, Take 1 packet (8.4 g total) by mouth every other day., Disp: 15 packet, Rfl: 11      SOCIAL HISTORY: Reviewed. Negative for smoking. No alcohol use.      REVIEW OF SYSTEMS: No recent hospitalizations.  GENERAL: Negative.  HEAD, EYES, EARS, NOSE, AND THROAT: Negative.  CARDIAC: Negative.  PULMONARY: Negative.  GASTROINTESTINAL: Negative.  GENITOURINARY: Negative.  PSYCHOLOGICAL: Negative.  NEUROLOGICAL: Negative.  ENDOCRINE: Negative.  HEMATOLOGIC AND ONCOLOGIC: Negative.  INFECTIOUS DISEASE: Negative.  The rest of the review of systems negative.     PHYSICAL EXAMINATION:  VITAL SIGNS: Repeat blood pressure is 134/80, Pulse is 84, weight is 72.2 Kg, from  78.2 Kg, from 74.8 Kg,   GENERAL: She is cooperative, pleasant, in no acute distress.   Speech and thought process appropriate, normal.  HEENT: Mucous membranes moist.  NECK: Neck JVD. Normal hearing.  ABDOMEN: Soft, nontender.  EXTREMITIES: trace pitting edema.     LABS: Reviewed.    BMP  Lab Results   Component Value Date     11/15/2023    K 5.2 (H) 11/15/2023     (H) 11/15/2023    CO2 16 (L) 11/15/2023    BUN 43 (H) 11/15/2023    CREATININE 3.3 (H) 11/15/2023    CALCIUM 8.8 11/15/2023    ANIONGAP 12 11/15/2023    EGFRNORACEVR 15 (A) 11/15/2023     Lab Results   Component Value Date    WBC 2.50  (L) 11/15/2023    HGB 9.5 (L) 11/15/2023    HCT 30.0 (L) 11/15/2023    MCV 89 11/15/2023     11/15/2023       Lab Results   Component Value Date    .9 (H) 10/11/2023    CALCIUM 8.8 11/15/2023    CAION 1.13 09/05/2018    PHOS 4.6 (H) 11/15/2023              Urine protein/cr 0.54 g, from 0.32 g, from 0.49 g, from 2.2 g, 6.0 g  U/a: no protein (from 3+), no blood, 4 RBC's, no casts     Complements C3 and C4 both normal     To review older labs:   24 hour urine: Ca not measured, uric acid 138, Oxalate 20, citrate 203  U/a unremarkable  Urine Cx: neg   Urine P/Cr 740 mg     KUB: unremarkable, except for some constipation     Renal u/s: FINDINGS: 10/22/20  Kidneys measure 10.3 and 9.7 cm in length on the right left respectively.  Cortex is smoothly marginated well maintained with mild diffuse increased echotexture.  Appearance suggest medical renal disease.  Two simple cyst identified within the right kidney.  Upper pole cyst 1.4 cm maximum diameter and inferior pole cyst 2.2 cm maximum diameter.  There is 9 mm simple cyst within the left inferior pole.  Resistive indices are 0.66 and 0.76 on the right left respectively.  Urinary bladder partially distended without focal or diffuse wall thickening.     CT abd: The left kidney appears slightly small.  Right kidney is grossly normal in size.  No obvious hydronephrosis or nephrolithiasis     Sestamibi nuclear scan of the neck: 8/29/19: no adenomas        Kidney biopsy from 11/10/21:                   ASSESSMENT AND PLAN: This is a 68-year-old female who presents for CKD follow up:  Patient has a h/o of heart transplant  The impression is as follows:     1. Renal: s Cr stable, not worse on this visit, overall slowly progressive CKD  Stable renal function. Stage 4 CKD  Nephrotic syndrome: good response to losartan: proteinuria further improved from 6 to 2.2 g, and now to < 1 g, stable  BP controlled      Complications of CKD:  K was elevated. Improved and lower  on veltassa, continue qod  Na normal  Acid base stable  Ca normal  PO4 normal  Secondary hyperparathyroidism: moderate. Will hold treatment with calcitriol, because in the past had hypercalcemia (and past h/o of kidney stones), and is currently on calcitonin to keep ca lower. May continue ergocalciferol  Anemia:mild, multifactorial, seeing hem/onc     Kidney biopsy showed:  Damage by HTN (hypertensive nephrosclerosis) as well as calcineurin inhibitor toxicity due to taking cyclosporine to prevent heart transplant rejection. There was 50% chronic interstitial fibrosis.  Pt has been advised of the kidney biopsy in layman's language  She was specifically advised NOT to stop cyclosporine of change the dose on her own.     2. HTN: BP controlled  Reviewed meds with pt     3. Nephrolithiasis: no new stones. To review:  Has multiple risk factors for kidney stones: (hyperuricemia from cyclosporine, diet rich in uric acid, previously taking Vit C, vit D, and calcium,, CKD, and having primary hyperparathyroidism)  Stones are likely mixed ca oxalate and uric acid kidney stones  No longer taking Vit C  No longer takes Vit D and Ca.   Mild hyperuricemia, from cyclosporine.  F/u CT did not show any stones  U/s showed non-obstructive 2 R stones, relatively large  May have passed the stones  24 hour urine for kidney stone stratification shows hypocitraturia  Pt was advised NOT TO STOP cyclosporine.     4. History of heart transplant on cyclosporine  Per heart transplant team.  Do NOT stop cyclosporine      5. Elevated iPTH, likely has primary hyperparathyroidism (HPT)  No adenomas found on sestamibi nulcear scan  May have diffuse hyperplasia  Primary HPT can explain increased risk of kidney stones     6. New diagnosis of breast cancer: since her last visit with us (she did have persistent mild hypercalcemia in the past). Hem/onc notes were reviewed. 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 Pt has had further complication, requiring hospital admission including pancytopenia, neutropenic fever, axillary cellulitis, and C diff colitis.  Will defer to hem/onc     7. Anemia: normocytic. Likely multifactorial: due to CKD and cancer.  Advised pt to have PRBC transfusion. Last transfusion was in Nov 2021. Pt declined  Has f/u with hem/onc on 1/20/21. Please evaluate for epogen and IV iron therapy.              PLANS AND RECOMMENDATIONS:   As discussed above  Opportunity for questions provided  Complicated case, multiple issues addressed  RTC 6 months  Total time spent 40 minutes. Extensive time spent including the time to review the records, the patient evaluation, documentation, face-to-face  discussion with the patient. More than 50% of the time was spent in counseling  and coordination of care. Level V visit.     Carter Crawford MD

## 2023-12-07 ENCOUNTER — OFFICE VISIT (OUTPATIENT)
Dept: PODIATRY | Facility: CLINIC | Age: 68
End: 2023-12-07
Payer: MEDICARE

## 2023-12-07 VITALS — WEIGHT: 159 LBS | HEIGHT: 62 IN | BODY MASS INDEX: 29.26 KG/M2

## 2023-12-07 DIAGNOSIS — M20.62 ACQUIRED DEFORMITY OF TOE, LEFT: Primary | ICD-10-CM

## 2023-12-07 DIAGNOSIS — S90.122S: ICD-10-CM

## 2023-12-07 DIAGNOSIS — M20.42 HAMMER TOE OF LEFT FOOT: ICD-10-CM

## 2023-12-07 DIAGNOSIS — Z98.1 HISTORY OF SURGICAL FUSION JOINT: ICD-10-CM

## 2023-12-07 DIAGNOSIS — M79.675 PAIN IN LEFT TOE(S): ICD-10-CM

## 2023-12-07 PROCEDURE — 1157F ADVNC CARE PLAN IN RCRD: CPT | Mod: HCNC,CPTII,S$GLB, | Performed by: PODIATRIST

## 2023-12-07 PROCEDURE — 1160F RVW MEDS BY RX/DR IN RCRD: CPT | Mod: HCNC,CPTII,S$GLB, | Performed by: PODIATRIST

## 2023-12-07 PROCEDURE — 1101F PR PT FALLS ASSESS DOC 0-1 FALLS W/OUT INJ PAST YR: ICD-10-PCS | Mod: HCNC,CPTII,S$GLB, | Performed by: PODIATRIST

## 2023-12-07 PROCEDURE — 1159F MED LIST DOCD IN RCRD: CPT | Mod: HCNC,CPTII,S$GLB, | Performed by: PODIATRIST

## 2023-12-07 PROCEDURE — 99999 PR PBB SHADOW E&M-EST. PATIENT-LVL II: ICD-10-PCS | Mod: PBBFAC,HCNC,, | Performed by: PODIATRIST

## 2023-12-07 PROCEDURE — 99999 PR PBB SHADOW E&M-EST. PATIENT-LVL II: CPT | Mod: PBBFAC,HCNC,, | Performed by: PODIATRIST

## 2023-12-07 PROCEDURE — 1159F PR MEDICATION LIST DOCUMENTED IN MEDICAL RECORD: ICD-10-PCS | Mod: HCNC,CPTII,S$GLB, | Performed by: PODIATRIST

## 2023-12-07 PROCEDURE — 3288F PR FALLS RISK ASSESSMENT DOCUMENTED: ICD-10-PCS | Mod: HCNC,CPTII,S$GLB, | Performed by: PODIATRIST

## 2023-12-07 PROCEDURE — 3066F NEPHROPATHY DOC TX: CPT | Mod: HCNC,CPTII,S$GLB, | Performed by: PODIATRIST

## 2023-12-07 PROCEDURE — 1160F PR REVIEW ALL MEDS BY PRESCRIBER/CLIN PHARMACIST DOCUMENTED: ICD-10-PCS | Mod: HCNC,CPTII,S$GLB, | Performed by: PODIATRIST

## 2023-12-07 PROCEDURE — 1125F PR PAIN SEVERITY QUANTIFIED, PAIN PRESENT: ICD-10-PCS | Mod: HCNC,CPTII,S$GLB, | Performed by: PODIATRIST

## 2023-12-07 PROCEDURE — 3066F PR DOCUMENTATION OF TREATMENT FOR NEPHROPATHY: ICD-10-PCS | Mod: HCNC,CPTII,S$GLB, | Performed by: PODIATRIST

## 2023-12-07 PROCEDURE — 1101F PT FALLS ASSESS-DOCD LE1/YR: CPT | Mod: HCNC,CPTII,S$GLB, | Performed by: PODIATRIST

## 2023-12-07 PROCEDURE — 1157F PR ADVANCE CARE PLAN OR EQUIV PRESENT IN MEDICAL RECORD: ICD-10-PCS | Mod: HCNC,CPTII,S$GLB, | Performed by: PODIATRIST

## 2023-12-07 PROCEDURE — 1125F AMNT PAIN NOTED PAIN PRSNT: CPT | Mod: HCNC,CPTII,S$GLB, | Performed by: PODIATRIST

## 2023-12-07 PROCEDURE — 99214 OFFICE O/P EST MOD 30 MIN: CPT | Mod: HCNC,S$GLB,, | Performed by: PODIATRIST

## 2023-12-07 PROCEDURE — 3008F PR BODY MASS INDEX (BMI) DOCUMENTED: ICD-10-PCS | Mod: HCNC,CPTII,S$GLB, | Performed by: PODIATRIST

## 2023-12-07 PROCEDURE — 3288F FALL RISK ASSESSMENT DOCD: CPT | Mod: HCNC,CPTII,S$GLB, | Performed by: PODIATRIST

## 2023-12-07 PROCEDURE — 3008F BODY MASS INDEX DOCD: CPT | Mod: HCNC,CPTII,S$GLB, | Performed by: PODIATRIST

## 2023-12-07 PROCEDURE — 3044F HG A1C LEVEL LT 7.0%: CPT | Mod: HCNC,CPTII,S$GLB, | Performed by: PODIATRIST

## 2023-12-07 PROCEDURE — 99214 PR OFFICE/OUTPT VISIT, EST, LEVL IV, 30-39 MIN: ICD-10-PCS | Mod: HCNC,S$GLB,, | Performed by: PODIATRIST

## 2023-12-07 PROCEDURE — 3044F PR MOST RECENT HEMOGLOBIN A1C LEVEL <7.0%: ICD-10-PCS | Mod: HCNC,CPTII,S$GLB, | Performed by: PODIATRIST

## 2023-12-07 NOTE — PROGRESS NOTES
Subjective:       Patient ID: Nadia Damon is a 68 y.o. female.    Chief Complaint: Anemia    Primary Oncologist/Hematologist: Dr. Collins     HPI: Ms. Damon is a 68 year old female who is following up for her anemia due to CKD. Epo has been initiated, last dose 11/27/23-20kU.  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 She was treated with 1 cycle of systemic chemotherapy cytoxan and taxotere and udenyca that was discontinued due to toxicity. She had radiation, completed 4/14/22.   She then has abnormal mammogram of R breast. S/p bx, which is benign.   Pmhx: heart transplant 26 yrs ago, on anti rejection medication. chronic back pain and disc degeneration, s/p ROBOTIC SACROCOLPOPEXY, ABDOMEN, ROBOTIC URETHROPEXY, ROBOTIC OOPHORECTOMY (Right) on 8/10/23 due to  stress urinary incontinence and vaginal prolapse. She had hospital visit for hyperkalemia, following with nephrology for this    Today: She states she has been well. She does complain of itching and hives on L arm. She states it started when she was taking the veltessa for her hyperkalemia. She states she has been more conscious about what foods she is eating. She also has orthotic boot on her L food for problem with middle toe, following with podiatry for this. She denies any breast changes.     Social History     Socioeconomic History    Marital status: Single    Number of children: 2    Highest education level: 11th grade   Occupational History    Occupation: Retired   Tobacco Use    Smoking status: Never     Passive exposure: Never    Smokeless tobacco: Never   Substance and Sexual Activity    Alcohol use: Never     Alcohol/week: 0.0 standard drinks of alcohol    Drug use: No    Sexual activity: Not Currently     Partners: Male     Birth control/protection: See Surgical Hx   Other Topics Concern    Are you pregnant or think you may be? No    Breast-feeding No   Social History  Narrative    Single. 2 children , 1  at 30 yoa   strep throat -  pneumonia and renal complications after not completing course of AB. Other child lives in [place], [place]. Has a cousin locally that could help in an emergency. 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 Caffeine intake =- 1 cola a day. No coffee, + occasional tea, avoids caffeine especially at night. Still drives. She does not have a Living Will or Advanced directive.      Social Determinants of Health     Financial Resource Strain: Low Risk  (2023)    Overall Financial Resource Strain (CARDIA)     Difficulty of Paying Living Expenses: Not hard at all   Food Insecurity: No Food Insecurity (2023)    Hunger Vital Sign     Worried About Running Out of Food in the Last Year: Never true     Ran Out of Food in the Last Year: Never true   Transportation Needs: No Transportation Needs (2023)    PRAPARE - Transportation     Lack of Transportation (Medical): No     Lack of Transportation (Non-Medical): No   Physical Activity: Sufficiently Active (2023)    Exercise Vital Sign     Days of Exercise per Week: 3 days     Minutes of Exercise per Session: 60 min   Recent Concern: Physical Activity - Insufficiently Active (2023)    Exercise Vital Sign     Days of Exercise per Week: 2 days     Minutes of Exercise per Session: 60 min   Stress: No Stress Concern Present (2023)    [employer] [place] [employer] - Occupational Stress Questionnaire     Feeling of Stress : Not at all   Social Connections: Moderately Isolated (2023)    Social Connection and Isolation Panel [NHANES]     Frequency of Communication with Friends and Family: More than three times a week     Frequency of Social Gatherings with Friends and Family: More than three times a week     Attends Mu-ism Services: More than 4 times per year     Active Member of Clubs or Organizations: No     Attends Club or Organization  Meetings: Never     Marital Status: Never    Housing Stability: Low Risk  (11/16/2023)    Housing Stability Vital Sign     Unable to Pay for Housing in the Last Year: No     Number of Places Lived in the Last Year: 1     Unstable Housing in the Last Year: No       Past Medical History:   Diagnosis Date    Abdominal wall hernia     CT Renal 6/11/2018---Small fat containing superior ventral abdominal wall hernia at the epicardial pacing lead site.    Abnormal mammogram 10/12/2021    GRACE (acute kidney injury) 11/22/2021    Anxiety     Arthritis     ZEN HIPS    Breast cancer in female 08/2021    LEFT BREAST    C. difficile colitis 11/29/2021    Cellulitis of axilla, left 12/23/2021    Chronic diastolic heart failure 12/16/2021    Chronic kidney disease     stage 4, GFR 15-29 ml/min    Chronic midline low back pain without sciatica 06/18/2018    Closed nondisplaced fracture of distal phalanx of left great toe with routine healing 10/22/2018    Coronary artery disease 1993    heart transplant    Cystitis 05/10/2022    Depression     Encounter for blood transfusion     Fibromyalgia     on Lyrica    Heart failure     native heart cardiomyopathy    Heart transplanted 1993    due to cardiomyopathy    History of hyperparathyroidism; Hyperparathyroidism, secondary renal     PT DENIES    Hypertension     Immune disorder     anti rejection meds    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    Impaired mobility 07/28/2022    Iron deficiency anemia 08/15/2017    Kidney stones     passed per pt    Obesity     Other osteoporosis without current pathological fracture 08/30/2019    Parotitis, acute 9/19/2023    Pharyngitis 1/9/2019    Severe sepsis 11/22/2021    Shingles 2003 approx    left leg    Subclinical hypothyroidism 06/16/2023    Thrombocytopenia, unspecified 11/29/2021    Trouble in sleeping     Urinary incontinence        Family History   Problem Relation Age of Onset    Cancer Mother 38        breast     Breast cancer Mother     Breast cancer Maternal Grandmother     Heart disease Maternal Grandmother     Hypertension Son     Cataracts Cousin     Diabetes Neg Hx     Stroke Neg Hx     Kidney disease Neg Hx     Asthma Neg Hx     COPD Neg Hx     Melanoma Neg Hx     Hyperlipidemia Neg Hx        Past Surgical History:   Procedure Laterality Date    BLADDER SURGERY  2015 approx    mesh - Dr Everett then 2nd reconstructive sx Dr Onofre    BREAST BIOPSY Bilateral     NEGATIVE    BREAST BIOPSY Right 10/31/2022    benign    BREAST LUMPECTOMY Left 2021    BREAST SURGERY Left 09/28/2015    Bx - benign    BREAST SURGERY Right 12/2015    Bx benign    CARDIAC PACEMAKER REMOVAL Left 06/26/2014    Pacer defirillator removed. Put in 1993 aat time of heart transplant    CARPAL TUNNEL RELEASE Left 03/03/2015    Dr. Hall    COLONOSCOPY N/A 02/25/2021    Procedure: COLONOSCOPY;  Surgeon: Freida Ramirez MD;  Location: [facility];  Service: Endoscopy;  Laterality: N/A;    CYSTOCELE REPAIR      Twice with mesh removal    EPIDURAL STEROID INJECTION INTO CERVICAL SPINE N/A 02/02/2023    Procedure: T11/T12 IL HELLEN;  Surgeon: Jassi Pierre MD;  Location: [facility];  Service: Pain Management;  Laterality: N/A;    HEART TRANSPLANT  1993    HERNIA REPAIR Right 1971 approx    Inguinal    HYSTERECTOMY  1983    vag hyst /LSO     INCISION AND DRAINAGE OF ABSCESS Left 12/24/2021    Procedure: INCISION AND DRAINAGE, ABSCESS;  Surgeon: Joseph Longo MD;  Location: [facility] OR;  Service: General;  Laterality: Left;    INJECTION OF ANESTHETIC AGENT AROUND MEDIAL BRANCH NERVES INNERVATING LUMBAR FACET JOINT Right 10/19/2022    Procedure: Right L4/L5 and L5/S1 MBB;  Surgeon: Jassi Pierre MD;  Location: [facility];  Service: Pain Management;  Laterality: Right;    INJECTION OF ANESTHETIC AGENT AROUND MEDIAL BRANCH NERVES INNERVATING LUMBAR FACET JOINT Right 11/09/2022    Procedure: Right L4/L5 and L5/S1 MBB;  Surgeon: Jassi Pierre  MD;  Location: [facility];  Service: Pain Management;  Laterality: Right;    INJECTION OF ANESTHETIC AGENT INTO SACROILIAC JOINT Right 08/22/2022    Procedure: Right SIJ Injection Right L5/S1 Facte Injection;  Surgeon: Jassi Pierre MD;  Location: [facility];  Service: Pain Management;  Laterality: Right;    INSERTION OF TUNNELED CENTRAL VENOUS CATHETER (CVC) WITH SUBCUTANEOUS PORT N/A 11/09/2021    Procedure: [id number];  Surgeon: Christoph Douglas MD;  Location: [facility] OR;  Service: General;  Laterality: N/A;    RADIOFREQUENCY THERMOCOAGULATION Right 12/07/2022    Procedure: Right L4/L5 and L5/S1 Lumbar RFA;  Surgeon: Jassi Pierre MD;  Location: [facility];  Service: Pain Management;  Laterality: Right;    REMOVAL OF VASCULAR ACCESS PORT      ROBOT-ASSISTED LAPAROSCOPIC ABDOMINAL SACROCOLPOPEXY N/A 8/10/2023    Procedure: ROBOTIC SACROCOLPOPEXY, ABDOMEN;  Surgeon: PRANAY Villalobos MD;  Location: [facility];  Service: OB/GYN;  Laterality: N/A;    ROBOT-ASSISTED LAPAROSCOPIC OOPHORECTOMY Right 8/10/2023    Procedure: ROBOTIC OOPHORECTOMY;  Surgeon: PRANAY Villalobos MD;  Location: [facility];  Service: OB/GYN;  Laterality: Right;    SENTINEL LYMPH NODE BIOPSY Left 10/12/2021    Procedure: BIOPSY, LYMPH NODE, SENTINEL;  Surgeon: Christoph Douglas MD;  Location: [facility] OR;  Service: General;  Laterality: Left;    TOE SURGERY      XI ROBOTIC URETHROPEXY N/A 8/10/2023    Procedure: XI ROBOTIC URETHROPEXY;  Surgeon: PRANAY Villalobos MD;  Location: [facility] OR;  Service: OB/GYN;  Laterality: N/A;       Review of Systems   Constitutional:  Negative for activity change, appetite change, chills, diaphoresis, fatigue, fever and unexpected weight change.   HENT:  Negative for congestion and nosebleeds.    Respiratory:  Negative for cough and shortness of breath.    Cardiovascular:  Negative for chest pain and leg swelling.   Gastrointestinal:  Negative for abdominal pain, blood in stool, constipation,  diarrhea, nausea and vomiting.   Genitourinary:  Negative for hematuria.   Musculoskeletal:  Positive for arthralgias and back pain.   Skin:  Negative for color change and pallor.   Allergic/Immunologic: Positive for immunocompromised state.   Neurological:  Negative for dizziness, weakness, light-headedness and headaches.   Hematological:  Does not bruise/bleed easily.         Medication List with Changes/Refills   New Medications    HYDROCORTISONE 1 % CREAM    Apply topically 2 (two) times daily.   Current Medications    BIOTIN 10,000 MCG CAP    Take 1 tablet by mouth once daily.    CALCITONIN, SALMON, (FORTICAL) 200 UNIT/ACTUATION NASAL SPRAY    1 spray by Nasal route once daily.    CARVEDILOL (COREG) 6.25 MG TABLET    Take 1 tablet (6.25 mg total) by mouth 2 (two) times daily with meals.    CYCLOSPORINE MODIFIED, NEORAL, (NEORAL) 25 MG CAPSULE    Take 3 capsules (75 mg total) by mouth 2 (two) times daily.    ERGOCALCIFEROL (VITAMIN D2) 50,000 UNIT CAP    Take 50,000 Units by mouth every 7 days.    EVENING PRIMROSE OIL ORAL    Take 1,000 mg by mouth once daily.    FUROSEMIDE (LASIX) 20 MG TABLET    Take 1 tablet (20 mg total) by mouth once daily.    GUAIFENESIN (MUCINEX) 600 MG 12 HR TABLET    Take 1,200 mg by mouth as needed.    HYDRALAZINE (APRESOLINE) 50 MG TABLET    Take 1 tablet (50 mg total) by mouth every 8 (eight) hours.    MULTIVITAMIN CAPSULE    Take 1 capsule by mouth once daily.    NIFEDIPINE (PROCARDIA-XL) 30 MG (OSM) 24 HR TABLET    Take 1 tablet (30 mg total) by mouth once daily.    PANTOPRAZOLE (PROTONIX) 20 MG TABLET    Take 1 tablet (20 mg total) by mouth once daily.    PATIROMER CALCIUM SORBITEX (VELTASSA) 8.4 GRAM PWPK    Take 1 packet (8.4 g total) by mouth once daily. for 90 doses    PATIROMER CALCIUM SORBITEX (VELTASSA) 8.4 GRAM PWPK    Take 1 packet (8.4 g total) by mouth every other day.    POLYETHYLENE GLYCOL (GLYCOLAX) 17 GRAM/DOSE POWDER    Take 17 g by mouth once daily.    PREGABALIN  (LYRICA) 50 MG CAPSULE    Take 1 capsule (50 mg total) by mouth 2 (two) times daily.    PSYLLIUM HUSK (METAMUCIL ORAL)    Take 17 g by mouth once daily.    RETACRIT 20,000 UNIT/ML INJECTION        SOLIFENACIN (VESICARE) 5 MG TABLET    Take 1 tablet (5 mg total) by mouth once daily.    TEMAZEPAM (RESTORIL) 30 MG CAPSULE    Take 1 capsule (30 mg total) by mouth nightly as needed for Insomnia.    UNABLE TO FIND    medication name: Stool softener (Ducolax) Laxative    VITAMIN E 400 UNIT CAPSULE    Take 400 Units by mouth once daily.    ZOLPIDEM (AMBIEN) 10 MG TAB    Take 1 tablet (10 mg total) by mouth nightly as needed (insomnia).     Objective:     Vitals:    12/11/23 1322   BP: 129/80   Pulse: 92   Temp: 98.1 °F (36.7 °C)     Physical Exam  Constitutional:       General: She is not in acute distress.     Appearance: She is not ill-appearing, toxic-appearing or diaphoretic.   HENT:      Head: Normocephalic and atraumatic.   Eyes:      Conjunctiva/sclera: Conjunctivae normal.   Cardiovascular:      Rate and Rhythm: Normal rate.   Pulmonary:      Effort: Pulmonary effort is normal.   Musculoskeletal:      Right lower leg: No edema.      Left lower leg: No edema.      Comments: Orthotic boot on L foot   Skin:     General: Skin is warm and dry.      Coloration: Skin is not jaundiced or pale.      Findings: No bruising, erythema, lesion or rash.   Neurological:      Mental Status: She is alert.      Gait: Gait normal.            Labs/Results:  Lab Results   Component Value Date    WBC 3.89 (L) 12/11/2023    RBC 3.42 (L) 12/11/2023    HGB 9.8 (L) 12/11/2023    HCT 30.8 (L) 12/11/2023    MCV 90 12/11/2023    MCH 28.7 12/11/2023    MCHC 31.8 (L) 12/11/2023    RDW 17.1 (H) 12/11/2023     12/11/2023    MPV 9.6 12/11/2023    GRAN 2.3 12/11/2023    GRAN 58.8 12/11/2023    LYMPH 0.9 (L) 12/11/2023    LYMPH 22.9 12/11/2023    MONO 0.5 12/11/2023    MONO 13.9 12/11/2023    EOS 0.1 12/11/2023    BASO 0.01 12/11/2023     EOSINOPHIL 3.6 12/11/2023    BASOPHIL 0.3 12/11/2023        Latest Reference Range & Units 11/14/23 12:46   Iron 30 - 160 ug/dL 135   TIBC 250 - 450 ug/dL 327   Saturated Iron 20 - 50 % 41   Transferrin 200 - 375 mg/dL 221   Ferritin 20.0 - 300.0 ng/mL 97   CMP  Sodium   Date Value Ref Range Status   12/11/2023 146 (H) 136 - 145 mmol/L Final     Potassium   Date Value Ref Range Status   12/11/2023 5.3 (H) 3.5 - 5.1 mmol/L Final     Chloride   Date Value Ref Range Status   12/11/2023 112 (H) 95 - 110 mmol/L Final     CO2   Date Value Ref Range Status   12/11/2023 23 23 - 29 mmol/L Final     Glucose   Date Value Ref Range Status   12/11/2023 96 70 - 110 mg/dL Final     BUN   Date Value Ref Range Status   12/11/2023 46 (H) 8 - 23 mg/dL Final     Creatinine   Date Value Ref Range Status   12/11/2023 2.5 (H) 0.5 - 1.4 mg/dL Final     Calcium   Date Value Ref Range Status   12/11/2023 8.6 (L) 8.7 - 10.5 mg/dL Final     Total Protein   Date Value Ref Range Status   12/11/2023 7.5 6.0 - 8.4 g/dL Final     Albumin   Date Value Ref Range Status   12/11/2023 3.4 (L) 3.5 - 5.2 g/dL Final     Total Bilirubin   Date Value Ref Range Status   12/11/2023 0.4 0.1 - 1.0 mg/dL Final     Comment:     For infants and newborns, interpretation of results should be based  on gestational age, weight and in agreement with clinical  observations.    Premature Infant recommended reference ranges:  Up to 24 hours.............<8.0 mg/dL  Up to 48 hours............<12.0 mg/dL  3-5 days..................<15.0 mg/dL  6-29 days.................<15.0 mg/dL       Alkaline Phosphatase   Date Value Ref Range Status   12/11/2023 115 55 - 135 U/L Final     AST   Date Value Ref Range Status   12/11/2023 33 10 - 40 U/L Final     ALT   Date Value Ref Range Status   12/11/2023 25 10 - 44 U/L Final     Anion Gap   Date Value Ref Range Status   12/11/2023 11 8 - 16 mmol/L Final     eGFR   Date Value Ref Range Status   12/11/2023 20 (A) >60 mL/min/1.73 m^2 Final        Assessment:     Problem List Items Addressed This Visit          Renal/    CKD (chronic kidney disease) stage 4, GFR 15-29 ml/min - Primary (Chronic)    Relevant Orders    CBC Auto Differential    Comprehensive Metabolic Panel    Ferritin    Iron and TIBC    CBC Auto Differential       Immunology/Multi System    Immunocompromised patient    Relevant Medications    hydrocortisone 1 % cream       Oncology    Anemia associated with stage 4 chronic renal failure (Chronic)    Relevant Orders    CBC Auto Differential    Comprehensive Metabolic Panel    Ferritin    Iron and TIBC    CBC Auto Differential    Ductal carcinoma in situ (DCIS) of left breast     Plan:     Ductal carcinoma in situ (DCIS) of left breast  --continue follow up with radiation oncology and survivorship  --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 She was treated with 1 cycle of systemic chemotherapy cytoxan and taxotere and udenyca that was discontinued due to toxicity. She had radiation, completed 4/14/22  --R abnormal mammogram and Bx-benign.    --continue to follow with breast surg, will have bilat diag mammo/exam in 4/2024  --[facility] diagnostic mammo 4/10/2023- wnl    Iron deficiency anemia, unspecified iron deficiency anemia type  --iron: 135, sat: 41%, ferritin: 97- WNL  --continue to monitor    Anemia associated with stage 4 chronic renal failure  --hgb:9.8, hmt:30.8  --hgb ranges from 9-11g/dL.   --patient would like to hold off on any further epo injections if hemoglobin remains over 9g/dl, which is reasonable given her hgb is stable.  --epo 20kU for hgb<10g/dL  --last epo 11/27/23  --kidney function consistent       Immunocompromised patient  --on immunosuppressive medications  --continue to monitor  --following up with transplant   --WBC: 3.89     Follow-Up: cbc monthly with possible epo. 4 months with cbc cmp iron/tibc ferritin prior     Kelsie Burk PA-C  Hematology Oncology    Route  Chart for Scheduling    Med Onc Chart Routing      Follow up with physician    Follow up with LIA . Cbc monthly with poss epo. 4 months with cbc cmp iron/tibc ferritin prior   Infusion scheduling note   cbc monthly with poss epo   Injection scheduling note    Labs CBC, CMP, ferritin and iron and TIBC   Scheduling:  Preferred lab:  Lab interval:  cbc monthly   Imaging    Pharmacy appointment    Other referrals                    Therapy Plan Information  epoetin tristan-epbx injection 20,000 Units  20,000 Units, Subcutaneous, PRN

## 2023-12-07 NOTE — PROGRESS NOTES
Subjective:       Patient ID: Nadia Damon is a 68 y.o. female.    Chief Complaint: Foot Pain (C/o foot pain, rate pain 6/10, nondiabetic, pt is wearing medical boot on left foot and tennis shoes on right)      HPI: Nadia Damon presents to the clinic today for follow up concerning pains at the plantar LLE 3rd toe. Has been in [facility] since her last evaluation about 2 weeks ago. States improved pains. Pains are a 6/10 at the area. States painful callus at the RLE 1st MTPJ. Patient's Primary Care Provider is Elis Wick MD.     Review of patient's allergies indicates:   Allergen Reactions    Lisinopril Swelling and Rash    Augmentin [amoxicillin-pot clavulanate] Diarrhea    Zyvox [linezolid] Nausea And Vomiting       Past Medical History:   Diagnosis Date    Abdominal wall hernia     CT Renal 6/11/2018---Small fat containing superior ventral abdominal wall hernia at the epicardial pacing lead site.    Abnormal mammogram 10/12/2021    GRACE (acute kidney injury) 11/22/2021    Anxiety     Arthritis     ZEN HIPS    Breast cancer in female 08/2021    LEFT BREAST    C. difficile colitis 11/29/2021    Cellulitis of axilla, left 12/23/2021    Chronic diastolic heart failure 12/16/2021    Chronic kidney disease     stage 4, GFR 15-29 ml/min    Chronic midline low back pain without sciatica 06/18/2018    Closed nondisplaced fracture of distal phalanx of left great toe with routine healing 10/22/2018    Coronary artery disease 1993    heart transplant    Cystitis 05/10/2022    Depression     Encounter for blood transfusion     Fibromyalgia     on Lyrica    Heart failure     native heart cardiomyopathy    Heart transplanted 1993    due to cardiomyopathy    History of hyperparathyroidism; Hyperparathyroidism, secondary renal     PT DENIES    Hypertension     Immune disorder     anti rejection meds    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    Impaired mobility 07/28/2022    Iron deficiency  anemia 08/15/2017    Kidney stones     passed per pt    Obesity     Other osteoporosis without current pathological fracture 2019    Parotitis, acute 2023    Pharyngitis 2019    Severe sepsis 2021    Shingles 2003 approx    left leg    Subclinical hypothyroidism 2023    Thrombocytopenia, unspecified 2021    Trouble in sleeping     Urinary incontinence        Family History   Problem Relation Age of Onset    Cancer Mother 38        breast    Breast cancer Mother     Breast cancer Maternal Grandmother     Heart disease Maternal Grandmother     Hypertension Son     Cataracts Cousin     Diabetes Neg Hx     Stroke Neg Hx     Kidney disease Neg Hx     Asthma Neg Hx     COPD Neg Hx     Melanoma Neg Hx     Hyperlipidemia Neg Hx        Social History     Socioeconomic History    Marital status: Single    Number of children: 2    Highest education level: 11th grade   Occupational History    Occupation: Retired   Tobacco Use    Smoking status: Never    Smokeless tobacco: Never   Substance and Sexual Activity    Alcohol use: Never     Alcohol/week: 0.0 standard drinks of alcohol    Drug use: No    Sexual activity: Not Currently     Partners: Male     Birth control/protection: See Surgical Hx   Other Topics Concern    Are you pregnant or think you may be? No    Breast-feeding No   Social History Narrative    Single. 2 children , 1  at 31 yoa  2014 strep throat -  pneumonia and renal complications after not completing course of AB. Other child lives in [place], [place]. Has a cousin locally that could help in an emergency. 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 Caffeine intake =- 1 cola a day. No coffee, + occasional tea, avoids caffeine especially at night. Still drives. She does not have a Living Will or Advanced directive.      Social Determinants of Health     Financial Resource Strain: Low Risk  (2023)    Overall Financial Resource Strain (CARDIA)      Difficulty of Paying Living Expenses: Not hard at all   Food Insecurity: No Food Insecurity (11/16/2023)    Hunger Vital Sign     Worried About Running Out of Food in the Last Year: Never true     Ran Out of Food in the Last Year: Never true   Transportation Needs: No Transportation Needs (11/16/2023)    PRAPARE - Transportation     Lack of Transportation (Medical): No     Lack of Transportation (Non-Medical): No   Physical Activity: Sufficiently Active (11/16/2023)    Exercise Vital Sign     Days of Exercise per Week: 3 days     Minutes of Exercise per Session: 60 min   Recent Concern: Physical Activity - Insufficiently Active (9/22/2023)    Exercise Vital Sign     Days of Exercise per Week: 2 days     Minutes of Exercise per Session: 60 min   Stress: No Stress Concern Present (11/16/2023)    [employer] - Occupational Stress Questionnaire     Feeling of Stress : Not at all   Social Connections: Moderately Isolated (11/16/2023)    Social Connection and Isolation Panel [NHANES]     Frequency of Communication with Friends and Family: More than three times a week     Frequency of Social Gatherings with Friends and Family: More than three times a week     Attends Christian Services: More than 4 times per year     Active Member of Clubs or Organizations: No     Attends Club or Organization Meetings: Never     Marital Status: Never    Housing Stability: Low Risk  (11/16/2023)    Housing Stability Vital Sign     Unable to Pay for Housing in the Last Year: No     Number of Places Lived in the Last Year: 1     Unstable Housing in the Last Year: No       Past Surgical History:   Procedure Laterality Date    BLADDER SURGERY  2015 approx    mesh - Dr Everett then 2nd reconstructive sx Dr Onofre    BREAST BIOPSY Bilateral     NEGATIVE    BREAST BIOPSY Right 10/31/2022    benign    BREAST LUMPECTOMY Left 2021    BREAST SURGERY Left 09/28/2015    Bx - benign    BREAST SURGERY Right 12/2015     Bx benign    CARDIAC PACEMAKER REMOVAL Left 06/26/2014    Pacer defirillator removed. Put in 1993 aat time of heart transplant    CARPAL TUNNEL RELEASE Left 03/03/2015    Dr. Hall    COLONOSCOPY N/A 02/25/2021    Procedure: COLONOSCOPY;  Surgeon: Freida Ramirez MD;  Location: [facility];  Service: Endoscopy;  Laterality: N/A;    CYSTOCELE REPAIR      Twice with mesh removal    EPIDURAL STEROID INJECTION INTO CERVICAL SPINE N/A 02/02/2023    Procedure: T11/T12 IL HELLEN;  Surgeon: Jassi Pierre MD;  Location: [facility];  Service: Pain Management;  Laterality: N/A;    HEART TRANSPLANT  1993    HERNIA REPAIR Right 1971 approx    Inguinal    HYSTERECTOMY  1983    vag hyst /LSO     INCISION AND DRAINAGE OF ABSCESS Left 12/24/2021    Procedure: INCISION AND DRAINAGE, ABSCESS;  Surgeon: Joseph Longo MD;  Location: [facility];  Service: General;  Laterality: Left;    INJECTION OF ANESTHETIC AGENT AROUND MEDIAL BRANCH NERVES INNERVATING LUMBAR FACET JOINT Right 10/19/2022    Procedure: Right L4/L5 and L5/S1 MBB;  Surgeon: Jassi Pierre MD;  Location: [facility];  Service: Pain Management;  Laterality: Right;    INJECTION OF ANESTHETIC AGENT AROUND MEDIAL BRANCH NERVES INNERVATING LUMBAR FACET JOINT Right 11/09/2022    Procedure: Right L4/L5 and L5/S1 MBB;  Surgeon: Jassi Pierre MD;  Location: [facility];  Service: Pain Management;  Laterality: Right;    INJECTION OF ANESTHETIC AGENT INTO SACROILIAC JOINT Right 08/22/2022    Procedure: Right SIJ Injection Right L5/S1 Facte Injection;  Surgeon: Jassi Pierre MD;  Location: [facility];  Service: Pain Management;  Laterality: Right;    INSERTION OF TUNNELED CENTRAL VENOUS CATHETER (CVC) WITH SUBCUTANEOUS PORT N/A 11/09/2021    Procedure: [id number];  Surgeon: Christoph Douglas MD;  Location: [facility];  Service: General;  Laterality: N/A;    RADIOFREQUENCY THERMOCOAGULATION Right 12/07/2022    Procedure: Right L4/L5 and L5/S1 Lumbar RFA;   "Surgeon: Jassi Pierre MD;  [facility];  Service: Pain Management;  Laterality: Right;    REMOVAL OF VASCULAR ACCESS PORT      ROBOT-ASSISTED LAPAROSCOPIC ABDOMINAL SACROCOLPOPEXY N/A 8/10/2023    Procedure: ROBOTIC SACROCOLPOPEXY, ABDOMEN;  Surgeon: PRANAY Villalobos MD;  Location: [facility];  Service: OB/GYN;  Laterality: N/A;    ROBOT-ASSISTED LAPAROSCOPIC OOPHORECTOMY Right 8/10/2023    Procedure: ROBOTIC OOPHORECTOMY;  Surgeon: PRANAY Villalobos MD;  Location: [facility];  Service: OB/GYN;  Laterality: Right;    SENTINEL LYMPH NODE BIOPSY Left 10/12/2021    Procedure: BIOPSY, LYMPH NODE, SENTINEL;  Surgeon: Christoph Douglas MD;  Location: [facility];  Service: General;  Laterality: Left;    TOE SURGERY      XI ROBOTIC URETHROPEXY N/A 8/10/2023    Procedure: XI ROBOTIC URETHROPEXY;  Surgeon: PRANAY Villalobos MD;  Location: [facility];  Service: OB/GYN;  Laterality: N/A;       Review of Systems   Constitutional:  Negative for chills, fatigue and fever.   HENT:  Negative for hearing loss.    Eyes:  Negative for photophobia and visual disturbance.   Respiratory:  Negative for cough, chest tightness, shortness of breath and wheezing.    Cardiovascular:  Negative for chest pain and palpitations.   Gastrointestinal:  Negative for constipation, diarrhea, nausea and vomiting.   Endocrine: Negative for cold intolerance and heat intolerance.   Genitourinary:  Negative for flank pain.   Musculoskeletal:  Positive for gait problem. Negative for neck pain and neck stiffness.   Neurological:  Negative for light-headedness and headaches.   Psychiatric/Behavioral:  Negative for sleep disturbance.          Objective:   Ht 5' 2" (1.575 m)   Wt 72.1 kg (159 lb)   LMP 06/20/1983 (Within Years)   BMI 29.08 kg/m²         LOWER EXTREMITY PHYSICAL EXAMINATION  DERMATOLOGY: Skin is supple, dry and intact. Palpable plate fixation, LLE, 1st MTPJ.    ORTHOPEDIC: Manual Muscle Testing is 5/5 in all planes on the LLE, without " pains, with and without resistance. Moderate pains with mild edema at the LLE 3rd toe at the plantar proximal phalanx and IPJ. Elevatus with medial deviation of the 2nd digit hammer toes. Fusion of the 1st MTPJ with the 1st digit plantar distal phalanx and IPJ off the WB surface. Gait is mildly.    Assessment:     1. Acquired deformity of toe, left    2. Contusion of lesser toe of left foot without damage to nail, sequela    3. Pain in left toe(s)    4. History of surgical fusion joint    5. Hammer toe of left foot            Plan:     Acquired deformity of toe, left    Contusion of lesser toe of left foot without damage to nail, sequela    Pain in left toe(s)    History of surgical fusion joint    Hammer toe of left foot        Thorough discussion is had with the patient today, concerning the diagnosis, its etiology, and the treatment algorithm at present.     XRAYS are reviewed in detail with the patient. All questions and concerns regarding findings and its/their implications are outlined and discussed.    May D/C [facility] at present.    Tylenol as needed.    Pains to the LLE plantar 3rd toe proximal phalanx and IPJ due to elevatus of the 2nd digit hammer toe and mild elevatus of the 1st digit, s/p fusion.    This will be a chronic issue.    Follow up as needed for debridement of RLE 1st MTPJ callus.    Silicone toe sleeves are dispensed for the 3rd toe.    For permanent cure, would need revision of 1st MTPJ fusion with repair of 2nd digit hammer toe with possible gastroc recession.    Did discuss proper and supportive shoe gear in detail and at length with the patient.  These are shoes with firm and robust arch support; medial counter.  Shoes which only bend at the metatarsophalangeal joint and which are rigid in the midfoot and hindfoot. Patient urged to purchase running type or cross training type shoes gear which are designed for pronation control.     Hypertrophic skin formation, as outlined within the  examination portion of this note, is surgically debrided with sharp #10/#15 blade, to alleviate discomfort with weight bearing and ambulation, and to lessen the possibility of skin complications, e.g., ulceration due to pressure. No ulceration(s) is are noted with/post debridement. The lesion is completed healed and resolved. No evidence of infection.     The procedure of (revision of 1st MTPJ fusion with repair of 2nd digit hammer toe with possible grastrocnemius recession) was thoroughly explained to the patient. Its necessity and/or purpose and the implications therein were outlined, including any pertinent advantages and/or disadvantages, and possible complications, if any. Possible complications include recurrence of pathology and/or deformity, infection (cellulitis, drainage, purulence, malodor, etc...), pain, numbness, neuritis, edema, burning, loss of function, need for further surgery, possible need for removal of any implanted hardware, soft tissue contracture and/or scarring, etc... No guarantees were given and/or implied. Post-operative expectations and weightbearing protocol is thoroughly explained to the patient, who acknowledges understanding.           Future Appointments   Date Time Provider Department Center   12/11/2023 12:00 PM [facility] SAME DAY LAB [facility] DS [facility]   12/11/2023  1:00 PM Kelsie Burk PA-C [facility] HEM ONC [facility]   12/11/2023  2:00 PM INJECTION 1, [facility] INFUSION [facility] INFSN [facility]   12/28/2023  9:00 AM Sri Gallagher NP [facility] UROLOGY [facility]   12/29/2023  3:40 PM Elis Wick MD [facility]   1/2/2024  3:00 PM Elis Wick MD [facility]   7/3/2024  1:10 PM LABORATORY, [facility] ON LAB [place]   7/3/2024  1:30 PM ON BMD1 ON DEXABMD [facility]   7/11/2024 12:30 PM Prasanth Johnson MD [facility] RHEU [facility]

## 2023-12-11 ENCOUNTER — INFUSION (OUTPATIENT)
Dept: INFUSION THERAPY | Facility: HOSPITAL | Age: 68
End: 2023-12-11
Attending: INTERNAL MEDICINE
Payer: MEDICARE

## 2023-12-11 ENCOUNTER — OFFICE VISIT (OUTPATIENT)
Dept: HEMATOLOGY/ONCOLOGY | Facility: CLINIC | Age: 68
End: 2023-12-11
Payer: MEDICARE

## 2023-12-11 VITALS
HEART RATE: 92 BPM | DIASTOLIC BLOOD PRESSURE: 80 MMHG | SYSTOLIC BLOOD PRESSURE: 140 MMHG | TEMPERATURE: 97 F | WEIGHT: 163.13 LBS | HEART RATE: 99 BPM | SYSTOLIC BLOOD PRESSURE: 129 MMHG | BODY MASS INDEX: 30.02 KG/M2 | TEMPERATURE: 98 F | RESPIRATION RATE: 16 BRPM | OXYGEN SATURATION: 100 % | DIASTOLIC BLOOD PRESSURE: 83 MMHG | OXYGEN SATURATION: 99 % | HEIGHT: 62 IN

## 2023-12-11 DIAGNOSIS — D63.1 ANEMIA ASSOCIATED WITH STAGE 4 CHRONIC RENAL FAILURE: ICD-10-CM

## 2023-12-11 DIAGNOSIS — D84.9 IMMUNOCOMPROMISED PATIENT: ICD-10-CM

## 2023-12-11 DIAGNOSIS — N18.4 CKD (CHRONIC KIDNEY DISEASE) STAGE 4, GFR 15-29 ML/MIN: Primary | Chronic | ICD-10-CM

## 2023-12-11 DIAGNOSIS — M81.8 OTHER OSTEOPOROSIS WITHOUT CURRENT PATHOLOGICAL FRACTURE: Primary | ICD-10-CM

## 2023-12-11 DIAGNOSIS — D63.1 ANEMIA ASSOCIATED WITH STAGE 4 CHRONIC RENAL FAILURE: Chronic | ICD-10-CM

## 2023-12-11 DIAGNOSIS — D05.12 DUCTAL CARCINOMA IN SITU (DCIS) OF LEFT BREAST: ICD-10-CM

## 2023-12-11 DIAGNOSIS — N18.4 ANEMIA ASSOCIATED WITH STAGE 4 CHRONIC RENAL FAILURE: Chronic | ICD-10-CM

## 2023-12-11 DIAGNOSIS — N18.4 ANEMIA ASSOCIATED WITH STAGE 4 CHRONIC RENAL FAILURE: ICD-10-CM

## 2023-12-11 PROCEDURE — 3044F PR MOST RECENT HEMOGLOBIN A1C LEVEL <7.0%: ICD-10-PCS | Mod: HCNC,CPTII,S$GLB,

## 2023-12-11 PROCEDURE — 3079F DIAST BP 80-89 MM HG: CPT | Mod: HCNC,CPTII,S$GLB,

## 2023-12-11 PROCEDURE — 3008F BODY MASS INDEX DOCD: CPT | Mod: HCNC,CPTII,S$GLB,

## 2023-12-11 PROCEDURE — 63600175 PHARM REV CODE 636 W HCPCS: Mod: JZ,EC,JG,HCNC

## 2023-12-11 PROCEDURE — 1125F PR PAIN SEVERITY QUANTIFIED, PAIN PRESENT: ICD-10-PCS | Mod: HCNC,CPTII,S$GLB,

## 2023-12-11 PROCEDURE — 99214 OFFICE O/P EST MOD 30 MIN: CPT | Mod: HCNC,S$GLB,,

## 2023-12-11 PROCEDURE — 1125F AMNT PAIN NOTED PAIN PRSNT: CPT | Mod: HCNC,CPTII,S$GLB,

## 2023-12-11 PROCEDURE — 3288F PR FALLS RISK ASSESSMENT DOCUMENTED: ICD-10-PCS | Mod: HCNC,CPTII,S$GLB,

## 2023-12-11 PROCEDURE — 1157F PR ADVANCE CARE PLAN OR EQUIV PRESENT IN MEDICAL RECORD: ICD-10-PCS | Mod: HCNC,CPTII,S$GLB,

## 2023-12-11 PROCEDURE — 3079F PR MOST RECENT DIASTOLIC BLOOD PRESSURE 80-89 MM HG: ICD-10-PCS | Mod: HCNC,CPTII,S$GLB,

## 2023-12-11 PROCEDURE — 96372 THER/PROPH/DIAG INJ SC/IM: CPT | Mod: HCNC

## 2023-12-11 PROCEDURE — 99999 PR PBB SHADOW E&M-EST. PATIENT-LVL IV: ICD-10-PCS | Mod: PBBFAC,HCNC,,

## 2023-12-11 PROCEDURE — 1101F PT FALLS ASSESS-DOCD LE1/YR: CPT | Mod: HCNC,CPTII,S$GLB,

## 2023-12-11 PROCEDURE — 3074F PR MOST RECENT SYSTOLIC BLOOD PRESSURE < 130 MM HG: ICD-10-PCS | Mod: HCNC,CPTII,S$GLB,

## 2023-12-11 PROCEDURE — 3066F NEPHROPATHY DOC TX: CPT | Mod: HCNC,CPTII,S$GLB,

## 2023-12-11 PROCEDURE — 3008F PR BODY MASS INDEX (BMI) DOCUMENTED: ICD-10-PCS | Mod: HCNC,CPTII,S$GLB,

## 2023-12-11 PROCEDURE — 3044F HG A1C LEVEL LT 7.0%: CPT | Mod: HCNC,CPTII,S$GLB,

## 2023-12-11 PROCEDURE — 99214 PR OFFICE/OUTPT VISIT, EST, LEVL IV, 30-39 MIN: ICD-10-PCS | Mod: HCNC,S$GLB,,

## 2023-12-11 PROCEDURE — 1101F PR PT FALLS ASSESS DOC 0-1 FALLS W/OUT INJ PAST YR: ICD-10-PCS | Mod: HCNC,CPTII,S$GLB,

## 2023-12-11 PROCEDURE — 3074F SYST BP LT 130 MM HG: CPT | Mod: HCNC,CPTII,S$GLB,

## 2023-12-11 PROCEDURE — 1159F MED LIST DOCD IN RCRD: CPT | Mod: HCNC,CPTII,S$GLB,

## 2023-12-11 PROCEDURE — 3066F PR DOCUMENTATION OF TREATMENT FOR NEPHROPATHY: ICD-10-PCS | Mod: HCNC,CPTII,S$GLB,

## 2023-12-11 PROCEDURE — 99999 PR PBB SHADOW E&M-EST. PATIENT-LVL IV: CPT | Mod: PBBFAC,HCNC,,

## 2023-12-11 PROCEDURE — 3288F FALL RISK ASSESSMENT DOCD: CPT | Mod: HCNC,CPTII,S$GLB,

## 2023-12-11 PROCEDURE — 1157F ADVNC CARE PLAN IN RCRD: CPT | Mod: HCNC,CPTII,S$GLB,

## 2023-12-11 PROCEDURE — 1159F PR MEDICATION LIST DOCUMENTED IN MEDICAL RECORD: ICD-10-PCS | Mod: HCNC,CPTII,S$GLB,

## 2023-12-11 RX ORDER — HYDROCORTISONE 1 %
CREAM (GRAM) TOPICAL 2 TIMES DAILY
Qty: 28 G | Refills: 0 | Status: SHIPPED | OUTPATIENT
Start: 2023-12-11 | End: 2024-02-26

## 2023-12-11 RX ORDER — HYDROCORTISONE 1 %
CREAM (GRAM) TOPICAL 2 TIMES DAILY
Qty: 112 G | Refills: 0 | Status: SHIPPED | OUTPATIENT
Start: 2023-12-11 | End: 2023-12-11 | Stop reason: CLARIF

## 2023-12-11 RX ADMIN — EPOETIN ALFA-EPBX 20000 UNITS: 10000 INJECTION, SOLUTION INTRAVENOUS; SUBCUTANEOUS at 01:12

## 2023-12-14 ENCOUNTER — TELEPHONE (OUTPATIENT)
Dept: PRIMARY CARE CLINIC | Facility: CLINIC | Age: 68
End: 2023-12-14
Payer: MEDICARE

## 2023-12-18 DIAGNOSIS — Z94.1 HEART TRANSPLANTED: ICD-10-CM

## 2023-12-18 DIAGNOSIS — I10 ESSENTIAL HYPERTENSION: ICD-10-CM

## 2023-12-19 RX ORDER — FUROSEMIDE 20 MG/1
20 TABLET ORAL
Qty: 90 TABLET | Refills: 3 | Status: SHIPPED | OUTPATIENT
Start: 2023-12-19

## 2023-12-22 ENCOUNTER — TELEPHONE (OUTPATIENT)
Dept: TRANSPLANT | Facility: CLINIC | Age: 68
End: 2023-12-22
Payer: MEDICARE

## 2023-12-26 ENCOUNTER — OFFICE VISIT (OUTPATIENT)
Dept: PRIMARY CARE CLINIC | Facility: CLINIC | Age: 68
End: 2023-12-26
Payer: MEDICARE

## 2023-12-26 ENCOUNTER — HOSPITAL ENCOUNTER (OUTPATIENT)
Dept: RADIOLOGY | Facility: HOSPITAL | Age: 68
Discharge: HOME | End: 2023-12-26
Attending: NURSE PRACTITIONER
Payer: MEDICARE

## 2023-12-26 VITALS
DIASTOLIC BLOOD PRESSURE: 70 MMHG | WEIGHT: 160.69 LBS | BODY MASS INDEX: 29.57 KG/M2 | OXYGEN SATURATION: 99 % | HEIGHT: 62 IN | HEART RATE: 89 BPM | SYSTOLIC BLOOD PRESSURE: 136 MMHG | TEMPERATURE: 98 F

## 2023-12-26 DIAGNOSIS — E87.5 HYPERKALEMIA: ICD-10-CM

## 2023-12-26 DIAGNOSIS — R11.0 NAUSEA: ICD-10-CM

## 2023-12-26 DIAGNOSIS — N18.4 CKD (CHRONIC KIDNEY DISEASE) STAGE 4, GFR 15-29 ML/MIN: Chronic | ICD-10-CM

## 2023-12-26 DIAGNOSIS — J40 BRONCHITIS: ICD-10-CM

## 2023-12-26 DIAGNOSIS — R07.9 CHEST PAIN, UNSPECIFIED TYPE: ICD-10-CM

## 2023-12-26 DIAGNOSIS — D84.9 IMMUNOCOMPROMISED PATIENT: ICD-10-CM

## 2023-12-26 DIAGNOSIS — R05.1 ACUTE COUGH: Primary | ICD-10-CM

## 2023-12-26 DIAGNOSIS — J01.10 ACUTE FRONTAL SINUSITIS, RECURRENCE NOT SPECIFIED: ICD-10-CM

## 2023-12-26 DIAGNOSIS — I50.32 CHRONIC DIASTOLIC (CONGESTIVE) HEART FAILURE: ICD-10-CM

## 2023-12-26 PROBLEM — N17.9 AKI (ACUTE KIDNEY INJURY): Status: RESOLVED | Noted: 2021-11-22 | Resolved: 2023-12-26

## 2023-12-26 LAB
CTP QC/QA: YES
CTP QC/QA: YES
POC MOLECULAR INFLUENZA A AGN: NEGATIVE
POC MOLECULAR INFLUENZA B AGN: NEGATIVE
SARS-COV-2 AG RESP QL IA.RAPID: NEGATIVE

## 2023-12-26 PROCEDURE — 3066F PR DOCUMENTATION OF TREATMENT FOR NEPHROPATHY: ICD-10-PCS | Mod: HCNC,CPTII,S$GLB, | Performed by: NURSE PRACTITIONER

## 2023-12-26 PROCEDURE — 1159F MED LIST DOCD IN RCRD: CPT | Mod: HCNC,CPTII,S$GLB, | Performed by: NURSE PRACTITIONER

## 2023-12-26 PROCEDURE — 87502 POCT INFLUENZA A/B MOLECULAR: ICD-10-PCS | Mod: QW,HCNC,S$GLB, | Performed by: NURSE PRACTITIONER

## 2023-12-26 PROCEDURE — 71046 X-RAY EXAM CHEST 2 VIEWS: CPT | Mod: 26,HCNC,, | Performed by: RADIOLOGY

## 2023-12-26 PROCEDURE — 93010 EKG 12-LEAD: ICD-10-PCS | Mod: HCNC,S$GLB,, | Performed by: INTERNAL MEDICINE

## 2023-12-26 PROCEDURE — 71046 X-RAY EXAM CHEST 2 VIEWS: CPT | Mod: TC,HCNC

## 2023-12-26 PROCEDURE — 1101F PT FALLS ASSESS-DOCD LE1/YR: CPT | Mod: HCNC,CPTII,S$GLB, | Performed by: NURSE PRACTITIONER

## 2023-12-26 PROCEDURE — 99215 PR OFFICE/OUTPT VISIT, EST, LEVL V, 40-54 MIN: ICD-10-PCS | Mod: HCNC,S$GLB,, | Performed by: NURSE PRACTITIONER

## 2023-12-26 PROCEDURE — 93005 EKG 12-LEAD: ICD-10-PCS | Mod: HCNC,S$GLB,, | Performed by: NURSE PRACTITIONER

## 2023-12-26 PROCEDURE — 3078F DIAST BP <80 MM HG: CPT | Mod: HCNC,CPTII,S$GLB, | Performed by: NURSE PRACTITIONER

## 2023-12-26 PROCEDURE — 3078F PR MOST RECENT DIASTOLIC BLOOD PRESSURE < 80 MM HG: ICD-10-PCS | Mod: HCNC,CPTII,S$GLB, | Performed by: NURSE PRACTITIONER

## 2023-12-26 PROCEDURE — 87502 INFLUENZA DNA AMP PROBE: CPT | Mod: QW,HCNC,S$GLB, | Performed by: NURSE PRACTITIONER

## 2023-12-26 PROCEDURE — 87811 SARS CORONAVIRUS 2 ANTIGEN POCT, MANUAL READ: ICD-10-PCS | Mod: QW,HCNC,S$GLB, | Performed by: NURSE PRACTITIONER

## 2023-12-26 PROCEDURE — 3008F PR BODY MASS INDEX (BMI) DOCUMENTED: ICD-10-PCS | Mod: HCNC,CPTII,S$GLB, | Performed by: NURSE PRACTITIONER

## 2023-12-26 PROCEDURE — 3044F PR MOST RECENT HEMOGLOBIN A1C LEVEL <7.0%: ICD-10-PCS | Mod: HCNC,CPTII,S$GLB, | Performed by: NURSE PRACTITIONER

## 2023-12-26 PROCEDURE — 3075F PR MOST RECENT SYSTOLIC BLOOD PRESS GE 130-139MM HG: ICD-10-PCS | Mod: HCNC,CPTII,S$GLB, | Performed by: NURSE PRACTITIONER

## 2023-12-26 PROCEDURE — 1159F PR MEDICATION LIST DOCUMENTED IN MEDICAL RECORD: ICD-10-PCS | Mod: HCNC,CPTII,S$GLB, | Performed by: NURSE PRACTITIONER

## 2023-12-26 PROCEDURE — 94640 PR INHAL RX, AIRWAY OBST/DX SPUTUM INDUCT: ICD-10-PCS | Mod: 59,HCNC,S$GLB, | Performed by: NURSE PRACTITIONER

## 2023-12-26 PROCEDURE — 99999 PR PBB SHADOW E&M-EST. PATIENT-LVL V: CPT | Mod: PBBFAC,HCNC,, | Performed by: NURSE PRACTITIONER

## 2023-12-26 PROCEDURE — 3008F BODY MASS INDEX DOCD: CPT | Mod: HCNC,CPTII,S$GLB, | Performed by: NURSE PRACTITIONER

## 2023-12-26 PROCEDURE — 3044F HG A1C LEVEL LT 7.0%: CPT | Mod: HCNC,CPTII,S$GLB, | Performed by: NURSE PRACTITIONER

## 2023-12-26 PROCEDURE — 93010 ELECTROCARDIOGRAM REPORT: CPT | Mod: HCNC,S$GLB,, | Performed by: INTERNAL MEDICINE

## 2023-12-26 PROCEDURE — 3075F SYST BP GE 130 - 139MM HG: CPT | Mod: HCNC,CPTII,S$GLB, | Performed by: NURSE PRACTITIONER

## 2023-12-26 PROCEDURE — 1101F PR PT FALLS ASSESS DOC 0-1 FALLS W/OUT INJ PAST YR: ICD-10-PCS | Mod: HCNC,CPTII,S$GLB, | Performed by: NURSE PRACTITIONER

## 2023-12-26 PROCEDURE — 1157F PR ADVANCE CARE PLAN OR EQUIV PRESENT IN MEDICAL RECORD: ICD-10-PCS | Mod: HCNC,CPTII,S$GLB, | Performed by: NURSE PRACTITIONER

## 2023-12-26 PROCEDURE — 87811 SARS-COV-2 COVID19 W/OPTIC: CPT | Mod: QW,HCNC,S$GLB, | Performed by: NURSE PRACTITIONER

## 2023-12-26 PROCEDURE — 93005 ELECTROCARDIOGRAM TRACING: CPT | Mod: HCNC,S$GLB,, | Performed by: NURSE PRACTITIONER

## 2023-12-26 PROCEDURE — 1157F ADVNC CARE PLAN IN RCRD: CPT | Mod: HCNC,CPTII,S$GLB, | Performed by: NURSE PRACTITIONER

## 2023-12-26 PROCEDURE — 3288F PR FALLS RISK ASSESSMENT DOCUMENTED: ICD-10-PCS | Mod: HCNC,CPTII,S$GLB, | Performed by: NURSE PRACTITIONER

## 2023-12-26 PROCEDURE — 99999 PR PBB SHADOW E&M-EST. PATIENT-LVL V: ICD-10-PCS | Mod: PBBFAC,HCNC,, | Performed by: NURSE PRACTITIONER

## 2023-12-26 PROCEDURE — 99215 OFFICE O/P EST HI 40 MIN: CPT | Mod: HCNC,S$GLB,, | Performed by: NURSE PRACTITIONER

## 2023-12-26 PROCEDURE — 3066F NEPHROPATHY DOC TX: CPT | Mod: HCNC,CPTII,S$GLB, | Performed by: NURSE PRACTITIONER

## 2023-12-26 PROCEDURE — 3288F FALL RISK ASSESSMENT DOCD: CPT | Mod: HCNC,CPTII,S$GLB, | Performed by: NURSE PRACTITIONER

## 2023-12-26 PROCEDURE — 94640 AIRWAY INHALATION TREATMENT: CPT | Mod: 59,HCNC,S$GLB, | Performed by: NURSE PRACTITIONER

## 2023-12-26 PROCEDURE — 71046 XR CHEST PA AND LATERAL: ICD-10-PCS | Mod: 26,HCNC,, | Performed by: RADIOLOGY

## 2023-12-26 RX ORDER — ALBUTEROL SULFATE 90 UG/1
2 AEROSOL, METERED RESPIRATORY (INHALATION) EVERY 6 HOURS PRN
Qty: 18 G | Refills: 1 | Status: SHIPPED | OUTPATIENT
Start: 2023-12-26 | End: 2023-12-26 | Stop reason: SDUPTHER

## 2023-12-26 RX ORDER — ONDANSETRON 4 MG/1
4 TABLET, ORALLY DISINTEGRATING ORAL ONCE
Qty: 1 TABLET | Refills: 0 | Status: SHIPPED | OUTPATIENT
Start: 2023-12-26 | End: 2023-12-27

## 2023-12-26 RX ORDER — DOXYCYCLINE 100 MG/1
100 CAPSULE ORAL EVERY 12 HOURS
Qty: 14 CAPSULE | Refills: 0 | Status: SHIPPED | OUTPATIENT
Start: 2023-12-26 | End: 2023-12-26 | Stop reason: SDUPTHER

## 2023-12-26 RX ORDER — ALBUTEROL SULFATE 0.83 MG/ML
2.5 SOLUTION RESPIRATORY (INHALATION)
Status: COMPLETED | OUTPATIENT
Start: 2023-12-26 | End: 2023-12-26

## 2023-12-26 RX ORDER — DOXYCYCLINE 100 MG/1
100 CAPSULE ORAL EVERY 12 HOURS
Qty: 14 CAPSULE | Refills: 0 | Status: SHIPPED | OUTPATIENT
Start: 2023-12-26 | End: 2024-01-02

## 2023-12-26 RX ORDER — ALBUTEROL SULFATE 90 UG/1
2 AEROSOL, METERED RESPIRATORY (INHALATION) EVERY 6 HOURS PRN
Qty: 18 G | Refills: 1 | Status: SHIPPED | OUTPATIENT
Start: 2023-12-26 | End: 2024-02-24

## 2023-12-26 RX ADMIN — ALBUTEROL SULFATE 2.5 MG: 0.83 SOLUTION RESPIRATORY (INHALATION) at 02:12

## 2023-12-26 NOTE — ASSESSMENT & PLAN NOTE
BNP, CMP, CXR today.   Weight fluctuates but essentially stable.   Normal BP today.  Carvedilol, hydralazine, furosemide.

## 2023-12-26 NOTE — PATIENT INSTRUCTIONS
If you are feeling unwell, we'd like to be the first ones to know [facility]! Please give us a call. Same day appointments are our top priority to keep you well and out of the emergency rooms and hospitals. Call [phone] for our direct line. After hours advice is always available. Please call [phone] after hours to speak to the on-call team.        Over the counter robitussin DM as needed.     Use albuterol for shortness of breath, chest tightness with breathing. If you have chest tightness that is NOT worse with or associated with breathing and is not reproducible consider calling EMS.    Take doxycycline for sinus infection.

## 2023-12-26 NOTE — PROGRESS NOTES
Nadia Damon  12/26/2023  [id number]    Elis Wick MD  Patient Care Team:  Elis Wick MD as PCP - General (Internal Medicine)  Miguel Soni Jr., MD as Consulting Physician (Vascular Surgery)  Ike King MD as Consulting Physician (Cardiology)  Courtney Tubbs MD as Consulting Physician (Cardiology)  Carter Crawford MD as Consulting Physician (Nephrology)  Paxton Vasques OD as Consulting Physician (Optometry)  PRANAY Villalobos MD as Obstetrician (Obstetrics)  Parker Mccarthy IV, MD (Urology)  Ike King MD as Consulting Physician (Cardiology)  Jose Roland MD as Consulting Physician (Dermatology)  Prasanth Johnson MD as Consulting Physician (Rheumatology)  Nora Morin LCSW as   Elis Wick MD as Physician (Internal Medicine)  Lexi Bianchi LCSW as  (Hematology and Oncology)  Candace Saleh LMSW as  (Hematology and Oncology)      [facility] Note      Chief Complaint:  Chief Complaint   Patient presents with    Cough     Pt is complaining of a cough, sinus headache and congestion.        History of Present Illness:  HPI    Hoarseness, sinus pressure/pain. No post nasal drip or rhinorrhea.   Onset about one week ago. Sinus pressure is been getting worse.   Mild cough, yellow thick sputum at times.    Feels chest tightness with normal breathing.   Unclear if she's having chest congestion.   Chills. Increased fatigue doing activities she normally does.       Review of Systems   Constitutional:  Positive for activity change, chills and fatigue.   Respiratory:  Positive for cough, chest tightness and shortness of breath.    Cardiovascular:  Positive for leg swelling.         The following were reviewed: Active problem list, medication list, allergies, family history, social history, and Health Maintenance.       Medications:  Current Outpatient Medications on File Prior to Visit   Medication Sig Dispense  Refill    biotin 10,000 mcg Cap Take 1 tablet by mouth once daily.      cycloSPORINE modified, NEORAL, (NEORAL) 25 MG capsule Take 3 capsules (75 mg total) by mouth 2 (two) times daily. 540 capsule 1    ergocalciferol (VITAMIN D2) 50,000 unit Cap Take 50,000 Units by mouth every 7 days.      EVENING PRIMROSE OIL ORAL Take 1,000 mg by mouth once daily.      furosemide (LASIX) 20 MG tablet TAKE 1 TABLET EVERY DAY 90 tablet 3    guaiFENesin (MUCINEX) 600 mg 12 hr tablet Take 1,200 mg by mouth as needed.      hydrALAZINE (APRESOLINE) 50 MG tablet Take 1 tablet (50 mg total) by mouth every 8 (eight) hours. 270 tablet 3    hydrocortisone 1 % cream Apply topically 2 (two) times daily. 28 g 0    multivitamin capsule Take 1 capsule by mouth once daily.      NIFEdipine (PROCARDIA-XL) 30 MG (OSM) 24 hr tablet Take 1 tablet (30 mg total) by mouth once daily. 30 tablet 11    pantoprazole (PROTONIX) 20 MG tablet Take 1 tablet (20 mg total) by mouth once daily. 30 tablet 11    patiromer calcium sorbitex (VELTASSA) 8.4 gram PwPk Take 1 packet (8.4 g total) by mouth once daily. for 90 doses 30 packet 2    patiromer calcium sorbitex (VELTASSA) 8.4 gram PwPk Take 1 packet (8.4 g total) by mouth every other day. 15 packet 11    polyethylene glycol (GLYCOLAX) 17 gram/dose powder Take 17 g by mouth once daily.      pregabalin (LYRICA) 50 MG capsule Take 1 capsule (50 mg total) by mouth 2 (two) times daily. 180 capsule 1    psyllium husk (METAMUCIL ORAL) Take 17 g by mouth once daily.      RETACRIT 20,000 unit/mL injection       solifenacin (VESICARE) 5 MG tablet Take 1 tablet (5 mg total) by mouth once daily. 30 tablet 11    temazepam (RESTORIL) 30 mg capsule Take 1 capsule (30 mg total) by mouth nightly as needed for Insomnia. 30 capsule 5    UNABLE TO FIND medication name: Stool softener (Ducolax) Laxative      vitamin E 400 UNIT capsule Take 400 Units by mouth once daily.      zolpidem (AMBIEN) 10 mg Tab Take 1 tablet (10 mg total) by  mouth nightly as needed (insomnia). 90 tablet 0    calcitonin, salmon, (FORTICAL) 200 unit/actuation nasal spray 1 spray by Nasal route once daily. 3 each 3    carvediloL (COREG) 6.25 MG tablet Take 1 tablet (6.25 mg total) by mouth 2 (two) times daily with meals. 180 tablet 3     Current Facility-Administered Medications on File Prior to Visit   Medication Dose Route Frequency Provider Last Rate Last Admin    acetaminophen tablet 1,000 mg  1,000 mg Oral On Call Procedure PRANAY Villalobos MD        famotidine tablet 20 mg  20 mg Oral On Call Procedure PRANAY Villalobos MD           Medications have been reviewed and reconciled with patient at visit today.    Barriers to medications present (no )    Fall since last office visit (no )      Exam:  Vitals:    12/26/23 1336   BP: 136/70   Pulse: 89   Temp: 98.4 °F (36.9 °C)     Weight: 72.9 kg (160 lb 11.5 oz)   Body mass index is 29.4 kg/m².      BP Readings from Last 3 Encounters:   12/26/23 136/70   12/11/23 (!) 140/83   12/11/23 129/80     Wt Readings from Last 3 Encounters:   12/26/23 1336 72.9 kg (160 lb 11.5 oz)   12/11/23 1322 74 kg (163 lb 2.3 oz)   12/07/23 1338 72.1 kg (159 lb)            Physical Exam  Constitutional:       General: She is not in acute distress.     Appearance: She is ill-appearing.   HENT:      Right Ear: Tympanic membrane normal.      Left Ear: Tympanic membrane normal.      Nose: Nose normal.      Mouth/Throat:      Mouth: Mucous membranes are moist.      Pharynx: Oropharyngeal exudate present. No posterior oropharyngeal erythema.   Eyes:      General: No scleral icterus.  Cardiovascular:      Rate and Rhythm: Normal rate and regular rhythm.   Pulmonary:      Breath sounds: Wheezing present. No rhonchi or rales.   Chest:      Chest wall: No tenderness.   Abdominal:      General: Bowel sounds are normal.      Palpations: Abdomen is soft.      Tenderness: There is no abdominal tenderness.   Musculoskeletal:         General: Swelling  (BLE) present.      Cervical back: Neck supple.   Lymphadenopathy:      Cervical: No cervical adenopathy.   Skin:     General: Skin is warm and dry.      Findings: No rash.   Neurological:      Mental Status: She is alert. Mental status is at baseline.   Psychiatric:         Mood and Affect: Mood normal.         Behavior: Behavior normal.         Thought Content: Thought content normal.         Laboratory Reviewed: (Yes)  Lab Results   Component Value Date    WBC 3.89 (L) 12/11/2023    HGB 9.8 (L) 12/11/2023    HCT 30.8 (L) 12/11/2023     12/11/2023    CHOL 156 09/19/2023    TRIG 176 (H) 09/19/2023    HDL 34 (L) 09/19/2023    ALT 25 12/11/2023    AST 33 12/11/2023     (H) 12/11/2023    K 5.3 (H) 12/11/2023     (H) 12/11/2023    CREATININE 2.5 (H) 12/11/2023    BUN 46 (H) 12/11/2023    CO2 23 12/11/2023    TSH 7.542 (H) 11/14/2023    PSA <0.1 05/27/2008    INR 1.0 11/22/2021    HGBA1C 5.1 03/08/2023           Health Maintenance  Health Maintenance Topics with due status: Not Due       Topic Last Completion Date    TETANUS VACCINE 09/09/2020    Colorectal Cancer Screening 02/25/2021    DEXA Scan 01/25/2023    Hemoglobin A1c (Diabetic Prevention Screening) 03/08/2023    Mammogram 04/10/2023    Lipid Panel 09/19/2023     Health Maintenance Due   Topic Date Due    RSV Vaccine (Age 60+ and Pregnant patients) (1 - 1-dose 60+ series) Never done    COVID-19 Vaccine (4 - 2023-24 season) 09/01/2023    Pneumococcal Vaccines (Age 65+) (3 - PPSV23 or PCV20) 10/22/2023       Summary         Left Ventricle: The left ventricle is normal in size. Normal wall thickness. There is mild concentric hypertrophy. Normal wall motion. There is normal systolic function with a visually estimated ejection fraction of 55 - 70%. Ejection fraction by visual approximation is 65%. There is normal diastolic function.    Left Atrium: Left atrium is severely dilated.    Right Ventricle: Mild right ventricular enlargement. Wall  thickness is normal. Right ventricle wall motion  is normal. Systolic function is borderline low.    Tricuspid Valve: There is mild to moderate regurgitation. There is mild pulmonary hypertension.    IVC/SVC: Normal venous pressure at 3 mmHg.          Assessment:  Problem List Items Addressed This Visit          ENT    Acute frontal sinusitis     COVID and Flu negative.   Rx doxycycline.   OTC Tussin DM PRN         Relevant Medications    doxycycline (VIBRAMYCIN) 100 MG Cap       Pulmonary    Bronchitis     Albuterol, Tussin OTC.   CXR today.          Relevant Medications    albuterol nebulizer solution 2.5 mg (Completed)    albuterol (VENTOLIN HFA) 90 mcg/actuation inhaler       Cardiac/Vascular    Chronic diastolic (congestive) heart failure     BNP, CMP, CXR today.   Weight fluctuates but essentially stable.   Normal BP today.  Carvedilol, hydralazine, furosemide.         Relevant Orders    B-TYPE NATRIURETIC PEPTIDE       Renal/    CKD (chronic kidney disease) stage 4, GFR 15-29 ml/min (Chronic)     Repeat CMP today         Hyperkalemia     CMP today.            Immunology/Multi System    Immunocompromised patient     Rx doxycycline for sinusitis, CXR today.          Other Visit Diagnoses       Acute cough    -  Primary    Relevant Orders    SARS Coronavirus 2 Antigen, POCT Manual Read (Completed)    POCT Influenza A/B Molecular (Completed)    X-Ray Chest PA And Lateral    Chest pain, unspecified type        Relevant Orders    IN OFFICE EKG 12-LEAD (to Muse)    X-Ray Chest PA And Lateral    Comprehensive Metabolic Panel    CBC Auto Differential    Nausea        Relevant Medications    ondansetron (ZOFRAN-ODT) 4 MG TbDL              Plan:  Acute cough  -     SARS Coronavirus 2 Antigen, POCT Manual Read  -     POCT Influenza A/B Molecular  -     X-Ray Chest PA And Lateral    Chest pain, unspecified type  -     IN OFFICE EKG 12-LEAD (to Muse)  -     X-Ray Chest PA And Lateral  -     Comprehensive Metabolic Panel;  Future; Expected date: 12/26/2023  -     CBC Auto Differential; Future; Expected date: 12/26/2023    Bronchitis  -     albuterol nebulizer solution 2.5 mg  -     Discontinue: albuterol (VENTOLIN HFA) 90 mcg/actuation inhaler; Inhale 2 puffs into the lungs every 6 (six) hours as needed for Wheezing or Shortness of Breath. Rescue  Dispense: 18 g; Refill: 1  -     albuterol (VENTOLIN HFA) 90 mcg/actuation inhaler; Inhale 2 puffs into the lungs every 6 (six) hours as needed for Wheezing or Shortness of Breath. Rescue  Dispense: 18 g; Refill: 1    Acute frontal sinusitis, recurrence not specified  -     Discontinue: doxycycline (VIBRAMYCIN) 100 MG Cap; Take 1 capsule (100 mg total) by mouth every 12 (twelve) hours. for 7 days  Dispense: 14 capsule; Refill: 0  -     doxycycline (VIBRAMYCIN) 100 MG Cap; Take 1 capsule (100 mg total) by mouth every 12 (twelve) hours. for 7 days  Dispense: 14 capsule; Refill: 0    Chronic diastolic (congestive) heart failure  -     B-TYPE NATRIURETIC PEPTIDE; Future; Expected date: 12/26/2023    CKD (chronic kidney disease) stage 4, GFR 15-29 ml/min    Immunocompromised patient    Nausea  -     ondansetron (ZOFRAN-ODT) 4 MG TbDL; Take 1 tablet (4 mg total) by mouth once. for 1 dose  Dispense: 1 tablet; Refill: 0    Hyperkalemia      -Patient's lab results were reviewed and discussed with patient  -Treatment options and alternatives were discussed with the patient. Patient expressed understanding. Patient was given the opportunity to ask questions and be an active participant in their medical care. Patient had no further questions or concerns at this time.   -Documentation of patient's health and condition was obtained from family member who was present during visit.  -Patient is an overall moderate risk for health complications from their medical conditions.       Follow up: Follow up in about 1 week (around 1/2/2024).      After visit summary printed and given to patient upon  discharge.  Patient goals and care plan are included in After visit summary.    Total medical decision making time was 49 min.  The following issues were discussed: The primary encounter diagnosis was Acute cough. Diagnoses of Chest pain, unspecified type, Bronchitis, Acute frontal sinusitis, recurrence not specified, Chronic diastolic (congestive) heart failure, CKD (chronic kidney disease) stage 4, GFR 15-29 ml/min, Immunocompromised patient, Nausea, and Hyperkalemia were also pertinent to this visit.    Health maintenance needs, recent test results and goals of care discussed with pt and questions answered.

## 2023-12-27 ENCOUNTER — TELEPHONE (OUTPATIENT)
Dept: PRIMARY CARE CLINIC | Facility: CLINIC | Age: 68
End: 2023-12-27
Payer: MEDICARE

## 2023-12-27 ENCOUNTER — LAB VISIT (OUTPATIENT)
Dept: LAB | Facility: HOSPITAL | Age: 68
End: 2023-12-27
Payer: MEDICARE

## 2023-12-27 DIAGNOSIS — N18.4 ANEMIA ASSOCIATED WITH STAGE 4 CHRONIC RENAL FAILURE: Chronic | ICD-10-CM

## 2023-12-27 DIAGNOSIS — N18.4 CKD (CHRONIC KIDNEY DISEASE) STAGE 4, GFR 15-29 ML/MIN: Chronic | ICD-10-CM

## 2023-12-27 DIAGNOSIS — R07.9 CHEST PAIN, UNSPECIFIED TYPE: ICD-10-CM

## 2023-12-27 DIAGNOSIS — D63.1 ANEMIA ASSOCIATED WITH STAGE 4 CHRONIC RENAL FAILURE: Chronic | ICD-10-CM

## 2023-12-27 DIAGNOSIS — I50.32 CHRONIC DIASTOLIC (CONGESTIVE) HEART FAILURE: ICD-10-CM

## 2023-12-27 LAB
ALBUMIN SERPL BCP-MCNC: 3.5 G/DL (ref 3.5–5.2)
ALP SERPL-CCNC: 106 U/L (ref 55–135)
ALT SERPL W/O P-5'-P-CCNC: 19 U/L (ref 10–44)
ANION GAP SERPL CALC-SCNC: 12 MMOL/L (ref 8–16)
AST SERPL-CCNC: 32 U/L (ref 10–40)
BASOPHILS # BLD AUTO: 0.03 K/UL (ref 0–0.2)
BASOPHILS # BLD AUTO: 0.03 K/UL (ref 0–0.2)
BASOPHILS NFR BLD: 0.9 % (ref 0–1.9)
BASOPHILS NFR BLD: 0.9 % (ref 0–1.9)
BILIRUB SERPL-MCNC: 0.6 MG/DL (ref 0.1–1)
BNP SERPL-MCNC: 591 PG/ML (ref 0–99)
BUN SERPL-MCNC: 26 MG/DL (ref 8–23)
CALCIUM SERPL-MCNC: 8.5 MG/DL (ref 8.7–10.5)
CHLORIDE SERPL-SCNC: 109 MMOL/L (ref 95–110)
CO2 SERPL-SCNC: 24 MMOL/L (ref 23–29)
CREAT SERPL-MCNC: 2.2 MG/DL (ref 0.5–1.4)
DIFFERENTIAL METHOD: ABNORMAL
DIFFERENTIAL METHOD: ABNORMAL
EOSINOPHIL # BLD AUTO: 0.2 K/UL (ref 0–0.5)
EOSINOPHIL # BLD AUTO: 0.2 K/UL (ref 0–0.5)
EOSINOPHIL NFR BLD: 4.5 % (ref 0–8)
EOSINOPHIL NFR BLD: 4.5 % (ref 0–8)
ERYTHROCYTE [DISTWIDTH] IN BLOOD BY AUTOMATED COUNT: 16.6 % (ref 11.5–14.5)
ERYTHROCYTE [DISTWIDTH] IN BLOOD BY AUTOMATED COUNT: 16.6 % (ref 11.5–14.5)
EST. GFR  (NO RACE VARIABLE): 24 ML/MIN/1.73 M^2
GLUCOSE SERPL-MCNC: 130 MG/DL (ref 70–110)
HCT VFR BLD AUTO: 32.7 % (ref 37–48.5)
HCT VFR BLD AUTO: 32.7 % (ref 37–48.5)
HGB BLD-MCNC: 10.2 G/DL (ref 12–16)
HGB BLD-MCNC: 10.2 G/DL (ref 12–16)
IMM GRANULOCYTES # BLD AUTO: 0.01 K/UL (ref 0–0.04)
IMM GRANULOCYTES # BLD AUTO: 0.01 K/UL (ref 0–0.04)
IMM GRANULOCYTES NFR BLD AUTO: 0.3 % (ref 0–0.5)
IMM GRANULOCYTES NFR BLD AUTO: 0.3 % (ref 0–0.5)
LYMPHOCYTES # BLD AUTO: 0.9 K/UL (ref 1–4.8)
LYMPHOCYTES # BLD AUTO: 0.9 K/UL (ref 1–4.8)
LYMPHOCYTES NFR BLD: 26 % (ref 18–48)
LYMPHOCYTES NFR BLD: 26 % (ref 18–48)
MCH RBC QN AUTO: 27.9 PG (ref 27–31)
MCH RBC QN AUTO: 27.9 PG (ref 27–31)
MCHC RBC AUTO-ENTMCNC: 31.2 G/DL (ref 32–36)
MCHC RBC AUTO-ENTMCNC: 31.2 G/DL (ref 32–36)
MCV RBC AUTO: 90 FL (ref 82–98)
MCV RBC AUTO: 90 FL (ref 82–98)
MONOCYTES # BLD AUTO: 0.6 K/UL (ref 0.3–1)
MONOCYTES # BLD AUTO: 0.6 K/UL (ref 0.3–1)
MONOCYTES NFR BLD: 16.9 % (ref 4–15)
MONOCYTES NFR BLD: 16.9 % (ref 4–15)
NEUTROPHILS # BLD AUTO: 1.7 K/UL (ref 1.8–7.7)
NEUTROPHILS # BLD AUTO: 1.7 K/UL (ref 1.8–7.7)
NEUTROPHILS NFR BLD: 51.4 % (ref 38–73)
NEUTROPHILS NFR BLD: 51.4 % (ref 38–73)
NRBC BLD-RTO: 0 /100 WBC
NRBC BLD-RTO: 0 /100 WBC
PLATELET # BLD AUTO: 205 K/UL (ref 150–450)
PLATELET # BLD AUTO: 205 K/UL (ref 150–450)
PMV BLD AUTO: 8.9 FL (ref 9.2–12.9)
PMV BLD AUTO: 8.9 FL (ref 9.2–12.9)
POTASSIUM SERPL-SCNC: 4.4 MMOL/L (ref 3.5–5.1)
PROT SERPL-MCNC: 7.8 G/DL (ref 6–8.4)
RBC # BLD AUTO: 3.65 M/UL (ref 4–5.4)
RBC # BLD AUTO: 3.65 M/UL (ref 4–5.4)
SODIUM SERPL-SCNC: 145 MMOL/L (ref 136–145)
WBC # BLD AUTO: 3.31 K/UL (ref 3.9–12.7)
WBC # BLD AUTO: 3.31 K/UL (ref 3.9–12.7)

## 2023-12-27 PROCEDURE — 36415 COLL VENOUS BLD VENIPUNCTURE: CPT | Mod: HCNC | Performed by: NURSE PRACTITIONER

## 2023-12-27 PROCEDURE — 85025 COMPLETE CBC W/AUTO DIFF WBC: CPT | Mod: HCNC

## 2023-12-27 PROCEDURE — 83880 ASSAY OF NATRIURETIC PEPTIDE: CPT | Mod: HCNC | Performed by: NURSE PRACTITIONER

## 2023-12-27 PROCEDURE — 80053 COMPREHEN METABOLIC PANEL: CPT | Mod: HCNC | Performed by: NURSE PRACTITIONER

## 2023-12-27 NOTE — TELEPHONE ENCOUNTER
Called patient and notified of the results and recommendations. Patient verbalized understanding, Edelmira Garcia

## 2023-12-27 NOTE — TELEPHONE ENCOUNTER
----- Message from Luz Dickson NP sent at 12/27/2023  8:18 AM CST -----  Some possible EKG changes.   Please schedule follow up appt with Dr King.

## 2023-12-27 NOTE — TELEPHONE ENCOUNTER
----- Message from Luz Dickson NP sent at 12/27/2023  9:12 AM CST -----  BNP elevated, possible fluid overload. Take additional Lasix (40 mg total) daily for the next 3 days.

## 2023-12-29 ENCOUNTER — OFFICE VISIT (OUTPATIENT)
Dept: PRIMARY CARE CLINIC | Facility: CLINIC | Age: 68
End: 2023-12-29
Payer: MEDICARE

## 2023-12-29 ENCOUNTER — TELEPHONE (OUTPATIENT)
Dept: TRANSPLANT | Facility: CLINIC | Age: 68
End: 2023-12-29
Payer: MEDICARE

## 2023-12-29 VITALS
HEART RATE: 88 BPM | SYSTOLIC BLOOD PRESSURE: 132 MMHG | DIASTOLIC BLOOD PRESSURE: 74 MMHG | OXYGEN SATURATION: 98 % | TEMPERATURE: 98 F | HEIGHT: 62 IN | BODY MASS INDEX: 29.25 KG/M2 | WEIGHT: 158.94 LBS

## 2023-12-29 DIAGNOSIS — J06.9 URI, ACUTE: ICD-10-CM

## 2023-12-29 DIAGNOSIS — J40 BRONCHITIS: Primary | ICD-10-CM

## 2023-12-29 PROCEDURE — 3008F BODY MASS INDEX DOCD: CPT | Mod: HCNC,CPTII,S$GLB, | Performed by: INTERNAL MEDICINE

## 2023-12-29 PROCEDURE — 1101F PT FALLS ASSESS-DOCD LE1/YR: CPT | Mod: HCNC,CPTII,S$GLB, | Performed by: INTERNAL MEDICINE

## 2023-12-29 PROCEDURE — 3078F PR MOST RECENT DIASTOLIC BLOOD PRESSURE < 80 MM HG: ICD-10-PCS | Mod: HCNC,CPTII,S$GLB, | Performed by: INTERNAL MEDICINE

## 2023-12-29 PROCEDURE — 1159F MED LIST DOCD IN RCRD: CPT | Mod: HCNC,CPTII,S$GLB, | Performed by: INTERNAL MEDICINE

## 2023-12-29 PROCEDURE — 99999 PR PBB SHADOW E&M-EST. PATIENT-LVL III: CPT | Mod: PBBFAC,HCNC,, | Performed by: INTERNAL MEDICINE

## 2023-12-29 PROCEDURE — 1126F AMNT PAIN NOTED NONE PRSNT: CPT | Mod: HCNC,CPTII,S$GLB, | Performed by: INTERNAL MEDICINE

## 2023-12-29 PROCEDURE — 99999 PR PBB SHADOW E&M-EST. PATIENT-LVL III: ICD-10-PCS | Mod: PBBFAC,HCNC,, | Performed by: INTERNAL MEDICINE

## 2023-12-29 PROCEDURE — 3075F SYST BP GE 130 - 139MM HG: CPT | Mod: HCNC,CPTII,S$GLB, | Performed by: INTERNAL MEDICINE

## 2023-12-29 PROCEDURE — 1157F PR ADVANCE CARE PLAN OR EQUIV PRESENT IN MEDICAL RECORD: ICD-10-PCS | Mod: HCNC,CPTII,S$GLB, | Performed by: INTERNAL MEDICINE

## 2023-12-29 PROCEDURE — 3008F PR BODY MASS INDEX (BMI) DOCUMENTED: ICD-10-PCS | Mod: HCNC,CPTII,S$GLB, | Performed by: INTERNAL MEDICINE

## 2023-12-29 PROCEDURE — 1157F ADVNC CARE PLAN IN RCRD: CPT | Mod: HCNC,CPTII,S$GLB, | Performed by: INTERNAL MEDICINE

## 2023-12-29 PROCEDURE — 3044F HG A1C LEVEL LT 7.0%: CPT | Mod: HCNC,CPTII,S$GLB, | Performed by: INTERNAL MEDICINE

## 2023-12-29 PROCEDURE — 1126F PR PAIN SEVERITY QUANTIFIED, NO PAIN PRESENT: ICD-10-PCS | Mod: HCNC,CPTII,S$GLB, | Performed by: INTERNAL MEDICINE

## 2023-12-29 PROCEDURE — 3075F PR MOST RECENT SYSTOLIC BLOOD PRESS GE 130-139MM HG: ICD-10-PCS | Mod: HCNC,CPTII,S$GLB, | Performed by: INTERNAL MEDICINE

## 2023-12-29 PROCEDURE — 3078F DIAST BP <80 MM HG: CPT | Mod: HCNC,CPTII,S$GLB, | Performed by: INTERNAL MEDICINE

## 2023-12-29 PROCEDURE — 3288F FALL RISK ASSESSMENT DOCD: CPT | Mod: HCNC,CPTII,S$GLB, | Performed by: INTERNAL MEDICINE

## 2023-12-29 PROCEDURE — 99213 OFFICE O/P EST LOW 20 MIN: CPT | Mod: HCNC,S$GLB,, | Performed by: INTERNAL MEDICINE

## 2023-12-29 PROCEDURE — 3288F PR FALLS RISK ASSESSMENT DOCUMENTED: ICD-10-PCS | Mod: HCNC,CPTII,S$GLB, | Performed by: INTERNAL MEDICINE

## 2023-12-29 PROCEDURE — 3066F NEPHROPATHY DOC TX: CPT | Mod: HCNC,CPTII,S$GLB, | Performed by: INTERNAL MEDICINE

## 2023-12-29 PROCEDURE — 3044F PR MOST RECENT HEMOGLOBIN A1C LEVEL <7.0%: ICD-10-PCS | Mod: HCNC,CPTII,S$GLB, | Performed by: INTERNAL MEDICINE

## 2023-12-29 PROCEDURE — 1101F PR PT FALLS ASSESS DOC 0-1 FALLS W/OUT INJ PAST YR: ICD-10-PCS | Mod: HCNC,CPTII,S$GLB, | Performed by: INTERNAL MEDICINE

## 2023-12-29 PROCEDURE — 1159F PR MEDICATION LIST DOCUMENTED IN MEDICAL RECORD: ICD-10-PCS | Mod: HCNC,CPTII,S$GLB, | Performed by: INTERNAL MEDICINE

## 2023-12-29 PROCEDURE — 99213 PR OFFICE/OUTPT VISIT, EST, LEVL III, 20-29 MIN: ICD-10-PCS | Mod: HCNC,S$GLB,, | Performed by: INTERNAL MEDICINE

## 2023-12-29 PROCEDURE — 3066F PR DOCUMENTATION OF TREATMENT FOR NEPHROPATHY: ICD-10-PCS | Mod: HCNC,CPTII,S$GLB, | Performed by: INTERNAL MEDICINE

## 2023-12-29 NOTE — PROGRESS NOTES
Nadia Damon  12/29/2023  [id number]    Elis Wick MD  Patient Care Team:  Elis Wick MD as PCP - General (Internal Medicine)  Miguel Soni Jr., MD as Consulting Physician (Vascular Surgery)  Ike King MD as Consulting Physician (Cardiology)  Courtney Tubbs MD as Consulting Physician (Cardiology)  Carter Crawford MD as Consulting Physician (Nephrology)  Paxton Vasques OD as Consulting Physician (Optometry)  PRANAY Villalobos MD as Obstetrician (Obstetrics)  Parker Mccarthy IV, MD (Urology)  Ike King MD as Consulting Physician (Cardiology)  Jose Roland MD as Consulting Physician (Dermatology)  Prasanth Johnson MD as Consulting Physician (Rheumatology)  Nora Morin LCSW as   Elis Wick MD as Physician (Internal Medicine)  Lexi Bianchi LCSW as  (Hematology and Oncology)  Candace Saleh LMSW as  (Hematology and Oncology)    Visit Type: Follow-up    Chief Complaint:  Chief Complaint   Patient presents with    Follow-up     2 week     History of Present Illness: Ms. aDmon is a 68 year old female here for f/u on cough.     Ms. Damon w h/o heart transplant 1993, on anti-rejection medication. 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 She was treated with 1 cycle of systemic chemotherapy cytoxan and taxotere and udenyca that was discontinued due to toxicity. She has been followed by radiation oncology and completed last radiation treatment 5/14/22. She is still followed by Heme/Onc for Epo, initiated for anemia due to stage 4 CKD.  Other medical issues include depresssion/anxiety/insomnia, hypertension, chronic diastolic CHF, and osteoporois for which she is receiving Prolia.     Here today to f/u bronchitis - Continued fatigue no appetite but has been feeling a bit better since getting breathing treatment here and tylenol  helping w sinus headache - No recent BM but staying hydrated - voice hoarse but no sore throat    PHQ-4 Score: 0     Recent appointments:   12/26/23 [facility]  bronchitis  12/11/23 Heme/Onc Burk CKD4  12/07/23 Pod [place]  L foot def  12/04/23 Nephro Kamyar CKD4  11/29/23 Urology [place] urge incontinence    Upcoming appointments:  Future Appointments       Next 10 Appointments       Date Provider Specialty Appt Notes    1/8/2024  Lab STAT    1/8/2024  Chemotherapy poss epo after lab ow/cbd    1/31/2024 Elis Wick MD Primary Care 1 month f/u    2/8/2024  Lab STAT    2/8/2024  Chemotherapy poss epo after lab ow/cbd    3/8/2024  Lab STAT    3/8/2024  Chemotherapy poss epo after lab ow/cbd    4/8/2024  Lab STAT    4/8/2024  Chemotherapy poss epo after lab ow/cbd    7/3/2024  Lab               Displaying the next 10 appointments. This patient has additional appointments scheduled.           The following were reviewed: Active problem list, medication list, allergies, family history, social history, and Health Maintenance.     Medications have been reviewed and reconciled with patient at visit today.    Review of Systems   See HPI above    Exam: sat 98%  Vitals:    12/29/23 1528   BP: 132/74   Pulse: 88   Temp: 98.2 °F (36.8 °C)     Weight: 72.1 kg (158 lb 15.2 oz)   Body mass index is 29.07 kg/m².    BP Readings from Last 3 Encounters:   12/29/23 132/74   12/26/23 136/70   12/11/23 (!) 140/83      Wt Readings from Last 3 Encounters:   12/29/23 1528 72.1 kg (158 lb 15.2 oz)   12/26/23 1336 72.9 kg (160 lb 11.5 oz)   12/11/23 1322 74 kg (163 lb 2.3 oz)      Physical Exam  Vitals reviewed.   Constitutional:       General: She is not in acute distress.     Appearance: Normal appearance.   HENT:      Head: Normocephalic and atraumatic.      Right Ear: External ear normal.      Left Ear: External ear normal.      Nose:      Comments: Moderately enlarged turbinates B     Mouth/Throat:      Mouth: Mucous membranes  are moist.      Pharynx: Oropharynx is clear. No oropharyngeal exudate or posterior oropharyngeal erythema.   Eyes:      Extraocular Movements: Extraocular movements intact.      Conjunctiva/sclera: Conjunctivae normal.   Cardiovascular:      Rate and Rhythm: Normal rate and regular rhythm.      Heart sounds: No murmur heard.  Pulmonary:      Effort: Pulmonary effort is normal. No respiratory distress.      Breath sounds: No wheezing, rhonchi or rales.   Abdominal:      General: Bowel sounds are normal.      Palpations: Abdomen is soft.      Tenderness: There is abdominal tenderness. There is no guarding.      Comments: Mod tenderness over hernia   Musculoskeletal:      Right lower leg: Edema present.      Left lower leg: Edema present.   Skin:     General: Skin is warm and dry.   Neurological:      Mental Status: She is alert. Mental status is at baseline.      Gait: Gait normal.   Psychiatric:         Behavior: Behavior normal.         Thought Content: Thought content normal.        Laboratory Reviewed  Lab Results   Component Value Date    WBC 3.31 (L) 12/27/2023    WBC 3.31 (L) 12/27/2023    HGB 10.2 (L) 12/27/2023    HGB 10.2 (L) 12/27/2023    HCT 32.7 (L) 12/27/2023    HCT 32.7 (L) 12/27/2023     12/27/2023     12/27/2023    MCV 90 12/27/2023    MCV 90 12/27/2023    CHOL 156 09/19/2023    TRIG 176 (H) 09/19/2023    HDL 34 (L) 09/19/2023    LDLCALC 86.8 09/19/2023    ALT 19 12/27/2023    AST 32 12/27/2023     12/27/2023    K 4.4 12/27/2023     12/27/2023    CREATININE 2.2 (H) 12/27/2023    BUN 26 (H) 12/27/2023    CO2 24 12/27/2023    MG 1.8 06/20/2023    TSH 7.542 (H) 11/14/2023    FREET4 0.83 11/14/2023    PSA <0.1 05/27/2008    INR 1.0 11/22/2021    HGBA1C 5.1 03/08/2023    CRP 14.4 (H) 08/24/2023     Lab Results   Component Value Date    .9 (H) 10/11/2023    CALCIUM 8.5 (L) 12/27/2023    CAION 1.13 09/05/2018    PHOS 4.6 (H) 11/15/2023      Lab Results   Component Value Date     RZJDYFTT38 1373 (H) 06/20/2023     Lab Results   Component Value Date    FOLATE 20.0 06/20/2023      Lab Results   Component Value Date    IRON 135 11/14/2023    TRANSFERRIN 221 11/14/2023    TIBC 327 11/14/2023    FESATURATED 41 11/14/2023      Lab Results   Component Value Date    EGFRNORACEVR 24 (A) 12/27/2023    ALBUMIN 3.5 12/27/2023     (H) 12/27/2023     Lab Results   Component Value Date    TWNNIQLW18TG 36 07/06/2023      CXR 12/26/23  Negative for acute process involving the chest.     Assessment:   68 y.o. female with multiple co-morbid illnesses here for continued follow up of medical problems.      The primary encounter diagnosis was Bronchitis. A diagnosis of URI, acute was also pertinent to this visit.      Plan:   1. Bronchitis  Assessment & Plan:  Cont current Rx - symptomatic Tx - f/u as needed      2. URI, acute  Assessment & Plan:  Recommend add intra-nasal saline water flush to help w sinus congestion           Health Maintenance         Date Due Completion Date    RSV Vaccine (Age 60+ and Pregnant patients) (1 - 1-dose 60+ series) Never done ---    COVID-19 Vaccine (4 - 2023-24 season) 09/01/2023 4/27/2023    Pneumococcal Vaccines (Age 65+) (3 - PPSV23 or PCV20) 10/22/2023 10/22/2018    Mammogram 04/10/2024 4/10/2023    Lipid Panel 09/19/2024 9/19/2023    DEXA Scan 01/25/2026 1/25/2023    Colorectal Cancer Screening 02/25/2026 2/25/2021    Hemoglobin A1c (Diabetic Prevention Screening) 03/08/2026 3/8/2023    TETANUS VACCINE 09/09/2030 9/9/2020          -Patient's lab results were reviewed and discussed with patient  -Treatment options and alternatives were discussed with the patient. Patient expressed understanding. Patient was given the opportunity to ask questions and be an active participant in their medical care. Patient had no further questions or concerns at this time.   -Patient is an overall moderate to HIGH risk for health complications from their medical conditions.      Follow up: Follow up in about 1 month (around 1/29/2024) for Follow Up w me.    After visit summary printed and given to patient upon discharge.  Patient care plan are included in After visit summary.    TOTAL TIME evaluating and managing this patient for this encounter was greater than 20 minutes. This time was spent personally by me on some of the following activities: review of patient's past medical history, assessing age-appropriate health maintenance needs, review of any interval history, review and interpretation of lab results, review and interpretation of imaging test results, review and interpretation of cardiology test results, reviewing consulting specialist notes, obtaining history from the patient and family, examination of the patient, medication reconciliation, managing and/or ordering prescription medications, ordering imaging tests, ordering referral to subspecialty provider(s), educating patient and answering their questions about diagnosis, treatment plan, and goals of treatment, discussing planned follow-up and final documentation of the visit. This time was exclusive of any separately billable procedures for this patient and exclusive of time spent treating any other patients.

## 2023-12-29 NOTE — PATIENT INSTRUCTIONS
If you are feeling unwell, we'd like to be the first ones to know here at [facility]! Please give us a call. Same day appointments are our top priority to keep you well and out of the emergency rooms and hospitals. Call [phone] for our direct line. After hours advice is always available. Please call [phone] after hours to speak to the on-call team.      Let us know if not feeling better next week    Hydrate - recommend also intra-nasal saline water flush at least 2x daily - can use up to every 2 hrs to help w sinus congestion

## 2023-12-31 NOTE — TELEPHONE ENCOUNTER
Spoke with pt, she has been doing better now after having a upper respiratory infection, she is being treated by her PCP and is taking Doxycycline.

## 2024-01-04 ENCOUNTER — TELEPHONE (OUTPATIENT)
Dept: PRIMARY CARE CLINIC | Facility: CLINIC | Age: 69
End: 2024-01-04
Payer: MEDICARE

## 2024-01-08 ENCOUNTER — TELEPHONE (OUTPATIENT)
Dept: HEMATOLOGY/ONCOLOGY | Facility: CLINIC | Age: 69
End: 2024-01-08
Payer: MEDICARE

## 2024-01-08 NOTE — TELEPHONE ENCOUNTER
----- Message from Farnaz Smith sent at 1/8/2024 10:39 AM CST -----  Contact: [phone]    Patient: Nelson Zuniga,    Reason: The patient is requesting a call back from the nurse to get assistance with rescheduling an     appointments.     Comments: Please call the patient back to advise.

## 2024-01-09 ENCOUNTER — INFUSION (OUTPATIENT)
Dept: INFUSION THERAPY | Facility: HOSPITAL | Age: 69
End: 2024-01-09
Attending: INTERNAL MEDICINE
Payer: MEDICARE

## 2024-01-09 VITALS
OXYGEN SATURATION: 99 % | DIASTOLIC BLOOD PRESSURE: 79 MMHG | HEART RATE: 101 BPM | RESPIRATION RATE: 16 BRPM | TEMPERATURE: 98 F | SYSTOLIC BLOOD PRESSURE: 134 MMHG

## 2024-01-09 DIAGNOSIS — D63.1 ANEMIA ASSOCIATED WITH STAGE 4 CHRONIC RENAL FAILURE: ICD-10-CM

## 2024-01-09 DIAGNOSIS — M81.8 OTHER OSTEOPOROSIS WITHOUT CURRENT PATHOLOGICAL FRACTURE: Primary | ICD-10-CM

## 2024-01-09 DIAGNOSIS — N18.4 ANEMIA ASSOCIATED WITH STAGE 4 CHRONIC RENAL FAILURE: ICD-10-CM

## 2024-01-09 PROCEDURE — 63600175 PHARM REV CODE 636 W HCPCS: Mod: JZ,EC,JG,HCNC

## 2024-01-09 PROCEDURE — 96372 THER/PROPH/DIAG INJ SC/IM: CPT | Mod: HCNC

## 2024-01-09 RX ADMIN — EPOETIN ALFA-EPBX 20000 UNITS: 10000 INJECTION, SOLUTION INTRAVENOUS; SUBCUTANEOUS at 01:01

## 2024-01-09 NOTE — DISCHARGE INSTRUCTIONS
..[facility]  [address] [facility]  [address] [address]  [phone] phone     [phone] fax  Hours of Operation: Monday- Friday 8:00am- 5:00pm  After hours phone  [phone]  Hematology / Oncology Physicians on call    Dr. Dennis Chakraborty      Nurse Practitioners:    Kristine Delgado, ONELIA Duran, ONELIA Mendoza, ONELIA Marina, ONELIA Montero, ONELIA Burk, [place]      Please don't hesitate to call if you have any concerns.    .FALL PREVENTION   Falls often occur due to slipping, tripping or losing your balance. Here are ways to reduce your risk of falling again.   Was there anything that caused your fall that can be fixed, removed or replaced?   Make your home safe by keeping walkways clear of objects you may trip over.   Use non-slip pads under rugs.   Do not walk in poorly lit areas.   Do not stand on chairs or wobbly ladders.   Use caution when reaching overhead or looking upward. This position can cause a loss of balance.   Be sure your shoes fit properly, have non-slip bottoms and are in good condition.   Be cautious when going up and down stairs, curbs, and when walking on uneven sidewalks.   If your balance is poor, consider using a cane or walker.   If your fall was related to alcohol use, stop or limit alcohol intake.   If your fall was related to use of sleeping medicines, talk to your doctor about this. You may need to reduce your dosage at bedtime if you awaken during the night to go to the bathroom.   To reduce the need for nighttime bathroom trips:   Avoid drinking fluids for several hours before going to bed   Empty your bladder before going to bed   Men can keep a urinal at the bedside   © 8907-1635 [employer], [address] [place] [address], [place] [address]. All rights reserved. This information is not intended as a substitute for professional medical care. Always follow your healthcare  professional's instructions.  .WAYS TO HELP PREVENT INFECTION        WASH YOUR HANDS OFTEN DURING THE DAY, ESPECIALLY BEFORE YOU EAT, AFTER USING THE BATHROOM, AND AFTER TOUCHING ANIMALS    STAY AWAY FROM PEOPLE WHO HAVE ILLNESSES YOU CAN CATCH; SUCH AS COLDS, FLU, CHICKEN POX    TRY TO AVOID CROWDS    STAY AWAY FROM CHILDREN WHO RECENTLY HAVE RECEIVED LIVE VIRUS VACCINES    MAINTAIN GOOD MOUTH CARE    DO NOT SQUEEZE OR SCRATCH PIMPLES    CLEAN CUTS & SCRAPES RIGHT AWAY AND DAILY UNTIL HEALED WITH WARM WATER, SOAP & AN ANTISEPTIC    AVOID CONTACT WITH LITTER BOXES, BIRD CAGES, & FISH TANKS    AVOID STANDING WATER, IE., BIRD BATHS, FLOWER POTS/VASES, OR HUMIDIFIERS    WEAR GLOVES WHEN GARDENING OR CLEANING UP AFTER OTHERS, ESPECIALLY BABIES & SMALL CHILDREN    DO NOT EAT RAW FISH, SEAFOOD, MEAT, OR EGGS

## 2024-01-11 ENCOUNTER — OFFICE VISIT (OUTPATIENT)
Dept: UROLOGY | Facility: CLINIC | Age: 69
End: 2024-01-11
Payer: MEDICARE

## 2024-01-11 VITALS
HEART RATE: 74 BPM | HEIGHT: 62 IN | WEIGHT: 159.75 LBS | BODY MASS INDEX: 29.4 KG/M2 | SYSTOLIC BLOOD PRESSURE: 120 MMHG | DIASTOLIC BLOOD PRESSURE: 82 MMHG | RESPIRATION RATE: 14 BRPM

## 2024-01-11 DIAGNOSIS — N39.41 URGE INCONTINENCE OF URINE: ICD-10-CM

## 2024-01-11 DIAGNOSIS — N32.81 OAB (OVERACTIVE BLADDER): Primary | ICD-10-CM

## 2024-01-11 DIAGNOSIS — N39.3 SUI (STRESS URINARY INCONTINENCE, FEMALE): ICD-10-CM

## 2024-01-11 PROBLEM — J01.10 ACUTE FRONTAL SINUSITIS: Status: RESOLVED | Noted: 2023-12-26 | Resolved: 2024-01-11

## 2024-01-11 PROBLEM — J06.9 URI, ACUTE: Status: RESOLVED | Noted: 2023-12-29 | Resolved: 2024-01-11

## 2024-01-11 PROCEDURE — 1126F AMNT PAIN NOTED NONE PRSNT: CPT | Mod: HCNC,CPTII,S$GLB, | Performed by: NURSE PRACTITIONER

## 2024-01-11 PROCEDURE — 87088 URINE BACTERIA CULTURE: CPT | Mod: HCNC | Performed by: NURSE PRACTITIONER

## 2024-01-11 PROCEDURE — 99999 PR PBB SHADOW E&M-EST. PATIENT-LVL IV: CPT | Mod: PBBFAC,HCNC,, | Performed by: NURSE PRACTITIONER

## 2024-01-11 PROCEDURE — 87077 CULTURE AEROBIC IDENTIFY: CPT | Mod: HCNC | Performed by: NURSE PRACTITIONER

## 2024-01-11 PROCEDURE — 99214 OFFICE O/P EST MOD 30 MIN: CPT | Mod: HCNC,S$GLB,, | Performed by: NURSE PRACTITIONER

## 2024-01-11 PROCEDURE — 3008F BODY MASS INDEX DOCD: CPT | Mod: HCNC,CPTII,S$GLB, | Performed by: NURSE PRACTITIONER

## 2024-01-11 PROCEDURE — 1157F ADVNC CARE PLAN IN RCRD: CPT | Mod: HCNC,CPTII,S$GLB, | Performed by: NURSE PRACTITIONER

## 2024-01-11 PROCEDURE — 87186 SC STD MICRODIL/AGAR DIL: CPT | Mod: HCNC | Performed by: NURSE PRACTITIONER

## 2024-01-11 PROCEDURE — 1159F MED LIST DOCD IN RCRD: CPT | Mod: HCNC,CPTII,S$GLB, | Performed by: NURSE PRACTITIONER

## 2024-01-11 PROCEDURE — 3079F DIAST BP 80-89 MM HG: CPT | Mod: HCNC,CPTII,S$GLB, | Performed by: NURSE PRACTITIONER

## 2024-01-11 PROCEDURE — 3074F SYST BP LT 130 MM HG: CPT | Mod: HCNC,CPTII,S$GLB, | Performed by: NURSE PRACTITIONER

## 2024-01-11 PROCEDURE — 87086 URINE CULTURE/COLONY COUNT: CPT | Mod: HCNC | Performed by: NURSE PRACTITIONER

## 2024-01-11 RX ORDER — SOLIFENACIN SUCCINATE 10 MG/1
10 TABLET, FILM COATED ORAL DAILY
Qty: 30 TABLET | Refills: 11 | Status: SHIPPED | OUTPATIENT
Start: 2024-01-11 | End: 2024-02-21

## 2024-01-11 NOTE — PROGRESS NOTES
Chief Complaint:   OAB  Mixed incontinence    HPI:   Patient is presenting as a follow-up to overactive bladder.  Patient is currently on VESIcare 5 mg once nightly and reports a 75 % decrease in overactive bladder symptoms.  Denies adverse side effects urine in clinic is negative and PVR is 14 mL.  11/29/2023  Patient is a 68-year-old female that is presenting as a follow-up to overactive bladder.  Patient has urge incontinence and is wearing a pad that she changes several times a day.  She was prescribed Myrbetriq, however, was hospitalized secondary to an increase in potassium.  Patient reports that primary care provider discontinued Myrbetriq secondary to possible interaction related to increase potassium level.  Patient states that medication decreasing her urge incontinence and daytime frequency.  Is requesting a new medication or treatment option.     Allergies:  Lisinopril, Augmentin [amoxicillin-pot clavulanate], and Zyvox [linezolid]    Medications:  has a current medication list which includes the following prescription(s): albuterol, biotin, calcitonin (salmon), carvedilol, cyclosporine modified (neoral), ergocalciferol, evening primrose oil, furosemide, guaifenesin, hydralazine, hydrocortisone, multivitamin, nifedipine, pantoprazole, patiromer calcium sorbitex, patiromer calcium sorbitex, polyethylene glycol, pregabalin, psyllium husk, retacrit, solifenacin, temazepam, UNABLE TO FIND, vitamin e, and zolpidem, and the following Facility-Administered Medications: acetaminophen and famotidine.    Review of Systems:  General: No fever, chills, fatigability, or weight loss.  Skin: No rashes, itching, or changes in color or texture of skin.  Chest: Denies DONOHUE, cyanosis, wheezing, cough, and sputum production.  Abdomen: Appetite fine. No weight loss. Denies diarrhea, abdominal pain, hematemesis, or blood in stool.  Musculoskeletal: No joint stiffness or swelling. Denies back pain.  : As above.  All other  review of systems negative.    PMH:   has a past medical history of Abdominal wall hernia, Abnormal mammogram (10/12/2021), GRACE (acute kidney injury) (11/22/2021), Anxiety, Arthritis, Breast cancer in female (08/2021), C. 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    PSH:   has a past surgical history that includes Cardiac pacemaker removal (Left, 06/26/2014); Bladder surgery (2015 approx); Carpal tunnel release (Left, 03/03/2015); Hysterectomy (1983); Hernia repair (Right, 1971 approx); Breast surgery (Left, 09/28/2015); Breast surgery (Right, 12/2015); Toe Surgery; Colonoscopy (N/A, 02/25/2021); Breast biopsy (Bilateral); Heart transplant (1993); Moca lymph node biopsy (Left, 10/12/2021); Insertion of tunneled central venous catheter (CVC) with subcutaneous port (N/A, 11/09/2021); Incision and drainage of abscess (Left, 12/24/2021); Removal of vascular access port; Breast lumpectomy (Left, 2021); Injection of anesthetic agent into sacroiliac joint  (Right, 08/22/2022); Injection of anesthetic agent around medial branch nerves innervating lumbar facet joint (Right, 10/19/2022); Injection of anesthetic agent around medial branch nerves innervating lumbar facet joint (Right, 11/09/2022); Breast biopsy (Right, 10/31/2022); Radiofrequency thermocoagulation (Right, 12/07/2022); Epidural steroid injection into cervical spine (N/A, 02/02/2023); Cystocele repair; Robot-assisted laparoscopic abdominal sacrocolpopexy (N/A, 8/10/2023); xi robotic urethropexy (N/A, 8/10/2023); and Robot-assisted laparoscopic oophorectomy (Right, 8/10/2023).    FamHx: family history includes Breast cancer in her maternal grandmother and mother; Cancer (age of onset: 38) in her mother; Cataracts in her cousin; Heart disease in her maternal grandmother; Hypertension in her son.    SocHx:  reports that she has never smoked. She has never been exposed to tobacco smoke. She has never used smokeless tobacco. She reports that she does not drink alcohol and does not use drugs.      Physical Exam:  Vitals:    01/11/24 1330   BP: 120/82   Pulse: 74   Resp: 14     General: A&Ox3, no apparent distress, no deformities  Neck: No masses, normal thyroid  Lungs: normal inspiration, no use of accessory muscles  Heart: normal pulse, no arrhythmias  Abdomen: Soft, NT, ND, no masses, no hernias, no hepatosplenomegaly  Lymphatic: Neck and groin nodes negative  Skin: The skin is warm and dry. No jaundice.    Labs/Studies:   See HPI    Impression/Plan:   Overactive bladder  Will increase VESIcare to 10 mg once daily and patient to return to clinic in 2-3 months for re-evaluation

## 2024-01-13 LAB — BACTERIA UR CULT: ABNORMAL

## 2024-01-14 RX ORDER — SULFAMETHOXAZOLE AND TRIMETHOPRIM 800; 160 MG/1; MG/1
1 TABLET ORAL 2 TIMES DAILY
Qty: 14 TABLET | Refills: 0 | Status: SHIPPED | OUTPATIENT
Start: 2024-01-14 | End: 2024-01-16

## 2024-01-16 ENCOUNTER — TELEPHONE (OUTPATIENT)
Dept: PRIMARY CARE CLINIC | Facility: CLINIC | Age: 69
End: 2024-01-16
Payer: MEDICARE

## 2024-01-16 DIAGNOSIS — N30.90 CYSTITIS: Primary | ICD-10-CM

## 2024-01-16 RX ORDER — CIPROFLOXACIN 500 MG/1
500 TABLET ORAL DAILY
Qty: 7 TABLET | Refills: 0 | Status: SHIPPED | OUTPATIENT
Start: 2024-01-16 | End: 2024-01-23

## 2024-01-16 RX ORDER — CIPROFLOXACIN 500 MG/1
500 TABLET ORAL DAILY
Qty: 7 TABLET | Refills: 0 | Status: SHIPPED | OUTPATIENT
Start: 2024-01-16 | End: 2024-01-16

## 2024-01-16 NOTE — TELEPHONE ENCOUNTER
Pt did not  Bactrim yet.  States she would like new rx sent to pharmacy below.     Otoniel Choudhary and [place]

## 2024-01-16 NOTE — TELEPHONE ENCOUNTER
----- Message from Elis Wick MD sent at 1/14/2024 11:13 AM CST -----  Regarding: UTI  Please f/u w Ms. Silveira to make sure she got [facility] message from Sri Gallagher on Sunday regarding UTI and to check that she was able to  and start prescription for Bactrim.   How is she feeling?    ----- Message -----  From: [employer]  Sent: 1/12/2024   8:56 PM CST  To: Elis Wick MD

## 2024-01-16 NOTE — TELEPHONE ENCOUNTER
Spoke with pt and let her know her results and to  rx from [facility] pharmacy.     SJ

## 2024-01-16 NOTE — TELEPHONE ENCOUNTER
----- Message from Elis Wick MD sent at 1/14/2024 11:18 AM CST -----  Regarding: UTI  Also - Bactrim not my favorite for elderly w decreased renal function. If she has already picked them up and started taking please advise to just take 1/2 tab twice daily for 7 days.  If she hasn't picked up and started let me know as would prefer to send a different antibiotic for her given her stage 4 CKD.

## 2024-01-17 ENCOUNTER — TELEPHONE (OUTPATIENT)
Dept: PRIMARY CARE CLINIC | Facility: CLINIC | Age: 69
End: 2024-01-17
Payer: MEDICARE

## 2024-01-23 ENCOUNTER — TELEPHONE (OUTPATIENT)
Dept: NEPHROLOGY | Facility: CLINIC | Age: 69
End: 2024-01-23
Payer: MEDICARE

## 2024-01-23 NOTE — TELEPHONE ENCOUNTER
Lrt her know that according to her last visit, the drs notes said to take veltassa every other day. The pharmacy was asking her to refill but she still has a good supply. Asked her to just let us know when she gets down to her last 3 pkgs when she needs refill. She agreed. 1/23/24/sf

## 2024-01-23 NOTE — TELEPHONE ENCOUNTER
----- Message from Timur Mendoza sent at 1/23/2024  2:11 PM CST -----  Contact: Nadia  Pt is calling in regards to wanting to know if she should continue taking patiromer calcium sorbitex (VELTASSA) 8.4 gram PwPk. Pt stated [facility] her about getting more refills but stated having several boxes left. Please call back at [phone]                        Thanks  KT

## 2024-01-24 DIAGNOSIS — N30.90 CYSTITIS: ICD-10-CM

## 2024-01-24 RX ORDER — CIPROFLOXACIN 500 MG/1
TABLET ORAL
Qty: 7 TABLET | Refills: 3 | OUTPATIENT
Start: 2024-01-24

## 2024-01-29 DIAGNOSIS — F51.01 PRIMARY INSOMNIA: ICD-10-CM

## 2024-01-30 RX ORDER — ZOLPIDEM TARTRATE 5 MG/1
5 TABLET ORAL NIGHTLY PRN
Qty: 90 TABLET | Refills: 0 | Status: SHIPPED | OUTPATIENT
Start: 2024-01-30 | End: 2024-05-01 | Stop reason: SDUPTHER

## 2024-02-05 ENCOUNTER — OFFICE VISIT (OUTPATIENT)
Dept: PRIMARY CARE CLINIC | Facility: CLINIC | Age: 69
End: 2024-02-05
Payer: MEDICARE

## 2024-02-05 VITALS
HEIGHT: 62 IN | DIASTOLIC BLOOD PRESSURE: 70 MMHG | BODY MASS INDEX: 29.56 KG/M2 | TEMPERATURE: 99 F | SYSTOLIC BLOOD PRESSURE: 132 MMHG | HEART RATE: 88 BPM | WEIGHT: 160.63 LBS | OXYGEN SATURATION: 99 %

## 2024-02-05 DIAGNOSIS — N18.4 CKD (CHRONIC KIDNEY DISEASE) STAGE 4, GFR 15-29 ML/MIN: Chronic | ICD-10-CM

## 2024-02-05 DIAGNOSIS — N30.00 ACUTE CYSTITIS WITHOUT HEMATURIA: Primary | ICD-10-CM

## 2024-02-05 DIAGNOSIS — R09.82 POSTNASAL DRIP: ICD-10-CM

## 2024-02-05 DIAGNOSIS — J40 BRONCHITIS: ICD-10-CM

## 2024-02-05 DIAGNOSIS — Z00.00 HEALTH CARE MAINTENANCE: ICD-10-CM

## 2024-02-05 DIAGNOSIS — D84.9 IMMUNOCOMPROMISED PATIENT: ICD-10-CM

## 2024-02-05 DIAGNOSIS — R82.90 BAD ODOR OF URINE: ICD-10-CM

## 2024-02-05 DIAGNOSIS — Z94.1 HEART TRANSPLANTED: Chronic | ICD-10-CM

## 2024-02-05 LAB
BILIRUB SERPL-MCNC: NORMAL MG/DL
BLOOD URINE, POC: NORMAL
CLARITY, POC UA: NORMAL
COLOR, POC UA: YELLOW
GLUCOSE UR QL STRIP: NORMAL
KETONES UR QL STRIP: NORMAL
LEUKOCYTE ESTERASE URINE, POC: NORMAL
NITRITE, POC UA: NORMAL
PH, POC UA: 5
PROTEIN, POC: NORMAL
SPECIFIC GRAVITY, POC UA: NORMAL
UROBILINOGEN, POC UA: 0.2

## 2024-02-05 PROCEDURE — 1160F RVW MEDS BY RX/DR IN RCRD: CPT | Mod: HCNC,CPTII,S$GLB, | Performed by: FAMILY MEDICINE

## 2024-02-05 PROCEDURE — 87077 CULTURE AEROBIC IDENTIFY: CPT | Mod: HCNC | Performed by: FAMILY MEDICINE

## 2024-02-05 PROCEDURE — 3008F BODY MASS INDEX DOCD: CPT | Mod: HCNC,CPTII,S$GLB, | Performed by: FAMILY MEDICINE

## 2024-02-05 PROCEDURE — 87088 URINE BACTERIA CULTURE: CPT | Mod: HCNC | Performed by: FAMILY MEDICINE

## 2024-02-05 PROCEDURE — 81002 URINALYSIS NONAUTO W/O SCOPE: CPT | Mod: HCNC,S$GLB,, | Performed by: FAMILY MEDICINE

## 2024-02-05 PROCEDURE — 1157F ADVNC CARE PLAN IN RCRD: CPT | Mod: HCNC,CPTII,S$GLB, | Performed by: FAMILY MEDICINE

## 2024-02-05 PROCEDURE — 99999 PR PBB SHADOW E&M-EST. PATIENT-LVL V: CPT | Mod: PBBFAC,HCNC,, | Performed by: FAMILY MEDICINE

## 2024-02-05 PROCEDURE — 3075F SYST BP GE 130 - 139MM HG: CPT | Mod: HCNC,CPTII,S$GLB, | Performed by: FAMILY MEDICINE

## 2024-02-05 PROCEDURE — 3078F DIAST BP <80 MM HG: CPT | Mod: HCNC,CPTII,S$GLB, | Performed by: FAMILY MEDICINE

## 2024-02-05 PROCEDURE — 99214 OFFICE O/P EST MOD 30 MIN: CPT | Mod: HCNC,S$GLB,, | Performed by: FAMILY MEDICINE

## 2024-02-05 PROCEDURE — 3288F FALL RISK ASSESSMENT DOCD: CPT | Mod: HCNC,CPTII,S$GLB, | Performed by: FAMILY MEDICINE

## 2024-02-05 PROCEDURE — 1101F PT FALLS ASSESS-DOCD LE1/YR: CPT | Mod: HCNC,CPTII,S$GLB, | Performed by: FAMILY MEDICINE

## 2024-02-05 PROCEDURE — 87186 SC STD MICRODIL/AGAR DIL: CPT | Mod: HCNC | Performed by: FAMILY MEDICINE

## 2024-02-05 PROCEDURE — 87086 URINE CULTURE/COLONY COUNT: CPT | Mod: HCNC | Performed by: FAMILY MEDICINE

## 2024-02-05 PROCEDURE — G0009 ADMIN PNEUMOCOCCAL VACCINE: HCPCS | Mod: HCNC,S$GLB,, | Performed by: FAMILY MEDICINE

## 2024-02-05 PROCEDURE — 1159F MED LIST DOCD IN RCRD: CPT | Mod: HCNC,CPTII,S$GLB, | Performed by: FAMILY MEDICINE

## 2024-02-05 PROCEDURE — 90677 PCV20 VACCINE IM: CPT | Mod: HCNC,S$GLB,, | Performed by: FAMILY MEDICINE

## 2024-02-05 RX ORDER — FLUTICASONE PROPIONATE 50 MCG
2 SPRAY, SUSPENSION (ML) NASAL DAILY
Qty: 16 G | Refills: 1 | Status: SHIPPED | OUTPATIENT
Start: 2024-02-05

## 2024-02-05 RX ORDER — SULFAMETHOXAZOLE AND TRIMETHOPRIM 400; 80 MG/1; MG/1
1 TABLET ORAL 2 TIMES DAILY
Qty: 10 TABLET | Refills: 0 | Status: SHIPPED | OUTPATIENT
Start: 2024-02-05 | End: 2024-02-10

## 2024-02-05 RX ORDER — LORATADINE 10 MG/1
10 TABLET ORAL DAILY
Qty: 30 TABLET | Refills: 1 | Status: SHIPPED | OUTPATIENT
Start: 2024-02-05 | End: 2024-05-01

## 2024-02-05 NOTE — PROGRESS NOTES
Nadia Damon  02/05/2024  [id number]    Elis Wick MD  Patient Care Team:  Elis Wick MD as PCP - General (Internal Medicine)  Miguel Soni Jr., MD as Consulting Physician (Vascular Surgery)  Ike King MD as Consulting Physician (Cardiology)  Courtney Tubbs MD as Consulting Physician (Cardiology)  Carter Crawford MD as Consulting Physician (Nephrology)  Paxton Vasques OD as Consulting Physician (Optometry)  PRANAY Villalobos MD as Obstetrician (Obstetrics)  Parker Mccarthy IV, MD (Urology)  Ike King MD as Consulting Physician (Cardiology)  Jose Roland MD as Consulting Physician (Dermatology)  Prasanth Johnson MD as Consulting Physician (Rheumatology)  Elis Wick MD as Physician (Internal Medicine)  Lexi Bianchi LCSW as  (Hematology and Oncology)  Candace Saleh LMSW as  (Hematology and Oncology)  Kelsie Burk PA-C as Physician Assistant (Hematology and Oncology)    Visit Type: Follow-up    Chief Complaint:  Chief Complaint   Patient presents with    Follow-up     One month follow up. Post nasal drip     History of Present Illness: Ms. Damon is a 68 year old female here for scheduled f/u.  Today reports postnasal drip and need to clear through every morning, for the past few weeks. Occasionally this makes her cough. Advised to use NS nasal drop, pt reports some help.   Recent UTI (+) klebsiella, resistant to macrobid. Finished 7 days of cipro 500 bid. Reports clearance of dysuria/frequency/urgency, but smells bad. Requests recheck.     Results for orders placed or performed in visit on 02/05/24   POCT urine dipstick without microscope   Result Value Ref Range    Color, UA Yellow     pH, UA 5.0     WBC, UA large     Nitrite, UA pos     Protein, POC trace     Glucose, UA neg     Ketones, UA neg     Urobilinogen, UA 0.2     Bilirubin, POC neg     Blood, UA neg     Clarity, UA Slightly Cloudy     Spec Grav UA       *Note: Due  to a large number of results and/or encounters for the requested time period, some results have not been displayed. A complete set of results can be found in Results Review.         Ms. Damon w h/o heart transplant 1993, on anti-rejection medication. 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 She was treated with 1 cycle of systemic chemotherapy cytoxan and taxotere and udenyca that was discontinued due to toxicity. She has been followed by radiation oncology and completed last radiation treatment 5/14/22. She is still followed by Heme/Onc for Epo, initiated for anemia due to stage 4 CKD.  Other medical issues include depresssion/anxiety/insomnia, hypertension, chronic diastolic CHF, and osteoporois for which she is receiving Prolia.      PHQ-4 Score: 0     From last visit w me 12/29/23  Here today to f/u bronchitis - Continued fatigue no appetite but has been feeling a bit better since getting breathing treatment here and tylenol helping w sinus headache - No recent BM but staying hydrated - voice hoarse but no sore throat  PHQ-4 Score: 0     Hosp/ED/Urgent Care:    Recent appointments:   1/11/2024 Urology Rushing  12/29/23 O65+ Wick  12/26/23 O65+ Randolph  bronchitis  12/11/23 Heme/Onc Burk CKD4  12/07/23 Pod Akunne  L foot def  12/04/23 Nephro Kamyar CKD4    Upcoming appointments:  Future Appointments       Next 10 Appointments       Date Provider Specialty Appt Notes    2/8/2024  Lab STAT    2/8/2024  Chemotherapy poss epo after lab ow/cbd    3/8/2024  Lab STAT    3/8/2024  Chemotherapy poss epo after lab ow/cbd    4/8/2024  Lab STAT    4/8/2024  Chemotherapy poss epo after lab ow/cbd    4/10/2024 Elis Wick MD Primary Care 2 month f/u    4/16/2024 Sri Gallagher NP Urology follow-up    7/3/2024  Lab     7/3/2024  Radiology               Displaying the next 10 appointments. This patient has additional  appointments scheduled.             The following were reviewed: Active problem list, medication list, allergies, family history, social history, and Health Maintenance.     Medications have been reviewed and reconciled with patient at visit today.    Review of Systems   Constitutional:  Negative for fatigue.   HENT:  Negative for rhinorrhea, sinus pressure/congestion, sneezing and sore throat.    Respiratory:  Negative for chest tightness and shortness of breath.    Genitourinary:  Negative for dysuria, flank pain, frequency, hematuria and urgency.      See HPI above    Exam:  Vitals:    02/05/24 1336   BP: 132/70   Pulse: 88   Temp: 98.8 °F (37.1 °C)     Weight: 72.8 kg (160 lb 9.7 oz)   Body mass index is 29.38 kg/m².    BP Readings from Last 3 Encounters:   02/05/24 132/70   01/11/24 120/82   01/09/24 134/79        Wt Readings from Last 3 Encounters:   02/05/24 1336 72.8 kg (160 lb 9.7 oz)   01/11/24 1330 72.4 kg (159 lb 11.6 oz)   12/29/23 1528 72.1 kg (158 lb 15.2 oz)        Physical Exam  Vitals and nursing note reviewed.   Constitutional:       General: She is not in acute distress.     Appearance: She is well-developed. She is not diaphoretic.   HENT:      Head: Normocephalic and atraumatic.      Nose: No congestion or rhinorrhea.      Mouth/Throat:      Pharynx: No oropharyngeal exudate.   Eyes:      General:         Right eye: No discharge.         Left eye: No discharge.      Pupils: Pupils are equal, round, and reactive to light.   Cardiovascular:      Rate and Rhythm: Regular rhythm.      Heart sounds: Normal heart sounds.   Pulmonary:      Effort: Pulmonary effort is normal.      Breath sounds: Normal breath sounds. No wheezing.   Musculoskeletal:         General: Normal range of motion.      Cervical back: Normal range of motion and neck supple.   Lymphadenopathy:      Cervical: No cervical adenopathy.   Skin:     General: Skin is warm and dry.   Neurological:      General: No focal deficit present.       Mental Status: She is alert and oriented to person, place, and time.          Laboratory Reviewed  Lab Results   Component Value Date    WBC 3.94 01/09/2024    HGB 9.7 (L) 01/09/2024    HCT 30.3 (L) 01/09/2024     01/09/2024    MCV 89 01/09/2024    CHOL 156 09/19/2023    TRIG 176 (H) 09/19/2023    HDL 34 (L) 09/19/2023    LDLCALC 86.8 09/19/2023    ALT 19 12/27/2023    AST 32 12/27/2023     12/27/2023    K 4.4 12/27/2023     12/27/2023    CREATININE 2.2 (H) 12/27/2023    BUN 26 (H) 12/27/2023    CO2 24 12/27/2023    MG 1.8 06/20/2023    TSH 7.542 (H) 11/14/2023    FREET4 0.83 11/14/2023    PSA <0.1 05/27/2008    INR 1.0 11/22/2021    HGBA1C 5.1 03/08/2023    CRP 14.4 (H) 08/24/2023     Lab Results   Component Value Date    .9 (H) 10/11/2023    CALCIUM 8.5 (L) 12/27/2023    CAION 1.13 09/05/2018    PHOS 4.6 (H) 11/15/2023      Lab Results   Component Value Date    JZKRVFVR90 1373 (H) 06/20/2023     Lab Results   Component Value Date    FOLATE 20.0 06/20/2023      Lab Results   Component Value Date    IRON 135 11/14/2023    TRANSFERRIN 221 11/14/2023    TIBC 327 11/14/2023    FESATURATED 41 11/14/2023      Lab Results   Component Value Date    EGFRNORACEVR 24 (A) 12/27/2023    ALBUMIN 3.5 12/27/2023     (H) 12/27/2023     Lab Results   Component Value Date    [id number] 36 07/06/2023          Assessment:   68 y.o. female with multiple co-morbid illnesses here for continued follow up of medical problems.      The primary encounter diagnosis was Acute cystitis without hematuria. Diagnoses of Postnasal drip, Bad odor of urine, Health care maintenance, Bronchitis, Heart transplanted, CKD (chronic kidney disease) stage 4, GFR 15-29 ml/min, and Immunocompromised patient were also pertinent to this visit.      Plan:   1. Acute cystitis without hematuria  Comments:  recent use of cipro for UTI.   Rx renal dose bactrim, per recent urine cx result.   urine sent for culture  Orders:  -      Urine culture  -     sulfamethoxazole-trimethoprim 400-80mg (BACTRIM,SEPTRA) 400-80 mg per tablet; Take 1 tablet by mouth 2 (two) times daily. for 5 days  Dispense: 10 tablet; Refill: 0    2. Postnasal drip  -     fluticasone propionate (FLONASE) 50 mcg/actuation nasal spray; 2 sprays (100 mcg total) by Each Nostril route once daily.  Dispense: 16 g; Refill: 1  -     loratadine (CLARITIN) 10 mg tablet; Take 1 tablet (10 mg total) by mouth once daily.  Dispense: 30 tablet; Refill: 1    3. Bad odor of urine  -     POCT urine dipstick without microscope    4. Health care maintenance  Comments:  pt states she is not able to take covid vaccine due to heart transplant related treatments. wears mask faithfully.   - PCV 20 today  - RSV at pharmacy  Orders:  -     (In Office Administered) Pneumococcal Conjugate Vaccine (20 Valent) (IM) (Preferred)    5. Bronchitis  Overview:  Never smoked    Assessment & Plan:  Cont current Rx - symptomatic Tx - f/u as needed      6. Heart transplanted  Overview:  5/93     Assessment & Plan:  F/U with cardiology ASAP due to episode of CP.  If it reoccurs contact EMS.      7. CKD (chronic kidney disease) stage 4, GFR 15-29 ml/min  Overview:  US Retro   5/16/2018---Mild cortical thinning and increased cortical echogenicity.  Findings can be seen with medical renal disease.  8/31/216--- Echogenic kidneys with diffuse cortical thinning suggesting  medical renal disease. 2 complex right renal cortical cysts.    Assessment & Plan:  GFR 24 12/2023      8. Immunocompromised patient         Health Maintenance         Date Due Completion Date    RSV Vaccine (Age 60+ and Pregnant patients) (1 - 1-dose 60+ series) Never done ---    COVID-19 Vaccine (4 - 2023-24 season) 09/01/2023 4/27/2023    Mammogram 04/10/2024 4/10/2023    Lipid Panel 09/19/2024 9/19/2023    DEXA Scan 01/25/2026 1/25/2023    Colorectal Cancer Screening 02/25/2026 2/25/2021    Hemoglobin A1c (Diabetic Prevention Screening) 03/08/2026  3/8/2023    TETANUS VACCINE 09/09/2030 9/9/2020            -Patient's lab results were reviewed and discussed with patient  -Treatment options and alternatives were discussed with the patient. Patient expressed understanding. Patient was given the opportunity to ask questions and be an active participant in their medical care. Patient had no further questions or concerns at this time.         Follow up: Follow up in about 2 months (around 4/5/2024).    Care Plan/Goals: Reviewed Yes   Goals    None         After visit summary printed and given to patient upon discharge.  Patient goals and care plan are included in After visit summary.    TOTAL TIME evaluating and managing this patient for this encounter was greater than    30 minutes. This time was spent personally by me on some of the following activities: review of patient's past medical history, assessing age-appropriate health maintenance needs, review of any interval history, review and interpretation of lab results, review and interpretation of imaging test results, review and interpretation of cardiology test results, reviewing consulting specialist notes, obtaining history from the patient and family, examination of the patient, medication reconciliation, managing and/or ordering prescription medications, ordering imaging tests, ordering referral to subspecialty provider(s), educating patient and answering their questions about diagnosis, treatment plan, and goals of treatment, discussing planned follow-up and final documentation of the visit. This time was exclusive of any separately billable procedures for this patient and exclusive of time spent treating any other patients.

## 2024-02-05 NOTE — PATIENT INSTRUCTIONS
Continue using normal saline nasal drop to irritate sinus, thin mucus and help w drainage, 3-5 times daily for symptoms  Trial of flonase, claritin once daily, through pollen season, as needed for symptoms  Urine showed infection - Rx bactrim SS twice daily for 5 days. Call if not cleared after 5 days.     If you are feeling unwell, we'd like to be the first ones to know here at [facility]! Please give us a call. Same day appointments are our top priority to keep you well and out of the emergency rooms and hospitals. Call [phone] for our direct line. After hours advice is always available. Please call [phone] after hours to speak to the on-call team.

## 2024-02-08 ENCOUNTER — INFUSION (OUTPATIENT)
Dept: INFUSION THERAPY | Facility: HOSPITAL | Age: 69
End: 2024-02-08
Attending: INTERNAL MEDICINE
Payer: MEDICARE

## 2024-02-08 VITALS
HEART RATE: 88 BPM | SYSTOLIC BLOOD PRESSURE: 138 MMHG | OXYGEN SATURATION: 99 % | RESPIRATION RATE: 18 BRPM | DIASTOLIC BLOOD PRESSURE: 68 MMHG | TEMPERATURE: 98 F

## 2024-02-08 DIAGNOSIS — N18.4 ANEMIA ASSOCIATED WITH STAGE 4 CHRONIC RENAL FAILURE: ICD-10-CM

## 2024-02-08 DIAGNOSIS — D63.1 ANEMIA ASSOCIATED WITH STAGE 4 CHRONIC RENAL FAILURE: ICD-10-CM

## 2024-02-08 DIAGNOSIS — M81.8 OTHER OSTEOPOROSIS WITHOUT CURRENT PATHOLOGICAL FRACTURE: Primary | ICD-10-CM

## 2024-02-08 LAB — BACTERIA UR CULT: ABNORMAL

## 2024-02-08 PROCEDURE — 96372 THER/PROPH/DIAG INJ SC/IM: CPT | Mod: HCNC

## 2024-02-08 PROCEDURE — 63600175 PHARM REV CODE 636 W HCPCS: Mod: JZ,EC,JG,HCNC

## 2024-02-08 RX ADMIN — EPOETIN ALFA-EPBX 20000 UNITS: 10000 INJECTION, SOLUTION INTRAVENOUS; SUBCUTANEOUS at 12:02

## 2024-02-20 DIAGNOSIS — G47.00 INSOMNIA, UNSPECIFIED TYPE: ICD-10-CM

## 2024-02-20 RX ORDER — TEMAZEPAM 30 MG/1
CAPSULE ORAL
Qty: 30 CAPSULE | Refills: 0 | OUTPATIENT
Start: 2024-02-20

## 2024-02-21 RX ORDER — SOLIFENACIN SUCCINATE 10 MG/1
10 TABLET, FILM COATED ORAL DAILY
Qty: 90 TABLET | Refills: 0 | Status: SHIPPED | OUTPATIENT
Start: 2024-02-21 | End: 2024-04-16

## 2024-02-23 ENCOUNTER — TELEPHONE (OUTPATIENT)
Dept: PRIMARY CARE CLINIC | Facility: CLINIC | Age: 69
End: 2024-02-23
Payer: MEDICARE

## 2024-02-26 ENCOUNTER — OFFICE VISIT (OUTPATIENT)
Dept: PRIMARY CARE CLINIC | Facility: CLINIC | Age: 69
End: 2024-02-26
Payer: MEDICARE

## 2024-02-26 ENCOUNTER — TELEPHONE (OUTPATIENT)
Dept: TRANSPLANT | Facility: CLINIC | Age: 69
End: 2024-02-26
Payer: MEDICARE

## 2024-02-26 VITALS
BODY MASS INDEX: 29.3 KG/M2 | HEART RATE: 96 BPM | OXYGEN SATURATION: 99 % | DIASTOLIC BLOOD PRESSURE: 72 MMHG | HEIGHT: 62 IN | TEMPERATURE: 99 F | SYSTOLIC BLOOD PRESSURE: 136 MMHG | WEIGHT: 159.19 LBS

## 2024-02-26 DIAGNOSIS — Z94.1 HEART TRANSPLANTED: ICD-10-CM

## 2024-02-26 DIAGNOSIS — D84.9 COVID-19 IN IMMUNOCOMPROMISED PATIENT: ICD-10-CM

## 2024-02-26 DIAGNOSIS — M79.7 FIBROMYALGIA: ICD-10-CM

## 2024-02-26 DIAGNOSIS — G47.00 INSOMNIA, UNSPECIFIED TYPE: ICD-10-CM

## 2024-02-26 DIAGNOSIS — U07.1 COVID-19 IN IMMUNOCOMPROMISED PATIENT: ICD-10-CM

## 2024-02-26 DIAGNOSIS — I10 ESSENTIAL HYPERTENSION: ICD-10-CM

## 2024-02-26 DIAGNOSIS — R50.9 FEVER, UNSPECIFIED FEVER CAUSE: Primary | ICD-10-CM

## 2024-02-26 DIAGNOSIS — J02.9 PHARYNGITIS, UNSPECIFIED ETIOLOGY: ICD-10-CM

## 2024-02-26 DIAGNOSIS — F51.01 PRIMARY INSOMNIA: ICD-10-CM

## 2024-02-26 DIAGNOSIS — D84.9 IMMUNOCOMPROMISED PATIENT: ICD-10-CM

## 2024-02-26 LAB
CTP QC/QA: YES
SARS-COV-2 AG RESP QL IA.RAPID: POSITIVE

## 2024-02-26 PROCEDURE — 99215 OFFICE O/P EST HI 40 MIN: CPT | Mod: HCNC,S$GLB,, | Performed by: NURSE PRACTITIONER

## 2024-02-26 PROCEDURE — 3075F SYST BP GE 130 - 139MM HG: CPT | Mod: HCNC,CPTII,S$GLB, | Performed by: NURSE PRACTITIONER

## 2024-02-26 PROCEDURE — 1101F PT FALLS ASSESS-DOCD LE1/YR: CPT | Mod: HCNC,CPTII,S$GLB, | Performed by: NURSE PRACTITIONER

## 2024-02-26 PROCEDURE — 99999 PR PBB SHADOW E&M-EST. PATIENT-LVL III: CPT | Mod: PBBFAC,HCNC,, | Performed by: NURSE PRACTITIONER

## 2024-02-26 PROCEDURE — 1125F AMNT PAIN NOTED PAIN PRSNT: CPT | Mod: HCNC,CPTII,S$GLB, | Performed by: NURSE PRACTITIONER

## 2024-02-26 PROCEDURE — 3288F FALL RISK ASSESSMENT DOCD: CPT | Mod: HCNC,CPTII,S$GLB, | Performed by: NURSE PRACTITIONER

## 2024-02-26 PROCEDURE — 3008F BODY MASS INDEX DOCD: CPT | Mod: HCNC,CPTII,S$GLB, | Performed by: NURSE PRACTITIONER

## 2024-02-26 PROCEDURE — 1159F MED LIST DOCD IN RCRD: CPT | Mod: HCNC,CPTII,S$GLB, | Performed by: NURSE PRACTITIONER

## 2024-02-26 PROCEDURE — 87811 SARS-COV-2 COVID19 W/OPTIC: CPT | Mod: QW,HCNC,S$GLB, | Performed by: NURSE PRACTITIONER

## 2024-02-26 PROCEDURE — 1157F ADVNC CARE PLAN IN RCRD: CPT | Mod: HCNC,CPTII,S$GLB, | Performed by: NURSE PRACTITIONER

## 2024-02-26 PROCEDURE — 3078F DIAST BP <80 MM HG: CPT | Mod: HCNC,CPTII,S$GLB, | Performed by: NURSE PRACTITIONER

## 2024-02-26 RX ORDER — LIDOCAINE 50 MG/G
1 PATCH TOPICAL DAILY PRN
Qty: 30 PATCH | Refills: 1 | Status: SHIPPED | OUTPATIENT
Start: 2024-02-26 | End: 2024-04-22

## 2024-02-26 RX ORDER — PREGABALIN 50 MG/1
50 CAPSULE ORAL 2 TIMES DAILY
Qty: 180 CAPSULE | OUTPATIENT
Start: 2024-02-26

## 2024-02-26 RX ORDER — HYDROCORTISONE 1 %
CREAM (GRAM) TOPICAL 2 TIMES DAILY
Qty: 35 G | Refills: 3 | Status: SHIPPED | OUTPATIENT
Start: 2024-02-26

## 2024-02-26 RX ORDER — CODEINE PHOSPHATE AND GUAIFENESIN 10; 100 MG/5ML; MG/5ML
5 SOLUTION ORAL NIGHTLY PRN
Qty: 118 ML | Refills: 0 | Status: CANCELLED | OUTPATIENT
Start: 2024-02-26 | End: 2024-03-27

## 2024-02-26 RX ORDER — AMOXICILLIN 500 MG/1
TABLET, FILM COATED ORAL
Qty: 20 TABLET | Refills: 0 | OUTPATIENT
Start: 2024-02-26

## 2024-02-26 RX ORDER — ZOLPIDEM TARTRATE 5 MG/1
5 TABLET ORAL NIGHTLY PRN
Qty: 90 TABLET | OUTPATIENT
Start: 2024-02-26

## 2024-02-26 RX ORDER — TEMAZEPAM 30 MG/1
30 CAPSULE ORAL NIGHTLY PRN
Qty: 30 CAPSULE | Refills: 5 | Status: CANCELLED | OUTPATIENT
Start: 2024-02-26 | End: 2024-08-24

## 2024-02-26 RX ORDER — FUROSEMIDE 20 MG/1
20 TABLET ORAL
Qty: 90 TABLET | Refills: 3 | OUTPATIENT
Start: 2024-02-26

## 2024-02-26 RX ORDER — BENZONATATE 100 MG/1
100 CAPSULE ORAL 3 TIMES DAILY PRN
Qty: 30 CAPSULE | Refills: 0 | Status: CANCELLED | OUTPATIENT
Start: 2024-02-26 | End: 2024-03-07

## 2024-02-26 NOTE — PROGRESS NOTES
Nadia Damon  02/28/2024  [id number]    Elis Wick MD  Patient Care Team:  Elis Wick MD as PCP - General (Internal Medicine)  Miguel Soni Jr., MD as Consulting Physician (Vascular Surgery)  Ike King MD as Consulting Physician (Cardiology)  Courtney Tubbs MD as Consulting Physician (Cardiology)  Carter Crawford MD as Consulting Physician (Nephrology)  Pxaton Vasques OD as Consulting Physician (Optometry)  PRANAY Villalobos MD as Obstetrician (Obstetrics)  Parker Mccarthy IV, MD (Urology)  Ike King MD as Consulting Physician (Cardiology)  Jose Roland MD as Consulting Physician (Dermatology)  Prasanth Johnson MD as Consulting Physician (Rheumatology)  Elis Wick MD as Physician (Internal Medicine)  Lexi Bianchi LCSW as  (Hematology and Oncology)  Candace Saleh LMSW as  (Hematology and Oncology)  Kelsie Burk PA-C as Physician Assistant (Hematology and Oncology)        [facility] Note      Chief Complaint:  Chief Complaint   Patient presents with    Sinusitis    Cough    Headache    Fever     X 2 days         Assessment/Plan:  1. Fever, unspecified fever cause  -     POCT Influenza A/B Rapid Antigen  -     SARS Coronavirus 2 Antigen, POCT Manual Read    2. Insomnia, unspecified type    3. COVID-19 in immunocompromised patient  Assessment & Plan:  Consulted with transplant coordinator  Paxlovid is contraindicated due to immunosuppression med (cyclosporine)  Rest  Drink plenty of clear fluids  Nasal saline spray to clear nasal drainage and help with nasal congestion  Zyrtec or Claritin to help dry mucus and post nasal drip  Mucinex or Mucinex DM for cough and chest congestion  Tylenol for fever, headache and body aches  Warm salt water gargles for throat comfort  Chloraseptic spray or lozenges for throat comfort  Isolate for 21 days  If SOB develops present to the ED for evaluation       Other orders  -      LIDOcaine (LIDODERM) 5 %; Place 1 patch onto the skin daily as needed (back pain). Remove & Discard patch within 12 hours or as directed by MD  Dispense: 30 patch; Refill: 1          Worry Score: 3  History of Present Illness:      S/P Heart transplant 30 years ago on immunosuppressants.   Ms. Marlow presents with a 3 day history of upper respiratory symptoms.   Last saw Dr. Wick in 12/23. She describes right sided sinus pressure, low grade fever, chills, scratchy throat, post nasal drip, voice hoarseness and need to clear throat.  Also, coughing mostly at night, productive of clear sputum. Temp max 99.0. Meds for symptom management include Tylenol, Claritin and Flonase. Currently denies SOB, wheezing.   Took Doxycycline for bronchitis in December.          The following were reviewed: Active problem list, medication list, allergies, family history, social history, and Health Maintenance.     History:  Past Medical History:   Diagnosis Date    Abdominal wall hernia     CT Renal 6/11/2018---Small fat containing superior ventral abdominal wall hernia at the epicardial pacing lead site.    Abnormal mammogram 10/12/2021    GRACE (acute kidney injury) 11/22/2021    Anxiety     Arthritis     ZEN HIPS    Breast cancer in female 08/2021    LEFT BREAST    C. difficile colitis 11/29/2021    Cellulitis of axilla, left 12/23/2021    Chronic diastolic heart failure 12/16/2021    Chronic kidney disease     stage 4, GFR 15-29 ml/min    Chronic midline low back pain without sciatica 06/18/2018    Closed nondisplaced fracture of distal phalanx of left great toe with routine healing 10/22/2018    Coronary artery disease 1993    heart transplant    Cystitis 05/10/2022    Depression     Encounter for blood transfusion     Fibromyalgia     on Lyrica    Heart failure     native heart cardiomyopathy    Heart transplanted 1993    due to cardiomyopathy    History of hyperparathyroidism; Hyperparathyroidism, secondary renal     PT DENIES     Hypertension     Immune disorder     anti rejection meds    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    Impaired mobility 07/28/2022    Iron deficiency anemia 08/15/2017    Kidney stones     passed per pt    Obesity     Other osteoporosis without current pathological fracture 08/30/2019    Parotitis, acute 9/19/2023    Pharyngitis 1/9/2019    Severe sepsis 11/22/2021    Shingles 2003 approx    left leg    Subclinical hypothyroidism 06/16/2023    Thrombocytopenia, unspecified 11/29/2021    Trouble in sleeping     Urinary incontinence      Past Surgical History:   Procedure Laterality Date    BLADDER SURGERY  2015 approx    mesh - Dr Everett then 2nd reconstructive sx Dr Onofre    BREAST BIOPSY Bilateral     NEGATIVE    BREAST BIOPSY Right 10/31/2022    benign    BREAST LUMPECTOMY Left 2021    BREAST SURGERY Left 09/28/2015    Bx - benign    BREAST SURGERY Right 12/2015    Bx benign    CARDIAC PACEMAKER REMOVAL Left 06/26/2014    Pacer defirillator removed. Put in 1993 aat time of heart transplant    CARPAL TUNNEL RELEASE Left 03/03/2015    Dr. Hall    COLONOSCOPY N/A 02/25/2021    Procedure: COLONOSCOPY;  Surgeon: Freida Ramirez MD;  Location: [facility] ENDO;  Service: Endoscopy;  Laterality: N/A;    CYSTOCELE REPAIR      Twice with mesh removal    EPIDURAL STEROID INJECTION INTO CERVICAL SPINE N/A 02/02/2023    Procedure: T11/T12 IL HELLEN;  Surgeon: Jassi Pierre MD;  [facility];  Service: Pain Management;  Laterality: N/A;    HEART TRANSPLANT  1993    HERNIA REPAIR Right 1971 approx    Inguinal    HYSTERECTOMY  1983    vag hyst /LSO     INCISION AND DRAINAGE OF ABSCESS Left 12/24/2021    Procedure: INCISION AND DRAINAGE, ABSCESS;  Surgeon: oJseph Longo MD;  Location: [facility] OR;  Service: General;  Laterality: Left;    INJECTION OF ANESTHETIC AGENT AROUND MEDIAL BRANCH NERVES INNERVATING LUMBAR FACET JOINT Right 10/19/2022    Procedure: Right L4/L5 and L5/S1 MBB;  Surgeon:  Jassi Pierre MD;  Location: [facility] PAIN MGT;  Service: Pain Management;  Laterality: Right;    INJECTION OF ANESTHETIC AGENT AROUND MEDIAL BRANCH NERVES INNERVATING LUMBAR FACET JOINT Right 11/09/2022    Procedure: Right L4/L5 and L5/S1 MBB;  Surgeon: Jassi Pierre MD;  Location: [facility] PAIN MGT;  Service: Pain Management;  Laterality: Right;    INJECTION OF ANESTHETIC AGENT INTO SACROILIAC JOINT Right 08/22/2022    Procedure: Right SIJ Injection Right L5/S1 Facte Injection;  Surgeon: Jassi Pierre MD;  Location: [facility] PAIN MGT;  Service: Pain Management;  Laterality: Right;    INSERTION OF TUNNELED CENTRAL VENOUS CATHETER (CVC) WITH SUBCUTANEOUS PORT N/A 11/09/2021    Procedure: DELNYJHPN-DTPA-M-CATH;  Surgeon: Christoph Douglas MD;  Location: [facility] OR;  Service: General;  Laterality: N/A;    RADIOFREQUENCY THERMOCOAGULATION Right 12/07/2022    Procedure: Right L4/L5 and L5/S1 Lumbar RFA;  Surgeon: Jassi Pierre MD;  Location: [facility] PAIN MGT;  Service: Pain Management;  Laterality: Right;    REMOVAL OF VASCULAR ACCESS PORT      ROBOT-ASSISTED LAPAROSCOPIC ABDOMINAL SACROCOLPOPEXY N/A 8/10/2023    Procedure: ROBOTIC SACROCOLPOPEXY, ABDOMEN;  Surgeon: PRANAY Villalobos MD;  Location: [facility] OR;  Service: OB/GYN;  Laterality: N/A;    ROBOT-ASSISTED LAPAROSCOPIC OOPHORECTOMY Right 8/10/2023    Procedure: ROBOTIC OOPHORECTOMY;  Surgeon: PRANAY Villalobos MD;  Location: [facility] OR;  Service: OB/GYN;  Laterality: Right;    SENTINEL LYMPH NODE BIOPSY Left 10/12/2021    Procedure: BIOPSY, LYMPH NODE, SENTINEL;  Surgeon: Christoph Doulgas MD;  Location: [facility] OR;  Service: General;  Laterality: Left;    TOE SURGERY      XI ROBOTIC URETHROPEXY N/A 8/10/2023    Procedure: XI ROBOTIC URETHROPEXY;  Surgeon: PRANAY Villalobos MD;  Location: [facility] OR;  Service: OB/GYN;  Laterality: N/A;     Family History   Problem Relation Age of Onset    Cancer Mother 38        breast    Breast cancer Mother     Breast cancer Maternal  Grandmother     Heart disease Maternal Grandmother     Hypertension Son     Cataracts Cousin     Diabetes Neg Hx     Stroke Neg Hx     Kidney disease Neg Hx     Asthma Neg Hx     COPD Neg Hx     Melanoma Neg Hx     Hyperlipidemia Neg Hx      Patient Active Problem List   Diagnosis    Heart transplanted    CKD (chronic kidney disease) stage 4, GFR 15-29 ml/min    Gastroesophageal reflux disease without esophagitis    Bronchitis    Essential hypertension    Aortic atherosclerosis    Secondary hyperparathyroidism, renal    Hyperkalemia    Obesity (BMI 30.0-34.9)    Other osteoporosis without current pathological fracture    Immunocompromised patient    Ductal carcinoma in situ (DCIS) of left breast    Ulnar neuropathy of left upper extremity    Chronic diastolic (congestive) heart failure    Anemia associated with stage 4 chronic renal failure    Secondary and unspecified malignant neoplasm of axilla and upper limb lymph nodes    Other hyperlipidemia    DDD (degenerative disc disease), thoracolumbar    Ventral hernia without obstruction or gangrene    Acquired hypothyroidism    COVID-19 in immunocompromised patient     Review of patient's allergies indicates:   Allergen Reactions    Lisinopril Swelling and Rash    Augmentin [amoxicillin-pot clavulanate] Diarrhea    Zyvox [linezolid] Nausea And Vomiting       Medications:  Current Outpatient Medications on File Prior to Visit   Medication Sig Dispense Refill    biotin 10,000 mcg Cap Take 1 tablet by mouth once daily.      cycloSPORINE modified, NEORAL, (NEORAL) 25 MG capsule Take 3 capsules (75 mg total) by mouth 2 (two) times daily. 540 capsule 1    ergocalciferol (VITAMIN D2) 50,000 unit Cap Take 50,000 Units by mouth every 7 days.      EVENING PRIMROSE OIL ORAL Take 1,000 mg by mouth once daily.      fluticasone propionate (FLONASE) 50 mcg/actuation nasal spray 2 sprays (100 mcg total) by Each Nostril route once daily. 16 g 1    furosemide (LASIX) 20 MG tablet TAKE 1  TABLET EVERY DAY 90 tablet 3    guaiFENesin (MUCINEX) 600 mg 12 hr tablet Take 1,200 mg by mouth as needed.      hydrALAZINE (APRESOLINE) 50 MG tablet Take 1 tablet (50 mg total) by mouth every 8 (eight) hours. 270 tablet 3    loratadine (CLARITIN) 10 mg tablet Take 1 tablet (10 mg total) by mouth once daily. 30 tablet 1    multivitamin capsule Take 1 capsule by mouth once daily.      NIFEdipine (PROCARDIA-XL) 30 MG (OSM) 24 hr tablet Take 1 tablet (30 mg total) by mouth once daily. 30 tablet 11    pantoprazole (PROTONIX) 20 MG tablet Take 1 tablet (20 mg total) by mouth once daily. 30 tablet 11    patiromer calcium sorbitex (VELTASSA) 8.4 gram PwPk Take 1 packet (8.4 g total) by mouth every other day. 15 packet 11    polyethylene glycol (GLYCOLAX) 17 gram/dose powder Take 17 g by mouth once daily.      pregabalin (LYRICA) 50 MG capsule Take 1 capsule (50 mg total) by mouth 2 (two) times daily. 180 capsule 1    psyllium husk (METAMUCIL ORAL) Take 17 g by mouth once daily.      RETACRIT 20,000 unit/mL injection       solifenacin (VESICARE) 10 MG tablet Take 1 tablet (10 mg total) by mouth once daily. 90 tablet 0    temazepam (RESTORIL) 30 mg capsule Take 1 capsule (30 mg total) by mouth nightly as needed for Insomnia. 30 capsule 5    UNABLE TO FIND medication name: Stool softener (Ducolax) Laxative      vitamin E 400 UNIT capsule Take 400 Units by mouth once daily.      zolpidem (AMBIEN) 5 MG Tab Take 1 tablet (5 mg total) by mouth nightly as needed (insomnia). 90 tablet 0    albuterol (VENTOLIN HFA) 90 mcg/actuation inhaler Inhale 2 puffs into the lungs every 6 (six) hours as needed for Wheezing or Shortness of Breath. Rescue 18 g 1    calcitonin, salmon, (FORTICAL) 200 unit/actuation nasal spray 1 spray by Nasal route once daily. 3 each 3    carvediloL (COREG) 6.25 MG tablet Take 1 tablet (6.25 mg total) by mouth 2 (two) times daily with meals. 180 tablet 3     Current Facility-Administered Medications on File  Prior to Visit   Medication Dose Route Frequency Provider Last Rate Last Admin    acetaminophen tablet 1,000 mg  1,000 mg Oral On Call Procedure PRANAY Villalobos MD        famotidine tablet 20 mg  20 mg Oral On Call Procedure PRANAY Villalobos MD           Medications have been reviewed and reconciled with patient at visit today.      Exam:  Vitals:    02/26/24 1450   BP: 136/72   Pulse: 96   Temp: 99.3 °F (37.4 °C)     Weight: 72.2 kg (159 lb 2.8 oz)   Body mass index is 29.11 kg/m².      BP Readings from Last 3 Encounters:   02/26/24 136/72   02/08/24 138/68   02/05/24 132/70     Wt Readings from Last 3 Encounters:   02/26/24 1450 72.2 kg (159 lb 2.8 oz)   02/05/24 1336 72.8 kg (160 lb 9.7 oz)   01/11/24 1330 72.4 kg (159 lb 11.6 oz)        REVIEW OF SYSTEMS  Review of Systems   Constitutional:  Positive for chills, fatigue and fever.   HENT:  Positive for congestion, postnasal drip, rhinorrhea and sore throat.    Respiratory:  Positive for cough. Negative for shortness of breath.    Cardiovascular:  Negative for chest pain and palpitations.   Gastrointestinal:  Negative for abdominal pain, diarrhea, nausea and vomiting.   Genitourinary:  Negative for dysuria and frequency.   Musculoskeletal:  Positive for myalgias. Negative for arthralgias.   Skin:  Negative for rash and wound.   Neurological:  Negative for dizziness, weakness, light-headedness and headaches.   Hematological:  Negative for adenopathy.   Psychiatric/Behavioral:  Negative for dysphoric mood and sleep disturbance. The patient is not nervous/anxious.         Physical Exam  Vitals reviewed.   Constitutional:       General: She is not in acute distress.     Appearance: She is ill-appearing.   HENT:      Head: Normocephalic and atraumatic.      Right Ear: Tympanic membrane normal.      Left Ear: Tympanic membrane normal.      Nose: Congestion and rhinorrhea present.      Right Sinus: No maxillary sinus tenderness or frontal sinus tenderness.       Left Sinus: No maxillary sinus tenderness or frontal sinus tenderness.      Mouth/Throat:      Mouth: Mucous membranes are moist.      Pharynx: Posterior oropharyngeal erythema present. No pharyngeal swelling or oropharyngeal exudate.      Tonsils: No tonsillar exudate.   Eyes:      General: No scleral icterus.     Conjunctiva/sclera: Conjunctivae normal.   Cardiovascular:      Rate and Rhythm: Normal rate and regular rhythm.   Pulmonary:      Effort: Pulmonary effort is normal. No respiratory distress.      Breath sounds: Normal breath sounds. No wheezing.   Musculoskeletal:      Cervical back: Normal range of motion and neck supple.      Right lower leg: No edema.      Left lower leg: No edema.   Lymphadenopathy:      Cervical: No cervical adenopathy.   Skin:     General: Skin is warm and dry.   Neurological:      Mental Status: She is alert and oriented to person, place, and time. Mental status is at baseline.   Psychiatric:         Mood and Affect: Mood normal.         Thought Content: Thought content normal.          Laboratory Reviewed:     Lab Results   Component Value Date    WBC 3.42 (L) 02/08/2024    HGB 9.1 (L) 02/08/2024    HCT 28.7 (L) 02/08/2024     02/08/2024    CHOL 156 09/19/2023    TRIG 176 (H) 09/19/2023    HDL 34 (L) 09/19/2023    ALT 19 12/27/2023    AST 32 12/27/2023     12/27/2023    K 4.4 12/27/2023     12/27/2023    CREATININE 2.2 (H) 12/27/2023    BUN 26 (H) 12/27/2023    CO2 24 12/27/2023    TSH 7.542 (H) 11/14/2023    PSA <0.1 05/27/2008    INR 1.0 11/22/2021    HGBA1C 5.1 03/08/2023       Screening or Prevention Patient's value Goal Complete/Controlled?   HgA1C Testing and Control   Lab Results   Component Value Date    HGBA1C 5.1 03/08/2023      Annually/Less than 8% Yes   Lipid profile : 09/19/2023 Annually Yes   LDL control Lab Results   Component Value Date    LDLCALC 86.8 09/19/2023    Annually/Less than 100 mg/dl  Yes   Nephropathy screening No results found for:  ""LABMICR"  Lab Results   Component Value Date    PROTEINUA Negative 11/15/2023    Annually Yes   Blood pressure BP Readings from Last 1 Encounters:   02/26/24 136/72    Less than 140/90 Yes   Dilated retinal exam Most Recent Eye Exam Date: Not Found Annually Yes   Foot exam   Most Recent Foot Exam Date: Not Found Annually Yes       Health Maintenance  Health Maintenance Topics with due status: Not Due       Topic Last Completion Date    TETANUS VACCINE 09/09/2020    Colorectal Cancer Screening 02/25/2021    DEXA Scan 01/25/2023    Hemoglobin A1c (Diabetic Prevention Screening) 03/08/2023    Lipid Panel 09/19/2023     Health Maintenance Due   Topic Date Due    RSV Vaccine (Age 60+ and Pregnant patients) (1 - 1-dose 60+ series) Never done    COVID-19 Vaccine (4 - 2023-24 season) 09/01/2023    Mammogram  04/10/2024               -Patient's lab results were reviewed and discussed with patient  -Treatment options and alternatives were discussed with the patient. Patient expressed understanding. Patient was given the opportunity to ask questions and be an active participant in their medical care. Patient had no further questions or concerns at this time.         Future Appointments   Date Time Provider Department Center   [facility]   3/8/2024  1:15 PM INJECTION 1, [facility] INFUSION [facility] IN[facility]   4/8/2024 12:10 PM [place] [facility] [place]   4/8/2024  1:15 PM INJECTION 1, BR[facility] INFUSION [facility] INFSN [facility]   4/10/2024  9:00 AM Luz Dickson NP [facility]   4/10/2024  2:00 PM Elis Wick MD [facility]   4/16/2024  2:00 PM Sri Gallagher NP ON UROLOGY [facility] Medical C   4/18/2024  1:00 PM ONLH MAMMO1 ON[facility] MAMMO [facility]   4/18/2024  2:00 PM Kristine Delgado NP [facility] BRESUR [facility]   7/3/2024  1:10 PM LABORATORY, [facility] INDRA ONL LAB [facility]   7/3/2024  1:30 PM ONLC BMD1 ONLH DEXABMD  Medical C   7/11/2024 12:30 PM [facility]          After visit summary printed and given to patient upon discharge.  Patient goals and care plan are included in After visit summary.      The following issues were discussed: The primary encounter diagnosis was Fever, unspecified fever cause. Diagnoses of Insomnia, unspecified type and COVID-19 in immunocompromised patient were also pertinent to this visit.    Health maintenance needs, recent test results and goals of care discussed with pt and questions answered.           ROSSANA Goldsmith, NP-C  [facility] [address] [place] [address]

## 2024-02-26 NOTE — PATIENT INSTRUCTIONS
Tylenol for fever    Flonase for nasal congestion  Nasal saline to irrigate nose and help with congestion    Loratadine to help dry the post nasal drip  Benadryl at night to help dry post nasal drip (don't use cough syrup at same time as Benadryl)      Cough syrup for  nighttime use    Tessalon Perle for daytime use

## 2024-02-27 ENCOUNTER — TELEPHONE (OUTPATIENT)
Dept: PRIMARY CARE CLINIC | Facility: CLINIC | Age: 69
End: 2024-02-27
Payer: MEDICARE

## 2024-02-27 NOTE — TELEPHONE ENCOUNTER
Pt notified of transplant coordinators recommendations, pt verbalized understanding.  Advised pt to call us with any concerns. Pt states that she is still coughing a lot but denies any sob, chest pain  or dizziness.

## 2024-02-27 NOTE — TELEPHONE ENCOUNTER
Hx of heart transplant 30 years ago. On immunosuppression therapy.    COVID + with harsh cough.    Transplant coordinator recommended isolation x 21 days and treat symptoms    No paxlovid secondary to cyclosporine.

## 2024-02-27 NOTE — TELEPHONE ENCOUNTER
Spoke with pt this morning, reviewed her S/S of covid and when to go to Hospital  Pt with low grade fever 99.0, Cough and reviewed medications that she received for cough and explained that she can take Mucinex.

## 2024-02-27 NOTE — TELEPHONE ENCOUNTER
Spoke with pt and let her know per Dr. Wick, that it was okay to take the Ambien 5 mg if she needed it.     SJ

## 2024-02-28 ENCOUNTER — TELEPHONE (OUTPATIENT)
Dept: PRIMARY CARE CLINIC | Facility: CLINIC | Age: 69
End: 2024-02-28
Payer: MEDICARE

## 2024-02-28 NOTE — TELEPHONE ENCOUNTER
----- Message from Chelsea Collado NP sent at 2/28/2024  7:37 AM CST -----  Regarding: COVID  Please call patient to check her status. COVID with cough.    Chelsea Sadler, NP

## 2024-02-28 NOTE — TELEPHONE ENCOUNTER
Called pt- states that she did get some rest last night. States she is starting to feel better.  Still with cough and congestion but not as bad. Advised pt to contact clinic with any concerns.

## 2024-02-28 NOTE — ASSESSMENT & PLAN NOTE
Consulted with transplant coordinator  Paxlovid is contraindicated due to immunosuppression med (cyclosporine)  Rest  Drink plenty of clear fluids  Nasal saline spray to clear nasal drainage and help with nasal congestion  Zyrtec or Claritin to help dry mucus and post nasal drip  Mucinex or Mucinex DM for cough and chest congestion  Tylenol for fever, headache and body aches  Warm salt water gargles for throat comfort  Chloraseptic spray or lozenges for throat comfort  Isolate for 21 days  If SOB develops present to the ED for evaluation

## 2024-03-04 ENCOUNTER — TELEPHONE (OUTPATIENT)
Dept: HEMATOLOGY/ONCOLOGY | Facility: CLINIC | Age: 69
End: 2024-03-04
Payer: MEDICARE

## 2024-03-04 NOTE — TELEPHONE ENCOUNTER
----- Message from Brynn Hobbs sent at 3/4/2024 12:21 PM CST -----  Contact: Nadia  .Patient is calling to speak with the nurse regarding appt  . Reports tested positive for covid and needing to reschedule  . Please give patient a call back at .[phone]

## 2024-03-05 DIAGNOSIS — G47.00 INSOMNIA, UNSPECIFIED TYPE: ICD-10-CM

## 2024-03-07 RX ORDER — TEMAZEPAM 30 MG/1
30 CAPSULE ORAL NIGHTLY PRN
Qty: 30 CAPSULE | Refills: 0 | Status: SHIPPED | OUTPATIENT
Start: 2024-03-07 | End: 2024-04-15

## 2024-03-14 ENCOUNTER — LAB VISIT (OUTPATIENT)
Dept: LAB | Facility: HOSPITAL | Age: 69
End: 2024-03-14
Attending: NURSE PRACTITIONER
Payer: MEDICARE

## 2024-03-14 ENCOUNTER — OFFICE VISIT (OUTPATIENT)
Dept: PRIMARY CARE CLINIC | Facility: CLINIC | Age: 69
End: 2024-03-14
Payer: MEDICARE

## 2024-03-14 VITALS
TEMPERATURE: 99 F | SYSTOLIC BLOOD PRESSURE: 120 MMHG | WEIGHT: 156 LBS | DIASTOLIC BLOOD PRESSURE: 64 MMHG | BODY MASS INDEX: 28.71 KG/M2 | HEART RATE: 86 BPM | HEIGHT: 62 IN | OXYGEN SATURATION: 97 %

## 2024-03-14 DIAGNOSIS — D63.1 ANEMIA ASSOCIATED WITH STAGE 4 CHRONIC RENAL FAILURE: Chronic | ICD-10-CM

## 2024-03-14 DIAGNOSIS — N18.4 CKD (CHRONIC KIDNEY DISEASE) STAGE 4, GFR 15-29 ML/MIN: Chronic | ICD-10-CM

## 2024-03-14 DIAGNOSIS — D84.9 COVID-19 IN IMMUNOCOMPROMISED PATIENT: ICD-10-CM

## 2024-03-14 DIAGNOSIS — J18.9 PNEUMONIA DUE TO INFECTIOUS ORGANISM, UNSPECIFIED LATERALITY, UNSPECIFIED PART OF LUNG: ICD-10-CM

## 2024-03-14 DIAGNOSIS — U07.1 COVID-19 IN IMMUNOCOMPROMISED PATIENT: ICD-10-CM

## 2024-03-14 DIAGNOSIS — R30.0 DYSURIA: ICD-10-CM

## 2024-03-14 DIAGNOSIS — R30.0 DYSURIA: Primary | ICD-10-CM

## 2024-03-14 DIAGNOSIS — N18.4 ANEMIA ASSOCIATED WITH STAGE 4 CHRONIC RENAL FAILURE: Chronic | ICD-10-CM

## 2024-03-14 LAB
BACTERIA #/AREA URNS HPF: ABNORMAL /HPF
BILIRUB UR QL STRIP: NEGATIVE
CLARITY UR: ABNORMAL
COLOR UR: YELLOW
GLUCOSE UR QL STRIP: NEGATIVE
HGB UR QL STRIP: NEGATIVE
KETONES UR QL STRIP: NEGATIVE
LEUKOCYTE ESTERASE UR QL STRIP: ABNORMAL
MICROSCOPIC COMMENT: ABNORMAL
NITRITE UR QL STRIP: NEGATIVE
PH UR STRIP: 6 [PH] (ref 5–8)
PROT UR QL STRIP: ABNORMAL
SP GR UR STRIP: 1.01 (ref 1–1.03)
URN SPEC COLLECT METH UR: ABNORMAL
UROBILINOGEN UR STRIP-ACNC: NEGATIVE EU/DL
WBC #/AREA URNS HPF: 15 /HPF (ref 0–5)
WBC CLUMPS URNS QL MICRO: ABNORMAL

## 2024-03-14 PROCEDURE — 87088 URINE BACTERIA CULTURE: CPT | Mod: HCNC | Performed by: NURSE PRACTITIONER

## 2024-03-14 PROCEDURE — 87186 SC STD MICRODIL/AGAR DIL: CPT | Mod: HCNC | Performed by: NURSE PRACTITIONER

## 2024-03-14 PROCEDURE — 87077 CULTURE AEROBIC IDENTIFY: CPT | Mod: HCNC | Performed by: NURSE PRACTITIONER

## 2024-03-14 PROCEDURE — 81003 URINALYSIS AUTO W/O SCOPE: CPT | Mod: HCNC | Performed by: NURSE PRACTITIONER

## 2024-03-14 PROCEDURE — 1159F MED LIST DOCD IN RCRD: CPT | Mod: HCNC,CPTII,S$GLB, | Performed by: NURSE PRACTITIONER

## 2024-03-14 PROCEDURE — 99213 OFFICE O/P EST LOW 20 MIN: CPT | Mod: HCNC,S$GLB,, | Performed by: NURSE PRACTITIONER

## 2024-03-14 PROCEDURE — 99999 PR PBB SHADOW E&M-EST. PATIENT-LVL III: CPT | Mod: PBBFAC,HCNC,, | Performed by: NURSE PRACTITIONER

## 2024-03-14 PROCEDURE — 3078F DIAST BP <80 MM HG: CPT | Mod: HCNC,CPTII,S$GLB, | Performed by: NURSE PRACTITIONER

## 2024-03-14 PROCEDURE — 81000 URINALYSIS NONAUTO W/SCOPE: CPT | Mod: HCNC | Performed by: NURSE PRACTITIONER

## 2024-03-14 PROCEDURE — 3288F FALL RISK ASSESSMENT DOCD: CPT | Mod: HCNC,CPTII,S$GLB, | Performed by: NURSE PRACTITIONER

## 2024-03-14 PROCEDURE — 1157F ADVNC CARE PLAN IN RCRD: CPT | Mod: HCNC,CPTII,S$GLB, | Performed by: NURSE PRACTITIONER

## 2024-03-14 PROCEDURE — 3008F BODY MASS INDEX DOCD: CPT | Mod: HCNC,CPTII,S$GLB, | Performed by: NURSE PRACTITIONER

## 2024-03-14 PROCEDURE — 3074F SYST BP LT 130 MM HG: CPT | Mod: HCNC,CPTII,S$GLB, | Performed by: NURSE PRACTITIONER

## 2024-03-14 PROCEDURE — 87086 URINE CULTURE/COLONY COUNT: CPT | Mod: HCNC | Performed by: NURSE PRACTITIONER

## 2024-03-14 PROCEDURE — 1101F PT FALLS ASSESS-DOCD LE1/YR: CPT | Mod: HCNC,CPTII,S$GLB, | Performed by: NURSE PRACTITIONER

## 2024-03-14 RX ORDER — CEFDINIR 300 MG/1
300 CAPSULE ORAL DAILY
Qty: 10 CAPSULE | Refills: 0 | Status: SHIPPED | OUTPATIENT
Start: 2024-03-14 | End: 2024-03-14

## 2024-03-14 RX ORDER — DOXYCYCLINE 100 MG/1
100 CAPSULE ORAL EVERY 12 HOURS
Qty: 14 CAPSULE | Refills: 0 | Status: SHIPPED | OUTPATIENT
Start: 2024-03-14 | End: 2024-03-14

## 2024-03-14 RX ORDER — DOXYCYCLINE 100 MG/1
100 CAPSULE ORAL EVERY 12 HOURS
Qty: 14 CAPSULE | Refills: 0 | Status: SHIPPED | OUTPATIENT
Start: 2024-03-14 | End: 2024-03-21

## 2024-03-14 RX ORDER — CEFDINIR 300 MG/1
300 CAPSULE ORAL DAILY
Qty: 10 CAPSULE | Refills: 0 | Status: SHIPPED | OUTPATIENT
Start: 2024-03-14 | End: 2024-03-24

## 2024-03-14 NOTE — PROGRESS NOTES
Nadia Damon  03/14/2024  [id number]    Elis Wick MD  Patient Care Team:  Elis Wick MD as PCP - General (Internal Medicine)  Miguel Soni Jr., MD as Consulting Physician (Vascular Surgery)  Ike King MD as Consulting Physician (Cardiology)  Courtney Tubbs MD as Consulting Physician (Cardiology)  Carter Crawford MD as Consulting Physician (Nephrology)  Paxton Vasques OD as Consulting Physician (Optometry)  PRANAY Villalobos MD as Obstetrician (Obstetrics)  Parker Mccarthy IV, MD (Urology)  Ike King MD as Consulting Physician (Cardiology)  Jose Roland MD as Consulting Physician (Dermatology)  Prasanth Johnson MD as Consulting Physician (Rheumatology)  Elis Wick MD as Physician (Internal Medicine)  Lexi Bianchi LCSW as  (Hematology and Oncology)  Candace Saleh LMSW as  (Hematology and Oncology)  Kelsie Burk PA-C as Physician Assistant (Hematology and Oncology)      [facility] Note      Chief Complaint:  Chief Complaint   Patient presents with    Follow-up     Pt is here for a follow up on Covid.        History of Present Illness:  HPI    F/U COVID. Dx with COVID 2/26/24. Lots of fatigue, chills, congestion, cough.   Chest congestion, productive cough with yellow sputum. Some dyspnea when she is very active. No fever. Some pleurisy.     Urine is cloudy.     Review of Systems   Constitutional:  Negative for activity change and fever.   Respiratory:  Positive for cough. Negative for shortness of breath and wheezing.    Cardiovascular:  Positive for chest pain. Negative for palpitations and leg swelling.   Gastrointestinal:  Negative for abdominal pain, diarrhea, nausea and vomiting.   Genitourinary:  Negative for difficulty urinating and dysuria.   Musculoskeletal:  Negative for arthralgias and myalgias.   Neurological:  Negative for dizziness.         The following were reviewed: Active problem list, medication  list, allergies, family history, social history, and Health Maintenance.       Medications:  Current Outpatient Medications on File Prior to Visit   Medication Sig Dispense Refill    benzonatate (TESSALON) 100 MG capsule Take 1 capsule by mouth 3 times daily 30 capsule 0    biotin 10,000 mcg Cap Take 1 tablet by mouth once daily.      cycloSPORINE modified, NEORAL, (NEORAL) 25 MG capsule Take 3 capsules (75 mg total) by mouth 2 (two) times daily. 540 capsule 1    ergocalciferol (VITAMIN D2) 50,000 unit Cap Take 50,000 Units by mouth every 7 days.      EVENING PRIMROSE OIL ORAL Take 1,000 mg by mouth once daily.      fluticasone propionate (FLONASE) 50 mcg/actuation nasal spray 2 sprays (100 mcg total) by Each Nostril route once daily. 16 g 1    furosemide (LASIX) 20 MG tablet TAKE 1 TABLET EVERY DAY 90 tablet 3    guaiFENesin-codeine 100-10 mg/5 ml (TUSSI-ORGANIDIN NR)  mg/5 mL syrup Take 5ml by mouth nightly as needed for cough 120 mL 0    hydrALAZINE (APRESOLINE) 50 MG tablet Take 1 tablet (50 mg total) by mouth every 8 (eight) hours. 270 tablet 3    hydrocortisone 1 % cream APPLY TOPICALLY 2 (TWO) TIMES DAILY. 35 g 3    LIDOcaine (LIDODERM) 5 % Place 1 patch onto the skin daily as needed (back pain). Remove & Discard patch within 12 hours or as directed by MD 30 patch 1    loratadine (CLARITIN) 10 mg tablet Take 1 tablet (10 mg total) by mouth once daily. 30 tablet 1    multivitamin capsule Take 1 capsule by mouth once daily.      NIFEdipine (PROCARDIA-XL) 30 MG (OSM) 24 hr tablet Take 1 tablet (30 mg total) by mouth once daily. 30 tablet 11    pantoprazole (PROTONIX) 20 MG tablet Take 1 tablet (20 mg total) by mouth once daily. 30 tablet 11    patiromer calcium sorbitex (VELTASSA) 8.4 gram PwPk Take 1 packet (8.4 g total) by mouth every other day. 15 packet 11    polyethylene glycol (GLYCOLAX) 17 gram/dose powder Take 17 g by mouth once daily.      pregabalin (LYRICA) 50 MG capsule Take 1 capsule (50 mg  total) by mouth 2 (two) times daily. 180 capsule 1    psyllium husk (METAMUCIL ORAL) Take 17 g by mouth once daily.      RETACRIT 20,000 unit/mL injection       solifenacin (VESICARE) 10 MG tablet Take 1 tablet (10 mg total) by mouth once daily. 90 tablet 0    temazepam (RESTORIL) 30 mg capsule Take 1 capsule (30 mg total) by mouth nightly as needed for Insomnia. 30 capsule 0    UNABLE TO FIND medication name: Stool softener (Ducolax) Laxative      vitamin E 400 UNIT capsule Take 400 Units by mouth once daily.      zolpidem (AMBIEN) 5 MG Tab Take 1 tablet (5 mg total) by mouth nightly as needed (insomnia). 90 tablet 0    albuterol (VENTOLIN HFA) 90 mcg/actuation inhaler Inhale 2 puffs into the lungs every 6 (six) hours as needed for Wheezing or Shortness of Breath. Rescue 18 g 1    calcitonin, salmon, (FORTICAL) 200 unit/actuation nasal spray 1 spray by Nasal route once daily. 3 each 3    carvediloL (COREG) 6.25 MG tablet Take 1 tablet (6.25 mg total) by mouth 2 (two) times daily with meals. 180 tablet 3    guaiFENesin (MUCINEX) 600 mg 12 hr tablet Take 1,200 mg by mouth as needed.       Current Facility-Administered Medications on File Prior to Visit   Medication Dose Route Frequency Provider Last Rate Last Admin    acetaminophen tablet 1,000 mg  1,000 mg Oral On Call Procedure PRANAY Villalobos MD        famotidine tablet 20 mg  20 mg Oral On Call Procedure PRANAY Villalobos MD           Medications have been reviewed and reconciled with patient at visit today.    Barriers to medications present (no )    Fall since last office visit (no )      Exam:  Vitals:    03/14/24 1400   BP: 120/64   Pulse: 86   Temp: 98.6 °F (37 °C)     Weight: 70.8 kg (155 lb 15.6 oz)   Body mass index is 28.53 kg/m².      BP Readings from Last 3 Encounters:   03/14/24 120/64   02/26/24 136/72   02/08/24 138/68     Wt Readings from Last 3 Encounters:   03/14/24 1400 70.8 kg (155 lb 15.6 oz)   02/26/24 1450 72.2 kg (159 lb 2.8 oz)    02/05/24 1336 72.8 kg (160 lb 9.7 oz)            Physical Exam  Constitutional:       General: She is not in acute distress.     Appearance: She is not ill-appearing.   HENT:      Nose: Nose normal.      Mouth/Throat:      Mouth: Mucous membranes are moist.   Eyes:      General: No scleral icterus.  Cardiovascular:      Rate and Rhythm: Normal rate and regular rhythm.      Heart sounds: Murmur heard.   Pulmonary:      Effort: Pulmonary effort is normal.      Breath sounds: No wheezing, rhonchi or rales.      Comments: Diminished bilateral bases  Chest:      Chest wall: No tenderness.   Abdominal:      General: There is no distension.      Palpations: Abdomen is soft.      Tenderness: There is no abdominal tenderness.   Musculoskeletal:         General: No swelling.      Cervical back: Neck supple.      Right lower leg: No edema.      Left lower leg: No edema.   Lymphadenopathy:      Cervical: No cervical adenopathy.   Skin:     General: Skin is warm and dry.   Neurological:      Mental Status: She is alert. Mental status is at baseline.   Psychiatric:         Mood and Affect: Mood normal.         Behavior: Behavior normal.         Laboratory Reviewed: (Yes)  Lab Results   Component Value Date    WBC 3.42 (L) 02/08/2024    HGB 9.1 (L) 02/08/2024    HCT 28.7 (L) 02/08/2024     02/08/2024    CHOL 156 09/19/2023    TRIG 176 (H) 09/19/2023    HDL 34 (L) 09/19/2023    ALT 19 12/27/2023    AST 32 12/27/2023     12/27/2023    K 4.4 12/27/2023     12/27/2023    CREATININE 2.2 (H) 12/27/2023    BUN 26 (H) 12/27/2023    CO2 24 12/27/2023    TSH 7.542 (H) 11/14/2023    PSA <0.1 05/27/2008    INR 1.0 11/22/2021    HGBA1C 5.1 03/08/2023           Health Maintenance  Health Maintenance Topics with due status: Not Due       Topic Last Completion Date    TETANUS VACCINE 09/09/2020    Colorectal Cancer Screening 02/25/2021    DEXA Scan 01/25/2023    Hemoglobin A1c (Diabetic Prevention Screening) 03/08/2023     Lipid Panel 09/19/2023     Health Maintenance Due   Topic Date Due    RSV Vaccine (Age 60+ and Pregnant patients) (1 - 1-dose 60+ series) Never done    COVID-19 Vaccine (4 - 2023-24 season) 09/01/2023    Mammogram  04/10/2024         Assessment:  Problem List Items Addressed This Visit          Pulmonary    Pneumonia due to infectious organism     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   Rx cefdinir + doxycycline.          Relevant Medications    doxycycline (VIBRAMYCIN) 100 MG Cap    cefdinir (OMNICEF) 300 MG capsule       Renal/    CKD (chronic kidney disease) stage 4, GFR 15-29 ml/min (Chronic)    Relevant Orders    RENAL FUNCTION PANEL       ID    COVID-19 in immunocompromised patient     Repeat COVID testing today, pt's request, negative.             Oncology    Anemia associated with stage 4 chronic renal failure (Chronic)     Has appt Monday for lab + Epogen.           Other Visit Diagnoses       Dysuria    -  Primary    Relevant Orders    Urinalysis, Reflex to Urine Culture Urine, Clean Catch    Urinalysis, Reflex to Urine Culture Urine, Clean Catch              Plan:  Dysuria  -     Urinalysis, Reflex to Urine Culture Urine, Clean Catch  -     Urinalysis, Reflex to Urine Culture Urine, Clean Catch; Future; Expected date: 03/14/2024    CKD (chronic kidney disease) stage 4, GFR 15-29 ml/min  -     RENAL FUNCTION PANEL; Future; Expected date: 03/14/2024    Pneumonia due to infectious organism, unspecified laterality, unspecified part of lung  -     Discontinue: cefdinir (OMNICEF) 300 MG capsule; Take 1 capsule (300 mg total) by mouth once daily. for 10 days  Dispense: 10 capsule; Refill: 0  -     Discontinue: doxycycline (VIBRAMYCIN) 100 MG Cap; Take 1 capsule (100 mg total) by mouth every 12 (twelve) hours. for 7 days  Dispense: 14 capsule; Refill: 0  -     doxycycline (VIBRAMYCIN) 100 MG Cap; Take 1 capsule (100 mg total) by mouth every 12 (twelve) hours. for 7 days  Dispense: 14 capsule; Refill: 0  -      cefdinir (OMNICEF) 300 MG capsule; Take 1 capsule (300 mg total) by mouth once daily. for 10 days  Dispense: 10 capsule; Refill: 0    COVID-19 in immunocompromised patient    Anemia associated with stage 4 chronic renal failure      -Patient's lab results were reviewed and discussed with patient  -Treatment options and alternatives were discussed with the patient. Patient expressed understanding. Patient was given the opportunity to ask questions and be an active participant in their medical care. Patient had no further questions or concerns at this time.   -Documentation of patient's health and condition was obtained from family member who was present during visit.  -Patient is an overall moderate risk for health complications from their medical conditions.       Follow up: Follow up in about 1 month (around 4/14/2024).      After visit summary printed and given to patient upon discharge.  Patient goals and care plan are included in After visit summary.    Total medical decision making time was 29 min.  The following issues were discussed: The primary encounter diagnosis was Dysuria. Diagnoses of CKD (chronic kidney disease) stage 4, GFR 15-29 ml/min, Pneumonia due to infectious organism, unspecified laterality, unspecified part of lung, COVID-19 in immunocompromised patient, and Anemia associated with stage 4 chronic renal failure were also pertinent to this visit.    Health maintenance needs, recent test results and goals of care discussed with pt and questions answered.

## 2024-03-14 NOTE — LETTER
March 14, 2024    Nadia Damon  [email or web address]  [address]  [place] [address]             [facility] [place]  [facility]  [address]  [place] [address]  Phone: [phone]  Fax: [phone]   March 14, 2024     Patient: Nadia Damon   YOB: 1955   Date of Visit: 3/14/2024       To Whom it May Concern:    Nadia Damon was seen in my clinic on 3/14/2024. She should no longer be under quarantine for medical reasons. She has no evidence of COVID infection.     If you have any questions or concerns, please don't hesitate to call.    Sincerely,         Luz Dickson, NP

## 2024-03-14 NOTE — PATIENT INSTRUCTIONS
If you are feeling unwell, we'd like to be the first ones to know here at [facility]! Please give us a call. Same day appointments are our top priority to keep you well and out of the emergency rooms and hospitals. Call [phone] for our direct line. After hours advice is always available. Please call [phone] after hours to speak to the on-call team.

## 2024-03-18 ENCOUNTER — INFUSION (OUTPATIENT)
Dept: INFUSION THERAPY | Facility: HOSPITAL | Age: 69
End: 2024-03-18
Attending: INTERNAL MEDICINE
Payer: MEDICARE

## 2024-03-18 ENCOUNTER — LAB VISIT (OUTPATIENT)
Dept: LAB | Facility: HOSPITAL | Age: 69
End: 2024-03-18
Payer: MEDICARE

## 2024-03-18 VITALS
OXYGEN SATURATION: 100 % | TEMPERATURE: 98 F | RESPIRATION RATE: 18 BRPM | SYSTOLIC BLOOD PRESSURE: 129 MMHG | DIASTOLIC BLOOD PRESSURE: 64 MMHG | HEART RATE: 89 BPM

## 2024-03-18 DIAGNOSIS — N18.4 ANEMIA ASSOCIATED WITH STAGE 4 CHRONIC RENAL FAILURE: Chronic | ICD-10-CM

## 2024-03-18 DIAGNOSIS — Z94.1 HEART TRANSPLANTED: Primary | Chronic | ICD-10-CM

## 2024-03-18 DIAGNOSIS — N18.4 CKD (CHRONIC KIDNEY DISEASE) STAGE 4, GFR 15-29 ML/MIN: Chronic | ICD-10-CM

## 2024-03-18 DIAGNOSIS — D63.1 ANEMIA ASSOCIATED WITH STAGE 4 CHRONIC RENAL FAILURE: Chronic | ICD-10-CM

## 2024-03-18 DIAGNOSIS — N18.4 ANEMIA ASSOCIATED WITH STAGE 4 CHRONIC RENAL FAILURE: ICD-10-CM

## 2024-03-18 DIAGNOSIS — E87.5 HYPERKALEMIA: ICD-10-CM

## 2024-03-18 DIAGNOSIS — M81.8 OTHER OSTEOPOROSIS WITHOUT CURRENT PATHOLOGICAL FRACTURE: Primary | ICD-10-CM

## 2024-03-18 DIAGNOSIS — D63.1 ANEMIA ASSOCIATED WITH STAGE 4 CHRONIC RENAL FAILURE: ICD-10-CM

## 2024-03-18 LAB
ALBUMIN SERPL BCP-MCNC: 3.3 G/DL (ref 3.5–5.2)
ANION GAP SERPL CALC-SCNC: 9 MMOL/L (ref 8–16)
BACTERIA UR CULT: ABNORMAL
BASOPHILS # BLD AUTO: 0.01 K/UL (ref 0–0.2)
BASOPHILS NFR BLD: 0.4 % (ref 0–1.9)
BUN SERPL-MCNC: 55 MG/DL (ref 8–23)
CALCIUM SERPL-MCNC: 8.9 MG/DL (ref 8.7–10.5)
CHLORIDE SERPL-SCNC: 108 MMOL/L (ref 95–110)
CO2 SERPL-SCNC: 23 MMOL/L (ref 23–29)
CREAT SERPL-MCNC: 3.3 MG/DL (ref 0.5–1.4)
DIFFERENTIAL METHOD BLD: ABNORMAL
EOSINOPHIL # BLD AUTO: 0.1 K/UL (ref 0–0.5)
EOSINOPHIL NFR BLD: 3.5 % (ref 0–8)
ERYTHROCYTE [DISTWIDTH] IN BLOOD BY AUTOMATED COUNT: 15.4 % (ref 11.5–14.5)
EST. GFR  (NO RACE VARIABLE): 15 ML/MIN/1.73 M^2
GLUCOSE SERPL-MCNC: 79 MG/DL (ref 70–110)
HCT VFR BLD AUTO: 27.6 % (ref 37–48.5)
HGB BLD-MCNC: 8.6 G/DL (ref 12–16)
IMM GRANULOCYTES # BLD AUTO: 0.01 K/UL (ref 0–0.04)
IMM GRANULOCYTES NFR BLD AUTO: 0.4 % (ref 0–0.5)
LYMPHOCYTES # BLD AUTO: 0.9 K/UL (ref 1–4.8)
LYMPHOCYTES NFR BLD: 30.3 % (ref 18–48)
MCH RBC QN AUTO: 27.9 PG (ref 27–31)
MCHC RBC AUTO-ENTMCNC: 31.2 G/DL (ref 32–36)
MCV RBC AUTO: 90 FL (ref 82–98)
MONOCYTES # BLD AUTO: 0.5 K/UL (ref 0.3–1)
MONOCYTES NFR BLD: 15.8 % (ref 4–15)
NEUTROPHILS # BLD AUTO: 1.4 K/UL (ref 1.8–7.7)
NEUTROPHILS NFR BLD: 49.6 % (ref 38–73)
NRBC BLD-RTO: 0 /100 WBC
PHOSPHATE SERPL-MCNC: 4.4 MG/DL (ref 2.7–4.5)
PLATELET # BLD AUTO: 171 K/UL (ref 150–450)
PMV BLD AUTO: 9.5 FL (ref 9.2–12.9)
POTASSIUM SERPL-SCNC: 5.8 MMOL/L (ref 3.5–5.1)
RBC # BLD AUTO: 3.08 M/UL (ref 4–5.4)
SODIUM SERPL-SCNC: 140 MMOL/L (ref 136–145)
WBC # BLD AUTO: 2.84 K/UL (ref 3.9–12.7)

## 2024-03-18 PROCEDURE — 63600175 PHARM REV CODE 636 W HCPCS: Mod: JZ,EC,JG,HCNC

## 2024-03-18 PROCEDURE — 85025 COMPLETE CBC W/AUTO DIFF WBC: CPT | Mod: HCNC

## 2024-03-18 PROCEDURE — 36415 COLL VENOUS BLD VENIPUNCTURE: CPT | Mod: HCNC | Performed by: NURSE PRACTITIONER

## 2024-03-18 PROCEDURE — 80069 RENAL FUNCTION PANEL: CPT | Mod: HCNC | Performed by: NURSE PRACTITIONER

## 2024-03-18 PROCEDURE — 96372 THER/PROPH/DIAG INJ SC/IM: CPT | Mod: HCNC

## 2024-03-18 RX ADMIN — EPOETIN ALFA-EPBX 20000 UNITS: 10000 INJECTION, SOLUTION INTRAVENOUS; SUBCUTANEOUS at 02:03

## 2024-03-18 NOTE — NURSING
Injection given without difficulties to Right arm.Bandaid applied. Patient instructed to stay in the clinic for 15 minutes. Patient verbalized understanding and will notify nurse with any complaints.

## 2024-03-19 ENCOUNTER — TELEPHONE (OUTPATIENT)
Dept: PRIMARY CARE CLINIC | Facility: CLINIC | Age: 69
End: 2024-03-19
Payer: MEDICARE

## 2024-03-19 NOTE — TELEPHONE ENCOUNTER
----- Message from Luz Dickson NP sent at 3/18/2024  4:24 PM CDT -----  Potassium elevated and renal function is a little worse. Please ask Ms Damon to increase Veltassa to daily, Hold Lasix x 5 days, repeat RFP Thursday. TY

## 2024-03-23 DIAGNOSIS — J18.9 PNEUMONIA DUE TO INFECTIOUS ORGANISM, UNSPECIFIED LATERALITY, UNSPECIFIED PART OF LUNG: ICD-10-CM

## 2024-03-25 RX ORDER — DOXYCYCLINE 100 MG/1
CAPSULE ORAL
Qty: 14 CAPSULE | Refills: 0 | OUTPATIENT
Start: 2024-03-25

## 2024-03-26 DIAGNOSIS — J18.9 PNEUMONIA DUE TO INFECTIOUS ORGANISM, UNSPECIFIED LATERALITY, UNSPECIFIED PART OF LUNG: ICD-10-CM

## 2024-03-26 RX ORDER — CEFDINIR 300 MG/1
CAPSULE ORAL
Qty: 10 CAPSULE | Refills: 0 | OUTPATIENT
Start: 2024-03-26

## 2024-04-01 NOTE — PROGRESS NOTES
Patient ID: Nadia Damon is a 68 y.o. female.    Chief Complaint:     HPI: Patient presents for breast cancer survivorship and surveillance    DIAGNOSIS: L breast microinvasive carcinoma, pTmi(m) N1mi(sn) cM0, ER/CO negative, high grade     TREATMENT HISTORY:   1. L lumpectomy 9/10/21  2. L sentinel node biopsy 10/12/21  3. Attempted chemotherapy  4. 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 She was treated with 1 cycle of systemic chemotherapy cytoxan and taxotere and udenyca that was discontinued due to toxicity. She had radiation, completed 4/14/22.   Pt heart transplant patient 31 years.     Pt has CKD and is on epo injections and hematology monitors labs    Pt had mckenzie diagnostic mammo and bilateral ultrasounds of breasts that revealed a complex cyst at 9 oclock- 6 mon f/u was recommended- pt was anxious - ultrasound core biopsy performed 10/31/2022- benign results    Risk factors identified:     Menarche at 14 y/o  G 2 P 2  First pregnancy at 16  LMP: partial hyst - 1984  Estrogen:none  Radiation to the neck or chest wall- none  Prior breast biopsies or atypical hyperplasia- right breast excisional biopsy- benign- left core biopsy benign in past       FH: mother breast cancer at 40's.     Body mass index is 29.31 kg/m².    Interval History:     Persistent symptoms/side effects of treatment:    Functional changes: ROM, neuropathy, weakness or fatigue- fatigue due to anemia- ROM left arm has improved with PT    Psychosocial challenges- no depression     Lymphedema- completed PT in April 2023- going back due to cording- ROM has improved significantly    Diet/Nutrition- stable    Sexual Dysfunction or challenges- denies    Review of Systems   Constitutional: Negative.    HENT: Negative.     Eyes: Negative.    Respiratory: Negative.     Cardiovascular: Negative.    Gastrointestinal:  Negative.    Endocrine: Negative.    Genitourinary: Negative.    Musculoskeletal:  Positive for back pain (chronic=improving).        Pt relates left shoulder pain and stiffness since completed radiation- completed PT and improved-    Skin: Negative.    Allergic/Immunologic: Negative.    Neurological: Negative.    Hematological: Negative.  Negative for adenopathy.   Psychiatric/Behavioral: Negative.       Breast: Pt denies any breast pain, has left lateral chest wall tenderness after last biopsy, nipple discharge, or palpable mass. No prior trauma or bruising.     Current Outpatient Medications   Medication Sig Dispense Refill    benzonatate (TESSALON) 100 MG capsule Take 1 capsule by mouth 3 times daily 30 capsule 0    biotin 10,000 mcg Cap Take 1 tablet by mouth once daily.      cycloSPORINE modified, NEORAL, (NEORAL) 25 MG capsule Take 3 capsules (75 mg total) by mouth 2 (two) times daily. 540 capsule 1    ergocalciferol (VITAMIN D2) 50,000 unit Cap Take 50,000 Units by mouth every 7 days.      EVENING PRIMROSE OIL ORAL Take 1,000 mg by mouth once daily.      fluticasone propionate (FLONASE) 50 mcg/actuation nasal spray 2 sprays (100 mcg total) by Each Nostril route once daily. 16 g 1    furosemide (LASIX) 20 MG tablet TAKE 1 TABLET EVERY DAY 90 tablet 3    guaiFENesin (MUCINEX) 600 mg 12 hr tablet Take 1,200 mg by mouth as needed.      guaiFENesin-codeine 100-10 mg/5 ml (TUSSI-ORGANIDIN NR)  mg/5 mL syrup Take 5ml by mouth nightly as needed for cough 120 mL 0    hydrALAZINE (APRESOLINE) 50 MG tablet Take 1 tablet (50 mg total) by mouth every 8 (eight) hours. 270 tablet 3    hydrocortisone 1 % cream APPLY TOPICALLY 2 (TWO) TIMES DAILY. 35 g 3    LIDOcaine (LIDODERM) 5 % Place 1 patch onto the skin daily as needed (back pain). Remove & Discard patch within 12 hours or as directed by MD 30 patch 1    loratadine (CLARITIN) 10 mg tablet Take 1 tablet (10 mg total) by mouth once daily. 30 tablet 1     multivitamin capsule Take 1 capsule by mouth once daily.      NIFEdipine (PROCARDIA-XL) 30 MG (OSM) 24 hr tablet Take 1 tablet (30 mg total) by mouth once daily. 30 tablet 11    pantoprazole (PROTONIX) 20 MG tablet Take 1 tablet (20 mg total) by mouth once daily. 30 tablet 11    patiromer calcium sorbitex (VELTASSA) 8.4 gram PwPk Take 1 packet (8.4 g total) by mouth once daily. 30 packet 2    polyethylene glycol (GLYCOLAX) 17 gram/dose powder Take 17 g by mouth once daily.      psyllium husk (METAMUCIL ORAL) Take 17 g by mouth once daily.      RETACRIT 20,000 unit/mL injection       sulfamethoxazole-trimethoprim 800-160mg (BACTRIM DS) 800-160 mg Tab Take 1 tablet by mouth 2 (two) times daily. for 10 days 20 tablet 0    temazepam (RESTORIL) 30 mg capsule Take 1 capsule (30 mg total) by mouth nightly as needed for Insomnia. 90 capsule 0    UNABLE TO FIND medication name: Stool softener (Ducolax) Laxative      vitamin E 400 UNIT capsule Take 400 Units by mouth once daily.      zolpidem (AMBIEN) 5 MG Tab Take 1 tablet (5 mg total) by mouth nightly as needed (insomnia). 90 tablet 0    albuterol (VENTOLIN HFA) 90 mcg/actuation inhaler Inhale 2 puffs into the lungs every 6 (six) hours as needed for Wheezing or Shortness of Breath. Rescue 18 g 1    calcitonin, salmon, (FORTICAL) 200 unit/actuation nasal spray 1 spray by Nasal route once daily. 3 each 3    carvediloL (COREG) 6.25 MG tablet Take 1 tablet (6.25 mg total) by mouth 2 (two) times daily with meals. 180 tablet 3    pregabalin (LYRICA) 50 MG capsule Take 1 capsule (50 mg total) by mouth 2 (two) times daily. 180 capsule 1     Current Facility-Administered Medications   Medication Dose Route Frequency Provider Last Rate Last Admin    acetaminophen tablet 1,000 mg  1,000 mg Oral On Call Procedure PRANAY Villalobos MD        famotidine tablet 20 mg  20 mg Oral On Call Procedure PRANAY Villalobos MD           Review of patient's allergies indicates:   Allergen  Reactions    Lisinopril Swelling and Rash    Augmentin [amoxicillin-pot clavulanate] Diarrhea    Zyvox [linezolid] Nausea And Vomiting       Past Medical History:   Diagnosis Date    Abdominal wall hernia     CT Renal 6/11/2018---Small fat containing superior ventral abdominal wall hernia at the epicardial pacing lead site.    Abnormal mammogram 10/12/2021    GRACE (acute kidney injury) 11/22/2021    Anxiety     Arthritis     ZEN HIPS    Breast cancer in female 08/2021    LEFT BREAST    C. difficile colitis 11/29/2021    Cellulitis of axilla, left 12/23/2021    Chronic diastolic heart failure 12/16/2021    Chronic kidney disease     stage 4, GFR 15-29 ml/min    Chronic midline low back pain without sciatica 06/18/2018    Closed nondisplaced fracture of distal phalanx of left great toe with routine healing 10/22/2018    Coronary artery disease 1993    heart transplant    Cystitis 05/10/2022    Depression     Encounter for blood transfusion     Fibromyalgia     on Lyrica    Heart failure     native heart cardiomyopathy    Heart transplanted 1993    due to cardiomyopathy    History of hyperparathyroidism; Hyperparathyroidism, secondary renal     PT DENIES    Hypertension     Immune disorder     anti rejection meds    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    Impaired mobility 07/28/2022    Iron deficiency anemia 08/15/2017    Kidney stones     passed per pt    Obesity     Other osteoporosis without current pathological fracture 08/30/2019    Parotitis, acute 9/19/2023    Pharyngitis 1/9/2019    Severe sepsis 11/22/2021    Shingles 2003 approx    left leg    Subclinical hypothyroidism 06/16/2023    Thrombocytopenia, unspecified 11/29/2021    Trouble in sleeping     Urinary incontinence        Past Surgical History:   Procedure Laterality Date    BLADDER SURGERY  2015 approx    mesh - Dr Everett then 2nd reconstructive sx Dr Onofre    BREAST BIOPSY Bilateral     NEGATIVE    BREAST BIOPSY Right  10/31/2022    benign    BREAST LUMPECTOMY Left 2021    BREAST SURGERY Left 09/28/2015    Bx - benign    BREAST SURGERY Right 12/2015    Bx benign    CARDIAC PACEMAKER REMOVAL Left 06/26/2014    Pacer defirillator removed. Put in 1993 aat time of heart transplant    CARPAL TUNNEL RELEASE Left 03/03/2015    Dr. Hall    COLONOSCOPY N/A 02/25/2021    Procedure: COLONOSCOPY;  Surgeon: Freida Ramirez MD;  Location: [facility] ENDO;  Service: Endoscopy;  Laterality: N/A;    CYSTOCELE REPAIR      Twice with mesh removal    EPIDURAL STEROID INJECTION INTO CERVICAL SPINE N/A 02/02/2023    Procedure: T11/T12 IL HELLEN;  Surgeon: Jassi Pierre MD;  Location: [facility] PAIN MGT;  Service: Pain Management;  Laterality: N/A;    HEART TRANSPLANT  1993    HERNIA REPAIR Right 1971 approx    Inguinal    HYSTERECTOMY  1983    vag hyst /LSO     INCISION AND DRAINAGE OF ABSCESS Left 12/24/2021    Procedure: INCISION AND DRAINAGE, ABSCESS;  Surgeon: Joseph Longo MD;  Location: [facility] OR;  Service: General;  Laterality: Left;    INJECTION OF ANESTHETIC AGENT AROUND MEDIAL BRANCH NERVES INNERVATING LUMBAR FACET JOINT Right 10/19/2022    Procedure: Right L4/L5 and L5/S1 MBB;  Surgeon: Jassi Pierre MD;  Location: [facility] PAIN MGT;  Service: Pain Management;  Laterality: Right;    INJECTION OF ANESTHETIC AGENT AROUND MEDIAL BRANCH NERVES INNERVATING LUMBAR FACET JOINT Right 11/09/2022    Procedure: Right L4/L5 and L5/S1 MBB;  Surgeon: Jassi Pierre MD;  Location: [facility] PAIN MGT;  Service: Pain Management;  Laterality: Right;    INJECTION OF ANESTHETIC AGENT INTO SACROILIAC JOINT Right 08/22/2022    Procedure: Right SIJ Injection Right L5/S1 Facte Injection;  Surgeon: Jassi Pierre MD;  Location: [facility] PAIN MGT;  Service: Pain Management;  Laterality: Right;    INSERTION OF TUNNELED CENTRAL VENOUS CATHETER (CVC) WITH SUBCUTANEOUS PORT N/A 11/09/2021    Procedure: [id number];  Surgeon: Christoph Douglas MD;  Location: [facility] OR;   Service: General;  Laterality: N/A;    RADIOFREQUENCY THERMOCOAGULATION Right 12/07/2022    Procedure: Right L4/L5 and L5/S1 Lumbar RFA;  Surgeon: Jassi Pierre MD;  [facility]: [facility];  Service: Pain Management;  Laterality: Right;    REMOVAL OF VASCULAR ACCESS PORT      ROBOT-ASSISTED LAPAROSCOPIC ABDOMINAL SACROCOLPOPEXY N/A 8/10/2023    Procedure: ROBOTIC SACROCOLPOPEXY, ABDOMEN;  Surgeon: PRANAY Villalobos MD;  Location: [facility];  Service: OB/GYN;  Laterality: N/A;    ROBOT-ASSISTED LAPAROSCOPIC OOPHORECTOMY Right 8/10/2023    Procedure: ROBOTIC OOPHORECTOMY;  Surgeon: PRANAY Villalobos MD;  Location: [facility] OR;  Service: OB/GYN;  Laterality: Right;    SENTINEL LYMPH NODE BIOPSY Left 10/12/2021    Procedure: BIOPSY, LYMPH NODE, SENTINEL;  Surgeon: Christoph Douglas MD;  Location: [facility];  Service: General;  Laterality: Left;    TOE SURGERY      XI ROBOTIC URETHROPEXY N/A 8/10/2023    Procedure: XI ROBOTIC URETHROPEXY;  Surgeon: PRANAY Villalobos MD;  Location: [facility];  Service: OB/GYN;  Laterality: N/A;       Family History   Problem Relation Name Age of Onset    Cancer Mother  38        breast    Breast cancer Mother      Breast cancer Maternal Grandmother      Heart disease Maternal Grandmother      Hypertension Son      Cataracts Cousin      Diabetes Neg Hx      Stroke Neg Hx      Kidney disease Neg Hx      Asthma Neg Hx      COPD Neg Hx      Melanoma Neg Hx      Hyperlipidemia Neg Hx         Social History     Socioeconomic History    Marital status: Single    Number of children: 2    Highest education level: 11th grade   Occupational History    Occupation: Retired   Tobacco Use    Smoking status: Never     Passive exposure: Never    Smokeless tobacco: Never   Substance and Sexual Activity    Alcohol use: Never     Alcohol/week: 0.0 standard drinks of alcohol    Drug use: No    Sexual activity: Not Currently     Partners: Male     Birth control/protection: See Surgical Hx   Other Topics  Concern    Are you pregnant or think you may be? No    Breast-feeding No   Social History Narrative    Single. 2 children , 1  at 30 yoa   strep throat -  pneumonia and renal complications after not completing course of AB. Other child lives in [place], [place]. Has a cousin locally that could help in an emergency. 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 Caffeine intake =- 1 cola a day. No coffee, + occasional tea, avoids caffeine especially at night. Still drives. She does not have a Living Will or Advanced directive.      Social Determinants of Health     Financial Resource Strain: Low Risk  (2023)    Overall Financial Resource Strain (CARDIA)     Difficulty of Paying Living Expenses: Not hard at all   Food Insecurity: No Food Insecurity (2023)    Hunger Vital Sign     Worried About Running Out of Food in the Last Year: Never true     Ran Out of Food in the Last Year: Never true   Transportation Needs: No Transportation Needs (2023)    PRAPARE - Transportation     Lack of Transportation (Medical): No     Lack of Transportation (Non-Medical): No   Physical Activity: Sufficiently Active (2023)    Exercise Vital Sign     Days of Exercise per Week: 3 days     Minutes of Exercise per Session: 60 min   Recent Concern: Physical Activity - Insufficiently Active (2023)    Exercise Vital Sign     Days of Exercise per Week: 2 days     Minutes of Exercise per Session: 60 min   Stress: No Stress Concern Present (2023)    [employer] - Occupational Stress Questionnaire     Feeling of Stress : Not at all   Social Connections: Moderately Isolated (2023)    Social Connection and Isolation Panel [NHANES]     Frequency of Communication with Friends and Family: More than three times a week     Frequency of Social Gatherings with Friends and Family: More than three times a week     Attends Baptism Services: More than 4 times  per year     Active Member of Clubs or Organizations: No     Attends Club or Organization Meetings: Never     Marital Status: Never    Housing Stability: Low Risk  (11/16/2023)    Housing Stability Vital Sign     Unable to Pay for Housing in the Last Year: No     Number of Places Lived in the Last Year: 1     Unstable Housing in the Last Year: No       Vitals:    04/18/24 1358   Resp: 16       Physical Exam  Constitutional:       Appearance: She is well-developed.   HENT:      Head: Normocephalic and atraumatic.      Right Ear: External ear normal.      Left Ear: External ear normal.      Mouth/Throat:      Pharynx: No oropharyngeal exudate.   Eyes:      General: No scleral icterus.        Right eye: No discharge.         Left eye: No discharge.      Conjunctiva/sclera: Conjunctivae normal.      Pupils: Pupils are equal, round, and reactive to light.   Neck:      Thyroid: No thyromegaly.   Chest:   Breasts:     Right: No inverted nipple, mass, nipple discharge, skin change or tenderness.      Left: No inverted nipple, mass, nipple discharge, skin change or tenderness.   Musculoskeletal:         General: Tenderness (left axilla and anterior shoulder tightness- no lymphedema) present.      Cervical back: Normal range of motion and neck supple.   Lymphadenopathy:      Head:      Right side of head: No submental, submandibular, tonsillar, preauricular, posterior auricular or occipital adenopathy.      Left side of head: No submental, submandibular, tonsillar, preauricular, posterior auricular or occipital adenopathy.      Cervical: No cervical adenopathy.      Right cervical: No superficial or posterior cervical adenopathy.     Left cervical: No superficial or posterior cervical adenopathy.      Upper Body:      Right upper body: No supraclavicular adenopathy.      Left upper body: No supraclavicular adenopathy.   Skin:     General: Skin is warm and dry.      Coloration: Skin is not pale.      Findings: No  erythema or rash.   Neurological:      Mental Status: She is alert and oriented to person, place, and time.   Psychiatric:         Behavior: Behavior normal.         Thought Content: Thought content normal.         Judgment: Judgment normal.       9/19/2022  Result:   Mammo Digital Diagnostic Bilat with Iván  US Breast Bilateral Limited     History:  Patient is 67 y.o. and is seen for diagnostic imaging.     Films Compared:  Prior images (if available) were compared.     Findings:  This procedure was performed using tomosynthesis. Computer-aided detection was utilized in the interpretation of this examination.  The breasts have scattered areas of fibroglandular density.         Left breast demonstrates postoperative lumpectomy findings of the upper outer breast with post radiation skin thickening.  No detrimental mammographic change appreciated.  Left axillary region postsurgical findings present without concerning mammographic mass.            Ultrasound was performed at the area of pain within the left axilla without corresponding sonographic abnormality.  Follow-up imaging at the 3 o'clock position demonstrates resolution of previously noted fluid with ill-defined hypoechoic area felt to represent postsurgical scarring.           Right breast demonstrates multiple lateral oval masses with the more anterior mass larger when compared to 07/13/2021.  Ultrasound demonstrates multiple complex cysts at 09:00 o'clock corresponding to mammographic lesions.  Largest cyst measures 9 mm and corresponds to the more anterior mammographic lesion.  Six-month follow-up can be obtained.            Impression:  Right breast 09:00 o'clock complex cyst. Six-month follow-up recommended.   No axillary abnormality at the area pain with postsurgical and radiation changes of the left breast. Continue follow-up recommended.         BI-RADS Category:   Overall: 3 - Probably Benign     Recommendation:  Short interval follow-up is  recommended in 6 Months.      10/31/2022  Final Pathologic Diagnosis 1. Right breast (3 o'clock position), biopsy:       -  Benign breast tissue with fibrosis and marked duct ectasia with   microcyst formation and papillary         apocrine metaplasia       -  Negative for atypia or malignancy        Bilateral diagnostic mammo 4/10/2023- wnl    Mammogram today  Result:   Mammo Digital Diagnostic Bilat with Iván     History:  Patient is 68 y.o. and is seen for diagnostic imaging.     Films Compared:  Compared to: 04/10/2023 Mammo Digital Diagnostic Bilat with Iván, 11/28/2022 Mammo Digital Diagnostic Left with Iván, 11/28/2022 US Breast Left Limited, 05/16/2022 Mammo Digital Diagnostic Left with Iván, and 05/16/2022 US Breast Left Limited     Findings:  This procedure was performed using tomosynthesis. Computer-aided detection was utilized in the interpretation of this examination.  The breasts have scattered areas of fibroglandular density.      Left  There is a focal asymmetry seen in the upper outer quadrant of the left breast in the middle depth. Compared to the previous study, there are no significant changes. There are also associated punctate calcifications.   There are post-surgical findings from a previous lumpectomy with radiation seen in the upper outer quadrant of the left breast. There has been no interval development of a suspicious mass, microcalcification, or architectural distortion.      Right  There are post-surgical findings from a previous percutaneous biopsy seen in the retroareolar region of the right breast at 10 o'clock in the anterior depth. Compared to the previous study, there are no significant changes. There has been no interval development of a suspicious mass, microcalcification, or architectural distortion.      Impression:  Bilateral  There is no mammographic evidence of malignancy.     BI-RADS Category:   Overall: 2 - Benign        Recommendation:  Routine screening mammogram in 1  year is recommended.          Assessment & Plan:  Secondary and unspecified malignant neoplasm of axilla and upper limb lymph nodes    Immunocompromised patient    Ductal carcinoma in situ (DCIS) of left breast    Negative clinical findings on exam.  bruce diagnostic mammo 4/10/2023- wnl  S/p benign core biopsy right breast 10/2022   Bruce mammo today- wnl  Rtc Oct for exam only  BSE encouraged- call for any changes  Eating healthy diet and exercising encouraged  Left axilla tightness with ROM- pt had therapy with PT  4/2023- improved significantly and in PT as needed now

## 2024-04-03 NOTE — PROGRESS NOTES
Subjective:       Patient ID: Nadia Damon is a 68 y.o. female.    Chief Complaint: Anemia    Primary Oncologist/Hematologist: Dr. Collins     HPI: Ms. Damon is a 68 year old female who is following up for her anemia due to CKD. Epo has been initiated, last dose 3/18/24-20kU.  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 She was treated with 1 cycle of systemic chemotherapy cytoxan and taxotere and udenyca that was discontinued due to toxicity. She had radiation, completed 22.   She then has abnormal mammogram of R breast. S/p bx, which is benign.   Pmhx: heart transplant 26 yrs ago, on anti rejection medication. chronic back pain and disc degeneration, s/p ROBOTIC SACROCOLPOPEXY, ABDOMEN, ROBOTIC URETHROPEXY, ROBOTIC OOPHORECTOMY (Right) on 8/10/23 due to  stress urinary incontinence and vaginal prolapse. She had hospital visit for hyperkalemia, following with nephrology for this    Today: She states she is much better. She did have covid, but has since recovered, just feeling some residual fatigue. She denies any bleeding, n/v/d/c, other illnesses, fevers, weight loss. She continues with lasix.     Social History     Socioeconomic History    Marital status: Single    Number of children: 2    Highest education level: 11th grade   Occupational History    Occupation: Retired   Tobacco Use    Smoking status: Never     Passive exposure: Never    Smokeless tobacco: Never   Substance and Sexual Activity    Alcohol use: Never     Alcohol/week: 0.0 standard drinks of alcohol    Drug use: No    Sexual activity: Not Currently     Partners: Male     Birth control/protection: See Surgical Hx   Other Topics Concern    Are you pregnant or think you may be? No    Breast-feeding No   Social History Narrative    Single. 2 children , 1  at 30 yoa  2014 strep throat -  pneumonia and renal complications after not completing course of AB. Other child  lives in [place], [place]. Has a cousin locally that could help in an emergency. 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 Caffeine intake =- 1 cola a day. No coffee, + occasional tea, avoids caffeine especially at night. Still drives. She does not have a Living Will or Advanced directive.      Social Determinants of Health     Financial Resource Strain: Low Risk  (11/16/2023)    Overall Financial Resource Strain (CARDIA)     Difficulty of Paying Living Expenses: Not hard at all   Food Insecurity: No Food Insecurity (11/16/2023)    Hunger Vital Sign     Worried About Running Out of Food in the Last Year: Never true     Ran Out of Food in the Last Year: Never true   Transportation Needs: No Transportation Needs (11/16/2023)    PRAPARE - Transportation     Lack of Transportation (Medical): No     Lack of Transportation (Non-Medical): No   Physical Activity: Sufficiently Active (11/16/2023)    Exercise Vital Sign     Days of Exercise per Week: 3 days     Minutes of Exercise per Session: 60 min   Recent Concern: Physical Activity - Insufficiently Active (9/22/2023)    Exercise Vital Sign     Days of Exercise per Week: 2 days     Minutes of Exercise per Session: 60 min   Stress: No Stress Concern Present (11/16/2023)    [employer] - Occupational Stress Questionnaire     Feeling of Stress : Not at all   Social Connections: Moderately Isolated (11/16/2023)    Social Connection and Isolation Panel [NHANES]     Frequency of Communication with Friends and Family: More than three times a week     Frequency of Social Gatherings with Friends and Family: More than three times a week     Attends [employer] Services: More than 4 times per year     Active Member of Clubs or Organizations: No     Attends Club or Organization Meetings: Never     Marital Status: Never    Housing Stability: Low Risk  (11/16/2023)    Housing Stability Vital Sign     Unable to Pay for Housing  in the Last Year: No     Number of Places Lived in the Last Year: 1     Unstable Housing in the Last Year: No       Past Medical History:   Diagnosis Date    Abdominal wall hernia     CT Renal 6/11/2018---Small fat containing superior ventral abdominal wall hernia at the epicardial pacing lead site.    Abnormal mammogram 10/12/2021    GRACE (acute kidney injury) 11/22/2021    Anxiety     Arthritis     ZEN HIPS    Breast cancer in female 08/2021    LEFT BREAST    C. difficile colitis 11/29/2021    Cellulitis of axilla, left 12/23/2021    Chronic diastolic heart failure 12/16/2021    Chronic kidney disease     stage 4, GFR 15-29 ml/min    Chronic midline low back pain without sciatica 06/18/2018    Closed nondisplaced fracture of distal phalanx of left great toe with routine healing 10/22/2018    Coronary artery disease 1993    heart transplant    Cystitis 05/10/2022    Depression     Encounter for blood transfusion     Fibromyalgia     on Lyrica    Heart failure     native heart cardiomyopathy    Heart transplanted 1993    due to cardiomyopathy    History of hyperparathyroidism; Hyperparathyroidism, secondary renal     PT DENIES    Hypertension     Immune disorder     anti rejection meds    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    Impaired mobility 07/28/2022    Iron deficiency anemia 08/15/2017    Kidney stones     passed per pt    Obesity     Other osteoporosis without current pathological fracture 08/30/2019    Parotitis, acute 9/19/2023    Pharyngitis 1/9/2019    Severe sepsis 11/22/2021    Shingles 2003 approx    left leg    Subclinical hypothyroidism 06/16/2023    Thrombocytopenia, unspecified 11/29/2021    Trouble in sleeping     Urinary incontinence        Family History   Problem Relation Age of Onset    Cancer Mother 38        breast    Breast cancer Mother     Breast cancer Maternal Grandmother     Heart disease Maternal Grandmother     Hypertension Son     Cataracts Cousin     Diabetes  Neg Hx     Stroke Neg Hx     Kidney disease Neg Hx     Asthma Neg Hx     COPD Neg Hx     Melanoma Neg Hx     Hyperlipidemia Neg Hx        Past Surgical History:   Procedure Laterality Date    BLADDER SURGERY  2015 approx    mesh - Dr Everett then 2nd reconstructive sx Dr Onofre    BREAST BIOPSY Bilateral     NEGATIVE    BREAST BIOPSY Right 10/31/2022    benign    BREAST LUMPECTOMY Left 2021    BREAST SURGERY Left 09/28/2015    Bx - benign    BREAST SURGERY Right 12/2015    Bx benign    CARDIAC PACEMAKER REMOVAL Left 06/26/2014    Pacer defirillator removed. Put in 1993 aat time of heart transplant    CARPAL TUNNEL RELEASE Left 03/03/2015    Dr. Hall    COLONOSCOPY N/A 02/25/2021    Procedure: COLONOSCOPY;  Surgeon: Freida Ramirez MD;  Location: [facility] ENDO;  Service: Endoscopy;  Laterality: N/A;    CYSTOCELE REPAIR      Twice with mesh removal    EPIDURAL STEROID INJECTION INTO CERVICAL SPINE N/A 02/02/2023    Procedure: T11/T12 IL HELLEN;  Surgeon: Jassi Pierre MD;  Location: [facility] PAIN MGT;  Service: Pain Management;  Laterality: N/A;    HEART TRANSPLANT  1993    HERNIA REPAIR Right 1971 approx    Inguinal    HYSTERECTOMY  1983    vag hyst /LSO     INCISION AND DRAINAGE OF ABSCESS Left 12/24/2021    Procedure: INCISION AND DRAINAGE, ABSCESS;  Surgeon: Joseph Longo MD;  Location: [facility] OR;  Service: General;  Laterality: Left;    INJECTION OF ANESTHETIC AGENT AROUND MEDIAL BRANCH NERVES INNERVATING LUMBAR FACET JOINT Right 10/19/2022    Procedure: Right L4/L5 and L5/S1 MBB;  Surgeon: Jassi Pierre MD;  Location: [facility] PAIN MGT;  Service: Pain Management;  Laterality: Right;    INJECTION OF ANESTHETIC AGENT AROUND MEDIAL BRANCH NERVES INNERVATING LUMBAR FACET JOINT Right 11/09/2022    Procedure: Right L4/L5 and L5/S1 MBB;  Surgeon: Jassi Pierre MD;  Location: [facility] PAIN MGT;  Service: Pain Management;  Laterality: Right;    INJECTION OF ANESTHETIC AGENT INTO SACROILIAC JOINT Right 08/22/2022     Procedure: Right SIJ Injection Right L5/S1 Facte Injection;  Surgeon: Jassi Pierre MD;  Location: [facility] PAIN MGT;  Service: Pain Management;  Laterality: Right;    INSERTION OF TUNNELED CENTRAL VENOUS CATHETER (CVC) WITH SUBCUTANEOUS PORT N/A 11/09/2021    Procedure: [id number];  Surgeon: Christoph Douglas MD;  Location: [facility] OR;  Service: General;  Laterality: N/A;    RADIOFREQUENCY THERMOCOAGULATION Right 12/07/2022    Procedure: Right L4/L5 and L5/S1 Lumbar RFA;  Surgeon: Jassi Pierre MD;  Location: [facility] PAIN MGT;  Service: Pain Management;  Laterality: Right;    REMOVAL OF VASCULAR ACCESS PORT      ROBOT-ASSISTED LAPAROSCOPIC ABDOMINAL SACROCOLPOPEXY N/A 8/10/2023    Procedure: ROBOTIC SACROCOLPOPEXY, ABDOMEN;  Surgeon: PRANAY Villalobos MD;  Location: [facility];  Service: OB/GYN;  Laterality: N/A;    ROBOT-ASSISTED LAPAROSCOPIC OOPHORECTOMY Right 8/10/2023    Procedure: ROBOTIC OOPHORECTOMY;  Surgeon: PRANAY Villalobos MD;  Location: [facility] OR;  Service: OB/GYN;  Laterality: Right;    SENTINEL LYMPH NODE BIOPSY Left 10/12/2021    Procedure: BIOPSY, LYMPH NODE, SENTINEL;  Surgeon: Christoph Douglas MD;  Location: [facility];  Service: General;  Laterality: Left;    TOE SURGERY      XI ROBOTIC URETHROPEXY N/A 8/10/2023    Procedure: XI ROBOTIC URETHROPEXY;  Surgeon: PRANAY Villalobos MD;  Location: [facility];  Service: OB/GYN;  Laterality: N/A;       Review of Systems   Constitutional:  Positive for fatigue. Negative for activity change, appetite change, chills, diaphoresis, fever and unexpected weight change.   HENT:  Negative for congestion and nosebleeds.    Respiratory:  Negative for cough and shortness of breath.    Cardiovascular:  Negative for chest pain and leg swelling.   Gastrointestinal:  Negative for abdominal pain, blood in stool, constipation, diarrhea, nausea and vomiting.   Genitourinary:  Negative for hematuria.   Musculoskeletal:  Positive for arthralgias and back pain.   Skin:   Negative for color change and pallor.   Allergic/Immunologic: Positive for immunocompromised state.   Neurological:  Negative for dizziness, weakness, light-headedness and headaches.   Hematological:  Does not bruise/bleed easily.         Medication List with Changes/Refills   Current Medications    ALBUTEROL (VENTOLIN HFA) 90 MCG/ACTUATION INHALER    Inhale 2 puffs into the lungs every 6 (six) hours as needed for Wheezing or Shortness of Breath. Rescue    BENZONATATE (TESSALON) 100 MG CAPSULE    Take 1 capsule by mouth 3 times daily    BIOTIN 10,000 MCG CAP    Take 1 tablet by mouth once daily.    CALCITONIN, SALMON, (FORTICAL) 200 UNIT/ACTUATION NASAL SPRAY    1 spray by Nasal route once daily.    CARVEDILOL (COREG) 6.25 MG TABLET    Take 1 tablet (6.25 mg total) by mouth 2 (two) times daily with meals.    CYCLOSPORINE MODIFIED, NEORAL, (NEORAL) 25 MG CAPSULE    Take 3 capsules (75 mg total) by mouth 2 (two) times daily.    ERGOCALCIFEROL (VITAMIN D2) 50,000 UNIT CAP    Take 50,000 Units by mouth every 7 days.    EVENING PRIMROSE OIL ORAL    Take 1,000 mg by mouth once daily.    FLUTICASONE PROPIONATE (FLONASE) 50 MCG/ACTUATION NASAL SPRAY    2 sprays (100 mcg total) by Each Nostril route once daily.    FUROSEMIDE (LASIX) 20 MG TABLET    TAKE 1 TABLET EVERY DAY    GUAIFENESIN (MUCINEX) 600 MG 12 HR TABLET    Take 1,200 mg by mouth as needed.    GUAIFENESIN-CODEINE 100-10 MG/5 ML (TUSSI-ORGANIDIN NR)  MG/5 ML SYRUP    Take 5ml by mouth nightly as needed for cough    HYDRALAZINE (APRESOLINE) 50 MG TABLET    Take 1 tablet (50 mg total) by mouth every 8 (eight) hours.    HYDROCORTISONE 1 % CREAM    APPLY TOPICALLY 2 (TWO) TIMES DAILY.    LIDOCAINE (LIDODERM) 5 %    Place 1 patch onto the skin daily as needed (back pain). Remove & Discard patch within 12 hours or as directed by MD    LORATADINE (CLARITIN) 10 MG TABLET    Take 1 tablet (10 mg total) by mouth once daily.    MULTIVITAMIN CAPSULE    Take 1 capsule by  mouth once daily.    NIFEDIPINE (PROCARDIA-XL) 30 MG (OSM) 24 HR TABLET    Take 1 tablet (30 mg total) by mouth once daily.    PANTOPRAZOLE (PROTONIX) 20 MG TABLET    Take 1 tablet (20 mg total) by mouth once daily.    PATIROMER CALCIUM SORBITEX (VELTASSA) 8.4 GRAM PWPK    Take 1 packet (8.4 g total) by mouth once daily.    POLYETHYLENE GLYCOL (GLYCOLAX) 17 GRAM/DOSE POWDER    Take 17 g by mouth once daily.    PREGABALIN (LYRICA) 50 MG CAPSULE    Take 1 capsule (50 mg total) by mouth 2 (two) times daily.    PSYLLIUM HUSK (METAMUCIL ORAL)    Take 17 g by mouth once daily.    RETACRIT 20,000 UNIT/ML INJECTION        SOLIFENACIN (VESICARE) 10 MG TABLET    Take 1 tablet (10 mg total) by mouth once daily.    UNABLE TO FIND    medication name: Stool softener (Ducolax) Laxative    VITAMIN E 400 UNIT CAPSULE    Take 400 Units by mouth once daily.    ZOLPIDEM (AMBIEN) 5 MG TAB    Take 1 tablet (5 mg total) by mouth nightly as needed (insomnia).     Objective:     Vitals:    04/08/24 1302   BP: 123/77   Pulse: 97   Resp: 20   Temp: 98 °F (36.7 °C)     Physical Exam  Constitutional:       General: She is not in acute distress.     Appearance: She is not ill-appearing, toxic-appearing or diaphoretic.   HENT:      Head: Normocephalic and atraumatic.   Eyes:      Conjunctiva/sclera: Conjunctivae normal.   Cardiovascular:      Rate and Rhythm: Normal rate.   Pulmonary:      Effort: Pulmonary effort is normal.   Musculoskeletal:      Right lower leg: Edema present.      Left lower leg: Edema present.   Skin:     General: Skin is warm and dry.      Coloration: Skin is not jaundiced or pale.      Findings: No bruising, erythema, lesion or rash.   Neurological:      Mental Status: She is alert.      Gait: Gait normal.          Labs/Results:  Lab Results   Component Value Date    WBC 4.47 04/05/2024    RBC 3.17 (L) 04/05/2024    HGB 9.0 (L) 04/05/2024    HCT 27.8 (L) 04/05/2024    MCV 88 04/05/2024    MCH 28.4 04/05/2024    MCHC 32.4  04/05/2024    RDW 15.5 (H) 04/05/2024     04/05/2024    MPV 9.2 04/05/2024    GRAN 2.8 04/05/2024    GRAN 62.3 04/05/2024    LYMPH 1.0 04/05/2024    LYMPH 21.3 04/05/2024    MONO 0.5 04/05/2024    MONO 11.9 04/05/2024    EOS 0.2 04/05/2024    BASO 0.03 04/05/2024    EOSINOPHIL 3.4 04/05/2024    BASOPHIL 0.7 04/05/2024       CMP  Sodium   Date Value Ref Range Status   04/05/2024 141 136 - 145 mmol/L Final     Potassium   Date Value Ref Range Status   04/05/2024 4.6 3.5 - 5.1 mmol/L Final     Chloride   Date Value Ref Range Status   04/05/2024 111 (H) 95 - 110 mmol/L Final     CO2   Date Value Ref Range Status   04/05/2024 19 (L) 23 - 29 mmol/L Final     Glucose   Date Value Ref Range Status   04/05/2024 107 70 - 110 mg/dL Final     BUN   Date Value Ref Range Status   04/05/2024 45 (H) 8 - 23 mg/dL Final     Creatinine   Date Value Ref Range Status   04/05/2024 3.0 (H) 0.5 - 1.4 mg/dL Final     Calcium   Date Value Ref Range Status   04/05/2024 9.1 8.7 - 10.5 mg/dL Final     Total Protein   Date Value Ref Range Status   04/05/2024 7.6 6.0 - 8.4 g/dL Final     Albumin   Date Value Ref Range Status   04/05/2024 3.3 (L) 3.5 - 5.2 g/dL Final     Total Bilirubin   Date Value Ref Range Status   04/05/2024 0.5 0.1 - 1.0 mg/dL Final     Comment:     For infants and newborns, interpretation of results should be based  on gestational age, weight and in agreement with clinical  observations.    Premature Infant recommended reference ranges:  Up to 24 hours.............<8.0 mg/dL  Up to 48 hours............<12.0 mg/dL  3-5 days..................<15.0 mg/dL  6-29 days.................<15.0 mg/dL       Alkaline Phosphatase   Date Value Ref Range Status   04/05/2024 97 55 - 135 U/L Final     AST   Date Value Ref Range Status   04/05/2024 31 10 - 40 U/L Final     ALT   Date Value Ref Range Status   04/05/2024 21 10 - 44 U/L Final     Anion Gap   Date Value Ref Range Status   04/05/2024 11 8 - 16 mmol/L Final     eGFR   Date  Value Ref Range Status   04/05/2024 16 (A) >60 mL/min/1.73 m^2 Final      Latest Reference Range & Units 04/05/24 12:43   Iron 30 - 160 ug/dL 67   TIBC 250 - 450 ug/dL 277   Saturated Iron 20 - 50 % 24   Transferrin 200 - 375 mg/dL 187 (L)   Ferritin 20.0 - 300.0 ng/mL 114       Assessment:     Problem List Items Addressed This Visit          Cardiac/Vascular    Heart transplanted - Primary (Chronic)       Renal/    CKD (chronic kidney disease) stage 4, GFR 15-29 ml/min (Chronic)       Immunology/Multi System    Immunocompromised patient       Oncology    Anemia associated with stage 4 chronic renal failure (Chronic)    Relevant Orders    CBC Auto Differential    Comprehensive Metabolic Panel    Ferritin    Iron and TIBC    CBC Auto Differential    Ductal carcinoma in situ (DCIS) of left breast    Secondary and unspecified malignant neoplasm of axilla and upper limb lymph nodes     Plan:     Ductal carcinoma in situ (DCIS) of left breast  --continue follow up with radiation oncology and survivorship  --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 She was treated with 1 cycle of systemic chemotherapy cytoxan and taxotere and udenyca that was discontinued due to toxicity. She had radiation, completed 4/14/22  --R abnormal mammogram and Bx-benign.    --continue to follow with breast surg, will have bilat diag mammo/exam in 4/2024 -seeing them in 4/18/24  --[facility] diagnostic mammo 4/10/2023- wnl    Iron deficiency anemia, unspecified iron deficiency anemia type  --iron: 135, sat: 41%, ferritin: 97- WNL  --continue to monitor    Anemia associated with stage 4 chronic renal failure  --hgb:9.0, hmt:27.8  --hgb ranges from 9-11g/dL.   --epo 20kU for hgb<10g/dL  --last epo 3/18/24  --kidney function consistent       Immunocompromised patient  --on immunosuppressive medications  --continue to monitor  --following up with transplant   --WBC: 4.47     Follow-Up: epo today and again  in 2 weeks. Then cbc monthly with possible epo. 4 months with cbc cmp iron/tibc ferritin prior     Kelsie Burk PA-C  Hematology Oncology    Route Chart for Scheduling    Med Onc Chart Routing      Follow up with physician    Follow up with LIA . 4 months with cbc cmp iron/tibc ferritin prior   Infusion scheduling note   epo today and again in 2 weeks. then epo monthly.   Injection scheduling note    Labs CMP, ferritin, CBC and iron and TIBC   Scheduling:  Preferred lab:  Lab interval:  cbc monthly   Imaging    Pharmacy appointment    Other referrals                    Therapy Plan Information  epoetin tristan-epbx injection 20,000 Units  20,000 Units, Subcutaneous, PRN

## 2024-04-05 ENCOUNTER — LAB VISIT (OUTPATIENT)
Dept: LAB | Facility: HOSPITAL | Age: 69
End: 2024-04-05
Payer: MEDICARE

## 2024-04-05 DIAGNOSIS — D63.1 ANEMIA ASSOCIATED WITH STAGE 4 CHRONIC RENAL FAILURE: Chronic | ICD-10-CM

## 2024-04-05 DIAGNOSIS — N18.4 CKD (CHRONIC KIDNEY DISEASE) STAGE 4, GFR 15-29 ML/MIN: Chronic | ICD-10-CM

## 2024-04-05 DIAGNOSIS — N18.4 ANEMIA ASSOCIATED WITH STAGE 4 CHRONIC RENAL FAILURE: Chronic | ICD-10-CM

## 2024-04-05 LAB
ALBUMIN SERPL BCP-MCNC: 3.3 G/DL (ref 3.5–5.2)
ALP SERPL-CCNC: 97 U/L (ref 55–135)
ALT SERPL W/O P-5'-P-CCNC: 21 U/L (ref 10–44)
ANION GAP SERPL CALC-SCNC: 11 MMOL/L (ref 8–16)
AST SERPL-CCNC: 31 U/L (ref 10–40)
BASOPHILS # BLD AUTO: 0.03 K/UL (ref 0–0.2)
BASOPHILS NFR BLD: 0.7 % (ref 0–1.9)
BILIRUB SERPL-MCNC: 0.5 MG/DL (ref 0.1–1)
BUN SERPL-MCNC: 45 MG/DL (ref 8–23)
CALCIUM SERPL-MCNC: 9.1 MG/DL (ref 8.7–10.5)
CHLORIDE SERPL-SCNC: 111 MMOL/L (ref 95–110)
CO2 SERPL-SCNC: 19 MMOL/L (ref 23–29)
CREAT SERPL-MCNC: 3 MG/DL (ref 0.5–1.4)
DIFFERENTIAL METHOD BLD: ABNORMAL
EOSINOPHIL # BLD AUTO: 0.2 K/UL (ref 0–0.5)
EOSINOPHIL NFR BLD: 3.4 % (ref 0–8)
ERYTHROCYTE [DISTWIDTH] IN BLOOD BY AUTOMATED COUNT: 15.5 % (ref 11.5–14.5)
EST. GFR  (NO RACE VARIABLE): 16 ML/MIN/1.73 M^2
FERRITIN SERPL-MCNC: 114 NG/ML (ref 20–300)
GLUCOSE SERPL-MCNC: 107 MG/DL (ref 70–110)
HCT VFR BLD AUTO: 27.8 % (ref 37–48.5)
HGB BLD-MCNC: 9 G/DL (ref 12–16)
IMM GRANULOCYTES # BLD AUTO: 0.02 K/UL (ref 0–0.04)
IMM GRANULOCYTES NFR BLD AUTO: 0.4 % (ref 0–0.5)
IRON SERPL-MCNC: 67 UG/DL (ref 30–160)
LYMPHOCYTES # BLD AUTO: 1 K/UL (ref 1–4.8)
LYMPHOCYTES NFR BLD: 21.3 % (ref 18–48)
MCH RBC QN AUTO: 28.4 PG (ref 27–31)
MCHC RBC AUTO-ENTMCNC: 32.4 G/DL (ref 32–36)
MCV RBC AUTO: 88 FL (ref 82–98)
MONOCYTES # BLD AUTO: 0.5 K/UL (ref 0.3–1)
MONOCYTES NFR BLD: 11.9 % (ref 4–15)
NEUTROPHILS # BLD AUTO: 2.8 K/UL (ref 1.8–7.7)
NEUTROPHILS NFR BLD: 62.3 % (ref 38–73)
NRBC BLD-RTO: 0 /100 WBC
PLATELET # BLD AUTO: 192 K/UL (ref 150–450)
PMV BLD AUTO: 9.2 FL (ref 9.2–12.9)
POTASSIUM SERPL-SCNC: 4.6 MMOL/L (ref 3.5–5.1)
PROT SERPL-MCNC: 7.6 G/DL (ref 6–8.4)
RBC # BLD AUTO: 3.17 M/UL (ref 4–5.4)
SATURATED IRON: 24 % (ref 20–50)
SODIUM SERPL-SCNC: 141 MMOL/L (ref 136–145)
TOTAL IRON BINDING CAPACITY: 277 UG/DL (ref 250–450)
TRANSFERRIN SERPL-MCNC: 187 MG/DL (ref 200–375)
WBC # BLD AUTO: 4.47 K/UL (ref 3.9–12.7)

## 2024-04-05 PROCEDURE — 82728 ASSAY OF FERRITIN: CPT | Mod: HCNC

## 2024-04-05 PROCEDURE — 80053 COMPREHEN METABOLIC PANEL: CPT | Mod: HCNC

## 2024-04-05 PROCEDURE — 83540 ASSAY OF IRON: CPT | Mod: HCNC

## 2024-04-05 PROCEDURE — 36415 COLL VENOUS BLD VENIPUNCTURE: CPT | Mod: HCNC

## 2024-04-05 PROCEDURE — 85025 COMPLETE CBC W/AUTO DIFF WBC: CPT | Mod: HCNC

## 2024-04-08 ENCOUNTER — OFFICE VISIT (OUTPATIENT)
Dept: HEMATOLOGY/ONCOLOGY | Facility: CLINIC | Age: 69
End: 2024-04-08
Payer: MEDICARE

## 2024-04-08 ENCOUNTER — INFUSION (OUTPATIENT)
Dept: INFUSION THERAPY | Facility: HOSPITAL | Age: 69
End: 2024-04-08
Attending: INTERNAL MEDICINE
Payer: MEDICARE

## 2024-04-08 VITALS
DIASTOLIC BLOOD PRESSURE: 77 MMHG | WEIGHT: 158.75 LBS | RESPIRATION RATE: 20 BRPM | OXYGEN SATURATION: 98 % | HEIGHT: 62 IN | TEMPERATURE: 98 F | OXYGEN SATURATION: 98 % | RESPIRATION RATE: 18 BRPM | SYSTOLIC BLOOD PRESSURE: 123 MMHG | DIASTOLIC BLOOD PRESSURE: 80 MMHG | HEART RATE: 95 BPM | TEMPERATURE: 98 F | HEART RATE: 97 BPM | BODY MASS INDEX: 29.21 KG/M2 | SYSTOLIC BLOOD PRESSURE: 139 MMHG

## 2024-04-08 DIAGNOSIS — N18.4 CKD (CHRONIC KIDNEY DISEASE) STAGE 4, GFR 15-29 ML/MIN: Chronic | ICD-10-CM

## 2024-04-08 DIAGNOSIS — M81.8 OTHER OSTEOPOROSIS WITHOUT CURRENT PATHOLOGICAL FRACTURE: Primary | ICD-10-CM

## 2024-04-08 DIAGNOSIS — D63.1 ANEMIA ASSOCIATED WITH STAGE 4 CHRONIC RENAL FAILURE: Chronic | ICD-10-CM

## 2024-04-08 DIAGNOSIS — D63.1 ANEMIA ASSOCIATED WITH STAGE 4 CHRONIC RENAL FAILURE: ICD-10-CM

## 2024-04-08 DIAGNOSIS — C77.3 SECONDARY AND UNSPECIFIED MALIGNANT NEOPLASM OF AXILLA AND UPPER LIMB LYMPH NODES: ICD-10-CM

## 2024-04-08 DIAGNOSIS — Z94.1 HEART TRANSPLANTED: Primary | Chronic | ICD-10-CM

## 2024-04-08 DIAGNOSIS — D05.12 DUCTAL CARCINOMA IN SITU (DCIS) OF LEFT BREAST: ICD-10-CM

## 2024-04-08 DIAGNOSIS — N18.4 ANEMIA ASSOCIATED WITH STAGE 4 CHRONIC RENAL FAILURE: Chronic | ICD-10-CM

## 2024-04-08 DIAGNOSIS — N18.4 ANEMIA ASSOCIATED WITH STAGE 4 CHRONIC RENAL FAILURE: ICD-10-CM

## 2024-04-08 DIAGNOSIS — D84.9 IMMUNOCOMPROMISED PATIENT: ICD-10-CM

## 2024-04-08 PROCEDURE — 1101F PT FALLS ASSESS-DOCD LE1/YR: CPT | Mod: HCNC,CPTII,S$GLB,

## 2024-04-08 PROCEDURE — 99999 PR PBB SHADOW E&M-EST. PATIENT-LVL V: CPT | Mod: PBBFAC,HCNC,,

## 2024-04-08 PROCEDURE — 99214 OFFICE O/P EST MOD 30 MIN: CPT | Mod: HCNC,S$GLB,,

## 2024-04-08 PROCEDURE — 3074F SYST BP LT 130 MM HG: CPT | Mod: HCNC,CPTII,S$GLB,

## 2024-04-08 PROCEDURE — 1126F AMNT PAIN NOTED NONE PRSNT: CPT | Mod: HCNC,CPTII,S$GLB,

## 2024-04-08 PROCEDURE — 63600175 PHARM REV CODE 636 W HCPCS: Mod: JZ,EC,JG,HCNC

## 2024-04-08 PROCEDURE — 96372 THER/PROPH/DIAG INJ SC/IM: CPT | Mod: HCNC

## 2024-04-08 PROCEDURE — 3078F DIAST BP <80 MM HG: CPT | Mod: HCNC,CPTII,S$GLB,

## 2024-04-08 PROCEDURE — 3288F FALL RISK ASSESSMENT DOCD: CPT | Mod: HCNC,CPTII,S$GLB,

## 2024-04-08 PROCEDURE — 1157F ADVNC CARE PLAN IN RCRD: CPT | Mod: HCNC,CPTII,S$GLB,

## 2024-04-08 PROCEDURE — 3008F BODY MASS INDEX DOCD: CPT | Mod: HCNC,CPTII,S$GLB,

## 2024-04-08 PROCEDURE — 1159F MED LIST DOCD IN RCRD: CPT | Mod: HCNC,CPTII,S$GLB,

## 2024-04-08 RX ADMIN — EPOETIN ALFA-EPBX 20000 UNITS: 10000 INJECTION, SOLUTION INTRAVENOUS; SUBCUTANEOUS at 01:04

## 2024-04-15 DIAGNOSIS — G47.00 INSOMNIA, UNSPECIFIED TYPE: ICD-10-CM

## 2024-04-15 DIAGNOSIS — M79.7 FIBROMYALGIA: ICD-10-CM

## 2024-04-15 RX ORDER — TEMAZEPAM 30 MG/1
30 CAPSULE ORAL NIGHTLY PRN
Qty: 90 CAPSULE | Refills: 0 | Status: SHIPPED | OUTPATIENT
Start: 2024-04-15 | End: 2024-07-14

## 2024-04-16 ENCOUNTER — OFFICE VISIT (OUTPATIENT)
Dept: UROLOGY | Facility: CLINIC | Age: 69
End: 2024-04-16
Payer: MEDICARE

## 2024-04-16 VITALS
BODY MASS INDEX: 29.7 KG/M2 | HEART RATE: 95 BPM | DIASTOLIC BLOOD PRESSURE: 76 MMHG | HEIGHT: 62 IN | WEIGHT: 161.38 LBS | SYSTOLIC BLOOD PRESSURE: 129 MMHG

## 2024-04-16 DIAGNOSIS — N30.00 ACUTE CYSTITIS WITHOUT HEMATURIA: ICD-10-CM

## 2024-04-16 DIAGNOSIS — N32.81 OAB (OVERACTIVE BLADDER): Primary | ICD-10-CM

## 2024-04-16 LAB
BILIRUB UR QL STRIP: NEGATIVE
GLUCOSE UR QL STRIP: NEGATIVE
KETONES UR QL STRIP: NEGATIVE
LEUKOCYTE ESTERASE UR QL STRIP: POSITIVE
PH, POC UA: 5.5
POC BLOOD, URINE: NEGATIVE
POC NITRATES, URINE: POSITIVE
POC RESIDUAL URINE VOLUME: 7 ML (ref 0–100)
PROT UR QL STRIP: POSITIVE
SP GR UR STRIP: 1.01 (ref 1–1.03)
UROBILINOGEN UR STRIP-ACNC: 0.2 (ref 0.1–1.1)

## 2024-04-16 PROCEDURE — 99999 PR PBB SHADOW E&M-EST. PATIENT-LVL V: CPT | Mod: PBBFAC,HCNC,, | Performed by: NURSE PRACTITIONER

## 2024-04-16 PROCEDURE — 87186 SC STD MICRODIL/AGAR DIL: CPT | Mod: HCNC | Performed by: NURSE PRACTITIONER

## 2024-04-16 PROCEDURE — 87088 URINE BACTERIA CULTURE: CPT | Mod: HCNC | Performed by: NURSE PRACTITIONER

## 2024-04-16 PROCEDURE — 1126F AMNT PAIN NOTED NONE PRSNT: CPT | Mod: HCNC,CPTII,S$GLB, | Performed by: NURSE PRACTITIONER

## 2024-04-16 PROCEDURE — 99214 OFFICE O/P EST MOD 30 MIN: CPT | Mod: HCNC,S$GLB,, | Performed by: NURSE PRACTITIONER

## 2024-04-16 PROCEDURE — 51798 US URINE CAPACITY MEASURE: CPT | Mod: HCNC,S$GLB,, | Performed by: NURSE PRACTITIONER

## 2024-04-16 PROCEDURE — 1101F PT FALLS ASSESS-DOCD LE1/YR: CPT | Mod: HCNC,CPTII,S$GLB, | Performed by: NURSE PRACTITIONER

## 2024-04-16 PROCEDURE — 3008F BODY MASS INDEX DOCD: CPT | Mod: HCNC,CPTII,S$GLB, | Performed by: NURSE PRACTITIONER

## 2024-04-16 PROCEDURE — 1159F MED LIST DOCD IN RCRD: CPT | Mod: HCNC,CPTII,S$GLB, | Performed by: NURSE PRACTITIONER

## 2024-04-16 PROCEDURE — 3288F FALL RISK ASSESSMENT DOCD: CPT | Mod: HCNC,CPTII,S$GLB, | Performed by: NURSE PRACTITIONER

## 2024-04-16 PROCEDURE — 81003 URINALYSIS AUTO W/O SCOPE: CPT | Mod: QW,HCNC,S$GLB, | Performed by: NURSE PRACTITIONER

## 2024-04-16 PROCEDURE — 87086 URINE CULTURE/COLONY COUNT: CPT | Mod: HCNC | Performed by: NURSE PRACTITIONER

## 2024-04-16 PROCEDURE — 87077 CULTURE AEROBIC IDENTIFY: CPT | Mod: HCNC | Performed by: NURSE PRACTITIONER

## 2024-04-16 PROCEDURE — 3078F DIAST BP <80 MM HG: CPT | Mod: HCNC,CPTII,S$GLB, | Performed by: NURSE PRACTITIONER

## 2024-04-16 PROCEDURE — 3074F SYST BP LT 130 MM HG: CPT | Mod: HCNC,CPTII,S$GLB, | Performed by: NURSE PRACTITIONER

## 2024-04-16 PROCEDURE — 1157F ADVNC CARE PLAN IN RCRD: CPT | Mod: HCNC,CPTII,S$GLB, | Performed by: NURSE PRACTITIONER

## 2024-04-16 PROCEDURE — 1160F RVW MEDS BY RX/DR IN RCRD: CPT | Mod: HCNC,CPTII,S$GLB, | Performed by: NURSE PRACTITIONER

## 2024-04-16 NOTE — PROGRESS NOTES
Chief Complaint:   OAB  Urge incontinence    HPI:   Patient is presenting as a follow-up to VESIcare.  States that VESIcare and Myrbetriq did not decrease her urge incontinence.  Nocturia remains at 2 times nightly.  Urine in clinic indicated nitrates and leukocytes, all other parameters were negative.  Patient is currently asymptomatic secondary to possible urinary tract infection.  PVR was 13 mL.  11/01/2024  Patient is presenting as a follow-up to overactive bladder.  Patient is currently on VESIcare 5 mg once nightly and reports a 75 % decrease in overactive bladder symptoms.  Denies adverse side effects urine in clinic is negative and PVR is 14 mL.  11/29/2023  Patient is a 68-year-old female that is presenting as a follow-up to overactive bladder.  Patient has urge incontinence and is wearing a pad that she changes several times a day.  She was prescribed Myrbetriq, however, was hospitalized secondary to an increase in potassium.  Patient reports that primary care provider discontinued Myrbetriq secondary to possible interaction related to increase potassium level.  Patient states that medication decreasing her urge incontinence and daytime frequency.  Is requesting a new medication or treatment option.   Allergies:  Lisinopril, Augmentin [amoxicillin-pot clavulanate], and Zyvox [linezolid]    Medications:  has a current medication list which includes the following prescription(s): albuterol, benzonatate, biotin, calcitonin (salmon), carvedilol, cyclosporine modified (neoral), ergocalciferol, evening primrose oil, fluticasone propionate, furosemide, guaifenesin, guaifenesin-codeine 100-10 mg/5 ml, hydralazine, hydrocortisone, lidocaine, loratadine, multivitamin, nifedipine, pantoprazole, patiromer calcium sorbitex, polyethylene glycol, pregabalin, psyllium husk, retacrit, temazepam, UNABLE TO FIND, vitamin e, and zolpidem, and the following Facility-Administered Medications: acetaminophen and  famotidine.    Review of Systems:  General: No fever, chills, fatigability, or weight loss.  Skin: No rashes, itching, or changes in color or texture of skin.  Chest: Denies DONOHUE, cyanosis, wheezing, cough, and sputum production.  Abdomen: Appetite fine. No weight loss. Denies diarrhea, abdominal pain, hematemesis, or blood in stool.  Musculoskeletal: No joint stiffness or swelling. Denies back pain.  : As above.  All other review of systems negative.    PMH:   has a past medical history of Abdominal wall hernia, Abnormal mammogram (10/12/2021), GRACE (acute kidney injury) (11/22/2021), Anxiety, Arthritis, Breast cancer in female (08/2021), C. 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    PSH:   has a past surgical history that includes Cardiac pacemaker removal (Left, 06/26/2014); Bladder surgery (2015 approx); Carpal tunnel release (Left, 03/03/2015); Hysterectomy (1983); Hernia repair (Right, 1971 approx); Breast surgery (Left, 09/28/2015); Breast  surgery (Right, 12/2015); Toe Surgery; Colonoscopy (N/A, 02/25/2021); Breast biopsy (Bilateral); Heart transplant (1993); [place] lymph node biopsy (Left, 10/12/2021); Insertion of tunneled central venous catheter (CVC) with subcutaneous port (N/A, 11/09/2021); Incision and drainage of abscess (Left, 12/24/2021); Removal of vascular access port; Breast lumpectomy (Left, 2021); Injection of anesthetic agent into sacroiliac joint (Right, 08/22/2022); Injection of anesthetic agent around medial branch nerves innervating lumbar facet joint (Right, 10/19/2022); Injection of anesthetic agent around medial branch nerves innervating lumbar facet joint (Right, 11/09/2022); Breast biopsy (Right, 10/31/2022); Radiofrequency thermocoagulation (Right, 12/07/2022); Epidural steroid injection into cervical spine (N/A, 02/02/2023); Cystocele repair; Robot-assisted laparoscopic abdominal sacrocolpopexy (N/A, 8/10/2023); xi robotic urethropexy (N/A, 8/10/2023); and Robot-assisted laparoscopic oophorectomy (Right, 8/10/2023).    FamHx: family history includes Breast cancer in her maternal grandmother and mother; Cancer (age of onset: 38) in her mother; Cataracts in her cousin; Heart disease in her maternal grandmother; Hypertension in her son.    SocHx:  reports that she has never smoked. She has never been exposed to tobacco smoke. She has never used smokeless tobacco. She reports that she does not drink alcohol and does not use drugs.      Physical Exam:  Vitals:    04/16/24 1356   BP: 129/76   Pulse: 95     General: A&Ox3, no apparent distress, no deformities  Neck: No masses, normal thyroid  Lungs: normal inspiration, no use of accessory muscles  Heart: normal pulse, no arrhythmias  Abdomen: Soft, NT, ND, no masses, no hernias, no hepatosplenomegaly  Lymphatic: Neck and groin nodes negative  Skin: The skin is warm and dry. No jaundice.    Labs/Studies:   See HPI  Impression/Plan:   1. Overactive bladder  Patient has tried and  failed to overactive bladder medications, therefore, it is appropriate to refer her to MD for possible Botox or InterStim.    2. Possible urinary tract infection  Urine was sent for culture and patient will be contacted with results and needed follow-up.

## 2024-04-18 ENCOUNTER — OFFICE VISIT (OUTPATIENT)
Dept: SURGERY | Facility: CLINIC | Age: 69
End: 2024-04-18
Payer: MEDICARE

## 2024-04-18 ENCOUNTER — HOSPITAL ENCOUNTER (OUTPATIENT)
Dept: RADIOLOGY | Facility: HOSPITAL | Age: 69
Discharge: HOME | End: 2024-04-18
Attending: NURSE PRACTITIONER
Payer: MEDICARE

## 2024-04-18 VITALS — HEIGHT: 62 IN | WEIGHT: 160.25 LBS | BODY MASS INDEX: 29.49 KG/M2 | RESPIRATION RATE: 16 BRPM

## 2024-04-18 DIAGNOSIS — C77.3 SECONDARY AND UNSPECIFIED MALIGNANT NEOPLASM OF AXILLA AND UPPER LIMB LYMPH NODES: Primary | ICD-10-CM

## 2024-04-18 DIAGNOSIS — D84.9 IMMUNOCOMPROMISED PATIENT: ICD-10-CM

## 2024-04-18 DIAGNOSIS — Z08 ENCOUNTER FOR FOLLOW-UP SURVEILLANCE OF BREAST CANCER: ICD-10-CM

## 2024-04-18 DIAGNOSIS — Z85.3 ENCOUNTER FOR FOLLOW-UP SURVEILLANCE OF BREAST CANCER: ICD-10-CM

## 2024-04-18 DIAGNOSIS — D05.12 DUCTAL CARCINOMA IN SITU (DCIS) OF LEFT BREAST: ICD-10-CM

## 2024-04-18 PROCEDURE — 1160F RVW MEDS BY RX/DR IN RCRD: CPT | Mod: HCNC,CPTII,S$GLB, | Performed by: NURSE PRACTITIONER

## 2024-04-18 PROCEDURE — 1159F MED LIST DOCD IN RCRD: CPT | Mod: HCNC,CPTII,S$GLB, | Performed by: NURSE PRACTITIONER

## 2024-04-18 PROCEDURE — 1157F ADVNC CARE PLAN IN RCRD: CPT | Mod: HCNC,CPTII,S$GLB, | Performed by: NURSE PRACTITIONER

## 2024-04-18 PROCEDURE — 1101F PT FALLS ASSESS-DOCD LE1/YR: CPT | Mod: HCNC,CPTII,S$GLB, | Performed by: NURSE PRACTITIONER

## 2024-04-18 PROCEDURE — 3008F BODY MASS INDEX DOCD: CPT | Mod: HCNC,CPTII,S$GLB, | Performed by: NURSE PRACTITIONER

## 2024-04-18 PROCEDURE — 77062 BREAST TOMOSYNTHESIS BI: CPT | Mod: TC,HCNC

## 2024-04-18 PROCEDURE — 99999 PR PBB SHADOW E&M-EST. PATIENT-LVL IV: CPT | Mod: PBBFAC,HCNC,, | Performed by: NURSE PRACTITIONER

## 2024-04-18 PROCEDURE — 3288F FALL RISK ASSESSMENT DOCD: CPT | Mod: HCNC,CPTII,S$GLB, | Performed by: NURSE PRACTITIONER

## 2024-04-18 PROCEDURE — 99214 OFFICE O/P EST MOD 30 MIN: CPT | Mod: HCNC,S$GLB,, | Performed by: NURSE PRACTITIONER

## 2024-04-18 PROCEDURE — 77062 BREAST TOMOSYNTHESIS BI: CPT | Mod: 26,HCNC,, | Performed by: RADIOLOGY

## 2024-04-18 PROCEDURE — 1126F AMNT PAIN NOTED NONE PRSNT: CPT | Mod: HCNC,CPTII,S$GLB, | Performed by: NURSE PRACTITIONER

## 2024-04-18 PROCEDURE — 77066 DX MAMMO INCL CAD BI: CPT | Mod: 26,HCNC,, | Performed by: RADIOLOGY

## 2024-04-18 RX ORDER — SULFAMETHOXAZOLE AND TRIMETHOPRIM 800; 160 MG/1; MG/1
1 TABLET ORAL 2 TIMES DAILY
Qty: 20 TABLET | Refills: 0 | Status: SHIPPED | OUTPATIENT
Start: 2024-04-18 | End: 2024-04-20 | Stop reason: ALTCHOICE

## 2024-04-19 LAB — BACTERIA UR CULT: ABNORMAL

## 2024-04-20 ENCOUNTER — DOCUMENTATION ONLY (OUTPATIENT)
Dept: PRIMARY CARE CLINIC | Facility: CLINIC | Age: 69
End: 2024-04-20
Payer: MEDICARE

## 2024-04-20 ENCOUNTER — TELEPHONE (OUTPATIENT)
Dept: PRIMARY CARE CLINIC | Facility: CLINIC | Age: 69
End: 2024-04-20
Payer: MEDICARE

## 2024-04-20 RX ORDER — CIPROFLOXACIN 500 MG/1
500 TABLET ORAL DAILY
Qty: 7 TABLET | Refills: 0 | Status: SHIPPED | OUTPATIENT
Start: 2024-04-20 | End: 2024-05-13

## 2024-04-20 NOTE — PROGRESS NOTES
Left voicemail and will send patient portal message  Urine culture 4/16/24 [facility]BSIELLA AEROGENES   Sensitive to Bactrim - pt prescribed Bactim DS by NP Rushing  Given Ms. Damon CKD stage 4 (with calculated crcl of 8 mL/min)  recommend to avoid use of Bactrim.  Have advised Ms. Damon to discontinue Bactrim.  Will send new prescription for ciprofloxacin 500 mg Qd x 7 days.

## 2024-04-20 NOTE — TELEPHONE ENCOUNTER
Pt returned my call - reports mild dysuria, straining, cloudy urine.  No systemic symptoms such as fever, nausea.  Had not yet picked up Bactrim prescription.  Plans to  Cipro prescription tonight or tomorrow morning.

## 2024-04-22 ENCOUNTER — TELEPHONE (OUTPATIENT)
Dept: PRIMARY CARE CLINIC | Facility: CLINIC | Age: 69
End: 2024-04-22
Payer: MEDICARE

## 2024-04-22 ENCOUNTER — INFUSION (OUTPATIENT)
Dept: INFUSION THERAPY | Facility: HOSPITAL | Age: 69
End: 2024-04-22
Payer: MEDICARE

## 2024-04-22 VITALS
TEMPERATURE: 97 F | DIASTOLIC BLOOD PRESSURE: 80 MMHG | HEART RATE: 96 BPM | RESPIRATION RATE: 18 BRPM | SYSTOLIC BLOOD PRESSURE: 139 MMHG

## 2024-04-22 DIAGNOSIS — M81.8 OTHER OSTEOPOROSIS WITHOUT CURRENT PATHOLOGICAL FRACTURE: Primary | ICD-10-CM

## 2024-04-22 DIAGNOSIS — D63.1 ANEMIA ASSOCIATED WITH STAGE 4 CHRONIC RENAL FAILURE: ICD-10-CM

## 2024-04-22 DIAGNOSIS — N18.4 ANEMIA ASSOCIATED WITH STAGE 4 CHRONIC RENAL FAILURE: ICD-10-CM

## 2024-04-22 PROCEDURE — 96372 THER/PROPH/DIAG INJ SC/IM: CPT | Mod: HCNC

## 2024-04-22 PROCEDURE — 63600175 PHARM REV CODE 636 W HCPCS: Mod: JZ,EC,JG,HCNC

## 2024-04-22 RX ORDER — LIDOCAINE 50 MG/G
PATCH TOPICAL
Qty: 60 PATCH | Refills: 5 | Status: SHIPPED | OUTPATIENT
Start: 2024-04-22

## 2024-04-22 RX ADMIN — EPOETIN ALFA-EPBX 20000 UNITS: 10000 INJECTION, SOLUTION INTRAVENOUS; SUBCUTANEOUS at 02:04

## 2024-04-22 NOTE — TELEPHONE ENCOUNTER
Spoke with pt and she stated that she spoke with provider and was aware of medication change.    SJ

## 2024-04-27 RX ORDER — PREGABALIN 50 MG/1
50 CAPSULE ORAL 2 TIMES DAILY
Qty: 180 CAPSULE | Refills: 0 | Status: SHIPPED | OUTPATIENT
Start: 2024-04-27 | End: 2024-07-26

## 2024-04-30 RX ORDER — NIFEDIPINE 30 MG/1
30 TABLET, EXTENDED RELEASE ORAL
Qty: 90 TABLET | Refills: 3 | Status: SHIPPED | OUTPATIENT
Start: 2024-04-30

## 2024-05-01 ENCOUNTER — OFFICE VISIT (OUTPATIENT)
Dept: UROLOGY | Facility: CLINIC | Age: 69
End: 2024-05-01
Payer: MEDICARE

## 2024-05-01 ENCOUNTER — OFFICE VISIT (OUTPATIENT)
Dept: PRIMARY CARE CLINIC | Facility: CLINIC | Age: 69
End: 2024-05-01
Payer: MEDICARE

## 2024-05-01 VITALS
DIASTOLIC BLOOD PRESSURE: 80 MMHG | WEIGHT: 156.5 LBS | BODY MASS INDEX: 28.8 KG/M2 | TEMPERATURE: 99 F | HEIGHT: 62 IN | HEART RATE: 60 BPM | OXYGEN SATURATION: 98 % | SYSTOLIC BLOOD PRESSURE: 136 MMHG

## 2024-05-01 VITALS
DIASTOLIC BLOOD PRESSURE: 86 MMHG | RESPIRATION RATE: 16 BRPM | SYSTOLIC BLOOD PRESSURE: 129 MMHG | WEIGHT: 160.25 LBS | HEART RATE: 90 BPM | BODY MASS INDEX: 29.31 KG/M2

## 2024-05-01 DIAGNOSIS — N39.41 URGE INCONTINENCE OF URINE: ICD-10-CM

## 2024-05-01 DIAGNOSIS — I10 ESSENTIAL HYPERTENSION: Chronic | ICD-10-CM

## 2024-05-01 DIAGNOSIS — Z94.1 HEART TRANSPLANTED: Chronic | ICD-10-CM

## 2024-05-01 DIAGNOSIS — N30.00 ACUTE CYSTITIS WITHOUT HEMATURIA: ICD-10-CM

## 2024-05-01 DIAGNOSIS — N39.3 SUI (STRESS URINARY INCONTINENCE, FEMALE): ICD-10-CM

## 2024-05-01 DIAGNOSIS — N99.3 PROLAPSE OF VAGINAL CUFF AFTER HYSTERECTOMY: ICD-10-CM

## 2024-05-01 DIAGNOSIS — N39.41 URGE INCONTINENCE OF URINE: Primary | ICD-10-CM

## 2024-05-01 DIAGNOSIS — E55.9 VITAMIN D DEFICIENCY: Primary | ICD-10-CM

## 2024-05-01 DIAGNOSIS — R94.6 BORDERLINE ABNORMAL TFTS: ICD-10-CM

## 2024-05-01 DIAGNOSIS — F51.01 PRIMARY INSOMNIA: ICD-10-CM

## 2024-05-01 DIAGNOSIS — M81.8 OTHER OSTEOPOROSIS WITHOUT CURRENT PATHOLOGICAL FRACTURE: ICD-10-CM

## 2024-05-01 LAB
25(OH)D3+25(OH)D2 SERPL-MCNC: 45 NG/ML (ref 30–96)
BILIRUB SERPL-MCNC: NORMAL MG/DL
BLOOD URINE, POC: NORMAL
CLARITY, POC UA: CLEAR
COLOR, POC UA: YELLOW
CREAT UR-MCNC: 62 MG/DL (ref 15–325)
GLUCOSE UR QL STRIP: NORMAL
KETONES UR QL STRIP: 5
LEUKOCYTE ESTERASE URINE, POC: NORMAL
NITRITE, POC UA: NORMAL
PH, POC UA: 5
PROTEIN, POC: >2000
SPECIFIC GRAVITY, POC UA: 1.01
T4 FREE SERPL-MCNC: 0.97 NG/DL (ref 0.71–1.51)
TSH SERPL DL<=0.005 MIU/L-ACNC: 8.11 UIU/ML (ref 0.4–4)
UROBILINOGEN, POC UA: 0.2

## 2024-05-01 PROCEDURE — 3008F BODY MASS INDEX DOCD: CPT | Mod: HCNC,CPTII,S$GLB, | Performed by: UROLOGY

## 2024-05-01 PROCEDURE — 99215 OFFICE O/P EST HI 40 MIN: CPT | Mod: HCNC,S$GLB,, | Performed by: INTERNAL MEDICINE

## 2024-05-01 PROCEDURE — 99999 PR PBB SHADOW E&M-EST. PATIENT-LVL V: CPT | Mod: PBBFAC,HCNC,, | Performed by: INTERNAL MEDICINE

## 2024-05-01 PROCEDURE — 3008F BODY MASS INDEX DOCD: CPT | Mod: HCNC,CPTII,S$GLB, | Performed by: INTERNAL MEDICINE

## 2024-05-01 PROCEDURE — 82570 ASSAY OF URINE CREATININE: CPT | Mod: HCNC | Performed by: INTERNAL MEDICINE

## 2024-05-01 PROCEDURE — 1157F ADVNC CARE PLAN IN RCRD: CPT | Mod: HCNC,CPTII,S$GLB, | Performed by: UROLOGY

## 2024-05-01 PROCEDURE — 99214 OFFICE O/P EST MOD 30 MIN: CPT | Mod: HCNC,S$GLB,, | Performed by: UROLOGY

## 2024-05-01 PROCEDURE — 82306 VITAMIN D 25 HYDROXY: CPT | Mod: HCNC | Performed by: INTERNAL MEDICINE

## 2024-05-01 PROCEDURE — 84439 ASSAY OF FREE THYROXINE: CPT | Mod: HCNC | Performed by: INTERNAL MEDICINE

## 2024-05-01 PROCEDURE — 3288F FALL RISK ASSESSMENT DOCD: CPT | Mod: HCNC,CPTII,S$GLB, | Performed by: INTERNAL MEDICINE

## 2024-05-01 PROCEDURE — 1101F PT FALLS ASSESS-DOCD LE1/YR: CPT | Mod: HCNC,CPTII,S$GLB, | Performed by: INTERNAL MEDICINE

## 2024-05-01 PROCEDURE — 3066F NEPHROPATHY DOC TX: CPT | Mod: HCNC,CPTII,S$GLB, | Performed by: INTERNAL MEDICINE

## 2024-05-01 PROCEDURE — 99999 PR PBB SHADOW E&M-EST. PATIENT-LVL III: CPT | Mod: PBBFAC,HCNC,, | Performed by: UROLOGY

## 2024-05-01 PROCEDURE — 1157F ADVNC CARE PLAN IN RCRD: CPT | Mod: HCNC,CPTII,S$GLB, | Performed by: INTERNAL MEDICINE

## 2024-05-01 PROCEDURE — 3079F DIAST BP 80-89 MM HG: CPT | Mod: HCNC,CPTII,S$GLB, | Performed by: UROLOGY

## 2024-05-01 PROCEDURE — 3079F DIAST BP 80-89 MM HG: CPT | Mod: HCNC,CPTII,S$GLB, | Performed by: INTERNAL MEDICINE

## 2024-05-01 PROCEDURE — 84443 ASSAY THYROID STIM HORMONE: CPT | Mod: HCNC | Performed by: INTERNAL MEDICINE

## 2024-05-01 PROCEDURE — 81002 URINALYSIS NONAUTO W/O SCOPE: CPT | Mod: HCNC,S$GLB,, | Performed by: INTERNAL MEDICINE

## 2024-05-01 PROCEDURE — 3074F SYST BP LT 130 MM HG: CPT | Mod: HCNC,CPTII,S$GLB, | Performed by: UROLOGY

## 2024-05-01 PROCEDURE — 3075F SYST BP GE 130 - 139MM HG: CPT | Mod: HCNC,CPTII,S$GLB, | Performed by: INTERNAL MEDICINE

## 2024-05-01 RX ORDER — PREGABALIN 50 MG/1
50 CAPSULE ORAL 2 TIMES DAILY
Qty: 20 CAPSULE | Refills: 0 | Status: SHIPPED | OUTPATIENT
Start: 2024-05-01 | End: 2024-05-11

## 2024-05-01 RX ORDER — CYCLOSPORINE 25 MG/1
75 CAPSULE, LIQUID FILLED ORAL 2 TIMES DAILY
Qty: 540 CAPSULE | Refills: 1 | Status: SHIPPED | OUTPATIENT
Start: 2024-05-01 | End: 2024-10-28

## 2024-05-01 RX ORDER — ZOLPIDEM TARTRATE 5 MG/1
5 TABLET ORAL NIGHTLY PRN
Qty: 90 TABLET | Refills: 0 | Status: SHIPPED | OUTPATIENT
Start: 2024-05-01 | End: 2024-07-30

## 2024-05-01 RX ORDER — CARVEDILOL 6.25 MG/1
6.25 TABLET ORAL 2 TIMES DAILY WITH MEALS
Qty: 180 TABLET | Refills: 3 | Status: SHIPPED | OUTPATIENT
Start: 2024-05-01 | End: 2025-05-01

## 2024-05-01 RX ORDER — DIAZEPAM 5 MG/1
5 TABLET ORAL ONCE
Qty: 1 TABLET | Refills: 0 | Status: ON HOLD | OUTPATIENT
Start: 2024-05-01 | End: 2024-06-11 | Stop reason: HOSPADM

## 2024-05-01 RX ORDER — CALCITONIN SALMON 200 [IU]/.09ML
1 SPRAY, METERED NASAL DAILY
Qty: 3 EACH | Refills: 3 | Status: SHIPPED | OUTPATIENT
Start: 2024-05-01 | End: 2024-05-28

## 2024-05-01 NOTE — PROGRESS NOTES
Chief Complaint:   Encounter Diagnoses   Name Primary?    Urge incontinence of urine Yes    FRANKY (stress urinary incontinence, female)     Acute cystitis without hematuria     Prolapse of vaginal cuff after hysterectomy          HPI:   5/1/24- patient is here today for the 1st time to see me from [place], she is here today to discuss surgical management for urge incontinence as she has failed medical management.  No specific evidence of stress incontinence or UTI.    11/29/23- LR- Patient is a 68-year-old female that is presenting as a follow-up to overactive bladder.  Patient has urge incontinence and is wearing a pad that she changes several times a day.  She was prescribed Myrbetriq, however, was hospitalized secondary to an increase in potassium.  Patient reports that primary care provider discontinued Myrbetriq secondary to possible interaction related to increase potassium level.  Patient states that medication decreasing her urge incontinence and daytime frequency.  Is requesting a new medication or treatment option.       Allergies:  Lisinopril, Augmentin [amoxicillin-pot clavulanate], and Zyvox [linezolid]    Medications:  has a current medication list which includes the following prescription(s): albuterol, benzonatate, biotin, calcitonin (salmon), carvedilol, cyclosporine modified (neoral), ergocalciferol, evening primrose oil, fluticasone propionate, furosemide, guaifenesin, hydralazine, hydrocortisone, lidocaine, loratadine, multivitamin, nifedipine, pantoprazole, patiromer calcium sorbitex, polyethylene glycol, pregabalin, psyllium husk, retacrit, temazepam, UNABLE TO FIND, vitamin e, and zolpidem, and the following Facility-Administered Medications: acetaminophen and famotidine.    Review of Systems:  General: No fever, chills, fatigability, or weight loss.  Skin: No rashes, itching, or changes in color or texture of skin.  Chest: Denies DONOHUE, cyanosis, wheezing, cough, and sputum production.  Abdomen:  Appetite fine. No weight loss. Denies diarrhea, abdominal pain, hematemesis, or blood in stool.  Musculoskeletal: No joint stiffness or swelling. Denies back pain.  : As above.  All other review of systems negative.    PMH:   has a past medical history of Abdominal wall hernia, Abnormal mammogram (10/12/2021), GRACE (acute kidney injury) (11/22/2021), Anxiety, Arthritis, Breast cancer in female (08/2021), C. 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    PSH:   has a past surgical history that includes Cardiac pacemaker removal (Left, 06/26/2014); Bladder surgery (2015 approx); Carpal tunnel release (Left, 03/03/2015); Hysterectomy (1983); Hernia repair (Right, 1971 approx); Breast surgery (Left, 09/28/2015); Breast surgery (Right, 12/2015); Toe Surgery; Colonoscopy (N/A, 02/25/2021); Breast biopsy (Bilateral); Heart transplant (1993); [place] lymph node biopsy (Left, 10/12/2021); Insertion of tunneled central venous catheter (CVC) with subcutaneous  port (N/A, 11/09/2021); Incision and drainage of abscess (Left, 12/24/2021); Removal of vascular access port; Breast lumpectomy (Left, 2021); Injection of anesthetic agent into sacroiliac joint (Right, 08/22/2022); Injection of anesthetic agent around medial branch nerves innervating lumbar facet joint (Right, 10/19/2022); Injection of anesthetic agent around medial branch nerves innervating lumbar facet joint (Right, 11/09/2022); Breast biopsy (Right, 10/31/2022); Radiofrequency thermocoagulation (Right, 12/07/2022); Epidural steroid injection into cervical spine (N/A, 02/02/2023); Cystocele repair; Robot-assisted laparoscopic abdominal sacrocolpopexy (N/A, 8/10/2023); xi robotic urethropexy (N/A, 8/10/2023); and Robot-assisted laparoscopic oophorectomy (Right, 8/10/2023).    FamHx: family history includes Breast cancer in her maternal grandmother and mother; Cancer (age of onset: 38) in her mother; Cataracts in her cousin; Heart disease in her maternal grandmother; Hypertension in her son.    SocHx:  reports that she has never smoked. She has never been exposed to tobacco smoke. She has never used smokeless tobacco. She reports that she does not drink alcohol and does not use drugs.      Physical Exam:  There were no vitals filed for this visit.  General: A&Ox3, no apparent distress, no deformities  Neck: No masses, normal ROM  Lungs: normal inspiration, no use of accessory muscles  Heart: normal pulse, no arrhythmias  Abdomen: Soft, NT, ND, no masses, no hernias, no hepatosplenomegaly  Skin: The skin is warm and dry. No jaundice.  Ext: No c/c/e.    Labs/Studies:   None     Impression/Plan:       1. Urge incontinence- patient has tried and failed VESIcare and myrbetriq.  She is ready to pursue surgical management, we discussed options today of Botox and the InterStim.  She would like to go ahead and pursue percutaneous InterStim placement and we will schedule.  Please see below in regards to our discussion today.       Therefore I will arrange for a percutaneous InterStim procedure.  We will use Valium to assist with the procedure.  Patient understands the risks, benefits and alternatives.  These include but are not limited to damage to surrounding structures including spine, nerve tissue.  Risk of infection, chance this will assist.  Understands will have to be removed within a few days.  Understanding the further surgical management may be warranted.  Risk of side effects from the anesthesia.   2. Stress incontinence-  robotic sacrocolpopexy, urethropexy, right oophorectomy OBGYN  8/10/23    No evidence of stress incontinence, nothing further at this juncture.    3. UTI- no evidence of UTI, call with any issues.

## 2024-05-01 NOTE — PATIENT INSTRUCTIONS
If you are feeling unwell, we'd like to be the first ones to know here at [facility]! Please give us a call. Same day appointments are our top priority to keep you well and out of the emergency rooms and hospitals. Call [phone] for our direct line. After hours advice is always available. Please call [phone] after hours to speak to the on-call team.      Sleep Tips  Get outside 5-20 minutes every morning for natural am light exposure  Stay mobile and active during the day  Limit daytime napping  Set a consistent bedtime and bedtime routine  Avoid bright lights and visual electronics at least an hour before scheduled bedtime  If not asleep w/in 20-30 minutes get up and engage in quiet activity until sleepy again and then go back to bed     Check [email or web address].org website for dietary recommendations   Cont efforts to limit sodium and phosphorus intake    Recommend miralax/glycolax daily - can titrate up or down as needed for goal 1-2 soft BM daily

## 2024-05-01 NOTE — PROGRESS NOTES
Nadia Damon  05/01/2024  [id number]    Elis Wick MD  Patient Care Team:  Elis Wick MD as PCP - General (Internal Medicine)  Miguel Soni Jr., MD as Consulting Physician (Vascular Surgery)  Ike King MD as Consulting Physician (Cardiology)  Courtney Tubbs MD as Consulting Physician (Cardiology)  Carter Crawford MD as Consulting Physician (Nephrology)  Paxton Vasques OD as Consulting Physician (Optometry)  PRANAY Villalobos MD as Obstetrician (Obstetrics)  Parker Mccarthy IV, MD (Urology)  Ike King MD as Consulting Physician (Cardiology)  Jose Roland MD as Consulting Physician (Dermatology)  Prasanth Johnson MD as Consulting Physician (Rheumatology)  Elis Wick MD as Physician (Internal Medicine)  Lexi Bianchi LCSW as  (Hematology and Oncology)  Candace Saleh LMSW as  (Hematology and Oncology)  Kelsie Burk PA-C as Physician Assistant (Hematology and Oncology)    Visit Type: Follow-up    Chief Complaint:  Chief Complaint   Patient presents with    Pain     Pt is complaining of body aches      History of Present Illness: History of Present Illness: Ms. Damon is a 68 year old female here w c/o myalgias and general malaise.      Ms. Damon w h/o heart transplant 1993, on anti-rejection medication. 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 She was treated with 1 cycle of systemic chemotherapy cytoxan and taxotere and udenyca that was discontinued due to toxicity. She has been followed by radiation oncology and completed last radiation treatment 5/14/22. She is still followed by Heme/Onc for Epo, initiated for anemia due to stage 4 CKD.     Other medical issues include depresssion/anxiety/insomnia, hypertension, chronic diastolic CHF, and osteoporois for which she is receiving Prolia.      Recently treated for UTI  Urine culture  4/16/24 resulted 4/20/24 KLEBSIELLA AEROGENES   Sensitive to Bactrim - pt prescribed Bactim DS by NP Rushing  Given Ms. Damon CKD stage 4 (with calculated crcl of 8 mL/min)  recommend to avoid use of Bactrim.  I advised Ms. Damon to discontinue Bactrim.  New prescription 4/20/24 for ciprofloxacin 500 mg Qd x 7 days.    Wearing pads - plan for interstim procedure May 22nd w Urology Dr. Cochran    PHQ-4 Score: 0     From last visit w me 12/29/23  Here today to f/u bronchitis - Continued fatigue no appetite but has been feeling a bit better since getting breathing treatment here and tylenol helping w sinus headache - No recent BM but staying hydrated - voice hoarse but no sore throat  PHQ-4 Score: 0     Recent appointments:   4/22/24 Infusion Heme/Onc epo  4/18/24 Breast Surg K[facility]  R breast complex cyst no change  4/16/24 Urology Rushing OAB  4/08/24  Infusion Heme/Onc epo  4/08/24 Heme/Onc Burk  3/18/24  Infusion Heme/Onc epo  3/14/24 O65+ Randolph dysuria    Cancelled EAWV w NP Randolph 4/10/24    Upcoming appointments:   Date Provider Specialty Appt Notes    5/6/2024 Chelsea Collado NP Primary Care AWV    5/13/2024 Elis Wick MD Primary Care 2 month f/u--Attempted:LVM 4/9 KJ    5/20/2024  Lab monthly cbc    5/20/2024  Chemotherapy retacrit monthy with cbc/ow/ngwv  (f/u with lab in aug Kelsie)/ngwv    5/22/2024 Sami Cochran MD Urology Interstim    5/28/2024 Carter Crawford MD Nephrology f/u    7/3/2024  Lab     7/3/2024  Radiology     7/11/2024 Prasanth Johnson MD Rheumatology stephanie/bc    10/17/2024 Kristine Delgado NP Breast Surgery exam only     The following were reviewed: Active problem list, medication list, allergies, family history, social history, and Health Maintenance.     Medications have been reviewed and reconciled with patient at visit today.    Review of Systems   See HPI above    Exam: sat 98%  Vitals:    05/01/24 1504   BP: 136/80   Pulse: 60   Temp: 98.5 °F (36.9  °C)     Weight: 71 kg (156 lb 8.4 oz)   Body mass index is 28.63 kg/m².    BP Readings from Last 3 Encounters:   05/01/24 136/80   05/01/24 129/86   04/22/24 139/80      Wt Readings from Last 3 Encounters:   05/01/24 1504 71 kg (156 lb 8.4 oz)   05/01/24 1318 72.7 kg (160 lb 4.4 oz)   04/18/24 1358 72.7 kg (160 lb 4.4 oz)     Physical Exam  Vitals reviewed.   Constitutional:       General: She is not in acute distress.     Appearance: Normal appearance.   HENT:      Head: Normocephalic and atraumatic.      Right Ear: External ear normal.      Left Ear: External ear normal.      Nose: Nose normal.      Mouth/Throat:      Mouth: Mucous membranes are moist.      Pharynx: Oropharynx is clear.   Eyes:      Extraocular Movements: Extraocular movements intact.      Conjunctiva/sclera: Conjunctivae normal.      Comments: glasses   Cardiovascular:      Rate and Rhythm: Normal rate.   Pulmonary:      Effort: Pulmonary effort is normal. No respiratory distress.   Musculoskeletal:      Right lower leg: No edema.      Left lower leg: No edema.   Skin:     General: Skin is warm and dry.   Neurological:      Mental Status: She is alert. Mental status is at baseline.      Comments: Ambulates independently w/o AD   Psychiatric:         Mood and Affect: Mood normal.         Behavior: Behavior normal.         Thought Content: Thought content normal.         Judgment: Judgment normal.        Laboratory Reviewed  Lab Results   Component Value Date    WBC 4.47 04/05/2024    HGB 9.0 (L) 04/05/2024    HCT 27.8 (L) 04/05/2024     04/05/2024    MCV 88 04/05/2024    CHOL 156 09/19/2023    TRIG 176 (H) 09/19/2023    HDL 34 (L) 09/19/2023    LDLCALC 86.8 09/19/2023    ALT 21 04/05/2024    AST 31 04/05/2024     04/05/2024    K 4.6 04/05/2024     (H) 04/05/2024    CREATININE 3.0 (H) 04/05/2024    BUN 45 (H) 04/05/2024    CO2 19 (L) 04/05/2024    MG 1.8 06/20/2023    TSH 7.542 (H) 11/14/2023    FREET4 0.83 11/14/2023    PSA <0.1  05/27/2008    INR 1.0 11/22/2021    HGBA1C 5.1 03/08/2023    CRP 14.4 (H) 08/24/2023     Lab Results   Component Value Date    .9 (H) 10/11/2023    CALCIUM 9.1 04/05/2024    CAION 1.13 09/05/2018    PHOS 4.4 03/18/2024      Lab Results   Component Value Date    CGBCUPYG19 1373 (H) 06/20/2023     Lab Results   Component Value Date    FOLATE 20.0 06/20/2023      Lab Results   Component Value Date    IRON 67 04/05/2024    TRANSFERRIN 187 (L) 04/05/2024    TIBC 277 04/05/2024    FESATURATED 24 04/05/2024      Lab Results   Component Value Date    EGFRNORACEVR 16 (A) 04/05/2024    ALBUMIN 3.3 (L) 04/05/2024     (H) 12/27/2023     Lab Results   Component Value Date    GTHAVAAE60BB 36 07/06/2023      Assessment:   68 y.o. female with multiple co-morbid illnesses here for continued follow up of medical problems.      The primary encounter diagnosis was Vitamin D deficiency. Diagnoses of Heart transplanted, Other osteoporosis without current pathological fracture, Urge incontinence of urine, Primary insomnia, Essential hypertension, and Borderline abnormal TFTs were also pertinent to this visit.      Plan:   1. Vitamin D deficiency  -     Vitamin D; Future; Expected date: 05/01/2024    2. Heart transplanted  Overview:  5/93     Orders:  -     cycloSPORINE modified, NEORAL, (NEORAL) 25 MG capsule; Take 3 capsules (75 mg total) by mouth 2 (two) times daily.  Dispense: 540 capsule; Refill: 1    3. Other osteoporosis without current pathological fracture  Overview:  FINDINGS: 2023  The L1 to L4 vertebral bone mineral density is equal to 1.06 g/cm squared with a T score of -1.0.  There has been no significant change relative to the prior study.     The left femoral neck bone mineral density is equal to 0.995 g/cm squared with a T score of -0.3.  There has been  no significant change relative to the prior study.     There is a 8.3% risk of a major osteoporotic fracture and a 0.5% risk of hip fracture in the next 10  years (FRAX).     Impression:     Osteopenia    Orders:  -     Discontinue: calcitonin, salmon, (FORTICAL) 200 unit/actuation nasal spray; 1 spray by Nasal route once daily.  Dispense: 3 each; Refill: 3    4. Urge incontinence of urine  -     POCT URINE DIPSTICK WITHOUT MICROSCOPE    5. Primary insomnia  -     zolpidem (AMBIEN) 5 MG Tab; Take 1 tablet (5 mg total) by mouth nightly as needed (insomnia).  Dispense: 90 tablet; Refill: 0    6. Essential hypertension  -     Creatinine, urine, random    7. Borderline abnormal TFTs  -     TSH; Future; Expected date: 05/01/2024  -     T4, Free; Future; Expected date: 05/01/2024    Other orders  -     carvediloL (COREG) 6.25 MG tablet; Take 1 tablet (6.25 mg total) by mouth 2 (two) times daily with meals.  Dispense: 180 tablet; Refill: 3  -     pregabalin (LYRICA) 50 MG capsule; Take 1 capsule (50 mg total) by mouth 2 (two) times daily. for 10 days  Dispense: 20 capsule; Refill: 0         Health Maintenance         Date Due Completion Date    Annual UACr Never done ---    RSV Vaccine (Age 60+ and Pregnant patients) (1 - 1-dose 60+ series) Never done ---    COVID-19 Vaccine (7 - 2023-24 season) 09/01/2023 4/27/2023    Mammogram 04/18/2025 4/18/2024    Lipid Panel 05/20/2025 5/20/2024    DEXA Scan 01/25/2026 1/25/2023    Colorectal Cancer Screening 02/25/2026 2/25/2021    Hemoglobin A1c (Diabetic Prevention Screening) 03/08/2026 3/8/2023    TETANUS VACCINE 09/09/2030 9/9/2020            -Patient's lab results were reviewed and discussed with patient  -Treatment options and alternatives were discussed with the patient. Patient expressed understanding. Patient was given the opportunity to ask questions and be an active participant in their medical care. Patient had no further questions or concerns at this time.     Follow up: Follow up in about 4 weeks (around 5/29/2024) for Follow Up w me (if nothing avail please keep 5/13 appt) .    After visit summary printed and given to  patient upon discharge.  Patient care plan included in After visit summary.    TOTAL TIME evaluating and managing this patient for this encounter was greater than 55 minutes. This time was spent personally by me on some of the following activities: review of patient's past medical history, assessing age-appropriate health maintenance needs, review of any interval history, review and interpretation of lab results, review and interpretation of imaging test results, review and interpretation of cardiology test results, reviewing consulting specialist notes, obtaining history from the patient and family, examination of the patient, medication reconciliation, managing and/or ordering prescription medications, ordering imaging tests, ordering referral to subspecialty provider(s), educating patient and answering their questions about diagnosis, treatment plan, and goals of treatment, discussing planned follow-up and final documentation of the visit. This time was exclusive of any separately billable procedures for this patient and exclusive of time spent treating any other patients.

## 2024-05-02 ENCOUNTER — TELEPHONE (OUTPATIENT)
Dept: PRIMARY CARE CLINIC | Facility: CLINIC | Age: 69
End: 2024-05-02
Payer: MEDICARE

## 2024-05-02 DIAGNOSIS — Z86.2 HISTORY OF ANEMIA DUE TO VITAMIN B12 DEFICIENCY: ICD-10-CM

## 2024-05-02 DIAGNOSIS — R94.6 BORDERLINE ABNORMAL TFTS: ICD-10-CM

## 2024-05-02 DIAGNOSIS — E03.9 ACQUIRED HYPOTHYROIDISM: Primary | ICD-10-CM

## 2024-05-02 NOTE — ASSESSMENT & PLAN NOTE
Assess and monitor  Lab Results   Component Value Date    CREATININE 3.0 (H) 04/05/2024    CREATININE 3.3 (H) 03/18/2024    CREATININE 2.2 (H) 12/27/2023     Lab Results   Component Value Date    EGFRNONAA 17 (A) 07/14/2022    EGFRNONAA 18 (A) 06/14/2022    EGFRNONAA 20 (A) 05/13/2022   Avoid nephrotoxic agents  Denies any prerenal GI losses, postrenal complaints  F/U with PCP or Nephrology

## 2024-05-02 NOTE — PROGRESS NOTES
Pt does not use [facility] pt portal - please call w message below:    TSH still elevated but with normal free T4 level I wouldn't recommend adding medication for this. Would like to recheck with a T3 level in about 8 weeks.   Vitamin D and urine creatinine are good. There is a lot of protein in your urine though.  Pleas continue close f/u with your nephrologist and transplant team!

## 2024-05-02 NOTE — PROGRESS NOTES
"  Nadia Damon presented for an initial Medicare AWV today. The following components were reviewed and updated:    Medical history  Family History  Social history  Allergies and Current Medications  Health Risk Assessment  Health Maintenance  Care Team    **See Completed Assessments for Annual Wellness visit with in the encounter summary    The following assessments were completed:  Depression Screening  Cognitive function Screening  Timed Get Up Test  Whisper Test  Nutrition Screening    Vitals:    05/06/24 0920   BP: 138/78   BP Location: Right arm   Patient Position: Sitting   Pulse: 90   Temp: 97.8 °F (36.6 °C)   TempSrc: Oral   SpO2: 95%   Weight: 72.6 kg (159 lb 15.1 oz)   Height: 5' 2" (1.575 m)     Body mass index is 29.25 kg/m².            Review of Systems   Constitutional:  Negative for activity change, appetite change, chills, fatigue and fever.   HENT:  Negative for congestion, ear pain, hearing loss, postnasal drip, rhinorrhea, sore throat and trouble swallowing.    Eyes:  Negative for visual disturbance.   Respiratory:  Negative for cough, shortness of breath and wheezing.    Cardiovascular:  Positive for leg swelling. Negative for chest pain and palpitations.   Gastrointestinal:  Positive for constipation and nausea. Negative for abdominal pain, blood in stool, diarrhea and vomiting.   Genitourinary:  Negative for difficulty urinating, dysuria, frequency and hematuria.        Incontinence   Musculoskeletal:  Positive for arthralgias and back pain. Negative for myalgias and neck pain.   Skin:  Negative for rash and wound.   Neurological:  Negative for dizziness, speech difficulty, weakness, light-headedness, numbness and headaches.   Psychiatric/Behavioral:  Positive for sleep disturbance. Negative for confusion and dysphoric mood. The patient is nervous/anxious.            Physical Exam  Vitals reviewed.   Constitutional:       General: She is not in acute distress.     Appearance: Normal " appearance.   HENT:      Head: Normocephalic and atraumatic.      Nose: Nose normal.      Mouth/Throat:      Mouth: Mucous membranes are moist.   Eyes:      General: No scleral icterus.     Conjunctiva/sclera: Conjunctivae normal.   Cardiovascular:      Rate and Rhythm: Normal rate and regular rhythm.      Heart sounds: No murmur heard.  Pulmonary:      Effort: Pulmonary effort is normal. No respiratory distress.      Breath sounds: Normal breath sounds.   Abdominal:      Palpations: Abdomen is soft. There is no mass.      Tenderness: There is no abdominal tenderness.   Musculoskeletal:      Cervical back: Normal range of motion and neck supple.      Right lower leg: No edema.      Left lower leg: No edema.   Lymphadenopathy:      Cervical: No cervical adenopathy.   Skin:     General: Skin is warm and dry.   Neurological:      Mental Status: She is alert and oriented to person, place, and time. Mental status is at baseline.   Psychiatric:         Mood and Affect: Mood normal.         Thought Content: Thought content normal.            Health Maintenance         Date Due Completion Date    Annual UACr Never done ---    RSV Vaccine (Age 60+ and Pregnant patients) (1 - 1-dose 60+ series) Never done ---    COVID-19 Vaccine (7 - 2023-24 season) 09/01/2023 4/27/2023    Lipid Panel 09/19/2024 9/19/2023    Mammogram 04/18/2025 4/18/2024    DEXA Scan 01/25/2026 1/25/2023    Colorectal Cancer Screening 02/25/2026 2/25/2021    Hemoglobin A1c (Diabetic Prevention Screening) 03/08/2026 3/8/2023    TETANUS VACCINE 09/09/2030 9/9/2020              PROBLEM LIST ITEMS ADDRESSED THIS VISIT:    1. Encounter for preventive health examination  Assessment & Plan:  Lab work and DEXA scan due to 2024    Review for Opioid Screening: Pt does not have Rx for Opioids      Review for Substance Use Disorders: Patient does not use illicit substances as reported        2. Other hyperlipidemia  Assessment & Plan:     Lab Results   Component Value  Date    CHOL 156 09/19/2023    HDL 34 (L) 09/19/2023    LDLCALC 86.8 09/19/2023    TRIG 176 (H) 09/19/2023     Chronic, stable  Include activity and weight loss in healthcare plan  F/U PCP       3. Heart transplanted  Overview:  5/93     Assessment & Plan:  Chronic, stable  Continue antirejection medication  F/U with PcP and transplant team      4. Essential hypertension  Assessment & Plan:  Controlled this visit  Continue nifedipine, hydralazine and carvedilol  Continue daily activity, low sodium diet, weight loss efforts       5. Chronic diastolic (congestive) heart failure  Overview:  Results for orders placed during the hospital encounter of 08/25/23    Echo    Interpretation Summary    Left Ventricle: The left ventricle is normal in size. Normal wall thickness. There is mild concentric hypertrophy. Normal wall motion. There is normal systolic function with a visually estimated ejection fraction of 55 - 70%. Ejection fraction by visual approximation is 65%. There is normal diastolic function.    Left Atrium: Left atrium is severely dilated.    Right Ventricle: Mild right ventricular enlargement. Wall thickness is normal. Right ventricle wall motion  is normal. Systolic function is borderline low.    Tricuspid Valve: There is mild to moderate regurgitation. There is mild pulmonary hypertension.    IVC/SVC: Normal venous pressure at 3 mmHg.     Assessment & Plan:  Chronic, stable  Currently controlled  Continue furosemide  Control BP  F/U with PCP      6. Aortic atherosclerosis  Overview:  CXR  8/8/2016---Aortic atherosclerosis.    Assessment & Plan:  Chronic, stable  Monitor serum lipids and control BP  F/U with PCP      7. CKD (chronic kidney disease) stage 4, GFR 15-29 ml/min  Overview:  US Retro   5/16/2018---Mild cortical thinning and increased cortical echogenicity.  Findings can be seen with medical renal disease.  8/31/216--- Echogenic kidneys with diffuse cortical thinning suggesting  medical renal  disease. 2 complex right renal cortical cysts.    Assessment & Plan:  Assess and monitor  Lab Results   Component Value Date    CREATININE 3.0 (H) 04/05/2024    CREATININE 3.3 (H) 03/18/2024    CREATININE 2.2 (H) 12/27/2023     Lab Results   Component Value Date    EGFRNONAA 17 (A) 07/14/2022    EGFRNONAA 18 (A) 06/14/2022    EGFRNONAA 20 (A) 05/13/2022   Avoid nephrotoxic agents  Denies any prerenal GI losses, postrenal complaints  F/U with PCP or Nephrology       8. Immunocompromised patient  Assessment & Plan:  Monitor for S/S of infection  Continue cyclosporine  F/U with Transplant Team and PcP      9. Secondary and unspecified malignant neoplasm of axilla and upper limb lymph nodes  Overview:  TREATMENT HISTORY:   1. L lumpectomy 9/10/21  2. L sentinel node biopsy 10/12/21  3. Attempted chemotherapy  4. 42.56Gy/16fx to L breast and axilla completed 4/14/22    Assessment & Plan:  Assess and monitor  Continue surveillance   F/U with Oncology      10. Secondary hyperparathyroidism, renal  Assessment & Plan:  Lab Results   Component Value Date    .9 (H) 10/11/2023    CALCIUM 9.1 04/05/2024    CAION 1.13 09/05/2018    PHOS 4.4 03/18/2024    Chronic, stable  F/U with PCP      11. Other osteoporosis without current pathological fracture  Overview:  FINDINGS: 2023  The L1 to L4 vertebral bone mineral density is equal to 1.06 g/cm squared with a T score of -1.0.  There has been no significant change relative to the prior study.     The left femoral neck bone mineral density is equal to 0.995 g/cm squared with a T score of -0.3.  There has been  no significant change relative to the prior study.     There is a 8.3% risk of a major osteoporotic fracture and a 0.5% risk of hip fracture in the next 10 years (FRAX).     Impression:     Osteopenia    Assessment & Plan:  Assess and monitor  Continue Vitamin D supplement  Calcitonin, salmon nasal spray  Encouraged weight bearing exercise  F/U with PCP and  Rheumatology      12. Acquired hypothyroidism  Assessment & Plan:  Lab Results   Component Value Date    TSH 8.112 (H) 05/01/2024    Assess and monitor  F/U with PCP      13. Anemia associated with stage 4 chronic renal failure  Assessment & Plan:  Lab Results   Component Value Date    WBC 4.47 04/05/2024    HGB 9.0 (L) 04/05/2024    HCT 27.8 (L) 04/05/2024    MCV 88 04/05/2024     04/05/2024        Lab Results   Component Value Date    IRON 67 04/05/2024    TRANSFERRIN 187 (L) 04/05/2024    TIBC 277 04/05/2024    FESATURATED 24 04/05/2024     Continue Retacrit injections  F/U with Nephrology and Hematology      14. Encounter for Medicare annual wellness exam  -     Ambulatory Referral/Consult to Enhanced Annual Wellness Visit (eAWV)    15. Other pancytopenia  Assessment & Plan:  Lab Results   Component Value Date    WBC 4.47 04/05/2024    HGB 9.0 (L) 04/05/2024    HCT 27.8 (L) 04/05/2024    MCV 88 04/05/2024     04/05/2024      F/U with Hematology      16. Orthopedic device, implant, or graft complication  Assessment & Plan:  Defibrillator lead wires still present  Defibrillator was removed  F/U with Cardiology              Provided Nadia with a 5-10 year written screening schedule and personal prevention plan. Recommendations were developed using the USPSTF age appropriate recommendations. Education, counseling, and referrals were provided as needed.  After Visit Summary printed and given to patient which includes a list of additional screenings\tests needed.    Future Appointments   Date Time Provider Department Center   5/13/2024  3:40 PM Elis Wick MD BS [facility]   5/20/2024  1:00 PM BATON ROUDREAD CC LAB [facility] LAB DS [facility]   5/20/2024  2:00 PM INJECTION 1, [facility] INFUSION [facility] INFSN [facility]   5/22/2024  3:00 PM Sami Cochran MD [facility] UROLOGY  [facility]   5/28/2024  2:30 PM Carter Crawford MD ON NEPHRO  Medical C   7/3/2024  1:10 PM LABORATORY, [facility] LAB [place]    7/3/2024  1:30 PM ONLC BMD1 ONLH DEXABMD  Medical C   7/11/2024 12:30 PM Prasanth Johnson MD ONLC RHEU  Medical C   10/17/2024  1:00 PM Kristine Delgado, NP [facility]          Chelsea Collado APRN, NP-C  [facility] [address] [place] [address]    I offered to discuss advanced care planning, including how to pick a person who would make decisions for you if you were unable to make them for yourself, called a health care power of , and what kind of decisions you might make such as use of life sustaining treatments such as ventilators and tube feeding when faced with a life limiting illness recorded on a living will that they will need to know. (How you want to be cared for as you near the end of your natural life)     X  Patient has advanced directives on file, which we reviewed, and they do not wish to make changes.

## 2024-05-02 NOTE — ASSESSMENT & PLAN NOTE
Assess and monitor  Continue Vitamin D supplement  Calcitonin, salmon nasal spray  Encouraged weight bearing exercise  F/U with PCP and Rheumatology

## 2024-05-02 NOTE — ASSESSMENT & PLAN NOTE
Lab Results   Component Value Date    CHOL 156 09/19/2023    HDL 34 (L) 09/19/2023    LDLCALC 86.8 09/19/2023    TRIG 176 (H) 09/19/2023     Chronic, stable  Include activity and weight loss in healthcare plan  F/U PCP

## 2024-05-02 NOTE — ASSESSMENT & PLAN NOTE
Lab Results   Component Value Date    WBC 4.47 04/05/2024    HGB 9.0 (L) 04/05/2024    HCT 27.8 (L) 04/05/2024    MCV 88 04/05/2024     04/05/2024        Lab Results   Component Value Date    IRON 67 04/05/2024    TRANSFERRIN 187 (L) 04/05/2024    TIBC 277 04/05/2024    FESATURATED 24 04/05/2024     Continue Retacrit injections  F/U with Nephrology and Hematology

## 2024-05-02 NOTE — ASSESSMENT & PLAN NOTE
Controlled this visit  Continue nifedipine, hydralazine and carvedilol  Continue daily activity, low sodium diet, weight loss efforts

## 2024-05-02 NOTE — ASSESSMENT & PLAN NOTE
Lab Results   Component Value Date    TSH 8.112 (H) 05/01/2024    Assess and monitor  F/U with PCP

## 2024-05-02 NOTE — ASSESSMENT & PLAN NOTE
Lab Results   Component Value Date    .9 (H) 10/11/2023    CALCIUM 9.1 04/05/2024    CAION 1.13 09/05/2018    PHOS 4.4 03/18/2024    Chronic, stable  F/U with PCP

## 2024-05-02 NOTE — TELEPHONE ENCOUNTER
----- Message from Elis Wick MD sent at 5/2/2024  7:10 AM CDT -----  Pt does not use [facility] pt portal - please call w message below:    TSH still elevated but with normal free T4 level I wouldn't recommend adding medication for this. Would like to recheck with a T3 level in about 8 weeks.   Vitamin D and urine creatinine are good. There is a lot of protein in your urine though.  Pleas continue close f/u with your nephrologist and transplant team!

## 2024-05-06 ENCOUNTER — OFFICE VISIT (OUTPATIENT)
Dept: PRIMARY CARE CLINIC | Facility: CLINIC | Age: 69
End: 2024-05-06
Payer: MEDICARE

## 2024-05-06 VITALS
DIASTOLIC BLOOD PRESSURE: 78 MMHG | SYSTOLIC BLOOD PRESSURE: 138 MMHG | HEIGHT: 62 IN | HEART RATE: 90 BPM | TEMPERATURE: 98 F | OXYGEN SATURATION: 95 % | WEIGHT: 159.94 LBS | BODY MASS INDEX: 29.43 KG/M2

## 2024-05-06 DIAGNOSIS — D84.9 IMMUNOCOMPROMISED PATIENT: ICD-10-CM

## 2024-05-06 DIAGNOSIS — N25.81 SECONDARY HYPERPARATHYROIDISM, RENAL: Chronic | ICD-10-CM

## 2024-05-06 DIAGNOSIS — E78.49 OTHER HYPERLIPIDEMIA: ICD-10-CM

## 2024-05-06 DIAGNOSIS — D63.1 ANEMIA ASSOCIATED WITH STAGE 4 CHRONIC RENAL FAILURE: Chronic | ICD-10-CM

## 2024-05-06 DIAGNOSIS — M81.8 OTHER OSTEOPOROSIS WITHOUT CURRENT PATHOLOGICAL FRACTURE: ICD-10-CM

## 2024-05-06 DIAGNOSIS — C77.3 SECONDARY AND UNSPECIFIED MALIGNANT NEOPLASM OF AXILLA AND UPPER LIMB LYMPH NODES: ICD-10-CM

## 2024-05-06 DIAGNOSIS — Z00.00 ENCOUNTER FOR MEDICARE ANNUAL WELLNESS EXAM: ICD-10-CM

## 2024-05-06 DIAGNOSIS — I50.32 CHRONIC DIASTOLIC (CONGESTIVE) HEART FAILURE: ICD-10-CM

## 2024-05-06 DIAGNOSIS — Z00.00 ENCOUNTER FOR PREVENTIVE HEALTH EXAMINATION: Primary | ICD-10-CM

## 2024-05-06 DIAGNOSIS — I70.0 AORTIC ATHEROSCLEROSIS: ICD-10-CM

## 2024-05-06 DIAGNOSIS — Z94.1 HEART TRANSPLANTED: Chronic | ICD-10-CM

## 2024-05-06 DIAGNOSIS — T84.9XXA ORTHOPEDIC DEVICE, IMPLANT, OR GRAFT COMPLICATION: ICD-10-CM

## 2024-05-06 DIAGNOSIS — D61.818 OTHER PANCYTOPENIA: ICD-10-CM

## 2024-05-06 DIAGNOSIS — N18.4 ANEMIA ASSOCIATED WITH STAGE 4 CHRONIC RENAL FAILURE: Chronic | ICD-10-CM

## 2024-05-06 DIAGNOSIS — I10 ESSENTIAL HYPERTENSION: Chronic | ICD-10-CM

## 2024-05-06 DIAGNOSIS — N18.4 CKD (CHRONIC KIDNEY DISEASE) STAGE 4, GFR 15-29 ML/MIN: Chronic | ICD-10-CM

## 2024-05-06 DIAGNOSIS — E03.9 ACQUIRED HYPOTHYROIDISM: ICD-10-CM

## 2024-05-06 PROCEDURE — 99999 PR PBB SHADOW E&M-EST. PATIENT-LVL III: CPT | Mod: PBBFAC,HCNC,, | Performed by: NURSE PRACTITIONER

## 2024-05-06 PROCEDURE — 1123F ACP DISCUSS/DSCN MKR DOCD: CPT | Mod: HCNC,CPTII,S$GLB, | Performed by: NURSE PRACTITIONER

## 2024-05-06 PROCEDURE — G0438 PPPS, INITIAL VISIT: HCPCS | Mod: HCNC,S$GLB,, | Performed by: NURSE PRACTITIONER

## 2024-05-06 PROCEDURE — 1101F PT FALLS ASSESS-DOCD LE1/YR: CPT | Mod: HCNC,CPTII,S$GLB, | Performed by: NURSE PRACTITIONER

## 2024-05-06 PROCEDURE — 1170F FXNL STATUS ASSESSED: CPT | Mod: HCNC,CPTII,S$GLB, | Performed by: NURSE PRACTITIONER

## 2024-05-06 PROCEDURE — G9919 SCRN ND POS ND PROV OF REC: HCPCS | Mod: HCNC,CPTII,S$GLB, | Performed by: NURSE PRACTITIONER

## 2024-05-06 PROCEDURE — 3075F SYST BP GE 130 - 139MM HG: CPT | Mod: HCNC,CPTII,S$GLB, | Performed by: NURSE PRACTITIONER

## 2024-05-06 PROCEDURE — 3078F DIAST BP <80 MM HG: CPT | Mod: HCNC,CPTII,S$GLB, | Performed by: NURSE PRACTITIONER

## 2024-05-06 PROCEDURE — 3288F FALL RISK ASSESSMENT DOCD: CPT | Mod: HCNC,CPTII,S$GLB, | Performed by: NURSE PRACTITIONER

## 2024-05-06 PROCEDURE — 1126F AMNT PAIN NOTED NONE PRSNT: CPT | Mod: HCNC,CPTII,S$GLB, | Performed by: NURSE PRACTITIONER

## 2024-05-06 PROCEDURE — 1159F MED LIST DOCD IN RCRD: CPT | Mod: HCNC,CPTII,S$GLB, | Performed by: NURSE PRACTITIONER

## 2024-05-06 NOTE — PATIENT INSTRUCTIONS
Counseling and Referral of Other Preventative  (Italic type indicates deductible and co-insurance are waived)    Patient Name: Nadia Damon  Today's Date: 5/6/2024    Health Maintenance       Date Due Completion Date    Annual UACr Never done ---    RSV Vaccine (Age 60+ and Pregnant patients) (1 - 1-dose 60+ series) Never done ---    COVID-19 Vaccine (7 - 2023-24 season) 09/01/2023 4/27/2023    Lipid Panel 09/19/2024 9/19/2023    Mammogram 04/18/2025 4/18/2024    DEXA Scan 01/25/2026 1/25/2023    Colorectal Cancer Screening 02/25/2026 2/25/2021    Hemoglobin A1c (Diabetic Prevention Screening) 03/08/2026 3/8/2023    TETANUS VACCINE 09/09/2030 9/9/2020        No orders of the defined types were placed in this encounter.    The following information is provided to all patients.  This information is to help you find resources for any of the problems found today that may be affecting your health:                  Living healthy guide: www.[facility].[place].[facility]      Understanding Diabetes: www.diabetes.org      Eating healthy: www.cdc.gov/healthyweight      CDC home safety checklist: www.cdc.gov/steadi/patient.html      Agency on Aging: www.goea.[place].gov      Alcoholics anonymous (AA): www.aa.org      Physical Activity: www.thea.nih.gov/ai0iuxy      Tobacco use: www.quitwithusla.org

## 2024-05-06 NOTE — ASSESSMENT & PLAN NOTE
Lab work and DEXA scan due to 2024    Review for Opioid Screening: Pt does not have Rx for Opioids      Review for Substance Use Disorders: Patient does not use illicit substances as reported

## 2024-05-06 NOTE — ASSESSMENT & PLAN NOTE
Lab Results   Component Value Date    WBC 4.47 04/05/2024    HGB 9.0 (L) 04/05/2024    HCT 27.8 (L) 04/05/2024    MCV 88 04/05/2024     04/05/2024      F/U with Hematology

## 2024-05-13 ENCOUNTER — OFFICE VISIT (OUTPATIENT)
Dept: PRIMARY CARE CLINIC | Facility: CLINIC | Age: 69
End: 2024-05-13
Payer: MEDICARE

## 2024-05-13 VITALS
DIASTOLIC BLOOD PRESSURE: 72 MMHG | SYSTOLIC BLOOD PRESSURE: 120 MMHG | HEIGHT: 62 IN | TEMPERATURE: 99 F | BODY MASS INDEX: 28.26 KG/M2 | OXYGEN SATURATION: 97 % | WEIGHT: 153.56 LBS | HEART RATE: 98 BPM

## 2024-05-13 DIAGNOSIS — R94.6 BORDERLINE ABNORMAL TFTS: Primary | Chronic | ICD-10-CM

## 2024-05-13 DIAGNOSIS — I10 ESSENTIAL HYPERTENSION: Chronic | ICD-10-CM

## 2024-05-13 DIAGNOSIS — I50.32 CHRONIC DIASTOLIC (CONGESTIVE) HEART FAILURE: Chronic | ICD-10-CM

## 2024-05-13 DIAGNOSIS — N39.41 URGE INCONTINENCE OF URINE: Primary | ICD-10-CM

## 2024-05-13 DIAGNOSIS — Z94.1 HEART TRANSPLANTED: Chronic | ICD-10-CM

## 2024-05-13 DIAGNOSIS — N39.3 SUI (STRESS URINARY INCONTINENCE, FEMALE): Chronic | ICD-10-CM

## 2024-05-13 DIAGNOSIS — I70.0 AORTIC ATHEROSCLEROSIS: Chronic | ICD-10-CM

## 2024-05-13 DIAGNOSIS — N18.4 CKD (CHRONIC KIDNEY DISEASE) STAGE 4, GFR 15-29 ML/MIN: Chronic | ICD-10-CM

## 2024-05-13 PROBLEM — N30.00 ACUTE CYSTITIS WITHOUT HEMATURIA: Status: RESOLVED | Noted: 2022-05-10 | Resolved: 2024-05-13

## 2024-05-13 PROBLEM — E66.811 OBESITY (BMI 30.0-34.9): Status: RESOLVED | Noted: 2019-07-22 | Resolved: 2024-05-13

## 2024-05-13 PROBLEM — Z00.00 ENCOUNTER FOR PREVENTIVE HEALTH EXAMINATION: Status: RESOLVED | Noted: 2024-05-06 | Resolved: 2024-05-13

## 2024-05-13 PROBLEM — Z00.00 ENCOUNTER FOR MEDICARE ANNUAL WELLNESS EXAM: Status: RESOLVED | Noted: 2024-05-06 | Resolved: 2024-05-13

## 2024-05-13 PROBLEM — J18.9 PNEUMONIA DUE TO INFECTIOUS ORGANISM: Status: RESOLVED | Noted: 2024-03-14 | Resolved: 2024-05-13

## 2024-05-13 PROBLEM — U07.1 COVID-19 IN IMMUNOCOMPROMISED PATIENT: Status: RESOLVED | Noted: 2024-02-26 | Resolved: 2024-05-13

## 2024-05-13 PROBLEM — D84.9 COVID-19 IN IMMUNOCOMPROMISED PATIENT: Status: RESOLVED | Noted: 2024-02-26 | Resolved: 2024-05-13

## 2024-05-13 PROBLEM — D61.818 OTHER PANCYTOPENIA: Status: RESOLVED | Noted: 2024-05-06 | Resolved: 2024-05-13

## 2024-05-13 PROBLEM — E66.9 OBESITY (BMI 30.0-34.9): Status: RESOLVED | Noted: 2019-07-22 | Resolved: 2024-05-13

## 2024-05-13 LAB — CREAT UR-MCNC: 62 MG/DL (ref 15–325)

## 2024-05-13 PROCEDURE — 3288F FALL RISK ASSESSMENT DOCD: CPT | Mod: HCNC,CPTII,S$GLB, | Performed by: INTERNAL MEDICINE

## 2024-05-13 PROCEDURE — 82570 ASSAY OF URINE CREATININE: CPT | Mod: HCNC | Performed by: INTERNAL MEDICINE

## 2024-05-13 PROCEDURE — 1159F MED LIST DOCD IN RCRD: CPT | Mod: HCNC,CPTII,S$GLB, | Performed by: INTERNAL MEDICINE

## 2024-05-13 PROCEDURE — 3008F BODY MASS INDEX DOCD: CPT | Mod: HCNC,CPTII,S$GLB, | Performed by: INTERNAL MEDICINE

## 2024-05-13 PROCEDURE — 3074F SYST BP LT 130 MM HG: CPT | Mod: HCNC,CPTII,S$GLB, | Performed by: INTERNAL MEDICINE

## 2024-05-13 PROCEDURE — 3078F DIAST BP <80 MM HG: CPT | Mod: HCNC,CPTII,S$GLB, | Performed by: INTERNAL MEDICINE

## 2024-05-13 PROCEDURE — 1101F PT FALLS ASSESS-DOCD LE1/YR: CPT | Mod: HCNC,CPTII,S$GLB, | Performed by: INTERNAL MEDICINE

## 2024-05-13 PROCEDURE — 99999 PR PBB SHADOW E&M-EST. PATIENT-LVL V: CPT | Mod: PBBFAC,HCNC,, | Performed by: INTERNAL MEDICINE

## 2024-05-13 PROCEDURE — 1157F ADVNC CARE PLAN IN RCRD: CPT | Mod: HCNC,CPTII,S$GLB, | Performed by: INTERNAL MEDICINE

## 2024-05-13 PROCEDURE — 99215 OFFICE O/P EST HI 40 MIN: CPT | Mod: HCNC,S$GLB,, | Performed by: INTERNAL MEDICINE

## 2024-05-13 RX ORDER — POLYETHYLENE GLYCOL 3350 17 G/17G
17 POWDER, FOR SOLUTION ORAL DAILY
COMMUNITY

## 2024-05-13 NOTE — PROGRESS NOTES
Nadia Damon  05/13/2024  [id number]    Elis Wick MD  Patient Care Team:  Elis Wick MD as PCP - General (Internal Medicine)  Miguel Soni Jr., MD as Consulting Physician (Vascular Surgery)  Ike King MD as Consulting Physician (Cardiology)  Courtney Tubbs MD as Consulting Physician (Cardiology)  Carter Crawford MD as Consulting Physician (Nephrology)  Paxton Vasques OD as Consulting Physician (Optometry)  PRANAY Villalobos MD as Obstetrician (Obstetrics)  Parker Mccarthy IV, MD (Urology)  Ike King MD as Consulting Physician (Cardiology)  Jose Roland MD as Consulting Physician (Dermatology)  Prasanth Johnson MD as Consulting Physician (Rheumatology)  Elis Wick MD as Physician (Internal Medicine)  Lexi Bianchi LCSW as  (Hematology and Oncology)  Candace Saleh LMSW as  (Hematology and Oncology)  Kelsie Burk PA-C as Physician Assistant (Hematology and Oncology)    Visit Type: Follow-up    Chief Complaint:  Chief Complaint   Patient presents with    Follow-up     Pt is here for a week follow up. Complaining of back pain     History of Present Illness: History of Present Illness: Ms. Damon is a 68 year old female here for scheduled f/u.     Ms. Damon w h/o heart transplant 1993, on anti-rejection medication. 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 She was treated with 1 cycle of systemic chemotherapy cytoxan and taxotere and udenyca that was discontinued due to toxicity. She has been followed by radiation oncology and completed last radiation treatment 5/14/22. She is still followed by Heme/Onc for Epo, initiated for anemia due to stage 4 CKD.      Other medical issues include depresssion/anxiety/insomnia, hypertension, h/o chronic diastolic CHF, mild Pulm HTN and osteoporois for which she is receiving Prolia.     Has been  trying to limit sodium and phosphorus - more kidney friendly food and drink  Cont urinary frequency - evaluation May 22nd w Urology Dr. Cochran for possible interstim procedure this summer  Meanwhile, Q2 hr voiding is helping    PHQ-4 Score: 0     From last visit w me 5/01/24  Recently treated for UTI  Urine culture 4/16/24 resulted 4/20/24 KLEBSIELLA AEROGENES   Sensitive to Bactrim - pt prescribed Bactim DS by NP Rushing  Given Ms. Damon CKD stage 4 (with calculated crcl of 8 mL/min)  recommend to avoid use of Bactrim.  Advised Ms. Damon to discontinue Bactrim.  New prescription 4/20/24 for ciprofloxacin 500 mg Qd x 7 days.  Wearing pads - plan for interstim procedure May 22nd w Urology Dr. Cochran  PHQ-4 Score: 0     Recent appointments:   5/06/24 [facility] EAWV mini-cog 4/5 5/01/24 [facility] [place]     Cancelled EAWV w NP Randolph 4/10/24    Upcoming appointments:  Future Appointments       Next 10 Appointments       Date Provider Specialty Appt Notes    5/20/2024  Lab linked with dr cerrato /  monthly cbc    5/20/2024  Chemotherapy DL//retacrit monthy with cbc/ow/ngwv  (f/u with lab in aug Kelsie)/ngwv    5/22/2024 Sami Cochran MD Urology Interstim  Coming for 2:45    5/28/2024 Carter Cerrato MD Nephrology f/u    7/2/2024  Lab FASTING labs, Cyclosporine level    7/2/2024  Radiology .    7/2/2024  Cardiology seeing Dr Tubbs    7/2/2024 Courtney Tubbs MD Transplant post heart    7/3/2024  Radiology     7/5/2024 Elis Wick MD Primary Care One month f/u              Displaying the next 10 appointments. This patient has additional appointments scheduled.           The following were reviewed: Active problem list, medication list, allergies, family history, social history, and Health Maintenance.     Medications have been reviewed and reconciled with patient at visit today.    Review of Systems   See HPI above    Exam: sat 97%  Vitals:    05/13/24 1532   BP: 120/72   Pulse: 98   Temp: 98.9 °F  (37.2 °C)     Weight: 69.7 kg (153 lb 8.8 oz)   Body mass index is 28.08 kg/m².    BP Readings from Last 3 Encounters:   05/13/24 120/72   05/06/24 138/78   05/01/24 136/80      Wt Readings from Last 3 Encounters:   05/13/24 1532 69.7 kg (153 lb 8.8 oz)   05/06/24 0920 72.6 kg (159 lb 15.1 oz)   05/01/24 1504 71 kg (156 lb 8.4 oz)      Physical Exam  Vitals reviewed.   Constitutional:       General: She is not in acute distress.     Appearance: Normal appearance.   HENT:      Head: Normocephalic and atraumatic.      Right Ear: External ear normal.      Left Ear: External ear normal.      Nose: Nose normal.      Mouth/Throat:      Mouth: Mucous membranes are moist.      Pharynx: Oropharynx is clear.   Eyes:      Extraocular Movements: Extraocular movements intact.      Conjunctiva/sclera: Conjunctivae normal.      Comments: glasses   Neck:      Vascular: No carotid bruit.   Cardiovascular:      Rate and Rhythm: Normal rate and regular rhythm.      Heart sounds: No murmur heard.  Pulmonary:      Effort: Pulmonary effort is normal. No respiratory distress.      Breath sounds: No wheezing or rhonchi.   Abdominal:      General: Bowel sounds are normal.      Palpations: Abdomen is soft.      Tenderness: There is no abdominal tenderness. There is no guarding.   Musculoskeletal:      Right lower leg: No edema.      Left lower leg: No edema.   Lymphadenopathy:      Cervical: No cervical adenopathy.   Skin:     General: Skin is warm and dry.   Neurological:      Mental Status: She is alert and oriented to person, place, and time. Mental status is at baseline.      Coordination: Coordination normal.   Psychiatric:         Mood and Affect: Mood normal.         Behavior: Behavior normal.         Thought Content: Thought content normal.        Laboratory Reviewed  Lab Results   Component Value Date    WBC 4.47 04/05/2024    HGB 9.0 (L) 04/05/2024    HCT 27.8 (L) 04/05/2024     04/05/2024    MCV 88 04/05/2024    CHOL 156  09/19/2023    TRIG 176 (H) 09/19/2023    HDL 34 (L) 09/19/2023    LDLCALC 86.8 09/19/2023    ALT 21 04/05/2024    AST 31 04/05/2024     04/05/2024    K 4.6 04/05/2024     (H) 04/05/2024    CREATININE 3.0 (H) 04/05/2024    BUN 45 (H) 04/05/2024    CO2 19 (L) 04/05/2024    MG 1.8 06/20/2023    TSH 8.112 (H) 05/01/2024    FREET4 0.97 05/01/2024    PSA <0.1 05/27/2008    INR 1.0 11/22/2021    HGBA1C 5.1 03/08/2023    CRP 14.4 (H) 08/24/2023     Lab Results   Component Value Date    .9 (H) 10/11/2023    CALCIUM 9.1 04/05/2024    CAION 1.13 09/05/2018    PHOS 4.4 03/18/2024      Lab Results   Component Value Date    WYLLLQMC60 1373 (H) 06/20/2023     Lab Results   Component Value Date    FOLATE 20.0 06/20/2023      Lab Results   Component Value Date    IRON 67 04/05/2024    TRANSFERRIN 187 (L) 04/05/2024    TIBC 277 04/05/2024    FESATURATED 24 04/05/2024      Lab Results   Component Value Date    EGFRNORACEVR 16 (A) 04/05/2024    ALBUMIN 3.3 (L) 04/05/2024     (H) 12/27/2023     Lab Results   Component Value Date    EANKTZNO25YV 45 05/01/2024          Assessment:   68 y.o. female with multiple co-morbid illnesses here for continued follow up of medical problems.      The primary encounter diagnosis was Borderline abnormal TFTs. Diagnoses of Essential hypertension, Heart transplanted, Chronic diastolic (congestive) heart failure, Aortic atherosclerosis, FRANKY (stress urinary incontinence, female), and CKD (chronic kidney disease) stage 4, GFR 15-29 ml/min were also pertinent to this visit.      Plan:   1. Borderline abnormal TFTs  Assessment & Plan:  No current rx - TSH increasing, free T4 wnl - Plan for repeat TFTs early July      2. Essential hypertension  Assessment & Plan:  BP at goal w current rx - encourage efforts to limit sodium, avoid processed foods    Orders:  -     Creatinine, urine, random    3. Heart transplanted  Overview:  5/93     Assessment & Plan:  S/p successful heart transplant  coming up on 31 years! Followed by NO Transplant team      4. Chronic diastolic (congestive) heart failure  Overview:  Results for orders placed during the hospital encounter of 08/25/23    Echo    Interpretation Summary    Left Ventricle: The left ventricle is normal in size. Normal wall thickness. There is mild concentric hypertrophy. Normal wall motion. There is normal systolic function with a visually estimated ejection fraction of 55 - 70%. Ejection fraction by visual approximation is 65%. There is normal diastolic function.    Left Atrium: Left atrium is severely dilated.    Right Ventricle: Mild right ventricular enlargement. Wall thickness is normal. Right ventricle wall motion  is normal. Systolic function is borderline low.    Tricuspid Valve: There is mild to moderate regurgitation. There is mild pulmonary hypertension.    IVC/SVC: Normal venous pressure at 3 mmHg.     Assessment & Plan:  Stable - compensated - Normal systolic and diastolic function noted most recent echo cardiogram though w severely dilated L atrium mild pulm HTN and R systolic function borderline low - followed by cardiology and transplant - plan for repeat echo in July 5. Aortic atherosclerosis  Overview:  CXR  8/8/2016---Aortic atherosclerosis.    Orders:  -     Lipid Panel; Future; Expected date: 05/13/2024    6. FRANKY (stress urinary incontinence, female)  Overview:  08/10/2023 robotic Viera urethropexy     Assessment & Plan:  May 22nd april w Urology Dr. Cochran for possible interstim procedure this summer  Meanwhile, Q2 hr voiding is helping        7. CKD (chronic kidney disease) stage 4, GFR 15-29 ml/min  Overview:  US Retro   5/16/2018---Mild cortical thinning and increased cortical echogenicity.  Findings can be seen with medical renal disease.  8/31/216--- Echogenic kidneys with diffuse cortical thinning suggesting  medical renal disease. 2 complex right renal cortical cysts.    Assessment & Plan:  Pt eager to review steps  she has taken to limit sodium, phosphorus - goal to improve renal function if possible - does not want to have to consider HD           Health Maintenance         Date Due Completion Date    Annual UACr Never done ---    RSV Vaccine (Age 60+ and Pregnant patients) (1 - 1-dose 60+ series) Never done ---    COVID-19 Vaccine (7 - 2023-24 season) 09/01/2023 4/27/2023    Lipid Panel 09/19/2024 9/19/2023    Mammogram 04/18/2025 4/18/2024    DEXA Scan 01/25/2026 1/25/2023    Colorectal Cancer Screening 02/25/2026 2/25/2021    Hemoglobin A1c (Diabetic Prevention Screening) 03/08/2026 3/8/2023    TETANUS VACCINE 09/09/2030 9/9/2020          -Patient's lab results were reviewed and discussed with patient  -Treatment options and alternatives were discussed with the patient. Patient expressed understanding. Patient was given the opportunity to ask questions and be an active participant in their medical care. Patient had no further questions or concerns at this time.   -Patient is an overall moderate to HIGH risk for health complications from their medical conditions.     Follow up: Follow up in about 4 weeks (around 6/10/2024) for Follow Up w me or w Tobias if I'm not avail then w me in 8 weeks.    After visit summary printed and given to patient upon discharge.  Patient care plan included in After visit summary.    TOTAL TIME evaluating and managing this patient for this encounter was greater than 60 minutes. This time was spent personally by me on some of the following activities: review of patient's past medical history, assessing age-appropriate health maintenance needs, review of any interval history, review and interpretation of lab results, review and interpretation of imaging test results, review and interpretation of cardiology test results, reviewing consulting specialist notes, obtaining history from the patient and family, examination of the patient, medication reconciliation, managing and/or ordering prescription  medications, ordering imaging tests, ordering referral to subspecialty provider(s), educating patient and answering their questions about diagnosis, treatment plan, and goals of treatment, discussing planned follow-up and final documentation of the visit. This time was exclusive of any separately billable procedures for this patient and exclusive of time spent treating any other patients.

## 2024-05-13 NOTE — PATIENT INSTRUCTIONS
If you are feeling unwell, we'd like to be the first ones to know here at [facility]! Please give us a call. Same day appointments are our top priority to keep you well and out of the emergency rooms and hospitals. Call [phone] for our direct line. After hours advice is always available. Please call [phone] after hours to speak to the on-call team.      Recommend RSV vaccine that can be scheduled at your pharmacy of choice.    Consider repeat CoVid vaccine sometime after late June.    Let us know if continue w elevated heart rate   Consider f/u w your cardiologist Dr. Lopez also    Please FAST for labwork scheduled for 5/20/24  HOLD biotin 5 days prior to labwork scheduled for 7/03/24

## 2024-05-15 ENCOUNTER — TELEPHONE (OUTPATIENT)
Dept: TRANSPLANT | Facility: CLINIC | Age: 69
End: 2024-05-15
Payer: MEDICARE

## 2024-05-15 DIAGNOSIS — T86.20 COMPLICATION OF HEART TRANSPLANT, UNSPECIFIED COMPLICATION: Primary | ICD-10-CM

## 2024-05-15 DIAGNOSIS — R06.02 SHORTNESS OF BREATH: ICD-10-CM

## 2024-05-15 DIAGNOSIS — Z79.899 ENCOUNTER FOR LONG-TERM (CURRENT) USE OF MEDICATIONS: ICD-10-CM

## 2024-05-15 DIAGNOSIS — Z94.1 STATUS POST HEART TRANSPLANT: ICD-10-CM

## 2024-05-15 NOTE — PROGRESS NOTES
Pt has [facility] - if message below not viewed please call w information below:   Urine creatinine level is normal    Please message or call with any questions or concerns!  Thank you!  Elis Wick MD, MPH  [facility]

## 2024-05-15 NOTE — TELEPHONE ENCOUNTER
----- Message from Carolina Elder sent at 5/15/2024 10:02 AM CDT -----  Regarding: For 7/2/24 at [facility] echo  Pt is already scheduled for labs and clinic visit, please see about adding an echo and chest x ray [facility]      Thank you,   John

## 2024-05-15 NOTE — TELEPHONE ENCOUNTER
Spoke with pt regarding her heart rate in the 90's is normal for her. Reviewed de-nervation of the heart even after 30 + years of transplant. Pt states that she has started keeping a log of heart rate and BP, Last night at rest heart rate 81.    Will inform if she sees any changes in heart rate.    Spoke about her annual visit off schedule from may, last year was seen in August.    [facility] scheduled [facility] Failure and Cardiac transplant clinic on 7/2/24 with Dr. Tubbs.

## 2024-05-15 NOTE — TELEPHONE ENCOUNTER
Ms. Damon left a voice mail. Will try and schedule her at the [facility] as asap  She states that she has an upcoming procedure amd was told that her heart rate was too fast to see her Cardiologist.

## 2024-05-17 ENCOUNTER — TELEPHONE (OUTPATIENT)
Dept: UROLOGY | Facility: CLINIC | Age: 69
End: 2024-05-17
Payer: MEDICARE

## 2024-05-17 ENCOUNTER — TELEPHONE (OUTPATIENT)
Dept: PRIMARY CARE CLINIC | Facility: CLINIC | Age: 69
End: 2024-05-17
Payer: MEDICARE

## 2024-05-19 PROBLEM — N39.3 SUI (STRESS URINARY INCONTINENCE, FEMALE): Chronic | Status: ACTIVE | Noted: 2023-05-25

## 2024-05-19 NOTE — ASSESSMENT & PLAN NOTE
"-- DO NOT REPLY / DO NOT REPLY ALL --  -- Message is from the [facility]--    COVID-19 [place] Screening: Negative    General Patient Message      Reason for Call: Please follow up with patient's mother Ledy Levi to assist with setting up her son's annual check up appointment. Attempted to schedule appointment with any provider available, no more slots available. Caller Information       Type Contact Phone    05/21/2020 02:35 PM Phone (Incoming) Dorene Gosselin (Mother) [phone]          Alternative phone number: None     Turnaround time given to caller: ""This message will be sent to [facility] Provider's name]. The clinical team will fulfill your request as soon as they review your message. \""    " No current rx - TSH increasing, free T4 wnl - Plan for repeat TFTs early July

## 2024-05-19 NOTE — ASSESSMENT & PLAN NOTE
Stable - compensated - Normal systolic and diastolic function noted most recent echo cardiogram though w severely dilated L atrium mild pulm HTN and R systolic function borderline low - followed by cardiology and transplant - plan for repeat echo in July

## 2024-05-19 NOTE — ASSESSMENT & PLAN NOTE
May 22nd april silva Urology Dr. Cochran for possible interstim procedure this summer  Meanwhile, Q2 hr voiding is helping

## 2024-05-19 NOTE — ASSESSMENT & PLAN NOTE
Pt eager to review steps she has taken to limit sodium, phosphorus - goal to improve renal function if possible - does not want to have to consider HD

## 2024-05-20 ENCOUNTER — INFUSION (OUTPATIENT)
Dept: INFUSION THERAPY | Facility: HOSPITAL | Age: 69
End: 2024-05-20
Payer: MEDICARE

## 2024-05-20 VITALS
RESPIRATION RATE: 18 BRPM | SYSTOLIC BLOOD PRESSURE: 135 MMHG | OXYGEN SATURATION: 100 % | DIASTOLIC BLOOD PRESSURE: 73 MMHG | HEART RATE: 90 BPM | TEMPERATURE: 98 F

## 2024-05-20 DIAGNOSIS — N18.4 ANEMIA ASSOCIATED WITH STAGE 4 CHRONIC RENAL FAILURE: ICD-10-CM

## 2024-05-20 DIAGNOSIS — M81.8 OTHER OSTEOPOROSIS WITHOUT CURRENT PATHOLOGICAL FRACTURE: Primary | ICD-10-CM

## 2024-05-20 DIAGNOSIS — D63.1 ANEMIA ASSOCIATED WITH STAGE 4 CHRONIC RENAL FAILURE: ICD-10-CM

## 2024-05-20 PROCEDURE — 96372 THER/PROPH/DIAG INJ SC/IM: CPT | Mod: HCNC

## 2024-05-20 PROCEDURE — 63600175 PHARM REV CODE 636 W HCPCS: Mod: JZ,EC,JG,HCNC

## 2024-05-20 RX ADMIN — EPOETIN ALFA-EPBX 20000 UNITS: 10000 INJECTION, SOLUTION INTRAVENOUS; SUBCUTANEOUS at 02:05

## 2024-05-20 NOTE — DISCHARGE INSTRUCTIONS
[facility]  [address] [facility]  [address]  [phone] phone     [phone] fax  Hours of Operation: Monday- Friday 8:00am- 5:00pm  After hours phone  [phone]  Hematology / Oncology Physicians on call      SALENA Thurman Dr., NP Phaon Dunbar, NP Khelsea Conley, FNP    Please call with any concerns regarding your appointment today.

## 2024-05-22 ENCOUNTER — PROCEDURE VISIT (OUTPATIENT)
Dept: UROLOGY | Facility: CLINIC | Age: 69
End: 2024-05-22
Payer: MEDICARE

## 2024-05-22 DIAGNOSIS — N39.3 SUI (STRESS URINARY INCONTINENCE, FEMALE): Primary | ICD-10-CM

## 2024-05-22 DIAGNOSIS — N39.41 URGE INCONTINENCE OF URINE: ICD-10-CM

## 2024-05-22 DIAGNOSIS — N30.00 ACUTE CYSTITIS WITHOUT HEMATURIA: ICD-10-CM

## 2024-05-22 DIAGNOSIS — N99.3 PROLAPSE OF VAGINAL CUFF AFTER HYSTERECTOMY: ICD-10-CM

## 2024-05-22 PROCEDURE — 64561 IMPLANT NEUROELECTRODES: CPT | Mod: HCNC,50,S$GLB, | Performed by: UROLOGY

## 2024-05-22 NOTE — PROCEDURES
Procedures  Chief Complaint:   Encounter Diagnoses   Name Primary?    Urge incontinence of urine     FRANKY (stress urinary incontinence, female) Yes    Acute cystitis without hematuria     Prolapse of vaginal cuff after hysterectomy          HPI:   5/22/24- patient is here today for a percutaneous InterStim trial.    5/1/24- patient is here today for the 1st time to see me from [place], she is here today to discuss surgical management for urge incontinence as she has failed medical management.  No specific evidence of stress incontinence or UTI.    11/29/23- LR- Patient is a 68-year-old female that is presenting as a follow-up to overactive bladder.  Patient has urge incontinence and is wearing a pad that she changes several times a day.  She was prescribed Myrbetriq, however, was hospitalized secondary to an increase in potassium.  Patient reports that primary care provider discontinued Myrbetriq secondary to possible interaction related to increase potassium level.  Patient states that medication decreasing her urge incontinence and daytime frequency.  Is requesting a new medication or treatment option.       Allergies:  Lisinopril, Augmentin [amoxicillin-pot clavulanate], and Zyvox [linezolid]    Medications:  has a current medication list which includes the following prescription(s): albuterol, benzonatate, biotin, calcitonin (salmon), carvedilol, cyclosporine modified (neoral), diazepam, ergocalciferol, evening primrose oil, fluticasone propionate, furosemide, hydralazine, hydrocortisone, lidocaine, multivitamin, nifedipine, pantoprazole, patiromer calcium sorbitex, polyethylene glycol, pregabalin, psyllium husk, retacrit, temazepam, UNABLE TO FIND, vitamin e, and zolpidem, and the following Facility-Administered Medications: acetaminophen and famotidine.    Review of Systems:  General: No fever, chills, fatigability, or weight loss.  Skin: No rashes, itching, or changes in color or texture of skin.  Chest: Denies  DONOHUE, cyanosis, wheezing, cough, and sputum production.  Abdomen: Appetite fine. No weight loss. Denies diarrhea, abdominal pain, hematemesis, or blood in stool.  Musculoskeletal: No joint stiffness or swelling. Denies back pain.  : As above.  All other review of systems negative.    PMH:   has a past medical history of Abdominal wall hernia, Abnormal mammogram (10/12/2021), GRACE (acute kidney injury) (11/22/2021), Anxiety, Arthritis, Breast cancer in female (08/2021), Bronchitis (08/18/2016), C. difficile colitis (11/29/2021), Cellulitis of axilla, left (12/23/2021), Chronic diastolic heart failure (12/16/2021), Chronic kidney disease, Chronic midline low back pain without sciatica (06/18/2018), Closed nondisplaced fracture of distal phalanx of left great toe with routine healing (10/22/2018), Coronary artery disease (1993), COVID-19 in immunocompromised patient (02/26/2024), Cystitis (05/10/2022), Depression, Encounter for blood transfusion, Fibromyalgia, Heart failure, Heart transplanted (1993), History of hyperparathyroidism; Hyperparathyroidism, secondary renal, Hypertension, Immune disorder, 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    PSH:   has a past surgical history that includes Cardiac pacemaker removal (Left, 06/26/2014); Bladder surgery (2015 approx); Carpal tunnel release (Left, 03/03/2015); Hysterectomy (1983); Hernia repair (Right, 1971 approx); Breast surgery  (Left, 09/28/2015); Breast surgery (Right, 12/2015); Toe Surgery; Colonoscopy (N/A, 02/25/2021); Breast biopsy (Bilateral); Heart transplant (1993); [place] lymph node biopsy (Left, 10/12/2021); Insertion of tunneled central venous catheter (CVC) with subcutaneous port (N/A, 11/09/2021); Incision and drainage of abscess (Left, 12/24/2021); Removal of vascular access port; Breast lumpectomy (Left, 2021); Injection of anesthetic agent into sacroiliac joint (Right, 08/22/2022); Injection of anesthetic agent around medial branch nerves innervating lumbar facet joint (Right, 10/19/2022); Injection of anesthetic agent around medial branch nerves innervating lumbar facet joint (Right, 11/09/2022); Breast biopsy (Right, 10/31/2022); Radiofrequency thermocoagulation (Right, 12/07/2022); Epidural steroid injection into cervical spine (N/A, 02/02/2023); Cystocele repair; Robot-assisted laparoscopic abdominal sacrocolpopexy (N/A, 8/10/2023); xi robotic urethropexy (N/A, 8/10/2023); and Robot-assisted laparoscopic oophorectomy (Right, 8/10/2023).    FamHx: family history includes Breast cancer in her maternal grandmother and mother; Cancer (age of onset: 38) in her mother; Cataracts in her cousin; Heart disease in her maternal grandmother; Hypertension in her son.    SocHx:  reports that she has never smoked. She has never been exposed to tobacco smoke. She has never used smokeless tobacco. She reports that she does not drink alcohol and does not use drugs.      Physical Exam:  There were no vitals filed for this visit.  General: A&Ox3, no apparent distress, no deformities  Neck: No masses, normal ROM  Lungs: normal inspiration, no use of accessory muscles  Heart: normal pulse, no arrhythmias  Abdomen: Soft, NT, ND, no masses, no hernias, no hepatosplenomegaly  Skin: The skin is warm and dry. No jaundice.  Ext: No c/c/e.    Labs/Studies:   PNE 5/22/24    Procedure: percutaneous InterStim lead placement    Discussion of the  procedure was again had with the patient, consents were obtained.  Patient was placed in the supine position.  After being sterilely prepped and draped, we began the procedure.  Approximately 11-13 cm superior to the coccyx and 2 cm lateral of midline on either side we marked the patient with a marking pen.  Our needle guide was then placed within the right side, in approximately the S3 foramen.  Upon stimulation with the assistance of the [employer] rep, patient noted sensation within the perineum, noting appropriate position for the lead.  The lead was then placed into the needle and again tested and shown to be positive.  The stylet was removed from the wire, leaving our percutaneous wire, again tested and shown to be positive.  We then approached the left side, in the same manner.  Our needle guide was then placed within the left side, in approximately the S3 foramen.  Upon stimulation with the assistance of the [employer] rep, patient noted sensation within the perineum, noting appropriate position for the lead.  The lead was then placed into the needle and again tested and shown to be positive.  The stylet was removed from the wire, leaving our percutaneous wire, again tested and shown to be positive.  Both leads were secured on the patient with the assistance of our [employer] rep and nursing staff.  She will return in a few days to assess and have the wires removed.  Patient tolerated the procedure well and will contact our office with any complaints or issues.      Impression/Plan:       1. Urge incontinence- patient tolerated the percutaneous InterStim trial well today, I will bring her back in a few days to assess effectiveness and determine further therapy options.  Of note she has previously failed VESIcare and myrbetriq.     2. Stress incontinence-  robotic sacrocolpopexy, urethropexy, right oophorectomy OBGYN  8/10/23    No evidence of stress incontinence, nothing further at this juncture.    3. UTI-  no evidence of UTI, call with any issues.

## 2024-05-23 ENCOUNTER — TELEPHONE (OUTPATIENT)
Dept: PRIMARY CARE CLINIC | Facility: CLINIC | Age: 69
End: 2024-05-23
Payer: MEDICARE

## 2024-05-23 NOTE — TELEPHONE ENCOUNTER
Spoke with pt and gave lab results and instructions.    SJ    ----- Message from Elis Wick MD sent at 5/23/2024  7:30 AM CDT -----  Pt does not use [facility] pt portal - please call w message below:    Labwork is stable. Please continue with current plan of care. Make sure you are hydrating!

## 2024-05-27 ENCOUNTER — OFFICE VISIT (OUTPATIENT)
Dept: UROLOGY | Facility: CLINIC | Age: 69
End: 2024-05-27
Payer: MEDICARE

## 2024-05-27 ENCOUNTER — TELEPHONE (OUTPATIENT)
Dept: TRANSPLANT | Facility: CLINIC | Age: 69
End: 2024-05-27
Payer: MEDICARE

## 2024-05-27 ENCOUNTER — TELEPHONE (OUTPATIENT)
Dept: UROLOGY | Facility: CLINIC | Age: 69
End: 2024-05-27

## 2024-05-27 VITALS — BODY MASS INDEX: 28.28 KG/M2 | HEIGHT: 62 IN | WEIGHT: 153.69 LBS

## 2024-05-27 DIAGNOSIS — N30.00 ACUTE CYSTITIS WITHOUT HEMATURIA: ICD-10-CM

## 2024-05-27 DIAGNOSIS — N99.3 PROLAPSE OF VAGINAL CUFF AFTER HYSTERECTOMY: ICD-10-CM

## 2024-05-27 DIAGNOSIS — N39.3 SUI (STRESS URINARY INCONTINENCE, FEMALE): ICD-10-CM

## 2024-05-27 DIAGNOSIS — N39.41 URGE INCONTINENCE OF URINE: Primary | ICD-10-CM

## 2024-05-27 PROCEDURE — 1159F MED LIST DOCD IN RCRD: CPT | Mod: HCNC,CPTII,S$GLB, | Performed by: UROLOGY

## 2024-05-27 PROCEDURE — 99024 POSTOP FOLLOW-UP VISIT: CPT | Mod: HCNC,S$GLB,, | Performed by: UROLOGY

## 2024-05-27 PROCEDURE — 3008F BODY MASS INDEX DOCD: CPT | Mod: HCNC,CPTII,S$GLB, | Performed by: UROLOGY

## 2024-05-27 PROCEDURE — 1101F PT FALLS ASSESS-DOCD LE1/YR: CPT | Mod: HCNC,CPTII,S$GLB, | Performed by: UROLOGY

## 2024-05-27 PROCEDURE — 99999 PR PBB SHADOW E&M-EST. PATIENT-LVL III: CPT | Mod: PBBFAC,HCNC,, | Performed by: UROLOGY

## 2024-05-27 PROCEDURE — 1157F ADVNC CARE PLAN IN RCRD: CPT | Mod: HCNC,CPTII,S$GLB, | Performed by: UROLOGY

## 2024-05-27 PROCEDURE — 3288F FALL RISK ASSESSMENT DOCD: CPT | Mod: HCNC,CPTII,S$GLB, | Performed by: UROLOGY

## 2024-05-27 RX ORDER — CLINDAMYCIN PHOSPHATE 900 MG/50ML
900 INJECTION, SOLUTION INTRAVENOUS
Status: CANCELLED | OUTPATIENT
Start: 2024-05-27

## 2024-05-27 NOTE — TELEPHONE ENCOUNTER
-Discussed clearance with Dr. Tubbs ad will move up Echo from July to this week or next.    ---- Message from Loraine Milanes Flores, RN sent at 5/27/2024  3:02 PM CDT -----  Regarding: Cardiac Clearance  Hello!     This patient will have surgery on June 11 with Dr Cochran ([facility]) and we need a cardiac clearance. Thank you in advance!

## 2024-05-27 NOTE — PROGRESS NOTES
Chief Complaint:   Encounter Diagnoses   Name Primary?    Urge incontinence of urine Yes    FRANKY (stress urinary incontinence, female)     Acute cystitis without hematuria     Prolapse of vaginal cuff after hysterectomy          HPI:   5/27/24- here today to assess her InterStim trial.  Doing much better and what's to move forward with surgery.    5/1/24- patient is here today for the 1st time to see me from [place], she is here today to discuss surgical management for urge incontinence as she has failed medical management.  No specific evidence of stress incontinence or UTI.    11/29/23- LR- Patient is a 68-year-old female that is presenting as a follow-up to overactive bladder.  Patient has urge incontinence and is wearing a pad that she changes several times a day.  She was prescribed Myrbetriq, however, was hospitalized secondary to an increase in potassium.  Patient reports that primary care provider discontinued Myrbetriq secondary to possible interaction related to increase potassium level.  Patient states that medication decreasing her urge incontinence and daytime frequency.  Is requesting a new medication or treatment option.       Allergies:  Lisinopril, Augmentin [amoxicillin-pot clavulanate], and Zyvox [linezolid]    Medications:  has a current medication list which includes the following prescription(s): albuterol, benzonatate, biotin, calcitonin (salmon), carvedilol, cyclosporine modified (neoral), diazepam, ergocalciferol, evening primrose oil, fluticasone propionate, furosemide, hydralazine, hydrocortisone, lidocaine, multivitamin, nifedipine, pantoprazole, patiromer calcium sorbitex, polyethylene glycol, pregabalin, psyllium husk, retacrit, temazepam, UNABLE TO FIND, vitamin e, and zolpidem, and the following Facility-Administered Medications: acetaminophen and famotidine.    Review of Systems:  General: No fever, chills, fatigability, or weight loss.  Skin: No rashes, itching, or changes in color or  texture of skin.  Chest: Denies DONOHUE, cyanosis, wheezing, cough, and sputum production.  Abdomen: Appetite fine. No weight loss. Denies diarrhea, abdominal pain, hematemesis, or blood in stool.  Musculoskeletal: No joint stiffness or swelling. Denies back pain.  : As above.  All other review of systems negative.    PMH:   has a past medical history of Abdominal wall hernia, Abnormal mammogram (10/12/2021), GRACE (acute kidney injury) (11/22/2021), Anxiety, Arthritis, Breast cancer in female (08/2021), Bronchitis (08/18/2016), C. difficile colitis (11/29/2021), Cellulitis of axilla, left (12/23/2021), Chronic diastolic heart failure (12/16/2021), Chronic kidney disease, Chronic midline low back pain without sciatica (06/18/2018), Closed nondisplaced fracture of distal phalanx of left great toe with routine healing (10/22/2018), Coronary artery disease (1993), COVID-19 in immunocompromised patient (02/26/2024), Cystitis (05/10/2022), Depression, Encounter for blood transfusion, Fibromyalgia, Heart failure, Heart transplanted (1993), History of hyperparathyroidism; Hyperparathyroidism, secondary renal, Hypertension, Immune disorder, 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    PSH:   has a past surgical history that includes Cardiac pacemaker removal (Left, 06/26/2014); Bladder surgery (2015 approx); Carpal tunnel release (Left, 03/03/2015); Hysterectomy (1983); Hernia repair (Right,  1971 approx); Breast surgery (Left, 09/28/2015); Breast surgery (Right, 12/2015); Toe Surgery; Colonoscopy (N/A, 02/25/2021); Breast biopsy (Bilateral); Heart transplant (1993); [place] lymph node biopsy (Left, 10/12/2021); Insertion of tunneled central venous catheter (CVC) with subcutaneous port (N/A, 11/09/2021); Incision and drainage of abscess (Left, 12/24/2021); Removal of vascular access port; Breast lumpectomy (Left, 2021); Injection of anesthetic agent into sacroiliac joint (Right, 08/22/2022); Injection of anesthetic agent around medial branch nerves innervating lumbar facet joint (Right, 10/19/2022); Injection of anesthetic agent around medial branch nerves innervating lumbar facet joint (Right, 11/09/2022); Breast biopsy (Right, 10/31/2022); Radiofrequency thermocoagulation (Right, 12/07/2022); Epidural steroid injection into cervical spine (N/A, 02/02/2023); Cystocele repair; Robot-assisted laparoscopic abdominal sacrocolpopexy (N/A, 8/10/2023); xi robotic urethropexy (N/A, 8/10/2023); and Robot-assisted laparoscopic oophorectomy (Right, 8/10/2023).    FamHx: family history includes Breast cancer in her maternal grandmother and mother; Cancer (age of onset: 38) in her mother; Cataracts in her cousin; Heart disease in her maternal grandmother; Hypertension in her son.    SocHx:  reports that she has never smoked. She has never been exposed to tobacco smoke. She has never used smokeless tobacco. She reports that she does not drink alcohol and does not use drugs.      Physical Exam:  There were no vitals filed for this visit.  General: A&Ox3, no apparent distress, no deformities  Neck: No masses, normal ROM  Lungs: normal inspiration, no use of accessory muscles  Heart: normal pulse, no arrhythmias  Abdomen: Soft, NT, ND, leads removed  Skin: The skin is warm and dry. No jaundice.  Ext: No c/c/e.    Labs/Studies:   PNE 5/22/24    Impression/Plan:       1. Urge incontinence- patient did well with the  percutaneous InterStim trial and would like to proceed with surgery.  Will take her for a stage 1 and 3 InterStim  placement, please see below in regards to our discussion today.  Of note she has previously failed VESIcare and myrbetriq.     2. Stress incontinence-  robotic sacrocolpopexy, urethropexy, right oophorectomy OBGYN  8/10/23    No evidence of stress incontinence, nothing further at this juncture.    3. UTI- no evidence of UTI, call with any issues.    Patient understands the risks, benefits and alternatives of the above-stated procedure.  These include but are not limited to damage to the surrounding structures including the subcutaneous soft tissue, spinal nerves, risk of infection requiring removal, risk of it not working sufficiently enough to control her incontinence.  Pain at the lead or device site.  Need for removal for discomfort or readjustment.  Patient understands that she will have to adjust her leads, to control her symptoms.  Patient understands the risks of heart attack, stroke, death, DVT and PE.  Patient understands the risk of bleeding and hematoma formation.  Patient understanding of all the above has elected to pursue the procedure as stated.

## 2024-05-27 NOTE — TELEPHONE ENCOUNTER
Message sent to Dr Chau Knight office in order to get a Cardiac Clearance before surgery.     Loraine Milanes RN

## 2024-05-27 NOTE — H&P (VIEW-ONLY)
Chief Complaint:   Encounter Diagnoses   Name Primary?    Urge incontinence of urine Yes    FRANKY (stress urinary incontinence, female)     Acute cystitis without hematuria     Prolapse of vaginal cuff after hysterectomy          HPI:   5/27/24- here today to assess her InterStim trial.  Doing much better and what's to move forward with surgery.    5/1/24- patient is here today for the 1st time to see me from [place], she is here today to discuss surgical management for urge incontinence as she has failed medical management.  No specific evidence of stress incontinence or UTI.    11/29/23- LR- Patient is a 68-year-old female that is presenting as a follow-up to overactive bladder.  Patient has urge incontinence and is wearing a pad that she changes several times a day.  She was prescribed Myrbetriq, however, was hospitalized secondary to an increase in potassium.  Patient reports that primary care provider discontinued Myrbetriq secondary to possible interaction related to increase potassium level.  Patient states that medication decreasing her urge incontinence and daytime frequency.  Is requesting a new medication or treatment option.       Allergies:  Lisinopril, Augmentin [amoxicillin-pot clavulanate], and Zyvox [linezolid]    Medications:  has a current medication list which includes the following prescription(s): albuterol, benzonatate, biotin, calcitonin (salmon), carvedilol, cyclosporine modified (neoral), diazepam, ergocalciferol, evening primrose oil, fluticasone propionate, furosemide, hydralazine, hydrocortisone, lidocaine, multivitamin, nifedipine, pantoprazole, patiromer calcium sorbitex, polyethylene glycol, pregabalin, psyllium husk, retacrit, temazepam, UNABLE TO FIND, vitamin e, and zolpidem, and the following Facility-Administered Medications: acetaminophen and famotidine.    Review of Systems:  General: No fever, chills, fatigability, or weight loss.  Skin: No rashes, itching, or changes in color or  texture of skin.  Chest: Denies DONOHUE, cyanosis, wheezing, cough, and sputum production.  Abdomen: Appetite fine. No weight loss. Denies diarrhea, abdominal pain, hematemesis, or blood in stool.  Musculoskeletal: No joint stiffness or swelling. Denies back pain.  : As above.  All other review of systems negative.    PMH:   has a past medical history of Abdominal wall hernia, Abnormal mammogram (10/12/2021), GRACE (acute kidney injury) (11/22/2021), Anxiety, Arthritis, Breast cancer in female (08/2021), Bronchitis (08/18/2016), C. difficile colitis (11/29/2021), Cellulitis of axilla, left (12/23/2021), Chronic diastolic heart failure (12/16/2021), Chronic kidney disease, Chronic midline low back pain without sciatica (06/18/2018), Closed nondisplaced fracture of distal phalanx of left great toe with routine healing (10/22/2018), Coronary artery disease (1993), COVID-19 in immunocompromised patient (02/26/2024), Cystitis (05/10/2022), Depression, Encounter for blood transfusion, Fibromyalgia, Heart failure, Heart transplanted (1993), History of hyperparathyroidism; Hyperparathyroidism, secondary renal, Hypertension, Immune disorder, 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    PSH:   has a past surgical history that includes Cardiac pacemaker removal (Left, 06/26/2014); Bladder surgery (2015 approx); Carpal tunnel release (Left, 03/03/2015); Hysterectomy (1983); Hernia repair (Right,  1971 approx); Breast surgery (Left, 09/28/2015); Breast surgery (Right, 12/2015); Toe Surgery; Colonoscopy (N/A, 02/25/2021); Breast biopsy (Bilateral); Heart transplant (1993); [place] lymph node biopsy (Left, 10/12/2021); Insertion of tunneled central venous catheter (CVC) with subcutaneous port (N/A, 11/09/2021); Incision and drainage of abscess (Left, 12/24/2021); Removal of vascular access port; Breast lumpectomy (Left, 2021); Injection of anesthetic agent into sacroiliac joint (Right, 08/22/2022); Injection of anesthetic agent around medial branch nerves innervating lumbar facet joint (Right, 10/19/2022); Injection of anesthetic agent around medial branch nerves innervating lumbar facet joint (Right, 11/09/2022); Breast biopsy (Right, 10/31/2022); Radiofrequency thermocoagulation (Right, 12/07/2022); Epidural steroid injection into cervical spine (N/A, 02/02/2023); Cystocele repair; Robot-assisted laparoscopic abdominal sacrocolpopexy (N/A, 8/10/2023); xi robotic urethropexy (N/A, 8/10/2023); and Robot-assisted laparoscopic oophorectomy (Right, 8/10/2023).    FamHx: family history includes Breast cancer in her maternal grandmother and mother; Cancer (age of onset: 38) in her mother; Cataracts in her cousin; Heart disease in her maternal grandmother; Hypertension in her son.    SocHx:  reports that she has never smoked. She has never been exposed to tobacco smoke. She has never used smokeless tobacco. She reports that she does not drink alcohol and does not use drugs.      Physical Exam:  There were no vitals filed for this visit.  General: A&Ox3, no apparent distress, no deformities  Neck: No masses, normal ROM  Lungs: normal inspiration, no use of accessory muscles  Heart: normal pulse, no arrhythmias  Abdomen: Soft, NT, ND, leads removed  Skin: The skin is warm and dry. No jaundice.  Ext: No c/c/e.    Labs/Studies:   PNE 5/22/24    Impression/Plan:       1. Urge incontinence- patient did well with the  percutaneous InterStim trial and would like to proceed with surgery.  Will take her for a stage 1 and 3 InterStim  placement, please see below in regards to our discussion today.  Of note she has previously failed VESIcare and myrbetriq.     2. Stress incontinence-  robotic sacrocolpopexy, urethropexy, right oophorectomy OBGYN  8/10/23    No evidence of stress incontinence, nothing further at this juncture.    3. UTI- no evidence of UTI, call with any issues.    Patient understands the risks, benefits and alternatives of the above-stated procedure.  These include but are not limited to damage to the surrounding structures including the subcutaneous soft tissue, spinal nerves, risk of infection requiring removal, risk of it not working sufficiently enough to control her incontinence.  Pain at the lead or device site.  Need for removal for discomfort or readjustment.  Patient understands that she will have to adjust her leads, to control her symptoms.  Patient understands the risks of heart attack, stroke, death, DVT and PE.  Patient understands the risk of bleeding and hematoma formation.  Patient understanding of all the above has elected to pursue the procedure as stated.

## 2024-05-28 ENCOUNTER — TELEPHONE (OUTPATIENT)
Dept: NEPHROLOGY | Facility: CLINIC | Age: 69
End: 2024-05-28
Payer: MEDICARE

## 2024-05-28 ENCOUNTER — HOSPITAL ENCOUNTER (OUTPATIENT)
Dept: CARDIOLOGY | Facility: HOSPITAL | Age: 69
Discharge: HOME | End: 2024-05-28
Attending: INTERNAL MEDICINE
Payer: MEDICARE

## 2024-05-28 ENCOUNTER — OFFICE VISIT (OUTPATIENT)
Dept: NEPHROLOGY | Facility: CLINIC | Age: 69
End: 2024-05-28
Payer: MEDICARE

## 2024-05-28 ENCOUNTER — TELEPHONE (OUTPATIENT)
Dept: PRIMARY CARE CLINIC | Facility: CLINIC | Age: 69
End: 2024-05-28
Payer: MEDICARE

## 2024-05-28 VITALS
WEIGHT: 156 LBS | HEART RATE: 86 BPM | RESPIRATION RATE: 18 BRPM | BODY MASS INDEX: 28.71 KG/M2 | DIASTOLIC BLOOD PRESSURE: 88 MMHG | SYSTOLIC BLOOD PRESSURE: 160 MMHG | HEIGHT: 62 IN

## 2024-05-28 VITALS
BODY MASS INDEX: 28.71 KG/M2 | WEIGHT: 156 LBS | HEIGHT: 62 IN | HEART RATE: 85 BPM | DIASTOLIC BLOOD PRESSURE: 88 MMHG | SYSTOLIC BLOOD PRESSURE: 160 MMHG

## 2024-05-28 DIAGNOSIS — T86.20 COMPLICATION OF HEART TRANSPLANT, UNSPECIFIED COMPLICATION: ICD-10-CM

## 2024-05-28 DIAGNOSIS — Z94.1 STATUS POST HEART TRANSPLANT: ICD-10-CM

## 2024-05-28 DIAGNOSIS — N18.4 STAGE 4 CHRONIC KIDNEY DISEASE: Primary | ICD-10-CM

## 2024-05-28 DIAGNOSIS — R06.02 SHORTNESS OF BREATH: ICD-10-CM

## 2024-05-28 DIAGNOSIS — E87.5 HYPERKALEMIA: ICD-10-CM

## 2024-05-28 PROCEDURE — 3066F NEPHROPATHY DOC TX: CPT | Mod: HCNC,CPTII,S$GLB, | Performed by: INTERNAL MEDICINE

## 2024-05-28 PROCEDURE — 1159F MED LIST DOCD IN RCRD: CPT | Mod: HCNC,CPTII,S$GLB, | Performed by: INTERNAL MEDICINE

## 2024-05-28 PROCEDURE — 99999 PR PBB SHADOW E&M-EST. PATIENT-LVL IV: CPT | Mod: PBBFAC,HCNC,, | Performed by: INTERNAL MEDICINE

## 2024-05-28 PROCEDURE — 3077F SYST BP >= 140 MM HG: CPT | Mod: HCNC,CPTII,S$GLB, | Performed by: INTERNAL MEDICINE

## 2024-05-28 PROCEDURE — 99215 OFFICE O/P EST HI 40 MIN: CPT | Mod: HCNC,S$GLB,, | Performed by: INTERNAL MEDICINE

## 2024-05-28 PROCEDURE — 1157F ADVNC CARE PLAN IN RCRD: CPT | Mod: HCNC,CPTII,S$GLB, | Performed by: INTERNAL MEDICINE

## 2024-05-28 PROCEDURE — 3288F FALL RISK ASSESSMENT DOCD: CPT | Mod: HCNC,CPTII,S$GLB, | Performed by: INTERNAL MEDICINE

## 2024-05-28 PROCEDURE — 1101F PT FALLS ASSESS-DOCD LE1/YR: CPT | Mod: HCNC,CPTII,S$GLB, | Performed by: INTERNAL MEDICINE

## 2024-05-28 PROCEDURE — 3079F DIAST BP 80-89 MM HG: CPT | Mod: HCNC,CPTII,S$GLB, | Performed by: INTERNAL MEDICINE

## 2024-05-28 PROCEDURE — 1125F AMNT PAIN NOTED PAIN PRSNT: CPT | Mod: HCNC,CPTII,S$GLB, | Performed by: INTERNAL MEDICINE

## 2024-05-28 PROCEDURE — 3008F BODY MASS INDEX DOCD: CPT | Mod: HCNC,CPTII,S$GLB, | Performed by: INTERNAL MEDICINE

## 2024-05-28 PROCEDURE — 93306 TTE W/DOPPLER COMPLETE: CPT | Mod: 26,HCNC,, | Performed by: INTERNAL MEDICINE

## 2024-05-28 PROCEDURE — 93306 TTE W/DOPPLER COMPLETE: CPT | Mod: HCNC

## 2024-05-28 RX ORDER — ERGOCALCIFEROL 1.25 MG/1
50000 CAPSULE ORAL
Qty: 6 CAPSULE | Refills: 3 | Status: SHIPPED | OUTPATIENT
Start: 2024-05-28

## 2024-05-28 NOTE — TELEPHONE ENCOUNTER
----- Message from Vivian Hu sent at 5/28/2024  9:55 AM CDT -----  Contact: Nadia  Nadia is needing a call back to request blood labs or urine lab before her upcoming appointment today. Please call back at [phone].     Thank you summer

## 2024-05-28 NOTE — PROGRESS NOTES
NEPHROLOGY CLINIC FOLLOWUP NOTE  Date of clinic visit: 5/28/24     REASON FOR FOLLOWUP AND CHIEF COMPLAINT: nephrotic syndrome, CKD, post heart transplant, HTN, nephrolithiasis, hypercalcemia     HISTORY OF PRESENT ILLNESS: Ms. Damon is a 68-year-old female with a history of CKD stage 4 (secondary to kidney biopsy (11/10/20) proven calcineurin inhibitor nephrotoxicity (due to cyclosporine) and hypertensive nephrosclerosis), nephrolithiasis (likely mixed stones), and heart transplant in 1993 (is on cyclosporine), who presents for f/u. Pt was last seen by me in ER about 6 months ago. Pt's current issue is urinary urge incontinence for which she is seeing urology. Placement of a stimulation device has been recommended, which pt has agreed with.     On f/u today, pt reports no new c/o's, no SOB. Labs and meds reviewed. Noted pt is simultaneously on a hypocalcemic and hypercalcemic meds (calcitonin and vit D)     To review: Past mild, persistent hypercalcemia reviewed. Previously suspected related to primary hyperparathyroidism (HPT) is suspected. Sestamibi nuclear scan of the neck did not show any adenomas. Her risk factors for kidney stones include hypercalcemia and being on cyclosporine for prevention of heart transplant rejection, which causes hyperuricemia, and prior intake of vitamin C.. Pt has a h/o of osteopenia.      To review, pt also has breast cancer (she did have persistent mild hypercalcemia in the past). Hem/onc notes were reviewed. 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 Pt has had further complication, requiring hospital admission including pancytopenia, neutropenic fever, axillary cellulitis, and C diff colitis.             PAST MEDICAL HISTORY:  1. CKD stage IV. Nephrotic syndrome. Due to chronic calcineurin inhibitor toxicity due to taking cyclosporine, kidney biopsy was done on 11/10/21  2.  Hypertension.  3. Heart transplant for cardiomyopathy in 1993, the patient has been on chronic  cyclosporine treatment.  4. Carpal tunnel syndrome.  5. Fibromyalgia.  6. GERD.  7. Bell's palsy.  8. Depression.     PAST SURGICAL HISTORY: Reviewed as above, unchanged.     MEDICATIONS: Reviewed     Current Outpatient Medications:     benzonatate (TESSALON) 100 MG capsule, Take 1 capsule by mouth 3 times daily, Disp: 30 capsule, Rfl: 0    biotin 10,000 mcg Cap, Take 1 tablet by mouth once daily., Disp: , Rfl:     carvediloL (COREG) 6.25 MG tablet, Take 1 tablet (6.25 mg total) by mouth 2 (two) times daily with meals., Disp: 180 tablet, Rfl: 3    cycloSPORINE modified, NEORAL, (NEORAL) 25 MG capsule, Take 3 capsules (75 mg total) by mouth 2 (two) times daily., Disp: 540 capsule, Rfl: 1    EVENING PRIMROSE OIL ORAL, Take 1,000 mg by mouth once daily., Disp: , Rfl:     fluticasone propionate (FLONASE) 50 mcg/actuation nasal spray, 2 sprays (100 mcg total) by Each Nostril route once daily., Disp: 16 g, Rfl: 1    furosemide (LASIX) 20 MG tablet, TAKE 1 TABLET EVERY DAY, Disp: 90 tablet, Rfl: 3    hydrALAZINE (APRESOLINE) 50 MG tablet, Take 1 tablet (50 mg total) by mouth every 8 (eight) hours., Disp: 270 tablet, Rfl: 3    hydrocortisone 1 % cream, APPLY TOPICALLY 2 (TWO) TIMES DAILY., Disp: 35 g, Rfl: 3    LIDOcaine (LIDODERM) 5 %, PLACE 1 PATCH ONTO THE SKIN DAILY AS NEEDED (BACK PAIN). REMOVE AND DISCARD PATCH WITHIN 12 HOURS OR AS DIRECTED BY MD, Disp: 60 patch, Rfl: 5    multivitamin capsule, Take 1 capsule by mouth once daily., Disp: , Rfl:     NIFEdipine (PROCARDIA-XL) 30 MG (OSM) 24 hr tablet, TAKE 1 TABLET ONE TIME DAILY, Disp: 90 tablet, Rfl: 3    pantoprazole (PROTONIX) 20 MG tablet, Take 1 tablet (20 mg total) by mouth once daily., Disp: 30 tablet, Rfl: 11    polyethylene glycol (GLYCOLAX) 17 gram PwPk, Take 17 g by mouth once daily., Disp: , Rfl:     pregabalin (LYRICA) 50 MG capsule, Take 1 capsule (50 mg total)  by mouth 2 (two) times daily., Disp: 180 capsule, Rfl: 0    psyllium husk (METAMUCIL ORAL), Take 17 g by mouth once daily., Disp: , Rfl:     RETACRIT 20,000 unit/mL injection, , Disp: , Rfl:     temazepam (RESTORIL) 30 mg capsule, Take 1 capsule (30 mg total) by mouth nightly as needed for Insomnia., Disp: 90 capsule, Rfl: 0    UNABLE TO FIND, medication name: Stool softener (Ducolax) Laxative, Disp: , Rfl:     vitamin E 400 UNIT capsule, Take 400 Units by mouth once daily., Disp: , Rfl:     zolpidem (AMBIEN) 5 MG Tab, Take 1 tablet (5 mg total) by mouth nightly as needed (insomnia)., Disp: 90 tablet, Rfl: 0    albuterol (VENTOLIN HFA) 90 mcg/actuation inhaler, Inhale 2 puffs into the lungs every 6 (six) hours as needed for Wheezing or Shortness of Breath. Rescue, Disp: 18 g, Rfl: 1    diazePAM (VALIUM) 5 MG tablet, Take 1 tablet (5 mg total) by mouth once. for 1 dose, Disp: 1 tablet, Rfl: 0    ergocalciferol (VITAMIN D2) 50,000 unit Cap, Take 1 capsule (50,000 Units total) by mouth every 7 days., Disp: 6 capsule, Rfl: 3    patiromer calcium sorbitex (VELTASSA) 8.4 gram PwPk, Take 1 packet (8.4 g total) by mouth every other day., Disp: 15 packet, Rfl: 3  Calcitonin nasal spray      SOCIAL HISTORY: Reviewed. Negative for smoking. No alcohol use.      REVIEW OF SYSTEMS: No recent hospitalizations.  GENERAL: Negative.  HEAD, EYES, EARS, NOSE, AND THROAT: Negative.  CARDIAC: Negative.  PULMONARY: Negative.  GASTROINTESTINAL: Negative.  GENITOURINARY: Negative.  PSYCHOLOGICAL: Negative.  NEUROLOGICAL: Negative.  ENDOCRINE: Negative.  HEMATOLOGIC AND ONCOLOGIC: Negative.  INFECTIOUS DISEASE: Negative.  The rest of the review of systems negative.     PHYSICAL EXAMINATION:  VITAL SIGNS: Repeat blood pressure is 160/88, repeat 130/70, Pulse is 84, weight is 70.8 Kg, from 72.2 Kg,   GENERAL: She is cooperative, pleasant, in no acute distress.   Speech and thought process appropriate, normal.  HEENT: Mucous membranes  moist.  NECK: Neck JVD. Normal hearing.  ABDOMEN: Soft, nontender.  EXTREMITIES: trace pitting edema.     LABS: Reviewed.   BMP  Lab Results   Component Value Date     05/20/2024    K 3.9 05/20/2024     05/20/2024    CO2 20 (L) 05/20/2024    BUN 42 (H) 05/20/2024    CREATININE 2.6 (H) 05/20/2024    CALCIUM 8.9 05/20/2024    ANIONGAP 12 05/20/2024    EGFRNORACEVR 19 (A) 05/20/2024     Lab Results   Component Value Date    WBC 4.15 05/20/2024    HGB 9.2 (L) 05/20/2024    HCT 28.9 (L) 05/20/2024    MCV 90 05/20/2024     05/20/2024       Lab Results   Component Value Date    .2 (H) 05/20/2024    CALCIUM 8.9 05/20/2024    CAION 1.13 09/05/2018    PHOS 3.8 05/20/2024                 Urine protein/cr 0.54 g, from 0.32 g, from 0.49 g, from 2.2 g, 6.0 g  U/a: no protein (from 3+), no blood, 4 RBC's, no casts     Complements C3 and C4 both normal     To review older labs:   24 hour urine: Ca not measured, uric acid 138, Oxalate 20, citrate 203  U/a unremarkable  Urine Cx: neg   Urine P/Cr 740 mg     KUB: unremarkable, except for some constipation     Renal u/s: FINDINGS: 10/22/20  Kidneys measure 10.3 and 9.7 cm in length on the right left respectively.  Cortex is smoothly marginated well maintained with mild diffuse increased echotexture.  Appearance suggest medical renal disease.  Two simple cyst identified within the right kidney.  Upper pole cyst 1.4 cm maximum diameter and inferior pole cyst 2.2 cm maximum diameter.  There is 9 mm simple cyst within the left inferior pole.  Resistive indices are 0.66 and 0.76 on the right left respectively.  Urinary bladder partially distended without focal or diffuse wall thickening.     CT abd: The left kidney appears slightly small.  Right kidney is grossly normal in size.  No obvious hydronephrosis or nephrolithiasis     Sestamibi nuclear scan of the neck: 8/29/19: no adenomas        Kidney biopsy from 11/10/21:                   ASSESSMENT AND PLAN: This is  a 68-year-old female who presents for CKD follow up:  Patient has a h/o of heart transplant  The impression is as follows:     1. Renal: s Cr stable, overall slowly progressive CKD  Stable renal function. Stage 4 CKD  Nephrotic syndrome: good response to losartan: proteinuria further improved from 6 to 2.2 g, and now to < 1 g, stable  BP controlled      Complications of CKD:  K, Improved and lower on veltassa, continue but lower the dose from qd to qod  Na normal  Acid base stable  Ca normal, see below  PO4 normal  Secondary hyperparathyroidism: moderate. Holding treatment with calcitriol, because in the past had hypercalcemia (and past h/o of kidney stones). Noted simultaneously on calcitonin (to keep ca lower) and q week ergocalciferol (that raises ca). Recommend d/c calcitonin and lower ergocalciferol from q week to q 2 weeks.  Anemia:mild, multifactorial, seeing hem/onc     Kidney biopsy showed:  Damage by HTN (hypertensive nephrosclerosis) as well as calcineurin inhibitor toxicity due to taking cyclosporine to prevent heart transplant rejection. There was 50% chronic interstitial fibrosis.  Pt has been advised of the kidney biopsy in layman's language  She was specifically advised NOT to stop cyclosporine of change the dose on her own.     2. HTN: BP controlled  Reviewed meds with pt     3. Nephrolithiasis: no new stones. To review:  Has multiple risk factors for kidney stones: (hyperuricemia from cyclosporine, diet rich in uric acid, previously taking Vit C, vit D, and calcium,, CKD, and having primary hyperparathyroidism)  Stones are likely mixed ca oxalate and uric acid kidney stones  No longer taking Vit C  No longer takes Vit D and Ca.   Mild hyperuricemia, from cyclosporine.  F/u CT did not show any stones  U/s showed non-obstructive 2 R stones, relatively large  24 hour urine for kidney stone stratification shows hypocitraturia  Pt was advised NOT TO STOP cyclosporine.     4. History of heart transplant  on cyclosporine  Per heart transplant team.  Do NOT stop cyclosporine      5. Elevated iPTH, likely has primary hyperparathyroidism (HPT)  No adenomas found on sestamibi nulcear scan  May have diffuse hyperplasia  Primary HPT can explain increased risk of kidney stones     6. New diagnosis of breast cancer: since her last visit with us (she did have persistent mild hypercalcemia in the past). Hem/onc notes were reviewed. 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 Pt has had further complication, requiring hospital admission including pancytopenia, neutropenic fever, axillary cellulitis, and C diff colitis.  Will defer to hem/onc     7. Anemia: normocytic. Likely multifactorial: due to CKD and cancer.  Advised pt to have PRBC transfusion. Last transfusion was in Nov 2021. Pt declined  Has f/u with hem/onc on 1/20/21. Please evaluate for epogen and IV iron therapy.    8. Urinary urge incontinence: following with urology  Noted plans for placement of stimulation device  Renally cleared.              PLANS AND RECOMMENDATIONS:   As discussed above  Opportunity for questions provided  Complicated case, multiple issues addressed  RTC 6 months  Total time spent 40 minutes. Extensive time spent including the time to review the records, the patient evaluation, documentation, face-to-face  discussion with the patient. More than 50% of the time was spent in counseling  and coordination of care. Level V visit.     Carter Crawford MD

## 2024-05-29 LAB
AORTIC ROOT ANNULUS: 3 CM
AORTIC VALVE MEAN VELOCITY: 0.84 M/S
AV INDEX (PROSTH): 0.79
AV LVOT MEAN GRADIENT: 2 MMHG
AV LVOT PEAK GRADIENT: 4 MMHG
AV MEAN GRADIENT: 3 MMHG
AV PEAK GRADIENT: 5 MMHG
AV VALVE AREA BY VELOCITY RATIO: 2.36 CM²
AV VALVE AREA: 2.18 CM²
AV VELOCITY RATIO: 0.85
BSA FOR ECHO PROCEDURE: 1.76 M2
CV ECHO LV RWT: 0.38 CM
DOP CALC AO PEAK VEL: 1.14 M/S
DOP CALC AO VTI: 25.3 CM
DOP CALC LVOT AREA: 2.8 CM2
DOP CALC LVOT DIAMETER: 1.88 CM
DOP CALC LVOT PEAK VEL: 0.97 M/S
DOP CALC LVOT STROKE VOLUME: 55.21 CM3
DOP CALC MV VTI: 5.8 CM
DOP CALC RVOT PEAK VEL: 0.62 M/S
DOP CALC RVOT VTI: 11.7 CM
DOP CALCLVOT PEAK VEL VTI: 19.9 CM
E WAVE DECELERATION TIME: 130.93 MSEC
E/A RATIO: 1.18
E/E' RATIO: 7.91 M/S
ECHO LV POSTERIOR WALL: 0.8 CM (ref 0.6–1.1)
FRACTIONAL SHORTENING: 38 % (ref 28–44)
HCV RNA # SERPL NAA+PROBE: 260.6 MMHG/S
INTERVENTRICULAR SEPTUM: 1.11 CM (ref 0.6–1.1)
IVRT: 102.76 MSEC
LEFT INTERNAL DIMENSION IN SYSTOLE: 2.61 CM (ref 2.1–4)
LEFT VENTRICLE DIASTOLIC VOLUME INDEX: 46.41 ML/M2
LEFT VENTRICLE DIASTOLIC VOLUME: 79.82 ML
LEFT VENTRICLE MASS INDEX: 76 G/M2
LEFT VENTRICLE SYSTOLIC VOLUME INDEX: 14.4 ML/M2
LEFT VENTRICLE SYSTOLIC VOLUME: 24.77 ML
LEFT VENTRICULAR INTERNAL DIMENSION IN DIASTOLE: 4.23 CM (ref 3.5–6)
LEFT VENTRICULAR MASS: 130.22 G
LEFT VENTRICULAR OUTFLOW TRACT PEAK GRADIENT REST: 4 MMHG
LEFT VENTRICULAR POSTERIOR WALL IN END SYSTOLE: 0.8 CM
LV LATERAL E/E' RATIO: 5.44 M/S
LV SEPTAL E/E' RATIO: 14.5 M/S
LVOT MG: 2.13 MMHG
LVOT MV: 0.69 CM/S
Lab: 0.7 MMHG
Lab: 13.5
Lab: 83 BPM
MV PEAK A VEL: 0.74 M/S
MV PEAK E VEL: 0.87 M/S
MV STENOSIS PRESSURE HALF TIME: 37.97 MS
MV VALVE AREA BY CONTINUITY EQUATION: 9.52 CM2
MV VALVE AREA P 1/2 METHOD: 5.79 CM2
OHS CV RV/LV RATIO: 1.1 CM
PISA TR MAX VEL: 3.35 M/S
PV MEAN GRADIENT: 1 MMHG
PV PEAK GRADIENT: 2 MMHG
PV PEAK VELOCITY: 0.76 M/S
RA PRESSURE ESTIMATED: 8 MMHG
RIGHT VENTRICLAR OUTFLOW TRACT PEAK GRADIENT: 1.5 MMHG
RIGHT VENTRICULAR END-DIASTOLIC DIMENSION: 4.67 CM
RV TB RVSP: 11 MMHG
SINUS: 2.89 CM
STJ: 2.86 CM
TDI LATERAL: 0.16 M/S
TDI SEPTAL: 0.06 M/S
TDI: 0.11 M/S
TR MAX PG: 45 MMHG
TRICUSPID ANNULAR PLANE SYSTOLIC EXCURSION: 1.99 CM
TV REST PULMONARY ARTERY PRESSURE: 53 MMHG
Z-SCORE OF LEFT VENTRICULAR DIMENSION IN END DIASTOLE: -1.2
Z-SCORE OF LEFT VENTRICULAR DIMENSION IN END SYSTOLE: -0.98
Z-SCORE OF LEFT VENTRICULAR POSTERIOR WALL IN END SYSTOLE: -3.37

## 2024-05-30 ENCOUNTER — TELEPHONE (OUTPATIENT)
Dept: UROLOGY | Facility: CLINIC | Age: 69
End: 2024-05-30
Payer: MEDICARE

## 2024-05-30 NOTE — TELEPHONE ENCOUNTER
----- Message from Carolina Elder sent at 5/30/2024  8:16 AM CDT -----  Regarding: RE: Cardiac Clearance  She completed the echo Yesterday  Dr. Tubbs was looking over her chart.  ----- Message -----  From: Milanes Flores, Loraine, RENETTA  Sent: 5/30/2024   7:36 AM CDT  To: Carolina Elder  Subject: RE: Cardiac Clearance                            Good morning!     Thank your for your answer, does she need to call you to schedule any appointment?  ----- Message -----  From: Carolina Elder  Sent: 5/27/2024   3:43 PM CDT  To: Loraine Milanes Flores, RN  Subject: RE: Cardiac Clearance                            Dr Tubbs would like her to get an echo before Clearing her.  ----- Message -----  From: Milanes Flores, Loraine, RN  Sent: 5/27/2024   3:04 PM CDT  To: Chau ASHLEY Staff  Subject: Cardiac Clearance                                Hello!     This patient will have surgery on June 11 with Dr Cochran ([facility]) and we need a cardiac clearance. Thank you in advance!   Patient contacted & notified of provider-disqualified E/V-visit.   Patient informed that they would not be charged for the visit.     Patient was bending over cleaning.  When got up hit back of head on a cabinet. No loss of consciousness.  Some nausea but no vomiting.   Still has some pressure in head - sometimes just to area that was hit.  Other times is on top of head and sometimes goes down to nose.  Has been trying ice, heat, and ibuprofen.      PLAN per Virtual provider:  Directed to Urgent Care/Immediate Care Clinic as soon as possible - Patient already has a Walk in Clinic appointment scheduled.  Patient offered PCP appointment for tomorrow.  Patient says she will keep her Walk in Clinic appointment.    Patient/Caller agrees to follow recommendations.

## 2024-06-05 ENCOUNTER — TELEPHONE (OUTPATIENT)
Dept: PRIMARY CARE CLINIC | Facility: CLINIC | Age: 69
End: 2024-06-05
Payer: MEDICARE

## 2024-06-05 ENCOUNTER — DOCUMENTATION ONLY (OUTPATIENT)
Dept: PRIMARY CARE CLINIC | Facility: CLINIC | Age: 69
End: 2024-06-05
Payer: MEDICARE

## 2024-06-05 NOTE — PROGRESS NOTES
Pt concerned w increased pain around hips jacques on R - h/o nerve ablation this area w Pain Med Dr. Pierre    Advised patient ok to take acetaminophen 500 mg 4x daily.  Advised to keep lidocaine patches off for minimum 10 hrs between uses    CT Thoracic Spine Without Contrast 1/09/23  1. Degenerative disc changes, worst at T11-T12.  Evaluation for central canal narrowing is limited without intrathecal contrast.  2. Bony neural foraminal narrowing is worst at the right T11-T12 foramen.

## 2024-06-05 NOTE — TELEPHONE ENCOUNTER
Spoke with pt and related the message to provider.    SJ    ----- Message from Liana Parker sent at 6/5/2024 10:03 AM CDT -----  Pt called stating that she has been experiencing pain in her chest/shoulders and wanted to know if she could get a referral for a bone density test or an x-ray before her surgery.She also asked if she could receive a call at [phone].Please advise

## 2024-06-06 NOTE — PRE-PROCEDURE INSTRUCTIONS
.Pre op instructions reviewed with patient over telephone, verbalized understanding.    To confirm, Surgery is scheduled on 6/11/24. We will call you late afternoon the business day prior to surgery with your arrival time.    *Please report to the [facility] (1st Floor) located off of [facility] (2nd Entrance/Building on the left, in front of the flag pole).  Address: [address] [place] [address] [place]. [address]      INSTRUCTIONS IMPORTANT!!!  DO NOT Eat, Drink, or Smoke after 12 midnight unless instructed otherwise by your Surgeon. OK to brush teeth, no gum, candy or mints!    >>>MEDICATION INSTRUCTIONS<<<: Morning of Surgery, take small sip of water with ONLY these medications:  Carvedilol (Coreg)  Procardia  Cyclosporine (Neoral)  Hydralazine (Apresoline)  Pantoprazole ( Protonix)  Pregabalin (Lyrica)    PLEASE HOLD ALL VITAMINS AND HERBAL SUPPLEMENTS AS OF TODAY      If your are taking Ozempic/ Mounjaro/ Wegovy/ Trulicity/ Semaglutide injections or weight loss medication please notify us immediately as they must be stopped 7 days prior to surgery.    !!!STOP ALL Aspirins, NSAIDS, WEIGHT LOSS INJECTIONS/PILLS, Herbal supplements, & Vitamins 7 DAYS BEFORE SURGERY!!!    ____  Avoid Alcoholic beverages 3 days prior to surgery, as it can thin the blood.  ____  NO Acrylic/fake nails or nail polish worn day of surgery (specifically hand/arm & foot surgeries).  ____  NO powder, lotions, deodorants, oils or cream on body.  ____  Remove all jewelry & piercings & foreign objects before arrival & leave at home.  ____  Remove Dentures, Hearing Aids & Contact Lens prior to surgery.  ____  Bring photo ID and insurance information to hospital (Leave Valuables at Home).  ____  If going home the same day, arrange for a ride home. You will not be able to drive for 24 hrs if Anesthesia was used.   ____  Females (ages 11-60): may need to give a urine sample the morning of surgery; please see Pre op Nurse prior to  using the restroom.  ____  Wear clean, loose fitting clothing to allow for dressings/ bandages.      Bathing Instructions:    -Shower with anti-bacterial Soap (Hibiclens or Dial) the night before surgery and the morning of.   -Do not use Hibiclens on your face or genitals.   -Apply clean clothes after shower.  -Do not shave your face or body 3 days prior to surgery unless instructed otherwise by your Surgeon.    [facility] Visitor/Ride Policy:  Only 2 adults allowed in pre op/recovery area during your procedure. You MUST HAVE A RIDE HOME from a responsible adult that you know and trust. Medical Transport, Uber or Lyft can ONLY be used if patient has a responsible adult to accompany them during ride home.       *Signs and symptoms of Infection Before or After Surgery:               !!!If you experience any fever, chills, nausea/ vomiting, foul odor/ excessive drainage from surgical site, flu-like symptoms, new wounds or cuts, PLEASE CALL THE SURGEON OFFICE at [phone] or SEND MESSAGE THROUGH [facility]!!!     *If you are running late the morning of surgery, please call the Hospital Surgery Dept @ [phone].     *Billing questions:  [phone]     Thank you,  -[facility] Pre Admit Dept.  [phone]   or [phone]  M-F 7:30 am-4:00 pm (Closed Major Holidays)

## 2024-06-09 PROBLEM — M81.8 OTHER OSTEOPOROSIS WITHOUT CURRENT PATHOLOGICAL FRACTURE: Chronic | Status: ACTIVE | Noted: 2019-08-30

## 2024-06-09 PROBLEM — N30.00 ACUTE CYSTITIS WITHOUT HEMATURIA: Status: RESOLVED | Noted: 2022-05-10 | Resolved: 2024-06-09

## 2024-06-10 ENCOUNTER — TELEPHONE (OUTPATIENT)
Dept: PREADMISSION TESTING | Facility: HOSPITAL | Age: 69
End: 2024-06-10
Payer: MEDICARE

## 2024-06-10 ENCOUNTER — ANESTHESIA EVENT (OUTPATIENT)
Dept: SURGERY | Facility: HOSPITAL | Age: 69
End: 2024-06-10
Payer: MEDICARE

## 2024-06-10 NOTE — ANESTHESIA PREPROCEDURE EVALUATION
"                                                                                                             06/10/2024  Nadia Damon is a 68 y.o., female     *See below for recent progress note via Dr. Tubbs (Transplant):    "Patient is at mod-high risk for general anesthesia of n setting of most recent echo and heart transoms transplant."    Patient Active Problem List   Diagnosis    Heart transplanted    Primary insomnia    CKD (chronic kidney disease) stage 4, GFR 15-29 ml/min    Gastroesophageal reflux disease without esophagitis    Essential hypertension    Aortic atherosclerosis    Secondary hyperparathyroidism, renal    Hyperkalemia    Other osteoporosis without current pathological fracture    Immunocompromised patient    Ductal carcinoma in situ (DCIS) of left breast    Ulnar neuropathy of left upper extremity    Chronic diastolic (congestive) heart failure    Anemia associated with stage 4 chronic renal failure    Secondary and unspecified malignant neoplasm of axilla and upper limb lymph nodes    Borderline abnormal TFTs    Other hyperlipidemia    DDD (degenerative disc disease), thoracolumbar    Ventral hernia without obstruction or gangrene    Acquired hypothyroidism    FRANKY (stress urinary incontinence, female)    Prolapse of vaginal cuff after hysterectomy    Urge incontinence of urine    Orthopedic device, implant, or graft complication     Past Medical History:   Diagnosis Date    Abdominal wall hernia     CT Renal 6/11/2018---Small fat containing superior ventral abdominal wall hernia at the epicardial pacing lead site.    Abnormal mammogram 10/12/2021    Acute cystitis without hematuria 05/10/2022    GRACE (acute kidney injury) 11/22/2021    Anxiety     Arthritis     ZEN HIPS    Breast cancer in female 08/2021    LEFT BREAST    Bronchitis 08/18/2016    Never smoked      C. difficile colitis 11/29/2021    Cellulitis of axilla, left 12/23/2021    Chronic diastolic heart failure 12/16/2021    " Chronic kidney disease     stage 4, GFR 15-29 ml/min    Chronic midline low back pain without sciatica 06/18/2018    Closed nondisplaced fracture of distal phalanx of left great toe with routine healing 10/22/2018    Coronary artery disease 1993    heart transplant    COVID-19 in immunocompromised patient 02/26/2024    Cystitis 05/10/2022    Depression     Encounter for blood transfusion     Fibromyalgia     on Lyrica    Heart failure     native heart cardiomyopathy    Heart transplanted 1993    due to cardiomyopathy    History of hyperparathyroidism; Hyperparathyroidism, secondary renal     PT DENIES    Hypertension     Immune disorder     anti rejection meds    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    Impaired mobility 07/28/2022    Iron deficiency anemia 08/15/2017    Kidney stones     passed per pt    Obesity     Obesity (BMI 30.0-34.9) 07/22/2019    Other osteoporosis without current pathological fracture 08/30/2019    Other pancytopenia 05/06/2024    Parotitis, acute 09/19/2023    Pharyngitis 01/09/2019    Pneumonia due to infectious organism 03/14/2024    PONV (postoperative nausea and vomiting)     Severe sepsis 11/22/2021    Shingles 2003 approx    left leg    Subclinical hypothyroidism 06/16/2023    Thrombocytopenia, unspecified 11/29/2021    Trouble in sleeping     Urinary incontinence      Past Surgical History:   Procedure Laterality Date    BLADDER SURGERY  2015 approx    mesh - Dr Everett then 2nd reconstructive sx Dr Onofre    BREAST BIOPSY Bilateral     NEGATIVE    BREAST BIOPSY Right 10/31/2022    benign    BREAST LUMPECTOMY Left 2021    BREAST SURGERY Left 09/28/2015    Bx - benign    BREAST SURGERY Right 12/2015    Bx benign    CARDIAC PACEMAKER REMOVAL Left 06/26/2014    Pacer defirillator removed. Put in 1993 aat time of heart transplant    CARPAL TUNNEL RELEASE Left 03/03/2015    Dr. Hall    COLONOSCOPY N/A 02/25/2021    Procedure: COLONOSCOPY;  Surgeon: Freida GLORIA  MD Ashley;  Location: [facility] ENDO;  Service: Endoscopy;  Laterality: N/A;    CYSTOCELE REPAIR      Twice with mesh removal    EPIDURAL STEROID INJECTION INTO CERVICAL SPINE N/A 02/02/2023    Procedure: T11/T12 IL HELLEN;  Surgeon: Jassi Pierre MD;  Location: [facility] PAIN MGT;  Service: Pain Management;  Laterality: N/A;    HEART TRANSPLANT  1993    HERNIA REPAIR Right 1971 approx    Inguinal    HYSTERECTOMY  1983    vag hyst /LSO     INCISION AND DRAINAGE OF ABSCESS Left 12/24/2021    Procedure: INCISION AND DRAINAGE, ABSCESS;  Surgeon: Joseph Longo MD;  Location: [facility] OR;  Service: General;  Laterality: Left;    INJECTION OF ANESTHETIC AGENT AROUND MEDIAL BRANCH NERVES INNERVATING LUMBAR FACET JOINT Right 10/19/2022    Procedure: Right L4/L5 and L5/S1 MBB;  Surgeon: Jassi Pierre MD;  Location: [facility] PAIN MGT;  Service: Pain Management;  Laterality: Right;    INJECTION OF ANESTHETIC AGENT AROUND MEDIAL BRANCH NERVES INNERVATING LUMBAR FACET JOINT Right 11/09/2022    Procedure: Right L4/L5 and L5/S1 MBB;  Surgeon: Jassi Pierre MD;  Location: [facility] PAIN MGT;  Service: Pain Management;  Laterality: Right;    INJECTION OF ANESTHETIC AGENT INTO SACROILIAC JOINT Right 08/22/2022    Procedure: Right SIJ Injection Right L5/S1 Facte Injection;  Surgeon: Jassi Pierre MD;  Location: [facility] PAIN MGT;  Service: Pain Management;  Laterality: Right;    INSERTION OF TUNNELED CENTRAL VENOUS CATHETER (CVC) WITH SUBCUTANEOUS PORT N/A 11/09/2021    Procedure: [id number];  Surgeon: Christoph Douglas MD;  Location: [facility] OR;  Service: General;  Laterality: N/A;    RADIOFREQUENCY THERMOCOAGULATION Right 12/07/2022    Procedure: Right L4/L5 and L5/S1 Lumbar RFA;  Surgeon: Jassi Pierre MD;  Location: [facility] PAIN MGT;  Service: Pain Management;  Laterality: Right;    REMOVAL OF VASCULAR ACCESS PORT      ROBOT-ASSISTED LAPAROSCOPIC ABDOMINAL SACROCOLPOPEXY N/A 08/10/2023    Procedure: ROBOTIC SACROCOLPOPEXY, ABDOMEN;   Surgeon: PRANAY Villalobos MD;  Location: [facility] OR;  Service: OB/GYN;  Laterality: N/A;    ROBOT-ASSISTED LAPAROSCOPIC OOPHORECTOMY Right 08/10/2023    Procedure: ROBOTIC OOPHORECTOMY;  Surgeon: PRANAY Villalobos MD;  Location: [facility] OR;  Service: OB/GYN;  Laterality: Right;    SENTINEL LYMPH NODE BIOPSY Left 10/12/2021    Procedure: BIOPSY, LYMPH NODE, SENTINEL;  Surgeon: Christoph Douglas MD;  Location: [facility] OR;  Service: General;  Laterality: Left;    TOE SURGERY      XI ROBOTIC URETHROPEXY N/A 08/10/2023    Procedure: XI ROBOTIC URETHROPEXY;  Surgeon: PRANAY Villalobos MD;  Location: [facility] OR;  Service: OB/GYN;  Laterality: N/A;     ECHO: (5/15/24)  Summary         Left Ventricle: The left ventricle is normal in size. Normal wall thickness. There is mild asymmetric hypertrophy. Normal wall motion. Septal flattening in systole consistent with right ventricular pressure overload. There is normal systolic function with a visually estimated ejection fraction of 55 - 60%. There is diastolic dysfunction but grade cannot be determined.    Right Ventricle: Moderate right ventricular enlargement. Wall thickness is normal. Systolic function is normal.    Left Atrium: Left atrium is severely dilated.    Right Atrium: Right atrium is moderately dilated.    Tricuspid Valve: There is moderate regurgitation.    IVC/SVC: Intermediate venous pressure at 8 mmHg.    Pre-op Assessment    I have reviewed the Patient Summary Reports.    I have reviewed the NPO Status.   I have reviewed the Medications.     Review of Systems  Anesthesia Hx:  No problems with previous Anesthesia   History of prior surgery of interest to airway management or planning:  Previous anesthesia: General          Denies Personal Hx of Anesthesia complications.                    Social:  Non-Smoker, No Alcohol Use       Hematology/Oncology:    Oncology Normal    -- Anemia:               Hematology Comments: 9.2/28.9                    Cardiovascular:     Pacemaker Hypertension   CAD       CHF       ECG has been reviewed. Normal sinus rhythm   Right bundle branch block   Left anterior fascicular block    Bifascicular block   T wave abnormality, consider inferolateral ischemia   Abnormal ECG   When compared with ECG of 15-NOV-2023 15:23,   T wave inversion more evident in Inferior leads   Confirmed by CURT BAKER MD ([id number]) on 12/26/2023 3:40:26 PM     S/p heart transplant                         Pulmonary:  Pneumonia                      Renal/:  Chronic Renal Disease, CKD, ARF   CKD-4    BUN 42  Cre 2.6             Hepatic/GI:     GERD             Musculoskeletal:  Arthritis          Spine Disorders:  Degenerative disease, Disc disease and Chronic Pain           Neurological:    Neuromuscular Disease,       Fibromyalgia                            Endocrine:   Hypothyroidism  BMI 29      Obesity / BMI > 30  Psych:    depression                Chemistry        Component Value Date/Time     05/20/2024 1244    K 3.9 05/20/2024 1244     05/20/2024 1244    CO2 20 (L) 05/20/2024 1244    BUN 42 (H) 05/20/2024 1244    CREATININE 2.6 (H) 05/20/2024 1244    GLU 92 05/20/2024 1244        Component Value Date/Time    CALCIUM 8.9 05/20/2024 1244    ALKPHOS 97 04/05/2024 1243    AST 31 04/05/2024 1243    ALT 21 04/05/2024 1243    BILITOT 0.5 04/05/2024 1243    ESTGFRAFRICA 19 (A) 07/14/2022 1100    EGFRNONAA 17 (A) 07/14/2022 1100        Lab Results   Component Value Date    WBC 4.15 05/20/2024    HGB 9.2 (L) 05/20/2024    HCT 28.9 (L) 05/20/2024    MCV 90 05/20/2024     05/20/2024           Physical Exam  General: Well nourished, Cooperative, Alert and Oriented    Airway:  Mallampati: II   Mouth Opening: Normal  TM Distance: Normal  Tongue: Normal  Neck ROM: Normal ROM    Dental:  Partial Dentures, Dentures  Upper and lower partials (will remove); denies any loose teeth  Musculoskeletal:  2+ pitting edema in B/L Les up to knee      Anesthesia Plan  Type of  Anesthesia, risks & benefits discussed:    Anesthesia Type: Gen ETT  Intra-op Monitoring Plan: Standard ASA Monitors  Post Op Pain Control Plan: multimodal analgesia and IV/PO Opioids PRN  Induction:  IV  Airway Plan: Direct, Post-Induction  Informed Consent: Informed consent signed with the Patient and all parties understand the risks and agree with anesthesia plan.  All questions answered.   ASA Score: 3  Day of Surgery Review of History & Physical: H&P Update referred to the surgeon/provider.  Anesthesia Plan Notes: Intubation:     Induction:  Intravenous    Intubated:  Postinduction    Mask Ventilation:  N/a    Attempts:  1    Attempted By:  CRNA and student    Method of Intubation:  Direct    Blade:  Smith 2    Laryngeal View Grade: Grade I - full view of cords      Difficult Airway Encountered?: No      Complications:  None    Airway Device Size:  7.0    Style/Cuff Inflation:  Cuffed (inflated to minimal occlusive pressure)    Inflation Amount (mL):  7    Tube secured:  22    Secured at:  The lips    Placement Verified By:  Capnometry    Complicating Factors:  None    Findings Post-Intubation:  BS equal bilateral and atraumatic/condition of teeth unchanged    Ready For Surgery From Anesthesia Perspective.     .

## 2024-06-10 NOTE — PROGRESS NOTES
Patient is at mod-high risk for general anesthesia of n setting of most recent echo and heart transoms transplant.

## 2024-06-10 NOTE — TELEPHONE ENCOUNTER
Called and spoke with pt about the following:     Please arrive to [facility] ([facility] at 0530am on 6/11/2024 for your scheduled procedure.  Address: [address] [place] [address] [place] [address] ([address] Building on left, 1st Floor Lobby)  >>>NO eating or drinking after midnight unless instructed otherwise by your Surgeon<<<    Thank you,  -[facility] Pre Admit Testing Dept.  Mon-Fri 7:30 am - 4 pm [phone]

## 2024-06-11 ENCOUNTER — TELEPHONE (OUTPATIENT)
Dept: UROLOGY | Facility: CLINIC | Age: 69
End: 2024-06-11
Payer: MEDICARE

## 2024-06-11 ENCOUNTER — HOSPITAL ENCOUNTER (OUTPATIENT)
Facility: HOSPITAL | Age: 69
Discharge: HOME | End: 2024-06-11
Attending: UROLOGY | Admitting: UROLOGY
Payer: MEDICARE

## 2024-06-11 ENCOUNTER — ANESTHESIA (OUTPATIENT)
Dept: SURGERY | Facility: HOSPITAL | Age: 69
End: 2024-06-11
Payer: MEDICARE

## 2024-06-11 VITALS
HEART RATE: 74 BPM | WEIGHT: 155.44 LBS | HEIGHT: 62 IN | DIASTOLIC BLOOD PRESSURE: 70 MMHG | SYSTOLIC BLOOD PRESSURE: 120 MMHG | OXYGEN SATURATION: 97 % | TEMPERATURE: 98 F | RESPIRATION RATE: 11 BRPM | BODY MASS INDEX: 28.61 KG/M2

## 2024-06-11 DIAGNOSIS — N39.41 URGE INCONTINENCE OF URINE: Primary | ICD-10-CM

## 2024-06-11 PROCEDURE — 71000033 HC RECOVERY, INTIAL HOUR: Mod: HCNC | Performed by: UROLOGY

## 2024-06-11 PROCEDURE — C1767 GENERATOR, NEURO NON-RECHARG: HCPCS | Mod: HCNC | Performed by: UROLOGY

## 2024-06-11 PROCEDURE — 64590 INS/RPL PRPH SAC/GSTR NPG/R: CPT | Mod: 51,,, | Performed by: UROLOGY

## 2024-06-11 PROCEDURE — 63600175 PHARM REV CODE 636 W HCPCS: Mod: JZ,JG,HCNC | Performed by: UROLOGY

## 2024-06-11 PROCEDURE — 25000003 PHARM REV CODE 250: Mod: HCNC

## 2024-06-11 PROCEDURE — C1787 PATIENT PROGR, NEUROSTIM: HCPCS | Mod: HCNC | Performed by: UROLOGY

## 2024-06-11 PROCEDURE — 25000003 PHARM REV CODE 250: Mod: HCNC | Performed by: ANESTHESIOLOGY

## 2024-06-11 PROCEDURE — 64561 IMPLANT NEUROELECTRODES: CPT | Mod: RT,,, | Performed by: UROLOGY

## 2024-06-11 PROCEDURE — 63600175 PHARM REV CODE 636 W HCPCS: Mod: HCNC | Performed by: ANESTHESIOLOGY

## 2024-06-11 PROCEDURE — 37000008 HC ANESTHESIA 1ST 15 MINUTES: Mod: HCNC | Performed by: UROLOGY

## 2024-06-11 PROCEDURE — 71000015 HC POSTOP RECOV 1ST HR: Mod: HCNC | Performed by: UROLOGY

## 2024-06-11 PROCEDURE — C1778 LEAD, NEUROSTIMULATOR: HCPCS | Mod: HCNC | Performed by: UROLOGY

## 2024-06-11 PROCEDURE — 37000009 HC ANESTHESIA EA ADD 15 MINS: Mod: HCNC | Performed by: UROLOGY

## 2024-06-11 PROCEDURE — 36000706: Mod: HCNC | Performed by: UROLOGY

## 2024-06-11 PROCEDURE — 63600175 PHARM REV CODE 636 W HCPCS: Mod: HCNC

## 2024-06-11 PROCEDURE — 36000707: Mod: HCNC | Performed by: UROLOGY

## 2024-06-11 DEVICE — LEAD INTERSTIM 2 SURESCAN 28CM: Type: IMPLANTABLE DEVICE | Site: BACK | Status: FUNCTIONAL

## 2024-06-11 DEVICE — SYS INTERSTIM X RECHARGE FREE: Type: IMPLANTABLE DEVICE | Site: BACK | Status: FUNCTIONAL

## 2024-06-11 RX ORDER — ROCURONIUM BROMIDE 10 MG/ML
INJECTION, SOLUTION INTRAVENOUS
Status: DISCONTINUED | OUTPATIENT
Start: 2024-06-11 | End: 2024-06-11

## 2024-06-11 RX ORDER — LEVOFLOXACIN 500 MG/1
500 TABLET, FILM COATED ORAL DAILY
Qty: 5 TABLET | Refills: 0 | Status: SHIPPED | OUTPATIENT
Start: 2024-06-11 | End: 2024-06-16

## 2024-06-11 RX ORDER — OXYCODONE AND ACETAMINOPHEN 5; 325 MG/1; MG/1
1 TABLET ORAL
Status: DISCONTINUED | OUTPATIENT
Start: 2024-06-11 | End: 2024-06-11 | Stop reason: HOSPADM

## 2024-06-11 RX ORDER — PROPOFOL 10 MG/ML
VIAL (ML) INTRAVENOUS
Status: DISCONTINUED | OUTPATIENT
Start: 2024-06-11 | End: 2024-06-11

## 2024-06-11 RX ORDER — LIDOCAINE HYDROCHLORIDE 20 MG/ML
INJECTION, SOLUTION EPIDURAL; INFILTRATION; INTRACAUDAL; PERINEURAL
Status: DISCONTINUED | OUTPATIENT
Start: 2024-06-11 | End: 2024-06-11

## 2024-06-11 RX ORDER — SUCCINYLCHOLINE CHLORIDE 20 MG/ML
INJECTION INTRAMUSCULAR; INTRAVENOUS
Status: DISCONTINUED | OUTPATIENT
Start: 2024-06-11 | End: 2024-06-11

## 2024-06-11 RX ORDER — ONDANSETRON HYDROCHLORIDE 2 MG/ML
INJECTION, SOLUTION INTRAMUSCULAR; INTRAVENOUS
Status: DISCONTINUED | OUTPATIENT
Start: 2024-06-11 | End: 2024-06-11

## 2024-06-11 RX ORDER — PROCHLORPERAZINE EDISYLATE 5 MG/ML
2.5 INJECTION INTRAMUSCULAR; INTRAVENOUS EVERY 6 HOURS PRN
Status: DISCONTINUED | OUTPATIENT
Start: 2024-06-11 | End: 2024-06-11 | Stop reason: HOSPADM

## 2024-06-11 RX ORDER — OXYCODONE AND ACETAMINOPHEN 5; 325 MG/1; MG/1
1 TABLET ORAL EVERY 4 HOURS PRN
Qty: 25 TABLET | Refills: 0 | Status: SHIPPED | OUTPATIENT
Start: 2024-06-11

## 2024-06-11 RX ORDER — FENTANYL CITRATE 50 UG/ML
INJECTION, SOLUTION INTRAMUSCULAR; INTRAVENOUS
Status: DISCONTINUED | OUTPATIENT
Start: 2024-06-11 | End: 2024-06-11

## 2024-06-11 RX ORDER — CLINDAMYCIN PHOSPHATE 900 MG/50ML
900 INJECTION, SOLUTION INTRAVENOUS
Status: COMPLETED | OUTPATIENT
Start: 2024-06-11 | End: 2024-06-11

## 2024-06-11 RX ORDER — HYDROMORPHONE HYDROCHLORIDE 2 MG/ML
0.2 INJECTION, SOLUTION INTRAMUSCULAR; INTRAVENOUS; SUBCUTANEOUS EVERY 5 MIN PRN
Status: DISCONTINUED | OUTPATIENT
Start: 2024-06-11 | End: 2024-06-11 | Stop reason: HOSPADM

## 2024-06-11 RX ORDER — MIDAZOLAM HYDROCHLORIDE 1 MG/ML
INJECTION INTRAMUSCULAR; INTRAVENOUS
Status: DISCONTINUED | OUTPATIENT
Start: 2024-06-11 | End: 2024-06-11

## 2024-06-11 RX ORDER — ONDANSETRON HYDROCHLORIDE 2 MG/ML
4 INJECTION, SOLUTION INTRAVENOUS DAILY PRN
Status: DISCONTINUED | OUTPATIENT
Start: 2024-06-11 | End: 2024-06-11 | Stop reason: HOSPADM

## 2024-06-11 RX ORDER — EPHEDRINE SULFATE 50 MG/ML
INJECTION, SOLUTION INTRAVENOUS
Status: DISCONTINUED | OUTPATIENT
Start: 2024-06-11 | End: 2024-06-11

## 2024-06-11 RX ORDER — PHENYLEPHRINE HYDROCHLORIDE 10 MG/ML
INJECTION INTRAVENOUS
Status: DISCONTINUED | OUTPATIENT
Start: 2024-06-11 | End: 2024-06-11

## 2024-06-11 RX ADMIN — OXYCODONE HYDROCHLORIDE AND ACETAMINOPHEN 1 TABLET: 5; 325 TABLET ORAL at 08:06

## 2024-06-11 RX ADMIN — SODIUM CHLORIDE, SODIUM LACTATE, POTASSIUM CHLORIDE, AND CALCIUM CHLORIDE: .6; .31; .03; .02 INJECTION, SOLUTION INTRAVENOUS at 06:06

## 2024-06-11 RX ADMIN — PROPOFOL 120 MG: 10 INJECTION, EMULSION INTRAVENOUS at 07:06

## 2024-06-11 RX ADMIN — PHENYLEPHRINE HYDROCHLORIDE 200 MCG: 10 INJECTION INTRAVENOUS at 07:06

## 2024-06-11 RX ADMIN — SUCCINYLCHOLINE CHLORIDE 160 MG: 20 INJECTION, SOLUTION INTRAMUSCULAR; INTRAVENOUS; PARENTERAL at 07:06

## 2024-06-11 RX ADMIN — LIDOCAINE HYDROCHLORIDE 100 MG: 20 INJECTION, SOLUTION EPIDURAL; INFILTRATION; INTRACAUDAL; PERINEURAL at 07:06

## 2024-06-11 RX ADMIN — SUGAMMADEX 200 MG: 100 INJECTION, SOLUTION INTRAVENOUS at 07:06

## 2024-06-11 RX ADMIN — ROCURONIUM BROMIDE 5 MG: 10 SOLUTION INTRAVENOUS at 07:06

## 2024-06-11 RX ADMIN — MIDAZOLAM HYDROCHLORIDE 2 MG: 1 INJECTION, SOLUTION INTRAMUSCULAR; INTRAVENOUS at 06:06

## 2024-06-11 RX ADMIN — EPHEDRINE SULFATE 15 MG: 50 INJECTION INTRAVENOUS at 07:06

## 2024-06-11 RX ADMIN — EPHEDRINE SULFATE 10 MG: 50 INJECTION INTRAVENOUS at 07:06

## 2024-06-11 RX ADMIN — ROCURONIUM BROMIDE 25 MG: 10 SOLUTION INTRAVENOUS at 07:06

## 2024-06-11 RX ADMIN — PHENYLEPHRINE HYDROCHLORIDE 100 MCG: 10 INJECTION INTRAVENOUS at 07:06

## 2024-06-11 RX ADMIN — HYDROMORPHONE HYDROCHLORIDE 0.2 MG: 2 INJECTION INTRAMUSCULAR; INTRAVENOUS; SUBCUTANEOUS at 08:06

## 2024-06-11 RX ADMIN — ONDANSETRON 4 MG: 2 INJECTION INTRAMUSCULAR; INTRAVENOUS at 06:06

## 2024-06-11 RX ADMIN — CLINDAMYCIN PHOSPHATE 900 MG: 900 INJECTION, SOLUTION INTRAVENOUS at 07:06

## 2024-06-11 RX ADMIN — PROCHLORPERAZINE EDISYLATE 2.5 MG: 5 INJECTION INTRAMUSCULAR; INTRAVENOUS at 08:06

## 2024-06-11 RX ADMIN — FENTANYL CITRATE 50 MCG: 0.05 INJECTION, SOLUTION INTRAMUSCULAR; INTRAVENOUS at 07:06

## 2024-06-11 NOTE — TRANSFER OF CARE
"Anesthesia Transfer of Care Note    Patient: Nadia Damon    Procedure(s) Performed: Procedure(s) (LRB):  INSERTION, NEUROSTIMULATOR, PERMANENT, SACRAL (N/A)    Patient location: PACU    Anesthesia Type: general    Transport from OR: Transported from OR on room air with adequate spontaneous ventilation    Post pain: adequate analgesia    Post assessment: no apparent anesthetic complications    Post vital signs: stable    Level of consciousness: awake    Nausea/Vomiting: no nausea/vomiting    Complications: none    Transfer of care protocol was followed      Last vitals: Visit Vitals  /67   Pulse 85   Temp 36.9 °C (98.4 °F) (Temporal)   Resp 16   Ht 5' 2" (1.575 m)   Wt 70.5 kg (155 lb 6.8 oz)   LMP 06/20/1983 (Within Years)   SpO2 98%   Breastfeeding No   BMI 28.43 kg/m²     "

## 2024-06-11 NOTE — OP NOTE
Date of Procedure: 06/11/2024      PREOPERATIVE DIAGNOSIS:  Urge incontinence     POSTOPERATIVE DIAGNOSIS:  Urge incontinence     PROCEDURES:  Stage I and 2 InterStim     SURGEON:  Sami Cochran M.D.     Assistants: None     Specimen:  None     ANESTHESIA:  General endotracheal.     BLOOD LOSS:  Minimal     FINDINGS:  0-3  both xavier and toe response, right lead placement come right device placement.     PROCEDURE IN DETAIL:  Patient was brought to the operating suite where they were placed under general anesthesia.  Patient was then positioned in the prone position, sterilely prepped and draped.  Approximately 11 cm superior to the coccyx, 2 cm lateral on both the right and left we placed needles into the S3 foramen.  This was confirmed clinically and through fluoroscopic imaging.  Right tested more appropriately, and in good position within the S3 foramen on both the AP and lateral positions.  We then placed our guide through the needle removing the needle.  The skin was incised to allow the dilator.  The trocar was introduced over the guide.  We then placed our lead in an inferior and lateral position.  0-3 all noted positive signs of both Xavier and toes.  Lead was in appropriate position in an inferior lateral location both on AP and lateral fluoroscopic images.  We then made approximately a 4 cm incision, 2 finger breaths below the right iliac crest.  The pocket was developed and hemostasis was meticulous maintained.  The lead was passed subcutaneously to the device site.  It was then connected to the device and assessed by the [employer] representative, once placed appropriately within the pocket.  It tested successful, we then began closure of the pocket.  Deep layer was closed with a running 3 0 Vicryl, skin with a 4 0 Monocryl in a subcuticular fashion.  Dermabond was applied to both sites, the patient was awakened and taken the PACU in stable condition.  They will be seen in 6 weeks for  evaluation.    COMPLICATIONS:  None

## 2024-06-11 NOTE — ANESTHESIA POSTPROCEDURE EVALUATION
Anesthesia Post Evaluation    Patient: Nadia Damon    Procedure(s) Performed: Procedure(s) (LRB):  INSERTION, NEUROSTIMULATOR, PERMANENT, SACRAL (N/A)    Final Anesthesia Type: general      Patient location during evaluation: PACU  Patient participation: Yes- Able to Participate  Level of consciousness: awake and alert  Post-procedure vital signs: reviewed and stable  Pain management: adequate  Airway patency: patent  DIALLO mitigation strategies: Verification of full reversal of neuromuscular block  PONV status at discharge: No PONV  Anesthetic complications: no      Cardiovascular status: hemodynamically stable  Respiratory status: spontaneous ventilation  Hydration status: euvolemic  Follow-up not needed.              Vitals Value Taken Time   /70 06/11/24 0900   Temp 36.7 °C (98 °F) 06/11/24 0900   Pulse 73 06/11/24 0901   Resp 11 06/11/24 0901   SpO2 93 % 06/11/24 0900   Vitals shown include unfiled device data.      Event Time   Out of Recovery 09:05:04         Pain/Marixa Score: Pain Rating Prior to Med Admin: 5 (6/11/2024  8:57 AM)  Marixa Score: 10 (6/11/2024  9:02 AM)

## 2024-06-11 NOTE — ANESTHESIA PROCEDURE NOTES
Intubation    Date/Time: 6/11/2024 7:01 AM    Performed by: David Schneider  Authorized by: Huseyin Parada II, MD    Intubation:     Induction:  Rapid sequence induction    Intubated:  Postinduction    Mask Ventilation:  Not attempted (RSI)    Attempts:  1    Attempted By:  Student    Method of Intubation:  Video laryngoscopy    Blade:  Nettles 3    Laryngeal View Grade: Grade I - full view of cords      Difficult Airway Encountered?: No      Complications:  None    Airway Device:  Oral endotracheal tube    Airway Device Size:  7.0    Style/Cuff Inflation:  Cuffed (inflated to minimal occlusive pressure)    Tube secured:  22    Secured at:  The lips    Placement Verified By:  Capnometry    Complicating Factors:  Poor neck/head extension    Findings Post-Intubation:  BS equal bilateral and atraumatic/condition of teeth unchanged

## 2024-06-11 NOTE — TRANSFER OF CARE
"Anesthesia Transfer of Care Note    Patient: Nadia Damon    Procedure(s) Performed: Procedure(s) (LRB):  INSERTION, NEUROSTIMULATOR, PERMANENT, SACRAL (N/A)    Patient location: PACU    Anesthesia Type: general    Transport from OR: Transported from OR on room air with adequate spontaneous ventilation    Post pain: adequate analgesia    Post assessment: no apparent anesthetic complications    Post vital signs: stable    Level of consciousness: sedated    Nausea/Vomiting: no nausea/vomiting    Complications: none    Transfer of care protocol was followed      Last vitals: Visit Vitals  /67   Pulse 85   Temp 36.9 °C (98.4 °F) (Temporal)   Resp 16   Ht 5' 2" (1.575 m)   Wt 70.5 kg (155 lb 6.8 oz)   LMP 06/20/1983 (Within Years)   SpO2 98%   Breastfeeding No   BMI 28.43 kg/m²     "

## 2024-06-11 NOTE — DISCHARGE SUMMARY
[facility] (Hospital)  Discharge Note  Short Stay    Procedure(s) (LRB):  INSERTION, NEUROSTIMULATOR, PERMANENT, SACRAL (N/A)      OUTCOME: Patient tolerated treatment/procedure well without complication and is now ready for discharge.    DISPOSITION: Home or Self Care    FINAL DIAGNOSIS:  urge incontinence    FOLLOWUP: In clinic    DISCHARGE INSTRUCTIONS:    Discharge Procedure Orders   Diet Adult Regular     Lifting restrictions   Order Comments: Nothing over 15 pounds for one month     Notify your health care provider if you experience any of the following:  redness, tenderness, or signs of infection (pain, swelling, redness, odor or green/yellow discharge around incision site)     Notify your health care provider if you experience any of the following:  severe uncontrolled pain     Notify your health care provider if you experience any of the following:  persistent nausea and vomiting or diarrhea     Notify your health care provider if you experience any of the following:  temperature >100.4     Shower on day dressing removed (No bath)   Order Comments: May shower 36 hours postoperatively, no submersion of wound for 1 month.        TIME SPENT ON DISCHARGE: 5 minutes

## 2024-06-13 ENCOUNTER — TELEPHONE (OUTPATIENT)
Dept: HEMATOLOGY/ONCOLOGY | Facility: CLINIC | Age: 69
End: 2024-06-13
Payer: MEDICARE

## 2024-06-13 DIAGNOSIS — K21.9 GASTROESOPHAGEAL REFLUX DISEASE, UNSPECIFIED WHETHER ESOPHAGITIS PRESENT: ICD-10-CM

## 2024-06-13 RX ORDER — PANTOPRAZOLE SODIUM 20 MG/1
20 TABLET, DELAYED RELEASE ORAL
Qty: 90 TABLET | Refills: 3 | Status: SHIPPED | OUTPATIENT
Start: 2024-06-13

## 2024-06-13 RX ORDER — CALCITONIN SALMON 200 [IU]/.09ML
SPRAY, METERED NASAL
Qty: 3 EACH | Refills: 3 | Status: SHIPPED | OUTPATIENT
Start: 2024-06-13

## 2024-06-13 NOTE — TELEPHONE ENCOUNTER
----- Message from Kathyxin Calderon sent at 6/13/2024  1:31 PM CDT -----  States she surgery on her hip into her bladder on Tuesday, 6/11. States she would like to know, if she still needs to come in on Monday, 6/17 for her labs and injection. Please call pt [phone]. Thank you

## 2024-06-19 ENCOUNTER — OFFICE VISIT (OUTPATIENT)
Dept: PRIMARY CARE CLINIC | Facility: CLINIC | Age: 69
End: 2024-06-19
Payer: MEDICARE

## 2024-06-19 DIAGNOSIS — I50.32 CHRONIC DIASTOLIC (CONGESTIVE) HEART FAILURE: Chronic | ICD-10-CM

## 2024-06-19 DIAGNOSIS — I27.9 PULMONARY HEART DISEASE: Chronic | ICD-10-CM

## 2024-06-19 DIAGNOSIS — Z09 HOSPITAL DISCHARGE FOLLOW-UP: Primary | ICD-10-CM

## 2024-06-19 DIAGNOSIS — M25.552 BILATERAL HIP PAIN: ICD-10-CM

## 2024-06-19 DIAGNOSIS — M25.551 BILATERAL HIP PAIN: ICD-10-CM

## 2024-06-19 PROCEDURE — 99499 UNLISTED E&M SERVICE: CPT | Mod: HCNC,95,, | Performed by: INTERNAL MEDICINE

## 2024-06-19 NOTE — PROGRESS NOTES
Established Patient - Audio Only Telehealth Visit     The patient location is: Home  The chief complaint leading to consultation is: Hospital f/u  Visit type: Virtual visit with audio only (telephone)  Total time spent with patient: 30 minutes    The reason for the audio only service rather than synchronous audio and video virtual visit was related to technical difficulties or patient preference/necessity.     Each patient to whom I provide medical services by telemedicine is:  (1) informed of the relationship between the physician and patient and the respective role of any other health care provider with respect to management of the patient; and (2) notified that they may decline to receive medical services by telemedicine and may withdraw from such care at any time. Patient verbally consented to receive this service via voice-only telephone call.    Nadia Damon  06/19/2024  [id number]    Elis Wick MD  Patient Care Team:  Elis Wick MD as PCP - General (Internal Medicine)  Miguel Soni Jr., MD as Consulting Physician (Vascular Surgery)  Ike King MD as Consulting Physician (Cardiology)  Courtney Tubbs MD as Consulting Physician (Cardiology)  Carter Crawford MD as Consulting Physician (Nephrology)  Paxton Vasques OD as Consulting Physician (Optometry)  PRANAY Villalobos MD as Obstetrician (Obstetrics)  Parker Mccarthy IV, MD (Urology)  Ike King MD as Consulting Physician (Cardiology)  Jose Roland MD as Consulting Physician (Dermatology)  Prasanth Johnson MD as Consulting Physician (Rheumatology)  Elis Wick MD as Physician (Internal Medicine)  Lexi Bianchi LCSW as  (Hematology and Oncology)  Candace Saleh LMSW as  (Hematology and Oncology)  Kelsie Burk PA-C as Physician Assistant (Hematology and Oncology)    Chief Complaint:  No chief complaint on file.    History of Present Illness: Ms. Nelson silva h/o heart transplant  1993, on anti-rejection medication. 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 She was treated with 1 cycle of systemic chemotherapy cytoxan and taxotere and udenyca that was discontinued due to toxicity. She has been followed by radiation oncology and completed last radiation treatment 5/14/22. She is still followed by Heme/Onc for Epo, initiated for anemia due to stage 4 CKD.       Other medical issues include depresssion/anxiety/insomnia, hypertension, h/o chronic diastolic CHF, mild Pulm HTN and osteoporois for which she is receiving Prolia.     Interstim placed 6/11/24  Still sore over R hip at site of Interstim insertion and from fall against dresser 6/13  Taking acetaminophen 500 mg 2x daily    PHQ-4 Score: 0     From LOV w me 5/13/24   Has been trying to limit sodium and phosphorus - more kidney friendly food and drink  Cont urinary frequency - evaluation w Urology Dr. Cochran for possible interstim procedure this summer  Meanwhile, Q2 hr voiding is helping    Hosp/ED/Urgent Care:  6/11/24 [facility] interstim    Recent appointments:   5/28/24 Nephro K[facility]  5/17/24 Urology [facility]    Upcoming appointments:  Future Appointments       Next 10 Appointments       Date Provider Specialty Appt Notes    6/24/2024 Samantha Hernandez MD Primary Care F/u    7/2/2024  Lab FASTING labs, Cyclosporine level    7/2/2024  Chemotherapy DL//retacrit monthy with cbc/ow/ngwv  (f/u with lab in aug Kelsie)/ngwv    7/2/2024  Radiology .    7/2/2024 Courtney Tubbs MD Transplant post heart    7/3/2024  Radiology     7/11/2024 Prasanth Johnson MD Rheumatology stephanie/bc    7/12/2024 Sami Cochran MD Urology 4 weeks    8/19/2024 Elis Wick MD Primary Care f/u The earliest pt can make to to appt due to having back sx in [place]    10/17/2024 Kristine Delgado, NP Breast Surgery exam only              Displaying the next 10  appointments. This patient has additional appointments scheduled.             The following were reviewed: Active problem list, medication list, allergies, family history, social history, and Health Maintenance.     History:  Past Medical History:   Diagnosis Date    Abdominal wall hernia     CT Renal 6/11/2018---Small fat containing superior ventral abdominal wall hernia at the epicardial pacing lead site.    Abnormal mammogram 10/12/2021    Acute cystitis without hematuria 05/10/2022    GRACE (acute kidney injury) 11/22/2021    Anxiety     Arthritis     ZEN HIPS    Breast cancer in female 08/2021    LEFT BREAST    Bronchitis 08/18/2016    Never smoked      C. difficile colitis 11/29/2021    Cellulitis of axilla, left 12/23/2021    Chronic diastolic heart failure 12/16/2021    Chronic kidney disease     stage 4, GFR 15-29 ml/min    Chronic midline low back pain without sciatica 06/18/2018    Closed nondisplaced fracture of distal phalanx of left great toe with routine healing 10/22/2018    Coronary artery disease 1993    heart transplant    COVID-19 in immunocompromised patient 02/26/2024    Cystitis 05/10/2022    Depression     Encounter for blood transfusion     Fibromyalgia     on Lyrica    Heart failure     native heart cardiomyopathy    Heart transplanted 1993    due to cardiomyopathy    History of hyperparathyroidism; Hyperparathyroidism, secondary renal     PT DENIES    Hypertension     Immune disorder     anti rejection meds    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    Impaired mobility 07/28/2022    Iron deficiency anemia 08/15/2017    Kidney stones     passed per pt    Obesity     Obesity (BMI 30.0-34.9) 07/22/2019    Other osteoporosis without current pathological fracture 08/30/2019    Other pancytopenia 05/06/2024    Parotitis, acute 09/19/2023    Pharyngitis 01/09/2019    Pneumonia due to infectious organism 03/14/2024    PONV (postoperative nausea and vomiting)     Severe sepsis  11/22/2021    Shingles 2003 approx    left leg    Subclinical hypothyroidism 06/16/2023    Thrombocytopenia, unspecified 11/29/2021    Trouble in sleeping     Urinary incontinence      Patient Active Problem List   Diagnosis    Heart transplanted    Primary insomnia    CKD (chronic kidney disease) stage 4, GFR 15-29 ml/min    Gastroesophageal reflux disease without esophagitis    Essential hypertension    Aortic atherosclerosis    Secondary hyperparathyroidism, renal    Hyperkalemia    Other osteoporosis without current pathological fracture    Immunocompromised patient    Ductal carcinoma in situ (DCIS) of left breast    Ulnar neuropathy of left upper extremity    Chronic diastolic (congestive) heart failure    Anemia associated with stage 4 chronic renal failure    Secondary and unspecified malignant neoplasm of axilla and upper limb lymph nodes    Borderline abnormal TFTs    Other hyperlipidemia    DDD (degenerative disc disease), thoracolumbar    Ventral hernia without obstruction or gangrene    Acquired hypothyroidism    FRANKY (stress urinary incontinence, female)    Prolapse of vaginal cuff after hysterectomy    Pulmonary heart disease    Urge incontinence of urine    Orthopedic device, implant, or graft complication    Hospital discharge follow-up    Bilateral hip pain     Review of patient's allergies indicates:   Allergen Reactions    Lisinopril Swelling and Rash    Augmentin [amoxicillin-pot clavulanate] Diarrhea    Zyvox [linezolid] Nausea And Vomiting       Medications:  Current Outpatient Medications on File Prior to Visit   Medication Sig Dispense Refill    albuterol (VENTOLIN HFA) 90 mcg/actuation inhaler Inhale 2 puffs into the lungs every 6 (six) hours as needed for Wheezing or Shortness of Breath. Rescue 18 g 1    biotin 10,000 mcg Cap Take 1 tablet by mouth once daily. (Patient not taking: Reported on 6/6/2024)      calcitonin, salmon, (FORTICAL) 200 unit/actuation nasal spray USE 1 SPRAY DAILY IN  ALTERNATE NOSTRIL 3 each 3    carvediloL (COREG) 6.25 MG tablet Take 1 tablet (6.25 mg total) by mouth 2 (two) times daily with meals. 180 tablet 3    cycloSPORINE modified, NEORAL, (NEORAL) 25 MG capsule Take 3 capsules (75 mg total) by mouth 2 (two) times daily. 540 capsule 1    ergocalciferol (VITAMIN D2) 50,000 unit Cap Take 1 capsule (50,000 Units total) by mouth every 14 (fourteen) days. 6 capsule 3    EVENING PRIMROSE OIL ORAL Take 1,000 mg by mouth once daily.      fluticasone propionate (FLONASE) 50 mcg/actuation nasal spray 2 sprays (100 mcg total) by Each Nostril route once daily. (Patient not taking: Reported on 6/6/2024) 16 g 1    furosemide (LASIX) 20 MG tablet TAKE 1 TABLET EVERY DAY 90 tablet 3    hydrALAZINE (APRESOLINE) 50 MG tablet Take 1 tablet (50 mg total) by mouth every 8 (eight) hours. 270 tablet 3    hydrocortisone 1 % cream APPLY TOPICALLY 2 (TWO) TIMES DAILY. 35 g 3    LIDOcaine (LIDODERM) 5 % PLACE 1 PATCH ONTO THE SKIN DAILY AS NEEDED (BACK PAIN). REMOVE AND DISCARD PATCH WITHIN 12 HOURS OR AS DIRECTED BY MD 60 patch 5    multivitamin capsule Take 1 capsule by mouth once daily.      NIFEdipine (PROCARDIA-XL) 30 MG (OSM) 24 hr tablet TAKE 1 TABLET ONE TIME DAILY (Patient taking differently: Take 30 mg by mouth once daily.) 90 tablet 3    oxyCODONE-acetaminophen (PERCOCET) 5-325 mg per tablet Take 1 tablet by mouth every 4 (four) hours as needed for Pain. 25 tablet 0    pantoprazole (PROTONIX) 20 MG tablet TAKE 1 TABLET ONE TIME DAILY 90 tablet 3    patiromer calcium sorbitex (VELTASSA) 8.4 gram PwPk Take 1 packet (8.4 g total) by mouth every other day. 15 packet 3    polyethylene glycol (GLYCOLAX) 17 gram PwPk Take 17 g by mouth once daily.      pregabalin (LYRICA) 50 MG capsule Take 1 capsule (50 mg total) by mouth 2 (two) times daily. 180 capsule 0    psyllium husk (METAMUCIL ORAL) Take 17 g by mouth once daily.      RETACRIT 20,000 unit/mL injection       temazepam (RESTORIL) 30 mg  capsule Take 1 capsule (30 mg total) by mouth nightly as needed for Insomnia. 90 capsule 0    UNABLE TO FIND medication name: Stool softener (Ducolax) Laxative      vitamin E 400 UNIT capsule Take 400 Units by mouth once daily.      zolpidem (AMBIEN) 5 MG Tab Take 1 tablet (5 mg total) by mouth nightly as needed (insomnia). 90 tablet 0     Current Facility-Administered Medications on File Prior to Visit   Medication Dose Route Frequency Provider Last Rate Last Admin    acetaminophen tablet 1,000 mg  1,000 mg Oral On Call Procedure PRANAY Villalobos MD         Medications have been reviewed and reconciled with patient at visit today.    Review of Systems   Constitutional:  Positive for activity change. Negative for fever.   HENT:  Negative for nasal congestion, sinus pressure/congestion, sore throat and trouble swallowing.    Eyes:  Negative for pain and visual disturbance.   Respiratory:  Negative for cough, chest tightness, shortness of breath and wheezing.    Cardiovascular:  Positive for leg swelling. Negative for chest pain and palpitations.   Gastrointestinal:  Negative for abdominal pain, constipation, diarrhea, nausea and vomiting.   Genitourinary:  Positive for bladder incontinence and nocturia. Negative for dysuria.   Musculoskeletal:  Positive for arthralgias and gait problem.        B hip pain   Integumentary:  Positive for wound. Negative for rash.        R hip surgical site  Itchy mid upper back   Neurological:  Negative for dizziness and light-headedness.   Psychiatric/Behavioral:  Positive for sleep disturbance.     And see HPI above    Exam:  BP Readings from Last 3 Encounters:   06/11/24 120/70   05/28/24 (!) 160/88   05/28/24 (!) 160/88        Wt Readings from Last 3 Encounters:   06/11/24 0634 70.5 kg (155 lb 6.8 oz)   05/28/24 1500 70.8 kg (156 lb)   05/28/24 1359 70.8 kg (156 lb)      Laboratory Reviewed  Lab Results   Component Value Date    WBC 4.15 05/20/2024    HGB 9.2 (L) 05/20/2024     HCT 28.9 (L) 05/20/2024     05/20/2024    MCV 90 05/20/2024    CHOL 153 05/20/2024    TRIG 145 05/20/2024    HDL 37 (L) 05/20/2024    LDLCALC 87.0 05/20/2024    ALT 21 04/05/2024    AST 31 04/05/2024     05/20/2024    K 3.9 05/20/2024     05/20/2024    CREATININE 2.6 (H) 05/20/2024    BUN 42 (H) 05/20/2024    CO2 20 (L) 05/20/2024    MG 1.8 06/20/2023    TSH 8.112 (H) 05/01/2024    FREET4 0.97 05/01/2024    PSA <0.1 05/27/2008    INR 1.0 11/22/2021    HGBA1C 5.1 03/08/2023    CRP 14.4 (H) 08/24/2023     Lab Results   Component Value Date    .2 (H) 05/20/2024    CALCIUM 8.9 05/20/2024    CAION 1.13 09/05/2018    PHOS 3.8 05/20/2024      Lab Results   Component Value Date    WEDTXYXW67 1373 (H) 06/20/2023     Lab Results   Component Value Date    FOLATE 20.0 06/20/2023      Lab Results   Component Value Date    IRON 67 04/05/2024    TRANSFERRIN 187 (L) 04/05/2024    TIBC 277 04/05/2024    FESATURATED 24 04/05/2024      Lab Results   Component Value Date    EGFRNORACEVR 19 (A) 05/20/2024    ALBUMIN 3.3 (L) 05/20/2024     (H) 12/27/2023     Lab Results   Component Value Date    OMQTWPVW22NS 45 05/01/2024        Assessment:   68 y.o. female with multiple co-morbid illnesses here for continued follow up of medical problems.      The primary encounter diagnosis was Hospital discharge follow-up. Diagnoses of Pulmonary heart disease, Chronic diastolic (congestive) heart failure, and Bilateral hip pain were also pertinent to this visit.      Plan:   1. Hospital discharge follow-up  Overview:  Interstim placed 6/11/24    Assessment & Plan:  Pt reports concern last week w increased pain following fall and limited mobility but feeling better now - f/u Urology post-op as scheduled - will see if can get earlier appt here w me also       2. Pulmonary heart disease  Overview:  Echocardiogram 5/28/24   The estimated pulmonary artery systolic pressure is 53 mmHg.    Left Ventricle: The left ventricle  is normal in size. Normal wall thickness. There is mild asymmetric hypertrophy. Normal wall motion. Septal flattening in systole consistent with right ventricular pressure overload. There is normal systolic function with a visually estimated ejection fraction of 55 - 60%. There is diastolic dysfunction but grade cannot be determined.    Right Ventricle: Moderate right ventricular enlargement. Wall thickness is normal. Systolic function is normal.    Left Atrium: Left atrium is severely dilated.    Right Atrium: Right atrium is moderately dilated.    Tricuspid Valve: There is moderate regurgitation.    IVC/SVC: Intermediate venous pressure at 8 mmHg.      Assessment & Plan:  Denies SOB at rest - does have DONOHUE - cont current Rx and f/u cardiology and Transplant as scheduled      3. Chronic diastolic (congestive) heart failure  Overview:  Echocardiogram 5/28/24   The estimated pulmonary artery systolic pressure is 53 mmHg.   Left Ventricle: The left ventricle is normal in size. Normal wall thickness. There is mild asymmetric hypertrophy. Normal wall motion. Septal flattening in systole consistent with right ventricular pressure overload. There is normal systolic function with a visually estimated ejection fraction of 55 - 60%. There is diastolic dysfunction but grade cannot be determined.    Assessment & Plan:  Denies SOB at rest - does have DONOHUE and chronic LE edema - cont current Rx and f/u cardiology and Transplant as scheduled      4. Bilateral hip pain  Assessment & Plan:  S/p recent interstim insertion R hip and has been putting more pressure on L hip - advised to stay mobile and active and to use rollator walker for now - advised can take acetaminophen 500 mg 4x daily            Health Maintenance         Date Due Completion Date    Annual UACr Never done ---    RSV Vaccine (Age 60+ and Pregnant patients) (1 - 1-dose 60+ series) Never done ---    COVID-19 Vaccine (7 - 2023-24 season) 09/01/2023 4/27/2023     Mammogram 04/18/2025 4/18/2024    Lipid Panel 05/20/2025 5/20/2024    DEXA Scan 01/25/2026 1/25/2023    Colorectal Cancer Screening 02/25/2026 2/25/2021    Hemoglobin A1c (Diabetic Prevention Screening) 03/08/2026 3/8/2023    TETANUS VACCINE 09/09/2030 9/9/2020          -Patient's lab results were reviewed and discussed with patient  -Treatment options and alternatives were discussed with the patient. Patient expressed understanding. Patient was given the opportunity to ask questions and be an active participant in their medical care. Patient had no further questions or concerns at this time.   -Patient is an overall moderate to HIGH risk for health complications from their medical conditions.     Follow up: Follow up in about 4 weeks (around 7/17/2024) for Follow Up 1st avail w me.    After visit summary printed and given to patient upon discharge.  Patient goals and care plan are included in After visit summary.    TOTAL TIME evaluating and managing this patient for this encounter was 50 minutes. This time was spent personally by me on some of the following activities: review of patient's past medical history, assessing age-appropriate health maintenance needs, review of any interval history, review and interpretation of lab results, review and interpretation of imaging test results, review and interpretation of cardiology test results, reviewing consulting specialist notes, obtaining history from the patient and family, examination of the patient, medication reconciliation, managing and/or ordering prescription medications, ordering imaging tests, ordering referral to subspecialty provider(s), educating patient and answering their questions about diagnosis, treatment plan, and goals of treatment, discussing planned follow-up and final documentation of the visit. This time was exclusive of any separately billable procedures for this patient and exclusive of time spent treating any other patients.                     This service was not originating from a related E/M service provided within the previous 7 days nor will  to an E/M service or procedure within the next 24 hours or my soonest available appointment.  Prevailing standard of care was able to be met in this audio-only visit.

## 2024-06-19 NOTE — ASSESSMENT & PLAN NOTE
Pt reports concern last week w increased pain following fall and limited mobility but feeling better now - f/u Urology post-op as scheduled - will see if can get earlier appt here w me also    normal...

## 2024-06-19 NOTE — PATIENT INSTRUCTIONS
If you are feeling unwell, we'd like to be the first ones to know here at [facility]! Please give us a call. Same day appointments are our top priority to keep you well and out of the emergency rooms and hospitals. Call [phone] for our direct line. After hours advice is always available. Please call [phone] after hours to speak to the on-call team.     Principal Discharge DX:	Status asthmaticus  Goal:	wean albuterol, project breathe  Assessment and plan of treatment:	- follow up with PMD on Tuesday as scheduled  - PMD to refer to pulmonlogy  - take medications as prescribed, follow asthma action plan Principal Discharge DX:	Status asthmaticus  Goal:	wean albuterol, project breathe  Assessment and plan of treatment:	- Please follow up with your pediatrician within 1-2 days. If they are not available, please follow up with urgent care center.  - PMD to refer to pulmonlogy  - Take medications as prescribed, follow asthma action plan Principal Discharge DX:	Status asthmaticus  Goal:	wean albuterol, project breathe  Assessment and plan of treatment:	- Please follow up with your pediatrician tomorrow at 1pm. If you miss your appointment, please follow up with urgent care center.  - PMD to refer to pulmonlogy  - Take medications as prescribed, follow asthma action plan Principal Discharge DX:	Status asthmaticus  Goal:	wean albuterol, project breathe  Assessment and plan of treatment:	- Please continue to take Albuterol every 4 hours until you follow up with your pediatrician tomorrow at 1pm. If you miss your appointment, please follow up with urgent care center.  - PMD to refer to pulmonology  - Take medications as prescribed, follow asthma action plan

## 2024-06-19 NOTE — ASSESSMENT & PLAN NOTE
Denies SOB at rest - does have DONOHUE - cont current Rx and f/u cardiology and Transplant as scheduled

## 2024-06-19 NOTE — ASSESSMENT & PLAN NOTE
S/p recent interstim insertion R hip and has been putting more pressure on L hip - advised to stay mobile and active and to use rollator walker for now - advised can take acetaminophen 500 mg 4x daily

## 2024-06-19 NOTE — ASSESSMENT & PLAN NOTE
Denies SOB at rest - does have DONOHUE and chronic LE edema - cont current Rx and f/u cardiology and Transplant as scheduled

## 2024-06-20 ENCOUNTER — TELEPHONE (OUTPATIENT)
Dept: PRIMARY CARE CLINIC | Facility: CLINIC | Age: 69
End: 2024-06-20
Payer: MEDICARE

## 2024-06-20 NOTE — TELEPHONE ENCOUNTER
Pt states she forgot to tell you about a few things on yesterday    States that she had slight headache last night- using saline nasal spray    Right foot swollen - advised pt to elevate foot    Just wanted you to be aware

## 2024-06-24 ENCOUNTER — E-CONSULT (OUTPATIENT)
Dept: UROLOGY | Facility: CLINIC | Age: 69
End: 2024-06-24
Payer: MEDICARE

## 2024-06-24 ENCOUNTER — TELEPHONE (OUTPATIENT)
Dept: PRIMARY CARE CLINIC | Facility: CLINIC | Age: 69
End: 2024-06-24
Payer: MEDICARE

## 2024-06-24 ENCOUNTER — OFFICE VISIT (OUTPATIENT)
Dept: PRIMARY CARE CLINIC | Facility: CLINIC | Age: 69
End: 2024-06-24
Payer: MEDICARE

## 2024-06-24 VITALS
TEMPERATURE: 98 F | WEIGHT: 153.25 LBS | DIASTOLIC BLOOD PRESSURE: 70 MMHG | SYSTOLIC BLOOD PRESSURE: 122 MMHG | BODY MASS INDEX: 28.2 KG/M2 | OXYGEN SATURATION: 98 % | HEART RATE: 87 BPM | HEIGHT: 62 IN

## 2024-06-24 DIAGNOSIS — N39.41 URGE INCONTINENCE OF URINE: Primary | ICD-10-CM

## 2024-06-24 DIAGNOSIS — M25.551 ACUTE RIGHT HIP PAIN: Primary | ICD-10-CM

## 2024-06-24 PROCEDURE — 99499 UNLISTED E&M SERVICE: CPT | Mod: S$GLB,,, | Performed by: STUDENT IN AN ORGANIZED HEALTH CARE EDUCATION/TRAINING PROGRAM

## 2024-06-24 PROCEDURE — 1160F RVW MEDS BY RX/DR IN RCRD: CPT | Mod: HCNC,CPTII,S$GLB, | Performed by: FAMILY MEDICINE

## 2024-06-24 PROCEDURE — 3008F BODY MASS INDEX DOCD: CPT | Mod: HCNC,CPTII,S$GLB, | Performed by: FAMILY MEDICINE

## 2024-06-24 PROCEDURE — 1157F ADVNC CARE PLAN IN RCRD: CPT | Mod: HCNC,CPTII,S$GLB, | Performed by: FAMILY MEDICINE

## 2024-06-24 PROCEDURE — 1101F PT FALLS ASSESS-DOCD LE1/YR: CPT | Mod: HCNC,CPTII,S$GLB, | Performed by: FAMILY MEDICINE

## 2024-06-24 PROCEDURE — 3066F NEPHROPATHY DOC TX: CPT | Mod: HCNC,CPTII,S$GLB, | Performed by: FAMILY MEDICINE

## 2024-06-24 PROCEDURE — 1125F AMNT PAIN NOTED PAIN PRSNT: CPT | Mod: HCNC,CPTII,S$GLB, | Performed by: FAMILY MEDICINE

## 2024-06-24 PROCEDURE — 3074F SYST BP LT 130 MM HG: CPT | Mod: HCNC,CPTII,S$GLB, | Performed by: FAMILY MEDICINE

## 2024-06-24 PROCEDURE — 99214 OFFICE O/P EST MOD 30 MIN: CPT | Mod: HCNC,S$GLB,, | Performed by: FAMILY MEDICINE

## 2024-06-24 PROCEDURE — 99999 PR PBB SHADOW E&M-EST. PATIENT-LVL V: CPT | Mod: PBBFAC,HCNC,, | Performed by: FAMILY MEDICINE

## 2024-06-24 PROCEDURE — 3078F DIAST BP <80 MM HG: CPT | Mod: HCNC,CPTII,S$GLB, | Performed by: FAMILY MEDICINE

## 2024-06-24 PROCEDURE — 3288F FALL RISK ASSESSMENT DOCD: CPT | Mod: HCNC,CPTII,S$GLB, | Performed by: FAMILY MEDICINE

## 2024-06-24 PROCEDURE — 1159F MED LIST DOCD IN RCRD: CPT | Mod: HCNC,CPTII,S$GLB, | Performed by: FAMILY MEDICINE

## 2024-06-24 NOTE — PATIENT INSTRUCTIONS
Please hydrate as usual   Continue pain medicines as need as you have been doing   Virtual follow up this week w pcp    If you are feeling unwell, we'd like to [facility] the [facility] ones to [facility] Please give us a call. Same day appointments are our top priority to keep you well and out of the emergency rooms and hospitals. Call [phone] for our direct line. After hours advice is always available. Please call [phone] after hours to speak to the on-call team.

## 2024-06-24 NOTE — CONSULTS
[facility]  Response for E-Consult     Patient Name: Nadia Damon  MRN: [id number]  Primary Care Provider: Elis Wick MD   Requesting Provider: Samantha Hernandez MD  E-Consult to Urology  Consult performed by: Ty Campa MD  Consult ordered by: Samantha Hernandez MD          Unfortunately, this eConsult has been declined due to: Established Patient    Established Patient:  This eConsult submission cannot be completed at this time. This is an established patient in our specialty and we are currently managing the problem for which you have sent a question about. The eConsult tool is not meant to be used for problems which are currently being managed by a specialist. In order to further assist you, please contact our specialty through the usual channels.    Patient is established with Dr. Manuel and had a recent procedure with him. Please reach out to him directly.     Thank you for this eConsult referral.     Ty Campa MD  [facility]

## 2024-06-24 NOTE — PROGRESS NOTES
Patient Care Team:  Elis Wick MD as PCP - General (Internal Medicine)  Miguel Soni Jr., MD as Consulting Physician (Vascular Surgery)  Ike King MD as Consulting Physician (Cardiology)  Courtney Tubbs MD as Consulting Physician (Cardiology)  Carter Crawford MD as Consulting Physician (Nephrology)  Paxton Vasques OD as Consulting Physician (Optometry)  PRANAY Villalobos MD as Obstetrician (Obstetrics)  Parker Mccarthy IV, MD (Urology)  Ike King MD as Consulting Physician (Cardiology)  Jose Roland MD as Consulting Physician (Dermatology)  Prasanth Johnson MD as Consulting Physician (Rheumatology)  Elis Wick MD as Physician (Internal Medicine)  Lexi Bianchi LCSW as  (Hematology and Oncology)  Candace Saleh LMSW as  (Hematology and Oncology)  Kelsie Burk PA-C as Physician Assistant (Hematology and Oncology)  Elis Wick MD  Future Appointments   Date Time Provider Department Center   6/26/2024  9:20 AM Elis Wick MD [facility]   7/2/2024  8:40 AM LABORATORY, [facility] INDRA [facility] LAB [facility]   7/2/2024  9:15 AM INJECTION 1, [facility] INFUSION [facility] IN[facility]N [facility]   7/2/2024  9:45 AM ON[facility] XR1-DR ONL XRAY [facility]   7/2/2024 11:30 AM Courtney Tubbs MD ONLC HTFLNTX [facility]   7/3/2024  1:30 PM ONLC BMD1 [facility] DEXABMD [facility]   7/11/2024 12:30 PM Prasanth Johnson MD [facility] RHEU [facility]   7/12/2024 10:45 AM Sami Cochran MD ON[facility] UROLOGY [facility]   8/19/2024  3:00 PM Elis Wick MD [facility]   10/17/2024  1:00 PM Kristine Delgado, ONELIA BRCC BRESUR [facility]   10/23/2024 10:30 AM LABORATORY, [facility]   10/29/2024  1:30 PM Carter Crawford MD [facility] NEPHRO [facility]       Subjective:      Patient ID: Nadia Damon is a 68 y.o. female.    Chief Complaint: Follow-up (Bladder Implant  on 06/11/24)      HPI  New problem- right side pain  Neurotransmitter device implanted in right hip  "for bladder control 6/11/24.  Fell on postop day 3 hit right side on a dresser. Increases pain since, extends proximally and distally, changing positions exacerbates the pain. Taking half of prescribed pain pill as needed. Took a tylenol this am. None yesterday. Using lidocaine patch  Pt left message with urology no one called back yet. Follow up is 7/12. Called here, pcp told her to come in    Review of Systems  PHQ-4 Score: 0   Last worry score 3    PMHx, active ongoing problems, labs and medications reviewed    Objective:   /70 (BP Location: Left arm, Patient Position: Sitting)   Pulse 87   Temp 97.6 °F (36.4 °C) (Temporal)   Ht 5' 2" (1.575 m)   Wt 69.5 kg (153 lb 3.5 oz)   LMP 06/20/1983 (Within Years)   SpO2 98%   BMI 28.02 kg/m²     Physical Exam  Vitals and nursing note reviewed.   Constitutional:       General: She is in acute distress.      Appearance: She is ill-appearing. She is not toxic-appearing.   Cardiovascular:      Rate and Rhythm: Normal rate.   Pulmonary:      Effort: Pulmonary effort is normal.   Musculoskeletal:      Comments: Right hip surgical site c/d/I. Patient in guarded position, sitting on left buttock, rises walks slowly to avoid pain. Lidocaine patch in place. No hardware palpated. Exam limited by pain   Neurological:      Mental Status: She is oriented to person, place, and time. Mental status is at baseline.         Assessment and Plan     1. Acute right hip pain  Comments:  post surgical implant of neurotranmitter and post op blunt trauma to surgical site- pain control, contact urology for sooner eval  Orders:  -     E-Consult to Urology        Discharge instructions     The following issues were discussed: The encounter diagnosis was Acute right hip pain.    "

## 2024-06-25 ENCOUNTER — TELEPHONE (OUTPATIENT)
Dept: UROLOGY | Facility: CLINIC | Age: 69
End: 2024-06-25
Payer: MEDICARE

## 2024-06-25 NOTE — TELEPHONE ENCOUNTER
Called pt and scheduled a followup appt with Ms. Fierro due to a fall she had on yesterday; concerned about her interstim.  Appt scheduled.

## 2024-06-26 ENCOUNTER — OFFICE VISIT (OUTPATIENT)
Dept: PRIMARY CARE CLINIC | Facility: CLINIC | Age: 69
End: 2024-06-26
Payer: MEDICARE

## 2024-06-26 DIAGNOSIS — K59.04 CHRONIC IDIOPATHIC CONSTIPATION: Chronic | ICD-10-CM

## 2024-06-26 DIAGNOSIS — M25.551 BILATERAL HIP PAIN: Primary | Chronic | ICD-10-CM

## 2024-06-26 DIAGNOSIS — M25.552 BILATERAL HIP PAIN: Primary | Chronic | ICD-10-CM

## 2024-06-26 DIAGNOSIS — M51.35 DDD (DEGENERATIVE DISC DISEASE), THORACOLUMBAR: Chronic | ICD-10-CM

## 2024-06-26 PROCEDURE — 99499 UNLISTED E&M SERVICE: CPT | Mod: HCNC,95,, | Performed by: INTERNAL MEDICINE

## 2024-06-26 NOTE — ASSESSMENT & PLAN NOTE
S/p R interstim placement and L hip pain worse due to putting more pressure on L now due to R hip apin - has appt w Urology NP tomorrow - encourage stay mobile and active - cont symptomatic Tx

## 2024-06-26 NOTE — PATIENT INSTRUCTIONS
If you are feeling unwell, we'd like to be the first ones to know here at [facility]! Please give us a call. Same day appointments are our top priority to keep you well and out of the emergency rooms and hospitals. Call [phone] for our direct line. After hours advice is always available. Please call [phone] after hours to speak to the on-call team.      Recommend resume bowel regimen miralax/gylcolax and metamucil at least once daily for goal 1-2 large soft BM daily  (Can titrate to up to 3x daily - or decrease dose or frequency if excessive loose stool)    Consider limit fluid intake closer to bedtime - front-load fluid intake to first 10 hrs of the day    Keep moving!

## 2024-06-26 NOTE — PROGRESS NOTES
Established Patient - Audio Only Telehealth Visit     The patient location is: Home  The chief complaint leading to consultation is: constipation - back pain - hip pain  Visit type: Virtual visit with audio only (telephone)  Total time spent with patient: 50 min      The reason for the audio only service rather than synchronous audio and video virtual visit was related to technical difficulties or patient preference/necessity.     Each patient to whom I provide medical services by telemedicine is:  (1) informed of the relationship between the physician and patient and the respective role of any other health care provider with respect to management of the patient; and (2) notified that they may decline to receive medical services by telemedicine and may withdraw from such care at any time. Patient verbally consented to receive this service via voice-only telephone call.    Nadia Damon  06/26/2024  [id number]    Elis Wick MD  Patient Care Team:  Elis Wick MD as PCP - General (Internal Medicine)  Miguel Soni Jr., MD as Consulting Physician (Vascular Surgery)  Ike King MD as Consulting Physician (Cardiology)  Courtney Tubbs MD as Consulting Physician (Cardiology)  Carter Crawford MD as Consulting Physician (Nephrology)  Paxton Vasques OD as Consulting Physician (Optometry)  PRANAY Villalobos MD as Obstetrician (Obstetrics)  Parker Mccarthy IV, MD (Urology)  Ike King MD as Consulting Physician (Cardiology)  Jose Roland MD as Consulting Physician (Dermatology)  Prasanth Johnson MD as Consulting Physician (Rheumatology)  Elis Wick MD as Physician (Internal Medicine)  Lexi Bianchi LCSW as  (Hematology and Oncology)  Candace Saleh LMSW as  (Hematology and Oncology)  Kelsie Burk PA-C as Physician Assistant (Hematology and Oncology)    History of Present Illness: History of Present Illness: Ms. Damon is a 68 year old female  here for scheduled audio f/u.  Interstim placed R hip 6/11/24 w [facility] - fell against area 3 days post-op w continued pain at the site. Was seen here in [facility] 6/24/24 Dr. BEST Hernandez.     Ms. Damon w h/o heart transplant 1993, on anti-rejection medication. 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 She was treated with 1 cycle of systemic chemotherapy cytoxan and taxotere and udenyca that was discontinued due to toxicity. She has been followed by radiation oncology and completed last radiation treatment 5/14/22. She is still followed by Heme/Onc for Epo, initiated for anemia due to stage 4 CKD.      Other medical issues include depresssion/anxiety/insomnia, hypertension, chronic diastolic CHF, and osteoporois for which she is receiving Prolia.     Cont R hip pain, abd pain - constipation - some relief w BM following dulcolax x 2   Using lidocaine patch and heating pad for back   Acetaminophen 4x day   Has been walking more again recently     During the day drinking a lot water and still doing timed toileting every 2 hrs   Only having to get up to urinate once at night - was not even sleeping in bed prior to interstim due to such frequent nocturia/nocturesis    PHQ-4 Score: 0     From last audio visit w me 6/19/24  Interstim placed 6/11/24  Still sore over R hip at site of Interstim insertion and from fall against dresser 6/13  Taking acetaminophen 500 mg 2x daily    From LOV w me 5/13/24   Has been trying to limit sodium and phosphorus - more kidney friendly food and drink  Cont urinary frequency - evaluation w Urology Dr. Cochran for possible interstim procedure this summer  Meanwhile, Q2 hr voiding is helping  PHQ-4 Score: 0     Hosp/ED/Urgent Care:  6/11/24 [facility]     Recent appointments:   6/24/24 [facility]  6/19/24 [facility] hosp f/u (audio)  5/28/24 Nephro [facility]  5/22/24  [facility]  Procedure: percutaneous InterStim lead placement   5/20/24 Infusion epo 20,000 u  5/17/24 [facility]  5/13/24 [facility]   5/06/24 [facility]    Upcoming appointments:  Future Appointments       Next 10 Appointments       Date Provider Specialty Appt Notes    6/27/2024 Sri Gallagher NP Urology F/U on Interstim    7/2/2024  Lab FASTING labs, Cyclosporine level    7/2/2024  Chemotherapy DL//retacrit monthy with cbc/ow/ngwv  (f/u with lab in aug Kelsie)/ngwv    7/2/2024  Radiology .    7/2/2024 Courtney Tubbs MD Transplant post heart    7/3/2024  Radiology     7/11/2024 Prasanth Johnson MD Rheumatology OP--DDD--OA--FIBRO///bc    7/12/2024 Sami Cochran MD Urology 4 weeks    8/19/2024 Elsi Wick MD Primary Care f/u The earliest pt can make to to appt due to having back sx in [place]    10/17/2024 Kristine Delgado NP Breast Surgery exam only              Displaying the next 10 appointments. This patient has additional appointments scheduled.           The following were reviewed: Active problem list, medication list, allergies, family history, social history, and Health Maintenance.     Patient Active Problem List   Diagnosis    Heart transplanted    Primary insomnia    CKD (chronic kidney disease) stage 4, GFR 15-29 ml/min    Gastroesophageal reflux disease without esophagitis    Essential hypertension    Aortic atherosclerosis    Secondary hyperparathyroidism, renal    Hyperkalemia    Other osteoporosis without current pathological fracture    Immunocompromised patient    Ductal carcinoma in situ (DCIS) of left breast    Ulnar neuropathy of left upper extremity    Chronic diastolic (congestive) heart failure    Anemia associated with stage 4 chronic renal failure    Secondary and unspecified malignant neoplasm of axilla and upper limb lymph nodes    Borderline abnormal TFTs    Other hyperlipidemia    DDD (degenerative disc disease), thoracolumbar    Ventral hernia  without obstruction or gangrene    Acquired hypothyroidism    FRANKY (stress urinary incontinence, female)    Prolapse of vaginal cuff after hysterectomy    Pulmonary heart disease    Urge incontinence of urine    Orthopedic device, implant, or graft complication    Hospital discharge follow-up    Bilateral hip pain    Chronic idiopathic constipation     Review of patient's allergies indicates:   Allergen Reactions    Lisinopril Swelling and Rash    Augmentin [amoxicillin-pot clavulanate] Diarrhea    Zyvox [linezolid] Nausea And Vomiting     Medications:  Current Outpatient Medications on File Prior to Visit   Medication Sig Dispense Refill    albuterol (VENTOLIN HFA) 90 mcg/actuation inhaler Inhale 2 puffs into the lungs every 6 (six) hours as needed for Wheezing or Shortness of Breath. Rescue 18 g 1    biotin 10,000 mcg Cap Take 1 tablet by mouth once daily.      calcitonin, salmon, (FORTICAL) 200 unit/actuation nasal spray USE 1 SPRAY DAILY IN ALTERNATE NOSTRIL 3 each 3    carvediloL (COREG) 6.25 MG tablet Take 1 tablet (6.25 mg total) by mouth 2 (two) times daily with meals. 180 tablet 3    cycloSPORINE modified, NEORAL, (NEORAL) 25 MG capsule Take 3 capsules (75 mg total) by mouth 2 (two) times daily. 540 capsule 1    ergocalciferol (VITAMIN D2) 50,000 unit Cap Take 1 capsule (50,000 Units total) by mouth every 14 (fourteen) days. 6 capsule 3    EVENING PRIMROSE OIL ORAL Take 1,000 mg by mouth once daily.      fluticasone propionate (FLONASE) 50 mcg/actuation nasal spray 2 sprays (100 mcg total) by Each Nostril route once daily. 16 g 1    furosemide (LASIX) 20 MG tablet TAKE 1 TABLET EVERY DAY 90 tablet 3    hydrALAZINE (APRESOLINE) 50 MG tablet Take 1 tablet (50 mg total) by mouth every 8 (eight) hours. 270 tablet 3    hydrocortisone 1 % cream APPLY TOPICALLY 2 (TWO) TIMES DAILY. 35 g 3    LIDOcaine (LIDODERM) 5 % PLACE 1 PATCH ONTO THE SKIN DAILY AS NEEDED (BACK PAIN). REMOVE AND DISCARD PATCH WITHIN 12 HOURS OR  AS DIRECTED BY MD 60 patch 5    multivitamin capsule Take 1 capsule by mouth once daily.      NIFEdipine (PROCARDIA-XL) 30 MG (OSM) 24 hr tablet TAKE 1 TABLET ONE TIME DAILY (Patient taking differently: Take 30 mg by mouth once daily.) 90 tablet 3    oxyCODONE-acetaminophen (PERCOCET) 5-325 mg per tablet Take 1 tablet by mouth every 4 (four) hours as needed for Pain. 25 tablet 0    pantoprazole (PROTONIX) 20 MG tablet TAKE 1 TABLET ONE TIME DAILY 90 tablet 3    patiromer calcium sorbitex (VELTASSA) 8.4 gram PwPk Take 1 packet (8.4 g total) by mouth every other day. 15 packet 3    polyethylene glycol (GLYCOLAX) 17 gram PwPk Take 17 g by mouth once daily.      pregabalin (LYRICA) 50 MG capsule Take 1 capsule (50 mg total) by mouth 2 (two) times daily. 180 capsule 0    psyllium husk (METAMUCIL ORAL) Take 17 g by mouth once daily.      RETACRIT 20,000 unit/mL injection       temazepam (RESTORIL) 30 mg capsule Take 1 capsule (30 mg total) by mouth nightly as needed for Insomnia. 90 capsule 0    UNABLE TO FIND medication name: Stool softener (Ducolax) Laxative      vitamin E 400 UNIT capsule Take 400 Units by mouth once daily.      zolpidem (AMBIEN) 5 MG Tab Take 1 tablet (5 mg total) by mouth nightly as needed (insomnia). 90 tablet 0     Current Facility-Administered Medications on File Prior to Visit   Medication Dose Route Frequency Provider Last Rate Last Admin    acetaminophen tablet 1,000 mg  1,000 mg Oral On Call Procedure PRANAY Villalobos MD         Medications have been reviewed and reconciled with patient at visit today.    Review of Systems   See HPI above    Exam:  BP Readings from Last 3 Encounters:   06/24/24 122/70   06/11/24 120/70   05/28/24 (!) 160/88      Wt Readings from Last 3 Encounters:   06/24/24 1432 69.5 kg (153 lb 3.5 oz)   06/11/24 0634 70.5 kg (155 lb 6.8 oz)   05/28/24 1500 70.8 kg (156 lb)      Laboratory Reviewed  Lab Results   Component Value Date    WBC 4.15 05/20/2024    HGB 9.2 (L)  05/20/2024    HCT 28.9 (L) 05/20/2024     05/20/2024    MCV 90 05/20/2024    CHOL 153 05/20/2024    TRIG 145 05/20/2024    HDL 37 (L) 05/20/2024    LDLCALC 87.0 05/20/2024    ALT 21 04/05/2024    AST 31 04/05/2024     05/20/2024    K 3.9 05/20/2024     05/20/2024    CREATININE 2.6 (H) 05/20/2024    BUN 42 (H) 05/20/2024    CO2 20 (L) 05/20/2024    MG 1.8 06/20/2023    TSH 8.112 (H) 05/01/2024    FREET4 0.97 05/01/2024    PSA <0.1 05/27/2008    INR 1.0 11/22/2021    HGBA1C 5.1 03/08/2023    CRP 14.4 (H) 08/24/2023     Lab Results   Component Value Date    .2 (H) 05/20/2024    CALCIUM 8.9 05/20/2024    CAION 1.13 09/05/2018    PHOS 3.8 05/20/2024      Lab Results   Component Value Date    LCJJSFBL50 1373 (H) 06/20/2023     Lab Results   Component Value Date    FOLATE 20.0 06/20/2023      Lab Results   Component Value Date    IRON 67 04/05/2024    TRANSFERRIN 187 (L) 04/05/2024    TIBC 277 04/05/2024    FESATURATED 24 04/05/2024      Lab Results   Component Value Date    EGFRNORACEVR 19 (A) 05/20/2024    ALBUMIN 3.3 (L) 05/20/2024     (H) 12/27/2023     Lab Results   Component Value Date    XMHTKEKI73GO 45 05/01/2024          Assessment:   68 y.o. female with multiple co-morbid illnesses here for continued follow up of medical problems.      The primary encounter diagnosis was Bilateral hip pain. Diagnoses of DDD (degenerative disc disease), thoracolumbar and Chronic idiopathic constipation were also pertinent to this visit.      Plan:   1. Bilateral hip pain  Assessment & Plan:  S/p R interstim placement and L hip pain worse due to putting more pressure on L now due to R hip apin - has appt w Urology NP tomorrow - encourage stay mobile and active - cont symptomatic Tx      2. DDD (degenerative disc disease), thoracolumbar  Overview:  Advanced, seen on CT 2023.    Assessment & Plan:  Exacerbated w recent surgical procedure - look into getting back w Interventional pain med?      3.  Chronic idiopathic constipation  Assessment & Plan:  Advised resume bowel regimen w metamucil, glycolax - cont hydration, movement            Health Maintenance         Date Due Completion Date    Annual UACr Never done ---    RSV Vaccine (Age 60+ and Pregnant patients) (1 - 1-dose 60+ series) Never done ---    COVID-19 Vaccine (7 - 2023-24 season) 09/01/2023 4/27/2023    Mammogram 04/18/2025 4/18/2024    Lipid Panel 05/20/2025 5/20/2024    DEXA Scan 01/25/2026 1/25/2023    Colorectal Cancer Screening 02/25/2026 2/25/2021    Hemoglobin A1c (Diabetic Prevention Screening) 03/08/2026 3/8/2023    TETANUS VACCINE 09/09/2030 9/9/2020            -Patient's lab results were reviewed and discussed with patient  -Treatment options and alternatives were discussed with the patient. Patient expressed understanding. Patient was given the opportunity to ask questions and be an active participant in their medical care. Patient had no further questions or concerns at this time.   -Patient is an overall moderate to HIGH risk for health complications from their medical conditions.     Follow up: Follow up in about 1 month (around 7/26/2024) for Follow Up in clinic or video virtual w me or avail provider (and keep 8/19 in clinic appt w me).    After visit summary printed and given to patient upon discharge.  Patient goals and care plan are included in After visit summary.    TOTAL TIME evaluating and managing this patient for this encounter was greater than 50 minutes. This time was spent personally by me on some of the following activities: review of patient's past medical history, assessing age-appropriate health maintenance needs, review of any interval history, review and interpretation of lab results, review and interpretation of imaging test results, review and interpretation of cardiology test results, reviewing consulting specialist notes, obtaining history from the patient and family, examination of the patient, medication  reconciliation, managing and/or ordering prescription medications, ordering imaging tests, ordering referral to subspecialty provider(s), educating patient and answering their questions about diagnosis, treatment plan, and goals of treatment, discussing planned follow-up and final documentation of the visit. This time was exclusive of any separately billable procedures for this patient and exclusive of time spent treating any other patients.     This service was not originating from a related E/M service provided within the previous 7 days nor will  to an E/M service or procedure within the next 24 hours or my soonest available appointment.  Prevailing standard of care was able to be met in this audio-only visit.

## 2024-06-27 ENCOUNTER — HOSPITAL ENCOUNTER (OUTPATIENT)
Dept: RADIOLOGY | Facility: HOSPITAL | Age: 69
Discharge: HOME | End: 2024-06-27
Attending: NURSE PRACTITIONER
Payer: MEDICARE

## 2024-06-27 ENCOUNTER — HOSPITAL ENCOUNTER (EMERGENCY)
Facility: HOSPITAL | Age: 69
Discharge: HOME | End: 2024-06-27
Attending: EMERGENCY MEDICINE
Payer: MEDICARE

## 2024-06-27 ENCOUNTER — OFFICE VISIT (OUTPATIENT)
Dept: UROLOGY | Facility: CLINIC | Age: 69
End: 2024-06-27
Payer: MEDICARE

## 2024-06-27 VITALS
DIASTOLIC BLOOD PRESSURE: 76 MMHG | SYSTOLIC BLOOD PRESSURE: 122 MMHG | HEART RATE: 92 BPM | BODY MASS INDEX: 28.2 KG/M2 | WEIGHT: 153.25 LBS | RESPIRATION RATE: 14 BRPM | HEIGHT: 62 IN

## 2024-06-27 VITALS
SYSTOLIC BLOOD PRESSURE: 172 MMHG | RESPIRATION RATE: 16 BRPM | OXYGEN SATURATION: 99 % | HEART RATE: 78 BPM | DIASTOLIC BLOOD PRESSURE: 81 MMHG | TEMPERATURE: 99 F

## 2024-06-27 DIAGNOSIS — S39.92XA BACK INJURY, INITIAL ENCOUNTER: Primary | ICD-10-CM

## 2024-06-27 DIAGNOSIS — Z96.82 PRESENCE OF NEUROSTIMULATOR: ICD-10-CM
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
## 2024-06-27 DIAGNOSIS — M48.04 DEGENERATIVE THORACIC SPINAL STENOSIS: ICD-10-CM

## 2024-06-27 DIAGNOSIS — N39.41 URGE INCONTINENCE OF URINE: Primary | ICD-10-CM

## 2024-06-27 DIAGNOSIS — M54.6 ACUTE RIGHT-SIDED THORACIC BACK PAIN: ICD-10-CM

## 2024-06-27 LAB
BACTERIA #/AREA URNS HPF: NORMAL /HPF
BILIRUB UR QL STRIP: NEGATIVE
CLARITY UR: CLEAR
COLOR UR: YELLOW
GLUCOSE UR QL STRIP: NEGATIVE
HGB UR QL STRIP: NEGATIVE
HYALINE CASTS #/AREA URNS LPF: 0 /LPF
KETONES UR QL STRIP: NEGATIVE
LEUKOCYTE ESTERASE UR QL STRIP: ABNORMAL
MICROSCOPIC COMMENT: NORMAL
NITRITE UR QL STRIP: NEGATIVE
PH UR STRIP: 6 [PH] (ref 5–8)
PROT UR QL STRIP: ABNORMAL
RBC #/AREA URNS HPF: 1 /HPF (ref 0–4)
SP GR UR STRIP: 1.01 (ref 1–1.03)
SQUAMOUS #/AREA URNS HPF: 3 /HPF
URN SPEC COLLECT METH UR: ABNORMAL
UROBILINOGEN UR STRIP-ACNC: NEGATIVE EU/DL
WBC #/AREA URNS HPF: 4 /HPF (ref 0–5)

## 2024-06-27 PROCEDURE — 74018 RADEX ABDOMEN 1 VIEW: CPT | Mod: TC,HCNC

## 2024-06-27 PROCEDURE — 99999 PR PBB SHADOW E&M-EST. PATIENT-LVL V: CPT | Mod: PBBFAC,HCNC,, | Performed by: NURSE PRACTITIONER

## 2024-06-27 PROCEDURE — 63600175 PHARM REV CODE 636 W HCPCS: Mod: HCNC | Performed by: EMERGENCY MEDICINE

## 2024-06-27 PROCEDURE — 96372 THER/PROPH/DIAG INJ SC/IM: CPT | Performed by: EMERGENCY MEDICINE

## 2024-06-27 PROCEDURE — 81000 URINALYSIS NONAUTO W/SCOPE: CPT | Mod: HCNC | Performed by: PHYSICIAN ASSISTANT

## 2024-06-27 PROCEDURE — 25000003 PHARM REV CODE 250: Mod: HCNC | Performed by: EMERGENCY MEDICINE

## 2024-06-27 PROCEDURE — 99285 EMERGENCY DEPT VISIT HI MDM: CPT | Mod: 25,HCNC

## 2024-06-27 RX ORDER — ONDANSETRON HYDROCHLORIDE 2 MG/ML
4 INJECTION, SOLUTION INTRAVENOUS
Status: COMPLETED | OUTPATIENT
Start: 2024-06-27 | End: 2024-06-27

## 2024-06-27 RX ORDER — ONDANSETRON 4 MG/1
4 TABLET, ORALLY DISINTEGRATING ORAL
Status: DISCONTINUED | OUTPATIENT
Start: 2024-06-27 | End: 2024-06-27

## 2024-06-27 RX ORDER — MORPHINE SULFATE 4 MG/ML
6 INJECTION, SOLUTION INTRAMUSCULAR; INTRAVENOUS
Status: COMPLETED | OUTPATIENT
Start: 2024-06-27 | End: 2024-06-27

## 2024-06-27 RX ORDER — METHOCARBAMOL 500 MG/1
500 TABLET, FILM COATED ORAL
Status: COMPLETED | OUTPATIENT
Start: 2024-06-27 | End: 2024-06-27

## 2024-06-27 RX ORDER — ONDANSETRON HYDROCHLORIDE 2 MG/ML
4 INJECTION, SOLUTION INTRAVENOUS
Status: DISCONTINUED | OUTPATIENT
Start: 2024-06-27 | End: 2024-06-27

## 2024-06-27 RX ADMIN — MORPHINE SULFATE 6 MG: 4 INJECTION INTRAVENOUS at 07:06

## 2024-06-27 RX ADMIN — ONDANSETRON 4 MG: 2 INJECTION INTRAMUSCULAR; INTRAVENOUS at 07:06

## 2024-06-27 RX ADMIN — METHOCARBAMOL 500 MG: 500 TABLET ORAL at 07:06

## 2024-06-27 NOTE — ED PROVIDER NOTES
SCRIBE #1 NOTE: I, Estefany An, am scribing for, and in the presence of, Leana Deshpande MD. I have scribed the entire note.       History     Chief Complaint   Patient presents with    Fall     Pt. C/o having a device implanted 2 weeks ago  for her bladder, and 3 days after the Sx pt fell and hit her right side and the pain goes up her back.      Review of patient's allergies indicates:   Allergen Reactions    Lisinopril Swelling and Rash    Augmentin [amoxicillin-pot clavulanate] Diarrhea    Zyvox [linezolid] Nausea And Vomiting         History of Present Illness     HPI    6/27/2024, 6:35 PM  History obtained from the patient      History of Present Illness: Nadia Damon is a 68 y.o. female patient with a PMHx of heart transplant (31 yrs PTA), HTN, obesity, urinary incontinence, chronic midline low back pain, heart failure, CKD, CAD, and breast cancer (2021) who presents to the Emergency Department for evaluation of right flank pain which onset 7 days PTA following a fall. Pt had a bladder neurostimulator placed 10 days PTA and 3 days later fell into her dresser hitting the area where the neurostimulator was placed. Associated sxs include right flank pain worsens with movement. Pt denies SOB. Pt reports neurostimulator is working appropriately. No further complaints or concerns at this time.       Arrival mode: by private vehicle.  PCP: Elis Wick MD        Past Medical History:  Past Medical History:   Diagnosis Date    Abdominal wall hernia     CT Renal 6/11/2018---Small fat containing superior ventral abdominal wall hernia at the epicardial pacing lead site.    Abnormal mammogram 10/12/2021    Acute cystitis without hematuria 05/10/2022    GRACE (acute kidney injury) 11/22/2021    Anxiety     Arthritis     ZEN HIPS    Breast cancer in female 08/2021    LEFT BREAST    Bronchitis 08/18/2016    Never smoked      C. difficile colitis 11/29/2021    Cellulitis of axilla, left 12/23/2021    Chronic  diastolic heart failure 12/16/2021    Chronic kidney disease     stage 4, GFR 15-29 ml/min    Chronic midline low back pain without sciatica 06/18/2018    Closed nondisplaced fracture of distal phalanx of left great toe with routine healing 10/22/2018    Coronary artery disease 1993    heart transplant    COVID-19 in immunocompromised patient 02/26/2024    Cystitis 05/10/2022    Depression     Encounter for blood transfusion     Fibromyalgia     on Lyrica    Heart failure     native heart cardiomyopathy    Heart transplanted 1993    due to cardiomyopathy    History of hyperparathyroidism; Hyperparathyroidism, secondary renal     PT DENIES    Hypertension     Immune disorder     anti rejection meds    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    Impaired mobility 07/28/2022    Iron deficiency anemia 08/15/2017    Kidney stones     passed per pt    Obesity     Obesity (BMI 30.0-34.9) 07/22/2019    Other osteoporosis without current pathological fracture 08/30/2019    Other pancytopenia 05/06/2024    Parotitis, acute 09/19/2023    Pharyngitis 01/09/2019    Pneumonia due to infectious organism 03/14/2024    PONV (postoperative nausea and vomiting)     Severe sepsis 11/22/2021    Shingles 2003 approx    left leg    Subclinical hypothyroidism 06/16/2023    Thrombocytopenia, unspecified 11/29/2021    Trouble in sleeping     Urinary incontinence        Past Surgical History:  Past Surgical History:   Procedure Laterality Date    bladder implant Right 06/11/2024    BLADDER SURGERY  2015 approx    mesh - Dr Everett then 2nd reconstructive sx Dr Onofre    BREAST BIOPSY Bilateral     NEGATIVE    BREAST BIOPSY Right 10/31/2022    benign    BREAST LUMPECTOMY Left 2021    BREAST SURGERY Left 09/28/2015    Bx - benign    BREAST SURGERY Right 12/2015    Bx benign    CARDIAC PACEMAKER REMOVAL Left 06/26/2014    Pacer defirillator removed. Put in 1993 aat time of heart transplant    CARPAL TUNNEL RELEASE Left  03/03/2015    Dr. Hall    COLONOSCOPY N/A 02/25/2021    Procedure: COLONOSCOPY;  Surgeon: Freida Ramirez MD;  Location: [facility] ENDO;  Service: Endoscopy;  Laterality: N/A;    CYSTOCELE REPAIR      Twice with mesh removal    EPIDURAL STEROID INJECTION INTO CERVICAL SPINE N/A 02/02/2023    Procedure: T11/T12 IL HELLEN;  Surgeon: Jassi Pierre MD;  Location: [facility] PAIN MGT;  Service: Pain Management;  Laterality: N/A;    HEART TRANSPLANT  1993    HERNIA REPAIR Right 1971 approx    Inguinal    HYSTERECTOMY  1983    vag hyst /LSO     IMPLANTATION OF PERMANENT SACRAL NERVE STIMULATOR N/A 06/11/2024    Procedure: INSERTION, NEUROSTIMULATOR, PERMANENT, SACRAL;  Surgeon: Sami Cochran MD;  Location: [facility] OR;  Service: Urology;  Laterality: N/A;    INCISION AND DRAINAGE OF ABSCESS Left 12/24/2021    Procedure: INCISION AND DRAINAGE, ABSCESS;  Surgeon: Joseph Longo MD;  Location: [facility] OR;  Service: General;  Laterality: Left;    INJECTION OF ANESTHETIC AGENT AROUND MEDIAL BRANCH NERVES INNERVATING LUMBAR FACET JOINT Right 10/19/2022    Procedure: Right L4/L5 and L5/S1 MBB;  Surgeon: Jassi Pierre MD;  Location: [facility] PAIN MGT;  Service: Pain Management;  Laterality: Right;    INJECTION OF ANESTHETIC AGENT AROUND MEDIAL BRANCH NERVES INNERVATING LUMBAR FACET JOINT Right 11/09/2022    Procedure: Right L4/L5 and L5/S1 MBB;  Surgeon: Jassi Pierre MD;  Location: [facility] PAIN MGT;  Service: Pain Management;  Laterality: Right;    INJECTION OF ANESTHETIC AGENT INTO SACROILIAC JOINT Right 08/22/2022    Procedure: Right SIJ Injection Right L5/S1 Facte Injection;  Surgeon: Jassi Pierre MD;  Location: [facility] PAIN MGT;  Service: Pain Management;  Laterality: Right;    INSERTION OF TUNNELED CENTRAL VENOUS CATHETER (CVC) WITH SUBCUTANEOUS PORT N/A 11/09/2021    Procedure: [id number];  Surgeon: Christoph Douglas MD;  Location: [facility] OR;  Service: General;  Laterality: N/A;    RADIOFREQUENCY THERMOCOAGULATION  Right 12/07/2022    Procedure: Right L4/L5 and L5/S1 Lumbar RFA;  Surgeon: Jassi Pierre MD;  [facility];  Service: Pain Management;  Laterality: Right;    REMOVAL OF VASCULAR ACCESS PORT      ROBOT-ASSISTED LAPAROSCOPIC ABDOMINAL SACROCOLPOPEXY N/A 08/10/2023    Procedure: ROBOTIC SACROCOLPOPEXY, ABDOMEN;  Surgeon: PRANAY Villalobos MD;  Location: [facility] OR;  Service: OB/GYN;  Laterality: N/A;    ROBOT-ASSISTED LAPAROSCOPIC OOPHORECTOMY Right 08/10/2023    Procedure: ROBOTIC OOPHORECTOMY;  Surgeon: PRANAY Villalobos MD;  Location: [facility] OR;  Service: OB/GYN;  Laterality: Right;    SENTINEL LYMPH NODE BIOPSY Left 10/12/2021    Procedure: BIOPSY, LYMPH NODE, SENTINEL;  Surgeon: Christoph Douglas MD;  Location: [facility] OR;  Service: General;  Laterality: Left;    TOE SURGERY      XI ROBOTIC URETHROPEXY N/A 08/10/2023    Procedure: XI ROBOTIC URETHROPEXY;  Surgeon: PRANAY Villalobos MD;  Location: [facility] OR;  Service: OB/GYN;  Laterality: N/A;         Family History:  Family History   Problem Relation Name Age of Onset    Cancer Mother  38        breast    Breast cancer Mother      Breast cancer Maternal Grandmother      Heart disease Maternal Grandmother      Hypertension Son      Cataracts Cousin      Diabetes Neg Hx      Stroke Neg Hx      Kidney disease Neg Hx      Asthma Neg Hx      COPD Neg Hx      Melanoma Neg Hx      Hyperlipidemia Neg Hx         Social History:  Social History     Tobacco Use    Smoking status: Never     Passive exposure: Never    Smokeless tobacco: Never   Substance and Sexual Activity    Alcohol use: Never     Alcohol/week: 0.0 standard drinks of alcohol    Drug use: No    Sexual activity: Not Currently     Partners: Male     Birth control/protection: See Surgical Hx        Review of Systems     Review of Systems   Constitutional: Negative.    HENT: Negative.     Eyes: Negative.    Respiratory: Negative.  Negative for shortness of breath.    Cardiovascular: Negative.     Gastrointestinal: Negative.    Endocrine: Negative.    Genitourinary:  Positive for flank pain (right).   Skin: Negative.    Allergic/Immunologic: Negative.    Neurological: Negative.    Hematological: Negative.    Psychiatric/Behavioral: Negative.     All other systems reviewed and are negative.     Physical Exam     Initial Vitals [06/27/24 1519]   BP Pulse Resp Temp SpO2   (!) 141/71 84 18 98 °F (36.7 °C) 100 %      MAP       --          Physical Exam  Nursing Notes and Vital Signs Reviewed.   Constitutional: Patient is in no acute distress. Well-developed and well-nourished.  Head: Atraumatic. Normocephalic.  Eyes: PERRL. EOM intact. Conjunctivae are not pale. No scleral icterus.  ENT: Mucous membranes are moist. Oropharynx is clear and symmetric.    Neck: Supple. Full ROM. No lymphadenopathy.  Cardiovascular: Regular rate. Regular rhythm. No murmurs, rubs, or gallops. Distal pulses are 2+ and symmetric.  Pulmonary/Chest: No respiratory distress. Clear to auscultation bilaterally. No wheezing or rales.  Abdominal: Soft and non-distended.  There is no tenderness.  No rebound, guarding, or rigidity. Good bowel sounds.  Genitourinary: No CVA tenderness  Musculoskeletal: Moves all extremities. No obvious deformities. No edema. No calf tenderness.  Back: No midline pain. Right flank, paraspinal tenderness. No overlying bruising, swelling, or deformity.   Skin: Warm and dry.  Neurological:  Alert, awake, and appropriate.  Normal speech.  No acute focal neurological deficits are appreciated.  Psychiatric: Normal affect. Good eye contact. Appropriate in content.     ED Course   Procedures  ED Vital Signs:  Vitals:    06/27/24 1519 06/27/24 1917 06/27/24 1939   BP: (!) 141/71  (!) 172/81   Pulse: 84  78   Resp: 18 18 16   Temp: 98 °F (36.7 °C)  98.6 °F (37 °C)   TempSrc: Oral  Oral   SpO2: 100%  99%       Abnormal Lab Results:  Labs Reviewed   URINALYSIS, REFLEX TO URINE CULTURE - Abnormal; Notable for the following  components:       Result Value    Protein, UA 1+ (*)     Leukocytes, UA 1+ (*)     All other components within normal limits    Narrative:     Specimen Source->Urine   URINALYSIS MICROSCOPIC    Narrative:     Specimen Source->Urine        All Lab Results:  Results for orders placed or performed during the hospital encounter of 06/27/24   Urinalysis, Reflex to Urine Culture Urine, Clean Catch    Specimen: Urine, Clean Catch   Result Value Ref Range    Specimen UA Urine, Clean Catch     Color, UA Yellow Yellow, Straw, [place]    Appearance, UA Clear Clear    pH, UA 6.0 5.0 - 8.0    Specific Gravity, UA 1.010 1.005 - 1.030    Protein, UA 1+ (A) Negative    Glucose, UA Negative Negative    Ketones, UA Negative Negative    Bilirubin (UA) Negative Negative    Occult Blood UA Negative Negative    Nitrite, UA Negative Negative    Urobilinogen, UA Negative <2.0 EU/dL    Leukocytes, UA 1+ (A) Negative   Urinalysis Microscopic   Result Value Ref Range    RBC, UA 1 0 - 4 /hpf    WBC, UA 4 0 - 5 /hpf    Bacteria Rare None-Occ /hpf    Squam Epithel, UA 3 /hpf    Hyaline Casts, UA 0 0-1/lpf /lpf    Microscopic Comment SEE COMMENT      *Note: Due to a large number of results and/or encounters for the requested time period, some results have not been displayed. A complete set of results can be found in Results Review.       Imaging Results:  Imaging Results              CT Abdomen Pelvis  Without Contrast (Final result)  Result time 06/27/24 16:06:56      Final result by Ori Preston MD (06/27/24 16:06:56)                   Impression:      The wires of an external cardiac pacer are visible in the left lower chest and upper abdomen.  A herniation is visible in the upper abdominal wall likely associated with the pacer and herniation of a portion of the left hepatic lobe into the hernia sac has increased since 01/04/2023.    Status post hysterectomy.    Mild, uncomplicated sigmoid colon diverticulosis.    An indeterminate  implanted device is visible with an electrode extending through sacral foramina on the right into the right presacral space.      Electronically signed by: Ori Preston  Date:    06/27/2024  Time:    16:06               Narrative:    EXAMINATION:  CT ABDOMEN PELVIS WITHOUT CONTRAST    CLINICAL HISTORY:  Abdominal trauma, blunt;    TECHNIQUE:  Low dose axial images, sagittal and coronal reformations were obtained from the lung bases to the pubic symphysis.  30 mL of oral Omnipaque 350 was administered.    COMPARISON:  01/04/2023    FINDINGS:  The lower lung zones are clear except for stable scarring/atelectasis in the lingula.  The heart is mildly enlarged.  The wires of an external cardiac pacer are again noted.  There is a herniation in the anterior wall of the upper abdomen with herniation of a portion of the left hepatic lobe extending into the hernia sac which has increased since the prior CT in 2023.  No other parenchymal abnormalities are appreciated in the liver.  The spleen, pancreas, gallbladder and adrenals appear normal.  No parenchymal abnormality, intrarenal calculi or hydronephrosis are identified in either kidney except for a simple cyst at the lower pole of the right kidney.  No free intraperitoneal fluid or lymph node enlargement is identified.  A normal appendix is seen in the right lower quadrant.    Images obtained through the pelvis demonstrate no abnormality in the urinary bladder.  The uterus and ovaries are surgically absent.  No free pelvic fluid is visible.  Scattered colonic diverticula project from the walls of the sigmoid colon.  An implanted device is visible in the right flank with in the electrode extending through sacral foramina on the right into the presacral space.                                       CT Lumbar Spine Without Contrast (Final result)  Result time 06/27/24 16:15:19      Final result by Ori Preston MD (06/27/24 16:15:19)                   Impression:       Advanced focal disc degeneration at T11-12 which may be associated with a remote injury.  Right posterolateral osteophyte formation narrows the right lateral recess and contributes to severe right foraminal stenosis at this level.    Small left paracentral to lateral disc herniation at L5-S1 mildly impinging on the left lateral recess.      Electronically signed by: Ori Preston  Date:    06/27/2024  Time:    16:15               Narrative:    EXAMINATION:  CT LUMBAR SPINE WITHOUT CONTRAST    CLINICAL HISTORY:  Low back pain, trauma;    TECHNIQUE:  Low-dose axial, sagittal and coronal reformations are obtained through the lumbar spine.  Contrast was not administered.    COMPARISON:  Plain film of the lumbosacral spine performed 12/01/2022    FINDINGS:  Alignment is normal.  There is focally advanced degenerative disc space narrowing and osteophyte formation at T11-12.  Remaining disc spaces and vertebral body heights are maintained.    Axial images demonstrate no significant abnormality at T10-T11.  At T11-12, a large right posterolateral osteophyte and facet hypertrophy produces severe right foraminal stenosis and narrowing of the right lateral recess.  The nerve root exit on the left is patent.    No significant abnormalities are visible at T12-L1 through L4-5.    At L5-S1, there is a small left paracentral to lateral disc herniation which mildly narrows the left lateral recess.                                               The Emergency Provider reviewed the vital signs and test results, which are outlined above.     ED Discussion     6:30 PM: Discussed pt's case with Dr. Susan Valera MD (Urology) verified neurostimulator position.    6:36 PM: Reassessed pt at this time. Discussed with pt all pertinent ED information and results. Discussed pt dx and plan of tx. Gave pt all f/u and return to the ED instructions. All questions and concerns were addressed at this time. Pt expresses understanding of  information and instructions, and is comfortable with plan to discharge. Pt is stable for discharge.    I discussed with patient and/or family/caretaker that evaluation in the ED does not suggest any emergent or life threatening medical conditions requiring immediate intervention beyond what was provided in the ED, and I believe patient is safe for discharge.  Regardless, an unremarkable evaluation in the ED does not preclude the development or presence of a serious of life threatening condition. As such, patient was instructed to return immediately for any worsening or change in current symptoms.         Medical Decision Making  DDX: 1. Displaced neurostimulator 2. Back strain 3. Back Contusion 4. Back Fx    Imaging reviewed and no concerns for fracture or displacement of neurostimulator case discussed briefly urology and agrees neurostimulator in correct position, reasurrance provided to patient, UA normal, given IM pain meds and muscle relaxer in the ER, findings of thoracic spine spinal stenosis noted and discussed with patient, follow up and reasons to return given.     Amount and/or Complexity of Data Reviewed  External Data Reviewed: radiology and notes.     Details: Seen in urology clinic today and had kub done which was normal  Labs: ordered. Decision-making details documented in ED Course.  Radiology: ordered. Decision-making details documented in ED Course.  Discussion of management or test interpretation with external provider(s): urology    Risk  Prescription drug management.                ED Medication(s):  Medications   morphine injection 6 mg (6 mg Intramuscular Given 6/27/24 1917)   methocarbamoL tablet 500 mg (500 mg Oral Given 6/27/24 1914)   ondansetron injection 4 mg (4 mg Intramuscular Given 6/27/24 1912)       Discharge Medication List as of 6/27/2024  6:37 PM           Follow-up Information       Elis Wick MD. Schedule an appointment as soon as possible for a visit in 2 days.     Specialty: Internal Medicine  Why: Return to the Emergency Room, If symptoms worsen  Contact information:  [address] [place] [address]  [phone]                                 Scribe Attestation:   Scribe #1: I performed the above scribed service and the documentation accurately describes the services I performed. I attest to the accuracy of the note.     Attending:   Physician Attestation Statement for Scribe #1: I, Leana Deshpande MD, personally performed the services described in this documentation, as scribed by Estefany An, in my presence, and it is both accurate and complete.           Clinical Impression       ICD-10-CM ICD-9-CM   1. Back injury, initial encounter  S39.92XA 959.19   2. Acute right-sided thoracic back pain  M54.6 724.1   3. Degenerative thoracic spinal stenosis  M48.04 724.01   4. Presence of neurostimulator  Z96.82 V45.89       Disposition:   Disposition: Discharged  Condition: Stable          Leana Deshpande MD  06/28/24 1765

## 2024-06-27 NOTE — FIRST PROVIDER EVALUATION
Medical screening examination initiated.  I have conducted a focused provider triage encounter, findings are as follows:    Brief history of present illness:  Low back and right flank pain since falling against a dresser a week ago    Vitals:    06/27/24 1519   BP: (!) 141/71   BP Location: Right arm   Patient Position: Sitting   Pulse: 84   Resp: 18   Temp: 98 °F (36.7 °C)   TempSrc: Oral   SpO2: 100%       Pertinent physical exam:  Lumbar tenderness and right flank tenderness    Brief workup plan:  CT without contrast.  (Renal failure patient)    Preliminary workup initiated; this workup will be continued and followed by the physician or advanced practice provider that is assigned to the patient when roomed.

## 2024-06-27 NOTE — PROGRESS NOTES
Chief Complaint:   Right-sided pain after fall    HPI:   Patient is a 68-year-old female that is status post InterStim placement (06/11/2024), Dr. Cochran.  Patient states that 7 days ago she fell in her bedroom, hitting her right flank on the corner of her dresser.  Has been seen by primary care and an E consult to Urology was placed.  Patient states that InterStim is working, has no leaking and can feel the stimulation.  Patient was sent for KUB, No suspicious mass-effect. Left mid abdomen calcifications present likely within bowel. Pelvic vascular phleboliths. Pelvic stimulator device noted. Abandoned lead within the left upper quadrant. No acute osseous abnormality.  Denies gross hematuria.    Allergies:  Lisinopril, Augmentin [amoxicillin-pot clavulanate], and Zyvox [linezolid]    Medications:  has a current medication list which includes the following prescription(s): biotin, calcitonin (salmon), carvedilol, cyclosporine modified (neoral), ergocalciferol, evening primrose oil, fluticasone propionate, furosemide, hydralazine, hydrocortisone, lidocaine, multivitamin, nifedipine, oxycodone-acetaminophen, pantoprazole, patiromer calcium sorbitex, polyethylene glycol, pregabalin, psyllium husk, retacrit, temazepam, UNABLE TO FIND, vitamin e, zolpidem, and albuterol, and the following Facility-Administered Medications: acetaminophen.    Review of Systems:  General: No fever, chills  Skin: No rashes, itching, or changes in color or texture of skin.  Chest: Denies DONOHUE, cyanosis, wheezing, cough, and sputum production.  Abdomen: Appetite fine. No weight loss. Denies diarrhea, abdominal pain, hematemesis, or blood in stool.  Musculoskeletal:  Right flank pain  : As above.  All other review of systems negative.    PMH:   has a past medical history of Abdominal wall hernia, Abnormal mammogram (10/12/2021), Acute cystitis without hematuria (05/10/2022), GRACE (acute kidney injury) (11/22/2021), Anxiety, Arthritis, Breast  cancer in female (08/2021), Bronchitis (08/18/2016), C. difficile colitis (11/29/2021), Cellulitis of axilla, left (12/23/2021), Chronic diastolic heart failure (12/16/2021), Chronic kidney disease, Chronic midline low back pain without sciatica (06/18/2018), Closed nondisplaced fracture of distal phalanx of left great toe with routine healing (10/22/2018), Coronary artery disease (1993), COVID-19 in immunocompromised patient (02/26/2024), Cystitis (05/10/2022), Depression, Encounter for blood transfusion, Fibromyalgia, Heart failure, Heart transplanted (1993), History of hyperparathyroidism; Hyperparathyroidism, secondary renal, Hypertension, Immune disorder, 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    PSH:   has a past surgical history that includes Cardiac pacemaker removal (Left, 06/26/2014); Bladder surgery (2015 approx); Carpal tunnel release (Left, 03/03/2015); Hysterectomy (1983); Hernia repair (Right, 1971 approx); Breast surgery (Left, 09/28/2015); Breast surgery (Right, 12/2015); Toe Surgery; Colonoscopy (N/A, 02/25/2021); Breast biopsy (Bilateral); Heart transplant (1993); [place] lymph node biopsy (Left, 10/12/2021); Insertion of tunneled central venous catheter (CVC) with subcutaneous port (N/A, 11/09/2021); Incision and drainage of abscess (Left, 12/24/2021); Removal of vascular access port; Breast lumpectomy (Left,  2021); Injection of anesthetic agent into sacroiliac joint (Right, 08/22/2022); Injection of anesthetic agent around medial branch nerves innervating lumbar facet joint (Right, 10/19/2022); Injection of anesthetic agent around medial branch nerves innervating lumbar facet joint (Right, 11/09/2022); Breast biopsy (Right, 10/31/2022); Radiofrequency thermocoagulation (Right, 12/07/2022); Epidural steroid injection into cervical spine (N/A, 02/02/2023); Cystocele repair; Robot-assisted laparoscopic abdominal sacrocolpopexy (N/A, 08/10/2023); xi robotic urethropexy (N/A, 08/10/2023); Robot-assisted laparoscopic oophorectomy (Right, 08/10/2023); Implantation of permanent sacral nerve stimulator (N/A, 06/11/2024); and bladder implant (Right, 06/11/2024).    FamHx: family history includes Breast cancer in her maternal grandmother and mother; Cancer (age of onset: 38) in her mother; Cataracts in her cousin; Heart disease in her maternal grandmother; Hypertension in her son.    SocHx:  reports that she has never smoked. She has never been exposed to tobacco smoke. She has never used smokeless tobacco. She reports that she does not drink alcohol and does not use drugs.      Physical Exam:  Vitals:    06/27/24 1330   BP: 122/76   Pulse: 92   Resp: 14     General: A&Ox3, no apparent distress, no deformities  Neck: No masses, normal thyroid  Lungs: normal inspiration, no use of accessory muscles  Heart: normal pulse, no arrhythmias  Abdomen: Soft, NT, ND, no masses, no hernias, no CVA tenderness     Labs/Studies:   06/27/2024  EXAMINATION:  XR KUB     CLINICAL HISTORY:  Unspecified fall, initial encounter     TECHNIQUE:  Single AP supine view of the abdomen (KUB) was performed     COMPARISON:  05/16/2018     FINDINGS:  Bowel gas pattern is nonobstructive.  No suspicious mass-effect.  Left mid abdomen calcifications present likely within bowel.  Pelvic vascular phleboliths.  Pelvic stimulator device noted.  Abandoned lead within  the left upper quadrant.  No acute osseous abnormality.     Impression:     Nonspecific nonobstructive mid abdomen.    Impression/Plan:   Patient was presented to Dr. Cochran for recommendation  MD recommendation included KUB and send patient to emergency department for additional evaluation secondary to fall

## 2024-06-28 ENCOUNTER — PATIENT OUTREACH (OUTPATIENT)
Dept: EMERGENCY MEDICINE | Facility: HOSPITAL | Age: 69
End: 2024-06-28
Payer: MEDICARE

## 2024-06-28 NOTE — PROGRESS NOTES
Chelsea Monte  ED Navigator  Emergency Department    Project: [facility] ED Navigator  Role: Community Health Worker    Date: 06/28/2024  Patient Name: Nadia Damon  MRN: [id number]  PCP: Elis Wikc MD    Assessment:     Nadia Damon is a 68 y.o. female who has presented to ED for ***. Patient has visited the ED 1 times in the past 3 months. Patient {did/did not:63607} contact PCP.     ED Navigator Initial Assessment    ED Navigator Enrollment Documentation  Consent to Services  Does patient consent to completing the assessment?: Yes  Contact  Method of Initial Contact: Phone  Transportation  Does the patient have issues with Transportation?: No  Does the patient have transportation to and from healthcare appointments?: Yes  Insurance Coverage  Do you have coverage/adequate coverage?: Yes  Type/kind of coverage: [employer]  Is patient able to afford co-pays/deductibles?: Yes  Is patient able to afford HME or supplies?: Yes  Does patient have an established [facility] PCP?: Yes  Able to access?: Yes  Does the patient have a lack of adequate coverage?: No  Specialist Appointment  Did the patient come to the ED to see a specialist?: No  Does the patient have a pending specialist referral?: No  Does the patient have a specialist appointment made?: Yes  Is the patient able to access a timely specialist appointment?: Yes  PCP Follow Up Appointment  Has the patient had an appointment with a primary care provider in the past year?: Yes  Approximate date: 6/26/24  Provider: Elis Wick MD  Does the patient have a follow up appontment with a PCP?: Yes  Upcoming appointment date: 7/1/24  Provider: Chelsea Collado NP  When was the last time you saw your PCP?: 6/26/24  Medications  Is patient able to afford medication?: Yes  Is patient unable to get medication due to lack of transportation?: No  Psychological  Does the patient have psycho-social concerns?: No (Comment: No Hx charted)  Food  Does the patient have  concerns about food?: No (Comment: Pt has [employer] members that assist w/food and make sure that she has enough to get through the weekend.)  Communication/Education  Does the patient have limited English proficiency/English not primary language?: No  Does patient have low literacy and/or low health literacy?: Yes  Does patient have concerns with care?: No  Does patient have dissatisfaction with care?: No  Other Financial Concerns  Does the patient have immediate financial distress?: No  Does the patient have general financial concerns?: Yes (Comment: Food)  Other Social Barriers/Concerns  Does the patient have any additional barriers or concerns?: Other (see comments)  Primary Barrier  Barriers identified: Cognitive barrier (health literacy, language and communication, etc.)  Root Cause of ED Utilization: Chronic Conditions  Plan to address Chronic Conditions: Remind patient of upcoming PCP/specialist appointment within 24 to 48 hours before scheduled appt  Next steps: Provided Education, Scheduled Appointment/Referral  Scheduled Appointment Date: 7/1/24  Appointment Reminder Date: 6/28/24  Was education/educational materials provided surrounding PCP services/creating a medical home?: Yes Was education verbal or written?: Written     Was education/educational materials provided surrounding low cost, healthy foods?: Yes Was education verbal or written?: Written     Was education/educational materials provided surrounding other items? If so, use comment to explain.: Yes Was education verbal or written?: Written   Additional Documentation: Pt was seen in the ED on 6/27/24. I spoke with pt for Post ED visit follow up navigation to assist with scheduling a 7-day Post ED visit follow up appt. Pt states that she had contacted pcp to schedule a 7-day Post ED visit follow up appt and was scheduled on 7/1/24 w/pcp. Pt states that she does have [employer] members bringing food to cover her during the week and make sure that she has  enough for the weekend. Pt does not have transportation issues and has no additional needs at this time. Closing Encounter.    Chelsea Monte               Social History     Socioeconomic History    Marital status: Single    Number of children: 2    Highest education level: 11th grade   Occupational History    Occupation: Retired   Tobacco Use    Smoking status: Never     Passive exposure: Never    Smokeless tobacco: Never   Substance and Sexual Activity    Alcohol use: Never     Alcohol/week: 0.0 standard drinks of alcohol    Drug use: No    Sexual activity: Not Currently     Partners: Male     Birth control/protection: See Surgical Hx   Other Topics Concern    Are you pregnant or think you may be? No    Breast-feeding No   Social History Narrative    Single. 2 children , 1  at 30 yoa  2014 strep throat -  pneumonia and renal complications after not completing course of AB. Other child lives in [place], [place]. Has a cousin locally that could help in an emergency. 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 Caffeine intake =- 1 cola a day. No coffee, + occasional tea, avoids caffeine especially at night. Still drives. She does not have a Living Will or Advanced directive.      Social Determinants of Health     Financial Resource Strain: Low Risk  (2024)    Overall Financial Resource Strain (CARDIA)     Difficulty of Paying Living Expenses: Not very hard   Recent Concern: Financial Resource Strain - Medium Risk (2024)    Overall Financial Resource Strain (CARDIA)     Difficulty of Paying Living Expenses: Somewhat hard   Food Insecurity: No Food Insecurity (2024)    Hunger Vital Sign     Worried About Running Out of Food in the Last Year: Never true     Ran Out of Food in the Last Year: Never true   Transportation Needs: No Transportation Needs (2024)    PRAPARE - Transportation     Lack of Transportation (Medical): No     Lack of Transportation (Non-Medical): No    Physical Activity: Insufficiently Active (5/6/2024)    Exercise Vital Sign     Days of Exercise per Week: 1 day     Minutes of Exercise per Session: 90 min   Stress: Stress Concern Present (5/6/2024)    [employer] - Occupational Stress Questionnaire     Feeling of Stress : To some extent   Housing Stability: Low Risk  (6/28/2024)    Housing Stability Vital Sign     Unable to Pay for Housing in the Last Year: No     Homeless in the Last Year: No       Plan:   Pt was seen in the ED on 6/27/24 for Acute right-sided thoracic back pain; Back injury, initial encounter; Degenerative thoracic spinal stenosis; Presence of neuro-stimulator. I spoke with pt for Post ED visit follow up navigation to assist with scheduling a 7-day Post ED visit follow up appt. Pt states that she had contacted pcp to schedule a 7-day Post ED visit follow up appt and was scheduled on 7/1/24 w/pcp. Pt states that she does have [employer] members bringing food to cover her during the week and make sure that she has enough for the weekend. Pt does not have transportation issues and has no additional needs at this time. Closing Encounter.    Chelsea Monte

## 2024-06-28 NOTE — PROGRESS NOTES
Chelsea Monte  ED Navigator  Emergency Department    Project: [facility] ED Navigator  Role: Community Health Worker    Date: 06/28/2024  Patient Name: Nadia Damon  MRN: [id number]  PCP: Elis Wick MD    Assessment:     Nadia Damon is a 68 y.o. female who has presented to ED for Acute right-sided thoracic back pain; Back injury, initial encounter; Degenerative thoracic spinal stenosis; Presence of neuro-stimulator. Patient has visited the ED 1 times in the past 3 months. Patient did contact PCP.     ED Navigator Initial Assessment    ED Navigator Enrollment Documentation  Consent to Services  Does patient consent to completing the assessment?: Yes  Contact  Method of Initial Contact: Phone  Transportation  Does the patient have issues with Transportation?: No  Does the patient have transportation to and from healthcare appointments?: Yes  Insurance Coverage  Do you have coverage/adequate coverage?: Yes  Type/kind of coverage: Humana  Is patient able to afford co-pays/deductibles?: Yes  Is patient able to afford HME or supplies?: Yes  Does patient have an established [facility] PCP?: Yes  Able to access?: Yes  Does the patient have a lack of adequate coverage?: No  Specialist Appointment  Did the patient come to the ED to see a specialist?: No  Does the patient have a pending specialist referral?: No  Does the patient have a specialist appointment made?: Yes  Is the patient able to access a timely specialist appointment?: Yes  PCP Follow Up Appointment  Has the patient had an appointment with a primary care provider in the past year?: Yes  Approximate date: 6/26/24  Provider: Elis Wick MD  Does the patient have a follow up appontment with a PCP?: Yes  Upcoming appointment date: 7/1/24  Provider: Chelsea Collado NP  When was the last time you saw your PCP?: 6/26/24  Medications  Is patient able to afford medication?: Yes  Is patient unable to get medication due to lack of transportation?:  No  Psychological  Does the patient have psycho-social concerns?: No (Comment: No Hx charted)  Food  Does the patient have concerns about food?: No (Comment: Pt has [employer] members that assist w/food and make sure that she has enough to get through the weekend.)  Communication/Education  Does the patient have limited English proficiency/English not primary language?: No  Does patient have low literacy and/or low health literacy?: Yes  Does patient have concerns with care?: No  Does patient have dissatisfaction with care?: No  Other Financial Concerns  Does the patient have immediate financial distress?: No  Does the patient have general financial concerns?: Yes (Comment: Food)  Other Social Barriers/Concerns  Does the patient have any additional barriers or concerns?: Other (see comments)  Primary Barrier  Barriers identified: Cognitive barrier (health literacy, language and communication, etc.)  Root Cause of ED Utilization: Chronic Conditions  Plan to address Chronic Conditions: Remind patient of upcoming PCP/specialist appointment within 24 to 48 hours before scheduled appt  Next steps: Provided Education, Scheduled Appointment/Referral  Scheduled Appointment Date: 7/1/24  Appointment Reminder Date: 6/28/24  Was education/educational materials provided surrounding PCP services/creating a medical home?: Yes Was education verbal or written?: Written     Was education/educational materials provided surrounding low cost, healthy foods?: Yes Was education verbal or written?: Written     Was education/educational materials provided surrounding other items? If so, use comment to explain.: Yes Was education verbal or written?: Written   Additional Documentation: Pt was seen in the ED on 6/27/24 for Acute right-sided thoracic back pain; Back injury, initial encounter; Degenerative thoracic spinal stenosis; Presence of neuro-stimulator. I spoke with pt for Post ED visit follow up navigation to assist with scheduling a 7-day  Post ED visit follow up appt. Pt states that she had contacted pcp to schedule a 7-day Post ED visit follow up appt and was scheduled on 24 w/pcp. Pt states that she does have [employer] members bringing food to cover her during the week and make sure that she has enough for the weekend. Pt does not have transportation issues and has no additional needs at this time. Closing Encounter.    Chelsea Monte               Social History     Socioeconomic History    Marital status: Single    Number of children: 2    Highest education level: 11th grade   Occupational History    Occupation: Retired   Tobacco Use    Smoking status: Never     Passive exposure: Never    Smokeless tobacco: Never   Substance and Sexual Activity    Alcohol use: Never     Alcohol/week: 0.0 standard drinks of alcohol    Drug use: No    Sexual activity: Not Currently     Partners: Male     Birth control/protection: See Surgical Hx   Other Topics Concern    Are you pregnant or think you may be? No    Breast-feeding No   Social History Narrative    Single. 2 children , 1  at 30 yoa  2014 strep throat -  pneumonia and renal complications after not completing course of AB. Other child lives in [place], [place]. Has a cousin locally that could help in an emergency. 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 Caffeine intake =- 1 cola a day. No coffee, + occasional tea, avoids caffeine especially at night. Still drives. She does not have a Living Will or Advanced directive.      Social Determinants of Health     Financial Resource Strain: Low Risk  (2024)    Overall Financial Resource Strain (CARDIA)     Difficulty of Paying Living Expenses: Not very hard   Recent Concern: Financial Resource Strain - Medium Risk (2024)    Overall Financial Resource Strain (CARDIA)     Difficulty of Paying Living Expenses: Somewhat hard   Food Insecurity: No Food Insecurity (2024)    Hunger Vital Sign     Worried About Running  Out of Food in the Last Year: Never true     Ran Out of Food in the Last Year: Never true   Transportation Needs: No Transportation Needs (6/28/2024)    PRAPARE - Transportation     Lack of Transportation (Medical): No     Lack of Transportation (Non-Medical): No   Physical Activity: Insufficiently Active (5/6/2024)    Exercise Vital Sign     Days of Exercise per Week: 1 day     Minutes of Exercise per Session: 90 min   Stress: Stress Concern Present (5/6/2024)    [employer] - Occupational Stress Questionnaire     Feeling of Stress : To some extent   Housing Stability: Low Risk  (6/28/2024)    Housing Stability Vital Sign     Unable to Pay for Housing in the Last Year: No     Homeless in the Last Year: No       Plan:   Pt was seen in the ED on 6/27/24 for Acute right-sided thoracic back pain; Back injury, initial encounter; Degenerative thoracic spinal stenosis; Presence of neuro-stimulator. I spoke with pt for Post ED visit follow up navigation to assist with scheduling a 7-day Post ED visit follow up appt. Pt states that she had contacted pcp to schedule a 7-day Post ED visit follow up appt and was scheduled on 7/1/24 w/pcp. Pt states that she does have [employer] members bringing food to cover her during the week and make sure that she has enough for the weekend. Pt does not have transportation issues and has no additional needs at this time. Closing Encounter.    Chelsea Monte      Appointment made with: Elis Wick MD

## 2024-06-28 NOTE — PROGRESS NOTES
Pt was seen in the ED on 6/27/24. Pt was scheduled a 7-day Post ED visit follow up appt and was contacted to provide a reminder for the upcoming appt scheduled with Chelsea Collado on 7/1/24 at 7:20 a.m. Pt is planning to attend. Closing Encounter.     Chelsea Monte

## 2024-06-30 NOTE — PROGRESS NOTES
Primary Care Telemedicine Appointment      The patient location is:  Patient Home   The chief complaint leading to consultation is: right flank and back pain  Total time spent on medical decision making was 25 min.    Visit type: Virtual visit with synchronous audio only and video  Each patient to whom he or she provides medical services by telemedicine is:  (1) informed of the relationship between the physician and patient and the respective role of any other health care provider with respect to management of the patient; and (2) notified that he or she may decline to receive medical services by telemedicine and may withdraw from such care at any time.      Subjective:      Patient ID: Nadia Damon is a 68 y.o. female     Chief Complaint: Back Pain    Prior to this visit, patient's last encounter with PCP was 6/26/2024.    Last saw  6/26/24.    Attempted Virtual visit, pt unable to complete check in process. Deferred to Audio appointment.   Visit is to follow up for ED visit on 6/27/. Pt with heart transplant, HTN, chronic low back pain, heart failure, CKD, CAD and breast cancer. Experienced right flank pain.   Recent placement of bladder neurostimulator.  Three days post procedure she fell hitting her surgical site on her dresser.    Saw Sri Gallagher, Urology and due to pain she presented to ED for evaluation.  Bladder stimulator is working properly per patient.  Right flank pain worsens with movement.   CT imaging L spine and Abd/Pelvis - no acute findings found  U/A + 1 leukocytes, no RBC. Treated for back pain and discharged    Nausea - vomited water after return from the ED. Did not get nausea Rx  Now tolerating chicken noodle soup and fluids.     CT L Spine noted small left paracentral to lateral disc herniation at L5-S1 mildly impinging on the left lateral recess . Using a cane since the fall on the dresser. Now with a limp.  Pains worsens with movement - pain to right waistline that radiates  "toward T spine "6"  Takes Tylenol QID.     In past, did see pain mgt - Dr. Pierre - received ICS injections & burned nerves without back pain improvement.   Does note chronic swelling to right leg.     Denies f/c/sw, denies CP/SOB/Palp.  No bowel complaints.  No urinary symptoms.       Past Surgical History:   Procedure Laterality Date    bladder implant Right 06/11/2024    BLADDER SURGERY  2015 approx    mesh - Dr Everett then 2nd reconstructive sx Dr Onofre    BREAST BIOPSY Bilateral     NEGATIVE    BREAST BIOPSY Right 10/31/2022    benign    BREAST LUMPECTOMY Left 2021    BREAST SURGERY Left 09/28/2015    Bx - benign    BREAST SURGERY Right 12/2015    Bx benign    CARDIAC PACEMAKER REMOVAL Left 06/26/2014    Pacer defirillator removed. Put in 1993 aat time of heart transplant    CARPAL TUNNEL RELEASE Left 03/03/2015    Dr. Hall    COLONOSCOPY N/A 02/25/2021    Procedure: COLONOSCOPY;  Surgeon: Freida Ramirez MD;  Location: [facility] ENDO;  Service: Endoscopy;  Laterality: N/A;    CYSTOCELE REPAIR      Twice with mesh removal    EPIDURAL STEROID INJECTION INTO CERVICAL SPINE N/A 02/02/2023    Procedure: T11/T12 IL HELLEN;  Surgeon: Jassi Pierre MD;  Location: [facility] PAIN MGT;  Service: Pain Management;  Laterality: N/A;    HEART TRANSPLANT  1993    HERNIA REPAIR Right 1971 approx    Inguinal    HYSTERECTOMY  1983    vag hyst /LSO     IMPLANTATION OF PERMANENT SACRAL NERVE STIMULATOR N/A 06/11/2024    Procedure: INSERTION, NEUROSTIMULATOR, PERMANENT, SACRAL;  Surgeon: Sami Cochran MD;  Location: [facility] OR;  Service: Urology;  Laterality: N/A;    INCISION AND DRAINAGE OF ABSCESS Left 12/24/2021    Procedure: INCISION AND DRAINAGE, ABSCESS;  Surgeon: Joseph Longo MD;  Location: [facility] OR;  Service: General;  Laterality: Left;    INJECTION OF ANESTHETIC AGENT AROUND MEDIAL BRANCH NERVES INNERVATING LUMBAR FACET JOINT Right 10/19/2022    Procedure: Right L4/L5 and L5/S1 MBB;  Surgeon: Jassi Pierre, " MD;  Location: [facility] PAIN MGT;  Service: Pain Management;  Laterality: Right;    INJECTION OF ANESTHETIC AGENT AROUND MEDIAL BRANCH NERVES INNERVATING LUMBAR FACET JOINT Right 11/09/2022    Procedure: Right L4/L5 and L5/S1 MBB;  Surgeon: Jassi Pierre MD;  Location: [facility] PAIN MGT;  Service: Pain Management;  Laterality: Right;    INJECTION OF ANESTHETIC AGENT INTO SACROILIAC JOINT Right 08/22/2022    Procedure: Right SIJ Injection Right L5/S1 Facte Injection;  Surgeon: Jassi Pierre MD;  Location: [facility] PAIN MGT;  Service: Pain Management;  Laterality: Right;    INSERTION OF TUNNELED CENTRAL VENOUS CATHETER (CVC) WITH SUBCUTANEOUS PORT N/A 11/09/2021    Procedure: [id number];  Surgeon: Christoph oDuglas MD;  Location: [facility] OR;  Service: General;  Laterality: N/A;    RADIOFREQUENCY THERMOCOAGULATION Right 12/07/2022    Procedure: Right L4/L5 and L5/S1 Lumbar RFA;  Surgeon: Jassi Pierre MD;  Location: [facility] PAIN MGT;  Service: Pain Management;  Laterality: Right;    REMOVAL OF VASCULAR ACCESS PORT      ROBOT-ASSISTED LAPAROSCOPIC ABDOMINAL SACROCOLPOPEXY N/A 08/10/2023    Procedure: ROBOTIC SACROCOLPOPEXY, ABDOMEN;  Surgeon: PRANAY Villalobos MD;  Location: [facility] OR;  Service: OB/GYN;  Laterality: N/A;    ROBOT-ASSISTED LAPAROSCOPIC OOPHORECTOMY Right 08/10/2023    Procedure: ROBOTIC OOPHORECTOMY;  Surgeon: PRANAY Villalobos MD;  Location: [facility] OR;  Service: OB/GYN;  Laterality: Right;    SENTINEL LYMPH NODE BIOPSY Left 10/12/2021    Procedure: BIOPSY, LYMPH NODE, SENTINEL;  Surgeon: Christoph Douglas MD;  Location: [facility] OR;  Service: General;  Laterality: Left;    TOE SURGERY      XI ROBOTIC URETHROPEXY N/A 08/10/2023    Procedure: XI ROBOTIC URETHROPEXY;  Surgeon: PRANAY Villalobos MD;  Location: [facility] OR;  Service: OB/GYN;  Laterality: N/A;       Past Medical History:   Diagnosis Date    Abdominal wall hernia     CT Renal 6/11/2018---Small fat containing superior ventral abdominal wall hernia at  the epicardial pacing lead site.    Abnormal mammogram 10/12/2021    Acute cystitis without hematuria 05/10/2022    GRACE (acute kidney injury) 11/22/2021    Anxiety     Arthritis     ZEN HIPS    Breast cancer in female 08/2021    LEFT BREAST    Bronchitis 08/18/2016    Never smoked      C. difficile colitis 11/29/2021    Cellulitis of axilla, left 12/23/2021    Chronic diastolic heart failure 12/16/2021    Chronic kidney disease     stage 4, GFR 15-29 ml/min    Chronic midline low back pain without sciatica 06/18/2018    Closed nondisplaced fracture of distal phalanx of left great toe with routine healing 10/22/2018    Coronary artery disease 1993    heart transplant    COVID-19 in immunocompromised patient 02/26/2024    Cystitis 05/10/2022    Depression     Encounter for blood transfusion     Fibromyalgia     on Lyrica    Heart failure     native heart cardiomyopathy    Heart transplanted 1993    due to cardiomyopathy    History of hyperparathyroidism; Hyperparathyroidism, secondary renal     PT DENIES    Hypertension     Immune disorder     anti rejection meds    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    Impaired mobility 07/28/2022    Iron deficiency anemia 08/15/2017    Kidney stones     passed per pt    Obesity     Obesity (BMI 30.0-34.9) 07/22/2019    Other osteoporosis without current pathological fracture 08/30/2019    Other pancytopenia 05/06/2024    Parotitis, acute 09/19/2023    Pharyngitis 01/09/2019    Pneumonia due to infectious organism 03/14/2024    PONV (postoperative nausea and vomiting)     Severe sepsis 11/22/2021    Shingles 2003 approx    left leg    Subclinical hypothyroidism 06/16/2023    Thrombocytopenia, unspecified 11/29/2021    Trouble in sleeping     Urinary incontinence        Review of Systems    Objective:   There were no vitals filed for this visit.      There is no height or weight on file to calculate BMI.  BP Readings from Last 3 Encounters:   06/27/24 (!)  172/81   06/27/24 122/76   06/24/24 122/70      Wt Readings from Last 3 Encounters:   06/27/24 1330 69.5 kg (153 lb 3.5 oz)   06/24/24 1432 69.5 kg (153 lb 3.5 oz)   06/11/24 0634 70.5 kg (155 lb 6.8 oz)        Physical Exam    Lab Results   Component Value Date    WBC 4.15 05/20/2024    HGB 9.2 (L) 05/20/2024    HCT 28.9 (L) 05/20/2024     05/20/2024    CHOL 153 05/20/2024    TRIG 145 05/20/2024    HDL 37 (L) 05/20/2024    ALT 21 04/05/2024    AST 31 04/05/2024     05/20/2024    K 3.9 05/20/2024     05/20/2024    CREATININE 2.6 (H) 05/20/2024    BUN 42 (H) 05/20/2024    CO2 20 (L) 05/20/2024    TSH 8.112 (H) 05/01/2024    PSA <0.1 05/27/2008    INR 1.0 11/22/2021    HGBA1C 5.1 03/08/2023         Assessment:       Plan:     Problem List Items Addressed This Visit       DDD (degenerative disc disease), thoracolumbar (Chronic)    Spinal stenosis of lumbosacral region    Relevant Orders    Ambulatory referral/consult to Neurosurgery    Urge incontinence of urine     S/P placement of bladder neurostimulator  Recent fall - no injury to surgical site          Other Visit Diagnoses       Nausea and vomiting, unspecified vomiting type    -  Primary    Relevant Medications    ondansetron (ZOFRAN) 4 MG tablet            Health Maintenance         Date Due Completion Date    RSV Vaccine (Age 60+ and Pregnant patients) (1 - 1-dose 60+ series) Never done ---    COVID-19 Vaccine (7 - 2023-24 season) 09/01/2023 4/27/2023    Influenza Vaccine (1) 09/01/2024 11/2/2023    Mammogram 04/18/2025 4/18/2024    Lipid Panel 05/20/2025 5/20/2024    DEXA Scan 01/25/2026 1/25/2023    Colorectal Cancer Screening 02/25/2026 2/25/2021    Hemoglobin A1c (Diabetic Prevention Screening) 03/08/2026 3/8/2023    TETANUS VACCINE 09/09/2030 9/9/2020            1. Nausea and vomiting, unspecified vomiting type  -     ondansetron (ZOFRAN) 4 MG tablet; Take 1 tablet (4 mg total) by mouth every 6 (six) hours as needed for Nausea.   Dispense: 16 tablet; Refill: 0    2. Spinal stenosis of lumbosacral region  -     Ambulatory referral/consult to Neurosurgery; Future; Expected date: 07/08/2024    3. Urge incontinence of urine  Assessment & Plan:  S/P placement of bladder neurostimulator  Recent fall - no injury to surgical site      4. DDD (degenerative disc disease), thoracolumbar  Overview:  Advanced, seen on CT 2023.                        ROSSANA Goldsmith, NP-C  [facility] [address] [place] [address]  [phone]   [phone] fax

## 2024-07-01 ENCOUNTER — TELEPHONE (OUTPATIENT)
Dept: NEUROSURGERY | Facility: CLINIC | Age: 69
End: 2024-07-01
Payer: MEDICARE

## 2024-07-01 ENCOUNTER — TELEPHONE (OUTPATIENT)
Dept: INFUSION THERAPY | Facility: HOSPITAL | Age: 69
End: 2024-07-01
Payer: MEDICARE

## 2024-07-01 ENCOUNTER — OFFICE VISIT (OUTPATIENT)
Dept: PRIMARY CARE CLINIC | Facility: CLINIC | Age: 69
End: 2024-07-01
Payer: MEDICARE

## 2024-07-01 ENCOUNTER — TELEPHONE (OUTPATIENT)
Dept: PRIMARY CARE CLINIC | Facility: CLINIC | Age: 69
End: 2024-07-01

## 2024-07-01 DIAGNOSIS — M51.35 DDD (DEGENERATIVE DISC DISEASE), THORACOLUMBAR: Chronic | ICD-10-CM

## 2024-07-01 DIAGNOSIS — N39.41 URGE INCONTINENCE OF URINE: ICD-10-CM

## 2024-07-01 DIAGNOSIS — R11.2 NAUSEA AND VOMITING, UNSPECIFIED VOMITING TYPE: Primary | ICD-10-CM

## 2024-07-01 DIAGNOSIS — M48.07 SPINAL STENOSIS OF LUMBOSACRAL REGION: ICD-10-CM

## 2024-07-01 RX ORDER — ONDANSETRON 4 MG/1
4 TABLET, FILM COATED ORAL EVERY 6 HOURS PRN
Qty: 16 TABLET | Refills: 0 | Status: SHIPPED | OUTPATIENT
Start: 2024-07-01

## 2024-07-02 ENCOUNTER — OFFICE VISIT (OUTPATIENT)
Dept: TRANSPLANT | Facility: CLINIC | Age: 69
End: 2024-07-02
Payer: MEDICARE

## 2024-07-02 ENCOUNTER — TELEPHONE (OUTPATIENT)
Dept: PRIMARY CARE CLINIC | Facility: CLINIC | Age: 69
End: 2024-07-02
Payer: MEDICARE

## 2024-07-02 ENCOUNTER — INFUSION (OUTPATIENT)
Dept: INFUSION THERAPY | Facility: HOSPITAL | Age: 69
End: 2024-07-02
Payer: MEDICARE

## 2024-07-02 ENCOUNTER — HOSPITAL ENCOUNTER (OUTPATIENT)
Dept: RADIOLOGY | Facility: HOSPITAL | Age: 69
Discharge: HOME | End: 2024-07-02
Attending: INTERNAL MEDICINE
Payer: MEDICARE

## 2024-07-02 VITALS
TEMPERATURE: 99 F | RESPIRATION RATE: 18 BRPM | OXYGEN SATURATION: 100 % | DIASTOLIC BLOOD PRESSURE: 74 MMHG | HEART RATE: 90 BPM | SYSTOLIC BLOOD PRESSURE: 127 MMHG

## 2024-07-02 VITALS
HEIGHT: 62 IN | SYSTOLIC BLOOD PRESSURE: 150 MMHG | BODY MASS INDEX: 29.38 KG/M2 | HEART RATE: 86 BPM | WEIGHT: 159.63 LBS | DIASTOLIC BLOOD PRESSURE: 70 MMHG

## 2024-07-02 DIAGNOSIS — M81.8 OTHER OSTEOPOROSIS WITHOUT CURRENT PATHOLOGICAL FRACTURE: Primary | ICD-10-CM

## 2024-07-02 DIAGNOSIS — N18.4 ANEMIA ASSOCIATED WITH STAGE 4 CHRONIC RENAL FAILURE: ICD-10-CM

## 2024-07-02 DIAGNOSIS — I10 ESSENTIAL HYPERTENSION: Chronic | ICD-10-CM

## 2024-07-02 DIAGNOSIS — R06.02 SHORTNESS OF BREATH: ICD-10-CM

## 2024-07-02 DIAGNOSIS — D63.1 ANEMIA ASSOCIATED WITH STAGE 4 CHRONIC RENAL FAILURE: ICD-10-CM

## 2024-07-02 DIAGNOSIS — Z79.899 IMMUNOCOMPROMISED STATE DUE TO DRUG THERAPY: ICD-10-CM

## 2024-07-02 DIAGNOSIS — I70.0 AORTIC ATHEROSCLEROSIS: Chronic | ICD-10-CM

## 2024-07-02 DIAGNOSIS — Z94.1 STATUS POST HEART TRANSPLANT: ICD-10-CM

## 2024-07-02 DIAGNOSIS — N18.4 CKD (CHRONIC KIDNEY DISEASE) STAGE 4, GFR 15-29 ML/MIN: Chronic | ICD-10-CM

## 2024-07-02 DIAGNOSIS — T86.20 COMPLICATION OF HEART TRANSPLANT, UNSPECIFIED COMPLICATION: ICD-10-CM

## 2024-07-02 DIAGNOSIS — D84.821 IMMUNOCOMPROMISED STATE DUE TO DRUG THERAPY: ICD-10-CM

## 2024-07-02 DIAGNOSIS — Z79.899 ENCOUNTER FOR LONG-TERM (CURRENT) USE OF MEDICATIONS: ICD-10-CM

## 2024-07-02 DIAGNOSIS — Z94.1 HEART TRANSPLANTED: Primary | Chronic | ICD-10-CM

## 2024-07-02 PROCEDURE — 3066F NEPHROPATHY DOC TX: CPT | Mod: HCNC,CPTII,S$GLB, | Performed by: INTERNAL MEDICINE

## 2024-07-02 PROCEDURE — 71046 X-RAY EXAM CHEST 2 VIEWS: CPT | Mod: 26,HCNC,, | Performed by: RADIOLOGY

## 2024-07-02 PROCEDURE — 3078F DIAST BP <80 MM HG: CPT | Mod: HCNC,CPTII,S$GLB, | Performed by: INTERNAL MEDICINE

## 2024-07-02 PROCEDURE — 96372 THER/PROPH/DIAG INJ SC/IM: CPT | Mod: HCNC

## 2024-07-02 PROCEDURE — 71046 X-RAY EXAM CHEST 2 VIEWS: CPT | Mod: TC,HCNC

## 2024-07-02 PROCEDURE — 63600175 PHARM REV CODE 636 W HCPCS: Mod: JZ,EC,JG,HCNC | Performed by: INTERNAL MEDICINE

## 2024-07-02 PROCEDURE — 3077F SYST BP >= 140 MM HG: CPT | Mod: HCNC,CPTII,S$GLB, | Performed by: INTERNAL MEDICINE

## 2024-07-02 PROCEDURE — 99214 OFFICE O/P EST MOD 30 MIN: CPT | Mod: HCNC,S$GLB,, | Performed by: INTERNAL MEDICINE

## 2024-07-02 PROCEDURE — 3008F BODY MASS INDEX DOCD: CPT | Mod: HCNC,CPTII,S$GLB, | Performed by: INTERNAL MEDICINE

## 2024-07-02 PROCEDURE — 1157F ADVNC CARE PLAN IN RCRD: CPT | Mod: HCNC,CPTII,S$GLB, | Performed by: INTERNAL MEDICINE

## 2024-07-02 PROCEDURE — 99999 PR PBB SHADOW E&M-EST. PATIENT-LVL II: CPT | Mod: PBBFAC,HCNC,, | Performed by: INTERNAL MEDICINE

## 2024-07-02 PROCEDURE — 1159F MED LIST DOCD IN RCRD: CPT | Mod: HCNC,CPTII,S$GLB, | Performed by: INTERNAL MEDICINE

## 2024-07-02 PROCEDURE — 1160F RVW MEDS BY RX/DR IN RCRD: CPT | Mod: HCNC,CPTII,S$GLB, | Performed by: INTERNAL MEDICINE

## 2024-07-02 RX ADMIN — EPOETIN ALFA-EPBX 20000 UNITS: 10000 INJECTION, SOLUTION INTRAVENOUS; SUBCUTANEOUS at 10:07

## 2024-07-02 NOTE — PROGRESS NOTES
Subjective:   Ms. Damon is a 68 y.o. year old Black or  female who received a  heart transplant on 5/27/93.          HPI  Ms. Damon is a very pleasant 67 y/o AAF s/p OHTx 5/27/93 at [facility], s/p PPM same date, mild anemia and leukopenia, OA, cervical spine DJD with radiculopathy and chronic neck pain who presents for her 31st post annual transplant evaluation. Four years ago after her annual was found to have BRCA, underwent excision, chemo/radiation, had a rough time of it, with some complications related to it, however recovered well.     Today is recovering form a bladder stimulator, had the bladder pulled up, surgery went well but recovering from it still. Had a neurostimulator sacral placed in 06/11/24. Did have a fall in between went to ED, got pain med, injection. Going to spinal specialist for back issues. Doing well from cardiac standpoint.   Most significant issue is the back pain issue, stable from our standpoint. Is walking with a limp since getting the neurostimulator placed, used a cane today, had a walker in initially post op, better but still major issue. Other issue since our last visit is CKD, remains an issue, followed by Dr. Crawford closely for this. Labs reviewed today. Up to date on mammogram this year, is due DEXA tomorrow.     CXR today reviewed:   Bandlike scarring in the left midlung.  The lungs are otherwise clear. No pneumothorax.  The heart size is stable.  Post median sternotomy.  External pacer device present.    TTE 05/28/24:    Left Ventricle: The left ventricle is normal in size. Normal wall thickness. There is mild asymmetric hypertrophy. Normal wall motion. Septal flattening in systole consistent with right ventricular pressure overload. There is normal systolic function with a visually estimated ejection fraction of 55 - 60%. There is diastolic dysfunction but grade cannot be determined.    Right Ventricle: Moderate right ventricular enlargement. Wall  thickness is normal. Systolic function is normal.    Left Atrium: Left atrium is severely dilated.    Right Atrium: Right atrium is moderately dilated.    Tricuspid Valve: There is moderate regurgitation.    IVC/SVC: Intermediate venous pressure at 8 mmHg.  Echo today:  The left ventricle is normal in size with concentric hypertrophy and normal systolic function.  Indeterminate left ventricular diastolic function.  The estimated PA systolic pressure is 37 mmHg.  Normal right ventricular size with normal right ventricular systolic function.  Normal central venous pressure (3 mmHg).  The estimated ejection fraction is 60%.  Moderate tricuspid regurgitation.  Mild pulmonic regurgitation.        Cxr: Comparison is made to January 4, 2023.  Electrical lead overlies the lower chest and upper abdomen.  Surgical clips overlie the upper abdomen.  Mediastinal silhouette is prominent.  The lungs are clear.  No pneumothorax or significant pleural effusion    DSE 05/24/21:  The left ventricle is normal in size with concentric remodeling and normal systolic function.  The patient reached the end of the protocol.  There were no arrhythmias during stress.  Severe left atrial enlargement.  The estimated ejection fraction is 55%.  Grade II left ventricular diastolic dysfunction.  Normal right ventricular size with normal right ventricular systolic function.  Mild-to-moderate mitral regurgitation.  Mild to moderate tricuspid regurgitation.  Normal central venous pressure (3 mmHg).  The estimated PA systolic pressure is 39 mmHg.  The stress echo portion of this study is negative for myocardial ischemia.  Severe left atrial enlargement.  The ECG portion of this study is negative for myocardial ischemia.       Review of Systems   Constitutional: Negative. Negative for chills, decreased appetite, diaphoresis, fever, malaise/fatigue, night sweats, weight gain and weight loss.   Eyes: Negative.  Negative for visual disturbance.  "  Cardiovascular:  Positive for leg swelling (improves with elevation). Negative for chest pain, claudication, cyanosis, dyspnea on exertion, irregular heartbeat, near-syncope, orthopnea, palpitations, paroxysmal nocturnal dyspnea and syncope.   Respiratory:  Negative for cough, hemoptysis, shortness of breath, sleep disturbances due to breathing, snoring, sputum production and wheezing.    Endocrine: Negative.    Hematologic/Lymphatic: Negative.  Negative for adenopathy and bleeding problem. Does not bruise/bleed easily.   Skin:  Negative for color change, dry skin, nail changes, poor wound healing, rash, skin cancer and suspicious lesions.   Musculoskeletal: Negative.  Negative for back pain, falls, gout, joint pain and muscle weakness.   Gastrointestinal: Negative.  Negative for bloating, abdominal pain, anorexia, constipation, diarrhea, heartburn, hematemesis, hematochezia, hemorrhoids, melena, nausea and vomiting.   Genitourinary: Negative.  Negative for nocturia.   Neurological:  Negative for excessive daytime sleepiness, dizziness, focal weakness, headaches, light-headedness, paresthesias, tremors and weakness.   Psychiatric/Behavioral:  Negative for depression and memory loss. The patient does not have insomnia and is not nervous/anxious.        Objective:   Blood pressure (!) 150/70, pulse 86, height 5' 2" (1.575 m), weight 72.4 kg (159 lb 9.8 oz), last menstrual period 06/20/1983.body mass index is 29.19 kg/m².  Blood pressure is up because of significant pain today  Physical Exam  Vitals and nursing note reviewed.   Constitutional:       General: She is not in acute distress.     Appearance: She is well-developed. She is not diaphoretic.   HENT:      Head: Normocephalic and atraumatic.      Mouth/Throat:      Pharynx: No oropharyngeal exudate.   Eyes:      General: No scleral icterus.     Conjunctiva/sclera: Conjunctivae normal.      Pupils: Pupils are equal, round, and reactive to light.   Neck:      " Vascular: No JVD.   Cardiovascular:      Rate and Rhythm: Regular rhythm. Tachycardia present.      Chest Wall: PMI is not displaced.      Heart sounds: Normal heart sounds. No murmur heard.     No friction rub. No gallop.   Pulmonary:      Effort: Pulmonary effort is normal. No respiratory distress.      Breath sounds: Normal breath sounds. No wheezing or rales.   Chest:      Chest wall: No tenderness.   Abdominal:      General: Bowel sounds are normal. There is no distension.      Palpations: Abdomen is soft. There is no mass.      Tenderness: There is no abdominal tenderness. There is no guarding or rebound.   Musculoskeletal:         General: No tenderness. Normal range of motion.      Cervical back: Normal range of motion and neck supple.      Comments: 1+ pitting edema bilateral lower extremities. No socks on today   Skin:     General: Skin is warm and dry.      Coloration: Skin is not pale.      Findings: No erythema or rash.   Neurological:      Mental Status: She is alert and oriented to person, place, and time.   Psychiatric:         Behavior: Behavior normal.         Thought Content: Thought content normal.         Judgment: Judgment normal.         Lab Results   Component Value Date    WBC 2.99 (L) 07/02/2024    HGB 9.1 (L) 07/02/2024    HCT 28.7 (L) 07/02/2024    MCV 89 07/02/2024     07/02/2024    CO2 20 (L) 07/02/2024    CREATININE 2.9 (H) 07/02/2024    CALCIUM 8.8 07/02/2024    ALBUMIN 3.4 (L) 07/02/2024    AST 41 (H) 07/02/2024     (H) 07/02/2024    ALT 30 07/02/2024    .2 (H) 05/20/2024       Lab Results   Component Value Date    INR 1.0 11/22/2021    INR 1.0 11/10/2020    INR 1.0 01/07/2018       BNP   Date Value Ref Range Status   07/02/2024 407 (H) 0 - 99 pg/mL Final     Comment:     Values of less than 100 pg/ml are consistent with non-CHF populations.   12/27/2023 591 (H) 0 - 99 pg/mL Final     Comment:     Values of less than 100 pg/ml are consistent with non-CHF  "populations.   09/19/2023 166 (H) 0 - 99 pg/mL Final     Comment:     Values of less than 100 pg/ml are consistent with non-CHF populations.       LD   Date Value Ref Range Status   08/02/2007 315 (H) 110 - 260 U/L Final     No results found for: "SIROLIMUS"  Cyclosporine, LC/MS   Date Value Ref Range Status   11/15/2023 77 (L) 100 - 400 ng/mL Final     Comment:     Reference Normals:  For Kidney Transplants: 100-300 ng/mL    Testing performed by Liquid Chromatography-Tandem  Mass Spectrometry (LC-MS/MS).    This test was developed and its performance characteristics  determined by [facility], Department of Pathology  and Laboratory Medicine in a manner consistent with CLIA  requirements. It has not been cleared or approved by the [place]  Food and Drug Administration.  This test is used for clinical  purposes.  It should not be regarded as investigational or for  research.         Assessment:     1. Heart transplanted    2. Essential hypertension    3. CKD (chronic kidney disease) stage 4, GFR 15-29 ml/min    4. Aortic atherosclerosis    5. Immunocompromised state due to drug therapy          Plan:   Patient is 30 years post OHT, going to [place] next week for her 69th birthday, hoping to not have any issues, may need another letter. She is not sure she can handle th flight given her pain but is going to see how she does, may reach out to get another letter.   Hypertension- in severe pain today, states usually stable, this is due to pain. No change but will make sure improves.   CKD- Followed closely by Yvette carmona at [facility]. Encourage drinking more fluids, will hopefully do better with increased hydration.   Aortic atherosclerosis/hyperlipidemia- never tolerated statin before, will see if there is an ability to try again    Return instructions as set forth by post transplant schedule or as needed:    Clinic: Return for labs and/or biopsy weekly the first month, every two weeks during month 2 and then " monthly for the first year at the provider or coordinator's discretion. During the second year, return to clinic every 3 months. Post transplant year 3-5 return every 6 months. There will be a comprehensive post transplant evaluation every year that may include LHC/RHC/biopsy, stress test, echo, CXR, and other health screening exams.    In addition to the clinical assessment, I have ordered Allomap testing for this patient to assist in identification of moderate/severe acute cellular rejection (ACR) in a pt with stable Allograft function instead of endomyocardial biopsy.     Patient is reminded to call with any health changes as these can be early signs of transplant complications. Patient is advised to make sure any new medications or changes of old medications are discussed with a pharmacist or physician knowledgeable with transplant to avoid rejection/drug toxicity related to significant drug interactions.    UNOS Patient Status  Functional Status: 80% - Normal activity with effort: some symptoms of disease  Physical Capacity: No Limitations  Working for Income: No  If no, reason not working: Demands of Treatment    Courtney Tubbs MD

## 2024-07-03 ENCOUNTER — TELEPHONE (OUTPATIENT)
Dept: NEUROSURGERY | Facility: CLINIC | Age: 69
End: 2024-07-03
Payer: MEDICARE

## 2024-07-03 ENCOUNTER — APPOINTMENT (OUTPATIENT)
Dept: RADIOLOGY | Facility: HOSPITAL | Age: 69
End: 2024-07-03
Attending: INTERNAL MEDICINE
Payer: MEDICARE

## 2024-07-03 PROCEDURE — 77080 DXA BONE DENSITY AXIAL: CPT | Mod: TC,HCNC

## 2024-07-03 PROCEDURE — 77080 DXA BONE DENSITY AXIAL: CPT | Mod: 26,HCNC,, | Performed by: RADIOLOGY

## 2024-07-03 NOTE — TELEPHONE ENCOUNTER
----- Message from Geri Alvarezabril sent at 7/1/2024  1:15 PM CDT -----  Contact: pt  Type:  Patient Returning Call    Who Called: pt  Who Left Message for Patient: PERRI  Does the patient know what this is regarding?:   Would the patient rather a call back or a response via [facility]?  phone  Best Call Back Number: [phone]  Additional Information:

## 2024-07-09 ENCOUNTER — HOSPITAL ENCOUNTER (OUTPATIENT)
Dept: RADIOLOGY | Facility: HOSPITAL | Age: 69
Discharge: HOME | End: 2024-07-09
Attending: PHYSICIAN ASSISTANT
Payer: MEDICARE

## 2024-07-09 ENCOUNTER — TELEPHONE (OUTPATIENT)
Dept: PRIMARY CARE CLINIC | Facility: CLINIC | Age: 69
End: 2024-07-09
Payer: MEDICARE

## 2024-07-09 ENCOUNTER — OFFICE VISIT (OUTPATIENT)
Dept: NEUROSURGERY | Facility: CLINIC | Age: 69
End: 2024-07-09
Payer: MEDICARE

## 2024-07-09 ENCOUNTER — E-CONSULT (OUTPATIENT)
Dept: GASTROENTEROLOGY | Facility: CLINIC | Age: 69
End: 2024-07-09
Payer: MEDICARE

## 2024-07-09 VITALS
DIASTOLIC BLOOD PRESSURE: 74 MMHG | HEART RATE: 90 BPM | WEIGHT: 156.75 LBS | BODY MASS INDEX: 28.67 KG/M2 | SYSTOLIC BLOOD PRESSURE: 131 MMHG

## 2024-07-09 DIAGNOSIS — M54.16 LUMBAR RADICULOPATHY: ICD-10-CM

## 2024-07-09 DIAGNOSIS — K43.9 VENTRAL HERNIA WITHOUT OBSTRUCTION OR GANGRENE: Primary | ICD-10-CM

## 2024-07-09 DIAGNOSIS — M54.6 PAIN IN THORACIC SPINE: ICD-10-CM

## 2024-07-09 DIAGNOSIS — R10.31 RIGHT LOWER QUADRANT ABDOMINAL PAIN: ICD-10-CM

## 2024-07-09 DIAGNOSIS — M54.6 PAIN IN THORACIC SPINE: Primary | ICD-10-CM

## 2024-07-09 DIAGNOSIS — R93.5 ABNORMAL FINDINGS ON DIAGNOSTIC IMAGING OF ABDOMEN: Primary | ICD-10-CM

## 2024-07-09 DIAGNOSIS — R11.0 NAUSEA: ICD-10-CM

## 2024-07-09 DIAGNOSIS — R10.11 RIGHT UPPER QUADRANT ABDOMINAL PAIN: ICD-10-CM

## 2024-07-09 DIAGNOSIS — M48.07 SPINAL STENOSIS OF LUMBOSACRAL REGION: ICD-10-CM

## 2024-07-09 DIAGNOSIS — E53.8 B12 DEFICIENCY: ICD-10-CM

## 2024-07-09 PROCEDURE — 99999 PR PBB SHADOW E&M-EST. PATIENT-LVL III: CPT | Mod: PBBFAC,HCNC,, | Performed by: PHYSICIAN ASSISTANT

## 2024-07-09 PROCEDURE — 72082 X-RAY EXAM ENTIRE SPI 2/3 VW: CPT | Mod: 26,HCNC,, | Performed by: RADIOLOGY

## 2024-07-09 PROCEDURE — 72082 X-RAY EXAM ENTIRE SPI 2/3 VW: CPT | Mod: TC,HCNC

## 2024-07-09 RX ORDER — CYANOCOBALAMIN 1000 UG/ML
1000 INJECTION, SOLUTION INTRAMUSCULAR; SUBCUTANEOUS WEEKLY
Status: SHIPPED | OUTPATIENT
Start: 2024-07-10 | End: 2024-08-07

## 2024-07-09 RX ORDER — TIZANIDINE 4 MG/1
4 TABLET ORAL EVERY 6 HOURS PRN
Qty: 30 TABLET | Refills: 0 | Status: SHIPPED | OUTPATIENT
Start: 2024-07-09 | End: 2024-07-19

## 2024-07-09 NOTE — PROGRESS NOTES
Spoke w pt - she is waiting to see NeuroSurg NP  Reviewed w her recent CT abd report  Ventral hernia more mid upper abdomen - it is getting larger but not tender at this area  C/o RLQ/R flank pain  Cont nausea relieved w anti-emetic - tolerating food and drink - no fever  She is in agreement w my sending e-consult to GI - understands she may have possible charge from specialist consulted  If pain intolerable, fever or not tolerating po she plans to go to ED

## 2024-07-09 NOTE — TELEPHONE ENCOUNTER
Spoke with pt and gave lab results and instructions.    SJ      ----- Message from Elis Wick MD sent at 7/9/2024  7:31 AM CDT -----  Pt has [facility] - if message below not viewed please call w information below:   Good day Ms. Damon    I'm sorry I haven't had a chance to call you about earlier imaging reports.  I'm working from home the rest of this week.  I can call you from personal cell if you wish.    Your mma level is elevated. This is another way to look for possible B12 deficiency because sometimes B12 level itself can be falsely elevated. I recommend short course of B12 injections once weekly then rechecking mma. If it has normalized at that time we can transition to either oral or sublingual B12 or monthly B12 injections. Let me know if you are ok with me ordering the weekly B12 injections and if you would want to come to clinic to have administered or have someone who can administer for you at home.     Please message or call with any questions or concerns!  Thank you!  Elis Wick MD, MPH  [facility]

## 2024-07-09 NOTE — CONSULTS
[facility]  Response for E-Consult     Patient Name: Nadia Damon  MRN: [id number]  Primary Care Provider: Elis Wick MD   Requesting Provider: Elis Wick MD  E-Consult to Gastroenterology  Consult performed by: Radha Rothman MD  Consult ordered by: Elis Wick MD  Reason for consult: Abnormal CT Abdomen        Recommendation: Agree with surgical evaluation of abnormal CT results to see if any intervention is needed        Total time of Consultation: 5 minute    I did not speak to the requesting provider verbally about this.     *This eConsult is based on the clinical data available to me and is furnished without benefit of a physical examination. The eConsult will need to be interpreted in light of any clinical issues or changes in patient status not available to me at the time of filing this eConsults. Significant changes in patient condition or level of acuity should result in immediate formal consultation and reevaluation. Please alert me if you have further questions.    Thank you for this eConsult referral.     Radha Rothman MD  [facility]

## 2024-07-10 ENCOUNTER — OFFICE VISIT (OUTPATIENT)
Dept: SURGERY | Facility: CLINIC | Age: 69
End: 2024-07-10
Payer: MEDICARE

## 2024-07-10 VITALS
TEMPERATURE: 97 F | DIASTOLIC BLOOD PRESSURE: 79 MMHG | WEIGHT: 156.31 LBS | BODY MASS INDEX: 28.59 KG/M2 | SYSTOLIC BLOOD PRESSURE: 138 MMHG | HEART RATE: 86 BPM

## 2024-07-10 DIAGNOSIS — R10.11 RIGHT UPPER QUADRANT ABDOMINAL PAIN: ICD-10-CM

## 2024-07-10 DIAGNOSIS — R10.31 RIGHT LOWER QUADRANT ABDOMINAL PAIN: ICD-10-CM

## 2024-07-10 DIAGNOSIS — K43.9 VENTRAL HERNIA WITHOUT OBSTRUCTION OR GANGRENE: Primary | ICD-10-CM

## 2024-07-10 PROCEDURE — 99999 PR PBB SHADOW E&M-EST. PATIENT-LVL IV: CPT | Mod: PBBFAC,HCNC,,

## 2024-07-10 PROCEDURE — 3075F SYST BP GE 130 - 139MM HG: CPT | Mod: HCNC,CPTII,S$GLB,

## 2024-07-10 PROCEDURE — 3078F DIAST BP <80 MM HG: CPT | Mod: HCNC,CPTII,S$GLB,

## 2024-07-10 PROCEDURE — 1159F MED LIST DOCD IN RCRD: CPT | Mod: HCNC,CPTII,S$GLB,

## 2024-07-10 PROCEDURE — 1160F RVW MEDS BY RX/DR IN RCRD: CPT | Mod: HCNC,CPTII,S$GLB,

## 2024-07-10 PROCEDURE — 1125F AMNT PAIN NOTED PAIN PRSNT: CPT | Mod: HCNC,CPTII,S$GLB,

## 2024-07-10 PROCEDURE — 99215 OFFICE O/P EST HI 40 MIN: CPT | Mod: HCNC,S$GLB,,

## 2024-07-10 PROCEDURE — 3008F BODY MASS INDEX DOCD: CPT | Mod: HCNC,CPTII,S$GLB,

## 2024-07-10 PROCEDURE — 1157F ADVNC CARE PLAN IN RCRD: CPT | Mod: HCNC,CPTII,S$GLB,

## 2024-07-10 PROCEDURE — 3066F NEPHROPATHY DOC TX: CPT | Mod: HCNC,CPTII,S$GLB,

## 2024-07-10 PROCEDURE — 3044F HG A1C LEVEL LT 7.0%: CPT | Mod: HCNC,CPTII,S$GLB,

## 2024-07-10 NOTE — PATIENT INSTRUCTIONS
Discussed that a minimum I would recommend behavioral, lifestyle and medication modifications to improve bowel habits. Usual bowel management handout given to patient. This includes a stool softener twice per day, fiber powder supplementation daily, drinking at least 64oz of water/day, avoiding straining with bowel movements, spending less than 5 min on toilet per bowel movement, eating a high fiber diet, using miralax as needed to achieve a bowel movement daily and using wet wipes to wipe after bowel movements when irritated.

## 2024-07-10 NOTE — PROGRESS NOTES
History & Physical    Subjective     History of Present Illness:  Patient is a 68 y.o. female who presents for evaluation of a ventral hernia. She is know to our department due to prior breast surgery, and her ventral hernia has been evaluated by Dr. Douglas in the past as well. It has increased in size on recent imaging as below. She reports undergoing sacral neurostimulator insertion with Dr. Cochran approximately one month ago. She had a fall since then, which brought her to the ED. It also caused onset of severe right lumbar back pain radiating down her right leg and into her right lower abdomen, which is her main complaint today. She reports some right upper quadrant abdominal pain and nausea as well. These symptoms have been present intermittently for years. She reports the pain can be exacerbated by eating. The pain is not related to movement. She has undergone nerve blocks with pain management in the past which seemed to improve this pain. There is not pain in the midline at the site of the hernia itself. She reports issues with constipation since her recent procedure. She takes fiber supplementation and Miralax daily, and her pain somewhat improves after a bowel movement. She denies any fevers or chills. She vomits on occasion related to her long standing issue with nausea. Her past abdominal surgical history includes robotic sacrocolpoplexy as well as access here for external cardiac pacer implantation. She also has a midline scar near her umbilicus, but she is not sure what this is from.  Of note, she also has a history of cardiac transplant.     Chief Complaint   Patient presents with    Hernia     Incisional hernia        Review of patient's allergies indicates:   Allergen Reactions    Lisinopril Swelling and Rash    Augmentin [amoxicillin-pot clavulanate] Diarrhea    Zyvox [linezolid] Nausea And Vomiting       Current Outpatient Medications   Medication Sig Dispense Refill    biotin 10,000 mcg Cap Take  1 tablet by mouth once daily.      calcitonin, salmon, (FORTICAL) 200 unit/actuation nasal spray USE 1 SPRAY DAILY IN ALTERNATE NOSTRIL 3 each 3    carvediloL (COREG) 6.25 MG tablet Take 1 tablet (6.25 mg total) by mouth 2 (two) times daily with meals. 180 tablet 3    cycloSPORINE modified, NEORAL, (NEORAL) 25 MG capsule Take 3 capsules (75 mg total) by mouth 2 (two) times daily. 540 capsule 1    ergocalciferol (VITAMIN D2) 50,000 unit Cap Take 1 capsule (50,000 Units total) by mouth every 14 (fourteen) days. 6 capsule 3    EVENING PRIMROSE OIL ORAL Take 1,000 mg by mouth once daily.      fluticasone propionate (FLONASE) 50 mcg/actuation nasal spray 2 sprays (100 mcg total) by Each Nostril route once daily. 16 g 1    furosemide (LASIX) 20 MG tablet TAKE 1 TABLET EVERY DAY 90 tablet 3    hydrALAZINE (APRESOLINE) 50 MG tablet Take 1 tablet (50 mg total) by mouth every 8 (eight) hours. 270 tablet 3    hydrocortisone 1 % cream APPLY TOPICALLY 2 (TWO) TIMES DAILY. 35 g 3    LIDOcaine (LIDODERM) 5 % PLACE 1 PATCH ONTO THE SKIN DAILY AS NEEDED (BACK PAIN). REMOVE AND DISCARD PATCH WITHIN 12 HOURS OR AS DIRECTED BY MD 60 patch 5    multivitamin capsule Take 1 capsule by mouth once daily.      NIFEdipine (PROCARDIA-XL) 30 MG (OSM) 24 hr tablet TAKE 1 TABLET ONE TIME DAILY (Patient taking differently: Take 30 mg by mouth once daily.) 90 tablet 3    ondansetron (ZOFRAN) 4 MG tablet Take 1 tablet (4 mg total) by mouth every 6 (six) hours as needed for Nausea. 16 tablet 0    oxyCODONE-acetaminophen (PERCOCET) 5-325 mg per tablet Take 1 tablet by mouth every 4 (four) hours as needed for Pain. 25 tablet 0    pantoprazole (PROTONIX) 20 MG tablet TAKE 1 TABLET ONE TIME DAILY 90 tablet 3    patiromer calcium sorbitex (VELTASSA) 8.4 gram PwPk Take 1 packet (8.4 g total) by mouth every other day. 15 packet 3    polyethylene glycol (GLYCOLAX) 17 gram PwPk Take 17 g by mouth once daily.      pregabalin (LYRICA) 50 MG capsule Take 1  capsule (50 mg total) by mouth 2 (two) times daily. 180 capsule 0    psyllium husk (METAMUCIL ORAL) Take 17 g by mouth once daily.      RETACRIT 20,000 unit/mL injection       temazepam (RESTORIL) 30 mg capsule Take 1 capsule (30 mg total) by mouth nightly as needed for Insomnia. 90 capsule 0    tiZANidine (ZANAFLEX) 4 MG tablet Take 1 tablet (4 mg total) by mouth every 6 (six) hours as needed. 30 tablet 0    UNABLE TO FIND medication name: Stool softener (Ducolax) Laxative      vitamin E 400 UNIT capsule Take 400 Units by mouth once daily.      zolpidem (AMBIEN) 5 MG Tab Take 1 tablet (5 mg total) by mouth nightly as needed (insomnia). 90 tablet 0    albuterol (VENTOLIN HFA) 90 mcg/actuation inhaler Inhale 2 puffs into the lungs every 6 (six) hours as needed for Wheezing or Shortness of Breath. Rescue 18 g 1     Current Facility-Administered Medications   Medication Dose Route Frequency Provider Last Rate Last Admin    acetaminophen tablet 1,000 mg  1,000 mg Oral On Call Procedure PRANAY Villalobos MD        cyanocobalamin injection 1,000 mcg  1,000 mcg Subcutaneous Weekly            Past Medical History:   Diagnosis Date    Abdominal wall hernia     CT Renal 6/11/2018---Small fat containing superior ventral abdominal wall hernia at the epicardial pacing lead site.    Abnormal mammogram 10/12/2021    Acute cystitis without hematuria 05/10/2022    GRACE (acute kidney injury) 11/22/2021    Anxiety     Arthritis     ZEN HIPS    Breast cancer in female 08/2021    LEFT BREAST    Bronchitis 08/18/2016    Never smoked      C. difficile colitis 11/29/2021    Cellulitis of axilla, left 12/23/2021    Chronic diastolic heart failure 12/16/2021    Chronic kidney disease     stage 4, GFR 15-29 ml/min    Chronic midline low back pain without sciatica 06/18/2018    Closed nondisplaced fracture of distal phalanx of left great toe with routine healing 10/22/2018    Coronary artery disease 1993    heart transplant    COVID-19 in  immunocompromised patient 02/26/2024    Cystitis 05/10/2022    Depression     Encounter for blood transfusion     Fibromyalgia     on Lyrica    Heart failure     native heart cardiomyopathy    Heart transplanted 1993    due to cardiomyopathy    History of hyperparathyroidism; Hyperparathyroidism, secondary renal     PT DENIES    Hypertension     Immune disorder     anti rejection meds    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    Impaired mobility 07/28/2022    Iron deficiency anemia 08/15/2017    Kidney stones     passed per pt    Obesity     Obesity (BMI 30.0-34.9) 07/22/2019    Other osteoporosis without current pathological fracture 08/30/2019    Other pancytopenia 05/06/2024    Parotitis, acute 09/19/2023    Pharyngitis 01/09/2019    Pneumonia due to infectious organism 03/14/2024    PONV (postoperative nausea and vomiting)     Severe sepsis 11/22/2021    Shingles 2003 approx    left leg    Subclinical hypothyroidism 06/16/2023    Thrombocytopenia, unspecified 11/29/2021    Trouble in sleeping     Urinary incontinence      Past Surgical History:   Procedure Laterality Date    bladder implant Right 06/11/2024    BLADDER SURGERY  2015 approx    mesh - Dr Everett then 2nd reconstructive sx Dr Onofre    BREAST BIOPSY Bilateral     NEGATIVE    BREAST BIOPSY Right 10/31/2022    benign    BREAST LUMPECTOMY Left 2021    BREAST SURGERY Left 09/28/2015    Bx - benign    BREAST SURGERY Right 12/2015    Bx benign    CARDIAC PACEMAKER REMOVAL Left 06/26/2014    Pacer defirillator removed. Put in 1993 aat time of heart transplant    CARPAL TUNNEL RELEASE Left 03/03/2015    Dr. Hall    COLONOSCOPY N/A 02/25/2021    Procedure: COLONOSCOPY;  Surgeon: Freida Ramirez MD;  Location: [facility];  Service: Endoscopy;  Laterality: N/A;    CYSTOCELE REPAIR      Twice with mesh removal    EPIDURAL STEROID INJECTION INTO CERVICAL SPINE N/A 02/02/2023    Procedure: T11/T12 IL HELLEN;  Surgeon: Jassi Pierre MD;   Location: [facility] PAIN [facility]GT;  Service: Pain Management;  Laterality: N/A;    HEART TRANSPLANT  1993    HERNIA REPAIR Right 1971 approx    Inguinal    HYSTERECTOMY  1983    vag hyst /LSO     IMPLANTATION OF PERMANENT SACRAL NERVE STIMULATOR N/A 06/11/2024    Procedure: INSERTION, NEUROSTIMULATOR, PERMANENT, SACRAL;  Surgeon: Sami Cochran MD;  Location: [facility] OR;  Service: Urology;  Laterality: N/A;    INCISION AND DRAINAGE OF ABSCESS Left 12/24/2021    Procedure: INCISION AND DRAINAGE, ABSCESS;  Surgeon: Joseph Longo MD;  Location: [facility] OR;  Service: General;  Laterality: Left;    INJECTION OF ANESTHETIC AGENT AROUND MEDIAL BRANCH NERVES INNERVATING LUMBAR FACET JOINT Right 10/19/2022    Procedure: Right L4/L5 and L5/S1 MBB;  Surgeon: Jassi Pierre MD;  Location: [facility];  Service: Pain Management;  Laterality: Right;    INJECTION OF ANESTHETIC AGENT AROUND MEDIAL BRANCH NERVES INNERVATING LUMBAR FACET JOINT Right 11/09/2022    Procedure: Right L4/L5 and L5/S1 MBB;  Surgeon: Jassi Pierre MD;  Location: [facility];  Service: Pain Management;  Laterality: Right;    INJECTION OF ANESTHETIC AGENT INTO SACROILIAC JOINT Right 08/22/2022    Procedure: Right SIJ Injection Right L5/S1 Facte Injection;  Surgeon: Jassi Pierre MD;  Location: [facility];  Service: Pain Management;  Laterality: Right;    INSERTION OF TUNNELED CENTRAL VENOUS CATHETER (CVC) WITH SUBCUTANEOUS PORT N/A 11/09/2021    Procedure: [id number];  Surgeon: Christoph Douglas MD;  Location: [facility] OR;  Service: General;  Laterality: N/A;    RADIOFREQUENCY THERMOCOAGULATION Right 12/07/2022    Procedure: Right L4/L5 and L5/S1 Lumbar RFA;  Surgeon: Jassi Pierre MD;  Location: [facility];  Service: Pain Management;  Laterality: Right;    REMOVAL OF VASCULAR ACCESS PORT      ROBOT-ASSISTED LAPAROSCOPIC ABDOMINAL SACROCOLPOPEXY N/A 08/10/2023    Procedure: ROBOTIC SACROCOLPOPEXY, ABDOMEN;  Surgeon: PRANAY Villalobos,  MD;  Location: [facility] OR;  Service: OB/GYN;  Laterality: N/A;    ROBOT-ASSISTED LAPAROSCOPIC OOPHORECTOMY Right 08/10/2023    Procedure: ROBOTIC OOPHORECTOMY;  Surgeon: PRANAY Villalobos MD;  Location: [facility] OR;  Service: OB/GYN;  Laterality: Right;    SENTINEL LYMPH NODE BIOPSY Left 10/12/2021    Procedure: BIOPSY, LYMPH NODE, SENTINEL;  Surgeon: Christoph Douglas MD;  Location: [facility] OR;  Service: General;  Laterality: Left;    TOE SURGERY      XI ROBOTIC URETHROPEXY N/A 08/10/2023    Procedure: XI ROBOTIC URETHROPEXY;  Surgeon: PRANAY Villalobos MD;  Location: [facility] OR;  Service: OB/GYN;  Laterality: N/A;     Family History   Problem Relation Name Age of Onset    Cancer Mother  38        breast    Breast cancer Mother      Breast cancer Maternal Grandmother      Heart disease Maternal Grandmother      Hypertension Son      Cataracts Cousin      Diabetes Neg Hx      Stroke Neg Hx      Kidney disease Neg Hx      Asthma Neg Hx      COPD Neg Hx      Melanoma Neg Hx      Hyperlipidemia Neg Hx       Social History     Tobacco Use    Smoking status: Never     Passive exposure: Never    Smokeless tobacco: Never   Substance Use Topics    Alcohol use: Never     Alcohol/week: 0.0 standard drinks of alcohol    Drug use: No        Review of Systems:  Review of Systems   Constitutional:  Negative for chills, fatigue, fever and unexpected weight change.   Respiratory:  Negative for cough, shortness of breath, wheezing and stridor.    Cardiovascular:  Negative for chest pain, palpitations and leg swelling.   Gastrointestinal:  Positive for abdominal pain, constipation and nausea. Negative for abdominal distention, anal bleeding, blood in stool, diarrhea and vomiting.   Genitourinary:  Negative for difficulty urinating, dysuria, frequency, hematuria and urgency.   Musculoskeletal:  Positive for arthralgias and back pain.   Skin:  Negative for color change, pallor, rash and wound.   Hematological:  Does not bruise/bleed easily.           Objective     Vital Signs (Most Recent)  Temp: 97.4 °F (36.3 °C) (07/10/24 1110)  Pulse: 86 (07/10/24 1110)  BP: 138/79 (07/10/24 1110)     70.9 kg (156 lb 4.9 oz)     Physical Exam:  Physical Exam  Vitals reviewed.   Constitutional:       General: She is not in acute distress.     Appearance: She is well-developed. She is not ill-appearing or diaphoretic.   HENT:      Head: Normocephalic and atraumatic.      Right Ear: External ear normal.      Left Ear: External ear normal.   Eyes:      General: No scleral icterus.     Extraocular Movements: Extraocular movements intact.      Conjunctiva/sclera: Conjunctivae normal.   Neck:      Thyroid: No thyromegaly.      Trachea: No tracheal deviation.   Cardiovascular:      Rate and Rhythm: Normal rate.   Pulmonary:      Effort: Pulmonary effort is normal. No respiratory distress.   Chest:   Breasts:     Right: No inverted nipple, mass, nipple discharge, skin change or tenderness.      Left: No inverted nipple, mass, nipple discharge, skin change or tenderness.   Abdominal:          Comments: Abdomen is soft and nondistended on exam today.  Moderate right upper quadrant tenderness to palpation.    Musculoskeletal:         General: No tenderness or deformity. Normal range of motion.      Cervical back: Normal range of motion.   Skin:     General: Skin is warm and dry.      Coloration: Skin is not pale.      Findings: No erythema or rash.   Neurological:      Mental Status: She is alert and oriented to person, place, and time.   Psychiatric:         Behavior: Behavior normal.         Thought Content: Thought content normal.         Judgment: Judgment normal.         Laboratory  Lab Results   Component Value Date    WBC 2.99 (L) 07/02/2024    HGB 9.1 (L) 07/02/2024    HCT 28.7 (L) 07/02/2024    MCV 89 07/02/2024     07/02/2024     CMP  Sodium   Date Value Ref Range Status   07/02/2024 140 136 - 145 mmol/L Final     Potassium   Date Value Ref Range Status    07/02/2024 3.6 3.5 - 5.1 mmol/L Final     Chloride   Date Value Ref Range Status   07/02/2024 106 95 - 110 mmol/L Final     CO2   Date Value Ref Range Status   07/02/2024 20 (L) 23 - 29 mmol/L Final     Glucose   Date Value Ref Range Status   07/02/2024 61 (L) 70 - 110 mg/dL Final     BUN   Date Value Ref Range Status   07/02/2024 49 (H) 8 - 23 mg/dL Final     Creatinine   Date Value Ref Range Status   07/02/2024 2.9 (H) 0.5 - 1.4 mg/dL Final     Calcium   Date Value Ref Range Status   07/02/2024 8.8 8.7 - 10.5 mg/dL Final     Total Protein   Date Value Ref Range Status   07/02/2024 8.1 6.0 - 8.4 g/dL Final     Albumin   Date Value Ref Range Status   07/02/2024 3.4 (L) 3.5 - 5.2 g/dL Final     Total Bilirubin   Date Value Ref Range Status   07/02/2024 0.3 0.1 - 1.0 mg/dL Final     Comment:     For infants and newborns, interpretation of results should be based  on gestational age, weight and in agreement with clinical  observations.    Premature Infant recommended reference ranges:  Up to 24 hours.............<8.0 mg/dL  Up to 48 hours............<12.0 mg/dL  3-5 days..................<15.0 mg/dL  6-29 days.................<15.0 mg/dL       Alkaline Phosphatase   Date Value Ref Range Status   07/02/2024 139 (H) 55 - 135 U/L Final     AST   Date Value Ref Range Status   07/02/2024 41 (H) 10 - 40 U/L Final     ALT   Date Value Ref Range Status   07/02/2024 30 10 - 44 U/L Final     Anion Gap   Date Value Ref Range Status   07/02/2024 14 8 - 16 mmol/L Final     eGFR   Date Value Ref Range Status   07/02/2024 17 (A) >60 mL/min/1.73 m^2 Final           Diagnostic Results:  CT abdomen pelvis:  Impression:     The wires of an external cardiac pacer are visible in the left lower chest and upper abdomen.  A herniation is visible in the upper abdominal wall likely associated with the pacer and herniation of a portion of the left hepatic lobe into the hernia sac has increased since 01/04/2023.     Status post hysterectomy.      Mild, uncomplicated sigmoid colon diverticulosis.     An indeterminate implanted device is visible with an electrode extending through sacral foramina on the right into the right presacral space.       Assessment and Plan   68-year-old female with a ventral hernia containing liver and external cardiac pacer wires.    - discussed anatomy regarding hernias to the patient.  - discussed that of her many symptoms she complains of today, low likelihood that most of the symptoms would be resolved with ventral hernia repair.  Explained that, of course, radicular back pain is not related to the hernia.  Right upper quadrant abdominal pain does not seem likely to be related to this either, as hernias typically hurt directly where they are located.  She is nontender in the area of the hernia on examination.  Nausea is also unlikely to be related to the hernia as the hernia does not contain any bowel.  - abdominal pain may be related to constipation.  Encouraged increased dietary fiber intake, continued fiber supplementation, stool softener, greater than 64 oz of water per day, and laxatives as needed.  - encouraged patient to fully recover from recent procedure and resolve back issues prior to pursuing any kind of surgical intervention for the hernia.  Discussed that this has not in need of urgent repair.  Discussed that as this hernia contains liver it is unlikely to become strangulated and an urgent issue unless it were to develop into containing bowel.  Discussed signs and symptoms of hernia incarceration and strangulation.  - discussed that the nature of hernias is to enlarge over time, which has evidently happened since her last imaging prior to the above CT scan.  - briefly discussed possible surgical approaches for ventral hernia and surgical recovery time.  - return to clinic 2-3 months after recovered from recent procedure to re-evaluate and further discuss.    Linda Crouch PA-C  [facility]      I  spent a total of 60 minutes on the day of the visit.This includes face to face time and non-face to face time preparing to see the patient (eg, review of tests), obtaining and/or reviewing separately obtained history, documenting clinical information in the electronic or other health record, independently interpreting results and communicating results to the patient/family/caregiver, or care coordinator.

## 2024-07-11 ENCOUNTER — OFFICE VISIT (OUTPATIENT)
Dept: RHEUMATOLOGY | Facility: CLINIC | Age: 69
End: 2024-07-11
Payer: MEDICARE

## 2024-07-11 VITALS
HEIGHT: 62 IN | BODY MASS INDEX: 28.76 KG/M2 | HEART RATE: 82 BPM | DIASTOLIC BLOOD PRESSURE: 78 MMHG | WEIGHT: 156.31 LBS | SYSTOLIC BLOOD PRESSURE: 134 MMHG

## 2024-07-11 DIAGNOSIS — E83.51 HYPOCALCEMIA: ICD-10-CM

## 2024-07-11 DIAGNOSIS — Z51.81 MEDICATION MONITORING ENCOUNTER: ICD-10-CM

## 2024-07-11 DIAGNOSIS — Z87.442 HISTORY OF NEPHROLITHIASIS: ICD-10-CM

## 2024-07-11 DIAGNOSIS — M81.8 OTHER OSTEOPOROSIS WITHOUT CURRENT PATHOLOGICAL FRACTURE: Primary | Chronic | ICD-10-CM

## 2024-07-11 DIAGNOSIS — E79.0 HYPERURICEMIA WITHOUT SIGNS INFLAMMATORY ARTHRITIS/TOPHACEOUS DISEASE: ICD-10-CM

## 2024-07-11 DIAGNOSIS — Z94.1 HEART TRANSPLANTED: Chronic | ICD-10-CM

## 2024-07-11 DIAGNOSIS — E21.3 HYPERPARATHYROIDISM: ICD-10-CM

## 2024-07-11 DIAGNOSIS — N18.4 CKD (CHRONIC KIDNEY DISEASE) STAGE 4, GFR 15-29 ML/MIN: Chronic | ICD-10-CM

## 2024-07-11 PROCEDURE — 99999 PR PBB SHADOW E&M-EST. PATIENT-LVL III: CPT | Mod: PBBFAC,HCNC,, | Performed by: INTERNAL MEDICINE

## 2024-07-11 RX ORDER — RALOXIFENE HYDROCHLORIDE 60 MG/1
60 TABLET, FILM COATED ORAL DAILY
Qty: 30 TABLET | Refills: 11 | Status: SHIPPED | OUTPATIENT
Start: 2024-07-11 | End: 2025-07-11

## 2024-07-11 NOTE — PROGRESS NOTES
Patient is 69 yo F who has complex medical hx, including heart transplant, CKD. HTN. HLD, atherosclerosis, who presents to my clinic today for evaluation of her mid back pain.    Pain is worse on the right sided.    She endorses difficulty ambulating due to pain and weakness in her legs.      She recently had a bladder stimulator procedure performed.       States she is able to undergo MRI if she turns it off.      She is ambulatory but states it is difficult to stand or walk prolonged periods of time      She is accompanied by a friend today.       Pertinent positive and negative ROS documented above in HPI, all other systems reviewed and found to be negative.         Physical Exam:  Nursing note and vitals reviewed.     Constitutional: She appears well-nourished. She is not diaphoretic. No distress.      Eyes: Pupils are equal, round, and reactive to light. EOM are normal.      Cardiovascular: Normal rate and regular rhythm.      Psych/Behavior: She is alert. She is oriented to person, place, and time. She has a normal mood and affect.      Musculoskeletal:        Back: Range of motion is limited. There is tenderness. Muscle strength is 5/5.       Right Lower Extremities: Range of motion is full. There is no tenderness. Muscle strength is 5/5. Tone is normal.        Left Lower Extremities: There is no tenderness. Muscle strength is 5/5. Tone is normal.      Neurological:        Sensory: There is no sensory deficit in the trunk. There is no sensory deficit in the extremities.        Cranial nerves: Cranial nerve(s) II, III, IV, V, VI, VII, VIII, IX, X, XI and XII are intact.      General     Nursing note and vitals reviewed.  Constitutional: She is oriented to person, place, and time. She appears well-nourished. No distress.   Eyes: EOM are normal. Pupils are equal, round, and reactive to light.   Cardiovascular:  Normal rate and regular rhythm.            Neurological: She is alert and oriented to person, place,  and time.   Psychiatric: She has a normal mood and affect.          EXAMINATION:  CT LUMBAR SPINE WITHOUT CONTRAST     CLINICAL HISTORY:  Low back pain, trauma;     TECHNIQUE:  Low-dose axial, sagittal and coronal reformations are obtained through the lumbar spine.  Contrast was not administered.     COMPARISON:  Plain film of the lumbosacral spine performed 12/01/2022     FINDINGS:  Alignment is normal.  There is focally advanced degenerative disc space narrowing and osteophyte formation at T11-12.  Remaining disc spaces and vertebral body heights are maintained.     Axial images demonstrate no significant abnormality at T10-T11.  At T11-12, a large right posterolateral osteophyte and facet hypertrophy produces severe right foraminal stenosis and narrowing of the right lateral recess.  The nerve root exit on the left is patent.     No significant abnormalities are visible at T12-L1 through L4-5.     At L5-S1, there is a small left paracentral to lateral disc herniation which mildly narrows the left lateral recess.     Impression:     Advanced focal disc degeneration at T11-12 which may be associated with a remote injury.  Right posterolateral osteophyte formation narrows the right lateral recess and contributes to severe right foraminal stenosis at this level.     Small left paracentral to lateral disc herniation at L5-S1 mildly impinging on the left lateral recess.        Electronically signed by:Ori Preston  Date:                                            06/27/2024  Time:                                           16:15      Patient is 67 yo F with worsening back pain with radiculopathy into her right flank and right buttock. I reviewed her CT which shows degeneration of T11-T12 disc ? Potenttially a remote injury. There is right sided osteophyte formation narrowing right lateral recess and causing right SEVERE foraminal stenosis.       Addl patient has left sided disc herniation at L5-S1.         I would  like to evaluate her spine with and without contrast, however given extent of kidney dysfunction will do Throacic and lumbar MRI without contrast          Zanaflex prn pain      X rays      Follow up after imaging      Attestation:  IBelkis PA-C have obtained HPI, performed Physical Examination on the above patient, reviewed the pertinent labs, tests, imaging, other relevant data and recorded my findings in this clinic note.

## 2024-07-11 NOTE — PROGRESS NOTES
RHEUMATOLOGY OUTPATIENT CLINIC NOTE    7/11/2024    Attending Rheumatologist: Prasanth Johnson  Primary Care Provider/Physician Requesting Consultation: Elis Wick MD   Chief Complaint/Reason For Consultation:  Osteoporosis, Osteoarthritis, and Fibromyalgia      Subjective:     Nadia Damon is a 68 y.o. Black or  female with OP    Interval discontinuation of nasal calcitonin by Nephrology (recurrent hypocalcemia).  No acute complaints at present.  Landen fractures since last visit.  Not currently scheduled for invasive dental w/u.    Review of Systems   Constitutional:  Negative for fever.   Eyes:  Negative for photophobia and pain.   Cardiovascular:         Denies hx of MI   Gastrointestinal:  Negative for blood in stool and melena.   Genitourinary:  Negative for dysuria, hematuria and urgency.   Musculoskeletal:  Positive for back pain and joint pain. Negative for falls.   Skin:  Negative for rash.   Neurological:         Denies hx of CVA   Endo/Heme/Allergies:         Denies hx of DVT/PE       Chronic comorbid conditions affecting medical decision making today:  Past Medical History:   Diagnosis Date    Abdominal wall hernia     CT Renal 6/11/2018---Small fat containing superior ventral abdominal wall hernia at the epicardial pacing lead site.    Abnormal mammogram 10/12/2021    Acute cystitis without hematuria 05/10/2022    GRACE (acute kidney injury) 11/22/2021    Anxiety     Arthritis     ZEN HIPS    Breast cancer in female 08/2021    LEFT BREAST    Bronchitis 08/18/2016    Never smoked      C. difficile colitis 11/29/2021    Cellulitis of axilla, left 12/23/2021    Chronic diastolic heart failure 12/16/2021    Chronic kidney disease     stage 4, GFR 15-29 ml/min    Chronic midline low back pain without sciatica 06/18/2018    Closed nondisplaced fracture of distal phalanx of left great toe with routine healing 10/22/2018    Coronary artery disease 1993    heart transplant     COVID-19 in immunocompromised patient 02/26/2024    Cystitis 05/10/2022    Depression     Encounter for blood transfusion     Fibromyalgia     on Lyrica    Heart failure     native heart cardiomyopathy    Heart transplanted 1993    due to cardiomyopathy    History of hyperparathyroidism; Hyperparathyroidism, secondary renal     PT DENIES    Hypertension     Immune disorder     anti rejection meds    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    Impaired mobility 07/28/2022    Iron deficiency anemia 08/15/2017    Kidney stones     passed per pt    Obesity     Obesity (BMI 30.0-34.9) 07/22/2019    Other osteoporosis without current pathological fracture 08/30/2019    Other pancytopenia 05/06/2024    Parotitis, acute 09/19/2023    Pharyngitis 01/09/2019    Pneumonia due to infectious organism 03/14/2024    PONV (postoperative nausea and vomiting)     Severe sepsis 11/22/2021    Shingles 2003 approx    left leg    Subclinical hypothyroidism 06/16/2023    Thrombocytopenia, unspecified 11/29/2021    Trouble in sleeping     Urinary incontinence      Past Surgical History:   Procedure Laterality Date    bladder implant Right 06/11/2024    BLADDER SURGERY  2015 approx    mesh - Dr Everett then 2nd reconstructive sx Dr Onofre    BREAST BIOPSY Bilateral     NEGATIVE    BREAST BIOPSY Right 10/31/2022    benign    BREAST LUMPECTOMY Left 2021    BREAST SURGERY Left 09/28/2015    Bx - benign    BREAST SURGERY Right 12/2015    Bx benign    CARDIAC PACEMAKER REMOVAL Left 06/26/2014    Pacer defirillator removed. Put in 1993 aat time of heart transplant    CARPAL TUNNEL RELEASE Left 03/03/2015    Dr. Hall    COLONOSCOPY N/A 02/25/2021    Procedure: COLONOSCOPY;  Surgeon: Freida Ramirez MD;  Location: [facility];  Service: Endoscopy;  Laterality: N/A;    CYSTOCELE REPAIR      Twice with mesh removal    EPIDURAL STEROID INJECTION INTO CERVICAL SPINE N/A 02/02/2023    Procedure: T11/T12 IL HELLEN;  Surgeon: Jassi  MD Ignacio;  Location: [facility] PAIN MGT;  Service: Pain Management;  Laterality: N/A;    HEART TRANSPLANT  1993    HERNIA REPAIR Right 1971 approx    Inguinal    HYSTERECTOMY  1983    vag hyst /LSO     IMPLANTATION OF PERMANENT SACRAL NERVE STIMULATOR N/A 06/11/2024    Procedure: INSERTION, NEUROSTIMULATOR, PERMANENT, SACRAL;  Surgeon: Sami Cochran MD;  Location: [facility] OR;  Service: Urology;  Laterality: N/A;    INCISION AND DRAINAGE OF ABSCESS Left 12/24/2021    Procedure: INCISION AND DRAINAGE, ABSCESS;  Surgeon: Joseph Longo MD;  Location: [facility] OR;  Service: General;  Laterality: Left;    INJECTION OF ANESTHETIC AGENT AROUND MEDIAL BRANCH NERVES INNERVATING LUMBAR FACET JOINT Right 10/19/2022    Procedure: Right L4/L5 and L5/S1 MBB;  Surgeon: Jassi Pierre MD;  Location: [facility] PAIN [facility]GT;  Service: Pain Management;  Laterality: Right;    INJECTION OF ANESTHETIC AGENT AROUND MEDIAL BRANCH NERVES INNERVATING LUMBAR FACET JOINT Right 11/09/2022    Procedure: Right L4/L5 and L5/S1 MBB;  Surgeon: Jassi Pierre MD;  Location: [facility] PAIN [facility]GT;  Service: Pain Management;  Laterality: Right;    INJECTION OF ANESTHETIC AGENT INTO SACROILIAC JOINT Right 08/22/2022    Procedure: Right SIJ Injection Right L5/S1 Facte Injection;  Surgeon: Jassi Pierre MD;  Location: [facility]IN [facility]GT;  Service: Pain Management;  Laterality: Right;    INSERTION OF TUNNELED CENTRAL VENOUS CATHETER (CVC) WITH SUBCUTANEOUS PORT N/A 11/09/2021    Procedure: [id number];  Surgeon: Christoph Douglas MD;  Location: [facility] OR;  Service: General;  Laterality: N/A;    RADIOFREQUENCY THERMOCOAGULATION Right 12/07/2022    Procedure: Right L4/L5 and L5/S1 Lumbar RFA;  Surgeon: Jassi Pierre MD;  Location: [facility] PAIN [facility]GT;  Service: Pain Management;  Laterality: Right;    REMOVAL OF VASCULAR ACCESS PORT      ROBOT-ASSISTED LAPAROSCOPIC ABDOMINAL SACROCOLPOPEXY N/A 08/10/2023    Procedure: ROBOTIC SACROCOLPOPEXY, ABDOMEN;  Surgeon:  PRANAY Villalobos MD;  Location: [facility] OR;  Service: OB/GYN;  Laterality: N/A;    ROBOT-ASSISTED LAPAROSCOPIC OOPHORECTOMY Right 08/10/2023    Procedure: ROBOTIC OOPHORECTOMY;  Surgeon: PRANAY Villalobos MD;  Location: [facility] OR;  Service: OB/GYN;  Laterality: Right;    SENTINEL LYMPH NODE BIOPSY Left 10/12/2021    Procedure: BIOPSY, LYMPH NODE, SENTINEL;  Surgeon: Christoph Douglas MD;  Location: [facility] OR;  Service: General;  Laterality: Left;    TOE SURGERY      XI ROBOTIC URETHROPEXY N/A 08/10/2023    Procedure: XI ROBOTIC URETHROPEXY;  Surgeon: PRANAY Villalobos MD;  Location: [facility] OR;  Service: OB/GYN;  Laterality: N/A;     Family History   Problem Relation Name Age of Onset    Cancer Mother  38        breast    Breast cancer Mother      Breast cancer Maternal Grandmother      Heart disease Maternal Grandmother      Hypertension Son      Cataracts Cousin      Diabetes Neg Hx      Stroke Neg Hx      Kidney disease Neg Hx      Asthma Neg Hx      COPD Neg Hx      Melanoma Neg Hx      Hyperlipidemia Neg Hx       Social History     Tobacco Use   Smoking Status Never    Passive exposure: Never   Smokeless Tobacco Never       Current Outpatient Medications:     biotin 10,000 mcg Cap, Take 1 tablet by mouth once daily., Disp: , Rfl:     carvediloL (COREG) 6.25 MG tablet, Take 1 tablet (6.25 mg total) by mouth 2 (two) times daily with meals., Disp: 180 tablet, Rfl: 3    cycloSPORINE modified, NEORAL, (NEORAL) 25 MG capsule, Take 3 capsules (75 mg total) by mouth 2 (two) times daily., Disp: 540 capsule, Rfl: 1    ergocalciferol (VITAMIN D2) 50,000 unit Cap, Take 1 capsule (50,000 Units total) by mouth every 14 (fourteen) days., Disp: 6 capsule, Rfl: 3    EVENING PRIMROSE OIL ORAL, Take 1,000 mg by mouth once daily., Disp: , Rfl:     fluticasone propionate (FLONASE) 50 mcg/actuation nasal spray, 2 sprays (100 mcg total) by Each Nostril route once daily., Disp: 16 g, Rfl: 1    furosemide (LASIX) 20 MG tablet, TAKE 1  TABLET EVERY DAY, Disp: 90 tablet, Rfl: 3    hydrALAZINE (APRESOLINE) 50 MG tablet, Take 1 tablet (50 mg total) by mouth every 8 (eight) hours., Disp: 270 tablet, Rfl: 3    hydrocortisone 1 % cream, APPLY TOPICALLY 2 (TWO) TIMES DAILY., Disp: 35 g, Rfl: 3    LIDOcaine (LIDODERM) 5 %, PLACE 1 PATCH ONTO THE SKIN DAILY AS NEEDED (BACK PAIN). REMOVE AND DISCARD PATCH WITHIN 12 HOURS OR AS DIRECTED BY MD, Disp: 60 patch, Rfl: 5    multivitamin capsule, Take 1 capsule by mouth once daily., Disp: , Rfl:     NIFEdipine (PROCARDIA-XL) 30 MG (OSM) 24 hr tablet, TAKE 1 TABLET ONE TIME DAILY (Patient taking differently: Take 30 mg by mouth once daily.), Disp: 90 tablet, Rfl: 3    ondansetron (ZOFRAN) 4 MG tablet, Take 1 tablet (4 mg total) by mouth every 6 (six) hours as needed for Nausea., Disp: 16 tablet, Rfl: 0    oxyCODONE-acetaminophen (PERCOCET) 5-325 mg per tablet, Take 1 tablet by mouth every 4 (four) hours as needed for Pain., Disp: 25 tablet, Rfl: 0    pantoprazole (PROTONIX) 20 MG tablet, TAKE 1 TABLET ONE TIME DAILY, Disp: 90 tablet, Rfl: 3    patiromer calcium sorbitex (VELTASSA) 8.4 gram PwPk, Take 1 packet (8.4 g total) by mouth every other day., Disp: 15 packet, Rfl: 3    polyethylene glycol (GLYCOLAX) 17 gram PwPk, Take 17 g by mouth once daily., Disp: , Rfl:     pregabalin (LYRICA) 50 MG capsule, Take 1 capsule (50 mg total) by mouth 2 (two) times daily., Disp: 180 capsule, Rfl: 0    psyllium husk (METAMUCIL ORAL), Take 17 g by mouth once daily., Disp: , Rfl:     RETACRIT 20,000 unit/mL injection, , Disp: , Rfl:     temazepam (RESTORIL) 30 mg capsule, Take 1 capsule (30 mg total) by mouth nightly as needed for Insomnia., Disp: 90 capsule, Rfl: 0    tiZANidine (ZANAFLEX) 4 MG tablet, Take 1 tablet (4 mg total) by mouth every 6 (six) hours as needed., Disp: 30 tablet, Rfl: 0    UNABLE TO FIND, medication name: Stool softener (Ducolax) Laxative, Disp: , Rfl:     vitamin E 400 UNIT capsule, Take 400 Units by  mouth once daily., Disp: , Rfl:     zolpidem (AMBIEN) 5 MG Tab, Take 1 tablet (5 mg total) by mouth nightly as needed (insomnia)., Disp: 90 tablet, Rfl: 0    albuterol (VENTOLIN HFA) 90 mcg/actuation inhaler, Inhale 2 puffs into the lungs every 6 (six) hours as needed for Wheezing or Shortness of Breath. Rescue, Disp: 18 g, Rfl: 1    calcitonin, salmon, (FORTICAL) 200 unit/actuation nasal spray, USE 1 SPRAY DAILY IN ALTERNATE NOSTRIL (Patient not taking: Reported on 7/11/2024), Disp: 3 each, Rfl: 3    Current Facility-Administered Medications:     acetaminophen tablet 1,000 mg, 1,000 mg, Oral, On Call Procedure, PRANAY Villalobos MD    cyanocobalamin injection 1,000 mcg, 1,000 mcg, Subcutaneous, Weekly,      Objective:     Vitals:    07/11/24 1243   BP: 134/78   Pulse: 82     Physical Exam   Eyes: Conjunctivae are normal.   Pulmonary/Chest: Effort normal. No respiratory distress.   Musculoskeletal:         General: No swelling.   Skin: No rash noted.       Reviewed available old and all outside pertinent medical records available.    All lab results personally reviewed and interpreted by me.       ASSESSMENT / PLAN     1. Other osteoporosis without current pathological fracture  Recurrent hypocalcemia while on nasal calcitonin, interval discontinuation by nephrology.  Repeat DXA w/ osteoporotic score at the hip.  At risk of hypocalcemia w/ any bone antiresorptive agent.  Patient amenable to trial of Evista.  Will provide refills if no absolute CI from transplant clinic; and no recurrent hypocalcemia  raloxifene (EVISTA) 60 mg tablet      2. Hyperuricemia without signs inflammatory arthritis/tophaceous disease  No rheumatic indication for ULT unless develops gout.  Low purine diet.      3. Hypocalcemia  Comprehensive Metabolic Panel  Suggest Ca supp OTC while on Evista      4. Medication monitoring encounter  Comprehensive Metabolic Panel      5. History of nephrolithiasis  Avoid hypervitaminosis D.      6.  Hyperparathyroidism  Questionable primary and secondary hyperparathyroidism.  On vit D supplement per Nephrology      7. CKD (chronic kidney disease) stage 4, GFR 15-29 ml/min  Risk factor for hyperuricemia  Monitor for hypocalcemia while on bone antiresorptive      8. Heart transplanted  Suggest clearance to proceed with Evista for OP by transplant clinic.                Prasanth Johnson M.D.

## 2024-07-12 ENCOUNTER — OFFICE VISIT (OUTPATIENT)
Dept: UROLOGY | Facility: CLINIC | Age: 69
End: 2024-07-12
Payer: MEDICARE

## 2024-07-12 ENCOUNTER — TELEPHONE (OUTPATIENT)
Dept: PRIMARY CARE CLINIC | Facility: CLINIC | Age: 69
End: 2024-07-12
Payer: MEDICARE

## 2024-07-12 VITALS
HEIGHT: 62 IN | BODY MASS INDEX: 28.76 KG/M2 | SYSTOLIC BLOOD PRESSURE: 160 MMHG | WEIGHT: 156.31 LBS | DIASTOLIC BLOOD PRESSURE: 82 MMHG | HEART RATE: 88 BPM | RESPIRATION RATE: 16 BRPM

## 2024-07-12 DIAGNOSIS — N39.41 URGE INCONTINENCE OF URINE: Primary | ICD-10-CM

## 2024-07-12 DIAGNOSIS — N39.3 SUI (STRESS URINARY INCONTINENCE, FEMALE): ICD-10-CM

## 2024-07-12 DIAGNOSIS — N99.3 PROLAPSE OF VAGINAL CUFF AFTER HYSTERECTOMY: ICD-10-CM

## 2024-07-12 DIAGNOSIS — N30.00 ACUTE CYSTITIS WITHOUT HEMATURIA: ICD-10-CM

## 2024-07-12 PROCEDURE — 99999 PR PBB SHADOW E&M-EST. PATIENT-LVL III: CPT | Mod: PBBFAC,HCNC,, | Performed by: UROLOGY

## 2024-07-12 NOTE — TELEPHONE ENCOUNTER
----- Message from Elis Wick MD sent at 7/5/2024  1:19 PM CDT -----  Regarding: RE: Lab results  Please let her know that overall her lipid levels are actually quite good - just the trigylcerides were elevated.  Was she fasting when these labs were drawn?  If she had eaten breakfast prior to doing labs the trigylcerides WILL be high.  If she is concerned, we can repeat lipid panel later this summer and make sure its done fasting  ----- Message -----  From: Abad Canales LPN  Sent: 7/5/2024  12:34 PM CDT  To: Elis Wick MD  Subject: Lab results                                      Called and informed patient of lab results per order. Patient stated she would like to discuss the result of her lipid panel concerned about cholesterol levels.  ----- Message -----  From: Elis Wick MD  Sent: 7/4/2024   8:48 AM CDT  To: [facility] Staff    [facility] has [facility] - if message below not viewed please call w information below:     Thyroid levels are stable. Sometimes biotin can affect the thyroid function test results..   Recommend HOLD biotin x 2 weeks prior to labdraw when/if we recheck thyroid levels.    Please message or call with any questions or concerns!  Thank you!  Elis Wick MD, MPH  [facility]

## 2024-07-12 NOTE — TELEPHONE ENCOUNTER
----- Message from Elis Wick MD sent at 7/5/2024  1:19 PM CDT -----  Regarding: RE: Lab results  Please let her know that overall her lipid levels are actually quite good - just the trigylcerides were elevated.  Was she fasting when these labs were drawn?  If she had eaten breakfast prior to doing labs the trigylcerides WILL be high.  If she is concerned, we can repeat lipid panel later this summer and make sure its done fasting  ----- Message -----  From: bAad Canales LPN  Sent: 7/5/2024  12:34 PM CDT  To: Elis Wick MD  Subject: Lab results                                      Called and informed patient of lab results per order. Patient stated she would like to discuss the result of her lipid panel concerned about cholesterol levels.  ----- Message -----  From: Elis Wick MD  Sent: 7/4/2024   8:48 AM CDT  To: [facility] Staff    Pt has [facility] - if message below not viewed please call w information below:     Thyroid levels are stable. Sometimes biotin can affect the thyroid function test results..   Recommend HOLD biotin x 2 weeks prior to labdraw when/if we recheck thyroid levels.    Please message or call with any questions or concerns!  Thank you!  Elis Wick MD, MPH  [facility]

## 2024-07-12 NOTE — TELEPHONE ENCOUNTER
Called and informed patient of Lipid panel results per orders, patient stated she did not fast before labs, took medication and had 1 glass of milk. Patient verbalized understanding.

## 2024-07-12 NOTE — PROGRESS NOTES
Chief Complaint:   Encounter Diagnoses   Name Primary?    Urge incontinence of urine Yes    FRANKY (stress urinary incontinence, female)     Acute cystitis without hematuria     Prolapse of vaginal cuff after hysterectomy          HPI:   7/12/24- patient had a significant fall which is being evaluated, no change to her InterStim.  Currently appears to be functioning well, no evidence acute cystitis or incontinence.    5/1/24- patient is here today for the 1st time to see me from [place], she is here today to discuss surgical management for urge incontinence as she has failed medical management.  No specific evidence of stress incontinence or UTI.    11/29/23- LR- Patient is a 68-year-old female that is presenting as a follow-up to overactive bladder.  Patient has urge incontinence and is wearing a pad that she changes several times a day.  She was prescribed Myrbetriq, however, was hospitalized secondary to an increase in potassium.  Patient reports that primary care provider discontinued Myrbetriq secondary to possible interaction related to increase potassium level.  Patient states that medication decreasing her urge incontinence and daytime frequency.  Is requesting a new medication or treatment option.       Allergies:  Lisinopril, Augmentin [amoxicillin-pot clavulanate], and Zyvox [linezolid]    Medications:  has a current medication list which includes the following prescription(s): albuterol, benzonatate, biotin, calcitonin (salmon), carvedilol, cyclosporine modified (neoral), diazepam, ergocalciferol, evening primrose oil, fluticasone propionate, furosemide, hydralazine, hydrocortisone, lidocaine, multivitamin, nifedipine, pantoprazole, patiromer calcium sorbitex, polyethylene glycol, pregabalin, psyllium husk, retacrit, temazepam, UNABLE TO FIND, vitamin e, and zolpidem, and the following Facility-Administered Medications: acetaminophen and famotidine.    Review of Systems:  General: No fever, chills,  fatigability, or weight loss.  Skin: No rashes, itching, or changes in color or texture of skin.  Chest: Denies DONOHUE, cyanosis, wheezing, cough, and sputum production.  Abdomen: Appetite fine. No weight loss. Denies diarrhea, abdominal pain, hematemesis, or blood in stool.  Musculoskeletal: No joint stiffness or swelling. Denies back pain.  : As above.  All other review of systems negative.    PMH:   has a past medical history of Abdominal wall hernia, Abnormal mammogram (10/12/2021), GRACE (acute kidney injury) (11/22/2021), Anxiety, Arthritis, Breast cancer in female (08/2021), Bronchitis (08/18/2016), C. difficile colitis (11/29/2021), Cellulitis of axilla, left (12/23/2021), Chronic diastolic heart failure (12/16/2021), Chronic kidney disease, Chronic midline low back pain without sciatica (06/18/2018), Closed nondisplaced fracture of distal phalanx of left great toe with routine healing (10/22/2018), Coronary artery disease (1993), COVID-19 in immunocompromised patient (02/26/2024), Cystitis (05/10/2022), Depression, Encounter for blood transfusion, Fibromyalgia, Heart failure, Heart transplanted (1993), History of hyperparathyroidism; Hyperparathyroidism, secondary renal, Hypertension, Immune disorder, 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    PSH:   has a past surgical history that includes Cardiac pacemaker removal (Left, 06/26/2014); Bladder surgery (2015 approx);  Carpal tunnel release (Left, 03/03/2015); Hysterectomy (1983); Hernia repair (Right, 1971 approx); Breast surgery (Left, 09/28/2015); Breast surgery (Right, 12/2015); Toe Surgery; Colonoscopy (N/A, 02/25/2021); Breast biopsy (Bilateral); Heart transplant (1993); [place] lymph node biopsy (Left, 10/12/2021); Insertion of tunneled central venous catheter (CVC) with subcutaneous port (N/A, 11/09/2021); Incision and drainage of abscess (Left, 12/24/2021); Removal of vascular access port; Breast lumpectomy (Left, 2021); Injection of anesthetic agent into sacroiliac joint (Right, 08/22/2022); Injection of anesthetic agent around medial branch nerves innervating lumbar facet joint (Right, 10/19/2022); Injection of anesthetic agent around medial branch nerves innervating lumbar facet joint (Right, 11/09/2022); Breast biopsy (Right, 10/31/2022); Radiofrequency thermocoagulation (Right, 12/07/2022); Epidural steroid injection into cervical spine (N/A, 02/02/2023); Cystocele repair; Robot-assisted laparoscopic abdominal sacrocolpopexy (N/A, 8/10/2023); xi robotic urethropexy (N/A, 8/10/2023); and Robot-assisted laparoscopic oophorectomy (Right, 8/10/2023).    FamHx: family history includes Breast cancer in her maternal grandmother and mother; Cancer (age of onset: 38) in her mother; Cataracts in her cousin; Heart disease in her maternal grandmother; Hypertension in her son.    SocHx:  reports that she has never smoked. She has never been exposed to tobacco smoke. She has never used smokeless tobacco. She reports that she does not drink alcohol and does not use drugs.      Physical Exam:  There were no vitals filed for this visit.  General: A&Ox3, no apparent distress, no deformities  Neck: No masses, normal ROM  Lungs: normal inspiration, no use of accessory muscles  Heart: normal pulse, no arrhythmias  Abdomen: Soft, NT, ND, incision is well healed  Skin: The skin is warm and dry. No jaundice.  Ext: No  c/c/e.    Labs/Studies:   PNE 5/22/24    Impression/Plan:       1. Urge incontinence-  InterStim  6/11/24    Patient has done well following the InterStim, may revise as appropriate.  See me back in 6 months with a PVR, call with any issues prior to that visit.  Of note she has previously failed VESIcare and myrbetriq.  It appears that the fall did not affect the InterStim.     2. Stress incontinence-  robotic sacrocolpopexy, urethropexy, right oophorectomy OBGYN  8/10/23    No evidence of stress incontinence, nothing further at this juncture.    3. UTI- no evidence of UTI, call with any issues.

## 2024-07-16 ENCOUNTER — TELEPHONE (OUTPATIENT)
Dept: PRIMARY CARE CLINIC | Facility: CLINIC | Age: 69
End: 2024-07-16
Payer: MEDICARE

## 2024-07-16 ENCOUNTER — TELEPHONE (OUTPATIENT)
Dept: UROLOGY | Facility: CLINIC | Age: 69
End: 2024-07-16
Payer: MEDICARE

## 2024-07-16 DIAGNOSIS — F51.01 PRIMARY INSOMNIA: ICD-10-CM

## 2024-07-16 DIAGNOSIS — M48.07 SPINAL STENOSIS OF LUMBOSACRAL REGION: Primary | ICD-10-CM

## 2024-07-16 DIAGNOSIS — F41.9 ANXIETY: ICD-10-CM

## 2024-07-16 NOTE — TELEPHONE ENCOUNTER
RN requested that LCSW call patient for possible counseling. Called, spoke w/ patient. Today is her birthday; will celebrate with family this weekend. Patient says she has friends and family support but has felt overwhelmed by her medical issues. Interested in meeting LCSW at time of her next MD appt to consider scheduling a full counseling session. Scheduled 30 minute visit for 7/26/24 prior to her MD appt that date.

## 2024-07-16 NOTE — TELEPHONE ENCOUNTER
----- Message from Carolina Elder sent at 5/30/2024  8:16 AM CDT -----  Regarding: RE: Cardiac Clearance  She completed the echo Yesterday  Dr. Tubbs was looking over her chart.  ----- Message -----  From: Milanes Flores, Loraine, RENETTA  Sent: 5/30/2024   7:36 AM CDT  To: Carolina Elder  Subject: RE: Cardiac Clearance                            Good morning!     Thank your for your answer, does she need to call you to schedule any appointment?  ----- Message -----  From: Carolina Elder  Sent: 5/27/2024   3:43 PM CDT  To: Loraine Milanes Flores, RN  Subject: RE: Cardiac Clearance                            Dr Tubbs would like her to get an echo before Clearing her.  ----- Message -----  From: Milanes Flores, Loraine, RN  Sent: 5/27/2024   3:04 PM CDT  To: Chau ASHLEY Staff  Subject: Cardiac Clearance                                Hello!     This patient will have surgery on June 11 with Dr Cochran ([facility]) and we need a cardiac clearance. Thank you in advance!

## 2024-07-16 NOTE — TELEPHONE ENCOUNTER
"Spoke w Ms. Damon by phone  She did speak w [employer] SAE[employer] Génesis Gonzales this afternoon and they plan to meet when she comes here for f/u w Dr. Hernandez on Friday.   Concerning upcoming MRIs - She denies claustrophobia - anxiety more to do with what might be found.  Limited support locally - family mostly in [place] area now - having more difficulty managing on her own.  She does have friends in her apartment complex with whom she will be celebrating her birthday on Saturday and she has "Voodoo family" she can reach out to but they are aging and have their own challenges to manage.   She does use Smart-phone - discussed trying on-line grocery ordering either for delivery or store pick-up.   "

## 2024-07-19 ENCOUNTER — HOSPITAL ENCOUNTER (INPATIENT)
Facility: HOSPITAL | Age: 69
LOS: 9 days | Discharge: NURSING, REHAB OR LONG-TERM CARE | DRG: 062 | End: 2024-07-29
Attending: EMERGENCY MEDICINE | Admitting: INTERNAL MEDICINE
Payer: MEDICARE

## 2024-07-19 DIAGNOSIS — R47.9 SPEECH DISTURBANCE, UNSPECIFIED TYPE: ICD-10-CM

## 2024-07-19 DIAGNOSIS — I63.511 ACUTE ISCHEMIC RIGHT MIDDLE CEREBRAL ARTERY (MCA) STROKE: ICD-10-CM

## 2024-07-19 DIAGNOSIS — N18.9 CHRONIC KIDNEY DISEASE, UNSPECIFIED CKD STAGE: ICD-10-CM

## 2024-07-19 DIAGNOSIS — R29.818 ACUTE FOCAL NEUROLOGICAL DEFICIT: ICD-10-CM

## 2024-07-19 DIAGNOSIS — Q21.12 PATENT FORAMEN OVALE: ICD-10-CM

## 2024-07-19 DIAGNOSIS — R51.9 NONINTRACTABLE HEADACHE, UNSPECIFIED CHRONICITY PATTERN, UNSPECIFIED HEADACHE TYPE: ICD-10-CM

## 2024-07-19 DIAGNOSIS — M54.9 DORSALGIA, UNSPECIFIED: ICD-10-CM

## 2024-07-19 DIAGNOSIS — R94.31 PROLONGED Q-T INTERVAL ON ECG: ICD-10-CM

## 2024-07-19 DIAGNOSIS — N17.9 AKI (ACUTE KIDNEY INJURY): ICD-10-CM

## 2024-07-19 DIAGNOSIS — D63.1 ANEMIA DUE TO CHRONIC KIDNEY DISEASE, UNSPECIFIED CKD STAGE: ICD-10-CM

## 2024-07-19 DIAGNOSIS — N18.9 ANEMIA DUE TO CHRONIC KIDNEY DISEASE, UNSPECIFIED CKD STAGE: ICD-10-CM

## 2024-07-19 DIAGNOSIS — R47.01 APHASIA: ICD-10-CM

## 2024-07-19 DIAGNOSIS — F51.01 PRIMARY INSOMNIA: ICD-10-CM

## 2024-07-19 DIAGNOSIS — I63.9 STROKE: ICD-10-CM

## 2024-07-19 DIAGNOSIS — I50.32 CHRONIC DIASTOLIC CONGESTIVE HEART FAILURE: ICD-10-CM

## 2024-07-19 DIAGNOSIS — E78.49 OTHER HYPERLIPIDEMIA: ICD-10-CM

## 2024-07-19 DIAGNOSIS — M48.07 SPINAL STENOSIS OF LUMBOSACRAL REGION: Primary | ICD-10-CM

## 2024-07-19 DIAGNOSIS — M54.9 BACK PAIN, UNSPECIFIED BACK LOCATION, UNSPECIFIED BACK PAIN LATERALITY, UNSPECIFIED CHRONICITY: ICD-10-CM

## 2024-07-19 DIAGNOSIS — E03.9 HYPOTHYROIDISM, UNSPECIFIED TYPE: ICD-10-CM

## 2024-07-19 DIAGNOSIS — R42 DIZZY SPELLS: ICD-10-CM

## 2024-07-19 DIAGNOSIS — I10 ESSENTIAL HYPERTENSION: Chronic | ICD-10-CM

## 2024-07-19 DIAGNOSIS — R94.31 QT PROLONGATION: ICD-10-CM

## 2024-07-19 DIAGNOSIS — Z94.1 HEART TRANSPLANTED: Chronic | ICD-10-CM

## 2024-07-19 LAB
ALBUMIN SERPL BCP-MCNC: 3.8 G/DL (ref 3.5–5.2)
ALP SERPL-CCNC: 104 U/L (ref 55–135)
ALT SERPL W/O P-5'-P-CCNC: 22 U/L (ref 10–44)
ANION GAP SERPL CALC-SCNC: 14 MMOL/L (ref 8–16)
AST SERPL-CCNC: 43 U/L (ref 10–40)
BASOPHILS # BLD AUTO: 0.02 K/UL (ref 0–0.2)
BASOPHILS NFR BLD: 0.4 % (ref 0–1.9)
BILIRUB SERPL-MCNC: 0.6 MG/DL (ref 0.1–1)
BNP SERPL-MCNC: 430 PG/ML (ref 0–99)
BUN SERPL-MCNC: 66 MG/DL (ref 8–23)
CALCIUM SERPL-MCNC: 9.8 MG/DL (ref 8.7–10.5)
CHLORIDE SERPL-SCNC: 107 MMOL/L (ref 95–110)
CO2 SERPL-SCNC: 16 MMOL/L (ref 23–29)
CREAT SERPL-MCNC: 4.1 MG/DL (ref 0.5–1.4)
DIFFERENTIAL METHOD BLD: ABNORMAL
EOSINOPHIL # BLD AUTO: 0.1 K/UL (ref 0–0.5)
EOSINOPHIL NFR BLD: 1.2 % (ref 0–8)
ERYTHROCYTE [DISTWIDTH] IN BLOOD BY AUTOMATED COUNT: 15.9 % (ref 11.5–14.5)
EST. GFR  (NO RACE VARIABLE): 11 ML/MIN/1.73 M^2
GLUCOSE SERPL-MCNC: 103 MG/DL (ref 70–110)
HCT VFR BLD AUTO: 29.4 % (ref 37–48.5)
HGB BLD-MCNC: 9.4 G/DL (ref 12–16)
IMM GRANULOCYTES # BLD AUTO: 0.02 K/UL (ref 0–0.04)
IMM GRANULOCYTES NFR BLD AUTO: 0.4 % (ref 0–0.5)
INR PPP: 0.9 (ref 0.8–1.2)
LYMPHOCYTES # BLD AUTO: 1.5 K/UL (ref 1–4.8)
LYMPHOCYTES NFR BLD: 28.6 % (ref 18–48)
MCH RBC QN AUTO: 28.2 PG (ref 27–31)
MCHC RBC AUTO-ENTMCNC: 32 G/DL (ref 32–36)
MCV RBC AUTO: 88 FL (ref 82–98)
MONOCYTES # BLD AUTO: 0.6 K/UL (ref 0.3–1)
MONOCYTES NFR BLD: 12 % (ref 4–15)
NEUTROPHILS # BLD AUTO: 2.9 K/UL (ref 1.8–7.7)
NEUTROPHILS NFR BLD: 57.4 % (ref 38–73)
NRBC BLD-RTO: 0 /100 WBC
PLATELET # BLD AUTO: 205 K/UL (ref 150–450)
PMV BLD AUTO: 10 FL (ref 9.2–12.9)
POCT GLUCOSE: 115 MG/DL (ref 70–110)
POTASSIUM SERPL-SCNC: 4.6 MMOL/L (ref 3.5–5.1)
PROT SERPL-MCNC: 8.5 G/DL (ref 6–8.4)
PROTHROMBIN TIME: 11 SEC (ref 9–12.5)
RBC # BLD AUTO: 3.33 M/UL (ref 4–5.4)
SODIUM SERPL-SCNC: 137 MMOL/L (ref 136–145)
T4 FREE SERPL-MCNC: 0.94 NG/DL (ref 0.71–1.51)
TROPONIN I SERPL DL<=0.01 NG/ML-MCNC: 0.02 NG/ML (ref 0–0.03)
TSH SERPL DL<=0.005 MIU/L-ACNC: 5.82 UIU/ML (ref 0.4–4)
WBC # BLD AUTO: 5.07 K/UL (ref 3.9–12.7)

## 2024-07-19 PROCEDURE — 84484 ASSAY OF TROPONIN QUANT: CPT | Mod: HCNC | Performed by: EMERGENCY MEDICINE

## 2024-07-19 PROCEDURE — 85610 PROTHROMBIN TIME: CPT | Mod: HCNC | Performed by: EMERGENCY MEDICINE

## 2024-07-19 PROCEDURE — 96374 THER/PROPH/DIAG INJ IV PUSH: CPT | Mod: HCNC

## 2024-07-19 PROCEDURE — 3E03317 INTRODUCTION OF OTHER THROMBOLYTIC INTO PERIPHERAL VEIN, PERCUTANEOUS APPROACH: ICD-10-PCS | Performed by: FAMILY MEDICINE

## 2024-07-19 PROCEDURE — 83880 ASSAY OF NATRIURETIC PEPTIDE: CPT | Mod: HCNC | Performed by: EMERGENCY MEDICINE

## 2024-07-19 PROCEDURE — 25000003 PHARM REV CODE 250: Mod: HCNC | Performed by: EMERGENCY MEDICINE

## 2024-07-19 PROCEDURE — 63600175 PHARM REV CODE 636 W HCPCS: Mod: HCNC | Performed by: EMERGENCY MEDICINE

## 2024-07-19 PROCEDURE — 96375 TX/PRO/DX INJ NEW DRUG ADDON: CPT | Mod: HCNC

## 2024-07-19 PROCEDURE — 80061 LIPID PANEL: CPT | Mod: HCNC | Performed by: EMERGENCY MEDICINE

## 2024-07-19 PROCEDURE — 84443 ASSAY THYROID STIM HORMONE: CPT | Mod: HCNC | Performed by: EMERGENCY MEDICINE

## 2024-07-19 PROCEDURE — G0408 INPT/TELE FOLLOW UP 35: HCPCS | Mod: 95,HCNC,, | Performed by: STUDENT IN AN ORGANIZED HEALTH CARE EDUCATION/TRAINING PROGRAM

## 2024-07-19 PROCEDURE — A4216 STERILE WATER/SALINE, 10 ML: HCPCS | Mod: HCNC | Performed by: EMERGENCY MEDICINE

## 2024-07-19 PROCEDURE — 84439 ASSAY OF FREE THYROXINE: CPT | Mod: HCNC | Performed by: EMERGENCY MEDICINE

## 2024-07-19 PROCEDURE — 37195 THROMBOLYTIC THERAPY STROKE: CPT | Mod: HCNC

## 2024-07-19 PROCEDURE — 93010 ELECTROCARDIOGRAM REPORT: CPT | Mod: HCNC,,, | Performed by: INTERNAL MEDICINE

## 2024-07-19 PROCEDURE — 85025 COMPLETE CBC W/AUTO DIFF WBC: CPT | Mod: HCNC | Performed by: EMERGENCY MEDICINE

## 2024-07-19 PROCEDURE — 93005 ELECTROCARDIOGRAM TRACING: CPT | Mod: HCNC

## 2024-07-19 PROCEDURE — 80053 COMPREHEN METABOLIC PANEL: CPT | Mod: HCNC | Performed by: EMERGENCY MEDICINE

## 2024-07-19 PROCEDURE — 99285 EMERGENCY DEPT VISIT HI MDM: CPT | Mod: 25,HCNC

## 2024-07-19 PROCEDURE — 82962 GLUCOSE BLOOD TEST: CPT | Mod: HCNC

## 2024-07-19 RX ORDER — ACETAMINOPHEN 325 MG/1
650 TABLET ORAL
Status: COMPLETED | OUTPATIENT
Start: 2024-07-19 | End: 2024-07-19

## 2024-07-19 RX ORDER — FAMOTIDINE 10 MG/ML
20 INJECTION INTRAVENOUS
Status: COMPLETED | OUTPATIENT
Start: 2024-07-19 | End: 2024-07-19

## 2024-07-19 RX ORDER — DIPHENHYDRAMINE HYDROCHLORIDE 50 MG/ML
50 INJECTION INTRAMUSCULAR; INTRAVENOUS
Status: COMPLETED | OUTPATIENT
Start: 2024-07-19 | End: 2024-07-19

## 2024-07-19 RX ORDER — SODIUM CHLORIDE 0.9 % (FLUSH) 0.9 %
10 SYRINGE (ML) INJECTION ONCE
Status: COMPLETED | OUTPATIENT
Start: 2024-07-19 | End: 2024-07-19

## 2024-07-19 RX ORDER — ONDANSETRON HYDROCHLORIDE 2 MG/ML
4 INJECTION, SOLUTION INTRAVENOUS
Status: COMPLETED | OUTPATIENT
Start: 2024-07-20 | End: 2024-07-19

## 2024-07-19 RX ORDER — METHYLPREDNISOLONE SOD SUCC 125 MG
125 VIAL (EA) INJECTION
Status: COMPLETED | OUTPATIENT
Start: 2024-07-19 | End: 2024-07-19

## 2024-07-19 RX ADMIN — FAMOTIDINE 20 MG: 10 INJECTION, SOLUTION INTRAVENOUS at 11:07

## 2024-07-19 RX ADMIN — ACETAMINOPHEN 650 MG: 325 TABLET ORAL at 11:07

## 2024-07-19 RX ADMIN — Medication 10 ML: at 10:07

## 2024-07-19 RX ADMIN — DIPHENHYDRAMINE HYDROCHLORIDE 50 MG: 50 INJECTION, SOLUTION INTRAMUSCULAR; INTRAVENOUS at 11:07

## 2024-07-19 RX ADMIN — METHYLPREDNISOLONE SODIUM SUCCINATE 125 MG: 125 INJECTION, POWDER, FOR SOLUTION INTRAMUSCULAR; INTRAVENOUS at 11:07

## 2024-07-19 RX ADMIN — ONDANSETRON 4 MG: 2 INJECTION INTRAMUSCULAR; INTRAVENOUS at 11:07

## 2024-07-19 RX ADMIN — Medication 19 MG: at 10:07

## 2024-07-20 PROBLEM — I63.9 STROKE: Status: ACTIVE | Noted: 2024-07-20

## 2024-07-20 LAB
ABO + RH BLD: NORMAL
ALBUMIN SERPL BCP-MCNC: 3.4 G/DL (ref 3.5–5.2)
ALP SERPL-CCNC: 97 U/L (ref 55–135)
ALT SERPL W/O P-5'-P-CCNC: 21 U/L (ref 10–44)
AMPHET+METHAMPHET UR QL: NEGATIVE
ANION GAP SERPL CALC-SCNC: 16 MMOL/L (ref 8–16)
ANION GAP SERPL CALC-SCNC: 19 MMOL/L (ref 8–16)
AORTIC ROOT ANNULUS: 3.18 CM
ASCENDING AORTA: 3.3 CM
AST SERPL-CCNC: 47 U/L (ref 10–40)
AV INDEX (PROSTH): 0.72
AV MEAN GRADIENT: 6 MMHG
AV PEAK GRADIENT: 10 MMHG
AV VALVE AREA BY VELOCITY RATIO: 2.54 CM²
AV VALVE AREA: 2.28 CM²
AV VELOCITY RATIO: 0.8
BARBITURATES UR QL SCN>200 NG/ML: NEGATIVE
BASOPHILS # BLD AUTO: 0.01 K/UL (ref 0–0.2)
BASOPHILS NFR BLD: 0.2 % (ref 0–1.9)
BENZODIAZ UR QL SCN>200 NG/ML: NEGATIVE
BILIRUB SERPL-MCNC: 0.5 MG/DL (ref 0.1–1)
BILIRUB UR QL STRIP: NEGATIVE
BLD GP AB SCN CELLS X3 SERPL QL: NORMAL
BSA FOR ECHO PROCEDURE: 1.83 M2
BUN SERPL-MCNC: 71 MG/DL (ref 8–23)
BUN SERPL-MCNC: 72 MG/DL (ref 8–23)
BZE UR QL SCN: NEGATIVE
CALCIUM SERPL-MCNC: 9 MG/DL (ref 8.7–10.5)
CALCIUM SERPL-MCNC: 9.3 MG/DL (ref 8.7–10.5)
CANNABINOIDS UR QL SCN: NEGATIVE
CHLORIDE SERPL-SCNC: 107 MMOL/L (ref 95–110)
CHLORIDE SERPL-SCNC: 109 MMOL/L (ref 95–110)
CHOLEST SERPL-MCNC: 142 MG/DL (ref 120–199)
CHOLEST SERPL-MCNC: 152 MG/DL (ref 120–199)
CHOLEST/HDLC SERPL: 3.2 {RATIO} (ref 2–5)
CHOLEST/HDLC SERPL: 3.2 {RATIO} (ref 2–5)
CLARITY UR: CLEAR
CO2 SERPL-SCNC: 14 MMOL/L (ref 23–29)
CO2 SERPL-SCNC: 15 MMOL/L (ref 23–29)
COLOR UR: COLORLESS
CREAT SERPL-MCNC: 3.8 MG/DL (ref 0.5–1.4)
CREAT SERPL-MCNC: 3.8 MG/DL (ref 0.5–1.4)
CREAT UR-MCNC: 29.7 MG/DL (ref 15–325)
CV ECHO LV RWT: 0.43 CM
DIFFERENTIAL METHOD BLD: ABNORMAL
DOP CALC AO PEAK VEL: 1.6 M/S
DOP CALC AO VTI: 35.8 CM
DOP CALC LVOT AREA: 3.2 CM2
DOP CALC LVOT DIAMETER: 2.01 CM
DOP CALC LVOT PEAK VEL: 1.28 M/S
DOP CALC LVOT STROKE VOLUME: 81.51 CM3
DOP CALC RVOT PEAK VEL: 0.63 M/S
DOP CALC RVOT VTI: 11.8 CM
DOP CALCLVOT PEAK VEL VTI: 25.7 CM
E WAVE DECELERATION TIME: 168.57 MSEC
E/A RATIO: 1.14
E/E' RATIO: 8.2 M/S
ECHO LV POSTERIOR WALL: 0.87 CM (ref 0.6–1.1)
EOSINOPHIL # BLD AUTO: 0 K/UL (ref 0–0.5)
EOSINOPHIL NFR BLD: 0 % (ref 0–8)
ERYTHROCYTE [DISTWIDTH] IN BLOOD BY AUTOMATED COUNT: 16 % (ref 11.5–14.5)
EST. GFR  (NO RACE VARIABLE): 12 ML/MIN/1.73 M^2
EST. GFR  (NO RACE VARIABLE): 12 ML/MIN/1.73 M^2
FRACTIONAL SHORTENING: 40 % (ref 28–44)
GLUCOSE SERPL-MCNC: 110 MG/DL (ref 70–110)
GLUCOSE SERPL-MCNC: 172 MG/DL (ref 70–110)
GLUCOSE UR QL STRIP: NEGATIVE
HCT VFR BLD AUTO: 26.9 % (ref 37–48.5)
HDLC SERPL-MCNC: 44 MG/DL (ref 40–75)
HDLC SERPL-MCNC: 48 MG/DL (ref 40–75)
HDLC SERPL: 31 % (ref 20–50)
HDLC SERPL: 31.6 % (ref 20–50)
HGB BLD-MCNC: 8.6 G/DL (ref 12–16)
HGB UR QL STRIP: NEGATIVE
IMM GRANULOCYTES # BLD AUTO: 0.06 K/UL (ref 0–0.04)
IMM GRANULOCYTES NFR BLD AUTO: 1.3 % (ref 0–0.5)
INR PPP: 1 (ref 0.8–1.2)
INTERVENTRICULAR SEPTUM: 1.08 CM (ref 0.6–1.1)
IVRT: 91.34 MSEC
KETONES UR QL STRIP: NEGATIVE
LDLC SERPL CALC-MCNC: 73.8 MG/DL (ref 63–159)
LDLC SERPL CALC-MCNC: 81 MG/DL (ref 63–159)
LEFT ATRIUM SIZE: 4.06 CM
LEFT INTERNAL DIMENSION IN SYSTOLE: 2.46 CM (ref 2.1–4)
LEFT VENTRICLE DIASTOLIC VOLUME INDEX: 41.38 ML/M2
LEFT VENTRICLE DIASTOLIC VOLUME: 73.65 ML
LEFT VENTRICLE MASS INDEX: 71 G/M2
LEFT VENTRICLE SYSTOLIC VOLUME INDEX: 12 ML/M2
LEFT VENTRICLE SYSTOLIC VOLUME: 21.41 ML
LEFT VENTRICULAR INTERNAL DIMENSION IN DIASTOLE: 4.09 CM (ref 3.5–6)
LEFT VENTRICULAR MASS: 127.01 G
LEUKOCYTE ESTERASE UR QL STRIP: NEGATIVE
LV LATERAL E/E' RATIO: 6.31 M/S
LV SEPTAL E/E' RATIO: 11.71 M/S
LVED V (TEICH): 73.65 ML
LVES V (TEICH): 21.41 ML
LVOT MG: 3.61 MMHG
LVOT MV: 0.89 CM/S
LYMPHOCYTES # BLD AUTO: 0.8 K/UL (ref 1–4.8)
LYMPHOCYTES NFR BLD: 16.9 % (ref 18–48)
MAGNESIUM SERPL-MCNC: 2.2 MG/DL (ref 1.6–2.6)
MCH RBC QN AUTO: 28.6 PG (ref 27–31)
MCHC RBC AUTO-ENTMCNC: 32 G/DL (ref 32–36)
MCV RBC AUTO: 89 FL (ref 82–98)
METHADONE UR QL SCN>300 NG/ML: NEGATIVE
MONOCYTES # BLD AUTO: 0.1 K/UL (ref 0.3–1)
MONOCYTES NFR BLD: 2.3 % (ref 4–15)
MV PEAK A VEL: 0.72 M/S
MV PEAK E VEL: 0.82 M/S
NEUTROPHILS # BLD AUTO: 3.8 K/UL (ref 1.8–7.7)
NEUTROPHILS NFR BLD: 79.3 % (ref 38–73)
NITRITE UR QL STRIP: NEGATIVE
NONHDLC SERPL-MCNC: 104 MG/DL
NONHDLC SERPL-MCNC: 98 MG/DL
NRBC BLD-RTO: 0 /100 WBC
OHS CV RV/LV RATIO: 0.79 CM
OPIATES UR QL SCN: NEGATIVE
PCP UR QL SCN>25 NG/ML: NEGATIVE
PH UR STRIP: 6 [PH] (ref 5–8)
PHOSPHATE SERPL-MCNC: 4.5 MG/DL (ref 2.7–4.5)
PISA TR MAX VEL: 2.8 M/S
PLATELET # BLD AUTO: 171 K/UL (ref 150–450)
PMV BLD AUTO: 10.6 FL (ref 9.2–12.9)
POCT GLUCOSE: 119 MG/DL (ref 70–110)
POCT GLUCOSE: 122 MG/DL (ref 70–110)
POCT GLUCOSE: 190 MG/DL (ref 70–110)
POTASSIUM SERPL-SCNC: 4.3 MMOL/L (ref 3.5–5.1)
POTASSIUM SERPL-SCNC: 4.3 MMOL/L (ref 3.5–5.1)
PROT SERPL-MCNC: 7.8 G/DL (ref 6–8.4)
PROT UR QL STRIP: ABNORMAL
PROTHROMBIN TIME: 11.2 SEC (ref 9–12.5)
PV MEAN GRADIENT: 1 MMHG
PV MV: 0.63 M/S
PV PEAK GRADIENT: 2 MMHG
PV PEAK VELOCITY: 0.77 M/S
RA PRESSURE ESTIMATED: 3 MMHG
RBC # BLD AUTO: 3.01 M/UL (ref 4–5.4)
RIGHT VENTRICULAR END-DIASTOLIC DIMENSION: 3.23 CM
RV TB RVSP: 6 MMHG
SODIUM SERPL-SCNC: 140 MMOL/L (ref 136–145)
SODIUM SERPL-SCNC: 140 MMOL/L (ref 136–145)
SP GR UR STRIP: 1.01 (ref 1–1.03)
SPECIMEN OUTDATE: NORMAL
STJ: 2.98 CM
TDI LATERAL: 0.13 M/S
TDI SEPTAL: 0.07 M/S
TDI: 0.1 M/S
TOXICOLOGY INFORMATION: NORMAL
TR MAX PG: 31 MMHG
TRICUSPID ANNULAR PLANE SYSTOLIC EXCURSION: 1.56 CM
TRIGL SERPL-MCNC: 151 MG/DL (ref 30–150)
TRIGL SERPL-MCNC: 85 MG/DL (ref 30–150)
TV REST PULMONARY ARTERY PRESSURE: 34 MMHG
URN SPEC COLLECT METH UR: ABNORMAL
UROBILINOGEN UR STRIP-ACNC: NEGATIVE EU/DL
WBC # BLD AUTO: 4.73 K/UL (ref 3.9–12.7)
Z-SCORE OF LEFT VENTRICULAR DIMENSION IN END DIASTOLE: -1.85
Z-SCORE OF LEFT VENTRICULAR DIMENSION IN END SYSTOLE: -1.68

## 2024-07-20 PROCEDURE — 84100 ASSAY OF PHOSPHORUS: CPT | Mod: HCNC

## 2024-07-20 PROCEDURE — 63600175 PHARM REV CODE 636 W HCPCS: Mod: HCNC

## 2024-07-20 PROCEDURE — 80061 LIPID PANEL: CPT | Mod: HCNC

## 2024-07-20 PROCEDURE — 63600175 PHARM REV CODE 636 W HCPCS: Mod: HCNC | Performed by: EMERGENCY MEDICINE

## 2024-07-20 PROCEDURE — 86901 BLOOD TYPING SEROLOGIC RH(D): CPT | Mod: HCNC

## 2024-07-20 PROCEDURE — 97162 PT EVAL MOD COMPLEX 30 MIN: CPT | Mod: HCNC

## 2024-07-20 PROCEDURE — 85025 COMPLETE CBC W/AUTO DIFF WBC: CPT | Mod: HCNC

## 2024-07-20 PROCEDURE — 86850 RBC ANTIBODY SCREEN: CPT | Mod: HCNC

## 2024-07-20 PROCEDURE — 97530 THERAPEUTIC ACTIVITIES: CPT | Mod: HCNC

## 2024-07-20 PROCEDURE — 81003 URINALYSIS AUTO W/O SCOPE: CPT | Mod: HCNC,59 | Performed by: EMERGENCY MEDICINE

## 2024-07-20 PROCEDURE — 80307 DRUG TEST PRSMV CHEM ANLYZR: CPT | Mod: HCNC | Performed by: EMERGENCY MEDICINE

## 2024-07-20 PROCEDURE — 97166 OT EVAL MOD COMPLEX 45 MIN: CPT | Mod: HCNC

## 2024-07-20 PROCEDURE — 85610 PROTHROMBIN TIME: CPT | Mod: HCNC

## 2024-07-20 PROCEDURE — 86900 BLOOD TYPING SEROLOGIC ABO: CPT | Mod: HCNC

## 2024-07-20 PROCEDURE — 80053 COMPREHEN METABOLIC PANEL: CPT | Mod: HCNC

## 2024-07-20 PROCEDURE — 20000000 HC ICU ROOM: Mod: HCNC

## 2024-07-20 PROCEDURE — 92610 EVALUATE SWALLOWING FUNCTION: CPT | Mod: HCNC

## 2024-07-20 PROCEDURE — 63600175 PHARM REV CODE 636 W HCPCS: Mod: HCNC | Performed by: INTERNAL MEDICINE

## 2024-07-20 PROCEDURE — 36415 COLL VENOUS BLD VENIPUNCTURE: CPT | Mod: HCNC | Performed by: INTERNAL MEDICINE

## 2024-07-20 PROCEDURE — 25000003 PHARM REV CODE 250: Mod: HCNC

## 2024-07-20 PROCEDURE — 83735 ASSAY OF MAGNESIUM: CPT | Mod: HCNC

## 2024-07-20 PROCEDURE — 25000003 PHARM REV CODE 250: Mod: HCNC | Performed by: INTERNAL MEDICINE

## 2024-07-20 PROCEDURE — 80048 BASIC METABOLIC PNL TOTAL CA: CPT | Mod: HCNC,XB | Performed by: INTERNAL MEDICINE

## 2024-07-20 RX ORDER — CYCLOSPORINE 25 MG/1
75 CAPSULE, GELATIN COATED ORAL 2 TIMES DAILY
Status: DISCONTINUED | OUTPATIENT
Start: 2024-07-20 | End: 2024-07-20

## 2024-07-20 RX ORDER — OXYCODONE AND ACETAMINOPHEN 5; 325 MG/1; MG/1
1 TABLET ORAL EVERY 4 HOURS PRN
Status: DISCONTINUED | OUTPATIENT
Start: 2024-07-20 | End: 2024-07-22

## 2024-07-20 RX ORDER — HEPARIN SODIUM 5000 [USP'U]/ML
5000 INJECTION, SOLUTION INTRAVENOUS; SUBCUTANEOUS EVERY 8 HOURS
Status: DISCONTINUED | OUTPATIENT
Start: 2024-07-20 | End: 2024-07-29 | Stop reason: HOSPADM

## 2024-07-20 RX ORDER — CHLORHEXIDINE GLUCONATE ORAL RINSE 1.2 MG/ML
15 SOLUTION DENTAL 2 TIMES DAILY
Status: COMPLETED | OUTPATIENT
Start: 2024-07-20 | End: 2024-07-24

## 2024-07-20 RX ORDER — ASPIRIN 81 MG/1
81 TABLET ORAL DAILY
Status: DISCONTINUED | OUTPATIENT
Start: 2024-07-20 | End: 2024-07-20

## 2024-07-20 RX ORDER — VASOPRESSIN IN DEXTROSE 5 % 25/250 ML
0.04 PLASTIC BAG, INJECTION (ML) INTRAVENOUS CONTINUOUS
Status: DISCONTINUED | OUTPATIENT
Start: 2024-07-20 | End: 2024-07-20

## 2024-07-20 RX ORDER — CYCLOSPORINE 25 MG/1
75 CAPSULE, LIQUID FILLED ORAL 2 TIMES DAILY
Status: DISCONTINUED | OUTPATIENT
Start: 2024-07-20 | End: 2024-07-29 | Stop reason: HOSPADM

## 2024-07-20 RX ORDER — LABETALOL HYDROCHLORIDE 5 MG/ML
10 INJECTION, SOLUTION INTRAVENOUS ONCE
Status: COMPLETED | OUTPATIENT
Start: 2024-07-20 | End: 2024-07-20

## 2024-07-20 RX ORDER — FAMOTIDINE 20 MG/1
20 TABLET, FILM COATED ORAL DAILY
Status: DISCONTINUED | OUTPATIENT
Start: 2024-07-20 | End: 2024-07-29 | Stop reason: HOSPADM

## 2024-07-20 RX ORDER — ASPIRIN 81 MG/1
81 TABLET ORAL DAILY
Status: DISCONTINUED | OUTPATIENT
Start: 2024-07-20 | End: 2024-07-29 | Stop reason: HOSPADM

## 2024-07-20 RX ORDER — SODIUM CHLORIDE 0.9 % (FLUSH) 0.9 %
10 SYRINGE (ML) INJECTION
Status: DISCONTINUED | OUTPATIENT
Start: 2024-07-20 | End: 2024-07-29 | Stop reason: HOSPADM

## 2024-07-20 RX ORDER — NOREPINEPHRINE BITARTRATE/D5W 4MG/250ML
0-3 PLASTIC BAG, INJECTION (ML) INTRAVENOUS CONTINUOUS
Status: DISCONTINUED | OUTPATIENT
Start: 2024-07-20 | End: 2024-07-20

## 2024-07-20 RX ORDER — DIPHENHYDRAMINE HCL 25 MG
25 CAPSULE ORAL EVERY 6 HOURS PRN
Status: DISCONTINUED | OUTPATIENT
Start: 2024-07-20 | End: 2024-07-26

## 2024-07-20 RX ORDER — ACETAMINOPHEN 325 MG/1
650 TABLET ORAL EVERY 6 HOURS PRN
Status: DISCONTINUED | OUTPATIENT
Start: 2024-07-20 | End: 2024-07-29 | Stop reason: HOSPADM

## 2024-07-20 RX ORDER — MUPIROCIN 20 MG/G
OINTMENT TOPICAL 2 TIMES DAILY
Status: COMPLETED | OUTPATIENT
Start: 2024-07-20 | End: 2024-07-24

## 2024-07-20 RX ORDER — DIPHENHYDRAMINE HYDROCHLORIDE 50 MG/ML
25 INJECTION INTRAMUSCULAR; INTRAVENOUS ONCE
Status: COMPLETED | OUTPATIENT
Start: 2024-07-20 | End: 2024-07-20

## 2024-07-20 RX ORDER — ATORVASTATIN CALCIUM 40 MG/1
40 TABLET, FILM COATED ORAL DAILY
Status: DISCONTINUED | OUTPATIENT
Start: 2024-07-20 | End: 2024-07-29 | Stop reason: HOSPADM

## 2024-07-20 RX ORDER — BISACODYL 10 MG/1
10 SUPPOSITORY RECTAL DAILY PRN
Status: DISCONTINUED | OUTPATIENT
Start: 2024-07-20 | End: 2024-07-29 | Stop reason: HOSPADM

## 2024-07-20 RX ORDER — PREGABALIN 25 MG/1
50 CAPSULE ORAL 2 TIMES DAILY
Status: DISCONTINUED | OUTPATIENT
Start: 2024-07-20 | End: 2024-07-29 | Stop reason: HOSPADM

## 2024-07-20 RX ORDER — HYDRALAZINE HYDROCHLORIDE 50 MG/1
50 TABLET, FILM COATED ORAL EVERY 8 HOURS
Status: DISCONTINUED | OUTPATIENT
Start: 2024-07-20 | End: 2024-07-29 | Stop reason: HOSPADM

## 2024-07-20 RX ADMIN — METHYLPREDNISOLONE SODIUM SUCCINATE 60 MG: 40 INJECTION, POWDER, FOR SOLUTION INTRAMUSCULAR; INTRAVENOUS at 01:07

## 2024-07-20 RX ADMIN — ATORVASTATIN CALCIUM 40 MG: 40 TABLET, FILM COATED ORAL at 10:07

## 2024-07-20 RX ADMIN — CHLORHEXIDINE GLUCONATE 0.12% ORAL RINSE 15 ML: 1.2 LIQUID ORAL at 08:07

## 2024-07-20 RX ADMIN — METHYLPREDNISOLONE SODIUM SUCCINATE 60 MG: 40 INJECTION, POWDER, FOR SOLUTION INTRAMUSCULAR; INTRAVENOUS at 08:07

## 2024-07-20 RX ADMIN — ASPIRIN 81 MG: 81 TABLET, COATED ORAL at 11:07

## 2024-07-20 RX ADMIN — HEPARIN SODIUM 5000 UNITS: 5000 INJECTION INTRAVENOUS; SUBCUTANEOUS at 11:07

## 2024-07-20 RX ADMIN — CHLORHEXIDINE GLUCONATE 0.12% ORAL RINSE 15 ML: 1.2 LIQUID ORAL at 10:07

## 2024-07-20 RX ADMIN — PREGABALIN 50 MG: 25 CAPSULE ORAL at 08:07

## 2024-07-20 RX ADMIN — FAMOTIDINE 20 MG: 20 TABLET ORAL at 10:07

## 2024-07-20 RX ADMIN — CYCLOSPORINE 75 MG: 25 CAPSULE, LIQUID FILLED ORAL at 06:07

## 2024-07-20 RX ADMIN — METHYLPREDNISOLONE SODIUM SUCCINATE 60 MG: 40 INJECTION, POWDER, FOR SOLUTION INTRAMUSCULAR; INTRAVENOUS at 04:07

## 2024-07-20 RX ADMIN — ACETAMINOPHEN 650 MG: 325 TABLET ORAL at 03:07

## 2024-07-20 RX ADMIN — LABETALOL HYDROCHLORIDE 10 MG: 5 INJECTION INTRAVENOUS at 08:07

## 2024-07-20 RX ADMIN — DIPHENHYDRAMINE HYDROCHLORIDE 25 MG: 25 CAPSULE ORAL at 01:07

## 2024-07-20 RX ADMIN — HYDRALAZINE HYDROCHLORIDE 50 MG: 50 TABLET ORAL at 10:07

## 2024-07-20 RX ADMIN — DIPHENHYDRAMINE HYDROCHLORIDE 25 MG: 50 INJECTION, SOLUTION INTRAMUSCULAR; INTRAVENOUS at 01:07

## 2024-07-20 RX ADMIN — MUPIROCIN: 20 OINTMENT TOPICAL at 08:07

## 2024-07-20 RX ADMIN — OXYCODONE HYDROCHLORIDE AND ACETAMINOPHEN 1 TABLET: 5; 325 TABLET ORAL at 09:07

## 2024-07-20 RX ADMIN — MUPIROCIN: 20 OINTMENT TOPICAL at 10:07

## 2024-07-20 RX ADMIN — OXYCODONE HYDROCHLORIDE AND ACETAMINOPHEN 1 TABLET: 5; 325 TABLET ORAL at 07:07

## 2024-07-20 RX ADMIN — PREGABALIN 50 MG: 25 CAPSULE ORAL at 10:07

## 2024-07-21 PROBLEM — M19.011 PRIMARY OSTEOARTHRITIS OF RIGHT SHOULDER: Status: ACTIVE | Noted: 2024-07-21

## 2024-07-21 PROBLEM — Q21.12 PATENT FORAMEN OVALE: Status: ACTIVE | Noted: 2024-07-21

## 2024-07-21 PROBLEM — I50.32 CHRONIC DIASTOLIC CONGESTIVE HEART FAILURE: Status: ACTIVE | Noted: 2024-07-21

## 2024-07-21 PROBLEM — G45.9 TIA (TRANSIENT ISCHEMIC ATTACK): Status: ACTIVE | Noted: 2024-07-20

## 2024-07-21 LAB
ANION GAP SERPL CALC-SCNC: 15 MMOL/L (ref 8–16)
BASOPHILS # BLD AUTO: 0 K/UL (ref 0–0.2)
BASOPHILS NFR BLD: 0 % (ref 0–1.9)
BUN SERPL-MCNC: 71 MG/DL (ref 8–23)
CALCIUM SERPL-MCNC: 8.8 MG/DL (ref 8.7–10.5)
CHLORIDE SERPL-SCNC: 107 MMOL/L (ref 95–110)
CO2 SERPL-SCNC: 19 MMOL/L (ref 23–29)
CREAT SERPL-MCNC: 3.6 MG/DL (ref 0.5–1.4)
DIFFERENTIAL METHOD BLD: ABNORMAL
EOSINOPHIL # BLD AUTO: 0 K/UL (ref 0–0.5)
EOSINOPHIL NFR BLD: 0 % (ref 0–8)
ERYTHROCYTE [DISTWIDTH] IN BLOOD BY AUTOMATED COUNT: 15.8 % (ref 11.5–14.5)
EST. GFR  (NO RACE VARIABLE): 13 ML/MIN/1.73 M^2
GLUCOSE SERPL-MCNC: 151 MG/DL (ref 70–110)
HCT VFR BLD AUTO: 28.6 % (ref 37–48.5)
HGB BLD-MCNC: 9.1 G/DL (ref 12–16)
IMM GRANULOCYTES # BLD AUTO: 0.05 K/UL (ref 0–0.04)
IMM GRANULOCYTES NFR BLD AUTO: 0.9 % (ref 0–0.5)
LYMPHOCYTES # BLD AUTO: 0.7 K/UL (ref 1–4.8)
LYMPHOCYTES NFR BLD: 12 % (ref 18–48)
MCH RBC QN AUTO: 28.3 PG (ref 27–31)
MCHC RBC AUTO-ENTMCNC: 31.8 G/DL (ref 32–36)
MCV RBC AUTO: 89 FL (ref 82–98)
MONOCYTES # BLD AUTO: 0.4 K/UL (ref 0.3–1)
MONOCYTES NFR BLD: 7.6 % (ref 4–15)
NEUTROPHILS # BLD AUTO: 4.4 K/UL (ref 1.8–7.7)
NEUTROPHILS NFR BLD: 79.5 % (ref 38–73)
NRBC BLD-RTO: 0 /100 WBC
OHS QRS DURATION: 146 MS
OHS QTC CALCULATION: 555 MS
PLATELET # BLD AUTO: 185 K/UL (ref 150–450)
PMV BLD AUTO: 10.1 FL (ref 9.2–12.9)
POCT GLUCOSE: 101 MG/DL (ref 70–110)
POCT GLUCOSE: 129 MG/DL (ref 70–110)
POTASSIUM SERPL-SCNC: 4.3 MMOL/L (ref 3.5–5.1)
RBC # BLD AUTO: 3.21 M/UL (ref 4–5.4)
SODIUM SERPL-SCNC: 141 MMOL/L (ref 136–145)
WBC # BLD AUTO: 5.5 K/UL (ref 3.9–12.7)

## 2024-07-21 PROCEDURE — 25000003 PHARM REV CODE 250: Mod: HCNC | Performed by: NURSE PRACTITIONER

## 2024-07-21 PROCEDURE — 63600175 PHARM REV CODE 636 W HCPCS: Mod: HCNC | Performed by: INTERNAL MEDICINE

## 2024-07-21 PROCEDURE — 97530 THERAPEUTIC ACTIVITIES: CPT | Mod: HCNC,CQ

## 2024-07-21 PROCEDURE — 25000003 PHARM REV CODE 250: Mod: HCNC | Performed by: INTERNAL MEDICINE

## 2024-07-21 PROCEDURE — 36415 COLL VENOUS BLD VENIPUNCTURE: CPT | Mod: HCNC

## 2024-07-21 PROCEDURE — 36415 COLL VENOUS BLD VENIPUNCTURE: CPT | Mod: HCNC | Performed by: INTERNAL MEDICINE

## 2024-07-21 PROCEDURE — 94761 N-INVAS EAR/PLS OXIMETRY MLT: CPT | Mod: HCNC

## 2024-07-21 PROCEDURE — 80048 BASIC METABOLIC PNL TOTAL CA: CPT | Mod: HCNC | Performed by: INTERNAL MEDICINE

## 2024-07-21 PROCEDURE — 25000003 PHARM REV CODE 250: Mod: HCNC

## 2024-07-21 PROCEDURE — 85025 COMPLETE CBC W/AUTO DIFF WBC: CPT | Mod: HCNC

## 2024-07-21 PROCEDURE — 21400001 HC TELEMETRY ROOM: Mod: HCNC

## 2024-07-21 PROCEDURE — 99223 1ST HOSP IP/OBS HIGH 75: CPT | Mod: HCNC,,, | Performed by: INTERNAL MEDICINE

## 2024-07-21 RX ORDER — NIFEDIPINE 30 MG/1
30 TABLET, EXTENDED RELEASE ORAL DAILY
Status: DISCONTINUED | OUTPATIENT
Start: 2024-07-21 | End: 2024-07-29 | Stop reason: HOSPADM

## 2024-07-21 RX ORDER — TEMAZEPAM 7.5 MG/1
15 CAPSULE ORAL NIGHTLY PRN
Status: ON HOLD | COMMUNITY
End: 2024-07-29 | Stop reason: HOSPADM

## 2024-07-21 RX ORDER — HYDRALAZINE HYDROCHLORIDE 20 MG/ML
10 INJECTION INTRAMUSCULAR; INTRAVENOUS EVERY 6 HOURS PRN
Status: DISCONTINUED | OUTPATIENT
Start: 2024-07-21 | End: 2024-07-25

## 2024-07-21 RX ORDER — ZOLPIDEM TARTRATE 5 MG/1
5 TABLET ORAL NIGHTLY PRN
Status: DISCONTINUED | OUTPATIENT
Start: 2024-07-21 | End: 2024-07-29 | Stop reason: HOSPADM

## 2024-07-21 RX ORDER — LIDOCAINE 50 MG/G
1 PATCH TOPICAL DAILY
Status: DISCONTINUED | OUTPATIENT
Start: 2024-07-21 | End: 2024-07-29 | Stop reason: HOSPADM

## 2024-07-21 RX ORDER — TIZANIDINE 4 MG/1
4 TABLET ORAL EVERY 8 HOURS
Status: DISCONTINUED | OUTPATIENT
Start: 2024-07-21 | End: 2024-07-29 | Stop reason: HOSPADM

## 2024-07-21 RX ORDER — CARVEDILOL 6.25 MG/1
6.25 TABLET ORAL 2 TIMES DAILY WITH MEALS
Status: DISCONTINUED | OUTPATIENT
Start: 2024-07-21 | End: 2024-07-29 | Stop reason: HOSPADM

## 2024-07-21 RX ADMIN — HYDRALAZINE HYDROCHLORIDE 50 MG: 50 TABLET ORAL at 09:07

## 2024-07-21 RX ADMIN — NIFEDIPINE 30 MG: 30 TABLET, FILM COATED, EXTENDED RELEASE ORAL at 01:07

## 2024-07-21 RX ADMIN — HYDRALAZINE HYDROCHLORIDE 50 MG: 50 TABLET ORAL at 01:07

## 2024-07-21 RX ADMIN — CARVEDILOL 6.25 MG: 6.25 TABLET, FILM COATED ORAL at 05:07

## 2024-07-21 RX ADMIN — ACETAMINOPHEN 650 MG: 325 TABLET ORAL at 09:07

## 2024-07-21 RX ADMIN — HEPARIN SODIUM 5000 UNITS: 5000 INJECTION INTRAVENOUS; SUBCUTANEOUS at 05:07

## 2024-07-21 RX ADMIN — HEPARIN SODIUM 5000 UNITS: 5000 INJECTION INTRAVENOUS; SUBCUTANEOUS at 09:07

## 2024-07-21 RX ADMIN — LIDOCAINE 5% 1 PATCH: 700 PATCH TOPICAL at 01:07

## 2024-07-21 RX ADMIN — TIZANIDINE 4 MG: 4 TABLET ORAL at 01:07

## 2024-07-21 RX ADMIN — MUPIROCIN: 20 OINTMENT TOPICAL at 09:07

## 2024-07-21 RX ADMIN — CYCLOSPORINE 75 MG: 25 CAPSULE, LIQUID FILLED ORAL at 05:07

## 2024-07-21 RX ADMIN — ZOLPIDEM TARTRATE 5 MG: 5 TABLET ORAL at 09:07

## 2024-07-21 RX ADMIN — PREGABALIN 50 MG: 25 CAPSULE ORAL at 09:07

## 2024-07-21 RX ADMIN — CHLORHEXIDINE GLUCONATE 0.12% ORAL RINSE 15 ML: 1.2 LIQUID ORAL at 09:07

## 2024-07-21 RX ADMIN — TIZANIDINE 4 MG: 4 TABLET ORAL at 09:07

## 2024-07-21 RX ADMIN — FAMOTIDINE 20 MG: 20 TABLET ORAL at 09:07

## 2024-07-21 RX ADMIN — ATORVASTATIN CALCIUM 40 MG: 40 TABLET, FILM COATED ORAL at 09:07

## 2024-07-21 RX ADMIN — OXYCODONE HYDROCHLORIDE AND ACETAMINOPHEN 1 TABLET: 5; 325 TABLET ORAL at 07:07

## 2024-07-21 RX ADMIN — CYCLOSPORINE 75 MG: 25 CAPSULE, LIQUID FILLED ORAL at 09:07

## 2024-07-21 RX ADMIN — ASPIRIN 81 MG: 81 TABLET, COATED ORAL at 09:07

## 2024-07-21 RX ADMIN — HEPARIN SODIUM 5000 UNITS: 5000 INJECTION INTRAVENOUS; SUBCUTANEOUS at 01:07

## 2024-07-21 RX ADMIN — HYDRALAZINE HYDROCHLORIDE 50 MG: 50 TABLET ORAL at 05:07

## 2024-07-21 RX ADMIN — OXYCODONE HYDROCHLORIDE AND ACETAMINOPHEN 1 TABLET: 5; 325 TABLET ORAL at 02:07

## 2024-07-22 PROBLEM — I63.511 ACUTE ISCHEMIC RIGHT MIDDLE CEREBRAL ARTERY (MCA) STROKE: Status: ACTIVE | Noted: 2024-07-20

## 2024-07-22 LAB
POCT GLUCOSE: 84 MG/DL (ref 70–110)
POCT GLUCOSE: 86 MG/DL (ref 70–110)
POCT GLUCOSE: 90 MG/DL (ref 70–110)

## 2024-07-22 PROCEDURE — 93010 ELECTROCARDIOGRAM REPORT: CPT | Mod: HCNC,,, | Performed by: INTERNAL MEDICINE

## 2024-07-22 PROCEDURE — 21400001 HC TELEMETRY ROOM: Mod: HCNC

## 2024-07-22 PROCEDURE — 93005 ELECTROCARDIOGRAM TRACING: CPT | Mod: HCNC

## 2024-07-22 PROCEDURE — 25000003 PHARM REV CODE 250: Mod: HCNC | Performed by: NURSE PRACTITIONER

## 2024-07-22 PROCEDURE — 25000003 PHARM REV CODE 250: Mod: HCNC

## 2024-07-22 PROCEDURE — 92526 ORAL FUNCTION THERAPY: CPT | Mod: HCNC

## 2024-07-22 PROCEDURE — 25000003 PHARM REV CODE 250: Mod: HCNC | Performed by: INTERNAL MEDICINE

## 2024-07-22 PROCEDURE — 92507 TX SP LANG VOICE COMM INDIV: CPT | Mod: HCNC

## 2024-07-22 PROCEDURE — G0427 INPT/ED TELECONSULT70: HCPCS | Mod: 95,HCNC,, | Performed by: NURSE PRACTITIONER

## 2024-07-22 PROCEDURE — 63600175 PHARM REV CODE 636 W HCPCS: Mod: HCNC | Performed by: INTERNAL MEDICINE

## 2024-07-22 PROCEDURE — 25000003 PHARM REV CODE 250: Mod: HCNC | Performed by: FAMILY MEDICINE

## 2024-07-22 RX ORDER — OXYCODONE HYDROCHLORIDE 5 MG/1
5 TABLET ORAL EVERY 12 HOURS PRN
Status: DISCONTINUED | OUTPATIENT
Start: 2024-07-22 | End: 2024-07-29 | Stop reason: HOSPADM

## 2024-07-22 RX ORDER — TRAMADOL HYDROCHLORIDE 50 MG/1
50 TABLET ORAL 3 TIMES DAILY
Status: DISCONTINUED | OUTPATIENT
Start: 2024-07-22 | End: 2024-07-29 | Stop reason: HOSPADM

## 2024-07-22 RX ORDER — OXYCODONE AND ACETAMINOPHEN 5; 325 MG/1; MG/1
1 TABLET ORAL ONCE
Status: COMPLETED | OUTPATIENT
Start: 2024-07-22 | End: 2024-07-22

## 2024-07-22 RX ORDER — DIPHENHYDRAMINE HCL 50 MG
50 CAPSULE ORAL ONCE
Status: COMPLETED | OUTPATIENT
Start: 2024-07-22 | End: 2024-07-22

## 2024-07-22 RX ORDER — TRAMADOL HYDROCHLORIDE 50 MG/1
50 TABLET ORAL 2 TIMES DAILY
Status: DISCONTINUED | OUTPATIENT
Start: 2024-07-22 | End: 2024-07-22

## 2024-07-22 RX ADMIN — PREGABALIN 50 MG: 25 CAPSULE ORAL at 08:07

## 2024-07-22 RX ADMIN — NIFEDIPINE 30 MG: 30 TABLET, FILM COATED, EXTENDED RELEASE ORAL at 08:07

## 2024-07-22 RX ADMIN — CARVEDILOL 6.25 MG: 6.25 TABLET, FILM COATED ORAL at 04:07

## 2024-07-22 RX ADMIN — TRAMADOL HYDROCHLORIDE 50 MG: 50 TABLET, COATED ORAL at 11:07

## 2024-07-22 RX ADMIN — FAMOTIDINE 20 MG: 20 TABLET ORAL at 08:07

## 2024-07-22 RX ADMIN — MUPIROCIN: 20 OINTMENT TOPICAL at 08:07

## 2024-07-22 RX ADMIN — TIZANIDINE 4 MG: 4 TABLET ORAL at 08:07

## 2024-07-22 RX ADMIN — ASPIRIN 81 MG: 81 TABLET, COATED ORAL at 08:07

## 2024-07-22 RX ADMIN — ACETAMINOPHEN 650 MG: 325 TABLET ORAL at 09:07

## 2024-07-22 RX ADMIN — TIZANIDINE 4 MG: 4 TABLET ORAL at 01:07

## 2024-07-22 RX ADMIN — OXYCODONE HYDROCHLORIDE AND ACETAMINOPHEN 1 TABLET: 5; 325 TABLET ORAL at 03:07

## 2024-07-22 RX ADMIN — HYDRALAZINE HYDROCHLORIDE 50 MG: 50 TABLET ORAL at 05:07

## 2024-07-22 RX ADMIN — CARVEDILOL 6.25 MG: 6.25 TABLET, FILM COATED ORAL at 08:07

## 2024-07-22 RX ADMIN — TRAMADOL HYDROCHLORIDE 50 MG: 50 TABLET, COATED ORAL at 08:07

## 2024-07-22 RX ADMIN — CYCLOSPORINE 75 MG: 25 CAPSULE, LIQUID FILLED ORAL at 05:07

## 2024-07-22 RX ADMIN — CYCLOSPORINE 75 MG: 25 CAPSULE, LIQUID FILLED ORAL at 08:07

## 2024-07-22 RX ADMIN — CHLORHEXIDINE GLUCONATE 0.12% ORAL RINSE 15 ML: 1.2 LIQUID ORAL at 08:07

## 2024-07-22 RX ADMIN — HEPARIN SODIUM 5000 UNITS: 5000 INJECTION INTRAVENOUS; SUBCUTANEOUS at 05:07

## 2024-07-22 RX ADMIN — THERA TABS 1 TABLET: TAB at 08:07

## 2024-07-22 RX ADMIN — OXYCODONE HYDROCHLORIDE 5 MG: 5 TABLET ORAL at 04:07

## 2024-07-22 RX ADMIN — DIPHENHYDRAMINE HYDROCHLORIDE 25 MG: 25 CAPSULE ORAL at 03:07

## 2024-07-22 RX ADMIN — ATORVASTATIN CALCIUM 40 MG: 40 TABLET, FILM COATED ORAL at 08:07

## 2024-07-22 RX ADMIN — OXYCODONE HYDROCHLORIDE AND ACETAMINOPHEN 1 TABLET: 5; 325 TABLET ORAL at 12:07

## 2024-07-22 RX ADMIN — DIPHENHYDRAMINE HYDROCHLORIDE 50 MG: 50 CAPSULE ORAL at 09:07

## 2024-07-22 RX ADMIN — TIZANIDINE 4 MG: 4 TABLET ORAL at 05:07

## 2024-07-22 RX ADMIN — LIDOCAINE 5% 1 PATCH: 700 PATCH TOPICAL at 08:07

## 2024-07-22 RX ADMIN — HYDRALAZINE HYDROCHLORIDE 50 MG: 50 TABLET ORAL at 01:07

## 2024-07-22 RX ADMIN — HYDRALAZINE HYDROCHLORIDE 50 MG: 50 TABLET ORAL at 08:07

## 2024-07-22 RX ADMIN — ACETAMINOPHEN 650 MG: 325 TABLET ORAL at 02:07

## 2024-07-23 LAB
OHS QRS DURATION: 144 MS
OHS QRS DURATION: 146 MS
OHS QTC CALCULATION: 532 MS
OHS QTC CALCULATION: 533 MS
POCT GLUCOSE: 63 MG/DL (ref 70–110)
POCT GLUCOSE: 86 MG/DL (ref 70–110)
POCT GLUCOSE: 89 MG/DL (ref 70–110)
POCT GLUCOSE: 97 MG/DL (ref 70–110)

## 2024-07-23 PROCEDURE — 25000003 PHARM REV CODE 250: Mod: HCNC | Performed by: NURSE PRACTITIONER

## 2024-07-23 PROCEDURE — 97530 THERAPEUTIC ACTIVITIES: CPT | Mod: HCNC

## 2024-07-23 PROCEDURE — 63600175 PHARM REV CODE 636 W HCPCS: Mod: HCNC | Performed by: INTERNAL MEDICINE

## 2024-07-23 PROCEDURE — 25000003 PHARM REV CODE 250: Mod: HCNC | Performed by: FAMILY MEDICINE

## 2024-07-23 PROCEDURE — 97110 THERAPEUTIC EXERCISES: CPT | Mod: HCNC

## 2024-07-23 PROCEDURE — 97530 THERAPEUTIC ACTIVITIES: CPT | Mod: HCNC,CQ

## 2024-07-23 PROCEDURE — 21400001 HC TELEMETRY ROOM: Mod: HCNC

## 2024-07-23 PROCEDURE — 97116 GAIT TRAINING THERAPY: CPT | Mod: HCNC,CQ

## 2024-07-23 PROCEDURE — 25000003 PHARM REV CODE 250: Mod: HCNC

## 2024-07-23 PROCEDURE — 63600175 PHARM REV CODE 636 W HCPCS: Mod: HCNC | Performed by: HOSPITALIST

## 2024-07-23 RX ORDER — ONDANSETRON HYDROCHLORIDE 2 MG/ML
4 INJECTION, SOLUTION INTRAVENOUS EVERY 6 HOURS PRN
Status: DISCONTINUED | OUTPATIENT
Start: 2024-07-23 | End: 2024-07-26

## 2024-07-23 RX ORDER — FUROSEMIDE 20 MG/1
20 TABLET ORAL DAILY
Status: DISCONTINUED | OUTPATIENT
Start: 2024-07-24 | End: 2024-07-29 | Stop reason: HOSPADM

## 2024-07-23 RX ADMIN — MUPIROCIN: 20 OINTMENT TOPICAL at 08:07

## 2024-07-23 RX ADMIN — TIZANIDINE 4 MG: 4 TABLET ORAL at 02:07

## 2024-07-23 RX ADMIN — PREGABALIN 50 MG: 25 CAPSULE ORAL at 08:07

## 2024-07-23 RX ADMIN — TRAMADOL HYDROCHLORIDE 50 MG: 50 TABLET, COATED ORAL at 02:07

## 2024-07-23 RX ADMIN — CHLORHEXIDINE GLUCONATE 0.12% ORAL RINSE 15 ML: 1.2 LIQUID ORAL at 09:07

## 2024-07-23 RX ADMIN — NIFEDIPINE 30 MG: 30 TABLET, FILM COATED, EXTENDED RELEASE ORAL at 09:07

## 2024-07-23 RX ADMIN — CARVEDILOL 6.25 MG: 6.25 TABLET, FILM COATED ORAL at 09:07

## 2024-07-23 RX ADMIN — FAMOTIDINE 20 MG: 20 TABLET ORAL at 09:07

## 2024-07-23 RX ADMIN — HEPARIN SODIUM 5000 UNITS: 5000 INJECTION INTRAVENOUS; SUBCUTANEOUS at 02:07

## 2024-07-23 RX ADMIN — CYCLOSPORINE 75 MG: 25 CAPSULE, LIQUID FILLED ORAL at 06:07

## 2024-07-23 RX ADMIN — ATORVASTATIN CALCIUM 40 MG: 40 TABLET, FILM COATED ORAL at 09:07

## 2024-07-23 RX ADMIN — HYDRALAZINE HYDROCHLORIDE 50 MG: 50 TABLET ORAL at 08:07

## 2024-07-23 RX ADMIN — TIZANIDINE 4 MG: 4 TABLET ORAL at 05:07

## 2024-07-23 RX ADMIN — TRAMADOL HYDROCHLORIDE 50 MG: 50 TABLET, COATED ORAL at 09:07

## 2024-07-23 RX ADMIN — LIDOCAINE 5% 1 PATCH: 700 PATCH TOPICAL at 09:07

## 2024-07-23 RX ADMIN — HYDRALAZINE HYDROCHLORIDE 50 MG: 50 TABLET ORAL at 05:07

## 2024-07-23 RX ADMIN — TIZANIDINE 4 MG: 4 TABLET ORAL at 08:07

## 2024-07-23 RX ADMIN — MUPIROCIN: 20 OINTMENT TOPICAL at 09:07

## 2024-07-23 RX ADMIN — ONDANSETRON 4 MG: 2 INJECTION INTRAMUSCULAR; INTRAVENOUS at 07:07

## 2024-07-23 RX ADMIN — CYCLOSPORINE 75 MG: 25 CAPSULE, LIQUID FILLED ORAL at 09:07

## 2024-07-23 RX ADMIN — CARVEDILOL 6.25 MG: 6.25 TABLET, FILM COATED ORAL at 06:07

## 2024-07-23 RX ADMIN — PREGABALIN 50 MG: 25 CAPSULE ORAL at 09:07

## 2024-07-23 RX ADMIN — ZOLPIDEM TARTRATE 5 MG: 5 TABLET ORAL at 12:07

## 2024-07-23 RX ADMIN — HEPARIN SODIUM 5000 UNITS: 5000 INJECTION INTRAVENOUS; SUBCUTANEOUS at 08:07

## 2024-07-23 RX ADMIN — CHLORHEXIDINE GLUCONATE 0.12% ORAL RINSE 15 ML: 1.2 LIQUID ORAL at 08:07

## 2024-07-23 RX ADMIN — THERA TABS 1 TABLET: TAB at 09:07

## 2024-07-23 RX ADMIN — TRAMADOL HYDROCHLORIDE 50 MG: 50 TABLET, COATED ORAL at 08:07

## 2024-07-24 ENCOUNTER — TELEPHONE (OUTPATIENT)
Dept: TRANSPLANT | Facility: CLINIC | Age: 69
End: 2024-07-24
Payer: MEDICARE

## 2024-07-24 LAB
ALBUMIN SERPL BCP-MCNC: 3.2 G/DL (ref 3.5–5.2)
ALP SERPL-CCNC: 72 U/L (ref 55–135)
ALT SERPL W/O P-5'-P-CCNC: 18 U/L (ref 10–44)
ANION GAP SERPL CALC-SCNC: 10 MMOL/L (ref 8–16)
AST SERPL-CCNC: 32 U/L (ref 10–40)
BILIRUB SERPL-MCNC: 0.6 MG/DL (ref 0.1–1)
BUN SERPL-MCNC: 53 MG/DL (ref 8–23)
CALCIUM SERPL-MCNC: 8.3 MG/DL (ref 8.7–10.5)
CHLORIDE SERPL-SCNC: 107 MMOL/L (ref 95–110)
CO2 SERPL-SCNC: 20 MMOL/L (ref 23–29)
CREAT SERPL-MCNC: 3.1 MG/DL (ref 0.5–1.4)
ERYTHROCYTE [DISTWIDTH] IN BLOOD BY AUTOMATED COUNT: 15.3 % (ref 11.5–14.5)
EST. GFR  (NO RACE VARIABLE): 16 ML/MIN/1.73 M^2
GLUCOSE SERPL-MCNC: 103 MG/DL (ref 70–110)
HCT VFR BLD AUTO: 25.6 % (ref 37–48.5)
HGB BLD-MCNC: 8.2 G/DL (ref 12–16)
MCH RBC QN AUTO: 28.2 PG (ref 27–31)
MCHC RBC AUTO-ENTMCNC: 32 G/DL (ref 32–36)
MCV RBC AUTO: 88 FL (ref 82–98)
PLATELET # BLD AUTO: 156 K/UL (ref 150–450)
PMV BLD AUTO: 9.7 FL (ref 9.2–12.9)
POCT GLUCOSE: 115 MG/DL (ref 70–110)
POCT GLUCOSE: 136 MG/DL (ref 70–110)
POCT GLUCOSE: 68 MG/DL (ref 70–110)
POCT GLUCOSE: 98 MG/DL (ref 70–110)
POTASSIUM SERPL-SCNC: 4.3 MMOL/L (ref 3.5–5.1)
PROT SERPL-MCNC: 6.4 G/DL (ref 6–8.4)
RBC # BLD AUTO: 2.91 M/UL (ref 4–5.4)
SODIUM SERPL-SCNC: 137 MMOL/L (ref 136–145)
WBC # BLD AUTO: 2.93 K/UL (ref 3.9–12.7)

## 2024-07-24 PROCEDURE — 97530 THERAPEUTIC ACTIVITIES: CPT | Mod: HCNC

## 2024-07-24 PROCEDURE — 97116 GAIT TRAINING THERAPY: CPT | Mod: HCNC,CQ

## 2024-07-24 PROCEDURE — 25000003 PHARM REV CODE 250: Mod: HCNC

## 2024-07-24 PROCEDURE — 25000003 PHARM REV CODE 250: Mod: HCNC | Performed by: NURSE PRACTITIONER

## 2024-07-24 PROCEDURE — 21400001 HC TELEMETRY ROOM: Mod: HCNC

## 2024-07-24 PROCEDURE — 25000003 PHARM REV CODE 250: Mod: HCNC | Performed by: INTERNAL MEDICINE

## 2024-07-24 PROCEDURE — 36415 COLL VENOUS BLD VENIPUNCTURE: CPT | Mod: HCNC | Performed by: FAMILY MEDICINE

## 2024-07-24 PROCEDURE — 97530 THERAPEUTIC ACTIVITIES: CPT | Mod: HCNC,CQ

## 2024-07-24 PROCEDURE — 63600175 PHARM REV CODE 636 W HCPCS: Mod: HCNC | Performed by: INTERNAL MEDICINE

## 2024-07-24 PROCEDURE — 25000003 PHARM REV CODE 250: Mod: HCNC | Performed by: FAMILY MEDICINE

## 2024-07-24 PROCEDURE — 99223 1ST HOSP IP/OBS HIGH 75: CPT | Mod: HCNC,,, | Performed by: PHYSICIAN ASSISTANT

## 2024-07-24 PROCEDURE — 85027 COMPLETE CBC AUTOMATED: CPT | Mod: HCNC | Performed by: FAMILY MEDICINE

## 2024-07-24 PROCEDURE — 92507 TX SP LANG VOICE COMM INDIV: CPT | Mod: HCNC

## 2024-07-24 PROCEDURE — 80053 COMPREHEN METABOLIC PANEL: CPT | Mod: HCNC | Performed by: FAMILY MEDICINE

## 2024-07-24 RX ADMIN — HYDRALAZINE HYDROCHLORIDE 50 MG: 50 TABLET ORAL at 05:07

## 2024-07-24 RX ADMIN — CYCLOSPORINE 75 MG: 25 CAPSULE, LIQUID FILLED ORAL at 05:07

## 2024-07-24 RX ADMIN — HYDRALAZINE HYDROCHLORIDE 50 MG: 50 TABLET ORAL at 09:07

## 2024-07-24 RX ADMIN — CHLORHEXIDINE GLUCONATE 0.12% ORAL RINSE 15 ML: 1.2 LIQUID ORAL at 09:07

## 2024-07-24 RX ADMIN — LIDOCAINE 5% 1 PATCH: 700 PATCH TOPICAL at 09:07

## 2024-07-24 RX ADMIN — TRAMADOL HYDROCHLORIDE 50 MG: 50 TABLET, COATED ORAL at 03:07

## 2024-07-24 RX ADMIN — HEPARIN SODIUM 5000 UNITS: 5000 INJECTION INTRAVENOUS; SUBCUTANEOUS at 09:07

## 2024-07-24 RX ADMIN — MUPIROCIN: 20 OINTMENT TOPICAL at 09:07

## 2024-07-24 RX ADMIN — TIZANIDINE 4 MG: 4 TABLET ORAL at 09:07

## 2024-07-24 RX ADMIN — PREGABALIN 50 MG: 25 CAPSULE ORAL at 09:07

## 2024-07-24 RX ADMIN — TRAMADOL HYDROCHLORIDE 50 MG: 50 TABLET, COATED ORAL at 09:07

## 2024-07-24 RX ADMIN — HEPARIN SODIUM 5000 UNITS: 5000 INJECTION INTRAVENOUS; SUBCUTANEOUS at 03:07

## 2024-07-24 RX ADMIN — CARVEDILOL 6.25 MG: 6.25 TABLET, FILM COATED ORAL at 05:07

## 2024-07-24 RX ADMIN — ASPIRIN 81 MG: 81 TABLET, COATED ORAL at 09:07

## 2024-07-24 RX ADMIN — HEPARIN SODIUM 5000 UNITS: 5000 INJECTION INTRAVENOUS; SUBCUTANEOUS at 06:07

## 2024-07-24 RX ADMIN — ZOLPIDEM TARTRATE 5 MG: 5 TABLET ORAL at 12:07

## 2024-07-24 RX ADMIN — TIZANIDINE 4 MG: 4 TABLET ORAL at 03:07

## 2024-07-24 RX ADMIN — FAMOTIDINE 20 MG: 20 TABLET ORAL at 09:07

## 2024-07-24 RX ADMIN — TIZANIDINE 4 MG: 4 TABLET ORAL at 05:07

## 2024-07-24 RX ADMIN — OXYCODONE HYDROCHLORIDE 5 MG: 5 TABLET ORAL at 05:07

## 2024-07-24 RX ADMIN — THERA TABS 1 TABLET: TAB at 09:07

## 2024-07-24 RX ADMIN — HYDRALAZINE HYDROCHLORIDE 50 MG: 50 TABLET ORAL at 03:07

## 2024-07-24 RX ADMIN — CYCLOSPORINE 75 MG: 25 CAPSULE, LIQUID FILLED ORAL at 09:07

## 2024-07-24 RX ADMIN — ATORVASTATIN CALCIUM 40 MG: 40 TABLET, FILM COATED ORAL at 09:07

## 2024-07-24 NOTE — TELEPHONE ENCOUNTER
Spoke with pt on the phone, he speech is clear but slowly, I reviewed that I have been looking at her results. She will be going to the [facility] pending approval a per pt.

## 2024-07-25 LAB
POCT GLUCOSE: 104 MG/DL (ref 70–110)
POCT GLUCOSE: 105 MG/DL (ref 70–110)
POCT GLUCOSE: 112 MG/DL (ref 70–110)
POCT GLUCOSE: 127 MG/DL (ref 70–110)

## 2024-07-25 PROCEDURE — 21400001 HC TELEMETRY ROOM: Mod: HCNC

## 2024-07-25 PROCEDURE — 25000003 PHARM REV CODE 250: Mod: HCNC

## 2024-07-25 PROCEDURE — 93005 ELECTROCARDIOGRAM TRACING: CPT | Mod: HCNC

## 2024-07-25 PROCEDURE — 97535 SELF CARE MNGMENT TRAINING: CPT | Mod: HCNC

## 2024-07-25 PROCEDURE — 63600175 PHARM REV CODE 636 W HCPCS: Mod: HCNC | Performed by: HOSPITALIST

## 2024-07-25 PROCEDURE — 97530 THERAPEUTIC ACTIVITIES: CPT | Mod: HCNC

## 2024-07-25 PROCEDURE — 25000003 PHARM REV CODE 250: Mod: HCNC | Performed by: FAMILY MEDICINE

## 2024-07-25 PROCEDURE — 25000003 PHARM REV CODE 250: Mod: HCNC | Performed by: NURSE PRACTITIONER

## 2024-07-25 PROCEDURE — 97530 THERAPEUTIC ACTIVITIES: CPT | Mod: HCNC,CQ

## 2024-07-25 PROCEDURE — 97116 GAIT TRAINING THERAPY: CPT | Mod: HCNC,CQ

## 2024-07-25 PROCEDURE — 63600175 PHARM REV CODE 636 W HCPCS: Mod: HCNC

## 2024-07-25 PROCEDURE — 92507 TX SP LANG VOICE COMM INDIV: CPT | Mod: HCNC

## 2024-07-25 PROCEDURE — 25000003 PHARM REV CODE 250: Mod: HCNC | Performed by: INTERNAL MEDICINE

## 2024-07-25 PROCEDURE — 63600175 PHARM REV CODE 636 W HCPCS: Mod: HCNC | Performed by: INTERNAL MEDICINE

## 2024-07-25 PROCEDURE — 93010 ELECTROCARDIOGRAM REPORT: CPT | Mod: HCNC,,, | Performed by: INTERNAL MEDICINE

## 2024-07-25 RX ORDER — LORAZEPAM 2 MG/ML
1 INJECTION INTRAMUSCULAR ONCE
Status: DISCONTINUED | OUTPATIENT
Start: 2024-07-25 | End: 2024-07-29 | Stop reason: HOSPADM

## 2024-07-25 RX ORDER — LORAZEPAM 2 MG/ML
INJECTION INTRAMUSCULAR
Status: COMPLETED
Start: 2024-07-25 | End: 2024-07-25

## 2024-07-25 RX ORDER — HYDRALAZINE HYDROCHLORIDE 20 MG/ML
10 INJECTION INTRAMUSCULAR; INTRAVENOUS EVERY 6 HOURS PRN
Status: DISCONTINUED | OUTPATIENT
Start: 2024-07-25 | End: 2024-07-29 | Stop reason: HOSPADM

## 2024-07-25 RX ADMIN — TRAMADOL HYDROCHLORIDE 50 MG: 50 TABLET, COATED ORAL at 04:07

## 2024-07-25 RX ADMIN — ACETAMINOPHEN 650 MG: 325 TABLET ORAL at 05:07

## 2024-07-25 RX ADMIN — HYDRALAZINE HYDROCHLORIDE 50 MG: 50 TABLET ORAL at 09:07

## 2024-07-25 RX ADMIN — TRAMADOL HYDROCHLORIDE 50 MG: 50 TABLET, COATED ORAL at 08:07

## 2024-07-25 RX ADMIN — CYCLOSPORINE 75 MG: 25 CAPSULE, LIQUID FILLED ORAL at 05:07

## 2024-07-25 RX ADMIN — LORAZEPAM 2 MG: 2 INJECTION INTRAMUSCULAR; INTRAVENOUS at 04:07

## 2024-07-25 RX ADMIN — HYDRALAZINE HYDROCHLORIDE 50 MG: 50 TABLET ORAL at 06:07

## 2024-07-25 RX ADMIN — PREGABALIN 50 MG: 25 CAPSULE ORAL at 08:07

## 2024-07-25 RX ADMIN — CARVEDILOL 6.25 MG: 6.25 TABLET, FILM COATED ORAL at 05:07

## 2024-07-25 RX ADMIN — CYCLOSPORINE 75 MG: 25 CAPSULE, LIQUID FILLED ORAL at 08:07

## 2024-07-25 RX ADMIN — ZOLPIDEM TARTRATE 5 MG: 5 TABLET ORAL at 12:07

## 2024-07-25 RX ADMIN — THERA TABS 1 TABLET: TAB at 08:07

## 2024-07-25 RX ADMIN — ASPIRIN 81 MG: 81 TABLET, COATED ORAL at 08:07

## 2024-07-25 RX ADMIN — HEPARIN SODIUM 5000 UNITS: 5000 INJECTION INTRAVENOUS; SUBCUTANEOUS at 04:07

## 2024-07-25 RX ADMIN — FAMOTIDINE 20 MG: 20 TABLET ORAL at 08:07

## 2024-07-25 RX ADMIN — HYDRALAZINE HYDROCHLORIDE 50 MG: 50 TABLET ORAL at 04:07

## 2024-07-25 RX ADMIN — TIZANIDINE 4 MG: 4 TABLET ORAL at 06:07

## 2024-07-25 RX ADMIN — TIZANIDINE 4 MG: 4 TABLET ORAL at 09:07

## 2024-07-25 RX ADMIN — LIDOCAINE 5% 1 PATCH: 700 PATCH TOPICAL at 08:07

## 2024-07-25 RX ADMIN — ONDANSETRON 4 MG: 2 INJECTION INTRAMUSCULAR; INTRAVENOUS at 12:07

## 2024-07-25 RX ADMIN — TIZANIDINE 4 MG: 4 TABLET ORAL at 04:07

## 2024-07-25 RX ADMIN — HEPARIN SODIUM 5000 UNITS: 5000 INJECTION INTRAVENOUS; SUBCUTANEOUS at 09:07

## 2024-07-25 RX ADMIN — HEPARIN SODIUM 5000 UNITS: 5000 INJECTION INTRAVENOUS; SUBCUTANEOUS at 06:07

## 2024-07-25 RX ADMIN — ATORVASTATIN CALCIUM 40 MG: 40 TABLET, FILM COATED ORAL at 08:07

## 2024-07-26 PROBLEM — R94.31 PROLONGED Q-T INTERVAL ON ECG: Status: ACTIVE | Noted: 2024-07-26

## 2024-07-26 LAB
OHS QRS DURATION: 150 MS
OHS QTC CALCULATION: 541 MS
POCT GLUCOSE: 105 MG/DL (ref 70–110)
POCT GLUCOSE: 82 MG/DL (ref 70–110)
POCT GLUCOSE: 95 MG/DL (ref 70–110)
POCT GLUCOSE: 97 MG/DL (ref 70–110)

## 2024-07-26 PROCEDURE — 25000003 PHARM REV CODE 250: Mod: HCNC | Performed by: INTERNAL MEDICINE

## 2024-07-26 PROCEDURE — 97530 THERAPEUTIC ACTIVITIES: CPT | Mod: HCNC

## 2024-07-26 PROCEDURE — 63600175 PHARM REV CODE 636 W HCPCS: Mod: HCNC | Performed by: INTERNAL MEDICINE

## 2024-07-26 PROCEDURE — 21400001 HC TELEMETRY ROOM: Mod: HCNC

## 2024-07-26 PROCEDURE — 63600175 PHARM REV CODE 636 W HCPCS: Mod: HCNC | Performed by: HOSPITALIST

## 2024-07-26 PROCEDURE — 25000003 PHARM REV CODE 250: Mod: HCNC | Performed by: NURSE PRACTITIONER

## 2024-07-26 PROCEDURE — 25000003 PHARM REV CODE 250: Mod: HCNC

## 2024-07-26 PROCEDURE — 25000003 PHARM REV CODE 250: Mod: HCNC | Performed by: FAMILY MEDICINE

## 2024-07-26 RX ORDER — ALPRAZOLAM 0.25 MG/1
0.25 TABLET ORAL 2 TIMES DAILY PRN
Status: DISCONTINUED | OUTPATIENT
Start: 2024-07-26 | End: 2024-07-27

## 2024-07-26 RX ORDER — ALPRAZOLAM 0.25 MG/1
0.25 TABLET ORAL 2 TIMES DAILY PRN
Status: DISCONTINUED | OUTPATIENT
Start: 2024-07-26 | End: 2024-07-26

## 2024-07-26 RX ORDER — DIPHENHYDRAMINE HCL 50 MG
50 CAPSULE ORAL EVERY 6 HOURS PRN
Status: DISCONTINUED | OUTPATIENT
Start: 2024-07-26 | End: 2024-07-29 | Stop reason: HOSPADM

## 2024-07-26 RX ADMIN — HEPARIN SODIUM 5000 UNITS: 5000 INJECTION INTRAVENOUS; SUBCUTANEOUS at 05:07

## 2024-07-26 RX ADMIN — PREGABALIN 50 MG: 25 CAPSULE ORAL at 12:07

## 2024-07-26 RX ADMIN — ZOLPIDEM TARTRATE 5 MG: 5 TABLET ORAL at 09:07

## 2024-07-26 RX ADMIN — NIFEDIPINE 30 MG: 30 TABLET, FILM COATED, EXTENDED RELEASE ORAL at 12:07

## 2024-07-26 RX ADMIN — ASPIRIN 81 MG: 81 TABLET, COATED ORAL at 12:07

## 2024-07-26 RX ADMIN — CARVEDILOL 6.25 MG: 6.25 TABLET, FILM COATED ORAL at 12:07

## 2024-07-26 RX ADMIN — ZOLPIDEM TARTRATE 5 MG: 5 TABLET ORAL at 01:07

## 2024-07-26 RX ADMIN — HEPARIN SODIUM 5000 UNITS: 5000 INJECTION INTRAVENOUS; SUBCUTANEOUS at 02:07

## 2024-07-26 RX ADMIN — TIZANIDINE 4 MG: 4 TABLET ORAL at 05:07

## 2024-07-26 RX ADMIN — CYCLOSPORINE 75 MG: 25 CAPSULE, LIQUID FILLED ORAL at 12:07

## 2024-07-26 RX ADMIN — LIDOCAINE 5% 1 PATCH: 700 PATCH TOPICAL at 10:07

## 2024-07-26 RX ADMIN — CYCLOSPORINE 75 MG: 25 CAPSULE, LIQUID FILLED ORAL at 06:07

## 2024-07-26 RX ADMIN — HYDRALAZINE HYDROCHLORIDE 50 MG: 50 TABLET ORAL at 02:07

## 2024-07-26 RX ADMIN — HYDRALAZINE HYDROCHLORIDE 50 MG: 50 TABLET ORAL at 05:07

## 2024-07-26 RX ADMIN — TRAMADOL HYDROCHLORIDE 50 MG: 50 TABLET, COATED ORAL at 09:07

## 2024-07-26 RX ADMIN — TRAMADOL HYDROCHLORIDE 50 MG: 50 TABLET, COATED ORAL at 12:07

## 2024-07-26 RX ADMIN — FAMOTIDINE 20 MG: 20 TABLET ORAL at 12:07

## 2024-07-26 RX ADMIN — ONDANSETRON 4 MG: 2 INJECTION INTRAMUSCULAR; INTRAVENOUS at 11:07

## 2024-07-26 RX ADMIN — TIZANIDINE 4 MG: 4 TABLET ORAL at 02:07

## 2024-07-26 RX ADMIN — ATORVASTATIN CALCIUM 40 MG: 40 TABLET, FILM COATED ORAL at 12:07

## 2024-07-26 RX ADMIN — PREGABALIN 50 MG: 25 CAPSULE ORAL at 09:07

## 2024-07-26 RX ADMIN — FUROSEMIDE 20 MG: 20 TABLET ORAL at 12:07

## 2024-07-26 RX ADMIN — TIZANIDINE 4 MG: 4 TABLET ORAL at 09:07

## 2024-07-26 RX ADMIN — HYDRALAZINE HYDROCHLORIDE 50 MG: 50 TABLET ORAL at 09:07

## 2024-07-26 RX ADMIN — HEPARIN SODIUM 5000 UNITS: 5000 INJECTION INTRAVENOUS; SUBCUTANEOUS at 09:07

## 2024-07-27 LAB
POCT GLUCOSE: 100 MG/DL (ref 70–110)
POCT GLUCOSE: 107 MG/DL (ref 70–110)
POCT GLUCOSE: 82 MG/DL (ref 70–110)

## 2024-07-27 PROCEDURE — 25000003 PHARM REV CODE 250: Mod: HCNC | Performed by: FAMILY MEDICINE

## 2024-07-27 PROCEDURE — 97530 THERAPEUTIC ACTIVITIES: CPT | Mod: HCNC,CQ

## 2024-07-27 PROCEDURE — 21400001 HC TELEMETRY ROOM: Mod: HCNC

## 2024-07-27 PROCEDURE — 25000003 PHARM REV CODE 250: Mod: HCNC | Performed by: INTERNAL MEDICINE

## 2024-07-27 PROCEDURE — 25000003 PHARM REV CODE 250: Mod: HCNC | Performed by: NURSE PRACTITIONER

## 2024-07-27 PROCEDURE — 63600175 PHARM REV CODE 636 W HCPCS: Mod: HCNC | Performed by: INTERNAL MEDICINE

## 2024-07-27 PROCEDURE — 97116 GAIT TRAINING THERAPY: CPT | Mod: HCNC,CQ

## 2024-07-27 PROCEDURE — 25000003 PHARM REV CODE 250: Mod: HCNC

## 2024-07-27 RX ORDER — POLYETHYLENE GLYCOL 3350 17 G/17G
17 POWDER, FOR SOLUTION ORAL DAILY
Status: DISCONTINUED | OUTPATIENT
Start: 2024-07-27 | End: 2024-07-29 | Stop reason: HOSPADM

## 2024-07-27 RX ORDER — AMOXICILLIN 250 MG
1 CAPSULE ORAL 2 TIMES DAILY
Status: DISCONTINUED | OUTPATIENT
Start: 2024-07-27 | End: 2024-07-29 | Stop reason: HOSPADM

## 2024-07-27 RX ORDER — SCOLOPAMINE TRANSDERMAL SYSTEM 1 MG/1
1 PATCH, EXTENDED RELEASE TRANSDERMAL
Status: DISCONTINUED | OUTPATIENT
Start: 2024-07-27 | End: 2024-07-29 | Stop reason: HOSPADM

## 2024-07-27 RX ADMIN — TRAMADOL HYDROCHLORIDE 50 MG: 50 TABLET, COATED ORAL at 03:07

## 2024-07-27 RX ADMIN — SENNOSIDES AND DOCUSATE SODIUM 1 TABLET: 50; 8.6 TABLET ORAL at 12:07

## 2024-07-27 RX ADMIN — LIDOCAINE 5% 1 PATCH: 700 PATCH TOPICAL at 10:07

## 2024-07-27 RX ADMIN — PREGABALIN 50 MG: 25 CAPSULE ORAL at 09:07

## 2024-07-27 RX ADMIN — DIPHENHYDRAMINE HYDROCHLORIDE 50 MG: 50 CAPSULE ORAL at 08:07

## 2024-07-27 RX ADMIN — TIZANIDINE 4 MG: 4 TABLET ORAL at 06:07

## 2024-07-27 RX ADMIN — FAMOTIDINE 20 MG: 20 TABLET ORAL at 11:07

## 2024-07-27 RX ADMIN — ZOLPIDEM TARTRATE 5 MG: 5 TABLET ORAL at 11:07

## 2024-07-27 RX ADMIN — HEPARIN SODIUM 5000 UNITS: 5000 INJECTION INTRAVENOUS; SUBCUTANEOUS at 09:07

## 2024-07-27 RX ADMIN — POLYETHYLENE GLYCOL 3350 17 G: 17 POWDER, FOR SOLUTION ORAL at 12:07

## 2024-07-27 RX ADMIN — SCOPOLAMINE 1 PATCH: 1.5 PATCH, EXTENDED RELEASE TRANSDERMAL at 10:07

## 2024-07-27 RX ADMIN — HEPARIN SODIUM 5000 UNITS: 5000 INJECTION INTRAVENOUS; SUBCUTANEOUS at 03:07

## 2024-07-27 RX ADMIN — SENNOSIDES AND DOCUSATE SODIUM 1 TABLET: 50; 8.6 TABLET ORAL at 09:07

## 2024-07-27 RX ADMIN — CYCLOSPORINE 75 MG: 25 CAPSULE, LIQUID FILLED ORAL at 08:07

## 2024-07-27 RX ADMIN — HEPARIN SODIUM 5000 UNITS: 5000 INJECTION INTRAVENOUS; SUBCUTANEOUS at 06:07

## 2024-07-27 RX ADMIN — NIFEDIPINE 30 MG: 30 TABLET, FILM COATED, EXTENDED RELEASE ORAL at 11:07

## 2024-07-27 RX ADMIN — TIZANIDINE 4 MG: 4 TABLET ORAL at 03:07

## 2024-07-27 RX ADMIN — CARVEDILOL 6.25 MG: 6.25 TABLET, FILM COATED ORAL at 05:07

## 2024-07-27 RX ADMIN — HYDRALAZINE HYDROCHLORIDE 50 MG: 50 TABLET ORAL at 09:07

## 2024-07-27 RX ADMIN — THERA TABS 1 TABLET: TAB at 11:07

## 2024-07-27 RX ADMIN — PREGABALIN 50 MG: 25 CAPSULE ORAL at 11:07

## 2024-07-27 RX ADMIN — TRAMADOL HYDROCHLORIDE 50 MG: 50 TABLET, COATED ORAL at 09:07

## 2024-07-27 RX ADMIN — TRAMADOL HYDROCHLORIDE 50 MG: 50 TABLET, COATED ORAL at 11:07

## 2024-07-27 RX ADMIN — CYCLOSPORINE 75 MG: 25 CAPSULE, LIQUID FILLED ORAL at 06:07

## 2024-07-27 RX ADMIN — ASPIRIN 81 MG: 81 TABLET, COATED ORAL at 11:07

## 2024-07-27 RX ADMIN — HYDRALAZINE HYDROCHLORIDE 50 MG: 50 TABLET ORAL at 06:07

## 2024-07-27 RX ADMIN — FUROSEMIDE 20 MG: 20 TABLET ORAL at 11:07

## 2024-07-27 RX ADMIN — ATORVASTATIN CALCIUM 40 MG: 40 TABLET, FILM COATED ORAL at 11:07

## 2024-07-27 RX ADMIN — TIZANIDINE 4 MG: 4 TABLET ORAL at 09:07

## 2024-07-27 RX ADMIN — HYDRALAZINE HYDROCHLORIDE 50 MG: 50 TABLET ORAL at 03:07

## 2024-07-28 LAB
ALBUMIN SERPL BCP-MCNC: 3.7 G/DL (ref 3.5–5.2)
ALP SERPL-CCNC: 97 U/L (ref 55–135)
ALT SERPL W/O P-5'-P-CCNC: 21 U/L (ref 10–44)
ANION GAP SERPL CALC-SCNC: 12 MMOL/L (ref 8–16)
AST SERPL-CCNC: 40 U/L (ref 10–40)
BILIRUB SERPL-MCNC: 0.9 MG/DL (ref 0.1–1)
BUN SERPL-MCNC: 46 MG/DL (ref 8–23)
CALCIUM SERPL-MCNC: 8.7 MG/DL (ref 8.7–10.5)
CHLORIDE SERPL-SCNC: 104 MMOL/L (ref 95–110)
CO2 SERPL-SCNC: 17 MMOL/L (ref 23–29)
CREAT SERPL-MCNC: 3 MG/DL (ref 0.5–1.4)
ERYTHROCYTE [DISTWIDTH] IN BLOOD BY AUTOMATED COUNT: 15.7 % (ref 11.5–14.5)
EST. GFR  (NO RACE VARIABLE): 16 ML/MIN/1.73 M^2
GLUCOSE SERPL-MCNC: 74 MG/DL (ref 70–110)
HCT VFR BLD AUTO: 29.9 % (ref 37–48.5)
HGB BLD-MCNC: 9.5 G/DL (ref 12–16)
MAGNESIUM SERPL-MCNC: 1.7 MG/DL (ref 1.6–2.6)
MCH RBC QN AUTO: 28.3 PG (ref 27–31)
MCHC RBC AUTO-ENTMCNC: 31.8 G/DL (ref 32–36)
MCV RBC AUTO: 89 FL (ref 82–98)
PLATELET # BLD AUTO: 197 K/UL (ref 150–450)
PMV BLD AUTO: 10 FL (ref 9.2–12.9)
POCT GLUCOSE: 106 MG/DL (ref 70–110)
POCT GLUCOSE: 141 MG/DL (ref 70–110)
POCT GLUCOSE: 93 MG/DL (ref 70–110)
POTASSIUM SERPL-SCNC: 5 MMOL/L (ref 3.5–5.1)
PROT SERPL-MCNC: 7.4 G/DL (ref 6–8.4)
RBC # BLD AUTO: 3.36 M/UL (ref 4–5.4)
SODIUM SERPL-SCNC: 133 MMOL/L (ref 136–145)
WBC # BLD AUTO: 4.03 K/UL (ref 3.9–12.7)

## 2024-07-28 PROCEDURE — 25000003 PHARM REV CODE 250: Mod: HCNC

## 2024-07-28 PROCEDURE — 83735 ASSAY OF MAGNESIUM: CPT | Mod: HCNC | Performed by: FAMILY MEDICINE

## 2024-07-28 PROCEDURE — 25000003 PHARM REV CODE 250: Mod: HCNC | Performed by: FAMILY MEDICINE

## 2024-07-28 PROCEDURE — 80053 COMPREHEN METABOLIC PANEL: CPT | Mod: HCNC | Performed by: FAMILY MEDICINE

## 2024-07-28 PROCEDURE — 36415 COLL VENOUS BLD VENIPUNCTURE: CPT | Mod: HCNC | Performed by: FAMILY MEDICINE

## 2024-07-28 PROCEDURE — 25000003 PHARM REV CODE 250: Mod: HCNC | Performed by: INTERNAL MEDICINE

## 2024-07-28 PROCEDURE — 63600175 PHARM REV CODE 636 W HCPCS: Mod: HCNC | Performed by: INTERNAL MEDICINE

## 2024-07-28 PROCEDURE — 25000003 PHARM REV CODE 250: Mod: HCNC | Performed by: NURSE PRACTITIONER

## 2024-07-28 PROCEDURE — 85027 COMPLETE CBC AUTOMATED: CPT | Mod: HCNC | Performed by: FAMILY MEDICINE

## 2024-07-28 PROCEDURE — 21400001 HC TELEMETRY ROOM: Mod: HCNC

## 2024-07-28 RX ADMIN — FUROSEMIDE 20 MG: 20 TABLET ORAL at 08:07

## 2024-07-28 RX ADMIN — TIZANIDINE 4 MG: 4 TABLET ORAL at 09:07

## 2024-07-28 RX ADMIN — TRAMADOL HYDROCHLORIDE 50 MG: 50 TABLET, COATED ORAL at 08:07

## 2024-07-28 RX ADMIN — NIFEDIPINE 30 MG: 30 TABLET, FILM COATED, EXTENDED RELEASE ORAL at 08:07

## 2024-07-28 RX ADMIN — HYDRALAZINE HYDROCHLORIDE 50 MG: 50 TABLET ORAL at 07:07

## 2024-07-28 RX ADMIN — SENNOSIDES AND DOCUSATE SODIUM 1 TABLET: 50; 8.6 TABLET ORAL at 09:07

## 2024-07-28 RX ADMIN — CARVEDILOL 6.25 MG: 6.25 TABLET, FILM COATED ORAL at 04:07

## 2024-07-28 RX ADMIN — HEPARIN SODIUM 5000 UNITS: 5000 INJECTION INTRAVENOUS; SUBCUTANEOUS at 09:07

## 2024-07-28 RX ADMIN — POLYETHYLENE GLYCOL 3350 17 G: 17 POWDER, FOR SOLUTION ORAL at 08:07

## 2024-07-28 RX ADMIN — PREGABALIN 50 MG: 25 CAPSULE ORAL at 08:07

## 2024-07-28 RX ADMIN — CYCLOSPORINE 75 MG: 25 CAPSULE, LIQUID FILLED ORAL at 08:07

## 2024-07-28 RX ADMIN — PREGABALIN 50 MG: 25 CAPSULE ORAL at 09:07

## 2024-07-28 RX ADMIN — HEPARIN SODIUM 5000 UNITS: 5000 INJECTION INTRAVENOUS; SUBCUTANEOUS at 07:07

## 2024-07-28 RX ADMIN — LIDOCAINE 5% 1 PATCH: 700 PATCH TOPICAL at 08:07

## 2024-07-28 RX ADMIN — CYCLOSPORINE 75 MG: 25 CAPSULE, LIQUID FILLED ORAL at 05:07

## 2024-07-28 RX ADMIN — ATORVASTATIN CALCIUM 40 MG: 40 TABLET, FILM COATED ORAL at 08:07

## 2024-07-28 RX ADMIN — TRAMADOL HYDROCHLORIDE 50 MG: 50 TABLET, COATED ORAL at 09:07

## 2024-07-28 RX ADMIN — HYDRALAZINE HYDROCHLORIDE 50 MG: 50 TABLET ORAL at 09:07

## 2024-07-28 RX ADMIN — SENNOSIDES AND DOCUSATE SODIUM 1 TABLET: 50; 8.6 TABLET ORAL at 08:07

## 2024-07-28 RX ADMIN — ZOLPIDEM TARTRATE 5 MG: 5 TABLET ORAL at 11:07

## 2024-07-28 RX ADMIN — FAMOTIDINE 20 MG: 20 TABLET ORAL at 08:07

## 2024-07-28 RX ADMIN — DIPHENHYDRAMINE HYDROCHLORIDE 50 MG: 50 CAPSULE ORAL at 04:07

## 2024-07-28 RX ADMIN — ASPIRIN 81 MG: 81 TABLET, COATED ORAL at 08:07

## 2024-07-28 RX ADMIN — TIZANIDINE 4 MG: 4 TABLET ORAL at 02:07

## 2024-07-28 RX ADMIN — HYDRALAZINE HYDROCHLORIDE 50 MG: 50 TABLET ORAL at 02:07

## 2024-07-28 RX ADMIN — HEPARIN SODIUM 5000 UNITS: 5000 INJECTION INTRAVENOUS; SUBCUTANEOUS at 02:07

## 2024-07-28 RX ADMIN — CARVEDILOL 6.25 MG: 6.25 TABLET, FILM COATED ORAL at 07:07

## 2024-07-28 RX ADMIN — THERA TABS 1 TABLET: TAB at 08:07

## 2024-07-28 RX ADMIN — TIZANIDINE 4 MG: 4 TABLET ORAL at 07:07

## 2024-07-28 RX ADMIN — TRAMADOL HYDROCHLORIDE 50 MG: 50 TABLET, COATED ORAL at 02:07

## 2024-07-29 VITALS
TEMPERATURE: 99 F | RESPIRATION RATE: 18 BRPM | HEIGHT: 62 IN | OXYGEN SATURATION: 100 % | WEIGHT: 163.13 LBS | DIASTOLIC BLOOD PRESSURE: 64 MMHG | HEART RATE: 88 BPM | SYSTOLIC BLOOD PRESSURE: 107 MMHG | BODY MASS INDEX: 30.02 KG/M2

## 2024-07-29 LAB — POCT GLUCOSE: 87 MG/DL (ref 70–110)

## 2024-07-29 PROCEDURE — 25000003 PHARM REV CODE 250: Mod: HCNC | Performed by: INTERNAL MEDICINE

## 2024-07-29 PROCEDURE — 97530 THERAPEUTIC ACTIVITIES: CPT | Mod: HCNC,CQ

## 2024-07-29 PROCEDURE — 97535 SELF CARE MNGMENT TRAINING: CPT | Mod: HCNC

## 2024-07-29 PROCEDURE — 25000003 PHARM REV CODE 250: Mod: HCNC

## 2024-07-29 PROCEDURE — 25000003 PHARM REV CODE 250: Mod: HCNC | Performed by: FAMILY MEDICINE

## 2024-07-29 PROCEDURE — 63600175 PHARM REV CODE 636 W HCPCS: Mod: HCNC | Performed by: INTERNAL MEDICINE

## 2024-07-29 PROCEDURE — 97116 GAIT TRAINING THERAPY: CPT | Mod: HCNC,CQ

## 2024-07-29 PROCEDURE — 25000003 PHARM REV CODE 250: Mod: HCNC | Performed by: NURSE PRACTITIONER

## 2024-07-29 PROCEDURE — 97530 THERAPEUTIC ACTIVITIES: CPT | Mod: HCNC

## 2024-07-29 RX ORDER — ASPIRIN 81 MG/1
81 TABLET ORAL DAILY
Start: 2024-07-30 | End: 2025-07-30

## 2024-07-29 RX ORDER — TRAMADOL HYDROCHLORIDE 50 MG/1
50 TABLET ORAL 3 TIMES DAILY
Qty: 7 EACH | Refills: 0 | Status: SHIPPED | OUTPATIENT
Start: 2024-07-29

## 2024-07-29 RX ORDER — CARVEDILOL 6.25 MG/1
6.25 TABLET ORAL 2 TIMES DAILY WITH MEALS
Qty: 180 TABLET | Refills: 3 | Status: SHIPPED | OUTPATIENT
Start: 2024-07-29 | End: 2025-07-29

## 2024-07-29 RX ORDER — ATORVASTATIN CALCIUM 40 MG/1
40 TABLET, FILM COATED ORAL DAILY
Start: 2024-07-30 | End: 2024-08-29

## 2024-07-29 RX ORDER — ZOLPIDEM TARTRATE 5 MG/1
5 TABLET ORAL NIGHTLY PRN
Qty: 7 TABLET | Refills: 0 | Status: SHIPPED | OUTPATIENT
Start: 2024-07-29

## 2024-07-29 RX ORDER — OXYCODONE AND ACETAMINOPHEN 5; 325 MG/1; MG/1
1 TABLET ORAL EVERY 4 HOURS PRN
Qty: 7 TABLET | Refills: 0 | Status: SHIPPED | OUTPATIENT
Start: 2024-07-29

## 2024-07-29 RX ORDER — TIZANIDINE 4 MG/1
4 TABLET ORAL EVERY 6 HOURS PRN
Qty: 30 TABLET | Refills: 0 | Status: SHIPPED | OUTPATIENT
Start: 2024-07-29 | End: 2024-08-08

## 2024-07-29 RX ORDER — SCOLOPAMINE TRANSDERMAL SYSTEM 1 MG/1
1 PATCH, EXTENDED RELEASE TRANSDERMAL
Start: 2024-07-30

## 2024-07-29 RX ORDER — HYDRALAZINE HYDROCHLORIDE 50 MG/1
50 TABLET, FILM COATED ORAL EVERY 8 HOURS
Start: 2024-07-29 | End: 2024-08-09

## 2024-07-29 RX ADMIN — NIFEDIPINE 30 MG: 30 TABLET, FILM COATED, EXTENDED RELEASE ORAL at 08:07

## 2024-07-29 RX ADMIN — OXYCODONE HYDROCHLORIDE 5 MG: 5 TABLET ORAL at 03:07

## 2024-07-29 RX ADMIN — HYDRALAZINE HYDROCHLORIDE 50 MG: 50 TABLET ORAL at 05:07

## 2024-07-29 RX ADMIN — LIDOCAINE 5% 1 PATCH: 700 PATCH TOPICAL at 08:07

## 2024-07-29 RX ADMIN — SENNOSIDES AND DOCUSATE SODIUM 1 TABLET: 50; 8.6 TABLET ORAL at 08:07

## 2024-07-29 RX ADMIN — ATORVASTATIN CALCIUM 40 MG: 40 TABLET, FILM COATED ORAL at 08:07

## 2024-07-29 RX ADMIN — TRAMADOL HYDROCHLORIDE 50 MG: 50 TABLET, COATED ORAL at 08:07

## 2024-07-29 RX ADMIN — HEPARIN SODIUM 5000 UNITS: 5000 INJECTION INTRAVENOUS; SUBCUTANEOUS at 05:07

## 2024-07-29 RX ADMIN — CYCLOSPORINE 75 MG: 25 CAPSULE, LIQUID FILLED ORAL at 07:07

## 2024-07-29 RX ADMIN — TIZANIDINE 4 MG: 4 TABLET ORAL at 05:07

## 2024-07-29 RX ADMIN — POLYETHYLENE GLYCOL 3350 17 G: 17 POWDER, FOR SOLUTION ORAL at 08:07

## 2024-07-29 RX ADMIN — FUROSEMIDE 20 MG: 20 TABLET ORAL at 08:07

## 2024-07-29 RX ADMIN — PREGABALIN 50 MG: 25 CAPSULE ORAL at 08:07

## 2024-07-29 RX ADMIN — CARVEDILOL 6.25 MG: 6.25 TABLET, FILM COATED ORAL at 07:07

## 2024-07-29 RX ADMIN — ASPIRIN 81 MG: 81 TABLET, COATED ORAL at 08:07

## 2024-07-29 RX ADMIN — FAMOTIDINE 20 MG: 20 TABLET ORAL at 08:07

## 2024-07-29 RX ADMIN — THERA TABS 1 TABLET: TAB at 08:07

## 2024-07-30 ENCOUNTER — TELEPHONE (OUTPATIENT)
Dept: PRIMARY CARE CLINIC | Facility: CLINIC | Age: 69
End: 2024-07-30
Payer: MEDICARE

## 2024-07-30 NOTE — TELEPHONE ENCOUNTER
Spoke w Ms. Damon - transferred to [facility] last night  R leg still weak w foot drag - speech improving   Unfortunately back pain limiting how intensive can do therapy    Talking w son by phone daily and Rastafarian family visiting daily bringing clothes, snacks etc    Anticipating 2-3 week stay

## 2024-08-07 ENCOUNTER — TELEPHONE (OUTPATIENT)
Dept: NEUROSURGERY | Facility: CLINIC | Age: 69
End: 2024-08-07
Payer: MEDICARE

## 2024-08-08 ENCOUNTER — TELEPHONE (OUTPATIENT)
Dept: PRIMARY CARE CLINIC | Facility: CLINIC | Age: 69
End: 2024-08-08
Payer: MEDICARE

## 2024-08-08 ENCOUNTER — TELEPHONE (OUTPATIENT)
Dept: NEUROSURGERY | Facility: CLINIC | Age: 69
End: 2024-08-08
Payer: MEDICARE

## 2024-08-09 DIAGNOSIS — I10 ESSENTIAL HYPERTENSION: Chronic | ICD-10-CM

## 2024-08-09 RX ORDER — HYDRALAZINE HYDROCHLORIDE 50 MG/1
50 TABLET, FILM COATED ORAL EVERY 8 HOURS
Qty: 270 TABLET | Refills: 3 | Status: SHIPPED | OUTPATIENT
Start: 2024-08-09

## 2024-08-09 RX ORDER — TEMAZEPAM 30 MG/1
30 CAPSULE ORAL NIGHTLY PRN
Qty: 30 CAPSULE | Refills: 0 | Status: SHIPPED | OUTPATIENT
Start: 2024-08-09

## 2024-08-13 NOTE — TELEPHONE ENCOUNTER
Nurse Call re: discharge from rehab on 08/11/2024 s/p hospital admission for stroke. Pt requesting ENT referral for evaluation of sinuses and throat previously saw Guicho Godinez MD in 2023. F/U visit with Dr. Wick 08/19/2024.

## 2024-08-14 ENCOUNTER — TELEPHONE (OUTPATIENT)
Dept: TRANSPLANT | Facility: CLINIC | Age: 69
End: 2024-08-14
Payer: MEDICARE

## 2024-08-14 NOTE — TELEPHONE ENCOUNTER
Pt called stated that she was D/C'd from her Rehab and will do outpt rehab, they will set up transportation for her.

## 2024-08-15 ENCOUNTER — TELEPHONE (OUTPATIENT)
Dept: PRIMARY CARE CLINIC | Facility: CLINIC | Age: 69
End: 2024-08-15
Payer: MEDICARE

## 2024-08-15 DIAGNOSIS — D84.9 IMMUNOCOMPROMISED PATIENT: Primary | ICD-10-CM

## 2024-08-15 NOTE — TELEPHONE ENCOUNTER
Responded to SERENA Collado NP in person re: reason for ENT referral request. Pt states, she wants to see an ENT for throat and sinuses.

## 2024-08-19 ENCOUNTER — OFFICE VISIT (OUTPATIENT)
Dept: PRIMARY CARE CLINIC | Facility: CLINIC | Age: 69
End: 2024-08-19
Payer: MEDICARE

## 2024-08-19 ENCOUNTER — PATIENT MESSAGE (OUTPATIENT)
Dept: OTOLARYNGOLOGY | Facility: CLINIC | Age: 69
End: 2024-08-19
Payer: MEDICARE

## 2024-08-19 ENCOUNTER — TELEPHONE (OUTPATIENT)
Dept: NEUROSURGERY | Facility: CLINIC | Age: 69
End: 2024-08-19
Payer: MEDICARE

## 2024-08-19 VITALS
HEART RATE: 88 BPM | DIASTOLIC BLOOD PRESSURE: 78 MMHG | WEIGHT: 163 LBS | OXYGEN SATURATION: 97 % | BODY MASS INDEX: 29.81 KG/M2 | SYSTOLIC BLOOD PRESSURE: 150 MMHG | TEMPERATURE: 97 F

## 2024-08-19 DIAGNOSIS — R47.01 APHASIA: ICD-10-CM

## 2024-08-19 DIAGNOSIS — N39.41 URGE INCONTINENCE OF URINE: ICD-10-CM

## 2024-08-19 DIAGNOSIS — M48.07 SPINAL STENOSIS OF LUMBOSACRAL REGION: Primary | ICD-10-CM

## 2024-08-19 DIAGNOSIS — F51.01 PRIMARY INSOMNIA: ICD-10-CM

## 2024-08-19 DIAGNOSIS — N39.3 SUI (STRESS URINARY INCONTINENCE, FEMALE): Chronic | ICD-10-CM

## 2024-08-19 PROCEDURE — 3066F NEPHROPATHY DOC TX: CPT | Mod: HCNC,CPTII,S$GLB, | Performed by: INTERNAL MEDICINE

## 2024-08-19 PROCEDURE — 3008F BODY MASS INDEX DOCD: CPT | Mod: HCNC,CPTII,S$GLB, | Performed by: INTERNAL MEDICINE

## 2024-08-19 PROCEDURE — 1157F ADVNC CARE PLAN IN RCRD: CPT | Mod: HCNC,CPTII,S$GLB, | Performed by: INTERNAL MEDICINE

## 2024-08-19 PROCEDURE — 3044F HG A1C LEVEL LT 7.0%: CPT | Mod: HCNC,CPTII,S$GLB, | Performed by: INTERNAL MEDICINE

## 2024-08-19 PROCEDURE — 3288F FALL RISK ASSESSMENT DOCD: CPT | Mod: HCNC,CPTII,S$GLB, | Performed by: INTERNAL MEDICINE

## 2024-08-19 PROCEDURE — 3077F SYST BP >= 140 MM HG: CPT | Mod: HCNC,CPTII,S$GLB, | Performed by: INTERNAL MEDICINE

## 2024-08-19 PROCEDURE — 99417 PROLNG OP E/M EACH 15 MIN: CPT | Mod: HCNC,S$GLB,, | Performed by: INTERNAL MEDICINE

## 2024-08-19 PROCEDURE — 99999 PR PBB SHADOW E&M-EST. PATIENT-LVL IV: CPT | Mod: PBBFAC,HCNC,, | Performed by: INTERNAL MEDICINE

## 2024-08-19 PROCEDURE — 1101F PT FALLS ASSESS-DOCD LE1/YR: CPT | Mod: HCNC,CPTII,S$GLB, | Performed by: INTERNAL MEDICINE

## 2024-08-19 PROCEDURE — 3078F DIAST BP <80 MM HG: CPT | Mod: HCNC,CPTII,S$GLB, | Performed by: INTERNAL MEDICINE

## 2024-08-19 PROCEDURE — 99215 OFFICE O/P EST HI 40 MIN: CPT | Mod: HCNC,S$GLB,, | Performed by: INTERNAL MEDICINE

## 2024-08-19 PROCEDURE — 1111F DSCHRG MED/CURRENT MED MERGE: CPT | Mod: HCNC,CPTII,S$GLB, | Performed by: INTERNAL MEDICINE

## 2024-08-19 RX ORDER — TIZANIDINE 4 MG/1
4 TABLET ORAL EVERY 6 HOURS PRN
Qty: 120 TABLET | Refills: 2 | Status: SHIPPED | OUTPATIENT
Start: 2024-08-19 | End: 2024-08-27

## 2024-08-19 RX ORDER — ZOLPIDEM TARTRATE 5 MG/1
5 TABLET ORAL NIGHTLY PRN
Qty: 30 TABLET | Refills: 2 | Status: SHIPPED | OUTPATIENT
Start: 2024-08-19 | End: 2024-08-19

## 2024-08-19 RX ORDER — SCOLOPAMINE TRANSDERMAL SYSTEM 1 MG/1
1 PATCH, EXTENDED RELEASE TRANSDERMAL
Qty: 10 PATCH | Refills: 0
Start: 2024-08-19 | End: 2024-09-18

## 2024-08-19 RX ORDER — ACETAMINOPHEN 500 MG
5000 TABLET ORAL DAILY
COMMUNITY
End: 2024-08-27

## 2024-08-19 RX ORDER — ATORVASTATIN CALCIUM 40 MG/1
40 TABLET, FILM COATED ORAL DAILY
Qty: 30 TABLET | Refills: 2
Start: 2024-08-19 | End: 2024-08-27

## 2024-08-19 NOTE — TELEPHONE ENCOUNTER
----- Message from Deysi Walker sent at 8/19/2024 12:40 PM CDT -----  Contact: Patient, [phone]  Calling to speak with the nurse regarding she needs approval for transportation for tomorrow. Please call [employer], [phone]. Thanks.

## 2024-08-19 NOTE — TELEPHONE ENCOUNTER
Patient assisted with getting transportation scheduled. Patient notified of Ref # Patient verbalized understanding.

## 2024-08-19 NOTE — TELEPHONE ENCOUNTER
Call to pt to facilitate scheduling ENT appt and to assess general status. Pt states that any date/time is fine, will need to know appt infor 72 hours ahead to schedule ride via insurer.     Call to , appt scheduled for 08/23/24 at 1345. Call to pt to share date/[facility].

## 2024-08-19 NOTE — PATIENT INSTRUCTIONS
If you are feeling unwell, we'd like to be the first ones to know here at [facility]! Please give us a call. Same day appointments are our top priority to keep you well and out of the emergency rooms and hospitals. Call [phone] for our direct line. After hours advice is always available. Please call [phone] after hours to speak to the on-call team.

## 2024-08-19 NOTE — PROGRESS NOTES
Nadia Damon  08/19/2024  [id number]    Elis Wick MD  Patient Care Team:  Elis Wick MD as PCP - General (Internal Medicine)  Miguel Soni Jr., MD as Consulting Physician (Vascular Surgery)  Ike King MD as Consulting Physician (Cardiology)  Courtney Tubbs MD as Consulting Physician (Cardiology)  Carter Crawford MD as Consulting Physician (Nephrology)  Paxton Vasques OD as Consulting Physician (Optometry)  PRANAY Villalobos MD as Obstetrician (Obstetrics)  Parker Mccarthy IV, MD (Urology)  Ike King MD as Consulting Physician (Cardiology)  Jose Roland MD as Consulting Physician (Dermatology)  Prasanth Johnson MD as Consulting Physician (Rheumatology)  Elis Wick MD as Physician (Internal Medicine)  Lexi Bianchi LCSW as  (Hematology and Oncology)  Candace Saleh LMSW as  (Hematology and Oncology)  Kelsie Burk PA-C as Physician Assistant (Hematology and Oncology)  Chelsea Monte as ED Navigator    Visit Type: Hospital/Rehab discharge follow-up    Chief Complaint:  Chief Complaint   Patient presents with    Follow-up     Pt was in rehab, recent CVA     History of Present Illness: Ms. Damon is a 69 year old female here for scheduled f/u.    Ms. Damon suffered CVA 7/19/24 with [facility] on-going dysarthria and R-sided weakness. She also has severe spinal stenosis.    Got home 8/11 - AC broken - does have a couple of portable units   Has not been drinking enough water     Prior to CVA: Interstim placed R hip 6/11/24 w Urology Dr. Cochran - fell against area 3 days post-op w continued pain at the site.      Ms. Damon w h/o heart transplant 1993, on anti-rejection medication.   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 She was treated with 1 cycle of  systemic chemotherapy cytoxan and taxotere and udenyca that was discontinued due to toxicity. She has been followed by radiation oncology and completed last radiation treatment 5/14/22. She is still followed by Heme/Onc for Epo, initiated for anemia due to stage 4 CKD.       Other medical issues include depresssion/anxiety/insomnia, hypertension, chronic diastolic CHF, and osteoporois for which she is receiving Prolia.      PHQ-4 Score: 0     Hosp/ED/Urgent Care:  7/29-8/11/24 [facility]   7/19-7/29/24 Hosp CVA    Upcoming appointments:   Date Provider Specialty Appt Notes    8/20/2024 Belkis Chen PA-C Neurosurgery F/U after CT Myelogram L-Spine; 07/23 8/23/2024 Guicho Godinez MD Otolaryngology Sinus/ Throat    9/27/2024 Andrea Hernandez MD Cardiology dicharged from  rehab on 08/11    10/9/2024 Christoph Douglas MD Surgery 3 month f/u    10/17/2024 Kristine Delgado NP Breast Surgery exam only    10/23/2024  [facility]    10/29/2024 Carter Crawford MD Nephrology 5m/lab/sf    1/16/2025  Lab Dr. Johnson    1/17/2025 Sami Cochran MD Urology 6 month f/u    1/23/2025 Prasanth Johnson MD Rheumatology rtc 6 months   The following were reviewed: Active problem list, medication list, allergies, family history, social history, and Health Maintenance.     Medications have been reviewed and reconciled with patient at visit today.    Review of Systems   See HPI above    Exam:  Vitals:    08/19/24 1529   BP: (!) 150/78   Pulse: 88   Temp: 97 °F (36.1 °C)     Weight: 73.9 kg (163 lb)   Body mass index is 29.81 kg/m².     BP Readings from Last 3 Encounters:   08/19/24 (!) 150/78   07/29/24 107/64   07/12/24 (!) 160/82      Wt Readings from Last 3 Encounters:   08/19/24 1529 73.9 kg (163 lb)   07/24/24 1945 74 kg (163 lb 2.3 oz)   07/21/24 1934 72.9 kg (160 lb 11.5 oz)     Physical Exam  Vitals reviewed.   Constitutional:       Appearance: Normal appearance.      Comments: Uncomfortable, appears to be in pain    HENT:      Head: Normocephalic and atraumatic.      Right Ear: External ear normal.      Left Ear: External ear normal.      Nose: Nose normal.      Mouth/Throat:      Mouth: Mucous membranes are moist.      Pharynx: Oropharynx is clear.   Eyes:      Extraocular Movements: Extraocular movements intact.      Conjunctiva/sclera: Conjunctivae normal.      Comments: glasses   Cardiovascular:      Rate and Rhythm: Normal rate.   Pulmonary:      Effort: Pulmonary effort is normal. No respiratory distress.   Musculoskeletal:      Right lower leg: No edema.      Left lower leg: No edema.   Skin:     General: Skin is warm and dry.   Neurological:      Mental Status: She is alert and oriented to person, place, and time. Mental status is at baseline.      Motor: Weakness present.      Gait: Gait abnormal.      Comments: Dysarthria  R-sided weakness   Psychiatric:         Behavior: Behavior normal.        Laboratory Reviewed  Lab Results   Component Value Date    WBC 4.03 07/28/2024    HGB 9.5 (L) 07/28/2024    HCT 29.9 (L) 07/28/2024     07/28/2024    MCV 89 07/28/2024    CHOL 142 07/20/2024    TRIG 85 07/20/2024    HDL 44 07/20/2024    LDLCALC 81.0 07/20/2024    ALT 21 07/28/2024    AST 40 07/28/2024     (L) 07/28/2024    K 5.0 07/28/2024     07/28/2024    CREATININE 3.0 (H) 07/28/2024    BUN 46 (H) 07/28/2024    CO2 17 (L) 07/28/2024    MG 1.7 07/28/2024    TSH 5.822 (H) 07/19/2024    FREET4 0.94 07/19/2024    PSA <0.1 05/27/2008    INR 1.0 07/20/2024    HGBA1C 5.1 07/02/2024    CRP 14.4 (H) 08/24/2023     Lab Results   Component Value Date    .2 (H) 05/20/2024    CALCIUM 8.7 07/28/2024    CAION 1.13 09/05/2018    PHOS 4.5 07/20/2024      Lab Results   Component Value Date    TIYBJLIK12 1373 (H) 06/20/2023     Lab Results   Component Value Date    FOLATE 20.0 06/20/2023      Lab Results   Component Value Date    IRON 67 04/05/2024    TRANSFERRIN 187 (L) 04/05/2024    TIBC 277 04/05/2024     FESATURATED 24 04/05/2024      Lab Results   Component Value Date    EGFRNORACEVR 16 (A) 07/28/2024    ALBUMIN 3.7 07/28/2024     (H) 07/19/2024     Lab Results   Component Value Date    RQDVTXAK31ZB 39 07/02/2024       Assessment:   69 y.o. female with multiple co-morbid illnesses here for continued follow up of medical problems.      The primary encounter diagnosis was Spinal stenosis of lumbosacral region. Diagnoses of Primary insomnia, Aphasia, Urge incontinence of urine, and FRANKY (stress urinary incontinence, female) were also pertinent to this visit.      Plan:   1. Spinal stenosis of lumbosacral region  Assessment & Plan:  W sig pain and affecting function  F/u NS as scheduled      2. Primary insomnia  -     Discontinue: zolpidem (AMBIEN) 5 MG Tab; Take 1 tablet (5 mg total) by mouth nightly as needed (insomnia).  Dispense: 30 tablet; Refill: 2    3. Aphasia  Assessment & Plan:  S/p recent CVA - improving      4. Urge incontinence of urine  Assessment & Plan:  S/P recent placement of bladder neurostimulator - f/u urology      5. FRANKY (stress urinary incontinence, female)  Overview:  08/10/2023 robotic [place] urethropexy     Assessment & Plan:  S/P recent placement of bladder neurostimulator - f/u urology      Other orders  -     Discontinue: tiZANidine (ZANAFLEX) 4 MG tablet; Take 1 tablet (4 mg total) by mouth every 6 (six) hours as needed (pain).  Dispense: 120 tablet; Refill: 2  -     Discontinue: atorvastatin (LIPITOR) 40 MG tablet; Take 1 tablet (40 mg total) by mouth once daily.  Dispense: 30 tablet; Refill: 2         Health Maintenance         Date Due Completion Date    RSV Vaccine (Age 60+ and Pregnant patients) (1 - 1-dose 60+ series) Never done ---    Influenza Vaccine (1) 09/01/2024 11/2/2023    COVID-19 Vaccine (7 - 2023-24 season) 09/01/2024 4/27/2023    Mammogram 04/18/2025 4/18/2024    Lipid Panel 07/20/2025 7/20/2024    Colorectal Cancer Screening 02/25/2026 2/25/2021    Hemoglobin A1c  (Diabetic Prevention Screening) 07/02/2027 7/2/2024    DEXA Scan 07/03/2027 7/3/2024    TETANUS VACCINE 09/09/2030 9/9/2020            -Patient's lab results were reviewed and discussed with patient  -Treatment options and alternatives were discussed with the patient. Patient expressed understanding. Patient was given the opportunity to ask questions and be an active participant in their medical care. Patient had no further questions or concerns at this time.   -Patient is an overall HIGH risk for health complications from their medical conditions.     Follow up: Follow up in about 2 weeks (around 9/2/2024) for Follow Up.    After visit summary printed and given to patient upon discharge.  Patient care plan included in After visit summary.    TOTAL TIME evaluating and managing this patient for this encounter was greater than 80 minutes. This time was spent personally by me on some of the following activities: review of patient's past medical history, assessing age-appropriate health maintenance needs, review of any interval history, review and interpretation of lab results, review and interpretation of imaging test results, review and interpretation of cardiology test results, reviewing consulting specialist notes, obtaining history from the patient and family, examination of the patient, medication reconciliation, managing and/or ordering prescription medications, ordering imaging tests, ordering referral to subspecialty provider(s), educating patient and answering their questions about diagnosis, treatment plan, and goals of treatment, discussing planned follow-up and final documentation of the visit. This time was exclusive of any separately billable procedures for this patient and exclusive of time spent treating any other patients.

## 2024-08-19 NOTE — TELEPHONE ENCOUNTER
----- Message from Pop Bradford sent at 8/19/2024  9:52 AM CDT -----  Contact: Nadia  Nadia is requesting that the nurse call jose transportation and verify with them that she does have an appointment tomorrow, so that they will give her a ride to the appointment. Please give jose a call at [phone]. She also asked if you could give her a call back at [phone]

## 2024-08-20 ENCOUNTER — OFFICE VISIT (OUTPATIENT)
Dept: NEUROSURGERY | Facility: CLINIC | Age: 69
End: 2024-08-20
Payer: MEDICARE

## 2024-08-20 ENCOUNTER — PATIENT MESSAGE (OUTPATIENT)
Dept: PRIMARY CARE CLINIC | Facility: CLINIC | Age: 69
End: 2024-08-20
Payer: MEDICARE

## 2024-08-20 VITALS
DIASTOLIC BLOOD PRESSURE: 71 MMHG | WEIGHT: 146.5 LBS | SYSTOLIC BLOOD PRESSURE: 118 MMHG | HEART RATE: 92 BPM | BODY MASS INDEX: 26.79 KG/M2

## 2024-08-20 DIAGNOSIS — M54.6 PAIN IN THORACIC SPINE: ICD-10-CM

## 2024-08-20 DIAGNOSIS — M54.14 THORACIC RADICULOPATHY: Primary | ICD-10-CM

## 2024-08-20 PROCEDURE — 3078F DIAST BP <80 MM HG: CPT | Mod: CPTII,S$GLB,, | Performed by: PHYSICIAN ASSISTANT

## 2024-08-20 PROCEDURE — 1111F DSCHRG MED/CURRENT MED MERGE: CPT | Mod: CPTII,S$GLB,, | Performed by: PHYSICIAN ASSISTANT

## 2024-08-20 PROCEDURE — 99999 PR PBB SHADOW E&M-EST. PATIENT-LVL III: CPT | Mod: PBBFAC,,, | Performed by: PHYSICIAN ASSISTANT

## 2024-08-20 PROCEDURE — 99214 OFFICE O/P EST MOD 30 MIN: CPT | Mod: S$GLB,,, | Performed by: PHYSICIAN ASSISTANT

## 2024-08-20 PROCEDURE — 3066F NEPHROPATHY DOC TX: CPT | Mod: CPTII,S$GLB,, | Performed by: PHYSICIAN ASSISTANT

## 2024-08-20 PROCEDURE — 3074F SYST BP LT 130 MM HG: CPT | Mod: CPTII,S$GLB,, | Performed by: PHYSICIAN ASSISTANT

## 2024-08-20 PROCEDURE — 1101F PT FALLS ASSESS-DOCD LE1/YR: CPT | Mod: CPTII,S$GLB,, | Performed by: PHYSICIAN ASSISTANT

## 2024-08-20 PROCEDURE — 3288F FALL RISK ASSESSMENT DOCD: CPT | Mod: CPTII,S$GLB,, | Performed by: PHYSICIAN ASSISTANT

## 2024-08-20 PROCEDURE — 3044F HG A1C LEVEL LT 7.0%: CPT | Mod: CPTII,S$GLB,, | Performed by: PHYSICIAN ASSISTANT

## 2024-08-20 PROCEDURE — 3008F BODY MASS INDEX DOCD: CPT | Mod: CPTII,S$GLB,, | Performed by: PHYSICIAN ASSISTANT

## 2024-08-20 PROCEDURE — 1159F MED LIST DOCD IN RCRD: CPT | Mod: CPTII,S$GLB,, | Performed by: PHYSICIAN ASSISTANT

## 2024-08-20 PROCEDURE — 1125F AMNT PAIN NOTED PAIN PRSNT: CPT | Mod: CPTII,S$GLB,, | Performed by: PHYSICIAN ASSISTANT

## 2024-08-20 PROCEDURE — 1157F ADVNC CARE PLAN IN RCRD: CPT | Mod: CPTII,S$GLB,, | Performed by: PHYSICIAN ASSISTANT

## 2024-08-20 RX ORDER — ZOLPIDEM TARTRATE 5 MG/1
5 TABLET ORAL NIGHTLY PRN
Qty: 30 TABLET | Refills: 2 | Status: SHIPPED | OUTPATIENT
Start: 2024-08-20 | End: 2024-08-27

## 2024-08-20 NOTE — PROGRESS NOTES
"  Patient known to NSGY. She recently admitted to ED for aphasia and weakness and was found to have had CVA, MRI not possible due to pacemaker.     Patient now back home from  rehab:      Did not receive the injections or steroid pack.     Currently self medicating with some norco, tramadol, and muscle relaxers all at once      Back pain persists and is "horrible" 10/10. RIGHT SIDED pain most severe.       She was brought to clinic today via transportation. Living in senior living facililty. She has expressive aphasia, but decipherable speech. Stroke affecting her right side as well.       Back more severe than leg pain. She has some patches there.     Patient states she cannot sleep in the bed. She sleeps upright in her recliner.        Patient is 67 yo F who has complex medical hx, including heart transplant, CKD. HTN. HLD, atherosclerosis, She recently had a bladder stimulator procedure performed.  who presented to ED a few days ago due to worsening back pain, weakness, and aphasia. NSGY consulted to evaluate known disc protrusion / osteophyte complex in thoracic spine due to severe debility.  And difficulty working with PT OT.  She was seen in neurosurgery clinic a few weeks ago for evaluation of lower extremity weakness and severe right-sided back pain progressively worsening, an MRI was ordered at the time however patient unable to complete due to implanted devices which are not MR compatible.  Neurosurgery recommended CT myelogram to further evaluate neural elements.  Once again we are able to visualize a thoracic disc protrusion causing right lateral recess stenosis and moderate central stenosis at T11-12.     Patient was seen and evaluated this morning with Dr. Canales.        Pain is worse on the right sided. Goes down posterior buttock into the right hip and down to the toes.  She endorses numbness in her distal extremity.      She endorses difficulty ambulating due to pain and weakness in her legs.      "          Pertinent positive and negative ROS documented above in HPI, all other systems reviewed and found to be negative.               Constitutional/ General: Awake, alert, grossly oriented Sitting upright in No acute distress.  Aphasia noted. Well developed/well nourished     Neck: trachea midline. ROM intact     Respiratory: No increased work of breathing on room air. Chest expansion equal and symmetric.     Neuro: AAO X3 speech is fluent and appripriate CN II-XII grossly intact throughout. EOMI. Face symmetric tongue midline. Shoulder shrug equal and intact BL. Follows commands and moves all extremities antigravity. Rigth sdied weakness noted     Extremities:No cyanosis clubbing or edema.  Difficulty getting around        Narrative & Impression  EXAMINATION:  CT MYELOGRAPHY LUMBAR SPINE     CLINICAL HISTORY:  Spinal stenosis, lumbosacral regionLower back pain with weakness;     TECHNIQUE:  Standard myelogram CT scan of the lumbar spine.     COMPARISON:  None     FINDINGS:  There is adequate intrathecal contrast.     The distal cord and conus appear normal with the conus at L1.     T10-11: Moderate disc degeneration with disc narrowing and vacuum disc phenomenon.  Mild disc bulge.     T11-12: Severe degenerative disc disease with high-grade disc narrowing, vacuum disc phenomenon and endplate sclerosis.  Moderate disc bulge with ventral sac impression.  Mild central canal stenosis with moderate to severe right and moderate left foraminal stenosis.     T12-L1: Mild disc degeneration with mild disc bulge.     L1-2: Mild disc bulge.     L2-3: Mild disc bulge and minor facet arthrosis.     L3-4: Mild disc degeneration with mild disc bulge.  Mild facet arthrosis.     L4-5: Mild disc degeneration with moderate disc bulge.  Moderate facet arthrosis with dorsal sac impingement.  Mild central canal stenosis.     L5-S1: Mild disc bulge.  Mild facet arthrosis.     Impression:     L4-5 disc bulge with advanced facet  arthrosis resulting mild central and foraminal stenosis.     Advanced T11-12 disc degeneration with mild central canal stenosis.  Moderate to severe right and moderate left foraminal stenosis.     All CT scans at this facility are performed  using dose modulation techniques as appropriate to performed exam including the following:  automated exposure control; adjustment of mA and/or kV according to the patients size (this includes techniques or standardized protocols for targeted exams where dose is matched to indication/reason for exam: i.e. extremities or head);  iterative reconstruction technique.        Electronically signed by:Gunner Rao MD  Date:                                            07/23/2024  Time:                                           14:43           A/P:  Patient is a 68-year-old female with complex medical history as above who presented to ED recently for acute onset aphasia headache and acutely worsening back pain.  Neurosurgery consulted due to rapidly declining functional status and inability to work with therapies.  Patient unable to undergo MRIs or CT myelogram was performed.  Dr. Brice saleh and I reviewed this imaging study agree with results above.  There is a disc herniation at T11-T12 causing moderate central spinal stenosis and severe right-sided foraminal stenosis.  Agree with continued rehab at this time.  Will see patient back after HELLEN   .  No Acute neurosurgical intervention is indicated at this time.  Continue with pain control and therapy.  Continue medical management       refer for RIGHT T11-T12 TFESI with pain management  Attestation:  Belkis PORTER PA-C have obtained HPI, performed Physical Examination on the above patient, reviewed the pertinent labs, tests, imaging, other relevant data and recorded my findings in this clinic note.      POST-OP DIAGNOSIS:  Congenital dislocation of hip 2023 11:31:00 LEFT Charline Madden

## 2024-08-21 DIAGNOSIS — M54.16 LUMBAR RADICULOPATHY: Primary | ICD-10-CM

## 2024-08-23 ENCOUNTER — OFFICE VISIT (OUTPATIENT)
Dept: OTOLARYNGOLOGY | Facility: CLINIC | Age: 69
End: 2024-08-23
Payer: MEDICARE

## 2024-08-23 VITALS — BODY MASS INDEX: 26.94 KG/M2 | HEIGHT: 62 IN | WEIGHT: 146.38 LBS

## 2024-08-23 DIAGNOSIS — J38.00 VOCAL CORD PARESIS: ICD-10-CM

## 2024-08-23 DIAGNOSIS — I63.9 CEREBROVASCULAR ACCIDENT (CVA), UNSPECIFIED MECHANISM: ICD-10-CM

## 2024-08-23 DIAGNOSIS — R13.12 OROPHARYNGEAL DYSPHAGIA: Primary | ICD-10-CM

## 2024-08-23 PROCEDURE — 99999 PR PBB SHADOW E&M-EST. PATIENT-LVL IV: CPT | Mod: PBBFAC,,, | Performed by: STUDENT IN AN ORGANIZED HEALTH CARE EDUCATION/TRAINING PROGRAM

## 2024-08-23 NOTE — PROGRESS NOTES
Chief complaint:    Chief Complaint   Patient presents with    Hoarse     Pt has hoarseness since her recent stroke  7/19/2024            Referring Provider:  Self, Aaareferral  No address on file      History of present illness:     Ms. Damon is a 69 y.o. presenting for evaluation of dysphonia since stroke on July 19    Right sided hemiparesis after the stroke  Associated dysphonia and dysphagia started after the stroke.   The voice has improved, but not back to baseline. Has issues with both dysarthria and voice giving out  and weak and raspy  She is doing voice therapy       History      Past Medical History:   Past Medical History:   Diagnosis Date    Abdominal wall hernia     CT Renal 6/11/2018---Small fat containing superior ventral abdominal wall hernia at the epicardial pacing lead site.    Abnormal mammogram 10/12/2021    Acute cystitis without hematuria 05/10/2022    GRACE (acute kidney injury) 11/22/2021    Anxiety     Arthritis     ZEN HIPS    Breast cancer in female 08/2021    LEFT BREAST    Bronchitis 08/18/2016    Never smoked      C. difficile colitis 11/29/2021    Cellulitis of axilla, left 12/23/2021    Chronic diastolic heart failure 12/16/2021    Chronic kidney disease     stage 4, GFR 15-29 ml/min    Chronic midline low back pain without sciatica 06/18/2018    Closed nondisplaced fracture of distal phalanx of left great toe with routine healing 10/22/2018    Coronary artery disease 1993    heart transplant    COVID-19 in immunocompromised patient 02/26/2024    Cystitis 05/10/2022    Depression     Encounter for blood transfusion     Fibromyalgia     on Lyrica    Heart failure     native heart cardiomyopathy    Heart transplanted 1993    due to cardiomyopathy    History of hyperparathyroidism; Hyperparathyroidism, secondary renal     PT DENIES    Hypertension     Immune disorder     anti rejection meds    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    Impaired mobility 07/28/2022     Iron deficiency anemia 08/15/2017    Kidney stones     passed per pt    Obesity     Obesity (BMI 30.0-34.9) 07/22/2019    Other osteoporosis without current pathological fracture 08/30/2019    Other pancytopenia 05/06/2024    Parotitis, acute 09/19/2023    Pharyngitis 01/09/2019    Pneumonia due to infectious organism 03/14/2024    PONV (postoperative nausea and vomiting)     Severe sepsis 11/22/2021    Shingles 2003 approx    left leg    Subclinical hypothyroidism 06/16/2023    Thrombocytopenia, unspecified 11/29/2021    Trouble in sleeping     Urinary incontinence          Past Surgical History:  Past Surgical History:   Procedure Laterality Date    bladder implant Right 06/11/2024    BLADDER SURGERY  2015 approx    mesh - Dr Everett then 2nd reconstructive sx Dr Onofre    BREAST BIOPSY Bilateral     NEGATIVE    BREAST BIOPSY Right 10/31/2022    benign    BREAST LUMPECTOMY Left 2021    BREAST SURGERY Left 09/28/2015    Bx - benign    BREAST SURGERY Right 12/2015    Bx benign    CARDIAC PACEMAKER REMOVAL Left 06/26/2014    Pacer defirillator removed. Put in 1993 aat time of heart transplant    CARPAL TUNNEL RELEASE Left 03/03/2015    Dr. Hall    COLONOSCOPY N/A 02/25/2021    Procedure: COLONOSCOPY;  Surgeon: Freida Ramirez MD;  Location: [facility] ENDO;  Service: Endoscopy;  Laterality: N/A;    CYSTOCELE REPAIR      Twice with mesh removal    EPIDURAL STEROID INJECTION INTO CERVICAL SPINE N/A 02/02/2023    Procedure: T11/T12 IL HELLEN;  Surgeon: Jassi Pierre MD;  Location: [facility];  Service: Pain Management;  Laterality: N/A;    HEART TRANSPLANT  1993    HERNIA REPAIR Right 1971 approx    Inguinal    HYSTERECTOMY  1983    vag hyst /LSO     IMPLANTATION OF PERMANENT SACRAL NERVE STIMULATOR N/A 06/11/2024    Procedure: INSERTION, NEUROSTIMULATOR, PERMANENT, SACRAL;  Surgeon: Sami Cochran MD;  Location: [facility] OR;  Service: Urology;  Laterality: N/A;    INCISION AND DRAINAGE OF ABSCESS Left  12/24/2021    Procedure: INCISION AND DRAINAGE, ABSCESS;  Surgeon: Joseph Longo MD;  Location: [facility] OR;  Service: General;  Laterality: Left;    INJECTION OF ANESTHETIC AGENT AROUND MEDIAL BRANCH NERVES INNERVATING LUMBAR FACET JOINT Right 10/19/2022    Procedure: Right L4/L5 and L5/S1 MBB;  Surgeon: Jassi Pierre MD;  Location: [facility] PAIN MGT;  Service: Pain Management;  Laterality: Right;    INJECTION OF ANESTHETIC AGENT AROUND MEDIAL BRANCH NERVES INNERVATING LUMBAR FACET JOINT Right 11/09/2022    Procedure: Right L4/L5 and L5/S1 MBB;  Surgeon: Jassi Pierre MD;  [facility];  Service: Pain Management;  Laterality: Right;    INJECTION OF ANESTHETIC AGENT INTO SACROILIAC JOINT Right 08/22/2022    Procedure: Right SIJ Injection Right L5/S1 Facte Injection;  Surgeon: Jassi Pierre MD;  Location: [facility] PAIN [facility]GT;  Service: Pain Management;  Laterality: Right;    INSERTION OF TUNNELED CENTRAL VENOUS CATHETER (CVC) WITH SUBCUTANEOUS PORT N/A 11/09/2021    Procedure: [id number];  Surgeon: Christoph Douglas MD;  Location: [facility] OR;  Service: General;  Laterality: N/A;    RADIOFREQUENCY THERMOCOAGULATION Right 12/07/2022    Procedure: Right L4/L5 and L5/S1 Lumbar RFA;  Surgeon: Jassi Pierre MD;  Location: [facility] PAIN [facility]GT;  Service: Pain Management;  Laterality: Right;    REMOVAL OF VASCULAR ACCESS PORT      ROBOT-ASSISTED LAPAROSCOPIC ABDOMINAL SACROCOLPOPEXY N/A 08/10/2023    Procedure: ROBOTIC SACROCOLPOPEXY, ABDOMEN;  Surgeon: PRANAY Villalobos MD;  Location: [facility] OR;  Service: OB/GYN;  Laterality: N/A;    ROBOT-ASSISTED LAPAROSCOPIC OOPHORECTOMY Right 08/10/2023    Procedure: ROBOTIC OOPHORECTOMY;  Surgeon: PRANAY Villalobos MD;  Location: [facility] OR;  Service: OB/GYN;  Laterality: Right;    SENTINEL LYMPH NODE BIOPSY Left 10/12/2021    Procedure: BIOPSY, LYMPH NODE, SENTINEL;  Surgeon: Christoph Douglas MD;  Location: [facility] OR;  Service: General;  Laterality: Left;    TOE SURGERY       "XI ROBOTIC URETHROPEXY N/A 08/10/2023    Procedure: XI ROBOTIC URETHROPEXY;  Surgeon: PRANAY Villalobos MD;  Location: [facility];  Service: OB/GYN;  Laterality: N/A;         Medications: Medication list reviewed. She  has a current medication list which includes the following prescription(s): aspirin, atorvastatin, carvedilol, cholecalciferol (vitamin d3), cyclosporine modified (neoral), evening primrose oil, fluticasone propionate, furosemide, lidocaine, methocarbamol, multivitamin, nifedipine, pantoprazole, patiromer calcium sorbitex, polyethylene glycol, pregabalin, retacrit, scopolamine, temazepam, tizanidine, UNABLE TO FIND, vitamin e, and zolpidem.     Allergies:   Review of patient's allergies indicates:   Allergen Reactions    Lisinopril Swelling and Rash    Augmentin [amoxicillin-pot clavulanate] Diarrhea    Zyvox [linezolid] Nausea And Vomiting         Family history: family history includes Breast cancer in her maternal grandmother and mother; Cancer (age of onset: 38) in her mother; Cataracts in her cousin; Heart disease in her maternal grandmother; Hypertension in her son.         Social History          Alcohol use:  reports no history of alcohol use.            Tobacco:  reports that she has never smoked. She has never been exposed to tobacco smoke. She has never used smokeless tobacco.         Physical Examination      Vitals: Height 5' 2" (1.575 m), weight 66.4 kg (146 lb 6.2 oz), last menstrual period 06/20/1983.      General: Well developed, well nourished, well hydrated.     Voice: moderate dysphonia (weak, raspy) and associated dysarthria    Head/Face: Normocephalic, atraumatic. No scars or lesions. Facial musculature equal.     Eyes: No scleral icterus or conjunctival hemorrhage. EOMI. PERRLA.     Ears:     Right ear: No gross deformity. EAC is clear of debris and erythema. TM are intact with a pneumatized middle ear. No signs of retraction, fluid or infection.      Left ear: No gross " deformity. EAC is clear of debris and erythema. TM are intact with a pneumatized middle ear. No signs of retraction, fluid or infection.      Nose: No gross deformity or lesions. No purulent discharge. No significant NSD.      Mouth/Oropharynx: Lips without any lesions. No mucosal lesions within the oropharynx. No tonsillar exudate or lesions. Pharyngeal walls symmetrical. Uvula midline. Tongue midline without lesions.     Neck: Trachea midline. No masses. No thyromegaly or nodules palpated.     Lymphatic: No lymphadenopathy in the neck.     Extremities: No cyanosis. Warm and well-perfused.     Skin: No scars or lesions on face or neck.          Data reviewed      Review of records:      I reviewed records from the referring provider's office visits, including the history, workup, and/or treatment of this problem thus far.        Procedures:    Procedure -Transnasal fiberoptic laryngoscopy     Surgeon: Guicho Godinez M.D. .      Anesthesia: topical 0.05% oxymetazoline with 4% lidocaine      Complications: None.     Description of Procedure: With the patient in the sitting position, topical lidocaine and oxymetazoline was applied to the nose. The scope was passed through the nose. Examination was carried out of the nose, nasopharynx, oropharynx, hypopharynx, and larynx with findings as noted below. Scope was removed. The patient tolerated the procedure well.      Findings: No masses or lesions in the nose, nasopharynx, oropharynx, hypopharynx, or larynx. Vocal fold abduction and adduction is partially limited on the right, but she does compensate fairly well with mild glottic gap. No pooling of secretions in the piriform sinuses, penetration, or aspiration.         Assessment/Plan:    1. Oropharyngeal dysphagia    2. Vocal cord paresis    3. Cerebrovascular accident (CVA), unspecified mechanism        Partial movement of the R TVC, suspect it has been improving as her overall weakness have improved as well  We  discussed the possibility of continued spontaneous improvement over the next several months  She will continue VT for now and in 3 months if she has not had significant improvement in VC motion we may consider augmentation          Guicho Godinez MD  [facility]   [facility]  [address]  [place] [address]  P: [phone]  F: [phone]

## 2024-08-26 ENCOUNTER — PATIENT MESSAGE (OUTPATIENT)
Dept: PAIN MEDICINE | Facility: CLINIC | Age: 69
End: 2024-08-26
Payer: MEDICARE

## 2024-08-26 ENCOUNTER — TELEPHONE (OUTPATIENT)
Dept: PAIN MEDICINE | Facility: CLINIC | Age: 69
End: 2024-08-26
Payer: MEDICARE

## 2024-08-26 NOTE — TELEPHONE ENCOUNTER
----- Message from Yonatan Hutton MA sent at 8/26/2024  1:37 PM CDT -----  Contact: self     ----- Message -----  From: Tai Garrison  Sent: 8/26/2024   1:22 PM CDT  To: [facility] Staff    .Type:  Patient Returning Call    Who Called:Patient   Who Left Message for Patient:Ronald Sanchez MA  Does the patient know what this is regarding?   Would the patient rather a call back or a response via [facility]? Call   Best Call Back Number:.[phone]    Additional Information:

## 2024-08-26 NOTE — TELEPHONE ENCOUNTER
Called to schedule pt injection with Dr Pierre, no answer oleft left for pt to call office back to schedule injection.    .Ronald PAMLER

## 2024-08-26 NOTE — TELEPHONE ENCOUNTER
----- Message from Jassi Pierre MD sent at 8/21/2024  9:48 AM CDT -----  Pain Provider: Ignacio  Patient Name: Nadia Damon  MRN: [id number]  Case: CERVICAL INTERLAMINAR EPIDURAL STEROID INJECTION  Level: T11/T12  Anticoagulation: ASA, may continue      Patient may follow up with Neurosurgery 3 weeks after procedure

## 2024-08-27 ENCOUNTER — HOSPITAL ENCOUNTER (INPATIENT)
Facility: HOSPITAL | Age: 69
LOS: 4 days | Discharge: HOME | DRG: 683 | End: 2024-09-01
Attending: EMERGENCY MEDICINE | Admitting: INTERNAL MEDICINE
Payer: MEDICARE

## 2024-08-27 ENCOUNTER — TELEPHONE (OUTPATIENT)
Dept: OTOLARYNGOLOGY | Facility: CLINIC | Age: 69
End: 2024-08-27
Payer: MEDICARE

## 2024-08-27 ENCOUNTER — TELEPHONE (OUTPATIENT)
Dept: PRIMARY CARE CLINIC | Facility: CLINIC | Age: 69
End: 2024-08-27
Payer: MEDICARE

## 2024-08-27 DIAGNOSIS — N17.9 ACUTE KIDNEY INJURY SUPERIMPOSED ON CHRONIC KIDNEY DISEASE: Primary | ICD-10-CM

## 2024-08-27 DIAGNOSIS — M51.26 LUMBAR DISC HERNIATION: ICD-10-CM

## 2024-08-27 DIAGNOSIS — N18.4 ANEMIA ASSOCIATED WITH STAGE 4 CHRONIC RENAL FAILURE: Chronic | ICD-10-CM

## 2024-08-27 DIAGNOSIS — M48.05 SPINAL STENOSIS OF THORACOLUMBAR REGION: ICD-10-CM

## 2024-08-27 DIAGNOSIS — N18.9 ACUTE KIDNEY INJURY SUPERIMPOSED ON CHRONIC KIDNEY DISEASE: Primary | ICD-10-CM

## 2024-08-27 DIAGNOSIS — N18.4 STAGE 4 CHRONIC KIDNEY DISEASE: ICD-10-CM

## 2024-08-27 DIAGNOSIS — D63.1 ANEMIA ASSOCIATED WITH STAGE 4 CHRONIC RENAL FAILURE: Chronic | ICD-10-CM

## 2024-08-27 LAB
ALBUMIN SERPL BCP-MCNC: 3.5 G/DL (ref 3.5–5.2)
ALP SERPL-CCNC: 99 U/L (ref 55–135)
ALT SERPL W/O P-5'-P-CCNC: 22 U/L (ref 10–44)
ANION GAP SERPL CALC-SCNC: 13 MMOL/L (ref 8–16)
AST SERPL-CCNC: 32 U/L (ref 10–40)
BACTERIA #/AREA URNS HPF: NORMAL /HPF
BASOPHILS # BLD AUTO: 0.01 K/UL (ref 0–0.2)
BASOPHILS NFR BLD: 0.3 % (ref 0–1.9)
BILIRUB SERPL-MCNC: 0.5 MG/DL (ref 0.1–1)
BILIRUB UR QL STRIP: NEGATIVE
BUN SERPL-MCNC: 60 MG/DL (ref 8–23)
CALCIUM SERPL-MCNC: 9 MG/DL (ref 8.7–10.5)
CHLORIDE SERPL-SCNC: 109 MMOL/L (ref 95–110)
CLARITY UR: CLEAR
CO2 SERPL-SCNC: 20 MMOL/L (ref 23–29)
COLOR UR: YELLOW
CREAT SERPL-MCNC: 5.5 MG/DL (ref 0.5–1.4)
DIFFERENTIAL METHOD BLD: ABNORMAL
EOSINOPHIL # BLD AUTO: 0.1 K/UL (ref 0–0.5)
EOSINOPHIL NFR BLD: 1.4 % (ref 0–8)
ERYTHROCYTE [DISTWIDTH] IN BLOOD BY AUTOMATED COUNT: 16.6 % (ref 11.5–14.5)
EST. GFR  (NO RACE VARIABLE): 8 ML/MIN/1.73 M^2
GLUCOSE SERPL-MCNC: 73 MG/DL (ref 70–110)
GLUCOSE UR QL STRIP: NEGATIVE
HCT VFR BLD AUTO: 27.9 % (ref 37–48.5)
HCV AB SERPL QL IA: NEGATIVE
HEP C VIRUS HOLD SPECIMEN: NORMAL
HGB BLD-MCNC: 9 G/DL (ref 12–16)
HGB UR QL STRIP: NEGATIVE
HIV 1+2 AB+HIV1 P24 AG SERPL QL IA: NEGATIVE
HYALINE CASTS #/AREA URNS LPF: 0 /LPF
IMM GRANULOCYTES # BLD AUTO: 0.03 K/UL (ref 0–0.04)
IMM GRANULOCYTES NFR BLD AUTO: 0.9 % (ref 0–0.5)
KETONES UR QL STRIP: NEGATIVE
LEUKOCYTE ESTERASE UR QL STRIP: NEGATIVE
LYMPHOCYTES # BLD AUTO: 1.1 K/UL (ref 1–4.8)
LYMPHOCYTES NFR BLD: 31.3 % (ref 18–48)
MCH RBC QN AUTO: 28.9 PG (ref 27–31)
MCHC RBC AUTO-ENTMCNC: 32.3 G/DL (ref 32–36)
MCV RBC AUTO: 90 FL (ref 82–98)
MICROSCOPIC COMMENT: NORMAL
MONOCYTES # BLD AUTO: 0.5 K/UL (ref 0.3–1)
MONOCYTES NFR BLD: 13.6 % (ref 4–15)
NEUTROPHILS # BLD AUTO: 1.8 K/UL (ref 1.8–7.7)
NEUTROPHILS NFR BLD: 52.5 % (ref 38–73)
NITRITE UR QL STRIP: NEGATIVE
NRBC BLD-RTO: 0 /100 WBC
PH UR STRIP: 6 [PH] (ref 5–8)
PLATELET # BLD AUTO: 177 K/UL (ref 150–450)
PMV BLD AUTO: 9.6 FL (ref 9.2–12.9)
POTASSIUM SERPL-SCNC: 4 MMOL/L (ref 3.5–5.1)
PROT SERPL-MCNC: 7.7 G/DL (ref 6–8.4)
PROT UR QL STRIP: ABNORMAL
RBC # BLD AUTO: 3.11 M/UL (ref 4–5.4)
RBC #/AREA URNS HPF: 0 /HPF (ref 0–4)
SODIUM SERPL-SCNC: 142 MMOL/L (ref 136–145)
SP GR UR STRIP: 1.01 (ref 1–1.03)
SQUAMOUS #/AREA URNS HPF: 2 /HPF
URN SPEC COLLECT METH UR: ABNORMAL
UROBILINOGEN UR STRIP-ACNC: NEGATIVE EU/DL
WBC # BLD AUTO: 3.45 K/UL (ref 3.9–12.7)
WBC #/AREA URNS HPF: 2 /HPF (ref 0–5)

## 2024-08-27 PROCEDURE — 85025 COMPLETE CBC W/AUTO DIFF WBC: CPT | Mod: HCNC | Performed by: EMERGENCY MEDICINE

## 2024-08-27 PROCEDURE — 63600175 PHARM REV CODE 636 W HCPCS: Mod: HCNC | Performed by: INTERNAL MEDICINE

## 2024-08-27 PROCEDURE — G0378 HOSPITAL OBSERVATION PER HR: HCPCS | Mod: HCNC

## 2024-08-27 PROCEDURE — 86803 HEPATITIS C AB TEST: CPT | Mod: HCNC | Performed by: EMERGENCY MEDICINE

## 2024-08-27 PROCEDURE — 96372 THER/PROPH/DIAG INJ SC/IM: CPT | Performed by: NURSE PRACTITIONER

## 2024-08-27 PROCEDURE — 99285 EMERGENCY DEPT VISIT HI MDM: CPT | Mod: 25,HCNC

## 2024-08-27 PROCEDURE — 96374 THER/PROPH/DIAG INJ IV PUSH: CPT | Mod: HCNC

## 2024-08-27 PROCEDURE — 63600175 PHARM REV CODE 636 W HCPCS: Mod: HCNC | Performed by: NURSE PRACTITIONER

## 2024-08-27 PROCEDURE — 63600175 PHARM REV CODE 636 W HCPCS: Mod: HCNC | Performed by: EMERGENCY MEDICINE

## 2024-08-27 PROCEDURE — 81000 URINALYSIS NONAUTO W/SCOPE: CPT | Mod: HCNC | Performed by: EMERGENCY MEDICINE

## 2024-08-27 PROCEDURE — 87389 HIV-1 AG W/HIV-1&-2 AB AG IA: CPT | Mod: HCNC | Performed by: EMERGENCY MEDICINE

## 2024-08-27 PROCEDURE — 25000003 PHARM REV CODE 250: Mod: HCNC | Performed by: NURSE PRACTITIONER

## 2024-08-27 PROCEDURE — 96375 TX/PRO/DX INJ NEW DRUG ADDON: CPT | Mod: HCNC

## 2024-08-27 PROCEDURE — 80053 COMPREHEN METABOLIC PANEL: CPT | Mod: HCNC | Performed by: EMERGENCY MEDICINE

## 2024-08-27 PROCEDURE — 25000003 PHARM REV CODE 250: Mod: HCNC | Performed by: INTERNAL MEDICINE

## 2024-08-27 RX ORDER — ALUMINUM HYDROXIDE, MAGNESIUM HYDROXIDE, AND SIMETHICONE 1200; 120; 1200 MG/30ML; MG/30ML; MG/30ML
30 SUSPENSION ORAL
Status: DISCONTINUED | OUTPATIENT
Start: 2024-08-27 | End: 2024-08-27

## 2024-08-27 RX ORDER — CYCLOSPORINE 25 MG/1
75 CAPSULE, LIQUID FILLED ORAL 2 TIMES DAILY
Status: DISCONTINUED | OUTPATIENT
Start: 2024-08-27 | End: 2024-09-01 | Stop reason: HOSPADM

## 2024-08-27 RX ORDER — OXYCODONE AND ACETAMINOPHEN 10; 325 MG/1; MG/1
1 TABLET ORAL EVERY 4 HOURS PRN
Status: DISCONTINUED | OUTPATIENT
Start: 2024-08-27 | End: 2024-09-01 | Stop reason: HOSPADM

## 2024-08-27 RX ORDER — HYDROMORPHONE HYDROCHLORIDE 1 MG/ML
0.5 INJECTION, SOLUTION INTRAMUSCULAR; INTRAVENOUS; SUBCUTANEOUS EVERY 4 HOURS PRN
Status: DISCONTINUED | OUTPATIENT
Start: 2024-08-27 | End: 2024-09-01 | Stop reason: HOSPADM

## 2024-08-27 RX ORDER — TALC
6 POWDER (GRAM) TOPICAL NIGHTLY PRN
Status: DISCONTINUED | OUTPATIENT
Start: 2024-08-27 | End: 2024-08-28

## 2024-08-27 RX ORDER — TRAMADOL HYDROCHLORIDE 50 MG/1
50 TABLET ORAL EVERY 6 HOURS PRN
COMMUNITY

## 2024-08-27 RX ORDER — VITAMIN E 268 MG
400 CAPSULE ORAL DAILY
COMMUNITY

## 2024-08-27 RX ORDER — HYDROMORPHONE HYDROCHLORIDE 1 MG/ML
1 INJECTION, SOLUTION INTRAMUSCULAR; INTRAVENOUS; SUBCUTANEOUS
Status: COMPLETED | OUTPATIENT
Start: 2024-08-27 | End: 2024-08-27

## 2024-08-27 RX ORDER — CARVEDILOL 6.25 MG/1
6.25 TABLET ORAL 2 TIMES DAILY WITH MEALS
Status: DISCONTINUED | OUTPATIENT
Start: 2024-08-27 | End: 2024-09-01 | Stop reason: HOSPADM

## 2024-08-27 RX ORDER — HYDRALAZINE HYDROCHLORIDE 25 MG/1
25 TABLET, FILM COATED ORAL EVERY 8 HOURS
Status: DISCONTINUED | OUTPATIENT
Start: 2024-08-27 | End: 2024-09-01 | Stop reason: HOSPADM

## 2024-08-27 RX ORDER — CYCLOSPORINE 100 MG/1
100 CAPSULE, GELATIN COATED ORAL EVERY MORNING
Status: DISCONTINUED | OUTPATIENT
Start: 2024-08-28 | End: 2024-08-27

## 2024-08-27 RX ORDER — HEPARIN SODIUM 5000 [USP'U]/ML
5000 INJECTION, SOLUTION INTRAVENOUS; SUBCUTANEOUS EVERY 8 HOURS
Status: DISCONTINUED | OUTPATIENT
Start: 2024-08-27 | End: 2024-09-01 | Stop reason: HOSPADM

## 2024-08-27 RX ORDER — ACETAMINOPHEN 325 MG/1
650 TABLET ORAL EVERY 8 HOURS PRN
Status: DISCONTINUED | OUTPATIENT
Start: 2024-08-27 | End: 2024-09-01 | Stop reason: HOSPADM

## 2024-08-27 RX ORDER — PANTOPRAZOLE SODIUM 40 MG/1
40 TABLET, DELAYED RELEASE ORAL DAILY
Status: DISCONTINUED | OUTPATIENT
Start: 2024-08-28 | End: 2024-09-01 | Stop reason: HOSPADM

## 2024-08-27 RX ORDER — CYCLOSPORINE 100 MG/1
100 CAPSULE, GELATIN COATED ORAL DAILY
COMMUNITY
End: 2024-08-27 | Stop reason: DRUGHIGH

## 2024-08-27 RX ORDER — METHOCARBAMOL 750 MG/1
750 TABLET, FILM COATED ORAL 3 TIMES DAILY
Status: DISCONTINUED | OUTPATIENT
Start: 2024-08-27 | End: 2024-09-01 | Stop reason: HOSPADM

## 2024-08-27 RX ORDER — HYDRALAZINE HYDROCHLORIDE 25 MG/1
25 TABLET, FILM COATED ORAL EVERY 8 HOURS
COMMUNITY

## 2024-08-27 RX ORDER — OXYCODONE AND ACETAMINOPHEN 10; 325 MG/1; MG/1
1 TABLET ORAL EVERY 6 HOURS PRN
COMMUNITY
Start: 2024-08-12

## 2024-08-27 RX ORDER — SODIUM CHLORIDE 9 MG/ML
INJECTION, SOLUTION INTRAVENOUS CONTINUOUS
Status: DISCONTINUED | OUTPATIENT
Start: 2024-08-27 | End: 2024-08-29

## 2024-08-27 RX ORDER — ASPIRIN 81 MG/1
81 TABLET ORAL DAILY
Status: DISCONTINUED | OUTPATIENT
Start: 2024-08-28 | End: 2024-09-01 | Stop reason: HOSPADM

## 2024-08-27 RX ORDER — MORPHINE SULFATE 4 MG/ML
2 INJECTION, SOLUTION INTRAMUSCULAR; INTRAVENOUS EVERY 6 HOURS PRN
Status: DISCONTINUED | OUTPATIENT
Start: 2024-08-27 | End: 2024-08-27

## 2024-08-27 RX ORDER — SUCRALFATE 1 G/10ML
1 SUSPENSION ORAL EVERY 6 HOURS
Status: DISCONTINUED | OUTPATIENT
Start: 2024-08-27 | End: 2024-08-27

## 2024-08-27 RX ORDER — PREGABALIN 25 MG/1
50 CAPSULE ORAL 2 TIMES DAILY
Status: DISCONTINUED | OUTPATIENT
Start: 2024-08-27 | End: 2024-09-01 | Stop reason: HOSPADM

## 2024-08-27 RX ORDER — NIFEDIPINE 30 MG/1
30 TABLET, EXTENDED RELEASE ORAL DAILY
Status: DISCONTINUED | OUTPATIENT
Start: 2024-08-28 | End: 2024-09-01 | Stop reason: HOSPADM

## 2024-08-27 RX ORDER — RALOXIFENE HYDROCHLORIDE 60 MG/1
60 TABLET, FILM COATED ORAL DAILY
COMMUNITY

## 2024-08-27 RX ORDER — SODIUM CHLORIDE 0.9 % (FLUSH) 0.9 %
10 SYRINGE (ML) INJECTION
Status: DISCONTINUED | OUTPATIENT
Start: 2024-08-27 | End: 2024-09-01 | Stop reason: HOSPADM

## 2024-08-27 RX ORDER — ONDANSETRON HYDROCHLORIDE 2 MG/ML
4 INJECTION, SOLUTION INTRAVENOUS
Status: COMPLETED | OUTPATIENT
Start: 2024-08-27 | End: 2024-08-27

## 2024-08-27 RX ORDER — POLYETHYLENE GLYCOL 3350 17 G/17G
17 POWDER, FOR SOLUTION ORAL DAILY
Status: DISCONTINUED | OUTPATIENT
Start: 2024-08-28 | End: 2024-08-30

## 2024-08-27 RX ORDER — POLYETHYLENE GLYCOL 3350 17 G/17G
17 POWDER, FOR SOLUTION ORAL DAILY
COMMUNITY

## 2024-08-27 RX ORDER — OXYCODONE AND ACETAMINOPHEN 10; 325 MG/1; MG/1
1 TABLET ORAL EVERY 6 HOURS PRN
Status: DISCONTINUED | OUTPATIENT
Start: 2024-08-27 | End: 2024-08-27

## 2024-08-27 RX ORDER — ONDANSETRON HYDROCHLORIDE 2 MG/ML
4 INJECTION, SOLUTION INTRAVENOUS EVERY 8 HOURS PRN
Status: DISCONTINUED | OUTPATIENT
Start: 2024-08-27 | End: 2024-09-01 | Stop reason: HOSPADM

## 2024-08-27 RX ADMIN — HYDRALAZINE HYDROCHLORIDE 25 MG: 25 TABLET ORAL at 09:08

## 2024-08-27 RX ADMIN — CYCLOSPORINE 75 MG: 25 CAPSULE, LIQUID FILLED ORAL at 09:08

## 2024-08-27 RX ADMIN — SODIUM CHLORIDE: 9 INJECTION, SOLUTION INTRAVENOUS at 08:08

## 2024-08-27 RX ADMIN — CARVEDILOL 6.25 MG: 6.25 TABLET, FILM COATED ORAL at 05:08

## 2024-08-27 RX ADMIN — HYDROMORPHONE HYDROCHLORIDE 0.5 MG: 1 INJECTION, SOLUTION INTRAMUSCULAR; INTRAVENOUS; SUBCUTANEOUS at 11:08

## 2024-08-27 RX ADMIN — SODIUM CHLORIDE: 9 INJECTION, SOLUTION INTRAVENOUS at 05:08

## 2024-08-27 RX ADMIN — HEPARIN SODIUM 5000 UNITS: 5000 INJECTION INTRAVENOUS; SUBCUTANEOUS at 09:08

## 2024-08-27 RX ADMIN — METHOCARBAMOL 750 MG: 750 TABLET ORAL at 08:08

## 2024-08-27 RX ADMIN — ONDANSETRON 4 MG: 2 INJECTION INTRAMUSCULAR; INTRAVENOUS at 12:08

## 2024-08-27 RX ADMIN — PREGABALIN 50 MG: 25 CAPSULE ORAL at 11:08

## 2024-08-27 RX ADMIN — HYDROMORPHONE HYDROCHLORIDE 1 MG: 1 INJECTION, SOLUTION INTRAMUSCULAR; INTRAVENOUS; SUBCUTANEOUS at 12:08

## 2024-08-27 RX ADMIN — HYDROMORPHONE HYDROCHLORIDE 1 MG: 1 INJECTION, SOLUTION INTRAMUSCULAR; INTRAVENOUS; SUBCUTANEOUS at 03:08

## 2024-08-27 RX ADMIN — MORPHINE SULFATE 2 MG: 4 INJECTION INTRAVENOUS at 08:08

## 2024-08-27 NOTE — TELEPHONE ENCOUNTER
----- Message from Chan Shaffer sent at 8/27/2024 12:54 PM CDT -----  Contact: Marnie  .Type:  Needs Medical Advice    Who Called: [facility] Marnie speech pathologist    Would the patient rather a call back or a response via [facility]? C all back  Best Call Back Number: [phone]  Additional Information:  [facility] is requesting a call back from the nurse. Pt. Was seen on 08/23/2024 in the office and they need the report resend to them.  Fax# [phone]

## 2024-08-27 NOTE — HPI
Nadia Damon is a 69 year old female with a PMHx of OA, Anxiety, CHF, CKD, CAD, Depression, Fibromyalgia, HTN, Heart transplant, GUSTAVO and Hypothyroidism who presented the Emergency Department with c/o acute on chronic back pain. Patient reports back pain as worsened over the last few days. She mentioned being scheduled for steroid injection but unable to get sooner appt. She was seen by Dr. Canales's PA (Neurosurgery) on 8/20/24 for same symptom complaint. Patient unable to undergo MRI so CT myelogram performed.  There is a disc herniation at T11-T12 causing moderate central spinal stenosis and severe right-sided foraminal stenosis. Recommended continued rehab at this time. ED workup showed: WBC count 3.45K, Hgb/Hct 9.0/27.9, Na+ 142, K+ 4.0, BUN 60, creatinine 5.5, UA negative for infectious process. Pt placed in observation for acute on chronic renal failure.

## 2024-08-27 NOTE — PLAN OF CARE
[facility]  [facility]       Primary Care Provider: Elis Wick MD    Admission Diagnosis: GRACE (acute kidney injury) [N17.9]    Admission Date: 8/27/2024  Expected Discharge Date:     Transition of Care Barriers: (P) None    Payor: HU[employer] MANAGED MEDICARE / Plan: HU[employer] SNP HMO PPO SPECIAL NEEDS / Product Type: Medicare Advantage /     Extended Emergency Contact Information  Primary Emergency Contact: Kierra Mcconnell  Mobile Phone: [phone]  Relation: Friend  Secondary Emergency Contact: Simona Hess  Address: [address] [place] [address] [facility] [place]  Home Phone: [phone]  Mobile Phone: [phone]  Relation: Other    Discharge Plan A: (P) Home         [id number] - [place] - [address] AT [place] [address]  [place] [address]  Phone: [phone] Fax: [phone]    [facility] - [facility] [address]  [address] [address]  Phone: [phone] Fax: [phone]    [facility] [address] [place] [address]  Phone: [phone] Fax: [phone]      Initial Assessment (most recent)       Adult Discharge Assessment - 08/27/24 1622          Discharge Assessment    Assessment Type Discharge Planning Assessment     Confirmed/corrected address, phone number and insurance Yes     Confirmed Demographics Correct on Facesheet     Source of Information patient     Does patient/caregiver understand observation status Yes (P)      Communicated RATNA with patient/caregiver No     Reason For Admission Acute Chronic Kidney Failure     People in Home alone     Facility Arrived From: Home (P)      Do you expect to return to your current living situation? Yes (P)      Prior to hospitilization cognitive status: Alert/Oriented (P)      Current cognitive status: Alert/Oriented (P)      Walking or Climbing  Stairs Difficulty yes (P)      Walking or Climbing Stairs ambulation difficulty, requires equipment (P)      Dressing/Bathing Difficulty yes (P)      Dressing/Bathing bathing difficulty, requires equipment (P)      Home Accessibility not wheelchair accessible (P)      Home Layout Able to live on 1st floor (P)      Equipment Currently Used at Home walker, rolling;shower chair;bath bench (P)      Readmission within 30 days? Yes (P)      Patient currently being followed by outpatient case management? Yes (P)      If yes, name of outpatient case management following: insurance company assigned oupatient case management (P)      Do you currently have service(s) that help you manage your care at home? Yes (P)      Name and Contact number of agency [facility] (P)      Is the pt/caregiver preference to resume services with current agency Yes (P)      Do you take prescription medications? Yes (P)      Do you have prescription coverage? Yes (P)      Coverage HU[employer] MANAGED MEDICARE - [employer] \[facility]\"" HMO PPO SPECIAL NEEDS - CAPITATED (P)      Do you have any problems affording any of your prescribed medications? No (P)      Is the patient taking medications as prescribed? yes (P)      Who is going to help you get home at discharge? Family members (P)      How do you get to doctors appointments? family or friend will provide;health plan transportation (P)      Are you on dialysis? No (P)      Do you take coumadin? No (P)      Discharge Plan A Home (P)      DME Needed Upon Discharge  none (P)      Discharge Plan discussed with: Patient (P)      Transition of Care Barriers None (P)         Physical Activity    On average, how many days per week do you engage in moderate to strenuous exercise (like a brisk walk)? 0 days (P)      On average, how many minutes do you engage in exercise at this level? 0 min (P)         Financial Resource Strain    How hard is it for you to pay for the very basics like food, housing, medical care,  and heating? Not hard at all (P)         Housing Stability    In the last 12 months, was there a time when you were not able to pay the mortgage or rent on time? No (P)      At any time in the past 12 months, were you homeless or living in a shelter (including now)? No (P)         Transportation Needs    Has the lack of transportation kept you from medical appointments, meetings, work or from getting things needed for daily living? No (P)         Food Insecurity    Within the past 12 months, you worried that your food would run out before you got the money to buy more. Never true (P)      Within the past 12 months, the food you bought just didn't last and you didn't have money to get more. Never true (P)         Stress    Do you feel stress - tense, restless, nervous, or anxious, or unable to sleep at night because your mind is troubled all the time - these days? Not at all (P)         Social Isolation    How often do you feel lonely or isolated from those around you?  Never (P)         Alcohol Use    Q1: How often do you have a drink containing alcohol? Never (P)      Q2: How many drinks containing alcohol do you have on a typical day when you are drinking? Patient does not drink (P)      Q3: How often do you have six or more drinks on one occasion? Never (P)         SmartPill    In the past 12 months has the electric, gas, oil, or water company threatened to shut off services in your home? No (P)         Health Literacy    How often do you need to have someone help you when you read instructions, pamphlets, or other written material from your doctor or pharmacy? Never (P)                    Rebeka met with patient at the bedside to complete initial assessment. Patient states she uses a rolling walking, bath transfer chair at home and no other equipment.  Patient reports having HH in the home but not sure the name of the agency. Patient states [facility] helped setup her HH and outpatient case management services. Patient  does not receive any services from the Coumadin clinic. Patient reports not being on any bloodthinnner medications. Patient does not receive any other outside services.  Patient uses [facility] or [facility] [address] [facility] for all prescription needs. Patient states she uses Medical transportation for her transportation needs to and from medical appointments. When asked about the SDOH patient denies having any current barriers. No needs at this time.

## 2024-08-27 NOTE — H&P
"  [facility] - Emergency Dept.  [facility] Medicine  History & Physical    Patient Name: Nadia Damon  MRN: [id number]  Patient Class: OP- Observation  Admission Date: 8/27/2024  Attending Physician: Vesta Romero MD   Primary Care Provider: Elis Wick MD         Patient information was obtained from patient and ER records.     Subjective:     Principal Problem:Acute on chronic kidney failure    Chief Complaint:   Chief Complaint   Patient presents with    Back Pain     Pt. Presents to ED via EMS due to having chronic back pain that is worse today, pt also states she has not taken any pain medication in a "few days"         HPI: Nadia Damon is a 69 year old female with a PMHx of OA, Anxiety, CHF, CKD, CAD, Depression, Fibromyalgia, HTN, Heart transplant, GUSTAVO and Hypothyroidism who presented the Emergency Department with c/o acute on chronic back pain. Patient reports back pain as worsened over the last few days. She mentioned being scheduled for steroid injection but unable to get sooner appt. She was seen by Dr. Canales's PA (Neurosurgery) on 8/20/24 for same symptom complaint. Patient unable to undergo MRI so CT myelogram performed.  There is a disc herniation at T11-T12 causing moderate central spinal stenosis and severe right-sided foraminal stenosis. Recommended continued rehab at this time. ED workup showed: WBC count 3.45K, Hgb/Hct 9.0/27.9, Na+ 142, K+ 4.0, BUN 60, creatinine 5.5, UA negative for infectious process. Pt placed in observation for acute on chronic renal failure.     Past Medical History:   Diagnosis Date    Abdominal wall hernia     CT Renal 6/11/2018---Small fat containing superior ventral abdominal wall hernia at the epicardial pacing lead site.    Abnormal mammogram 10/12/2021    Acute cystitis without hematuria 05/10/2022    GRACE (acute kidney injury) 11/22/2021    Anxiety     Arthritis     ZEN HIPS    Breast cancer in female 08/2021    LEFT BREAST    Bronchitis 08/18/2016    " Never smoked      C. difficile colitis 11/29/2021    Cellulitis of axilla, left 12/23/2021    Chronic diastolic heart failure 12/16/2021    Chronic kidney disease     stage 4, GFR 15-29 ml/min    Chronic midline low back pain without sciatica 06/18/2018    Closed nondisplaced fracture of distal phalanx of left great toe with routine healing 10/22/2018    Coronary artery disease 1993    heart transplant    COVID-19 in immunocompromised patient 02/26/2024    Cystitis 05/10/2022    Depression     Encounter for blood transfusion     Fibromyalgia     on Lyrica    Heart failure     native heart cardiomyopathy    Heart transplanted 1993    due to cardiomyopathy    History of hyperparathyroidism; Hyperparathyroidism, secondary renal     PT DENIES    Hypertension     Immune disorder     anti rejection meds    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    Impaired mobility 07/28/2022    Iron deficiency anemia 08/15/2017    Kidney stones     passed per pt    Obesity     Obesity (BMI 30.0-34.9) 07/22/2019    Other osteoporosis without current pathological fracture 08/30/2019    Other pancytopenia 05/06/2024    Parotitis, acute 09/19/2023    Pharyngitis 01/09/2019    Pneumonia due to infectious organism 03/14/2024    PONV (postoperative nausea and vomiting)     Severe sepsis 11/22/2021    Shingles 2003 approx    left leg    Subclinical hypothyroidism 06/16/2023    Thrombocytopenia, unspecified 11/29/2021    Trouble in sleeping     Urinary incontinence        Past Surgical History:   Procedure Laterality Date    bladder implant Right 06/11/2024    BLADDER SURGERY  2015 approx    mesh - Dr Everett then 2nd reconstructive sx Dr Onofre    BREAST BIOPSY Bilateral     NEGATIVE    BREAST BIOPSY Right 10/31/2022    benign    BREAST LUMPECTOMY Left 2021    BREAST SURGERY Left 09/28/2015    Bx - benign    BREAST SURGERY Right 12/2015    Bx benign    CARDIAC PACEMAKER REMOVAL Left 06/26/2014    Pacer defirillator removed.  Put in 1993 aat time of heart transplant    CARPAL TUNNEL RELEASE Left 03/03/2015    Dr. Hall    COLONOSCOPY N/A 02/25/2021    Procedure: COLONOSCOPY;  Surgeon: Freida Ramirez MD;  Location: [facility] ENDO;  Service: Endoscopy;  Laterality: N/A;    CYSTOCELE REPAIR      Twice with mesh removal    EPIDURAL STEROID INJECTION INTO CERVICAL SPINE N/A 02/02/2023    Procedure: T11/T12 IL HELLEN;  Surgeon: Jsasi Pierre MD;  Location: [facility] PAIN MGT;  Service: Pain Management;  Laterality: N/A;    HEART TRANSPLANT  1993    HERNIA REPAIR Right 1971 approx    Inguinal    HYSTERECTOMY  1983    vag hyst /LSO     IMPLANTATION OF PERMANENT SACRAL NERVE STIMULATOR N/A 06/11/2024    Procedure: INSERTION, NEUROSTIMULATOR, PERMANENT, SACRAL;  Surgeon: Sami Cochran MD;  Location: [facility] OR;  Service: Urology;  Laterality: N/A;    INCISION AND DRAINAGE OF ABSCESS Left 12/24/2021    Procedure: INCISION AND DRAINAGE, ABSCESS;  Surgeon: Joseph Longo MD;  Location: [facility] OR;  Service: General;  Laterality: Left;    INJECTION OF ANESTHETIC AGENT AROUND MEDIAL BRANCH NERVES INNERVATING LUMBAR FACET JOINT Right 10/19/2022    Procedure: Right L4/L5 and L5/S1 MBB;  Surgeon: Jassi Pierre MD;  Location: [facility] PAIN MGT;  Service: Pain Management;  Laterality: Right;    INJECTION OF ANESTHETIC AGENT AROUND MEDIAL BRANCH NERVES INNERVATING LUMBAR FACET JOINT Right 11/09/2022    Procedure: Right L4/L5 and L5/S1 MBB;  Surgeon: Jassi Pierre MD;  Location: [facility] PAIN MGT;  Service: Pain Management;  Laterality: Right;    INJECTION OF ANESTHETIC AGENT INTO SACROILIAC JOINT Right 08/22/2022    Procedure: Right SIJ Injection Right L5/S1 Facte Injection;  Surgeon: Jassi Pierre MD;  Location: [facility] PAIN MGT;  Service: Pain Management;  Laterality: Right;    INSERTION OF TUNNELED CENTRAL VENOUS CATHETER (CVC) WITH SUBCUTANEOUS PORT N/A 11/09/2021    Procedure: [id number];  Surgeon: Christoph Douglas MD;  Location: [facility] OR;   Service: General;  Laterality: N/A;    RADIOFREQUENCY THERMOCOAGULATION Right 12/07/2022    Procedure: Right L4/L5 and L5/S1 Lumbar RFA;  Surgeon: Jassi Pierre MD;  Location: [facility];  Service: Pain Management;  Laterality: Right;    REMOVAL OF VASCULAR ACCESS PORT      ROBOT-ASSISTED LAPAROSCOPIC ABDOMINAL SACROCOLPOPEXY N/A 08/10/2023    Procedure: ROBOTIC SACROCOLPOPEXY, ABDOMEN;  Surgeon: PRANAY Villalobos MD;  Location: [facility] OR;  Service: OB/GYN;  Laterality: N/A;    ROBOT-ASSISTED LAPAROSCOPIC OOPHORECTOMY Right 08/10/2023    Procedure: ROBOTIC OOPHORECTOMY;  Surgeon: PRANAY Villalobos MD;  Location: [facility] OR;  Service: OB/GYN;  Laterality: Right;    SENTINEL LYMPH NODE BIOPSY Left 10/12/2021    Procedure: BIOPSY, LYMPH NODE, SENTINEL;  Surgeon: Christoph Douglas MD;  Location: [facility] OR;  Service: General;  Laterality: Left;    TOE SURGERY      XI ROBOTIC URETHROPEXY N/A 08/10/2023    Procedure: XI ROBOTIC URETHROPEXY;  Surgeon: PRANAY Villalobos MD;  Location: [facility] OR;  Service: OB/GYN;  Laterality: N/A;       Review of patient's allergies indicates:   Allergen Reactions    Lisinopril Swelling and Rash    Augmentin [amoxicillin-pot clavulanate] Diarrhea    Zyvox [linezolid] Nausea And Vomiting       No current facility-administered medications on file prior to encounter.     Current Outpatient Medications on File Prior to Encounter   Medication Sig    aspirin (ECOTRIN) 81 MG EC tablet Take 1 tablet (81 mg total) by mouth once daily.    carvediloL (COREG) 6.25 MG tablet Take 1 tablet (6.25 mg total) by mouth 2 (two) times daily with meals. Hold parameters: SBP less than 110 and/or DBP less than 75    cycloSPORINE (SANDIMMUNE) 100 MG Cap Take 100 mg by mouth once daily.    cycloSPORINE modified, NEORAL, (NEORAL) 25 MG capsule Take 3 capsules (75 mg total) by mouth 2 (two) times daily.    furosemide (LASIX) 20 MG tablet TAKE 1 TABLET EVERY DAY    hydrALAZINE (APRESOLINE) 25 MG tablet Take 25 mg by  mouth every 8 (eight) hours.    NIFEdipine (PROCARDIA-XL) 30 MG (OSM) 24 hr tablet TAKE 1 TABLET ONE TIME DAILY (Patient taking differently: Take 30 mg by mouth once daily.)    oxyCODONE-acetaminophen (PERCOCET)  mg per tablet Take 1 tablet by mouth every 6 (six) hours as needed.    pantoprazole (PROTONIX) 20 MG tablet TAKE 1 TABLET ONE TIME DAILY    polyethylene glycol (GLYCOLAX) 17 gram PwPk Take 17 g by mouth once daily.    pregabalin (LYRICA) 50 MG capsule Take 1 capsule (50 mg total) by mouth 2 (two) times daily.    raloxifene (EVISTA) 60 mg tablet Take 60 mg by mouth once daily.    temazepam (RESTORIL) 30 mg capsule TAKE 1 CAPSULE NIGHTLY AS NEEDED FOR INSOMNIA    traMADoL (ULTRAM) 50 mg tablet Take 50 mg by mouth every 6 (six) hours as needed for Pain.    vitamin E 400 UNIT capsule Take 400 Units by mouth once daily.    [DISCONTINUED] atorvastatin (LIPITOR) 40 MG tablet Take 1 tablet (40 mg total) by mouth once daily.    [DISCONTINUED] cholecalciferol, vitamin D3, (VITAMIN D3) 125 mcg (5,000 unit) Tab Take 5,000 Units by mouth once daily.    [DISCONTINUED] EVENING PRIMROSE OIL ORAL Take 1,000 mg by mouth once daily.    [DISCONTINUED] fluticasone propionate (FLONASE) 50 mcg/actuation nasal spray 2 sprays (100 mcg total) by Each Nostril route once daily.    [DISCONTINUED] LIDOcaine (LIDODERM) 5 % PLACE 1 PATCH ONTO THE SKIN DAILY AS NEEDED (BACK PAIN). REMOVE AND DISCARD PATCH WITHIN 12 HOURS OR AS DIRECTED BY MD    [DISCONTINUED] multivitamin capsule Take 1 capsule by mouth once daily.    [DISCONTINUED] patiromer calcium sorbitex (VELTASSA) 8.4 gram PwPk Take 1 packet (8.4 g total) by mouth once daily.    [DISCONTINUED] polyethylene glycol (GLYCOLAX) 17 gram PwPk Take 17 g by mouth once daily.    [DISCONTINUED] RETACRIT 20,000 unit/mL injection     [DISCONTINUED] scopolamine (TRANSDERM-SCOP) 1.3-1.5 mg (1 mg over 3 days) Place 1 patch onto the skin Every 3 (three) days.    [DISCONTINUED] tiZANidine  (ZANAFLEX) 4 MG tablet Take 1 tablet (4 mg total) by mouth every 6 (six) hours as needed (pain).    [DISCONTINUED] UNABLE TO FIND medication name: Stool softener (Ducolax) Laxative    [DISCONTINUED] vitamin E 400 UNIT capsule Take 400 Units by mouth once daily.    [DISCONTINUED] zolpidem (AMBIEN) 5 MG Tab Take 1 tablet (5 mg total) by mouth nightly as needed (insomnia).    methocarbamoL (ROBAXIN) 750 MG Tab Take 750 mg by mouth 3 (three) times daily.     Family History       Problem Relation (Age of Onset)    Breast cancer Mother, Maternal Grandmother    Cancer Mother (38)    Cataracts Cousin    Heart disease Maternal Grandmother    Hypertension Son          Tobacco Use    Smoking status: Never     Passive exposure: Never    Smokeless tobacco: Never   Substance and Sexual Activity    Alcohol use: Never     Alcohol/week: 0.0 standard drinks of alcohol    Drug use: No    Sexual activity: Not Currently     Partners: Male     Birth control/protection: See Surgical Hx     Review of Systems   Constitutional:  Positive for activity change. Negative for chills and fever.   HENT:  Negative for trouble swallowing.    Eyes:  Negative for visual disturbance.   Respiratory:  Negative for cough and shortness of breath.    Cardiovascular:  Negative for chest pain.   Gastrointestinal:  Negative for abdominal pain, constipation, diarrhea, nausea and vomiting.   Genitourinary:  Negative for difficulty urinating.   Musculoskeletal:  Positive for back pain. Negative for neck pain and neck stiffness.   Skin:  Negative for color change.   Neurological:  Negative for dizziness, seizures, syncope, weakness, light-headedness, numbness and headaches.   Psychiatric/Behavioral:  Negative for agitation, behavioral problems and confusion.      Objective:     Vital Signs (Most Recent):  Temp: 98.7 °F (37.1 °C) (08/27/24 1042)  Pulse: 86 (08/27/24 1400)  Resp: 18 (08/27/24 1512)  BP: 139/77 (08/27/24 1400)  SpO2: 100 % (08/27/24 1400) Vital Signs  (24h Range):  Temp:  [98.7 °F (37.1 °C)] 98.7 °F (37.1 °C)  Pulse:  [] 86  Resp:  [18-20] 18  SpO2:  [99 %-100 %] 100 %  BP: (137-160)/(73-88) 139/77     Weight: 66.5 kg (146 lb 9.7 oz)  Body mass index is 26.81 kg/m².     Physical Exam  Vitals reviewed.   Constitutional:       General: She is not in acute distress.     Appearance: She is ill-appearing.   HENT:      Head: Normocephalic.      Mouth/Throat:      Mouth: Mucous membranes are moist.   Eyes:      Extraocular Movements: Extraocular movements intact.   Cardiovascular:      Rate and Rhythm: Normal rate.      Pulses: Normal pulses.   Pulmonary:      Effort: Pulmonary effort is normal. No respiratory distress.      Breath sounds: Normal breath sounds.   Abdominal:      General: Bowel sounds are normal. There is no distension.      Palpations: Abdomen is soft.      Tenderness: There is no abdominal tenderness.   Genitourinary:     Comments: Deferred  Musculoskeletal:      Cervical back: Normal range of motion.      Right lower leg: No edema.      Left lower leg: No edema.   Skin:     General: Skin is warm and dry.   Neurological:      Mental Status: She is alert and oriented to person, place, and time.   Psychiatric:         Mood and Affect: Mood normal.         Behavior: Behavior normal.         Thought Content: Thought content normal.                Significant Labs: All pertinent labs within the past 24 hours have been reviewed.  CBC:   Recent Labs   Lab 08/27/24  1252   WBC 3.45*   HGB 9.0*   HCT 27.9*        CMP:   Recent Labs   Lab 08/27/24  1252      K 4.0      CO2 20*   GLU 73   BUN 60*   CREATININE 5.5*   CALCIUM 9.0   PROT 7.7   ALBUMIN 3.5   BILITOT 0.5   ALKPHOS 99   AST 32   ALT 22   ANIONGAP 13     Urine Studies:   Recent Labs   Lab 08/27/24  1253   COLORU Yellow   APPEARANCEUA Clear   PHUR 6.0   SPECGRAV 1.010   PROTEINUA 1+*   GLUCUA Negative   KETONESU Negative   BILIRUBINUA Negative   OCCULTUA Negative   NITRITE  Negative   UROBILINOGEN Negative   LEUKOCYTESUR Negative   RBCUA 0   WBCUA 2   BACTERIA Rare   SQUAMEPITHEL 2   HYALINECASTS 0       Significant Imaging:   Imaging Results    None        Assessment/Plan:     * Acute on chronic kidney failure  GRACE is likely due to pre-renal azotemia due to intravascular volume depletion. Baseline creatinine is  around 3.0 . Most recent creatinine and eGFR are listed below.  Recent Labs     08/27/24  1252   CREATININE 5.5*   EGFRNORACEVR 8*      Plan  - GRACE is worsening. Will continue current treatment  - Avoid nephrotoxins and renally dose meds for GFR listed above  - Monitor urine output, serial BMP, and adjust therapy as needed  - Consult Nephrology for input.    Chronic diastolic congestive heart failure  Patient is identified as having Diastolic (HFpEF) heart failure that is Chronic. CHF is currently controlled. Latest ECHO performed and demonstrates- Results for orders placed during the hospital encounter of 07/19/24    Echo Saline Bubble? Yes    Interpretation Summary    Left Ventricle: The left ventricle is normal in size. Normal wall thickness. There is normal systolic function with a visually estimated ejection fraction of 55 - 60%. There is normal diastolic function.    Right Ventricle: Normal right ventricular cavity size. Wall thickness is normal. Systolic function is normal.    Left Atrium: Patent foramen ovale visualized with predominant right to left shunting indicated by saline contrast.    Tricuspid Valve: There is mild regurgitation.    IVC/SVC: Normal venous pressure at 3 mmHg.    The patient is status post cardiac transplantation.  . Continue Beta Blocker and monitor clinical status closely. Monitor on telemetry. Patient is off CHF pathway.  Monitor strict Is&Os and daily weights.  Place on fluid restriction of 1.5 L. Cardiology has not been consulted. Continue to stress to patient importance of self efficacy and  on diet for CHF. Last BNP reviewed- and  "noted below No results for input(s): "BNP", "BNPTRIAGEBLO" in the last 168 hours.    Spinal stenosis of lumbosacral region  -Seen by Neurosurgery on 8/20/24 same symptom complaint. Patient unable to undergo MRI so CT myelogram performed.  There is a disc herniation at T11-T12 causing moderate central spinal stenosis and severe right-sided foraminal stenosis. Recommended continued rehab at this time.   -Neurosurgery not on call in [facility].   -Will obtain CT lumbar spine to further evaluate.  -Analgesics PRN.  -Neurovascular checks.      Anemia associated with stage 4 chronic renal failure  Anemia is likely due to chronic disease due to Chronic Kidney Disease. Most recent hemoglobin and hematocrit are listed below.  Recent Labs     08/27/24  1252   HGB 9.0*   HCT 27.9*     Plan  - Monitor serial CBC: Daily  - Transfuse PRBC if patient becomes hemodynamically unstable, symptomatic or H/H drops below 7/21.  - Patient has not received any PRBC transfusions to date  - Patient's anemia is currently stable    Essential hypertension  Chronic, controlled. Latest blood pressure and vitals reviewed-     Temp:  [98.7 °F (37.1 °C)]   Pulse:  []   Resp:  [18-20]   BP: (137-160)/(73-88)   SpO2:  [99 %-100 %] .   Home meds for hypertension were reviewed and noted below.   Hypertension Medications               carvediloL (COREG) 6.25 MG tablet Take 1 tablet (6.25 mg total) by mouth 2 (two) times daily with meals. Hold parameters: SBP less than 110 and/or DBP less than 75    furosemide (LASIX) 20 MG tablet TAKE 1 TABLET EVERY DAY    hydrALAZINE (APRESOLINE) 25 MG tablet Take 25 mg by mouth every 8 (eight) hours.    NIFEdipine (PROCARDIA-XL) 30 MG (OSM) 24 hr tablet TAKE 1 TABLET ONE TIME DAILY            While in the hospital, will manage blood pressure as follows; Continue home antihypertensive regimen    Will utilize p.r.n. blood pressure medication only if patient's blood pressure greater than  170/100  and she " develops symptoms such as worsening chest pain or shortness of breath.    Gastroesophageal reflux disease without esophagitis  -Continue pantoprazole      Heart transplanted  -Open heart transplant on 5/27/93 at [facility]. Followed by [facility] team in [place].  -Continue Cyclosporine.        VTE Risk Mitigation (From admission, onward)           Ordered     heparin (porcine) injection 5,000 Units  Every 8 hours         08/27/24 1638     IP VTE HIGH RISK PATIENT  Once         08/27/24 1638     Place sequential compression device  Until discontinued         08/27/24 1638                            RUDY Marques  Department of Hospital Medicine  '[facility] - Emergency Dept.

## 2024-08-27 NOTE — TELEPHONE ENCOUNTER
Received call from pt advising that she was transported from PT/OT via ambulance to [facility] ED c/o back pain, unable to complete activities during therapy. See related note.

## 2024-08-27 NOTE — ASSESSMENT & PLAN NOTE
-Open heart transplant on 5/27/93 at [facility]. Followed by [facility] team in [place].  -Continue Cyclosporine.

## 2024-08-27 NOTE — ED NOTES
Pt out of bed in room 21 with pt next door comforting pt . SN instructed pt she should not go next door she should use call bell if needed assist.

## 2024-08-27 NOTE — ASSESSMENT & PLAN NOTE
-Seen by Neurosurgery on 8/20/24 same symptom complaint. Patient unable to undergo MRI so CT myelogram performed.  There is a disc herniation at T11-T12 causing moderate central spinal stenosis and severe right-sided foraminal stenosis. Recommended continued rehab at this time.   -Neurosurgery not on call in [facility].   -Will obtain CT lumbar spine to further evaluate.  -Analgesics PRN.  -Neurovascular checks.

## 2024-08-27 NOTE — ASSESSMENT & PLAN NOTE
Anemia is likely due to chronic disease due to Chronic Kidney Disease. Most recent hemoglobin and hematocrit are listed below.  Recent Labs     08/27/24  1252   HGB 9.0*   HCT 27.9*     Plan  - Monitor serial CBC: Daily  - Transfuse PRBC if patient becomes hemodynamically unstable, symptomatic or H/H drops below 7/21.  - Patient has not received any PRBC transfusions to date  - Patient's anemia is currently stable

## 2024-08-27 NOTE — ED PROVIDER NOTES
"SCRIBE #1 NOTE: I, Linda Oh, am scribing for, and in the presence of, Enma Rosado DO. I have scribed the entire note.       History     Chief Complaint   Patient presents with    Back Pain     Pt. Presents to ED via EMS due to having chronic back pain that is worse today, pt also states she has not taken any pain medication in a "few days"      Review of patient's allergies indicates:   Allergen Reactions    Lisinopril Swelling and Rash    Augmentin [amoxicillin-pot clavulanate] Diarrhea    Zyvox [linezolid] Nausea And Vomiting         History of Present Illness     HPI    8/27/2024, 11:29 AM  History obtained from the patient      History of Present Illness: Nadia Damon is a 69 y.o. female patient with a PMHx of HTN, heart transplant, depression, anxiety, obesity, fibromyalgia, anemia, CHF, CKD, CAD, stroke, and acute cystitis who presents to the Emergency Department for evaluation of chronic back pain that has worsened within the last 2 days. Pt reports that she was at PT when the pain became too intense to complete her exercises. She mentions that her doctor is trying to get her scheduled for steroid injections but is unable to get her in for an appt soon. She notes that she had residual right-sided weakness from a previous stroke. Symptoms are constant and severe. No mitigating or exacerbating factors reported. Patient denies any fever, trauma, unexplained weight loss, saddle anesthesia, urinary or bowel retention or incontinence, IV drug use, recent steroid use, or history of malignancy. Prior Tx includes fentanyl given by the ambulance service. No further complaints or concerns at this time.       Arrival mode: EMS    PCP: Elis Wick MD        Past Medical History:  Past Medical History:   Diagnosis Date    Abdominal wall hernia     CT Renal 6/11/2018---Small fat containing superior ventral abdominal wall hernia at the epicardial pacing lead site.    Abnormal mammogram 10/12/2021    " Acute cystitis without hematuria 05/10/2022    GRACE (acute kidney injury) 11/22/2021    Anxiety     Arthritis     ZEN HIPS    Breast cancer in female 08/2021    LEFT BREAST    Bronchitis 08/18/2016    Never smoked      C. difficile colitis 11/29/2021    Cellulitis of axilla, left 12/23/2021    Chronic diastolic heart failure 12/16/2021    Chronic kidney disease     stage 4, GFR 15-29 ml/min    Chronic midline low back pain without sciatica 06/18/2018    Closed nondisplaced fracture of distal phalanx of left great toe with routine healing 10/22/2018    Coronary artery disease 1993    heart transplant    COVID-19 in immunocompromised patient 02/26/2024    Cystitis 05/10/2022    Depression     Encounter for blood transfusion     Fibromyalgia     on Lyrica    Heart failure     native heart cardiomyopathy    Heart transplanted 1993    due to cardiomyopathy    History of hyperparathyroidism; Hyperparathyroidism, secondary renal     PT DENIES    Hypertension     Immune disorder     anti rejection meds    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    Impaired mobility 07/28/2022    Iron deficiency anemia 08/15/2017    Kidney stones     passed per pt    Obesity     Obesity (BMI 30.0-34.9) 07/22/2019    Other osteoporosis without current pathological fracture 08/30/2019    Other pancytopenia 05/06/2024    Parotitis, acute 09/19/2023    Pharyngitis 01/09/2019    Pneumonia due to infectious organism 03/14/2024    PONV (postoperative nausea and vomiting)     Severe sepsis 11/22/2021    Shingles 2003 approx    left leg    Subclinical hypothyroidism 06/16/2023    Thrombocytopenia, unspecified 11/29/2021    Trouble in sleeping     Urinary incontinence        Past Surgical History:  Past Surgical History:   Procedure Laterality Date    bladder implant Right 06/11/2024    BLADDER SURGERY  2015 approx    mesh - Dr Everett then 2nd reconstructive sx Dr Onofre    BREAST BIOPSY Bilateral     NEGATIVE    BREAST BIOPSY  Right 10/31/2022    benign    BREAST LUMPECTOMY Left 2021    BREAST SURGERY Left 09/28/2015    Bx - benign    BREAST SURGERY Right 12/2015    Bx benign    CARDIAC PACEMAKER REMOVAL Left 06/26/2014    Pacer defirillator removed. Put in 1993 aat time of heart transplant    CARPAL TUNNEL RELEASE Left 03/03/2015    Dr. Hall    COLONOSCOPY N/A 02/25/2021    Procedure: COLONOSCOPY;  Surgeon: Freida Ramirez MD;  Location: [facility] ENDO;  Service: Endoscopy;  Laterality: N/A;    CYSTOCELE REPAIR      Twice with mesh removal    EPIDURAL STEROID INJECTION INTO CERVICAL SPINE N/A 02/02/2023    Procedure: T11/T12 IL HELLEN;  Surgeon: Jassi Pierre MD;  Location: [facility] PAIN MGT;  Service: Pain Management;  Laterality: N/A;    HEART TRANSPLANT  1993    HERNIA REPAIR Right 1971 approx    Inguinal    HYSTERECTOMY  1983    vag hyst /LSO     IMPLANTATION OF PERMANENT SACRAL NERVE STIMULATOR N/A 06/11/2024    Procedure: INSERTION, NEUROSTIMULATOR, PERMANENT, SACRAL;  Surgeon: Sami Cochran MD;  Location: [facility] OR;  Service: Urology;  Laterality: N/A;    INCISION AND DRAINAGE OF ABSCESS Left 12/24/2021    Procedure: INCISION AND DRAINAGE, ABSCESS;  Surgeon: Joseph Longo MD;  Location: [facility] OR;  Service: General;  Laterality: Left;    INJECTION OF ANESTHETIC AGENT AROUND MEDIAL BRANCH NERVES INNERVATING LUMBAR FACET JOINT Right 10/19/2022    Procedure: Right L4/L5 and L5/S1 MBB;  Surgeon: Jassi Pierre MD;  Location: [facility] PAIN MGT;  Service: Pain Management;  Laterality: Right;    INJECTION OF ANESTHETIC AGENT AROUND MEDIAL BRANCH NERVES INNERVATING LUMBAR FACET JOINT Right 11/09/2022    Procedure: Right L4/L5 and L5/S1 MBB;  Surgeon: Jassi Pierre MD;  Location: [facility] PAIN MGT;  Service: Pain Management;  Laterality: Right;    INJECTION OF ANESTHETIC AGENT INTO SACROILIAC JOINT Right 08/22/2022    Procedure: Right SIJ Injection Right L5/S1 Facte Injection;  Surgeon: Jassi Pierre MD;  Location: [facility] PAIN MGT;   Service: Pain Management;  Laterality: Right;    INSERTION OF TUNNELED CENTRAL VENOUS CATHETER (CVC) WITH SUBCUTANEOUS PORT N/A 11/09/2021    Procedure: [id number];  Surgeon: Christoph Douglas MD;  Location: [facility] OR;  Service: General;  Laterality: N/A;    RADIOFREQUENCY THERMOCOAGULATION Right 12/07/2022    Procedure: Right L4/L5 and L5/S1 Lumbar RFA;  Surgeon: Jassi Pierre MD;  Location: [facility] PAIN MGT;  Service: Pain Management;  Laterality: Right;    REMOVAL OF VASCULAR ACCESS PORT      ROBOT-ASSISTED LAPAROSCOPIC ABDOMINAL SACROCOLPOPEXY N/A 08/10/2023    Procedure: ROBOTIC SACROCOLPOPEXY, ABDOMEN;  Surgeon: PRANAY Villalobos MD;  Location: [facility] OR;  Service: OB/GYN;  Laterality: N/A;    ROBOT-ASSISTED LAPAROSCOPIC OOPHORECTOMY Right 08/10/2023    Procedure: ROBOTIC OOPHORECTOMY;  Surgeon: PRANAY Villalobos MD;  Location: [facility] OR;  Service: OB/GYN;  Laterality: Right;    SENTINEL LYMPH NODE BIOPSY Left 10/12/2021    Procedure: BIOPSY, LYMPH NODE, SENTINEL;  Surgeon: Christoph Douglas MD;  Location: [facility] OR;  Service: General;  Laterality: Left;    TOE SURGERY      XI ROBOTIC URETHROPEXY N/A 08/10/2023    Procedure: XI ROBOTIC URETHROPEXY;  Surgeon: PRANAY Villalobos MD;  Location: [facility];  Service: OB/GYN;  Laterality: N/A;         Family History:  Family History   Problem Relation Name Age of Onset    Cancer Mother  38        breast    Breast cancer Mother      Breast cancer Maternal Grandmother      Heart disease Maternal Grandmother      Hypertension Son      Cataracts Cousin      Diabetes Neg Hx      Stroke Neg Hx      Kidney disease Neg Hx      Asthma Neg Hx      COPD Neg Hx      Melanoma Neg Hx      Hyperlipidemia Neg Hx         Social History:  Social History     Tobacco Use    Smoking status: Never     Passive exposure: Never    Smokeless tobacco: Never   Substance and Sexual Activity    Alcohol use: Never     Alcohol/week: 0.0 standard drinks of alcohol    Drug use: No    Sexual  activity: Not Currently     Partners: Male     Birth control/protection: See Surgical Hx        Review of Systems     Review of Systems   Constitutional:  Negative for fever.   Respiratory:  Negative for shortness of breath.    Cardiovascular:  Negative for chest pain.   Gastrointestinal:  Negative for nausea and vomiting.   Genitourinary: Negative.    Musculoskeletal:  Positive for back pain.   Neurological:  Positive for weakness. Negative for numbness.        Physical Exam     Initial Vitals [08/27/24 1042]   BP Pulse Resp Temp SpO2   (!) 160/88 102 20 98.7 °F (37.1 °C) 100 %      MAP       --          Physical Exam  Nursing Notes and Vital Signs Reviewed.  Constitutional: Patient is in mild distress. Well-developed and well-nourished.  Head: Atraumatic. Normocephalic.  Eyes: PERRL. EOM intact. Conjunctivae are not pale. No scleral icterus.  ENT: Mucous membranes are moist. Oropharynx is clear and symmetric.    Neck: Supple. Full ROM. No lymphadenopathy.  Cardiovascular: Tachycardic. No murmurs, rubs, or gallops.  Radial and DP pulses are 2+ and symmetric.  Pulmonary/Chest: No respiratory distress. Clear to auscultation bilaterally. No wheezing or rales.  Abdominal: Soft and non-distended.  There is no tenderness.  No rebound, guarding, or rigidity. Good bowel sounds.  Musculoskeletal: Moves all extremities. No obvious deformities. No edema. No calf tenderness. Lower thoracic and lower lumbar tenderness.  Skin: Warm and dry.  Neurological:  Alert, awake, and appropriate.  Normal speech. No acute focal neurological deficits are appreciated.  Psychiatric:  Bizarre affect.      ED Course   Critical Care    Date/Time: 8/27/2024 1:56 PM    Performed by: Enma Rosado DO  Authorized by: Enma Rosado DO  Direct patient critical care time: 15 minutes  Additional history critical care time: 10 minutes  Ordering / reviewing critical care time: 8 minutes  Documentation critical care time: 5 minutes  Consulting  "other physicians critical care time: 8 minutes  Total critical care time (exclusive of procedural time) : 46 minutes  Critical care time was exclusive of separately billable procedures and treating other patients and teaching time.  Critical care was necessary to treat or prevent imminent or life-threatening deterioration of the following conditions: renal failure.  Critical care was time spent personally by me on the following activities: development of treatment plan with patient or surrogate, discussions with consultants, evaluation of patient's response to treatment, examination of patient, obtaining history from patient or surrogate, ordering and performing treatments and interventions, ordering and review of laboratory studies, ordering and review of radiographic studies, review of old charts, re-evaluation of patient's condition and pulse oximetry.        ED Vital Signs:  Vitals:    08/27/24 1042 08/27/24 1232 08/27/24 1259 08/27/24 1300   BP: (!) 160/88 (!) 160/82  137/73   Pulse: 102 95  91   Resp: 20  20 18   Temp: 98.7 °F (37.1 °C)      TempSrc: Oral      SpO2: 100% 99%  100%   Weight: 66.5 kg (146 lb 9.7 oz)      Height: 5' 2" (1.575 m)       08/27/24 1400 08/27/24 1512 08/27/24 1745   BP: 139/77  (!) 145/84   Pulse: 86     Resp: 18 18    Temp:      TempSrc:      SpO2: 100%     Weight:      Height:          Abnormal Lab Results:  Labs Reviewed   CBC W/ AUTO DIFFERENTIAL - Abnormal       Result Value    WBC 3.45 (*)     RBC 3.11 (*)     Hemoglobin 9.0 (*)     Hematocrit 27.9 (*)     MCV 90      MCH 28.9      MCHC 32.3      RDW 16.6 (*)     Platelets 177      MPV 9.6      Immature Granulocytes 0.9 (*)     Gran # (ANC) 1.8      Immature Grans (Abs) 0.03      Lymph # 1.1      Mono # 0.5      Eos # 0.1      Baso # 0.01      nRBC 0      Gran % 52.5      Lymph % 31.3      Mono % 13.6      Eosinophil % 1.4      Basophil % 0.3      Differential Method Automated     COMPREHENSIVE METABOLIC PANEL - Abnormal    " Sodium 142      Potassium 4.0      Chloride 109      CO2 20 (*)     Glucose 73      BUN 60 (*)     Creatinine 5.5 (*)     Calcium 9.0      Total Protein 7.7      Albumin 3.5      Total Bilirubin 0.5      Alkaline Phosphatase 99      AST 32      ALT 22      eGFR 8 (*)     Anion Gap 13     URINALYSIS, REFLEX TO URINE CULTURE - Abnormal    Specimen UA Urine, Catheterized      Color, UA Yellow      Appearance, UA Clear      pH, UA 6.0      Specific Gravity, UA 1.010      Protein, UA 1+ (*)     Glucose, UA Negative      Ketones, UA Negative      Bilirubin (UA) Negative      Occult Blood UA Negative      Nitrite, UA Negative      Urobilinogen, UA Negative      Leukocytes, UA Negative      Narrative:     Specimen Source->Urine   HIV 1 / 2 ANTIBODY    HIV 1/2 Ag/Ab Negative      Narrative:     Release to patient->Immediate   HEPATITIS C ANTIBODY    Hepatitis C Ab Negative      Narrative:     Release to patient->Immediate   HEP C VIRUS HOLD SPECIMEN    HEP C Virus Hold Specimen Hold for HCV sendout      Narrative:     Release to patient->Immediate   URINALYSIS MICROSCOPIC    RBC, UA 0      WBC, UA 2      Bacteria Rare      Squam Epithel, UA 2      Hyaline Casts, UA 0      Microscopic Comment SEE COMMENT      Narrative:     Specimen Source->Urine        All Lab Results:  Results for orders placed or performed during the hospital encounter of 08/27/24   HIV 1/2 Ag/Ab (4th Gen)   Result Value Ref Range    HIV 1/2 Ag/Ab Negative Negative   Hepatitis C Antibody   Result Value Ref Range    Hepatitis C Ab Negative Negative   HCV Virus Hold Specimen   Result Value Ref Range    HEP C Virus Hold Specimen Hold for HCV sendout    CBC auto differential   Result Value Ref Range    WBC 3.45 (L) 3.90 - 12.70 K/uL    RBC 3.11 (L) 4.00 - 5.40 M/uL    Hemoglobin 9.0 (L) 12.0 - 16.0 g/dL    Hematocrit 27.9 (L) 37.0 - 48.5 %    MCV 90 82 - 98 fL    MCH 28.9 27.0 - 31.0 pg    MCHC 32.3 32.0 - 36.0 g/dL    RDW 16.6 (H) 11.5 - 14.5 %    Platelets  177 150 - 450 K/uL    MPV 9.6 9.2 - 12.9 fL    Immature Granulocytes 0.9 (H) 0.0 - 0.5 %    Gran # (ANC) 1.8 1.8 - 7.7 K/uL    Immature Grans (Abs) 0.03 0.00 - 0.04 K/uL    Lymph # 1.1 1.0 - 4.8 K/uL    Mono # 0.5 0.3 - 1.0 K/uL    Eos # 0.1 0.0 - 0.5 K/uL    Baso # 0.01 0.00 - 0.20 K/uL    nRBC 0 0 /100 WBC    Gran % 52.5 38.0 - 73.0 %    Lymph % 31.3 18.0 - 48.0 %    Mono % 13.6 4.0 - 15.0 %    Eosinophil % 1.4 0.0 - 8.0 %    Basophil % 0.3 0.0 - 1.9 %    Differential Method Automated    Comprehensive metabolic panel   Result Value Ref Range    Sodium 142 136 - 145 mmol/L    Potassium 4.0 3.5 - 5.1 mmol/L    Chloride 109 95 - 110 mmol/L    CO2 20 (L) 23 - 29 mmol/L    Glucose 73 70 - 110 mg/dL    BUN 60 (H) 8 - 23 mg/dL    Creatinine 5.5 (H) 0.5 - 1.4 mg/dL    Calcium 9.0 8.7 - 10.5 mg/dL    Total Protein 7.7 6.0 - 8.4 g/dL    Albumin 3.5 3.5 - 5.2 g/dL    Total Bilirubin 0.5 0.1 - 1.0 mg/dL    Alkaline Phosphatase 99 55 - 135 U/L    AST 32 10 - 40 U/L    ALT 22 10 - 44 U/L    eGFR 8 (A) >60 mL/min/1.73 m^2    Anion Gap 13 8 - 16 mmol/L   Urinalysis, Reflex to Urine Culture Urine, Catheterized    Specimen: Urine, Clean Catch   Result Value Ref Range    Specimen UA Urine, Catheterized     Color, UA Yellow Yellow, Straw, [place]    Appearance, UA Clear Clear    pH, UA 6.0 5.0 - 8.0    Specific Gravity, UA 1.010 1.005 - 1.030    Protein, UA 1+ (A) Negative    Glucose, UA Negative Negative    Ketones, UA Negative Negative    Bilirubin (UA) Negative Negative    Occult Blood UA Negative Negative    Nitrite, UA Negative Negative    Urobilinogen, UA Negative <2.0 EU/dL    Leukocytes, UA Negative Negative   Urinalysis Microscopic   Result Value Ref Range    RBC, UA 0 0 - 4 /hpf    WBC, UA 2 0 - 5 /hpf    Bacteria Rare None-Occ /hpf    Squam Epithel, UA 2 /hpf    Hyaline Casts, UA 0 0-1/lpf /lpf    Microscopic Comment SEE COMMENT      *Note: Due to a large number of results and/or encounters for the requested time period,  some results have not been displayed. A complete set of results can be found in Results Review.         Imaging Results:  Imaging Results              CT Lumbar Spine Without Contrast (Final result)  Result time 08/27/24 17:12:03      Final result by Enrique Case MD (08/27/24 17:12:03)                   Impression:      No acute fracture or dislocation.  Mild moderate multilevel degenerative disc disease.  See recent myelogram for additional insights.    All CT scans at this facility are performed  using dose modulation techniques as appropriate to performed exam including the following:  automated exposure control; adjustment of mA and/or kV according to the patients size (this includes techniques or standardized protocols for targeted exams where dose is matched to indication/reason for exam: i.e. extremities or head);  iterative reconstruction technique.      Electronically signed by: Enrique Case  Date:    08/27/2024  Time:    17:12               Narrative:    EXAMINATION:  CT LUMBAR SPINE WITHOUT CONTRAST    CLINICAL HISTORY:  Low back pain, progressive neurologic deficit;worsening low back pain;    TECHNIQUE:  CT lumbar spine without    COMPARISON:  Prior    FINDINGS:  No vertebral body compression deformity.  Normal bone mineral density.  Normal alignment of the vertebral bodies.  No soft tissue abnormality.  Mild moderate multilevel degenerative disc disease.  Atherosclerotic changes.                                                  The Emergency Provider reviewed the vital signs and test results, which are outlined above.     ED Discussion     1:44 PM: Spoke with Refugio Brigdes MD (Nephrology). Recommends admit to hospital medicine for IV fluids and consult him as necessary.    1:50 PM: Discussed case with RUDY Olsen (Hospital Medicine). Dr. Romero agrees with current care and management of pt and accepts admission.   Admitting Service:   Admitting Physician: Dr. Romero  Admit to: OBS Tele      1:50 PM: Re-evaluated pt. I have discussed test results, shared treatment plan, and the need for admission with patient and family at bedside. Pt and family express understanding at this time and agree with all information. All questions answered. Pt and family have no further questions or concerns at this time. Pt is ready for admit.     ED Course as of 08/27/24 1813   Tue Aug 27, 2024   1315 CBC shows chronic leukopenia and anemia at baseline. [NF]   1336 CMP shows worsening CKD.  GFR 8 versus baseline 15-29.  Consistent with GRACE.  UA is unremarkable. [NF]   1337 69-year-old female with a history of CKD stage 4 presents with GRACE on CKD.  GFR at 8 from baseline 15-29.  Patient has a history of heart transplant 30 years ago and CHF therefore we will need gentle fluid resuscitation and nephrology consultation.   [NF]      ED Course User Index  [NF] Enma Rosado, DO       Medical Decision Making  Amount and/or Complexity of Data Reviewed  External Data Reviewed: radiology.     Details: 7/22/2024 CT myelogram lumbar spine:     L4-5 disc bulge with advanced facet arthrosis resulting mild central and foraminal stenosis.     Advanced T11-12 disc degeneration with mild central canal stenosis.  Moderate to severe right and moderate left foraminal stenosis.       Labs: ordered. Decision-making details documented in ED Course.    Risk  Prescription drug management.  Decision regarding hospitalization.       Additional MDM:   Differential Diagnosis:   Musculoskeletal pain, Degenerative disc disease, herniated disc, spinal stenosis, pyelonephritis, kidney stone.              ED Medication(s):  Medications   aspirin EC tablet 81 mg (has no administration in time range)   carvediloL tablet 6.25 mg (6.25 mg Oral Given 8/27/24 5305)   cycloSPORINE modified (NEORAL) (NEORAL) capsule 75 mg (has no administration in time range)   hydrALAZINE tablet 25 mg (has no administration in time range)   methocarbamoL tablet 750 mg (has  no administration in time range)   NIFEdipine 24 hr tablet 30 mg (has no administration in time range)   oxyCODONE-acetaminophen  mg per tablet 1 tablet (has no administration in time range)   polyethylene glycol packet 17 g (has no administration in time range)   sodium chloride 0.9% flush 10 mL (has no administration in time range)   heparin (porcine) injection 5,000 Units (has no administration in time range)   ondansetron injection 4 mg (has no administration in time range)   acetaminophen tablet 650 mg (has no administration in time range)   0.9%  NaCl infusion ( Intravenous New Bag 8/27/24 4428)   morphine injection 2 mg (has no administration in time range)   pantoprazole EC tablet 40 mg (has no administration in time range)   HYDROmorphone injection 1 mg (1 mg Intravenous Given 8/27/24 1259)   ondansetron injection 4 mg (4 mg Intravenous Given 8/27/24 1258)   HYDROmorphone injection 1 mg (1 mg Intravenous Given by Other 8/27/24 1512)       New Prescriptions    No medications on file               Scribe Attestation:   Scribe #1: I performed the above scribed service and the documentation accurately describes the services I performed. I attest to the accuracy of the note.     Attending:   Physician Attestation Statement for Scribe #1: I, Enma Rosado DO, personally performed the services described in this documentation, as scribed by Linda Casiano, in my presence, and it is both accurate and complete.           Clinical Impression       ICD-10-CM ICD-9-CM   1. GRACE (acute kidney injury)  N17.9 584.9   2. Lumbar disc herniation  M51.26 722.10   3. Stage 4 chronic kidney disease  N18.4 585.4   4. Spinal stenosis of thoracolumbar region  M48.05 724.01       Disposition:   Disposition: Placed in Observation  Condition: Stable         Enma Rosado DO  08/27/24 6217

## 2024-08-27 NOTE — ASSESSMENT & PLAN NOTE
Chronic, controlled. Latest blood pressure and vitals reviewed-     Temp:  [98.7 °F (37.1 °C)]   Pulse:  []   Resp:  [18-20]   BP: (137-160)/(73-88)   SpO2:  [99 %-100 %] .   Home meds for hypertension were reviewed and noted below.   Hypertension Medications               carvediloL (COREG) 6.25 MG tablet Take 1 tablet (6.25 mg total) by mouth 2 (two) times daily with meals. Hold parameters: SBP less than 110 and/or DBP less than 75    furosemide (LASIX) 20 MG tablet TAKE 1 TABLET EVERY DAY    hydrALAZINE (APRESOLINE) 25 MG tablet Take 25 mg by mouth every 8 (eight) hours.    NIFEdipine (PROCARDIA-XL) 30 MG (OSM) 24 hr tablet TAKE 1 TABLET ONE TIME DAILY            While in the hospital, will manage blood pressure as follows; Continue home antihypertensive regimen    Will utilize p.r.n. blood pressure medication only if patient's blood pressure greater than  170/100  and she develops symptoms such as worsening chest pain or shortness of breath.

## 2024-08-27 NOTE — ASSESSMENT & PLAN NOTE
"Patient is identified as having Diastolic (HFpEF) heart failure that is Chronic. CHF is currently controlled. Latest ECHO performed and demonstrates- Results for orders placed during the hospital encounter of 07/19/24    Echo Saline Bubble? Yes    Interpretation Summary    Left Ventricle: The left ventricle is normal in size. Normal wall thickness. There is normal systolic function with a visually estimated ejection fraction of 55 - 60%. There is normal diastolic function.    Right Ventricle: Normal right ventricular cavity size. Wall thickness is normal. Systolic function is normal.    Left Atrium: Patent foramen ovale visualized with predominant right to left shunting indicated by saline contrast.    Tricuspid Valve: There is mild regurgitation.    IVC/SVC: Normal venous pressure at 3 mmHg.    The patient is status post cardiac transplantation.  . Continue Beta Blocker and monitor clinical status closely. Monitor on telemetry. Patient is off CHF pathway.  Monitor strict Is&Os and daily weights.  Place on fluid restriction of 1.5 L. Cardiology has not been consulted. Continue to stress to patient importance of self efficacy and  on diet for CHF. Last BNP reviewed- and noted below No results for input(s): "BNP", "BNPTRIAGEBLO" in the last 168 hours.  "

## 2024-08-27 NOTE — PHARMACY MED REC
"Admission Medication History     The home medication history was taken by Ning Nolasco.    You may go to "Admission" then "Reconcile Home Medications" tabs to review and/or act upon these items.     The home medication list has been updated by the Pharmacy department.   Please read ALL comments highlighted in yellow.   Please address this information as you see fit.    Feel free to contact us if you have any questions or require assistance.      The medications listed below were removed from the home medication list. Please reorder if appropriate:  Patient reports no longer taking the following medication(s):  Atorvastatin 40 mg  Vitamin D35,000 units  Evening Primrsoe oil  Flonase 90 mcg  Lidoderm 5% patch  Methocarbamol 750m g  Multivitamin   Veltassa 8.4 gram pwpk  Glycolax 17 pwpk  Retacrit 20,000 unit/mL injectinon  Transderm-scop 1.3-1.5 mg  Tizanadine 4 mg  Ambien 5 mg    Medications listed below were obtained from: [email or web address], [facility]      LAST MED REC COMPLETED:       Ning Nolasco  [id number]      Current Outpatient Medications on File Prior to Encounter   Medication Sig Dispense Refill Last Dose    aspirin (ECOTRIN) 81 MG EC tablet Take 1 tablet (81 mg total) by mouth once daily.   8/26/2024    carvediloL (COREG) 6.25 MG tablet Take 1 tablet (6.25 mg total) by mouth 2 (two) times daily with meals. Hold parameters: SBP less than 110 and/or DBP less than 75 180 tablet 3 8/26/2024    cycloSPORINE (SANDIMMUNE) 100 MG Cap Take 100 mg by mouth once daily.   8/26/2024    cycloSPORINE modified, NEORAL, (NEORAL) 25 MG capsule Take 3 capsules (75 mg total) by mouth 2 (two) times daily. 540 capsule 1 8/26/2024    furosemide (LASIX) 20 MG tablet TAKE 1 TABLET EVERY DAY 90 tablet 3 8/26/2024    hydrALAZINE (APRESOLINE) 25 MG tablet Take 25 mg by mouth every 8 (eight) hours.   8/26/2024    NIFEdipine (PROCARDIA-XL) 30 MG (OSM) 24 hr tablet TAKE 1 TABLET ONE " TIME DAILY (Patient taking differently: Take 30 mg by mouth once daily.) 90 tablet 3 8/27/2024    oxyCODONE-acetaminophen (PERCOCET)  mg per tablet Take 1 tablet by mouth every 6 (six) hours as needed.   8/26/2024    pantoprazole (PROTONIX) 20 MG tablet TAKE 1 TABLET ONE TIME DAILY 90 tablet 3 8/26/2024    polyethylene glycol (GLYCOLAX) 17 gram PwPk Take 17 g by mouth once daily.   8/26/2024    pregabalin (LYRICA) 50 MG capsule Take 1 capsule (50 mg total) by mouth 2 (two) times daily. 180 capsule 0 8/26/2024    raloxifene (EVISTA) 60 mg tablet Take 60 mg by mouth once daily.   8/26/2024    temazepam (RESTORIL) 30 mg capsule TAKE 1 CAPSULE NIGHTLY AS NEEDED FOR INSOMNIA 30 capsule 0 8/26/2024    traMADoL (ULTRAM) 50 mg tablet Take 50 mg by mouth every 6 (six) hours as needed for Pain.   8/26/2024    vitamin E 400 UNIT capsule Take 400 Units by mouth once daily.   8/26/2024    methocarbamoL (ROBAXIN) 750 MG Tab Take 750 mg by mouth 3 (three) times daily.   Unknown                           .

## 2024-08-27 NOTE — ASSESSMENT & PLAN NOTE
GRACE is likely due to pre-renal azotemia due to intravascular volume depletion. Baseline creatinine is  around 3.0 . Most recent creatinine and eGFR are listed below.  Recent Labs     08/27/24  1252   CREATININE 5.5*   EGFRNORACEVR 8*      Plan  - GRACE is worsening. Will continue current treatment  - Avoid nephrotoxins and renally dose meds for GFR listed above  - Monitor urine output, serial BMP, and adjust therapy as needed  - Consult Nephrology for input.

## 2024-08-28 ENCOUNTER — TELEPHONE (OUTPATIENT)
Dept: OTOLARYNGOLOGY | Facility: CLINIC | Age: 69
End: 2024-08-28
Payer: MEDICARE

## 2024-08-28 LAB
ALBUMIN SERPL BCP-MCNC: 3.2 G/DL (ref 3.5–5.2)
ANION GAP SERPL CALC-SCNC: 13 MMOL/L (ref 8–16)
BASOPHILS # BLD AUTO: 0.01 K/UL (ref 0–0.2)
BASOPHILS NFR BLD: 0.4 % (ref 0–1.9)
BUN SERPL-MCNC: 48 MG/DL (ref 8–23)
CALCIUM SERPL-MCNC: 8.5 MG/DL (ref 8.7–10.5)
CHLORIDE SERPL-SCNC: 111 MMOL/L (ref 95–110)
CO2 SERPL-SCNC: 20 MMOL/L (ref 23–29)
CREAT SERPL-MCNC: 4.5 MG/DL (ref 0.5–1.4)
DIFFERENTIAL METHOD BLD: ABNORMAL
EOSINOPHIL # BLD AUTO: 0.1 K/UL (ref 0–0.5)
EOSINOPHIL NFR BLD: 3.2 % (ref 0–8)
ERYTHROCYTE [DISTWIDTH] IN BLOOD BY AUTOMATED COUNT: 16.5 % (ref 11.5–14.5)
EST. GFR  (NO RACE VARIABLE): 10 ML/MIN/1.73 M^2
GLUCOSE SERPL-MCNC: 74 MG/DL (ref 70–110)
HCT VFR BLD AUTO: 27.1 % (ref 37–48.5)
HGB BLD-MCNC: 8.5 G/DL (ref 12–16)
IMM GRANULOCYTES # BLD AUTO: 0.03 K/UL (ref 0–0.04)
IMM GRANULOCYTES NFR BLD AUTO: 1.1 % (ref 0–0.5)
LYMPHOCYTES # BLD AUTO: 1.1 K/UL (ref 1–4.8)
LYMPHOCYTES NFR BLD: 38.4 % (ref 18–48)
MCH RBC QN AUTO: 28.6 PG (ref 27–31)
MCHC RBC AUTO-ENTMCNC: 31.4 G/DL (ref 32–36)
MCV RBC AUTO: 91 FL (ref 82–98)
MONOCYTES # BLD AUTO: 0.5 K/UL (ref 0.3–1)
MONOCYTES NFR BLD: 15.8 % (ref 4–15)
NEUTROPHILS # BLD AUTO: 1.2 K/UL (ref 1.8–7.7)
NEUTROPHILS NFR BLD: 41.1 % (ref 38–73)
NRBC BLD-RTO: 0 /100 WBC
PHOSPHATE SERPL-MCNC: 4.8 MG/DL (ref 2.7–4.5)
PLATELET # BLD AUTO: 160 K/UL (ref 150–450)
PMV BLD AUTO: 10.3 FL (ref 9.2–12.9)
POTASSIUM SERPL-SCNC: 3.7 MMOL/L (ref 3.5–5.1)
RBC # BLD AUTO: 2.97 M/UL (ref 4–5.4)
SODIUM SERPL-SCNC: 144 MMOL/L (ref 136–145)
WBC # BLD AUTO: 2.84 K/UL (ref 3.9–12.7)

## 2024-08-28 PROCEDURE — 97162 PT EVAL MOD COMPLEX 30 MIN: CPT | Mod: HCNC

## 2024-08-28 PROCEDURE — 25000003 PHARM REV CODE 250: Mod: HCNC | Performed by: NURSE PRACTITIONER

## 2024-08-28 PROCEDURE — 85025 COMPLETE CBC W/AUTO DIFF WBC: CPT | Mod: HCNC | Performed by: NURSE PRACTITIONER

## 2024-08-28 PROCEDURE — 96372 THER/PROPH/DIAG INJ SC/IM: CPT | Performed by: NURSE PRACTITIONER

## 2024-08-28 PROCEDURE — 80069 RENAL FUNCTION PANEL: CPT | Mod: HCNC | Performed by: NURSE PRACTITIONER

## 2024-08-28 PROCEDURE — 63600175 PHARM REV CODE 636 W HCPCS: Mod: HCNC | Performed by: NURSE PRACTITIONER

## 2024-08-28 PROCEDURE — 97530 THERAPEUTIC ACTIVITIES: CPT | Mod: HCNC

## 2024-08-28 PROCEDURE — 25000003 PHARM REV CODE 250: Mod: HCNC | Performed by: INTERNAL MEDICINE

## 2024-08-28 PROCEDURE — 21400001 HC TELEMETRY ROOM: Mod: HCNC

## 2024-08-28 PROCEDURE — 99223 1ST HOSP IP/OBS HIGH 75: CPT | Mod: HCNC,,, | Performed by: INTERNAL MEDICINE

## 2024-08-28 PROCEDURE — 36415 COLL VENOUS BLD VENIPUNCTURE: CPT | Mod: HCNC | Performed by: NURSE PRACTITIONER

## 2024-08-28 PROCEDURE — 97166 OT EVAL MOD COMPLEX 45 MIN: CPT | Mod: HCNC

## 2024-08-28 PROCEDURE — 63600175 PHARM REV CODE 636 W HCPCS: Mod: HCNC | Performed by: INTERNAL MEDICINE

## 2024-08-28 RX ORDER — ZOLPIDEM TARTRATE 5 MG/1
5 TABLET ORAL NIGHTLY PRN
Status: DISCONTINUED | OUTPATIENT
Start: 2024-08-28 | End: 2024-09-01 | Stop reason: HOSPADM

## 2024-08-28 RX ORDER — AMOXICILLIN 250 MG
1 CAPSULE ORAL 2 TIMES DAILY
Status: DISCONTINUED | OUTPATIENT
Start: 2024-08-28 | End: 2024-09-01 | Stop reason: HOSPADM

## 2024-08-28 RX ADMIN — PREGABALIN 50 MG: 25 CAPSULE ORAL at 09:08

## 2024-08-28 RX ADMIN — ASPIRIN 81 MG: 81 TABLET, COATED ORAL at 09:08

## 2024-08-28 RX ADMIN — SODIUM CHLORIDE: 9 INJECTION, SOLUTION INTRAVENOUS at 06:08

## 2024-08-28 RX ADMIN — METHOCARBAMOL 750 MG: 750 TABLET ORAL at 09:08

## 2024-08-28 RX ADMIN — NIFEDIPINE 30 MG: 30 TABLET, FILM COATED, EXTENDED RELEASE ORAL at 09:08

## 2024-08-28 RX ADMIN — SENNOSIDES AND DOCUSATE SODIUM 1 TABLET: 50; 8.6 TABLET ORAL at 02:08

## 2024-08-28 RX ADMIN — PANTOPRAZOLE SODIUM 40 MG: 40 TABLET, DELAYED RELEASE ORAL at 09:08

## 2024-08-28 RX ADMIN — ACETAMINOPHEN 650 MG: 325 TABLET ORAL at 09:08

## 2024-08-28 RX ADMIN — HEPARIN SODIUM 5000 UNITS: 5000 INJECTION INTRAVENOUS; SUBCUTANEOUS at 06:08

## 2024-08-28 RX ADMIN — HYDRALAZINE HYDROCHLORIDE 25 MG: 25 TABLET ORAL at 02:08

## 2024-08-28 RX ADMIN — POLYETHYLENE GLYCOL 3350 17 G: 17 POWDER, FOR SOLUTION ORAL at 09:08

## 2024-08-28 RX ADMIN — Medication 6 MG: at 02:08

## 2024-08-28 RX ADMIN — HEPARIN SODIUM 5000 UNITS: 5000 INJECTION INTRAVENOUS; SUBCUTANEOUS at 09:08

## 2024-08-28 RX ADMIN — ZOLPIDEM TARTRATE 5 MG: 5 TABLET ORAL at 09:08

## 2024-08-28 RX ADMIN — ONDANSETRON 4 MG: 2 INJECTION INTRAMUSCULAR; INTRAVENOUS at 12:08

## 2024-08-28 RX ADMIN — ONDANSETRON 4 MG: 2 INJECTION INTRAMUSCULAR; INTRAVENOUS at 09:08

## 2024-08-28 RX ADMIN — OXYCODONE HYDROCHLORIDE AND ACETAMINOPHEN 1 TABLET: 10; 325 TABLET ORAL at 02:08

## 2024-08-28 RX ADMIN — OXYCODONE HYDROCHLORIDE AND ACETAMINOPHEN 1 TABLET: 10; 325 TABLET ORAL at 06:08

## 2024-08-28 RX ADMIN — HYDRALAZINE HYDROCHLORIDE 25 MG: 25 TABLET ORAL at 06:08

## 2024-08-28 RX ADMIN — HEPARIN SODIUM 5000 UNITS: 5000 INJECTION INTRAVENOUS; SUBCUTANEOUS at 02:08

## 2024-08-28 RX ADMIN — HYDROMORPHONE HYDROCHLORIDE 0.5 MG: 1 INJECTION, SOLUTION INTRAMUSCULAR; INTRAVENOUS; SUBCUTANEOUS at 06:08

## 2024-08-28 RX ADMIN — SENNOSIDES AND DOCUSATE SODIUM 1 TABLET: 50; 8.6 TABLET ORAL at 09:08

## 2024-08-28 RX ADMIN — CYCLOSPORINE 75 MG: 25 CAPSULE, LIQUID FILLED ORAL at 09:08

## 2024-08-28 RX ADMIN — METHOCARBAMOL 750 MG: 750 TABLET ORAL at 02:08

## 2024-08-28 RX ADMIN — CARVEDILOL 6.25 MG: 6.25 TABLET, FILM COATED ORAL at 05:08

## 2024-08-28 RX ADMIN — CARVEDILOL 6.25 MG: 6.25 TABLET, FILM COATED ORAL at 07:08

## 2024-08-28 NOTE — PLAN OF CARE
POC reviewed with pt. Pt verbalizes understanding of POC. No questions at this time.  AAOx3.   NSR on cardiac monitor. Continuous pain rating level 10. Pt received norco and dilaudid for pain.  Pt remains free of falls.  No complaints at this time.  Safety measures in place. Will continue to monitor.  Informed pt to call for assistance before getting up. Pt verbalizes understanding.  Hourly rounding and chart check complete.

## 2024-08-28 NOTE — PT/OT/SLP EVAL
Occupational Therapy Evaluation and Treatment    Name: Nadia Damon  MRN: [id number]  Admitting Diagnosis: Acute on chronic kidney failure  Recent Surgery: * No surgery found *      Recommendations:     Discharge Recommendations: High Intensity Therapy  Level of Assistance Recommended: 24 hours significant assistance  Discharge Equipment Recommendations: to be determined by next level of care  Barriers to discharge: None    Assessment:     Nadia Damon is a 69 y.o. female with a medical diagnosis of Acute on chronic kidney failure. She presents with performance deficits affecting function including weakness, impaired functional mobility, decreased safety awareness, impaired endurance, gait instability, impaired balance, impaired self care skills, decreased lower extremity function, decreased upper extremity function.     Rehab Prognosis: Good; patient would benefit from acute OT services to address these deficits and reach maximum level of function.    Plan:     Patient to be seen 2 x/week to address the above listed problems via self-care/home management, therapeutic activities, therapeutic exercises  Plan of Care Expires: 09/11/24  Plan of Care Reviewed with: patient    Subjective     Chief Complaint: debility and generalized weakness  Patient Comments/Goals: get stronger  Pain/Comfort:  Pain Rating 1: 0/10    Patients cultural, spiritual, Rastafarian conflicts given the current situation:      Social History:  Living Environment: Patient lives in assistance living in a single story home with number of outside stair(s): 0  Prior Level of Function: Prior to admission, patient was modified independent  Roles and Routines: Patient was not driving and not working prior to admission.  Equipment Used at Home: walker, rolling  DME owned (not currently used): none  Assistance Upon Discharge: significant other    Objective:     Communicated with epic chart review prior to session. Patient found HOB elevated  and b le  elevatedwith peripheral IV, telemetry, PureWick upon OT entry to room.    General Precautions: Standard, fall   Orthopedic Precautions: N/A   Braces: N/A    Respiratory Status: Room air    Occupational Performance        Bed Mobility:   Rolling/Turning to Left with minimum assistance  Rolling/Turning to Right with minimum assistance  Scooting to HOB in supine: minimum assistance  Scooting anteriorly to EOB to have both feet planted on floor: minimum assistance  Supine to sit from right side of bed with minimum assistance  Supine to sit from left side of bed with minimum assistance    Functional Mobility/Transfers:  Sit <> Stand Transfer with minimum assistance with rolling walker  Functional Mobility: ambulated 20 feet with min a r le noticeable weakness , shaking and buckling    Activities of Daily Living:  Upper Body Dressing: minimum assistance  Lower Body Dressing: minimum assistance  socks  Cognitive/Visual Perceptual:  Cognitive/Psychosocial Skills:    -     Oriented to: Person, Place, Time, Situation  -     Follows Commands/attention: Follows multistep  commands  -     Communication: clear/fluent  -     Memory: No Deficits noted  -     Safety awareness/insight to disability: impaired    Physical Exam:  Upper Extremity Range of Motion:     -       Right Upper Extremity: Deficits: ~90 degrees with resistance felt at end range  -       Left Upper Extremity: ~130 degrees  Upper Extremity Strength:    -       Right Upper Extremity: mmt:-2/5 grossly  -       Left Upper Extremity: mmt2/5 grossly   Strength:    -       Right Upper Extremity: mmt: 3/5 grossly  -       Left Upper Extremity: mmt: 3/5 grossly    AMPAC 6 Click ADL:  AMPAC Total Score: 16    Treatment & Education:  Therapist provided facilitation and instruction of proper body mechanics, energy conservation, and fall prevention strategies during tasks listed above  Patient educated on role of OT, POC, and goals for therapy  Patient educated on  importance of OOB activities with staff member assistance and sitting OOB majority of the day    Patient clear to stand pivot transfer with RN/PCT, assist xstep <pivot transfer with min a and rolling walker .    Patient left HOB elevated with all lines intact and call button in reach.    GOALS:   Multidisciplinary Problems       Occupational Therapy Goals          Problem: Occupational Therapy    Goal Priority Disciplines Outcome Interventions   Occupational Therapy Goal     OT, PT/OT     Description: O.T. goals to be met by 9-11-24  SBA with toilet transfer  S with ue dressing  S with le dressing  Pt will tolerate 1 set x 10 reps b ue rom exercise in all available pain-free planes and ranges                        History:     Past Medical History:   Diagnosis Date    Abdominal wall hernia     CT Renal 6/11/2018---Small fat containing superior ventral abdominal wall hernia at the epicardial pacing lead site.    Abnormal mammogram 10/12/2021    Acute cystitis without hematuria 05/10/2022    GRACE (acute kidney injury) 11/22/2021    Anxiety     Arthritis     ZEN HIPS    Breast cancer in female 08/2021    LEFT BREAST    Bronchitis 08/18/2016    Never smoked      C. difficile colitis 11/29/2021    Cellulitis of axilla, left 12/23/2021    Chronic diastolic heart failure 12/16/2021    Chronic kidney disease     stage 4, GFR 15-29 ml/min    Chronic midline low back pain without sciatica 06/18/2018    Closed nondisplaced fracture of distal phalanx of left great toe with routine healing 10/22/2018    Coronary artery disease 1993    heart transplant    COVID-19 in immunocompromised patient 02/26/2024    Cystitis 05/10/2022    Depression     Encounter for blood transfusion     Fibromyalgia     on Lyrica    Heart failure     native heart cardiomyopathy    Heart transplanted 1993    due to cardiomyopathy    History of hyperparathyroidism; Hyperparathyroidism, secondary renal     PT DENIES    Hypertension     Immune disorder      anti rejection meds    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    Impaired mobility 07/28/2022    Iron deficiency anemia 08/15/2017    Kidney stones     passed per pt    Obesity     Obesity (BMI 30.0-34.9) 07/22/2019    Other osteoporosis without current pathological fracture 08/30/2019    Other pancytopenia 05/06/2024    Parotitis, acute 09/19/2023    Pharyngitis 01/09/2019    Pneumonia due to infectious organism 03/14/2024    PONV (postoperative nausea and vomiting)     Severe sepsis 11/22/2021    Shingles 2003 approx    left leg    Subclinical hypothyroidism 06/16/2023    Thrombocytopenia, unspecified 11/29/2021    Trouble in sleeping     Urinary incontinence          Past Surgical History:   Procedure Laterality Date    bladder implant Right 06/11/2024    BLADDER SURGERY  2015 approx    mesh - Dr Everett then 2nd reconstructive sx Dr Onofre    BREAST BIOPSY Bilateral     NEGATIVE    BREAST BIOPSY Right 10/31/2022    benign    BREAST LUMPECTOMY Left 2021    BREAST SURGERY Left 09/28/2015    Bx - benign    BREAST SURGERY Right 12/2015    Bx benign    CARDIAC PACEMAKER REMOVAL Left 06/26/2014    Pacer defirillator removed. Put in 1993 aat time of heart transplant    CARPAL TUNNEL RELEASE Left 03/03/2015    Dr. Hall    COLONOSCOPY N/A 02/25/2021    Procedure: COLONOSCOPY;  Surgeon: Freida Ramirez MD;  Location: [facility];  Service: Endoscopy;  Laterality: N/A;    CYSTOCELE REPAIR      Twice with mesh removal    EPIDURAL STEROID INJECTION INTO CERVICAL SPINE N/A 02/02/2023    Procedure: T11/T12 IL HELLEN;  Surgeon: Jassi Pierre MD;  Location: [facility];  Service: Pain Management;  Laterality: N/A;    HEART TRANSPLANT  1993    HERNIA REPAIR Right 1971 approx    Inguinal    HYSTERECTOMY  1983    vag hyst /LSO     IMPLANTATION OF PERMANENT SACRAL NERVE STIMULATOR N/A 06/11/2024    Procedure: INSERTION, NEUROSTIMULATOR, PERMANENT, SACRAL;  Surgeon: Sami Cochran MD;  Location: [facility]  OR;  Service: Urology;  Laterality: N/A;    INCISION AND DRAINAGE OF ABSCESS Left 12/24/2021    Procedure: INCISION AND DRAINAGE, ABSCESS;  Surgeon: Joseph Longo MD;  Location: [facility] OR;  Service: General;  Laterality: Left;    INJECTION OF ANESTHETIC AGENT AROUND MEDIAL BRANCH NERVES INNERVATING LUMBAR FACET JOINT Right 10/19/2022    Procedure: Right L4/L5 and L5/S1 MBB;  Surgeon: Jassi Pierre MD;  Location: [facility];  Service: Pain Management;  Laterality: Right;    INJECTION OF ANESTHETIC AGENT AROUND MEDIAL BRANCH NERVES INNERVATING LUMBAR FACET JOINT Right 11/09/2022    Procedure: Right L4/L5 and L5/S1 MBB;  Surgeon: Jassi Pierre MD;  [facility];  Service: Pain Management;  Laterality: Right;    INJECTION OF ANESTHETIC AGENT INTO SACROILIAC JOINT Right 08/22/2022    Procedure: Right SIJ Injection Right L5/S1 Facte Injection;  Surgeon: Jassi Pierre MD;  Location: [facility];  Service: Pain Management;  Laterality: Right;    INSERTION OF TUNNELED CENTRAL VENOUS CATHETER (CVC) WITH SUBCUTANEOUS PORT N/A 11/09/2021    Procedure: [id number];  Surgeon: Christoph Douglas MD;  Location: [facility] OR;  Service: General;  Laterality: N/A;    RADIOFREQUENCY THERMOCOAGULATION Right 12/07/2022    Procedure: Right L4/L5 and L5/S1 Lumbar RFA;  Surgeon: Jassi Pierre MD;  Location: [facility];  Service: Pain Management;  Laterality: Right;    REMOVAL OF VASCULAR ACCESS PORT      ROBOT-ASSISTED LAPAROSCOPIC ABDOMINAL SACROCOLPOPEXY N/A 08/10/2023    Procedure: ROBOTIC SACROCOLPOPEXY, ABDOMEN;  Surgeon: PRANAY Villalobos MD;  Location: [facility] OR;  Service: OB/GYN;  Laterality: N/A;    ROBOT-ASSISTED LAPAROSCOPIC OOPHORECTOMY Right 08/10/2023    Procedure: ROBOTIC OOPHORECTOMY;  Surgeon: PRANAY Villalobos MD;  Location: [facility] OR;  Service: OB/GYN;  Laterality: Right;    SENTINEL LYMPH NODE BIOPSY Left 10/12/2021    Procedure: BIOPSY, LYMPH NODE, SENTINEL;  Surgeon: Christoph Douglas,  MD;  Location: [facility];  Service: General;  Laterality: Left;    TOE SURGERY      XI ROBOTIC URETHROPEXY N/A 08/10/2023    Procedure: XI ROBOTIC URETHROPEXY;  Surgeon: PRANAY Villalobos MD;  Location: [facility];  Service: OB/GYN;  Laterality: N/A;       Time Tracking:     OT Date of Treatment: 08/28/24  OT Start Time: 1515  OT Stop Time: 1545  OT Total Time (min): 30 min    Billable Minutes: Evaluation 15 minutes and Therapeutic Activity 15 minutes    8/28/2024

## 2024-08-28 NOTE — PLAN OF CARE
See eval for details. Pt displayed deficits with functional mobility/ transfers, deficits with adl's skills also decrease b ue strength/endurance. Recommendation: high intensity

## 2024-08-28 NOTE — PLAN OF CARE
A238/A238 NAYEYaneth Damon is a 69 y.o.female admitted on 8/27/2024 for Acute on chronic kidney failure   Code Status: Full Code MRN: [id number]   Review of patient's allergies indicates:   Allergen Reactions    Lisinopril Swelling and Rash    Augmentin [amoxicillin-pot clavulanate] Diarrhea    Zyvox [linezolid] Nausea And Vomiting     Past Medical History:   Diagnosis Date    Abdominal wall hernia     CT Renal 6/11/2018---Small fat containing superior ventral abdominal wall hernia at the epicardial pacing lead site.    Abnormal mammogram 10/12/2021    Acute cystitis without hematuria 05/10/2022    GRACE (acute kidney injury) 11/22/2021    Anxiety     Arthritis     ZEN HIPS    Breast cancer in female 08/2021    LEFT BREAST    Bronchitis 08/18/2016    Never smoked      C. difficile colitis 11/29/2021    Cellulitis of axilla, left 12/23/2021    Chronic diastolic heart failure 12/16/2021    Chronic kidney disease     stage 4, GFR 15-29 ml/min    Chronic midline low back pain without sciatica 06/18/2018    Closed nondisplaced fracture of distal phalanx of left great toe with routine healing 10/22/2018    Coronary artery disease 1993    heart transplant    COVID-19 in immunocompromised patient 02/26/2024    Cystitis 05/10/2022    Depression     Encounter for blood transfusion     Fibromyalgia     on Lyrica    Heart failure     native heart cardiomyopathy    Heart transplanted 1993    due to cardiomyopathy    History of hyperparathyroidism; Hyperparathyroidism, secondary renal     PT DENIES    Hypertension     Immune disorder     anti rejection meds    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    Impaired mobility 07/28/2022    Iron deficiency anemia 08/15/2017    Kidney stones     passed per pt    Obesity     Obesity (BMI 30.0-34.9) 07/22/2019    Other osteoporosis without current pathological fracture 08/30/2019    Other pancytopenia 05/06/2024    Parotitis, acute 09/19/2023    Pharyngitis 01/09/2019     Pneumonia due to infectious organism 03/14/2024    PONV (postoperative nausea and vomiting)     Severe sepsis 11/22/2021    Shingles 2003 approx    left leg    Subclinical hypothyroidism 06/16/2023    Thrombocytopenia, unspecified 11/29/2021    Trouble in sleeping     Urinary incontinence       PRN meds    acetaminophen, 650 mg, Q8H PRN  HYDROmorphone, 0.5 mg, Q4H PRN  melatonin, 6 mg, Nightly PRN  ondansetron, 4 mg, Q8H PRN  oxyCODONE-acetaminophen, 1 tablet, Q4H PRN  sodium chloride 0.9%, 10 mL, PRN      Chart check completed. Will continue plan of care. Pain control continued. Kidney function improving     Orientation: oriented x 4  Meka Coma Scale Score: 15     Lead Monitored: Lead II Rhythm: normal sinus rhythm    Cardiac/Telemetry Box Number: [id number]  VTE Required Core Measure: Pharmacological prophylaxis initiated/maintained Last Bowel Movement: 08/25/24  Diet Renal Standard Tray  Voiding Characteristics: external catheter  Gabe Score: 14  Fall Risk Score: 11  Accucheck []   Freq?      Lines/Drains/Airways       Peripheral Intravenous Line  Duration                  Peripheral IV - Single Lumen 08/27/24 1253 20 G Right Antecubital 1 day

## 2024-08-28 NOTE — PT/OT/SLP EVAL
Physical Therapy Evaluation and Treatment    Patient Name: Nadia Damon   MRN: [id number]  Recent Surgery: * No surgery found *      Recommendations:     Discharge Recommendations: High Intensity Therapy   Discharge Equipment Recommendations: to be determined by next level of care   Barriers to discharge: Decreased caregiver support    Assessment:     Nadia Damon is a 69 y.o. female admitted with a medical diagnosis of Acute on chronic kidney failure. She presents with the following impairments/functional limitations: weakness, impaired endurance, impaired functional mobility, gait instability, impaired balance, pain, decreased safety awareness, decreased lower extremity function, decreased ROM, decreased coordination, impaired cardiopulmonary response to activity.    Patient presents with good participation and motivation to return to prior level of function with high intensity therapy. The patient demonstrates appropriate endurance, strength, and pain control required to participate in up to 3 hours of combined therapy per day.     Rehab Prognosis: Good; patient would benefit from acute PT services to address these deficits and reach maximum level of function.    Plan:     During this hospitalization, patient to be seen 3 x/week to address the above listed problems via gait training, therapeutic activities, therapeutic exercises    Plan of Care Expires: 09/11/24    Subjective     Chief Complaint: Pt is motivated to participate  Patient Comments/Goals: none stated  Pain/Comfort:  Pain Rating 1: 5/10  Location - Side 1: Bilateral  Location - Orientation 1: generalized  Location 1: back  Pain Addressed 1: Pre-medicate for activity, Reposition, Distraction (activity pacing, heat packs)  Pain Rating Post-Intervention 1: 4/10    Social History:  Living Environment: Patient lives in a senior living community in a first floor apartment with number of outside stair(s): 0  Prior Level of Function: Prior to admission,  patient was modified independent, not driving and retired, and ambulated household distances using rolling walker  Equipment Used at Home: walker, rolling, cane, straight, grab bar, shower chair  DME owned (not currently used): none  Assistance Upon Discharge: unknown    Objective:     Communicated with nurse and epic chart review prior to session. Patient found supine with bed alarm, peripheral IV, telemetry, PureWick upon PT entry to room.    General Precautions: Standard, fall   Orthopedic Precautions: N/A   Braces: N/A    Respiratory Status: Room air    Exams:  Cognition: Patient is oriented to Person, Place, Time, Situation  RLE ROM: WFL  RLE Strength:  Grossly 3+/5  LLE ROM: WFL  LLE Strength:  Grossly 3+/5  Sensation:    -       Intact  Skin Integrity/Edema:     -       Skin integrity: Visible skin intact    Functional Mobility:  Gait belt applied - Yes  Bed Mobility  Rolling Right: minimum assistance  Scooting: minimum assistance  Supine to Sit: minimum assistance for LE management and trunk management  Sit to Supine: minimum assistance for LE management and trunk management  Transfers  Sit to Stand: minimum assistance with rolling walker  Gait  Patient ambulated 20ft with rolling walker and minimum assistance. Patient demonstrates unsteady gait, decreased step length, and B knee buckling with fatigue . No c/o dizziness, mild SOB, educated about pursed lip breathing technique and cued for use with mobility. Quick to fatigue. All lines remained intact throughout ambulation trail.  Balance  Sitting: contact guard assistance  Standing: minimum assistance    Therapeutic Activities and Exercises:   Pt educated on role of PT in acute care and POC. Educated on importance of OOB activities, activity pacing, and HEP (marching/hip flex, hip abd, heel slides/LAQ, quad sets, ankle pumps) in order to maintain/regain strength. Encouraged to sit up in chair for all meals. Educated on proper use of RW for safety and to  "reduce risk of falling. Educated on "call don't fall" policy and increased risk of falling due to weakness, instructed to utilize call bell for assistance with all transfers. Pt agreeable to all requests.    AM-PAC 6 CLICK MOBILITY  Total Score:16    Patient left supine with all lines intact, call button in reach, and bed alarm on.    GOALS:   Multidisciplinary Problems       Physical Therapy Goals          Problem: Physical Therapy    Goal Priority Disciplines Outcome Goal Variances Interventions   Physical Therapy Goal     PT, PT/OT      Description: Goals to be met by 9/11/24.  1. Pt will complete bed mobility CGA.  2. Pt will complete sit to stand CGA.  3. Pt will ambulate 50ft CGA using RW.  4. Pt will increase AMPAC score by 2 points to progress functional mobility.                       History:     Past Medical History:   Diagnosis Date    Abdominal wall hernia     CT Renal 6/11/2018---Small fat containing superior ventral abdominal wall hernia at the epicardial pacing lead site.    Abnormal mammogram 10/12/2021    Acute cystitis without hematuria 05/10/2022    GRACE (acute kidney injury) 11/22/2021    Anxiety     Arthritis     ZEN HIPS    Breast cancer in female 08/2021    LEFT BREAST    Bronchitis 08/18/2016    Never smoked      C. difficile colitis 11/29/2021    Cellulitis of axilla, left 12/23/2021    Chronic diastolic heart failure 12/16/2021    Chronic kidney disease     stage 4, GFR 15-29 ml/min    Chronic midline low back pain without sciatica 06/18/2018    Closed nondisplaced fracture of distal phalanx of left great toe with routine healing 10/22/2018    Coronary artery disease 1993    heart transplant    COVID-19 in immunocompromised patient 02/26/2024    Cystitis 05/10/2022    Depression     Encounter for blood transfusion     Fibromyalgia     on Lyrica    Heart failure     native heart cardiomyopathy    Heart transplanted 1993    due to cardiomyopathy    History of hyperparathyroidism; " Hyperparathyroidism, secondary renal     PT DENIES    Hypertension     Immune disorder     anti rejection meds    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    Impaired mobility 07/28/2022    Iron deficiency anemia 08/15/2017    Kidney stones     passed per pt    Obesity     Obesity (BMI 30.0-34.9) 07/22/2019    Other osteoporosis without current pathological fracture 08/30/2019    Other pancytopenia 05/06/2024    Parotitis, acute 09/19/2023    Pharyngitis 01/09/2019    Pneumonia due to infectious organism 03/14/2024    PONV (postoperative nausea and vomiting)     Severe sepsis 11/22/2021    Shingles 2003 approx    left leg    Subclinical hypothyroidism 06/16/2023    Thrombocytopenia, unspecified 11/29/2021    Trouble in sleeping     Urinary incontinence        Past Surgical History:   Procedure Laterality Date    bladder implant Right 06/11/2024    BLADDER SURGERY  2015 approx    mesh - Dr Everett then 2nd reconstructive sx Dr Onofre    BREAST BIOPSY Bilateral     NEGATIVE    BREAST BIOPSY Right 10/31/2022    benign    BREAST LUMPECTOMY Left 2021    BREAST SURGERY Left 09/28/2015    Bx - benign    BREAST SURGERY Right 12/2015    Bx benign    CARDIAC PACEMAKER REMOVAL Left 06/26/2014    Pacer defirillator removed. Put in 1993 aat time of heart transplant    CARPAL TUNNEL RELEASE Left 03/03/2015    Dr. Hall    COLONOSCOPY N/A 02/25/2021    Procedure: COLONOSCOPY;  Surgeon: Freida Ramirez MD;  Location: [facility];  Service: Endoscopy;  Laterality: N/A;    CYSTOCELE REPAIR      Twice with mesh removal    EPIDURAL STEROID INJECTION INTO CERVICAL SPINE N/A 02/02/2023    Procedure: T11/T12 IL HELLEN;  Surgeon: Jassi Pierre MD;  Location: [facility];  Service: Pain Management;  Laterality: N/A;    HEART TRANSPLANT  1993    HERNIA REPAIR Right 1971 approx    Inguinal    HYSTERECTOMY  1983    vag hyst /LSO     IMPLANTATION OF PERMANENT SACRAL NERVE STIMULATOR N/A 06/11/2024    Procedure: INSERTION,  NEUROSTIMULATOR, PERMANENT, SACRAL;  Surgeon: Sami Cochran MD;  Location: [facility] OR;  Service: Urology;  Laterality: N/A;    INCISION AND DRAINAGE OF ABSCESS Left 12/24/2021    Procedure: INCISION AND DRAINAGE, ABSCESS;  Surgeon: Joseph Longo MD;  Location: [facility] OR;  Service: General;  Laterality: Left;    INJECTION OF ANESTHETIC AGENT AROUND MEDIAL BRANCH NERVES INNERVATING LUMBAR FACET JOINT Right 10/19/2022    Procedure: Right L4/L5 and L5/S1 MBB;  Surgeon: Jassi Pierre MD;  Location: [facility];  Service: Pain Management;  Laterality: Right;    INJECTION OF ANESTHETIC AGENT AROUND MEDIAL BRANCH NERVES INNERVATING LUMBAR FACET JOINT Right 11/09/2022    Procedure: Right L4/L5 and L5/S1 MBB;  Surgeon: Jassi Pierre MD;  Location: [facility];  Service: Pain Management;  Laterality: Right;    INJECTION OF ANESTHETIC AGENT INTO SACROILIAC JOINT Right 08/22/2022    Procedure: Right SIJ Injection Right L5/S1 Facte Injection;  Surgeon: Jassi Pierre MD;  Location: [facility];  Service: Pain Management;  Laterality: Right;    INSERTION OF TUNNELED CENTRAL VENOUS CATHETER (CVC) WITH SUBCUTANEOUS PORT N/A 11/09/2021    Procedure: [id number]-CATH;  Surgeon: Christoph Douglas MD;  Location: [facility] OR;  Service: General;  Laterality: N/A;    RADIOFREQUENCY THERMOCOAGULATION Right 12/07/2022    Procedure: Right L4/L5 and L5/S1 Lumbar RFA;  Surgeon: Jassi Pierre MD;  Location: [facility];  Service: Pain Management;  Laterality: Right;    REMOVAL OF VASCULAR ACCESS PORT      ROBOT-ASSISTED LAPAROSCOPIC ABDOMINAL SACROCOLPOPEXY N/A 08/10/2023    Procedure: ROBOTIC SACROCOLPOPEXY, ABDOMEN;  Surgeon: PRANAY Villalobos MD;  Location: [facility] OR;  Service: OB/GYN;  Laterality: N/A;    ROBOT-ASSISTED LAPAROSCOPIC OOPHORECTOMY Right 08/10/2023    Procedure: ROBOTIC OOPHORECTOMY;  Surgeon: PRANAY Villalobos MD;  Location: [facility] OR;  Service: OB/GYN;  Laterality: Right;    SENTINEL LYMPH NODE  BIOPSY Left 10/12/2021    Procedure: BIOPSY, LYMPH NODE, SENTINEL;  Surgeon: Christoph Douglas MD;  Location: [facility] OR;  Service: General;  Laterality: Left;    TOE SURGERY      XI ROBOTIC URETHROPEXY N/A 08/10/2023    Procedure: XI ROBOTIC URETHROPEXY;  Surgeon: PRANAY Villalobos MD;  Location: [facility];  Service: OB/GYN;  Laterality: N/A;       Time Tracking:     PT Received On: 08/28/24  PT Start Time: 1521  PT Stop Time: 1546  PT Total Time (min): 25 min     Billable Minutes: Evaluation 15min and Therapeutic Activity 10min    8/28/2024

## 2024-08-28 NOTE — ASSESSMENT & PLAN NOTE
GRACE is likely due to pre-renal azotemia due to intravascular volume depletion. Baseline creatinine is  around 3.0 . Most recent creatinine and eGFR are listed below.  Recent Labs     08/27/24  1252 08/28/24  0517   CREATININE 5.5* 4.5*   EGFRNORACEVR 8* 10*        Plan  - GRACE is improving. Continue gentle IV fluids  - Nephrology consulted; appreciate recs   - Avoid nephrotoxins and renally dose meds for GFR listed above  - Monitor urine output, serial BMP, and adjust therapy as needed  - Consult Nephrology for input.

## 2024-08-28 NOTE — ASSESSMENT & PLAN NOTE
-Seen by Neurosurgery on 8/20/24 same symptom complaint. Patient unable to undergo MRI so CT myelogram performed.  There is a disc herniation at T11-T12 causing moderate central spinal stenosis and severe right-sided foraminal stenosis. Recommended continued rehab at this time.   - Has with chronic R sided weakness from prev stroke. No bowel/bladder incontinence or sensory deficits   - CT lumbar spine with no acute fracture or dislocation.  Mild moderate multilevel degenerative disc disease   -Analgesics PRN. Robaxin for muscle spasms   -Neurovascular checks.  - PT/OT eval

## 2024-08-28 NOTE — CONSULTS
Nephrology Consultation  Consulting Physician:  Vesta Romero MD  Reason for consultation:  Acute on chronic kidney disease  Informants:  Patient medical records     History of Present Illness     The patient is a 69 y.o. female with a hx of cardiac transplant done 1991 in [facility] due to what sounds like a cardiomyopathy.  Likely as a result of chronic calcineurin inhibitor she has developed chronic kidney disease with a baseline serum creatinine reportedly around 3 followed by Dr. Crawford of [facility].  She recently sustained a CVA afterwards spent several weeks in a rehabilitation facility-recently discharged.  Several days prior to admission she developed worsening back pain and due to chronic narcotic use had developed constipation with nausea and vomiting and poor oral intake.  She presented on 08/27/2024 and laboratory revealed a BUN and creatinine are 60 and 5.5 respectively.  She has been placed on IV fluid therapy and her renal function is actually improving.  She still complains of back pain with a reported scheduled steroid injection sometime in September.    PMHx:    Past Medical History:   Diagnosis Date    Abdominal wall hernia     CT Renal 6/11/2018---Small fat containing superior ventral abdominal wall hernia at the epicardial pacing lead site.    Abnormal mammogram 10/12/2021    Acute cystitis without hematuria 05/10/2022    GRACE (acute kidney injury) 11/22/2021    Anxiety     Arthritis     ZEN HIPS    Breast cancer in female 08/2021    LEFT BREAST    Bronchitis 08/18/2016    Never smoked      C. difficile colitis 11/29/2021    Cellulitis of axilla, left 12/23/2021    Chronic diastolic heart failure 12/16/2021    Chronic kidney disease     stage 4, GFR 15-29 ml/min    Chronic midline low back pain without sciatica 06/18/2018    Closed nondisplaced fracture of distal phalanx of left great toe with routine healing 10/22/2018    Coronary artery disease 1993    heart  transplant    COVID-19 in immunocompromised patient 2024    Cystitis 05/10/2022    Depression     Encounter for blood transfusion     Fibromyalgia     on Lyrica    Heart failure     native heart cardiomyopathy    Heart transplanted     due to cardiomyopathy    History of hyperparathyroidism; Hyperparathyroidism, secondary renal     PT DENIES    Hypertension     Immune disorder     anti rejection meds    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    Impaired mobility 2022    Iron deficiency anemia 08/15/2017    Kidney stones     passed per pt    Obesity     Obesity (BMI 30.0-34.9) 2019    Other osteoporosis without current pathological fracture 2019    Other pancytopenia 2024    Parotitis, acute 2023    Pharyngitis 2019    Pneumonia due to infectious organism 2024    PONV (postoperative nausea and vomiting)     Severe sepsis 2021    Shingles 2003 approx    left leg    Subclinical hypothyroidism 2023    Thrombocytopenia, unspecified 2021    Trouble in sleeping     Urinary incontinence        SocHx:    Social History     Socioeconomic History    Marital status: Single    Number of children: 2    Highest education level: 11th grade   Occupational History    Occupation: Retired   Tobacco Use    Smoking status: Never     Passive exposure: Never    Smokeless tobacco: Never   Substance and Sexual Activity    Alcohol use: Never     Alcohol/week: 0.0 standard drinks of alcohol    Drug use: No    Sexual activity: Not Currently     Partners: Male     Birth control/protection: See Surgical Hx   Other Topics Concern    Are you pregnant or think you may be? No    Breast-feeding No   Social History Narrative    Single. 2 children , 1  at 30 yoa  2014 strep throat -  pneumonia and renal complications after not completing course of AB. Other child lives in [place], [place]. Has a cousin locally that could help in an emergency. Patient still does  some sitter work. On Disability for heart transplant. Caffeine intake =- 1 cola a day. No coffee, + occasional tea, avoids caffeine especially at night. Still drives. She does not have a Living Will or Advanced directive.      Social Determinants of Health     Financial Resource Strain: Low Risk  (8/27/2024)    Overall Financial Resource Strain (CARDIA)     Difficulty of Paying Living Expenses: Not hard at all   Food Insecurity: No Food Insecurity (8/27/2024)    Hunger Vital Sign     Worried About Running Out of Food in the Last Year: Never true     Ran Out of Food in the Last Year: Never true   Transportation Needs: No Transportation Needs (8/27/2024)    TRANSPORTATION NEEDS     Transportation : No   Physical Activity: Inactive (8/27/2024)    Exercise Vital Sign     Days of Exercise per Week: 0 days     Minutes of Exercise per Session: 0 min   Stress: No Stress Concern Present (8/27/2024)    [employer] - Occupational Stress Questionnaire     Feeling of Stress : Not at all   Housing Stability: Low Risk  (8/27/2024)    Housing Stability Vital Sign     Unable to Pay for Housing in the Last Year: No     Homeless in the Last Year: No       FamHx:    Family History   Problem Relation Name Age of Onset    Cancer Mother  38        breast    Breast cancer Mother      Breast cancer Maternal Grandmother      Heart disease Maternal Grandmother      Hypertension Son      Cataracts Cousin      Diabetes Neg Hx      Stroke Neg Hx      Kidney disease Neg Hx      Asthma Neg Hx      COPD Neg Hx      Melanoma Neg Hx      Hyperlipidemia Neg Hx         ROS:      No chest pain or shortness of breath.  Back pain is noted.  No lower extremity edema.     Health Status   Allergies:    is allergic to lisinopril, augmentin [amoxicillin-pot clavulanate], and zyvox [linezolid].    Current medications:     Current Facility-Administered Medications:     0.9%  NaCl infusion, , Intravenous, Continuous, Enma Cotto,  "FNP, Last Rate: 75 mL/hr at 08/28/24 0633, New Bag at 08/28/24 0633    acetaminophen tablet 650 mg, 650 mg, Oral, Q8H PRN, Enma Cotto FNP    aspirin EC tablet 81 mg, 81 mg, Oral, Daily, Enma Cotto, FNP, 81 mg at 08/28/24 0943    carvediloL tablet 6.25 mg, 6.25 mg, Oral, BID WM, Enma Cotto, FNP, 6.25 mg at 08/28/24 0740    cycloSPORINE modified (NEORAL) (NEORAL) capsule 75 mg, 75 mg, Oral, BID, Enma Cotto, FNP, 75 mg at 08/28/24 0943    heparin (porcine) injection 5,000 Units, 5,000 Units, Subcutaneous, Q8H, Enma Cotto FNP, 5,000 Units at 08/28/24 0639    hydrALAZINE tablet 25 mg, 25 mg, Oral, Q8H, Enma Cotto FNP, 25 mg at 08/28/24 0639    HYDROmorphone injection 0.5 mg, 0.5 mg, Intravenous, Q4H PRN, Alma Monreal MD, 0.5 mg at 08/28/24 0639    melatonin tablet 6 mg, 6 mg, Oral, Nightly PRN, Vanessa Iyer NP, 6 mg at 08/28/24 0222    methocarbamoL tablet 750 mg, 750 mg, Oral, TID, Enma Cotto FNP, 750 mg at 08/28/24 0943    NIFEdipine 24 hr tablet 30 mg, 30 mg, Oral, Daily, Enma Cotto FNP, 30 mg at 08/28/24 0943    ondansetron injection 4 mg, 4 mg, Intravenous, Q8H PRN, Enma Cotto FNP, 4 mg at 08/28/24 0034    oxyCODONE-acetaminophen  mg per tablet 1 tablet, 1 tablet, Oral, Q4H PRN, Alma Monreal MD, 1 tablet at 08/28/24 0222    pantoprazole EC tablet 40 mg, 40 mg, Oral, Daily, Enma Cotto FNP, 40 mg at 08/28/24 0943    polyethylene glycol packet 17 g, 17 g, Oral, Daily, Enma Cotto FNP, 17 g at 08/28/24 0943    pregabalin capsule 50 mg, 50 mg, Oral, BID, Alma Monreal MD, 50 mg at 08/28/24 0943    senna-docusate 8.6-50 mg per tablet 1 tablet, 1 tablet, Oral, BID, Vesta Romero MD    sodium chloride 0.9% flush 10 mL, 10 mL, Intravenous, PRN, Enma Cotto FNP     Physical Examination   VS/Measurements   /65   Pulse 79   Temp 98.3 °F (36.8 °C)   Resp 18   Ht 5' 2" (1.575 m)   Wt 63 kg (138 lb 14.2 oz)   LMP " 06/20/1983 (Within Years)   SpO2 97%   BMI 25.40 kg/m²     Physical Exam  Vitals and nursing note reviewed.   Constitutional:       General: She is not in acute distress.     Appearance: She is ill-appearing.   Eyes:      Pupils: Pupils are equal, round, and reactive to light.   Cardiovascular:      Rate and Rhythm: Normal rate and regular rhythm.      Heart sounds:      No friction rub.   Pulmonary:      Effort: Pulmonary effort is normal. No respiratory distress.      Breath sounds: No wheezing or rales.   Abdominal:      Palpations: Abdomen is soft.      Tenderness: There is no abdominal tenderness.   Musculoskeletal:         General: No swelling.   Skin:     General: Skin is warm.   Neurological:      Mental Status: She is alert and oriented to person, place, and time.            Review / Management   Laboratory Results   Today's Lab Results :    Recent Results (from the past 24 hour(s))   Renal function panel    Collection Time: 08/28/24  5:17 AM   Result Value Ref Range    Glucose 74 70 - 110 mg/dL    Sodium 144 136 - 145 mmol/L    Potassium 3.7 3.5 - 5.1 mmol/L    Chloride 111 (H) 95 - 110 mmol/L    CO2 20 (L) 23 - 29 mmol/L    BUN 48 (H) 8 - 23 mg/dL    Calcium 8.5 (L) 8.7 - 10.5 mg/dL    Creatinine 4.5 (H) 0.5 - 1.4 mg/dL    Albumin 3.2 (L) 3.5 - 5.2 g/dL    Phosphorus 4.8 (H) 2.7 - 4.5 mg/dL    eGFR 10 (A) >60 mL/min/1.73 m^2    Anion Gap 13 8 - 16 mmol/L   CBC auto differential    Collection Time: 08/28/24  5:17 AM   Result Value Ref Range    WBC 2.84 (L) 3.90 - 12.70 K/uL    RBC 2.97 (L) 4.00 - 5.40 M/uL    Hemoglobin 8.5 (L) 12.0 - 16.0 g/dL    Hematocrit 27.1 (L) 37.0 - 48.5 %    MCV 91 82 - 98 fL    MCH 28.6 27.0 - 31.0 pg    MCHC 31.4 (L) 32.0 - 36.0 g/dL    RDW 16.5 (H) 11.5 - 14.5 %    Platelets 160 150 - 450 K/uL    MPV 10.3 9.2 - 12.9 fL    Immature Granulocytes 1.1 (H) 0.0 - 0.5 %    Gran # (ANC) 1.2 (L) 1.8 - 7.7 K/uL    Immature Grans (Abs) 0.03 0.00 - 0.04 K/uL    Lymph # 1.1 1.0 - 4.8 K/uL     Mono # 0.5 0.3 - 1.0 K/uL    Eos # 0.1 0.0 - 0.5 K/uL    Baso # 0.01 0.00 - 0.20 K/uL    nRBC 0 0 /100 WBC    Gran % 41.1 38.0 - 73.0 %    Lymph % 38.4 18.0 - 48.0 %    Mono % 15.8 (H) 4.0 - 15.0 %    Eosinophil % 3.2 0.0 - 8.0 %    Basophil % 0.4 0.0 - 1.9 %    Differential Method Automated         Impression and Plan   Diagnosis     Chronic kidney disease-baseline serum creatinine reportedly around 3 followed by [facility] as noted.  --likely related to chronic calcineurin inhibitor    Acute kidney injury-likely related to volume depletion as the patient admits to poor oral intake nausea vomiting and constipation recently  --improving with IV fluid therapy    Cardiac transplant 1991-followed by [facility]/transplant    Anemia-multifactorial    Metabolic acidosis in the setting of acute and chronic renal disease.    Proteinuria-likely in part related to her chronic kidney disease    For now I would suggest continuing gentle volume expansion and serial laboratory measurements.  She should follow-up with [facility] at discharge.   .     ________________________________________________  Huseyin Barrow     This document was created using voice recognition software.  It is possible that there are errors which have persisted after original proofreading.  If there is a question regarding contents of this document please contact me for clarification.

## 2024-08-28 NOTE — NURSING
Pt would like daughter in-law to be contacted about any major changes in care.  Elisa Watkins [phone].

## 2024-08-28 NOTE — HOSPITAL COURSE
CT L spine showed No acute fracture or dislocation.  Mild moderate multilevel degenerative disc disease. PT/OT rec placement on discharge, social work consulted.  Patient preferred follow up with outpatient rehab PT/OT/SLP where she had already established care.  Nephrology consulted.  Epoetin tristan administered for anemia of CKD. Patient started on gentle IV fluids with sodium bicarbonate. Renal function continued to improve with fluid resuscitation.  Electrolyte abnormalities resolving.  Constipation resolved with bowel regimen.     Discharge plan of care was discussed at length with patient including patient's need for close outpatient follow-up with upcoming PCP visit this week including repeat lab work.  Counseled on PCP followup, nephrology followup, cardiology follow up, followup labs, renal diet, medication compliance and potential side effects/adverse events of medications.  Counseled on fluid intake to maintain euvolemic status without dehydration or volume overload as Furosemide was held due to GRACE on CKD. Counseled on adverse effects of pain medications. All questions and concerns were answered. Return precautions provided.  Patient counseled to return to the hospital or seek medical care if baseline status should suddenly change, return of symptoms occurs, or for any new or concerning symptoms that arise.  Patient was in agreement with plan of care going forward. Patient seen and examined. Patient deemed stable for discharge. Patient is medically cleared to resume prior outpatient therapy services.

## 2024-08-28 NOTE — PROGRESS NOTES
[facility]  Progress Note    Patient Name: Nadia Damon  MRN: [id number]  Patient Class: OP- Observation   Admission Date: 8/27/2024  Length of Stay: 0 days  Attending Physician: Vesta Romero MD  Primary Care Provider: Elis Wick MD        Subjective:     Principal Problem:Acute on chronic kidney failure        HPI:  Nadia Damon is a 69 year old female with a PMHx of OA, Anxiety, CHF, CKD, CAD, Depression, Fibromyalgia, HTN, Heart transplant, GUSTAVO and Hypothyroidism who presented the Emergency Department with c/o acute on chronic back pain. Patient reports back pain as worsened over the last few days. She mentioned being scheduled for steroid injection but unable to get sooner appt. She was seen by Dr. Canales's PA (Neurosurgery) on 8/20/24 for same symptom complaint. Patient unable to undergo MRI so CT myelogram performed.  There is a disc herniation at T11-T12 causing moderate central spinal stenosis and severe right-sided foraminal stenosis. Recommended continued rehab at this time. ED workup showed: WBC count 3.45K, Hgb/Hct 9.0/27.9, Na+ 142, K+ 4.0, BUN 60, creatinine 5.5, UA negative for infectious process. Pt placed in observation for acute on chronic renal failure.     Overview/Hospital Course:  Pt started on gentle IV fluids, nephrology consulted.   CT L spine showed No acute fracture or dislocation.  Mild moderate multilevel degenerative disc disease   PT/OT pending     Interval History: NAEON. Pt reports she did not sleep overnight due to pain. PT reports continued pain to lower back radiating down R leg associated with muscle spasms. Denies CP, SOB. + constipation     Review of Systems  Objective:     Vital Signs (Most Recent):  Temp: 98.3 °F (36.8 °C) (08/28/24 1150)  Pulse: 79 (08/28/24 1150)  Resp: 18 (08/28/24 1150)  BP: 134/65 (08/28/24 1150)  SpO2: 97 % (08/28/24 1150) Vital Signs (24h Range):  Temp:  [97.2 °F (36.2 °C)-98.4 °F (36.9 °C)]  98.3 °F (36.8 °C)  Pulse:  [] 79  Resp:  [17-20] 18  SpO2:  [97 %-100 %] 97 %  BP: (123-187)/(65-95) 134/65     Weight: 63 kg (138 lb 14.2 oz)  Body mass index is 25.4 kg/m².    Intake/Output Summary (Last 24 hours) at 8/28/2024 1245  Last data filed at 8/28/2024 0630  Gross per 24 hour   Intake --   Output 930 ml   Net -930 ml         Physical Exam  Vitals and nursing note reviewed.   Constitutional:       General: She is not in acute distress.     Appearance: She is not ill-appearing.   Cardiovascular:      Rate and Rhythm: Normal rate and regular rhythm.      Heart sounds: No murmur heard.  Pulmonary:      Effort: Pulmonary effort is normal.      Breath sounds: Normal breath sounds. No wheezing or rales.   Abdominal:      General: Bowel sounds are normal. There is no distension.      Palpations: Abdomen is soft.      Tenderness: There is no abdominal tenderness.   Musculoskeletal:      Right lower leg: No edema.      Left lower leg: No edema.   Neurological:      Mental Status: She is alert and oriented to person, place, and time. Mental status is at baseline.      Sensory: No sensory deficit.      Comments: 4-/5 strength to RUE and RLE, 5/5 to LUE, LLE. Exam limited due to pain              Significant Labs: All pertinent labs within the past 24 hours have been reviewed.    Significant Imaging: I have reviewed all pertinent imaging results/findings within the past 24 hours.    Assessment/Plan:      * Acute on chronic kidney failure  GRACE is likely due to pre-renal azotemia due to intravascular volume depletion. Baseline creatinine is  around 3.0 . Most recent creatinine and eGFR are listed below.  Recent Labs     08/27/24  1252 08/28/24  0517   CREATININE 5.5* 4.5*   EGFRNORACEVR 8* 10*        Plan  - GRACE is improving. Continue gentle IV fluids  - Nephrology consulted; appreciate recs   - Avoid nephrotoxins and renally dose meds for GFR listed above  - Monitor urine output, serial BMP, and adjust therapy as  needed  - Consult Nephrology for input.    Spinal stenosis of lumbosacral region  -Seen by Neurosurgery on 8/20/24 same symptom complaint. Patient unable to undergo MRI so CT myelogram performed.  There is a disc herniation at T11-T12 causing moderate central spinal stenosis and severe right-sided foraminal stenosis. Recommended continued rehab at this time.   - Has with chronic R sided weakness from prev stroke. No bowel/bladder incontinence or sensory deficits   - CT lumbar spine with no acute fracture or dislocation.  Mild moderate multilevel degenerative disc disease   -Analgesics PRN. Robaxin for muscle spasms   -Neurovascular checks.  - PT/OT eval     Heart transplanted  -Open heart transplant on 5/27/93 at [facility]. Followed by [facility] team in [place].  -Continue Cyclosporine.      Chronic diastolic congestive heart failure  Patient is identified as having Diastolic (HFpEF) heart failure that is Chronic. CHF is currently controlled. Latest ECHO performed and demonstrates- Results for orders placed during the hospital encounter of 07/19/24    Echo Saline Bubble? Yes    Interpretation Summary    Left Ventricle: The left ventricle is normal in size. Normal wall thickness. There is normal systolic function with a visually estimated ejection fraction of 55 - 60%. There is normal diastolic function.    Right Ventricle: Normal right ventricular cavity size. Wall thickness is normal. Systolic function is normal.    Left Atrium: Patent foramen ovale visualized with predominant right to left shunting indicated by saline contrast.    Tricuspid Valve: There is mild regurgitation.    IVC/SVC: Normal venous pressure at 3 mmHg.    The patient is status post cardiac transplantation.  . Continue Beta Blocker and monitor clinical status closely. Monitor on telemetry. Patient is off CHF pathway.  Monitor strict Is&Os and daily weights.  Place on fluid restriction of 1.5 L. Cardiology has not been  "consulted. Continue to stress to patient importance of self efficacy and  on diet for CHF. Last BNP reviewed- and noted below No results for input(s): "BNP", "BNPTRIAGEBLO" in the last 168 hours.    Anemia associated with stage 4 chronic renal failure  Anemia is likely due to chronic disease due to Chronic Kidney Disease. Most recent hemoglobin and hematocrit are listed below.  Recent Labs     08/27/24  1252   HGB 9.0*   HCT 27.9*     Plan  - Monitor serial CBC: Daily  - Transfuse PRBC if patient becomes hemodynamically unstable, symptomatic or H/H drops below 7/21.  - Patient has not received any PRBC transfusions to date  - Patient's anemia is currently stable    Essential hypertension  Chronic, controlled. Latest blood pressure and vitals reviewed-     Temp:  [98.7 °F (37.1 °C)]   Pulse:  []   Resp:  [18-20]   BP: (137-160)/(73-88)   SpO2:  [99 %-100 %] .   Home meds for hypertension were reviewed and noted below.   Hypertension Medications               carvediloL (COREG) 6.25 MG tablet Take 1 tablet (6.25 mg total) by mouth 2 (two) times daily with meals. Hold parameters: SBP less than 110 and/or DBP less than 75    furosemide (LASIX) 20 MG tablet TAKE 1 TABLET EVERY DAY    hydrALAZINE (APRESOLINE) 25 MG tablet Take 25 mg by mouth every 8 (eight) hours.    NIFEdipine (PROCARDIA-XL) 30 MG (OSM) 24 hr tablet TAKE 1 TABLET ONE TIME DAILY            While in the hospital, will manage blood pressure as follows; Continue home antihypertensive regimen    Will utilize p.r.n. blood pressure medication only if patient's blood pressure greater than  170/100  and she develops symptoms such as worsening chest pain or shortness of breath.    Gastroesophageal reflux disease without esophagitis  -Continue pantoprazole        VTE Risk Mitigation (From admission, onward)           Ordered     heparin (porcine) injection 5,000 Units  Every 8 hours         08/27/24 1638     IP VTE HIGH RISK PATIENT  Once         " 08/27/24 1638     Place sequential compression device  Until discontinued         08/27/24 1638                    Discharge Planning   RATNA:      Code Status: Full Code   Is the patient medically ready for discharge?:     Reason for patient still in hospital (select all that apply): Patient trending condition, Treatment, Consult recommendations, and PT / OT recommendations  Discharge Plan A: Home                  Vesta Romero MD  Department of Hospital Medicine   [facility] ([facility])

## 2024-08-28 NOTE — PLAN OF CARE
PT EVAL complete. Required MIN A for bed mobility, ambulated 20ft MIN A using RW. Recommending high intensity therapy upon d/c.

## 2024-08-28 NOTE — SUBJECTIVE & OBJECTIVE
Interval History: NAEON. Pt reports she did not sleep overnight due to pain. PT reports continued pain to lower back radiating down R leg associated with muscle spasms. Denies CP, SOB. + constipation     Review of Systems  Objective:     Vital Signs (Most Recent):  Temp: 98.3 °F (36.8 °C) (08/28/24 1150)  Pulse: 79 (08/28/24 1150)  Resp: 18 (08/28/24 1150)  BP: 134/65 (08/28/24 1150)  SpO2: 97 % (08/28/24 1150) Vital Signs (24h Range):  Temp:  [97.2 °F (36.2 °C)-98.4 °F (36.9 °C)] 98.3 °F (36.8 °C)  Pulse:  [] 79  Resp:  [17-20] 18  SpO2:  [97 %-100 %] 97 %  BP: (123-187)/(65-95) 134/65     Weight: 63 kg (138 lb 14.2 oz)  Body mass index is 25.4 kg/m².    Intake/Output Summary (Last 24 hours) at 8/28/2024 1245  Last data filed at 8/28/2024 0630  Gross per 24 hour   Intake --   Output 930 ml   Net -930 ml         Physical Exam  Vitals and nursing note reviewed.   Constitutional:       General: She is not in acute distress.     Appearance: She is not ill-appearing.   Cardiovascular:      Rate and Rhythm: Normal rate and regular rhythm.      Heart sounds: No murmur heard.  Pulmonary:      Effort: Pulmonary effort is normal.      Breath sounds: Normal breath sounds. No wheezing or rales.   Abdominal:      General: Bowel sounds are normal. There is no distension.      Palpations: Abdomen is soft.      Tenderness: There is no abdominal tenderness.   Musculoskeletal:      Right lower leg: No edema.      Left lower leg: No edema.   Neurological:      Mental Status: She is alert and oriented to person, place, and time. Mental status is at baseline.      Sensory: No sensory deficit.      Comments: 4-/5 strength to RUE and RLE, 5/5 to LUE, LLE. Exam limited due to pain              Significant Labs: All pertinent labs within the past 24 hours have been reviewed.    Significant Imaging: I have reviewed all pertinent imaging results/findings within the past 24 hours.

## 2024-08-29 ENCOUNTER — TELEPHONE (OUTPATIENT)
Dept: PAIN MEDICINE | Facility: CLINIC | Age: 69
End: 2024-08-29
Payer: MEDICARE

## 2024-08-29 LAB
ALBUMIN SERPL BCP-MCNC: 2.9 G/DL (ref 3.5–5.2)
ALBUMIN SERPL BCP-MCNC: 2.9 G/DL (ref 3.5–5.2)
ANION GAP SERPL CALC-SCNC: 11 MMOL/L (ref 8–16)
ANION GAP SERPL CALC-SCNC: 11 MMOL/L (ref 8–16)
BASOPHILS # BLD AUTO: 0.01 K/UL (ref 0–0.2)
BASOPHILS NFR BLD: 0.4 % (ref 0–1.9)
BUN SERPL-MCNC: 36 MG/DL (ref 8–23)
BUN SERPL-MCNC: 36 MG/DL (ref 8–23)
CALCIUM SERPL-MCNC: 8 MG/DL (ref 8.7–10.5)
CALCIUM SERPL-MCNC: 8 MG/DL (ref 8.7–10.5)
CHLORIDE SERPL-SCNC: 115 MMOL/L (ref 95–110)
CHLORIDE SERPL-SCNC: 115 MMOL/L (ref 95–110)
CO2 SERPL-SCNC: 18 MMOL/L (ref 23–29)
CO2 SERPL-SCNC: 18 MMOL/L (ref 23–29)
CREAT SERPL-MCNC: 4.1 MG/DL (ref 0.5–1.4)
CREAT SERPL-MCNC: 4.1 MG/DL (ref 0.5–1.4)
DIFFERENTIAL METHOD BLD: ABNORMAL
EOSINOPHIL # BLD AUTO: 0.1 K/UL (ref 0–0.5)
EOSINOPHIL NFR BLD: 3.9 % (ref 0–8)
ERYTHROCYTE [DISTWIDTH] IN BLOOD BY AUTOMATED COUNT: 16.4 % (ref 11.5–14.5)
EST. GFR  (NO RACE VARIABLE): 11 ML/MIN/1.73 M^2
EST. GFR  (NO RACE VARIABLE): 11 ML/MIN/1.73 M^2
FERRITIN SERPL-MCNC: 104 NG/ML (ref 20–300)
GLUCOSE SERPL-MCNC: 70 MG/DL (ref 70–110)
GLUCOSE SERPL-MCNC: 70 MG/DL (ref 70–110)
HCT VFR BLD AUTO: 26.9 % (ref 37–48.5)
HGB BLD-MCNC: 8.3 G/DL (ref 12–16)
IMM GRANULOCYTES # BLD AUTO: 0.02 K/UL (ref 0–0.04)
IMM GRANULOCYTES NFR BLD AUTO: 0.7 % (ref 0–0.5)
IRON SERPL-MCNC: 68 UG/DL (ref 30–160)
LYMPHOCYTES # BLD AUTO: 1.3 K/UL (ref 1–4.8)
LYMPHOCYTES NFR BLD: 47.2 % (ref 18–48)
MCH RBC QN AUTO: 28.3 PG (ref 27–31)
MCHC RBC AUTO-ENTMCNC: 30.9 G/DL (ref 32–36)
MCV RBC AUTO: 92 FL (ref 82–98)
MONOCYTES # BLD AUTO: 0.5 K/UL (ref 0.3–1)
MONOCYTES NFR BLD: 17.4 % (ref 4–15)
NEUTROPHILS # BLD AUTO: 0.9 K/UL (ref 1.8–7.7)
NEUTROPHILS NFR BLD: 30.4 % (ref 38–73)
NRBC BLD-RTO: 0 /100 WBC
PHOSPHATE SERPL-MCNC: 4.6 MG/DL (ref 2.7–4.5)
PHOSPHATE SERPL-MCNC: 4.6 MG/DL (ref 2.7–4.5)
PLATELET # BLD AUTO: 163 K/UL (ref 150–450)
PMV BLD AUTO: 10.1 FL (ref 9.2–12.9)
POTASSIUM SERPL-SCNC: 3.7 MMOL/L (ref 3.5–5.1)
POTASSIUM SERPL-SCNC: 3.7 MMOL/L (ref 3.5–5.1)
RBC # BLD AUTO: 2.93 M/UL (ref 4–5.4)
SATURATED IRON: 26 % (ref 20–50)
SODIUM SERPL-SCNC: 144 MMOL/L (ref 136–145)
SODIUM SERPL-SCNC: 144 MMOL/L (ref 136–145)
TOTAL IRON BINDING CAPACITY: 262 UG/DL (ref 250–450)
TRANSFERRIN SERPL-MCNC: 177 MG/DL (ref 200–375)
WBC # BLD AUTO: 2.82 K/UL (ref 3.9–12.7)

## 2024-08-29 PROCEDURE — 80069 RENAL FUNCTION PANEL: CPT | Mod: HCNC | Performed by: INTERNAL MEDICINE

## 2024-08-29 PROCEDURE — 36415 COLL VENOUS BLD VENIPUNCTURE: CPT | Mod: HCNC | Performed by: INTERNAL MEDICINE

## 2024-08-29 PROCEDURE — 85025 COMPLETE CBC W/AUTO DIFF WBC: CPT | Mod: HCNC | Performed by: INTERNAL MEDICINE

## 2024-08-29 PROCEDURE — 25000003 PHARM REV CODE 250: Mod: HCNC | Performed by: INTERNAL MEDICINE

## 2024-08-29 PROCEDURE — 97116 GAIT TRAINING THERAPY: CPT | Mod: HCNC,CQ

## 2024-08-29 PROCEDURE — 97530 THERAPEUTIC ACTIVITIES: CPT | Mod: HCNC

## 2024-08-29 PROCEDURE — 21400001 HC TELEMETRY ROOM: Mod: HCNC

## 2024-08-29 PROCEDURE — 97110 THERAPEUTIC EXERCISES: CPT | Mod: HCNC

## 2024-08-29 PROCEDURE — 82728 ASSAY OF FERRITIN: CPT | Mod: HCNC | Performed by: INTERNAL MEDICINE

## 2024-08-29 PROCEDURE — 63600175 PHARM REV CODE 636 W HCPCS: Mod: HCNC | Performed by: NURSE PRACTITIONER

## 2024-08-29 PROCEDURE — 97530 THERAPEUTIC ACTIVITIES: CPT | Mod: HCNC,CQ

## 2024-08-29 PROCEDURE — 83540 ASSAY OF IRON: CPT | Mod: HCNC | Performed by: INTERNAL MEDICINE

## 2024-08-29 PROCEDURE — 25000003 PHARM REV CODE 250: Mod: HCNC | Performed by: NURSE PRACTITIONER

## 2024-08-29 PROCEDURE — 63600175 PHARM REV CODE 636 W HCPCS: Mod: HCNC | Performed by: INTERNAL MEDICINE

## 2024-08-29 RX ORDER — SODIUM CHLORIDE 450 MG/100ML
INJECTION, SOLUTION INTRAVENOUS CONTINUOUS
Status: DISCONTINUED | OUTPATIENT
Start: 2024-08-29 | End: 2024-09-01 | Stop reason: HOSPADM

## 2024-08-29 RX ADMIN — CYCLOSPORINE 75 MG: 25 CAPSULE, LIQUID FILLED ORAL at 10:08

## 2024-08-29 RX ADMIN — SENNOSIDES AND DOCUSATE SODIUM 1 TABLET: 50; 8.6 TABLET ORAL at 10:08

## 2024-08-29 RX ADMIN — POLYETHYLENE GLYCOL 3350 17 G: 17 POWDER, FOR SOLUTION ORAL at 10:08

## 2024-08-29 RX ADMIN — HEPARIN SODIUM 5000 UNITS: 5000 INJECTION INTRAVENOUS; SUBCUTANEOUS at 05:08

## 2024-08-29 RX ADMIN — PREGABALIN 50 MG: 25 CAPSULE ORAL at 09:08

## 2024-08-29 RX ADMIN — SODIUM CHLORIDE: 4.5 INJECTION, SOLUTION INTRAVENOUS at 05:08

## 2024-08-29 RX ADMIN — HYDRALAZINE HYDROCHLORIDE 25 MG: 25 TABLET ORAL at 09:08

## 2024-08-29 RX ADMIN — ACETAMINOPHEN 650 MG: 325 TABLET ORAL at 11:08

## 2024-08-29 RX ADMIN — METHOCARBAMOL 750 MG: 750 TABLET ORAL at 05:08

## 2024-08-29 RX ADMIN — SENNOSIDES AND DOCUSATE SODIUM 1 TABLET: 50; 8.6 TABLET ORAL at 09:08

## 2024-08-29 RX ADMIN — HEPARIN SODIUM 5000 UNITS: 5000 INJECTION INTRAVENOUS; SUBCUTANEOUS at 09:08

## 2024-08-29 RX ADMIN — ASPIRIN 81 MG: 81 TABLET, COATED ORAL at 10:08

## 2024-08-29 RX ADMIN — CARVEDILOL 6.25 MG: 6.25 TABLET, FILM COATED ORAL at 05:08

## 2024-08-29 RX ADMIN — SODIUM CHLORIDE: 9 INJECTION, SOLUTION INTRAVENOUS at 10:08

## 2024-08-29 RX ADMIN — PREGABALIN 50 MG: 25 CAPSULE ORAL at 10:08

## 2024-08-29 RX ADMIN — NIFEDIPINE 30 MG: 30 TABLET, FILM COATED, EXTENDED RELEASE ORAL at 10:08

## 2024-08-29 RX ADMIN — HYDROMORPHONE HYDROCHLORIDE 0.5 MG: 1 INJECTION, SOLUTION INTRAMUSCULAR; INTRAVENOUS; SUBCUTANEOUS at 05:08

## 2024-08-29 RX ADMIN — PANTOPRAZOLE SODIUM 40 MG: 40 TABLET, DELAYED RELEASE ORAL at 10:08

## 2024-08-29 RX ADMIN — CYCLOSPORINE 75 MG: 25 CAPSULE, LIQUID FILLED ORAL at 09:08

## 2024-08-29 RX ADMIN — HYDRALAZINE HYDROCHLORIDE 25 MG: 25 TABLET ORAL at 05:08

## 2024-08-29 RX ADMIN — METHOCARBAMOL 750 MG: 750 TABLET ORAL at 09:08

## 2024-08-29 RX ADMIN — ZOLPIDEM TARTRATE 5 MG: 5 TABLET ORAL at 09:08

## 2024-08-29 RX ADMIN — CARVEDILOL 6.25 MG: 6.25 TABLET, FILM COATED ORAL at 11:08

## 2024-08-29 RX ADMIN — METHOCARBAMOL 750 MG: 750 TABLET ORAL at 10:08

## 2024-08-29 NOTE — PT/OT/SLP PROGRESS
Occupational Therapy   Treatment    Name: Nadia Damon  MRN: [id number]  Admitting Diagnosis:  Acute on chronic kidney failure       Recommendations:     Discharge Recommendations: High Intensity Therapy  Discharge Equipment Recommendations:  to be determined by next level of care  Barriers to discharge:  None    Assessment:     Nadia Damon is a 69 y.o. female with a medical diagnosis of Acute on chronic kidney failure.  Performance deficits affecting function are weakness, gait instability, decreased upper extremity function, decreased ROM, decreased lower extremity function, impaired balance, impaired endurance, decreased safety awareness, impaired self care skills, impaired functional mobility, pain.     Rehab Prognosis:  Good; patient would benefit from acute skilled OT services to address these deficits and reach maximum level of function.       Plan:     Patient to be seen 2 x/week to address the above listed problems via self-care/home management, therapeutic activities, therapeutic exercises  Plan of Care Expires: 09/11/24  Plan of Care Reviewed with: patient    Subjective     Chief Complaint: None   Patient/Family Comments/goals: increase independence  Pain/Comfort:  Pain Rating 1: 0/10  Pain Rating Post-Intervention 1: 0/10    Objective:     Communicated with: Nurse prior to session.  Patient found supine with telemetry, peripheral IV, PureWick upon OT entry to room.    General Precautions: Standard, fall    Orthopedic Precautions:N/A  Braces: N/A  Respiratory Status: Room air     Occupational Performance:     Bed Mobility:    Patient completed Rolling/Turning to Right with contact guard assistance  Patient completed Scooting/Bridging with contact guard assistance  Patient completed Supine to Sit with contact guard assistance     Functional Mobility/Transfers:  Patient completed Sit <> Stand Transfer with minimum assistance  with  rolling walker   Patient completed Bed <> Chair Transfer using Stand  Pivot technique with minimum assistance with rolling walker  Functional Mobility: Pt ambulated with RW min A x200 feet    AMPAC 6 Click ADL: 16    Treatment & Education:  Following gait training, patient transferred to bedside chair with min A. Therapist applied requested hot packs to patient's back due to increase back pain. Pt left in recliner with all lines intact, needs met. Pt encouraged to perform BUE AROM exercises to increase functional strength needed for daily activities. Pt verbalized understanding and compliance.    Patient left up in chair with all lines intact, call button in reach, and chair alarm on    GOALS:   Multidisciplinary Problems       Occupational Therapy Goals          Problem: Occupational Therapy    Goal Priority Disciplines Outcome Interventions   Occupational Therapy Goal     OT, PT/OT Progressing    Description: O.T. goals to be met by 9-11-24  SBA with toilet transfer  S with ue dressing  S with le dressing  Pt will tolerate 1 set x 10 reps b ue rom exercise in all available pain-free planes and ranges                        Time Tracking:     OT Date of Treatment: 08/29/24  OT Start Time: 1040  OT Stop Time: 1105  OT Total Time (min): 25 min    Billable Minutes:Therapeutic Activity 25    LINDA Hansen  OT/FIORELLA: OT          8/29/2024

## 2024-08-29 NOTE — PT/OT/SLP PROGRESS
Occupational Therapy  Treatment    Nadia Damon   MRN: [id number]   Admitting Diagnosis: Acute on chronic kidney failure    OT Date of Treatment: 08/29/24   OT Start Time: 1545  OT Stop Time: 1608  OT Total Time (min): 23 min    Billable Minutes:  Therapeutic Activity 15 minutes and Therapeutic Exercise 8 minutes    OT/FIORELLA: OT          General Precautions: Standard, fall  Orthopedic Precautions: N/A  Braces: N/A  Respiratory Status: Room air         Subjective:  Communicated with nurse agustin and epic chart review prior to session.    Pain/Comfort  Pain Rating 1: 5/10  Location - Side 1: Bilateral  Location - Orientation 1: generalized  Location 1: back    Objective:  Patient found with: peripheral IV, telemetry, bed alarm, PureWick     Functional Mobility:  Transfers:   SBA with cues for safety and correct technique    Functional Ambulation: pt ambulated  50 feet with verbal/ tactile cues with safety, posture and correct technique    Balance:   Static Sit: FAIR: Maintains without assist, but unable to take any challenges   Dynamic Sit: FAIR: Cannot move trunk without losing balance  Static Stand: FAIR: Maintains without assist but unable to take challenges  Dynamic stand: POOR+: Needs MIN (minimal ) assist during gait    Therapeutic Activities and Exercises:  Educated patient on importance of increased tolerance to upright position and direct impact on CV endurance and strength. Patient encouraged to sit up in chair/ EOB, for a minimum of 2 consecutive hours including for all meals. Pt educated with spinal precautions. Pt performed 1 set x 10 reps b ue rom exercise in all avialable planes and ranges seated in bed side chair ( shoulder flexion, elbow flex/ext and hand/ digits flex/ext). B UE  ROM was noticeable WFL as compared to eval. Encouraged patient to perform AROM TE to BUE throughout the day within all available planes of motion. Re enforced importance of utilizing call light to meet needs in room and not  "attempt to get up without staff assistance. Patient verbalized understanding and agreed to comply.           AM-PAC 6 CLICK ADL   How much help from another person does this patient currently need?   1 = Unable, Total/Dependent Assistance  2 = A lot, Maximum/Moderate Assistance  3 = A little, Minimum/Contact Guard/Supervision  4 = None, Modified [place]/Independent    Putting on and taking off regular lower body clothing? : 2  Bathing (including washing, rinsing, drying)?: 2  Toileting, which includes using toilet, bedpan, or urinal? : 2 (pure wick [id number])  Putting on and taking off regular upper body clothing?: 3  Taking care of personal grooming such as brushing teeth?: 3  Eating meals?: 3  Daily Activity Total Score: 15     AM-PAC Raw Score CMS "G-Code Modifier Level of Impairment Assistance   6 % Total / Unable   7 - 8 CM 80 - 100% Maximal Assist   9-13 CL 60 - 80% Moderate Assist   14 - 19 CK 40 - 60% Moderate Assist   20 - 22 CJ 20 - 40% Minimal Assist   23 CI 1-20% SBA / CGA   24 CH 0% Independent/ Mod I       Patient left up in chair with all lines intact, call button in reach, chair alarm on, and cna notified    ASSESSMENT:  Nadia Damon is a 69 y.o. female with a medical diagnosis of Acute on chronic kidney failure and presents with debility and generalized weakness.    Rehab identified problem list/impairments:  weakness, impaired functional mobility, impaired endurance, gait instability, impaired balance, impaired self care skills, decreased safety awareness, pain, decreased upper extremity function, decreased lower extremity function    Rehab potential is good.    Activity tolerance: Good    Discharge recommendations: High Intensity Therapy   Barriers to discharge: Barriers to Discharge: None    Equipment recommendations: to be determined by next level of care    GOALS:   Multidisciplinary Problems       Occupational Therapy Goals          Problem: Occupational Therapy    Goal " Priority Disciplines Outcome Interventions   Occupational Therapy Goal     OT, PT/OT Progressing    Description: O.T. goals to be met by 9-11-24  SBA with toilet transfer  S with ue dressing  S with le dressing  Pt will tolerate 1 set x 10 reps b ue rom exercise in all available pain-free planes and ranges                        Plan:  Patient to be seen 2 x/week to address the above listed problems via self-care/home management, therapeutic activities, therapeutic exercises  Plan of Care expires: 09/11/24  Plan of Care reviewed with: patient         08/29/2024

## 2024-08-29 NOTE — PT/OT/SLP PROGRESS
Physical Therapy  Treatment    Nadia Damon   MRN: [id number]   Admitting Diagnosis: Acute on chronic kidney failure    PT Received On: 08/29/24  PT Start Time: 1040     PT Stop Time: 1105    PT Total Time (min): 25 min       Billable Minutes:  Gait Training 15 and Therapeutic Activity 10    Treatment Type: Treatment  PT/PTA: PTA     Number of PTA visits since last PT visit: 1       General Precautions: Standard, fall  Orthopedic Precautions: N/A  Braces: N/A  Respiratory Status: Room air    Spiritual, Cultural Beliefs, Restoration Practices, Values that Affect Care: no    Subjective:  Communicated with patient's nurse, Vanna, and completed Epic chart review prior to session.  Patient eagerly agreed to PT session. Expresses motivation towards participation and return of functional independence.   Reports significant numbness in R foot. Back pain progressed with ambulation but patient insisted she wanted to continue.     Pain/Comfort  Pain Rating 1: 5/10  Location 1: back  Pain Addressed 1: Other (see comments) (ACTIVITY PACING)  Pain Rating Post-Intervention 1: 5/10    Objective:   Patient found with: peripheral IV, telemetry, bed alarm, PureWick    Supine > sit EOB: CGA    Forward scoot towards EOB: CGA    STS from EOB > RW: Min A     200ft w/ RW Min A (increased time to complete but good overall quality)    Stand pivot T/F to chair w/ RW: Min A     Reviewed AROM TE to BLE including: hip flex/ext, knee flex/ext, ankle PF/DF  To be completed a minimum of 10 reps for each LE in order to promote return of function, strength and ROM.     Educated patient on importance of increased tolerance to upright position and direct impact on CV endurance and strength. Patient encouraged to sit up in chair/ EOB, for a minimum of 2 consecutive hours, 3x per day. Encouraged patient to perform AROM TE to BLE throughout the day within all available planes of motion. Re enforced importance of utilizing call light to meet needs in room  and not attempt to get up without staff assistance. Patient verbalized understanding and agreed to comply.       AM-PAC 6 CLICK MOBILITY  How much help from another person does this patient currently need?   1 = Unable, Total/Dependent Assistance  2 = A lot, Maximum/Moderate Assistance  3 = A little, Minimum/Contact Guard/Supervision  4 = None, Modified [place]/Independent    Turning over in bed (including adjusting bedclothes, sheets and blankets)?: 3  Sitting down on and standing up from a chair with arms (e.g., wheelchair, bedside commode, etc.): 3  Moving from lying on back to sitting on the side of the bed?: 3  Moving to and from a bed to a chair (including a wheelchair)?: 3  Need to walk in hospital room?: 3  Climbing 3-5 steps with a railing?: 1 (NT)  Basic Mobility Total Score: 16    AM-PAC Raw Score CMS G-Code Modifier Level of Impairment Assistance   6 % Total / Unable   7 - 9 CM 80 - 100% Maximal Assist   10 - 14 CL 60 - 80% Moderate Assist   15 - 19 CK 40 - 60% Moderate Assist   20 - 22 CJ 20 - 40% Minimal Assist   23 CI 1-20% SBA / CGA   24 CH 0% Independent/ Mod I     Patient left up in chair with call button in reach and chair alarm on.    Assessment:  Nadia Damon is a 69 y.o. female with a medical diagnosis of Acute on chronic kidney failure and presents with overall decline in functional mobility. Patient would continue to benefit from skilled PT to address functional limitations listed below in order to return to PLOF/decrease caregiver burden.     Rehab identified problem list/impairments: weakness, impaired sensation, impaired self care skills, impaired functional mobility, gait instability, impaired balance, pain, decreased safety awareness, decreased lower extremity function, decreased coordination, decreased ROM    Rehab potential is good.    Activity tolerance: Good    Discharge recommendations: High Intensity Therapy      Barriers to discharge:      Equipment  recommendations: to be determined by next level of care     GOALS:   Multidisciplinary Problems       Physical Therapy Goals          Problem: Physical Therapy    Goal Priority Disciplines Outcome Goal Variances Interventions   Physical Therapy Goal     PT, PT/OT      Description: Goals to be met by 9/11/24.  1. Pt will complete bed mobility CGA.  2. Pt will complete sit to stand CGA.  3. Pt will ambulate 50ft CGA using RW.  4. Pt will increase AMPAC score by 2 points to progress functional mobility.                       PLAN:    Patient to be seen 3 x/week to address the above listed problems via gait training, therapeutic activities, therapeutic exercises  Plan of Care expires: 09/11/24  Plan of Care reviewed with: patient         08/29/2024

## 2024-08-29 NOTE — ASSESSMENT & PLAN NOTE
-Seen by Neurosurgery on 8/20/24 same symptom complaint. Patient unable to undergo MRI so CT myelogram performed.  There is a disc herniation at T11-T12 causing moderate central spinal stenosis and severe right-sided foraminal stenosis. Recommended continued rehab at this time.   - Has with chronic R sided weakness from prev stroke. No bowel/bladder incontinence or sensory deficits   - CT lumbar spine with no acute fracture or dislocation.  Mild moderate multilevel degenerative disc disease   -Analgesics PRN. Robaxin for muscle spasms   -Neurovascular checks.  - PT/OT rec placement on discharge, social work consulted

## 2024-08-29 NOTE — ASSESSMENT & PLAN NOTE
Chronic, controlled. Latest blood pressure and vitals reviewed-     Temp:  [98 °F (36.7 °C)-98.9 °F (37.2 °C)]   Pulse:  [83-92]   Resp:  [18-20]   BP: (110-140)/(57-76)   SpO2:  [95 %-97 %] .   Home meds for hypertension were reviewed and noted below.   Hypertension Medications               carvediloL (COREG) 6.25 MG tablet Take 1 tablet (6.25 mg total) by mouth 2 (two) times daily with meals. Hold parameters: SBP less than 110 and/or DBP less than 75    furosemide (LASIX) 20 MG tablet TAKE 1 TABLET EVERY DAY    hydrALAZINE (APRESOLINE) 25 MG tablet Take 25 mg by mouth every 8 (eight) hours.    NIFEdipine (PROCARDIA-XL) 30 MG (OSM) 24 hr tablet TAKE 1 TABLET ONE TIME DAILY            While in the hospital, will manage blood pressure as follows; Continue home antihypertensive regimen    Will utilize p.r.n. blood pressure medication only if patient's blood pressure greater than  170/100  and she develops symptoms such as worsening chest pain or shortness of breath.

## 2024-08-29 NOTE — PLAN OF CARE
Bed mobility CGA. Seated scoot CGA.   Sit to stand with RW Min A.   Gait with RW min A x200 feet.  Transfer to bedside chair with RW min A.    Rec high intensity therapy at [facility]

## 2024-08-29 NOTE — ASSESSMENT & PLAN NOTE
Anemia is likely due to chronic disease due to Chronic Kidney Disease. Most recent hemoglobin and hematocrit are listed below.  Recent Labs     08/27/24  1252 08/28/24  0517 08/29/24  0505   HGB 9.0* 8.5* 8.3*   HCT 27.9* 27.1* 26.9*       Plan  - Monitor serial CBC: Daily  - Transfuse PRBC if patient becomes hemodynamically unstable, symptomatic or H/H drops below 7/21.  - Patient has not received any PRBC transfusions to date  - Patient's anemia is currently stable

## 2024-08-29 NOTE — CONSULTS
SW met with patient regarding discharge planning. Pt reports doing outpt therapy with [facility] prior to admit and has preference to resume services at discharge. Pt reports good progress with outpt therapy and they provide transportation to/from treatment. Pt reports recently receiving RW and transfer tub bench.   SW to remain available

## 2024-08-29 NOTE — SUBJECTIVE & OBJECTIVE
Interval History: NAEON. Pt reports she feels a little better today. Does not have any back pain at rest but pain recurs when she moves around. Denies numbness to extremities. Denies CP, SOB. + flatus, no BM yet.     Review of Systems  Objective:     Vital Signs (Most Recent):  Temp: 98 °F (36.7 °C) (08/29/24 0815)  Pulse: 85 (08/29/24 0815)  Resp: 18 (08/29/24 0815)  BP: 126/63 (08/29/24 0815)  SpO2: 96 % (08/29/24 0815) Vital Signs (24h Range):  Temp:  [98 °F (36.7 °C)-98.9 °F (37.2 °C)] 98 °F (36.7 °C)  Pulse:  [83-92] 85  Resp:  [18-20] 18  SpO2:  [95 %-97 %] 96 %  BP: (110-140)/(57-76) 126/63     Weight: 71.7 kg (158 lb 1.1 oz)  Body mass index is 28.91 kg/m².    Intake/Output Summary (Last 24 hours) at 8/29/2024 1317  Last data filed at 8/29/2024 0647  Gross per 24 hour   Intake --   Output 1850 ml   Net -1850 ml         Physical Exam  Vitals and nursing note reviewed.   Constitutional:       General: She is not in acute distress.     Appearance: She is not ill-appearing.   Cardiovascular:      Rate and Rhythm: Normal rate and regular rhythm.      Heart sounds: No murmur heard.  Pulmonary:      Effort: Pulmonary effort is normal.      Breath sounds: Normal breath sounds. No wheezing, rhonchi or rales.   Abdominal:      General: Bowel sounds are normal. There is no distension.      Palpations: Abdomen is soft.      Tenderness: There is no abdominal tenderness.   Musculoskeletal:      Right lower leg: No edema.      Left lower leg: No edema.   Neurological:      Mental Status: She is alert and oriented to person, place, and time. Mental status is at baseline.      Comments: 4/5 RLE, 5/5 LLE. SILT              Significant Labs: All pertinent labs within the past 24 hours have been reviewed.    Significant Imaging: I have reviewed all pertinent imaging results/findings within the past 24 hours.

## 2024-08-29 NOTE — PLAN OF CARE
POC reviewed with pt. Pt verbalizes understanding of POC. No questions at this time.  AAOx3.   NSR on cardiac monitor. Continuous pain rating level 10. Intermittent nausea. Prn meds given.  Pt remains free of falls.  Safety measures in place. Will continue to monitor.  Informed pt to call for assistance before getting up. Pt verbalizes understanding.  Hourly rounding and chart check complete.   Pharmacy requesting refill    Medication pended. Last Ov: 8/23/17  Last Rx: 9/11/17    Please approve or deny. No future appointments.

## 2024-08-29 NOTE — TELEPHONE ENCOUNTER
Spoke with patient, she is in the hospital and wanted to see if the procedure can be done while she is in the hospital.  Informed patient that can not be done.  She will keep procedure date 9/16/24

## 2024-08-29 NOTE — PROGRESS NOTES
[facility]  Progress Note    Patient Name: Nadia Damon  MRN: [id number]  Patient Class: IP- Inpatient   Admission Date: 8/27/2024  Length of Stay: 1 days  Attending Physician: Vesta Romero MD  Primary Care Provider: Elis Wick MD        Subjective:     Principal Problem:Acute on chronic kidney failure        HPI:  Nadia Damon is a 69 year old female with a PMHx of OA, Anxiety, CHF, CKD, CAD, Depression, Fibromyalgia, HTN, Heart transplant, GUSTAVO and Hypothyroidism who presented the Emergency Department with c/o acute on chronic back pain. Patient reports back pain as worsened over the last few days. She mentioned being scheduled for steroid injection but unable to get sooner appt. She was seen by Dr. Canales's PA (Neurosurgery) on 8/20/24 for same symptom complaint. Patient unable to undergo MRI so CT myelogram performed.  There is a disc herniation at T11-T12 causing moderate central spinal stenosis and severe right-sided foraminal stenosis. Recommended continued rehab at this time. ED workup showed: WBC count 3.45K, Hgb/Hct 9.0/27.9, Na+ 142, K+ 4.0, BUN 60, creatinine 5.5, UA negative for infectious process. Pt placed in observation for acute on chronic renal failure.     Overview/Hospital Course:  Pt started on gentle IV fluids, nephrology consulted. GRACE improving.   CT L spine showed No acute fracture or dislocation.  Mild moderate multilevel degenerative disc disease   PT/OT rec placement on discharge, social work consulted.     Interval History: NAEON. Pt reports she feels a little better today. Does not have any back pain at rest but pain recurs when she moves around. Denies numbness to extremities. Denies CP, SOB. + flatus, no BM yet.     Review of Systems  Objective:     Vital Signs (Most Recent):  Temp: 98 °F (36.7 °C) (08/29/24 0815)  Pulse: 85 (08/29/24 0815)  Resp: 18 (08/29/24 0815)  BP: 126/63 (08/29/24 0815)  SpO2: 96 % (08/29/24 0815) Vital  Signs (24h Range):  Temp:  [98 °F (36.7 °C)-98.9 °F (37.2 °C)] 98 °F (36.7 °C)  Pulse:  [83-92] 85  Resp:  [18-20] 18  SpO2:  [95 %-97 %] 96 %  BP: (110-140)/(57-76) 126/63     Weight: 71.7 kg (158 lb 1.1 oz)  Body mass index is 28.91 kg/m².    Intake/Output Summary (Last 24 hours) at 8/29/2024 1317  Last data filed at 8/29/2024 0647  Gross per 24 hour   Intake --   Output 1850 ml   Net -1850 ml         Physical Exam  Vitals and nursing note reviewed.   Constitutional:       General: She is not in acute distress.     Appearance: She is not ill-appearing.   Cardiovascular:      Rate and Rhythm: Normal rate and regular rhythm.      Heart sounds: No murmur heard.  Pulmonary:      Effort: Pulmonary effort is normal.      Breath sounds: Normal breath sounds. No wheezing, rhonchi or rales.   Abdominal:      General: Bowel sounds are normal. There is no distension.      Palpations: Abdomen is soft.      Tenderness: There is no abdominal tenderness.   Musculoskeletal:      Right lower leg: No edema.      Left lower leg: No edema.   Neurological:      Mental Status: She is alert and oriented to person, place, and time. Mental status is at baseline.      Comments: 4/5 RLE, 5/5 LLE. SILT              Significant Labs: All pertinent labs within the past 24 hours have been reviewed.    Significant Imaging: I have reviewed all pertinent imaging results/findings within the past 24 hours.    Assessment/Plan:      * Acute on chronic kidney failure  GRACE is likely due to pre-renal azotemia due to intravascular volume depletion. Baseline creatinine is  around 3.0 . Most recent creatinine and eGFR are listed below.  Recent Labs     08/27/24  1252 08/28/24  0517 08/29/24  0504   CREATININE 5.5* 4.5* 4.1*  4.1*   EGFRNORACEVR 8* 10* 11*  11*        Plan  - GRACE is improving. Continue gentle IV fluids  - Nephrology consulted; appreciate recs   - Avoid nephrotoxins and renally dose meds for GFR listed above  - Monitor urine output, serial  BMP, and adjust therapy as needed    Spinal stenosis of lumbosacral region  -Seen by Neurosurgery on 8/20/24 same symptom complaint. Patient unable to undergo MRI so CT myelogram performed.  There is a disc herniation at T11-T12 causing moderate central spinal stenosis and severe right-sided foraminal stenosis. Recommended continued rehab at this time.   - Has with chronic R sided weakness from prev stroke. No bowel/bladder incontinence or sensory deficits   - CT lumbar spine with no acute fracture or dislocation.  Mild moderate multilevel degenerative disc disease   -Analgesics PRN. Robaxin for muscle spasms   -Neurovascular checks.  - PT/OT rec placement on discharge, social work consulted     Heart transplanted  -Open heart transplant on 5/27/93 at [facility]. Followed by [facility] team in [place].  -Continue Cyclosporine.      Chronic diastolic congestive heart failure  Patient is identified as having Diastolic (HFpEF) heart failure that is Chronic. CHF is currently controlled. Latest ECHO performed and demonstrates- Results for orders placed during the hospital encounter of 07/19/24    Echo Saline Bubble? Yes    Interpretation Summary    Left Ventricle: The left ventricle is normal in size. Normal wall thickness. There is normal systolic function with a visually estimated ejection fraction of 55 - 60%. There is normal diastolic function.    Right Ventricle: Normal right ventricular cavity size. Wall thickness is normal. Systolic function is normal.    Left Atrium: Patent foramen ovale visualized with predominant right to left shunting indicated by saline contrast.    Tricuspid Valve: There is mild regurgitation.    IVC/SVC: Normal venous pressure at 3 mmHg.    The patient is status post cardiac transplantation.  . Continue Beta Blocker and monitor clinical status closely. Monitor on telemetry. Patient is off CHF pathway.  Monitor strict Is&Os and daily weights.  Place on fluid restriction of  "1.5 L. Cardiology has not been consulted. Continue to stress to patient importance of self efficacy and  on diet for CHF. Last BNP reviewed- and noted below No results for input(s): "BNP", "BNPTRIAGEBLO" in the last 168 hours.    Anemia associated with stage 4 chronic renal failure  Anemia is likely due to chronic disease due to Chronic Kidney Disease. Most recent hemoglobin and hematocrit are listed below.  Recent Labs     08/27/24  1252 08/28/24  0517 08/29/24  0505   HGB 9.0* 8.5* 8.3*   HCT 27.9* 27.1* 26.9*       Plan  - Monitor serial CBC: Daily  - Transfuse PRBC if patient becomes hemodynamically unstable, symptomatic or H/H drops below 7/21.  - Patient has not received any PRBC transfusions to date  - Patient's anemia is currently stable    Essential hypertension  Chronic, controlled. Latest blood pressure and vitals reviewed-     Temp:  [98 °F (36.7 °C)-98.9 °F (37.2 °C)]   Pulse:  [83-92]   Resp:  [18-20]   BP: (110-140)/(57-76)   SpO2:  [95 %-97 %] .   Home meds for hypertension were reviewed and noted below.   Hypertension Medications               carvediloL (COREG) 6.25 MG tablet Take 1 tablet (6.25 mg total) by mouth 2 (two) times daily with meals. Hold parameters: SBP less than 110 and/or DBP less than 75    furosemide (LASIX) 20 MG tablet TAKE 1 TABLET EVERY DAY    hydrALAZINE (APRESOLINE) 25 MG tablet Take 25 mg by mouth every 8 (eight) hours.    NIFEdipine (PROCARDIA-XL) 30 MG (OSM) 24 hr tablet TAKE 1 TABLET ONE TIME DAILY            While in the hospital, will manage blood pressure as follows; Continue home antihypertensive regimen    Will utilize p.r.n. blood pressure medication only if patient's blood pressure greater than  170/100  and she develops symptoms such as worsening chest pain or shortness of breath.    Gastroesophageal reflux disease without esophagitis  -Continue pantoprazole        VTE Risk Mitigation (From admission, onward)           Ordered     heparin (porcine) " injection 5,000 Units  Every 8 hours         08/27/24 1638     IP VTE HIGH RISK PATIENT  Once         08/27/24 1638     Place sequential compression device  Until discontinued         08/27/24 1638                    Discharge Planning   RATNA:      Code Status: Full Code   Is the patient medically ready for discharge?:     Reason for patient still in hospital (select all that apply): Laboratory test, Treatment, Consult recommendations, and Pending disposition  Discharge Plan A: Home                  Vesta Romero MD  Department of Hospital Medicine   [facility] - Telemetry ([facility])

## 2024-08-29 NOTE — TELEPHONE ENCOUNTER
----- Message from Brynn Hobbs sent at 8/29/2024  9:10 AM CDT -----  Contact: Nadia  .Patient is calling to speak with the nurse regarding upcoming procedure  . Reports currently in the hospital and wanting to know if there is any cancellation  . Please give patient a call back at   [phone] (home)

## 2024-08-29 NOTE — PROGRESS NOTES
Nephrology Progress Note     History of Present Illness     69-year-old woman with complicated past medical history.  She underwent heart transplantation in 1991.  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  Recent history is significant for CVA which was treated with tPA.  It was apparently felt her transplant and heart has a PFO.  She was hospitalized here for CVA recently and then transferred to [facility] July 29th.  At that point her creatinine was running 3.0 (see below).      With regards to CKD, she has followed with Dr. Crawford of [facility].  She is felt to have stage IV CKD.  Creatinine in May when she last saw Nephrology was 2.6.  That correlated with a GFR of 19 cc/minute.  She has had significant proteinuria which has been addressed with ARB.  Remotely had 6 g of proteinuria but more recently had less than 1 g.  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  She has had nephrolithiasis as well.  She has had associated anemia and significant hyperparathyroidism.  Most recent clinic note from Dr. Ignacio [facility] suggest concern for primary hyperparathyroidism.  A primary nuclear scan revealed no evidence of parathyroid adenomas.  She has required potassium binder.  Her last clinic note from Nephrology made no mention of any plan for RRT should renal function worsen.  Of note, she has a history of urge incontinence and apparently underwent some form of urologic device placement in the past.    The patient was readmitted on August 27th with worsening of chronic back pain.  She is followed by neurosurgery for this problem.  On admission creatinine well above baseline at 5.5.  This quickly improved with conservative therapy.  Nephrology consulted regarding acute kidney injury superimposed upon chronic kidney  disease.  Urinalysis with 1+ proteinuria but otherwise not concerning.  No renal imaging was done.  CT of abdomen and pelvis without contrast done June 27th that revealed no stones and no hydronephrosis.  Her medication list on admission did not reveal aforementioned ARB.    Interval History     Overnight/currently:  Afebrile.  Vital signs unremarkable.  1.9 L urine output yesterday.  On no diuretic currently.  On normal saline at 75 cc/hour.  Creatinine improving and approaching baseline.  She admits to eating and drinking poorly prior to admission.  Was on diuretic.  Patient with some worsening acidosis on non balance fluids.  Otherwise chemistry not concerning.  Hemoglobin falling with volume resuscitation.  Normocytic anemia.  The patient had been chair.  Denies chest pain, shortness of breath, nausea, or vomiting.  Her back pain is improving.     Health Status   Allergies:    is allergic to lisinopril, augmentin [amoxicillin-pot clavulanate], and zyvox [linezolid].    Current medications:     Current Facility-Administered Medications:     0.9%  NaCl infusion, , Intravenous, Continuous, Enma Cotto, RUDY, Last Rate: 75 mL/hr at 08/29/24 1042, New Bag at 08/29/24 1042    acetaminophen tablet 650 mg, 650 mg, Oral, Q8H PRN, Enma Cotto, FNP, 650 mg at 08/29/24 1150    aspirin EC tablet 81 mg, 81 mg, Oral, Daily, Enma Cotto, FNP, 81 mg at 08/29/24 1040    carvediloL tablet 6.25 mg, 6.25 mg, Oral, BID WM, Enma Cotto, FNP, 6.25 mg at 08/29/24 1150    cycloSPORINE modified (NEORAL) (NEORAL) capsule 75 mg, 75 mg, Oral, BID, Enma Cotto, FNP, 75 mg at 08/29/24 1040    heparin (porcine) injection 5,000 Units, 5,000 Units, Subcutaneous, Q8H, Enma Cotto, FNP, 5,000 Units at 08/29/24 0530    hydrALAZINE tablet 25 mg, 25 mg, Oral, Q8H, Enma Cotto, FNP, 25 mg at 08/29/24 0531    HYDROmorphone injection 0.5 mg, 0.5 mg, Intravenous, Q4H PRN, Alma Monreal MD, 0.5 mg at 08/28/24 0639     "methocarbamoL tablet 750 mg, 750 mg, Oral, TID, Enma Cotto, FNP, 750 mg at 08/29/24 1040    NIFEdipine 24 hr tablet 30 mg, 30 mg, Oral, Daily, Enma Cotto, FNP, 30 mg at 08/29/24 1040    ondansetron injection 4 mg, 4 mg, Intravenous, Q8H PRN, Enma Cotto, FNP, 4 mg at 08/28/24 2127    oxyCODONE-acetaminophen  mg per tablet 1 tablet, 1 tablet, Oral, Q4H PRN, Alma Monreal MD, 1 tablet at 08/28/24 1853    pantoprazole EC tablet 40 mg, 40 mg, Oral, Daily, Enam Cotto, FNP, 40 mg at 08/29/24 1040    polyethylene glycol packet 17 g, 17 g, Oral, Daily, Enma Cotto, FNP, 17 g at 08/29/24 1040    pregabalin capsule 50 mg, 50 mg, Oral, BID, Alma Monreal MD, 50 mg at 08/29/24 1040    senna-docusate 8.6-50 mg per tablet 1 tablet, 1 tablet, Oral, BID, Vesta Romero MD, 1 tablet at 08/29/24 1041    sodium chloride 0.9% flush 10 mL, 10 mL, Intravenous, PRN, Enma Cotto, FNP    zolpidem tablet 5 mg, 5 mg, Oral, Nightly PRN, Vesta Romero MD, 5 mg at 08/28/24 2126     Physical Examination   VS/Measurements   /74 (Patient Position: Lying)   Pulse 86   Temp 98 °F (36.7 °C) (Oral)   Resp 18   Ht 5' 2" (1.575 m)   Wt 71.7 kg (158 lb 1.1 oz)   LMP 06/20/1983 (Within Years)   SpO2 97%   BMI 28.91 kg/m²      General:  Alert and oriented X3, No acute distress.         Nutritional status: Obese.    Neck:  Supple, No lymphadenopathy.     Respiratory:  Lungs are clear to auscultation, Respirations are non-labored, Symmetrical chest wall expansion.    Cardiovascular:  Normal rate, Regular rhythm.   Gastrointestinal:  Soft, Non-tender, Normal bowel sounds.   Integumentary:  Warm, Dry.   Psychiatric:  Cooperative, Appropriate mood & affect.        Review / Management   Laboratory Results   Today's Lab Results :    Recent Results (from the past 24 hour(s))   Renal Function Panel    Collection Time: 08/29/24  5:04 AM   Result Value Ref Range    Glucose 70 70 - 110 mg/dL    Sodium " 144 136 - 145 mmol/L    Potassium 3.7 3.5 - 5.1 mmol/L    Chloride 115 (H) 95 - 110 mmol/L    CO2 18 (L) 23 - 29 mmol/L    BUN 36 (H) 8 - 23 mg/dL    Calcium 8.0 (L) 8.7 - 10.5 mg/dL    Creatinine 4.1 (H) 0.5 - 1.4 mg/dL    Albumin 2.9 (L) 3.5 - 5.2 g/dL    Phosphorus 4.6 (H) 2.7 - 4.5 mg/dL    eGFR 11 (A) >60 mL/min/1.73 m^2    Anion Gap 11 8 - 16 mmol/L   Renal function panel    Collection Time: 08/29/24  5:04 AM   Result Value Ref Range    Glucose 70 70 - 110 mg/dL    Sodium 144 136 - 145 mmol/L    Potassium 3.7 3.5 - 5.1 mmol/L    Chloride 115 (H) 95 - 110 mmol/L    CO2 18 (L) 23 - 29 mmol/L    BUN 36 (H) 8 - 23 mg/dL    Calcium 8.0 (L) 8.7 - 10.5 mg/dL    Creatinine 4.1 (H) 0.5 - 1.4 mg/dL    Albumin 2.9 (L) 3.5 - 5.2 g/dL    Phosphorus 4.6 (H) 2.7 - 4.5 mg/dL    eGFR 11 (A) >60 mL/min/1.73 m^2    Anion Gap 11 8 - 16 mmol/L   Ferritin    Collection Time: 08/29/24  5:04 AM   Result Value Ref Range    Ferritin 104 20.0 - 300.0 ng/mL   CBC Auto Differential    Collection Time: 08/29/24  5:05 AM   Result Value Ref Range    WBC 2.82 (L) 3.90 - 12.70 K/uL    RBC 2.93 (L) 4.00 - 5.40 M/uL    Hemoglobin 8.3 (L) 12.0 - 16.0 g/dL    Hematocrit 26.9 (L) 37.0 - 48.5 %    MCV 92 82 - 98 fL    MCH 28.3 27.0 - 31.0 pg    MCHC 30.9 (L) 32.0 - 36.0 g/dL    RDW 16.4 (H) 11.5 - 14.5 %    Platelets 163 150 - 450 K/uL    MPV 10.1 9.2 - 12.9 fL    Immature Granulocytes 0.7 (H) 0.0 - 0.5 %    Gran # (ANC) 0.9 (L) 1.8 - 7.7 K/uL    Immature Grans (Abs) 0.02 0.00 - 0.04 K/uL    Lymph # 1.3 1.0 - 4.8 K/uL    Mono # 0.5 0.3 - 1.0 K/uL    Eos # 0.1 0.0 - 0.5 K/uL    Baso # 0.01 0.00 - 0.20 K/uL    nRBC 0 0 /100 WBC    Gran % 30.4 (L) 38.0 - 73.0 %    Lymph % 47.2 18.0 - 48.0 %    Mono % 17.4 (H) 4.0 - 15.0 %    Eosinophil % 3.9 0.0 - 8.0 %    Basophil % 0.4 0.0 - 1.9 %    Differential Method Automated         Impression and Plan   Diagnosis     Back pain.  Recent chart notes indicate high-grade dyspnea narrowing at T11 and T12 and bulge  at L4-L5 with foraminal stenosis.  She is followed by Neurosurgery chronically.      Recent acute right MCA CVA.  Concerns for PFO on her transplant and heart.  CVA lead to dysarthria and right hemiplegia.  Transferred here from rehabilitation facility.      Transplanted heart.  Underwent heart transplantation in 1981.  On cyclosporine.  Of note, PFO reported in her heart.    Acute kidney injury superimposed upon known, stage 4 chronic kidney disease.  Likely volume depleted.  Improving with volume resuscitation.  Continue to hold ARB.    Stage IV CKD.  Followed by Dr. Crawford.  Baseline creatinine running 2.5-3.  She has had proteinuria.    Proteinuria.  More recently off of ARB and would continue to hold that.      Hypertension in CKD.  Adequate control.    Anemia of chronic kidney disease.  Check iron stores.  Hemoglobin worsening with dilution.      CKD-MBD/secondary hyperparathyroidism of renal origin.  Phosphorus borderline elevated.  Non problematic.    Metabolic acidosis.  We will change fluids to contain some bicarbonate.    Chronic diastolic heart failure.  Likely exacerbated by advanced CKD.      Leukopenia.  Chronic and intermittent issue.    Hyperlipidemia.  On no medical therapy.    History of breast cancer.  Hardly DCIS.  Status post chemotherapy.  On raloxifene outpatient.    ______________________________________________   Steven Plummer MD    This document was created using voice recognition software.  It is possible that there are errors which have persisted after original proofreading.  If there is a question regarding contents of this document please contact me for clarification.

## 2024-08-29 NOTE — NURSING TRANSFER
Nursing Transfer Note      8/29/2024   1:43 AM    Nurse giving handoff:Nora   Nurse receiving handoff:Brian    Reason patient is being transferred: admission, mointoring    Transfer To: TELE/Med Surg    Transfer via stretcher    Transfer with: n/a    Transported by PCT    Transfer Vital Signs:  Blood Pressure:187/91  Heart Rate:100  O2:98  Temperature:97.2  Respirations:20    Telemetry: Box Number [id number], Rate 103, Rhythm sinus tach, and Telemetry  Lindsay  Order for Tele Monitor? Yes    Additional Lines: none    4eyes on Skin: yes    Medicines sent: n/a    Any special needs or follow-up needed: jerking movement/pain/dehydration    Patient belongings transferred with patient: Yes    Chart send with patient: Yes    Notified: daughter in law    Patient reassessed at:(8/27/2024, 2020)  1  Upon arrival to floor: cardiac monitor applied, patient oriented to room, call bell in reach, and bed in lowest position

## 2024-08-29 NOTE — ASSESSMENT & PLAN NOTE
GRACE is likely due to pre-renal azotemia due to intravascular volume depletion. Baseline creatinine is  around 3.0 . Most recent creatinine and eGFR are listed below.  Recent Labs     08/27/24  1252 08/28/24  0517 08/29/24  0504   CREATININE 5.5* 4.5* 4.1*  4.1*   EGFRNORACEVR 8* 10* 11*  11*        Plan  - GRACE is improving. Continue gentle IV fluids  - Nephrology consulted; appreciate recs   - Avoid nephrotoxins and renally dose meds for GFR listed above  - Monitor urine output, serial BMP, and adjust therapy as needed

## 2024-08-29 NOTE — PLAN OF CARE
Problem: Adult Inpatient Plan of Care  Goal: Plan of Care Review  Outcome: Progressing  Goal: Patient-Specific Goal (Individualized)  Outcome: Progressing  Goal: Absence of Hospital-Acquired Illness or Injury  Outcome: Progressing  Goal: Optimal Comfort and Wellbeing  Outcome: Progressing  Goal: Readiness for Transition of Care  Outcome: Progressing    Plan of care reviewed with patient. Verbalizes understanding. Continue IVF NS @ 75 ml/hr. Fall precautions maintained. Dilaudid and Percocet PRN for pain. Continue plan of care.

## 2024-08-30 LAB
ALBUMIN SERPL BCP-MCNC: 2.9 G/DL (ref 3.5–5.2)
ALBUMIN SERPL BCP-MCNC: 2.9 G/DL (ref 3.5–5.2)
ANION GAP SERPL CALC-SCNC: 11 MMOL/L (ref 8–16)
ANION GAP SERPL CALC-SCNC: 11 MMOL/L (ref 8–16)
BUN SERPL-MCNC: 32 MG/DL (ref 8–23)
BUN SERPL-MCNC: 32 MG/DL (ref 8–23)
CALCIUM SERPL-MCNC: 8.2 MG/DL (ref 8.7–10.5)
CALCIUM SERPL-MCNC: 8.2 MG/DL (ref 8.7–10.5)
CHLORIDE SERPL-SCNC: 114 MMOL/L (ref 95–110)
CHLORIDE SERPL-SCNC: 114 MMOL/L (ref 95–110)
CO2 SERPL-SCNC: 18 MMOL/L (ref 23–29)
CO2 SERPL-SCNC: 18 MMOL/L (ref 23–29)
CREAT SERPL-MCNC: 4.1 MG/DL (ref 0.5–1.4)
CREAT SERPL-MCNC: 4.1 MG/DL (ref 0.5–1.4)
ERYTHROCYTE [DISTWIDTH] IN BLOOD BY AUTOMATED COUNT: 16.5 % (ref 11.5–14.5)
EST. GFR  (NO RACE VARIABLE): 11 ML/MIN/1.73 M^2
EST. GFR  (NO RACE VARIABLE): 11 ML/MIN/1.73 M^2
FERRITIN SERPL-MCNC: 98 NG/ML (ref 20–300)
GLUCOSE SERPL-MCNC: 81 MG/DL (ref 70–110)
GLUCOSE SERPL-MCNC: 81 MG/DL (ref 70–110)
HCT VFR BLD AUTO: 26.4 % (ref 37–48.5)
HGB BLD-MCNC: 8.3 G/DL (ref 12–16)
IRON SERPL-MCNC: 83 UG/DL (ref 30–160)
MCH RBC QN AUTO: 28.4 PG (ref 27–31)
MCHC RBC AUTO-ENTMCNC: 31.4 G/DL (ref 32–36)
MCV RBC AUTO: 90 FL (ref 82–98)
PHOSPHATE SERPL-MCNC: 4.6 MG/DL (ref 2.7–4.5)
PHOSPHATE SERPL-MCNC: 4.6 MG/DL (ref 2.7–4.5)
PLATELET # BLD AUTO: 154 K/UL (ref 150–450)
PMV BLD AUTO: 9 FL (ref 9.2–12.9)
POTASSIUM SERPL-SCNC: 4 MMOL/L (ref 3.5–5.1)
POTASSIUM SERPL-SCNC: 4 MMOL/L (ref 3.5–5.1)
RBC # BLD AUTO: 2.92 M/UL (ref 4–5.4)
SATURATED IRON: 32 % (ref 20–50)
SODIUM SERPL-SCNC: 143 MMOL/L (ref 136–145)
SODIUM SERPL-SCNC: 143 MMOL/L (ref 136–145)
TOTAL IRON BINDING CAPACITY: 259 UG/DL (ref 250–450)
TRANSFERRIN SERPL-MCNC: 175 MG/DL (ref 200–375)
WBC # BLD AUTO: 2.75 K/UL (ref 3.9–12.7)

## 2024-08-30 PROCEDURE — 99232 SBSQ HOSP IP/OBS MODERATE 35: CPT | Mod: HCNC,,, | Performed by: INTERNAL MEDICINE

## 2024-08-30 PROCEDURE — 97530 THERAPEUTIC ACTIVITIES: CPT | Mod: HCNC

## 2024-08-30 PROCEDURE — 25000003 PHARM REV CODE 250: Mod: HCNC | Performed by: NURSE PRACTITIONER

## 2024-08-30 PROCEDURE — 97116 GAIT TRAINING THERAPY: CPT | Mod: HCNC

## 2024-08-30 PROCEDURE — 82728 ASSAY OF FERRITIN: CPT | Mod: HCNC | Performed by: INTERNAL MEDICINE

## 2024-08-30 PROCEDURE — 36415 COLL VENOUS BLD VENIPUNCTURE: CPT | Mod: HCNC | Performed by: INTERNAL MEDICINE

## 2024-08-30 PROCEDURE — 85027 COMPLETE CBC AUTOMATED: CPT | Mod: HCNC | Performed by: INTERNAL MEDICINE

## 2024-08-30 PROCEDURE — 80069 RENAL FUNCTION PANEL: CPT | Mod: HCNC | Performed by: INTERNAL MEDICINE

## 2024-08-30 PROCEDURE — 63600175 PHARM REV CODE 636 W HCPCS: Mod: JZ,EC,JG,HCNC | Performed by: INTERNAL MEDICINE

## 2024-08-30 PROCEDURE — 83540 ASSAY OF IRON: CPT | Mod: HCNC | Performed by: INTERNAL MEDICINE

## 2024-08-30 PROCEDURE — 63600175 PHARM REV CODE 636 W HCPCS: Mod: HCNC | Performed by: INTERNAL MEDICINE

## 2024-08-30 PROCEDURE — 63600175 PHARM REV CODE 636 W HCPCS: Mod: HCNC | Performed by: NURSE PRACTITIONER

## 2024-08-30 PROCEDURE — 25000003 PHARM REV CODE 250: Mod: HCNC | Performed by: STUDENT IN AN ORGANIZED HEALTH CARE EDUCATION/TRAINING PROGRAM

## 2024-08-30 PROCEDURE — 92523 SPEECH SOUND LANG COMPREHEN: CPT | Mod: HCNC

## 2024-08-30 PROCEDURE — 97110 THERAPEUTIC EXERCISES: CPT | Mod: HCNC

## 2024-08-30 PROCEDURE — 94761 N-INVAS EAR/PLS OXIMETRY MLT: CPT | Mod: HCNC

## 2024-08-30 PROCEDURE — 25000003 PHARM REV CODE 250: Mod: HCNC | Performed by: INTERNAL MEDICINE

## 2024-08-30 PROCEDURE — 21400001 HC TELEMETRY ROOM: Mod: HCNC

## 2024-08-30 RX ORDER — POLYETHYLENE GLYCOL 3350 17 G/17G
17 POWDER, FOR SOLUTION ORAL 2 TIMES DAILY
Status: DISCONTINUED | OUTPATIENT
Start: 2024-08-30 | End: 2024-09-01 | Stop reason: HOSPADM

## 2024-08-30 RX ADMIN — METHOCARBAMOL 750 MG: 750 TABLET ORAL at 10:08

## 2024-08-30 RX ADMIN — METHOCARBAMOL 750 MG: 750 TABLET ORAL at 04:08

## 2024-08-30 RX ADMIN — POLYETHYLENE GLYCOL 3350 17 G: 17 POWDER, FOR SOLUTION ORAL at 10:08

## 2024-08-30 RX ADMIN — PREGABALIN 50 MG: 25 CAPSULE ORAL at 10:08

## 2024-08-30 RX ADMIN — HEPARIN SODIUM 5000 UNITS: 5000 INJECTION INTRAVENOUS; SUBCUTANEOUS at 05:08

## 2024-08-30 RX ADMIN — HEPARIN SODIUM 5000 UNITS: 5000 INJECTION INTRAVENOUS; SUBCUTANEOUS at 10:08

## 2024-08-30 RX ADMIN — HYDRALAZINE HYDROCHLORIDE 25 MG: 25 TABLET ORAL at 10:08

## 2024-08-30 RX ADMIN — CARVEDILOL 6.25 MG: 6.25 TABLET, FILM COATED ORAL at 10:08

## 2024-08-30 RX ADMIN — HEPARIN SODIUM 5000 UNITS: 5000 INJECTION INTRAVENOUS; SUBCUTANEOUS at 04:08

## 2024-08-30 RX ADMIN — ONDANSETRON 4 MG: 2 INJECTION INTRAMUSCULAR; INTRAVENOUS at 03:08

## 2024-08-30 RX ADMIN — NIFEDIPINE 30 MG: 30 TABLET, FILM COATED, EXTENDED RELEASE ORAL at 10:08

## 2024-08-30 RX ADMIN — CARVEDILOL 6.25 MG: 6.25 TABLET, FILM COATED ORAL at 04:08

## 2024-08-30 RX ADMIN — SENNOSIDES AND DOCUSATE SODIUM 1 TABLET: 50; 8.6 TABLET ORAL at 10:08

## 2024-08-30 RX ADMIN — HYDROMORPHONE HYDROCHLORIDE 0.5 MG: 1 INJECTION, SOLUTION INTRAMUSCULAR; INTRAVENOUS; SUBCUTANEOUS at 10:08

## 2024-08-30 RX ADMIN — Medication 1 ENEMA: at 04:08

## 2024-08-30 RX ADMIN — ASPIRIN 81 MG: 81 TABLET, COATED ORAL at 10:08

## 2024-08-30 RX ADMIN — ZOLPIDEM TARTRATE 5 MG: 5 TABLET ORAL at 10:08

## 2024-08-30 RX ADMIN — EPOETIN ALFA-EPBX 10000 UNITS: 10000 INJECTION, SOLUTION INTRAVENOUS; SUBCUTANEOUS at 04:08

## 2024-08-30 RX ADMIN — ACETAMINOPHEN 650 MG: 325 TABLET ORAL at 10:08

## 2024-08-30 RX ADMIN — CYCLOSPORINE 75 MG: 25 CAPSULE, LIQUID FILLED ORAL at 10:08

## 2024-08-30 RX ADMIN — PANTOPRAZOLE SODIUM 40 MG: 40 TABLET, DELAYED RELEASE ORAL at 10:08

## 2024-08-30 RX ADMIN — HYDRALAZINE HYDROCHLORIDE 25 MG: 25 TABLET ORAL at 04:08

## 2024-08-30 NOTE — PLAN OF CARE
A238/A238 NAYEYaneth Damon is a 69 y.o.female admitted on 8/27/2024 for Acute on chronic kidney failure   Code Status: Full Code MRN: [id number]   Review of patient's allergies indicates:   Allergen Reactions    Lisinopril Swelling and Rash    Augmentin [amoxicillin-pot clavulanate] Diarrhea    Zyvox [linezolid] Nausea And Vomiting     Past Medical History:   Diagnosis Date    Abdominal wall hernia     CT Renal 6/11/2018---Small fat containing superior ventral abdominal wall hernia at the epicardial pacing lead site.    Abnormal mammogram 10/12/2021    Acute cystitis without hematuria 05/10/2022    GRACE (acute kidney injury) 11/22/2021    Anxiety     Arthritis     ZEN HIPS    Breast cancer in female 08/2021    LEFT BREAST    Bronchitis 08/18/2016    Never smoked      C. difficile colitis 11/29/2021    Cellulitis of axilla, left 12/23/2021    Chronic diastolic heart failure 12/16/2021    Chronic kidney disease     stage 4, GFR 15-29 ml/min    Chronic midline low back pain without sciatica 06/18/2018    Closed nondisplaced fracture of distal phalanx of left great toe with routine healing 10/22/2018    Coronary artery disease 1993    heart transplant    COVID-19 in immunocompromised patient 02/26/2024    Cystitis 05/10/2022    Depression     Encounter for blood transfusion     Fibromyalgia     on Lyrica    Heart failure     native heart cardiomyopathy    Heart transplanted 1993    due to cardiomyopathy    History of hyperparathyroidism; Hyperparathyroidism, secondary renal     PT DENIES    Hypertension     Immune disorder     anti rejection meds    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    Impaired mobility 07/28/2022    Iron deficiency anemia 08/15/2017    Kidney stones     passed per pt    Obesity     Obesity (BMI 30.0-34.9) 07/22/2019    Other osteoporosis without current pathological fracture 08/30/2019    Other pancytopenia 05/06/2024    Parotitis, acute 09/19/2023    Pharyngitis 01/09/2019     Pneumonia due to infectious organism 03/14/2024    PONV (postoperative nausea and vomiting)     Severe sepsis 11/22/2021    Shingles 2003 approx    left leg    Subclinical hypothyroidism 06/16/2023    Thrombocytopenia, unspecified 11/29/2021    Trouble in sleeping     Urinary incontinence       PRN meds    acetaminophen, 650 mg, Q8H PRN  HYDROmorphone, 0.5 mg, Q4H PRN  ondansetron, 4 mg, Q8H PRN  oxyCODONE-acetaminophen, 1 tablet, Q4H PRN  sodium chloride 0.9%, 10 mL, PRN  zolpidem, 5 mg, Nightly PRN      Chart check completed. Will continue plan of care.      Orientation: oriented x 4  Pioneer Coma Scale Score: 15     Lead Monitored: Lead II Rhythm: normal sinus rhythm    Cardiac/Telemetry Box Number: [id number]  VTE Required Core Measure: Pharmacological prophylaxis initiated/maintained Last Bowel Movement: 08/25/24  Diet Renal Standard Tray  Voiding Characteristics: external catheter  Gabe Score: 20  Fall Risk Score: 12  Accucheck []   Freq?      Lines/Drains/Airways       Peripheral Intravenous Line  Duration                  Peripheral IV - Single Lumen 08/27/24 1253 20 G Right Antecubital 2 days

## 2024-08-30 NOTE — PLAN OF CARE
TX COMPLETED: facilitated bed mobility with CGA, transfers with min A, ambulated 200 ft min A with RW with complaints of lightheadedness. Recommend cont POC

## 2024-08-30 NOTE — PROGRESS NOTES
Nephrology Progress Note     History of Present Illness     69-year-old woman with complicated past medical history.  She underwent heart transplantation in 1991.  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  Recent history is significant for CVA which was treated with tPA.  It was apparently felt her transplant and heart has a PFO.  She was hospitalized here for CVA recently and then transferred to [facility] July 29th.  At that point her creatinine was running 3.0 (see below).       With regards to CKD, she has followed with Dr. Crawford of [facility].  She is felt to have stage IV CKD.  Creatinine in May when she last saw Nephrology was 2.6.  That correlated with a GFR of 19 cc/minute.  She has had significant proteinuria which has been addressed with ARB.  Remotely had 6 g of proteinuria but more recently had less than 1 g.  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  She has had nephrolithiasis as well.  She has had associated anemia and significant hyperparathyroidism.  Most recent clinic note from Dr. Crawford suggests concern for primary hyperparathyroidism.  A primary nuclear scan revealed no evidence of parathyroid adenomas.  She has required potassium binder.  Her last clinic note from Nephrology made no mention of any plan for RRT should renal function worsen.  Of note, she has a history of urge incontinence and apparently underwent some form of urologic device placement in the past.     The patient was readmitted on August 27th with worsening of chronic back pain.  She is followed by neurosurgery for this problem.  On admission creatinine well above baseline at 5.5.  This quickly improved with conservative therapy.  Nephrology consulted regarding acute kidney injury superimposed upon chronic kidney  disease.  Urinalysis with 1+ proteinuria but otherwise not concerning.  No renal imaging was done.  CT of abdomen and pelvis without contrast done June 27th revealed no stones and no hydronephrosis.  Her medication list on admission did not reveal aforementioned ARB.    Interval History     Overnight/currently:  Afebrile.  Blood pressure somewhat labile but has been very well-controlled for most of the last 24 hours.  Nonoliguric.  Good urine output today.  On half-normal saline at 500 cc/hour with bicarbonate.  Creatinine continues to improve.  Bicarbonate stable on bicarbonate containing fluids.  Other electrolytes not concerning.  Remains anemic.  She has adequate iron stores.  Noted patient received phosphate based enema today.  Would never use this in this patient again given her renal failure and risks for acute renal failure from such.  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  Patient's main complaint is of constipation.  No chest pain.  Minimal dyspnea.  She does have some nausea with her constipation.     Health Status   Allergies:    is allergic to lisinopril, augmentin [amoxicillin-pot clavulanate], and zyvox [linezolid].    Current medications:     Current Facility-Administered Medications:     0.45% NaCl infusion, , Intravenous, Continuous, Steven Plummer MD, Last Rate: 75 mL/hr at 08/29/24 1732, New Bag at 08/29/24 1732    acetaminophen tablet 650 mg, 650 mg, Oral, Q8H PRN, Enma Cotto, FNP, 650 mg at 08/29/24 1150    aspirin EC tablet 81 mg, 81 mg, Oral, Daily, Enma Cotto, FNP, 81 mg at 08/30/24 1014    carvediloL tablet 6.25 mg, 6.25 mg, Oral, BID WM, Enma Cotto, FNP, 6.25 mg at 08/30/24 1014    cycloSPORINE modified (NEORAL) (NEORAL) capsule 75 mg, 75 mg, Oral, BID, Enma Cotto, FNP, 75 mg at 08/30/24 1014    heparin (porcine) injection 5,000 Units, 5,000 Units, Subcutaneous, Q8H, Enma Cotto, FNP, 5,000 Units at 08/30/24  "0548    hydrALAZINE tablet 25 mg, 25 mg, Oral, Q8H, Enma Cotto, FNP, 25 mg at 08/29/24 2120    HYDROmorphone injection 0.5 mg, 0.5 mg, Intravenous, Q4H PRN, Alma Monreal MD, 0.5 mg at 08/30/24 1028    methocarbamoL tablet 750 mg, 750 mg, Oral, TID, Enma Cotto, FNP, 750 mg at 08/30/24 1013    NIFEdipine 24 hr tablet 30 mg, 30 mg, Oral, Daily, Enma Cotto, FNP, 30 mg at 08/30/24 1014    ondansetron injection 4 mg, 4 mg, Intravenous, Q8H PRN, Enma Cotto, RUDY, 4 mg at 08/28/24 2127    oxyCODONE-acetaminophen  mg per tablet 1 tablet, 1 tablet, Oral, Q4H PRN, Alma Monreal MD, 1 tablet at 08/28/24 1853    pantoprazole EC tablet 40 mg, 40 mg, Oral, Daily, Enma Cotto, FNP, 40 mg at 08/30/24 1014    polyethylene glycol packet 17 g, 17 g, Oral, BID, Rachid Rothmana, DO    pregabalin capsule 50 mg, 50 mg, Oral, BID, Alma Monreal MD, 50 mg at 08/30/24 1014    senna-docusate 8.6-50 mg per tablet 1 tablet, 1 tablet, Oral, BID, Rachid Rothmana, DO, 1 tablet at 08/30/24 1014    sodium chloride 0.9% flush 10 mL, 10 mL, Intravenous, PRN, Enma Cotto, FNP    sodium phosphates 19-7 gram/118 mL enema 1 enema, 1 enema, Rectal, Once, Rachid Rothmana, DO    zolpidem tablet 5 mg, 5 mg, Oral, Nightly PRN, Vesta Romero MD, 5 mg at 08/29/24 2128     Physical Examination   VS/Measurements   BP (!) 178/86 (BP Location: Left arm, Patient Position: Sitting) Comment: in pain, ambulated to restroom right before vitals were done  Pulse 95   Temp 98.2 °F (36.8 °C) (Oral)   Resp 18   Ht 5' 2" (1.575 m)   Wt 71.7 kg (158 lb 1.1 oz)   LMP 06/20/1983 (Within Years)   SpO2 99%   BMI 28.91 kg/m²      General:  Alert and oriented X3, No acute distress.         Nutritional status: Obese.    Neck:  Supple, No lymphadenopathy.     Respiratory:  Lungs are clear to auscultation, Respirations are non-labored, Symmetrical chest wall expansion.    Cardiovascular:  Normal rate, Regular rhythm. "   Gastrointestinal:  Soft, Non-tender, Normal bowel sounds.   Integumentary:  Warm, Dry.   Psychiatric:  Cooperative, Appropriate mood & affect.        Review / Management   Laboratory Results   Today's Lab Results :    Recent Results (from the past 24 hour(s))   Renal Function Panel    Collection Time: 08/30/24  5:03 AM   Result Value Ref Range    Glucose 81 70 - 110 mg/dL    Sodium 143 136 - 145 mmol/L    Potassium 4.0 3.5 - 5.1 mmol/L    Chloride 114 (H) 95 - 110 mmol/L    CO2 18 (L) 23 - 29 mmol/L    BUN 32 (H) 8 - 23 mg/dL    Calcium 8.2 (L) 8.7 - 10.5 mg/dL    Creatinine 4.1 (H) 0.5 - 1.4 mg/dL    Albumin 2.9 (L) 3.5 - 5.2 g/dL    Phosphorus 4.6 (H) 2.7 - 4.5 mg/dL    eGFR 11 (A) >60 mL/min/1.73 m^2    Anion Gap 11 8 - 16 mmol/L   CBC Without Differential    Collection Time: 08/30/24  5:03 AM   Result Value Ref Range    WBC 2.75 (L) 3.90 - 12.70 K/uL    RBC 2.92 (L) 4.00 - 5.40 M/uL    Hemoglobin 8.3 (L) 12.0 - 16.0 g/dL    Hematocrit 26.4 (L) 37.0 - 48.5 %    MCV 90 82 - 98 fL    MCH 28.4 27.0 - 31.0 pg    MCHC 31.4 (L) 32.0 - 36.0 g/dL    RDW 16.5 (H) 11.5 - 14.5 %    Platelets 154 150 - 450 K/uL    MPV 9.0 (L) 9.2 - 12.9 fL   Iron and TIBC    Collection Time: 08/30/24  5:03 AM   Result Value Ref Range    Iron 83 30 - 160 ug/dL    Transferrin 175 (L) 200 - 375 mg/dL    TIBC 259 250 - 450 ug/dL    Saturated Iron 32 20 - 50 %   Ferritin    Collection Time: 08/30/24  5:03 AM   Result Value Ref Range    Ferritin 98 20.0 - 300.0 ng/mL   Renal Function Panel    Collection Time: 08/30/24  5:03 AM   Result Value Ref Range    Glucose 81 70 - 110 mg/dL    Sodium 143 136 - 145 mmol/L    Potassium 4.0 3.5 - 5.1 mmol/L    Chloride 114 (H) 95 - 110 mmol/L    CO2 18 (L) 23 - 29 mmol/L    BUN 32 (H) 8 - 23 mg/dL    Calcium 8.2 (L) 8.7 - 10.5 mg/dL    Creatinine 4.1 (H) 0.5 - 1.4 mg/dL    Albumin 2.9 (L) 3.5 - 5.2 g/dL    Phosphorus 4.6 (H) 2.7 - 4.5 mg/dL    eGFR 11 (A) >60 mL/min/1.73 m^2    Anion Gap 11 8 - 16 mmol/L         Impression and Plan   Diagnosis     Back pain.  Recent chart notes indicate high-grade dyspnea narrowing at T11 and T12 and bulge at L4-L5 with foraminal stenosis.  She is followed by Neurosurgery chronically.  Per primary.       Recent acute right MCA CVA.  Concerns for PFO on her transplant and heart.  CVA lead to dysarthria and right hemiplegia.  Transferred here from rehabilitation facility.  Suspect we will go back.     Transplanted heart.  Underwent heart transplantation in 1981.  On cyclosporine.  Continue.  Of note, PFO reported in her heart.     Acute kidney injury superimposed upon known, stage 4 chronic kidney disease.  Likely volume depleted.  Improving with volume resuscitation.  Do not restart ARB anytime soon.  Would defer to outpatient nephrologist on that.     Stage IV CKD.  Followed by Dr. Crawford.  Baseline creatinine running 2.5-3.  She has had proteinuria.     Proteinuria.  More recently off of ARB and would continue to hold that.       Hypertension in CKD.  Adequate control.  Some higher blood pressures intermittently possibly due to pain.     Anemia of chronic kidney disease.  Iron stores adequate.  We will add GUILLERMINA.     CKD-MBD/secondary hyperparathyroidism of renal origin.  Phosphorus borderline elevated.  Stable.     Metabolic acidosis.  On fluids with bicarbonate.     Chronic diastolic heart failure.  Likely exacerbated by advanced CKD.       Leukopenia.  Chronic and intermittent issue.     Hyperlipidemia.  On no medical therapy.     History of breast cancer.  Hardly DCIS.  Status post chemotherapy.  On raloxifene outpatient.    Constipation.  As above, avoid phosphate enemas.  We will speak with nurse to relay message to primary team.    Disposition: Nephrology had a little now.  Improving renal function with conservative therapy.  We will sign off.  Needs to follow up outpatient with .  _______________________________________   Steven Plummer MD    This document was created  using voice recognition software.  It is possible that there are errors which have persisted after original proofreading.  If there is a question regarding contents of this document please contact me for clarification.

## 2024-08-30 NOTE — PT/OT/SLP PROGRESS
Occupational Therapy   Treatment    Name: Nadia Damon  MRN: [id number]  Admitting Diagnosis:  Acute on chronic kidney failure       Recommendations:     Discharge Recommendations: High Intensity Therapy  Discharge Equipment Recommendations:  to be determined by next level of care  Barriers to discharge:  Decreased caregiver support    Assessment:     Nadia Damon is a 69 y.o. female with a medical diagnosis of Acute on chronic kidney failure.  Performance deficits affecting function are weakness, gait instability, decreased upper extremity function, decreased lower extremity function, impaired balance, impaired endurance, decreased safety awareness, decreased ROM, impaired self care skills, impaired functional mobility, pain, decreased coordination.     Rehab Prognosis:  Good; patient would benefit from acute skilled OT services to address these deficits and reach maximum level of function.       Plan:     Patient to be seen 2 x/week to address the above listed problems via self-care/home management, therapeutic activities, therapeutic exercises  Plan of Care Expires: 09/11/24  Plan of Care Reviewed with: patient    Subjective     Chief Complaint: Slight dizziness  Patient/Family Comments/goals: Increase independence  Pain/Comfort:  Pain Rating 1: 0/10  Pain Rating Post-Intervention 1: 0/10    Objective:     Communicated with: Nurse prior to session.  Patient found supine with telemetry, peripheral IV, PureWick upon OT entry to room.    General Precautions: Standard, fall    Orthopedic Precautions:N/A  Braces: N/A  Respiratory Status: Room air     Occupational Performance:     Bed Mobility:    Patient completed Rolling/Turning to Right with contact guard assistance  Patient completed Scooting/Bridging with contact guard assistance  Patient completed Supine to Sit with contact guard assistance     Functional Mobility/Transfers:  Patient completed Sit <> Stand Transfer with minimum assistance  with  rolling  walker   Patient completed Bed <> Chair Transfer using Stand Pivot technique with minimum assistance with rolling walker  Functional Mobility: Pt ambulated with RW min A x200 feet with reports of dizziness, requiring cues for deep breathing and for safety      [facility] 6 Click ADL: 16    Treatment & Education:  Pt ambulated x200 feet with RW min A with slow pace and reports of slight dizziness. Pt transferred to bedside chair min A with RW. Pt encouraged to increase time OOB to increase functional strength/endurance needed for daily activities. Pt verbalized understanding. Pt encouraged to perform BUE AROM exercises throughout the day to further improve functional strength. Pt verbalized understanding.     Patient left up in chair with all lines intact, call button in reach, and chair alarm on    GOALS:   Multidisciplinary Problems       Occupational Therapy Goals          Problem: Occupational Therapy    Goal Priority Disciplines Outcome Interventions   Occupational Therapy Goal     OT, PT/OT Progressing    Description: O.T. goals to be met by 9-11-24  SBA with toilet transfer  S with ue dressing  S with le dressing  Pt will tolerate 1 set x 10 reps b ue rom exercise in all available pain-free planes and ranges                        Time Tracking:     OT Date of Treatment: 08/30/24  OT Start Time: 0845  OT Stop Time: 0910  OT Total Time (min): 25 min    Billable Minutes:Therapeutic Activity 25    LINDA Hansen  OT/FIORELLA: OT          8/30/2024

## 2024-08-30 NOTE — PT/OT/SLP EVAL
"Speech Language Pathology Evaluation  Cognitive/Bedside Swallow    Patient Name:  Nadia Damon   MRN:  [id number]  Admitting Diagnosis: Acute on chronic kidney failure    Recommendations:                  General Recommendations:  Speech/language therapy, Voice therapy, and Bedside Swallow Assessment   Diet recommendations:   ,   Diet per MD (pt w nausea, requested deferred CSE)  Aspiration Precautions: Standard aspiration precautions   General Precautions: Standard, aspiration  Communication strategies:  provide increased time to answer    Assessment:     Nadia Damon is a 69 y.o. female with a PMHx of CHF, CKD, heart transplant, CVA, and chronic back pain who presented to the ED s/t increasing back pain. She is known to ST from previous admission from 7/20/2024-7/29/2024. Pt w persistent dysphonia characterized by hoarse/strained vocal quality w intermittent pitch breaks. Recently seen by Dr. Godinez, Otolaryngologist, w laryngoscopy significant for "Vocal fold abduction and adduction is partially limited on the right, but she does compensate fairly well with mild glottic gap. " She endorses that since previous admission she has begun outpatient ST at  [facility] mostly focusing on fluency, but that she will soon begin to target voice as well. Pt persists w stuttering-like speech characterized by whole and part word repetitions, continued unknown psychogenic vs neurogenic in nature. Although disfluency persists from last admission, pt w marked increase in fluency able to communicate acute wants/needs efficiently. No acute changes to pt's speech/language/cog/voice at this time, chronic findings persist. Bedside swallow assessment deferred at this time as pt w c/o constipation and nausea. She reports likely will need enema soon as has not had BM since Sunday. She reports that she does not wish to eat or drink at this time. Pt does not endorse dysphagia. She notes periodic instances of taking too large of a sip or " bite and getting distracted that negatively impact swallowing. She reports this is not a consistent complaint. Diet to be ordered by recommendation of treating MD at this time. Pt is recommended to continue prior ST upon discharge. ST to follow-up to complete bedside swallow assessment.     History:     Past Medical History:   Diagnosis Date    Abdominal wall hernia     CT Renal 6/11/2018---Small fat containing superior ventral abdominal wall hernia at the epicardial pacing lead site.    Abnormal mammogram 10/12/2021    Acute cystitis without hematuria 05/10/2022    GRACE (acute kidney injury) 11/22/2021    Anxiety     Arthritis     EZN HIPS    Breast cancer in female 08/2021    LEFT BREAST    Bronchitis 08/18/2016    Never smoked      C. difficile colitis 11/29/2021    Cellulitis of axilla, left 12/23/2021    Chronic diastolic heart failure 12/16/2021    Chronic kidney disease     stage 4, GFR 15-29 ml/min    Chronic midline low back pain without sciatica 06/18/2018    Closed nondisplaced fracture of distal phalanx of left great toe with routine healing 10/22/2018    Coronary artery disease 1993    heart transplant    COVID-19 in immunocompromised patient 02/26/2024    Cystitis 05/10/2022    Depression     Encounter for blood transfusion     Fibromyalgia     on Lyrica    Heart failure     native heart cardiomyopathy    Heart transplanted 1993    due to cardiomyopathy    History of hyperparathyroidism; Hyperparathyroidism, secondary renal     PT DENIES    Hypertension     Immune disorder     anti rejection meds    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    Impaired mobility 07/28/2022    Iron deficiency anemia 08/15/2017    Kidney stones     passed per pt    Obesity     Obesity (BMI 30.0-34.9) 07/22/2019    Other osteoporosis without current pathological fracture 08/30/2019    Other pancytopenia 05/06/2024    Parotitis, acute 09/19/2023    Pharyngitis 01/09/2019    Pneumonia due to infectious  organism 03/14/2024    PONV (postoperative nausea and vomiting)     Severe sepsis 11/22/2021    Shingles 2003 approx    left leg    Subclinical hypothyroidism 06/16/2023    Thrombocytopenia, unspecified 11/29/2021    Trouble in sleeping     Urinary incontinence        Past Surgical History:   Procedure Laterality Date    bladder implant Right 06/11/2024    BLADDER SURGERY  2015 approx    mesh - Dr Everett then 2nd reconstructive sx Dr Onofre    BREAST BIOPSY Bilateral     NEGATIVE    BREAST BIOPSY Right 10/31/2022    benign    BREAST LUMPECTOMY Left 2021    BREAST SURGERY Left 09/28/2015    Bx - benign    BREAST SURGERY Right 12/2015    Bx benign    CARDIAC PACEMAKER REMOVAL Left 06/26/2014    Pacer defirillator removed. Put in 1993 aat time of heart transplant    CARPAL TUNNEL RELEASE Left 03/03/2015    Dr. Hall    COLONOSCOPY N/A 02/25/2021    Procedure: COLONOSCOPY;  Surgeon: Freida Ramirez MD;  Location: [facility] ENDO;  Service: Endoscopy;  Laterality: N/A;    CYSTOCELE REPAIR      Twice with mesh removal    EPIDURAL STEROID INJECTION INTO CERVICAL SPINE N/A 02/02/2023    Procedure: T11/T12 IL HELLEN;  Surgeon: Jassi Pierre MD;  Location: [facility] PAIN MGT;  Service: Pain Management;  Laterality: N/A;    HEART TRANSPLANT  1993    HERNIA REPAIR Right 1971 approx    Inguinal    HYSTERECTOMY  1983    vag hyst /LSO     IMPLANTATION OF PERMANENT SACRAL NERVE STIMULATOR N/A 06/11/2024    Procedure: INSERTION, NEUROSTIMULATOR, PERMANENT, SACRAL;  Surgeon: Sami Cochran MD;  Location: [facility] OR;  Service: Urology;  Laterality: N/A;    INCISION AND DRAINAGE OF ABSCESS Left 12/24/2021    Procedure: INCISION AND DRAINAGE, ABSCESS;  Surgeon: Joseph Longo MD;  Location: [facility] OR;  Service: General;  Laterality: Left;    INJECTION OF ANESTHETIC AGENT AROUND MEDIAL BRANCH NERVES INNERVATING LUMBAR FACET JOINT Right 10/19/2022    Procedure: Right L4/L5 and L5/S1 MBB;  Surgeon: Jassi Pierre MD;  Location: [facility]  PAIN MGT;  Service: Pain Management;  Laterality: Right;    INJECTION OF ANESTHETIC AGENT AROUND MEDIAL BRANCH NERVES INNERVATING LUMBAR FACET JOINT Right 11/09/2022    Procedure: Right L4/L5 and L5/S1 MBB;  Surgeon: Jassi Pierre MD;  Location: [facility] PAIN MGT;  Service: Pain Management;  Laterality: Right;    INJECTION OF ANESTHETIC AGENT INTO SACROILIAC JOINT Right 08/22/2022    Procedure: Right SIJ Injection Right L5/S1 Facte Injection;  Surgeon: Jassi Pierre MD;  Location: [facility] PAIN MGT;  Service: Pain Management;  Laterality: Right;    INSERTION OF TUNNELED CENTRAL VENOUS CATHETER (CVC) WITH SUBCUTANEOUS PORT N/A 11/09/2021    Procedure: [id number]-J-CATH;  Surgeon: Christoph Douglas MD;  Location: [facility] OR;  Service: General;  Laterality: N/A;    RADIOFREQUENCY THERMOCOAGULATION Right 12/07/2022    Procedure: Right L4/L5 and L5/S1 Lumbar RFA;  Surgeon: Jassi Pierre MD;  Location: [facility] PAIN MGT;  Service: Pain Management;  Laterality: Right;    REMOVAL OF VASCULAR ACCESS PORT      ROBOT-ASSISTED LAPAROSCOPIC ABDOMINAL SACROCOLPOPEXY N/A 08/10/2023    Procedure: ROBOTIC SACROCOLPOPEXY, ABDOMEN;  Surgeon: PRANAY Villalobos MD;  Location: [facility] OR;  Service: OB/GYN;  Laterality: N/A;    ROBOT-ASSISTED LAPAROSCOPIC OOPHORECTOMY Right 08/10/2023    Procedure: ROBOTIC OOPHORECTOMY;  Surgeon: PRANAY Villalobos MD;  Location: [facility] OR;  Service: OB/GYN;  Laterality: Right;    SENTINEL LYMPH NODE BIOPSY Left 10/12/2021    Procedure: BIOPSY, LYMPH NODE, SENTINEL;  Surgeon: Christoph Douglas MD;  Location: [facility] OR;  Service: General;  Laterality: Left;    TOE SURGERY      XI ROBOTIC URETHROPEXY N/A 08/10/2023    Procedure: XI ROBOTIC URETHROPEXY;  Surgeon: PRANAY Villalobos MD;  Location: [facility] OR;  Service: OB/GYN;  Laterality: N/A;       Prior Intubation HX:  NA this admission    Modified Barium Swallow: NA per pt and chart review    XR CHEST AP PORTABLE on 07/19/2024:    FINDINGS:  Cardiomegaly with  median sternotomy.  Left axillary surgical clips.  Left basilar atelectasis.  Abandoned lead in the left lung base.  Bones are intact.  Essentially stable exam     Impression:  No acute abnormality.     Electronically signed by:Enrique Case  Date:                                            07/19/2024  Time:                                           22:35       Procedure -Transnasal fiberoptic laryngoscopy  on 08/23/2024:     Surgeon: Guicho Godinez M.D. .       Anesthesia: topical 0.05% oxymetazoline with 4% lidocaine       Complications: None.      Description of Procedure: With the patient in the sitting position, topical lidocaine and oxymetazoline was applied to the nose. The scope was passed through the nose. Examination was carried out of the nose, nasopharynx, oropharynx, hypopharynx, and larynx with findings as noted below. Scope was removed. The patient tolerated the procedure well.       Findings: No masses or lesions in the nose, nasopharynx, oropharynx, hypopharynx, or larynx. Vocal fold abduction and adduction is partially limited on the right, but she does compensate fairly well with mild glottic gap. No pooling of secretions in the piriform sinuses, penetration, or aspiration.        Assessment/Plan:     1. Oropharyngeal dysphagia    2. Vocal cord paresis    3. Cerebrovascular accident (CVA), unspecified mechanism          Partial movement of the R TVC, suspect it has been improving as her overall weakness have improved as well  We discussed the possibility of continued spontaneous improvement over the next several months  She will continue VT for now and in 3 months if she has not had significant improvement in VC motion we may consider augmentation       Guicho Godinez MD    Prior diet: Regular diet.    Subjective     Pt seen laying in bedside chair upon ST arrival.   Patient goals: to continue to improve functional status     Objective:     Cognitive Status:    WFL , oriented x4     Receptive  Language:   Comprehension:   WFL    Pragmatics:    WFL    Expressive Language:  Verbal:    Pt w intact naming w improved fluency from past admission. She continues to present w disfluencies specifically part and whole word repetitions in speech, specifically when increasingly emotional. Pt reports that she is targeting this in OP ST at this time.      Motor Speech:  WFL    Voice:   Quality Hoarse and strained w pitch breaks intermittently. Known R VF paralysis.      Bedside Swallow Eval: Deferred as pt w c/o constipation and nausea. She reports likely will need enema soon as has not had BM since Sunday. She reports that she does not wish to eat or drink at this time. Pt does not endorse dysphagia. She notes periodic instances of taking too large of a sip or bite and getting distracted that negatively impact swallowing. She reports this is not a consistent complaint. ST to follow-up for bedside swallow assessment.     Goals:   Multidisciplinary Problems       SLP Goals          Problem: SLP    Goal Priority Disciplines Outcome   SLP Goal     SLP    Description: TPW participate in bedside swallow assessment                       Plan:     Patient to be seen:  2 x/week, 3 x/week   Plan of Care expires:  09/06/24  Plan of Care reviewed with:  patient   SLP Follow-Up:  Yes       Discharge recommendations:  Therapy Intensity Recommendations at Discharge: High Intensity Therapy   Barriers to Discharge:  None    Time Tracking:     SLP Treatment Date:   08/30/24  Speech Start Time:  1335  Speech Stop Time:  1405     Speech Total Time (min):  30 min    Billable Minutes: Eval 30     Kisha Chandler MA, PL-SLP, CF-SLP  Speech Language Pathologist  08/30/2024

## 2024-08-30 NOTE — PLAN OF CARE
Bed mobility aCGA.  Sit to stand min A with RW  Gait trained with RW min A x200 feet with reports of dizziness.    Transferred to bedside chair min A with all lines intact.    Recommend high intensity therapy at DC

## 2024-08-30 NOTE — PT/OT/SLP PROGRESS
Physical Therapy  Treatment    Nadia Damon   MRN: [id number]   Admitting Diagnosis: Acute on chronic kidney failure       PT Start Time: 0855     PT Stop Time: 0918    PT Total Time (min): 23 min       Billable Minutes:  Gait Training 15 and Therapeutic Exercise 8    Treatment Type: Treatment  PT/PTA: PT     Number of PTA visits since last PT visit: 0       General Precautions: Standard, fall  Orthopedic Precautions: N/A  Braces:  (pt may benefit from back bace)  Respiratory Status: Room air    Spiritual, Cultural Beliefs, Sabianism Practices, Values that Affect Care: no    Subjective:  Communicated with nursing (Darren) and performed chart review via epic system prior to session.  Pt agreeable to PT services    Pain/Comfort  Pain Rating 1: 7/10  Pain Addressed 1: Distraction, Reposition  Pain Rating Post-Intervention 1: 7/10    Objective:   Patient found with: peripheral IV, telemetry, PureWick    Functional Mobility:  Bed Mobility:    CGA supine to sit    Transfers:   Sit<>stand min A with RW    Stand pivot min A with RW    Gait:    Facilitated 200 ft min A with RW, demonstrated slow pace, flexed posture, guarded posture, narrow SYED, decreased step length and foot clearance      Balance:   Dynamic Sit: FAIR+: Maintains balance through MINIMAL excursions of active trunk motion  Dynamic stand: POOR+: Needs MIN (minimal ) assist during gait       Treatment and Education:  Educated pt on benefits of consistent participation in PT services to meet functional goals. Educated pt on seated therex to promote strength, circulation and joint mobility. Exercises included AP.  Educated to perform exercises intermittently throughout day to tolerance. Educated pt on importance of sitting OOB to promote endurance and overall activity tolerance. Educated pt on call don't fall policy and use of call button to alert nursing staff of needs (including to assist with returning back to bed). Pt expressed understanding.      AM-PAC 6  CLICK MOBILITY  How much help from another person does this patient currently need?   1 = Unable, Total/Dependent Assistance  2 = A lot, Maximum/Moderate Assistance  3 = A little, Minimum/Contact Guard/Supervision  4 = None, Modified Luce/Independent    Turning over in bed (including adjusting bedclothes, sheets and blankets)?: 3  Sitting down on and standing up from a chair with arms (e.g., wheelchair, bedside commode, etc.): 3  Moving from lying on back to sitting on the side of the bed?: 3  Moving to and from a bed to a chair (including a wheelchair)?: 3  Need to walk in hospital room?: 3  Climbing 3-5 steps with a railing?: 1  Basic Mobility Total Score: 16    AM-PAC Raw Score CMS G-Code Modifier Level of Impairment Assistance   6 % Total / Unable   7 - 9 CM 80 - 100% Maximal Assist   10 - 14 CL 60 - 80% Moderate Assist   15 - 19 CK 40 - 60% Moderate Assist   20 - 22 CJ 20 - 40% Minimal Assist   23 CI 1-20% SBA / CGA   24 CH 0% Independent/ Mod I     Patient left up in chair with all lines intact, call button in reach, chair alarm on, and nursing notified.    Assessment:  Nadia Damon is a 69 y.o. female with a medical diagnosis of Acute on chronic kidney failure and presents with deficits listed below which increase risk of falls. Pt may benefit from back brace to decrease pain with mobility. Continue POC. Recommend high intensity.    Rehab identified problem list/impairments: weakness, impaired endurance, impaired functional mobility, gait instability, impaired balance, decreased safety awareness, decreased lower extremity function, pain, decreased coordination    Rehab potential is good.    Activity tolerance: Good    Discharge recommendations: High Intensity Therapy      Barriers to discharge:      Equipment recommendations: to be determined by next level of care     GOALS:   Multidisciplinary Problems       Physical Therapy Goals          Problem: Physical Therapy    Goal Priority  Disciplines Outcome Goal Variances Interventions   Physical Therapy Goal     PT, PT/OT Progressing     Description: Goals to be met by 9/11/24.  1. Pt will complete bed mobility CGA.  2. Pt will complete sit to stand CGA.  3. Pt will ambulate 50ft CGA using RW.  4. Pt will increase AMPAC score by 2 points to progress functional mobility.                       PLAN:    Patient to be seen 3 x/week to address the above listed problems via gait training, therapeutic activities, therapeutic exercises, neuromuscular re-education  Plan of Care expires: 09/11/24  Plan of Care reviewed with: patient         08/30/2024

## 2024-08-31 LAB
ALBUMIN SERPL BCP-MCNC: 2.7 G/DL (ref 3.5–5.2)
ALBUMIN SERPL BCP-MCNC: 2.7 G/DL (ref 3.5–5.2)
ANION GAP SERPL CALC-SCNC: 11 MMOL/L (ref 8–16)
ANION GAP SERPL CALC-SCNC: 11 MMOL/L (ref 8–16)
BUN SERPL-MCNC: 31 MG/DL (ref 8–23)
BUN SERPL-MCNC: 31 MG/DL (ref 8–23)
CALCIUM SERPL-MCNC: 7.7 MG/DL (ref 8.7–10.5)
CALCIUM SERPL-MCNC: 7.7 MG/DL (ref 8.7–10.5)
CHLORIDE SERPL-SCNC: 117 MMOL/L (ref 95–110)
CHLORIDE SERPL-SCNC: 117 MMOL/L (ref 95–110)
CO2 SERPL-SCNC: 18 MMOL/L (ref 23–29)
CO2 SERPL-SCNC: 18 MMOL/L (ref 23–29)
CREAT SERPL-MCNC: 3.6 MG/DL (ref 0.5–1.4)
CREAT SERPL-MCNC: 3.6 MG/DL (ref 0.5–1.4)
EST. GFR  (NO RACE VARIABLE): 13 ML/MIN/1.73 M^2
EST. GFR  (NO RACE VARIABLE): 13 ML/MIN/1.73 M^2
GLUCOSE SERPL-MCNC: 73 MG/DL (ref 70–110)
GLUCOSE SERPL-MCNC: 73 MG/DL (ref 70–110)
PHOSPHATE SERPL-MCNC: 4.1 MG/DL (ref 2.7–4.5)
PHOSPHATE SERPL-MCNC: 4.1 MG/DL (ref 2.7–4.5)
POTASSIUM SERPL-SCNC: 3.8 MMOL/L (ref 3.5–5.1)
POTASSIUM SERPL-SCNC: 3.8 MMOL/L (ref 3.5–5.1)
SODIUM SERPL-SCNC: 146 MMOL/L (ref 136–145)
SODIUM SERPL-SCNC: 146 MMOL/L (ref 136–145)

## 2024-08-31 PROCEDURE — 25000003 PHARM REV CODE 250: Mod: HCNC | Performed by: INTERNAL MEDICINE

## 2024-08-31 PROCEDURE — 80069 RENAL FUNCTION PANEL: CPT | Mod: HCNC | Performed by: INTERNAL MEDICINE

## 2024-08-31 PROCEDURE — 36415 COLL VENOUS BLD VENIPUNCTURE: CPT | Mod: HCNC | Performed by: INTERNAL MEDICINE

## 2024-08-31 PROCEDURE — 92610 EVALUATE SWALLOWING FUNCTION: CPT | Mod: HCNC

## 2024-08-31 PROCEDURE — 25000003 PHARM REV CODE 250: Mod: HCNC | Performed by: NURSE PRACTITIONER

## 2024-08-31 PROCEDURE — 63600175 PHARM REV CODE 636 W HCPCS: Mod: HCNC | Performed by: NURSE PRACTITIONER

## 2024-08-31 PROCEDURE — 21400001 HC TELEMETRY ROOM: Mod: HCNC

## 2024-08-31 PROCEDURE — 25000003 PHARM REV CODE 250: Mod: HCNC | Performed by: STUDENT IN AN ORGANIZED HEALTH CARE EDUCATION/TRAINING PROGRAM

## 2024-08-31 RX ORDER — LIDOCAINE 50 MG/G
1 PATCH TOPICAL DAILY
Status: DISCONTINUED | OUTPATIENT
Start: 2024-08-31 | End: 2024-09-01 | Stop reason: HOSPADM

## 2024-08-31 RX ORDER — BISACODYL 10 MG/1
10 SUPPOSITORY RECTAL ONCE
Status: COMPLETED | OUTPATIENT
Start: 2024-08-31 | End: 2024-08-31

## 2024-08-31 RX ADMIN — OXYCODONE HYDROCHLORIDE AND ACETAMINOPHEN 1 TABLET: 10; 325 TABLET ORAL at 12:08

## 2024-08-31 RX ADMIN — PANTOPRAZOLE SODIUM 40 MG: 40 TABLET, DELAYED RELEASE ORAL at 09:08

## 2024-08-31 RX ADMIN — POLYETHYLENE GLYCOL 3350 17 G: 17 POWDER, FOR SOLUTION ORAL at 09:08

## 2024-08-31 RX ADMIN — CYCLOSPORINE 75 MG: 25 CAPSULE, LIQUID FILLED ORAL at 09:08

## 2024-08-31 RX ADMIN — ASPIRIN 81 MG: 81 TABLET, COATED ORAL at 09:08

## 2024-08-31 RX ADMIN — HEPARIN SODIUM 5000 UNITS: 5000 INJECTION INTRAVENOUS; SUBCUTANEOUS at 03:08

## 2024-08-31 RX ADMIN — METHOCARBAMOL 750 MG: 750 TABLET ORAL at 09:08

## 2024-08-31 RX ADMIN — HYDRALAZINE HYDROCHLORIDE 25 MG: 25 TABLET ORAL at 06:08

## 2024-08-31 RX ADMIN — CARVEDILOL 6.25 MG: 6.25 TABLET, FILM COATED ORAL at 04:08

## 2024-08-31 RX ADMIN — SENNOSIDES AND DOCUSATE SODIUM 1 TABLET: 50; 8.6 TABLET ORAL at 09:08

## 2024-08-31 RX ADMIN — HEPARIN SODIUM 5000 UNITS: 5000 INJECTION INTRAVENOUS; SUBCUTANEOUS at 06:08

## 2024-08-31 RX ADMIN — PREGABALIN 50 MG: 25 CAPSULE ORAL at 09:08

## 2024-08-31 RX ADMIN — SODIUM CHLORIDE: 4.5 INJECTION, SOLUTION INTRAVENOUS at 07:08

## 2024-08-31 RX ADMIN — LIDOCAINE 5% 1 PATCH: 700 PATCH TOPICAL at 04:08

## 2024-08-31 RX ADMIN — NIFEDIPINE 30 MG: 30 TABLET, FILM COATED, EXTENDED RELEASE ORAL at 09:08

## 2024-08-31 RX ADMIN — BISACODYL 10 MG: 10 SUPPOSITORY RECTAL at 07:08

## 2024-08-31 RX ADMIN — METHOCARBAMOL 750 MG: 750 TABLET ORAL at 03:08

## 2024-08-31 RX ADMIN — HYDRALAZINE HYDROCHLORIDE 25 MG: 25 TABLET ORAL at 03:08

## 2024-08-31 RX ADMIN — HEPARIN SODIUM 5000 UNITS: 5000 INJECTION INTRAVENOUS; SUBCUTANEOUS at 09:08

## 2024-08-31 RX ADMIN — CARVEDILOL 6.25 MG: 6.25 TABLET, FILM COATED ORAL at 09:08

## 2024-08-31 RX ADMIN — ZOLPIDEM TARTRATE 5 MG: 5 TABLET ORAL at 09:08

## 2024-08-31 RX ADMIN — HYDRALAZINE HYDROCHLORIDE 25 MG: 25 TABLET ORAL at 09:08

## 2024-08-31 NOTE — ASSESSMENT & PLAN NOTE
Patient is identified as having Diastolic (HFpEF) heart failure that is Chronic. CHF is currently controlled. Latest ECHO performed and demonstrates- Results for orders placed during the hospital encounter of 07/19/24    Echo Saline Bubble? Yes    Interpretation Summary    Left Ventricle: The left ventricle is normal in size. Normal wall thickness. There is normal systolic function with a visually estimated ejection fraction of 55 - 60%. There is normal diastolic function.    Right Ventricle: Normal right ventricular cavity size. Wall thickness is normal. Systolic function is normal.    Left Atrium: Patent foramen ovale visualized with predominant right to left shunting indicated by saline contrast.    Tricuspid Valve: There is mild regurgitation.    IVC/SVC: Normal venous pressure at 3 mmHg.    The patient is status post cardiac transplantation.  . Continue Beta Blocker and monitor clinical status closely. Monitor on telemetry. Patient is off CHF pathway.  Monitor strict Is&Os and daily weights.  Place on fluid restriction of 1.5 L. Cardiology has not been consulted. Continue to stress to patient importance of self efficacy and  on diet for CHF.

## 2024-08-31 NOTE — ASSESSMENT & PLAN NOTE
Patient is identified as having Diastolic (HFpEF) heart failure that is Chronic. CHF is currently controlled. Latest ECHO performed and demonstrates- Results for orders placed during the hospital encounter of 07/19/24    Echo Saline Bubble? Yes    Interpretation Summary    Left Ventricle: The left ventricle is normal in size. Normal wall thickness. There is normal systolic function with a visually estimated ejection fraction of 55 - 60%. There is normal diastolic function.    Right Ventricle: Normal right ventricular cavity size. Wall thickness is normal. Systolic function is normal.    Left Atrium: Patent foramen ovale visualized with predominant right to left shunting indicated by saline contrast.    Tricuspid Valve: There is mild regurgitation.    IVC/SVC: Normal venous pressure at 3 mmHg.    The patient is status post cardiac transplantation.  . Continue Beta Blocker and monitor clinical status closely. Monitor on telemetry. Patient is off CHF pathway.  Monitor strict Is&Os and daily weights.  Place on fluid restriction of 1.5 L. Cardiology has not been consulted. Continue to stress to patient importance of self efficacy and  on diet for CHF.

## 2024-08-31 NOTE — ASSESSMENT & PLAN NOTE
Anemia is likely due to chronic disease due to Chronic Kidney Disease. Most recent hemoglobin and hematocrit are listed below.  Recent Labs     08/29/24  0505 08/30/24  0503   HGB 8.3* 8.3*   HCT 26.9* 26.4*       Plan  - Monitor serial CBC: Daily  - Transfuse PRBC if patient becomes hemodynamically unstable, symptomatic or H/H drops below 7/21.  - Patient has not received any PRBC transfusions to date  - Patient's anemia is currently stable

## 2024-08-31 NOTE — PROGRESS NOTES
[facility] Medicine  Progress Note    Patient Name: Nadia Damon  MRN: [id number]  Patient Class: IP- Inpatient   Admission Date: 8/27/2024  Length of Stay: 2 days  Attending Physician: Rachid Rothman DO  Primary Care Provider: Elis Wick MD        Subjective:     Principal Problem:Acute on chronic kidney failure        HPI:  Nadia Damon is a 69 year old female with a PMHx of OA, Anxiety, CHF, CKD, CAD, Depression, Fibromyalgia, HTN, Heart transplant, GUSTAVO and Hypothyroidism who presented the Emergency Department with c/o acute on chronic back pain. Patient reports back pain as worsened over the last few days. She mentioned being scheduled for steroid injection but unable to get sooner appt. She was seen by Dr. Canales's PA (Neurosurgery) on 8/20/24 for same symptom complaint. Patient unable to undergo MRI so CT myelogram performed.  There is a disc herniation at T11-T12 causing moderate central spinal stenosis and severe right-sided foraminal stenosis. Recommended continued rehab at this time. ED workup showed: WBC count 3.45K, Hgb/Hct 9.0/27.9, Na+ 142, K+ 4.0, BUN 60, creatinine 5.5, UA negative for infectious process. Pt placed in observation for acute on chronic renal failure.     Overview/Hospital Course:  Pt started on gentle IV fluids, nephrology consulted. GRACE improving. CT L spine showed No acute fracture or dislocation.  Mild moderate multilevel degenerative disc disease. PT/OT rec placement on discharge, social work consulted.  Patient preferred follow up with outpatient rehab PT/OT/SLP where she had already establish care.  Trending renal function, nephrology following    Interval History: No acute events overnight, afebrile, hemodynamically stable.  Reports back pain remains stable on analgesics and multimodal pain regimen.      Objective:     Vital Signs (Most Recent):  Temp: 98.3 °F (36.8 °C) (08/1955)  Pulse: 80 (08/30/24 2031)  Resp: 16 (08/30/24  2031)  BP: 110/60 (08/1955)  SpO2: 98 % (08/30/24 2031) Vital Signs (24h Range):  Temp:  [97.4 °F (36.3 °C)-99 °F (37.2 °C)] 98.3 °F (36.8 °C)  Pulse:  [79-95] 80  Resp:  [16-20] 16  SpO2:  [96 %-100 %] 98 %  BP: (110-178)/(60-86) 110/60     Weight: 71.7 kg (158 lb 1.1 oz)  Body mass index is 28.91 kg/m².    Intake/Output Summary (Last 24 hours) at 8/30/2024 2056  Last data filed at 8/30/2024 2047  Gross per 24 hour   Intake 1576.1 ml   Output 1850 ml   Net -273.9 ml         Physical Exam  Vitals and nursing note reviewed.   Constitutional:       General: She is not in acute distress.     Appearance: She is not ill-appearing, toxic-appearing or diaphoretic.   HENT:      Head: Normocephalic and atraumatic.      Mouth/Throat:      Mouth: Mucous membranes are moist.   Eyes:      General: No scleral icterus.        Right eye: No discharge.         Left eye: No discharge.   Cardiovascular:      Rate and Rhythm: Normal rate and regular rhythm.      Heart sounds: Normal heart sounds.   Pulmonary:      Effort: Pulmonary effort is normal. No respiratory distress.      Breath sounds: Normal breath sounds.   Abdominal:      General: Bowel sounds are normal.      Palpations: Abdomen is soft.      Tenderness: There is no abdominal tenderness.   Musculoskeletal:      Cervical back: No rigidity.      Right lower leg: No edema.      Left lower leg: No edema.   Skin:     General: Skin is warm and dry.      Coloration: Skin is not jaundiced.   Neurological:      Mental Status: She is oriented to person, place, and time. Mental status is at baseline.   Psychiatric:         Mood and Affect: Mood normal.         Behavior: Behavior normal.             Significant Labs: All pertinent labs within the past 24 hours have been reviewed.  CBC:   Recent Labs   Lab 08/29/24  0505 08/30/24  0503   WBC 2.82* 2.75*   HGB 8.3* 8.3*   HCT 26.9* 26.4*    154     CMP:   Recent Labs   Lab 08/29/24  0504 08/30/24  0503     144 143   143   K 3.7  3.7 4.0  4.0   *  115* 114*  114*   CO2 18*  18* 18*  18*   GLU 70  70 81  81   BUN 36*  36* 32*  32*   CREATININE 4.1*  4.1* 4.1*  4.1*   CALCIUM 8.0*  8.0* 8.2*  8.2*   ALBUMIN 2.9*  2.9* 2.9*  2.9*   ANIONGAP 11  11 11  11       Significant Imaging: I have reviewed all pertinent imaging results/findings within the past 24 hours.    Assessment/Plan:      * Acute on chronic kidney failure  GRACE is likely due to pre-renal azotemia due to intravascular volume depletion. Baseline creatinine is  around 3.0 . Most recent creatinine and eGFR are listed below.  Recent Labs     08/28/24  0517 08/29/24  0504 08/30/24  0503   CREATININE 4.5* 4.1*  4.1* 4.1*  4.1*   EGFRNORACEVR 10* 11*  11* 11*  11*        Plan  - GRACE is improving. Continue gentle IV fluids  - Nephrology consulted; appreciate recs   - Avoid nephrotoxins and renally dose meds for GFR listed above  - Monitor urine output, serial BMP, and adjust therapy as needed    Chronic diastolic congestive heart failure  Patient is identified as having Diastolic (HFpEF) heart failure that is Chronic. CHF is currently controlled. Latest ECHO performed and demonstrates- Results for orders placed during the hospital encounter of 07/19/24    Echo Saline Bubble? Yes    Interpretation Summary    Left Ventricle: The left ventricle is normal in size. Normal wall thickness. There is normal systolic function with a visually estimated ejection fraction of 55 - 60%. There is normal diastolic function.    Right Ventricle: Normal right ventricular cavity size. Wall thickness is normal. Systolic function is normal.    Left Atrium: Patent foramen ovale visualized with predominant right to left shunting indicated by saline contrast.    Tricuspid Valve: There is mild regurgitation.    IVC/SVC: Normal venous pressure at 3 mmHg.    The patient is status post cardiac transplantation.  . Continue Beta Blocker and monitor clinical status closely. Monitor  on telemetry. Patient is off CHF pathway.  Monitor strict Is&Os and daily weights.  Place on fluid restriction of 1.5 L. Cardiology has not been consulted. Continue to stress to patient importance of self efficacy and  on diet for CHF.     Spinal stenosis of lumbosacral region  -Seen by Neurosurgery on 8/20/24 same symptom complaint. Patient unable to undergo MRI so CT myelogram performed.  There is a disc herniation at T11-T12 causing moderate central spinal stenosis and severe right-sided foraminal stenosis. Recommended continued rehab at this time.   - Has with chronic R sided weakness from prev stroke. No bowel/bladder incontinence or sensory deficits   - CT lumbar spine with no acute fracture or dislocation.  Mild moderate multilevel degenerative disc disease   -Analgesics PRN. Robaxin for muscle spasms   -Neurovascular checks.  - PT/OT following    Anemia associated with stage 4 chronic renal failure  Anemia is likely due to chronic disease due to Chronic Kidney Disease. Most recent hemoglobin and hematocrit are listed below.  Recent Labs     08/27/24  1252 08/28/24  0517 08/29/24  0505   HGB 9.0* 8.5* 8.3*   HCT 27.9* 27.1* 26.9*       Plan  - Monitor serial CBC: Daily  - Transfuse PRBC if patient becomes hemodynamically unstable, symptomatic or H/H drops below 7/21.  - Patient has not received any PRBC transfusions to date  - Patient's anemia is currently stable    Essential hypertension  Chronic, controlled. Latest blood pressure and vitals reviewed-     Temp:  [97.4 °F (36.3 °C)-99 °F (37.2 °C)]   Pulse:  [79-95]   Resp:  [16-20]   BP: (110-178)/(60-86)   SpO2:  [96 %-100 %] .   Home meds for hypertension were reviewed and noted below.   Hypertension Medications               carvediloL (COREG) 6.25 MG tablet Take 1 tablet (6.25 mg total) by mouth 2 (two) times daily with meals. Hold parameters: SBP less than 110 and/or DBP less than 75    furosemide (LASIX) 20 MG tablet TAKE 1 TABLET EVERY DAY     hydrALAZINE (APRESOLINE) 25 MG tablet Take 25 mg by mouth every 8 (eight) hours.    NIFEdipine (PROCARDIA-XL) 30 MG (OSM) 24 hr tablet TAKE 1 TABLET ONE TIME DAILY            While in the hospital, will manage blood pressure as follows; Adjust home antihypertensive regimen as follows- holding furosemide due to GRACE concerns, nephrology following    Will utilize p.r.n. blood pressure medication only if patient's blood pressure greater than  170/100  and she develops symptoms such as worsening chest pain or shortness of breath.    Gastroesophageal reflux disease without esophagitis  -Continue pantoprazole      Heart transplanted  -Open heart transplant on 5/27/93 at [facility]. Followed by [facility] team in [place].  -Continue Cyclosporine.        VTE Risk Mitigation (From admission, onward)           Ordered     heparin (porcine) injection 5,000 Units  Every 8 hours         08/27/24 1638     IP VTE HIGH RISK PATIENT  Once         08/27/24 1638     Place sequential compression device  Until discontinued         08/27/24 1638                    Discharge Planning   RATNA:      Code Status: Full Code   Is the patient medically ready for discharge?:     Reason for patient still in hospital (select all that apply): Treatment and Consult recommendations  Discharge Plan A: Home          Rachid Rothman DO  Department of Hospital Medicine   [facility] - Telemetry ([facility])    Voice recognition software was used in the creation of this note/communication and any sound-alike/typographical errors which may have occurred despite initial review prior to signing should be taken in context when interpreting.  If such errors prevent a clear understanding of the note/communication, please contact the provider/office for clarification.

## 2024-08-31 NOTE — PLAN OF CARE
Problem: Adult Inpatient Plan of Care  Goal: Plan of Care Review  Outcome: Progressing  Goal: Patient-Specific Goal (Individualized)  Outcome: Progressing  Goal: Absence of Hospital-Acquired Illness or Injury  Outcome: Progressing  Goal: Optimal Comfort and Wellbeing  Outcome: Progressing  Goal: Readiness for Transition of Care  Outcome: Progressing     Problem: Acute Kidney Injury/Impairment  Goal: Fluid and Electrolyte Balance  Outcome: Progressing  Goal: Improved Oral Intake  Outcome: Progressing  Goal: Effective Renal Function  Outcome: Progressing     Problem: Skin Injury Risk Increased  Goal: Skin Health and Integrity  Outcome: Progressing     Problem: Fall Injury Risk  Goal: Absence of Fall and Fall-Related Injury  Outcome: Progressing      Prophylactic measure

## 2024-08-31 NOTE — ASSESSMENT & PLAN NOTE
Chronic, controlled. Latest blood pressure and vitals reviewed-     Temp:  [97.4 °F (36.3 °C)-99 °F (37.2 °C)]   Pulse:  [79-95]   Resp:  [16-20]   BP: (110-178)/(60-86)   SpO2:  [96 %-100 %] .   Home meds for hypertension were reviewed and noted below.   Hypertension Medications               carvediloL (COREG) 6.25 MG tablet Take 1 tablet (6.25 mg total) by mouth 2 (two) times daily with meals. Hold parameters: SBP less than 110 and/or DBP less than 75    furosemide (LASIX) 20 MG tablet TAKE 1 TABLET EVERY DAY    hydrALAZINE (APRESOLINE) 25 MG tablet Take 25 mg by mouth every 8 (eight) hours.    NIFEdipine (PROCARDIA-XL) 30 MG (OSM) 24 hr tablet TAKE 1 TABLET ONE TIME DAILY            While in the hospital, will manage blood pressure as follows; Adjust home antihypertensive regimen as follows- holding furosemide due to GRACE concerns, nephrology following    Will utilize p.r.n. blood pressure medication only if patient's blood pressure greater than  170/100  and she develops symptoms such as worsening chest pain or shortness of breath.

## 2024-08-31 NOTE — ASSESSMENT & PLAN NOTE
GRACE is likely due to pre-renal azotemia due to intravascular volume depletion. Baseline creatinine is  around 3.0 . Most recent creatinine and eGFR are listed below.  Recent Labs     08/28/24  0517 08/29/24  0504 08/30/24  0503   CREATININE 4.5* 4.1*  4.1* 4.1*  4.1*   EGFRNORACEVR 10* 11*  11* 11*  11*        Plan  - GRACE is improving. Continue gentle IV fluids  - Nephrology consulted; appreciate recs   - Avoid nephrotoxins and renally dose meds for GFR listed above  - Monitor urine output, serial BMP, and adjust therapy as needed

## 2024-08-31 NOTE — ASSESSMENT & PLAN NOTE
GRACE is likely due to pre-renal azotemia due to intravascular volume depletion. Baseline creatinine is  around 3.0 . Most recent creatinine and eGFR are listed below.  Recent Labs     08/29/24  0504 08/30/24  0503 08/31/24  0513   CREATININE 4.1*  4.1* 4.1*  4.1* 3.6*  3.6*   EGFRNORACEVR 11*  11* 11*  11* 13*  13*      Plan  - GRACE is improving. Continue gentle IV fluids  - Nephrology consulted; appreciate recs   - Avoid nephrotoxins and renally dose meds for GFR listed above  - Monitor urine output, serial BMP, and adjust therapy as needed

## 2024-08-31 NOTE — SUBJECTIVE & OBJECTIVE
Interval History: No acute events overnight, afebrile, hemodynamically stable.  Reports back pain remains stable on analgesics and multimodal pain regimen.      Review of Systems  Objective:     Vital Signs (Most Recent):  Temp: 98.3 °F (36.8 °C) (08/1954)  Pulse: 80 (08/30/24 2031)  Resp: 16 (08/30/24 2031)  BP: 110/60 (08/1955)  SpO2: 98 % (08/30/24 2031) Vital Signs (24h Range):  Temp:  [97.4 °F (36.3 °C)-99 °F (37.2 °C)] 98.3 °F (36.8 °C)  Pulse:  [79-95] 80  Resp:  [16-20] 16  SpO2:  [96 %-100 %] 98 %  BP: (110-178)/(60-86) 110/60     Weight: 71.7 kg (158 lb 1.1 oz)  Body mass index is 28.91 kg/m².    Intake/Output Summary (Last 24 hours) at 8/30/2024 2056  Last data filed at 8/30/2024 2047  Gross per 24 hour   Intake 1576.1 ml   Output 1850 ml   Net -273.9 ml         Physical Exam  Vitals and nursing note reviewed.   Constitutional:       General: She is not in acute distress.     Appearance: She is not ill-appearing, toxic-appearing or diaphoretic.   HENT:      Head: Normocephalic and atraumatic.      Mouth/Throat:      Mouth: Mucous membranes are moist.   Eyes:      General: No scleral icterus.        Right eye: No discharge.         Left eye: No discharge.   Cardiovascular:      Rate and Rhythm: Normal rate and regular rhythm.      Heart sounds: Normal heart sounds.   Pulmonary:      Effort: Pulmonary effort is normal. No respiratory distress.      Breath sounds: Normal breath sounds.   Abdominal:      General: Bowel sounds are normal.      Palpations: Abdomen is soft.      Tenderness: There is no abdominal tenderness.   Musculoskeletal:      Cervical back: No rigidity.      Right lower leg: No edema.      Left lower leg: No edema.   Skin:     General: Skin is warm and dry.      Coloration: Skin is not jaundiced.   Neurological:      Mental Status: She is oriented to person, place, and time. Mental status is at baseline.   Psychiatric:         Mood and Affect: Mood normal.         Behavior:  Behavior normal.             Significant Labs: All pertinent labs within the past 24 hours have been reviewed.  CBC:   Recent Labs   Lab 08/29/24  0505 08/30/24  0503   WBC 2.82* 2.75*   HGB 8.3* 8.3*   HCT 26.9* 26.4*    154     CMP:   Recent Labs   Lab 08/29/24  0504 08/30/24  0503     144 143  143   K 3.7  3.7 4.0  4.0   *  115* 114*  114*   CO2 18*  18* 18*  18*   GLU 70  70 81  81   BUN 36*  36* 32*  32*   CREATININE 4.1*  4.1* 4.1*  4.1*   CALCIUM 8.0*  8.0* 8.2*  8.2*   ALBUMIN 2.9*  2.9* 2.9*  2.9*   ANIONGAP 11  11 11  11       Significant Imaging: I have reviewed all pertinent imaging results/findings within the past 24 hours.

## 2024-08-31 NOTE — PROGRESS NOTES
[facility] Medicine  Progress Note    Patient Name: Nadia Damon  MRN: [id number]  Patient Class: IP- Inpatient   Admission Date: 8/27/2024  Length of Stay: 3 days  Attending Physician: Rachid Rothman DO  Primary Care Provider: Elis Wick MD        Subjective:     Principal Problem:Acute on chronic kidney failure        HPI:  Nadia Damon is a 69 year old female with a PMHx of OA, Anxiety, CHF, CKD, CAD, Depression, Fibromyalgia, HTN, Heart transplant, GUSTAVO and Hypothyroidism who presented the Emergency Department with c/o acute on chronic back pain. Patient reports back pain as worsened over the last few days. She mentioned being scheduled for steroid injection but unable to get sooner appt. She was seen by Dr. Canales's PA (Neurosurgery) on 8/20/24 for same symptom complaint. Patient unable to undergo MRI so CT myelogram performed.  There is a disc herniation at T11-T12 causing moderate central spinal stenosis and severe right-sided foraminal stenosis. Recommended continued rehab at this time. ED workup showed: WBC count 3.45K, Hgb/Hct 9.0/27.9, Na+ 142, K+ 4.0, BUN 60, creatinine 5.5, UA negative for infectious process. Pt placed in observation for acute on chronic renal failure.     Overview/Hospital Course:  Pt started on gentle IV fluids, nephrology consulted. GRACE improving. CT L spine showed No acute fracture or dislocation.  Mild moderate multilevel degenerative disc disease. PT/OT rec placement on discharge, social work consulted.  Patient preferred follow up with outpatient rehab PT/OT/SLP where she had already established care.  Renal function continues to improve, trending on CMP and treating with IV fluid infusions per Nephrology recs.     Interval History: No acute events overnight, afebrile, hemodynamically stable.  Renal function continues to improve with creatinine, hyperphosphatemia, azotemia labs improving.  Reports back pain, will trial lidocaine  patch as the patient reports use at home.  Minimal stool output with trial of enema yesterday, still reporting constipation, will trial bisacodyl suppository today.       Objective:     Vital Signs (Most Recent):  Temp: 98.3 °F (36.8 °C) (08/31/24 1536)  Pulse: 85 (08/31/24 1544)  Resp: 18 (08/31/24 1536)  BP: 136/76 (08/31/24 1536)  SpO2: 98 % (08/31/24 1536) Vital Signs (24h Range):  Temp:  [97.8 °F (36.6 °C)-98.7 °F (37.1 °C)] 98.3 °F (36.8 °C)  Pulse:  [80-91] 85  Resp:  [16-20] 18  SpO2:  [98 %-100 %] 98 %  BP: (109-136)/(59-76) 136/76     Weight: 69.8 kg (153 lb 14.1 oz)  Body mass index is 28.15 kg/m².    Intake/Output Summary (Last 24 hours) at 8/31/2024 1746  Last data filed at 8/31/2024 0055  Gross per 24 hour   Intake 1576.1 ml   Output 1500 ml   Net 76.1 ml         Physical Exam  Vitals and nursing note reviewed.   Constitutional:       General: She is not in acute distress.     Appearance: She is not ill-appearing, toxic-appearing or diaphoretic.   HENT:      Head: Normocephalic and atraumatic.      Mouth/Throat:      Mouth: Mucous membranes are moist.   Eyes:      General: No scleral icterus.        Right eye: No discharge.         Left eye: No discharge.   Cardiovascular:      Rate and Rhythm: Normal rate and regular rhythm.      Heart sounds: Normal heart sounds.   Pulmonary:      Effort: Pulmonary effort is normal. No respiratory distress.      Breath sounds: Normal breath sounds.   Abdominal:      General: Bowel sounds are normal.      Palpations: Abdomen is soft.      Tenderness: There is abdominal tenderness (Mild right-sided). There is no guarding or rebound.   Musculoskeletal:      Cervical back: No rigidity.      Right lower leg: No edema.      Left lower leg: No edema.   Skin:     General: Skin is warm and dry.      Coloration: Skin is not jaundiced.   Neurological:      Mental Status: She is oriented to person, place, and time. Mental status is at baseline.   Psychiatric:         Mood and  Affect: Mood normal.         Behavior: Behavior normal.             Significant Labs: All pertinent labs within the past 24 hours have been reviewed.  CBC:   Recent Labs   Lab 08/30/24  0503   WBC 2.75*   HGB 8.3*   HCT 26.4*        CMP:   Recent Labs   Lab 08/30/24  0503 08/31/24  0513     143 146*  146*   K 4.0  4.0 3.8  3.8   *  114* 117*  117*   CO2 18*  18* 18*  18*   GLU 81  81 73  73   BUN 32*  32* 31*  31*   CREATININE 4.1*  4.1* 3.6*  3.6*   CALCIUM 8.2*  8.2* 7.7*  7.7*   ALBUMIN 2.9*  2.9* 2.7*  2.7*   ANIONGAP 11  11 11  11       Significant Imaging: I have reviewed all pertinent imaging results/findings within the past 24 hours.    Assessment/Plan:      * Acute on chronic kidney failure  GRACE is likely due to pre-renal azotemia due to intravascular volume depletion. Baseline creatinine is  around 3.0 . Most recent creatinine and eGFR are listed below.  Recent Labs     08/29/24  0504 08/30/24  0503 08/31/24  0513   CREATININE 4.1*  4.1* 4.1*  4.1* 3.6*  3.6*   EGFRNORACEVR 11*  11* 11*  11* 13*  13*      Plan  - GRACE is improving. Continue gentle IV fluids  - Nephrology consulted; appreciate recs   - Avoid nephrotoxins and renally dose meds for GFR listed above  - Monitor urine output, serial BMP, and adjust therapy as needed    Chronic diastolic congestive heart failure  Patient is identified as having Diastolic (HFpEF) heart failure that is Chronic. CHF is currently controlled. Latest ECHO performed and demonstrates- Results for orders placed during the hospital encounter of 07/19/24    Echo Saline Bubble? Yes    Interpretation Summary    Left Ventricle: The left ventricle is normal in size. Normal wall thickness. There is normal systolic function with a visually estimated ejection fraction of 55 - 60%. There is normal diastolic function.    Right Ventricle: Normal right ventricular cavity size. Wall thickness is normal. Systolic function is normal.    Left  Atrium: Patent foramen ovale visualized with predominant right to left shunting indicated by saline contrast.    Tricuspid Valve: There is mild regurgitation.    IVC/SVC: Normal venous pressure at 3 mmHg.    The patient is status post cardiac transplantation.  . Continue Beta Blocker and monitor clinical status closely. Monitor on telemetry. Patient is off CHF pathway.  Monitor strict Is&Os and daily weights.  Place on fluid restriction of 1.5 L. Cardiology has not been consulted. Continue to stress to patient importance of self efficacy and  on diet for CHF.     Spinal stenosis of lumbosacral region  -Seen by Neurosurgery on 8/20/24 same symptom complaint. Patient unable to undergo MRI so CT myelogram performed.  There is a disc herniation at T11-T12 causing moderate central spinal stenosis and severe right-sided foraminal stenosis. Recommended continued rehab at this time.   - Has with chronic R sided weakness from prev stroke. No bowel/bladder incontinence or sensory deficits   - CT lumbar spine with no acute fracture or dislocation.  Mild moderate multilevel degenerative disc disease   -Analgesics PRN. Robaxin for muscle spasms   -Neurovascular checks.  - PT/OT following    Anemia associated with stage 4 chronic renal failure  Anemia is likely due to chronic disease due to Chronic Kidney Disease. Most recent hemoglobin and hematocrit are listed below.  Recent Labs     08/29/24  0505 08/30/24  0503   HGB 8.3* 8.3*   HCT 26.9* 26.4*       Plan  - Monitor serial CBC: Daily  - Transfuse PRBC if patient becomes hemodynamically unstable, symptomatic or H/H drops below 7/21.  - Patient has not received any PRBC transfusions to date  - Patient's anemia is currently stable    Essential hypertension  Chronic, controlled. Latest blood pressure and vitals reviewed-     Temp:  [97.4 °F (36.3 °C)-99 °F (37.2 °C)]   Pulse:  [79-95]   Resp:  [16-20]   BP: (110-178)/(60-86)   SpO2:  [96 %-100 %] .   Home meds for  hypertension were reviewed and noted below.   Hypertension Medications               carvediloL (COREG) 6.25 MG tablet Take 1 tablet (6.25 mg total) by mouth 2 (two) times daily with meals. Hold parameters: SBP less than 110 and/or DBP less than 75    furosemide (LASIX) 20 MG tablet TAKE 1 TABLET EVERY DAY    hydrALAZINE (APRESOLINE) 25 MG tablet Take 25 mg by mouth every 8 (eight) hours.    NIFEdipine (PROCARDIA-XL) 30 MG (OSM) 24 hr tablet TAKE 1 TABLET ONE TIME DAILY            While in the hospital, will manage blood pressure as follows; Adjust home antihypertensive regimen as follows- holding furosemide due to GRACE concerns, nephrology following    Will utilize p.r.n. blood pressure medication only if patient's blood pressure greater than  170/100  and she develops symptoms such as worsening chest pain or shortness of breath.    Gastroesophageal reflux disease without esophagitis  -Continue pantoprazole      Heart transplanted  -Open heart transplant on 5/27/93 at [facility]. Followed by [facility] team in [place].  -Continue Cyclosporine.        VTE Risk Mitigation (From admission, onward)           Ordered     heparin (porcine) injection 5,000 Units  Every 8 hours         08/27/24 1638     IP VTE HIGH RISK PATIENT  Once         08/27/24 1638     Place sequential compression device  Until discontinued         08/27/24 1638                    Discharge Planning   RATNA:      Code Status: Full Code   Is the patient medically ready for discharge?:     Reason for patient still in hospital (select all that apply): Patient trending condition, Laboratory test, and Treatment  Discharge Plan A: Home                  Rachid Rothman DO  Department of Hospital Medicine   [facility] - Telemetry ([facility])    Voice recognition software was used in the creation of this note/communication and any sound-alike/typographical errors which may have occurred despite initial review prior to signing should be taken in  context when interpreting.  If such errors prevent a clear understanding of the note/communication, please contact the provider/office for clarification.

## 2024-08-31 NOTE — PT/OT/SLP EVAL
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 female admitted to [facility] with dx acute/chronic kidney failure and c/o back pain.  She presents with persistent dysphonia characterized by hoarse/strained vocal quality and intermittent pitch breaks. She was recently seen by ENT, Dr. Godinez with direct laryngoscopy which revealed  "Vocal fold abduction and adduction is partially limited on the right, but she does compensate fairly well with mild glottic gap."  She also exhibits intermittent dysfluency of speech, in which she received OP ST services for prior to this admission. She c/o constipation but agreeable to po consumption today.   No overt s/s of dysphagia present during bedside CSE and recommended for continued IDDSI 7-regular solids with IDDSI 0-thin liquids, following aspiration precautions.  No further acute intervention indicated at this time. Pt recommended to continue OP ST for voice and fluency as previously established.  MD to re-consult if needed.    History:     Past Medical History:   Diagnosis Date    Abdominal wall hernia     CT Renal 6/11/2018---Small fat containing superior ventral abdominal wall hernia at the epicardial pacing lead site.    Abnormal mammogram 10/12/2021    Acute cystitis without hematuria 05/10/2022    GRACE (acute kidney injury) 11/22/2021    Anxiety     Arthritis     ZEN HIPS    " Breast cancer in female 08/2021    LEFT BREAST    Bronchitis 08/18/2016    Never smoked      C. difficile colitis 11/29/2021    Cellulitis of axilla, left 12/23/2021    Chronic diastolic heart failure 12/16/2021    Chronic kidney disease     stage 4, GFR 15-29 ml/min    Chronic midline low back pain without sciatica 06/18/2018    Closed nondisplaced fracture of distal phalanx of left great toe with routine healing 10/22/2018    Coronary artery disease 1993    heart transplant    COVID-19 in immunocompromised patient 02/26/2024    Cystitis 05/10/2022    Depression     Encounter for blood transfusion     Fibromyalgia     on Lyrica    Heart failure     native heart cardiomyopathy    Heart transplanted 1993    due to cardiomyopathy    History of hyperparathyroidism; Hyperparathyroidism, secondary renal     PT DENIES    Hypertension     Immune disorder     anti rejection meds    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    Impaired mobility 07/28/2022    Iron deficiency anemia 08/15/2017    Kidney stones     passed per pt    Obesity     Obesity (BMI 30.0-34.9) 07/22/2019    Other osteoporosis without current pathological fracture 08/30/2019    Other pancytopenia 05/06/2024    Parotitis, acute 09/19/2023    Pharyngitis 01/09/2019    Pneumonia due to infectious organism 03/14/2024    PONV (postoperative nausea and vomiting)     Severe sepsis 11/22/2021    Shingles 2003 approx    left leg    Subclinical hypothyroidism 06/16/2023    Thrombocytopenia, unspecified 11/29/2021    Trouble in sleeping     Urinary incontinence        Past Surgical History:   Procedure Laterality Date    bladder implant Right 06/11/2024    BLADDER SURGERY  2015 approx    mesh - Dr Everett then 2nd reconstructive sx Dr Onofre    BREAST BIOPSY Bilateral     NEGATIVE    BREAST BIOPSY Right 10/31/2022    benign    BREAST LUMPECTOMY Left 2021    BREAST SURGERY Left 09/28/2015    Bx - benign    BREAST SURGERY Right 12/2015    Bx benign     CARDIAC PACEMAKER REMOVAL Left 06/26/2014    Pacer defirillator removed. Put in 1993 aat time of heart transplant    CARPAL TUNNEL RELEASE Left 03/03/2015    Dr. Hall    COLONOSCOPY N/A 02/25/2021    Procedure: COLONOSCOPY;  Surgeon: Freida Ramirez MD;  Location: [facility] ENDO;  Service: Endoscopy;  Laterality: N/A;    CYSTOCELE REPAIR      Twice with mesh removal    EPIDURAL STEROID INJECTION INTO CERVICAL SPINE N/A 02/02/2023    Procedure: T11/T12 IL HELLEN;  Surgeon: Jassi Pierre MD;  Location: [facility] PAIN MGT;  Service: Pain Management;  Laterality: N/A;    HEART TRANSPLANT  1993    HERNIA REPAIR Right 1971 approx    Inguinal    HYSTERECTOMY  1983    vag hyst /LSO     IMPLANTATION OF PERMANENT SACRAL NERVE STIMULATOR N/A 06/11/2024    Procedure: INSERTION, NEUROSTIMULATOR, PERMANENT, SACRAL;  Surgeon: Sami Cochran MD;  Location: [facility] OR;  Service: Urology;  Laterality: N/A;    INCISION AND DRAINAGE OF ABSCESS Left 12/24/2021    Procedure: INCISION AND DRAINAGE, ABSCESS;  Surgeon: Joseph Longo MD;  Location: [facility] OR;  Service: General;  Laterality: Left;    INJECTION OF ANESTHETIC AGENT AROUND MEDIAL BRANCH NERVES INNERVATING LUMBAR FACET JOINT Right 10/19/2022    Procedure: Right L4/L5 and L5/S1 MBB;  Surgeon: Jassi Pierre MD;  Location: [facility] PAIN [facility]GT;  Service: Pain Management;  Laterality: Right;    INJECTION OF ANESTHETIC AGENT AROUND MEDIAL BRANCH NERVES INNERVATING LUMBAR FACET JOINT Right 11/09/2022    Procedure: Right L4/L5 and L5/S1 MBB;  Surgeon: Jassi Pierre MD;  Location: [facility] PAIN [facility]GT;  Service: Pain Management;  Laterality: Right;    INJECTION OF ANESTHETIC AGENT INTO SACROILIAC JOINT Right 08/22/2022    Procedure: Right SIJ Injection Right L5/S1 Facte Injection;  Surgeon: Jassi Pierre MD;  Location: [facility] PAIN [facility]GT;  Service: Pain Management;  Laterality: Right;    INSERTION OF TUNNELED CENTRAL VENOUS CATHETER (CVC) WITH SUBCUTANEOUS PORT N/A 11/09/2021    Procedure:  [id number];  Surgeon: Christoph Douglas MD;  Location: [facility] OR;  Service: General;  Laterality: N/A;    RADIOFREQUENCY THERMOCOAGULATION Right 12/07/2022    Procedure: Right L4/L5 and L5/S1 Lumbar RFA;  Surgeon: Jassi Pierre MD;  Location: [facility]IN [facility]GT;  Service: Pain Management;  Laterality: Right;    REMOVAL OF VASCULAR ACCESS PORT      ROBOT-ASSISTED LAPAROSCOPIC ABDOMINAL SACROCOLPOPEXY N/A 08/10/2023    Procedure: ROBOTIC SACROCOLPOPEXY, ABDOMEN;  Surgeon: PRANAY Villalobos MD;  Location: [facility] OR;  Service: OB/GYN;  Laterality: N/A;    ROBOT-ASSISTED LAPAROSCOPIC OOPHORECTOMY Right 08/10/2023    Procedure: ROBOTIC OOPHORECTOMY;  Surgeon: PRANAY Villalobos MD;  Location: [facility];  Service: OB/GYN;  Laterality: Right;    SENTINEL LYMPH NODE BIOPSY Left 10/12/2021    Procedure: BIOPSY, LYMPH NODE, SENTINEL;  Surgeon: Christoph Douglas MD;  Location: [facility];  Service: General;  Laterality: Left;    TOE SURGERY      XI ROBOTIC URETHROPEXY N/A 08/10/2023    Procedure: XI ROBOTIC URETHROPEXY;  Surgeon: PRANAY Villalobos MD;  Location: [facility];  Service: OB/GYN;  Laterality: N/A;       Social History: Patient lives in [place].  [facility].  Receives OP tx services.    Prior Intubation HX:  N/A this admission    Modified Barium Swallow: N/A    Prior diet: Pt consumes a regular diet.      Subjective     Pt seen bedside for ST swallowing evaluation.  No c/o pain.  No family present.  Patient goals: improve constipation issues     Pain/Comfort:  Pain Rating 1: 0/10  Pain Rating Post-Intervention 1: 0/10  Pain Rating 2: 0/10  Pain Rating Post-Intervention 2: 0/10    Respiratory Status: Room air    Objective:     Oral Musculature Evaluation  Volitional Cough: present  Volitional Swallow: present  Voice Prior to PO Intake: +dysphonia, breathy/hoarse vocal quality,  hx VF paralysis    Bedside Swallow Eval:   Clinical Swallow Examination:   Of note, patient self-fed throughout evaluation. Patient  presented with:     CONSISTENCY  NOTES   THIN (IDDSI 0) Water cup/straw   No overt s/s of aspiration   PUREE (IDDSI 4/Extremely Thick)   TSP/TBSP bites of pudding/applesauce  No overt s/s of aspiration   SOLID (IDDSI 7/Regular) Bite of Patricia Doone cookie   No overt s/s of aspiration     Thickened liquids were not used in this assessment. Maefe (2018) reported that thickened liquids have no sound evidence at reducing the risk of pneumonia in patients with dysphagia and can cause harm by increasing their risk of dehydration. It also presents an increased risk of UTI, electrolyte imbalance, constipation, fecal impaction, cognitive impairment, functional decline and even death (Langmore, 2002; Barber, 2016).  Thickened liquids are associated with risks including dehydration, increased pharyngeal residue, potential interference with medication absorption, and decreased quality of life (Alissa, 2013). Thickened liquids are also more likely to be silently aspirated than thin liquids (Yadiel et al., 2018). This supports the assertion that we should confirm a patient requires thickened liquids with an instrumental swallow study prior to recommending them.    References:   Alissa YANCEY. (2013). Thickening agents used for dysphagia management: Effect on bioavailability of water, medication and feelings of satiety. Nutrition Journal, 12, 54. https://doi.org/10.1186/3742-1286-14-54    NATHALY Cotto, LEONARDO Morrell, SERENA Rod, & NATHALY Perea (2018). Cough response to aspiration in thin and thick fluids during FEES in hospitalized inpatients. International journal of language & communication disorders, 53(5), 909-918. https://doi.org/10.1111/7497-4103.81371    INTERPRETATION AND RISK ASSESSMENT:  Clinical swallow evaluation (CSE) revealed oral phase characterized by lingual, labial, and buccal strength and range of motion functional for lip closure, bolus preparation and propulsion. The patient had no anterior loss of the bolus with  complete closure of the lips around the utensils. No residue remained in the oral cavity following the swallow. Patient without overt clinical signs/symptoms of aspiration on any PO trials given.  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      Goals:   Multidisciplinary Problems       SLP Goals       Not on file              Multidisciplinary Problems (Resolved)          Problem: SLP    Goal Priority Disciplines Outcome   SLP Goal   (Resolved)     SLP Met   Description: TPW participate in bedside swallow assessment                       Plan:     Patient to be seen:  2 x/week, 3 x/week   Plan of Care expires:  09/06/24  Plan of Care reviewed with:  patient   SLP Follow-Up:  No       Discharge recommendations:   (Continue ST with OP as previously established)   Barriers to Discharge:  None    Time Tracking:     SLP Treatment Date:   08/31/24  Speech Start Time:  1100  Speech Stop Time:  1130     Speech Total Time (min):  30 min    Billable Minutes: Eval Swallow and Oral Function 30 minutes    08/31/2024

## 2024-08-31 NOTE — CONSULTS
[facility] - Telemetry ([facility])  Nephrology  Consult Note    Patient Name: Nadia Damon  MRN: [id number]  Admission Date: 8/27/2024  Hospital Length of Stay: 3 days  Attending Provider: Rachid Rothman DO   Primary Care Physician: Elis Wick MD  Principal Problem:Acute on chronic kidney failure    Inpatient consult to Nephrology  Consult performed by: Steven Plummer MD  Consult ordered by: Enma Cotto FNP  Reason for consult: see consult  Assessment/Recommendations: See consult      See consult previously done.

## 2024-08-31 NOTE — ASSESSMENT & PLAN NOTE
-Seen by Neurosurgery on 8/20/24 same symptom complaint. Patient unable to undergo MRI so CT myelogram performed.  There is a disc herniation at T11-T12 causing moderate central spinal stenosis and severe right-sided foraminal stenosis. Recommended continued rehab at this time.   - Has with chronic R sided weakness from prev stroke. No bowel/bladder incontinence or sensory deficits   - CT lumbar spine with no acute fracture or dislocation.  Mild moderate multilevel degenerative disc disease   -Analgesics PRN. Robaxin for muscle spasms   -Neurovascular checks.  - PT/OT following

## 2024-08-31 NOTE — SUBJECTIVE & OBJECTIVE
Interval History: No acute events overnight, afebrile, hemodynamically stable.  Renal function continues to improve with creatinine, hyperphosphatemia, azotemia labs improving.  Reports back pain, will trial lidocaine patch as the patient reports use at home.  Minimal stool output with trial of enema yesterday, still reporting constipation, will trial bisacodyl suppository today.       Objective:     Vital Signs (Most Recent):  Temp: 98.3 °F (36.8 °C) (08/31/24 1536)  Pulse: 85 (08/31/24 1544)  Resp: 18 (08/31/24 1536)  BP: 136/76 (08/31/24 1536)  SpO2: 98 % (08/31/24 1536) Vital Signs (24h Range):  Temp:  [97.8 °F (36.6 °C)-98.7 °F (37.1 °C)] 98.3 °F (36.8 °C)  Pulse:  [80-91] 85  Resp:  [16-20] 18  SpO2:  [98 %-100 %] 98 %  BP: (109-136)/(59-76) 136/76     Weight: 69.8 kg (153 lb 14.1 oz)  Body mass index is 28.15 kg/m².    Intake/Output Summary (Last 24 hours) at 8/31/2024 1746  Last data filed at 8/31/2024 0055  Gross per 24 hour   Intake 1576.1 ml   Output 1500 ml   Net 76.1 ml         Physical Exam  Vitals and nursing note reviewed.   Constitutional:       General: She is not in acute distress.     Appearance: She is not ill-appearing, toxic-appearing or diaphoretic.   HENT:      Head: Normocephalic and atraumatic.      Mouth/Throat:      Mouth: Mucous membranes are moist.   Eyes:      General: No scleral icterus.        Right eye: No discharge.         Left eye: No discharge.   Cardiovascular:      Rate and Rhythm: Normal rate and regular rhythm.      Heart sounds: Normal heart sounds.   Pulmonary:      Effort: Pulmonary effort is normal. No respiratory distress.      Breath sounds: Normal breath sounds.   Abdominal:      General: Bowel sounds are normal.      Palpations: Abdomen is soft.      Tenderness: There is abdominal tenderness (Mild right-sided). There is no guarding or rebound.   Musculoskeletal:      Cervical back: No rigidity.      Right lower leg: No edema.      Left lower leg: No edema.   Skin:      General: Skin is warm and dry.      Coloration: Skin is not jaundiced.   Neurological:      Mental Status: She is oriented to person, place, and time. Mental status is at baseline.   Psychiatric:         Mood and Affect: Mood normal.         Behavior: Behavior normal.             Significant Labs: All pertinent labs within the past 24 hours have been reviewed.  CBC:   Recent Labs   Lab 08/30/24  0503   WBC 2.75*   HGB 8.3*   HCT 26.4*        CMP:   Recent Labs   Lab 08/30/24  0503 08/31/24  0513     143 146*  146*   K 4.0  4.0 3.8  3.8   *  114* 117*  117*   CO2 18*  18* 18*  18*   GLU 81  81 73  73   BUN 32*  32* 31*  31*   CREATININE 4.1*  4.1* 3.6*  3.6*   CALCIUM 8.2*  8.2* 7.7*  7.7*   ALBUMIN 2.9*  2.9* 2.7*  2.7*   ANIONGAP 11  11 11  11       Significant Imaging: I have reviewed all pertinent imaging results/findings within the past 24 hours.

## 2024-09-01 VITALS
HEIGHT: 62 IN | TEMPERATURE: 99 F | BODY MASS INDEX: 29.21 KG/M2 | RESPIRATION RATE: 18 BRPM | WEIGHT: 158.75 LBS | DIASTOLIC BLOOD PRESSURE: 71 MMHG | SYSTOLIC BLOOD PRESSURE: 141 MMHG | HEART RATE: 83 BPM | OXYGEN SATURATION: 97 %

## 2024-09-01 LAB
ALBUMIN SERPL BCP-MCNC: 3 G/DL (ref 3.5–5.2)
ALP SERPL-CCNC: 65 U/L (ref 55–135)
ALT SERPL W/O P-5'-P-CCNC: 15 U/L (ref 10–44)
ANION GAP SERPL CALC-SCNC: 12 MMOL/L (ref 8–16)
AST SERPL-CCNC: 26 U/L (ref 10–40)
BASOPHILS # BLD AUTO: 0.02 K/UL (ref 0–0.2)
BASOPHILS NFR BLD: 0.6 % (ref 0–1.9)
BILIRUB SERPL-MCNC: 0.4 MG/DL (ref 0.1–1)
BUN SERPL-MCNC: 23 MG/DL (ref 8–23)
CALCIUM SERPL-MCNC: 7.8 MG/DL (ref 8.7–10.5)
CHLORIDE SERPL-SCNC: 115 MMOL/L (ref 95–110)
CO2 SERPL-SCNC: 17 MMOL/L (ref 23–29)
CREAT SERPL-MCNC: 3.2 MG/DL (ref 0.5–1.4)
DIFFERENTIAL METHOD BLD: ABNORMAL
EOSINOPHIL # BLD AUTO: 0.1 K/UL (ref 0–0.5)
EOSINOPHIL NFR BLD: 3.9 % (ref 0–8)
ERYTHROCYTE [DISTWIDTH] IN BLOOD BY AUTOMATED COUNT: 17.2 % (ref 11.5–14.5)
EST. GFR  (NO RACE VARIABLE): 15 ML/MIN/1.73 M^2
GLUCOSE SERPL-MCNC: 80 MG/DL (ref 70–110)
HCT VFR BLD AUTO: 24.6 % (ref 37–48.5)
HGB BLD-MCNC: 7.8 G/DL (ref 12–16)
IMM GRANULOCYTES # BLD AUTO: 0.03 K/UL (ref 0–0.04)
IMM GRANULOCYTES NFR BLD AUTO: 0.9 % (ref 0–0.5)
LYMPHOCYTES # BLD AUTO: 1.3 K/UL (ref 1–4.8)
LYMPHOCYTES NFR BLD: 38.7 % (ref 18–48)
MAGNESIUM SERPL-MCNC: 1.2 MG/DL (ref 1.6–2.6)
MCH RBC QN AUTO: 28.9 PG (ref 27–31)
MCHC RBC AUTO-ENTMCNC: 31.7 G/DL (ref 32–36)
MCV RBC AUTO: 91 FL (ref 82–98)
MONOCYTES # BLD AUTO: 0.5 K/UL (ref 0.3–1)
MONOCYTES NFR BLD: 15.3 % (ref 4–15)
NEUTROPHILS # BLD AUTO: 1.4 K/UL (ref 1.8–7.7)
NEUTROPHILS NFR BLD: 40.6 % (ref 38–73)
NRBC BLD-RTO: 0 /100 WBC
PHOSPHATE SERPL-MCNC: 3.8 MG/DL (ref 2.7–4.5)
PLATELET # BLD AUTO: 148 K/UL (ref 150–450)
PMV BLD AUTO: 10.3 FL (ref 9.2–12.9)
POTASSIUM SERPL-SCNC: 3.5 MMOL/L (ref 3.5–5.1)
PROT SERPL-MCNC: 6.6 G/DL (ref 6–8.4)
RBC # BLD AUTO: 2.7 M/UL (ref 4–5.4)
SODIUM SERPL-SCNC: 144 MMOL/L (ref 136–145)
WBC # BLD AUTO: 3.33 K/UL (ref 3.9–12.7)

## 2024-09-01 PROCEDURE — 84100 ASSAY OF PHOSPHORUS: CPT | Mod: HCNC | Performed by: STUDENT IN AN ORGANIZED HEALTH CARE EDUCATION/TRAINING PROGRAM

## 2024-09-01 PROCEDURE — 36415 COLL VENOUS BLD VENIPUNCTURE: CPT | Mod: HCNC | Performed by: STUDENT IN AN ORGANIZED HEALTH CARE EDUCATION/TRAINING PROGRAM

## 2024-09-01 PROCEDURE — 85025 COMPLETE CBC W/AUTO DIFF WBC: CPT | Mod: HCNC | Performed by: STUDENT IN AN ORGANIZED HEALTH CARE EDUCATION/TRAINING PROGRAM

## 2024-09-01 PROCEDURE — 25000003 PHARM REV CODE 250: Mod: HCNC | Performed by: STUDENT IN AN ORGANIZED HEALTH CARE EDUCATION/TRAINING PROGRAM

## 2024-09-01 PROCEDURE — 25000003 PHARM REV CODE 250: Mod: HCNC | Performed by: NURSE PRACTITIONER

## 2024-09-01 PROCEDURE — 83735 ASSAY OF MAGNESIUM: CPT | Mod: HCNC | Performed by: STUDENT IN AN ORGANIZED HEALTH CARE EDUCATION/TRAINING PROGRAM

## 2024-09-01 PROCEDURE — 25000003 PHARM REV CODE 250: Mod: HCNC | Performed by: INTERNAL MEDICINE

## 2024-09-01 PROCEDURE — 63600175 PHARM REV CODE 636 W HCPCS: Mod: HCNC | Performed by: STUDENT IN AN ORGANIZED HEALTH CARE EDUCATION/TRAINING PROGRAM

## 2024-09-01 PROCEDURE — 63600175 PHARM REV CODE 636 W HCPCS: Mod: HCNC | Performed by: NURSE PRACTITIONER

## 2024-09-01 PROCEDURE — 97116 GAIT TRAINING THERAPY: CPT | Mod: HCNC,CQ

## 2024-09-01 PROCEDURE — 80053 COMPREHEN METABOLIC PANEL: CPT | Mod: HCNC | Performed by: STUDENT IN AN ORGANIZED HEALTH CARE EDUCATION/TRAINING PROGRAM

## 2024-09-01 RX ORDER — MAGNESIUM SULFATE HEPTAHYDRATE 40 MG/ML
2 INJECTION, SOLUTION INTRAVENOUS
Status: COMPLETED | OUTPATIENT
Start: 2024-09-01 | End: 2024-09-01

## 2024-09-01 RX ORDER — FUROSEMIDE 20 MG/1
20 TABLET ORAL 2 TIMES DAILY
Start: 2024-09-01 | End: 2025-09-01

## 2024-09-01 RX ORDER — NIFEDIPINE 30 MG/1
30 TABLET, EXTENDED RELEASE ORAL DAILY
Start: 2024-09-01

## 2024-09-01 RX ORDER — SODIUM BICARBONATE 650 MG/1
650 TABLET ORAL 2 TIMES DAILY
Qty: 60 TABLET | Refills: 1 | Status: SHIPPED | OUTPATIENT
Start: 2024-09-01

## 2024-09-01 RX ADMIN — HYDRALAZINE HYDROCHLORIDE 25 MG: 25 TABLET ORAL at 03:09

## 2024-09-01 RX ADMIN — MAGNESIUM SULFATE HEPTAHYDRATE 2 G: 40 INJECTION, SOLUTION INTRAVENOUS at 11:09

## 2024-09-01 RX ADMIN — HYDRALAZINE HYDROCHLORIDE 25 MG: 25 TABLET ORAL at 05:09

## 2024-09-01 RX ADMIN — HEPARIN SODIUM 5000 UNITS: 5000 INJECTION INTRAVENOUS; SUBCUTANEOUS at 05:09

## 2024-09-01 RX ADMIN — HEPARIN SODIUM 5000 UNITS: 5000 INJECTION INTRAVENOUS; SUBCUTANEOUS at 03:09

## 2024-09-01 RX ADMIN — PANTOPRAZOLE SODIUM 40 MG: 40 TABLET, DELAYED RELEASE ORAL at 08:09

## 2024-09-01 RX ADMIN — ACETAMINOPHEN 650 MG: 325 TABLET ORAL at 03:09

## 2024-09-01 RX ADMIN — ASPIRIN 81 MG: 81 TABLET, COATED ORAL at 08:09

## 2024-09-01 RX ADMIN — METHOCARBAMOL 750 MG: 750 TABLET ORAL at 08:09

## 2024-09-01 RX ADMIN — MAGNESIUM SULFATE HEPTAHYDRATE 2 G: 40 INJECTION, SOLUTION INTRAVENOUS at 09:09

## 2024-09-01 RX ADMIN — NIFEDIPINE 30 MG: 30 TABLET, FILM COATED, EXTENDED RELEASE ORAL at 08:09

## 2024-09-01 RX ADMIN — POLYETHYLENE GLYCOL 3350 17 G: 17 POWDER, FOR SOLUTION ORAL at 08:09

## 2024-09-01 RX ADMIN — METHOCARBAMOL 750 MG: 750 TABLET ORAL at 03:09

## 2024-09-01 RX ADMIN — SENNOSIDES AND DOCUSATE SODIUM 1 TABLET: 50; 8.6 TABLET ORAL at 08:09

## 2024-09-01 RX ADMIN — PREGABALIN 50 MG: 25 CAPSULE ORAL at 08:09

## 2024-09-01 RX ADMIN — LIDOCAINE 5% 1 PATCH: 700 PATCH TOPICAL at 08:09

## 2024-09-01 RX ADMIN — CARVEDILOL 6.25 MG: 6.25 TABLET, FILM COATED ORAL at 08:09

## 2024-09-01 RX ADMIN — CYCLOSPORINE 75 MG: 25 CAPSULE, LIQUID FILLED ORAL at 08:09

## 2024-09-01 NOTE — PT/OT/SLP PROGRESS
Physical Therapy Treatment    Patient Name:  Nadia Damon   MRN:  [id number]    Recommendations:     Discharge Recommendations: High Intensity Therapy  Discharge Equipment Recommendations: to be determined by next level of care  Barriers to discharge: None    Assessment:     Nadia Damon is a 69 y.o. female admitted with a medical diagnosis of Acute on chronic kidney failure.  She presents with the following impairments/functional limitations: weakness, impaired endurance, impaired functional mobility, gait instability, impaired balance, decreased lower extremity function, decreased safety awareness, pain, decreased coordination.    Pt demonstrates good effort and participation throughout today's treatment session. Pt continues to show steady progress towards an improved level of function.    Rehab Prognosis: Good; patient would benefit from acute skilled PT services to address these deficits and reach maximum level of function.    Recent Surgery: * No surgery found *      Plan:     During this hospitalization, patient to be seen 3 x/week to address the identified rehab impairments via gait training, therapeutic activities, therapeutic exercises, neuromuscular re-education and progress toward the following goals:    Plan of Care Expires:  09/11/24    Subjective     Chief Complaint: none noted by patient  Patient/Family Comments/goals: excited to go home  Pain/Comfort:  Location - Orientation 1: generalized  Location 1: back  Pain Addressed 1: Reposition, Distraction      Objective:     Communicated with pt's nurse, Annmarie, prior to session.  Patient found HOB elevated with peripheral IV, telemetry, PureWick upon PT entry to room.     General Precautions: Standard, fall, aspiration  Orthopedic Precautions: N/A  Braces:  (may benfit from back brace per PT recommendation)  Respiratory Status: Room air     Functional Mobility:  Bed Mobility:     Rolling Right: stand by assistance  Scooting to plant feet on floor:  stand by assistance  Supine to Sit: stand by assistance  No dizziness noted  Transfers:     Sit to Stand:  contact guard assistance with rolling walker  No dizziness noted  Gait: Patient presents with good RW management and naif while ambulating 215' with contact guard assistance throughout the hallway. Pt demonstrates improving balance with good safety awareness techniques. No signs of distress noted.   Balance: Fair      AM-PAC 6 CLICK MOBILITY  Turning over in bed (including adjusting bedclothes, sheets and blankets)?: 4  Sitting down on and standing up from a chair with arms (e.g., wheelchair, bedside commode, etc.): 3  Moving from lying on back to sitting on the side of the bed?: 4  Moving to and from a bed to a chair (including a wheelchair)?: 3  Need to walk in hospital room?: 3  Climbing 3-5 steps with a railing?: 1 (NT)  Basic Mobility Total Score: 18       Treatment & Education:  Pt educated on the role of therapy, the goals of the session, pacing techniques with safety cues, and overall PT POC. Pt with good understanding.    Pt reports feeling better following the activity session.    Patient left up in chair with all lines intact, call button in reach, and chair alarm on.    GOALS:   Multidisciplinary Problems       Physical Therapy Goals          Problem: Physical Therapy    Goal Priority Disciplines Outcome Goal Variances Interventions   Physical Therapy Goal     PT, PT/OT Progressing     Description: Goals to be met by 9/11/24.  1. Pt will complete bed mobility CGA.  2. Pt will complete sit to stand CGA.  3. Pt will ambulate 50ft CGA using RW.  4. Pt will increase AMPAC score by 2 points to progress functional mobility.                       Time Tracking:     PT Received On: 09/01/24  PT Start Time: 1021     PT Stop Time: 1036  PT Total Time (min): 15 min     Billable Minutes: Gait Training 15    Treatment Type: Treatment  PT/PTA: PTA     Number of PTA visits since last PT visit: 1      09/01/2024

## 2024-09-01 NOTE — PLAN OF CARE
[facility] Tele[facility] (Hospital)  Discharge Final Note    Primary Care Provider: Elis Wick MD    Expected Discharge Date: 9/1/2024    Final Discharge Note (most recent)       Final Note - 09/01/24 1044          Final Note    Assessment Type Final Discharge Note (P)      Anticipated Discharge Disposition Home or Self Care (P)         Post-Acute Status    Discharge Delays None known at this time (P)                      Important Message from Medicare             Contact Info       Carter Crawford MD   Specialty: Nephrology   Relationship: Consulting Physician    [address]  [place] [address]   Phone: [phone]       Next Steps: Follow up    Instructions: Please make sure to follow up with your nephrologist regarding your chronic kidney disease    [facility]    [address] [facility] [address]       Next Steps: Follow up    Instructions: Resume OT, PT, and speech therapy    Samantha Hernandez MD   Specialty: Family Medicine    [address] [place] [address] B  [facility] [address]   Phone: [phone]       Next Steps: Follow up    Instructions: Your labs showed improvement during your hospitalization. It is very important for you to follow up with your PCP so they can repeat lab work to ensure normalization and perform other evaluations as necessitated. Followup with your primary care physician within 1 week for updated labwork, medication adjustments and any routine post-discharge care.        Discharge orders are in.  Therapy recommends post-acute services & patient is current with [facility] outpatient therapy.  Plan is for her to resume outpatient therapy with  Rehab.  No other orders for either DME or services.  No needs or discharge delays.

## 2024-09-01 NOTE — DISCHARGE SUMMARY
[facility]  Discharge Summary      Patient Name: Nadia Damon  MRN: [id number]  CHRISTIANO: [id number]  Patient Class: IP- Inpatient  Admission Date: 8/27/2024  Hospital Length of Stay: 4 days  Discharge Date and Time:  09/01/2024 10:53 AM  Attending Physician: Rachid Rothman DO   Discharging Provider: Rachid Rothman DO  Primary Care Provider: Elis Wick MD    Primary Care Team: Networked reference to record PCT     HPI:   Nadia Damon is a 69 year old female with a PMHx of OA, Anxiety, CHF, CKD, CAD, Depression, Fibromyalgia, HTN, Heart transplant, GUSTAVO and Hypothyroidism who presented the Emergency Department with c/o acute on chronic back pain. Patient reports back pain as worsened over the last few days. She mentioned being scheduled for steroid injection but unable to get sooner appt. She was seen by Dr. Canales's PA (Neurosurgery) on 8/20/24 for same symptom complaint. Patient unable to undergo MRI so CT myelogram performed.  There is a disc herniation at T11-T12 causing moderate central spinal stenosis and severe right-sided foraminal stenosis. Recommended continued rehab at this time. ED workup showed: WBC count 3.45K, Hgb/Hct 9.0/27.9, Na+ 142, K+ 4.0, BUN 60, creatinine 5.5, UA negative for infectious process. Pt placed in observation for acute on chronic renal failure.     * No surgery found *      Hospital Course:    CT L spine showed No acute fracture or dislocation.  Mild moderate multilevel degenerative disc disease. PT/OT rec placement on discharge, social work consulted.  Patient preferred follow up with outpatient rehab PT/OT/SLP where she had already established care.  Nephrology consulted.  Epoetin tristan administered for anemia of CKD. Patient started on gentle IV fluids with sodium bicarbonate. Renal function continued to improve with fluid resuscitation.  Electrolyte abnormalities resolving.  Constipation resolved with bowel regimen.      Discharge plan of care was discussed at length with patient including patient's need for close outpatient follow-up with upcoming PCP visit this week including repeat lab work.  Counseled on PCP followup, nephrology followup, cardiology follow up, followup labs, renal diet, medication compliance and potential side effects/adverse events of medications.  Counseled on fluid intake to maintain euvolemic status without dehydration or volume overload as Furosemide was held due to GRACE on CKD. Counseled on adverse effects of pain medications. All questions and concerns were answered. Return precautions provided.  Patient counseled to return to the hospital or seek medical care if baseline status should suddenly change, return of symptoms occurs, or for any new or concerning symptoms that arise.  Patient was in agreement with plan of care going forward. Patient seen and examined. Patient deemed stable for discharge. Patient is medically cleared to resume prior outpatient therapy services.        Goals of Care Treatment Preferences:  Code Status: Full Code    Living Will: Yes              SDOH Screening:  The patient was screened for utility difficulties, food insecurity, transport difficulties, housing insecurity, and interpersonal safety and there were no concerns identified this admission.     Consults:   Consults (From admission, onward)          Status Ordering Provider     Inpatient consult to Social Work  Once        Provider:  (Not yet assigned)    Completed GUY BAEZ     Inpatient consult to Nephrology  Once        Provider:  Brodie Mcnair MD    Completed RIGOBERTO WHEELER            No new Assessment & Plan notes have been filed under this hospital service since the last note was generated.  Service: Hospital Medicine    Final Active Diagnoses:    Diagnosis Date Noted POA    PRINCIPAL PROBLEM:  Acute on chronic kidney failure [N17.9, N18.9] 11/22/2021 Yes    Chronic diastolic congestive heart failure  [I50.32] 07/21/2024 Yes    Spinal stenosis of lumbosacral region [M48.07] 07/01/2024 Yes    Anemia associated with stage 4 chronic renal failure [N18.4, D63.1] 01/20/2022 Yes     Chronic    Essential hypertension [I10] 04/13/2017 Yes     Chronic    Gastroesophageal reflux disease without esophagitis [K21.9] 04/20/2016 Yes     Chronic    Heart transplanted [Z94.1] 05/15/2013 Not Applicable     Chronic      Problems Resolved During this Admission:       Discharged Condition: stable    Disposition: Home or Self Care    Follow Up:   Follow-up Information       Carter Crawford MD Follow up.    Specialty: Nephrology  Why: Please make sure to follow up with your nephrologist regarding your chronic kidney disease  Contact information:  [address]  [place] [address] [phone]               [facility] Follow up.    Why: Resume OT, PT, and speech therapy  Contact information:  [address] [facility] [address]             Samantha Hernandez MD Follow up.    Specialty: Family Medicine  Why: Your labs showed improvement during your hospitalization. It is very important for you to follow up with your PCP so they can repeat lab work to ensure normalization and perform other evaluations as necessitated. Followup with your primary care physician within 1 week for updated labwork, medication adjustments and any routine post-discharge care.  Contact information:  [address] [facility] [address]  [phone]                           Patient Instructions:      Notify your health care provider if you experience any of the following:  temperature >100.4     Notify your health care provider if you experience any of the following:  persistent nausea and vomiting or diarrhea     Notify your health care provider if you experience any of the following:  severe uncontrolled pain     Notify your health care provider if you experience any of the following:  redness, tenderness,  or signs of infection (pain, swelling, redness, odor or green/yellow discharge around incision site)     Notify your health care provider if you experience any of the following:  difficulty breathing or increased cough     Notify your health care provider if you experience any of the following:  severe persistent headache     Notify your health care provider if you experience any of the following:  worsening rash     Notify your health care provider if you experience any of the following:  persistent dizziness, light-headedness, or visual disturbances     Notify your health care provider if you experience any of the following:  increased confusion or weakness       Significant Diagnostic Studies: Labs: CMP   Recent Labs   Lab 08/31/24  0513 09/01/24  0647   *  146* 144   K 3.8  3.8 3.5   *  117* 115*   CO2 18*  18* 17*   GLU 73  73 80   BUN 31*  31* 23   CREATININE 3.6*  3.6* 3.2*   CALCIUM 7.7*  7.7* 7.8*   PROT  --  6.6   ALBUMIN 2.7*  2.7* 3.0*   BILITOT  --  0.4   ALKPHOS  --  65   AST  --  26   ALT  --  15   ANIONGAP 11  11 12   , CBC   Recent Labs   Lab 09/01/24  0647   WBC 3.33*   HGB 7.8*   HCT 24.6*   *   , and All labs within the past 24 hours have been reviewed    Pending Diagnostic Studies:       None           Medications:  Reconciled Home Medications:      Medication List        START taking these medications      sodium bicarbonate 650 MG tablet  Take 1 tablet (650 mg total) by mouth 2 (two) times daily.            CHANGE how you take these medications      furosemide 20 MG tablet  Commonly known as: LASIX  Take 1 tablet (20 mg total) by mouth 2 (two) times daily. HOLD UNTIL FOLLOW UP WITH PCP  What changed:   when to take this  additional instructions            CONTINUE taking these medications      aspirin 81 MG EC tablet  Commonly known as: ECOTRIN  Take 1 tablet (81 mg total) by mouth once daily.     carvediloL 6.25 MG tablet  Commonly known as: COREG  Take 1 tablet  (6.25 mg total) by mouth 2 (two) times daily with meals. Hold parameters: SBP less than 110 and/or DBP less than 75     cycloSPORINE modified (NEORAL) 25 MG capsule  Commonly known as: NEORAL  Take 3 capsules (75 mg total) by mouth 2 (two) times daily.     hydrALAZINE 25 MG tablet  Commonly known as: APRESOLINE  Take 25 mg by mouth every 8 (eight) hours.     methocarbamoL 750 MG Tab  Commonly known as: ROBAXIN  Take 750 mg by mouth 3 (three) times daily.     NIFEdipine 30 MG (OSM) 24 hr tablet  Commonly known as: PROCARDIA-XL  Take 1 tablet (30 mg total) by mouth once daily.     oxyCODONE-acetaminophen  mg per tablet  Commonly known as: PERCOCET  Take 1 tablet by mouth every 6 (six) hours as needed.     pantoprazole 20 MG tablet  Commonly known as: PROTONIX  TAKE 1 TABLET ONE TIME DAILY     polyethylene glycol 17 gram Pwpk  Commonly known as: GLYCOLAX  Take 17 g by mouth once daily.     pregabalin 50 MG capsule  Commonly known as: LYRICA  Take 1 capsule (50 mg total) by mouth 2 (two) times daily.     raloxifene 60 mg tablet  Commonly known as: EVISTA  Take 60 mg by mouth once daily.     temazepam 30 mg capsule  Commonly known as: RESTORIL  TAKE 1 CAPSULE NIGHTLY AS NEEDED FOR INSOMNIA     traMADoL 50 mg tablet  Commonly known as: ULTRAM  Take 50 mg by mouth every 6 (six) hours as needed for Pain.     vitamin E 400 UNIT capsule  Take 400 Units by mouth once daily.              Indwelling Lines/Drains at time of discharge:   Lines/Drains/Airways       None                   Time spent on the discharge of patient: 45 minutes         Rachid Rothman DO  Department of Hospital Medicine  [facility] - Tele[facility] ([facility])    Voice recognition software was used in the creation of this note/communication and any sound-alike/typographical errors which may have occurred despite initial review prior to signing should be taken in context when interpreting.  If such errors prevent a clear understanding of the  note/communication, please contact the provider/office for clarification.

## 2024-09-01 NOTE — PLAN OF CARE
Problem: Adult Inpatient Plan of Care  Goal: Plan of Care Review  Outcome: Adequate for Care Transition  Goal: Patient-Specific Goal (Individualized)  Outcome: Adequate for Care Transition  Goal: Absence of Hospital-Acquired Illness or Injury  Outcome: Adequate for Care Transition  Goal: Optimal Comfort and Wellbeing  Outcome: Adequate for Care Transition  Goal: Readiness for Transition of Care  Outcome: Adequate for Care Transition     Problem: Acute Kidney Injury/Impairment  Goal: Fluid and Electrolyte Balance  Outcome: Adequate for Care Transition  Goal: Improved Oral Intake  Outcome: Adequate for Care Transition  Goal: Effective Renal Function  Outcome: Adequate for Care Transition     Problem: Skin Injury Risk Increased  Goal: Skin Health and Integrity  Outcome: Adequate for Care Transition     Problem: Fall Injury Risk  Goal: Absence of Fall and Fall-Related Injury  Outcome: Adequate for Care Transition

## 2024-09-01 NOTE — PLAN OF CARE
Problem: Adult Inpatient Plan of Care  Goal: Plan of Care Review  Outcome: Progressing  Goal: Patient-Specific Goal (Individualized)  Outcome: Progressing  Goal: Absence of Hospital-Acquired Illness or Injury  Outcome: Progressing  Goal: Optimal Comfort and Wellbeing  Outcome: Progressing  Goal: Readiness for Transition of Care  Outcome: Progressing     Problem: Acute Kidney Injury/Impairment  Goal: Fluid and Electrolyte Balance  Outcome: Progressing  Goal: Improved Oral Intake  Outcome: Progressing  Goal: Effective Renal Function  Outcome: Progressing     Problem: Skin Injury Risk Increased  Goal: Skin Health and Integrity  Outcome: Progressing     Problem: Fall Injury Risk  Goal: Absence of Fall and Fall-Related Injury  Outcome: Progressing

## 2024-09-01 NOTE — DISCHARGE INSTRUCTIONS
-You were admitted to our hospital for treatment of back pain and acute kidney injury. Over the course of your hospitalization, our PT/OT/ST team, Nephrology team also evaluated you.    -Please read the attached information regarding back pain, acute kidney injury and go to your nearest ED if any of the alarm/concerning signs develop.    -Please read the attached instructions on a kidney disease diet. Please make sure to follow up with your nephrologist regarding your chronic kidney disease    -Some of your home medications for pain management can lead to sedation. Please make sure to limit sedating pain medications as it can lead to increased adverse effects in the setting of altered kidney function.    -Your labs showed improvement during your hospitalization. It is very important for you to follow up with your PCP so they can repeat lab work to ensure normalization and perform other evaluations as necessitated. Followup with your primary care physician within 1 week for updated labwork, medication adjustments and any routine post-discharge care.     ESTABLISHED PATIENT  09/05/24  3:40 PM (40 min.)  Samantha Hernandez MD  [facility] - [facility]

## 2024-09-01 NOTE — NURSING
Discharge instructions received and reviewed with pt at bedside.  Pt voiced understanding and all questions answered to satisfaction.  Stressed importance to making and keeping all follow up appointments.  Medications sent to pt pharmacy and reviewed with pt.  Discharge education provided by virtual nurse.  Tele monitor removed and brought to monitor tech.  IV d/c'd with tip intact, pressure dressing applied.  Pt transported to front of hospital via w/c by RN to be discharged home.

## 2024-09-05 ENCOUNTER — OFFICE VISIT (OUTPATIENT)
Dept: PRIMARY CARE CLINIC | Facility: CLINIC | Age: 69
End: 2024-09-05
Payer: MEDICARE

## 2024-09-05 ENCOUNTER — PATIENT MESSAGE (OUTPATIENT)
Dept: ADMINISTRATIVE | Facility: CLINIC | Age: 69
End: 2024-09-05
Payer: MEDICARE

## 2024-09-05 ENCOUNTER — PATIENT OUTREACH (OUTPATIENT)
Dept: ADMINISTRATIVE | Facility: CLINIC | Age: 69
End: 2024-09-05
Payer: MEDICARE

## 2024-09-05 VITALS
OXYGEN SATURATION: 98 % | BODY MASS INDEX: 28.07 KG/M2 | SYSTOLIC BLOOD PRESSURE: 120 MMHG | HEART RATE: 75 BPM | WEIGHT: 152.56 LBS | HEIGHT: 62 IN | RESPIRATION RATE: 20 BRPM | TEMPERATURE: 98 F | DIASTOLIC BLOOD PRESSURE: 62 MMHG

## 2024-09-05 DIAGNOSIS — I50.32 CHRONIC DIASTOLIC CONGESTIVE HEART FAILURE: ICD-10-CM

## 2024-09-05 DIAGNOSIS — M54.6 CHRONIC RIGHT-SIDED THORACIC BACK PAIN: ICD-10-CM

## 2024-09-05 DIAGNOSIS — I69.351 HEMIPARESIS AFFECTING RIGHT SIDE AS LATE EFFECT OF CEREBROVASCULAR ACCIDENT (CVA): ICD-10-CM

## 2024-09-05 DIAGNOSIS — Z09 HOSPITAL DISCHARGE FOLLOW-UP: Primary | ICD-10-CM

## 2024-09-05 DIAGNOSIS — N18.5 CKD (CHRONIC KIDNEY DISEASE) STAGE 5, GFR LESS THAN 15 ML/MIN: ICD-10-CM

## 2024-09-05 DIAGNOSIS — G89.29 CHRONIC RIGHT-SIDED THORACIC BACK PAIN: ICD-10-CM

## 2024-09-05 PROCEDURE — 99999 PR PBB SHADOW E&M-EST. PATIENT-LVL V: CPT | Mod: PBBFAC,HCNC,, | Performed by: FAMILY MEDICINE

## 2024-09-05 NOTE — PROGRESS NOTES
Patient Care Team:  Elis Wick MD as PCP - General (Internal Medicine)  Miguel Soni Jr., MD as Consulting Physician (Vascular Surgery)  Ike King MD as Consulting Physician (Cardiology)  Courtney Tubbs MD as Consulting Physician (Cardiology)  Carter Crawford MD as Consulting Physician (Nephrology)  Paxton Vasques OD as Consulting Physician (Optometry)  PRANAY Villalobos MD as Obstetrician (Obstetrics)  Parker Mccarthy IV, MD (Urology)  Ike King MD as Consulting Physician (Cardiology)  Jose Roland MD as Consulting Physician (Dermatology)  Prasanth Johnson MD as Consulting Physician (Rheumatology)  Elis Wick MD as Physician (Internal Medicine)  Lexi Bianchi LCSW as  (Hematology and Oncology)  Candace Saleh LMSW as  (Hematology and Oncology)  Kelsie Burk PA-C as Physician Assistant (Hematology and Oncology)  Chelsea Monte as ED Navigator  Elis Wick MD  Future Appointments   Date Time Provider Department Center   9/27/2024  4:40 PM Andrea Hernandez MD ON CARDIO [facility]   10/9/2024 11:00 AM Christoph Douglas MD HG GENSUR [place]   10/17/2024  1:00 PM Kristine Delgado NP [facility]   10/23/2024 10:30 AM LABORATORY, CYADEN BURRELL ON LAB [facility]   10/29/2024  1:30 PM Carter Crawford MD HG NEPHRO [place]   11/25/2024  1:30 PM Guicho Godinez MD ON ENT [facility]   1/16/2025 11:00 AM LABORATORYCAYDEN ONLH LAB [facility]   1/17/2025 10:00 AM Sami Cochran MD ON UROLOGY [facility]   1/23/2025  2:30 PM Prasanth Johnson MD ON RHEU [facility]       Subjective:      Patient ID: Nadia Damon is a 69 y.o. female.    Chief Complaint: No chief complaint on file.      HPI      Review of Systems  PHQ-4 Score: 0   Last worry score 3    PMHx, active ongoing problems, labs and medications reviewed    Objective:   /62 (BP Location: Left arm, Patient Position: Sitting, BP Method: Medium  "(Manual))   Pulse 75   Temp 97.5 °F (36.4 °C)   Resp 20   Ht 5' 2" (1.575 m)   Wt 69.2 kg (152 lb 8.9 oz)   LMP 06/20/1983 (Within Years)   SpO2 98%   BMI 27.90 kg/m²     Physical Exam    Assessment and Plan     1. Hospital discharge follow-up  Overview:  Interstim placed 6/11/24          Discharge instructions     The following issues were discussed: The encounter diagnosis was Hospital discharge follow-up.    "

## 2024-09-05 NOTE — PROGRESS NOTES
C3 nurse attempted to contact Nadia Damon for a TCC post hospital discharge follow up call. The patient is unable to conduct the call @ this time. The patient requested a callback.    The patient has a scheduled HOSFU appointment with Samantha Hernandez MD  on 09/05/24 @ [id number].

## 2024-09-05 NOTE — PROGRESS NOTES
Nadia Damon  09/05/2024  [id number]    Elis Wick MD  Patient Care Team:  Elis Wick MD as PCP - General (Internal Medicine)  Miguel Soni Jr., MD as Consulting Physician (Vascular Surgery)  Ike King MD as Consulting Physician (Cardiology)  Courtney Tubbs MD as Consulting Physician (Cardiology)  Carter Crawford MD as Consulting Physician (Nephrology)  Paxton Vasques OD as Consulting Physician (Optometry)  PRANAY Villalobos MD as Obstetrician (Obstetrics)  Parker Mccarthy IV, MD (Urology)  Ike King MD as Consulting Physician (Cardiology)  Jose Roland MD as Consulting Physician (Dermatology)  Prasanth Johnson MD as Consulting Physician (Rheumatology)  Elis Wick MD as Physician (Internal Medicine)  Lexi Bianchi LCSW as  (Hematology and Oncology)  Candace Saleh LMSW as  (Hematology and Oncology)  Kelsie Burk PA-C as Physician Assistant (Hematology and Oncology)  Chelsea Monte as ED Navigator  Future Appointments       Date Provider Specialty Appt Notes    9/27/2024 Andrea Hernandez MD Cardiology dicharged from [facility] on 08/11    10/9/2024 Christoph Douglas MD Surgery 3 month f/u    10/17/2024 Kristine Delgado, NP Breast Surgery exam only    10/23/2024  Lab [facility]    10/29/2024 Carter Crawford MD Nephrology 5m/lab/sf    11/25/2024 Guicho Godinez MD Otolaryngology 3 month f/u sinus/Throat    1/16/2025  Lab Dr. Johnson    1/17/2025 Sami Cochran MD Urology 6 month f/u    1/23/2025 Prasanth Johnson MD Rheumatology rtc 6 months             Hosp/ED/Urgent Care:  8/27-9/1/2024 [facility]. Aucte renal injury on chronic renal failure stage 5. Nausea. eGFR 11-15, discharged on 15  7/19-7/29/2024 [facility]. CVA s/p TNK. ICU. Right hemiparesis now in PT    Visit Type:   Chief Complaint:  No chief complaint on file.      History of Present Illness:     Follow up recent hospital discharge a few days ago for acute renal injury, on  medically managed CKD 5. Initial eGFR was 11, discharged on 15. Pt states she no longer feels nauseous.     In PT since stroke. Was told she has a hole in the heart after the stroke. Hx heart transplant 1993. Primary cardiologist Dr. Kelly outside [facility]      The following were reviewed: Active problem list, medication list, allergies, family history, social history, and Health Maintenance.     Medications have been reviewed and reconciled with patient at visit today.      Review of Systems   Respiratory:  Negative for shortness of breath.    Cardiovascular:  Negative for chest pain.      See HPI above  PHQ-4 Score: 0    Exam:  Vitals:    09/05/24 1541   BP: 120/62   Pulse: 75   Resp: 20   Temp: 97.5 °F (36.4 °C)     Weight: 69.2 kg (152 lb 8.9 oz)   Body mass index is 27.9 kg/m².    BP Readings from Last 3 Encounters:   09/05/24 120/62   09/01/24 (!) 141/71   08/20/24 118/71        Wt Readings from Last 3 Encounters:   09/05/24 1541 69.2 kg (152 lb 8.9 oz)   09/01/24 0359 72 kg (158 lb 11.7 oz)   08/31/24 0405 69.8 kg (153 lb 14.1 oz)   08/29/24 0520 71.7 kg (158 lb 1.1 oz)   08/28/24 1948 72.4 kg (159 lb 9.8 oz)   08/27/24 2020 63 kg (138 lb 14.2 oz)   08/27/24 1042 66.5 kg (146 lb 9.7 oz)   08/23/24 1234 66.4 kg (146 lb 6.2 oz)        Physical Exam  Vitals and nursing note reviewed.   Constitutional:       General: She is not in acute distress.     Appearance: She is ill-appearing. She is not toxic-appearing.   Cardiovascular:      Rate and Rhythm: Normal rate and regular rhythm.      Heart sounds:      Gallop (s3 heard) present.   Neurological:      Mental Status: She is alert and oriented to person, place, and time.      Motor: Weakness (right hemiparesis) present.      Gait: Gait abnormal.      Laboratory Reviewed    Lab Results   Component Value Date    WBC 3.33 (L) 09/01/2024    HGB 7.8 (L) 09/01/2024    HCT 24.6 (L) 09/01/2024     (L) 09/01/2024    CHOL 142 07/20/2024    TRIG 85 07/20/2024    HDL 44  07/20/2024    ALT 15 09/01/2024    AST 26 09/01/2024     09/01/2024    K 3.5 09/01/2024     (H) 09/01/2024    CREATININE 3.2 (H) 09/01/2024    BUN 23 09/01/2024    CO2 17 (L) 09/01/2024    TSH 5.822 (H) 07/19/2024    PSA <0.1 05/27/2008    INR 1.0 07/20/2024    HGBA1C 5.1 07/02/2024         Health Maintenance  Health Maintenance Topics with due status: Not Due       Topic Last Completion Date    TETANUS VACCINE 09/09/2020    Colorectal Cancer Screening 02/25/2021    Mammogram 04/18/2024    Hemoglobin A1c (Diabetic Prevention Screening) 07/02/2024    DEXA Scan 07/03/2024    Lipid Panel 07/20/2024     Health Maintenance Due   Topic Date Due    RSV Vaccine (Age 60+ and Pregnant patients) (1 - 1-dose 60+ series) Never done    Influenza Vaccine (1) 09/01/2024    COVID-19 Vaccine (7 - 2023-24 season) 09/01/2024       Assessment and Plan   69 y.o. female with multiple co-morbid illnesses here for continued follow up of medical problems.      The primary encounter diagnosis was Hospital discharge follow-up. Diagnoses of CKD (chronic kidney disease) stage 5, GFR less than 15 ml/min, Chronic diastolic congestive heart failure, Hemiparesis affecting right side as late effect of cerebrovascular accident (CVA), and Chronic right-sided thoracic back pain were also pertinent to this visit.  1. Hospital discharge follow-up  Comments:  stroke 7/2024, ckd 5 8/2024 need to see PCP asap  Overview:  Interstim placed 6/11/24      2. CKD (chronic kidney disease) stage 5, GFR less than 15 ml/min  Comments:  need renal appt asap -  will coordinate    3. Chronic diastolic congestive heart failure  Comments:  pt will call cardiology for appt asap. new gallop    4. Hemiparesis affecting right side as late effect of cerebrovascular accident (CVA)  Comments:  cont PT    5. Chronic right-sided thoracic back pain  Comments:  in pain management. cont        Health Maintenance         Date Due Completion Date    RSV Vaccine  (Age 60+ and Pregnant patients) (1 - 1-dose 60+ series) Never done ---    Influenza Vaccine (1) 09/01/2024 11/2/2023    COVID-19 Vaccine (7 - 2023-24 season) 09/01/2024 4/27/2023    Mammogram 04/18/2025 4/18/2024    Lipid Panel 07/20/2025 7/20/2024    Colorectal Cancer Screening 02/25/2026 2/25/2021    Hemoglobin A1c (Diabetic Prevention Screening) 07/02/2027 7/2/2024    DEXA Scan 07/03/2027 7/3/2024    TETANUS VACCINE 09/09/2030 9/9/2020            Discharge instructions        -Patient's lab results were reviewed and discussed with patient  -Treatment options and alternatives were discussed with the patient. Patient expressed understanding. Patient was given the opportunity to ask questions and be an active participant in their medical care. Patient had no further questions or concerns at this time.     Follow up: No follow-ups on file.

## 2024-09-05 NOTE — PROGRESS NOTES
2nd attempt made to reach patient for TCC call. Left voicemail please call [phone] and leave first name, last name and  for Vincent. I will return your call.

## 2024-09-06 ENCOUNTER — TELEPHONE (OUTPATIENT)
Dept: PRIMARY CARE CLINIC | Facility: CLINIC | Age: 69
End: 2024-09-06
Payer: MEDICARE

## 2024-09-06 DIAGNOSIS — N18.4 CKD (CHRONIC KIDNEY DISEASE) STAGE 4, GFR 15-29 ML/MIN: Primary | Chronic | ICD-10-CM

## 2024-09-06 NOTE — PROGRESS NOTES
3rd Attempt made to reach patient for TCC call. Left voicemail please call [phone] leave first name, last name, and  for Vincent.  I will return your call.

## 2024-09-09 ENCOUNTER — PATIENT MESSAGE (OUTPATIENT)
Dept: PAIN MEDICINE | Facility: HOSPITAL | Age: 69
End: 2024-09-09
Payer: MEDICARE

## 2024-09-09 ENCOUNTER — TELEPHONE (OUTPATIENT)
Dept: TRANSPLANT | Facility: CLINIC | Age: 69
End: 2024-09-09
Payer: MEDICARE

## 2024-09-09 NOTE — TELEPHONE ENCOUNTER
Received call from pt at 1515, discussed future f/u visits scheduled with historical cardiac provider, Dr. Lopez.

## 2024-09-09 NOTE — TELEPHONE ENCOUNTER
----- Message from Lizz Aquino MA sent at 9/9/2024  4:43 PM CDT -----  Contact: self  Pt is returning your call. Please call [phone].

## 2024-09-09 NOTE — TELEPHONE ENCOUNTER
Returned pt's call regarding her echo results completed outside [facility] and her Doctor is telling her that she has an S3 Sound, She would like an appt to discuss with Dr. Tubbs. I have updated the Care everywhere. Left a voice message for pt and scheduled her for the soonest appt with Dr. Tubbs at [facility]

## 2024-09-09 NOTE — TELEPHONE ENCOUNTER
Received call from pt regarding f/u visits. Pt states that Dr. Andrea Hernandez has not provided an earlier time for a cardiac visit, 1st available was 09/27/2024. Pt has scheduled a visit with her historical cardiac provider, Dr. Lopez, for 09/17/2024

## 2024-09-10 ENCOUNTER — LAB VISIT (OUTPATIENT)
Dept: LAB | Facility: HOSPITAL | Age: 69
End: 2024-09-10
Attending: INTERNAL MEDICINE
Payer: MEDICARE

## 2024-09-10 ENCOUNTER — OFFICE VISIT (OUTPATIENT)
Dept: NEPHROLOGY | Facility: CLINIC | Age: 69
End: 2024-09-10
Payer: MEDICARE

## 2024-09-10 ENCOUNTER — TELEPHONE (OUTPATIENT)
Dept: TRANSPLANT | Facility: CLINIC | Age: 69
End: 2024-09-10
Payer: MEDICARE

## 2024-09-10 VITALS
HEART RATE: 84 BPM | DIASTOLIC BLOOD PRESSURE: 80 MMHG | WEIGHT: 156.31 LBS | SYSTOLIC BLOOD PRESSURE: 150 MMHG | BODY MASS INDEX: 28.76 KG/M2 | HEIGHT: 62 IN

## 2024-09-10 DIAGNOSIS — Z79.899 ENCOUNTER FOR LONG-TERM (CURRENT) USE OF MEDICATIONS: ICD-10-CM

## 2024-09-10 DIAGNOSIS — N18.4 STAGE 4 CHRONIC KIDNEY DISEASE: Primary | ICD-10-CM

## 2024-09-10 DIAGNOSIS — I10 ESSENTIAL HYPERTENSION: ICD-10-CM

## 2024-09-10 DIAGNOSIS — N18.4 CKD (CHRONIC KIDNEY DISEASE) STAGE 4, GFR 15-29 ML/MIN: Chronic | ICD-10-CM

## 2024-09-10 DIAGNOSIS — R06.02 SHORTNESS OF BREATH: ICD-10-CM

## 2024-09-10 DIAGNOSIS — E78.2 MIXED HYPERLIPIDEMIA: ICD-10-CM

## 2024-09-10 DIAGNOSIS — T86.20 COMPLICATION OF HEART TRANSPLANT, UNSPECIFIED COMPLICATION: ICD-10-CM

## 2024-09-10 DIAGNOSIS — Z94.1 STATUS POST HEART TRANSPLANT: ICD-10-CM

## 2024-09-10 LAB
ANION GAP SERPL CALC-SCNC: 11 MMOL/L (ref 8–16)
BASOPHILS # BLD AUTO: 0.01 K/UL (ref 0–0.2)
BASOPHILS NFR BLD: 0.3 % (ref 0–1.9)
BNP SERPL-MCNC: 441 PG/ML (ref 0–99)
BUN SERPL-MCNC: 58 MG/DL (ref 8–23)
CALCIUM SERPL-MCNC: 9 MG/DL (ref 8.7–10.5)
CHLORIDE SERPL-SCNC: 106 MMOL/L (ref 95–110)
CO2 SERPL-SCNC: 22 MMOL/L (ref 23–29)
CREAT SERPL-MCNC: 4.5 MG/DL (ref 0.5–1.4)
DIFFERENTIAL METHOD BLD: ABNORMAL
EOSINOPHIL # BLD AUTO: 0.1 K/UL (ref 0–0.5)
EOSINOPHIL NFR BLD: 3.9 % (ref 0–8)
ERYTHROCYTE [DISTWIDTH] IN BLOOD BY AUTOMATED COUNT: 17.2 % (ref 11.5–14.5)
EST. GFR  (NO RACE VARIABLE): 10 ML/MIN/1.73 M^2
GLUCOSE SERPL-MCNC: 80 MG/DL (ref 70–110)
HCT VFR BLD AUTO: 26.2 % (ref 37–48.5)
HGB BLD-MCNC: 8.4 G/DL (ref 12–16)
IMM GRANULOCYTES # BLD AUTO: 0.01 K/UL (ref 0–0.04)
IMM GRANULOCYTES NFR BLD AUTO: 0.3 % (ref 0–0.5)
LYMPHOCYTES # BLD AUTO: 1.1 K/UL (ref 1–4.8)
LYMPHOCYTES NFR BLD: 31.5 % (ref 18–48)
MCH RBC QN AUTO: 29.3 PG (ref 27–31)
MCHC RBC AUTO-ENTMCNC: 32.1 G/DL (ref 32–36)
MCV RBC AUTO: 91 FL (ref 82–98)
MONOCYTES # BLD AUTO: 0.6 K/UL (ref 0.3–1)
MONOCYTES NFR BLD: 18.6 % (ref 4–15)
NEUTROPHILS # BLD AUTO: 1.5 K/UL (ref 1.8–7.7)
NEUTROPHILS NFR BLD: 45.4 % (ref 38–73)
NRBC BLD-RTO: 0 /100 WBC
PLATELET # BLD AUTO: 174 K/UL (ref 150–450)
PMV BLD AUTO: 9.4 FL (ref 9.2–12.9)
POTASSIUM SERPL-SCNC: 6 MMOL/L (ref 3.5–5.1)
RBC # BLD AUTO: 2.87 M/UL (ref 4–5.4)
SODIUM SERPL-SCNC: 139 MMOL/L (ref 136–145)
WBC # BLD AUTO: 3.33 K/UL (ref 3.9–12.7)

## 2024-09-10 PROCEDURE — 80048 BASIC METABOLIC PNL TOTAL CA: CPT | Mod: HCNC | Performed by: INTERNAL MEDICINE

## 2024-09-10 PROCEDURE — 1125F AMNT PAIN NOTED PAIN PRSNT: CPT | Mod: HCNC,CPTII,S$GLB, | Performed by: INTERNAL MEDICINE

## 2024-09-10 PROCEDURE — 83880 ASSAY OF NATRIURETIC PEPTIDE: CPT | Mod: HCNC | Performed by: INTERNAL MEDICINE

## 2024-09-10 PROCEDURE — 99215 OFFICE O/P EST HI 40 MIN: CPT | Mod: HCNC,S$GLB,, | Performed by: INTERNAL MEDICINE

## 2024-09-10 PROCEDURE — 3008F BODY MASS INDEX DOCD: CPT | Mod: HCNC,CPTII,S$GLB, | Performed by: INTERNAL MEDICINE

## 2024-09-10 PROCEDURE — 3079F DIAST BP 80-89 MM HG: CPT | Mod: HCNC,CPTII,S$GLB, | Performed by: INTERNAL MEDICINE

## 2024-09-10 PROCEDURE — 85025 COMPLETE CBC W/AUTO DIFF WBC: CPT | Mod: HCNC | Performed by: INTERNAL MEDICINE

## 2024-09-10 PROCEDURE — 1111F DSCHRG MED/CURRENT MED MERGE: CPT | Mod: HCNC,CPTII,S$GLB, | Performed by: INTERNAL MEDICINE

## 2024-09-10 PROCEDURE — 3077F SYST BP >= 140 MM HG: CPT | Mod: HCNC,CPTII,S$GLB, | Performed by: INTERNAL MEDICINE

## 2024-09-10 PROCEDURE — 36415 COLL VENOUS BLD VENIPUNCTURE: CPT | Mod: HCNC | Performed by: INTERNAL MEDICINE

## 2024-09-10 PROCEDURE — 99999 PR PBB SHADOW E&M-EST. PATIENT-LVL III: CPT | Mod: PBBFAC,HCNC,, | Performed by: INTERNAL MEDICINE

## 2024-09-10 PROCEDURE — 1157F ADVNC CARE PLAN IN RCRD: CPT | Mod: HCNC,CPTII,S$GLB, | Performed by: INTERNAL MEDICINE

## 2024-09-10 PROCEDURE — 3066F NEPHROPATHY DOC TX: CPT | Mod: HCNC,CPTII,S$GLB, | Performed by: INTERNAL MEDICINE

## 2024-09-10 PROCEDURE — 1159F MED LIST DOCD IN RCRD: CPT | Mod: HCNC,CPTII,S$GLB, | Performed by: INTERNAL MEDICINE

## 2024-09-10 PROCEDURE — 3044F HG A1C LEVEL LT 7.0%: CPT | Mod: HCNC,CPTII,S$GLB, | Performed by: INTERNAL MEDICINE

## 2024-09-10 NOTE — PROGRESS NOTES
NEPHROLOGY CLINIC FOLLOWUP NOTE  Date of clinic visit: 9/10/24     REASON FOR FOLLOWUP AND CHIEF COMPLAINT: nephrotic syndrome, CKD, post heart transplant, HTN, nephrolithiasis, hypercalcemia     HISTORY OF PRESENT ILLNESS: Ms. Damon is a 69-year-old female with a history of CKD stage 4 (secondary to kidney biopsy (11/10/20) proven calcineurin inhibitor nephrotoxicity (due to cyclosporine) and hypertensive nephrosclerosis), nephrolithiasis (likely mixed stones), and heart transplant in 1993 (is on cyclosporine), who presents for f/u. Pt was last seen by me in May 2024. She was doing well at the time and was stable. Pt unfortunately had a stroke in July 2024 and has not done well since then. Pt was hospitalized in Aug 2024 for acute on chronic back pain. S Cr had worsened and lasix was held on d/c. Pt was previously on veltassa, K was borderline low, and veltassa was also held. Pt has chronic hyperkalemia likely as a result of CSA side effect. Losartan was aloso     On f/u today, pt reports being chronically ill, has worsened LE swelling, no SOB. Labs and meds reviewed. Noted Cr is above prior baseline. Pt denies taking any NSAIds, no abx, no recent infections. No other pertinent issues to explain worsened s Cr.     To review: Past mild, persistent hypercalcemia reviewed. Previously suspected related to primary hyperparathyroidism (HPT) is suspected. Sestamibi nuclear scan of the neck did not show any adenomas. Her risk factors for kidney stones include hypercalcemia and being on cyclosporine for prevention of heart transplant rejection, which causes hyperuricemia, and prior intake of vitamin C.. Pt has a h/o of osteopenia.      To review, pt also has breast cancer (she did have persistent mild hypercalcemia in the past). Hem/onc notes were reviewed. 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 Pt has had further  complication, requiring hospital admission including pancytopenia, neutropenic fever, axillary cellulitis, and C diff colitis.             PAST MEDICAL HISTORY:  1. CKD stage IV. Nephrotic syndrome. Due to chronic calcineurin inhibitor toxicity due to taking cyclosporine, kidney biopsy was done on 11/10/21  2. Hypertension.  3. Heart transplant for cardiomyopathy in 1993, the patient has been on chronic  cyclosporine treatment.  4. Carpal tunnel syndrome.  5. Fibromyalgia.  6. GERD.  7. Bell's palsy.  8. Depression.     PAST SURGICAL HISTORY: Reviewed as above, unchanged.     MEDICATIONS: Reviewed     Current Outpatient Medications:     aspirin (ECOTRIN) 81 MG EC tablet, Take 1 tablet (81 mg total) by mouth once daily., Disp: , Rfl:     carvediloL (COREG) 6.25 MG tablet, Take 1 tablet (6.25 mg total) by mouth 2 (two) times daily with meals. Hold parameters: SBP less than 110 and/or DBP less than 75, Disp: 180 tablet, Rfl: 3    cycloSPORINE modified, NEORAL, (NEORAL) 25 MG capsule, Take 3 capsules (75 mg total) by mouth 2 (two) times daily., Disp: 540 capsule, Rfl: 1    hydrALAZINE (APRESOLINE) 25 MG tablet, Take 25 mg by mouth every 8 (eight) hours., Disp: , Rfl:     methocarbamoL (ROBAXIN) 750 MG Tab, Take 750 mg by mouth 3 (three) times daily as needed., Disp: , Rfl:     NIFEdipine (PROCARDIA-XL) 30 MG (OSM) 24 hr tablet, Take 1 tablet (30 mg total) by mouth once daily., Disp: , Rfl:     oxyCODONE-acetaminophen (PERCOCET)  mg per tablet, Take 1 tablet by mouth every 6 (six) hours as needed., Disp: , Rfl:     pantoprazole (PROTONIX) 20 MG tablet, TAKE 1 TABLET ONE TIME DAILY, Disp: 90 tablet, Rfl: 3    polyethylene glycol (GLYCOLAX) 17 gram PwPk, Take 17 g by mouth once daily., Disp: , Rfl:     raloxifene (EVISTA) 60 mg tablet, Take 60 mg by mouth once daily., Disp: , Rfl:     sodium bicarbonate 650 MG tablet, Take 1 tablet (650 mg total) by mouth 2 (two) times daily., Disp: 60 tablet, Rfl: 1    temazepam  (RESTORIL) 30 mg capsule, TAKE 1 CAPSULE NIGHTLY AS NEEDED FOR INSOMNIA, Disp: 30 capsule, Rfl: 0    traMADoL (ULTRAM) 50 mg tablet, Take 50 mg by mouth every 6 (six) hours as needed for Pain., Disp: , Rfl:     vitamin E 400 UNIT capsule, Take 400 Units by mouth once daily., Disp: , Rfl:     pregabalin (LYRICA) 50 MG capsule, Take 1 capsule (50 mg total) by mouth 2 (two) times daily., Disp: 180 capsule, Rfl: 0      SOCIAL HISTORY: Reviewed. Negative for smoking. No alcohol use.      REVIEW OF SYSTEMS: No recent hospitalizations.  GENERAL: Negative.  HEAD, EYES, EARS, NOSE, AND THROAT: Negative.  CARDIAC: Negative.  PULMONARY: Negative.  GASTROINTESTINAL: Negative.  GENITOURINARY: Negative.  PSYCHOLOGICAL: Negative.  NEUROLOGICAL: Negative.  ENDOCRINE: Negative.  HEMATOLOGIC AND ONCOLOGIC: Negative.  INFECTIOUS DISEASE: Negative.  The rest of the review of systems negative.     PHYSICAL EXAMINATION:  VITAL SIGNS: /80, Pulse is 84, weight is 70.9 Kg, from 70.8 Kg, from 72.2 Kg,   GENERAL: She is cooperative, pleasant, in no acute distress.   Speech and thought process appropriate, normal.  HEENT: Mucous membranes moist.  NECK: Neck JVD. Normal hearing.  ABDOMEN: Soft, nontender.  EXTREMITIES: 1+ pitting edema.     LABS: Reviewed.   BMP  Lab Results   Component Value Date     09/10/2024    K 6.0 (H) 09/10/2024     09/10/2024    CO2 22 (L) 09/10/2024    BUN 58 (H) 09/10/2024    CREATININE 4.5 (H) 09/10/2024    CALCIUM 9.0 09/10/2024    ANIONGAP 11 09/10/2024    EGFRNORACEVR 10 (A) 09/10/2024     Lab Results   Component Value Date    WBC 3.33 (L) 09/10/2024    HGB 8.4 (L) 09/10/2024    HCT 26.2 (L) 09/10/2024    MCV 91 09/10/2024     09/10/2024       Lab Results   Component Value Date    .2 (H) 05/20/2024    CALCIUM 9.0 09/10/2024    CAION 1.13 09/05/2018    PHOS 3.8 09/01/2024           Urine protein/cr 0.54 g, from 0.32 g, from 0.49 g, from 2.2 g, 6.0 g  U/a: no protein (from 3+), no  blood, 4 RBC's, no casts     Complements C3 and C4 both normal     To review older labs:   24 hour urine: Ca not measured, uric acid 138, Oxalate 20, citrate 203  U/a unremarkable  Urine Cx: neg   Urine P/Cr 740 mg     KUB: unremarkable, except for some constipation     Renal u/s: FINDINGS: 10/22/20  Kidneys measure 10.3 and 9.7 cm in length on the right left respectively.  Cortex is smoothly marginated well maintained with mild diffuse increased echotexture.  Appearance suggest medical renal disease.  Two simple cyst identified within the right kidney.  Upper pole cyst 1.4 cm maximum diameter and inferior pole cyst 2.2 cm maximum diameter.  There is 9 mm simple cyst within the left inferior pole.  Resistive indices are 0.66 and 0.76 on the right left respectively.  Urinary bladder partially distended without focal or diffuse wall thickening.     CT abd: The left kidney appears slightly small.  Right kidney is grossly normal in size.  No obvious hydronephrosis or nephrolithiasis     Sestamibi nuclear scan of the neck: 8/29/19: no adenomas        Kidney biopsy from 11/10/21:                   ASSESSMENT AND PLAN: This is a 69-year-old female who presents for CKD follow up. Pt has had further medical problems since her last renal visit:  The impression is as follows:     1. Renal: s Cr stable, but overall worse. 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 Will restart 8.4 g po qod (qd caused hypokalemia). Hyperkalemia likely due to CSA  Na normal  Acid base stable  Ca  normal  PO4 normal  Secondary hyperparathyroidism: moderate. Holding treatment with calcitriol, because in the past had hypercalcemia (and past h/o of kidney stones).   Anemia:mild, multifactorial, seeing hem/onc  Advised pt that she will likely need dialysis in future.     Kidney biopsy showed:  Damage by HTN (hypertensive nephrosclerosis) as well as calcineurin inhibitor toxicity due to taking cyclosporine to prevent heart transplant rejection. There was 50% chronic interstitial fibrosis.  Pt has been advised of the kidney biopsy in layman's language  She was specifically advised NOT to stop cyclosporine of change the dose on her own.     2. HTN: BP controlled to slightly high. Fluid overloaded, will re-start lasix 20 mg po qd  Reviewed meds with pt    3. Stroke: occurred in July 2024.  Has right sided weakness    4. Cardiac: h/o fo CHF: fluid gain has returned. Will re-start lasix.  H/o of heart transplant 1993  On CSA     5. Nephrolithiasis: no new stones. To review:  Has multiple risk factors for kidney stones: (hyperuricemia from cyclosporine, diet rich in uric acid, previously taking Vit C, vit D, and calcium,, CKD, and having primary hyperparathyroidism)  Stones are likely mixed ca oxalate and uric acid kidney stones  No longer taking Vit C  No longer takes Vit D and Ca.   Mild hyperuricemia, from cyclosporine.  F/u CT did not show any stones  U/s showed non-obstructive 2 R stones, relatively large  24 hour urine for kidney stone stratification shows hypocitraturia  Pt was advised NOT TO STOP cyclosporine.      6. Elevated iPTH, primary vs secondary  No adenomas found on sestamibi nulcear scan  May have diffuse hyperplasia  Primary HPT can explain increased risk of kidney stones     7. H/o of breast cancer: since her last visit with us (she did have persistent mild hypercalcemia in the past). Hem/onc notes were reviewed. 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 Pt has had further complication, requiring hospital admission including pancytopenia, neutropenic fever, axillary cellulitis, and C diff colitis.  Will defer to hem/onc     8. Anemia: normocytic. Likely multifactorial: due to CKD and cancer.  Advised pt to have PRBC transfusion. Last transfusion was in Nov 2021. Pt declined  Has f/u with hem/onc on 1/20/21. Please evaluate for epogen and IV iron therapy.     9. Urinary urge incontinence: following with urology  Noted plans for placement of stimulation device  Renally cleared.    10: chronic back pain: reviewed the chart  Complication of breast cancer?              PLANS AND RECOMMENDATIONS:   As discussed above  Opportunity for questions provided  Complicated case, multiple issues addressed  RTC 6 month. OK to overbook  Total time spent 40 minutes. Extensive time spent including the time to review the records, the patient evaluation, documentation, face-to-face  discussion with the patient. More than 50% of the time was spent in counseling  and coordination of care. Level V visit.     Carter Crawford MD

## 2024-09-11 ENCOUNTER — HOSPITAL ENCOUNTER (EMERGENCY)
Facility: HOSPITAL | Age: 69
Discharge: HOME | End: 2024-09-11
Attending: EMERGENCY MEDICINE
Payer: MEDICARE

## 2024-09-11 ENCOUNTER — TELEPHONE (OUTPATIENT)
Dept: PRIMARY CARE CLINIC | Facility: CLINIC | Age: 69
End: 2024-09-11
Payer: MEDICARE

## 2024-09-11 VITALS
TEMPERATURE: 99 F | RESPIRATION RATE: 19 BRPM | SYSTOLIC BLOOD PRESSURE: 100 MMHG | OXYGEN SATURATION: 98 % | WEIGHT: 146.81 LBS | BODY MASS INDEX: 27.02 KG/M2 | HEIGHT: 62 IN | HEART RATE: 90 BPM | DIASTOLIC BLOOD PRESSURE: 52 MMHG

## 2024-09-11 DIAGNOSIS — Z79.899 IMMUNOCOMPROMISED STATE DUE TO DRUG THERAPY: ICD-10-CM

## 2024-09-11 DIAGNOSIS — D64.9 CHRONIC ANEMIA: ICD-10-CM

## 2024-09-11 DIAGNOSIS — E87.5 ACUTE HYPERKALEMIA: Primary | ICD-10-CM

## 2024-09-11 DIAGNOSIS — Z94.1 HISTORY OF HEART TRANSPLANT: ICD-10-CM

## 2024-09-11 DIAGNOSIS — N18.4 CKD (CHRONIC KIDNEY DISEASE) STAGE 4, GFR 15-29 ML/MIN: ICD-10-CM

## 2024-09-11 DIAGNOSIS — D84.821 IMMUNOCOMPROMISED STATE DUE TO DRUG THERAPY: ICD-10-CM

## 2024-09-11 DIAGNOSIS — R06.02 SHORTNESS OF BREATH: ICD-10-CM

## 2024-09-11 LAB
ALBUMIN SERPL BCP-MCNC: 3.6 G/DL (ref 3.5–5.2)
ALP SERPL-CCNC: 106 U/L (ref 55–135)
ALT SERPL W/O P-5'-P-CCNC: 29 U/L (ref 10–44)
ANION GAP SERPL CALC-SCNC: 14 MMOL/L (ref 8–16)
AST SERPL-CCNC: 42 U/L (ref 10–40)
BASOPHILS # BLD AUTO: 0.01 K/UL (ref 0–0.2)
BASOPHILS NFR BLD: 0.4 % (ref 0–1.9)
BILIRUB SERPL-MCNC: 0.6 MG/DL (ref 0.1–1)
BNP SERPL-MCNC: 491 PG/ML (ref 0–99)
BUN SERPL-MCNC: 58 MG/DL (ref 8–23)
CALCIUM SERPL-MCNC: 8.9 MG/DL (ref 8.7–10.5)
CHLORIDE SERPL-SCNC: 105 MMOL/L (ref 95–110)
CO2 SERPL-SCNC: 18 MMOL/L (ref 23–29)
CREAT SERPL-MCNC: 4.1 MG/DL (ref 0.5–1.4)
DIFFERENTIAL METHOD BLD: ABNORMAL
EOSINOPHIL # BLD AUTO: 0.1 K/UL (ref 0–0.5)
EOSINOPHIL NFR BLD: 4.6 % (ref 0–8)
ERYTHROCYTE [DISTWIDTH] IN BLOOD BY AUTOMATED COUNT: 16.7 % (ref 11.5–14.5)
EST. GFR  (NO RACE VARIABLE): 11 ML/MIN/1.73 M^2
GLUCOSE SERPL-MCNC: 98 MG/DL (ref 70–110)
HCT VFR BLD AUTO: 26.5 % (ref 37–48.5)
HCV AB SERPL QL IA: NEGATIVE
HEP C VIRUS HOLD SPECIMEN: NORMAL
HGB BLD-MCNC: 8.8 G/DL (ref 12–16)
HIV 1+2 AB+HIV1 P24 AG SERPL QL IA: NEGATIVE
IMM GRANULOCYTES # BLD AUTO: 0 K/UL (ref 0–0.04)
IMM GRANULOCYTES NFR BLD AUTO: 0 % (ref 0–0.5)
LYMPHOCYTES # BLD AUTO: 1 K/UL (ref 1–4.8)
LYMPHOCYTES NFR BLD: 38.3 % (ref 18–48)
MAGNESIUM SERPL-MCNC: 2.2 MG/DL (ref 1.6–2.6)
MCH RBC QN AUTO: 29.6 PG (ref 27–31)
MCHC RBC AUTO-ENTMCNC: 33.2 G/DL (ref 32–36)
MCV RBC AUTO: 89 FL (ref 82–98)
MONOCYTES # BLD AUTO: 0.5 K/UL (ref 0.3–1)
MONOCYTES NFR BLD: 18.4 % (ref 4–15)
NEUTROPHILS # BLD AUTO: 1 K/UL (ref 1.8–7.7)
NEUTROPHILS NFR BLD: 38.3 % (ref 38–73)
NRBC BLD-RTO: 0 /100 WBC
PHOSPHATE SERPL-MCNC: 4.7 MG/DL (ref 2.7–4.5)
PLATELET # BLD AUTO: 173 K/UL (ref 150–450)
PMV BLD AUTO: 9.6 FL (ref 9.2–12.9)
POTASSIUM SERPL-SCNC: 5.2 MMOL/L (ref 3.5–5.1)
PROT SERPL-MCNC: 7.8 G/DL (ref 6–8.4)
RBC # BLD AUTO: 2.97 M/UL (ref 4–5.4)
SODIUM SERPL-SCNC: 137 MMOL/L (ref 136–145)
TROPONIN I SERPL DL<=0.01 NG/ML-MCNC: 0.01 NG/ML (ref 0–0.03)
WBC # BLD AUTO: 2.61 K/UL (ref 3.9–12.7)

## 2024-09-11 PROCEDURE — 93005 ELECTROCARDIOGRAM TRACING: CPT | Mod: HCNC

## 2024-09-11 PROCEDURE — 84484 ASSAY OF TROPONIN QUANT: CPT | Mod: HCNC | Performed by: EMERGENCY MEDICINE

## 2024-09-11 PROCEDURE — 99285 EMERGENCY DEPT VISIT HI MDM: CPT | Mod: 25,HCNC

## 2024-09-11 PROCEDURE — 85025 COMPLETE CBC W/AUTO DIFF WBC: CPT | Mod: HCNC | Performed by: EMERGENCY MEDICINE

## 2024-09-11 PROCEDURE — 80053 COMPREHEN METABOLIC PANEL: CPT | Mod: HCNC | Performed by: EMERGENCY MEDICINE

## 2024-09-11 PROCEDURE — 87389 HIV-1 AG W/HIV-1&-2 AB AG IA: CPT | Mod: HCNC | Performed by: EMERGENCY MEDICINE

## 2024-09-11 PROCEDURE — 93010 ELECTROCARDIOGRAM REPORT: CPT | Mod: HCNC,,, | Performed by: INTERNAL MEDICINE

## 2024-09-11 PROCEDURE — 25000003 PHARM REV CODE 250: Mod: HCNC | Performed by: EMERGENCY MEDICINE

## 2024-09-11 PROCEDURE — 83735 ASSAY OF MAGNESIUM: CPT | Mod: HCNC | Performed by: EMERGENCY MEDICINE

## 2024-09-11 PROCEDURE — 84100 ASSAY OF PHOSPHORUS: CPT | Mod: HCNC | Performed by: EMERGENCY MEDICINE

## 2024-09-11 PROCEDURE — 86803 HEPATITIS C AB TEST: CPT | Mod: HCNC | Performed by: EMERGENCY MEDICINE

## 2024-09-11 PROCEDURE — 83880 ASSAY OF NATRIURETIC PEPTIDE: CPT | Mod: HCNC | Performed by: EMERGENCY MEDICINE

## 2024-09-11 RX ADMIN — SODIUM ZIRCONIUM CYCLOSILICATE 10 G: 5 POWDER, FOR SUSPENSION ORAL at 12:09

## 2024-09-11 NOTE — TELEPHONE ENCOUNTER
Spoke w/ pt. Noted labs from yesterday w/ K at 6. Going to ED. No way to repeat labs since clinics are closed.

## 2024-09-11 NOTE — ED PROVIDER NOTES
Encounter Date: 9/11/2024       History     Chief Complaint   Patient presents with    Abnormal Lab     Pt states her Potassium level is 6.0, pt was recently hospitalized for dehydration and discharged last week. Pt c/o sob at this time, denies chest pain. Pt has a hx of a heart transplant 31 years ago & ESRD, not on dialysis     HPI    9/11/2024, 11:05 AM.    History is provided by: patient.      Nadia Damon is a 69 y.o. female presenting to the Emergency Department for elevated potassium level of 6.0 noted on outpatient routine lab work from her nephrology clinic appt with Dr. Crawford yesterday. Patient without any complaints.  Heart transplant 30 plus years ago, denies any chest pain, dyspnea, N/V, no change in bowel or bladder habits, no fever, no chills.     PCP: Elis Wick MD   Review of patient's allergies indicates:   Allergen Reactions    Lisinopril Swelling and Rash    Hydrocodone-acetaminophen Nausea Only    Augmentin [amoxicillin-pot clavulanate] Diarrhea    Zyvox [linezolid] Nausea And Vomiting     Past Medical History:   Diagnosis Date    Abdominal wall hernia     CT Renal 6/11/2018---Small fat containing superior ventral abdominal wall hernia at the epicardial pacing lead site.    Abnormal mammogram 10/12/2021    Acute cystitis without hematuria 05/10/2022    Acute ischemic right middle cerebral artery (MCA) stroke 07/20/2024    GRACE (acute kidney injury) 11/22/2021    Anxiety     Arthritis     ZEN HIPS    Breast cancer in female 08/2021    LEFT BREAST    Bronchitis 08/18/2016    Never smoked      C. difficile colitis 11/29/2021    Cellulitis of axilla, left 12/23/2021    Chronic diastolic heart failure 12/16/2021    Chronic kidney disease     stage 4, GFR 15-29 ml/min    Chronic midline low back pain without sciatica 06/18/2018    Closed nondisplaced fracture of distal phalanx of left great toe with routine healing 10/22/2018    Coronary artery disease 1993    heart transplant    COVID-19  in immunocompromised patient 02/26/2024    Cystitis 05/10/2022    Depression     Encounter for blood transfusion     Fibromyalgia     on Lyrica    Heart failure     native heart cardiomyopathy    Heart transplanted 1993    due to cardiomyopathy    History of hyperparathyroidism; Hyperparathyroidism, secondary renal     PT DENIES    Hypertension     Immune disorder     anti rejection meds    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    Impaired mobility 07/28/2022    Iron deficiency anemia 08/15/2017    Kidney stones     passed per pt    Obesity     Obesity (BMI 30.0-34.9) 07/22/2019    Other osteoporosis without current pathological fracture 08/30/2019    Other pancytopenia 05/06/2024    Parotitis, acute 09/19/2023    Pharyngitis 01/09/2019    Pneumonia due to infectious organism 03/14/2024    PONV (postoperative nausea and vomiting)     Severe sepsis 11/22/2021    Shingles 2003 approx    left leg    Subclinical hypothyroidism 06/16/2023    Thrombocytopenia, unspecified 11/29/2021    Trouble in sleeping     Urinary incontinence      Past Surgical History:   Procedure Laterality Date    bladder implant Right 06/11/2024    BLADDER SURGERY  2015 approx    mesh - Dr Everett then 2nd reconstructive sx Dr Onofre    BREAST BIOPSY Bilateral     NEGATIVE    BREAST BIOPSY Right 10/31/2022    benign    BREAST LUMPECTOMY Left 2021    BREAST SURGERY Left 09/28/2015    Bx - benign    BREAST SURGERY Right 12/2015    Bx benign    CARDIAC PACEMAKER REMOVAL Left 06/26/2014    Pacer defirillator removed. Put in 1993 aat time of heart transplant    CARPAL TUNNEL RELEASE Left 03/03/2015    Dr. Hall    COLONOSCOPY N/A 02/25/2021    Procedure: COLONOSCOPY;  Surgeon: Freida Ramirez MD;  Location: [facility];  Service: Endoscopy;  Laterality: N/A;    CYSTOCELE REPAIR      Twice with mesh removal    EPIDURAL STEROID INJECTION INTO CERVICAL SPINE N/A 02/02/2023    Procedure: T11/T12 IL HELLEN;  Surgeon: Jassi Pierre  MD;  Location: [facility];  Service: Pain Management;  Laterality: N/A;    HEART TRANSPLANT  1993    HERNIA REPAIR Right 1971 approx    Inguinal    HYSTERECTOMY  1983    vag hyst /LSO     IMPLANTATION OF PERMANENT SACRAL NERVE STIMULATOR N/A 06/11/2024    Procedure: INSERTION, NEUROSTIMULATOR, PERMANENT, SACRAL;  Surgeon: Sami Cochran MD;  Location: [facility];  Service: Urology;  Laterality: N/A;    INCISION AND DRAINAGE OF ABSCESS Left 12/24/2021    Procedure: INCISION AND DRAINAGE, ABSCESS;  Surgeon: Joseph Longo MD;  Location: [facility];  Service: General;  Laterality: Left;    INJECTION OF ANESTHETIC AGENT AROUND MEDIAL BRANCH NERVES INNERVATING LUMBAR FACET JOINT Right 10/19/2022    Procedure: Right L4/L5 and L5/S1 MBB;  Surgeon: Jassi Pierre MD;  Location: [facility];  Service: Pain Management;  Laterality: Right;    INJECTION OF ANESTHETIC AGENT AROUND MEDIAL BRANCH NERVES INNERVATING LUMBAR FACET JOINT Right 11/09/2022    Procedure: Right L4/L5 and L5/S1 MBB;  Surgeon: Jassi Pierre MD;  Location: [facility];  Service: Pain Management;  Laterality: Right;    INJECTION OF ANESTHETIC AGENT INTO SACROILIAC JOINT Right 08/22/2022    Procedure: Right SIJ Injection Right L5/S1 Facte Injection;  Surgeon: Jassi Pierre MD;  Location: [facility];  Service: Pain Management;  Laterality: Right;    INSERTION OF TUNNELED CENTRAL VENOUS CATHETER (CVC) WITH SUBCUTANEOUS PORT N/A 11/09/2021    Procedure: [id number];  Surgeon: Christoph Douglas MD;  Location: [facility] OR;  Service: General;  Laterality: N/A;    RADIOFREQUENCY THERMOCOAGULATION Right 12/07/2022    Procedure: Right L4/L5 and L5/S1 Lumbar RFA;  Surgeon: Jassi Pierre MD;  Location: [facility];  Service: Pain Management;  Laterality: Right;    REMOVAL OF VASCULAR ACCESS PORT      ROBOT-ASSISTED LAPAROSCOPIC ABDOMINAL SACROCOLPOPEXY N/A 08/10/2023    Procedure: ROBOTIC SACROCOLPOPEXY, ABDOMEN;  Surgeon: PRANAY Villalobos  MD Luisito;  Location: [facility];  Service: OB/GYN;  Laterality: N/A;    ROBOT-ASSISTED LAPAROSCOPIC OOPHORECTOMY Right 08/10/2023    Procedure: ROBOTIC OOPHORECTOMY;  Surgeon: PRANAY Villalobos MD;  Location: [facility];  Service: OB/GYN;  Laterality: Right;    SENTINEL LYMPH NODE BIOPSY Left 10/12/2021    Procedure: BIOPSY, LYMPH NODE, SENTINEL;  Surgeon: Christoph Douglas MD;  Location: [facility];  Service: General;  Laterality: Left;    TOE SURGERY      XI ROBOTIC URETHROPEXY N/A 08/10/2023    Procedure: XI ROBOTIC URETHROPEXY;  Surgeon: PRANAY Villalobos MD;  Location: [facility];  Service: OB/GYN;  Laterality: N/A;     Family History   Problem Relation Name Age of Onset    Cancer Mother  38        breast    Breast cancer Mother      Breast cancer Maternal Grandmother      Heart disease Maternal Grandmother      Hypertension Son      Cataracts Cousin      Diabetes Neg Hx      Stroke Neg Hx      Kidney disease Neg Hx      Asthma Neg Hx      COPD Neg Hx      Melanoma Neg Hx      Hyperlipidemia Neg Hx       Social History     Tobacco Use    Smoking status: Never     Passive exposure: Never    Smokeless tobacco: Never   Substance Use Topics    Alcohol use: Never     Alcohol/week: 0.0 standard drinks of alcohol    Drug use: No     Review of Systems   All other systems reviewed and are negative.      Physical Exam     Initial Vitals [09/11/24 1031]   BP Pulse Resp Temp SpO2   (!) 140/71 92 19 98.5 °F (36.9 °C) 100 %      MAP       --         Physical Exam    Nursing Notes and Vital Signs Reviewed.  Constitutional:  Well developed, well nourished.  Awake & alert.  She is in no apparent distress  Head:  Atraumatic.  Normocephalic.    Eyes:  PERRL.  EOMI.  Conjunctivae are not pale. No scleral icterus.  ENT:  Mucous membranes are moist and intact.  Oropharynx is clear and symmetric.    Neck:  Supple.  No JVD.  No lymphadenopathy.  Cardiovascular:  Regular rate.  Regular rhythm.  No murmurs, rubs, or gallops.  Distal pulses are  2+ and symmetric.  Pulmonary/Chest:  No evidence of respiratory distress.  Clear to auscultation bilaterally.  No wheezing, rales or rhonchi.  Abdominal:  Soft and non-distended.  There is no tenderness.  No rebound, guarding, or rigidity.  No organomegaly.  Good bowel sounds.      Musculoskeletal:  No edema.   No cyanosis.  No clubbing.  Moves all four extremities.   No calf tenderness.  Skin:  Skin is warm and dry.      Neurological:  Alert, awake, and appropriate.  Normal speech.  No acute focal neurological deficits are appreciated.  Psychiatric:  Good eye contact.  Appropriate in content/context. Normal affect.      ED Course   Procedures  Labs Reviewed   CBC W/ AUTO DIFFERENTIAL - Abnormal       Result Value    WBC 2.61 (*)     RBC 2.97 (*)     Hemoglobin 8.8 (*)     Hematocrit 26.5 (*)     MCV 89      MCH 29.6      MCHC 33.2      RDW 16.7 (*)     Platelets 173      MPV 9.6      Immature Granulocytes 0.0      Gran # (ANC) 1.0 (*)     Immature Grans (Abs) 0.00      Lymph # 1.0      Mono # 0.5      Eos # 0.1      Baso # 0.01      nRBC 0      Gran % 38.3      Lymph % 38.3      Mono % 18.4 (*)     Eosinophil % 4.6      Basophil % 0.4      Differential Method Automated      Narrative:     Release to patient->Immediate   COMPREHENSIVE METABOLIC PANEL - Abnormal    Sodium 137      Potassium 5.2 (*)     Chloride 105      CO2 18 (*)     Glucose 98      BUN 58 (*)     Creatinine 4.1 (*)     Calcium 8.9      Total Protein 7.8      Albumin 3.6      Total Bilirubin 0.6      Alkaline Phosphatase 106      AST 42 (*)     ALT 29      eGFR 11 (*)     Anion Gap 14      Narrative:     Release to patient->Immediate   B-TYPE NATRIURETIC PEPTIDE - Abnormal     (*)     Narrative:     Release to patient->Immediate   PHOSPHORUS - Abnormal    Phosphorus 4.7 (*)     Narrative:     Release to patient->Immediate   HIV 1 / 2 ANTIBODY    HIV 1/2 Ag/Ab Negative      Narrative:     Release to patient->Immediate   HEPATITIS C ANTIBODY     Hepatitis C Ab Negative      Narrative:     Release to patient->Immediate   HEP C VIRUS HOLD SPECIMEN    HEP C Virus Hold Specimen Hold for HCV sendout      Narrative:     Release to patient->Immediate   TROPONIN I    Troponin I 0.010      Narrative:     Release to patient->Immediate   MAGNESIUM    Magnesium 2.2      Narrative:     Release to patient->Immediate     Results for orders placed or performed during the hospital encounter of 09/11/24   HIV 1/2 Ag/Ab (4th Gen)   Result Value Ref Range    HIV 1/2 Ag/Ab Negative Negative   Hepatitis C Antibody   Result Value Ref Range    Hepatitis C Ab Negative Negative   HCV Virus Hold Specimen   Result Value Ref Range    HEP C Virus Hold Specimen Hold for HCV sendout    CBC auto differential   Result Value Ref Range    WBC 2.61 (L) 3.90 - 12.70 K/uL    RBC 2.97 (L) 4.00 - 5.40 M/uL    Hemoglobin 8.8 (L) 12.0 - 16.0 g/dL    Hematocrit 26.5 (L) 37.0 - 48.5 %    MCV 89 82 - 98 fL    MCH 29.6 27.0 - 31.0 pg    MCHC 33.2 32.0 - 36.0 g/dL    RDW 16.7 (H) 11.5 - 14.5 %    Platelets 173 150 - 450 K/uL    MPV 9.6 9.2 - 12.9 fL    Immature Granulocytes 0.0 0.0 - 0.5 %    Gran # (ANC) 1.0 (L) 1.8 - 7.7 K/uL    Immature Grans (Abs) 0.00 0.00 - 0.04 K/uL    Lymph # 1.0 1.0 - 4.8 K/uL    Mono # 0.5 0.3 - 1.0 K/uL    Eos # 0.1 0.0 - 0.5 K/uL    Baso # 0.01 0.00 - 0.20 K/uL    nRBC 0 0 /100 WBC    Gran % 38.3 38.0 - 73.0 %    Lymph % 38.3 18.0 - 48.0 %    Mono % 18.4 (H) 4.0 - 15.0 %    Eosinophil % 4.6 0.0 - 8.0 %    Basophil % 0.4 0.0 - 1.9 %    Differential Method Automated    Comprehensive metabolic panel   Result Value Ref Range    Sodium 137 136 - 145 mmol/L    Potassium 5.2 (H) 3.5 - 5.1 mmol/L    Chloride 105 95 - 110 mmol/L    CO2 18 (L) 23 - 29 mmol/L    Glucose 98 70 - 110 mg/dL    BUN 58 (H) 8 - 23 mg/dL    Creatinine 4.1 (H) 0.5 - 1.4 mg/dL    Calcium 8.9 8.7 - 10.5 mg/dL    Total Protein 7.8 6.0 - 8.4 g/dL    Albumin 3.6 3.5 - 5.2 g/dL    Total Bilirubin 0.6 0.1 - 1.0 mg/dL     Alkaline Phosphatase 106 55 - 135 U/L    AST 42 (H) 10 - 40 U/L    ALT 29 10 - 44 U/L    eGFR 11 (A) >60 mL/min/1.73 m^2    Anion Gap 14 8 - 16 mmol/L   Troponin I #1   Result Value Ref Range    Troponin I 0.010 0.000 - 0.026 ng/mL   BNP   Result Value Ref Range     (H) 0 - 99 pg/mL   Magnesium   Result Value Ref Range    Magnesium 2.2 1.6 - 2.6 mg/dL   Phosphorus   Result Value Ref Range    Phosphorus 4.7 (H) 2.7 - 4.5 mg/dL   EKG 12-lead   Result Value Ref Range    QRS Duration 148 ms    [facility] QTC Calculation 533 ms     *Note: Due to a large number of results and/or encounters for the requested time period, some results have not been displayed. A complete set of results can be found in Results Review.       EKG Readings: (Independently Interpreted)   Initial Reading: No STEMI. Previous EKG: Compared with most recent EKG Previous EKG Date: 7/25/24. Rhythm: Normal Sinus Rhythm. Heart Rate: 91. Ectopy: PVCs. Conduction: Bifasicular. ST Segments: Non-Specific ST Segment Depression. T Waves Flipped: II, III and AVF. Clinical Impression: with PVCs Other Impression: No acute ischemic changes, no changes from prior ECG     ECG Results              EKG 12-lead (In process)        Collection Time Result Time QRS Duration [facility] QTC Calculation    09/11/24 10:51:10 09/11/24 11:47:03 148 533                     In process by Interface, Lab In [facility] (09/11/24 11:47:09)                   Narrative:    Test Reason : ABNORMAL LAB    Vent. Rate : 091 BPM     Atrial Rate : 091 BPM     P-R Int : 166 ms          QRS Dur : 148 ms      QT Int : 434 ms       P-R-T Axes : 010 -36 -21 degrees     QTc Int : 533 ms     Suspect unspecified pacemaker failure  Sinus rhythm with Premature supraventricular complexes  Possible Left atrial enlargement  Left axis deviation  Right bundle branch block  LVH  T wave abnormality, consider lateral ischemia  Abnormal ECG  When compared with ECG of 25-JUL-2024 16:24,  T wave inversion less evident  in Lateral leads    Referred By: [email or web address]   SELF           Confirmed By:                                   Imaging Results              X-Ray Chest AP Portable (Final result)  Result time 09/11/24 10:54:21      Final result by Omar Bella MD (09/11/24 10:54:21)                   Impression:      No acute findings.      Electronically signed by: Omar Bella MD  Date:    09/11/2024  Time:    10:54               Narrative:    EXAMINATION:  XR CHEST AP PORTABLE    CLINICAL HISTORY:  shortness of breath;    TECHNIQUE:  Single frontal view of the chest was performed.    COMPARISON:  07/19/2024    FINDINGS:  Previous median sternotomy.  Stable mild cardiomegaly.  Aortic atherosclerosis.    No acute infiltrates or effusions.  Stable linear band of scarring projected over the left mid lung.    No acute osseous findings.  No advanced arthritic changes.                                       Medications   sodium zirconium cyclosilicate packet 10 g (10 g Oral Given 9/11/24 1226)     Medical Decision Making  DDX: 1. Acute on chronic renal failure 2. Medication side effect 3. Lab hemolysis     ECG reviewed and no acute ishemich changes, unchanged from prior, CXR normal, h/h chronically low and at baseline, kidney function at baseline, potassium is 5.2 today, lokelma given, trop normal, BNP mildly elevated, no obvious evidence of over heart failure, admission initially considered but suspect yesterday's lab work was hemolyzed, patient overall stable for discharge, reasons to return given, renal diet discussed.     Amount and/or Complexity of Data Reviewed  External Data Reviewed: labs and notes.     Details: Nephrology clinic notes, outpatient lab work  Labs: ordered. Decision-making details documented in ED Course.  Radiology: ordered. Decision-making details documented in ED Course.  ECG/medicine tests: ordered and independent interpretation performed. Decision-making details documented in ED Course.    Risk  Prescription  drug management.  Decision regarding hospitalization.                           12:30 PM - Re-evaluation: The patient is resting comfortably and is in no acute distress. She states that her symptoms have improved after treatment within ER. Discussed test results, shared treatment plan, specific conditions for return, and importance of follow up with patient and family.  She understands and agrees with the plan as discussed. Answered  her questions at this time. She has remained hemodynamically stable throughout the ED course and is appropriate for discharge home.              Clinical Impression:  Final diagnoses:  [R06.02] Shortness of breath  [E87.5] Acute hyperkalemia (Primary)  [N18.4] CKD (chronic kidney disease) stage 4, GFR 15-29 ml/min  [D64.9] Chronic anemia  [Z94.1] History of heart transplant  [D84.821, Z79.899] Immunocompromised state due to drug therapy          ED Disposition Condition    Discharge Stable          ED Prescriptions    None       Follow-up Information       Follow up With Specialties Details Why Contact Info    Carter Crawford MD Nephrology Schedule an appointment as soon as possible for a visit in 2 days Return to the Emergency Room, If symptoms worsen [address]  [place] [id number]  [phone]               Leana Deshpande MD  09/14/24 0644

## 2024-09-12 ENCOUNTER — TELEPHONE (OUTPATIENT)
Dept: NEPHROLOGY | Facility: CLINIC | Age: 69
End: 2024-09-12
Payer: MEDICARE

## 2024-09-12 ENCOUNTER — TELEPHONE (OUTPATIENT)
Dept: ADMINISTRATIVE | Facility: CLINIC | Age: 69
End: 2024-09-12
Payer: MEDICARE

## 2024-09-12 ENCOUNTER — PATIENT OUTREACH (OUTPATIENT)
Dept: EMERGENCY MEDICINE | Facility: HOSPITAL | Age: 69
End: 2024-09-12
Payer: MEDICARE

## 2024-09-12 PROBLEM — I63.511 ACUTE ISCHEMIC RIGHT MIDDLE CEREBRAL ARTERY (MCA) STROKE: Status: RESOLVED | Noted: 2024-07-20 | Resolved: 2024-09-12

## 2024-09-12 PROBLEM — N30.00 ACUTE CYSTITIS WITHOUT HEMATURIA: Status: RESOLVED | Noted: 2022-05-10 | Resolved: 2024-09-12

## 2024-09-12 PROBLEM — E87.5 HYPERKALEMIA: Status: RESOLVED | Noted: 2018-09-17 | Resolved: 2024-09-12

## 2024-09-12 NOTE — TELEPHONE ENCOUNTER
Called and spoke to patient. Wanted to verify follow up appt with Dr Wick on 09/13/24 @ 0900 which was done. Pt appreciative of return call.

## 2024-09-12 NOTE — PROGRESS NOTES
Pt was seen in the ED on 9/11/24 for Shortness of breath; Acute hyperkalemia; CKD (chronic kidney disease) stage 4, GFR 15-29 ml/min; Chronic anemia; History of heart transplant; Immunocompromised state due to drug therapy. ED Navigator spoke with pt for Post ED visit follow up navigation to assist with scheduling a 7-day Post ED visit follow up appt. Pt states that she had contacted pcp to schedule a 7-day Post ED visit follow up appt and was scheduled on 9/13/24 @ 9:00 a.m. Closing Encounter.    Chelsea Monte

## 2024-09-12 NOTE — TELEPHONE ENCOUNTER
----- Message from Chelsea Monte sent at 9/12/2024 10:28 AM CDT -----  Regarding: Post ED visit follow up  Good morning,    Pt was seen in the ED on 9/11/24 for Shortness of breath; Acute hyperkalemia; CKD (chronic kidney disease) stage 4, GFR 15-29 ml/min; Chronic anemia; History of heart transplant; Immunocompromised state due to drug therapy. Pt is requesting to be contacted to discuss potassium level and what was given to her in the hospital to bring the level down. Please contact pt to discuss.    Thank you for your assistance,  Chelsea Monte

## 2024-09-12 NOTE — TELEPHONE ENCOUNTER
Attempted to call patient for PD tracker Day 5 query. No answer; left message on voicemail to return call to [phone] with callback info.

## 2024-09-13 ENCOUNTER — OFFICE VISIT (OUTPATIENT)
Dept: PRIMARY CARE CLINIC | Facility: CLINIC | Age: 69
End: 2024-09-13
Payer: MEDICARE

## 2024-09-13 VITALS
TEMPERATURE: 97 F | DIASTOLIC BLOOD PRESSURE: 72 MMHG | SYSTOLIC BLOOD PRESSURE: 128 MMHG | BODY MASS INDEX: 26.87 KG/M2 | HEIGHT: 62 IN | HEART RATE: 90 BPM | WEIGHT: 146 LBS | OXYGEN SATURATION: 98 %

## 2024-09-13 DIAGNOSIS — M79.7 FIBROMYALGIA: Chronic | ICD-10-CM

## 2024-09-13 DIAGNOSIS — I50.32 CHRONIC DIASTOLIC (CONGESTIVE) HEART FAILURE: Chronic | ICD-10-CM

## 2024-09-13 DIAGNOSIS — N18.4 CKD (CHRONIC KIDNEY DISEASE) STAGE 4, GFR 15-29 ML/MIN: Chronic | ICD-10-CM

## 2024-09-13 DIAGNOSIS — M54.6 CHRONIC RIGHT-SIDED THORACIC BACK PAIN: Chronic | ICD-10-CM

## 2024-09-13 DIAGNOSIS — K43.9 VENTRAL HERNIA WITHOUT OBSTRUCTION OR GANGRENE: ICD-10-CM

## 2024-09-13 DIAGNOSIS — I10 ESSENTIAL HYPERTENSION: Chronic | ICD-10-CM

## 2024-09-13 DIAGNOSIS — Z94.1 HEART TRANSPLANTED: Chronic | ICD-10-CM

## 2024-09-13 DIAGNOSIS — M48.07 SPINAL STENOSIS OF LUMBOSACRAL REGION: Primary | Chronic | ICD-10-CM

## 2024-09-13 DIAGNOSIS — G89.29 CHRONIC RIGHT-SIDED THORACIC BACK PAIN: Chronic | ICD-10-CM

## 2024-09-13 DIAGNOSIS — Q21.12 PATENT FORAMEN OVALE: ICD-10-CM

## 2024-09-13 PROBLEM — N18.5 CKD (CHRONIC KIDNEY DISEASE) STAGE 5, GFR LESS THAN 15 ML/MIN: Chronic | Status: ACTIVE | Noted: 2024-09-05

## 2024-09-13 PROCEDURE — 1125F AMNT PAIN NOTED PAIN PRSNT: CPT | Mod: HCNC,CPTII,S$GLB, | Performed by: INTERNAL MEDICINE

## 2024-09-13 PROCEDURE — 99999 PR PBB SHADOW E&M-EST. PATIENT-LVL IV: CPT | Mod: PBBFAC,HCNC,, | Performed by: INTERNAL MEDICINE

## 2024-09-13 PROCEDURE — 1160F RVW MEDS BY RX/DR IN RCRD: CPT | Mod: HCNC,CPTII,S$GLB, | Performed by: INTERNAL MEDICINE

## 2024-09-13 PROCEDURE — 3066F NEPHROPATHY DOC TX: CPT | Mod: HCNC,CPTII,S$GLB, | Performed by: INTERNAL MEDICINE

## 2024-09-13 PROCEDURE — 99215 OFFICE O/P EST HI 40 MIN: CPT | Mod: HCNC,S$GLB,, | Performed by: INTERNAL MEDICINE

## 2024-09-13 PROCEDURE — 99417 PROLNG OP E/M EACH 15 MIN: CPT | Mod: HCNC,S$GLB,, | Performed by: INTERNAL MEDICINE

## 2024-09-13 PROCEDURE — 3008F BODY MASS INDEX DOCD: CPT | Mod: HCNC,CPTII,S$GLB, | Performed by: INTERNAL MEDICINE

## 2024-09-13 PROCEDURE — 3044F HG A1C LEVEL LT 7.0%: CPT | Mod: HCNC,CPTII,S$GLB, | Performed by: INTERNAL MEDICINE

## 2024-09-13 PROCEDURE — 1111F DSCHRG MED/CURRENT MED MERGE: CPT | Mod: HCNC,CPTII,S$GLB, | Performed by: INTERNAL MEDICINE

## 2024-09-13 PROCEDURE — 1157F ADVNC CARE PLAN IN RCRD: CPT | Mod: HCNC,CPTII,S$GLB, | Performed by: INTERNAL MEDICINE

## 2024-09-13 PROCEDURE — 3078F DIAST BP <80 MM HG: CPT | Mod: HCNC,CPTII,S$GLB, | Performed by: INTERNAL MEDICINE

## 2024-09-13 PROCEDURE — 1159F MED LIST DOCD IN RCRD: CPT | Mod: HCNC,CPTII,S$GLB, | Performed by: INTERNAL MEDICINE

## 2024-09-13 PROCEDURE — 1101F PT FALLS ASSESS-DOCD LE1/YR: CPT | Mod: HCNC,CPTII,S$GLB, | Performed by: INTERNAL MEDICINE

## 2024-09-13 PROCEDURE — 3288F FALL RISK ASSESSMENT DOCD: CPT | Mod: HCNC,CPTII,S$GLB, | Performed by: INTERNAL MEDICINE

## 2024-09-13 PROCEDURE — 3074F SYST BP LT 130 MM HG: CPT | Mod: HCNC,CPTII,S$GLB, | Performed by: INTERNAL MEDICINE

## 2024-09-13 RX ORDER — ZOLPIDEM TARTRATE 5 MG/1
5 TABLET ORAL NIGHTLY
Qty: 90 TABLET | Refills: 1 | Status: SHIPPED | OUTPATIENT
Start: 2024-09-13 | End: 2025-03-12

## 2024-09-13 RX ORDER — TEMAZEPAM 30 MG/1
30 CAPSULE ORAL NIGHTLY PRN
Qty: 90 CAPSULE | Refills: 1 | Status: SHIPPED | OUTPATIENT
Start: 2024-09-13 | End: 2025-03-12

## 2024-09-13 RX ORDER — PREGABALIN 50 MG/1
50 CAPSULE ORAL 2 TIMES DAILY
Qty: 180 CAPSULE | Refills: 0 | Status: SHIPPED | OUTPATIENT
Start: 2024-09-13 | End: 2024-12-12

## 2024-09-13 RX ORDER — TIZANIDINE HYDROCHLORIDE 4 MG/1
4 CAPSULE, GELATIN COATED ORAL EVERY 6 HOURS PRN
COMMUNITY

## 2024-09-13 RX ORDER — FUROSEMIDE 20 MG/1
20 TABLET ORAL DAILY
Qty: 90 TABLET | Refills: 1
Start: 2024-09-13 | End: 2025-03-12

## 2024-09-13 NOTE — PATIENT INSTRUCTIONS
If you are feeling unwell, we'd like to be the first ones to know here at [facility]! Please give us a call. Same day appointments are our top priority to keep you well and out of the emergency rooms and hospitals. Call [phone] for our direct line. After hours advice is always available. Please call [phone] after hours to speak to the on-call team.      Recommend Covid and RSV vaccines that can be scheduled at your pharmacy of choice.  Recommend Flu vaccines that can be scheduled in the office or at your pharmacy of choice.

## 2024-09-13 NOTE — PROGRESS NOTES
Nadia Damon  09/13/2024  [id number]    Elis Wick MD  Patient Care Team:  Elis Wick MD as PCP - General (Internal Medicine)  Miguel Soni Jr., MD as Consulting Physician (Vascular Surgery)  Ike King MD as Consulting Physician (Cardiology)  Courtney Tubbs MD as Consulting Physician (Cardiology)  Carter Crawford MD as Consulting Physician (Nephrology)  Paxton Vasques OD as Consulting Physician (Optometry)  PRANAY Villalobos MD as Obstetrician (Obstetrics)  Parker Mccarthy IV, MD (Urology)  Ike King MD as Consulting Physician (Cardiology)  Jose Roland MD as Consulting Physician (Dermatology)  Prasanth Johnson MD as Consulting Physician (Rheumatology)  Elis Wick MD as Physician (Internal Medicine)  Lexi Bianchi LCSW as  (Hematology and Oncology)  Candace Saleh LMSW as  (Hematology and Oncology)  Kelsie Burk PA-C as Physician Assistant (Hematology and Oncology)  Chelsea Monte as ED Navigator    Visit Type: Follow-up    Ms. Damon is a 69 year old female here for scheduled f/u.     Ms. Damon suffered CVA 7/19/24 with subsequent hospitalization then transfer to [facility] to address on-going dysarthria and R-sided weakness. She also has severe spinal stenosis.    Has appointment w external Cardiologist Dr. Jessica HARPER/STACY 9/17/24 to f/u on PFO newly discovered following above recent CVA    History of Present Illness    CHIEF COMPLAINT:  Ms. Damon presents today for follow up.    SPINAL STENOSIS AND PAIN MANAGEMENT:  She reports severe pain on the right side due to spinal stenosis, affecting her function. She experiences spasms causing her body to shake on the affected side. The pain is localized to T11 and T12 vertebrae and runs lower back, hip, leg like sciatic pain. The pain significantly impacts her daily activities and mobility. She discontinued Percocet due to constipation. Also discontinued  Tramadol and Robaxin. Instead, she is using tizanidine (muscle relaxer) and Tylenol for pain management. She continues pregabalin (Lyrica) as prescribed and uses lidocaine patches every 12 hours as needed for pain relief. She is wary of taking excessive pain medication as she lives alone.  She is scheduled for spinal procedure with Interventional Pain Management on Monday Sept 16th.    RECENT HOSPITALIZATION AND KIDNEY FUNCTION:  Recent hospitalization, discharged Sept 1st, with a follow-up visit here with Dr. BEST Hernandez post-discharge. Primary issues were dehydration and GRACE. She received IV fluids and kidney treatment. Her levels improved with hydration and changes to her kidney treatment, coordinated between the hospital doctor and her nephrologist Dr. Crawford.    MEDICATIONS:  She takes Veltassa 8.4 g every other day and Lasix 20 mg daily. For sleep, she takes Ambien 5 mg nightly and temazepam 30 mg as needed.     URINARY AND BOWEL FUNCTION:  She reports following a schedule to urinate every two hours as a preventive measure as advised. She takes MiraLax to prevent constipation and reports daily bowel movements.    DIET:  She follows a prescribed renal diet and recently received 14 meals from [employer] specifically designed for her renal diet.    CARDIOVASCULAR ISSUES:  A Patent Foramen Ovale (PFO) was discovered during recent hospitalization for a stroke. She has an upcoming appointment with Cardiology, Dr. Hunter Lopez, Sept 17th for further evaluation. Her annual follow-up with the heart transplant team is scheduled for October 8th. She reports occasional palpitations, noting that sometimes she can feel her heart beating faster even when at rest.    HERNIA:  She reports a ventral hernia that is bulging and causing discomfort. She has an appointment scheduled with general surgery on October 9th to address this issue.    EDEMA:  She experiences swelling in her legs. She sometimes uses compression socks. She elevates  her legs at home.      REHABILITATION:  She will be resuming therapy three days a week once gets through all her medical appointments next week. Meanwhile, she continues with her prescribed therapy exercises at home despite current limitations due to pain. Her speech continues to improve - singing praise has helped a lot with her speech recovery. She reports stutters now only when nervous, anxious or feeling overwhelmed.    SOCIAL HISTORY:  She has a home health worker who assists with household tasks.         PHQ-4 Score: 0     From LOV w me 8/19/24  Got home 8/11 - AC broken - does have a couple of portable units   Has not been drinking enough water      Prior to CVA: Interstim placed R hip 6/11/24 w Urology Dr. Cochran - fell against area 3 days post-op w continued pain at the site.      Ms. Damon w h/o heart transplant 1993, on anti-rejection medication.   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 She was treated with 1 cycle of systemic chemotherapy cytoxan and taxotere and udenyca that was discontinued due to toxicity. She has been followed by radiation oncology and completed last radiation treatment 5/14/22. She is still followed by Heme/Onc for Epo, initiated for anemia due to stage 4 CKD.       Other medical issues include depresssion/anxiety/insomnia, hypertension, chronic diastolic CHF, and osteoporois for which she is receiving Prolia.      PHQ-4 Score: 0     Hosp/ED/Urgent Care:  9/11/24 ED Hyperkalemia  8/27-9/01/24 Hosp back pain/GRACE/dehydration  7/29-8/11/24 [facility]   7/19-7/29/24 Hosp CVA    Recent appointments:   9/10/24 Nephrology [facility]  9/05/24 [facility] f/u    Upcoming appointments:  Future Appointments       Next 10 Appointments       Date Provider Specialty Appt Notes    9/27/2024 Andrea Hernandez MD Cardiology dicharged from  rehab on 08/11  Requested to be added to waitlist for  earlier appt on 09/06    10/1/2024  Lab [facility]    10/8/2024 Carter Crawford MD Nephrology EP/RTC/1M/OVERBOOK PER [facility]/LABS/IDS    10/8/2024 Courtney Tubbs MD Transplant post heart transplant, Concerns regarding heart sounds    10/9/2024 Christoph Douglas MD Surgery 3 month f/u    10/16/2024 Elis Wick MD Primary Care One month f/u    10/17/2024 Kristine Delgado NP Breast Surgery exam only    10/23/2024  Lab [facility]    11/25/2024 Guicho Godinez MD Otolaryngology 3 month f/u sinus/Throat    1/16/2025  Lab Dr. Johnson              Displaying the next 10 appointments. This patient has additional appointments scheduled.           The following were reviewed: Active problem list, medication list, allergies, family history, social history, and Health Maintenance.     Medications have been reviewed and reconciled with patient at visit today.    Exam: Initial VS /82 P 98 temp 97.4 sat 98%  Vitals:    09/13/24 0948   BP: 128/72   Pulse:    Temp:      Weight: 66.2 kg (146 lb)   Body mass index is 26.7 kg/m².    BP Readings from Last 3 Encounters:   09/13/24 128/72   09/11/24 (!) 100/52   09/10/24 (!) 150/80      Wt Readings from Last 3 Encounters:   09/13/24 0852 66.2 kg (146 lb)   09/11/24 1031 (S) 66.6 kg (146 lb 13.2 oz)   09/10/24 1019 70.9 kg (156 lb 4.9 oz)      Physical Exam  Vitals reviewed.   Constitutional:       General: She is not in acute distress.     Appearance: Normal appearance.   HENT:      Head: Normocephalic and atraumatic.      Right Ear: External ear normal.      Left Ear: External ear normal.      Nose: Nose normal.      Mouth/Throat:      Mouth: Mucous membranes are moist.      Pharynx: Oropharynx is clear.   Eyes:      Extraocular Movements: Extraocular movements intact.      Conjunctiva/sclera: Conjunctivae normal.      Comments: glasses   Neck:      Vascular: No carotid bruit.   Cardiovascular:      Rate and Rhythm: Regular rhythm.      Heart sounds: No murmur heard.     Comments:  "Borderline tachycardia  Pulmonary:      Effort: Pulmonary effort is normal. No respiratory distress.      Breath sounds: No wheezing.      Comments: Some scattered "crackles" but generally good air movement  Abdominal:      Hernia: A hernia is present.   Musculoskeletal:      Comments: Wearing back brace  Slight B LE edema   Lymphadenopathy:      Cervical: No cervical adenopathy.   Skin:     General: Skin is warm and dry.      Findings: No rash.   Neurological:      Mental Status: She is alert and oriented to person, place, and time. Mental status is at baseline.      Gait: Gait abnormal.      Comments: Somewhat hoarse voice but speech much more fluent w minimal dysarthria   Psychiatric:         Mood and Affect: Mood normal.         Behavior: Behavior normal.         Thought Content: Thought content normal.         Judgment: Judgment normal.        Laboratory Reviewed  Lab Results   Component Value Date    WBC 2.61 (L) 09/11/2024    HGB 8.8 (L) 09/11/2024    HCT 26.5 (L) 09/11/2024     09/11/2024    MCV 89 09/11/2024    CHOL 142 07/20/2024    TRIG 85 07/20/2024    HDL 44 07/20/2024    LDLCALC 81.0 07/20/2024    ALT 29 09/11/2024    AST 42 (H) 09/11/2024     09/11/2024    K 5.2 (H) 09/11/2024     09/11/2024    CREATININE 4.1 (H) 09/11/2024    BUN 58 (H) 09/11/2024    CO2 18 (L) 09/11/2024    MG 2.2 09/11/2024    TSH 5.822 (H) 07/19/2024    FREET4 0.94 07/19/2024    PSA <0.1 05/27/2008    INR 1.0 07/20/2024    HGBA1C 5.1 07/02/2024    CRP 14.4 (H) 08/24/2023     Lab Results   Component Value Date    .2 (H) 05/20/2024    CALCIUM 8.9 09/11/2024    CAION 1.13 09/05/2018    PHOS 4.7 (H) 09/11/2024      Lab Results   Component Value Date    KZCQFYCT40 1373 (H) 06/20/2023     Lab Results   Component Value Date    FOLATE 20.0 06/20/2023      Lab Results   Component Value Date    IRON 83 08/30/2024    TRANSFERRIN 175 (L) 08/30/2024    TIBC 259 08/30/2024    FESATURATED 32 08/30/2024      Lab Results "   Component Value Date    EGFRNORACEVR 11 (A) 09/11/2024    ALBUMIN 3.6 09/11/2024     (H) 09/11/2024     Lab Results   Component Value Date    RXXXBYRD02AO 39 07/02/2024        Assessment:   69 y.o. female with multiple co-morbid illnesses here for continued follow up of medical problems.      The primary encounter diagnosis was Spinal stenosis of lumbosacral region. Diagnoses of Fibromyalgia, Heart transplanted, Essential hypertension, Patent foramen ovale, CKD (chronic kidney disease) stage 4, GFR 15-29 ml/min, Ventral hernia without obstruction or gangrene, Chronic diastolic (congestive) heart failure, and Chronic right-sided thoracic back pain were also pertinent to this visit.      Plan:   1. Spinal stenosis of lumbosacral region  Assessment & Plan:  Scheduled for spinal injection this coming Monday 9/16       2. Fibromyalgia  -     pregabalin (LYRICA) 50 MG capsule; Take 1 capsule (50 mg total) by mouth 2 (two) times daily.  Dispense: 180 capsule; Refill: 0    3. Heart transplanted  Overview:  5/93     Assessment & Plan:  F/u transplant team as scheduled      4. Essential hypertension  Assessment & Plan:  Repeat BP at goal - cont current Rx - maintain hydration      5. Patent foramen ovale  Overview:  Echo w bubble study 7/20/24     Left Ventricle: The left ventricle is normal in size. Normal wall thickness. There is normal systolic function with a visually estimated ejection fraction of 55 - 60%. There is normal diastolic function.    Right Ventricle: Normal right ventricular cavity size. Wall thickness is normal. Systolic function is normal.    Left Atrium: Patent foramen ovale visualized with predominant right to left shunting indicated by saline contrast.    Tricuspid Valve: There is mild regurgitation.    IVC/SVC: Normal venous pressure at 3 mmHg.    The patient is status post cardiac transplantation.    Assessment & Plan:  Recently discovered following recent hospitalization for CVA - long  standing heart transplant recipient - f/u w cardiology and w transplant team as scheduled       6. CKD (chronic kidney disease) stage 4, GFR 15-29 ml/min  Overview:  US Retro   5/16/2018---Mild cortical thinning and increased cortical echogenicity.  Findings can be seen with medical renal disease.  8/31/216--- Echogenic kidneys with diffuse cortical thinning suggesting  medical renal disease. 2 complex right renal cortical cysts.    Assessment & Plan:  Most recent eGFR < 15 - cont Veltassa, furosemide and close f/u w nephrology      7. Ventral hernia without obstruction or gangrene  Overview:  CT abd Jan 2023    Assessment & Plan:  Pt reports increased bulging and discomfort - Has upcoming surgical consult - will likely need to improve blood count prior to to any elective surgery       8. Chronic diastolic (congestive) heart failure  Overview:      Echocardiogram 5/28/24   The estimated pulmonary artery systolic pressure is 53 mmHg.   Left Ventricle: The left ventricle is normal in size. Normal wall thickness. There is mild asymmetric hypertrophy. Normal wall motion. Septal flattening in systole consistent with right ventricular pressure overload. There is normal systolic function with a visually estimated ejection fraction of 55 - 60%. There is diastolic dysfunction but grade cannot be determined.    Assessment & Plan:  Normal diastolic function noted on most recent Echo bubble study however noted 9/11/24 BNP of 491 - she does have advance CKD (could account for elevated BNP?) f/u w cardiology and transplant to reconcile conflicting echo reports      9. Chronic right-sided thoracic back pain  Assessment & Plan:  Cont current Rx - scheduled for Interventional pain procedure Monday      Other orders  -     furosemide (LASIX) 20 MG tablet; Take 1 tablet (20 mg total) by mouth once daily. HOLD UNTIL FOLLOW UP WITH PCP  Dispense: 90 tablet; Refill: 1  -     zolpidem (AMBIEN) 5 MG Tab; Take 1 tablet (5 mg total) by mouth  every evening.  Dispense: 90 tablet; Refill: 1  -     temazepam (RESTORIL) 30 mg capsule; Take 1 capsule (30 mg total) by mouth nightly as needed for Insomnia. FOR INSOMNIA  Dispense: 90 capsule; Refill: 1         Health Maintenance         Date Due Completion Date    RSV Vaccine (Age 60+ and Pregnant patients) (1 - 1-dose 60+ series) Never done ---    Influenza Vaccine (1) 09/01/2024 11/2/2023    COVID-19 Vaccine (7 - 2023-24 season) 09/01/2024 4/27/2023    Mammogram 04/18/2025 4/18/2024    Lipid Panel 07/20/2025 7/20/2024    Colorectal Cancer Screening 02/25/2026 2/25/2021    Hemoglobin A1c (Diabetic Prevention Screening) 07/02/2027 7/2/2024    DEXA Scan 07/03/2027 7/3/2024    TETANUS VACCINE 09/09/2030 9/9/2020            -Patient's lab results were reviewed and discussed with patient  -Treatment options and alternatives were discussed with the patient. Patient expressed understanding. Patient was given the opportunity to ask questions and be an active participant in their medical care. Patient had no further questions or concerns at this time.     Follow up: Follow up in about 1 month (around 10/13/2024) for Follow Up.    After visit summary printed and given to patient upon discharge.  Patient care plan included in After visit summary.    TOTAL TIME evaluating and managing this patient for this encounter was 77 minutes. This time was spent personally by me on some of the following activities: review of patient's past medical history, assessing age-appropriate health maintenance needs, review of any interval history, review and interpretation of lab results, review and interpretation of imaging test results, review and interpretation of cardiology test results, reviewing consulting specialist notes, obtaining history from the patient and family, examination of the patient, medication reconciliation, managing and/or ordering prescription medications, ordering imaging tests, ordering referral to subspecialty  provider(s), educating patient and answering their questions about diagnosis, treatment plan, and goals of treatment, discussing planned follow-up and final documentation of the visit. This time was exclusive of any separately billable procedures for this patient and exclusive of time spent treating any other patients.     This note was generated with the assistance of ambient listening technology. Verbal consent was obtained by the patient and accompanying visitor(s) for the recording of patient appointment to facilitate this note. I attest to having reviewed and edited the generated note for accuracy, though some syntax or spelling errors may persist. Please contact the author of this note for any clarification.

## 2024-09-13 NOTE — PRE-PROCEDURE INSTRUCTIONS
Spoke with patient regarding procedure scheduled on 9.16     Arrival time 1230     Has patient been sick with fever or on antibiotics within the last 7 days? No-pt currently at ED follow up visit with Dr. Wick and MD stated that patient is ok to have her procedure     Does the patient have any open wounds, sores or rashes? No     Does the patient have any recent fractures? no     Has patient received a vaccination within the last 7 days? No     Received the COVID vaccination?      Has the patient stopped all medications as directed? na-may continue     Does patient have a pacemaker, defibrillator, or implantable stimulator? No     Does the patient have a ride to and from procedure and someone reliable to remain with patient?  friend     Is the patient diabetic? no     Does the patient have sleep apnea? Or use O2 at home? no     Is the patient receiving sedation?      Is the patient instructed to remain NPO beginning at midnight the night before their procedure? yes     Procedure location confirmed with patient? Yes     Covid- Denies signs/symptoms. Instructed to notify PAT/MD if any changes.

## 2024-09-14 LAB
OHS QRS DURATION: 148 MS
OHS QTC CALCULATION: 533 MS

## 2024-09-14 NOTE — ASSESSMENT & PLAN NOTE
Normal diastolic function noted on most recent Echo bubble study however noted 9/11/24 BNP of 491 - she does have advance CKD (could account for elevated BNP?) f/u w cardiology and transplant to reconcile conflicting echo reports

## 2024-09-14 NOTE — ASSESSMENT & PLAN NOTE
Pt reports increased bulging and discomfort - Has upcoming surgical consult - will likely need to improve blood count prior to to any elective surgery

## 2024-09-14 NOTE — TELEPHONE ENCOUNTER
Spoke with pt on Wednesday and discussed her having to go to the ER due to her Potassium being elevated.

## 2024-09-14 NOTE — ASSESSMENT & PLAN NOTE
Recently discovered following recent hospitalization for CVA - long standing heart transplant recipient - f/u w cardiology and w transplant team as scheduled

## 2024-09-16 ENCOUNTER — HOSPITAL ENCOUNTER (OUTPATIENT)
Facility: HOSPITAL | Age: 69
Discharge: HOME | End: 2024-09-16
Attending: ANESTHESIOLOGY | Admitting: ANESTHESIOLOGY
Payer: MEDICARE

## 2024-09-16 VITALS
HEART RATE: 83 BPM | TEMPERATURE: 97 F | WEIGHT: 151.88 LBS | SYSTOLIC BLOOD PRESSURE: 102 MMHG | OXYGEN SATURATION: 100 % | RESPIRATION RATE: 18 BRPM | BODY MASS INDEX: 27.95 KG/M2 | DIASTOLIC BLOOD PRESSURE: 58 MMHG | HEIGHT: 62 IN

## 2024-09-16 DIAGNOSIS — M54.14 THORACIC RADICULOPATHY: ICD-10-CM

## 2024-09-16 PROCEDURE — 25000003 PHARM REV CODE 250: Mod: HCNC | Performed by: ANESTHESIOLOGY

## 2024-09-16 PROCEDURE — 63600175 PHARM REV CODE 636 W HCPCS: Mod: HCNC | Performed by: ANESTHESIOLOGY

## 2024-09-16 PROCEDURE — 25500020 PHARM REV CODE 255: Mod: HCNC | Performed by: ANESTHESIOLOGY

## 2024-09-16 PROCEDURE — 62321 NJX INTERLAMINAR CRV/THRC: CPT | Mod: HCNC,,, | Performed by: ANESTHESIOLOGY

## 2024-09-16 PROCEDURE — 62321 NJX INTERLAMINAR CRV/THRC: CPT | Mod: HCNC | Performed by: ANESTHESIOLOGY

## 2024-09-16 RX ORDER — BETAMETHASONE SODIUM PHOSPHATE AND BETAMETHASONE ACETATE 3; 3 MG/ML; MG/ML
INJECTION, SUSPENSION INTRA-ARTICULAR; INTRALESIONAL; INTRAMUSCULAR; SOFT TISSUE
Status: DISCONTINUED | OUTPATIENT
Start: 2024-09-16 | End: 2024-09-16 | Stop reason: HOSPADM

## 2024-09-16 RX ORDER — MIDAZOLAM HYDROCHLORIDE 1 MG/ML
INJECTION INTRAMUSCULAR; INTRAVENOUS
Status: DISCONTINUED | OUTPATIENT
Start: 2024-09-16 | End: 2024-09-16 | Stop reason: HOSPADM

## 2024-09-16 RX ORDER — FENTANYL CITRATE 50 UG/ML
INJECTION, SOLUTION INTRAMUSCULAR; INTRAVENOUS
Status: DISCONTINUED | OUTPATIENT
Start: 2024-09-16 | End: 2024-09-16 | Stop reason: HOSPADM

## 2024-09-16 RX ORDER — LIDOCAINE HYDROCHLORIDE 10 MG/ML
INJECTION, SOLUTION EPIDURAL; INFILTRATION; INTRACAUDAL; PERINEURAL
Status: DISCONTINUED | OUTPATIENT
Start: 2024-09-16 | End: 2024-09-16 | Stop reason: HOSPADM

## 2024-09-16 RX ORDER — ONDANSETRON HYDROCHLORIDE 2 MG/ML
4 INJECTION, SOLUTION INTRAVENOUS ONCE AS NEEDED
Status: DISCONTINUED | OUTPATIENT
Start: 2024-09-16 | End: 2024-09-16 | Stop reason: HOSPADM

## 2024-09-16 NOTE — DISCHARGE INSTRUCTIONS

## 2024-09-16 NOTE — H&P
HPI  Patient presenting for Procedure(s) (LRB):  T11/T12 IL HELLEN (N/A)     Patient on Anti-coagulation Yes, ASA, may continue    No health changes since previous encounter    Past Medical History:   Diagnosis Date    Abdominal wall hernia     CT Renal 6/11/2018---Small fat containing superior ventral abdominal wall hernia at the epicardial pacing lead site.    Abnormal mammogram 10/12/2021    Acute cystitis without hematuria 05/10/2022    Acute ischemic right middle cerebral artery (MCA) stroke 07/20/2024    GRACE (acute kidney injury) 11/22/2021    Anxiety     Arthritis     ZEN HIPS    Breast cancer in female 08/2021    LEFT BREAST    Bronchitis 08/18/2016    Never smoked      C. difficile colitis 11/29/2021    Cellulitis of axilla, left 12/23/2021    Chronic diastolic heart failure 12/16/2021    Chronic kidney disease     stage 4, GFR 15-29 ml/min    Chronic midline low back pain without sciatica 06/18/2018    Closed nondisplaced fracture of distal phalanx of left great toe with routine healing 10/22/2018    Coronary artery disease 1993    heart transplant    COVID-19 in immunocompromised patient 02/26/2024    Cystitis 05/10/2022    Depression     Encounter for blood transfusion     Fibromyalgia     on Lyrica    Heart failure     native heart cardiomyopathy    Heart transplanted 1993    due to cardiomyopathy    History of hyperparathyroidism; Hyperparathyroidism, secondary renal     PT DENIES    Hypertension     Immune disorder     anti rejection meds    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    Impaired mobility 07/28/2022    Iron deficiency anemia 08/15/2017    Kidney stones     passed per pt    Obesity     Obesity (BMI 30.0-34.9) 07/22/2019    Other osteoporosis without current pathological fracture 08/30/2019    Other pancytopenia 05/06/2024    Parotitis, acute 09/19/2023    Pharyngitis 01/09/2019    Pneumonia due to infectious organism 03/14/2024    PONV (postoperative nausea and vomiting)      Severe sepsis 11/22/2021    Shingles 2003 approx    left leg    Subclinical hypothyroidism 06/16/2023    Thrombocytopenia, unspecified 11/29/2021    Trouble in sleeping     Urinary incontinence      Past Surgical History:   Procedure Laterality Date    bladder implant Right 06/11/2024    BLADDER SURGERY  2015 approx    mesh - Dr Everett then 2nd reconstructive sx Dr Onofre    BREAST BIOPSY Bilateral     NEGATIVE    BREAST BIOPSY Right 10/31/2022    benign    BREAST LUMPECTOMY Left 2021    BREAST SURGERY Left 09/28/2015    Bx - benign    BREAST SURGERY Right 12/2015    Bx benign    CARDIAC PACEMAKER REMOVAL Left 06/26/2014    Pacer defirillator removed. Put in 1993 aat time of heart transplant    CARPAL TUNNEL RELEASE Left 03/03/2015    Dr. Hall    COLONOSCOPY N/A 02/25/2021    Procedure: COLONOSCOPY;  Surgeon: Freida Ramirez MD;  Location: [facility];  Service: Endoscopy;  Laterality: N/A;    CYSTOCELE REPAIR      Twice with mesh removal    EPIDURAL STEROID INJECTION INTO CERVICAL SPINE N/A 02/02/2023    Procedure: T11/T12 IL HELLEN;  Surgeon: Jassi Pierre MD;  Location: [facility] PAIN [facility]GT;  Service: Pain Management;  Laterality: N/A;    HEART TRANSPLANT  1993    HERNIA REPAIR Right 1971 approx    Inguinal    HYSTERECTOMY  1983    vag hyst /LSO     IMPLANTATION OF PERMANENT SACRAL NERVE STIMULATOR N/A 06/11/2024    Procedure: INSERTION, NEUROSTIMULATOR, PERMANENT, SACRAL;  Surgeon: Sami Cochran MD;  Location: [facility] OR;  Service: Urology;  Laterality: N/A;    INCISION AND DRAINAGE OF ABSCESS Left 12/24/2021    Procedure: INCISION AND DRAINAGE, ABSCESS;  Surgeon: Joseph Longo MD;  Location: [facility] OR;  Service: General;  Laterality: Left;    INJECTION OF ANESTHETIC AGENT AROUND MEDIAL BRANCH NERVES INNERVATING LUMBAR FACET JOINT Right 10/19/2022    Procedure: Right L4/L5 and L5/S1 MBB;  Surgeon: Jassi Pierre MD;  Location: [facility]IN [facility]GT;  Service: Pain Management;  Laterality: Right;    INJECTION  OF ANESTHETIC AGENT AROUND MEDIAL BRANCH NERVES INNERVATING LUMBAR FACET JOINT Right 11/09/2022    Procedure: Right L4/L5 and L5/S1 MBB;  Surgeon: Jassi Pierre MD;  Location: [facility] PAIN MGT;  Service: Pain Management;  Laterality: Right;    INJECTION OF ANESTHETIC AGENT INTO SACROILIAC JOINT Right 08/22/2022    Procedure: Right SIJ Injection Right L5/S1 Facte Injection;  Surgeon: Jassi Pierre MD;  Location: [facility] PAIN [facility]GT;  Service: Pain Management;  Laterality: Right;    INSERTION OF TUNNELED CENTRAL VENOUS CATHETER (CVC) WITH SUBCUTANEOUS PORT N/A 11/09/2021    Procedure: [id number];  Surgeon: Christoph Douglas MD;  Location: [facility] OR;  Service: General;  Laterality: N/A;    RADIOFREQUENCY THERMOCOAGULATION Right 12/07/2022    Procedure: Right L4/L5 and L5/S1 Lumbar RFA;  Surgeon: Jassi Pierre MD;  Location: [facility] PAIN [facility]GT;  Service: Pain Management;  Laterality: Right;    REMOVAL OF VASCULAR ACCESS PORT      ROBOT-ASSISTED LAPAROSCOPIC ABDOMINAL SACROCOLPOPEXY N/A 08/10/2023    Procedure: ROBOTIC SACROCOLPOPEXY, ABDOMEN;  Surgeon: PRANAY Villalobos MD;  Location: [facility] OR;  Service: OB/GYN;  Laterality: N/A;    ROBOT-ASSISTED LAPAROSCOPIC OOPHORECTOMY Right 08/10/2023    Procedure: ROBOTIC OOPHORECTOMY;  Surgeon: PRANAY Villalobos MD;  Location: [facility] OR;  Service: OB/GYN;  Laterality: Right;    SENTINEL LYMPH NODE BIOPSY Left 10/12/2021    Procedure: BIOPSY, LYMPH NODE, SENTINEL;  Surgeon: Christoph Douglas MD;  Location: [facility] OR;  Service: General;  Laterality: Left;    TOE SURGERY      XI ROBOTIC URETHROPEXY N/A 08/10/2023    Procedure: XI ROBOTIC URETHROPEXY;  Surgeon: PRANAY Villalobos MD;  Location: [facility] OR;  Service: OB/GYN;  Laterality: N/A;     Review of patient's allergies indicates:   Allergen Reactions    Lisinopril Swelling and Rash    Hydrocodone-acetaminophen Nausea Only    Augmentin [amoxicillin-pot clavulanate] Diarrhea    Zyvox [linezolid] Nausea And Vomiting        No  "current facility-administered medications on file prior to encounter.     Current Outpatient Medications on File Prior to Encounter   Medication Sig Dispense Refill    aspirin (ECOTRIN) 81 MG EC tablet Take 1 tablet (81 mg total) by mouth once daily.      carvediloL (COREG) 6.25 MG tablet Take 1 tablet (6.25 mg total) by mouth 2 (two) times daily with meals. Hold parameters: SBP less than 110 and/or DBP less than 75 180 tablet 3    cycloSPORINE modified, NEORAL, (NEORAL) 25 MG capsule Take 3 capsules (75 mg total) by mouth 2 (two) times daily. 540 capsule 1    pantoprazole (PROTONIX) 20 MG tablet TAKE 1 TABLET ONE TIME DAILY 90 tablet 3        PMHx, PSHx, Allergies, Medications reviewed in epic    ROS negative except pain complaints in HPI    OBJECTIVE:    /62 (BP Location: Right arm, Patient Position: Sitting)   Pulse 88   Temp 97.4 °F (36.3 °C) (Temporal)   Resp 15   Ht 5' 2" (1.575 m)   Wt 68.9 kg (151 lb 14.4 oz)   LMP 06/20/1983 (Within Years)   SpO2 100%   Breastfeeding No   BMI 27.78 kg/m²     PHYSICAL EXAMINATION:    GENERAL: Well appearing, in no acute distress, alert and oriented x3.  PSYCH:  Mood and affect appropriate.  SKIN: Skin color, texture, turgor normal, no rashes or lesions which will impact the procedure.  CV: RRR with palpation of the radial artery.  PULM: No evidence of respiratory difficulty, symmetric chest rise. Clear to auscultation.  NEURO: Cranial nerves grossly intact.    Plan:    Proceed with procedure as planned Procedure(s) (LRB):  T11/T12 IL HELLEN (N/A)    Jassi Pierre MD  09/16/2024            "

## 2024-09-16 NOTE — OP NOTE
Thoracic Interlaminar Epidural Steroid Injection under Fluoroscopic Guidance.     INFORMED CONSENT: The procedure, risks, benefits and options were discussed with patient. There are no contraindications to the procedure. The patient expressed understanding and agreed to proceed. The personnel performing the procedure was discussed.    Date of procedure 09/16/2024    Time-out taken to identify patient and procedure side prior to starting the procedure.                     PROCEDURE:    T11/T12  Thoracic Interlaminar Epidural Steroid Injection Under Fluoroscopic Guidance.     Pre-Op diagnosis: Lumbar radiculopathy [M54.16]    Post-Op diagnosis: Lumbar radiculopathy [M54.16]    PHYSICIAN: Jassi Pierre MD    ASSISTANTS: None     ESTIMATED BLOOD LOSS: none.     COMPLICATIONS: none.     SPECIMENS: none    Sedation: Conscious sedation provided by M.D    SEDATION MEDICATIONS: local/IV sedation: Versed 2 mg and fentanyl 50 mcg IV.  Conscious sedation ordered by MD.  Patient reevaluated and sedation administered by MD and monitored by RN.  Total sedation time was less than 10 min.      TECHNIQUE: With the patient laying in a prone position, the area was prepped and draped in the usual sterile fashion using ChloraPrep and a fenestrated drape. 1% lidocaine was given using a 27-gauge needle by raising a wheal and going down to the hub of the needle over the  T11/T12  interlaminar space.  The interlaminar space was then approached with a 3.5 inch 18-gauge Touhy needle was introduced under fluoroscopic guidance in the AP and Lateral view. Once the Ligamentum flavum was encountered loss of resistance to saline was used to enter the epidural space. With positive loss of resistance and negative CSF or Blood, 3mL contrast dye Omnipaque (300mg/ml) was injected to confirm placement and there was no vascular runoff. 2ml of Betamethasone PF 6mg/ml and 3ml of Lidocaine PF 1%. Displacement of the radiopaque contrast after injection of  the medication confirmed that the medication went into the area of the epidural space.  The patient tolerated the procedure well.       The patient was monitored for approximately 30 minutes after the procedure.  Patient was given post procedure and discharge instructions to follow at home.  The patient was discharged in a stable condition

## 2024-09-16 NOTE — DISCHARGE SUMMARY
Discharge Note  Short Stay      SUMMARY     Admit Date: 9/16/2024    Attending Physician: Jassi Pierre MD        Discharge Physician: Jassi Pierre MD        Discharge Date: 9/16/2024 2:03 PM    Procedure(s) (LRB):  T11/T12 IL HELLEN (N/A)    Final Diagnosis: Lumbar radiculopathy [M54.16]    Disposition: Home or self care    Patient Instructions:   Discharge Medication List as of 9/16/2024 12:30 PM        CONTINUE these medications which have NOT CHANGED    Details   aspirin (ECOTRIN) 81 MG EC tablet Take 1 tablet (81 mg total) by mouth once daily., Starting Tue 7/30/2024, Until Wed 7/30/2025, No Print      carvediloL (COREG) 6.25 MG tablet Take 1 tablet (6.25 mg total) by mouth 2 (two) times daily with meals. Hold parameters: SBP less than 110 and/or DBP less than 75, Starting Mon 7/29/2024, Until Tue 7/29/2025, Normal      hydrALAZINE (APRESOLINE) 25 MG tablet Take 25 mg by mouth every 8 (eight) hours., Historical Med      NIFEdipine (PROCARDIA-XL) 30 MG (OSM) 24 hr tablet Take 1 tablet (30 mg total) by mouth once daily., Starting Sun 9/1/2024, No Print      cycloSPORINE modified, NEORAL, (NEORAL) 25 MG capsule Take 3 capsules (75 mg total) by mouth 2 (two) times daily., Starting Wed 5/1/2024, Until Mon 10/28/2024, Normal      furosemide (LASIX) 20 MG tablet Take 1 tablet (20 mg total) by mouth once daily. HOLD UNTIL FOLLOW UP WITH PCP, Starting Fri 9/13/2024, Until Wed 3/12/2025, No Print      pantoprazole (PROTONIX) 20 MG tablet TAKE 1 TABLET ONE TIME DAILY, Starting Thu 6/13/2024, Normal      patiromer calcium sorbitex (VELTASSA) 8.4 gram PwPk Take 1 packet (8.4 g total) by mouth every other day., Starting Tue 9/10/2024, Normal      polyethylene glycol (GLYCOLAX) 17 gram PwPk Take 17 g by mouth once daily., Historical Med      pregabalin (LYRICA) 50 MG capsule Take 1 capsule (50 mg total) by mouth 2 (two) times daily., Starting Fri 9/13/2024, Until Thu 12/12/2024, Normal      raloxifene (EVISTA) 60 mg  tablet Take 60 mg by mouth once daily., Historical Med      sodium bicarbonate 650 MG tablet Take 1 tablet (650 mg total) by mouth 2 (two) times daily., Starting Sun 9/1/2024, Normal      temazepam (RESTORIL) 30 mg capsule Take 1 capsule (30 mg total) by mouth nightly as needed for Insomnia. FOR INSOMNIA, Starting Fri 9/13/2024, Until Wed 3/12/2025 at 2359, Normal      tiZANidine 4 mg Cap Take 4 mg by mouth every 6 (six) hours as needed (muscle spasm)., Historical Med      vitamin E 400 UNIT capsule Take 400 Units by mouth once daily., Historical Med      zolpidem (AMBIEN) 5 MG Tab Take 1 tablet (5 mg total) by mouth every evening., Starting Fri 9/13/2024, Until Wed 3/12/2025, Normal                 Discharge Diagnosis: Lumbar radiculopathy [M54.16]  Condition on Discharge: Stable with no complications to procedure   Diet on Discharge: Same as before.  Activity: as per instruction sheet.  Discharge to: Home with a responsible adult.  Follow up: 2-4 weeks       Please call the office at [phone] if you experience any weakness or loss of sensation, fever > 101.5, pain uncontrolled with oral medications, persistent nausea/vomiting/or diarrhea, redness or drainage from the incisions, or any other worrisome concerns. If physician on call was not reached or could not communicate with our office for any reason please go to the nearest emergency department

## 2024-09-19 ENCOUNTER — TELEPHONE (OUTPATIENT)
Dept: PRIMARY CARE CLINIC | Facility: CLINIC | Age: 69
End: 2024-09-19
Payer: MEDICARE

## 2024-09-19 NOTE — TELEPHONE ENCOUNTER
Call to check pt's general status following recent procedure on 09/16/2024. Pt reports procedure went well, results pending next week. Had PT visit yesterday and due to see Dr. Lopez, cardiology tomorrow 09/20/2024 at 0800. Taking a muscle relaxer and tylenol occasionally, pain controlled. No voiced needs, next appt at [facility] 10/16/2024.

## 2024-09-23 PROBLEM — Z09 HOSPITAL DISCHARGE FOLLOW-UP: Status: RESOLVED | Noted: 2024-06-19 | Resolved: 2024-09-23

## 2024-09-30 ENCOUNTER — TELEPHONE (OUTPATIENT)
Dept: NEPHROLOGY | Facility: CLINIC | Age: 69
End: 2024-09-30
Payer: MEDICARE

## 2024-09-30 NOTE — TELEPHONE ENCOUNTER
"Returned call and she asked if she can drink a glucerna daily since she has to take her meds early in the am. Dr cerrato said yes she can. " 9/30/24/sf  "

## 2024-09-30 NOTE — TELEPHONE ENCOUNTER
----- Message from Med Assistant Dorman sent at 9/30/2024  1:56 PM CDT -----  Contact: Nadia @ [phone]  The patient calling to speak with staff about her medication and what is going on with her. Says she was in the hospital 3-4 weeks ago for 6 days for dehydration and kidney failure but she want to know if the provider thinks one of her medications could be the cause. Please give her a call back at [phone].

## 2024-10-01 ENCOUNTER — LAB VISIT (OUTPATIENT)
Dept: LAB | Facility: HOSPITAL | Age: 69
End: 2024-10-01
Attending: INTERNAL MEDICINE
Payer: MEDICARE

## 2024-10-01 DIAGNOSIS — N18.4 STAGE 4 CHRONIC KIDNEY DISEASE: ICD-10-CM

## 2024-10-01 LAB
ALBUMIN SERPL BCP-MCNC: 3.3 G/DL (ref 3.5–5.2)
ANION GAP SERPL CALC-SCNC: 11 MMOL/L (ref 8–16)
BASOPHILS # BLD AUTO: 0.02 K/UL (ref 0–0.2)
BASOPHILS NFR BLD: 0.6 % (ref 0–1.9)
BUN SERPL-MCNC: 54 MG/DL (ref 8–23)
CALCIUM SERPL-MCNC: 8.9 MG/DL (ref 8.7–10.5)
CHLORIDE SERPL-SCNC: 107 MMOL/L (ref 95–110)
CO2 SERPL-SCNC: 22 MMOL/L (ref 23–29)
CREAT SERPL-MCNC: 4 MG/DL (ref 0.5–1.4)
DIFFERENTIAL METHOD BLD: ABNORMAL
EOSINOPHIL # BLD AUTO: 0.2 K/UL (ref 0–0.5)
EOSINOPHIL NFR BLD: 6.7 % (ref 0–8)
ERYTHROCYTE [DISTWIDTH] IN BLOOD BY AUTOMATED COUNT: 15.1 % (ref 11.5–14.5)
EST. GFR  (NO RACE VARIABLE): 11.5 ML/MIN/1.73 M^2
GLUCOSE SERPL-MCNC: 79 MG/DL (ref 70–110)
HCT VFR BLD AUTO: 25.9 % (ref 37–48.5)
HGB BLD-MCNC: 8.1 G/DL (ref 12–16)
IMM GRANULOCYTES # BLD AUTO: 0.01 K/UL (ref 0–0.04)
IMM GRANULOCYTES NFR BLD AUTO: 0.3 % (ref 0–0.5)
LYMPHOCYTES # BLD AUTO: 0.9 K/UL (ref 1–4.8)
LYMPHOCYTES NFR BLD: 26.1 % (ref 18–48)
MCH RBC QN AUTO: 28.6 PG (ref 27–31)
MCHC RBC AUTO-ENTMCNC: 31.3 G/DL (ref 32–36)
MCV RBC AUTO: 92 FL (ref 82–98)
MONOCYTES # BLD AUTO: 0.7 K/UL (ref 0.3–1)
MONOCYTES NFR BLD: 21.2 % (ref 4–15)
NEUTROPHILS # BLD AUTO: 1.6 K/UL (ref 1.8–7.7)
NEUTROPHILS NFR BLD: 45.1 % (ref 38–73)
NRBC BLD-RTO: 0 /100 WBC
PHOSPHATE SERPL-MCNC: 4.3 MG/DL (ref 2.7–4.5)
PLATELET # BLD AUTO: 185 K/UL (ref 150–450)
PMV BLD AUTO: 10.8 FL (ref 9.2–12.9)
POTASSIUM SERPL-SCNC: 5.7 MMOL/L (ref 3.5–5.1)
PTH-INTACT SERPL-MCNC: 423.3 PG/ML (ref 9–77)
RBC # BLD AUTO: 2.83 M/UL (ref 4–5.4)
SODIUM SERPL-SCNC: 140 MMOL/L (ref 136–145)
WBC # BLD AUTO: 3.45 K/UL (ref 3.9–12.7)

## 2024-10-01 PROCEDURE — 83970 ASSAY OF PARATHORMONE: CPT | Mod: HCNC | Performed by: INTERNAL MEDICINE

## 2024-10-01 PROCEDURE — 85025 COMPLETE CBC W/AUTO DIFF WBC: CPT | Mod: HCNC | Performed by: INTERNAL MEDICINE

## 2024-10-01 PROCEDURE — 80069 RENAL FUNCTION PANEL: CPT | Mod: HCNC | Performed by: INTERNAL MEDICINE

## 2024-10-01 PROCEDURE — 36415 COLL VENOUS BLD VENIPUNCTURE: CPT | Mod: HCNC | Performed by: INTERNAL MEDICINE

## 2024-10-02 NOTE — PROGRESS NOTES
Patient ID: Nadia Damon is a 69 y.o. female.    Chief Complaint:     HPI: Patient presents for breast cancer survivorship and surveillance    DIAGNOSIS: L breast microinvasive carcinoma, pTmi(m) N1mi(sn) cM0, ER/ID negative, high grade     TREATMENT HISTORY:   1. L lumpectomy 9/10/21  2. L sentinel node biopsy 10/12/21  3. Attempted chemotherapy  4. 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 She was treated with 1 cycle of systemic chemotherapy cytoxan and taxotere and udenyca that was discontinued due to toxicity. She had radiation, completed 4/14/22.   Pt heart transplant patient 31 years.     Pt has CKD and is on epo injections and hematology monitors labs- missed appt due to having CVA in July-     Pt had mckenzie diagnostic mammo and bilateral ultrasounds of breasts that revealed a complex cyst at 9 oclock- 6 mon f/u was recommended- pt was anxious - ultrasound core biopsy performed 10/31/2022- benign results    Risk factors identified:     Menarche at 14 y/o  G 2 P 2  First pregnancy at 16  LMP: partial hyst - 1984  Estrogen:none  Radiation to the neck or chest wall- none  Prior breast biopsies or atypical hyperplasia- right breast excisional biopsy- benign- left core biopsy benign in past       FH: mother breast cancer at 40's.     Body mass index is 26.77 kg/m².    Interval History:     Persistent symptoms/side effects of treatment:    Functional changes: ROM, neuropathy, weakness or fatigue- fatigue due to anemia- ROM left arm has improved with PT- right arm weakness improved recently after CVA    Psychosocial challenges- no depression     Lymphedema- completed PT in April 2023- going back due to cording- ROM has improved significantly    Diet/Nutrition- stable    Sexual Dysfunction or challenges- denies    Review of Systems   Constitutional: Negative.    HENT: Negative.     Eyes:  Negative.    Respiratory: Negative.     Cardiovascular: Negative.    Gastrointestinal: Negative.    Endocrine: Negative.    Genitourinary: Negative.    Musculoskeletal:  Positive for back pain (chronic=improving) and gait problem (right sided weakness after cva-using cane).        Shoulder pain improved- had CVA July with right sided weakness that has improved-    Skin: Negative.    Allergic/Immunologic: Negative.    Hematological: Negative.  Negative for adenopathy.   Psychiatric/Behavioral: Negative.       Breast: Pt denies any breast pain, has left lateral chest wall tenderness after last biopsy, nipple discharge, or palpable mass. No prior trauma or bruising.     Current Outpatient Medications   Medication Sig Dispense Refill    aspirin (ECOTRIN) 81 MG EC tablet Take 1 tablet (81 mg total) by mouth once daily.      calcitRIOL (ROCALTROL) 0.25 MCG Cap Take 1 capsule (0.25 mcg total) by mouth once daily. 30 capsule 11    carvediloL (COREG) 3.125 MG tablet Take 1 tablet (3.125 mg total) by mouth 2 (two) times daily with meals. Hold parameters: SBP less than 110 and/or DBP less than 75 180 tablet 3    cycloSPORINE modified, NEORAL, (NEORAL) 25 MG capsule Take 3 capsules (75 mg total) by mouth 2 (two) times daily. 540 capsule 1    furosemide (LASIX) 20 MG tablet Take 2 tablets (40 mg total) by mouth once daily. HOLD UNTIL FOLLOW UP WITH PCP 60 tablet 11    hydrALAZINE (APRESOLINE) 25 MG tablet Take 25 mg by mouth every 8 (eight) hours. HOLD hydralazine for now; may restart if BP is elevated (Patient not taking: Reported on 10/17/2024)      ondansetron (ZOFRAN) 4 MG tablet Take 4 mg by mouth as needed for Nausea.      pantoprazole (PROTONIX) 20 MG tablet TAKE 1 TABLET ONE TIME DAILY 90 tablet 3    polyethylene glycol (GLYCOLAX) 17 gram PwPk Take 17 g by mouth once daily.      pregabalin (LYRICA) 25 MG capsule Take 1 capsule (25 mg total) by mouth every evening. 30 capsule 2    sodium bicarbonate 650 MG tablet Take 1  tablet (650 mg total) by mouth 2 (two) times daily. 60 tablet 1    temazepam (RESTORIL) 30 mg capsule Take 1 capsule (30 mg total) by mouth nightly as needed for Insomnia. FOR INSOMNIA 90 capsule 1    tiZANidine 4 mg Cap Take 2 mg by mouth nightly as needed (muscle spasm).      vitamin E 400 UNIT capsule Take 400 Units by mouth once daily.      zolpidem (AMBIEN) 5 MG Tab Take 1 tablet (5 mg total) by mouth every evening. 90 tablet 1     No current facility-administered medications for this visit.       Review of patient's allergies indicates:   Allergen Reactions    Lisinopril Swelling and Rash    Hydrocodone-acetaminophen Nausea Only    Augmentin [amoxicillin-pot clavulanate] Diarrhea    Zyvox [linezolid] Nausea And Vomiting       Past Medical History:   Diagnosis Date    Abdominal wall hernia     CT Renal 6/11/2018---Small fat containing superior ventral abdominal wall hernia at the epicardial pacing lead site.    Abnormal mammogram 10/12/2021    Acute cystitis without hematuria 05/10/2022    Acute ischemic right middle cerebral artery (MCA) stroke 07/20/2024    GRACE (acute kidney injury) 11/22/2021    Anxiety     Aphasia 07/19/2024    Arthritis     ZEN HIPS    Breast cancer in female 08/2021    LEFT BREAST    Bronchitis 08/18/2016    Never smoked      C. difficile colitis 11/29/2021    Cellulitis of axilla, left 12/23/2021    Chronic diastolic heart failure 12/16/2021    Chronic kidney disease     stage 4, GFR 15-29 ml/min    Chronic midline low back pain without sciatica 06/18/2018    Closed nondisplaced fracture of distal phalanx of left great toe with routine healing 10/22/2018    Coronary artery disease 1993    heart transplant    COVID-19 in immunocompromised patient 02/26/2024    Cystitis 05/10/2022    Depression     Encounter for blood transfusion     Encounter for palliative care 10/14/2024    Fibromyalgia     on Lyrica    Heart failure     native heart cardiomyopathy    Heart transplanted 1993    due to  cardiomyopathy    History of hyperparathyroidism; Hyperparathyroidism, secondary renal     PT DENIES    Hypertension     Immune disorder     anti rejection meds    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    Impaired mobility 07/28/2022    Iron deficiency anemia 08/15/2017    Kidney stones     passed per pt    Obesity     Obesity (BMI 30.0-34.9) 07/22/2019    Other osteoporosis without current pathological fracture 08/30/2019    Other pancytopenia 05/06/2024    Parotitis, acute 09/19/2023    Pharyngitis 01/09/2019    Pneumonia due to infectious organism 03/14/2024    PONV (postoperative nausea and vomiting)     Severe sepsis 11/22/2021    Shingles 2003 approx    left leg    Subclinical hypothyroidism 06/16/2023    Thrombocytopenia, unspecified 11/29/2021    Trouble in sleeping     Urinary incontinence        Past Surgical History:   Procedure Laterality Date    bladder implant Right 06/11/2024    BLADDER SURGERY  2015 approx    mesh - Dr Everett then 2nd reconstructive sx Dr Onofre    BREAST BIOPSY Bilateral     NEGATIVE    BREAST BIOPSY Right 10/31/2022    benign    BREAST LUMPECTOMY Left 2021    BREAST SURGERY Left 09/28/2015    Bx - benign    BREAST SURGERY Right 12/2015    Bx benign    CARDIAC PACEMAKER REMOVAL Left 06/26/2014    Pacer defirillator removed. Put in 1993 aat time of heart transplant    CARPAL TUNNEL RELEASE Left 03/03/2015    Dr. Hall    COLONOSCOPY N/A 02/25/2021    Procedure: COLONOSCOPY;  Surgeon: Freida Ramirez MD;  Location: [facility];  Service: Endoscopy;  Laterality: N/A;    CYSTOCELE REPAIR      Twice with mesh removal    EPIDURAL STEROID INJECTION INTO CERVICAL SPINE N/A 02/02/2023    Procedure: T11/T12 IL HELLEN;  Surgeon: Jassi Pierre MD;  [facility];  Service: Pain Management;  Laterality: N/A;    EPIDURAL STEROID INJECTION INTO CERVICAL SPINE N/A 9/16/2024    Procedure: T11/T12 IL HELLEN;  Surgeon: Jassi Pierre MD;  [facility];   Service: Pain Management;  Laterality: N/A;    HEART TRANSPLANT  1993    HERNIA REPAIR Right 1971 approx    Inguinal    HYSTERECTOMY  1983    vag hyst /LSO     IMPLANTATION OF PERMANENT SACRAL NERVE STIMULATOR N/A 06/11/2024    Procedure: INSERTION, NEUROSTIMULATOR, PERMANENT, SACRAL;  Surgeon: Sami Cochran MD;  Location: [facility];  Service: Urology;  Laterality: N/A;    INCISION AND DRAINAGE OF ABSCESS Left 12/24/2021    Procedure: INCISION AND DRAINAGE, ABSCESS;  Surgeon: Joseph Longo MD;  Location: [facility] OR;  Service: General;  Laterality: Left;    INJECTION OF ANESTHETIC AGENT AROUND MEDIAL BRANCH NERVES INNERVATING LUMBAR FACET JOINT Right 10/19/2022    Procedure: Right L4/L5 and L5/S1 MBB;  Surgeon: Jassi Pierre MD;  Location: [facility]IN [facility]GT;  Service: Pain Management;  Laterality: Right;    INJECTION OF ANESTHETIC AGENT AROUND MEDIAL BRANCH NERVES INNERVATING LUMBAR FACET JOINT Right 11/09/2022    Procedure: Right L4/L5 and L5/S1 MBB;  Surgeon: Jassi Pierre MD;  Location: [facility];  Service: Pain Management;  Laterality: Right;    INJECTION OF ANESTHETIC AGENT INTO SACROILIAC JOINT Right 08/22/2022    Procedure: Right SIJ Injection Right L5/S1 Facte Injection;  Surgeon: Jassi Pierre MD;  Location: [facility];  Service: Pain Management;  Laterality: Right;    INSERTION OF TUNNELED CENTRAL VENOUS CATHETER (CVC) WITH SUBCUTANEOUS PORT N/A 11/09/2021    Procedure: [id number];  Surgeon: Christoph Douglas MD;  Location: [facility] OR;  Service: General;  Laterality: N/A;    RADIOFREQUENCY THERMOCOAGULATION Right 12/07/2022    Procedure: Right L4/L5 and L5/S1 Lumbar RFA;  Surgeon: Jassi Pierre MD;  Location: [facility];  Service: Pain Management;  Laterality: Right;    REMOVAL OF VASCULAR ACCESS PORT      ROBOT-ASSISTED LAPAROSCOPIC ABDOMINAL SACROCOLPOPEXY N/A 08/10/2023    Procedure: ROBOTIC SACROCOLPOPEXY, ABDOMEN;  Surgeon: PRANAY Villalobos MD;  Location: [facility];   Service: OB/GYN;  Laterality: N/A;    ROBOT-ASSISTED LAPAROSCOPIC OOPHORECTOMY Right 08/10/2023    Procedure: ROBOTIC OOPHORECTOMY;  Surgeon: PRANAY Villalobos MD;  Location: [facility] OR;  Service: OB/GYN;  Laterality: Right;    SENTINEL LYMPH NODE BIOPSY Left 10/12/2021    Procedure: BIOPSY, LYMPH NODE, SENTINEL;  Surgeon: Christoph Douglas MD;  Location: [facility] OR;  Service: General;  Laterality: Left;    TOE SURGERY      XI ROBOTIC URETHROPEXY N/A 08/10/2023    Procedure: XI ROBOTIC URETHROPEXY;  Surgeon: PRANAY Villalobos MD;  Location: [facility] OR;  Service: OB/GYN;  Laterality: N/A;       Family History   Problem Relation Name Age of Onset    Cancer Mother  38        breast    Breast cancer Mother      Breast cancer Maternal Grandmother      Heart disease Maternal Grandmother      Hypertension Son      Cataracts Cousin      Diabetes Neg Hx      Stroke Neg Hx      Kidney disease Neg Hx      Asthma Neg Hx      COPD Neg Hx      Melanoma Neg Hx      Hyperlipidemia Neg Hx         Social History     Socioeconomic History    Marital status: Single    Number of children: 2    Highest education level: 11th grade   Occupational History    Occupation: Retired   Tobacco Use    Smoking status: Never     Passive exposure: Never    Smokeless tobacco: Never   Substance and Sexual Activity    Alcohol use: Never     Alcohol/week: 0.0 standard drinks of alcohol    Drug use: No    Sexual activity: Not Currently     Partners: Male     Birth control/protection: See Surgical Hx   Other Topics Concern    Are you pregnant or think you may be? No    Breast-feeding No   Social History Narrative    Single. 2 children , 1  at 30 yoa   strep throat -  pneumonia and renal complications after not completing course of AB. Other child lives in [place], [place]. Has a cousin locally that could help in an emergency. 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 Caffeine intake =- 1 cola a day. No coffee, + occasional tea,  avoids caffeine especially at night. Still drives. She does not have a Living Will or Advanced directive.      Social Drivers of Health     Financial Resource Strain: Low Risk  (8/27/2024)    Overall Financial Resource Strain (CARDIA)     Difficulty of Paying Living Expenses: Not hard at all   Food Insecurity: No Food Insecurity (8/27/2024)    Hunger Vital Sign     Worried About Running Out of Food in the Last Year: Never true     Ran Out of Food in the Last Year: Never true   Transportation Needs: No Transportation Needs (8/27/2024)    TRANSPORTATION NEEDS     Transportation : No   Physical Activity: Inactive (8/27/2024)    Exercise Vital Sign     Days of Exercise per Week: 0 days     Minutes of Exercise per Session: 0 min   Stress: No Stress Concern Present (8/27/2024)    [employer] - Occupational Stress Questionnaire     Feeling of Stress : Not at all   Housing Stability: Low Risk  (8/27/2024)    Housing Stability Vital Sign     Unable to Pay for Housing in the Last Year: No     Homeless in the Last Year: No       Physical Exam  Constitutional:       Appearance: She is well-developed.   HENT:      Head: Normocephalic and atraumatic.      Right Ear: External ear normal.      Left Ear: External ear normal.      Mouth/Throat:      Pharynx: No oropharyngeal exudate.   Eyes:      General: No scleral icterus.        Right eye: No discharge.         Left eye: No discharge.      Conjunctiva/sclera: Conjunctivae normal.      Pupils: Pupils are equal, round, and reactive to light.   Neck:      Thyroid: No thyromegaly.   Chest:   Breasts:     Right: No inverted nipple, mass, nipple discharge, skin change or tenderness.      Left: No inverted nipple, mass, nipple discharge, skin change or tenderness.   Musculoskeletal:         General: Tenderness (left axilla and anterior shoulder tightness- no lymphedema) present.      Cervical back: Normal range of motion and neck supple.   Lymphadenopathy:       Head:      Right side of head: No submental, submandibular, tonsillar, preauricular, posterior auricular or occipital adenopathy.      Left side of head: No submental, submandibular, tonsillar, preauricular, posterior auricular or occipital adenopathy.      Cervical: No cervical adenopathy.      Right cervical: No superficial or posterior cervical adenopathy.     Left cervical: No superficial or posterior cervical adenopathy.      Upper Body:      Right upper body: No supraclavicular adenopathy.      Left upper body: No supraclavicular adenopathy.   Skin:     General: Skin is warm and dry.      Coloration: Skin is not pale.      Findings: No erythema or rash.   Neurological:      Mental Status: She is alert and oriented to person, place, and time.   Psychiatric:         Behavior: Behavior normal.         Thought Content: Thought content normal.         Judgment: Judgment normal.     9/19/2022  Result:   Mammo Digital Diagnostic Bilat with Iván  US Breast Bilateral Limited     History:  Patient is 67 y.o. and is seen for diagnostic imaging.     Films Compared:  Prior images (if available) were compared.     Findings:  This procedure was performed using tomosynthesis. Computer-aided detection was utilized in the interpretation of this examination.  The breasts have scattered areas of fibroglandular density.         Left breast demonstrates postoperative lumpectomy findings of the upper outer breast with post radiation skin thickening.  No detrimental mammographic change appreciated.  Left axillary region postsurgical findings present without concerning mammographic mass.            Ultrasound was performed at the area of pain within the left axilla without corresponding sonographic abnormality.  Follow-up imaging at the 3 o'clock position demonstrates resolution of previously noted fluid with ill-defined hypoechoic area felt to represent postsurgical scarring.           Right breast demonstrates multiple lateral  oval masses with the more anterior mass larger when compared to 07/13/2021.  Ultrasound demonstrates multiple complex cysts at 09:00 o'clock corresponding to mammographic lesions.  Largest cyst measures 9 mm and corresponds to the more anterior mammographic lesion.  Six-month follow-up can be obtained.            Impression:  Right breast 09:00 o'clock complex cyst. Six-month follow-up recommended.   No axillary abnormality at the area pain with postsurgical and radiation changes of the left breast. Continue follow-up recommended.         BI-RADS Category:   Overall: 3 - Probably Benign     Recommendation:  Short interval follow-up is recommended in 6 Months.      10/31/2022  Final Pathologic Diagnosis 1. Right breast (3 o'clock position), biopsy:       -  Benign breast tissue with fibrosis and marked duct ectasia with   microcyst formation and papillary         apocrine metaplasia       -  Negative for atypia or malignancy        Bilateral diagnostic mammo 4/10/2023- wnl    4/18/24  Result:   Mammo Digital Diagnostic Bilat with Iván     History:  Patient is 68 y.o. and is seen for diagnostic imaging.     Films Compared:  Compared to: 04/10/2023 Mammo Digital Diagnostic Bilat with Iván, 11/28/2022 Mammo Digital Diagnostic Left with Iván, 11/28/2022 US Breast Left Limited, 05/16/2022 Mammo Digital Diagnostic Left with Iván, and 05/16/2022 US Breast Left Limited     Findings:  This procedure was performed using tomosynthesis. Computer-aided detection was utilized in the interpretation of this examination.  The breasts have scattered areas of fibroglandular density.      Left  There is a focal asymmetry seen in the upper outer quadrant of the left breast in the middle depth. Compared to the previous study, there are no significant changes. There are also associated punctate calcifications.   There are post-surgical findings from a previous lumpectomy with radiation seen in the upper outer quadrant of the left breast.  There has been no interval development of a suspicious mass, microcalcification, or architectural distortion.      Right  There are post-surgical findings from a previous percutaneous biopsy seen in the retroareolar region of the right breast at 10 o'clock in the anterior depth. Compared to the previous study, there are no significant changes. There has been no interval development of a suspicious mass, microcalcification, or architectural distortion.      Impression:  Bilateral  There is no mammographic evidence of malignancy.     BI-RADS Category:   Overall: 2 - Benign        Recommendation:  Routine screening mammogram in 1 year is recommended.          Assessment & Plan:  Ductal carcinoma in situ (DCIS) of left breast    Secondary and unspecified malignant neoplasm of axilla and upper limb lymph nodes    Encounter for follow-up surveillance of breast cancer    Encounter for screening breast examination    Counseling and coordination of care    Counseling on health promotion and disease prevention      Negative clinical findings on exam.  mckenzie diagnostic mammo 4/18/24- stable findings- wnl  S/p benign core biopsy right breast 10/2022   Rtc APril for mammo- diagnostic and exam   BSE encouraged- call for any changes  Eating healthy diet and exercising encouraged  Left axilla tightness with ROM- pt had therapy with PT  4/2023- improved significantly and in PT as needed now    TIME SPENT WITH PATIENT: Time spent: 30 minutes in face to face discussion concerning diagnosis, review of test results, management of disease, medication counseling, counseling of patient and coordination of care between various health care providers . Greater than half the time spent was used for coordination of care and counseling of patient.

## 2024-10-04 ENCOUNTER — NURSE TRIAGE (OUTPATIENT)
Dept: ADMINISTRATIVE | Facility: CLINIC | Age: 69
End: 2024-10-04
Payer: MEDICARE

## 2024-10-04 ENCOUNTER — TELEPHONE (OUTPATIENT)
Dept: PRIMARY CARE CLINIC | Facility: CLINIC | Age: 69
End: 2024-10-04
Payer: MEDICARE

## 2024-10-04 ENCOUNTER — PATIENT MESSAGE (OUTPATIENT)
Dept: PRIMARY CARE CLINIC | Facility: CLINIC | Age: 69
End: 2024-10-04
Payer: MEDICARE

## 2024-10-04 DIAGNOSIS — M79.7 FIBROMYALGIA: Chronic | ICD-10-CM

## 2024-10-04 RX ORDER — PREGABALIN 50 MG/1
50 CAPSULE ORAL NIGHTLY
Qty: 30 CAPSULE | Refills: 0 | Status: SHIPPED | OUTPATIENT
Start: 2024-10-04 | End: 2024-11-03

## 2024-10-04 NOTE — TELEPHONE ENCOUNTER
Left msg offering an appt with LCSW.  Patient had originally been scheduled in July prior to her CVA.

## 2024-10-04 NOTE — PROGRESS NOTES
"Spoke w Ms. Damon by phone  She has not taken hydralazine since this morning. She has restarted furosemide as advised by Dr. Crawford.     Hypotension earlier today had improved to 109/64 after getting home, hydrating, eating some salty chips.    Reports she started to feel bloated and threw up some after drinking a lot of water and other fluids.   Reports minimal urine output despite vigorous oral rehydration.    Around 6PM: BP checked while on the phone: 125/72.    Advised per below:   Stop taking Evista (raloxifene)  HOLD hydralazine for now (may restart if BP gets high again)  Hold carvedilol if SBP < 110    For now:  Decrease pregablin to 50 mg QHS  Decrease tizanidine to 4 mg QHS "as needed"    If renal function does not improve, may need to further decrease pregablin and tizanidine doses.    If no improvement or new concerns arise over the weekend advised go to ED.    Ms. Silveira expressed understanding and was able to repeat above instructions back to me.       "

## 2024-10-04 NOTE — SUBJECTIVE & OBJECTIVE
Past Medical History:   Diagnosis Date    Abdominal wall hernia     CT Renal 6/11/2018---Small fat containing superior ventral abdominal wall hernia at the epicardial pacing lead site.    Abnormal mammogram 10/12/2021    Acute cystitis without hematuria 05/10/2022    GRACE (acute kidney injury) 11/22/2021    Anxiety     Arthritis     ZEN HIPS    Breast cancer in female 08/2021    LEFT BREAST    Bronchitis 08/18/2016    Never smoked      C. difficile colitis 11/29/2021    Cellulitis of axilla, left 12/23/2021    Chronic diastolic heart failure 12/16/2021    Chronic kidney disease     stage 4, GFR 15-29 ml/min    Chronic midline low back pain without sciatica 06/18/2018    Closed nondisplaced fracture of distal phalanx of left great toe with routine healing 10/22/2018    Coronary artery disease 1993    heart transplant    COVID-19 in immunocompromised patient 02/26/2024    Cystitis 05/10/2022    Depression     Encounter for blood transfusion     Fibromyalgia     on Lyrica    Heart failure     native heart cardiomyopathy    Heart transplanted 1993    due to cardiomyopathy    History of hyperparathyroidism; Hyperparathyroidism, secondary renal     PT DENIES    Hypertension     Immune disorder     anti rejection meds    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    Impaired mobility 07/28/2022    Iron deficiency anemia 08/15/2017    Kidney stones     passed per pt    Obesity     Obesity (BMI 30.0-34.9) 07/22/2019    Other osteoporosis without current pathological fracture 08/30/2019    Other pancytopenia 05/06/2024    Parotitis, acute 09/19/2023    Pharyngitis 01/09/2019    Pneumonia due to infectious organism 03/14/2024    PONV (postoperative nausea and vomiting)     Severe sepsis 11/22/2021    Shingles 2003 approx    left leg    Subclinical hypothyroidism 06/16/2023    Thrombocytopenia, unspecified 11/29/2021    Trouble in sleeping     Urinary incontinence        Past Surgical History:   Procedure  Laterality Date    bladder implant Right 06/11/2024    BLADDER SURGERY  2015 approx    mesh - Dr Everett then 2nd reconstructive sx Dr Onofre    BREAST BIOPSY Bilateral     NEGATIVE    BREAST BIOPSY Right 10/31/2022    benign    BREAST LUMPECTOMY Left 2021    BREAST SURGERY Left 09/28/2015    Bx - benign    BREAST SURGERY Right 12/2015    Bx benign    CARDIAC PACEMAKER REMOVAL Left 06/26/2014    Pacer defirillator removed. Put in 1993 aat time of heart transplant    CARPAL TUNNEL RELEASE Left 03/03/2015    Dr. Hall    COLONOSCOPY N/A 02/25/2021    Procedure: COLONOSCOPY;  Surgeon: Freida Ramirez MD;  Location: [facility] ENDO;  Service: Endoscopy;  Laterality: N/A;    CYSTOCELE REPAIR      Twice with mesh removal    EPIDURAL STEROID INJECTION INTO CERVICAL SPINE N/A 02/02/2023    Procedure: T11/T12 IL HELLEN;  Surgeon: Jassi Pierre MD;  Location: [facility] PAIN MGT;  Service: Pain Management;  Laterality: N/A;    HEART TRANSPLANT  1993    HERNIA REPAIR Right 1971 approx    Inguinal    HYSTERECTOMY  1983    vag hyst /LSO     IMPLANTATION OF PERMANENT SACRAL NERVE STIMULATOR N/A 06/11/2024    Procedure: INSERTION, NEUROSTIMULATOR, PERMANENT, SACRAL;  Surgeon: Sami Cochran MD;  Location: [facility] OR;  Service: Urology;  Laterality: N/A;    INCISION AND DRAINAGE OF ABSCESS Left 12/24/2021    Procedure: INCISION AND DRAINAGE, ABSCESS;  Surgeon: Joseph Longo MD;  Location: [facility] OR;  Service: General;  Laterality: Left;    INJECTION OF ANESTHETIC AGENT AROUND MEDIAL BRANCH NERVES INNERVATING LUMBAR FACET JOINT Right 10/19/2022    Procedure: Right L4/L5 and L5/S1 MBB;  Surgeon: Jassi Pierre MD;  Location: [facility] PAIN MGT;  Service: Pain Management;  Laterality: Right;    INJECTION OF ANESTHETIC AGENT AROUND MEDIAL BRANCH NERVES INNERVATING LUMBAR FACET JOINT Right 11/09/2022    Procedure: Right L4/L5 and L5/S1 MBB;  Surgeon: Jassi Pierre MD;  Location: [facility] PAIN MGT;  Service: Pain Management;  Laterality:  Right;    INJECTION OF ANESTHETIC AGENT INTO SACROILIAC JOINT Right 08/22/2022    Procedure: Right SIJ Injection Right L5/S1 Facte Injection;  Surgeon: Jassi Pierre MD;  Location: [facility] PAIN MGT;  Service: Pain Management;  Laterality: Right;    INSERTION OF TUNNELED CENTRAL VENOUS CATHETER (CVC) WITH SUBCUTANEOUS PORT N/A 11/09/2021    Procedure: [id number];  Surgeon: Christoph Douglas MD;  Location: [facility] OR;  Service: General;  Laterality: N/A;    RADIOFREQUENCY THERMOCOAGULATION Right 12/07/2022    Procedure: Right L4/L5 and L5/S1 Lumbar RFA;  Surgeon: Jassi Pierre MD;  Location: [facility] PAIN MGT;  Service: Pain Management;  Laterality: Right;    REMOVAL OF VASCULAR ACCESS PORT      ROBOT-ASSISTED LAPAROSCOPIC ABDOMINAL SACROCOLPOPEXY N/A 08/10/2023    Procedure: ROBOTIC SACROCOLPOPEXY, ABDOMEN;  Surgeon: PRANAY Villalobos MD;  Location: [facility] OR;  Service: OB/GYN;  Laterality: N/A;    ROBOT-ASSISTED LAPAROSCOPIC OOPHORECTOMY Right 08/10/2023    Procedure: ROBOTIC OOPHORECTOMY;  Surgeon: PRANAY Villalobos MD;  Location: [facility] OR;  Service: OB/GYN;  Laterality: Right;    SENTINEL LYMPH NODE BIOPSY Left 10/12/2021    Procedure: BIOPSY, LYMPH NODE, SENTINEL;  Surgeon: Christoph Douglas MD;  Location: [facility] OR;  Service: General;  Laterality: Left;    TOE SURGERY      XI ROBOTIC URETHROPEXY N/A 08/10/2023    Procedure: XI ROBOTIC URETHROPEXY;  Surgeon: PRANAY Villalobos MD;  Location: [facility] OR;  Service: OB/GYN;  Laterality: N/A;       Review of patient's allergies indicates:   Allergen Reactions    Lisinopril Swelling and Rash    Augmentin [amoxicillin-pot clavulanate] Diarrhea    Zyvox [linezolid] Nausea And Vomiting       No current facility-administered medications on file prior to encounter.     Current Outpatient Medications on File Prior to Encounter   Medication Sig    aspirin (ECOTRIN) 81 MG EC tablet Take 1 tablet (81 mg total) by mouth once daily.    carvediloL (COREG) 6.25 MG tablet Take 1  tablet (6.25 mg total) by mouth 2 (two) times daily with meals. Hold parameters: SBP less than 110 and/or DBP less than 75    cycloSPORINE (SANDIMMUNE) 100 MG Cap Take 100 mg by mouth once daily.    cycloSPORINE modified, NEORAL, (NEORAL) 25 MG capsule Take 3 capsules (75 mg total) by mouth 2 (two) times daily.    furosemide (LASIX) 20 MG tablet TAKE 1 TABLET EVERY DAY    hydrALAZINE (APRESOLINE) 25 MG tablet Take 25 mg by mouth every 8 (eight) hours.    NIFEdipine (PROCARDIA-XL) 30 MG (OSM) 24 hr tablet TAKE 1 TABLET ONE TIME DAILY (Patient taking differently: Take 30 mg by mouth once daily.)    oxyCODONE-acetaminophen (PERCOCET)  mg per tablet Take 1 tablet by mouth every 6 (six) hours as needed.    pantoprazole (PROTONIX) 20 MG tablet TAKE 1 TABLET ONE TIME DAILY    polyethylene glycol (GLYCOLAX) 17 gram PwPk Take 17 g by mouth once daily.    pregabalin (LYRICA) 50 MG capsule Take 1 capsule (50 mg total) by mouth 2 (two) times daily.    raloxifene (EVISTA) 60 mg tablet Take 60 mg by mouth once daily.    temazepam (RESTORIL) 30 mg capsule TAKE 1 CAPSULE NIGHTLY AS NEEDED FOR INSOMNIA    traMADoL (ULTRAM) 50 mg tablet Take 50 mg by mouth every 6 (six) hours as needed for Pain.    vitamin E 400 UNIT capsule Take 400 Units by mouth once daily.    [DISCONTINUED] atorvastatin (LIPITOR) 40 MG tablet Take 1 tablet (40 mg total) by mouth once daily.    [DISCONTINUED] cholecalciferol, vitamin D3, (VITAMIN D3) 125 mcg (5,000 unit) Tab Take 5,000 Units by mouth once daily.    [DISCONTINUED] EVENING PRIMROSE OIL ORAL Take 1,000 mg by mouth once daily.    [DISCONTINUED] fluticasone propionate (FLONASE) 50 mcg/actuation nasal spray 2 sprays (100 mcg total) by Each Nostril route once daily.    [DISCONTINUED] LIDOcaine (LIDODERM) 5 % PLACE 1 PATCH ONTO THE SKIN DAILY AS NEEDED (BACK PAIN). REMOVE AND DISCARD PATCH WITHIN 12 HOURS OR AS DIRECTED BY MD    [DISCONTINUED] multivitamin capsule Take 1 capsule by mouth once daily.     [DISCONTINUED] patiromer calcium sorbitex (VELTASSA) 8.4 gram PwPk Take 1 packet (8.4 g total) by mouth once daily.    [DISCONTINUED] polyethylene glycol (GLYCOLAX) 17 gram PwPk Take 17 g by mouth once daily.    [DISCONTINUED] RETACRIT 20,000 unit/mL injection     [DISCONTINUED] scopolamine (TRANSDERM-SCOP) 1.3-1.5 mg (1 mg over 3 days) Place 1 patch onto the skin Every 3 (three) days.    [DISCONTINUED] tiZANidine (ZANAFLEX) 4 MG tablet Take 1 tablet (4 mg total) by mouth every 6 (six) hours as needed (pain).    [DISCONTINUED] UNABLE TO FIND medication name: Stool softener (Ducolax) Laxative    [DISCONTINUED] vitamin E 400 UNIT capsule Take 400 Units by mouth once daily.    [DISCONTINUED] zolpidem (AMBIEN) 5 MG Tab Take 1 tablet (5 mg total) by mouth nightly as needed (insomnia).    methocarbamoL (ROBAXIN) 750 MG Tab Take 750 mg by mouth 3 (three) times daily.     Family History       Problem Relation (Age of Onset)    Breast cancer Mother, Maternal Grandmother    Cancer Mother (38)    Cataracts Cousin    Heart disease Maternal Grandmother    Hypertension Son          Tobacco Use    Smoking status: Never     Passive exposure: Never    Smokeless tobacco: Never   Substance and Sexual Activity    Alcohol use: Never     Alcohol/week: 0.0 standard drinks of alcohol    Drug use: No    Sexual activity: Not Currently     Partners: Male     Birth control/protection: See Surgical Hx     Review of Systems   Constitutional:  Positive for activity change. Negative for chills and fever.   HENT:  Negative for trouble swallowing.    Eyes:  Negative for visual disturbance.   Respiratory:  Negative for cough and shortness of breath.    Cardiovascular:  Negative for chest pain.   Gastrointestinal:  Negative for abdominal pain, constipation, diarrhea, nausea and vomiting.   Genitourinary:  Negative for difficulty urinating.   Musculoskeletal:  Positive for back pain. Negative for neck pain and neck stiffness.   Skin:  Negative for  color change.   Neurological:  Negative for dizziness, seizures, syncope, weakness, light-headedness, numbness and headaches.   Psychiatric/Behavioral:  Negative for agitation, behavioral problems and confusion.      Objective:     Vital Signs (Most Recent):  Temp: 98.7 °F (37.1 °C) (08/27/24 1042)  Pulse: 86 (08/27/24 1400)  Resp: 18 (08/27/24 1512)  BP: 139/77 (08/27/24 1400)  SpO2: 100 % (08/27/24 1400) Vital Signs (24h Range):  Temp:  [98.7 °F (37.1 °C)] 98.7 °F (37.1 °C)  Pulse:  [] 86  Resp:  [18-20] 18  SpO2:  [99 %-100 %] 100 %  BP: (137-160)/(73-88) 139/77     Weight: 66.5 kg (146 lb 9.7 oz)  Body mass index is 26.81 kg/m².     Physical Exam  Vitals reviewed.   Constitutional:       General: She is not in acute distress.     Appearance: She is ill-appearing.   HENT:      Head: Normocephalic.      Mouth/Throat:      Mouth: Mucous membranes are moist.   Eyes:      Extraocular Movements: Extraocular movements intact.   Cardiovascular:      Rate and Rhythm: Normal rate.      Pulses: Normal pulses.   Pulmonary:      Effort: Pulmonary effort is normal. No respiratory distress.      Breath sounds: Normal breath sounds.   Abdominal:      General: Bowel sounds are normal. There is no distension.      Palpations: Abdomen is soft.      Tenderness: There is no abdominal tenderness.   Genitourinary:     Comments: Deferred  Musculoskeletal:      Cervical back: Normal range of motion.      Right lower leg: No edema.      Left lower leg: No edema.   Skin:     General: Skin is warm and dry.   Neurological:      Mental Status: She is alert and oriented to person, place, and time.   Psychiatric:         Mood and Affect: Mood normal.         Behavior: Behavior normal.         Thought Content: Thought content normal.                Significant Labs: All pertinent labs within the past 24 hours have been reviewed.  CBC:   Recent Labs   Lab 08/27/24  1252   WBC 3.45*   HGB 9.0*   HCT 27.9*        CMP:   Recent Labs    Lab 08/27/24  1252      K 4.0      CO2 20*   GLU 73   BUN 60*   CREATININE 5.5*   CALCIUM 9.0   PROT 7.7   ALBUMIN 3.5   BILITOT 0.5   ALKPHOS 99   AST 32   ALT 22   ANIONGAP 13     Urine Studies:   Recent Labs   Lab 08/27/24  1253   COLORU Yellow   APPEARANCEUA Clear   PHUR 6.0   SPECGRAV 1.010   PROTEINUA 1+*   GLUCUA Negative   KETONESU Negative   BILIRUBINUA Negative   OCCULTUA Negative   NITRITE Negative   UROBILINOGEN Negative   LEUKOCYTESUR Negative   RBCUA 0   WBCUA 2   BACTERIA Rare   SQUAMEPITHEL 2   HYALINECASTS 0       Significant Imaging:   Imaging Results    None         Leg weakness

## 2024-10-04 NOTE — TELEPHONE ENCOUNTER
Heart transplant pt. Pt calling regarding low blood pressure today and lightheadedness upon standing. At 2:05pm, BP 96/50 to left arm and 91/50 to right arm.     Dispo- warm transferred pt to  who was instructed to get pt transferred to the heart transplant coordinator.   Reason for Disposition   Health information question, no triage required and triager able to answer question    Protocols used: Information Only Call - No Triage-A-[place]

## 2024-10-04 NOTE — TELEPHONE ENCOUNTER
Received call from triage nurse re: hypotension and dizziness following outpatient physical therapy today. Returned call to pt, pt reports that her BP was ranging 80/50 and she felt like she was going to pass out. Pt states the staff was very concerned and called a nurse to evaluate. Pt transported by  and picked up by transportation. On arrival home after eating BP 91/50 P 75; repeat at 1535 /61 P 83. Called facility to speak with staff; message left to return call. Updated PCP in clinic.     Outpatient Therapy Facility  [facility] - [address]  [phone]

## 2024-10-04 NOTE — TELEPHONE ENCOUNTER
Received call back from [facility], [facility]. Pt had PT/OT/ST visit, following ST pt experienced syncope, BP assessed 71/43, employee health nurse summoned for assistance. Oral fluids given with repeat BP 81/49 P 66. Pt transported to her ride via wheelchair, states employee health nurse suspected pt may be dehydrated.

## 2024-10-08 ENCOUNTER — OFFICE VISIT (OUTPATIENT)
Dept: TRANSPLANT | Facility: CLINIC | Age: 69
End: 2024-10-08
Payer: MEDICARE

## 2024-10-08 ENCOUNTER — PATIENT MESSAGE (OUTPATIENT)
Dept: CARDIOLOGY | Facility: HOSPITAL | Age: 69
End: 2024-10-08
Payer: MEDICARE

## 2024-10-08 ENCOUNTER — OFFICE VISIT (OUTPATIENT)
Dept: NEPHROLOGY | Facility: CLINIC | Age: 69
End: 2024-10-08
Payer: MEDICARE

## 2024-10-08 VITALS
HEART RATE: 100 BPM | WEIGHT: 152.56 LBS | HEIGHT: 62 IN | BODY MASS INDEX: 28.07 KG/M2 | SYSTOLIC BLOOD PRESSURE: 130 MMHG | DIASTOLIC BLOOD PRESSURE: 70 MMHG

## 2024-10-08 VITALS
BODY MASS INDEX: 28.07 KG/M2 | HEART RATE: 82 BPM | DIASTOLIC BLOOD PRESSURE: 60 MMHG | HEIGHT: 62 IN | SYSTOLIC BLOOD PRESSURE: 108 MMHG | WEIGHT: 152.56 LBS

## 2024-10-08 DIAGNOSIS — I50.33 ACUTE ON CHRONIC DIASTOLIC CONGESTIVE HEART FAILURE: Primary | ICD-10-CM

## 2024-10-08 DIAGNOSIS — Z94.1 HEART TRANSPLANTED: Primary | Chronic | ICD-10-CM

## 2024-10-08 DIAGNOSIS — N18.4 CKD (CHRONIC KIDNEY DISEASE) STAGE 4, GFR 15-29 ML/MIN: Chronic | ICD-10-CM

## 2024-10-08 PROCEDURE — 99213 OFFICE O/P EST LOW 20 MIN: CPT | Mod: HCNC,S$GLB,, | Performed by: INTERNAL MEDICINE

## 2024-10-08 PROCEDURE — 3066F NEPHROPATHY DOC TX: CPT | Mod: HCNC,CPTII,S$GLB, | Performed by: INTERNAL MEDICINE

## 2024-10-08 PROCEDURE — 3075F SYST BP GE 130 - 139MM HG: CPT | Mod: HCNC,CPTII,S$GLB, | Performed by: INTERNAL MEDICINE

## 2024-10-08 PROCEDURE — 1126F AMNT PAIN NOTED NONE PRSNT: CPT | Mod: HCNC,CPTII,S$GLB, | Performed by: INTERNAL MEDICINE

## 2024-10-08 PROCEDURE — 99215 OFFICE O/P EST HI 40 MIN: CPT | Mod: HCNC,S$GLB,, | Performed by: INTERNAL MEDICINE

## 2024-10-08 PROCEDURE — 3074F SYST BP LT 130 MM HG: CPT | Mod: HCNC,CPTII,S$GLB, | Performed by: INTERNAL MEDICINE

## 2024-10-08 PROCEDURE — 3288F FALL RISK ASSESSMENT DOCD: CPT | Mod: HCNC,CPTII,S$GLB, | Performed by: INTERNAL MEDICINE

## 2024-10-08 PROCEDURE — 3008F BODY MASS INDEX DOCD: CPT | Mod: HCNC,CPTII,S$GLB, | Performed by: INTERNAL MEDICINE

## 2024-10-08 PROCEDURE — 3044F HG A1C LEVEL LT 7.0%: CPT | Mod: HCNC,CPTII,S$GLB, | Performed by: INTERNAL MEDICINE

## 2024-10-08 PROCEDURE — 1157F ADVNC CARE PLAN IN RCRD: CPT | Mod: HCNC,CPTII,S$GLB, | Performed by: INTERNAL MEDICINE

## 2024-10-08 PROCEDURE — 3078F DIAST BP <80 MM HG: CPT | Mod: HCNC,CPTII,S$GLB, | Performed by: INTERNAL MEDICINE

## 2024-10-08 PROCEDURE — 99999 PR PBB SHADOW E&M-EST. PATIENT-LVL III: CPT | Mod: PBBFAC,HCNC,, | Performed by: INTERNAL MEDICINE

## 2024-10-08 PROCEDURE — 1101F PT FALLS ASSESS-DOCD LE1/YR: CPT | Mod: HCNC,CPTII,S$GLB, | Performed by: INTERNAL MEDICINE

## 2024-10-08 RX ORDER — ONDANSETRON 4 MG/1
4 TABLET, FILM COATED ORAL
COMMUNITY

## 2024-10-08 RX ORDER — CARVEDILOL 3.12 MG/1
3.12 TABLET ORAL 2 TIMES DAILY WITH MEALS
Qty: 180 TABLET | Refills: 3 | Status: SHIPPED | OUTPATIENT
Start: 2024-10-08 | End: 2025-10-08

## 2024-10-08 RX ORDER — CALCITRIOL 0.25 UG/1
0.25 CAPSULE ORAL DAILY
Qty: 30 CAPSULE | Refills: 11 | Status: SHIPPED | OUTPATIENT
Start: 2024-10-08

## 2024-10-08 RX ORDER — FUROSEMIDE 20 MG/1
40 TABLET ORAL DAILY
Qty: 60 TABLET | Refills: 11
Start: 2024-10-08 | End: 2025-04-06

## 2024-10-08 NOTE — PROGRESS NOTES
NEPHROLOGY CLINIC FOLLOWUP NOTE  Date of clinic visit: 10/8/24     REASON FOR FOLLOWUP AND CHIEF COMPLAINT: nephrotic syndrome, GRACE, CKD, post heart transplant, HTN, nephrolithiasis, hypercalcemia     HISTORY OF PRESENT ILLNESS: Ms. Damon is a 69-year-old female with a history of CKD stage 4 (secondary to kidney biopsy (11/10/20) proven calcineurin inhibitor nephrotoxicity (due to cyclosporine) and hypertensive nephrosclerosis), nephrolithiasis (likely mixed stones), and heart transplant in 1993 (is on cyclosporine), who presents for close f/u. Pt was last seen by me 1 month ago. Pt had worsened fluid gain after diuretics were held during a recent hospital stay for a stroke in July 2024 and has not done well since then. Pt was hospitalized in Aug 2024 for acute on chronic back pain. S Cr had worsened and lasix was unfortunately held on d/c. Pt was previously on veltassa, K was borderline low, and veltassa was also held. Pt has chronic hyperkalemia likely as a result of CSA side effect. Losartan was also held.      On f/u last month, lasix and veltassa were re-started.     On this visit, pt's main c/o is the worsened leg swelling. Pt reports being chronically ill, no SOB. Labs and meds reviewed. Cr has not improved significantly. Pt denies taking any NSAIds, no abx, no recent infections. No other pertinent issues to explain worsened s Cr. BP has been low and PCP held hydralazine and coreg. Pt does admit to large water intake.     To review: Past mild, persistent hypercalcemia reviewed. Previously suspected related to primary hyperparathyroidism (HPT) is suspected. Sestamibi nuclear scan of the neck did not show any adenomas. Her risk factors for kidney stones include hypercalcemia and being on cyclosporine for prevention of heart transplant rejection, which causes hyperuricemia, and prior intake of vitamin C.. Pt has a h/o of osteopenia.      To review, pt also has breast cancer (she did have persistent mild  hypercalcemia in the past). Hem/onc notes were reviewed. 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 Pt has had further complication, requiring hospital admission including pancytopenia, neutropenic fever, axillary cellulitis, and C diff colitis.             PAST MEDICAL HISTORY:  1. CKD stage IV. Nephrotic syndrome. Due to chronic calcineurin inhibitor toxicity due to taking cyclosporine, kidney biopsy was done on 11/10/21  2. Hypertension.  3. Heart transplant for cardiomyopathy in 1993, the patient has been on chronic  cyclosporine treatment.  4. Carpal tunnel syndrome.  5. Fibromyalgia.  6. GERD.  7. Bell's palsy.  8. Depression.     PAST SURGICAL HISTORY: Reviewed as above, unchanged.     MEDICATIONS: Reviewed     Current Outpatient Medications:     aspirin (ECOTRIN) 81 MG EC tablet, Take 1 tablet (81 mg total) by mouth once daily., Disp: , Rfl:     cycloSPORINE modified, NEORAL, (NEORAL) 25 MG capsule, Take 3 capsules (75 mg total) by mouth 2 (two) times daily., Disp: 540 capsule, Rfl: 1    NIFEdipine (PROCARDIA-XL) 30 MG (OSM) 24 hr tablet, Take 1 tablet (30 mg total) by mouth once daily., Disp: , Rfl:     pantoprazole (PROTONIX) 20 MG tablet, TAKE 1 TABLET ONE TIME DAILY, Disp: 90 tablet, Rfl: 3    polyethylene glycol (GLYCOLAX) 17 gram PwPk, Take 17 g by mouth once daily., Disp: , Rfl:     sodium bicarbonate 650 MG tablet, Take 1 tablet (650 mg total) by mouth 2 (two) times daily., Disp: 60 tablet, Rfl: 1    temazepam (RESTORIL) 30 mg capsule, Take 1 capsule (30 mg total) by mouth nightly as needed for Insomnia. FOR INSOMNIA, Disp: 90 capsule, Rfl: 1    tiZANidine 4 mg Cap, Take 2 mg by mouth nightly as needed (muscle spasm)., Disp: , Rfl:     vitamin E 400 UNIT capsule, Take 400 Units by mouth once daily., Disp: , Rfl:     zolpidem (AMBIEN) 5 MG Tab, Take 1 tablet (5 mg total) by mouth every evening., Disp: 90  tablet, Rfl: 1    calcitRIOL (ROCALTROL) 0.25 MCG Cap, Take 1 capsule (0.25 mcg total) by mouth once daily., Disp: 30 capsule, Rfl: 11    furosemide (LASIX) 20 MG tablet, Take 2 tablets (40 mg total) by mouth once daily. HOLD UNTIL FOLLOW UP WITH PCP, Disp: 60 tablet, Rfl: 11    ondansetron (ZOFRAN) 4 MG tablet, Take 4 mg by mouth as needed for Nausea., Disp: , Rfl:     patiromer calcium sorbitex (VELTASSA) 8.4 gram PwPk, Take 1 packet (8.4 g total) by mouth once. for 1 dose, Disp: 30 packet, Rfl: 6    pregabalin (LYRICA) 25 MG capsule, Take 1 capsule (25 mg total) by mouth every evening., Disp: 30 capsule, Rfl: 2        SOCIAL HISTORY: Reviewed. Negative for smoking. No alcohol use.      REVIEW OF SYSTEMS: No recent hospitalizations.  GENERAL: Negative.  HEAD, EYES, EARS, NOSE, AND THROAT: Negative.  CARDIAC: Negative.  PULMONARY: Negative.  GASTROINTESTINAL: Negative.  GENITOURINARY: Negative.  PSYCHOLOGICAL: Negative.  NEUROLOGICAL: Negative.  ENDOCRINE: Negative.  HEMATOLOGIC AND ONCOLOGIC: Negative.  INFECTIOUS DISEASE: Negative.  The rest of the review of systems negative.     PHYSICAL EXAMINATION:  VITAL SIGNS: /60, Pulse is 82, weight is 69.2 Kg, from 70.9 Kg, from 70.8 Kg, from 72.2 Kg,   GENERAL: She is cooperative, pleasant, in no acute distress.   Speech and thought process appropriate, normal.  HEENT: Mucous membranes moist.  NECK: Neck JVD. Normal hearing.  ABDOMEN: Soft, nontender.  EXTREMITIES: 3+ pitting edema (has 1+ last visit)     LABS: Reviewed.   BMP  Lab Results   Component Value Date     10/01/2024    K 5.7 (H) 10/01/2024     10/01/2024    CO2 22 (L) 10/01/2024    BUN 54 (H) 10/01/2024    CREATININE 4.0 (H) 10/01/2024    CALCIUM 8.9 10/01/2024    ANIONGAP 11 10/01/2024    EGFRNORACEVR 11.5 (A) 10/01/2024     Lab Results   Component Value Date    WBC 3.45 (L) 10/01/2024    HGB 8.1 (L) 10/01/2024    HCT 25.9 (L) 10/01/2024    MCV 92 10/01/2024     10/01/2024       Lab  Results   Component Value Date    .3 (H) 10/01/2024    CALCIUM 8.9 10/01/2024    CAION 1.13 09/05/2018    PHOS 4.3 10/01/2024          Urine protein/cr 0.54 g, from 0.32 g, from 0.49 g, from 2.2 g, 6.0 g  U/a: no protein (from 3+), no blood, 4 RBC's, no casts     Complements C3 and C4 both normal     To review older labs:   24 hour urine: Ca not measured, uric acid 138, Oxalate 20, citrate 203  U/a unremarkable  Urine Cx: neg   Urine P/Cr 740 mg     KUB: unremarkable, except for some constipation     Renal u/s: FINDINGS: 10/22/20  Kidneys measure 10.3 and 9.7 cm in length on the right left respectively.  Cortex is smoothly marginated well maintained with mild diffuse increased echotexture.  Appearance suggest medical renal disease.  Two simple cyst identified within the right kidney.  Upper pole cyst 1.4 cm maximum diameter and inferior pole cyst 2.2 cm maximum diameter.  There is 9 mm simple cyst within the left inferior pole.  Resistive indices are 0.66 and 0.76 on the right left respectively.  Urinary bladder partially distended without focal or diffuse wall thickening.     CT abd: The left kidney appears slightly small.  Right kidney is grossly normal in size.  No obvious hydronephrosis or nephrolithiasis     Sestamibi nuclear scan of the neck: 8/29/19: no adenomas        Kidney biopsy from 11/10/21:                   ASSESSMENT AND PLAN: This is a 69-year-old female who presents for CKD follow up. Pt has had further medical problems since her last renal visit:  The impression is as follows:     1. Renal: s Cr stable, slightly lower but overall worse.   Pt likely is approaching need for needing dialysis, slowly progressive CKD. 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 Will increase veltassa from qod to qd. Hyperkalemia likely due to CSA + CKD  Na normal  Acid base stable  Ca normal  PO4 normal  Secondary hyperparathyroidism: moderate. Holding treatment with calcitriol, will re-start  Anemia:mild, multifactorial, seeing hem/onc  Fluid overload: due to large water in take + CKD. Advised pt to lower water intake < 1L/day, will increase lasix to 40 mg po qd  Advised pt that she will likely need dialysis in future.     Kidney biopsy showed:  Damage by HTN (hypertensive nephrosclerosis) as well as calcineurin inhibitor toxicity due to taking cyclosporine to prevent heart transplant rejection. There was 50% chronic interstitial fibrosis.  Pt has been advised of the kidney biopsy in layman's language  She was specifically advised NOT to stop cyclosporine of change the dose on her own.     2. HTN: BP has been low  Agree with holding hydralazine, though will re-start coreg at 3.125 mg po bid  Will also d/c nifedipine (can cause leg swelling)     3. Stroke: occurred in July 2024.  Has right sided weakness     4. Cardiac: h/o fo CHF: fluid gain has returned. Will re-start lasix.  H/o of heart transplant 1993  On CSA     5. Nephrolithiasis: no new stones. To review:  Has multiple risk factors for kidney stones: (hyperuricemia from cyclosporine, diet rich in uric acid, previously taking Vit C, vit D, and calcium,, CKD, and having primary hyperparathyroidism)  Stones are likely mixed ca oxalate and uric acid kidney stones  No longer taking Vit C  No longer takes Vit D and Ca.   Mild hyperuricemia, from cyclosporine.  F/u CT did not show any stones  U/s showed non-obstructive 2 R stones, relatively large  24 hour urine for kidney stone stratification shows hypocitraturia  Pt was advised NOT TO STOP cyclosporine.       6. H/o of breast cancer: since her last  visit with us (she did have persistent mild hypercalcemia in the past). Hem/onc notes were reviewed. 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 Pt has had further complication, requiring hospital admission including pancytopenia, neutropenic fever, axillary cellulitis, and C diff colitis.  Will defer to hem/onc     7. Anemia: normocytic. Likely multifactorial: due to CKD and cancer.  Advised pt to have PRBC transfusion. Last transfusion was in Nov 2021. Pt declined  Has f/u with hem/onc on 1/20/21. Please evaluate for epogen and IV iron therapy.              PLANS AND RECOMMENDATIONS:   As discussed above  Opportunity for questions provided  Complicated case, multiple issues addressed  RTC 2 months. OK to overbook  Total time spent 40 minutes. Extensive time spent including the time to review the records, the patient evaluation, documentation, face-to-face  discussion with the patient. More than 50% of the time was spent in counseling  and coordination of care. Level V visit.     Carter Crawford MD

## 2024-10-08 NOTE — PROGRESS NOTES
Subjective:   Ms. Damon is a 69 y.o. year old Black or  female who received a  heart transplant on 5/27/93.          HPI  Ms. Damon is a very pleasant 67 y/o AAF s/p OHTx 5/27/93 at [facility], s/p PPM same date, mild anemia and leukopenia, OA, cervical spine DJD with radiculopathy and chronic neck pain who presents for her 31st post annual transplant evaluation. Four years ago after her annual was found to have BRCA, underwent excision, chemo/radiation, had a rough time of it, with some complications related to it, however recovered well.     Today is recovering form a bladder stimulator, had the bladder pulled up, surgery went well but recovering from it still. Had a neurostimulator sacral placed in 06/11/24. Did have a fall in between went to ED, got pain med, injection.  Last visit was having significant pain using a cane, today she seems to be doing a little bit better than expected.  That being said, still has severe difficulty ambulating.  Stable from cardiopulmonary standpoint however is not significantly active.  She is here today for her routine clinic visit    Her    TTE 07/20/2024:    Left Ventricle: The left ventricle is normal in size. Normal wall thickness. There is normal systolic function with a visually estimated ejection fraction of 55 - 60%. There is normal diastolic function.    Right Ventricle: Normal right ventricular cavity size. Wall thickness is normal. Systolic function is normal.    Left Atrium: Patent foramen ovale visualized with predominant right to left shunting indicated by saline contrast.    Tricuspid Valve: There is mild regurgitation.    IVC/SVC: Normal venous pressure at 3 mmHg.    The patient is status post cardiac transplantation.  PFO not present with bubble alone, but with valsalva had very small bubbles come across    TTE 05/28/24:    Left Ventricle: The left ventricle is normal in size. Normal wall thickness. There is mild asymmetric hypertrophy.  Normal wall motion. Septal flattening in systole consistent with right ventricular pressure overload. There is normal systolic function with a visually estimated ejection fraction of 55 - 60%. There is diastolic dysfunction but grade cannot be determined.    Right Ventricle: Moderate right ventricular enlargement. Wall thickness is normal. Systolic function is normal.    Left Atrium: Left atrium is severely dilated.    Right Atrium: Right atrium is moderately dilated.    Tricuspid Valve: There is moderate regurgitation.    IVC/SVC: Intermediate venous pressure at 8 mmHg.  Echo today:  The left ventricle is normal in size with concentric hypertrophy and normal systolic function.  Indeterminate left ventricular diastolic function.  The estimated PA systolic pressure is 37 mmHg.  Normal right ventricular size with normal right ventricular systolic function.  Normal central venous pressure (3 mmHg).  The estimated ejection fraction is 60%.  Moderate tricuspid regurgitation.  Mild pulmonic regurgitation.        Cxr: Comparison is made to January 4, 2023.  Electrical lead overlies the lower chest and upper abdomen.  Surgical clips overlie the upper abdomen.  Mediastinal silhouette is prominent.  The lungs are clear.  No pneumothorax or significant pleural effusion    DSE 05/24/21:  The left ventricle is normal in size with concentric remodeling and normal systolic function.  The patient reached the end of the protocol.  There were no arrhythmias during stress.  Severe left atrial enlargement.  The estimated ejection fraction is 55%.  Grade II left ventricular diastolic dysfunction.  Normal right ventricular size with normal right ventricular systolic function.  Mild-to-moderate mitral regurgitation.  Mild to moderate tricuspid regurgitation.  Normal central venous pressure (3 mmHg).  The estimated PA systolic pressure is 39 mmHg.  The stress echo portion of this study is negative for myocardial ischemia.  Severe left  "atrial enlargement.  The ECG portion of this study is negative for myocardial ischemia.       Review of Systems   Constitutional: Negative. Negative for chills, decreased appetite, diaphoresis, fever, malaise/fatigue, night sweats, weight gain and weight loss.   Eyes: Negative.  Negative for visual disturbance.   Cardiovascular:  Positive for leg swelling (improves with elevation). Negative for chest pain, claudication, cyanosis, dyspnea on exertion, irregular heartbeat, near-syncope, orthopnea, palpitations, paroxysmal nocturnal dyspnea and syncope.   Respiratory:  Negative for cough, hemoptysis, shortness of breath, sleep disturbances due to breathing, snoring, sputum production and wheezing.    Endocrine: Negative.    Hematologic/Lymphatic: Negative.  Negative for adenopathy and bleeding problem. Does not bruise/bleed easily.   Skin:  Negative for color change, dry skin, nail changes, poor wound healing, rash, skin cancer and suspicious lesions.   Musculoskeletal: Negative.  Negative for back pain, falls, gout, joint pain and muscle weakness.   Gastrointestinal: Negative.  Negative for bloating, abdominal pain, anorexia, constipation, diarrhea, heartburn, hematemesis, hematochezia, hemorrhoids, melena, nausea and vomiting.   Genitourinary: Negative.  Negative for nocturia.   Neurological:  Negative for excessive daytime sleepiness, dizziness, focal weakness, headaches, light-headedness, paresthesias, tremors and weakness.   Psychiatric/Behavioral:  Negative for depression and memory loss. The patient does not have insomnia and is not nervous/anxious.        Objective:   Blood pressure 130/70, pulse 100, height 5' 2" (1.575 m), weight 69.2 kg (152 lb 8.9 oz), last menstrual period 06/20/1983.body mass index is 27.9 kg/m².  Blood pressure is up because of significant pain today  Physical Exam  Vitals and nursing note reviewed.   Constitutional:       General: She is not in acute distress.     Appearance: She is " well-developed. She is not diaphoretic.   HENT:      Head: Normocephalic and atraumatic.      Mouth/Throat:      Pharynx: No oropharyngeal exudate.   Eyes:      General: No scleral icterus.     Conjunctiva/sclera: Conjunctivae normal.      Pupils: Pupils are equal, round, and reactive to light.   Neck:      Vascular: No JVD.   Cardiovascular:      Rate and Rhythm: Regular rhythm. Tachycardia present.      Chest Wall: PMI is not displaced.      Heart sounds: Normal heart sounds. No murmur heard.     No friction rub. No gallop.   Pulmonary:      Effort: Pulmonary effort is normal. No respiratory distress.      Breath sounds: Normal breath sounds. No wheezing or rales.   Chest:      Chest wall: No tenderness.   Abdominal:      General: Bowel sounds are normal. There is no distension.      Palpations: Abdomen is soft. There is no mass.      Tenderness: There is no abdominal tenderness. There is no guarding or rebound.   Musculoskeletal:         General: No tenderness. Normal range of motion.      Cervical back: Normal range of motion and neck supple.      Comments: 1+ pitting edema bilateral lower extremities. No socks on today   Skin:     General: Skin is warm and dry.      Coloration: Skin is not pale.      Findings: No erythema or rash.   Neurological:      Mental Status: She is alert and oriented to person, place, and time.   Psychiatric:         Behavior: Behavior normal.         Thought Content: Thought content normal.         Judgment: Judgment normal.         Lab Results   Component Value Date    WBC 3.52 (L) 11/15/2024    HGB 9.3 (L) 11/15/2024    HCT 29.8 (L) 11/15/2024    MCV 92 11/15/2024     11/15/2024    CO2 26 11/15/2024    CREATININE 3.9 (H) 11/15/2024    CALCIUM 8.4 (L) 11/15/2024    ALBUMIN 3.1 (L) 11/15/2024    AST 30 11/15/2024     (H) 11/12/2024    ALT 20 11/15/2024    .3 (H) 10/01/2024       Lab Results   Component Value Date    INR 1.0 11/12/2024    INR 1.0 07/20/2024    INR  "0.9 07/19/2024       BNP   Date Value Ref Range Status   11/12/2024 764 (H) 0 - 99 pg/mL Final     Comment:     Values of less than 100 pg/ml are consistent with non-CHF populations.   09/11/2024 491 (H) 0 - 99 pg/mL Final     Comment:     Values of less than 100 pg/ml are consistent with non-CHF populations.   09/10/2024 441 (H) 0 - 99 pg/mL Final     Comment:     Values of less than 100 pg/ml are consistent with non-CHF populations.       LD   Date Value Ref Range Status   08/02/2007 315 (H) 110 - 260 U/L Final     No results found for: "SIROLIMUS"  Cyclosporine, LC/MS   Date Value Ref Range Status   11/15/2024 65 (L) 100 - 400 ng/mL Final     Comment:     Reference Normals:  For Kidney Transplants: 100-300 ng/mL    Testing performed by Liquid Chromatography-Tandem  Mass Spectrometry (LC-MS/MS).    This test was developed and its performance characteristics  determined by [facility], Department of Pathology  and Laboratory Medicine in a manner consistent with CLIA  requirements. It has not been cleared or approved by the [place]  Food and Drug Administration.  This test is used for clinical  purposes.  It should not be regarded as investigational or for  research.         Assessment:     1. Heart transplanted    2. CKD (chronic kidney disease) stage 4, GFR 15-29 ml/min          Plan:   Patient is 30 years post OHT, in pain from her backa nd having issues with walking post stimulator but better than last visit.  Hypertension- in severe pain today, states usually stable, this is due to pain. No change but will make sure improves.   CKD- Followed closely by Yvette carmona at [facility]. Encourage drinking more fluids, will hopefully do better with increased hydration.   Aortic atherosclerosis/hyperlipidemia- never tolerated statin before, will see if there is an ability to try again    Return instructions as set forth by post transplant schedule or as needed:    Clinic: Return for labs and/or biopsy weekly the " first month, every two weeks during month 2 and then monthly for the first year at the provider or coordinator's discretion. During the second year, return to clinic every 3 months. Post transplant year 3-5 return every 6 months. There will be a comprehensive post transplant evaluation every year that may include LHC/RHC/biopsy, stress test, echo, CXR, and other health screening exams.    In addition to the clinical assessment, I have ordered Allomap testing for this patient to assist in identification of moderate/severe acute cellular rejection (ACR) in a pt with stable Allograft function instead of endomyocardial biopsy.     Patient is reminded to call with any health changes as these can be early signs of transplant complications. Patient is advised to make sure any new medications or changes of old medications are discussed with a pharmacist or physician knowledgeable with transplant to avoid rejection/drug toxicity related to significant drug interactions.    UNOS Patient Status  Functional Status: 80% - Normal activity with effort: some symptoms of disease  Physical Capacity: No Limitations  Working for Income: No  If no, reason not working: Demands of Treatment    Courtney Tubbs MD

## 2024-10-09 ENCOUNTER — OFFICE VISIT (OUTPATIENT)
Dept: SURGERY | Facility: CLINIC | Age: 69
End: 2024-10-09
Payer: MEDICARE

## 2024-10-09 VITALS
WEIGHT: 151.69 LBS | SYSTOLIC BLOOD PRESSURE: 149 MMHG | BODY MASS INDEX: 27.74 KG/M2 | DIASTOLIC BLOOD PRESSURE: 86 MMHG | HEART RATE: 91 BPM

## 2024-10-09 DIAGNOSIS — K43.9 VENTRAL HERNIA WITHOUT OBSTRUCTION OR GANGRENE: Primary | ICD-10-CM

## 2024-10-09 PROCEDURE — 3077F SYST BP >= 140 MM HG: CPT | Mod: HCNC,CPTII,S$GLB, | Performed by: SURGERY

## 2024-10-09 PROCEDURE — 3008F BODY MASS INDEX DOCD: CPT | Mod: HCNC,CPTII,S$GLB, | Performed by: SURGERY

## 2024-10-09 PROCEDURE — 3066F NEPHROPATHY DOC TX: CPT | Mod: HCNC,CPTII,S$GLB, | Performed by: SURGERY

## 2024-10-09 PROCEDURE — 99214 OFFICE O/P EST MOD 30 MIN: CPT | Mod: HCNC,S$GLB,, | Performed by: SURGERY

## 2024-10-09 PROCEDURE — 1125F AMNT PAIN NOTED PAIN PRSNT: CPT | Mod: HCNC,CPTII,S$GLB, | Performed by: SURGERY

## 2024-10-09 PROCEDURE — 99999 PR PBB SHADOW E&M-EST. PATIENT-LVL III: CPT | Mod: PBBFAC,HCNC,, | Performed by: SURGERY

## 2024-10-09 PROCEDURE — 3044F HG A1C LEVEL LT 7.0%: CPT | Mod: HCNC,CPTII,S$GLB, | Performed by: SURGERY

## 2024-10-09 PROCEDURE — 3079F DIAST BP 80-89 MM HG: CPT | Mod: HCNC,CPTII,S$GLB, | Performed by: SURGERY

## 2024-10-09 PROCEDURE — 1157F ADVNC CARE PLAN IN RCRD: CPT | Mod: HCNC,CPTII,S$GLB, | Performed by: SURGERY

## 2024-10-14 ENCOUNTER — OFFICE VISIT (OUTPATIENT)
Dept: PALLIATIVE MEDICINE | Facility: CLINIC | Age: 69
End: 2024-10-14
Payer: MEDICARE

## 2024-10-14 VITALS
SYSTOLIC BLOOD PRESSURE: 134 MMHG | WEIGHT: 147.69 LBS | HEIGHT: 62 IN | TEMPERATURE: 98 F | BODY MASS INDEX: 27.18 KG/M2 | HEART RATE: 83 BPM | OXYGEN SATURATION: 98 % | DIASTOLIC BLOOD PRESSURE: 88 MMHG

## 2024-10-14 DIAGNOSIS — I50.32 CHRONIC DIASTOLIC (CONGESTIVE) HEART FAILURE: Chronic | ICD-10-CM

## 2024-10-14 DIAGNOSIS — M48.07 SPINAL STENOSIS OF LUMBOSACRAL REGION: Chronic | ICD-10-CM

## 2024-10-14 DIAGNOSIS — N18.5 CKD (CHRONIC KIDNEY DISEASE) STAGE 5, GFR LESS THAN 15 ML/MIN: Chronic | ICD-10-CM

## 2024-10-14 DIAGNOSIS — Z51.5 ENCOUNTER FOR PALLIATIVE CARE: ICD-10-CM

## 2024-10-14 DIAGNOSIS — Q21.12 PATENT FORAMEN OVALE: Primary | Chronic | ICD-10-CM

## 2024-10-14 DIAGNOSIS — Z94.1 HEART TRANSPLANTED: Chronic | ICD-10-CM

## 2024-10-14 PROCEDURE — 1123F ACP DISCUSS/DSCN MKR DOCD: CPT | Mod: HCNC,CPTII,S$GLB, | Performed by: NURSE PRACTITIONER

## 2024-10-14 PROCEDURE — 99999 PR PBB SHADOW E&M-EST. PATIENT-LVL V: CPT | Mod: PBBFAC,HCNC,, | Performed by: NURSE PRACTITIONER

## 2024-10-14 PROCEDURE — 3044F HG A1C LEVEL LT 7.0%: CPT | Mod: HCNC,CPTII,S$GLB, | Performed by: NURSE PRACTITIONER

## 2024-10-14 PROCEDURE — 99215 OFFICE O/P EST HI 40 MIN: CPT | Mod: HCNC,S$GLB,, | Performed by: NURSE PRACTITIONER

## 2024-10-14 PROCEDURE — 3288F FALL RISK ASSESSMENT DOCD: CPT | Mod: HCNC,CPTII,S$GLB, | Performed by: NURSE PRACTITIONER

## 2024-10-14 PROCEDURE — 3066F NEPHROPATHY DOC TX: CPT | Mod: HCNC,CPTII,S$GLB, | Performed by: NURSE PRACTITIONER

## 2024-10-14 PROCEDURE — 3075F SYST BP GE 130 - 139MM HG: CPT | Mod: HCNC,CPTII,S$GLB, | Performed by: NURSE PRACTITIONER

## 2024-10-14 PROCEDURE — 1125F AMNT PAIN NOTED PAIN PRSNT: CPT | Mod: HCNC,CPTII,S$GLB, | Performed by: NURSE PRACTITIONER

## 2024-10-14 PROCEDURE — 3008F BODY MASS INDEX DOCD: CPT | Mod: HCNC,CPTII,S$GLB, | Performed by: NURSE PRACTITIONER

## 2024-10-14 PROCEDURE — 3079F DIAST BP 80-89 MM HG: CPT | Mod: HCNC,CPTII,S$GLB, | Performed by: NURSE PRACTITIONER

## 2024-10-14 PROCEDURE — 99497 ADVNCD CARE PLAN 30 MIN: CPT | Mod: HCNC,S$GLB,, | Performed by: NURSE PRACTITIONER

## 2024-10-14 PROCEDURE — 1101F PT FALLS ASSESS-DOCD LE1/YR: CPT | Mod: HCNC,CPTII,S$GLB, | Performed by: NURSE PRACTITIONER

## 2024-10-14 PROCEDURE — 1159F MED LIST DOCD IN RCRD: CPT | Mod: HCNC,CPTII,S$GLB, | Performed by: NURSE PRACTITIONER

## 2024-10-14 NOTE — PROGRESS NOTES
General Surgery Clinic  Follow-Up    Patient Name: Nadia Damon  YOB: 1955 (69 y.o.)  MRN: [id number]  Today's Date: 10/14/2024    Referring Md:   Self, Aaareferral  No address on file    SUBJECTIVE:     Chief Complaint:  Ventral hernia    History of Present Illness:  Nadia Damon is a 69 y.o. female with past medical history of heart transplant status post left breast lumpectomy 09/10/2021 with sentinel lymph node biopsy 10/12/2021 status post Port-A-Cath placement 11/21 presents to further discuss ventral hernia.  She reports chronic nausea intermittently in his concerned this is related to her hernia.  She reports the hernia will bulge out when she is up and moving around and go back down when lying flat.  She continues to follow with transplant Cardiology for her previous heart transplant and reports 1 of her valve is leaking and may require treatment.    Review of patient's allergies indicates:   Allergen Reactions    Lisinopril Swelling and Rash    Hydrocodone-acetaminophen Nausea Only    Augmentin [amoxicillin-pot clavulanate] Diarrhea    Zyvox [linezolid] Nausea And Vomiting       Past Medical History:   Diagnosis Date    Abdominal wall hernia     CT Renal 6/11/2018---Small fat containing superior ventral abdominal wall hernia at the epicardial pacing lead site.    Abnormal mammogram 10/12/2021    Acute cystitis without hematuria 05/10/2022    Acute ischemic right middle cerebral artery (MCA) stroke 07/20/2024    GRACE (acute kidney injury) 11/22/2021    Anxiety     Arthritis     ZEN HIPS    Breast cancer in female 08/2021    LEFT BREAST    Bronchitis 08/18/2016    Never smoked      C. difficile colitis 11/29/2021    Cellulitis of axilla, left 12/23/2021    Chronic diastolic heart failure 12/16/2021    Chronic kidney disease     stage 4, GFR 15-29 ml/min    Chronic midline low back pain without sciatica 06/18/2018    Closed nondisplaced fracture of distal phalanx of left great toe with  routine healing 10/22/2018    Coronary artery disease 1993    heart transplant    COVID-19 in immunocompromised patient 02/26/2024    Cystitis 05/10/2022    Depression     Encounter for blood transfusion     Fibromyalgia     on Lyrica    Heart failure     native heart cardiomyopathy    Heart transplanted 1993    due to cardiomyopathy    History of hyperparathyroidism; Hyperparathyroidism, secondary renal     PT DENIES    Hypertension     Immune disorder     anti rejection meds    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    Impaired mobility 07/28/2022    Iron deficiency anemia 08/15/2017    Kidney stones     passed per pt    Obesity     Obesity (BMI 30.0-34.9) 07/22/2019    Other osteoporosis without current pathological fracture 08/30/2019    Other pancytopenia 05/06/2024    Parotitis, acute 09/19/2023    Pharyngitis 01/09/2019    Pneumonia due to infectious organism 03/14/2024    PONV (postoperative nausea and vomiting)     Severe sepsis 11/22/2021    Shingles 2003 approx    left leg    Subclinical hypothyroidism 06/16/2023    Thrombocytopenia, unspecified 11/29/2021    Trouble in sleeping     Urinary incontinence      Past Surgical History:   Procedure Laterality Date    bladder implant Right 06/11/2024    BLADDER SURGERY  2015 approx    mesh - Dr Everett then 2nd reconstructive sx Dr Onofre    BREAST BIOPSY Bilateral     NEGATIVE    BREAST BIOPSY Right 10/31/2022    benign    BREAST LUMPECTOMY Left 2021    BREAST SURGERY Left 09/28/2015    Bx - benign    BREAST SURGERY Right 12/2015    Bx benign    CARDIAC PACEMAKER REMOVAL Left 06/26/2014    Pacer defirillator removed. Put in 1993 aat time of heart transplant    CARPAL TUNNEL RELEASE Left 03/03/2015    Dr. Hall    COLONOSCOPY N/A 02/25/2021    Procedure: COLONOSCOPY;  Surgeon: Freida Ramirez MD;  Location: [facility];  Service: Endoscopy;  Laterality: N/A;    CYSTOCELE REPAIR      Twice with mesh removal    EPIDURAL STEROID INJECTION INTO  CERVICAL SPINE N/A 02/02/2023    Procedure: T11/T12 IL HELLEN;  Surgeon: Jassi Pierre MD;  [facility];  Service: Pain Management;  Laterality: N/A;    EPIDURAL STEROID INJECTION INTO CERVICAL SPINE N/A 9/16/2024    Procedure: T11/T12 IL HELLEN;  Surgeon: Jassi Pierre MD;  [facility];  Service: Pain Management;  Laterality: N/A;    HEART TRANSPLANT  1993    HERNIA REPAIR Right 1971 approx    Inguinal    HYSTERECTOMY  1983    vag hyst /LSO     IMPLANTATION OF PERMANENT SACRAL NERVE STIMULATOR N/A 06/11/2024    Procedure: INSERTION, NEUROSTIMULATOR, PERMANENT, SACRAL;  Surgeon: Sami Cochran MD;  Location: [facility];  Service: Urology;  Laterality: N/A;    INCISION AND DRAINAGE OF ABSCESS Left 12/24/2021    Procedure: INCISION AND DRAINAGE, ABSCESS;  Surgeon: Joseph Longo MD;  Location: [facility];  Service: General;  Laterality: Left;    INJECTION OF ANESTHETIC AGENT AROUND MEDIAL BRANCH NERVES INNERVATING LUMBAR FACET JOINT Right 10/19/2022    Procedure: Right L4/L5 and L5/S1 MBB;  Surgeon: Jassi Pierre MD;  Location: [facility];  Service: Pain Management;  Laterality: Right;    INJECTION OF ANESTHETIC AGENT AROUND MEDIAL BRANCH NERVES INNERVATING LUMBAR FACET JOINT Right 11/09/2022    Procedure: Right L4/L5 and L5/S1 MBB;  Surgeon: Jassi Pierre MD;  Location: [facility];  Service: Pain Management;  Laterality: Right;    INJECTION OF ANESTHETIC AGENT INTO SACROILIAC JOINT Right 08/22/2022    Procedure: Right SIJ Injection Right L5/S1 Facte Injection;  Surgeon: Jassi Pierre MD;  Location: [facility];  Service: Pain Management;  Laterality: Right;    INSERTION OF TUNNELED CENTRAL VENOUS CATHETER (CVC) WITH SUBCUTANEOUS PORT N/A 11/09/2021    Procedure: ONCFORVZY-BIZL-H-CATH;  Surgeon: Christoph Douglas MD;  Location: [facility];  Service: General;  Laterality: N/A;    RADIOFREQUENCY THERMOCOAGULATION Right 12/07/2022    Procedure: Right L4/L5 and L5/S1 Lumbar RFA;   Surgeon: Jassi Pierre MD;  Location: [facility];  Service: Pain Management;  Laterality: Right;    REMOVAL OF VASCULAR ACCESS PORT      ROBOT-ASSISTED LAPAROSCOPIC ABDOMINAL SACROCOLPOPEXY N/A 08/10/2023    Procedure: ROBOTIC SACROCOLPOPEXY, ABDOMEN;  Surgeon: PRANAY Villalobos MD;  Location: [facility];  Service: OB/GYN;  Laterality: N/A;    ROBOT-ASSISTED LAPAROSCOPIC OOPHORECTOMY Right 08/10/2023    Procedure: ROBOTIC OOPHORECTOMY;  Surgeon: PRANAY Villalobos MD;  Location: [facility];  Service: OB/GYN;  Laterality: Right;    SENTINEL LYMPH NODE BIOPSY Left 10/12/2021    Procedure: BIOPSY, LYMPH NODE, SENTINEL;  Surgeon: Christoph Douglas MD;  Location: [facility];  Service: General;  Laterality: Left;    TOE SURGERY      XI ROBOTIC URETHROPEXY N/A 08/10/2023    Procedure: XI ROBOTIC URETHROPEXY;  Surgeon: PRANAY Villalobos MD;  Location: [facility];  Service: OB/GYN;  Laterality: N/A;     Family History   Problem Relation Name Age of Onset    Cancer Mother  38        breast    Breast cancer Mother      Breast cancer Maternal Grandmother      Heart disease Maternal Grandmother      Hypertension Son      Cataracts Cousin      Diabetes Neg Hx      Stroke Neg Hx      Kidney disease Neg Hx      Asthma Neg Hx      COPD Neg Hx      Melanoma Neg Hx      Hyperlipidemia Neg Hx       Social History     Tobacco Use    Smoking status: Never     Passive exposure: Never    Smokeless tobacco: Never   Substance Use Topics    Alcohol use: Never     Alcohol/week: 0.0 standard drinks of alcohol    Drug use: No        Review of Systems:  Review of Systems   Constitutional:  Negative for chills and fever.   HENT:  Negative for congestion and sore throat.    Respiratory:  Negative for cough and shortness of breath.    Cardiovascular:  Negative for chest pain and leg swelling.   Gastrointestinal:  Negative for abdominal pain, nausea and vomiting.   Genitourinary:  Negative for dysuria.   Musculoskeletal:  Negative for myalgias.    Skin:  Negative for rash.   Neurological:  Negative for weakness and headaches.       OBJECTIVE:     Vital Signs (Most Recent)  BP (!) 149/86   Pulse 91   Wt 68.8 kg (151 lb 10.8 oz)   LMP 06/20/1983 (Within Years)   BMI 27.74 kg/m²     Physical Exam  Constitutional:       Appearance: She is well-developed.   HENT:      Head: Normocephalic and atraumatic.      Right Ear: External ear normal.      Left Ear: External ear normal.      Mouth/Throat:      Pharynx: No oropharyngeal exudate.   Eyes:      General: No scleral icterus.        Right eye: No discharge.         Left eye: No discharge.      Conjunctiva/sclera: Conjunctivae normal.      Pupils: Pupils are equal, round, and reactive to light.   Neck:      Thyroid: No thyromegaly.   Cardiovascular:      Rate and Rhythm: Normal rate.   Pulmonary:      Effort: Pulmonary effort is normal.   Chest:   Breasts:     Right: No inverted nipple, mass, nipple discharge, skin change or tenderness.      Left: No inverted nipple, mass, nipple discharge, skin change or tenderness.       Abdominal:      Palpations: Abdomen is soft.       Musculoskeletal:         General: Normal range of motion.      Right shoulder: No crepitus. Normal strength.      Cervical back: Normal range of motion and neck supple.   Lymphadenopathy:      Head:      Right side of head: No submental, submandibular, tonsillar, preauricular, posterior auricular or occipital adenopathy.      Left side of head: No submental, submandibular, tonsillar, preauricular, posterior auricular or occipital adenopathy.      Cervical: No cervical adenopathy.      Right cervical: No superficial or posterior cervical adenopathy.     Left cervical: No superficial or posterior cervical adenopathy.      Upper Body:      Right upper body: No supraclavicular adenopathy.      Left upper body: No supraclavicular adenopathy.   Skin:     General: Skin is warm and dry.      Coloration: Skin is not pale.      Findings: No erythema or  rash.   Neurological:      Mental Status: She is alert and oriented to person, place, and time.      Deep Tendon Reflexes: Reflexes are normal and symmetric.   Psychiatric:         Behavior: Behavior normal.         Thought Content: Thought content normal.         Judgment: Judgment normal.       CT:   There is a herniation in the anterior wall of the upper abdomen with herniation of a portion of the left hepatic lobe extending into the hernia sac which has increased since the prior CT in 2023.       ASSESSMENT/PLAN:     Nadia Damon is a 66 y.o. female status post left breast lumpectomy/sentinel lymph node biopsy    Ventral hernia    Symptoms and CT reviewed with patient  Hernias reducible containing portion of the liver no bowel noted on exam or imaging  Discussed that the hernia may not be causing her nausea.  Can consider repair of her hernia however discussing her underlying heart condition she would likely need further evaluation, possible treatment with her cardiac transplant prior to any hernia surgery.  If she gets to a point where she can be cleared for hernia surgery she will follow up to further discuss hernia repair    30 minutes of total time spent on the encounter, which includes face to face time and non-face to face time preparing to see the patient (eg, review of tests), Obtaining and/or reviewing separately obtained history, Documenting clinical information in the electronic or other health record, Independently interpreting results (not separately reported) and communicating results to the patient/family/caregiver, or Care coordination (not separately reported).

## 2024-10-14 NOTE — ASSESSMENT & PLAN NOTE
Goals of care: Ms. Damon would like to improve her physical strength and endurance with hopes to return to previous level of activity.  Advanced directive: Full code. Confirmed wishes stated in living will.  HC POA: Moy Watkins Sr   Symptoms: edema, fatigue, back pain  Follow up: 3 months

## 2024-10-14 NOTE — PROGRESS NOTES
Palliative Medicine Clinic Note        Consult Requested By: Dr. Courtney Tubbs      Reason for Consult: symptom management, goals of care, and advanced care planning    Chief Complaint:   Chief Complaint   Patient presents with    [facility]    Heart Failure        ASSESSMENT/PLAN:      Plan/Recommendations:  Problem List Items Addressed This Visit          Neuro    Spinal stenosis of lumbosacral region (Chronic)     Back pain is interfering with mobility.  Followed by PCP and pain mgmt.            Cardiac/Vascular    Patent foramen ovale - Primary (Chronic)     Followed by transplant cardiology and local cardiologist.  Low ROPE score + advanced age.         Chronic diastolic (congestive) heart failure (Chronic)     Continues f/u with cardiology and PCP.         Heart transplanted (Chronic)       Renal/    CKD (chronic kidney disease) stage 5, GFR less than 15 ml/min (Chronic)     Progressing from CKD 4-5 now.   We discussed process of HD, what sx may look like if she opted no HD.   She feels like proceeding with HD is the only way she can continue to live.   Followed closely by nephrology.              Palliative Care    Encounter for palliative care     Goals of care: Ms. Damon would like to improve her physical strength and endurance with hopes to return to previous level of activity.  Advanced directive: Full code. Confirmed wishes stated in living will.  HC POA: Moy Watkins Sr   Symptoms: edema, fatigue, back pain  Follow up: 3 months              Advance Care Planning   Advance Directives:   Living Will: Yes        Copy on chart: Yes    Medical Power of : Yes      Decision Making:  Patient answered questions  Goals of Care: The patient endorses that what is most important right now is to focus on remaining as independent as possible and curative/life-prolongation (regardless of treatment burdens)    Accordingly, we have decided that the best plan to meet the patient's goals includes continuing  "with treatment.              Thank you for involving me in the care of this patient.     No follow-ups on file.    Future Appointments   Date Time Provider Department Center   10/16/2024  9:40 AM Elis Wick MD [facility]   10/17/2024  1:00 PM Kristine Delgado, NP [facility]   10/23/2024 10:30 AM LABORATORY, [facility]   11/25/2024  1:30 PM Guicho Godinez MD [facility] ENT [facility]   12/12/2024 10:10 AM LABORATORY, [facility]   12/19/2024  1:30 PM Carter Crawford MD [facility] NEPHRO [place]   1/16/2025 11:00 AM LABORATORY, [facility]   1/17/2025 10:00 AM Sami Cochran MD [facility] UROLOGY [facility]   1/23/2025  2:30 PM Prasanth Johnson MD [facility] RHEU [facility]   4/9/2025 10:40 AM [facility]        SUBJECTIVE:      History of Present Illness / Interval History:  Nadia Damon is 69 y.o. female wi th HFpEF, s/p heart transplant 1993, CKD 4-5, anemia of chronic disease, breast cancer, spinal stenosis, urinary incontinence.      Presents to Palliative Care Clinic for advance care planning,, clarification of goals of care, and additional support..     Ms Damon is well known to me from primary care. However, since then she has experienced a CVA, progression of CKD and HFpEF.       10/14/24  History obtained from: patient and medical record.    Had CVA - affected speech and right HP.   Tx at  Rehab. Now speech at baseline and ambulates with cane.     Lots of back pain after CVA/rehab. Followed by pain mgmt.   Doesn't want to take pain rx.     Followed by Dr King, ECHO done.   "Leaky valve," in heart per pt. PFO per ECHO 7/2024  Per pt was told not operable.   Swelling is worse overall per pt.     Considering HD.  Doesn't want HD until she "absolutely has to."   Feels like she has to "do something."    Excited about new great grandson! Wants to go visit in [place].   Barbara went long term, now going blind. "   Ms Damon not speaking much with son.     Goal - walk more, continue to get stronger with PT.  Starting water therapy tomorrow. Would like to be able to drive again.   Misses [employer] and being active!     Would not want long term support on ventilator.     Desires full code. Confirmed living will and and HC POA completed in 2022. Wishes are unchanged.   Sonido Watkins Sr [phone] is HC POA.      Chart Review:  10/8/24 Transplant cardiology, Dr Tubbs  Today is recovering form a bladder stimulator, had the bladder pulled up, surgery went well but recovering from it still. Had a neurostimulator sacral placed in 06/11/24. Did have a fall in between went to ED, got pain med, injection. Going to spinal specialist for back issues. Doing well from cardiac standpoint.   Most significant issue is the back pain issue, stable from our standpoint. Is walking with a limp since getting the neurostimulator placed, used a cane today, had a walker in initially post op, better but still major issue. Other issue since our last visit is CKD, remains an issue, followed by Dr. Crawford closely for this.   In addition to the clinical assessment, I have ordered Allomap testing for this patient to assist in identification of moderate/severe acute cellular rejection (ACR) in a pt with stable Allograft function instead of endomyocardial biopsy.   10/8/24 Nephrology, Dr Crawford  s Cr stable, slightly lower but overall worse.   Pt likely is approaching need for needing dialysis, slowly progressive CKD. Pt was advised  CKD stage 4, worsened to stage 5 (though there may be a component of GRACE)  will increase lasix to 40 mg po qd   Will increase veltassa from qod to qd.   Holding treatment with calcitriol, will re-start   9/13/24 PCP Dr Scott  Scheduled for spinal injection this coming Monday 9/16   pregabalin (LYRICA) 50 MG capsule; Take 1 capsule (50 mg total) by mouth 2 (two) times daily. Dispense: 180 capsule; Refill: 0     Today we  discussed role of palliative care, symptom management, goals of care, and advanced care planning.        ROS:  Review of Systems    Review of Symptoms      Symptom Assessment (ESAS 0-10 Scale)  Pain:  7  Dyspnea:  3  Anxiety:  0  Nausea:  0  Depression:  0  Anorexia:  0  Fatigue:  5  Insomnia:  5  Restlessness:  2  Agitation:  0     Constipation:  Negative  Diarrhea:  Negative      Bowel Management Plan (BMP):  No      Pain Assessment:    Location(s): back    Back       Location: lower        Quality: Aching, stabbing and shooting        Quantity: 7/10 in intensity        Chronicity: Onset 3 month(s) ago, unchanged        Aggravating Factors: Sitting up        Alleviating Factors: Movement       Associated Symptoms: None    ECOG Performance Status rdGrdrrdarddrderd:rd rd3rd Living Arrangements:  Lives alone and Lives in apartment    Psychosocial/Cultural:   See Palliative Psychosocial Note: Yes  Lives independently at [facility]. Much of her family is out of town. Lots of Rastafarian members are supportive. Values her independence.   **Primary  to Follow**  Palliative Care  Consult: No        Medications:    Current Outpatient Medications:     aspirin (ECOTRIN) 81 MG EC tablet, Take 1 tablet (81 mg total) by mouth once daily., Disp: , Rfl:     calcitRIOL (ROCALTROL) 0.25 MCG Cap, Take 1 capsule (0.25 mcg total) by mouth once daily., Disp: 30 capsule, Rfl: 11    carvediloL (COREG) 3.125 MG tablet, Take 1 tablet (3.125 mg total) by mouth 2 (two) times daily with meals. Hold parameters: SBP less than 110 and/or DBP less than 75, Disp: 180 tablet, Rfl: 3    cycloSPORINE modified, NEORAL, (NEORAL) 25 MG capsule, Take 3 capsules (75 mg total) by mouth 2 (two) times daily., Disp: 540 capsule, Rfl: 1    furosemide (LASIX) 20 MG tablet, Take 2 tablets (40 mg total) by mouth once daily. HOLD UNTIL FOLLOW UP WITH PCP, Disp: 60 tablet, Rfl: 11    hydrALAZINE (APRESOLINE) 25 MG tablet, Take 25 mg by mouth every 8 (eight)  hours. HOLD hydralazine for now; may restart if BP is elevated, Disp: , Rfl:     ondansetron (ZOFRAN) 4 MG tablet, Take 4 mg by mouth as needed for Nausea., Disp: , Rfl:     pantoprazole (PROTONIX) 20 MG tablet, TAKE 1 TABLET ONE TIME DAILY, Disp: 90 tablet, Rfl: 3    polyethylene glycol (GLYCOLAX) 17 gram PwPk, Take 17 g by mouth once daily., Disp: , Rfl:     pregabalin (LYRICA) 25 MG capsule, Take 1 capsule (25 mg total) by mouth every evening., Disp: 30 capsule, Rfl: 2    sodium bicarbonate 650 MG tablet, Take 1 tablet (650 mg total) by mouth 2 (two) times daily., Disp: 60 tablet, Rfl: 1    temazepam (RESTORIL) 30 mg capsule, Take 1 capsule (30 mg total) by mouth nightly as needed for Insomnia. FOR INSOMNIA, Disp: 90 capsule, Rfl: 1    tiZANidine 4 mg Cap, Take 2 mg by mouth nightly as needed (muscle spasm)., Disp: , Rfl:     vitamin E 400 UNIT capsule, Take 400 Units by mouth once daily., Disp: , Rfl:     zolpidem (AMBIEN) 5 MG Tab, Take 1 tablet (5 mg total) by mouth every evening., Disp: 90 tablet, Rfl: 1    External  database queried on 10/14/2024  by Luz CORTEZ :     N/A    Review of patient's allergies indicates:   Allergen Reactions    Lisinopril Swelling and Rash    Hydrocodone-acetaminophen Nausea Only    Augmentin [amoxicillin-pot clavulanate] Diarrhea    Zyvox [linezolid] Nausea And Vomiting        OBJECTIVE:   Physical Exam:  Vitals: Temp: 97.8 °F (36.6 °C) (10/14/24 0827)  Pulse: 83 (10/14/24 0827)  BP: 134/88 (10/14/24 0827)  SpO2: 98 % (10/14/24 0827)    Physical Exam  Constitutional:       General: She is not in acute distress.  HENT:      Mouth/Throat:      Mouth: Mucous membranes are moist.   Eyes:      General: No scleral icterus.  Pulmonary:      Effort: Pulmonary effort is normal.   Musculoskeletal:      Right lower leg: Edema present.      Left lower leg: Edema present.      Comments: Appears uncomfortable when standing. Slow gait with cane.   Skin:     General: Skin is warm  and dry.   Neurological:      Mental Status: She is alert. Mental status is at baseline.   Psychiatric:         Mood and Affect: Mood normal.         Behavior: Behavior normal.         Thought Content: Thought content normal.         Wt Readings from Last 3 Encounters:   10/14/24 0827 67 kg (147 lb 11.3 oz)   10/09/24 1058 68.8 kg (151 lb 10.8 oz)   10/08/24 1428 69.2 kg (152 lb 8.9 oz)     BP Readings from Last 3 Encounters:   10/14/24 134/88   10/09/24 (!) 149/86   10/08/24 130/70       Labs:  Lab Results   Component Value Date    WBC 3.45 (L) 10/01/2024    HGB 8.1 (L) 10/01/2024    HCT 25.9 (L) 10/01/2024    MCV 92 10/01/2024     10/01/2024       Sodium   Date Value Ref Range Status   10/01/2024 140 136 - 145 mmol/L Final     Potassium   Date Value Ref Range Status   10/01/2024 5.7 (H) 3.5 - 5.1 mmol/L Final     Comment:     *No Visible Hemolysis     Glucose   Date Value Ref Range Status   10/01/2024 79 70 - 110 mg/dL Final     BUN   Date Value Ref Range Status   10/01/2024 54 (H) 8 - 23 mg/dL Final     Creatinine   Date Value Ref Range Status   10/01/2024 4.0 (H) 0.5 - 1.4 mg/dL Final     Calcium   Date Value Ref Range Status   10/01/2024 8.9 8.7 - 10.5 mg/dL Final     Total Protein   Date Value Ref Range Status   09/11/2024 7.8 6.0 - 8.4 g/dL Final     Albumin   Date Value Ref Range Status   10/01/2024 3.3 (L) 3.5 - 5.2 g/dL Final     AST   Date Value Ref Range Status   09/11/2024 42 (H) 10 - 40 U/L Final     ALT   Date Value Ref Range Status   09/11/2024 29 10 - 44 U/L Final     eGFR   Date Value Ref Range Status   10/01/2024 11.5 (A) >60 mL/min/1.73 m^2 Final       Imaging:  TTE 07/20/2024:    Left Ventricle: The left ventricle is normal in size. Normal wall thickness. There is normal systolic function with a visually estimated ejection fraction of 55 - 60%. There is normal diastolic function.    Right Ventricle: Normal right ventricular cavity size. Wall thickness is normal. Systolic function is  normal.    Left Atrium: Patent foramen ovale visualized with predominant right to left shunting indicated by saline contrast.    Tricuspid Valve: There is mild regurgitation.    IVC/SVC: Normal venous pressure at 3 mmHg.    The patient is status post cardiac transplantation.  PFO not present with bubble alone, but with valsalva had very small bubbles come across       I spent a total of 45 minutes on the day of the visit. This includes face to face time in discussion of goals of care, symptom assessment, coordination of care and emotional support.  This also includes non-face to face time preparing to see the patient (eg, review of tests/imaging), obtaining and/or reviewing separately obtained history, documenting clinical information in the electronic or other health record, independently interpreting results and communicating results to the patient/family/caregiver, or care coordinator.     Additional 25 min time spent on a voluntary advance care planning and /or goals of care discussion, providing emotional support, formulating and communicating prognosis and exploring burden/benefit of various approaches of treatment.       Luz Dickson, ONELIA

## 2024-10-14 NOTE — ASSESSMENT & PLAN NOTE
Progressing from CKD 4-5 now.   We discussed process of HD, what sx may look like if she opted no HD.   She feels like proceeding with HD is the only way she can continue to live.   Followed closely by nephrology.

## 2024-10-15 ENCOUNTER — TELEPHONE (OUTPATIENT)
Dept: PRIMARY CARE CLINIC | Facility: CLINIC | Age: 69
End: 2024-10-15
Payer: MEDICARE

## 2024-10-15 NOTE — TELEPHONE ENCOUNTER
Called and spoke with patient; scheduled initial LCSW appt for 11/4/24. Patient has complicated schedule due to many therapy and MD appts.

## 2024-10-16 ENCOUNTER — OFFICE VISIT (OUTPATIENT)
Dept: PRIMARY CARE CLINIC | Facility: CLINIC | Age: 69
End: 2024-10-16
Payer: MEDICARE

## 2024-10-16 VITALS
SYSTOLIC BLOOD PRESSURE: 152 MMHG | TEMPERATURE: 97 F | DIASTOLIC BLOOD PRESSURE: 80 MMHG | BODY MASS INDEX: 27.81 KG/M2 | HEIGHT: 62 IN | HEART RATE: 88 BPM | OXYGEN SATURATION: 96 % | WEIGHT: 151.13 LBS

## 2024-10-16 DIAGNOSIS — I10 ESSENTIAL HYPERTENSION: Chronic | ICD-10-CM

## 2024-10-16 DIAGNOSIS — Z94.1 HEART TRANSPLANTED: Chronic | ICD-10-CM

## 2024-10-16 DIAGNOSIS — Z23 NEED FOR VACCINATION: ICD-10-CM

## 2024-10-16 DIAGNOSIS — D63.1 ANEMIA ASSOCIATED WITH STAGE 4 CHRONIC RENAL FAILURE: Chronic | ICD-10-CM

## 2024-10-16 DIAGNOSIS — N18.4 ANEMIA ASSOCIATED WITH STAGE 4 CHRONIC RENAL FAILURE: Chronic | ICD-10-CM

## 2024-10-16 DIAGNOSIS — R00.2 RAPID PALPITATIONS: ICD-10-CM

## 2024-10-16 DIAGNOSIS — I20.89 ANGINA OF EFFORT: ICD-10-CM

## 2024-10-16 DIAGNOSIS — M51.35 DDD (DEGENERATIVE DISC DISEASE), THORACOLUMBAR: Chronic | ICD-10-CM

## 2024-10-16 DIAGNOSIS — M48.07 SPINAL STENOSIS OF LUMBOSACRAL REGION: Primary | Chronic | ICD-10-CM

## 2024-10-16 DIAGNOSIS — Q21.12 PATENT FORAMEN OVALE: Chronic | ICD-10-CM

## 2024-10-16 PROBLEM — R47.01 APHASIA: Status: RESOLVED | Noted: 2024-07-19 | Resolved: 2024-10-16

## 2024-10-16 PROBLEM — Z51.5 ENCOUNTER FOR PALLIATIVE CARE: Status: RESOLVED | Noted: 2024-10-14 | Resolved: 2024-10-16

## 2024-10-16 PROCEDURE — 3079F DIAST BP 80-89 MM HG: CPT | Mod: HCNC,CPTII,S$GLB, | Performed by: INTERNAL MEDICINE

## 2024-10-16 PROCEDURE — G0008 ADMIN INFLUENZA VIRUS VAC: HCPCS | Mod: HCNC,S$GLB,, | Performed by: INTERNAL MEDICINE

## 2024-10-16 PROCEDURE — 1125F AMNT PAIN NOTED PAIN PRSNT: CPT | Mod: HCNC,CPTII,S$GLB, | Performed by: INTERNAL MEDICINE

## 2024-10-16 PROCEDURE — 3288F FALL RISK ASSESSMENT DOCD: CPT | Mod: HCNC,CPTII,S$GLB, | Performed by: INTERNAL MEDICINE

## 2024-10-16 PROCEDURE — 3077F SYST BP >= 140 MM HG: CPT | Mod: HCNC,CPTII,S$GLB, | Performed by: INTERNAL MEDICINE

## 2024-10-16 PROCEDURE — 3008F BODY MASS INDEX DOCD: CPT | Mod: HCNC,CPTII,S$GLB, | Performed by: INTERNAL MEDICINE

## 2024-10-16 PROCEDURE — 1101F PT FALLS ASSESS-DOCD LE1/YR: CPT | Mod: HCNC,CPTII,S$GLB, | Performed by: INTERNAL MEDICINE

## 2024-10-16 PROCEDURE — 1123F ACP DISCUSS/DSCN MKR DOCD: CPT | Mod: HCNC,CPTII,S$GLB, | Performed by: INTERNAL MEDICINE

## 2024-10-16 PROCEDURE — 99999 PR PBB SHADOW E&M-EST. PATIENT-LVL IV: CPT | Mod: PBBFAC,HCNC,, | Performed by: INTERNAL MEDICINE

## 2024-10-16 PROCEDURE — 3044F HG A1C LEVEL LT 7.0%: CPT | Mod: HCNC,CPTII,S$GLB, | Performed by: INTERNAL MEDICINE

## 2024-10-16 PROCEDURE — 3066F NEPHROPATHY DOC TX: CPT | Mod: HCNC,CPTII,S$GLB, | Performed by: INTERNAL MEDICINE

## 2024-10-16 PROCEDURE — 99215 OFFICE O/P EST HI 40 MIN: CPT | Mod: HCNC,S$GLB,, | Performed by: INTERNAL MEDICINE

## 2024-10-16 PROCEDURE — 90653 IIV ADJUVANT VACCINE IM: CPT | Mod: HCNC,S$GLB,, | Performed by: INTERNAL MEDICINE

## 2024-10-16 NOTE — PATIENT INSTRUCTIONS
If you are feeling unwell, we'd like to be the first ones to know here at [facility]! Please give us a call. Same day appointments are our top priority to keep you well and out of the emergency rooms and hospitals. Call [phone] for our direct line. After hours advice is always available. Please call [phone] after hours to speak to the on-call team.      Recommend Covid and RSV vaccines that can be scheduled at your pharmacy of choice.    Recommend contact your Cardiologist for f/u appointment soon given recent increased heart rate and palpitations and angina (heart pressure)

## 2024-10-16 NOTE — PROGRESS NOTES
Nadia Damon  10/16/2024  [id number]    Elis Wick MD  Patient Care Team:  Elis Wick MD as PCP - General (Internal Medicine)  Miguel Soni Jr., MD as Consulting Physician (Vascular Surgery)  Ike King MD as Consulting Physician (Cardiology)  Courtney Tubbs MD as Consulting Physician (Cardiology)  Carter Crawford MD as Consulting Physician (Nephrology)  Paxton Vasques OD as Consulting Physician (Optometry)  PRANAY Villalobos MD as Obstetrician (Obstetrics)  Parker Mccarthy IV, MD (Urology)  Ike King MD as Consulting Physician (Cardiology)  Jose Roland MD as Consulting Physician (Dermatology)  Prasanth Johnson MD as Consulting Physician (Rheumatology)  Elis Wick MD as Physician (Internal Medicine)  Lexi Bianchi LCSW as  (Hematology and Oncology)  Candace Saleh LMSW as  (Hematology and Oncology)  Kelsie Burk PA-C as Physician Assistant (Hematology and Oncology)  Chelsea Monte as ED Navigator    Visit Type: Follow-up    History of Present Illness: Ms. Damon is a 68 year old female here for scheduled f/u.     Ms. Damon w h/o heart transplant 1993, on anti-rejection medication. 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 She was treated with 1 cycle of systemic chemotherapy cytoxan and taxotere and udenyca that was discontinued due to toxicity. She has been followed by radiation oncology and completed last radiation treatment 5/14/22. 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      Ms. Damon suffered CVA 7/19/24 with subsequent hospitalization then transfer to [facility] to address on-going dysarthria and R-sided weakness. She also has severe spinal stenosis.      Prior to CVA: Interstim placed R hip 6/11/24 w Urology Dr. Cochran     Other  medical issues include depresssion/anxiety/insomnia, hypertension, chronic diastolic CHF, and osteoporois for which she is receiving Prolia.     Advance Care Planning   Date: 10/16/2024  ACP Reviewed/No Changes  Voluntary advance care planning discussion had today with patient. Previously completed HCPOA and LW in electronic medical record is current, no changes made.    A total of 5 min was spent on advance care planning, goals of care discussion, emotional support, formulating and communicating prognosis and exploring burden/benefit of various approaches of treatment. This discussion occurred on a fully voluntary basis with the verbal consent of the patient and/or family.  (Has also reviewed ACP in detail w Palliative NP Randolph and Transplant Dr. Tubbs)      Trying to decide whether or not would want to proceed to HD if necessary  Pain significantly resolved following HELLEN last month  Has graduated from  and doing really well w outpatient PT    History of Present Illness    CHIEF COMPLAINT:  Ms. Damon presents today for follow up.    BACK PAIN AND PHYSICAL THERAPY:  She reports significant improvement in back pain following HELLEN injection, with increased ability to perform daily activities and enhanced mobility. She is engaging in physical therapy exercises and expresses satisfaction with her progress. She can now ascend and descend stairs while holding handrail, which was previously not possible. Her current level of discomfort is minor compared to her previous severe pain.     CARDIOVASCULAR:  She reports heart concerns noted by Dr. Tubbs. She has experienced elevated blood pressure with recent fluctuations observed at home. She also mentions occasional episodes of fast heart rate, which she can feel, but denies chest pain or palpitations.    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    ANEMIA:  She receives Epo injections at the cancer center, next injection scheduled for tomorrow  with cont hematology follow-up.    H/O BREAST CANCER:  She has follow-up scheduled with breast surgeon onc, Dr. Delgado.     EDEMA:  She reports improvement in leg swelling, which was previously significant. Discussed beneficial cross-taping technique used during therapy to help draw out fluid from her legs.    MEDICATIONS:  Her pregablin dosage has been decreased. She has started on sodium bicarbonate. She is currently on an anti-rejection medication at a level deemed appropriate by her transplant physician.    SURGICAL HISTORY:  Discussed interstim procedure which she indicates likely exacerbated her pain issues and does not seemed to have helped much with her urinary problems. Recent thoracic spine injection at the T11-T12 level however has provided significant pain relief.    VENTRAL HERNIA:  She reports awareness of a ventral hernia, stable, no need for surgical intervention at this time.    ADVANCED CARE PLANNING:  She confirms having a Healthcare Power of  and Living Will document from 2022.  Reviewed today - no changes.    SOCIAL SUPPORT:  Though family not nearby, she reports good social support, mentioning friends collectively purchasing a new recliner for her as a surprise gift.       PHQ-4 Score: 0     From LOV w me 9/13/24  Ms. Damon suffered CVA 7/19/24 with subsequent hospitalization then transfer to [facility] to address on-going dysarthria and R-sided weakness. She also has severe spinal stenosis.  Has appointment w external Cardiologist Dr. Jessica HARPER/STACY 9/17/24 to f/u on PFO newly discovered following above recent CVA    Hosp/ED/Urgent Care:  9/16/24 [facility] T[facility]T12 [place] [facility]    Recent appointments:   10/14/24 Palliative Randolph PFO/ESRD  10/09/24 Surg Misael ventral hernia  10/08/24 Transplant Tubbs  10/08/24 Nephro Kamyar CKD4/5    Urology Cotrell Interstim  Heme/Onc Burk CKD4/5  Pod Akunne  L foot def    External appointments  Cards Jessica  L[facility]/OLOL    Upcoming appointments:   Next 10 Appointments       Date Provider Specialty Appt Notes    10/17/2024 Kristine Delgado, NP Breast Surgery exam only    10/23/2024  Lab blossom    11/4/2024  Primary Care b/p check    11/4/2024 Génesis Gonzales, LCSW Primary Care LCSW Visit    11/13/2024 Mere Pineda, FNP-C Primary Care One month f/u    11/25/2024 Guicho Godinez MD Otolaryngology 3 month f/u sinus/Throat    12/12/2024  Lab     12/19/2024 Carter Crawford MD Nephrology EP/RTC/2M/LABS/IDS    1/16/2025  Lab Dr. Johnson    1/17/2025 Sami Cochran MD Urology 6 month f/u        The following were reviewed: Active problem list, medication list, allergies, family history, social history, and Health Maintenance.     Medications have been reviewed and reconciled with patient at visit today.    Exam: sat 96%  Vitals:    10/16/24 1019   BP: (!) 152/80   Pulse:    Temp:      Weight: 68.6 kg (151 lb 2 oz)   Body mass index is 27.64 kg/m².     BP Readings from Last 3 Encounters:   10/16/24 (!) 152/80   10/14/24 134/88   10/09/24 (!) 149/86      Wt Readings from Last 3 Encounters:   10/16/24 0948 68.6 kg (151 lb 2 oz)   10/14/24 0827 67 kg (147 lb 11.3 oz)   10/09/24 1058 68.8 kg (151 lb 10.8 oz)     Physical Exam  Vitals reviewed.   Constitutional:       General: She is not in acute distress.     Appearance: Normal appearance.   HENT:      Head: Normocephalic and atraumatic.      Right Ear: External ear normal.      Left Ear: External ear normal.      Nose: Nose normal.      Mouth/Throat:      Mouth: Mucous membranes are moist.      Pharynx: Oropharynx is clear.   Eyes:      General: No scleral icterus.     Extraocular Movements: Extraocular movements intact.      Conjunctiva/sclera: Conjunctivae normal.      Comments: glasses   Neck:      Vascular: No carotid bruit.   Cardiovascular:      Comments: Borderline tachycardia  Pulmonary:      Effort: Pulmonary effort is normal. No respiratory distress.    Abdominal:      General: Bowel sounds are normal.      Palpations: Abdomen is soft.      Tenderness: There is no abdominal tenderness. There is no guarding.      Hernia: A hernia is present.      Comments: Ventral hernia - soft, reducible   Musculoskeletal:      Comments: Trace LE edema   Lymphadenopathy:      Cervical: No cervical adenopathy.   Skin:     General: Skin is warm and dry.   Neurological:      Mental Status: She is alert and oriented to person, place, and time. Mental status is at baseline.   Psychiatric:         Mood and Affect: Mood normal.         Behavior: Behavior normal.         Thought Content: Thought content normal.     Laboratory Reviewed   Lab Results   Component Value Date    WBC 3.45 (L) 10/01/2024    HGB 8.1 (L) 10/01/2024    HCT 25.9 (L) 10/01/2024     10/01/2024    MCV 92 10/01/2024    CHOL 142 07/20/2024    TRIG 85 07/20/2024    HDL 44 07/20/2024    LDLCALC 81.0 07/20/2024    ALT 29 09/11/2024    AST 42 (H) 09/11/2024     10/01/2024    K 5.7 (H) 10/01/2024     10/01/2024    CREATININE 4.0 (H) 10/01/2024    BUN 54 (H) 10/01/2024    CO2 22 (L) 10/01/2024    MG 2.2 09/11/2024    TSH 5.822 (H) 07/19/2024    FREET4 0.94 07/19/2024    PSA <0.1 05/27/2008    INR 1.0 07/20/2024    HGBA1C 5.1 07/02/2024    CRP 14.4 (H) 08/24/2023     Lab Results   Component Value Date    .3 (H) 10/01/2024    CALCIUM 8.9 10/01/2024    CAION 1.13 09/05/2018    PHOS 4.3 10/01/2024      Lab Results   Component Value Date    SLZPPRIJ88 1373 (H) 06/20/2023     Lab Results   Component Value Date    FOLATE 20.0 06/20/2023      Lab Results   Component Value Date    IRON 83 08/30/2024    TRANSFERRIN 175 (L) 08/30/2024    TIBC 259 08/30/2024    FESATURATED 32 08/30/2024      Lab Results   Component Value Date    EGFRNORACEVR 11.5 (A) 10/01/2024    ALBUMIN 3.3 (L) 10/01/2024     (H) 09/11/2024     Lab Results   Component Value Date    PWHMKWUQ58WV 39 07/02/2024       Assessment:   69 y.o.  female with multiple co-morbid illnesses here for continued follow up of medical problems.      The primary encounter diagnosis was Spinal stenosis of lumbosacral region. Diagnoses of Heart transplanted, Patent foramen ovale, Essential hypertension, Anemia associated with stage 4 chronic renal failure, DDD (degenerative disc disease), thoracolumbar, Angina of effort, Rapid palpitations, and Need for vaccination were also pertinent to this visit.      Plan:   1. Spinal stenosis of lumbosacral region    2. Heart transplanted  Overview:  5/93       3. Patent foramen ovale  Overview:  Echo w bubble study 7/20/24     Left Ventricle: The left ventricle is normal in size. Normal wall thickness. There is normal systolic function with a visually estimated ejection fraction of 55 - 60%. There is normal diastolic function.    Right Ventricle: Normal right ventricular cavity size. Wall thickness is normal. Systolic function is normal.    Left Atrium: Patent foramen ovale visualized with predominant right to left shunting indicated by saline contrast.    Tricuspid Valve: There is mild regurgitation.    IVC/SVC: Normal venous pressure at 3 mmHg.    The patient is status post cardiac transplantation.      4. Essential hypertension    5. Anemia associated with stage 4 chronic renal failure    6. DDD (degenerative disc disease), thoracolumbar  Overview:  Advanced, seen on CT 2023.      7. Angina of effort  -     IN OFFICE EKG 12-LEAD (to Muse)    8. Rapid palpitations  -     IN OFFICE EKG 12-LEAD (to Muse)    9. Need for vaccination  -     Influenza - Trivalent (Adjuvanted)         Health Maintenance         Date Due Completion Date    RSV Vaccine (Age 60+ and Pregnant patients) (1 - Risk 60-74 years 1-dose series) Never done ---    COVID-19 Vaccine (7 - 2024-25 season) 09/01/2024 4/27/2023    Mammogram 04/18/2025 4/18/2024    Lipid Panel 07/20/2025 7/20/2024    Colorectal Cancer Screening 02/25/2026 2/25/2021    Hemoglobin A1c  (Diabetic Prevention Screening) 07/02/2027 7/2/2024    DEXA Scan 07/03/2027 7/3/2024    TETANUS VACCINE 09/09/2030 9/9/2020            -Patient's lab results were reviewed and discussed with patient  -Treatment options and alternatives were discussed with the patient. Patient expressed understanding. Patient was given the opportunity to ask questions and be an active participant in their medical care. Patient had no further questions or concerns at this time.     Follow up: Follow up in about 4 weeks (around 11/13/2024) for please schedule 4 week NP Edwin and 8 weeks w me.    After visit summary printed and given to patient upon discharge.  Patient care plan included in After visit summary.    TOTAL TIME evaluating and managing this patient for this encounter was 63 minutes. This time was spent personally by me on some of the following activities: review of patient's past medical history, assessing age-appropriate health maintenance needs, review of any interval history, review and interpretation of lab results, review and interpretation of imaging test results, review and interpretation of cardiology test results, reviewing consulting specialist notes, obtaining history from the patient and family, examination of the patient, medication reconciliation, managing and/or ordering prescription medications, ordering imaging tests, ordering referral to subspecialty provider(s), educating patient and answering their questions about diagnosis, treatment plan, and goals of treatment, discussing planned follow-up and final documentation of the visit. This time was exclusive of any separately billable procedures for this patient and exclusive of time spent treating any other patients.     This note was generated with the assistance of ambient listening technology. Verbal consent was obtained by the patient and accompanying visitor(s) for the recording of patient appointment to facilitate this note. I attest to having  reviewed and edited the generated note for accuracy, though some syntax or spelling errors may persist. Please contact the author of this note for any clarification.

## 2024-10-16 NOTE — PROGRESS NOTES
Two patient identifiers verified.  Allergies and medication reviewed. Patient tolerated injection well; no redness, bleeding, or bruising noted to injection site.  Patient instructed to remain in clinic setting for 15 minutes.  Verbalizes understanding.

## 2024-10-17 ENCOUNTER — OFFICE VISIT (OUTPATIENT)
Dept: SURGERY | Facility: CLINIC | Age: 69
End: 2024-10-17
Payer: MEDICARE

## 2024-10-17 VITALS — WEIGHT: 146.38 LBS | BODY MASS INDEX: 26.77 KG/M2

## 2024-10-17 DIAGNOSIS — Z71.89 COUNSELING ON HEALTH PROMOTION AND DISEASE PREVENTION: ICD-10-CM

## 2024-10-17 DIAGNOSIS — D63.1 ANEMIA ASSOCIATED WITH STAGE 4 CHRONIC RENAL FAILURE: ICD-10-CM

## 2024-10-17 DIAGNOSIS — C77.3 SECONDARY AND UNSPECIFIED MALIGNANT NEOPLASM OF AXILLA AND UPPER LIMB LYMPH NODES: ICD-10-CM

## 2024-10-17 DIAGNOSIS — Z12.39 ENCOUNTER FOR SCREENING BREAST EXAMINATION: ICD-10-CM

## 2024-10-17 DIAGNOSIS — Z85.3 ENCOUNTER FOR FOLLOW-UP SURVEILLANCE OF BREAST CANCER: ICD-10-CM

## 2024-10-17 DIAGNOSIS — N18.4 CKD (CHRONIC KIDNEY DISEASE) STAGE 4, GFR 15-29 ML/MIN: Primary | ICD-10-CM

## 2024-10-17 DIAGNOSIS — Z08 ENCOUNTER FOR FOLLOW-UP SURVEILLANCE OF BREAST CANCER: ICD-10-CM

## 2024-10-17 DIAGNOSIS — Z71.89 COUNSELING AND COORDINATION OF CARE: ICD-10-CM

## 2024-10-17 DIAGNOSIS — D05.12 DUCTAL CARCINOMA IN SITU (DCIS) OF LEFT BREAST: Primary | ICD-10-CM

## 2024-10-17 DIAGNOSIS — N18.4 ANEMIA ASSOCIATED WITH STAGE 4 CHRONIC RENAL FAILURE: ICD-10-CM

## 2024-10-17 PROCEDURE — 99999 PR PBB SHADOW E&M-EST. PATIENT-LVL III: CPT | Mod: PBBFAC,HCNC,, | Performed by: NURSE PRACTITIONER

## 2024-10-18 ENCOUNTER — TELEPHONE (OUTPATIENT)
Dept: TRANSPLANT | Facility: CLINIC | Age: 69
End: 2024-10-18
Payer: MEDICARE

## 2024-10-21 ENCOUNTER — TELEPHONE (OUTPATIENT)
Dept: NEPHROLOGY | Facility: CLINIC | Age: 69
End: 2024-10-21
Payer: MEDICARE

## 2024-10-21 NOTE — TELEPHONE ENCOUNTER
----- Message from Pop sent at 10/21/2024  4:26 PM CDT -----  Contact: Nadia  Type:  Patient Requesting a call back     Who Called:Nadia  What is the call back request regarding?:Requesting a call back to verify of she needs to do her labs tomorrow, because she will not see Dr. Crawford until December and has labs scheduled before that appointment   Would the patient rather a call back or a response via [facility]?call  Best Call Back Number:[phone]   Additional Information:

## 2024-10-21 NOTE — TELEPHONE ENCOUNTER
Spoke to patient regarding her labs scheduled for tomorrow, explained to patient that outside of  labs she also has linked labs from another provider to complete. Patient stated she understood she still had to complete labs for the other provider and agreed to go get them tomorrow.

## 2024-10-22 ENCOUNTER — TELEPHONE (OUTPATIENT)
Dept: HEMATOLOGY/ONCOLOGY | Facility: CLINIC | Age: 69
End: 2024-10-22
Payer: MEDICARE

## 2024-10-22 ENCOUNTER — LAB VISIT (OUTPATIENT)
Dept: LAB | Facility: HOSPITAL | Age: 69
End: 2024-10-22
Attending: INTERNAL MEDICINE
Payer: MEDICARE

## 2024-10-22 DIAGNOSIS — N18.4 CKD (CHRONIC KIDNEY DISEASE) STAGE 4, GFR 15-29 ML/MIN: ICD-10-CM

## 2024-10-22 DIAGNOSIS — N18.4 ANEMIA ASSOCIATED WITH STAGE 4 CHRONIC RENAL FAILURE: ICD-10-CM

## 2024-10-22 DIAGNOSIS — D63.1 ANEMIA ASSOCIATED WITH STAGE 4 CHRONIC RENAL FAILURE: ICD-10-CM

## 2024-10-22 LAB
ALBUMIN SERPL BCP-MCNC: 3.2 G/DL (ref 3.5–5.2)
ALP SERPL-CCNC: 142 U/L (ref 40–150)
ALT SERPL W/O P-5'-P-CCNC: 25 U/L (ref 10–44)
ANION GAP SERPL CALC-SCNC: 11 MMOL/L (ref 8–16)
ANISOCYTOSIS BLD QL SMEAR: SLIGHT
AST SERPL-CCNC: 33 U/L (ref 10–40)
BASOPHILS # BLD AUTO: 0.01 K/UL (ref 0–0.2)
BASOPHILS NFR BLD: 0.4 % (ref 0–1.9)
BILIRUB SERPL-MCNC: 0.3 MG/DL (ref 0.1–1)
BUN SERPL-MCNC: 52 MG/DL (ref 8–23)
CALCIUM SERPL-MCNC: 9.5 MG/DL (ref 8.7–10.5)
CHLORIDE SERPL-SCNC: 107 MMOL/L (ref 95–110)
CO2 SERPL-SCNC: 25 MMOL/L (ref 23–29)
CREAT SERPL-MCNC: 3.8 MG/DL (ref 0.5–1.4)
DIFFERENTIAL METHOD BLD: ABNORMAL
EOSINOPHIL # BLD AUTO: 0.2 K/UL (ref 0–0.5)
EOSINOPHIL NFR BLD: 7.4 % (ref 0–8)
ERYTHROCYTE [DISTWIDTH] IN BLOOD BY AUTOMATED COUNT: 14 % (ref 11.5–14.5)
EST. GFR  (NO RACE VARIABLE): 12 ML/MIN/1.73 M^2
FERRITIN SERPL-MCNC: 81 NG/ML (ref 20–300)
GLUCOSE SERPL-MCNC: 92 MG/DL (ref 70–110)
HCT VFR BLD AUTO: 29.2 % (ref 37–48.5)
HGB BLD-MCNC: 9.4 G/DL (ref 12–16)
HYPOCHROMIA BLD QL SMEAR: ABNORMAL
IMM GRANULOCYTES # BLD AUTO: 0.01 K/UL (ref 0–0.04)
IMM GRANULOCYTES NFR BLD AUTO: 0.4 % (ref 0–0.5)
IRON SERPL-MCNC: 80 UG/DL (ref 30–160)
LYMPHOCYTES # BLD AUTO: 1 K/UL (ref 1–4.8)
LYMPHOCYTES NFR BLD: 35.7 % (ref 18–48)
MCH RBC QN AUTO: 29 PG (ref 27–31)
MCHC RBC AUTO-ENTMCNC: 32.2 G/DL (ref 32–36)
MCV RBC AUTO: 90 FL (ref 82–98)
MONOCYTES # BLD AUTO: 0.4 K/UL (ref 0.3–1)
MONOCYTES NFR BLD: 14.3 % (ref 4–15)
NEUTROPHILS # BLD AUTO: 1.1 K/UL (ref 1.8–7.7)
NEUTROPHILS NFR BLD: 41.8 % (ref 38–73)
NRBC BLD-RTO: 0 /100 WBC
PLATELET # BLD AUTO: 230 K/UL (ref 150–450)
PLATELET BLD QL SMEAR: ABNORMAL
PMV BLD AUTO: 9.5 FL (ref 9.2–12.9)
POIKILOCYTOSIS BLD QL SMEAR: SLIGHT
POLYCHROMASIA BLD QL SMEAR: ABNORMAL
POTASSIUM SERPL-SCNC: 4.6 MMOL/L (ref 3.5–5.1)
PROT SERPL-MCNC: 7.8 G/DL (ref 6–8.4)
RBC # BLD AUTO: 3.24 M/UL (ref 4–5.4)
SATURATED IRON: 24 % (ref 20–50)
SODIUM SERPL-SCNC: 143 MMOL/L (ref 136–145)
TOTAL IRON BINDING CAPACITY: 337 UG/DL (ref 250–450)
TRANSFERRIN SERPL-MCNC: 228 MG/DL (ref 200–375)
WBC # BLD AUTO: 2.72 K/UL (ref 3.9–12.7)

## 2024-10-22 PROCEDURE — 84466 ASSAY OF TRANSFERRIN: CPT | Mod: HCNC

## 2024-10-22 PROCEDURE — 83540 ASSAY OF IRON: CPT | Mod: HCNC

## 2024-10-22 PROCEDURE — 80053 COMPREHEN METABOLIC PANEL: CPT | Mod: HCNC

## 2024-10-22 PROCEDURE — 36415 COLL VENOUS BLD VENIPUNCTURE: CPT | Mod: HCNC

## 2024-10-22 PROCEDURE — 82728 ASSAY OF FERRITIN: CPT | Mod: HCNC

## 2024-10-22 NOTE — TELEPHONE ENCOUNTER
----- Message from Kelsie Burk PA-C sent at 10/22/2024 10:54 AM CDT -----  Can we get her an appt with possible epo injection same day. With any of the APPs. She has been lost to follow up.

## 2024-10-25 ENCOUNTER — OFFICE VISIT (OUTPATIENT)
Dept: HEMATOLOGY/ONCOLOGY | Facility: CLINIC | Age: 69
End: 2024-10-25
Payer: MEDICARE

## 2024-10-25 ENCOUNTER — INFUSION (OUTPATIENT)
Dept: INFUSION THERAPY | Facility: HOSPITAL | Age: 69
End: 2024-10-25
Attending: INTERNAL MEDICINE
Payer: MEDICARE

## 2024-10-25 VITALS
HEART RATE: 93 BPM | DIASTOLIC BLOOD PRESSURE: 84 MMHG | SYSTOLIC BLOOD PRESSURE: 145 MMHG | HEIGHT: 62 IN | WEIGHT: 148.69 LBS | BODY MASS INDEX: 27.36 KG/M2 | TEMPERATURE: 98 F | OXYGEN SATURATION: 99 %

## 2024-10-25 DIAGNOSIS — D63.1 ANEMIA ASSOCIATED WITH STAGE 4 CHRONIC RENAL FAILURE: Primary | Chronic | ICD-10-CM

## 2024-10-25 DIAGNOSIS — M81.8 OTHER OSTEOPOROSIS WITHOUT CURRENT PATHOLOGICAL FRACTURE: Primary | ICD-10-CM

## 2024-10-25 DIAGNOSIS — D05.12 DUCTAL CARCINOMA IN SITU (DCIS) OF LEFT BREAST: ICD-10-CM

## 2024-10-25 DIAGNOSIS — N18.4 ANEMIA ASSOCIATED WITH STAGE 4 CHRONIC RENAL FAILURE: ICD-10-CM

## 2024-10-25 DIAGNOSIS — N18.4 ANEMIA ASSOCIATED WITH STAGE 4 CHRONIC RENAL FAILURE: Primary | Chronic | ICD-10-CM

## 2024-10-25 DIAGNOSIS — D63.1 ANEMIA ASSOCIATED WITH STAGE 4 CHRONIC RENAL FAILURE: ICD-10-CM

## 2024-10-25 PROCEDURE — 63600175 PHARM REV CODE 636 W HCPCS: Mod: JZ,EC,JG,HCNC | Performed by: NURSE PRACTITIONER

## 2024-10-25 PROCEDURE — 99999 PR PBB SHADOW E&M-EST. PATIENT-LVL IV: CPT | Mod: PBBFAC,HCNC,, | Performed by: NURSE PRACTITIONER

## 2024-10-25 PROCEDURE — 96372 THER/PROPH/DIAG INJ SC/IM: CPT | Mod: HCNC

## 2024-10-25 RX ADMIN — EPOETIN ALFA-EPBX 20000 UNITS: 20000 INJECTION, SOLUTION INTRAVENOUS; SUBCUTANEOUS at 03:10

## 2024-10-25 NOTE — PROGRESS NOTES
Subjective:       Patient ID: Nadia Damon is a 69 y.o. female.    Chief Complaint: review labs. Consideration of Retacrit    HPI: 69 y.o female with h/o of heart transplant in  (on anti-rejection medication), CKD V, triple negative breast ca with lymph node involovement. Initiation of chemotherapy 2021 (Taxotere/Cytoxan) with Udenyca 2021. Unfortunately chemotherapy was complicated by pancytopenia and neutropenic fever resulting in hospitalization x 2. Patient decided on no further chemotherapy. S/p radiation completed 2022    10/31/2022 underwent right breast biopsy due to abnormal findings on mammogram with benign pathology. She continues follow up with breast cancer survivorship     Today she is here for lab review and consideration for EPO therapy for anemia of CKD. She feels well overall. Interval CVA 2024. Completed rehab continues PT.     Social History     Socioeconomic History    Marital status: Single    Number of children: 2    Highest education level: 11th grade   Occupational History    Occupation: Retired   Tobacco Use    Smoking status: Never     Passive exposure: Never    Smokeless tobacco: Never   Substance and Sexual Activity    Alcohol use: Never     Alcohol/week: 0.0 standard drinks of alcohol    Drug use: No    Sexual activity: Not Currently     Partners: Male     Birth control/protection: See Surgical Hx   Other Topics Concern    Are you pregnant or think you may be? No    Breast-feeding No   Social History Narrative    Single. 2 children , 1  at 31 yoa  2014 strep throat -  pneumonia and renal complications after not completing course of AB. Other child lives in [place], [place]. Has a cousin locally that could help in an emergency. 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 Caffeine intake =- 1 cola a day. No coffee, + occasional tea, avoids caffeine especially at night. Still drives. She does not have a Living Will or Advanced  directive.      Social Drivers of Health     Financial Resource Strain: Low Risk  (8/27/2024)    Overall Financial Resource Strain (CARDIA)     Difficulty of Paying Living Expenses: Not hard at all   Food Insecurity: No Food Insecurity (8/27/2024)    Hunger Vital Sign     Worried About Running Out of Food in the Last Year: Never true     Ran Out of Food in the Last Year: Never true   Transportation Needs: No Transportation Needs (8/27/2024)    TRANSPORTATION NEEDS     Transportation : No   Physical Activity: Inactive (8/27/2024)    Exercise Vital Sign     Days of Exercise per Week: 0 days     Minutes of Exercise per Session: 0 min   Stress: No Stress Concern Present (8/27/2024)    [employer] - Occupational Stress Questionnaire     Feeling of Stress : Not at all   Housing Stability: Low Risk  (8/27/2024)    Housing Stability Vital Sign     Unable to Pay for Housing in the Last Year: No     Homeless in the Last Year: No       Past Medical History:   Diagnosis Date    Abdominal wall hernia     CT Renal 6/11/2018---Small fat containing superior ventral abdominal wall hernia at the epicardial pacing lead site.    Abnormal mammogram 10/12/2021    Acute cystitis without hematuria 05/10/2022    Acute ischemic right middle cerebral artery (MCA) stroke 07/20/2024    GRACE (acute kidney injury) 11/22/2021    Anxiety     Aphasia 07/19/2024    Arthritis     ZEN HIPS    Breast cancer in female 08/2021    LEFT BREAST    Bronchitis 08/18/2016    Never smoked      C. difficile colitis 11/29/2021    Cellulitis of axilla, left 12/23/2021    Chronic diastolic heart failure 12/16/2021    Chronic kidney disease     stage 4, GFR 15-29 ml/min    Chronic midline low back pain without sciatica 06/18/2018    Closed nondisplaced fracture of distal phalanx of left great toe with routine healing 10/22/2018    Coronary artery disease 1993    heart transplant    COVID-19 in immunocompromised patient 02/26/2024     Cystitis 05/10/2022    Depression     Encounter for blood transfusion     Encounter for palliative care 10/14/2024    Fibromyalgia     on Lyrica    Heart failure     native heart cardiomyopathy    Heart transplanted 1993    due to cardiomyopathy    History of hyperparathyroidism; Hyperparathyroidism, secondary renal     PT DENIES    Hypertension     Immune disorder     anti rejection meds    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    Impaired mobility 07/28/2022    Iron deficiency anemia 08/15/2017    Kidney stones     passed per pt    Obesity     Obesity (BMI 30.0-34.9) 07/22/2019    Other osteoporosis without current pathological fracture 08/30/2019    Other pancytopenia 05/06/2024    Parotitis, acute 09/19/2023    Pharyngitis 01/09/2019    Pneumonia due to infectious organism 03/14/2024    PONV (postoperative nausea and vomiting)     Severe sepsis 11/22/2021    Shingles 2003 approx    left leg    Subclinical hypothyroidism 06/16/2023    Thrombocytopenia, unspecified 11/29/2021    Trouble in sleeping     Urinary incontinence        Family History   Problem Relation Name Age of Onset    Cancer Mother  38        breast    Breast cancer Mother      Breast cancer Maternal Grandmother      Heart disease Maternal Grandmother      Hypertension Son      Cataracts Cousin      Diabetes Neg Hx      Stroke Neg Hx      Kidney disease Neg Hx      Asthma Neg Hx      COPD Neg Hx      Melanoma Neg Hx      Hyperlipidemia Neg Hx         Past Surgical History:   Procedure Laterality Date    bladder implant Right 06/11/2024    BLADDER SURGERY  2015 approx    mesh - Dr Everett then 2nd reconstructive sx Dr Onofre    BREAST BIOPSY Bilateral     NEGATIVE    BREAST BIOPSY Right 10/31/2022    benign    BREAST LUMPECTOMY Left 2021    BREAST SURGERY Left 09/28/2015    Bx - benign    BREAST SURGERY Right 12/2015    Bx benign    CARDIAC PACEMAKER REMOVAL Left 06/26/2014    Pacer defirillator removed. Put in 1993 aat time  of heart transplant    CARPAL TUNNEL RELEASE Left 03/03/2015    Dr. Hall    COLONOSCOPY N/A 02/25/2021    Procedure: COLONOSCOPY;  Surgeon: Freida Ramirez MD;  Location: [facility] ENDO;  Service: Endoscopy;  Laterality: N/A;    CYSTOCELE REPAIR      Twice with mesh removal    EPIDURAL STEROID INJECTION INTO CERVICAL SPINE N/A 02/02/2023    Procedure: T11/T12 IL HELLEN;  Surgeon: Jassi Pierre MD;  Location: [facility] PAIN [facility]GT;  Service: Pain Management;  Laterality: N/A;    EPIDURAL STEROID INJECTION INTO CERVICAL SPINE N/A 9/16/2024    Procedure: T11/T12 IL HELLEN;  Surgeon: Jassi Pierre MD;  Location: [facility] PAIN MGT;  Service: Pain Management;  Laterality: N/A;    HEART TRANSPLANT  1993    HERNIA REPAIR Right 1971 approx    Inguinal    HYSTERECTOMY  1983    vag hyst /LSO     IMPLANTATION OF PERMANENT SACRAL NERVE STIMULATOR N/A 06/11/2024    Procedure: INSERTION, NEUROSTIMULATOR, PERMANENT, SACRAL;  Surgeon: Sami Cochran MD;  Location: [facility] OR;  Service: Urology;  Laterality: N/A;    INCISION AND DRAINAGE OF ABSCESS Left 12/24/2021    Procedure: INCISION AND DRAINAGE, ABSCESS;  Surgeon: Joseph Longo MD;  Location: [facility] OR;  Service: General;  Laterality: Left;    INJECTION OF ANESTHETIC AGENT AROUND MEDIAL BRANCH NERVES INNERVATING LUMBAR FACET JOINT Right 10/19/2022    Procedure: Right L4/L5 and L5/S1 MBB;  Surgeon: Jassi Pierre MD;  Location: [facility] PAIN MGT;  Service: Pain Management;  Laterality: Right;    INJECTION OF ANESTHETIC AGENT AROUND MEDIAL BRANCH NERVES INNERVATING LUMBAR FACET JOINT Right 11/09/2022    Procedure: Right L4/L5 and L5/S1 MBB;  Surgeon: Jassi Pierre MD;  [facility];  Service: Pain Management;  Laterality: Right;    INJECTION OF ANESTHETIC AGENT INTO SACROILIAC JOINT Right 08/22/2022    Procedure: Right SIJ Injection Right L5/S1 Facte Injection;  Surgeon: Jassi Pierre MD;  Location: [facility]GT;  Service: Pain Management;  Laterality: Right;     INSERTION OF TUNNELED CENTRAL VENOUS CATHETER (CVC) WITH SUBCUTANEOUS PORT N/A 11/09/2021    Procedure: [id number];  Surgeon: Christoph Douglas MD;  Location: [facility] OR;  Service: General;  Laterality: N/A;    RADIOFREQUENCY THERMOCOAGULATION Right 12/07/2022    Procedure: Right L4/L5 and L5/S1 Lumbar RFA;  Surgeon: Jassi Pierre MD;  Location: [facility] PAIN MGT;  Service: Pain Management;  Laterality: Right;    REMOVAL OF VASCULAR ACCESS PORT      ROBOT-ASSISTED LAPAROSCOPIC ABDOMINAL SACROCOLPOPEXY N/A 08/10/2023    Procedure: ROBOTIC SACROCOLPOPEXY, ABDOMEN;  Surgeon: PRANAY Villalobos MD;  Location: [facility] OR;  Service: OB/GYN;  Laterality: N/A;    ROBOT-ASSISTED LAPAROSCOPIC OOPHORECTOMY Right 08/10/2023    Procedure: ROBOTIC OOPHORECTOMY;  Surgeon: PRANYA Villalobos MD;  Location: [facility] OR;  Service: OB/GYN;  Laterality: Right;    SENTINEL LYMPH NODE BIOPSY Left 10/12/2021    Procedure: BIOPSY, LYMPH NODE, SENTINEL;  Surgeon: Christoph Douglas MD;  Location: [facility] OR;  Service: General;  Laterality: Left;    TOE SURGERY      XI ROBOTIC URETHROPEXY N/A 08/10/2023    Procedure: XI ROBOTIC URETHROPEXY;  Surgeon: PRANAY Villalobos MD;  Location: [facility] OR;  Service: OB/GYN;  Laterality: N/A;       Review of Systems   Constitutional:  Negative for activity change, appetite change, chills, fatigue, fever and unexpected weight change.   HENT:  Negative for congestion, mouth sores, nosebleeds, sore throat, trouble swallowing and voice change.    Eyes:  Negative for visual disturbance.   Respiratory:  Negative for cough, chest tightness, shortness of breath and wheezing.    Cardiovascular:  Negative for chest pain, palpitations and leg swelling.   Gastrointestinal:  Negative for abdominal distention, abdominal pain, anal bleeding, blood in stool, constipation, diarrhea, nausea and vomiting.   Genitourinary:  Negative for difficulty urinating, dysuria and hematuria.   Musculoskeletal:  Positive for back pain (sees  pain management). Negative for arthralgias and myalgias. Gait problem: madeleine garcia, s/p CVA 7/2024.       Breast pain s/p mammogram   Skin:  Negative for pallor, rash and wound.   Neurological:  Positive for weakness. Negative for dizziness, syncope and headaches.   Hematological:  Negative for adenopathy. Does not bruise/bleed easily.   Psychiatric/Behavioral:  The patient is nervous/anxious.        Medication List with Changes/Refills   Current Medications    ASPIRIN (ECOTRIN) 81 MG EC TABLET    Take 1 tablet (81 mg total) by mouth once daily.    CALCITRIOL (ROCALTROL) 0.25 MCG CAP    Take 1 capsule (0.25 mcg total) by mouth once daily.    CARVEDILOL (COREG) 3.125 MG TABLET    Take 1 tablet (3.125 mg total) by mouth 2 (two) times daily with meals. Hold parameters: SBP less than 110 and/or DBP less than 75    CYCLOSPORINE MODIFIED, NEORAL, (NEORAL) 25 MG CAPSULE    Take 3 capsules (75 mg total) by mouth 2 (two) times daily.    FUROSEMIDE (LASIX) 20 MG TABLET    Take 2 tablets (40 mg total) by mouth once daily. HOLD UNTIL FOLLOW UP WITH PCP    HYDRALAZINE (APRESOLINE) 25 MG TABLET    Take 25 mg by mouth every 8 (eight) hours. HOLD hydralazine for now; may restart if BP is elevated    ONDANSETRON (ZOFRAN) 4 MG TABLET    Take 4 mg by mouth as needed for Nausea.    PANTOPRAZOLE (PROTONIX) 20 MG TABLET    TAKE 1 TABLET ONE TIME DAILY    POLYETHYLENE GLYCOL (GLYCOLAX) 17 GRAM PWPK    Take 17 g by mouth once daily.    PREGABALIN (LYRICA) 25 MG CAPSULE    Take 1 capsule (25 mg total) by mouth every evening.    SODIUM BICARBONATE 650 MG TABLET    Take 1 tablet (650 mg total) by mouth 2 (two) times daily.    TEMAZEPAM (RESTORIL) 30 MG CAPSULE    Take 1 capsule (30 mg total) by mouth nightly as needed for Insomnia. FOR INSOMNIA    TIZANIDINE 4 MG CAP    Take 2 mg by mouth nightly as needed (muscle spasm).    VITAMIN E 400 UNIT CAPSULE    Take 400 Units by mouth once daily.    ZOLPIDEM (AMBIEN) 5 MG TAB    Take 1 tablet (5  mg total) by mouth every evening.     Objective:     Vitals:    10/25/24 1503   BP: (!) 145/84   Pulse: 93   Temp: 97.8 °F (36.6 °C)     Lab Results   Component Value Date    WBC 2.72 (L) 10/22/2024    HGB 9.4 (L) 10/22/2024    HCT 29.2 (L) 10/22/2024    MCV 90 10/22/2024     10/22/2024       BMP  Lab Results   Component Value Date     10/22/2024    K 4.6 10/22/2024     10/22/2024    CO2 25 10/22/2024    BUN 52 (H) 10/22/2024    CREATININE 3.8 (H) 10/22/2024    CALCIUM 9.5 10/22/2024    ANIONGAP 11 10/22/2024    ESTGFRAFRICA 19 (A) 07/14/2022    EGFRNONAA 17 (A) 07/14/2022     Lab Results   Component Value Date    ALT 25 10/22/2024    AST 33 10/22/2024    ALKPHOS 142 10/22/2024    BILITOT 0.3 10/22/2024     Lab Results   Component Value Date    IRON 80 10/22/2024    TIBC 337 10/22/2024    FERRITIN 81 10/22/2024       Physical Exam  Vitals reviewed.   Constitutional:       Appearance: She is well-developed.   HENT:      Head: Normocephalic.      Right Ear: External ear normal.      Left Ear: External ear normal.   Eyes:      General: Lids are normal. No scleral icterus.        Right eye: No discharge.         Left eye: No discharge.      Conjunctiva/sclera: Conjunctivae normal.   Neck:      Thyroid: No thyroid mass.   Cardiovascular:      Rate and Rhythm: Normal rate and regular rhythm.      Heart sounds: Normal heart sounds.   Pulmonary:      Effort: Pulmonary effort is normal. No respiratory distress.      Breath sounds: Normal breath sounds. No wheezing or rales.   Abdominal:      General: Bowel sounds are normal. There is no distension.      Palpations: Abdomen is soft.   Genitourinary:     Comments: deferred  Musculoskeletal:         General: Normal range of motion.      Cervical back: Normal range of motion.   Skin:     General: Skin is warm and dry.   Neurological:      Mental Status: She is alert and oriented to person, place, and time.   Psychiatric:         Speech: Speech normal.          Behavior: Behavior normal. Behavior is cooperative.         Thought Content: Thought content normal.        Assessment:     Problem List Items Addressed This Visit          Oncology    Anemia associated with stage 4 chronic renal failure (Chronic)     Hg 9.4. Iron levels adequate    Proceed with Retacrit today. Repeat in 2 weeks. F/u 2 months with repeat labs for consideration of Retacrit if hg <10         Ductal carcinoma in situ (DCIS) of left breast - Primary     Continue f/u with breast cancer survivorship              Plan:     Ductal carcinoma in situ (DCIS) of left breast    Anemia associated with stage 4 chronic renal failure      Route Chart for Scheduling    Med Onc Chart Routing      Follow up with physician    Follow up with LIA 2 months. Kelsie   Infusion scheduling note    Injection scheduling note retacrit in 2 weeks. possible retacrit 2 months   Labs CBC, ferritin, iron and TIBC and CMP   Scheduling:  Preferred lab:  Lab interval:  1-2 days prior   Imaging None      Pharmacy appointment No pharmacy appointment needed      Other referrals       No additional referrals needed             Therapy Plan Information  INF EPOETIN TRISTAN (RETACRIT) INITIAL DOSES for Other osteoporosis without current pathological fracture, noted on 8/30/2019  INF EPOETIN TRISTAN (RETACRIT) INITIAL DOSES for Anemia associated with stage 4 chronic renal failure, noted on 1/20/2022  epoetin tristan-epbx injection 20,000 Units  20,000 Units, Subcutaneous, PRN      No therapy plan of the specified type found.    No therapy plan of the specified type found.          ABELARDO العراقي

## 2024-10-25 NOTE — NURSING
Injection given without difficulties to R posterior arm , Band aid applied. Patient instructed to stay in the clinic for 15 minutes. Patient verbalized understanding and will notify nurse with any complaints.

## 2024-10-25 NOTE — ASSESSMENT & PLAN NOTE
Hg 9.4. Iron levels adequate    Proceed with Retacrit today. Repeat in 2 weeks. F/u 2 months with repeat labs for consideration of Retacrit if hg <10

## 2024-10-28 ENCOUNTER — TELEPHONE (OUTPATIENT)
Dept: TRANSPLANT | Facility: CLINIC | Age: 69
End: 2024-10-28
Payer: MEDICARE

## 2024-10-28 ENCOUNTER — TELEPHONE (OUTPATIENT)
Dept: CARDIOLOGY | Facility: HOSPITAL | Age: 69
End: 2024-10-28
Payer: MEDICARE

## 2024-10-29 RX ORDER — SODIUM BICARBONATE 650 MG/1
650 TABLET ORAL 2 TIMES DAILY
Qty: 60 TABLET | Refills: 1 | Status: SHIPPED | OUTPATIENT
Start: 2024-10-29

## 2024-11-04 ENCOUNTER — CLINICAL SUPPORT (OUTPATIENT)
Dept: PRIMARY CARE CLINIC | Facility: CLINIC | Age: 69
End: 2024-11-04
Payer: MEDICARE

## 2024-11-04 VITALS
BODY MASS INDEX: 27.06 KG/M2 | HEART RATE: 84 BPM | DIASTOLIC BLOOD PRESSURE: 87 MMHG | RESPIRATION RATE: 20 BRPM | TEMPERATURE: 98 F | SYSTOLIC BLOOD PRESSURE: 163 MMHG | OXYGEN SATURATION: 99 % | WEIGHT: 147.94 LBS

## 2024-11-04 DIAGNOSIS — F41.9 ANXIETY: ICD-10-CM

## 2024-11-04 DIAGNOSIS — M48.07 SPINAL STENOSIS OF LUMBOSACRAL REGION: ICD-10-CM

## 2024-11-04 DIAGNOSIS — F51.01 PRIMARY INSOMNIA: ICD-10-CM

## 2024-11-04 DIAGNOSIS — Z01.30 BLOOD PRESSURE CHECK: Primary | ICD-10-CM

## 2024-11-04 PROCEDURE — 99499 UNLISTED E&M SERVICE: CPT | Mod: HCNC,S$GLB,,

## 2024-11-04 PROCEDURE — 99999 PR PBB SHADOW E&M-EST. PATIENT-LVL I: CPT | Mod: PBBFAC,HCNC,,

## 2024-11-04 PROCEDURE — 99999 PR PBB SHADOW E&M-EST. PATIENT-LVL II: CPT | Mod: PBBFAC,HCNC,,

## 2024-11-04 NOTE — PROGRESS NOTES
[facility] INTAKE    DATE:  11/4/2024  REFERRAL SOURCE:  Elis Wick MD  TYPE OF VISIT:  In person  LENGTH OF SESSION: 90  .  HISTORY OF PRESENTING ILLNESS:  Nadia Damon, a 69 y.o. 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       CHIEF COMPLAINT/REASON FOR ENCOUNTER: Pt presented for initial assessment. Pt's chief complaint includes the following:  sleep, and grief.    PSYCHIATRIC HISTORY:  Patient does not currently have a psychiatrist.    Patient does not currently have a therapist.     Pt is taking zolpidem (Ambien) 0.5mg once daily for sleep.  They are not interested in medication changes.  Previous Psychiatric Hospitalizations:  No  History of Trauma:  Heart transplant; CVA.    History of Violence:  No  Access to a gun/weapon:  No  Previous Suicide Attempts:  No    Risk assessment:  Patient reports no suicidal ideation  Patient reports no homicidal ideation  Patient reports no self-injurious behavior  Patient reports no violent behavior    PSYCHIATRIC FAMILY HISTORY:  Parents were alcoholic; SonMoy is an alcoholic.  Grandson has hx of depression    SUBSTANCE ABUSE HISTORY:  Tobacco:  No   Alcohol: none  Illicit Substances: No  Misuse of Prescription Medications:  No    MEDICAL HISTORY:  Past Medical History:   Diagnosis Date    Abdominal wall hernia     CT Renal 6/11/2018---Small fat containing superior ventral abdominal wall hernia at the epicardial pacing lead site.    Abnormal mammogram 10/12/2021    Acute cystitis without hematuria 05/10/2022    Acute ischemic right middle cerebral artery (MCA) stroke 07/20/2024    GRACE (acute kidney injury) 11/22/2021    Anxiety     Aphasia 07/19/2024    Arthritis     ZEN HIPS    Breast cancer in female 08/2021    LEFT BREAST    Bronchitis 08/18/2016    Never smoked      C. difficile colitis 11/29/2021    Cellulitis of axilla, left 12/23/2021    Chronic  diastolic heart failure 2021    Chronic kidney disease     stage 4, GFR 15-29 ml/min    Chronic midline low back pain without sciatica 2018    Closed nondisplaced fracture of distal phalanx of left great toe with routine healing 10/22/2018    Coronary artery disease 1993    heart transplant    COVID-19 in immunocompromised patient 2024    Cystitis 05/10/2022    Depression     Encounter for blood transfusion     Encounter for palliative care 10/14/2024    Fibromyalgia     on Lyrica    Heart failure     native heart cardiomyopathy    Heart transplanted     due to cardiomyopathy    History of hyperparathyroidism; Hyperparathyroidism, secondary renal     PT DENIES    Hypertension     Immune disorder     anti rejection meds    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    Impaired mobility 2022    Iron deficiency anemia 08/15/2017    Kidney stones     passed per pt    Obesity     Obesity (BMI 30.0-34.9) 2019    Other osteoporosis without current pathological fracture 2019    Other pancytopenia 2024    Parotitis, acute 2023    Pharyngitis 2019    Pneumonia due to infectious organism 2024    PONV (postoperative nausea and vomiting)     Severe sepsis 2021    Shingles 2003 approx    left leg    Subclinical hypothyroidism 2023    Thrombocytopenia, unspecified 2021    Trouble in sleeping     Urinary incontinence          SOCIAL HISTORY (MARRIAGE, EMPLOYMENT, etc.):  Living Situation: Alone, at [facility].   Relationship status Single; never .   Children/Family: 2 sons. 1 sonFlakito, is .  Son, Moy Krishnamurthy lives in [place] with wife.  Estranged granddtrQiana.  Estranged grandsonMoy. Cousin.   Supports:Jewish friends.   Education/Vocation: Cleaning, Sitting.  Sikh/Spirituality: [employer]. Volunteers as a .   Hobbies and Interests: Crafting.     Current social  "stressors:   CVA July 2024.    CKD 4. Has been told she very likely will need to start dialysis soon.  Transplanted heart has reached its max expectancy.   Grandson's arrest in August 2024; news coverage of his arrest.  Very stressful.   Death of son, Flakito.  Years ago.   Loss of contact with Flakito's daughter, Qiana (or Alda Kiran).   Hx of heart transplant in 1993. Has CKD stage 4. "You are looking at a miracle".  Tired of being in the hospital.    Hx of breast cancer; completed chemo and rad tx.   Goal is to drive again; resume her active life.     Current symptoms:  Depression: decreased appetite.  Anxiety: denies.  Insomnia:  chronic insomnia, now on 5mg ambien and temazapan. Still wakes her up once per night. Years of dealing with this.  .  Astrid:  hyperactivity, increased goal directed activity , and racing thoughts or rumination.  Psychosis: denies .    MENTAL HEALTH STATUS EXAM  General Appearance:  unremarkable, age appropriate   Speech: normal tone, normal rate, normal pitch, normal volume      Level of Cooperation: cooperative      Thought Processes: normal and logical   Mood: steady      Thought Content: normal, no suicidality, no homicidality, delusions, or paranoia   Affect: congruent and appropriate   Orientation: Oriented x3   Memory: Appears WNL; patient not tested.    Attention Span & Concentration: intact   Fund of General Knowledge: intact and appropriate to age and level of education   Judgment & Insight: good   Language  intact     Session Content/Presenting Problem Hx:    Met with patient for initial session.  Patient is very talkative, her speech is somewhat pressured and she does not make eye contact.  She tells her story in a very quick manner with few pauses.  PHQ and LUPE administered; patient denies most symptoms - does not seem accurate.  She says "I go up up up then will cry half the day."  Patient describes herself as someone who will try to push through any obstacles; she tired not " to let things get her down.  Ends up feeling overwhelmed.  Fear of being judged by others.   Patient is an only child; parents are . She says both of her parents were alcoholics. Patient says she was very shy as a young girl. 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  Nadia has a cousin here; they talk regularly. Cousin is often hard to get along with. Nadia has lived at [facility] for about 8 years. She is close with people there and likes living there.  She is close with her Mandaeism community.  They planned a big party for her 69th birthday this summer.  She tried to do everything to prepare for the party. She was recovering from bladder surgery and had been told to take it easy. Despite that she worked very hard on party prep, including climbing on ladders and blowing up balloons.  She twisted her back trying to lift and move boxes. Then she suffered a stroke. Ended up in hospital and then went to [facility] for inpatient rehab.  Has recently completed outpatient PT/OT.  She has issues with Foot drop and now walks with a cane.  Patient says she has always tried to do too much; attributes this to having been an only child, relying on herself.  She was very depressed and scared from having the stroke. She was sad to have missed the birthday celebration that had been planned.    Patient has had issues with her transplant heart; has had some valve leakage. She has been told that her renal function is worsening. She has been told she may need to start on dialysis soon.  Found this very upsetting. She felt sad and scared when the doctor talked with her about advance care planning. Says she hopes to live into her 90's. She is supposed to stick to a Renal diet and limit her fluids.   Patient has back issues; she recently had an HELLEN which helped. She sleeps in a lift chair.  Gets up often during the night.  Fell recently and bruised her hip.  Says she was  going to get chips and a drink from the machine despite having said she was supposed to limit her sugar and salt.    Patient describes difficulties getting along with her one surviving son, Moy Krishnamurthy. He is an alcoholic; has been since he was young. Has been in rehab many times. Unable to maintain sobriety. When drunk he is angry and rages.  He has lost jobs.  His first wife  him. He is remarried; this wife stays with him. She supports them by teaching, tutoring and taking in renters in their home. The son's only child, Moy, is also troubled. He was mostly raised by his mother. Moy is malcolm which his father does not want to accept.  A few months ago, the grandsonBerumberto was arrested after attacking his stepdad with a machete. The stepdad survived and did not press charges.  Patient was stressed and she was embarrassed by the news coverage.  Moy does not talk to her or to his dad.  Patient stays in touch with Moy's mother so she learns how he is doing.   Nadia's son Moy Krihsnamurthy is her power of ; they are estranged at times but are currently talking. She is on good terms with his wife.   Patient's younger son Flakito  of sepsis in his mid-30's. He had been sick; he did not finish his antibiotics; ended up in renal failure and he . Flakito had always done well in school; he attended [employer], he loved to sing in [employer]. Nadia always had a good relationship with Flakito. Before his death, Flakito and his girlfriend Enma had had a baby dtr named Qiana.  Patient lost touch with Enma for 7 years. Enma renamed the child Alda Kiran. Patient consulted an  about grandparent visitation rights but she did not want to force Enma to allow her contact.  Enma did let her see the child when she was 8 years old; then lost contact again. She is hopeful that someday the child will reach out on her own to get to know her grandmother and her extended family on her dad's side.  Very  painful for patient.     Patient names significant stressors: her health issues, death of her son, estrangement of other son, estrangement of grandson, loss of contact with granddaughter, limited family support.  She is also not happy to no longer be driving.  Says her goal is to resume driving and continue to live a full active life.          STRENGTHS AND LIABILITIES: Strength: Patient is expressive/articulate., Strength: Patient is motivated for change., Strength: Patient has positive support network.    IMPRESSION:   My diagnostic impression is Major Depressive Disorder, Recurrent, Mild (F33.0) and uncomp bereavement, as evidenced by patient's description of feeling down at times, tearfulness, sadness from loss of son, grandson, granddaughter, family stressors, health stressors. She does not sleep well.     TREATMENT GOALS: Depression: increasing self-reward for positive behaviors (one/day), increasing self-reward for positive thoughts (one/day), and reducing fatigue  Grief: process grief related to son's death, estrangement from a grandson and a granddaughter.     PLAN: In this session a psychosocial evaluation was conducted to get history and determine a treatment plan. CBT will be utilized in future individual  therapy sessions to increase interaction, insight, support, and behavior modification.     NEXT APPOINTMENT: 2 weeks. November 19th

## 2024-11-04 NOTE — PROGRESS NOTES
Nurse visit for BP assessment. /87 P 88 T 97.9; repeat /87 P 84. [facility] BP log sheet provided to pt to assess BP BID at home. Pt ranks pain 6 on 1-10 scale, had 1.5 hour appt today with LILLIAN Salgado. Dc'd home with [facility] BP log + instructions.

## 2024-11-07 ENCOUNTER — TELEPHONE (OUTPATIENT)
Dept: PRIMARY CARE CLINIC | Facility: CLINIC | Age: 69
End: 2024-11-07
Payer: MEDICARE

## 2024-11-07 NOTE — TELEPHONE ENCOUNTER
Received call from pt reporting fluctuations in blood pressure 170's/'s, 90's/50-70's within short spans of time. Pt also reports, weird feeling in her chest and an episode today where she felt like she would pass out. Advised to go to the ED, confirms emergent need and understanding.

## 2024-11-08 ENCOUNTER — INFUSION (OUTPATIENT)
Dept: INFUSION THERAPY | Facility: HOSPITAL | Age: 69
End: 2024-11-08
Attending: INTERNAL MEDICINE
Payer: MEDICARE

## 2024-11-08 VITALS
SYSTOLIC BLOOD PRESSURE: 137 MMHG | HEART RATE: 96 BPM | RESPIRATION RATE: 20 BRPM | OXYGEN SATURATION: 99 % | TEMPERATURE: 98 F | DIASTOLIC BLOOD PRESSURE: 79 MMHG

## 2024-11-08 DIAGNOSIS — D63.1 ANEMIA ASSOCIATED WITH STAGE 4 CHRONIC RENAL FAILURE: ICD-10-CM

## 2024-11-08 DIAGNOSIS — N18.4 ANEMIA ASSOCIATED WITH STAGE 4 CHRONIC RENAL FAILURE: ICD-10-CM

## 2024-11-08 DIAGNOSIS — M81.8 OTHER OSTEOPOROSIS WITHOUT CURRENT PATHOLOGICAL FRACTURE: Primary | ICD-10-CM

## 2024-11-08 PROCEDURE — 96372 THER/PROPH/DIAG INJ SC/IM: CPT | Mod: HCNC

## 2024-11-08 PROCEDURE — 63600175 PHARM REV CODE 636 W HCPCS: Mod: JZ,EC,JG,HCNC | Performed by: NURSE PRACTITIONER

## 2024-11-08 RX ADMIN — EPOETIN ALFA-EPBX 20000 UNITS: 20000 INJECTION, SOLUTION INTRAVENOUS; SUBCUTANEOUS at 02:11

## 2024-11-08 NOTE — DISCHARGE INSTRUCTIONS
[facility]  [address] [facility]  [address]  [phone] phone     [phone] fax  Hours of Operation: Monday- Friday 8:00am- 5:00pm  After hours phone  [phone]  Hematology / Oncology Physicians on call      SALENA Thurman Dr., NP Phaon Dunbar, NP Khelsea Conley, FNP    Please call with any concerns regarding your appointment today.

## 2024-11-10 NOTE — ASSESSMENT & PLAN NOTE
Cont PT, pregablin, f/u pain management - suspect likely contributing factor in urinary issues also

## 2024-11-10 NOTE — ASSESSMENT & PLAN NOTE
New finding in long-standing transplant - being followed and management by Cardiology and transplant team

## 2024-11-12 ENCOUNTER — TELEPHONE (OUTPATIENT)
Dept: CARDIOLOGY | Facility: HOSPITAL | Age: 69
End: 2024-11-12
Payer: MEDICARE

## 2024-11-12 ENCOUNTER — HOSPITAL ENCOUNTER (INPATIENT)
Facility: HOSPITAL | Age: 69
LOS: 1 days | Discharge: HOME HEALTH | DRG: 313 | End: 2024-11-13
Attending: EMERGENCY MEDICINE | Admitting: INTERNAL MEDICINE
Payer: MEDICARE

## 2024-11-12 ENCOUNTER — HOSPITAL ENCOUNTER (OUTPATIENT)
Dept: CARDIOLOGY | Facility: HOSPITAL | Age: 69
Discharge: HOME | End: 2024-11-12
Attending: INTERNAL MEDICINE
Payer: MEDICARE

## 2024-11-12 ENCOUNTER — TELEPHONE (OUTPATIENT)
Dept: PAIN MEDICINE | Facility: CLINIC | Age: 69
End: 2024-11-12
Payer: MEDICARE

## 2024-11-12 ENCOUNTER — DOCUMENTATION ONLY (OUTPATIENT)
Dept: CARDIOLOGY | Facility: HOSPITAL | Age: 69
End: 2024-11-12
Payer: MEDICARE

## 2024-11-12 VITALS — WEIGHT: 151.56 LBS | HEIGHT: 62 IN | HEART RATE: 83 BPM | BODY MASS INDEX: 27.89 KG/M2

## 2024-11-12 DIAGNOSIS — R07.9 CHEST PAIN: ICD-10-CM

## 2024-11-12 DIAGNOSIS — R45.89 ANXIETY ABOUT HEALTH: ICD-10-CM

## 2024-11-12 DIAGNOSIS — Z94.1 HISTORY OF HEART TRANSPLANT: ICD-10-CM

## 2024-11-12 DIAGNOSIS — T78.40XA ALLERGIC REACTION, INITIAL ENCOUNTER: Primary | ICD-10-CM

## 2024-11-12 DIAGNOSIS — N18.4 CKD (CHRONIC KIDNEY DISEASE) STAGE 4, GFR 15-29 ML/MIN: ICD-10-CM

## 2024-11-12 DIAGNOSIS — Z79.899 IMMUNOCOMPROMISED STATE DUE TO DRUG THERAPY: ICD-10-CM

## 2024-11-12 DIAGNOSIS — I10 CHRONIC HYPERTENSION: ICD-10-CM

## 2024-11-12 DIAGNOSIS — Z94.1 HEART TRANSPLANTED: Chronic | ICD-10-CM

## 2024-11-12 DIAGNOSIS — I50.42 CHRONIC COMBINED SYSTOLIC AND DIASTOLIC HEART FAILURE: ICD-10-CM

## 2024-11-12 DIAGNOSIS — N18.4 CKD (CHRONIC KIDNEY DISEASE) STAGE 4, GFR 15-29 ML/MIN: Chronic | ICD-10-CM

## 2024-11-12 DIAGNOSIS — D84.821 IMMUNOCOMPROMISED STATE DUE TO DRUG THERAPY: ICD-10-CM

## 2024-11-12 DIAGNOSIS — R06.2 WHEEZING: ICD-10-CM

## 2024-11-12 PROBLEM — R06.02 SOB (SHORTNESS OF BREATH): Status: ACTIVE | Noted: 2024-11-12

## 2024-11-12 PROBLEM — T50.905A ADVERSE DRUG REACTION: Status: ACTIVE | Noted: 2024-11-12

## 2024-11-12 LAB
ALBUMIN SERPL BCP-MCNC: 3.8 G/DL (ref 3.5–5.2)
ALP SERPL-CCNC: 140 U/L (ref 40–150)
ALT SERPL W/O P-5'-P-CCNC: 32 U/L (ref 10–44)
ANION GAP SERPL CALC-SCNC: 14 MMOL/L (ref 8–16)
AORTIC ROOT ANNULUS: 3.02 CM
APTT PPP: 28.3 SEC (ref 21–32)
AST SERPL-CCNC: 50 U/L (ref 10–40)
AV INDEX (PROSTH): 0.85
AV MEAN GRADIENT: 2.5 MMHG
AV PEAK GRADIENT: 4 MMHG
AV VALVE AREA BY VELOCITY RATIO: 2.5 CM²
AV VALVE AREA: 2.7 CM²
AV VELOCITY RATIO: 0.8
BASOPHILS # BLD AUTO: 0.02 K/UL (ref 0–0.2)
BASOPHILS NFR BLD: 0.6 % (ref 0–1.9)
BILIRUB SERPL-MCNC: 0.5 MG/DL (ref 0.1–1)
BNP SERPL-MCNC: 764 PG/ML (ref 0–99)
BSA FOR ECHO PROCEDURE: 1.73 M2
BUN SERPL-MCNC: 59 MG/DL (ref 8–23)
CALCIUM SERPL-MCNC: 9.3 MG/DL (ref 8.7–10.5)
CHLORIDE SERPL-SCNC: 106 MMOL/L (ref 95–110)
CO2 SERPL-SCNC: 22 MMOL/L (ref 23–29)
CREAT SERPL-MCNC: 4.1 MG/DL (ref 0.5–1.4)
CV ECHO LV RWT: 0.62 CM
CV STRESS BASE HR: 83 BPM
DIASTOLIC BLOOD PRESSURE: 71 MMHG
DIFFERENTIAL METHOD BLD: ABNORMAL
DOP CALC AO PEAK VEL: 1 M/S
DOP CALC AO VTI: 23.8 CM
DOP CALC LVOT AREA: 3.1 CM2
DOP CALC LVOT DIAMETER: 2 CM
DOP CALC LVOT PEAK VEL: 0.8 M/S
DOP CALC LVOT STROKE VOLUME: 63.7 CM3
DOP CALC RVOT PEAK VEL: 0.52 M/S
DOP CALC RVOT VTI: 10.5 CM
DOP CALCLVOT PEAK VEL VTI: 20.3 CM
E WAVE DECELERATION TIME: 147.63 MSEC
E/A RATIO: 1.29
ECHO LV POSTERIOR WALL: 1.2 CM (ref 0.6–1.1)
EJECTION FRACTION: 65 %
EOSINOPHIL # BLD AUTO: 0.2 K/UL (ref 0–0.5)
EOSINOPHIL NFR BLD: 6.7 % (ref 0–8)
ERYTHROCYTE [DISTWIDTH] IN BLOOD BY AUTOMATED COUNT: 15 % (ref 11.5–14.5)
EST. GFR  (NO RACE VARIABLE): 11 ML/MIN/1.73 M^2
FRACTIONAL SHORTENING: 33.3 % (ref 28–44)
GLUCOSE SERPL-MCNC: 92 MG/DL (ref 70–110)
HCT VFR BLD AUTO: 30.9 % (ref 37–48.5)
HGB BLD-MCNC: 10.1 G/DL (ref 12–16)
IMM GRANULOCYTES # BLD AUTO: 0.01 K/UL (ref 0–0.04)
IMM GRANULOCYTES NFR BLD AUTO: 0.3 % (ref 0–0.5)
INR PPP: 1 (ref 0.8–1.2)
INTERVENTRICULAR SEPTUM: 1 CM (ref 0.6–1.1)
IVRT: 99.9 MSEC
LEFT INTERNAL DIMENSION IN SYSTOLE: 2.6 CM (ref 2.1–4)
LEFT VENTRICLE DIASTOLIC VOLUME INDEX: 39.93 ML/M2
LEFT VENTRICLE DIASTOLIC VOLUME: 67.08 ML
LEFT VENTRICLE MASS INDEX: 83.4 G/M2
LEFT VENTRICLE SYSTOLIC VOLUME INDEX: 15 ML/M2
LEFT VENTRICLE SYSTOLIC VOLUME: 25.28 ML
LEFT VENTRICULAR INTERNAL DIMENSION IN DIASTOLE: 3.9 CM (ref 3.5–6)
LEFT VENTRICULAR MASS: 140.1 G
LV LATERAL E/E' RATIO: 9 M/S
LVED V (TEICH): 67.08 ML
LVES V (TEICH): 25.28 ML
LVOT MG: 1.66 MMHG
LVOT MV: 0.61 CM/S
LYMPHOCYTES # BLD AUTO: 1 K/UL (ref 1–4.8)
LYMPHOCYTES NFR BLD: 31.7 % (ref 18–48)
MAGNESIUM SERPL-MCNC: 2.1 MG/DL (ref 1.6–2.6)
MCH RBC QN AUTO: 28.9 PG (ref 27–31)
MCHC RBC AUTO-ENTMCNC: 32.7 G/DL (ref 32–36)
MCV RBC AUTO: 89 FL (ref 82–98)
MONOCYTES # BLD AUTO: 0.5 K/UL (ref 0.3–1)
MONOCYTES NFR BLD: 15.1 % (ref 4–15)
MV PEAK A VEL: 0.56 M/S
MV PEAK E VEL: 0.72 M/S
MV STENOSIS PRESSURE HALF TIME: 42.81 MS
MV VALVE AREA P 1/2 METHOD: 5.14 CM2
NEUTROPHILS # BLD AUTO: 1.4 K/UL (ref 1.8–7.7)
NEUTROPHILS NFR BLD: 45.6 % (ref 38–73)
NRBC BLD-RTO: 0 /100 WBC
OHS CV CPX 1 MINUTE RECOVERY HEART RATE: 118 BPM
OHS CV CPX 85 PERCENT MAX PREDICTED HEART RATE MALE: 128
OHS CV CPX MAX PREDICTED HEART RATE: 150
OHS CV CPX PATIENT IS FEMALE: 1
OHS CV CPX PATIENT IS MALE: 0
OHS CV CPX PEAK DIASTOLIC BLOOD PRESSURE: 76 MMHG
OHS CV CPX PEAK HEAR RATE: 127 BPM
OHS CV CPX PEAK RATE PRESSURE PRODUCT: ABNORMAL
OHS CV CPX PEAK SYSTOLIC BLOOD PRESSURE: 212 MMHG
OHS CV CPX PERCENT MAX PREDICTED HEART RATE ACHIEVED: 88
OHS CV CPX RATE PRESSURE PRODUCT PRESENTING: ABNORMAL
OHS CV INITIAL DOSE: 10 MCG/KG/MIN
OHS CV PEAK DOSE: 30 MCG/KG/MIN
OHS QRS DURATION: 152 MS
OHS QRS DURATION: 160 MS
OHS QTC CALCULATION: 567 MS
OHS QTC CALCULATION: 569 MS
PISA TR MAX VEL: 3.04 M/S
PLATELET # BLD AUTO: 223 K/UL (ref 150–450)
PMV BLD AUTO: 10 FL (ref 9.2–12.9)
POCT GLUCOSE: 210 MG/DL (ref 70–110)
POTASSIUM SERPL-SCNC: 4.2 MMOL/L (ref 3.5–5.1)
PROT SERPL-MCNC: 8.3 G/DL (ref 6–8.4)
PROTHROMBIN TIME: 10.7 SEC (ref 9–12.5)
PV MEAN GRADIENT: 1 MMHG
PV PEAK GRADIENT: 2 MMHG
PV PEAK VELOCITY: 0.65 M/S
RA PRESSURE ESTIMATED: 3 MMHG
RBC # BLD AUTO: 3.49 M/UL (ref 4–5.4)
RIGHT VENTRICULAR END-DIASTOLIC DIMENSION: 3.44 CM
RV TB RVSP: 6 MMHG
SINUS: 2.79 CM
SODIUM SERPL-SCNC: 142 MMOL/L (ref 136–145)
STJ: 2.81 CM
SYSTOLIC BLOOD PRESSURE: 131 MMHG
T4 FREE SERPL-MCNC: 1.02 NG/DL (ref 0.71–1.51)
TDI LATERAL: 0.08 M/S
TR MAX PG: 37 MMHG
TROPONIN I SERPL DL<=0.01 NG/ML-MCNC: 0.02 NG/ML (ref 0–0.03)
TROPONIN I SERPL DL<=0.01 NG/ML-MCNC: 0.06 NG/ML (ref 0–0.03)
TROPONIN I SERPL DL<=0.01 NG/ML-MCNC: 0.06 NG/ML (ref 0–0.03)
TSH SERPL DL<=0.005 MIU/L-ACNC: 10.03 UIU/ML (ref 0.4–4)
TV REST PULMONARY ARTERY PRESSURE: 40 MMHG
WBC # BLD AUTO: 3.12 K/UL (ref 3.9–12.7)
Z-SCORE OF LEFT VENTRICULAR DIMENSION IN END DIASTOLE: -1.84
Z-SCORE OF LEFT VENTRICULAR DIMENSION IN END SYSTOLE: -0.87

## 2024-11-12 PROCEDURE — 93010 ELECTROCARDIOGRAM REPORT: CPT | Mod: HCNC,,, | Performed by: INTERNAL MEDICINE

## 2024-11-12 PROCEDURE — G0378 HOSPITAL OBSERVATION PER HR: HCPCS | Mod: HCNC

## 2024-11-12 PROCEDURE — 96372 THER/PROPH/DIAG INJ SC/IM: CPT | Performed by: INTERNAL MEDICINE

## 2024-11-12 PROCEDURE — 93351 STRESS TTE COMPLETE: CPT | Mod: HCNC

## 2024-11-12 PROCEDURE — 83880 ASSAY OF NATRIURETIC PEPTIDE: CPT | Mod: HCNC | Performed by: EMERGENCY MEDICINE

## 2024-11-12 PROCEDURE — 85610 PROTHROMBIN TIME: CPT | Mod: HCNC | Performed by: EMERGENCY MEDICINE

## 2024-11-12 PROCEDURE — 25000003 PHARM REV CODE 250: Mod: HCNC | Performed by: NURSE PRACTITIONER

## 2024-11-12 PROCEDURE — 63600175 PHARM REV CODE 636 W HCPCS: Mod: HCNC

## 2024-11-12 PROCEDURE — 93005 ELECTROCARDIOGRAM TRACING: CPT | Mod: HCNC

## 2024-11-12 PROCEDURE — 84484 ASSAY OF TROPONIN QUANT: CPT | Mod: HCNC | Performed by: EMERGENCY MEDICINE

## 2024-11-12 PROCEDURE — 99214 OFFICE O/P EST MOD 30 MIN: CPT | Mod: HCNC,,, | Performed by: INTERNAL MEDICINE

## 2024-11-12 PROCEDURE — 36415 COLL VENOUS BLD VENIPUNCTURE: CPT | Mod: HCNC,XB | Performed by: NURSE PRACTITIONER

## 2024-11-12 PROCEDURE — 63600175 PHARM REV CODE 636 W HCPCS: Mod: HCNC | Performed by: NURSE PRACTITIONER

## 2024-11-12 PROCEDURE — 84443 ASSAY THYROID STIM HORMONE: CPT | Mod: HCNC | Performed by: EMERGENCY MEDICINE

## 2024-11-12 PROCEDURE — 93351 STRESS TTE COMPLETE: CPT | Mod: 26,HCNC,, | Performed by: STUDENT IN AN ORGANIZED HEALTH CARE EDUCATION/TRAINING PROGRAM

## 2024-11-12 PROCEDURE — 25000242 PHARM REV CODE 250 ALT 637 W/ HCPCS: Mod: HCNC | Performed by: EMERGENCY MEDICINE

## 2024-11-12 PROCEDURE — 96376 TX/PRO/DX INJ SAME DRUG ADON: CPT | Mod: HCNC

## 2024-11-12 PROCEDURE — 80053 COMPREHEN METABOLIC PANEL: CPT | Mod: HCNC | Performed by: EMERGENCY MEDICINE

## 2024-11-12 PROCEDURE — 63600175 PHARM REV CODE 636 W HCPCS: Mod: HCNC | Performed by: INTERNAL MEDICINE

## 2024-11-12 PROCEDURE — 85025 COMPLETE CBC W/AUTO DIFF WBC: CPT | Mod: HCNC | Performed by: EMERGENCY MEDICINE

## 2024-11-12 PROCEDURE — 94640 AIRWAY INHALATION TREATMENT: CPT | Mod: HCNC,XB

## 2024-11-12 PROCEDURE — 84484 ASSAY OF TROPONIN QUANT: CPT | Mod: 91,HCNC | Performed by: NURSE PRACTITIONER

## 2024-11-12 PROCEDURE — 84439 ASSAY OF FREE THYROXINE: CPT | Mod: HCNC | Performed by: EMERGENCY MEDICINE

## 2024-11-12 PROCEDURE — 96374 THER/PROPH/DIAG INJ IV PUSH: CPT | Mod: HCNC

## 2024-11-12 PROCEDURE — 96376 TX/PRO/DX INJ SAME DRUG ADON: CPT

## 2024-11-12 PROCEDURE — 94761 N-INVAS EAR/PLS OXIMETRY MLT: CPT | Mod: HCNC

## 2024-11-12 PROCEDURE — 25000003 PHARM REV CODE 250: Mod: HCNC | Performed by: EMERGENCY MEDICINE

## 2024-11-12 PROCEDURE — 25000003 PHARM REV CODE 250: Mod: HCNC | Performed by: INTERNAL MEDICINE

## 2024-11-12 PROCEDURE — 85730 THROMBOPLASTIN TIME PARTIAL: CPT | Mod: HCNC | Performed by: EMERGENCY MEDICINE

## 2024-11-12 PROCEDURE — 63600175 PHARM REV CODE 636 W HCPCS: Mod: HCNC | Performed by: EMERGENCY MEDICINE

## 2024-11-12 PROCEDURE — 99291 CRITICAL CARE FIRST HOUR: CPT | Mod: HCNC

## 2024-11-12 PROCEDURE — 96375 TX/PRO/DX INJ NEW DRUG ADDON: CPT | Mod: HCNC

## 2024-11-12 PROCEDURE — 36415 COLL VENOUS BLD VENIPUNCTURE: CPT | Mod: HCNC | Performed by: EMERGENCY MEDICINE

## 2024-11-12 PROCEDURE — 83735 ASSAY OF MAGNESIUM: CPT | Mod: HCNC | Performed by: EMERGENCY MEDICINE

## 2024-11-12 RX ORDER — ACETAMINOPHEN 325 MG/1
650 TABLET ORAL EVERY 4 HOURS PRN
Status: DISCONTINUED | OUTPATIENT
Start: 2024-11-12 | End: 2024-11-13 | Stop reason: HOSPADM

## 2024-11-12 RX ORDER — PROCHLORPERAZINE EDISYLATE 5 MG/ML
5 INJECTION INTRAMUSCULAR; INTRAVENOUS EVERY 6 HOURS PRN
Status: DISCONTINUED | OUTPATIENT
Start: 2024-11-12 | End: 2024-11-13 | Stop reason: HOSPADM

## 2024-11-12 RX ORDER — ALUMINUM HYDROXIDE, MAGNESIUM HYDROXIDE, AND SIMETHICONE 1200; 120; 1200 MG/30ML; MG/30ML; MG/30ML
30 SUSPENSION ORAL 4 TIMES DAILY PRN
Status: DISCONTINUED | OUTPATIENT
Start: 2024-11-12 | End: 2024-11-13 | Stop reason: HOSPADM

## 2024-11-12 RX ORDER — CARVEDILOL 3.12 MG/1
3.12 TABLET ORAL 2 TIMES DAILY WITH MEALS
Status: DISCONTINUED | OUTPATIENT
Start: 2024-11-12 | End: 2024-11-13 | Stop reason: HOSPADM

## 2024-11-12 RX ORDER — SODIUM CHLORIDE 0.9 % (FLUSH) 0.9 %
10 SYRINGE (ML) INJECTION EVERY 12 HOURS PRN
Status: DISCONTINUED | OUTPATIENT
Start: 2024-11-12 | End: 2024-11-13 | Stop reason: HOSPADM

## 2024-11-12 RX ORDER — LORAZEPAM 2 MG/ML
INJECTION INTRAMUSCULAR
Status: COMPLETED
Start: 2024-11-12 | End: 2024-11-12

## 2024-11-12 RX ORDER — ZOLPIDEM TARTRATE 5 MG/1
5 TABLET ORAL NIGHTLY
Status: DISCONTINUED | OUTPATIENT
Start: 2024-11-12 | End: 2024-11-13 | Stop reason: HOSPADM

## 2024-11-12 RX ORDER — CALCITRIOL 0.25 UG/1
0.25 CAPSULE ORAL DAILY
Status: DISCONTINUED | OUTPATIENT
Start: 2024-11-13 | End: 2024-11-13 | Stop reason: HOSPADM

## 2024-11-12 RX ORDER — FUROSEMIDE 10 MG/ML
80 INJECTION INTRAMUSCULAR; INTRAVENOUS
Status: COMPLETED | OUTPATIENT
Start: 2024-11-12 | End: 2024-11-12

## 2024-11-12 RX ORDER — ASPIRIN 81 MG/1
81 TABLET ORAL DAILY
Status: DISCONTINUED | OUTPATIENT
Start: 2024-11-13 | End: 2024-11-13 | Stop reason: HOSPADM

## 2024-11-12 RX ORDER — ALPRAZOLAM 0.25 MG/1
0.25 TABLET ORAL 3 TIMES DAILY PRN
Status: DISCONTINUED | OUTPATIENT
Start: 2024-11-12 | End: 2024-11-13 | Stop reason: HOSPADM

## 2024-11-12 RX ORDER — LORAZEPAM 2 MG/ML
1 INJECTION INTRAMUSCULAR
Status: COMPLETED | OUTPATIENT
Start: 2024-11-12 | End: 2024-11-12

## 2024-11-12 RX ORDER — MORPHINE SULFATE 4 MG/ML
4 INJECTION, SOLUTION INTRAMUSCULAR; INTRAVENOUS
Status: COMPLETED | OUTPATIENT
Start: 2024-11-12 | End: 2024-11-12

## 2024-11-12 RX ORDER — DOBUTAMINE HYDROCHLORIDE 400 MG/100ML
10-40 INJECTION INTRAVENOUS CONTINUOUS
Status: DISCONTINUED | OUTPATIENT
Start: 2024-11-12 | End: 2024-11-13 | Stop reason: HOSPADM

## 2024-11-12 RX ORDER — POLYETHYLENE GLYCOL 3350 17 G/17G
17 POWDER, FOR SOLUTION ORAL 2 TIMES DAILY PRN
Status: DISCONTINUED | OUTPATIENT
Start: 2024-11-12 | End: 2024-11-13 | Stop reason: HOSPADM

## 2024-11-12 RX ORDER — PREGABALIN 25 MG/1
25 CAPSULE ORAL NIGHTLY
Status: DISCONTINUED | OUTPATIENT
Start: 2024-11-12 | End: 2024-11-13 | Stop reason: HOSPADM

## 2024-11-12 RX ORDER — NITROGLYCERIN 0.4 MG/1
0.4 TABLET SUBLINGUAL
Status: DISCONTINUED | OUTPATIENT
Start: 2024-11-12 | End: 2024-11-13 | Stop reason: HOSPADM

## 2024-11-12 RX ORDER — DIPHENHYDRAMINE HYDROCHLORIDE 50 MG/ML
25 INJECTION INTRAMUSCULAR; INTRAVENOUS EVERY 6 HOURS PRN
Status: DISCONTINUED | OUTPATIENT
Start: 2024-11-12 | End: 2024-11-13 | Stop reason: HOSPADM

## 2024-11-12 RX ORDER — TALC
6 POWDER (GRAM) TOPICAL NIGHTLY PRN
Status: DISCONTINUED | OUTPATIENT
Start: 2024-11-12 | End: 2024-11-12

## 2024-11-12 RX ORDER — ONDANSETRON HYDROCHLORIDE 2 MG/ML
4 INJECTION, SOLUTION INTRAVENOUS EVERY 8 HOURS PRN
Status: DISCONTINUED | OUTPATIENT
Start: 2024-11-12 | End: 2024-11-13 | Stop reason: HOSPADM

## 2024-11-12 RX ORDER — MORPHINE SULFATE 4 MG/ML
4 INJECTION, SOLUTION INTRAMUSCULAR; INTRAVENOUS
Status: DISCONTINUED | OUTPATIENT
Start: 2024-11-12 | End: 2024-11-13

## 2024-11-12 RX ORDER — IPRATROPIUM BROMIDE AND ALBUTEROL SULFATE 2.5; .5 MG/3ML; MG/3ML
3 SOLUTION RESPIRATORY (INHALATION)
Status: COMPLETED | OUTPATIENT
Start: 2024-11-12 | End: 2024-11-12

## 2024-11-12 RX ORDER — FAMOTIDINE 10 MG/ML
20 INJECTION INTRAVENOUS
Status: COMPLETED | OUTPATIENT
Start: 2024-11-12 | End: 2024-11-12

## 2024-11-12 RX ORDER — SODIUM BICARBONATE 650 MG/1
650 TABLET ORAL 2 TIMES DAILY
Status: DISCONTINUED | OUTPATIENT
Start: 2024-11-12 | End: 2024-11-13 | Stop reason: HOSPADM

## 2024-11-12 RX ORDER — ASPIRIN 325 MG
325 TABLET ORAL
Status: DISCONTINUED | OUTPATIENT
Start: 2024-11-12 | End: 2024-11-12 | Stop reason: HOSPADM

## 2024-11-12 RX ORDER — ONDANSETRON HYDROCHLORIDE 2 MG/ML
4 INJECTION, SOLUTION INTRAVENOUS
Status: COMPLETED | OUTPATIENT
Start: 2024-11-12 | End: 2024-11-12

## 2024-11-12 RX ORDER — ALPRAZOLAM 0.25 MG/1
0.25 TABLET ORAL
Status: COMPLETED | OUTPATIENT
Start: 2024-11-12 | End: 2024-11-12

## 2024-11-12 RX ORDER — GLUCAGON 1 MG
1 KIT INJECTION
Status: DISCONTINUED | OUTPATIENT
Start: 2024-11-12 | End: 2024-11-13 | Stop reason: HOSPADM

## 2024-11-12 RX ORDER — NITROGLYCERIN 400 UG/1
1 SPRAY ORAL
Status: DISCONTINUED | OUTPATIENT
Start: 2024-11-12 | End: 2024-11-12 | Stop reason: HOSPADM

## 2024-11-12 RX ORDER — IBUPROFEN 200 MG
24 TABLET ORAL
Status: DISCONTINUED | OUTPATIENT
Start: 2024-11-12 | End: 2024-11-13 | Stop reason: HOSPADM

## 2024-11-12 RX ORDER — ENOXAPARIN SODIUM 100 MG/ML
30 INJECTION SUBCUTANEOUS EVERY 24 HOURS
Status: DISCONTINUED | OUTPATIENT
Start: 2024-11-12 | End: 2024-11-13

## 2024-11-12 RX ORDER — ENOXAPARIN SODIUM 100 MG/ML
40 INJECTION SUBCUTANEOUS EVERY 24 HOURS
Status: DISCONTINUED | OUTPATIENT
Start: 2024-11-12 | End: 2024-11-12

## 2024-11-12 RX ORDER — CYCLOSPORINE 25 MG/1
75 CAPSULE, LIQUID FILLED ORAL 2 TIMES DAILY
Status: DISCONTINUED | OUTPATIENT
Start: 2024-11-12 | End: 2024-11-13 | Stop reason: HOSPADM

## 2024-11-12 RX ORDER — METHYLPREDNISOLONE SOD SUCC 125 MG
125 VIAL (EA) INJECTION
Status: COMPLETED | OUTPATIENT
Start: 2024-11-12 | End: 2024-11-12

## 2024-11-12 RX ORDER — LORAZEPAM 2 MG/ML
2 INJECTION INTRAMUSCULAR ONCE
Status: DISCONTINUED | OUTPATIENT
Start: 2024-11-12 | End: 2024-11-12

## 2024-11-12 RX ORDER — NALOXONE HCL 0.4 MG/ML
0.02 VIAL (ML) INJECTION
Status: DISCONTINUED | OUTPATIENT
Start: 2024-11-12 | End: 2024-11-13 | Stop reason: SDUPTHER

## 2024-11-12 RX ORDER — LORAZEPAM 2 MG/ML
1 INJECTION INTRAMUSCULAR ONCE
Status: COMPLETED | OUTPATIENT
Start: 2024-11-12 | End: 2024-11-12

## 2024-11-12 RX ORDER — IBUPROFEN 200 MG
16 TABLET ORAL
Status: DISCONTINUED | OUTPATIENT
Start: 2024-11-12 | End: 2024-11-13 | Stop reason: HOSPADM

## 2024-11-12 RX ORDER — DIPHENHYDRAMINE HYDROCHLORIDE 50 MG/ML
25 INJECTION INTRAMUSCULAR; INTRAVENOUS
Status: COMPLETED | OUTPATIENT
Start: 2024-11-12 | End: 2024-11-12

## 2024-11-12 RX ADMIN — RACEPINEPHRINE HYDROCHLORIDE 0.5 ML: 11.25 SOLUTION RESPIRATORY (INHALATION) at 11:11

## 2024-11-12 RX ADMIN — ALPRAZOLAM 0.25 MG: 0.25 TABLET ORAL at 01:11

## 2024-11-12 RX ADMIN — ONDANSETRON 4 MG: 2 INJECTION INTRAMUSCULAR; INTRAVENOUS at 12:11

## 2024-11-12 RX ADMIN — CYCLOSPORINE 75 MG: 25 CAPSULE, LIQUID FILLED ORAL at 09:11

## 2024-11-12 RX ADMIN — MORPHINE SULFATE 4 MG: 4 INJECTION INTRAVENOUS at 01:11

## 2024-11-12 RX ADMIN — NITROGLYCERIN 1 SPRAY: 400 SPRAY ORAL at 10:11

## 2024-11-12 RX ADMIN — CARVEDILOL 3.12 MG: 3.12 TABLET, FILM COATED ORAL at 04:11

## 2024-11-12 RX ADMIN — ONDANSETRON 4 MG: 2 INJECTION INTRAMUSCULAR; INTRAVENOUS at 01:11

## 2024-11-12 RX ADMIN — FAMOTIDINE 20 MG: 10 INJECTION, SOLUTION INTRAVENOUS at 11:11

## 2024-11-12 RX ADMIN — LORAZEPAM 1 MG: 2 INJECTION INTRAMUSCULAR; INTRAVENOUS at 06:11

## 2024-11-12 RX ADMIN — ZOLPIDEM TARTRATE 5 MG: 5 TABLET, FILM COATED ORAL at 09:11

## 2024-11-12 RX ADMIN — ASPIRIN 325 MG: 325 TABLET, FILM COATED ORAL at 11:11

## 2024-11-12 RX ADMIN — IPRATROPIUM BROMIDE AND ALBUTEROL SULFATE 3 ML: 2.5; .5 SOLUTION RESPIRATORY (INHALATION) at 11:11

## 2024-11-12 RX ADMIN — MORPHINE SULFATE 4 MG: 4 INJECTION INTRAVENOUS at 06:11

## 2024-11-12 RX ADMIN — MORPHINE SULFATE 4 MG: 4 INJECTION INTRAVENOUS at 12:11

## 2024-11-12 RX ADMIN — ENOXAPARIN SODIUM 30 MG: 30 INJECTION SUBCUTANEOUS at 04:11

## 2024-11-12 RX ADMIN — FUROSEMIDE 80 MG: 10 INJECTION, SOLUTION INTRAMUSCULAR; INTRAVENOUS at 01:11

## 2024-11-12 RX ADMIN — PREGABALIN 25 MG: 25 CAPSULE ORAL at 09:11

## 2024-11-12 RX ADMIN — METHYLPREDNISOLONE SODIUM SUCCINATE 125 MG: 125 INJECTION, POWDER, FOR SOLUTION INTRAMUSCULAR; INTRAVENOUS at 11:11

## 2024-11-12 RX ADMIN — ONDANSETRON 4 MG: 2 INJECTION INTRAMUSCULAR; INTRAVENOUS at 03:11

## 2024-11-12 RX ADMIN — LORAZEPAM 1 MG: 2 INJECTION INTRAMUSCULAR at 06:11

## 2024-11-12 RX ADMIN — LORAZEPAM 1 MG: 2 INJECTION INTRAMUSCULAR; INTRAVENOUS at 12:11

## 2024-11-12 RX ADMIN — DOBUTAMINE HYDROCHLORIDE 10 MCG/KG/MIN: 400 INJECTION INTRAVENOUS at 10:11

## 2024-11-12 RX ADMIN — DIPHENHYDRAMINE HYDROCHLORIDE 25 MG: 50 INJECTION, SOLUTION INTRAMUSCULAR; INTRAVENOUS at 01:11

## 2024-11-12 RX ADMIN — SODIUM BICARBONATE 650 MG TABLET 650 MG: at 09:11

## 2024-11-12 NOTE — HPI
"Ms. Damon is a 69 year old female patient whose current medical conditions include HTN, CKD stage IV, CAD, breast CA, GUSTAVO, CHF, prior CVA and history of heart transplant in 1991 who was sent to [facility] ED via EMS from cardiology clinic today due to chest pain that onset after a dobutamine stress test. Patient reported feeling a "squeezing, heavy" discomfort during her stress echo that persisted even after dobutamine had been discontinued. Given chest pain symptoms, nitro was administered x 2. Shortly thereafter, patient became markedly wheezy and SOB and had seizure like-activity/tremors, prompting the EMS call. She received steroids, racemic epinephrine, and ativan upon arrival to ED with improvement in her symptoms. Cardiology was consulted to assist with management. Patient seen and examined today in ED. Remains very anxious/jittery. Still mildly SOB and endorses slight chest discomfort although much improved from earlier today. She reports compliance with her home medications. Followed by advanced CHF/transplant team (Dr. Tubbs). Chart reviewed. Initial troponin negative. BNP > 700. CT of head with NAF. TTE 7/24 with normal EF, small PFO. Stress images/echo not completed today given acuity of situation/patient's symptoms. Transplant team aware of patient's admission at this facility.   "

## 2024-11-12 NOTE — ASSESSMENT & PLAN NOTE
Patients blood pressure range in the last 24 hours was: BP  Min: 80/50  Max: 213/107.The patient's inpatient anti-hypertensive regimen is listed below:  Current Antihypertensives  carvediloL tablet 3.125 mg, 2 times daily with meals, Oral    Plan  - BP is controlled, no changes needed to their regimen

## 2024-11-12 NOTE — HPI
"69 year old female, comorbid conditions include HTN, CKD stage IV, CAD, breast CA, GUSTAVO, CHF, prior CVA and history of heart transplant in 1991. Patient sent to [facility] ED via EMS from cardiology clinic due to chest pain that onset after a dobutamine stress test. Patient reported feeling a "squeezing, heavy" discomfort during her stress echo that persisted even after dobutamine had been discontinued. Given chest pain symptoms, nitro was administered x 2. Shortly thereafter, patient became markedly wheezy and SOB and had seizure like-activity/tremors, prompting the EMS call. She received steroids, racemic epinephrine, and ativan upon arrival to ED with improvement in her symptoms. Cardiology was consulted to assist with management. Patient seen and examined today in ED. Remains very anxious/jittery. Still mildly SOB and endorses slight chest discomfort although much improved from earlier today. She reports compliance with her home medications. Followed by advanced CHF/transplant team (Dr. Tubbs). Chart reviewed. Initial troponin negative. BNP > 700. CT of head with NAF. TTE 7/24 with normal EF, small PFO. Stress images/echo not completed today given acuity of situation/patient's symptoms. Transplant team aware of patient's admission at this facility. Patient is a full code. Placed in observation under the care of Hospital Medicine for management of allergic reaction/adverse drug reaction.  "

## 2024-11-12 NOTE — SUBJECTIVE & OBJECTIVE
Past Medical History:   Diagnosis Date    Abdominal wall hernia     CT Renal 6/11/2018---Small fat containing superior ventral abdominal wall hernia at the epicardial pacing lead site.    Abnormal mammogram 10/12/2021    Acute cystitis without hematuria 05/10/2022    Acute ischemic right middle cerebral artery (MCA) stroke 07/20/2024    GRACE (acute kidney injury) 11/22/2021    Anxiety     Aphasia 07/19/2024    Arthritis     ZEN HIPS    Breast cancer in female 08/2021    LEFT BREAST    Bronchitis 08/18/2016    Never smoked      C. difficile colitis 11/29/2021    Cellulitis of axilla, left 12/23/2021    Chronic diastolic heart failure 12/16/2021    Chronic kidney disease     stage 4, GFR 15-29 ml/min    Chronic midline low back pain without sciatica 06/18/2018    Closed nondisplaced fracture of distal phalanx of left great toe with routine healing 10/22/2018    Coronary artery disease 1993    heart transplant    COVID-19 in immunocompromised patient 02/26/2024    Cystitis 05/10/2022    Depression     Encounter for blood transfusion     Encounter for palliative care 10/14/2024    Fibromyalgia     on Lyrica    Heart failure     native heart cardiomyopathy    Heart transplanted 1993    due to cardiomyopathy    History of hyperparathyroidism; Hyperparathyroidism, secondary renal     PT DENIES    Hypertension     Immune disorder     anti rejection meds    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    Impaired mobility 07/28/2022    Iron deficiency anemia 08/15/2017    Kidney stones     passed per pt    Obesity     Obesity (BMI 30.0-34.9) 07/22/2019    Other osteoporosis without current pathological fracture 08/30/2019    Other pancytopenia 05/06/2024    Parotitis, acute 09/19/2023    Pharyngitis 01/09/2019    Pneumonia due to infectious organism 03/14/2024    PONV (postoperative nausea and vomiting)     Severe sepsis 11/22/2021    Shingles 2003 approx    left leg    Subclinical hypothyroidism 06/16/2023     Thrombocytopenia, unspecified 11/29/2021    Trouble in sleeping     Urinary incontinence        Past Surgical History:   Procedure Laterality Date    bladder implant Right 06/11/2024    BLADDER SURGERY  2015 approx    mesh - Dr Everett then 2nd reconstructive sx Dr Onofre    BREAST BIOPSY Bilateral     NEGATIVE    BREAST BIOPSY Right 10/31/2022    benign    BREAST LUMPECTOMY Left 2021    BREAST SURGERY Left 09/28/2015    Bx - benign    BREAST SURGERY Right 12/2015    Bx benign    CARDIAC PACEMAKER REMOVAL Left 06/26/2014    Pacer defirillator removed. Put in 1993 aat time of heart transplant    CARPAL TUNNEL RELEASE Left 03/03/2015    Dr. Hall    COLONOSCOPY N/A 02/25/2021    Procedure: COLONOSCOPY;  Surgeon: Freida Ramirez MD;  Location: [facility] ENDO;  Service: Endoscopy;  Laterality: N/A;    CYSTOCELE REPAIR      Twice with mesh removal    EPIDURAL STEROID INJECTION INTO CERVICAL SPINE N/A 02/02/2023    Procedure: T11/T12 IL HELLEN;  Surgeon: Jassi Pierre MD;  Location: [facility] PAIN MGT;  Service: Pain Management;  Laterality: N/A;    EPIDURAL STEROID INJECTION INTO CERVICAL SPINE N/A 9/16/2024    Procedure: T11/T12 IL HELLEN;  Surgeon: Jassi Pierre MD;  Location: [facility] PAIN MGT;  Service: Pain Management;  Laterality: N/A;    HEART TRANSPLANT  1993    HERNIA REPAIR Right 1971 approx    Inguinal    HYSTERECTOMY  1983    vag hyst /LSO     IMPLANTATION OF PERMANENT SACRAL NERVE STIMULATOR N/A 06/11/2024    Procedure: INSERTION, NEUROSTIMULATOR, PERMANENT, SACRAL;  Surgeon: Sami Cochran MD;  Location: [facility] OR;  Service: Urology;  Laterality: N/A;    INCISION AND DRAINAGE OF ABSCESS Left 12/24/2021    Procedure: INCISION AND DRAINAGE, ABSCESS;  Surgeon: Joseph Longo MD;  Location: [facility] OR;  Service: General;  Laterality: Left;    INJECTION OF ANESTHETIC AGENT AROUND MEDIAL BRANCH NERVES INNERVATING LUMBAR FACET JOINT Right 10/19/2022    Procedure: Right L4/L5 and L5/S1 MBB;  Surgeon: Jassi  MD Ignacio;  Location: [facility] PAIN MGT;  Service: Pain Management;  Laterality: Right;    INJECTION OF ANESTHETIC AGENT AROUND MEDIAL BRANCH NERVES INNERVATING LUMBAR FACET JOINT Right 11/09/2022    Procedure: Right L4/L5 and L5/S1 MBB;  Surgeon: Jassi Pierre MD;  Location: [facility] PAIN MGT;  Service: Pain Management;  Laterality: Right;    INJECTION OF ANESTHETIC AGENT INTO SACROILIAC JOINT Right 08/22/2022    Procedure: Right SIJ Injection Right L5/S1 Facte Injection;  Surgeon: Jassi Pierre MD;  Location: [facility] PAIN MGT;  Service: Pain Management;  Laterality: Right;    INSERTION OF TUNNELED CENTRAL VENOUS CATHETER (CVC) WITH SUBCUTANEOUS PORT N/A 11/09/2021    Procedure: [id number];  Surgeon: Christoph Douglas MD;  Location: [facility] OR;  Service: General;  Laterality: N/A;    RADIOFREQUENCY THERMOCOAGULATION Right 12/07/2022    Procedure: Right L4/L5 and L5/S1 Lumbar RFA;  Surgeon: Jassi Pierre MD;  Location: [facility] PAIN MGT;  Service: Pain Management;  Laterality: Right;    REMOVAL OF VASCULAR ACCESS PORT      ROBOT-ASSISTED LAPAROSCOPIC ABDOMINAL SACROCOLPOPEXY N/A 08/10/2023    Procedure: ROBOTIC SACROCOLPOPEXY, ABDOMEN;  Surgeon: PRANAY Villalobos MD;  Location: [facility] OR;  Service: OB/GYN;  Laterality: N/A;    ROBOT-ASSISTED LAPAROSCOPIC OOPHORECTOMY Right 08/10/2023    Procedure: ROBOTIC OOPHORECTOMY;  Surgeon: PRANAY Villalobos MD;  Location: [facility] OR;  Service: OB/GYN;  Laterality: Right;    SENTINEL LYMPH NODE BIOPSY Left 10/12/2021    Procedure: BIOPSY, LYMPH NODE, SENTINEL;  Surgeon: Christoph Douglas MD;  Location: [facility] OR;  Service: General;  Laterality: Left;    TOE SURGERY      XI ROBOTIC URETHROPEXY N/A 08/10/2023    Procedure: XI ROBOTIC URETHROPEXY;  Surgeon: PRANAY Villalobos MD;  Location: [facility] OR;  Service: OB/GYN;  Laterality: N/A;       Review of patient's allergies indicates:   Allergen Reactions    Lisinopril Swelling and Rash    Nitro Shortness Of Breath     Audible  wheezing     Hydrocodone-acetaminophen Nausea Only    Augmentin [amoxicillin-pot clavulanate] Diarrhea    Zyvox [linezolid] Nausea And Vomiting       No current facility-administered medications on file prior to encounter.     Current Outpatient Medications on File Prior to Encounter   Medication Sig    aspirin (ECOTRIN) 81 MG EC tablet Take 1 tablet (81 mg total) by mouth once daily.    calcitRIOL (ROCALTROL) 0.25 MCG Cap Take 1 capsule (0.25 mcg total) by mouth once daily.    carvediloL (COREG) 3.125 MG tablet Take 1 tablet (3.125 mg total) by mouth 2 (two) times daily with meals. Hold parameters: SBP less than 110 and/or DBP less than 75    cycloSPORINE modified, NEORAL, (NEORAL) 25 MG capsule Take 3 capsules (75 mg total) by mouth 2 (two) times daily.    furosemide (LASIX) 20 MG tablet Take 2 tablets (40 mg total) by mouth once daily. HOLD UNTIL FOLLOW UP WITH PCP    hydrALAZINE (APRESOLINE) 25 MG tablet Take 25 mg by mouth every 8 (eight) hours. HOLD hydralazine for now; may restart if BP is elevated (Patient not taking: Reported on 10/25/2024)    ondansetron (ZOFRAN) 4 MG tablet Take 4 mg by mouth as needed for Nausea.    pantoprazole (PROTONIX) 20 MG tablet TAKE 1 TABLET ONE TIME DAILY    patiromer calcium sorbitex (VELTASSA) 8.4 gram PwPk Take 1 packet (8.4 g total) by mouth once daily    polyethylene glycol (GLYCOLAX) 17 gram PwPk Take 17 g by mouth once daily.    pregabalin (LYRICA) 25 MG capsule Take 1 capsule (25 mg total) by mouth every evening.    sodium bicarbonate 650 MG tablet Take 1 tablet (650 mg total) by mouth 2 (two) times daily.    temazepam (RESTORIL) 30 mg capsule Take 1 capsule (30 mg total) by mouth nightly as needed for Insomnia. FOR INSOMNIA    tiZANidine 4 mg Cap Take 2 mg by mouth nightly as needed (muscle spasm).    vitamin E 400 UNIT capsule Take 400 Units by mouth once daily.    zolpidem (AMBIEN) 5 MG Tab Take 1 tablet (5 mg total) by mouth every evening.     Family History        Problem Relation (Age of Onset)    Breast cancer Mother, Maternal Grandmother    Cancer Mother (38)    Cataracts Cousin    Heart disease Maternal Grandmother    Hypertension Son          Tobacco Use    Smoking status: Never     Passive exposure: Never    Smokeless tobacco: Never   Substance and Sexual Activity    Alcohol use: Never     Alcohol/week: 0.0 standard drinks of alcohol    Drug use: No    Sexual activity: Not Currently     Partners: Male     Birth control/protection: See Surgical Hx     Review of Systems   Constitutional: Positive for malaise/fatigue.   HENT: Negative.     Eyes: Negative.    Cardiovascular:  Positive for chest pain and dyspnea on exertion.   Respiratory:  Positive for shortness of breath and wheezing.    Endocrine: Negative.    Hematologic/Lymphatic: Negative.    Skin: Negative.    Musculoskeletal: Negative.    Gastrointestinal: Negative.    Genitourinary: Negative.    Neurological:  Positive for weakness.        Jitteriness/speech fast/stuttering at times     Psychiatric/Behavioral:  The patient is nervous/anxious.    Allergic/Immunologic: Negative.      Objective:     Vital Signs (Most Recent):  Temp: 97.5 °F (36.4 °C) (11/12/24 1200)  Pulse: 93 (11/12/24 1430)  Resp: (!) 22 (11/12/24 1430)  BP: (!) 182/92 (11/12/24 1430)  SpO2: 99 % (11/12/24 1430) Vital Signs (24h Range):  Temp:  [97.5 °F (36.4 °C)] 97.5 °F (36.4 °C)  Pulse:  [83-96] 93  Resp:  [22-26] 22  SpO2:  [97 %-100 %] 99 %  BP: (147-192)/(70-96) 182/92     Weight: 67.4 kg (148 lb 9.4 oz)  Body mass index is 27.18 kg/m².    SpO2: 99 %         Intake/Output Summary (Last 24 hours) at 11/12/2024 1530  Last data filed at 11/12/2024 1143  Gross per 24 hour   Intake 24 ml   Output --   Net 24 ml       Lines/Drains/Airways       Peripheral Intravenous Line  Duration                  Peripheral IV - Single Lumen 11/12/24 1025 24 G Anterior;Distal;Right Upper Arm <1 day         Peripheral IV - Single Lumen 11/12/24 1202 18 G 1 1/4 in  Anterior;Left Upper Arm <1 day                     Physical Exam  Vitals and nursing note reviewed.   Constitutional:       Appearance: She is ill-appearing.   HENT:      Head: Normocephalic and atraumatic.   Eyes:      Pupils: Pupils are equal, round, and reactive to light.   Cardiovascular:      Rate and Rhythm: Normal rate and regular rhythm.      Heart sounds: S1 normal and S2 normal. No murmur heard.  Pulmonary:      Breath sounds: Wheezing (faint) and rhonchi present.   Abdominal:      Palpations: Abdomen is soft.      Comments: +hernia   Musculoskeletal:      Right lower leg: No edema.      Left lower leg: No edema.   Skin:     General: Skin is warm and dry.   Neurological:      Mental Status: She is oriented to person, place, and time.   Psychiatric:      Comments: anxious          Significant Labs: CMP   Recent Labs   Lab 11/12/24  1216      K 4.2      CO2 22*   GLU 92   BUN 59*   CREATININE 4.1*   CALCIUM 9.3   PROT 8.3   ALBUMIN 3.8   BILITOT 0.5   ALKPHOS 140   AST 50*   ALT 32   ANIONGAP 14   , CBC   Recent Labs   Lab 11/12/24  1216   WBC 3.12*   HGB 10.1*   HCT 30.9*      , Troponin   Recent Labs   Lab 11/12/24  1216   TROPONINI 0.019   , and All pertinent lab results from the last 24 hours have been reviewed.    Significant Imaging: Echocardiogram: Transthoracic echo (TTE) complete (Cupid Only):   Results for orders placed or performed during the hospital encounter of 07/19/24   Echo Saline Bubble? Yes   Result Value Ref Range    BSA 1.83 m2    LVOT stroke volume 81.51 cm3    LVIDd 4.09 3.5 - 6.0 cm    LV Systolic Volume 21.41 mL    LV Systolic Volume Index 12.0 mL/m2    LVIDs 2.46 2.1 - 4.0 cm    LV Diastolic Volume 73.65 mL    LV Diastolic Volume Index 41.38 mL/m2    Left Ventricular End Systolic Volume by Teichholz Method 21.41 mL    Left Ventricular End Diastolic Volume by Teichholz Method 73.65 mL    IVS 1.08 0.6 - 1.1 cm    LVOT diameter 2.01 cm    LVOT area 3.2 cm2    FS 40  28 - 44 %    Left Ventricle Relative Wall Thickness 0.43 cm    PW 0.87 0.6 - 1.1 cm    LV mass 127.01 g    LV Mass Index 71 g/m2    MV Peak E Jacky 0.82 m/s    TDI LATERAL 0.13 m/s    TDI SEPTAL 0.07 m/s    E/E' ratio 8.20 m/s    MV Peak A Jacky 0.72 m/s    TR Max Jacky 2.8 m/s    E/A ratio 1.14     IVRT 91.34 msec    E wave deceleration time 168.57 msec    LV SEPTAL E/E' RATIO 11.71 m/s    LV LATERAL E/E' RATIO 6.31 m/s    LVOT peak jacky 1.28 m/s    Left Ventricular Outflow Tract Mean Velocity 0.89 cm/s    Left Ventricular Outflow Tract Mean Gradient 3.61 mmHg    RVOT peak VTI 11.8 cm    TAPSE 1.56 cm    RV/LV Ratio 0.79 cm    LA size 4.06 cm    AV mean gradient 6 mmHg    AV peak gradient 10 mmHg    Ao peak jacky 1.60 m/s    Ao VTI 35.80 cm    LVOT peak VTI 25.70 cm    AV valve area 2.28 cm²    AV Velocity Ratio 0.80     AV index (prosthetic) 0.72     PRESLEY by Velocity Ratio 2.54 cm²    Triscuspid Valve Regurgitation Peak Gradient 31 mmHg    PV mean gradient 1 mmHg    PV PEAK VELOCITY 0.77 m/s    PV peak gradient 2 mmHg    Pulmonary Valve Mean Velocity 0.63 m/s    RVOT peak jacky 0.63 m/s    Ao root annulus 3.18 cm    STJ 2.98 cm    Ascending aorta 3.30 cm    Mean e' 0.10 m/s    ZLVIDS -1.68     ZLVIDD -1.85     RVDD 3.23 cm    TV resting pulmonary artery pressure 34 mmHg    RV TB RVSP 6 mmHg    Est. RA pres 3 mmHg    Narrative      Left Ventricle: The left ventricle is normal in size. Normal wall   thickness. There is normal systolic function with a visually estimated   ejection fraction of 55 - 60%. There is normal diastolic function.    Right Ventricle: Normal right ventricular cavity size. Wall thickness   is normal. Systolic function is normal.    Left Atrium: Patent foramen ovale visualized with predominant right to   left shunting indicated by saline contrast.    Tricuspid Valve: There is mild regurgitation.    IVC/SVC: Normal venous pressure at 3 mmHg.    The patient is status post cardiac transplantation.     , EKG:  Reviewed, and X-Ray: CXR: X-Ray Chest 1 View (CXR): No results found for this visit on 11/12/24. and X-Ray Chest PA and Lateral (CXR): No results found for this visit on 11/12/24.

## 2024-11-12 NOTE — PROGRESS NOTES
Patient arrived for DSE. 131/71, HR-77, EGK- Incomplete RBBB.  Patient received max dose of 30mcg Dobutamine. Post infusion into recovery, patient stated feeling chest tightness, scale 10/10 and clutching  left chest area.  Called for support help, JOSIE TRACY present  Orders  Nitro Spray given 1059 X 1 orally, blood pressure 150/98,   Aspirin 325mg given  orally 1100 X 1tab  O2 @ 100% via NC  EMS called at 1102    1105 Dr. Silva present. compliance with above orders,   Repeated nitro 1111  EMS arrived at 1112, transported [facility] via EMS 1123 with patient's belongings   Attemped to notify family, no answer. Son [facility] returned and was [facility]

## 2024-11-12 NOTE — PROGRESS NOTES
Pharmacist Renal Dose Adjustment Note    Nadia Damon is a 69 y.o. female being treated with the medication enoxaparin.    Patient Data:    Vital Signs (Most Recent):  Temp: 97.5 °F (36.4 °C) (11/12/24 1200)  Pulse: 93 (11/12/24 1430)  Resp: (!) 22 (11/12/24 1430)  BP: (!) 182/92 (11/12/24 1430)  SpO2: 99 % (11/12/24 1430) Vital Signs (72h Range):  Temp:  [97.5 °F (36.4 °C)]   Pulse:  [83-96]   Resp:  [22-26]   BP: (147-192)/(70-96)   SpO2:  [97 %-100 %]      Recent Labs   Lab 11/12/24  1216   CREATININE 4.1*     Serum creatinine: 4.1 mg/dL (H) 11/12/24 1216  Estimated creatinine clearance: 11.5 mL/min (A)    Medication: enoxaparin dose: 40 mg frequency daily will be changed to medication: enoxaparin dose: 30 mg frequency: daily, for CrCl < 30 mL/min    Pharmacist's Name: Melissa Hunt

## 2024-11-12 NOTE — TELEPHONE ENCOUNTER
Reached out to patient to schedule appointment from messages. Apt has been made.   Pt understand. All questions answered.     Kye Pitts Medical Assistant

## 2024-11-12 NOTE — TELEPHONE ENCOUNTER
----- Message from Jesus sent at 11/12/2024  4:40 PM CST -----  Contact: pt @ [phone]  Patient is returning a phone call.     Who left a message for the patient: MD     Does patient know what this is regarding:  N/A     Would you like a call back, or a response through your [facility] portal?:  call back     Comments:

## 2024-11-12 NOTE — ASSESSMENT & PLAN NOTE
Patient has Diastolic (HFpEF) heart failure that is Chronic. On presentation their CHF was well compensated. Most recent BNP and echo results are listed below.  Recent Labs     11/12/24  1216   *     Latest ECHO  Results for orders placed during the hospital encounter of 07/19/24    Echo Saline Bubble? Yes    Interpretation Summary    Left Ventricle: The left ventricle is normal in size. Normal wall thickness. There is normal systolic function with a visually estimated ejection fraction of 55 - 60%. There is normal diastolic function.    Right Ventricle: Normal right ventricular cavity size. Wall thickness is normal. Systolic function is normal.    Left Atrium: Patent foramen ovale visualized with predominant right to left shunting indicated by saline contrast.    Tricuspid Valve: There is mild regurgitation.    IVC/SVC: Normal venous pressure at 3 mmHg.    The patient is status post cardiac transplantation.    Current Heart Failure Medications       Plan  - Monitor strict I&Os and daily weights.    - Place on telemetry  - Low sodium diet  - Place on fluid restriction of 1.5 L.   - Cardiology has been consulted  - The patient's volume status is at their baseline

## 2024-11-12 NOTE — SUBJECTIVE & OBJECTIVE
Past Medical History:   Diagnosis Date    Abdominal wall hernia     CT Renal 6/11/2018---Small fat containing superior ventral abdominal wall hernia at the epicardial pacing lead site.    Abnormal mammogram 10/12/2021    Acute cystitis without hematuria 05/10/2022    Acute ischemic right middle cerebral artery (MCA) stroke 07/20/2024    GRACE (acute kidney injury) 11/22/2021    Anxiety     Aphasia 07/19/2024    Arthritis     ZEN HIPS    Breast cancer in female 08/2021    LEFT BREAST    Bronchitis 08/18/2016    Never smoked      C. difficile colitis 11/29/2021    Cellulitis of axilla, left 12/23/2021    Chronic diastolic heart failure 12/16/2021    Chronic kidney disease     stage 4, GFR 15-29 ml/min    Chronic midline low back pain without sciatica 06/18/2018    Closed nondisplaced fracture of distal phalanx of left great toe with routine healing 10/22/2018    Coronary artery disease 1993    heart transplant    COVID-19 in immunocompromised patient 02/26/2024    Cystitis 05/10/2022    Depression     Encounter for blood transfusion     Encounter for palliative care 10/14/2024    Fibromyalgia     on Lyrica    Heart failure     native heart cardiomyopathy    Heart transplanted 1993    due to cardiomyopathy    History of hyperparathyroidism; Hyperparathyroidism, secondary renal     PT DENIES    Hypertension     Immune disorder     anti rejection meds    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    Impaired mobility 07/28/2022    Iron deficiency anemia 08/15/2017    Kidney stones     passed per pt    Obesity     Obesity (BMI 30.0-34.9) 07/22/2019    Other osteoporosis without current pathological fracture 08/30/2019    Other pancytopenia 05/06/2024    Parotitis, acute 09/19/2023    Pharyngitis 01/09/2019    Pneumonia due to infectious organism 03/14/2024    PONV (postoperative nausea and vomiting)     Severe sepsis 11/22/2021    Shingles 2003 approx    left leg    Subclinical hypothyroidism 06/16/2023     Thrombocytopenia, unspecified 11/29/2021    Trouble in sleeping     Urinary incontinence        Past Surgical History:   Procedure Laterality Date    bladder implant Right 06/11/2024    BLADDER SURGERY  2015 approx    mesh - Dr Everett then 2nd reconstructive sx Dr Onofre    BREAST BIOPSY Bilateral     NEGATIVE    BREAST BIOPSY Right 10/31/2022    benign    BREAST LUMPECTOMY Left 2021    BREAST SURGERY Left 09/28/2015    Bx - benign    BREAST SURGERY Right 12/2015    Bx benign    CARDIAC PACEMAKER REMOVAL Left 06/26/2014    Pacer defirillator removed. Put in 1993 aat time of heart transplant    CARPAL TUNNEL RELEASE Left 03/03/2015    Dr. Hall    COLONOSCOPY N/A 02/25/2021    Procedure: COLONOSCOPY;  Surgeon: Freida Ramirez MD;  Location: [facility] ENDO;  Service: Endoscopy;  Laterality: N/A;    CYSTOCELE REPAIR      Twice with mesh removal    EPIDURAL STEROID INJECTION INTO CERVICAL SPINE N/A 02/02/2023    Procedure: T11/T12 IL HELLEN;  Surgeon: Jassi Pierre MD;  Location: [facility] PAIN MGT;  Service: Pain Management;  Laterality: N/A;    EPIDURAL STEROID INJECTION INTO CERVICAL SPINE N/A 9/16/2024    Procedure: T11/T12 IL HELLEN;  Surgeon: Jassi Pierre MD;  Location: [facility] PAIN MGT;  Service: Pain Management;  Laterality: N/A;    HEART TRANSPLANT  1993    HERNIA REPAIR Right 1971 approx    Inguinal    HYSTERECTOMY  1983    vag hyst /LSO     IMPLANTATION OF PERMANENT SACRAL NERVE STIMULATOR N/A 06/11/2024    Procedure: INSERTION, NEUROSTIMULATOR, PERMANENT, SACRAL;  Surgeon: Sami Cochran MD;  Location: [facility] OR;  Service: Urology;  Laterality: N/A;    INCISION AND DRAINAGE OF ABSCESS Left 12/24/2021    Procedure: INCISION AND DRAINAGE, ABSCESS;  Surgeon: Joseph Longo MD;  Location: [facility] OR;  Service: General;  Laterality: Left;    INJECTION OF ANESTHETIC AGENT AROUND MEDIAL BRANCH NERVES INNERVATING LUMBAR FACET JOINT Right 10/19/2022    Procedure: Right L4/L5 and L5/S1 MBB;  Surgeon: Jassi  MD Ignacio;  Location: [facility] PAIN MGT;  Service: Pain Management;  Laterality: Right;    INJECTION OF ANESTHETIC AGENT AROUND MEDIAL BRANCH NERVES INNERVATING LUMBAR FACET JOINT Right 11/09/2022    Procedure: Right L4/L5 and L5/S1 MBB;  Surgeon: Jassi Pierre MD;  Location: [facility] PAIN MGT;  Service: Pain Management;  Laterality: Right;    INJECTION OF ANESTHETIC AGENT INTO SACROILIAC JOINT Right 08/22/2022    Procedure: Right SIJ Injection Right L5/S1 Facte Injection;  Surgeon: Jassi Pierre MD;  Location: [facility] PAIN MGT;  Service: Pain Management;  Laterality: Right;    INSERTION OF TUNNELED CENTRAL VENOUS CATHETER (CVC) WITH SUBCUTANEOUS PORT N/A 11/09/2021    Procedure: [id number];  Surgeon: Christoph Douglas MD;  Location: [facility] OR;  Service: General;  Laterality: N/A;    RADIOFREQUENCY THERMOCOAGULATION Right 12/07/2022    Procedure: Right L4/L5 and L5/S1 Lumbar RFA;  Surgeon: Jassi Pierre MD;  Location: [facility] PAIN MGT;  Service: Pain Management;  Laterality: Right;    REMOVAL OF VASCULAR ACCESS PORT      ROBOT-ASSISTED LAPAROSCOPIC ABDOMINAL SACROCOLPOPEXY N/A 08/10/2023    Procedure: ROBOTIC SACROCOLPOPEXY, ABDOMEN;  Surgeon: PRANAY Villalobos MD;  Location: [facility] OR;  Service: OB/GYN;  Laterality: N/A;    ROBOT-ASSISTED LAPAROSCOPIC OOPHORECTOMY Right 08/10/2023    Procedure: ROBOTIC OOPHORECTOMY;  Surgeon: PRANAY Villalobos MD;  Location: [facility] OR;  Service: OB/GYN;  Laterality: Right;    SENTINEL LYMPH NODE BIOPSY Left 10/12/2021    Procedure: BIOPSY, LYMPH NODE, SENTINEL;  Surgeon: Christoph Douglas MD;  Location: [facility] OR;  Service: General;  Laterality: Left;    TOE SURGERY      XI ROBOTIC URETHROPEXY N/A 08/10/2023    Procedure: XI ROBOTIC URETHROPEXY;  Surgeon: PRANAY Villalobos MD;  Location: [facility] OR;  Service: OB/GYN;  Laterality: N/A;       Review of patient's allergies indicates:   Allergen Reactions    Lisinopril Swelling and Rash    Nitro Shortness Of Breath     Audible  wheezing     Hydrocodone-acetaminophen Nausea Only    Augmentin [amoxicillin-pot clavulanate] Diarrhea    Zyvox [linezolid] Nausea And Vomiting       No current facility-administered medications on file prior to encounter.     Current Outpatient Medications on File Prior to Encounter   Medication Sig    aspirin (ECOTRIN) 81 MG EC tablet Take 1 tablet (81 mg total) by mouth once daily.    calcitRIOL (ROCALTROL) 0.25 MCG Cap Take 1 capsule (0.25 mcg total) by mouth once daily.    carvediloL (COREG) 3.125 MG tablet Take 1 tablet (3.125 mg total) by mouth 2 (two) times daily with meals. Hold parameters: SBP less than 110 and/or DBP less than 75    cycloSPORINE modified, NEORAL, (NEORAL) 25 MG capsule Take 3 capsules (75 mg total) by mouth 2 (two) times daily.    furosemide (LASIX) 20 MG tablet Take 2 tablets (40 mg total) by mouth once daily. HOLD UNTIL FOLLOW UP WITH PCP    ondansetron (ZOFRAN) 4 MG tablet Take 4 mg by mouth as needed for Nausea.    pantoprazole (PROTONIX) 20 MG tablet TAKE 1 TABLET ONE TIME DAILY    patiromer calcium sorbitex (VELTASSA) 8.4 gram PwPk Take 1 packet (8.4 g total) by mouth once daily    pregabalin (LYRICA) 25 MG capsule Take 1 capsule (25 mg total) by mouth every evening.    sodium bicarbonate 650 MG tablet Take 1 tablet (650 mg total) by mouth 2 (two) times daily.    temazepam (RESTORIL) 30 mg capsule Take 1 capsule (30 mg total) by mouth nightly as needed for Insomnia. FOR INSOMNIA    tiZANidine 4 mg Cap Take 2 mg by mouth nightly as needed (muscle spasm).    vitamin E 400 UNIT capsule Take 400 Units by mouth once daily.    zolpidem (AMBIEN) 5 MG Tab Take 1 tablet (5 mg total) by mouth every evening.    polyethylene glycol (GLYCOLAX) 17 gram PwPk Take 17 g by mouth once daily.    [DISCONTINUED] hydrALAZINE (APRESOLINE) 25 MG tablet Take 25 mg by mouth every 8 (eight) hours. HOLD hydralazine for now; may restart if BP is elevated (Patient not taking: Reported on 10/17/2024)     Family  History       Problem Relation (Age of Onset)    Breast cancer Mother, Maternal Grandmother    Cancer Mother (38)    Cataracts Cousin    Heart disease Maternal Grandmother    Hypertension Son          Tobacco Use    Smoking status: Never     Passive exposure: Never    Smokeless tobacco: Never   Substance and Sexual Activity    Alcohol use: Never     Alcohol/week: 0.0 standard drinks of alcohol    Drug use: No    Sexual activity: Not Currently     Partners: Male     Birth control/protection: See Surgical Hx     Review of Systems   Constitutional:  Positive for activity change and fatigue.   Respiratory:  Positive for chest tightness and shortness of breath.    Neurological:  Positive for weakness.   Psychiatric/Behavioral:  The patient is nervous/anxious.      Objective:     Vital Signs (Most Recent):  Temp: 99 °F (37.2 °C) (11/12/24 1550)  Pulse: 93 (11/12/24 1550)  Resp: 16 (11/12/24 1550)  BP: 137/80 (11/12/24 1651)  SpO2: 99 % (11/12/24 1550) Vital Signs (24h Range):  Temp:  [97.5 °F (36.4 °C)-99 °F (37.2 °C)] 99 °F (37.2 °C)  Pulse:  [83-96] 93  Resp:  [16-26] 16  SpO2:  [97 %-100 %] 99 %  BP: (137-192)/(70-96) 137/80     Weight: 67.4 kg (148 lb 9.4 oz)  Body mass index is 27.18 kg/m².     Physical Exam  Vitals and nursing note reviewed.   Constitutional:       General: She is not in acute distress.     Appearance: She is well-developed.   HENT:      Head: Normocephalic and atraumatic.      Right Ear: Hearing and external ear normal.      Left Ear: Hearing and external ear normal.      Nose: No rhinorrhea.      Right Sinus: No maxillary sinus tenderness or frontal sinus tenderness.      Left Sinus: No maxillary sinus tenderness or frontal sinus tenderness.      Mouth/Throat:      Mouth: No oral lesions.      Pharynx: Uvula midline.   Eyes:      General:         Right eye: No discharge.         Left eye: No discharge.      Conjunctiva/sclera: Conjunctivae normal.      Pupils: Pupils are equal, round, and reactive  to light.   Neck:      Thyroid: No thyromegaly.      Vascular: No carotid bruit.      Trachea: No tracheal deviation.   Cardiovascular:      Rate and Rhythm: Normal rate and regular rhythm.      Pulses:           Dorsalis pedis pulses are 2+ on the right side and 2+ on the left side.      Heart sounds: Normal heart sounds, S1 normal and S2 normal. No murmur heard.  Pulmonary:      Effort: Pulmonary effort is normal. No respiratory distress.      Breath sounds: Normal breath sounds.   Abdominal:      General: Bowel sounds are normal. There is no distension.      Palpations: Abdomen is soft. There is no mass.      Tenderness: There is no abdominal tenderness.   Musculoskeletal:         General: Normal range of motion.      Cervical back: Normal range of motion.   Lymphadenopathy:      Cervical: No cervical adenopathy.      Upper Body:      Right upper body: No supraclavicular adenopathy.      Left upper body: No supraclavicular adenopathy.   Skin:     General: Skin is warm and dry.      Capillary Refill: Capillary refill takes less than 2 seconds.      Findings: No rash.   Neurological:      Mental Status: She is alert and oriented to person, place, and time.      Sensory: No sensory deficit.      Coordination: Coordination normal.      Gait: Gait normal.   Psychiatric:         Mood and Affect: Mood is anxious. Mood is not depressed.         Speech: Speech is rapid and pressured.         Behavior: Behavior normal.         Thought Content: Thought content normal.         Judgment: Judgment normal.              CRANIAL NERVES     CN III, IV, VI   Pupils are equal, round, and reactive to light.       Significant Labs: All pertinent labs within the past 24 hours have been reviewed.  CBC:   Recent Labs   Lab 11/12/24  1216   WBC 3.12*   HGB 10.1*   HCT 30.9*        CMP:   Recent Labs   Lab 11/12/24  1216      K 4.2      CO2 22*   GLU 92   BUN 59*   CREATININE 4.1*   CALCIUM 9.3   PROT 8.3   ALBUMIN 3.8    BILITOT 0.5   ALKPHOS 140   AST 50*   ALT 32   ANIONGAP 14     Cardiac Markers:   Recent Labs   Lab 11/12/24  1216   *     Troponin:   Recent Labs   Lab 11/12/24  1216   TROPONINI 0.019       Significant Imaging: I have reviewed all pertinent imaging results/findings within the past 24 hours.

## 2024-11-12 NOTE — ASSESSMENT & PLAN NOTE
Felt secondary to adverse drug reaction from dobutamine and nitro.  Patient has history of heart transplant/denervated so will not experience typical angina.  Initial trop negative    --trend troponin  --continue home medications  --transplant team was contacted and is aware of admission

## 2024-11-12 NOTE — ASSESSMENT & PLAN NOTE
-BNP higher than baseline  -Received IV Lasix in ED, assess response  -Continue other home CV meds

## 2024-11-12 NOTE — ASSESSMENT & PLAN NOTE
-Likely due to adverse drug reaction from dobutamine stress/nitro allergy  -Improved during exam  -Patient with history of heart transplant/denervated so will not experience typical angina  -Initial troponin negative, repeat pending  -EKG reviewed, no acute ischemic changes noted, chronic RBBB  -Continue OMT  -Transplant team aware of admission

## 2024-11-12 NOTE — ASSESSMENT & PLAN NOTE
-Likely medication reaction due to dobutamine/nitro allergy  -Improved since admission, received steroids, racemic epinephrine  -Agree with dose of IV Lasix

## 2024-11-12 NOTE — ASSESSMENT & PLAN NOTE
Creatine stable for now. BMP reviewed- noted Estimated Creatinine Clearance: 11.5 mL/min (A) (based on SCr of 4.1 mg/dL (H)). according to latest data. Based on current GFR, CKD stage is stage 4 - GFR 15-29.  Monitor UOP and serial BMP and adjust therapy as needed. Renally dose meds. Avoid nephrotoxic medications and procedures.

## 2024-11-12 NOTE — ED PROVIDER NOTES
SCRIBE #1 NOTE: I, Linda Casiano, am scribing for, and in the presence of, Leana Deshpande MD. I have scribed the entire note.       History     Chief Complaint   Patient presents with    Chest Pain     Patient presents to ED from Cardiology clinic with c/o chest pain and SOB that started during stress test. History of heart transplant.      Review of patient's allergies indicates:   Allergen Reactions    Lisinopril Swelling and Rash    Nitro Shortness Of Breath     Audible wheezing     Hydrocodone-acetaminophen Nausea Only    Augmentin [amoxicillin-pot clavulanate] Diarrhea    Zyvox [linezolid] Nausea And Vomiting         History of Present Illness     HPI    11/12/2024, 12:12 PM  History obtained from EMS and the patient      History of Present Illness: Nadia Damon is a 69 y.o. female patient with a PMHx of HTN, heart transplant, depression, anxiety, obesity, anemia, heart failure, CKD, CAD, blood transfusion, and ischemic stroke who presents to the Emergency Department for evaluation of CP which onset suddenly while having a nuclear stress test at cardiology clinic this morning. Per EMS, pt started having CP during her stress test. They report pt had seizure-like activity, tremors, and was not responding to her name. Pt notes she has had a nuclear stress test before with no reaction. Pt states she took her BP medications this morning. She is on ASA but has not taken it today.  Symptoms are constant and moderate in severity. No mitigating or exacerbating factors reported. Associated sxs include SOB, wheezing Prior Tx includes 2 nitroglycerin tablets and duoneb with no improvement of symptoms. Pt states that at clinic, she was given a tablet and told to chew it; she is unsure of what it was. Pt states she has received nitroglycerin before, which has caused her to have SOB. No further complaints or concerns at this time.       Arrival mode: EMS    PCP: Elis Wick MD        Past Medical History:  Past  Medical History:   Diagnosis Date    Abdominal wall hernia     CT Renal 6/11/2018---Small fat containing superior ventral abdominal wall hernia at the epicardial pacing lead site.    Abnormal mammogram 10/12/2021    Acute cystitis without hematuria 05/10/2022    Acute ischemic right middle cerebral artery (MCA) stroke 07/20/2024    GRACE (acute kidney injury) 11/22/2021    Anxiety     Aphasia 07/19/2024    Arthritis     ZEN HIPS    Breast cancer in female 08/2021    LEFT BREAST    Bronchitis 08/18/2016    Never smoked      C. difficile colitis 11/29/2021    Cellulitis of axilla, left 12/23/2021    Chronic diastolic heart failure 12/16/2021    Chronic kidney disease     stage 4, GFR 15-29 ml/min    Chronic midline low back pain without sciatica 06/18/2018    Closed nondisplaced fracture of distal phalanx of left great toe with routine healing 10/22/2018    Coronary artery disease 1993    heart transplant    COVID-19 in immunocompromised patient 02/26/2024    Cystitis 05/10/2022    Depression     Encounter for blood transfusion     Encounter for palliative care 10/14/2024    Fibromyalgia     on Lyrica    Heart failure     native heart cardiomyopathy    Heart transplanted 1993    due to cardiomyopathy    History of hyperparathyroidism; Hyperparathyroidism, secondary renal     PT DENIES    Hypertension     Immune disorder     anti rejection meds    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    Impaired mobility 07/28/2022    Iron deficiency anemia 08/15/2017    Kidney stones     passed per pt    Obesity     Obesity (BMI 30.0-34.9) 07/22/2019    Other osteoporosis without current pathological fracture 08/30/2019    Other pancytopenia 05/06/2024    Parotitis, acute 09/19/2023    Pharyngitis 01/09/2019    Pneumonia due to infectious organism 03/14/2024    PONV (postoperative nausea and vomiting)     Severe sepsis 11/22/2021    Shingles 2003 approx    left leg    Subclinical hypothyroidism 06/16/2023     Thrombocytopenia, unspecified 11/29/2021    Trouble in sleeping     Urinary incontinence        Past Surgical History:  Past Surgical History:   Procedure Laterality Date    bladder implant Right 06/11/2024    BLADDER SURGERY  2015 approx    mesh - Dr Everett then 2nd reconstructive sx Dr Onofre    BREAST BIOPSY Bilateral     NEGATIVE    BREAST BIOPSY Right 10/31/2022    benign    BREAST LUMPECTOMY Left 2021    BREAST SURGERY Left 09/28/2015    Bx - benign    BREAST SURGERY Right 12/2015    Bx benign    CARDIAC PACEMAKER REMOVAL Left 06/26/2014    Pacer defirillator removed. Put in 1993 aat time of heart transplant    CARPAL TUNNEL RELEASE Left 03/03/2015    Dr. Hall    COLONOSCOPY N/A 02/25/2021    Procedure: COLONOSCOPY;  Surgeon: Freida Ramirez MD;  Location: [facility];  Service: Endoscopy;  Laterality: N/A;    CYSTOCELE REPAIR      Twice with mesh removal    EPIDURAL STEROID INJECTION INTO CERVICAL SPINE N/A 02/02/2023    Procedure: T11/T12 IL HELLEN;  Surgeon: Jassi Pierre MD;  Location: [facility] PAIN MGT;  Service: Pain Management;  Laterality: N/A;    EPIDURAL STEROID INJECTION INTO CERVICAL SPINE N/A 9/16/2024    Procedure: T11/T12 IL HELLEN;  Surgeon: Jassi Pierre MD;  Location: [facility]IN [facility];  Service: Pain Management;  Laterality: N/A;    HEART TRANSPLANT  1993    HERNIA REPAIR Right 1971 approx    Inguinal    HYSTERECTOMY  1983    vag hyst /LSO     IMPLANTATION OF PERMANENT SACRAL NERVE STIMULATOR N/A 06/11/2024    Procedure: INSERTION, NEUROSTIMULATOR, PERMANENT, SACRAL;  Surgeon: Sami Cochran MD;  Location: [facility] OR;  Service: Urology;  Laterality: N/A;    INCISION AND DRAINAGE OF ABSCESS Left 12/24/2021    Procedure: INCISION AND DRAINAGE, ABSCESS;  Surgeon: Joseph Longo MD;  Location: [facility] OR;  Service: General;  Laterality: Left;    INJECTION OF ANESTHETIC AGENT AROUND MEDIAL BRANCH NERVES INNERVATING LUMBAR FACET JOINT Right 10/19/2022    Procedure: Right L4/L5 and L5/S1 MBB;   Surgeon: Jassi Pierre MD;  Location: [facility] PAIN MGT;  Service: Pain Management;  Laterality: Right;    INJECTION OF ANESTHETIC AGENT AROUND MEDIAL BRANCH NERVES INNERVATING LUMBAR FACET JOINT Right 11/09/2022    Procedure: Right L4/L5 and L5/S1 MBB;  Surgeon: Jassi Pierre MD;  Location: [facility] PAIN [facility]GT;  Service: Pain Management;  Laterality: Right;    INJECTION OF ANESTHETIC AGENT INTO SACROILIAC JOINT Right 08/22/2022    Procedure: Right SIJ Injection Right L5/S1 Facte Injection;  Surgeon: Jassi Pierre MD;  Location: [facility] PAIN [facility]GT;  Service: Pain Management;  Laterality: Right;    INSERTION OF TUNNELED CENTRAL VENOUS CATHETER (CVC) WITH SUBCUTANEOUS PORT N/A 11/09/2021    Procedure: [id number];  Surgeon: Christoph Douglas MD;  Location: [facility] OR;  Service: General;  Laterality: N/A;    RADIOFREQUENCY THERMOCOAGULATION Right 12/07/2022    Procedure: Right L4/L5 and L5/S1 Lumbar RFA;  Surgeon: Jassi Pierre MD;  Location: [facility] PAIN [facility]GT;  Service: Pain Management;  Laterality: Right;    REMOVAL OF VASCULAR ACCESS PORT      ROBOT-ASSISTED LAPAROSCOPIC ABDOMINAL SACROCOLPOPEXY N/A 08/10/2023    Procedure: ROBOTIC SACROCOLPOPEXY, ABDOMEN;  Surgeon: PRANAY Villalobos MD;  Location: [facility] OR;  Service: OB/GYN;  Laterality: N/A;    ROBOT-ASSISTED LAPAROSCOPIC OOPHORECTOMY Right 08/10/2023    Procedure: ROBOTIC OOPHORECTOMY;  Surgeon: PRANAY Villalobos MD;  Location: [facility] OR;  Service: OB/GYN;  Laterality: Right;    SENTINEL LYMPH NODE BIOPSY Left 10/12/2021    Procedure: BIOPSY, LYMPH NODE, SENTINEL;  Surgeon: Christoph oDuglas MD;  Location: [facility] OR;  Service: General;  Laterality: Left;    TOE SURGERY      XI ROBOTIC URETHROPEXY N/A 08/10/2023    Procedure: XI ROBOTIC URETHROPEXY;  Surgeon: PRANAY Villalobos MD;  Location: [facility] OR;  Service: OB/GYN;  Laterality: N/A;         Family History:  Family History   Problem Relation Name Age of Onset    Cancer Mother  38        breast    Breast cancer  Mother      Breast cancer Maternal Grandmother      Heart disease Maternal Grandmother      Hypertension Son      Cataracts Cousin      Diabetes Neg Hx      Stroke Neg Hx      Kidney disease Neg Hx      Asthma Neg Hx      COPD Neg Hx      Melanoma Neg Hx      Hyperlipidemia Neg Hx         Social History:  Social History     Tobacco Use    Smoking status: Never     Passive exposure: Never    Smokeless tobacco: Never   Substance and Sexual Activity    Alcohol use: Never     Alcohol/week: 0.0 standard drinks of alcohol    Drug use: No    Sexual activity: Not Currently     Partners: Male     Birth control/protection: See Surgical Hx        Review of Systems     Review of Systems   Constitutional:  Negative for fever.   HENT:  Negative for sore throat.    Respiratory:  Positive for shortness of breath, wheezing and stridor.    Cardiovascular:  Positive for chest pain.   Gastrointestinal:  Negative for nausea.   Genitourinary:  Negative for dysuria.   Musculoskeletal:  Negative for back pain.   Skin:  Negative for rash.   Neurological:  Positive for tremors. Negative for weakness and headaches.   Hematological:  Does not bruise/bleed easily.   All other systems reviewed and are negative.       Physical Exam     Initial Vitals [11/12/24 1138]   BP Pulse Resp Temp SpO2   (!) 147/87 88 (!) 22 97.5 °F (36.4 °C) 100 %      MAP       --          Physical Exam  Nursing Notes and Vital Signs Reviewed.  Constitutional: Patient is in obvious distress. Well-developed and well-nourished.  Head: Atraumatic. Normocephalic.   Eyes: PERRL. EOM intact. Conjunctivae are not pale. No scleral icterus.  ENT: Mucous membranes are moist. Oropharynx is clear and symmetric. No tongue swelling. No facial or lip swelling, airway patent.   Neck: Supple. Full ROM. No lymphadenopathy.  Cardiovascular: Regular rate. Regular rhythm. No murmurs, rubs, or gallops. Distal pulses are 2+ and symmetric.  Pulmonary/Chest: Tachypneic. Inspiratory and expiratory  wheezing. Increase work of breathing. Stridorous.   Abdominal: Soft and non-distended.  There is no tenderness.  No rebound, guarding, or rigidity. Good bowel sounds.  Musculoskeletal: Moves all extremities. No obvious deformities. No edema. No calf tenderness.  Skin: Warm and dry. No facial swelling. No rash.   Neurological:  Alert, awake, and appropriate.  Unable to complete full sentences due to resp distress.  No acute focal neurological deficits are appreciated. Responding to appropriate commands. Restless.  Psychiatric: Good eye contact. Appropriate in content. Anxious.      ED Course   Critical Care    Date/Time: 11/12/2024 2:00 PM    Performed by: Leana Deshpande MD  Authorized by: Leana Deshpande MD  Direct patient critical care time: 50 minutes  Additional history critical care time: 8 minutes  Ordering / reviewing critical care time: 8 minutes  Documentation critical care time: 7 minutes  Consulting other physicians critical care time: 10 minutes  Total critical care time (exclusive of procedural time) : 83 minutes  Critical care was necessary to treat or prevent imminent or life-threatening deterioration of the following conditions: respiratory failure, cardiac failure, circulatory failure and CNS failure or compromise.  Critical care was time spent personally by me on the following activities: blood draw for specimens, development of treatment plan with patient or surrogate, discussions with consultants, discussions with primary provider, interpretation of cardiac output measurements, evaluation of patient's response to treatment, obtaining history from patient or surrogate, examination of patient, ordering and review of laboratory studies, ordering and performing treatments and interventions, pulse oximetry, ordering and review of radiographic studies, re-evaluation of patient's condition and review of old charts.        ED Vital Signs:  Vitals:    11/12/24 1200 11/12/24 1210 11/12/24 1215 11/12/24  1250   BP: (!) 192/96  (!) 182/95 (!) 186/90   Pulse: 93  85 87   Resp: (!) 22 (!) 22 (!) 22 (!) 22   Temp: 97.5 °F (36.4 °C)      TempSrc: Oral      SpO2: 99%  97% 99%   Weight:       Height:        11/12/24 1301 11/12/24 1330 11/12/24 1400 11/12/24 1430   BP: (!) 164/70 (!) 166/81 (!) 164/83 (!) 182/92   Pulse: 89 96 94 93   Resp: (!) 22 (!) 22 (!) 26 (!) 22   Temp:       TempSrc:       SpO2: 99% 99% 97% 99%   Weight:       Height:        11/12/24 1550 11/12/24 1651 11/12/24 1700 11/12/24 1836   BP: 137/80 137/80     Pulse: 93  97    Resp: 16   17   Temp: 99 °F (37.2 °C)      TempSrc: Oral      SpO2: 99%      Weight:       Height:        11/12/24 1850 11/12/24 1949 11/12/24 2000   BP:  (!) 110/58    Pulse: (!) 136 97 102   Resp: (!) 24 16    Temp:  99 °F (37.2 °C)    TempSrc:  Oral    SpO2: 100% 98%    Weight:      Height:          Abnormal Lab Results:  Labs Reviewed   CBC W/ AUTO DIFFERENTIAL - Abnormal       Result Value    WBC 3.12 (*)     RBC 3.49 (*)     Hemoglobin 10.1 (*)     Hematocrit 30.9 (*)     MCV 89      MCH 28.9      MCHC 32.7      RDW 15.0 (*)     Platelets 223      MPV 10.0      Immature Granulocytes 0.3      Gran # (ANC) 1.4 (*)     Immature Grans (Abs) 0.01      Lymph # 1.0      Mono # 0.5      Eos # 0.2      Baso # 0.02      nRBC 0      Gran % 45.6      Lymph % 31.7      Mono % 15.1 (*)     Eosinophil % 6.7      Basophil % 0.6      Differential Method Automated     COMPREHENSIVE METABOLIC PANEL - Abnormal    Sodium 142      Potassium 4.2      Chloride 106      CO2 22 (*)     Glucose 92      BUN 59 (*)     Creatinine 4.1 (*)     Calcium 9.3      Total Protein 8.3      Albumin 3.8      Total Bilirubin 0.5      Alkaline Phosphatase 140      AST 50 (*)     ALT 32      eGFR 11 (*)     Anion Gap 14     B-TYPE NATRIURETIC PEPTIDE - Abnormal     (*)    TSH - Abnormal    TSH 10.030 (*)    TROPONIN I    Troponin I 0.019     MAGNESIUM    Magnesium 2.1     PROTIME-INR    Prothrombin Time 10.7       INR 1.0     APTT    aPTT 28.3     T4, FREE    Free T4 1.02          All Lab Results:  Results for orders placed or performed during the hospital encounter of 11/12/24   EKG 12-lead    Collection Time: 11/12/24 11:46 AM   Result Value Ref Range    QRS Duration 152 ms    [facility] QTC Calculation 567 ms   CBC auto differential    Collection Time: 11/12/24 12:16 PM   Result Value Ref Range    WBC 3.12 (L) 3.90 - 12.70 K/uL    RBC 3.49 (L) 4.00 - 5.40 M/uL    Hemoglobin 10.1 (L) 12.0 - 16.0 g/dL    Hematocrit 30.9 (L) 37.0 - 48.5 %    MCV 89 82 - 98 fL    MCH 28.9 27.0 - 31.0 pg    MCHC 32.7 32.0 - 36.0 g/dL    RDW 15.0 (H) 11.5 - 14.5 %    Platelets 223 150 - 450 K/uL    MPV 10.0 9.2 - 12.9 fL    Immature Granulocytes 0.3 0.0 - 0.5 %    Gran # (ANC) 1.4 (L) 1.8 - 7.7 K/uL    Immature Grans (Abs) 0.01 0.00 - 0.04 K/uL    Lymph # 1.0 1.0 - 4.8 K/uL    Mono # 0.5 0.3 - 1.0 K/uL    Eos # 0.2 0.0 - 0.5 K/uL    Baso # 0.02 0.00 - 0.20 K/uL    nRBC 0 0 /100 WBC    Gran % 45.6 38.0 - 73.0 %    Lymph % 31.7 18.0 - 48.0 %    Mono % 15.1 (H) 4.0 - 15.0 %    Eosinophil % 6.7 0.0 - 8.0 %    Basophil % 0.6 0.0 - 1.9 %    Differential Method Automated    Comprehensive metabolic panel    Collection Time: 11/12/24 12:16 PM   Result Value Ref Range    Sodium 142 136 - 145 mmol/L    Potassium 4.2 3.5 - 5.1 mmol/L    Chloride 106 95 - 110 mmol/L    CO2 22 (L) 23 - 29 mmol/L    Glucose 92 70 - 110 mg/dL    BUN 59 (H) 8 - 23 mg/dL    Creatinine 4.1 (H) 0.5 - 1.4 mg/dL    Calcium 9.3 8.7 - 10.5 mg/dL    Total Protein 8.3 6.0 - 8.4 g/dL    Albumin 3.8 3.5 - 5.2 g/dL    Total Bilirubin 0.5 0.1 - 1.0 mg/dL    Alkaline Phosphatase 140 40 - 150 U/L    AST 50 (H) 10 - 40 U/L    ALT 32 10 - 44 U/L    eGFR 11 (A) >60 mL/min/1.73 m^2    Anion Gap 14 8 - 16 mmol/L   Troponin I #1    Collection Time: 11/12/24 12:16 PM   Result Value Ref Range    Troponin I 0.019 0.000 - 0.026 ng/mL   BNP    Collection Time: 11/12/24 12:16 PM   Result Value Ref Range    BNP  764 (H) 0 - 99 pg/mL   Magnesium    Collection Time: 11/12/24 12:16 PM   Result Value Ref Range    Magnesium 2.1 1.6 - 2.6 mg/dL   TSH    Collection Time: 11/12/24 12:16 PM   Result Value Ref Range    TSH 10.030 (H) 0.400 - 4.000 uIU/mL   Protime-INR    Collection Time: 11/12/24 12:16 PM   Result Value Ref Range    Prothrombin Time 10.7 9.0 - 12.5 sec    INR 1.0 0.8 - 1.2   APTT    Collection Time: 11/12/24 12:16 PM   Result Value Ref Range    aPTT 28.3 21.0 - 32.0 sec   T4, Free    Collection Time: 11/12/24 12:16 PM   Result Value Ref Range    Free T4 1.02 0.71 - 1.51 ng/dL   EKG 12-lead    Collection Time: 11/12/24  1:03 PM   Result Value Ref Range    QRS Duration 160 ms    [facility]HS QTC Calculation 569 ms   Troponin I #2    Collection Time: 11/12/24  6:08 PM   Result Value Ref Range    Troponin I 0.055 (H) 0.000 - 0.026 ng/mL   POCT glucose    Collection Time: 11/12/24  6:49 PM   Result Value Ref Range    POCT Glucose 210 (H) 70 - 110 mg/dL   Troponin I    Collection Time: 11/12/24  9:53 PM   Result Value Ref Range    Troponin I 0.059 (H) 0.000 - 0.026 ng/mL     *Note: Due to a large number of results and/or encounters for the requested time period, some results have not been displayed. A complete set of results can be found in Results Review.         Imaging Results:  Imaging Results              CT Head Without Contrast (Final result)  Result time 11/12/24 13:02:37      Final result by Jackie Reyes MD (Timothy) (11/12/24 13:02:37)                   Impression:      No acute intracranial abnormality.    All CT scans at this facility use dose modulation, iterative reconstructions, and/or weight base dosing when appropriate to reduce radiation dose to as low as reasonably achievable.      Electronically signed by: Jackie Reyes MD  Date:    11/12/2024  Time:    13:02               Narrative:    EXAMINATION:  CT HEAD WITHOUT CONTRAST    CLINICAL HISTORY:  Seizure, new-onset, no history of  trauma;    TECHNIQUE:  Noncontrast images were obtained    COMPARISON:  CT brain 07/25/2024.    FINDINGS:  No intracranial acute hemorrhage or acute focal brain parenchymal abnormality is identified.  Calvarium is intact.                                       X-Ray Chest AP Portable (Final result)  Result time 11/12/24 12:24:52      Final result by Jackie Reyes MD (Timothy) (11/12/24 12:24:52)                   Impression:      No infiltrates      Electronically signed by: Jackie Reyes MD  Date:    11/12/2024  Time:    12:24               Narrative:    EXAMINATION:  XR CHEST AP PORTABLE    CLINICAL HISTORY:  , Chest Pain;    COMPARISON:  None    FINDINGS:  One view.  Prior median sternotomy.  Stable mild cardiomegaly.  Clear lungs.                                       The EKG was ordered, reviewed, and independently interpreted by the ED provider.  Interpretation time: 11:46  Rate: 99 BPM  Rhythm: sinus rhythm with marked sinus arrhythmia  Interpretation: Left axis deviation. Right bundle branch block. T wave abnormality, consider inferior ischemia. No STEMI.    The EKG was ordered, reviewed, and independently interpreted by the ED provider.  Interpretation time: 13:03  Rate: 89 BPM  Rhythm: normal sinus rhythm  Interpretation: Possible left atrial enlargement. Left axis deviation. Right bundle branch block. Left ventricular hypertrophy. T wave abnormality, consider inferior ischemia. No STEMI.               The Emergency Provider reviewed the vital signs and test results, which are outlined above.     ED Discussion     12:45 PM: After patient received racemic epinephrine, Solu-Medrol, and Pepcid, along with ativan, her symptoms completely resolved. Patient no longer stridorous, wheezing. No increase work of breathing. Patient speaking in full sentences. No obvious distress.     2:08 PM: Discussed case with Tanisha Alvarez NP (Hospital Medicine). Dr. Malin agrees with current care and management of pt and  accepts admission.   Admitting Service:   Admitting Physician: Dr. Malin  Admit to: OBS Tele     2:08 PM: Re-evaluated pt. I have discussed test results, shared treatment plan, and the need for admission with patient and family at bedside. Pt and family express understanding at this time and agree with all information. All questions answered. Pt and family have no further questions or concerns at this time. Pt is ready for admit.        Medical Decision Making  DDX: 1. ACS 2. Allergic Reaction 3. HTN Emergency 4. TIA    ECG reviewed x 2 and no acute ischemic changes, CT head and CXR negative, lab work reviewed and labs are at baseline, trop negative, BNP chronically elevated, kidney function at baseline, stridor, resp distress, wheezing after tx in the ER, unclear etiology if related to nitroglycerine or meds used for nuclear stress test, case discussed with cardiology, transplant, hospital med to admit.     Amount and/or Complexity of Data Reviewed  External Data Reviewed: ECG.     Details: Reviewed ECG from 9/11/24. No change.   Labs: ordered. Decision-making details documented in ED Course.  Radiology: ordered. Decision-making details documented in ED Course.  ECG/medicine tests: ordered and independent interpretation performed. Decision-making details documented in ED Course.  Discussion of management or test interpretation with external provider(s): Discussed case with Erik Calloway MD (Interventional Cardiology). States transplant patients are denervated so won't have typical angina. States probably pulmonary or allergy as mentioned. Recommends steroids, nebs, and pain meds. Dr. Calloway recommends admit to hospital medicine overnight.   Discussed case with Courtney Tubbs MD (Cardiology). States patient can stay here.  Spoke with Tanisha Alvarez NP (). Admit patient to Dr. Malin () OBS Tele.      Risk  OTC drugs.  Prescription drug management.  Decision regarding hospitalization.  Diagnosis or treatment  significantly limited by social determinants of health.                ED Medication(s):  Medications   racepinephrine 2.25 % nebulizer solution 0.5 mL (0.5 mLs Nebulization Given 11/12/24 1156)   sodium chloride 0.9% flush 10 mL (has no administration in time range)   ondansetron injection 4 mg (4 mg Intravenous Given 11/12/24 1552)   prochlorperazine injection Soln 5 mg (has no administration in time range)   polyethylene glycol packet 17 g (has no administration in time range)   aluminum-magnesium hydroxide-simethicone 200-200-20 mg/5 mL suspension 30 mL (has no administration in time range)   acetaminophen tablet 650 mg (has no administration in time range)   naloxone 0.4 mg/mL injection 0.02 mg (has no administration in time range)   glucose chewable tablet 16 g (has no administration in time range)   glucose chewable tablet 24 g (has no administration in time range)   glucagon (human recombinant) injection 1 mg (has no administration in time range)   dextrose 10% bolus 125 mL 125 mL (has no administration in time range)   dextrose 10% bolus 250 mL 250 mL (has no administration in time range)   enoxaparin injection 30 mg (30 mg Subcutaneous Given 11/12/24 1651)   aspirin EC tablet 81 mg (has no administration in time range)   calcitRIOL capsule 0.25 mcg (has no administration in time range)   carvediloL tablet 3.125 mg (3.125 mg Oral Given 11/12/24 1651)   pregabalin capsule 25 mg (25 mg Oral Given 11/12/24 2101)   sodium bicarbonate tablet 650 mg (650 mg Oral Given 11/12/24 2101)   zolpidem tablet 5 mg (5 mg Oral Given 11/12/24 2101)   morphine injection 4 mg (4 mg Intravenous Given 11/12/24 1836)   diphenhydrAMINE injection 25 mg (has no administration in time range)   ALPRAZolam tablet 0.25 mg (has no administration in time range)   cycloSPORINE modified (NEORAL) (NEORAL) capsule 75 mg (75 mg Oral Given 11/12/24 2143)   albuterol-ipratropium 2.5 mg-0.5 mg/3 mL nebulizer solution 3 mL (3 mLs Nebulization  Given 11/12/24 1154)   famotidine (PF) injection 20 mg (20 mg Intravenous Given 11/12/24 1150)   methylPREDNISolone sodium succinate injection 125 mg (125 mg Intravenous Given 11/12/24 1150)   LORazepam injection 1 mg (1 mg Intravenous Given 11/12/24 1214)   morphine injection 4 mg (4 mg Intravenous Given 11/12/24 1210)   ondansetron injection 4 mg (4 mg Intravenous Given 11/12/24 1210)   morphine injection 4 mg (4 mg Intravenous Given 11/12/24 1301)   ondansetron injection 4 mg (4 mg Intravenous Given 11/12/24 1300)   ALPRAZolam tablet 0.25 mg (0.25 mg Oral Given 11/12/24 1353)   diphenhydrAMINE injection 25 mg (25 mg Intravenous Given 11/12/24 1355)   furosemide injection 80 mg (80 mg Intravenous Given 11/12/24 1356)   LORazepam injection 1 mg (1 mg Intravenous Given 11/12/24 1856)       Current Discharge Medication List                  Scribe Attestation:   Scribe #1: I performed the above scribed service and the documentation accurately describes the services I performed. I attest to the accuracy of the note.     Attending:   Physician Attestation Statement for Scribe #1: I, Leana Deshpande MD, personally performed the services described in this documentation, as scribed by Linda Casiano, in my presence, and it is both accurate and complete.           Clinical Impression       ICD-10-CM ICD-9-CM   1. Allergic reaction, initial encounter  T78.40XA 995.3   2. Chest pain  R07.9 786.50   3. Wheezing  R06.2 786.07   4. Chronic hypertension  I10 401.9   5. History of heart transplant  Z94.1 V42.1   6. Anxiety about health  R45.89 799.29   7. CKD (chronic kidney disease) stage 4, GFR 15-29 ml/min  N18.4 585.4   8. Chronic combined systolic and diastolic heart failure  I50.42 428.42   9. Immunocompromised state due to drug therapy  D84.821 V58.69    Z79.899        Disposition:   Disposition: Admitted  Condition: Fair         Leana Deshpande MD  11/12/24 1979

## 2024-11-12 NOTE — ASSESSMENT & PLAN NOTE
S/P transplant in 1991, transplant team contacted, aware of admission.  Patient managed with cyclosporine    .--cyclosporine not available on formulary

## 2024-11-12 NOTE — ASSESSMENT & PLAN NOTE
Patient undergoing nuclear stress test, developed chest pain/chest discomfort following dobutamine stress test.  Patient then given nitro and became short of breath with wheezing, seizure-like activity.    --new allergy nitroglycerin  --tele  --vitals q.4  --diphenhydramine 25 mg IV q.6 p.r.n. allergic reaction

## 2024-11-12 NOTE — H&P
"[facility] Medicine  History & Physical    Patient Name: Nadia Damon  MRN: [id number]  Patient Class: OP- Observation  Admission Date: 11/12/2024  Attending Physician: Ephraim Malin MD   Primary Care Provider: Elis Wick MD         Patient information was obtained from patient, caregiver / friend, past medical records, and ER records.     Subjective:     Principal Problem:Adverse drug reaction    Chief Complaint:   Chief Complaint   Patient presents with    Chest Pain     Patient presents to ED from Cardiology clinic with c/o chest pain and SOB that started during stress test. History of heart transplant.         HPI: 69 year old female, comorbid conditions include HTN, CKD stage IV, CAD, breast CA, GUSTAVO, CHF, prior CVA and history of heart transplant in 1991. Patient sent to [facility] ED via EMS from cardiology clinic due to chest pain that onset after a dobutamine stress test. Patient reported feeling a "squeezing, heavy" discomfort during her stress echo that persisted even after dobutamine had been discontinued. Given chest pain symptoms, nitro was administered x 2. Shortly thereafter, patient became markedly wheezy and SOB and had seizure like-activity/tremors, prompting the EMS call. She received steroids, racemic epinephrine, and ativan upon arrival to ED with improvement in her symptoms. Cardiology was consulted to assist with management. Patient seen and examined today in ED. Remains very anxious/jittery. Still mildly SOB and endorses slight chest discomfort although much improved from earlier today. She reports compliance with her home medications. Followed by advanced CHF/transplant team (Dr. Tubbs). Chart reviewed. Initial troponin negative. BNP > 700. CT of head with NAF. TTE 7/24 with normal EF, small PFO. Stress images/echo not completed today given acuity of situation/patient's symptoms. Transplant team aware of patient's admission at this facility. Patient " is a full code. Placed in observation under the care of Hospital Medicine for management of allergic reaction/adverse drug reaction.    Past Medical History:   Diagnosis Date    Abdominal wall hernia     CT Renal 6/11/2018---Small fat containing superior ventral abdominal wall hernia at the epicardial pacing lead site.    Abnormal mammogram 10/12/2021    Acute cystitis without hematuria 05/10/2022    Acute ischemic right middle cerebral artery (MCA) stroke 07/20/2024    GRACE (acute kidney injury) 11/22/2021    Anxiety     Aphasia 07/19/2024    Arthritis     ZEN HIPS    Breast cancer in female 08/2021    LEFT BREAST    Bronchitis 08/18/2016    Never smoked      C. difficile colitis 11/29/2021    Cellulitis of axilla, left 12/23/2021    Chronic diastolic heart failure 12/16/2021    Chronic kidney disease     stage 4, GFR 15-29 ml/min    Chronic midline low back pain without sciatica 06/18/2018    Closed nondisplaced fracture of distal phalanx of left great toe with routine healing 10/22/2018    Coronary artery disease 1993    heart transplant    COVID-19 in immunocompromised patient 02/26/2024    Cystitis 05/10/2022    Depression     Encounter for blood transfusion     Encounter for palliative care 10/14/2024    Fibromyalgia     on Lyrica    Heart failure     native heart cardiomyopathy    Heart transplanted 1993    due to cardiomyopathy    History of hyperparathyroidism; Hyperparathyroidism, secondary renal     PT DENIES    Hypertension     Immune disorder     anti rejection meds    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    Impaired mobility 07/28/2022    Iron deficiency anemia 08/15/2017    Kidney stones     passed per pt    Obesity     Obesity (BMI 30.0-34.9) 07/22/2019    Other osteoporosis without current pathological fracture 08/30/2019    Other pancytopenia 05/06/2024    Parotitis, acute 09/19/2023    Pharyngitis 01/09/2019    Pneumonia due to infectious organism 03/14/2024    PONV  (postoperative nausea and vomiting)     Severe sepsis 11/22/2021    Shingles 2003 approx    left leg    Subclinical hypothyroidism 06/16/2023    Thrombocytopenia, unspecified 11/29/2021    Trouble in sleeping     Urinary incontinence        Past Surgical History:   Procedure Laterality Date    bladder implant Right 06/11/2024    BLADDER SURGERY  2015 approx    mesh - Dr Everett then 2nd reconstructive sx Dr Onofre    BREAST BIOPSY Bilateral     NEGATIVE    BREAST BIOPSY Right 10/31/2022    benign    BREAST LUMPECTOMY Left 2021    BREAST SURGERY Left 09/28/2015    Bx - benign    BREAST SURGERY Right 12/2015    Bx benign    CARDIAC PACEMAKER REMOVAL Left 06/26/2014    Pacer defirillator removed. Put in 1993 aat time of heart transplant    CARPAL TUNNEL RELEASE Left 03/03/2015    Dr. Hall    COLONOSCOPY N/A 02/25/2021    Procedure: COLONOSCOPY;  Surgeon: Freida Ramirez MD;  Location: [facility];  Service: Endoscopy;  Laterality: N/A;    CYSTOCELE REPAIR      Twice with mesh removal    EPIDURAL STEROID INJECTION INTO CERVICAL SPINE N/A 02/02/2023    Procedure: T11/T12 IL HELLEN;  Surgeon: Jassi Pierre MD;  Location: [facility] PAIN MGT;  Service: Pain Management;  Laterality: N/A;    EPIDURAL STEROID INJECTION INTO CERVICAL SPINE N/A 9/16/2024    Procedure: T11/T12 IL HELLEN;  Surgeon: Jassi Pierre MD;  Location: [facility]IN [facility];  Service: Pain Management;  Laterality: N/A;    HEART TRANSPLANT  1993    HERNIA REPAIR Right 1971 approx    Inguinal    HYSTERECTOMY  1983    vag hyst /LSO     IMPLANTATION OF PERMANENT SACRAL NERVE STIMULATOR N/A 06/11/2024    Procedure: INSERTION, NEUROSTIMULATOR, PERMANENT, SACRAL;  Surgeon: Sami Cochran MD;  Location: [facility] OR;  Service: Urology;  Laterality: N/A;    INCISION AND DRAINAGE OF ABSCESS Left 12/24/2021    Procedure: INCISION AND DRAINAGE, ABSCESS;  Surgeon: Joseph Longo MD;  Location: [facility] OR;  Service: General;  Laterality: Left;    INJECTION OF ANESTHETIC  AGENT AROUND MEDIAL BRANCH NERVES INNERVATING LUMBAR FACET JOINT Right 10/19/2022    Procedure: Right L4/L5 and L5/S1 MBB;  Surgeon: Jassi Pierre MD;  Location: [facility] PAIN MGT;  Service: Pain Management;  Laterality: Right;    INJECTION OF ANESTHETIC AGENT AROUND MEDIAL BRANCH NERVES INNERVATING LUMBAR FACET JOINT Right 11/09/2022    Procedure: Right L4/L5 and L5/S1 MBB;  Surgeon: Jassi Pierre MD;  Location: [facility] PAIN [facility]GT;  Service: Pain Management;  Laterality: Right;    INJECTION OF ANESTHETIC AGENT INTO SACROILIAC JOINT Right 08/22/2022    Procedure: Right SIJ Injection Right L5/S1 Facte Injection;  Surgeon: Jassi Pierre MD;  Location: [facility] PAIN [facility]GT;  Service: Pain Management;  Laterality: Right;    INSERTION OF TUNNELED CENTRAL VENOUS CATHETER (CVC) WITH SUBCUTANEOUS PORT N/A 11/09/2021    Procedure: [id number];  Surgeon: Christoph Douglas MD;  Location: [facility] OR;  Service: General;  Laterality: N/A;    RADIOFREQUENCY THERMOCOAGULATION Right 12/07/2022    Procedure: Right L4/L5 and L5/S1 Lumbar RFA;  Surgeon: Jassi Pierre MD;  Location: [facility] PAIN [facility]GT;  Service: Pain Management;  Laterality: Right;    REMOVAL OF VASCULAR ACCESS PORT      ROBOT-ASSISTED LAPAROSCOPIC ABDOMINAL SACROCOLPOPEXY N/A 08/10/2023    Procedure: ROBOTIC SACROCOLPOPEXY, ABDOMEN;  Surgeon: PRANAY Villalobos MD;  Location: [facility];  Service: OB/GYN;  Laterality: N/A;    ROBOT-ASSISTED LAPAROSCOPIC OOPHORECTOMY Right 08/10/2023    Procedure: ROBOTIC OOPHORECTOMY;  Surgeon: PRANAY Villalobos MD;  Location: [facility];  Service: OB/GYN;  Laterality: Right;    SENTINEL LYMPH NODE BIOPSY Left 10/12/2021    Procedure: BIOPSY, LYMPH NODE, SENTINEL;  Surgeon: Christoph Douglas MD;  Location: [facility];  Service: General;  Laterality: Left;    TOE SURGERY      XI ROBOTIC URETHROPEXY N/A 08/10/2023    Procedure: XI ROBOTIC URETHROPEXY;  Surgeon: PRANAY Villalobos MD;  Location: [facility];  Service: OB/GYN;  Laterality: N/A;        Review of patient's allergies indicates:   Allergen Reactions    Lisinopril Swelling and Rash    Nitro Shortness Of Breath     Audible wheezing     Hydrocodone-acetaminophen Nausea Only    Augmentin [amoxicillin-pot clavulanate] Diarrhea    Zyvox [linezolid] Nausea And Vomiting       No current facility-administered medications on file prior to encounter.     Current Outpatient Medications on File Prior to Encounter   Medication Sig    aspirin (ECOTRIN) 81 MG EC tablet Take 1 tablet (81 mg total) by mouth once daily.    calcitRIOL (ROCALTROL) 0.25 MCG Cap Take 1 capsule (0.25 mcg total) by mouth once daily.    carvediloL (COREG) 3.125 MG tablet Take 1 tablet (3.125 mg total) by mouth 2 (two) times daily with meals. Hold parameters: SBP less than 110 and/or DBP less than 75    cycloSPORINE modified, NEORAL, (NEORAL) 25 MG capsule Take 3 capsules (75 mg total) by mouth 2 (two) times daily.    furosemide (LASIX) 20 MG tablet Take 2 tablets (40 mg total) by mouth once daily. HOLD UNTIL FOLLOW UP WITH PCP    ondansetron (ZOFRAN) 4 MG tablet Take 4 mg by mouth as needed for Nausea.    pantoprazole (PROTONIX) 20 MG tablet TAKE 1 TABLET ONE TIME DAILY    patiromer calcium sorbitex (VELTASSA) 8.4 gram PwPk Take 1 packet (8.4 g total) by mouth once daily    pregabalin (LYRICA) 25 MG capsule Take 1 capsule (25 mg total) by mouth every evening.    sodium bicarbonate 650 MG tablet Take 1 tablet (650 mg total) by mouth 2 (two) times daily.    temazepam (RESTORIL) 30 mg capsule Take 1 capsule (30 mg total) by mouth nightly as needed for Insomnia. FOR INSOMNIA    tiZANidine 4 mg Cap Take 2 mg by mouth nightly as needed (muscle spasm).    vitamin E 400 UNIT capsule Take 400 Units by mouth once daily.    zolpidem (AMBIEN) 5 MG Tab Take 1 tablet (5 mg total) by mouth every evening.    polyethylene glycol (GLYCOLAX) 17 gram PwPk Take 17 g by mouth once daily.    [DISCONTINUED] hydrALAZINE (APRESOLINE) 25 MG tablet Take 25 mg by  mouth every 8 (eight) hours. HOLD hydralazine for now; may restart if BP is elevated (Patient not taking: Reported on 10/17/2024)     Family History       Problem Relation (Age of Onset)    Breast cancer Mother, Maternal Grandmother    Cancer Mother (38)    Cataracts Cousin    Heart disease Maternal Grandmother    Hypertension Son          Tobacco Use    Smoking status: Never     Passive exposure: Never    Smokeless tobacco: Never   Substance and Sexual Activity    Alcohol use: Never     Alcohol/week: 0.0 standard drinks of alcohol    Drug use: No    Sexual activity: Not Currently     Partners: Male     Birth control/protection: See Surgical Hx     Review of Systems   Constitutional:  Positive for activity change and fatigue.   Respiratory:  Positive for chest tightness and shortness of breath.    Neurological:  Positive for weakness.   Psychiatric/Behavioral:  The patient is nervous/anxious.      Objective:     Vital Signs (Most Recent):  Temp: 99 °F (37.2 °C) (11/12/24 1550)  Pulse: 93 (11/12/24 1550)  Resp: 16 (11/12/24 1550)  BP: 137/80 (11/12/24 1651)  SpO2: 99 % (11/12/24 1550) Vital Signs (24h Range):  Temp:  [97.5 °F (36.4 °C)-99 °F (37.2 °C)] 99 °F (37.2 °C)  Pulse:  [83-96] 93  Resp:  [16-26] 16  SpO2:  [97 %-100 %] 99 %  BP: (137-192)/(70-96) 137/80     Weight: 67.4 kg (148 lb 9.4 oz)  Body mass index is 27.18 kg/m².     Physical Exam  Vitals and nursing note reviewed.   Constitutional:       General: She is not in acute distress.     Appearance: She is well-developed.   HENT:      Head: Normocephalic and atraumatic.      Right Ear: Hearing and external ear normal.      Left Ear: Hearing and external ear normal.      Nose: No rhinorrhea.      Right Sinus: No maxillary sinus tenderness or frontal sinus tenderness.      Left Sinus: No maxillary sinus tenderness or frontal sinus tenderness.      Mouth/Throat:      Mouth: No oral lesions.      Pharynx: Uvula midline.   Eyes:      General:         Right eye:  No discharge.         Left eye: No discharge.      Conjunctiva/sclera: Conjunctivae normal.      Pupils: Pupils are equal, round, and reactive to light.   Neck:      Thyroid: No thyromegaly.      Vascular: No carotid bruit.      Trachea: No tracheal deviation.   Cardiovascular:      Rate and Rhythm: Normal rate and regular rhythm.      Pulses:           Dorsalis pedis pulses are 2+ on the right side and 2+ on the left side.      Heart sounds: Normal heart sounds, S1 normal and S2 normal. No murmur heard.  Pulmonary:      Effort: Pulmonary effort is normal. No respiratory distress.      Breath sounds: Normal breath sounds.   Abdominal:      General: Bowel sounds are normal. There is no distension.      Palpations: Abdomen is soft. There is no mass.      Tenderness: There is no abdominal tenderness.   Musculoskeletal:         General: Normal range of motion.      Cervical back: Normal range of motion.   Lymphadenopathy:      Cervical: No cervical adenopathy.      Upper Body:      Right upper body: No supraclavicular adenopathy.      Left upper body: No supraclavicular adenopathy.   Skin:     General: Skin is warm and dry.      Capillary Refill: Capillary refill takes less than 2 seconds.      Findings: No rash.   Neurological:      Mental Status: She is alert and oriented to person, place, and time.      Sensory: No sensory deficit.      Coordination: Coordination normal.      Gait: Gait normal.   Psychiatric:         Mood and Affect: Mood is anxious. Mood is not depressed.         Speech: Speech is rapid and pressured.         Behavior: Behavior normal.         Thought Content: Thought content normal.         Judgment: Judgment normal.              CRANIAL NERVES     CN III, IV, VI   Pupils are equal, round, and reactive to light.       Significant Labs: All pertinent labs within the past 24 hours have been reviewed.  CBC:   Recent Labs   Lab 11/12/24  1216   WBC 3.12*   HGB 10.1*   HCT 30.9*        CMP:    Recent Labs   Lab 11/12/24  1216      K 4.2      CO2 22*   GLU 92   BUN 59*   CREATININE 4.1*   CALCIUM 9.3   PROT 8.3   ALBUMIN 3.8   BILITOT 0.5   ALKPHOS 140   AST 50*   ALT 32   ANIONGAP 14     Cardiac Markers:   Recent Labs   Lab 11/12/24  1216   *     Troponin:   Recent Labs   Lab 11/12/24  1216   TROPONINI 0.019       Significant Imaging: I have reviewed all pertinent imaging results/findings within the past 24 hours.  Assessment/Plan:     * Adverse drug reaction  Patient undergoing nuclear stress test, developed chest pain/chest discomfort following dobutamine stress test.  Patient then given nitro and became short of breath with wheezing, seizure-like activity.    --new allergy nitroglycerin  --tele  --vitals q.4  --diphenhydramine 25 mg IV q.6 p.r.n. allergic reaction      Chest pain  Felt secondary to adverse drug reaction from dobutamine and nitro.  Patient has history of heart transplant/denervated so will not experience typical angina.  Initial trop negative    --trend troponin  --continue home medications  --transplant team was contacted and is aware of admission      Chronic diastolic (congestive) heart failure  Patient has Diastolic (HFpEF) heart failure that is Chronic. On presentation their CHF was well compensated. Most recent BNP and echo results are listed below.  Recent Labs     11/12/24  1216   *     Latest ECHO  Results for orders placed during the hospital encounter of 07/19/24    Echo Saline Bubble? Yes    Interpretation Summary    Left Ventricle: The left ventricle is normal in size. Normal wall thickness. There is normal systolic function with a visually estimated ejection fraction of 55 - 60%. There is normal diastolic function.    Right Ventricle: Normal right ventricular cavity size. Wall thickness is normal. Systolic function is normal.    Left Atrium: Patent foramen ovale visualized with predominant right to left shunting indicated by saline contrast.     Tricuspid Valve: There is mild regurgitation.    IVC/SVC: Normal venous pressure at 3 mmHg.    The patient is status post cardiac transplantation.    Current Heart Failure Medications       Plan  - Monitor strict I&Os and daily weights.    - Place on telemetry  - Low sodium diet  - Place on fluid restriction of 1.5 L.   - Cardiology has been consulted  - The patient's volume status is at their baseline          Stage 4 chronic kidney disease  Creatine stable for now. BMP reviewed- noted Estimated Creatinine Clearance: 11.5 mL/min (A) (based on SCr of 4.1 mg/dL (H)). according to latest data. Based on current GFR, CKD stage is stage 4 - GFR 15-29.  Monitor UOP and serial BMP and adjust therapy as needed. Renally dose meds. Avoid nephrotoxic medications and procedures.    Essential hypertension  Patients blood pressure range in the last 24 hours was: BP  Min: 80/50  Max: 213/107.The patient's inpatient anti-hypertensive regimen is listed below:  Current Antihypertensives  carvediloL tablet 3.125 mg, 2 times daily with meals, Oral    Plan  - BP is controlled, no changes needed to their regimen      Heart transplanted  S/P transplant in 1991, transplant team contacted, aware of admission.  Patient managed with cyclosporine    .--cyclosporine not available on formulary      VTE Risk Mitigation (From admission, onward)           Ordered     enoxaparin injection 30 mg  Daily         11/12/24 1531     IP VTE HIGH RISK PATIENT  Once         11/12/24 1527     Place sequential compression device  Until discontinued         11/12/24 1527                         On 11/12/2024, patient should be placed in hospital observation services under my care in collaboration with Ephraim Malin MD.      Pharmacist Renal Dose Adjustment Note    Nadia Damon is a 69 y.o. female being treated with the medication enoxaparin.    Patient Data:    Vital Signs (Most Recent):  Temp: 97.5 °F (36.4 °C) (11/12/24 1200)  Pulse: 93 (11/12/24  1430)  Resp: (!) 22 (11/12/24 1430)  BP: (!) 182/92 (11/12/24 1430)  SpO2: 99 % (11/12/24 1430) Vital Signs (72h Range):  Temp:  [97.5 °F (36.4 °C)]   Pulse:  [83-96]   Resp:  [22-26]   BP: (147-192)/(70-96)   SpO2:  [97 %-100 %]      Recent Labs   Lab 11/12/24  1216   CREATININE 4.1*     Serum creatinine: 4.1 mg/dL (H) 11/12/24 1216  Estimated creatinine clearance: 11.5 mL/min (A)    Medication: enoxaparin dose: 40 mg frequency daily will be changed to medication: enoxaparin dose: 30 mg frequency: daily, for CrCl < 30 mL/min    Pharmacist's Name: Melissa Alvarez NP  Department of Hospital Medicine  [facility] - Telemetry ([facility])

## 2024-11-13 ENCOUNTER — DOCUMENTATION ONLY (OUTPATIENT)
Dept: PALLIATIVE MEDICINE | Facility: HOSPITAL | Age: 69
End: 2024-11-13
Payer: MEDICARE

## 2024-11-13 ENCOUNTER — HOSPITAL ENCOUNTER (OUTPATIENT)
Facility: HOSPITAL | Age: 69
Discharge: HOME | End: 2024-11-15
Attending: INTERNAL MEDICINE | Admitting: INTERNAL MEDICINE
Payer: MEDICARE

## 2024-11-13 VITALS
HEART RATE: 88 BPM | BODY MASS INDEX: 27.34 KG/M2 | DIASTOLIC BLOOD PRESSURE: 89 MMHG | OXYGEN SATURATION: 98 % | WEIGHT: 148.56 LBS | RESPIRATION RATE: 18 BRPM | TEMPERATURE: 99 F | SYSTOLIC BLOOD PRESSURE: 161 MMHG | HEIGHT: 62 IN

## 2024-11-13 DIAGNOSIS — R56.9 SEIZURE-LIKE ACTIVITY: Primary | ICD-10-CM

## 2024-11-13 DIAGNOSIS — T44.5X5A: ICD-10-CM

## 2024-11-13 DIAGNOSIS — R07.9 CHEST PAIN: ICD-10-CM

## 2024-11-13 DIAGNOSIS — R00.0 TACHYCARDIA: ICD-10-CM

## 2024-11-13 PROBLEM — R41.89 UNRESPONSIVENESS: Status: ACTIVE | Noted: 2024-11-13

## 2024-11-13 PROBLEM — N63.10 BREAST MASS, RIGHT: Status: RESOLVED | Noted: 2024-11-13 | Resolved: 2024-11-13

## 2024-11-13 PROBLEM — M48.00 SPINAL STENOSIS: Status: ACTIVE | Noted: 2024-07-30

## 2024-11-13 LAB
ALBUMIN SERPL BCP-MCNC: 3.5 G/DL (ref 3.5–5.2)
ALP SERPL-CCNC: 107 U/L (ref 40–150)
ALT SERPL W/O P-5'-P-CCNC: 30 U/L (ref 10–44)
ANION GAP SERPL CALC-SCNC: 16 MMOL/L (ref 8–16)
AST SERPL-CCNC: 40 U/L (ref 10–40)
BASOPHILS # BLD AUTO: 0 K/UL (ref 0–0.2)
BASOPHILS NFR BLD: 0 % (ref 0–1.9)
BILIRUB SERPL-MCNC: 0.4 MG/DL (ref 0.1–1)
BUN SERPL-MCNC: 59 MG/DL (ref 8–23)
CALCIUM SERPL-MCNC: 9.4 MG/DL (ref 8.7–10.5)
CHLORIDE SERPL-SCNC: 102 MMOL/L (ref 95–110)
CO2 SERPL-SCNC: 23 MMOL/L (ref 23–29)
CREAT SERPL-MCNC: 4.7 MG/DL (ref 0.5–1.4)
DIFFERENTIAL METHOD BLD: ABNORMAL
EOSINOPHIL # BLD AUTO: 0 K/UL (ref 0–0.5)
EOSINOPHIL NFR BLD: 0 % (ref 0–8)
ERYTHROCYTE [DISTWIDTH] IN BLOOD BY AUTOMATED COUNT: 15 % (ref 11.5–14.5)
EST. GFR  (NO RACE VARIABLE): 9 ML/MIN/1.73 M^2
GLUCOSE SERPL-MCNC: 124 MG/DL (ref 70–110)
HCT VFR BLD AUTO: 31.1 % (ref 37–48.5)
HGB BLD-MCNC: 9.9 G/DL (ref 12–16)
IMM GRANULOCYTES # BLD AUTO: 0.01 K/UL (ref 0–0.04)
IMM GRANULOCYTES NFR BLD AUTO: 0.3 % (ref 0–0.5)
LIPASE SERPL-CCNC: 36 U/L (ref 4–60)
LYMPHOCYTES # BLD AUTO: 0.5 K/UL (ref 1–4.8)
LYMPHOCYTES NFR BLD: 16.9 % (ref 18–48)
MAGNESIUM SERPL-MCNC: 2.1 MG/DL (ref 1.6–2.6)
MCH RBC QN AUTO: 28.4 PG (ref 27–31)
MCHC RBC AUTO-ENTMCNC: 31.8 G/DL (ref 32–36)
MCV RBC AUTO: 89 FL (ref 82–98)
MONOCYTES # BLD AUTO: 0.3 K/UL (ref 0.3–1)
MONOCYTES NFR BLD: 9.1 % (ref 4–15)
NEUTROPHILS # BLD AUTO: 2.4 K/UL (ref 1.8–7.7)
NEUTROPHILS NFR BLD: 73.7 % (ref 38–73)
NRBC BLD-RTO: 0 /100 WBC
OHS QRS DURATION: 152 MS
OHS QTC CALCULATION: 476 MS
PHOSPHATE SERPL-MCNC: 4.3 MG/DL (ref 2.7–4.5)
PLATELET # BLD AUTO: 204 K/UL (ref 150–450)
PMV BLD AUTO: 9.5 FL (ref 9.2–12.9)
POTASSIUM SERPL-SCNC: 4.9 MMOL/L (ref 3.5–5.1)
PROT SERPL-MCNC: 7.8 G/DL (ref 6–8.4)
RBC # BLD AUTO: 3.49 M/UL (ref 4–5.4)
SARS-COV-2 RDRP RESP QL NAA+PROBE: NEGATIVE
SODIUM SERPL-SCNC: 141 MMOL/L (ref 136–145)
TROPONIN I SERPL DL<=0.01 NG/ML-MCNC: 0.03 NG/ML (ref 0–0.03)
WBC # BLD AUTO: 3.19 K/UL (ref 3.9–12.7)

## 2024-11-13 PROCEDURE — 93010 ELECTROCARDIOGRAM REPORT: CPT | Mod: HCNC,,, | Performed by: INTERNAL MEDICINE

## 2024-11-13 PROCEDURE — 36415 COLL VENOUS BLD VENIPUNCTURE: CPT | Mod: HCNC | Performed by: NURSE PRACTITIONER

## 2024-11-13 PROCEDURE — 99214 OFFICE O/P EST MOD 30 MIN: CPT | Mod: HCNC,,, | Performed by: INTERNAL MEDICINE

## 2024-11-13 PROCEDURE — 96374 THER/PROPH/DIAG INJ IV PUSH: CPT | Mod: 59

## 2024-11-13 PROCEDURE — S0028 INJECTION, FAMOTIDINE, 20 MG: HCPCS | Mod: HCNC | Performed by: NURSE PRACTITIONER

## 2024-11-13 PROCEDURE — G0378 HOSPITAL OBSERVATION PER HR: HCPCS | Mod: HCNC

## 2024-11-13 PROCEDURE — 99900035 HC TECH TIME PER 15 MIN (STAT): Mod: HCNC

## 2024-11-13 PROCEDURE — 25000003 PHARM REV CODE 250: Mod: HCNC | Performed by: NURSE PRACTITIONER

## 2024-11-13 PROCEDURE — 85025 COMPLETE CBC W/AUTO DIFF WBC: CPT | Mod: HCNC | Performed by: NURSE PRACTITIONER

## 2024-11-13 PROCEDURE — 96372 THER/PROPH/DIAG INJ SC/IM: CPT | Performed by: NURSE PRACTITIONER

## 2024-11-13 PROCEDURE — 25000003 PHARM REV CODE 250: Mod: HCNC | Performed by: INTERNAL MEDICINE

## 2024-11-13 PROCEDURE — 93005 ELECTROCARDIOGRAM TRACING: CPT | Mod: HCNC

## 2024-11-13 PROCEDURE — 63600175 PHARM REV CODE 636 W HCPCS: Mod: HCNC | Performed by: INTERNAL MEDICINE

## 2024-11-13 PROCEDURE — 80158 DRUG ASSAY CYCLOSPORINE: CPT | Mod: HCNC | Performed by: NURSE PRACTITIONER

## 2024-11-13 PROCEDURE — 27000221 HC OXYGEN, UP TO 24 HOURS: Mod: HCNC

## 2024-11-13 PROCEDURE — 96375 TX/PRO/DX INJ NEW DRUG ADDON: CPT

## 2024-11-13 PROCEDURE — 87635 SARS-COV-2 COVID-19 AMP PRB: CPT | Mod: HCNC | Performed by: NURSE PRACTITIONER

## 2024-11-13 PROCEDURE — 96374 THER/PROPH/DIAG INJ IV PUSH: CPT

## 2024-11-13 PROCEDURE — 96376 TX/PRO/DX INJ SAME DRUG ADON: CPT

## 2024-11-13 PROCEDURE — 21400001 HC TELEMETRY ROOM: Mod: HCNC

## 2024-11-13 PROCEDURE — 84100 ASSAY OF PHOSPHORUS: CPT | Mod: HCNC | Performed by: NURSE PRACTITIONER

## 2024-11-13 PROCEDURE — 80053 COMPREHEN METABOLIC PANEL: CPT | Mod: HCNC | Performed by: NURSE PRACTITIONER

## 2024-11-13 PROCEDURE — 96365 THER/PROPH/DIAG IV INF INIT: CPT

## 2024-11-13 PROCEDURE — 83690 ASSAY OF LIPASE: CPT | Mod: HCNC | Performed by: NURSE PRACTITIONER

## 2024-11-13 PROCEDURE — 63600175 PHARM REV CODE 636 W HCPCS: Mod: HCNC | Performed by: NURSE PRACTITIONER

## 2024-11-13 PROCEDURE — 83735 ASSAY OF MAGNESIUM: CPT | Mod: HCNC | Performed by: NURSE PRACTITIONER

## 2024-11-13 PROCEDURE — G0379 DIRECT REFER HOSPITAL OBSERV: HCPCS | Mod: HCNC

## 2024-11-13 PROCEDURE — G0426 INPT/ED TELECONSULT50: HCPCS | Mod: HCNC,95,, | Performed by: STUDENT IN AN ORGANIZED HEALTH CARE EDUCATION/TRAINING PROGRAM

## 2024-11-13 PROCEDURE — 63600175 PHARM REV CODE 636 W HCPCS: Mod: HCNC | Performed by: HOSPITALIST

## 2024-11-13 PROCEDURE — 25000003 PHARM REV CODE 250: Mod: HCNC | Performed by: HOSPITALIST

## 2024-11-13 PROCEDURE — 84484 ASSAY OF TROPONIN QUANT: CPT | Mod: HCNC | Performed by: NURSE PRACTITIONER

## 2024-11-13 RX ORDER — VITAMIN E 268 MG
400 CAPSULE ORAL DAILY
Status: DISCONTINUED | OUTPATIENT
Start: 2024-11-14 | End: 2024-11-15 | Stop reason: HOSPADM

## 2024-11-13 RX ORDER — HYDROMORPHONE HYDROCHLORIDE 2 MG/ML
2 INJECTION, SOLUTION INTRAMUSCULAR; INTRAVENOUS; SUBCUTANEOUS
Status: DISCONTINUED | OUTPATIENT
Start: 2024-11-13 | End: 2024-11-13

## 2024-11-13 RX ORDER — ZOLPIDEM TARTRATE 5 MG/1
5 TABLET ORAL NIGHTLY
Status: DISCONTINUED | OUTPATIENT
Start: 2024-11-14 | End: 2024-11-15 | Stop reason: HOSPADM

## 2024-11-13 RX ORDER — CARVEDILOL 3.12 MG/1
3.12 TABLET ORAL 2 TIMES DAILY WITH MEALS
Status: DISCONTINUED | OUTPATIENT
Start: 2024-11-14 | End: 2024-11-15

## 2024-11-13 RX ORDER — SODIUM CHLORIDE 0.9 % (FLUSH) 0.9 %
10 SYRINGE (ML) INJECTION EVERY 6 HOURS PRN
Status: DISCONTINUED | OUTPATIENT
Start: 2024-11-14 | End: 2024-11-15 | Stop reason: HOSPADM

## 2024-11-13 RX ORDER — NALOXONE HCL 0.4 MG/ML
0.4 VIAL (ML) INJECTION
Status: DISCONTINUED | OUTPATIENT
Start: 2024-11-13 | End: 2024-11-15 | Stop reason: HOSPADM

## 2024-11-13 RX ORDER — ALUMINUM HYDROXIDE, MAGNESIUM HYDROXIDE, AND SIMETHICONE 1200; 120; 1200 MG/30ML; MG/30ML; MG/30ML
30 SUSPENSION ORAL 4 TIMES DAILY PRN
Status: DISCONTINUED | OUTPATIENT
Start: 2024-11-13 | End: 2024-11-15 | Stop reason: HOSPADM

## 2024-11-13 RX ORDER — PREGABALIN 25 MG/1
25 CAPSULE ORAL NIGHTLY
Status: DISCONTINUED | OUTPATIENT
Start: 2024-11-14 | End: 2024-11-15 | Stop reason: HOSPADM

## 2024-11-13 RX ORDER — CALCITRIOL 0.25 UG/1
0.25 CAPSULE ORAL DAILY
Status: DISCONTINUED | OUTPATIENT
Start: 2024-11-14 | End: 2024-11-15 | Stop reason: HOSPADM

## 2024-11-13 RX ORDER — TALC
6 POWDER (GRAM) TOPICAL NIGHTLY PRN
Status: DISCONTINUED | OUTPATIENT
Start: 2024-11-14 | End: 2024-11-15 | Stop reason: HOSPADM

## 2024-11-13 RX ORDER — ONDANSETRON HYDROCHLORIDE 2 MG/ML
4 INJECTION, SOLUTION INTRAVENOUS EVERY 6 HOURS PRN
Status: DISCONTINUED | OUTPATIENT
Start: 2024-11-13 | End: 2024-11-15 | Stop reason: HOSPADM

## 2024-11-13 RX ORDER — LORAZEPAM 2 MG/ML
2 INJECTION INTRAMUSCULAR ONCE
Status: COMPLETED | OUTPATIENT
Start: 2024-11-13 | End: 2024-11-13

## 2024-11-13 RX ORDER — LIDOCAINE HYDROCHLORIDE 20 MG/ML
15 SOLUTION OROPHARYNGEAL ONCE
Status: COMPLETED | OUTPATIENT
Start: 2024-11-13 | End: 2024-11-13

## 2024-11-13 RX ORDER — POLYETHYLENE GLYCOL 3350 17 G/17G
17 POWDER, FOR SOLUTION ORAL DAILY
Status: DISCONTINUED | OUTPATIENT
Start: 2024-11-14 | End: 2024-11-15 | Stop reason: HOSPADM

## 2024-11-13 RX ORDER — NALOXONE HCL 0.4 MG/ML
0.4 VIAL (ML) INJECTION
Status: DISCONTINUED | OUTPATIENT
Start: 2024-11-13 | End: 2024-11-13 | Stop reason: HOSPADM

## 2024-11-13 RX ORDER — LORAZEPAM 2 MG/ML
2 INJECTION INTRAMUSCULAR EVERY 4 HOURS PRN
Status: DISCONTINUED | OUTPATIENT
Start: 2024-11-13 | End: 2024-11-13

## 2024-11-13 RX ORDER — HYDROMORPHONE HYDROCHLORIDE 2 MG/ML
1 INJECTION, SOLUTION INTRAMUSCULAR; INTRAVENOUS; SUBCUTANEOUS
Status: DISCONTINUED | OUTPATIENT
Start: 2024-11-13 | End: 2024-11-13 | Stop reason: HOSPADM

## 2024-11-13 RX ORDER — CALCIUM CARBONATE 200(500)MG
1000 TABLET,CHEWABLE ORAL 3 TIMES DAILY PRN
Status: DISCONTINUED | OUTPATIENT
Start: 2024-11-13 | End: 2024-11-15 | Stop reason: HOSPADM

## 2024-11-13 RX ORDER — HEPARIN SODIUM 5000 [USP'U]/ML
5000 INJECTION, SOLUTION INTRAVENOUS; SUBCUTANEOUS EVERY 8 HOURS
Status: DISCONTINUED | OUTPATIENT
Start: 2024-11-14 | End: 2024-11-15 | Stop reason: HOSPADM

## 2024-11-13 RX ORDER — LORAZEPAM 2 MG/ML
2 INJECTION INTRAMUSCULAR
Status: DISCONTINUED | OUTPATIENT
Start: 2024-11-13 | End: 2024-11-13 | Stop reason: HOSPADM

## 2024-11-13 RX ORDER — SODIUM BICARBONATE 650 MG/1
650 TABLET ORAL 2 TIMES DAILY
Status: DISCONTINUED | OUTPATIENT
Start: 2024-11-14 | End: 2024-11-15 | Stop reason: HOSPADM

## 2024-11-13 RX ORDER — HYDROMORPHONE HYDROCHLORIDE 2 MG/ML
1 INJECTION, SOLUTION INTRAMUSCULAR; INTRAVENOUS; SUBCUTANEOUS ONCE
Status: COMPLETED | OUTPATIENT
Start: 2024-11-13 | End: 2024-11-13

## 2024-11-13 RX ORDER — HYDROMORPHONE HYDROCHLORIDE 1 MG/ML
1 INJECTION, SOLUTION INTRAMUSCULAR; INTRAVENOUS; SUBCUTANEOUS
Status: DISCONTINUED | OUTPATIENT
Start: 2024-11-13 | End: 2024-11-14

## 2024-11-13 RX ORDER — LORAZEPAM 2 MG/ML
2 INJECTION INTRAMUSCULAR
Status: DISCONTINUED | OUTPATIENT
Start: 2024-11-13 | End: 2024-11-14

## 2024-11-13 RX ORDER — ASPIRIN 81 MG/1
81 TABLET ORAL DAILY
Status: DISCONTINUED | OUTPATIENT
Start: 2024-11-14 | End: 2024-11-15 | Stop reason: HOSPADM

## 2024-11-13 RX ORDER — ALUMINUM HYDROXIDE, MAGNESIUM HYDROXIDE, AND SIMETHICONE 1200; 120; 1200 MG/30ML; MG/30ML; MG/30ML
30 SUSPENSION ORAL ONCE
Status: DISCONTINUED | OUTPATIENT
Start: 2024-11-13 | End: 2024-11-13 | Stop reason: HOSPADM

## 2024-11-13 RX ORDER — FAMOTIDINE 20 MG/50ML
20 INJECTION, SOLUTION INTRAVENOUS DAILY
Status: DISCONTINUED | OUTPATIENT
Start: 2024-11-13 | End: 2024-11-13 | Stop reason: HOSPADM

## 2024-11-13 RX ORDER — ENOXAPARIN SODIUM 100 MG/ML
1 INJECTION SUBCUTANEOUS ONCE
Status: COMPLETED | OUTPATIENT
Start: 2024-11-13 | End: 2024-11-13

## 2024-11-13 RX ORDER — PROCHLORPERAZINE EDISYLATE 5 MG/ML
5 INJECTION INTRAMUSCULAR; INTRAVENOUS EVERY 6 HOURS PRN
Status: DISCONTINUED | OUTPATIENT
Start: 2024-11-14 | End: 2024-11-15 | Stop reason: HOSPADM

## 2024-11-13 RX ORDER — DIPHENHYDRAMINE HYDROCHLORIDE 50 MG/ML
25 INJECTION INTRAMUSCULAR; INTRAVENOUS EVERY 6 HOURS PRN
Status: DISCONTINUED | OUTPATIENT
Start: 2024-11-13 | End: 2024-11-14

## 2024-11-13 RX ORDER — ACETAMINOPHEN 325 MG/1
650 TABLET ORAL EVERY 4 HOURS PRN
Status: DISCONTINUED | OUTPATIENT
Start: 2024-11-13 | End: 2024-11-15 | Stop reason: HOSPADM

## 2024-11-13 RX ORDER — FAMOTIDINE 20 MG/1
20 TABLET, FILM COATED ORAL
Status: DISCONTINUED | OUTPATIENT
Start: 2024-11-14 | End: 2024-11-15 | Stop reason: HOSPADM

## 2024-11-13 RX ORDER — CALCIUM CARBONATE 200(500)MG
1000 TABLET,CHEWABLE ORAL 3 TIMES DAILY PRN
Status: DISCONTINUED | OUTPATIENT
Start: 2024-11-13 | End: 2024-11-13 | Stop reason: HOSPADM

## 2024-11-13 RX ADMIN — CARVEDILOL 3.12 MG: 3.12 TABLET, FILM COATED ORAL at 06:11

## 2024-11-13 RX ADMIN — HYDROMORPHONE HYDROCHLORIDE 1 MG: 2 INJECTION INTRAMUSCULAR; INTRAVENOUS; SUBCUTANEOUS at 09:11

## 2024-11-13 RX ADMIN — POLYETHYLENE GLYCOL 3350 17 G: 17 POWDER, FOR SOLUTION ORAL at 08:11

## 2024-11-13 RX ADMIN — CYCLOSPORINE 75 MG: 25 CAPSULE, LIQUID FILLED ORAL at 08:11

## 2024-11-13 RX ADMIN — HYDROMORPHONE HYDROCHLORIDE 1 MG: 1 INJECTION, SOLUTION INTRAMUSCULAR; INTRAVENOUS; SUBCUTANEOUS at 11:11

## 2024-11-13 RX ADMIN — LORAZEPAM 2 MG: 2 INJECTION INTRAMUSCULAR; INTRAVENOUS at 08:11

## 2024-11-13 RX ADMIN — ENOXAPARIN SODIUM 70 MG: 80 INJECTION SUBCUTANEOUS at 09:11

## 2024-11-13 RX ADMIN — SODIUM BICARBONATE 650 MG TABLET 650 MG: at 08:11

## 2024-11-13 RX ADMIN — ALUMINUM HYDROXIDE, MAGNESIUM HYDROXIDE, AND SIMETHICONE 30 ML: 200; 200; 20 SUSPENSION ORAL at 11:11

## 2024-11-13 RX ADMIN — PREGABALIN 25 MG: 25 CAPSULE ORAL at 08:11

## 2024-11-13 RX ADMIN — HYDROMORPHONE HYDROCHLORIDE 2 MG: 2 INJECTION INTRAMUSCULAR; INTRAVENOUS; SUBCUTANEOUS at 11:11

## 2024-11-13 RX ADMIN — MORPHINE SULFATE 4 MG: 4 INJECTION INTRAVENOUS at 08:11

## 2024-11-13 RX ADMIN — CARVEDILOL 3.12 MG: 3.12 TABLET, FILM COATED ORAL at 08:11

## 2024-11-13 RX ADMIN — ASPIRIN 81 MG: 81 TABLET, COATED ORAL at 08:11

## 2024-11-13 RX ADMIN — LIDOCAINE HYDROCHLORIDE 15 ML: 20 SOLUTION ORAL at 09:11

## 2024-11-13 RX ADMIN — LORAZEPAM 2 MG: 2 INJECTION INTRAMUSCULAR; INTRAVENOUS at 11:11

## 2024-11-13 RX ADMIN — NALXONE HYDROCHLORIDE 0.4 MG: 0.4 INJECTION INTRAMUSCULAR; INTRAVENOUS; SUBCUTANEOUS at 12:11

## 2024-11-13 RX ADMIN — ALUMINUM HYDROXIDE, MAGNESIUM HYDROXIDE, AND SIMETHICONE 30 ML: 200; 200; 20 SUSPENSION ORAL at 08:11

## 2024-11-13 RX ADMIN — ALPRAZOLAM 0.25 MG: 0.25 TABLET ORAL at 03:11

## 2024-11-13 RX ADMIN — CYCLOSPORINE 75 MG: 25 CAPSULE, LIQUID FILLED ORAL at 06:11

## 2024-11-13 RX ADMIN — FAMOTIDINE 20 MG: 20 INJECTION, SOLUTION INTRAVENOUS at 09:11

## 2024-11-13 RX ADMIN — CALCITRIOL CAPSULES 0.25 MCG 0.25 MCG: 0.25 CAPSULE ORAL at 08:11

## 2024-11-13 RX ADMIN — ZOLPIDEM TARTRATE 5 MG: 5 TABLET, FILM COATED ORAL at 08:11

## 2024-11-13 NOTE — ASSESSMENT & PLAN NOTE
-Likely medication reaction due to dobutamine/nitro allergy  -Improved since admission, received steroids, racemic epinephrine  -Agree with dose of IV Lasix    11/13/24  -Improved

## 2024-11-13 NOTE — ASSESSMENT & PLAN NOTE
-BNP higher than baseline  -Received IV Lasix in ED, assess response  -Continue other home CV meds    11/13/24  -Creatinine bumped, diuretics held

## 2024-11-13 NOTE — SUBJECTIVE & OBJECTIVE
HPI:  69 y.o. female with medical history of HTN, CKD stage IV, CAD, breast CA, GUSTAVO, CHF, prior CVA, heart transplant in 1991- with admission concerns of chest pain evaluation. Hospital course complicated by seizure like activity. And neurology has been consulted for the same.     History from patient / medical records review. Per report - nonverbal during episode which lasted around 2 minutes. Toward end of event patient grabbed one of providers hands and immediately began speaking. She was not post-ictal. Patient continued to shake but was awake and oriented x 4 without neurological deficits. Oxygen saturation remain 100% through event. -120's. 1mg Ativan IV given with gradual relief in symptoms. She reports increased physical symptoms related to increase pain or anxiety. Prior to event nurse reports patient complained of dyspnea while checking vitals.     No obvious generalized tonic clonic event - with clear focal onset / secondary generalization movements. No associated presentation of loss of bladder continence /  shoulder dislocation. No tongue biting. No prodromal symptoms. Reported of stress and anxiety prior to the event / related to poor sleep pattern overnight despite home sleep aid medications; with resultant need for added dosing of BZD. Otherwise - No substance abuse. No history of epilepsy. No family history of epilepsy. No history of strokes or complex migraines.     Images personally reviewed and interpreted:  Study: Head CT  Study Interpretation: No acute findings     Additional studies reviewed:   Study: Cardiac_Neuro: EEG  Study Interpretation:  Pending    Laboratory studies reviewed:  BMP:   Lab Results   Component Value Date     11/13/2024    K 4.9 11/13/2024     11/13/2024    CO2 23 11/13/2024    BUN 59 (H) 11/13/2024    CREATININE 4.7 (H) 11/13/2024    CALCIUM 9.4 11/13/2024     CBC:   Lab Results   Component Value Date    WBC 3.19 (L) 11/13/2024    RBC 3.49 (L) 11/13/2024     HGB 9.9 (L) 11/13/2024    HCT 31.1 (L) 11/13/2024     11/13/2024    MCV 89 11/13/2024    MCH 28.4 11/13/2024    MCHC 31.8 (L) 11/13/2024     Lipid Panel:   Lab Results   Component Value Date    CHOL 142 07/20/2024    LDLCALC 81.0 07/20/2024    HDL 44 07/20/2024    TRIG 85 07/20/2024     Coagulation:   Lab Results   Component Value Date    INR 1.0 11/12/2024    INR 1.1 05/24/2006    APTT 28.3 11/12/2024     Hgb A1C:   Lab Results   Component Value Date    HGBA1C 5.1 07/02/2024     TSH:   Lab Results   Component Value Date    TSH 10.030 (H) 11/12/2024       Documentation personally reviewed:  Notes: Notes: H&P     Assessment and plan:  Recs:  Seizure like activity - suspect non epileptiform rather epileptiform activity     EEG > 1 HR/rotuine   Need no AED at the moment. If EEG negative no further interventions or investigations needed from neuro standpoint. If any concerns for epileptiform activity - can consider keppra according to renal based dosing.     - Prn ativan 1-2 mg IV if GTC or focal complex with secondary generalization > 1 mins / repeat ativan every 2 mins to .1mg/kg for uncontrolled seizures    -- Correct lytes as needed / control infection if any / avoid hypoxia or hypercapnia / avoid hypoglycemia   -- Correct any nutritional deficiencies / correct anemia / provide nutritional support as appropriate / ambulate when appropriate - PT/OT recs   -- seizure precautions      Provide seizure counseling provided during the visit - Identification of signs; emergency action and ER attention; risk factor control if any, avoidance of sleep deprivation, environmental modifications / optimization for secondary injury prevention; compliance to medications     Post charge discharge plan:  Clinic follow up: None    Visit Type: N/A  Timeframe: N/A

## 2024-11-13 NOTE — SIGNIFICANT EVENT
Responded to rapid response. Noted patient turned on side having seizure-like activity. Patient nonverbal during episode which lasted around 2 minutes. Toward end of event patient grabbed one of providers hands and immediately began speaking. She was not post-ictal. Patient continued to shake but was awake and oriented x 4 without neurological deficits. Oxygen saturation remain 100% through event. -120's.  1mg Ativan IV given with gradual relief in symptoms. She reports increased physical symptoms related to increase pain or anxiety. Prior to event nurse reports patient complained of dyspnea while checking vitals. HR improved to 80's and jerking movements are minimal. Will closely monitor patient.

## 2024-11-13 NOTE — CHAPLAIN
Initial visit with patient.  Visited with patient upon request from Mona with the palliative care team.  At the time of my visit pt was having pain in her heart and was not very settled.  I found her nurse and let her know about this and she mentioned that it was most likely due to a medication that she had just given her.  After letting her nurse know I went back to the room and pt mentioned that she was feeling cold and asked for a blanket.  I was not able to find one but one of the patient care technicians was able to get one for her.  Pt asked for prayer and I took time to do this for her before leaving.  Pt currently had no other needs and spiritual care remains available as needed.    Chaplain Paxton Yates M.Div., [facility]

## 2024-11-13 NOTE — SUBJECTIVE & OBJECTIVE
Review of Systems   Constitutional: Positive for malaise/fatigue.   HENT: Negative.     Eyes: Negative.    Cardiovascular:  Positive for chest pain (resolved at time of visit) and dyspnea on exertion.   Respiratory: Negative.     Endocrine: Negative.    Hematologic/Lymphatic: Negative.    Gastrointestinal: Negative.    Genitourinary: Negative.    Neurological: Negative.      Objective:     Vital Signs (Most Recent):  Temp: 98.9 °F (37.2 °C) (11/13/24 0717)  Pulse: 94 (11/13/24 0807)  Resp: 16 (11/13/24 0908)  BP: (!) 160/88 (11/13/24 0717)  SpO2: 100 % (11/13/24 0717) Vital Signs (24h Range):  Temp:  [97.5 °F (36.4 °C)-99 °F (37.2 °C)] 98.9 °F (37.2 °C)  Pulse:  [] 94  Resp:  [16-26] 16  SpO2:  [97 %-100 %] 100 %  BP: (110-192)/(58-96) 160/88     Weight: 67.4 kg (148 lb 9.4 oz)  Body mass index is 27.18 kg/m².     SpO2: 100 %         Intake/Output Summary (Last 24 hours) at 11/13/2024 1126  Last data filed at 11/13/2024 0710  Gross per 24 hour   Intake 264 ml   Output 800 ml   Net -536 ml       Lines/Drains/Airways       Peripheral Intravenous Line  Duration                  Peripheral IV - Single Lumen 11/12/24 1025 24 G Anterior;Distal;Right Upper Arm 1 day         Peripheral IV - Single Lumen 11/12/24 1202 18 G 1 1/4 in Anterior;Left Upper Arm <1 day                       Physical Exam  Vitals and nursing note reviewed.   Constitutional:       General: She is not in acute distress.     Appearance: She is well-developed. She is not diaphoretic.   HENT:      Head: Normocephalic and atraumatic.   Eyes:      General:         Right eye: No discharge.         Left eye: No discharge.      Pupils: Pupils are equal, round, and reactive to light.   Cardiovascular:      Rate and Rhythm: Normal rate and regular rhythm.      Heart sounds: Normal heart sounds, S1 normal and S2 normal. No murmur heard.  Pulmonary:      Effort: Pulmonary effort is normal. No respiratory distress.      Breath sounds: Rhonchi present.    Abdominal:      General: There is no distension.      Comments: +hernia     Musculoskeletal:      Right lower leg: No edema.      Left lower leg: No edema.   Skin:     General: Skin is warm and dry.      Findings: No erythema.   Neurological:      Mental Status: She is alert and oriented to person, place, and time.   Psychiatric:         Behavior: Behavior normal.      Comments: Mildly anxious            Significant Labs: CMP   Recent Labs   Lab 11/12/24  1216 11/13/24  0448    141   K 4.2 4.9    102   CO2 22* 23   GLU 92 124*   BUN 59* 59*   CREATININE 4.1* 4.7*   CALCIUM 9.3 9.4   PROT 8.3 7.8   ALBUMIN 3.8 3.5   BILITOT 0.5 0.4   ALKPHOS 140 107   AST 50* 40   ALT 32 30   ANIONGAP 14 16   , CBC   Recent Labs   Lab 11/12/24  1216 11/13/24  0448   WBC 3.12* 3.19*   HGB 10.1* 9.9*   HCT 30.9* 31.1*    204   , Troponin   Recent Labs   Lab 11/12/24  1808 11/12/24  2153 11/13/24  0824   TROPONINI 0.055* 0.059* 0.031*   , and All pertinent lab results from the last 24 hours have been reviewed.    Significant Imaging: Echocardiogram: Transthoracic echo (TTE) complete (Cupid Only):   Results for orders placed or performed during the hospital encounter of 07/19/24   Echo Saline Bubble? Yes   Result Value Ref Range    BSA 1.83 m2    LVOT stroke volume 81.51 cm3    LVIDd 4.09 3.5 - 6.0 cm    LV Systolic Volume 21.41 mL    LV Systolic Volume Index 12.0 mL/m2    LVIDs 2.46 2.1 - 4.0 cm    LV Diastolic Volume 73.65 mL    LV Diastolic Volume Index 41.38 mL/m2    Left Ventricular End Systolic Volume by Teichholz Method 21.41 mL    Left Ventricular End Diastolic Volume by Teichholz Method 73.65 mL    IVS 1.08 0.6 - 1.1 cm    LVOT diameter 2.01 cm    LVOT area 3.2 cm2    FS 40 28 - 44 %    Left Ventricle Relative Wall Thickness 0.43 cm    PW 0.87 0.6 - 1.1 cm    LV mass 127.01 g    LV Mass Index 71 g/m2    MV Peak E Jacky 0.82 m/s    TDI LATERAL 0.13 m/s    TDI SEPTAL 0.07 m/s    E/E' ratio 8.20 m/s    MV Peak A  Jacky 0.72 m/s    TR Max Jacky 2.8 m/s    E/A ratio 1.14     IVRT 91.34 msec    E wave deceleration time 168.57 msec    LV SEPTAL E/E' RATIO 11.71 m/s    LV LATERAL E/E' RATIO 6.31 m/s    LVOT peak jacky 1.28 m/s    Left Ventricular Outflow Tract Mean Velocity 0.89 cm/s    Left Ventricular Outflow Tract Mean Gradient 3.61 mmHg    RVOT peak VTI 11.8 cm    TAPSE 1.56 cm    RV/LV Ratio 0.79 cm    LA size 4.06 cm    AV mean gradient 6 mmHg    AV peak gradient 10 mmHg    Ao peak jacky 1.60 m/s    Ao VTI 35.80 cm    LVOT peak VTI 25.70 cm    AV valve area 2.28 cm²    AV Velocity Ratio 0.80     AV index (prosthetic) 0.72     PRESLEY by Velocity Ratio 2.54 cm²    Triscuspid Valve Regurgitation Peak Gradient 31 mmHg    PV mean gradient 1 mmHg    PV PEAK VELOCITY 0.77 m/s    PV peak gradient 2 mmHg    Pulmonary Valve Mean Velocity 0.63 m/s    RVOT peak jacky 0.63 m/s    Ao root annulus 3.18 cm    STJ 2.98 cm    Ascending aorta 3.30 cm    Mean e' 0.10 m/s    ZLVIDS -1.68     ZLVIDD -1.85     RVDD 3.23 cm    TV resting pulmonary artery pressure 34 mmHg    RV TB RVSP 6 mmHg    Est. RA pres 3 mmHg    Narrative      Left Ventricle: The left ventricle is normal in size. Normal wall   thickness. There is normal systolic function with a visually estimated   ejection fraction of 55 - 60%. There is normal diastolic function.    Right Ventricle: Normal right ventricular cavity size. Wall thickness   is normal. Systolic function is normal.    Left Atrium: Patent foramen ovale visualized with predominant right to   left shunting indicated by saline contrast.    Tricuspid Valve: There is mild regurgitation.    IVC/SVC: Normal venous pressure at 3 mmHg.    The patient is status post cardiac transplantation.      and EKG: reviewed

## 2024-11-13 NOTE — HOSPITAL COURSE
11/13/24-Patient seen and examined today, sitting up in bedside chair. Episode of unresponsiveness/seizure-like activity overnight. Neurology consulted, EEG pending. Reported CP and pain under her left arm this AM, relieved after receiving ativan, GI cocktail, and morphine. Looks/feels better at time of exam. Troponin flat/trending down. EKG without any dynamic changes. Creatinine up to 4.7. Transplant team made aware of patient's admission/current condition, transfer being discussed.

## 2024-11-13 NOTE — PLAN OF CARE
Pt c/o of left sided arm pain and tingling    Problem: Adult Inpatient Plan of Care  Goal: Plan of Care Review  Outcome: Progressing  Goal: Patient-Specific Goal (Individualized)  Outcome: Progressing  Goal: Absence of Hospital-Acquired Illness or Injury  Outcome: Progressing  Goal: Optimal Comfort and Wellbeing  Outcome: Progressing  Goal: Readiness for Transition of Care  Outcome: Progressing     Problem: Acute Kidney Injury/Impairment  Goal: Fluid and Electrolyte Balance  Outcome: Progressing  Goal: Improved Oral Intake  Outcome: Progressing  Goal: Effective Renal Function  Outcome: Progressing

## 2024-11-13 NOTE — HOSPITAL COURSE
69 year old female, under the care of Hospital Medicine for management of adverse drug rxn, chest pain. No further symptoms of allergic rxn overnight. Intermittent episodes of seizure-like activity, intermittent chest pain. Patient experienced severe chest pain on 11/13. Repeat trop and EKG ordered. Discussed results with Cardiology, recommended to seek transfer to [facility] for higher level of care due to medical complexity. Consulted neurology for evaluation, recommended EEG >1H. Hold on initiating antiseizure medications, OK to treat with ativan for seizure activity. Consulted [facility], case reviewed. Patient accepted for transfer, bed assignment completed, discharge orders placed.

## 2024-11-13 NOTE — ASSESSMENT & PLAN NOTE
-Likely due to adverse drug reaction from dobutamine stress/nitro allergy  -Improved during exam  -Patient with history of heart transplant/denervated so will not experience typical angina  -Initial troponin negative, repeat pending  -EKG reviewed, no acute ischemic changes noted, chronic RBBB  -Continue OMT  -Transplant team aware of admission    11/13/24  -Unclear etiology, improved at time of exam  -Troponin flat/trending down  -EF normal on rest images yesterday  -No dynamic changes noted on EKG  -Continue OMT  -Transplant team aware, transfer being discussed

## 2024-11-13 NOTE — PROGRESS NOTES
Palliative Nurse Note    Nurse followed up on pt after our team went to visit her, she is a clinical palliative pt. She was in bed resting two friends at bed side, pt stated she will be transferred to [facility] tomorrow. She was in and out of sleep, her nurse stated was in need of support to help with severe anxiety and had c/o of chest pain. Pt resting, nurse lets friends know palliative will follow up as needed.

## 2024-11-13 NOTE — PROVIDER TRANSFER
(Physician in Lead of Transfers)  Outside Transfer Acceptance Note / Regional Referral Center    Upon patient arrival to floor, please send SecureChat to [facility] or call extension [phone] (if no answer, do NOT leave a callback number after the beep, rather please send a SecureChat to [facility]), for Hospital Medicine admit team assignment and for additional admit orders for the patient.  Do not page the attending physician associated with the patient on arrival (this physician may not be on duty at the time of arrival).  Rather, always send a SecureChat to [facility] or call [phone] to reach the triage physician for orders and team assignment.    Referring facility: [facility]   Referring provider: FAYE MONROY  Accepting facility: [facility]  Accepting provider: NEGIN OSORIO  Reason for transfer:  Higher level of care  Transfer diagnosis: chest pain, seizure-like activity  Transfer specialty requested: Transplant Heart  Transfer specialty notified: Yes  Transfer level: NUMBER 1-5: 2  Bed type requested: stepdown  Isolation status: No active isolations   Admission class or status: OP- Observation      Narrative     69-year-old female with a history of heart transplant in 1993, stroke, chronic diastolic heart failure, breast cancer, C diff, CKD, hypertension, anemia, subclinical hypothyroidism, and coronary artery disease admitted to [facility] on November 12 with chest pain after a dobutamine stress test as an outpatient.  With the chest discomfort she received sublingual nitroglycerin.  After nitroglycerin administration, patient developed wheezing and dyspnea along with seizure-like activity/tremors.  In the ED, she received steroids, famotidine, nebulizers, racemic epinephrine, and Ativan.  She also received Lasix.  With those treatments, symptoms improved.  CT head had no acute abnormalities.  She was admitted with concern for adverse drug reaction  and chest pain after dobutamine stress echocardiogram.  Later in the evening on November 12, a rapid response was called because of seizure-like activity.  She had received morphine for chest pain.  After that she had tachycardia, dyspnea with accessory muscle use, and loss of consciousness with jerking movements.  She was nonverbal during the episode which lasted around 2 minutes.  She subsequently woke and began speaking, and she was not postictal.  Tremor persisted after, but she was alert and oriented with no neurologic deficits.  Oxygen saturations remained stable through the event.  She was given IV Ativan with improved symptoms.  On the morning of November 13, she had another episode of chest pain with left arm numbness, diaphoresis, tachycardia, and tachypnea.  Repeat troponin was 0.031 which was lower than the values from the night prior.  She received pain medication and Ativan.  Weight based Lovenox was given.  With the recurrent chest pain, referring team is requesting transfer to [facility] for Cardiology evaluation.  Transfer center contacted \[facility]\"", and they felt patient was appropriate for Hospital Medicine admit with Cardiology consultation.  Hospital Medicine at [facility] noted patient was in no acute distress.  She is hemodynamically stable, and oxygenation is good on nasal cannula oxygen.  She has no evidence of respiratory distress.  By report, peripheral pulses are intact.      During her stay she underwent Neurology tele-consultation with an assessment of seizure-like activity, suspected non epileptiform.  No AED was indicated at present.  Recommendations included EEG.  Currently she is awake and alert with no new neurologic deficits.    November 13: Lipase 36, magnesium 2.1, phosphorus 4.3, sodium 141, potassium 4.9, chloride 102, CO2 23, BUN 59, creatinine 4.7, glucose 124, albumin 3.5, AST 40, ALT 30, white blood cells 3.19, hemoglobin 9.9, hematocrit 31.1, platelets 204,  troponin 0.031  -chest x-ray had no acute findings.    November 12:  INR 1, , BUN 59, creatinine 4.1, TSH 10.3, free T4-- 1.02, troponin 0.019 (repeats of 0.055 and 0.059)  -chest x-ray had no infiltrates.  Stable mild cardiomegaly.  Clear lungs.  -CT head had no acute intracranial hemorrhage or acute focal brain parenchymal abnormality identified.  Calvarium is intact.  -dobutamine stress echocardiogram had ejection fraction 60-65%.  Indeterminate diastolic function.  Right ventricular systolic function is normal.  Left atrium is severely dilated.  Right atrium is moderately dilated.  Estimated PA systolic pressure is 40 mmHg.  Peak heart rate was 88% of age predicted maximum.  Patient had chest discomfort during the stress test.  Test was stopped due to chest pain.  Stress EKG had no ST segment deviation identified.  There were occasional PVCs.  EKG was negative for ischemia.  No echocardiographic evidence of stress-induced ischemia.    Objective     Vitals: Temp: 98.9 °F (37.2 °C) (11/13/24 0717)  Pulse: 94 (11/13/24 0807)  Resp: 18 (11/13/24 1130)  BP: (!) 160/88 (11/13/24 0717)  SpO2: 100 % (11/13/24 0717)  Recent Labs: CBC:   Recent Labs   Lab 11/12/24  1216 11/13/24  0448   WBC 3.12* 3.19*   HGB 10.1* 9.9*   HCT 30.9* 31.1*    204     CMP:   Recent Labs   Lab 11/12/24  1216 11/13/24  0448    141   K 4.2 4.9    102   CO2 22* 23   GLU 92 124*   BUN 59* 59*   CREATININE 4.1* 4.7*   CALCIUM 9.3 9.4   PROT 8.3 7.8   ALBUMIN 3.8 3.5   BILITOT 0.5 0.4   ALKPHOS 140 107   AST 50* 40   ALT 32 30   ANIONGAP 14 16     Troponin:   Recent Labs   Lab 11/12/24  1808 11/12/24  2153 11/13/24  0824   TROPONINI 0.055* 0.059* 0.031*         Instructions    Admit to Hospital Medicine  Consult Cardiology  Consult Neurology  EEG      LEONARDO Green MD  Hospital Medicine Staff  Cell: [phone]

## 2024-11-13 NOTE — ASSESSMENT & PLAN NOTE
S/P transplant in 1991, transplant team contacted, aware of admission.  Patient managed with cyclosporine    --patient has home med available  --OK to take home med  --Cyclosporine level

## 2024-11-13 NOTE — SIGNIFICANT EVENT
69 year old female, under the care of [facility] Medicine for management of adverse drug reaction, chest pain following a stress test on 11/12. Alerted by primary nurse at 0815 of patient c/o chest pain, left arm numbness. Evaluated patient, diaphoretic, tachycardic, tachypneic in acute distress on exam grabbing left chest. Reports pain sharp, crushing, pressure to left lower chest.   Patient given PRN morphine per MAR    New Orders:  --Troponin  --EKG  --Ativan 2mg IV  --Maalox    No improvement in chest pain with Morphine, Maalox, Ativan.   Trop: 0.031  EKG: Sinus Tach with PACs    New orders:  --GI cocktail  --Dilaudid 1mg x1 dose  --Lovenox 1mg/kg x1 dose    Continued reports of chest pain to left mid to lower chest.     Discussed with Cardiology, prefers patient transfer to [facility] for higher level of care in this medically complicated patient.     New Order:  --PFC facilitated transfer  PFC notified HM they were reviewing case for transfer and will update.     Critical care time spent on the evaluation and treatment of severe organ dysfunction, review of pertinent labs and imaging studies, discussions with consulting providers and discussions with patient/family: 45 minutes.

## 2024-11-13 NOTE — SIGNIFICANT EVENT
Patient complaining of chest pain. RN administered 1mL of morphine per order. Upon administration of morphine pt still complaining or worsening SOB and chest pain. RN turned oxygen up to 3L NC and started to check vital signs. Pt O2 sat was 100% and pulse at 107 before  became visibly dyspneic, using accessory muscles to breathe, and became frantic. Pt then lost consciousness and began to have jerking movements. RN turned pt on side and called a rapid response. Episode lasted for 2 minutes. O2 sat remained at 100% for duration of episode. BS reading was 210, HR went up into 120's. 1 mg of ativan was then given. Upon administration of ativan pt began to feel relief.

## 2024-11-13 NOTE — NURSING
"At change of shift patient was complaining of left arm numbness and tingling. Night shift RN stated that she would message the provider. At 0816 patient again mentioned to RN that her left arm was bothering her with the sensation of numbness and tingling. She stated that her left arm felt "tight" RN messaged provider and they immediately came to bedside for assessment. 0820 with provider at bedside patient began complaining of 10/10 aching chest pain, she was pounding on her chest with her fist and acting restless standing on the side of the bed bending over the bed. Between 0816 and 0900 Received orders for stat labs, EKG, and administered morphine IV, malox, oral lidocaine, ativan IV. [id number] received orders for Dilaudid IV, Pepcid IVPB.     1112 Patient began complaining of aching chest pain again at a 10/10. Messaged provider immediatley and asked if there was anything RN should do besides administer morphine IV. Provider discontinued Morphine IV and placed orders for Dilaudid and Ativan IV. 1130 RN administered these medications. IV at this time was swollen in appearance, RN attempted x2 and then charge nurse attempted x2. Obtained IV access in the R wrist but it was sluggish. At this time it was noted that patient was only breathing 2-4 respirations a minute.1206 notified provider and prepared Narcan. Provider immediately at bedside, narcan administered and patient returned to baseline. Crying that her chest was hurting. Obtained a 20G in the R forearm for future medications. Pain meds adjusted due to respiratory depression episode.    "

## 2024-11-13 NOTE — ASSESSMENT & PLAN NOTE
Patient has Diastolic (HFpEF) heart failure that is Chronic. On presentation their CHF was well compensated. Most recent BNP and echo results are listed below.  Recent Labs     11/12/24  1216   *       Latest ECHO  Results for orders placed during the hospital encounter of 07/19/24    Echo Saline Bubble? Yes    Interpretation Summary    Left Ventricle: The left ventricle is normal in size. Normal wall thickness. There is normal systolic function with a visually estimated ejection fraction of 55 - 60%. There is normal diastolic function.    Right Ventricle: Normal right ventricular cavity size. Wall thickness is normal. Systolic function is normal.    Left Atrium: Patent foramen ovale visualized with predominant right to left shunting indicated by saline contrast.    Tricuspid Valve: There is mild regurgitation.    IVC/SVC: Normal venous pressure at 3 mmHg.    The patient is status post cardiac transplantation.    Current Heart Failure Medications  carvediloL tablet 3.125 mg, 2 times daily with meals, Oral    Plan  - Monitor strict I&Os and daily weights.    - Place on telemetry  - Low sodium diet  - Place on fluid restriction of 1.5 L.   - Cardiology has been consulted  - The patient's volume status is at their baseline

## 2024-11-13 NOTE — ASSESSMENT & PLAN NOTE
Patient undergoing nuclear stress test, developed chest pain/chest discomfort following dobutamine stress test.  Patient then given nitro and became short of breath with wheezing, seizure-like activity.    --new allergy nitroglycerin  --tele  --vitals q.4  --diphenhydramine 25 mg IV q.6 p.r.n. allergic reaction  --Improved

## 2024-11-13 NOTE — ASSESSMENT & PLAN NOTE
Felt secondary to adverse drug reaction from dobutamine and nitro.  Patient has history of heart transplant/denervated so will not experience typical angina.  Initial trop negative  Worsened on 11/13, repeat EKG, repeat troponin ordered.     --trend troponin- completed- flat  --continue home medications  --transplant team was contacted and is aware of admission  --Plan for transfer to [facility] for higher level of care

## 2024-11-13 NOTE — PLAN OF CARE
[facility] - Telemetry (Hospital)  Initial Discharge Assessment       Primary Care Provider: Elis Wick MD    Admission Diagnosis: Wheezing [R06.2]  Chronic combined systolic and diastolic heart failure [I50.42]  CKD (chronic kidney disease) stage 4, GFR 15-29 ml/min [N18.4]  History of heart transplant [Z94.1]  Chest pain [R07.9]  Allergic reaction, initial encounter [T78.40XA]  Chronic hypertension [I10]  Anxiety about health [R45.89]  Immunocompromised state due to drug therapy [D84.821, Z79.899]    Admission Date: 11/12/2024  Expected Discharge Date:     Transition of Care Barriers: (P) None    Payor: [employer] MEDICARE / Plan: [employer] PPO SPECIAL NEEDS / Product Type: Medicare Advantage /     Extended Emergency Contact Information  Primary Emergency Contact: BirgitMoy  Mobile Phone: [phone]  Relation: Healthcare Power of   Secondary Emergency Contact: Kierra Mcconnell  Mobile Phone: [phone]  Relation: Friend    Discharge Plan A: (P) [facility]         [id number] - [address] [facility] [place] [address] [place] [address]  Phone: [phone] Fax: [phone]    [facility] - [facility] [address]  [address] [address]  Phone: [phone] Fax: [phone]    [facility] [address] [place] [address]  Phone: [phone] Fax: [phone]      Initial Assessment (most recent)       Adult Discharge Assessment - 11/13/24 1623          Discharge Assessment    Assessment Type Discharge Planning Assessment (P)      Confirmed/corrected address, phone number and insurance Yes (P)      Confirmed Demographics Correct on Facesheet (P)      Source of Information patient (P)      When was your last doctors appointment? 10/16/24 (P)      Communicated RATNA with patient/caregiver Date not available/Unable to determine (P)      Reason  For Admission Chest Pain/SOB (P)      People in Home alone (P)      Facility Arrived From: Cardiologist office (P)      Do you expect to return to your current living situation? Yes (P)      Do you have help at home or someone to help you manage your care at home? No (P)      Prior to hospitilization cognitive status: Alert/Oriented (P)      Current cognitive status: Alert/Oriented (P)      Walking or Climbing Stairs Difficulty yes (P)      Walking or Climbing Stairs ambulation difficulty, requires equipment (P)      Mobility Management uses cane (P)      Dressing/Bathing Difficulty yes (P)      Dressing/Bathing bathing difficulty, requires equipment (P)      Dressing/Bathing Management uses bath bench (P)      Equipment Currently Used at Home bath bench;cane, straight (P)      Readmission within 30 days? No (P)      Patient currently being followed by outpatient case management? No (P)      Do you currently have service(s) that help you manage your care at home? No (P)      Do you take prescription medications? Yes (P)      Do you have prescription coverage? Yes (P)      Coverage [employer] (P)      Do you have any problems affording any of your prescribed medications? No (P)      Who is going to help you get home at discharge? friend (P)      How do you get to doctors appointments? car, drives self;other (see comments) (P)    Medical transport    Are you on dialysis? No (P)      Do you take coumadin? No (P)      Discharge Plan A Home Health (P)      DME Needed Upon Discharge  none (P)      Discharge Plan discussed with: Patient;Friend (P)      Name(s) and Number(s) Kierra Mcconnell (P)      Transition of Care Barriers None (P)                    Met with patient and friend, Kierra.  Patient lives alone and was independent with ADL's.  She has several friend who can assist at discharge if needed.  Discharge plan is based on hospital progress.  Patient may be transferred to [facility] for higher level of  care.

## 2024-11-13 NOTE — PLAN OF CARE
A207/A207 NAYEYaneth Damon is a 69 y.o.female admitted on 11/12/2024 for Adverse drug reaction   Code Status: Full Code MRN: [id number]   Review of patient's allergies indicates:   Allergen Reactions    Lisinopril Swelling and Rash    Nitro Shortness Of Breath     Audible wheezing     Hydrocodone-acetaminophen Nausea Only    Augmentin [amoxicillin-pot clavulanate] Diarrhea    Zyvox [linezolid] Nausea And Vomiting     Past Medical History:   Diagnosis Date    Abdominal wall hernia     CT Renal 6/11/2018---Small fat containing superior ventral abdominal wall hernia at the epicardial pacing lead site.    Abnormal mammogram 10/12/2021    Acute cystitis without hematuria 05/10/2022    Acute ischemic right middle cerebral artery (MCA) stroke 07/20/2024    GRACE (acute kidney injury) 11/22/2021    Anxiety     Aphasia 07/19/2024    Arthritis     ZEN HIPS    Breast cancer in female 08/2021    LEFT BREAST    Bronchitis 08/18/2016    Never smoked      C. difficile colitis 11/29/2021    Cellulitis of axilla, left 12/23/2021    Chronic diastolic heart failure 12/16/2021    Chronic kidney disease     stage 4, GFR 15-29 ml/min    Chronic midline low back pain without sciatica 06/18/2018    Closed nondisplaced fracture of distal phalanx of left great toe with routine healing 10/22/2018    Coronary artery disease 1993    heart transplant    COVID-19 in immunocompromised patient 02/26/2024    Cystitis 05/10/2022    Depression     Encounter for blood transfusion     Encounter for palliative care 10/14/2024    Fibromyalgia     on Lyrica    Heart failure     native heart cardiomyopathy    Heart transplanted 1993    due to cardiomyopathy    History of hyperparathyroidism; Hyperparathyroidism, secondary renal     PT DENIES    Hypertension     Immune disorder     anti rejection meds    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    Impaired mobility 07/28/2022    Iron deficiency anemia 08/15/2017    Kidney stones     passed  per pt    Obesity     Obesity (BMI 30.0-34.9) 07/22/2019    Other osteoporosis without current pathological fracture 08/30/2019    Other pancytopenia 05/06/2024    Parotitis, acute 09/19/2023    Pharyngitis 01/09/2019    Pneumonia due to infectious organism 03/14/2024    PONV (postoperative nausea and vomiting)     Severe sepsis 11/22/2021    Shingles 2003 approx    left leg    Subclinical hypothyroidism 06/16/2023    Thrombocytopenia, unspecified 11/29/2021    Trouble in sleeping     Urinary incontinence       PRN meds    acetaminophen, 650 mg, Q4H PRN  ALPRAZolam, 0.25 mg, TID PRN  aluminum-magnesium hydroxide-simethicone, 30 mL, QID PRN  dextrose 10%, 12.5 g, PRN  dextrose 10%, 25 g, PRN  diphenhydrAMINE, 25 mg, Q6H PRN  glucagon (human recombinant), 1 mg, PRN  glucose, 16 g, PRN  glucose, 24 g, PRN  morphine, 4 mg, Q2H PRN  naloxone, 0.02 mg, PRN  ondansetron, 4 mg, Q8H PRN  polyethylene glycol, 17 g, BID PRN  prochlorperazine, 5 mg, Q6H PRN  racepinephrine, 0.5 mL, Q4H PRN  sodium chloride 0.9%, 10 mL, Q12H PRN      Chart check completed. Will continue plan of care.      Orientation: oriented x 4  Meka Coma Scale Score: 15     Lead Monitored: Lead II Rhythm: normal sinus rhythm    Cardiac/Telemetry Box Number: [id number]  VTE Core Measure: (SCDs) Sequential compression device initiated/maintained Last Bowel Movement: 11/11/24  Diet Cardiac  Voiding Characteristics: external catheter  Gabe Score: 21  Fall Risk Score: 10  Accucheck []   Freq?      Lines/Drains/Airways       Peripheral Intravenous Line  Duration                  Peripheral IV - Single Lumen 11/12/24 1025 24 G Anterior;Distal;Right Upper Arm <1 day         Peripheral IV - Single Lumen 11/12/24 1202 18 G 1 1/4 in Anterior;Left Upper Arm <1 day

## 2024-11-13 NOTE — PROGRESS NOTES
"[facility] - Telemetry ([facility])  Cardiology  Progress Note    Patient Name: Nadia Damon  MRN: [id number]  Admission Date: 11/12/2024  Hospital Length of Stay: 0 days  Code Status: Full Code   Attending Physician: Ephraim Malin MD   Primary Care Physician: Elis Wick MD  Expected Discharge Date:   Principal Problem:Adverse drug reaction    Subjective:   HPI:  Ms. Damon is a 69 year old female patient whose current medical conditions include HTN, CKD stage IV, CAD, breast CA, GUSTAVO, CHF, prior CVA and history of heart transplant in 1991 who was sent to [facility] ED via EMS from cardiology clinic today due to chest pain that onset after a dobutamine stress test. Patient reported feeling a "squeezing, heavy" discomfort during her stress echo that persisted even after dobutamine had been discontinued. Given chest pain symptoms, nitro was administered x 2. Shortly thereafter, patient became markedly wheezy and SOB and had seizure like-activity/tremors, prompting the EMS call. She received steroids, racemic epinephrine, and ativan upon arrival to ED with improvement in her symptoms. Cardiology was consulted to assist with management. Patient seen and examined today in ED. Remains very anxious/jittery. Still mildly SOB and endorses slight chest discomfort although much improved from earlier today. She reports compliance with her home medications. Followed by advanced CHF/transplant team (Dr. Tubbs). Chart reviewed. Initial troponin negative. BNP > 700. CT of head with NAF. TTE 7/24 with normal EF, small PFO. Stress images/echo not completed today given acuity of situation/patient's symptoms. Transplant team aware of patient's admission at this facility.     Hospital Course:   11/13/24-Patient seen and examined today, sitting up in bedside chair. Episode of unresponsiveness/seizure-like activity overnight. Neurology consulted, EEG pending. Reported CP and pain under her left arm this AM, relieved after receiving " ativan, GI cocktail, and morphine. Looks/feels better at time of exam. Troponin flat/trending down. EKG without any dynamic changes. Creatinine up to 4.7. Transplant team made aware of patient's admission/current condition, transfer being discussed.         Review of Systems   Constitutional: Positive for malaise/fatigue.   HENT: Negative.     Eyes: Negative.    Cardiovascular:  Positive for chest pain (resolved at time of visit) and dyspnea on exertion.   Respiratory: Negative.     Endocrine: Negative.    Hematologic/Lymphatic: Negative.    Gastrointestinal: Negative.    Genitourinary: Negative.    Neurological: Negative.      Objective:     Vital Signs (Most Recent):  Temp: 98.9 °F (37.2 °C) (11/13/24 0717)  Pulse: 94 (11/13/24 0807)  Resp: 16 (11/13/24 0908)  BP: (!) 160/88 (11/13/24 0717)  SpO2: 100 % (11/13/24 0717) Vital Signs (24h Range):  Temp:  [97.5 °F (36.4 °C)-99 °F (37.2 °C)] 98.9 °F (37.2 °C)  Pulse:  [] 94  Resp:  [16-26] 16  SpO2:  [97 %-100 %] 100 %  BP: (110-192)/(58-96) 160/88     Weight: 67.4 kg (148 lb 9.4 oz)  Body mass index is 27.18 kg/m².     SpO2: 100 %         Intake/Output Summary (Last 24 hours) at 11/13/2024 1126  Last data filed at 11/13/2024 0710  Gross per 24 hour   Intake 264 ml   Output 800 ml   Net -536 ml       Lines/Drains/Airways       Peripheral Intravenous Line  Duration                  Peripheral IV - Single Lumen 11/12/24 1025 24 G Anterior;Distal;Right Upper Arm 1 day         Peripheral IV - Single Lumen 11/12/24 1202 18 G 1 1/4 in Anterior;Left Upper Arm <1 day                       Physical Exam  Vitals and nursing note reviewed.   Constitutional:       General: She is not in acute distress.     Appearance: She is well-developed. She is not diaphoretic.   HENT:      Head: Normocephalic and atraumatic.   Eyes:      General:         Right eye: No discharge.         Left eye: No discharge.      Pupils: Pupils are equal, round, and reactive to light.   Cardiovascular:       Rate and Rhythm: Normal rate and regular rhythm.      Heart sounds: Normal heart sounds, S1 normal and S2 normal. No murmur heard.  Pulmonary:      Effort: Pulmonary effort is normal. No respiratory distress.      Breath sounds: Rhonchi present.   Abdominal:      General: There is no distension.      Comments: +hernia     Musculoskeletal:      Right lower leg: No edema.      Left lower leg: No edema.   Skin:     General: Skin is warm and dry.      Findings: No erythema.   Neurological:      Mental Status: She is alert and oriented to person, place, and time.   Psychiatric:         Behavior: Behavior normal.      Comments: Mildly anxious            Significant Labs: CMP   Recent Labs   Lab 11/12/24  1216 11/13/24  0448    141   K 4.2 4.9    102   CO2 22* 23   GLU 92 124*   BUN 59* 59*   CREATININE 4.1* 4.7*   CALCIUM 9.3 9.4   PROT 8.3 7.8   ALBUMIN 3.8 3.5   BILITOT 0.5 0.4   ALKPHOS 140 107   AST 50* 40   ALT 32 30   ANIONGAP 14 16   , CBC   Recent Labs   Lab 11/12/24  1216 11/13/24  0448   WBC 3.12* 3.19*   HGB 10.1* 9.9*   HCT 30.9* 31.1*    204   , Troponin   Recent Labs   Lab 11/12/24  1808 11/12/24  2153 11/13/24  0824   TROPONINI 0.055* 0.059* 0.031*   , and All pertinent lab results from the last 24 hours have been reviewed.    Significant Imaging: Echocardiogram: Transthoracic echo (TTE) complete (Cupid Only):   Results for orders placed or performed during the hospital encounter of 07/19/24   Echo Saline Bubble? Yes   Result Value Ref Range    BSA 1.83 m2    LVOT stroke volume 81.51 cm3    LVIDd 4.09 3.5 - 6.0 cm    LV Systolic Volume 21.41 mL    LV Systolic Volume Index 12.0 mL/m2    LVIDs 2.46 2.1 - 4.0 cm    LV Diastolic Volume 73.65 mL    LV Diastolic Volume Index 41.38 mL/m2    Left Ventricular End Systolic Volume by Teichholz Method 21.41 mL    Left Ventricular End Diastolic Volume by Teichholz Method 73.65 mL    IVS 1.08 0.6 - 1.1 cm    LVOT diameter 2.01 cm    LVOT area 3.2  cm2    FS 40 28 - 44 %    Left Ventricle Relative Wall Thickness 0.43 cm    PW 0.87 0.6 - 1.1 cm    LV mass 127.01 g    LV Mass Index 71 g/m2    MV Peak E Jacky 0.82 m/s    TDI LATERAL 0.13 m/s    TDI SEPTAL 0.07 m/s    E/E' ratio 8.20 m/s    MV Peak A Jacky 0.72 m/s    TR Max Jacky 2.8 m/s    E/A ratio 1.14     IVRT 91.34 msec    E wave deceleration time 168.57 msec    LV SEPTAL E/E' RATIO 11.71 m/s    LV LATERAL E/E' RATIO 6.31 m/s    LVOT peak jacky 1.28 m/s    Left Ventricular Outflow Tract Mean Velocity 0.89 cm/s    Left Ventricular Outflow Tract Mean Gradient 3.61 mmHg    RVOT peak VTI 11.8 cm    TAPSE 1.56 cm    RV/LV Ratio 0.79 cm    LA size 4.06 cm    AV mean gradient 6 mmHg    AV peak gradient 10 mmHg    Ao peak jacky 1.60 m/s    Ao VTI 35.80 cm    LVOT peak VTI 25.70 cm    AV valve area 2.28 cm²    AV Velocity Ratio 0.80     AV index (prosthetic) 0.72     PRESLEY by Velocity Ratio 2.54 cm²    Triscuspid Valve Regurgitation Peak Gradient 31 mmHg    PV mean gradient 1 mmHg    PV PEAK VELOCITY 0.77 m/s    PV peak gradient 2 mmHg    Pulmonary Valve Mean Velocity 0.63 m/s    RVOT peak jacky 0.63 m/s    Ao root annulus 3.18 cm    STJ 2.98 cm    Ascending aorta 3.30 cm    Mean e' 0.10 m/s    ZLVIDS -1.68     ZLVIDD -1.85     RVDD 3.23 cm    TV resting pulmonary artery pressure 34 mmHg    RV TB RVSP 6 mmHg    Est. RA pres 3 mmHg    Narrative      Left Ventricle: The left ventricle is normal in size. Normal wall   thickness. There is normal systolic function with a visually estimated   ejection fraction of 55 - 60%. There is normal diastolic function.    Right Ventricle: Normal right ventricular cavity size. Wall thickness   is normal. Systolic function is normal.    Left Atrium: Patent foramen ovale visualized with predominant right to   left shunting indicated by saline contrast.    Tricuspid Valve: There is mild regurgitation.    IVC/SVC: Normal venous pressure at 3 mmHg.    The patient is status post cardiac  transplantation.      and EKG: reviewed  Assessment and Plan:   Patient who presents with CP/SOB/adverse drug reaction s/p dobutamine stress echo. Episode of seizure-like activity overnight and recurrent CP this AM. Improved at time of exam. Trop flat/trending down. EKG without acute changes, EF normal. Neurology on board. Transfer being discussed.     * Adverse drug reaction  -Avoid nitrates      Unresponsiveness  -Episode of unresponsiveness ? Seizure like activity overnight  -Neurology consulted, EEG pending    SOB (shortness of breath)  -Likely medication reaction due to dobutamine/nitro allergy  -Improved since admission, received steroids, racemic epinephrine  -Agree with dose of IV Lasix    11/13/24  -Improved      Chest pain  -Likely due to adverse drug reaction from dobutamine stress/nitro allergy  -Improved during exam  -Patient with history of heart transplant/denervated so will not experience typical angina  -Initial troponin negative, repeat pending  -EKG reviewed, no acute ischemic changes noted, chronic RBBB  -Continue OMT  -Transplant team aware of admission    11/13/24  -Unclear etiology, improved at time of exam  -Troponin flat/trending down  -EF normal on rest images yesterday  -No dynamic changes noted on EKG  -Continue OMT  -Transplant team aware, transfer being discussed    Chronic diastolic (congestive) heart failure  -BNP higher than baseline  -Received IV Lasix in ED, assess response  -Continue other home CV meds    11/13/24  -Creatinine bumped, diuretics held    Stage 4 chronic kidney disease  -Monitor, per primary team    Essential hypertension  -Resume home regimen as tolerated    Heart transplanted  -Continue home immuno-suppressive regimen        VTE Risk Mitigation (From admission, onward)           Ordered     IP VTE HIGH RISK PATIENT  Once         11/12/24 1527     Place sequential compression device  Until discontinued         11/12/24 1527                    Nilda Rehman,  SALENA  Cardiology  [facility] - Telemetry ([facility])

## 2024-11-13 NOTE — TELEMEDICINE CONSULT
"[facility]   General Neurology  Consult Note      Consult Information  Inpatient consult to Telemedicine-General Neurology  Consult performed by: Quincy Kenyon MD  Consult ordered by: Tanisha Alvarez NP          Consulting Provider: TOVA CHENEY JR   Current Providers  No providers found    Patient Location:  [facility] MED/TELE 2 IP Unit    Spoke hospital nurse at bedside with patient assisting consultant.  Patient information was obtained from patient and past medical records.       Neurology Documentation  Acute Stroke Times   Stroke Team Called Date: 11/13/24  Stroke Team Called Time: 0900  Stroke Team Arrival Date: 11/13/24  Stroke Team Arrival Time: 0930    Blood pressure (!) 160/88, pulse 94, temperature 98.9 °F (37.2 °C), temperature source Oral, resp. rate 16, height 5' 2" (1.575 m), weight 67.4 kg (148 lb 9.4 oz), last menstrual period 06/20/1983, SpO2 100%.    Medical Decision Making  HPI:  69 y.o. female with medical history of HTN, CKD stage IV, CAD, breast CA, GUSTAVO, CHF, prior CVA, heart transplant in 1991- with admission concerns of chest pain evaluation. Hospital course complicated by seizure like activity. And neurology has been consulted for the same.     History from patient / medical records review. Per report - nonverbal during episode which lasted around 2 minutes. Toward end of event patient grabbed one of providers hands and immediately began speaking. She was not post-ictal. Patient continued to shake but was awake and oriented x 4 without neurological deficits. Oxygen saturation remain 100% through event. -120's. 1mg Ativan IV given with gradual relief in symptoms. She reports increased physical symptoms related to increase pain or anxiety. Prior to event nurse reports patient complained of dyspnea while checking vitals.     No obvious generalized tonic clonic event - with clear focal onset / secondary generalization movements. No associated presentation of loss of bladder " continence /  shoulder dislocation. No tongue biting. No prodromal symptoms. Reported of stress and anxiety prior to the event / related to poor sleep pattern overnight despite home sleep aid medications; with resultant need for added dosing of BZD. Otherwise - No substance abuse. No history of epilepsy. No family history of epilepsy. No history of strokes or complex migraines.     Images personally reviewed and interpreted:  Study: Head CT  Study Interpretation: No acute findings     Additional studies reviewed:   Study: Cardiac_Neuro: EEG  Study Interpretation:  Pending    Laboratory studies reviewed:  BMP:   Lab Results   Component Value Date     11/13/2024    K 4.9 11/13/2024     11/13/2024    CO2 23 11/13/2024    BUN 59 (H) 11/13/2024    CREATININE 4.7 (H) 11/13/2024    CALCIUM 9.4 11/13/2024     CBC:   Lab Results   Component Value Date    WBC 3.19 (L) 11/13/2024    RBC 3.49 (L) 11/13/2024    HGB 9.9 (L) 11/13/2024    HCT 31.1 (L) 11/13/2024     11/13/2024    MCV 89 11/13/2024    MCH 28.4 11/13/2024    MCHC 31.8 (L) 11/13/2024     Lipid Panel:   Lab Results   Component Value Date    CHOL 142 07/20/2024    LDLCALC 81.0 07/20/2024    HDL 44 07/20/2024    TRIG 85 07/20/2024     Coagulation:   Lab Results   Component Value Date    INR 1.0 11/12/2024    INR 1.1 05/24/2006    APTT 28.3 11/12/2024     Hgb A1C:   Lab Results   Component Value Date    HGBA1C 5.1 07/02/2024     TSH:   Lab Results   Component Value Date    TSH 10.030 (H) 11/12/2024       Documentation personally reviewed:  Notes: Notes: H&P     Assessment and plan:  Recs:  Seizure like activity - suspect non epileptiform rather epileptiform activity     EEG > 1 HR/rotuine   Need no AED at the moment. If EEG negative no further interventions or investigations needed from neuro standpoint. If any concerns for epileptiform activity - can consider keppra according to renal based dosing.     - Prn ativan 1-2 mg IV if GTC or focal complex  with secondary generalization > 1 mins / repeat ativan every 2 mins to .1mg/kg for uncontrolled seizures    -- Correct lytes as needed / control infection if any / avoid hypoxia or hypercapnia / avoid hypoglycemia   -- Correct any nutritional deficiencies / correct anemia / provide nutritional support as appropriate / ambulate when appropriate - PT/OT recs   -- seizure precautions      Provide seizure counseling provided during the visit - Identification of signs; emergency action and ER attention; risk factor control if any, avoidance of sleep deprivation, environmental modifications / optimization for secondary injury prevention; compliance to medications     Post charge discharge plan:  Clinic follow up: None    Visit Type: N/A  Timeframe: N/A        Additional Physical Exam, History, & ROS  ROS  Physical Exam    Awake alert following commands   Oriented x 3   No aphasia or cortical signs   No focal motor or sensory deficits or cranial nerve deficits   No resting tremor / Dyskinesia    Past Medical History:   Diagnosis Date    Abdominal wall hernia     CT Renal 6/11/2018---Small fat containing superior ventral abdominal wall hernia at the epicardial pacing lead site.    Abnormal mammogram 10/12/2021    Acute cystitis without hematuria 05/10/2022    Acute ischemic right middle cerebral artery (MCA) stroke 07/20/2024    GRACE (acute kidney injury) 11/22/2021    Anxiety     Aphasia 07/19/2024    Arthritis     ZEN HIPS    Breast cancer in female 08/2021    LEFT BREAST    Bronchitis 08/18/2016    Never smoked      C. difficile colitis 11/29/2021    Cellulitis of axilla, left 12/23/2021    Chronic diastolic heart failure 12/16/2021    Chronic kidney disease     stage 4, GFR 15-29 ml/min    Chronic midline low back pain without sciatica 06/18/2018    Closed nondisplaced fracture of distal phalanx of left great toe with routine healing 10/22/2018    Coronary artery disease 1993    heart transplant    COVID-19 in  immunocompromised patient 02/26/2024    Cystitis 05/10/2022    Depression     Encounter for blood transfusion     Encounter for palliative care 10/14/2024    Fibromyalgia     on Lyrica    Heart failure     native heart cardiomyopathy    Heart transplanted 1993    due to cardiomyopathy    History of hyperparathyroidism; Hyperparathyroidism, secondary renal     PT DENIES    Hypertension     Immune disorder     anti rejection meds    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    Impaired mobility 07/28/2022    Iron deficiency anemia 08/15/2017    Kidney stones     passed per pt    Obesity     Obesity (BMI 30.0-34.9) 07/22/2019    Other osteoporosis without current pathological fracture 08/30/2019    Other pancytopenia 05/06/2024    Parotitis, acute 09/19/2023    Pharyngitis 01/09/2019    Pneumonia due to infectious organism 03/14/2024    PONV (postoperative nausea and vomiting)     Severe sepsis 11/22/2021    Shingles 2003 approx    left leg    Subclinical hypothyroidism 06/16/2023    Thrombocytopenia, unspecified 11/29/2021    Trouble in sleeping     Urinary incontinence      Past Surgical History:   Procedure Laterality Date    bladder implant Right 06/11/2024    BLADDER SURGERY  2015 approx    mesh - Dr Everett then 2nd reconstructive sx Dr Onofre    BREAST BIOPSY Bilateral     NEGATIVE    BREAST BIOPSY Right 10/31/2022    benign    BREAST LUMPECTOMY Left 2021    BREAST SURGERY Left 09/28/2015    Bx - benign    BREAST SURGERY Right 12/2015    Bx benign    CARDIAC PACEMAKER REMOVAL Left 06/26/2014    Pacer defirillator removed. Put in 1993 aat time of heart transplant    CARPAL TUNNEL RELEASE Left 03/03/2015    Dr. Hall    COLONOSCOPY N/A 02/25/2021    Procedure: COLONOSCOPY;  Surgeon: Freida Ramirez MD;  Location: [facility];  Service: Endoscopy;  Laterality: N/A;    CYSTOCELE REPAIR      Twice with mesh removal    EPIDURAL STEROID INJECTION INTO CERVICAL SPINE N/A 02/02/2023    Procedure:  T11/T12 IL HELLEN;  Surgeon: Jassi Pierre MD;  Location: [facility] PAIN [facility]GT;  Service: Pain Management;  Laterality: N/A;    EPIDURAL STEROID INJECTION INTO CERVICAL SPINE N/A 9/16/2024    Procedure: T11/T12 IL HELLEN;  Surgeon: Jassi Pierre MD;  Location: [facility];  Service: Pain Management;  Laterality: N/A;    HEART TRANSPLANT  1993    HERNIA REPAIR Right 1971 approx    Inguinal    HYSTERECTOMY  1983    vag hyst /LSO     IMPLANTATION OF PERMANENT SACRAL NERVE STIMULATOR N/A 06/11/2024    Procedure: INSERTION, NEUROSTIMULATOR, PERMANENT, SACRAL;  Surgeon: Sami Cochran MD;  Location: [facility] OR;  Service: Urology;  Laterality: N/A;    INCISION AND DRAINAGE OF ABSCESS Left 12/24/2021    Procedure: INCISION AND DRAINAGE, ABSCESS;  Surgeon: Joseph Longo MD;  Location: [facility] OR;  Service: General;  Laterality: Left;    INJECTION OF ANESTHETIC AGENT AROUND MEDIAL BRANCH NERVES INNERVATING LUMBAR FACET JOINT Right 10/19/2022    Procedure: Right L4/L5 and L5/S1 MBB;  Surgeon: Jassi Pierre MD;  Location: [facility]IN [facility];  Service: Pain Management;  Laterality: Right;    INJECTION OF ANESTHETIC AGENT AROUND MEDIAL BRANCH NERVES INNERVATING LUMBAR FACET JOINT Right 11/09/2022    Procedure: Right L4/L5 and L5/S1 MBB;  Surgeon: Jassi Pierre MD;  Location: [facility];  Service: Pain Management;  Laterality: Right;    INJECTION OF ANESTHETIC AGENT INTO SACROILIAC JOINT Right 08/22/2022    Procedure: Right SIJ Injection Right L5/S1 Facte Injection;  Surgeon: Jassi Pierre MD;  Location: [facility];  Service: Pain Management;  Laterality: Right;    INSERTION OF TUNNELED CENTRAL VENOUS CATHETER (CVC) WITH SUBCUTANEOUS PORT N/A 11/09/2021    Procedure: [id number];  Surgeon: Christoph Douglas MD;  Location: [facility] OR;  Service: General;  Laterality: N/A;    RADIOFREQUENCY THERMOCOAGULATION Right 12/07/2022    Procedure: Right L4/L5 and L5/S1 Lumbar RFA;  Surgeon: Jassi Pierre MD;  Location:  [facility] PAIN [facility]GT;  Service: Pain Management;  Laterality: Right;    REMOVAL OF VASCULAR ACCESS PORT      ROBOT-ASSISTED LAPAROSCOPIC ABDOMINAL SACROCOLPOPEXY N/A 08/10/2023    Procedure: ROBOTIC SACROCOLPOPEXY, ABDOMEN;  Surgeon: PRANAY Villalobos MD;  Location: [facility] OR;  Service: OB/GYN;  Laterality: N/A;    ROBOT-ASSISTED LAPAROSCOPIC OOPHORECTOMY Right 08/10/2023    Procedure: ROBOTIC OOPHORECTOMY;  Surgeon: PRANAY Villalobos MD;  Location: [facility] OR;  Service: OB/GYN;  Laterality: Right;    SENTINEL LYMPH NODE BIOPSY Left 10/12/2021    Procedure: BIOPSY, LYMPH NODE, SENTINEL;  Surgeon: Christoph Douglas MD;  Location: [facility] OR;  Service: General;  Laterality: Left;    TOE SURGERY      XI ROBOTIC URETHROPEXY N/A 08/10/2023    Procedure: XI ROBOTIC URETHROPEXY;  Surgeon: PRANAY Villalobos MD;  Location: [facility] OR;  Service: OB/GYN;  Laterality: N/A;     Family History   Problem Relation Name Age of Onset    Cancer Mother  38        breast    Breast cancer Mother      Breast cancer Maternal Grandmother      Heart disease Maternal Grandmother      Hypertension Son      Cataracts Cousin      Diabetes Neg Hx      Stroke Neg Hx      Kidney disease Neg Hx      Asthma Neg Hx      COPD Neg Hx      Melanoma Neg Hx      Hyperlipidemia Neg Hx         Diagnoses  Problem Noted   Adverse Drug Reaction 11/12/2024       Quincy Kenyon MD    Neurology consultation requested by spoke provider. Audiovisual encounter with the patient performed using a secure connection.  Results and impressions from the visit are documented on this note and were communicated to the consulting provider/team via direct communication. The note has been shared for addition to the patients electronic medical record.

## 2024-11-13 NOTE — SUBJECTIVE & OBJECTIVE
Interval History: Worsened chest pain, tremors/seizure like activity. Plan to transfer to [facility], pending bed assignment.     Review of Systems   Constitutional:  Positive for activity change and fatigue.   Respiratory:  Positive for chest tightness and shortness of breath.    Cardiovascular:  Positive for chest pain and palpitations.   Neurological:  Positive for weakness.   Psychiatric/Behavioral:  The patient is nervous/anxious.      Objective:     Vital Signs (Most Recent):  Temp: 98.3 °F (36.8 °C) (11/13/24 1228)  Pulse: 87 (11/13/24 1228)  Resp: 16 (11/13/24 1228)  BP: (!) 150/84 (11/13/24 1228)  SpO2: 100 % (11/13/24 1228) Vital Signs (24h Range):  Temp:  [98.3 °F (36.8 °C)-99 °F (37.2 °C)] 98.3 °F (36.8 °C)  Pulse:  [] 87  Resp:  [16-26] 16  SpO2:  [97 %-100 %] 100 %  BP: (110-186)/(58-92) 150/84     Weight: 67.4 kg (148 lb 9.4 oz)  Body mass index is 27.18 kg/m².    Intake/Output Summary (Last 24 hours) at 11/13/2024 1235  Last data filed at 11/13/2024 0710  Gross per 24 hour   Intake 240 ml   Output 800 ml   Net -560 ml         Physical Exam  Vitals and nursing note reviewed.   Constitutional:       General: She is in acute distress.      Appearance: She is overweight. She is ill-appearing.      Interventions: Nasal cannula in place.   HENT:      Head: Normocephalic and atraumatic.      Right Ear: Hearing and external ear normal.      Left Ear: Hearing and external ear normal.      Nose: No rhinorrhea.      Right Sinus: No maxillary sinus tenderness or frontal sinus tenderness.      Left Sinus: No maxillary sinus tenderness or frontal sinus tenderness.      Mouth/Throat:      Mouth: No oral lesions.      Pharynx: Uvula midline.   Eyes:      General:         Right eye: No discharge.         Left eye: No discharge.      Conjunctiva/sclera: Conjunctivae normal.      Pupils: Pupils are equal, round, and reactive to light.   Neck:      Thyroid: No thyromegaly.      Vascular: No carotid bruit.       Trachea: No tracheal deviation.   Cardiovascular:      Rate and Rhythm: Tachycardia present. Rhythm irregular.      Pulses:           Dorsalis pedis pulses are 2+ on the right side and 2+ on the left side.      Heart sounds: S1 normal and S2 normal. Murmur heard.   Pulmonary:      Effort: Pulmonary effort is normal. Tachypnea present.      Breath sounds: Normal breath sounds.   Abdominal:      General: Bowel sounds are normal. There is no distension.      Palpations: Abdomen is soft. There is no mass.      Tenderness: There is no abdominal tenderness.   Musculoskeletal:         General: Normal range of motion.      Cervical back: Normal range of motion.   Lymphadenopathy:      Cervical: No cervical adenopathy.      Upper Body:      Right upper body: No supraclavicular adenopathy.      Left upper body: No supraclavicular adenopathy.   Skin:     General: Skin is warm and dry.      Capillary Refill: Capillary refill takes less than 2 seconds.      Findings: No rash.   Neurological:      Mental Status: She is alert and oriented to person, place, and time.   Psychiatric:         Mood and Affect: Mood is anxious. Mood is not depressed.         Speech: Speech is rapid and pressured.         Behavior: Behavior is hyperactive.         Thought Content: Thought content normal.         Judgment: Judgment normal.             Significant Labs: All pertinent labs within the past 24 hours have been reviewed.  CBC:   Recent Labs   Lab 11/12/24  1216 11/13/24  0448   WBC 3.12* 3.19*   HGB 10.1* 9.9*   HCT 30.9* 31.1*    204     CMP:   Recent Labs   Lab 11/12/24  1216 11/13/24  0448    141   K 4.2 4.9    102   CO2 22* 23   GLU 92 124*   BUN 59* 59*   CREATININE 4.1* 4.7*   CALCIUM 9.3 9.4   PROT 8.3 7.8   ALBUMIN 3.8 3.5   BILITOT 0.5 0.4   ALKPHOS 140 107   AST 50* 40   ALT 32 30   ANIONGAP 14 16     Cardiac Markers:   Recent Labs   Lab 11/12/24  1216   *     Troponin:   Recent Labs   Lab 11/12/24  1808  11/12/24  2153 11/13/24  0824   TROPONINI 0.055* 0.059* 0.031*       Significant Imaging: I have reviewed all pertinent imaging results/findings within the past 24 hours.

## 2024-11-13 NOTE — ASSESSMENT & PLAN NOTE
Creatine stable for now. BMP reviewed- noted Estimated Creatinine Clearance: 10 mL/min (A) (based on SCr of 4.7 mg/dL (H)). according to latest data. Based on current GFR, CKD stage is stage 4 - GFR 15-29.  Monitor UOP and serial BMP and adjust therapy as needed. Renally dose meds. Avoid nephrotoxic medications and procedures.

## 2024-11-13 NOTE — PROGRESS NOTES
"[facility] - Tele[facility] ([facility])  [facility] Medicine  Progress Note    Patient Name: Nadia Damon  MRN: [id number]  Patient Class: IP-Inpatient    Admission Date: 11/12/2024  Length of Stay: 0 days  Attending Physician: Ephraim Malin MD  Primary Care Provider: Elis Wick MD        Subjective:     Principal Problem:Adverse drug reaction        HPI:  69 year old female, comorbid conditions include HTN, CKD stage IV, CAD, breast CA, GUSTAVO, CHF, prior CVA and history of heart transplant in 1991. Patient sent to [facility] ED via EMS from cardiology clinic due to chest pain that onset after a dobutamine stress test. Patient reported feeling a "squeezing, heavy" discomfort during her stress echo that persisted even after dobutamine had been discontinued. Given chest pain symptoms, nitro was administered x 2. Shortly thereafter, patient became markedly wheezy and SOB and had seizure like-activity/tremors, prompting the EMS call. She received steroids, racemic epinephrine, and ativan upon arrival to ED with improvement in her symptoms. Cardiology was consulted to assist with management. Patient seen and examined today in ED. Remains very anxious/jittery. Still mildly SOB and endorses slight chest discomfort although much improved from earlier today. She reports compliance with her home medications. Followed by advanced CHF/transplant team (Dr. Tubbs). Chart reviewed. Initial troponin negative. BNP > 700. CT of head with NAF. TTE 7/24 with normal EF, small PFO. Stress images/echo not completed today given acuity of situation/patient's symptoms. Transplant team aware of patient's admission at this facility. Patient is a full code. Placed in observation under the care of Hospital Medicine for management of allergic reaction/adverse drug reaction.    Overview/Hospital Course:  69 year old female, under the care of Hospital Medicine for management of adverse drug rxn, chest pain. No further symptoms of allergic rxn " overnight. Intermittent episodes of seizure-like activity, intermittent chest pain. Patient experienced severe chest pain on 11/13. Repeat trop and EKG ordered. Discussed results with Cardiology, recommended to seek transfer to [facility] for higher level of care due to medical complexity. Consulted neurology for evaluation, recommended EEG >1H. Hold on initiating antiseizure medications, OK to treat with ativan for seizure activity. Consulted [facility], case reviewed. Patient accepted for transfer, pending bed assignment.     Interval History: Worsened chest pain, tremors/seizure like activity. Plan to transfer to [facility], pending bed assignment.     Review of Systems   Constitutional:  Positive for activity change and fatigue.   Respiratory:  Positive for chest tightness and shortness of breath.    Cardiovascular:  Positive for chest pain and palpitations.   Neurological:  Positive for weakness.   Psychiatric/Behavioral:  The patient is nervous/anxious.      Objective:     Vital Signs (Most Recent):  Temp: 98.3 °F (36.8 °C) (11/13/24 1228)  Pulse: 87 (11/13/24 1228)  Resp: 16 (11/13/24 1228)  BP: (!) 150/84 (11/13/24 1228)  SpO2: 100 % (11/13/24 1228) Vital Signs (24h Range):  Temp:  [98.3 °F (36.8 °C)-99 °F (37.2 °C)] 98.3 °F (36.8 °C)  Pulse:  [] 87  Resp:  [16-26] 16  SpO2:  [97 %-100 %] 100 %  BP: (110-186)/(58-92) 150/84     Weight: 67.4 kg (148 lb 9.4 oz)  Body mass index is 27.18 kg/m².    Intake/Output Summary (Last 24 hours) at 11/13/2024 1235  Last data filed at 11/13/2024 0710  Gross per 24 hour   Intake 240 ml   Output 800 ml   Net -560 ml         Physical Exam  Vitals and nursing note reviewed.   Constitutional:       General: She is in acute distress.      Appearance: She is overweight. She is ill-appearing.      Interventions: Nasal cannula in place.   HENT:      Head: Normocephalic and atraumatic.      Right Ear: Hearing and external ear normal.      Left Ear: Hearing and external ear normal.       Nose: No rhinorrhea.      Right Sinus: No maxillary sinus tenderness or frontal sinus tenderness.      Left Sinus: No maxillary sinus tenderness or frontal sinus tenderness.      Mouth/Throat:      Mouth: No oral lesions.      Pharynx: Uvula midline.   Eyes:      General:         Right eye: No discharge.         Left eye: No discharge.      Conjunctiva/sclera: Conjunctivae normal.      Pupils: Pupils are equal, round, and reactive to light.   Neck:      Thyroid: No thyromegaly.      Vascular: No carotid bruit.      Trachea: No tracheal deviation.   Cardiovascular:      Rate and Rhythm: Tachycardia present. Rhythm irregular.      Pulses:           Dorsalis pedis pulses are 2+ on the right side and 2+ on the left side.      Heart sounds: S1 normal and S2 normal. Murmur heard.   Pulmonary:      Effort: Pulmonary effort is normal. Tachypnea present.      Breath sounds: Normal breath sounds.   Abdominal:      General: Bowel sounds are normal. There is no distension.      Palpations: Abdomen is soft. There is no mass.      Tenderness: There is no abdominal tenderness.   Musculoskeletal:         General: Normal range of motion.      Cervical back: Normal range of motion.   Lymphadenopathy:      Cervical: No cervical adenopathy.      Upper Body:      Right upper body: No supraclavicular adenopathy.      Left upper body: No supraclavicular adenopathy.   Skin:     General: Skin is warm and dry.      Capillary Refill: Capillary refill takes less than 2 seconds.      Findings: No rash.   Neurological:      Mental Status: She is alert and oriented to person, place, and time.   Psychiatric:         Mood and Affect: Mood is anxious. Mood is not depressed.         Speech: Speech is rapid and pressured.         Behavior: Behavior is hyperactive.         Thought Content: Thought content normal.         Judgment: Judgment normal.             Significant Labs: All pertinent labs within the past 24 hours have been reviewed.  CBC:    Recent Labs   Lab 11/12/24  1216 11/13/24  0448   WBC 3.12* 3.19*   HGB 10.1* 9.9*   HCT 30.9* 31.1*    204     CMP:   Recent Labs   Lab 11/12/24  1216 11/13/24  0448    141   K 4.2 4.9    102   CO2 22* 23   GLU 92 124*   BUN 59* 59*   CREATININE 4.1* 4.7*   CALCIUM 9.3 9.4   PROT 8.3 7.8   ALBUMIN 3.8 3.5   BILITOT 0.5 0.4   ALKPHOS 140 107   AST 50* 40   ALT 32 30   ANIONGAP 14 16     Cardiac Markers:   Recent Labs   Lab 11/12/24  1216   *     Troponin:   Recent Labs   Lab 11/12/24  1808 11/12/24  2153 11/13/24  0824   TROPONINI 0.055* 0.059* 0.031*       Significant Imaging: I have reviewed all pertinent imaging results/findings within the past 24 hours.    Assessment/Plan:      * Adverse drug reaction  Patient undergoing nuclear stress test, developed chest pain/chest discomfort following dobutamine stress test.  Patient then given nitro and became short of breath with wheezing, seizure-like activity.    --new allergy nitroglycerin  --tele  --vitals q.4  --diphenhydramine 25 mg IV q.6 p.r.n. allergic reaction  --Improved      Chest pain  Patient underwent nuclear stress test on 11/12, result report states negative for ischemic changes, due to not being able to obtain post stress images, this result is not complete.     Felt secondary to adverse drug reaction from dobutamine and nitro.  Patient has history of heart transplant/denervated so will not experience typical angina.  Initial trop negative  Worsened on 11/13, repeat EKG, repeat troponin ordered.     --trend troponin- completed- flat  --continue home medications  --transplant team was contacted and is aware of admission  --Plan for transfer to [facility] for higher level of care      Chronic diastolic (congestive) heart failure  Patient has Diastolic (HFpEF) heart failure that is Chronic. On presentation their CHF was well compensated. Most recent BNP and echo results are listed below.  Recent Labs     11/12/24  1216   *        Latest ECHO  Results for orders placed during the hospital encounter of 07/19/24    Echo Saline Bubble? Yes    Interpretation Summary    Left Ventricle: The left ventricle is normal in size. Normal wall thickness. There is normal systolic function with a visually estimated ejection fraction of 55 - 60%. There is normal diastolic function.    Right Ventricle: Normal right ventricular cavity size. Wall thickness is normal. Systolic function is normal.    Left Atrium: Patent foramen ovale visualized with predominant right to left shunting indicated by saline contrast.    Tricuspid Valve: There is mild regurgitation.    IVC/SVC: Normal venous pressure at 3 mmHg.    The patient is status post cardiac transplantation.    Current Heart Failure Medications  carvediloL tablet 3.125 mg, 2 times daily with meals, Oral    Plan  - Monitor strict I&Os and daily weights.    - Place on telemetry  - Low sodium diet  - Place on fluid restriction of 1.5 L.   - Cardiology has been consulted  - The patient's volume status is at their baseline          Stage 4 chronic kidney disease  Creatine stable for now. BMP reviewed- noted Estimated Creatinine Clearance: 10 mL/min (A) (based on SCr of 4.7 mg/dL (H)). according to latest data. Based on current GFR, CKD stage is stage 4 - GFR 15-29.  Monitor UOP and serial BMP and adjust therapy as needed. Renally dose meds. Avoid nephrotoxic medications and procedures.    Essential hypertension  Patients blood pressure range in the last 24 hours was: BP  Min: 80/50  Max: 213/107.The patient's inpatient anti-hypertensive regimen is listed below:  Current Antihypertensives  carvediloL tablet 3.125 mg, 2 times daily with meals, Oral    Plan  - BP is controlled, no changes needed to their regimen      Heart transplanted  S/P transplant in 1991, transplant team contacted, aware of admission.  Patient managed with cyclosporine    --patient has home med available  --OK to take home med  --Cyclosporine  level      VTE Risk Mitigation (From admission, onward)           Ordered     IP VTE HIGH RISK PATIENT  Once         11/12/24 1527     Place sequential compression device  Until discontinued         11/12/24 1527                    Discharge Planning   RATNA:      Code Status: Full Code   Is the patient medically ready for discharge?:     Reason for patient still in hospital (select all that apply): Clinical worsening, plan for transfer                     Tanisha Alvarez NP  Department of Hospital Medicine   '[facility] ([facility])

## 2024-11-13 NOTE — PROGRESS NOTES
Pharmacist Renal Dose Adjustment Note    Nadia Damon is a 69 y.o. female being treated with the medication famotidine.     Patient Data:    Vital Signs (Most Recent):  Temp: 98.9 °F (37.2 °C) (11/13/24 0717)  Pulse: 94 (11/13/24 0807)  Resp: 16 (11/13/24 0820)  BP: (!) 160/88 (11/13/24 0717)  SpO2: 100 % (11/13/24 0717) Vital Signs (72h Range):  Temp:  [97.5 °F (36.4 °C)-99 °F (37.2 °C)]   Pulse:  []   Resp:  [16-26]   BP: (110-192)/(58-96)   SpO2:  [97 %-100 %]      Recent Labs   Lab 11/12/24  1216 11/13/24  0448   CREATININE 4.1* 4.7*     Serum creatinine: 4.7 mg/dL (H) 11/13/24 0448  Estimated creatinine clearance: 10 mL/min (A)    Famotidine 20 mg IV every 12 hours will be changed to famotidine 20 mg IV every 24 hours per renal dose protocol for CrCl < 50 ml/min.     Thank you,  Pharmacist's Name: David Bashir

## 2024-11-14 ENCOUNTER — DOCUMENTATION ONLY (OUTPATIENT)
Dept: NEUROLOGY | Facility: CLINIC | Age: 69
End: 2024-11-14
Payer: MEDICARE

## 2024-11-14 LAB
ALBUMIN SERPL BCP-MCNC: 3.3 G/DL (ref 3.5–5.2)
ALP SERPL-CCNC: 103 U/L (ref 40–150)
ALT SERPL W/O P-5'-P-CCNC: 25 U/L (ref 10–44)
ANION GAP SERPL CALC-SCNC: 10 MMOL/L (ref 8–16)
ASCENDING AORTA: 2.97 CM
AST SERPL-CCNC: 44 U/L (ref 10–40)
AV AREA BY CONTINUOUS VTI: 4.5 CM2
AV INDEX (PROSTH): 1.08
AV LVOT MEAN GRADIENT: 1 MMHG
AV LVOT PEAK GRADIENT: 3 MMHG
AV MEAN GRADIENT: 1.7 MMHG
AV PEAK GRADIENT: 3.2 MMHG
AV VALVE AREA BY VELOCITY RATIO: 3.7 CM²
AV VALVE AREA: 4.5 CM2
AV VELOCITY RATIO: 0.89
BASOPHILS # BLD AUTO: 0.02 K/UL (ref 0–0.2)
BASOPHILS NFR BLD: 0.5 % (ref 0–1.9)
BILIRUB SERPL-MCNC: 0.4 MG/DL (ref 0.1–1)
BSA FOR ECHO PROCEDURE: 1.68 M2
BUN SERPL-MCNC: 58 MG/DL (ref 8–23)
CALCIUM SERPL-MCNC: 8.6 MG/DL (ref 8.7–10.5)
CHLORIDE SERPL-SCNC: 105 MMOL/L (ref 95–110)
CO2 SERPL-SCNC: 26 MMOL/L (ref 23–29)
CREAT SERPL-MCNC: 4.1 MG/DL (ref 0.5–1.4)
CV ECHO LV RWT: 0.49 CM
CYCLOSPORINE BLD LC/MS/MS-MCNC: 72 NG/ML (ref 100–400)
CYCLOSPORINE BLD LC/MS/MS-MCNC: 89 NG/ML (ref 100–400)
DIFFERENTIAL METHOD BLD: ABNORMAL
DOP CALC AO PEAK VEL: 0.9 M/S
DOP CALC AO VTI: 15.5 CM
DOP CALC LVOT AREA: 4.2 CM2
DOP CALC LVOT DIAMETER: 2.3 CM
DOP CALC LVOT PEAK VEL: 0.8 M/S
DOP CALC LVOT STROKE VOLUME: 69.3 CM3
DOP CALCLVOT PEAK VEL VTI: 16.7 CM
E WAVE DECELERATION TIME: 167.43 MS
E/A RATIO: 1.16
E/E' RATIO: 7.25 M/S
ECHO EF ESTIMATED: 67 %
ECHO LV POSTERIOR WALL: 1 CM (ref 0.6–1.1)
EOSINOPHIL # BLD AUTO: 0.1 K/UL (ref 0–0.5)
EOSINOPHIL NFR BLD: 2.2 % (ref 0–8)
ERYTHROCYTE [DISTWIDTH] IN BLOOD BY AUTOMATED COUNT: 15.9 % (ref 11.5–14.5)
EST. GFR  (NO RACE VARIABLE): 11.1 ML/MIN/1.73 M^2
FRACTIONAL SHORTENING: 36.6 % (ref 28–44)
GLUCOSE SERPL-MCNC: 99 MG/DL (ref 70–110)
HCT VFR BLD AUTO: 31.7 % (ref 37–48.5)
HGB BLD-MCNC: 9.9 G/DL (ref 12–16)
IMM GRANULOCYTES # BLD AUTO: 0 K/UL (ref 0–0.04)
IMM GRANULOCYTES NFR BLD AUTO: 0 % (ref 0–0.5)
INTERVENTRICULAR SEPTUM: 1 CM (ref 0.6–1.1)
IVC DIAMETER: 0.71 CM
LEFT ATRIUM SIZE: 4.44 CM
LEFT INTERNAL DIMENSION IN SYSTOLE: 2.6 CM (ref 2.1–4)
LEFT VENTRICLE DIASTOLIC VOLUME INDEX: 44.9 ML/M2
LEFT VENTRICLE DIASTOLIC VOLUME: 74.54 ML
LEFT VENTRICLE MASS INDEX: 79.6 G/M2
LEFT VENTRICLE SYSTOLIC VOLUME INDEX: 14.7 ML/M2
LEFT VENTRICLE SYSTOLIC VOLUME: 24.39 ML
LEFT VENTRICULAR INTERNAL DIMENSION IN DIASTOLE: 4.1 CM (ref 3.5–6)
LEFT VENTRICULAR MASS: 132.1 G
LV LATERAL E/E' RATIO: 4.83
LV SEPTAL E/E' RATIO: 14.5
LYMPHOCYTES # BLD AUTO: 0.7 K/UL (ref 1–4.8)
LYMPHOCYTES NFR BLD: 19.4 % (ref 18–48)
MAGNESIUM SERPL-MCNC: 2.2 MG/DL (ref 1.6–2.6)
MCH RBC QN AUTO: 28.4 PG (ref 27–31)
MCHC RBC AUTO-ENTMCNC: 31.2 G/DL (ref 32–36)
MCV RBC AUTO: 91 FL (ref 82–98)
MONOCYTES # BLD AUTO: 0.5 K/UL (ref 0.3–1)
MONOCYTES NFR BLD: 14.2 % (ref 4–15)
MV PEAK A VEL: 0.5 M/S
MV PEAK E VEL: 0.58 M/S
NEUTROPHILS # BLD AUTO: 2.4 K/UL (ref 1.8–7.7)
NEUTROPHILS NFR BLD: 63.7 % (ref 38–73)
NRBC BLD-RTO: 0 /100 WBC
OHS CV RV/LV RATIO: 0.9 CM
OHS QRS DURATION: 158 MS
OHS QTC CALCULATION: 566 MS
PHOSPHATE SERPL-MCNC: 3.8 MG/DL (ref 2.7–4.5)
PISA TR MAX VEL: 3.2 M/S
PLATELET # BLD AUTO: 190 K/UL (ref 150–450)
PMV BLD AUTO: 10 FL (ref 9.2–12.9)
POTASSIUM SERPL-SCNC: 4.2 MMOL/L (ref 3.5–5.1)
PROT SERPL-MCNC: 7.4 G/DL (ref 6–8.4)
RA PRESSURE ESTIMATED: 3 MMHG
RBC # BLD AUTO: 3.48 M/UL (ref 4–5.4)
RIGHT VENTRICLE DIASTOLIC BASEL DIMENSION: 3.7 CM
RV TB RVSP: 6 MMHG
RV TISSUE DOPPLER FREE WALL SYSTOLIC VELOCITY 1 (APICAL 4 CHAMBER VIEW): 4.35 CM/S
SINUS: 3.07 CM
SODIUM SERPL-SCNC: 141 MMOL/L (ref 136–145)
STJ: 2.85 CM
TDI LATERAL: 0.12 M/S
TDI SEPTAL: 0.04 M/S
TDI: 0.08 M/S
TR MAX PG: 41 MMHG
TRICUSPID ANNULAR PLANE SYSTOLIC EXCURSION: 0.76 CM
TV PEAK GRADIENT: 46 MMHG
TV REST PULMONARY ARTERY PRESSURE: 44 MMHG
WBC # BLD AUTO: 3.72 K/UL (ref 3.9–12.7)
Z-SCORE OF LEFT VENTRICULAR DIMENSION IN END DIASTOLE: -1.26
Z-SCORE OF LEFT VENTRICULAR DIMENSION IN END SYSTOLE: -0.81

## 2024-11-14 PROCEDURE — 85025 COMPLETE CBC W/AUTO DIFF WBC: CPT | Mod: HCNC | Performed by: HOSPITALIST

## 2024-11-14 PROCEDURE — G0378 HOSPITAL OBSERVATION PER HR: HCPCS | Mod: HCNC

## 2024-11-14 PROCEDURE — 95813 EEG EXTND MNTR 61-119 MIN: CPT | Mod: HCNC

## 2024-11-14 PROCEDURE — 95813 EEG EXTND MNTR 61-119 MIN: CPT | Mod: 26,HCNC,, | Performed by: PSYCHIATRY & NEUROLOGY

## 2024-11-14 PROCEDURE — 25000003 PHARM REV CODE 250: Mod: HCNC | Performed by: HOSPITALIST

## 2024-11-14 PROCEDURE — 93005 ELECTROCARDIOGRAM TRACING: CPT | Mod: HCNC

## 2024-11-14 PROCEDURE — 96376 TX/PRO/DX INJ SAME DRUG ADON: CPT

## 2024-11-14 PROCEDURE — 63600175 PHARM REV CODE 636 W HCPCS: Mod: HCNC | Performed by: HOSPITALIST

## 2024-11-14 PROCEDURE — 96372 THER/PROPH/DIAG INJ SC/IM: CPT | Performed by: HOSPITALIST

## 2024-11-14 PROCEDURE — 84100 ASSAY OF PHOSPHORUS: CPT | Mod: HCNC | Performed by: HOSPITALIST

## 2024-11-14 PROCEDURE — 25000003 PHARM REV CODE 250: Mod: HCNC | Performed by: FAMILY MEDICINE

## 2024-11-14 PROCEDURE — 80053 COMPREHEN METABOLIC PANEL: CPT | Mod: HCNC | Performed by: HOSPITALIST

## 2024-11-14 PROCEDURE — 80158 DRUG ASSAY CYCLOSPORINE: CPT | Mod: HCNC | Performed by: HOSPITALIST

## 2024-11-14 PROCEDURE — 99214 OFFICE O/P EST MOD 30 MIN: CPT | Mod: HCNC,,, | Performed by: INTERNAL MEDICINE

## 2024-11-14 PROCEDURE — 96375 TX/PRO/DX INJ NEW DRUG ADDON: CPT

## 2024-11-14 PROCEDURE — 36415 COLL VENOUS BLD VENIPUNCTURE: CPT | Mod: HCNC | Performed by: HOSPITALIST

## 2024-11-14 PROCEDURE — 83735 ASSAY OF MAGNESIUM: CPT | Mod: HCNC | Performed by: HOSPITALIST

## 2024-11-14 PROCEDURE — 93010 ELECTROCARDIOGRAM REPORT: CPT | Mod: HCNC,,, | Performed by: INTERNAL MEDICINE

## 2024-11-14 RX ORDER — DIPHENHYDRAMINE HYDROCHLORIDE 50 MG/ML
12.5 INJECTION INTRAMUSCULAR; INTRAVENOUS EVERY 6 HOURS PRN
Status: DISCONTINUED | OUTPATIENT
Start: 2024-11-14 | End: 2024-11-14

## 2024-11-14 RX ORDER — HYDROXYZINE PAMOATE 25 MG/1
50 CAPSULE ORAL EVERY 8 HOURS PRN
Status: DISCONTINUED | OUTPATIENT
Start: 2024-11-15 | End: 2024-11-15 | Stop reason: HOSPADM

## 2024-11-14 RX ORDER — LORAZEPAM 2 MG/ML
1 INJECTION INTRAMUSCULAR
Status: DISCONTINUED | OUTPATIENT
Start: 2024-11-14 | End: 2024-11-15 | Stop reason: HOSPADM

## 2024-11-14 RX ORDER — DIPHENHYDRAMINE HYDROCHLORIDE 50 MG/ML
25 INJECTION INTRAMUSCULAR; INTRAVENOUS EVERY 6 HOURS PRN
Status: DISCONTINUED | OUTPATIENT
Start: 2024-11-14 | End: 2024-11-15 | Stop reason: HOSPADM

## 2024-11-14 RX ORDER — HYDROMORPHONE HYDROCHLORIDE 1 MG/ML
0.5 INJECTION, SOLUTION INTRAMUSCULAR; INTRAVENOUS; SUBCUTANEOUS
Status: DISCONTINUED | OUTPATIENT
Start: 2024-11-14 | End: 2024-11-15 | Stop reason: HOSPADM

## 2024-11-14 RX ADMIN — HEPARIN SODIUM 5000 UNITS: 5000 INJECTION INTRAVENOUS; SUBCUTANEOUS at 09:11

## 2024-11-14 RX ADMIN — ZOLPIDEM TARTRATE 5 MG: 5 TABLET, COATED ORAL at 07:11

## 2024-11-14 RX ADMIN — SODIUM BICARBONATE 650 MG TABLET 650 MG: at 08:11

## 2024-11-14 RX ADMIN — HEPARIN SODIUM 5000 UNITS: 5000 INJECTION INTRAVENOUS; SUBCUTANEOUS at 05:11

## 2024-11-14 RX ADMIN — Medication 400 UNITS: at 08:11

## 2024-11-14 RX ADMIN — POLYETHYLENE GLYCOL 3350 17 G: 17 POWDER, FOR SOLUTION ORAL at 08:11

## 2024-11-14 RX ADMIN — HYDROMORPHONE HYDROCHLORIDE 0.5 MG: 1 INJECTION, SOLUTION INTRAMUSCULAR; INTRAVENOUS; SUBCUTANEOUS at 08:11

## 2024-11-14 RX ADMIN — CYCLOSPORINE 75 MG: 25 CAPSULE, LIQUID FILLED ORAL at 05:11

## 2024-11-14 RX ADMIN — DIPHENHYDRAMINE HYDROCHLORIDE 12.5 MG: 50 INJECTION, SOLUTION INTRAMUSCULAR; INTRAVENOUS at 11:11

## 2024-11-14 RX ADMIN — DIPHENHYDRAMINE HYDROCHLORIDE 12.5 MG: 50 INJECTION, SOLUTION INTRAMUSCULAR; INTRAVENOUS at 08:11

## 2024-11-14 RX ADMIN — ASPIRIN 81 MG: 81 TABLET, COATED ORAL at 08:11

## 2024-11-14 RX ADMIN — CARVEDILOL 3.12 MG: 3.12 TABLET, FILM COATED ORAL at 08:11

## 2024-11-14 RX ADMIN — PREGABALIN 25 MG: 25 CAPSULE ORAL at 07:11

## 2024-11-14 RX ADMIN — HEPARIN SODIUM 5000 UNITS: 5000 INJECTION INTRAVENOUS; SUBCUTANEOUS at 02:11

## 2024-11-14 RX ADMIN — DIPHENHYDRAMINE HYDROCHLORIDE 25 MG: 50 INJECTION, SOLUTION INTRAMUSCULAR; INTRAVENOUS at 12:11

## 2024-11-14 RX ADMIN — FAMOTIDINE 20 MG: 20 TABLET ORAL at 06:11

## 2024-11-14 RX ADMIN — DIPHENHYDRAMINE HYDROCHLORIDE, ZINC ACETATE: 2; .1 CREAM TOPICAL at 10:11

## 2024-11-14 RX ADMIN — SODIUM BICARBONATE 650 MG TABLET 650 MG: at 07:11

## 2024-11-14 RX ADMIN — HYDROMORPHONE HYDROCHLORIDE 0.5 MG: 1 INJECTION, SOLUTION INTRAMUSCULAR; INTRAVENOUS; SUBCUTANEOUS at 02:11

## 2024-11-14 RX ADMIN — HYDROXYZINE PAMOATE 50 MG: 25 CAPSULE ORAL at 11:11

## 2024-11-14 RX ADMIN — CALCITRIOL CAPSULES 0.25 MCG 0.25 MCG: 0.25 CAPSULE ORAL at 08:11

## 2024-11-14 RX ADMIN — DIPHENHYDRAMINE HYDROCHLORIDE 12.5 MG: 50 INJECTION, SOLUTION INTRAMUSCULAR; INTRAVENOUS at 06:11

## 2024-11-14 RX ADMIN — CARVEDILOL 3.12 MG: 3.12 TABLET, FILM COATED ORAL at 05:11

## 2024-11-14 RX ADMIN — CYCLOSPORINE 75 MG: 25 CAPSULE, LIQUID FILLED ORAL at 08:11

## 2024-11-14 NOTE — PLAN OF CARE
Transfer to [place] for higher level of care  Problem: Adult Inpatient Plan of Care  Goal: Plan of Care Review  Outcome: Progressing  Goal: Patient-Specific Goal (Individualized)  Outcome: Progressing  Goal: Absence of Hospital-Acquired Illness or Injury  Outcome: Progressing  Goal: Optimal Comfort and Wellbeing  Outcome: Progressing  Goal: Readiness for Transition of Care  Outcome: Progressing     Problem: Acute Kidney Injury/Impairment  Goal: Fluid and Electrolyte Balance  Outcome: Progressing  Goal: Improved Oral Intake  Outcome: Progressing  Goal: Effective Renal Function  Outcome: Progressing

## 2024-11-14 NOTE — ASSESSMENT & PLAN NOTE
Pt on pantoprazole as outpatient - on famotidine at time of transfer.     -no recent EGD in chart, she has CKD and hx of C.diff - PPI is not a good medication for her.  Continue famotidine and would discuss with patient discontinued pantoprazole at discharge.

## 2024-11-14 NOTE — DISCHARGE SUMMARY
"[facility] - Telemetry ([facility])  [facility] Medicine  Discharge Summary      Patient Name: Nadia Damon  MRN: [id number]  CHRISTIANO: [id number]  Patient Class: IP- Inpatient  Admission Date: 11/12/2024  Hospital Length of Stay: 0 days  Discharge Date and Time:  11/13/2024 7:18 PM  Attending Physician: Ephraim Malin MD   Discharging Provider: Tanisha Alvarez NP  Primary Care Provider: Elis Wick MD    Primary Care Team: [facility] MEDICINE B    HPI:   69 year old female, comorbid conditions include HTN, CKD stage IV, CAD, breast CA, GUSTAVO, CHF, prior CVA and history of heart transplant in 1991. Patient sent to [facility] ED via EMS from cardiology clinic due to chest pain that onset after a dobutamine stress test. Patient reported feeling a "squeezing, heavy" discomfort during her stress echo that persisted even after dobutamine had been discontinued. Given chest pain symptoms, nitro was administered x 2. Shortly thereafter, patient became markedly wheezy and SOB and had seizure like-activity/tremors, prompting the EMS call. She received steroids, racemic epinephrine, and ativan upon arrival to ED with improvement in her symptoms. Cardiology was consulted to assist with management. Patient seen and examined today in ED. Remains very anxious/jittery. Still mildly SOB and endorses slight chest discomfort although much improved from earlier today. She reports compliance with her home medications. Followed by advanced CHF/transplant team (Dr. Tubbs). Chart reviewed. Initial troponin negative. BNP > 700. CT of head with NAF. TTE 7/24 with normal EF, small PFO. Stress images/echo not completed today given acuity of situation/patient's symptoms. Transplant team aware of patient's admission at this facility. Patient is a full code. Placed in observation under the care of Hospital Medicine for management of allergic reaction/adverse drug reaction.    * No surgery found *      Hospital Course:   69 year old female, under " the care of Hospital Medicine for management of adverse drug rxn, chest pain. No further symptoms of allergic rxn overnight. Intermittent episodes of seizure-like activity, intermittent chest pain. Patient experienced severe chest pain on 11/13. Repeat trop and EKG ordered. Discussed results with Cardiology, recommended to seek transfer to [facility] for higher level of care due to medical complexity. Consulted neurology for evaluation, recommended EEG >1H. Hold on initiating antiseizure medications, OK to treat with ativan for seizure activity. Consulted [facility], case reviewed. Patient accepted for transfer, bed assignment completed, discharge orders placed.       Goals of Care Treatment Preferences:  Code Status: Full Code    Living Will: Yes     What is most important right now is to focus on remaining as independent as possible, curative/life-prolongation (regardless of treatment burdens).  Accordingly, we have decided that the best plan to meet the patient's goals includes continuing with treatment.         Consults:   Consults (From admission, onward)          Status Ordering Provider     Inpatient consult to Telemedicine-General Neurology  Once        Provider:  Quincy Kenyon MD    Completed ELIANA, APRIL ROSY.     Inpatient consult to Cardiology  Once        Provider:  Erik Calloway MD    Completed JULES ROMEO            No new Assessment & Plan notes have been filed under this hospital service since the last note was generated.  Service: Hospital Medicine    Final Active Diagnoses:    Diagnosis Date Noted POA    PRINCIPAL PROBLEM:  Adverse drug reaction [T50.905A] 11/12/2024 Yes    Unresponsiveness [R41.89] 11/13/2024 No    SOB (shortness of breath) [R06.02] 11/12/2024 Yes    Chest pain [R07.9] 04/05/2022 Yes    Chronic diastolic (congestive) heart failure [I50.32] 12/16/2021 Yes     Chronic    Stage 4 chronic kidney disease [N18.4] 11/22/2021 Yes    Essential hypertension [I10] 04/13/2017 Yes      Chronic    Heart transplanted [Z94.1] 05/15/2013 Not Applicable     Chronic      Problems Resolved During this Admission:       Discharged Condition:  Stable for transfer    Disposition: Planned Readmission - Di*    Follow Up:    Patient Instructions:   No discharge procedures on file.    Significant Diagnostic Studies: Labs: CMP   Recent Labs   Lab 11/12/24  1216 11/13/24  0448    141   K 4.2 4.9    102   CO2 22* 23   GLU 92 124*   BUN 59* 59*   CREATININE 4.1* 4.7*   CALCIUM 9.3 9.4   PROT 8.3 7.8   ALBUMIN 3.8 3.5   BILITOT 0.5 0.4   ALKPHOS 140 107   AST 50* 40   ALT 32 30   ANIONGAP 14 16   , CBC   Recent Labs   Lab 11/12/24  1216 11/13/24  0448   WBC 3.12* 3.19*   HGB 10.1* 9.9*   HCT 30.9* 31.1*    204   , and Troponin   Recent Labs   Lab 11/12/24  1808 11/12/24  2153 11/13/24  0824   TROPONINI 0.055* 0.059* 0.031*       Pending Diagnostic Studies:       Procedure Component Value Units Date/Time    COVID-19 Rapid Screening [[id number]] Collected: 11/13/24 1854    Order Status: Sent Lab Status: In process Updated: 11/13/24 1855    Specimen: Nasal Swab     Cyclosporine level [[id number]] Collected: 11/13/24 0811    Order Status: Sent Lab Status: In process Updated: 11/13/24 1559    Specimen: Blood            Medications:  Transfer Medications (for Discharge Readmit only):   Current Facility-Administered Medications   Medication Dose Route Frequency Provider Last Rate Last Admin    acetaminophen tablet 650 mg  650 mg Oral Q4H PRN Tanisha Alvarez NP        ALPRAZolam tablet 0.25 mg  0.25 mg Oral TID PRN Ephraim Malin MD   0.25 mg at 11/13/24 0302    aluminum-magnesium hydroxide-simethicone 200-200-20 mg/5 mL suspension 30 mL  30 mL Oral QID PRN Tanisha Alvarez NP   30 mL at 11/13/24 0816    aluminum-magnesium hydroxide-simethicone 200-200-20 mg/5 mL suspension 30 mL  30 mL Oral Once Antonio, April J., NP        aspirin EC tablet 81 mg  81 mg Oral Daily Tanisha Alvarez., NP   81 mg  at 11/13/24 0817    calcitRIOL capsule 0.25 mcg  0.25 mcg Oral Daily Antonio April YURIDIA, NP   0.25 mcg at 11/13/24 0817    calcium carbonate 200 mg calcium (500 mg) chewable tablet 1,000 mg  1,000 mg Oral TID PRN Tanisha Alvarez, NP        carvediloL tablet 3.125 mg  3.125 mg Oral BID WM Antonio April YURIDIA, NP   3.125 mg at 11/13/24 1831    cycloSPORINE modified (NEORAL) (NEORAL) capsule 75 mg  75 mg Oral BID Antonio April YURIDIA, NP   75 mg at 11/13/24 1831    dextrose 10% bolus 125 mL 125 mL  12.5 g Intravenous PRN Antonio April YURIDIA, NP        dextrose 10% bolus 250 mL 250 mL  25 g Intravenous PRN Antonio April YURIDIA, NP        diphenhydrAMINE injection 25 mg  25 mg Intravenous Q6H PRN Antonio April YURIDIA, NP        famotidine IVPB 20 mg  20 mg Intravenous Daily Antonio April YURIDIA, NP   Stopped at 11/13/24 1023    glucagon (human recombinant) injection 1 mg  1 mg Intramuscular PRN Antonio April YURIDIA, NP        glucose chewable tablet 16 g  16 g Oral PRN Antonio April YURIDIA, NP        glucose chewable tablet 24 g  24 g Oral PRN Antonio April YURIDIA, NP        HYDROmorphone (PF) injection 1 mg  1 mg Intravenous Q3H PRN Antonio April YURIDIA, NP        LORazepam injection 2 mg  2 mg Intravenous Q2H PRN Antonio April YURIDIA, NP   2 mg at 11/13/24 1130    naloxone 0.4 mg/mL injection 0.4 mg  0.4 mg Intravenous PRN Ephraim Malin MD   0.4 mg at 11/13/24 1210    ondansetron injection 4 mg  4 mg Intravenous Q8H PRN Antonio April YURIDIA, NP   4 mg at 11/12/24 1552    polyethylene glycol packet 17 g  17 g Oral BID PRN Antonio April YURIDIA, NP   17 g at 11/13/24 0816    pregabalin capsule 25 mg  25 mg Oral QHS Tanisha Alvarez, NP   25 mg at 11/12/24 2101    prochlorperazine injection Soln 5 mg  5 mg Intravenous Q6H PRN Tanisha Alvarez, NP        racepinephrine 2.25 % nebulizer solution 0.5 mL  0.5 mL Nebulization Q4H PRN Leana Deshpande MD   0.5 mL at 11/12/24 1156    sodium bicarbonate tablet 650 mg  650 mg Oral BID Tanisha Alvarez, NP    650 mg at 11/13/24 0817    sodium chloride 0.9% flush 10 mL  10 mL Intravenous Q12H PRN Tanisha Alvarez NP        zolpidem tablet 5 mg  5 mg Oral QHS Tanisha Alvarez NP   5 mg at 11/12/24 2101       Indwelling Lines/Drains at time of discharge:   Lines/Drains/Airways       None                   Time spent on the discharge of patient: 45 minutes         Tanisha Alvarez NP  Department of Hospital Medicine  [facility] - Tele[facility] ([facility])

## 2024-11-14 NOTE — ASSESSMENT & PLAN NOTE
Patient has Diastolic (HFpEF) heart failure that is Chronic. On presentation their CHF was well compensated. Most recent BNP and echo results are listed below.  Recent Labs     11/12/24  1216   *         Current Heart Failure Medications  carvediloL tablet 3.125 mg, 2 times daily with meals, Oral    Plan  - Monitor strict I&Os and daily weights.    - Place on telemetry  - Place on fluid restriction of 1.5 L.   - Cardiology has been consulted  - The patient's volume status is stable - BNP elevated but also renal function uptrending.   Appears euvolemic - F/u ECHO.  She was given IV lasix doses prior to transfer.   Evaluate in AM and f/u ECHo  - resume home diuretic lasix 40mg daily a appropriate

## 2024-11-14 NOTE — ASSESSMENT & PLAN NOTE
1 occurrence on chart review - CT head negative   -Obtain extended EEG as per transfer center plan  -neurology consult.     Patient reports she had acutely worsening of her back pain that evening and when she will get muscle spasms - she demonstrated for us briefly uncontrolled spasm/convulsions that occur.   I also wonder if it may have been anxiety attack/somatization symptoms given the physical stress she had experienced during the daytime of 11/12.    Patient indicates she remembers this event which would seem like seizure less likely.  However fulfill planned workup,  patient has had recent stroke, received benadryl IV earlier in the day - so definite seizure risk factors.

## 2024-11-14 NOTE — PLAN OF CARE
AAOx4, afebrile, c/o chest pain. IV dilaudid  and maalox given. Pt was itching after pain medication-IV benadyl given. Pt had some relief with PRN pain and itching medication. Tele monitor in place. Cardiology, HTS, Neurology consulted for the am. ECHO and EEG ordered. Neuro checks q4h. Seizure precautions (suction set up and side rails padded) in place. Pt is on 2L NC-pt got SOB on exertion. Continuous pulse ox in place. SCDs on. SQ heparin shots will start this am. Purwick in place with yellow urine. Pt has not had a bm since 11/11 per pt, scheduled miralax continued. Pt is 1-2 person assist OOB with cane. Bed alarm on. Pt in lowest position, side rails up x2, non-skid foot wear in place, call light within reach, pt verbalized understanding to call RN when needed. Hand hygiene practiced per protocol. Will continue to monitor.

## 2024-11-14 NOTE — SUBJECTIVE & OBJECTIVE
Past Medical History:   Diagnosis Date    Abdominal wall hernia     CT Renal 6/11/2018---Small fat containing superior ventral abdominal wall hernia at the epicardial pacing lead site.    Abnormal mammogram 10/12/2021    Acute cystitis without hematuria 05/10/2022    Acute ischemic right middle cerebral artery (MCA) stroke 07/20/2024    GRACE (acute kidney injury) 11/22/2021    Anxiety     Aphasia 07/19/2024    Arthritis     ZEN HIPS    Breast cancer in female 08/2021    LEFT BREAST    Breast mass, right 11/13/2024    RIGHT BREAST MASS (Problem)      Bronchitis 08/18/2016    Never smoked      C. difficile colitis 11/29/2021    Cellulitis of axilla, left 12/23/2021    Chronic diastolic heart failure 12/16/2021    Chronic kidney disease     stage 4, GFR 15-29 ml/min    Chronic midline low back pain without sciatica 06/18/2018    Closed nondisplaced fracture of distal phalanx of left great toe with routine healing 10/22/2018    Coronary artery disease 1993    heart transplant    COVID-19 in immunocompromised patient 02/26/2024    Cystitis 05/10/2022    Depression     Encounter for blood transfusion     Encounter for palliative care 10/14/2024    Fibromyalgia     on Lyrica    Heart failure     native heart cardiomyopathy    Heart transplanted 1993    due to cardiomyopathy    History of hyperparathyroidism; Hyperparathyroidism, secondary renal     PT DENIES    Hypertension     Immune disorder     anti rejection meds    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    Impaired mobility 07/28/2022    Iron deficiency anemia 08/15/2017    Kidney stones     passed per pt    Obesity     Obesity (BMI 30.0-34.9) 07/22/2019    Other osteoporosis without current pathological fracture 08/30/2019    Other pancytopenia 05/06/2024    Parotitis, acute 09/19/2023    Pharyngitis 01/09/2019    Pneumonia due to infectious organism 03/14/2024    PONV (postoperative nausea and vomiting)     Severe sepsis 11/22/2021    Shingles  2003 approx    left leg    Subclinical hypothyroidism 06/16/2023    Thrombocytopenia, unspecified 11/29/2021    Trouble in sleeping     Urinary incontinence        Past Surgical History:   Procedure Laterality Date    bladder implant Right 06/11/2024    BLADDER SURGERY  2015 approx    mesh - Dr Everett then 2nd reconstructive sx Dr Onofre    BREAST BIOPSY Bilateral     NEGATIVE    BREAST BIOPSY Right 10/31/2022    benign    BREAST LUMPECTOMY Left 2021    BREAST SURGERY Left 09/28/2015    Bx - benign    BREAST SURGERY Right 12/2015    Bx benign    CARDIAC PACEMAKER REMOVAL Left 06/26/2014    Pacer defirillator removed. Put in 1993 aat time of heart transplant    CARPAL TUNNEL RELEASE Left 03/03/2015    Dr. Hall    COLONOSCOPY N/A 02/25/2021    Procedure: COLONOSCOPY;  Surgeon: Freida Ramirez MD;  Location: [facility];  Service: Endoscopy;  Laterality: N/A;    CYSTOCELE REPAIR      Twice with mesh removal    EPIDURAL STEROID INJECTION INTO CERVICAL SPINE N/A 02/02/2023    Procedure: T11/T12 IL HELLEN;  Surgeon: Jassi Pierre MD;  Location: [facility] PAIN MGT;  Service: Pain Management;  Laterality: N/A;    EPIDURAL STEROID INJECTION INTO CERVICAL SPINE N/A 9/16/2024    Procedure: T11/T12 IL HELLEN;  Surgeon: Jassi Pierre MD;  Location: [facility] PAIN [facility]GT;  Service: Pain Management;  Laterality: N/A;    HEART TRANSPLANT  1993    HERNIA REPAIR Right 1971 approx    Inguinal    HYSTERECTOMY  1983    vag hyst /LSO     IMPLANTATION OF PERMANENT SACRAL NERVE STIMULATOR N/A 06/11/2024    Procedure: INSERTION, NEUROSTIMULATOR, PERMANENT, SACRAL;  Surgeon: Sami oCchran MD;  Location: [facility] OR;  Service: Urology;  Laterality: N/A;    INCISION AND DRAINAGE OF ABSCESS Left 12/24/2021    Procedure: INCISION AND DRAINAGE, ABSCESS;  Surgeon: Joseph Longo MD;  Location: [facility] OR;  Service: General;  Laterality: Left;    INJECTION OF ANESTHETIC AGENT AROUND MEDIAL BRANCH NERVES INNERVATING LUMBAR FACET JOINT Right 10/19/2022     Procedure: Right L4/L5 and L5/S1 MBB;  Surgeon: Jassi Pierre MD;  Location: [facility] PAIN [facility]GT;  Service: Pain Management;  Laterality: Right;    INJECTION OF ANESTHETIC AGENT AROUND MEDIAL BRANCH NERVES INNERVATING LUMBAR FACET JOINT Right 11/09/2022    Procedure: Right L4/L5 and L5/S1 MBB;  Surgeon: Jassi Pierre MD;  [facility];  Service: Pain Management;  Laterality: Right;    INJECTION OF ANESTHETIC AGENT INTO SACROILIAC JOINT Right 08/22/2022    Procedure: Right SIJ Injection Right L5/S1 Facte Injection;  Surgeon: Jassi Pierre MD;  [facility];  Service: Pain Management;  Laterality: Right;    INSERTION OF TUNNELED CENTRAL VENOUS CATHETER (CVC) WITH SUBCUTANEOUS PORT N/A 11/09/2021    Procedure: [id number];  Surgeon: Christoph Douglas MD;  Location: [facility] OR;  Service: General;  Laterality: N/A;    RADIOFREQUENCY THERMOCOAGULATION Right 12/07/2022    Procedure: Right L4/L5 and L5/S1 Lumbar RFA;  Surgeon: Jassi Pierre MD;  Location: [facility] PAIN [facility]GT;  Service: Pain Management;  Laterality: Right;    REMOVAL OF VASCULAR ACCESS PORT      ROBOT-ASSISTED LAPAROSCOPIC ABDOMINAL SACROCOLPOPEXY N/A 08/10/2023    Procedure: ROBOTIC SACROCOLPOPEXY, ABDOMEN;  Surgeon: PRANAY Villalobos MD;  Location: [facility] OR;  Service: OB/GYN;  Laterality: N/A;    ROBOT-ASSISTED LAPAROSCOPIC OOPHORECTOMY Right 08/10/2023    Procedure: ROBOTIC OOPHORECTOMY;  Surgeon: PRANAY Villalobos MD;  Location: [facility] OR;  Service: OB/GYN;  Laterality: Right;    SENTINEL LYMPH NODE BIOPSY Left 10/12/2021    Procedure: BIOPSY, LYMPH NODE, SENTINEL;  Surgeon: Christoph Douglas MD;  Location: [facility] OR;  Service: General;  Laterality: Left;    TOE SURGERY      XI ROBOTIC URETHROPEXY N/A 08/10/2023    Procedure: XI ROBOTIC URETHROPEXY;  Surgeon: PRANAY Villalobos MD;  Location: [facility] OR;  Service: OB/GYN;  Laterality: N/A;       Review of patient's allergies indicates:   Allergen Reactions    Dobutamine Other  (See Comments)     Severe Left sided chest pain after dosage administered for Dobutamine Stress Test    Lisinopril Swelling and Rash    Nitro Shortness Of Breath     Audible wheezing - after Nitrolgycerin Spray administered x 2    Hydrocodone-acetaminophen Nausea Only    Augmentin [amoxicillin-pot clavulanate] Diarrhea    Zyvox [linezolid] Nausea And Vomiting       Current Facility-Administered Medications on File Prior to Encounter   Medication    [DISCONTINUED] acetaminophen tablet 650 mg    [DISCONTINUED] ALPRAZolam tablet 0.25 mg    [DISCONTINUED] aluminum-magnesium hydroxide-simethicone 200-200-20 mg/5 mL suspension 30 mL    [DISCONTINUED] aluminum-magnesium hydroxide-simethicone 200-200-20 mg/5 mL suspension 30 mL    [DISCONTINUED] aspirin EC tablet 81 mg    [DISCONTINUED] calcitRIOL capsule 0.25 mcg    [DISCONTINUED] calcium carbonate 200 mg calcium (500 mg) chewable tablet 1,000 mg    [DISCONTINUED] carvediloL tablet 3.125 mg    [DISCONTINUED] cycloSPORINE modified (NEORAL) (NEORAL) capsule 75 mg    [DISCONTINUED] dextrose 10% bolus 125 mL 125 mL    [DISCONTINUED] dextrose 10% bolus 250 mL 250 mL    [DISCONTINUED] diphenhydrAMINE injection 25 mg    [DISCONTINUED] famotidine IVPB 20 mg    [DISCONTINUED] glucagon (human recombinant) injection 1 mg    [DISCONTINUED] glucose chewable tablet 16 g    [DISCONTINUED] glucose chewable tablet 24 g    [DISCONTINUED] HYDROmorphone (PF) injection 1 mg    [DISCONTINUED] HYDROmorphone (PF) injection 2 mg    [DISCONTINUED] LORazepam injection 2 mg    [DISCONTINUED] naloxone 0.4 mg/mL injection 0.02 mg    [DISCONTINUED] naloxone 0.4 mg/mL injection 0.4 mg    [DISCONTINUED] ondansetron injection 4 mg    [DISCONTINUED] polyethylene glycol packet 17 g    [DISCONTINUED] pregabalin capsule 25 mg    [DISCONTINUED] prochlorperazine injection Soln 5 mg    [DISCONTINUED] racepinephrine 2.25 % nebulizer solution 0.5 mL    [DISCONTINUED] sodium bicarbonate tablet 650 mg     [DISCONTINUED] sodium chloride 0.9% flush 10 mL    [DISCONTINUED] zolpidem tablet 5 mg     Current Outpatient Medications on File Prior to Encounter   Medication Sig    aspirin (ECOTRIN) 81 MG EC tablet Take 1 tablet (81 mg total) by mouth once daily.    calcitRIOL (ROCALTROL) 0.25 MCG Cap Take 1 capsule (0.25 mcg total) by mouth once daily.    carvediloL (COREG) 3.125 MG tablet Take 1 tablet (3.125 mg total) by mouth 2 (two) times daily with meals. Hold parameters: SBP less than 110 and/or DBP less than 75    cycloSPORINE modified, NEORAL, (NEORAL) 25 MG capsule Take 3 capsules (75 mg total) by mouth 2 (two) times daily.    pregabalin (LYRICA) 25 MG capsule Take 1 capsule (25 mg total) by mouth every evening.    sodium bicarbonate 650 MG tablet Take 1 tablet (650 mg total) by mouth 2 (two) times daily.    zolpidem (AMBIEN) 5 MG Tab Take 1 tablet (5 mg total) by mouth every evening.    furosemide (LASIX) 20 MG tablet Take 2 tablets (40 mg total) by mouth once daily. HOLD UNTIL FOLLOW UP WITH PCP    ondansetron (ZOFRAN) 4 MG tablet Take 4 mg by mouth as needed for Nausea.    pantoprazole (PROTONIX) 20 MG tablet TAKE 1 TABLET ONE TIME DAILY    patiromer calcium sorbitex (VELTASSA) 8.4 gram PwPk Take 1 packet (8.4 g total) by mouth once daily    polyethylene glycol (GLYCOLAX) 17 gram PwPk Take 17 g by mouth once daily.    temazepam (RESTORIL) 30 mg capsule Take 1 capsule (30 mg total) by mouth nightly as needed for Insomnia. FOR INSOMNIA    tiZANidine 4 mg Cap Take 2 mg by mouth nightly as needed (muscle spasm).    vitamin E 400 UNIT capsule Take 400 Units by mouth once daily.     Family History       Problem Relation (Age of Onset)    Breast cancer Mother, Maternal Grandmother    Cancer Mother (38)    Cataracts Cousin    Heart disease Maternal Grandmother    Hypertension Son          Tobacco Use    Smoking status: Never     Passive exposure: Never    Smokeless tobacco: Never   Substance and Sexual Activity    Alcohol  use: Never     Alcohol/week: 0.0 standard drinks of alcohol    Drug use: No    Sexual activity: Not Currently     Partners: Male     Birth control/protection: See Surgical Hx     Review of Systems   Constitutional: Negative for chills, diaphoresis and night sweats.   Cardiovascular:  Positive for chest pain (pressure like, left sided). Negative for dyspnea on exertion, irregular heartbeat, leg swelling, near-syncope, palpitations and paroxysmal nocturnal dyspnea.   Respiratory:  Negative for cough, shortness of breath and wheezing.    Skin:  Negative for color change and dry skin.   Musculoskeletal:  Negative for joint pain and joint swelling.   Gastrointestinal:  Negative for abdominal pain, diarrhea, nausea and vomiting.   Genitourinary:  Negative for dysuria.   Neurological:  Negative for dizziness, headaches, light-headedness and weakness.   All other systems reviewed and are negative.    Objective:     Vital Signs (Most Recent):  Temp: 99 °F (37.2 °C) (11/14/24 0845)  Pulse: 84 (11/14/24 1103)  Resp: 18 (11/14/24 0856)  BP: (!) 162/97 (11/14/24 0845)  SpO2: 100 % (11/14/24 1103) Vital Signs (24h Range):  Temp:  [98 °F (36.7 °C)-99 °F (37.2 °C)] 99 °F (37.2 °C)  Pulse:  [74-94] 84  Resp:  [4-20] 18  SpO2:  [97 %-100 %] 100 %  BP: (145-167)/(72-97) 162/97     Weight: 65.9 kg (145 lb 4.5 oz)  Body mass index is 26.57 kg/m².    SpO2: 100 %         Intake/Output Summary (Last 24 hours) at 11/14/2024 1136  Last data filed at 11/14/2024 0525  Gross per 24 hour   Intake 120 ml   Output 600 ml   Net -480 ml       Lines/Drains/Airways       Drain  Duration             Female External Urinary Catheter w/ Suction 11/14/24 0357 <1 day              Peripheral Intravenous Line  Duration                  Peripheral IV - Single Lumen 11/13/24 1214 Anterior;Proximal;Right Forearm <1 day                     Physical Exam  Constitutional:       General: She is not in acute distress.     Appearance: Normal appearance.   HENT:       Head: Normocephalic.      Nose: No congestion.      Mouth/Throat:      Mouth: Mucous membranes are moist.   Cardiovascular:      Rate and Rhythm: Normal rate and regular rhythm.      Heart sounds: No murmur heard.  Pulmonary:      Effort: No respiratory distress.      Breath sounds: Normal breath sounds. No wheezing or rales.   Abdominal:      General: Bowel sounds are normal.   Musculoskeletal:         General: No tenderness.      Right lower leg: No edema.      Left lower leg: No edema.   Skin:     General: Skin is warm.      Capillary Refill: Capillary refill takes less than 2 seconds.   Neurological:      General: No focal deficit present.      Mental Status: She is alert and oriented to person, place, and time.          Significant Labs: BMP:   Recent Labs   Lab 11/12/24  1216 11/13/24  0448 11/14/24  0557   GLU 92 124* 99    141 141   K 4.2 4.9 4.2    102 105   CO2 22* 23 26   BUN 59* 59* 58*   CREATININE 4.1* 4.7* 4.1*   CALCIUM 9.3 9.4 8.6*   MG 2.1 2.1 2.2   , CBC   Recent Labs   Lab 11/12/24  1216 11/13/24  0448 11/14/24  0557   WBC 3.12* 3.19* 3.72*   HGB 10.1* 9.9* 9.9*   HCT 30.9* 31.1* 31.7*    204 190   , and Troponin   Recent Labs   Lab 11/12/24  1808 11/12/24  2153 11/13/24  0824   TROPONINI 0.055* 0.059* 0.031*       Significant Imaging: Echocardiogram: Transthoracic echo (TTE) complete (Cupid Only):   Results for orders placed or performed during the hospital encounter of 07/19/24   Echo Saline Bubble? Yes   Result Value Ref Range    BSA 1.83 m2    LVOT stroke volume 81.51 cm3    LVIDd 4.09 3.5 - 6.0 cm    LV Systolic Volume 21.41 mL    LV Systolic Volume Index 12.0 mL/m2    LVIDs 2.46 2.1 - 4.0 cm    LV Diastolic Volume 73.65 mL    LV Diastolic Volume Index 41.38 mL/m2    Left Ventricular End Systolic Volume by Teichholz Method 21.41 mL    Left Ventricular End Diastolic Volume by Teichholz Method 73.65 mL    IVS 1.08 0.6 - 1.1 cm    LVOT diameter 2.01 cm    LVOT area 3.2 cm2     FS 40 28 - 44 %    Left Ventricle Relative Wall Thickness 0.43 cm    PW 0.87 0.6 - 1.1 cm    LV mass 127.01 g    LV Mass Index 71 g/m2    MV Peak E Jacky 0.82 m/s    TDI LATERAL 0.13 m/s    TDI SEPTAL 0.07 m/s    E/E' ratio 8.20 m/s    MV Peak A Jacky 0.72 m/s    TR Max Jacky 2.8 m/s    E/A ratio 1.14     IVRT 91.34 msec    E wave deceleration time 168.57 msec    LV SEPTAL E/E' RATIO 11.71 m/s    LV LATERAL E/E' RATIO 6.31 m/s    LVOT peak jacky 1.28 m/s    Left Ventricular Outflow Tract Mean Velocity 0.89 cm/s    Left Ventricular Outflow Tract Mean Gradient 3.61 mmHg    RVOT peak VTI 11.8 cm    TAPSE 1.56 cm    RV/LV Ratio 0.79 cm    LA size 4.06 cm    AV mean gradient 6 mmHg    AV peak gradient 10 mmHg    Ao peak jacky 1.60 m/s    Ao VTI 35.80 cm    LVOT peak VTI 25.70 cm    AV valve area 2.28 cm²    AV Velocity Ratio 0.80     AV index (prosthetic) 0.72     PRESLEY by Velocity Ratio 2.54 cm²    Triscuspid Valve Regurgitation Peak Gradient 31 mmHg    PV mean gradient 1 mmHg    PV PEAK VELOCITY 0.77 m/s    PV peak gradient 2 mmHg    Pulmonary Valve Mean Velocity 0.63 m/s    RVOT peak jacky 0.63 m/s    Ao root annulus 3.18 cm    STJ 2.98 cm    Ascending aorta 3.30 cm    Mean e' 0.10 m/s    ZLVIDS -1.68     ZLVIDD -1.85     RVDD 3.23 cm    TV resting pulmonary artery pressure 34 mmHg    RV TB RVSP 6 mmHg    Est. RA pres 3 mmHg    Narrative      Left Ventricle: The left ventricle is normal in size. Normal wall   thickness. There is normal systolic function with a visually estimated   ejection fraction of 55 - 60%. There is normal diastolic function.    Right Ventricle: Normal right ventricular cavity size. Wall thickness   is normal. Systolic function is normal.    Left Atrium: Patent foramen ovale visualized with predominant right to   left shunting indicated by saline contrast.    Tricuspid Valve: There is mild regurgitation.    IVC/SVC: Normal venous pressure at 3 mmHg.    The patient is status post cardiac transplantation.

## 2024-11-14 NOTE — HPI
"Patient is a 69-year-old female with medical history of heart transplant 2/2 cardiomyopathy (1993 on cyclosporine), HFpEF, DCIS s/p radiation 2022, CKD 5, Stroke (Right MCA) 7/2024. Initially presented to the outpatient clinic for routine dobutamine stress echo. Developed chest pain and palpitation during stress test then developed SOB, wheezes when nitro spray x 2 was given for chest pain. Taken to ED in [facility]. Symptoms resolved after epi, steroids, famotidine, ativan. Admitted there for observation. The next day patient developed chest, appears to be cardiac in nature and was recurrent. Morphine IV given a few times for CP, developed respiratory depression needing narcan with symptoms. Patient was transferred to [facility] for higher level of care. General cardiology consulted for recurrent CP and transplant cardiology consulted as patient is on cyclosporin post transplant.   Upon evaluation patient reports her chest pain has improved to 4/10, pressure like "someone sitting on the chest", left sided, non radiating, worsened with activity and morphine provided some relief. Denies similar pain in the past. Denies taking NTG SL since the transplant. Prior to transplant (1993) she used to take NTG SL which would provide relief. Reports able to perform ADL. Uses walker for support after her CVA. Denies DONOHUE, CP, Palpitation, SOB, N/V, Diaphoresis prior to the stress echo.   Troponin was slight elevated at previous facility but trended down on repeat. EKG did not show acute ischemic changes. There was NSR, RBBB, left anterior fascicular block that was present on previous EKG. Patient reports morphine has been helping with the pain. Pt sees nephrology for CKD 5 and there was discussion about dialysis which patient hasn't decided on yet. She able to produce urine.         "

## 2024-11-14 NOTE — ASSESSMENT & PLAN NOTE
Patient has heart transplant Hx, HFpEF, Stroke, CKD 5. Developed chest pain and palpitation during stress but EKG part of the test was neg for ischemia. Due to persistent CP given NGT spray leading to allergic reaction. Recurrent CP episodes with initial elevation of troponin which trended down on repeat. Since patient had heart transplant and pain appears to be cardiac in nature. Patient transferred to [facility] for higher level care.     Troponin 0.055--> 0.059--> 0.031   EKG: NSR w/PACs , RBBB, left anterior fascicular no acute ischemic changes when compared to previous EKG these findings were present  Dobutamine Stress Echo 11/12/2024: ECG Conclusion: The ECG portion of the study is negative for ischemia.  Post-stress Conclusion: The study is negative with no echocardiographic evidence of stress induced ischemia. Unable to get post stress images due to severe chest pain.   Physical exam w/o sign of volume overload.     Patient reports her pain has improved after morphine and dilaudid also helps but does not completely resolve the symptoms. NTG SL used to help prior to heart transplant (1993) but reports never needing NTG since transplant. Now allergic reaction to Nitro spray?. Ideally the patient would need coronary angio but given her renal function that is out of picture.      - Recommend Cardiac PET scan    - Cardiology will continue to follow

## 2024-11-14 NOTE — ASSESSMENT & PLAN NOTE
Patient was oversedated on 11/12 after receiving 2mg IV dilaudid + 2mg Ativan after repeat bout of chest pain.  She had received morphine and ativan in the morning.   Patient has CKD 4 - and with repeated doses of opiates likely experienced reduced metabolization, stacking and addition of repeated benzodiazepene doses     Modified dosages of PRN pain medication.     -Reduced the PRN benadryl dosage - but if no use in 12-24hrs - would discontinue this medication.   Hypersensitivity Allergy concerns seem resolved at this time.     Also modified ativan PRN order- would only give for refractory convulsions.  DO NOT give Ativan for chest pain complaints.

## 2024-11-14 NOTE — PLAN OF CARE
[facility] - Neurology  Plan of Care    HPI:  Neurology reviewed the chart and information about Ms Nadia Damon, a 69 y.o. AAF with a history of heart transplant (1993), HFpEF, breast cancer, CKD Stage 4, stroke (July 2024), and prior C. Diff infection, admitted to [facility] on 11/12 after experiencing severe chest pain following a dobutamine stress test. During the test, she reported aching left chest pain, later escalating to severe 10/10 pain, prompting EMS transport to the ED, where she was managed for possible adverse drug reactions, respiratory distress, and wheezing. Following improvement with interventions (racemic epinephrine, IV Solu-Medrol, IV famotidine, and IV Ativan), she was admitted for observation.  Later on 11/12, at 21:00, rapid response was initiated for seizure-like activity characterized by diffuse convulsions; however, the patient remained non-verbal only briefly, immediately regaining speech upon grabbing a providers hand toward the end of the event. The episode lasted approximately 2 minutes, with her oxygen saturation remaining stable at 100% and HR in the 100-120 range. 1 mg IV Ativan was administered, providing gradual symptom relief. Post-episode, she described increased physical symptoms associated with pain and anxiety, without clear postictal confusion, as she remained alert and oriented without neurological deficits. On further questioning, she recalled the event and demonstrated similar jerking movements, raising concerns about muscle spasms or anxiety-related symptoms, especially given the physical and emotional stressors experienced that day. She underwent a tele-neurology consult, where the suspicion of non-epileptiform seizure-like activity was raised, with a plan for EEG to further clarify.    Assessment:  From a neurological perspective, the patient, with complex comorbidities and recent adverse reactions to medication, displayed an isolated episode of  seizure-like activity without subsequent reported neurological deficits or postictal symptoms, pointing away from epileptic events. Given her description and recall of the episode, as well as a preceding heightened state of anxiety, it is less likely that the event represented true seizure activity.     The patients seizure-like activity is atypical for epileptic seizures, given the lack of postictal state, intact memory of the episode. The pattern suggests possible non-epileptic events, such as anxiety or somatization disorder, particularly considering her recent intense physical and psychological stressors. Her history of stroke and chronic insomnia medications pose some risk for seizures; however, the clinical picture does not support an epileptiform event as a likely etiology.    Recommendations:  -- No indications for EEG, but can obtain 30 minute EEG if concerned for seizures  -- No AED initiation indicated given low suspicion for true seizure activity  -- Monitor for recurrent episodes; no additional neurology follow-up required unless EEG is abnormal  -- Provide reassurance regarding the non-epileptic nature of symptoms, with stress/anxiety management strategies if needed

## 2024-11-14 NOTE — PROGRESS NOTES
EEG Hook up  No sign of skin breakdown noticed  Small scratches at nasion seen    Skin Integrity: Mild     Fabio Mohan   11/14/2024 9:23 AM

## 2024-11-14 NOTE — H&P
Tray Fischer - Transplant [facility] Medicine  History & Physical    Patient Name: Nadia Damon  MRN: [id number]  Patient Class: OP- Observation  Admission Date: 11/13/2024  Attending Physician: Brynn You MD [facility] MED D  Admitting Physician: Moreno Ordoñez MD  Primary Care Provider: Elis Wick MD      Patient information was obtained from patient, past medical records, and ER records.     Subjective:     Principal Problem:Seizure-like activity    Chief Complaint:   Chief Complaint   Patient presents with    Medication Reaction     Transfer from [facility] for adverse drug reaction and seizure like activity - higher level of care evaluation        HPI: 70 y/o AAF s/p Heart Transplant (1993), HFpEF, Breast Cancer, CKD Stage 4, Stroke (July 2024) hx of C diff admitted to [facility] on November 12 with chest pain after a dobutamine stress test as an outpatient.     11/12 Morning - She had outpatient stress echocardiogram, receive the normal dose of dobutamine for the test thought reports that she started experiencing aching in her left chest as soon as infusion was started, she ultimately completed the stress echo but immediately after had severe 10/10 left chest pain, in the cardiac stress lab she was administered 2 sprays of nitroglycerin, which she states after receiving she developed severe shortness of breath and in notes is reported to have audible wheezing.  She was also given 325 mg chewable aspirin, EMS was called and she was transported to [facility] emergency department.      On arrival to the emergency department and there was concern she had active respiratory distress, audible wheezing and possibly stridor, she was given racemic epinephrine nebulizer, IV Solu-Medrol and IV famotidine and IV Ativan, emergency room documentation indicates after this her stridor and wheezing fully resolved, work of breathing returned to normal and patient was speaking in  "full sentences without distress.  Her case was discussed with Hospital Medicine, Cardiology and Dr. Tubbs w/ transplant cardiology and decision made to admit her at [facility] for observation evaluation of adverse drug reaction to dobutamine and nitroglycerin spray.      Evening of 11/12 21:00 rapid response called after she had been admitted for concerns of possible seizure-like activity with diffuse convulsions per documentation "Patient nonverbal during episode which lasted around 2 minutes. Toward end of event patient grabbed one of providers hands and immediately began speaking. She was not post-ictal. Patient continued to shake but was awake and oriented x 4 without neurological deficits. Oxygen saturation remain 100% through event. -120's. 1mg Ativan IV given with gradual relief in symptoms. She reports increased physical symptoms related to increase pain or anxiety. Prior to event nurse reports patient complained of dyspnea while checking vitals. HR improved to 80's and jerking movements are minimal. "    During her stay she underwent Neurology tele-consultation with an assessment of seizure-like activity, suspected non epileptiform.  No AED was indicated at present.  Recommendations included EEG.  Currently she is awake and alert with no new neurologic deficits.     On the morning of 11/13, she had another episode of chest pain with left arm numbness, diaphoresis, tachycardia, and tachypnea.  Repeat troponin was 0.031 which was lower than the values from the night prior.  She received (08:20) Morphine 4mg IV, 2mg IV Ativan, PO Maalox without relief.  Cardiology contacted and patient was given Therapeutic loading dose of lovenox 70mg/kg, 1mg IV dilaudid (09:20), and Gi cocktail.  After this event Cardiology recommended transfer to [facility] for higher level of care. Repeat EKG at this time showed Sinus tachycardia w/ PACs     Subsequently @ 11:12 AM she had repeat chest pain 10/10 reported, on-call " hospital med notified and patient was given Dilaudid 2mg IV + 2mg IV ativan at same time and at 12:06 patient noted to have severe respiratory depression - RR 2-4/min and emergently Narcan 0.4mg IV given (12:10) to patient, which reportedly brought pt back to baseline but also resulted in her crying due to recurrence of chest pain.     BEDSIDE EVAL  -Tonight patient seen on medical hansen - she is sleeping but awakens easily.  RN on floor noted that patient complaint of chest pain on arrival, I had continued medications from previous admission on her arrival and pt given dilaudid 1mg and subsequently bendaryl IV for c/o of itching.    Patient reports overall her chest discomfort is much better than inciting event on 11/12.    She reports suffering a stroke on her birthday [birth year] this year, required Inpatient rehab and home health - has moved from using wheelchair --> walker -- > now using cane.  She lives in an assisted living apartment facility.   On review of home medications - reports being on both Temazepam + Zolpidem for many decades due to chronic insomnia.   Patient is tangential in discussion of her symptoms, mentioning visits issues that have occurred in past, she does mention repeatedly that in addition to recent stroke, she is having progressive CKD - now stage 4.  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  Nephrolgist has discussed initiating/prepping her hemodialysis but patient has not committed to this yet.        Past Medical History:   Diagnosis Date    Abdominal wall hernia     CT Renal 6/11/2018---Small fat containing superior ventral abdominal wall hernia at the epicardial pacing lead site.    Abnormal mammogram 10/12/2021    Acute cystitis without hematuria 05/10/2022    Acute ischemic right middle cerebral artery (MCA) stroke 07/20/2024    GRACE (acute kidney injury) 11/22/2021    Anxiety     Aphasia 07/19/2024    Arthritis      ZEN HIPS    Breast cancer in female 08/2021    LEFT BREAST    Bronchitis 08/18/2016    Never smoked      C. difficile colitis 11/29/2021    Cellulitis of axilla, left 12/23/2021    Chronic diastolic heart failure 12/16/2021    Chronic kidney disease     stage 4, GFR 15-29 ml/min    Chronic midline low back pain without sciatica 06/18/2018    Closed nondisplaced fracture of distal phalanx of left great toe with routine healing 10/22/2018    Coronary artery disease 1993    heart transplant    COVID-19 in immunocompromised patient 02/26/2024    Cystitis 05/10/2022    Depression     Encounter for blood transfusion     Encounter for palliative care 10/14/2024    Fibromyalgia     on Lyrica    Heart failure     native heart cardiomyopathy    Heart transplanted 1993    due to cardiomyopathy    History of hyperparathyroidism; Hyperparathyroidism, secondary renal     PT DENIES    Hypertension     Immune disorder     anti rejection meds    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    Impaired mobility 07/28/2022    Iron deficiency anemia 08/15/2017    Kidney stones     passed per pt    Obesity     Obesity (BMI 30.0-34.9) 07/22/2019    Other osteoporosis without current pathological fracture 08/30/2019    Other pancytopenia 05/06/2024    Parotitis, acute 09/19/2023    Pharyngitis 01/09/2019    Pneumonia due to infectious organism 03/14/2024    PONV (postoperative nausea and vomiting)     Severe sepsis 11/22/2021    Shingles 2003 approx    left leg    Subclinical hypothyroidism 06/16/2023    Thrombocytopenia, unspecified 11/29/2021    Trouble in sleeping     Urinary incontinence        Past Surgical History:   Procedure Laterality Date    bladder implant Right 06/11/2024    BLADDER SURGERY  2015 approx    mesh - Dr Everett then 2nd reconstructive sx Dr Onofre    BREAST BIOPSY Bilateral     NEGATIVE    BREAST BIOPSY Right 10/31/2022    benign    BREAST LUMPECTOMY Left 2021    BREAST SURGERY Left 09/28/2015     Bx - benign    BREAST SURGERY Right 12/2015    Bx benign    CARDIAC PACEMAKER REMOVAL Left 06/26/2014    Pacer defirillator removed. Put in 1993 aat time of heart transplant    CARPAL TUNNEL RELEASE Left 03/03/2015    Dr. Hall    COLONOSCOPY N/A 02/25/2021    Procedure: COLONOSCOPY;  Surgeon: Freida Ramirez MD;  Location: [facility] ENDO;  Service: Endoscopy;  Laterality: N/A;    CYSTOCELE REPAIR      Twice with mesh removal    EPIDURAL STEROID INJECTION INTO CERVICAL SPINE N/A 02/02/2023    Procedure: T11/T12 IL HELLEN;  Surgeon: Jassi Pierre MD;  Location: [facility] PAIN MGT;  Service: Pain Management;  Laterality: N/A;    EPIDURAL STEROID INJECTION INTO CERVICAL SPINE N/A 9/16/2024    Procedure: T11/T12 IL HELLEN;  Surgeon: Jassi Pierre MD;  Location: [facility] PAIN MGT;  Service: Pain Management;  Laterality: N/A;    HEART TRANSPLANT  1993    HERNIA REPAIR Right 1971 approx    Inguinal    HYSTERECTOMY  1983    vag hyst /LSO     IMPLANTATION OF PERMANENT SACRAL NERVE STIMULATOR N/A 06/11/2024    Procedure: INSERTION, NEUROSTIMULATOR, PERMANENT, SACRAL;  Surgeon: Sami Cochran MD;  Location: [facility] OR;  Service: Urology;  Laterality: N/A;    INCISION AND DRAINAGE OF ABSCESS Left 12/24/2021    Procedure: INCISION AND DRAINAGE, ABSCESS;  Surgeon: Joseph Longo MD;  Location: [facility] OR;  Service: General;  Laterality: Left;    INJECTION OF ANESTHETIC AGENT AROUND MEDIAL BRANCH NERVES INNERVATING LUMBAR FACET JOINT Right 10/19/2022    Procedure: Right L4/L5 and L5/S1 MBB;  Surgeon: Jassi Pierre MD;  Location: [facility] PAIN MGT;  Service: Pain Management;  Laterality: Right;    INJECTION OF ANESTHETIC AGENT AROUND MEDIAL BRANCH NERVES INNERVATING LUMBAR FACET JOINT Right 11/09/2022    Procedure: Right L4/L5 and L5/S1 MBB;  Surgeon: Jassi Pierre MD;  Location: [facility] PAIN MGT;  Service: Pain Management;  Laterality: Right;    INJECTION OF ANESTHETIC AGENT INTO SACROILIAC JOINT Right 08/22/2022    Procedure: Right  SIJ Injection Right L5/S1 Facte Injection;  Surgeon: Jassi Pierre MD;  Location: [facility] PAIN MGT;  Service: Pain Management;  Laterality: Right;    INSERTION OF TUNNELED CENTRAL VENOUS CATHETER (CVC) WITH SUBCUTANEOUS PORT N/A 11/09/2021    Procedure: [id number];  Surgeon: Christoph Douglas MD;  Location: [facility] OR;  Service: General;  Laterality: N/A;    RADIOFREQUENCY THERMOCOAGULATION Right 12/07/2022    Procedure: Right L4/L5 and L5/S1 Lumbar RFA;  Surgeon: Jassi Pierre MD;  Location: [facility] PAIN MGT;  Service: Pain Management;  Laterality: Right;    REMOVAL OF VASCULAR ACCESS PORT      ROBOT-ASSISTED LAPAROSCOPIC ABDOMINAL SACROCOLPOPEXY N/A 08/10/2023    Procedure: ROBOTIC SACROCOLPOPEXY, ABDOMEN;  Surgeon: PRANAY Villalobos MD;  Location: [facility];  Service: OB/GYN;  Laterality: N/A;    ROBOT-ASSISTED LAPAROSCOPIC OOPHORECTOMY Right 08/10/2023    Procedure: ROBOTIC OOPHORECTOMY;  Surgeon: PRANAY Villalobos MD;  Location: [facility] OR;  Service: OB/GYN;  Laterality: Right;    SENTINEL LYMPH NODE BIOPSY Left 10/12/2021    Procedure: BIOPSY, LYMPH NODE, SENTINEL;  Surgeon: Christoph Douglas MD;  Location: [facility] OR;  Service: General;  Laterality: Left;    TOE SURGERY      XI ROBOTIC URETHROPEXY N/A 08/10/2023    Procedure: XI ROBOTIC URETHROPEXY;  Surgeon: PRANAY Villalobos MD;  Location: [facility] OR;  Service: OB/GYN;  Laterality: N/A;       Review of patient's allergies indicates:   Allergen Reactions    Lisinopril Swelling and Rash    Nitro Shortness Of Breath     Audible wheezing     Hydrocodone-acetaminophen Nausea Only    Augmentin [amoxicillin-pot clavulanate] Diarrhea    Zyvox [linezolid] Nausea And Vomiting       Current Facility-Administered Medications on File Prior to Encounter   Medication    [COMPLETED] enoxaparin injection 70 mg    [COMPLETED] HYDROmorphone (PF) injection 1 mg    [COMPLETED] LIDOcaine viscous HCl 2% oral solution 15 mL    [COMPLETED] LORazepam injection 2 mg    [DISCONTINUED]  acetaminophen tablet 650 mg    [DISCONTINUED] ALPRAZolam tablet 0.25 mg    [DISCONTINUED] aluminum-magnesium hydroxide-simethicone 200-200-20 mg/5 mL suspension 30 mL    [DISCONTINUED] aluminum-magnesium hydroxide-simethicone 200-200-20 mg/5 mL suspension 30 mL    [DISCONTINUED] aspirin EC tablet 81 mg    [DISCONTINUED] calcitRIOL capsule 0.25 mcg    [DISCONTINUED] calcium carbonate 200 mg calcium (500 mg) chewable tablet 1,000 mg    [DISCONTINUED] carvediloL tablet 3.125 mg    [DISCONTINUED] cycloSPORINE modified (NEORAL) (NEORAL) capsule 75 mg    [DISCONTINUED] dextrose 10% bolus 125 mL 125 mL    [DISCONTINUED] dextrose 10% bolus 250 mL 250 mL    [DISCONTINUED] diphenhydrAMINE injection 25 mg    [DISCONTINUED] enoxaparin injection 30 mg    [DISCONTINUED] famotidine IVPB 20 mg    [DISCONTINUED] glucagon (human recombinant) injection 1 mg    [DISCONTINUED] glucose chewable tablet 16 g    [DISCONTINUED] glucose chewable tablet 24 g    [DISCONTINUED] HYDROmorphone (PF) injection 1 mg    [DISCONTINUED] HYDROmorphone (PF) injection 2 mg    [DISCONTINUED] LORazepam injection 2 mg    [DISCONTINUED] LORazepam injection 2 mg    [DISCONTINUED] morphine injection 4 mg    [DISCONTINUED] naloxone 0.4 mg/mL injection 0.02 mg    [DISCONTINUED] naloxone 0.4 mg/mL injection 0.4 mg    [DISCONTINUED] ondansetron injection 4 mg    [DISCONTINUED] polyethylene glycol packet 17 g    [DISCONTINUED] pregabalin capsule 25 mg    [DISCONTINUED] prochlorperazine injection Soln 5 mg    [DISCONTINUED] racepinephrine 2.25 % nebulizer solution 0.5 mL    [DISCONTINUED] sodium bicarbonate tablet 650 mg    [DISCONTINUED] sodium chloride 0.9% flush 10 mL    [DISCONTINUED] zolpidem tablet 5 mg     Current Outpatient Medications on File Prior to Encounter   Medication Sig    aspirin (ECOTRIN) 81 MG EC tablet Take 1 tablet (81 mg total) by mouth once daily.    calcitRIOL (ROCALTROL) 0.25 MCG Cap Take 1 capsule (0.25 mcg total) by mouth once daily.     carvediloL (COREG) 3.125 MG tablet Take 1 tablet (3.125 mg total) by mouth 2 (two) times daily with meals. Hold parameters: SBP less than 110 and/or DBP less than 75    cycloSPORINE modified, NEORAL, (NEORAL) 25 MG capsule Take 3 capsules (75 mg total) by mouth 2 (two) times daily.    furosemide (LASIX) 20 MG tablet Take 2 tablets (40 mg total) by mouth once daily. HOLD UNTIL FOLLOW UP WITH PCP    ondansetron (ZOFRAN) 4 MG tablet Take 4 mg by mouth as needed for Nausea.    pantoprazole (PROTONIX) 20 MG tablet TAKE 1 TABLET ONE TIME DAILY    patiromer calcium sorbitex (VELTASSA) 8.4 gram PwPk Take 1 packet (8.4 g total) by mouth once daily    polyethylene glycol (GLYCOLAX) 17 gram PwPk Take 17 g by mouth once daily.    pregabalin (LYRICA) 25 MG capsule Take 1 capsule (25 mg total) by mouth every evening.    sodium bicarbonate 650 MG tablet Take 1 tablet (650 mg total) by mouth 2 (two) times daily.    temazepam (RESTORIL) 30 mg capsule Take 1 capsule (30 mg total) by mouth nightly as needed for Insomnia. FOR INSOMNIA    tiZANidine 4 mg Cap Take 2 mg by mouth nightly as needed (muscle spasm).    vitamin E 400 UNIT capsule Take 400 Units by mouth once daily.    zolpidem (AMBIEN) 5 MG Tab Take 1 tablet (5 mg total) by mouth every evening.     Family History       Problem Relation (Age of Onset)    Breast cancer Mother, Maternal Grandmother    Cancer Mother (38)    Cataracts Cousin    Heart disease Maternal Grandmother    Hypertension Son          Tobacco Use    Smoking status: Never     Passive exposure: Never    Smokeless tobacco: Never   Substance and Sexual Activity    Alcohol use: Never     Alcohol/week: 0.0 standard drinks of alcohol    Drug use: No    Sexual activity: Not Currently     Partners: Male     Birth control/protection: See Surgical Hx     Review of Systems   Constitutional:  Negative for activity change and fatigue.   HENT:  Negative for trouble swallowing (reports wearing dentures and prefers soft  foods).    Respiratory:  Negative for chest tightness, shortness of breath and wheezing.    Cardiovascular:  Negative for chest pain and palpitations.   Genitourinary:  Negative for difficulty urinating and dysuria.   Neurological:  Positive for weakness.   Psychiatric/Behavioral:  The patient is nervous/anxious.      Objective:     Vital Signs (Most Recent):  Temp: 98.1 °F (36.7 °C) (11/13/24 2247)  Pulse: 94 (11/13/24 2247)  Resp: 17 (11/13/24 2247)  BP: (!) 165/72 (11/13/24 2247)  SpO2: 100 % (11/13/24 2247) Vital Signs (24h Range):  Temp:  [98 °F (36.7 °C)-98.9 °F (37.2 °C)] 98.1 °F (36.7 °C)  Pulse:  [74-95] 94  Resp:  [4-20] 17  SpO2:  [97 %-100 %] 100 %  BP: (142-167)/(72-93) 165/72     Weight: 65.9 kg (145 lb 4.5 oz)  Body mass index is 26.57 kg/m².     Physical Exam  Vitals and nursing note reviewed.   Constitutional:       General: She is not in acute distress.     Appearance: She is overweight. She is ill-appearing.      Interventions: Nasal cannula in place.   HENT:      Right Ear: Hearing normal.      Left Ear: Hearing normal.      Nose: No rhinorrhea.      Right Sinus: No maxillary sinus tenderness or frontal sinus tenderness.      Left Sinus: No maxillary sinus tenderness or frontal sinus tenderness.      Mouth/Throat:      Mouth: Mucous membranes are moist. No oral lesions.      Pharynx: Uvula midline.   Eyes:      Pupils: Pupils are equal, round, and reactive to light.   Neck:      Comments: On auscultation of neck if patient leans back there is upper airway bronchophony that resolves if she leans forward,  I do not think true stridor, suspect patient augmented  Cardiovascular:      Rate and Rhythm: Normal rate and regular rhythm.      Pulses:           Dorsalis pedis pulses are 2+ on the right side and 2+ on the left side.      Heart sounds: S1 normal and S2 normal. Murmur heard.   Pulmonary:      Effort: Pulmonary effort is normal. Tachypnea present. No respiratory distress.      Breath sounds:  "Normal breath sounds. No wheezing.   Abdominal:      General: Bowel sounds are normal. There is no distension.      Palpations: Abdomen is soft. There is no mass.      Tenderness: There is no abdominal tenderness.      Hernia: A hernia (ventral) is present.   Musculoskeletal:         General: Normal range of motion.      Right lower leg: No edema.      Left lower leg: No edema.   Lymphadenopathy:      Upper Body:      Right upper body: No supraclavicular adenopathy.      Left upper body: No supraclavicular adenopathy.   Skin:     General: Skin is warm and dry.      Capillary Refill: Capillary refill takes less than 2 seconds.      Findings: No rash.   Neurological:      Mental Status: She is alert and oriented to person, place, and time.   Psychiatric:         Mood and Affect: Mood is anxious. Mood is not depressed.         Speech: Speech is not rapid and pressured.         Behavior: Behavior is not hyperactive. Behavior is cooperative.         Thought Content: Thought content normal.         Judgment: Judgment normal.              CRANIAL NERVES     CN III, IV, VI   Pupils are equal, round, and reactive to light.       Significant Labs: All pertinent labs within the past 24 hours have been reviewed.  ABGs: No results for input(s): "PH", "PCO2", "HCO3", "POCSATURATED", "BE", "TOTALHB", "COHB", "METHB", "O2HB", "POCFIO2", "PO2" in the last 48 hours.  CBC:   Recent Labs   Lab 11/12/24  1216 11/13/24  0448   WBC 3.12* 3.19*   HGB 10.1* 9.9*   HCT 30.9* 31.1*    204     CMP:   Recent Labs   Lab 11/12/24  1216 11/13/24  0448    141   K 4.2 4.9    102   CO2 22* 23   GLU 92 124*   BUN 59* 59*   CREATININE 4.1* 4.7*   CALCIUM 9.3 9.4   PROT 8.3 7.8   ALBUMIN 3.8 3.5   BILITOT 0.5 0.4   ALKPHOS 140 107   AST 50* 40   ALT 32 30   ANIONGAP 14 16     Cardiac Markers:   Recent Labs   Lab 11/12/24  1216   *     Coagulation:   Recent Labs   Lab 11/12/24  1216   INR 1.0   APTT 28.3     Lactic Acid: No " "results for input(s): "LACTATE" in the last 48 hours.  Magnesium:   Recent Labs   Lab 11/12/24  1216 11/13/24  0448   MG 2.1 2.1     Troponin:   Recent Labs   Lab 11/12/24  1808 11/12/24  2153 11/13/24  0824   TROPONINI 0.055* 0.059* 0.031*     TSH:   Recent Labs   Lab 11/12/24  1216   TSH 10.030*     Urine Studies: No results for input(s): "COLORU", "APPEARANCEUA", "PHUR", "SPECGRAV", "PROTEINUA", "GLUCUA", "KETONESU", "BILIRUBINUA", "OCCULTUA", "NITRITE", "UROBILINOGEN", "LEUKOCYTESUR", "RBCUA", "WBCUA", "BACTERIA", "SQUAMEPITHEL", "HYALINECASTS" in the last 48 hours.    Invalid input(s): "WRIGHTSUR"    Significant Imaging: I have reviewed all pertinent imaging results/findings within the past 24 hours.  CT HEAD WITHOUT CONTRAST     CLINICAL HISTORY:  Seizure, new-onset, no history of trauma;     TECHNIQUE:  Noncontrast images were obtained     COMPARISON:  CT brain 07/25/2024.     FINDINGS:  No intracranial acute hemorrhage or acute focal brain parenchymal abnormality is identified.  Calvarium is intact.     Impression:     No acute intracranial abnormality.     All CT scans at this facility use dose modulation, iterative reconstructions, and/or weight base dosing when appropriate to reduce radiation dose to as low as reasonably achievable.        Electronically signed by: Jackie Reyes MD  Date:                                            11/12/2024  Time:                                           13:02        Exam Ended: 11/12/24 12:38 CST       Assessment/Plan:     * Seizure-like activity  1 occurrence on chart review - CT head negative   -Obtain extended EEG as per transfer center plan  -neurology consult.     Patient reports she had acutely worsening of her back pain that evening and when she will get muscle spasms - she demonstrated for us briefly uncontrolled spasm/convulsions that occur.   I also wonder if it may have been anxiety attack/somatization symptoms given the physical stress she had experienced " during the daytime of 11/12.    Patient indicates she remembers this event which would seem like seizure less likely.  However fulfill planned workup,  patient has had recent stroke, received benadryl IV earlier in the day - so definite seizure risk factors.         Adverse drug reaction  Based on interview and chart review tonight, suspect that patient had 2 reactions that contributed to her ED presentation.  She describes as soon as dobutamine was infusing that she started experiencing chest aching, she states she told staff.  Stress ECHO report indicates full dosage given and stress imaging completed, and patient's complaints were after test finished.     Her wheezing/respiratory distress and stridor - she attributes to the Nitroglycerin spray, she reports 1st spray went sublingual but 2nd spray adminstered went toward her pharynx and caused her immediate discomfort/distress, improper administration may be the cause.     Have added both to allergy profile.       Unresponsiveness  Patient was oversedated on 11/12 after receiving 2mg IV dilaudid + 2mg Ativan after repeat bout of chest pain.  She had received morphine and ativan in the morning.   Patient has CKD 4 - and with repeated doses of opiates likely experienced reduced metabolization, stacking and addition of repeated benzodiazepene doses     Modified dosages of PRN pain medication.     -Reduced the PRN benadryl dosage - but if no use in 12-24hrs - would discontinue this medication.   Hypersensitivity Allergy concerns seem resolved at this time.     Also modified ativan PRN order- would only give for refractory convulsions.  DO NOT give Ativan for chest pain complaints.       Chest pain  -Seems to be occurring less frequently   -nitroglycerin/dobutamine added to allergy/intolerance list     -as above lowered dosage of PRN pain medications going forward and would look to wean off.   -General cardiology consult in AM   -obtain ECHO to ensure no acute  changes/evolution since stress echo completed      DO NOT give Ativan+ Opiate for chest pain complaints.       Heart transplanted  -resume home anti-rejection medication Cylosporine - check serum level with Am labs   -HTS cardiology consult in AM - contact on-call service to notify of transfer/consult       Essential hypertension  Patients blood pressure range in the last 24 hours was: BP  Min: 145/87  Max: 167/93.The patient's inpatient anti-hypertensive regimen is listed below:  Current Antihypertensives  carvediloL tablet 3.125 mg, 2 times daily with meals, Oral    Plan  - BP is controlled, no changes needed to their regimen      Chronic diastolic (congestive) heart failure  Patient has Diastolic (HFpEF) heart failure that is Chronic. On presentation their CHF was well compensated. Most recent BNP and echo results are listed below.  Recent Labs     11/12/24  1216   *         Current Heart Failure Medications  carvediloL tablet 3.125 mg, 2 times daily with meals, Oral    Plan  - Monitor strict I&Os and daily weights.    - Place on telemetry  - Place on fluid restriction of 1.5 L.   - Cardiology has been consulted  - The patient's volume status is stable - BNP elevated but also renal function uptrending.   Appears euvolemic - F/u ECHO.  She was given IV lasix doses prior to transfer.   Evaluate in AM and f/u ECHo  - resume home diuretic lasix 40mg daily a appropriate          DDD (degenerative disc disease), thoracolumbar  - continue lyrica  -acetaminophen  -resume Tizanidine if acute spasms occur.  Pt taking 2mg dosage at home      Hemiparesis affecting right side as late effect of cerebrovascular accident (CVA)  Fall precautions   -consult PT/OT prior to discharge.       Gastroesophageal reflux disease without esophagitis  Pt on pantoprazole as outpatient - on famotidine at time of transfer.     -no recent EGD in chart, she has CKD and hx of C.diff - PPI is not a good medication for her.  Continue  famotidine and would discuss with patient discontinued pantoprazole at discharge.       CKD (chronic kidney disease) stage 4, GFR 15-29 ml/min  Chronic - uptrending in recent months   -trend chemistry daily       VTE Risk Mitigation (From admission, onward)           Ordered     heparin (porcine) injection 5,000 Units  Every 8 hours         11/13/24 2322     IP VTE HIGH RISK PATIENT  Once         11/13/24 2322     Place sequential compression device  Until discontinued         11/13/24 2322                       On 11/14/2024, patient should be placed in hospital observation services under my care.             Moreno Ordoñez MD  Department of Hospital Medicine  [facility]

## 2024-11-14 NOTE — ASSESSMENT & PLAN NOTE
- continue lyrica  -acetaminophen  -resume Tizanidine if acute spasms occur.  Pt taking 2mg dosage at home

## 2024-11-14 NOTE — ASSESSMENT & PLAN NOTE
Patients blood pressure range in the last 24 hours was: BP  Min: 145/87  Max: 167/93.The patient's inpatient anti-hypertensive regimen is listed below:  Current Antihypertensives  carvediloL tablet 3.125 mg, 2 times daily with meals, Oral    Plan  - BP is controlled, no changes needed to their regimen

## 2024-11-14 NOTE — CONSULTS
"[facility]  Cardiology  Consult Note    Patient Name: Nadia Damon  MRN: [id number]  Admission Date: 11/13/2024  Hospital Length of Stay: 0 days  Code Status: Full Code   Attending Provider: Nixon Page*   Consulting Provider: Valery Lin MD  Primary Care Physician: Elis Wick MD  Principal Problem:Seizure-like activity    Patient information was obtained from patient and ER records.     Inpatient consult to Cardiology  Consult performed by: Valery Lin MD  Consult ordered by: Moreno Ordoñez MD        Subjective:     Chief Complaint:  Recurrent Chest Pain     HPI:   Patient is a 69-year-old female with medical history of heart transplant 2/2 cardiomyopathy (1993 on cyclosporine), HFpEF, DCIS s/p radiation 2022, CKD 5, Stroke (Right MCA) 7/2024. Initially presented to the outpatient clinic for routine dobutamine stress echo. Developed chest pain and palpitation during stress test then developed SOB, wheezes when nitro spray x 2 was given for chest pain. Taken to ED in [facility]. Symptoms resolved after epi, steroids, famotidine, ativan. Admitted there for observation. The next day patient developed chest, appears to be cardiac in nature and was recurrent. Morphine IV given a few times for CP, developed respiratory depression needing narcan with symptoms. [place] [facility] [place]. General cardiology consulted for recurrent CP and transplant cardiology consulted as patient is on cyclosporin post transplant.   Upon evaluation patient reports her chest pain has improved to 4/10, pressure like "someone sitting on the chest", left sided, non radiating, worsened with activity and morphine provided some relief. Denies similar pain in the past. Denies taking NTG SL since the transplant. Prior to transplant (1993) she used to take NTG SL which would provide relief. Reports able to perform ADL. Uses walker for support after her CVA. Denies DONOHUE, CP, " Palpitation, SOB, N/V, Diaphoresis prior to the stress echo.   Troponin was slight elevated at previous facility but trended down on repeat. EKG did not show acute ischemic changes. There was NSR, RBBB, left anterior fascicular block that was present on previous EKG. Patient reports morphine has been helping with the pain. Pt sees nephrology for CKD 5 and there was discussion about dialysis which patient hasn't decided on yet. She able to produce urine.           Past Medical History:   Diagnosis Date    Abdominal wall hernia     CT Renal 6/11/2018---Small fat containing superior ventral abdominal wall hernia at the epicardial pacing lead site.    Abnormal mammogram 10/12/2021    Acute cystitis without hematuria 05/10/2022    Acute ischemic right middle cerebral artery (MCA) stroke 07/20/2024    GRACE (acute kidney injury) 11/22/2021    Anxiety     Aphasia 07/19/2024    Arthritis     ZEN HIPS    Breast cancer in female 08/2021    LEFT BREAST    Breast mass, right 11/13/2024    RIGHT BREAST MASS (Problem)      Bronchitis 08/18/2016    Never smoked      C. difficile colitis 11/29/2021    Cellulitis of axilla, left 12/23/2021    Chronic diastolic heart failure 12/16/2021    Chronic kidney disease     stage 4, GFR 15-29 ml/min    Chronic midline low back pain without sciatica 06/18/2018    Closed nondisplaced fracture of distal phalanx of left great toe with routine healing 10/22/2018    Coronary artery disease 1993    heart transplant    COVID-19 in immunocompromised patient 02/26/2024    Cystitis 05/10/2022    Depression     Encounter for blood transfusion     Encounter for palliative care 10/14/2024    Fibromyalgia     on Lyrica    Heart failure     native heart cardiomyopathy    Heart transplanted 1993    due to cardiomyopathy    History of hyperparathyroidism; Hyperparathyroidism, secondary renal     PT DENIES    Hypertension     Immune disorder     anti rejection meds    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    Impaired mobility 07/28/2022    Iron deficiency anemia 08/15/2017    Kidney stones     passed per pt    Obesity     Obesity (BMI 30.0-34.9) 07/22/2019    Other osteoporosis without current pathological fracture 08/30/2019    Other pancytopenia 05/06/2024    Parotitis, acute 09/19/2023    Pharyngitis 01/09/2019    Pneumonia due to infectious organism 03/14/2024    PONV (postoperative nausea and vomiting)     Severe sepsis 11/22/2021    Shingles 2003 approx    left leg    Subclinical hypothyroidism 06/16/2023    Thrombocytopenia, unspecified 11/29/2021    Trouble in sleeping     Urinary incontinence        Past Surgical History:   Procedure Laterality Date    bladder implant Right 06/11/2024    BLADDER SURGERY  2015 approx    mesh - Dr Everett then 2nd reconstructive sx Dr Onofre    BREAST BIOPSY Bilateral     NEGATIVE    BREAST BIOPSY Right 10/31/2022    benign    BREAST LUMPECTOMY Left 2021    BREAST SURGERY Left 09/28/2015    Bx - benign    BREAST SURGERY Right 12/2015    Bx benign    CARDIAC PACEMAKER REMOVAL Left 06/26/2014    Pacer defirillator removed. Put in 1993 aat time of heart transplant    CARPAL TUNNEL RELEASE Left 03/03/2015    Dr. Hall    COLONOSCOPY N/A 02/25/2021    Procedure: COLONOSCOPY;  Surgeon: Freida Ramirez MD;  Location: [facility];  Service: Endoscopy;  Laterality: N/A;    CYSTOCELE REPAIR      Twice with mesh removal    EPIDURAL STEROID INJECTION INTO CERVICAL SPINE N/A 02/02/2023    Procedure: T11/T12 IL HELLEN;  Surgeon: Jassi Pierre MD;  Location: [facility];  Service: Pain Management;  Laterality: N/A;    EPIDURAL STEROID INJECTION INTO CERVICAL SPINE N/A 9/16/2024    Procedure: T11/T12 IL HELLEN;  Surgeon: Jassi Pierre MD;  Location: [facility];  Service: Pain Management;  Laterality: N/A;    HEART TRANSPLANT  1993    HERNIA REPAIR Right 1971 approx    Inguinal    HYSTERECTOMY  1983    vag hyst /LSO     IMPLANTATION OF PERMANENT SACRAL NERVE  STIMULATOR N/A 06/11/2024    Procedure: INSERTION, NEUROSTIMULATOR, PERMANENT, SACRAL;  Surgeon: Sami Cochran MD;  Location: [facility] OR;  Service: Urology;  Laterality: N/A;    INCISION AND DRAINAGE OF ABSCESS Left 12/24/2021    Procedure: INCISION AND DRAINAGE, ABSCESS;  Surgeon: Joseph Longo MD;  Location: [facility] OR;  Service: General;  Laterality: Left;    INJECTION OF ANESTHETIC AGENT AROUND MEDIAL BRANCH NERVES INNERVATING LUMBAR FACET JOINT Right 10/19/2022    Procedure: Right L4/L5 and L5/S1 MBB;  Surgeon: Jassi Pierre MD;  Location: [facility];  Service: Pain Management;  Laterality: Right;    INJECTION OF ANESTHETIC AGENT AROUND MEDIAL BRANCH NERVES INNERVATING LUMBAR FACET JOINT Right 11/09/2022    Procedure: Right L4/L5 and L5/S1 MBB;  Surgeon: Jassi Pierre MD;  Location: [facility];  Service: Pain Management;  Laterality: Right;    INJECTION OF ANESTHETIC AGENT INTO SACROILIAC JOINT Right 08/22/2022    Procedure: Right SIJ Injection Right L5/S1 Facte Injection;  Surgeon: Jassi Pierre MD;  Location: [facility];  Service: Pain Management;  Laterality: Right;    INSERTION OF TUNNELED CENTRAL VENOUS CATHETER (CVC) WITH SUBCUTANEOUS PORT N/A 11/09/2021    Procedure: [id number];  Surgeon: Christoph Douglas MD;  Location: [facility] OR;  Service: General;  Laterality: N/A;    RADIOFREQUENCY THERMOCOAGULATION Right 12/07/2022    Procedure: Right L4/L5 and L5/S1 Lumbar RFA;  Surgeon: Jassi Pierre MD;  Location: [facility];  Service: Pain Management;  Laterality: Right;    REMOVAL OF VASCULAR ACCESS PORT      ROBOT-ASSISTED LAPAROSCOPIC ABDOMINAL SACROCOLPOPEXY N/A 08/10/2023    Procedure: ROBOTIC SACROCOLPOPEXY, ABDOMEN;  Surgeon: PRANAY Villalobos MD;  Location: [facility] OR;  Service: OB/GYN;  Laterality: N/A;    ROBOT-ASSISTED LAPAROSCOPIC OOPHORECTOMY Right 08/10/2023    Procedure: ROBOTIC OOPHORECTOMY;  Surgeon: PRANAY Villalobos MD;  Location: [facility] OR;  Service:  OB/GYN;  Laterality: Right;    SENTINEL LYMPH NODE BIOPSY Left 10/12/2021    Procedure: BIOPSY, LYMPH NODE, SENTINEL;  Surgeon: Christoph Douglas MD;  Location: [facility] OR;  Service: General;  Laterality: Left;    TOE SURGERY      XI ROBOTIC URETHROPEXY N/A 08/10/2023    Procedure: XI ROBOTIC URETHROPEXY;  Surgeon: PRANAY Villalobos MD;  Location: [facility] OR;  Service: OB/GYN;  Laterality: N/A;       Review of patient's allergies indicates:   Allergen Reactions    Dobutamine Other (See Comments)     Severe Left sided chest pain after dosage administered for Dobutamine Stress Test    Lisinopril Swelling and Rash    Nitro Shortness Of Breath     Audible wheezing - after Nitrolgycerin Spray administered x 2    Hydrocodone-acetaminophen Nausea Only    Augmentin [amoxicillin-pot clavulanate] Diarrhea    Zyvox [linezolid] Nausea And Vomiting       Current Facility-Administered Medications on File Prior to Encounter   Medication    [DISCONTINUED] acetaminophen tablet 650 mg    [DISCONTINUED] ALPRAZolam tablet 0.25 mg    [DISCONTINUED] aluminum-magnesium hydroxide-simethicone 200-200-20 mg/5 mL suspension 30 mL    [DISCONTINUED] aluminum-magnesium hydroxide-simethicone 200-200-20 mg/5 mL suspension 30 mL    [DISCONTINUED] aspirin EC tablet 81 mg    [DISCONTINUED] calcitRIOL capsule 0.25 mcg    [DISCONTINUED] calcium carbonate 200 mg calcium (500 mg) chewable tablet 1,000 mg    [DISCONTINUED] carvediloL tablet 3.125 mg    [DISCONTINUED] cycloSPORINE modified (NEORAL) (NEORAL) capsule 75 mg    [DISCONTINUED] dextrose 10% bolus 125 mL 125 mL    [DISCONTINUED] dextrose 10% bolus 250 mL 250 mL    [DISCONTINUED] diphenhydrAMINE injection 25 mg    [DISCONTINUED] famotidine IVPB 20 mg    [DISCONTINUED] glucagon (human recombinant) injection 1 mg    [DISCONTINUED] glucose chewable tablet 16 g    [DISCONTINUED] glucose chewable tablet 24 g    [DISCONTINUED] HYDROmorphone (PF) injection 1 mg    [DISCONTINUED] HYDROmorphone (PF) injection 2  mg    [DISCONTINUED] LORazepam injection 2 mg    [DISCONTINUED] naloxone 0.4 mg/mL injection 0.02 mg    [DISCONTINUED] naloxone 0.4 mg/mL injection 0.4 mg    [DISCONTINUED] ondansetron injection 4 mg    [DISCONTINUED] polyethylene glycol packet 17 g    [DISCONTINUED] pregabalin capsule 25 mg    [DISCONTINUED] prochlorperazine injection Soln 5 mg    [DISCONTINUED] racepinephrine 2.25 % nebulizer solution 0.5 mL    [DISCONTINUED] sodium bicarbonate tablet 650 mg    [DISCONTINUED] sodium chloride 0.9% flush 10 mL    [DISCONTINUED] zolpidem tablet 5 mg     Current Outpatient Medications on File Prior to Encounter   Medication Sig    aspirin (ECOTRIN) 81 MG EC tablet Take 1 tablet (81 mg total) by mouth once daily.    calcitRIOL (ROCALTROL) 0.25 MCG Cap Take 1 capsule (0.25 mcg total) by mouth once daily.    carvediloL (COREG) 3.125 MG tablet Take 1 tablet (3.125 mg total) by mouth 2 (two) times daily with meals. Hold parameters: SBP less than 110 and/or DBP less than 75    cycloSPORINE modified, NEORAL, (NEORAL) 25 MG capsule Take 3 capsules (75 mg total) by mouth 2 (two) times daily.    pregabalin (LYRICA) 25 MG capsule Take 1 capsule (25 mg total) by mouth every evening.    sodium bicarbonate 650 MG tablet Take 1 tablet (650 mg total) by mouth 2 (two) times daily.    zolpidem (AMBIEN) 5 MG Tab Take 1 tablet (5 mg total) by mouth every evening.    furosemide (LASIX) 20 MG tablet Take 2 tablets (40 mg total) by mouth once daily. HOLD UNTIL FOLLOW UP WITH PCP    ondansetron (ZOFRAN) 4 MG tablet Take 4 mg by mouth as needed for Nausea.    pantoprazole (PROTONIX) 20 MG tablet TAKE 1 TABLET ONE TIME DAILY    patiromer calcium sorbitex (VELTASSA) 8.4 gram PwPk Take 1 packet (8.4 g total) by mouth once daily    polyethylene glycol (GLYCOLAX) 17 gram PwPk Take 17 g by mouth once daily.    temazepam (RESTORIL) 30 mg capsule Take 1 capsule (30 mg total) by mouth nightly as needed for Insomnia. FOR INSOMNIA    tiZANidine 4 mg Cap  Take 2 mg by mouth nightly as needed (muscle spasm).    vitamin E 400 UNIT capsule Take 400 Units by mouth once daily.     Family History       Problem Relation (Age of Onset)    Breast cancer Mother, Maternal Grandmother    Cancer Mother (38)    Cataracts Cousin    Heart disease Maternal Grandmother    Hypertension Son          Tobacco Use    Smoking status: Never     Passive exposure: Never    Smokeless tobacco: Never   Substance and Sexual Activity    Alcohol use: Never     Alcohol/week: 0.0 standard drinks of alcohol    Drug use: No    Sexual activity: Not Currently     Partners: Male     Birth control/protection: See Surgical Hx     Review of Systems   Constitutional: Negative for chills, diaphoresis and night sweats.   Cardiovascular:  Positive for chest pain (pressure like, left sided). Negative for dyspnea on exertion, irregular heartbeat, leg swelling, near-syncope, palpitations and paroxysmal nocturnal dyspnea.   Respiratory:  Negative for cough, shortness of breath and wheezing.    Skin:  Negative for color change and dry skin.   Musculoskeletal:  Negative for joint pain and joint swelling.   Gastrointestinal:  Negative for abdominal pain, diarrhea, nausea and vomiting.   Genitourinary:  Negative for dysuria.   Neurological:  Negative for dizziness, headaches, light-headedness and weakness.   All other systems reviewed and are negative.    Objective:     Vital Signs (Most Recent):  Temp: 99 °F (37.2 °C) (11/14/24 0845)  Pulse: 84 (11/14/24 1103)  Resp: 18 (11/14/24 0856)  BP: (!) 162/97 (11/14/24 0845)  SpO2: 100 % (11/14/24 1103) Vital Signs (24h Range):  Temp:  [98 °F (36.7 °C)-99 °F (37.2 °C)] 99 °F (37.2 °C)  Pulse:  [74-94] 84  Resp:  [4-20] 18  SpO2:  [97 %-100 %] 100 %  BP: (145-167)/(72-97) 162/97     Weight: 65.9 kg (145 lb 4.5 oz)  Body mass index is 26.57 kg/m².    SpO2: 100 %         Intake/Output Summary (Last 24 hours) at 11/14/2024 1136  Last data filed at 11/14/2024 0525  Gross per 24 hour    Intake 120 ml   Output 600 ml   Net -480 ml       Lines/Drains/Airways       Drain  Duration             Female External Urinary Catheter w/ Suction 11/14/24 0357 <1 day              Peripheral Intravenous Line  Duration                  Peripheral IV - Single Lumen 11/13/24 1214 Anterior;Proximal;Right Forearm <1 day                     Physical Exam  Constitutional:       General: She is not in acute distress.     Appearance: Normal appearance.   HENT:      Head: Normocephalic.      Nose: No congestion.      Mouth/Throat:      Mouth: Mucous membranes are moist.   Cardiovascular:      Rate and Rhythm: Normal rate and regular rhythm.      Heart sounds: No murmur heard.  Pulmonary:      Effort: No respiratory distress.      Breath sounds: Normal breath sounds. No wheezing or rales.   Abdominal:      General: Bowel sounds are normal.   Musculoskeletal:         General: No tenderness.      Right lower leg: No edema.      Left lower leg: No edema.   Skin:     General: Skin is warm.      Capillary Refill: Capillary refill takes less than 2 seconds.   Neurological:      General: No focal deficit present.      Mental Status: She is alert and oriented to person, place, and time.          Significant Labs: BMP:   Recent Labs   Lab 11/12/24  1216 11/13/24  0448 11/14/24  0557   GLU 92 124* 99    141 141   K 4.2 4.9 4.2    102 105   CO2 22* 23 26   BUN 59* 59* 58*   CREATININE 4.1* 4.7* 4.1*   CALCIUM 9.3 9.4 8.6*   MG 2.1 2.1 2.2   , CBC   Recent Labs   Lab 11/12/24  1216 11/13/24  0448 11/14/24  0557   WBC 3.12* 3.19* 3.72*   HGB 10.1* 9.9* 9.9*   HCT 30.9* 31.1* 31.7*    204 190   , and Troponin   Recent Labs   Lab 11/12/24  1808 11/12/24  2153 11/13/24  0824   TROPONINI 0.055* 0.059* 0.031*       Significant Imaging: Echocardiogram: Transthoracic echo (TTE) complete (Cupid Only):   Results for orders placed or performed during the hospital encounter of 07/19/24   Echo Saline Bubble? Yes   Result Value  Ref Range    BSA 1.83 m2    LVOT stroke volume 81.51 cm3    LVIDd 4.09 3.5 - 6.0 cm    LV Systolic Volume 21.41 mL    LV Systolic Volume Index 12.0 mL/m2    LVIDs 2.46 2.1 - 4.0 cm    LV Diastolic Volume 73.65 mL    LV Diastolic Volume Index 41.38 mL/m2    Left Ventricular End Systolic Volume by Teichholz Method 21.41 mL    Left Ventricular End Diastolic Volume by Teichholz Method 73.65 mL    IVS 1.08 0.6 - 1.1 cm    LVOT diameter 2.01 cm    LVOT area 3.2 cm2    FS 40 28 - 44 %    Left Ventricle Relative Wall Thickness 0.43 cm    PW 0.87 0.6 - 1.1 cm    LV mass 127.01 g    LV Mass Index 71 g/m2    MV Peak E Jacky 0.82 m/s    TDI LATERAL 0.13 m/s    TDI SEPTAL 0.07 m/s    E/E' ratio 8.20 m/s    MV Peak A Jacky 0.72 m/s    TR Max Jacky 2.8 m/s    E/A ratio 1.14     IVRT 91.34 msec    E wave deceleration time 168.57 msec    LV SEPTAL E/E' RATIO 11.71 m/s    LV LATERAL E/E' RATIO 6.31 m/s    LVOT peak jacky 1.28 m/s    Left Ventricular Outflow Tract Mean Velocity 0.89 cm/s    Left Ventricular Outflow Tract Mean Gradient 3.61 mmHg    RVOT peak VTI 11.8 cm    TAPSE 1.56 cm    RV/LV Ratio 0.79 cm    LA size 4.06 cm    AV mean gradient 6 mmHg    AV peak gradient 10 mmHg    Ao peak jacky 1.60 m/s    Ao VTI 35.80 cm    LVOT peak VTI 25.70 cm    AV valve area 2.28 cm²    AV Velocity Ratio 0.80     AV index (prosthetic) 0.72     PRESLEY by Velocity Ratio 2.54 cm²    Triscuspid Valve Regurgitation Peak Gradient 31 mmHg    PV mean gradient 1 mmHg    PV PEAK VELOCITY 0.77 m/s    PV peak gradient 2 mmHg    Pulmonary Valve Mean Velocity 0.63 m/s    RVOT peak jacky 0.63 m/s    Ao root annulus 3.18 cm    STJ 2.98 cm    Ascending aorta 3.30 cm    Mean e' 0.10 m/s    ZLVIDS -1.68     ZLVIDD -1.85     RVDD 3.23 cm    TV resting pulmonary artery pressure 34 mmHg    RV TB RVSP 6 mmHg    Est. RA pres 3 mmHg    Narrative      Left Ventricle: The left ventricle is normal in size. Normal wall   thickness. There is normal systolic function with a visually  estimated   ejection fraction of 55 - 60%. There is normal diastolic function.    Right Ventricle: Normal right ventricular cavity size. Wall thickness   is normal. Systolic function is normal.    Left Atrium: Patent foramen ovale visualized with predominant right to   left shunting indicated by saline contrast.    Tricuspid Valve: There is mild regurgitation.    IVC/SVC: Normal venous pressure at 3 mmHg.    The patient is status post cardiac transplantation.       Assessment and Plan:     Chest pain  Patient has heart transplant Hx, HFpEF, Stroke, CKD 5. Developed chest pain and palpitation during stress but EKG part of the test was neg for ischemia. Due to persistent CP given NGT spray leading to allergic reaction. Recurrent CP episodes with initial elevation of troponin which trended down on repeat. Since patient had heart transplant and pain appears to be cardiac in nature. [place] [facility] [place]     Troponin 0.055--> 0.059--> 0.031   EKG: NSR w/PACs , RBBB, left anterior fascicular no acute ischemic changes when compared to previous EKG these findings were present  Dobutamine Stress Echo 11/12/2024: ECG Conclusion: The ECG portion of the study is negative for ischemia.  Post-stress Conclusion: The study is negative with no echocardiographic evidence of stress induced ischemia. Unable to get post stress images due to severe chest pain.   Physical exam w/o sign of volume overload.     Patient reports her pain has improved after morphine and dilaudid also helps but does not completely resolve the symptoms. NTG SL used to help prior to heart transplant (1993) but reports never needing NTG since transplant. Now allergic reaction to Nitro spray. Ideally the patient would need coronary angio but given her renal function that is out of picture.      - Recommend Cardiac PET scan    - Cardiology will continue to follow             VTE Risk Mitigation (From admission, onward)           Ordered      heparin (porcine) injection 5,000 Units  Every 8 hours         11/13/24 2322     IP VTE HIGH RISK PATIENT  Once         11/13/24 2322     Place sequential compression device  Until discontinued         11/13/24 2322                    Thank you for your consult. I will follow-up with patient. Please contact us if you have any additional questions.    Valery Lin MD  Cardiology   [facility]

## 2024-11-14 NOTE — SUBJECTIVE & OBJECTIVE
Past Medical History:   Diagnosis Date    Abdominal wall hernia     CT Renal 6/11/2018---Small fat containing superior ventral abdominal wall hernia at the epicardial pacing lead site.    Abnormal mammogram 10/12/2021    Acute cystitis without hematuria 05/10/2022    Acute ischemic right middle cerebral artery (MCA) stroke 07/20/2024    GRACE (acute kidney injury) 11/22/2021    Anxiety     Aphasia 07/19/2024    Arthritis     ZEN HIPS    Breast cancer in female 08/2021    LEFT BREAST    Bronchitis 08/18/2016    Never smoked      C. difficile colitis 11/29/2021    Cellulitis of axilla, left 12/23/2021    Chronic diastolic heart failure 12/16/2021    Chronic kidney disease     stage 4, GFR 15-29 ml/min    Chronic midline low back pain without sciatica 06/18/2018    Closed nondisplaced fracture of distal phalanx of left great toe with routine healing 10/22/2018    Coronary artery disease 1993    heart transplant    COVID-19 in immunocompromised patient 02/26/2024    Cystitis 05/10/2022    Depression     Encounter for blood transfusion     Encounter for palliative care 10/14/2024    Fibromyalgia     on Lyrica    Heart failure     native heart cardiomyopathy    Heart transplanted 1993    due to cardiomyopathy    History of hyperparathyroidism; Hyperparathyroidism, secondary renal     PT DENIES    Hypertension     Immune disorder     anti rejection meds    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    Impaired mobility 07/28/2022    Iron deficiency anemia 08/15/2017    Kidney stones     passed per pt    Obesity     Obesity (BMI 30.0-34.9) 07/22/2019    Other osteoporosis without current pathological fracture 08/30/2019    Other pancytopenia 05/06/2024    Parotitis, acute 09/19/2023    Pharyngitis 01/09/2019    Pneumonia due to infectious organism 03/14/2024    PONV (postoperative nausea and vomiting)     Severe sepsis 11/22/2021    Shingles 2003 approx    left leg    Subclinical hypothyroidism 06/16/2023     Thrombocytopenia, unspecified 11/29/2021    Trouble in sleeping     Urinary incontinence        Past Surgical History:   Procedure Laterality Date    bladder implant Right 06/11/2024    BLADDER SURGERY  2015 approx    mesh - Dr Everett then 2nd reconstructive sx Dr Onofre    BREAST BIOPSY Bilateral     NEGATIVE    BREAST BIOPSY Right 10/31/2022    benign    BREAST LUMPECTOMY Left 2021    BREAST SURGERY Left 09/28/2015    Bx - benign    BREAST SURGERY Right 12/2015    Bx benign    CARDIAC PACEMAKER REMOVAL Left 06/26/2014    Pacer defirillator removed. Put in 1993 aat time of heart transplant    CARPAL TUNNEL RELEASE Left 03/03/2015    Dr. Hall    COLONOSCOPY N/A 02/25/2021    Procedure: COLONOSCOPY;  Surgeon: Freida Ramirez MD;  Location: [facility] ENDO;  Service: Endoscopy;  Laterality: N/A;    CYSTOCELE REPAIR      Twice with mesh removal    EPIDURAL STEROID INJECTION INTO CERVICAL SPINE N/A 02/02/2023    Procedure: T11/T12 IL HELLEN;  Surgeon: Jassi Pierre MD;  Location: [facility] PAIN MGT;  Service: Pain Management;  Laterality: N/A;    EPIDURAL STEROID INJECTION INTO CERVICAL SPINE N/A 9/16/2024    Procedure: T11/T12 IL HELLEN;  Surgeon: Jassi Pierre MD;  Location: [facility] PAIN MGT;  Service: Pain Management;  Laterality: N/A;    HEART TRANSPLANT  1993    HERNIA REPAIR Right 1971 approx    Inguinal    HYSTERECTOMY  1983    vag hyst /LSO     IMPLANTATION OF PERMANENT SACRAL NERVE STIMULATOR N/A 06/11/2024    Procedure: INSERTION, NEUROSTIMULATOR, PERMANENT, SACRAL;  Surgeon: Sami Cochran MD;  Location: [facility] OR;  Service: Urology;  Laterality: N/A;    INCISION AND DRAINAGE OF ABSCESS Left 12/24/2021    Procedure: INCISION AND DRAINAGE, ABSCESS;  Surgeon: Joseph Longo MD;  Location: [facility] OR;  Service: General;  Laterality: Left;    INJECTION OF ANESTHETIC AGENT AROUND MEDIAL BRANCH NERVES INNERVATING LUMBAR FACET JOINT Right 10/19/2022    Procedure: Right L4/L5 and L5/S1 MBB;  Surgeon: Jassi  MD Ignacio;  Location: [facility] PAIN MGT;  Service: Pain Management;  Laterality: Right;    INJECTION OF ANESTHETIC AGENT AROUND MEDIAL BRANCH NERVES INNERVATING LUMBAR FACET JOINT Right 11/09/2022    Procedure: Right L4/L5 and L5/S1 MBB;  Surgeon: Jassi Pierre MD;  Location: [facility] PAIN MGT;  Service: Pain Management;  Laterality: Right;    INJECTION OF ANESTHETIC AGENT INTO SACROILIAC JOINT Right 08/22/2022    Procedure: Right SIJ Injection Right L5/S1 Facte Injection;  Surgeon: Jassi Pierre MD;  Location: [facility] PAIN MGT;  Service: Pain Management;  Laterality: Right;    INSERTION OF TUNNELED CENTRAL VENOUS CATHETER (CVC) WITH SUBCUTANEOUS PORT N/A 11/09/2021    Procedure: [id number];  Surgeon: Christoph Douglas MD;  Location: [facility] OR;  Service: General;  Laterality: N/A;    RADIOFREQUENCY THERMOCOAGULATION Right 12/07/2022    Procedure: Right L4/L5 and L5/S1 Lumbar RFA;  Surgeon: Jassi Pierre MD;  Location: [facility] PAIN MGT;  Service: Pain Management;  Laterality: Right;    REMOVAL OF VASCULAR ACCESS PORT      ROBOT-ASSISTED LAPAROSCOPIC ABDOMINAL SACROCOLPOPEXY N/A 08/10/2023    Procedure: ROBOTIC SACROCOLPOPEXY, ABDOMEN;  Surgeon: PRANAY Villalobos MD;  Location: [facility] OR;  Service: OB/GYN;  Laterality: N/A;    ROBOT-ASSISTED LAPAROSCOPIC OOPHORECTOMY Right 08/10/2023    Procedure: ROBOTIC OOPHORECTOMY;  Surgeon: PRANAY Villalobos MD;  Location: [facility] OR;  Service: OB/GYN;  Laterality: Right;    SENTINEL LYMPH NODE BIOPSY Left 10/12/2021    Procedure: BIOPSY, LYMPH NODE, SENTINEL;  Surgeon: Christoph Douglas MD;  Location: [facility] OR;  Service: General;  Laterality: Left;    TOE SURGERY      XI ROBOTIC URETHROPEXY N/A 08/10/2023    Procedure: XI ROBOTIC URETHROPEXY;  Surgeon: PRANAY Villalobos MD;  Location: [facility] OR;  Service: OB/GYN;  Laterality: N/A;       Review of patient's allergies indicates:   Allergen Reactions    Lisinopril Swelling and Rash    Nitro Shortness Of Breath     Audible  wheezing     Hydrocodone-acetaminophen Nausea Only    Augmentin [amoxicillin-pot clavulanate] Diarrhea    Zyvox [linezolid] Nausea And Vomiting       Current Facility-Administered Medications on File Prior to Encounter   Medication    [COMPLETED] enoxaparin injection 70 mg    [COMPLETED] HYDROmorphone (PF) injection 1 mg    [COMPLETED] LIDOcaine viscous HCl 2% oral solution 15 mL    [COMPLETED] LORazepam injection 2 mg    [DISCONTINUED] acetaminophen tablet 650 mg    [DISCONTINUED] ALPRAZolam tablet 0.25 mg    [DISCONTINUED] aluminum-magnesium hydroxide-simethicone 200-200-20 mg/5 mL suspension 30 mL    [DISCONTINUED] aluminum-magnesium hydroxide-simethicone 200-200-20 mg/5 mL suspension 30 mL    [DISCONTINUED] aspirin EC tablet 81 mg    [DISCONTINUED] calcitRIOL capsule 0.25 mcg    [DISCONTINUED] calcium carbonate 200 mg calcium (500 mg) chewable tablet 1,000 mg    [DISCONTINUED] carvediloL tablet 3.125 mg    [DISCONTINUED] cycloSPORINE modified (NEORAL) (NEORAL) capsule 75 mg    [DISCONTINUED] dextrose 10% bolus 125 mL 125 mL    [DISCONTINUED] dextrose 10% bolus 250 mL 250 mL    [DISCONTINUED] diphenhydrAMINE injection 25 mg    [DISCONTINUED] enoxaparin injection 30 mg    [DISCONTINUED] famotidine IVPB 20 mg    [DISCONTINUED] glucagon (human recombinant) injection 1 mg    [DISCONTINUED] glucose chewable tablet 16 g    [DISCONTINUED] glucose chewable tablet 24 g    [DISCONTINUED] HYDROmorphone (PF) injection 1 mg    [DISCONTINUED] HYDROmorphone (PF) injection 2 mg    [DISCONTINUED] LORazepam injection 2 mg    [DISCONTINUED] LORazepam injection 2 mg    [DISCONTINUED] morphine injection 4 mg    [DISCONTINUED] naloxone 0.4 mg/mL injection 0.02 mg    [DISCONTINUED] naloxone 0.4 mg/mL injection 0.4 mg    [DISCONTINUED] ondansetron injection 4 mg    [DISCONTINUED] polyethylene glycol packet 17 g    [DISCONTINUED] pregabalin capsule 25 mg    [DISCONTINUED] prochlorperazine injection Soln 5 mg    [DISCONTINUED]  racepinephrine 2.25 % nebulizer solution 0.5 mL    [DISCONTINUED] sodium bicarbonate tablet 650 mg    [DISCONTINUED] sodium chloride 0.9% flush 10 mL    [DISCONTINUED] zolpidem tablet 5 mg     Current Outpatient Medications on File Prior to Encounter   Medication Sig    aspirin (ECOTRIN) 81 MG EC tablet Take 1 tablet (81 mg total) by mouth once daily.    calcitRIOL (ROCALTROL) 0.25 MCG Cap Take 1 capsule (0.25 mcg total) by mouth once daily.    carvediloL (COREG) 3.125 MG tablet Take 1 tablet (3.125 mg total) by mouth 2 (two) times daily with meals. Hold parameters: SBP less than 110 and/or DBP less than 75    cycloSPORINE modified, NEORAL, (NEORAL) 25 MG capsule Take 3 capsules (75 mg total) by mouth 2 (two) times daily.    furosemide (LASIX) 20 MG tablet Take 2 tablets (40 mg total) by mouth once daily. HOLD UNTIL FOLLOW UP WITH PCP    ondansetron (ZOFRAN) 4 MG tablet Take 4 mg by mouth as needed for Nausea.    pantoprazole (PROTONIX) 20 MG tablet TAKE 1 TABLET ONE TIME DAILY    patiromer calcium sorbitex (VELTASSA) 8.4 gram PwPk Take 1 packet (8.4 g total) by mouth once daily    polyethylene glycol (GLYCOLAX) 17 gram PwPk Take 17 g by mouth once daily.    pregabalin (LYRICA) 25 MG capsule Take 1 capsule (25 mg total) by mouth every evening.    sodium bicarbonate 650 MG tablet Take 1 tablet (650 mg total) by mouth 2 (two) times daily.    temazepam (RESTORIL) 30 mg capsule Take 1 capsule (30 mg total) by mouth nightly as needed for Insomnia. FOR INSOMNIA    tiZANidine 4 mg Cap Take 2 mg by mouth nightly as needed (muscle spasm).    vitamin E 400 UNIT capsule Take 400 Units by mouth once daily.    zolpidem (AMBIEN) 5 MG Tab Take 1 tablet (5 mg total) by mouth every evening.     Family History       Problem Relation (Age of Onset)    Breast cancer Mother, Maternal Grandmother    Cancer Mother (38)    Cataracts Cousin    Heart disease Maternal Grandmother    Hypertension Son          Tobacco Use    Smoking status:  Never     Passive exposure: Never    Smokeless tobacco: Never   Substance and Sexual Activity    Alcohol use: Never     Alcohol/week: 0.0 standard drinks of alcohol    Drug use: No    Sexual activity: Not Currently     Partners: Male     Birth control/protection: See Surgical Hx     Review of Systems   Constitutional:  Negative for activity change and fatigue.   HENT:  Negative for trouble swallowing (reports wearing dentures and prefers soft foods).    Respiratory:  Negative for chest tightness, shortness of breath and wheezing.    Cardiovascular:  Negative for chest pain and palpitations.   Genitourinary:  Negative for difficulty urinating and dysuria.   Neurological:  Positive for weakness.   Psychiatric/Behavioral:  The patient is nervous/anxious.      Objective:     Vital Signs (Most Recent):  Temp: 98.1 °F (36.7 °C) (11/13/24 2247)  Pulse: 94 (11/13/24 2247)  Resp: 17 (11/13/24 2247)  BP: (!) 165/72 (11/13/24 2247)  SpO2: 100 % (11/13/24 2247) Vital Signs (24h Range):  Temp:  [98 °F (36.7 °C)-98.9 °F (37.2 °C)] 98.1 °F (36.7 °C)  Pulse:  [74-95] 94  Resp:  [4-20] 17  SpO2:  [97 %-100 %] 100 %  BP: (142-167)/(72-93) 165/72     Weight: 65.9 kg (145 lb 4.5 oz)  Body mass index is 26.57 kg/m².     Physical Exam  Vitals and nursing note reviewed.   Constitutional:       General: She is not in acute distress.     Appearance: She is overweight. She is ill-appearing.      Interventions: Nasal cannula in place.   HENT:      Right Ear: Hearing normal.      Left Ear: Hearing normal.      Nose: No rhinorrhea.      Right Sinus: No maxillary sinus tenderness or frontal sinus tenderness.      Left Sinus: No maxillary sinus tenderness or frontal sinus tenderness.      Mouth/Throat:      Mouth: Mucous membranes are moist. No oral lesions.      Pharynx: Uvula midline.   Eyes:      Pupils: Pupils are equal, round, and reactive to light.   Neck:      Comments: On auscultation of neck if patient leans back there is upper airway  "bronchophony that resolves if she leans forward,  I do not think true stridor, suspect patient augmented  Cardiovascular:      Rate and Rhythm: Normal rate and regular rhythm.      Pulses:           Dorsalis pedis pulses are 2+ on the right side and 2+ on the left side.      Heart sounds: S1 normal and S2 normal. Murmur heard.   Pulmonary:      Effort: Pulmonary effort is normal. Tachypnea present. No respiratory distress.      Breath sounds: Normal breath sounds. No wheezing.   Abdominal:      General: Bowel sounds are normal. There is no distension.      Palpations: Abdomen is soft. There is no mass.      Tenderness: There is no abdominal tenderness.      Hernia: A hernia (ventral) is present.   Musculoskeletal:         General: Normal range of motion.      Right lower leg: No edema.      Left lower leg: No edema.   Lymphadenopathy:      Upper Body:      Right upper body: No supraclavicular adenopathy.      Left upper body: No supraclavicular adenopathy.   Skin:     General: Skin is warm and dry.      Capillary Refill: Capillary refill takes less than 2 seconds.      Findings: No rash.   Neurological:      Mental Status: She is alert and oriented to person, place, and time.   Psychiatric:         Mood and Affect: Mood is anxious. Mood is not depressed.         Speech: Speech is not rapid and pressured.         Behavior: Behavior is not hyperactive. Behavior is cooperative.         Thought Content: Thought content normal.         Judgment: Judgment normal.              CRANIAL NERVES     CN III, IV, VI   Pupils are equal, round, and reactive to light.       Significant Labs: All pertinent labs within the past 24 hours have been reviewed.  ABGs: No results for input(s): "PH", "PCO2", "HCO3", "POCSATURATED", "BE", "TOTALHB", "COHB", "METHB", "O2HB", "POCFIO2", "PO2" in the last 48 hours.  CBC:   Recent Labs   Lab 11/12/24  1216 11/13/24  0448   WBC 3.12* 3.19*   HGB 10.1* 9.9*   HCT 30.9* 31.1*    204     CMP: " "  Recent Labs   Lab 11/12/24  1216 11/13/24  0448    141   K 4.2 4.9    102   CO2 22* 23   GLU 92 124*   BUN 59* 59*   CREATININE 4.1* 4.7*   CALCIUM 9.3 9.4   PROT 8.3 7.8   ALBUMIN 3.8 3.5   BILITOT 0.5 0.4   ALKPHOS 140 107   AST 50* 40   ALT 32 30   ANIONGAP 14 16     Cardiac Markers:   Recent Labs   Lab 11/12/24  1216   *     Coagulation:   Recent Labs   Lab 11/12/24  1216   INR 1.0   APTT 28.3     Lactic Acid: No results for input(s): "LACTATE" in the last 48 hours.  Magnesium:   Recent Labs   Lab 11/12/24  1216 11/13/24  0448   MG 2.1 2.1     Troponin:   Recent Labs   Lab 11/12/24  1808 11/12/24  2153 11/13/24  0824   TROPONINI 0.055* 0.059* 0.031*     TSH:   Recent Labs   Lab 11/12/24  1216   TSH 10.030*     Urine Studies: No results for input(s): "COLORU", "APPEARANCEUA", "PHUR", "SPECGRAV", "PROTEINUA", "GLUCUA", "KETONESU", "BILIRUBINUA", "OCCULTUA", "NITRITE", "UROBILINOGEN", "LEUKOCYTESUR", "RBCUA", "WBCUA", "BACTERIA", "SQUAMEPITHEL", "HYALINECASTS" in the last 48 hours.    Invalid input(s): "WRIGHTSUR"    Significant Imaging: I have reviewed all pertinent imaging results/findings within the past 24 hours.  CT HEAD WITHOUT CONTRAST     CLINICAL HISTORY:  Seizure, new-onset, no history of trauma;     TECHNIQUE:  Noncontrast images were obtained     COMPARISON:  CT brain 07/25/2024.     FINDINGS:  No intracranial acute hemorrhage or acute focal brain parenchymal abnormality is identified.  Calvarium is intact.     Impression:     No acute intracranial abnormality.     All CT scans at this facility use dose modulation, iterative reconstructions, and/or weight base dosing when appropriate to reduce radiation dose to as low as reasonably achievable.        Electronically signed by: Jackie Reyes MD  Date:                                            11/12/2024  Time:                                           13:02        Exam Ended: 11/12/24 12:38 CST       "

## 2024-11-14 NOTE — HPI
"70 y/o AAF s/p Heart Transplant (1993), HFpEF, Breast Cancer, CKD Stage 4, Stroke (July 2024) hx of C diff admitted to [facility] on November 12 with chest pain after a dobutamine stress test as an outpatient.     11/12 Morning - She had outpatient stress echocardiogram, receive the normal dose of dobutamine for the test thought reports that she started experiencing aching in her left chest as soon as infusion was started, she ultimately completed the stress echo but immediately after had severe 10/10 left chest pain, in the cardiac stress lab she was administered 2 sprays of nitroglycerin, which she states after receiving she developed severe shortness of breath and in notes is reported to have audible wheezing.  She was also given 325 mg chewable aspirin, EMS was called and she was transported to [facility] emergency department.      On arrival to the emergency department and there was concern she had active respiratory distress, audible wheezing and possibly stridor, she was given racemic epinephrine nebulizer, IV Solu-Medrol and IV famotidine and IV Ativan, emergency room documentation indicates after this her stridor and wheezing fully resolved, work of breathing returned to normal and patient was speaking in full sentences without distress.  Her case was discussed with Hospital Medicine, Cardiology and Dr. Suly silva/ transplant cardiology and decision made to admit her at [facility] for observation evaluation of adverse drug reaction to dobutamine and nitroglycerin spray.      Evening of 11/12 21:00 rapid response called after she had been admitted for concerns of possible seizure-like activity with diffuse convulsions per documentation "Patient nonverbal during episode which lasted around 2 minutes. Toward end of event patient grabbed one of providers hands and immediately began speaking. She was not post-ictal. Patient continued to shake but was awake and oriented x 4 without " "neurological deficits. Oxygen saturation remain 100% through event. -120's. 1mg Ativan IV given with gradual relief in symptoms. She reports increased physical symptoms related to increase pain or anxiety. Prior to event nurse reports patient complained of dyspnea while checking vitals. HR improved to 80's and jerking movements are minimal. "    During her stay she underwent Neurology tele-consultation with an assessment of seizure-like activity, suspected non epileptiform.  No AED was indicated at present.  Recommendations included EEG.  Currently she is awake and alert with no new neurologic deficits.     On the morning of 11/13, she had another episode of chest pain with left arm numbness, diaphoresis, tachycardia, and tachypnea.  Repeat troponin was 0.031 which was lower than the values from the night prior.  She received (08:20) Morphine 4mg IV, 2mg IV Ativan, PO Maalox without relief.  Cardiology contacted and patient was given Therapeutic loading dose of lovenox 70mg/kg, 1mg IV dilaudid (09:20), and Gi cocktail.  After this event Cardiology recommended transfer to [facility] for higher level of care. Repeat EKG at this time showed Sinus tachycardia w/ PACs     Subsequently @ 11:12 AM she had repeat chest pain 10/10 reported, on-call hospital med notified and patient was given Dilaudid 2mg IV + 2mg IV ativan at same time and at 12:06 patient noted to have severe respiratory depression - RR 2-4/min and emergently Narcan 0.4mg IV given (12:10) to patient, which reportedly brought pt back to baseline but also resulted in her crying due to recurrence of chest pain.     BEDSIDE EVAL  -Tonight patient seen on medical hansen - she is sleeping but awakens easily.  RN on floor noted that patient complaint of chest pain on arrival, I had continued medications from previous admission on her arrival and pt given dilaudid 1mg and subsequently bendaryl IV for c/o of itching.    Patient reports overall her chest discomfort is " much better than inciting event on 11/12.    She reports suffering a stroke on her [birth year] year, required Inpatient rehab and home health - has moved from using wheelchair --> walker -- > now using cane.  She lives in an assisted living apartment facility.   On review of home medications - reports being on both Temazepam + Zolpidem for many decades due to chronic insomnia.   Patient is tangential in discussion of her symptoms, mentioning visits issues that have occurred in past, she does mention repeatedly that in addition to recent stroke, she is having progressive CKD - now stage 4.  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  Nephrolgist has discussed initiating/prepping her hemodialysis but patient has not committed to this yet.

## 2024-11-14 NOTE — ASSESSMENT & PLAN NOTE
Based on interview and chart review tonight, suspect that patient had 2 reactions that contributed to her ED presentation.  She describes as soon as dobutamine was infusing that she started experiencing chest aching, she states she told staff.  Stress ECHO report indicates full dosage given and stress imaging completed, and patient's complaints were after test finished.     Her wheezing/respiratory distress and stridor - she attributes to the Nitroglycerin spray, she reports 1st spray went sublingual but 2nd spray adminstered went toward her pharynx and caused her immediate discomfort/distress, improper administration may be the cause.     Have added both to allergy profile.

## 2024-11-14 NOTE — ASSESSMENT & PLAN NOTE
-Seems to be occurring less frequently   -nitroglycerin/dobutamine added to allergy/intolerance list     -as above lowered dosage of PRN pain medications going forward and would look to wean off.   -General cardiology consult in AM   -obtain ECHO to ensure no acute changes/evolution since stress echo completed      DO NOT give Ativan+ Opiate for chest pain complaints.

## 2024-11-14 NOTE — NURSING
Pt arrived from OSH. VSS. Pt is on 2L NC. Pt with c/o chest pain. PRN dilaudid and maalox given. Hosp med P secured messaged when pt arrived. No skin break down noted. Pt called her son to inform him of her arrival to the hospital. Pt oriented to room. Seizure precautions in place. Will continue to monitor.

## 2024-11-14 NOTE — PROCEDURES
Date of service  11/14/24    Introduction  Electroencephalographic (EEG) is recorded with electrodes placed according to the International 10-20 placement system.  Thirty two (32) channels  of digital signal (sampling rate of 512/sec), including T1 and T2, were simultaneously recorded from the scalp and may include EKG, EMG, and/or eye monitors.  Recording band pass was 0.1 to 512 Hz.  Digital video recording of the patient is simultaneously recorded with the EEG.  The patient is instructed to report clinical symptoms which may occur during the recording session.  EEG and video recording are stored and archived in digital format.  Activation procedures, which include photic stimulation, hyperventilation and instructing patients to perform simple tasks, are done in selected patients.    The EEG is displayed on a monitor screen and can be reviewed using different montages.  Computer-assisted analysis is employed to detect spike and electrographic seizure activity.   The entire record is submitted for computer analysis.  The entire recording is visually reviewed, and the times identified by computer analysis as being spikes or seizures are reviewed again.      Compressed spectral analysis (CSA) is also performed on the activity recorded from each individual channel.  This is displayed as a power display of frequencies from 0 to 30 Hz over time.   The CSA is reviewed looking for asymmetries in power between homologous areas of the scalp, then compared with the original EEG recording.      Sure[employer]ak software was also utilized in the review of this study. This software suite analyzes the EEG recording in multiple domains. Coherence and rhythmicity are computed to identify EEG sections which may contain organized seizures. Each channel undergoes analysis to detect the presence of spike and sharp waves which have special and morphological characteristics of epileptic activity. The routine EEG recording is converted from  special into frequency domain. This is then displayed comparing homologous areas to identify areas of significant asymmetry. Algorithm to identify non-cortically generated artifact is used to separate artifact from the EEG.    Recording Times  11/14/24 08:59 - 10:20  A total of 1 hour and 19 minutes of EEG/video telemetry was recorded.    Findings  The patient's background consists of a posteriorly dominant 12 Hz alpha rhythm.  Anteriorly, the patient's background consists of predominantly beta range frequencies.    During the course of the recording, the patient is noted to be awake, and subsequently becomes drowsy.  Ultimately, stage II sleep is appreciated.    Hyperventilation and photic stimulation were not performed.     There is no focal slowing.  There are no focal, lateralized, or epileptiform transients.  No electrographic seizures are seen.    EKG demonstrates regular rhythm with occasional premature beat.    Interpretation  This is a normal LTM-EEG study demonstrating the awake, drowsy, and asleep states.  This recording provides no evidence of epilepsy.  Please be aware that a normal video EEG monitoring study that does not capture the events of interest cannot fully exclude the possibility of an underlying seizure disorder.

## 2024-11-14 NOTE — ASSESSMENT & PLAN NOTE
-resume home anti-rejection medication Cylosporine - check serum level with Am labs   -HTS cardiology consult in AM - contact on-call service to notify of transfer/consult

## 2024-11-15 ENCOUNTER — TELEPHONE (OUTPATIENT)
Dept: TRANSPLANT | Facility: CLINIC | Age: 69
End: 2024-11-15
Payer: MEDICARE

## 2024-11-15 ENCOUNTER — TELEPHONE (OUTPATIENT)
Dept: PAIN MEDICINE | Facility: CLINIC | Age: 69
End: 2024-11-15
Payer: MEDICARE

## 2024-11-15 VITALS
SYSTOLIC BLOOD PRESSURE: 154 MMHG | WEIGHT: 141 LBS | OXYGEN SATURATION: 97 % | DIASTOLIC BLOOD PRESSURE: 84 MMHG | HEART RATE: 95 BPM | RESPIRATION RATE: 18 BRPM | BODY MASS INDEX: 26.62 KG/M2 | HEIGHT: 61 IN | TEMPERATURE: 98 F

## 2024-11-15 LAB
ALBUMIN SERPL BCP-MCNC: 3.1 G/DL (ref 3.5–5.2)
ALP SERPL-CCNC: 91 U/L (ref 40–150)
ALT SERPL W/O P-5'-P-CCNC: 20 U/L (ref 10–44)
ANION GAP SERPL CALC-SCNC: 9 MMOL/L (ref 8–16)
AST SERPL-CCNC: 30 U/L (ref 10–40)
BASOPHILS # BLD AUTO: 0.01 K/UL (ref 0–0.2)
BASOPHILS NFR BLD: 0.3 % (ref 0–1.9)
BILIRUB SERPL-MCNC: 0.5 MG/DL (ref 0.1–1)
BUN SERPL-MCNC: 58 MG/DL (ref 8–23)
CALCIUM SERPL-MCNC: 8.4 MG/DL (ref 8.7–10.5)
CFR FLOW - ANTERIOR: 1.63
CFR FLOW - INFERIOR: 1.89
CFR FLOW - LATERAL: 1.69
CFR FLOW - MAX: 2.25
CFR FLOW - MIN: 1.34
CFR FLOW - SEPTAL: 1.85
CFR FLOW - WHOLE HEART: 1.77
CHLORIDE SERPL-SCNC: 105 MMOL/L (ref 95–110)
CO2 SERPL-SCNC: 26 MMOL/L (ref 23–29)
CREAT SERPL-MCNC: 3.9 MG/DL (ref 0.5–1.4)
CV STRESS BASE HR: 84 BPM
CYCLOSPORINE BLD LC/MS/MS-MCNC: 65 NG/ML (ref 100–400)
DIASTOLIC BLOOD PRESSURE: 97 MMHG
DIFFERENTIAL METHOD BLD: ABNORMAL
EJECTION FRACTION- HIGH: 59 %
END DIASTOLIC INDEX-HIGH: 155 ML/M2
END DIASTOLIC INDEX-LOW: 91 ML/M2
END SYSTOLIC INDEX-HIGH: 78 ML/M2
END SYSTOLIC INDEX-LOW: 40 ML/M2
EOSINOPHIL # BLD AUTO: 0.2 K/UL (ref 0–0.5)
EOSINOPHIL NFR BLD: 6.8 % (ref 0–8)
ERYTHROCYTE [DISTWIDTH] IN BLOOD BY AUTOMATED COUNT: 15.3 % (ref 11.5–14.5)
EST. GFR  (NO RACE VARIABLE): 11.8 ML/MIN/1.73 M^2
GLUCOSE SERPL-MCNC: 76 MG/DL (ref 70–110)
HCT VFR BLD AUTO: 29.8 % (ref 37–48.5)
HGB BLD-MCNC: 9.3 G/DL (ref 12–16)
IMM GRANULOCYTES # BLD AUTO: 0.01 K/UL (ref 0–0.04)
IMM GRANULOCYTES NFR BLD AUTO: 0.3 % (ref 0–0.5)
LYMPHOCYTES # BLD AUTO: 1.3 K/UL (ref 1–4.8)
LYMPHOCYTES NFR BLD: 36.1 % (ref 18–48)
MAGNESIUM SERPL-MCNC: 2 MG/DL (ref 1.6–2.6)
MCH RBC QN AUTO: 28.8 PG (ref 27–31)
MCHC RBC AUTO-ENTMCNC: 31.2 G/DL (ref 32–36)
MCV RBC AUTO: 92 FL (ref 82–98)
MONOCYTES # BLD AUTO: 0.5 K/UL (ref 0.3–1)
MONOCYTES NFR BLD: 15.1 % (ref 4–15)
NEUTROPHILS # BLD AUTO: 1.5 K/UL (ref 1.8–7.7)
NEUTROPHILS NFR BLD: 41.4 % (ref 38–73)
NRBC BLD-RTO: 0 /100 WBC
NUC REST DIASTOLIC VOLUME INDEX: 64
NUC REST EJECTION FRACTION: 56
NUC REST SYSTOLIC VOLUME INDEX: 28
NUC STRESS DIASTOLIC VOLUME INDEX: 66
NUC STRESS EJECTION FRACTION: 59 %
NUC STRESS SYSTOLIC VOLUME INDEX: 27
OHS CV CPX 1 MINUTE RECOVERY HEART RATE: 90 BPM
OHS CV CPX 85 PERCENT MAX PREDICTED HEART RATE MALE: 128
OHS CV CPX MAX PREDICTED HEART RATE: 150
OHS CV CPX PATIENT IS FEMALE: 1
OHS CV CPX PATIENT IS MALE: 0
OHS CV CPX PEAK DIASTOLIC BLOOD PRESSURE: 60 MMHG
OHS CV CPX PEAK HEAR RATE: 75 BPM
OHS CV CPX PEAK RATE PRESSURE PRODUCT: 9375
OHS CV CPX PEAK SYSTOLIC BLOOD PRESSURE: 125 MMHG
OHS CV CPX PERCENT MAX PREDICTED HEART RATE ACHIEVED: 52
OHS CV CPX RATE PRESSURE PRODUCT PRESENTING: NORMAL
OHS CV INITIAL DOSE: 20.1 MCG/KG/MIN
OHS CV MODERATELY REDUCED FLOW CAPACITY: 0 %
OHS CV NO ISCHEMIA MILDLY REDUCED FLOW CAPACTY: 48 %
OHS CV NO ISCHEMIA MINIMALLY REDUCED FLOW CAPACITY: 34 %
OHS CV NORMAL FLOW CAPACITY COMPARABLE TO HEALTHY YOUNG VOLUNTEERS: 18 %
OHS CV PEAK DOSE: 20.1 MCG/KG/MIN
OHS CV PET ID: 7626
OHS CV SEVERELY REDUCED FLOW CAPACITY: 0 %
OHS CV TOTAL EXAM DLP: 299.9 MGY-CM
PHOSPHATE SERPL-MCNC: 3.7 MG/DL (ref 2.7–4.5)
PLATELET # BLD AUTO: 175 K/UL (ref 150–450)
PMV BLD AUTO: 10.1 FL (ref 9.2–12.9)
POTASSIUM SERPL-SCNC: 4 MMOL/L (ref 3.5–5.1)
PROT SERPL-MCNC: 6.7 G/DL (ref 6–8.4)
RBC # BLD AUTO: 3.23 M/UL (ref 4–5.4)
REST FLOW - ANTERIOR: 1.01 CC/MIN/G
REST FLOW - INFERIOR: 1.06 CC/MIN/G
REST FLOW - LATERAL: 1.21 CC/MIN/G
REST FLOW - MAX: 1.43 CC/MIN/G
REST FLOW - MIN: 0.7 CC/MIN/G
REST FLOW - SEPTAL: 1.09 CC/MIN/G
REST FLOW - WHOLE HEART: 1.09 CC/MIN/G
RETIRED EF AND QEF - SEE NOTES: 47 %
SODIUM SERPL-SCNC: 140 MMOL/L (ref 136–145)
STRESS FLOW - ANTERIOR: 1.64 CC/MIN/G
STRESS FLOW - INFERIOR: 1.98 CC/MIN/G
STRESS FLOW - LATERAL: 2.04 CC/MIN/G
STRESS FLOW - MAX: 2.41 CC/MIN/G
STRESS FLOW - MIN: 1.14 CC/MIN/G
STRESS FLOW - SEPTAL: 2.01 CC/MIN/G
STRESS FLOW - WHOLE HEART: 1.92 CC/MIN/G
SYSTOLIC BLOOD PRESSURE: 173 MMHG
WBC # BLD AUTO: 3.52 K/UL (ref 3.9–12.7)

## 2024-11-15 PROCEDURE — 80158 DRUG ASSAY CYCLOSPORINE: CPT | Mod: HCNC | Performed by: HOSPITALIST

## 2024-11-15 PROCEDURE — 85025 COMPLETE CBC W/AUTO DIFF WBC: CPT | Mod: HCNC | Performed by: HOSPITALIST

## 2024-11-15 PROCEDURE — 25000003 PHARM REV CODE 250: Mod: HCNC | Performed by: FAMILY MEDICINE

## 2024-11-15 PROCEDURE — A9555 RB82 RUBIDIUM: HCPCS | Mod: HCNC | Performed by: STUDENT IN AN ORGANIZED HEALTH CARE EDUCATION/TRAINING PROGRAM

## 2024-11-15 PROCEDURE — 63600175 PHARM REV CODE 636 W HCPCS: Mod: HCNC | Performed by: HOSPITALIST

## 2024-11-15 PROCEDURE — 80053 COMPREHEN METABOLIC PANEL: CPT | Mod: HCNC | Performed by: HOSPITALIST

## 2024-11-15 PROCEDURE — 96372 THER/PROPH/DIAG INJ SC/IM: CPT | Performed by: HOSPITALIST

## 2024-11-15 PROCEDURE — G0378 HOSPITAL OBSERVATION PER HR: HCPCS | Mod: HCNC

## 2024-11-15 PROCEDURE — 83735 ASSAY OF MAGNESIUM: CPT | Mod: HCNC | Performed by: HOSPITALIST

## 2024-11-15 PROCEDURE — 63600175 PHARM REV CODE 636 W HCPCS: Mod: HCNC | Performed by: STUDENT IN AN ORGANIZED HEALTH CARE EDUCATION/TRAINING PROGRAM

## 2024-11-15 PROCEDURE — 25000003 PHARM REV CODE 250: Mod: HCNC | Performed by: HOSPITALIST

## 2024-11-15 PROCEDURE — 36415 COLL VENOUS BLD VENIPUNCTURE: CPT | Mod: HCNC | Performed by: HOSPITALIST

## 2024-11-15 PROCEDURE — 84100 ASSAY OF PHOSPHORUS: CPT | Mod: HCNC | Performed by: HOSPITALIST

## 2024-11-15 RX ORDER — AMINOPHYLLINE 25 MG/ML
75 INJECTION, SOLUTION INTRAVENOUS ONCE
Status: COMPLETED | OUTPATIENT
Start: 2024-11-15 | End: 2024-11-15

## 2024-11-15 RX ORDER — REGADENOSON 0.08 MG/ML
0.4 INJECTION, SOLUTION INTRAVENOUS ONCE
Status: COMPLETED | OUTPATIENT
Start: 2024-11-15 | End: 2024-11-15

## 2024-11-15 RX ADMIN — RUBIDIUM CHLORIDE RB-82 20.1 MILLICURIE: 150 INJECTION, SOLUTION INTRAVENOUS at 07:11

## 2024-11-15 RX ADMIN — REGADENOSON 0.4 MG: 0.08 INJECTION, SOLUTION INTRAVENOUS at 07:11

## 2024-11-15 RX ADMIN — SODIUM BICARBONATE 650 MG TABLET 650 MG: at 08:11

## 2024-11-15 RX ADMIN — AMINOPHYLLINE 75 MG: 25 INJECTION, SOLUTION INTRAVENOUS at 07:11

## 2024-11-15 RX ADMIN — FAMOTIDINE 20 MG: 20 TABLET ORAL at 08:11

## 2024-11-15 RX ADMIN — HEPARIN SODIUM 5000 UNITS: 5000 INJECTION INTRAVENOUS; SUBCUTANEOUS at 05:11

## 2024-11-15 RX ADMIN — CYCLOSPORINE 75 MG: 25 CAPSULE, LIQUID FILLED ORAL at 08:11

## 2024-11-15 RX ADMIN — ASPIRIN 81 MG: 81 TABLET, COATED ORAL at 08:11

## 2024-11-15 RX ADMIN — CALCITRIOL CAPSULES 0.25 MCG 0.25 MCG: 0.25 CAPSULE ORAL at 08:11

## 2024-11-15 RX ADMIN — POLYETHYLENE GLYCOL 3350 17 G: 17 POWDER, FOR SOLUTION ORAL at 08:11

## 2024-11-15 RX ADMIN — Medication 400 UNITS: at 08:11

## 2024-11-15 NOTE — PLAN OF CARE
Patient AAO X 4, VSS. Gave PRN pain medication and benadryl for itching. Telemonitor in place NSR. Neuro check Q4. Skin intact. EEG completed and PET scan pending. Seizure precautions in place. Patient did have one BM during shift. Up ambulatory with standby assist, bed locked at lowest setting, yellow socks on, call bell in reach  Remains free from falls.

## 2024-11-15 NOTE — TELEPHONE ENCOUNTER
----- Message from Aris sent at 11/15/2024 12:55 PM CST -----  Contact: Nadia  .Type:  Needs Medical Advice    Who Called:  Nadia     Would the patient rather a call back or a response via [facility]? Call back   Best Call Back Number:  [phone]   Additional Information:  Pt is calling in regard to still experiencing back pain and would also like to know if she needs to get a follow up appt scheduled       Thanks

## 2024-11-15 NOTE — PLAN OF CARE
[facility] - Trans[facility] Stepdown  Discharge Final Note    Primary Care Provider: Elis Wick MD    Expected Discharge Date: 11/15/2024    Patient discharged to home via lyft transportation paid by hospital.     Patient's bedside nurse went over discharge instructions.    Discharge Plan A and Plan B have been determined by review of patient's clinical status, future medical and therapeutic needs, and coverage/benefits for post-acute care in coordination with multidisciplinary team members.        Final Discharge Note (most recent)       Final Note - 11/15/24 1300          Final Note    Assessment Type Discharge Planning Assessment        Post-Acute Status    Coverage [employer], Medicaid                     Important Message from Medicare                 Future Appointments   Date Time Provider Department Center   11/19/2024  2:00 PM Génesis Gonzales LCSW BS [facility]   11/25/2024  1:30 PM Guicho Godinez MD ON ENT  [facility]   12/12/2024 10:10 AM LABORATORY, [facility]   12/19/2024  1:30 PM Carter Crawford MD [facility] NEPHRO [facility]   12/20/2024  2:30 PM SANTIAGO BRANTLEY CC LAB [facility] LAB DS [facility]   12/20/2024  3:30 PM Yudith Mendoza, NP BR HEM ONC [facility]   12/20/2024  4:00 PM INJECTION 1, [facility] INFUSION [facility] INFSN [facility]   1/16/2025 11:00 AM LABORATORY, [facility]   1/17/2025 10:00 AM Sami Cochran MD ON UROLOGY  Medical [facility]   1/23/2025  2:30 PM Prasanth Johnson MD ON RHEU  Medical [facility]   4/9/2025 10:40 AM Christoph Douglas MD [facility] GENSUR [facility] [place]       CM scheduled post-discharge follow-up appointment and information added to AVS.      Lizbet Orellana, RN, BSN  Case Management  [phone]

## 2024-11-15 NOTE — PLAN OF CARE
AAOx4, afebrile, c/o pain in back. Pt has uncontrolled itching throughout her body. 25 mg of IV benadryl given with little relief. Dr. Bush aware. Benadryl cream applied and IV benadryl dose increased to 50mg. Prn vistaril ordered and given. No rash noted on pt's skin. Tele monitor in place. Cardiology, HTS, Neurology following. Pt is NPO for PET stress scan. Coreg d/c. Neuro checks q4h. Seizure precautions (suction set up and side rails padded) in place. Pt is x 1person assist OOB with cane. Bed alarm on. Pt in lowest position, side rails up x2, non-skid foot wear in place, call light within reach, pt verbalized understanding to call RN when needed. Hand hygiene practiced per protocol. Will continue to monitor.

## 2024-11-15 NOTE — PROGRESS NOTES
"[facility] - Transplant Stepdown  Cardiology  Progress Note    Patient Name: Nadia Damon  MRN: [id number]  Admission Date: 11/13/2024  Hospital Length of Stay: 0 days  Code Status: Full Code   Attending Physician: Nixon Page*   Primary Care Physician: Elis Wick MD  Expected Discharge Date: 11/16/2024  Principal Problem:Seizure-like activity    Subjective:   HPI:   Patient is a 69-year-old female with medical history of heart transplant 2/2 cardiomyopathy (1993 on cyclosporine), HFpEF, DCIS s/p radiation 2022, CKD 5, Stroke (Right MCA) 7/2024. Initially presented to the outpatient clinic for routine dobutamine stress echo. Developed chest pain and palpitation during stress test then developed SOB, wheezes when nitro spray x 2 was given for chest pain. Taken to ED in [facility]. Symptoms resolved after epi, steroids, famotidine, ativan. Admitted there for observation. The next day patient developed chest, appears to be cardiac in nature and was recurrent. Morphine IV given a few times for CP, developed respiratory depression needing narcan with symptoms. [place] [facility]. General cardiology consulted for recurrent CP and transplant cardiology consulted as patient is on cyclosporin post transplant.   Upon evaluation patient reports her chest pain has improved to 4/10, pressure like "someone sitting on the chest", left sided, non radiating, worsened with activity and morphine provided some relief. Denies similar pain in the past. Denies taking NTG SL since the transplant. Prior to transplant (1993) she used to take NTG SL which would provide relief. Reports able to perform ADL. Uses walker for support after her CVA. Denies DONOHUE, CP, Palpitation, SOB, N/V, Diaphoresis prior to the stress echo.   Troponin was slight elevated at previous facility but trended down on repeat. EKG did not show acute ischemic changes. There was NSR, RBBB, left anterior fascicular " block that was present on previous EKG. Patient reports morphine has been helping with the pain. Pt sees nephrology for CKD 5 and there was discussion about dialysis which patient hasn't decided on yet. She able to produce urine.     Interval history: Patient was NPO overnight. Had Cardiac PET stress scan done this morning, pending final read. No CP, SOB, leg swelling, n/v, dizziness during test. Cr 3.9. BUN 58, CO2 26.     Review of Systems   Constitutional: Negative for chills, diaphoresis and night sweats.   Cardiovascular:  Negative for chest pain (pressure like, left sided resolved), dyspnea on exertion, irregular heartbeat, leg swelling, near-syncope, palpitations and paroxysmal nocturnal dyspnea.   Respiratory:  Negative for cough, shortness of breath and wheezing.    Skin:  Negative for color change and dry skin.   Musculoskeletal:  Negative for joint pain and joint swelling.   Gastrointestinal:  Negative for abdominal pain, diarrhea, nausea and vomiting.   Genitourinary:  Negative for dysuria.   Neurological:  Negative for dizziness, headaches, light-headedness and weakness.   All other systems reviewed and are negative.    Objective:     Vital Signs (Most Recent):  Temp: 98.3 °F (36.8 °C) (11/15/24 0830)  Pulse: 83 (11/15/24 0830)  Resp: 18 (11/15/24 0830)  BP: (!) 152/84 (11/15/24 0830)  SpO2: 97 % (11/15/24 0830) Vital Signs (24h Range):  Temp:  [98 °F (36.7 °C)-98.7 °F (37.1 °C)] 98.3 °F (36.8 °C)  Pulse:  [] 83  Resp:  [16-18] 18  SpO2:  [92 %-100 %] 97 %  BP: (125-173)/() 152/84     Weight: 64 kg (141 lb)  Body mass index is 26.64 kg/m².    SpO2: 97 %         Intake/Output Summary (Last 24 hours) at 11/15/2024 1038  Last data filed at 11/15/2024 0441  Gross per 24 hour   Intake 540 ml   Output 900 ml   Net -360 ml       Lines/Drains/Airways       Peripheral Intravenous Line  Duration                  Peripheral IV - Single Lumen 11/13/24 1214 Anterior;Proximal;Right Forearm 1 day          "            Physical Exam  Constitutional:       General: She is not in acute distress.     Appearance: Normal appearance.   HENT:      Head: Normocephalic.      Nose: No congestion.      Mouth/Throat:      Mouth: Mucous membranes are moist.   Cardiovascular:      Rate and Rhythm: Normal rate and regular rhythm.      Heart sounds: No murmur heard.     Comments: No jvd  Pulmonary:      Effort: No respiratory distress.      Breath sounds: Normal breath sounds. No wheezing or rales.   Abdominal:      General: Bowel sounds are normal.   Musculoskeletal:         General: No tenderness.      Right lower leg: No edema.      Left lower leg: No edema.   Skin:     General: Skin is warm.      Capillary Refill: Capillary refill takes less than 2 seconds.   Neurological:      General: No focal deficit present.      Mental Status: She is alert and oriented to person, place, and time.          Significant Labs: BMP:   Recent Labs   Lab 11/14/24  0557 11/15/24  0603   GLU 99 76    140   K 4.2 4.0    105   CO2 26 26   BUN 58* 58*   CREATININE 4.1* 3.9*   CALCIUM 8.6* 8.4*   MG 2.2 2.0   , CBC   Recent Labs   Lab 11/14/24  0557 11/15/24  0603   WBC 3.72* 3.52*   HGB 9.9* 9.3*   HCT 31.7* 29.8*    175   , and Troponin   No results for input(s): "TROPONINI" in the last 48 hours.      Significant Imaging: Echocardiogram: Transthoracic echo (TTE) complete (Cupid Only):   Results for orders placed or performed during the hospital encounter of 11/13/24   Echo   Result Value Ref Range    LV Diastolic Volume 74.54 mL    Echo EF Estimated 67 %    LV Systolic Volume 24.39 mL    IVS 1.0 0.6 - 1.1 cm    LVIDd 4.1 3.5 - 6.0 cm    LVIDs 2.6 2.1 - 4.0 cm    LVOT diameter 2.3 cm    PW 1.0 0.6 - 1.1 cm    AV LVOT peak gradient 3 mmHg    LVOT mn grad 1 mmHg    LVOT peak ambika 0.8 m/s    LVOT peak VTI 16.7 cm    RV- marie basal diam 3.7 cm    RV S' 4.35 cm/s    LA size 4.44 cm    AV valve area 4.5 cm2    AV area by cont VTI 4.5 cm2    " AV peak gradient 3.2 mmHg    AV mean gradient 1.7 mmHg    Ao peak jacky 0.9 m/s    Ao VTI 15.5 cm    MV Peak A Jacky 0.50 m/s    E wave deceleration time 167.43 ms    MV Peak E Jacky 0.58 m/s    E/A ratio 1.16     LV LATERAL E/E' RATIO 4.83     LV SEPTAL E/E' RATIO 14.50     TDI LATERAL 0.12 m/s    TDI SEPTAL 0.04 m/s    TV peak gradient 46 mmHg    TR Max Jacky 3.2 m/s    Ascending aorta 2.97 cm    STJ 2.85 cm    IVC diameter 0.71 cm    Sinus 3.07 cm    TAPSE 0.76 cm    BSA 1.68 m2    LVOT stroke volume 69.3 cm3    LV Systolic Volume Index 14.7 mL/m2    LV Diastolic Volume Index 44.90 mL/m2    LVOT area 4.2 cm2    FS 36.6 28 - 44 %    Left Ventricle Relative Wall Thickness 0.49 cm    LV mass 132.1 g    LV Mass Index 79.6 g/m2    E/E' ratio 7.25 m/s    RV/LV Ratio 0.90 cm    AV Velocity Ratio 0.89     AV index (prosthetic) 1.08     PRESLEY by Velocity Ratio 3.7 cm²    Triscuspid Valve Regurgitation Peak Gradient 41 mmHg    Mean e' 0.08 m/s    ZLVIDS -0.81     ZLVIDD -1.26     TV resting pulmonary artery pressure 44 mmHg    RV TB RVSP 6 mmHg    Est. RA pres 3 mmHg    Narrative      Post cardiac transplantation study - OHTx 5/27/1993 at [facility]    Left Ventricle: The left ventricle is normal in size. Normal wall   thickness. There is concentric remodeling. There is normal systolic   function with a visually estimated ejection fraction of 55 - 60%. There is   indeterminate diastolic function.    Right Ventricle: Normal right ventricular cavity size. Wall thickness   is normal. Systolic function is borderline low.    Tricuspid Valve: There is mild regurgitation.    Pulmonary Artery: There is mild pulmonary hypertension. The estimated   pulmonary artery systolic pressure is 44 mmHg.    IVC/SVC: Normal venous pressure at 3 mmHg.       Assessment and Plan:       Chest pain  Patient has heart transplant Hx, HFpEF, Stroke, CKD 5. Developed chest pain and palpitation during stress but EKG part of the test was neg for ischemia. Due  to persistent CP given NGT spray leading to allergic reaction. Recurrent CP episodes with initial elevation of troponin which trended down on repeat. Since patient had heart transplant and pain appears to be cardiac in nature. Patient transferred to [facility] [place] for higher level care.     Troponin 0.055--> 0.059--> 0.031   EKG: NSR w/PACs , RBBB, left anterior fascicular no acute ischemic changes when compared to previous EKG these findings were present  Dobutamine Stress Echo 11/12/2024: ECG Conclusion: The ECG portion of the study is negative for ischemia.  Post-stress Conclusion: The study is negative with no echocardiographic evidence of stress induced ischemia. Unable to get post stress images due to severe chest pain.   Physical exam w/o sign of volume overload.     Patient reports her pain has improved after morphine and dilaudid also helps but does not completely resolve the symptoms. NTG SL used to help prior to heart transplant (1993) but reports never needing NTG since transplant. Now allergic reaction to Nitro spray?. Ideally the patient would need coronary angio but given her renal function that is out of picture. Patient tolerated Cardiac PET Stress well, was asymptomatic during test and has no symptoms currently.      - Cardiac PET Stress performed this morning (11/15/24), pending final read   - Cardiology will continue to follow             VTE Risk Mitigation (From admission, onward)           Ordered     heparin (porcine) injection 5,000 Units  Every 8 hours         11/13/24 2322     IP VTE HIGH RISK PATIENT  Once         11/13/24 2322     Place sequential compression device  Until discontinued         11/13/24 2322                    Yulia Lara MD  Cardiology  [facility] - Transplant Stepdown

## 2024-11-15 NOTE — PROGRESS NOTES
"[facility] - Transplant Stepdown  Cardiology  Progress Note    Patient Name: Nadia Damon  MRN: [id number]  Admission Date: 11/13/2024  Hospital Length of Stay: 0 days  Code Status: Full Code   Attending Physician: Nixon Page*   Primary Care Physician: Elis Wick MD  Expected Discharge Date: 11/16/2024  Principal Problem:Seizure-like activity    Subjective:     HPI:   Patient is a 69-year-old female with medical history of heart transplant 2/2 cardiomyopathy (1993 on cyclosporine), HFpEF, DCIS s/p radiation 2022, CKD 5, Stroke (Right MCA) 7/2024. Initially presented to the outpatient clinic for routine dobutamine stress echo. Developed chest pain and palpitation during stress test then developed SOB, wheezes when nitro spray x 2 was given for chest pain. Taken to ED in [facility]. Symptoms resolved after epi, steroids, famotidine, ativan. Admitted there for observation. The next day patient developed chest, appears to be cardiac in nature and was recurrent. Morphine IV given a few times for CP, developed respiratory depression needing narcan with symptoms. Patient was transferred to [facility] for higher level of care. General cardiology consulted for recurrent CP and transplant cardiology consulted as patient is on cyclosporin post transplant.   Upon evaluation patient reports her chest pain has improved to 4/10, pressure like "someone sitting on the chest", left sided, non radiating, worsened with activity and morphine provided some relief. Denies similar pain in the past. Denies taking NTG SL since the transplant. Prior to transplant (1993) she used to take NTG SL which would provide relief. Reports able to perform ADL. Uses walker for support after her CVA. Denies DONOHUE, CP, Palpitation, SOB, N/V, Diaphoresis prior to the stress echo.   Troponin was slight elevated at previous facility but trended down on repeat. EKG did not show acute ischemic changes. There was NSR, RBBB, left anterior fascicular " block that was present on previous EKG. Patient reports morphine has been helping with the pain. Pt sees nephrology for CKD 5 and there was discussion about dialysis which patient hasn't decided on yet. She able to produce urine.     Interval history: Patient was NPO overnight. Had Cardiac PET stress scan done this morning, pending final read. No CP, SOB, leg swelling, n/v, dizziness during test. Cr 3.9. BUN 58, CO2 26.     Review of Systems   Constitutional: Negative for chills, diaphoresis and night sweats.   Cardiovascular:  Negative for chest pain (pressure like, left sided resolved), dyspnea on exertion, irregular heartbeat, leg swelling, near-syncope, palpitations and paroxysmal nocturnal dyspnea.   Respiratory:  Negative for cough, shortness of breath and wheezing.    Skin:  Negative for color change and dry skin.   Musculoskeletal:  Negative for joint pain and joint swelling.   Gastrointestinal:  Negative for abdominal pain, diarrhea, nausea and vomiting.   Genitourinary:  Negative for dysuria.   Neurological:  Negative for dizziness, headaches, light-headedness and weakness.   All other systems reviewed and are negative.    Objective:     Vital Signs (Most Recent):  Temp: 98.3 °F (36.8 °C) (11/15/24 0830)  Pulse: 83 (11/15/24 0830)  Resp: 18 (11/15/24 0830)  BP: (!) 152/84 (11/15/24 0830)  SpO2: 97 % (11/15/24 0830) Vital Signs (24h Range):  Temp:  [98 °F (36.7 °C)-98.7 °F (37.1 °C)] 98.3 °F (36.8 °C)  Pulse:  [] 83  Resp:  [16-18] 18  SpO2:  [92 %-100 %] 97 %  BP: (125-173)/() 152/84     Weight: 64 kg (141 lb)  Body mass index is 26.64 kg/m².    SpO2: 97 %         Intake/Output Summary (Last 24 hours) at 11/15/2024 1038  Last data filed at 11/15/2024 0441  Gross per 24 hour   Intake 540 ml   Output 900 ml   Net -360 ml       Lines/Drains/Airways       Peripheral Intravenous Line  Duration                  Peripheral IV - Single Lumen 11/13/24 1214 Anterior;Proximal;Right Forearm 1 day          "            Physical Exam  Constitutional:       General: She is not in acute distress.     Appearance: Normal appearance.   HENT:      Head: Normocephalic.      Nose: No congestion.      Mouth/Throat:      Mouth: Mucous membranes are moist.   Cardiovascular:      Rate and Rhythm: Normal rate and regular rhythm.      Heart sounds: No murmur heard.     Comments: No jvd  Pulmonary:      Effort: No respiratory distress.      Breath sounds: Normal breath sounds. No wheezing or rales.   Abdominal:      General: Bowel sounds are normal.   Musculoskeletal:         General: No tenderness.      Right lower leg: No edema.      Left lower leg: No edema.   Skin:     General: Skin is warm.      Capillary Refill: Capillary refill takes less than 2 seconds.   Neurological:      General: No focal deficit present.      Mental Status: She is alert and oriented to person, place, and time.          Significant Labs: BMP:   Recent Labs   Lab 11/14/24  0557 11/15/24  0603   GLU 99 76    140   K 4.2 4.0    105   CO2 26 26   BUN 58* 58*   CREATININE 4.1* 3.9*   CALCIUM 8.6* 8.4*   MG 2.2 2.0   , CBC   Recent Labs   Lab 11/14/24  0557 11/15/24  0603   WBC 3.72* 3.52*   HGB 9.9* 9.3*   HCT 31.7* 29.8*    175   , and Troponin   No results for input(s): "TROPONINI" in the last 48 hours.      Significant Imaging: Echocardiogram: Transthoracic echo (TTE) complete (Cupid Only):   Results for orders placed or performed during the hospital encounter of 11/13/24   Echo   Result Value Ref Range    LV Diastolic Volume 74.54 mL    Echo EF Estimated 67 %    LV Systolic Volume 24.39 mL    IVS 1.0 0.6 - 1.1 cm    LVIDd 4.1 3.5 - 6.0 cm    LVIDs 2.6 2.1 - 4.0 cm    LVOT diameter 2.3 cm    PW 1.0 0.6 - 1.1 cm    AV LVOT peak gradient 3 mmHg    LVOT mn grad 1 mmHg    LVOT peak ambika 0.8 m/s    LVOT peak VTI 16.7 cm    RV- marie basal diam 3.7 cm    RV S' 4.35 cm/s    LA size 4.44 cm    AV valve area 4.5 cm2    AV area by cont VTI 4.5 cm2    " AV peak gradient 3.2 mmHg    AV mean gradient 1.7 mmHg    Ao peak jacky 0.9 m/s    Ao VTI 15.5 cm    MV Peak A Jacky 0.50 m/s    E wave deceleration time 167.43 ms    MV Peak E Jacky 0.58 m/s    E/A ratio 1.16     LV LATERAL E/E' RATIO 4.83     LV SEPTAL E/E' RATIO 14.50     TDI LATERAL 0.12 m/s    TDI SEPTAL 0.04 m/s    TV peak gradient 46 mmHg    TR Max Jacky 3.2 m/s    Ascending aorta 2.97 cm    STJ 2.85 cm    IVC diameter 0.71 cm    Sinus 3.07 cm    TAPSE 0.76 cm    BSA 1.68 m2    LVOT stroke volume 69.3 cm3    LV Systolic Volume Index 14.7 mL/m2    LV Diastolic Volume Index 44.90 mL/m2    LVOT area 4.2 cm2    FS 36.6 28 - 44 %    Left Ventricle Relative Wall Thickness 0.49 cm    LV mass 132.1 g    LV Mass Index 79.6 g/m2    E/E' ratio 7.25 m/s    RV/LV Ratio 0.90 cm    AV Velocity Ratio 0.89     AV index (prosthetic) 1.08     PRESLEY by Velocity Ratio 3.7 cm²    Triscuspid Valve Regurgitation Peak Gradient 41 mmHg    Mean e' 0.08 m/s    ZLVIDS -0.81     ZLVIDD -1.26     TV resting pulmonary artery pressure 44 mmHg    RV TB RVSP 6 mmHg    Est. RA pres 3 mmHg    Narrative      Post cardiac transplantation study - OHTx 5/27/1993 at [facility]    Left Ventricle: The left ventricle is normal in size. Normal wall   thickness. There is concentric remodeling. There is normal systolic   function with a visually estimated ejection fraction of 55 - 60%. There is   indeterminate diastolic function.    Right Ventricle: Normal right ventricular cavity size. Wall thickness   is normal. Systolic function is borderline low.    Tricuspid Valve: There is mild regurgitation.    Pulmonary Artery: There is mild pulmonary hypertension. The estimated   pulmonary artery systolic pressure is 44 mmHg.    IVC/SVC: Normal venous pressure at 3 mmHg.       Assessment and Plan:       Chest pain  Patient has heart transplant Hx, HFpEF, Stroke, CKD 5. Developed chest pain and palpitation during stress but EKG part of the test was neg for ischemia. Due  to persistent CP given NGT spray leading to allergic reaction. Recurrent CP episodes with initial elevation of troponin which trended down on repeat. Since patient had heart transplant and pain appears to be cardiac in nature. Patient transferred to [facility] for higher level care.     Troponin 0.055--> 0.059--> 0.031   EKG: NSR w/PACs , RBBB, left anterior fascicular no acute ischemic changes when compared to previous EKG these findings were present  Dobutamine Stress Echo 11/12/2024: ECG Conclusion: The ECG portion of the study is negative for ischemia.  Post-stress Conclusion: The study is negative with no echocardiographic evidence of stress induced ischemia. Unable to get post stress images due to severe chest pain.   Physical exam w/o sign of volume overload.     Patient reports her pain has improved after morphine and dilaudid also helps but does not completely resolve the symptoms. NTG SL used to help prior to heart transplant (1993) but reports never needing NTG since transplant. Now allergic reaction to Nitro spray?. Ideally the patient would need coronary angio but given her renal function that is out of picture. Patient tolerated Cardiac PET Stress well, was asymptomatic during test and has no symptoms currently.      - Cardiac PET Stress performed this morning (11/15/24), reviewed and study was negative for ischemia   - Cardiology will sign off             VTE Risk Mitigation (From admission, onward)           Ordered     heparin (porcine) injection 5,000 Units  Every 8 hours         11/13/24 2322     IP VTE HIGH RISK PATIENT  Once         11/13/24 2322     Place sequential compression device  Until discontinued         11/13/24 2322                    Yulia Lara MD  Cardiology  [facility] - Transplant Stepdown

## 2024-11-15 NOTE — ASSESSMENT & PLAN NOTE
Patient has heart transplant Hx, HFpEF, Stroke, CKD 5. Developed chest pain and palpitation during stress but EKG part of the test was neg for ischemia. Due to persistent CP given NGT spray leading to allergic reaction. Recurrent CP episodes with initial elevation of troponin which trended down on repeat. Since patient had heart transplant and pain appears to be cardiac in nature. Patient transferred to [facility] for higher level care.     Troponin 0.055--> 0.059--> 0.031   EKG: NSR w/PACs , RBBB, left anterior fascicular no acute ischemic changes when compared to previous EKG these findings were present  Dobutamine Stress Echo 11/12/2024: ECG Conclusion: The ECG portion of the study is negative for ischemia.  Post-stress Conclusion: The study is negative with no echocardiographic evidence of stress induced ischemia. Unable to get post stress images due to severe chest pain.   Physical exam w/o sign of volume overload.     Patient reports her pain has improved after morphine and dilaudid also helps but does not completely resolve the symptoms. NTG SL used to help prior to heart transplant (1993) but reports never needing NTG since transplant. Now allergic reaction to Nitro spray?. Ideally the patient would need coronary angio but given her renal function that is out of picture. Patient tolerated Cardiac PET Stress well, was asymptomatic during test and has no symptoms currently.      - Cardiac PET Stress performed this morning (11/15/24), reviewed and study was negative for ischemia   - Cardiology will sign off

## 2024-11-15 NOTE — PLAN OF CARE
[facility] - Transplant Stepdown  Initial Discharge Assessment       Primary Care Provider: Elis Wick MD    Admission Diagnosis: Chest pain [R07.9]    Admission Date: 11/13/2024  Expected Discharge Date: 11/15/2024    Transition of Care Barriers: None    Payor: HUMANA MANAGED MEDICARE / Plan: HUMANA SNP HMO PPO SPECIAL NEEDS / Product Type: Medicare Advantage /     Extended Emergency Contact Information  Primary Emergency Contact: Moy Watkins  Mobile Phone: [phone]  Relation: Healthcare Power of   Secondary Emergency Contact: Elisa Watkins  Mobile Phone: [phone]  Relation: Relative    Discharge Plan A: Home with family  Discharge Plan B: Home      [id number] - [place] - [address] [address]  Phone: [phone] Fax: [phone]    [facility] - [facility] [address]  [address] [address]  Phone: [phone] Fax: [phone]    [facility] [address]  [place] [address]  Phone: [phone] Fax: [phone]      Initial Assessment (most recent)       Adult Discharge Assessment - 11/15/24 1300          Discharge Assessment    Assessment Type Discharge Planning Assessment     Confirmed/corrected address, phone number and insurance Yes     Confirmed Demographics Correct on Facesheet     Source of Information patient     Communicated RATNA with patient/caregiver Yes     Reason For Admission Seizure like activity     People in Home alone     Do you expect to return to your current living situation? Yes     Do you have help at home or someone to help you manage your care at home? Yes     Who are your caregiver(s) and their phone number(s)? Moy Watkins (son) [phone]     Prior to hospitilization cognitive status: Alert/Oriented;No Deficits     Current cognitive status: Alert/Oriented;No Deficits     Walking or  Climbing Stairs Difficulty yes     Walking or Climbing Stairs ambulation difficulty, requires equipment     Mobility Management cane     Dressing/Bathing Difficulty no     Do you have any problems with: Errands/Grocery     Home Accessibility not wheelchair accessible     Home Layout Able to live on 1st floor     Equipment Currently Used at Home cane, straight     Readmission within 30 days? Yes     Patient currently being followed by outpatient case management? No     Do you currently have service(s) that help you manage your care at home? No     Do you take prescription medications? Yes     Do you have prescription coverage? Yes     Coverage [employer], Medicaid     Do you have any problems affording any of your prescribed medications? No     Is the patient taking medications as prescribed? yes     Who is going to help you get home at discharge? [employer]     How do you get to doctors appointments? family or friend will provide     Are you on dialysis? No     Do you take coumadin? No     Discharge Plan A Home with family     Discharge Plan B Home     DME Needed Upon Discharge  none     Discharge Plan discussed with: Patient     Transition of Care Barriers None        Financial Resource Strain    How hard is it for you to pay for the very basics like food, housing, medical care, and heating? Not hard at all        Housing Stability    In the last 12 months, was there a time when you were not able to pay the mortgage or rent on time? No     At any time in the past 12 months, were you homeless or living in a shelter (including now)? No        Transportation Needs    Has the lack of transportation kept you from medical appointments, meetings, work or from getting things needed for daily living? No        Food Insecurity    Within the past 12 months, you worried that your food would run out before you got the money to buy more. Never true     Within the past 12 months, the food you bought just didn't last and you didn't have  money to get more. Never true        Stress    Do you feel stress - tense, restless, nervous, or anxious, or unable to sleep at night because your mind is troubled all the time - these days? Not at all        Social Isolation    How often do you feel lonely or isolated from those around you?  Never        Alcohol Use    Q1: How often do you have a drink containing alcohol? Never     Q2: How many drinks containing alcohol do you have on a typical day when you are drinking? Patient does not drink     Q3: How often do you have six or more drinks on one occasion? Never        Utilities    In the past 12 months has the electric, gas, oil, or water company threatened to shut off services in your home? No        Health Literacy    How often do you need to have someone help you when you read instructions, pamphlets, or other written material from your doctor or pharmacy? Never                   CM spoke with patient in Room 37702 at bedside. All information was verified on facesheet. Patient lives in an apartment alone and no pets. Patient states no assistance is needed, she walks with a cane and have strong support system. Patient can ambulate and complete ADL's independently. Patient uses a cane at home, previously used HH services a month ago for a short time.  Patient is not on dialysis nor use Coumadin as a blood thinner. Patient takes medication as prescribed and has resources for all prescriptive needs. Patient will need transportation upon discharge. Will provide lyft services for patient. All Questions and concerns addressed and whiteboard updated with CM contact information. Will continue to follow for course of hospitalization.      Discharge Plan A and Plan B have been determined by review of patient's clinical status, future medical and therapeutic needs, and coverage/benefits for post-acute care in coordination with multidisciplinary team members.     Lizbet Orellana, RN, BSN  Case Management  [phone]

## 2024-11-15 NOTE — SUBJECTIVE & OBJECTIVE
Interval history: Patient was NPO overnight. Had Cardiac PET stress scan done this morning, pending final read. No CP, SOB, leg swelling, n/v, dizziness during test. Cr 3.9. BUN 58, CO2 26.     Review of Systems   Constitutional: Negative for chills, diaphoresis and night sweats.   Cardiovascular:  Negative for chest pain (pressure like, left sided resolved), dyspnea on exertion, irregular heartbeat, leg swelling, near-syncope, palpitations and paroxysmal nocturnal dyspnea.   Respiratory:  Negative for cough, shortness of breath and wheezing.    Skin:  Negative for color change and dry skin.   Musculoskeletal:  Negative for joint pain and joint swelling.   Gastrointestinal:  Negative for abdominal pain, diarrhea, nausea and vomiting.   Genitourinary:  Negative for dysuria.   Neurological:  Negative for dizziness, headaches, light-headedness and weakness.   All other systems reviewed and are negative.    Objective:     Vital Signs (Most Recent):  Temp: 98.3 °F (36.8 °C) (11/15/24 0830)  Pulse: 83 (11/15/24 0830)  Resp: 18 (11/15/24 0830)  BP: (!) 152/84 (11/15/24 0830)  SpO2: 97 % (11/15/24 0830) Vital Signs (24h Range):  Temp:  [98 °F (36.7 °C)-98.7 °F (37.1 °C)] 98.3 °F (36.8 °C)  Pulse:  [] 83  Resp:  [16-18] 18  SpO2:  [92 %-100 %] 97 %  BP: (125-173)/() 152/84     Weight: 64 kg (141 lb)  Body mass index is 26.64 kg/m².    SpO2: 97 %         Intake/Output Summary (Last 24 hours) at 11/15/2024 1038  Last data filed at 11/15/2024 0441  Gross per 24 hour   Intake 540 ml   Output 900 ml   Net -360 ml       Lines/Drains/Airways       Peripheral Intravenous Line  Duration                  Peripheral IV - Single Lumen 11/13/24 1214 Anterior;Proximal;Right Forearm 1 day                     Physical Exam  Constitutional:       General: She is not in acute distress.     Appearance: Normal appearance.   HENT:      Head: Normocephalic.      Nose: No congestion.      Mouth/Throat:      Mouth: Mucous membranes are  "moist.   Cardiovascular:      Rate and Rhythm: Normal rate and regular rhythm.      Heart sounds: No murmur heard.     Comments: No jvd  Pulmonary:      Effort: No respiratory distress.      Breath sounds: Normal breath sounds. No wheezing or rales.   Abdominal:      General: Bowel sounds are normal.   Musculoskeletal:         General: No tenderness.      Right lower leg: No edema.      Left lower leg: No edema.   Skin:     General: Skin is warm.      Capillary Refill: Capillary refill takes less than 2 seconds.   Neurological:      General: No focal deficit present.      Mental Status: She is alert and oriented to person, place, and time.          Significant Labs: BMP:   Recent Labs   Lab 11/14/24  0557 11/15/24  0603   GLU 99 76    140   K 4.2 4.0    105   CO2 26 26   BUN 58* 58*   CREATININE 4.1* 3.9*   CALCIUM 8.6* 8.4*   MG 2.2 2.0   , CBC   Recent Labs   Lab 11/14/24  0557 11/15/24  0603   WBC 3.72* 3.52*   HGB 9.9* 9.3*   HCT 31.7* 29.8*    175   , and Troponin   No results for input(s): "TROPONINI" in the last 48 hours.      Significant Imaging: Echocardiogram: Transthoracic echo (TTE) complete (Cupid Only):   Results for orders placed or performed during the hospital encounter of 11/13/24   Echo   Result Value Ref Range    LV Diastolic Volume 74.54 mL    Echo EF Estimated 67 %    LV Systolic Volume 24.39 mL    IVS 1.0 0.6 - 1.1 cm    LVIDd 4.1 3.5 - 6.0 cm    LVIDs 2.6 2.1 - 4.0 cm    LVOT diameter 2.3 cm    PW 1.0 0.6 - 1.1 cm    AV LVOT peak gradient 3 mmHg    LVOT mn grad 1 mmHg    LVOT peak jacky 0.8 m/s    LVOT peak VTI 16.7 cm    RV- marie basal diam 3.7 cm    RV S' 4.35 cm/s    LA size 4.44 cm    AV valve area 4.5 cm2    AV area by cont VTI 4.5 cm2    AV peak gradient 3.2 mmHg    AV mean gradient 1.7 mmHg    Ao peak jacky 0.9 m/s    Ao VTI 15.5 cm    MV Peak A Jacky 0.50 m/s    E wave deceleration time 167.43 ms    MV Peak E Jacky 0.58 m/s    E/A ratio 1.16     LV LATERAL E/E' RATIO 4.83  "    LV SEPTAL E/E' RATIO 14.50     TDI LATERAL 0.12 m/s    TDI SEPTAL 0.04 m/s    TV peak gradient 46 mmHg    TR Max Jacky 3.2 m/s    Ascending aorta 2.97 cm    STJ 2.85 cm    IVC diameter 0.71 cm    Sinus 3.07 cm    TAPSE 0.76 cm    BSA 1.68 m2    LVOT stroke volume 69.3 cm3    LV Systolic Volume Index 14.7 mL/m2    LV Diastolic Volume Index 44.90 mL/m2    LVOT area 4.2 cm2    FS 36.6 28 - 44 %    Left Ventricle Relative Wall Thickness 0.49 cm    LV mass 132.1 g    LV Mass Index 79.6 g/m2    E/E' ratio 7.25 m/s    RV/LV Ratio 0.90 cm    AV Velocity Ratio 0.89     AV index (prosthetic) 1.08     PRESLEY by Velocity Ratio 3.7 cm²    Triscuspid Valve Regurgitation Peak Gradient 41 mmHg    Mean e' 0.08 m/s    ZLVIDS -0.81     ZLVIDD -1.26     TV resting pulmonary artery pressure 44 mmHg    RV TB RVSP 6 mmHg    Est. RA pres 3 mmHg    Narrative      Post cardiac transplantation study - OHTx 5/27/1993 at [facility]    Left Ventricle: The left ventricle is normal in size. Normal wall   thickness. There is concentric remodeling. There is normal systolic   function with a visually estimated ejection fraction of 55 - 60%. There is   indeterminate diastolic function.    Right Ventricle: Normal right ventricular cavity size. Wall thickness   is normal. Systolic function is borderline low.    Tricuspid Valve: There is mild regurgitation.    Pulmonary Artery: There is mild pulmonary hypertension. The estimated   pulmonary artery systolic pressure is 44 mmHg.    IVC/SVC: Normal venous pressure at 3 mmHg.

## 2024-11-15 NOTE — TELEPHONE ENCOUNTER
Called patient in regards to her calling earlier but patient didn't answer the phone LVM to call back at earliest convenience.    Yonatan PALMER

## 2024-11-18 ENCOUNTER — TELEPHONE (OUTPATIENT)
Dept: PRIMARY CARE CLINIC | Facility: CLINIC | Age: 69
End: 2024-11-18
Payer: MEDICARE

## 2024-11-18 ENCOUNTER — TELEPHONE (OUTPATIENT)
Dept: PAIN MEDICINE | Facility: CLINIC | Age: 69
End: 2024-11-18
Payer: MEDICARE

## 2024-11-18 ENCOUNTER — PATIENT OUTREACH (OUTPATIENT)
Dept: ADMINISTRATIVE | Facility: CLINIC | Age: 69
End: 2024-11-18
Payer: MEDICARE

## 2024-11-18 ENCOUNTER — PATIENT MESSAGE (OUTPATIENT)
Dept: ADMINISTRATIVE | Facility: CLINIC | Age: 69
End: 2024-11-18
Payer: MEDICARE

## 2024-11-18 NOTE — TELEPHONE ENCOUNTER
----- Message from Jose Miguelphillyshereen sent at 11/18/2024  8:33 AM CST -----  Contact: Nadia  Type:  Patient Returning Call    Who Called:Patient   Who Left Message for Patient:Yonatan Hutton MA  Does the patient know what this is regarding?:Yes   Would the patient rather a call back or a response via [facility]? Call Back   Best Call Back Number:[phone]  Additional Information:

## 2024-11-18 NOTE — TELEPHONE ENCOUNTER
Received call from pt re hospital experience. Pt reports that she had a severe allergic reaction during testing as below. Discharged from [facility] 11.16.2024.     11.12.2024 Dobutamine High Infusion Reaction; severe L sided chest pain after dosage administered for Dobutamine Stress Test. Nitro Drug Shortness Of Breath High Unspecified Audible wheezing - after Nitrolgycerin Spray administered x 2; pt also reports itching from head to bottom of feet.     F/U appt scheduled for 11/202024 at [facility]

## 2024-11-18 NOTE — TELEPHONE ENCOUNTER
Called patient and patient informed me that she was experiencing back pain. I offered an appt with a nurse practitioner for a sooner appt. Pt verbalized understanding.    Yonatan PALMER

## 2024-11-18 NOTE — PROGRESS NOTES
C3 nurse attempted to contact Nadia Damon for a TCC post hospital discharge follow up call. The patient is unable to conduct the call @ this time and requested a callback.    The patient has a scheduled Appt with RUDY Ramey (Primary Care) on 11/20/24 at 1040. Message routed to Elis Wick MD requesting visit type change to 'HOSFU.'

## 2024-11-19 ENCOUNTER — PATIENT MESSAGE (OUTPATIENT)
Dept: ADMINISTRATIVE | Facility: CLINIC | Age: 69
End: 2024-11-19
Payer: MEDICARE

## 2024-11-19 ENCOUNTER — CLINICAL SUPPORT (OUTPATIENT)
Dept: PRIMARY CARE CLINIC | Facility: CLINIC | Age: 69
End: 2024-11-19
Payer: MEDICARE

## 2024-11-19 ENCOUNTER — OFFICE VISIT (OUTPATIENT)
Dept: PRIMARY CARE CLINIC | Facility: CLINIC | Age: 69
End: 2024-11-19
Payer: MEDICARE

## 2024-11-19 VITALS
DIASTOLIC BLOOD PRESSURE: 82 MMHG | HEIGHT: 61 IN | TEMPERATURE: 98 F | WEIGHT: 148.25 LBS | SYSTOLIC BLOOD PRESSURE: 160 MMHG | DIASTOLIC BLOOD PRESSURE: 84 MMHG | BODY MASS INDEX: 27.99 KG/M2 | HEART RATE: 94 BPM | SYSTOLIC BLOOD PRESSURE: 158 MMHG | OXYGEN SATURATION: 98 %

## 2024-11-19 DIAGNOSIS — R19.7 DIARRHEA, UNSPECIFIED TYPE: ICD-10-CM

## 2024-11-19 DIAGNOSIS — F43.21 GRIEF AT LOSS OF CHILD: Primary | ICD-10-CM

## 2024-11-19 DIAGNOSIS — Z94.1 HEART TRANSPLANTED: Chronic | ICD-10-CM

## 2024-11-19 DIAGNOSIS — N18.5 ANEMIA ASSOCIATED WITH STAGE 5 CHRONIC RENAL FAILURE: Chronic | ICD-10-CM

## 2024-11-19 DIAGNOSIS — F32.9 REACTIVE DEPRESSION: ICD-10-CM

## 2024-11-19 DIAGNOSIS — N18.5 CKD (CHRONIC KIDNEY DISEASE) STAGE 5, GFR LESS THAN 15 ML/MIN: Chronic | ICD-10-CM

## 2024-11-19 DIAGNOSIS — Z63.4 GRIEF AT LOSS OF CHILD: Primary | ICD-10-CM

## 2024-11-19 DIAGNOSIS — M48.07 SPINAL STENOSIS OF LUMBOSACRAL REGION: Chronic | ICD-10-CM

## 2024-11-19 DIAGNOSIS — R79.89 ABNORMAL SERUM THYROID STIMULATING HORMONE (TSH) LEVEL: Chronic | ICD-10-CM

## 2024-11-19 DIAGNOSIS — R07.9 CHEST PAIN, UNSPECIFIED TYPE: Primary | ICD-10-CM

## 2024-11-19 DIAGNOSIS — F41.9 ANXIETY: ICD-10-CM

## 2024-11-19 DIAGNOSIS — D63.1 ANEMIA ASSOCIATED WITH STAGE 5 CHRONIC RENAL FAILURE: Chronic | ICD-10-CM

## 2024-11-19 PROBLEM — N18.4 STAGE 4 CHRONIC KIDNEY DISEASE: Status: RESOLVED | Noted: 2021-11-22 | Resolved: 2024-11-19

## 2024-11-19 PROBLEM — R41.89 UNRESPONSIVENESS: Status: RESOLVED | Noted: 2024-11-13 | Resolved: 2024-11-19

## 2024-11-19 PROBLEM — R06.02 SOB (SHORTNESS OF BREATH): Status: RESOLVED | Noted: 2024-11-12 | Resolved: 2024-11-19

## 2024-11-19 PROBLEM — E03.9 ACQUIRED HYPOTHYROIDISM: Chronic | Status: ACTIVE | Noted: 2023-02-13

## 2024-11-19 PROCEDURE — 99215 OFFICE O/P EST HI 40 MIN: CPT | Mod: HCNC,S$GLB,, | Performed by: INTERNAL MEDICINE

## 2024-11-19 PROCEDURE — 99417 PROLNG OP E/M EACH 15 MIN: CPT | Mod: HCNC,S$GLB,, | Performed by: INTERNAL MEDICINE

## 2024-11-19 PROCEDURE — 3044F HG A1C LEVEL LT 7.0%: CPT | Mod: HCNC,CPTII,S$GLB, | Performed by: INTERNAL MEDICINE

## 2024-11-19 PROCEDURE — 93010 ELECTROCARDIOGRAM REPORT: CPT | Mod: HCNC,S$GLB,, | Performed by: INTERNAL MEDICINE

## 2024-11-19 PROCEDURE — 99999 PR PBB SHADOW E&M-EST. PATIENT-LVL V: CPT | Mod: PBBFAC,HCNC,, | Performed by: INTERNAL MEDICINE

## 2024-11-19 PROCEDURE — 3077F SYST BP >= 140 MM HG: CPT | Mod: HCNC,CPTII,S$GLB, | Performed by: INTERNAL MEDICINE

## 2024-11-19 PROCEDURE — 1125F AMNT PAIN NOTED PAIN PRSNT: CPT | Mod: HCNC,CPTII,S$GLB, | Performed by: INTERNAL MEDICINE

## 2024-11-19 PROCEDURE — 93005 ELECTROCARDIOGRAM TRACING: CPT | Mod: HCNC,S$GLB,, | Performed by: INTERNAL MEDICINE

## 2024-11-19 PROCEDURE — 3288F FALL RISK ASSESSMENT DOCD: CPT | Mod: HCNC,CPTII,S$GLB, | Performed by: INTERNAL MEDICINE

## 2024-11-19 PROCEDURE — 3066F NEPHROPATHY DOC TX: CPT | Mod: HCNC,CPTII,S$GLB, | Performed by: INTERNAL MEDICINE

## 2024-11-19 PROCEDURE — 99499 UNLISTED E&M SERVICE: CPT | Mod: HCNC,S$GLB,,

## 2024-11-19 PROCEDURE — 1111F DSCHRG MED/CURRENT MED MERGE: CPT | Mod: HCNC,CPTII,S$GLB, | Performed by: INTERNAL MEDICINE

## 2024-11-19 PROCEDURE — 3079F DIAST BP 80-89 MM HG: CPT | Mod: HCNC,CPTII,S$GLB, | Performed by: INTERNAL MEDICINE

## 2024-11-19 PROCEDURE — 1159F MED LIST DOCD IN RCRD: CPT | Mod: HCNC,CPTII,S$GLB, | Performed by: INTERNAL MEDICINE

## 2024-11-19 PROCEDURE — 3008F BODY MASS INDEX DOCD: CPT | Mod: HCNC,CPTII,S$GLB, | Performed by: INTERNAL MEDICINE

## 2024-11-19 PROCEDURE — 1101F PT FALLS ASSESS-DOCD LE1/YR: CPT | Mod: HCNC,CPTII,S$GLB, | Performed by: INTERNAL MEDICINE

## 2024-11-19 PROCEDURE — 1157F ADVNC CARE PLAN IN RCRD: CPT | Mod: HCNC,CPTII,S$GLB, | Performed by: INTERNAL MEDICINE

## 2024-11-19 RX ORDER — NITROGLYCERIN 0.4 MG/1
0.4 TABLET SUBLINGUAL
Status: COMPLETED | OUTPATIENT
Start: 2024-11-19 | End: 2024-11-19

## 2024-11-19 RX ORDER — NITROGLYCERIN 0.4 MG/1
0.4 TABLET SUBLINGUAL EVERY 5 MIN PRN
Qty: 100 TABLET | Refills: 0 | Status: SHIPPED | OUTPATIENT
Start: 2024-11-19 | End: 2025-11-19

## 2024-11-19 RX ADMIN — NITROGLYCERIN 0.4 MG: 0.4 TABLET SUBLINGUAL at 04:11

## 2024-11-19 SDOH — SOCIAL DETERMINANTS OF HEALTH (SDOH): DISSAPEARANCE AND DEATH OF FAMILY MEMBER: Z63.4

## 2024-11-19 NOTE — PROGRESS NOTES
DATE:  2024  REFERRAL SOURCE:  Elis Wick MD  TYPE OF VISIT:  In person  LENGTH OF SESSION: 60  .  HISTORY OF PRESENTING ILLNESS:  Nadia Damon, a 69 y.o. 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       CHIEF COMPLAINT/REASON FOR ENCOUNTER: Pt's chief complaint includes the following:  sleep, and grief.    PSYCHIATRIC HISTORY:  Patient does not currently have a psychiatrist.    Patient does not currently have a therapist.     Pt is taking zolpidem (Ambien) 0.5mg once daily for sleep.  They are not interested in medication changes.  Previous Psychiatric Hospitalizations:  No  History of Trauma:  Heart transplant; CVA.    History of Violence:  No  Access to a gun/weapon:  No  Previous Suicide Attempts:  No    Risk assessment:  Patient reports no suicidal ideation  Patient reports no homicidal ideation  Patient reports no self-injurious behavior  Patient reports no violent behavior    PSYCHIATRIC FAMILY HISTORY:  Parents were alcoholic; SonMoy is an alcoholic.  Grandson has hx of depression    SUBSTANCE ABUSE HISTORY:  Tobacco:  No   Alcohol: none  Illicit Substances: No  Misuse of Prescription Medications:  No    SOCIAL HISTORY (MARRIAGE, EMPLOYMENT, etc.):  Living Situation: Alone, at [facility].   Relationship status Single; never .   Children/Family: 2 sons. 1 sonFlakito, is .  SonMoy Jr lives in [place] with wife.  Estranged granddtrQiana.  Estranged grandsonMoy.  Cousin.   Supports:Gnosticist friends.   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  Worship/Spirituality: Muslim - Living [place] [employer]. Volunteers as a .   Hobbies and Interests: Crafting.     Current social stressors:   CVA 2024.    CKD 4. Has been told she very likely will need to start dialysis soon.  Transplanted heart has reached its max expectancy.   Grandcarmel's    arrest in 2024; news coverage of his arrest.  Very stressful.   Death of son, Flakito.  Years ago.   Loss of contact with Flakito's daughter, Qiana (or Alda Kiran).   Hx of heart transplant in . Has CKD stage 4. "You are looking at a miracle".  Tired of being in the hospital.    Hx of breast cancer; completed chemo and rad tx.   Goal is to drive again; resume her active life.     Current symptoms:  Depression: decreased appetite.  Anxiety: denies.  Insomnia:  chronic insomnia, now on 5mg ambien and temazapan. Still wakes her up once per night. Years of dealing with this.  .  Astrid:  hyperactivity, increased goal directed activity , and racing thoughts or rumination.  Psychosis: denies .    MENTAL HEALTH STATUS EXAM  General Appearance:  unremarkable, age appropriate   Speech: normal tone, normal rate, normal pitch, normal volume      Level of Cooperation: cooperative      Thought Processes: normal and logical   Mood: steady      Thought Content: normal, no suicidality, no homicidality, delusions, or paranoia   Affect: congruent and appropriate   Orientation: Oriented x3   Memory: Appears WNL; patient not tested.    Attention Span & Concentration: intact   Fund of General Knowledge: intact and appropriate to age and level of education   Judgment & Insight: good   Language  intact     Session Content/Presenting Problem Hx:  2024: PHQ 9; LUPE 0  2024: PHQ 13; LUPE 15    2nd session. Continued discussion with patient of her family history. Patient discusses her ongoing sadness related to issues in her family.  She continues to grieve for her son Flakito who  in .  She is very saddened by the loss of contact with Flakito's daughter. She hopes to reunite with her once the child has become an adult and is no longer restricted in visiting by her mother.  Patient worries about her son, Moy Krishnamurthy; she is not hopeful that he will ever successfully stop abusing alcohol.   She worries also about her " palpating the left wrist and hand.  Good range of motion in the fingers of the left hand.    Integumentary:  Warm, dr.  There is soft tissue swelling noted to the left wrist and hand with faint overlying blanchable erythema and increased warmth.  There are no open wounds/lesions or streaking erythema up the arm.      Neurologic:  Alert, oriented times 3, Normal sensory, normal motor function, no focal defects.      Psychiatric:  Cooperative, appropriate mood and affect, normal judgment.         Assessment:  1. Acute gout of left hand, unspecified cause        Plan:  Orders Placed This Encounter   • predniSONE (DELTASONE) 20 MG tablet     History and examination today does seem consistent with a gout flare up.  Upon review of the EMR, his last flare up was in March of 2021, treated successfully with oral steroids.  I discussed this with him and he was agreeable to another course of prednisone which was sent to his pharmacy.  We briefly discussed dietary/lifestyle changes to help prevent gout flare ups.  If symptoms are not improving as expected, he should be re-evaluated to consider an alternative diagnosis such as cellulitis.  He understands and agrees with plan.    None  No follow-ups on file.  Instructions noted per AVS/patient instructions.  The patient indicates understanding of these issues and agrees with the plan.      nathalie MoyJr; she is hopeful that he will be able to deal with his emotional and mental problems and live a more healthy and happy life.  Patient does express contentment with her living environment at [facility]. She enjoys her apartment and has good friends amongst her neighbors. She also enjoys a good friend group through her [employer].  She is active in her [employer] and she participates in activities at [facility].    Patient again expresses her desire to live as long as possible. She is worried about her health at present. She describes a recent event wherein she was having bad chest pain and went to the ER here in [place]. She describes being suddenly transferred late at night to [place] and being admitted there.  She felt alone and afraid; she did not feel the staff communicated well with her as far as what was happening.  She felt uncertain of what was happening to her in the hospital.  She had become somewhat disoriented from the move.  She describes being abruptly discharged the next day and being asked how she would get home. She had to insist on staff finding a  to help her get back to [place].  She then describes a harrowing Uber ride with a young  who drove fast and aggressively despite her asking him to slow down.  Says she was exhausted by the time she got home.  Patient describes having continued chest pain the night before this appointment. She resisted calling 911 for fear of a repeat of such an unnerving experience.  She mentions to [facility] that she continues to feel badly during the session.  [facility] then notified staff; the session was cut short so that patient could be seen by medical staff and evaluated immediately.           Prior Session:   Met with patient for initial session.  Patient is very talkative, her speech is somewhat pressured and she does not make eye contact.  She tells her story in a very quick manner with few pauses.  PHQ and LUPE administered;  "patient denies most symptoms - does not seem accurate.  She says "I go up up up then will cry half the day."  Patient describes herself as someone who will try to push through any obstacles; she tries not to let things get her down.  Ends up feeling overwhelmed.  Fear of being judged by others.   Patient is an only child; parents are . She says both of her parents were alcoholics. Patient says she was very shy as a young girl. 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  Nadia has a cousin here; they talk regularly. Cousin is often hard to get along with. Nadia has lived at [facility] for about 8 years. She is close with people there and likes living there.  She is close with her Orthodox community.  They planned a big party for her 69th birthday this summer.  She tried to do everything to prepare for the party. She was recovering from bladder surgery and had been told to take it easy. Despite that she worked very hard on party prep, including climbing on ladders and blowing up balloons.  She twisted her back trying to lift and move boxes. Then she suffered a stroke. Ended up in hospital and then went to [facility] for inpatient rehab.  Has recently completed outpatient PT/OT.  She has issues with Foot drop and now walks with a cane.  Patient says she has always tried to do too much; attributes this to having been an only child, relying on herself.  She was very depressed and scared from having the stroke. She was sad to have missed the birthday celebration that had been planned.    Patient has had issues with her transplant heart; has had some valve leakage. She has been told that her renal function is worsening. She has been told she may need to start on dialysis soon.  Found this very upsetting. She felt sad and scared when the doctor talked with her about advance care planning. Says she hopes to live into her 90's. She is supposed to stick to a Renal " diet and limit her fluids.   Patient has back issues; she recently had an HELLEN which helped. She sleeps in a lift chair.  Gets up often during the night.  Fell recently and bruised her hip.  Says she was going to get chips and a drink from the machine despite having said she was supposed to limit her sugar and salt.    Patient describes difficulties getting along with her one surviving son, Moy Krishnamurthy. He is an alcoholic; has been since he was young. Has been in rehab many times. Unable to maintain sobriety. When drunk he is angry and rages.  He has lost jobs.  His first wife  him. He is remarried; this wife stays with him. She supports them by teaching, tutoring and taking in renters in their home. The son's only child, Moy, is also troubled. He was mostly raised by his mother. Moy is malcolm which his father does not want to accept.  A few months ago, the grandson Moy was arrested after attacking his stepdad with a machete. The stepdad survived and did not press charges.  Patient was stressed and she was embarrassed by the news coverage.  Moy does not talk to her or to his dad.  Patient stays in touch with Moy's mother so she learns how he is doing.   Nadia's son Moy Krishnaumrthy is her power of ; they are estranged at times but are currently talking. She is on good terms with his wife.   Patient's younger son Flakito  of sepsis in his mid-30's. He had been sick; he did not finish his antibiotics; ended up in renal failure and he . Flakito had always done well in school; he attended [email or web address], he loved to sing in [employer]. Nadia always had a good relationship with Flakito. Before his death, Flakito and his girlfriend Enma had had a baby dtr named Qiana.  Patient lost touch with Enma for 7 years. Enma renamed the child Alda Kiran. Patient consulted an  about grandparent visitation rights but she did not want to force Enma to allow her contact.  Enma did let her see the  child when she was 8 years old; then lost contact again. She is hopeful that someday the child will reach out on her own to get to know her grandmother and her extended family on her dad's side.  Very painful for patient.   Patient names significant stressors: her health issues, death of her son, estrangement of other son, estrangement of grandson, loss of contact with granddaughter, limited family support.  She is also not happy to no longer be driving.  Says her goal is to resume driving and continue to live a full active life.          STRENGTHS AND LIABILITIES: Strength: Patient is expressive/articulate., Strength: Patient is motivated for change., Strength: Patient has positive support network.    IMPRESSION:   My diagnostic impression is Anxiety disorders; anxiety, unspecified [F41.9], MAJOR DEPRESSIVE DISORDER, SINGLE EPISODE, Moderate (F32.1), and uncomp bereavement, as evidenced by patient's description of increased feelings of sadness and fear related to recent hospitalization; feeling down at times, tearfulness, sadness from loss of son, grandson, granddaughter, family stressors, health stressors. She does not sleep well.     TREATMENT GOALS: Anxiety: reducing negative automatic thoughts, reducing physical symptoms of anxiety, and reducing time spent worrying (<30 minutes/day)  Depression: increasing self-reward for positive behaviors (one/day), increasing self-reward for positive thoughts (one/day), and reducing fatigue  Grief: process grief related to son's death, estrangement from a grandson and a granddaughter.     PLAN: In this session a psychosocial evaluation was conducted to get history and determine a treatment plan. CBT will be utilized in future individual  therapy sessions to increase interaction, insight, support, and behavior modification.     NEXT APPOINTMENT: 2 weeks. 12/5/24

## 2024-11-19 NOTE — PROGRESS NOTES
C3 nurse spoke with Nadia Damon for a TCC post hospital discharge follow up call. The patient has a scheduled appt with RUDY Ramey (Primary Care) on 11/20/24 at 1040. Pt requesting to be seen today by her PCP or RUDY Ramey and states she is calling the clinic to request appt change. No messages routed at this time.

## 2024-11-19 NOTE — PROGRESS NOTES
Interventional Pain Progress Note     Referring Physician: No ref. provider found    PCP: Elis Wick MD    Chief Complaint: Low Back Pain    Interval History (11/20/2024):  Patient Nadia Damon presents today for follow-up visit.  Patient was last seen on9/16/2024 T11/12 IL HELLEN with 85% relief. She had a stroke in July and saw neurosurgery following the stroke and was not interested in any surgical intervention. Overall she went through rehab and is improving in mobility and function. She continues to have axial low back pain with the right side worse than the left. No radiculopathy. Pain worse with standing and prolonged walking. Relief with leaning forward. She rates her pain today 7/10.  Patient denies night fever/night sweats, urinary incontinence, bowel incontinence, significant weight loss and significant motor weakness.   Patient denies any other complaints or concerns at this time.      Interval History (3/3/2023):  Patient returns to clinic after procedure.  Patient reports 100% relief and thoracic pain after T11-T12 interlaminar epidural steroid injection on 02/02/2023.  Patient reports occasional throbbing pain across her right lower back with no radiation to her buttocks or lower extremities.  Pain is worse with lifting and prolonged standing, better with heat and topicals.  Pain is rated as a year out 10 currently. Denies any fevers, chills, changes in gait, weakness, or bowel and bladder incontinence        Interval History (1/13/22):  Patient returns to clinic for evaluation of thoracic pain.  Patient reports approximately 2 weeks ago she had sudden onset of lower thoracic pain.  She denies any significant inciting factors to his pain.  Pain starts as a sharp stabbing pain in her lower midline thoracic area and then radiates to her right side to her anterior chest wall.  Pain is worse with standing and walking, better with lying supine.  Pain is rated an 8/10.  Patient reports that this  pain is significantly different from her previous lumbar pain. Denies any fevers, chills,  or bowel and bladder incontinence        Interval History (9/30/22):  Patient returns to clinic after procedure.  Patient reports complete resolution of right lower extremity pain after right sacroiliac joint injection and right L5-S1 facet injection on 08/22/2022.  Primary pain today is tight aching pain in right side of lower back.  Pain is worse with extension and rotation, better with rest and heat.  Patient does report an episode of physical therapy on 09/21/2022 where she experienced increased muscle aches and weakness.  She reports that since this time her symptoms have greatly improved.  She denies any significant weakness today.  Pain is rated a 7/10. Denies any fevers, chills, changes in gait, weakness, or bowel and bladder incontinence        SUBJECTIVE:    Nadia Damon is a 69 y.o. female who presents to the clinic for the evaluation of lower back pain. The pain started 1 year ago and symptoms have been worsening.The pain is located in the right lower back and right buttocks area with radiation to the posterior lateral aspect of her right thigh and occasionally her right calf.  The pain is described as aching, shooting and stabbing and is rated as 5/10.   The pain is rated with a score of  2/10 on the BEST day and a score of 9/10 on the WORST day.  Symptoms interfere with daily activity and work. The pain is exacerbated by Sitting, Standing, Extension and Flexing.  The pain is mitigated by physical therapy and rest.     Patient denies night fever/night sweats, urinary incontinence, bowel incontinence, significant weight loss, significant motor weakness and loss of sensations.      Non-Pharmacologic Treatments:  Physical Therapy/Home Exercise: yes  Ice/Heat:yes  TENS: no  Acupuncture: no  Massage: no  Chiropractic: no        Previous Pain Medications:  Tylenol, topicals, neuropathic,      report:  Reviewed  and consistent with medication use as prescribed.    Pain Procedures:   2023:  T11-12 interlaminar epidural steroid injection, 100% relief  22:  Right L4-5 and L5-S1 radiofrequency ablation, 75% relief  22:  Right sacroiliac joint and right L5-S1 facet injection,   Resolution of right lower extremity pain  2024 T11/12 IL HELLEN with 85% relief    Imagin2024 CT lumbar  FINDINGS:  No vertebral body compression deformity.  Normal bone mineral density.  Normal alignment of the vertebral bodies.  No soft tissue abnormality.  Mild moderate multilevel degenerative disc disease.  Atherosclerotic changes.     Impression:     No acute fracture or dislocation.  Mild moderate multilevel degenerative disc disease.  See recent myelogram for additional insights.    CT THORACIC SPINE WITHOUT CONTRAST 23     CLINICAL HISTORY:  Myelopathy, acute, thoracic spine;  Radiculopathy, thoracic region     TECHNIQUE:  Helical axial images are acquired through the thoracic spine without IV contrast.  Images were reformatted in the coronal sagittal plane.     COMPARISON:  None     FINDINGS:  Paraspinal soft tissues appear within normal limits.  Partially visualized lung fields demonstrate band like opacity at the lingula.  Favor scarring/atelectasis.  Partially visualized abdominal contents are within normal limits.     Alignment is within normal limits.  There is no anterolisthesis or retrolisthesis.  Vertebral body heights are relatively well preserved.  There appears to be a chronic fracture deformity along the posterior spinous process of C7.  Borders of the bone fragment are well corticated.  No evidence of acute injury.  Partially visualized ribs appear intact.     Evaluation for central canal narrowing is limited without intrathecal contrast.     Multilevel degenerative disc changes are present, worst at the mid to lower thoracic spine.  Degenerative disc changes are worst at T11-T12.  There is a 5 mm disc  bulge/herniation at T11-T12.  Disc bulge/herniation is slightly high density with minimal peripheral calcification.     No bony neural foraminal narrowing from C7 to T11.  There is moderate to severe right and mild-to-moderate left neural foraminal narrowing.     Impression:     1. Degenerative disc changes, worst at T11-T12.  Evaluation for central canal narrowing is limited without intrathecal contrast.  2. Bony neural foraminal narrowing is worst at the right T11-T12 foramen.         Results for orders placed during the hospital encounter of 07/13/22    X-Ray Lumbar Spine 5 View    Narrative  EXAMINATION:  XR LUMBAR SPINE COMPLETE 5 VIEW    CLINICAL HISTORY:  Sacrococcygeal disorders, not elsewhere classified    TECHNIQUE:  AP, lateral, spot and bilateral oblique views of the lumbar spine were performed.    COMPARISON:  Lumbar spine radiographs May 17, 2018    FINDINGS:  Minimal grade 1 anterolisthesis of L4 on L5.  No fracture or pars defect.  Unchanged mild disc height loss at the L4-L5 level.  Moderate to severe degenerative disc disease at the T11-T12 level.  Prominent inferior lumbar spine facet arthropathy.  Sacroiliac joints appear normal.  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  No osseous erosion or suspicious osseous lesion.    Impression  As above.      Electronically signed by: Isac Bell  Date:    07/13/2022  Time:    15:39          Past Medical History:   Diagnosis Date    Abdominal wall hernia     CT Renal 6/11/2018---Small fat containing superior ventral abdominal wall hernia at the epicardial pacing lead site.    Abnormal mammogram 10/12/2021    Acute cystitis without hematuria 05/10/2022    Acute ischemic right middle cerebral artery (MCA) stroke 07/20/2024    GRACE (acute kidney injury) 11/22/2021    Anxiety     Aphasia 07/19/2024    Arthritis     ZEN HIPS    Breast cancer in female 08/2021    LEFT BREAST    Breast mass, right  11/13/2024    RIGHT BREAST MASS (Problem)      Bronchitis 08/18/2016    Never smoked      C. difficile colitis 11/29/2021    Cellulitis of axilla, left 12/23/2021    Chronic diastolic heart failure 12/16/2021    Chronic kidney disease     stage 4, GFR 15-29 ml/min    Chronic midline low back pain without sciatica 06/18/2018    CKD (chronic kidney disease) stage 4, GFR 15-29 ml/min 04/20/2016    US Retro   5/16/2018---Mild cortical thinning and increased cortical echogenicity.  Findings can be seen with medical renal disease.  8/31/216--- Echogenic kidneys with diffuse cortical thinning suggesting  medical renal disease. 2 complex right renal cortical cysts.      Closed nondisplaced fracture of distal phalanx of left great toe with routine healing 10/22/2018    Coronary artery disease 1993    heart transplant    COVID-19 in immunocompromised patient 02/26/2024    Cystitis 05/10/2022    Depression     Encounter for blood transfusion     Encounter for palliative care 10/14/2024    Fibromyalgia     on Lyrica    Heart failure     native heart cardiomyopathy    Heart transplanted 1993    due to cardiomyopathy    History of hyperparathyroidism; Hyperparathyroidism, secondary renal     PT DENIES    Hypertension     Immune disorder     anti rejection meds    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    Impaired mobility 07/28/2022    Iron deficiency anemia 08/15/2017    Kidney stones     passed per pt    Obesity     Obesity (BMI 30.0-34.9) 07/22/2019    Other osteoporosis without current pathological fracture 08/30/2019    Other pancytopenia 05/06/2024    Parotitis, acute 09/19/2023    Pharyngitis 01/09/2019    Pneumonia due to infectious organism 03/14/2024    PONV (postoperative nausea and vomiting)     Severe sepsis 11/22/2021    Shingles 2003 approx    left leg    Stage 4 chronic kidney disease 11/22/2021    Subclinical hypothyroidism 06/16/2023    Thrombocytopenia, unspecified 11/29/2021    Trouble in  sleeping     Unresponsiveness 11/13/2024    Urinary incontinence      Past Surgical History:   Procedure Laterality Date    bladder implant Right 06/11/2024    BLADDER SURGERY  2015 approx    mesh - Dr Everett then 2nd reconstructive sx Dr Onofre    BREAST BIOPSY Bilateral     NEGATIVE    BREAST BIOPSY Right 10/31/2022    benign    BREAST LUMPECTOMY Left 2021    BREAST SURGERY Left 09/28/2015    Bx - benign    BREAST SURGERY Right 12/2015    Bx benign    CARDIAC PACEMAKER REMOVAL Left 06/26/2014    Pacer defirillator removed. Put in 1993 aat time of heart transplant    CARPAL TUNNEL RELEASE Left 03/03/2015    Dr. Hall    COLONOSCOPY N/A 02/25/2021    Procedure: COLONOSCOPY;  Surgeon: Freida Ramirez MD;  Location: [facility] [place] ENDO;  Service: Endoscopy;  Laterality: N/A;    CYSTOCELE REPAIR      Twice with mesh removal    EPIDURAL STEROID INJECTION INTO CERVICAL SPINE N/A 02/02/2023    Procedure: T11/T12 IL HELLEN;  Surgeon: Jassi Pierre MD;  Location: [facility] PAIN MGT;  Service: Pain Management;  Laterality: N/A;    EPIDURAL STEROID INJECTION INTO CERVICAL SPINE N/A 9/16/2024    Procedure: T11/T12 IL HELLEN;  Surgeon: Jassi Pierre MD;  Location: [facility] PAIN MGT;  Service: Pain Management;  Laterality: N/A;    HEART TRANSPLANT  1993    HERNIA REPAIR Right 1971 approx    Inguinal    HYSTERECTOMY  1983    vag hyst /LSO     IMPLANTATION OF PERMANENT SACRAL NERVE STIMULATOR N/A 06/11/2024    Procedure: INSERTION, NEUROSTIMULATOR, PERMANENT, SACRAL;  Surgeon: Sami Cochran MD;  Location: [facility] [place] OR;  Service: Urology;  Laterality: N/A;    INCISION AND DRAINAGE OF ABSCESS Left 12/24/2021    Procedure: INCISION AND DRAINAGE, ABSCESS;  Surgeon: Joseph Longo MD;  Location: [facility] OR;  Service: General;  Laterality: Left;    INJECTION OF ANESTHETIC AGENT AROUND MEDIAL BRANCH NERVES INNERVATING LUMBAR FACET JOINT Right 10/19/2022    Procedure: Right L4/L5 and L5/S1 MBB;  Surgeon: Jassi Pierre MD;  Location: [facility] PAIN  MGT;  Service: Pain Management;  Laterality: Right;    INJECTION OF ANESTHETIC AGENT AROUND MEDIAL BRANCH NERVES INNERVATING LUMBAR FACET JOINT Right 11/09/2022    Procedure: Right L4/L5 and L5/S1 MBB;  Surgeon: Jassi Pierre MD;  Location: [facility] PAIN MGT;  Service: Pain Management;  Laterality: Right;    INJECTION OF ANESTHETIC AGENT INTO SACROILIAC JOINT Right 08/22/2022    Procedure: Right SIJ Injection Right L5/S1 Facte Injection;  Surgeon: Jassi Pierre MD;  Location: [facility] PAIN MGT;  Service: Pain Management;  Laterality: Right;    INSERTION OF TUNNELED CENTRAL VENOUS CATHETER (CVC) WITH SUBCUTANEOUS PORT N/A 11/09/2021    Procedure: [id number];  Surgeon: Christoph Douglas MD;  Location: [facility] OR;  Service: General;  Laterality: N/A;    RADIOFREQUENCY THERMOCOAGULATION Right 12/07/2022    Procedure: Right L4/L5 and L5/S1 Lumbar RFA;  Surgeon: Jassi Pierre MD;  Location: [facility] PAIN MGT;  Service: Pain Management;  Laterality: Right;    REMOVAL OF VASCULAR ACCESS PORT      ROBOT-ASSISTED LAPAROSCOPIC ABDOMINAL SACROCOLPOPEXY N/A 08/10/2023    Procedure: ROBOTIC SACROCOLPOPEXY, ABDOMEN;  Surgeon: PRANAY Villalobos MD;  Location: [facility] OR;  Service: OB/GYN;  Laterality: N/A;    ROBOT-ASSISTED LAPAROSCOPIC OOPHORECTOMY Right 08/10/2023    Procedure: ROBOTIC OOPHORECTOMY;  Surgeon: PRANAY Villalobos MD;  Location: [facility] OR;  Service: OB/GYN;  Laterality: Right;    SENTINEL LYMPH NODE BIOPSY Left 10/12/2021    Procedure: BIOPSY, LYMPH NODE, SENTINEL;  Surgeon: Christoph Douglas MD;  Location: [facility] OR;  Service: General;  Laterality: Left;    TOE SURGERY      XI ROBOTIC URETHROPEXY N/A 08/10/2023    Procedure: XI ROBOTIC URETHROPEXY;  Surgeon: PRANAY Villalobos MD;  Location: [facility] OR;  Service: OB/GYN;  Laterality: N/A;     Social History     Socioeconomic History    Marital status: Single    Number of children: 2    Highest education level: 11th grade   Occupational History    Occupation: Retired    Tobacco Use    Smoking status: Never     Passive exposure: Never    Smokeless tobacco: Never   Substance and Sexual Activity    Alcohol use: Never     Alcohol/week: 0.0 standard drinks of alcohol    Drug use: No    Sexual activity: Not Currently     Partners: Male     Birth control/protection: See Surgical Hx   Other Topics Concern    Are you pregnant or think you may be? No    Breast-feeding No   Social History Narrative    Single. 2 children , 1  at 30 yoa  2014 strep throat -  pneumonia and renal complications after not completing course of AB. Other child lives in [place], [place]. Has a cousin locally that could help in an emergency. 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 Caffeine intake =- 1 cola a day. No coffee, + occasional tea, avoids caffeine especially at night. Still drives. She does not have a Living Will or Advanced directive.      Social Drivers of Health     Financial Resource Strain: Low Risk  (11/15/2024)    Overall Financial Resource Strain (CARDIA)     Difficulty of Paying Living Expenses: Not hard at all   Food Insecurity: No Food Insecurity (11/15/2024)    Hunger Vital Sign     Worried About Running Out of Food in the Last Year: Never true     Ran Out of Food in the Last Year: Never true   Recent Concern: Food Insecurity - Food Insecurity Present (2024)    Hunger Vital Sign     Worried About Running Out of Food in the Last Year: Never true     Ran Out of Food in the Last Year: Sometimes true   Transportation Needs: No Transportation Needs (11/15/2024)    TRANSPORTATION NEEDS     Transportation : No   Physical Activity: Inactive (2024)    Exercise Vital Sign     Days of Exercise per Week: 0 days     Minutes of Exercise per Session: 0 min   Stress: No Stress Concern Present (11/15/2024)    [place] [employer] - Occupational Stress Questionnaire     Feeling of Stress : Not at all   Recent Concern: Stress - Stress Concern Present  (11/13/2024)    [place] [employer] - Occupational Stress Questionnaire     Feeling of Stress : To some extent   Housing Stability: Low Risk  (11/15/2024)    Housing Stability Vital Sign     Unable to Pay for Housing in the Last Year: No     Homeless in the Last Year: No     Family History   Problem Relation Name Age of Onset    Cancer Mother  38        breast    Breast cancer Mother      Breast cancer Maternal Grandmother      Heart disease Maternal Grandmother      Hypertension Son      Cataracts Cousin      Diabetes Neg Hx      Stroke Neg Hx      Kidney disease Neg Hx      Asthma Neg Hx      COPD Neg Hx      Melanoma Neg Hx      Hyperlipidemia Neg Hx         Review of patient's allergies indicates:   Allergen Reactions    Dobutamine Other (See Comments)     Severe Left sided chest pain after dosage administered for Dobutamine Stress Test; itching    Lisinopril Swelling and Rash    Nitro Shortness Of Breath     Audible wheezing - after Nitrolgycerin Spray administered x 2; itching    Hydrocodone-acetaminophen Nausea Only    Augmentin [amoxicillin-pot clavulanate] Diarrhea    Zyvox [linezolid] Nausea And Vomiting       Current Outpatient Medications   Medication Sig    aspirin (ECOTRIN) 81 MG EC tablet Take 1 tablet (81 mg total) by mouth once daily.    calcitRIOL (ROCALTROL) 0.25 MCG Cap Take 1 capsule (0.25 mcg total) by mouth once daily.    carvediloL (COREG) 3.125 MG tablet Take 1 tablet (3.125 mg total) by mouth 2 (two) times daily with meals. Hold parameters: SBP less than 110 and/or DBP less than 75    furosemide (LASIX) 20 MG tablet Take 2 tablets (40 mg total) by mouth once daily. HOLD UNTIL FOLLOW UP WITH PCP    nitroGLYCERIN (NITROSTAT) 0.4 MG SL tablet Place 1 tablet (0.4 mg total) under the tongue every 5 (five) minutes as needed for Chest pain. For up to 3 doses. If continued heart pain/pressure after 2nd dose call 911.    ondansetron (ZOFRAN) 4 MG tablet Take 4 mg by mouth as  needed for Nausea.    pantoprazole (PROTONIX) 20 MG tablet TAKE 1 TABLET ONE TIME DAILY    patiromer calcium sorbitex (VELTASSA) 8.4 gram PwPk Take 1 packet (8.4 g total) by mouth once daily    polyethylene glycol (GLYCOLAX) 17 gram PwPk Take 17 g by mouth once daily.    pregabalin (LYRICA) 25 MG capsule Take 1 capsule (25 mg total) by mouth every evening.    sodium bicarbonate 650 MG tablet Take 1 tablet (650 mg total) by mouth 2 (two) times daily.    temazepam (RESTORIL) 30 mg capsule Take 1 capsule (30 mg total) by mouth nightly as needed for Insomnia. FOR INSOMNIA    tiZANidine 4 mg Cap Take 2 mg by mouth nightly as needed (muscle spasm).    vitamin E 400 UNIT capsule Take 400 Units by mouth once daily.    zolpidem (AMBIEN) 5 MG Tab Take 1 tablet (5 mg total) by mouth every evening.    cycloSPORINE modified, NEORAL, (NEORAL) 25 MG capsule Take 3 capsules (75 mg total) by mouth 2 (two) times daily.     No current facility-administered medications for this visit.         ROS  Review of Systems   Constitutional:  Negative for chills, diaphoresis, fatigue and fever.   Respiratory:  Negative for chest tightness, shortness of breath, wheezing and stridor.    Cardiovascular:  Positive for leg swelling. Negative for chest pain.   Gastrointestinal:  Negative for blood in stool, diarrhea, nausea and vomiting.   Endocrine: Negative for cold intolerance and heat intolerance.   Genitourinary:  Negative for dysuria, hematuria and urgency.   Musculoskeletal:  Positive for arthralgias and back pain. Negative for gait problem, joint swelling, myalgias, neck pain and neck stiffness.   Skin:  Negative for rash.   Neurological:  Negative for tremors, seizures, speech difficulty, weakness, light-headedness, numbness and headaches.   Hematological:  Does not bruise/bleed easily.   Psychiatric/Behavioral:  Negative for agitation, confusion and suicidal ideas. The patient is not nervous/anxious.             OBJECTIVE:  BP (!) 142/88    "Pulse (P) 91   Resp 17   Ht 5' 1" (1.549 m)   Wt 67 kg (147 lb 11.3 oz)   LMP 06/20/1983 (Within Years)   BMI 27.91 kg/m²         Physical Exam  Constitutional:       General: She is not in acute distress.     Appearance: Normal appearance. She is not ill-appearing.   HENT:      Head: Normocephalic and atraumatic.      Nose: No congestion or rhinorrhea.      Mouth/Throat:      Mouth: Mucous membranes are moist.   Eyes:      Extraocular Movements: Extraocular movements intact.      Pupils: Pupils are equal, round, and reactive to light.   Cardiovascular:      Pulses: Normal pulses.   Pulmonary:      Effort: Pulmonary effort is normal.   Abdominal:      General: Abdomen is flat.      Palpations: Abdomen is soft.   Musculoskeletal:      Cervical back: Normal range of motion and neck supple.   Skin:     General: Skin is warm and dry.      Capillary Refill: Capillary refill takes less than 2 seconds.   Neurological:      General: No focal deficit present.      Mental Status: She is alert and oriented to person, place, and time.      Cranial Nerves: No cranial nerve deficit.      Sensory: No sensory deficit.      Motor: No weakness or abnormal muscle tone.      Gait: Gait normal.      Deep Tendon Reflexes: Babinski sign absent on the right side. Babinski sign absent on the left side.      Reflex Scores:       Patellar reflexes are 2+ on the right side and 2+ on the left side.       Achilles reflexes are 2+ on the right side and 2+ on the left side.  Psychiatric:         Mood and Affect: Mood normal.         Behavior: Behavior normal.         Thought Content: Thought content normal.           Musculoskeletal:      Lumbar Exam  Incision: no  Pain with Flexion: yes  Pain with Extension: yes > than flexion  ROM:  limited due to pain  Paraspinous TTP:  Right lumbar paraspinous  Facet TTP:  L5-S1  Facet Loading:  Positive bilaterally R>L  SLR:  Negative bilaterally  SIJ TTP:  positive bilaterally  ALBERTO:  positive " bilaterally with compression and distraction      LABS:  Lab Results   Component Value Date    WBC 3.52 (L) 11/15/2024    HGB 9.3 (L) 11/15/2024    HCT 29.8 (L) 11/15/2024    MCV 92 11/15/2024     11/15/2024       CMP  Sodium   Date Value Ref Range Status   11/15/2024 140 136 - 145 mmol/L Final     Potassium   Date Value Ref Range Status   11/15/2024 4.0 3.5 - 5.1 mmol/L Final     Chloride   Date Value Ref Range Status   11/15/2024 105 95 - 110 mmol/L Final     CO2   Date Value Ref Range Status   11/15/2024 26 23 - 29 mmol/L Final     Glucose   Date Value Ref Range Status   11/15/2024 76 70 - 110 mg/dL Final     BUN   Date Value Ref Range Status   11/15/2024 58 (H) 8 - 23 mg/dL Final     Creatinine   Date Value Ref Range Status   11/15/2024 3.9 (H) 0.5 - 1.4 mg/dL Final     Calcium   Date Value Ref Range Status   11/15/2024 8.4 (L) 8.7 - 10.5 mg/dL Final     Total Protein   Date Value Ref Range Status   11/15/2024 6.7 6.0 - 8.4 g/dL Final     Albumin   Date Value Ref Range Status   11/15/2024 3.1 (L) 3.5 - 5.2 g/dL Final     Total Bilirubin   Date Value Ref Range Status   11/15/2024 0.5 0.1 - 1.0 mg/dL Final     Comment:     For infants and newborns, interpretation of results should be based  on gestational age, weight and in agreement with clinical  observations.    Premature Infant recommended reference ranges:  Up to 24 hours.............<8.0 mg/dL  Up to 48 hours............<12.0 mg/dL  3-5 days..................<15.0 mg/dL  6-29 days.................<15.0 mg/dL       Alkaline Phosphatase   Date Value Ref Range Status   11/15/2024 91 40 - 150 U/L Final     AST   Date Value Ref Range Status   11/15/2024 30 10 - 40 U/L Final     ALT   Date Value Ref Range Status   11/15/2024 20 10 - 44 U/L Final     Anion Gap   Date Value Ref Range Status   11/15/2024 9 8 - 16 mmol/L Final     eGFR if    Date Value Ref Range Status   07/14/2022 19 (A) >60 mL/min/1.73 m^2 Final     eGFR if non African  American   Date Value Ref Range Status   07/14/2022 17 (A) >60 mL/min/1.73 m^2 Final     Comment:     Calculation used to obtain the estimated glomerular filtration  rate (eGFR) is the CKD-EPI equation.          Lab Results   Component Value Date    HGBA1C 5.1 07/02/2024             ASSESSMENT:       69 y.o. year old female with lower back pain, consistent with     1. Lumbar spondylosis  Case Request-RAD/Other Procedure Area: Bilateral L4-5 and L5-S1 RFA      2. Thoracic radiculopathy        3. Thoracic spondylosis              Lumbar spondylosis  -     Case Request-RAD/Other Procedure Area: Bilateral L4-5 and L5-S1 RFA    Thoracic radiculopathy    Thoracic spondylosis                 PLAN:   - Interventions:    Schedule right L4/5 + L5/S1 RFA. Pt previously had 75% relief sustained for >6 months.   Explained the risks and benefits of the procedure in detail with the patient today in clinic along with alternative treatment options, and the patient elected to pursue the intervention.    If after procedure has residual left sided pain or continued SIJ pain, can consider Left MBB/RFA vs SIJ injection    02/02/2023:  T11-12 interlaminar epidural steroid injection, 100% relief  -12/7/22:  Right L4-5 and L5-S1 radiofrequency ablation, 75% relief  -8/22/22:  Right sacroiliac joint and right L5-S1 facet injection,   Resolution of right lower extremity pain  - Anticoagulation use:   no no anticoagulation    - Medications:   Continue Tylenol, Cymbalta, and Lyrica as prescribed by primary care    - Therapy:    Continue formal physical therapy    - Imaging:   CT of thoracic spine reviewed.  Significant for right eccentric disc bulge at T11-12 with foraminal stenosis   X-ray Lumbar  reviewed. Significant for grade 1 anterolisthesis of L4 upon L5.  Degenerative disc disease at T11-T12.  As well as pseudo joint formation of right L5 transverse process with superior sacrum     - Consults:   Continue follow-up with cardiology for  previous heart transplant    - Counseled patient regarding the importance of activity modification and physical therapy    - Patient Questions: Answered all of the patient's questions regarding diagnosis, therapy, and treatment    - Follow up visit:  Return to clinic 5 weeks after procedure      The above plan and management options were discussed at length with patient. Patient is in agreement with the above and verbalized understanding.    I discussed the goals of interventional chronic pain management with the patient on today's visit.  I explained the utility of injections for diagnostic and therapeutic purposes.  We discussed a multimodal approach to pain including treating the patient's given worst pain at any given time.  We will use a systematic approach to addressing pain.  We will also adopt a multimodal approach that includes injections, adjuvant medications, physical therapy, at times psychiatry.  There may be a limited role for opioid use intermittently in the treatment of pain, more particularly for acute pain although no one approach can be used as a sole treatment modality.    I emphasized the importance of regular exercise, core strengthening and stretching, diet and weight loss as a cornerstone of long-term pain management.      Corin Lincoln NP  Interventional Pain Management  [facility]    Disclaimer:  This note was prepared using voice recognition system and is likely to have sound alike errors that may have been overlooked even after proof reading.  Please call me with any questions

## 2024-11-19 NOTE — PROGRESS NOTES
C3 nurse spoke with Nadia Damon for a TCC post hospital discharge follow up call. The patient has a scheduled appt with RUDY Ramey (Primary Care) on 11/20/24 at 1040. Message routed to [facility] R[facility] staff after previous visit, requesting visit type change to 'HOSFU.'

## 2024-11-19 NOTE — PROGRESS NOTES
Stat Nitroglycerine SL tablet 0.4 mg administered sublingual 11/19/2024  4:10 PM; pt tolerated well. Reports improvement in chest pain/discomfort. 1615 /84. Stat EKG - 12 lead, completed. Results reviewed and discussed with pt by Dr. Wick. Nitroglycerine SL tablet 0.4 mg administered sublingual 11/19/2024  4:35 PM; pt tolerated well. Reports improvement in chest pain/discomfort. 1650 /82. Pt education per PCP re: S/S of cardiac emergency, reporting to ED, verbalizes understanding.

## 2024-11-19 NOTE — ASSESSMENT & PLAN NOTE
Recommend contact Transplant team regarding recent cardiac issues and experiences w procedures and hospitalization

## 2024-11-19 NOTE — ASSESSMENT & PLAN NOTE
Pt tolerated SL nitro x 2 with resolution of chest pain  Advised if recurrent CP not relieved w SL nitro x 2 to call 911

## 2024-11-19 NOTE — PATIENT INSTRUCTIONS
If you are feeling unwell, we'd like to be the first ones to know here at [facility]! Please give us a call. Same day appointments are our top priority to keep you well and out of the emergency rooms and hospitals. Call [phone] for our direct line. After hours advice is always available. Please call [phone] after hours to speak to the on-call team.      Nitro SL for chest pain can take 1 every 5 min up to 3 doses   If continued chest pain after 2nd dose call [phone]

## 2024-11-19 NOTE — PROGRESS NOTES
Nadia Damon  11/19/2024  [id number]    Elis Wick MD  Patient Care Team:  Elis Wick MD as PCP - General (Internal Medicine)  Miguel Soni Jr., MD as Consulting Physician (Vascular Surgery)  Ike King MD as Consulting Physician (Cardiology)  Courtney Tubbs MD as Consulting Physician (Cardiology)  Carter Crawford MD as Consulting Physician (Nephrology)  Paxton Vasques OD as Consulting Physician (Optometry)  PRANAY Villalobos MD as Obstetrician (Obstetrics)  Parker Mccarthy IV, MD (Urology)  Ike King MD as Consulting Physician (Cardiology)  Jose Roland MD as Consulting Physician (Dermatology)  Prasanth Johnson MD as Consulting Physician (Rheumatology)  Elis Wick MD as Physician (Internal Medicine)  Lexi Bianchi LCSW as  (Hematology and Oncology)  Candace Saleh LMSW as  (Hematology and Oncology)  Kelsie Burk PA-C as Physician Assistant (Hematology and Oncology)  Chelsea Monte as ED Navigator    Ms. Damon is a 68 year old female here for scheduled f/u.     Ms. Damon w h/o heart transplant 1993, on anti-rejection medication. 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 She was treated with 1 cycle of systemic chemotherapy cytoxan and taxotere and udenyca that was discontinued due to toxicity. She has been followed by radiation oncology and completed last radiation treatment 5/14/22. 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       Ms. Damon suffered CVA 7/19/24 with subsequent hospitalization then transfer to [facility] to address on-going dysarthria and R-sided weakness. She also has severe spinal stenosis.      Prior to CVA: Interstim placed R hip 6/11/24 w Urology Dr. Cochran      Other medical issues include depresssion/anxiety/insomnia,  "hypertension, chronic diastolic CHF, and osteoporois for which she is receiving Prolia.     Since LOV w me Ms. Damon was hospitalized 11/13 following adverse reaction during dobutamine stress test w transfer to [facility] 11/14, discharged 11/16.  Sent home from  to  via [employer] which was a traumatic experience    At present c/o HA, nausea, heart pain with "heavy pounding". No radiation of pain or diaphoresis or SOB.    Reports last night chest pain was severe 10/10 with pressure and pounding and radiating to her back for over an hour 1-2am.. Last night also w nausea but no SOB or diaphoresis.    Visit Type: Follow-up    History of Present Illness             PHQ-4 Score: 0       Hosp/ED/Urgent Care:    Recent appointments:     Upcoming appointments:  Future Appointments       Next 10 Appointments       Date Provider Specialty Appt Notes    11/20/2024 Corin Lincoln NP Pain Medicine back pain  confirmed..sw    11/20/2024 Mere Pineda, FNP-C Primary Care *Needs LYFT*; Hospital DC F/U - 11/16/2024 11/25/2024 Guicho Godinez MD Otolaryngology 3 month f/u sinus/Throat    12/12/2024  Lab     12/19/2024 Carter Crawford MD Nephrology EP/RTC/2M/LABS/IDS    12/20/2024  Lab STAT    12/20/2024 Yudith Mendoza NP Hematology and Oncology 2mo/labs/poss retacrit  if hg <10// hm    12/20/2024  Chemotherapy poss retacrit after np//hm    1/16/2025  Lab Dr. Johnson    1/17/2025 Sami Cochran MD Urology 6 month f/u              Displaying the next 10 appointments. This patient has additional appointments scheduled.             The following were reviewed: Active problem list, medication list, allergies, family history, social history, and Health Maintenance.     Medications have been reviewed and reconciled with patient at visit today.    Exam:  Vitals:    11/19/24 1650   BP: (!) 158/82   Pulse:    Temp:      Weight: 67.3 kg (148 lb 4.2 oz)   Body mass index is 28.01 kg/m².    BP Readings from Last 3 " Encounters:   11/19/24 (!) 158/82   11/19/24 (!) 160/84   11/15/24 (!) 154/84        Wt Readings from Last 3 Encounters:   11/19/24 1555 67.3 kg (148 lb 4.2 oz)   11/15/24 0730 64 kg (141 lb)   11/15/24 0404 64.1 kg (141 lb 6.8 oz)   11/14/24 1149 65 kg (143 lb 4.8 oz)   11/13/24 2241 65.9 kg (145 lb 4.5 oz)   11/12/24 1138 67.4 kg (148 lb 9.4 oz)        Physical Exam  Vitals reviewed.   Constitutional:       General: She is in acute distress.      Appearance: Normal appearance.      Comments: Chest pain subsiding after 1st SL nitro - reports mostly resolved following 2nd SL nitro   HENT:      Head: Normocephalic and atraumatic.      Right Ear: External ear normal.      Left Ear: External ear normal.      Nose: Nose normal.      Mouth/Throat:      Mouth: Mucous membranes are moist.      Pharynx: Oropharynx is clear.   Eyes:      General: No scleral icterus.     Extraocular Movements: Extraocular movements intact.      Conjunctiva/sclera: Conjunctivae normal.      Comments: glasses   Neck:      Vascular: No carotid bruit.   Cardiovascular:      Rate and Rhythm: Normal rate.      Heart sounds: No murmur heard.     Comments: Regularly irregular  Borderline tachycardia  Pulmonary:      Effort: Pulmonary effort is normal. No respiratory distress.      Breath sounds: No wheezing, rhonchi or rales.   Abdominal:      General: Bowel sounds are normal.      Palpations: Abdomen is soft.      Tenderness: There is no abdominal tenderness. There is no guarding.   Musculoskeletal:      Right lower leg: Edema present.      Left lower leg: Edema present.   Lymphadenopathy:      Cervical: No cervical adenopathy.   Skin:     General: Skin is warm and dry.      Findings: Bruising present.   Neurological:      Mental Status: She is alert. Mental status is at baseline.      Gait: Gait abnormal.      Comments: tremulous   Psychiatric:         Behavior: Behavior normal.         Thought Content: Thought content normal.      Comments:  anxious        Laboratory Reviewed  Lab Results   Component Value Date    WBC 3.52 (L) 11/15/2024    HGB 9.3 (L) 11/15/2024    HCT 29.8 (L) 11/15/2024     11/15/2024    MCV 92 11/15/2024    CHOL 142 07/20/2024    TRIG 85 07/20/2024    HDL 44 07/20/2024    LDLCALC 81.0 07/20/2024    ALT 20 11/15/2024    AST 30 11/15/2024     11/15/2024    K 4.0 11/15/2024     11/15/2024    CREATININE 3.9 (H) 11/15/2024    BUN 58 (H) 11/15/2024    CO2 26 11/15/2024    MG 2.0 11/15/2024    TSH 10.030 (H) 11/12/2024    FREET4 1.02 11/12/2024    PSA <0.1 05/27/2008    INR 1.0 11/12/2024    HGBA1C 5.1 07/02/2024    CRP 14.4 (H) 08/24/2023     Lab Results   Component Value Date    .3 (H) 10/01/2024    CALCIUM 8.4 (L) 11/15/2024    CAION 1.13 09/05/2018    PHOS 3.7 11/15/2024      Lab Results   Component Value Date    TYQGNKLR82 1373 (H) 06/20/2023     Lab Results   Component Value Date    FOLATE 20.0 06/20/2023      Lab Results   Component Value Date    IRON 80 10/22/2024    TRANSFERRIN 228 10/22/2024    TIBC 337 10/22/2024    FESATURATED 24 10/22/2024      Lab Results   Component Value Date    EGFRNORACEVR 11.8 (A) 11/15/2024    ALBUMIN 3.1 (L) 11/15/2024     (H) 11/12/2024     Lab Results   Component Value Date    AUGOCPGD75AT 39 07/02/2024      EEG 11/14 negative for seizure activity    Cardiac PET Scan Stress w CT 11/14     The myocardial perfusion images show no evidence of ischemia or scar.    The whole heart absolute myocardial perfusion values were elevated at rest, low normal during stress and CFR is mildly reduced in part due to elevated resting flow.    CT attenuation images demonstrate no coronary calcifications and mild diffuse aortic calcifications in the ascending aorta, in the descending aorta and moderate calcfications in the aortic arch.    The gated perfusion images showed an ejection fraction of 56% at rest and 59% during stress. A normal ejection fraction is greater than 47%.    There  is normal wall motion at rest and normal wall motion during stress.    The study's ECG is negative for ischemia.    Assessment:   69 y.o. female with multiple co-morbid illnesses here for continued follow up of medical problems.      The primary encounter diagnosis was Chest pain, unspecified type. Diagnoses of Spinal stenosis of lumbosacral region, Heart transplanted, CKD (chronic kidney disease) stage 5, GFR less than 15 ml/min, Anemia associated with stage 5 chronic renal failure, Abnormal serum thyroid stimulating hormone (TSH) level, and Diarrhea, unspecified type were also pertinent to this visit.      Plan:   1. Chest pain, unspecified type  Assessment & Plan:  Pt tolerated SL nitro x 2 with resolution of chest pain  Advised if recurrent CP not relieved w SL nitro x 2 to call 911    Orders:  -     IN OFFICE EKG 12-LEAD (to Muse)    2. Spinal stenosis of lumbosacral region  Assessment & Plan:  Appt w Interventional Pain Med tomorrow      3. Heart transplanted  Overview:  5/93     Assessment & Plan:  Recommend contact Transplant team regarding recent cardiac issues and experiences w procedures and hospitalization      4. CKD (chronic kidney disease) stage 5, GFR less than 15 ml/min  Assessment & Plan:  Debating whether or not to proceed w HD - f/u w Nephrology as scheduled      5. Anemia associated with stage 5 chronic renal failure  Assessment & Plan:  Cont monitor closely      6. Abnormal serum thyroid stimulating hormone (TSH) level  Assessment & Plan:  Elevated TSH w normal freeT4   No Rx at present - cont monitor closely - consider consult Endo?      7. Diarrhea, unspecified type  Assessment & Plan:  Reports frequent loose green tinged stool and chaffing - discussed w NP for f/u tomorrow       Other orders  -     nitroGLYCERIN (NITROSTAT) 0.4 MG SL tablet; Place 1 tablet (0.4 mg total) under the tongue every 5 (five) minutes as needed for Chest pain. For up to 3 doses. If continued heart pain/pressure  after 2nd dose call 911.  Dispense: 100 tablet; Refill: 0  -     nitroGLYCERIN SL tablet 0.4 mg  -     nitroGLYCERIN SL tablet 0.4 mg         Health Maintenance         Date Due Completion Date    RSV Vaccine (Age 60+ and Pregnant patients) (1 - Risk 60-74 years 1-dose series) Never done ---    COVID-19 Vaccine (7 - 2024-25 season) 09/01/2024 4/27/2023    Mammogram 04/18/2025 4/18/2024    Lipid Panel 07/20/2025 7/20/2024    Colorectal Cancer Screening 02/25/2026 2/25/2021    Hemoglobin A1c (Diabetic Prevention Screening) 07/02/2027 7/2/2024    DEXA Scan 07/03/2027 7/3/2024    TETANUS VACCINE 09/09/2030 9/9/2020            -Patient's lab results were reviewed and discussed with patient  -Treatment options and alternatives were discussed with the patient. Patient expressed understanding. Patient was given the opportunity to ask questions and be an active participant in their medical care. Patient had no further questions or concerns at this time.     Follow up: Follow up in about 1 day (around 11/20/2024) for Follow Up w ONELIA Pineda as scheduled.    After visit summary printed and given to patient upon discharge.  Patient care plan included in After visit summary.    TOTAL TIME evaluating and managing this patient for this encounter was 85 minutes. This time was spent personally by me on some of the following activities: review of patient's past medical history, assessing age-appropriate health maintenance needs, review of any interval history, review and interpretation of lab results, review and interpretation of imaging test results, review and interpretation of cardiology test results, reviewing consulting specialist notes, obtaining history from the patient and family, examination of the patient, medication reconciliation, managing and/or ordering prescription medications, ordering imaging tests, ordering referral to subspecialty provider(s), educating patient and answering their questions about diagnosis, treatment  plan, and goals of treatment, discussing planned follow-up and final documentation of the visit. This time was exclusive of any separately billable procedures for this patient and exclusive of time spent treating any other patients.     This note was generated with the assistance of ambient listening technology. Verbal consent was obtained by the patient and accompanying visitor(s) for the recording of patient appointment to facilitate this note. I attest to having reviewed and edited the generated note for accuracy, though some syntax or spelling errors may persist. Please contact the author of this note for any clarification.

## 2024-11-20 ENCOUNTER — OFFICE VISIT (OUTPATIENT)
Dept: PAIN MEDICINE | Facility: CLINIC | Age: 69
End: 2024-11-20
Payer: MEDICARE

## 2024-11-20 VITALS
BODY MASS INDEX: 27.88 KG/M2 | HEIGHT: 61 IN | RESPIRATION RATE: 17 BRPM | WEIGHT: 147.69 LBS | DIASTOLIC BLOOD PRESSURE: 88 MMHG | SYSTOLIC BLOOD PRESSURE: 142 MMHG

## 2024-11-20 DIAGNOSIS — M54.14 THORACIC RADICULOPATHY: ICD-10-CM

## 2024-11-20 DIAGNOSIS — M47.816 LUMBAR SPONDYLOSIS: Primary | ICD-10-CM

## 2024-11-20 DIAGNOSIS — M47.814 THORACIC SPONDYLOSIS: ICD-10-CM

## 2024-11-20 LAB
OHS QRS DURATION: 154 MS
OHS QTC CALCULATION: 534 MS

## 2024-11-20 PROCEDURE — 3066F NEPHROPATHY DOC TX: CPT | Mod: HCNC,CPTII,S$GLB, | Performed by: NURSE PRACTITIONER

## 2024-11-20 PROCEDURE — 1101F PT FALLS ASSESS-DOCD LE1/YR: CPT | Mod: HCNC,CPTII,S$GLB, | Performed by: NURSE PRACTITIONER

## 2024-11-20 PROCEDURE — 99999 PR PBB SHADOW E&M-EST. PATIENT-LVL II: CPT | Mod: PBBFAC,HCNC,, | Performed by: NURSE PRACTITIONER

## 2024-11-20 PROCEDURE — 3288F FALL RISK ASSESSMENT DOCD: CPT | Mod: HCNC,CPTII,S$GLB, | Performed by: NURSE PRACTITIONER

## 2024-11-20 PROCEDURE — 3077F SYST BP >= 140 MM HG: CPT | Mod: HCNC,CPTII,S$GLB, | Performed by: NURSE PRACTITIONER

## 2024-11-20 PROCEDURE — 3008F BODY MASS INDEX DOCD: CPT | Mod: HCNC,CPTII,S$GLB, | Performed by: NURSE PRACTITIONER

## 2024-11-20 PROCEDURE — 99214 OFFICE O/P EST MOD 30 MIN: CPT | Mod: HCNC,S$GLB,, | Performed by: NURSE PRACTITIONER

## 2024-11-20 PROCEDURE — 1111F DSCHRG MED/CURRENT MED MERGE: CPT | Mod: HCNC,CPTII,S$GLB, | Performed by: NURSE PRACTITIONER

## 2024-11-20 PROCEDURE — 1125F AMNT PAIN NOTED PAIN PRSNT: CPT | Mod: HCNC,CPTII,S$GLB, | Performed by: NURSE PRACTITIONER

## 2024-11-20 PROCEDURE — 3044F HG A1C LEVEL LT 7.0%: CPT | Mod: HCNC,CPTII,S$GLB, | Performed by: NURSE PRACTITIONER

## 2024-11-20 PROCEDURE — 3079F DIAST BP 80-89 MM HG: CPT | Mod: HCNC,CPTII,S$GLB, | Performed by: NURSE PRACTITIONER

## 2024-11-20 PROCEDURE — 1157F ADVNC CARE PLAN IN RCRD: CPT | Mod: HCNC,CPTII,S$GLB, | Performed by: NURSE PRACTITIONER

## 2024-11-21 ENCOUNTER — OFFICE VISIT (OUTPATIENT)
Dept: PRIMARY CARE CLINIC | Facility: CLINIC | Age: 69
End: 2024-11-21
Payer: MEDICARE

## 2024-11-21 ENCOUNTER — TELEPHONE (OUTPATIENT)
Dept: TRANSPLANT | Facility: CLINIC | Age: 69
End: 2024-11-21
Payer: MEDICARE

## 2024-11-21 VITALS
SYSTOLIC BLOOD PRESSURE: 142 MMHG | OXYGEN SATURATION: 99 % | TEMPERATURE: 98 F | HEART RATE: 90 BPM | WEIGHT: 150.81 LBS | DIASTOLIC BLOOD PRESSURE: 70 MMHG | HEIGHT: 61 IN | BODY MASS INDEX: 28.47 KG/M2

## 2024-11-21 DIAGNOSIS — Z94.1 HEART TRANSPLANTED: Chronic | ICD-10-CM

## 2024-11-21 DIAGNOSIS — K43.9 VENTRAL HERNIA WITHOUT OBSTRUCTION OR GANGRENE: Chronic | ICD-10-CM

## 2024-11-21 DIAGNOSIS — I10 ESSENTIAL HYPERTENSION: Primary | Chronic | ICD-10-CM

## 2024-11-21 DIAGNOSIS — N18.5 CKD (CHRONIC KIDNEY DISEASE) STAGE 5, GFR LESS THAN 15 ML/MIN: Chronic | ICD-10-CM

## 2024-11-21 DIAGNOSIS — D63.1 ANEMIA ASSOCIATED WITH STAGE 5 CHRONIC RENAL FAILURE: Chronic | ICD-10-CM

## 2024-11-21 DIAGNOSIS — N18.5 ANEMIA ASSOCIATED WITH STAGE 5 CHRONIC RENAL FAILURE: Chronic | ICD-10-CM

## 2024-11-21 DIAGNOSIS — M79.7 FIBROMYALGIA: ICD-10-CM

## 2024-11-21 DIAGNOSIS — I50.32 CHRONIC DIASTOLIC (CONGESTIVE) HEART FAILURE: Chronic | ICD-10-CM

## 2024-11-21 DIAGNOSIS — R07.9 CHEST PAIN, UNSPECIFIED TYPE: Chronic | ICD-10-CM

## 2024-11-21 PROCEDURE — 99999 PR PBB SHADOW E&M-EST. PATIENT-LVL V: CPT | Mod: PBBFAC,HCNC,,

## 2024-11-21 NOTE — ASSESSMENT & PLAN NOTE
Discuss how to take Nitro SL every 5 minutes up to three times. If no CP relief go to nearest ER.

## 2024-11-21 NOTE — PATIENT INSTRUCTIONS
If you are feeling unwell, we'd like to be the first ones to know here at [facility]! Please give us a call. Same day appointments are our top priority to keep you well and out of the emergency rooms and hospitals. Call [phone] for our direct line. After hours advice is always available. Please call [phone] after hours to speak to the on-call team.      Schedule follow up w/ cardiologist next available.     Resume Lasix as previous prescribed.

## 2024-11-21 NOTE — PROGRESS NOTES
Nadia Damon  11/22/2024  [id number]    Elis Wick MD  Patient Care Team:  Elis Wick MD as PCP - General (Internal Medicine)  Miguel Soni Jr., MD as Consulting Physician (Vascular Surgery)  Ike King MD as Consulting Physician (Cardiology)  Courtney Tubbs MD as Consulting Physician (Cardiology)  Carter Crawford MD as Consulting Physician (Nephrology)  Paxton Vasques OD as Consulting Physician (Optometry)  PRANAY Villalobos MD as Obstetrician (Obstetrics)  Parker Mccarthy IV, MD (Urology)  Ike King MD as Consulting Physician (Cardiology)  Jose Roland MD as Consulting Physician (Dermatology)  Prasanth Johnson MD as Consulting Physician (Rheumatology)  Elis Wick MD as Physician (Internal Medicine)  Lexi Bianchi LCSW as  (Hematology and Oncology)  Candace Saleh LMSW as  (Hematology and Oncology)  Kelsie Burk PA-C as Physician Assistant (Hematology and Oncology)  Chelsea Monte as ED Navigator    Visit Type: Follow-up    Chief Complaint:  Chief Complaint   Patient presents with    Follow-up     Pt is here fro follow up on back pain chest pain.        History of Present Illness: Follow-up  Pertinent negatives include no abdominal pain, chest pain, chills, congestion, coughing, fever, headaches, myalgias, nausea, rash, sore throat, vomiting or weakness.          CHIEF COMPLAINT:  Nadia presents today for follow-up after recent hospitalization.    RECENT HOSPITALIZATION:  She recently underwent a stress test administered intravenously due to back problems and an EEG. She expresses confusion about discharge instructions, stating she was not clearly informed about medication changes or test results. She denies receiving clear guidance on post-discharge medication management.    CARDIOVASCULAR:  She is a heart transplant recipient, approximately 32 years post-transplant. She reports a recent episode of chest pain on yesterday,  "for which she used nitroglycerin as prescribed. She denies any current chest discomfort or pain. She acknowledges the importance of following up with her cardiologist, Dr. Hunter Lopez.    RENAL:  She has stage 4 chronic kidney disease. Her nephrologist, Dr. Wright, has recommended dialysis, but she expresses a desire to explore alternative options. She is not currently on dialysis but is aware it may be necessary in the future.    NEUROLOGICAL:  She experienced a stroke in July, followed by post-stroke rehabilitation at [facility]. Initial symptoms included inability to walk, severe stuttering, loss of writing ability, weakness on one side of her body (particularly affecting her arm), and leg dragging. She received a CT scan instead of an MRI due to contraindications. Her deficits from stroke have recovered following therapy.     MUSCULOSKELETAL:  She reports chronic back pain, described as "bone on bone," attributed to work, age, and medical history. The pain significantly impacts her mobility. Post-stroke, she experienced numbness in her leg affecting her gait. She currently uses a cane for ambulation and reports ongoing fatigue in the affected leg, causing unexpected tilting while walking. She has an upcoming spinal surgery scheduled for December 4th and is being referred to pain management.    GASTROINTESTINAL:  She reports recent bowel issues, including loose, green stools and nausea. These issues are improving with daily use of Miralax. She also mentions having a hernia that causes discomfort when pressed, but no other issues.          Recent Fall:  []Yes  [x]No  Activity:  []Vigorous []Moderate [x]Sedentary  Appetite:  []Good  [x]Fair  []Poor  Mood: []Stable [x]Anxious []Depressed   Insomnia: []Yes  [x]No    PHQ-4 Score: 0     Hosp/ED/Urgent Care: 11/13/24--MD Camilo (seizure-like activity; CP)  11/12/24--MD Kimo (Med/Tele; CP, Cardiac stress test, MI)    Recent appointments:   11/19/24 f/u " "for hospital visit; c/o "heavy pounding, heart pain", HA, Nausea 10/16/24 MD [facility] F/U    Since LOV 10/16/24 w/ MD Delano Ms. Damon was hospitalized 11/13 following adverse reaction during dobutamine stress test w transfer to [facility] 11/14, discharged 11/16.  Sent home from  to  via [employer] which was a traumatic experience    Upcoming appointments:  Future Appointments       Next 10 Appointments       Date Provider Specialty Appt Notes    11/25/2024 Guicho Godinez MD Otolaryngology 3 month f/u sinus/Throat    12[facility]5/2024 [facility]    12/11/2024 Elis Wick MD Primary Care Two week f/u    12/12/2024  Lab     12/19/2024 Carter Crawford MD Nephrology EP/RTC/2M/LABS/IDS    12/20/2024  Lab STAT    12/20/2024 Yudith Mendoza NP Hematology and Oncology 2mo/labs/poss retacrit  if hg <10// hm    12/20/2024  Chemotherapy poss retacrit after np//hm    1/8/2025 Corin Lincoln NP Pain Medicine 5 wk f/u    1/16/2025  Lab Dr. Johnson              Displaying the next 10 appointments. This patient has additional appointments scheduled.             The following were reviewed: Active problem list, medication list, allergies, family history, social history, and Health Maintenance.     Medications have been reviewed and reconciled with patient at visit today.    Review of Systems   Constitutional:  Negative for chills and fever.   HENT:  Negative for congestion, ear pain, sinus pain and sore throat.    Eyes:  Negative for blurred vision and pain.   Respiratory:  Negative for cough and shortness of breath.    Cardiovascular:  Negative for chest pain and leg swelling.   Gastrointestinal:  Negative for abdominal pain, nausea and vomiting.   Genitourinary:  Negative for dysuria.   Musculoskeletal:  Negative for back pain, falls and myalgias.   Skin:  Negative for itching and rash.   Neurological:  Negative for dizziness, weakness and headaches.   Psychiatric/Behavioral:  " Negative for suicidal ideas. The patient does not have insomnia.         See HPI above    Exam:  Vitals:    11/21/24 1030   BP: (!) 142/70   Pulse: 90   Temp: 97.6 °F (36.4 °C)     Weight: 68.4 kg (150 lb 12.7 oz)   Body mass index is 28.49 kg/m².    BP Readings from Last 3 Encounters:   11/21/24 (!) 142/70   11/20/24 (!) 142/88   11/19/24 (!) 158/82        Wt Readings from Last 3 Encounters:   11/21/24 1030 68.4 kg (150 lb 12.7 oz)   11/20/24 0925 67 kg (147 lb 11.3 oz)   11/19/24 1555 67.3 kg (148 lb 4.2 oz)        Physical Exam  Constitutional:       Appearance: Normal appearance. She is normal weight.   HENT:      Head: Normocephalic.      Right Ear: Tympanic membrane, ear canal and external ear normal.      Left Ear: Tympanic membrane, ear canal and external ear normal.      Nose: Nose normal.      Mouth/Throat:      Mouth: Mucous membranes are moist.      Pharynx: Oropharynx is clear.   Eyes:      Extraocular Movements: Extraocular movements intact.      Conjunctiva/sclera: Conjunctivae normal.      Pupils: Pupils are equal, round, and reactive to light.   Cardiovascular:      Rate and Rhythm: Normal rate. Rhythm regularly irregular.      Pulses: Normal pulses.      Heart sounds: Normal heart sounds.   Pulmonary:      Effort: Pulmonary effort is normal.      Breath sounds: Normal breath sounds.   Abdominal:      General: Bowel sounds are normal.      Palpations: Abdomen is soft.      Hernia: A hernia is present. Hernia is present in the ventral area.   Musculoskeletal:         General: Normal range of motion.      Cervical back: Normal range of motion and neck supple.   Skin:     General: Skin is warm and dry.      Capillary Refill: Capillary refill takes less than 2 seconds.      Findings: Bruising (bilateral forearms) present.   Neurological:      General: No focal deficit present.      Mental Status: She is alert and oriented to person, place, and time. Mental status is at baseline.      GCS: GCS eye  subscore is 4. GCS verbal subscore is 5. GCS motor subscore is 6.      Coordination: Coordination is intact.      Gait: Gait abnormal (use a cane w/ ambulation).   Psychiatric:         Mood and Affect: Mood is anxious.         Behavior: Behavior normal.         Thought Content: Thought content normal.         Judgment: Judgment normal.          Laboratory Reviewed  Lab Results   Component Value Date    WBC 3.52 (L) 11/15/2024    HGB 9.3 (L) 11/15/2024    HCT 29.8 (L) 11/15/2024     11/15/2024    MCV 92 11/15/2024    CHOL 142 07/20/2024    TRIG 85 07/20/2024    HDL 44 07/20/2024    LDLCALC 81.0 07/20/2024    ALT 20 11/15/2024    AST 30 11/15/2024     11/15/2024    K 4.0 11/15/2024     11/15/2024    CREATININE 3.9 (H) 11/15/2024    BUN 58 (H) 11/15/2024    CO2 26 11/15/2024    MG 2.0 11/15/2024    TSH 10.030 (H) 11/12/2024    FREET4 1.02 11/12/2024    PSA <0.1 05/27/2008    INR 1.0 11/12/2024    HGBA1C 5.1 07/02/2024    CRP 14.4 (H) 08/24/2023     Lab Results   Component Value Date    .3 (H) 10/01/2024    CALCIUM 8.4 (L) 11/15/2024    CAION 1.13 09/05/2018    PHOS 3.7 11/15/2024      Lab Results   Component Value Date    FMWMYVOS20 1373 (H) 06/20/2023     Lab Results   Component Value Date    FOLATE 20.0 06/20/2023      Lab Results   Component Value Date    IRON 80 10/22/2024    TRANSFERRIN 228 10/22/2024    TIBC 337 10/22/2024    FESATURATED 24 10/22/2024      Lab Results   Component Value Date    EGFRNORACEVR 11.8 (A) 11/15/2024    ALBUMIN 3.1 (L) 11/15/2024     (H) 11/12/2024     Lab Results   Component Value Date    JLBIAIOC53WK 39 07/02/2024          Assessment:   69 y.o. female with multiple co-morbid illnesses here for continued follow up of medical problems.      The primary encounter diagnosis was Essential hypertension. Diagnoses of Heart transplanted, Fibromyalgia, Anemia associated with stage 5 chronic renal failure, CKD (chronic kidney disease) stage 5, GFR less than 15  ml/min, Chronic diastolic (congestive) heart failure, Chest pain, unspecified type, and Ventral hernia without obstruction or gangrene were also pertinent to this visit.      Plan:   1. Essential hypertension  Assessment & Plan:  Controlled this visit  Continue HTN management   Continue daily activity, low sodium diet, weight loss efforts  BP Readings from Last 3 Encounters:   11/21/24 (!) 142/70   11/20/24 (!) 142/88   11/19/24 (!) 158/82           2. Heart transplanted  Overview:  5/93     Assessment & Plan:  Recommend f/u w/ Transplant team to further discuss recent hospitalizations & recurrent cardiac symptoms       3. Fibromyalgia  Assessment & Plan:  Continue current plan for pain as needed.   Rx Lyrica      4. Anemia associated with stage 5 chronic renal failure  Assessment & Plan:  Closely monitor   Component      Latest Ref Rng 10/22/2024 11/12/2024 11/13/2024 11/14/2024 11/15/2024   RBC      4.00 - 5.40 M/uL 3.24 (L)  3.49 (L)  3.49 (L)  3.48 (L)  3.23 (L)    Hemoglobin      12.0 - 16.0 g/dL 9.4 (L)  10.1 (L)  9.9 (L)  9.9 (L)  9.3 (L)    Hematocrit      37.0 - 48.5 % 29.2 (L)  30.9 (L)  31.1 (L)  31.7 (L)  29.8 (L)         Component      Latest Ref Rng 10/22/2024 11/12/2024 11/13/2024 11/14/2024 11/15/2024   eGFR      >60 mL/min/1.73 m^2 12 !  11 !  9 !  11.1 !  11.8 !             5. CKD (chronic kidney disease) stage 5, GFR less than 15 ml/min  Assessment & Plan:  F/U w/ nephrology.   States refusing dialysis   Taking Veltassa daily         6. Chronic diastolic (congestive) heart failure  Overview:      Echocardiogram 5/28/24   The estimated pulmonary artery systolic pressure is 53 mmHg.   Left Ventricle: The left ventricle is normal in size. Normal wall thickness. There is mild asymmetric hypertrophy. Normal wall motion. Septal flattening in systole consistent with right ventricular pressure overload. There is normal systolic function with a visually estimated ejection fraction of 55 - 60%. There is  diastolic dysfunction but grade cannot be determined.    Assessment & Plan:  Resume Lasix as previously prescribed.   F/U w/ Transplant MD & Cardiologist     Patient has Diastolic (HFpEF) heart failure that is Chronic. On presentation their CHF was well compensated. Most recent BNP and echo results are listed below.  BNP 0 - 99 pg/mL 764 High  491 High   High   High  CM     Current Heart Failure Medications   carvediloL tablet 3.125 mg, 2 times daily with meals, Oral   Lasix 40 mg daily     Plan  - Monitor strict I&Os and daily weights.    - Place on telemetry  - Place on fluid restriction of 1.5 L.   - Cardiology has been consulted  - The patient's volume status is stable - BNP elevated but also renal function uptrending.            7. Chest pain, unspecified type  Assessment & Plan:  Discuss how to take Nitro SL every 5 minutes up to three times. If no CP relief go to nearest ER.       8. Ventral hernia without obstruction or gangrene  Overview:  CT abd Jan 2023           Health Maintenance         Date Due Completion Date    RSV Vaccine (Age 60+ and Pregnant patients) (1 - Risk 60-74 years 1-dose series) Never done ---    COVID-19 Vaccine (7 - 2024-25 season) 09/01/2024 4/27/2023    Mammogram 04/18/2025 4/18/2024    Lipid Panel 07/20/2025 7/20/2024    Colorectal Cancer Screening 02/25/2026 2/25/2021    Hemoglobin A1c (Diabetic Prevention Screening) 07/02/2027 7/2/2024    DEXA Scan 07/03/2027 7/3/2024    TETANUS VACCINE 09/09/2030 9/9/2020            -Patient's lab results were reviewed and discussed with patient  -Treatment options and alternatives were discussed with the patient. Patient expressed understanding. Patient was given the opportunity to ask questions and be an active participant in their medical care. Patient had no further questions or concerns at this time.       Follow up: Follow up in about 2 weeks (around 12/5/2024).    Care Plan/Goals: Reviewed N/A   Goals    None         After  visit summary printed and given to patient upon discharge.  Patient goals and care plan are included in After visit summary.    TOTAL TIME evaluating and managing this patient for this encounter was  40 minutes. This time was spent personally by me on some of the following activities: review of patient's past medical history, assessing age-appropriate health maintenance needs, review of any interval history, review and interpretation of lab results, review and interpretation of imaging test results, review and interpretation of cardiology test results, reviewing consulting specialist notes, obtaining history from the patient and family, examination of the patient, medication reconciliation, managing and/or ordering prescription medications, ordering imaging tests, ordering referral to subspecialty provider(s), educating patient and answering their questions about diagnosis, treatment plan, and goals of treatment, discussing planned follow-up and final documentation of the visit. This time was exclusive of any separately billable procedures for this patient and exclusive of time spent treating any other patients.     This note was generated with the assistance of ambient listening technology. Verbal consent was obtained by the patient and accompanying visitor(s) for the recording of patient appointment to facilitate this note. I attest to having reviewed and edited the generated note for accuracy, though some syntax or spelling errors may persist. Please contact the author of this note for any clarification.              ROSSANA Bazan, FNP-C  [facility] [address] [place] [address]   [phone]   [phone] fax

## 2024-11-21 NOTE — ASSESSMENT & PLAN NOTE
Resume Lasix as previously prescribed.   F/U w/ Transplant MD & Cardiologist     Patient has Diastolic (HFpEF) heart failure that is Chronic. On presentation their CHF was well compensated. Most recent BNP and echo results are listed below.  BNP 0 - 99 pg/mL 764 High  491 High   High   High  CM     Current Heart Failure Medications   carvediloL tablet 3.125 mg, 2 times daily with meals, Oral   Lasix 40 mg daily     Plan  - Monitor strict I&Os and daily weights.    - Place on telemetry  - Place on fluid restriction of 1.5 L.   - Cardiology has been consulted  - The patient's volume status is stable - BNP elevated but also renal function uptrending.

## 2024-11-22 NOTE — TELEPHONE ENCOUNTER
Called and left a message on pt's phone, asked for her to please call back and left my direct phone number [phone].

## 2024-11-22 NOTE — ASSESSMENT & PLAN NOTE
Closely monitor   Component      Latest Ref Rng 10/22/2024 11/12/2024 11/13/2024 11/14/2024 11/15/2024   RBC      4.00 - 5.40 M/uL 3.24 (L)  3.49 (L)  3.49 (L)  3.48 (L)  3.23 (L)    Hemoglobin      12.0 - 16.0 g/dL 9.4 (L)  10.1 (L)  9.9 (L)  9.9 (L)  9.3 (L)    Hematocrit      37.0 - 48.5 % 29.2 (L)  30.9 (L)  31.1 (L)  31.7 (L)  29.8 (L)         Component      Latest Ref Rng 10/22/2024 11/12/2024 11/13/2024 11/14/2024 11/15/2024   eGFR      >60 mL/min/1.73 m^2 12 !  11 !  9 !  11.1 !  11.8 !

## 2024-11-22 NOTE — ASSESSMENT & PLAN NOTE
Recommend f/u w/ Transplant team to further discuss recent hospitalizations & recurrent cardiac symptoms

## 2024-11-22 NOTE — ASSESSMENT & PLAN NOTE
Controlled this visit  Continue HTN management   Continue daily activity, low sodium diet, weight loss efforts  BP Readings from Last 3 Encounters:   11/21/24 (!) 142/70   11/20/24 (!) 142/88   11/19/24 (!) 158/82

## 2024-11-25 ENCOUNTER — OFFICE VISIT (OUTPATIENT)
Dept: OTOLARYNGOLOGY | Facility: CLINIC | Age: 69
End: 2024-11-25
Payer: MEDICARE

## 2024-11-25 ENCOUNTER — TELEPHONE (OUTPATIENT)
Dept: PRIMARY CARE CLINIC | Facility: CLINIC | Age: 69
End: 2024-11-25
Payer: MEDICARE

## 2024-11-25 VITALS — WEIGHT: 150.81 LBS | BODY MASS INDEX: 28.47 KG/M2 | HEIGHT: 61 IN

## 2024-11-25 DIAGNOSIS — J38.00 VOCAL CORD PARESIS: Primary | ICD-10-CM

## 2024-11-25 DIAGNOSIS — I63.9 CEREBROVASCULAR ACCIDENT (CVA), UNSPECIFIED MECHANISM: ICD-10-CM

## 2024-11-25 PROCEDURE — 3288F FALL RISK ASSESSMENT DOCD: CPT | Mod: HCNC,CPTII,S$GLB, | Performed by: STUDENT IN AN ORGANIZED HEALTH CARE EDUCATION/TRAINING PROGRAM

## 2024-11-25 PROCEDURE — 99999 PR PBB SHADOW E&M-EST. PATIENT-LVL III: CPT | Mod: PBBFAC,HCNC,, | Performed by: STUDENT IN AN ORGANIZED HEALTH CARE EDUCATION/TRAINING PROGRAM

## 2024-11-25 PROCEDURE — 31575 DIAGNOSTIC LARYNGOSCOPY: CPT | Mod: HCNC,S$GLB,, | Performed by: STUDENT IN AN ORGANIZED HEALTH CARE EDUCATION/TRAINING PROGRAM

## 2024-11-25 PROCEDURE — 1111F DSCHRG MED/CURRENT MED MERGE: CPT | Mod: HCNC,CPTII,S$GLB, | Performed by: STUDENT IN AN ORGANIZED HEALTH CARE EDUCATION/TRAINING PROGRAM

## 2024-11-25 PROCEDURE — 3044F HG A1C LEVEL LT 7.0%: CPT | Mod: HCNC,CPTII,S$GLB, | Performed by: STUDENT IN AN ORGANIZED HEALTH CARE EDUCATION/TRAINING PROGRAM

## 2024-11-25 PROCEDURE — 3066F NEPHROPATHY DOC TX: CPT | Mod: HCNC,CPTII,S$GLB, | Performed by: STUDENT IN AN ORGANIZED HEALTH CARE EDUCATION/TRAINING PROGRAM

## 2024-11-25 PROCEDURE — 1101F PT FALLS ASSESS-DOCD LE1/YR: CPT | Mod: HCNC,CPTII,S$GLB, | Performed by: STUDENT IN AN ORGANIZED HEALTH CARE EDUCATION/TRAINING PROGRAM

## 2024-11-25 PROCEDURE — 3008F BODY MASS INDEX DOCD: CPT | Mod: HCNC,CPTII,S$GLB, | Performed by: STUDENT IN AN ORGANIZED HEALTH CARE EDUCATION/TRAINING PROGRAM

## 2024-11-25 PROCEDURE — 1157F ADVNC CARE PLAN IN RCRD: CPT | Mod: HCNC,CPTII,S$GLB, | Performed by: STUDENT IN AN ORGANIZED HEALTH CARE EDUCATION/TRAINING PROGRAM

## 2024-11-25 PROCEDURE — 99213 OFFICE O/P EST LOW 20 MIN: CPT | Mod: 25,HCNC,S$GLB, | Performed by: STUDENT IN AN ORGANIZED HEALTH CARE EDUCATION/TRAINING PROGRAM

## 2024-11-25 PROCEDURE — 1159F MED LIST DOCD IN RCRD: CPT | Mod: HCNC,CPTII,S$GLB, | Performed by: STUDENT IN AN ORGANIZED HEALTH CARE EDUCATION/TRAINING PROGRAM

## 2024-11-25 PROCEDURE — 1126F AMNT PAIN NOTED NONE PRSNT: CPT | Mod: HCNC,CPTII,S$GLB, | Performed by: STUDENT IN AN ORGANIZED HEALTH CARE EDUCATION/TRAINING PROGRAM

## 2024-11-25 NOTE — PROGRESS NOTES
Chief complaint:    Chief Complaint   Patient presents with    Follow-up     Pt is f/u sinus throat           Referring Provider:  No referring provider defined for this encounter.      History of present illness, 8/23/24:     Ms. Damon is a 69 y.o. presenting for evaluation of dysphonia since stroke on July 19    Right sided hemiparesis after the stroke  Associated dysphonia and dysphagia started after the stroke.   The voice has improved, but not back to baseline. Has issues with both dysarthria and voice giving out  and weak and raspy  She is doing voice therapy     Update 11/25/24  Voice has much improved. Stuttering resolved, projecting well. Some raspiness.   Dysphagia resolved. Occasionally feels soreness in right throat    History      Past Medical History:   Past Medical History:   Diagnosis Date    Abdominal wall hernia     CT Renal 6/11/2018---Small fat containing superior ventral abdominal wall hernia at the epicardial pacing lead site.    Abnormal mammogram 10/12/2021    Acute cystitis without hematuria 05/10/2022    Acute ischemic right middle cerebral artery (MCA) stroke 07/20/2024    GRACE (acute kidney injury) 11/22/2021    Anxiety     Aphasia 07/19/2024    Arthritis     ZEN HIPS    Breast cancer in female 08/2021    LEFT BREAST    Breast mass, right 11/13/2024    RIGHT BREAST MASS (Problem)      Bronchitis 08/18/2016    Never smoked      C. difficile colitis 11/29/2021    Cellulitis of axilla, left 12/23/2021    Chronic diastolic heart failure 12/16/2021    Chronic kidney disease     stage 4, GFR 15-29 ml/min    Chronic midline low back pain without sciatica 06/18/2018    CKD (chronic kidney disease) stage 4, GFR 15-29 ml/min 04/20/2016    US Retro   5/16/2018---Mild cortical thinning and increased cortical echogenicity.  Findings can be seen with medical renal disease.  8/31/216--- Echogenic kidneys with diffuse cortical thinning suggesting  medical renal disease. 2 complex right renal cortical  cysts.      Closed nondisplaced fracture of distal phalanx of left great toe with routine healing 10/22/2018    Coronary artery disease 1993    heart transplant    COVID-19 in immunocompromised patient 02/26/2024    Cystitis 05/10/2022    Depression     Encounter for blood transfusion     Encounter for palliative care 10/14/2024    Fibromyalgia     on Lyrica    Heart failure     native heart cardiomyopathy    Heart transplanted 1993    due to cardiomyopathy    History of hyperparathyroidism; Hyperparathyroidism, secondary renal     PT DENIES    Hypertension     Immune disorder     anti rejection meds    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    Impaired mobility 07/28/2022    Iron deficiency anemia 08/15/2017    Kidney stones     passed per pt    Obesity     Obesity (BMI 30.0-34.9) 07/22/2019    Other osteoporosis without current pathological fracture 08/30/2019    Other pancytopenia 05/06/2024    Parotitis, acute 09/19/2023    Pharyngitis 01/09/2019    Pneumonia due to infectious organism 03/14/2024    PONV (postoperative nausea and vomiting)     Severe sepsis 11/22/2021    Shingles 2003 approx    left leg    Stage 4 chronic kidney disease 11/22/2021    Subclinical hypothyroidism 06/16/2023    Thrombocytopenia, unspecified 11/29/2021    Trouble in sleeping     Unresponsiveness 11/13/2024    Urinary incontinence          Past Surgical History:  Past Surgical History:   Procedure Laterality Date    bladder implant Right 06/11/2024    BLADDER SURGERY  2015 approx    mesh - Dr Everett then 2nd reconstructive sx Dr Onofre    BREAST BIOPSY Bilateral     NEGATIVE    BREAST BIOPSY Right 10/31/2022    benign    BREAST LUMPECTOMY Left 2021    BREAST SURGERY Left 09/28/2015    Bx - benign    BREAST SURGERY Right 12/2015    Bx benign    CARDIAC PACEMAKER REMOVAL Left 06/26/2014    Pacer defirillator removed. Put in 1993 aat time of heart transplant    CARPAL TUNNEL RELEASE Left 03/03/2015    Dr. Hall     COLONOSCOPY N/A 02/25/2021    Procedure: COLONOSCOPY;  Surgeon: Freida Ramirez MD;  Location: [facility] ENDO;  Service: Endoscopy;  Laterality: N/A;    CYSTOCELE REPAIR      Twice with mesh removal    EPIDURAL STEROID INJECTION INTO CERVICAL SPINE N/A 02/02/2023    Procedure: T11/T12 IL HELLEN;  Surgeon: Jassi Pierre MD;  [facility];  Service: Pain Management;  Laterality: N/A;    EPIDURAL STEROID INJECTION INTO CERVICAL SPINE N/A 9/16/2024    Procedure: T11/T12 IL HELLEN;  Surgeon: Jassi Pierre MD;  Location: [facility] PAIN MGT;  Service: Pain Management;  Laterality: N/A;    HEART TRANSPLANT  1993    HERNIA REPAIR Right 1971 approx    Inguinal    HYSTERECTOMY  1983    vag hyst /LSO     IMPLANTATION OF PERMANENT SACRAL NERVE STIMULATOR N/A 06/11/2024    Procedure: INSERTION, NEUROSTIMULATOR, PERMANENT, SACRAL;  Surgeon: Sami Cochran MD;  Location: [facility] OR;  Service: Urology;  Laterality: N/A;    INCISION AND DRAINAGE OF ABSCESS Left 12/24/2021    Procedure: INCISION AND DRAINAGE, ABSCESS;  Surgeon: Joseph Longo MD;  Location: [facility] OR;  Service: General;  Laterality: Left;    INJECTION OF ANESTHETIC AGENT AROUND MEDIAL BRANCH NERVES INNERVATING LUMBAR FACET JOINT Right 10/19/2022    Procedure: Right L4/L5 and L5/S1 MBB;  Surgeon: Jassi Pierre MD;  Location: [facility] PAIN MGT;  Service: Pain Management;  Laterality: Right;    INJECTION OF ANESTHETIC AGENT AROUND MEDIAL BRANCH NERVES INNERVATING LUMBAR FACET JOINT Right 11/09/2022    Procedure: Right L4/L5 and L5/S1 MBB;  Surgeon: Jassi Pierre MD;  [facility];  Service: Pain Management;  Laterality: Right;    INJECTION OF ANESTHETIC AGENT INTO SACROILIAC JOINT Right 08/22/2022    Procedure: Right SIJ Injection Right L5/S1 Facte Injection;  Surgeon: Jassi Pierre MD;  Location: [facility] PAIN MGT;  Service: Pain Management;  Laterality: Right;    INSERTION OF TUNNELED CENTRAL VENOUS CATHETER (CVC) WITH SUBCUTANEOUS PORT N/A  11/09/2021    Procedure: [id number];  Surgeon: Christoph Douglas MD;  Location: [facility] OR;  Service: General;  Laterality: N/A;    RADIOFREQUENCY THERMOCOAGULATION Right 12/07/2022    Procedure: Right L4/L5 and L5/S1 Lumbar RFA;  Surgeon: Jassi Pierre MD;  Location: [facility] PAIN MGT;  Service: Pain Management;  Laterality: Right;    REMOVAL OF VASCULAR ACCESS PORT      ROBOT-ASSISTED LAPAROSCOPIC ABDOMINAL SACROCOLPOPEXY N/A 08/10/2023    Procedure: ROBOTIC SACROCOLPOPEXY, ABDOMEN;  Surgeon: PRANAY Villalobos MD;  Location: [facility] OR;  Service: OB/GYN;  Laterality: N/A;    ROBOT-ASSISTED LAPAROSCOPIC OOPHORECTOMY Right 08/10/2023    Procedure: ROBOTIC OOPHORECTOMY;  Surgeon: PRANAY Villalobos MD;  Location: [facility] OR;  Service: OB/GYN;  Laterality: Right;    SENTINEL LYMPH NODE BIOPSY Left 10/12/2021    Procedure: BIOPSY, LYMPH NODE, SENTINEL;  Surgeon: Christoph Douglas MD;  Location: [facility] OR;  Service: General;  Laterality: Left;    TOE SURGERY      XI ROBOTIC URETHROPEXY N/A 08/10/2023    Procedure: XI ROBOTIC URETHROPEXY;  Surgeon: PRANAY Villalobos MD;  Location: [facility] OR;  Service: OB/GYN;  Laterality: N/A;         Medications: Medication list reviewed. She  has a current medication list which includes the following prescription(s): aspirin, calcitriol, carvedilol, furosemide, nitroglycerin, ondansetron, pantoprazole, patiromer calcium sorbitex, polyethylene glycol, pregabalin, sodium bicarbonate, temazepam, tizanidine, vitamin e, zolpidem, and cyclosporine modified (neoral).     Allergies:   Review of patient's allergies indicates:   Allergen Reactions    Dobutamine Other (See Comments)     Severe Left sided chest pain after dosage administered for Dobutamine Stress Test; itching    Lisinopril Swelling and Rash    Nitro Shortness Of Breath     Audible wheezing - after Nitrolgycerin Spray administered x 2; itching    Hydrocodone-acetaminophen Nausea Only    Augmentin [amoxicillin-pot clavulanate]  "Diarrhea    Zyvox [linezolid] Nausea And Vomiting         Family history: family history includes Breast cancer in her maternal grandmother and mother; Cancer (age of onset: 38) in her mother; Cataracts in her cousin; Heart disease in her maternal grandmother; Hypertension in her son.         Social History          Alcohol use:  reports no history of alcohol use.            Tobacco:  reports that she has never smoked. She has never been exposed to tobacco smoke. She has never used smokeless tobacco.         Physical Examination      Vitals: Height 5' 1.2" (1.554 m), weight 68.4 kg (150 lb 12.7 oz), last menstrual period 06/20/1983.      General: Well developed, well nourished, well hydrated.     Voice: minimal dysphonia (strong, somewhat raspy) and resolved dysarthria    Head/Face: Normocephalic, atraumatic. No scars or lesions. Facial musculature equal.     Eyes: No scleral icterus or conjunctival hemorrhage. EOMI. PERRLA.     Ears:     Right ear: No gross deformity. EAC is clear of debris and erythema. TM are intact with a pneumatized middle ear. No signs of retraction, fluid or infection.      Left ear: No gross deformity. EAC is clear of debris and erythema. TM are intact with a pneumatized middle ear. No signs of retraction, fluid or infection.      Nose: No gross deformity or lesions. No purulent discharge. No significant NSD.      Mouth/Oropharynx: Lips without any lesions. No mucosal lesions within the oropharynx. No tonsillar exudate or lesions. Pharyngeal walls symmetrical. Uvula midline. Tongue midline without lesions.     Neck: Trachea midline. No masses. No thyromegaly or nodules palpated.     Lymphatic: No lymphadenopathy in the neck.     Extremities: No cyanosis. Warm and well-perfused.     Skin: No scars or lesions on face or neck.          Data reviewed      Review of records:      I reviewed records from the referring provider's office visits, including the history, workup, and/or treatment of " this problem thus far.        Procedures:    Procedure -Transnasal fiberoptic laryngoscopy     Surgeon: Guicho Godinez M.D. .      Anesthesia: topical 0.05% oxymetazoline with 4% lidocaine      Complications: None.     Description of Procedure: With the patient in the sitting position, topical lidocaine and oxymetazoline was applied to the nose. The scope was passed through the nose. Examination was carried out of the nose, nasopharynx, oropharynx, hypopharynx, and larynx with findings as noted below. Scope was removed. The patient tolerated the procedure well.      Findings: No masses or lesions in the nose, nasopharynx, oropharynx, hypopharynx, or larynx. Vocal fold abduction and adduction is partially limited on the right, but paramedian; she does compensate well with no glottic gap. No pooling of secretions in the piriform sinuses, penetration, or aspiration.         Assessment/Plan:    1. Vocal cord paresis    2. Cerebrovascular accident (CVA), unspecified mechanism      VC paresis after stroke 2024, now with much improved voice and swallowing. Endoscopy shows continud paresis of the right cord,but she is compensating very well. Since her symptoms are not bothersome she elected for observation.  Clear for intubation with no additional risk beyond standard risks   F/u PRN          Guicho Godinez MD  [facility]   [facility] [address]  [place] [address]  P: [phone]  F: [phone]

## 2024-11-25 NOTE — TELEPHONE ENCOUNTER
Received call from pt re: surgery scheduled for 12/04/2024. Pt requesting 7 days of Home Health services via [employer]. Advised that surgeon may order Home Health at discharge and request will be routed to primary carem providers for review/response. Pt states surgeon plans to burn nerves and administer steroid injection(s).   Pt reports that she had another episode of angina, relieved by 2 doses of nitroglycerine. Pt had a visit on 11.22.2024 with cardiologist, Dr. Ike King, [facility] [place]. As per pt, EKG performed, cardiologist expressed concern approved 12.04.2024 and advised pt to avoid any additional future sx, excepting life preserving sx due to concern for cardiac hx/current state.

## 2024-11-26 ENCOUNTER — TELEPHONE (OUTPATIENT)
Dept: TRANSPLANT | Facility: CLINIC | Age: 69
End: 2024-11-26
Payer: MEDICARE

## 2024-11-27 NOTE — TELEPHONE ENCOUNTER
Spoke with pt regarding her discharge from [facility] and that the person who brought her down stairs   Left her to wait for her Uber ride and once the Uber  arrived, she stated that the Uber  was driving very fast and too close to the cars, I asked if she received a survey or phone number to report, she stated that she did and she was going to call.   She stated that she is very grateful and will be cooking for her neighbors w/o family For Thanksgiving   She also stated that she has not had any more episodes of chest pain and has the Nitroglycerin if she does.

## 2024-12-03 ENCOUNTER — TELEPHONE (OUTPATIENT)
Dept: TRANSPLANT | Facility: CLINIC | Age: 69
End: 2024-12-03
Payer: MEDICARE

## 2024-12-03 ENCOUNTER — TELEPHONE (OUTPATIENT)
Dept: PAIN MEDICINE | Facility: CLINIC | Age: 69
End: 2024-12-03
Payer: MEDICARE

## 2024-12-03 ENCOUNTER — TELEPHONE (OUTPATIENT)
Dept: PRIMARY CARE CLINIC | Facility: CLINIC | Age: 69
End: 2024-12-03
Payer: MEDICARE

## 2024-12-03 DIAGNOSIS — Z01.810 PREOP CARDIOVASCULAR EXAM: Primary | ICD-10-CM

## 2024-12-03 NOTE — TELEPHONE ENCOUNTER
----- Message from Beatriz sent at 12/3/2024  1:57 PM CST -----  Contact: [phone]  [email or web address]     Patient is calling for Medical Advice regarding:surgery time for tomorrow     How long has patient had these symptoms:    Pharmacy name and phone#:    Patient wants a call back or thru [facility]:call back     Comments:  She is asking for the nurse to please call her so she knows what time her surgery is for tomorrow   Please advise patient replies from provider may take up to 48 hours.

## 2024-12-03 NOTE — TELEPHONE ENCOUNTER
Phone number [phone] scheduling  [facility]     Called them regarding Clearance for procedure on 12/4

## 2024-12-03 NOTE — TELEPHONE ENCOUNTER
Received call from pt re: surgery update. [facility] surgeon's office is awaiting clearance document from Dr. Hunter Lopez  to move forward with sx tomorrow, 12.04.2024. If clearance document not received, will reschedule sx for 12.06.2024. Pt will send update tomorrow with plan.

## 2024-12-03 NOTE — TELEPHONE ENCOUNTER
Returned call to patient and she was given the number to call the surgery center for an arrival time.

## 2024-12-04 ENCOUNTER — E-CONSULT (OUTPATIENT)
Dept: TRANSPLANT | Facility: CLINIC | Age: 69
End: 2024-12-04
Payer: MEDICARE

## 2024-12-04 DIAGNOSIS — I25.811 CORONARY ARTERY DISEASE INVOLVING TRANSPLANTED HEART, UNSPECIFIED VESSEL OR LESION TYPE, UNSPECIFIED WHETHER ANGINA PRESENT: Primary | ICD-10-CM

## 2024-12-04 DIAGNOSIS — I50.32 CHRONIC DIASTOLIC (CONGESTIVE) HEART FAILURE: Chronic | ICD-10-CM

## 2024-12-04 DIAGNOSIS — T86.290 CARDIAC ALLOGRAFT VASCULOPATHY: ICD-10-CM

## 2024-12-04 DIAGNOSIS — N18.5 CKD (CHRONIC KIDNEY DISEASE) STAGE 5, GFR LESS THAN 15 ML/MIN: Chronic | ICD-10-CM

## 2024-12-04 NOTE — PROGRESS NOTES
The patient has had an E-Consult completed. Please refer to that note as needed. Please communicate the information below to the patient. Based on the recommendations of the specialist, I recommend that the patient do the following:

## 2024-12-04 NOTE — PROGRESS NOTES
Subjective:       Patient ID: Christina Leal is a 26 y.o. female.    Chief Complaint:  Follow-up      History of Present Illness  HPI  The patient location is: Home, [facility]  The chief complaint leading to consultation is: Follow up birth control initation 3 months    Visit type: audiovisual    Face to Face time with patient: 30 minutes of total time spent on the encounter, which includes face to face time and non-face to face time preparing to see the patient (eg, review of tests), Obtaining and/or reviewing separately obtained history, Documenting clinical information in the electronic or other health record, Independently interpreting results (not separately reported) and communicating results to the patient/family/caregiver, or Care coordination (not separately reported).         Each patient to whom he or she provides medical services by telemedicine is:  (1) informed of the relationship between the physician and patient and the respective role of any other health care provider with respect to management of the patient; and (2) notified that he or she may decline to receive medical services by telemedicine and may withdraw from such care at any time.    Notes:     Pt presents for 3 month follow up visit from initiation of OCP. Pt has been ill recently. Oct 2022, pt was hospitalized and treated for left pleural empyema. Is now back at home starting to feel better. States that she had been inconsistent with taking the pill while she was in and out of the hospital but did notice her cycles are more consistent on the pill. Pt would like to continue OCP as contraception and period regulation. No other concerns.        GYN & OB History  Patient's last menstrual period was 2022.   Date of Last Pap: 2022    OB History    Para Term  AB Living   0 0 0 0 0 0   SAB IAB Ectopic Multiple Live Births   0 0 0 0 0       Review of Systems  Review of Systems   Constitutional:  Negative for  The patient has had an E-Consult completed. Please refer to that note as needed. Please communicate the information below to the patient. Based on the recommendations of the specialist, I recommend that the patient do the following:    SHe is high risk for cardiac complication in setting of known CAV and RV dysfunction, diastolic dysfunction.    activity change, appetite change, chills, fatigue and fever.   HENT:  Negative for nasal congestion and mouth sores.    Respiratory:  Negative for cough, shortness of breath and wheezing.    Cardiovascular:  Negative for chest pain.   Gastrointestinal:  Negative for abdominal pain, constipation, diarrhea, nausea and vomiting.   Endocrine: Negative for hair loss and hot flashes.   Genitourinary:  Negative for bladder incontinence, decreased libido, dysmenorrhea, dyspareunia, dysuria, frequency, genital sores, hematuria, menorrhagia, menstrual problem, pelvic pain, urgency, vaginal discharge, vaginal pain, urinary incontinence, postcoital bleeding, vaginal dryness and vaginal odor.   Musculoskeletal:  Negative for back pain.   Integumentary:  Negative for breast mass, nipple discharge, breast skin changes and breast tenderness.   Neurological:  Negative for headaches.   Hematological:  Negative for adenopathy.   Psychiatric/Behavioral:  Negative for depression and sleep disturbance. The patient is not nervous/anxious.    All other systems reviewed and are negative.  Breast: Negative for breast self exam, lump, mass, mastodynia, nipple discharge, skin changes and tenderness        Objective:      Physical Exam:   Constitutional: She is oriented to person, place, and time. She appears well-developed and well-nourished. No distress.    HENT:   Head: Normocephalic and atraumatic.    Eyes: EOM are normal.      Pulmonary/Chest: Effort normal.                  Musculoskeletal: Normal range of motion.       Neurological: She is alert and oriented to person, place, and time.    Skin: No rash noted. She is not diaphoretic. No erythema. No pallor.    Psychiatric: She has a normal mood and affect. Her behavior is normal. Judgment and thought content normal.         Assessment:        1. Surveillance for birth control, oral contraceptives               Plan:       Surveillance for birth control, oral contraceptives    Rx for  tri-sprintec good for 1 year. Follow up as needed.

## 2024-12-04 NOTE — CONSULTS
[id number]  Response for E-Consult     Patient Name: Nadia Damon  MRN: [id number]  Primary Care Provider: Elis Wick MD   Requesting Provider: Jassi Pierre MD  Consults    Recommendation: Pt is high risk for cardiac complication from a cardiac standpoint, given multiple cardiac issues. That being said if it is urgent, recommend as indicated by requesting service. Adequate blood pressure and volume management as needed periop.     Contingency that warrants a repeat eConsult or referral:     Total time of Consultation: 10 minute    I did not speak to the requesting provider verbally about this.     *This eConsult is based on the clinical data available to me and is furnished without benefit of a physical examination. The eConsult will need to be interpreted in light of any clinical issues or changes in patient status not available to me at the time of filing this eConsults. Significant changes in patient condition or level of acuity should result in immediate formal consultation and reevaluation. Please alert me if you have further questions.    Thank you for this eConsult referral.     MD [id number]

## 2024-12-04 NOTE — PRE-PROCEDURE INSTRUCTIONS
Spoke with patient regarding procedure scheduled on 12.5     Arrival time 1330     Has patient been sick with fever or on antibiotics within the last 7 days? No     Does the patient have any open wounds, sores or rashes? No     Does the patient have any recent fractures? no     Has patient received a vaccination within the last 7 days? No      Has the patient stopped all medications as directed? na-continue per MD. Patient reports taking Nitro at least once per day for chest pain. MD aware.     Does patient have a pacemaker, defibrillator, or implantable stimulator? No     Does the patient have a ride to and from procedure and someone reliable to remain with patient? unsure, educated     Is the patient diabetic? no     Does the patient have sleep apnea? Or use O2 at home? no     Is the patient receiving sedation?      Is the patient instructed to remain NPO beginning at midnight the night before their procedure? yes     Procedure location confirmed with patient? Yes     Covid- Denies signs/symptoms. Instructed to notify PAT/MD if any changes.

## 2024-12-05 ENCOUNTER — HOSPITAL ENCOUNTER (OUTPATIENT)
Facility: HOSPITAL | Age: 69
Discharge: HOME | End: 2024-12-05
Attending: ANESTHESIOLOGY | Admitting: ANESTHESIOLOGY
Payer: MEDICARE

## 2024-12-05 VITALS
DIASTOLIC BLOOD PRESSURE: 79 MMHG | OXYGEN SATURATION: 97 % | TEMPERATURE: 98 F | BODY MASS INDEX: 27.36 KG/M2 | HEART RATE: 96 BPM | WEIGHT: 144.94 LBS | RESPIRATION RATE: 16 BRPM | SYSTOLIC BLOOD PRESSURE: 136 MMHG | HEIGHT: 61 IN

## 2024-12-05 DIAGNOSIS — M47.816 LUMBAR SPONDYLOSIS: Primary | ICD-10-CM

## 2024-12-05 PROCEDURE — 64636 DESTROY L/S FACET JNT ADDL: CPT | Mod: HCNC,RT,, | Performed by: ANESTHESIOLOGY

## 2024-12-05 PROCEDURE — 63600175 PHARM REV CODE 636 W HCPCS: Mod: HCNC | Performed by: ANESTHESIOLOGY

## 2024-12-05 PROCEDURE — 64635 DESTROY LUMB/SAC FACET JNT: CPT | Mod: HCNC,RT | Performed by: ANESTHESIOLOGY

## 2024-12-05 PROCEDURE — 64636 DESTROY L/S FACET JNT ADDL: CPT | Mod: HCNC,RT | Performed by: ANESTHESIOLOGY

## 2024-12-05 PROCEDURE — 64635 DESTROY LUMB/SAC FACET JNT: CPT | Mod: HCNC,RT,, | Performed by: ANESTHESIOLOGY

## 2024-12-05 RX ORDER — BUPIVACAINE HYDROCHLORIDE 2.5 MG/ML
INJECTION, SOLUTION EPIDURAL; INFILTRATION; INTRACAUDAL
Status: DISCONTINUED | OUTPATIENT
Start: 2024-12-05 | End: 2024-12-05 | Stop reason: HOSPADM

## 2024-12-05 RX ORDER — LIDOCAINE HYDROCHLORIDE 10 MG/ML
INJECTION, SOLUTION EPIDURAL; INFILTRATION; INTRACAUDAL; PERINEURAL
Status: DISCONTINUED | OUTPATIENT
Start: 2024-12-05 | End: 2024-12-05 | Stop reason: HOSPADM

## 2024-12-05 RX ORDER — FENTANYL CITRATE 50 UG/ML
INJECTION, SOLUTION INTRAMUSCULAR; INTRAVENOUS
Status: DISCONTINUED | OUTPATIENT
Start: 2024-12-05 | End: 2024-12-05 | Stop reason: HOSPADM

## 2024-12-05 RX ORDER — MIDAZOLAM HYDROCHLORIDE 1 MG/ML
INJECTION, SOLUTION INTRAMUSCULAR; INTRAVENOUS
Status: DISCONTINUED | OUTPATIENT
Start: 2024-12-05 | End: 2024-12-05 | Stop reason: HOSPADM

## 2024-12-05 RX ORDER — ONDANSETRON HYDROCHLORIDE 2 MG/ML
4 INJECTION, SOLUTION INTRAVENOUS ONCE AS NEEDED
Status: DISCONTINUED | OUTPATIENT
Start: 2024-12-05 | End: 2024-12-05 | Stop reason: HOSPADM

## 2024-12-05 RX ORDER — METHYLPREDNISOLONE ACETATE 40 MG/ML
INJECTION, SUSPENSION INTRA-ARTICULAR; INTRALESIONAL; INTRAMUSCULAR; SOFT TISSUE
Status: DISCONTINUED | OUTPATIENT
Start: 2024-12-05 | End: 2024-12-05 | Stop reason: HOSPADM

## 2024-12-05 NOTE — H&P
HPI  Patient presenting for Procedure(s) (LRB):  Right L4-5 and L5-S1 RFA (Right)     Patient on Anti-coagulation Yes, ASA, may continue    No health changes since previous encounter    Past Medical History:   Diagnosis Date    Abdominal wall hernia     CT Renal 6/11/2018---Small fat containing superior ventral abdominal wall hernia at the epicardial pacing lead site.    Abnormal mammogram 10/12/2021    Acute cystitis without hematuria 05/10/2022    Acute ischemic right middle cerebral artery (MCA) stroke 07/20/2024    GRACE (acute kidney injury) 11/22/2021    Anxiety     Aphasia 07/19/2024    Arthritis     ZEN HIPS    Breast cancer in female 08/2021    LEFT BREAST    Breast mass, right 11/13/2024    RIGHT BREAST MASS (Problem)      Bronchitis 08/18/2016    Never smoked      C. difficile colitis 11/29/2021    Cellulitis of axilla, left 12/23/2021    Chronic diastolic heart failure 12/16/2021    Chronic kidney disease     stage 4, GFR 15-29 ml/min    Chronic midline low back pain without sciatica 06/18/2018    CKD (chronic kidney disease) stage 4, GFR 15-29 ml/min 04/20/2016    US Retro   5/16/2018---Mild cortical thinning and increased cortical echogenicity.  Findings can be seen with medical renal disease.  8/31/216--- Echogenic kidneys with diffuse cortical thinning suggesting  medical renal disease. 2 complex right renal cortical cysts.      Closed nondisplaced fracture of distal phalanx of left great toe with routine healing 10/22/2018    Coronary artery disease 1993    heart transplant    COVID-19 in immunocompromised patient 02/26/2024    Cystitis 05/10/2022    Depression     Encounter for blood transfusion     Encounter for palliative care 10/14/2024    Fibromyalgia     on Lyrica    Heart failure     native heart cardiomyopathy    Heart transplanted 1993    due to cardiomyopathy    History of hyperparathyroidism; Hyperparathyroidism, secondary renal     PT DENIES    Hypertension     Immune disorder     anti  rejection meds    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    Impaired mobility 07/28/2022    Iron deficiency anemia 08/15/2017    Kidney stones     passed per pt    Obesity     Obesity (BMI 30.0-34.9) 07/22/2019    Other osteoporosis without current pathological fracture 08/30/2019    Other pancytopenia 05/06/2024    Parotitis, acute 09/19/2023    Pharyngitis 01/09/2019    Pneumonia due to infectious organism 03/14/2024    PONV (postoperative nausea and vomiting)     Severe sepsis 11/22/2021    Shingles 2003 approx    left leg    Stage 4 chronic kidney disease 11/22/2021    Subclinical hypothyroidism 06/16/2023    Thrombocytopenia, unspecified 11/29/2021    Trouble in sleeping     Unresponsiveness 11/13/2024    Urinary incontinence      Past Surgical History:   Procedure Laterality Date    bladder implant Right 06/11/2024    BLADDER SURGERY  2015 approx    mesh - Dr Everett then 2nd reconstructive sx Dr Onofre    BREAST BIOPSY Bilateral     NEGATIVE    BREAST BIOPSY Right 10/31/2022    benign    BREAST LUMPECTOMY Left 2021    BREAST SURGERY Left 09/28/2015    Bx - benign    BREAST SURGERY Right 12/2015    Bx benign    CARDIAC PACEMAKER REMOVAL Left 06/26/2014    Pacer defirillator removed. Put in 1993 aat time of heart transplant    CARPAL TUNNEL RELEASE Left 03/03/2015    Dr. Hall    COLONOSCOPY N/A 02/25/2021    Procedure: COLONOSCOPY;  Surgeon: Freida Ramirez MD;  Location: [facility];  Service: Endoscopy;  Laterality: N/A;    CYSTOCELE REPAIR      Twice with mesh removal    EPIDURAL STEROID INJECTION INTO CERVICAL SPINE N/A 02/02/2023    Procedure: T11/T12 IL HELLEN;  Surgeon: Jassi Pierre MD;  Location: [facility];  Service: Pain Management;  Laterality: N/A;    EPIDURAL STEROID INJECTION INTO CERVICAL SPINE N/A 9/16/2024    Procedure: T11/T12 IL HELLEN;  Surgeon: Jassi Pierre MD;  Location: [facility];  Service: Pain Management;  Laterality: N/A;    HEART TRANSPLANT  1993     HERNIA REPAIR Right 1971 approx    Inguinal    HYSTERECTOMY  1983    vag hyst /LSO     IMPLANTATION OF PERMANENT SACRAL NERVE STIMULATOR N/A 06/11/2024    Procedure: INSERTION, NEUROSTIMULATOR, PERMANENT, SACRAL;  Surgeon: Sami Cochran MD;  Location: [facility] [place] OR;  Service: Urology;  Laterality: N/A;    INCISION AND DRAINAGE OF ABSCESS Left 12/24/2021    Procedure: INCISION AND DRAINAGE, ABSCESS;  Surgeon: Joseph Longo MD;  Location: [facility] [place] OR;  Service: General;  Laterality: Left;    INJECTION OF ANESTHETIC AGENT AROUND MEDIAL BRANCH NERVES INNERVATING LUMBAR FACET JOINT Right 10/19/2022    Procedure: Right L4/L5 and L5/S1 MBB;  Surgeon: Jassi Pierre MD;  Location: [facility];  Service: Pain Management;  Laterality: Right;    INJECTION OF ANESTHETIC AGENT AROUND MEDIAL BRANCH NERVES INNERVATING LUMBAR FACET JOINT Right 11/09/2022    Procedure: Right L4/L5 and L5/S1 MBB;  Surgeon: Jassi Pierre MD;  Location: [facility];  Service: Pain Management;  Laterality: Right;    INJECTION OF ANESTHETIC AGENT INTO SACROILIAC JOINT Right 08/22/2022    Procedure: Right SIJ Injection Right L5/S1 Facte Injection;  Surgeon: Jassi Pierre MD;  Location: [facility];  Service: Pain Management;  Laterality: Right;    INSERTION OF TUNNELED CENTRAL VENOUS CATHETER (CVC) WITH SUBCUTANEOUS PORT N/A 11/09/2021    Procedure: [id number];  Surgeon: Christoph Douglas MD;  Location: [facility] OR;  Service: General;  Laterality: N/A;    RADIOFREQUENCY THERMOCOAGULATION Right 12/07/2022    Procedure: Right L4/L5 and L5/S1 Lumbar RFA;  Surgeon: Jassi Pierre MD;  Location: [facility];  Service: Pain Management;  Laterality: Right;    REMOVAL OF VASCULAR ACCESS PORT      ROBOT-ASSISTED LAPAROSCOPIC ABDOMINAL SACROCOLPOPEXY N/A 08/10/2023    Procedure: ROBOTIC SACROCOLPOPEXY, ABDOMEN;  Surgeon: PRANAY Villalobos MD;  Location: [facility] [place] OR;  Service: OB/GYN;  Laterality: N/A;    ROBOT-ASSISTED LAPAROSCOPIC  OOPHORECTOMY Right 08/10/2023    Procedure: ROBOTIC OOPHORECTOMY;  Surgeon: PRANAY Villalobos MD;  Location: [facility] OR;  Service: OB/GYN;  Laterality: Right;    SENTINEL LYMPH NODE BIOPSY Left 10/12/2021    Procedure: BIOPSY, LYMPH NODE, SENTINEL;  Surgeon: Christoph Douglas MD;  Location: [facility] OR;  Service: General;  Laterality: Left;    TOE SURGERY      XI ROBOTIC URETHROPEXY N/A 08/10/2023    Procedure: XI ROBOTIC URETHROPEXY;  Surgeon: PRANAY Villalobos MD;  Location: [facility] OR;  Service: OB/GYN;  Laterality: N/A;     Review of patient's allergies indicates:   Allergen Reactions    Dobutamine Other (See Comments)     Severe Left sided chest pain after dosage administered for Dobutamine Stress Test; itching    Lisinopril Swelling and Rash    Nitro Shortness Of Breath     Audible wheezing - after Nitrolgycerin Spray administered x 2; itching    Hydrocodone-acetaminophen Nausea Only    Augmentin [amoxicillin-pot clavulanate] Diarrhea    Zyvox [linezolid] Nausea And Vomiting        No current facility-administered medications on file prior to encounter.     Current Outpatient Medications on File Prior to Encounter   Medication Sig Dispense Refill    calcitRIOL (ROCALTROL) 0.25 MCG Cap Take 1 capsule (0.25 mcg total) by mouth once daily. 30 capsule 11    carvediloL (COREG) 3.125 MG tablet Take 1 tablet (3.125 mg total) by mouth 2 (two) times daily with meals. Hold parameters: SBP less than 110 and/or DBP less than 75 180 tablet 3    cycloSPORINE modified, NEORAL, (NEORAL) 25 MG capsule Take 3 capsules (75 mg total) by mouth 2 (two) times daily. 540 capsule 1    furosemide (LASIX) 20 MG tablet Take 2 tablets (40 mg total) by mouth once daily. HOLD UNTIL FOLLOW UP WITH PCP 60 tablet 11    nitroGLYCERIN (NITROSTAT) 0.4 MG SL tablet Place 1 tablet (0.4 mg total) under the tongue every 5 (five) minutes as needed for Chest pain. For up to 3 doses. If continued heart pain/pressure after 2nd dose call 911. 100 tablet 0     "polyethylene glycol (GLYCOLAX) 17 gram PwPk Take 17 g by mouth once daily.      pregabalin (LYRICA) 25 MG capsule Take 1 capsule (25 mg total) by mouth every evening. 30 capsule 2    sodium bicarbonate 650 MG tablet Take 1 tablet (650 mg total) by mouth 2 (two) times daily. 60 tablet 1    temazepam (RESTORIL) 30 mg capsule Take 1 capsule (30 mg total) by mouth nightly as needed for Insomnia. FOR INSOMNIA 90 capsule 1    tiZANidine 4 mg Cap Take 2 mg by mouth nightly as needed (muscle spasm).      vitamin E 400 UNIT capsule Take 400 Units by mouth once daily.      zolpidem (AMBIEN) 5 MG Tab Take 1 tablet (5 mg total) by mouth every evening. 90 tablet 1    aspirin (ECOTRIN) 81 MG EC tablet Take 1 tablet (81 mg total) by mouth once daily.      ondansetron (ZOFRAN) 4 MG tablet Take 4 mg by mouth as needed for Nausea.      pantoprazole (PROTONIX) 20 MG tablet TAKE 1 TABLET ONE TIME DAILY 90 tablet 3        PMHx, PSHx, Allergies, Medications reviewed in epic    ROS negative except pain complaints in HPI    OBJECTIVE:    /60 (BP Location: Right arm, Patient Position: Sitting)   Pulse 95   Temp 97.6 °F (36.4 °C) (Temporal)   Resp 16   Ht 5' 1" (1.549 m)   Wt 65.8 kg (144 lb 15.2 oz)   LMP 06/20/1983 (Within Years)   SpO2 98%   Breastfeeding No   BMI 27.39 kg/m²     PHYSICAL EXAMINATION:    GENERAL: Well appearing, in no acute distress, alert and oriented x3.  PSYCH:  Mood and affect appropriate.  SKIN: Skin color, texture, turgor normal, no rashes or lesions which will impact the procedure.  CV: RRR with palpation of the radial artery.  PULM: No evidence of respiratory difficulty, symmetric chest rise. Clear to auscultation.  NEURO: Cranial nerves grossly intact.    Plan:    Proceed with procedure as planned Procedure(s) (LRB):  Right L4-5 and L5-S1 RFA (Right)    Jassi Pierre MD  12/05/2024            "

## 2024-12-05 NOTE — OP NOTE
Lumbar Medial nerve branch block radiofrequency ablation Under Fluoroscopy  Right L4/5 and L5/S1    INFORMED CONSENT: The procedure, risks, benefits and options were discussed with patient. There are no contraindications to the procedure. The patient expressed understanding and agreed to proceed. The personnel performing the procedure was discussed.    Date of procedure 12/05/2024    Time-out taken to identify patient and procedure side prior to starting the procedure.                     PROCEDURE: Right radiofrequency ablation of the the medial branch nerves at the   transverse process of  L4, L5, and the sacral ala    Pre Procedure diagnosis:    Lumbar spondylosis [M47.816]  1. Lumbar spondylosis        Post-Procedure diagnosis:   same      PHYSICIAN: Jassi Pierre MD    ASSISTANTS: None     LOCAL ANESTHETIC USED: 1ml of Lidocaine 1%, at each level    Sedation: Conscious sedation provided by M.D    SEDATION MEDICATIONS: local/IV sedation: Versed 2 mg and fentanyl 50 mcg IV.  Conscious sedation ordered by MD.  Patient reevaluated and sedation administered by MD and monitored by RN.  Total sedation time was less than 20 min.    Total sedation time was >10 but <20 min    ESTIMATED BLOOD LOSS: None.     COMPLICATIONS: None.       TECHNIQUE: Laying in a prone position, the patient was prepped and draped in the usual sterile fashion using ChloraPrep and fenestrated drape. The level was determined under fluoroscopic guidance. Local anesthetic was given by going down to the hub of the 27-gauge 1.25in needle and raising a wheel. A 18-gauge 10mm curved active tip needle was introduced to the anatomic local of the medial branch at each of the above levels using fluoroscopy. Then sensory and motor testing was performed to confirm that the needle tips were in the correct location. Then after negative aspiration, 1ml of lidocaine PF 1% was injected at each level. This was followed by thermal lesioning at 80 degrees celsius  for 90 seconds. After lesioning, 0.5ml of bupivacaine PF 0.25% and 5mg of DepoMedrol was injected at each level.    The patient tolerated the procedure well. Did not have any procedure related motor deficit at the conclusion of the procedure      The patient was monitored for approximately 30 minutes after the procedure.  Patient was given post procedure and discharge instructions to follow at home.  The patient was discharged in a stable condition

## 2024-12-05 NOTE — DISCHARGE INSTRUCTIONS
Pain Post Injection     Side effects may include: headache, restlessness at night, weakness to upper or lower extremities (arms or legs), flushing of the face and/ or neck. None are life threatening and will subside within 2-3 days.   If you have SEVERE headache with nausea and extreme pain, please call office ([phone]). Unless you usually have this type of migraine headache.   If you develop fever (greater than 101 F) or have any redness, swelling, or drainage at the injection site(s), please call off ([phone]). This may be a sign of infection.   If a rash or whelps (like hives) occur, please call the office ([phone]).  If you are a heart patient, please watch for symptoms such as swelling in your hands and feet and shortness of breath. If any of these symptoms occur, please notify your primary care/ heart doctor and go to the nearest emergency room.  If you have high blood pressure, you may experience an increase in your blood pressure over the next two weeks. Please continue to monitor your blood pressure and contact your primary care provider if your blood pressure does not return to baseline.    If you are a diabetic or you monitor your blood sugar, you may have an increase in your blood sugar over the next two weeks. If your blood sugar does not return to your baseline, please contact your primary care provider.  NO HEAT TO THE INJECTION SITE for the next 24 hours including: bath or shower, heating pad, moist heat, and / or hot tubs.   May use ice pack to injection site for any pain or discomfort. Apply ice pack for 20 minutes then remove for 20 minutes before re- applying to site. (recipe for homemade frozen gel pack below)  DO NOT DRIVE OR OPERATE HEAVY MACHINERY UNTIL TOMORROW MORNING.   OK to resume all medications unless otherwise directed by your doctor.  Injection(s) may take up to  two weeks until full effects are noted.  You may return to normal activity/ work the following day.  Please do not receive a flu or pneumonia vaccine until one week after your procedure.  Band aids/ Dressings can come off at ____________24 hrs later on 12/6/2024____________________.  .  Homemade Frozen Gel Ice Pack:  1 bottle of rubbing alcohol  3 bottles of water (use rubbing alcohol bottle to measure)  2 large zip lock bags    Instructions:  Pour alcohol and water into zip lock bag. Make sure bag is large enough to allow for expansion.  Try to get as much air out of the freezer bag before sealing it shut.   Place the bag and its contents inside a second freezer bag to contain any leakage.   Leave the bag in the freezer for at least one hour.  When it's ready, place a towel between the gel pack and bare skin to avoid burning the skin.

## 2024-12-05 NOTE — DISCHARGE SUMMARY
Discharge Note  Short Stay      SUMMARY     Admit Date: 12/5/2024    Attending Physician: Jassi Pierre MD        Discharge Physician: Jassi Pierre MD        Discharge Date: 12/5/2024 3:07 PM    Procedure(s) (LRB):  Right L4-5 and L5-S1 RFA (Right)    Final Diagnosis: Lumbar spondylosis [M47.816]    Disposition: Home or self care    Patient Instructions:   Current Discharge Medication List        CONTINUE these medications which have NOT CHANGED    Details   calcitRIOL (ROCALTROL) 0.25 MCG Cap Take 1 capsule (0.25 mcg total) by mouth once daily.  Qty: 30 capsule, Refills: 11      carvediloL (COREG) 3.125 MG tablet Take 1 tablet (3.125 mg total) by mouth 2 (two) times daily with meals. Hold parameters: SBP less than 110 and/or DBP less than 75  Qty: 180 tablet, Refills: 3    Comments: .      cycloSPORINE modified, NEORAL, (NEORAL) 25 MG capsule Take 3 capsules (75 mg total) by mouth 2 (two) times daily.  Qty: 540 capsule, Refills: 1    Associated Diagnoses: Heart transplanted      furosemide (LASIX) 20 MG tablet Take 2 tablets (40 mg total) by mouth once daily. HOLD UNTIL FOLLOW UP WITH PCP  Qty: 60 tablet, Refills: 11    Comments: Updating dose - Pt does not need refill at this time - please refill when requested      nitroGLYCERIN (NITROSTAT) 0.4 MG SL tablet Place 1 tablet (0.4 mg total) under the tongue every 5 (five) minutes as needed for Chest pain. For up to 3 doses. If continued heart pain/pressure after 2nd dose call 911.  Qty: 100 tablet, Refills: 0      polyethylene glycol (GLYCOLAX) 17 gram PwPk Take 17 g by mouth once daily.      pregabalin (LYRICA) 25 MG capsule Take 1 capsule (25 mg total) by mouth every evening.  Qty: 30 capsule, Refills: 2    Comments: Does not need refill at this time - adjusting sig - refill when requested  Associated Diagnoses: Fibromyalgia      sodium bicarbonate 650 MG tablet Take 1 tablet (650 mg total) by mouth 2 (two) times daily.  Qty: 60 tablet, Refills: 1       temazepam (RESTORIL) 30 mg capsule Take 1 capsule (30 mg total) by mouth nightly as needed for Insomnia. FOR INSOMNIA  Qty: 90 capsule, Refills: 1      tiZANidine 4 mg Cap Take 2 mg by mouth nightly as needed (muscle spasm).      vitamin E 400 UNIT capsule Take 400 Units by mouth once daily.      zolpidem (AMBIEN) 5 MG Tab Take 1 tablet (5 mg total) by mouth every evening.  Qty: 90 tablet, Refills: 1      aspirin (ECOTRIN) 81 MG EC tablet Take 1 tablet (81 mg total) by mouth once daily.      ondansetron (ZOFRAN) 4 MG tablet Take 4 mg by mouth as needed for Nausea.      pantoprazole (PROTONIX) 20 MG tablet TAKE 1 TABLET ONE TIME DAILY  Qty: 90 tablet, Refills: 3    Associated Diagnoses: Gastroesophageal reflux disease, unspecified whether esophagitis present                 Discharge Diagnosis: Lumbar spondylosis [M47.816]  Condition on Discharge: Stable with no complications to procedure   Diet on Discharge: Same as before.  Activity: as per instruction sheet.  Discharge to: Home with a responsible adult.  Follow up: 2-4 weeks       Please call the office at [phone] if you experience any weakness or loss of sensation, fever > 101.5, pain uncontrolled with oral medications, persistent nausea/vomiting/or diarrhea, redness or drainage from the incisions, or any other worrisome concerns. If physician on call was not reached or could not communicate with our office for any reason please go to the nearest emergency department

## 2024-12-06 RX ORDER — NITROGLYCERIN 0.4 MG/1
TABLET SUBLINGUAL
Qty: 100 TABLET | Refills: 0 | Status: SHIPPED | OUTPATIENT
Start: 2024-12-06

## 2024-12-11 ENCOUNTER — TELEPHONE (OUTPATIENT)
Dept: PRIMARY CARE CLINIC | Facility: CLINIC | Age: 69
End: 2024-12-11
Payer: MEDICARE

## 2024-12-11 ENCOUNTER — OFFICE VISIT (OUTPATIENT)
Dept: PRIMARY CARE CLINIC | Facility: CLINIC | Age: 69
End: 2024-12-11
Payer: MEDICARE

## 2024-12-11 VITALS
OXYGEN SATURATION: 97 % | HEIGHT: 61 IN | WEIGHT: 147.5 LBS | HEART RATE: 88 BPM | SYSTOLIC BLOOD PRESSURE: 154 MMHG | DIASTOLIC BLOOD PRESSURE: 82 MMHG | TEMPERATURE: 98 F | BODY MASS INDEX: 27.85 KG/M2

## 2024-12-11 DIAGNOSIS — R94.6 BORDERLINE ABNORMAL TFTS: Primary | ICD-10-CM

## 2024-12-11 DIAGNOSIS — I10 ESSENTIAL HYPERTENSION: Chronic | ICD-10-CM

## 2024-12-11 DIAGNOSIS — R60.0 LOWER EXTREMITY EDEMA: Chronic | ICD-10-CM

## 2024-12-11 DIAGNOSIS — N18.5 CKD (CHRONIC KIDNEY DISEASE) STAGE 5, GFR LESS THAN 15 ML/MIN: Chronic | ICD-10-CM

## 2024-12-11 DIAGNOSIS — M48.07 SPINAL STENOSIS OF LUMBOSACRAL REGION: Chronic | ICD-10-CM

## 2024-12-11 DIAGNOSIS — I87.2 CHRONIC VENOUS INSUFFICIENCY: Chronic | ICD-10-CM

## 2024-12-11 DIAGNOSIS — I89.0 LYMPHEDEMA OF LEFT ARM: Chronic | ICD-10-CM

## 2024-12-11 DIAGNOSIS — M79.7 FIBROMYALGIA: Chronic | ICD-10-CM

## 2024-12-11 DIAGNOSIS — Z94.1 HEART TRANSPLANTED: Chronic | ICD-10-CM

## 2024-12-11 PROBLEM — T50.905A ADVERSE DRUG REACTION: Status: RESOLVED | Noted: 2024-11-12 | Resolved: 2024-12-11

## 2024-12-11 PROCEDURE — 99999 PR PBB SHADOW E&M-EST. PATIENT-LVL V: CPT | Mod: PBBFAC,HCNC,, | Performed by: INTERNAL MEDICINE

## 2024-12-11 RX ORDER — PREGABALIN 25 MG/1
25 CAPSULE ORAL NIGHTLY
Qty: 90 CAPSULE | Refills: 0 | Status: SHIPPED | OUTPATIENT
Start: 2024-12-11 | End: 2025-03-12

## 2024-12-11 RX ORDER — TIZANIDINE 2 MG/1
2 TABLET ORAL NIGHTLY
Qty: 90 TABLET | Refills: 1 | Status: SHIPPED | OUTPATIENT
Start: 2024-12-11 | End: 2025-06-09

## 2024-12-11 NOTE — ASSESSMENT & PLAN NOTE
Recommend elevate legs when sitting and wear compression socks/stockings, furosemide, limit sodium intake

## 2024-12-11 NOTE — PROGRESS NOTES
Nadia Damon  12/11/2024  [id number]    Elis Wick MD  Patient Care Team:  Elis Wick MD as PCP - General (Internal Medicine)  Miguel Soni Jr., MD as Consulting Physician (Vascular Surgery)  Ike King MD as Consulting Physician (Cardiology)  Courtney Tubbs MD as Consulting Physician (Cardiology)  Carter Crawford MD as Consulting Physician (Nephrology)  Paxton Vasques OD as Consulting Physician (Optometry)  PRANAY Villalobos MD as Obstetrician (Obstetrics)  Parker Mccarthy IV, MD (Urology)  Ike King MD as Consulting Physician (Cardiology)  Jose Roland MD as Consulting Physician (Dermatology)  Prasanth Johnson MD as Consulting Physician (Rheumatology)  Elis Wick MD as Physician (Internal Medicine)  Lexi Bianchi LCSW as  (Hematology and Oncology)  Candace Saleh LMSW as  (Hematology and Oncology)  Kelsie Burk PA-C as Physician Assistant (Hematology and Oncology)  Chelsea Monte as ED Navigator    Visit Type: Follow-up    Ms. Damon is a 69 year old female here for scheduled f/u.     Ms. Damon w h/o heart transplant 1993, on anti-rejection medication. 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 She was treated with 1 cycle of systemic chemotherapy cytoxan and taxotere and udenyca that was discontinued due to toxicity. She has been followed by radiation oncology and completed last radiation treatment 5/14/22. 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       Ms. Damon suffered CVA 7/19/24 with subsequent hospitalization then transfer to [facility] to address on-going dysarthria and R-sided weakness. She also has severe spinal stenosis. Most recently s/p L 4-5 and L5-S1 RFA 12/05/24.      Prior to CVA: Interstim placed R hip 6/11/24 w Urology   [facility]      Other medical issues include depresssion/anxiety/insomnia, hypertension, chronic diastolic CHF, and osteoporois for which she is receiving Prolia.     History of Present Illness    CHIEF COMPLAINT:  Ms. Damon presents today for follow-up after undergoing RFA for left lumbar L4-5, L5-S1.    PAIN MANAGEMENT:  She reports starting to feel relief following the RFA procedure performed six days ago. Pain level has reduced significantly, now reported as 2-3 out of 10. Her pain medicine doctor informed her that full effect should be expected by two weeks post-procedure.    CARDIOVASCULAR:  She has a history of heart failure and heart transplant. Her cardiologist, Dr. Lopez, advised against any further surgeries or procedures that would require sedation due to her cardiac condition. She is a long-term heart transplant survivor for 32 years! Home blood pressure readings ranging from 145/88 to 160/80 recently. These measurements are usually taken in the morning before taking anti-hypertensive medications. She takes her blood pressure medication at 8:00 AM, after taking other kidney-related medication prescribed by Dr. Crawford. She uses nitroglycerin tablets as needed, typically taking up to two tablets when experiencing chest discomfort during activities like walking or cleaning. She sits down, takes deep breaths, and uses the nitro when symptoms onset. She has noted headaches with nitroglycerin use.    URINARY ISSUES:  She follows a scheduled urination routine every two hours as recommended, adhering to this schedule even when not feeling the urge to urinate. She has adjusted the stimulator settings for her urinary symptoms. She expresses uncertainty about the stimulator's effectiveness in managing symptoms due to concurrent pain issues, but mentions experiencing some relief after recent adjustments.    MEDICATIONS:  She reports issues with Pregabalin dosage, stating the pharmacy still has her on the 50 mg  "dose instead of the prescribed 25 mg dose. She has been taking the 50 mg dose only at night to limit daytime sedation . She prefers medications be sent to [facility] now and requests a 90-day supply. She is still taking Xanax nightly for sleep.    ENDOCRINE:  Advised to HOLD biotin starting January 1st in preparation for thyroid function testing scheduled for January 16th.     LYMPHEDEMA:  She has alerted us of left arm precautions due to cancer treatment and lymph node removal on that side. She has been advised to avoid blood pressure measurements and IV placements on the left arm. I advised we will be sure these precautions are added as a flag in her chart.     VENOUS STASIS:  She experiences lower extremity edema with prolonged standing or walking. The swelling can become severe enough to cause pain but improves with leg elevation. She denies relation to salt intake or diet. Advised again to wear compression socks or stockings to help manage the condition.       PHQ-4 Score: 0     From LOV w me 11/19/24  Since [facility] Ms. Damon was hospitalized 11/13 following adverse reaction during dobutamine stress test w transfer to [facility] 11/14, discharged 11/16.  Sent home from  to  via [employer] which was a traumatic experience.  At present c/o HA, nausea, heart pain with "heavy pounding". No radiation of pain or diaphoresis or SOB.  Reports last night chest pain was severe 10/10 with pressure and pounding and radiating to her back for over an hour 1-2am.  Last night also w nausea but no SOB or diaphoresis.    Hosp/ED/Urgent Care:  12/05/24 [facility] L 4-5 and L5-S1 RFA     Recent appointments:   11/25/24 [facility] vocal cord paresis  11/21/24 [facility]  11/20/24  [facility]    Upcoming appointments:   Next 10 Appointments       Date Provider Specialty Appt Notes    12/12/2024  Lab     12/19/2024 Carter Crawford MD Nephrology EP/RTC/2M/LABS/IDS    12/20/2024  Lab STAT    " 12/20/2024 Yudith Mendoza NP Hematology and Oncology 2mo/labs/poss retacrit  if hg <10// hm    12/20/2024  Chemotherapy poss retacrit after np//hm    1/7/2025 Mere Pineda, FNP-C Primary Care EAWV    1/8/2025 Corin Lincoln NP Pain Medicine 5 wk f/u    1/16/2025  Lab Dr. Johnson    1/17/2025 Sami Cochran MD Urology 6 month f/u    1/23/2025 Prasanth Johnson MD Rheumatology rtc 6 months           The following were reviewed: Active problem list, medication list, allergies, family history, social history, and Health Maintenance.     Medications have been reviewed and reconciled with patient at visit today.    Exam: sat 97%  Vitals:    12/11/24 0937   BP: (!) 154/82   Pulse: 88   Temp: 97.6 °F (36.4 °C)     Weight: 66.9 kg (147 lb 7.8 oz)   Body mass index is 27.87 kg/m².     BP Readings from Last 3 Encounters:   12/11/24 (!) 154/82   12/05/24 136/79   11/21/24 (!) 142/70      Wt Readings from Last 3 Encounters:   12/11/24 0937 66.9 kg (147 lb 7.8 oz)   12/05/24 1311 65.8 kg (144 lb 15.2 oz)   11/25/24 1328 68.4 kg (150 lb 12.7 oz)       Physical Exam  Vitals reviewed.   Constitutional:       General: She is not in acute distress.     Appearance: Normal appearance.   HENT:      Head: Normocephalic and atraumatic.      Right Ear: External ear normal.      Left Ear: External ear normal.      Nose: Nose normal.      Mouth/Throat:      Mouth: Mucous membranes are moist.      Pharynx: Oropharynx is clear.   Eyes:      Extraocular Movements: Extraocular movements intact.      Conjunctiva/sclera: Conjunctivae normal.   Cardiovascular:      Rate and Rhythm: Normal rate.   Pulmonary:      Effort: Pulmonary effort is normal. No respiratory distress.   Musculoskeletal:      Right lower leg: Edema present.      Left lower leg: Edema present.   Skin:     General: Skin is warm and dry.   Neurological:      Mental Status: She is alert and oriented to person, place, and time. Mental status is at baseline.       Gait: Gait abnormal.   Psychiatric:         Mood and Affect: Mood normal.         Behavior: Behavior normal.         Thought Content: Thought content normal.        Laboratory Reviewed   Lab Results   Component Value Date    WBC 3.52 (L) 11/15/2024    HGB 9.3 (L) 11/15/2024    HCT 29.8 (L) 11/15/2024     11/15/2024    MCV 92 11/15/2024    CHOL 142 07/20/2024    TRIG 85 07/20/2024    HDL 44 07/20/2024    LDLCALC 81.0 07/20/2024    ALT 20 11/15/2024    AST 30 11/15/2024     11/15/2024    K 4.0 11/15/2024     11/15/2024    CREATININE 3.9 (H) 11/15/2024    BUN 58 (H) 11/15/2024    CO2 26 11/15/2024    MG 2.0 11/15/2024    TSH 10.030 (H) 11/12/2024    FREET4 1.02 11/12/2024    PSA <0.1 05/27/2008    INR 1.0 11/12/2024    HGBA1C 5.1 07/02/2024    CRP 14.4 (H) 08/24/2023     Lab Results   Component Value Date    .3 (H) 10/01/2024    CALCIUM 8.4 (L) 11/15/2024    CAION 1.13 09/05/2018    PHOS 3.7 11/15/2024      Lab Results   Component Value Date    ZSQMPNQK55 1373 (H) 06/20/2023     Lab Results   Component Value Date    FOLATE 20.0 06/20/2023      Lab Results   Component Value Date    IRON 80 10/22/2024    TRANSFERRIN 228 10/22/2024    TIBC 337 10/22/2024    FESATURATED 24 10/22/2024      Lab Results   Component Value Date    EGFRNORACEVR 11.8 (A) 11/15/2024    ALBUMIN 3.1 (L) 11/15/2024     (H) 11/12/2024     Lab Results   Component Value Date    XKHBGIDE70XK 39 07/02/2024          Assessment:   69 y.o. female with multiple co-morbid illnesses here for continued follow up of medical problems.      The primary encounter diagnosis was Borderline abnormal TFTs. Diagnoses of Fibromyalgia, Spinal stenosis of lumbosacral region, Lymphedema of left arm, Heart transplanted, Essential hypertension, CKD (chronic kidney disease) stage 5, GFR less than 15 ml/min, Chronic venous insufficiency, and Lower extremity edema were also pertinent to this visit.      Plan:   1. Borderline abnormal TFTs  -      TSH; Future; Expected date: 01/16/2025    2. Fibromyalgia  -     pregabalin (LYRICA) 25 MG capsule; Take 1 capsule (25 mg total) by mouth every evening.  Dispense: 90 capsule; Refill: 0    3. Spinal stenosis of lumbosacral region  Assessment & Plan:  Followed by Pain Med - Reports already w sig pain relief s/p L 4-5 and L5-S1 RFA 12/05/24      4. Lymphedema of left arm  Assessment & Plan:  Look into adding EPIC BANNER to advise no BP - no IV - no blood draw from left arm  (Lymphedema/h/o L breast cancer)      5. Heart transplanted  Overview:  5/93     Assessment & Plan:  Maintains close f/u w [facility] transplant team - also followed by external cardiologist locally, Dr. King      6. Essential hypertension  Assessment & Plan:  Has not taken am anti-HTN medication yet - avoid overly aggressive BP lowering      7. CKD (chronic kidney disease) stage 5, GFR less than 15 ml/min  Assessment & Plan:  Cont veltassa, bicarb, close f/u w nephrology - in discussion regarding whether or not would want dialysis      8. Chronic venous insufficiency  Assessment & Plan:  Recommend elevate legs when sitting and wear compression socks/stockings, furosemide, limit sodium intake      9. Lower extremity edema  Overview:  Multifactoral: CKD stage 5, CHF, venous insufficiency    Assessment & Plan:  Recommend elevate legs when sitting and wear compression socks/stockings, furosemide, limit sodium intake      Other orders  -     tiZANidine (ZANAFLEX) 2 MG tablet; Take 1 tablet (2 mg total) by mouth every evening.  Dispense: 90 tablet; Refill: 1         Health Maintenance         Date Due Completion Date    RSV Vaccine (Age 60+ and Pregnant patients) (1 - Risk 60-74 years 1-dose series) Never done ---    COVID-19 Vaccine (7 - 2024-25 season) 09/01/2024 4/27/2023    Mammogram 04/18/2025 4/18/2024    Lipid Panel 07/20/2025 7/20/2024    Colorectal Cancer Screening 02/25/2026 2/25/2021    Hemoglobin A1c (Diabetic Prevention Screening) 07/02/2027  7/2/2024    DEXA Scan 07/03/2027 7/3/2024    TETANUS VACCINE 09/09/2030 9/9/2020          -Patient's lab results were reviewed and discussed with patient  -Treatment options and alternatives were discussed with the patient. Patient expressed understanding. Patient was given the opportunity to ask questions and be an active participant in their medical care. Patient had no further questions or concerns at this time.     Follow up: Follow up in about 1 month (around 1/11/2025) for Follow Up for EAWV w ONELIA Pineda and 2-4 weeks after w me .    After visit summary printed and given to patient upon discharge.  Patient care plan included in After visit summary.    TOTAL TIME evaluating and managing this patient for this encounter was 52 minutes. This time was spent personally by me on some of the following activities: review of patient's past medical history, assessing age-appropriate health maintenance needs, review of any interval history, review and interpretation of lab results, review and interpretation of imaging test results, review and interpretation of cardiology test results, reviewing consulting specialist notes, obtaining history from the patient and family, examination of the patient, medication reconciliation, managing and/or ordering prescription medications, ordering imaging tests, ordering referral to subspecialty provider(s), educating patient and answering their questions about diagnosis, treatment plan, and goals of treatment, discussing planned follow-up and final documentation of the visit. This time was exclusive of any separately billable procedures for this patient and exclusive of time spent treating any other patients.     This note was generated with the assistance of ambient listening technology. Verbal consent was obtained by the patient and accompanying visitor(s) for the recording of patient appointment to facilitate this note. I attest to having reviewed and edited the generated note for  accuracy, though some syntax or spelling errors may persist. Please contact the author of this note for any clarification.

## 2024-12-11 NOTE — PATIENT INSTRUCTIONS
If you are feeling unwell, we'd like to be the first ones to know here at [facility]! Please give us a call. Same day appointments are our top priority to keep you well and out of the emergency rooms and hospitals. Call [phone] for our direct line. After hours advice is always available. Please call [phone] after hours to speak to the on-call team.      Recommend Covid and RSV vaccines that can be scheduled at your pharmacy of choice.      Please HOLD biotin starting Jan 1st - can resume after recheck TSH Jan 16th     Recommend elevate legs when sitting and wear compression socks/stockings

## 2024-12-11 NOTE — LETTER
"Nadia Draper" Nelson was seen and treated in our emergency department on 12/11/2024.  She may return to school on [unfilled].  [unfilled]    If you have any questions or concerns, please don't hesitate to call.      [unfilled] @[facility]@  "

## 2024-12-11 NOTE — ASSESSMENT & PLAN NOTE
Followed by Pain Med - Reports already w sig pain relief s/p L 4-5 and L5-S1 RFA 12/05/24   Additional Notes: Due to the number and location of these lesions (right upper leg and B/L ankles), I recommend the patient see a general surgeon as well as consider seeing a Podiatrist for evaluation of symmetrical, non tender, non inflamed areas of prominence on the bilateral ankles.   Pt is also complaining of a lipoma on the anterior neck (although I cannot palpate any mass in this area) and I have instructed her to follow up with her PCP for an U/S or other imaging study to evaluate the lesion at the right Supra sterna notch.

## 2024-12-11 NOTE — ASSESSMENT & PLAN NOTE
Look into adding EPIC BANNER to advise no BP - no IV - no blood draw from left arm  (Lymphedema/h/o L breast cancer)

## 2024-12-11 NOTE — LETTER
[facility]  [address] [address]  Phone: [phone]  Fax: [phone]     Subject: Healthcare Notification: Nitroglycerin for Patient with Significant Cardiac History       To Whom It May Concern,         I am writing on behalf of Dr. Elis Wick at [facility] regarding a patient, Ms. Nadia aDmon. Ms. Damon has a significant cardiac history, which may warrant the use of nitroglycerin as needed for symptomatic relief.     This letter serves as a notification of the patients condition and the potential necessity for nitroglycerin to manage acute episodes of chest pain or other related symptoms. Ensuring access to this medication is an important aspect of her ongoing care.     If further information or medical documentation is required, please do not hesitate to contact our office.     Thank you for your attention to this matter and for your continued support in ensuring the well-being of our patients.          Sincerely,      Nathalie Hernandez DNP, MSN, RN Case Manager  [facility]  Ph. [phone]  Fax [phone]

## 2024-12-11 NOTE — ASSESSMENT & PLAN NOTE
Maintains close f/u w [facility] transplant team - also followed by external cardiologist locally, Dr. King

## 2024-12-11 NOTE — LETTER
[facility] [place]  [address] [address]  Phone: [phone]  Fax: [phone]           Subject: Healthcare Notification: Nitroglycerin for Patient with Significant Cardiac History           To Whom It May Concern,       I am writing on behalf of Dr. Elis Wick at [facility] [place] regarding a patient, Ms. Nadia Damon. Ms. Damon has a significant cardiac history, which may warrant the use of nitroglycerin as needed for symptomatic relief.     This letter serves as a notification of the patients condition and the potential necessity for nitroglycerin to manage acute episodes of chest pain or other related symptoms. Ensuring access to this medication is an important aspect of her ongoing care.     If further information or medical documentation is required, please do not hesitate to contact our office.     Thank you for your attention to this matter and for your continued support in ensuring the well-being of our patients.          Sincerely,          Nathalie Hernandez DNP, MSN, RN Case Manager  [facility]  Ph. [phone]  Fax [phone]

## 2024-12-12 ENCOUNTER — LAB VISIT (OUTPATIENT)
Dept: LAB | Facility: HOSPITAL | Age: 69
End: 2024-12-12
Attending: INTERNAL MEDICINE
Payer: MEDICARE

## 2024-12-12 DIAGNOSIS — I50.33 ACUTE ON CHRONIC DIASTOLIC CONGESTIVE HEART FAILURE: ICD-10-CM

## 2024-12-12 LAB
ALBUMIN SERPL BCP-MCNC: 3.3 G/DL (ref 3.5–5.2)
ANION GAP SERPL CALC-SCNC: 10 MMOL/L (ref 8–16)
BASOPHILS # BLD AUTO: 0.02 K/UL (ref 0–0.2)
BASOPHILS NFR BLD: 0.7 % (ref 0–1.9)
BUN SERPL-MCNC: 58 MG/DL (ref 8–23)
CALCIUM SERPL-MCNC: 9.3 MG/DL (ref 8.7–10.5)
CHLORIDE SERPL-SCNC: 103 MMOL/L (ref 95–110)
CO2 SERPL-SCNC: 27 MMOL/L (ref 23–29)
CREAT SERPL-MCNC: 3.9 MG/DL (ref 0.5–1.4)
DIFFERENTIAL METHOD BLD: ABNORMAL
EOSINOPHIL # BLD AUTO: 0.2 K/UL (ref 0–0.5)
EOSINOPHIL NFR BLD: 5.4 % (ref 0–8)
ERYTHROCYTE [DISTWIDTH] IN BLOOD BY AUTOMATED COUNT: 15 % (ref 11.5–14.5)
EST. GFR  (NO RACE VARIABLE): 11.8 ML/MIN/1.73 M^2
GLUCOSE SERPL-MCNC: 82 MG/DL (ref 70–110)
HCT VFR BLD AUTO: 31.8 % (ref 37–48.5)
HGB BLD-MCNC: 10 G/DL (ref 12–16)
IMM GRANULOCYTES # BLD AUTO: 0.01 K/UL (ref 0–0.04)
IMM GRANULOCYTES NFR BLD AUTO: 0.4 % (ref 0–0.5)
LYMPHOCYTES # BLD AUTO: 1.1 K/UL (ref 1–4.8)
LYMPHOCYTES NFR BLD: 40.6 % (ref 18–48)
MCH RBC QN AUTO: 28.5 PG (ref 27–31)
MCHC RBC AUTO-ENTMCNC: 31.4 G/DL (ref 32–36)
MCV RBC AUTO: 91 FL (ref 82–98)
MONOCYTES # BLD AUTO: 0.5 K/UL (ref 0.3–1)
MONOCYTES NFR BLD: 17.4 % (ref 4–15)
NEUTROPHILS # BLD AUTO: 1 K/UL (ref 1.8–7.7)
NEUTROPHILS NFR BLD: 35.5 % (ref 38–73)
NRBC BLD-RTO: 0 /100 WBC
PHOSPHATE SERPL-MCNC: 4.3 MG/DL (ref 2.7–4.5)
PLATELET # BLD AUTO: 221 K/UL (ref 150–450)
PLATELET BLD QL SMEAR: ABNORMAL
PMV BLD AUTO: 10.2 FL (ref 9.2–12.9)
POTASSIUM SERPL-SCNC: 4.8 MMOL/L (ref 3.5–5.1)
PTH-INTACT SERPL-MCNC: 177.6 PG/ML (ref 9–77)
RBC # BLD AUTO: 3.51 M/UL (ref 4–5.4)
SODIUM SERPL-SCNC: 140 MMOL/L (ref 136–145)
WBC # BLD AUTO: 2.76 K/UL (ref 3.9–12.7)

## 2024-12-12 PROCEDURE — 80069 RENAL FUNCTION PANEL: CPT | Mod: HCNC | Performed by: INTERNAL MEDICINE

## 2024-12-12 PROCEDURE — 85025 COMPLETE CBC W/AUTO DIFF WBC: CPT | Mod: HCNC | Performed by: INTERNAL MEDICINE

## 2024-12-12 PROCEDURE — 83970 ASSAY OF PARATHORMONE: CPT | Mod: HCNC | Performed by: INTERNAL MEDICINE

## 2024-12-12 PROCEDURE — 36415 COLL VENOUS BLD VENIPUNCTURE: CPT | Mod: HCNC | Performed by: INTERNAL MEDICINE

## 2024-12-13 ENCOUNTER — TELEPHONE (OUTPATIENT)
Dept: PRIMARY CARE CLINIC | Facility: CLINIC | Age: 69
End: 2024-12-13
Payer: MEDICARE

## 2024-12-13 NOTE — TELEPHONE ENCOUNTER
Long talk with patient by phone today.  Discussed family issues she is concerned with.  Scheduled appt in January as she requests; January 10th, will use [facility].

## 2024-12-16 NOTE — TELEPHONE ENCOUNTER
Letter for pt's [facility] team completed on 12.12.2024 and mailed to pt per pt's request.

## 2024-12-19 ENCOUNTER — OFFICE VISIT (OUTPATIENT)
Dept: NEPHROLOGY | Facility: CLINIC | Age: 69
End: 2024-12-19
Payer: MEDICARE

## 2024-12-19 ENCOUNTER — TELEPHONE (OUTPATIENT)
Dept: PRIMARY CARE CLINIC | Facility: CLINIC | Age: 69
End: 2024-12-19
Payer: MEDICARE

## 2024-12-19 VITALS
DIASTOLIC BLOOD PRESSURE: 78 MMHG | HEIGHT: 62 IN | HEART RATE: 93 BPM | SYSTOLIC BLOOD PRESSURE: 130 MMHG | WEIGHT: 148.13 LBS | BODY MASS INDEX: 27.26 KG/M2

## 2024-12-19 DIAGNOSIS — N18.5 STAGE 5 CHRONIC KIDNEY DISEASE: Primary | ICD-10-CM

## 2024-12-19 PROCEDURE — 99999 PR PBB SHADOW E&M-EST. PATIENT-LVL III: CPT | Mod: PBBFAC,HCNC,, | Performed by: INTERNAL MEDICINE

## 2024-12-19 RX ORDER — NIFEDIPINE 30 MG/1
30 TABLET, EXTENDED RELEASE ORAL DAILY
COMMUNITY
Start: 2024-12-11

## 2024-12-19 NOTE — PROGRESS NOTES
NEPHROLOGY CLINIC FOLLOWUP NOTE  Date of clinic visit: 12/19/24     REASON FOR FOLLOWUP AND CHIEF COMPLAINT: nephrotic syndrome, CKD, post heart transplant, HTN, nephrolithiasis, hypercalcemia     HISTORY OF PRESENT ILLNESS: Ms. Damon is a 69-year-old female with a history of CKD stage 5 (with some s Cr fluctuations) (secondary to kidney biopsy (11/10/20) proven calcineurin inhibitor nephrotoxicity (due to cyclosporine) and hypertensive nephrosclerosis), nephrolithiasis (likely mixed stones), and heart transplant in 1993 (is on cyclosporine), who presents for close f/u. Pt was last seen by me 2 months ago. Pt had worsened fluid gain after diuretics were held during a hospital stay for a stroke in July 2024. Pt has not done quite well since then. On prior renal visits lasix was re-started. On her last visit lasix dose was further increased. Pt has chronic hyperkalemia likely as a result of CSA side effect. Losartan was also held. Pt is taking veltassa      On this visit, pt feels better physically, SOB and leg swelling have improved. Pt has followed with medical advice. Pt however feels poorly. Pt reports being chronically ill. Pt is worried about her many medical issues. She expressed to me her disappointment that her family does not check on her. Labs and meds reviewed.     To review, Cr has not improved significantly. Pt denies taking any NSAIds, no abx, no recent infections. No other pertinent issues to explain worsened s Cr. BP has been low and PCP held hydralazine and coreg. Pt does admit to large water intake.     To review: Past mild, persistent hypercalcemia reviewed. Previously suspected related to primary hyperparathyroidism (HPT) is suspected. Sestamibi nuclear scan of the neck did not show any adenomas. Her risk factors for kidney stones include hypercalcemia and being on cyclosporine for prevention of heart transplant rejection, which causes hyperuricemia, and prior intake of vitamin C.. Pt has a h/o  of osteopenia.      To review, pt also has breast cancer (she did have persistent mild hypercalcemia in the past). Hem/onc notes were reviewed. 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 Pt has had further complication, requiring hospital admission including pancytopenia, neutropenic fever, axillary cellulitis, and C diff colitis.             PAST MEDICAL HISTORY:  1. CKD stage V. Nephrotic syndrome. Due to chronic calcineurin inhibitor toxicity due to taking cyclosporine, kidney biopsy was done on 11/10/21  2. Hypertension.  3. Heart transplant for cardiomyopathy in 1993, the patient has been on chronic  cyclosporine treatment.  4. Carpal tunnel syndrome.  5. Fibromyalgia.  6. GERD.  7. Bell's palsy.  8. Depression.     PAST SURGICAL HISTORY: Reviewed as above, unchanged.     MEDICATIONS: Reviewed     Current Outpatient Medications:     aspirin (ECOTRIN) 81 MG EC tablet, Take 1 tablet (81 mg total) by mouth once daily., Disp: , Rfl:     calcitRIOL (ROCALTROL) 0.25 MCG Cap, Take 1 capsule (0.25 mcg total) by mouth once daily., Disp: 30 capsule, Rfl: 11    carvediloL (COREG) 3.125 MG tablet, Take 1 tablet (3.125 mg total) by mouth 2 (two) times daily with meals. Hold parameters: SBP less than 110 and/or DBP less than 75, Disp: 180 tablet, Rfl: 3    furosemide (LASIX) 20 MG tablet, Take 2 tablets (40 mg total) by mouth once daily. HOLD UNTIL FOLLOW UP WITH PCP, Disp: 60 tablet, Rfl: 11    NIFEdipine (PROCARDIA-XL) 30 MG (OSM) 24 hr tablet, Take 30 mg by mouth once daily., Disp: , Rfl:     nitroGLYCERIN (NITROSTAT) 0.4 MG SL tablet, PLACE 1 TABLET ABLET UNDER THE TONGUE 5 MINS. AS NEEDED FOR CHEST PAIN FOR UP TO 3 DOSES.IF CONTINUED HEART PAIN/PRESSURE AFTER, Disp: 100 tablet, Rfl: 0    ondansetron (ZOFRAN) 4 MG tablet, Take 4 mg by mouth as needed for Nausea., Disp: , Rfl:     pantoprazole (PROTONIX) 20 MG tablet, TAKE 1  TABLET ONE TIME DAILY, Disp: 90 tablet, Rfl: 3    polyethylene glycol (GLYCOLAX) 17 gram PwPk, Take 17 g by mouth once daily., Disp: , Rfl:     pregabalin (LYRICA) 25 MG capsule, Take 1 capsule (25 mg total) by mouth every evening., Disp: 90 capsule, Rfl: 0    sodium bicarbonate 650 MG tablet, Take 1 tablet (650 mg total) by mouth 2 (two) times daily., Disp: 60 tablet, Rfl: 1    temazepam (RESTORIL) 30 mg capsule, Take 1 capsule (30 mg total) by mouth nightly as needed for Insomnia. FOR INSOMNIA, Disp: 90 capsule, Rfl: 1    tiZANidine (ZANAFLEX) 2 MG tablet, Take 1 tablet (2 mg total) by mouth every evening., Disp: 90 tablet, Rfl: 1    vitamin E 400 UNIT capsule, Take 400 Units by mouth once daily., Disp: , Rfl:     zolpidem (AMBIEN) 5 MG Tab, Take 1 tablet (5 mg total) by mouth every evening., Disp: 90 tablet, Rfl: 1    cycloSPORINE modified, NEORAL, (NEORAL) 25 MG capsule, Take 3 capsules (75 mg total) by mouth 2 (two) times daily., Disp: 540 capsule, Rfl: 1         SOCIAL HISTORY: Reviewed. Negative for smoking. No alcohol use.      REVIEW OF SYSTEMS: No recent hospitalizations.  GENERAL: Negative.  HEAD, EYES, EARS, NOSE, AND THROAT: Negative.  CARDIAC: Negative.  PULMONARY: Negative.  GASTROINTESTINAL: Negative.  GENITOURINARY: Negative.  PSYCHOLOGICAL: Negative.  NEUROLOGICAL: Negative.  ENDOCRINE: Negative.  HEMATOLOGIC AND ONCOLOGIC: Negative.  INFECTIOUS DISEASE: Negative.  The rest of the review of systems negative.     PHYSICAL EXAMINATION:  VITAL SIGNS: /78, Pulse is 93, weight is 67.2 Kg, from 69.2 Kg, from 70.9 Kg, from 70.8 Kg, from 72.2 Kg,   GENERAL: She is cooperative, pleasant, in no acute distress.   Speech and thought process appropriate, normal.  HEENT: Mucous membranes moist.  NECK: Neck JVD. Normal hearing.  ABDOMEN: Soft, nontender.  EXTREMITIES: 1+ edema, from 3+ last visit     LABS: Reviewed.   BMP  Lab Results   Component Value Date     12/12/2024    K 4.8 12/12/2024      12/12/2024    CO2 27 12/12/2024    BUN 58 (H) 12/12/2024    CREATININE 3.9 (H) 12/12/2024    CALCIUM 9.3 12/12/2024    ANIONGAP 10 12/12/2024    EGFRNORACEVR 11.8 (A) 12/12/2024     Lab Results   Component Value Date    WBC 2.76 (L) 12/12/2024    HGB 10.0 (L) 12/12/2024    HCT 31.8 (L) 12/12/2024    MCV 91 12/12/2024     12/12/2024       Lab Results   Component Value Date    .6 (H) 12/12/2024    CALCIUM 9.3 12/12/2024    CAION 1.13 09/05/2018    PHOS 4.3 12/12/2024              Urine protein/cr 0.54 g, from 0.32 g, from 0.49 g, from 2.2 g, 6.0 g  U/a: no protein (from 3+), no blood, 4 RBC's, no casts     Complements C3 and C4 both normal     To review older labs:   24 hour urine: Ca not measured, uric acid 138, Oxalate 20, citrate 203  U/a unremarkable  Urine Cx: neg   Urine P/Cr 740 mg     KUB: unremarkable, except for some constipation     Renal u/s: FINDINGS: 10/22/20  Kidneys measure 10.3 and 9.7 cm in length on the right left respectively.  Cortex is smoothly marginated well maintained with mild diffuse increased echotexture.  Appearance suggest medical renal disease.  Two simple cyst identified within the right kidney.  Upper pole cyst 1.4 cm maximum diameter and inferior pole cyst 2.2 cm maximum diameter.  There is 9 mm simple cyst within the left inferior pole.  Resistive indices are 0.66 and 0.76 on the right left respectively.  Urinary bladder partially distended without focal or diffuse wall thickening.     CT abd: The left kidney appears slightly small.  Right kidney is grossly normal in size.  No obvious hydronephrosis or nephrolithiasis     Sestamibi nuclear scan of the neck: 8/29/19: no adenomas        Kidney biopsy from 11/10/21:                   ASSESSMENT AND PLAN: This is a 69-year-old female who presents for CKD follow up:  The impression is as follows:     1. Renal: s Cr stable, within baseline range  Overall, slowly progressive CKD, now at stage 5  Kidney biopsy (2021) proven  calcineurin inhibitor nephropathy  Nephrotic syndrome: stable, good response to losartan in the past: proteinuria further improved from 6 to 2.2 g, and now to < 1 g, stable  Unable to give Losartan due to hyperkalemia     Complications of CKD:  K, hyperkalemia controlled with veltassa, continue and take qd  in the past. Hyperkalemia likely due to CSA + CKD  Na normal  Acid base stable  Ca normal  PO4 normal  Secondary hyperparathyroidism: moderate. On calcitriol, iPTH has improved  Anemia:mild, multifactorial, seeing hem/onc  Fluid overload: due to large water in take + CKD. Stable and improved. Pt drinking less water, continue moderating fluid intake < 1-1.5 L/day,  Wt loss was noted  Good response to increased dose of lasix  Advised pt that she will likely need dialysis in future.  Agreed to see vascular for preemptive placement of AVF. Was referred to Dr. Guzman's group  Was referred to [facility]  Pt is not interested in PD. Wants HD.  No immediate indications for dialysis     Kidney biopsy showed:  Damage by HTN (hypertensive nephrosclerosis) as well as calcineurin inhibitor toxicity due to taking cyclosporine to prevent heart transplant rejection. There was 50% chronic interstitial fibrosis.  Pt has been advised of the kidney biopsy in layman's language  She was specifically advised NOT to stop cyclosporine of change the dose on her own.     2. HTN: BP was low. Better now, normal and controlled  Agree with holding hydralazine and nifedipine     3. Stroke: occurred in July 2024.  Has right sided weakness     4. Cardiac: h/o fo CHF: fluid gain has returned. Will re-start lasix.  H/o of heart transplant 1993  On CSA  On coreg     5. Nephrolithiasis: no new stones. To review:  Has multiple risk factors for kidney stones: (hyperuricemia from cyclosporine, diet rich in uric acid, previously taking Vit C, vit D, and calcium,, CKD, and having primary hyperparathyroidism)  Stones are likely mixed ca oxalate and  uric acid kidney stones  No longer taking Vit C  No longer takes Vit D and Ca.   Mild hyperuricemia, from cyclosporine.  F/u CT did not show any stones  U/s showed non-obstructive 2 R stones, relatively large  24 hour urine for kidney stone stratification shows hypocitraturia  Pt was advised NOT TO STOP cyclosporine.       6. H/o of breast cancer: since her last visit with us (she did have persistent mild hypercalcemia in the past). Hem/onc notes were reviewed. 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 Pt has had further complication, requiring hospital admission including pancytopenia, neutropenic fever, axillary cellulitis, and C diff colitis.  Will defer to hem/onc     7. Anemia: normocytic. Likely multifactorial: due to CKD and cancer.  Advised pt to have PRBC transfusion. Last transfusion was in Nov 2021. Pt declined  Has f/u with hem/onc on 1/20/21. Please evaluate for epogen and IV iron therapy.              PLANS AND RECOMMENDATIONS:   As discussed above  Opportunity for questions provided  Complicated case, multiple issues addressed  RTC 2 months. OK to overbook  Total time spent 40 minutes. Extensive time spent including the time to review the records, the patient evaluation, documentation, face-to-face  discussion with the patient. More than 50% of the time was spent in counseling  and coordination of care. Level V visit.     Carter Crawford MD

## 2024-12-20 ENCOUNTER — INFUSION (OUTPATIENT)
Dept: INFUSION THERAPY | Facility: HOSPITAL | Age: 69
End: 2024-12-20
Attending: INTERNAL MEDICINE
Payer: MEDICARE

## 2024-12-20 ENCOUNTER — OFFICE VISIT (OUTPATIENT)
Dept: HEMATOLOGY/ONCOLOGY | Facility: CLINIC | Age: 69
End: 2024-12-20
Payer: MEDICARE

## 2024-12-20 ENCOUNTER — LAB VISIT (OUTPATIENT)
Dept: LAB | Facility: HOSPITAL | Age: 69
End: 2024-12-20
Attending: NURSE PRACTITIONER
Payer: MEDICARE

## 2024-12-20 VITALS
SYSTOLIC BLOOD PRESSURE: 123 MMHG | SYSTOLIC BLOOD PRESSURE: 123 MMHG | TEMPERATURE: 98 F | HEIGHT: 62 IN | HEART RATE: 102 BPM | DIASTOLIC BLOOD PRESSURE: 81 MMHG | WEIGHT: 148.5 LBS | OXYGEN SATURATION: 100 % | BODY MASS INDEX: 27.33 KG/M2 | DIASTOLIC BLOOD PRESSURE: 81 MMHG

## 2024-12-20 DIAGNOSIS — D05.12 DUCTAL CARCINOMA IN SITU (DCIS) OF LEFT BREAST: ICD-10-CM

## 2024-12-20 DIAGNOSIS — M81.8 OTHER OSTEOPOROSIS WITHOUT CURRENT PATHOLOGICAL FRACTURE: Primary | ICD-10-CM

## 2024-12-20 DIAGNOSIS — D63.1 ANEMIA ASSOCIATED WITH STAGE 4 CHRONIC RENAL FAILURE: Primary | ICD-10-CM

## 2024-12-20 DIAGNOSIS — D63.1 ANEMIA ASSOCIATED WITH STAGE 5 CHRONIC RENAL FAILURE: Chronic | ICD-10-CM

## 2024-12-20 DIAGNOSIS — N18.5 ANEMIA ASSOCIATED WITH STAGE 5 CHRONIC RENAL FAILURE: ICD-10-CM

## 2024-12-20 DIAGNOSIS — N18.4 ANEMIA ASSOCIATED WITH STAGE 4 CHRONIC RENAL FAILURE: Chronic | ICD-10-CM

## 2024-12-20 DIAGNOSIS — D63.1 ANEMIA ASSOCIATED WITH STAGE 5 CHRONIC RENAL FAILURE: ICD-10-CM

## 2024-12-20 DIAGNOSIS — N18.4 ANEMIA ASSOCIATED WITH STAGE 4 CHRONIC RENAL FAILURE: Primary | ICD-10-CM

## 2024-12-20 DIAGNOSIS — D63.1 ANEMIA ASSOCIATED WITH STAGE 4 CHRONIC RENAL FAILURE: Chronic | ICD-10-CM

## 2024-12-20 DIAGNOSIS — N18.5 ANEMIA ASSOCIATED WITH STAGE 5 CHRONIC RENAL FAILURE: Chronic | ICD-10-CM

## 2024-12-20 LAB
ALBUMIN SERPL BCP-MCNC: 3.4 G/DL (ref 3.5–5.2)
ALP SERPL-CCNC: 103 U/L (ref 40–150)
ALT SERPL W/O P-5'-P-CCNC: 26 U/L (ref 10–44)
ANION GAP SERPL CALC-SCNC: 13 MMOL/L (ref 8–16)
AST SERPL-CCNC: 44 U/L (ref 10–40)
BASOPHILS # BLD AUTO: 0.01 K/UL (ref 0–0.2)
BASOPHILS NFR BLD: 0.4 % (ref 0–1.9)
BILIRUB SERPL-MCNC: 0.4 MG/DL (ref 0.1–1)
BUN SERPL-MCNC: 55 MG/DL (ref 8–23)
CALCIUM SERPL-MCNC: 9.7 MG/DL (ref 8.7–10.5)
CHLORIDE SERPL-SCNC: 106 MMOL/L (ref 95–110)
CO2 SERPL-SCNC: 25 MMOL/L (ref 23–29)
CREAT SERPL-MCNC: 4.4 MG/DL (ref 0.5–1.4)
DIFFERENTIAL METHOD BLD: ABNORMAL
EOSINOPHIL # BLD AUTO: 0.1 K/UL (ref 0–0.5)
EOSINOPHIL NFR BLD: 4.3 % (ref 0–8)
ERYTHROCYTE [DISTWIDTH] IN BLOOD BY AUTOMATED COUNT: 15.1 % (ref 11.5–14.5)
EST. GFR  (NO RACE VARIABLE): 10 ML/MIN/1.73 M^2
FERRITIN SERPL-MCNC: 76 NG/ML (ref 20–300)
GLUCOSE SERPL-MCNC: 96 MG/DL (ref 70–110)
HCT VFR BLD AUTO: 29.1 % (ref 37–48.5)
HGB BLD-MCNC: 9.4 G/DL (ref 12–16)
IMM GRANULOCYTES # BLD AUTO: 0.01 K/UL (ref 0–0.04)
IMM GRANULOCYTES NFR BLD AUTO: 0.4 % (ref 0–0.5)
IRON SERPL-MCNC: 79 UG/DL (ref 30–160)
LYMPHOCYTES # BLD AUTO: 1.1 K/UL (ref 1–4.8)
LYMPHOCYTES NFR BLD: 39.9 % (ref 18–48)
MCH RBC QN AUTO: 28.9 PG (ref 27–31)
MCHC RBC AUTO-ENTMCNC: 32.3 G/DL (ref 32–36)
MCV RBC AUTO: 90 FL (ref 82–98)
MONOCYTES # BLD AUTO: 0.4 K/UL (ref 0.3–1)
MONOCYTES NFR BLD: 15.6 % (ref 4–15)
NEUTROPHILS # BLD AUTO: 1.1 K/UL (ref 1.8–7.7)
NEUTROPHILS NFR BLD: 39.4 % (ref 38–73)
NRBC BLD-RTO: 0 /100 WBC
PLATELET # BLD AUTO: 170 K/UL (ref 150–450)
PMV BLD AUTO: 9.4 FL (ref 9.2–12.9)
POTASSIUM SERPL-SCNC: 4.2 MMOL/L (ref 3.5–5.1)
PROT SERPL-MCNC: 7.6 G/DL (ref 6–8.4)
RBC # BLD AUTO: 3.25 M/UL (ref 4–5.4)
SATURATED IRON: 23 % (ref 20–50)
SODIUM SERPL-SCNC: 144 MMOL/L (ref 136–145)
TOTAL IRON BINDING CAPACITY: 345 UG/DL (ref 250–450)
TRANSFERRIN SERPL-MCNC: 233 MG/DL (ref 200–375)
WBC # BLD AUTO: 2.76 K/UL (ref 3.9–12.7)

## 2024-12-20 PROCEDURE — 82728 ASSAY OF FERRITIN: CPT | Mod: HCNC | Performed by: NURSE PRACTITIONER

## 2024-12-20 PROCEDURE — 99999 PR PBB SHADOW E&M-EST. PATIENT-LVL IV: CPT | Mod: PBBFAC,HCNC,, | Performed by: NURSE PRACTITIONER

## 2024-12-20 PROCEDURE — 80053 COMPREHEN METABOLIC PANEL: CPT | Mod: HCNC | Performed by: NURSE PRACTITIONER

## 2024-12-20 PROCEDURE — 85025 COMPLETE CBC W/AUTO DIFF WBC: CPT | Mod: HCNC | Performed by: NURSE PRACTITIONER

## 2024-12-20 PROCEDURE — 36415 COLL VENOUS BLD VENIPUNCTURE: CPT | Mod: HCNC | Performed by: NURSE PRACTITIONER

## 2024-12-20 PROCEDURE — 83540 ASSAY OF IRON: CPT | Mod: HCNC | Performed by: NURSE PRACTITIONER

## 2024-12-20 PROCEDURE — 96372 THER/PROPH/DIAG INJ SC/IM: CPT | Mod: HCNC

## 2024-12-20 PROCEDURE — 63600175 PHARM REV CODE 636 W HCPCS: Mod: JZ,EC,JG,HCNC | Performed by: NURSE PRACTITIONER

## 2024-12-20 RX ADMIN — EPOETIN ALFA-EPBX 20000 UNITS: 20000 INJECTION, SOLUTION INTRAVENOUS; SUBCUTANEOUS at 03:12

## 2024-12-20 NOTE — ASSESSMENT & PLAN NOTE
Hg 9.4. Iron levels pending    Proceed with Retacrit today. F/u 1 month with repeat labs for consideration of Retacrit if hg <10

## 2024-12-20 NOTE — ASSESSMENT & PLAN NOTE
Cont veltassa, bicarb, close f/u w nephrology - in discussion regarding whether or not would want dialysis

## 2024-12-20 NOTE — PROGRESS NOTES
Subjective:       Patient ID: Nadia Damon is a 69 y.o. female.    Chief Complaint: review labs. Consideration of Retacrit    HPI: 69 y.o female with h/o of CVA 2024, heart transplant in  (on anti-rejection medication), CKD V, triple negative breast ca with lymph node involovement. Initiation of chemotherapy 2021 (Taxotere/Cytoxan) with Udenyca 2021. Unfortunately chemotherapy was complicated by pancytopenia and neutropenic fever resulting in hospitalization x 2. Patient decided on no further chemotherapy. S/p radiation completed 2022    10/31/2022 underwent right breast biopsy due to abnormal findings on mammogram with benign pathology. She continues follow up with breast cancer survivorship     Today she is here for lab review and consideration for EPO therapy for anemia of CKD. She feels well overall.   Social History     Socioeconomic History    Marital status: Single    Number of children: 2    Highest education level: 11th grade   Occupational History    Occupation: Retired   Tobacco Use    Smoking status: Never     Passive exposure: Never    Smokeless tobacco: Never   Substance and Sexual Activity    Alcohol use: Never     Alcohol/week: 0.0 standard drinks of alcohol    Drug use: No    Sexual activity: Not Currently     Partners: Male     Birth control/protection: See Surgical Hx   Other Topics Concern    Are you pregnant or think you may be? No    Breast-feeding No   Social History Narrative    Single. 2 children , 1  at 30 yoa  2014 strep throat -  pneumonia and renal complications after not completing course of AB. Other child lives in [place], [place]. Has a cousin locally that could help in an emergency. 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 Caffeine intake =- 1 cola a day. No coffee, + occasional tea, avoids caffeine especially at night. Still drives. She does not have a Living Will or Advanced directive.      Social Drivers of Health      Financial Resource Strain: Low Risk  (11/15/2024)    Overall Financial Resource Strain (CARDIA)     Difficulty of Paying Living Expenses: Not hard at all   Food Insecurity: No Food Insecurity (11/15/2024)    Hunger Vital Sign     Worried About Running Out of Food in the Last Year: Never true     Ran Out of Food in the Last Year: Never true   Recent Concern: Food Insecurity - Food Insecurity Present (11/13/2024)    Hunger Vital Sign     Worried About Running Out of Food in the Last Year: Never true     Ran Out of Food in the Last Year: Sometimes true   Transportation Needs: No Transportation Needs (11/15/2024)    TRANSPORTATION NEEDS     Transportation : No   Physical Activity: Inactive (8/27/2024)    Exercise Vital Sign     Days of Exercise per Week: 0 days     Minutes of Exercise per Session: 0 min   Stress: No Stress Concern Present (11/15/2024)    [employer] - Occupational Stress Questionnaire     Feeling of Stress : Not at all   Recent Concern: Stress - Stress Concern Present (11/13/2024)    [employer] - Occupational Stress Questionnaire     Feeling of Stress : To some extent   Housing Stability: Low Risk  (11/15/2024)    Housing Stability Vital Sign     Unable to Pay for Housing in the Last Year: No     Homeless in the Last Year: No       Past Medical History:   Diagnosis Date    Abdominal wall hernia     CT Renal 6/11/2018---Small fat containing superior ventral abdominal wall hernia at the epicardial pacing lead site.    Abnormal mammogram 10/12/2021    Acute cystitis without hematuria 05/10/2022    Acute ischemic right middle cerebral artery (MCA) stroke 07/20/2024    Adverse drug reaction 11/12/2024    GRACE (acute kidney injury) 11/22/2021    Anxiety     Aphasia 07/19/2024    Arthritis     ZEN HIPS    Breast cancer in female 08/2021    LEFT BREAST    Breast mass, right 11/13/2024    RIGHT BREAST MASS (Problem)      Bronchitis 08/18/2016    Never  smoked      C. difficile colitis 11/29/2021    Cellulitis of axilla, left 12/23/2021    Chronic diastolic heart failure 12/16/2021    Chronic kidney disease     stage 4, GFR 15-29 ml/min    Chronic midline low back pain without sciatica 06/18/2018    CKD (chronic kidney disease) stage 4, GFR 15-29 ml/min 04/20/2016    US Retro   5/16/2018---Mild cortical thinning and increased cortical echogenicity.  Findings can be seen with medical renal disease.  8/31/216--- Echogenic kidneys with diffuse cortical thinning suggesting  medical renal disease. 2 complex right renal cortical cysts.      Closed nondisplaced fracture of distal phalanx of left great toe with routine healing 10/22/2018    Coronary artery disease 1993    heart transplant    COVID-19 in immunocompromised patient 02/26/2024    Cystitis 05/10/2022    Depression     Encounter for blood transfusion     Encounter for palliative care 10/14/2024    Fibromyalgia     on Lyrica    Heart failure     native heart cardiomyopathy    Heart transplanted 1993    due to cardiomyopathy    History of hyperparathyroidism; Hyperparathyroidism, secondary renal     PT DENIES    Hypertension     Immune disorder     anti rejection meds    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    Impaired mobility 07/28/2022    Iron deficiency anemia 08/15/2017    Kidney stones     passed per pt    Obesity     Obesity (BMI 30.0-34.9) 07/22/2019    Other osteoporosis without current pathological fracture 08/30/2019    Other pancytopenia 05/06/2024    Parotitis, acute 09/19/2023    Pharyngitis 01/09/2019    Pneumonia due to infectious organism 03/14/2024    PONV (postoperative nausea and vomiting)     Severe sepsis 11/22/2021    Shingles 2003 approx    left leg    Stage 4 chronic kidney disease 11/22/2021    Subclinical hypothyroidism 06/16/2023    Thrombocytopenia, unspecified 11/29/2021    Trouble in sleeping     Unresponsiveness 11/13/2024    Urinary incontinence        Family  History   Problem Relation Name Age of Onset    Cancer Mother  38        breast    Breast cancer Mother      Breast cancer Maternal Grandmother      Heart disease Maternal Grandmother      Hypertension Son      Cataracts Cousin      Diabetes Neg Hx      Stroke Neg Hx      Kidney disease Neg Hx      Asthma Neg Hx      COPD Neg Hx      Melanoma Neg Hx      Hyperlipidemia Neg Hx         Past Surgical History:   Procedure Laterality Date    bladder implant Right 06/11/2024    BLADDER SURGERY  2015 approx    mesh - Dr Everett then 2nd reconstructive sx Dr Onofre    BREAST BIOPSY Bilateral     NEGATIVE    BREAST BIOPSY Right 10/31/2022    benign    BREAST LUMPECTOMY Left 2021    BREAST SURGERY Left 09/28/2015    Bx - benign    BREAST SURGERY Right 12/2015    Bx benign    CARDIAC PACEMAKER REMOVAL Left 06/26/2014    Pacer defirillator removed. Put in 1993 aat time of heart transplant    CARPAL TUNNEL RELEASE Left 03/03/2015    Dr. Hall    COLONOSCOPY N/A 02/25/2021    Procedure: COLONOSCOPY;  Surgeon: Freida Ramirez MD;  Location: [facility] ENDO;  Service: Endoscopy;  Laterality: N/A;    CYSTOCELE REPAIR      Twice with mesh removal    EPIDURAL STEROID INJECTION INTO CERVICAL SPINE N/A 02/02/2023    Procedure: T11/T12 IL HELLEN;  Surgeon: Jassi Pierre MD;  Location: [facility] PAIN MGT;  Service: Pain Management;  Laterality: N/A;    EPIDURAL STEROID INJECTION INTO CERVICAL SPINE N/A 9/16/2024    Procedure: T11/T12 IL HELLEN;  Surgeon: Jassi Pierre MD;  Location: [facility] PAIN [facility]GT;  Service: Pain Management;  Laterality: N/A;    HEART TRANSPLANT  1993    HERNIA REPAIR Right 1971 approx    Inguinal    HYSTERECTOMY  1983    vag hyst /LSO     IMPLANTATION OF PERMANENT SACRAL NERVE STIMULATOR N/A 06/11/2024    Procedure: INSERTION, NEUROSTIMULATOR, PERMANENT, SACRAL;  Surgeon: Sami Cochran MD;  Location: [facility] OR;  Service: Urology;  Laterality: N/A;    INCISION AND DRAINAGE OF ABSCESS Left 12/24/2021    Procedure:  INCISION AND DRAINAGE, ABSCESS;  Surgeon: Joseph Longo MD;  Location: [facility] OR;  Service: General;  Laterality: Left;    INJECTION OF ANESTHETIC AGENT AROUND MEDIAL BRANCH NERVES INNERVATING LUMBAR FACET JOINT Right 10/19/2022    Procedure: Right L4/L5 and L5/S1 MBB;  Surgeon: Jassi Pierre MD;  Location: [facility] PAIN MGT;  Service: Pain Management;  Laterality: Right;    INJECTION OF ANESTHETIC AGENT AROUND MEDIAL BRANCH NERVES INNERVATING LUMBAR FACET JOINT Right 11/09/2022    Procedure: Right L4/L5 and L5/S1 MBB;  Surgeon: Jassi Pierre MD;  Location: [facility] PAIN MGT;  Service: Pain Management;  Laterality: Right;    INJECTION OF ANESTHETIC AGENT INTO SACROILIAC JOINT Right 08/22/2022    Procedure: Right SIJ Injection Right L5/S1 Facte Injection;  Surgeon: Jassi Pierre MD;  Location: [facility] PAIN MGT;  Service: Pain Management;  Laterality: Right;    INSERTION OF TUNNELED CENTRAL VENOUS CATHETER (CVC) WITH SUBCUTANEOUS PORT N/A 11/09/2021    Procedure: [id number];  Surgeon: Christoph Douglas MD;  Location: [facility] OR;  Service: General;  Laterality: N/A;    RADIOFREQUENCY ABLATION Right 12/5/2024    Procedure: Right L4-5 and L5-S1 RFA;  Surgeon: Jassi Pierre MD;  Location: [facility] PAIN MGT;  Service: Pain Management;  Laterality: Right;    RADIOFREQUENCY THERMOCOAGULATION Right 12/07/2022    Procedure: Right L4/L5 and L5/S1 Lumbar RFA;  Surgeon: Jassi Pierre MD;  Location: [facility] PAIN MGT;  Service: Pain Management;  Laterality: Right;    REMOVAL OF VASCULAR ACCESS PORT      ROBOT-ASSISTED LAPAROSCOPIC ABDOMINAL SACROCOLPOPEXY N/A 08/10/2023    Procedure: ROBOTIC SACROCOLPOPEXY, ABDOMEN;  Surgeon: PRANAY Villalobos MD;  Location: [facility] OR;  Service: OB/GYN;  Laterality: N/A;    ROBOT-ASSISTED LAPAROSCOPIC OOPHORECTOMY Right 08/10/2023    Procedure: ROBOTIC OOPHORECTOMY;  Surgeon: PRANAY Villalobos MD;  Location: [facility] OR;  Service: OB/GYN;  Laterality: Right;    SENTINEL LYMPH NODE BIOPSY  Left 10/12/2021    Procedure: BIOPSY, LYMPH NODE, SENTINEL;  Surgeon: Christoph Douglas MD;  Location: [facility] OR;  Service: General;  Laterality: Left;    TOE SURGERY      XI ROBOTIC URETHROPEXY N/A 08/10/2023    Procedure: XI ROBOTIC URETHROPEXY;  Surgeon: PRANAY Villalobos MD;  Location: [facility] OR;  Service: OB/GYN;  Laterality: N/A;       Review of Systems   Constitutional:  Negative for activity change, appetite change, chills, fatigue, fever and unexpected weight change.   HENT:  Negative for congestion, mouth sores, nosebleeds, sore throat, trouble swallowing and voice change.    Eyes:  Negative for visual disturbance.   Respiratory:  Negative for cough, chest tightness, shortness of breath and wheezing.    Cardiovascular:  Negative for chest pain, palpitations and leg swelling.   Gastrointestinal:  Negative for abdominal distention, abdominal pain, anal bleeding, blood in stool, constipation, diarrhea, nausea and vomiting.   Genitourinary:  Negative for difficulty urinating, dysuria and hematuria.   Musculoskeletal:  Positive for back pain (sees pain management). Negative for arthralgias and myalgias. Gait problem: utizlies cane, s/p CVA 7/2024.       Breast pain s/p mammogram   Skin:  Negative for pallor, rash and wound.   Neurological:  Negative for dizziness, syncope, weakness and headaches.   Hematological:  Negative for adenopathy. Does not bruise/bleed easily.   Psychiatric/Behavioral:  The patient is nervous/anxious.          Medication List with Changes/Refills   Current Medications    ASPIRIN (ECOTRIN) 81 MG EC TABLET    Take 1 tablet (81 mg total) by mouth once daily.    CALCITRIOL (ROCALTROL) 0.25 MCG CAP    Take 1 capsule (0.25 mcg total) by mouth once daily.    CARVEDILOL (COREG) 3.125 MG TABLET    Take 1 tablet (3.125 mg total) by mouth 2 (two) times daily with meals. Hold parameters: SBP less than 110 and/or DBP less than 75    CYCLOSPORINE MODIFIED, NEORAL, (NEORAL) 25 MG CAPSULE    Take 3 capsules  (75 mg total) by mouth 2 (two) times daily.    FUROSEMIDE (LASIX) 20 MG TABLET    Take 2 tablets (40 mg total) by mouth once daily. HOLD UNTIL FOLLOW UP WITH PCP    NIFEDIPINE (PROCARDIA-XL) 30 MG (OSM) 24 HR TABLET    Take 30 mg by mouth once daily.    NITROGLYCERIN (NITROSTAT) 0.4 MG SL TABLET    PLACE 1 TABLET ABLET UNDER THE TONGUE 5 MINS. AS NEEDED FOR CHEST PAIN FOR UP TO 3 DOSES.IF CONTINUED HEART PAIN/PRESSURE AFTER    ONDANSETRON (ZOFRAN) 4 MG TABLET    Take 4 mg by mouth as needed for Nausea.    PANTOPRAZOLE (PROTONIX) 20 MG TABLET    TAKE 1 TABLET ONE TIME DAILY    POLYETHYLENE GLYCOL (GLYCOLAX) 17 GRAM PWPK    Take 17 g by mouth once daily.    PREGABALIN (LYRICA) 25 MG CAPSULE    Take 1 capsule (25 mg total) by mouth every evening.    SODIUM BICARBONATE 650 MG TABLET    Take 1 tablet (650 mg total) by mouth 2 (two) times daily.    TEMAZEPAM (RESTORIL) 30 MG CAPSULE    Take 1 capsule (30 mg total) by mouth nightly as needed for Insomnia. FOR INSOMNIA    TIZANIDINE (ZANAFLEX) 2 MG TABLET    Take 1 tablet (2 mg total) by mouth every evening.    VITAMIN E 400 UNIT CAPSULE    Take 400 Units by mouth once daily.    ZOLPIDEM (AMBIEN) 5 MG TAB    Take 1 tablet (5 mg total) by mouth every evening.     Objective:     Vitals:    12/20/24 1516   BP: 123/81   Pulse: 102   Temp: 98 °F (36.7 °C)     Lab Results   Component Value Date    WBC 2.76 (L) 12/20/2024    HGB 9.4 (L) 12/20/2024    HCT 29.1 (L) 12/20/2024    MCV 90 12/20/2024     12/20/2024       BMP  Lab Results   Component Value Date     12/20/2024    K 4.2 12/20/2024     12/20/2024    CO2 25 12/20/2024    BUN 55 (H) 12/20/2024    CREATININE 4.4 (H) 12/20/2024    CALCIUM 9.7 12/20/2024    ANIONGAP 13 12/20/2024    ESTGFRAFRICA 19 (A) 07/14/2022    EGFRNONAA 17 (A) 07/14/2022     Lab Results   Component Value Date    ALT 26 12/20/2024    AST 44 (H) 12/20/2024    ALKPHOS 103 12/20/2024    BILITOT 0.4 12/20/2024     Lab Results   Component  Value Date    IRON 80 10/22/2024    TIBC 337 10/22/2024    FERRITIN 81 10/22/2024       Physical Exam  Vitals reviewed.   Constitutional:       Appearance: She is well-developed.   HENT:      Head: Normocephalic.      Right Ear: External ear normal.      Left Ear: External ear normal.   Eyes:      General: Lids are normal. No scleral icterus.        Right eye: No discharge.         Left eye: No discharge.      Conjunctiva/sclera: Conjunctivae normal.   Neck:      Thyroid: No thyroid mass.   Cardiovascular:      Rate and Rhythm: Normal rate and regular rhythm.      Heart sounds: Normal heart sounds.   Pulmonary:      Effort: Pulmonary effort is normal. No respiratory distress.   Abdominal:      General: Bowel sounds are normal. There is no distension.      Palpations: Abdomen is soft.   Genitourinary:     Comments: deferred  Musculoskeletal:         General: Normal range of motion.      Cervical back: Normal range of motion.      Comments: Mild BLE edema   Skin:     General: Skin is warm and dry.   Neurological:      Mental Status: She is alert and oriented to person, place, and time.   Psychiatric:         Speech: Speech normal.         Behavior: Behavior normal. Behavior is cooperative.         Thought Content: Thought content normal.        Assessment:     Problem List Items Addressed This Visit          Oncology    Anemia associated with stage 5 chronic renal failure (Chronic)     Hg 9.4. Iron levels pending    Proceed with Retacrit today. F/u 1 month with repeat labs for consideration of Retacrit if hg <10         Ductal carcinoma in situ (DCIS) of left breast     Continue f/u with breast cancer survivorship          Other Visit Diagnoses       Anemia associated with stage 4 chronic renal failure    -  Primary    Relevant Orders    CBC Auto Differential    CBC Auto Differential              Plan:     Anemia associated with stage 4 chronic renal failure  -     CBC Auto Differential; Future; Expected date:  12/20/2024  -     CBC Auto Differential; Future; Expected date: 12/20/2024    Ductal carcinoma in situ (DCIS) of left breast    Anemia associated with stage 5 chronic renal failure      Route Chart for Scheduling    Med Onc Chart Routing      Follow up with physician    Follow up with LIA 4 weeks.   Infusion scheduling note    Injection scheduling note retacrit   Labs CBC   Scheduling:  Preferred lab:  Lab interval:     Imaging None      Pharmacy appointment No pharmacy appointment needed      Other referrals       No additional referrals needed           Therapy Plan Information  INF EPOETIN TRISTAN (RETACRIT) INITIAL DOSES for Other osteoporosis without current pathological fracture, noted on 8/30/2019  INF EPOETIN TRISTAN (RETACRIT) INITIAL DOSES for Anemia associated with stage 5 chronic renal failure, noted on 11/19/2024  epoetin tristan-epbx injection 20,000 Units  20,000 Units, Subcutaneous, PRN      No therapy plan of the specified type found.    No therapy plan of the specified type found.          JOSE J العراقيC

## 2024-12-20 NOTE — DISCHARGE INSTRUCTIONS
[facility]  [address] [address]  [address]  [phone] phone     [phone] fax  Hours of Operation: Monday- Friday 8:00am- 5:00pm  After hours phone  [phone]  Hematology / Oncology Physicians on call      SALENA Thurman Dr., NP Phaon Dunbar, NP Khelsea Conley, FNP    Please call with any concerns regarding your appointment today.

## 2024-12-31 NOTE — PROGRESS NOTES
Interventional Pain Progress Note     Referring Physician: No ref. provider found    PCP: Elis Wick MD    Chief Complaint: Low Back Pain    Interval History (1/8/2025):  Patient Nadia Damon presents today for follow-up visit.  Patient was last seen on 12/5/2024 Right L4/5 + L5/S1 RFA 50% relief. She is doing better ambulating without her walker and cane. Pain not going into the legs currently. She rates her pain today 7/10. She continues to have left sided pain and inquires about doing the RFA on the left. Pain is worse with prolonged standing and does not radiate. Pain is primarily axial.  She has finished rehab/PT following her stroke.  Patient denies night fever/night sweats, urinary incontinence, bowel incontinence, significant weight loss and significant motor weakness.   Patient denies any other complaints or concerns at this time.    Interval History (11/20/2024):  Patient Nadia Damon presents today for follow-up visit.  Patient was last seen on9/16/2024 T11/12 IL HELLEN with 85% relief. She had a stroke in July and saw neurosurgery following the stroke and was not interested in any surgical intervention. Overall she went through rehab and is improving in mobility and function. She continues to have axial low back pain with the right side worse than the left. No radiculopathy. Pain worse with standing and prolonged walking. Relief with leaning forward. She rates her pain today 7/10.  Patient denies night fever/night sweats, urinary incontinence, bowel incontinence, significant weight loss and significant motor weakness.   Patient denies any other complaints or concerns at this time.      Interval History (3/3/2023):  Patient returns to clinic after procedure.  Patient reports 100% relief and thoracic pain after T11-T12 interlaminar epidural steroid injection on 02/02/2023.  Patient reports occasional throbbing pain across her right lower back with no radiation to her buttocks or lower  extremities.  Pain is worse with lifting and prolonged standing, better with heat and topicals.  Pain is rated as a year out 10 currently. Denies any fevers, chills, changes in gait, weakness, or bowel and bladder incontinence        Interval History (1/13/22):  Patient returns to clinic for evaluation of thoracic pain.  Patient reports approximately 2 weeks ago she had sudden onset of lower thoracic pain.  She denies any significant inciting factors to his pain.  Pain starts as a sharp stabbing pain in her lower midline thoracic area and then radiates to her right side to her anterior chest wall.  Pain is worse with standing and walking, better with lying supine.  Pain is rated an 8/10.  Patient reports that this pain is significantly different from her previous lumbar pain. Denies any fevers, chills,  or bowel and bladder incontinence        Interval History (9/30/22):  Patient returns to clinic after procedure.  Patient reports complete resolution of right lower extremity pain after right sacroiliac joint injection and right L5-S1 facet injection on 08/22/2022.  Primary pain today is tight aching pain in right side of lower back.  Pain is worse with extension and rotation, better with rest and heat.  Patient does report an episode of physical therapy on 09/21/2022 where she experienced increased muscle aches and weakness.  She reports that since this time her symptoms have greatly improved.  She denies any significant weakness today.  Pain is rated a 7/10. Denies any fevers, chills, changes in gait, weakness, or bowel and bladder incontinence        SUBJECTIVE:    Nadia Damon is a 69 y.o. female who presents to the clinic for the evaluation of lower back pain. The pain started 1 year ago and symptoms have been worsening.The pain is located in the right lower back and right buttocks area with radiation to the posterior lateral aspect of her right thigh and occasionally her right calf.  The pain is described  as aching, shooting and stabbing and is rated as 5/10.   The pain is rated with a score of  2/10 on the BEST day and a score of 9/10 on the WORST day.  Symptoms interfere with daily activity and work. The pain is exacerbated by Sitting, Standing, Extension and Flexing.  The pain is mitigated by physical therapy and rest.     Patient denies night fever/night sweats, urinary incontinence, bowel incontinence, significant weight loss, significant motor weakness and loss of sensations.      Non-Pharmacologic Treatments:  Physical Therapy/Home Exercise: yes  Ice/Heat:yes  TENS: no  Acupuncture: no  Massage: no  Chiropractic: no        Previous Pain Medications:  Tylenol, topicals, neuropathic,      report:  Reviewed and consistent with medication use as prescribed.    Pain Procedures:   2023:  T11-12 interlaminar epidural steroid injection, 100% relief  22:  Right L4-5 and L5-S1 radiofrequency ablation, 75% relief  22:  Right sacroiliac joint and right L5-S1 facet injection,   Resolution of right lower extremity pain  2024 T11/12 IL HELLEN with 85% relief    Imagin2024 CT lumbar  FINDINGS:  No vertebral body compression deformity.  Normal bone mineral density.  Normal alignment of the vertebral bodies.  No soft tissue abnormality.  Mild moderate multilevel degenerative disc disease.  Atherosclerotic changes.     Impression:     No acute fracture or dislocation.  Mild moderate multilevel degenerative disc disease.  See recent myelogram for additional insights.    CT THORACIC SPINE WITHOUT CONTRAST 23     CLINICAL HISTORY:  Myelopathy, acute, thoracic spine;  Radiculopathy, thoracic region     TECHNIQUE:  Helical axial images are acquired through the thoracic spine without IV contrast.  Images were reformatted in the coronal sagittal plane.     COMPARISON:  None     FINDINGS:  Paraspinal soft tissues appear within normal limits.  Partially visualized lung fields demonstrate band like  opacity at the lingula.  Favor scarring/atelectasis.  Partially visualized abdominal contents are within normal limits.     Alignment is within normal limits.  There is no anterolisthesis or retrolisthesis.  Vertebral body heights are relatively well preserved.  There appears to be a chronic fracture deformity along the posterior spinous process of C7.  Borders of the bone fragment are well corticated.  No evidence of acute injury.  Partially visualized ribs appear intact.     Evaluation for central canal narrowing is limited without intrathecal contrast.     Multilevel degenerative disc changes are present, worst at the mid to lower thoracic spine.  Degenerative disc changes are worst at T11-T12.  There is a 5 mm disc bulge/herniation at T11-T12.  Disc bulge/herniation is slightly high density with minimal peripheral calcification.     No bony neural foraminal narrowing from C7 to T11.  There is moderate to severe right and mild-to-moderate left neural foraminal narrowing.     Impression:     1. Degenerative disc changes, worst at T11-T12.  Evaluation for central canal narrowing is limited without intrathecal contrast.  2. Bony neural foraminal narrowing is worst at the right T11-T12 foramen.         Results for orders placed during the hospital encounter of 07/13/22    X-Ray Lumbar Spine 5 View    Narrative  EXAMINATION:  XR LUMBAR SPINE COMPLETE 5 VIEW    CLINICAL HISTORY:  Sacrococcygeal disorders, not elsewhere classified    TECHNIQUE:  AP, lateral, spot and bilateral oblique views of the lumbar spine were performed.    COMPARISON:  Lumbar spine radiographs May 17, 2018    FINDINGS:  Minimal grade 1 anterolisthesis of L4 on L5.  No fracture or pars defect.  Unchanged mild disc height loss at the L4-L5 level.  Moderate to severe degenerative disc disease at the T11-T12 level.  Prominent inferior lumbar spine facet arthropathy.  Sacroiliac joints appear normal.  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  No osseous erosion or suspicious osseous lesion.    Impression  As above.      Electronically signed by: Isac Bell  Date:    07/13/2022  Time:    15:39          Past Medical History:   Diagnosis Date    Abdominal wall hernia     CT Renal 6/11/2018---Small fat containing superior ventral abdominal wall hernia at the epicardial pacing lead site.    Abnormal mammogram 10/12/2021    Acute cystitis without hematuria 05/10/2022    Acute ischemic right middle cerebral artery (MCA) stroke 07/20/2024    Adverse drug reaction 11/12/2024    GRACE (acute kidney injury) 11/22/2021    Anxiety     Aphasia 07/19/2024    Arthritis     ZEN HIPS    Breast cancer in female 08/2021    LEFT BREAST    Breast mass, right 11/13/2024    RIGHT BREAST MASS (Problem)      Bronchitis 08/18/2016    Never smoked      C. difficile colitis 11/29/2021    Cellulitis of axilla, left 12/23/2021    Chronic diastolic heart failure 12/16/2021    Chronic kidney disease     stage 4, GFR 15-29 ml/min    Chronic midline low back pain without sciatica 06/18/2018    CKD (chronic kidney disease) stage 4, GFR 15-29 ml/min 04/20/2016    US Retro   5/16/2018---Mild cortical thinning and increased cortical echogenicity.  Findings can be seen with medical renal disease.  8/31/216--- Echogenic kidneys with diffuse cortical thinning suggesting  medical renal disease. 2 complex right renal cortical cysts.      Closed nondisplaced fracture of distal phalanx of left great toe with routine healing 10/22/2018    Coronary artery disease 1993    heart transplant    COVID-19 in immunocompromised patient 02/26/2024    Cystitis 05/10/2022    Depression     Encounter for blood transfusion     Encounter for palliative care 10/14/2024    Fibromyalgia     on Lyrica    Heart failure     native heart cardiomyopathy    Heart transplanted 1993    due to cardiomyopathy    History of hyperparathyroidism; Hyperparathyroidism, secondary  renal     PT DENIES    Hypertension     Immune disorder     anti rejection meds    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    Impaired mobility 07/28/2022    Iron deficiency anemia 08/15/2017    Kidney stones     passed per pt    Obesity     Obesity (BMI 30.0-34.9) 07/22/2019    Other osteoporosis without current pathological fracture 08/30/2019    Other pancytopenia 05/06/2024    Parotitis, acute 09/19/2023    Pharyngitis 01/09/2019    Pneumonia due to infectious organism 03/14/2024    PONV (postoperative nausea and vomiting)     Severe sepsis 11/22/2021    Shingles 2003 approx    left leg    Stage 4 chronic kidney disease 11/22/2021    Subclinical hypothyroidism 06/16/2023    Thrombocytopenia, unspecified 11/29/2021    Trouble in sleeping     Unresponsiveness 11/13/2024    Urinary incontinence      Past Surgical History:   Procedure Laterality Date    bladder implant Right 06/11/2024    BLADDER SURGERY  2015 approx    mesh - Dr Everett then 2nd reconstructive sx Dr Onofre    BREAST BIOPSY Bilateral     NEGATIVE    BREAST BIOPSY Right 10/31/2022    benign    BREAST LUMPECTOMY Left 2021    BREAST SURGERY Left 09/28/2015    Bx - benign    BREAST SURGERY Right 12/2015    Bx benign    CARDIAC PACEMAKER REMOVAL Left 06/26/2014    Pacer defirillator removed. Put in 1993 aat time of heart transplant    CARPAL TUNNEL RELEASE Left 03/03/2015    Dr. Hall    COLONOSCOPY N/A 02/25/2021    Procedure: COLONOSCOPY;  Surgeon: Freida Ramirez MD;  Location: [facility];  Service: Endoscopy;  Laterality: N/A;    CYSTOCELE REPAIR      Twice with mesh removal    EPIDURAL STEROID INJECTION INTO CERVICAL SPINE N/A 02/02/2023    Procedure: T11/T12 IL HELLEN;  Surgeon: Jassi Pierre MD;  Location: [facility];  Service: Pain Management;  Laterality: N/A;    EPIDURAL STEROID INJECTION INTO CERVICAL SPINE N/A 9/16/2024    Procedure: T11/T12 IL HELLEN;  Surgeon: Jassi Pierre MD;  Location: [facility];  Service:  Pain Management;  Laterality: N/A;    HEART TRANSPLANT  1993    HERNIA REPAIR Right 1971 approx    Inguinal    HYSTERECTOMY  1983    vag hyst /LSO     IMPLANTATION OF PERMANENT SACRAL NERVE STIMULATOR N/A 06/11/2024    Procedure: INSERTION, NEUROSTIMULATOR, PERMANENT, SACRAL;  Surgeon: Sami Cochran MD;  Location: [facility] OR;  Service: Urology;  Laterality: N/A;    INCISION AND DRAINAGE OF ABSCESS Left 12/24/2021    Procedure: INCISION AND DRAINAGE, ABSCESS;  Surgeon: Joseph Longo MD;  Location: [facility] OR;  Service: General;  Laterality: Left;    INJECTION OF ANESTHETIC AGENT AROUND MEDIAL BRANCH NERVES INNERVATING LUMBAR FACET JOINT Right 10/19/2022    Procedure: Right L4/L5 and L5/S1 MBB;  Surgeon: Jassi Pierre MD;  Location: [facility];  Service: Pain Management;  Laterality: Right;    INJECTION OF ANESTHETIC AGENT AROUND MEDIAL BRANCH NERVES INNERVATING LUMBAR FACET JOINT Right 11/09/2022    Procedure: Right L4/L5 and L5/S1 MBB;  Surgeon: Jassi Pierre MD;  Location: [facility];  Service: Pain Management;  Laterality: Right;    INJECTION OF ANESTHETIC AGENT INTO SACROILIAC JOINT Right 08/22/2022    Procedure: Right SIJ Injection Right L5/S1 Facte Injection;  Surgeon: Jassi Pierre MD;  Location: [facility];  Service: Pain Management;  Laterality: Right;    INSERTION OF TUNNELED CENTRAL VENOUS CATHETER (CVC) WITH SUBCUTANEOUS PORT N/A 11/09/2021    Procedure: [id number];  Surgeon: Christoph Douglas MD;  Location: [facility] OR;  Service: General;  Laterality: N/A;    RADIOFREQUENCY ABLATION Right 12/5/2024    Procedure: Right L4-5 and L5-S1 RFA;  Surgeon: Jassi Pierre MD;  Location: [facility];  Service: Pain Management;  Laterality: Right;    RADIOFREQUENCY THERMOCOAGULATION Right 12/07/2022    Procedure: Right L4/L5 and L5/S1 Lumbar RFA;  Surgeon: Jassi Pierre MD;  Location: [facility];  Service: Pain Management;  Laterality: Right;    REMOVAL OF VASCULAR ACCESS  PORT      ROBOT-ASSISTED LAPAROSCOPIC ABDOMINAL SACROCOLPOPEXY N/A 08/10/2023    Procedure: ROBOTIC SACROCOLPOPEXY, ABDOMEN;  Surgeon: PRANAY Villalobos MD;  Location: [facility] OR;  Service: OB/GYN;  Laterality: N/A;    ROBOT-ASSISTED LAPAROSCOPIC OOPHORECTOMY Right 08/10/2023    Procedure: ROBOTIC OOPHORECTOMY;  Surgeon: PRANAY Villalobos MD;  Location: [facility] OR;  Service: OB/GYN;  Laterality: Right;    SENTINEL LYMPH NODE BIOPSY Left 10/12/2021    Procedure: BIOPSY, LYMPH NODE, SENTINEL;  Surgeon: Christoph Douglas MD;  Location: [facility] OR;  Service: General;  Laterality: Left;    TOE SURGERY      XI ROBOTIC URETHROPEXY N/A 08/10/2023    Procedure: XI ROBOTIC URETHROPEXY;  Surgeon: PRANAY Villalobos MD;  Location: [facility] OR;  Service: OB/GYN;  Laterality: N/A;     Social History     Socioeconomic History    Marital status: Single    Number of children: 2    Highest education level: 11th grade   Occupational History    Occupation: Retired   Tobacco Use    Smoking status: Never     Passive exposure: Never    Smokeless tobacco: Never   Substance and Sexual Activity    Alcohol use: Never     Alcohol/week: 0.0 standard drinks of alcohol    Drug use: No    Sexual activity: Not Currently     Partners: Male     Birth control/protection: See Surgical Hx   Other Topics Concern    Are you pregnant or think you may be? No    Breast-feeding No   Social History Narrative    Single. 2 children , 1  at 30 yoa  2014 strep throat -  pneumonia and renal complications after not completing course of AB. Other child lives in [place], [place]. Has a cousin locally that could help in an emergency. 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 Caffeine intake =- 1 cola a day. No coffee, + occasional tea, avoids caffeine especially at night. Still drives. She does not have a Living Will or Advanced directive.      Social Drivers of Health     Financial Resource Strain: Low Risk  (2025)    Overall Financial  Resource Strain (CARDIA)     Difficulty of Paying Living Expenses: Not hard at all   Food Insecurity: No Food Insecurity (1/7/2025)    Hunger Vital Sign     Worried About Running Out of Food in the Last Year: Never true     Ran Out of Food in the Last Year: Never true   Recent Concern: Food Insecurity - Food Insecurity Present (11/13/2024)    Hunger Vital Sign     Worried About Running Out of Food in the Last Year: Never true     Ran Out of Food in the Last Year: Sometimes true   Transportation Needs: No Transportation Needs (1/7/2025)    PRAPARE - Transportation     Lack of Transportation (Medical): No     Lack of Transportation (Non-Medical): No   Physical Activity: Inactive (1/7/2025)    Exercise Vital Sign     Days of Exercise per Week: 0 days     Minutes of Exercise per Session: 0 min   Stress: Stress Concern Present (1/7/2025)    [employer] - Occupational Stress Questionnaire     Feeling of Stress : To some extent   Housing Stability: Low Risk  (1/7/2025)    Housing Stability Vital Sign     Unable to Pay for Housing in the Last Year: No     Homeless in the Last Year: No     Family History   Problem Relation Name Age of Onset    Cancer Mother  38        breast    Breast cancer Mother      Breast cancer Maternal Grandmother      Heart disease Maternal Grandmother      Hypertension Son      Cataracts Cousin      Diabetes Neg Hx      Stroke Neg Hx      Kidney disease Neg Hx      Asthma Neg Hx      COPD Neg Hx      Melanoma Neg Hx      Hyperlipidemia Neg Hx         Review of patient's allergies indicates:   Allergen Reactions    Dobutamine Other (See Comments)     Severe Left sided chest pain after dosage administered for Dobutamine Stress Test; itching    Lisinopril Swelling and Rash    Nitro Shortness Of Breath     Audible wheezing - after Nitrolgycerin Spray administered x 2; itching    Hydrocodone-acetaminophen Nausea Only    Augmentin [amoxicillin-pot clavulanate] Diarrhea     Zyvox [linezolid] Nausea And Vomiting       Current Outpatient Medications   Medication Sig    aspirin (ECOTRIN) 81 MG EC tablet Take 1 tablet (81 mg total) by mouth once daily.    calcitRIOL (ROCALTROL) 0.25 MCG Cap Take 1 capsule (0.25 mcg total) by mouth once daily.    carvediloL (COREG) 3.125 MG tablet Take 1 tablet (3.125 mg total) by mouth 2 (two) times daily with meals. Hold parameters: SBP less than 110 and/or DBP less than 75    furosemide (LASIX) 20 MG tablet Take 2 tablets (40 mg total) by mouth once daily. HOLD UNTIL FOLLOW UP WITH PCP    NIFEdipine (PROCARDIA-XL) 30 MG (OSM) 24 hr tablet Take 30 mg by mouth once daily.    nitroGLYCERIN (NITROSTAT) 0.4 MG SL tablet PLACE 1 TABLET ABLET UNDER THE TONGUE 5 MINS. AS NEEDED FOR CHEST PAIN FOR UP TO 3 DOSES.IF CONTINUED HEART PAIN/PRESSURE AFTER    ondansetron (ZOFRAN) 4 MG tablet Take 4 mg by mouth as needed for Nausea.    pantoprazole (PROTONIX) 20 MG tablet TAKE 1 TABLET ONE TIME DAILY    polyethylene glycol (GLYCOLAX) 17 gram PwPk Take 17 g by mouth once daily.    pregabalin (LYRICA) 25 MG capsule Take 1 capsule (25 mg total) by mouth every evening.    sodium bicarbonate 650 MG tablet Take 1 tablet (650 mg total) by mouth 2 (two) times daily.    temazepam (RESTORIL) 30 mg capsule Take 1 capsule (30 mg total) by mouth nightly as needed for Insomnia. FOR INSOMNIA    tiZANidine (ZANAFLEX) 2 MG tablet Take 1 tablet (2 mg total) by mouth every evening.    vitamin E 400 UNIT capsule Take 400 Units by mouth once daily.    zolpidem (AMBIEN) 5 MG Tab Take 1 tablet (5 mg total) by mouth every evening.    cycloSPORINE modified, NEORAL, (NEORAL) 25 MG capsule Take 3 capsules (75 mg total) by mouth 2 (two) times daily.    methocarbamoL (ROBAXIN) 500 MG Tab Take 1 tablet (500 mg total) by mouth 2 (two) times daily as needed.     No current facility-administered medications for this visit.         ROS  Review of Systems   Constitutional:  Negative for chills,  "diaphoresis, fatigue and fever.   Respiratory:  Negative for chest tightness, shortness of breath, wheezing and stridor.    Cardiovascular:  Positive for leg swelling. Negative for chest pain.   Gastrointestinal:  Negative for blood in stool, diarrhea, nausea and vomiting.   Endocrine: Negative for cold intolerance and heat intolerance.   Genitourinary:  Negative for dysuria, hematuria and urgency.   Musculoskeletal:  Positive for arthralgias and back pain. Negative for gait problem, joint swelling, myalgias, neck pain and neck stiffness.   Skin:  Negative for rash.   Neurological:  Negative for tremors, seizures, speech difficulty, weakness, light-headedness, numbness and headaches.   Hematological:  Does not bruise/bleed easily.   Psychiatric/Behavioral:  Negative for agitation, confusion and suicidal ideas. The patient is not nervous/anxious.             OBJECTIVE:  BP (!) 184/105 (BP Location: Right arm, Patient Position: Sitting)   Resp 17   Ht 5' 2" (1.575 m)   Wt 68.3 kg (150 lb 11 oz)   LMP 06/20/1983 (Within Years)   BMI 27.56 kg/m²         Physical Exam  Constitutional:       General: She is not in acute distress.     Appearance: Normal appearance. She is not ill-appearing.   HENT:      Head: Normocephalic and atraumatic.      Nose: No congestion or rhinorrhea.      Mouth/Throat:      Mouth: Mucous membranes are moist.   Eyes:      Extraocular Movements: Extraocular movements intact.      Pupils: Pupils are equal, round, and reactive to light.   Cardiovascular:      Pulses: Normal pulses.   Pulmonary:      Effort: Pulmonary effort is normal.   Abdominal:      General: Abdomen is flat.      Palpations: Abdomen is soft.   Musculoskeletal:      Cervical back: Normal range of motion and neck supple.   Skin:     General: Skin is warm and dry.      Capillary Refill: Capillary refill takes less than 2 seconds.   Neurological:      General: No focal deficit present.      Mental Status: She is alert and " oriented to person, place, and time.      Cranial Nerves: No cranial nerve deficit.      Sensory: No sensory deficit.      Motor: No weakness or abnormal muscle tone.      Gait: Gait abnormal (slight right foot drop).      Deep Tendon Reflexes: Babinski sign absent on the right side. Babinski sign absent on the left side.      Reflex Scores:       Patellar reflexes are 2+ on the right side and 2+ on the left side.       Achilles reflexes are 2+ on the right side and 2+ on the left side.  Psychiatric:         Mood and Affect: Mood normal.         Behavior: Behavior normal.         Thought Content: Thought content normal.           Musculoskeletal:      Lumbar Exam  Incision: no  Pain with Flexion: yes  Pain with Extension: yes > than flexion  ROM:  limited due to pain  Paraspinous TTP:  bilateral lumbar paraspinous  Facet TTP:  L5-S1  Facet Loading:  Positive bilaterally  SLR:  Negative bilaterally  SIJ TTP:  positive bilaterally  ALBERTO:  positive bilaterally with compression and distraction      LABS:  Lab Results   Component Value Date    WBC 2.76 (L) 12/20/2024    HGB 9.4 (L) 12/20/2024    HCT 29.1 (L) 12/20/2024    MCV 90 12/20/2024     12/20/2024       CMP  Sodium   Date Value Ref Range Status   12/20/2024 144 136 - 145 mmol/L Final     Potassium   Date Value Ref Range Status   12/20/2024 4.2 3.5 - 5.1 mmol/L Final     Chloride   Date Value Ref Range Status   12/20/2024 106 95 - 110 mmol/L Final     CO2   Date Value Ref Range Status   12/20/2024 25 23 - 29 mmol/L Final     Glucose   Date Value Ref Range Status   12/20/2024 96 70 - 110 mg/dL Final     BUN   Date Value Ref Range Status   12/20/2024 55 (H) 8 - 23 mg/dL Final     Creatinine   Date Value Ref Range Status   12/20/2024 4.4 (H) 0.5 - 1.4 mg/dL Final     Calcium   Date Value Ref Range Status   12/20/2024 9.7 8.7 - 10.5 mg/dL Final     Total Protein   Date Value Ref Range Status   12/20/2024 7.6 6.0 - 8.4 g/dL Final     Albumin   Date Value Ref  Range Status   12/20/2024 3.4 (L) 3.5 - 5.2 g/dL Final     Total Bilirubin   Date Value Ref Range Status   12/20/2024 0.4 0.1 - 1.0 mg/dL Final     Comment:     For infants and newborns, interpretation of results should be based  on gestational age, weight and in agreement with clinical  observations.    Premature Infant recommended reference ranges:  Up to 24 hours.............<8.0 mg/dL  Up to 48 hours............<12.0 mg/dL  3-5 days..................<15.0 mg/dL  6-29 days.................<15.0 mg/dL       Alkaline Phosphatase   Date Value Ref Range Status   12/20/2024 103 40 - 150 U/L Final     AST   Date Value Ref Range Status   12/20/2024 44 (H) 10 - 40 U/L Final     ALT   Date Value Ref Range Status   12/20/2024 26 10 - 44 U/L Final     Anion Gap   Date Value Ref Range Status   12/20/2024 13 8 - 16 mmol/L Final     eGFR if    Date Value Ref Range Status   07/14/2022 19 (A) >60 mL/min/1.73 m^2 Final     eGFR if non    Date Value Ref Range Status   07/14/2022 17 (A) >60 mL/min/1.73 m^2 Final     Comment:     Calculation used to obtain the estimated glomerular filtration  rate (eGFR) is the CKD-EPI equation.          Lab Results   Component Value Date    HGBA1C 5.1 07/02/2024             ASSESSMENT:       69 y.o. year old female with lower back pain, consistent with     1. Lumbar spondylosis  Case Request-RAD/Other Procedure Area: Left L4-5 and L5-S1 MBB #1 with local      2. Chronic pain disorder        3. Degeneration of intervertebral disc of lumbar region with discogenic back pain                Lumbar spondylosis  -     Case Request-RAD/Other Procedure Area: Left L4-5 and L5-S1 MBB #1 with local    Chronic pain disorder    Degeneration of intervertebral disc of lumbar region with discogenic back pain    Other orders  -     methocarbamoL (ROBAXIN) 500 MG Tab; Take 1 tablet (500 mg total) by mouth 2 (two) times daily as needed.  Dispense: 60 tablet; Refill: 2                    PLAN:   - Interventions: Schedule left L3-5 MBB #1. We will call the day after the procedure. If patient reports at least 80% relief of pain, we will move forward with MBB #2. If patient again reports at least 80% relief of pain, we will then move forward with the RFA.   Explained the risks and benefits of the procedure in detail with the patient today in clinic along with alternative treatment options, and the patient elected to pursue the intervention.        02/02/2023:  T11-12 interlaminar epidural steroid injection, 100% relief  -12/7/22:  Right L4-5 and L5-S1 radiofrequency ablation, 75% relief  -8/22/22:  Right sacroiliac joint and right L5-S1 facet injection,   Resolution of right lower extremity pain  - Anticoagulation use:   no no anticoagulation    - Medications:   Continue Tylenol, and Lyrica as prescribed by primary care  Taken off cymbalta by PCP  Tizanidine ineffective. Stop tizanidine. Start robaxin 500mg BID prn. We have discussed potential deleterious side effects associated with this medication including  dizziness, drowsiness, stomach upset, nausea vomiting or blurred vision.  Patient expresses understanding.   Per Uptodate dosing does not need to be adjusted for renal impairment    - Therapy:    Continue formal physical therapy    - Imaging:   CT of thoracic spine reviewed.  Significant for right eccentric disc bulge at T11-12 with foraminal stenosis   X-ray Lumbar  reviewed. Significant for grade 1 anterolisthesis of L4 upon L5.  Degenerative disc disease at T11-T12.  As well as pseudo joint formation of right L5 transverse process with superior sacrum     - Consults:   Continue follow-up with cardiology for previous heart transplant    - Counseled patient regarding the importance of activity modification and physical therapy    - Patient Questions: Answered all of the patient's questions regarding diagnosis, therapy, and treatment    - Follow up visit:  call day after procedure  Return  to clinic in 3 months  BP elevated today.; states has been normal at home and with PCP yesterday she is just in pain. Denies CP or HA. She will recheck BP at home shortly, if still >160/100 will go to urgent care    The above plan and management options were discussed at length with patient. Patient is in agreement with the above and verbalized understanding.    I discussed the goals of interventional chronic pain management with the patient on today's visit.  I explained the utility of injections for diagnostic and therapeutic purposes.  We discussed a multimodal approach to pain including treating the patient's given worst pain at any given time.  We will use a systematic approach to addressing pain.  We will also adopt a multimodal approach that includes injections, adjuvant medications, physical therapy, at times psychiatry.  There may be a limited role for opioid use intermittently in the treatment of pain, more particularly for acute pain although no one approach can be used as a sole treatment modality.    I emphasized the importance of regular exercise, core strengthening and stretching, diet and weight loss as a cornerstone of long-term pain management.      Corin Lincoln NP  Interventional Pain Management  [facility]    Disclaimer:  This note was prepared using voice recognition system and is likely to have sound alike errors that may have been overlooked even after proof reading.  Please call me with any questions

## 2025-01-02 RX ORDER — SODIUM BICARBONATE 650 MG/1
650 TABLET ORAL 2 TIMES DAILY
Qty: 60 TABLET | Refills: 1 | Status: SHIPPED | OUTPATIENT
Start: 2025-01-02

## 2025-01-06 NOTE — PROGRESS NOTES
"  Nadia Damon presented for a follow-up Medicare AWV today. The following components were reviewed and updated:    Medical history  Family History  Social history  Allergies and Current Medications  Health Risk Assessment  Health Maintenance  Care Team    **See Completed Assessments for Annual Wellness visit with in the encounter summary    The following assessments were completed:  Depression Screening  Cognitive function Screening  Timed Get Up Test  Whisper Test  Nutrition Screening      Time asked: 10 minutes after 11 o'clock (11:10)    Opioid documentation for eAWV      Patient does not have a current opioid prescription.         Vitals:    01/07/25 0841   BP: 124/60   BP Location: Left arm   Patient Position: Sitting   Pulse: 89   SpO2: 97%   Weight: 68.2 kg (150 lb 3.9 oz)   Height: 5' 2" (1.575 m)     Body mass index is 27.48 kg/m².         Physical Exam  Vitals reviewed.   Constitutional:       Appearance: Normal appearance. She is normal weight.   HENT:      Head: Normocephalic.      Right Ear: Tympanic membrane, ear canal and external ear normal.      Left Ear: Tympanic membrane, ear canal and external ear normal.      Nose: Nose normal.      Mouth/Throat:      Mouth: Mucous membranes are moist.      Pharynx: Oropharynx is clear.   Eyes:      Extraocular Movements: Extraocular movements intact.      Conjunctiva/sclera: Conjunctivae normal.      Pupils: Pupils are equal, round, and reactive to light.   Cardiovascular:      Rate and Rhythm: Normal rate. Rhythm regularly irregular.      Pulses: Normal pulses.      Heart sounds: Normal heart sounds.   Pulmonary:      Effort: Pulmonary effort is normal.      Breath sounds: Normal breath sounds.   Abdominal:      General: Bowel sounds are normal.      Palpations: Abdomen is soft.   Musculoskeletal:         General: Normal range of motion.      Cervical back: Normal range of motion and neck supple.      Right lower leg: 3+ Pitting Edema present.      Left " lower le+ Pitting Edema present.   Skin:     General: Skin is warm and dry.      Capillary Refill: Capillary refill takes less than 2 seconds.   Neurological:      General: No focal deficit present.      Mental Status: She is alert and oriented to person, place, and time. Mental status is at baseline.   Psychiatric:         Mood and Affect: Mood normal.         Behavior: Behavior normal.         Thought Content: Thought content normal.         Judgment: Judgment normal.               Health Maintenance         Date Due Completion Date    RSV Vaccine (Age 60+ and Pregnant patients) (1 - Risk 60-74 years 1-dose series) Never done ---    COVID-19 Vaccine ( season) 2024    Mammogram 2025    Lipid Panel 2025    Colorectal Cancer Screening 2026    Hemoglobin A1c (Diabetic Prevention Screening) 2027    DEXA Scan 2027 7/3/2024    TETANUS VACCINE 2030              PROBLEM LIST ITEMS ADDRESSED THIS VISIT:    1. Encounter for preventive health examination  Assessment & Plan:  Up-To-Date w/ recommended age appropirate screening   Up-To-Date w/ recommended vaccines except Covid & RSV  Review for Opioid Screening: Pt does not have Rx for Opioids    Review for Substance Use Disorders: Patient does not use illicit substances as reported        2. Secondary and unspecified malignant neoplasm of axilla and upper limb lymph nodes  Overview:  TREATMENT HISTORY:   1. L lumpectomy 9/10/21  2. L sentinel node biopsy 10/12/21  3. Attempted chemotherapy  4. 42.56Gy/16fx to L breast and axilla completed 22    Assessment & Plan:  Assess and monitor   Continue to massage area  Recommend therapy to increase movement of left upper arm.       3. Heart transplanted  Overview:       Assessment & Plan:  External cardiology Dr. King  F/U w/ [facility] team  Continue to monitor       4. Hemiparesis affecting right side  as late effect of cerebrovascular accident (CVA)  Assessment & Plan:  Discuss fall precaution  Use cane while walking for support       5. Anemia associated with stage 5 chronic renal failure  Assessment & Plan:  Component      Latest Ref Rng 11/15/2024 12/12/2024 12/20/2024   RBC      4.00 - 5.40 M/uL 3.23 (L)  3.51 (L)  3.25 (L)    Hemoglobin      12.0 - 16.0 g/dL 9.3 (L)  10.0 (L)  9.4 (L)    Hematocrit      37.0 - 48.5 % 29.8 (L)  31.8 (L)  29.1 (L)      Component      Latest Ref Rng 11/15/2024 12/12/2024 12/20/2024   eGFR      >60 mL/min/1.73 m^2 11.8 !  11.8 !  10 !       Ferritin 20.0 - 300.0 ng/mL 76 81     Saturated Iron 20 - 50 % 23 24 32     Continue to monitor  Refuse dialysis  F/U w/ nephrology and hematology         6. Essential hypertension  Assessment & Plan:  Controlled this visit  Continue nifedipine, carvedilol, nitro SL  Continue daily activity, low sodium diet, weight loss efforts  BP Readings from Last 3 Encounters:   01/07/25 124/60   12/20/24 123/81   12/20/24 123/81           7. Chronic diastolic (congestive) heart failure  Overview:      Echocardiogram 5/28/24   The estimated pulmonary artery systolic pressure is 53 mmHg.   Left Ventricle: The left ventricle is normal in size. Normal wall thickness. There is mild asymmetric hypertrophy. Normal wall motion. Septal flattening in systole consistent with right ventricular pressure overload. There is normal systolic function with a visually estimated ejection fraction of 55 - 60%. There is diastolic dysfunction but grade cannot be determined.    Assessment & Plan:  Resume Lasix as previously prescribed.   F/U w/ Transplant MD & Cardiologist     Patient has Diastolic (HFpEF) heart failure that is Chronic. On presentation their CHF was well compensated. Most recent BNP and echo results are listed below.  BNP 0 - 99 pg/mL 764 High  491 High   High   High  CM     Current Heart Failure Medications   carvediloL tablet 3.125 mg, 2 times  daily with meals, Oral   Lasix 40 mg daily     Plan  - Monitor strict I&Os and daily weights.    - Cardiology has been consulted  - The patient's volume status is stable - BNP elevated but also renal function uptrending.    Need repeat BNP      8. Aortic atherosclerosis  Overview:  CXR  8/8/2016---Aortic atherosclerosis.    Assessment & Plan:  Monitor BP and cholesterol  Maintain a low fat diet, low sodium diet  Recommend exercise at least 30 minutes daily.        9. Immunocompromised state due to drug therapy  Assessment & Plan:  Discuss avoiding crowds, wear a mask in public   Advise proper hand hygiene   Recommend appropriate vaccinations  Continue to monitor while on cyclosporine      10. CKD (chronic kidney disease) stage 5, GFR less than 15 ml/min    11. Ventral hernia without obstruction or gangrene  Overview:  CT abd Jan 2023    Assessment & Plan:  No surgery on hernia recommended  Continue to monitor closely        12. Secondary hyperparathyroidism, renal  Assessment & Plan:  Lab Results   Component Value Date    .6 (H) 12/12/2024    CALCIUM 9.7 12/20/2024    CAION 1.13 09/05/2018    PHOS 4.3 12/12/2024    Chronic, stable      13. 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 Spinal stenosis of lumbosacral region  Overview:  - Imaging w/ severe degenerative disc disease with high-grade disc narrowing at T11 and T12, bulge at L4-5 with foraminal stenosis  - Outpatient follow-up with neurosurgery as recommended at [facility]      Assessment & Plan:  Continue pregabalin, tizandine and OTC tylenol as needed for pain.   Recommend increasing movement as tolerated                 Provided Nadia with a 5-10 year written screening schedule and personal prevention plan. Recommendations were developed using the USPSTF  age appropriate recommendations. Education, counseling, and referrals were provided as needed.  After Visit Summary printed and given to patient which includes a list of additional screenings\tests needed.        Future Appointments   Date Time Provider Department Center   1/8/2025 10:40 AM Corin Lincoln NP ON IN PN [facility]   1/9/2025  1:00 PM Luz Dickson NP HG PALLCAR [place]   1/10/2025  9:00 AM Génesis Gonzales LCSW BS [facility]   1/16/2025 11:00 AM LABORATORY, [facility]A ONLH LAB [facility]   1/17/2025 10:00 AM Sami Cochran MD [facility] UROLOGY [facility]   1/17/2025 10:30 AM Yudith Mendoza NP [facility] HEM ONC [facility]   1/17/2025 11:15 AM INJECTION 1, [facility] INFUSION [facility] INFSN [facility]   1/23/2025  2:30 PM Prasanth Johnson MD [facility] RHEU [facility]   1/31/2025 10:20 AM Elis Wick MD BS [facility]   2/24/2025 12:30 PM SANTIAGO BRANTLEY CC LAB [facility] LAB DS [facility]   3/3/2025  2:00 PM Carter Crawford MD [facility] NEPHRO [facility]   4/3/2025 10:00 AM ONLH MAMMO1 ONLH MAMMO [facility]   4/9/2025 10:40 AM Christoph Douglas MD HG GENSUR [facility]   4/14/2025 11:00 AM Kristine Delgado NP [facility] BRESUR [facility]              Mere Pineda, APRN, FNP-C  [facility] [place] [address]  [phone] ph  [phone] fax      I offered to discuss advanced care planning, including how to pick a person who would make decisions for you if you were unable to make them for yourself, called a health care power of , and what kind of decisions you might make such as use of life sustaining treatments such as ventilators and tube feeding when faced with a life limiting illness recorded on a living will that they will need to know. (How you want to be cared for as you near the end of your natural life)     X  Patient has advanced directives on file, which we reviewed, and they do not wish to make changes.

## 2025-01-07 ENCOUNTER — OFFICE VISIT (OUTPATIENT)
Dept: PRIMARY CARE CLINIC | Facility: CLINIC | Age: 70
End: 2025-01-07
Payer: MEDICARE

## 2025-01-07 ENCOUNTER — TELEPHONE (OUTPATIENT)
Dept: PRIMARY CARE CLINIC | Facility: CLINIC | Age: 70
End: 2025-01-07
Payer: MEDICARE

## 2025-01-07 VITALS
HEART RATE: 89 BPM | HEIGHT: 62 IN | WEIGHT: 150.25 LBS | DIASTOLIC BLOOD PRESSURE: 60 MMHG | BODY MASS INDEX: 27.65 KG/M2 | OXYGEN SATURATION: 97 % | SYSTOLIC BLOOD PRESSURE: 124 MMHG

## 2025-01-07 DIAGNOSIS — M48.07 SPINAL STENOSIS OF LUMBOSACRAL REGION: Chronic | ICD-10-CM

## 2025-01-07 DIAGNOSIS — Z79.899 IMMUNOCOMPROMISED STATE DUE TO DRUG THERAPY: ICD-10-CM

## 2025-01-07 DIAGNOSIS — D63.1 ANEMIA ASSOCIATED WITH STAGE 5 CHRONIC RENAL FAILURE: ICD-10-CM

## 2025-01-07 DIAGNOSIS — I25.758: ICD-10-CM

## 2025-01-07 DIAGNOSIS — N18.5 ANEMIA ASSOCIATED WITH STAGE 5 CHRONIC RENAL FAILURE: ICD-10-CM

## 2025-01-07 DIAGNOSIS — I10 ESSENTIAL HYPERTENSION: Chronic | ICD-10-CM

## 2025-01-07 DIAGNOSIS — N25.81 SECONDARY HYPERPARATHYROIDISM, RENAL: Chronic | ICD-10-CM

## 2025-01-07 DIAGNOSIS — I50.32 CHRONIC DIASTOLIC (CONGESTIVE) HEART FAILURE: Chronic | ICD-10-CM

## 2025-01-07 DIAGNOSIS — D84.821 IMMUNOCOMPROMISED STATE DUE TO DRUG THERAPY: ICD-10-CM

## 2025-01-07 DIAGNOSIS — C77.3 SECONDARY AND UNSPECIFIED MALIGNANT NEOPLASM OF AXILLA AND UPPER LIMB LYMPH NODES: ICD-10-CM

## 2025-01-07 DIAGNOSIS — I70.0 AORTIC ATHEROSCLEROSIS: Chronic | ICD-10-CM

## 2025-01-07 DIAGNOSIS — K43.9 VENTRAL HERNIA WITHOUT OBSTRUCTION OR GANGRENE: Chronic | ICD-10-CM

## 2025-01-07 DIAGNOSIS — Z94.1 HEART TRANSPLANTED: ICD-10-CM

## 2025-01-07 DIAGNOSIS — I69.351 HEMIPARESIS AFFECTING RIGHT SIDE AS LATE EFFECT OF CEREBROVASCULAR ACCIDENT (CVA): ICD-10-CM

## 2025-01-07 DIAGNOSIS — N18.5 CKD (CHRONIC KIDNEY DISEASE) STAGE 5, GFR LESS THAN 15 ML/MIN: Chronic | ICD-10-CM

## 2025-01-07 DIAGNOSIS — Z00.00 ENCOUNTER FOR PREVENTIVE HEALTH EXAMINATION: Primary | ICD-10-CM

## 2025-01-07 PROBLEM — R19.7 DIARRHEA: Status: RESOLVED | Noted: 2024-11-19 | Resolved: 2025-01-07

## 2025-01-07 PROCEDURE — 99999 PR PBB SHADOW E&M-EST. PATIENT-LVL IV: CPT | Mod: PBBFAC,HCNC,,

## 2025-01-07 NOTE — PATIENT INSTRUCTIONS
Counseling and Referral of Other Preventative  (Italic type indicates deductible and co-insurance are waived)    Patient Name: Nadia Damon  Today's Date: 1/7/2025    Health Maintenance       Date Due Completion Date    RSV Vaccine (Age 60+ and Pregnant patients) (1 - Risk 60-74 years 1-dose series) Never done ---    COVID-19 Vaccine (7 - 2024-25 season) 09/01/2024 4/27/2023    Mammogram 04/18/2025 4/18/2024    Lipid Panel 07/20/2025 7/20/2024    Colorectal Cancer Screening 02/25/2026 2/25/2021    Hemoglobin A1c (Diabetic Prevention Screening) 07/02/2027 7/2/2024    DEXA Scan 07/03/2027 7/3/2024    TETANUS VACCINE 09/09/2030 9/9/2020        No orders of the defined types were placed in this encounter.    The following information is provided to all patients.  This information is to help you find resources for any of the problems found today that may be affecting your health:                  Living healthy guide: www.[facility].[place].gov      Understanding Diabetes: www.diabetes.org      Eating healthy: www.cdc.gov/healthyweight      CDC home safety checklist: www.cdc.gov/steadi/patient.html      Agency on Aging: www.goea.[place].gov      Alcoholics anonymous (AA): www.aa.org      Physical Activity: www.thea.nih.gov/bx9scpm      Tobacco use: www.quitwithusla.org

## 2025-01-07 NOTE — ASSESSMENT & PLAN NOTE
Component      Latest Ref Rng 11/15/2024 12/12/2024 12/20/2024   RBC      4.00 - 5.40 M/uL 3.23 (L)  3.51 (L)  3.25 (L)    Hemoglobin      12.0 - 16.0 g/dL 9.3 (L)  10.0 (L)  9.4 (L)    Hematocrit      37.0 - 48.5 % 29.8 (L)  31.8 (L)  29.1 (L)      Component      Latest Ref Rng 11/15/2024 12/12/2024 12/20/2024   eGFR      >60 mL/min/1.73 m^2 11.8 !  11.8 !  10 !       Ferritin 20.0 - 300.0 ng/mL 76 81     Saturated Iron 20 - 50 % 23 24 32     Continue to monitor  Refuse dialysis  F/U w/ nephrology and hematology

## 2025-01-07 NOTE — ASSESSMENT & PLAN NOTE
Resume Lasix as previously prescribed.   F/U w/ Transplant MD & Cardiologist     Patient has Diastolic (HFpEF) heart failure that is Chronic. On presentation their CHF was well compensated. Most recent BNP and echo results are listed below.  BNP 0 - 99 pg/mL 764 High  491 High   High   High  CM     Current Heart Failure Medications   carvediloL tablet 3.125 mg, 2 times daily with meals, Oral   Lasix 40 mg daily     Plan  - Monitor strict I&Os and daily weights.    - Cardiology has been consulted  - The patient's volume status is stable - BNP elevated but also renal function uptrending.    Need repeat BNP

## 2025-01-07 NOTE — ASSESSMENT & PLAN NOTE
Controlled this visit  Continue nifedipine, carvedilol, nitro SL  Continue daily activity, low sodium diet, weight loss efforts  BP Readings from Last 3 Encounters:   01/07/25 124/60   12/20/24 123/81   12/20/24 123/81

## 2025-01-07 NOTE — ASSESSMENT & PLAN NOTE
Continue pregabalin, tizandine and OTC tylenol as needed for pain.   Recommend increasing movement as tolerated

## 2025-01-07 NOTE — ASSESSMENT & PLAN NOTE
Up-To-Date w/ recommended age appropirate screening   Up-To-Date w/ recommended vaccines except Covid & RSV  Review for Opioid Screening: Pt does not have Rx for Opioids    Review for Substance Use Disorders: Patient does not use illicit substances as reported

## 2025-01-07 NOTE — ASSESSMENT & PLAN NOTE
[address] [place] [address] CARE  [address] [place] [address] [employer] [id number]  Dept: [phone]  Dept Fax: [phone]    Office Progress Note  Date of patient's visit: 2/5/2020  Patient's Name:  Amy Dumont YOB: 1957            HERBERTH WATSON PA  ================================================================    REASON FOR VISIT/CHIEF COMPLAINT:  6 Month Follow-Up and Hypertension    HISTORY OF PRESENTING ILLNESS:  History was obtained from: patient. Amy Dumont is a 60 y.o. is here for follow up for hypertension. Patient's blood pressure slightly elevated in the office today but patient denies any chest pain, shortness of breath, headaches, dizziness or vision changes. Patient's only complaint today is multiple joint pain mostly in his hands and his right knee. He states that he uses his hands every day for work and has noticed more arthritic type changes. His right knee has intermittently been swelling but he is able to bear weight and do daily activities without difficulty. He denies any redness or warmth to the joint. Orthopedic referral offered and he declined. Patient was scheduled for colonoscopy but the patient states that they called and canceled the day prior to the procedure being done. Patient is agreeable to fit test but states that he does not have time to do the colonoscopy at this point. I did encourage that the colonoscopy done as soon as possible. Patient is due for shingles vaccine but declined. He recently got his flu shot done at his pharmacy. Patient's health maintenance is otherwise up-to-date.       Patient Active Problem List   Diagnosis    Burst fracture of lumbar vertebra (HCC)    Tobacco abuse    HLD (hyperlipidemia)    Iron deficiency    Cervical stenosis of spine    Essential hypertension    Cervical radiculopathy       Health Maintenance Due   Topic Date Due    Colon Cancer Screen FIT/FOBT  03/16/2019 No surgery on hernia recommended  Continue to monitor closely     urinating, dysuria, frequency, hematuria and urgency. Musculoskeletal: Positive for arthralgias and joint swelling ( Right knee). Negative for back pain, gait problem, myalgias, neck pain and neck stiffness. Skin: Negative for color change, pallor and rash. Neurological: Negative for dizziness, syncope, weakness, light-headedness, numbness and headaches. Hematological: Negative for adenopathy. Does not bruise/bleed easily. Psychiatric/Behavioral: Negative for agitation, behavioral problems, confusion, decreased concentration, dysphoric mood, hallucinations, self-injury, sleep disturbance and suicidal ideas. The patient is not nervous/anxious and is not hyperactive. Physical Exam  Vitals signs and nursing note reviewed. Constitutional:       General: He is not in acute distress. Appearance: Normal appearance. He is well-developed and well-groomed. He is not ill-appearing, toxic-appearing or diaphoretic. HENT:      Head: Normocephalic and atraumatic. Right Ear: Tympanic membrane, ear canal and external ear normal.      Left Ear: Tympanic membrane, ear canal and external ear normal.      Nose: Nose normal.      Mouth/Throat:      Lips: Pink. Mouth: Mucous membranes are moist.      Pharynx: Oropharynx is clear. Uvula midline. No oropharyngeal exudate or posterior oropharyngeal erythema. Tonsils: No tonsillar exudate or tonsillar abscesses. Eyes:      General: Lids are normal. No scleral icterus. Right eye: No discharge. Left eye: No discharge. Extraocular Movements: Extraocular movements intact. Conjunctiva/sclera: Conjunctivae normal.      Pupils: Pupils are equal, round, and reactive to light. Neck:      Musculoskeletal: Full passive range of motion without pain, normal range of motion and neck supple. Thyroid: No thyroid mass, thyromegaly or thyroid tenderness. Vascular: No JVD. Trachea: No tracheal deviation.    Cardiovascular: Rate and Rhythm: Normal rate and regular rhythm. Heart sounds: Normal heart sounds, S1 normal and S2 normal. No murmur. No friction rub. No gallop. Pulmonary:      Effort: Pulmonary effort is normal. No respiratory distress. Breath sounds: Normal breath sounds and air entry. No stridor, decreased air movement or transmitted upper airway sounds. No decreased breath sounds, wheezing, rhonchi or rales. Chest:      Chest wall: No tenderness. Abdominal:      General: Abdomen is flat. Bowel sounds are normal. There is no distension. Palpations: Abdomen is soft. There is no mass. Tenderness: There is no abdominal tenderness. There is no right CVA tenderness, left CVA tenderness, guarding or rebound. Musculoskeletal: Normal range of motion. General: No tenderness. Right knee: He exhibits swelling, effusion and bony tenderness. He exhibits normal range of motion, no ecchymosis, no deformity, no laceration, no erythema, normal alignment, no LCL laxity, normal patellar mobility, normal meniscus and no MCL laxity. No tenderness found. Comments: Right knee has a mild effusion. It is not red warm or tender to palpation. He has full range of motion of the knee. No joint laxity. Lymphadenopathy:      Head:      Right side of head: No submental, submandibular, tonsillar, preauricular, posterior auricular or occipital adenopathy. Left side of head: No submental, submandibular, tonsillar, preauricular or posterior auricular adenopathy. Cervical: No cervical adenopathy. Right cervical: No superficial, deep or posterior cervical adenopathy. Left cervical: No superficial, deep or posterior cervical adenopathy. Upper Body:      Right upper body: No supraclavicular adenopathy. Left upper body: No supraclavicular adenopathy. Skin:     General: Skin is warm and dry. Coloration: Skin is not pale. Findings: No erythema or rash.    Neurological: General: No focal deficit present. Mental Status: He is alert and oriented to person, place, and time. Cranial Nerves: Cranial nerves are intact. No cranial nerve deficit. Sensory: Sensation is intact. Motor: Motor function is intact. Coordination: Coordination is intact. Coordination normal.      Gait: Gait is intact. Deep Tendon Reflexes: Reflexes are normal and symmetric. Psychiatric:         Attention and Perception: Attention and perception normal.         Mood and Affect: Mood and affect normal.         Speech: Speech normal.         Behavior: Behavior normal. Behavior is cooperative. Thought Content: Thought content normal.         Cognition and Memory: Cognition and memory normal.         Judgment: Judgment normal.           Vitals:    02/05/20 0809   BP: (!) 159/90   Site: Left Upper Arm   Position: Sitting   Cuff Size: Medium Adult   Pulse: 76   Resp: 16   Temp: 98.3 °F (36.8 °C)   TempSrc: Oral   Weight: 185 lb (83.9 kg)   Height: 5' 10.87\" (1.8 m)     BP Readings from Last 3 Encounters:   02/05/20 (!) 159/90   08/06/19 114/62   07/24/18 136/85              DIAGNOSTIC FINDINGS:  CBC:  Lab Results   Component Value Date    WBC 7.8 11/21/2013    HGB 13.1 11/21/2013     11/21/2013       BMP:    Lab Results   Component Value Date     08/09/2019    K 3.3 08/09/2019    CL 99 08/09/2019    CO2 26 08/09/2019    BUN 12 08/09/2019    CREATININE 1.02 08/09/2019    GLUCOSE 99 08/09/2019         FASTING LIPID PANEL:  Lab Results   Component Value Date    CHOL 117 01/29/2018    HDL 41 08/09/2019    TRIG 83 01/29/2018       No results found for this visit on 02/05/20. ASSESSMENT AND PLAN:   Diagnosis Orders   1. Encounter for FIT (fecal immunochemical test) screening  POCT Fecal Immunochemical Test (FIT)   2. Acute pain of right knee  meloxicam (MOBIC) 7.5 MG tablet    Basic Metabolic Panel   3.  Hypokalemia  Basic Metabolic Panel    potassium chloride (KLOR-CON M) 20 MEQ extended release tablet   4. Pure hypercholesterolemia  atorvastatin (LIPITOR) 20 MG tablet   5. Iron deficiency  ferrous sulfate 325 (65 Fe) MG tablet   6. Essential hypertension  lisinopril (PRINIVIL;ZESTRIL) 20 MG tablet   7. Vitamin D deficiency  Cholecalciferol (VITAMIN D3) 25 MCG (1000 UT) TABS       FOLLOW UP AND INSTRUCTIONS:  · Return in about 6 months (around 8/5/2020), or if symptoms worsen or fail to improve. · Discussed use, benefit, and side effects of prescribed medications. Barriers to medication compliance addressed. All patient questions answered. Pt voiced understanding. · Patient instructed to return to the office if symptoms do not resolve or go directly to the ER if the symptoms worsen - patient voiced understanding. · Patient given educational materials - see patient instructions    Ingram Proc. Wood Nino 1  [facility]  2/5/2020, 8:25 AM    This note is created with the assistance of a speech-recognition program. While intending to generate a document that actually reflects the content of the visit, the document can still have some mistakes which may not have been identified and corrected by editing.

## 2025-01-07 NOTE — ASSESSMENT & PLAN NOTE
Assess and monitor   Continue to massage area  Recommend therapy to increase movement of left upper arm.

## 2025-01-07 NOTE — ASSESSMENT & PLAN NOTE
Discuss avoiding crowds, wear a mask in public   Advise proper hand hygiene   Recommend appropriate vaccinations  Continue to monitor while on cyclosporine

## 2025-01-07 NOTE — ASSESSMENT & PLAN NOTE
Continue nitro SL as needed for angina  Reports weekly CP w/ relief from Nitro  Recommend F/U w/ cardiology

## 2025-01-07 NOTE — ASSESSMENT & PLAN NOTE
Lab Results   Component Value Date    .6 (H) 12/12/2024    CALCIUM 9.7 12/20/2024    CAION 1.13 09/05/2018    PHOS 4.3 12/12/2024    Chronic, stable

## 2025-01-07 NOTE — ASSESSMENT & PLAN NOTE
Monitor BP and cholesterol  Maintain a low fat diet, low sodium diet  Recommend exercise at least 30 minutes daily.

## 2025-01-08 ENCOUNTER — OFFICE VISIT (OUTPATIENT)
Dept: PAIN MEDICINE | Facility: CLINIC | Age: 70
End: 2025-01-08
Payer: MEDICARE

## 2025-01-08 ENCOUNTER — PATIENT MESSAGE (OUTPATIENT)
Dept: TRANSPLANT | Facility: CLINIC | Age: 70
End: 2025-01-08
Payer: MEDICARE

## 2025-01-08 VITALS
DIASTOLIC BLOOD PRESSURE: 105 MMHG | WEIGHT: 150.69 LBS | BODY MASS INDEX: 27.73 KG/M2 | RESPIRATION RATE: 17 BRPM | SYSTOLIC BLOOD PRESSURE: 184 MMHG | HEIGHT: 62 IN

## 2025-01-08 DIAGNOSIS — M51.360 DEGENERATION OF INTERVERTEBRAL DISC OF LUMBAR REGION WITH DISCOGENIC BACK PAIN: ICD-10-CM

## 2025-01-08 DIAGNOSIS — M47.816 LUMBAR SPONDYLOSIS: Primary | ICD-10-CM

## 2025-01-08 DIAGNOSIS — G89.4 CHRONIC PAIN DISORDER: ICD-10-CM

## 2025-01-08 PROCEDURE — 1101F PT FALLS ASSESS-DOCD LE1/YR: CPT | Mod: HCNC,CPTII,S$GLB, | Performed by: NURSE PRACTITIONER

## 2025-01-08 PROCEDURE — 1157F ADVNC CARE PLAN IN RCRD: CPT | Mod: HCNC,CPTII,S$GLB, | Performed by: NURSE PRACTITIONER

## 2025-01-08 PROCEDURE — 3008F BODY MASS INDEX DOCD: CPT | Mod: HCNC,CPTII,S$GLB, | Performed by: NURSE PRACTITIONER

## 2025-01-08 PROCEDURE — 3077F SYST BP >= 140 MM HG: CPT | Mod: HCNC,CPTII,S$GLB, | Performed by: NURSE PRACTITIONER

## 2025-01-08 PROCEDURE — 3288F FALL RISK ASSESSMENT DOCD: CPT | Mod: HCNC,CPTII,S$GLB, | Performed by: NURSE PRACTITIONER

## 2025-01-08 PROCEDURE — 99214 OFFICE O/P EST MOD 30 MIN: CPT | Mod: HCNC,S$GLB,, | Performed by: NURSE PRACTITIONER

## 2025-01-08 PROCEDURE — 99999 PR PBB SHADOW E&M-EST. PATIENT-LVL III: CPT | Mod: PBBFAC,HCNC,, | Performed by: NURSE PRACTITIONER

## 2025-01-08 PROCEDURE — 1125F AMNT PAIN NOTED PAIN PRSNT: CPT | Mod: HCNC,CPTII,S$GLB, | Performed by: NURSE PRACTITIONER

## 2025-01-08 PROCEDURE — 3080F DIAST BP >= 90 MM HG: CPT | Mod: HCNC,CPTII,S$GLB, | Performed by: NURSE PRACTITIONER

## 2025-01-08 PROCEDURE — 1159F MED LIST DOCD IN RCRD: CPT | Mod: HCNC,CPTII,S$GLB, | Performed by: NURSE PRACTITIONER

## 2025-01-08 RX ORDER — METHOCARBAMOL 500 MG/1
500 TABLET, FILM COATED ORAL 2 TIMES DAILY PRN
Qty: 60 TABLET | Refills: 2 | Status: SHIPPED | OUTPATIENT
Start: 2025-01-08 | End: 2025-01-09

## 2025-01-09 ENCOUNTER — OFFICE VISIT (OUTPATIENT)
Dept: PALLIATIVE MEDICINE | Facility: CLINIC | Age: 70
End: 2025-01-09
Payer: MEDICARE

## 2025-01-09 ENCOUNTER — PATIENT MESSAGE (OUTPATIENT)
Dept: CARDIOLOGY | Facility: HOSPITAL | Age: 70
End: 2025-01-09
Payer: MEDICARE

## 2025-01-09 ENCOUNTER — DOCUMENTATION ONLY (OUTPATIENT)
Dept: PALLIATIVE MEDICINE | Facility: HOSPITAL | Age: 70
End: 2025-01-09
Payer: MEDICARE

## 2025-01-09 VITALS
SYSTOLIC BLOOD PRESSURE: 176 MMHG | HEIGHT: 62 IN | BODY MASS INDEX: 27.83 KG/M2 | HEART RATE: 97 BPM | WEIGHT: 151.25 LBS | DIASTOLIC BLOOD PRESSURE: 99 MMHG | TEMPERATURE: 98 F | RESPIRATION RATE: 16 BRPM | OXYGEN SATURATION: 98 %

## 2025-01-09 DIAGNOSIS — M54.50 BILATERAL LOW BACK PAIN, UNSPECIFIED CHRONICITY, UNSPECIFIED WHETHER SCIATICA PRESENT: ICD-10-CM

## 2025-01-09 DIAGNOSIS — M79.7 FIBROMYALGIA: Chronic | ICD-10-CM

## 2025-01-09 DIAGNOSIS — N18.5 CKD (CHRONIC KIDNEY DISEASE) STAGE 5, GFR LESS THAN 15 ML/MIN: Primary | Chronic | ICD-10-CM

## 2025-01-09 DIAGNOSIS — M48.07 SPINAL STENOSIS OF LUMBOSACRAL REGION: Chronic | ICD-10-CM

## 2025-01-09 DIAGNOSIS — M79.604 RIGHT LEG PAIN: ICD-10-CM

## 2025-01-09 DIAGNOSIS — Z51.5 ENCOUNTER FOR PALLIATIVE CARE: ICD-10-CM

## 2025-01-09 PROCEDURE — 99999 PR PBB SHADOW E&M-EST. PATIENT-LVL V: CPT | Mod: PBBFAC,HCNC,, | Performed by: NURSE PRACTITIONER

## 2025-01-09 RX ORDER — NITROGLYCERIN 0.4 MG/1
TABLET SUBLINGUAL
Qty: 100 TABLET | Refills: 0 | Status: SHIPPED | OUTPATIENT
Start: 2025-01-09

## 2025-01-09 RX ORDER — METHOCARBAMOL 500 MG/1
1000 TABLET, FILM COATED ORAL 2 TIMES DAILY PRN
Qty: 120 TABLET | Refills: 2 | Status: SHIPPED | OUTPATIENT
Start: 2025-01-09 | End: 2025-01-11

## 2025-01-09 RX ORDER — PREGABALIN 50 MG/1
50 CAPSULE ORAL NIGHTLY
Qty: 90 CAPSULE | Refills: 0 | Status: SHIPPED | OUTPATIENT
Start: 2025-01-09 | End: 2025-04-09

## 2025-01-09 RX ORDER — LIDOCAINE 50 MG/G
1 PATCH TOPICAL DAILY
Qty: 30 PATCH | Refills: 2 | Status: SHIPPED | OUTPATIENT
Start: 2025-01-09 | End: 2025-04-09

## 2025-01-09 NOTE — PROGRESS NOTES
Nruse reached out to pt and informed her of an u/s venous of leg per Luz Dickson, palliative np.   Pt is aware and will go to apt after her 9am [address] 65 apt, her u/s apt is 10:30 at the [facility] location.

## 2025-01-09 NOTE — PROGRESS NOTES
Palliative Medicine Clinic Note    Chief Complaint: No chief complaint on file.  F/U heart disease, ESRD, ACP, GOC.      ASSESSMENT/PLAN:      Plan/Recommendations:  Problem List Items Addressed This Visit          Neuro    Spinal stenosis of lumbosacral region (Chronic)     Pain is severe an uncontrolled.   Followed by pain mgmt, planning for RFA.   Continue lidoderm, refill sent to pharmacy  Increase pregabalin and methocarbamol doses.               Renal/    CKD (chronic kidney disease) stage 5, GFR less than 15 ml/min - Primary (Chronic)     Renal dose medications.   Requesting to establish with another nephrologist.   Will send appt request to Dr Melgar  Discussed HD procedure, benefits, possible side effects/risks.          Relevant Orders    Ambulatory referral/consult to Nephrology       Orthopedic    Fibromyalgia    Relevant Medications    pregabalin (LYRICA) 50 MG capsule       Palliative Care    Encounter for palliative care     Goals of care: What is most important right now is to focus on remaining as independent as possible, symptom/pain control, and improvement in condition but with limits to invasive therapies.  Advanced directive: Full code. Living will reviewed with pt.   HC POA: Moy Neri  Symptoms: Back pain, anxiety, insomnia  Follow up: 2 weeks, sooner if needed. Virtual okay            Other Visit Diagnoses       Right leg pain        With BLE edema, Rt>Lt. WELLs score 2. Has PFO. Will ultrasound.    Relevant Orders    CV Ultrasound doppler venous DVT leg right    Bilateral low back pain, unspecified chronicity, unspecified whether sciatica present        Relevant Medications    methocarbamoL (ROBAXIN) 500 MG Tab            Advance Care Planning   Advance Directives:   Living Will: Yes        Copy on chart: Yes    Medical Power of : Yes    Goals of Care: The patient endorses that what is most important right now is to focus on remaining as independent as possible, symptom/pain  control, and improvement in condition but with limits to invasive therapies.      Accordingly, we have decided that the best plan to meet the patient's goals includes continuing with treatment        Future Appointments   Date Time Provider Department Center   1/10/2025  9:00 AM Génesis Gonzales LCSW [facility] 65PLUS [facility]   1/16/2025 11:00 AM LABORATORY, [facility] LAB [facility]   1/17/2025 10:00 AM Sami Cochran MD [facility] UROLOGY  Medical C   1/17/2025 10:30 AM Yudith Mendoza NP [facility] HEM ONC [facility]   1/17/2025 11:15 AM INJECTION 1, [facility] INFUSION [facility] INFSN [facility]   1/23/2025  2:30 PM Prasanth Johnson MD [facility] RHEU [facility] C   1/31/2025 10:20 AM Elis Wick MD [facility] 65PLUS [facility]   2/24/2025 12:30 PM SANTIAGO BRANTLEY CC LAB [facility] LAB DS [facility]   3/3/2025  2:00 PM Carter Crawford MD [facility] NEPHRO [facility]   4/3/2025 10:00 AM ONLH MAMMO1 ON MAMMO [facility]   4/9/2025 10:40 AM Christoph Douglas MD [facility] GENSUR [facility]   4/10/2025  9:00 AM Jassi Pierre MD [facility] INT JUDIT [facility]   4/14/2025 11:00 AM Kristine Delgado NP [facility]        SUBJECTIVE:      History of Present Illness / Interval History:  Nadia Damon is 69 y.o. female wi            th HFpEF, s/p heart transplant 1993, CKD 4-5, anemia of chronic disease, breast cancer, spinal stenosis, urinary incontinence.       Presents to Palliative Care Clinic for advance care planning,, clarification of goals of care, and additional support.    1/8/25 Pain mgmt, rx methocarbamol, planning for RFA?    12/19/24 nephrology  Complicated case, multiple issues addressed   fluid gain has returned. Will re-start lasix.  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 There was 50% chronic interstitial fibrosis.  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    Bad experience last hospitalization at [facility].   Doesn't feel like she's doing well overall.   Lyrica not working for pain.   Robaxin not helping pain at current dose.   Ambien not effective for rest. Prescribed lower dose September 2024. She hasn't slept well since.     Per pt HD or PD recommended by nephrology but she is trying to hold off.   Trying to manage electrolytes with medications, Veltessa.     Takes NTG often, this is helpful. Dr Lopez says no more surgeries unless urgent.   RLE edema, painful, numb.     Pain at location of Interstim device, she believes this is contributing. Lots of muscle spasms to lower back.     Today we discussed symptom management, goals of care, and advanced care planning.        ROS:  Review of Systems   Constitutional:  Positive for fatigue. Negative for unexpected weight change.   Respiratory:  Negative for cough, shortness of breath and wheezing.    Cardiovascular:  Positive for leg swelling.   Gastrointestinal:  Negative for abdominal pain.   Genitourinary:  Positive for bladder incontinence. Negative for dysuria.   Musculoskeletal:  Positive for back pain, leg pain (RLE) and myalgias. Negative for gait problem.       Review of Symptoms      Symptom Assessment (ESAS 0-10 Scale)  Pain:  8  Dyspnea:  0  Anxiety:  0  Nausea:  0  Depression:  0  Anorexia:  0  Fatigue:  0  Insomnia:  0  Restlessness:  0  Agitation:  0         Pain Assessment:    Location(s): back    Back       Location: lower        Quality: Cramping and stabbing        Quantity: 8/10 in intensity        Chronicity: Onset 6 month(s) ago, unchanged        Aggravating Factors: Standing and sitting up        Alleviating Factors: None       Associated  Symptoms: None    Performance Status:  60    Living Arrangements:  Lives alone and Lives in apartment    Psychosocial/Cultural:   See Palliative Psychosocial Note: Yes  **Primary  to Follow**  Palliative Care  Consult: No        Medications:    Current Outpatient Medications:     aspirin (ECOTRIN) 81 MG EC tablet, Take 1 tablet (81 mg total) by mouth once daily., Disp: , Rfl:     calcitRIOL (ROCALTROL) 0.25 MCG Cap, Take 1 capsule (0.25 mcg total) by mouth once daily., Disp: 30 capsule, Rfl: 11    carvediloL (COREG) 3.125 MG tablet, Take 1 tablet (3.125 mg total) by mouth 2 (two) times daily with meals. Hold parameters: SBP less than 110 and/or DBP less than 75, Disp: 180 tablet, Rfl: 3    COVID-19 (COMIRNATY 2024-25, 12Y UP,,PF,) 30 mcg/0.3 mL IM vaccine (>/= 13 yo), Inject 0.5 mLs into the muscle once. for 1 dose, Disp: 0.5 mL, Rfl: 0    furosemide (LASIX) 20 MG tablet, Take 2 tablets (40 mg total) by mouth once daily. HOLD UNTIL FOLLOW UP WITH PCP, Disp: 60 tablet, Rfl: 11    NIFEdipine (PROCARDIA-XL) 30 MG (OSM) 24 hr tablet, Take 30 mg by mouth once daily., Disp: , Rfl:     nitroGLYCERIN (NITROSTAT) 0.4 MG SL tablet, PLACE 1 TABLET UNDER THE TONGUE EVERY 5 MINS AS NEEDED FOR CHEST PAIN FOR UP TO 3 DOSES.IF CONTINUED HEART PAIN/PRESSURE AFTER, Disp: 100 tablet, Rfl: 0    ondansetron (ZOFRAN) 4 MG tablet, Take 4 mg by mouth as needed for Nausea., Disp: , Rfl:     pantoprazole (PROTONIX) 20 MG tablet, TAKE 1 TABLET ONE TIME DAILY, Disp: 90 tablet, Rfl: 3    polyethylene glycol (GLYCOLAX) 17 gram PwPk, Take 17 g by mouth once daily., Disp: , Rfl:     sodium bicarbonate 650 MG tablet, Take 1 tablet (650 mg total) by mouth 2 (two) times daily., Disp: 60 tablet, Rfl: 1    temazepam (RESTORIL) 30 mg capsule, Take 1 capsule (30 mg total) by mouth nightly as needed for Insomnia. FOR INSOMNIA, Disp: 90 capsule, Rfl: 1    vitamin E 400 UNIT capsule, Take 400 Units by mouth once daily., Disp: ,  Rfl:     zolpidem (AMBIEN) 5 MG Tab, Take 1 tablet (5 mg total) by mouth every evening., Disp: 90 tablet, Rfl: 1    cycloSPORINE modified, NEORAL, (NEORAL) 25 MG capsule, Take 3 capsules (75 mg total) by mouth 2 (two) times daily., Disp: 540 capsule, Rfl: 1    LIDOcaine (LIDODERM) 5 %, Place 1 patch onto the skin once daily. Remove & Discard patch within 12 hours or as directed by MD, Disp: 30 patch, Rfl: 2    methocarbamoL (ROBAXIN) 500 MG Tab, Take 2 tablets (1,000 mg total) by mouth 2 (two) times daily as needed (muscle spasms)., Disp: 120 tablet, Rfl: 2    pregabalin (LYRICA) 50 MG capsule, Take 1 capsule (50 mg total) by mouth every evening., Disp: 90 capsule, Rfl: 0    tiZANidine (ZANAFLEX) 2 MG tablet, Take 1 tablet (2 mg total) by mouth every evening. (Patient not taking: Reported on 1/9/2025), Disp: 90 tablet, Rfl: 1    External  database queried on 01/09/2025  by Luz CORTEZ :         Review of patient's allergies indicates:   Allergen Reactions    Dobutamine Other (See Comments)     Severe Left sided chest pain after dosage administered for Dobutamine Stress Test; itching    Lisinopril Swelling and Rash    Nitro Shortness Of Breath     Audible wheezing - after Nitrolgycerin Spray administered x 2; itching    Hydrocodone-acetaminophen Nausea Only    Augmentin [amoxicillin-pot clavulanate] Diarrhea    Zyvox [linezolid] Nausea And Vomiting        OBJECTIVE:   Physical Exam:  Vitals: Temp: 98.3 °F (36.8 °C) (01/09/25 1320)  Pulse: 97 (01/09/25 1320)  Resp: 16 (01/09/25 1320)  BP: (!) 176/99 (01/09/25 1320)  SpO2: 98 % (01/09/25 1320)    Physical Exam  Eyes:      General: No scleral icterus.  Cardiovascular:      Rate and Rhythm: Normal rate and regular rhythm.      Heart sounds: Murmur heard.   Abdominal:      General: There is no distension.      Palpations: Abdomen is soft.      Tenderness: There is no abdominal tenderness.   Musculoskeletal:      Right lower leg: Edema (3+) present.       Left lower leg: Edema (2+) present.   Skin:     General: Skin is warm and dry.   Neurological:      Mental Status: She is alert. Mental status is at baseline.   Psychiatric:         Behavior: Behavior normal.         Thought Content: Thought content normal.         Wt Readings from Last 3 Encounters:   01/09/25 1320 68.6 kg (151 lb 3.8 oz)   01/08/25 1044 68.3 kg (150 lb 11 oz)   01/07/25 0841 68.2 kg (150 lb 3.9 oz)     BP Readings from Last 3 Encounters:   01/09/25 (!) 176/99   01/08/25 (!) 184/105   01/07/25 124/60       Labs:  Lab Results   Component Value Date    WBC 2.76 (L) 12/20/2024    HGB 9.4 (L) 12/20/2024    HCT 29.1 (L) 12/20/2024    MCV 90 12/20/2024     12/20/2024       Sodium   Date Value Ref Range Status   12/20/2024 144 136 - 145 mmol/L Final     Potassium   Date Value Ref Range Status   12/20/2024 4.2 3.5 - 5.1 mmol/L Final     Glucose   Date Value Ref Range Status   12/20/2024 96 70 - 110 mg/dL Final     BUN   Date Value Ref Range Status   12/20/2024 55 (H) 8 - 23 mg/dL Final     Creatinine   Date Value Ref Range Status   12/20/2024 4.4 (H) 0.5 - 1.4 mg/dL Final     Calcium   Date Value Ref Range Status   12/20/2024 9.7 8.7 - 10.5 mg/dL Final     Total Protein   Date Value Ref Range Status   12/20/2024 7.6 6.0 - 8.4 g/dL Final     Albumin   Date Value Ref Range Status   12/20/2024 3.4 (L) 3.5 - 5.2 g/dL Final     AST   Date Value Ref Range Status   12/20/2024 44 (H) 10 - 40 U/L Final     ALT   Date Value Ref Range Status   12/20/2024 26 10 - 44 U/L Final     eGFR   Date Value Ref Range Status   12/20/2024 10 (A) >60 mL/min/1.73 m^2 Final       Imaging:  FL Fluoro for Pain Management  See OP Notes for results.     IMPRESSION: See OP Notes for results.     This procedure was auto-finalized by: Virtual Radiologist       I spent a total of 75 minutes on the day of the visit. This includes face to face time in discussion of goals of care, symptom assessment, coordination of care and emotional  support.  This also includes non-face to face time preparing to see the patient (eg, review of tests/imaging), obtaining and/or reviewing separately obtained history, documenting clinical information in the electronic or other health record, independently interpreting results and communicating results to the patient/family/caregiver, or care coordinator.     Additional 24 min time spent on a voluntary advance care planning and /or goals of care discussion, providing emotional support, formulating and communicating prognosis and exploring burden/benefit of various approaches of treatment.       Luz Dickson, NP

## 2025-01-09 NOTE — ASSESSMENT & PLAN NOTE
Goals of care: What is most important right now is to focus on remaining as independent as possible, symptom/pain control, and improvement in condition but with limits to invasive therapies.  Advanced directive: Full code. Living will reviewed with pt.   HC POA: Moy Neri  Symptoms: Back pain, anxiety, insomnia  Follow up: 2 weeks, sooner if needed. Virtual okay

## 2025-01-09 NOTE — ASSESSMENT & PLAN NOTE
Renal dose medications.   Requesting to establish with another nephrologist.   Will send appt request to Dr Melgar  Discussed HD procedure, benefits, possible side effects/risks.

## 2025-01-09 NOTE — Clinical Note
Hi - Ms Nelson is having lots of LBP. I've increased her Lyrica and Robaxin doses slightly and will f/u with her next week regarding pain. She saw pain mgmt yesterday and they are scheduling another RFA.  She also has significant RLE pain and BLE swelling, Rt>Lt.  She believes the RLE swelling is more painful than her usual edema. Seems radicular. I've also ordered venous U/S since she has that PFO. WELLs score was 2.  She's asked for her higher dose of Ambien again. I've asked her to get pain better controlled with Lyrica and Robaxin first then can reevaluate.  She wants to see a new nephrologist so I've sent referral to Dr Melgar.  Miss you and hope you are well! Luz
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
## 2025-01-09 NOTE — ASSESSMENT & PLAN NOTE
Pain is severe an uncontrolled.   Followed by pain mgmt, planning for RFA.   Continue lidoderm, refill sent to pharmacy  Increase pregabalin and methocarbamol doses.

## 2025-01-10 ENCOUNTER — TELEPHONE (OUTPATIENT)
Dept: PALLIATIVE MEDICINE | Facility: CLINIC | Age: 70
End: 2025-01-10
Payer: MEDICARE

## 2025-01-10 ENCOUNTER — HOSPITAL ENCOUNTER (OUTPATIENT)
Dept: CARDIOLOGY | Facility: HOSPITAL | Age: 70
Discharge: HOME | End: 2025-01-10
Attending: NURSE PRACTITIONER
Payer: MEDICARE

## 2025-01-10 ENCOUNTER — CLINICAL SUPPORT (OUTPATIENT)
Dept: PRIMARY CARE CLINIC | Facility: CLINIC | Age: 70
End: 2025-01-10
Payer: MEDICARE

## 2025-01-10 VITALS — WEIGHT: 151 LBS | BODY MASS INDEX: 27.79 KG/M2 | HEIGHT: 62 IN

## 2025-01-10 DIAGNOSIS — F41.8 DEPRESSION WITH ANXIETY: Primary | ICD-10-CM

## 2025-01-10 DIAGNOSIS — M79.604 RIGHT LEG PAIN: ICD-10-CM

## 2025-01-10 DIAGNOSIS — M79.7 FIBROMYALGIA: Chronic | ICD-10-CM

## 2025-01-10 DIAGNOSIS — M54.50 BILATERAL LOW BACK PAIN, UNSPECIFIED CHRONICITY, UNSPECIFIED WHETHER SCIATICA PRESENT: ICD-10-CM

## 2025-01-10 DIAGNOSIS — Z94.1 HEART TRANSPLANTED: Chronic | ICD-10-CM

## 2025-01-10 DIAGNOSIS — F51.01 PRIMARY INSOMNIA: Primary | ICD-10-CM

## 2025-01-10 PROCEDURE — 93971 EXTREMITY STUDY: CPT | Mod: HCNC,RT

## 2025-01-10 PROCEDURE — 93971 EXTREMITY STUDY: CPT | Mod: 26,HCNC,RT, | Performed by: INTERNAL MEDICINE

## 2025-01-10 NOTE — TELEPHONE ENCOUNTER
----- Message from Marly sent at 1/10/2025 10:20 AM CST -----  Contact: Nadia [phone]  Would like to receive medical advice.    Would they like a call back or a response via [facility]:  call back    Additional information:  Nadia is calling to speak to the provider or staff. Nadia states the lyft is dropping her off at her house and she will be heading the the location on [address] to do her X-ray. Nadia states she just got out of her appt and it's 10:20am right now. Please call Nadia back for advice

## 2025-01-10 NOTE — TELEPHONE ENCOUNTER
Good Morning/Afternoon,     Pt is requesting for a refill of: Robaxin 500mg (ANOTHER PROVIDER FILLS THIS MEDICATION)  Last filed: 01/10/2025  Last encounter: 01/08/2025  Up coming apt: 04/10/2025  Pharmacy: [facility] - [facility] [address]     Medical Assistant  Karen PATTERSON ([facility])

## 2025-01-11 RX ORDER — METHOCARBAMOL 500 MG/1
500 TABLET, FILM COATED ORAL 3 TIMES DAILY
Qty: 180 TABLET | Refills: 0 | Status: SHIPPED | OUTPATIENT
Start: 2025-01-11 | End: 2025-01-16

## 2025-01-13 ENCOUNTER — DOCUMENTATION ONLY (OUTPATIENT)
Dept: PALLIATIVE MEDICINE | Facility: HOSPITAL | Age: 70
End: 2025-01-13
Payer: MEDICARE

## 2025-01-13 ENCOUNTER — TELEPHONE (OUTPATIENT)
Dept: PAIN MEDICINE | Facility: CLINIC | Age: 70
End: 2025-01-13
Payer: MEDICARE

## 2025-01-13 ENCOUNTER — TELEPHONE (OUTPATIENT)
Dept: PRIMARY CARE CLINIC | Facility: CLINIC | Age: 70
End: 2025-01-13
Payer: MEDICARE

## 2025-01-13 NOTE — PROGRESS NOTES
Palliative Nurse Note:    Nurse reached out to pt and notified her the medical examiner's certification of mobility impairment form has been completed by Luz Dickson and ready for , pt was happy and stated she can pick it up from the cancer center tomorrow morning, this the location I informed her we will be located at on tomorrow. A copy has been scanned in pt's chart.    Xolair Pregnancy And Lactation Text: This medication is Pregnancy Category B and is considered safe during pregnancy. This medication is excreted in breast milk.

## 2025-01-13 NOTE — TELEPHONE ENCOUNTER
Phone call to pt to determine whether or not she is still taking Tizanidine.      Pt states she is still taking it but is now taking 2 tabs twice daily.  She saw Mona Dickson last week and she increased her Tizanidine to this dosage.    [facility] would [facility]    She said she did receive her NTG tablets.

## 2025-01-13 NOTE — TELEPHONE ENCOUNTER
----- Message from Sofy sent at 1/13/2025 11:58 AM CST -----  Contact: Nadia  Type:  Needs Medical Advice    Who Called: Nadia  Symptoms (please be specific): Medication is not working/Pain/High blood pressure   How long has patient had these symptoms: Unknown  Pharmacy name and phone #:    [facility]  [address] [place]  [place] [address]  Phone: [phone] Fax: [phone]  Would the patient rather a call back or a response via [facility]? call  Best Call Back Number: [phone]   Additional Information: Patient reports prescription methocarbamol 750 mg is not working well and began experiencing pain and started taking the tizanidine 2 mg prescription again. Please give patient a call back to provide further assistance.   Thank you,  GH

## 2025-01-13 NOTE — TELEPHONE ENCOUNTER
Called patient and patient informed me that she stopped taking the robaxin and started back taking the tizanidine being that the robaxin wasn't effective in stopping the pain. Patient informed me her other doctor Mona Dickson NP said to take 2 2mg tizanidine in the am and 2 2mg in the PM patient verbalized understanding. She said it takes the edge of. I informed her I would let the provider know.    Yonatan PALMER

## 2025-01-14 ENCOUNTER — TELEPHONE (OUTPATIENT)
Dept: PRIMARY CARE CLINIC | Facility: CLINIC | Age: 70
End: 2025-01-14
Payer: MEDICARE

## 2025-01-14 RX ORDER — CYCLOSPORINE 25 MG/1
75 CAPSULE, LIQUID FILLED ORAL 2 TIMES DAILY
Qty: 540 CAPSULE | Refills: 0 | Status: SHIPPED | OUTPATIENT
Start: 2025-01-14

## 2025-01-14 RX ORDER — PREGABALIN 50 MG/1
50 CAPSULE ORAL 2 TIMES DAILY
Qty: 180 CAPSULE | OUTPATIENT
Start: 2025-01-14

## 2025-01-14 NOTE — TELEPHONE ENCOUNTER
Patient called to discuss possible assistance with transportation to appointments at the [facility].  LCSW inquired of [address] staff if this was a possibility and was informed that this is not possible.  Explained to pt that [address] can only arrange rides for appts at the [facility].  Patient says she has transportation benefits available through [employer]; she will use that benefit to arrange her rides.  Messaged [facility] NP, Luz LYMAN, to advise her of same.

## 2025-01-15 DIAGNOSIS — F51.01 PRIMARY INSOMNIA: Primary | ICD-10-CM

## 2025-01-16 ENCOUNTER — LAB VISIT (OUTPATIENT)
Dept: LAB | Facility: HOSPITAL | Age: 70
End: 2025-01-16
Attending: INTERNAL MEDICINE
Payer: MEDICARE

## 2025-01-16 DIAGNOSIS — F51.01 PRIMARY INSOMNIA: ICD-10-CM

## 2025-01-16 DIAGNOSIS — Z51.81 MEDICATION MONITORING ENCOUNTER: ICD-10-CM

## 2025-01-16 DIAGNOSIS — R94.6 BORDERLINE ABNORMAL TFTS: ICD-10-CM

## 2025-01-16 DIAGNOSIS — D63.1 ANEMIA ASSOCIATED WITH STAGE 4 CHRONIC RENAL FAILURE: ICD-10-CM

## 2025-01-16 DIAGNOSIS — N18.4 ANEMIA ASSOCIATED WITH STAGE 4 CHRONIC RENAL FAILURE: ICD-10-CM

## 2025-01-16 DIAGNOSIS — E83.51 HYPOCALCEMIA: ICD-10-CM

## 2025-01-16 LAB
ALBUMIN SERPL BCP-MCNC: 3.4 G/DL (ref 3.5–5.2)
ALP SERPL-CCNC: 100 U/L (ref 40–150)
ALT SERPL W/O P-5'-P-CCNC: 30 U/L (ref 10–44)
ANION GAP SERPL CALC-SCNC: 12 MMOL/L (ref 8–16)
AST SERPL-CCNC: 48 U/L (ref 10–40)
BASOPHILS # BLD AUTO: 0.02 K/UL (ref 0–0.2)
BASOPHILS NFR BLD: 0.6 % (ref 0–1.9)
BILIRUB SERPL-MCNC: 0.5 MG/DL (ref 0.1–1)
BUN SERPL-MCNC: 57 MG/DL (ref 8–23)
CALCIUM SERPL-MCNC: 9.3 MG/DL (ref 8.7–10.5)
CHLORIDE SERPL-SCNC: 105 MMOL/L (ref 95–110)
CO2 SERPL-SCNC: 27 MMOL/L (ref 23–29)
CREAT SERPL-MCNC: 4.1 MG/DL (ref 0.5–1.4)
DIFFERENTIAL METHOD BLD: ABNORMAL
EOSINOPHIL # BLD AUTO: 0.1 K/UL (ref 0–0.5)
EOSINOPHIL NFR BLD: 4.2 % (ref 0–8)
ERYTHROCYTE [DISTWIDTH] IN BLOOD BY AUTOMATED COUNT: 15.6 % (ref 11.5–14.5)
EST. GFR  (NO RACE VARIABLE): 11 ML/MIN/1.73 M^2
GLUCOSE SERPL-MCNC: 93 MG/DL (ref 70–110)
HCT VFR BLD AUTO: 31 % (ref 37–48.5)
HGB BLD-MCNC: 9.9 G/DL (ref 12–16)
IMM GRANULOCYTES # BLD AUTO: 0.01 K/UL (ref 0–0.04)
IMM GRANULOCYTES NFR BLD AUTO: 0.3 % (ref 0–0.5)
LYMPHOCYTES # BLD AUTO: 1 K/UL (ref 1–4.8)
LYMPHOCYTES NFR BLD: 31.5 % (ref 18–48)
MCH RBC QN AUTO: 29.1 PG (ref 27–31)
MCHC RBC AUTO-ENTMCNC: 31.9 G/DL (ref 32–36)
MCV RBC AUTO: 91 FL (ref 82–98)
MONOCYTES # BLD AUTO: 0.5 K/UL (ref 0.3–1)
MONOCYTES NFR BLD: 15.4 % (ref 4–15)
NEUTROPHILS # BLD AUTO: 1.5 K/UL (ref 1.8–7.7)
NEUTROPHILS NFR BLD: 48 % (ref 38–73)
NRBC BLD-RTO: 0 /100 WBC
PLATELET # BLD AUTO: 190 K/UL (ref 150–450)
PMV BLD AUTO: 9.8 FL (ref 9.2–12.9)
POTASSIUM SERPL-SCNC: 5.1 MMOL/L (ref 3.5–5.1)
PROT SERPL-MCNC: 7.7 G/DL (ref 6–8.4)
RBC # BLD AUTO: 3.4 M/UL (ref 4–5.4)
SODIUM SERPL-SCNC: 144 MMOL/L (ref 136–145)
T4 FREE SERPL-MCNC: 0.91 NG/DL (ref 0.71–1.51)
TSH SERPL DL<=0.005 MIU/L-ACNC: 9.13 UIU/ML (ref 0.4–4)
WBC # BLD AUTO: 3.11 K/UL (ref 3.9–12.7)

## 2025-01-16 PROCEDURE — 36415 COLL VENOUS BLD VENIPUNCTURE: CPT | Mod: HCNC | Performed by: INTERNAL MEDICINE

## 2025-01-16 PROCEDURE — 85025 COMPLETE CBC W/AUTO DIFF WBC: CPT | Mod: HCNC | Performed by: NURSE PRACTITIONER

## 2025-01-16 PROCEDURE — 84439 ASSAY OF FREE THYROXINE: CPT | Mod: HCNC | Performed by: INTERNAL MEDICINE

## 2025-01-16 PROCEDURE — 84443 ASSAY THYROID STIM HORMONE: CPT | Mod: HCNC | Performed by: INTERNAL MEDICINE

## 2025-01-16 PROCEDURE — 80053 COMPREHEN METABOLIC PANEL: CPT | Mod: HCNC | Performed by: INTERNAL MEDICINE

## 2025-01-16 RX ORDER — TIZANIDINE 2 MG/1
4 TABLET ORAL 2 TIMES DAILY
Qty: 120 TABLET | Refills: 5 | Status: SHIPPED | OUTPATIENT
Start: 2025-01-16 | End: 2025-01-16 | Stop reason: SDUPTHER

## 2025-01-16 RX ORDER — ZOLPIDEM TARTRATE 5 MG/1
5 TABLET ORAL NIGHTLY
Qty: 30 TABLET | Refills: 5 | Status: SHIPPED | OUTPATIENT
Start: 2025-01-16 | End: 2025-07-15

## 2025-01-16 RX ORDER — TEMAZEPAM 30 MG/1
CAPSULE ORAL
Qty: 30 CAPSULE | OUTPATIENT
Start: 2025-01-16

## 2025-01-16 RX ORDER — TIZANIDINE 2 MG/1
4 TABLET ORAL 2 TIMES DAILY
Qty: 120 TABLET | Refills: 5 | Status: SHIPPED | OUTPATIENT
Start: 2025-01-16 | End: 2025-07-15

## 2025-01-16 RX ORDER — TEMAZEPAM 30 MG/1
30 CAPSULE ORAL NIGHTLY PRN
Qty: 30 CAPSULE | Refills: 5 | Status: SHIPPED | OUTPATIENT
Start: 2025-01-16 | End: 2025-07-15

## 2025-01-16 RX ORDER — ZOLPIDEM TARTRATE 5 MG/1
5 TABLET ORAL NIGHTLY
Qty: 30 TABLET | Refills: 5 | Status: SHIPPED | OUTPATIENT
Start: 2025-01-16 | End: 2025-01-16 | Stop reason: SDUPTHER

## 2025-01-17 ENCOUNTER — OFFICE VISIT (OUTPATIENT)
Dept: HEMATOLOGY/ONCOLOGY | Facility: CLINIC | Age: 70
End: 2025-01-17
Payer: MEDICARE

## 2025-01-17 ENCOUNTER — INFUSION (OUTPATIENT)
Dept: INFUSION THERAPY | Facility: HOSPITAL | Age: 70
End: 2025-01-17
Attending: INTERNAL MEDICINE
Payer: MEDICARE

## 2025-01-17 ENCOUNTER — OFFICE VISIT (OUTPATIENT)
Dept: UROLOGY | Facility: CLINIC | Age: 70
End: 2025-01-17
Payer: MEDICARE

## 2025-01-17 VITALS
SYSTOLIC BLOOD PRESSURE: 171 MMHG | HEART RATE: 94 BPM | RESPIRATION RATE: 18 BRPM | DIASTOLIC BLOOD PRESSURE: 84 MMHG | WEIGHT: 148.13 LBS | BODY MASS INDEX: 27.26 KG/M2 | HEIGHT: 62 IN

## 2025-01-17 VITALS
TEMPERATURE: 98 F | DIASTOLIC BLOOD PRESSURE: 88 MMHG | WEIGHT: 147.69 LBS | DIASTOLIC BLOOD PRESSURE: 88 MMHG | BODY MASS INDEX: 27.18 KG/M2 | TEMPERATURE: 98 F | OXYGEN SATURATION: 97 % | HEART RATE: 83 BPM | RESPIRATION RATE: 18 BRPM | OXYGEN SATURATION: 98 % | HEART RATE: 96 BPM | SYSTOLIC BLOOD PRESSURE: 162 MMHG | SYSTOLIC BLOOD PRESSURE: 161 MMHG | HEIGHT: 62 IN

## 2025-01-17 DIAGNOSIS — N39.3 SUI (STRESS URINARY INCONTINENCE, FEMALE): ICD-10-CM

## 2025-01-17 DIAGNOSIS — N99.3 PROLAPSE OF VAGINAL CUFF AFTER HYSTERECTOMY: ICD-10-CM

## 2025-01-17 DIAGNOSIS — N39.41 URGE INCONTINENCE OF URINE: Primary | ICD-10-CM

## 2025-01-17 DIAGNOSIS — N18.5 ANEMIA ASSOCIATED WITH STAGE 5 CHRONIC RENAL FAILURE: ICD-10-CM

## 2025-01-17 DIAGNOSIS — N18.5 ANEMIA ASSOCIATED WITH STAGE 5 CHRONIC RENAL FAILURE: Primary | Chronic | ICD-10-CM

## 2025-01-17 DIAGNOSIS — D63.1 ANEMIA ASSOCIATED WITH STAGE 5 CHRONIC RENAL FAILURE: Primary | Chronic | ICD-10-CM

## 2025-01-17 DIAGNOSIS — D63.1 ANEMIA ASSOCIATED WITH STAGE 5 CHRONIC RENAL FAILURE: ICD-10-CM

## 2025-01-17 DIAGNOSIS — M81.8 OTHER OSTEOPOROSIS WITHOUT CURRENT PATHOLOGICAL FRACTURE: Primary | ICD-10-CM

## 2025-01-17 DIAGNOSIS — N30.00 ACUTE CYSTITIS WITHOUT HEMATURIA: ICD-10-CM

## 2025-01-17 LAB
BILIRUB UR QL STRIP: NEGATIVE
GLUCOSE UR QL STRIP: NEGATIVE
KETONES UR QL STRIP: NEGATIVE
LEUKOCYTE ESTERASE UR QL STRIP: NEGATIVE
PH, POC UA: 6.5
POC BLOOD, URINE: POSITIVE
POC NITRATES, URINE: NEGATIVE
PROT UR QL STRIP: POSITIVE
SP GR UR STRIP: 1.01 (ref 1–1.03)
UROBILINOGEN UR STRIP-ACNC: 0.2 (ref 0.1–1.1)

## 2025-01-17 PROCEDURE — 3008F BODY MASS INDEX DOCD: CPT | Mod: HCNC,CPTII,S$GLB, | Performed by: NURSE PRACTITIONER

## 2025-01-17 PROCEDURE — 1125F AMNT PAIN NOTED PAIN PRSNT: CPT | Mod: HCNC,CPTII,S$GLB, | Performed by: NURSE PRACTITIONER

## 2025-01-17 PROCEDURE — 1157F ADVNC CARE PLAN IN RCRD: CPT | Mod: HCNC,CPTII,S$GLB, | Performed by: UROLOGY

## 2025-01-17 PROCEDURE — 99999 PR PBB SHADOW E&M-EST. PATIENT-LVL IV: CPT | Mod: PBBFAC,HCNC,, | Performed by: NURSE PRACTITIONER

## 2025-01-17 PROCEDURE — 1157F ADVNC CARE PLAN IN RCRD: CPT | Mod: HCNC,CPTII,S$GLB, | Performed by: NURSE PRACTITIONER

## 2025-01-17 PROCEDURE — 99214 OFFICE O/P EST MOD 30 MIN: CPT | Mod: 25,HCNC,S$GLB, | Performed by: NURSE PRACTITIONER

## 2025-01-17 PROCEDURE — 3079F DIAST BP 80-89 MM HG: CPT | Mod: HCNC,CPTII,S$GLB, | Performed by: NURSE PRACTITIONER

## 2025-01-17 PROCEDURE — 1159F MED LIST DOCD IN RCRD: CPT | Mod: HCNC,CPTII,S$GLB, | Performed by: NURSE PRACTITIONER

## 2025-01-17 PROCEDURE — 1126F AMNT PAIN NOTED NONE PRSNT: CPT | Mod: HCNC,CPTII,S$GLB, | Performed by: UROLOGY

## 2025-01-17 PROCEDURE — 63600175 PHARM REV CODE 636 W HCPCS: Mod: JZ,EC,TB,HCNC | Performed by: NURSE PRACTITIONER

## 2025-01-17 PROCEDURE — 3077F SYST BP >= 140 MM HG: CPT | Mod: HCNC,CPTII,S$GLB, | Performed by: NURSE PRACTITIONER

## 2025-01-17 PROCEDURE — 96372 THER/PROPH/DIAG INJ SC/IM: CPT | Mod: HCNC

## 2025-01-17 PROCEDURE — 1101F PT FALLS ASSESS-DOCD LE1/YR: CPT | Mod: HCNC,CPTII,S$GLB, | Performed by: UROLOGY

## 2025-01-17 PROCEDURE — 3079F DIAST BP 80-89 MM HG: CPT | Mod: HCNC,CPTII,S$GLB, | Performed by: UROLOGY

## 2025-01-17 PROCEDURE — 99999 PR PBB SHADOW E&M-EST. PATIENT-LVL V: CPT | Mod: PBBFAC,HCNC,, | Performed by: UROLOGY

## 2025-01-17 PROCEDURE — 1160F RVW MEDS BY RX/DR IN RCRD: CPT | Mod: HCNC,CPTII,S$GLB, | Performed by: NURSE PRACTITIONER

## 2025-01-17 PROCEDURE — 3288F FALL RISK ASSESSMENT DOCD: CPT | Mod: HCNC,CPTII,S$GLB, | Performed by: UROLOGY

## 2025-01-17 PROCEDURE — 81003 URINALYSIS AUTO W/O SCOPE: CPT | Mod: QW,HCNC,S$GLB, | Performed by: UROLOGY

## 2025-01-17 PROCEDURE — 3008F BODY MASS INDEX DOCD: CPT | Mod: HCNC,CPTII,S$GLB, | Performed by: UROLOGY

## 2025-01-17 PROCEDURE — 99214 OFFICE O/P EST MOD 30 MIN: CPT | Mod: HCNC,S$GLB,, | Performed by: UROLOGY

## 2025-01-17 PROCEDURE — 3077F SYST BP >= 140 MM HG: CPT | Mod: HCNC,CPTII,S$GLB, | Performed by: UROLOGY

## 2025-01-17 RX ORDER — CLINDAMYCIN PHOSPHATE 900 MG/50ML
900 INJECTION, SOLUTION INTRAVENOUS
OUTPATIENT
Start: 2025-01-17

## 2025-01-17 RX ADMIN — EPOETIN ALFA-EPBX 20000 UNITS: 20000 INJECTION, SOLUTION INTRAVENOUS; SUBCUTANEOUS at 01:01

## 2025-01-17 NOTE — PROGRESS NOTES
Chief Complaint:   Encounter Diagnoses   Name Primary?    Urge incontinence of urine Yes    FRANKY (stress urinary incontinence, female)     Acute cystitis without hematuria     Prolapse of vaginal cuff after hysterectomy          HPI:   1/17/25- leakage is improved with the InterStim but she has had persistent back pain and believes that the InterStim is causing this pain.  No real problems with stress incontinence.    5/1/24- patient is here today for the 1st time to see me from [place], she is here today to discuss surgical management for urge incontinence as she has failed medical management.  No specific evidence of stress incontinence or UTI.    11/29/23- LR- Patient is a 68-year-old female that is presenting as a follow-up to overactive bladder.  Patient has urge incontinence and is wearing a pad that she changes several times a day.  She was prescribed Myrbetriq, however, was hospitalized secondary to an increase in potassium.  Patient reports that primary care provider discontinued Myrbetriq secondary to possible interaction related to increase potassium level.  Patient states that medication decreasing her urge incontinence and daytime frequency.  Is requesting a new medication or treatment option.       Allergies:  Lisinopril, Augmentin [amoxicillin-pot clavulanate], and Zyvox [linezolid]    Medications:  has a current medication list which includes the following prescription(s): albuterol, benzonatate, biotin, calcitonin (salmon), carvedilol, cyclosporine modified (neoral), diazepam, ergocalciferol, evening primrose oil, fluticasone propionate, furosemide, hydralazine, hydrocortisone, lidocaine, multivitamin, nifedipine, pantoprazole, patiromer calcium sorbitex, polyethylene glycol, pregabalin, psyllium husk, retacrit, temazepam, UNABLE TO FIND, vitamin e, and zolpidem, and the following Facility-Administered Medications: acetaminophen and famotidine.    Review of Systems:  General: No fever, chills,  fatigability, or weight loss.  Skin: No rashes, itching, or changes in color or texture of skin.  Chest: Denies DONOHUE, cyanosis, wheezing, cough, and sputum production.  Abdomen: Appetite fine. No weight loss. Denies diarrhea, abdominal pain, hematemesis, or blood in stool.  Musculoskeletal: No joint stiffness or swelling. Denies back pain.  : As above.  All other review of systems negative.    PMH:   has a past medical history of Abdominal wall hernia, Abnormal mammogram (10/12/2021), GRACE (acute kidney injury) (11/22/2021), Anxiety, Arthritis, Breast cancer in female (08/2021), Bronchitis (08/18/2016), C. difficile colitis (11/29/2021), Cellulitis of axilla, left (12/23/2021), Chronic diastolic heart failure (12/16/2021), Chronic kidney disease, Chronic midline low back pain without sciatica (06/18/2018), Closed nondisplaced fracture of distal phalanx of left great toe with routine healing (10/22/2018), Coronary artery disease (1993), COVID-19 in immunocompromised patient (02/26/2024), Cystitis (05/10/2022), Depression, Encounter for blood transfusion, Fibromyalgia, Heart failure, Heart transplanted (1993), History of hyperparathyroidism; Hyperparathyroidism, secondary renal, Hypertension, Immune disorder, 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    PSH:   has a past surgical history that includes Cardiac pacemaker removal (Left, 06/26/2014); Bladder surgery (2015 approx);  Carpal tunnel release (Left, 03/03/2015); Hysterectomy (1983); Hernia repair (Right, 1971 approx); Breast surgery (Left, 09/28/2015); Breast surgery (Right, 12/2015); Toe Surgery; Colonoscopy (N/A, 02/25/2021); Breast biopsy (Bilateral); Heart transplant (1993); [place] lymph node biopsy (Left, 10/12/2021); Insertion of tunneled central venous catheter (CVC) with subcutaneous port (N/A, 11/09/2021); Incision and drainage of abscess (Left, 12/24/2021); Removal of vascular access port; Breast lumpectomy (Left, 2021); Injection of anesthetic agent into sacroiliac joint (Right, 08/22/2022); Injection of anesthetic agent around medial branch nerves innervating lumbar facet joint (Right, 10/19/2022); Injection of anesthetic agent around medial branch nerves innervating lumbar facet joint (Right, 11/09/2022); Breast biopsy (Right, 10/31/2022); Radiofrequency thermocoagulation (Right, 12/07/2022); Epidural steroid injection into cervical spine (N/A, 02/02/2023); Cystocele repair; Robot-assisted laparoscopic abdominal sacrocolpopexy (N/A, 8/10/2023); xi robotic urethropexy (N/A, 8/10/2023); and Robot-assisted laparoscopic oophorectomy (Right, 8/10/2023).    FamHx: family history includes Breast cancer in her maternal grandmother and mother; Cancer (age of onset: 38) in her mother; Cataracts in her cousin; Heart disease in her maternal grandmother; Hypertension in her son.    SocHx:  reports that she has never smoked. She has never been exposed to tobacco smoke. She has never used smokeless tobacco. She reports that she does not drink alcohol and does not use drugs.      Physical Exam:  There were no vitals filed for this visit.  General: A&Ox3, no apparent distress, no deformities  Neck: No masses, normal ROM  Lungs: normal inspiration, no use of accessory muscles  Heart: normal pulse, no arrhythmias  Abdomen: Soft, NT, ND, incision is well healed  Skin: The skin is warm and dry. No jaundice.  Ext: No  c/c/e.    Labs/Studies:   UA small blood, 100 protein 1/25  PNE 5/22/24    Impression/Plan:       1. Urge incontinence-  InterStim  6/11/24    Patient has done well following the InterStim but she now believes that some of her neuropathic pain is secondary to the InterStim, she notes the pain at the lead site but not the device site.  She is adamant that she would like the InterStim removed, understanding that her incontinence will become worse.  Therefore will proceed with InterStim removal, please see below in regards to our discussion today, call with any other issues prior to the next appointment.  Due to significant cardiac history, will need to be cleared prior to surgery.     2. Stress incontinence-  robotic sacrocolpopexy, urethropexy, right oophorectomy OBGYN  8/10/23    No evidence of stress incontinence, nothing further at this juncture.    3. UTI- no evidence of UTI, call with any issues.  Of note no gross hematuria, no history of smoking.    Patient understands the risks, benefits and alternatives of the above-stated procedure.  Patient understands that these can include, but are not limited to damage to the surrounding structures including the subcutaneous tissue and neurological structures.  Risk of leaving a portion of the device behind, possibly within the spinal column which cannot be removed.  Patient understands that her leakage may worsen.  Risk of persistent pain or discomfort, hematoma, wound breakdown, or possible infection of the wound.  Risk of heart attack, stroke, death, DVT and PE.  Patient understanding of all the above has elected to pursue the procedure as stated.

## 2025-01-17 NOTE — PROGRESS NOTES
..Patient has been scheduled for surgery on 2/18/25 to have her interstim removed. The following surgery instructions were given to pt:    1. Date of procedure and location  2. Patient told that our preadmit office would call with surgery arrival time the day before surgery  3. Someone must be with her to drive her home after surgery  4. Do not eat or drink anything after midnight the night before surgery;  This means no coffee, tea, gum, candy or tobacco products  5. Do not take aspirin, ibuprofen or any blood thinners 7 days before surgery--okay to take Tylenol  6. Do not use herbal products for 14 days prior to surgery; this includes vitamins and other herbals  7. Patient will need cardiac clearance.  States she sees Dr. Hunter Lopez and she will call him today.  Fax number provided so clearance can be faxed.  8. Consent for surgery signed and scanned into chart.    Patient verbalized understanding and was told to contact our office if needed.

## 2025-01-17 NOTE — ASSESSMENT & PLAN NOTE
Hg 9.9. Iron levels adequate    Proceed with Retacrit today. F/u 4-6 weeks with repeat labs for consideration of Retacrit if hg <10

## 2025-01-17 NOTE — PROGRESS NOTES
Subjective:       Patient ID: Nadia Damon is a 69 y.o. female.    Chief Complaint: review labs. Consideration of Retacrit    HPI: 69 y.o female with h/o of CVA 2024, heart transplant in  (on anti-rejection medication), CKD V, triple negative breast ca with lymph node involovement. Initiation of chemotherapy 2021 (Taxotere/Cytoxan) with Udenyca 2021. Unfortunately chemotherapy was complicated by pancytopenia and neutropenic fever resulting in hospitalization x 2. Patient decided on no further chemotherapy. S/p radiation completed 2022    10/31/2022 underwent right breast biopsy due to abnormal findings on mammogram with benign pathology. She continues follow up with breast cancer survivorship     Today she is here for lab review and consideration for EPO therapy for anemia of CKD. C/o chronic back pain recently s/p procedure w/o relief. Possibly undergoing second procedure in the coming weeks.   Social History     Socioeconomic History    Marital status: Single    Number of children: 2    Highest education level: 11th grade   Occupational History    Occupation: Retired   Tobacco Use    Smoking status: Never     Passive exposure: Never    Smokeless tobacco: Never   Substance and Sexual Activity    Alcohol use: Never     Alcohol/week: 0.0 standard drinks of alcohol    Drug use: No    Sexual activity: Not Currently     Partners: Male     Birth control/protection: See Surgical Hx   Other Topics Concern    Are you pregnant or think you may be? No    Breast-feeding No   Social History Narrative    Single. 2 children , 1  at 30 yoa  2014 strep throat -  pneumonia and renal complications after not completing course of AB. Other child lives in [place], [place]. Has a cousin locally that could help in an emergency. 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 Caffeine intake =- 1 cola a day. No coffee, + occasional tea, avoids caffeine especially at night. Still drives. She  does not have a Living Will or Advanced directive.      Social Drivers of Health     Financial Resource Strain: Low Risk  (1/7/2025)    Overall Financial Resource Strain (CARDIA)     Difficulty of Paying Living Expenses: Not hard at all   Food Insecurity: No Food Insecurity (1/7/2025)    Hunger Vital Sign     Worried About Running Out of Food in the Last Year: Never true     Ran Out of Food in the Last Year: Never true   Recent Concern: Food Insecurity - Food Insecurity Present (11/13/2024)    Hunger Vital Sign     Worried About Running Out of Food in the Last Year: Never true     Ran Out of Food in the Last Year: Sometimes true   Transportation Needs: No Transportation Needs (1/7/2025)    PRAPARE - Transportation     Lack of Transportation (Medical): No     Lack of Transportation (Non-Medical): No   Physical Activity: Inactive (1/7/2025)    Exercise Vital Sign     Days of Exercise per Week: 0 days     Minutes of Exercise per Session: 0 min   Stress: Stress Concern Present (1/7/2025)    [employer] - Occupational Stress Questionnaire     Feeling of Stress : To some extent   Housing Stability: Low Risk  (1/7/2025)    Housing Stability Vital Sign     Unable to Pay for Housing in the Last Year: No     Homeless in the Last Year: No       Past Medical History:   Diagnosis Date    Abdominal wall hernia     CT Renal 6/11/2018---Small fat containing superior ventral abdominal wall hernia at the epicardial pacing lead site.    Abnormal mammogram 10/12/2021    Acute cystitis without hematuria 05/10/2022    Acute ischemic right middle cerebral artery (MCA) stroke 07/20/2024    Adverse drug reaction 11/12/2024    GRACE (acute kidney injury) 11/22/2021    Anxiety     Aphasia 07/19/2024    Arthritis     ZEN HIPS    Breast cancer in female 08/2021    LEFT BREAST    Breast mass, right 11/13/2024    RIGHT BREAST MASS (Problem)      Bronchitis 08/18/2016    Never smoked      C. difficile colitis 11/29/2021     Cellulitis of axilla, left 12/23/2021    Chronic diastolic heart failure 12/16/2021    Chronic kidney disease     stage 4, GFR 15-29 ml/min    Chronic midline low back pain without sciatica 06/18/2018    CKD (chronic kidney disease) stage 4, GFR 15-29 ml/min 04/20/2016    US Retro   5/16/2018---Mild cortical thinning and increased cortical echogenicity.  Findings can be seen with medical renal disease.  8/31/216--- Echogenic kidneys with diffuse cortical thinning suggesting  medical renal disease. 2 complex right renal cortical cysts.      Closed nondisplaced fracture of distal phalanx of left great toe with routine healing 10/22/2018    Coronary artery disease 1993    heart transplant    COVID-19 in immunocompromised patient 02/26/2024    Cystitis 05/10/2022    Depression     Encounter for blood transfusion     Encounter for palliative care 10/14/2024    Fibromyalgia     on Lyrica    Heart failure     native heart cardiomyopathy    Heart transplanted 1993    due to cardiomyopathy    History of hyperparathyroidism; Hyperparathyroidism, secondary renal     PT DENIES    Hypertension     Immune disorder     anti rejection meds    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    Impaired mobility 07/28/2022    Iron deficiency anemia 08/15/2017    Kidney stones     passed per pt    Obesity     Obesity (BMI 30.0-34.9) 07/22/2019    Other osteoporosis without current pathological fracture 08/30/2019    Other pancytopenia 05/06/2024    Parotitis, acute 09/19/2023    Pharyngitis 01/09/2019    Pneumonia due to infectious organism 03/14/2024    PONV (postoperative nausea and vomiting)     Severe sepsis 11/22/2021    Shingles 2003 approx    left leg    Stage 4 chronic kidney disease 11/22/2021    Subclinical hypothyroidism 06/16/2023    Thrombocytopenia, unspecified 11/29/2021    Trouble in sleeping     Unresponsiveness 11/13/2024    Urinary incontinence        Family History   Problem Relation Name Age of Onset     Cancer Mother  38        breast    Breast cancer Mother      Breast cancer Maternal Grandmother      Heart disease Maternal Grandmother      Hypertension Son      Cataracts Cousin      Diabetes Neg Hx      Stroke Neg Hx      Kidney disease Neg Hx      Asthma Neg Hx      COPD Neg Hx      Melanoma Neg Hx      Hyperlipidemia Neg Hx         Past Surgical History:   Procedure Laterality Date    bladder implant Right 06/11/2024    BLADDER SURGERY  2015 approx    mesh - Dr Everett then 2nd reconstructive sx Dr Onofre    BREAST BIOPSY Bilateral     NEGATIVE    BREAST BIOPSY Right 10/31/2022    benign    BREAST LUMPECTOMY Left 2021    BREAST SURGERY Left 09/28/2015    Bx - benign    BREAST SURGERY Right 12/2015    Bx benign    CARDIAC PACEMAKER REMOVAL Left 06/26/2014    Pacer defirillator removed. Put in 1993 aat time of heart transplant    CARPAL TUNNEL RELEASE Left 03/03/2015    Dr. Hall    COLONOSCOPY N/A 02/25/2021    Procedure: COLONOSCOPY;  Surgeon: Freida Ramirez MD;  Location: [facility] ENDO;  Service: Endoscopy;  Laterality: N/A;    CYSTOCELE REPAIR      Twice with mesh removal    EPIDURAL STEROID INJECTION INTO CERVICAL SPINE N/A 02/02/2023    Procedure: T11/T12 IL HELLEN;  Surgeon: Jassi Pierre MD;  Location: [facility] PAIN MGT;  Service: Pain Management;  Laterality: N/A;    EPIDURAL STEROID INJECTION INTO CERVICAL SPINE N/A 9/16/2024    Procedure: T11/T12 IL HELLEN;  Surgeon: Jassi Pierre MD;  Location: [facility] PAIN MGT;  Service: Pain Management;  Laterality: N/A;    HEART TRANSPLANT  1993    HERNIA REPAIR Right 1971 approx    Inguinal    HYSTERECTOMY  1983    vag hyst /LSO     IMPLANTATION OF PERMANENT SACRAL NERVE STIMULATOR N/A 06/11/2024    Procedure: INSERTION, NEUROSTIMULATOR, PERMANENT, SACRAL;  Surgeon: Sami Cochran MD;  Location: [facility] OR;  Service: Urology;  Laterality: N/A;    INCISION AND DRAINAGE OF ABSCESS Left 12/24/2021    Procedure: INCISION AND DRAINAGE, ABSCESS;  Surgeon: Joseph  MD Qing;  Location: [facility] OR;  Service: General;  Laterality: Left;    INJECTION OF ANESTHETIC AGENT AROUND MEDIAL BRANCH NERVES INNERVATING LUMBAR FACET JOINT Right 10/19/2022    Procedure: Right L4/L5 and L5/S1 MBB;  Surgeon: Jassi Pierre MD;  Location: [facility] PAIN MGT;  Service: Pain Management;  Laterality: Right;    INJECTION OF ANESTHETIC AGENT AROUND MEDIAL BRANCH NERVES INNERVATING LUMBAR FACET JOINT Right 11/09/2022    Procedure: Right L4/L5 and L5/S1 MBB;  Surgeon: Jassi Pierre MD;  [facility];  Service: Pain Management;  Laterality: Right;    INJECTION OF ANESTHETIC AGENT INTO SACROILIAC JOINT Right 08/22/2022    Procedure: Right SIJ Injection Right L5/S1 Facte Injection;  Surgeon: Jassi Pierre MD;  Location: [facility] PAIN MGT;  Service: Pain Management;  Laterality: Right;    INSERTION OF TUNNELED CENTRAL VENOUS CATHETER (CVC) WITH SUBCUTANEOUS PORT N/A 11/09/2021    Procedure: [id number]-[id number];  Surgeon: Christoph Douglas MD;  Location: [facility] OR;  Service: General;  Laterality: N/A;    RADIOFREQUENCY ABLATION Right 12/5/2024    Procedure: Right L4-5 and L5-S1 RFA;  Surgeon: Jassi Pierre MD;  Location: [facility] PAIN MGT;  Service: Pain Management;  Laterality: Right;    RADIOFREQUENCY THERMOCOAGULATION Right 12/07/2022    Procedure: Right L4/L5 and L5/S1 Lumbar RFA;  Surgeon: Jassi Pierre MD;  Location: [facility] PAIN MGT;  Service: Pain Management;  Laterality: Right;    REMOVAL OF VASCULAR ACCESS PORT      ROBOT-ASSISTED LAPAROSCOPIC ABDOMINAL SACROCOLPOPEXY N/A 08/10/2023    Procedure: ROBOTIC SACROCOLPOPEXY, ABDOMEN;  Surgeon: PRANAY Villalobos MD;  Location: [facility] OR;  Service: OB/GYN;  Laterality: N/A;    ROBOT-ASSISTED LAPAROSCOPIC OOPHORECTOMY Right 08/10/2023    Procedure: ROBOTIC OOPHORECTOMY;  Surgeon: PRANAY Villalobos MD;  Location: [facility] OR;  Service: OB/GYN;  Laterality: Right;    SENTINEL LYMPH NODE BIOPSY Left 10/12/2021    Procedure: BIOPSY, LYMPH NODE,  SENTINEL;  Surgeon: Christoph Douglas MD;  Location: [facility] OR;  Service: General;  Laterality: Left;    TOE SURGERY      XI ROBOTIC URETHROPEXY N/A 08/10/2023    Procedure: XI ROBOTIC URETHROPEXY;  Surgeon: PRANAY Villalobos MD;  Location: [facility] OR;  Service: OB/GYN;  Laterality: N/A;       Review of Systems   Constitutional:  Negative for activity change, appetite change, chills, fatigue, fever and unexpected weight change.   HENT:  Negative for congestion, mouth sores, nosebleeds, sore throat, trouble swallowing and voice change.    Eyes:  Negative for visual disturbance.   Respiratory:  Negative for cough, chest tightness, shortness of breath and wheezing.    Cardiovascular:  Negative for chest pain, palpitations and leg swelling.   Gastrointestinal:  Negative for abdominal distention, abdominal pain, anal bleeding, blood in stool, constipation, diarrhea, nausea and vomiting.   Genitourinary:  Negative for difficulty urinating, dysuria and hematuria.   Musculoskeletal:  Positive for back pain (sees pain management). Negative for arthralgias and myalgias. Gait problem: utizlies cane, s/p CVA 7/2024.       Breast pain s/p mammogram   Skin:  Negative for pallor, rash and wound.   Neurological:  Negative for dizziness, syncope, weakness and headaches.   Hematological:  Negative for adenopathy. Does not bruise/bleed easily.   Psychiatric/Behavioral:  The patient is nervous/anxious.          Medication List with Changes/Refills   Current Medications    ASPIRIN (ECOTRIN) 81 MG EC TABLET    Take 1 tablet (81 mg total) by mouth once daily.    CALCITRIOL (ROCALTROL) 0.25 MCG CAP    Take 1 capsule (0.25 mcg total) by mouth once daily.    CARVEDILOL (COREG) 3.125 MG TABLET    Take 1 tablet (3.125 mg total) by mouth 2 (two) times daily with meals. Hold parameters: SBP less than 110 and/or DBP less than 75    CYCLOSPORINE MODIFIED, NEORAL, (NEORAL) 25 MG CAPSULE    TAKE 3 CAPSULES TWICE DAILY    FUROSEMIDE (LASIX) 20 MG TABLET     Take 2 tablets (40 mg total) by mouth once daily. HOLD UNTIL FOLLOW UP WITH PCP    LIDOCAINE (LIDODERM) 5 %    Place 1 patch onto the skin once daily. Remove & Discard patch within 12 hours or as directed by MD    NIFEDIPINE (PROCARDIA-XL) 30 MG (OSM) 24 HR TABLET    Take 30 mg by mouth once daily.    NITROGLYCERIN (NITROSTAT) 0.4 MG SL TABLET    PLACE 1 TABLET UNDER THE TONGUE EVERY 5 MINS AS NEEDED FOR CHEST PAIN FOR UP TO 3 DOSES.IF CONTINUED HEART PAIN/PRESSURE AFTER    ONDANSETRON (ZOFRAN) 4 MG TABLET    Take 4 mg by mouth as needed for Nausea.    PANTOPRAZOLE (PROTONIX) 20 MG TABLET    TAKE 1 TABLET ONE TIME DAILY    POLYETHYLENE GLYCOL (GLYCOLAX) 17 GRAM PWPK    Take 17 g by mouth once daily.    PREGABALIN (LYRICA) 50 MG CAPSULE    Take 1 capsule (50 mg total) by mouth every evening.    SODIUM BICARBONATE 650 MG TABLET    Take 1 tablet (650 mg total) by mouth 2 (two) times daily.    TEMAZEPAM (RESTORIL) 30 MG CAPSULE    Take 1 capsule (30 mg total) by mouth nightly as needed for Insomnia. FOR INSOMNIA    TIZANIDINE (ZANAFLEX) 2 MG TABLET    Take 2 tablets (4 mg total) by mouth 2 (two) times a day.    VITAMIN E 400 UNIT CAPSULE    Take 400 Units by mouth once daily.    ZOLPIDEM (AMBIEN) 5 MG TAB    Take 1 tablet (5 mg total) by mouth every evening.     Objective:     Vitals:    01/17/25 1302   BP: (!) 161/88   Pulse: 83   Temp: 97.5 °F (36.4 °C)     Lab Results   Component Value Date    WBC 3.11 (L) 01/16/2025    HGB 9.9 (L) 01/16/2025    HCT 31.0 (L) 01/16/2025    MCV 91 01/16/2025     01/16/2025       BMP  Lab Results   Component Value Date     01/16/2025    K 5.1 01/16/2025     01/16/2025    CO2 27 01/16/2025    BUN 57 (H) 01/16/2025    CREATININE 4.1 (H) 01/16/2025    CALCIUM 9.3 01/16/2025    ANIONGAP 12 01/16/2025    ESTGFRAFRICA 19 (A) 07/14/2022    EGFRNONAA 17 (A) 07/14/2022     Lab Results   Component Value Date    ALT 30 01/16/2025    AST 48 (H) 01/16/2025    ALKPHOS 100  01/16/2025    BILITOT 0.5 01/16/2025     Lab Results   Component Value Date    IRON 79 12/20/2024    TIBC 345 12/20/2024    FERRITIN 76 12/20/2024       Physical Exam  Vitals reviewed.   Constitutional:       Appearance: She is well-developed.   HENT:      Head: Normocephalic.      Right Ear: External ear normal.      Left Ear: External ear normal.   Eyes:      General: Lids are normal. No scleral icterus.        Right eye: No discharge.         Left eye: No discharge.      Conjunctiva/sclera: Conjunctivae normal.   Neck:      Thyroid: No thyroid mass.   Cardiovascular:      Rate and Rhythm: Normal rate and regular rhythm.      Heart sounds: Normal heart sounds.   Pulmonary:      Effort: Pulmonary effort is normal. No respiratory distress.   Abdominal:      General: Bowel sounds are normal. There is no distension.      Palpations: Abdomen is soft.   Genitourinary:     Comments: deferred  Musculoskeletal:         General: Normal range of motion.      Cervical back: Normal range of motion.      Comments: Mild BLE edema   Skin:     General: Skin is warm and dry.   Neurological:      Mental Status: She is alert and oriented to person, place, and time.   Psychiatric:         Speech: Speech normal.         Behavior: Behavior normal. Behavior is cooperative.         Thought Content: Thought content normal.        Assessment:     Problem List Items Addressed This Visit          Oncology    Anemia associated with stage 5 chronic renal failure - Primary (Chronic)     Hg 9.9. Iron levels adequate    Proceed with Retacrit today. F/u 4-6 weeks with repeat labs for consideration of Retacrit if hg <10              Plan:     Anemia associated with stage 5 chronic renal failure      Route Chart for Scheduling    Med Onc Chart Routing      Follow up with physician    Follow up with LIA 6 weeks. 4-6 weeks   Infusion scheduling note    Injection scheduling note retacrit   Labs CBC, ferritin and iron and TIBC   Scheduling:  Preferred  lab:  Lab interval:     Imaging None      Pharmacy appointment No pharmacy appointment needed      Other referrals       No additional referrals needed           Therapy Plan Information  INF EPOETIN TRISTAN (RETACRIT) INITIAL DOSES for Other osteoporosis without current pathological fracture, noted on 8/30/2019  INF EPOETIN TRISTAN (RETACRIT) INITIAL DOSES for Anemia associated with stage 5 chronic renal failure, noted on 11/19/2024  epoetin tristan-epbx injection 20,000 Units  20,000 Units, Subcutaneous, PRN      No therapy plan of the specified type found.    No therapy plan of the specified type found.          Yudith Mendoza, EVELYNP-C

## 2025-01-17 NOTE — PROGRESS NOTES
Request for surgery clearance, including demographics & clinicals faxed to Dr. Hunter Lopez; Interstim removal surgery is scheduled for 2/28/25.

## 2025-01-17 NOTE — PROGRESS NOTES
Pt has [facility] - if message below not viewed please call w information below:   TSH elevated still with normal freeT4.   Did you stop taking the biotin earlier this month so it wouldn't affect the thyroid tests?  We can discuss further on f/u later this month - e-consult to Endocrine is something to consider.      Please message or call with any questions or concerns!  Thank you!  Elis Wick MD, MPH  [facility]

## 2025-01-19 ENCOUNTER — HOSPITAL ENCOUNTER (EMERGENCY)
Facility: HOSPITAL | Age: 70
Discharge: HOME | End: 2025-01-19
Attending: FAMILY MEDICINE
Payer: MEDICARE

## 2025-01-19 ENCOUNTER — NURSE TRIAGE (OUTPATIENT)
Dept: ADMINISTRATIVE | Facility: CLINIC | Age: 70
End: 2025-01-19
Payer: MEDICARE

## 2025-01-19 VITALS
RESPIRATION RATE: 24 BRPM | SYSTOLIC BLOOD PRESSURE: 152 MMHG | WEIGHT: 160.94 LBS | TEMPERATURE: 98 F | OXYGEN SATURATION: 100 % | BODY MASS INDEX: 29.44 KG/M2 | DIASTOLIC BLOOD PRESSURE: 75 MMHG | HEART RATE: 80 BPM

## 2025-01-19 DIAGNOSIS — R07.9 CHEST PAIN: Primary | ICD-10-CM

## 2025-01-19 LAB
ALBUMIN SERPL BCP-MCNC: 3.4 G/DL (ref 3.5–5.2)
ALP SERPL-CCNC: 96 U/L (ref 40–150)
ALT SERPL W/O P-5'-P-CCNC: 57 U/L (ref 10–44)
ANION GAP SERPL CALC-SCNC: 18 MMOL/L (ref 8–16)
AST SERPL-CCNC: 119 U/L (ref 10–40)
BACTERIA #/AREA URNS HPF: NORMAL /HPF
BASOPHILS # BLD AUTO: 0.02 K/UL (ref 0–0.2)
BASOPHILS NFR BLD: 0.6 % (ref 0–1.9)
BILIRUB SERPL-MCNC: 0.5 MG/DL (ref 0.1–1)
BILIRUB UR QL STRIP: NEGATIVE
BNP SERPL-MCNC: 542 PG/ML (ref 0–99)
BUN SERPL-MCNC: 53 MG/DL (ref 8–23)
CALCIUM SERPL-MCNC: 9.6 MG/DL (ref 8.7–10.5)
CHLORIDE SERPL-SCNC: 105 MMOL/L (ref 95–110)
CLARITY UR: CLEAR
CO2 SERPL-SCNC: 21 MMOL/L (ref 23–29)
COLOR UR: YELLOW
CREAT SERPL-MCNC: 4.2 MG/DL (ref 0.5–1.4)
DIFFERENTIAL METHOD BLD: ABNORMAL
EOSINOPHIL # BLD AUTO: 0.1 K/UL (ref 0–0.5)
EOSINOPHIL NFR BLD: 3.5 % (ref 0–8)
ERYTHROCYTE [DISTWIDTH] IN BLOOD BY AUTOMATED COUNT: 15.5 % (ref 11.5–14.5)
EST. GFR  (NO RACE VARIABLE): 11 ML/MIN/1.73 M^2
GLUCOSE SERPL-MCNC: 77 MG/DL (ref 70–110)
GLUCOSE UR QL STRIP: NEGATIVE
HCT VFR BLD AUTO: 30.1 % (ref 37–48.5)
HGB BLD-MCNC: 9.6 G/DL (ref 12–16)
HGB UR QL STRIP: ABNORMAL
IMM GRANULOCYTES # BLD AUTO: 0.01 K/UL (ref 0–0.04)
IMM GRANULOCYTES NFR BLD AUTO: 0.3 % (ref 0–0.5)
KETONES UR QL STRIP: NEGATIVE
LEUKOCYTE ESTERASE UR QL STRIP: NEGATIVE
LYMPHOCYTES # BLD AUTO: 1.1 K/UL (ref 1–4.8)
LYMPHOCYTES NFR BLD: 31.7 % (ref 18–48)
MCH RBC QN AUTO: 29.4 PG (ref 27–31)
MCHC RBC AUTO-ENTMCNC: 31.9 G/DL (ref 32–36)
MCV RBC AUTO: 92 FL (ref 82–98)
MICROSCOPIC COMMENT: NORMAL
MONOCYTES # BLD AUTO: 0.6 K/UL (ref 0.3–1)
MONOCYTES NFR BLD: 16.1 % (ref 4–15)
NEUTROPHILS # BLD AUTO: 1.6 K/UL (ref 1.8–7.7)
NEUTROPHILS NFR BLD: 47.8 % (ref 38–73)
NITRITE UR QL STRIP: NEGATIVE
NRBC BLD-RTO: 0 /100 WBC
PH UR STRIP: 8 [PH] (ref 5–8)
PLATELET # BLD AUTO: 187 K/UL (ref 150–450)
PMV BLD AUTO: 10 FL (ref 9.2–12.9)
POTASSIUM SERPL-SCNC: 3.2 MMOL/L (ref 3.5–5.1)
PROT SERPL-MCNC: 7.8 G/DL (ref 6–8.4)
PROT UR QL STRIP: ABNORMAL
RBC # BLD AUTO: 3.27 M/UL (ref 4–5.4)
RBC #/AREA URNS HPF: 2 /HPF (ref 0–4)
SODIUM SERPL-SCNC: 144 MMOL/L (ref 136–145)
SP GR UR STRIP: 1.02 (ref 1–1.03)
SQUAMOUS #/AREA URNS HPF: 2 /HPF
TROPONIN I SERPL DL<=0.01 NG/ML-MCNC: 0.03 NG/ML (ref 0–0.03)
URN SPEC COLLECT METH UR: ABNORMAL
UROBILINOGEN UR STRIP-ACNC: NEGATIVE EU/DL
WBC # BLD AUTO: 3.41 K/UL (ref 3.9–12.7)
WBC #/AREA URNS HPF: 2 /HPF (ref 0–5)

## 2025-01-19 PROCEDURE — 83880 ASSAY OF NATRIURETIC PEPTIDE: CPT | Mod: HCNC | Performed by: FAMILY MEDICINE

## 2025-01-19 PROCEDURE — 99283 EMERGENCY DEPT VISIT LOW MDM: CPT | Mod: 25,HCNC

## 2025-01-19 PROCEDURE — 80053 COMPREHEN METABOLIC PANEL: CPT | Mod: HCNC | Performed by: FAMILY MEDICINE

## 2025-01-19 PROCEDURE — 81000 URINALYSIS NONAUTO W/SCOPE: CPT | Mod: HCNC | Performed by: FAMILY MEDICINE

## 2025-01-19 PROCEDURE — 25000003 PHARM REV CODE 250: Mod: HCNC | Performed by: FAMILY MEDICINE

## 2025-01-19 PROCEDURE — 93010 ELECTROCARDIOGRAM REPORT: CPT | Mod: HCNC,,, | Performed by: INTERNAL MEDICINE

## 2025-01-19 PROCEDURE — 85025 COMPLETE CBC W/AUTO DIFF WBC: CPT | Mod: HCNC | Performed by: FAMILY MEDICINE

## 2025-01-19 PROCEDURE — 84484 ASSAY OF TROPONIN QUANT: CPT | Mod: HCNC | Performed by: FAMILY MEDICINE

## 2025-01-19 PROCEDURE — 93005 ELECTROCARDIOGRAM TRACING: CPT | Mod: HCNC

## 2025-01-19 RX ORDER — LIDOCAINE HYDROCHLORIDE 20 MG/ML
15 SOLUTION OROPHARYNGEAL ONCE
Status: COMPLETED | OUTPATIENT
Start: 2025-01-19 | End: 2025-01-19

## 2025-01-19 RX ORDER — TIZANIDINE 4 MG/1
4 TABLET ORAL ONCE
Status: COMPLETED | OUTPATIENT
Start: 2025-01-19 | End: 2025-01-19

## 2025-01-19 RX ORDER — ALUMINUM HYDROXIDE, MAGNESIUM HYDROXIDE, AND SIMETHICONE 1200; 120; 1200 MG/30ML; MG/30ML; MG/30ML
30 SUSPENSION ORAL ONCE
Status: COMPLETED | OUTPATIENT
Start: 2025-01-19 | End: 2025-01-19

## 2025-01-19 RX ADMIN — TIZANIDINE 4 MG: 4 TABLET ORAL at 06:01

## 2025-01-19 RX ADMIN — LIDOCAINE HYDROCHLORIDE 15 ML: 20 SOLUTION ORAL at 06:01

## 2025-01-19 RX ADMIN — ALUMINUM HYDROXIDE, MAGNESIUM HYDROXIDE, AND DIMETHICONE 30 ML: 200; 20; 200 SUSPENSION ORAL at 06:01

## 2025-01-19 NOTE — ED NOTES
Pt. Resting quietly on stretcher in NAD. VSS, resp. E/u. AAOx4. All results back - awaiting disposition. Friend at [facility]. [facility].

## 2025-01-19 NOTE — ED PROVIDER NOTES
SCRIBE #1 NOTE: I, Jerod Vásquez, am scribing for, and in the presence of, Emmy Storm MD. I have scribed the entire note.       History     Chief Complaint   Patient presents with    Chest Pain     Pt reports CP described as a pressure radiating to her back,  starting a 04:00 am. Hx heart transplant. Pain rating 8/10. Total meds 1 325 ASA, SL NTG x 3.      Review of patient's allergies indicates:   Allergen Reactions    Dobutamine Other (See Comments)     Severe Left sided chest pain after dosage administered for Dobutamine Stress Test; itching    Lisinopril Swelling and Rash    Nitro Shortness Of Breath     Audible wheezing - after Nitrolgycerin Spray administered x 2; itching    Hydrocodone-acetaminophen Nausea Only    Augmentin [amoxicillin-pot clavulanate] Diarrhea    Zyvox [linezolid] Nausea And Vomiting         History of Present Illness     HPI    1/19/2025, 4:07 PM  History obtained from the patient      History of Present Illness: Nadia Damon is a 69 y.o. female patient with a PMHx of HTN, heart transplant, depression, anxiety, obesity,  heart failure, Stage 4 CKD, and CAD who presents to the Emergency Department for evaluation of mid-sternal CP which onset gradually since 4 AM this morning. Pt rates the pain as a 8/10 currently. Pt describe it as a pressure radiating to her back. Pt notes she has an extensive cardiac hx, including a heart transplant. Symptoms are constant and moderate in severity. No mitigating or exacerbating factors reported. Associated sxs include nausea, SOB, and leg swelling. Pt states she has the leg swelling chronically. Patient denies any vomiting, weakness, and all other sxs at this time. Prior Tx includes nitroglycerin without any relief. No further complaints or concerns at this time.       Arrival mode: Ambulance Service     PCP: Elis Wick MD        Past Medical History:  Past Medical History:   Diagnosis Date    Abdominal wall hernia     CT Renal  6/11/2018---Small fat containing superior ventral abdominal wall hernia at the epicardial pacing lead site.    Abnormal mammogram 10/12/2021    Acute cystitis without hematuria 05/10/2022    Acute ischemic right middle cerebral artery (MCA) stroke 07/20/2024    Adverse drug reaction 11/12/2024    GRACE (acute kidney injury) 11/22/2021    Anxiety     Aphasia 07/19/2024    Arthritis     ZEN HIPS    Breast cancer in female 08/2021    LEFT BREAST    Breast mass, right 11/13/2024    RIGHT BREAST MASS (Problem)      Bronchitis 08/18/2016    Never smoked      C. difficile colitis 11/29/2021    Cellulitis of axilla, left 12/23/2021    Chronic diastolic heart failure 12/16/2021    Chronic kidney disease     stage 4, GFR 15-29 ml/min    Chronic midline low back pain without sciatica 06/18/2018    CKD (chronic kidney disease) stage 4, GFR 15-29 ml/min 04/20/2016    US Retro   5/16/2018---Mild cortical thinning and increased cortical echogenicity.  Findings can be seen with medical renal disease.  8/31/216--- Echogenic kidneys with diffuse cortical thinning suggesting  medical renal disease. 2 complex right renal cortical cysts.      Closed nondisplaced fracture of distal phalanx of left great toe with routine healing 10/22/2018    Coronary artery disease 1993    heart transplant    COVID-19 in immunocompromised patient 02/26/2024    Cystitis 05/10/2022    Depression     Encounter for blood transfusion     Encounter for palliative care 10/14/2024    Fibromyalgia     on Lyrica    Heart failure     native heart cardiomyopathy    Heart transplanted 1993    due to cardiomyopathy    History of hyperparathyroidism; Hyperparathyroidism, secondary renal     PT DENIES    Hypertension     Immune disorder     anti rejection meds    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    Impaired mobility 07/28/2022    Iron deficiency anemia 08/15/2017    Kidney stones     passed per pt    Obesity     Obesity (BMI 30.0-34.9) 07/22/2019     Other osteoporosis without current pathological fracture 08/30/2019    Other pancytopenia 05/06/2024    Parotitis, acute 09/19/2023    Pharyngitis 01/09/2019    Pneumonia due to infectious organism 03/14/2024    PONV (postoperative nausea and vomiting)     Severe sepsis 11/22/2021    Shingles 2003 approx    left leg    Stage 4 chronic kidney disease 11/22/2021    Subclinical hypothyroidism 06/16/2023    Thrombocytopenia, unspecified 11/29/2021    Trouble in sleeping     Unresponsiveness 11/13/2024    Urinary incontinence        Past Surgical History:  Past Surgical History:   Procedure Laterality Date    bladder implant Right 06/11/2024    BLADDER SURGERY  2015 approx    mesh - Dr Everett then 2nd reconstructive sx Dr Onofre    BREAST BIOPSY Bilateral     NEGATIVE    BREAST BIOPSY Right 10/31/2022    benign    BREAST LUMPECTOMY Left 2021    BREAST SURGERY Left 09/28/2015    Bx - benign    BREAST SURGERY Right 12/2015    Bx benign    CARDIAC PACEMAKER REMOVAL Left 06/26/2014    Pacer defirillator removed. Put in 1993 aat time of heart transplant    CARPAL TUNNEL RELEASE Left 03/03/2015    Dr. Hall    COLONOSCOPY N/A 02/25/2021    Procedure: COLONOSCOPY;  Surgeon: Freida Ramirez MD;  Location: [facility];  Service: Endoscopy;  Laterality: N/A;    CYSTOCELE REPAIR      Twice with mesh removal    EPIDURAL STEROID INJECTION INTO CERVICAL SPINE N/A 02/02/2023    Procedure: T11/T12 IL HELLEN;  Surgeon: Jassi Pierre MD;  Location: [facility] MGT;  Service: Pain Management;  Laterality: N/A;    EPIDURAL STEROID INJECTION INTO CERVICAL SPINE N/A 9/16/2024    Procedure: T11/T12 IL HELLEN;  Surgeon: Jassi Pierre MD;  Location: [facility] PAIN MGT;  Service: Pain Management;  Laterality: N/A;    HEART TRANSPLANT  1993    HERNIA REPAIR Right 1971 approx    Inguinal    HYSTERECTOMY  1983    vag hyst /LSO     IMPLANTATION OF PERMANENT SACRAL NERVE STIMULATOR N/A 06/11/2024    Procedure: INSERTION, NEUROSTIMULATOR, PERMANENT,  SACRAL;  Surgeon: Sami Cochran MD;  Location: [facility] OR;  Service: Urology;  Laterality: N/A;    INCISION AND DRAINAGE OF ABSCESS Left 12/24/2021    Procedure: INCISION AND DRAINAGE, ABSCESS;  Surgeon: Joseph Longo MD;  Location: [facility] OR;  Service: General;  Laterality: Left;    INJECTION OF ANESTHETIC AGENT AROUND MEDIAL BRANCH NERVES INNERVATING LUMBAR FACET JOINT Right 10/19/2022    Procedure: Right L4/L5 and L5/S1 MBB;  Surgeon: Jassi Pierre MD;  Location: [facility] PAIN MGT;  Service: Pain Management;  Laterality: Right;    INJECTION OF ANESTHETIC AGENT AROUND MEDIAL BRANCH NERVES INNERVATING LUMBAR FACET JOINT Right 11/09/2022    Procedure: Right L4/L5 and L5/S1 MBB;  Surgeon: Jassi Pierre MD;  Location: [facility] PAIN MGT;  Service: Pain Management;  Laterality: Right;    INJECTION OF ANESTHETIC AGENT INTO SACROILIAC JOINT Right 08/22/2022    Procedure: Right SIJ Injection Right L5/S1 Facte Injection;  Surgeon: Jassi Pierre MD;  Location: [facility] PAIN MGT;  Service: Pain Management;  Laterality: Right;    INSERTION OF TUNNELED CENTRAL VENOUS CATHETER (CVC) WITH SUBCUTANEOUS PORT N/A 11/09/2021    Procedure: [id number];  Surgeon: Christoph Douglas MD;  Location: [facility] OR;  Service: General;  Laterality: N/A;    RADIOFREQUENCY ABLATION Right 12/5/2024    Procedure: Right L4-5 and L5-S1 RFA;  Surgeon: Jassi Pierre MD;  Location: [facility] PAIN MGT;  Service: Pain Management;  Laterality: Right;    RADIOFREQUENCY THERMOCOAGULATION Right 12/07/2022    Procedure: Right L4/L5 and L5/S1 Lumbar RFA;  Surgeon: Jassi Pierre MD;  Location: [facility] PAIN MGT;  Service: Pain Management;  Laterality: Right;    REMOVAL OF VASCULAR ACCESS PORT      ROBOT-ASSISTED LAPAROSCOPIC ABDOMINAL SACROCOLPOPEXY N/A 08/10/2023    Procedure: ROBOTIC SACROCOLPOPEXY, ABDOMEN;  Surgeon: PRANAY Villalobos MD;  Location: [facility] OR;  Service: OB/GYN;  Laterality: N/A;    ROBOT-ASSISTED LAPAROSCOPIC OOPHORECTOMY Right  08/10/2023    Procedure: ROBOTIC OOPHORECTOMY;  Surgeon: PRANAY Villalobos MD;  Location: [facility] OR;  Service: OB/GYN;  Laterality: Right;    SENTINEL LYMPH NODE BIOPSY Left 10/12/2021    Procedure: BIOPSY, LYMPH NODE, SENTINEL;  Surgeon: Christoph Douglas MD;  Location: [facility] OR;  Service: General;  Laterality: Left;    TOE SURGERY      XI ROBOTIC URETHROPEXY N/A 08/10/2023    Procedure: XI ROBOTIC URETHROPEXY;  Surgeon: PRANAY Villalobos MD;  Location: [facility] OR;  Service: OB/GYN;  Laterality: N/A;         Family History:  Family History   Problem Relation Name Age of Onset    Cancer Mother  38        breast    Breast cancer Mother      Breast cancer Maternal Grandmother      Heart disease Maternal Grandmother      Hypertension Son      Cataracts Cousin      Diabetes Neg Hx      Stroke Neg Hx      Kidney disease Neg Hx      Asthma Neg Hx      COPD Neg Hx      Melanoma Neg Hx      Hyperlipidemia Neg Hx         Social History:  Social History     Tobacco Use    Smoking status: Never     Passive exposure: Never    Smokeless tobacco: Never   Substance and Sexual Activity    Alcohol use: Never     Alcohol/week: 0.0 standard drinks of alcohol    Drug use: No    Sexual activity: Not Currently     Partners: Male     Birth control/protection: See Surgical Hx        Review of Systems     Review of Systems   Constitutional:  Negative for fever.   HENT:  Negative for sore throat.    Respiratory:  Positive for shortness of breath.    Cardiovascular:  Positive for chest pain (mid sternal) and leg swelling.   Gastrointestinal:  Positive for nausea. Negative for vomiting.   Genitourinary:  Negative for dysuria.   Musculoskeletal:  Positive for back pain (from chest pain).   Skin:  Negative for rash.   Neurological:  Negative for weakness.   Hematological:  Does not bruise/bleed easily.   All other systems reviewed and are negative.     Physical Exam     Initial Vitals [01/19/25 1604]   BP Pulse Resp Temp SpO2   (!) 143/55 77 (!) 24  97.7 °F (36.5 °C) 99 %      MAP       --          Physical Exam  Nursing Notes and Vital Signs Reviewed.  Constitutional: Patient is in no acute distress. Well-developed and well-nourished.  Head: Atraumatic. Normocephalic.  Eyes: PERRL. EOM intact. Conjunctivae are not pale. No scleral icterus.  ENT: Mucous membranes are moist. Oropharynx is clear and symmetric.    Neck: Supple. Full ROM. No lymphadenopathy.  Cardiovascular: Regular rate. Regular rhythm. No murmurs, rubs, or gallops. Distal pulses are 2+ and symmetric.  Pulmonary/Chest: No respiratory distress. Clear to auscultation bilaterally. No wheezing or rales.  Abdominal: Soft and non-distended.  There is no tenderness.  No rebound, guarding, or rigidity. Good bowel sounds.  Genitourinary: No CVA tenderness  Musculoskeletal: Moves all extremities. No obvious deformities. 2+ edema to BLE. No calf tenderness.  Skin: Warm and dry.  Neurological:  Alert, awake, and appropriate.  Normal speech.  No acute focal neurological deficits are appreciated.  Psychiatric: Anxious. Good eye contact. Appropriate in content.     ED Course   Critical Care    Date/Time: 1/19/2025 6:52 PM    Performed by: Emmy Storm MD  Authorized by: Emmy Storm MD  Direct patient critical care time: 14 minutes  Additional history critical care time: 8 minutes  Ordering / reviewing critical care time: 7 minutes  Documentation critical care time: 6 minutes  Total critical care time (exclusive of procedural time) : 35 minutes  Critical care time was exclusive of separately billable procedures and treating other patients and teaching time.  Critical care was necessary to treat or prevent imminent or life-threatening deterioration of the following conditions: chest pain.  Critical care was time spent personally by me on the following activities: blood draw for specimens, development of treatment plan with patient or surrogate, interpretation of cardiac output measurements,  evaluation of patient's response to treatment, examination of patient, obtaining history from patient or surrogate, ordering and performing treatments and interventions, ordering and review of laboratory studies, ordering and review of radiographic studies, pulse oximetry, re-evaluation of patient's condition and review of old charts.        ED Vital Signs:  Vitals:    01/19/25 1604 01/19/25 1607 01/19/25 1630 01/19/25 1642   BP: (!) 143/55 (!) 150/86 (!) 155/87 (!) 160/99   Pulse: 77 91 85 87   Resp: (!) 24 (!) 24 (!) 24    Temp: 97.7 °F (36.5 °C)      TempSrc: Oral      SpO2: 99% 100% 100% 100%   Weight:  73 kg (160 lb 15 oz)      01/19/25 1652 01/19/25 1702 01/19/25 1708 01/19/25 1712   BP: (!) 173/83 (!) 170/79 (!) 160/88 (!) 170/84   Pulse: 82 86 84 81   Resp: (!) 28   20   Temp:       TempSrc:       SpO2: 99% 97% 100% 100%   Weight:        01/19/25 1717 01/19/25 1722 01/19/25 1727 01/19/25 1732   BP: (!) 159/85 (!) 144/81 (!) 147/84 (!) 158/91   Pulse: 83 83 83 85   Resp:       Temp:       TempSrc:       SpO2: 100% 100% 100% 100%   Weight:        01/19/25 1737 01/19/25 1842 01/19/25 1856   BP: (!) 156/98 (!) 141/102 (!) 152/75   Pulse: 86 99 80   Resp:  (!) 22 (!) 24   Temp:      TempSrc:      SpO2: 100% 100% 100%   Weight:          Abnormal Lab Results:  Labs Reviewed   CBC W/ AUTO DIFFERENTIAL - Abnormal       Result Value    WBC 3.41 (*)     RBC 3.27 (*)     Hemoglobin 9.6 (*)     Hematocrit 30.1 (*)     MCV 92      MCH 29.4      MCHC 31.9 (*)     RDW 15.5 (*)     Platelets 187      MPV 10.0      Immature Granulocytes 0.3      Gran # (ANC) 1.6 (*)     Immature Grans (Abs) 0.01      Lymph # 1.1      Mono # 0.6      Eos # 0.1      Baso # 0.02      nRBC 0      Gran % 47.8      Lymph % 31.7      Mono % 16.1 (*)     Eosinophil % 3.5      Basophil % 0.6      Differential Method Automated     COMPREHENSIVE METABOLIC PANEL - Abnormal    Sodium 144      Potassium 3.2 (*)     Chloride 105      CO2 21 (*)     Glucose  77      BUN 53 (*)     Creatinine 4.2 (*)     Calcium 9.6      Total Protein 7.8      Albumin 3.4 (*)     Total Bilirubin 0.5      Alkaline Phosphatase 96       (*)     ALT 57 (*)     eGFR 11 (*)     Anion Gap 18 (*)    URINALYSIS - Abnormal    Specimen UA Urine, Clean Catch      Color, UA Yellow      Appearance, UA Clear      pH, UA 8.0      Specific Gravity, UA 1.020      Protein, UA Trace (*)     Glucose, UA Negative      Ketones, UA Negative      Bilirubin (UA) Negative      Occult Blood UA 1+ (*)     Nitrite, UA Negative      Urobilinogen, UA Negative      Leukocytes, UA Negative     TROPONIN I - Abnormal    Troponin I 0.030 (*)    B-TYPE NATRIURETIC PEPTIDE - Abnormal     (*)    URINALYSIS MICROSCOPIC    RBC, UA 2      WBC, UA 2      Bacteria Rare      Squam Epithel, UA 2      Microscopic Comment SEE COMMENT          All Lab Results:  Results for orders placed or performed during the hospital encounter of 01/19/25   EKG 12-lead (Chest Pain) Age >30    Collection Time: 01/19/25  3:57 PM   Result Value Ref Range    QRS Duration 168 ms    [facility] QTC Calculation 575 ms   CBC auto differential    Collection Time: 01/19/25  4:25 PM   Result Value Ref Range    WBC 3.41 (L) 3.90 - 12.70 K/uL    RBC 3.27 (L) 4.00 - 5.40 M/uL    Hemoglobin 9.6 (L) 12.0 - 16.0 g/dL    Hematocrit 30.1 (L) 37.0 - 48.5 %    MCV 92 82 - 98 fL    MCH 29.4 27.0 - 31.0 pg    MCHC 31.9 (L) 32.0 - 36.0 g/dL    RDW 15.5 (H) 11.5 - 14.5 %    Platelets 187 150 - 450 K/uL    MPV 10.0 9.2 - 12.9 fL    Immature Granulocytes 0.3 0.0 - 0.5 %    Gran # (ANC) 1.6 (L) 1.8 - 7.7 K/uL    Immature Grans (Abs) 0.01 0.00 - 0.04 K/uL    Lymph # 1.1 1.0 - 4.8 K/uL    Mono # 0.6 0.3 - 1.0 K/uL    Eos # 0.1 0.0 - 0.5 K/uL    Baso # 0.02 0.00 - 0.20 K/uL    nRBC 0 0 /100 WBC    Gran % 47.8 38.0 - 73.0 %    Lymph % 31.7 18.0 - 48.0 %    Mono % 16.1 (H) 4.0 - 15.0 %    Eosinophil % 3.5 0.0 - 8.0 %    Basophil % 0.6 0.0 - 1.9 %    Differential Method  Automated    Comprehensive metabolic panel    Collection Time: 01/19/25  4:25 PM   Result Value Ref Range    Sodium 144 136 - 145 mmol/L    Potassium 3.2 (L) 3.5 - 5.1 mmol/L    Chloride 105 95 - 110 mmol/L    CO2 21 (L) 23 - 29 mmol/L    Glucose 77 70 - 110 mg/dL    BUN 53 (H) 8 - 23 mg/dL    Creatinine 4.2 (H) 0.5 - 1.4 mg/dL    Calcium 9.6 8.7 - 10.5 mg/dL    Total Protein 7.8 6.0 - 8.4 g/dL    Albumin 3.4 (L) 3.5 - 5.2 g/dL    Total Bilirubin 0.5 0.1 - 1.0 mg/dL    Alkaline Phosphatase 96 40 - 150 U/L     (H) 10 - 40 U/L    ALT 57 (H) 10 - 44 U/L    eGFR 11 (A) >60 mL/min/1.73 m^2    Anion Gap 18 (H) 8 - 16 mmol/L   Troponin I    Collection Time: 01/19/25  4:25 PM   Result Value Ref Range    Troponin I 0.030 (H) 0.000 - 0.026 ng/mL   B-Type natriuretic peptide (BNP)    Collection Time: 01/19/25  4:25 PM   Result Value Ref Range     (H) 0 - 99 pg/mL   Urinalysis - Clean Catch    Collection Time: 01/19/25  4:29 PM   Result Value Ref Range    Specimen UA Urine, Clean Catch     Color, UA Yellow Yellow, Straw, Antoineta    Appearance, UA Clear Clear    pH, UA 8.0 5.0 - 8.0    Specific Gravity, UA 1.020 1.005 - 1.030    Protein, UA Trace (A) Negative    Glucose, UA Negative Negative    Ketones, UA Negative Negative    Bilirubin (UA) Negative Negative    Occult Blood UA 1+ (A) Negative    Nitrite, UA Negative Negative    Urobilinogen, UA Negative <2.0 EU/dL    Leukocytes, UA Negative Negative   Urinalysis Microscopic    Collection Time: 01/19/25  4:29 PM   Result Value Ref Range    RBC, UA 2 0 - 4 /hpf    WBC, UA 2 0 - 5 /hpf    Bacteria Rare None-Occ /hpf    Squam Epithel, UA 2 /hpf    Microscopic Comment SEE COMMENT      *Note: Due to a large number of results and/or encounters for the requested time period, some results have not been displayed. A complete set of results can be found in Results Review.         Imaging Results:  Imaging Results              X-Ray Chest AP Portable (Final result)  Result time  01/19/25 16:44:48      Final result by Lan Alexandra MD (01/19/25 16:44:48)                   Impression:      1.  Negative for acute process involving the chest.    2.  Stable findings as noted above.      Electronically signed by: Lan Alexandra MD  Date:    01/19/2025  Time:    16:44               Narrative:    EXAMINATION:  XR CHEST AP PORTABLE    CLINICAL HISTORY:  Chest Pain;    COMPARISON:  November 13, 2024, as well as studies dating back to January 4, 2023    FINDINGS:  EKG leads overlie the chest.  Stable elevation of the right hemidiaphragm.  Stable scarring in the left mid lung.  The lungs are free of new pulmonary opacities.  The cardiac silhouette size is enlarged.  The trachea is midline and the mediastinal width is normal. Negative for focal infiltrate, effusion or pneumothorax. Pulmonary vasculature is normal. Negative for osseous abnormalities. Median sternotomy wires and CABG changes.  Tortuous aorta with calcifications of the aortic knob.  There are degenerative changes of the spine and both shoulder girdles.  Mild convex right curvature of the upper thoracic spine again seen.                                       The EKG was ordered, reviewed, and independently interpreted by the ED provider.  Interpretation time: 15:57  Rate: 95 BPM  Rhythm: Sinus rhythm with occasional AV dual-paced complexes and Premature supraventricular complexes  Interpretation: Possible Left atrial enlargement. Right bundle branch block. Left anterior fascicular block. Bifascicular block. T wave abnormality, consider lateral ischemia. No STEMI.           The Emergency Provider reviewed the vital signs and test results, which are outlined above.     ED Discussion     6:52 PM: Reassessed pt at this time. Discussed with pt all pertinent ED information and results. Discussed pt dx and plan of tx. Gave pt all f/u and return to the ED instructions. All questions and concerns were addressed at this time. Pt expresses  understanding of information and instructions, and is comfortable with plan to discharge. Pt is stable for discharge.    I discussed with patient and/or family/caretaker that evaluation in the ED does not suggest any emergent or life threatening medical conditions requiring immediate intervention beyond what was provided in the ED, and I believe patient is safe for discharge.  Regardless, an unremarkable evaluation in the ED does not preclude the development or presence of a serious of life threatening condition. As such, patient was instructed to return immediately for any worsening or change in current symptoms.         Medical Decision Making  68 yo female c/o chest pain starting this afternoon in center of chest with no radiation.  She is s/p heart transplant in 1993.  She took NTG at home with no improvement.  EKG RBBB.  CXR negative.  Troponin negative.    Amount and/or Complexity of Data Reviewed  Independent Historian: EMS  External Data Reviewed: labs, radiology, ECG and notes.  Labs: ordered. Decision-making details documented in ED Course.  Radiology: ordered and independent interpretation performed. Decision-making details documented in ED Course.  ECG/medicine tests: ordered and independent interpretation performed. Decision-making details documented in ED Course.    Risk  OTC drugs.  Prescription drug management.  Decision regarding hospitalization.    Critical Care  Total time providing critical care: 35 minutes                ED Medication(s):  Medications   aluminum-magnesium hydroxide-simethicone 200-200-20 mg/5 mL suspension 30 mL (30 mLs Oral Given 1/19/25 1807)     And   LIDOcaine viscous HCl 2% oral solution 15 mL (15 mLs Oral Given 1/19/25 1807)   tiZANidine tablet 4 mg (4 mg Oral Given 1/19/25 1834)       Discharge Medication List as of 1/19/2025  6:46 PM           Follow-up Information       Schedule an appointment as soon as possible for a visit  with Elis Wick MD.    Specialty:  Internal Medicine  Why: As needed  Contact information:  [address] [place] [id number]  [phone]                                 Scribe Attestation:   Scribe #1: I performed the above scribed service and the documentation accurately describes the services I performed. I attest to the accuracy of the note.     Attending:   Physician Attestation Statement for Scribe #1: I, Emmy Storm MD, personally performed the services described in this documentation, as scribed by Jerod Vásquez, in my presence, and it is both accurate and complete.           Clinical Impression       ICD-10-CM ICD-9-CM   1. Chest pain  R07.9 786.50       Disposition:   Disposition: Discharged  Condition: Stable       Emmy Storm MD  01/25/25 0704

## 2025-01-19 NOTE — TELEPHONE ENCOUNTER
Spoke with pt who is Heart transplant pt.( 5/27/1994) She reports that she is having SOB with activity, and chest pain. State that she has taken 2 nitroglycerin tablets  Reports that she also has swelling to bilateral legs, and kidney disease. Pt advised to call 911. Pt states that she does not want to call 911. Reiterated need to call 911 at this time. Pt verbalized understanding, and states will call.     Reason for Disposition   [1] Chest pain lasts > 5 minutes AND [2] age > 44    Additional Information   Negative: SEVERE difficulty breathing (e.g., struggling for each breath, speaks in single words)   Negative: Difficult to awaken or acting confused (e.g., disoriented, slurred speech)   Negative: Shock suspected (e.g., cold/pale/clammy skin, too weak to stand, low BP, rapid pulse)   Negative: Passed out (e.g., fainted, lost consciousness, blacked out and was not responding)    Protocols used: Chest Pain-A-AH

## 2025-01-20 ENCOUNTER — TELEPHONE (OUTPATIENT)
Dept: PRIMARY CARE CLINIC | Facility: CLINIC | Age: 70
End: 2025-01-20
Payer: MEDICARE

## 2025-01-20 LAB
OHS QRS DURATION: 168 MS
OHS QTC CALCULATION: 575 MS

## 2025-01-20 NOTE — ED NOTES
Pt. Helped back into bed. Warm pack of wipes (wrapped with blanket) placed on pt's lower back for heat therapy at pt's request. Blankets placed behind pt's lower back for support per pt. Request. Warm blankets placed on pt. Denies further needs. WCTM.

## 2025-01-20 NOTE — ED NOTES
Spoke with Tracey in pharmacy to request tizanidine verification. Tracey states med will be ready for pickup from pharmacy in 5min.

## 2025-01-20 NOTE — ED NOTES
Pt. Pacing in room c/o muscle spasms to lower back. States that these occur frequently at home and that she takes tizanidine for them. Dr. Storm made aware and entering new orders. WCTM.

## 2025-01-20 NOTE — ED NOTES
DC indicated per MD. DC instructions explained to pt., who verbalized understanding. NAD, VSS, resp. E/u. AAOx4. Escorted via WC out of ED without incident. Friend accompanying pt. And verbalizes intent to drive pt. Home. Copy of DC instructions provided to registration team member to give to patient with checkout. Pt. At registration desk for checkout.

## 2025-01-22 NOTE — TELEPHONE ENCOUNTER
----- Message from Delgado sent at 1/21/2025  2:50 PM CST -----  Contact: ariana/tiburcio  Type:  RX Refill Request    Who Called: geneva  Refill or New Rx:new rx  RX Name and Strength:calcitRIOL (ROCALTROL) 0.25 MCG Cap   How is the patient currently taking it? (ex. 1XDay):  Is this a 30 day or 90 day RX:  Preferred Pharmacy with phone number:      [facility] [address] [facility]  [address] [facility] [address]  Phone: [phone] Fax: [phone]       Local or Mail Order:mail  Ordering Provider:chato  Would the patient rather a call back or a response via [facility]? call  Best Call Back Number:  Additional Information:

## 2025-01-22 NOTE — TELEPHONE ENCOUNTER
Please refill calcitriol to [facility] pharmacy when possible.

## 2025-01-24 ENCOUNTER — TELEPHONE (OUTPATIENT)
Dept: PRIMARY CARE CLINIC | Facility: CLINIC | Age: 70
End: 2025-01-24
Payer: MEDICARE

## 2025-01-24 RX ORDER — CALCITRIOL 0.25 UG/1
0.25 CAPSULE ORAL DAILY
Qty: 30 CAPSULE | Refills: 11 | Status: SHIPPED | OUTPATIENT
Start: 2025-01-24

## 2025-01-28 NOTE — PROGRESS NOTES
Returned pt's phone call.   Gets better relief of back pain with higher doses of methocarbamol and pregabalin. Planning to have procedure on back in February.   Having Interstim removal? Needs clearance from Dr Lopez prior to Interstim removal.

## 2025-01-30 NOTE — PRE-PROCEDURE INSTRUCTIONS
Spoke with patient regarding procedure scheduled on 2.6     Arrival time 1245     Has patient been sick with fever or on antibiotics within the last 7 days? No     Does the patient have any open wounds, sores or rashes? No     Does the patient have any recent fractures? no     Has patient received a vaccination within the last 7 days? No     Received the COVID vaccination? yes     Has the patient stopped all medications as directed? na     Does patient have a pacemaker, defibrillator, or implantable stimulator? No     Does the patient have a ride to and from procedure and someone reliable to remain with patient?  harpal      Is the patient diabetic? no      Does the patient have sleep apnea? Or use O2 at home? no     Is the patient receiving sedation? local      Is the patient instructed to remain NPO beginning at midnight the night before their procedure? yes     Procedure location confirmed with patient? Yes     Covid- Denies signs/symptoms. Instructed to notify PAT/MD if any changes.

## 2025-01-31 ENCOUNTER — OFFICE VISIT (OUTPATIENT)
Dept: PRIMARY CARE CLINIC | Facility: CLINIC | Age: 70
End: 2025-01-31
Payer: MEDICARE

## 2025-01-31 VITALS
HEART RATE: 82 BPM | WEIGHT: 148.38 LBS | TEMPERATURE: 97 F | BODY MASS INDEX: 27.3 KG/M2 | SYSTOLIC BLOOD PRESSURE: 132 MMHG | OXYGEN SATURATION: 98 % | HEIGHT: 62 IN | DIASTOLIC BLOOD PRESSURE: 80 MMHG

## 2025-01-31 DIAGNOSIS — R94.6 BORDERLINE ABNORMAL TFTS: ICD-10-CM

## 2025-01-31 DIAGNOSIS — I25.758: Primary | ICD-10-CM

## 2025-01-31 PROBLEM — R56.9 SEIZURE-LIKE ACTIVITY: Status: RESOLVED | Noted: 2024-11-13 | Resolved: 2025-01-31

## 2025-01-31 RX ORDER — SODIUM BICARBONATE 650 MG/1
650 TABLET ORAL 2 TIMES DAILY
Qty: 60 TABLET | Refills: 0 | Status: SHIPPED | OUTPATIENT
Start: 2025-01-31 | End: 2025-03-02

## 2025-01-31 NOTE — PROGRESS NOTES
Nadia Damon  01/31/2025  [id number]    Elis Wick MD  Patient Care Team:  Elis Wick MD as PCP - General (Internal Medicine)  Miguel Soni Jr., MD as Consulting Physician (Vascular Surgery)  Ike King MD as Consulting Physician (Cardiology)  Courtney Tubbs MD as Consulting Physician (Cardiology)  Carter Crawford MD as Consulting Physician (Nephrology)  Paxton Vasques OD as Consulting Physician (Optometry)  PRANAY Villalobos MD as Obstetrician (Obstetrics)  Ike King MD as Consulting Physician (Cardiology)  Jose Roland MD as Consulting Physician (Dermatology)  Prasanth Johnson MD as Consulting Physician (Rheumatology)  Elis Wick MD as Physician (Internal Medicine)  Lexi Bianchi LCSW as  (Hematology and Oncology)  Kelsie Burk PA-C as Physician Assistant (Hematology and Oncology)  Chelsea Monte as ED Navigator  Jacobs, Kymeesha    Visit Type: Follow-up    Ms. Damon is a 69 year old female here for scheduled f/u.     Ms. Damon w h/o heart transplant 1993, on anti-rejection medication. 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 She was treated with 1 cycle of systemic chemotherapy cytoxan and taxotere and udenyca that was discontinued due to toxicity. She has been followed by radiation oncology and completed last radiation treatment 5/14/22. 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       Ms. Damon suffered CVA 7/19/24 with subsequent hospitalization then transfer to [facility] to address on-going dysarthria and R-sided weakness. She also has severe spinal stenosis. Most recently s/p L 4-5 and L5-S1 RFA 12/05/24.      Prior to CVA: Interstim placed R hip 6/11/24 w Urology Dr. Cochran      Other medical issues include depresssion/anxiety/insomnia,  hypertension, chronic diastolic CHF, and osteoporois for which she is receiving Prolia.     History of Present Illness    CHIEF COMPLAINT:  Ms. Damon presents today for follow up and needs cardiac clearance for upcoming procedures    CARDIAC:  She reports a recent emergency room visit last Sunday due to chest pain and difficulty breathing, requiring three doses of nitro for symptom relief. She continues to experience occasional chest grabbing sensation.    UPCOMING PROCEDURES:  She has completed right side cath with left side scheduled for Feb 6th. Stimulator removal is scheduled for the 18th, requiring surgical incisions at hip and tailbone. Stimulator is not functioning properly and causing pain.    CURRENT SYMPTOMS:  She experiences bilateral leg swelling, right worse than left, and reports stomach soreness. She had a fall approximately 2 weeks ago due to right foot numbness after showering. She also experiences muscle spasms in her back while in bed and intermittently at other times as well.    MEDICAL HISTORY:  She has stage 5 kidney disease and has transferred kidney care from Dr. Crawford to Dr. Melgar.    LABS:  Last TSH was elevated with normal T4. She has not been taking biotin.       PHQ-4 Score: 0     From LOV w me 12/11/24  Ms. Damon presents today for follow-up after undergoing RFA for left lumbar L4-5, L5-S1.  PAIN MANAGEMENT:  She reports starting to feel relief following the RFA procedure performed six days ago. Pain level has reduced significantly, now reported as 2-3 out of 10. Her pain medicine doctor informed her that full effect should be expected by two weeks post-procedure.  CARDIOVASCULAR:  She has a history of heart failure and heart transplant. Her cardiologist, Dr. Lopez, advised against any further surgeries or procedures that would require sedation due to her cardiac condition. She is a long-term heart transplant survivor for 32 years! Home blood pressure readings ranging from  145/88 to 160/80 recently. These measurements are usually taken in the morning before taking anti-hypertensive medications. She takes her blood pressure medication at 8:00 AM, after taking other kidney-related medication prescribed by Dr. Crawford. She uses nitroglycerin tablets as needed, typically taking up to two tablets when experiencing chest discomfort during activities like walking or cleaning. She sits down, takes deep breaths, and uses the nitro when symptoms onset. She has noted headaches with nitroglycerin use.  URINARY ISSUES:  She follows a scheduled urination routine every two hours as recommended, adhering to this schedule even when not feeling the urge to urinate. She has adjusted the stimulator settings for her urinary symptoms. She expresses uncertainty about the stimulator's effectiveness in managing symptoms due to concurrent pain issues, but mentions experiencing some relief after recent adjustments.  MEDICATIONS:  She reports issues with Pregabalin dosage, stating the pharmacy still has her on the 50 mg dose instead of the prescribed 25 mg dose. She has been taking the 50 mg dose only at night to limit daytime sedation . She prefers medications be sent to [facility] now and requests a 90-day supply. She is still taking Xanax nightly for sleep.  ENDOCRINE:  Advised to HOLD biotin starting January 1st in preparation for thyroid function testing scheduled for January 16th.   LYMPHEDEMA:  She has alerted us of left arm precautions due to cancer treatment and lymph node removal on that side. She has been advised to avoid blood pressure measurements and IV placements on the left arm. I advised we will be sure these precautions are added as a flag in her chart.   VENOUS STASIS:  She experiences lower extremity edema with prolonged standing or walking. The swelling can become severe enough to cause pain but improves with leg elevation. She denies relation to salt intake or diet. Advised again to  wear compression socks or stockings to help manage the condition.  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, unspecified vessel or lesion type [I25.758]    2/10/2025 Gunner Melgar MD Nephrology EP//HJB    2/14/2025 Génesis Gonzales LCSW Primary Care NEEDS LYFT - LCSW    2/24/2025  Lab Kamyar    2/26/2025 Yudith Mendoza NP Hematology and Oncology 6wk lab retacrit    2/28/2025 Mere Pineda, FNP-C Primary Care One month f/u    3/3/2025 Carter Crawford MD Nephrology 2m follow up / overbook per     4/3/2025  Radiology     4/9/2025 Christoph Douglas MD Surgery 6 month hernia check        The following were reviewed: Active problem list, medication list, allergies, family history, social history, and Health Maintenance.     Medications have been reviewed and reconciled with patient at visit today.    Exam: sat 98%  Vitals:    01/31/25 1025   BP: 132/80   Pulse: 82   Temp: 97.2 °F (36.2 °C)     Weight: 67.3 kg (148 lb 5.9 oz)   Body mass index is 27.14 kg/m².    BP Readings from Last 3 Encounters:   01/31/25 132/80   01/19/25 (!) 152/75   01/17/25 (!) 161/88      Wt Readings from Last 3 Encounters:   01/31/25 1025 67.3 kg (148 lb 5.9 oz)   01/19/25 1607 73 kg (160 lb 15 oz)   01/17/25 1302 67 kg (147 lb 11.3 oz)      Physical Exam     Physical Exam    Vitals: O2 saturation: 98%. Weight: 148 lbs.  Cardiovascular: Irregular heart rate. Extra heart beat, no murmur.  Lungs: All normal.           Laboratory Reviewed  Lab Results   Component Value Date    WBC 3.41 (L) 01/19/2025    HGB 9.6 (L) 01/19/2025    HCT 30.1 (L) 01/19/2025     01/19/2025    MCV 92 01/19/2025    CHOL 142 07/20/2024    TRIG 85 07/20/2024    HDL 44 07/20/2024    LDLCALC 81.0 07/20/2024    ALT 57 (H) 01/19/2025     (H) 01/19/2025     01/19/2025    K 3.2 (L) 01/19/2025     01/19/2025    CREATININE 4.2 (H) 01/19/2025    BUN 53 (H) 01/19/2025    CO2 21 (L) 01/19/2025    MG 2.0 11/15/2024    TSH 9.132 (H) 01/16/2025    FREET4 0.91 01/16/2025    PSA <0.1 05/27/2008    INR 1.0 11/12/2024    HGBA1C 5.1 07/02/2024    CRP 14.4 (H) 08/24/2023     Lab Results   Component Value Date    .6 (H) 12/12/2024    CALCIUM 9.6 01/19/2025    CAION 1.13 09/05/2018    PHOS 4.3 12/12/2024      Lab Results   Component Value Date    BGDWVYEY10 1373 (H) 06/20/2023     Lab Results   Component Value Date    FOLATE 20.0 06/20/2023      Lab Results   Component Value Date    IRON 79 12/20/2024    TRANSFERRIN 233 12/20/2024    TIBC 345 12/20/2024    FESATURATED 23 12/20/2024      Lab Results   Component Value Date    EGFRNORACEVR 11 (A) 01/19/2025    ALBUMIN 3.4 (L) 01/19/2025     (H) 01/19/2025     Lab Results   Component Value Date    GERMEQTU53MF 39 07/02/2024     Assessment:   69 y.o. female with multiple co-morbid illnesses here for continued follow up of medical problems.      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 A diagnosis of Borderline abnormal TFTs was also pertinent to this visit.      Plan:   1. 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 Borderline abnormal TFTs  -     TSH; Future; Expected date: 02/24/2025  -     T3; Future; Expected date:  02/24/2025    Other orders  -     sodium bicarbonate 650 MG tablet; Take 1 tablet (650 mg total) by mouth 2 (two) times daily.  Dispense: 60 tablet; Refill: 0         Health Maintenance         Date Due Completion Date    RSV Vaccine (Age 60+ and Pregnant patients) (1 - Risk 60-74 years 1-dose series) Never done ---    Mammogram 04/18/2025 4/18/2024    Lipid Panel 07/20/2025 7/20/2024    Colorectal Cancer Screening 02/25/2026 2/25/2021    Hemoglobin A1c (Diabetic Prevention Screening) 07/02/2027 7/2/2024    DEXA Scan 07/03/2027 7/3/2024    TETANUS VACCINE 09/09/2030 9/9/2020            -Patient's lab results were reviewed and discussed with patient  -Treatment options and alternatives were discussed with the patient. Patient expressed understanding. Patient was given the opportunity to ask questions and be an active participant in their medical care. Patient had no further questions or concerns at this time.     Follow up: Follow up in about 1 month (around 2/28/2025) for Follow Up.    After visit summary printed and given to patient upon discharge.  Patient care plan included in After visit summary.    TOTAL TIME evaluating and managing this patient for this encounter was 33 minutes. This time was spent personally by me on some of the following activities: review of patient's past medical history, assessing age-appropriate health maintenance needs, review of any interval history, review and interpretation of lab results, review and interpretation of imaging test results, review and interpretation of cardiology test results, reviewing consulting specialist notes, obtaining history from the patient and family, examination of the patient, medication reconciliation, managing and/or ordering prescription medications, ordering imaging tests, ordering referral to subspecialty provider(s), educating patient and answering their questions about diagnosis, treatment plan, and goals of treatment, discussing planned follow-up  and final documentation of the visit. This time was exclusive of any separately billable procedures for this patient and exclusive of time spent treating any other patients.     This note was generated with the assistance of ambient listening technology. Verbal consent was obtained by the patient and accompanying visitor(s) for the recording of patient appointment to facilitate this note. I attest to having reviewed and edited the generated note for accuracy, though some syntax or spelling errors may persist. Please contact the author of this note for any clarification.

## 2025-01-31 NOTE — PATIENT INSTRUCTIONS
If you are feeling unwell, we'd like to be the first ones to know here at [facility]! Please give us a call. Same day appointments are our top priority to keep you well and out of the emergency rooms and hospitals. Call [phone] for our direct line. After hours advice is always available. Please call [phone] after hours to speak to the on-call team.      Let u know if you get an appointment w your Cardiologist Dr. King

## 2025-01-31 NOTE — PROGRESS NOTES
CHW - Initial Contact    This Community Health Worker updated the Social Determinant of Health questionnaire with patient via telephone today.    Pt identified barriers of most importance are. None at this time.   Referrals to community agencies completed with patient consent outside of [facility] include. No outside referrals at this time.  Referrals were put through [facility] - no  Support and Services: None  Other information discussed the patient needs help with. Pt spoke about her surgery and the after care she may need so I will reach out to her after surgery to see if she needs any resource I can help with.  Follow up required: Yes  Follow-up Outreach - Due: 2/27/2025

## 2025-02-04 ENCOUNTER — TELEPHONE (OUTPATIENT)
Dept: LAB | Facility: HOSPITAL | Age: 70
End: 2025-02-04
Payer: MEDICARE

## 2025-02-04 RX ORDER — SODIUM BICARBONATE 650 MG/1
650 TABLET ORAL 2 TIMES DAILY
Qty: 60 TABLET | Refills: 0 | OUTPATIENT
Start: 2025-02-04 | End: 2025-03-06

## 2025-02-04 RX ORDER — FUROSEMIDE 20 MG/1
40 TABLET ORAL DAILY
Start: 2025-02-04 | End: 2025-08-03

## 2025-02-04 RX ORDER — CALCITRIOL 0.25 UG/1
0.25 CAPSULE ORAL DAILY
Qty: 30 CAPSULE | Refills: 11 | OUTPATIENT
Start: 2025-02-04

## 2025-02-05 ENCOUNTER — HOSPITAL ENCOUNTER (OUTPATIENT)
Dept: RADIOLOGY | Facility: HOSPITAL | Age: 70
Discharge: HOME | End: 2025-02-05
Attending: NURSE PRACTITIONER
Payer: MEDICARE

## 2025-02-05 ENCOUNTER — OFFICE VISIT (OUTPATIENT)
Dept: CARDIOLOGY | Facility: CLINIC | Age: 70
End: 2025-02-05
Payer: MEDICARE

## 2025-02-05 ENCOUNTER — OFFICE VISIT (OUTPATIENT)
Dept: PALLIATIVE MEDICINE | Facility: CLINIC | Age: 70
End: 2025-02-05
Payer: MEDICARE

## 2025-02-05 VITALS
HEART RATE: 84 BPM | SYSTOLIC BLOOD PRESSURE: 154 MMHG | HEIGHT: 62 IN | OXYGEN SATURATION: 99 % | BODY MASS INDEX: 26.98 KG/M2 | WEIGHT: 146.63 LBS | DIASTOLIC BLOOD PRESSURE: 87 MMHG | TEMPERATURE: 97 F

## 2025-02-05 VITALS
DIASTOLIC BLOOD PRESSURE: 88 MMHG | OXYGEN SATURATION: 100 % | WEIGHT: 148.25 LBS | BODY MASS INDEX: 27.12 KG/M2 | HEART RATE: 64 BPM | SYSTOLIC BLOOD PRESSURE: 140 MMHG

## 2025-02-05 DIAGNOSIS — M54.6 THORACIC BACK PAIN, UNSPECIFIED BACK PAIN LATERALITY, UNSPECIFIED CHRONICITY: ICD-10-CM
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
## 2025-02-05 DIAGNOSIS — I50.32 CHRONIC DIASTOLIC (CONGESTIVE) HEART FAILURE: Chronic | ICD-10-CM

## 2025-02-05 DIAGNOSIS — D84.821 IMMUNOCOMPROMISED STATE DUE TO DRUG THERAPY: ICD-10-CM

## 2025-02-05 DIAGNOSIS — Z79.899 IMMUNOCOMPROMISED STATE DUE TO DRUG THERAPY: ICD-10-CM

## 2025-02-05 DIAGNOSIS — N18.4 CKD (CHRONIC KIDNEY DISEASE) STAGE 4, GFR 15-29 ML/MIN: ICD-10-CM

## 2025-02-05 DIAGNOSIS — I50.32 CHRONIC DIASTOLIC HEART FAILURE: ICD-10-CM

## 2025-02-05 DIAGNOSIS — I10 CHRONIC HYPERTENSION: Primary | ICD-10-CM

## 2025-02-05 DIAGNOSIS — Z51.5 ENCOUNTER FOR PALLIATIVE CARE: ICD-10-CM

## 2025-02-05 DIAGNOSIS — Z94.1 HISTORY OF HEART TRANSPLANT: ICD-10-CM

## 2025-02-05 DIAGNOSIS — Z94.1 HEART TRANSPLANTED: ICD-10-CM

## 2025-02-05 DIAGNOSIS — I25.758: ICD-10-CM

## 2025-02-05 DIAGNOSIS — I10 ESSENTIAL HYPERTENSION: ICD-10-CM

## 2025-02-05 DIAGNOSIS — M54.6 THORACIC BACK PAIN, UNSPECIFIED BACK PAIN LATERALITY, UNSPECIFIED CHRONICITY: Primary | ICD-10-CM

## 2025-02-05 PROCEDURE — 3008F BODY MASS INDEX DOCD: CPT | Mod: HCNC,CPTII,S$GLB, | Performed by: INTERNAL MEDICINE

## 2025-02-05 PROCEDURE — 1123F ACP DISCUSS/DSCN MKR DOCD: CPT | Mod: HCNC,CPTII,S$GLB, | Performed by: INTERNAL MEDICINE

## 2025-02-05 PROCEDURE — 3008F BODY MASS INDEX DOCD: CPT | Mod: HCNC,CPTII,S$GLB, | Performed by: NURSE PRACTITIONER

## 2025-02-05 PROCEDURE — 1125F AMNT PAIN NOTED PAIN PRSNT: CPT | Mod: HCNC,CPTII,S$GLB, | Performed by: INTERNAL MEDICINE

## 2025-02-05 PROCEDURE — 72100 X-RAY EXAM L-S SPINE 2/3 VWS: CPT | Mod: TC,HCNC

## 2025-02-05 PROCEDURE — 1101F PT FALLS ASSESS-DOCD LE1/YR: CPT | Mod: HCNC,CPTII,S$GLB, | Performed by: INTERNAL MEDICINE

## 2025-02-05 PROCEDURE — 3077F SYST BP >= 140 MM HG: CPT | Mod: HCNC,CPTII,S$GLB, | Performed by: INTERNAL MEDICINE

## 2025-02-05 PROCEDURE — 1125F AMNT PAIN NOTED PAIN PRSNT: CPT | Mod: HCNC,CPTII,S$GLB, | Performed by: NURSE PRACTITIONER

## 2025-02-05 PROCEDURE — 99417 PROLNG OP E/M EACH 15 MIN: CPT | Mod: HCNC,S$GLB,, | Performed by: NURSE PRACTITIONER

## 2025-02-05 PROCEDURE — 99215 OFFICE O/P EST HI 40 MIN: CPT | Mod: HCNC,S$GLB,, | Performed by: NURSE PRACTITIONER

## 2025-02-05 PROCEDURE — 72100 X-RAY EXAM L-S SPINE 2/3 VWS: CPT | Mod: 26,HCNC,, | Performed by: RADIOLOGY

## 2025-02-05 PROCEDURE — 72070 X-RAY EXAM THORAC SPINE 2VWS: CPT | Mod: 26,HCNC,, | Performed by: RADIOLOGY

## 2025-02-05 PROCEDURE — 1160F RVW MEDS BY RX/DR IN RCRD: CPT | Mod: HCNC,CPTII,S$GLB, | Performed by: NURSE PRACTITIONER

## 2025-02-05 PROCEDURE — 99999 PR PBB SHADOW E&M-EST. PATIENT-LVL IV: CPT | Mod: PBBFAC,HCNC,, | Performed by: NURSE PRACTITIONER

## 2025-02-05 PROCEDURE — 3079F DIAST BP 80-89 MM HG: CPT | Mod: HCNC,CPTII,S$GLB, | Performed by: INTERNAL MEDICINE

## 2025-02-05 PROCEDURE — 3288F FALL RISK ASSESSMENT DOCD: CPT | Mod: HCNC,CPTII,S$GLB, | Performed by: INTERNAL MEDICINE

## 2025-02-05 PROCEDURE — 72070 X-RAY EXAM THORAC SPINE 2VWS: CPT | Mod: TC,HCNC

## 2025-02-05 PROCEDURE — 1123F ACP DISCUSS/DSCN MKR DOCD: CPT | Mod: HCNC,CPTII,S$GLB, | Performed by: NURSE PRACTITIONER

## 2025-02-05 PROCEDURE — 99214 OFFICE O/P EST MOD 30 MIN: CPT | Mod: HCNC,S$GLB,, | Performed by: INTERNAL MEDICINE

## 2025-02-05 PROCEDURE — 1159F MED LIST DOCD IN RCRD: CPT | Mod: HCNC,CPTII,S$GLB, | Performed by: NURSE PRACTITIONER

## 2025-02-05 PROCEDURE — 3079F DIAST BP 80-89 MM HG: CPT | Mod: HCNC,CPTII,S$GLB, | Performed by: NURSE PRACTITIONER

## 2025-02-05 PROCEDURE — 1101F PT FALLS ASSESS-DOCD LE1/YR: CPT | Mod: HCNC,CPTII,S$GLB, | Performed by: NURSE PRACTITIONER

## 2025-02-05 PROCEDURE — 1159F MED LIST DOCD IN RCRD: CPT | Mod: HCNC,CPTII,S$GLB, | Performed by: INTERNAL MEDICINE

## 2025-02-05 PROCEDURE — 3288F FALL RISK ASSESSMENT DOCD: CPT | Mod: HCNC,CPTII,S$GLB, | Performed by: NURSE PRACTITIONER

## 2025-02-05 PROCEDURE — 99999 PR PBB SHADOW E&M-EST. PATIENT-LVL IV: CPT | Mod: PBBFAC,HCNC,, | Performed by: INTERNAL MEDICINE

## 2025-02-05 PROCEDURE — 3077F SYST BP >= 140 MM HG: CPT | Mod: HCNC,CPTII,S$GLB, | Performed by: NURSE PRACTITIONER

## 2025-02-05 RX ORDER — FUROSEMIDE 40 MG/1
40 TABLET ORAL DAILY
Qty: 30 TABLET | Refills: 11 | Status: SHIPPED | OUTPATIENT
Start: 2025-02-05 | End: 2026-01-31

## 2025-02-05 RX ORDER — NIFEDIPINE 60 MG/1
60 TABLET, EXTENDED RELEASE ORAL DAILY
Qty: 30 TABLET | Refills: 11 | Status: SHIPPED | OUTPATIENT
Start: 2025-02-05

## 2025-02-05 RX ORDER — NIFEDIPINE 60 MG/1
60 TABLET, EXTENDED RELEASE ORAL DAILY
Qty: 30 TABLET | Refills: 11 | Status: SHIPPED | OUTPATIENT
Start: 2025-02-05 | End: 2025-02-05

## 2025-02-05 RX ORDER — FUROSEMIDE 20 MG/1
40 TABLET ORAL DAILY
Qty: 60 TABLET | Refills: 0 | Status: SHIPPED | OUTPATIENT
Start: 2025-02-05 | End: 2025-02-05 | Stop reason: SDUPTHER

## 2025-02-05 RX ORDER — FUROSEMIDE 40 MG/1
40 TABLET ORAL DAILY
Qty: 30 TABLET | Refills: 11 | Status: SHIPPED | OUTPATIENT
Start: 2025-02-05 | End: 2025-02-05

## 2025-02-05 RX ORDER — FUROSEMIDE 20 MG/1
40 TABLET ORAL DAILY
Start: 2025-02-05 | End: 2025-08-04

## 2025-02-05 NOTE — PROGRESS NOTES
Palliative Medicine Clinic Note    Chief Complaint:   Chief Complaint   Patient presents with    Pain And Symptom Management        ASSESSMENT/PLAN:      Plan/Recommendations:  Problem List Items Addressed This Visit          Orthopedic    Fall     Mild reproduced pain at around T12  Now back pain in general uncontrolled.   Discussed mgmt options including taking tizanidine as prescribed.   Consider increasing pregabalin dose.   Will image and message PCP and pain mgmt.   Discussed safety measures, deescalating medications but not amenable          Relevant Orders    X-Ray Thoracic Spine AP Lateral    X-Ray Lumbar Spine AP And Lateral       Palliative Care    Encounter for palliative care     Goals of care: What is most important right now is to focus on remaining as independent as possible, symptom/pain control, and improvement in condition but with limits to invasive therapies.  Advanced directive: Full code. Becomes very anxious, agitated during discussions.   HC POA: Moy Neri  Symptoms: Back pain, anxiety, insomnia  Follow up: 2 weeks, sooner if needed. Virtual okay            Other Visit Diagnoses       Thoracic back pain, unspecified back pain laterality, unspecified chronicity    -  Primary    Relevant Orders    X-Ray Thoracic Spine AP Lateral    X-Ray Lumbar Spine AP And Lateral            Advance Care Planning   Advance Directives:   Living Will: Yes        Copy on chart: Yes    Medical Power of : Yes      Decision Making:  Patient answered questions  Goals of Care: What is most important right now is to focus on remaining as independent as possible, symptom/pain control, improvement in condition but with limits to invasive therapies. Accordingly, we have decided that the best plan to meet the patient's goals includes continuing with treatment.           Thank you for involving me in the care of this patient.     No follow-ups on file.    Future Appointments   Date Time Provider Department  Center   2/5/2025  3:00 PM [facility] XR1- [facility] XRAY [facility]   2/5/2025  3:15 PM [facility] XR1-DR [facility] XRAY [facility]   2/5/2025  4:00 PM Sharyn Hawkins MD [facility]   2/10/2025  2:30 PM Gunner Melgar MD [facility] NEPHRO [place]   2/14/2025  9:00 AM Génesis Gonzales LCSW [facility]   2/24/2025 12:30 PM SANTIAGO BRANTLEY CC LAB [facility] LAB DS [facility]   2/26/2025  1:30 PM Yudith Mendoza NP [facility]   2/28/2025 10:40 AM Mere Pineda FNP-C [facility]   3/3/2025  2:00 PM Carter Crawford MD [facility] NEPHRO [place]   4/3/2025 10:00 AM [facility] MAMMO1 [facility] MAMMO [facility]   4/9/2025 10:40 AM Christoph Douglas MD [facility] GENSUR [place]   4/10/2025  9:00 AM Jassi Pierre MD [facility] INT JUDIT [place]   4/14/2025 11:00 AM Kristine Delgado, NP [facility]        SUBJECTIVE:      History of Present Illness / Interval History:  Nadia Damon is 69 y.o. female wi            th HFpEF, s/p heart transplant 1993, CKD 4-5, anemia of chronic disease, breast cancer, spinal stenosis, urinary incontinence.       Presents to Palliative Care Clinic for advance care planning,, clarification of goals of care, and additional support.    1/9/25 LOV PC  Pain is severe an uncontrolled.   Followed by pain mgmt, planning for RFA.   Continue lidoderm, refill sent to pharmacy  Increase pregabalin and methocarbamol doses.      1/17/25 Urology  Leakage is improved with the InterStim but she has had persistent back pain and believes that the InterStim is causing this pain.  No real problems with stress incontinence.   Interstim removal scheduled 2/18/25.    1/17/25 Hematology  INF EPOETIN TRISTAN (RETACRIT) INITIAL DOSES for Other osteoporosis without current pathological fracture, noted on 8/30/2019  INF EPOETIN TRISTAN (RETACRIT) INITIAL DOSES for Anemia associated with stage 5 chronic renal failure, noted on 11/19/2024  epoetin tristan-epbx injection 20,000 Units  20,000 Units, Subcutaneous, PRN    1/31/25 PCP  reviewed, incomplete.       2/5/25  History obtained from: patient and medical record.    Increased pain. Prescribed increased pregabalin and methocarbamol doses last month. Followed by interventional pain mgmt; medial branch clock L4/L5 scheduled 2/6/25.    Took two tablets total tizanidine yesterday.     Has appt today to est with [facility] cardiology for cardiac clearance.     Planning birthday for her 80 year-old cousin!     Not sleeping. Couldn't get comfortable. No relief with muscle relaxer x2.   She was climbing a ladder and couldn't get down. Felt frozen due to back pain. Fall early Tuesday AM from 3rd rung of ladder, approximately 2.5 feet above ground.    Landed on her mattress, legs hit footboard. Denies any injury.     Pain is constant. Doesn't want to take opioids, has oxycodone at home from old rx.   Pain left lower thoracic spine now radiates to left gluteal area. Per pt this is new but seems she had similar concerns previously.     RLE weakness intermittently since CVA.     During visit Ms. Damon is very anxious and tearful at times. Ends visit abruptly, holding back and citing need for pain relief.     Today we discussed symptom management and goals of care.        ROS:  Review of Systems    Review of Symptoms      Symptom Assessment (ESAS 0-10 Scale)  Pain:  10  Dyspnea:  0  Anxiety:  0  Nausea:  0  Depression:  0  Anorexia:  0  Fatigue:  5  Insomnia:  10  Restlessness:  10  Agitation:  0         Pain Assessment:    Location(s): back    Back       Location: generalized        Quality: Aching and burning        Quantity: 10/10 in intensity        Chronicity: Onset 6 month(s) ago, gradually worsening        Aggravating Factors: Sitting up, standing, walking, movement and recumbency        Alleviating Factors: None       Associated Symptoms: None    Performance Status:  60    Living Arrangements:  Lives in apartment and Lives alone    Psychosocial/Cultural:   See Palliative Psychosocial Note:  No  **Primary  to Follow**  Palliative Care  Consult: No        Medications:    Current Outpatient Medications:     aspirin (ECOTRIN) 81 MG EC tablet, Take 1 tablet (81 mg total) by mouth once daily., Disp: , Rfl:     calcitRIOL (ROCALTROL) 0.25 MCG Cap, Take 1 capsule (0.25 mcg total) by mouth once daily., Disp: 30 capsule, Rfl: 11    carvediloL (COREG) 3.125 MG tablet, Take 1 tablet (3.125 mg total) by mouth 2 (two) times daily with meals. Hold parameters: SBP less than 110 and/or DBP less than 75, Disp: 180 tablet, Rfl: 3    cycloSPORINE modified, NEORAL, (NEORAL) 25 MG capsule, TAKE 3 CAPSULES TWICE DAILY, Disp: 540 capsule, Rfl: 0    furosemide (LASIX) 20 MG tablet, Take 2 tablets (40 mg total) by mouth once daily. HOLD UNTIL FOLLOW UP WITH PCP, Disp: 60 tablet, Rfl: 11    LIDOcaine (LIDODERM) 5 %, Place 1 patch onto the skin once daily. Remove & Discard patch within 12 hours or as directed by MD, Disp: 30 patch, Rfl: 2    NIFEdipine (PROCARDIA-XL) 30 MG (OSM) 24 hr tablet, Take 30 mg by mouth once daily., Disp: , Rfl:     nitroGLYCERIN (NITROSTAT) 0.4 MG SL tablet, PLACE 1 TABLET UNDER THE TONGUE EVERY 5 MINS AS NEEDED FOR CHEST PAIN FOR UP TO 3 DOSES.IF CONTINUED HEART PAIN/PRESSURE AFTER, Disp: 100 tablet, Rfl: 0    ondansetron (ZOFRAN) 4 MG tablet, Take 4 mg by mouth as needed for Nausea., Disp: , Rfl:     pantoprazole (PROTONIX) 20 MG tablet, TAKE 1 TABLET ONE TIME DAILY, Disp: 90 tablet, Rfl: 3    patiromer calcium sorbitex (VELTASSA) 8.4 gram PwPk, Take 1 packet (8.4 g total) by mouth once daily., Disp: 90 packet, Rfl: 0    polyethylene glycol (GLYCOLAX) 17 gram PwPk, Take 17 g by mouth once daily., Disp: , Rfl:     pregabalin (LYRICA) 50 MG capsule, Take 1 capsule (50 mg total) by mouth every evening., Disp: 90 capsule, Rfl: 0    sodium bicarbonate 650 MG tablet, Take 1 tablet (650 mg total) by mouth 2 (two) times daily., Disp: 60 tablet, Rfl: 0    temazepam (RESTORIL) 30 mg  capsule, Take 1 capsule (30 mg total) by mouth nightly as needed for Insomnia. FOR INSOMNIA, Disp: 30 capsule, Rfl: 5    tiZANidine (ZANAFLEX) 2 MG tablet, Take 2 tablets (4 mg total) by mouth 2 (two) times a day., Disp: 120 tablet, Rfl: 5    vitamin E 400 UNIT capsule, Take 400 Units by mouth once daily., Disp: , Rfl:     zolpidem (AMBIEN) 5 MG Tab, Take 1 tablet (5 mg total) by mouth every evening., Disp: 30 tablet, Rfl: 5    External  database queried on 02/05/2025  by Luz CORTEZ :         Review of patient's allergies indicates:   Allergen Reactions    Dobutamine Other (See Comments)     Severe Left sided chest pain after dosage administered for Dobutamine Stress Test; itching    Lisinopril Swelling and Rash    Nitro Shortness Of Breath     Audible wheezing - after Nitrolgycerin Spray administered x 2; itching    Hydrocodone-acetaminophen Nausea Only    Augmentin [amoxicillin-pot clavulanate] Diarrhea    Zyvox [linezolid] Nausea And Vomiting        OBJECTIVE:   Physical Exam:  Vitals: Temp: 97.2 °F (36.2 °C) (02/05/25 1023)  Pulse: 84 (02/05/25 1023)  BP: (!) 154/87 (pt stated she just took her bp  meds.) (02/05/25 1023)  SpO2: 99 % (02/05/25 1023)    Physical Exam  Constitutional:       Comments: Appears uncomfortable, holding mid back.   Cardiovascular:      Rate and Rhythm: Normal rate and regular rhythm.      Heart sounds: Murmur heard.   Pulmonary:      Effort: Pulmonary effort is normal.      Breath sounds: Normal breath sounds.   Neurological:      Mental Status: She is alert. Mental status is at baseline.   Psychiatric:         Mood and Affect: Mood is anxious. Affect is labile. Affect is not angry.         Speech: Speech normal.         Behavior: Behavior is not agitated or aggressive. Behavior is cooperative.         Thought Content: Thought content normal.         Wt Readings from Last 3 Encounters:   02/05/25 1023 66.5 kg (146 lb 9.7 oz)   01/31/25 1025 67.3 kg (148 lb 5.9 oz)    01/19/25 1607 73 kg (160 lb 15 oz)     BP Readings from Last 3 Encounters:   02/05/25 (!) 154/87   01/31/25 132/80   01/19/25 (!) 152/75       Labs:  Lab Results   Component Value Date    WBC 3.41 (L) 01/19/2025    HGB 9.6 (L) 01/19/2025    HCT 30.1 (L) 01/19/2025    MCV 92 01/19/2025     01/19/2025       Sodium   Date Value Ref Range Status   01/19/2025 144 136 - 145 mmol/L Final     Potassium   Date Value Ref Range Status   01/19/2025 3.2 (L) 3.5 - 5.1 mmol/L Final     Glucose   Date Value Ref Range Status   01/19/2025 77 70 - 110 mg/dL Final     BUN   Date Value Ref Range Status   01/19/2025 53 (H) 8 - 23 mg/dL Final     Creatinine   Date Value Ref Range Status   01/19/2025 4.2 (H) 0.5 - 1.4 mg/dL Final     Calcium   Date Value Ref Range Status   01/19/2025 9.6 8.7 - 10.5 mg/dL Final     Total Protein   Date Value Ref Range Status   01/19/2025 7.8 6.0 - 8.4 g/dL Final     Albumin   Date Value Ref Range Status   01/19/2025 3.4 (L) 3.5 - 5.2 g/dL Final     AST   Date Value Ref Range Status   01/19/2025 119 (H) 10 - 40 U/L Final     ALT   Date Value Ref Range Status   01/19/2025 57 (H) 10 - 44 U/L Final     eGFR   Date Value Ref Range Status   01/19/2025 11 (A) >60 mL/min/1.73 m^2 Final       Imaging:  X-Ray Chest AP Portable  Narrative: EXAMINATION:  XR CHEST AP PORTABLE    CLINICAL HISTORY:  Chest Pain;    COMPARISON:  November 13, 2024, as well as studies dating back to January 4, 2023    FINDINGS:  EKG leads overlie the chest.  Stable elevation of the right hemidiaphragm.  Stable scarring in the left mid lung.  The lungs are free of new pulmonary opacities.  The cardiac silhouette size is enlarged.  The trachea is midline and the mediastinal width is normal. Negative for focal infiltrate, effusion or pneumothorax. Pulmonary vasculature is normal. Negative for osseous abnormalities. Median sternotomy wires and CABG changes.  Tortuous aorta with calcifications of the aortic knob.  There are degenerative  changes of the spine and both shoulder girdles.  Mild convex right curvature of the upper thoracic spine again seen.  Impression: 1.  Negative for acute process involving the chest.    2.  Stable findings as noted above.    Electronically signed by: Lan Alexandra MD  Date:    01/19/2025  Time:    16:44       I spent a total of 94 minutes on the day of the visit. This includes face to face time in discussion of goals of care, symptom assessment, coordination of care and emotional support.  This also includes non-face to face time preparing to see the patient (eg, review of tests/imaging), obtaining and/or reviewing separately obtained history, documenting clinical information in the electronic or other health record, independently interpreting results and communicating results to the patient/family/caregiver, or care coordinator.         Luz Dickson NP

## 2025-02-05 NOTE — ASSESSMENT & PLAN NOTE
Goals of care: What is most important right now is to focus on remaining as independent as possible, symptom/pain control, and improvement in condition but with limits to invasive therapies.  Advanced directive: Full code. Becomes very anxious, agitated during discussions.   HC POA: Moy Neri  Symptoms: Back pain, anxiety, insomnia  Follow up: 2 weeks, sooner if needed. Virtual okay

## 2025-02-05 NOTE — TELEPHONE ENCOUNTER
Spoke with pt Yesterday afternoon, she explained that she was needing a Cardiac clearance and she will be seeing her local Cardiologist

## 2025-02-05 NOTE — ASSESSMENT & PLAN NOTE
Mild reproduced pain at around T12  Now back pain in general uncontrolled.   Discussed mgmt options including taking tizanidine as prescribed.   Consider increasing pregabalin dose.   Will image and message PCP and pain mgmt.   Discussed safety measures, deescalating medications but not amenable

## 2025-02-05 NOTE — PROGRESS NOTES
Subjective:   Patient ID:  Nadia Damon is a 69 y.o. female who presents for evaluation of Chest Pain      Chest Pain   Pertinent negatives include no palpitations or syncope.     2.5.2025  69-year-old female history of cardiac transplant in 1995.    Followed by [facility].    History of CKD with plan now to start dialysis.    Chronic lower extremity swelling and chronic diastolic heart failure On Lasix 40 mg daily asking refills.    Going for a bladder stimulator removal.  Lungs clear on exam.    She usually follows with Ike Lopez   She is a new patient to me.    She says that she had multiple sedations recently.    Denies any chest pain.  She denies dyspnea with exertion states her mainly issue her pain in her back.  She also has an abdominal hernia but asymptomatic.  Followed by General surgery.    She says that does surgery was offered for that as she is a high-risk given her history of heart transplant.    No angina  Normal stress test twice last year including PET scan and dobutamine stress echo.    Past Medical History:   Diagnosis Date    Abdominal wall hernia     CT Renal 6/11/2018---Small fat containing superior ventral abdominal wall hernia at the epicardial pacing lead site.    Abnormal mammogram 10/12/2021    Acute cystitis without hematuria 05/10/2022    Acute ischemic right middle cerebral artery (MCA) stroke 07/20/2024    Adverse drug reaction 11/12/2024    GRACE (acute kidney injury) 11/22/2021    Anxiety     Aphasia 07/19/2024    Arthritis     ZEN HIPS    Breast cancer in female 08/2021    LEFT BREAST    Breast mass, right 11/13/2024    RIGHT BREAST MASS (Problem)      Bronchitis 08/18/2016    Never smoked      C. difficile colitis 11/29/2021    Cellulitis of axilla, left 12/23/2021    Chronic diastolic heart failure 12/16/2021    Chronic kidney disease     stage 4, GFR 15-29 ml/min    Chronic midline low back pain without sciatica 06/18/2018    CKD (chronic kidney disease) stage 4, GFR  15-29 ml/min 04/20/2016    US Retro   5/16/2018---Mild cortical thinning and increased cortical echogenicity.  Findings can be seen with medical renal disease.  8/31/216--- Echogenic kidneys with diffuse cortical thinning suggesting  medical renal disease. 2 complex right renal cortical cysts.      Closed nondisplaced fracture of distal phalanx of left great toe with routine healing 10/22/2018    Coronary artery disease 1993    heart transplant    COVID-19 in immunocompromised patient 02/26/2024    Cystitis 05/10/2022    Depression     Encounter for blood transfusion     Encounter for palliative care 10/14/2024    Fibromyalgia     on Lyrica    Heart failure     native heart cardiomyopathy    Heart transplanted 1993    due to cardiomyopathy    History of hyperparathyroidism; Hyperparathyroidism, secondary renal     PT DENIES    Hypertension     Immune disorder     anti rejection meds    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    Impaired mobility 07/28/2022    Iron deficiency anemia 08/15/2017    Kidney stones     passed per pt    Obesity     Obesity (BMI 30.0-34.9) 07/22/2019    Other osteoporosis without current pathological fracture 08/30/2019    Other pancytopenia 05/06/2024    Parotitis, acute 09/19/2023    Pharyngitis 01/09/2019    Pneumonia due to infectious organism 03/14/2024    PONV (postoperative nausea and vomiting)     Severe sepsis 11/22/2021    Shingles 2003 approx    left leg    Stage 4 chronic kidney disease 11/22/2021    Subclinical hypothyroidism 06/16/2023    Thrombocytopenia, unspecified 11/29/2021    Trouble in sleeping     Unresponsiveness 11/13/2024    Urinary incontinence        Past Surgical History:   Procedure Laterality Date    bladder implant Right 06/11/2024    BLADDER SURGERY  2015 approx    mesh - Dr Everett then 2nd reconstructive sx Dr Onofre    BREAST BIOPSY Bilateral     NEGATIVE    BREAST BIOPSY Right 10/31/2022    benign    BREAST LUMPECTOMY Left 2021     BREAST SURGERY Left 09/28/2015    Bx - benign    BREAST SURGERY Right 12/2015    Bx benign    CARDIAC PACEMAKER REMOVAL Left 06/26/2014    Pacer defirillator removed. Put in 1993 aat time of heart transplant    CARPAL TUNNEL RELEASE Left 03/03/2015    Dr. Hall    COLONOSCOPY N/A 02/25/2021    Procedure: COLONOSCOPY;  Surgeon: Freida Ramirez MD;  Location: [facility] ENDO;  Service: Endoscopy;  Laterality: N/A;    CYSTOCELE REPAIR      Twice with mesh removal    EPIDURAL STEROID INJECTION INTO CERVICAL SPINE N/A 02/02/2023    Procedure: T11/T12 IL HELLEN;  Surgeon: Jassi Pierre MD;  Location: [facility] PAIN MGT;  Service: Pain Management;  Laterality: N/A;    EPIDURAL STEROID INJECTION INTO CERVICAL SPINE N/A 9/16/2024    Procedure: T11/T12 IL HELLEN;  Surgeon: Jassi Pierre MD;  Location: [facility] PAIN MGT;  Service: Pain Management;  Laterality: N/A;    HEART TRANSPLANT  1993    HERNIA REPAIR Right 1971 approx    Inguinal    HYSTERECTOMY  1983    vag hyst /LSO     IMPLANTATION OF PERMANENT SACRAL NERVE STIMULATOR N/A 06/11/2024    Procedure: INSERTION, NEUROSTIMULATOR, PERMANENT, SACRAL;  Surgeon: Sami Cochran MD;  Location: [facility] OR;  Service: Urology;  Laterality: N/A;    INCISION AND DRAINAGE OF ABSCESS Left 12/24/2021    Procedure: INCISION AND DRAINAGE, ABSCESS;  Surgeon: Joseph Longo MD;  Location: [facility] OR;  Service: General;  Laterality: Left;    INJECTION OF ANESTHETIC AGENT AROUND MEDIAL BRANCH NERVES INNERVATING LUMBAR FACET JOINT Right 10/19/2022    Procedure: Right L4/L5 and L5/S1 MBB;  Surgeon: Jassi Pierre MD;  Location: [facility] PAIN MGT;  Service: Pain Management;  Laterality: Right;    INJECTION OF ANESTHETIC AGENT AROUND MEDIAL BRANCH NERVES INNERVATING LUMBAR FACET JOINT Right 11/09/2022    Procedure: Right L4/L5 and L5/S1 MBB;  Surgeon: Jassi Pierre MD;  Location: [facility] PAIN MGT;  Service: Pain Management;  Laterality: Right;    INJECTION OF ANESTHETIC AGENT INTO SACROILIAC JOINT  Right 08/22/2022    Procedure: Right SIJ Injection Right L5/S1 Facte Injection;  Surgeon: Jassi Pierre MD;  Location: [facility] PAIN MGT;  Service: Pain Management;  Laterality: Right;    INSERTION OF TUNNELED CENTRAL VENOUS CATHETER (CVC) WITH SUBCUTANEOUS PORT N/A 11/09/2021    Procedure: [id number];  Surgeon: Christoph Douglas MD;  Location: [facility] OR;  Service: General;  Laterality: N/A;    RADIOFREQUENCY ABLATION Right 12/5/2024    Procedure: Right L4-5 and L5-S1 RFA;  Surgeon: Jassi Pierre MD;  Location: [facility] PAIN MGT;  Service: Pain Management;  Laterality: Right;    RADIOFREQUENCY THERMOCOAGULATION Right 12/07/2022    Procedure: Right L4/L5 and L5/S1 Lumbar RFA;  Surgeon: Jassi Pierre MD;  [facility];  Service: Pain Management;  Laterality: Right;    REMOVAL OF VASCULAR ACCESS PORT      ROBOT-ASSISTED LAPAROSCOPIC ABDOMINAL SACROCOLPOPEXY N/A 08/10/2023    Procedure: ROBOTIC SACROCOLPOPEXY, ABDOMEN;  Surgeon: PRANAY Villalobos MD;  Location: [facility] OR;  Service: OB/GYN;  Laterality: N/A;    ROBOT-ASSISTED LAPAROSCOPIC OOPHORECTOMY Right 08/10/2023    Procedure: ROBOTIC OOPHORECTOMY;  Surgeon: PRANAY Villalobos MD;  Location: [facility] OR;  Service: OB/GYN;  Laterality: Right;    SENTINEL LYMPH NODE BIOPSY Left 10/12/2021    Procedure: BIOPSY, LYMPH NODE, SENTINEL;  Surgeon: Christoph Douglas MD;  Location: [facility] OR;  Service: General;  Laterality: Left;    TOE SURGERY      XI ROBOTIC URETHROPEXY N/A 08/10/2023    Procedure: XI ROBOTIC URETHROPEXY;  Surgeon: PRANAY Villalobos MD;  Location: [facility] OR;  Service: OB/GYN;  Laterality: N/A;       Social History     Tobacco Use    Smoking status: Never     Passive exposure: Never    Smokeless tobacco: Never   Substance Use Topics    Alcohol use: Never     Alcohol/week: 0.0 standard drinks of alcohol    Drug use: No       Family History   Problem Relation Name Age of Onset    Cancer Mother  38        breast    Breast cancer Mother       Breast cancer Maternal Grandmother      Heart disease Maternal Grandmother      Hypertension Son      Cataracts Cousin      Diabetes Neg Hx      Stroke Neg Hx      Kidney disease Neg Hx      Asthma Neg Hx      COPD Neg Hx      Melanoma Neg Hx      Hyperlipidemia Neg Hx         Review of Systems   Cardiovascular:  Positive for leg swelling. Negative for palpitations and syncope.   Genitourinary: Negative.    Neurological: Negative.        Current Outpatient Medications on File Prior to Visit   Medication Sig    aspirin (ECOTRIN) 81 MG EC tablet Take 1 tablet (81 mg total) by mouth once daily.    calcitRIOL (ROCALTROL) 0.25 MCG Cap Take 1 capsule (0.25 mcg total) by mouth once daily.    carvediloL (COREG) 3.125 MG tablet Take 1 tablet (3.125 mg total) by mouth 2 (two) times daily with meals. Hold parameters: SBP less than 110 and/or DBP less than 75    cycloSPORINE modified, NEORAL, (NEORAL) 25 MG capsule TAKE 3 CAPSULES TWICE DAILY    LIDOcaine (LIDODERM) 5 % Place 1 patch onto the skin once daily. Remove & Discard patch within 12 hours or as directed by MD    nitroGLYCERIN (NITROSTAT) 0.4 MG SL tablet PLACE 1 TABLET UNDER THE TONGUE EVERY 5 MINS AS NEEDED FOR CHEST PAIN FOR UP TO 3 DOSES.IF CONTINUED HEART PAIN/PRESSURE AFTER    ondansetron (ZOFRAN) 4 MG tablet Take 4 mg by mouth as needed for Nausea.    pantoprazole (PROTONIX) 20 MG tablet TAKE 1 TABLET ONE TIME DAILY    patiromer calcium sorbitex (VELTASSA) 8.4 gram PwPk Take 1 packet (8.4 g total) by mouth once daily.    polyethylene glycol (GLYCOLAX) 17 gram PwPk Take 17 g by mouth once daily.    pregabalin (LYRICA) 50 MG capsule Take 1 capsule (50 mg total) by mouth every evening.    sodium bicarbonate 650 MG tablet Take 1 tablet (650 mg total) by mouth 2 (two) times daily.    temazepam (RESTORIL) 30 mg capsule Take 1 capsule (30 mg total) by mouth nightly as needed for Insomnia. FOR INSOMNIA    tiZANidine (ZANAFLEX) 2 MG tablet Take 2 tablets (4 mg total) by  mouth 2 (two) times a day.    vitamin E 400 UNIT capsule Take 400 Units by mouth once daily.    zolpidem (AMBIEN) 5 MG Tab Take 1 tablet (5 mg total) by mouth every evening.    [DISCONTINUED] furosemide (LASIX) 20 MG tablet Take 2 tablets (40 mg total) by mouth once daily. HOLD UNTIL FOLLOW UP WITH PCP    [DISCONTINUED] NIFEdipine (PROCARDIA-XL) 30 MG (OSM) 24 hr tablet Take 30 mg by mouth once daily.    [DISCONTINUED] furosemide (LASIX) 20 MG tablet Take 2 tablets (40 mg total) by mouth once daily. HOLD UNTIL FOLLOW UP WITH PCP     No current facility-administered medications on file prior to visit.       Objective:   Objective:  Wt Readings from Last 3 Encounters:   02/05/25 67.3 kg (148 lb 4.2 oz)   02/05/25 66.5 kg (146 lb 9.7 oz)   01/31/25 67.3 kg (148 lb 5.9 oz)     Temp Readings from Last 3 Encounters:   02/05/25 97.2 °F (36.2 °C)   01/31/25 97.2 °F (36.2 °C) (Temporal)   01/19/25 97.7 °F (36.5 °C) (Oral)     BP Readings from Last 3 Encounters:   02/05/25 (!) 140/88   02/05/25 (!) 154/87   01/31/25 132/80     Pulse Readings from Last 3 Encounters:   02/05/25 64   02/05/25 84   01/31/25 82       Physical Exam  Vitals reviewed.   Constitutional:       Appearance: She is well-developed.   Neck:      Vascular: No carotid bruit.   Cardiovascular:      Rate and Rhythm: Normal rate and regular rhythm.      Pulses: Intact distal pulses.      Heart sounds: Normal heart sounds. No murmur heard.  Pulmonary:      Breath sounds: Normal breath sounds.   Musculoskeletal:         General: Swelling present.   Neurological:      Mental Status: She is oriented to person, place, and time.         Lab Results   Component Value Date    CHOL 142 07/20/2024    CHOL 152 07/19/2024    CHOL 171 07/02/2024     Lab Results   Component Value Date    HDL 44 07/20/2024    HDL 48 07/19/2024    HDL 42 07/02/2024     Lab Results   Component Value Date    LDLCALC 81.0 07/20/2024    LDLCALC 73.8 07/19/2024    LDLCALC 74.8 07/02/2024     Lab  Results   Component Value Date    TRIG 85 07/20/2024    TRIG 151 (H) 07/19/2024    TRIG 271 (H) 07/02/2024     Lab Results   Component Value Date    CHOLHDL 31.0 07/20/2024    CHOLHDL 31.6 07/19/2024    CHOLHDL 24.6 07/02/2024       Chemistry        Component Value Date/Time     01/19/2025 1625    K 3.2 (L) 01/19/2025 1625     01/19/2025 1625    CO2 21 (L) 01/19/2025 1625    BUN 53 (H) 01/19/2025 1625    CREATININE 4.2 (H) 01/19/2025 1625    GLU 77 01/19/2025 1625        Component Value Date/Time    CALCIUM 9.6 01/19/2025 1625    ALKPHOS 96 01/19/2025 1625     (H) 01/19/2025 1625    ALT 57 (H) 01/19/2025 1625    BILITOT 0.5 01/19/2025 1625    ESTGFRAFRICA 19 (A) 07/14/2022 1100    EGFRNONAA 17 (A) 07/14/2022 1100          Lab Results   Component Value Date    TSH 9.132 (H) 01/16/2025     Lab Results   Component Value Date    INR 1.0 11/12/2024    INR 1.0 07/20/2024    INR 0.9 07/19/2024     Lab Results   Component Value Date    WBC 3.41 (L) 01/19/2025    HGB 9.6 (L) 01/19/2025    HCT 30.1 (L) 01/19/2025    MCV 92 01/19/2025     01/19/2025     BNP  @LABRCNTIP(BNP,BNPTRIAGEBLO)@  CrCl cannot be calculated (Patient's most recent lab result is older than the maximum 7 days allowed.).     Imaging:  ======    No results found for this or any previous visit.    No results found for this or any previous visit.    Results for orders placed during the hospital encounter of 07/02/24    X-Ray Chest PA And Lateral    Narrative  EXAM:   XR CHEST PA AND LATERAL    CLINICAL HISTORY: Shortness of breath.  History of heart transplant.    COMPARISON: 12/26/2023.    TECHNIQUE: PA and lateral views    FINDINGS:  Bandlike scarring in the left midlung.  The lungs are otherwise clear. No pneumothorax.  The heart size is stable.  Post median sternotomy.  External pacer device present.    Impression  Stable chest radiograph.    Finalized on: 7/2/2024 10:44 AM By:  OLYA Arcos MD, MD  [facility]# [id number]      2024-07-02  10:46:[id number]    [facility]    No results found for this or any previous visit.    No valid procedures specified.    No results found for this or any previous visit.      No results found for this or any previous visit.      Results for orders placed during the hospital encounter of 11/13/24    Echo    Interpretation Summary    Post cardiac transplantation study - OHTx 5/27/1993 at [facility]    Left Ventricle: The left ventricle is normal in size. Normal wall thickness. There is concentric remodeling. There is normal systolic function with a visually estimated ejection fraction of 55 - 60%. There is indeterminate diastolic function.    Right Ventricle: Normal right ventricular cavity size. Wall thickness is normal. Systolic function is borderline low.    Tricuspid Valve: There is mild regurgitation.    Pulmonary Artery: There is mild pulmonary hypertension. The estimated pulmonary artery systolic pressure is 44 mmHg.    IVC/SVC: Normal venous pressure at 3 mmHg.      Diagnostic Results:  ECG: Reviewed    The ASCVD Risk score (Magdalene LOPEZ, et al., 2019) failed to calculate for the following reasons:    Risk score cannot be calculated because patient has a medical history suggesting prior/existing ASCVD        Assessment and Plan:   Chronic hypertension  -     Discontinue: NIFEdipine (PROCARDIA-XL) 60 MG (OSM) 24 hr tablet; Take 1 tablet (60 mg total) by mouth once daily.  Dispense: 30 tablet; Refill: 11  -     NIFEdipine (PROCARDIA-XL) 60 MG (OSM) 24 hr tablet; Take 1 tablet (60 mg total) by mouth once daily.  Dispense: 30 tablet; Refill: 11    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, unspecified vessel or lesion type  -     Ambulatory referral/consult to Cardiology  -     Discontinue: NIFEdipine (PROCARDIA-XL) 60 MG (OSM) 24 hr tablet; Take 1 tablet (60 mg total) by mouth once daily.  Dispense: 30 tablet; Refill: 11  -     NIFEdipine (PROCARDIA-XL) 60 MG (OSM) 24 hr tablet; Take 1  tablet (60 mg total) by mouth once daily.  Dispense: 30 tablet; Refill: 11    Chronic diastolic (congestive) heart failure  -     Discontinue: furosemide (LASIX) 40 MG tablet; Take 1 tablet (40 mg total) by mouth once daily. HOLD UNTIL FOLLOW UP WITH PCP  Dispense: 30 tablet; Refill: 11  -     furosemide (LASIX) 40 MG tablet; Take 1 tablet (40 mg total) by mouth once daily.  Dispense: 30 tablet; Refill: 11    History of heart transplant    CKD (chronic kidney disease) stage 4, GFR 15-29 ml/min    Immunocompromised state due to drug therapy    Heart transplanted    Essential hypertension    Chronic diastolic heart failure      Reviewed PET scan and dobutamine stress test normal last year.    Most recent echocardiogram with EF of 55-60%.    Chronic lower extremity swelling but also has CKD.  Okay to proceed from cardiac standpoint with her neurostimulator removal.    Increase BP medications.    On immunosuppressants.  Follow[facility].  Reviewed all tests and above medical conditions with patient in detail and formulated treatment plan.  Risk factor modification discussed.   Cardiac low salt diet discussed.  Maintaining healthy weight and weight loss goals were discussed in clinic.    Follow up with regular provider Dr. Lopez

## 2025-02-05 NOTE — Clinical Note
Good afternoon,  I saw Ms Damon for palliative visit today. She reports a fall from a ladder 2.5 feet off of the ground 2 days ago. States she landed on a mattress but now has some reproduced pain at T12. She has MBB L4/L5 scheduled tomorrow. I'm getting x-rays of thoracic and lumbar spine today. She ended our visit abruptly citing need to lay down to relieve her pain.   If x-rays show any changes I will message you.   Thank you Luz

## 2025-02-06 ENCOUNTER — HOSPITAL ENCOUNTER (OUTPATIENT)
Facility: HOSPITAL | Age: 70
Discharge: HOME | End: 2025-02-06
Attending: ANESTHESIOLOGY | Admitting: ANESTHESIOLOGY
Payer: MEDICARE

## 2025-02-06 ENCOUNTER — TELEPHONE (OUTPATIENT)
Dept: NEPHROLOGY | Facility: CLINIC | Age: 70
End: 2025-02-06
Payer: MEDICARE

## 2025-02-06 ENCOUNTER — TELEPHONE (OUTPATIENT)
Dept: PAIN MEDICINE | Facility: CLINIC | Age: 70
End: 2025-02-06
Payer: MEDICARE

## 2025-02-06 VITALS
SYSTOLIC BLOOD PRESSURE: 107 MMHG | OXYGEN SATURATION: 100 % | BODY MASS INDEX: 26.68 KG/M2 | WEIGHT: 145 LBS | TEMPERATURE: 97 F | HEART RATE: 72 BPM | RESPIRATION RATE: 15 BRPM | HEIGHT: 62 IN | DIASTOLIC BLOOD PRESSURE: 61 MMHG

## 2025-02-06 DIAGNOSIS — M54.16 LUMBAR RADICULOPATHY: ICD-10-CM
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
## 2025-02-06 DIAGNOSIS — M54.9 DORSALGIA, UNSPECIFIED: ICD-10-CM

## 2025-02-06 DIAGNOSIS — G89.11 ACUTE LOW BACK PAIN DUE TO TRAUMA: ICD-10-CM

## 2025-02-06 DIAGNOSIS — M47.816 LUMBAR SPONDYLOSIS: Primary | ICD-10-CM

## 2025-02-06 DIAGNOSIS — M54.50 ACUTE LOW BACK PAIN DUE TO TRAUMA: ICD-10-CM

## 2025-02-06 DIAGNOSIS — N18.5 CKD (CHRONIC KIDNEY DISEASE) STAGE 5, GFR LESS THAN 15 ML/MIN: Primary | Chronic | ICD-10-CM

## 2025-02-06 PROCEDURE — 64493 INJ PARAVERT F JNT L/S 1 LEV: CPT | Mod: HCNC,LT | Performed by: ANESTHESIOLOGY

## 2025-02-06 PROCEDURE — 63600175 PHARM REV CODE 636 W HCPCS: Mod: HCNC | Performed by: ANESTHESIOLOGY

## 2025-02-06 PROCEDURE — 64493 INJ PARAVERT F JNT L/S 1 LEV: CPT | Mod: HCNC,LT,, | Performed by: ANESTHESIOLOGY

## 2025-02-06 PROCEDURE — 64494 INJ PARAVERT F JNT L/S 2 LEV: CPT | Mod: HCNC,LT | Performed by: ANESTHESIOLOGY

## 2025-02-06 PROCEDURE — 64494 INJ PARAVERT F JNT L/S 2 LEV: CPT | Mod: HCNC,LT,, | Performed by: ANESTHESIOLOGY

## 2025-02-06 RX ORDER — BUPIVACAINE HYDROCHLORIDE 5 MG/ML
INJECTION, SOLUTION EPIDURAL; INTRACAUDAL
Status: DISCONTINUED | OUTPATIENT
Start: 2025-02-06 | End: 2025-02-06 | Stop reason: HOSPADM

## 2025-02-06 RX ORDER — ONDANSETRON HYDROCHLORIDE 2 MG/ML
4 INJECTION, SOLUTION INTRAVENOUS ONCE AS NEEDED
Status: DISCONTINUED | OUTPATIENT
Start: 2025-02-06 | End: 2025-02-06 | Stop reason: HOSPADM

## 2025-02-06 RX ORDER — FENTANYL CITRATE 50 UG/ML
INJECTION, SOLUTION INTRAMUSCULAR; INTRAVENOUS
Status: DISCONTINUED | OUTPATIENT
Start: 2025-02-06 | End: 2025-02-06 | Stop reason: HOSPADM

## 2025-02-06 RX ORDER — MIDAZOLAM HYDROCHLORIDE 1 MG/ML
INJECTION, SOLUTION INTRAMUSCULAR; INTRAVENOUS
Status: DISCONTINUED | OUTPATIENT
Start: 2025-02-06 | End: 2025-02-06 | Stop reason: HOSPADM

## 2025-02-06 NOTE — PLAN OF CARE
Pt discharged home, awake, alert, oriented x's 4,  denies any pain, no apparent distress noted. PIV removed, catheter intact. All questions and concerns addressed and answered, pt verbalizes understanding of discharge process, pt meets discharge criteria and is being discharged to car via wheelchair.

## 2025-02-06 NOTE — DISCHARGE SUMMARY
Discharge Note  Short Stay      SUMMARY     Admit Date: 2/6/2025    Attending Physician: Jassi Pierre MD        Discharge Physician: Jassi Pierre MD        Discharge Date: 2/6/2025 1:36 PM    Procedure(s) (LRB):  Left L4-5 and L5-S1 MBB #1 with local (Left)    Final Diagnosis: Lumbar spondylosis [M47.816]    Disposition: Home or self care    Patient Instructions:   Current Discharge Medication List        CONTINUE these medications which have NOT CHANGED    Details   aspirin (ECOTRIN) 81 MG EC tablet Take 1 tablet (81 mg total) by mouth once daily.      calcitRIOL (ROCALTROL) 0.25 MCG Cap Take 1 capsule (0.25 mcg total) by mouth once daily.  Qty: 30 capsule, Refills: 11      carvediloL (COREG) 3.125 MG tablet Take 1 tablet (3.125 mg total) by mouth 2 (two) times daily with meals. Hold parameters: SBP less than 110 and/or DBP less than 75  Qty: 180 tablet, Refills: 3    Comments: .      cycloSPORINE modified, NEORAL, (NEORAL) 25 MG capsule TAKE 3 CAPSULES TWICE DAILY  Qty: 540 capsule, Refills: 0    Associated Diagnoses: Heart transplanted      furosemide (LASIX) 40 MG tablet Take 1 tablet (40 mg total) by mouth once daily.  Qty: 30 tablet, Refills: 11    Associated Diagnoses: Chronic diastolic (congestive) heart failure      NIFEdipine (PROCARDIA-XL) 60 MG (OSM) 24 hr tablet Take 1 tablet (60 mg total) by mouth once daily.  Qty: 30 tablet, Refills: 11    Comments: .  Associated Diagnoses: 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, unspecified vessel or lesion type; Chronic hypertension      nitroGLYCERIN (NITROSTAT) 0.4 MG SL tablet PLACE 1 TABLET UNDER THE TONGUE EVERY 5 MINS AS NEEDED FOR CHEST PAIN FOR UP TO 3 DOSES.IF CONTINUED HEART PAIN/PRESSURE AFTER  Qty: 100 tablet, Refills: 0      ondansetron (ZOFRAN) 4 MG tablet Take 4 mg by mouth as needed for Nausea.      pantoprazole (PROTONIX) 20 MG tablet TAKE 1 TABLET ONE TIME DAILY  Qty: 90 tablet, Refills: 3    Associated  Diagnoses: Gastroesophageal reflux disease, unspecified whether esophagitis present      patiromer calcium sorbitex (VELTASSA) 8.4 gram PwPk Take 1 packet (8.4 g total) by mouth once daily.  Qty: 90 packet, Refills: 0      polyethylene glycol (GLYCOLAX) 17 gram PwPk Take 17 g by mouth once daily.      pregabalin (LYRICA) 50 MG capsule Take 1 capsule (50 mg total) by mouth every evening.  Qty: 90 capsule, Refills: 0    Associated Diagnoses: Fibromyalgia      sodium bicarbonate 650 MG tablet Take 1 tablet (650 mg total) by mouth 2 (two) times daily.  Qty: 60 tablet, Refills: 0      temazepam (RESTORIL) 30 mg capsule Take 1 capsule (30 mg total) by mouth nightly as needed for Insomnia. FOR INSOMNIA  Qty: 30 capsule, Refills: 5    Associated Diagnoses: Primary insomnia      tiZANidine (ZANAFLEX) 2 MG tablet Take 2 tablets (4 mg total) by mouth 2 (two) times a day.  Qty: 120 tablet, Refills: 5      vitamin E 400 UNIT capsule Take 400 Units by mouth once daily.      zolpidem (AMBIEN) 5 MG Tab Take 1 tablet (5 mg total) by mouth every evening.  Qty: 30 tablet, Refills: 5    Associated Diagnoses: Primary insomnia      LIDOcaine (LIDODERM) 5 % Place 1 patch onto the skin once daily. Remove & Discard patch within 12 hours or as directed by MD  Qty: 30 patch, Refills: 2                 Discharge Diagnosis: Lumbar spondylosis [M47.816]  Condition on Discharge: Stable with no complications to procedure   Diet on Discharge: Same as before.  Activity: as per instruction sheet.  Discharge to: Home with a responsible adult.  Follow up: 2-4 weeks       Please call the office at [phone] if you experience any weakness or loss of sensation, fever > 101.5, pain uncontrolled with oral medications, persistent nausea/vomiting/or diarrhea, redness or drainage from the incisions, or any other worrisome concerns. If physician on call was not reached or could not communicate with our office for any reason please go to the nearest  emergency department

## 2025-02-06 NOTE — TELEPHONE ENCOUNTER
----- Message from Jassi Pierre MD sent at 2/6/2025  3:48 PM CST -----  Can you get this patient is scheduled for the CT of the lumbar spine I just ordered and then a follow up with either myself or an LIA?  Thanks!

## 2025-02-06 NOTE — OP NOTE
LUMBAR Medial Branch Block Under Fluoroscopy,  Left L4/5 and L5/S1    INFORMED CONSENT: The procedure, risks, benefits and options were discussed with patient. There are no contraindications to the procedure. The patient expressed understanding and agreed to proceed. The personnel performing the procedure was discussed.    Date of procedure 02/06/2025    Time-out taken to identify patient and procedure side prior to starting the procedure.                                                                PROCEDURE:  Left medial branch block at the transverse processes at the level of L4, L5, and the sacral ala    Pre Procedure diagnosis:    Lumbar spondylosis [M47.816]  1. Lumbar spondylosis        Post-Procedure diagnosis:   same    PHYSICIAN: Jassi Pierre MD  ASSISTANTS: None    MEDICATIONS INJECTED: 0.5% bupivicane, 1mL at each level    LOCAL ANESTHETIC USED: Lidocaine, 1%, 1ml at each level    ESTIMATED BLOOD LOSS:  None.    COMPLICATIONS:  None.    Sedation: Conscious sedation provided by M.D    SEDATION MEDICATIONS: local/IV sedation: Versed 1.5 mg and fentanyl 25 mcg IV.  Conscious sedation ordered by MD.  Patient reevaluated and sedation administered by MD and monitored by RN.  Total sedation time was less than 10 min.    Total sedation time was <10 min      TECHNIQUE: Laying in a prone position, the patient was prepped and draped in the usual sterile fashion using ChloraPrep and fenestrated drape.  The level was determined under fluoroscopic guidance.  Local anesthetic was given by going down to the hub of the 27-gauge 1.25in needle and raising a wheel.  A 25-gauge 3.5inch needle was introduced to the anatomic local of the medial branch at each of the above levels using fluoroscopy in the AP, oblique, and lateral views.  After negative aspiration, medication was injected slowly. The patient tolerated the procedure well.       The patient was monitored after the procedure.  Patient was given post procedure  and discharge instructions to follow at home.  We will see the patient back in two weeks or the patient may call to inform of status. The patient was discharged in a stable condition

## 2025-02-06 NOTE — H&P
HPI  Patient presenting for Procedure(s) (LRB):  Left L4-5 and L5-S1 MBB #1 with local (Left)     Patient on Anti-coagulation: Yes, ASA, may continue    No health changes since previous encounter    Past Medical History:   Diagnosis Date    Abdominal wall hernia     CT Renal 6/11/2018---Small fat containing superior ventral abdominal wall hernia at the epicardial pacing lead site.    Abnormal mammogram 10/12/2021    Acute cystitis without hematuria 05/10/2022    Acute ischemic right middle cerebral artery (MCA) stroke 07/20/2024    Adverse drug reaction 11/12/2024    GRACE (acute kidney injury) 11/22/2021    Anxiety     Aphasia 07/19/2024    Arthritis     ZEN HIPS    Breast cancer in female 08/2021    LEFT BREAST    Breast mass, right 11/13/2024    RIGHT BREAST MASS (Problem)      Bronchitis 08/18/2016    Never smoked      C. difficile colitis 11/29/2021    Cellulitis of axilla, left 12/23/2021    Chronic diastolic heart failure 12/16/2021    Chronic kidney disease     stage 4, GFR 15-29 ml/min    Chronic midline low back pain without sciatica 06/18/2018    CKD (chronic kidney disease) stage 4, GFR 15-29 ml/min 04/20/2016    US Retro   5/16/2018---Mild cortical thinning and increased cortical echogenicity.  Findings can be seen with medical renal disease.  8/31/216--- Echogenic kidneys with diffuse cortical thinning suggesting  medical renal disease. 2 complex right renal cortical cysts.      Closed nondisplaced fracture of distal phalanx of left great toe with routine healing 10/22/2018    Coronary artery disease 1993    heart transplant    COVID-19 in immunocompromised patient 02/26/2024    Cystitis 05/10/2022    Depression     Encounter for blood transfusion     Encounter for palliative care 10/14/2024    Fibromyalgia     on Lyrica    Heart failure     native heart cardiomyopathy    Heart transplanted 1993    due to cardiomyopathy    History of hyperparathyroidism; Hyperparathyroidism, secondary renal     PT DENIES     Hypertension     Immune disorder     anti rejection meds    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    Impaired mobility 07/28/2022    Iron deficiency anemia 08/15/2017    Kidney stones     passed per pt    Obesity     Obesity (BMI 30.0-34.9) 07/22/2019    Other osteoporosis without current pathological fracture 08/30/2019    Other pancytopenia 05/06/2024    Parotitis, acute 09/19/2023    Pharyngitis 01/09/2019    Pneumonia due to infectious organism 03/14/2024    PONV (postoperative nausea and vomiting)     Severe sepsis 11/22/2021    Shingles 2003 approx    left leg    Stage 4 chronic kidney disease 11/22/2021    Subclinical hypothyroidism 06/16/2023    Thrombocytopenia, unspecified 11/29/2021    Trouble in sleeping     Unresponsiveness 11/13/2024    Urinary incontinence      Past Surgical History:   Procedure Laterality Date    bladder implant Right 06/11/2024    BLADDER SURGERY  2015 approx    mesh - Dr Everett then 2nd reconstructive sx Dr Onofre    BREAST BIOPSY Bilateral     NEGATIVE    BREAST BIOPSY Right 10/31/2022    benign    BREAST LUMPECTOMY Left 2021    BREAST SURGERY Left 09/28/2015    Bx - benign    BREAST SURGERY Right 12/2015    Bx benign    CARDIAC PACEMAKER REMOVAL Left 06/26/2014    Pacer defirillator removed. Put in 1993 aat time of heart transplant    CARPAL TUNNEL RELEASE Left 03/03/2015    Dr. Hall    COLONOSCOPY N/A 02/25/2021    Procedure: COLONOSCOPY;  Surgeon: Freida Ramirez MD;  Location: [facility];  Service: Endoscopy;  Laterality: N/A;    CYSTOCELE REPAIR      Twice with mesh removal    EPIDURAL STEROID INJECTION INTO CERVICAL SPINE N/A 02/02/2023    Procedure: T11/T12 IL HELLEN;  Surgeon: Jassi Pierre MD;  Location: [facility]IN [facility]GT;  Service: Pain Management;  Laterality: N/A;    EPIDURAL STEROID INJECTION INTO CERVICAL SPINE N/A 9/16/2024    Procedure: T11/T12 IL HELLEN;  Surgeon: Jassi Pierre MD;  Location: [facility];  Service: Pain Management;   Laterality: N/A;    HEART TRANSPLANT  1993    HERNIA REPAIR Right 1971 approx    Inguinal    HYSTERECTOMY  1983    vag hyst /LSO     IMPLANTATION OF PERMANENT SACRAL NERVE STIMULATOR N/A 06/11/2024    Procedure: INSERTION, NEUROSTIMULATOR, PERMANENT, SACRAL;  Surgeon: Sami Cochran MD;  Location: [facility] OR;  Service: Urology;  Laterality: N/A;    INCISION AND DRAINAGE OF ABSCESS Left 12/24/2021    Procedure: INCISION AND DRAINAGE, ABSCESS;  Surgeon: Joseph Longo MD;  Location: [facility] OR;  Service: General;  Laterality: Left;    INJECTION OF ANESTHETIC AGENT AROUND MEDIAL BRANCH NERVES INNERVATING LUMBAR FACET JOINT Right 10/19/2022    Procedure: Right L4/L5 and L5/S1 MBB;  Surgeon: Jassi Pierre MD;  Location: [facility]IN [facility]GT;  Service: Pain Management;  Laterality: Right;    INJECTION OF ANESTHETIC AGENT AROUND MEDIAL BRANCH NERVES INNERVATING LUMBAR FACET JOINT Right 11/09/2022    Procedure: Right L4/L5 and L5/S1 MBB;  Surgeon: Jassi Pierre MD;  Location: [facility];  Service: Pain Management;  Laterality: Right;    INJECTION OF ANESTHETIC AGENT INTO SACROILIAC JOINT Right 08/22/2022    Procedure: Right SIJ Injection Right L5/S1 Facte Injection;  Surgeon: Jassi Pierre MD;  Location: [facility];  Service: Pain Management;  Laterality: Right;    INSERTION OF TUNNELED CENTRAL VENOUS CATHETER (CVC) WITH SUBCUTANEOUS PORT N/A 11/09/2021    Procedure: YLYAQEPIA-GPOY-J-CATH;  Surgeon: Christoph Douglas MD;  Location: [facility] OR;  Service: General;  Laterality: N/A;    RADIOFREQUENCY ABLATION Right 12/5/2024    Procedure: Right L4-5 and L5-S1 RFA;  Surgeon: Jassi Pierre MD;  Location: [facility];  Service: Pain Management;  Laterality: Right;    RADIOFREQUENCY THERMOCOAGULATION Right 12/07/2022    Procedure: Right L4/L5 and L5/S1 Lumbar RFA;  Surgeon: Jassi Pierre MD;  Location: [facility];  Service: Pain Management;  Laterality: Right;    REMOVAL OF VASCULAR ACCESS PORT       ROBOT-ASSISTED LAPAROSCOPIC ABDOMINAL SACROCOLPOPEXY N/A 08/10/2023    Procedure: ROBOTIC SACROCOLPOPEXY, ABDOMEN;  Surgeon: PRANAY Villalobos MD;  Location: [facility] OR;  Service: OB/GYN;  Laterality: N/A;    ROBOT-ASSISTED LAPAROSCOPIC OOPHORECTOMY Right 08/10/2023    Procedure: ROBOTIC OOPHORECTOMY;  Surgeon: PRANAY Villalobos MD;  Location: [facility] OR;  Service: OB/GYN;  Laterality: Right;    SENTINEL LYMPH NODE BIOPSY Left 10/12/2021    Procedure: BIOPSY, LYMPH NODE, SENTINEL;  Surgeon: Christoph Douglas MD;  Location: [facility] OR;  Service: General;  Laterality: Left;    TOE SURGERY      XI ROBOTIC URETHROPEXY N/A 08/10/2023    Procedure: XI ROBOTIC URETHROPEXY;  Surgeon: PRANAY Villalobos MD;  Location: [facility] OR;  Service: OB/GYN;  Laterality: N/A;     Review of patient's allergies indicates:   Allergen Reactions    Dobutamine Other (See Comments)     Severe Left sided chest pain after dosage administered for Dobutamine Stress Test; itching    Lisinopril Swelling and Rash    Hydrocodone-acetaminophen Nausea Only    Augmentin [amoxicillin-pot clavulanate] Diarrhea    Zyvox [linezolid] Nausea And Vomiting        No current facility-administered medications on file prior to encounter.     Current Outpatient Medications on File Prior to Encounter   Medication Sig Dispense Refill    aspirin (ECOTRIN) 81 MG EC tablet Take 1 tablet (81 mg total) by mouth once daily.      carvediloL (COREG) 3.125 MG tablet Take 1 tablet (3.125 mg total) by mouth 2 (two) times daily with meals. Hold parameters: SBP less than 110 and/or DBP less than 75 180 tablet 3    nitroGLYCERIN (NITROSTAT) 0.4 MG SL tablet PLACE 1 TABLET UNDER THE TONGUE EVERY 5 MINS AS NEEDED FOR CHEST PAIN FOR UP TO 3 DOSES.IF CONTINUED HEART PAIN/PRESSURE AFTER 100 tablet 0    ondansetron (ZOFRAN) 4 MG tablet Take 4 mg by mouth as needed for Nausea.      pantoprazole (PROTONIX) 20 MG tablet TAKE 1 TABLET ONE TIME DAILY 90 tablet 3    polyethylene glycol (GLYCOLAX) 17  gram PwPk Take 17 g by mouth once daily.      vitamin E 400 UNIT capsule Take 400 Units by mouth once daily.          PMHx, PSHx, Allergies, Medications reviewed in epic    ROS negative except pain complaints in HPI    OBJECTIVE:    LMP 06/20/1983 (Within Years)   Breastfeeding No     PHYSICAL EXAMINATION:    GENERAL: Well appearing, in no acute distress, alert and oriented x3.  PSYCH:  Mood and affect appropriate.  SKIN: Skin color, texture, turgor normal, no rashes or lesions which will impact the procedure.  CV: RRR with palpation of the radial artery.  PULM: No evidence of respiratory difficulty, symmetric chest rise. Clear to auscultation.  NEURO: Cranial nerves grossly intact.    Plan:    Proceed with procedure as planned Procedure(s) (LRB):  Left L4-5 and L5-S1 MBB #1 with local (Left)    Jassi Pierre MD  02/06/2025

## 2025-02-06 NOTE — TELEPHONE ENCOUNTER
Called patient to schedule CT of lumbar ordered by Dr. Pierre, no answer and couldn't leave a message.

## 2025-02-06 NOTE — DISCHARGE INSTRUCTIONS

## 2025-02-06 NOTE — TELEPHONE ENCOUNTER
CALLED TO DO LAB AND SHE WILL GO TOMORROW.2/6/25/SF   Chief Complaint   Patient presents with    Routine Prenatal Visit     Patient is here today for her routine prenatal visit.        OB follow up     Rita Rivera is a 20 y.o.  21w5d being seen today for her obstetrical visit.  Patient reports no complaints. Fetal movement: normal.    Review of Systems  Cramping/contractions: denies  Vaginal bleeding: denies  Fetal movement: normal    /75   Wt 57.8 kg (127 lb 6.4 oz)   LMP 2023   BMI 24.07 kg/m²     Assessment/Plan    Diagnoses and all orders for this visit:    1. 21 weeks gestation of pregnancy (Primary)  -     POC Urinalysis Dipstick    2. Rh negative status during pregnancy in second trimester    3. Low-lying placenta  -     US ob follow up transabdominal approach; Future       Normal appearing fetal anatomy,  grams, MVP 4.10 cm, Cervical length 3.97 cm, Low lying posterior placenta with 3 vessel cord, Placenta is 2.04 cm from internal cervical OS   Reviewed bleeding precautions. Pelvic rest until next visit. Will repeat u/s in 4 weeks to monitor low lying placenta  Flu vaccine today  Rh negative: rhogam at 28 weeks   Reviewed fetal kick counts  Reviewed this stage of pregnancy  Problem list updated     Follow up in 4 week(s) with Dr. Etienne     I spent 22 minutes caring for Rita on this date of service. This time includes time spent by me in the following activities: preparing for the visit, reviewing tests, obtaining and/or reviewing a separately obtained history, performing a medically appropriate examination and/or evaluation, counseling and educating the patient/family/caregiver, ordering medications, tests, or procedures, referring and communicating with other health care professionals, and documenting information in the medical record    Princess Lux, APRN  10/4/2023  09:55 EDT

## 2025-02-07 ENCOUNTER — LAB VISIT (OUTPATIENT)
Dept: LAB | Facility: HOSPITAL | Age: 70
End: 2025-02-07
Attending: INTERNAL MEDICINE
Payer: MEDICARE

## 2025-02-07 ENCOUNTER — PATIENT MESSAGE (OUTPATIENT)
Dept: PREADMISSION TESTING | Facility: HOSPITAL | Age: 70
End: 2025-02-07
Payer: MEDICARE

## 2025-02-07 DIAGNOSIS — N18.5 CKD (CHRONIC KIDNEY DISEASE) STAGE 5, GFR LESS THAN 15 ML/MIN: Chronic | ICD-10-CM

## 2025-02-07 LAB
ALBUMIN SERPL BCP-MCNC: 3.3 G/DL (ref 3.5–5.2)
ANION GAP SERPL CALC-SCNC: 13 MMOL/L (ref 8–16)
BUN SERPL-MCNC: 53 MG/DL (ref 8–23)
CALCIUM SERPL-MCNC: 9.3 MG/DL (ref 8.7–10.5)
CHLORIDE SERPL-SCNC: 103 MMOL/L (ref 95–110)
CO2 SERPL-SCNC: 24 MMOL/L (ref 23–29)
CREAT SERPL-MCNC: 4.1 MG/DL (ref 0.5–1.4)
EST. GFR  (NO RACE VARIABLE): 11.1 ML/MIN/1.73 M^2
GLUCOSE SERPL-MCNC: 86 MG/DL (ref 70–110)
PHOSPHATE SERPL-MCNC: 4 MG/DL (ref 2.7–4.5)
POTASSIUM SERPL-SCNC: 4 MMOL/L (ref 3.5–5.1)
SODIUM SERPL-SCNC: 140 MMOL/L (ref 136–145)

## 2025-02-07 PROCEDURE — 36415 COLL VENOUS BLD VENIPUNCTURE: CPT | Mod: HCNC | Performed by: INTERNAL MEDICINE

## 2025-02-07 PROCEDURE — 80069 RENAL FUNCTION PANEL: CPT | Mod: HCNC | Performed by: INTERNAL MEDICINE

## 2025-02-10 ENCOUNTER — OFFICE VISIT (OUTPATIENT)
Dept: NEPHROLOGY | Facility: CLINIC | Age: 70
End: 2025-02-10
Payer: MEDICARE

## 2025-02-10 VITALS
HEIGHT: 62 IN | BODY MASS INDEX: 27.63 KG/M2 | SYSTOLIC BLOOD PRESSURE: 130 MMHG | WEIGHT: 150.13 LBS | RESPIRATION RATE: 18 BRPM | HEART RATE: 68 BPM | DIASTOLIC BLOOD PRESSURE: 72 MMHG

## 2025-02-10 DIAGNOSIS — I10 PRIMARY HYPERTENSION: ICD-10-CM

## 2025-02-10 DIAGNOSIS — R80.1 PERSISTENT PROTEINURIA: ICD-10-CM

## 2025-02-10 DIAGNOSIS — N18.5 CKD (CHRONIC KIDNEY DISEASE), SYMPTOM MANAGEMENT ONLY, STAGE 5: Primary | ICD-10-CM

## 2025-02-10 PROCEDURE — 99999 PR PBB SHADOW E&M-EST. PATIENT-LVL III: CPT | Mod: PBBFAC,HCNC,, | Performed by: INTERNAL MEDICINE

## 2025-02-10 PROCEDURE — 1159F MED LIST DOCD IN RCRD: CPT | Mod: HCNC,CPTII,S$GLB, | Performed by: INTERNAL MEDICINE

## 2025-02-10 PROCEDURE — 1101F PT FALLS ASSESS-DOCD LE1/YR: CPT | Mod: HCNC,CPTII,S$GLB, | Performed by: INTERNAL MEDICINE

## 2025-02-10 PROCEDURE — 3066F NEPHROPATHY DOC TX: CPT | Mod: HCNC,CPTII,S$GLB, | Performed by: INTERNAL MEDICINE

## 2025-02-10 PROCEDURE — 3075F SYST BP GE 130 - 139MM HG: CPT | Mod: HCNC,CPTII,S$GLB, | Performed by: INTERNAL MEDICINE

## 2025-02-10 PROCEDURE — 1125F AMNT PAIN NOTED PAIN PRSNT: CPT | Mod: HCNC,CPTII,S$GLB, | Performed by: INTERNAL MEDICINE

## 2025-02-10 PROCEDURE — 1157F ADVNC CARE PLAN IN RCRD: CPT | Mod: HCNC,CPTII,S$GLB, | Performed by: INTERNAL MEDICINE

## 2025-02-10 PROCEDURE — 3078F DIAST BP <80 MM HG: CPT | Mod: HCNC,CPTII,S$GLB, | Performed by: INTERNAL MEDICINE

## 2025-02-10 PROCEDURE — 1160F RVW MEDS BY RX/DR IN RCRD: CPT | Mod: HCNC,CPTII,S$GLB, | Performed by: INTERNAL MEDICINE

## 2025-02-10 PROCEDURE — 3288F FALL RISK ASSESSMENT DOCD: CPT | Mod: HCNC,CPTII,S$GLB, | Performed by: INTERNAL MEDICINE

## 2025-02-10 PROCEDURE — 99215 OFFICE O/P EST HI 40 MIN: CPT | Mod: HCNC,S$GLB,, | Performed by: INTERNAL MEDICINE

## 2025-02-10 PROCEDURE — 3008F BODY MASS INDEX DOCD: CPT | Mod: HCNC,CPTII,S$GLB, | Performed by: INTERNAL MEDICINE

## 2025-02-10 NOTE — PROGRESS NOTES
"Subjective:       Patient ID: Nadia Damon is a 69 y.o. female.    Chief Complaint:  CKD, hypertension    HPI    She presents to clinic today for routine follow-up.  Since her last office visit she has been doing well and has no specific or new complaints.  Her laboratory studies and medications were reviewed.  All Nephrology related questions were answered to her satisfaction.    Review of Systems   Constitutional: Negative.    HENT: Negative.     Respiratory: Negative.     Cardiovascular: Negative.    Gastrointestinal: Negative.    Genitourinary: Negative.    Musculoskeletal: Negative.    Skin: Negative.        /72   Pulse 68   Resp 18   Ht 5' 2" (1.575 m)   Wt 68.1 kg (150 lb 2.1 oz)   LMP 06/20/1983 (Within Years)   BMI 27.46 kg/m²     Lab Results   Component Value Date    WBC 3.41 (L) 01/19/2025    HGB 9.6 (L) 01/19/2025    HCT 30.1 (L) 01/19/2025    MCV 92 01/19/2025     01/19/2025      BMP  Lab Results   Component Value Date     02/07/2025    K 4.0 02/07/2025     02/07/2025    CO2 24 02/07/2025    BUN 53 (H) 02/07/2025    CREATININE 4.1 (H) 02/07/2025    CALCIUM 9.3 02/07/2025    ANIONGAP 13 02/07/2025    ESTGFRAFRICA 19 (A) 07/14/2022    EGFRNONAA 17 (A) 07/14/2022     CMP  Sodium   Date Value Ref Range Status   02/07/2025 140 136 - 145 mmol/L Final     Potassium   Date Value Ref Range Status   02/07/2025 4.0 3.5 - 5.1 mmol/L Final     Chloride   Date Value Ref Range Status   02/07/2025 103 95 - 110 mmol/L Final     CO2   Date Value Ref Range Status   02/07/2025 24 23 - 29 mmol/L Final     Glucose   Date Value Ref Range Status   02/07/2025 86 70 - 110 mg/dL Final     BUN   Date Value Ref Range Status   02/07/2025 53 (H) 8 - 23 mg/dL Final     Creatinine   Date Value Ref Range Status   02/07/2025 4.1 (H) 0.5 - 1.4 mg/dL Final     Calcium   Date Value Ref Range Status   02/07/2025 9.3 8.7 - 10.5 mg/dL Final     Total Protein   Date Value Ref Range Status   01/19/2025 7.8 6.0 " - 8.4 g/dL Final     Albumin   Date Value Ref Range Status   02/07/2025 3.3 (L) 3.5 - 5.2 g/dL Final     Total Bilirubin   Date Value Ref Range Status   01/19/2025 0.5 0.1 - 1.0 mg/dL Final     Comment:     For infants and newborns, interpretation of results should be based  on gestational age, weight and in agreement with clinical  observations.    Premature Infant recommended reference ranges:  Up to 24 hours.............<8.0 mg/dL  Up to 48 hours............<12.0 mg/dL  3-5 days..................<15.0 mg/dL  6-29 days.................<15.0 mg/dL       Alkaline Phosphatase   Date Value Ref Range Status   01/19/2025 96 40 - 150 U/L Final     AST   Date Value Ref Range Status   01/19/2025 119 (H) 10 - 40 U/L Final     ALT   Date Value Ref Range Status   01/19/2025 57 (H) 10 - 44 U/L Final     Anion Gap   Date Value Ref Range Status   02/07/2025 13 8 - 16 mmol/L Final     eGFR if    Date Value Ref Range Status   07/14/2022 19 (A) >60 mL/min/1.73 m^2 Final     eGFR if non    Date Value Ref Range Status   07/14/2022 17 (A) >60 mL/min/1.73 m^2 Final     Comment:     Calculation used to obtain the estimated glomerular filtration  rate (eGFR) is the CKD-EPI equation.        Current Outpatient Medications on File Prior to Visit   Medication Sig Dispense Refill    aspirin (ECOTRIN) 81 MG EC tablet Take 1 tablet (81 mg total) by mouth once daily.      calcitRIOL (ROCALTROL) 0.25 MCG Cap Take 1 capsule (0.25 mcg total) by mouth once daily. 30 capsule 11    carvediloL (COREG) 3.125 MG tablet Take 1 tablet (3.125 mg total) by mouth 2 (two) times daily with meals. Hold parameters: SBP less than 110 and/or DBP less than 75 180 tablet 3    cycloSPORINE modified, NEORAL, (NEORAL) 25 MG capsule TAKE 3 CAPSULES TWICE DAILY 540 capsule 0    furosemide (LASIX) 40 MG tablet Take 1 tablet (40 mg total) by mouth once daily. 30 tablet 11    LIDOcaine (LIDODERM) 5 % Place 1 patch onto the skin once daily.  Remove & Discard patch within 12 hours or as directed by MD 30 patch 2    NIFEdipine (PROCARDIA-XL) 60 MG (OSM) 24 hr tablet Take 1 tablet (60 mg total) by mouth once daily. 30 tablet 11    nitroGLYCERIN (NITROSTAT) 0.4 MG SL tablet PLACE 1 TABLET UNDER THE TONGUE EVERY 5 MINS AS NEEDED FOR CHEST PAIN FOR UP TO 3 DOSES.IF CONTINUED HEART PAIN/PRESSURE AFTER 100 tablet 0    ondansetron (ZOFRAN) 4 MG tablet Take 4 mg by mouth as needed for Nausea.      pantoprazole (PROTONIX) 20 MG tablet TAKE 1 TABLET ONE TIME DAILY 90 tablet 3    patiromer calcium sorbitex (VELTASSA) 8.4 gram PwPk Take 1 packet (8.4 g total) by mouth once daily. 90 packet 0    polyethylene glycol (GLYCOLAX) 17 gram PwPk Take 17 g by mouth once daily.      pregabalin (LYRICA) 50 MG capsule Take 1 capsule (50 mg total) by mouth every evening. 90 capsule 0    sodium bicarbonate 650 MG tablet Take 1 tablet (650 mg total) by mouth 2 (two) times daily. 60 tablet 0    temazepam (RESTORIL) 30 mg capsule Take 1 capsule (30 mg total) by mouth nightly as needed for Insomnia. FOR INSOMNIA 30 capsule 5    tiZANidine (ZANAFLEX) 2 MG tablet Take 2 tablets (4 mg total) by mouth 2 (two) times a day. 120 tablet 5    vitamin E 400 UNIT capsule Take 400 Units by mouth once daily.      zolpidem (AMBIEN) 5 MG Tab Take 1 tablet (5 mg total) by mouth every evening. 30 tablet 5     No current facility-administered medications on file prior to visit.            Objective:            Physical Exam  Constitutional:       Appearance: Normal appearance.   HENT:      Head: Normocephalic and atraumatic.   Eyes:      General: No scleral icterus.     Extraocular Movements: Extraocular movements intact.      Pupils: Pupils are equal, round, and reactive to light.   Pulmonary:      Effort: Pulmonary effort is normal.      Breath sounds: No stridor.   Musculoskeletal:      Right lower leg: No edema.      Left lower leg: No edema.   Skin:     General: Skin is warm and dry.    Neurological:      General: No focal deficit present.      Mental Status: She is alert and oriented to person, place, and time.   Psychiatric:         Mood and Affect: Mood normal.         Behavior: Behavior normal.       Assessment:       1. CKD (chronic kidney disease), symptom management only, stage 5    2. Primary hypertension    3. Persistent proteinuria        Plan:       1. Creatinine has remained stable for the past 3 months right around 4.0.  She has no symptoms of uremia at this time.  We reviewed uremic symptoms in detail.  She had numerous questions regarding dialysis and modalities.  All were answered to her satisfaction.  She also had multiple questions regarding arteriovenous access.  All were answered to her satisfaction as well.  She would like to hold off on a referral to vascular surgery until she meets with kidney [facility] this month.  Will plan to see her back in about 4 weeks to track her laboratory studies.  Will plan to refer to vascular surgery at that time.    Loss of renal failure is due to chronic calcineurin exposure.  She has been on cyclosporine since 1993 after receiving a heart transplant.    Her potassiums is stable 4.0.    2. Blood pressure is well controlled on current regimen.    3. She has persistent low-grade proteinuria about 500 mg by PC ratio.      Approximately 40 minutes was spent in face-to-face conversation and chart review.      Gunner Melgar MD

## 2025-02-11 ENCOUNTER — TELEPHONE (OUTPATIENT)
Dept: PAIN MEDICINE | Facility: CLINIC | Age: 70
End: 2025-02-11
Payer: MEDICARE

## 2025-02-11 DIAGNOSIS — F51.01 PRIMARY INSOMNIA: ICD-10-CM

## 2025-02-11 DIAGNOSIS — M47.816 LUMBAR SPONDYLOSIS: Primary | ICD-10-CM

## 2025-02-11 NOTE — TELEPHONE ENCOUNTER
Called pt to check on % of relief received from Left Lumbar MBB #1 done on 2/6/25 by Dr. Pierre. Pt reports to following information:    1. What percentage of pain relief did you receive following the block, from 0-100%?  80%    2. What was pain score the day before your procedure on a scale from 0-10? 10/10    3. What was pain score immediately after your procedure (up to 6 hours)  on a scale from 0-10? 6/10    4. When your pain returned, what was your pain score on a scale from 0-10? 0/10     5. How many hours did pain relief last following the block?  10-12 hours     6. During this time, please describe in detail the activities you were able to do? Pt reports standing baking desserts and washing dishes.    .Ronald PALMER          Pain Disability Index (PDI) Score Review:      1/8/2025    10:43 AM 11/20/2024     9:26 AM 1/13/2023    12:27 PM   Last 3 PDI Scores   Pain Disability Index (PDI) 42 42 48

## 2025-02-13 ENCOUNTER — TELEPHONE (OUTPATIENT)
Dept: PREADMISSION TESTING | Facility: HOSPITAL | Age: 70
End: 2025-02-13
Payer: MEDICARE

## 2025-02-13 NOTE — PROGRESS NOTES
DATE:  2025  REFERRAL SOURCE:  Elis Wcik MD  TYPE OF VISIT:  In person  LENGTH OF SESSION: 60  .  HISTORY OF PRESENTING ILLNESS:  Nadia Damon, a 69 y.o. female with history of Anxiety disorders; anxiety, unspecified [F41.9], Major Depressive Disorder, Recurrent, Moderate (F33.1), and Persistent insomnia with other symptoms [G47.00].       CHIEF COMPLAINT/REASON FOR ENCOUNTER: Pt's chief complaint includes the following:  sleep, and grief.    PSYCHIATRIC HISTORY:  Patient does not currently have a psychiatrist.    Patient does not currently have a therapist.     Pt is taking zolpidem (Ambien) 0.5mg once daily for sleep.  They are not interested in medication changes.  Previous Psychiatric Hospitalizations:  No  History of Trauma:  Heart transplant; CVA.    History of Violence:  No  Access to a gun/weapon:  No  Previous Suicide Attempts:  No    Risk assessment:  Patient reports no suicidal ideation  Patient reports no homicidal ideation  Patient reports no self-injurious behavior  Patient reports no violent behavior    PSYCHIATRIC FAMILY HISTORY:  Parents were alcoholic; SonMoy is an alcoholic.  Grandson has hx of depression      SOCIAL HISTORY (MARRIAGE, EMPLOYMENT, etc.):  Living Situation: Alone, at [facility].   Relationship status Single; never .   Children/Family: 2 sons. 1 sonFlakito, is .  SonMoy Jr lives in [place] with wife.  Estranged granddtrQiana.  Estranged grandsonMoy.  Cousin.   Supports:[employer] friends.   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  Christianity/Spirituality: Religious - Living [place] [employer]. Volunteers as a .   Hobbies and Interests: Crafting.     Current social stressors:   CVA 2024.  Loss of driving.CKD 4. Starting dialysis soon.  Hx of heart transplant in .  Transplanted heart now at max life expectancy.   Grandson's arrest in 2024; news coverage of his arrest.    Death of  "son, Flakito.  Years ago. Loss of contact with Flakito's daughter, Qiana (or Alda Kiran).       Current symptoms:  Depression: decreased appetite.  Anxiety: denies.  Insomnia:  chronic insomnia, now on 5mg ambien and temazapan. Still wakes up once per night. Years of dealing with this.  .  Astrid:  pressured speech.  Psychosis: denies .    MENTAL HEALTH STATUS EXAM  General Appearance:  unremarkable, age appropriate   Speech: normal pitch, normal volume, pressured, rapid      Level of Cooperation: cooperative      Thought Processes: normal and logical   Mood: steady      Thought Content: normal, no suicidality, no homicidality, delusions, or paranoia   Affect: congruent and appropriate   Orientation: Oriented x3   Memory: Appears WNL; patient not tested.    Attention Span & Concentration: intact   Fund of General Knowledge: intact and appropriate to age and level of education   Judgment & Insight: good   Language  intact     Session Content/Presenting Problem Hx:  November 2024: PHQ/LUPE: 13/15  February 2025 PHQ/LUPE:   Goal is to resume her active life.     4th session.  Patient discusses her need to stay busy. She acknowledges pushing herself to the point of exhaustion.  Describes spending hours cleaning, baking, crafting.  She plans parties at her skilled nursing community and handles all of the details herself.  Last year she experienced a stroke while decorating for her own 69th birthday party at the facility.  She has fallen off ladders while decorating late at night.    Patient says "I am running on fumes."  Describes a recent event where she did all of the planning, decorating and baking for a party; she fell the night before, while decorating the room at 3 a.m.  During the party she stood the entire time and she did not eat.  She refused to sit when others encouraged her to rest and to eat. She acknowledges actually stumbling while trying to dance at the party.  Patient appears to have little insight into her need " to push herself so hard.  She acknowledges being tired; she hopes to improve her sleep.  Says she has trouble sleeping soundly.   When asked why she pushes herself so hard, patient says she gets happiness from doing for others; she expresses feeling driven to keep going despite the risk of hurting herself.  Discussed her health journey. She will have her dialysis port placed soon; she expresses confidence in the plan to being dialysis.  She is happy with her new renal doctor. She feels very supported by her  and her Ep[employer] family; they have all prayed with her and she receives texts from her  and from [employer] members regularly.     Patient notes some recent improvement in her family relationships as well.  Talking with her son regularly; she is hopeful that he will visit soon.  She expresses concern about her grandson; he is losing his eyesight.  She worries that his mental health is not stable. She believes both her son and grandson have had episodes of depression. Her son has struggled with alcohol abuse and has lost jobs due to drinking. She believes he is doing better now.               Prior Session:   Patient is seen for 3rd session. She is walking slowly, using her cane; she says that her leg is numb and she has a lot of pain today.  Patient reports having had a serious conversation regarding goals of care with Palliative care NP.  She has made clear her desire to live as long as she can and to remain independent for as long as she can.  Patient appears to understand the gravity of her health conditions. She says she is having to take nitroglycerin more often for pains in her heart.  She appears accepting of the need for dialysis. She reports multiple areas of concern - back pain, ongoing issues with heart, liver, progression of kidney disease to Stage 5 and likely initiation of HD in near future.  Patient hopes to have another procedure soon to alleviate pain in her back.  Says the pain makes it  "very difficult for her to get a good night's sleep.  Patient talks about a very upsetting interaction she had with her renal doctor.  Says the appointment started off good, the doctor discussed HD but told her there was no hurry to begin the HD.  Patient then asked some questions at which point the doctor apparently became agitated and angry.  Patient was very upset by this interaction; says she is not as upset as she was at the time.  She expects to see a different renal doctor in the near future.    Patient enjoys keeping busy and helping others as a way of coping with her illnesses.  She shows LCSW numerous pictures of [place] decorations she made and [place] treats she made and gave to other people.  She has been able to go to [employer] 5 times recently and has enjoyed seeing fellow [employer] members. She is participating in an online Bible study group weekly.  Patient expresses her belief that doing for others helps her not think too much about her own difficulties.  She says "If I am living and helping others then I am happy."  She firmly believes in the power of prayer.  She is praying for her son and her grandson.  She is wearing a necklace that her son Moy gave her this [place].  Patient is encouraged to continue to devote time to her [employer] and to her spiritual life.  She expresses optimism and hope regarding her medical conditions.   She expresses gratefulness for the medical care she has received.  Support and encouragement provided to patient today.       STRENGTHS AND LIABILITIES: Strength: Patient is expressive/articulate., Strength: Patient is motivated for change., Strength: Patient has positive support network.    IMPRESSION:   My diagnostic impression is Anxiety disorders; anxiety, unspecified [F41.9], MAJOR DEPRESSIVE DISORDER, SINGLE EPISODE, Moderate (F32.1), and uncomp bereavement, as evidenced by patient's description of increased feelings of sadness and fear related to recent " hospitalization; feeling down at times, tearfulness, sadness from loss of son, grandson, granddaughter, family stressors, health stressors. She does not sleep well.     TREATMENT GOALS: Anxiety: reducing negative automatic thoughts, reducing physical symptoms of anxiety, and reducing time spent worrying (<30 minutes/day)  Depression: increasing self-reward for positive behaviors (one/day), increasing self-reward for positive thoughts (one/day), and reducing fatigue  Grief: process grief related to son's death, estrangement from a grandson and a granddaughter.     PLAN: In this session a psychosocial evaluation was conducted to get history and determine a treatment plan. CBT will be utilized in future individual  therapy sessions to increase interaction, insight, support, and behavior modification.     NEXT APPOINTMENT: 2/28/25

## 2025-02-13 NOTE — TELEPHONE ENCOUNTER
Pre op instructions reviewed with pt over telephone, verbalized understanding.    Procedure Date: 2/18/25  Arrival Time:  TBD; We will call you after 2pm the day before your procedure with your arrival time.    Address:   [facility] (Off [facility], 2nd Building on the left)  [id number] [place] [address] [place]. [address]  >>>Please enter through front entrance Lobby of 1st floor to Registration desk<<<      !!!INSTRUCTIONS IMPORTANT!!!  NO FOOD or tobacco products after midnight the night before surgery! You may have clear liquids up to 3 hrs before your arrival to the Hospital  Clear liquids include Gatorade, water, soda, black coffee or tea (no milk or creamer), and clear juices.  Clear liquids do NOT include anything with pulp or food particles (Chicken broth, ice cream, yogurt, Jello, etc.)    >>>MEDICATION INSTRUCTIONS<<<: Morning of Surgery, take small sip of water with ONLY these medications:  COREG  NIFEDIPINE  PANTOPRAZOLE  PREGABALIN    *ADHD Medication: Stop taking ADHD/ADD medications 48 hrs prior to surgery, as this can affect the anesthesia used. Bariatric surgeries must HOLD 7 Days prior to surgery!    *Diabetic/ Prediabetic Patients: !!!If you take diabetic or weight loss medication, Do NOT take morning of surgery unless instructed by Doctor!!!  Metformin to be stopped 24 hrs prior to surgery.   Long Acting Insulin Instructions: HOLD the night before surgery unless instructed differently by Provider!  Ozempic/ Mounjaro/ Wegovy/ Trulicity/ Semaglutide injections or weight loss medication to be stopped 7 days prior to surgery.    !!!STOP ALL Aspirins, NSAIDS, WEIGHT LOSS INJECTIONS/PILLS, Herbal supplements, & Vitamins 7 DAYS BEFORE SURGERY!!!    ____  Avoid Alcoholic beverages 3 days prior to surgery, as it can thin the blood.  ____  NO Acrylic/fake nails or nail polish worn day of surgery (specifically hand/arm & foot surgeries).  ____  NO powder, lotions, deodorants, oils or cream on  body.  ____  Remove all jewelry & piercings & foreign objects before arrival & leave at home.  ____  Remove Dentures, Hearing Aids & Contact Lens prior to surgery.  ____  Bring photo ID and insurance information to hospital (Leave Valuables at Home).  ____  If going home the same day, arrange for a ride home. You will not be able to drive for 24 hrs if Anesthesia was used.   ____  Females (ages 11-60): may need to give a urine sample the morning of surgery; please see Pre op Nurse prior to using the restroom.  ____  Males: Stop ED medications (Viagra, Cialis) 24 hrs prior to surgery.  ____  Wear clean, loose fitting clothing to allow for dressings/ bandages.      Bathing Instructions:    -Shower with anti-bacterial Soap (Hibiclens or Dial) the night before surgery and the morning of.   -Do not use Hibiclens on your face or genitals.   -Apply clean clothes after shower.  -Do not shave your face or body 2 days prior to surgery unless instructed otherwise by your Surgeon.  -Do not shave pubic hair 7 days prior to surgery (gyn pt's).    [facility] Visitor/Ride Policy:  Only 2 adults allowed in pre op/recovery area during your procedure. You MUST HAVE A RIDE HOME from a responsible adult that you know and trust. Medical Transport, Uber or Lyft can ONLY be used if patient has a responsible adult to accompany them during ride home.       *Signs and symptoms of Infection Before or After Surgery:               !!!If you experience any fever, chills, nausea/ vomiting, foul odor/ excessive drainage from surgical site, flu-like symptoms, new wounds or cuts, PLEASE CALL THE SURGEON OFFICE at [phone] or SEND MESSAGE THROUGH [employer]eting PORTAL!!!     *If you are running late the morning of surgery, please call the Hospital Surgery Dept @ [phone].     *Billing questions:  [phone]     Thank you,  -[facility] Pre Admit Dept.  [phone] or [phone]  M-F 7:30 am-4:00 pm (Closed Major Holidays)

## 2025-02-14 ENCOUNTER — CLINICAL SUPPORT (OUTPATIENT)
Dept: PRIMARY CARE CLINIC | Facility: CLINIC | Age: 70
End: 2025-02-14
Payer: MEDICARE

## 2025-02-14 DIAGNOSIS — F41.8 DEPRESSION WITH ANXIETY: Primary | ICD-10-CM

## 2025-02-14 PROCEDURE — 99499 UNLISTED E&M SERVICE: CPT | Mod: HCNC,S$GLB,,

## 2025-02-14 PROCEDURE — 99999 PR PBB SHADOW E&M-EST. PATIENT-LVL I: CPT | Mod: PBBFAC,HCNC,,

## 2025-02-16 PROBLEM — Z00.00 ENCOUNTER FOR PREVENTIVE HEALTH EXAMINATION: Status: RESOLVED | Noted: 2024-05-06 | Resolved: 2025-02-16

## 2025-02-16 PROBLEM — Z51.5 ENCOUNTER FOR PALLIATIVE CARE: Status: RESOLVED | Noted: 2024-10-14 | Resolved: 2025-02-16

## 2025-02-17 NOTE — PRE-PROCEDURE INSTRUCTIONS
Called and spoke with Nadia about the following:     Please arrive to [facility] ([facility] at 0800 on 2/18/25 for your scheduled procedure.  Address: [address] [place] [address] [place]. [address] (2nd Building on left, 1st Floor Lobby)    !!!NO FOOD after midnight! You may have clear liquids up to 3 hrs before your arrival to the Hospital!!!  Clear liquids include Gatorade, water, soda, black coffee or tea (no milk or creamer), and clear juices.  Clear liquids do NOT include anything with pulp or food particles (Chicken broth, ice cream, yogurt, Jello, etc.)    Thank you,  -[facility] Pre [facility]mit Dept.  Mon-Fri 7:30 am - 4 pm [phone]

## 2025-02-18 ENCOUNTER — TELEPHONE (OUTPATIENT)
Dept: UROLOGY | Facility: CLINIC | Age: 70
End: 2025-02-18
Payer: MEDICARE

## 2025-02-18 ENCOUNTER — ANESTHESIA (OUTPATIENT)
Dept: SURGERY | Facility: HOSPITAL | Age: 70
End: 2025-02-18
Payer: MEDICARE

## 2025-02-18 ENCOUNTER — HOSPITAL ENCOUNTER (OUTPATIENT)
Facility: HOSPITAL | Age: 70
Discharge: HOME | End: 2025-02-18
Attending: UROLOGY | Admitting: UROLOGY
Payer: MEDICARE

## 2025-02-18 ENCOUNTER — ANESTHESIA EVENT (OUTPATIENT)
Dept: SURGERY | Facility: HOSPITAL | Age: 70
End: 2025-02-18
Payer: MEDICARE

## 2025-02-18 VITALS
SYSTOLIC BLOOD PRESSURE: 116 MMHG | HEART RATE: 81 BPM | OXYGEN SATURATION: 99 % | TEMPERATURE: 98 F | RESPIRATION RATE: 18 BRPM | DIASTOLIC BLOOD PRESSURE: 90 MMHG | WEIGHT: 147.25 LBS | BODY MASS INDEX: 27.1 KG/M2 | HEIGHT: 62 IN

## 2025-02-18 DIAGNOSIS — N39.41 URGE INCONTINENCE OF URINE: ICD-10-CM

## 2025-02-18 PROCEDURE — 36000707: Mod: HCNC | Performed by: UROLOGY

## 2025-02-18 PROCEDURE — 71000039 HC RECOVERY, EACH ADD'L HOUR: Mod: HCNC | Performed by: UROLOGY

## 2025-02-18 PROCEDURE — 63600175 PHARM REV CODE 636 W HCPCS: Mod: JZ,TB,HCNC | Performed by: STUDENT IN AN ORGANIZED HEALTH CARE EDUCATION/TRAINING PROGRAM

## 2025-02-18 PROCEDURE — 25000003 PHARM REV CODE 250: Mod: HCNC

## 2025-02-18 PROCEDURE — 71000033 HC RECOVERY, INTIAL HOUR: Mod: HCNC | Performed by: UROLOGY

## 2025-02-18 PROCEDURE — 63600175 PHARM REV CODE 636 W HCPCS: Mod: HCNC

## 2025-02-18 PROCEDURE — 71000015 HC POSTOP RECOV 1ST HR: Mod: HCNC | Performed by: UROLOGY

## 2025-02-18 PROCEDURE — 37000008 HC ANESTHESIA 1ST 15 MINUTES: Mod: HCNC | Performed by: UROLOGY

## 2025-02-18 PROCEDURE — 37000009 HC ANESTHESIA EA ADD 15 MINS: Mod: HCNC | Performed by: UROLOGY

## 2025-02-18 PROCEDURE — 63600175 PHARM REV CODE 636 W HCPCS: Mod: HCNC | Performed by: UROLOGY

## 2025-02-18 PROCEDURE — 36000706: Mod: HCNC | Performed by: UROLOGY

## 2025-02-18 RX ORDER — SUCCINYLCHOLINE CHLORIDE 20 MG/ML
INJECTION INTRAMUSCULAR; INTRAVENOUS
Status: DISCONTINUED | OUTPATIENT
Start: 2025-02-18 | End: 2025-02-18

## 2025-02-18 RX ORDER — LIDOCAINE HYDROCHLORIDE 20 MG/ML
INJECTION INTRAVENOUS
Status: DISCONTINUED | OUTPATIENT
Start: 2025-02-18 | End: 2025-02-18

## 2025-02-18 RX ORDER — GLUCAGON 1 MG
1 KIT INJECTION
Status: DISCONTINUED | OUTPATIENT
Start: 2025-02-18 | End: 2025-02-18 | Stop reason: HOSPADM

## 2025-02-18 RX ORDER — OXYCODONE HYDROCHLORIDE 5 MG/1
5 TABLET ORAL
Status: DISCONTINUED | OUTPATIENT
Start: 2025-02-18 | End: 2025-02-18 | Stop reason: HOSPADM

## 2025-02-18 RX ORDER — CLINDAMYCIN PHOSPHATE 900 MG/50ML
900 INJECTION, SOLUTION INTRAVENOUS
Status: COMPLETED | OUTPATIENT
Start: 2025-02-18 | End: 2025-02-18

## 2025-02-18 RX ORDER — ONDANSETRON HYDROCHLORIDE 2 MG/ML
INJECTION, SOLUTION INTRAVENOUS
Status: DISCONTINUED | OUTPATIENT
Start: 2025-02-18 | End: 2025-02-18

## 2025-02-18 RX ORDER — ROCURONIUM BROMIDE 10 MG/ML
INJECTION, SOLUTION INTRAVENOUS
Status: DISCONTINUED | OUTPATIENT
Start: 2025-02-18 | End: 2025-02-18

## 2025-02-18 RX ORDER — ONDANSETRON HYDROCHLORIDE 2 MG/ML
4 INJECTION, SOLUTION INTRAVENOUS DAILY PRN
Status: DISCONTINUED | OUTPATIENT
Start: 2025-02-18 | End: 2025-02-18 | Stop reason: HOSPADM

## 2025-02-18 RX ORDER — PROCHLORPERAZINE EDISYLATE 5 MG/ML
5 INJECTION INTRAMUSCULAR; INTRAVENOUS EVERY 6 HOURS PRN
Status: DISCONTINUED | OUTPATIENT
Start: 2025-02-18 | End: 2025-02-18 | Stop reason: HOSPADM

## 2025-02-18 RX ORDER — PROPOFOL 10 MG/ML
VIAL (ML) INTRAVENOUS
Status: DISCONTINUED | OUTPATIENT
Start: 2025-02-18 | End: 2025-02-18

## 2025-02-18 RX ORDER — OXYCODONE AND ACETAMINOPHEN 5; 325 MG/1; MG/1
1 TABLET ORAL EVERY 4 HOURS PRN
Qty: 25 TABLET | Refills: 0 | Status: SHIPPED | OUTPATIENT
Start: 2025-02-18

## 2025-02-18 RX ORDER — HYDROMORPHONE HYDROCHLORIDE 2 MG/ML
0.2 INJECTION, SOLUTION INTRAMUSCULAR; INTRAVENOUS; SUBCUTANEOUS EVERY 5 MIN PRN
Status: DISCONTINUED | OUTPATIENT
Start: 2025-02-18 | End: 2025-02-18 | Stop reason: HOSPADM

## 2025-02-18 RX ORDER — MIDAZOLAM HYDROCHLORIDE 1 MG/ML
INJECTION INTRAMUSCULAR; INTRAVENOUS
Status: DISCONTINUED | OUTPATIENT
Start: 2025-02-18 | End: 2025-02-18

## 2025-02-18 RX ORDER — FENTANYL CITRATE 50 UG/ML
INJECTION, SOLUTION INTRAMUSCULAR; INTRAVENOUS
Status: DISCONTINUED | OUTPATIENT
Start: 2025-02-18 | End: 2025-02-18

## 2025-02-18 RX ADMIN — HYDROMORPHONE HYDROCHLORIDE 0.2 MG: 2 INJECTION INTRAMUSCULAR; INTRAVENOUS; SUBCUTANEOUS at 11:02

## 2025-02-18 RX ADMIN — LIDOCAINE HYDROCHLORIDE 50 MG: 20 INJECTION INTRAVENOUS at 10:02

## 2025-02-18 RX ADMIN — ONDANSETRON 4 MG: 2 INJECTION INTRAMUSCULAR; INTRAVENOUS at 10:02

## 2025-02-18 RX ADMIN — FENTANYL CITRATE 50 MCG: 50 INJECTION, SOLUTION INTRAMUSCULAR; INTRAVENOUS at 10:02

## 2025-02-18 RX ADMIN — SODIUM CHLORIDE: 9 INJECTION, SOLUTION INTRAVENOUS at 10:02

## 2025-02-18 RX ADMIN — PROCHLORPERAZINE EDISYLATE 5 MG: 5 INJECTION INTRAMUSCULAR; INTRAVENOUS at 11:02

## 2025-02-18 RX ADMIN — SUCCINYLCHOLINE CHLORIDE 120 MG: 20 INJECTION, SOLUTION INTRAMUSCULAR; INTRAVENOUS; PARENTERAL at 10:02

## 2025-02-18 RX ADMIN — PROPOFOL 120 MG: 10 INJECTION, EMULSION INTRAVENOUS at 10:02

## 2025-02-18 RX ADMIN — ROCURONIUM BROMIDE 5 MG: 10 SOLUTION INTRAVENOUS at 10:02

## 2025-02-18 RX ADMIN — SUGAMMADEX 200 MG: 100 INJECTION, SOLUTION INTRAVENOUS at 10:02

## 2025-02-18 RX ADMIN — MIDAZOLAM HYDROCHLORIDE 2 MG: 1 INJECTION, SOLUTION INTRAMUSCULAR; INTRAVENOUS at 10:02

## 2025-02-18 RX ADMIN — CLINDAMYCIN PHOSPHATE 900 MG: 900 INJECTION, SOLUTION INTRAVENOUS at 10:02

## 2025-02-18 NOTE — OP NOTE
Date of Procedure: 02/18/2025      PREOPERATIVE DIAGNOSIS:  Urge incontinence, desire for device removal     POSTOPERATIVE DIAGNOSIS:  Urge incontinence, desire for device removal     PROCEDURES:  InterStim removal     SURGEON:  Sami Cochran M.D.     Assistants: None     Specimen:  None     ANESTHESIA:  local MAC anesthesia.     BLOOD LOSS:  Minimal     FINDINGS:  Device removed, no evidence of infection.     PROCEDURE IN DETAIL:  Patient was brought to the operating suite where they were placed under general anesthesia.  Patient was then positioned in the prone position, sterilely prepped and draped.  The old incision site was opened with a 15 blade scapel after local anesthesia was applied.  We were then able to dissect down to the device and remove it from the surgical site.  Traction was applied to the lead and it was removed from the surgical site.  All components removed in total.  Antibiotic irrigation was used to irrigate the wound, no definitive signs of infection though.  We then closed the deep layer with 2 0 Vicryl in a running fashion.  The skin was closed with 5 0 Biosyn in a subcuticular fashion.  Dermabond was applied as coverage and the patient was awoken and taken to the PACU in stable condition.  She will return for wound evaluation in 6 weeks.    COMPLICATIONS:  None

## 2025-02-18 NOTE — ANESTHESIA PROCEDURE NOTES
Intubation    Date/Time: 2/18/2025 10:32 AM    Performed by: Francisco J Smith CRNA  Authorized by: David Peterson MD    Intubation:     Induction:  Rapid sequence induction    Intubated:  Postinduction    Mask Ventilation:  Not attempted    Attempts:  1    Attempted By:  CRNA    Method of Intubation:  Direct    Blade:  Shahla 3    Laryngeal View Grade: Grade I - full view of cords      Difficult Airway Encountered?: No      Complications:  None    Airway Device:  Oral endotracheal tube    Airway Device Size:  7.0    Style/Cuff Inflation:  Cuffed (inflated to minimal occlusive pressure)    Tube secured:  20    Secured at:  The lips    Placement Verified By:  Capnometry    Complicating Factors:  None    Findings Post-Intubation:  BS equal bilateral

## 2025-02-18 NOTE — H&P
Chief Complaint:        Encounter Diagnoses   Name Primary?    Urge incontinence of urine Yes    FRANKY (stress urinary incontinence, female)      Acute cystitis without hematuria      Prolapse of vaginal cuff after hysterectomy              HPI:   1/17/25- leakage is improved with the InterStim but she has had persistent back pain and believes that the InterStim is causing this pain.  No real problems with stress incontinence.     5/1/24- patient is here today for the 1st time to see me from [place], she is here today to discuss surgical management for urge incontinence as she has failed medical management.  No specific evidence of stress incontinence or UTI.     11/29/23- LR- Patient is a 68-year-old female that is presenting as a follow-up to overactive bladder.  Patient has urge incontinence and is wearing a pad that she changes several times a day.  She was prescribed Myrbetriq, however, was hospitalized secondary to an increase in potassium.  Patient reports that primary care provider discontinued Myrbetriq secondary to possible interaction related to increase potassium level.  Patient states that medication decreasing her urge incontinence and daytime frequency.  Is requesting a new medication or treatment option.         Allergies:  Lisinopril, Augmentin [amoxicillin-pot clavulanate], and Zyvox [linezolid]     Medications:  has a current medication list which includes the following prescription(s): albuterol, benzonatate, biotin, calcitonin (salmon), carvedilol, cyclosporine modified (neoral), diazepam, ergocalciferol, evening primrose oil, fluticasone propionate, furosemide, hydralazine, hydrocortisone, lidocaine, multivitamin, nifedipine, pantoprazole, patiromer calcium sorbitex, polyethylene glycol, pregabalin, psyllium husk, retacrit, temazepam, UNABLE TO FIND, vitamin e, and zolpidem, and the following Facility-Administered Medications: acetaminophen and famotidine.     Review of Systems:  General: No  fever, chills, fatigability, or weight loss.  Skin: No rashes, itching, or changes in color or texture of skin.  Chest: Denies DONOHUE, cyanosis, wheezing, cough, and sputum production.  Abdomen: Appetite fine. No weight loss. Denies diarrhea, abdominal pain, hematemesis, or blood in stool.  Musculoskeletal: No joint stiffness or swelling. Denies back pain.  : As above.  All other review of systems negative.     PMH:   has a past medical history of Abdominal wall hernia, Abnormal mammogram (10/12/2021), GRACE (acute kidney injury) (11/22/2021), Anxiety, Arthritis, Breast cancer in female (08/2021), Bronchitis (08/18/2016), C. difficile colitis (11/29/2021), Cellulitis of axilla, left (12/23/2021), Chronic diastolic heart failure (12/16/2021), Chronic kidney disease, Chronic midline low back pain without sciatica (06/18/2018), Closed nondisplaced fracture of distal phalanx of left great toe with routine healing (10/22/2018), Coronary artery disease (1993), COVID-19 in immunocompromised patient (02/26/2024), Cystitis (05/10/2022), Depression, Encounter for blood transfusion, Fibromyalgia, Heart failure, Heart transplanted (1993), History of hyperparathyroidism; Hyperparathyroidism, secondary renal, Hypertension, Immune disorder, 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     PSH:   has a past surgical history that includes Cardiac pacemaker removal (Left, 06/26/2014); Bladder surgery  (2015 approx); Carpal tunnel release (Left, 03/03/2015); Hysterectomy (1983); Hernia repair (Right, 1971 approx); Breast surgery (Left, 09/28/2015); Breast surgery (Right, 12/2015); Toe Surgery; Colonoscopy (N/A, 02/25/2021); Breast biopsy (Bilateral); Heart transplant (1993); [place] lymph node biopsy (Left, 10/12/2021); Insertion of tunneled central venous catheter (CVC) with subcutaneous port (N/A, 11/09/2021); Incision and drainage of abscess (Left, 12/24/2021); Removal of vascular access port; Breast lumpectomy (Left, 2021); Injection of anesthetic agent into sacroiliac joint (Right, 08/22/2022); Injection of anesthetic agent around medial branch nerves innervating lumbar facet joint (Right, 10/19/2022); Injection of anesthetic agent around medial branch nerves innervating lumbar facet joint (Right, 11/09/2022); Breast biopsy (Right, 10/31/2022); Radiofrequency thermocoagulation (Right, 12/07/2022); Epidural steroid injection into cervical spine (N/A, 02/02/2023); Cystocele repair; Robot-assisted laparoscopic abdominal sacrocolpopexy (N/A, 8/10/2023); xi robotic urethropexy (N/A, 8/10/2023); and Robot-assisted laparoscopic oophorectomy (Right, 8/10/2023).     FamHx: family history includes Breast cancer in her maternal grandmother and mother; Cancer (age of onset: 38) in her mother; Cataracts in her cousin; Heart disease in her maternal grandmother; Hypertension in her son.     SocHx:  reports that she has never smoked. She has never been exposed to tobacco smoke. She has never used smokeless tobacco. She reports that she does not drink alcohol and does not use drugs.       Physical Exam:  There were no vitals filed for this visit.  General: A&Ox3, no apparent distress, no deformities  Neck: No masses, normal ROM  Lungs: normal inspiration, no use of accessory muscles  Heart: normal pulse, no arrhythmias  Abdomen: Soft, NT, ND, incision is well healed  Skin: The skin is warm and dry. No jaundice.  Ext: No  c/c/e.     Labs/Studies:   UA small blood, 100 protein 1/25  PNE 5/22/24     Impression/Plan:        1. Urge incontinence-  InterStim  6/11/24     Patient has done well following the InterStim but she now believes that some of her neuropathic pain is secondary to the InterStim, she notes the pain at the lead site but not the device site.  She is adamant that she would like the InterStim removed, understanding that her incontinence will become worse.  Therefore will proceed with InterStim removal, please see below in regards to our discussion today, call with any other issues prior to the next appointment.  Due to significant cardiac history, will need to be cleared prior to surgery.     2. Stress incontinence-  robotic sacrocolpopexy, urethropexy, right oophorectomy OBGYN  8/10/23     No evidence of stress incontinence, nothing further at this juncture.     3. UTI- no evidence of UTI, call with any issues.  Of note no gross hematuria, no history of smoking.     Patient understands the risks, benefits and alternatives of the above-stated procedure.  Patient understands that these can include, but are not limited to damage to the surrounding structures including the subcutaneous tissue and neurological structures.  Risk of leaving a portion of the device behind, possibly within the spinal column which cannot be removed.  Patient understands that her leakage may worsen.  Risk of persistent pain or discomfort, hematoma, wound breakdown, or possible infection of the wound.  Risk of heart attack, stroke, death, DVT and PE.  Patient understanding of all the above has elected to pursue the procedure as stated.

## 2025-02-18 NOTE — DISCHARGE SUMMARY
[facility] - [facility] (Hospital)  Discharge Note  Short Stay    Procedure(s) (LRB):  REMOVAL, ELECTRODE LEAD, SACRAL NERVE STIMULATOR (N/A)      OUTCOME: Patient tolerated treatment/procedure well without complication and is now ready for discharge.    DISPOSITION: Home or Self Care    FINAL DIAGNOSIS:  interstim discomfort    FOLLOWUP: In clinic    DISCHARGE INSTRUCTIONS:    Discharge Procedure Orders   Diet Adult Regular     Notify your health care provider if you experience any of the following:  redness, tenderness, or signs of infection (pain, swelling, redness, odor or green/yellow discharge around incision site)     Notify your health care provider if you experience any of the following:  severe uncontrolled pain     Notify your health care provider if you experience any of the following:  persistent nausea and vomiting or diarrhea     Notify your health care provider if you experience any of the following:  temperature >100.4     No dressing needed   Order Comments: Keep dry for 36 hours, but do not submerge for one month     Activity as tolerated        TIME SPENT ON DISCHARGE: 5 minutes

## 2025-02-18 NOTE — ANESTHESIA PREPROCEDURE EVALUATION
"                                                                                                             02/18/2025  Nadia Damon is a 69 y.o., female     *See below for recent progress note via Dr. Tubbs (Transplant):  "Patient is at mod-high risk for general anesthesia of n setting of most recent echo and heart transoms transplant."    Patient Active Problem List   Diagnosis    Heart transplanted    Primary insomnia    Fibromyalgia    Gastroesophageal reflux disease without esophagitis    Essential hypertension    Aortic atherosclerosis    Secondary hyperparathyroidism, renal    Other osteoporosis without current pathological fracture    Immunocompromised patient    Ductal carcinoma in situ (DCIS) of left breast    Immunocompromised state due to drug therapy    Ulnar neuropathy of left upper extremity    Chronic diastolic (congestive) heart failure    Anemia associated with stage 5 chronic renal failure    Secondary and unspecified malignant neoplasm of axilla and upper limb lymph nodes    Chest pain    Acute cystitis without hematuria    Borderline abnormal TFTs    Other hyperlipidemia    DDD (degenerative disc disease), thoracolumbar    Ventral hernia without obstruction or gangrene    Abnormal serum thyroid stimulating hormone (TSH) level    FRANKY (stress urinary incontinence, female)    Prolapse of vaginal cuff after hysterectomy    Pulmonary heart disease    Urge incontinence of urine    Orthopedic device, implant, or graft complication    Bilateral hip pain    Chronic idiopathic constipation    Spinal stenosis of lumbosacral region    Patent foramen ovale    Chronic diastolic congestive heart failure    Primary osteoarthritis of right shoulder    Prolonged Q-T interval on ECG    CKD (chronic kidney disease) stage 5, GFR less than 15 ml/min    Chronic right-sided thoracic back pain    Hemiparesis affecting right side as late effect of cerebrovascular accident (CVA)    Angina of effort    Rapid " palpitations    Spinal stenosis    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    Lymphedema of left arm    Chronic venous insufficiency    Lower extremity edema    Fall     Past Medical History:   Diagnosis Date    Abdominal wall hernia     CT Renal 6/11/2018---Small fat containing superior ventral abdominal wall hernia at the epicardial pacing lead site.    Abnormal mammogram 10/12/2021    Acute cystitis without hematuria 05/10/2022    Acute ischemic right middle cerebral artery (MCA) stroke 07/20/2024    Adverse drug reaction 11/12/2024    GRACE (acute kidney injury) 11/22/2021    Anxiety     Aphasia 07/19/2024    Arthritis     ZEN HIPS    Breast cancer in female 08/2021    LEFT BREAST    Breast mass, right 11/13/2024    RIGHT BREAST MASS (Problem)      Bronchitis 08/18/2016    Never smoked      C. difficile colitis 11/29/2021    Cellulitis of axilla, left 12/23/2021    Chronic diastolic heart failure 12/16/2021    Chronic kidney disease     stage 4, GFR 15-29 ml/min    Chronic midline low back pain without sciatica 06/18/2018    CKD (chronic kidney disease) stage 4, GFR 15-29 ml/min 04/20/2016    US Retro   5/16/2018---Mild cortical thinning and increased cortical echogenicity.  Findings can be seen with medical renal disease.  8/31/216--- Echogenic kidneys with diffuse cortical thinning suggesting  medical renal disease. 2 complex right renal cortical cysts.      Closed nondisplaced fracture of distal phalanx of left great toe with routine healing 10/22/2018    Coronary artery disease 1993    heart transplant    COVID-19 in immunocompromised patient 02/26/2024    Cystitis 05/10/2022    Depression     Encounter for blood transfusion     Encounter for palliative care 10/14/2024    Fibromyalgia     on Lyrica    Heart failure     native heart cardiomyopathy    Heart transplanted 1993    due to cardiomyopathy    History of hyperparathyroidism; Hyperparathyroidism, secondary renal     PT  DENIES    Hypertension     Immune disorder     anti rejection meds    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    Impaired mobility 07/28/2022    Iron deficiency anemia 08/15/2017    Kidney stones     passed per pt    Obesity     Obesity (BMI 30.0-34.9) 07/22/2019    Other osteoporosis without current pathological fracture 08/30/2019    Other pancytopenia 05/06/2024    Parotitis, acute 09/19/2023    Pharyngitis 01/09/2019    Pneumonia due to infectious organism 03/14/2024    PONV (postoperative nausea and vomiting)     Severe sepsis 11/22/2021    Shingles 2003 approx    left leg    Stage 4 chronic kidney disease 11/22/2021    Subclinical hypothyroidism 06/16/2023    Thrombocytopenia, unspecified 11/29/2021    Trouble in sleeping     Unresponsiveness 11/13/2024    Urinary incontinence      Past Surgical History:   Procedure Laterality Date    bladder implant Right 06/11/2024    BLADDER SURGERY  2015 approx    mesh - Dr Everett then 2nd reconstructive sx Dr Onofre    BREAST BIOPSY Bilateral     NEGATIVE    BREAST BIOPSY Right 10/31/2022    benign    BREAST LUMPECTOMY Left 2021    BREAST SURGERY Left 09/28/2015    Bx - benign    BREAST SURGERY Right 12/2015    Bx benign    CARDIAC PACEMAKER REMOVAL Left 06/26/2014    Pacer defirillator removed. Put in 1993 aat time of heart transplant    CARPAL TUNNEL RELEASE Left 03/03/2015    Dr. Hall    COLONOSCOPY N/A 02/25/2021    Procedure: COLONOSCOPY;  Surgeon: Freida Ramirez MD;  Location: [facility];  Service: Endoscopy;  Laterality: N/A;    CYSTOCELE REPAIR      Twice with mesh removal    EPIDURAL STEROID INJECTION INTO CERVICAL SPINE N/A 02/02/2023    Procedure: T11/T12 IL HELLEN;  Surgeon: Jassi Pierre MD;  Location: [facility]IN [facility]GT;  Service: Pain Management;  Laterality: N/A;    EPIDURAL STEROID INJECTION INTO CERVICAL SPINE N/A 9/16/2024    Procedure: T11/T12 IL HELLEN;  Surgeon: Jassi Pierre MD;  Location: [facility];  Service: Pain  Management;  Laterality: N/A;    HEART TRANSPLANT  1993    HERNIA REPAIR Right 1971 approx    Inguinal    HYSTERECTOMY  1983    vag hyst /LSO     IMPLANTATION OF PERMANENT SACRAL NERVE STIMULATOR N/A 06/11/2024    Procedure: INSERTION, NEUROSTIMULATOR, PERMANENT, SACRAL;  Surgeon: Sami Cochran MD;  Location: [facility] OR;  Service: Urology;  Laterality: N/A;    INCISION AND DRAINAGE OF ABSCESS Left 12/24/2021    Procedure: INCISION AND DRAINAGE, ABSCESS;  Surgeon: Joseph Longo MD;  Location: [facility] OR;  Service: General;  Laterality: Left;    INJECTION OF ANESTHETIC AGENT AROUND MEDIAL BRANCH NERVES INNERVATING LUMBAR FACET JOINT Right 10/19/2022    Procedure: Right L4/L5 and L5/S1 MBB;  Surgeon: Jassi Pierre MD;  Location: [facility];  Service: Pain Management;  Laterality: Right;    INJECTION OF ANESTHETIC AGENT AROUND MEDIAL BRANCH NERVES INNERVATING LUMBAR FACET JOINT Right 11/09/2022    Procedure: Right L4/L5 and L5/S1 MBB;  Surgeon: Jassi Pierre MD;  Location: [facility];  Service: Pain Management;  Laterality: Right;    INJECTION OF ANESTHETIC AGENT AROUND MEDIAL BRANCH NERVES INNERVATING LUMBAR FACET JOINT Left 2/6/2025    Procedure: Left L4-5 and L5-S1 MBB #1 with local;  Surgeon: Jassi Pierre MD;  Location: [facility];  Service: Pain Management;  Laterality: Left;    INJECTION OF ANESTHETIC AGENT INTO SACROILIAC JOINT Right 08/22/2022    Procedure: Right SIJ Injection Right L5/S1 Facte Injection;  Surgeon: Jassi Pierre MD;  Location: [facility];  Service: Pain Management;  Laterality: Right;    INSERTION OF TUNNELED CENTRAL VENOUS CATHETER (CVC) WITH SUBCUTANEOUS PORT N/A 11/09/2021    Procedure: [id number];  Surgeon: Christoph Douglas MD;  Location: [facility] OR;  Service: General;  Laterality: N/A;    RADIOFREQUENCY ABLATION Right 12/5/2024    Procedure: Right L4-5 and L5-S1 RFA;  Surgeon: Jassi Pierre MD;  Location: [facility];  Service: Pain  Management;  Laterality: Right;    RADIOFREQUENCY THERMOCOAGULATION Right 12/07/2022    Procedure: Right L4/L5 and L5/S1 Lumbar RFA;  Surgeon: Jassi Pierre MD;  Location: [facility];  Service: Pain Management;  Laterality: Right;    REMOVAL OF VASCULAR ACCESS PORT      ROBOT-ASSISTED LAPAROSCOPIC ABDOMINAL SACROCOLPOPEXY N/A 08/10/2023    Procedure: ROBOTIC SACROCOLPOPEXY, ABDOMEN;  Surgeon: PRANAY Villalobos MD;  Location: [facility] OR;  Service: OB/GYN;  Laterality: N/A;    ROBOT-ASSISTED LAPAROSCOPIC OOPHORECTOMY Right 08/10/2023    Procedure: ROBOTIC OOPHORECTOMY;  Surgeon: PRANAY Villalobos MD;  Location: [facility] OR;  Service: OB/GYN;  Laterality: Right;    SENTINEL LYMPH NODE BIOPSY Left 10/12/2021    Procedure: BIOPSY, LYMPH NODE, SENTINEL;  Surgeon: Christoph Douglas MD;  Location: [facility];  Service: General;  Laterality: Left;    TOE SURGERY      XI ROBOTIC URETHROPEXY N/A 08/10/2023    Procedure: XI ROBOTIC URETHROPEXY;  Surgeon: PRANAY Villalobos MD;  Location: [facility];  Service: OB/GYN;  Laterality: N/A;     **Cleared by Cards for this procedure (Dr. Hawkins): see below  Reviewed PET scan and dobutamine stress test normal last year.    Most recent echocardiogram with EF of 55-60%.    Chronic lower extremity swelling but also has CKD.  Okay to proceed from cardiac standpoint with her neurostimulator removal.    Increase BP medications.    On immunosuppressants.  Followed by [facility].  Reviewed all tests and above medical conditions with patient in detail and formulated treatment plan.  Risk factor modification discussed.   Cardiac low salt diet discussed.  Maintaining healthy weight and weight loss goals were discussed in clinic.    Reviewed PET scan and dobutamine stress test normal last year.    Most recent echocardiogram with EF of 55-60%.    Chronic lower extremity swelling but also has CKD.  Okay to proceed from cardiac standpoint with her neurostimulator removal.    Increase BP medications.     On immunosuppressants.  Followed by [facility].  Reviewed all tests and above medical conditions with patient in detail and formulated treatment plan.  Risk factor modification discussed.   Cardiac low salt diet discussed.  Maintaining healthy weight and weight loss goals were discussed in clinic.    ECHO: (5/15/24)  Summary         Left Ventricle: The left ventricle is normal in size. Normal wall thickness. There is mild asymmetric hypertrophy. Normal wall motion. Septal flattening in systole consistent with right ventricular pressure overload. There is normal systolic function with a visually estimated ejection fraction of 55 - 60%. There is diastolic dysfunction but grade cannot be determined.    Right Ventricle: Moderate right ventricular enlargement. Wall thickness is normal. Systolic function is normal.    Left Atrium: Left atrium is severely dilated.    Right Atrium: Right atrium is moderately dilated.    Tricuspid Valve: There is moderate regurgitation.    IVC/SVC: Intermediate venous pressure at 8 mmHg.    Pre-op Assessment    I have reviewed the Patient Summary Reports.    I have reviewed the NPO Status.   I have reviewed the Medications.     Review of Systems  Anesthesia Hx:  No problems with previous Anesthesia   History of prior surgery of interest to airway management or planning:  Previous anesthesia: General          Denies Personal Hx of Anesthesia complications.                    Social:  Non-Smoker, No Alcohol Use       Hematology/Oncology:    Oncology Normal    -- Anemia:                                  Cardiovascular:    Pacemaker Hypertension   CAD      Angina (relieved with sublingual Nitro) CHF       ECG has been reviewed. Normal sinus rhythm   Right bundle branch block   Left anterior fascicular block    Bifascicular block   T wave abnormality, consider inferolateral ischemia   Abnormal ECG   When compared with ECG of 15-NOV-2023 15:23,   T wave inversion more evident in Inferior leads    Confirmed by CURT BAKER MD ([id number]) on 12/26/2023 3:40:26 PM     *S/p heart transplant                           Pulmonary:  Pneumonia       Pulm HTN - 44 mHg               Renal/:  Chronic Renal Disease, CKD, ARF, ESRD   CKD-5; will be starting HD soon     BUN 53  Cre 4.1             Hepatic/GI:     GERD   Elevated LFTs             Musculoskeletal:  Arthritis   Diffuse bilateral LE edema        Spine Disorders:  Degenerative disease, Disc disease and Chronic Pain           Neurological:   CVA Neuromuscular Disease,       Fibromyalgia    CVA 2024                            Endocrine:   Hypothyroidism  BMI 29      Obesity / BMI > 30  Psych:    depression                Chemistry        Component Value Date/Time     02/07/2025 1637    K 4.0 02/07/2025 1637     02/07/2025 1637    CO2 24 02/07/2025 1637    BUN 53 (H) 02/07/2025 1637    CREATININE 4.1 (H) 02/07/2025 1637    GLU 86 02/07/2025 1637        Component Value Date/Time    CALCIUM 9.3 02/07/2025 1637    ALKPHOS 96 01/19/2025 1625     (H) 01/19/2025 1625    ALT 57 (H) 01/19/2025 1625    BILITOT 0.5 01/19/2025 1625    ESTGFRAFRICA 19 (A) 07/14/2022 1100    EGFRNONAA 17 (A) 07/14/2022 1100        Lab Results   Component Value Date    WBC 3.41 (L) 01/19/2025    HGB 9.6 (L) 01/19/2025    HCT 30.1 (L) 01/19/2025    MCV 92 01/19/2025     01/19/2025           Physical Exam  General: Well nourished, Cooperative, Alert and Oriented    Airway:  Mallampati: II   Mouth Opening: Normal  TM Distance: Normal  Tongue: Normal  Neck ROM: Normal ROM    Dental:  Partial Dentures, Dentures  Upper and lower partials (will remove); denies any loose teeth  Musculoskeletal:  2+ pitting edema in B/L Les up to knee      Anesthesia Plan  Type of Anesthesia, risks & benefits discussed:    Anesthesia Type: Gen ETT  Intra-op Monitoring Plan: Standard ASA Monitors  Post Op Pain Control Plan: multimodal analgesia and IV/PO Opioids PRN  Induction:  IV  Airway  Plan: Direct, Post-Induction  Informed Consent: Informed consent signed with the Patient and all parties understand the risks and agree with anesthesia plan.  All questions answered.   ASA Score: 4  Day of Surgery Review of History & Physical: H&P Update referred to the surgeon/provider.  Anesthesia Plan Notes: Intubation     Date/Time: 6/11/2024 7:01 AM     Performed by: David Schneider  Authorized by: Huseyin Parada II, MD    Intubation:     Induction:  Rapid sequence induction    Intubated:  Postinduction    Mask Ventilation:  Not attempted (RSI)    Attempts:  1    Attempted By:  Student    Method of Intubation:  Video laryngoscopy    Blade:  Nettles 3    Laryngeal View Grade: Grade I - full view of cords      Difficult Airway Encountered?: No      Complications:  None    Airway Device:  Oral endotracheal tube    Airway Device Size:  7.0    Style/Cuff Inflation:  Cuffed (inflated to minimal occlusive pressure)    Tube secured:  22    Secured at:  The lips    Placement Verified By:  Capnometry    Complicating Factors:  Poor neck/head extension    Findings Post-Intubation:  BS equal bilateral and atraumatic/condition of teeth unchanged      Ready For Surgery From Anesthesia Perspective.     .

## 2025-02-18 NOTE — TRANSFER OF CARE
"Anesthesia Transfer of Care Note    Patient: Nadia Damon    Procedure(s) Performed: Procedure(s) (LRB):  REMOVAL, ELECTRODE LEAD, SACRAL NERVE STIMULATOR (N/A)    Patient location: PACU    Anesthesia Type: general    Transport from OR: Transported from OR on room air with adequate spontaneous ventilation    Post pain: adequate analgesia    Post assessment: no apparent anesthetic complications and tolerated procedure well    Post vital signs: stable    Level of consciousness: awake    Nausea/Vomiting: no nausea/vomiting    Complications: none    Transfer of care protocol was followed      Last vitals: Visit Vitals  /74   Pulse 89   Temp 36.8 °C (98.2 °F) (Temporal)   Resp 18   Ht 5' 2" (1.575 m)   Wt 66.8 kg (147 lb 4.3 oz)   LMP 06/20/1983 (Within Years)   SpO2 99%   Breastfeeding No   BMI 26.94 kg/m²     "

## 2025-02-19 ENCOUNTER — TELEPHONE (OUTPATIENT)
Dept: PRIMARY CARE CLINIC | Facility: CLINIC | Age: 70
End: 2025-02-19
Payer: MEDICARE

## 2025-02-19 NOTE — PROGRESS NOTES
Interventional Pain Progress Note     Referring Physician: No ref. provider found    PCP: Elis Wick MD    Chief Complaint: Low Back Pain    Interval History (3/4/2025):  Patient Nadia Damon presents today for follow-up visit.  Patient was last seen on 2/6/2025 for her 1st left L4/5 + L5/S1 lumbar MBB which provided 90% relief.she would like to move forward with the 2nd MBB.  She also had a fall recently and had a CT lumbar to evaluate for any fractures. No acute findings or fracture noted.  Pain continues to be 9/10 and primarily axial in the low back without radiculopathy.   Worse with prolonged standing and walking.   Patient denies night fever/night sweats, urinary incontinence, bowel incontinence, significant weight loss and significant motor weakness.   Patient denies any other complaints or concerns at this time.      Interval History (1/8/2025):  Patient Nadia Damon presents today for follow-up visit.  Patient was last seen on 12/5/2024 Right L4/5 + L5/S1 RFA 50% relief. She is doing better ambulating without her walker and cane. Pain not going into the legs currently. She rates her pain today 7/10. She continues to have left sided pain and inquires about doing the RFA on the left. Pain is worse with prolonged standing and does not radiate. Pain is primarily axial.  She has finished rehab/PT following her stroke.  Patient denies night fever/night sweats, urinary incontinence, bowel incontinence, significant weight loss and significant motor weakness.   Patient denies any other complaints or concerns at this time.    Interval History (11/20/2024):  Patient Nadia Damon presents today for follow-up visit.  Patient was last seen on9/16/2024 T11/12 IL HELLEN with 85% relief. She had a stroke in July and saw neurosurgery following the stroke and was not interested in any surgical intervention. Overall she went through rehab and is improving in mobility and function. She continues to have  axial low back pain with the right side worse than the left. No radiculopathy. Pain worse with standing and prolonged walking. Relief with leaning forward. She rates her pain today 7/10.  Patient denies night fever/night sweats, urinary incontinence, bowel incontinence, significant weight loss and significant motor weakness.   Patient denies any other complaints or concerns at this time.      Interval History (3/3/2023):  Patient returns to clinic after procedure.  Patient reports 100% relief and thoracic pain after T11-T12 interlaminar epidural steroid injection on 02/02/2023.  Patient reports occasional throbbing pain across her right lower back with no radiation to her buttocks or lower extremities.  Pain is worse with lifting and prolonged standing, better with heat and topicals.  Pain is rated as a year out 10 currently. Denies any fevers, chills, changes in gait, weakness, or bowel and bladder incontinence        Interval History (1/13/22):  Patient returns to clinic for evaluation of thoracic pain.  Patient reports approximately 2 weeks ago she had sudden onset of lower thoracic pain.  She denies any significant inciting factors to his pain.  Pain starts as a sharp stabbing pain in her lower midline thoracic area and then radiates to her right side to her anterior chest wall.  Pain is worse with standing and walking, better with lying supine.  Pain is rated an 8/10.  Patient reports that this pain is significantly different from her previous lumbar pain. Denies any fevers, chills,  or bowel and bladder incontinence        Interval History (9/30/22):  Patient returns to clinic after procedure.  Patient reports complete resolution of right lower extremity pain after right sacroiliac joint injection and right L5-S1 facet injection on 08/22/2022.  Primary pain today is tight aching pain in right side of lower back.  Pain is worse with extension and rotation, better with rest and heat.  Patient does report an  episode of physical therapy on 2022 where she experienced increased muscle aches and weakness.  She reports that since this time her symptoms have greatly improved.  She denies any significant weakness today.  Pain is rated a 7/10. Denies any fevers, chills, changes in gait, weakness, or bowel and bladder incontinence        SUBJECTIVE:    Nadia Damon is a 69 y.o. female who presents to the clinic for the evaluation of lower back pain. The pain started 1 year ago and symptoms have been worsening.The pain is located in the right lower back and right buttocks area with radiation to the posterior lateral aspect of her right thigh and occasionally her right calf.  The pain is described as aching, shooting and stabbing and is rated as 5/10.   The pain is rated with a score of  2/10 on the BEST day and a score of 9/10 on the WORST day.  Symptoms interfere with daily activity and work. The pain is exacerbated by Sitting, Standing, Extension and Flexing.  The pain is mitigated by physical therapy and rest.     Patient denies night fever/night sweats, urinary incontinence, bowel incontinence, significant weight loss, significant motor weakness and loss of sensations.      Non-Pharmacologic Treatments:  Physical Therapy/Home Exercise: yes  Ice/Heat:yes  TENS: no  Acupuncture: no  Massage: no  Chiropractic: no        Previous Pain Medications:  Tylenol, topicals, neuropathic,      report:  Reviewed and consistent with medication use as prescribed.    Pain Procedures:   2023:  T11-12 interlaminar epidural steroid injection, 100% relief  22:  Right L4-5 and L5-S1 radiofrequency ablation, 75% relief  22:  Right sacroiliac joint and right L5-S1 facet injection,   Resolution of right lower extremity pain  2024 T11/12 IL HELLEN with 85% relief    Imagin2025 CT lumbar  FINDINGS:  Minor grade 1 2 mm L4-5 spondylolisthesis is present.     Advanced T11-12 disc degeneration is present with  high-grade disc narrowing and endplate sclerosis.  T10-11 and T11-12 vacuum disc phenomenon are evident.     T11-12: Severe disc degeneration as detailed above.  Mild to moderate right foraminal stenosis.     T12-L1: Mild disc bulge.     L1-2: Unremarkable.     L2-3: Unremarkable.     L3-4: Mild disc bulge with mild facet arthrosis.  Interspinous process bursitis.     L4-5: Mild degenerative disc narrowing with disc bulge.  Moderate to severe facet arthrosis with 2 mm spondylolisthesis.     L5-S1: Minor disc bulge.  Sacralized transverse processes with right pseudoarticulation.  Mild left foraminal stenosis.     Low-grade SI joint arthrosis.     Impression:     No acute abnormality.  Advanced T11-12 disc degeneration.     Mild degenerative changes L3-4, L4-5 and L5-S1 as detailed above.    8/27/2024 CT lumbar  FINDINGS:  No vertebral body compression deformity.  Normal bone mineral density.  Normal alignment of the vertebral bodies.  No soft tissue abnormality.  Mild moderate multilevel degenerative disc disease.  Atherosclerotic changes.     Impression:     No acute fracture or dislocation.  Mild moderate multilevel degenerative disc disease.  See recent myelogram for additional insights.    CT THORACIC SPINE WITHOUT CONTRAST 1/9/23     CLINICAL HISTORY:  Myelopathy, acute, thoracic spine;  Radiculopathy, thoracic region     TECHNIQUE:  Helical axial images are acquired through the thoracic spine without IV contrast.  Images were reformatted in the coronal sagittal plane.     COMPARISON:  None     FINDINGS:  Paraspinal soft tissues appear within normal limits.  Partially visualized lung fields demonstrate band like opacity at the lingula.  Favor scarring/atelectasis.  Partially visualized abdominal contents are within normal limits.     Alignment is within normal limits.  There is no anterolisthesis or retrolisthesis.  Vertebral body heights are relatively well preserved.  There appears to be a chronic fracture  deformity along the posterior spinous process of C7.  Borders of the bone fragment are well corticated.  No evidence of acute injury.  Partially visualized ribs appear intact.     Evaluation for central canal narrowing is limited without intrathecal contrast.     Multilevel degenerative disc changes are present, worst at the mid to lower thoracic spine.  Degenerative disc changes are worst at T11-T12.  There is a 5 mm disc bulge/herniation at T11-T12.  Disc bulge/herniation is slightly high density with minimal peripheral calcification.     No bony neural foraminal narrowing from C7 to T11.  There is moderate to severe right and mild-to-moderate left neural foraminal narrowing.     Impression:     1. Degenerative disc changes, worst at T11-T12.  Evaluation for central canal narrowing is limited without intrathecal contrast.  2. Bony neural foraminal narrowing is worst at the right T11-T12 foramen.         Results for orders placed during the hospital encounter of 07/13/22    X-Ray Lumbar Spine 5 View    Narrative  EXAMINATION:  XR LUMBAR SPINE COMPLETE 5 VIEW    CLINICAL HISTORY:  Sacrococcygeal disorders, not elsewhere classified    TECHNIQUE:  AP, lateral, spot and bilateral oblique views of the lumbar spine were performed.    COMPARISON:  Lumbar spine radiographs May 17, 2018    FINDINGS:  Minimal grade 1 anterolisthesis of L4 on L5.  No fracture or pars defect.  Unchanged mild disc height loss at the L4-L5 level.  Moderate to severe degenerative disc disease at the T11-T12 level.  Prominent inferior lumbar spine facet arthropathy.  Sacroiliac joints appear normal.  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  No osseous erosion or suspicious osseous lesion.    Impression  As above.      Electronically signed by: Isac Bell  Date:    07/13/2022  Time:    15:39          Past Medical History:   Diagnosis Date    Abdominal wall hernia     CT Renal  6/11/2018---Small fat containing superior ventral abdominal wall hernia at the epicardial pacing lead site.    Abnormal mammogram 10/12/2021    Acute cystitis without hematuria 05/10/2022    Acute ischemic right middle cerebral artery (MCA) stroke 07/20/2024    Adverse drug reaction 11/12/2024    GRACE (acute kidney injury) 11/22/2021    Anxiety     Aphasia 07/19/2024    Arthritis     ZEN HIPS    Breast cancer in female 08/2021    LEFT BREAST    Breast mass, right 11/13/2024    RIGHT BREAST MASS (Problem)      Bronchitis 08/18/2016    Never smoked      C. difficile colitis 11/29/2021    Cellulitis of axilla, left 12/23/2021    Chronic diastolic heart failure 12/16/2021    Chronic kidney disease     stage 4, GFR 15-29 ml/min    Chronic midline low back pain without sciatica 06/18/2018    CKD (chronic kidney disease) stage 4, GFR 15-29 ml/min 04/20/2016    US Retro   5/16/2018---Mild cortical thinning and increased cortical echogenicity.  Findings can be seen with medical renal disease.  8/31/216--- Echogenic kidneys with diffuse cortical thinning suggesting  medical renal disease. 2 complex right renal cortical cysts.      Closed nondisplaced fracture of distal phalanx of left great toe with routine healing 10/22/2018    Coronary artery disease 1993    heart transplant    COVID-19 in immunocompromised patient 02/26/2024    Cystitis 05/10/2022    Depression     Encounter for blood transfusion     Encounter for palliative care 10/14/2024    Fibromyalgia     on Lyrica    Heart failure     native heart cardiomyopathy    Heart transplanted 1993    due to cardiomyopathy    History of hyperparathyroidism; Hyperparathyroidism, secondary renal     PT DENIES    Hypertension     Immune disorder     anti rejection meds    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    Impaired mobility 07/28/2022    Iron deficiency anemia 08/15/2017    Kidney stones     passed per pt    Obesity     Obesity (BMI 30.0-34.9) 07/22/2019     Other osteoporosis without current pathological fracture 08/30/2019    Other pancytopenia 05/06/2024    Parotitis, acute 09/19/2023    Pharyngitis 01/09/2019    Pneumonia due to infectious organism 03/14/2024    PONV (postoperative nausea and vomiting)     Severe sepsis 11/22/2021    Shingles 2003 approx    left leg    Stage 4 chronic kidney disease 11/22/2021    Subclinical hypothyroidism 06/16/2023    Thrombocytopenia, unspecified 11/29/2021    Trouble in sleeping     Unresponsiveness 11/13/2024    Urinary incontinence      Past Surgical History:   Procedure Laterality Date    bladder implant Right 06/11/2024    BLADDER SURGERY  2015 approx    mesh - Dr Everett then 2nd reconstructive sx Dr Onofre    BREAST BIOPSY Bilateral     NEGATIVE    BREAST BIOPSY Right 10/31/2022    benign    BREAST LUMPECTOMY Left 2021    BREAST SURGERY Left 09/28/2015    Bx - benign    BREAST SURGERY Right 12/2015    Bx benign    CARDIAC PACEMAKER REMOVAL Left 06/26/2014    Pacer defirillator removed. Put in 1993 aat time of heart transplant    CARPAL TUNNEL RELEASE Left 03/03/2015    Dr. Hall    COLONOSCOPY N/A 02/25/2021    Procedure: COLONOSCOPY;  Surgeon: Freida Ramirez MD;  Location: [facility];  Service: Endoscopy;  Laterality: N/A;    CYSTOCELE REPAIR      Twice with mesh removal    EPIDURAL STEROID INJECTION INTO CERVICAL SPINE N/A 02/02/2023    Procedure: T11/T12 IL HELLEN;  Surgeon: Jassi Pierre MD;  Location: [facility]IN [facility];  Service: Pain Management;  Laterality: N/A;    EPIDURAL STEROID INJECTION INTO CERVICAL SPINE N/A 9/16/2024    Procedure: T11/T12 IL HELLEN;  Surgeon: Jassi Pierre MD;  Location: [facility];  Service: Pain Management;  Laterality: N/A;    HEART TRANSPLANT  1993    HERNIA REPAIR Right 1971 approx    Inguinal    HYSTERECTOMY  1983    vag hyst /LSO     IMPLANTATION OF PERMANENT SACRAL NERVE STIMULATOR N/A 06/11/2024    Procedure: INSERTION, NEUROSTIMULATOR, PERMANENT, SACRAL;  Surgeon:  Sami Cochran MD;  Location: [facility] OR;  Service: Urology;  Laterality: N/A;    INCISION AND DRAINAGE OF ABSCESS Left 12/24/2021    Procedure: INCISION AND DRAINAGE, ABSCESS;  Surgeon: Joseph Longo MD;  Location: [facility] OR;  Service: General;  Laterality: Left;    INJECTION OF ANESTHETIC AGENT AROUND MEDIAL BRANCH NERVES INNERVATING LUMBAR FACET JOINT Right 10/19/2022    Procedure: Right L4/L5 and L5/S1 MBB;  Surgeon: Jassi Pierre MD;  Location: [facility]IN [facility];  Service: Pain Management;  Laterality: Right;    INJECTION OF ANESTHETIC AGENT AROUND MEDIAL BRANCH NERVES INNERVATING LUMBAR FACET JOINT Right 11/09/2022    Procedure: Right L4/L5 and L5/S1 MBB;  Surgeon: Jassi Pierre MD;  Location: [facility];  Service: Pain Management;  Laterality: Right;    INJECTION OF ANESTHETIC AGENT AROUND MEDIAL BRANCH NERVES INNERVATING LUMBAR FACET JOINT Left 2/6/2025    Procedure: Left L4-5 and L5-S1 MBB #1 with local;  Surgeon: Jassi Pierre MD;  Location: [facility];  Service: Pain Management;  Laterality: Left;    INJECTION OF ANESTHETIC AGENT INTO SACROILIAC JOINT Right 08/22/2022    Procedure: Right SIJ Injection Right L5/S1 Facte Injection;  Surgeon: Jasis Pierre MD;  Location: [facility];  Service: Pain Management;  Laterality: Right;    INSERTION OF TUNNELED CENTRAL VENOUS CATHETER (CVC) WITH SUBCUTANEOUS PORT N/A 11/09/2021    Procedure: [id number];  Surgeon: Christoph Douglas MD;  Location: [facility] OR;  Service: General;  Laterality: N/A;    RADIOFREQUENCY ABLATION Right 12/5/2024    Procedure: Right L4-5 and L5-S1 RFA;  Surgeon: Jassi Pierre MD;  Location: [facility];  Service: Pain Management;  Laterality: Right;    RADIOFREQUENCY THERMOCOAGULATION Right 12/07/2022    Procedure: Right L4/L5 and L5/S1 Lumbar RFA;  Surgeon: Jassi Pierre MD;  Location: [facility];  Service: Pain Management;  Laterality: Right;    REMOVAL OF ELECTRODE LEAD OF SACRAL NERVE  STIMULATOR N/A 2025    Procedure: REMOVAL, ELECTRODE LEAD, SACRAL NERVE STIMULATOR;  Surgeon: Sami Cochran MD;  Location: [facility] OR;  Service: Urology;  Laterality: N/A;    REMOVAL OF VASCULAR ACCESS PORT      ROBOT-ASSISTED LAPAROSCOPIC ABDOMINAL SACROCOLPOPEXY N/A 08/10/2023    Procedure: ROBOTIC SACROCOLPOPEXY, ABDOMEN;  Surgeon: PRANAY Villalobos MD;  Location: [facility] OR;  Service: OB/GYN;  Laterality: N/A;    ROBOT-ASSISTED LAPAROSCOPIC OOPHORECTOMY Right 08/10/2023    Procedure: ROBOTIC OOPHORECTOMY;  Surgeon: PRANAY Villalobos MD;  Location: [facility] OR;  Service: OB/GYN;  Laterality: Right;    SENTINEL LYMPH NODE BIOPSY Left 10/12/2021    Procedure: BIOPSY, LYMPH NODE, SENTINEL;  Surgeon: Christoph Douglas MD;  Location: [facility] OR;  Service: General;  Laterality: Left;    TOE SURGERY      XI ROBOTIC URETHROPEXY N/A 08/10/2023    Procedure: XI ROBOTIC URETHROPEXY;  Surgeon: PRANAY Villalobos MD;  Location: [facility] OR;  Service: OB/GYN;  Laterality: N/A;     Social History     Socioeconomic History    Marital status: Single    Number of children: 2    Highest education level: 11th grade   Occupational History    Occupation: Retired   Tobacco Use    Smoking status: Never     Passive exposure: Never    Smokeless tobacco: Never   Substance and Sexual Activity    Alcohol use: Never     Alcohol/week: 0.0 standard drinks of alcohol    Drug use: No    Sexual activity: Not Currently     Partners: Male     Birth control/protection: See Surgical Hx   Other Topics Concern    Are you pregnant or think you may be? No    Breast-feeding No   Social History Narrative    Single. 2 children , 1  at 30 yoa  2014 strep throat -  pneumonia and renal complications after not completing course of AB. Other child lives in [place], [place]. Has a cousin locally that could help in an emergency. 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 Caffeine intake =- 1 cola a day. No coffee, + occasional tea,  avoids caffeine especially at night. Still drives. She does not have a Living Will or Advanced directive.      Social Drivers of Health     Financial Resource Strain: Low Risk  (1/7/2025)    Overall Financial Resource Strain (CARDIA)     Difficulty of Paying Living Expenses: Not hard at all   Food Insecurity: No Food Insecurity (1/7/2025)    Hunger Vital Sign     Worried About Running Out of Food in the Last Year: Never true     Ran Out of Food in the Last Year: Never true   Recent Concern: Food Insecurity - Food Insecurity Present (11/13/2024)    Hunger Vital Sign     Worried About Running Out of Food in the Last Year: Never true     Ran Out of Food in the Last Year: Sometimes true   Transportation Needs: No Transportation Needs (1/7/2025)    PRAPARE - Transportation     Lack of Transportation (Medical): No     Lack of Transportation (Non-Medical): No   Physical Activity: Inactive (1/7/2025)    Exercise Vital Sign     Days of Exercise per Week: 0 days     Minutes of Exercise per Session: 0 min   Stress: Stress Concern Present (1/7/2025)    [employer] [place] [employer] - Occupational Stress Questionnaire     Feeling of Stress : To some extent   Housing Stability: Unknown (1/7/2025)    Housing Stability Vital Sign     Unable to Pay for Housing in the Last Year: No     Homeless in the Last Year: No     Family History   Problem Relation Name Age of Onset    Cancer Mother  38        breast    Breast cancer Mother      Breast cancer Maternal Grandmother      Heart disease Maternal Grandmother      Hypertension Son      Cataracts Cousin      Diabetes Neg Hx      Stroke Neg Hx      Kidney disease Neg Hx      Asthma Neg Hx      COPD Neg Hx      Melanoma Neg Hx      Hyperlipidemia Neg Hx         Review of patient's allergies indicates:   Allergen Reactions    Dobutamine Other (See Comments)     Severe Left sided chest pain after dosage administered for Dobutamine Stress Test; itching    Lisinopril Swelling and  Rash    Hydrocodone-acetaminophen Nausea Only    Augmentin [amoxicillin-pot clavulanate] Diarrhea    Zyvox [linezolid] Nausea And Vomiting       Current Outpatient Medications   Medication Sig    aspirin (ECOTRIN) 81 MG EC tablet Take 1 tablet (81 mg total) by mouth once daily.    calcitRIOL (ROCALTROL) 0.25 MCG Cap Take 1 capsule (0.25 mcg total) by mouth once daily.    carvediloL (COREG) 3.125 MG tablet Take 1 tablet (3.125 mg total) by mouth 2 (two) times daily with meals. Hold parameters: SBP less than 110 and/or DBP less than 75    cycloSPORINE modified, NEORAL, (NEORAL) 25 MG capsule TAKE 3 CAPSULES TWICE DAILY    furosemide (LASIX) 40 MG tablet Take 1 tablet (40 mg total) by mouth once daily.    LIDOcaine (LIDODERM) 5 % Place 1 patch onto the skin once daily. Remove & Discard patch within 12 hours or as directed by MD    mupirocin (BACTROBAN) 2 % ointment Apply topically 2 (two) times daily.    NIFEdipine (PROCARDIA-XL) 60 MG (OSM) 24 hr tablet Take 1 tablet (60 mg total) by mouth once daily.    nitroGLYCERIN (NITROSTAT) 0.4 MG SL tablet PLACE 1 TABLET UNDER THE TONGUE EVERY 5 MINS AS NEEDED FOR CHEST PAIN FOR UP TO 3 DOSES.IF CONTINUED HEART PAIN/PRESSURE AFTER    ondansetron (ZOFRAN) 4 MG tablet Take 1 tablet (4 mg total) by mouth as needed for Nausea.    oxyCODONE-acetaminophen (PERCOCET) 5-325 mg per tablet Take 1 tablet by mouth every 4 (four) hours as needed for Pain.    pantoprazole (PROTONIX) 20 MG tablet TAKE 1 TABLET ONE TIME DAILY    patiromer calcium sorbitex (VELTASSA) 8.4 gram PwPk Take 1 packet (8.4 g total) by mouth once daily.    polyethylene glycol (GLYCOLAX) 17 gram PwPk Take 17 g by mouth once daily.    pregabalin (LYRICA) 50 MG capsule Take 1 capsule (50 mg total) by mouth every evening.    sodium bicarbonate 650 MG tablet Take 1 tablet (650 mg total) by mouth 2 (two) times daily.    temazepam (RESTORIL) 30 mg capsule Take 1 capsule (30 mg total) by mouth nightly as needed for Insomnia.  "FOR INSOMNIA    tiZANidine (ZANAFLEX) 2 MG tablet Take 2 tablets (4 mg total) by mouth 2 (two) times a day.    vitamin E 400 UNIT capsule Take 400 Units by mouth once daily.    zolpidem (AMBIEN) 5 MG Tab Take 1 tablet (5 mg total) by mouth every evening.     No current facility-administered medications for this visit.         ROS  Review of Systems   Constitutional:  Negative for chills, diaphoresis, fatigue and fever.   Respiratory:  Negative for chest tightness, shortness of breath, wheezing and stridor.    Cardiovascular:  Positive for leg swelling. Negative for chest pain.   Gastrointestinal:  Negative for blood in stool, diarrhea, nausea and vomiting.   Endocrine: Negative for cold intolerance and heat intolerance.   Genitourinary:  Negative for dysuria, hematuria and urgency.   Musculoskeletal:  Positive for arthralgias and back pain. Negative for gait problem, joint swelling, myalgias, neck pain and neck stiffness.   Skin:  Negative for rash.   Neurological:  Negative for tremors, seizures, speech difficulty, weakness, light-headedness, numbness and headaches.   Hematological:  Does not bruise/bleed easily.   Psychiatric/Behavioral:  Negative for agitation, confusion and suicidal ideas. The patient is not nervous/anxious.             OBJECTIVE:  BP (!) 158/89 (BP Location: Right arm, Patient Position: Sitting)   Pulse 86   Resp 17   Ht 5' 2" (1.575 m)   Wt 68.2 kg (150 lb 7.4 oz)   LMP 06/20/1983 (Within Years)   BMI 27.52 kg/m²         Physical Exam  Constitutional:       General: She is not in acute distress.     Appearance: Normal appearance. She is not ill-appearing.   HENT:      Head: Normocephalic and atraumatic.      Nose: No congestion or rhinorrhea.      Mouth/Throat:      Mouth: Mucous membranes are moist.   Eyes:      Extraocular Movements: Extraocular movements intact.      Pupils: Pupils are equal, round, and reactive to light.   Cardiovascular:      Pulses: Normal pulses.   Pulmonary:      " Effort: Pulmonary effort is normal.   Abdominal:      General: Abdomen is flat.      Palpations: Abdomen is soft.   Musculoskeletal:      Cervical back: Normal range of motion and neck supple.   Skin:     General: Skin is warm and dry.      Capillary Refill: Capillary refill takes less than 2 seconds.   Neurological:      General: No focal deficit present.      Mental Status: She is alert and oriented to person, place, and time.      Cranial Nerves: No cranial nerve deficit.      Sensory: No sensory deficit.      Motor: No weakness or abnormal muscle tone.      Gait: Gait abnormal (slight right foot drop).      Deep Tendon Reflexes: Babinski sign absent on the right side. Babinski sign absent on the left side.      Reflex Scores:       Patellar reflexes are 2+ on the right side and 2+ on the left side.       Achilles reflexes are 2+ on the right side and 2+ on the left side.  Psychiatric:         Mood and Affect: Mood normal.         Behavior: Behavior normal.         Thought Content: Thought content normal.           Musculoskeletal:      Lumbar Exam  Incision: no  Pain with Flexion: yes  Pain with Extension: yes > than flexion  ROM:  limited due to pain  Paraspinous TTP:  bilateral lumbar paraspinous  Facet TTP:  L5-S1  Facet Loading:  Positive bilaterally  SLR:  Negative bilaterally  SIJ TTP:  positive bilaterally  ALBERTO:  positive bilaterally with compression and distraction      LABS:  Lab Results   Component Value Date    WBC 3.62 (L) 02/24/2025    WBC 3.62 (L) 02/24/2025    HGB 9.1 (L) 02/24/2025    HGB 9.1 (L) 02/24/2025    HCT 29.2 (L) 02/24/2025    HCT 29.2 (L) 02/24/2025    MCV 92 02/24/2025    MCV 92 02/24/2025     02/24/2025     02/24/2025       CMP  Sodium   Date Value Ref Range Status   02/24/2025 142 136 - 145 mmol/L Final   02/24/2025 142 136 - 145 mmol/L Final     Potassium   Date Value Ref Range Status   02/24/2025 4.7 3.5 - 5.1 mmol/L Final   02/24/2025 4.7 3.5 - 5.1 mmol/L Final      Chloride   Date Value Ref Range Status   02/24/2025 105 95 - 110 mmol/L Final   02/24/2025 105 95 - 110 mmol/L Final     CO2   Date Value Ref Range Status   02/24/2025 21 (L) 23 - 29 mmol/L Final   02/24/2025 21 (L) 23 - 29 mmol/L Final     Glucose   Date Value Ref Range Status   02/24/2025 76 70 - 110 mg/dL Final   02/24/2025 76 70 - 110 mg/dL Final     BUN   Date Value Ref Range Status   02/24/2025 37 (H) 8 - 23 mg/dL Final   02/24/2025 37 (H) 8 - 23 mg/dL Final     Creatinine   Date Value Ref Range Status   02/24/2025 4.1 (H) 0.5 - 1.4 mg/dL Final   02/24/2025 4.1 (H) 0.5 - 1.4 mg/dL Final     Calcium   Date Value Ref Range Status   02/24/2025 9.7 8.7 - 10.5 mg/dL Final   02/24/2025 9.7 8.7 - 10.5 mg/dL Final     Total Protein   Date Value Ref Range Status   02/24/2025 7.9 6.0 - 8.4 g/dL Final     Albumin   Date Value Ref Range Status   02/24/2025 3.3 (L) 3.5 - 5.2 g/dL Final   02/24/2025 3.3 (L) 3.5 - 5.2 g/dL Final     Total Bilirubin   Date Value Ref Range Status   02/24/2025 0.4 0.1 - 1.0 mg/dL Final     Comment:     For infants and newborns, interpretation of results should be based  on gestational age, weight and in agreement with clinical  observations.    Premature Infant recommended reference ranges:  Up to 24 hours.............<8.0 mg/dL  Up to 48 hours............<12.0 mg/dL  3-5 days..................<15.0 mg/dL  6-29 days.................<15.0 mg/dL       Alkaline Phosphatase   Date Value Ref Range Status   02/24/2025 120 40 - 150 U/L Final     AST   Date Value Ref Range Status   02/24/2025 42 (H) 10 - 40 U/L Final     ALT   Date Value Ref Range Status   02/24/2025 28 10 - 44 U/L Final     Anion Gap   Date Value Ref Range Status   02/24/2025 16 8 - 16 mmol/L Final   02/24/2025 16 8 - 16 mmol/L Final     eGFR if    Date Value Ref Range Status   07/14/2022 19 (A) >60 mL/min/1.73 m^2 Final     eGFR if non    Date Value Ref Range Status   07/14/2022 17 (A) >60  mL/min/1.73 m^2 Final     Comment:     Calculation used to obtain the estimated glomerular filtration  rate (eGFR) is the CKD-EPI equation.          Lab Results   Component Value Date    HGBA1C 5.1 07/02/2024             ASSESSMENT:       69 y.o. year old female with lower back pain, consistent with     1. Lumbar spondylosis  Case Request-RAD/Other Procedure Area: Left L4-5 and L5-S1 MBB #2 with local      2. Thoracic radiculopathy        3. Thoracic spondylosis                  Lumbar spondylosis  -     Case Request-RAD/Other Procedure Area: Left L4-5 and L5-S1 MBB #2 with local    Thoracic radiculopathy    Thoracic spondylosis                     PLAN:   - Interventions: Schedule left L3-5 MBB #2. We will call the day after the procedure.  If patient again reports at least 80% relief of pain, we will then move forward with the RFA.   Explained the risks and benefits of the procedure in detail with the patient today in clinic along with alternative treatment options, and the patient elected to pursue the intervention.        02/02/2023:  T11-12 interlaminar epidural steroid injection, 100% relief  -12/7/22:  Right L4-5 and L5-S1 radiofrequency ablation, 75% relief  -8/22/22:  Right sacroiliac joint and right L5-S1 facet injection,   Resolution of right lower extremity pain  - Anticoagulation use:   Aspirin 81mg does not need to hold for lumbar MBB    - Medications:   Continue Tylenol, and Lyrica as prescribed by primary care  Taken off cymbalta by PCP  Started by back on tizanidine by Dr. Wick   Per Uptodate dosing does not need to be adjusted for renal impairment    - Therapy:    Continue formal physical therapy    - Imaging:   CT of thoracic spine reviewed.  Significant for right eccentric disc bulge at T11-12 with foraminal stenosis   X-ray Lumbar  reviewed. Significant for grade 1 anterolisthesis of L4 upon L5.  Degenerative disc disease at T11-T12.  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 Patient is in agreement with the above and verbalized understanding.    I discussed the goals of interventional chronic pain management with the patient on today's visit.  I explained the utility of injections for diagnostic and therapeutic purposes.  We discussed a multimodal approach to pain including treating the patient's given worst pain at any given time.  We will use a systematic approach to addressing pain.  We will also adopt a multimodal approach that includes injections, adjuvant medications, physical therapy, at times psychiatry.  There may be a limited role for opioid use intermittently in the treatment of pain, more particularly for acute pain although no one approach can be used as a sole treatment modality.    I emphasized the importance of regular exercise, core strengthening and stretching, diet and weight loss as a cornerstone of long-term pain management.      Corin Lincoln NP  Interventional Pain Management  [facility]    Disclaimer:  This note was prepared using voice recognition system and is likely to have sound alike errors that may have been overlooked even after proof reading.  Please call me with any questions

## 2025-02-19 NOTE — TELEPHONE ENCOUNTER
Spoke with pt post procedure. Pt states she is resting well at home, was sent home with directions on how to take care of dressing. Pt states she will keep upcoming appt with Mere Pineda, 2/28.     SJ

## 2025-02-20 ENCOUNTER — TELEPHONE (OUTPATIENT)
Dept: PALLIATIVE MEDICINE | Facility: CLINIC | Age: 70
End: 2025-02-20
Payer: MEDICARE

## 2025-02-20 NOTE — TELEPHONE ENCOUNTER
----- Message from Pia sent at 2/20/2025 12:10 PM CST -----  Contact: TICO LIANG [[id number]]  .Type:  Patient Requesting CallWho Called:TICO LIANG [[id number]]Does the patient know what this is regarding?:Patient would like a call back in regards to Long term care would like to leave contact information for Access service [phone], fax [phone] the patient rather a call back or a response via [facility]? Call [facility] Call Back Number:.[phone] (home)  Additional Information: Patient would like a call back in regards to some issues she is having.

## 2025-02-22 NOTE — ANESTHESIA POSTPROCEDURE EVALUATION
Anesthesia Post Evaluation    Patient: Nadia Damon    Procedure(s) Performed: Procedure(s) (LRB):  REMOVAL, ELECTRODE LEAD, SACRAL NERVE STIMULATOR (N/A)    Final Anesthesia Type: general      Patient location during evaluation: PACU  Patient participation: Yes- Able to Participate  Level of consciousness: awake and alert and oriented  Post-procedure vital signs: reviewed and stable  Pain management: adequate  Airway patency: patent  DIALLO mitigation strategies: Verification of full reversal of neuromuscular block  PONV status at discharge: No PONV  Anesthetic complications: no      Cardiovascular status: blood pressure returned to baseline and hemodynamically stable  Respiratory status: unassisted  Hydration status: euvolemic  Follow-up not needed.              Vitals Value Taken Time   /90 02/18/25 12:15   Temp 36.7 °C (98 °F) 02/18/25 12:15   Pulse 84 02/18/25 12:19   Resp 13 02/18/25 12:19   SpO2 93 % 02/18/25 12:19   Vitals shown include unfiled device data.      Event Time   Out of Recovery 12:22:00         Pain/Marixa Score: No data recorded

## 2025-02-24 ENCOUNTER — PATIENT MESSAGE (OUTPATIENT)
Dept: PRIMARY CARE CLINIC | Facility: CLINIC | Age: 70
End: 2025-02-24

## 2025-02-24 ENCOUNTER — HOSPITAL ENCOUNTER (OUTPATIENT)
Dept: RADIOLOGY | Facility: HOSPITAL | Age: 70
Discharge: HOME | End: 2025-02-24
Attending: NURSE PRACTITIONER
Payer: MEDICARE

## 2025-02-24 ENCOUNTER — OFFICE VISIT (OUTPATIENT)
Dept: PRIMARY CARE CLINIC | Facility: CLINIC | Age: 70
End: 2025-02-24
Payer: MEDICARE

## 2025-02-24 ENCOUNTER — HOSPITAL ENCOUNTER (OUTPATIENT)
Dept: RADIOLOGY | Facility: HOSPITAL | Age: 70
Discharge: HOME | End: 2025-02-24
Attending: ANESTHESIOLOGY
Payer: MEDICARE

## 2025-02-24 VITALS
DIASTOLIC BLOOD PRESSURE: 68 MMHG | HEIGHT: 62 IN | SYSTOLIC BLOOD PRESSURE: 128 MMHG | HEART RATE: 86 BPM | TEMPERATURE: 99 F | OXYGEN SATURATION: 95 % | WEIGHT: 148.69 LBS | BODY MASS INDEX: 27.36 KG/M2

## 2025-02-24 DIAGNOSIS — R11.2 NAUSEA AND VOMITING, UNSPECIFIED VOMITING TYPE: Primary | ICD-10-CM
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
## 2025-02-24 DIAGNOSIS — M54.16 LUMBAR RADICULOPATHY: ICD-10-CM

## 2025-02-24 DIAGNOSIS — G89.11 ACUTE LOW BACK PAIN DUE TO TRAUMA: ICD-10-CM

## 2025-02-24 DIAGNOSIS — R11.2 NAUSEA AND VOMITING, UNSPECIFIED VOMITING TYPE: ICD-10-CM

## 2025-02-24 DIAGNOSIS — D84.9 IMMUNOCOMPROMISED PATIENT: ICD-10-CM

## 2025-02-24 DIAGNOSIS — M54.50 ACUTE LOW BACK PAIN DUE TO TRAUMA: ICD-10-CM

## 2025-02-24 DIAGNOSIS — M54.9 DORSALGIA, UNSPECIFIED: ICD-10-CM

## 2025-02-24 PROCEDURE — 3066F NEPHROPATHY DOC TX: CPT | Mod: HCNC,CPTII,S$GLB, | Performed by: NURSE PRACTITIONER

## 2025-02-24 PROCEDURE — 3074F SYST BP LT 130 MM HG: CPT | Mod: HCNC,CPTII,S$GLB, | Performed by: NURSE PRACTITIONER

## 2025-02-24 PROCEDURE — 3288F FALL RISK ASSESSMENT DOCD: CPT | Mod: HCNC,CPTII,S$GLB, | Performed by: NURSE PRACTITIONER

## 2025-02-24 PROCEDURE — 99999 PR PBB SHADOW E&M-EST. PATIENT-LVL IV: CPT | Mod: PBBFAC,HCNC,, | Performed by: NURSE PRACTITIONER

## 2025-02-24 PROCEDURE — 1101F PT FALLS ASSESS-DOCD LE1/YR: CPT | Mod: HCNC,CPTII,S$GLB, | Performed by: NURSE PRACTITIONER

## 2025-02-24 PROCEDURE — 3078F DIAST BP <80 MM HG: CPT | Mod: HCNC,CPTII,S$GLB, | Performed by: NURSE PRACTITIONER

## 2025-02-24 PROCEDURE — 3008F BODY MASS INDEX DOCD: CPT | Mod: HCNC,CPTII,S$GLB, | Performed by: NURSE PRACTITIONER

## 2025-02-24 PROCEDURE — 1157F ADVNC CARE PLAN IN RCRD: CPT | Mod: HCNC,CPTII,S$GLB, | Performed by: NURSE PRACTITIONER

## 2025-02-24 PROCEDURE — 1159F MED LIST DOCD IN RCRD: CPT | Mod: HCNC,CPTII,S$GLB, | Performed by: NURSE PRACTITIONER

## 2025-02-24 PROCEDURE — 72131 CT LUMBAR SPINE W/O DYE: CPT | Mod: TC,HCNC

## 2025-02-24 PROCEDURE — 74150 CT ABDOMEN W/O CONTRAST: CPT | Mod: 26,HCNC,, | Performed by: RADIOLOGY

## 2025-02-24 PROCEDURE — 72131 CT LUMBAR SPINE W/O DYE: CPT | Mod: 26,HCNC,, | Performed by: RADIOLOGY

## 2025-02-24 PROCEDURE — 1125F AMNT PAIN NOTED PAIN PRSNT: CPT | Mod: HCNC,CPTII,S$GLB, | Performed by: NURSE PRACTITIONER

## 2025-02-24 PROCEDURE — 99215 OFFICE O/P EST HI 40 MIN: CPT | Mod: HCNC,S$GLB,, | Performed by: NURSE PRACTITIONER

## 2025-02-24 PROCEDURE — 74150 CT ABDOMEN W/O CONTRAST: CPT | Mod: TC,HCNC

## 2025-02-24 RX ORDER — ONDANSETRON 4 MG/1
4 TABLET, FILM COATED ORAL
Qty: 16 TABLET | Refills: 0 | Status: SHIPPED | OUTPATIENT
Start: 2025-02-24

## 2025-02-24 RX ORDER — MUPIROCIN 20 MG/G
OINTMENT TOPICAL 2 TIMES DAILY
Qty: 22 G | Refills: 0 | Status: SHIPPED | OUTPATIENT
Start: 2025-02-24

## 2025-02-24 NOTE — PROGRESS NOTES
Nadia Damon  02/24/2025  [id number]    Elis Wick MD  Patient Care Team:  Elis Wick MD as PCP - General (Internal Medicine)  Miguel Soni Jr., MD as Consulting Physician (Vascular Surgery)  Ike King MD as Consulting Physician (Cardiology)  Courtney Tubbs MD as Consulting Physician (Cardiology)  Carter Crawford MD as Consulting Physician (Nephrology)  Paxton Vasques OD as Consulting Physician (Optometry)  PRANAY Villalobos MD as Obstetrician (Obstetrics)  Ike King MD as Consulting Physician (Cardiology)  Jose Roland MD as Consulting Physician (Dermatology)  Prasanth Johnson MD as Consulting Physician (Rheumatology)  Elis Wick MD as Physician (Internal Medicine)  Lexi Bianchi LCSW as  (Hematology and Oncology)  Kelsie Burk PA-C as Physician Assistant (Hematology and Oncology)  Chelsea Monte as ED Navigator  Jacobs, Kymeesha [facility] Note      Chief Complaint:  Chief Complaint   Patient presents with    Back Pain         Assessment/Plan:  1. Nausea and vomiting, unspecified vomiting type  -     CT Abdomen Without Contrast; Future; Expected date: 02/24/2025  -     ondansetron (ZOFRAN) 4 MG tablet; Take 1 tablet (4 mg total) by mouth as needed for Nausea.  Dispense: 16 tablet; Refill: 0    2. Immunocompromised patient  -     mupirocin (BACTROBAN) 2 % ointment; Apply topically 2 (two) times daily.  Dispense: 22 g; Refill: 0          Worry Score: 3  History of Present Illness:    Last visit with Dr. Puente 1/31/2025     Ms. Damon presents with c/o upper abdominal pain onset over the weekend.   On 2/18 - she had removal of Interstim device by Dr. Cochran -not working, pain around coccyx. Wound has dissovable stitches and dressing. She is scheduled for CT of spine today due to recent fall backwards.  Also, reports pain to the right lumbar back area.   Her kitchen flooded over the weekend which required her to  "mop water and pull and twist at her refrigerator.   Now she describes upper abdomen soreness that radiates around the abdomen with associated nausea. Ms. Damon has a ventral hernia to upper abdomen  CT ABD 2024 - There is a herniation in the anterior wall of the upper abdomen with herniation of a portion of the left hepatic lobe extending into the hernia sac   Ate breakfast Sunday - nausea, vomited small amount  Vomited water and food she tried to eat  Soreness around the upper abdomen and right flank pain - turning the torso, rating the pain "8"  Ate a small amount yesterday and tolerated without vomiting but did have nausea  Did not eat or drink this AM  Taking sips of Sprite without vomiting - did have nausea  Did not vomit the pain pill.  She states her upper abdomen has been firm for "months" and they are watching the hernia closely.       Denies fever, chills, URI symptoms, chest pain, SOB, palpitations, diarrhea, no gross neuro deficits, urinary symptoms      The following were reviewed: Active problem list, medication list, allergies, family history, social history, and Health Maintenance.     History:  Past Medical History:   Diagnosis Date    Abdominal wall hernia     CT Renal 6/11/2018---Small fat containing superior ventral abdominal wall hernia at the epicardial pacing lead site.    Abnormal mammogram 10/12/2021    Acute cystitis without hematuria 05/10/2022    Acute ischemic right middle cerebral artery (MCA) stroke 07/20/2024    Adverse drug reaction 11/12/2024    GRACE (acute kidney injury) 11/22/2021    Anxiety     Aphasia 07/19/2024    Arthritis     ZEN HIPS    Breast cancer in female 08/2021    LEFT BREAST    Breast mass, right 11/13/2024    RIGHT BREAST MASS (Problem)      Bronchitis 08/18/2016    Never smoked      C. difficile colitis 11/29/2021    Cellulitis of axilla, left 12/23/2021    Chronic diastolic heart failure 12/16/2021    Chronic kidney disease     stage 4, GFR 15-29 ml/min    " Chronic midline low back pain without sciatica 06/18/2018    CKD (chronic kidney disease) stage 4, GFR 15-29 ml/min 04/20/2016    US Retro   5/16/2018---Mild cortical thinning and increased cortical echogenicity.  Findings can be seen with medical renal disease.  8/31/216--- Echogenic kidneys with diffuse cortical thinning suggesting  medical renal disease. 2 complex right renal cortical cysts.      Closed nondisplaced fracture of distal phalanx of left great toe with routine healing 10/22/2018    Coronary artery disease 1993    heart transplant    COVID-19 in immunocompromised patient 02/26/2024    Cystitis 05/10/2022    Depression     Encounter for blood transfusion     Encounter for palliative care 10/14/2024    Fibromyalgia     on Lyrica    Heart failure     native heart cardiomyopathy    Heart transplanted 1993    due to cardiomyopathy    History of hyperparathyroidism; Hyperparathyroidism, secondary renal     PT DENIES    Hypertension     Immune disorder     anti rejection meds    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    Impaired mobility 07/28/2022    Iron deficiency anemia 08/15/2017    Kidney stones     passed per pt    Obesity     Obesity (BMI 30.0-34.9) 07/22/2019    Other osteoporosis without current pathological fracture 08/30/2019    Other pancytopenia 05/06/2024    Parotitis, acute 09/19/2023    Pharyngitis 01/09/2019    Pneumonia due to infectious organism 03/14/2024    PONV (postoperative nausea and vomiting)     Severe sepsis 11/22/2021    Shingles 2003 approx    left leg    Stage 4 chronic kidney disease 11/22/2021    Subclinical hypothyroidism 06/16/2023    Thrombocytopenia, unspecified 11/29/2021    Trouble in sleeping     Unresponsiveness 11/13/2024    Urinary incontinence      Past Surgical History:   Procedure Laterality Date    bladder implant Right 06/11/2024    BLADDER SURGERY  2015 approx    mesh - Dr Everett then 2nd reconstructive sx Dr Onofre    BREAST BIOPSY  Bilateral     NEGATIVE    BREAST BIOPSY Right 10/31/2022    benign    BREAST LUMPECTOMY Left 2021    BREAST SURGERY Left 09/28/2015    Bx - benign    BREAST SURGERY Right 12/2015    Bx benign    CARDIAC PACEMAKER REMOVAL Left 06/26/2014    Pacer defirillator removed. Put in 1993 aat time of heart transplant    CARPAL TUNNEL RELEASE Left 03/03/2015    Dr. Hall    COLONOSCOPY N/A 02/25/2021    Procedure: COLONOSCOPY;  Surgeon: Freida Ramirez MD;  Location: [facility] ENDO;  Service: Endoscopy;  Laterality: N/A;    CYSTOCELE REPAIR      Twice with mesh removal    EPIDURAL STEROID INJECTION INTO CERVICAL SPINE N/A 02/02/2023    Procedure: T11/T12 IL HELLEN;  Surgeon: Jassi Pierre MD;  Location: [facility] PAIN MGT;  Service: Pain Management;  Laterality: N/A;    EPIDURAL STEROID INJECTION INTO CERVICAL SPINE N/A 9/16/2024    Procedure: T11/T12 IL HELLEN;  Surgeon: Jassi Pierre MD;  Location: [facility] PAIN MGT;  Service: Pain Management;  Laterality: N/A;    HEART TRANSPLANT  1993    HERNIA REPAIR Right 1971 approx    Inguinal    HYSTERECTOMY  1983    vag hyst /LSO     IMPLANTATION OF PERMANENT SACRAL NERVE STIMULATOR N/A 06/11/2024    Procedure: INSERTION, NEUROSTIMULATOR, PERMANENT, SACRAL;  Surgeon: Sami Cochran MD;  Location: [facility] OR;  Service: Urology;  Laterality: N/A;    INCISION AND DRAINAGE OF ABSCESS Left 12/24/2021    Procedure: INCISION AND DRAINAGE, ABSCESS;  Surgeon: Joseph Longo MD;  Location: [facility] OR;  Service: General;  Laterality: Left;    INJECTION OF ANESTHETIC AGENT AROUND MEDIAL BRANCH NERVES INNERVATING LUMBAR FACET JOINT Right 10/19/2022    Procedure: Right L4/L5 and L5/S1 MBB;  Surgeon: Jassi Pierre MD;  Location: [facility] PAIN MGT;  Service: Pain Management;  Laterality: Right;    INJECTION OF ANESTHETIC AGENT AROUND MEDIAL BRANCH NERVES INNERVATING LUMBAR FACET JOINT Right 11/09/2022    Procedure: Right L4/L5 and L5/S1 MBB;  Surgeon: Jassi Pierre MD;  Location: [facility] PAIN MGT;   Service: Pain Management;  Laterality: Right;    INJECTION OF ANESTHETIC AGENT AROUND MEDIAL BRANCH NERVES INNERVATING LUMBAR FACET JOINT Left 2/6/2025    Procedure: Left L4-5 and L5-S1 MBB #1 with local;  Surgeon: Jassi Pierre MD;  Location: [facility] PAIN MGT;  Service: Pain Management;  Laterality: Left;    INJECTION OF ANESTHETIC AGENT INTO SACROILIAC JOINT Right 08/22/2022    Procedure: Right SIJ Injection Right L5/S1 Facte Injection;  Surgeon: Jassi Pierre MD;  Location: [facility] PAIN MGT;  Service: Pain Management;  Laterality: Right;    INSERTION OF TUNNELED CENTRAL VENOUS CATHETER (CVC) WITH SUBCUTANEOUS PORT N/A 11/09/2021    Procedure: [id number];  Surgeon: Christoph Douglas MD;  Location: [facility] OR;  Service: General;  Laterality: N/A;    RADIOFREQUENCY ABLATION Right 12/5/2024    Procedure: Right L4-5 and L5-S1 RFA;  Surgeon: Jassi Pierre MD;  Location: [facility] PAIN MGT;  Service: Pain Management;  Laterality: Right;    RADIOFREQUENCY THERMOCOAGULATION Right 12/07/2022    Procedure: Right L4/L5 and L5/S1 Lumbar RFA;  Surgeon: Jassi Pierre MD;  Location: [facility] PAIN MGT;  Service: Pain Management;  Laterality: Right;    REMOVAL OF ELECTRODE LEAD OF SACRAL NERVE STIMULATOR N/A 2/18/2025    Procedure: REMOVAL, ELECTRODE LEAD, SACRAL NERVE STIMULATOR;  Surgeon: Sami Cochran MD;  Location: [facility] OR;  Service: Urology;  Laterality: N/A;    REMOVAL OF VASCULAR ACCESS PORT      ROBOT-ASSISTED LAPAROSCOPIC ABDOMINAL SACROCOLPOPEXY N/A 08/10/2023    Procedure: ROBOTIC SACROCOLPOPEXY, ABDOMEN;  Surgeon: PRANAY Villalobos MD;  Location: [facility] OR;  Service: OB/GYN;  Laterality: N/A;    ROBOT-ASSISTED LAPAROSCOPIC OOPHORECTOMY Right 08/10/2023    Procedure: ROBOTIC OOPHORECTOMY;  Surgeon: PRANAY Villalobos MD;  Location: [facility] OR;  Service: OB/GYN;  Laterality: Right;    SENTINEL LYMPH NODE BIOPSY Left 10/12/2021    Procedure: BIOPSY, LYMPH NODE, SENTINEL;  Surgeon: Christoph Douglas MD;   Location: [facility] OR;  Service: General;  Laterality: Left;    TOE SURGERY      XI ROBOTIC URETHROPEXY N/A 08/10/2023    Procedure: XI ROBOTIC URETHROPEXY;  Surgeon: PRANAY Villalobos MD;  Location: [facility] OR;  Service: OB/GYN;  Laterality: N/A;     Family History   Problem Relation Name Age of Onset    Cancer Mother  38        breast    Breast cancer Mother      Breast cancer Maternal Grandmother      Heart disease Maternal Grandmother      Hypertension Son      Cataracts Cousin      Diabetes Neg Hx      Stroke Neg Hx      Kidney disease Neg Hx      Asthma Neg Hx      COPD Neg Hx      Melanoma Neg Hx      Hyperlipidemia Neg Hx       Problem List[1]  Review of patient's allergies indicates:   Allergen Reactions    Dobutamine Other (See Comments)     Severe Left sided chest pain after dosage administered for Dobutamine Stress Test; itching    Lisinopril Swelling and Rash    Hydrocodone-acetaminophen Nausea Only    Augmentin [amoxicillin-pot clavulanate] Diarrhea    Zyvox [linezolid] Nausea And Vomiting       PHQ-9       LUPE-7       Medications:  Medications Ordered Prior to Encounter[2]    Medications have been reviewed and reconciled with patient at visit today.      Exam:  Vitals:    02/24/25 0939   BP: 128/68   Pulse: 86   Temp: 98.5 °F (36.9 °C)     Weight: 67.4 kg (148 lb 11.2 oz)   Body mass index is 27.2 kg/m².      BP Readings from Last 3 Encounters:   02/24/25 128/68   02/18/25 (!) 116/90   02/10/25 130/72     Wt Readings from Last 3 Encounters:   02/24/25 67.4 kg (148 lb 11.2 oz)   02/18/25 66.8 kg (147 lb 4.3 oz)   02/10/25 68.1 kg (150 lb 2.1 oz)         Physical Exam  Vitals reviewed.   Constitutional:       General: She is not in acute distress.     Comments: Facial grimacing due to pain   HENT:      Head: Normocephalic and atraumatic.   Eyes:      Conjunctiva/sclera: Conjunctivae normal.   Cardiovascular:      Rate and Rhythm: Normal rate. Rhythm irregular.      Heart sounds: Murmur heard.   Pulmonary:  "     Effort: Pulmonary effort is normal. No respiratory distress.      Breath sounds: Normal breath sounds.   Abdominal:      General: There is distension.      Palpations: Abdomen is soft. There is no mass.      Tenderness: There is no abdominal tenderness.      Hernia: A hernia is present. Hernia is present in the ventral area.          Comments: Ventral hernia that is firm  With pt lying supine on exam table - hernia was reducible and more soft   Musculoskeletal:      Cervical back: Neck supple.      Right lower leg: No edema.      Left lower leg: No edema.   Skin:     General: Skin is warm and dry.   Neurological:      Mental Status: She is alert and oriented to person, place, and time. Mental status is at baseline.   Psychiatric:         Mood and Affect: Mood normal.         Thought Content: Thought content normal.              Laboratory Reviewed:     Lab Results   Component Value Date    WBC 3.41 (L) 01/19/2025    HGB 9.6 (L) 01/19/2025    HCT 30.1 (L) 01/19/2025     01/19/2025    CHOL 142 07/20/2024    TRIG 85 07/20/2024    HDL 44 07/20/2024    ALT 57 (H) 01/19/2025     (H) 01/19/2025     02/07/2025    K 4.0 02/07/2025     02/07/2025    CREATININE 4.1 (H) 02/07/2025    BUN 53 (H) 02/07/2025    CO2 24 02/07/2025    TSH 9.132 (H) 01/16/2025    PSA <0.1 05/27/2008    INR 1.0 11/12/2024    HGBA1C 5.1 07/02/2024       Screening or Prevention Patient's value Goal Complete/Controlled?   HgA1C Testing and Control   Lab Results   Component Value Date    HGBA1C 5.1 07/02/2024      Annually/Less than 8% Yes   Lipid profile : 07/20/2024 Annually Yes   LDL control Lab Results   Component Value Date    LDLCALC 81.0 07/20/2024    Annually/Less than 100 mg/dl  Yes   Nephropathy screening No results found for: "LABMICR"  Lab Results   Component Value Date    PROTEINUA 1+ (A) 02/07/2025    Annually Yes   Blood pressure BP Readings from Last 1 Encounters:   02/24/25 128/68    Less than 140/90 Yes "   Dilated retinal exam Most Recent Eye Exam Date: Not Found Annually Yes   Foot exam   Most Recent Foot Exam Date: Not Found Annually Yes       Health Maintenance  Health Maintenance Topics with due status: Not Due       Topic Last Completion Date    TETANUS VACCINE 09/09/2020    Colorectal Cancer Screening 02/25/2021    Hemoglobin A1c (Diabetic Prevention Screening) 07/02/2024    DEXA Scan 07/03/2024    Lipid Panel 07/20/2024     Health Maintenance Due   Topic Date Due    RSV Vaccine (Age 60+ and Pregnant patients) (1 - Risk 60-74 years 1-dose series) Never done    Mammogram  04/18/2025               -Patient's lab results were reviewed and discussed with patient  -Treatment options and alternatives were discussed with the patient. Patient expressed understanding. Patient was given the opportunity to ask questions and be an active participant in their medical care. Patient had no further questions or concerns at this time.         Future Appointments   Date Time Provider Department Center   2/24/2025 12:00 PM [facility] CT1 LIMIT 500 LBS [facility] CT SCAN [place]   2/24/2025 12:30 PM [place] [facility] LAB [facility] LAB DS [facility]   2/24/2025  1:30 PM [facility] CT1 [facility] CTSCAN [facility]   2/26/2025  1:30 PM [facility]   2/28/2025  9:30 AM Génesis Gonzales LCSW [facility] 65PLUS [facility]   2/28/2025 10:40 AM Mere Pineda FNP-C [facility]   3/4/2025  1:40 PM Corin Lincoln NP [facility] IN [facility]   3/24/2025 11:00 AM LABORATORY, [facility] INDRA [facility] LAB [place]   3/24/2025 11:20 AM SPECIMEN, O'[facility]KH [facility] SPECLAB [place]   3/31/2025  8:30 AM Gunner Melgar MD [facility] NEPHRO  [facility]   4/3/2025 10:00 AM ON[facility] MAMMO1 [facility] MAMMO [place]   4/9/2025 10:40 AM Christoph Douglas MD [facility] GENSUR [place]   4/10/2025  9:00 AM Jassi Pierre MD [facility] INT JUDIT [place]   4/14/2025 11:00 AM Kristine Delgado NP [facility] BRESUR [facility]   4/25/2025 10:45 AM Sami Cochran MD ON UROLOGY [facility]    6/9/2025  1:45 PM Paxton Vasques, VIRA [facility] OPHTHAL [facility]          After visit summary printed and given to patient upon discharge.  Patient goals and care plan are included in After visit summary.      The following issues were discussed: The primary encounter diagnosis was Nausea and vomiting, unspecified vomiting type. A diagnosis of Immunocompromised patient was also pertinent to this visit.    Health maintenance needs, recent test results and goals of care discussed with pt and questions answered.           Chelsea Collado, APRN, NP-C  [facility] [address] [place] [address]          [1]   Patient Active Problem List  Diagnosis    Heart transplanted    Primary insomnia    Fibromyalgia    Gastroesophageal reflux disease without esophagitis    Essential hypertension    Aortic atherosclerosis    Secondary hyperparathyroidism, renal    Other osteoporosis without current pathological fracture    Immunocompromised patient    Ductal carcinoma in situ (DCIS) of left breast    Immunocompromised state due to drug therapy    Ulnar neuropathy of left upper extremity    Chronic diastolic (congestive) heart failure    Anemia associated with stage 5 chronic renal failure    Secondary and unspecified malignant neoplasm of axilla and upper limb lymph nodes    Chest pain    Acute cystitis without hematuria    Borderline abnormal TFTs    Other hyperlipidemia    DDD (degenerative disc disease), thoracolumbar    Ventral hernia without obstruction or gangrene    Abnormal serum thyroid stimulating hormone (TSH) level    FRANKY (stress urinary incontinence, female)    Prolapse of vaginal cuff after hysterectomy    Pulmonary heart disease    Urge incontinence of urine    Orthopedic device, implant, or graft complication    Bilateral hip pain    Chronic idiopathic constipation    Spinal stenosis of lumbosacral region    Patent foramen ovale    Chronic diastolic congestive heart failure    Primary  osteoarthritis of right shoulder    Prolonged Q-T interval on ECG    CKD (chronic kidney disease) stage 5, GFR less than 15 ml/min    Chronic right-sided thoracic back pain    Hemiparesis affecting right side as late effect of cerebrovascular accident (CVA)    Angina of effort    Rapid palpitations    Spinal stenosis    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    Lymphedema of left arm    Chronic venous insufficiency    Lower extremity edema    Fall   [2]   Current Outpatient Medications on File Prior to Visit   Medication Sig Dispense Refill    aspirin (ECOTRIN) 81 MG EC tablet Take 1 tablet (81 mg total) by mouth once daily.      calcitRIOL (ROCALTROL) 0.25 MCG Cap Take 1 capsule (0.25 mcg total) by mouth once daily. 30 capsule 11    carvediloL (COREG) 3.125 MG tablet Take 1 tablet (3.125 mg total) by mouth 2 (two) times daily with meals. Hold parameters: SBP less than 110 and/or DBP less than 75 180 tablet 3    cycloSPORINE modified, NEORAL, (NEORAL) 25 MG capsule TAKE 3 CAPSULES TWICE DAILY 540 capsule 0    furosemide (LASIX) 40 MG tablet Take 1 tablet (40 mg total) by mouth once daily. 30 tablet 11    LIDOcaine (LIDODERM) 5 % Place 1 patch onto the skin once daily. Remove & Discard patch within 12 hours or as directed by MD 30 patch 2    NIFEdipine (PROCARDIA-XL) 60 MG (OSM) 24 hr tablet Take 1 tablet (60 mg total) by mouth once daily. 30 tablet 11    nitroGLYCERIN (NITROSTAT) 0.4 MG SL tablet PLACE 1 TABLET UNDER THE TONGUE EVERY 5 MINS AS NEEDED FOR CHEST PAIN FOR UP TO 3 DOSES.IF CONTINUED HEART PAIN/PRESSURE AFTER 100 tablet 0    oxyCODONE-acetaminophen (PERCOCET) 5-325 mg per tablet Take 1 tablet by mouth every 4 (four) hours as needed for Pain. 25 tablet 0    pantoprazole (PROTONIX) 20 MG tablet TAKE 1 TABLET ONE TIME DAILY 90 tablet 3    patiromer calcium sorbitex (VELTASSA) 8.4 gram PwPk Take 1 packet (8.4 g total) by mouth once daily. 90 packet 0    polyethylene glycol  (GLYCOLAX) 17 gram PwPk Take 17 g by mouth once daily.      pregabalin (LYRICA) 50 MG capsule Take 1 capsule (50 mg total) by mouth every evening. (Patient taking differently: Take 50 mg by mouth 2 (two) times daily.) 90 capsule 0    sodium bicarbonate 650 MG tablet Take 1 tablet (650 mg total) by mouth 2 (two) times daily. 60 tablet 0    temazepam (RESTORIL) 30 mg capsule Take 1 capsule (30 mg total) by mouth nightly as needed for Insomnia. FOR INSOMNIA 30 capsule 5    tiZANidine (ZANAFLEX) 2 MG tablet Take 2 tablets (4 mg total) by mouth 2 (two) times a day. 120 tablet 5    vitamin E 400 UNIT capsule Take 400 Units by mouth once daily.      zolpidem (AMBIEN) 5 MG Tab Take 1 tablet (5 mg total) by mouth every evening. 30 tablet 5    [DISCONTINUED] ondansetron (ZOFRAN) 4 MG tablet Take 4 mg by mouth as needed for Nausea.       No current facility-administered medications on file prior to visit.

## 2025-02-25 ENCOUNTER — RESULTS FOLLOW-UP (OUTPATIENT)
Dept: PRIMARY CARE CLINIC | Facility: CLINIC | Age: 70
End: 2025-02-25
Payer: MEDICARE

## 2025-02-25 NOTE — PROGRESS NOTES
Pt does not use [facility] pt portal - please call w message below:    Thyroid tests are stable - TSH still a little elevated but T3 and freeT4 are normal.  Recommend continue monitor every 6 mos or so.

## 2025-02-26 ENCOUNTER — TELEPHONE (OUTPATIENT)
Dept: PRIMARY CARE CLINIC | Facility: CLINIC | Age: 70
End: 2025-02-26
Payer: MEDICARE

## 2025-02-26 ENCOUNTER — TELEPHONE (OUTPATIENT)
Dept: PAIN MEDICINE | Facility: CLINIC | Age: 70
End: 2025-02-26
Payer: MEDICARE

## 2025-02-26 ENCOUNTER — OFFICE VISIT (OUTPATIENT)
Dept: HEMATOLOGY/ONCOLOGY | Facility: CLINIC | Age: 70
End: 2025-02-26
Payer: MEDICARE

## 2025-02-26 ENCOUNTER — INFUSION (OUTPATIENT)
Dept: INFUSION THERAPY | Facility: HOSPITAL | Age: 70
End: 2025-02-26
Attending: INTERNAL MEDICINE
Payer: MEDICARE

## 2025-02-26 VITALS
RESPIRATION RATE: 18 BRPM | TEMPERATURE: 98 F | HEART RATE: 91 BPM | DIASTOLIC BLOOD PRESSURE: 91 MMHG | OXYGEN SATURATION: 97 % | SYSTOLIC BLOOD PRESSURE: 155 MMHG

## 2025-02-26 VITALS
DIASTOLIC BLOOD PRESSURE: 91 MMHG | TEMPERATURE: 98 F | SYSTOLIC BLOOD PRESSURE: 155 MMHG | BODY MASS INDEX: 27.2 KG/M2 | OXYGEN SATURATION: 97 % | WEIGHT: 147.81 LBS | HEIGHT: 62 IN | HEART RATE: 91 BPM

## 2025-02-26 DIAGNOSIS — N18.5 ANEMIA ASSOCIATED WITH STAGE 5 CHRONIC RENAL FAILURE: Primary | Chronic | ICD-10-CM

## 2025-02-26 DIAGNOSIS — N18.5 ANEMIA ASSOCIATED WITH STAGE 5 CHRONIC RENAL FAILURE: ICD-10-CM

## 2025-02-26 DIAGNOSIS — D05.12 DUCTAL CARCINOMA IN SITU (DCIS) OF LEFT BREAST: ICD-10-CM

## 2025-02-26 DIAGNOSIS — M81.8 OTHER OSTEOPOROSIS WITHOUT CURRENT PATHOLOGICAL FRACTURE: Primary | ICD-10-CM

## 2025-02-26 DIAGNOSIS — D63.1 ANEMIA ASSOCIATED WITH STAGE 5 CHRONIC RENAL FAILURE: Primary | Chronic | ICD-10-CM

## 2025-02-26 DIAGNOSIS — D63.1 ANEMIA ASSOCIATED WITH STAGE 5 CHRONIC RENAL FAILURE: ICD-10-CM

## 2025-02-26 PROCEDURE — 99999 PR PBB SHADOW E&M-EST. PATIENT-LVL IV: CPT | Mod: PBBFAC,HCNC,, | Performed by: NURSE PRACTITIONER

## 2025-02-26 PROCEDURE — 96372 THER/PROPH/DIAG INJ SC/IM: CPT | Mod: HCNC

## 2025-02-26 PROCEDURE — 63600175 PHARM REV CODE 636 W HCPCS: Mod: JZ,EC,TB,HCNC | Performed by: NURSE PRACTITIONER

## 2025-02-26 RX ADMIN — EPOETIN ALFA-EPBX 20000 UNITS: 20000 INJECTION, SOLUTION INTRAVENOUS; SUBCUTANEOUS at 02:02

## 2025-02-26 NOTE — PROGRESS NOTES
Subjective:       Patient ID: Nadia Damon is a 69 y.o. female.    Chief Complaint: review labs. Consideration of Retacrit    HPI: 69 y.o female with h/o of CVA 2024, heart transplant in  (on anti-rejection medication), CKD V, triple negative breast ca with lymph node involovement. Initiation of chemotherapy 2021 (Taxotere/Cytoxan) with Udenyca 2021. Unfortunately chemotherapy was complicated by pancytopenia and neutropenic fever resulting in hospitalization x 2. Patient decided on no further chemotherapy. S/p radiation completed 2022    10/31/2022 underwent right breast biopsy due to abnormal findings on mammogram with benign pathology. She continues follow up with breast cancer survivorship     Today she is here for lab review and consideration for EPO therapy for anemia of CKD. She notes planning dialysis shunt soon.  Social History     Socioeconomic History    Marital status: Single    Number of children: 2    Highest education level: 11th grade   Occupational History    Occupation: Retired   Tobacco Use    Smoking status: Never     Passive exposure: Never    Smokeless tobacco: Never   Substance and Sexual Activity    Alcohol use: Never     Alcohol/week: 0.0 standard drinks of alcohol    Drug use: No    Sexual activity: Not Currently     Partners: Male     Birth control/protection: See Surgical Hx   Other Topics Concern    Are you pregnant or think you may be? No    Breast-feeding No   Social History Narrative    Single. 2 children , 1  at 31 yoa  2014 strep throat -  pneumonia and renal complications after not completing course of AB. Other child lives in [place], [place]. Has a cousin locally that could help in an emergency. 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 Caffeine intake =- 1 cola a day. No coffee, + occasional tea, avoids caffeine especially at night. Still drives. She does not have a Living Will or Advanced directive.      Social Drivers of  Health     Financial Resource Strain: Low Risk  (1/7/2025)    Overall Financial Resource Strain (CARDIA)     Difficulty of Paying Living Expenses: Not hard at all   Food Insecurity: No Food Insecurity (1/7/2025)    Hunger Vital Sign     Worried About Running Out of Food in the Last Year: Never true     Ran Out of Food in the Last Year: Never true   Recent Concern: Food Insecurity - Food Insecurity Present (11/13/2024)    Hunger Vital Sign     Worried About Running Out of Food in the Last Year: Never true     Ran Out of Food in the Last Year: Sometimes true   Transportation Needs: No Transportation Needs (1/7/2025)    PRAPARE - Transportation     Lack of Transportation (Medical): No     Lack of Transportation (Non-Medical): No   Physical Activity: Inactive (1/7/2025)    Exercise Vital Sign     Days of Exercise per Week: 0 days     Minutes of Exercise per Session: 0 min   Stress: Stress Concern Present (1/7/2025)    [employer] - Occupational Stress Questionnaire     Feeling of Stress : To some extent   Housing Stability: Unknown (1/7/2025)    Housing Stability Vital Sign     Unable to Pay for Housing in the Last Year: No     Homeless in the Last Year: No       Past Medical History:   Diagnosis Date    Abdominal wall hernia     CT Renal 6/11/2018---Small fat containing superior ventral abdominal wall hernia at the epicardial pacing lead site.    Abnormal mammogram 10/12/2021    Acute cystitis without hematuria 05/10/2022    Acute ischemic right middle cerebral artery (MCA) stroke 07/20/2024    Adverse drug reaction 11/12/2024    GRACE (acute kidney injury) 11/22/2021    Anxiety     Aphasia 07/19/2024    Arthritis     ZEN HIPS    Breast cancer in female 08/2021    LEFT BREAST    Breast mass, right 11/13/2024    RIGHT BREAST MASS (Problem)      Bronchitis 08/18/2016    Never smoked      C. difficile colitis 11/29/2021    Cellulitis of axilla, left 12/23/2021    Chronic diastolic heart failure  12/16/2021    Chronic kidney disease     stage 4, GFR 15-29 ml/min    Chronic midline low back pain without sciatica 06/18/2018    CKD (chronic kidney disease) stage 4, GFR 15-29 ml/min 04/20/2016    US Retro   5/16/2018---Mild cortical thinning and increased cortical echogenicity.  Findings can be seen with medical renal disease.  8/31/216--- Echogenic kidneys with diffuse cortical thinning suggesting  medical renal disease. 2 complex right renal cortical cysts.      Closed nondisplaced fracture of distal phalanx of left great toe with routine healing 10/22/2018    Coronary artery disease 1993    heart transplant    COVID-19 in immunocompromised patient 02/26/2024    Cystitis 05/10/2022    Depression     Encounter for blood transfusion     Encounter for palliative care 10/14/2024    Fibromyalgia     on Lyrica    Heart failure     native heart cardiomyopathy    Heart transplanted 1993    due to cardiomyopathy    History of hyperparathyroidism; Hyperparathyroidism, secondary renal     PT DENIES    Hypertension     Immune disorder     anti rejection meds    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    Impaired mobility 07/28/2022    Iron deficiency anemia 08/15/2017    Kidney stones     passed per pt    Obesity     Obesity (BMI 30.0-34.9) 07/22/2019    Other osteoporosis without current pathological fracture 08/30/2019    Other pancytopenia 05/06/2024    Parotitis, acute 09/19/2023    Pharyngitis 01/09/2019    Pneumonia due to infectious organism 03/14/2024    PONV (postoperative nausea and vomiting)     Severe sepsis 11/22/2021    Shingles 2003 approx    left leg    Stage 4 chronic kidney disease 11/22/2021    Subclinical hypothyroidism 06/16/2023    Thrombocytopenia, unspecified 11/29/2021    Trouble in sleeping     Unresponsiveness 11/13/2024    Urinary incontinence        Family History   Problem Relation Name Age of Onset    Cancer Mother  38        breast    Breast cancer Mother      Breast  cancer Maternal Grandmother      Heart disease Maternal Grandmother      Hypertension Son      Cataracts Cousin      Diabetes Neg Hx      Stroke Neg Hx      Kidney disease Neg Hx      Asthma Neg Hx      COPD Neg Hx      Melanoma Neg Hx      Hyperlipidemia Neg Hx         Past Surgical History:   Procedure Laterality Date    bladder implant Right 06/11/2024    BLADDER SURGERY  2015 approx    mesh - Dr Everett then 2nd reconstructive sx Dr Onofre    BREAST BIOPSY Bilateral     NEGATIVE    BREAST BIOPSY Right 10/31/2022    benign    BREAST LUMPECTOMY Left 2021    BREAST SURGERY Left 09/28/2015    Bx - benign    BREAST SURGERY Right 12/2015    Bx benign    CARDIAC PACEMAKER REMOVAL Left 06/26/2014    Pacer defirillator removed. Put in 1993 aat time of heart transplant    CARPAL TUNNEL RELEASE Left 03/03/2015    Dr. Hall    COLONOSCOPY N/A 02/25/2021    Procedure: COLONOSCOPY;  Surgeon: Freida Ramirez MD;  Location: [facility];  Service: Endoscopy;  Laterality: N/A;    CYSTOCELE REPAIR      Twice with mesh removal    EPIDURAL STEROID INJECTION INTO CERVICAL SPINE N/A 02/02/2023    Procedure: T11/T12 IL HELLEN;  Surgeon: Jassi Pierre MD;  Location: [facility]IN [facility]GT;  Service: Pain Management;  Laterality: N/A;    EPIDURAL STEROID INJECTION INTO CERVICAL SPINE N/A 9/16/2024    Procedure: T11/T12 IL HELLEN;  Surgeon: Jassi Pierre MD;  Location: [facility];  Service: Pain Management;  Laterality: N/A;    HEART TRANSPLANT  1993    HERNIA REPAIR Right 1971 approx    Inguinal    HYSTERECTOMY  1983    vag hyst /LSO     IMPLANTATION OF PERMANENT SACRAL NERVE STIMULATOR N/A 06/11/2024    Procedure: INSERTION, NEUROSTIMULATOR, PERMANENT, SACRAL;  Surgeon: Sami Cochran MD;  Location: [facility];  Service: Urology;  Laterality: N/A;    INCISION AND DRAINAGE OF ABSCESS Left 12/24/2021    Procedure: INCISION AND DRAINAGE, ABSCESS;  Surgeon: Joseph Longo MD;  Location: [facility];  Service: General;  Laterality: Left;     INJECTION OF ANESTHETIC AGENT AROUND MEDIAL BRANCH NERVES INNERVATING LUMBAR FACET JOINT Right 10/19/2022    Procedure: Right L4/L5 and L5/S1 MBB;  Surgeon: Jassi Pierre MD;  [facility];  Service: Pain Management;  Laterality: Right;    INJECTION OF ANESTHETIC AGENT AROUND MEDIAL BRANCH NERVES INNERVATING LUMBAR FACET JOINT Right 11/09/2022    Procedure: Right L4/L5 and L5/S1 MBB;  Surgeon: Jassi Pierre MD;  Location: [facility]IN [facility]GT;  Service: Pain Management;  Laterality: Right;    INJECTION OF ANESTHETIC AGENT AROUND MEDIAL BRANCH NERVES INNERVATING LUMBAR FACET JOINT Left 2/6/2025    Procedure: Left L4-5 and L5-S1 MBB #1 with local;  Surgeon: Jassi Pierre MD;  [facility];  Service: Pain Management;  Laterality: Left;    INJECTION OF ANESTHETIC AGENT INTO SACROILIAC JOINT Right 08/22/2022    Procedure: Right SIJ Injection Right L5/S1 Facte Injection;  Surgeon: Jassi Pierre MD;  Location: [facility];  Service: Pain Management;  Laterality: Right;    INSERTION OF TUNNELED CENTRAL VENOUS CATHETER (CVC) WITH SUBCUTANEOUS PORT N/A 11/09/2021    Procedure: [id number];  Surgeon: Christoph Douglas MD;  Location: [facility] OR;  Service: General;  Laterality: N/A;    RADIOFREQUENCY ABLATION Right 12/5/2024    Procedure: Right L4-5 and L5-S1 RFA;  Surgeon: Jassi Pierre MD;  Location: [facility];  Service: Pain Management;  Laterality: Right;    RADIOFREQUENCY THERMOCOAGULATION Right 12/07/2022    Procedure: Right L4/L5 and L5/S1 Lumbar RFA;  Surgeon: Jassi Pierre MD;  Location: [facility];  Service: Pain Management;  Laterality: Right;    REMOVAL OF ELECTRODE LEAD OF SACRAL NERVE STIMULATOR N/A 2/18/2025    Procedure: REMOVAL, ELECTRODE LEAD, SACRAL NERVE STIMULATOR;  Surgeon: Sami Cochran MD;  Location: [facility] OR;  Service: Urology;  Laterality: N/A;    REMOVAL OF VASCULAR ACCESS PORT      ROBOT-ASSISTED LAPAROSCOPIC ABDOMINAL SACROCOLPOPEXY N/A  08/10/2023    Procedure: ROBOTIC SACROCOLPOPEXY, ABDOMEN;  Surgeon: PRANAY Villalobos MD;  Location: [facility] OR;  Service: OB/GYN;  Laterality: N/A;    ROBOT-ASSISTED LAPAROSCOPIC OOPHORECTOMY Right 08/10/2023    Procedure: ROBOTIC OOPHORECTOMY;  Surgeon: PRANAY Villalobos MD;  Location: [facility] OR;  Service: OB/GYN;  Laterality: Right;    SENTINEL LYMPH NODE BIOPSY Left 10/12/2021    Procedure: BIOPSY, LYMPH NODE, SENTINEL;  Surgeon: Christoph Douglas MD;  Location: [facility] OR;  Service: General;  Laterality: Left;    TOE SURGERY      XI ROBOTIC URETHROPEXY N/A 08/10/2023    Procedure: XI ROBOTIC URETHROPEXY;  Surgeon: PRANAY Villalobos MD;  Location: [facility] OR;  Service: OB/GYN;  Laterality: N/A;       Review of Systems   Constitutional:  Negative for activity change, appetite change, chills, fatigue, fever and unexpected weight change.   HENT:  Negative for congestion, mouth sores, nosebleeds, sore throat, trouble swallowing and voice change.    Eyes:  Negative for visual disturbance.   Respiratory:  Negative for cough, chest tightness, shortness of breath and wheezing.    Cardiovascular:  Negative for chest pain, palpitations and leg swelling.   Gastrointestinal:  Negative for abdominal distention, abdominal pain, anal bleeding, blood in stool, constipation, diarrhea, nausea and vomiting.   Genitourinary:  Negative for difficulty urinating, dysuria and hematuria.   Musculoskeletal:  Positive for back pain (sees pain management). Negative for arthralgias and myalgias. Gait problem: utizlies cane, s/p CVA 7/2024.       Breast pain s/p mammogram   Skin:  Negative for pallor, rash and wound.   Neurological:  Negative for dizziness, syncope, weakness and headaches.   Hematological:  Negative for adenopathy. Does not bruise/bleed easily.   Psychiatric/Behavioral:  The patient is nervous/anxious.          Medication List with Changes/Refills   Current Medications    ASPIRIN (ECOTRIN) 81 MG EC TABLET    Take 1 tablet (81 mg  total) by mouth once daily.    CALCITRIOL (ROCALTROL) 0.25 MCG CAP    Take 1 capsule (0.25 mcg total) by mouth once daily.    CARVEDILOL (COREG) 3.125 MG TABLET    Take 1 tablet (3.125 mg total) by mouth 2 (two) times daily with meals. Hold parameters: SBP less than 110 and/or DBP less than 75    CYCLOSPORINE MODIFIED, NEORAL, (NEORAL) 25 MG CAPSULE    TAKE 3 CAPSULES TWICE DAILY    FUROSEMIDE (LASIX) 40 MG TABLET    Take 1 tablet (40 mg total) by mouth once daily.    LIDOCAINE (LIDODERM) 5 %    Place 1 patch onto the skin once daily. Remove & Discard patch within 12 hours or as directed by MD    MUPIROCIN (BACTROBAN) 2 % OINTMENT    Apply topically 2 (two) times daily.    NIFEDIPINE (PROCARDIA-XL) 60 MG (OSM) 24 HR TABLET    Take 1 tablet (60 mg total) by mouth once daily.    NITROGLYCERIN (NITROSTAT) 0.4 MG SL TABLET    PLACE 1 TABLET UNDER THE TONGUE EVERY 5 MINS AS NEEDED FOR CHEST PAIN FOR UP TO 3 DOSES.IF CONTINUED HEART PAIN/PRESSURE AFTER    ONDANSETRON (ZOFRAN) 4 MG TABLET    Take 1 tablet (4 mg total) by mouth as needed for Nausea.    OXYCODONE-ACETAMINOPHEN (PERCOCET) 5-325 MG PER TABLET    Take 1 tablet by mouth every 4 (four) hours as needed for Pain.    PANTOPRAZOLE (PROTONIX) 20 MG TABLET    TAKE 1 TABLET ONE TIME DAILY    PATIROMER CALCIUM SORBITEX (VELTASSA) 8.4 GRAM PWPK    Take 1 packet (8.4 g total) by mouth once daily.    POLYETHYLENE GLYCOL (GLYCOLAX) 17 GRAM PWPK    Take 17 g by mouth once daily.    PREGABALIN (LYRICA) 50 MG CAPSULE    Take 1 capsule (50 mg total) by mouth every evening.    SODIUM BICARBONATE 650 MG TABLET    Take 1 tablet (650 mg total) by mouth 2 (two) times daily.    TEMAZEPAM (RESTORIL) 30 MG CAPSULE    Take 1 capsule (30 mg total) by mouth nightly as needed for Insomnia. FOR INSOMNIA    TIZANIDINE (ZANAFLEX) 2 MG TABLET    Take 2 tablets (4 mg total) by mouth 2 (two) times a day.    VITAMIN E 400 UNIT CAPSULE    Take 400 Units by mouth once daily.    ZOLPIDEM (AMBIEN) 5  MG TAB    Take 1 tablet (5 mg total) by mouth every evening.     Objective:     Vitals:    02/26/25 1318   BP: (!) 155/91   Pulse: 91   Temp: 98.2 °F (36.8 °C)     Lab Results   Component Value Date    WBC 3.62 (L) 02/24/2025    WBC 3.62 (L) 02/24/2025    HGB 9.1 (L) 02/24/2025    HGB 9.1 (L) 02/24/2025    HCT 29.2 (L) 02/24/2025    HCT 29.2 (L) 02/24/2025    MCV 92 02/24/2025    MCV 92 02/24/2025     02/24/2025     02/24/2025       BMP  Lab Results   Component Value Date     02/24/2025     02/24/2025    K 4.7 02/24/2025    K 4.7 02/24/2025     02/24/2025     02/24/2025    CO2 21 (L) 02/24/2025    CO2 21 (L) 02/24/2025    BUN 37 (H) 02/24/2025    BUN 37 (H) 02/24/2025    CREATININE 4.1 (H) 02/24/2025    CREATININE 4.1 (H) 02/24/2025    CALCIUM 9.7 02/24/2025    CALCIUM 9.7 02/24/2025    ANIONGAP 16 02/24/2025    ANIONGAP 16 02/24/2025    ESTGFRAFRICA 19 (A) 07/14/2022    EGFRNONAA 17 (A) 07/14/2022     Lab Results   Component Value Date    ALT 28 02/24/2025    AST 42 (H) 02/24/2025    ALKPHOS 120 02/24/2025    BILITOT 0.4 02/24/2025     Lab Results   Component Value Date    IRON 64 02/24/2025    TIBC 317 02/24/2025    FERRITIN 100 02/24/2025       Physical Exam  Vitals reviewed.   Constitutional:       Appearance: She is well-developed.   HENT:      Head: Normocephalic.      Right Ear: External ear normal.      Left Ear: External ear normal.   Eyes:      General: Lids are normal. No scleral icterus.        Right eye: No discharge.         Left eye: No discharge.      Conjunctiva/sclera: Conjunctivae normal.   Neck:      Thyroid: No thyroid mass.   Cardiovascular:      Rate and Rhythm: Normal rate and regular rhythm.      Heart sounds: Normal heart sounds.   Pulmonary:      Effort: Pulmonary effort is normal. No respiratory distress.   Abdominal:      General: Bowel sounds are normal. There is no distension.      Palpations: Abdomen is soft.   Genitourinary:     Comments:  deferred  Musculoskeletal:         General: Normal range of motion.      Cervical back: Normal range of motion.      Comments: Mild BLE edema   Skin:     General: Skin is warm and dry.   Neurological:      Mental Status: She is alert and oriented to person, place, and time.   Psychiatric:         Speech: Speech normal.         Behavior: Behavior normal. Behavior is cooperative.         Thought Content: Thought content normal.        Assessment:     Problem List Items Addressed This Visit          Oncology    Anemia associated with stage 5 chronic renal failure - Primary (Chronic)    Hg 9.1. Iron levels adequate    Proceed with Retacrit today. Repeat in 2 and 4 weeks. F/u 6 weeks with CBC for consideration of Retacrit if hg <10         Relevant Orders    CBC Auto Differential    Ductal carcinoma in situ (DCIS) of left breast    Continue f/u with breast cancer survivorship              Plan:     Anemia associated with stage 5 chronic renal failure  -     CBC Auto Differential  -     Iron and TIBC  -     Ferritin  -     CBC Auto Differential; Future; Expected date: 02/26/2025    Ductal carcinoma in situ (DCIS) of left breast      Route Chart for Scheduling    Med Onc Chart Routing      Follow up with physician    Follow up with LIA 6 weeks.   Infusion scheduling note    Injection scheduling note retacrit 2 and 4 weeks. possible retacrit 6 weeks   Labs CBC   Scheduling:  Preferred lab:  Lab interval:     Imaging None      Pharmacy appointment No pharmacy appointment needed      Other referrals       No additional referrals needed         Therapy Plan Information  INF EPOETIN TRISTAN (RETACRIT) INITIAL DOSES for Other osteoporosis without current pathological fracture, noted on 8/30/2019  INF EPOETIN TRISTAN (RETACRIT) INITIAL DOSES for Anemia associated with stage 5 chronic renal failure, noted on 11/19/2024  epoetin tristan-epbx injection 20,000 Units  20,000 Units, Subcutaneous, PRN      No therapy plan of the specified type  found.    No therapy plan of the specified type found.          Yudith Mendoza, RUDY-C

## 2025-02-26 NOTE — TELEPHONE ENCOUNTER
Please let her know there was nothing concerning with the hernia.  There was no bowel blockage according to the CT scan.     ONELIA Armstrong

## 2025-02-26 NOTE — TELEPHONE ENCOUNTER
----- Message from Elis Wick MD sent at 2/25/2025  5:02 AM CST -----  Pt does not use [facility] pt portal - please call w message below:    Thyroid tests are stable - TSH still a little elevated but T3 and freeT4 are normal.  Recommend continue monitor every 6 mos or so.  ----- Message -----  From: Alonso, [employer] [facility] Interface  Sent: 2/24/2025  11:10 AM CST  To: Elis Wick MD

## 2025-02-26 NOTE — TELEPHONE ENCOUNTER
----- Message from Noman sent at 2/26/2025 10:10 AM CST -----  Contact: TICO LIANG [[id number]]  ..Type:  Patient Requesting CallWho Called:TICO LIANG [[id number]]Does the patient know what this is regarding?:pt is calling to dicuss the results for the ct Would the patient rather a call back or a response via [facility]? callGemisimo Call Back Number:.[phone] (home) Additional Information:

## 2025-02-26 NOTE — ASSESSMENT & PLAN NOTE
Hg 9.1. Iron levels adequate    Proceed with Retacrit today. Repeat in 2 and 4 weeks. F/u 6 weeks with CBC for consideration of Retacrit if hg <10

## 2025-02-26 NOTE — TELEPHONE ENCOUNTER
Spoke with patient regarding her message for CT results.  Informed patient she has an appt on 3/4/25 with Corin Lincoln to review CT scan.  Patient verbalized understanding

## 2025-02-27 NOTE — PROGRESS NOTES
DATE:  2025  REFERRAL SOURCE:  Elis Wick MD  TYPE OF VISIT:  In person  LENGTH OF SESSION: 60  .  HISTORY OF PRESENTING ILLNESS:  Nadia Damon, a 69 y.o. female with history of Anxiety disorders; anxiety, unspecified [F41.9], Major Depressive Disorder, Recurrent, Moderate (F33.1), and Persistent insomnia with other symptoms [G47.00].       CHIEF COMPLAINT/REASON FOR ENCOUNTER: Pt's chief complaint includes the following:  sleep, and grief.    PSYCHIATRIC HISTORY:  Patient does not currently have a psychiatrist.    Patient does not currently have a therapist.     Pt is taking temazepam (Restoril) 30mg once daily and zolpidem (Ambien) 0.5mg once daily for sleep.  They are not interested in medication changes.  Previous Psychiatric Hospitalizations:  No  History of Trauma:  Heart transplant; CVA.    History of Violence:  No  Access to a gun/weapon:  No  Previous Suicide Attempts:  No    Risk assessment:  Patient reports no suicidal ideation  Patient reports no homicidal ideation  Patient reports no self-injurious behavior  Patient reports no violent behavior    PSYCHIATRIC FAMILY HISTORY:  Parents were alcoholic; SonMoy is an alcoholic.  Grandson has hx of depression      SOCIAL HISTORY (MARRIAGE, EMPLOYMENT, etc.):  Living Situation: Alone, at [facility].   Relationship status Single; never .   Children/Family: 2 sons. 1 sonFlakito, is .  SonMoy Jr lives in [place] with wife.  Estranged granddtrQiana.  Estranged grandsonMoy.  Cousin.   Supports:Restorationism friends.   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  Cheondoism/Spirituality: Gnosticism - Living [place] [employer]. Volunteers as a .   Hobbies and Interests: Crafting.     Current social stressors:   CVA 2024.  Loss of driving.CKD 4. May need dialysis soon.  Hx of heart transplant in .  Transplanted heart now at [facility].   Grandson's arrest in 2024; news  "coverage of his arrest.    Death of son, Flakito, age 30.  Loss of contact with Flakito's daughter, Qiana (or Alda Kiran).       Current symptoms:  Depression: decreased appetite.  Anxiety: excessive worrying, restlessness, and obsessions/compulsions.  Insomnia:  chronic insomnia. Years of dealing with this.  .  Astrid:  pressured speech.  Psychosis: denies .    MENTAL HEALTH STATUS EXAM  General Appearance:  unremarkable, age appropriate   Speech: normal pitch, normal volume, pressured, rapid      Level of Cooperation: cooperative      Thought Processes: normal and logical   Mood: steady      Thought Content: normal, no suicidality, no homicidality, delusions, or paranoia   Affect: congruent and appropriate   Orientation: Oriented x3   Memory: Appears WNL; patient not tested.    Attention Span & Concentration: intact   Fund of General Knowledge: intact and appropriate to age and level of education   Judgment & Insight: good   Language  intact     Session Content/Presenting Problem Hx:  November 2024: PHQ 13; LUPE 15  February 2025 PHQ/LUPE: 16/16     From initial assessment: She says "I go up up up then will cry half the day." Patient describes herself as someone who will try to push through any obstacles; she tries not to let things get her down. Ends up feeling overwhelmed. Fear of being judged by others.    5th session. Discussed in more detail with patient how her days go regarding her housework and her need to get things done. Patient describes needing things to be just right.  Cannot stand disorder.  Must dust every day. Must say a long list of prayers every day.  Patient habitually tells people no when they offer to help her in anyway.  She pushes herself to exhaustion despite being in physical pain.   Patient describes her thoughts as: "If I don't do these things (clean, decorate) then I will hurt even more. I have to keep going and doing because if I sit I will hurt more. "  She is aware that she has hurt " "herself because her mind kept telling her to keep going even when she knew she was exhausted or hurting or just not safe.  This is how she has fallen on numerous occasions.   Patient says she deliberately will not take pain medicine because she does not want to feel sleepy and tired; she does not want the medicine to help her rest.   Patient describes friends and family continually asking her to please stop and to let them help.  They encourage her to sit and to rest.  Patient is aware that by saying no she is pushing people away.   She says "I want to stop doing this, I want to let others help me, I want to rest, I am so tired."  Patient describes fighting off sleep; always thinking of something else she can do.  She does not sleep in her bed.  She sleeps in a chair.  Says she has tried a few times to sleep in her bed but she cannot relax with trying to keep the covers perfect; she will try to sleep on "an inch" of the bed.   Patient discusses her time at rehab last year after her stroke. Even then she would not stop and rest despite the staff urging her to lie down.  She spent time cleaning the room and the bathroom. Patient describes an overwhelming need to have order.    Patient expresses a desire to slow down and relax but also expresses feeling unable to do that.    Empathy and support are provided as Nadia described her pattern of anxious thoughts and compulsive behaviors.  Focus is on building a level of trust with Nadia through active listening, unconditional positive regard and acceptance.   Nadia appears to show symptoms of OCPD: preoccupation with order and details; perfectionism; devotion to activity and productivity; fear of being perceived as a failure; refusing to work with others or to delegate tasks; and becoming overly fixated on a task.  Her inability to change her behaviors or to accept help from others has negatively affected her relationships with her family and her friends.  It has also " "affected her physical health.   Patient agrees that LCSW may discuss her symptoms with the MD and the Psych PA for possible medication recommendations. She notes that she was previously on Cymbalta but that was discontinued.          Prior Session:   4th session.  Patient discusses her need to stay busy. She acknowledges pushing herself to the point of exhaustion.  Describes spending hours cleaning, baking, crafting.  She plans parties at her intermediate community and handles all of the details herself.  Last year she experienced a stroke while decorating for her own 69th birthday party at the facility.  She has fallen off ladders while decorating late at night.    Patient says "I am running on fumes."  Describes a recent event where she did all of the planning, decorating and baking for a party; she fell the night before, while decorating the room at 3 a.m.  During the party she stood the entire time and she did not eat.  She refused to sit when others encouraged her to rest and to eat. She acknowledges actually stumbling while trying to dance at the party.  Patient appears to have little insight into her need to push herself so hard.  She acknowledges being tired; she hopes to improve her sleep.  Says she has trouble sleeping soundly.   When asked why she pushes herself so hard, patient says she gets happiness from doing for others; she expresses feeling driven to keep going despite the risk of hurting herself.  Discussed her health journey. She will have her dialysis port placed soon; she expresses confidence in the plan to being dialysis.  She is happy with her new renal doctor. She feels very supported by her  and her Confucianist family; they have all prayed with her and she receives texts from her  and from Confucianist members regularly.     Patient notes some recent improvement in her family relationships as well.  Talking with her son regularly; she is hopeful that he will visit soon.  She expresses concern " about her grandson; he is losing his eyesight.  She worries that his mental health is not stable. She believes both her son and grandson have had episodes of depression. Her son has struggled with alcohol abuse and has lost jobs due to drinking. She believes he is doing better now.       STRENGTHS AND LIABILITIES: Strength: Patient is expressive/articulate., Strength: Patient is motivated for change., Strength: Patient has positive support network.    IMPRESSION:   My diagnostic impression is Anxiety disorders; generalized anxiety disorder [F41.1], MAJOR DEPRESSIVE DISORDER, SINGLE EPISODE, Severe Without Psychotic Features (F32.2), and complicated grief , as evidenced by patient's description of increased feelings of anhedonia, insomnia, psychomotor agitation, feeling bad about herself, excessive worry; feeling down and hopeless. She describes ongoing sadness related to loss of her son, and estrangement from a grandson and granddaughter,as well as her son Moy's alcoholism.      TREATMENT GOALS: Anxiety: reducing negative automatic thoughts, reducing physical symptoms of anxiety, and reducing time spent worrying (<30 minutes/day)  Depression: increasing self-reward for positive behaviors (one/day), increasing self-reward for positive thoughts (one/day), and reducing fatigue  Grief: process grief related to son's death, estrangement from a grandson and a granddaughter.     PLAN: In this session a psychosocial evaluation was conducted to get history and determine a treatment plan. CBT will be utilized in future individual  therapy sessions to increase interaction, insight, support, and behavior modification.     NEXT APPOINTMENT:  3/7/25

## 2025-02-28 ENCOUNTER — CLINICAL SUPPORT (OUTPATIENT)
Dept: PRIMARY CARE CLINIC | Facility: CLINIC | Age: 70
End: 2025-02-28
Payer: MEDICARE

## 2025-02-28 ENCOUNTER — PATIENT OUTREACH (OUTPATIENT)
Dept: ADMINISTRATIVE | Facility: OTHER | Age: 70
End: 2025-02-28
Payer: MEDICARE

## 2025-02-28 ENCOUNTER — OFFICE VISIT (OUTPATIENT)
Dept: PRIMARY CARE CLINIC | Facility: CLINIC | Age: 70
End: 2025-02-28
Payer: MEDICARE

## 2025-02-28 VITALS
HEIGHT: 62 IN | DIASTOLIC BLOOD PRESSURE: 70 MMHG | TEMPERATURE: 98 F | WEIGHT: 148.38 LBS | HEART RATE: 80 BPM | OXYGEN SATURATION: 99 % | BODY MASS INDEX: 27.3 KG/M2 | SYSTOLIC BLOOD PRESSURE: 140 MMHG

## 2025-02-28 DIAGNOSIS — F33.1 MODERATE EPISODE OF RECURRENT MAJOR DEPRESSIVE DISORDER: ICD-10-CM

## 2025-02-28 DIAGNOSIS — N18.5 CKD (CHRONIC KIDNEY DISEASE) STAGE 5, GFR LESS THAN 15 ML/MIN: Chronic | ICD-10-CM

## 2025-02-28 DIAGNOSIS — T84.9XXA ORTHOPEDIC DEVICE, IMPLANT, OR GRAFT COMPLICATION: ICD-10-CM

## 2025-02-28 DIAGNOSIS — Z94.1 HEART TRANSPLANTED: Chronic | ICD-10-CM

## 2025-02-28 DIAGNOSIS — R79.89 ABNORMAL SERUM THYROID STIMULATING HORMONE (TSH) LEVEL: Chronic | ICD-10-CM

## 2025-02-28 DIAGNOSIS — Z13.31 POSITIVE DEPRESSION SCREENING: Primary | ICD-10-CM

## 2025-02-28 DIAGNOSIS — D63.1 ANEMIA ASSOCIATED WITH STAGE 5 CHRONIC RENAL FAILURE: Chronic | ICD-10-CM

## 2025-02-28 DIAGNOSIS — F51.01 PRIMARY INSOMNIA: ICD-10-CM

## 2025-02-28 DIAGNOSIS — F41.9 ANXIETY: ICD-10-CM

## 2025-02-28 DIAGNOSIS — K43.9 VENTRAL HERNIA WITHOUT OBSTRUCTION OR GANGRENE: Chronic | ICD-10-CM

## 2025-02-28 DIAGNOSIS — K59.04 CHRONIC IDIOPATHIC CONSTIPATION: Chronic | ICD-10-CM

## 2025-02-28 DIAGNOSIS — M81.8 OTHER OSTEOPOROSIS WITHOUT CURRENT PATHOLOGICAL FRACTURE: Chronic | ICD-10-CM

## 2025-02-28 DIAGNOSIS — I10 ESSENTIAL HYPERTENSION: Chronic | ICD-10-CM

## 2025-02-28 DIAGNOSIS — N18.5 ANEMIA ASSOCIATED WITH STAGE 5 CHRONIC RENAL FAILURE: Chronic | ICD-10-CM

## 2025-02-28 DIAGNOSIS — F60.5 OBSESSIVE COMPULSIVE PERSONALITY DISORDER: Primary | ICD-10-CM

## 2025-02-28 DIAGNOSIS — N39.3 SUI (STRESS URINARY INCONTINENCE, FEMALE): Chronic | ICD-10-CM

## 2025-02-28 DIAGNOSIS — I27.9 PULMONARY HEART DISEASE: Chronic | ICD-10-CM

## 2025-02-28 DIAGNOSIS — K21.9 GASTROESOPHAGEAL REFLUX DISEASE WITHOUT ESOPHAGITIS: Chronic | ICD-10-CM

## 2025-02-28 PROCEDURE — 99999 PR PBB SHADOW E&M-EST. PATIENT-LVL V: CPT | Mod: PBBFAC,HCNC,,

## 2025-02-28 PROCEDURE — 99499 UNLISTED E&M SERVICE: CPT | Mod: HCNC,S$GLB,,

## 2025-02-28 NOTE — PATIENT INSTRUCTIONS
If you are feeling unwell, we'd like to be the first ones to know here at [facility]! Please give us a call. Same day appointments are our top priority to keep you well and out of the emergency rooms and hospitals. Call [phone] for our direct line. After hours advice is always available. Please call [phone] after hours to speak to the on-call team.      Will discuss w/ Wick and Nephrology about treatment (possibly restarting Cymbalta and Buspar) for depression and anxiety.     Will call with update.

## 2025-02-28 NOTE — ASSESSMENT & PLAN NOTE
On board to proceed w/ dialysis treatment option.   Pending on getting dialysis shunts to right upper extremity

## 2025-02-28 NOTE — ASSESSMENT & PLAN NOTE
Recent procedure to remove orthopedic device stimulator  Received injections int left hip by Dr. Loza

## 2025-02-28 NOTE — ASSESSMENT & PLAN NOTE
Received Retacrit injection on yesterday w/ Heme/oncology  F/u with hematology within 2 and 4 weeks.

## 2025-02-28 NOTE — ASSESSMENT & PLAN NOTE
No complaints   Advise to strain while using restroom  Recommend wearing abdomen binder    CT Abdomen 02/24/25  Impression:     Midline ventral hernia with a 4.3 cm neck containing herniated liver

## 2025-02-28 NOTE — ASSESSMENT & PLAN NOTE
Taking pantoprazole, tolerating well but declining in kidney function  Advise to change diet, sit up after eating

## 2025-02-28 NOTE — PROGRESS NOTES
"Nadia Damon  02/28/2025  [id number]    Elis Wick MD        Visit Type:a scheduled routine follow-up visit    Chief Complaint:  Chief Complaint   Patient presents with    Follow-up     1 month f/u     Medication Problem     Medication change on sleep aid          History of Present Illness    CHIEF COMPLAINT:  Nadia presents today for follow-up.    CARDIOVASCULAR:  She underwent a heart transplant 32 years ago and has a heart murmur, described as a "hole" in her heart, which was present during a previous stroke. She experiences cardiac pain attributed to excessive movement and lack of rest. She currently takes nitroglycerin as needed.    RENAL:  She has bilateral non-functional kidneys and is being prepared for hemodialysis, as peritoneal dialysis is not viable. Vascular access placement in the arm is planned. She takes Vitessa daily for kidney function, which requires morning preparation by mixing in a glass.    POST-OPERATIVE STATUS:  She recently underwent surgery and was instructed to avoid movement, bopping, and lifting during the post-operative period.    CHRONIC PAIN:  She has chronic back pain and underwent nerve ablation on her right side, followed by a steroid injection after rehabilitation. She received steroid injections at T11 and T12 initially, then on the right side, as well as an epidural with steroids.    GASTROINTESTINAL:  She has a midline ventral hernia with a 4x3 cm neck containing herniated liver. CT confirmed the hernia without obstruction. She uses a binder for management and denies associated pain or discomfort. She reports constipation and has restarted Miralax and Metamucil. Her bowel movements are solid and difficult to pass. She reports some fecal incontinence.    HEMATOLOGIC:  She has persistent low hemoglobin levels. She reports inconsistent use of Ritocrine, taking it the previous day before discontinuing.    PSYCHIATRIC:  She reports ongoing depression with difficulty " concentrating and is easily distracted by household tasks while watching television.    SLEEP:  She has difficulty falling asleep but maintains sleep once asleep.    FAMILY HISTORY:  Mother has history of breast cancer requiring mastectomy, muscle removal, and tracheostomy placement. Both parents have history of alcohol use disorder.       Crying when talking about her son and their relationship.     Recent Fall:  []Yes  [x]No  Activity:  []Vigorous []Moderate [x]Sedentary  Appetite:  []Good  [x]Fair  []Poor  Mood: []Stable [x]Anxious [x]Depressed   Insomnia: [x]Yes  []No    Hosp/ED/Urgent Care: 02/18/25--Urology sx    Recent appointments: 02/24/25 [facility] w/ ONELIA Collado--Abd pain   02/26/25 Hematology/Oncology MD Kelly --Lab review & EPO therapy (Retacrit) injection     Upcoming Appointments  Future Appointments       Next 10 Appointments       Date Provider Specialty Appt Notes    3/4/2025 Corin Lincoln NP Pain Medicine CT review    3/7/2025 Génesis Gonzales LCSW Primary Care NEEDS LYFT. LCSW visit.    3/12/2025 Luz Dickson NP Palliative Medicine Luz's ep fu    3/12/2025  Chemotherapy retacrit ow/ngwv    3/24/2025  Lab     3/24/2025  Lab     3/26/2025  Chemotherapy retacrit/ow/ngwv    3/31/2025 Gunner Melgar MD Nephrology EP/RTC/1M/LABS/IDS    4/3/2025  Radiology     4/9/2025  Lab stat              Displaying the next 10 appointments. This patient has additional appointments scheduled.              Review of Systems   Constitutional:  Negative for chills and fever.   HENT:  Negative for congestion, ear pain, sinus pain and sore throat.    Eyes:  Negative for blurred vision and pain.   Respiratory:  Negative for cough and shortness of breath.    Cardiovascular:  Negative for chest pain and leg swelling.   Gastrointestinal:  Negative for abdominal pain, nausea and vomiting.   Genitourinary:  Negative for dysuria.   Musculoskeletal:  Positive for back pain. Negative for falls and myalgias.    Skin:  Negative for itching and rash.   Neurological:  Negative for dizziness, weakness and headaches.   Psychiatric/Behavioral:  Negative for suicidal ideas. The patient has insomnia.        History:  Past Medical History:   Diagnosis Date    Abdominal wall hernia     CT Renal 6/11/2018---Small fat containing superior ventral abdominal wall hernia at the epicardial pacing lead site.    Abnormal mammogram 10/12/2021    Acute cystitis without hematuria 05/10/2022    Acute ischemic right middle cerebral artery (MCA) stroke 07/20/2024    Adverse drug reaction 11/12/2024    GRACE (acute kidney injury) 11/22/2021    Anxiety     Aphasia 07/19/2024    Arthritis     ZEN HIPS    Breast cancer in female 08/2021    LEFT BREAST    Breast mass, right 11/13/2024    RIGHT BREAST MASS (Problem)      Bronchitis 08/18/2016    Never smoked      C. difficile colitis 11/29/2021    Cellulitis of axilla, left 12/23/2021    Chronic diastolic heart failure 12/16/2021    Chronic kidney disease     stage 4, GFR 15-29 ml/min    Chronic midline low back pain without sciatica 06/18/2018    CKD (chronic kidney disease) stage 4, GFR 15-29 ml/min 04/20/2016    US Retro   5/16/2018---Mild cortical thinning and increased cortical echogenicity.  Findings can be seen with medical renal disease.  8/31/216--- Echogenic kidneys with diffuse cortical thinning suggesting  medical renal disease. 2 complex right renal cortical cysts.      Closed nondisplaced fracture of distal phalanx of left great toe with routine healing 10/22/2018    Coronary artery disease 1993    heart transplant    COVID-19 in immunocompromised patient 02/26/2024    Cystitis 05/10/2022    Depression     Encounter for blood transfusion     Encounter for palliative care 10/14/2024    Fibromyalgia     on Lyrica    Heart failure     native heart cardiomyopathy    Heart transplanted 1993    due to cardiomyopathy    History of hyperparathyroidism; Hyperparathyroidism, secondary renal     PT  DENIES    Hypertension     Immune disorder     anti rejection meds    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    Impaired mobility 07/28/2022    Iron deficiency anemia 08/15/2017    Kidney stones     passed per pt    Obesity     Obesity (BMI 30.0-34.9) 07/22/2019    Other osteoporosis without current pathological fracture 08/30/2019    Other pancytopenia 05/06/2024    Parotitis, acute 09/19/2023    Pharyngitis 01/09/2019    Pneumonia due to infectious organism 03/14/2024    PONV (postoperative nausea and vomiting)     Severe sepsis 11/22/2021    Shingles 2003 approx    left leg    Stage 4 chronic kidney disease 11/22/2021    Subclinical hypothyroidism 06/16/2023    Thrombocytopenia, unspecified 11/29/2021    Trouble in sleeping     Unresponsiveness 11/13/2024    Urinary incontinence      Review of patient's allergies indicates:   Allergen Reactions    Dobutamine Other (See Comments)     Severe Left sided chest pain after dosage administered for Dobutamine Stress Test; itching    Lisinopril Swelling and Rash    Hydrocodone-acetaminophen Nausea Only    Augmentin [amoxicillin-pot clavulanate] Diarrhea    Zyvox [linezolid] Nausea And Vomiting         Medications:  Medications Ordered Prior to Encounter[1]      Medications have been reviewed and reconciled with patient at this visit.  Barriers to medications reviewed with patient.      Exam:  Wt Readings from Last 3 Encounters:   02/28/25 67.3 kg (148 lb 5.9 oz)   02/26/25 67 kg (147 lb 13.1 oz)   02/24/25 67.4 kg (148 lb 11.2 oz)     Temp Readings from Last 3 Encounters:   02/28/25 97.7 °F (36.5 °C) (Temporal)   02/26/25 98.2 °F (36.8 °C)   02/26/25 98.2 °F (36.8 °C) (Temporal)     BP Readings from Last 3 Encounters:   02/28/25 (!) 140/70   02/26/25 (!) 155/91   02/26/25 (!) 155/91     Pulse Readings from Last 3 Encounters:   02/28/25 80   02/26/25 91   02/26/25 91     Body mass index is 27.14 kg/m².      Physical Exam  Constitutional:        Appearance: Normal appearance. She is normal weight.   HENT:      Head: Normocephalic.      Right Ear: Tympanic membrane, ear canal and external ear normal.      Left Ear: Tympanic membrane, ear canal and external ear normal.      Nose: Nose normal.      Mouth/Throat:      Mouth: Mucous membranes are moist.      Pharynx: Oropharynx is clear.   Eyes:      General: Lids are normal.      Extraocular Movements: Extraocular movements intact.      Conjunctiva/sclera: Conjunctivae normal.      Pupils: Pupils are equal, round, and reactive to light.      Comments: Glasses on   Cardiovascular:      Rate and Rhythm: Normal rate and regular rhythm.      Pulses: Normal pulses.      Heart sounds: Normal heart sounds.   Pulmonary:      Effort: Pulmonary effort is normal.      Breath sounds: Normal breath sounds.   Abdominal:      General: Bowel sounds are normal.      Palpations: Abdomen is soft.   Musculoskeletal:         General: Normal range of motion.      Cervical back: Normal range of motion and neck supple.      Right lower le+ Edema present.      Left lower le+ Edema present.   Skin:     General: Skin is warm and dry.      Capillary Refill: Capillary refill takes less than 2 seconds.   Neurological:      General: No focal deficit present.      Mental Status: She is alert and oriented to person, place, and time. Mental status is at baseline.   Psychiatric:         Attention and Perception: Attention normal.         Mood and Affect: Mood is depressed. Affect is tearful.         Speech: Speech normal.         Behavior: Behavior normal. Behavior is cooperative.         Thought Content: Thought content normal.         Judgment: Judgment normal.      Comments: Struggles w/ delegation          Laboratory Reviewed:     Lab Results   Component Value Date    WBC 3.62 (L) 2025    WBC 3.62 (L) 2025    HGB 9.1 (L) 2025    HGB 9.1 (L) 2025    HCT 29.2 (L) 2025    HCT 29.2 (L) 2025      "02/24/2025     02/24/2025    CHOL 142 07/20/2024    TRIG 85 07/20/2024    HDL 44 07/20/2024    ALT 28 02/24/2025    AST 42 (H) 02/24/2025     02/24/2025     02/24/2025    K 4.7 02/24/2025    K 4.7 02/24/2025     02/24/2025     02/24/2025    CREATININE 4.1 (H) 02/24/2025    CREATININE 4.1 (H) 02/24/2025    BUN 37 (H) 02/24/2025    BUN 37 (H) 02/24/2025    CO2 21 (L) 02/24/2025    CO2 21 (L) 02/24/2025    TSH 9.288 (H) 02/24/2025    PSA <0.1 05/27/2008    INR 1.0 11/12/2024    HGBA1C 5.1 07/02/2024       Screening or Prevention Patient's value Goal Complete/Controlled?   HgA1C Testing and Control   Lab Results   Component Value Date    HGBA1C 5.1 07/02/2024      Annually/Less than 8% Yes   Lipid profile : 07/20/2024 Annually Yes   LDL control Lab Results   Component Value Date    LDLCALC 81.0 07/20/2024    Annually/Less than 100 mg/dl  Yes   Nephropathy screening No results found for: "LABMICR"  Lab Results   Component Value Date    PROTEINUA 1+ (A) 02/07/2025    Annually Yes   Blood pressure BP Readings from Last 1 Encounters:   02/28/25 (!) 140/70    Less than 140/90 No   Dilated retinal exam Most Recent Eye Exam Date: Not Found Annually Yes   Foot exam   Most Recent Foot Exam Date: Not Found Annually Yes       Health Maintenance  Health Maintenance Topics with due status: Not Due       Topic Last Completion Date    TETANUS VACCINE 09/09/2020    Colorectal Cancer Screening 02/25/2021    Hemoglobin A1c (Diabetic Prevention Screening) 07/02/2024    DEXA Scan 07/03/2024    Lipid Panel 07/20/2024     Health Maintenance Due   Topic Date Due    RSV Vaccine (Age 60+ and Pregnant patients) (1 - Risk 60-74 years 1-dose series) Never done    Mammogram  04/18/2025         1. Positive depression screening  Comments:  I have reviewed the positive depression score which warrants active treatment with psychotherapy and/or medications.  Overview:  I have reviewed the positive depression score which " warrants active treatment with psychotherapy and/or medications.    Assessment & Plan:  Considering restarting Cymbalta and Buspar---pending consult w/ nephrology and MD Delano due to CKD stage 5 pending dialysis.   Continue to see MENDEL Capps here at [facility]         2. Anemia associated with stage 5 chronic renal failure  Assessment & Plan:  Received Retacrit injection on yesterday w/ Heme/oncology  F/u with hematology within 2 and 4 weeks.       3. Essential hypertension  Assessment & Plan:  Now taking nifedipine 60 mg---changed by cardiology        4. Heart transplanted  Overview:  5/93     Assessment & Plan:  Continue to closely monitor  Changes made to HTN medication by Dr. King--external cardiology   Recommend f/u w/ [facility]      5. Other osteoporosis without current pathological fracture  Overview:  FINDINGS: 2023  The L1 to L4 vertebral bone mineral density is equal to 1.06 g/cm squared with a T score of -1.0.  There has been no significant change relative to the prior study.     The left femoral neck bone mineral density is equal to 0.995 g/cm squared with a T score of -0.3.  There has been  no significant change relative to the prior study.     There is a 8.3% risk of a major osteoporotic fracture and a 0.5% risk of hip fracture in the next 10 years (FRAX).     Impression:     Osteopenia      6. Abnormal serum thyroid stimulating hormone (TSH) level  Assessment & Plan:  TSH 9.288 High  9.132 High  10.030   Repeat TSH within 6 months   No treatment at this time  Normal free T4  T3, Total 67 65         7. Ventral hernia without obstruction or gangrene  Overview:  CT abd Jan 2023    Assessment & Plan:  No complaints   Advise to strain while using restroom  Recommend wearing abdomen binder    CT Abdomen 02/24/25  Impression:     Midline ventral hernia with a 4.3 cm neck containing herniated liver        8. Gastroesophageal reflux disease without esophagitis  Overview:  CT Renal  6/11/2018---   Small hiatal hernia.    Assessment & Plan:  Taking pantoprazole, tolerating well but declining in kidney function  Advise to change diet, sit up after eating       9. Pulmonary heart disease  Overview:  Echocardiogram 5/28/24   The estimated pulmonary artery systolic pressure is 53 mmHg.    Left Ventricle: The left ventricle is normal in size. Normal wall thickness. There is mild asymmetric hypertrophy. Normal wall motion. Septal flattening in systole consistent with right ventricular pressure overload. There is normal systolic function with a visually estimated ejection fraction of 55 - 60%. There is diastolic dysfunction but grade cannot be determined.    Right Ventricle: Moderate right ventricular enlargement. Wall thickness is normal. Systolic function is normal.    Left Atrium: Left atrium is severely dilated.    Right Atrium: Right atrium is moderately dilated.    Tricuspid Valve: There is moderate regurgitation.    IVC/SVC: Intermediate venous pressure at 8 mmHg.        10. Orthopedic device, implant, or graft complication  Assessment & Plan:  Recent procedure to remove orthopedic device stimulator  Received injections int left hip by Dr. Loza       11. CKD (chronic kidney disease) stage 5, GFR less than 15 ml/min  Assessment & Plan:  On board to proceed w/ dialysis treatment option.   Pending on getting dialysis shunts to right upper extremity       12. Chronic idiopathic constipation  Assessment & Plan:  Taking Enemas as needed  Appetite changes   Continue Miralax every other day      13. FRANKY (stress urinary incontinence, female)  Overview:  08/10/2023 robotic Viera urethropexy     Assessment & Plan:  Bladder neurostimulator removed---02/18/25 by Dimas  (urology)      14. Primary insomnia  Assessment & Plan:  Ambien not working   Discuss depressed mood.                Worry Score: 3    Care Plan/Goals: Reviewed    Goals    None         Follow up: No follow-ups on file.      The  following issues were discussed: The primary encounter diagnosis was Positive depression screening. Diagnoses of Anemia associated with stage 5 chronic renal failure, Essential hypertension, Heart transplanted, Other osteoporosis without current pathological fracture, Abnormal serum thyroid stimulating hormone (TSH) level, Ventral hernia without obstruction or gangrene, Gastroesophageal reflux disease without esophagitis, Pulmonary heart disease, Orthopedic device, implant, or graft complication, CKD (chronic kidney disease) stage 5, GFR less than 15 ml/min, Chronic idiopathic constipation, FRANKY (stress urinary incontinence, female), and Primary insomnia were also pertinent to this visit.    Health maintenance needs, recent test results and goals of care discussed with patient and questions answered. Patient was given the opportunity to ask questions and be an active participant in their medical care and questions answered.  Patient had no further questions or concerns at this time.     After visit summary printed and given to patient upon discharge.  Patient goals and care plan are included in After visit summary.    TOTAL TIME evaluating and managing this patient for this encounter was  65 minutes. This time was spent personally by me on some of the following activities: review of patient's past medical history, assessing age-appropriate health maintenance needs, review of any interval history, review and interpretation of lab results, review and interpretation of imaging test results, review and interpretation of cardiology test results, reviewing consulting specialist notes, obtaining history from the patient and family, examination of the patient, medication reconciliation, managing and/or ordering prescription medications, ordering imaging tests, ordering referral to subspecialty provider(s), educating patient and answering their questions about diagnosis, treatment plan, and goals of treatment, discussing  planned follow-up and final documentation of the visit. This time was exclusive of any separately billable procedures for this patient and exclusive of time spent treating any other patients.    This note was generated with the assistance of ambient listening technology. Verbal consent was obtained by the patient and accompanying visitor(s) for the recording of patient appointment to facilitate this note. I attest to having reviewed and edited the generated note for accuracy, though some syntax or spelling errors may persist. Please contact the author of this note for any clarification.               ROSSANA Bazan, FNP-C  [facility]  [address] [place] [address]  [place], [place] [address] [phone] ph  [phone] fax    I have reviewed the positive depression score which warrants active treatment with psychotherapy and/or medications. Seeing MENDEL Capps here at [facility] Considering restarting Cymbalta and Buspar for antidepressant and antianxiety, pending consults w/ Nephrology and PCP due to CKD stage 5.          [1]   Current Outpatient Medications on File Prior to Visit   Medication Sig Dispense Refill    aspirin (ECOTRIN) 81 MG EC tablet Take 1 tablet (81 mg total) by mouth once daily.      calcitRIOL (ROCALTROL) 0.25 MCG Cap Take 1 capsule (0.25 mcg total) by mouth once daily. 30 capsule 11    carvediloL (COREG) 3.125 MG tablet Take 1 tablet (3.125 mg total) by mouth 2 (two) times daily with meals. Hold parameters: SBP less than 110 and/or DBP less than 75 180 tablet 3    cycloSPORINE modified, NEORAL, (NEORAL) 25 MG capsule TAKE 3 CAPSULES TWICE DAILY 540 capsule 0    furosemide (LASIX) 40 MG tablet Take 1 tablet (40 mg total) by mouth once daily. 30 tablet 11    LIDOcaine (LIDODERM) 5 % Place 1 patch onto the skin once daily. Remove & Discard patch within 12 hours or as directed by MD 30 patch 2    mupirocin (BACTROBAN) 2 % ointment Apply topically 2 (two) times daily. 22 g 0    NIFEdipine  (PROCARDIA-XL) 60 MG (OSM) 24 hr tablet Take 1 tablet (60 mg total) by mouth once daily. 30 tablet 11    nitroGLYCERIN (NITROSTAT) 0.4 MG SL tablet PLACE 1 TABLET UNDER THE TONGUE EVERY 5 MINS AS NEEDED FOR CHEST PAIN FOR UP TO 3 DOSES.IF CONTINUED HEART PAIN/PRESSURE AFTER 100 tablet 0    ondansetron (ZOFRAN) 4 MG tablet Take 1 tablet (4 mg total) by mouth as needed for Nausea. 16 tablet 0    oxyCODONE-acetaminophen (PERCOCET) 5-325 mg per tablet Take 1 tablet by mouth every 4 (four) hours as needed for Pain. 25 tablet 0    pantoprazole (PROTONIX) 20 MG tablet TAKE 1 TABLET ONE TIME DAILY 90 tablet 3    patiromer calcium sorbitex (VELTASSA) 8.4 gram PwPk Take 1 packet (8.4 g total) by mouth once daily. 90 packet 0    polyethylene glycol (GLYCOLAX) 17 gram PwPk Take 17 g by mouth once daily.      pregabalin (LYRICA) 50 MG capsule Take 1 capsule (50 mg total) by mouth every evening. 90 capsule 0    sodium bicarbonate 650 MG tablet Take 1 tablet (650 mg total) by mouth 2 (two) times daily. 60 tablet 0    tiZANidine (ZANAFLEX) 2 MG tablet Take 2 tablets (4 mg total) by mouth 2 (two) times a day. 120 tablet 5    vitamin E 400 UNIT capsule Take 400 Units by mouth once daily.      temazepam (RESTORIL) 30 mg capsule Take 1 capsule (30 mg total) by mouth nightly as needed for Insomnia. FOR INSOMNIA 30 capsule 5    zolpidem (AMBIEN) 5 MG Tab Take 1 tablet (5 mg total) by mouth every evening. 30 tablet 5     No current facility-administered medications on file prior to visit.

## 2025-02-28 NOTE — ASSESSMENT & PLAN NOTE
TSH 9.288 High  9.132 High  10.030   Repeat TSH within 6 months   No treatment at this time  Normal free T4  T3, Total 67 65

## 2025-02-28 NOTE — ASSESSMENT & PLAN NOTE
Continue to closely monitor  Changes made to HTN medication by Dr. King--external cardiology   Recommend f/u w/ [facility]   •  LISINOPRIL-HYDROCHLOROTHIAZIDE 20-25 MG Oral Tab, TAKE 1 TABLET BY MOUTH DAILY, Disp: 90 tablet, Rfl: 0 HARSHIL DAVIS

## 2025-02-28 NOTE — ASSESSMENT & PLAN NOTE
Considering restarting Cymbalta and Buspar---pending consult w/ nephrology and MD Delano due to CKD stage 5 pending dialysis.   Continue to see MENDEL Capps here at [facility]

## 2025-03-03 DIAGNOSIS — R11.2 NAUSEA AND VOMITING, UNSPECIFIED VOMITING TYPE: ICD-10-CM

## 2025-03-03 RX ORDER — SODIUM BICARBONATE 650 MG/1
650 TABLET ORAL 2 TIMES DAILY
Qty: 60 TABLET | Refills: 1 | Status: SHIPPED | OUTPATIENT
Start: 2025-03-03

## 2025-03-04 ENCOUNTER — OFFICE VISIT (OUTPATIENT)
Dept: PAIN MEDICINE | Facility: CLINIC | Age: 70
End: 2025-03-04
Payer: MEDICARE

## 2025-03-04 VITALS
HEIGHT: 62 IN | DIASTOLIC BLOOD PRESSURE: 89 MMHG | RESPIRATION RATE: 17 BRPM | SYSTOLIC BLOOD PRESSURE: 158 MMHG | WEIGHT: 150.44 LBS | HEART RATE: 86 BPM | BODY MASS INDEX: 27.68 KG/M2

## 2025-03-04 DIAGNOSIS — M47.814 THORACIC SPONDYLOSIS: ICD-10-CM

## 2025-03-04 DIAGNOSIS — M54.14 THORACIC RADICULOPATHY: ICD-10-CM

## 2025-03-04 DIAGNOSIS — Z94.1 HEART TRANSPLANTED: Chronic | ICD-10-CM

## 2025-03-04 DIAGNOSIS — M47.816 LUMBAR SPONDYLOSIS: Primary | ICD-10-CM

## 2025-03-04 PROCEDURE — 3079F DIAST BP 80-89 MM HG: CPT | Mod: HCNC,CPTII,S$GLB, | Performed by: NURSE PRACTITIONER

## 2025-03-04 PROCEDURE — 3066F NEPHROPATHY DOC TX: CPT | Mod: HCNC,CPTII,S$GLB, | Performed by: NURSE PRACTITIONER

## 2025-03-04 PROCEDURE — 1159F MED LIST DOCD IN RCRD: CPT | Mod: HCNC,CPTII,S$GLB, | Performed by: NURSE PRACTITIONER

## 2025-03-04 PROCEDURE — 1125F AMNT PAIN NOTED PAIN PRSNT: CPT | Mod: HCNC,CPTII,S$GLB, | Performed by: NURSE PRACTITIONER

## 2025-03-04 PROCEDURE — 3008F BODY MASS INDEX DOCD: CPT | Mod: HCNC,CPTII,S$GLB, | Performed by: NURSE PRACTITIONER

## 2025-03-04 PROCEDURE — 3077F SYST BP >= 140 MM HG: CPT | Mod: HCNC,CPTII,S$GLB, | Performed by: NURSE PRACTITIONER

## 2025-03-04 PROCEDURE — 99214 OFFICE O/P EST MOD 30 MIN: CPT | Mod: HCNC,S$GLB,, | Performed by: NURSE PRACTITIONER

## 2025-03-04 PROCEDURE — 1101F PT FALLS ASSESS-DOCD LE1/YR: CPT | Mod: HCNC,CPTII,S$GLB, | Performed by: NURSE PRACTITIONER

## 2025-03-04 PROCEDURE — 3288F FALL RISK ASSESSMENT DOCD: CPT | Mod: HCNC,CPTII,S$GLB, | Performed by: NURSE PRACTITIONER

## 2025-03-04 PROCEDURE — 1157F ADVNC CARE PLAN IN RCRD: CPT | Mod: HCNC,CPTII,S$GLB, | Performed by: NURSE PRACTITIONER

## 2025-03-04 PROCEDURE — 99999 PR PBB SHADOW E&M-EST. PATIENT-LVL IV: CPT | Mod: PBBFAC,HCNC,, | Performed by: NURSE PRACTITIONER

## 2025-03-05 ENCOUNTER — DOCUMENTATION ONLY (OUTPATIENT)
Dept: PRIMARY CARE CLINIC | Facility: CLINIC | Age: 70
End: 2025-03-05
Payer: MEDICARE

## 2025-03-05 DIAGNOSIS — M79.7 FIBROMYALGIA: Chronic | ICD-10-CM

## 2025-03-05 DIAGNOSIS — K21.9 GASTROESOPHAGEAL REFLUX DISEASE, UNSPECIFIED WHETHER ESOPHAGITIS PRESENT: ICD-10-CM

## 2025-03-05 DIAGNOSIS — F51.01 PRIMARY INSOMNIA: ICD-10-CM

## 2025-03-05 RX ORDER — METHOCARBAMOL 500 MG/1
500 TABLET, FILM COATED ORAL 3 TIMES DAILY
Qty: 180 TABLET | Refills: 5 | OUTPATIENT
Start: 2025-03-05

## 2025-03-05 NOTE — PROGRESS NOTES
Discussed patient with psych PA, Raegan De León.  Discussed a possible diagnosis of OCPD versus a diagnosis of OCD.  Psych PA suggested that patient be referred to psychiatry for further diagnosis and medication recommendations.   Will discuss with PCP.

## 2025-03-06 DIAGNOSIS — R11.2 NAUSEA AND VOMITING, UNSPECIFIED VOMITING TYPE: ICD-10-CM

## 2025-03-06 RX ORDER — ONDANSETRON 4 MG/1
4 TABLET, FILM COATED ORAL EVERY 12 HOURS PRN
Qty: 16 TABLET | Refills: 0 | Status: SHIPPED | OUTPATIENT
Start: 2025-03-06

## 2025-03-06 RX ORDER — PREGABALIN 50 MG/1
50 CAPSULE ORAL 2 TIMES DAILY
Qty: 180 CAPSULE | Refills: 0 | Status: SHIPPED | OUTPATIENT
Start: 2025-03-06

## 2025-03-06 RX ORDER — TEMAZEPAM 30 MG/1
CAPSULE ORAL
Qty: 30 CAPSULE | Refills: 1 | Status: SHIPPED | OUTPATIENT
Start: 2025-03-06

## 2025-03-06 RX ORDER — CARVEDILOL 3.12 MG/1
3.12 TABLET ORAL 2 TIMES DAILY WITH MEALS
Qty: 180 TABLET | Refills: 0 | Status: SHIPPED | OUTPATIENT
Start: 2025-03-06

## 2025-03-06 RX ORDER — ONDANSETRON 4 MG/1
4 TABLET, FILM COATED ORAL
Qty: 16 TABLET | Refills: 0 | Status: SHIPPED | OUTPATIENT
Start: 2025-03-06 | End: 2025-03-06

## 2025-03-06 RX ORDER — PANTOPRAZOLE SODIUM 20 MG/1
20 TABLET, DELAYED RELEASE ORAL
Qty: 90 TABLET | Refills: 1 | Status: SHIPPED | OUTPATIENT
Start: 2025-03-06

## 2025-03-06 RX ORDER — ONDANSETRON 4 MG/1
4 TABLET, FILM COATED ORAL EVERY 12 HOURS PRN
Qty: 16 TABLET | Refills: 0 | Status: SHIPPED | OUTPATIENT
Start: 2025-03-06 | End: 2025-03-06

## 2025-03-06 NOTE — TELEPHONE ENCOUNTER
Returned call to patient and scheduled an appointment with Corin Lincoln for 3/7/25 t 9:40 am at [facility]

## 2025-03-06 NOTE — TELEPHONE ENCOUNTER
----- Message from Sofy sent at 3/6/2025  9:07 AM CST -----  Contact: Nadia  Type:  Needs Medical AdviceWho Called: Asuncion (please be specific): Severe pain radiating from buttocks to knee/painful to walkHow long has patient had these symptoms:  1 dayPharmacy name and phone #: Would the patient rather a call back or a response via [facility]? Raise Call Back Number: [phone] Additional Information: Patient reports experiencing severe pain radiating from buttocks to knee and is painful to walk and request to receive medical advice and discuss procedure currently scheduled. Please give patient an immediate call back to assist. Thank you,[facility]   Alternatives Discussed Intro (Do Not Add Period): I discussed alternative treatments to Mohs surgery and specifically discussed the risks and benefits of

## 2025-03-06 NOTE — PROGRESS NOTES
Interventional Pain Progress Note     Referring Physician: No ref. provider found    PCP: Elis Wick MD    Chief Complaint: Low Back Pain      Interval History (3/7/2025):  Patient Nadia Damon presents today for follow-up visit.  Patient states since she was seen she fell off her cough onto her side and buttocks. She rates her pain 10/10 and states it is in the lower back and radiating to the hips. At times she feels burning down the sides of the thighs. Pain is worse to extend back and stand up straight and walking.   Patient denies night fever/night sweats, urinary incontinence, bowel incontinence, significant weight loss and significant motor weakness.   Patient denies any other complaints or concerns at this time.      Interval History (3/4/2025):  Patient Nadia Damon presents today for follow-up visit.  Patient was last seen on 2/6/2025 for her 1st left L4/5 + L5/S1 lumbar MBB which provided 90% relief.she would like to move forward with the 2nd MBB.  She also had a fall recently and had a CT lumbar to evaluate for any fractures. No acute findings or fracture noted.  Pain continues to be 9/10 and primarily axial in the low back without radiculopathy.   Worse with prolonged standing and walking.   Patient denies night fever/night sweats, urinary incontinence, bowel incontinence, significant weight loss and significant motor weakness.   Patient denies any other complaints or concerns at this time.      Interval History (1/8/2025):  Patient Nadia Damon presents today for follow-up visit.  Patient was last seen on 12/5/2024 Right L4/5 + L5/S1 RFA 50% relief. She is doing better ambulating without her walker and cane. Pain not going into the legs currently. She rates her pain today 7/10. She continues to have left sided pain and inquires about doing the RFA on the left. Pain is worse with prolonged standing and does not radiate. Pain is primarily axial.  She has finished rehab/PT following  her stroke.  Patient denies night fever/night sweats, urinary incontinence, bowel incontinence, significant weight loss and significant motor weakness.   Patient denies any other complaints or concerns at this time.    Interval History (11/20/2024):  Patient Nadia Damon presents today for follow-up visit.  Patient was last seen on9/16/2024 T11/12 IL HELLEN with 85% relief. She had a stroke in July and saw neurosurgery following the stroke and was not interested in any surgical intervention. Overall she went through rehab and is improving in mobility and function. She continues to have axial low back pain with the right side worse than the left. No radiculopathy. Pain worse with standing and prolonged walking. Relief with leaning forward. She rates her pain today 7/10.  Patient denies night fever/night sweats, urinary incontinence, bowel incontinence, significant weight loss and significant motor weakness.   Patient denies any other complaints or concerns at this time.      Interval History (3/3/2023):  Patient returns to clinic after procedure.  Patient reports 100% relief and thoracic pain after T11-T12 interlaminar epidural steroid injection on 02/02/2023.  Patient reports occasional throbbing pain across her right lower back with no radiation to her buttocks or lower extremities.  Pain is worse with lifting and prolonged standing, better with heat and topicals.  Pain is rated as a year out 10 currently. Denies any fevers, chills, changes in gait, weakness, or bowel and bladder incontinence        Interval History (1/13/22):  Patient returns to clinic for evaluation of thoracic pain.  Patient reports approximately 2 weeks ago she had sudden onset of lower thoracic pain.  She denies any significant inciting factors to his pain.  Pain starts as a sharp stabbing pain in her lower midline thoracic area and then radiates to her right side to her anterior chest wall.  Pain is worse with standing and walking, better  with lying supine.  Pain is rated an 8/10.  Patient reports that this pain is significantly different from her previous lumbar pain. Denies any fevers, chills,  or bowel and bladder incontinence        Interval History (9/30/22):  Patient returns to clinic after procedure.  Patient reports complete resolution of right lower extremity pain after right sacroiliac joint injection and right L5-S1 facet injection on 08/22/2022.  Primary pain today is tight aching pain in right side of lower back.  Pain is worse with extension and rotation, better with rest and heat.  Patient does report an episode of physical therapy on 09/21/2022 where she experienced increased muscle aches and weakness.  She reports that since this time her symptoms have greatly improved.  She denies any significant weakness today.  Pain is rated a 7/10. Denies any fevers, chills, changes in gait, weakness, or bowel and bladder incontinence        SUBJECTIVE:    Nadia Damon is a 69 y.o. female who presents to the clinic for the evaluation of lower back pain. The pain started 1 year ago and symptoms have been worsening.The pain is located in the right lower back and right buttocks area with radiation to the posterior lateral aspect of her right thigh and occasionally her right calf.  The pain is described as aching, shooting and stabbing and is rated as 5/10.   The pain is rated with a score of  2/10 on the BEST day and a score of 9/10 on the WORST day.  Symptoms interfere with daily activity and work. The pain is exacerbated by Sitting, Standing, Extension and Flexing.  The pain is mitigated by physical therapy and rest.     Patient denies night fever/night sweats, urinary incontinence, bowel incontinence, significant weight loss, significant motor weakness and loss of sensations.      Non-Pharmacologic Treatments:  Physical Therapy/Home Exercise: yes  Ice/Heat:yes  TENS: no  Acupuncture: no  Massage: no  Chiropractic: no        Previous Pain  Medications:  Tylenol, topicals, neuropathic,      report:  Reviewed and consistent with medication use as prescribed.    Pain Procedures:   2023:  T11-12 interlaminar epidural steroid injection, 100% relief  22:  Right L4-5 and L5-S1 radiofrequency ablation, 75% relief  22:  Right sacroiliac joint and right L5-S1 facet injection,   Resolution of right lower extremity pain  2024 T11/12 IL HELLEN with 85% relief    Imagin2025 CT lumbar  FINDINGS:  Minor grade 1 2 mm L4-5 spondylolisthesis is present.     Advanced T11-12 disc degeneration is present with high-grade disc narrowing and endplate sclerosis.  T10-11 and T11-12 vacuum disc phenomenon are evident.     T11-12: Severe disc degeneration as detailed above.  Mild to moderate right foraminal stenosis.     T12-L1: Mild disc bulge.     L1-2: Unremarkable.     L2-3: Unremarkable.     L3-4: Mild disc bulge with mild facet arthrosis.  Interspinous process bursitis.     L4-5: Mild degenerative disc narrowing with disc bulge.  Moderate to severe facet arthrosis with 2 mm spondylolisthesis.     L5-S1: Minor disc bulge.  Sacralized transverse processes with right pseudoarticulation.  Mild left foraminal stenosis.     Low-grade SI joint arthrosis.     Impression:     No acute abnormality.  Advanced T11-12 disc degeneration.     Mild degenerative changes L3-4, L4-5 and L5-S1 as detailed above.    2024 CT lumbar  FINDINGS:  No vertebral body compression deformity.  Normal bone mineral density.  Normal alignment of the vertebral bodies.  No soft tissue abnormality.  Mild moderate multilevel degenerative disc disease.  Atherosclerotic changes.     Impression:     No acute fracture or dislocation.  Mild moderate multilevel degenerative disc disease.  See recent myelogram for additional insights.    CT THORACIC SPINE WITHOUT CONTRAST 23     CLINICAL HISTORY:  Myelopathy, acute, thoracic spine;  Radiculopathy, thoracic region      TECHNIQUE:  Helical axial images are acquired through the thoracic spine without IV contrast.  Images were reformatted in the coronal sagittal plane.     COMPARISON:  None     FINDINGS:  Paraspinal soft tissues appear within normal limits.  Partially visualized lung fields demonstrate band like opacity at the lingula.  Favor scarring/atelectasis.  Partially visualized abdominal contents are within normal limits.     Alignment is within normal limits.  There is no anterolisthesis or retrolisthesis.  Vertebral body heights are relatively well preserved.  There appears to be a chronic fracture deformity along the posterior spinous process of C7.  Borders of the bone fragment are well corticated.  No evidence of acute injury.  Partially visualized ribs appear intact.     Evaluation for central canal narrowing is limited without intrathecal contrast.     Multilevel degenerative disc changes are present, worst at the mid to lower thoracic spine.  Degenerative disc changes are worst at T11-T12.  There is a 5 mm disc bulge/herniation at T11-T12.  Disc bulge/herniation is slightly high density with minimal peripheral calcification.     No bony neural foraminal narrowing from C7 to T11.  There is moderate to severe right and mild-to-moderate left neural foraminal narrowing.     Impression:     1. Degenerative disc changes, worst at T11-T12.  Evaluation for central canal narrowing is limited without intrathecal contrast.  2. Bony neural foraminal narrowing is worst at the right T11-T12 foramen.         Results for orders placed during the hospital encounter of 07/13/22    X-Ray Lumbar Spine 5 View    Narrative  EXAMINATION:  XR LUMBAR SPINE COMPLETE 5 VIEW    CLINICAL HISTORY:  Sacrococcygeal disorders, not elsewhere classified    TECHNIQUE:  AP, lateral, spot and bilateral oblique views of the lumbar spine were performed.    COMPARISON:  Lumbar spine radiographs May 17, 2018    FINDINGS:  Minimal grade 1 anterolisthesis of  L4 on L5.  No fracture or pars defect.  Unchanged mild disc height loss at the L4-L5 level.  Moderate to severe degenerative disc disease at the T11-T12 level.  Prominent inferior lumbar spine facet arthropathy.  Sacroiliac joints appear normal.  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  No osseous erosion or suspicious osseous lesion.    Impression  As above.      Electronically signed by: Isac Bell  Date:    07/13/2022  Time:    15:39          Past Medical History:   Diagnosis Date    Abdominal wall hernia     CT Renal 6/11/2018---Small fat containing superior ventral abdominal wall hernia at the epicardial pacing lead site.    Abnormal mammogram 10/12/2021    Acute cystitis without hematuria 05/10/2022    Acute ischemic right middle cerebral artery (MCA) stroke 07/20/2024    Adverse drug reaction 11/12/2024    GRACE (acute kidney injury) 11/22/2021    Anxiety     Aphasia 07/19/2024    Arthritis     ZEN HIPS    Breast cancer in female 08/2021    LEFT BREAST    Breast mass, right 11/13/2024    RIGHT BREAST MASS (Problem)      Bronchitis 08/18/2016    Never smoked      C. difficile colitis 11/29/2021    Cellulitis of axilla, left 12/23/2021    Chronic diastolic heart failure 12/16/2021    Chronic kidney disease     stage 4, GFR 15-29 ml/min    Chronic midline low back pain without sciatica 06/18/2018    CKD (chronic kidney disease) stage 4, GFR 15-29 ml/min 04/20/2016    US Retro   5/16/2018---Mild cortical thinning and increased cortical echogenicity.  Findings can be seen with medical renal disease.  8/31/216--- Echogenic kidneys with diffuse cortical thinning suggesting  medical renal disease. 2 complex right renal cortical cysts.      Closed nondisplaced fracture of distal phalanx of left great toe with routine healing 10/22/2018    Coronary artery disease 1993    heart transplant    COVID-19 in immunocompromised patient 02/26/2024    Cystitis  05/10/2022    Depression     Encounter for blood transfusion     Encounter for palliative care 10/14/2024    Fibromyalgia     on Lyrica    Heart failure     native heart cardiomyopathy    Heart transplanted 1993    due to cardiomyopathy    History of hyperparathyroidism; Hyperparathyroidism, secondary renal     PT DENIES    Hypertension     Immune disorder     anti rejection meds    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    Impaired mobility 07/28/2022    Iron deficiency anemia 08/15/2017    Kidney stones     passed per pt    Obesity     Obesity (BMI 30.0-34.9) 07/22/2019    Other osteoporosis without current pathological fracture 08/30/2019    Other pancytopenia 05/06/2024    Parotitis, acute 09/19/2023    Pharyngitis 01/09/2019    Pneumonia due to infectious organism 03/14/2024    PONV (postoperative nausea and vomiting)     Severe sepsis 11/22/2021    Shingles 2003 approx    left leg    Stage 4 chronic kidney disease 11/22/2021    Subclinical hypothyroidism 06/16/2023    Thrombocytopenia, unspecified 11/29/2021    Trouble in sleeping     Unresponsiveness 11/13/2024    Urinary incontinence      Past Surgical History:   Procedure Laterality Date    bladder implant Right 06/11/2024    BLADDER SURGERY  2015 approx    mesh - Dr Everett then 2nd reconstructive sx Dr Onofre    BREAST BIOPSY Bilateral     NEGATIVE    BREAST BIOPSY Right 10/31/2022    benign    BREAST LUMPECTOMY Left 2021    BREAST SURGERY Left 09/28/2015    Bx - benign    BREAST SURGERY Right 12/2015    Bx benign    CARDIAC PACEMAKER REMOVAL Left 06/26/2014    Pacer defirillator removed. Put in 1993 aat time of heart transplant    CARPAL TUNNEL RELEASE Left 03/03/2015    Dr. Hall    COLONOSCOPY N/A 02/25/2021    Procedure: COLONOSCOPY;  Surgeon: Freida Ramirez MD;  Location: [facility];  Service: Endoscopy;  Laterality: N/A;    CYSTOCELE REPAIR      Twice with mesh removal    EPIDURAL STEROID INJECTION INTO CERVICAL SPINE N/A  02/02/2023    Procedure: T11/T12 IL HELLEN;  Surgeon: Jassi Pierre MD;  Location: [facility];  Service: Pain Management;  Laterality: N/A;    EPIDURAL STEROID INJECTION INTO CERVICAL SPINE N/A 9/16/2024    Procedure: T11/T12 IL HELLEN;  Surgeon: Jassi Pierre MD;  Location: [facility];  Service: Pain Management;  Laterality: N/A;    HEART TRANSPLANT  1993    HERNIA REPAIR Right 1971 approx    Inguinal    HYSTERECTOMY  1983    vag hyst /LSO     IMPLANTATION OF PERMANENT SACRAL NERVE STIMULATOR N/A 06/11/2024    Procedure: INSERTION, NEUROSTIMULATOR, PERMANENT, SACRAL;  Surgeon: Sami Cochran MD;  Location: [facility] OR;  Service: Urology;  Laterality: N/A;    INCISION AND DRAINAGE OF ABSCESS Left 12/24/2021    Procedure: INCISION AND DRAINAGE, ABSCESS;  Surgeon: Joseph Longo MD;  Location: [facility] OR;  Service: General;  Laterality: Left;    INJECTION OF ANESTHETIC AGENT AROUND MEDIAL BRANCH NERVES INNERVATING LUMBAR FACET JOINT Right 10/19/2022    Procedure: Right L4/L5 and L5/S1 MBB;  Surgeon: Jassi Pierre MD;  [facility];  Service: Pain Management;  Laterality: Right;    INJECTION OF ANESTHETIC AGENT AROUND MEDIAL BRANCH NERVES INNERVATING LUMBAR FACET JOINT Right 11/09/2022    Procedure: Right L4/L5 and L5/S1 MBB;  Surgeon: Jassi Pierre MD;  [facility];  Service: Pain Management;  Laterality: Right;    INJECTION OF ANESTHETIC AGENT AROUND MEDIAL BRANCH NERVES INNERVATING LUMBAR FACET JOINT Left 2/6/2025    Procedure: Left L4-5 and L5-S1 MBB #1 with local;  Surgeon: Jassi Pierre MD;  Location: [facility];  Service: Pain Management;  Laterality: Left;    INJECTION OF ANESTHETIC AGENT INTO SACROILIAC JOINT Right 08/22/2022    Procedure: Right SIJ Injection Right L5/S1 Facte Injection;  Surgeon: Jassi Pierre MD;  Location: [facility];  Service: Pain Management;  Laterality: Right;    INSERTION OF TUNNELED CENTRAL VENOUS CATHETER (CVC) WITH  SUBCUTANEOUS PORT N/A 11/09/2021    Procedure: [id number];  Surgeon: Christoph Douglas MD;  Location: [facility] OR;  Service: General;  Laterality: N/A;    RADIOFREQUENCY ABLATION Right 12/5/2024    Procedure: Right L4-5 and L5-S1 RFA;  Surgeon: Jassi Pierre MD;  Location: [facility];  Service: Pain Management;  Laterality: Right;    RADIOFREQUENCY THERMOCOAGULATION Right 12/07/2022    Procedure: Right L4/L5 and L5/S1 Lumbar RFA;  Surgeon: Jassi Pierre MD;  Location: [facility];  Service: Pain Management;  Laterality: Right;    REMOVAL OF ELECTRODE LEAD OF SACRAL NERVE STIMULATOR N/A 2/18/2025    Procedure: REMOVAL, ELECTRODE LEAD, SACRAL NERVE STIMULATOR;  Surgeon: Sami Cochran MD;  Location: [facility] OR;  Service: Urology;  Laterality: N/A;    REMOVAL OF VASCULAR ACCESS PORT      ROBOT-ASSISTED LAPAROSCOPIC ABDOMINAL SACROCOLPOPEXY N/A 08/10/2023    Procedure: ROBOTIC SACROCOLPOPEXY, ABDOMEN;  Surgeon: PRANAY Villalobos MD;  Location: [facility] OR;  Service: OB/GYN;  Laterality: N/A;    ROBOT-ASSISTED LAPAROSCOPIC OOPHORECTOMY Right 08/10/2023    Procedure: ROBOTIC OOPHORECTOMY;  Surgeon: PRANAY Villaloobs MD;  Location: [facility] OR;  Service: OB/GYN;  Laterality: Right;    SENTINEL LYMPH NODE BIOPSY Left 10/12/2021    Procedure: BIOPSY, LYMPH NODE, SENTINEL;  Surgeon: Christoph Douglas MD;  Location: [facility] OR;  Service: General;  Laterality: Left;    TOE SURGERY      XI ROBOTIC URETHROPEXY N/A 08/10/2023    Procedure: XI ROBOTIC URETHROPEXY;  Surgeon: PRANAY Villalobos MD;  Location: [facility] OR;  Service: OB/GYN;  Laterality: N/A;     Social History     Socioeconomic History    Marital status: Single    Number of children: 2    Highest education level: 11th grade   Occupational History    Occupation: Retired   Tobacco Use    Smoking status: Never     Passive exposure: Never    Smokeless tobacco: Never   Substance and Sexual Activity    Alcohol use: Never     Alcohol/week: 0.0 standard drinks of  alcohol    Drug use: No    Sexual activity: Not Currently     Partners: Male     Birth control/protection: See Surgical Hx   Other Topics Concern    Are you pregnant or think you may be? No    Breast-feeding No   Social History Narrative    Single. 2 children , 1  at 30 yoa   strep throat -  pneumonia and renal complications after not completing course of AB. Other child lives in [place], [place]. Has a cousin locally that could help in an emergency. 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 Caffeine intake =- 1 cola a day. No coffee, + occasional tea, avoids caffeine especially at night. Still drives. She does not have a Living Will or Advanced directive.      Social Drivers of Health     Financial Resource Strain: Low Risk  (2025)    Overall Financial Resource Strain (CARDIA)     Difficulty of Paying Living Expenses: Not hard at all   Food Insecurity: No Food Insecurity (2025)    Hunger Vital Sign     Worried About Running Out of Food in the Last Year: Never true     Ran Out of Food in the Last Year: Never true   Recent Concern: Food Insecurity - Food Insecurity Present (2024)    Hunger Vital Sign     Worried About Running Out of Food in the Last Year: Never true     Ran Out of Food in the Last Year: Sometimes true   Transportation Needs: No Transportation Needs (2025)    PRAPARE - Transportation     Lack of Transportation (Medical): No     Lack of Transportation (Non-Medical): No   Physical Activity: Inactive (2025)    Exercise Vital Sign     Days of Exercise per Week: 0 days     Minutes of Exercise per Session: 0 min   Stress: Stress Concern Present (2025)    [employer] - Occupational Stress Questionnaire     Feeling of Stress : To some extent   Housing Stability: Unknown (2025)    Housing Stability Vital Sign     Unable to Pay for Housing in the Last Year: No     Homeless in the Last Year: No     Family History    Problem Relation Name Age of Onset    Cancer Mother  38        breast    Breast cancer Mother      Breast cancer Maternal Grandmother      Heart disease Maternal Grandmother      Hypertension Son      Cataracts Cousin      Diabetes Neg Hx      Stroke Neg Hx      Kidney disease Neg Hx      Asthma Neg Hx      COPD Neg Hx      Melanoma Neg Hx      Hyperlipidemia Neg Hx         Review of patient's allergies indicates:   Allergen Reactions    Dobutamine Other (See Comments)     Severe Left sided chest pain after dosage administered for Dobutamine Stress Test; itching    Lisinopril Swelling and Rash    Hydrocodone-acetaminophen Nausea Only    Augmentin [amoxicillin-pot clavulanate] Diarrhea    Zyvox [linezolid] Nausea And Vomiting       Current Outpatient Medications   Medication Sig    aspirin (ECOTRIN) 81 MG EC tablet Take 1 tablet (81 mg total) by mouth once daily.    calcitRIOL (ROCALTROL) 0.25 MCG Cap Take 1 capsule (0.25 mcg total) by mouth once daily.    carvediloL (COREG) 3.125 MG tablet Take 1 tablet (3.125 mg total) by mouth 2 (two) times daily with meals.    cycloSPORINE modified, NEORAL, (NEORAL) 25 MG capsule TAKE 3 CAPSULES TWICE DAILY    furosemide (LASIX) 40 MG tablet Take 1 tablet (40 mg total) by mouth once daily.    LIDOcaine (LIDODERM) 5 % Place 1 patch onto the skin once daily. Remove & Discard patch within 12 hours or as directed by MD    mupirocin (BACTROBAN) 2 % ointment Apply topically 2 (two) times daily.    NIFEdipine (PROCARDIA-XL) 60 MG (OSM) 24 hr tablet Take 1 tablet (60 mg total) by mouth once daily.    nitroGLYCERIN (NITROSTAT) 0.4 MG SL tablet PLACE 1 TABLET UNDER THE TONGUE EVERY 5 MINS AS NEEDED FOR CHEST PAIN FOR UP TO 3 DOSES.IF CONTINUED HEART PAIN/PRESSURE AFTER    ondansetron (ZOFRAN) 4 MG tablet Take 1 tablet (4 mg total) by mouth every 12 (twelve) hours as needed for Nausea.    oxyCODONE-acetaminophen (PERCOCET) 5-325 mg per tablet Take 1 tablet by mouth every 4 (four) hours as  "needed for Pain.    pantoprazole (PROTONIX) 20 MG tablet TAKE 1 TABLET EVERY DAY    patiromer calcium sorbitex (VELTASSA) 8.4 gram PwPk Take 1 packet (8.4 g total) by mouth once daily.    polyethylene glycol (GLYCOLAX) 17 gram PwPk Take 17 g by mouth once daily.    pregabalin (LYRICA) 50 MG capsule TAKE 1 CAPSULE TWICE DAILY    sodium bicarbonate 650 MG tablet Take 1 tablet (650 mg total) by mouth 2 (two) times daily.    temazepam (RESTORIL) 30 mg capsule TAKE 1 CAPSULE EVERY NIGHT AS NEEDED FOR INSOMNIA    tiZANidine (ZANAFLEX) 2 MG tablet Take 2 tablets (4 mg total) by mouth 2 (two) times a day.    vitamin E 400 UNIT capsule Take 400 Units by mouth once daily.    zolpidem (AMBIEN) 5 MG Tab Take 1 tablet (5 mg total) by mouth every evening.     Current Facility-Administered Medications   Medication    methylPREDNISolone acetate injection 40 mg         ROS  Review of Systems   Constitutional:  Negative for chills, diaphoresis, fatigue and fever.   Respiratory:  Negative for chest tightness, shortness of breath, wheezing and stridor.    Cardiovascular:  Positive for leg swelling. Negative for chest pain.   Gastrointestinal:  Negative for blood in stool, diarrhea, nausea and vomiting.   Endocrine: Negative for cold intolerance and heat intolerance.   Genitourinary:  Negative for dysuria, hematuria and urgency.   Musculoskeletal:  Positive for arthralgias and back pain. Negative for gait problem, joint swelling, myalgias, neck pain and neck stiffness.   Skin:  Negative for rash.   Neurological:  Negative for tremors, seizures, speech difficulty, weakness, light-headedness, numbness and headaches.   Hematological:  Does not bruise/bleed easily.   Psychiatric/Behavioral:  Negative for agitation, confusion and suicidal ideas. The patient is not nervous/anxious.             OBJECTIVE:  /66   Pulse 75   Ht 5' 2" (1.575 m)   Wt 68.2 kg (150 lb 5.7 oz)   LMP 06/20/1983 (Within Years)   BMI 27.50 kg/m² "         Physical Exam  Constitutional:       General: She is not in acute distress.     Appearance: Normal appearance. She is not ill-appearing.   HENT:      Head: Normocephalic and atraumatic.      Nose: No congestion or rhinorrhea.      Mouth/Throat:      Mouth: Mucous membranes are moist.   Eyes:      Extraocular Movements: Extraocular movements intact.      Pupils: Pupils are equal, round, and reactive to light.   Cardiovascular:      Pulses: Normal pulses.   Pulmonary:      Effort: Pulmonary effort is normal.   Abdominal:      General: Abdomen is flat.      Palpations: Abdomen is soft.   Musculoskeletal:      Cervical back: Normal range of motion and neck supple.   Skin:     General: Skin is warm and dry.      Capillary Refill: Capillary refill takes less than 2 seconds.   Neurological:      General: No focal deficit present.      Mental Status: She is alert and oriented to person, place, and time.      Cranial Nerves: No cranial nerve deficit.      Sensory: No sensory deficit.      Motor: No weakness or abnormal muscle tone.      Gait: Gait abnormal (slight right foot drop).      Deep Tendon Reflexes: Babinski sign absent on the right side. Babinski sign absent on the left side.      Reflex Scores:       Patellar reflexes are 2+ on the right side and 2+ on the left side.       Achilles reflexes are 2+ on the right side and 2+ on the left side.  Psychiatric:         Mood and Affect: Mood normal.         Behavior: Behavior normal.         Thought Content: Thought content normal.           Musculoskeletal:      Lumbar Exam---- more limited exam today due to increased pain  Incision: no  Pain with Flexion: yes  Pain with Extension: yes > than flexion; cannot stand straight due to increased pain  ROM:  limited due to pain  Paraspinous TTP:  bilateral lumbar paraspinous  Facet TTP:  L5-S1  Facet Loading:  Positive bilaterally  SLR:  Negative bilaterally  SIJ TTP:  positive bilaterally  ALBERTO:  positive bilaterally  with compression and distraction    Bilateral hips:  Some pain with ROM  GTB tender to palpation  Limited exam due to pain      LABS:  Lab Results   Component Value Date    WBC 3.62 (L) 02/24/2025    WBC 3.62 (L) 02/24/2025    HGB 9.1 (L) 02/24/2025    HGB 9.1 (L) 02/24/2025    HCT 29.2 (L) 02/24/2025    HCT 29.2 (L) 02/24/2025    MCV 92 02/24/2025    MCV 92 02/24/2025     02/24/2025     02/24/2025       CMP  Sodium   Date Value Ref Range Status   02/24/2025 142 136 - 145 mmol/L Final   02/24/2025 142 136 - 145 mmol/L Final     Potassium   Date Value Ref Range Status   02/24/2025 4.7 3.5 - 5.1 mmol/L Final   02/24/2025 4.7 3.5 - 5.1 mmol/L Final     Chloride   Date Value Ref Range Status   02/24/2025 105 95 - 110 mmol/L Final   02/24/2025 105 95 - 110 mmol/L Final     CO2   Date Value Ref Range Status   02/24/2025 21 (L) 23 - 29 mmol/L Final   02/24/2025 21 (L) 23 - 29 mmol/L Final     Glucose   Date Value Ref Range Status   02/24/2025 76 70 - 110 mg/dL Final   02/24/2025 76 70 - 110 mg/dL Final     BUN   Date Value Ref Range Status   02/24/2025 37 (H) 8 - 23 mg/dL Final   02/24/2025 37 (H) 8 - 23 mg/dL Final     Creatinine   Date Value Ref Range Status   02/24/2025 4.1 (H) 0.5 - 1.4 mg/dL Final   02/24/2025 4.1 (H) 0.5 - 1.4 mg/dL Final     Calcium   Date Value Ref Range Status   02/24/2025 9.7 8.7 - 10.5 mg/dL Final   02/24/2025 9.7 8.7 - 10.5 mg/dL Final     Total Protein   Date Value Ref Range Status   02/24/2025 7.9 6.0 - 8.4 g/dL Final     Albumin   Date Value Ref Range Status   02/24/2025 3.3 (L) 3.5 - 5.2 g/dL Final   02/24/2025 3.3 (L) 3.5 - 5.2 g/dL Final     Total Bilirubin   Date Value Ref Range Status   02/24/2025 0.4 0.1 - 1.0 mg/dL Final     Comment:     For infants and newborns, interpretation of results should be based  on gestational age, weight and in agreement with clinical  observations.    Premature Infant recommended reference ranges:  Up to 24 hours.............<8.0 mg/dL  Up to  48 hours............<12.0 mg/dL  3-5 days..................<15.0 mg/dL  6-29 days.................<15.0 mg/dL       Alkaline Phosphatase   Date Value Ref Range Status   02/24/2025 120 40 - 150 U/L Final     AST   Date Value Ref Range Status   02/24/2025 42 (H) 10 - 40 U/L Final     ALT   Date Value Ref Range Status   02/24/2025 28 10 - 44 U/L Final     Anion Gap   Date Value Ref Range Status   02/24/2025 16 8 - 16 mmol/L Final   02/24/2025 16 8 - 16 mmol/L Final     eGFR if    Date Value Ref Range Status   07/14/2022 19 (A) >60 mL/min/1.73 m^2 Final     eGFR if non    Date Value Ref Range Status   07/14/2022 17 (A) >60 mL/min/1.73 m^2 Final     Comment:     Calculation used to obtain the estimated glomerular filtration  rate (eGFR) is the CKD-EPI equation.          Lab Results   Component Value Date    HGBA1C 5.1 07/02/2024             ASSESSMENT:       69 y.o. year old female with lower back pain, consistent with     1. Bilateral hip pain  X-Ray Hips Bilateral 2 View Incl AP Pelvis    methylPREDNISolone acetate injection 40 mg      2. Lumbar radiculopathy  X-Ray Lumbar Complete Including Flex And Ext    methylPREDNISolone acetate injection 40 mg      3. Fall, initial encounter                    Bilateral hip pain  -     X-Ray Hips Bilateral 2 View Incl AP Pelvis; Future; Expected date: 03/07/2025  -     methylPREDNISolone acetate injection 40 mg    Lumbar radiculopathy  -     X-Ray Lumbar Complete Including Flex And Ext; Future; Expected date: 03/07/2025  -     methylPREDNISolone acetate injection 40 mg    Fall, initial encounter                       PLAN:   - Interventions: go now for xray lumbar spine and bilateral hips due to increased pain    Currently scheduled for left L3-5 MBB #2. We will call the day after the procedure.  If patient again reports at least 80% relief of pain, we will then move forward with the RFA.   Explained the risks and benefits of the procedure in  detail with the patient today in clinic along with alternative treatment options, and the patient elected to pursue the intervention.        02/02/2023:  T11-12 interlaminar epidural steroid injection, 100% relief  -12/7/22:  Right L4-5 and L5-S1 radiofrequency ablation, 75% relief  -8/22/22:  Right sacroiliac joint and right L5-S1 facet injection,   Resolution of right lower extremity pain  - Anticoagulation use:   Aspirin 81mg does not need to hold for lumbar MBB    - Medications:    - Procedure note: An IM injection of (methylPREDNISolone acetate 40 mg) was administered during clinic visit by our medical assistant.  This was well tolerated.     Continue Tylenol, and Lyrica as prescribed by primary care  Taken off cymbalta by PCP  Started by back on tizanidine by Dr. Wick   Per Uptodate dosing does not need to be adjusted for renal impairment    - Therapy:    Continue formal physical therapy    - Imaging:   CT of thoracic spine reviewed.  Significant for right eccentric disc bulge at T11-12 with foraminal stenosis   X-ray Lumbar  reviewed. Significant for grade 1 anterolisthesis of L4 upon L5.  Degenerative disc disease at T11-T12.  As well as pseudo joint formation of right L5 transverse process with superior sacrum     - Consults:   Continue follow-up with cardiology for previous heart transplant    - Counseled patient regarding the importance of activity modification and physical therapy    - Patient Questions: Answered all of the patient's questions regarding diagnosis, therapy, and treatment    - Follow up visit:  call day after procedure      The above plan and management options were discussed at length with patient. Patient is in agreement with the above and verbalized understanding.    I discussed the goals of interventional chronic pain management with the patient on today's visit.  I explained the utility of injections for diagnostic and therapeutic purposes.  We discussed a multimodal approach to  pain including treating the patient's given worst pain at any given time.  We will use a systematic approach to addressing pain.  We will also adopt a multimodal approach that includes injections, adjuvant medications, physical therapy, at times psychiatry.  There may be a limited role for opioid use intermittently in the treatment of pain, more particularly for acute pain although no one approach can be used as a sole treatment modality.    I emphasized the importance of regular exercise, core strengthening and stretching, diet and weight loss as a cornerstone of long-term pain management.      Corin Lincoln NP  Interventional Pain Management  [facility]    Disclaimer:  This note was prepared using voice recognition system and is likely to have sound alike errors that may have been overlooked even after proof reading.  Please call me with any questions

## 2025-03-07 ENCOUNTER — RESULTS FOLLOW-UP (OUTPATIENT)
Dept: PAIN MEDICINE | Facility: CLINIC | Age: 70
End: 2025-03-07

## 2025-03-07 ENCOUNTER — HOSPITAL ENCOUNTER (OUTPATIENT)
Dept: RADIOLOGY | Facility: HOSPITAL | Age: 70
Discharge: HOME | End: 2025-03-07
Attending: NURSE PRACTITIONER
Payer: MEDICARE

## 2025-03-07 ENCOUNTER — OFFICE VISIT (OUTPATIENT)
Dept: PAIN MEDICINE | Facility: CLINIC | Age: 70
End: 2025-03-07
Payer: MEDICARE

## 2025-03-07 VITALS
DIASTOLIC BLOOD PRESSURE: 66 MMHG | WEIGHT: 150.38 LBS | BODY MASS INDEX: 27.67 KG/M2 | HEART RATE: 75 BPM | SYSTOLIC BLOOD PRESSURE: 136 MMHG | HEIGHT: 62 IN
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
## 2025-03-07 DIAGNOSIS — M54.16 LUMBAR RADICULOPATHY: ICD-10-CM

## 2025-03-07 DIAGNOSIS — M25.551 BILATERAL HIP PAIN: ICD-10-CM

## 2025-03-07 DIAGNOSIS — M25.552 BILATERAL HIP PAIN: Primary | ICD-10-CM

## 2025-03-07 DIAGNOSIS — M25.551 BILATERAL HIP PAIN: Primary | ICD-10-CM

## 2025-03-07 DIAGNOSIS — M25.552 BILATERAL HIP PAIN: ICD-10-CM

## 2025-03-07 PROCEDURE — 73521 X-RAY EXAM HIPS BI 2 VIEWS: CPT | Mod: 26,HCNC,, | Performed by: RADIOLOGY

## 2025-03-07 PROCEDURE — 99999 PR PBB SHADOW E&M-EST. PATIENT-LVL IV: CPT | Mod: PBBFAC,HCNC,, | Performed by: NURSE PRACTITIONER

## 2025-03-07 PROCEDURE — 73521 X-RAY EXAM HIPS BI 2 VIEWS: CPT | Mod: TC,HCNC

## 2025-03-07 PROCEDURE — 72114 X-RAY EXAM L-S SPINE BENDING: CPT | Mod: TC,HCNC

## 2025-03-07 PROCEDURE — 72114 X-RAY EXAM L-S SPINE BENDING: CPT | Mod: 26,HCNC,, | Performed by: RADIOLOGY

## 2025-03-07 RX ORDER — METHYLPREDNISOLONE ACETATE 40 MG/ML
40 INJECTION, SUSPENSION INTRA-ARTICULAR; INTRALESIONAL; INTRAMUSCULAR; SOFT TISSUE
Status: COMPLETED | OUTPATIENT
Start: 2025-03-07 | End: 2025-03-07

## 2025-03-07 RX ADMIN — METHYLPREDNISOLONE ACETATE 40 MG: 40 INJECTION, SUSPENSION INTRA-ARTICULAR; INTRALESIONAL; INTRAMUSCULAR; SOFT TISSUE at 10:03

## 2025-03-10 ENCOUNTER — TELEPHONE (OUTPATIENT)
Dept: PAIN MEDICINE | Facility: CLINIC | Age: 70
End: 2025-03-10
Payer: MEDICARE

## 2025-03-10 NOTE — TELEPHONE ENCOUNTER
----- Message from Brynn sent at 3/10/2025 10:15 AM CDT -----  Contact: Nadia  .Patient is calling to speak with the nurse regarding questions and concerns . Reports having pain in the left side and wanting to speak with someone about it  . Please give patient a call back at .[phone]

## 2025-03-10 NOTE — TELEPHONE ENCOUNTER
----- Message from Brynn sent at 3/10/2025 10:15 AM CDT -----  Contact: Nadia  .Patient is calling to speak with the nurse regarding questions and concerns . Reports having pain in the left side and wanting to speak with someone about it  . Please give patient a call back at .[phone]

## 2025-03-10 NOTE — TELEPHONE ENCOUNTER
Called patient from messages and patient informed me that she was still experiencing pain on the buttock area on the left side. I informed patient that I would send a message over to the provider for her suggestions. Pt verbalized understanding.    Yonatan PALMER

## 2025-03-11 ENCOUNTER — TELEPHONE (OUTPATIENT)
Dept: PRIMARY CARE CLINIC | Facility: CLINIC | Age: 70
End: 2025-03-11
Payer: MEDICARE

## 2025-03-11 ENCOUNTER — TELEPHONE (OUTPATIENT)
Dept: PAIN MEDICINE | Facility: CLINIC | Age: 70
End: 2025-03-11
Payer: MEDICARE

## 2025-03-11 DIAGNOSIS — M54.9 DORSALGIA, UNSPECIFIED: Primary | ICD-10-CM

## 2025-03-11 DIAGNOSIS — Z94.1 HEART TRANSPLANTED: Chronic | ICD-10-CM

## 2025-03-11 RX ORDER — CYCLOSPORINE 25 MG/1
75 CAPSULE, LIQUID FILLED ORAL 2 TIMES DAILY
Qty: 540 CAPSULE | Refills: 3 | OUTPATIENT
Start: 2025-03-11

## 2025-03-11 NOTE — TELEPHONE ENCOUNTER
----- Message from Corin Lincoln NP sent at 3/11/2025  7:48 AM CDT -----  Contact: Nadia  Due to her increased pain, I spoke to Dr. Pierre, please get her scheduled for CT of her back as soon as possible and let her know we are doing this to check for possible fracture  ----- Message -----  From: Yonatan Hutton MA  Sent: 3/10/2025   3:08 PM CDT  To: Corin Lincoln NP    Patient stated she was still experiencing left side buttock pain from the nerve block and wanted to know what you recommend for the pain.  ----- Message -----  From: Brynn Hobbs  Sent: 3/10/2025  10:16 AM CDT  To: Latonia Coombs Staff    .Patient is calling to speak with the nurse regarding questions and concerns . Reports having pain in the left side and wanting to speak with someone about it  . Please give patient a call back at .[phone]

## 2025-03-11 NOTE — TELEPHONE ENCOUNTER
Spoke with pt and on the advice of the provider, the pt needs to speak with her transplant pt about refilling Cyclosporine, pt verbalize understanding. Said she would get in touch with transplant support. Pt also stated that she needs to discuss medication change. Pt statesshe will keep appointment in April to do so.     WALLY

## 2025-03-12 ENCOUNTER — OFFICE VISIT (OUTPATIENT)
Dept: PALLIATIVE MEDICINE | Facility: CLINIC | Age: 70
End: 2025-03-12
Payer: MEDICARE

## 2025-03-12 ENCOUNTER — INFUSION (OUTPATIENT)
Dept: INFUSION THERAPY | Facility: HOSPITAL | Age: 70
End: 2025-03-12
Attending: INTERNAL MEDICINE
Payer: MEDICARE

## 2025-03-12 VITALS
HEIGHT: 62 IN | WEIGHT: 150.38 LBS | TEMPERATURE: 97 F | SYSTOLIC BLOOD PRESSURE: 157 MMHG | DIASTOLIC BLOOD PRESSURE: 97 MMHG | OXYGEN SATURATION: 99 % | BODY MASS INDEX: 27.67 KG/M2 | HEART RATE: 90 BPM

## 2025-03-12 VITALS — RESPIRATION RATE: 16 BRPM | SYSTOLIC BLOOD PRESSURE: 135 MMHG | DIASTOLIC BLOOD PRESSURE: 80 MMHG

## 2025-03-12 DIAGNOSIS — Z94.1 HEART TRANSPLANTED: Chronic | ICD-10-CM

## 2025-03-12 DIAGNOSIS — D63.1 ANEMIA ASSOCIATED WITH STAGE 5 CHRONIC RENAL FAILURE: ICD-10-CM

## 2025-03-12 DIAGNOSIS — M81.8 OTHER OSTEOPOROSIS WITHOUT CURRENT PATHOLOGICAL FRACTURE: Primary | ICD-10-CM

## 2025-03-12 DIAGNOSIS — Z51.5 ENCOUNTER FOR PALLIATIVE CARE: ICD-10-CM

## 2025-03-12 DIAGNOSIS — Q21.12 PATENT FORAMEN OVALE: Chronic | ICD-10-CM

## 2025-03-12 DIAGNOSIS — M54.41 BILATERAL LOW BACK PAIN WITH BILATERAL SCIATICA, UNSPECIFIED CHRONICITY: Primary | ICD-10-CM

## 2025-03-12 DIAGNOSIS — N18.5 CKD (CHRONIC KIDNEY DISEASE) STAGE 5, GFR LESS THAN 15 ML/MIN: Chronic | ICD-10-CM

## 2025-03-12 DIAGNOSIS — N18.5 ANEMIA ASSOCIATED WITH STAGE 5 CHRONIC RENAL FAILURE: ICD-10-CM

## 2025-03-12 DIAGNOSIS — M54.42 BILATERAL LOW BACK PAIN WITH BILATERAL SCIATICA, UNSPECIFIED CHRONICITY: Primary | ICD-10-CM

## 2025-03-12 PROCEDURE — 63600175 PHARM REV CODE 636 W HCPCS: Mod: JZ,EC,TB,HCNC | Performed by: NURSE PRACTITIONER

## 2025-03-12 PROCEDURE — 96372 THER/PROPH/DIAG INJ SC/IM: CPT | Mod: HCNC

## 2025-03-12 PROCEDURE — 99999 PR PBB SHADOW E&M-EST. PATIENT-LVL IV: CPT | Mod: PBBFAC,HCNC,, | Performed by: NURSE PRACTITIONER

## 2025-03-12 RX ORDER — CYCLOSPORINE 25 MG/1
75 CAPSULE, LIQUID FILLED ORAL 2 TIMES DAILY
Qty: 540 CAPSULE | Refills: 11 | Status: SHIPPED | OUTPATIENT
Start: 2025-03-12

## 2025-03-12 RX ADMIN — EPOETIN ALFA-EPBX 20000 UNITS: 20000 INJECTION, SOLUTION INTRAVENOUS; SUBCUTANEOUS at 02:03

## 2025-03-12 NOTE — ASSESSMENT & PLAN NOTE
Unfortunate recent development involving transplanted heart.  No new cardiac sx today.   Followed by [facility] transplant team and Dr King.

## 2025-03-12 NOTE — ASSESSMENT & PLAN NOTE
Goals of care: What is most important right now is to focus on remaining as independent as possible, symptom/pain control, and improvement in condition but with limits to invasive therapies.  Advanced directive: Full code. Becomes extremely anxious, agitated during discussions so we have decided to forego further discussions for now.   ZULY POA: Moy Neir  Symptoms: Back pain  Follow up: 3 months

## 2025-03-12 NOTE — ASSESSMENT & PLAN NOTE
Planning to proceed with HD   Plans for vascular access once back pain better controlled.   Renal dose medications.

## 2025-03-12 NOTE — DISCHARGE INSTRUCTIONS
[place]  [address] [facility]  [address]  [phone] phone     [phone] fax  Hours of Operation: Monday- Friday 8:00am- 5:00pm  After hours phone  [phone]  Hematology / Oncology Physicians on call      SALENA Thurman Dr., NP Phaon Dunbar, NP Khelsea Conley, FNP    Please call with any concerns regarding your appointment today.

## 2025-03-12 NOTE — PROGRESS NOTES
Palliative Medicine Clinic Note        Consult Requested By: No ref. provider found      Reason for Consult: symptom management and goals of care    Chief Complaint:   Chief Complaint   Patient presents with    Pain And Symptom Management        ASSESSMENT/PLAN:      Plan/Recommendations:  1. Bilateral low back pain with bilateral sciatica, unspecified chronicity  Comments:  Followed by pain mgmt, PCP. May benefit from acupuncture for LBP, will refer.  Orders:  -     Acupuncture; Future; Expected date: 03/13/2025    2. CKD (chronic kidney disease) stage 5, GFR less than 15 ml/min  Assessment & Plan:  Planning to proceed with HD   Plans for vascular access once back pain better controlled.   Renal dose medications.      3. Heart transplanted  Overview:  5/93     Assessment & Plan:  Followed by Dr King and transplant team.  No new cardiac sx today      4. Patent foramen ovale  Overview:  Echo w bubble study 7/20/24     Left Ventricle: The left ventricle is normal in size. Normal wall thickness. There is normal systolic function with a visually estimated ejection fraction of 55 - 60%. There is normal diastolic function.    Right Ventricle: Normal right ventricular cavity size. Wall thickness is normal. Systolic function is normal.    Left Atrium: Patent foramen ovale visualized with predominant right to left shunting indicated by saline contrast.    Tricuspid Valve: There is mild regurgitation.    IVC/SVC: Normal venous pressure at 3 mmHg.    The patient is status post cardiac transplantation.    Assessment & Plan:  Unfortunate recent development involving transplanted heart.  No new cardiac sx today.   Followed by [facility] transplant team and Dr King.      5. Encounter for palliative care  Assessment & Plan:  Goals of care: What is most important right now is to focus on remaining as independent as possible, symptom/pain control, and improvement in condition but with limits to invasive therapies.  Advanced directive:  Full code. Becomes extremely anxious, agitated during discussions so we have decided to forego further discussions for now.   HC POA: Moy Neri  Symptoms: Back pain  Follow up: 3 months            Advance Care Planning   Advance Directives:   Living Will: Yes        Copy on chart: Yes    LaPOST: No    Do Not Resuscitate Status: No    Medical Power of : Yes      Decision Making:  Patient answered questions  Goals of Care: What is most important right now is to focus on remaining as independent as possible, symptom/pain control, improvement in condition but with limits to invasive therapies. Accordingly, we have decided that the best plan to meet the patient's goals includes continuing with treatment.           Thank you for involving me in the care of this patient.     No follow-ups on file.    Future Appointments   Date Time Provider Department Center   3/13/2025  7:15 AM HGV CT1 LIMIT 500 LBS [facility] CT SCAN [facility]   3/24/2025 11:00 AM LABORATORY, [facility] INDRA ONL LAB [facility]   3/24/2025 11:20 AM SPECIMEN, [facility] ON SPECLAB [facility]   3/26/2025  1:30 PM INJECTION 1, [facility] INFUSION [facility] INFSN [facility]   3/31/2025  8:30 AM Gunner Melgar MD ONLC NEPHRO  [facility]   4/3/2025 10:00 AM ONLH MAMMO1 ON MAMMO [facility]   4/9/2025  8:30 AM [place] CC LAB [facility] LAB DS [facility]   4/9/2025  9:30 AM Yudith Mendoza NP [facility] BHEMON [facility]   4/9/2025 10:40 AM Christoph Douglas MD [facility] GENSUR [facility]   4/10/2025  9:00 AM Jassi Pierre MD [facility] INT JUDIT [facility]   4/14/2025 11:00 AM Kristine Delgado NP [facility] BRESUR [facility]   4/25/2025  8:20 AM Elis Wick MD BS 65PLUS [facility]   4/25/2025 10:45 AM Sami Cochran MD ONLC UROLOGY  [facility]   6/9/2025  1:45 PM Paxton Vasques OD ONLC OPHTHAL BR [facility]        SUBJECTIVE:      History of Present Illness / Interval History:  Nadia Damon is 69 y.o. female with HFpEF, s/p heart transplant 1993, patent foramen ovale in transplanted heart, CKD  4-5, anemia of chronic disease, breast cancer, spinal stenosis, urinary incontinence.       Presents to Palliative Care Clinic for symptom mgmt, clarification of goals of care, and additional support.    3/12/25  History obtained from: patient and medical record.    Lots of low back pain. No improvement since Interstim removed. Fell off of a ladder few weeks ago, then rolled of couch last week. X-rays unrevealing. Pain mgmt has CT scheduled tomorrow.   Pain radiates to left hip, states she felt like bones were rubbing together. Had injection on 3/7, much less pain since. Pain mostly relieved with tizanidine. Oxycodone also helpful, but not as much as tizanidine.     Planning to schedule vascular access for HD after back procedure.     Has had acupuncture previously through PT... Dry needling??     Doesn't want us to call her son, it may upset him. She requested rearrangement of who to notify in case of emergency, add her friend Sandee Butler to list. Mrs Butler has been helping her with shopping, care around home.     Today we discussed symptom management and goals of care.      SAEED CappsW, is very helpful.     [employer] case mgmt may be able to provide assistance in home after she has vascular access due to limited use of dominant arm. Mom's meals - referred to them.   [phone]    ROS:  Review of Systems    Review of Symptoms      Symptom Assessment (ESAS 0-10 Scale)  Pain:  9  Dyspnea:  3  Anxiety:  0  Nausea:  2  Depression:  0  Anorexia:  0  Fatigue:  0  Insomnia:  6  Restlessness:  8  Agitation:  6     Constipation:  Negative  Diarrhea:  Negative      Pain Assessment:    Location(s): back    Back       Location: lower and bilateral        Quality: Aching and burning        Quantity: 9/10 in intensity        Chronicity: Onset 4 month(s) ago, gradually worsening        Aggravating Factors: Movement        Alleviating Factors: None       Associated Symptoms: None    Performance Status:  60    Living Arrangements:   Lives alone and Lives in apartment    Psychosocial/Cultural:   See Palliative Psychosocial Note: Yes  Friend Sandee Butler assists her with errands, in her home, etc. One living son lives in [place], son has a lot of stress.  **Primary  to Follow**  Palliative Care  Consult: No        Medications:  Current Medications[1]    External  database queried on 03/12/2025  by Luz CORTEZ :     N/A    Review of patient's allergies indicates:   Allergen Reactions    Dobutamine Other (See Comments)     Severe Left sided chest pain after dosage administered for Dobutamine Stress Test; itching    Lisinopril Swelling and Rash    Hydrocodone-acetaminophen Nausea Only    Augmentin [amoxicillin-pot clavulanate] Diarrhea    Zyvox [linezolid] Nausea And Vomiting        OBJECTIVE:   Physical Exam:  Vitals: Temp: 97 °F (36.1 °C) (03/12/25 1321)  Pulse: 90 (03/12/25 1321)  BP: (!) 157/97 (03/12/25 1321)  SpO2: 99 % (03/12/25 1321)    Physical Exam    Wt Readings from Last 3 Encounters:   03/12/25 1321 68.2 kg (150 lb 5.7 oz)   03/07/25 0945 68.2 kg (150 lb 5.7 oz)   03/04/25 1321 68.2 kg (150 lb 7.4 oz)     BP Readings from Last 3 Encounters:   03/12/25 (!) 157/97   03/07/25 136/66   03/04/25 (!) 158/89       Labs:  Lab Results   Component Value Date    WBC 3.62 (L) 02/24/2025    WBC 3.62 (L) 02/24/2025    HGB 9.1 (L) 02/24/2025    HGB 9.1 (L) 02/24/2025    HCT 29.2 (L) 02/24/2025    HCT 29.2 (L) 02/24/2025    MCV 92 02/24/2025    MCV 92 02/24/2025     02/24/2025     02/24/2025           Imaging:  reviewed     I spent a total of 40 minutes on the day of the visit. This includes face to face time in discussion of goals of care, symptom assessment, coordination of care and emotional support.  This also includes non-face to face time preparing to see the patient (eg, review of tests/imaging), obtaining and/or reviewing separately obtained history, documenting clinical information in the  electronic or other health record, independently interpreting results and communicating results to the patient/family/caregiver, or care coordinator.     Additional 20 min time spent on a voluntary advance care planning and /or goals of care discussion, providing emotional support, formulating and communicating prognosis and exploring burden/benefit of various approaches of treatment.       Luz Dickson NP         [1]   Current Outpatient Medications:     aspirin (ECOTRIN) 81 MG EC tablet, Take 1 tablet (81 mg total) by mouth once daily., Disp: , Rfl:     calcitRIOL (ROCALTROL) 0.25 MCG Cap, Take 1 capsule (0.25 mcg total) by mouth once daily., Disp: 30 capsule, Rfl: 11    carvediloL (COREG) 3.125 MG tablet, Take 1 tablet (3.125 mg total) by mouth 2 (two) times daily with meals., Disp: 180 tablet, Rfl: 0    cycloSPORINE modified, NEORAL, (NEORAL) 25 MG capsule, Take 3 capsules (75 mg total) by mouth 2 (two) times daily., Disp: 540 capsule, Rfl: 11    furosemide (LASIX) 40 MG tablet, Take 1 tablet (40 mg total) by mouth once daily., Disp: 30 tablet, Rfl: 11    LIDOcaine (LIDODERM) 5 %, Place 1 patch onto the skin once daily. Remove & Discard patch within 12 hours or as directed by MD, Disp: 30 patch, Rfl: 2    mupirocin (BACTROBAN) 2 % ointment, Apply topically 2 (two) times daily., Disp: 22 g, Rfl: 0    NIFEdipine (PROCARDIA-XL) 60 MG (OSM) 24 hr tablet, Take 1 tablet (60 mg total) by mouth once daily., Disp: 30 tablet, Rfl: 11    nitroGLYCERIN (NITROSTAT) 0.4 MG SL tablet, PLACE 1 TABLET UNDER THE TONGUE EVERY 5 MINS AS NEEDED FOR CHEST PAIN FOR UP TO 3 DOSES.IF CONTINUED HEART PAIN/PRESSURE AFTER, Disp: 100 tablet, Rfl: 0    ondansetron (ZOFRAN) 4 MG tablet, Take 1 tablet (4 mg total) by mouth every 12 (twelve) hours as needed for Nausea., Disp: 16 tablet, Rfl: 0    oxyCODONE-acetaminophen (PERCOCET) 5-325 mg per tablet, Take 1 tablet by mouth every 4 (four) hours as needed for Pain., Disp: 25 tablet, Rfl: 0     pantoprazole (PROTONIX) 20 MG tablet, TAKE 1 TABLET EVERY DAY, Disp: 90 tablet, Rfl: 1    patiromer calcium sorbitex (VELTASSA) 8.4 gram PwPk, Take 1 packet (8.4 g total) by mouth once daily., Disp: 90 packet, Rfl: 0    polyethylene glycol (GLYCOLAX) 17 gram PwPk, Take 17 g by mouth once daily., Disp: , Rfl:     pregabalin (LYRICA) 50 MG capsule, TAKE 1 CAPSULE TWICE DAILY, Disp: 180 capsule, Rfl: 0    sodium bicarbonate 650 MG tablet, Take 1 tablet (650 mg total) by mouth 2 (two) times daily., Disp: 60 tablet, Rfl: 1    temazepam (RESTORIL) 30 mg capsule, TAKE 1 CAPSULE EVERY NIGHT AS NEEDED FOR INSOMNIA, Disp: 30 capsule, Rfl: 1    tiZANidine (ZANAFLEX) 2 MG tablet, Take 2 tablets (4 mg total) by mouth 2 (two) times a day., Disp: 120 tablet, Rfl: 5    vitamin E 400 UNIT capsule, Take 400 Units by mouth once daily., Disp: , Rfl:     zolpidem (AMBIEN) 5 MG Tab, Take 1 tablet (5 mg total) by mouth every evening., Disp: 30 tablet, Rfl: 5

## 2025-03-12 NOTE — Clinical Note
Hi - I'm not succeeding in further ACP discussions with Ms Zuniga. Each time we discuss ACP she gets tachypneic and develops CP. I have her down with a 3 month f/u. Please let me know if anything changes before then. TY

## 2025-03-13 ENCOUNTER — HOSPITAL ENCOUNTER (OUTPATIENT)
Dept: RADIOLOGY | Facility: HOSPITAL | Age: 70
Discharge: HOME | End: 2025-03-13
Attending: NURSE PRACTITIONER
Payer: MEDICARE

## 2025-03-13 ENCOUNTER — TELEPHONE (OUTPATIENT)
Dept: UROLOGY | Facility: CLINIC | Age: 70
End: 2025-03-13
Payer: MEDICARE

## 2025-03-13 DIAGNOSIS — M54.9 DORSALGIA, UNSPECIFIED: ICD-10-CM

## 2025-03-13 PROCEDURE — 72131 CT LUMBAR SPINE W/O DYE: CPT | Mod: TC,HCNC

## 2025-03-13 PROCEDURE — 72131 CT LUMBAR SPINE W/O DYE: CPT | Mod: 26,HCNC,, | Performed by: RADIOLOGY

## 2025-03-14 ENCOUNTER — TELEPHONE (OUTPATIENT)
Dept: PRIMARY CARE CLINIC | Facility: CLINIC | Age: 70
End: 2025-03-14
Payer: MEDICARE

## 2025-03-17 ENCOUNTER — RESULTS FOLLOW-UP (OUTPATIENT)
Dept: PAIN MEDICINE | Facility: CLINIC | Age: 70
End: 2025-03-17

## 2025-03-17 ENCOUNTER — TELEPHONE (OUTPATIENT)
Dept: PRIMARY CARE CLINIC | Facility: CLINIC | Age: 70
End: 2025-03-17
Payer: MEDICARE

## 2025-03-18 ENCOUNTER — CLINICAL SUPPORT (OUTPATIENT)
Dept: PRIMARY CARE CLINIC | Facility: CLINIC | Age: 70
End: 2025-03-18
Payer: MEDICARE

## 2025-03-18 DIAGNOSIS — F60.5 OBSESSIVE COMPULSIVE PERSONALITY DISORDER: Primary | ICD-10-CM

## 2025-03-18 DIAGNOSIS — F41.9 ANXIETY: ICD-10-CM

## 2025-03-18 DIAGNOSIS — F33.1 MODERATE EPISODE OF RECURRENT MAJOR DEPRESSIVE DISORDER: ICD-10-CM

## 2025-03-18 PROCEDURE — 99499 UNLISTED E&M SERVICE: CPT | Mod: HCNC,S$GLB,,

## 2025-03-18 NOTE — PROGRESS NOTES
DATE:  3/18/2025  REFERRAL SOURCE:  Elis Wick MD  TYPE OF VISIT:  In person  LENGTH OF SESSION: 60  .  HISTORY OF PRESENTING ILLNESS:  Nadia Damon, a 69 y.o. female with history of Anxiety disorders; anxiety, unspecified [F41.9], Major Depressive Disorder, Recurrent, Moderate (F33.1), and Persistent insomnia with other symptoms [G47.00].       CHIEF COMPLAINT/REASON FOR ENCOUNTER: Pt's chief complaint includes the following:  sleep, and grief.    PSYCHIATRIC HISTORY:  Patient does not currently have a psychiatrist.    Patient does not currently have a therapist.     Pt is taking temazepam (Restoril) 30mg once daily and zolpidem (Ambien) 0.5mg once daily for sleep.  They are not interested in medication changes.  Previous Psychiatric Hospitalizations:  No  History of Trauma:  Heart transplant; CVA.    History of Violence:  No  Access to a gun/weapon:  No  Previous Suicide Attempts:  No    Risk assessment:  Patient reports no suicidal ideation  Patient reports no homicidal ideation  Patient reports no self-injurious behavior  Patient reports no violent behavior    PSYCHIATRIC FAMILY HISTORY:  Parents were alcoholic; SonMoy is an alcoholic.  Grandson has hx of depression      SOCIAL HISTORY (MARRIAGE, EMPLOYMENT, etc.):  Living Situation: Alone, at [facility].   Relationship status Single; never .   Children/Family: 2 sons. 1 sonFlakito, is .  SonMoy Jr lives in [place] with wife.  Estranged granddtrQiana.  Estranged grandsonMoy.  Cousin.   Supports:[employer] friends.   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  Tenriism/Spirituality: Evangelical - Living [employer]. Volunteers as a .   Hobbies and Interests: Crafting.     Current social stressors:   CVA 2024.  Loss of driving.CKD 4. May need dialysis soon.  Hx of heart transplant in .  Transplanted heart now at [facility].   Grandson's arrest in 2024; news  "coverage of his arrest.    Death of son, Flakito.  Years ago. Loss of contact with Flakito's daughter, Qiana (or Alda Kiran).       Current symptoms:  Depression: decreased appetite.  Anxiety: denies.  Insomnia:  chronic insomnia. Years of dealing with this.  .  Astrid:  pressured speech.  Psychosis: denies .    MENTAL HEALTH STATUS EXAM  General Appearance:  unremarkable, age appropriate   Speech: normal pitch, normal volume, pressured, rapid      Level of Cooperation: cooperative      Thought Processes: normal and logical   Mood: steady      Thought Content: normal, no suicidality, no homicidality, delusions, or paranoia   Affect: congruent and appropriate   Orientation: Oriented x3   Memory: Appears WNL; patient not tested.    Attention Span & Concentration: intact   Fund of General Knowledge: intact and appropriate to age and level of education   Judgment & Insight: good   Language  intact     Session Content/Presenting Problem Hx:  November 2024: PHQ 13; LUPE 15  February 2025 PHQ/LUPE: 16/16  Goal is to resume her active life.   From initial assessment: She says "I go up up up then will cry half the day." Patient describes herself as someone who will try to push through any obstacles; she tries not to let things get her down. Ends up feeling overwhelmed. Fear of being judged by others.      6th session.  Patient had to cancel prior session due to having fallen again.  She again appears to be in pain; she walks slowly and stiffly, using a cane.   Patient engages in talking about her 7 yo great granddaughter, Angel, who likes to play doctor for Nadia.  Nadia bought her a cute play doctor's outfit; shows pictures.   Patient spends time today discussing ongoing family stressors. She becomes tearful and sad while talking about her son Moy's alcoholism, his refusal to attend AA meetings, and his frequent belligerence.  She describes Moy's wife calling her often to express her own worry and stress.They are " "living apart but his wife is still very much involved in Moy's life.    Educated patient on alcoholism and codependency. Patient acknowledges the futility of trying to change her son; she recognizes that the harder she tries to change him the harder he pushes back. Also explored the lack of maturity often present in someone with a substance abuse disorder. Patient is encouraged to establish boundaries and to protect her own sense of wellbeing. Patient reports having told Moy she does not want him to curse when talking to her.  She will consider using boundaries to limit her daughter in law's "dumping" of emotion onto her.  Patient expresses sadness as she considers how difficult it is for her to communicate with her son. Encouraged her to consider Moy as emotionally immature due to his years of relying on alcohol to cope with life stressors. Patient expresses understanding of this concept.  Discussed the concept of anxiety and avoidance with patient.  Explored how her desire to avoid anxious thoughts may be encouraging her perfectionism and her need to clean and organize.  Patient describes feeling overwhelmed by worries; she then becomes distracted by something that needs to be cleaned or organized or decorated.   Patient becomes tearful as she agrees that she is exhausted by this cycle of anxiety and avoidance.  She mentions the death of her son, Flakito, at age 30; she continues to grieve that loss.  Patient is supported as she talks of his death.    Discussed the concept of becoming aware of our thoughts and their connection to our actions. Explored the practice of replacing negative thoughts with more positive thoughts.  Patient expresses a fear of her son Moy also dying; she says she would then have no one to support her. She begins a litany of "no mom, no dad, no brother, no sister, etc." Asked patient if that is a true statement. Patient admits that she actually relies on her granddaughter to " "be supportive and helpful when needed; she has named her as her emergency contact.  Discussed with patient how using thought stopping and replacing automatic negative thoughts with more positive ones can help her mood. Used "Devil and Bryce on each shoulder" to explain the opposing thoughts.  Because patient's beth plays an important role in her life she is encouraged to acknowledge that her beth tells her that God puts people in her life to help her and she should not refuse them.  Patient understands and agrees with these sentiments.   Practiced with patient saying Yes instead of No, through a role play exercise.  Patient has difficulty saying Yes when offered assistance, even in a role playing scenario.  She agrees to try to say Yes to the next person that offers to assist her.  Patient is encouraged to review the anxiety-avoidance handout. She is also encouraged to begin a daily practice of writing down her fears and worries as a way of releasing some of that negative energy.  Discussed with patient the possibility of her starting on an antidepressant in the future; reassured her that the doctor is exploring options for her given her complicated health status.      Prior Session:   5th session. Discussed in more detail with patient how her days go regarding her housework and her need to get things done. Patient describes needing things to be just right.  Cannot stand disorder.  Must dust every day. Must say a long list of prayers every day.  Patient habitually tells people no when they offer to help her in anyway.  She pushes herself to exhaustion despite being in physical pain.   Patient describes her thoughts as: "If I don't do these things (clean, decorate) then I will hurt even more. I have to keep going and doing because if I sit I will hurt more. "  She is aware that she has hurt herself because her mind kept telling her to keep going even when she knew she was exhausted or hurting or just not safe.  " "This is how she has fallen on numerous occasions.   Patient says she deliberately will not take pain medicine because she does not want to feel sleepy and tired; she does not want the medicine to help her rest.   Patient describes friends and family continually asking her to please stop and to let them help.  They encourage her to sit and to rest.  Patient is aware that by saying no she is pushing people away.   She says "I want to stop doing this, I want to let others help me, I want to rest, I am so tired."  Patient describes fighting off sleep; always thinking of something else she can do.  She does not sleep in her bed.  She sleeps in a chair.  Says she has tried a few times to sleep in her bed but she cannot relax with trying to keep the covers perfect; she will try to sleep on "an inch" of the bed.   Patient discusses her time at rehab last year after her stroke. Even then she would not stop and rest despite the staff urging her to lie down.  She spent time cleaning the room and the bathroom. Patient describes an overwhelming need to have order.    Patient expresses a desire to slow down and relax but also expresses feeling unable to do that.    Empathy and support are provided as Nadia described her pattern of anxious thoughts and compulsive behaviors.  Focus is on building a level of trust with Nadia through active listening, unconditional positive regard and acceptance.   Nadia appears to show symptoms of OCPD: preoccupation with order and details; perfectionism; devotion to activity and productivity; fear of being perceived as a failure; refusing to work with others or to delegate tasks; and becoming overly fixated on a task.  Her inability to change her behaviors or to accept help from others has negatively affected her relationships with her family and her friends.  It has also affected her physical health.   Patient agrees that LCSW may discuss her symptoms with the MD and the Psych PA for possible " medication recommendations. She notes that she was previously on Cymbalta but that was discontinued.      STRENGTHS AND LIABILITIES: Strength: Patient is expressive/articulate., Strength: Patient is motivated for change., Strength: Patient has positive support network.    IMPRESSION:   My diagnostic impression is Anxiety disorders; anxiety, unspecified [F41.9], MAJOR DEPRESSIVE DISORDER, SINGLE EPISODE, Moderate (F32.1), and uncomp bereavement, as evidenced by patient's description of increased feelings of sadness and fear related to recent hospitalization; feeling down at times, tearfulness, sadness from loss of son, grandson, granddaughter, family stressors, health stressors. She does not sleep well.     TREATMENT GOALS: Anxiety: reducing negative automatic thoughts, reducing physical symptoms of anxiety, and reducing time spent worrying (<30 minutes/day)  Depression: increasing self-reward for positive behaviors (one/day), increasing self-reward for positive thoughts (one/day), and reducing fatigue  Grief: process grief related to son's death, estrangement from a grandson and a granddaughter.     PLAN: In this session a psychosocial evaluation was conducted to get history and determine a treatment plan. CBT will be utilized in future individual  therapy sessions to increase interaction, insight, support, and behavior modification.     NEXT APPOINTMENT:  3/28/25

## 2025-03-19 ENCOUNTER — HOSPITAL ENCOUNTER (OUTPATIENT)
Facility: HOSPITAL | Age: 70
Discharge: HOME | End: 2025-03-19
Attending: ANESTHESIOLOGY | Admitting: ANESTHESIOLOGY
Payer: MEDICARE

## 2025-03-19 VITALS
HEIGHT: 62 IN | WEIGHT: 146.25 LBS | SYSTOLIC BLOOD PRESSURE: 143 MMHG | OXYGEN SATURATION: 100 % | DIASTOLIC BLOOD PRESSURE: 82 MMHG | TEMPERATURE: 98 F | HEART RATE: 92 BPM | RESPIRATION RATE: 14 BRPM | BODY MASS INDEX: 26.91 KG/M2

## 2025-03-19 DIAGNOSIS — M47.816 LUMBAR SPONDYLOSIS: Primary | ICD-10-CM

## 2025-03-19 PROCEDURE — 63600175 PHARM REV CODE 636 W HCPCS: Mod: HCNC | Performed by: ANESTHESIOLOGY

## 2025-03-19 PROCEDURE — 64494 INJ PARAVERT F JNT L/S 2 LEV: CPT | Mod: HCNC,LT | Performed by: ANESTHESIOLOGY

## 2025-03-19 PROCEDURE — 64493 INJ PARAVERT F JNT L/S 1 LEV: CPT | Mod: HCNC,LT | Performed by: ANESTHESIOLOGY

## 2025-03-19 PROCEDURE — 64493 INJ PARAVERT F JNT L/S 1 LEV: CPT | Mod: HCNC,LT,, | Performed by: ANESTHESIOLOGY

## 2025-03-19 PROCEDURE — 64494 INJ PARAVERT F JNT L/S 2 LEV: CPT | Mod: HCNC,LT,, | Performed by: ANESTHESIOLOGY

## 2025-03-19 RX ORDER — ONDANSETRON HYDROCHLORIDE 2 MG/ML
4 INJECTION, SOLUTION INTRAVENOUS ONCE AS NEEDED
Status: COMPLETED | OUTPATIENT
Start: 2025-03-19 | End: 2025-03-19

## 2025-03-19 RX ORDER — BUPIVACAINE HYDROCHLORIDE 5 MG/ML
INJECTION, SOLUTION EPIDURAL; INTRACAUDAL; PERINEURAL
Status: DISCONTINUED | OUTPATIENT
Start: 2025-03-19 | End: 2025-03-19 | Stop reason: HOSPADM

## 2025-03-19 RX ORDER — MIDAZOLAM HYDROCHLORIDE 1 MG/ML
INJECTION, SOLUTION INTRAMUSCULAR; INTRAVENOUS
Status: DISCONTINUED | OUTPATIENT
Start: 2025-03-19 | End: 2025-03-19 | Stop reason: HOSPADM

## 2025-03-19 RX ORDER — FENTANYL CITRATE 50 UG/ML
INJECTION, SOLUTION INTRAMUSCULAR; INTRAVENOUS
Status: DISCONTINUED | OUTPATIENT
Start: 2025-03-19 | End: 2025-03-19 | Stop reason: HOSPADM

## 2025-03-19 RX ADMIN — ONDANSETRON 4 MG: 2 INJECTION INTRAMUSCULAR; INTRAVENOUS at 06:03

## 2025-03-19 NOTE — DISCHARGE INSTRUCTIONS

## 2025-03-19 NOTE — OP NOTE
LUMBAR Medial Branch Block Under Fluoroscopy,  Left L4/5 and L5/S1    INFORMED CONSENT: The procedure, risks, benefits and options were discussed with patient. There are no contraindications to the procedure. The patient expressed understanding and agreed to proceed. The personnel performing the procedure was discussed.    Date of procedure 03/19/2025    Time-out taken to identify patient and procedure side prior to starting the procedure.                                                                PROCEDURE:  Left medial branch block at the transverse processes at the level of L4, L5, and the sacral ala    Pre Procedure diagnosis:    Lumbar spondylosis [M47.816]  1. Lumbar spondylosis        Post-Procedure diagnosis:   same    PHYSICIAN: Jassi Pierre MD  ASSISTANTS: None    MEDICATIONS INJECTED: 0.5% bupivicane, 1mL at each level    LOCAL ANESTHETIC USED: Lidocaine, 1%, 1ml at each level    ESTIMATED BLOOD LOSS:  None.    COMPLICATIONS:  None.    Sedation: Conscious sedation provided by M.D    SEDATION MEDICATIONS: local/IV sedation: Versed 1 mg and fentanyl 50 mcg IV.  Conscious sedation ordered by MD.  Patient reevaluated and sedation administered by MD and monitored by RN.  Total sedation time was less than 10 min.    Total sedation time was <10 min      TECHNIQUE: Laying in a prone position, the patient was prepped and draped in the usual sterile fashion using ChloraPrep and fenestrated drape.  The level was determined under fluoroscopic guidance.  Local anesthetic was given by going down to the hub of the 27-gauge 1.25in needle and raising a wheel.  A 25-gauge 3.5inch needle was introduced to the anatomic local of the medial branch at each of the above levels using fluoroscopy in the AP, oblique, and lateral views.  After negative aspiration, medication was injected slowly. The patient tolerated the procedure well.       The patient was monitored after the procedure.  Patient was given post procedure  and discharge instructions to follow at home.  We will see the patient back in two weeks or the patient may call to inform of status. The patient was discharged in a stable condition

## 2025-03-19 NOTE — DISCHARGE SUMMARY
Discharge Note  Short Stay      SUMMARY     Admit Date: 3/19/2025    Attending Physician: Jassi Pierre MD        Discharge Physician: Jassi Pierre MD        Discharge Date: 3/19/2025 7:23 AM    Procedure(s) (LRB):  Left L4-5 and L5-S1 MBB #2 with local (Left)    Final Diagnosis: Lumbar spondylosis [M47.816]    Disposition: Home or self care    Patient Instructions:   Current Discharge Medication List        CONTINUE these medications which have NOT CHANGED    Details   carvediloL (COREG) 3.125 MG tablet Take 1 tablet (3.125 mg total) by mouth 2 (two) times daily with meals.  Qty: 180 tablet, Refills: 0    Comments: .      NIFEdipine (PROCARDIA-XL) 60 MG (OSM) 24 hr tablet Take 1 tablet (60 mg total) by mouth once daily.  Qty: 30 tablet, Refills: 11    Comments: .  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  Qty: 120 tablet, Refills: 5      zolpidem (AMBIEN) 5 MG Tab Take 1 tablet (5 mg total) by mouth every evening.  Qty: 30 tablet, Refills: 5    Associated Diagnoses: Primary insomnia      aspirin (ECOTRIN) 81 MG EC tablet Take 1 tablet (81 mg total) by mouth once daily.      calcitRIOL (ROCALTROL) 0.25 MCG Cap Take 1 capsule (0.25 mcg total) by mouth once daily.  Qty: 30 capsule, Refills: 11      cycloSPORINE modified, NEORAL, (NEORAL) 25 MG capsule Take 3 capsules (75 mg total) by mouth 2 (two) times daily.  Qty: 540 capsule, Refills: 11    Comments: Txp Date:5/27/1993 (Heart) Disch Date:  ICD10:Z94.1 Txp Location:[facility]  Associated Diagnoses: Heart transplanted      furosemide (LASIX) 40 MG tablet Take 1 tablet (40 mg total) by mouth once daily.  Qty: 30 tablet, Refills:  11    Associated Diagnoses: Chronic diastolic (congestive) heart failure      LIDOcaine (LIDODERM) 5 % Place 1 patch onto the skin once daily. Remove & Discard patch within 12 hours or as directed by MD  Qty: 30 patch, Refills: 2      mupirocin (BACTROBAN) 2 % ointment Apply topically 2 (two) times daily.  Qty: 22 g, Refills: 0    Associated Diagnoses: Immunocompromised patient      nitroGLYCERIN (NITROSTAT) 0.4 MG SL tablet PLACE 1 TABLET UNDER THE TONGUE EVERY 5 MINS AS NEEDED FOR CHEST PAIN FOR UP TO 3 DOSES.IF CONTINUED HEART PAIN/PRESSURE AFTER  Qty: 100 tablet, Refills: 0      ondansetron (ZOFRAN) 4 MG tablet Take 1 tablet (4 mg total) by mouth every 12 (twelve) hours as needed for Nausea.  Qty: 16 tablet, Refills: 0    Associated Diagnoses: Nausea and vomiting, unspecified vomiting type      oxyCODONE-acetaminophen (PERCOCET) 5-325 mg per tablet Take 1 tablet by mouth every 4 (four) hours as needed for Pain.  Qty: 25 tablet, Refills: 0    Comments: Quantity prescribed more than 7 day supply? No  Associated Diagnoses: Urge incontinence of urine      pantoprazole (PROTONIX) 20 MG tablet TAKE 1 TABLET EVERY DAY  Qty: 90 tablet, Refills: 1    Associated Diagnoses: Gastroesophageal reflux disease, unspecified whether esophagitis present      !! patiromer calcium sorbitex (VELTASSA) 8.4 gram PwPk Take 1 packet (8.4 g total) by mouth once daily  Qty: 30 packet, Refills: 6      !! patiromer calcium sorbitex (VELTASSA) 8.4 gram PwPk Take 1 packet (8.4 g total) by mouth once daily.  Qty: 90 packet, Refills: 0      polyethylene glycol (GLYCOLAX) 17 gram PwPk Take 17 g by mouth once daily.      sodium bicarbonate 650 MG tablet Take 1 tablet (650 mg total) by mouth 2 (two) times daily.  Qty: 60 tablet, Refills: 1      temazepam (RESTORIL) 30 mg capsule TAKE 1 CAPSULE EVERY NIGHT AS NEEDED FOR INSOMNIA  Qty: 30 capsule, Refills: 1    Associated Diagnoses: Primary insomnia      vitamin E 400 UNIT capsule Take 400 Units by  mouth once daily.       !! - Potential duplicate medications found. Please discuss with provider.              Discharge Diagnosis: Lumbar spondylosis [M47.816]  Condition on Discharge: Stable with no complications to procedure   Diet on Discharge: Same as before.  Activity: as per instruction sheet.  Discharge to: Home with a responsible adult.  Follow up: 2-4 weeks       Please call the office at [phone] if you experience any weakness or loss of sensation, fever > 101.5, pain uncontrolled with oral medications, persistent nausea/vomiting/or diarrhea, redness or drainage from the incisions, or any other worrisome concerns. If physician on call was not reached or could not communicate with our office for any reason please go to the nearest emergency department

## 2025-03-19 NOTE — H&P
HPI  Patient presenting for Procedure(s) (LRB):  Left L4-5 and L5-S1 MBB #2 with local (Left)     Patient on Anti-coagulation No    No health changes since previous encounter    Past Medical History:   Diagnosis Date    Abdominal wall hernia     CT Renal 6/11/2018---Small fat containing superior ventral abdominal wall hernia at the epicardial pacing lead site.    Abnormal mammogram 10/12/2021    Acute cystitis without hematuria 05/10/2022    Acute ischemic right middle cerebral artery (MCA) stroke 07/20/2024    Adverse drug reaction 11/12/2024    GRACE (acute kidney injury) 11/22/2021    Anxiety     Aphasia 07/19/2024    Arthritis     ZEN HIPS    Breast cancer in female 08/2021    LEFT BREAST    Breast mass, right 11/13/2024    RIGHT BREAST MASS (Problem)      Bronchitis 08/18/2016    Never smoked      C. difficile colitis 11/29/2021    Cellulitis of axilla, left 12/23/2021    Chronic diastolic heart failure 12/16/2021    Chronic kidney disease     stage 4, GFR 15-29 ml/min    Chronic midline low back pain without sciatica 06/18/2018    CKD (chronic kidney disease) stage 4, GFR 15-29 ml/min 04/20/2016    US Retro   5/16/2018---Mild cortical thinning and increased cortical echogenicity.  Findings can be seen with medical renal disease.  8/31/216--- Echogenic kidneys with diffuse cortical thinning suggesting  medical renal disease. 2 complex right renal cortical cysts.      Closed nondisplaced fracture of distal phalanx of left great toe with routine healing 10/22/2018    Coronary artery disease 1993    heart transplant    COVID-19 in immunocompromised patient 02/26/2024    Cystitis 05/10/2022    Depression     Encounter for blood transfusion     Encounter for palliative care 10/14/2024    Fibromyalgia     on Lyrica    Heart failure     native heart cardiomyopathy    Heart transplanted 1993    due to cardiomyopathy    History of hyperparathyroidism; Hyperparathyroidism, secondary renal     PT DENIES    Hypertension      Immune disorder     anti rejection meds    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    Impaired mobility 07/28/2022    Iron deficiency anemia 08/15/2017    Kidney stones     passed per pt    Obesity     Obesity (BMI 30.0-34.9) 07/22/2019    Other osteoporosis without current pathological fracture 08/30/2019    Other pancytopenia 05/06/2024    Parotitis, acute 09/19/2023    Pharyngitis 01/09/2019    Pneumonia due to infectious organism 03/14/2024    PONV (postoperative nausea and vomiting)     Severe sepsis 11/22/2021    Shingles 2003 approx    left leg    Stage 4 chronic kidney disease 11/22/2021    Subclinical hypothyroidism 06/16/2023    Thrombocytopenia, unspecified 11/29/2021    Trouble in sleeping     Unresponsiveness 11/13/2024    Urinary incontinence      Past Surgical History:   Procedure Laterality Date    bladder implant Right 06/11/2024    BLADDER SURGERY  2015 approx    mesh - Dr Everett then 2nd reconstructive sx Dr Onofre    BREAST BIOPSY Bilateral     NEGATIVE    BREAST BIOPSY Right 10/31/2022    benign    BREAST LUMPECTOMY Left 2021    BREAST SURGERY Left 09/28/2015    Bx - benign    BREAST SURGERY Right 12/2015    Bx benign    CARDIAC PACEMAKER REMOVAL Left 06/26/2014    Pacer defirillator removed. Put in 1993 aat time of heart transplant    CARPAL TUNNEL RELEASE Left 03/03/2015    Dr. Hall    COLONOSCOPY N/A 02/25/2021    Procedure: COLONOSCOPY;  Surgeon: Freida Ramirez MD;  Location: [facility];  Service: Endoscopy;  Laterality: N/A;    CYSTOCELE REPAIR      Twice with mesh removal    EPIDURAL STEROID INJECTION INTO CERVICAL SPINE N/A 02/02/2023    Procedure: T11/T12 IL HELLEN;  Surgeon: Jassi Pierre MD;  Location: [facility];  Service: Pain Management;  Laterality: N/A;    EPIDURAL STEROID INJECTION INTO CERVICAL SPINE N/A 9/16/2024    Procedure: T11/T12 IL HELLEN;  Surgeon: Jassi Pierre MD;  Location: [facility];  Service: Pain Management;  Laterality: N/A;     HEART TRANSPLANT  1993    HERNIA REPAIR Right 1971 approx    Inguinal    HYSTERECTOMY  1983    vag hyst /LSO     IMPLANTATION OF PERMANENT SACRAL NERVE STIMULATOR N/A 06/11/2024    Procedure: INSERTION, NEUROSTIMULATOR, PERMANENT, SACRAL;  Surgeon: Sami Cochran MD;  Location: [facility] OR;  Service: Urology;  Laterality: N/A;    INCISION AND DRAINAGE OF ABSCESS Left 12/24/2021    Procedure: INCISION AND DRAINAGE, ABSCESS;  Surgeon: Joseph Longo MD;  Location: [facility] OR;  Service: General;  Laterality: Left;    INJECTION OF ANESTHETIC AGENT AROUND MEDIAL BRANCH NERVES INNERVATING LUMBAR FACET JOINT Right 10/19/2022    Procedure: Right L4/L5 and L5/S1 MBB;  Surgeon: Jassi Pierre MD;  Location: [facility]IN [facility]GT;  Service: Pain Management;  Laterality: Right;    INJECTION OF ANESTHETIC AGENT AROUND MEDIAL BRANCH NERVES INNERVATING LUMBAR FACET JOINT Right 11/09/2022    Procedure: Right L4/L5 and L5/S1 MBB;  Surgeon: Jassi Pierre MD;  Location: [facility];  Service: Pain Management;  Laterality: Right;    INJECTION OF ANESTHETIC AGENT AROUND MEDIAL BRANCH NERVES INNERVATING LUMBAR FACET JOINT Left 2/6/2025    Procedure: Left L4-5 and L5-S1 MBB #1 with local;  Surgeon: Jassi Pierre MD;  Location: [facility];  Service: Pain Management;  Laterality: Left;    INJECTION OF ANESTHETIC AGENT INTO SACROILIAC JOINT Right 08/22/2022    Procedure: Right SIJ Injection Right L5/S1 Facte Injection;  Surgeon: Jassi Pierre MD;  Location: [facility];  Service: Pain Management;  Laterality: Right;    INSERTION OF TUNNELED CENTRAL VENOUS CATHETER (CVC) WITH SUBCUTANEOUS PORT N/A 11/09/2021    Procedure: [id number];  Surgeon: Christoph Douglas MD;  Location: [facility] OR;  Service: General;  Laterality: N/A;    RADIOFREQUENCY ABLATION Right 12/5/2024    Procedure: Right L4-5 and L5-S1 RFA;  Surgeon: Jassi Pierre MD;  Location: [facility];  Service: Pain Management;  Laterality: Right;     "RADIOFREQUENCY THERMOCOAGULATION Right 12/07/2022    Procedure: Right L4/L5 and L5/S1 Lumbar RFA;  Surgeon: Jassi Pierre MD;  Location: [facility];  Service: Pain Management;  Laterality: Right;    REMOVAL OF ELECTRODE LEAD OF SACRAL NERVE STIMULATOR N/A 2/18/2025    Procedure: REMOVAL, ELECTRODE LEAD, SACRAL NERVE STIMULATOR;  Surgeon: Sami Cochran MD;  Location: [facility] OR;  Service: Urology;  Laterality: N/A;    REMOVAL OF VASCULAR ACCESS PORT      ROBOT-ASSISTED LAPAROSCOPIC ABDOMINAL SACROCOLPOPEXY N/A 08/10/2023    Procedure: ROBOTIC SACROCOLPOPEXY, ABDOMEN;  Surgeon: PRANAY Villalobos MD;  Location: [facility] OR;  Service: OB/GYN;  Laterality: N/A;    ROBOT-ASSISTED LAPAROSCOPIC OOPHORECTOMY Right 08/10/2023    Procedure: ROBOTIC OOPHORECTOMY;  Surgeon: PRANAY Villalobos MD;  Location: [facility] OR;  Service: OB/GYN;  Laterality: Right;    SENTINEL LYMPH NODE BIOPSY Left 10/12/2021    Procedure: BIOPSY, LYMPH NODE, SENTINEL;  Surgeon: Christoph Douglas MD;  Location: [facility] OR;  Service: General;  Laterality: Left;    TOE SURGERY      XI ROBOTIC URETHROPEXY N/A 08/10/2023    Procedure: XI ROBOTIC URETHROPEXY;  Surgeon: PRANAY Villalobos MD;  Location: [facility] OR;  Service: OB/GYN;  Laterality: N/A;     Review of patient's allergies indicates:   Allergen Reactions    Dobutamine Other (See Comments)     Severe Left sided chest pain after dosage administered for Dobutamine Stress Test; itching    Lisinopril Swelling and Rash    Hydrocodone-acetaminophen Nausea Only    Augmentin [amoxicillin-pot clavulanate] Diarrhea    Zyvox [linezolid] Nausea And Vomiting        Medications Ordered Prior to Encounter[1]     PMHx, PSHx, Allergies, Medications reviewed in epic    ROS negative except pain complaints in HPI    OBJECTIVE:    /87 (BP Location: Right arm, Patient Position: Sitting)   Pulse (!) 52   Temp 97.7 °F (36.5 °C)   Resp 16   Ht 5' 2" (1.575 m)   Wt 66.3 kg (146 lb 4.4 oz)   LMP 06/20/1983 " (Within Years)   SpO2 97%   Breastfeeding No   BMI 26.75 kg/m²     PHYSICAL EXAMINATION:    GENERAL: Well appearing, in no acute distress, alert and oriented x3.  PSYCH:  Mood and affect appropriate.  SKIN: Skin color, texture, turgor normal, no rashes or lesions which will impact the procedure.  CV: RRR with palpation of the radial artery.  PULM: No evidence of respiratory difficulty, symmetric chest rise. Clear to auscultation.  NEURO: Cranial nerves grossly intact.    Plan:    Proceed with procedure as planned Procedure(s) (LRB):  Left L4-5 and L5-S1 MBB #2 with local (Left)    Jassi Pierre MD  03/19/2025                 [1]   No current facility-administered medications on file prior to encounter.     Current Outpatient Medications on File Prior to Encounter   Medication Sig Dispense Refill    NIFEdipine (PROCARDIA-XL) 60 MG (OSM) 24 hr tablet Take 1 tablet (60 mg total) by mouth once daily. 30 tablet 11    tiZANidine (ZANAFLEX) 2 MG tablet Take 2 tablets (4 mg total) by mouth 2 (two) times a day. 120 tablet 5    zolpidem (AMBIEN) 5 MG Tab Take 1 tablet (5 mg total) by mouth every evening. 30 tablet 5    aspirin (ECOTRIN) 81 MG EC tablet Take 1 tablet (81 mg total) by mouth once daily.      calcitRIOL (ROCALTROL) 0.25 MCG Cap Take 1 capsule (0.25 mcg total) by mouth once daily. 30 capsule 11    furosemide (LASIX) 40 MG tablet Take 1 tablet (40 mg total) by mouth once daily. 30 tablet 11    LIDOcaine (LIDODERM) 5 % Place 1 patch onto the skin once daily. Remove & Discard patch within 12 hours or as directed by MD 30 patch 2    mupirocin (BACTROBAN) 2 % ointment Apply topically 2 (two) times daily. 22 g 0    nitroGLYCERIN (NITROSTAT) 0.4 MG SL tablet PLACE 1 TABLET UNDER THE TONGUE EVERY 5 MINS AS NEEDED FOR CHEST PAIN FOR UP TO 3 DOSES.IF CONTINUED HEART PAIN/PRESSURE AFTER 100 tablet 0    oxyCODONE-acetaminophen (PERCOCET) 5-325 mg per tablet Take 1 tablet by mouth every 4 (four) hours as needed for  Pain. 25 tablet 0    patiromer calcium sorbitex (VELTASSA) 8.4 gram PwPk Take 1 packet (8.4 g total) by mouth once daily 30 packet 6    patiromer calcium sorbitex (VELTASSA) 8.4 gram PwPk Take 1 packet (8.4 g total) by mouth once daily. 90 packet 0    polyethylene glycol (GLYCOLAX) 17 gram PwPk Take 17 g by mouth once daily.      sodium bicarbonate 650 MG tablet Take 1 tablet (650 mg total) by mouth 2 (two) times daily. 60 tablet 1    vitamin E 400 UNIT capsule Take 400 Units by mouth once daily.

## 2025-03-20 ENCOUNTER — TELEPHONE (OUTPATIENT)
Dept: PAIN MEDICINE | Facility: CLINIC | Age: 70
End: 2025-03-20
Payer: MEDICARE

## 2025-03-20 NOTE — TELEPHONE ENCOUNTER
Brittany Damon,    At your earliest convenience, please answer the following questions from your procedure. Please feel free to add any additional information if needed.     Regarding recent procedure: Lumbar MBB #2 on 3/19/25    1. How much better did you feel day of procedure (0-6 hours immediately after)? 100% = complete relief of pain; 0% = no pain relief at all. Pt reports receiving 90% pain relief    2. What was pain score the day before your procedure on a scale from 0-10? 10/10    3. What was pain score immediately after your procedure (up to 6 hours) on a scale from 0-10? 3/10     4. When your pain returned, what was your pain score on a scale from 0-10? 1/10         Pain Disability Index (PDI) Score Review:      3/7/2025     9:45 AM 3/4/2025     1:17 PM 1/8/2025    10:43 AM   Last 3 PDI Scores   Pain Disability Index (PDI) 70 54 42

## 2025-03-21 NOTE — TELEPHONE ENCOUNTER
Patient reports 90% relief for 12 hours after left L4-5 and L5-S1 medial branch block.  We will proceed with RFA as this is patient's 2nd diagnostic medial branch block.

## 2025-03-24 ENCOUNTER — TELEPHONE (OUTPATIENT)
Dept: PALLIATIVE MEDICINE | Facility: CLINIC | Age: 70
End: 2025-03-24
Payer: MEDICARE

## 2025-03-24 ENCOUNTER — TELEPHONE (OUTPATIENT)
Dept: NEPHROLOGY | Facility: CLINIC | Age: 70
End: 2025-03-24
Payer: MEDICARE

## 2025-03-24 NOTE — TELEPHONE ENCOUNTER
----- Message from Geri sent at 3/24/2025  9:52 AM CDT -----  Contact: pt's friend/ Sandee Butler  Type: Request a call backName of Caller: pt's friend/Sandee Zaldivar Call Back Number:   [phone] (Sandee's phone #) (please leave msg if no answer)Additional Information: Sandee is calling and need the nurse to call her about the pt's needing help for upcoming procedures. And also need to talk about pt is on strict diet for her kidney's and usually prepared for the pt.(But now need to pay for these meals)  ----- Message -----  From: Geri Benites  Sent: 3/24/2025   9:56 AM CDT  To: Randolph Potter    Type: Request a call backName of Caller: pt's friend/Sandee Zaldivar Call Back Number:   [phone] (Sandee's phone #)Additional Information: Sandee is calling and need the nurse to call her about the pt's needing help for upcoming procedures. And also need to talk about pt is on strict diet for her kidney's and usually prepared for the pt.(But now need to pay for these meals)

## 2025-03-24 NOTE — TELEPHONE ENCOUNTER
----- Message from Summer sent at 3/24/2025  2:41 PM CDT -----  Contact: Nadia  Type:   Reschedule Appointment RequestCaller is requesting to reschedule missed/canceled/unavailable appointment. Name of Caller: Nadia When was appointment scheduled? 3/24Reason for visit: Labs Would the patient rather a call back or a response via [facility]? Call back Best Call Back Number:[phone] Information: She wants the labs done tomorrow. I was unable to reschedule urine.

## 2025-03-25 ENCOUNTER — LAB VISIT (OUTPATIENT)
Dept: LAB | Facility: HOSPITAL | Age: 70
End: 2025-03-25
Attending: INTERNAL MEDICINE
Payer: MEDICARE

## 2025-03-25 DIAGNOSIS — I10 PRIMARY HYPERTENSION: ICD-10-CM

## 2025-03-25 DIAGNOSIS — N18.5 CKD (CHRONIC KIDNEY DISEASE), SYMPTOM MANAGEMENT ONLY, STAGE 5: ICD-10-CM

## 2025-03-25 DIAGNOSIS — R80.1 PERSISTENT PROTEINURIA: ICD-10-CM

## 2025-03-25 LAB
25(OH)D3+25(OH)D2 SERPL-MCNC: 50 NG/ML (ref 30–96)
ALBUMIN SERPL BCP-MCNC: 3.6 G/DL (ref 3.5–5.2)
ANION GAP (OHS): 16 MMOL/L (ref 8–16)
BUN SERPL-MCNC: 38 MG/DL (ref 8–23)
CALCIUM SERPL-MCNC: 9.3 MG/DL (ref 8.7–10.5)
CHLORIDE SERPL-SCNC: 104 MMOL/L (ref 95–110)
CO2 SERPL-SCNC: 20 MMOL/L (ref 23–29)
CREAT SERPL-MCNC: 3.2 MG/DL (ref 0.5–1.4)
GFR SERPLBLD CREATININE-BSD FMLA CKD-EPI: 15 ML/MIN/1.73/M2
GLUCOSE SERPL-MCNC: 76 MG/DL (ref 70–110)
PHOSPHATE SERPL-MCNC: 4 MG/DL (ref 2.7–4.5)
POTASSIUM SERPL-SCNC: 4.1 MMOL/L (ref 3.5–5.1)
PTH-INTACT SERPL-MCNC: 188.3 PG/ML (ref 9–77)
SODIUM SERPL-SCNC: 140 MMOL/L (ref 136–145)

## 2025-03-25 PROCEDURE — 36415 COLL VENOUS BLD VENIPUNCTURE: CPT | Mod: HCNC

## 2025-03-25 PROCEDURE — 83970 ASSAY OF PARATHORMONE: CPT | Mod: HCNC

## 2025-03-25 PROCEDURE — 82306 VITAMIN D 25 HYDROXY: CPT | Mod: HCNC

## 2025-03-25 PROCEDURE — 80069 RENAL FUNCTION PANEL: CPT | Mod: HCNC

## 2025-03-26 ENCOUNTER — INFUSION (OUTPATIENT)
Dept: INFUSION THERAPY | Facility: HOSPITAL | Age: 70
End: 2025-03-26
Attending: INTERNAL MEDICINE
Payer: MEDICARE

## 2025-03-26 ENCOUNTER — CLINICAL SUPPORT (OUTPATIENT)
Facility: HOSPITAL | Age: 70
End: 2025-03-26
Payer: MEDICARE

## 2025-03-26 VITALS
OXYGEN SATURATION: 99 % | DIASTOLIC BLOOD PRESSURE: 87 MMHG | BODY MASS INDEX: 26.75 KG/M2 | TEMPERATURE: 98 F | HEART RATE: 90 BPM | WEIGHT: 146.25 LBS | RESPIRATION RATE: 16 BRPM | SYSTOLIC BLOOD PRESSURE: 147 MMHG

## 2025-03-26 DIAGNOSIS — M81.8 OTHER OSTEOPOROSIS WITHOUT CURRENT PATHOLOGICAL FRACTURE: Primary | ICD-10-CM

## 2025-03-26 DIAGNOSIS — M54.41 BILATERAL LOW BACK PAIN WITH BILATERAL SCIATICA, UNSPECIFIED CHRONICITY: ICD-10-CM

## 2025-03-26 DIAGNOSIS — M54.59 OTHER LOW BACK PAIN: Primary | ICD-10-CM

## 2025-03-26 DIAGNOSIS — M54.42 BILATERAL LOW BACK PAIN WITH BILATERAL SCIATICA, UNSPECIFIED CHRONICITY: ICD-10-CM

## 2025-03-26 DIAGNOSIS — D63.1 ANEMIA ASSOCIATED WITH STAGE 5 CHRONIC RENAL FAILURE: ICD-10-CM

## 2025-03-26 DIAGNOSIS — N18.5 ANEMIA ASSOCIATED WITH STAGE 5 CHRONIC RENAL FAILURE: ICD-10-CM

## 2025-03-26 PROCEDURE — 96372 THER/PROPH/DIAG INJ SC/IM: CPT | Mod: HCNC

## 2025-03-26 PROCEDURE — 97810 ACUP 1/> WO ESTIM 1ST 15 MIN: CPT | Mod: HCNC | Performed by: ACUPUNCTURIST

## 2025-03-26 PROCEDURE — 63600175 PHARM REV CODE 636 W HCPCS: Mod: JZ,EC,TB,HCNC | Performed by: NURSE PRACTITIONER

## 2025-03-26 PROCEDURE — 97811 ACUP 1/> W/O ESTIM EA ADD 15: CPT | Mod: HCNC | Performed by: ACUPUNCTURIST

## 2025-03-26 RX ADMIN — EPOETIN ALFA-EPBX 20000 UNITS: 20000 INJECTION, SOLUTION INTRAVENOUS; SUBCUTANEOUS at 01:03

## 2025-03-26 NOTE — PROGRESS NOTES
Acupuncture Evaluation Note     Name: Nadia Damon  Clinic Number: [id number]    Traditional Chinese Medicine (TCM) Diagnosis: Qi Stagnation and Blood Stasis  Medical Diagnosis:   Encounter Diagnoses   Name Primary?    Bilateral low back pain with bilateral sciatica, unspecified chronicity     Other low back pain Yes        Evaluation Date: 3/26/2025    Visit #/Visits authorized: 1/12    Precautions: Standard and organ transplant    Subjective     Chief Concern:   Pain management  (R) side sciatica  (R) leg numbness  Stroke - (R) arm and (R) leg  go numb and are weak , Drives with left foot - 8 months ago   Pain in hips/low back     Has had injections at T11/12  Nerve blocks on (L) side  Nerves burned on (R) side - still fels residual pain above the area with bending, burning,     Smooth move tea - helps with bowel movements, seems to work better than Miralax     LV and KI issues  Osteoporosis      Lives at home alone   Ultrasound showed no blockage in (R) leg  Feet hurt, hard to walk    Injections - for hemoglobin level -   Anti rejection drugs for heart transplant -   Can't change meds because of Kidneys     Poor sleep - up at 2-3 AM, she'll bake a night.   When she's in pain she will do something - baking, reading, puzzles  Sits in show/tub, has grab bars, pull lights where she lives -     Just had injections in low back   Sleeps in a recliner     Had an inner stim removed - that was for bladder, but didn't help     Took her off Cymbalta    Has had dry needling estim- that was too much for her, made her jumpy all day     Ambien for 30 years -       Medical necessity is demonstrated by the following IMPAIRMENTS: Medical Necessity: Decreased mobility limits day to day activities, social, and emergent situations and Decreased quality of life              Aggravating Factors:  movement, lying down, standing, and changing positions   Relieving Factors:  heat    Symptom Description:     Quality:  Aching, Burning,  Sharp, and Electric  Severity:  8  Frequency:  continuously    Previous Treatments Tried:  Injection(s), Medication, Imaging, and Therapy     HEENT:      Chest:      Digestion:     Diet:    Fluids: , ,   Taste/Appetite:    Symptoms:     Sleep: poor    Energy Levels:  see notes    Psychological Symptoms:  see notes    Other Symptoms:     GYN Symptoms:     Objective     Observation: walks with a cane    Tongue:      Body:     Color:     Coating:      Pulse:         New Findings:      Treatment     Treatment Principles:  move qi and blood stasis  *face down treatment!*    Acupuncture points used:      Bilateral points: GB29/30x5 bi lateral, UB23,32, UB20, UB40,57  Unilateral points:  Auricular Treatment:      Needles In: 20  Needles Out: 20  Needles W/O STIM placed: 2:30  Needles W/O STIM removed: 3:00      Other Traditional Chinese Medicine Modalities -  heat lamp    Assessment     After treatment, patient felt okay     Patient prognosis is Good.     Patient will continue to benefit from acupuncture treatment to address the deficits listed in the problem list box on initial evaluation, provide patient family education and to maximize pt's level of independence in the home and community environment.     Patient's spiritual, cultural and educational needs considered and pt agreeable to plan of care and goals.     Anticipated barriers to treatment: none    Plan     Recommend 1 /week for 6 sessions then re-assess.      Education:  Patient is aware of cumulative benefit of acupuncture

## 2025-03-26 NOTE — DISCHARGE INSTRUCTIONS
[facility]  [address] [facility]  [address]  [phone] phone     [phone] fax  Hours of Operation: Monday- Friday 8:00am- 5:00pm  After hours phone  [phone]  Hematology / Oncology Physicians on call      SALENA Thurman Dr., NP Phaon Dunbar, NP Khelsea Conley, FNP    Please call with any concerns regarding your appointment today.

## 2025-03-27 ENCOUNTER — TELEPHONE (OUTPATIENT)
Dept: PALLIATIVE MEDICINE | Facility: CLINIC | Age: 70
End: 2025-03-27
Payer: MEDICARE

## 2025-03-27 NOTE — TELEPHONE ENCOUNTER
Nurse returned call to pt, she is requesting Luz, palliative provider to call her to see if she has any resources to help her with in home ADLs. Pt stated she will soon be starting dialysis and would like to get help in her home. Pt understands provider is out of office until Monday and will received this message once she returns, pt voiced understanding and agreed.

## 2025-03-27 NOTE — TELEPHONE ENCOUNTER
----- Message from Anjelica sent at 3/27/2025  2:10 PM CDT -----  Contact: Sandee/ Friend  Type:  Patient Returning CallWho Called:Phyllis Lynn Message for Patient:Shanique the patient know what this is regarding?:missed callWould the patient rather a call back or a response via [facility]? Call [facility] Call Back Number:[phone] Information: Sandee would like a call back to discuss the care of the patientRavinder

## 2025-03-27 NOTE — TELEPHONE ENCOUNTER
----- Message from Lauren sent at 3/27/2025  4:13 PM CDT -----  Type:  Patient Returning Call Who Called:PtWho Left Message for Patient:Jeramy the patient know what this is regarding?:Pt returning missed callWould the patient rather a call back or a response via [facility]? CALLBest Call Back Number:Telephone Information:Mobile          [phone] Information: Please advise thank you

## 2025-03-28 ENCOUNTER — DOCUMENTATION ONLY (OUTPATIENT)
Dept: PSYCHIATRY | Facility: HOSPITAL | Age: 70
End: 2025-03-28
Payer: MEDICARE

## 2025-03-28 ENCOUNTER — CLINICAL SUPPORT (OUTPATIENT)
Dept: PRIMARY CARE CLINIC | Facility: CLINIC | Age: 70
End: 2025-03-28
Payer: MEDICARE

## 2025-03-28 DIAGNOSIS — F60.5 OBSESSIVE COMPULSIVE PERSONALITY DISORDER: Primary | ICD-10-CM

## 2025-03-28 NOTE — PROGRESS NOTES
Patient discussed during [facility] meeting today, 03/28/2025.  Pt with OCPD, does not meet ritualistic criteria for OCD.  S/p heart transplant, among other serious chronic illnesses.  Was taking duloxetine with good effect, but her renal status is tenuous.  Recommend low dose sertraline, 25 mg daily, with slow up titration as needed.    Time: 20 min    The above treatment considerations and suggestions are based on consultations with the patients Behavioral Health Integration therapist and a review of information available in the patient's chart.  I have not personally examined the patient.  All recommendations should be implemented with consideration of the patients relevant prior history and current clinical status.  It remains the responsibility of the patient's Primary Care Physician to prescribe medications and to educate the patient on risks versus benefits, alternative treatments, side effect profile and the inherent unpredictability of individual responses to medications.  Please feel free to call me with any questions about the care of this patient.

## 2025-03-28 NOTE — PROGRESS NOTES
DATE:  3/28/2025  REFERRAL SOURCE:  Elis Wick MD  TYPE OF VISIT:  In person  LENGTH OF SESSION: 60  .  HISTORY OF PRESENTING ILLNESS:  Nadia Damon, a 69 y.o. female with history of Anxiety disorders; anxiety, unspecified [F41.9], Major Depressive Disorder, Recurrent, Moderate (F33.1), and Persistent insomnia with other symptoms [G47.00].       CHIEF COMPLAINT/REASON FOR ENCOUNTER: Pt's chief complaint includes the following:  sleep, and grief.    PSYCHIATRIC HISTORY:  Patient does not currently have a psychiatrist.    Patient does not currently have a therapist.     Pt is taking temazepam (Restoril) 30mg once daily and zolpidem (Ambien) 0.5mg once daily for sleep.  They are not interested in medication changes.  Previous Psychiatric Hospitalizations:  No  History of Trauma:  Heart transplant; CVA.    History of Violence:  No  Access to a gun/weapon:  No  Previous Suicide Attempts:  No    Risk assessment:  Patient reports no suicidal ideation  Patient reports no homicidal ideation  Patient reports no self-injurious behavior  Patient reports no violent behavior    PSYCHIATRIC FAMILY HISTORY:  Parents were alcoholic; SonMoy is an alcoholic.  Grandson has hx of depression      SOCIAL HISTORY (MARRIAGE, EMPLOYMENT, etc.):  Living Situation: Alone, at [facility].   Relationship status Single; never .   Children/Family: 2 sons. 1 sonFlakito, is .  SonMoy Jr lives in [place] with wife.  Estranged granddtrQiana.  Estranged grandsonMoy.  Cousin.   Supports:Denominational friends.   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  Adventism/Spirituality: Hindu - Living [employer]. Volunteers as a .   Hobbies and Interests: Crafting.     Current social stressors:   CVA 2024.  Loss of driving.CKD 4. May need dialysis soon.  Hx of heart transplant in .  Transplanted heart now at [facility].   Grandson's arrest in 2024; news  "coverage of his arrest.    Death of son, Flakito.  Years ago. Loss of contact with Flakito's daughter, Qiana (or Alda Kiran).       Current symptoms:  Depression: decreased appetite.  Anxiety: denies.  Insomnia:  chronic insomnia. Years of dealing with this.  .  Astrid:  pressured speech.  Psychosis: denies .    MENTAL HEALTH STATUS EXAM  General Appearance:  unremarkable, age appropriate   Speech: normal pitch, normal volume, pressured, rapid      Level of Cooperation: cooperative      Thought Processes: normal and logical   Mood: steady      Thought Content: normal, no suicidality, no homicidality, delusions, or paranoia   Affect: congruent and appropriate   Orientation: Oriented x3   Memory: Appears WNL; patient not tested.    Attention Span & Concentration: intact   Fund of General Knowledge: intact and appropriate to age and level of education   Judgment & Insight: good   Language  intact     Session Content/Presenting Problem Hx:  November 2024: PHQ 13; LUPE 15  February 2025 PHQ/LUPE: 16/16  Goal is to resume her active life.   From initial assessment: She says "I go up up up then will cry half the day." Patient describes herself as someone who will try to push through any obstacles; she tries not to let things get her down. Ends up feeling overwhelmed. Fear of being judged by others.      7th session. Had acupuncture this week.  To have more sessions. Enjoyed it very much. Pain is better. Pending another procedure with Dr. Pierre to "cut some nerves."   Expresses having a good rapport with Dr. Pierre.   Has not slept. Cleaned house throughout the night. Was cleaning kitchen, then the floor, then the line in toilet drove her nuts, sat on stool to clean the toilet. Using denture tablets to clean out the toilet. Sits in bathtub to clean all around the rim.   Wants to apply for lt-pcs. She called and they told her that she needs a referral from a doctor. She also plans to speak with Jatinder Palomares at [place] " on aging for possible assistance.  Discussed the process for applying for lt-pcs or waiver services.  Advised patient she may not qualify for too much due to her apparent independence in cleaning and cooking for herself.  Will assist patient in locating possible helpful resources.  She worries about being a burden on friends or family.   She is aware that at times she cannot stop herself from talking. Was on a calming medicine at one time that she found very helpful; it was shortly after her heart transplant.  As she describes how the medicine allowed herself to become calm and restful patient begins to relax, she leans back and rests her head.  Patient is encouraged to continue to relax herself and to focus on deep slow breathing and focused relaxation. Patient expresses feeling very good after the practice.  She is encouraged to continue to do this at home.   Discussed the recurrent thoughts telling her to do one more thing and then one more thing.  She will use thought stopping techniques to interrupt the recurrent thoughts and will work on maintaining a relaxed resting state.   She is encouraged to sleep, whether daytime or nighttime, whenever she feels sleepy.  Will use [place] aleksandar to help calm and quiet herself enough to rest.       6th session.  Patient had to cancel prior session due to having fallen again.  She again appears to be in pain; she walks slowly and stiffly, using a cane.   Patient engages in talking about her 5 yo great granddaughter, Angel, who likes to play doctor for Nadia.  Nadia bought her a cute play doctor's outfit; shows pictures.   Patient spends time today discussing ongoing family stressors. She becomes tearful and sad while talking about her son Moy's alcoholism, his refusal to attend AA meetings, and his frequent belligerence.  She describes Moy's wife calling her often to express her own worry and stress.They are living apart but his wife is still very much involved in  "Moy's life.    Educated patient on alcoholism and codependency. Patient acknowledges the futility of trying to change her son; she recognizes that the harder she tries to change him the harder he pushes back. Also explored the lack of maturity often present in someone with a substance abuse disorder. Patient is encouraged to establish boundaries and to protect her own sense of wellbeing. Patient reports having told Moy she does not want him to curse when talking to her.  She will consider using boundaries to limit her daughter in law's "dumping" of emotion onto her.  Patient expresses sadness as she considers how difficult it is for her to communicate with her son. Encouraged her to consider Moy as emotionally immature due to his years of relying on alcohol to cope with life stressors. Patient expresses understanding of this concept.  Discussed the concept of anxiety and avoidance with patient.  Explored how her desire to avoid anxious thoughts may be encouraging her perfectionism and her need to clean and organize.  Patient describes feeling overwhelmed by worries; she then becomes distracted by something that needs to be cleaned or organized or decorated.   Patient becomes tearful as she agrees that she is exhausted by this cycle of anxiety and avoidance.  She mentions the death of her son, Flakito, at age 30; she continues to grieve that loss.  Patient is supported as she talks of his death.    Discussed the concept of becoming aware of our thoughts and their connection to our actions. Explored the practice of replacing negative thoughts with more positive thoughts.  Patient expresses a fear of her son Moy also dying; she says she would then have no one to support her. She begins a litany of "no mom, no dad, no brother, no sister, etc." Asked patient if that is a true statement. Patient admits that she actually relies on her granddaughter to be supportive and helpful when needed; she has named her " "as her emergency contact.  Discussed with patient how using thought stopping and replacing automatic negative thoughts with more positive ones can help her mood. Used "Devil and Bryce on each shoulder" to explain the opposing thoughts.  Because patient's beth plays an important role in her life she is encouraged to acknowledge that her beth tells her that God puts people in her life to help her and she should not refuse them.  Patient understands and agrees with these sentiments.   Practiced with patient saying Yes instead of No, through a role play exercise.  Patient has difficulty saying Yes when offered assistance, even in a role playing scenario.  She agrees to try to say Yes to the next person that offers to assist her.  Patient is encouraged to review the anxiety-avoidance handout. She is also encouraged to begin a daily practice of writing down her fears and worries as a way of releasing some of that negative energy.  Discussed with patient the possibility of her starting on an antidepressant in the future; reassured her that the doctor is exploring options for her given her complicated health status.      Prior Session:   5th session. Discussed in more detail with patient how her days go regarding her housework and her need to get things done. Patient describes needing things to be just right.  Cannot stand disorder.  Must dust every day. Must say a long list of prayers every day.  Patient habitually tells people no when they offer to help her in anyway.  She pushes herself to exhaustion despite being in physical pain.   Patient describes her thoughts as: "If I don't do these things (clean, decorate) then I will hurt even more. I have to keep going and doing because if I sit I will hurt more. "  She is aware that she has hurt herself because her mind kept telling her to keep going even when she knew she was exhausted or hurting or just not safe.  This is how she has fallen on numerous occasions.   Patient " "says she deliberately will not take pain medicine because she does not want to feel sleepy and tired; she does not want the medicine to help her rest.   Patient describes friends and family continually asking her to please stop and to let them help.  They encourage her to sit and to rest.  Patient is aware that by saying no she is pushing people away.   She says "I want to stop doing this, I want to let others help me, I want to rest, I am so tired."  Patient describes fighting off sleep; always thinking of something else she can do.  She does not sleep in her bed.  She sleeps in a chair.  Says she has tried a few times to sleep in her bed but she cannot relax with trying to keep the covers perfect; she will try to sleep on "an inch" of the bed.   Patient discusses her time at rehab last year after her stroke. Even then she would not stop and rest despite the staff urging her to lie down.  She spent time cleaning the room and the bathroom. Patient describes an overwhelming need to have order.    Patient expresses a desire to slow down and relax but also expresses feeling unable to do that.    Empathy and support are provided as Nadia described her pattern of anxious thoughts and compulsive behaviors.  Focus is on building a level of trust with Nadia through active listening, unconditional positive regard and acceptance.   Nadia appears to show symptoms of OCPD: preoccupation with order and details; perfectionism; devotion to activity and productivity; fear of being perceived as a failure; refusing to work with others or to delegate tasks; and becoming overly fixated on a task.  Her inability to change her behaviors or to accept help from others has negatively affected her relationships with her family and her friends.  It has also affected her physical health.   Patient agrees that LCSW may discuss her symptoms with the MD and the Psych PA for possible medication recommendations. She notes that she was previously on " Cymbalta but that was discontinued.      STRENGTHS AND LIABILITIES: Strength: Patient is expressive/articulate., Strength: Patient is motivated for change., Strength: Patient has positive support network.    IMPRESSION:   My diagnostic impression is Anxiety disorders; anxiety, unspecified [F41.9], MAJOR DEPRESSIVE DISORDER, SINGLE EPISODE, Moderate (F32.1), and uncomp bereavement, as evidenced by patient's description of increased feelings of sadness and fear related to recent hospitalization; feeling down at times, tearfulness, sadness from loss of son, grandson, granddaughter, family stressors, health stressors. She does not sleep well.     TREATMENT GOALS: Anxiety: reducing negative automatic thoughts, reducing physical symptoms of anxiety, and reducing time spent worrying (<30 minutes/day)  Depression: increasing self-reward for positive behaviors (one/day), increasing self-reward for positive thoughts (one/day), and reducing fatigue  Grief: process grief related to son's death, estrangement from a grandson and a granddaughter.     PLAN: In this session a psychosocial evaluation was conducted to get history and determine a treatment plan. CBT will be utilized in future individual  therapy sessions to increase interaction, insight, support, and behavior modification.     NEXT APPOINTMENT:  4/8/25

## 2025-03-31 ENCOUNTER — TELEPHONE (OUTPATIENT)
Dept: NEPHROLOGY | Facility: CLINIC | Age: 70
End: 2025-03-31
Payer: MEDICARE

## 2025-03-31 NOTE — TELEPHONE ENCOUNTER
----- Message from [employer] sent at 3/31/2025  7:17 AM CDT -----  Contact: self  ..Type:  Same Day Appointment RequestCaller is requesting a same day appointment.  Caller declined first available appointment listed below.  Name of Caller:.Nadia DamonKristiearely is the first available appointment? Today Symptoms: Best Call Back Number:.[phone] (home) Additional Information: pt is asking for an return call in reference to seeing there is an later apt time other 8:30 on today due to the weather pt states she would like an later apt time, next avaiable apt was Thursday 04/03.

## 2025-03-31 NOTE — TELEPHONE ENCOUNTER
Patient called to reschedule appointment for 3/31/2025 due to the weather. Patients appointment was rescheduled . Patient also agreed and accepted new appointment date and time

## 2025-04-02 ENCOUNTER — CLINICAL SUPPORT (OUTPATIENT)
Facility: HOSPITAL | Age: 70
End: 2025-04-02
Payer: MEDICARE

## 2025-04-02 ENCOUNTER — OFFICE VISIT (OUTPATIENT)
Dept: NEPHROLOGY | Facility: CLINIC | Age: 70
End: 2025-04-02
Payer: MEDICARE

## 2025-04-02 VITALS
HEART RATE: 100 BPM | WEIGHT: 143.31 LBS | SYSTOLIC BLOOD PRESSURE: 160 MMHG | DIASTOLIC BLOOD PRESSURE: 90 MMHG | HEIGHT: 62 IN | BODY MASS INDEX: 26.37 KG/M2

## 2025-04-02 DIAGNOSIS — M54.59 OTHER LOW BACK PAIN: ICD-10-CM

## 2025-04-02 DIAGNOSIS — M54.41 BILATERAL LOW BACK PAIN WITH BILATERAL SCIATICA, UNSPECIFIED CHRONICITY: Primary | ICD-10-CM

## 2025-04-02 DIAGNOSIS — N25.81 SECONDARY HYPERPARATHYROIDISM OF RENAL ORIGIN: ICD-10-CM

## 2025-04-02 DIAGNOSIS — N18.4 STAGE 4 CHRONIC KIDNEY DISEASE: Primary | ICD-10-CM

## 2025-04-02 DIAGNOSIS — M54.42 BILATERAL LOW BACK PAIN WITH BILATERAL SCIATICA, UNSPECIFIED CHRONICITY: Primary | ICD-10-CM

## 2025-04-02 DIAGNOSIS — I10 PRIMARY HYPERTENSION: ICD-10-CM

## 2025-04-02 DIAGNOSIS — R80.1 PERSISTENT PROTEINURIA: ICD-10-CM

## 2025-04-02 PROCEDURE — 97810 ACUP 1/> WO ESTIM 1ST 15 MIN: CPT | Mod: HCNC | Performed by: ACUPUNCTURIST

## 2025-04-02 PROCEDURE — 99999 PR PBB SHADOW E&M-EST. PATIENT-LVL IV: CPT | Mod: PBBFAC,HCNC,, | Performed by: INTERNAL MEDICINE

## 2025-04-02 PROCEDURE — 3077F SYST BP >= 140 MM HG: CPT | Mod: HCNC,CPTII,S$GLB, | Performed by: INTERNAL MEDICINE

## 2025-04-02 PROCEDURE — 97811 ACUP 1/> W/O ESTIM EA ADD 15: CPT | Mod: HCNC | Performed by: ACUPUNCTURIST

## 2025-04-02 PROCEDURE — 3080F DIAST BP >= 90 MM HG: CPT | Mod: HCNC,CPTII,S$GLB, | Performed by: INTERNAL MEDICINE

## 2025-04-02 PROCEDURE — 1159F MED LIST DOCD IN RCRD: CPT | Mod: HCNC,CPTII,S$GLB, | Performed by: INTERNAL MEDICINE

## 2025-04-02 PROCEDURE — 3288F FALL RISK ASSESSMENT DOCD: CPT | Mod: HCNC,CPTII,S$GLB, | Performed by: INTERNAL MEDICINE

## 2025-04-02 PROCEDURE — 1125F AMNT PAIN NOTED PAIN PRSNT: CPT | Mod: HCNC,CPTII,S$GLB, | Performed by: INTERNAL MEDICINE

## 2025-04-02 PROCEDURE — 1100F PTFALLS ASSESS-DOCD GE2>/YR: CPT | Mod: HCNC,CPTII,S$GLB, | Performed by: INTERNAL MEDICINE

## 2025-04-02 PROCEDURE — 3008F BODY MASS INDEX DOCD: CPT | Mod: HCNC,CPTII,S$GLB, | Performed by: INTERNAL MEDICINE

## 2025-04-02 PROCEDURE — 1160F RVW MEDS BY RX/DR IN RCRD: CPT | Mod: HCNC,CPTII,S$GLB, | Performed by: INTERNAL MEDICINE

## 2025-04-02 PROCEDURE — 99215 OFFICE O/P EST HI 40 MIN: CPT | Mod: HCNC,S$GLB,, | Performed by: INTERNAL MEDICINE

## 2025-04-02 PROCEDURE — 1157F ADVNC CARE PLAN IN RCRD: CPT | Mod: HCNC,CPTII,S$GLB, | Performed by: INTERNAL MEDICINE

## 2025-04-02 PROCEDURE — 3066F NEPHROPATHY DOC TX: CPT | Mod: HCNC,CPTII,S$GLB, | Performed by: INTERNAL MEDICINE

## 2025-04-02 NOTE — PROGRESS NOTES
"Subjective:       Patient ID: Nadia Damon is a 69 y.o. female.    Chief Complaint:  CKD 5, hypertension    HPI    She presents to clinic today for routine follow-up.  She states that she is having a lot of pain in her left side and recently had acupuncture.  She describes the pain as an 8 to a 9/10.  She is scheduled to have nerve ablation done next week.  Her laboratory studies medications were reviewed.  All Nephrology related questions were answered to her satisfaction.    Review of Systems   Constitutional: Negative.    HENT: Negative.     Respiratory: Negative.     Cardiovascular: Negative.    Gastrointestinal: Negative.    Genitourinary: Negative.    Musculoskeletal:  Positive for back pain.   Skin: Negative.        BP (!) 160/90 (BP Location: Right arm, Patient Position: Sitting)   Pulse 100   Ht 5' 2" (1.575 m)   Wt 65 kg (143 lb 4.8 oz)   LMP 06/20/1983 (Within Years)   BMI 26.21 kg/m²     Lab Results   Component Value Date    WBC 3.62 (L) 02/24/2025    WBC 3.62 (L) 02/24/2025    HGB 9.1 (L) 02/24/2025    HGB 9.1 (L) 02/24/2025    HCT 29.2 (L) 02/24/2025    HCT 29.2 (L) 02/24/2025    MCV 92 02/24/2025    MCV 92 02/24/2025     02/24/2025     02/24/2025      BMP  Lab Results   Component Value Date     03/25/2025    K 4.1 03/25/2025     03/25/2025    CO2 20 (L) 03/25/2025    BUN 38 (H) 03/25/2025    CREATININE 3.2 (H) 03/25/2025    CALCIUM 9.3 03/25/2025    ANIONGAP 16 03/25/2025    ESTGFRAFRICA 19 (A) 07/14/2022    EGFRNONAA 17 (A) 07/14/2022     CMP  Sodium   Date Value Ref Range Status   03/25/2025 140 136 - 145 mmol/L Final   02/24/2025 142 136 - 145 mmol/L Final   02/24/2025 142 136 - 145 mmol/L Final     Potassium   Date Value Ref Range Status   03/25/2025 4.1 3.5 - 5.1 mmol/L Final   02/24/2025 4.7 3.5 - 5.1 mmol/L Final   02/24/2025 4.7 3.5 - 5.1 mmol/L Final     Chloride   Date Value Ref Range Status   03/25/2025 104 95 - 110 mmol/L Final   02/24/2025 105 95 - 110 " mmol/L Final   02/24/2025 105 95 - 110 mmol/L Final     CO2   Date Value Ref Range Status   03/25/2025 20 (L) 23 - 29 mmol/L Final   02/24/2025 21 (L) 23 - 29 mmol/L Final   02/24/2025 21 (L) 23 - 29 mmol/L Final     Glucose   Date Value Ref Range Status   02/24/2025 76 70 - 110 mg/dL Final   02/24/2025 76 70 - 110 mg/dL Final     BUN   Date Value Ref Range Status   03/25/2025 38 (H) 8 - 23 mg/dL Final     Creatinine   Date Value Ref Range Status   03/25/2025 3.2 (H) 0.5 - 1.4 mg/dL Final     Calcium   Date Value Ref Range Status   03/25/2025 9.3 8.7 - 10.5 mg/dL Final   02/24/2025 9.7 8.7 - 10.5 mg/dL Final   02/24/2025 9.7 8.7 - 10.5 mg/dL Final     Total Protein   Date Value Ref Range Status   02/24/2025 7.9 6.0 - 8.4 g/dL Final     Albumin   Date Value Ref Range Status   03/25/2025 3.6 3.5 - 5.2 g/dL Final   02/24/2025 3.3 (L) 3.5 - 5.2 g/dL Final   02/24/2025 3.3 (L) 3.5 - 5.2 g/dL Final     Total Bilirubin   Date Value Ref Range Status   02/24/2025 0.4 0.1 - 1.0 mg/dL Final     Comment:     For infants and newborns, interpretation of results should be based  on gestational age, weight and in agreement with clinical  observations.    Premature Infant recommended reference ranges:  Up to 24 hours.............<8.0 mg/dL  Up to 48 hours............<12.0 mg/dL  3-5 days..................<15.0 mg/dL  6-29 days.................<15.0 mg/dL       Alkaline Phosphatase   Date Value Ref Range Status   02/24/2025 120 40 - 150 U/L Final     AST   Date Value Ref Range Status   02/24/2025 42 (H) 10 - 40 U/L Final     ALT   Date Value Ref Range Status   02/24/2025 28 10 - 44 U/L Final     Anion Gap   Date Value Ref Range Status   03/25/2025 16 8 - 16 mmol/L Final     eGFR if    Date Value Ref Range Status   07/14/2022 19 (A) >60 mL/min/1.73 m^2 Final     eGFR if non    Date Value Ref Range Status   07/14/2022 17 (A) >60 mL/min/1.73 m^2 Final     Comment:     Calculation used to obtain the  estimated glomerular filtration  rate (eGFR) is the CKD-EPI equation.        Medications Ordered Prior to Encounter[1]         Objective:            Physical Exam  Constitutional:       Appearance: Normal appearance.   HENT:      Head: Normocephalic and atraumatic.   Eyes:      General: No scleral icterus.     Extraocular Movements: Extraocular movements intact.      Pupils: Pupils are equal, round, and reactive to light.   Pulmonary:      Effort: Pulmonary effort is normal.      Breath sounds: No stridor.   Musculoskeletal:      Right lower leg: Edema present.      Left lower leg: Edema present.   Skin:     General: Skin is warm and dry.   Neurological:      General: No focal deficit present.      Mental Status: She is alert and oriented to person, place, and time.   Psychiatric:         Mood and Affect: Mood normal.         Behavior: Behavior normal.       Assessment:       1. Stage 4 chronic kidney disease    2. Primary hypertension    3. Persistent proteinuria    4. Secondary hyperparathyroidism of renal origin        Plan:       1. Creatinine has improved from her last office visit decreasing from 4.1 down to 3.2.  She has ranged between 3.0 and 4.0 for the past several months.  Fluctuation in creatinine is hemodynamic in nature.  Loss of renal function is due to chronic calcineurin exposure as result of her heart transplant in 1993.    She is agreeable now to being referred to vascular surgery for creation of an AV access.  Will place a referral today to Dr. Cassidy for evaluation.  She does not have any symptoms of uremia.  We will continue to monitor closely.    2. Blood pressure was elevated in clinic today.  She states it is always high when she is in a lot of pain.  Will continue to monitor.    3. She has persistent low-grade proteinuria about 800 mg by PC ratio.  Will continue to monitor.    4. Intact PTH is elevated 188 consistent with her stage renal function.  Vitamin-D is normal at 50.  Phosphorus is  stable 4.0.  Calcium was normal at 9.3 with an albumin of 3.6.    A minimum of 40 minutes was spent in face-to-face conversation, chart review and coordination of care with other providers.        Gunner Melgar MD         [1]   Current Outpatient Medications on File Prior to Visit   Medication Sig Dispense Refill    aspirin (ECOTRIN) 81 MG EC tablet Take 1 tablet (81 mg total) by mouth once daily.      calcitRIOL (ROCALTROL) 0.25 MCG Cap Take 1 capsule (0.25 mcg total) by mouth once daily. 30 capsule 11    carvediloL (COREG) 3.125 MG tablet Take 1 tablet (3.125 mg total) by mouth 2 (two) times daily with meals. 180 tablet 0    cycloSPORINE modified, NEORAL, (NEORAL) 25 MG capsule Take 3 capsules (75 mg total) by mouth 2 (two) times daily. 540 capsule 11    furosemide (LASIX) 40 MG tablet Take 1 tablet (40 mg total) by mouth once daily. 30 tablet 11    LIDOcaine (LIDODERM) 5 % Place 1 patch onto the skin once daily. Remove & Discard patch within 12 hours or as directed by MD 30 patch 2    mupirocin (BACTROBAN) 2 % ointment Apply topically 2 (two) times daily. 22 g 0    NIFEdipine (PROCARDIA-XL) 60 MG (OSM) 24 hr tablet Take 1 tablet (60 mg total) by mouth once daily. 30 tablet 11    nitroGLYCERIN (NITROSTAT) 0.4 MG SL tablet PLACE 1 TABLET UNDER THE TONGUE EVERY 5 MINS AS NEEDED FOR CHEST PAIN FOR UP TO 3 DOSES.IF CONTINUED HEART PAIN/PRESSURE AFTER 100 tablet 0    ondansetron (ZOFRAN) 4 MG tablet Take 1 tablet (4 mg total) by mouth every 12 (twelve) hours as needed for Nausea. 16 tablet 0    oxyCODONE-acetaminophen (PERCOCET) 5-325 mg per tablet Take 1 tablet by mouth every 4 (four) hours as needed for Pain. 25 tablet 0    pantoprazole (PROTONIX) 20 MG tablet TAKE 1 TABLET EVERY DAY 90 tablet 1    patiromer calcium sorbitex (VELTASSA) 8.4 gram PwPk Take 1 packet (8.4 g total) by mouth once daily 30 packet 6    patiromer calcium sorbitex (VELTASSA) 8.4 gram PwPk Take 1 packet (8.4 g total) by mouth once daily. 90  packet 0    polyethylene glycol (GLYCOLAX) 17 gram PwPk Take 17 g by mouth once daily.      pregabalin (LYRICA) 50 MG capsule TAKE 1 CAPSULE TWICE DAILY 180 capsule 0    sodium bicarbonate 650 MG tablet Take 1 tablet (650 mg total) by mouth 2 (two) times daily. 60 tablet 1    temazepam (RESTORIL) 30 mg capsule TAKE 1 CAPSULE EVERY NIGHT AS NEEDED FOR INSOMNIA 30 capsule 1    tiZANidine (ZANAFLEX) 2 MG tablet Take 2 tablets (4 mg total) by mouth 2 (two) times a day. 120 tablet 5    vitamin E 400 UNIT capsule Take 400 Units by mouth once daily.      zolpidem (AMBIEN) 5 MG Tab Take 1 tablet (5 mg total) by mouth every evening. 30 tablet 5     No current facility-administered medications on file prior to visit.

## 2025-04-02 NOTE — PROGRESS NOTES
Acupuncture Evaluation Note     Name: Nadia Damon  Clinic Number: [id number]    Traditional Chinese Medicine (TCM) Diagnosis: Qi Stagnation and Blood Stasis  Medical Diagnosis:   Encounter Diagnoses   Name Primary?    Bilateral low back pain with bilateral sciatica, unspecified chronicity Yes    Other low back pain         Evaluation Date: 4/2/2025    Visit #/Visits authorized: 2/12    Precautions: Standard and organ transplant    Subjective     Chief Concern:   Pain management  (R) side sciatica  (R) leg numbness  Stroke - (R) arm and (R) leg  go numb and are weak , Drives with left foot - 8 months ago   Pain in hips/low back     Has had injections at [facility]  Nerve blocks on (L) side  Nerves burned on (R) side - still fels residual pain above the area with bending, burning,     Smooth move tea - helps with bowel movements, seems to work better than Miralax     LV and KI issues  Osteoporosis      Lives at home alone   Ultrasound showed no blockage in (R) leg  Feet hurt, hard to walk    Injections - for hemoglobin level -   Anti rejection drugs for heart transplant -   Can't change meds because of Kidneys     Poor sleep - up at 2-3 AM, she'll bake a night.   When she's in pain she will do something - baking, reading, puzzles  Sits in show/tub, has grab bars, pull lights where she lives -     Just had injections in low back   Sleeps in a recliner     Had an inner stim removed - that was for bladder, but didn't help     Took her off Cymbalta    Has had dry needling estim- that was too much for her, made her jumpy all day     Ambien for 30 years -       Medical necessity is demonstrated by the following IMPAIRMENTS: Medical Necessity: Decreased mobility limits day to day activities, social, and emergent situations and Decreased quality of life              Aggravating Factors:  movement, lying down, standing, and changing positions   Relieving Factors:  heat    Symptom Description:     Quality:  Aching, Burning,  Sharp, and Electric  Severity:  8  Frequency:  continuously    Previous Treatments Tried:  Injection(s), Medication, Imaging, and Therapy     HEENT:      Chest:      Digestion:     Diet:    Fluids: , ,   Taste/Appetite:    Symptoms:     Sleep: poor    Energy Levels:  see notes    Psychological Symptoms:  see notes    Other Symptoms:     GYN Symptoms:     Objective     Observation: walks with a cane    Tongue:      Body:     Color:     Coating:      Pulse:         New Findings:      Treatment     Treatment Principles:  move qi and blood stasis  *face down treatment!*    Acupuncture points used:      Bilateral points: GB29/30x3 bi lateral, UB22,23,32, 52, UB40,57  Unilateral points:  Auricular Treatment:      Needles In: 18  Needles Out: 18  Needles W/O STIM placed: 1:00  Needles W/O STIM removed: 1:30      Other Traditional Chinese Medicine Modalities -  heat lamp    Assessment     After treatment, patient felt okay     Patient prognosis is Good.     Patient will continue to benefit from acupuncture treatment to address the deficits listed in the problem list box on initial evaluation, provide patient family education and to maximize pt's level of independence in the home and community environment.     Patient's spiritual, cultural and educational needs considered and pt agreeable to plan of care and goals.     Anticipated barriers to treatment: none    Plan     Recommend 1 /week for 6 sessions then re-assess.      Education:  Patient is aware of cumulative benefit of acupuncture

## 2025-04-03 ENCOUNTER — TELEPHONE (OUTPATIENT)
Dept: HEMATOLOGY/ONCOLOGY | Facility: CLINIC | Age: 70
End: 2025-04-03
Payer: MEDICARE

## 2025-04-07 ENCOUNTER — TELEPHONE (OUTPATIENT)
Dept: HEMATOLOGY/ONCOLOGY | Facility: CLINIC | Age: 70
End: 2025-04-07
Payer: MEDICARE

## 2025-04-07 NOTE — TELEPHONE ENCOUNTER
Spoke with patient, she requested her lab, provider appointment, and injection be rescheduled to a different day.

## 2025-04-07 NOTE — TELEPHONE ENCOUNTER
----- Message from Brynn sent at 4/7/2025 10:35 AM CDT -----  Contact: Nadia  .Patient is calling to speak with the nurse regarding appts . Reports needing to reschedule  appt due to the patient have to take transportation.Pts states transportation will not transport pt to two different locations  . Please give patient a call back at .[phone]

## 2025-04-08 ENCOUNTER — CLINICAL SUPPORT (OUTPATIENT)
Dept: PRIMARY CARE CLINIC | Facility: CLINIC | Age: 70
End: 2025-04-08
Payer: MEDICARE

## 2025-04-08 DIAGNOSIS — F60.5 OBSESSIVE COMPULSIVE PERSONALITY DISORDER: ICD-10-CM

## 2025-04-08 DIAGNOSIS — F41.8 DEPRESSION WITH ANXIETY: Primary | ICD-10-CM

## 2025-04-08 PROCEDURE — 99499 UNLISTED E&M SERVICE: CPT | Mod: HCNC,S$GLB,,

## 2025-04-08 NOTE — PROGRESS NOTES
DATE:  2025  REFERRAL SOURCE:  Elis Wick MD  TYPE OF VISIT:  In person  LENGTH OF SESSION: 60  .  HISTORY OF PRESENTING ILLNESS:  Nadia Damon, a 69 y.o. female with history of Anxiety disorders; anxiety, unspecified [F41.9], Major Depressive Disorder, Recurrent, Moderate (F33.1), and Persistent insomnia with other symptoms [G47.00].       CHIEF COMPLAINT/REASON FOR ENCOUNTER: Pt's chief complaint includes the following:  sleep, and grief.    PSYCHIATRIC HISTORY:  Patient does not currently have a psychiatrist.    Patient does not currently have a therapist.     Pt is taking temazepam (Restoril) 30mg once daily and zolpidem (Ambien) 0.5mg once daily for sleep.  They are not interested in medication changes.  Previous Psychiatric Hospitalizations:  No  History of Trauma:  Heart transplant; CVA.    History of Violence:  No  Access to a gun/weapon:  No  Previous Suicide Attempts:  No    Risk assessment:  Patient reports no suicidal ideation  Patient reports no homicidal ideation  Patient reports no self-injurious behavior  Patient reports no violent behavior    PSYCHIATRIC FAMILY HISTORY:  Parents were alcoholic; SonMoy is an alcoholic.  Grandson has hx of depression      SOCIAL HISTORY (MARRIAGE, EMPLOYMENT, etc.):  Living Situation: Alone, at [facility].   Relationship status Single; never .   Children/Family: 2 sons. 1 sonFlakito, is .  SonMoy Jr lives in [place] with wife.  Estranged granddtrQiana.  Estranged grandsonMoy.  Cousin.   Supports:Hoahaoism friends.   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  Church/Spirituality: [employer]. Volunteers as a .   Hobbies and Interests: Crafting.     Current social stressors:   CVA 2024.  Loss of driving.CKD 4. May need dialysis soon.  Hx of heart transplant in .  Transplanted heart now at [facility].   Grandson's arrest in 2024; news  "coverage of his arrest.    Death of son, Flakito.  Years ago. Loss of contact with Flakito's daughter, Qiana (or Alda Kiran).       Current symptoms:  Depression: decreased appetite.  Anxiety: denies.  Insomnia:  chronic insomnia. Years of dealing with this.  .  Astrid:  pressured speech.  Psychosis: denies .    MENTAL HEALTH STATUS EXAM  General Appearance:  unremarkable, age appropriate   Speech: normal pitch, normal volume, pressured, rapid      Level of Cooperation: cooperative      Thought Processes: normal and logical   Mood: steady      Thought Content: normal, no suicidality, no homicidality, delusions, or paranoia   Affect: congruent and appropriate   Orientation: Oriented x3   Memory: Appears WNL; patient not tested.    Attention Span & Concentration: intact   Fund of General Knowledge: intact and appropriate to age and level of education   Judgment & Insight: good   Language  intact     Session Content/Presenting Problem Hx:  November 2024: PHQ 13; LUPE 15  February 2025 PHQ/LUPE: 16/16    Goal is to resume her active life.   From initial assessment: She says "I go up up up then will cry half the day." Patient describes herself as someone who will try to push through any obstacles; she tries not to let things get her down. Ends up feeling overwhelmed. Fear of being judged by others.    8th session. Met with patient.  Appears to be in less pain today.Going to see her pain management doctor tomorrow. Getting another nerve block.  Having a lot of chest pain so taking nitroglycerin almost every day or every other day.   Describes sitting and holding her chest.   Empathized with patient as she describes living with complex medical issues.    Patient reports some success in calming herself one time this past week.  She actually fell asleep and took a nap.  She discusses her difficulty in calming her mind enough to allow herself to relax; had to talk herself out of thoughts relating to various chores.   She used an " "aleksandar on her phone of [employer] which she finds relaxing; this helped take her mind off household tasks. She describes some of these thoughts: rearrange her towels in linen closet to line up correctly; rearrange items in her refrigerator.   Patient is supported and commended for having successfully allowed herself to relax enough to nap.  She is encouraged to continue with these efforts.  Explored with patient how to acknowledge the "busy" thoughts and then put them to rest with deliberate alternative thoughts.  Discussed the benefit of allowing herself to rest.  Patient expresses understanding of these concepts and agrees to continue her efforts to relax more often.   Continued discussion of the stressor of her son's alcoholism and his verbal outbursts directed at her and at his wife.  Patient expresses an understanding of the need to protect her own mental well being by maintaining boundaries with her son.  She continues her efforts to be supportive of her daughter in law while also encouraging dtr in law to allow her son to suffer the consequences of his actions.   Patient reports feeling mentally ready to begin dialysis. She will meet with the surgeon soon to discuss having access placed.  She has been watching videos about dialysis and plans to attend a Kidney Smart class soon.  Patient is supported in her efforts to do all she can to continue to maintain her health as much as is possible.         Prior Session:   7th session. Had acupuncture this week.  To have more sessions. Enjoyed it very much. Pain is better. Pending another procedure with Dr. Pierre to "cut some nerves."   Expresses having a good rapport with Dr. Pierre.   Has not slept. Cleaned house throughout the night. Was cleaning kitchen, then the floor, then the line in toilet drove her nuts, sat on stool to clean the toilet. Using denture tablets to clean out the toilet. Sits in bathtub to clean all around the rim.   Wants to apply for " lt-pcs. She called and they told her that she needs a referral from a doctor. She also plans to speak with Jatinder Palomares at [facility] for possible assistance.  Discussed the process for applying for lt-pcs or waiver services.  Advised patient she may not qualify for too much due to her apparent independence in cleaning and cooking for herself.  Will assist patient in locating possible helpful resources.  She worries about being a burden on friends or family.   She is aware that at times she cannot stop herself from talking. Was on a calming medicine at one time that she found very helpful; it was shortly after her heart transplant.  As she describes how the medicine allowed herself to become calm and restful patient begins to relax, she leans back and rests her head.  Patient is encouraged to continue to relax herself and to focus on deep slow breathing and focused relaxation. Patient expresses feeling very good after the practice.  She is encouraged to continue to do this at home.   Discussed the recurrent thoughts telling her to do one more thing and then one more thing.  She will use thought stopping techniques to interrupt the recurrent thoughts and will work on maintaining a relaxed resting state.   She is encouraged to sleep, whether daytime or nighttime, whenever she feels sleepy.  Will use [employer] aleksandar to help calm and quiet herself enough to rest.         STRENGTHS AND LIABILITIES: Strength: Patient is expressive/articulate., Strength: Patient is motivated for change., Strength: Patient has positive support network.    IMPRESSION:   My diagnostic impression is Anxiety disorders; anxiety, unspecified [F41.9], MAJOR DEPRESSIVE DISORDER, SINGLE EPISODE, Moderate (F32.1), and uncomp bereavement, as evidenced by patient's description of increased feelings of sadness and fear related to recent hospitalization; feeling down at times, tearfulness, sadness from loss of son, grandson, granddaughter, family  stressors, health stressors. She does not sleep well.     TREATMENT GOALS: Anxiety: reducing negative automatic thoughts, reducing physical symptoms of anxiety, and reducing time spent worrying (<30 minutes/day)  Depression: increasing self-reward for positive behaviors (one/day), increasing self-reward for positive thoughts (one/day), and reducing fatigue  Grief: process grief related to son's death, estrangement from a grandson and a granddaughter.     PLAN: In this session a psychosocial evaluation was conducted to get history and determine a treatment plan. CBT will be utilized in future individual  therapy sessions to increase interaction, insight, support, and behavior modification.     NEXT APPOINTMENT:

## 2025-04-09 ENCOUNTER — OFFICE VISIT (OUTPATIENT)
Dept: SURGERY | Facility: CLINIC | Age: 70
End: 2025-04-09
Payer: MEDICARE

## 2025-04-09 VITALS
DIASTOLIC BLOOD PRESSURE: 100 MMHG | BODY MASS INDEX: 26.25 KG/M2 | HEART RATE: 100 BPM | WEIGHT: 143.5 LBS | SYSTOLIC BLOOD PRESSURE: 178 MMHG

## 2025-04-09 DIAGNOSIS — C77.3 SECONDARY AND UNSPECIFIED MALIGNANT NEOPLASM OF AXILLA AND UPPER LIMB LYMPH NODES: Primary | ICD-10-CM

## 2025-04-09 DIAGNOSIS — D05.12 DUCTAL CARCINOMA IN SITU (DCIS) OF LEFT BREAST: ICD-10-CM

## 2025-04-09 DIAGNOSIS — K43.9 VENTRAL HERNIA WITHOUT OBSTRUCTION OR GANGRENE: ICD-10-CM

## 2025-04-09 PROCEDURE — 99999 PR PBB SHADOW E&M-EST. PATIENT-LVL IV: CPT | Mod: PBBFAC,HCNC,, | Performed by: SURGERY

## 2025-04-09 PROCEDURE — 3080F DIAST BP >= 90 MM HG: CPT | Mod: HCNC,CPTII,S$GLB, | Performed by: SURGERY

## 2025-04-09 PROCEDURE — 99214 OFFICE O/P EST MOD 30 MIN: CPT | Mod: HCNC,S$GLB,, | Performed by: SURGERY

## 2025-04-09 PROCEDURE — 1160F RVW MEDS BY RX/DR IN RCRD: CPT | Mod: HCNC,CPTII,S$GLB, | Performed by: SURGERY

## 2025-04-09 PROCEDURE — 1159F MED LIST DOCD IN RCRD: CPT | Mod: HCNC,CPTII,S$GLB, | Performed by: SURGERY

## 2025-04-09 PROCEDURE — 3077F SYST BP >= 140 MM HG: CPT | Mod: HCNC,CPTII,S$GLB, | Performed by: SURGERY

## 2025-04-09 PROCEDURE — 3008F BODY MASS INDEX DOCD: CPT | Mod: HCNC,CPTII,S$GLB, | Performed by: SURGERY

## 2025-04-09 PROCEDURE — 1157F ADVNC CARE PLAN IN RCRD: CPT | Mod: HCNC,CPTII,S$GLB, | Performed by: SURGERY

## 2025-04-09 PROCEDURE — 3066F NEPHROPATHY DOC TX: CPT | Mod: HCNC,CPTII,S$GLB, | Performed by: SURGERY

## 2025-04-09 PROCEDURE — 1125F AMNT PAIN NOTED PAIN PRSNT: CPT | Mod: HCNC,CPTII,S$GLB, | Performed by: SURGERY

## 2025-04-09 NOTE — PROGRESS NOTES
General Surgery Clinic  Follow-Up    Patient Name: Nadia Damon  YOB: 1955 (69 y.o.)  MRN: [id number]  Today's Date: 04/09/2025    Referring Md:   No referring provider defined for this encounter.    SUBJECTIVE:     Chief Complaint:  Ventral hernia    History of Present Illness:  Nadia Damon is a 69 y.o. female with past medical history of heart transplant status post left breast lumpectomy 09/10/2021 with sentinel lymph node biopsy 10/12/2021 status post Port-A-Cath placement 11/21 presents for interval evaluation of the hernia.  She denies any changes repair hernia.  Areas continued to bulge out.  Diet and bowel function unchanged.  Currently following with pain management for back pain.    presents to further discuss ventral hernia.  She reports chronic nausea intermittently in his concerned this is related to her hernia.  She reports the hernia will bulge out when she is up and moving around and go back down when lying flat.  She continues to follow with transplant Cardiology for her previous heart transplant and reports 1 of her valve is leaking and may require treatment.    Review of patient's allergies indicates:   Allergen Reactions    Dobutamine Other (See Comments)     Severe Left sided chest pain after dosage administered for Dobutamine Stress Test; itching    Lisinopril Swelling and Rash    Hydrocodone-acetaminophen Nausea Only    Augmentin [amoxicillin-pot clavulanate] Diarrhea    Zyvox [linezolid] Nausea And Vomiting       Past Medical History:   Diagnosis Date    Abdominal wall hernia     CT Renal 6/11/2018---Small fat containing superior ventral abdominal wall hernia at the epicardial pacing lead site.    Abnormal mammogram 10/12/2021    Acute cystitis without hematuria 05/10/2022    Acute ischemic right middle cerebral artery (MCA) stroke 07/20/2024    Adverse drug reaction 11/12/2024    GRACE (acute kidney injury) 11/22/2021    Anxiety     Aphasia 07/19/2024    Arthritis     ZEN  HIPS    Breast cancer in female 08/2021    LEFT BREAST    Breast mass, right 11/13/2024    RIGHT BREAST MASS (Problem)      Bronchitis 08/18/2016    Never smoked      C. difficile colitis 11/29/2021    Cellulitis of axilla, left 12/23/2021    Chronic diastolic heart failure 12/16/2021    Chronic kidney disease     stage 4, GFR 15-29 ml/min    Chronic midline low back pain without sciatica 06/18/2018    CKD (chronic kidney disease) stage 4, GFR 15-29 ml/min 04/20/2016    US Retro   5/16/2018---Mild cortical thinning and increased cortical echogenicity.  Findings can be seen with medical renal disease.  8/31/216--- Echogenic kidneys with diffuse cortical thinning suggesting  medical renal disease. 2 complex right renal cortical cysts.      Closed nondisplaced fracture of distal phalanx of left great toe with routine healing 10/22/2018    Coronary artery disease 1993    heart transplant    COVID-19 in immunocompromised patient 02/26/2024    Cystitis 05/10/2022    Depression     Encounter for blood transfusion     Encounter for palliative care 10/14/2024    Fibromyalgia     on Lyrica    Heart failure     native heart cardiomyopathy    Heart transplanted 1993    due to cardiomyopathy    History of hyperparathyroidism; Hyperparathyroidism, secondary renal     PT DENIES    Hypertension     Immune disorder     anti rejection meds    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    Impaired mobility 07/28/2022    Iron deficiency anemia 08/15/2017    Kidney stones     passed per pt    Obesity     Obesity (BMI 30.0-34.9) 07/22/2019    Other osteoporosis without current pathological fracture 08/30/2019    Other pancytopenia 05/06/2024    Parotitis, acute 09/19/2023    Pharyngitis 01/09/2019    Pneumonia due to infectious organism 03/14/2024    PONV (postoperative nausea and vomiting)     Severe sepsis 11/22/2021    Shingles 2003 approx    left leg    Stage 4 chronic kidney disease 11/22/2021    Subclinical  hypothyroidism 06/16/2023    Thrombocytopenia, unspecified 11/29/2021    Trouble in sleeping     Unresponsiveness 11/13/2024    Urinary incontinence      Past Surgical History:   Procedure Laterality Date    bladder implant Right 06/11/2024    BLADDER SURGERY  2015 approx    mesh - Dr Everett then 2nd reconstructive sx Dr Onofre    BREAST BIOPSY Bilateral     NEGATIVE    BREAST BIOPSY Right 10/31/2022    benign    BREAST LUMPECTOMY Left 2021    BREAST SURGERY Left 09/28/2015    Bx - benign    BREAST SURGERY Right 12/2015    Bx benign    CARDIAC PACEMAKER REMOVAL Left 06/26/2014    Pacer defirillator removed. Put in 1993 aat time of heart transplant    CARPAL TUNNEL RELEASE Left 03/03/2015    Dr. Hall    COLONOSCOPY N/A 02/25/2021    Procedure: COLONOSCOPY;  Surgeon: Freida Ramirez MD;  Location: [facility];  Service: Endoscopy;  Laterality: N/A;    CYSTOCELE REPAIR      Twice with mesh removal    EPIDURAL STEROID INJECTION INTO CERVICAL SPINE N/A 02/02/2023    Procedure: T11/T12 IL HELLEN;  Surgeon: Jassi Pierre MD;  Location: [facility];  Service: Pain Management;  Laterality: N/A;    EPIDURAL STEROID INJECTION INTO CERVICAL SPINE N/A 9/16/2024    Procedure: T11/T12 IL HELLEN;  Surgeon: Jassi Pierre MD;  Location: [facility];  Service: Pain Management;  Laterality: N/A;    HEART TRANSPLANT  1993    HERNIA REPAIR Right 1971 approx    Inguinal    HYSTERECTOMY  1983    vag hyst /LSO     IMPLANTATION OF PERMANENT SACRAL NERVE STIMULATOR N/A 06/11/2024    Procedure: INSERTION, NEUROSTIMULATOR, PERMANENT, SACRAL;  Surgeon: Sami Cochran MD;  Location: [facility] OR;  Service: Urology;  Laterality: N/A;    INCISION AND DRAINAGE OF ABSCESS Left 12/24/2021    Procedure: INCISION AND DRAINAGE, ABSCESS;  Surgeon: Joseph Longo MD;  Location: [facility] OR;  Service: General;  Laterality: Left;    INJECTION OF ANESTHETIC AGENT AROUND MEDIAL BRANCH NERVES INNERVATING LUMBAR FACET JOINT Right 10/19/2022     Procedure: Right L4/L5 and L5/S1 MBB;  Surgeon: Jassi Pierre MD;  Location: [facility];  Service: Pain Management;  Laterality: Right;    INJECTION OF ANESTHETIC AGENT AROUND MEDIAL BRANCH NERVES INNERVATING LUMBAR FACET JOINT Right 11/09/2022    Procedure: Right L4/L5 and L5/S1 MBB;  Surgeon: Jassi Pierre MD;  Location: [facility];  Service: Pain Management;  Laterality: Right;    INJECTION OF ANESTHETIC AGENT AROUND MEDIAL BRANCH NERVES INNERVATING LUMBAR FACET JOINT Left 2/6/2025    Procedure: Left L4-5 and L5-S1 MBB #1 with local;  Surgeon: Jassi Pierre MD;  Location: [facility];  Service: Pain Management;  Laterality: Left;    INJECTION OF ANESTHETIC AGENT AROUND MEDIAL BRANCH NERVES INNERVATING LUMBAR FACET JOINT Left 3/19/2025    Procedure: Left L4-5 and L5-S1 MBB #2 with local;  Surgeon: Jassi Pierre MD;  Location: [facility];  Service: Pain Management;  Laterality: Left;    INJECTION OF ANESTHETIC AGENT INTO SACROILIAC JOINT Right 08/22/2022    Procedure: Right SIJ Injection Right L5/S1 Facte Injection;  Surgeon: Jassi Pierre MD;  Location: [facility];  Service: Pain Management;  Laterality: Right;    INSERTION OF TUNNELED CENTRAL VENOUS CATHETER (CVC) WITH SUBCUTANEOUS PORT N/A 11/09/2021    Procedure: [id number]-G-CATH;  Surgeon: Christoph Douglas MD;  Location: [facility] OR;  Service: General;  Laterality: N/A;    RADIOFREQUENCY ABLATION Right 12/5/2024    Procedure: Right L4-5 and L5-S1 RFA;  Surgeon: Jassi Pierre MD;  Location: [facility];  Service: Pain Management;  Laterality: Right;    RADIOFREQUENCY THERMOCOAGULATION Right 12/07/2022    Procedure: Right L4/L5 and L5/S1 Lumbar RFA;  Surgeon: Jassi Pierre MD;  Location: [facility];  Service: Pain Management;  Laterality: Right;    REMOVAL OF ELECTRODE LEAD OF SACRAL NERVE STIMULATOR N/A 2/18/2025    Procedure: REMOVAL, ELECTRODE LEAD, SACRAL NERVE STIMULATOR;  Surgeon: Sami Cochran MD;   Location: [facility] OR;  Service: Urology;  Laterality: N/A;    REMOVAL OF VASCULAR ACCESS PORT      ROBOT-ASSISTED LAPAROSCOPIC ABDOMINAL SACROCOLPOPEXY N/A 08/10/2023    Procedure: ROBOTIC SACROCOLPOPEXY, ABDOMEN;  Surgeon: PRANAY Villalobos MD;  Location: [facility] OR;  Service: OB/GYN;  Laterality: N/A;    ROBOT-ASSISTED LAPAROSCOPIC OOPHORECTOMY Right 08/10/2023    Procedure: ROBOTIC OOPHORECTOMY;  Surgeon: PRANAY Villalobos MD;  Location: [facility] OR;  Service: OB/GYN;  Laterality: Right;    SENTINEL LYMPH NODE BIOPSY Left 10/12/2021    Procedure: BIOPSY, LYMPH NODE, SENTINEL;  Surgeon: Christoph Douglas MD;  Location: [facility] OR;  Service: General;  Laterality: Left;    TOE SURGERY      XI ROBOTIC URETHROPEXY N/A 08/10/2023    Procedure: XI ROBOTIC URETHROPEXY;  Surgeon: PRANAY Villalobos MD;  Location: [facility] OR;  Service: OB/GYN;  Laterality: N/A;     Family History   Problem Relation Name Age of Onset    Cancer Mother  38        breast    Breast cancer Mother      Breast cancer Maternal Grandmother      Heart disease Maternal Grandmother      Hypertension Son      Cataracts Cousin      Diabetes Neg Hx      Stroke Neg Hx      Kidney disease Neg Hx      Asthma Neg Hx      COPD Neg Hx      Melanoma Neg Hx      Hyperlipidemia Neg Hx       Social History     Tobacco Use    Smoking status: Never     Passive exposure: Never    Smokeless tobacco: Never   Substance Use Topics    Alcohol use: Never     Alcohol/week: 0.0 standard drinks of alcohol    Drug use: No        Review of Systems:  Review of Systems   Constitutional:  Negative for chills and fever.   HENT:  Negative for congestion and sore throat.    Respiratory:  Negative for cough and shortness of breath.    Cardiovascular:  Negative for chest pain and leg swelling.   Gastrointestinal:  Negative for abdominal pain, nausea and vomiting.   Genitourinary:  Negative for dysuria.   Musculoskeletal:  Negative for myalgias.   Skin:  Negative for rash.   Neurological:  Negative  for weakness and headaches.       OBJECTIVE:     Vital Signs (Most Recent)  BP (!) 178/100   Pulse 100   Wt 65.1 kg (143 lb 8.3 oz)   LMP 06/20/1983 (Within Years)   BMI 26.25 kg/m²     Physical Exam  Constitutional:       Appearance: She is well-developed.   HENT:      Head: Normocephalic and atraumatic.      Right Ear: External ear normal.      Left Ear: External ear normal.      Mouth/Throat:      Pharynx: No oropharyngeal exudate.   Eyes:      General: No scleral icterus.        Right eye: No discharge.         Left eye: No discharge.      Conjunctiva/sclera: Conjunctivae normal.      Pupils: Pupils are equal, round, and reactive to light.   Neck:      Thyroid: No thyromegaly.   Cardiovascular:      Rate and Rhythm: Normal rate.   Pulmonary:      Effort: Pulmonary effort is normal.   Chest:   Breasts:     Right: No inverted nipple, mass, nipple discharge, skin change or tenderness.      Left: No inverted nipple, mass, nipple discharge, skin change or tenderness.       Abdominal:      Palpations: Abdomen is soft.       Musculoskeletal:         General: Normal range of motion.      Right shoulder: No crepitus. Normal strength.      Cervical back: Normal range of motion and neck supple.   Lymphadenopathy:      Head:      Right side of head: No submental, submandibular, tonsillar, preauricular, posterior auricular or occipital adenopathy.      Left side of head: No submental, submandibular, tonsillar, preauricular, posterior auricular or occipital adenopathy.      Cervical: No cervical adenopathy.      Right cervical: No superficial or posterior cervical adenopathy.     Left cervical: No superficial or posterior cervical adenopathy.      Upper Body:      Right upper body: No supraclavicular adenopathy.      Left upper body: No supraclavicular adenopathy.   Skin:     General: Skin is warm and dry.      Coloration: Skin is not pale.      Findings: No erythema or rash.   Neurological:      Mental Status: She is  alert and oriented to person, place, and time.      Deep Tendon Reflexes: Reflexes are normal and symmetric.   Psychiatric:         Behavior: Behavior normal.         Thought Content: Thought content normal.         Judgment: Judgment normal.       CT 2/25:   Impression:     Midline ventral hernia with a 4.3 cm neck containing herniated liver      ASSESSMENT/PLAN:     Nadia Damon is a 66 y.o. female status post left breast lumpectomy/sentinel lymph node biopsy    Ventral hernia    No new symptoms related to hernia, CT reviewed hernia containing portion of the liver also previous genetic wires noted to be involved in hernias well  Hernias reducible containing portion of the liver no bowel noted on exam or imaging  Currently elective hernia repair not indicated due to underlying cardiac dysfunction  Should she require hernia repair would recommend to be at center with cardiac surgery and Cardiology heart failure services        30 minutes of total time spent on the encounter, which includes face to face time and non-face to face time preparing to see the patient (eg, review of tests), Obtaining and/or reviewing separately obtained history, Documenting clinical information in the electronic or other health record, Independently interpreting results (not separately reported) and communicating results to the patient/family/caregiver, or Care coordination (not separately reported).

## 2025-04-10 ENCOUNTER — OFFICE VISIT (OUTPATIENT)
Dept: PAIN MEDICINE | Facility: CLINIC | Age: 70
End: 2025-04-10
Payer: MEDICARE

## 2025-04-10 VITALS
WEIGHT: 145.5 LBS | SYSTOLIC BLOOD PRESSURE: 147 MMHG | RESPIRATION RATE: 17 BRPM | BODY MASS INDEX: 26.78 KG/M2 | HEIGHT: 62 IN | HEART RATE: 93 BPM | DIASTOLIC BLOOD PRESSURE: 94 MMHG

## 2025-04-10 DIAGNOSIS — M47.816 LUMBAR SPONDYLOSIS: Primary | ICD-10-CM

## 2025-04-10 PROCEDURE — 99999 PR PBB SHADOW E&M-EST. PATIENT-LVL IV: CPT | Mod: PBBFAC,HCNC,, | Performed by: ANESTHESIOLOGY

## 2025-04-10 NOTE — PROGRESS NOTES
Patient was not called to schedule left L4-5 and L5-S1 RFA.  We will get patient is scheduled for this procedure.

## 2025-04-14 ENCOUNTER — PATIENT MESSAGE (OUTPATIENT)
Dept: PRIMARY CARE CLINIC | Facility: CLINIC | Age: 70
End: 2025-04-14
Payer: MEDICARE

## 2025-04-14 ENCOUNTER — TELEPHONE (OUTPATIENT)
Dept: PALLIATIVE MEDICINE | Facility: CLINIC | Age: 70
End: 2025-04-14
Payer: MEDICARE

## 2025-04-14 ENCOUNTER — TELEPHONE (OUTPATIENT)
Dept: PRIMARY CARE CLINIC | Facility: CLINIC | Age: 70
End: 2025-04-14
Payer: MEDICARE

## 2025-04-14 NOTE — TELEPHONE ENCOUNTER
Spoke with patient and scheduled lab appointment. Stated other appointments will be done by schedulers shortly.

## 2025-04-14 NOTE — TELEPHONE ENCOUNTER
----- Message from Brynn sent at 4/14/2025  8:39 AM CDT -----  Contact: Nadia  .Patient is calling to speak with the nurse regarding concerns  . Reports wanting a call back about appt that was missed on 04/09/2025 . Please give patient a call back at .[phone]

## 2025-04-14 NOTE — TELEPHONE ENCOUNTER
Left vm for Ms. Damon concerning possible addition of sertraline.  Will send [facility] message also.  Our next clinic f/u scheduled for 4/25/25.

## 2025-04-15 DIAGNOSIS — F51.01 PRIMARY INSOMNIA: ICD-10-CM

## 2025-04-15 RX ORDER — TEMAZEPAM 30 MG/1
30 CAPSULE ORAL NIGHTLY PRN
Qty: 30 CAPSULE | Refills: 1 | Status: SHIPPED | OUTPATIENT
Start: 2025-04-15

## 2025-04-15 RX ORDER — SERTRALINE HYDROCHLORIDE 25 MG/1
25 TABLET, FILM COATED ORAL DAILY
Qty: 30 TABLET | Refills: 11 | Status: SHIPPED | OUTPATIENT
Start: 2025-04-15 | End: 2026-04-15

## 2025-04-15 NOTE — PROGRESS NOTES
Alerted by [facility] Pharmacist of pt's recent refill of ambien 5 mg.  Spoke w Ms. Damon regarding concerns w combining ambien and temazepam.  Pt prefers to discontinue ambien and cont w temazepam instead.

## 2025-04-16 ENCOUNTER — LAB VISIT (OUTPATIENT)
Dept: LAB | Facility: HOSPITAL | Age: 70
End: 2025-04-16
Attending: NURSE PRACTITIONER
Payer: MEDICARE

## 2025-04-16 DIAGNOSIS — N18.5 ANEMIA ASSOCIATED WITH STAGE 5 CHRONIC RENAL FAILURE: ICD-10-CM

## 2025-04-16 DIAGNOSIS — D63.1 ANEMIA ASSOCIATED WITH STAGE 5 CHRONIC RENAL FAILURE: ICD-10-CM

## 2025-04-16 LAB
ABSOLUTE EOSINOPHIL (OHS): 0.14 K/UL
ABSOLUTE MONOCYTE (OHS): 0.45 K/UL (ref 0.3–1)
ABSOLUTE NEUTROPHIL COUNT (OHS): 1.39 K/UL (ref 1.8–7.7)
BASOPHILS # BLD AUTO: 0.01 K/UL
BASOPHILS NFR BLD AUTO: 0.3 %
ERYTHROCYTE [DISTWIDTH] IN BLOOD BY AUTOMATED COUNT: 15.9 % (ref 11.5–14.5)
HCT VFR BLD AUTO: 30.6 % (ref 37–48.5)
HGB BLD-MCNC: 9.9 GM/DL (ref 12–16)
IMM GRANULOCYTES # BLD AUTO: 0.02 K/UL (ref 0–0.04)
IMM GRANULOCYTES NFR BLD AUTO: 0.7 % (ref 0–0.5)
LYMPHOCYTES # BLD AUTO: 1.03 K/UL (ref 1–4.8)
MCH RBC QN AUTO: 29.6 PG (ref 27–31)
MCHC RBC AUTO-ENTMCNC: 32.4 G/DL (ref 32–36)
MCV RBC AUTO: 92 FL (ref 82–98)
NUCLEATED RBC (/100WBC) (OHS): 0 /100 WBC
PLATELET # BLD AUTO: 170 K/UL (ref 150–450)
PMV BLD AUTO: 9.4 FL (ref 9.2–12.9)
RBC # BLD AUTO: 3.34 M/UL (ref 4–5.4)
RELATIVE EOSINOPHIL (OHS): 4.6 %
RELATIVE LYMPHOCYTE (OHS): 33.9 % (ref 18–48)
RELATIVE MONOCYTE (OHS): 14.8 % (ref 4–15)
RELATIVE NEUTROPHIL (OHS): 45.7 % (ref 38–73)
WBC # BLD AUTO: 3.04 K/UL (ref 3.9–12.7)

## 2025-04-16 PROCEDURE — 85025 COMPLETE CBC W/AUTO DIFF WBC: CPT | Mod: HCNC

## 2025-04-16 PROCEDURE — 36415 COLL VENOUS BLD VENIPUNCTURE: CPT | Mod: HCNC

## 2025-04-21 ENCOUNTER — TELEPHONE (OUTPATIENT)
Dept: PAIN MEDICINE | Facility: CLINIC | Age: 70
End: 2025-04-21
Payer: MEDICARE

## 2025-04-21 RX ORDER — TIZANIDINE 2 MG/1
TABLET ORAL
Qty: 360 TABLET | Refills: 0 | OUTPATIENT
Start: 2025-04-21

## 2025-04-21 RX ORDER — TIZANIDINE 2 MG/1
TABLET ORAL
Refills: 0 | OUTPATIENT
Start: 2025-04-21

## 2025-04-21 NOTE — TELEPHONE ENCOUNTER
----- Message from Brynn sent at 4/21/2025 11:30 AM CDT -----  Contact: Nadia  .Type:  Patient Returning CallWho Called:Nadia Who Left Message for Patient:nurse Does the patient know what this is regarding?:appt Would the patient rather a call back or a response via [facility]? Call Best Call Back Number:.[phone] Additional Information: Pt requesting a call back

## 2025-04-21 NOTE — TELEPHONE ENCOUNTER
Inform the patient that I do not have the time for the procedure. Also inform the patient that I will notify the procedure deparment and someone will call today.     Kye Mejia   Medical Assistant

## 2025-04-22 ENCOUNTER — PATIENT MESSAGE (OUTPATIENT)
Dept: PAIN MEDICINE | Facility: HOSPITAL | Age: 70
End: 2025-04-22
Payer: MEDICARE

## 2025-04-22 ENCOUNTER — TELEPHONE (OUTPATIENT)
Dept: INFUSION THERAPY | Facility: HOSPITAL | Age: 70
End: 2025-04-22
Payer: MEDICARE

## 2025-04-22 NOTE — TELEPHONE ENCOUNTER
----- Message from Deepali sent at 4/9/2025  7:23 AM CDT -----  Contact: Patient [phone]  .1MEDICALADVICE Patient is calling for Medical Advice regarding:Good Morning, Patient needed to cancel visit for today. Patient asked for a call back to reschedule her visitsHow long has patient had these symptoms:Pharmacy name and phone#:Patient wants a call back or thru [facility]:Please call and advise Comments:Please advise patient replies from provider may take up to 48 hours.

## 2025-04-22 NOTE — TELEPHONE ENCOUNTER
I spoke with patient and scheduled her tomorrow to f/u with Scott Mendoza NP with possible retacrit.

## 2025-04-22 NOTE — PRE-PROCEDURE INSTRUCTIONS
Spoke with patient regarding procedure scheduled on 4.24     Arrival time 1100     Has patient been sick with fever or on antibiotics within the last 7 days? No     Does the patient have any open wounds, sores or rashes? No     Does the patient have any recent fractures? no     Has patient received a vaccination within the last 7 days? No     Received the COVID vaccination? yes     Has the patient stopped all medications as directed? na     Does patient have a pacemaker, defibrillator, or implantable stimulator? PACEMAKER     Does the patient have a ride to and from procedure and someone reliable to remain with patient?  YASMEEN     Is the patient diabetic? no      Does the patient have sleep apnea? Or use O2 at home? no     Is the patient receiving sedation? Yes      Is the patient instructed to remain NPO beginning at midnight the night before their procedure? yes     Procedure location confirmed with patient? Yes     Covid- Denies signs/symptoms. Instructed to notify PAT/MD if any changes.

## 2025-04-23 ENCOUNTER — OFFICE VISIT (OUTPATIENT)
Dept: HEMATOLOGY/ONCOLOGY | Facility: CLINIC | Age: 70
End: 2025-04-23
Payer: MEDICARE

## 2025-04-23 ENCOUNTER — INFUSION (OUTPATIENT)
Dept: INFUSION THERAPY | Facility: HOSPITAL | Age: 70
End: 2025-04-23
Attending: INTERNAL MEDICINE
Payer: MEDICARE

## 2025-04-23 ENCOUNTER — TELEPHONE (OUTPATIENT)
Dept: PAIN MEDICINE | Facility: CLINIC | Age: 70
End: 2025-04-23
Payer: MEDICARE

## 2025-04-23 VITALS
OXYGEN SATURATION: 100 % | TEMPERATURE: 98 F | RESPIRATION RATE: 18 BRPM | BODY MASS INDEX: 26.88 KG/M2 | DIASTOLIC BLOOD PRESSURE: 96 MMHG | WEIGHT: 146.06 LBS | DIASTOLIC BLOOD PRESSURE: 99 MMHG | HEART RATE: 86 BPM | BODY MASS INDEX: 26.88 KG/M2 | HEART RATE: 86 BPM | SYSTOLIC BLOOD PRESSURE: 177 MMHG | SYSTOLIC BLOOD PRESSURE: 171 MMHG | TEMPERATURE: 98 F | HEIGHT: 62 IN | WEIGHT: 146.06 LBS | OXYGEN SATURATION: 100 % | HEIGHT: 62 IN

## 2025-04-23 DIAGNOSIS — D63.1 ANEMIA ASSOCIATED WITH STAGE 5 CHRONIC RENAL FAILURE: Primary | Chronic | ICD-10-CM

## 2025-04-23 DIAGNOSIS — N18.5 ANEMIA ASSOCIATED WITH STAGE 5 CHRONIC RENAL FAILURE: Primary | Chronic | ICD-10-CM

## 2025-04-23 DIAGNOSIS — D05.12 DUCTAL CARCINOMA IN SITU (DCIS) OF LEFT BREAST: ICD-10-CM

## 2025-04-23 PROCEDURE — 99999 PR PBB SHADOW E&M-EST. PATIENT-LVL IV: CPT | Mod: PBBFAC,HCNC,, | Performed by: NURSE PRACTITIONER

## 2025-04-23 NOTE — TELEPHONE ENCOUNTER
----- Message from Lauren sent at 4/23/2025  1:34 PM CDT -----  Name of Who is Calling: Pt  What is the request in detail:Pt would like a call back in regards to which location procedure is at on 4/24/25. Please advise thank you   Can the clinic reply by [facility]:no  What Number to Call Back if not in [facility]:Telephone Information:Mobile          [phone]

## 2025-04-23 NOTE — NURSING
Patient to be rescheduled due to high blood pressure.Yudith Mendoza NP said to let her know that she would have her recheduled for 4 weeks instead of 6. Patient acknowledged this and I gave her a discharge summary with the information on it.

## 2025-04-23 NOTE — PROGRESS NOTES
Subjective:       Patient ID: Nadia Damon is a 69 y.o. female.    Chief Complaint: review labs. Consideration of Retacrit    HPI: 69 y.o female with h/o of CVA 2024, heart transplant in  (on anti-rejection medication), CKD V, triple negative breast ca with lymph node involovement. Initiation of chemotherapy 2021 (Taxotere/Cytoxan) with Udenyca 2021. Unfortunately chemotherapy was complicated by pancytopenia and neutropenic fever resulting in hospitalization x 2. Patient decided on no further chemotherapy. S/p radiation completed 2022    10/31/2022 underwent right breast biopsy due to abnormal findings on mammogram with benign pathology. She continues follow up with breast cancer survivorship     Today she is here for lab review and consideration for EPO therapy for anemia of CKD. She notes planning dialysis shunt soon.  Social History     Socioeconomic History    Marital status: Single    Number of children: 2    Highest education level: 11th grade   Occupational History    Occupation: Retired   Tobacco Use    Smoking status: Never     Passive exposure: Never    Smokeless tobacco: Never   Substance and Sexual Activity    Alcohol use: Never     Alcohol/week: 0.0 standard drinks of alcohol    Drug use: No    Sexual activity: Not Currently     Partners: Male     Birth control/protection: See Surgical Hx   Other Topics Concern    Are you pregnant or think you may be? No    Breast-feeding No   Social History Narrative    Single. 2 children , 1  at 30 yoa  2014 strep throat -  pneumonia and renal complications after not completing course of AB. Other child lives in [place], [place]. Has a cousin locally that could help in an emergency. 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 Caffeine intake =- 1 cola a day. No coffee, + occasional tea, avoids caffeine especially at night. Still drives. She does not have a Living Will or Advanced directive.      Social  Drivers of Health     Financial Resource Strain: Low Risk  (1/7/2025)    Overall Financial Resource Strain (CARDIA)     Difficulty of Paying Living Expenses: Not hard at all   Food Insecurity: No Food Insecurity (1/7/2025)    Hunger Vital Sign     Worried About Running Out of Food in the Last Year: Never true     Ran Out of Food in the Last Year: Never true   Recent Concern: Food Insecurity - Food Insecurity Present (11/13/2024)    Hunger Vital Sign     Worried About Running Out of Food in the Last Year: Never true     Ran Out of Food in the Last Year: Sometimes true   Transportation Needs: No Transportation Needs (1/7/2025)    PRAPARE - Transportation     Lack of Transportation (Medical): No     Lack of Transportation (Non-Medical): No   Physical Activity: Inactive (1/7/2025)    Exercise Vital Sign     Days of Exercise per Week: 0 days     Minutes of Exercise per Session: 0 min   Stress: Stress Concern Present (1/7/2025)    [employer] - Occupational Stress Questionnaire     Feeling of Stress : To some extent   Housing Stability: Unknown (1/7/2025)    Housing Stability Vital Sign     Unable to Pay for Housing in the Last Year: No     Homeless in the Last Year: No       Past Medical History:   Diagnosis Date    Abdominal wall hernia     CT Renal 6/11/2018---Small fat containing superior ventral abdominal wall hernia at the epicardial pacing lead site.    Abnormal mammogram 10/12/2021    Acute cystitis without hematuria 05/10/2022    Acute ischemic right middle cerebral artery (MCA) stroke 07/20/2024    Adverse drug reaction 11/12/2024    GRACE (acute kidney injury) 11/22/2021    Anxiety     Aphasia 07/19/2024    Arthritis     ZEN HIPS    Breast cancer in female 08/2021    LEFT BREAST    Breast mass, right 11/13/2024    RIGHT BREAST MASS (Problem)      Bronchitis 08/18/2016    Never smoked      C. difficile colitis 11/29/2021    Cellulitis of axilla, left  12/23/2021    Chronic diastolic heart failure 12/16/2021    Chronic kidney disease     stage 4, GFR 15-29 ml/min    Chronic midline low back pain without sciatica 06/18/2018    CKD (chronic kidney disease) stage 4, GFR 15-29 ml/min 04/20/2016    US Retro   5/16/2018---Mild cortical thinning and increased cortical echogenicity.  Findings can be seen with medical renal disease.  8/31/216--- Echogenic kidneys with diffuse cortical thinning suggesting  medical renal disease. 2 complex right renal cortical cysts.      Closed nondisplaced fracture of distal phalanx of left great toe with routine healing 10/22/2018    Coronary artery disease 1993    heart transplant    COVID-19 in immunocompromised patient 02/26/2024    Cystitis 05/10/2022    Depression     Encounter for blood transfusion     Encounter for palliative care 10/14/2024    Fibromyalgia     on Lyrica    Heart failure     native heart cardiomyopathy    Heart transplanted 1993    due to cardiomyopathy    History of hyperparathyroidism; Hyperparathyroidism, secondary renal     PT DENIES    Hypertension     Immune disorder     anti rejection meds    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    Impaired mobility 07/28/2022    Iron deficiency anemia 08/15/2017    Kidney stones     passed per pt    Obesity     Obesity (BMI 30.0-34.9) 07/22/2019    Other osteoporosis without current pathological fracture 08/30/2019    Other pancytopenia 05/06/2024    Parotitis, acute 09/19/2023    Pharyngitis 01/09/2019    Pneumonia due to infectious organism 03/14/2024    PONV (postoperative nausea and vomiting)     Severe sepsis 11/22/2021    Shingles 2003 approx    left leg    Stage 4 chronic kidney disease 11/22/2021    Subclinical hypothyroidism 06/16/2023    Thrombocytopenia, unspecified 11/29/2021    Trouble in sleeping     Unresponsiveness 11/13/2024    Urinary incontinence        Family History   Problem Relation Name Age  of Onset    Cancer Mother  38        breast    Breast cancer Mother      Breast cancer Maternal Grandmother      Heart disease Maternal Grandmother      Hypertension Son      Cataracts Cousin      Diabetes Neg Hx      Stroke Neg Hx      Kidney disease Neg Hx      Asthma Neg Hx      COPD Neg Hx      Melanoma Neg Hx      Hyperlipidemia Neg Hx         Past Surgical History:   Procedure Laterality Date    bladder implant Right 06/11/2024    BLADDER SURGERY  2015 approx    mesh - Dr Everett then 2nd reconstructive sx Dr Onofre    BREAST BIOPSY Bilateral     NEGATIVE    BREAST BIOPSY Right 10/31/2022    benign    BREAST LUMPECTOMY Left 2021    BREAST SURGERY Left 09/28/2015    Bx - benign    BREAST SURGERY Right 12/2015    Bx benign    CARDIAC PACEMAKER REMOVAL Left 06/26/2014    Pacer defirillator removed. Put in 1993 aat time of heart transplant    CARPAL TUNNEL RELEASE Left 03/03/2015    Dr. Hall    COLONOSCOPY N/A 02/25/2021    Procedure: COLONOSCOPY;  Surgeon: Freida Ramirez MD;  Location: [facility];  Service: Endoscopy;  Laterality: N/A;    CYSTOCELE REPAIR      Twice with mesh removal    EPIDURAL STEROID INJECTION INTO CERVICAL SPINE N/A 02/02/2023    Procedure: T11/T12 IL HELLEN;  Surgeon: Jassi Pierre MD;  Location: [facility];  Service: Pain Management;  Laterality: N/A;    EPIDURAL STEROID INJECTION INTO CERVICAL SPINE N/A 9/16/2024    Procedure: T11/T12 IL HELLEN;  Surgeon: Jassi Pierre MD;  Location: [facility];  Service: Pain Management;  Laterality: N/A;    HEART TRANSPLANT  1993    HERNIA REPAIR Right 1971 approx    Inguinal    HYSTERECTOMY  1983    vag hyst /LSO     IMPLANTATION OF PERMANENT SACRAL NERVE STIMULATOR N/A 06/11/2024    Procedure: INSERTION, NEUROSTIMULATOR, PERMANENT, SACRAL;  Surgeon: Sami Cochran MD;  [facility];  Service: Urology;  Laterality: N/A;    INCISION AND DRAINAGE OF ABSCESS Left 12/24/2021    Procedure: INCISION AND  DRAINAGE, ABSCESS;  Surgeon: Joseph Longo MD;  Location: [facility] OR;  Service: General;  Laterality: Left;    INJECTION OF ANESTHETIC AGENT AROUND MEDIAL BRANCH NERVES INNERVATING LUMBAR FACET JOINT Right 10/19/2022    Procedure: Right L4/L5 and L5/S1 MBB;  Surgeon: Jassi Pierre MD;  Location: [facility];  Service: Pain Management;  Laterality: Right;    INJECTION OF ANESTHETIC AGENT AROUND MEDIAL BRANCH NERVES INNERVATING LUMBAR FACET JOINT Right 11/09/2022    Procedure: Right L4/L5 and L5/S1 MBB;  Surgeon: Jassi Pierre MD;  Location: [facility];  Service: Pain Management;  Laterality: Right;    INJECTION OF ANESTHETIC AGENT AROUND MEDIAL BRANCH NERVES INNERVATING LUMBAR FACET JOINT Left 2/6/2025    Procedure: Left L4-5 and L5-S1 MBB #1 with local;  Surgeon: Jassi Pierre MD;  Location: [facility];  Service: Pain Management;  Laterality: Left;    INJECTION OF ANESTHETIC AGENT AROUND MEDIAL BRANCH NERVES INNERVATING LUMBAR FACET JOINT Left 3/19/2025    Procedure: Left L4-5 and L5-S1 MBB #2 with local;  Surgeon: Jassi Pierre MD;  Location: [facility];  Service: Pain Management;  Laterality: Left;    INJECTION OF ANESTHETIC AGENT INTO SACROILIAC JOINT Right 08/22/2022    Procedure: Right SIJ Injection Right L5/S1 Facte Injection;  Surgeon: Jassi Pierre MD;  Location: [facility];  Service: Pain Management;  Laterality: Right;    INSERTION OF TUNNELED CENTRAL VENOUS CATHETER (CVC) WITH SUBCUTANEOUS PORT N/A 11/09/2021    Procedure: [id number];  Surgeon: Christoph Douglas MD;  Location: [facility] OR;  Service: General;  Laterality: N/A;    RADIOFREQUENCY ABLATION Right 12/5/2024    Procedure: Right L4-5 and L5-S1 RFA;  Surgeon: Jassi Pierre MD;  Location: [facility];  Service: Pain Management;  Laterality: Right;    RADIOFREQUENCY THERMOCOAGULATION Right 12/07/2022    Procedure: Right L4/L5 and L5/S1 Lumbar RFA;  Surgeon: Jassi Pierre MD;  Location: [facility]  PAIN [facility]GT;  Service: Pain Management;  Laterality: Right;    REMOVAL OF ELECTRODE LEAD OF SACRAL NERVE STIMULATOR N/A 2/18/2025    Procedure: REMOVAL, ELECTRODE LEAD, SACRAL NERVE STIMULATOR;  Surgeon: Sami Cochran MD;  Location: [facility] OR;  Service: Urology;  Laterality: N/A;    REMOVAL OF VASCULAR ACCESS PORT      ROBOT-ASSISTED LAPAROSCOPIC ABDOMINAL SACROCOLPOPEXY N/A 08/10/2023    Procedure: ROBOTIC SACROCOLPOPEXY, ABDOMEN;  Surgeon: PRANAY Villalobos MD;  Location: [facility] OR;  Service: OB/GYN;  Laterality: N/A;    ROBOT-ASSISTED LAPAROSCOPIC OOPHORECTOMY Right 08/10/2023    Procedure: ROBOTIC OOPHORECTOMY;  Surgeon: PRANAY Villalobos MD;  Location: [facility] OR;  Service: OB/GYN;  Laterality: Right;    SENTINEL LYMPH NODE BIOPSY Left 10/12/2021    Procedure: BIOPSY, LYMPH NODE, SENTINEL;  Surgeon: Christoph Douglas MD;  Location: [facility] OR;  Service: General;  Laterality: Left;    TOE SURGERY      XI ROBOTIC URETHROPEXY N/A 08/10/2023    Procedure: XI ROBOTIC URETHROPEXY;  Surgeon: PRANAY Villalobos MD;  Location: [facility] OR;  Service: OB/GYN;  Laterality: N/A;       Review of Systems   Constitutional:  Negative for activity change, appetite change, chills, fatigue, fever and unexpected weight change.   HENT:  Negative for congestion, mouth sores, nosebleeds, sore throat, trouble swallowing and voice change.    Eyes:  Negative for visual disturbance.   Respiratory:  Negative for cough, chest tightness, shortness of breath and wheezing.    Cardiovascular:  Negative for chest pain, palpitations and leg swelling.   Gastrointestinal:  Negative for abdominal distention, abdominal pain, anal bleeding, blood in stool, constipation, diarrhea, nausea and vomiting.   Genitourinary:  Negative for difficulty urinating, dysuria and hematuria.   Musculoskeletal:  Positive for back pain (sees pain management). Negative for arthralgias and myalgias. Gait problem: utizlies cane, s/p CVA 7/2024.       Breast pain s/p  mammogram   Skin:  Negative for pallor, rash and wound.   Neurological:  Negative for dizziness, syncope, weakness and headaches.   Hematological:  Negative for adenopathy. Does not bruise/bleed easily.   Psychiatric/Behavioral:  The patient is nervous/anxious.          Medication List with Changes/Refills   Current Medications    ASPIRIN (ECOTRIN) 81 MG EC TABLET    Take 1 tablet (81 mg total) by mouth once daily.    CALCITRIOL (ROCALTROL) 0.25 MCG CAP    Take 1 capsule (0.25 mcg total) by mouth once daily.    CARVEDILOL (COREG) 3.125 MG TABLET    Take 1 tablet (3.125 mg total) by mouth 2 (two) times daily with meals.    CYCLOSPORINE MODIFIED, NEORAL, (NEORAL) 25 MG CAPSULE    Take 3 capsules (75 mg total) by mouth 2 (two) times daily.    FUROSEMIDE (LASIX) 40 MG TABLET    Take 1 tablet (40 mg total) by mouth once daily.    MUPIROCIN (BACTROBAN) 2 % OINTMENT    Apply topically 2 (two) times daily.    NIFEDIPINE (PROCARDIA-XL) 60 MG (OSM) 24 HR TABLET    Take 1 tablet (60 mg total) by mouth once daily.    NITROGLYCERIN (NITROSTAT) 0.4 MG SL TABLET    PLACE 1 TABLET UNDER THE TONGUE EVERY 5 MINS AS NEEDED FOR CHEST PAIN FOR UP TO 3 DOSES.IF CONTINUED HEART PAIN/PRESSURE AFTER    ONDANSETRON (ZOFRAN) 4 MG TABLET    Take 1 tablet (4 mg total) by mouth every 12 (twelve) hours as needed for Nausea.    OXYCODONE-ACETAMINOPHEN (PERCOCET) 5-325 MG PER TABLET    Take 1 tablet by mouth every 4 (four) hours as needed for Pain.    PANTOPRAZOLE (PROTONIX) 20 MG TABLET    TAKE 1 TABLET EVERY DAY    PATIROMER CALCIUM SORBITEX (VELTASSA) 8.4 GRAM PWPK    Take 1 packet (8.4 g total) by mouth once daily    PATIROMER CALCIUM SORBITEX (VELTASSA) 8.4 GRAM PWPK    Take 1 packet (8.4 g total) by mouth once daily.    POLYETHYLENE GLYCOL (GLYCOLAX) 17 GRAM PWPK    Take 17 g by mouth once daily.    PREGABALIN (LYRICA) 50 MG CAPSULE    TAKE 1 CAPSULE TWICE DAILY    SERTRALINE (ZOLOFT) 25 MG TABLET    Take 1 tablet (25 mg total) by mouth once  daily.    SODIUM BICARBONATE 650 MG TABLET    Take 1 tablet (650 mg total) by mouth 2 (two) times daily.    TEMAZEPAM (RESTORIL) 30 MG CAPSULE    Take 1 capsule (30 mg total) by mouth nightly as needed for Insomnia.    TIZANIDINE (ZANAFLEX) 2 MG TABLET    Take 2 tablets (4 mg total) by mouth 2 (two) times a day.    VITAMIN E 400 UNIT CAPSULE    Take 400 Units by mouth once daily.     Objective:     Vitals:    04/23/25 1306   BP: (!) 177/99   Pulse: 86   Temp: 98.1 °F (36.7 °C)     Lab Results   Component Value Date    WBC 3.04 (L) 04/16/2025    HGB 9.9 (L) 04/16/2025    HCT 30.6 (L) 04/16/2025    MCV 92 04/16/2025     04/16/2025       BMP  Lab Results   Component Value Date     03/25/2025    K 4.1 03/25/2025     03/25/2025    CO2 20 (L) 03/25/2025    BUN 38 (H) 03/25/2025    CREATININE 3.2 (H) 03/25/2025    CALCIUM 9.3 03/25/2025    ANIONGAP 16 03/25/2025    ESTGFRAFRICA 19 (A) 07/14/2022    EGFRNONAA 17 (A) 07/14/2022     Lab Results   Component Value Date    ALT 28 02/24/2025    AST 42 (H) 02/24/2025    ALKPHOS 120 02/24/2025    BILITOT 0.4 02/24/2025     Lab Results   Component Value Date    IRON 64 02/24/2025    TIBC 317 02/24/2025    FERRITIN 100 02/24/2025       Physical Exam  Vitals reviewed.   Constitutional:       Appearance: She is well-developed.   HENT:      Head: Normocephalic.      Right Ear: External ear normal.      Left Ear: External ear normal.   Eyes:      General: Lids are normal. No scleral icterus.        Right eye: No discharge.         Left eye: No discharge.      Conjunctiva/sclera: Conjunctivae normal.   Neck:      Thyroid: No thyroid mass.   Cardiovascular:      Rate and Rhythm: Normal rate and regular rhythm.      Heart sounds: Normal heart sounds.   Pulmonary:      Effort: Pulmonary effort is normal. No respiratory distress.   Abdominal:      General: Bowel sounds are normal. There is no distension.      Palpations: Abdomen is soft.   Genitourinary:     Comments:  deferred  Musculoskeletal:         General: Normal range of motion.      Cervical back: Normal range of motion.      Comments: Mild BLE edema   Skin:     General: Skin is warm and dry.   Neurological:      Mental Status: She is alert and oriented to person, place, and time.   Psychiatric:         Speech: Speech normal.         Behavior: Behavior normal. Behavior is cooperative.         Thought Content: Thought content normal.        Assessment:     Problem List Items Addressed This Visit          Oncology    Anemia associated with stage 5 chronic renal failure - Primary (Chronic)    Hg 9.9. Most recent iron levels adequate    Would like to proceed with Retacrit today but BP elevated. Hold Retacrit. F/u 4 weeks with CBC iron ferritin for consideration of Retacrit if hg <10         Relevant Orders    CBC Auto Differential    Iron and TIBC    Ferritin    Ductal carcinoma in situ (DCIS) of left breast    Continue f/u with breast cancer survivorship              Plan:     Anemia associated with stage 5 chronic renal failure  -     CBC Auto Differential; Future; Expected date: 04/23/2025  -     Iron and TIBC; Future; Expected date: 04/23/2025  -     Ferritin; Future; Expected date: 04/23/2025    Ductal carcinoma in situ (DCIS) of left breast      Route Chart for Scheduling    Med Onc Chart Routing      Follow up with physician    Follow up with LIA 4 weeks.   Infusion scheduling note    Injection scheduling note retacrit   Labs CBC, ferritin and iron and TIBC   Scheduling:  Preferred lab:  Lab interval:     Imaging None      Pharmacy appointment No pharmacy appointment needed      Other referrals       No additional referrals needed       Therapy Plan Information  INF EPOETIN TRISTAN (RETACRIT) INITIAL DOSES for Other osteoporosis without current pathological fracture, noted on 8/30/2019  INF EPOETIN TRISTAN (RETACRIT) INITIAL DOSES for Anemia associated with stage 5 chronic renal failure, noted on 11/19/2024  epoetin tristan-epbx  injection 20,000 Units  20,000 Units, Subcutaneous, PRN      No therapy plan of the specified type found.    No therapy plan of the specified type found.          JOSE J العراقيC

## 2025-04-23 NOTE — ASSESSMENT & PLAN NOTE
Hg 9.9. Most recent iron levels adequate    Would like to proceed with Retacrit today but BP elevated. Hold Retacrit. F/u 4 weeks with CBC iron ferritin for consideration of Retacrit if hg <10

## 2025-04-23 NOTE — DISCHARGE INSTRUCTIONS
[facility]  [address] [address]  [address]  [phone] phone     [phone] fax  Hours of Operation: Monday- Friday 8:00am- 5:00pm  After hours phone  [phone]  Hematology / Oncology Physicians on call      SALENA Thurman Dr., NP Phaon Dunbar, NP Khelsea Conley, FNP    Please call with any concerns regarding your appointment today.    Per Yudith Mendoza NP will hold retacrit today and reschedule for 4 weeks instead of 6.

## 2025-04-24 ENCOUNTER — HOSPITAL ENCOUNTER (EMERGENCY)
Facility: HOSPITAL | Age: 70
Discharge: ANOTHER HOSPITAL | End: 2025-04-24
Attending: EMERGENCY MEDICINE
Payer: MEDICARE

## 2025-04-24 ENCOUNTER — HOSPITAL ENCOUNTER (INPATIENT)
Facility: HOSPITAL | Age: 70
LOS: 11 days | Discharge: HOME HEALTH | DRG: 302 | End: 2025-05-06
Attending: INTERNAL MEDICINE | Admitting: INTERNAL MEDICINE
Payer: MEDICARE

## 2025-04-24 ENCOUNTER — HOSPITAL ENCOUNTER (OUTPATIENT)
Facility: HOSPITAL | Age: 70
Discharge: OTHER INSTITUTION | End: 2025-04-24
Attending: ANESTHESIOLOGY | Admitting: ANESTHESIOLOGY
Payer: MEDICARE

## 2025-04-24 VITALS
RESPIRATION RATE: 18 BRPM | DIASTOLIC BLOOD PRESSURE: 84 MMHG | OXYGEN SATURATION: 100 % | SYSTOLIC BLOOD PRESSURE: 178 MMHG | TEMPERATURE: 98 F | HEART RATE: 102 BPM

## 2025-04-24 VITALS
RESPIRATION RATE: 15 BRPM | SYSTOLIC BLOOD PRESSURE: 122 MMHG | HEIGHT: 62 IN | HEART RATE: 85 BPM | TEMPERATURE: 98 F | OXYGEN SATURATION: 100 % | DIASTOLIC BLOOD PRESSURE: 69 MMHG | BODY MASS INDEX: 26.86 KG/M2 | WEIGHT: 145.94 LBS

## 2025-04-24 DIAGNOSIS — Z51.5 PALLIATIVE CARE ENCOUNTER: ICD-10-CM

## 2025-04-24 DIAGNOSIS — Z71.89 ACP (ADVANCE CARE PLANNING): ICD-10-CM

## 2025-04-24 DIAGNOSIS — Z94.1 S/P ORTHOTOPIC HEART TRANSPLANT: ICD-10-CM

## 2025-04-24 DIAGNOSIS — I25.758: ICD-10-CM

## 2025-04-24 DIAGNOSIS — M47.816 LUMBAR SPONDYLOSIS: ICD-10-CM

## 2025-04-24 DIAGNOSIS — I46.9 CARDIAC ARREST: ICD-10-CM

## 2025-04-24 DIAGNOSIS — R07.82 INTERCOSTAL PAIN: Primary | ICD-10-CM

## 2025-04-24 DIAGNOSIS — R07.9 CHEST PAIN, UNSPECIFIED TYPE: ICD-10-CM

## 2025-04-24 DIAGNOSIS — R07.9 CHEST PAIN: Primary | ICD-10-CM

## 2025-04-24 DIAGNOSIS — R07.9 CHEST PAIN: ICD-10-CM

## 2025-04-24 DIAGNOSIS — R07.2 PRECORDIAL PAIN: ICD-10-CM

## 2025-04-24 DIAGNOSIS — Z94.1 HEART TRANSPLANTED: ICD-10-CM

## 2025-04-24 DIAGNOSIS — M79.7 FIBROMYALGIA: Chronic | ICD-10-CM

## 2025-04-24 LAB
ABSOLUTE EOSINOPHIL (OHS): 0.1 K/UL
ABSOLUTE MONOCYTE (OHS): 0.44 K/UL (ref 0.3–1)
ABSOLUTE NEUTROPHIL COUNT (OHS): 2.05 K/UL (ref 1.8–7.7)
ALBUMIN SERPL BCP-MCNC: 3.6 G/DL (ref 3.5–5.2)
ALP SERPL-CCNC: 136 UNIT/L (ref 40–150)
ALT SERPL W/O P-5'-P-CCNC: 29 UNIT/L (ref 10–44)
ANION GAP (OHS): 14 MMOL/L (ref 8–16)
AST SERPL-CCNC: 39 UNIT/L (ref 11–45)
BACTERIA #/AREA URNS AUTO: NORMAL /HPF
BASOPHILS # BLD AUTO: 0.02 K/UL
BASOPHILS NFR BLD AUTO: 0.5 %
BILIRUB SERPL-MCNC: 0.5 MG/DL (ref 0.1–1)
BILIRUB UR QL STRIP.AUTO: NEGATIVE
BNP SERPL-MCNC: 582 PG/ML (ref 0–99)
BUN SERPL-MCNC: 36 MG/DL (ref 8–23)
CALCIUM SERPL-MCNC: 8.7 MG/DL (ref 8.7–10.5)
CHLORIDE SERPL-SCNC: 109 MMOL/L (ref 95–110)
CK SERPL-CCNC: 379 U/L (ref 20–180)
CLARITY UR: CLEAR
CO2 SERPL-SCNC: 20 MMOL/L (ref 23–29)
COLOR UR AUTO: YELLOW
CREAT SERPL-MCNC: 3.4 MG/DL (ref 0.5–1.4)
ERYTHROCYTE [DISTWIDTH] IN BLOOD BY AUTOMATED COUNT: 15.5 % (ref 11.5–14.5)
GFR SERPLBLD CREATININE-BSD FMLA CKD-EPI: 14 ML/MIN/1.73/M2
GLUCOSE SERPL-MCNC: 99 MG/DL (ref 70–110)
GLUCOSE UR QL STRIP: NEGATIVE
HCT VFR BLD AUTO: 29.6 % (ref 37–48.5)
HCV AB SERPL QL IA: NEGATIVE
HGB BLD-MCNC: 9.8 GM/DL (ref 12–16)
HGB UR QL STRIP: ABNORMAL
HIV 1+2 AB+HIV1 P24 AG SERPL QL IA: NEGATIVE
HOLD SPECIMEN: NORMAL
HYALINE CASTS UR QL AUTO: 0 /LPF (ref 0–1)
IMM GRANULOCYTES # BLD AUTO: 0.01 K/UL (ref 0–0.04)
IMM GRANULOCYTES NFR BLD AUTO: 0.3 % (ref 0–0.5)
KETONES UR QL STRIP: NEGATIVE
LEUKOCYTE ESTERASE UR QL STRIP: ABNORMAL
LYMPHOCYTES # BLD AUTO: 1.03 K/UL (ref 1–4.8)
MAGNESIUM SERPL-MCNC: 1.5 MG/DL (ref 1.6–2.6)
MCH RBC QN AUTO: 29.7 PG (ref 27–31)
MCHC RBC AUTO-ENTMCNC: 33.1 G/DL (ref 32–36)
MCV RBC AUTO: 90 FL (ref 82–98)
MICROSCOPIC COMMENT: NORMAL
NITRITE UR QL STRIP: NEGATIVE
NUCLEATED RBC (/100WBC) (OHS): 0 /100 WBC
OHS QRS DURATION: 164 MS
OHS QTC CALCULATION: 567 MS
PH UR STRIP: 7 [PH]
PLATELET # BLD AUTO: 171 K/UL (ref 150–450)
PMV BLD AUTO: 10.2 FL (ref 9.2–12.9)
POTASSIUM SERPL-SCNC: 3.3 MMOL/L (ref 3.5–5.1)
PROT SERPL-MCNC: 8.6 GM/DL (ref 6–8.4)
PROT UR QL STRIP: ABNORMAL
RBC # BLD AUTO: 3.3 M/UL (ref 4–5.4)
RBC #/AREA URNS AUTO: 2 /HPF (ref 0–4)
RELATIVE EOSINOPHIL (OHS): 2.7 %
RELATIVE LYMPHOCYTE (OHS): 28.2 % (ref 18–48)
RELATIVE MONOCYTE (OHS): 12.1 % (ref 4–15)
RELATIVE NEUTROPHIL (OHS): 56.2 % (ref 38–73)
SODIUM SERPL-SCNC: 143 MMOL/L (ref 136–145)
SP GR UR STRIP: 1.01
SQUAMOUS #/AREA URNS AUTO: 3 /HPF
TROPONIN I SERPL DL<=0.01 NG/ML-MCNC: 0.03 NG/ML
UROBILINOGEN UR STRIP-ACNC: NEGATIVE EU/DL
WBC # BLD AUTO: 3.65 K/UL (ref 3.9–12.7)
WBC #/AREA URNS AUTO: 4 /HPF (ref 0–5)
YEAST UR QL AUTO: NORMAL /HPF

## 2025-04-24 PROCEDURE — 93010 ELECTROCARDIOGRAM REPORT: CPT | Mod: HCNC,,, | Performed by: INTERNAL MEDICINE

## 2025-04-24 PROCEDURE — 25000003 PHARM REV CODE 250: Mod: HCNC | Performed by: EMERGENCY MEDICINE

## 2025-04-24 PROCEDURE — 63600175 PHARM REV CODE 636 W HCPCS: Mod: HCNC | Performed by: EMERGENCY MEDICINE

## 2025-04-24 PROCEDURE — 96374 THER/PROPH/DIAG INJ IV PUSH: CPT | Mod: HCNC

## 2025-04-24 PROCEDURE — 86803 HEPATITIS C AB TEST: CPT | Mod: HCNC | Performed by: EMERGENCY MEDICINE

## 2025-04-24 PROCEDURE — 96376 TX/PRO/DX INJ SAME DRUG ADON: CPT | Mod: HCNC

## 2025-04-24 PROCEDURE — 82550 ASSAY OF CK (CPK): CPT | Mod: HCNC | Performed by: EMERGENCY MEDICINE

## 2025-04-24 PROCEDURE — 81003 URINALYSIS AUTO W/O SCOPE: CPT | Mod: HCNC | Performed by: EMERGENCY MEDICINE

## 2025-04-24 PROCEDURE — 63600175 PHARM REV CODE 636 W HCPCS: Mod: HCNC | Performed by: ANESTHESIOLOGY

## 2025-04-24 PROCEDURE — 25000003 PHARM REV CODE 250: Mod: HCNC | Performed by: ANESTHESIOLOGY

## 2025-04-24 PROCEDURE — 93005 ELECTROCARDIOGRAM TRACING: CPT

## 2025-04-24 PROCEDURE — 20600001 HC STEP DOWN PRIVATE ROOM: Mod: HCNC

## 2025-04-24 PROCEDURE — 99223 1ST HOSP IP/OBS HIGH 75: CPT | Mod: AI,HCNC,, | Performed by: INTERNAL MEDICINE

## 2025-04-24 PROCEDURE — 87389 HIV-1 AG W/HIV-1&-2 AB AG IA: CPT | Mod: HCNC | Performed by: EMERGENCY MEDICINE

## 2025-04-24 PROCEDURE — 99285 EMERGENCY DEPT VISIT HI MDM: CPT | Mod: 25,HCNC

## 2025-04-24 PROCEDURE — 84484 ASSAY OF TROPONIN QUANT: CPT | Mod: HCNC | Performed by: EMERGENCY MEDICINE

## 2025-04-24 PROCEDURE — 85025 COMPLETE CBC W/AUTO DIFF WBC: CPT | Mod: HCNC | Performed by: EMERGENCY MEDICINE

## 2025-04-24 PROCEDURE — 80053 COMPREHEN METABOLIC PANEL: CPT | Mod: HCNC | Performed by: EMERGENCY MEDICINE

## 2025-04-24 PROCEDURE — 83880 ASSAY OF NATRIURETIC PEPTIDE: CPT | Mod: HCNC | Performed by: EMERGENCY MEDICINE

## 2025-04-24 PROCEDURE — 93005 ELECTROCARDIOGRAM TRACING: CPT | Mod: HCNC

## 2025-04-24 PROCEDURE — 83735 ASSAY OF MAGNESIUM: CPT | Mod: HCNC | Performed by: EMERGENCY MEDICINE

## 2025-04-24 RX ORDER — ASPIRIN 325 MG
325 TABLET, DELAYED RELEASE (ENTERIC COATED) ORAL ONCE
Status: DISCONTINUED | OUTPATIENT
Start: 2025-04-24 | End: 2025-04-24

## 2025-04-24 RX ORDER — ASPIRIN 325 MG
325 TABLET ORAL ONCE
Status: DISCONTINUED | OUTPATIENT
Start: 2025-04-24 | End: 2025-04-24

## 2025-04-24 RX ORDER — NAPROXEN SODIUM 220 MG/1
325 TABLET, FILM COATED ORAL DAILY
Status: DISCONTINUED | OUTPATIENT
Start: 2025-04-24 | End: 2025-04-24 | Stop reason: HOSPADM

## 2025-04-24 RX ORDER — MORPHINE SULFATE 4 MG/ML
4 INJECTION, SOLUTION INTRAMUSCULAR; INTRAVENOUS
Refills: 0 | Status: COMPLETED | OUTPATIENT
Start: 2025-04-24 | End: 2025-04-24

## 2025-04-24 RX ORDER — SODIUM CHLORIDE 9 MG/ML
INJECTION, SOLUTION INTRAVENOUS ONCE
Status: COMPLETED | OUTPATIENT
Start: 2025-04-24 | End: 2025-04-24

## 2025-04-24 RX ORDER — NAPROXEN SODIUM 220 MG/1
TABLET, FILM COATED ORAL
Status: DISCONTINUED
Start: 2025-04-24 | End: 2025-04-24 | Stop reason: WASHOUT

## 2025-04-24 RX ORDER — ONDANSETRON HYDROCHLORIDE 2 MG/ML
4 INJECTION, SOLUTION INTRAVENOUS ONCE AS NEEDED
Status: COMPLETED | OUTPATIENT
Start: 2025-04-24 | End: 2025-04-24

## 2025-04-24 RX ORDER — POTASSIUM CHLORIDE 20 MEQ/1
20 TABLET, EXTENDED RELEASE ORAL
Status: COMPLETED | OUTPATIENT
Start: 2025-04-24 | End: 2025-04-24

## 2025-04-24 RX ADMIN — POTASSIUM CHLORIDE 20 MEQ: 1500 TABLET, EXTENDED RELEASE ORAL at 06:04

## 2025-04-24 RX ADMIN — SODIUM CHLORIDE: 9 INJECTION, SOLUTION INTRAVENOUS at 11:04

## 2025-04-24 RX ADMIN — NITROGLYCERIN 1 INCH: 20 OINTMENT TOPICAL at 06:04

## 2025-04-24 RX ADMIN — MORPHINE SULFATE 4 MG: 4 INJECTION INTRAVENOUS at 07:04

## 2025-04-24 RX ADMIN — ONDANSETRON 4 MG: 2 INJECTION INTRAMUSCULAR; INTRAVENOUS at 11:04

## 2025-04-24 RX ADMIN — MORPHINE SULFATE 4 MG: 4 INJECTION INTRAVENOUS at 02:04

## 2025-04-24 NOTE — PROGRESS NOTES
Dr. Pierre order transfer to ER based on EKG. Notified charge nurse. Charge arranging transportation. Supportive care to patient with distractions and calm conversations. Dr. Pierre advised okay to take home nitroglycerin 1147. Patient put on 2L oxygen nasal cannula.

## 2025-04-24 NOTE — PLAN OF CARE
Patient being prepared for procedure. Patient has complaints of nausea before starting IV and stated she took medications without eating today. Obtained IV and gave zofran. Patient started vomiting and stated she took nitroglycerin this morning after having chest pain that spread to back and arm. Dr. Pierre notified. Dr ordered IV fluids and EKG. Patient states feeling weak, dizzy and nauseous.

## 2025-04-24 NOTE — DISCHARGE INSTRUCTIONS

## 2025-04-24 NOTE — H&P
Upon arrival to do preprocedure area, patient reports chest pain, vasovagal symptoms, and nausea after starting IV.  Patient does report that she had to use her nitroglycerin sublingual this morning at a.m..  Patient reports that over the last 2 weeks she has had to use her nitroglycerin more at this time.  At this time, the patient was placed on oxygen, a triple aspirin was given, and an EKG was obtained.  Updated EKG reveals repolarization abnormality in L4, and L5.  Patient will be transferred to emergency department for further evaluation.

## 2025-04-24 NOTE — ED PROVIDER NOTES
SCRIBE #1 NOTE: IRonnie, am scribing for, and in the presence of, Larry Pittman MD. I have scribed the HPI, ROS, and PE.    SCRIBE #2 NOTE: IWestley, am scribing for, and in the presence of,  Cedric Cardenas DO. I have scribed the remaining portions of the note not scribed by Scribe #1.      History     Chief Complaint   Patient presents with    Nausea    Vomiting    Chest Pain     Was at the [facility] for a pain procedure and was nauseated from taking her home meds on an empty stomach and started vomiting. Was given Zofran, and also stated she took one of her Nitro rosaura to chest pain radiating to her back. Patient with hx heart transplant and per the MD at the [facility], had EKG changes   Patient upset she is here and wants to go back for her procedure      Review of patient's allergies indicates:   Allergen Reactions    Dobutamine Other (See Comments)     Severe Left sided chest pain after dosage administered for Dobutamine Stress Test; itching    Lisinopril Swelling and Rash    Hydrocodone-acetaminophen Nausea Only    Augmentin [amoxicillin-pot clavulanate] Diarrhea    Zyvox [linezolid] Nausea And Vomiting         History of Present Illness     HPI    4/24/2025, 1:19 PM  History obtained from the patient and medical records      History of Present Illness: Nadia Damon is a 69 y.o. female patient with a PMHx of HTN, heart transplant 32 years ago, GRACE, CKD, anxiety, depression, fibromyalgia, breast cancer, CHF, and thrombocytopenia who presents to the Emergency Department for evaluation of CP which onset within the past couple of days. Pt has some nausea and vomiting. The pt was sent here due to EKG changes by MD at the [facility]. Pt took all her usual medications and NTG this morning. Symptoms are intermittent and moderate in severity. No mitigating or exacerbating factors reported. Patient denies any SOB, fever, dizziness, abd pain, and all other sxs at this time. No further complaints or  concerns at this time.       Arrival mode: Ambulance Service    PCP: Elis Wick MD        Past Medical History:  Past Medical History:   Diagnosis Date    Abdominal wall hernia     CT Renal 6/11/2018---Small fat containing superior ventral abdominal wall hernia at the epicardial pacing lead site.    Abnormal mammogram 10/12/2021    Acute cystitis without hematuria 05/10/2022    Acute ischemic right middle cerebral artery (MCA) stroke 07/20/2024    Adverse drug reaction 11/12/2024    GRACE (acute kidney injury) 11/22/2021    Anxiety     Aphasia 07/19/2024    Arthritis     ZEN HIPS    Breast cancer in female 08/2021    LEFT BREAST    Breast mass, right 11/13/2024    RIGHT BREAST MASS (Problem)      Bronchitis 08/18/2016    Never smoked      C. difficile colitis 11/29/2021    Cellulitis of axilla, left 12/23/2021    Chronic diastolic heart failure 12/16/2021    Chronic kidney disease     stage 4, GFR 15-29 ml/min    Chronic midline low back pain without sciatica 06/18/2018    CKD (chronic kidney disease) stage 4, GFR 15-29 ml/min 04/20/2016    US Retro   5/16/2018---Mild cortical thinning and increased cortical echogenicity.  Findings can be seen with medical renal disease.  8/31/216--- Echogenic kidneys with diffuse cortical thinning suggesting  medical renal disease. 2 complex right renal cortical cysts.      Closed nondisplaced fracture of distal phalanx of left great toe with routine healing 10/22/2018    Coronary artery disease 1993    heart transplant    COVID-19 in immunocompromised patient 02/26/2024    Cystitis 05/10/2022    Depression     Encounter for blood transfusion     Encounter for palliative care 10/14/2024    Fibromyalgia     on Lyrica    Heart failure     native heart cardiomyopathy    Heart transplanted 1993    due to cardiomyopathy    History of hyperparathyroidism; Hyperparathyroidism, secondary renal     PT DENIES    Hypertension     Immune disorder     anti rejection meds     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    Impaired mobility 07/28/2022    Iron deficiency anemia 08/15/2017    Kidney stones     passed per pt    Obesity     Obesity (BMI 30.0-34.9) 07/22/2019    Other osteoporosis without current pathological fracture 08/30/2019    Other pancytopenia 05/06/2024    Parotitis, acute 09/19/2023    Pharyngitis 01/09/2019    Pneumonia due to infectious organism 03/14/2024    PONV (postoperative nausea and vomiting)     Severe sepsis 11/22/2021    Shingles 2003 approx    left leg    Stage 4 chronic kidney disease 11/22/2021    Subclinical hypothyroidism 06/16/2023    Thrombocytopenia, unspecified 11/29/2021    Trouble in sleeping     Unresponsiveness 11/13/2024    Urinary incontinence        Past Surgical History:  Past Surgical History:   Procedure Laterality Date    bladder implant Right 06/11/2024    BLADDER SURGERY  2015 approx    mesh - Dr Everett then 2nd reconstructive sx Dr Onofre    BREAST BIOPSY Bilateral     NEGATIVE    BREAST BIOPSY Right 10/31/2022    benign    BREAST LUMPECTOMY Left 2021    BREAST SURGERY Left 09/28/2015    Bx - benign    BREAST SURGERY Right 12/2015    Bx benign    CARDIAC PACEMAKER REMOVAL Left 06/26/2014    Pacer defirillator removed. Put in 1993 aat time of heart transplant    CARPAL TUNNEL RELEASE Left 03/03/2015    Dr. Hall    COLONOSCOPY N/A 02/25/2021    Procedure: COLONOSCOPY;  Surgeon: Freida Ramirez MD;  Location: [facility];  Service: Endoscopy;  Laterality: N/A;    CYSTOCELE REPAIR      Twice with mesh removal    EPIDURAL STEROID INJECTION INTO CERVICAL SPINE N/A 02/02/2023    Procedure: T11/T12 IL HELLEN;  Surgeon: Jassi Pierre MD;  Location: [facility] PAIN MGT;  Service: Pain Management;  Laterality: N/A;    EPIDURAL STEROID INJECTION INTO CERVICAL SPINE N/A 9/16/2024    Procedure: T11/T12 IL HELLEN;  Surgeon: Jassi Pierre MD;  Location: [facility]IN [facility]GT;  Service: Pain Management;  Laterality: N/A;    HEART TRANSPLANT   1993    HERNIA REPAIR Right 1971 approx    Inguinal    HYSTERECTOMY  1983    vag hyst /LSO     IMPLANTATION OF PERMANENT SACRAL NERVE STIMULATOR N/A 06/11/2024    Procedure: INSERTION, NEUROSTIMULATOR, PERMANENT, SACRAL;  Surgeon: Sami Cochran MD;  Location: [facility] OR;  Service: Urology;  Laterality: N/A;    INCISION AND DRAINAGE OF ABSCESS Left 12/24/2021    Procedure: INCISION AND DRAINAGE, ABSCESS;  Surgeon: Joseph Longo MD;  Location: [facility] OR;  Service: General;  Laterality: Left;    INJECTION OF ANESTHETIC AGENT AROUND MEDIAL BRANCH NERVES INNERVATING LUMBAR FACET JOINT Right 10/19/2022    Procedure: Right L4/L5 and L5/S1 MBB;  Surgeon: Jassi Pierre MD;  Location: [facility] PAIN [facility]GT;  Service: Pain Management;  Laterality: Right;    INJECTION OF ANESTHETIC AGENT AROUND MEDIAL BRANCH NERVES INNERVATING LUMBAR FACET JOINT Right 11/09/2022    Procedure: Right L4/L5 and L5/S1 MBB;  Surgeon: Jassi Pierre MD;  Location: [facility] PAIN [facility]GT;  Service: Pain Management;  Laterality: Right;    INJECTION OF ANESTHETIC AGENT AROUND MEDIAL BRANCH NERVES INNERVATING LUMBAR FACET JOINT Left 2/6/2025    Procedure: Left L4-5 and L5-S1 MBB #1 with local;  Surgeon: Jassi Pierre MD;  Location: [facility]IN [facility]GT;  Service: Pain Management;  Laterality: Left;    INJECTION OF ANESTHETIC AGENT AROUND MEDIAL BRANCH NERVES INNERVATING LUMBAR FACET JOINT Left 3/19/2025    Procedure: Left L4-5 and L5-S1 MBB #2 with local;  Surgeon: Jassi Pierre MD;  Location: [facility] PAIN [facility]GT;  Service: Pain Management;  Laterality: Left;    INJECTION OF ANESTHETIC AGENT INTO SACROILIAC JOINT Right 08/22/2022    Procedure: Right SIJ Injection Right L5/S1 Facte Injection;  Surgeon: Jassi Pierre MD;  Location: [facility]IN [facility]GT;  Service: Pain Management;  Laterality: Right;    INSERTION OF TUNNELED CENTRAL VENOUS CATHETER (CVC) WITH SUBCUTANEOUS PORT N/A 11/09/2021    Procedure: [id number];  Surgeon: Christoph Douglas MD;  Location:  [facility] OR;  Service: General;  Laterality: N/A;    RADIOFREQUENCY ABLATION Right 12/5/2024    Procedure: Right L4-5 and L5-S1 RFA;  Surgeon: Jassi Pierre MD;  Location: [facility] PAIN [facility]GT;  Service: Pain Management;  Laterality: Right;    RADIOFREQUENCY THERMOCOAGULATION Right 12/07/2022    Procedure: Right L4/L5 and L5/S1 Lumbar RFA;  Surgeon: Jassi Pierre MD;  Location: [facility] PAIN [facility]GT;  Service: Pain Management;  Laterality: Right;    REMOVAL OF ELECTRODE LEAD OF SACRAL NERVE STIMULATOR N/A 2/18/2025    Procedure: REMOVAL, ELECTRODE LEAD, SACRAL NERVE STIMULATOR;  Surgeon: Sami Cochran MD;  Location: [facility] OR;  Service: Urology;  Laterality: N/A;    REMOVAL OF VASCULAR ACCESS PORT      ROBOT-ASSISTED LAPAROSCOPIC ABDOMINAL SACROCOLPOPEXY N/A 08/10/2023    Procedure: ROBOTIC SACROCOLPOPEXY, ABDOMEN;  Surgeon: PRANAY Villalobos MD;  Location: [facility] OR;  Service: OB/GYN;  Laterality: N/A;    ROBOT-ASSISTED LAPAROSCOPIC OOPHORECTOMY Right 08/10/2023    Procedure: ROBOTIC OOPHORECTOMY;  Surgeon: PRANAY Villalobos MD;  Location: [facility] OR;  Service: OB/GYN;  Laterality: Right;    SENTINEL LYMPH NODE BIOPSY Left 10/12/2021    Procedure: BIOPSY, LYMPH NODE, SENTINEL;  Surgeon: Christoph Douglas MD;  Location: [facility] OR;  Service: General;  Laterality: Left;    TOE SURGERY      XI ROBOTIC URETHROPEXY N/A 08/10/2023    Procedure: XI ROBOTIC URETHROPEXY;  Surgeon: PRANAY Villalobos MD;  Location: [facility] OR;  Service: OB/GYN;  Laterality: N/A;         Family History:  Family History   Problem Relation Name Age of Onset    Cancer Mother  38        breast    Breast cancer Mother      Breast cancer Maternal Grandmother      Heart disease Maternal Grandmother      Hypertension Son      Cataracts Cousin      Diabetes Neg Hx      Stroke Neg Hx      Kidney disease Neg Hx      Asthma Neg Hx      COPD Neg Hx      Melanoma Neg Hx      Hyperlipidemia Neg Hx         Social History:  Social History     Tobacco Use    Smoking  status: Never     Passive exposure: Never    Smokeless tobacco: Never   Substance and Sexual Activity    Alcohol use: Never     Alcohol/week: 0.0 standard drinks of alcohol    Drug use: No    Sexual activity: Not Currently     Partners: Male     Birth control/protection: See Surgical Hx        Review of Systems     Review of Systems   Constitutional:  Negative for chills and fever.   HENT:  Negative for congestion and sore throat.    Respiratory:  Negative for cough and shortness of breath.    Cardiovascular:  Positive for chest pain.   Gastrointestinal:  Positive for nausea and vomiting. Negative for abdominal pain.   Genitourinary:  Negative for dysuria.   Musculoskeletal:  Negative for back pain.   Skin:  Negative for rash.   Neurological:  Negative for dizziness, weakness, light-headedness, numbness and headaches.   Hematological:  Does not bruise/bleed easily.   All other systems reviewed and are negative.       Physical Exam     Initial Vitals   BP Pulse Resp Temp SpO2   04/24/25 1248 04/24/25 1248 04/24/25 1248 04/24/25 1248 04/24/25 1340   114/83 72 18 98.1 °F (36.7 °C) 100 %      MAP       --                 Physical Exam  Nursing Notes and Vital Signs Reviewed.  Constitutional: Patient is in no acute distress. Well-developed and well-nourished.  Head: Atraumatic. Normocephalic.  Eyes: PERRL. EOM intact. Conjunctivae are not pale. No scleral icterus.  ENT: Mucous membranes are moist. Oropharynx is clear and symmetric.    Neck: Supple. Full ROM. No lymphadenopathy.  Cardiovascular: Regular rate. Regular rhythm. Murmur. Distal pulses are 2+ and symmetric.  Pulmonary/Chest: No respiratory distress. Clear to auscultation bilaterally. No wheezing or rales.  Abdominal: Soft and non-distended.  There is no tenderness.  No rebound, guarding, or rigidity. Good bowel sounds.  Genitourinary: No CVA tenderness.  Musculoskeletal: Moves all extremities. No obvious deformities. No edema. No calf tenderness.  Skin: Warm  and dry.  Neurological:  Alert, awake, and appropriate.  Normal speech.  No acute focal neurological deficits are appreciated.  Psychiatric: Normal affect. Good eye contact. Appropriate in content.       ED Course   Critical Care    Date/Time: 4/24/2025 5:00 PM    Performed by: Cedric Cardenas DO  Authorized by: Cedric Cardenas DO  Direct patient critical care time: 20 minutes  Ordering / reviewing critical care time: 5 minutes  Documentation critical care time: 5 minutes  Consulting other physicians critical care time: 5 minutes  Total critical care time (exclusive of procedural time) : 35 minutes  Critical care time was exclusive of separately billable procedures and treating other patients.  Critical care was necessary to treat or prevent imminent or life-threatening deterioration of the following conditions: Unstable angina in patient with history of heart transplant.  Critical care was time spent personally by me on the following activities: discussions with consultants, interpretation of cardiac output measurements, evaluation of patient's response to treatment, examination of patient, obtaining history from patient or surrogate, ordering and performing treatments and interventions, ordering and review of laboratory studies, pulse oximetry, ordering and review of radiographic studies, re-evaluation of patient's condition, review of old charts and development of treatment plan with patient or surrogate.        ED Vital Signs:  Vitals:    04/24/25 1248 04/24/25 1340 04/24/25 1445 04/24/25 1455   BP: 114/83 136/79  137/74   Pulse: 72 86  96   Resp: 18 (!) 22 18 17   Temp: 98.1 °F (36.7 °C)      TempSrc: Oral      SpO2:  100%  100%    04/24/25 1740 04/24/25 1830 04/24/25 1900 04/24/25 1917   BP: (!) 172/87  (!) 178/84    Pulse: 100 104 97 102   Resp: (!) 29 (!) 21 (!) 26 18   Temp: 98.2 °F (36.8 °C)  98 °F (36.7 °C) 98 °F (36.7 °C)   TempSrc: Oral   Oral   SpO2: 96% 100% 100% 100%       Abnormal Lab  Results:  Labs Reviewed   COMPREHENSIVE METABOLIC PANEL - Abnormal       Result Value    Sodium 143      Potassium 3.3 (*)     Chloride 109      CO2 20 (*)     Glucose 99      BUN 36 (*)     Creatinine 3.4 (*)     Calcium 8.7      Protein Total 8.6 (*)     Albumin 3.6      Bilirubin Total 0.5            AST 39      ALT 29      Anion Gap 14      eGFR 14 (*)    URINALYSIS, REFLEX TO URINE CULTURE - Abnormal    Color, UA Yellow      Appearance, UA Clear      pH, UA 7.0      Spec Grav UA 1.010      Protein, UA 2+ (*)     Glucose, UA Negative      Ketones, UA Negative      Bilirubin, UA Negative      Blood, UA Trace (*)     Nitrites, UA Negative      Urobilinogen, UA Negative      Leukocyte Esterase, UA 1+ (*)    B-TYPE NATRIURETIC PEPTIDE - Abnormal     (*)    CK - Abnormal     (*)    TROPONIN I - Abnormal    Troponin-I 0.034 (*)    CBC WITH DIFFERENTIAL - Abnormal    WBC 3.65 (*)     RBC 3.30 (*)     HGB 9.8 (*)     HCT 29.6 (*)     MCV 90      MCH 29.7      MCHC 33.1      RDW 15.5 (*)     Platelet Count 171      MPV 10.2      Nucleated RBC 0      Neut % 56.2      Lymph % 28.2      Mono % 12.1      Eos % 2.7      Basophil % 0.5      Imm Grans % 0.3      Neut # 2.05      Lymph # 1.03      Mono # 0.44      Eos # 0.10      Baso # 0.02      Imm Grans # 0.01     MAGNESIUM - Abnormal    Magnesium  1.5 (*)    HEPATITIS C ANTIBODY - Normal    Hep C Ab Interp Negative     HIV 1 / 2 ANTIBODY - Normal    HIV 1/2 Ag/Ab Negative     CBC W/ AUTO DIFFERENTIAL    Narrative:     The following orders were created for panel order CBC Auto Differential.  Procedure                               Abnormality         Status                     ---------                               -----------         ------                     CBC with Differential[[id number]]       Abnormal            Final result                 Please view results for these tests on the individual orders.   GREY [place] URINE HOLD    Extra Tube Hold for  add-ons.     URINALYSIS MICROSCOPIC    RBC, UA 2      WBC, UA 4      Bacteria, UA Occasional      Yeast, UA None      Squamous Epithelial Cells, UA 3      Hyaline Casts, UA 0      Microscopic Comment       HEP C VIRUS HOLD SPECIMEN        All Lab Results:  Results for orders placed or performed during the hospital encounter of 04/24/25   Hepatitis C Antibody    Collection Time: 04/24/25  1:43 PM   Result Value Ref Range    Hep C Ab Interp Negative Negative   HIV 1/2 Ag/Ab (4th Gen)    Collection Time: 04/24/25  1:43 PM   Result Value Ref Range    HIV 1/2 Ag/Ab Negative Negative   Comprehensive Metabolic Panel    Collection Time: 04/24/25  1:43 PM   Result Value Ref Range    Sodium 143 136 - 145 mmol/L    Potassium 3.3 (L) 3.5 - 5.1 mmol/L    Chloride 109 95 - 110 mmol/L    CO2 20 (L) 23 - 29 mmol/L    Glucose 99 70 - 110 mg/dL    BUN 36 (H) 8 - 23 mg/dL    Creatinine 3.4 (H) 0.5 - 1.4 mg/dL    Calcium 8.7 8.7 - 10.5 mg/dL    Protein Total 8.6 (H) 6.0 - 8.4 gm/dL    Albumin 3.6 3.5 - 5.2 g/dL    Bilirubin Total 0.5 0.1 - 1.0 mg/dL     40 - 150 unit/L    AST 39 11 - 45 unit/L    ALT 29 10 - 44 unit/L    Anion Gap 14 8 - 16 mmol/L    eGFR 14 (L) >60 mL/min/1.73/m2   BNP    Collection Time: 04/24/25  1:43 PM   Result Value Ref Range     (H) 0 - 99 pg/mL   CK    Collection Time: 04/24/25  1:43 PM   Result Value Ref Range     (H) 20 - 180 U/L   Troponin I    Collection Time: 04/24/25  1:43 PM   Result Value Ref Range    Troponin-I 0.034 (H) <=0.026 ng/mL   CBC with Differential    Collection Time: 04/24/25  1:43 PM   Result Value Ref Range    WBC 3.65 (L) 3.90 - 12.70 K/uL    RBC 3.30 (L) 4.00 - 5.40 M/uL    HGB 9.8 (L) 12.0 - 16.0 gm/dL    HCT 29.6 (L) 37.0 - 48.5 %    MCV 90 82 - 98 fL    MCH 29.7 27.0 - 31.0 pg    MCHC 33.1 32.0 - 36.0 g/dL    RDW 15.5 (H) 11.5 - 14.5 %    Platelet Count 171 150 - 450 K/uL    MPV 10.2 9.2 - 12.9 fL    Nucleated RBC 0 <=0 /100 WBC    Neut % 56.2 38 - 73 %    Lymph %  28.2 18 - 48 %    Mono % 12.1 4 - 15 %    Eos % 2.7 <=8 %    Basophil % 0.5 <=1.9 %    Imm Grans % 0.3 0.0 - 0.5 %    Neut # 2.05 1.8 - 7.7 K/uL    Lymph # 1.03 1 - 4.8 K/uL    Mono # 0.44 0.3 - 1 K/uL    Eos # 0.10 <=0.5 K/uL    Baso # 0.02 <=0.2 K/uL    Imm Grans # 0.01 0.00 - 0.04 K/uL   Magnesium    Collection Time: 04/24/25  1:43 PM   Result Value Ref Range    Magnesium  1.5 (L) 1.6 - 2.6 mg/dL   Urinalysis, Reflex to Urine Culture    Collection Time: 04/24/25  1:54 PM    Specimen: Urine, Clean Catch   Result Value Ref Range    Color, UA Yellow Straw, [place], Yellow, Light-Orange    Appearance, UA Clear Clear    pH, UA 7.0 5.0 - 8.0    Spec Grav UA 1.010 1.005 - 1.030    Protein, UA 2+ (A) Negative    Glucose, UA Negative Negative    Ketones, UA Negative Negative    Bilirubin, UA Negative Negative    Blood, UA Trace (A) Negative    Nitrites, UA Negative Negative    Urobilinogen, UA Negative <2.0 EU/dL    Leukocyte Esterase, UA 1+ (A) Negative   GREY TOP URINE HOLD    Collection Time: 04/24/25  1:54 PM   Result Value Ref Range    Extra Tube Hold for add-ons.    Urinalysis Microscopic    Collection Time: 04/24/25  1:54 PM   Result Value Ref Range    RBC, UA 2 0 - 4 /HPF    WBC, UA 4 0 - 5 /HPF    Bacteria, UA Occasional None, Rare, Occasional /HPF    Yeast, UA None None /HPF    Squamous Epithelial Cells, UA 3 /HPF    Hyaline Casts, UA 0 0 - 1 /LPF    Microscopic Comment       *Note: Due to a large number of results and/or encounters for the requested time period, some results have not been displayed. A complete set of results can be found in Results Review.       Imaging Results:  Imaging Results              X-Ray Chest AP Portable (Final result)  Result time 04/24/25 14:01:54      Final result by Gunner Rao MD (04/24/25 14:01:54)                   Impression:      Stable postoperative chest x-ray.      Electronically signed by: Gunner Rao MD  Date:    04/24/2025  Time:    14:01                Narrative:    EXAMINATION:  XR CHEST AP PORTABLE    CLINICAL HISTORY:  Acute chest pain, chest pain;    COMPARISON:  01/19/2025 x-ray    FINDINGS:  Postop changes with sternal wires.  External pericardial wire noted.    Cardiomegaly.  Chronic scarring within the left lung.    No acute findings.                                       The EKG was ordered, reviewed, and independently interpreted by the ED provider.  Interpretation time: 11:47  Rate: 86 BPM  Rhythm:  Sinus rhythm with premature atrial complexes  Interpretation: Possible Left atrial enlargement. Right bundle branch block. Left anterior fascicular block. Bifascicular block. LVH with repolarization abnormality. No STEMI.         The Emergency Provider reviewed the vital signs and test results, which are outlined above.     ED Discussion     4:00 PM: Dr. Pittman transfers care of patient to Dr. Cardenas pending lab and imaging results.    4:38 PM: Discussed pt's case with Dr. Humphreys (Heart Failure and Transplant Cardiology) who states that pt needs to be transferred to [facility] so that she can be under his service.     4:42 PM: Consult with Dr. Humphreys (Heart Failure and Transplant Cardiology) at [facility] on [address] concerning pt. There are no heart transplant services, which the patient requires, offered at [facility] at this time. Dr. Humphreys expresses understanding and will accept transfer for the pt.  Accepting Facility: [facility] on [address]  Accepting Physician: Dr. Humphreys    4:38 PM: Re-evaluated pt. Informed patient/family/caretaker that there are no heart transplant services  available at this time. I have discussed test results, shared treatment plan, and the need for transfer with patient and family at bedside. All historical, clinical, radiographic, and laboratory findings were reviewed with the patient/family/caretaker in detail. [facility]  [facility] with cardiac monitoring care required en route. Patient/family/caretaker understands that there could be unforeseen vehicle accidents or loss of vital signs that could result in potential death or permanent disability. Pt and/or family/caretaker express understanding at this time and agree with all information. All questions answered. Pt and/or family/caretaker have no further questions or concerns at this time. Patient is ready for transfer.      ED Course as of 04/25/25 0029   Thu Apr 24, 2025   1638 Urinalysis, Reflex to Urine Culture(!)  No UTI [CD]   1645 BNP(!)  Elevated [CD]   1645 Comprehensive Metabolic Panel(!)  Hypokalemia with end-stage renal disease [CD]   1645 Urinalysis Microscopic  No UTI [CD]   1645 HIV 1/2 Ag/Ab (4th Gen)  Negative [CD]   1645 Hepatitis C Antibody  Negative [CD]   1645 Troponin I(!)  Elevated [CD]   1645 CK(!)  Elevated [CD]   1645 CBC Auto Differential(!)  Nonspecific findings [CD]   1646 X-Ray Chest AP Portable  No acute findings [CD]      ED Course User Index  [CD] Cedric Cardenas, DO     Medical Decision Making  Differential diagnoses: Myocardial infarction, angina, pneumonia, asthma, infection, pneumothorax, pericarditis , pleural effusion, GERD, pancreatitis, gallstone pain, cholelithiasis or cholecystitis, esophageal rupture, bowel perforation, gastritis, nonspecific chest pain, musculoskeletal chest pain, costochondritis, aortic dissection,      Amount and/or Complexity of Data Reviewed  Labs: ordered. Decision-making details documented in ED Course.  Radiology: ordered. Decision-making details documented in ED Course.  ECG/medicine tests: ordered and independent interpretation performed. Decision-making details documented in ED Course.    Risk  Prescription drug management.                ED Medication(s):  Medications   morphine injection 4 mg (4 mg Intravenous Given 4/24/25 1445)   potassium chloride SA CR tablet 20 mEq (20 mEq Oral  Given 4/24/25 1800)   nitroGLYCERIN 2% TD oint ointment 1 inch (1 inch Transdermal Given 4/24/25 1800)   morphine injection 4 mg (4 mg Intravenous Given 4/24/25 1917)       Discharge Medication List as of 4/24/2025  7:42 PM                  Scribe Attestation:   Scribe #1: I performed the above scribed service and the documentation accurately describes the services I performed. I attest to the accuracy of the note.     Attending:   Physician Attestation Statement for Scribe #1: I, Larry Pittman MD, personally performed the services described in this documentation, as scribed by Ronnie Martinez, in my presence, and it is both accurate and complete.       Scribe Attestation:   Scribe #2: I performed the above scribed service and the documentation accurately describes the services I performed. I attest to the accuracy of the note.    Attending Attestation:           Physician Attestation for Scribe:    Physician Attestation Statement for Scribe #2: I, Cedric Cardenas DO, reviewed documentation, as scribed by Westley Alexandra in my presence, and it is both accurate and complete. I also acknowledge and confirm the content of the note done by Lisaibe #1.           Clinical Impression       ICD-10-CM ICD-9-CM   1. Chest pain  R07.9 786.50       Disposition:   Disposition: Transferred  Condition: Stable         Cedric Cardenas DO  04/25/25 0031

## 2025-04-25 PROBLEM — R11.0 CHRONIC NAUSEA: Status: ACTIVE | Noted: 2025-04-25

## 2025-04-25 PROBLEM — D64.9 ANEMIA: Status: ACTIVE | Noted: 2025-04-25

## 2025-04-25 PROBLEM — E87.6 HYPOKALEMIA: Status: ACTIVE | Noted: 2025-04-25

## 2025-04-25 PROBLEM — E83.42 HYPOMAGNESEMIA: Status: ACTIVE | Noted: 2025-04-25

## 2025-04-25 LAB
ABSOLUTE EOSINOPHIL (OHS): 0.11 K/UL
ABSOLUTE MONOCYTE (OHS): 0.51 K/UL (ref 0.3–1)
ABSOLUTE NEUTROPHIL COUNT (OHS): 1.84 K/UL (ref 1.8–7.7)
ANION GAP (OHS): 15 MMOL/L (ref 8–16)
ASCENDING AORTA: 3.6 CM
AV AREA BY CONTINUOUS VTI: 2.2 CM2
AV INDEX (PROSTH): 0.61
AV LVOT MEAN GRADIENT: 1 MMHG
AV LVOT PEAK GRADIENT: 2 MMHG
AV MEAN GRADIENT: 2 MMHG
AV PEAK GRADIENT: 5 MMHG
AV VALVE AREA BY VELOCITY RATIO: 1.9 CM²
AV VALVE AREA: 2.1 CM2
AV VELOCITY RATIO: 0.55
BASOPHILS # BLD AUTO: 0.02 K/UL
BASOPHILS NFR BLD AUTO: 0.5 %
BSA FOR ECHO PROCEDURE: 1.7 M2
BUN SERPL-MCNC: 33 MG/DL (ref 8–23)
CALCIUM SERPL-MCNC: 8.7 MG/DL (ref 8.7–10.5)
CHLORIDE SERPL-SCNC: 111 MMOL/L (ref 95–110)
CHOLEST SERPL-MCNC: 196 MG/DL (ref 120–199)
CHOLEST/HDLC SERPL: 3.3 {RATIO} (ref 2–5)
CO2 SERPL-SCNC: 18 MMOL/L (ref 23–29)
CREAT SERPL-MCNC: 3 MG/DL (ref 0.5–1.4)
CRP SERPL-MCNC: 10.37 MG/L
CV ECHO LV RWT: 0.54 CM
DOP CALC AO PEAK VEL: 1.1 M/S
DOP CALC AO VTI: 24.1 CM
DOP CALC LVOT AREA: 3.5 CM2
DOP CALC LVOT DIAMETER: 2.1 CM
DOP CALC LVOT PEAK VEL: 0.6 M/S
DOP CALC LVOT STROKE VOLUME: 50.9 CM3
DOP CALCLVOT PEAK VEL VTI: 14.7 CM
E WAVE DECELERATION TIME: 145 MS
E/A RATIO: 1.51
E/E' RATIO: 8 M/S
ECHO EF ESTIMATED: 63 %
ECHO LV POSTERIOR WALL: 1 CM (ref 0.6–1.1)
ERYTHROCYTE [DISTWIDTH] IN BLOOD BY AUTOMATED COUNT: 15.9 % (ref 11.5–14.5)
ERYTHROCYTE [SEDIMENTATION RATE] IN BLOOD BY PHOTOMETRIC METHOD: >120 MM/HR
FRACTIONAL SHORTENING: 32.4 % (ref 28–44)
GFR SERPLBLD CREATININE-BSD FMLA CKD-EPI: 16 ML/MIN/1.73/M2
GLUCOSE SERPL-MCNC: 69 MG/DL (ref 70–110)
HCT VFR BLD AUTO: 30.1 % (ref 37–48.5)
HDLC SERPL-MCNC: 59 MG/DL (ref 40–75)
HDLC SERPL: 30.1 % (ref 20–50)
HGB BLD-MCNC: 10.1 GM/DL (ref 12–16)
IMM GRANULOCYTES # BLD AUTO: 0.01 K/UL (ref 0–0.04)
IMM GRANULOCYTES NFR BLD AUTO: 0.3 % (ref 0–0.5)
INDIRECT COOMBS: NORMAL
INTERVENTRICULAR SEPTUM: 1 CM (ref 0.6–1.1)
LACTATE SERPL-SCNC: 1.8 MMOL/L (ref 0.5–2.2)
LDLC SERPL CALC-MCNC: 98.6 MG/DL (ref 63–159)
LEFT INTERNAL DIMENSION IN SYSTOLE: 2.5 CM (ref 2.1–4)
LEFT VENTRICLE DIASTOLIC VOLUME INDEX: 34.73 ML/M2
LEFT VENTRICLE DIASTOLIC VOLUME: 58 ML
LEFT VENTRICLE MASS INDEX: 67.4 G/M2
LEFT VENTRICLE SYSTOLIC VOLUME INDEX: 12.6 ML/M2
LEFT VENTRICLE SYSTOLIC VOLUME: 21 ML
LEFT VENTRICULAR INTERNAL DIMENSION IN DIASTOLE: 3.7 CM (ref 3.5–6)
LEFT VENTRICULAR MASS: 112.5 G
LIPASE SERPL-CCNC: 51 U/L (ref 4–60)
LV LATERAL E/E' RATIO: 6.2
LV SEPTAL E/E' RATIO: 9.7
LYMPHOCYTES # BLD AUTO: 1.21 K/UL (ref 1–4.8)
MAGNESIUM SERPL-MCNC: 1.9 MG/DL (ref 1.6–2.6)
MCH RBC QN AUTO: 29.4 PG (ref 27–31)
MCHC RBC AUTO-ENTMCNC: 33.6 G/DL (ref 32–36)
MCV RBC AUTO: 88 FL (ref 82–98)
MV PEAK A VEL: 0.45 M/S
MV PEAK E VEL: 0.68 M/S
NONHDLC SERPL-MCNC: 137 MG/DL
NUCLEATED RBC (/100WBC) (OHS): 0 /100 WBC
OHS CV RV/LV RATIO: 0.92 CM
OHS QRS DURATION: 162 MS
OHS QTC CALCULATION: 559 MS
PISA TR MAX VEL: 2.5 M/S
PLATELET # BLD AUTO: 204 K/UL (ref 150–450)
PMV BLD AUTO: 10.8 FL (ref 9.2–12.9)
POTASSIUM SERPL-SCNC: 4.2 MMOL/L (ref 3.5–5.1)
RBC # BLD AUTO: 3.43 M/UL (ref 4–5.4)
RELATIVE EOSINOPHIL (OHS): 3 %
RELATIVE LYMPHOCYTE (OHS): 32.7 % (ref 18–48)
RELATIVE MONOCYTE (OHS): 13.8 % (ref 4–15)
RELATIVE NEUTROPHIL (OHS): 49.7 % (ref 38–73)
RH BLD: NORMAL
RIGHT VENTRICLE DIASTOLIC BASEL DIMENSION: 3.4 CM
SINUS: 2.99 CM
SODIUM SERPL-SCNC: 144 MMOL/L (ref 136–145)
SPECIMEN OUTDATE: NORMAL
STJ: 3.2 CM
T4 FREE SERPL-MCNC: 0.96 NG/DL (ref 0.71–1.51)
TDI LATERAL: 0.11 M/S
TDI SEPTAL: 0.07 M/S
TDI: 0.09 M/S
TRICUSPID ANNULAR PLANE SYSTOLIC EXCURSION: 1 CM
TRIGL SERPL-MCNC: 192 MG/DL (ref 30–150)
TROPONIN I SERPL HS-MCNC: 25 NG/L
TROPONIN I SERPL HS-MCNC: 29 NG/L
TSH SERPL-ACNC: 12.1 UIU/ML (ref 0.4–4)
TV PEAK GRADIENT: 25 MMHG
WBC # BLD AUTO: 3.7 K/UL (ref 3.9–12.7)
Z-SCORE OF LEFT VENTRICULAR DIMENSION IN END DIASTOLE: -2.32
Z-SCORE OF LEFT VENTRICULAR DIMENSION IN END SYSTOLE: -1.15

## 2025-04-25 PROCEDURE — 85652 RBC SED RATE AUTOMATED: CPT | Mod: HCNC | Performed by: STUDENT IN AN ORGANIZED HEALTH CARE EDUCATION/TRAINING PROGRAM

## 2025-04-25 PROCEDURE — G0378 HOSPITAL OBSERVATION PER HR: HCPCS | Mod: HCNC

## 2025-04-25 PROCEDURE — 83735 ASSAY OF MAGNESIUM: CPT | Mod: HCNC | Performed by: STUDENT IN AN ORGANIZED HEALTH CARE EDUCATION/TRAINING PROGRAM

## 2025-04-25 PROCEDURE — 93010 ELECTROCARDIOGRAM REPORT: CPT | Mod: ,,, | Performed by: INTERNAL MEDICINE

## 2025-04-25 PROCEDURE — 93005 ELECTROCARDIOGRAM TRACING: CPT

## 2025-04-25 PROCEDURE — 25000242 PHARM REV CODE 250 ALT 637 W/ HCPCS: Mod: HCNC | Performed by: STUDENT IN AN ORGANIZED HEALTH CARE EDUCATION/TRAINING PROGRAM

## 2025-04-25 PROCEDURE — 36415 COLL VENOUS BLD VENIPUNCTURE: CPT | Mod: HCNC | Performed by: PHYSICIAN ASSISTANT

## 2025-04-25 PROCEDURE — 85025 COMPLETE CBC W/AUTO DIFF WBC: CPT | Mod: HCNC | Performed by: STUDENT IN AN ORGANIZED HEALTH CARE EDUCATION/TRAINING PROGRAM

## 2025-04-25 PROCEDURE — 84484 ASSAY OF TROPONIN QUANT: CPT | Mod: HCNC | Performed by: STUDENT IN AN ORGANIZED HEALTH CARE EDUCATION/TRAINING PROGRAM

## 2025-04-25 PROCEDURE — 84439 ASSAY OF FREE THYROXINE: CPT | Performed by: STUDENT IN AN ORGANIZED HEALTH CARE EDUCATION/TRAINING PROGRAM

## 2025-04-25 PROCEDURE — 36415 COLL VENOUS BLD VENIPUNCTURE: CPT | Mod: HCNC | Performed by: STUDENT IN AN ORGANIZED HEALTH CARE EDUCATION/TRAINING PROGRAM

## 2025-04-25 PROCEDURE — 80061 LIPID PANEL: CPT | Mod: HCNC | Performed by: STUDENT IN AN ORGANIZED HEALTH CARE EDUCATION/TRAINING PROGRAM

## 2025-04-25 PROCEDURE — 86141 C-REACTIVE PROTEIN HS: CPT | Mod: HCNC | Performed by: STUDENT IN AN ORGANIZED HEALTH CARE EDUCATION/TRAINING PROGRAM

## 2025-04-25 PROCEDURE — 86901 BLOOD TYPING SEROLOGIC RH(D): CPT | Mod: HCNC | Performed by: STUDENT IN AN ORGANIZED HEALTH CARE EDUCATION/TRAINING PROGRAM

## 2025-04-25 PROCEDURE — 80048 BASIC METABOLIC PNL TOTAL CA: CPT | Mod: HCNC | Performed by: STUDENT IN AN ORGANIZED HEALTH CARE EDUCATION/TRAINING PROGRAM

## 2025-04-25 PROCEDURE — 87040 BLOOD CULTURE FOR BACTERIA: CPT | Mod: HCNC | Performed by: STUDENT IN AN ORGANIZED HEALTH CARE EDUCATION/TRAINING PROGRAM

## 2025-04-25 PROCEDURE — 25000242 PHARM REV CODE 250 ALT 637 W/ HCPCS: Mod: HCNC | Performed by: PHYSICIAN ASSISTANT

## 2025-04-25 PROCEDURE — 83690 ASSAY OF LIPASE: CPT | Mod: HCNC | Performed by: STUDENT IN AN ORGANIZED HEALTH CARE EDUCATION/TRAINING PROGRAM

## 2025-04-25 PROCEDURE — 84443 ASSAY THYROID STIM HORMONE: CPT | Performed by: STUDENT IN AN ORGANIZED HEALTH CARE EDUCATION/TRAINING PROGRAM

## 2025-04-25 PROCEDURE — 63600175 PHARM REV CODE 636 W HCPCS: Mod: HCNC | Performed by: STUDENT IN AN ORGANIZED HEALTH CARE EDUCATION/TRAINING PROGRAM

## 2025-04-25 PROCEDURE — 99223 1ST HOSP IP/OBS HIGH 75: CPT | Mod: AI,HCNC,, | Performed by: INTERNAL MEDICINE

## 2025-04-25 PROCEDURE — 83605 ASSAY OF LACTIC ACID: CPT | Mod: HCNC | Performed by: STUDENT IN AN ORGANIZED HEALTH CARE EDUCATION/TRAINING PROGRAM

## 2025-04-25 PROCEDURE — 25000003 PHARM REV CODE 250: Mod: HCNC | Performed by: STUDENT IN AN ORGANIZED HEALTH CARE EDUCATION/TRAINING PROGRAM

## 2025-04-25 PROCEDURE — 84484 ASSAY OF TROPONIN QUANT: CPT | Mod: 91,HCNC | Performed by: PHYSICIAN ASSISTANT

## 2025-04-25 RX ORDER — ACETAMINOPHEN 325 MG/1
650 TABLET ORAL EVERY 4 HOURS PRN
Status: DISCONTINUED | OUTPATIENT
Start: 2025-04-25 | End: 2025-05-06 | Stop reason: HOSPADM

## 2025-04-25 RX ORDER — CARVEDILOL 3.12 MG/1
3.12 TABLET ORAL 2 TIMES DAILY WITH MEALS
Status: DISCONTINUED | OUTPATIENT
Start: 2025-04-25 | End: 2025-05-06 | Stop reason: HOSPADM

## 2025-04-25 RX ORDER — SODIUM CHLORIDE 0.9 % (FLUSH) 0.9 %
10 SYRINGE (ML) INJECTION
Status: DISCONTINUED | OUTPATIENT
Start: 2025-04-25 | End: 2025-05-06 | Stop reason: HOSPADM

## 2025-04-25 RX ORDER — TIZANIDINE 2 MG/1
4 TABLET ORAL 2 TIMES DAILY
Status: DISCONTINUED | OUTPATIENT
Start: 2025-04-25 | End: 2025-04-25

## 2025-04-25 RX ORDER — ZOLPIDEM TARTRATE 5 MG/1
5 TABLET ORAL NIGHTLY PRN
Status: DISCONTINUED | OUTPATIENT
Start: 2025-04-25 | End: 2025-05-06 | Stop reason: HOSPADM

## 2025-04-25 RX ORDER — ALUMINUM HYDROXIDE, MAGNESIUM HYDROXIDE, AND SIMETHICONE 1200; 120; 1200 MG/30ML; MG/30ML; MG/30ML
30 SUSPENSION ORAL
Status: DISCONTINUED | OUTPATIENT
Start: 2025-04-25 | End: 2025-05-03

## 2025-04-25 RX ORDER — PANTOPRAZOLE SODIUM 40 MG/1
40 TABLET, DELAYED RELEASE ORAL DAILY
Status: DISCONTINUED | OUTPATIENT
Start: 2025-04-26 | End: 2025-04-29

## 2025-04-25 RX ORDER — ASPIRIN 81 MG/1
81 TABLET ORAL DAILY
Status: DISCONTINUED | OUTPATIENT
Start: 2025-04-25 | End: 2025-04-29

## 2025-04-25 RX ORDER — CALCITRIOL 0.25 UG/1
0.25 CAPSULE ORAL DAILY
Status: DISCONTINUED | OUTPATIENT
Start: 2025-04-25 | End: 2025-05-06 | Stop reason: HOSPADM

## 2025-04-25 RX ORDER — ONDANSETRON 8 MG/1
8 TABLET, ORALLY DISINTEGRATING ORAL EVERY 8 HOURS PRN
Status: DISCONTINUED | OUTPATIENT
Start: 2025-04-25 | End: 2025-05-06 | Stop reason: HOSPADM

## 2025-04-25 RX ORDER — ALUMINUM HYDROXIDE, MAGNESIUM HYDROXIDE, AND SIMETHICONE 1200; 120; 1200 MG/30ML; MG/30ML; MG/30ML
30 SUSPENSION ORAL EVERY 6 HOURS PRN
Status: DISCONTINUED | OUTPATIENT
Start: 2025-04-25 | End: 2025-05-06 | Stop reason: HOSPADM

## 2025-04-25 RX ORDER — OXYCODONE HYDROCHLORIDE 5 MG/1
5 TABLET ORAL EVERY 4 HOURS PRN
Refills: 0 | Status: DISCONTINUED | OUTPATIENT
Start: 2025-04-25 | End: 2025-05-06 | Stop reason: HOSPADM

## 2025-04-25 RX ORDER — MAGNESIUM SULFATE HEPTAHYDRATE 40 MG/ML
2 INJECTION, SOLUTION INTRAVENOUS ONCE
Status: COMPLETED | OUTPATIENT
Start: 2025-04-25 | End: 2025-04-25

## 2025-04-25 RX ORDER — PREGABALIN 50 MG/1
50 CAPSULE ORAL 2 TIMES DAILY
Status: DISCONTINUED | OUTPATIENT
Start: 2025-04-25 | End: 2025-04-30

## 2025-04-25 RX ORDER — NITROGLYCERIN 0.4 MG/1
0.4 TABLET SUBLINGUAL EVERY 5 MIN PRN
Status: COMPLETED | OUTPATIENT
Start: 2025-04-25 | End: 2025-04-29

## 2025-04-25 RX ORDER — TIZANIDINE 2 MG/1
4 TABLET ORAL 2 TIMES DAILY
Status: DISCONTINUED | OUTPATIENT
Start: 2025-04-25 | End: 2025-05-06 | Stop reason: HOSPADM

## 2025-04-25 RX ORDER — SERTRALINE HYDROCHLORIDE 25 MG/1
25 TABLET, FILM COATED ORAL DAILY
Status: DISCONTINUED | OUTPATIENT
Start: 2025-04-25 | End: 2025-05-06 | Stop reason: HOSPADM

## 2025-04-25 RX ORDER — PANTOPRAZOLE SODIUM 40 MG/10ML
40 INJECTION, POWDER, LYOPHILIZED, FOR SOLUTION INTRAVENOUS DAILY
Status: DISCONTINUED | OUTPATIENT
Start: 2025-04-25 | End: 2025-04-25

## 2025-04-25 RX ORDER — POLYETHYLENE GLYCOL 3350 17 G/17G
17 POWDER, FOR SOLUTION ORAL 2 TIMES DAILY PRN
Status: DISCONTINUED | OUTPATIENT
Start: 2025-04-25 | End: 2025-05-06 | Stop reason: HOSPADM

## 2025-04-25 RX ORDER — NIFEDIPINE 30 MG/1
60 TABLET, EXTENDED RELEASE ORAL DAILY
Status: DISCONTINUED | OUTPATIENT
Start: 2025-04-25 | End: 2025-05-06 | Stop reason: HOSPADM

## 2025-04-25 RX ORDER — SUCRALFATE 1 G/10ML
1 SUSPENSION ORAL EVERY 6 HOURS
Status: DISCONTINUED | OUTPATIENT
Start: 2025-04-25 | End: 2025-05-06 | Stop reason: HOSPADM

## 2025-04-25 RX ORDER — PREGABALIN 50 MG/1
50 CAPSULE ORAL 2 TIMES DAILY
Status: DISCONTINUED | OUTPATIENT
Start: 2025-04-25 | End: 2025-04-25

## 2025-04-25 RX ADMIN — SUCRALFATE 1 G: 1 SUSPENSION ORAL at 06:04

## 2025-04-25 RX ADMIN — TIZANIDINE 4 MG: 4 TABLET ORAL at 08:04

## 2025-04-25 RX ADMIN — CARVEDILOL 3.12 MG: 3.12 TABLET, FILM COATED ORAL at 01:04

## 2025-04-25 RX ADMIN — NITROGLYCERIN 0.4 MG: 0.4 TABLET, ORALLY DISINTEGRATING SUBLINGUAL at 03:04

## 2025-04-25 RX ADMIN — ACETAMINOPHEN 650 MG: 325 TABLET ORAL at 03:04

## 2025-04-25 RX ADMIN — PANTOPRAZOLE SODIUM 40 MG: 40 INJECTION, POWDER, FOR SOLUTION INTRAVENOUS at 08:04

## 2025-04-25 RX ADMIN — SUCRALFATE 1 G: 1 SUSPENSION ORAL at 12:04

## 2025-04-25 RX ADMIN — MAGNESIUM SULFATE HEPTAHYDRATE 2 G: 40 INJECTION, SOLUTION INTRAVENOUS at 02:04

## 2025-04-25 RX ADMIN — NIFEDIPINE 60 MG: 30 TABLET, FILM COATED, EXTENDED RELEASE ORAL at 08:04

## 2025-04-25 RX ADMIN — PREGABALIN 50 MG: 50 CAPSULE ORAL at 09:04

## 2025-04-25 RX ADMIN — ASPIRIN 81 MG: 81 TABLET, COATED ORAL at 08:04

## 2025-04-25 RX ADMIN — SUCRALFATE 1 G: 1 SUSPENSION ORAL at 05:04

## 2025-04-25 RX ADMIN — ONDANSETRON 8 MG: 4 TABLET, ORALLY DISINTEGRATING ORAL at 09:04

## 2025-04-25 RX ADMIN — OXYCODONE 5 MG: 5 TABLET ORAL at 12:04

## 2025-04-25 RX ADMIN — TIZANIDINE 4 MG: 4 TABLET ORAL at 09:04

## 2025-04-25 RX ADMIN — CYCLOSPORINE 75 MG: 25 CAPSULE, LIQUID FILLED ORAL at 09:04

## 2025-04-25 RX ADMIN — ALUMINUM HYDROXIDE, MAGNESIUM HYDROXIDE, AND SIMETHICONE 30 ML: 200; 200; 20 SUSPENSION ORAL at 09:04

## 2025-04-25 RX ADMIN — CARVEDILOL 3.12 MG: 3.12 TABLET, FILM COATED ORAL at 08:04

## 2025-04-25 RX ADMIN — PREGABALIN 50 MG: 50 CAPSULE ORAL at 08:04

## 2025-04-25 RX ADMIN — NITROGLYCERIN 0.4 MG: 0.4 TABLET, ORALLY DISINTEGRATING SUBLINGUAL at 12:04

## 2025-04-25 RX ADMIN — ALUMINUM HYDROXIDE, MAGNESIUM HYDROXIDE, AND SIMETHICONE 30 ML: 200; 200; 20 SUSPENSION ORAL at 03:04

## 2025-04-25 RX ADMIN — PREGABALIN 50 MG: 50 CAPSULE ORAL at 02:04

## 2025-04-25 RX ADMIN — OXYCODONE 5 MG: 5 TABLET ORAL at 08:04

## 2025-04-25 RX ADMIN — NIFEDIPINE 60 MG: 30 TABLET, FILM COATED, EXTENDED RELEASE ORAL at 01:04

## 2025-04-25 RX ADMIN — CALCITRIOL CAPSULES 0.25 MCG 0.25 MCG: 0.25 CAPSULE ORAL at 08:04

## 2025-04-25 RX ADMIN — OXYCODONE 5 MG: 5 TABLET ORAL at 02:04

## 2025-04-25 RX ADMIN — ALUMINUM HYDROXIDE, MAGNESIUM HYDROXIDE, AND SIMETHICONE 30 ML: 200; 200; 20 SUSPENSION ORAL at 10:04

## 2025-04-25 RX ADMIN — ACETAMINOPHEN 650 MG: 325 TABLET ORAL at 12:04

## 2025-04-25 RX ADMIN — SERTRALINE HYDROCHLORIDE 25 MG: 25 TABLET ORAL at 08:04

## 2025-04-25 RX ADMIN — TIZANIDINE 4 MG: 4 TABLET ORAL at 02:04

## 2025-04-25 RX ADMIN — ALUMINUM HYDROXIDE, MAGNESIUM HYDROXIDE, AND SIMETHICONE 30 ML: 200; 200; 20 SUSPENSION ORAL at 06:04

## 2025-04-25 RX ADMIN — CYCLOSPORINE 75 MG: 25 CAPSULE, LIQUID FILLED ORAL at 08:04

## 2025-04-25 NOTE — PROGRESS NOTES
Admit Note     Met with patient to assess needs. Patient is a 69 y.o. single female admitted for Chest pain. S/p OHT 5/27/93.    Patient admitted on 4/24/2025. At this time, patient presents as alert and oriented x 4 and communicative. At this time, patients caregiver was not in attendance.    Household/Family Systems     Patient resides alone at [facility]. Support system includes pt's son, Moy, and DIL, Elisa, who reside in [place], [place]. Pt reports she also has support from her friend, Sandee, as indicated. Pt  reports she has a cousin, Simona, who resides in [place] but cannot assist her because she works. Patient does not have dependents that are need of being cared for.    Pt's home address: [address] [address]. D, [address]                                  [place] [address]    Pt's phone number: [phone]     Patients primary caregiver is self with support of staff at [facility] and friend Sandee. Confirmed patients emergency contacts information as follows:   WOLFGANG Cano, [phone] (resides in [place], [place])  Sandee Butler, friend, [phone] (resides in [place], [place])  Moy Watkins, son, [phone] (resides in [place], [place])  Rosa Ashlyneelimagranddtr, [phone] (resides in [place], [place])     During admission, patient's caregiver plans to stay at home. Confirmed patient and patients caregivers do have access to reliable transportation.    Cognitive Status/Learning     Patient reports reading ability as 10th grade and states she does not have difficulty with reading, writing, seeing, hearing, learning, and memory. Pt reports that she has difficulty with comprehension. Pt reports that she needs information in short sentences and clear/simple language. Pt wears rx glasses due to cataracts. Patient reports she learns best by visual and hands-on.   Needed: No.   Highest education level: High School (9-12) or GED    Vocation/Disability     Working for Income:  No  If no, reason not working: Disability  Patient is disabled due to HF since .  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    Adherence     Patient reports a high level of adherence to her health care regimen. Adherence counseling and education provided. Patient verbalizes understanding.    Substance Use    Patient reports the following substance usage.    Tobacco: none, patient denies any use.  Alcohol:  Pt reports she had 1 beer as a teenager and never consumed any alcohol since then.  Illicit Drugs/Non-prescribed Medications: none, patient denies any use.  Patient states clear understanding of the potential impact of substance use.  Substance abstinence/cessation counseling, education and resources provided and reviewed.     Services Utilizing/ADLS    Infusion Service: Prior to admission, patient utilizing? no  Home Health: Prior to admission, patient utilizing? no  Pt utilized HH services in the past and was uncertain of HHA.  DME: Prior to admission, yes  Pt has a RW, Cane, and TTB at home.  Pulmonary/Cardiac Rehab: Prior to admission, no  Dialysis:  Prior to admission, no  Transplant Specialty Pharmacy:  Prior to admission, yes; Pt reports she utilizes [facility] ([phone]).    Prior to admission, patient reports she was independent with ADLS and was driving. Pt ambulates with Cane and reports falls pta without injury.  Patient reports she is able to care for self at this time. Patient indicates a willingness to care for self once medically cleared to do so.    Insurance/Medications    Insured by   Payer/Plan Subscr  Sex Relation Sub. Ins. ID Effective Group Num   1. HUMANA MANAGE* TICO LIANG ROSY 1955 Female Self [id number]                                   [address] [address]   2. [place]* AZULTICO GALLEGOS 1955 Female Self [id number]* [id number]                                    [address] [address]      Primary Insurance (for OS  reporting): Public Insurance - Medicare & Choice  Secondary Insurance (for UNOS reporting): Public Insurance - Medicaid    Patient reports she is able to obtain and afford medications at this time and at time of discharge.    Living Will/Healthcare Power of     Patient states she has a LW and HCPA. Pt's HCPAs are as follows:  Moy Watkins, primary HCPA   Elisa Watkins, secondary HCPA  Rosa Morales, tertiary HCPA    Coping/Mental Health    Patient is coping adequately with the aid of  family members and beth. Pt states at home she enjoys baking. Patient denies mental health difficulties. Pt reports she sees MENDEL Capps 2x/month for counseling. Per review of Dr. De León's progress note (3/8/25), pt was dx'd with OCPD and prescribed Sertraline. Pt denies SI and/or HI at present and in the past.    Discharge Planning    At time of discharge, patient plans to return to her home at [facility] under the care of self and support from friend, Sandee, as indicated. Patient reports she will need transportation assistance home. Per rounds today, expected discharge date is 4/26/25 . Patient verbalizes understanding and is involved in treatment planning and discharge process.    Additional Concerns    Patient is being followed for needs, education, resources, information, emotional support, supportive counseling, and for supportive and skilled discharge plan of care.  providing ongoing psychosocial support, education, resources and d/c planning as needed.  SW remains available. Patient denies additional needs and/or concerns at this time. Patient verbalizes understanding and agreement with information reviewed, social work availability, and how to access available resources as needed.

## 2025-04-25 NOTE — PROGRESS NOTES
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 TTE unremarkable. Suspect noncardiac chest pain will plan for d/c once optimized more from pain perspective.    Continuous Infusions:  Scheduled Meds:   aluminum-magnesium hydroxide-simethicone  30 mL Oral QID (AC & HS)    aspirin  81 mg Oral Daily    calcitRIOL  0.25 mcg Oral Daily    carvediloL  3.125 mg Oral BID WM    cycloSPORINE modified (NEORAL)  75 mg Oral BID    NIFEdipine  60 mg Oral Daily    [START ON 4/26/2025] pantoprazole  40 mg Oral Daily    pregabalin  50 mg Oral BID    sertraline  25 mg Oral Daily    sucralfate  1 g Oral Q6H    tiZANidine  4 mg Oral BID     PRN Meds:  Current Facility-Administered Medications:     acetaminophen, 650 mg, Oral, Q4H PRN    aluminum-magnesium hydroxide-simethicone, 30 mL, Oral, Q6H PRN    nitroGLYCERIN, 0.4 mg, Sublingual, Q5 Min PRN    ondansetron, 8 mg, Oral, Q8H PRN    oxyCODONE, 5 mg, Oral, Q4H PRN    polyethylene glycol, 17 g, Oral, BID PRN    sodium chloride 0.9%, 10 mL, Intravenous, PRN    zolpidem, 5 mg, Oral, Nightly PRN    Review of patient's allergies indicates:   Allergen Reactions    Dobutamine Other (See Comments)     Severe Left sided chest pain after dosage administered for Dobutamine Stress Test; itching    Lisinopril Swelling and Rash    Hydrocodone-acetaminophen Nausea Only    Augmentin [amoxicillin-pot clavulanate] Diarrhea    Zyvox [linezolid] Nausea And Vomiting     Objective:     Vital Signs (Most Recent):  Temp: 97.9 °F (36.6 °C) (04/25/25 0826)  Pulse: 86 (04/25/25  0826)  Resp: 20 (04/25/25 0826)  BP: 116/71 (04/25/25 0826)  SpO2: 98 % (04/25/25 0826) Vital Signs (24h Range):  Temp:  [97.9 °F (36.6 °C)-98.7 °F (37.1 °C)] 97.9 °F (36.6 °C)  Pulse:  [] 86  Resp:  [15-29] 20  SpO2:  [93 %-100 %] 98 %  BP: (108-183)/(65-99) 116/71     No data found.  Body mass index is 26.69 kg/m².      Intake/Output Summary (Last 24 hours) at 4/25/2025 1122  Last data filed at 4/25/2025 0429  Gross per 24 hour   Intake --   Output 1900 ml   Net -1900 ml          Physical Exam  Constitutional:       General: She is not in acute distress.     Appearance: Normal appearance. She is not ill-appearing.   HENT:      Head: Normocephalic and atraumatic.   Cardiovascular:      Rate and Rhythm: Normal rate and regular rhythm.      Pulses: Normal pulses.      Heart sounds: Normal heart sounds.   Pulmonary:      Effort: Pulmonary effort is normal.      Breath sounds: Normal breath sounds.   Chest:      Comments: L ribcage tender to touch  Abdominal:      General: Abdomen is flat. There is no distension.      Palpations: Abdomen is soft.      Tenderness: There is no abdominal tenderness.   Musculoskeletal:      Right lower leg: No edema.      Left lower leg: No edema.   Skin:     General: Skin is warm and dry.   Neurological:      General: No focal deficit present.      Mental Status: She is alert and oriented to person, place, and time.   Psychiatric:         Mood and Affect: Mood normal.         Behavior: Behavior normal.            Significant Labs:  CBC:  Recent Labs   Lab 04/24/25  1343 04/25/25  0319   WBC 3.65* 3.70*   RBC 3.30* 3.43*   HGB 9.8* 10.1*   HCT 29.6* 30.1*    204   MCV 90 88   MCH 29.7 29.4   MCHC 33.1 33.6     BNP:  Recent Labs   Lab 04/24/25  1343   *     CMP:  Recent Labs   Lab 04/24/25  1343 04/25/25  0319   CALCIUM 8.7 8.7   ALBUMIN 3.6  --     144   K 3.3* 4.2   CO2 20* 18*    111*   BUN 36* 33*   CREATININE 3.4* 3.0*   ALKPHOS 136  --    ALT 29  --   "  AST 39  --    BILITOT 0.5  --       Coagulation:   No results for input(s): "PT", "INR", "APTT" in the last 168 hours.  LDH:  No results for input(s): "LDH" in the last 72 hours.  Microbiology:  Microbiology Results (last 7 days)       Procedure Component Value Units Date/Time    Blood culture [[id number]] Collected: 04/25/25 0319    Order Status: Sent Specimen: Blood Updated: 04/25/25 0817    Blood culture [[id number]] Collected: 04/25/25 0324    Order Status: Sent Specimen: Blood Updated: 04/25/25 0817            I have reviewed all pertinent labs within the past 24 hours.    CrCl cannot be calculated (Unknown ideal weight.).    Diagnostic Results:  I have reviewed all pertinent imaging results/findings within the past 24 hours.  Assessment and Plan:     Ms. Damon is a 68 y/o AAF s/p OHTx 5/27/93 at [facility], s/p PPM same date, mild anemia and leukopenia, OA, cervical spine DJD with radiculopathy and chronic neck pain who presents for her 31st post annual transplant evaluation. Four years ago after her annual was found to have BRCA, underwent excision, chemo/radiation, had a rough time of it, with some complications related to it, however recovered well.      Transferred from [facility] ED due to chest pain. She initially was at the [facility] for pain management procedure for chronic back pain. In preprocedure area complained of nausea and chest pain having taken a sublingual nitro prior to arrival. Reported intermittent chest pain > 1 week. Took all regular medications on an empty stomach and per notes had n/v after starting IV. ECG with repol abnormalities. Noted to be hypokalemic and hypomagnesemic. Troponin I mildly elevated.     Off note, a few months ago was evaluated by  for a reducible ventral hernia containing portion of the liver no bowel noted on exam or imaging. At that time also reported chronic nausea. Elective hernia repair deemed not emergent and if she should require intervention to be done at " [facility] with cardiac surgery and Cardiology heart failure services.      Cardiac PET 11/15/2024    The myocardial perfusion images show no evidence of ischemia or scar.    The whole heart absolute myocardial perfusion values were elevated at rest, low normal during stress and CFR is mildly reduced in part due to elevated resting flow.    CT attenuation images demonstrate no coronary calcifications and mild diffuse aortic calcifications in the ascending aorta, in the descending aorta and moderate calcfications in the aortic arch.    The gated perfusion images showed an ejection fraction of 56% at rest and 59% during stress. A normal ejection fraction is greater than 47%.    There is normal wall motion at rest and normal wall motion during stress.    The study's ECG is negative for ischemia.       TTE 07/20/2024:    Left Ventricle: The left ventricle is normal in size. Normal wall thickness. There is normal systolic function with a visually estimated ejection fraction of 55 - 60%. There is normal diastolic function.    Right Ventricle: Normal right ventricular cavity size. Wall thickness is normal. Systolic function is normal.    Left Atrium: Patent foramen ovale visualized with predominant right to left shunting indicated by saline contrast.    Tricuspid Valve: There is mild regurgitation.    IVC/SVC: Normal venous pressure at 3 mmHg.    The patient is status post cardiac transplantation.  PFO not present with bubble alone, but with valsalva had very small bubbles come across         Cxr: Comparison is made to January 4, 2023.  Electrical lead overlies the lower chest and upper abdomen.  Surgical clips overlie the upper abdomen.  Mediastinal silhouette is prominent.  The lungs are clear.  No pneumothorax or significant pleural effusion     DSE 05/24/21:  The left ventricle is normal in size with concentric remodeling and normal systolic function.  The patient reached the end of the protocol.  There were no arrhythmias  during stress.  Severe left atrial enlargement.  The estimated ejection fraction is 55%.  Grade II left ventricular diastolic dysfunction.  Normal right ventricular size with normal right ventricular systolic function.  Mild-to-moderate mitral regurgitation.  Mild to moderate tricuspid regurgitation.  Normal central venous pressure (3 mmHg).  The estimated PA systolic pressure is 39 mmHg.  The stress echo portion of this study is negative for myocardial ischemia.  Severe left atrial enlargement.  The ECG portion of this study is negative for myocardial ischemia.    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 Damon is a 68 y/o AAF s/p OHTx 5/27/93 at [facility], s/p PPM same date, mild anemia and leukopenia, OA, cervical spine DJD with radiculopathy and chronic neck pain. Four years ago after her annual check up she was found to have BRCA, underwent excision, chemo/radiation, had a rough time of it, with some complications related to it, however recovered well. Transferred from OSH ED due to chest pain. She initially was at the [facility] for pain management procedure for chronic back pain. In preprocedure area complained of nausea and chest pain having taken a sublingual nitro prior to arrival. Reported intermittent chest pain > 1 week. Took all regular medications on an empty stomach and per notes had n/v after starting IV. ECG with repol abnormalities. Noted to be hypokalemic and hypomagnesemic. Troponin I mildly elevated.     Plan:   - HS ordered. ECG repeated bifascicular block noted, old ST depression high lateral leads, T wave inversion anterolateral leads.   - echo ordered. Done and unrevealing.   - continue asa, coreg, nifedipine    Chronic nausea  Chronic N/V. Obtaining KUB. Replacing electrolytes    Hypomagnesemia  -replacing Mag and monitor renal function    Hypokalemia  -replacing electrolytes as needed while monitoring renal function    Anemia  Stable, no acute  signs of bleeding    Chronic right-sided thoracic back pain  -continue home medications    Chronic idiopathic constipation  - prn laxatives    Abnormal serum thyroid stimulating hormone (TSH) level  -repeating thyroid panel    Anemia associated with stage 5 chronic renal failure  Stable renal function    Immunocompromised state due to drug therapy  S/p heart transplant    Essential hypertension  -uncontrolled. Continue adjusting medications        Huber Novak PA-C  Heart Transplant  [facility] - Telemetry Stepdown

## 2025-04-25 NOTE — HPI
Ms. Damon is a 68 y/o AAF s/p OHTx 5/27/93 at [facility], s/p PPM same date, mild anemia and leukopenia, OA, cervical spine DJD with radiculopathy and chronic neck pain who presents for her 31st post annual transplant evaluation. Four years ago after her annual was found to have BRCA, underwent excision, chemo/radiation, had a rough time of it, with some complications related to it, however recovered well.      Transferred from [facility] ED due to chest pain. She initially was at the [facility] for pain management procedure for chronic back pain. In preprocedure area complained of nausea and chest pain having taken a sublingual nitro prior to arrival. Reported intermittent chest pain > 1 week. Took all regular medications on an empty stomach and per notes had n/v after starting IV. ECG with repol abnormalities. Noted to be hypokalemic and hypomagnesemic. Troponin I mildly elevated.     Off note, a few months ago was evaluated by  for a reducible ventral hernia containing portion of the liver no bowel noted on exam or imaging. At that time also reported chronic nausea. Elective hernia repair deemed not emergent and if she should require intervention to be done at center with cardiac surgery and Cardiology heart failure services.      Cardiac PET 11/15/2024    The myocardial perfusion images show no evidence of ischemia or scar.    The whole heart absolute myocardial perfusion values were elevated at rest, low normal during stress and CFR is mildly reduced in part due to elevated resting flow.    CT attenuation images demonstrate no coronary calcifications and mild diffuse aortic calcifications in the ascending aorta, in the descending aorta and moderate calcfications in the aortic arch.    The gated perfusion images showed an ejection fraction of 56% at rest and 59% during stress. A normal ejection fraction is greater than 47%.    There is normal wall motion at rest and normal wall motion during stress.    The study's  ECG is negative for ischemia.       TTE 07/20/2024:    Left Ventricle: The left ventricle is normal in size. Normal wall thickness. There is normal systolic function with a visually estimated ejection fraction of 55 - 60%. There is normal diastolic function.    Right Ventricle: Normal right ventricular cavity size. Wall thickness is normal. Systolic function is normal.    Left Atrium: Patent foramen ovale visualized with predominant right to left shunting indicated by saline contrast.    Tricuspid Valve: There is mild regurgitation.    IVC/SVC: Normal venous pressure at 3 mmHg.    The patient is status post cardiac transplantation.  PFO not present with bubble alone, but with valsalva had very small bubbles come across         Cxr: Comparison is made to January 4, 2023.  Electrical lead overlies the lower chest and upper abdomen.  Surgical clips overlie the upper abdomen.  Mediastinal silhouette is prominent.  The lungs are clear.  No pneumothorax or significant pleural effusion     DSE 05/24/21:  The left ventricle is normal in size with concentric remodeling and normal systolic function.  The patient reached the end of the protocol.  There were no arrhythmias during stress.  Severe left atrial enlargement.  The estimated ejection fraction is 55%.  Grade II left ventricular diastolic dysfunction.  Normal right ventricular size with normal right ventricular systolic function.  Mild-to-moderate mitral regurgitation.  Mild to moderate tricuspid regurgitation.  Normal central venous pressure (3 mmHg).  The estimated PA systolic pressure is 39 mmHg.  The stress echo portion of this study is negative for myocardial ischemia.  Severe left atrial enlargement.  The ECG portion of this study is negative for myocardial ischemia.

## 2025-04-25 NOTE — SUBJECTIVE & OBJECTIVE
Past Medical History:   Diagnosis Date    Abdominal wall hernia     CT Renal 6/11/2018---Small fat containing superior ventral abdominal wall hernia at the epicardial pacing lead site.    Abnormal mammogram 10/12/2021    Acute cystitis without hematuria 05/10/2022    Acute ischemic right middle cerebral artery (MCA) stroke 07/20/2024    Adverse drug reaction 11/12/2024    GRACE (acute kidney injury) 11/22/2021    Anxiety     Aphasia 07/19/2024    Arthritis     ZEN HIPS    Breast cancer in female 08/2021    LEFT BREAST    Breast mass, right 11/13/2024    RIGHT BREAST MASS (Problem)      Bronchitis 08/18/2016    Never smoked      C. difficile colitis 11/29/2021    Cellulitis of axilla, left 12/23/2021    Chronic diastolic heart failure 12/16/2021    Chronic kidney disease     stage 4, GFR 15-29 ml/min    Chronic midline low back pain without sciatica 06/18/2018    CKD (chronic kidney disease) stage 4, GFR 15-29 ml/min 04/20/2016    US Retro   5/16/2018---Mild cortical thinning and increased cortical echogenicity.  Findings can be seen with medical renal disease.  8/31/216--- Echogenic kidneys with diffuse cortical thinning suggesting  medical renal disease. 2 complex right renal cortical cysts.      Closed nondisplaced fracture of distal phalanx of left great toe with routine healing 10/22/2018    Coronary artery disease 1993    heart transplant    COVID-19 in immunocompromised patient 02/26/2024    Cystitis 05/10/2022    Depression     Encounter for blood transfusion     Encounter for palliative care 10/14/2024    Fibromyalgia     on Lyrica    Heart failure     native heart cardiomyopathy    Heart transplanted 1993    due to cardiomyopathy    History of hyperparathyroidism; Hyperparathyroidism, secondary renal     PT DENIES    Hypertension     Immune disorder     anti rejection meds    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    Impaired mobility 07/28/2022    Iron deficiency anemia 08/15/2017     Kidney stones     passed per pt    Obesity     Obesity (BMI 30.0-34.9) 07/22/2019    Other osteoporosis without current pathological fracture 08/30/2019    Other pancytopenia 05/06/2024    Parotitis, acute 09/19/2023    Pharyngitis 01/09/2019    Pneumonia due to infectious organism 03/14/2024    PONV (postoperative nausea and vomiting)     Severe sepsis 11/22/2021    Shingles 2003 approx    left leg    Stage 4 chronic kidney disease 11/22/2021    Subclinical hypothyroidism 06/16/2023    Thrombocytopenia, unspecified 11/29/2021    Trouble in sleeping     Unresponsiveness 11/13/2024    Urinary incontinence        Past Surgical History:   Procedure Laterality Date    bladder implant Right 06/11/2024    BLADDER SURGERY  2015 approx    mesh - Dr Everett then 2nd reconstructive sx Dr Onofre    BREAST BIOPSY Bilateral     NEGATIVE    BREAST BIOPSY Right 10/31/2022    benign    BREAST LUMPECTOMY Left 2021    BREAST SURGERY Left 09/28/2015    Bx - benign    BREAST SURGERY Right 12/2015    Bx benign    CARDIAC PACEMAKER REMOVAL Left 06/26/2014    Pacer defirillator removed. Put in 1993 aat time of heart transplant    CARPAL TUNNEL RELEASE Left 03/03/2015    Dr. Hall    COLONOSCOPY N/A 02/25/2021    Procedure: COLONOSCOPY;  Surgeon: Freida Ramirez MD;  Location[facility];  Service: Endoscopy;  Laterality: N/A;    CYSTOCELE REPAIR      Twice with mesh removal    EPIDURAL STEROID INJECTION INTO CERVICAL SPINE N/A 02/02/2023    Procedure: T11/T12 IL HELLEN;  Surgeon: Jassi Pierre MD;  Location: [facility] PAIN [facility]GT;  Service: Pain Management;  Laterality: N/A;    EPIDURAL STEROID INJECTION INTO CERVICAL SPINE N/A 9/16/2024    Procedure: T11/T12 IL HELLEN;  Surgeon: Jassi Pierre MD;  Location: [facility]IN [facility]GT;  Service: Pain Management;  Laterality: N/A;    HEART TRANSPLANT  1993    HERNIA REPAIR Right 1971 approx    Inguinal    HYSTERECTOMY  1983    vag hyst /LSO     IMPLANTATION OF PERMANENT SACRAL NERVE STIMULATOR N/A  06/11/2024    Procedure: INSERTION, NEUROSTIMULATOR, PERMANENT, SACRAL;  Surgeon: Sami Cochran MD;  Location: [facility] OR;  Service: Urology;  Laterality: N/A;    INCISION AND DRAINAGE OF ABSCESS Left 12/24/2021    Procedure: INCISION AND DRAINAGE, ABSCESS;  Surgeon: Joseph Longo MD;  Location: [facility] OR;  Service: General;  Laterality: Left;    INJECTION OF ANESTHETIC AGENT AROUND MEDIAL BRANCH NERVES INNERVATING LUMBAR FACET JOINT Right 10/19/2022    Procedure: Right L4/L5 and L5/S1 MBB;  Surgeon: Jassi Pierre MD;  Location: [facility] PAIN [facility]GT;  Service: Pain Management;  Laterality: Right;    INJECTION OF ANESTHETIC AGENT AROUND MEDIAL BRANCH NERVES INNERVATING LUMBAR FACET JOINT Right 11/09/2022    Procedure: Right L4/L5 and L5/S1 MBB;  Surgeon: Jassi Pierre MD;  [facility];  Service: Pain Management;  Laterality: Right;    INJECTION OF ANESTHETIC AGENT AROUND MEDIAL BRANCH NERVES INNERVATING LUMBAR FACET JOINT Left 2/6/2025    Procedure: Left L4-5 and L5-S1 MBB #1 with local;  Surgeon: Jassi Pierre MD;  Location: [facility]IN [facility]GT;  Service: Pain Management;  Laterality: Left;    INJECTION OF ANESTHETIC AGENT AROUND MEDIAL BRANCH NERVES INNERVATING LUMBAR FACET JOINT Left 3/19/2025    Procedure: Left L4-5 and L5-S1 MBB #2 with local;  Surgeon: Jassi Pierre MD;  Location: [facility]IN [facility]GT;  Service: Pain Management;  Laterality: Left;    INJECTION OF ANESTHETIC AGENT INTO SACROILIAC JOINT Right 08/22/2022    Procedure: Right SIJ Injection Right L5/S1 Facte Injection;  Surgeon: Jassi Pierre MD;  Location: [facility]IN [facility]GT;  Service: Pain Management;  Laterality: Right;    INSERTION OF TUNNELED CENTRAL VENOUS CATHETER (CVC) WITH SUBCUTANEOUS PORT N/A 11/09/2021    Procedure: [id number];  Surgeon: Christoph Douglas MD;  Location: [facility] OR;  Service: General;  Laterality: N/A;    RADIOFREQUENCY ABLATION Right 12/5/2024    Procedure: Right L4-5 and L5-S1 RFA;  Surgeon:  Jassi Pierre MD;  Location: [facility]IN [facility]GT;  Service: Pain Management;  Laterality: Right;    RADIOFREQUENCY THERMOCOAGULATION Right 12/07/2022    Procedure: Right L4/L5 and L5/S1 Lumbar RFA;  Surgeon: Jassi Pierre MD;  Location: [facility];  Service: Pain Management;  Laterality: Right;    REMOVAL OF ELECTRODE LEAD OF SACRAL NERVE STIMULATOR N/A 2/18/2025    Procedure: REMOVAL, ELECTRODE LEAD, SACRAL NERVE STIMULATOR;  Surgeon: Sami Cochran MD;  Location: [facility];  Service: Urology;  Laterality: N/A;    REMOVAL OF VASCULAR ACCESS PORT      ROBOT-ASSISTED LAPAROSCOPIC ABDOMINAL SACROCOLPOPEXY N/A 08/10/2023    Procedure: ROBOTIC SACROCOLPOPEXY, ABDOMEN;  Surgeon: PRANAY Villalobos MD;  Location: [facility];  Service: OB/GYN;  Laterality: N/A;    ROBOT-ASSISTED LAPAROSCOPIC OOPHORECTOMY Right 08/10/2023    Procedure: ROBOTIC OOPHORECTOMY;  Surgeon: PRANAY Villalobos MD;  Location: [facility] OR;  Service: OB/GYN;  Laterality: Right;    SENTINEL LYMPH NODE BIOPSY Left 10/12/2021    Procedure: BIOPSY, LYMPH NODE, SENTINEL;  Surgeon: Christoph Douglas MD;  Location: [facility];  Service: General;  Laterality: Left;    TOE SURGERY      XI ROBOTIC URETHROPEXY N/A 08/10/2023    Procedure: XI ROBOTIC URETHROPEXY;  Surgeon: PRANAY Villalobos MD;  Location: [facility];  Service: OB/GYN;  Laterality: N/A;       Review of patient's allergies indicates:   Allergen Reactions    Dobutamine Other (See Comments)     Severe Left sided chest pain after dosage administered for Dobutamine Stress Test; itching    Lisinopril Swelling and Rash    Hydrocodone-acetaminophen Nausea Only    Augmentin [amoxicillin-pot clavulanate] Diarrhea    Zyvox [linezolid] Nausea And Vomiting       Current Facility-Administered Medications   Medication    acetaminophen tablet 650 mg    aluminum-magnesium hydroxide-simethicone 200-200-20 mg/5 mL suspension 30 mL    aluminum-magnesium hydroxide-simethicone 200-200-20 mg/5 mL suspension 30 mL     aspirin EC tablet 81 mg    calcitRIOL capsule 0.25 mcg    carvediloL tablet 3.125 mg    cycloSPORINE modified (NEORAL) capsule 75 mg    NIFEdipine 24 hr tablet 60 mg    nitroGLYCERIN SL tablet 0.4 mg    ondansetron disintegrating tablet 8 mg    oxyCODONE immediate release tablet 5 mg    pantoprazole injection 40 mg    polyethylene glycol packet 17 g    pregabalin capsule 50 mg    sertraline tablet 25 mg    sodium chloride 0.9% flush 10 mL    sucralfate 100 mg/mL suspension 1 g    tiZANidine tablet 4 mg    zolpidem tablet 5 mg     Family History       Problem Relation (Age of Onset)    Breast cancer Mother, Maternal Grandmother    Cancer Mother (38)    Cataracts Cousin    Heart disease Maternal Grandmother    Hypertension Son          Tobacco Use    Smoking status: Never     Passive exposure: Never    Smokeless tobacco: Never   Substance and Sexual Activity    Alcohol use: Never     Alcohol/week: 0.0 standard drinks of alcohol    Drug use: No    Sexual activity: Not Currently     Partners: Male     Birth control/protection: See Surgical Hx     Review of Systems   Constitutional:  Negative for chills and diaphoresis.   Respiratory:  Positive for chest tightness. Negative for cough and choking.    Gastrointestinal:  Positive for abdominal pain, nausea and vomiting.   Musculoskeletal:  Positive for back pain.   Neurological:  Negative for light-headedness, numbness and headaches.   Psychiatric/Behavioral:  The patient is not nervous/anxious.    All other systems reviewed and are negative.    Objective:     Vital Signs (Most Recent):  Temp: 98.7 °F (37.1 °C) (04/25/25 0345)  Pulse: 65 (04/25/25 0345)  Resp: 16 (04/25/25 0345)  BP: 108/65 (04/25/25 0345)  SpO2: (!) 93 % (04/25/25 0345) Vital Signs (24h Range):  Temp:  [97.8 °F (36.6 °C)-98.7 °F (37.1 °C)] 98.7 °F (37.1 °C)  Pulse:  [] 65  Resp:  [15-29] 16  SpO2:  [93 %-100 %] 93 %  BP: (108-183)/(65-99) 108/65     No data found.  There is no height or weight on  "file to calculate BMI.      Intake/Output Summary (Last 24 hours) at 4/25/2025 0438  Last data filed at 4/25/2025 0003  Gross per 24 hour   Intake --   Output 1000 ml   Net -1000 ml          Physical Exam       Significant Labs:  CBC:  Recent Labs   Lab 04/24/25  1343 04/25/25  0319   WBC 3.65* 3.70*   RBC 3.30* 3.43*   HGB 9.8* 10.1*   HCT 29.6* 30.1*    204   MCV 90 88   MCH 29.7 29.4   MCHC 33.1 33.6     BNP:  Recent Labs   Lab 04/24/25  1343   *     CMP:  Recent Labs   Lab 04/24/25  1343   CALCIUM 8.7   ALBUMIN 3.6      K 3.3*   CO2 20*      BUN 36*   CREATININE 3.4*   ALKPHOS 136   ALT 29   AST 39   BILITOT 0.5      Coagulation:   No results for input(s): "PT", "INR", "APTT" in the last 168 hours.  LDH:  No results for input(s): "LDH" in the last 72 hours.  Microbiology:  Microbiology Results (last 7 days)       Procedure Component Value Units Date/Time    Blood culture [[id number]] Collected: 04/25/25 0324    Order Status: Sent Specimen: Blood     Blood culture [[id number]] Collected: 04/25/25 0319    Order Status: Sent Specimen: Blood             I have reviewed all pertinent labs within the past 24 hours.    Diagnostic Results:  I have reviewed and interpreted all pertinent imaging results/findings within the past 24 hours.    "

## 2025-04-25 NOTE — NURSING
Rounds Report: Attended interdisciplinary rounds this afternoon with the transplant team including SW, physicians, fellows,  mid-level providers, and transplant coordinators. Discussed plan of care, including DC date, current medications including CYA and current plan.  I was able to talk with the pt today.

## 2025-04-25 NOTE — ASSESSMENT & PLAN NOTE
Ms. Damon is a 70 y/o AAF s/p OHTx 5/27/93 at [facility], s/p PPM same date, mild anemia and leukopenia, OA, cervical spine DJD with radiculopathy and chronic neck pain. Four years ago after her annual check up she was found to have BRCA, underwent excision, chemo/radiation, had a rough time of it, with some complications related to it, however recovered well. Transferred from [facility] ED due to chest pain. She initially was at the [facility] for pain management procedure for chronic back pain. In preprocedure area complained of nausea and chest pain having taken a sublingual nitro prior to arrival. Reported intermittent chest pain > 1 week. Took all regular medications on an empty stomach and per notes had n/v after starting IV. ECG with repol abnormalities. Noted to be hypokalemic and hypomagnesemic. Troponin I mildly elevated.     Plan:   - HS ordered. ECG repeated bifascicular block noted, old ST depression high lateral leads, T wave inversion anterolateral leads.   - echo ordered  - will keep NPO and will discuss with team if cardiac stress test preferred in the setting of CKD5.   - continue asa, coreg, nifedipine

## 2025-04-25 NOTE — SUBJECTIVE & OBJECTIVE
Interval History: Admitted w/ Chest pain, this AM 6/10 chest pain from L chest that was relieved partially w/ oxy. TTE unremarkable. Suspect noncardiac chest pain will plan for d/c once optimized more from pain perspective.    Continuous Infusions:  Scheduled Meds:   aluminum-magnesium hydroxide-simethicone  30 mL Oral QID (AC & HS)    aspirin  81 mg Oral Daily    calcitRIOL  0.25 mcg Oral Daily    carvediloL  3.125 mg Oral BID WM    cycloSPORINE modified (NEORAL)  75 mg Oral BID    NIFEdipine  60 mg Oral Daily    [START ON 4/26/2025] pantoprazole  40 mg Oral Daily    pregabalin  50 mg Oral BID    sertraline  25 mg Oral Daily    sucralfate  1 g Oral Q6H    tiZANidine  4 mg Oral BID     PRN Meds:  Current Facility-Administered Medications:     acetaminophen, 650 mg, Oral, Q4H PRN    aluminum-magnesium hydroxide-simethicone, 30 mL, Oral, Q6H PRN    nitroGLYCERIN, 0.4 mg, Sublingual, Q5 Min PRN    ondansetron, 8 mg, Oral, Q8H PRN    oxyCODONE, 5 mg, Oral, Q4H PRN    polyethylene glycol, 17 g, Oral, BID PRN    sodium chloride 0.9%, 10 mL, Intravenous, PRN    zolpidem, 5 mg, Oral, Nightly PRN    Review of patient's allergies indicates:   Allergen Reactions    Dobutamine Other (See Comments)     Severe Left sided chest pain after dosage administered for Dobutamine Stress Test; itching    Lisinopril Swelling and Rash    Hydrocodone-acetaminophen Nausea Only    Augmentin [amoxicillin-pot clavulanate] Diarrhea    Zyvox [linezolid] Nausea And Vomiting     Objective:     Vital Signs (Most Recent):  Temp: 97.9 °F (36.6 °C) (04/25/25 0826)  Pulse: 86 (04/25/25 0826)  Resp: 20 (04/25/25 0826)  BP: 116/71 (04/25/25 0826)  SpO2: 98 % (04/25/25 0826) Vital Signs (24h Range):  Temp:  [97.9 °F (36.6 °C)-98.7 °F (37.1 °C)] 97.9 °F (36.6 °C)  Pulse:  [] 86  Resp:  [15-29] 20  SpO2:  [93 %-100 %] 98 %  BP: (108-183)/(65-99) 116/71     No data found.  Body mass index is 26.69 kg/m².      Intake/Output Summary (Last 24 hours) at  "4/25/2025 1122  Last data filed at 4/25/2025 0429  Gross per 24 hour   Intake --   Output 1900 ml   Net -1900 ml          Physical Exam  Constitutional:       General: She is not in acute distress.     Appearance: Normal appearance. She is not ill-appearing.   HENT:      Head: Normocephalic and atraumatic.   Cardiovascular:      Rate and Rhythm: Normal rate and regular rhythm.      Pulses: Normal pulses.      Heart sounds: Normal heart sounds.   Pulmonary:      Effort: Pulmonary effort is normal.      Breath sounds: Normal breath sounds.   Chest:      Comments: L ribcage tender to touch  Abdominal:      General: Abdomen is flat. There is no distension.      Palpations: Abdomen is soft.      Tenderness: There is no abdominal tenderness.   Musculoskeletal:      Right lower leg: No edema.      Left lower leg: No edema.   Skin:     General: Skin is warm and dry.   Neurological:      General: No focal deficit present.      Mental Status: She is alert and oriented to person, place, and time.   Psychiatric:         Mood and Affect: Mood normal.         Behavior: Behavior normal.            Significant Labs:  CBC:  Recent Labs   Lab 04/24/25  1343 04/25/25 0319   WBC 3.65* 3.70*   RBC 3.30* 3.43*   HGB 9.8* 10.1*   HCT 29.6* 30.1*    204   MCV 90 88   MCH 29.7 29.4   MCHC 33.1 33.6     BNP:  Recent Labs   Lab 04/24/25  1343   *     CMP:  Recent Labs   Lab 04/24/25  1343 04/25/25 0319   CALCIUM 8.7 8.7   ALBUMIN 3.6  --     144   K 3.3* 4.2   CO2 20* 18*    111*   BUN 36* 33*   CREATININE 3.4* 3.0*   ALKPHOS 136  --    ALT 29  --    AST 39  --    BILITOT 0.5  --       Coagulation:   No results for input(s): "PT", "INR", "APTT" in the last 168 hours.  LDH:  No results for input(s): "LDH" in the last 72 hours.  Microbiology:  Microbiology Results (last 7 days)       Procedure Component Value Units Date/Time    Blood culture [[id number]] Collected: 04/25/25 0319    Order Status: Sent Specimen: Blood " Updated: 04/25/25 0817    Blood culture [[id number]] Collected: 04/25/25 0324    Order Status: Sent Specimen: Blood Updated: 04/25/25 0817            I have reviewed all pertinent labs within the past 24 hours.    CrCl cannot be calculated (Unknown ideal weight.).    Diagnostic Results:  I have reviewed all pertinent imaging results/findings within the past 24 hours.

## 2025-04-25 NOTE — PROGRESS NOTES
"   04/25/25 0659   Vital Signs   Pulse 81   Heart Rate Source Monitor   Resp 18   /65   Height and Weight   Height 5' 2" (1.575 m)   Weight in (lb) to have BMI = 25 136.4   Vitals   Systolic Average 0   Diastolic Average 0     Pt aaox4 with abc's intact. Ok to give all morning meds per MD. Pt rates chest pain 6/10. Pt refusing Nitro at this time . Pt states that chest pain is better and that she's been having chest pain. Pt requesting PRN oxy. Ok to give per MD.Pt refusing Nitro at this time. Pt educated. Md notified. Will cont poc   "

## 2025-04-25 NOTE — H&P
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 Damon is a 68 y/o AAF s/p OHTx 5/27/93 at [facility], s/p PPM same date, mild anemia and leukopenia, OA, cervical spine DJD with radiculopathy and chronic neck pain who presents for her 31st post annual transplant evaluation. Four years ago after her annual was found to have BRCA, underwent excision, chemo/radiation, had a rough time of it, with some complications related to it, however recovered well.      Transferred from OS ED due to chest pain. She initially was at the [facility] for pain management procedure for chronic back pain. In preprocedure area complained of nausea and chest pain having taken a sublingual nitro prior to arrival. Reported intermittent chest pain > 1 week. Took all regular medications on an empty stomach and per notes had n/v after starting IV. ECG with repol abnormalities. Noted to be hypokalemic and hypomagnesemic. Troponin I mildly elevated.     Off note, a few months ago was evaluated by [facility] for a reducible ventral hernia containing portion of the liver no bowel noted on exam or imaging. At that time also reported chronic nausea. Elective hernia repair deemed not emergent and if she should require intervention to be done at center with cardiac surgery and Cardiology heart failure services.      Cardiac PET 11/15/2024    The myocardial perfusion images show no evidence of ischemia or scar.    The whole heart absolute myocardial perfusion values were elevated at rest, low normal during stress and CFR is mildly reduced in part due to elevated resting flow.    CT attenuation images demonstrate no coronary calcifications and  mild diffuse aortic calcifications in the ascending aorta, in the descending aorta and moderate calcfications in the aortic arch.    The gated perfusion images showed an ejection fraction of 56% at rest and 59% during stress. A normal ejection fraction is greater than 47%.    There is normal wall motion at rest and normal wall motion during stress.    The study's ECG is negative for ischemia.       TTE 07/20/2024:    Left Ventricle: The left ventricle is normal in size. Normal wall thickness. There is normal systolic function with a visually estimated ejection fraction of 55 - 60%. There is normal diastolic function.    Right Ventricle: Normal right ventricular cavity size. Wall thickness is normal. Systolic function is normal.    Left Atrium: Patent foramen ovale visualized with predominant right to left shunting indicated by saline contrast.    Tricuspid Valve: There is mild regurgitation.    IVC/SVC: Normal venous pressure at 3 mmHg.    The patient is status post cardiac transplantation.  PFO not present with bubble alone, but with valsalva had very small bubbles come across         Cxr: Comparison is made to January 4, 2023.  Electrical lead overlies the lower chest and upper abdomen.  Surgical clips overlie the upper abdomen.  Mediastinal silhouette is prominent.  The lungs are clear.  No pneumothorax or significant pleural effusion     DSE 05/24/21:  The left ventricle is normal in size with concentric remodeling and normal systolic function.  The patient reached the end of the protocol.  There were no arrhythmias during stress.  Severe left atrial enlargement.  The estimated ejection fraction is 55%.  Grade II left ventricular diastolic dysfunction.  Normal right ventricular size with normal right ventricular systolic function.  Mild-to-moderate mitral regurgitation.  Mild to moderate tricuspid regurgitation.  Normal central venous pressure (3 mmHg).  The estimated PA systolic pressure is 39 mmHg.  The stress  echo portion of this study is negative for myocardial ischemia.  Severe left atrial enlargement.  The ECG portion of this study is negative for myocardial ischemia.    Past Medical History:   Diagnosis Date    Abdominal wall hernia     CT Renal 6/11/2018---Small fat containing superior ventral abdominal wall hernia at the epicardial pacing lead site.    Abnormal mammogram 10/12/2021    Acute cystitis without hematuria 05/10/2022    Acute ischemic right middle cerebral artery (MCA) stroke 07/20/2024    Adverse drug reaction 11/12/2024    GRACE (acute kidney injury) 11/22/2021    Anxiety     Aphasia 07/19/2024    Arthritis     ZEN HIPS    Breast cancer in female 08/2021    LEFT BREAST    Breast mass, right 11/13/2024    RIGHT BREAST MASS (Problem)      Bronchitis 08/18/2016    Never smoked      C. difficile colitis 11/29/2021    Cellulitis of axilla, left 12/23/2021    Chronic diastolic heart failure 12/16/2021    Chronic kidney disease     stage 4, GFR 15-29 ml/min    Chronic midline low back pain without sciatica 06/18/2018    CKD (chronic kidney disease) stage 4, GFR 15-29 ml/min 04/20/2016    US Retro   5/16/2018---Mild cortical thinning and increased cortical echogenicity.  Findings can be seen with medical renal disease.  8/31/216--- Echogenic kidneys with diffuse cortical thinning suggesting  medical renal disease. 2 complex right renal cortical cysts.      Closed nondisplaced fracture of distal phalanx of left great toe with routine healing 10/22/2018    Coronary artery disease 1993    heart transplant    COVID-19 in immunocompromised patient 02/26/2024    Cystitis 05/10/2022    Depression     Encounter for blood transfusion     Encounter for palliative care 10/14/2024    Fibromyalgia     on Lyrica    Heart failure     native heart cardiomyopathy    Heart transplanted 1993    due to cardiomyopathy    History of hyperparathyroidism; Hyperparathyroidism, secondary renal     PT DENIES    Hypertension     Immune  disorder     anti rejection meds    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    Impaired mobility 07/28/2022    Iron deficiency anemia 08/15/2017    Kidney stones     passed per pt    Obesity     Obesity (BMI 30.0-34.9) 07/22/2019    Other osteoporosis without current pathological fracture 08/30/2019    Other pancytopenia 05/06/2024    Parotitis, acute 09/19/2023    Pharyngitis 01/09/2019    Pneumonia due to infectious organism 03/14/2024    PONV (postoperative nausea and vomiting)     Severe sepsis 11/22/2021    Shingles 2003 approx    left leg    Stage 4 chronic kidney disease 11/22/2021    Subclinical hypothyroidism 06/16/2023    Thrombocytopenia, unspecified 11/29/2021    Trouble in sleeping     Unresponsiveness 11/13/2024    Urinary incontinence        Past Surgical History:   Procedure Laterality Date    bladder implant Right 06/11/2024    BLADDER SURGERY  2015 approx    mesh - Dr Everett then 2nd reconstructive sx Dr Onofre    BREAST BIOPSY Bilateral     NEGATIVE    BREAST BIOPSY Right 10/31/2022    benign    BREAST LUMPECTOMY Left 2021    BREAST SURGERY Left 09/28/2015    Bx - benign    BREAST SURGERY Right 12/2015    Bx benign    CARDIAC PACEMAKER REMOVAL Left 06/26/2014    Pacer defirillator removed. Put in 1993 aat time of heart transplant    CARPAL TUNNEL RELEASE Left 03/03/2015    Dr. Hall    COLONOSCOPY N/A 02/25/2021    Procedure: COLONOSCOPY;  Surgeon: Freida Ramirez MD;  Location: [facility];  Service: Endoscopy;  Laterality: N/A;    CYSTOCELE REPAIR      Twice with mesh removal    EPIDURAL STEROID INJECTION INTO CERVICAL SPINE N/A 02/02/2023    Procedure: T11/T12 IL HELLEN;  Surgeon: Jassi Pierre MD;  Location: [facility] PAIN MGT;  Service: Pain Management;  Laterality: N/A;    EPIDURAL STEROID INJECTION INTO CERVICAL SPINE N/A 9/16/2024    Procedure: T11/T12 IL HELLEN;  Surgeon: Jassi Pierre MD;  Location: [facility]IN [facility]GT;  Service: Pain Management;  Laterality: N/A;    HEART  TRANSPLANT  1993    HERNIA REPAIR Right 1971 approx    Inguinal    HYSTERECTOMY  1983    vag hyst /LSO     IMPLANTATION OF PERMANENT SACRAL NERVE STIMULATOR N/A 06/11/2024    Procedure: INSERTION, NEUROSTIMULATOR, PERMANENT, SACRAL;  Surgeon: Sami Cochran MD;  Location: [facility] OR;  Service: Urology;  Laterality: N/A;    INCISION AND DRAINAGE OF ABSCESS Left 12/24/2021    Procedure: INCISION AND DRAINAGE, ABSCESS;  Surgeon: Joseph Longo MD;  Location: [facility] OR;  Service: General;  Laterality: Left;    INJECTION OF ANESTHETIC AGENT AROUND MEDIAL BRANCH NERVES INNERVATING LUMBAR FACET JOINT Right 10/19/2022    Procedure: Right L4/L5 and L5/S1 MBB;  Surgeon: Jassi Pierre MD;  Location: [facility] PAIN [facility]GT;  Service: Pain Management;  Laterality: Right;    INJECTION OF ANESTHETIC AGENT AROUND MEDIAL BRANCH NERVES INNERVATING LUMBAR FACET JOINT Right 11/09/2022    Procedure: Right L4/L5 and L5/S1 MBB;  Surgeon: Jassi Pierre MD;  Location: [facility] PAIN [facility]GT;  Service: Pain Management;  Laterality: Right;    INJECTION OF ANESTHETIC AGENT AROUND MEDIAL BRANCH NERVES INNERVATING LUMBAR FACET JOINT Left 2/6/2025    Procedure: Left L4-5 and L5-S1 MBB #1 with local;  Surgeon: Jassi Pierre MD;  Location: [facility] PAIN [facility]GT;  Service: Pain Management;  Laterality: Left;    INJECTION OF ANESTHETIC AGENT AROUND MEDIAL BRANCH NERVES INNERVATING LUMBAR FACET JOINT Left 3/19/2025    Procedure: Left L4-5 and L5-S1 MBB #2 with local;  Surgeon: Jassi Pierre MD;  Location: [facility] PAIN [facility]GT;  Service: Pain Management;  Laterality: Left;    INJECTION OF ANESTHETIC AGENT INTO SACROILIAC JOINT Right 08/22/2022    Procedure: Right SIJ Injection Right L5/S1 Facte Injection;  Surgeon: Jassi Pierre MD;  Location: [facility] PAIN [facility]GT;  Service: Pain Management;  Laterality: Right;    INSERTION OF TUNNELED CENTRAL VENOUS CATHETER (CVC) WITH SUBCUTANEOUS PORT N/A 11/09/2021    Procedure: [id number]-CATH;  Surgeon: Christoph Douglas,  MD;  Location: [facility] OR;  Service: General;  Laterality: N/A;    RADIOFREQUENCY ABLATION Right 12/5/2024    Procedure: Right L4-5 and L5-S1 RFA;  Surgeon: Jassi Pierre MD;  Location: [facility] PAIN [facility]GT;  Service: Pain Management;  Laterality: Right;    RADIOFREQUENCY THERMOCOAGULATION Right 12/07/2022    Procedure: Right L4/L5 and L5/S1 Lumbar RFA;  Surgeon: Jassi Pierre MD;  Location: [facility] PAIN [facility]GT;  Service: Pain Management;  Laterality: Right;    REMOVAL OF ELECTRODE LEAD OF SACRAL NERVE STIMULATOR N/A 2/18/2025    Procedure: REMOVAL, ELECTRODE LEAD, SACRAL NERVE STIMULATOR;  Surgeon: Sami Cochran MD;  Location: [facility];  Service: Urology;  Laterality: N/A;    REMOVAL OF VASCULAR ACCESS PORT      ROBOT-ASSISTED LAPAROSCOPIC ABDOMINAL SACROCOLPOPEXY N/A 08/10/2023    Procedure: ROBOTIC SACROCOLPOPEXY, ABDOMEN;  Surgeon: PRANAY Villalobos MD;  Location: [facility] OR;  Service: OB/GYN;  Laterality: N/A;    ROBOT-ASSISTED LAPAROSCOPIC OOPHORECTOMY Right 08/10/2023    Procedure: ROBOTIC OOPHORECTOMY;  Surgeon: PRANAY Villalobos MD;  Location: [facility] OR;  Service: OB/GYN;  Laterality: Right;    SENTINEL LYMPH NODE BIOPSY Left 10/12/2021    Procedure: BIOPSY, LYMPH NODE, SENTINEL;  Surgeon: Christoph Douglas MD;  Location: [facility];  Service: General;  Laterality: Left;    TOE SURGERY      XI ROBOTIC URETHROPEXY N/A 08/10/2023    Procedure: XI ROBOTIC URETHROPEXY;  Surgeon: PRANAY Villalobos MD;  Location: [facility];  Service: OB/GYN;  Laterality: N/A;       Review of patient's allergies indicates:   Allergen Reactions    Dobutamine Other (See Comments)     Severe Left sided chest pain after dosage administered for Dobutamine Stress Test; itching    Lisinopril Swelling and Rash    Hydrocodone-acetaminophen Nausea Only    Augmentin [amoxicillin-pot clavulanate] Diarrhea    Zyvox [linezolid] Nausea And Vomiting       Current Facility-Administered Medications   Medication    acetaminophen tablet 650 mg     aluminum-magnesium hydroxide-simethicone 200-200-20 mg/5 mL suspension 30 mL    aluminum-magnesium hydroxide-simethicone 200-200-20 mg/5 mL suspension 30 mL    aspirin EC tablet 81 mg    calcitRIOL capsule 0.25 mcg    carvediloL tablet 3.125 mg    cycloSPORINE modified (NEORAL) capsule 75 mg    NIFEdipine 24 hr tablet 60 mg    nitroGLYCERIN SL tablet 0.4 mg    ondansetron disintegrating tablet 8 mg    oxyCODONE immediate release tablet 5 mg    pantoprazole injection 40 mg    polyethylene glycol packet 17 g    pregabalin capsule 50 mg    sertraline tablet 25 mg    sodium chloride 0.9% flush 10 mL    sucralfate 100 mg/mL suspension 1 g    tiZANidine tablet 4 mg    zolpidem tablet 5 mg     Family History       Problem Relation (Age of Onset)    Breast cancer Mother, Maternal Grandmother    Cancer Mother (38)    Cataracts Cousin    Heart disease Maternal Grandmother    Hypertension Son          Tobacco Use    Smoking status: Never     Passive exposure: Never    Smokeless tobacco: Never   Substance and Sexual Activity    Alcohol use: Never     Alcohol/week: 0.0 standard drinks of alcohol    Drug use: No    Sexual activity: Not Currently     Partners: Male     Birth control/protection: See Surgical Hx     Review of Systems   Constitutional:  Negative for chills and diaphoresis.   Respiratory:  Positive for chest tightness. Negative for cough and choking.    Gastrointestinal:  Positive for abdominal pain, nausea and vomiting.   Musculoskeletal:  Positive for back pain.   Neurological:  Negative for light-headedness, numbness and headaches.   Psychiatric/Behavioral:  The patient is not nervous/anxious.    All other systems reviewed and are negative.    Objective:     Vital Signs (Most Recent):  Temp: 98.7 °F (37.1 °C) (04/25/25 0345)  Pulse: 65 (04/25/25 0345)  Resp: 16 (04/25/25 0345)  BP: 108/65 (04/25/25 0345)  SpO2: (!) 93 % (04/25/25 0345) Vital Signs (24h Range):  Temp:  [97.8 °F (36.6 °C)-98.7 °F (37.1 °C)] 98.7 °F  "(37.1 °C)  Pulse:  [] 65  Resp:  [15-29] 16  SpO2:  [93 %-100 %] 93 %  BP: (108-183)/(65-99) 108/65     No data found.  There is no height or weight on file to calculate BMI.      Intake/Output Summary (Last 24 hours) at 4/25/2025 0438  Last data filed at 4/25/2025 0003  Gross per 24 hour   Intake --   Output 1000 ml   Net -1000 ml          Physical Exam       Significant Labs:  CBC:  Recent Labs   Lab 04/24/25  1343 04/25/25  0319   WBC 3.65* 3.70*   RBC 3.30* 3.43*   HGB 9.8* 10.1*   HCT 29.6* 30.1*    204   MCV 90 88   MCH 29.7 29.4   MCHC 33.1 33.6     BNP:  Recent Labs   Lab 04/24/25  1343   *     CMP:  Recent Labs   Lab 04/24/25  1343   CALCIUM 8.7   ALBUMIN 3.6      K 3.3*   CO2 20*      BUN 36*   CREATININE 3.4*   ALKPHOS 136   ALT 29   AST 39   BILITOT 0.5      Coagulation:   No results for input(s): "PT", "INR", "APTT" in the last 168 hours.  LDH:  No results for input(s): "LDH" in the last 72 hours.  Microbiology:  Microbiology Results (last 7 days)       Procedure Component Value Units Date/Time    Blood culture [[id number]] Collected: 04/25/25 0324    Order Status: Sent Specimen: Blood     Blood culture [[id number]] Collected: 04/25/25 0319    Order Status: Sent Specimen: Blood             I have reviewed all pertinent labs within the past 24 hours.    Diagnostic Results:  I have reviewed and interpreted all pertinent imaging results/findings within the past 24 hours.    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 Damon is a 68 y/o AAF s/p OHTx 5/27/93 at [facility], s/p PPM same date, mild anemia and leukopenia, OA, cervical spine DJD with radiculopathy and chronic neck pain. Four years ago after her annual check up she was found to have BRCA, underwent excision, chemo/radiation, had a rough time of it, with some complications related to it, however recovered well. Transferred from OSH ED due to chest " pain. She initially was at the [place] [facility] for pain management procedure for chronic back pain. In preprocedure area complained of nausea and chest pain having taken a sublingual nitro prior to arrival. Reported intermittent chest pain > 1 week. Took all regular medications on an empty stomach and per notes had n/v after starting IV. ECG with repol abnormalities. Noted to be hypokalemic and hypomagnesemic. Troponin I mildly elevated.     Plan:   - HS ordered. ECG repeated bifascicular block noted, old ST depression high lateral leads, T wave inversion anterolateral leads.   - echo ordered  - will keep NPO and will discuss with team if cardiac stress test preferred in the setting of CKD5.   - continue asa, coreg, nifedipine    Hypomagnesemia  -replacing Mag and monitor renal function    Hypokalemia  -replacing electrolytes as needed while monitoring renal function    Anemia  Stable, no acute signs of bleeding    Chronic right-sided thoracic back pain  -continue home medications    Chronic idiopathic constipation  - prn laxatives    Abnormal serum thyroid stimulating hormone (TSH) level  -repeating thyroid panel    Anemia associated with stage 5 chronic renal failure  Stable renal function    Immunocompromised state due to drug therapy  S/p heart transplant    Essential hypertension  -uncontrolled. Continue adjusting medications        Marshall Anand MD  Heart Transplant  [facility] - Telemetry Stepdown

## 2025-04-25 NOTE — PROGRESS NOTES
Dc note     Met with pt in pt's room to discuss anticipated dc tomorrow, 4/26/25. Pt was AAOx4 with pleasant affect. Pt was amenable to dc tomorrow. Informed pt that NEMT will be arranged to transport home, per her request. Confirmed that pt has [facility] (#[id number]). Also, emailed [facility], pt's insurance info. Informed pt's Elisa GOSS, of pt's anticipated dc tomorrow. Pt did not verbalize any further dc needs/concerns. No dc needs indicated by medical team. Pt will have support of staff at [facility] and friend, Sandee, as indicated. Pt reports coping well and denies further needs, questions, concerns at this time and none indicated. Providing psychosocial and counseling support, education, resources, assistance and discharge planning as indicated. Informed team that pt will need NEMT arranged with [facility] ([phone]) with insurance info via sticky note and on report for on-call HIMA Knapp. HIMA remains available.

## 2025-04-25 NOTE — PROGRESS NOTES
Rounds    SW attended MDT rounds with physicians, fellows, APPs, pre and post transplant coordinators, and VAD coordinators. Discussed plan of care and discharge planning. Pt had multiple falls pta and medical team verbalized concerns. SW called pt's Elisa GOSS, ([phone]) to discuss pt's fall hx and safety concerns. Pt's DIL stated that she resides in [place] but calls pt often. Pt's DIL stated that pt is cognizant. Pt's DIL did not verbalize any safety concerns regarding pt when asked by SW. Pt's DIL was aware of one fall and stated pt was trying to climb to reach for something, which they advised pt not to climb again. Pt's DIL stated that pt did not fall due to LOB. Discussed safety concerns and LT-PCS with pt. Inquired if [facility] would allow PCAs/sitters at their facility, which pt confirmed they would allow and other residents have sitters. Provided pt with LT-PCS brochure with contact number to complete application. Pt stated that she will provide HIMA Curry, at [facility] with the information to assist with completion of LT-PCS aleksandar. Also, l/m for Antoinette at [facility] ([phone]) requesting call back.

## 2025-04-25 NOTE — CONSULTS
[facility] VASCULAR ACCESS NOTE       Bed:8098/8098 A    20G x 1.75IN PIV placed in Right Upper Arm by [facility] using Ultrasound Guidance.    Indication: PVA  Attempts: 1    Huseyin Sam RN

## 2025-04-25 NOTE — PROGRESS NOTES
04/25/25 1241   Vital Signs   Pulse 80   Heart Rate Source Monitor   Resp 18   BP (!) 88/53   MAP (mmHg) 64   BP Location Right arm   BP Method Automatic   Patient Position Lying   Vitals   Systolic Average 0   Diastolic Average 0   Heart Rate/Pulse Average 0     Pt aaox4 with abc's intact. pt refusing second dose of nitro. pt rates chest pain 3/10 that radiated to left arm . Md notified. Will cont poc

## 2025-04-25 NOTE — ASSESSMENT & PLAN NOTE
Ms. Damon is a 68 y/o AAF s/p OHTx 5/27/93 at [facility], s/p PPM same date, mild anemia and leukopenia, OA, cervical spine DJD with radiculopathy and chronic neck pain. Four years ago after her annual check up she was found to have BRCA, underwent excision, chemo/radiation, had a rough time of it, with some complications related to it, however recovered well. Transferred from [facility] ED due to chest pain. She initially was at the [place] for pain management procedure for chronic back pain. In preprocedure area complained of nausea and chest pain having taken a sublingual nitro prior to arrival. Reported intermittent chest pain > 1 week. Took all regular medications on an empty stomach and per notes had n/v after starting IV. ECG with repol abnormalities. Noted to be hypokalemic and hypomagnesemic. Troponin I mildly elevated.     Plan:   - HS ordered. ECG repeated bifascicular block noted, old ST depression high lateral leads, T wave inversion anterolateral leads.   - echo ordered. Done and unrevealing.   - continue asa, coreg, nifedipine

## 2025-04-26 ENCOUNTER — ANESTHESIA (OUTPATIENT)
Dept: SURGERY | Facility: HOSPITAL | Age: 70
End: 2025-04-26
Payer: MEDICARE

## 2025-04-26 ENCOUNTER — ANESTHESIA EVENT (OUTPATIENT)
Dept: SURGERY | Facility: HOSPITAL | Age: 70
End: 2025-04-26
Payer: MEDICARE

## 2025-04-26 LAB
ABSOLUTE EOSINOPHIL (OHS): 0.07 K/UL
ABSOLUTE MONOCYTE (OHS): 0.23 K/UL (ref 0.3–1)
ABSOLUTE NEUTROPHIL COUNT (OHS): 1.44 K/UL (ref 1.8–7.7)
ANION GAP (OHS): 11 MMOL/L (ref 8–16)
ASCENDING AORTA: 3.6 CM
BASOPHILS # BLD AUTO: 0.01 K/UL
BASOPHILS NFR BLD AUTO: 0.4 %
BSA FOR ECHO PROCEDURE: 1.7 M2
BUN SERPL-MCNC: 31 MG/DL (ref 8–23)
CALCIUM SERPL-MCNC: 8.5 MG/DL (ref 8.7–10.5)
CHLORIDE SERPL-SCNC: 106 MMOL/L (ref 95–110)
CO2 SERPL-SCNC: 24 MMOL/L (ref 23–29)
CREAT SERPL-MCNC: 2.9 MG/DL (ref 0.5–1.4)
EJECTION FRACTION: 55 %
ERYTHROCYTE [DISTWIDTH] IN BLOOD BY AUTOMATED COUNT: 15.7 % (ref 11.5–14.5)
GFR SERPLBLD CREATININE-BSD FMLA CKD-EPI: 17 ML/MIN/1.73/M2
GLUCOSE SERPL-MCNC: 89 MG/DL (ref 70–110)
HCT VFR BLD AUTO: 28.4 % (ref 37–48.5)
HGB BLD-MCNC: 9.2 GM/DL (ref 12–16)
IMM GRANULOCYTES # BLD AUTO: 0.01 K/UL (ref 0–0.04)
IMM GRANULOCYTES NFR BLD AUTO: 0.4 % (ref 0–0.5)
LYMPHOCYTES # BLD AUTO: 0.81 K/UL (ref 1–4.8)
MAGNESIUM SERPL-MCNC: 2 MG/DL (ref 1.6–2.6)
MCH RBC QN AUTO: 29.3 PG (ref 27–31)
MCHC RBC AUTO-ENTMCNC: 32.4 G/DL (ref 32–36)
MCV RBC AUTO: 90 FL (ref 82–98)
NUCLEATED RBC (/100WBC) (OHS): 0 /100 WBC
PLATELET # BLD AUTO: 148 K/UL (ref 150–450)
PMV BLD AUTO: 10.3 FL (ref 9.2–12.9)
POTASSIUM SERPL-SCNC: 3.9 MMOL/L (ref 3.5–5.1)
RBC # BLD AUTO: 3.14 M/UL (ref 4–5.4)
RELATIVE EOSINOPHIL (OHS): 2.7 %
RELATIVE LYMPHOCYTE (OHS): 31.5 % (ref 18–48)
RELATIVE MONOCYTE (OHS): 8.9 % (ref 4–15)
RELATIVE NEUTROPHIL (OHS): 56.1 % (ref 38–73)
SINUS: 3.4 CM
SODIUM SERPL-SCNC: 141 MMOL/L (ref 136–145)
STJ: 3.1 CM
TROPONIN I SERPL HS-MCNC: 19 NG/L
WBC # BLD AUTO: 2.57 K/UL (ref 3.9–12.7)

## 2025-04-26 PROCEDURE — D9220A PRA ANESTHESIA: Mod: HCNC,ANES,, | Performed by: ANESTHESIOLOGY

## 2025-04-26 PROCEDURE — 94761 N-INVAS EAR/PLS OXIMETRY MLT: CPT | Mod: HCNC

## 2025-04-26 PROCEDURE — 25000003 PHARM REV CODE 250: Mod: HCNC | Performed by: NURSE ANESTHETIST, CERTIFIED REGISTERED

## 2025-04-26 PROCEDURE — 27000221 HC OXYGEN, UP TO 24 HOURS: Mod: HCNC

## 2025-04-26 PROCEDURE — 20600001 HC STEP DOWN PRIVATE ROOM: Mod: HCNC

## 2025-04-26 PROCEDURE — 99499 UNLISTED E&M SERVICE: CPT | Mod: HCNC,,, | Performed by: THORACIC SURGERY (CARDIOTHORACIC VASCULAR SURGERY)

## 2025-04-26 PROCEDURE — 36000704 HC OR TIME LEV I 1ST 15 MIN: Mod: HCNC | Performed by: THORACIC SURGERY (CARDIOTHORACIC VASCULAR SURGERY)

## 2025-04-26 PROCEDURE — 63600175 PHARM REV CODE 636 W HCPCS: Mod: HCNC | Performed by: STUDENT IN AN ORGANIZED HEALTH CARE EDUCATION/TRAINING PROGRAM

## 2025-04-26 PROCEDURE — 63600175 PHARM REV CODE 636 W HCPCS: Mod: HCNC | Performed by: NURSE ANESTHETIST, CERTIFIED REGISTERED

## 2025-04-26 PROCEDURE — 25000003 PHARM REV CODE 250: Mod: HCNC | Performed by: INTERNAL MEDICINE

## 2025-04-26 PROCEDURE — 85025 COMPLETE CBC W/AUTO DIFF WBC: CPT | Mod: HCNC | Performed by: STUDENT IN AN ORGANIZED HEALTH CARE EDUCATION/TRAINING PROGRAM

## 2025-04-26 PROCEDURE — 99900035 HC TECH TIME PER 15 MIN (STAT): Mod: HCNC

## 2025-04-26 PROCEDURE — 36415 COLL VENOUS BLD VENIPUNCTURE: CPT | Mod: HCNC | Performed by: STUDENT IN AN ORGANIZED HEALTH CARE EDUCATION/TRAINING PROGRAM

## 2025-04-26 PROCEDURE — D9220A PRA ANESTHESIA: Mod: HCNC,CRNA,, | Performed by: NURSE ANESTHETIST, CERTIFIED REGISTERED

## 2025-04-26 PROCEDURE — 25000003 PHARM REV CODE 250: Mod: HCNC | Performed by: STUDENT IN AN ORGANIZED HEALTH CARE EDUCATION/TRAINING PROGRAM

## 2025-04-26 PROCEDURE — B246ZZ4 ULTRASONOGRAPHY OF RIGHT AND LEFT HEART, TRANSESOPHAGEAL: ICD-10-PCS | Performed by: INTERNAL MEDICINE

## 2025-04-26 PROCEDURE — 80048 BASIC METABOLIC PNL TOTAL CA: CPT | Mod: HCNC | Performed by: PHYSICIAN ASSISTANT

## 2025-04-26 PROCEDURE — 83735 ASSAY OF MAGNESIUM: CPT | Mod: HCNC | Performed by: STUDENT IN AN ORGANIZED HEALTH CARE EDUCATION/TRAINING PROGRAM

## 2025-04-26 PROCEDURE — 37000008 HC ANESTHESIA 1ST 15 MINUTES: Mod: HCNC | Performed by: THORACIC SURGERY (CARDIOTHORACIC VASCULAR SURGERY)

## 2025-04-26 PROCEDURE — 84484 ASSAY OF TROPONIN QUANT: CPT | Mod: HCNC | Performed by: PHYSICIAN ASSISTANT

## 2025-04-26 RX ORDER — PROPOFOL 10 MG/ML
VIAL (ML) INTRAVENOUS
Status: DISCONTINUED | OUTPATIENT
Start: 2025-04-26 | End: 2025-04-26

## 2025-04-26 RX ORDER — DEXMEDETOMIDINE HYDROCHLORIDE 100 UG/ML
INJECTION, SOLUTION INTRAVENOUS
Status: DISCONTINUED | OUTPATIENT
Start: 2025-04-26 | End: 2025-04-26

## 2025-04-26 RX ADMIN — DEXMEDETOMIDINE 12 MCG: 100 INJECTION, SOLUTION, CONCENTRATE INTRAVENOUS at 02:04

## 2025-04-26 RX ADMIN — ALUMINUM HYDROXIDE, MAGNESIUM HYDROXIDE, AND SIMETHICONE 30 ML: 200; 200; 20 SUSPENSION ORAL at 09:04

## 2025-04-26 RX ADMIN — CYCLOSPORINE 75 MG: 25 CAPSULE, LIQUID FILLED ORAL at 09:04

## 2025-04-26 RX ADMIN — ONDANSETRON 8 MG: 4 TABLET, ORALLY DISINTEGRATING ORAL at 05:04

## 2025-04-26 RX ADMIN — SODIUM CHLORIDE: 0.9 INJECTION, SOLUTION INTRAVENOUS at 02:04

## 2025-04-26 RX ADMIN — TIZANIDINE 4 MG: 4 TABLET ORAL at 09:04

## 2025-04-26 RX ADMIN — SUCRALFATE 1 G: 1 SUSPENSION ORAL at 12:04

## 2025-04-26 RX ADMIN — PREGABALIN 50 MG: 50 CAPSULE ORAL at 09:04

## 2025-04-26 RX ADMIN — SUCRALFATE 1 G: 1 SUSPENSION ORAL at 11:04

## 2025-04-26 RX ADMIN — ALUMINUM HYDROXIDE, MAGNESIUM HYDROXIDE, AND SIMETHICONE 30 ML: 200; 200; 20 SUSPENSION ORAL at 10:04

## 2025-04-26 RX ADMIN — CALCITRIOL CAPSULES 0.25 MCG 0.25 MCG: 0.25 CAPSULE ORAL at 09:04

## 2025-04-26 RX ADMIN — SUCRALFATE 1 G: 1 SUSPENSION ORAL at 05:04

## 2025-04-26 RX ADMIN — OXYCODONE 5 MG: 5 TABLET ORAL at 01:04

## 2025-04-26 RX ADMIN — PROPOFOL 50 MG: 10 INJECTION, EMULSION INTRAVENOUS at 02:04

## 2025-04-26 RX ADMIN — ALUMINUM HYDROXIDE, MAGNESIUM HYDROXIDE, AND SIMETHICONE 30 ML: 200; 200; 20 SUSPENSION ORAL at 05:04

## 2025-04-26 RX ADMIN — PANTOPRAZOLE SODIUM 40 MG: 40 TABLET, DELAYED RELEASE ORAL at 09:04

## 2025-04-26 RX ADMIN — ASPIRIN 81 MG: 81 TABLET, COATED ORAL at 09:04

## 2025-04-26 RX ADMIN — CARVEDILOL 3.12 MG: 3.12 TABLET, FILM COATED ORAL at 05:04

## 2025-04-26 RX ADMIN — SERTRALINE HYDROCHLORIDE 25 MG: 25 TABLET ORAL at 09:04

## 2025-04-26 NOTE — NURSING
Pt aaox4 with abc's intact. Pt denies NAD and none noted. Bed alarm on, at lowest, locked, sr up x2, call light within reach. Pt instructed to call for assistance. Will cont poc

## 2025-04-26 NOTE — PROGRESS NOTES
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 Being moved to ICU to get AYAAN to r/o aortic dissection (unable to get CTA due to CKD)    Continuous Infusions:  Scheduled Meds:   aluminum-magnesium hydroxide-simethicone  30 mL Oral QID (AC & HS)    aspirin  81 mg Oral Daily    calcitRIOL  0.25 mcg Oral Daily    carvediloL  3.125 mg Oral BID WM    cycloSPORINE modified (NEORAL)  75 mg Oral BID    NIFEdipine  60 mg Oral Daily    pantoprazole  40 mg Oral Daily    pregabalin  50 mg Oral BID    sertraline  25 mg Oral Daily    sucralfate  1 g Oral Q6H    tiZANidine  4 mg Oral BID     PRN Meds:  Current Facility-Administered Medications:     acetaminophen, 650 mg, Oral, Q4H PRN    aluminum-magnesium hydroxide-simethicone, 30 mL, Oral, Q6H PRN    nitroGLYCERIN, 0.4 mg, Sublingual, Q5 Min PRN    nitroGLYCERIN, 0.4 mg, Sublingual, Q5 Min PRN    ondansetron, 8 mg, Oral, Q8H PRN    oxyCODONE, 5 mg, Oral, Q4H PRN    polyethylene glycol, 17 g, Oral, BID PRN    sodium chloride 0.9%, 10 mL, Intravenous, PRN    zolpidem, 5 mg, Oral, Nightly PRN    Review of patient's allergies indicates:   Allergen Reactions    Dobutamine Other (See Comments)     Severe Left sided chest pain after dosage administered for Dobutamine Stress Test; itching    Lisinopril Swelling and Rash    Hydrocodone-acetaminophen Nausea Only    Augmentin [amoxicillin-pot clavulanate] Diarrhea    Zyvox [linezolid] Nausea And Vomiting     Objective:     Vital Signs (Most Recent):  Temp: 98.6 °F (37 °C) (04/26/25 1102)  Pulse: 71 (04/26/25  1440)  Resp: 16 (04/26/25 1415)  BP: (!) 143/73 (04/26/25 1440)  SpO2: 100 % (04/26/25 1440) Vital Signs (24h Range):  Temp:  [98 °F (36.7 °C)-98.9 °F (37.2 °C)] 98.6 °F (37 °C)  Pulse:  [66-73] 71  Resp:  [16-19] 16  SpO2:  [94 %-100 %] 100 %  BP: ()/(54-79) 143/73     No data found.  Body mass index is 26.69 kg/m².      Intake/Output Summary (Last 24 hours) at 4/26/2025 1453  Last data filed at 4/26/2025 1437  Gross per 24 hour   Intake 200 ml   Output --   Net 200 ml          Physical Exam  Constitutional:       General: She is not in acute distress.     Appearance: Normal appearance. She is not ill-appearing.   HENT:      Head: Normocephalic and atraumatic.   Cardiovascular:      Rate and Rhythm: Normal rate and regular rhythm.      Pulses: Normal pulses.      Heart sounds: Normal heart sounds.   Pulmonary:      Effort: Pulmonary effort is normal.      Breath sounds: Normal breath sounds.   Chest:      Comments: L ribcage tender to touch  Abdominal:      General: Abdomen is flat. There is no distension.      Palpations: Abdomen is soft.      Tenderness: There is no abdominal tenderness.   Musculoskeletal:      Right lower leg: No edema.      Left lower leg: No edema.   Skin:     General: Skin is warm and dry.   Neurological:      General: No focal deficit present.      Mental Status: She is alert and oriented to person, place, and time.   Psychiatric:         Mood and Affect: Mood normal.         Behavior: Behavior normal.            Significant Labs:  CBC:  Recent Labs   Lab 04/24/25  1343 04/25/25  0319 04/26/25  0533   WBC 3.65* 3.70* 2.57*   RBC 3.30* 3.43* 3.14*   HGB 9.8* 10.1* 9.2*   HCT 29.6* 30.1* 28.4*    204 148*   MCV 90 88 90   MCH 29.7 29.4 29.3   MCHC 33.1 33.6 32.4     BNP:  Recent Labs   Lab 04/24/25  1343   *     CMP:  Recent Labs   Lab 04/24/25  1343 04/25/25  0319 04/26/25  0533   CALCIUM 8.7 8.7 8.5*   ALBUMIN 3.6  --   --     144 141   K 3.3* 4.2 3.9   CO2 20* 18*  "24    111* 106   BUN 36* 33* 31*   CREATININE 3.4* 3.0* 2.9*   ALKPHOS 136  --   --    ALT 29  --   --    AST 39  --   --    BILITOT 0.5  --   --       Coagulation:   No results for input(s): "PT", "INR", "APTT" in the last 168 hours.  LDH:  No results for input(s): "LDH" in the last 72 hours.  Microbiology:  Microbiology Results (last 7 days)       Procedure Component Value Units Date/Time    Blood culture [[id number]]  (Normal) Collected: 04/25/25 0319    Order Status: Completed Specimen: Blood Updated: 04/26/25 0012     Blood Culture No Growth After 6 Hours    Blood culture [[id number]]  (Normal) Collected: 04/25/25 0324    Order Status: Completed Specimen: Blood Updated: 04/26/25 0012     Blood Culture No Growth After 6 Hours            I have reviewed all pertinent labs within the past 24 hours.    CrCl cannot be calculated (Unknown ideal weight.).    Diagnostic Results:  I have reviewed all pertinent imaging results/findings within the past 24 hours.  Assessment and Plan:     Ms. Damon is a 70 y/o AAF s/p OHTx 5/27/93 [place], s/p PPM same date, mild anemia and leukopenia, OA, cervical spine DJD with radiculopathy and chronic neck pain who presents for her 31st post annual transplant evaluation. Four years ago after her annual was found to have BRCA, underwent excision, chemo/radiation, had a rough time of it, with some complications related to it, however recovered well.      Transferred from [facility] ED due to chest pain. She initially was at the [facility] for pain management procedure for chronic back pain. In preprocedure area complained of nausea and chest pain having taken a sublingual nitro prior to arrival. Reported intermittent chest pain > 1 week. Took all regular medications on an empty stomach and per notes had n/v after starting IV. ECG with repol abnormalities. Noted to be hypokalemic and hypomagnesemic. Troponin I mildly elevated.     Off note, a few months ago was evaluated by  for " a reducible ventral hernia containing portion of the liver no bowel noted on exam or imaging. At that time also reported chronic nausea. Elective hernia repair deemed not emergent and if she should require intervention to be done at center with cardiac surgery and Cardiology heart failure services.      Cardiac PET 11/15/2024    The myocardial perfusion images show no evidence of ischemia or scar.    The whole heart absolute myocardial perfusion values were elevated at rest, low normal during stress and CFR is mildly reduced in part due to elevated resting flow.    CT attenuation images demonstrate no coronary calcifications and mild diffuse aortic calcifications in the ascending aorta, in the descending aorta and moderate calcfications in the aortic arch.    The gated perfusion images showed an ejection fraction of 56% at rest and 59% during stress. A normal ejection fraction is greater than 47%.    There is normal wall motion at rest and normal wall motion during stress.    The study's ECG is negative for ischemia.       TTE 07/20/2024:    Left Ventricle: The left ventricle is normal in size. Normal wall thickness. There is normal systolic function with a visually estimated ejection fraction of 55 - 60%. There is normal diastolic function.    Right Ventricle: Normal right ventricular cavity size. Wall thickness is normal. Systolic function is normal.    Left Atrium: Patent foramen ovale visualized with predominant right to left shunting indicated by saline contrast.    Tricuspid Valve: There is mild regurgitation.    IVC/SVC: Normal venous pressure at 3 mmHg.    The patient is status post cardiac transplantation.  PFO not present with bubble alone, but with valsalva had very small bubbles come across         Cxr: Comparison is made to January 4, 2023.  Electrical lead overlies the lower chest and upper abdomen.  Surgical clips overlie the upper abdomen.  Mediastinal silhouette is prominent.  The lungs are clear.   No pneumothorax or significant pleural effusion     DSE 05/24/21:  The left ventricle is normal in size with concentric remodeling and normal systolic function.  The patient reached the end of the protocol.  There were no arrhythmias during stress.  Severe left atrial enlargement.  The estimated ejection fraction is 55%.  Grade II left ventricular diastolic dysfunction.  Normal right ventricular size with normal right ventricular systolic function.  Mild-to-moderate mitral regurgitation.  Mild to moderate tricuspid regurgitation.  Normal central venous pressure (3 mmHg).  The estimated PA systolic pressure is 39 mmHg.  The stress echo portion of this study is negative for myocardial ischemia.  Severe left atrial enlargement.  The ECG portion of this study is negative for myocardial ischemia.    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 Damon is a 68 y/o AAF s/p OHTx 5/27/93 at [facility], s/p PPM same date, mild anemia and leukopenia, OA, cervical spine DJD with radiculopathy and chronic neck pain. Four years ago after her annual check up she was found to have BRCA, underwent excision, chemo/radiation, had a rough time of it, with some complications related to it, however recovered well. Transferred from [facility] ED due to chest pain. She initially was at the [place] for pain management procedure for chronic back pain. In preprocedure area complained of nausea and chest pain having taken a sublingual nitro prior to arrival. Reported intermittent chest pain > 1 week. Took all regular medications on an empty stomach and per notes had n/v after starting IV. ECG with repol abnormalities. Noted to be hypokalemic and hypomagnesemic. Troponin I mildly elevated.     Plan:   - HS ordered. ECG repeated bifascicular block noted, old ST depression high lateral leads, T wave inversion anterolateral leads.   - echo ordered. Done and unrevealing.   - continue asa, coreg,  nifedipine    Chronic nausea  Chronic N/V. Obtaining KUB. Replacing electrolytes    Hypomagnesemia  -replacing Mag and monitor renal function    Hypokalemia  -replacing electrolytes as needed while monitoring renal function    Anemia  Stable, no acute signs of bleeding    Chronic right-sided thoracic back pain  -continue home medications    Chronic idiopathic constipation  - prn laxatives    Abnormal serum thyroid stimulating hormone (TSH) level  -repeating thyroid panel    Chest pain  Still having CP that shoots to her shoulder blade that she describes as new as compared to her chronic pain. Will get AYAAN today to r/o dissection    Anemia associated with stage 5 chronic renal failure  Stable renal function    Immunocompromised state due to drug therapy  S/p heart transplant    Essential hypertension  -uncontrolled. Continue adjusting medications        Huber Novak PA-C  Heart Transplant  [facility]

## 2025-04-26 NOTE — H&P
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 Pain has been present for weeks to months. Chest discomfort is located in different positions, left/right/central chest. BP elevated, so primary team asking for emergent AYAAN to evaluate for aortic dissection. Baseline Cr 2.9, unable to perform CTA.     Anticoagulant/antiplatelets: aspirin 81mg qd  ECG: NSR, RBBB, LAFB, NSTTA    Dysphagia or odynophagia:  No  Liver Disease, esophageal disease, or known varices:  No  Upper GI Bleeding: No  Prior neck surgery or radiation:  No. Radiation to L axilla.  Last oral intake: yesterday before midnight  Able to move neck in all directions:  Yes    Medications - Outpatient  Prior to Admission medications    Medication Sig Start Date End Date Taking? Authorizing Provider   aspirin (ECOTRIN) 81 MG EC tablet Take 1 tablet (81 mg total) by mouth once daily. 7/30/24 7/30/25  Gavin Paz MD   calcitRIOL (ROCALTROL) 0.25 MCG Cap Take 1 capsule (0.25 mcg total) by mouth once daily. 1/24/25   Carter Crawford MD   carvediloL (COREG) 3.125 MG tablet Take 1 tablet (3.125 mg total) by mouth 2 (two) times daily with meals. 3/6/25   Elis Wick MD   cycloSPORINE modified, NEORAL, (NEORAL) 25 MG capsule Take 3 capsules (75 mg total) by mouth 2 (two) times daily. 3/12/25   Eugene Ritchie MD   furosemide (LASIX) 40 MG tablet Take 1 tablet (40 mg total) by mouth once daily. 2/5/25 1/31/26  Sharyn Hawkins MD   mupirocin (BACTROBAN) 2 % ointment Apply topically 2 (two) times daily. 2/24/25   Chelsea Collado NP   NIFEdipine (PROCARDIA-XL) 60 MG (OSM) 24 hr tablet Take 1 tablet (60  mg total) by mouth once daily. 2/5/25   Sharyn Hawkins MD   nitroGLYCERIN (NITROSTAT) 0.4 MG SL tablet PLACE 1 TABLET UNDER THE TONGUE EVERY 5 MINS AS NEEDED FOR CHEST PAIN FOR UP TO 3 DOSES.IF CONTINUED HEART PAIN/PRESSURE AFTER 1/9/25   Elis Wick MD   ondansetron (ZOFRAN) 4 MG tablet Take 1 tablet (4 mg total) by mouth every 12 (twelve) hours as needed for Nausea. 3/6/25   Elis Wick MD   oxyCODONE-acetaminophen (PERCOCET) 5-325 mg per tablet Take 1 tablet by mouth every 4 (four) hours as needed for Pain. 2/18/25   Sami Cochran MD   pantoprazole (PROTONIX) 20 MG tablet TAKE 1 TABLET EVERY DAY 3/6/25   Elis Wick MD   patiromer calcium sorbitex (VELTASSA) 8.4 gram PwPk Take 1 packet (8.4 g total) by mouth once daily. 2/2/25 5/3/25  Elis Wick MD   polyethylene glycol (GLYCOLAX) 17 gram PwPk Take 17 g by mouth once daily.    Provider, Historical   pregabalin (LYRICA) 50 MG capsule TAKE 1 CAPSULE TWICE DAILY 3/6/25   Elis Wick MD   sertraline (ZOLOFT) 25 MG tablet Take 1 tablet (25 mg total) by mouth once daily. 4/15/25 4/15/26  Elis Wick MD   sodium bicarbonate 650 MG tablet Take 1 tablet (650 mg total) by mouth 2 (two) times daily. 3/3/25   Carter Crawford MD   temazepam (RESTORIL) 30 mg capsule Take 1 capsule (30 mg total) by mouth nightly as needed for Insomnia. 4/15/25   Elis Wick MD   tiZANidine (ZANAFLEX) 2 MG tablet Take 2 tablets (4 mg total) by mouth 2 (two) times a day. 1/16/25 7/15/25  Elis Wick MD   vitamin E 400 UNIT capsule Take 400 Units by mouth once daily.    Provider, Historical       Medications - Current  Scheduled Meds:   aluminum-magnesium hydroxide-simethicone  30 mL Oral QID (AC & HS)    aspirin  81 mg Oral Daily    calcitRIOL  0.25 mcg Oral Daily    carvediloL  3.125 mg Oral BID WM    cycloSPORINE modified (NEORAL)  75 mg Oral BID    NIFEdipine  60 mg Oral Daily    pantoprazole  40 mg Oral Daily     pregabalin  50 mg Oral BID    sertraline  25 mg Oral Daily    sucralfate  1 g Oral Q6H    tiZANidine  4 mg Oral BID     Continuous Infusions:    Allergies  Review of patient's allergies indicates:   Allergen Reactions    Dobutamine Other (See Comments)     Severe Left sided chest pain after dosage administered for Dobutamine Stress Test; itching    Lisinopril Swelling and Rash    Hydrocodone-acetaminophen Nausea Only    Augmentin [amoxicillin-pot clavulanate] Diarrhea    Zyvox [linezolid] Nausea And Vomiting     Past Medical History  Past Medical History:   Diagnosis Date    Abdominal wall hernia     CT Renal 6/11/2018---Small fat containing superior ventral abdominal wall hernia at the epicardial pacing lead site.    Abnormal mammogram 10/12/2021    Acute cystitis without hematuria 05/10/2022    Acute ischemic right middle cerebral artery (MCA) stroke 07/20/2024    Adverse drug reaction 11/12/2024    GRACE (acute kidney injury) 11/22/2021    Anxiety     Aphasia 07/19/2024    Arthritis     ZEN HIPS    Breast cancer in female 08/2021    LEFT BREAST    Breast mass, right 11/13/2024    RIGHT BREAST MASS (Problem)      Bronchitis 08/18/2016    Never smoked      C. difficile colitis 11/29/2021    Cellulitis of axilla, left 12/23/2021    Chronic diastolic heart failure 12/16/2021    Chronic kidney disease     stage 4, GFR 15-29 ml/min    Chronic midline low back pain without sciatica 06/18/2018    CKD (chronic kidney disease) stage 4, GFR 15-29 ml/min 04/20/2016    US Retro   5/16/2018---Mild cortical thinning and increased cortical echogenicity.  Findings can be seen with medical renal disease.  8/31/216--- Echogenic kidneys with diffuse cortical thinning suggesting  medical renal disease. 2 complex right renal cortical cysts.      Closed nondisplaced fracture of distal phalanx of left great toe with routine healing 10/22/2018    Coronary artery disease 1993    heart transplant    COVID-19 in immunocompromised patient  02/26/2024    Cystitis 05/10/2022    Depression     Encounter for blood transfusion     Encounter for palliative care 10/14/2024    Fibromyalgia     on Lyrica    Heart failure     native heart cardiomyopathy    Heart transplanted 1993    due to cardiomyopathy    History of hyperparathyroidism; Hyperparathyroidism, secondary renal     PT DENIES    Hypertension     Immune disorder     anti rejection meds    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    Impaired mobility 07/28/2022    Iron deficiency anemia 08/15/2017    Kidney stones     passed per pt    Obesity     Obesity (BMI 30.0-34.9) 07/22/2019    Other osteoporosis without current pathological fracture 08/30/2019    Other pancytopenia 05/06/2024    Parotitis, acute 09/19/2023    Pharyngitis 01/09/2019    Pneumonia due to infectious organism 03/14/2024    PONV (postoperative nausea and vomiting)     Severe sepsis 11/22/2021    Shingles 2003 approx    left leg    Stage 4 chronic kidney disease 11/22/2021    Subclinical hypothyroidism 06/16/2023    Thrombocytopenia, unspecified 11/29/2021    Trouble in sleeping     Unresponsiveness 11/13/2024    Urinary incontinence      Past Surgical History  Past Surgical History:   Procedure Laterality Date    bladder implant Right 06/11/2024    BLADDER SURGERY  2015 approx    mesh - Dr Everett then 2nd reconstructive sx Dr Onofre    BREAST BIOPSY Bilateral     NEGATIVE    BREAST BIOPSY Right 10/31/2022    benign    BREAST LUMPECTOMY Left 2021    BREAST SURGERY Left 09/28/2015    Bx - benign    BREAST SURGERY Right 12/2015    Bx benign    CARDIAC PACEMAKER REMOVAL Left 06/26/2014    Pacer defirillator removed. Put in 1993 aat time of heart transplant    CARPAL TUNNEL RELEASE Left 03/03/2015    Dr. Hall    COLONOSCOPY N/A 02/25/2021    Procedure: COLONOSCOPY;  Surgeon: Freida Ramirez MD;  Location: [facility];  Service: Endoscopy;  Laterality: N/A;    CYSTOCELE REPAIR      Twice with mesh removal    EPIDURAL  STEROID INJECTION INTO CERVICAL SPINE N/A 02/02/2023    Procedure: T11/T12 IL HELLEN;  Surgeon: Jassi Pierre MD;  [facility];  Service: Pain Management;  Laterality: N/A;    EPIDURAL STEROID INJECTION INTO CERVICAL SPINE N/A 9/16/2024    Procedure: T11/T12 IL HELLEN;  Surgeon: Jassi Pierre MD;  [facility];  Service: Pain Management;  Laterality: N/A;    HEART TRANSPLANT  1993    HERNIA REPAIR Right 1971 approx    Inguinal    HYSTERECTOMY  1983    vag hyst /LSO     IMPLANTATION OF PERMANENT SACRAL NERVE STIMULATOR N/A 06/11/2024    Procedure: INSERTION, NEUROSTIMULATOR, PERMANENT, SACRAL;  Surgeon: Sami Cochran MD;  Location: [facility] OR;  Service: Urology;  Laterality: N/A;    INCISION AND DRAINAGE OF ABSCESS Left 12/24/2021    Procedure: INCISION AND DRAINAGE, ABSCESS;  Surgeon: Joseph Longo MD;  Location: [facility] OR;  Service: General;  Laterality: Left;    INJECTION OF ANESTHETIC AGENT AROUND MEDIAL BRANCH NERVES INNERVATING LUMBAR FACET JOINT Right 10/19/2022    Procedure: Right L4/L5 and L5/S1 MBB;  Surgeon: Jassi Pierre MD;  Location: [facility];  Service: Pain Management;  Laterality: Right;    INJECTION OF ANESTHETIC AGENT AROUND MEDIAL BRANCH NERVES INNERVATING LUMBAR FACET JOINT Right 11/09/2022    Procedure: Right L4/L5 and L5/S1 MBB;  Surgeon: Jassi Pierre MD;  Location: [facility];  Service: Pain Management;  Laterality: Right;    INJECTION OF ANESTHETIC AGENT AROUND MEDIAL BRANCH NERVES INNERVATING LUMBAR FACET JOINT Left 2/6/2025    Procedure: Left L4-5 and L5-S1 MBB #1 with local;  Surgeon: Jassi Pierre MD;  Location: [facility];  Service: Pain Management;  Laterality: Left;    INJECTION OF ANESTHETIC AGENT AROUND MEDIAL BRANCH NERVES INNERVATING LUMBAR FACET JOINT Left 3/19/2025    Procedure: Left L4-5 and L5-S1 MBB #2 with local;  Surgeon: Jassi Pierre MD;  Location: [facility];  Service: Pain Management;  Laterality: Left;     INJECTION OF ANESTHETIC AGENT INTO SACROILIAC JOINT Right 08/22/2022    Procedure: Right SIJ Injection Right L5/S1 Facte Injection;  Surgeon: Jassi Pierre MD;  [facility];  Service: Pain Management;  Laterality: Right;    INSERTION OF TUNNELED CENTRAL VENOUS CATHETER (CVC) WITH SUBCUTANEOUS PORT N/A 11/09/2021    Procedure: EVQNENDHE-VBNR-C-CATH;  Surgeon: Christoph Douglas MD;  Location: [facility] OR;  Service: General;  Laterality: N/A;    RADIOFREQUENCY ABLATION Right 12/5/2024    Procedure: Right L4-5 and L5-S1 RFA;  Surgeon: Jassi Pierre MD;  Location: [facility];  Service: Pain Management;  Laterality: Right;    RADIOFREQUENCY THERMOCOAGULATION Right 12/07/2022    Procedure: Right L4/L5 and L5/S1 Lumbar RFA;  Surgeon: Jassi Pierre MD;  [facility];  Service: Pain Management;  Laterality: Right;    REMOVAL OF ELECTRODE LEAD OF SACRAL NERVE STIMULATOR N/A 2/18/2025    Procedure: REMOVAL, ELECTRODE LEAD, SACRAL NERVE STIMULATOR;  Surgeon: Sami Cochran MD;  Location: [facility];  Service: Urology;  Laterality: N/A;    REMOVAL OF VASCULAR ACCESS PORT      ROBOT-ASSISTED LAPAROSCOPIC ABDOMINAL SACROCOLPOPEXY N/A 08/10/2023    Procedure: ROBOTIC SACROCOLPOPEXY, ABDOMEN;  Surgeon: PRANAY Villalobos MD;  Location: [facility];  Service: OB/GYN;  Laterality: N/A;    ROBOT-ASSISTED LAPAROSCOPIC OOPHORECTOMY Right 08/10/2023    Procedure: ROBOTIC OOPHORECTOMY;  Surgeon: PRANAY Villalobos MD;  Location: [facility] OR;  Service: OB/GYN;  Laterality: Right;    SENTINEL LYMPH NODE BIOPSY Left 10/12/2021    Procedure: BIOPSY, LYMPH NODE, SENTINEL;  Surgeon: Christoph Douglas MD;  Location: [facility] OR;  Service: General;  Laterality: Left;    TOE SURGERY      XI ROBOTIC URETHROPEXY N/A 08/10/2023    Procedure: XI ROBOTIC URETHROPEXY;  Surgeon: PRANAY Villalobos MD;  Location: [facility] OR;  Service: OB/GYN;  Laterality: N/A;     Physical Examination   Vital Signs  Vitals  Temp: 98.6 °F (37 °C)  Temp  "Source: Oral  Pulse: 71  Heart Rate Source: Monitor  Resp: 18  SpO2: (!) 94 %  BP: (!) 115/57  MAP (mmHg): 82  BP Location: Right arm  BP Method: Automatic  Patient Position: Lying    24 Hour VS Range  Temp:  [98 °F (36.7 °C)-98.9 °F (37.2 °C)]   Pulse:  [66-74]   Resp:  [16-19]   BP: ()/(54-79)   SpO2:  [94 %-98 %]   No intake or output data in the 24 hours ending 04/26/25 1346        Data     Recent Labs   Lab 04/24/25  1343 04/25/25  0319 04/26/25  0533   WBC 3.65* 3.70* 2.57*   HGB 9.8* 10.1* 9.2*   HCT 29.6* 30.1* 28.4*    204 148*      No results for input(s): "PROTIME", "INR" in the last 168 hours.   Recent Labs   Lab 04/24/25  1343 04/25/25 0319 04/26/25  0533    144 141   K 3.3* 4.2 3.9    111* 106   CO2 20* 18* 24   BUN 36* 33* 31*   CREATININE 3.4* 3.0* 2.9*   ANIONGAP 14 15 11   CALCIUM 8.7 8.7 8.5*      Assessment & Plan     AYAAN for evaluation of aortic dissection  -No absolute contraindications of esophageal stricture, tumor, perforation, laceration,or diverticulum and/or active GI bleed  -The risks, benefits & alternatives of the procedure were explained to the patient.   -The risks of transesophageal echo include but are not limited to:  Dental trauma, esophageal trauma/perforation, bleeding, laryngospasm/brochospasm, aspiration, sore throat/hoarseness, & dislodgement of the endotracheal tube/nasogastric tube (where applicable).    -The risks of ANES monitored sedation include hypotension, respiratory depression, arrhythmias, bronchospasm, & death.    -Informed consent was obtained. The patient is agreeable to proceed with the procedure and all questions and concerns addressed.    "

## 2025-04-26 NOTE — SUBJECTIVE & OBJECTIVE
Interval History: No overnight events but still complaining of Chest pain spreading to should blades. Being moved to ICU to get AYAAN to r/o aortic dissection (unable to get CTA due to CKD)    Continuous Infusions:  Scheduled Meds:   aluminum-magnesium hydroxide-simethicone  30 mL Oral QID (AC & HS)    aspirin  81 mg Oral Daily    calcitRIOL  0.25 mcg Oral Daily    carvediloL  3.125 mg Oral BID WM    cycloSPORINE modified (NEORAL)  75 mg Oral BID    NIFEdipine  60 mg Oral Daily    pantoprazole  40 mg Oral Daily    pregabalin  50 mg Oral BID    sertraline  25 mg Oral Daily    sucralfate  1 g Oral Q6H    tiZANidine  4 mg Oral BID     PRN Meds:  Current Facility-Administered Medications:     acetaminophen, 650 mg, Oral, Q4H PRN    aluminum-magnesium hydroxide-simethicone, 30 mL, Oral, Q6H PRN    nitroGLYCERIN, 0.4 mg, Sublingual, Q5 Min PRN    nitroGLYCERIN, 0.4 mg, Sublingual, Q5 Min PRN    ondansetron, 8 mg, Oral, Q8H PRN    oxyCODONE, 5 mg, Oral, Q4H PRN    polyethylene glycol, 17 g, Oral, BID PRN    sodium chloride 0.9%, 10 mL, Intravenous, PRN    zolpidem, 5 mg, Oral, Nightly PRN    Review of patient's allergies indicates:   Allergen Reactions    Dobutamine Other (See Comments)     Severe Left sided chest pain after dosage administered for Dobutamine Stress Test; itching    Lisinopril Swelling and Rash    Hydrocodone-acetaminophen Nausea Only    Augmentin [amoxicillin-pot clavulanate] Diarrhea    Zyvox [linezolid] Nausea And Vomiting     Objective:     Vital Signs (Most Recent):  Temp: 98.6 °F (37 °C) (04/26/25 1102)  Pulse: 71 (04/26/25 1440)  Resp: 16 (04/26/25 1415)  BP: (!) 143/73 (04/26/25 1440)  SpO2: 100 % (04/26/25 1440) Vital Signs (24h Range):  Temp:  [98 °F (36.7 °C)-98.9 °F (37.2 °C)] 98.6 °F (37 °C)  Pulse:  [66-73] 71  Resp:  [16-19] 16  SpO2:  [94 %-100 %] 100 %  BP: ()/(54-79) 143/73     No data found.  Body mass index is 26.69 kg/m².      Intake/Output Summary (Last 24 hours) at 4/26/2025  "1453  Last data filed at 4/26/2025 1437  Gross per 24 hour   Intake 200 ml   Output --   Net 200 ml          Physical Exam  Constitutional:       General: She is not in acute distress.     Appearance: Normal appearance. She is not ill-appearing.   HENT:      Head: Normocephalic and atraumatic.   Cardiovascular:      Rate and Rhythm: Normal rate and regular rhythm.      Pulses: Normal pulses.      Heart sounds: Normal heart sounds.   Pulmonary:      Effort: Pulmonary effort is normal.      Breath sounds: Normal breath sounds.   Chest:      Comments: L ribcage tender to touch  Abdominal:      General: Abdomen is flat. There is no distension.      Palpations: Abdomen is soft.      Tenderness: There is no abdominal tenderness.   Musculoskeletal:      Right lower leg: No edema.      Left lower leg: No edema.   Skin:     General: Skin is warm and dry.   Neurological:      General: No focal deficit present.      Mental Status: She is alert and oriented to person, place, and time.   Psychiatric:         Mood and Affect: Mood normal.         Behavior: Behavior normal.            Significant Labs:  CBC:  Recent Labs   Lab 04/24/25  1343 04/25/25  0319 04/26/25  0533   WBC 3.65* 3.70* 2.57*   RBC 3.30* 3.43* 3.14*   HGB 9.8* 10.1* 9.2*   HCT 29.6* 30.1* 28.4*    204 148*   MCV 90 88 90   MCH 29.7 29.4 29.3   MCHC 33.1 33.6 32.4     BNP:  Recent Labs   Lab 04/24/25  1343   *     CMP:  Recent Labs   Lab 04/24/25  1343 04/25/25  0319 04/26/25  0533   CALCIUM 8.7 8.7 8.5*   ALBUMIN 3.6  --   --     144 141   K 3.3* 4.2 3.9   CO2 20* 18* 24    111* 106   BUN 36* 33* 31*   CREATININE 3.4* 3.0* 2.9*   ALKPHOS 136  --   --    ALT 29  --   --    AST 39  --   --    BILITOT 0.5  --   --       Coagulation:   No results for input(s): "PT", "INR", "APTT" in the last 168 hours.  LDH:  No results for input(s): "LDH" in the last 72 hours.  Microbiology:  Microbiology Results (last 7 days)       Procedure Component " Value Units Date/Time    Blood culture [[id number]]  (Normal) Collected: 04/25/25 0319    Order Status: Completed Specimen: Blood Updated: 04/26/25 0012     Blood Culture No Growth After 6 Hours    Blood culture [[id number]]  (Normal) Collected: 04/25/25 0324    Order Status: Completed Specimen: Blood Updated: 04/26/25 0012     Blood Culture No Growth After 6 Hours            I have reviewed all pertinent labs within the past 24 hours.    CrCl cannot be calculated (Unknown ideal weight.).    Diagnostic Results:  I have reviewed all pertinent imaging results/findings within the past 24 hours.

## 2025-04-26 NOTE — NURSING
Pt aaox4 with abc's intact. pt b/p 89/54,hr 73. pt states that she feel dizzy and weak . pt denies chest pain at this time but states that she only feels pressure. pt states that she's not concerned about the pressure that she feels in her chest and that her concern at this time is her back pain. pt refusing PRN oxy but would like her scheduled Zanaflex. Attempted to reach team via secure chat/pager but was unsuccessful.will cont to attempt.  Charge nurse notified. Will cont poc

## 2025-04-26 NOTE — TRANSFER OF CARE
"Anesthesia Transfer of Care Note    Patient: Nadia Damon    Procedure(s) Performed: Procedure(s) (LRB):  Transesophageal echo (AYAAN) intra-procedure log documentation (N/A)    Patient location: ICU    Anesthesia Type: general    Transport from OR: Transported from OR on 2-3 L/min O2 by NC with adequate spontaneous ventilation. Continuous SpO2 monitoring in transport. Continuous ECG monitoring in transport    Post pain: adequate analgesia    Post assessment: no apparent anesthetic complications and tolerated procedure well    Post vital signs: stable    Level of consciousness: awake    Nausea/Vomiting: no nausea/vomiting    Complications: none    Transfer of care protocol was followed      Last vitals: Visit Vitals  BP (!) 115/57 (BP Location: Right arm, Patient Position: Lying)   Pulse 71   Temp 37 °C (98.6 °F) (Oral)   Resp 18   Ht 5' 2" (1.575 m)   LMP 06/20/1983 (Within Years)   SpO2 (!) 94%   BMI 26.69 kg/m²     "

## 2025-04-26 NOTE — ANESTHESIA POSTPROCEDURE EVALUATION
Anesthesia Post Evaluation    Patient: Nadia Damon    Procedure(s) Performed: Procedure(s) (LRB):  Transesophageal echo (AYAAN) intra-procedure log documentation (N/A)    Final Anesthesia Type: general      Patient location during evaluation: ICU  Patient participation: Yes- Able to Participate  Level of consciousness: awake and alert  Post-procedure vital signs: reviewed and stable  Pain management: adequate  Airway patency: patent    PONV status at discharge: No PONV  Anesthetic complications: no      Cardiovascular status: blood pressure returned to baseline  Respiratory status: unassisted  Hydration status: euvolemic  Follow-up not needed.              Vitals Value Taken Time   /66 04/26/25 15:27   Temp 37 °C (98.6 °F) 04/26/25 11:02   Pulse 73 04/26/25 16:12   Resp 16 04/26/25 16:12   SpO2 96 % 04/26/25 15:30   Vitals shown include unfiled device data.      No case tracking events are documented in the log.      Pain/Marixa Score: Pain Rating Prior to Med Admin: 8 (4/26/2025  1:28 AM)  Pain Rating Post Med Admin: 3 (4/25/2025  1:34 PM)  Marixa Score: 9 (4/26/2025  3:10 PM)

## 2025-04-26 NOTE — ASSESSMENT & PLAN NOTE
Still having CP that shoots to her shoulder blade that she describes as new as compared to her chronic pain. Will get AYAAN today to r/o dissection

## 2025-04-26 NOTE — NURSING
Pt transferred to Critical access hospital, [facility] on room air, [facility]. Transported with glasses and top dentures.

## 2025-04-26 NOTE — ANESTHESIA PREPROCEDURE EVALUATION
[facility]  Anesthesia Pre-Operative Evaluation         Patient Name: Nadia Damon  YOB: 1955  MRN: [id number]    SUBJECTIVE:     Pre-operative evaluation for Procedure(s) (LRB):  Transesophageal echo (AYAAN) intra-procedure log documentation (N/A)     04/26/2025    Nadia Damon is a 69 y.o. female w/ a significant PMHx of orthotopic heart transplant in 1993, HTN, GERD, CAD, CKD 5, PONV, PFO, hx of stroke. Presented with chest and back pain that radiates down the left arm. Concern for dissection.    Patient now presents for the above procedure(s).    Results for orders placed during the hospital encounter of 04/24/25    Echo    Interpretation Summary    Post cardiac transplantation study - OHTx 5/27/1993 at [facility]    Left Ventricle: The left ventricle is normal in size. Normal wall thickness. There is concentric remodeling. There is normal systolic function with a visually estimated ejection fraction of 55 - 60%. There is indeterminate diastolic function.    Right Ventricle: The right ventricle is normal in size. Wall thickness is normal. Systolic function is borderline low.    Tricuspid Valve: There is mild regurgitation.    Pulmonary Artery: No pulmonary hypertension.    Stress Echo 11/15/24:  Interpretation Summary         The myocardial perfusion images show no evidence of ischemia or scar.    The whole heart absolute myocardial perfusion values were elevated at rest, low normal during stress and CFR is mildly reduced in part due to elevated resting flow.    CT attenuation images demonstrate no coronary calcifications and mild diffuse aortic calcifications in the ascending aorta, in the descending aorta and moderate calcfications in the aortic arch.    The gated perfusion images showed an ejection fraction of 56% at rest and 59% during stress. A normal ejection fraction is greater than 47%.    There is normal wall motion at rest and normal wall motion during  stress.    The study's ECG is negative for ischemia.    LDA:        Peripheral IV - Single Lumen 04/25/25 0220 22 G;20 G Posterior;Right Hand (Active)   Site Assessment Clean;Intact;Dry;No redness;No swelling;No drainage;No warmth 04/26/25 0800   Line Securement Device Secured with sutureless device 04/25/25 2000   Extremity Assessment Distal to IV No abnormal discoloration;No redness;No warmth;No swelling 04/26/25 0800   Line Status Flushed;Saline locked 04/26/25 0800   Dressing Status Dry;Clean;Intact 04/26/25 0800   Dressing Intervention Integrity maintained 04/26/25 0800   Dressing Change Due 04/29/25 04/25/25 2000   Site Change Due 04/29/25 04/25/25 2000   Reason Not Rotated Patient refused 04/26/25 0800   Number of days: 1            Peripheral IV - Single Lumen 04/25/25 1043 20 G 1 3/4 in Anterior;Right Upper Arm (Active)   Site Assessment Clean;Dry;Intact;No redness;No swelling;No warmth;No drainage 04/26/25 0800   Line Securement Device Secured with sutureless device 04/25/25 2000   Extremity Assessment Distal to IV No abnormal discoloration;No redness;No swelling;No warmth 04/26/25 0800   Line Status Flushed;Saline locked 04/26/25 0800   Dressing Status Clean;Dry;Intact 04/26/25 0800   Dressing Intervention Integrity maintained 04/26/25 0800   Dressing Change Due 04/29/25 04/25/25 2000   Site Change Due 04/29/25 04/25/25 2000   Reason Not Rotated Not due 04/26/25 0800   Number of days: 1       Prev airway:  Intubation     Date/Time: 2/18/2025 10:32 AM     Performed by: Francisco J Smith CRNA  Authorized by: David Peterson MD    Intubation:     Induction:  Rapid sequence induction    Intubated:  Postinduction    Mask Ventilation:  Not attempted    Attempts:  1    Attempted By:  CRNA    Method of Intubation:  Direct    Blade:  Shahla 3    Laryngeal View Grade: Grade I - full view of cords      Difficult Airway Encountered?: No      Complications:  None    Airway Device:  Oral endotracheal tube     Airway Device Size:  7.0    Style/Cuff Inflation:  Cuffed (inflated to minimal occlusive pressure)    Tube secured:  20    Secured at:  The lips    Placement Verified By:  Capnometry    Complicating Factors:  None    Findings Post-Intubation:  BS equal bilateral       Drips: None documented.      Problem List[1]    Review of patient's allergies indicates:   Allergen Reactions    Dobutamine Other (See Comments)     Severe Left sided chest pain after dosage administered for Dobutamine Stress Test; itching    Lisinopril Swelling and Rash    Hydrocodone-acetaminophen Nausea Only    Augmentin [amoxicillin-pot clavulanate] Diarrhea    Zyvox [linezolid] Nausea And Vomiting       Current Inpatient Medications:   aluminum-magnesium hydroxide-simethicone  30 mL Oral QID (AC & HS)    aspirin  81 mg Oral Daily    calcitRIOL  0.25 mcg Oral Daily    carvediloL  3.125 mg Oral BID WM    cycloSPORINE modified (NEORAL)  75 mg Oral BID    NIFEdipine  60 mg Oral Daily    pantoprazole  40 mg Oral Daily    pregabalin  50 mg Oral BID    sertraline  25 mg Oral Daily    sucralfate  1 g Oral Q6H    tiZANidine  4 mg Oral BID       Medications Ordered Prior to Encounter[2]    Past Surgical History:   Procedure Laterality Date    bladder implant Right 06/11/2024    BLADDER SURGERY  2015 approx    mesh - Dr Everett then 2nd reconstructive sx Dr Onofre    BREAST BIOPSY Bilateral     NEGATIVE    BREAST BIOPSY Right 10/31/2022    benign    BREAST LUMPECTOMY Left 2021    BREAST SURGERY Left 09/28/2015    Bx - benign    BREAST SURGERY Right 12/2015    Bx benign    CARDIAC PACEMAKER REMOVAL Left 06/26/2014    Pacer defirillator removed. Put in 1993 aat time of heart transplant    CARPAL TUNNEL RELEASE Left 03/03/2015    Dr. Hall    COLONOSCOPY N/A 02/25/2021    Procedure: COLONOSCOPY;  Surgeon: Freida Ramirez MD;  Location: [facility];  Service: Endoscopy;  Laterality: N/A;    CYSTOCELE REPAIR      Twice with mesh removal    EPIDURAL STEROID  INJECTION INTO CERVICAL SPINE N/A 02/02/2023    Procedure: T11/T12 IL HELLEN;  Surgeon: Jassi Pierre MD;  [facility];  Service: Pain Management;  Laterality: N/A;    EPIDURAL STEROID INJECTION INTO CERVICAL SPINE N/A 9/16/2024    Procedure: T11/T12 IL HELLEN;  Surgeon: Jassi Pierre MD;  [facility];  Service: Pain Management;  Laterality: N/A;    HEART TRANSPLANT  1993    HERNIA REPAIR Right 1971 approx    Inguinal    HYSTERECTOMY  1983    vag hyst /LSO     IMPLANTATION OF PERMANENT SACRAL NERVE STIMULATOR N/A 06/11/2024    Procedure: INSERTION, NEUROSTIMULATOR, PERMANENT, SACRAL;  Surgeon: Sami Cochran MD;  Location: [facility] [place] [facility];  Service: Urology;  Laterality: N/A;    INCISION AND DRAINAGE OF ABSCESS Left 12/24/2021    Procedure: INCISION AND DRAINAGE, ABSCESS;  Surgeon: Joseph Longo MD;  Location: [facility] [place] [facility];  Service: General;  Laterality: Left;    INJECTION OF ANESTHETIC AGENT AROUND MEDIAL BRANCH NERVES INNERVATING LUMBAR FACET JOINT Right 10/19/2022    Procedure: Right L4/L5 and L5/S1 MBB;  Surgeon: Jassi Pierre MD;  Location: [facility];  Service: Pain Management;  Laterality: Right;    INJECTION OF ANESTHETIC AGENT AROUND MEDIAL BRANCH NERVES INNERVATING LUMBAR FACET JOINT Right 11/09/2022    Procedure: Right L4/L5 and L5/S1 MBB;  Surgeon: Jassi Pierre MD;  Location: [facility];  Service: Pain Management;  Laterality: Right;    INJECTION OF ANESTHETIC AGENT AROUND MEDIAL BRANCH NERVES INNERVATING LUMBAR FACET JOINT Left 2/6/2025    Procedure: Left L4-5 and L5-S1 MBB #1 with local;  Surgeon: Jasis Pierre MD;  Location: [facility];  Service: Pain Management;  Laterality: Left;    INJECTION OF ANESTHETIC AGENT AROUND MEDIAL BRANCH NERVES INNERVATING LUMBAR FACET JOINT Left 3/19/2025    Procedure: Left L4-5 and L5-S1 MBB #2 with local;  Surgeon: Jassi Pierre MD;  Location: [facility];  Service: Pain Management;  Laterality: Left;     INJECTION OF ANESTHETIC AGENT INTO SACROILIAC JOINT Right 08/22/2022    Procedure: Right SIJ Injection Right L5/S1 Facte Injection;  Surgeon: Jassi Pierre MD;  Location: [facility];  Service: Pain Management;  Laterality: Right;    INSERTION OF TUNNELED CENTRAL VENOUS CATHETER (CVC) WITH SUBCUTANEOUS PORT N/A 11/09/2021    Procedure: [id number];  Surgeon: Christoph Douglas MD;  Location: [facility] OR;  Service: General;  Laterality: N/A;    RADIOFREQUENCY ABLATION Right 12/5/2024    Procedure: Right L4-5 and L5-S1 RFA;  Surgeon: Jassi Pierre MD;  Location: [facility];  Service: Pain Management;  Laterality: Right;    RADIOFREQUENCY THERMOCOAGULATION Right 12/07/2022    Procedure: Right L4/L5 and L5/S1 Lumbar RFA;  Surgeon: Jassi Pierre MD;  [facility];  Service: Pain Management;  Laterality: Right;    REMOVAL OF ELECTRODE LEAD OF SACRAL NERVE STIMULATOR N/A 2/18/2025    Procedure: REMOVAL, ELECTRODE LEAD, SACRAL NERVE STIMULATOR;  Surgeon: Sami Cochran MD;  Location: [facility] OR;  Service: Urology;  Laterality: N/A;    REMOVAL OF VASCULAR ACCESS PORT      ROBOT-ASSISTED LAPAROSCOPIC ABDOMINAL SACROCOLPOPEXY N/A 08/10/2023    Procedure: ROBOTIC SACROCOLPOPEXY, ABDOMEN;  Surgeon: PRANAY Villalobos MD;  Location: [facility];  Service: OB/GYN;  Laterality: N/A;    ROBOT-ASSISTED LAPAROSCOPIC OOPHORECTOMY Right 08/10/2023    Procedure: ROBOTIC OOPHORECTOMY;  Surgeon: PRANAY Villalobos MD;  Location: [facility] OR;  Service: OB/GYN;  Laterality: Right;    SENTINEL LYMPH NODE BIOPSY Left 10/12/2021    Procedure: BIOPSY, LYMPH NODE, SENTINEL;  Surgeon: Christoph Douglas MD;  Location: [facility] OR;  Service: General;  Laterality: Left;    TOE SURGERY      XI ROBOTIC URETHROPEXY N/A 08/10/2023    Procedure: XI ROBOTIC URETHROPEXY;  Surgeon: PRANAY Villalobos MD;  Location: [facility] OR;  Service: OB/GYN;  Laterality: N/A;       Social History[3]    OBJECTIVE:     Vital Signs Range (Last  24H):  Temp:  [36.7 °C (98 °F)-37.2 °C (98.9 °F)]   Pulse:  [66-74]   Resp:  [16-19]   BP: ()/(54-79)   SpO2:  [94 %-98 %]       Significant Labs:  Lab Results   Component Value Date    WBC 2.57 (L) 04/26/2025    HGB 9.2 (L) 04/26/2025    HCT 28.4 (L) 04/26/2025     (L) 04/26/2025    CHOL 196 04/25/2025    TRIG 192 (H) 04/25/2025    HDL 59 04/25/2025    ALT 29 04/24/2025    AST 39 04/24/2025     04/26/2025    K 3.9 04/26/2025     04/26/2025    CREATININE 2.9 (H) 04/26/2025    BUN 31 (H) 04/26/2025    CO2 24 04/26/2025    TSH 12.101 (H) 04/25/2025    PSA <0.1 05/27/2008    INR 1.0 11/12/2024    HGBA1C 5.1 07/02/2024       Diagnostic Studies: No relevant studies.    EKG:   Results for orders placed or performed during the hospital encounter of 04/24/25   EKG 12-lead    Collection Time: 04/25/25  2:01 AM   Result Value Ref Range    QRS Duration 162 ms    OHS QTC Calculation 559 ms    Narrative    Test Reason : R07.9,    Vent. Rate : 100 BPM     Atrial Rate : 100 BPM     P-R Int : 156 ms          QRS Dur : 162 ms      QT Int : 434 ms       P-R-T Axes :  37 -63  18 degrees    QTcB Int : 559 ms    Normal sinus rhythm  Right bundle branch block  Left anterior fascicular block   Bifascicular block   T wave abnormality, consider lateral ischemia  Abnormal ECG  When compared with ECG of 24-Apr-2025 11:47,  Premature atrial complexes are no longer Present  Confirmed by Devyn Capps ([id number]) on 4/25/2025 9:21:20 AM    Referred By: KELLE LOGAN           Confirmed By: Devyn Capps       2D ECHO:  TTE:  Results for orders placed or performed during the hospital encounter of 04/24/25   Echo   Result Value Ref Range    LV Diastolic Volume 58 mL    Echo EF Estimated 63 %    LV Systolic Volume 21 mL    IVS 1.0 0.6 - 1.1 cm    LVIDd 3.7 3.5 - 6.0 cm    LVIDs 2.5 2.1 - 4.0 cm    LVOT diameter 2.1 cm    PW 1.0 0.6 - 1.1 cm    AV LVOT peak gradient 2 mmHg    LVOT mn grad 1 mmHg    LVOT peak ambika 0.6 m/s    LVOT peak  VTI 14.7 cm    RV- marie basal diam 3.4 cm    AV valve area 2.1 cm2    AV area by cont VTI 2.2 cm2    AV peak gradient 5 mmHg    AV mean gradient 2 mmHg    Ao peak jacky 1.1 m/s    Ao VTI 24.1 cm    MV Peak A Jacky 0.45 m/s    E wave deceleration time 145 ms    MV Peak E Jacky 0.68 m/s    E/A ratio 1.51     LV LATERAL E/E' RATIO 6.2     LV SEPTAL E/E' RATIO 9.7     TDI LATERAL 0.11 m/s    TDI SEPTAL 0.07 m/s    TV peak gradient 25 mmHg    TR Max Jacky 2.5 m/s    Ascending aorta 3.6 cm    STJ 3.2 cm    Sinus 2.99 cm    TAPSE 1.0 cm    BSA 1.7 m2    LVOT stroke volume 50.9 cm3    LV Systolic Volume Index 12.6 mL/m2    LV Diastolic Volume Index 34.73 mL/m2    LVOT area 3.5 cm2    FS 32.4 28 - 44 %    Left Ventricle Relative Wall Thickness 0.54 cm    LV mass 112.5 g    LV Mass Index 67.4 g/m2    E/E' ratio 8 m/s    RV/LV Ratio 0.92 cm    AV Velocity Ratio 0.55     AV index (prosthetic) 0.61     PRESLEY by Velocity Ratio 1.9 cm²    Mean e' 0.09 m/s    ZLVIDS -1.15     ZLVIDD -2.32     Narrative      Post cardiac transplantation study - OHTx 5/27/1993 at [facility]    Left Ventricle: The left ventricle is normal in size. Normal wall   thickness. There is concentric remodeling. There is normal systolic   function with a visually estimated ejection fraction of 55 - 60%. There is   indeterminate diastolic function.    Right Ventricle: The right ventricle is normal in size. Wall thickness   is normal. Systolic function is borderline low.    Tricuspid Valve: There is mild regurgitation.    Pulmonary Artery: No pulmonary hypertension.         AYAAN:  No results found. However, due to the size of the patient record, not all encounters were searched. Please check Results Review for a complete set of results.    ASSESSMENT/PLAN:           Pre-op Assessment    I have reviewed the Patient Summary Reports.     I have reviewed the Nursing Notes. I have reviewed the NPO Status.   I have reviewed the Medications.     Review of Systems  Anesthesia  Hx:  No problems with previous Anesthesia   History of prior surgery of interest to airway management or planning:          Denies Family Hx of Anesthesia complications.    Denies Personal Hx of Anesthesia complications.                    Hematology/Oncology:  Hematology Normal                                     EENT/Dental:  EENT/Dental Normal           Cardiovascular:     Hypertension   CAD      Angina CHF                                   Pulmonary:  Pulmonary Normal                       Renal/:  Chronic Renal Disease, CKD                Hepatic/GI:     GERD                Musculoskeletal:  Arthritis               Neurological:   CVA                                    Endocrine:   Hypothyroidism              Physical Exam  General: Well nourished, Cooperative, Alert and Oriented    Airway:  Mallampati: II / I  Mouth Opening: Normal  TM Distance: Normal  Tongue: Normal  Neck ROM: Normal ROM    Dental:  Partial Dentures    Chest/Lungs:  Normal Respiratory Rate    Heart:  Rate: Normal        Anesthesia Plan  Type of Anesthesia, risks & benefits discussed:    Anesthesia Type: Gen ETT, Gen Natural Airway  Intra-op Monitoring Plan: Standard ASA Monitors  Post Op Pain Control Plan: multimodal analgesia and IV/PO Opioids PRN  Induction:  IV  Airway Plan: Direct and Video, Post-Induction  Informed Consent: Informed consent signed with the Patient and all parties understand the risks and agree with anesthesia plan.  All questions answered.   ASA Score: 3 Emergent  Day of Surgery Review of History & Physical: H&P Update referred to the surgeon/provider.  Anesthesia Plan Notes: NPO confirmed  Class A emergency AYAAN to r/o dissection  Patient has been hemodynamically stable      Ready For Surgery From Anesthesia Perspective.     .           [1]   Patient Active Problem List  Diagnosis    Heart transplanted    Primary insomnia    Fibromyalgia    Gastroesophageal reflux disease without esophagitis    Essential hypertension     Aortic atherosclerosis    Secondary hyperparathyroidism, renal    Other osteoporosis without current pathological fracture    Immunocompromised patient    Ductal carcinoma in situ (DCIS) of left breast    Immunocompromised state due to drug therapy    Ulnar neuropathy of left upper extremity    Chronic diastolic (congestive) heart failure    Anemia associated with stage 5 chronic renal failure    Secondary and unspecified malignant neoplasm of axilla and upper limb lymph nodes    Chest pain    Acute cystitis without hematuria    Borderline abnormal TFTs    Other hyperlipidemia    DDD (degenerative disc disease), thoracolumbar    Ventral hernia without obstruction or gangrene    Abnormal serum thyroid stimulating hormone (TSH) level    FRANKY (stress urinary incontinence, female)    Prolapse of vaginal cuff after hysterectomy    Pulmonary heart disease    Urge incontinence of urine    Orthopedic device, implant, or graft complication    Bilateral hip pain    Chronic idiopathic constipation    Spinal stenosis of lumbosacral region    Patent foramen ovale    Chronic diastolic congestive heart failure    Primary osteoarthritis of right shoulder    Prolonged Q-T interval on ECG    CKD (chronic kidney disease) stage 5, GFR less than 15 ml/min    Chronic right-sided thoracic back pain    Hemiparesis affecting right side as late effect of cerebrovascular accident (CVA)    Encounter for palliative care    Angina of effort    Rapid palpitations    Spinal stenosis    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    Lymphedema of left arm    Chronic venous insufficiency    Lower extremity edema    Fall    Positive depression screening    Anemia    Hypokalemia    Hypomagnesemia    Chronic nausea   [2]   No current facility-administered medications on file prior to encounter.     Current Outpatient Medications on File Prior to Encounter   Medication Sig Dispense Refill    aspirin (ECOTRIN) 81 MG EC  tablet Take 1 tablet (81 mg total) by mouth once daily.      calcitRIOL (ROCALTROL) 0.25 MCG Cap Take 1 capsule (0.25 mcg total) by mouth once daily. 30 capsule 11    carvediloL (COREG) 3.125 MG tablet Take 1 tablet (3.125 mg total) by mouth 2 (two) times daily with meals. 180 tablet 0    cycloSPORINE modified, NEORAL, (NEORAL) 25 MG capsule Take 3 capsules (75 mg total) by mouth 2 (two) times daily. 540 capsule 11    furosemide (LASIX) 40 MG tablet Take 1 tablet (40 mg total) by mouth once daily. 30 tablet 11    mupirocin (BACTROBAN) 2 % ointment Apply topically 2 (two) times daily. 22 g 0    NIFEdipine (PROCARDIA-XL) 60 MG (OSM) 24 hr tablet Take 1 tablet (60 mg total) by mouth once daily. 30 tablet 11    nitroGLYCERIN (NITROSTAT) 0.4 MG SL tablet PLACE 1 TABLET UNDER THE TONGUE EVERY 5 MINS AS NEEDED FOR CHEST PAIN FOR UP TO 3 DOSES.IF CONTINUED HEART PAIN/PRESSURE AFTER 100 tablet 0    ondansetron (ZOFRAN) 4 MG tablet Take 1 tablet (4 mg total) by mouth every 12 (twelve) hours as needed for Nausea. 16 tablet 0    oxyCODONE-acetaminophen (PERCOCET) 5-325 mg per tablet Take 1 tablet by mouth every 4 (four) hours as needed for Pain. 25 tablet 0    pantoprazole (PROTONIX) 20 MG tablet TAKE 1 TABLET EVERY DAY 90 tablet 1    patiromer calcium sorbitex (VELTASSA) 8.4 gram PwPk Take 1 packet (8.4 g total) by mouth once daily. 90 packet 0    polyethylene glycol (GLYCOLAX) 17 gram PwPk Take 17 g by mouth once daily.      pregabalin (LYRICA) 50 MG capsule TAKE 1 CAPSULE TWICE DAILY 180 capsule 0    sertraline (ZOLOFT) 25 MG tablet Take 1 tablet (25 mg total) by mouth once daily. 30 tablet 11    sodium bicarbonate 650 MG tablet Take 1 tablet (650 mg total) by mouth 2 (two) times daily. 60 tablet 1    temazepam (RESTORIL) 30 mg capsule Take 1 capsule (30 mg total) by mouth nightly as needed for Insomnia. 30 capsule 1    tiZANidine (ZANAFLEX) 2 MG tablet Take 2 tablets (4 mg total) by mouth 2 (two) times a day. 120 tablet 5     vitamin E 400 UNIT capsule Take 400 Units by mouth once daily.     [3]   Social History  Socioeconomic History    Marital status: Single    Number of children: 2    Highest education level: 11th grade   Occupational History    Occupation: Retired   Tobacco Use    Smoking status: Never     Passive exposure: Never    Smokeless tobacco: Never   Substance and Sexual Activity    Alcohol use: Never     Alcohol/week: 0.0 standard drinks of alcohol    Drug use: No    Sexual activity: Not Currently     Partners: Male     Birth control/protection: See Surgical Hx   Other Topics Concern    Are you pregnant or think you may be? No    Breast-feeding No   Social History Narrative    Single. 2 children , 1  at 30 yoa   strep throat -  pneumonia and renal complications after not completing course of AB. Other child lives in [place], [place]. Has a cousin locally that could help in an emergency. 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 Caffeine intake =- 1 cola a day. No coffee, + occasional tea, avoids caffeine especially at night. Still drives. She does not have a Living Will or Advanced directive.      Social Drivers of Health     Financial Resource Strain: Low Risk  (2025)    Overall Financial Resource Strain (CARDIA)     Difficulty of Paying Living Expenses: Not hard at all   Food Insecurity: No Food Insecurity (2025)    Hunger Vital Sign     Worried About Running Out of Food in the Last Year: Never true     Ran Out of Food in the Last Year: Never true   Recent Concern: Food Insecurity - Food Insecurity Present (2024)    Hunger Vital Sign     Worried About Running Out of Food in the Last Year: Never true     Ran Out of Food in the Last Year: Sometimes true   Transportation Needs: No Transportation Needs (2025)    PRAPARE - Transportation     Lack of Transportation (Medical): No     Lack of Transportation (Non-Medical): No   Physical Activity: Inactive (2025)     Exercise Vital Sign     Days of Exercise per Week: 0 days     Minutes of Exercise per Session: 0 min   Stress: Stress Concern Present (1/7/2025)    [employer] [place] [employer] - Occupational Stress Questionnaire     Feeling of Stress : To some extent   Housing Stability: Unknown (1/7/2025)    Housing Stability Vital Sign     Unable to Pay for Housing in the Last Year: No     Homeless in the Last Year: No

## 2025-04-26 NOTE — PLAN OF CARE
Problem: Adult Inpatient Plan of Care  Goal: Plan of Care Review  Outcome: Progressing  Goal: Patient-Specific Goal (Individualized)  Outcome: Progressing     Problem: Wound  Goal: Optimal Coping  Outcome: Progressing

## 2025-04-27 ENCOUNTER — ANESTHESIA (OUTPATIENT)
Dept: INTENSIVE CARE | Facility: HOSPITAL | Age: 70
End: 2025-04-27
Payer: MEDICARE

## 2025-04-27 ENCOUNTER — ANESTHESIA EVENT (OUTPATIENT)
Dept: INTENSIVE CARE | Facility: HOSPITAL | Age: 70
End: 2025-04-27
Payer: MEDICARE

## 2025-04-27 PROBLEM — I46.9 CARDIAC ARREST: Status: ACTIVE | Noted: 2025-04-27

## 2025-04-27 LAB
ABSOLUTE EOSINOPHIL (OHS): 0.06 K/UL
ABSOLUTE MONOCYTE (OHS): 0.08 K/UL (ref 0.3–1)
ABSOLUTE NEUTROPHIL COUNT (OHS): 0.75 K/UL (ref 1.8–7.7)
ALBUMIN SERPL BCP-MCNC: 2.6 G/DL (ref 3.5–5.2)
ALP SERPL-CCNC: 108 UNIT/L (ref 40–150)
ALT SERPL W/O P-5'-P-CCNC: 377 UNIT/L (ref 10–44)
ANION GAP (OHS): 14 MMOL/L (ref 8–16)
ANION GAP (OHS): 14 MMOL/L (ref 8–16)
APTT PPP: 22.5 SECONDS (ref 21–32)
AST SERPL-CCNC: 631 UNIT/L (ref 11–45)
BACTERIA #/AREA URNS AUTO: ABNORMAL /HPF
BASOPHILS # BLD AUTO: 0.01 K/UL
BASOPHILS NFR BLD AUTO: 0.4 %
BILIRUB SERPL-MCNC: 0.5 MG/DL (ref 0.1–1)
BILIRUB UR QL STRIP.AUTO: NEGATIVE
BUN SERPL-MCNC: 33 MG/DL (ref 8–23)
BUN SERPL-MCNC: 33 MG/DL (ref 8–23)
CALCIUM SERPL-MCNC: 7.5 MG/DL (ref 8.7–10.5)
CALCIUM SERPL-MCNC: 8.1 MG/DL (ref 8.7–10.5)
CHLORIDE SERPL-SCNC: 106 MMOL/L (ref 95–110)
CHLORIDE SERPL-SCNC: 109 MMOL/L (ref 95–110)
CLARITY UR: ABNORMAL
CO2 SERPL-SCNC: 17 MMOL/L (ref 23–29)
CO2 SERPL-SCNC: 21 MMOL/L (ref 23–29)
COLOR UR AUTO: YELLOW
CREAT SERPL-MCNC: 2.8 MG/DL (ref 0.5–1.4)
CREAT SERPL-MCNC: 3.1 MG/DL (ref 0.5–1.4)
ERYTHROCYTE [DISTWIDTH] IN BLOOD BY AUTOMATED COUNT: 15.8 % (ref 11.5–14.5)
GFR SERPLBLD CREATININE-BSD FMLA CKD-EPI: 16 ML/MIN/1.73/M2
GFR SERPLBLD CREATININE-BSD FMLA CKD-EPI: 18 ML/MIN/1.73/M2
GLUCOSE SERPL-MCNC: 162 MG/DL (ref 70–110)
GLUCOSE SERPL-MCNC: 97 MG/DL (ref 70–110)
GLUCOSE UR QL STRIP: ABNORMAL
HCT VFR BLD AUTO: 30 % (ref 37–48.5)
HGB BLD-MCNC: 9.6 GM/DL (ref 12–16)
HGB UR QL STRIP: ABNORMAL
HOLD SPECIMEN: NORMAL
HYALINE CASTS UR QL AUTO: 57 /LPF (ref 0–1)
IMM GRANULOCYTES # BLD AUTO: 0.03 K/UL (ref 0–0.04)
IMM GRANULOCYTES NFR BLD AUTO: 1.3 % (ref 0–0.5)
INR PPP: 1 (ref 0.8–1.2)
KETONES UR QL STRIP: NEGATIVE
LACTATE SERPL-SCNC: 0.9 MMOL/L (ref 0.5–2.2)
LACTATE SERPL-SCNC: 2.3 MMOL/L (ref 0.5–2.2)
LACTATE SERPL-SCNC: 4.6 MMOL/L (ref 0.5–2.2)
LEUKOCYTE ESTERASE UR QL STRIP: ABNORMAL
LYMPHOCYTES # BLD AUTO: 1.39 K/UL (ref 1–4.8)
MAGNESIUM SERPL-MCNC: 2 MG/DL (ref 1.6–2.6)
MCH RBC QN AUTO: 29.1 PG (ref 27–31)
MCHC RBC AUTO-ENTMCNC: 32 G/DL (ref 32–36)
MCV RBC AUTO: 91 FL (ref 82–98)
MICROSCOPIC COMMENT: ABNORMAL
NITRITE UR QL STRIP: NEGATIVE
NUCLEATED RBC (/100WBC) (OHS): 0 /100 WBC
PH UR STRIP: 7 [PH]
PLATELET # BLD AUTO: 136 K/UL (ref 150–450)
PMV BLD AUTO: 10.3 FL (ref 9.2–12.9)
POCT GLUCOSE: 209 MG/DL (ref 70–110)
POCT GLUCOSE: 95 MG/DL (ref 70–110)
POTASSIUM SERPL-SCNC: 3.6 MMOL/L (ref 3.5–5.1)
POTASSIUM SERPL-SCNC: 4.7 MMOL/L (ref 3.5–5.1)
PROT SERPL-MCNC: 6.7 GM/DL (ref 6–8.4)
PROT UR QL STRIP: ABNORMAL
PROTHROMBIN TIME: 10.9 SECONDS (ref 9–12.5)
RBC # BLD AUTO: 3.3 M/UL (ref 4–5.4)
RBC #/AREA URNS AUTO: 49 /HPF (ref 0–4)
RELATIVE EOSINOPHIL (OHS): 2.6 %
RELATIVE LYMPHOCYTE (OHS): 59.9 % (ref 18–48)
RELATIVE MONOCYTE (OHS): 3.4 % (ref 4–15)
RELATIVE NEUTROPHIL (OHS): 32.4 % (ref 38–73)
SODIUM SERPL-SCNC: 140 MMOL/L (ref 136–145)
SODIUM SERPL-SCNC: 141 MMOL/L (ref 136–145)
SP GR UR STRIP: 1.01
SQUAMOUS #/AREA URNS AUTO: 7 /HPF
UROBILINOGEN UR STRIP-ACNC: NEGATIVE EU/DL
WBC # BLD AUTO: 2.32 K/UL (ref 3.9–12.7)
WBC #/AREA URNS AUTO: >100 /HPF (ref 0–5)
WBC CLUMPS UR QL AUTO: ABNORMAL

## 2025-04-27 PROCEDURE — 5A1935Z RESPIRATORY VENTILATION, LESS THAN 24 CONSECUTIVE HOURS: ICD-10-PCS | Performed by: INTERNAL MEDICINE

## 2025-04-27 PROCEDURE — 94002 VENT MGMT INPAT INIT DAY: CPT | Mod: HCNC

## 2025-04-27 PROCEDURE — 99291 CRITICAL CARE FIRST HOUR: CPT | Mod: HCNC,,, | Performed by: NURSE PRACTITIONER

## 2025-04-27 PROCEDURE — 25000003 PHARM REV CODE 250: Mod: HCNC | Performed by: STUDENT IN AN ORGANIZED HEALTH CARE EDUCATION/TRAINING PROGRAM

## 2025-04-27 PROCEDURE — 83520 IMMUNOASSAY QUANT NOS NONAB: CPT | Mod: HCNC

## 2025-04-27 PROCEDURE — 25000242 PHARM REV CODE 250 ALT 637 W/ HCPCS: Mod: HCNC

## 2025-04-27 PROCEDURE — 85025 COMPLETE CBC W/AUTO DIFF WBC: CPT | Mod: HCNC | Performed by: STUDENT IN AN ORGANIZED HEALTH CARE EDUCATION/TRAINING PROGRAM

## 2025-04-27 PROCEDURE — 87088 URINE BACTERIA CULTURE: CPT | Mod: HCNC | Performed by: INTERNAL MEDICINE

## 2025-04-27 PROCEDURE — 81003 URINALYSIS AUTO W/O SCOPE: CPT | Mod: HCNC | Performed by: INTERNAL MEDICINE

## 2025-04-27 PROCEDURE — 83605 ASSAY OF LACTIC ACID: CPT | Mod: HCNC | Performed by: PHYSICIAN ASSISTANT

## 2025-04-27 PROCEDURE — 83735 ASSAY OF MAGNESIUM: CPT | Mod: HCNC | Performed by: STUDENT IN AN ORGANIZED HEALTH CARE EDUCATION/TRAINING PROGRAM

## 2025-04-27 PROCEDURE — 63600175 PHARM REV CODE 636 W HCPCS: Mod: HCNC

## 2025-04-27 PROCEDURE — 95700 EEG CONT REC W/VID EEG TECH: CPT | Mod: HCNC

## 2025-04-27 PROCEDURE — 85610 PROTHROMBIN TIME: CPT | Mod: HCNC | Performed by: INTERNAL MEDICINE

## 2025-04-27 PROCEDURE — 94640 AIRWAY INHALATION TREATMENT: CPT | Mod: HCNC

## 2025-04-27 PROCEDURE — 80053 COMPREHEN METABOLIC PANEL: CPT | Mod: HCNC | Performed by: PHYSICIAN ASSISTANT

## 2025-04-27 PROCEDURE — 95714 VEEG EA 12-26 HR UNMNTR: CPT | Mod: HCNC

## 2025-04-27 PROCEDURE — 94761 N-INVAS EAR/PLS OXIMETRY MLT: CPT | Mod: HCNC

## 2025-04-27 PROCEDURE — 0BH17EZ INSERTION OF ENDOTRACHEAL AIRWAY INTO TRACHEA, VIA NATURAL OR ARTIFICIAL OPENING: ICD-10-PCS | Performed by: INTERNAL MEDICINE

## 2025-04-27 PROCEDURE — 63600175 PHARM REV CODE 636 W HCPCS: Mod: HCNC | Performed by: STUDENT IN AN ORGANIZED HEALTH CARE EDUCATION/TRAINING PROGRAM

## 2025-04-27 PROCEDURE — 85730 THROMBOPLASTIN TIME PARTIAL: CPT | Mod: HCNC | Performed by: INTERNAL MEDICINE

## 2025-04-27 PROCEDURE — 99900026 HC AIRWAY MAINTENANCE (STAT): Mod: HCNC

## 2025-04-27 PROCEDURE — 99900035 HC TECH TIME PER 15 MIN (STAT): Mod: HCNC

## 2025-04-27 PROCEDURE — 5A12012 PERFORMANCE OF CARDIAC OUTPUT, SINGLE, MANUAL: ICD-10-PCS | Performed by: INTERNAL MEDICINE

## 2025-04-27 PROCEDURE — 25000003 PHARM REV CODE 250: Mod: HCNC

## 2025-04-27 PROCEDURE — 83605 ASSAY OF LACTIC ACID: CPT | Mod: HCNC | Performed by: INTERNAL MEDICINE

## 2025-04-27 PROCEDURE — 93010 ELECTROCARDIOGRAM REPORT: CPT | Mod: HCNC,,, | Performed by: INTERNAL MEDICINE

## 2025-04-27 PROCEDURE — 83605 ASSAY OF LACTIC ACID: CPT | Mod: HCNC

## 2025-04-27 PROCEDURE — 92950 HEART/LUNG RESUSCITATION CPR: CPT | Mod: HCNC

## 2025-04-27 PROCEDURE — 27100171 HC OXYGEN HIGH FLOW UP TO 24 HOURS: Mod: HCNC

## 2025-04-27 PROCEDURE — 20000000 HC ICU ROOM: Mod: HCNC

## 2025-04-27 PROCEDURE — 31500 INSERT EMERGENCY AIRWAY: CPT | Mod: HCNC

## 2025-04-27 PROCEDURE — 87040 BLOOD CULTURE FOR BACTERIA: CPT | Mod: HCNC | Performed by: INTERNAL MEDICINE

## 2025-04-27 PROCEDURE — 95720 EEG PHY/QHP EA INCR W/VEEG: CPT | Mod: HCNC,,, | Performed by: PSYCHIATRY & NEUROLOGY

## 2025-04-27 PROCEDURE — 93005 ELECTROCARDIOGRAM TRACING: CPT | Mod: HCNC

## 2025-04-27 PROCEDURE — 94799 UNLISTED PULMONARY SVC/PX: CPT | Mod: HCNC

## 2025-04-27 RX ORDER — FENTANYL CITRATE 50 UG/ML
25 INJECTION, SOLUTION INTRAMUSCULAR; INTRAVENOUS EVERY 4 HOURS PRN
Refills: 0 | Status: DISCONTINUED | OUTPATIENT
Start: 2025-04-27 | End: 2025-04-29

## 2025-04-27 RX ORDER — PROPOFOL 10 MG/ML
0-50 INJECTION, EMULSION INTRAVENOUS CONTINUOUS
Status: CANCELLED | OUTPATIENT
Start: 2025-04-27

## 2025-04-27 RX ORDER — NICARDIPINE HYDROCHLORIDE 0.2 MG/ML
0-15 INJECTION INTRAVENOUS CONTINUOUS
Status: DISCONTINUED | OUTPATIENT
Start: 2025-04-27 | End: 2025-05-01

## 2025-04-27 RX ORDER — CYCLOSPORINE 100 MG/ML
75 SOLUTION ORAL 2 TIMES DAILY
Status: DISCONTINUED | OUTPATIENT
Start: 2025-04-27 | End: 2025-04-27

## 2025-04-27 RX ORDER — PROPOFOL 10 MG/ML
INJECTION, EMULSION INTRAVENOUS
Status: DISPENSED
Start: 2025-04-27 | End: 2025-04-27

## 2025-04-27 RX ORDER — PROPOFOL 10 MG/ML
INJECTION, EMULSION INTRAVENOUS
Status: DISCONTINUED
Start: 2025-04-27 | End: 2025-04-27 | Stop reason: WASHOUT

## 2025-04-27 RX ORDER — SUCCINYLCHOLINE CHLORIDE 20 MG/ML
INJECTION INTRAMUSCULAR; INTRAVENOUS
Status: DISPENSED
Start: 2025-04-27 | End: 2025-04-27

## 2025-04-27 RX ORDER — SUCCINYLCHOLINE CHLORIDE 20 MG/ML
INJECTION INTRAMUSCULAR; INTRAVENOUS
Status: DISCONTINUED
Start: 2025-04-27 | End: 2025-04-27 | Stop reason: WASHOUT

## 2025-04-27 RX ORDER — CYCLOSPORINE 100 MG/ML
75 SOLUTION ORAL 2 TIMES DAILY
Status: DISCONTINUED | OUTPATIENT
Start: 2025-04-27 | End: 2025-04-29

## 2025-04-27 RX ORDER — ROCURONIUM BROMIDE 10 MG/ML
INJECTION, SOLUTION INTRAVENOUS
Status: DISPENSED
Start: 2025-04-27 | End: 2025-04-27

## 2025-04-27 RX ORDER — NICARDIPINE HYDROCHLORIDE 0.2 MG/ML
0-15 INJECTION INTRAVENOUS CONTINUOUS
Status: DISCONTINUED | OUTPATIENT
Start: 2025-04-27 | End: 2025-04-27

## 2025-04-27 RX ORDER — FENTANYL CITRATE 50 UG/ML
25 INJECTION, SOLUTION INTRAMUSCULAR; INTRAVENOUS EVERY 12 HOURS PRN
Refills: 0 | Status: DISCONTINUED | OUTPATIENT
Start: 2025-04-27 | End: 2025-04-27

## 2025-04-27 RX ORDER — PROPOFOL 10 MG/ML
0-50 INJECTION, EMULSION INTRAVENOUS CONTINUOUS
Status: DISCONTINUED | OUTPATIENT
Start: 2025-04-27 | End: 2025-04-27

## 2025-04-27 RX ORDER — DEXMEDETOMIDINE HYDROCHLORIDE 4 UG/ML
0-1.4 INJECTION, SOLUTION INTRAVENOUS CONTINUOUS
Status: DISCONTINUED | OUTPATIENT
Start: 2025-04-27 | End: 2025-05-01

## 2025-04-27 RX ORDER — FENTANYL CITRATE-0.9 % NACL/PF 10 MCG/ML
0-250 PLASTIC BAG, INJECTION (ML) INTRAVENOUS CONTINUOUS
Status: DISCONTINUED | OUTPATIENT
Start: 2025-04-27 | End: 2025-04-27

## 2025-04-27 RX ORDER — FENTANYL CITRATE 50 UG/ML
25 INJECTION, SOLUTION INTRAMUSCULAR; INTRAVENOUS
Refills: 0 | Status: DISCONTINUED | OUTPATIENT
Start: 2025-04-27 | End: 2025-04-27

## 2025-04-27 RX ORDER — CHLORHEXIDINE GLUCONATE ORAL RINSE 1.2 MG/ML
15 SOLUTION DENTAL 2 TIMES DAILY
Status: DISCONTINUED | OUTPATIENT
Start: 2025-04-27 | End: 2025-04-27

## 2025-04-27 RX ADMIN — CALCITRIOL CAPSULES 0.25 MCG 0.25 MCG: 0.25 CAPSULE ORAL at 10:04

## 2025-04-27 RX ADMIN — SUCRALFATE 1 G: 1 SUSPENSION ORAL at 06:04

## 2025-04-27 RX ADMIN — ACETAMINOPHEN 650 MG: 325 TABLET ORAL at 04:04

## 2025-04-27 RX ADMIN — PROPOFOL 20 MCG/KG/MIN: 10 INJECTION, EMULSION INTRAVENOUS at 08:04

## 2025-04-27 RX ADMIN — CARVEDILOL 3.12 MG: 3.12 TABLET, FILM COATED ORAL at 01:04

## 2025-04-27 RX ADMIN — FENTANYL CITRATE 25 MCG: 50 INJECTION INTRAMUSCULAR; INTRAVENOUS at 11:04

## 2025-04-27 RX ADMIN — ALUMINUM HYDROXIDE, MAGNESIUM HYDROXIDE, AND SIMETHICONE 30 ML: 200; 200; 20 SUSPENSION ORAL at 12:04

## 2025-04-27 RX ADMIN — FENTANYL CITRATE 25 MCG: 50 INJECTION INTRAMUSCULAR; INTRAVENOUS at 05:04

## 2025-04-27 RX ADMIN — CYCLOSPORINE 75 MG: 100 SOLUTION ORAL at 09:04

## 2025-04-27 RX ADMIN — PREGABALIN 50 MG: 50 CAPSULE ORAL at 09:04

## 2025-04-27 RX ADMIN — PREGABALIN 50 MG: 50 CAPSULE ORAL at 10:04

## 2025-04-27 RX ADMIN — ALUMINUM HYDROXIDE, MAGNESIUM HYDROXIDE, AND SIMETHICONE 30 ML: 200; 200; 20 SUSPENSION ORAL at 06:04

## 2025-04-27 RX ADMIN — SUCRALFATE 1 G: 1 SUSPENSION ORAL at 05:04

## 2025-04-27 RX ADMIN — SUCRALFATE 1 G: 1 SUSPENSION ORAL at 12:04

## 2025-04-27 RX ADMIN — ALUMINUM HYDROXIDE, MAGNESIUM HYDROXIDE, AND SIMETHICONE 30 ML: 200; 200; 20 SUSPENSION ORAL at 05:04

## 2025-04-27 RX ADMIN — TIZANIDINE 4 MG: 4 TABLET ORAL at 10:04

## 2025-04-27 RX ADMIN — SERTRALINE HYDROCHLORIDE 25 MG: 25 TABLET ORAL at 10:04

## 2025-04-27 RX ADMIN — RACEPINEPHRINE HYDROCHLORIDE 0.5 ML: 11.25 SOLUTION RESPIRATORY (INHALATION) at 02:04

## 2025-04-27 RX ADMIN — OXYCODONE 5 MG: 5 TABLET ORAL at 09:04

## 2025-04-27 RX ADMIN — CARVEDILOL 3.12 MG: 3.12 TABLET, FILM COATED ORAL at 05:04

## 2025-04-27 RX ADMIN — CHLORHEXIDINE GLUCONATE 0.12% ORAL RINSE 15 ML: 1.2 LIQUID ORAL at 09:04

## 2025-04-27 RX ADMIN — ALUMINUM HYDROXIDE, MAGNESIUM HYDROXIDE, AND SIMETHICONE 30 ML: 200; 200; 20 SUSPENSION ORAL at 10:04

## 2025-04-27 RX ADMIN — DEXMEDETOMIDINE HYDROCHLORIDE 0.2 MCG/KG/HR: 4 INJECTION INTRAVENOUS at 01:04

## 2025-04-27 RX ADMIN — TIZANIDINE 4 MG: 4 TABLET ORAL at 09:04

## 2025-04-27 RX ADMIN — CYCLOSPORINE 75 MG: 25 CAPSULE, LIQUID FILLED ORAL at 10:04

## 2025-04-27 NOTE — CODE DOCUMENTATION
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  MRN: [id number]  Attending Physician: Parker Le, *  Primary Service: [facility]ANSPL[facility] TEAM 2  Primary Service Notified?: Yes    CODE Category (Acute Respiratory Compromise or Cardiopulmonary Arrest): Cardiopulmonary arrest  Time of need for chest compressions or airway support: 706  Time CPR initiated: 706  First documented rhythm: PEA  Time to first epinephrine given: 709  Time to first defibrillation: N/A  Time to intubation: 719  ROSC? (if yes provide time): 711  Disposition (transferred to ICU, , etc): Transferred to CICU    CODE Team Members:  Anesthesia Resident: Harley Garrison MD  CODE : Arlette Dawn NP  CODE Team Recorder: Destiny Martinez RN  RR Nurse #1: Bruno Ivory RN  RR Nurse #2: Lidia Li RN    SUBJECTIVE:     Events preceding cardiopulmonary arrest: Patient went to the restroom. After returning to the bed, felt faint and bradycardia noted.     Objective:     Physical Exam:  Last Vitals:  Vitals:    25 0900   BP: 129/79   Pulse: 61   Resp: 20   Temp: 97 °F (36.1 °C)     GA: Comatose, unresponsive.  HEENT: No scleral icterus or JVD.   Pulmonary: Apneic.    Cardiac: Pulseless.    Abdominal: No visible abdominal lesions.   Neuro:  --GCS: E1 V1 M1  --Mental Status:  obtunded     IV Access PTA::           Peripheral IV - Single Lumen 25 1043 20 G 1 3/4 in Anterior;Right Upper Arm (Active)   Site Assessment Clean;Dry;Intact 25 0400   Line Securement Device Secured with sutureless device 25   Extremity Assessment Distal to IV No abnormal discoloration 25   Line Status Saline locked 250   Dressing Status Clean;Dry;Intact 25 0400  "  Dressing Intervention Integrity maintained 04/27/25 0400   Dressing Change Due 04/29/25 04/26/25 2000   Site Change Due 04/29/25 04/26/25 2000   Reason Not Rotated Not due 04/26/25 2000            Peripheral IV - Single Lumen 04/27/25 0854 20 G Anterior;Left Forearm (Active)       Labs:  ABG: No results for input(s): "PH", "PO2", "PCO2", "HCO3", "POCSATURATED", "BE" in the last 24 hours.  BMP:  Recent Labs   Lab 04/27/25  0747      K 3.6      CO2 17*   BUN 33*   CREATININE 3.1*   MG 2.0     LFT:   Lab Results   Component Value Date     (H) 04/27/2025     (H) 04/27/2025    ALKPHOS 108 04/27/2025    BILITOT 0.5 04/27/2025    ALBUMIN 2.6 (L) 04/27/2025    PROT 7.9 02/24/2025     CBC:   Lab Results   Component Value Date    WBC 2.32 (L) 04/27/2025    HGB 9.6 (L) 04/27/2025    HCT 30.0 (L) 04/27/2025    MCV 91 04/27/2025     (L) 04/27/2025       CODE Timeline: Time/Event     Code team arrived shortly after Code Blue was called. PEA on telemetry. Chest compressions started and BMV ventilation ongoing. Pads were placed and ACLS protocol followed. 1mg epinephrine was given with ROSC noted shortly after. The patient was tachycardiac with a pulse and spontaneous respirations, but remained obtunded. She then started vomited several time and was suctioned. Once RSI meds were obtained, she was intubated by Dr. Garrison.     She was hemodynamically stable and transferred to the cardiac ICU. Primary team notified family of the events of this morning.     Plan discussed with Dr. Lucio.   Critical Care Time (uninterrupted) 35 mins    Arlette Dawn NP  Critical Care Medicine       "

## 2025-04-27 NOTE — SUBJECTIVE & OBJECTIVE
Interval History: This AM around 700 patient had PEA arrest reportedly shortly after using the restroom and returning to bed. ROSC achieved after round of CPR w/ 1 dose of epi. Post cardiac arrest she was intubated for airway protection. Was very slow to wake up (unresponsive for 2-3 hours post sedation stopping) but around 1130 woke up and started following all commands.    Continuous Infusions:   dexmedeTOMIDine (Precedex) infusion (titrating)  0-1.4 mcg/kg/hr Intravenous Continuous 3.33 mL/hr at 04/27/25 1338 0.2 mcg/kg/hr at 04/27/25 1338    nicardipine  0-15 mg/hr Intravenous Continuous        propofoL  0-50 mcg/kg/min Intravenous Continuous   Stopped at 04/27/25 0855     Scheduled Meds:   aluminum-magnesium hydroxide-simethicone  30 mL Oral QID (AC & HS)    aspirin  81 mg Oral Daily    calcitRIOL  0.25 mcg Oral Daily    carvediloL  3.125 mg Oral BID WM    chlorhexidine  15 mL Mouth/Throat BID    cycloSPORINE modified (NEORAL)  75 mg Oral BID    NIFEdipine  60 mg Oral Daily    pantoprazole  40 mg Oral Daily    pregabalin  50 mg Oral BID    propofoL        propofoL        rocuronium        sertraline  25 mg Oral Daily    succinylcholine        sucralfate  1 g Oral Q6H    tiZANidine  4 mg Oral BID     PRN Meds:  Current Facility-Administered Medications:     acetaminophen, 650 mg, Oral, Q4H PRN    aluminum-magnesium hydroxide-simethicone, 30 mL, Oral, Q6H PRN    nitroGLYCERIN, 0.4 mg, Sublingual, Q5 Min PRN    nitroGLYCERIN, 0.4 mg, Sublingual, Q5 Min PRN    ondansetron, 8 mg, Oral, Q8H PRN    oxyCODONE, 5 mg, Oral, Q4H PRN    polyethylene glycol, 17 g, Oral, BID PRN    propofoL, , ,     propofoL, , ,     racepinephrine, 0.5 mL, Nebulization, Q4H PRN    rocuronium, , ,     sodium chloride 0.9%, 10 mL, Intravenous, PRN    succinylcholine, , ,     zolpidem, 5 mg, Oral, Nightly PRN    Review of patient's allergies indicates:   Allergen Reactions    Dobutamine Other (See Comments)     Severe Left sided chest pain  after dosage administered for Dobutamine Stress Test; itching    Lisinopril Swelling and Rash    Hydrocodone-acetaminophen Nausea Only    Augmentin [amoxicillin-pot clavulanate] Diarrhea    Zyvox [linezolid] Nausea And Vomiting     Objective:     Vital Signs (Most Recent):  Temp: 96.3 °F (35.7 °C) (04/27/25 1215)  Pulse: 69 (04/27/25 1215)  Resp: (!) 25 (04/27/25 1215)  BP: (!) 141/93 (04/27/25 1215)  SpO2: 100 % (04/27/25 1215) Vital Signs (24h Range):  Temp:  [96.1 °F (35.6 °C)-99.2 °F (37.3 °C)] 96.3 °F (35.7 °C)  Pulse:  [] 69  Resp:  [16-46] 25  SpO2:  [94 %-100 %] 100 %  BP: ()/() 141/93     Patient Vitals for the past 72 hrs (Last 3 readings):   Weight   04/27/25 0745 64.3 kg (141 lb 12.1 oz)   04/26/25 2100 66.5 kg (146 lb 9.7 oz)     Body mass index is 25.93 kg/m².      Intake/Output Summary (Last 24 hours) at 4/27/2025 1357  Last data filed at 4/27/2025 1301  Gross per 24 hour   Intake 232.65 ml   Output 1449 ml   Net -1216.35 ml          Physical Exam  Constitutional:       General: She is not in acute distress.     Appearance: Normal appearance. She is not ill-appearing.   HENT:      Head: Normocephalic and atraumatic.   Cardiovascular:      Rate and Rhythm: Normal rate and regular rhythm.      Pulses: Normal pulses.      Heart sounds: Normal heart sounds.   Pulmonary:      Effort: Pulmonary effort is normal.      Breath sounds: Normal breath sounds.   Chest:      Comments: L ribcage tender to touch  Abdominal:      General: Abdomen is flat. There is no distension.      Palpations: Abdomen is soft.      Tenderness: There is no abdominal tenderness.   Musculoskeletal:      Right lower leg: No edema.      Left lower leg: No edema.   Skin:     General: Skin is warm and dry.   Neurological:      General: No focal deficit present.      Mental Status: She is alert and oriented to person, place, and time.   Psychiatric:         Mood and Affect: Mood normal.         Behavior: Behavior normal.  "           Significant Labs:  CBC:  Recent Labs   Lab 04/25/25  0319 04/26/25  0533 04/27/25  0747   WBC 3.70* 2.57* 2.32*   RBC 3.43* 3.14* 3.30*   HGB 10.1* 9.2* 9.6*   HCT 30.1* 28.4* 30.0*    148* 136*   MCV 88 90 91   MCH 29.4 29.3 29.1   MCHC 33.6 32.4 32.0     BNP:  Recent Labs   Lab 04/24/25  1343   *     CMP:  Recent Labs   Lab 04/24/25  1343 04/25/25  0319 04/26/25  0533 04/27/25  0747   CALCIUM 8.7 8.7 8.5* 7.5*   ALBUMIN 3.6  --   --  2.6*    144 141 140   K 3.3* 4.2 3.9 3.6   CO2 20* 18* 24 17*    111* 106 109   BUN 36* 33* 31* 33*   CREATININE 3.4* 3.0* 2.9* 3.1*   ALKPHOS 136  --   --  108   ALT 29  --   --  377*   AST 39  --   --  631*   BILITOT 0.5  --   --  0.5      Coagulation:   Recent Labs   Lab 04/27/25  0747   INR 1.0   APTT 22.5     LDH:  No results for input(s): "LDH" in the last 72 hours.  Microbiology:  Microbiology Results (last 7 days)       Procedure Component Value Units Date/Time    Blood culture [[id number]] Collected: 04/27/25 0942    Order Status: Resulted Specimen: Blood from Peripheral, Antecubital, Left Updated: 04/27/25 0945    Blood culture [[id number]] Collected: 04/27/25 0923    Order Status: Resulted Specimen: Blood from Peripheral, Antecubital, Right Updated: 04/27/25 0933    Blood culture [[id number]]  (Normal) Collected: 04/25/25 0319    Order Status: Completed Specimen: Blood Updated: 04/27/25 0600     Blood Culture No Growth After 36 Hours    Blood culture [[id number]]  (Normal) Collected: 04/25/25 0324    Order Status: Completed Specimen: Blood Updated: 04/27/25 0600     Blood Culture No Growth After 36 Hours            I have reviewed all pertinent labs within the past 24 hours.    Estimated Creatinine Clearance: 14.9 mL/min (A) (based on SCr of 3.1 mg/dL (H)).    Diagnostic Results:  I have reviewed all pertinent imaging results/findings within the past 24 hours.  "

## 2025-04-27 NOTE — SIGNIFICANT EVENT
Late morning/early afternoon, pt able to follow commands after propofol turned off for SAT. Pt passed SBT with exception of cuff leak. Attempted to use racemic epi inhaled, no cuff leak after initial dose. Will retry in 4 hours. Pt visibly in pain, crying with ETT and pointing to chest for discomfort similar to prior to intubation. Started precedex for pt comfort and prn fentanyl q 12 hr (reduced frequency given renal impairment). Requested nursing to wait until at least 2 hr EEG recording completed before giving first dose of fentanyl.     If no cuff leak/pass after next dose of racemic epi, will consider adding steroids. Can try decadron 4-6mg q 6 hours or methyprednisone.     Stat echo pending. EEG being placed. Concern for CAV possibility causing chest pain. May need to consider angiography per discussion with Dr La.

## 2025-04-27 NOTE — ASSESSMENT & PLAN NOTE
Ms. Damon is a 68 y/o AAF s/p OHTx 5/27/93 at [facility], s/p PPM same date, mild anemia and leukopenia, OA, cervical spine DJD with radiculopathy and chronic neck pain. Four years ago after her annual check up she was found to have BRCA, underwent excision, chemo/radiation, had a rough time of it, with some complications related to it, however recovered well. Transferred from [facility] ED due to chest pain. She initially was at the [facility] for pain management procedure for chronic back pain. In preprocedure area complained of nausea and chest pain having taken a sublingual nitro prior to arrival. Reported intermittent chest pain > 1 week. Took all regular medications on an empty stomach and per notes had n/v after starting IV. ECG with repol abnormalities. Noted to be hypokalemic and hypomagnesemic. Troponin I mildly elevated.     Plan:   - HS ordered. ECG repeated bifascicular block noted, old ST depression high lateral leads, T wave inversion anterolateral leads.   - Both TTE and AYAAN done. Repeating TTE today post   - Will need to consider angiogram study in light of PEA arrest suspect possible CAV as cause, will need to weigh with CKD

## 2025-04-27 NOTE — PROGRESS NOTES
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 ROSC achieved after round of CPR w/ 1 dose of epi. Post cardiac arrest she was intubated for airway protection. Was very slow to wake up (unresponsive for 2-3 hours post sedation stopping) but around 1130 woke up and started following all commands.    Continuous Infusions:   dexmedeTOMIDine (Precedex) infusion (titrating)  0-1.4 mcg/kg/hr Intravenous Continuous 3.33 mL/hr at 04/27/25 1338 0.2 mcg/kg/hr at 04/27/25 1338    nicardipine  0-15 mg/hr Intravenous Continuous        propofoL  0-50 mcg/kg/min Intravenous Continuous   Stopped at 04/27/25 0855     Scheduled Meds:   aluminum-magnesium hydroxide-simethicone  30 mL Oral QID (AC & HS)    aspirin  81 mg Oral Daily    calcitRIOL  0.25 mcg Oral Daily    carvediloL  3.125 mg Oral BID WM    chlorhexidine  15 mL Mouth/Throat BID    cycloSPORINE modified (NEORAL)  75 mg Oral BID    NIFEdipine  60 mg Oral Daily    pantoprazole  40 mg Oral Daily    pregabalin  50 mg Oral BID    propofoL        propofoL        rocuronium        sertraline  25 mg Oral Daily    succinylcholine        sucralfate  1 g Oral Q6H    tiZANidine  4 mg Oral BID     PRN Meds:  Current Facility-Administered Medications:     acetaminophen, 650 mg, Oral, Q4H PRN    aluminum-magnesium hydroxide-simethicone, 30 mL, Oral, Q6H PRN    nitroGLYCERIN, 0.4 mg, Sublingual, Q5 Min PRN    nitroGLYCERIN, 0.4 mg, Sublingual, Q5 Min PRN    ondansetron, 8 mg, Oral, Q8H PRN    oxyCODONE, 5 mg, Oral, Q4H PRN    polyethylene  glycol, 17 g, Oral, BID PRN    propofoL, , ,     propofoL, , ,     racepinephrine, 0.5 mL, Nebulization, Q4H PRN    rocuronium, , ,     sodium chloride 0.9%, 10 mL, Intravenous, PRN    succinylcholine, , ,     zolpidem, 5 mg, Oral, Nightly PRN    Review of patient's allergies indicates:   Allergen Reactions    Dobutamine Other (See Comments)     Severe Left sided chest pain after dosage administered for Dobutamine Stress Test; itching    Lisinopril Swelling and Rash    Hydrocodone-acetaminophen Nausea Only    Augmentin [amoxicillin-pot clavulanate] Diarrhea    Zyvox [linezolid] Nausea And Vomiting     Objective:     Vital Signs (Most Recent):  Temp: 96.3 °F (35.7 °C) (04/27/25 1215)  Pulse: 69 (04/27/25 1215)  Resp: (!) 25 (04/27/25 1215)  BP: (!) 141/93 (04/27/25 1215)  SpO2: 100 % (04/27/25 1215) Vital Signs (24h Range):  Temp:  [96.1 °F (35.6 °C)-99.2 °F (37.3 °C)] 96.3 °F (35.7 °C)  Pulse:  [] 69  Resp:  [16-46] 25  SpO2:  [94 %-100 %] 100 %  BP: ()/() 141/93     Patient Vitals for the past 72 hrs (Last 3 readings):   Weight   04/27/25 0745 64.3 kg (141 lb 12.1 oz)   04/26/25 2100 66.5 kg (146 lb 9.7 oz)     Body mass index is 25.93 kg/m².      Intake/Output Summary (Last 24 hours) at 4/27/2025 1357  Last data filed at 4/27/2025 1301  Gross per 24 hour   Intake 232.65 ml   Output 1449 ml   Net -1216.35 ml          Physical Exam  Constitutional:       General: She is not in acute distress.     Appearance: Normal appearance. She is not ill-appearing.   HENT:      Head: Normocephalic and atraumatic.   Cardiovascular:      Rate and Rhythm: Normal rate and regular rhythm.      Pulses: Normal pulses.      Heart sounds: Normal heart sounds.   Pulmonary:      Effort: Pulmonary effort is normal.      Breath sounds: Normal breath sounds.   Chest:      Comments: L ribcage tender to touch  Abdominal:      General: Abdomen is flat. There is no distension.      Palpations: Abdomen is soft.      Tenderness:  "There is no abdominal tenderness.   Musculoskeletal:      Right lower leg: No edema.      Left lower leg: No edema.   Skin:     General: Skin is warm and dry.   Neurological:      General: No focal deficit present.      Mental Status: She is alert and oriented to person, place, and time.   Psychiatric:         Mood and Affect: Mood normal.         Behavior: Behavior normal.            Significant Labs:  CBC:  Recent Labs   Lab 04/25/25  0319 04/26/25  0533 04/27/25  0747   WBC 3.70* 2.57* 2.32*   RBC 3.43* 3.14* 3.30*   HGB 10.1* 9.2* 9.6*   HCT 30.1* 28.4* 30.0*    148* 136*   MCV 88 90 91   MCH 29.4 29.3 29.1   MCHC 33.6 32.4 32.0     BNP:  Recent Labs   Lab 04/24/25  1343   *     CMP:  Recent Labs   Lab 04/24/25  1343 04/25/25  0319 04/26/25  0533 04/27/25  0747   CALCIUM 8.7 8.7 8.5* 7.5*   ALBUMIN 3.6  --   --  2.6*    144 141 140   K 3.3* 4.2 3.9 3.6   CO2 20* 18* 24 17*    111* 106 109   BUN 36* 33* 31* 33*   CREATININE 3.4* 3.0* 2.9* 3.1*   ALKPHOS 136  --   --  108   ALT 29  --   --  377*   AST 39  --   --  631*   BILITOT 0.5  --   --  0.5      Coagulation:   Recent Labs   Lab 04/27/25  0747   INR 1.0   APTT 22.5     LDH:  No results for input(s): "LDH" in the last 72 hours.  Microbiology:  Microbiology Results (last 7 days)       Procedure Component Value Units Date/Time    Blood culture [[id number]] Collected: 04/27/25 0942    Order Status: Resulted Specimen: Blood from Peripheral, Antecubital, Left Updated: 04/27/25 0945    Blood culture [[id number]] Collected: 04/27/25 0923    Order Status: Resulted Specimen: Blood from Peripheral, Antecubital, Right Updated: 04/27/25 0933    Blood culture [[id number]]  (Normal) Collected: 04/25/25 0319    Order Status: Completed Specimen: Blood Updated: 04/27/25 0600     Blood Culture No Growth After 36 Hours    Blood culture [[id number]]  (Normal) Collected: 04/25/25 0324    Order Status: Completed Specimen: Blood Updated: 04/27/25 0600     " Blood Culture No Growth After 36 Hours            I have reviewed all pertinent labs within the past 24 hours.    Estimated Creatinine Clearance: 14.9 mL/min (A) (based on SCr of 3.1 mg/dL (H)).    Diagnostic Results:  I have reviewed all pertinent imaging results/findings within the past 24 hours.  Assessment and Plan:     Ms. Damon is a 68 y/o AAF s/p OHTx 5/27/93 at [facility], s/p PPM same date, mild anemia and leukopenia, OA, cervical spine DJD with radiculopathy and chronic neck pain who presents for her 31st post annual transplant evaluation. Four years ago after her annual was found to have BRCA, underwent excision, chemo/radiation, had a rough time of it, with some complications related to it, however recovered well.      Transferred from [facility] ED due to chest pain. She initially was at the [facility] for pain management procedure for chronic back pain. In preprocedure area complained of nausea and chest pain having taken a sublingual nitro prior to arrival. Reported intermittent chest pain > 1 week. Took all regular medications on an empty stomach and per notes had n/v after starting IV. ECG with repol abnormalities. Noted to be hypokalemic and hypomagnesemic. Troponin I mildly elevated.     Off note, a few months ago was evaluated by [facility] for a reducible ventral hernia containing portion of the liver no bowel noted on exam or imaging. At that time also reported chronic nausea. Elective hernia repair deemed not emergent and if she should require intervention to be done at center with cardiac surgery and Cardiology heart failure services.      Cardiac PET 11/15/2024    The myocardial perfusion images show no evidence of ischemia or scar.    The whole heart absolute myocardial perfusion values were elevated at rest, low normal during stress and CFR is mildly reduced in part due to elevated resting flow.    CT attenuation images demonstrate no coronary calcifications and mild diffuse aortic calcifications in  the ascending aorta, in the descending aorta and moderate calcfications in the aortic arch.    The gated perfusion images showed an ejection fraction of 56% at rest and 59% during stress. A normal ejection fraction is greater than 47%.    There is normal wall motion at rest and normal wall motion during stress.    The study's ECG is negative for ischemia.       TTE 07/20/2024:    Left Ventricle: The left ventricle is normal in size. Normal wall thickness. There is normal systolic function with a visually estimated ejection fraction of 55 - 60%. There is normal diastolic function.    Right Ventricle: Normal right ventricular cavity size. Wall thickness is normal. Systolic function is normal.    Left Atrium: Patent foramen ovale visualized with predominant right to left shunting indicated by saline contrast.    Tricuspid Valve: There is mild regurgitation.    IVC/SVC: Normal venous pressure at 3 mmHg.    The patient is status post cardiac transplantation.  PFO not present with bubble alone, but with valsalva had very small bubbles come across         Cxr: Comparison is made to January 4, 2023.  Electrical lead overlies the lower chest and upper abdomen.  Surgical clips overlie the upper abdomen.  Mediastinal silhouette is prominent.  The lungs are clear.  No pneumothorax or significant pleural effusion     DSE 05/24/21:  The left ventricle is normal in size with concentric remodeling and normal systolic function.  The patient reached the end of the protocol.  There were no arrhythmias during stress.  Severe left atrial enlargement.  The estimated ejection fraction is 55%.  Grade II left ventricular diastolic dysfunction.  Normal right ventricular size with normal right ventricular systolic function.  Mild-to-moderate mitral regurgitation.  Mild to moderate tricuspid regurgitation.  Normal central venous pressure (3 mmHg).  The estimated PA systolic pressure is 39 mmHg.  The stress echo portion of this study is  negative for myocardial ischemia.  Severe left atrial enlargement.  The ECG portion of this study is negative for myocardial ischemia.    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 Damon is a 70 y/o AAF s/p OHTx 5/27/93 at [facility], s/p PPM same date, mild anemia and leukopenia, OA, cervical spine DJD with radiculopathy and chronic neck pain. Four years ago after her annual check up she was found to have BRCA, underwent excision, chemo/radiation, had a rough time of it, with some complications related to it, however recovered well. [facility]red from [facility] due to chest pain. She initially was at the [facility] for pain management procedure for chronic back pain. In preprocedure area complained of nausea and chest pain having taken a sublingual nitro prior to arrival. Reported intermittent chest pain > 1 week. Took all regular medications on an empty stomach and per notes had n/v after starting IV. ECG with repol abnormalities. Noted to be hypokalemic and hypomagnesemic. Troponin I mildly elevated.     Plan:   - HS ordered. ECG repeated bifascicular block noted, old ST depression high lateral leads, T wave inversion anterolateral leads.   - Both TTE and AYAAN done. Repeating TTE today post   - Will need to consider angiogram study in light of PEA arrest suspect possible CAV as cause, will need to weigh with CKD    Cardiac arrest  Unclear etiology but suspect possible CAV as cause   Repeat TTE pending as well as EEG  Patient now starting to wake up and follow commands, CTH w/ no acute findings  Remains intubated for airway protection, no airleak this AM so decision not to extubate and start racemic epi    Chronic nausea  Chronic N/V. Obtaining KUB. Replacing electrolytes    Hypomagnesemia  -replacing Mag and monitor renal function    Hypokalemia  -replacing electrolytes as needed while monitoring renal function    Anemia  Stable, no acute signs of bleeding    Chronic right-sided  thoracic back pain  -continue home medications    Chronic idiopathic constipation  - prn laxatives    Abnormal serum thyroid stimulating hormone (TSH) level  -repeating thyroid panel    Chest pain  Still having CP that shoots to her shoulder blade that she describes as new as compared to her chronic pain.   AYAAN done 4/27 w/ no dissection flap visualized.    Anemia associated with stage 5 chronic renal failure  Stable renal function    Immunocompromised state due to drug therapy  S/p heart transplant    Essential hypertension  -uncontrolled. Continue adjusting medications      Uninterrupted Critical Care/Counseling Time (not including procedures): 60 min.       Huber Novak PA-C  Heart Transplant  [facility] - Cardiac Intensive Care

## 2025-04-27 NOTE — PLAN OF CARE
CICU DAILY GOALS       A: Awake    RASS: Goal -    Actual - RASS (Robledo Agitation-Sedation Scale): light sedation   Restraint necessity: Clinical Justification: Removing medical devices  B: Breath   SBT: Pass   C: Coordinate A & B, analgesics/sedatives   Pain: managed    SAT: Pass  D: Delirium   CAM-ICU:    E: Early(intubated/ Progressive (non-intubated) Mobility   MOVE Screen: Fail   Activity: Activity Management: Patient unable to perform activities  FAS: Feeding/Nutrition   Diet order: Diet/Nutrition Received: NPO,   Fluid restriction:     Nutritional Supplement Intake: Quantity 0, Type: Boost  T: Thrombus   DVT prophylaxis:    H: HOB Elevation   Head of Bed (HOB) Positioning: HOB at 30 degrees  U: Ulcer Prophylaxis   GI: yes  G: Glucose control   managed    S: Skin   Bathing/Skin Care: bath, complete;dressed/undressed;linen changed (04/27/25 0745)  Wounds: No  Wound care consulted: No  B: Bowel Function   no issues   I: Indwelling Catheters   Swain necessity:      Urethral Catheter 04/27/25 0800-Reason for Continuing Urinary Catheterization: Critically ill in ICU and requiring hourly monitoring of intake/output   CVC necessity: No  D: De-escalation Antibx   No  Plan for the day   Admit to CICU and stabilize patient. Pt was not responding to pain after pausing sedation this AM. STAT CT performed. Pt now awake, alert, and following commands without intervention. Extubation delayed d/t absence of cuff leak when deflated. Racemic epinephrine given by RTT x2.   Family/Goals of care/Code Status   Code Status: Full Code     No acute events throughout day, VS and assessment per flow sheet, patient progressing towards goals as tolerated, plan of care reviewed with Nadia Damon and family, all concerns addressed, will continue to monitor.

## 2025-04-27 NOTE — ASSESSMENT & PLAN NOTE
Still having CP that shoots to her shoulder blade that she describes as new as compared to her chronic pain.   AYAAN done 4/27 w/ no dissection flap visualized.

## 2025-04-27 NOTE — CODE DOCUMENTATION
07:00: Charge nurse was called to bedside due to pt's heart rate rapidly decreasing to the 30's. Previously the pt was ambulating from the bathroom to the bed with the primary nurse and PCT and stated that her chest was hurting.   07:06: Chest compression's were started due to the pt not responding and no pulse was detect while palpating. Rapid response and MD came to the bedside.  07:09: 1 dose of EPI was given. BG was 95  07:11: Pt was arousal  07:15: Pt started vomiting and suction was provided. /140 MAP: 179 HR: 114  07:20: Propofol was given and endotracheal intubation was placed. /93 HR:127

## 2025-04-27 NOTE — ANESTHESIA PROCEDURE NOTES
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  Patient Location:  Floor  Timeout:  4/27/2025 7:14 AM  Procedure Start Time:  4/27/2025 7:15 AM  Procedure End Time:  4/27/2025 7:15 AM  Staff:     Anesthesiologist Present: No    Intubation:     Induction:  Intravenous    Intubated:  Postinduction    Mask Ventilation:  Not attempted    Attempts:  1    Attempted By:  Resident anesthesiologist    Method of Intubation:  Video laryngoscopy    Blade:  Nettles 3    Laryngeal View Grade: Grade I - full view of chords      Difficult Airway Encountered?: No      Complications:  None    Airway Device:  Oral endotracheal tube    Airway Device Size:  7.5    Inflation Amount (mL):  10    Tube secured:  22    Secured at:  The lips    Placement Verified By:  Colorimetric ETCO2 device    Complicating Factors:  None    Findings Post-Intubation:  BS equal bilateral and atraumatic/condition of teeth unchanged

## 2025-04-27 NOTE — CODE/ RAPID DOCUMENTATION
CODE BLUE RAPID RESPONSE NURSE NOTE       Admit Date: 2025  LOS: 2  Code Status: Full Code   Date of Consult: 2025  : 1955  Age: 69 y.o.  Weight:   Wt Readings from Last 1 Encounters:   25 66.5 kg (146 lb 9.7 oz)     Sex: female  Race: Black or    Bed: [place] [address] A:   MRN: [id number]  Time Rapid Response Team page Received: 0706  Time Rapid Response Team at Bedside: 07  Time Rapid Response Team left Bedside: 07  Was the patient discharged from an ICU this admission? Yes   Was the patient discharged from a PACU within last 24 hours? No   Did the patient receive conscious sedation/general anesthesia in last 24 hours? No  Was the patient in the ED within the past 24 hours? No  Was the patient on NIPPV within the past 24 hours? No   Did this progress into an ARC or CPA: Cardio Pulmonary Arrest  Attending Physician: Parker Le, *  Primary Service: [facility] HEART TRANSPLANT TEAM 2    SITUATION                   Notified by Pager.  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 difficile colitis, Cellulitis of axilla, left, Chronic diastolic heart failure, Chronic kidney disease, Chronic midline low back pain without sciatica, CKD (chronic kidney disease) stage 4, GFR 15-29 ml/min, Closed nondisplaced fracture of distal phalanx of left great toe with routine healing, Coronary artery disease, COVID-19 in immunocompromised patient, Cystitis, Depression, Encounter for blood transfusion, Encounter for palliative  care, Fibromyalgia, Heart failure, Heart transplanted, History of hyperparathyroidism; Hyperparathyroidism, secondary renal, Hypertension, Immune disorder, Immunodeficiency secondary to radiation therapy, Impaired mobility, Iron deficiency anemia, Kidney stones, Obesity, Obesity (BMI 30.0-34.9), Other osteoporosis without current pathological fracture, Other pancytopenia, Parotitis, acute, Pharyngitis, Pneumonia due to infectious organism, PONV (postoperative nausea and vomiting), Severe sepsis, Shingles, Stage 4 chronic kidney disease, Subclinical hypothyroidism, Thrombocytopenia, unspecified, Trouble in sleeping, Unresponsiveness, and Urinary incontinence.    24 Hours Vitals Range:  Temp:  [98.6 °F (37 °C)-99.2 °F (37.3 °C)]   Pulse:  [70-82]   Resp:  [16-19]   BP: (115-157)/(57-83)   SpO2:  [94 %-100 %]     Labs:  Recent Labs     04/25/25 0319 04/26/25  0533 04/27/25  0747   WBC 3.70* 2.57* 2.32*   HGB 10.1* 9.2* 9.6*   HCT 30.1* 28.4* 30.0*    148* 136*       Recent Labs     04/25/25 0319 04/26/25  0533 04/27/25  0747    141 140   K 4.2 3.9 3.6   * 106 109   CO2 18* 24 17*   BUN 33* 31* 33*   CREATININE 3.0* 2.9* 3.1*   MG 1.9 2.0 2.0      ASSESSMENT/INTERVENTIONS    What did you find: 70 y/o female in bed pulseless and apneic, CPR in progress    Time of CPR initiation: 0706  Initial rhythm: PEA  Time of first shock: N/A  Time of first Epinephrine dose: 0709  Total number of Epinephrine doses: 1  ETCO2 utilized to guide CPR: Yes  Patient on telemetry prior to arrest: yes  IV access present and patent prior to event: yes  Debrief completed after event: yes     Please see Code Blue Documentation for more details.    OUTCOME    ROSC obtained at 0711.     Disposition: Tx in ICU bed 3084.    CODE TEAM MEMBERS    : Dr. Ning Copeland    Resident/LIA:  Arlette Dawn NP    RRN: Lidia Donis RN, Bruno Ivory RN    Charge RN: Destiny Martinez RN     RRT: BERNICE Sheridan    Additional staff:   MD Sumi Anesthesiology

## 2025-04-27 NOTE — PROGRESS NOTES
Update    Pt intubated, LCSW went to room to check on caregiver, caregivers not present. HTS SWers will continue to follow Pt and caregivers. SW providing ongoing psychosocial, counseling, & emotional support, education, resources, assistance, and discharge planning as indicated.  SW to continue to follow.

## 2025-04-27 NOTE — EICU
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 difficile colitis, Cellulitis of axilla, left, Chronic diastolic heart failure, Chronic kidney disease, Chronic midline low back pain without sciatica, CKD (chronic kidney disease) stage 4, GFR 15-29 ml/min, Closed nondisplaced fracture of distal phalanx of left great toe with routine healing, Coronary artery disease, COVID-19 in immunocompromised patient, Cystitis, Depression, Encounter for blood transfusion, Encounter for palliative care, Fibromyalgia, Heart failure, Heart transplanted, History of hyperparathyroidism; Hyperparathyroidism, secondary renal, Hypertension, Immune disorder, Immunodeficiency secondary to radiation therapy, Impaired mobility, Iron deficiency anemia, Kidney stones, Obesity, Obesity (BMI 30.0-34.9), Other osteoporosis without current pathological fracture, Other pancytopenia, Parotitis, acute, Pharyngitis, Pneumonia due to infectious organism, PONV (postoperative nausea and vomiting), Severe sepsis, Shingles, Stage 4 chronic kidney disease, Subclinical hypothyroidism, Thrombocytopenia, unspecified, Trouble in sleeping, Unresponsiveness, and Urinary incontinence.    Last VS: /79   Pulse 64   Temp 97 °F (36.1 °C) (Core Bladder)   Resp 20   Ht 5' 2" (1.575 m)   Wt 64.3 kg (141 lb 12.1 oz)   LMP 1983 (Within Years)   SpO2 98%   BMI 25.93 kg/m² "       VICU Review    VICU nurse assessment :  [place] completed, LDA documentation reconciliation completed, Ventilator settings reviewed , Stress ulcer prophylaxis for ventilated patients review, and VTE prophylaxis review

## 2025-04-27 NOTE — ASSESSMENT & PLAN NOTE
Unclear etiology but suspect possible CAV as cause   Repeat TTE pending as well as EEG  Patient now starting to wake up and follow commands, CTH w/ no acute findings  Remains intubated for airway protection, no airleak this AM so decision not to extubate and start racemic epi

## 2025-04-27 NOTE — PROGRESS NOTES
1030: Patient unable to follow commands or respond to pain off of sedation. Sedation has been delayed for over 2 hours. Patient jerking and blinking while protruding tongue out from mouth. Patient breathing over the vent. No corneal, cough, or gag reflex as of now. Blood pressures are significantly increasing while off sedation, currently 230s/130s. Huber DRAPER notified. HTS team at bedside now assessing patient. Orders for STAT CT head and to start Cardene drip. BP now down to 130s/70s after starting drip. Plan of care ongoing per primary team.     1130: After CT, patient more awake and involuntary movements are less frequent. Patient now able to follow commands.

## 2025-04-27 NOTE — RESPIRATORY THERAPY
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 difficile colitis, Cellulitis of axilla, left, Chronic diastolic heart failure, Chronic kidney disease, Chronic midline low back pain without sciatica, CKD (chronic kidney disease) stage 4, GFR 15-29 ml/min, Closed nondisplaced fracture of distal phalanx of left great toe with routine healing, Coronary artery disease, COVID-19 in immunocompromised patient, Cystitis, Depression, Encounter for blood transfusion, Encounter for palliative care, Fibromyalgia, Heart failure, Heart transplanted, History of hyperparathyroidism; Hyperparathyroidism, secondary renal, Hypertension, Immune disorder, Immunodeficiency secondary to radiation therapy, Impaired mobility, Iron deficiency anemia, Kidney stones, Obesity, Obesity (BMI 30.0-34.9), Other osteoporosis without current pathological fracture, Other pancytopenia, Parotitis, acute, Pharyngitis, Pneumonia due to infectious organism, PONV (postoperative nausea and vomiting), Severe sepsis, Shingles, Stage 4 chronic kidney disease, Subclinical hypothyroidism, Thrombocytopenia, unspecified, Trouble in sleeping, Unresponsiveness, and Urinary incontinence.    24 Hours Vitals Range:  Temp:  [96.1 °F (35.6 °C)-99.2 °F (37.3  °C)]   Pulse:  []   Resp:  [16-39]   BP: ()/()   SpO2:  [94 %-100 %]     Labs:    Recent Labs     04/25/25  0319 04/26/25  0533 04/27/25  0747    141 140   K 4.2 3.9 3.6   * 106 109   CO2 18* 24 17*   BUN 33* 31* 33*   CREATININE 3.0* 2.9* 3.1*   MG 1.9 2.0 2.0            ASSESSMENT/INTERVENTIONS    68 y/o female in bed pulseless and apneic. CPR in progress.     Was the patient on NIPPV prior to code?: No  Does the patient have a surgical airway: No  Was the patient intubated? Yes, Time: 0712, Tube size: 7.5, Secured at: 22 @ lip, Confirmed via EtCO2: yes   ETCO2 monitored: yes  Ambu at bedside: yes    BVM ventilation initiated at: 0705    Please see Code Blue Documentation for more details.    OUTCOME    ROSC obtained at 0711.     Disposition: Tx in ICU bed 3084.    RT CODE TEAM MEMBERS    RRSRT: Arvin Downey, RRT, [id number]    Additional RTs: JUANI Rodriguez, RRT, LYNETTE Isabel, RRT

## 2025-04-28 PROBLEM — N18.4 CKD (CHRONIC KIDNEY DISEASE), STAGE IV: Status: ACTIVE | Noted: 2025-04-28

## 2025-04-28 PROBLEM — Z97.8 ENDOTRACHEALLY INTUBATED: Status: ACTIVE | Noted: 2025-04-28

## 2025-04-28 LAB
ABSOLUTE EOSINOPHIL (OHS): 0.01 K/UL
ABSOLUTE MONOCYTE (OHS): 0.28 K/UL (ref 0.3–1)
ABSOLUTE NEUTROPHIL COUNT (OHS): 2.51 K/UL (ref 1.8–7.7)
ALBUMIN SERPL BCP-MCNC: 3.2 G/DL (ref 3.5–5.2)
ALP SERPL-CCNC: 127 UNIT/L (ref 40–150)
ALT SERPL W/O P-5'-P-CCNC: 373 UNIT/L (ref 10–44)
ANION GAP (OHS): 14 MMOL/L (ref 8–16)
ASCENDING AORTA: 2.4 CM
AST SERPL-CCNC: 599 UNIT/L (ref 11–45)
AV AREA BY CONTINUOUS VTI: 4.3 CM2
AV INDEX (PROSTH): 1.38
AV LVOT MEAN GRADIENT: 2 MMHG
AV LVOT PEAK GRADIENT: 3 MMHG
AV MEAN GRADIENT: 1 MMHG
AV PEAK GRADIENT: 3 MMHG
AV VALVE AREA BY VELOCITY RATIO: 3.5 CM²
AV VALVE AREA: 4.3 CM2
AV VELOCITY RATIO: 1.13
BASOPHILS # BLD AUTO: 0.01 K/UL
BASOPHILS NFR BLD AUTO: 0.3 %
BILIRUB SERPL-MCNC: 0.5 MG/DL (ref 0.1–1)
BSA FOR ECHO PROCEDURE: 1.74 M2
BUN SERPL-MCNC: 31 MG/DL (ref 8–23)
CALCIUM SERPL-MCNC: 8.6 MG/DL (ref 8.7–10.5)
CHLORIDE SERPL-SCNC: 104 MMOL/L (ref 95–110)
CO2 SERPL-SCNC: 21 MMOL/L (ref 23–29)
CREAT SERPL-MCNC: 2.8 MG/DL (ref 0.5–1.4)
CV ECHO LV RWT: 0.45 CM
DOP CALC AO PEAK VEL: 0.8 M/S
DOP CALC AO VTI: 14.9 CM
DOP CALC LVOT AREA: 3.1 CM2
DOP CALC LVOT DIAMETER: 2 CM
DOP CALC LVOT PEAK VEL: 0.9 M/S
DOP CALC LVOT STROKE VOLUME: 64.7 CM3
DOP CALCLVOT PEAK VEL VTI: 20.6 CM
E WAVE DECELERATION TIME: 138 MS
E/A RATIO: 1.22
E/E' RATIO: 10 M/S
ECHO EF ESTIMATED: 40 %
ECHO LV POSTERIOR WALL: 0.9 CM (ref 0.6–1.1)
EJECTION FRACTION: 45 %
ERYTHROCYTE [DISTWIDTH] IN BLOOD BY AUTOMATED COUNT: 15.9 % (ref 11.5–14.5)
FRACTIONAL SHORTENING: 17.5 % (ref 28–44)
GFR SERPLBLD CREATININE-BSD FMLA CKD-EPI: 18 ML/MIN/1.73/M2
GLUCOSE SERPL-MCNC: 86 MG/DL (ref 70–110)
HCT VFR BLD AUTO: 32.9 % (ref 37–48.5)
HGB BLD-MCNC: 10.9 GM/DL (ref 12–16)
IMM GRANULOCYTES # BLD AUTO: 0.03 K/UL (ref 0–0.04)
IMM GRANULOCYTES NFR BLD AUTO: 0.8 % (ref 0–0.5)
INTERVENTRICULAR SEPTUM: 0.9 CM (ref 0.6–1.1)
LEFT INTERNAL DIMENSION IN SYSTOLE: 3.3 CM (ref 2.1–4)
LEFT VENTRICLE DIASTOLIC VOLUME INDEX: 41.76 ML/M2
LEFT VENTRICLE DIASTOLIC VOLUME: 71 ML
LEFT VENTRICLE MASS INDEX: 64.5 G/M2
LEFT VENTRICLE SYSTOLIC VOLUME INDEX: 25.3 ML/M2
LEFT VENTRICLE SYSTOLIC VOLUME: 43 ML
LEFT VENTRICULAR INTERNAL DIMENSION IN DIASTOLE: 4 CM (ref 3.5–6)
LEFT VENTRICULAR MASS: 109.7 G
LV LATERAL E/E' RATIO: 9
LV SEPTAL E/E' RATIO: 12
LYMPHOCYTES # BLD AUTO: 0.71 K/UL (ref 1–4.8)
MAGNESIUM SERPL-MCNC: 2.4 MG/DL (ref 1.6–2.6)
MCH RBC QN AUTO: 29.6 PG (ref 27–31)
MCHC RBC AUTO-ENTMCNC: 33.1 G/DL (ref 32–36)
MCV RBC AUTO: 89 FL (ref 82–98)
MV PEAK A VEL: 0.59 M/S
MV PEAK E VEL: 0.72 M/S
NUCLEATED RBC (/100WBC) (OHS): 0 /100 WBC
OHS CV RV/LV RATIO: 0.85 CM
OHS QRS DURATION: 150 MS
OHS QRS DURATION: 156 MS
OHS QRS DURATION: 166 MS
OHS QTC CALCULATION: 531 MS
OHS QTC CALCULATION: 535 MS
OHS QTC CALCULATION: 548 MS
PHOSPHATE SERPL-MCNC: 3.6 MG/DL (ref 2.7–4.5)
PISA TR MAX VEL: 2.5 M/S
PLATELET # BLD AUTO: 149 K/UL (ref 150–450)
PMV BLD AUTO: 11 FL (ref 9.2–12.9)
POTASSIUM SERPL-SCNC: 4.5 MMOL/L (ref 3.5–5.1)
PROT SERPL-MCNC: 8.4 GM/DL (ref 6–8.4)
RA PRESSURE ESTIMATED: 3 MMHG
RBC # BLD AUTO: 3.68 M/UL (ref 4–5.4)
RELATIVE EOSINOPHIL (OHS): 0.3 %
RELATIVE LYMPHOCYTE (OHS): 20 % (ref 18–48)
RELATIVE MONOCYTE (OHS): 7.9 % (ref 4–15)
RELATIVE NEUTROPHIL (OHS): 70.7 % (ref 38–73)
RIGHT VENTRICLE DIASTOLIC BASEL DIMENSION: 3.4 CM
RV TB RVSP: 6 MMHG
SINUS: 3.11 CM
SODIUM SERPL-SCNC: 139 MMOL/L (ref 136–145)
STJ: 3.1 CM
TDI LATERAL: 0.08 M/S
TDI SEPTAL: 0.06 M/S
TDI: 0.07 M/S
TRICUSPID ANNULAR PLANE SYSTOLIC EXCURSION: 0.9 CM
TV PEAK GRADIENT: 26 MMHG
TV REST PULMONARY ARTERY PRESSURE: 28 MMHG
WBC # BLD AUTO: 3.55 K/UL (ref 3.9–12.7)
Z-SCORE OF LEFT VENTRICULAR DIMENSION IN END DIASTOLE: -1.67
Z-SCORE OF LEFT VENTRICULAR DIMENSION IN END SYSTOLE: 0.95

## 2025-04-28 PROCEDURE — 99900026 HC AIRWAY MAINTENANCE (STAT): Mod: HCNC

## 2025-04-28 PROCEDURE — 99233 SBSQ HOSP IP/OBS HIGH 50: CPT | Mod: HCNC,,, | Performed by: INTERNAL MEDICINE

## 2025-04-28 PROCEDURE — 94003 VENT MGMT INPAT SUBQ DAY: CPT | Mod: HCNC

## 2025-04-28 PROCEDURE — 63600175 PHARM REV CODE 636 W HCPCS: Mod: HCNC | Performed by: PHYSICIAN ASSISTANT

## 2025-04-28 PROCEDURE — 25000003 PHARM REV CODE 250: Mod: HCNC

## 2025-04-28 PROCEDURE — 63600175 PHARM REV CODE 636 W HCPCS: Mod: HCNC

## 2025-04-28 PROCEDURE — 25000003 PHARM REV CODE 250: Mod: HCNC | Performed by: STUDENT IN AN ORGANIZED HEALTH CARE EDUCATION/TRAINING PROGRAM

## 2025-04-28 PROCEDURE — 80053 COMPREHEN METABOLIC PANEL: CPT | Mod: HCNC

## 2025-04-28 PROCEDURE — 99291 CRITICAL CARE FIRST HOUR: CPT | Mod: FS,HCNC,, | Performed by: PHYSICIAN ASSISTANT

## 2025-04-28 PROCEDURE — 84100 ASSAY OF PHOSPHORUS: CPT | Mod: HCNC

## 2025-04-28 PROCEDURE — 85025 COMPLETE CBC W/AUTO DIFF WBC: CPT | Mod: HCNC

## 2025-04-28 PROCEDURE — 94761 N-INVAS EAR/PLS OXIMETRY MLT: CPT | Mod: HCNC

## 2025-04-28 PROCEDURE — 99292 CRITICAL CARE ADDL 30 MIN: CPT | Mod: HCNC,,, | Performed by: INTERNAL MEDICINE

## 2025-04-28 PROCEDURE — 83735 ASSAY OF MAGNESIUM: CPT | Mod: HCNC

## 2025-04-28 PROCEDURE — 99900035 HC TECH TIME PER 15 MIN (STAT): Mod: HCNC

## 2025-04-28 PROCEDURE — 94799 UNLISTED PULMONARY SVC/PX: CPT | Mod: HCNC

## 2025-04-28 PROCEDURE — 27100171 HC OXYGEN HIGH FLOW UP TO 24 HOURS: Mod: HCNC

## 2025-04-28 PROCEDURE — 20000000 HC ICU ROOM: Mod: HCNC

## 2025-04-28 RX ORDER — SODIUM CHLORIDE 9 MG/ML
INJECTION, SOLUTION INTRAVENOUS CONTINUOUS
Status: ACTIVE | OUTPATIENT
Start: 2025-04-28 | End: 2025-04-28

## 2025-04-28 RX ORDER — ENOXAPARIN SODIUM 100 MG/ML
30 INJECTION SUBCUTANEOUS EVERY 24 HOURS
Status: DISCONTINUED | OUTPATIENT
Start: 2025-04-28 | End: 2025-05-06 | Stop reason: HOSPADM

## 2025-04-28 RX ORDER — CEFTRIAXONE 1 G/1
1 INJECTION, POWDER, FOR SOLUTION INTRAMUSCULAR; INTRAVENOUS
Status: DISCONTINUED | OUTPATIENT
Start: 2025-04-28 | End: 2025-04-30

## 2025-04-28 RX ADMIN — ALUMINUM HYDROXIDE, MAGNESIUM HYDROXIDE, AND SIMETHICONE 30 ML: 200; 200; 20 SUSPENSION ORAL at 04:04

## 2025-04-28 RX ADMIN — CYCLOSPORINE 75 MG: 100 SOLUTION ORAL at 05:04

## 2025-04-28 RX ADMIN — SERTRALINE HYDROCHLORIDE 25 MG: 25 TABLET ORAL at 08:04

## 2025-04-28 RX ADMIN — PREGABALIN 50 MG: 50 CAPSULE ORAL at 08:04

## 2025-04-28 RX ADMIN — DEXMEDETOMIDINE HYDROCHLORIDE 0.3 MCG/KG/HR: 4 INJECTION INTRAVENOUS at 05:04

## 2025-04-28 RX ADMIN — TIZANIDINE 4 MG: 4 TABLET ORAL at 08:04

## 2025-04-28 RX ADMIN — DEXMEDETOMIDINE HYDROCHLORIDE 0.6 MCG/KG/HR: 4 INJECTION INTRAVENOUS at 09:04

## 2025-04-28 RX ADMIN — NICARDIPINE HYDROCHLORIDE 5 MG/HR: 0.2 INJECTION, SOLUTION INTRAVENOUS at 04:04

## 2025-04-28 RX ADMIN — CEFTRIAXONE 1 G: 1 INJECTION, POWDER, FOR SOLUTION INTRAMUSCULAR; INTRAVENOUS at 12:04

## 2025-04-28 RX ADMIN — ALUMINUM HYDROXIDE, MAGNESIUM HYDROXIDE, AND SIMETHICONE 30 ML: 200; 200; 20 SUSPENSION ORAL at 08:04

## 2025-04-28 RX ADMIN — ACETAMINOPHEN 650 MG: 325 TABLET ORAL at 08:04

## 2025-04-28 RX ADMIN — FENTANYL CITRATE 25 MCG: 50 INJECTION INTRAMUSCULAR; INTRAVENOUS at 04:04

## 2025-04-28 RX ADMIN — CYCLOSPORINE 75 MG: 100 SOLUTION ORAL at 08:04

## 2025-04-28 RX ADMIN — CARVEDILOL 3.12 MG: 3.12 TABLET, FILM COATED ORAL at 08:04

## 2025-04-28 RX ADMIN — SUCRALFATE 1 G: 1 SUSPENSION ORAL at 12:04

## 2025-04-28 RX ADMIN — CARVEDILOL 3.12 MG: 3.12 TABLET, FILM COATED ORAL at 05:04

## 2025-04-28 RX ADMIN — NICARDIPINE HYDROCHLORIDE 5 MG/HR: 0.2 INJECTION, SOLUTION INTRAVENOUS at 09:04

## 2025-04-28 RX ADMIN — CALCITRIOL CAPSULES 0.25 MCG 0.25 MCG: 0.25 CAPSULE ORAL at 08:04

## 2025-04-28 RX ADMIN — SUCRALFATE 1 G: 1 SUSPENSION ORAL at 05:04

## 2025-04-28 RX ADMIN — FENTANYL CITRATE 25 MCG: 50 INJECTION INTRAMUSCULAR; INTRAVENOUS at 09:04

## 2025-04-28 RX ADMIN — ALUMINUM HYDROXIDE, MAGNESIUM HYDROXIDE, AND SIMETHICONE 30 ML: 200; 200; 20 SUSPENSION ORAL at 12:04

## 2025-04-28 RX ADMIN — ENOXAPARIN SODIUM 30 MG: 30 INJECTION SUBCUTANEOUS at 05:04

## 2025-04-28 RX ADMIN — SODIUM CHLORIDE: 0.9 INJECTION, SOLUTION INTRAVENOUS at 06:04

## 2025-04-28 NOTE — PLAN OF CARE
CICU DAILY GOALS       A: Awake    RASS: Goal -    Actual - RASS (Robledo Agitation-Sedation Scale): light sedation   Restraint necessity: Clinical Justification: Removing medical devices  B: Breath   SBT: Not attempted   C: Coordinate A & B, analgesics/sedatives   Pain: managed    SAT: Not attempted  D: Delirium   CAM-ICU:    E: Early(intubated/ Progressive (non-intubated) Mobility   MOVE Screen: Fail   Activity: Activity Management: Patient unable to perform activities (Intubated)  FAS: Feeding/Nutrition   Diet order: Diet/Nutrition Received: NPO,   Fluid restriction:     Nutritional Supplement Intake: Quantity N/A, Type:  N/A  T: Thrombus   DVT prophylaxis: VTE Core Measure: Pharmacological prophylaxis initiated/maintained  H: HOB Elevation   Head of Bed (HOB) Positioning: HOB at 30 degrees  U: Ulcer Prophylaxis   GI: yes  G: Glucose control   managed    S: Skin   Bathing/Skin Care: bath, complete;dressed/undressed;linen changed (04/27/25 0745)  Wounds: No  Wound care consulted: No  B: Bowel Function   no issues   I: Indwelling Catheters   Swain necessity:      Urethral Catheter 04/27/25 0800-Reason for Continuing Urinary Catheterization: Critically ill in ICU and requiring hourly monitoring of intake/output   CVC necessity: Yes  D: De-escalation Antibx   Yes  Plan for the day   Monitor labs. Temp of 100. 2. Abx given. VSS.   Family/Goals of care/Code Status   Code Status: Full Code     No acute events throughout day, VS and assessment per flow sheet, patient progressing towards goals as tolerated, plan of care reviewed with Nadia Damon and family, all concerns addressed, will continue to monitor.

## 2025-04-28 NOTE — HPI
Nadia Damon is a 69 y.o. female with PMHx of HTN, heart transplant 32 years ago, CKD, CVA, anxiety, depression, fibromyalgia, breast cancer who is admitted to [facility] as a transfer from [facility] ED for chest pain. Patient presented to ED from [facility] for pain medicine procedure when noted to have chest pain and nausea in the pre-procedure area. She was transferred to [facility] for heart transplant specialty services. Admitted for CAD of transplanted heart with stable angina pectoris. She underwent AYAAN 4/26/25 for suspicion of aortic dissection which was negative. On 4/27/25, code blue was called for PEA arrest. ROSC achieved following compressions and 1mg epinephrine. Remained obtunded and began vomiting. Concern patient unable to protect airway and subsequently intubated. Later in the day, patient passed SBT with exception of cuff leak. Attempted racemic epinephrine with no improvement. Has not received steroids. Pulmonology consulted for assistance with extubation and c/f airway edema given no cuff leak.

## 2025-04-28 NOTE — SUBJECTIVE & OBJECTIVE
Interval History: Patient remains intubated. EEG in process; patient awake and following commands and responding appropriately. She is complaining of chest pain with inspiration; likely from CPR. Pulm contact for extubation.  She has been doing well on SBT all morning.     Continuous Infusions:   dexmedeTOMIDine (Precedex) infusion (titrating)  0-1.4 mcg/kg/hr Intravenous Continuous 4.99 mL/hr at 04/28/25 0601 0.3 mcg/kg/hr at 04/28/25 0601    nicardipine  0-15 mg/hr Intravenous Continuous 25 mL/hr at 04/28/25 0908 5 mg/hr at 04/28/25 0908     Scheduled Meds:   aluminum-magnesium hydroxide-simethicone  30 mL Oral QID (AC & HS)    aspirin  81 mg Oral Daily    calcitRIOL  0.25 mcg Oral Daily    carvediloL  3.125 mg Oral BID WM    cefTRIAXone (Rocephin) IV (PEDS and ADULTS)  1 g Intravenous Q24H    cycloSPORINE  75 mg Per NG tube BID    NIFEdipine  60 mg Oral Daily    pantoprazole  40 mg Oral Daily    potassium bicarbonate  30 mEq Per OG tube Once    pregabalin  50 mg Oral BID    sertraline  25 mg Oral Daily    sucralfate  1 g Oral Q6H    tiZANidine  4 mg Oral BID     PRN Meds:  Current Facility-Administered Medications:     acetaminophen, 650 mg, Oral, Q4H PRN    aluminum-magnesium hydroxide-simethicone, 30 mL, Oral, Q6H PRN    fentaNYL, 25 mcg, Intravenous, Q4H PRN    nitroGLYCERIN, 0.4 mg, Sublingual, Q5 Min PRN    nitroGLYCERIN, 0.4 mg, Sublingual, Q5 Min PRN    ondansetron, 8 mg, Oral, Q8H PRN    oxyCODONE, 5 mg, Oral, Q4H PRN    polyethylene glycol, 17 g, Oral, BID PRN    racepinephrine, 0.5 mL, Nebulization, Q4H PRN    sodium chloride 0.9%, 10 mL, Intravenous, PRN    zolpidem, 5 mg, Oral, Nightly PRN    Review of patient's allergies indicates:   Allergen Reactions    Dobutamine Other (See Comments)     Severe Left sided chest pain after dosage administered for Dobutamine Stress Test; itching    Lisinopril Swelling and Rash    Hydrocodone-acetaminophen Nausea Only    Augmentin [amoxicillin-pot clavulanate]  Diarrhea    Zyvox [linezolid] Nausea And Vomiting     Objective:     Vital Signs (Most Recent):  Temp: 99.9 °F (37.7 °C) (04/28/25 0807)  Pulse: 77 (04/28/25 0807)  Resp: 18 (04/28/25 0926)  BP: (!) 168/95 (04/28/25 0906)  SpO2: 96 % (04/28/25 0801) Vital Signs (24h Range):  Temp:  [96.1 °F (35.6 °C)-100.2 °F (37.9 °C)] 99.9 °F (37.7 °C)  Pulse:  [66-78] 77  Resp:  [12-47] 18  SpO2:  [94 %-100 %] 96 %  BP: (132-168)/() 168/95     Patient Vitals for the past 72 hrs (Last 3 readings):   Weight   04/27/25 0745 64.3 kg (141 lb 12.1 oz)   04/26/25 2100 66.5 kg (146 lb 9.7 oz)     Body mass index is 25.93 kg/m².      Intake/Output Summary (Last 24 hours) at 4/28/2025 0932  Last data filed at 4/28/2025 0601  Gross per 24 hour   Intake 248.55 ml   Output 2021 ml   Net -1772.45 ml          Physical Exam  Constitutional:       General: She is not in acute distress.     Appearance: Normal appearance. She is not ill-appearing.   HENT:      Head: Normocephalic and atraumatic.   Cardiovascular:      Rate and Rhythm: Normal rate and regular rhythm.      Pulses: Normal pulses.      Heart sounds: Normal heart sounds.   Pulmonary:      Effort: Pulmonary effort is normal.      Breath sounds: Normal breath sounds.      Comments: intubated  Chest:      Comments: L ribcage tender to touch  Abdominal:      General: Abdomen is flat. There is no distension.      Palpations: Abdomen is soft.      Tenderness: There is no abdominal tenderness.   Musculoskeletal:      Right lower leg: No edema.      Left lower leg: No edema.   Skin:     General: Skin is warm and dry.   Neurological:      General: No focal deficit present.      Mental Status: She is alert and oriented to person, place, and time.   Psychiatric:         Mood and Affect: Mood normal.         Behavior: Behavior normal.            Significant Labs:  CBC:  Recent Labs   Lab 04/26/25  0533 04/27/25  0747 04/28/25  0406   WBC 2.57* 2.32* 3.55*   RBC 3.14* 3.30* 3.68*   HGB 9.2*  "9.6* 10.9*   HCT 28.4* 30.0* 32.9*   * 136* 149*   MCV 90 91 89   MCH 29.3 29.1 29.6   MCHC 32.4 32.0 33.1     BNP:  Recent Labs   Lab 04/24/25  1343   *     CMP:  Recent Labs   Lab 04/24/25  1343 04/25/25  0319 04/27/25  0747 04/27/25  2131 04/28/25  0406   CALCIUM 8.7   < > 7.5* 8.1* 8.6*   ALBUMIN 3.6  --  2.6*  --  3.2*      < > 140 141 139   K 3.3*   < > 3.6 4.7 4.5   CO2 20*   < > 17* 21* 21*      < > 109 106 104   BUN 36*   < > 33* 33* 31*   CREATININE 3.4*   < > 3.1* 2.8* 2.8*   ALKPHOS 136  --  108  --  127   ALT 29  --  377*  --  373*   AST 39  --  631*  --  599*   BILITOT 0.5  --  0.5  --  0.5    < > = values in this interval not displayed.      Coagulation:   Recent Labs   Lab 04/27/25  0747   INR 1.0   APTT 22.5     LDH:  No results for input(s): "LDH" in the last 72 hours.  Microbiology:  Microbiology Results (last 7 days)       Procedure Component Value Units Date/Time    Blood culture [[id number]]  (Normal) Collected: 04/25/25 0319    Order Status: Completed Specimen: Blood Updated: 04/27/25 1801     Blood Culture No Growth After 48 Hours    Blood culture [[id number]]  (Normal) Collected: 04/25/25 0324    Order Status: Completed Specimen: Blood Updated: 04/27/25 1801     Blood Culture No Growth After 48 Hours    Blood culture [[id number]]  (Normal) Collected: 04/27/25 0923    Order Status: Completed Specimen: Blood from Peripheral, Antecubital, Right Updated: 04/27/25 1702     Blood Culture No Growth After 6 Hours    Blood culture [[id number]]  (Normal) Collected: 04/27/25 0942    Order Status: Completed Specimen: Blood from Peripheral, Antecubital, Left Updated: 04/27/25 1702     Blood Culture No Growth After 6 Hours    Urine culture [[id number]] Collected: 04/27/25 0845    Order Status: Sent Specimen: Urine, Catheterized Updated: 04/27/25 1425            I have reviewed all pertinent labs within the past 24 hours.    Estimated Creatinine Clearance: 16.5 mL/min (A) (based " on SCr of 2.8 mg/dL (H)).    Diagnostic Results:  I have reviewed all pertinent imaging results/findings within the past 24 hours.

## 2025-04-28 NOTE — PROGRESS NOTES
Follow-up    Spoke with HIMA Curry, at [facility] ([phone]) regarding IWONA they provide. Antoinette stated that they were an IL[facility] and can provide wellness check on residents. Discussed medical team safety concerns with Antoinette. Also, informed Antoinette that HIMA provided pt with [facility]-PCS brochure and phone number to apply for a personal care attendant (PCA). Antoinette informed HIMA that she will assist pt with applying for PCS and provide wellness checks. Antoinette also informed HIMA that she discussed medical alert system with pt in the past. Will f/u with Antoinette to confirm pt's dc.   SUBJECTIVE:     Yoana Vega is a 15 month old female, here for a routine health maintenance visit.    Patient was roomed by: Juliann Small    Well Child     Social History  Patient accompanied by:  Mother  Questions or concerns?: No    Forms to complete? No  Child lives with::  Mother and father  Who takes care of your child?:  Home with family member  Languages spoken in the home:  English and Malay  Recent family changes/ special stressors?:  None noted    Safety / Health Risk  Is your child around anyone who smokes?  No    TB Exposure:     No TB exposure    Car seat < 6 years old, in  back seat, rear-facing, 5-point restraint? NO    Home Safety Survey:      Stairs Gated?:  Yes     Wood stove / Fireplace screened?  Yes     Poisons / cleaning supplies out of reach?:  NO     Swimming pool?:  No     Firearms in the home?: No      Hearing / Vision  Hearing or vision concerns?  No concerns, hearing and vision subjectively normal    Daily Activities  Nutrition:  Good appetite, eats variety of foods, cows milk and cup  Vitamins & Supplements:  Yes      Vitamin type: flouride    Sleep      Sleep arrangement:co-sleeping with parent    Sleep pattern: sleeps through the night and regular bedtime routine    Elimination       Urinary frequency:4-6 times per 24 hours     Stool frequency: 1-3 times per 24 hours     Stool consistency: hard     Elimination problems:  Constipation    Dental     Water source:  Fluoride testing done *, bottled water and bottled water with fluoride    Dental provider: patient has a dental home    Risks: a parent has had a cavity in past 3 years      Dental visit recommended: Dental home established, continue care every 6 months  Dental varnish declined by parent    DEVELOPMENT  Screening tool used, reviewed with parent/guardian: No screening tool used  Milestones (by observation/exam/report) 75-90% ile  PERSONAL/ SOCIAL/COGNITIVE:    Imitates actions    Drinks from cup    Plays ball with  "you  LANGUAGE:    2-4 words besides mama/ she     Shakes head for \"no\"    Hands object when asked to  GROSS MOTOR:    Walks without help    [place]on and recovers     Climbs up on chair  FINE MOTOR/ ADAPTIVE:    Scribbles    Turns pages of book     Uses spoon    PROBLEM LIST  There is no problem list on file for this patient.    MEDICATIONS  Current Outpatient Medications   Medication Sig Dispense Refill     fluocinolone acetonide (DERMA SMOOTHE/FS BODY) 0.01 % external oil Apply topically 2 times daily as needed (eczema) (Patient not taking: Reported on 2/28/2019) 1 Bottle 1      ALLERGY  No Known Allergies    IMMUNIZATIONS  Immunization History   Administered Date(s) Administered     DTAP-IPV/HIB (PENTACEL) 2018, 2018, 2018     Hep B, Peds or Adolescent 2018, 2018, 2018     HepA-ped 2 Dose 01/25/2019     Influenza Vaccine IM Ages 6-35 Months 4 Valent (PF) 2018, 2018     MMR 2018, 01/25/2019     Pneumo Conj 13-V (2010&after) 2018, 2018, 2018     Rotavirus, monovalent, 2-dose 2018, 2018     Varicella 01/25/2019     HEALTH HISTORY SINCE LAST VISIT  No surgery, major illness or injury since last physical exam    ROS  Constitutional, eye, ENT, skin, respiratory, cardiac, GI, MSK, neuro, and allergy are normal except as otherwise noted.    OBJECTIVE:   EXAM  Pulse 140   Temp 97.9  F (36.6  C) (Tympanic)   Ht 0.787 m (2' 7\")   Wt 9.596 kg (21 lb 2.5 oz)   HC 44.5 cm (17.5\")   BMI 15.48 kg/m    57 %ile based on WHO (Girls, 0-2 years) Length-for-age data based on Length recorded on 5/3/2019.  45 %ile based on WHO (Girls, 0-2 years) weight-for-age data based on Weight recorded on 5/3/2019.  16 %ile based on WHO (Girls, 0-2 years) head circumference-for-age based on Head Circumference recorded on 5/3/2019.  GENERAL: Alert, well appearing, no distress  SKIN: Clear. No significant rash, abnormal pigmentation or lesions  HEAD: " Normocephalic.  EYES:  Symmetric light reflex and no eye movement on cover/uncover test. Normal conjunctivae.  EARS: Normal canals. Tympanic membranes are normal; gray and translucent.  NOSE: Normal without discharge.  MOUTH/THROAT: Clear. No oral lesions. Teeth without obvious abnormalities.  NECK: Supple, no masses.  No thyromegaly.  LYMPH NODES: No adenopathy  LUNGS: Clear. No rales, rhonchi, wheezing or retractions  HEART: Regular rhythm. Normal S1/S2. No murmurs. Normal pulses.  ABDOMEN: Soft, non-tender, not distended, no masses or hepatosplenomegaly. Bowel sounds normal.   GENITALIA: Normal female external genitalia. Shankar stage I,  No inguinal herniae are present.  EXTREMITIES: Full range of motion, no deformities  NEUROLOGIC: No focal findings. Cranial nerves grossly intact: DTR's normal. Normal gait, strength and tone    ASSESSMENT/PLAN:   1. Encounter for routine child health examination w/o abnormal findings  Growing and developing normally.  - Screening Questionnaire for Immunizations  - PNEUMOCOCCAL CONJ VACCINE 13 VALENT IM [[id number]]  - DTAP - HIB - IPV VACCINE, IM USE (Pentacel) [[id number]]  - VACCINE ADMINISTRATION, INITIAL  - VACCINE ADMINISTRATION, EACH ADDITIONAL    Anticipatory Guidance  The following topics were discussed:  SOCIAL/ FAMILY:    Enforce a few rules consistently    Stranger/ separation anxiety    Reading to child    Book given from Reach Out & Read program    Positive discipline    Delay toilet training    Hitting/ biting/ aggressive behavior    Tantrums  NUTRITION:    Healthy food choices    Weaning     Avoid choke foods    Avoid food conflicts    Iron, calcium sources    Age-related decrease in appetite    Limit juice to 4 ounces  HEALTH/ SAFETY:    Dental hygiene    Sleep issues    Sunscreen/insect repellent    Car seat    Never leave unattended    Exploration/ climbing    Chokable toys    Grocery carts    Burns/ water temp.    Water safety    Window screens    Preventive Care  Plan  Immunizations     See orders in EpicCare.  I reviewed the signs and symptoms of adverse effects and when to seek medical care if they should arise.  Referrals/Ongoing Specialty care: No   See other orders in EpicCare    Resources:  [place] Child and Teen Checkups (C&TC) Schedule of Age-Related Screening Standards    FOLLOW-UP:      18 month Preventive Care visit    Elena Jordan MD  [facility]

## 2025-04-28 NOTE — NURSING
Urine output decreased to 12.5 mls/hour for the last 2 consecutive hours and trending down prior. Notified Dr. Ureña. No new orders at this time.

## 2025-04-28 NOTE — NURSING
Rounds Report: Attended interdisciplinary rounds this afternoon with the transplant team including SW, physicians, fellows,  mid-level providers, and transplant coordinators. Discussed plan of care, including DC date, current medications and current plan to proceed with Extubate.

## 2025-04-28 NOTE — EICU
Intervention Initiated From:  [facility] / EICU    Diana intervened regarding:  Rounding (Video assessment)    VICU Night Rounds Checklist  24H Vital Sign Range:  Temp:  [96.1 °F (35.6 °C)-99.2 °F (37.3 °C)]   Pulse:  []   Resp:  [15-46]   BP: ()/()   SpO2:  [94 %-100 %]     Video rounds and LDA reconciliation        Nursing orders placed : IP CRISTAL Peripheral IV Access

## 2025-04-28 NOTE — PLAN OF CARE
CICU DAILY GOALS       A: Awake    RASS: Goal -    Actual - RASS (Robledo Agitation-Sedation Scale): drowsy   Restraint necessity: Clinical Justification: Removing medical devices  B: Breath   SBT: Pass   C: Coordinate A & B, analgesics/sedatives   Pain: managed    SAT: Pass  D: Delirium   CAM-ICU:    E: Early(intubated/ Progressive (non-intubated) Mobility   MOVE Screen: Fail   Activity: Activity Management: Arm raise - L1, Ankle pumps - L1  FAS: Feeding/Nutrition   Diet order: Diet/Nutrition Received: NPO,   Fluid restriction:     Nutritional Supplement Intake: Quantity 0, Type: Boost  T: Thrombus   DVT prophylaxis: VTE Core Measure: Pharmacological prophylaxis initiated/maintained  H: HOB Elevation   Head of Bed (HOB) Positioning: HOB at 30 degrees  U: Ulcer Prophylaxis   GI: no  G: Glucose control   managed    S: Skin   Bathing/Skin Care: bath, complete;dressed/undressed;linen changed (04/27/25 0745)  Wounds: No  Wound care consulted: No  B: Bowel Function   no issues   I: Indwelling Catheters   Swain necessity:      Urethral Catheter 04/27/25 0800-Reason for Continuing Urinary Catheterization: Critically ill in ICU and requiring hourly monitoring of intake/output   CVC necessity: No  D: De-escalation Antibx   No  Plan for the day   Reassess extubation plan. Pulmonology consulted. Plan is to start steroids and extubate tomorrow morning. No acute events this shift.   Family/Goals of care/Code Status   Code Status: Full Code     No acute events throughout day, VS and assessment per flow sheet, patient progressing towards goals as tolerated, plan of care reviewed with Nadia Damon and family, all concerns addressed, will continue to monitor.

## 2025-04-28 NOTE — PROCEDURES
EEG REPORT      Nadia Damon  [id number]  1955    DATE OF SERVICE: 4/27/2025     -1,2    METHODOLOGY      Extended electroencephalographic recording is made while the patient is ambulatory and continuing normal daily activities.  Electrodes are placed according to the International 10-20 placement system and included T1 and T2 electrode placement.  Twenty four (24) channels of digital signal (sampling rate of 512/sec) was simultaneously recorded from the scalp including EKG and eye monitors.  Recording band pass was 0.1 to 100 hz and all data was stored digitally on the recorder.  The patient is instructed to press an event button when clinical symptoms occur and write the symptoms into a diary. Activation procedures which include photic stimulation, hyperventilation and instructing patients to perform simple task are done in selected patients.        The EEG is displayed on a monitor screen and can be reformatted into different montages for evaluation.  The entire recoding is submitted for computer assisted analysis to detect spike and electrographic seizure activity.  The entire recording is visually reviewed and the times identified by computer analysis as being spikes or seizures are reviewed again.  Compresses spectral analysis (CSA) is also performed on the activity recorded from each individual channel.  This is displayed as a power display of frequencies from 0 to 30 Hz over time.   The CSA analysis is done and displayed continuously.  This is reviewed for asymmetries in power between homologous areas of the scalp and for presence of changes in power which canbe seen when seizures occur.  Sections of suspected abnormalities on the CSA is then compared with the original EEG recording.  .     BeSmart software was also utilized in the review of this study.  This software suite analyzes the EEG recording in multiple domains.  Coherence and rhythmicity is computed to identify EEG sections which may  contain organized seizures.  Each channel undergoes analysis to detect presence of spike and sharp waves which have special and morphological characteristic of epileptic activity.  The routine EEG recording is converted from spacial into frequency domain.  This is then displayed comparing homologous areas to identify areas of significant asymmetry.  Algorithm to identify non-cortically generated artifact is used to separate eye movement, EMG and other artifact from the EEG     Recording Times  Start on 4/27/2025  Stop on 4/28/2025    A total of 15:14:40 and 5:59:56 hours of EEG was recorded.      EEG FINDINGS:  Background activity:   The background rhythm was characterized by alpha and anterior dominant beta activity with a 10Hz posterior dominant alpha rhythm at 30-70 microvolts.   Symmetry and continuity: the background was continuous and symmetric     Sleep:   Normal sleep transients including sleep spindles, K complexes, vertex waves and POSTS were seen.    Activation procedures:   NA    Abnormal activity:   No epileptiform discharges, periodic discharges, lateralized rhythmic delta activity or electrographic seizures were seen.    IMPRESSION:   Normal one day video EEG      Kike Valenzuela MD  Neurology-Epilepsy.  [facility].

## 2025-04-28 NOTE — SUBJECTIVE & OBJECTIVE
Past Medical History:   Diagnosis Date    Abdominal wall hernia     CT Renal 6/11/2018---Small fat containing superior ventral abdominal wall hernia at the epicardial pacing lead site.    Abnormal mammogram 10/12/2021    Acute cystitis without hematuria 05/10/2022    Acute ischemic right middle cerebral artery (MCA) stroke 07/20/2024    Adverse drug reaction 11/12/2024    GRACE (acute kidney injury) 11/22/2021    Anxiety     Aphasia 07/19/2024    Arthritis     ZEN HIPS    Breast cancer in female 08/2021    LEFT BREAST    Breast mass, right 11/13/2024    RIGHT BREAST MASS (Problem)      Bronchitis 08/18/2016    Never smoked      C. difficile colitis 11/29/2021    Cellulitis of axilla, left 12/23/2021    Chronic diastolic heart failure 12/16/2021    Chronic kidney disease     stage 4, GFR 15-29 ml/min    Chronic midline low back pain without sciatica 06/18/2018    CKD (chronic kidney disease) stage 4, GFR 15-29 ml/min 04/20/2016    US Retro   5/16/2018---Mild cortical thinning and increased cortical echogenicity.  Findings can be seen with medical renal disease.  8/31/216--- Echogenic kidneys with diffuse cortical thinning suggesting  medical renal disease. 2 complex right renal cortical cysts.      Closed nondisplaced fracture of distal phalanx of left great toe with routine healing 10/22/2018    Coronary artery disease 1993    heart transplant    COVID-19 in immunocompromised patient 02/26/2024    Cystitis 05/10/2022    Depression     Encounter for blood transfusion     Encounter for palliative care 10/14/2024    Fibromyalgia     on Lyrica    Heart failure     native heart cardiomyopathy    Heart transplanted 1993    due to cardiomyopathy    History of hyperparathyroidism; Hyperparathyroidism, secondary renal     PT DENIES    Hypertension     Immune disorder     anti rejection meds    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    Impaired mobility 07/28/2022    Iron deficiency anemia 08/15/2017     Kidney stones     passed per pt    Obesity     Obesity (BMI 30.0-34.9) 07/22/2019    Other osteoporosis without current pathological fracture 08/30/2019    Other pancytopenia 05/06/2024    Parotitis, acute 09/19/2023    Pharyngitis 01/09/2019    Pneumonia due to infectious organism 03/14/2024    PONV (postoperative nausea and vomiting)     Severe sepsis 11/22/2021    Shingles 2003 approx    left leg    Stage 4 chronic kidney disease 11/22/2021    Subclinical hypothyroidism 06/16/2023    Thrombocytopenia, unspecified 11/29/2021    Trouble in sleeping     Unresponsiveness 11/13/2024    Urinary incontinence        Past Surgical History:   Procedure Laterality Date    bladder implant Right 06/11/2024    BLADDER SURGERY  2015 approx    mesh - Dr Everett then 2nd reconstructive sx Dr Onofre    BREAST BIOPSY Bilateral     NEGATIVE    BREAST BIOPSY Right 10/31/2022    benign    BREAST LUMPECTOMY Left 2021    BREAST SURGERY Left 09/28/2015    Bx - benign    BREAST SURGERY Right 12/2015    Bx benign    CARDIAC PACEMAKER REMOVAL Left 06/26/2014    Pacer defirillator removed. Put in 1993 aat time of heart transplant    CARPAL TUNNEL RELEASE Left 03/03/2015    Dr. Hall    COLONOSCOPY N/A 02/25/2021    Procedure: COLONOSCOPY;  Surgeon: Freida Ramirez MD;  Location: [facility];  Service: Endoscopy;  Laterality: N/A;    CYSTOCELE REPAIR      Twice with mesh removal    EPIDURAL STEROID INJECTION INTO CERVICAL SPINE N/A 02/02/2023    Procedure: T11/T12 IL HELLEN;  Surgeon: Jassi Pierre MD;  Location: [facility];  Service: Pain Management;  Laterality: N/A;    EPIDURAL STEROID INJECTION INTO CERVICAL SPINE N/A 9/16/2024    Procedure: T11/T12 IL HELLEN;  Surgeon: Jassi Pierre MD;  Location: [facility];  Service: Pain Management;  Laterality: N/A;    HEART TRANSPLANT  1993    HERNIA REPAIR Right 1971 approx    Inguinal    HYSTERECTOMY  1983    vag hyst /LSO     IMPLANTATION OF PERMANENT SACRAL NERVE STIMULATOR N/A  06/11/2024    Procedure: INSERTION, NEUROSTIMULATOR, PERMANENT, SACRAL;  Surgeon: Sami Cochran MD;  Location: [facility] OR;  Service: Urology;  Laterality: N/A;    INCISION AND DRAINAGE OF ABSCESS Left 12/24/2021    Procedure: INCISION AND DRAINAGE, ABSCESS;  Surgeon: Joseph Longo MD;  Location: [facility] OR;  Service: General;  Laterality: Left;    INJECTION OF ANESTHETIC AGENT AROUND MEDIAL BRANCH NERVES INNERVATING LUMBAR FACET JOINT Right 10/19/2022    Procedure: Right L4/L5 and L5/S1 MBB;  Surgeon: Jassi Pierre MD;  Location: [facility];  Service: Pain Management;  Laterality: Right;    INJECTION OF ANESTHETIC AGENT AROUND MEDIAL BRANCH NERVES INNERVATING LUMBAR FACET JOINT Right 11/09/2022    Procedure: Right L4/L5 and L5/S1 MBB;  Surgeon: Jassi Pierre MD;  [facility];  Service: Pain Management;  Laterality: Right;    INJECTION OF ANESTHETIC AGENT AROUND MEDIAL BRANCH NERVES INNERVATING LUMBAR FACET JOINT Left 2/6/2025    Procedure: Left L4-5 and L5-S1 MBB #1 with local;  Surgeon: Jassi Pierre MD;  Location: [facility];  Service: Pain Management;  Laterality: Left;    INJECTION OF ANESTHETIC AGENT AROUND MEDIAL BRANCH NERVES INNERVATING LUMBAR FACET JOINT Left 3/19/2025    Procedure: Left L4-5 and L5-S1 MBB #2 with local;  Surgeon: Jassi Pierre MD;  Location: [facility];  Service: Pain Management;  Laterality: Left;    INJECTION OF ANESTHETIC AGENT INTO SACROILIAC JOINT Right 08/22/2022    Procedure: Right SIJ Injection Right L5/S1 Facte Injection;  Surgeon: Jassi Pierre MD;  Location: [facility];  Service: Pain Management;  Laterality: Right;    INSERTION OF TUNNELED CENTRAL VENOUS CATHETER (CVC) WITH SUBCUTANEOUS PORT N/A 11/09/2021    Procedure: [id number];  Surgeon: Christoph Douglas MD;  Location: [facility] OR;  Service: General;  Laterality: N/A;    RADIOFREQUENCY ABLATION Right 12/5/2024    Procedure: Right L4-5 and L5-S1 RFA;  Surgeon:  Jassi Pierre MD;  Location: [facility];  Service: Pain Management;  Laterality: Right;    RADIOFREQUENCY THERMOCOAGULATION Right 12/07/2022    Procedure: Right L4/L5 and L5/S1 Lumbar RFA;  Surgeon: Jassi Pierre MD;  Location: [facility];  Service: Pain Management;  Laterality: Right;    REMOVAL OF ELECTRODE LEAD OF SACRAL NERVE STIMULATOR N/A 2/18/2025    Procedure: REMOVAL, ELECTRODE LEAD, SACRAL NERVE STIMULATOR;  Surgeon: Sami Cochran MD;  Location: [facility];  Service: Urology;  Laterality: N/A;    REMOVAL OF VASCULAR ACCESS PORT      ROBOT-ASSISTED LAPAROSCOPIC ABDOMINAL SACROCOLPOPEXY N/A 08/10/2023    Procedure: ROBOTIC SACROCOLPOPEXY, ABDOMEN;  Surgeon: PRANAY Villalobos MD;  Location: [facility];  Service: OB/GYN;  Laterality: N/A;    ROBOT-ASSISTED LAPAROSCOPIC OOPHORECTOMY Right 08/10/2023    Procedure: ROBOTIC OOPHORECTOMY;  Surgeon: PRANAY Villalobos MD;  Location: [facility] OR;  Service: OB/GYN;  Laterality: Right;    SENTINEL LYMPH NODE BIOPSY Left 10/12/2021    Procedure: BIOPSY, LYMPH NODE, SENTINEL;  Surgeon: Christoph Douglas MD;  Location: [facility];  Service: General;  Laterality: Left;    TOE SURGERY      XI ROBOTIC URETHROPEXY N/A 08/10/2023    Procedure: XI ROBOTIC URETHROPEXY;  Surgeon: PRANAY Villalobos MD;  Location: [facility];  Service: OB/GYN;  Laterality: N/A;       Review of patient's allergies indicates:   Allergen Reactions    Dobutamine Other (See Comments)     Severe Left sided chest pain after dosage administered for Dobutamine Stress Test; itching    Lisinopril Swelling and Rash    Hydrocodone-acetaminophen Nausea Only    Augmentin [amoxicillin-pot clavulanate] Diarrhea    Zyvox [linezolid] Nausea And Vomiting       Family History       Problem Relation (Age of Onset)    Breast cancer Mother, Maternal Grandmother    Cancer Mother (38)    Cataracts Cousin    Heart disease Maternal Grandmother    Hypertension Son          Tobacco Use    Smoking status: Never      Passive exposure: Never    Smokeless tobacco: Never   Substance and Sexual Activity    Alcohol use: Never     Alcohol/week: 0.0 standard drinks of alcohol    Drug use: No    Sexual activity: Not Currently     Partners: Male     Birth control/protection: See Surgical Hx         Review of Systems   Unable to perform ROS: Intubated     Objective:     Vital Signs (Most Recent):  Temp: 99.7 °F (37.6 °C) (04/28/25 1030)  Pulse: 75 (04/28/25 1030)  Resp: (!) 9 (04/28/25 1030)  BP: 109/72 (04/28/25 1030)  SpO2: 98 % (04/28/25 1030) Vital Signs (24h Range):  Temp:  [96.3 °F (35.7 °C)-100.2 °F (37.9 °C)] 99.7 °F (37.6 °C)  Pulse:  [68-78] 75  Resp:  [9-47] 9  SpO2:  [94 %-100 %] 98 %  BP: ()/() 109/72     Weight: 69.3 kg (152 lb 12.5 oz)  Body mass index is 27.94 kg/m².      Intake/Output Summary (Last 24 hours) at 4/28/2025 1114  Last data filed at 4/28/2025 1001  Gross per 24 hour   Intake 341.19 ml   Output 2078 ml   Net -1736.81 ml        Physical Exam  Constitutional:       General: She is not in acute distress.     Appearance: Normal appearance. She is not ill-appearing.      Interventions: She is sedated, intubated and restrained.   HENT:      Head: Normocephalic and atraumatic.   Eyes:      Extraocular Movements: Extraocular movements intact.   Cardiovascular:      Rate and Rhythm: Normal rate and regular rhythm.   Pulmonary:      Effort: Pulmonary effort is normal. She is intubated.      Breath sounds: No wheezing or rales.   Abdominal:      General: Abdomen is flat.      Palpations: Abdomen is soft.      Tenderness: There is no abdominal tenderness.   Musculoskeletal:      Right lower leg: No edema.      Left lower leg: No edema.   Skin:     General: Skin is warm and dry.   Neurological:      Mental Status: She is easily aroused.          Vents:  Vent Mode: Spont (04/28/25 0807)  Ventilator Initiated: Yes (04/27/25 1808)  Set Rate: 18 BPM (04/27/25 1143)  Vt Set: 430 mL (04/27/25 1143)  Pressure Support:  5 cmH20 (04/28/25 0807)  PEEP/CPAP: 5 cmH20 (04/28/25 0807)  Oxygen Concentration (%): 30 (04/28/25 1030)  Peak Airway Pressure: 10 cmH20 (04/28/25 0807)  Plateau Pressure: 0 cmH20 (04/28/25 0807)  Total Ve: 6.38 L/m (04/28/25 0807)  Negative Inspiratory Force (cm H2O): -28 (04/28/25 0807)  F/VT Ratio<105 (RSBI): (!) 58.31 (04/28/25 0807)    Lines/Drains/Airways       Drain  Duration                  NG/OG Tube 04/27/25 0900 Center mouth 1 day         Urethral Catheter 04/27/25 0800 1 day              Airway  Duration                  Airway - Non-Surgical 04/27/25 0720 Endotracheal Tube 1 day              Peripheral Intravenous Line  Duration                  Peripheral IV - Single Lumen 04/25/25 1043 20 G 1 3/4 in Anterior;Right Upper Arm 3 days         Peripheral IV - Single Lumen 04/27/25 0854 20 G Anterior;Left Forearm 1 day                    Significant Labs:    CBC/Anemia Profile:  Recent Labs   Lab 04/27/25  0747 04/28/25  0406   WBC 2.32* 3.55*   HGB 9.6* 10.9*   HCT 30.0* 32.9*   * 149*   MCV 91 89   RDW 15.8* 15.9*        Chemistries:  Recent Labs   Lab 04/27/25  0747 04/27/25  2131 04/28/25  0406    141 139   K 3.6 4.7 4.5    106 104   CO2 17* 21* 21*   BUN 33* 33* 31*   CREATININE 3.1* 2.8* 2.8*   CALCIUM 7.5* 8.1* 8.6*   ALBUMIN 2.6*  --  3.2*   BILITOT 0.5  --  0.5   ALKPHOS 108  --  127   *  --  373*   *  --  599*   GLUCOSE 162* 97 86   MG 2.0  --  2.4   PHOS  --   --  3.6       All pertinent labs within the past 24 hours have been reviewed.    Significant Imaging:   I have reviewed all pertinent imaging results/findings within the past 24 hours.

## 2025-04-28 NOTE — CONSULTS
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 female with PMHx of HTN, heart transplant 32 years ago, CKD, CVA, anxiety, depression, fibromyalgia, breast cancer [place] [facility] [place] [facility] for chest pain. Patient presented to ED from [facility] for pain medicine procedure when noted to have chest pain and nausea in the pre-procedure area. [place] [facility] [place]. Admitted for CAD of transplanted heart with stable angina pectoris. She underwent AYAAN 4/26/25 for suspicion of aortic dissection which was negative. On 4/27/25, code blue was called for PEA arrest. ROSC achieved following compressions and 1mg epinephrine. Remained obtunded and began vomiting. Concern patient unable to protect airway and subsequently intubated. Later in the day, patient passed SBT with exception of cuff leak. Attempted racemic epinephrine with no improvement. Has not received steroids. Pulmonology consulted for assistance with extubation and c/f airway edema given no cuff leak.     Past Medical History:   Diagnosis Date    Abdominal wall hernia     CT Renal 6/11/2018---Small fat containing superior ventral abdominal wall hernia at the epicardial pacing lead site.    Abnormal mammogram 10/12/2021    Acute cystitis without hematuria 05/10/2022    Acute ischemic right middle cerebral artery (MCA) stroke 07/20/2024    Adverse drug reaction 11/12/2024    GRACE (acute kidney  injury) 11/22/2021    Anxiety     Aphasia 07/19/2024    Arthritis     ZEN HIPS    Breast cancer in female 08/2021    LEFT BREAST    Breast mass, right 11/13/2024    RIGHT BREAST MASS (Problem)      Bronchitis 08/18/2016    Never smoked      C. difficile colitis 11/29/2021    Cellulitis of axilla, left 12/23/2021    Chronic diastolic heart failure 12/16/2021    Chronic kidney disease     stage 4, GFR 15-29 ml/min    Chronic midline low back pain without sciatica 06/18/2018    CKD (chronic kidney disease) stage 4, GFR 15-29 ml/min 04/20/2016    US Retro   5/16/2018---Mild cortical thinning and increased cortical echogenicity.  Findings can be seen with medical renal disease.  8/31/216--- Echogenic kidneys with diffuse cortical thinning suggesting  medical renal disease. 2 complex right renal cortical cysts.      Closed nondisplaced fracture of distal phalanx of left great toe with routine healing 10/22/2018    Coronary artery disease 1993    heart transplant    COVID-19 in immunocompromised patient 02/26/2024    Cystitis 05/10/2022    Depression     Encounter for blood transfusion     Encounter for palliative care 10/14/2024    Fibromyalgia     on Lyrica    Heart failure     native heart cardiomyopathy    Heart transplanted 1993    due to cardiomyopathy    History of hyperparathyroidism; Hyperparathyroidism, secondary renal     PT DENIES    Hypertension     Immune disorder     anti rejection meds    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    Impaired mobility 07/28/2022    Iron deficiency anemia 08/15/2017    Kidney stones     passed per pt    Obesity     Obesity (BMI 30.0-34.9) 07/22/2019    Other osteoporosis without current pathological fracture 08/30/2019    Other pancytopenia 05/06/2024    Parotitis, acute 09/19/2023    Pharyngitis 01/09/2019    Pneumonia due to infectious organism 03/14/2024    PONV (postoperative nausea and vomiting)     Severe sepsis 11/22/2021    Shingles 2003 approx     left leg    Stage 4 chronic kidney disease 11/22/2021    Subclinical hypothyroidism 06/16/2023    Thrombocytopenia, unspecified 11/29/2021    Trouble in sleeping     Unresponsiveness 11/13/2024    Urinary incontinence        Past Surgical History:   Procedure Laterality Date    bladder implant Right 06/11/2024    BLADDER SURGERY  2015 approx    mesh - Dr Everett then 2nd reconstructive sx Dr Onofre    BREAST BIOPSY Bilateral     NEGATIVE    BREAST BIOPSY Right 10/31/2022    benign    BREAST LUMPECTOMY Left 2021    BREAST SURGERY Left 09/28/2015    Bx - benign    BREAST SURGERY Right 12/2015    Bx benign    CARDIAC PACEMAKER REMOVAL Left 06/26/2014    Pacer defirillator removed. Put in 1993 aat time of heart transplant    CARPAL TUNNEL RELEASE Left 03/03/2015    Dr. Hall    COLONOSCOPY N/A 02/25/2021    Procedure: COLONOSCOPY;  Surgeon: Freida Ramirez MD;  Location: [facility];  Service: Endoscopy;  Laterality: N/A;    CYSTOCELE REPAIR      Twice with mesh removal    EPIDURAL STEROID INJECTION INTO CERVICAL SPINE N/A 02/02/2023    Procedure: T11/T12 IL HELLEN;  Surgeon: Jassi Pierre MD;  Location: [facility];  Service: Pain Management;  Laterality: N/A;    EPIDURAL STEROID INJECTION INTO CERVICAL SPINE N/A 9/16/2024    Procedure: T11/T12 IL HELLEN;  Surgeon: Jassi Pierre MD;  Location: [facility];  Service: Pain Management;  Laterality: N/A;    HEART TRANSPLANT  1993    HERNIA REPAIR Right 1971 approx    Inguinal    HYSTERECTOMY  1983    vag hyst /LSO     IMPLANTATION OF PERMANENT SACRAL NERVE STIMULATOR N/A 06/11/2024    Procedure: INSERTION, NEUROSTIMULATOR, PERMANENT, SACRAL;  Surgeon: Sami Cochran MD;  Location: [facility] OR;  Service: Urology;  Laterality: N/A;    INCISION AND DRAINAGE OF ABSCESS Left 12/24/2021    Procedure: INCISION AND DRAINAGE, ABSCESS;  Surgeon: Joseph Longo MD;  Location: [facility] OR;  Service: General;  Laterality: Left;    INJECTION OF ANESTHETIC AGENT AROUND MEDIAL  BRANCH NERVES INNERVATING LUMBAR FACET JOINT Right 10/19/2022    Procedure: Right L4/L5 and L5/S1 MBB;  Surgeon: Jassi Pierre MD;  Location: [facility];  Service: Pain Management;  Laterality: Right;    INJECTION OF ANESTHETIC AGENT AROUND MEDIAL BRANCH NERVES INNERVATING LUMBAR FACET JOINT Right 11/09/2022    Procedure: Right L4/L5 and L5/S1 MBB;  Surgeon: Jassi Pierre MD;  Location: [facility];  Service: Pain Management;  Laterality: Right;    INJECTION OF ANESTHETIC AGENT AROUND MEDIAL BRANCH NERVES INNERVATING LUMBAR FACET JOINT Left 2/6/2025    Procedure: Left L4-5 and L5-S1 MBB #1 with local;  Surgeon: Jassi Pierre MD;  Location: [facility];  Service: Pain Management;  Laterality: Left;    INJECTION OF ANESTHETIC AGENT AROUND MEDIAL BRANCH NERVES INNERVATING LUMBAR FACET JOINT Left 3/19/2025    Procedure: Left L4-5 and L5-S1 MBB #2 with local;  Surgeon: Jassi Pierre MD;  Location: [facility];  Service: Pain Management;  Laterality: Left;    INJECTION OF ANESTHETIC AGENT INTO SACROILIAC JOINT Right 08/22/2022    Procedure: Right SIJ Injection Right L5/S1 Facte Injection;  Surgeon: Jassi Pierre MD;  Location: [facility];  Service: Pain Management;  Laterality: Right;    INSERTION OF TUNNELED CENTRAL VENOUS CATHETER (CVC) WITH SUBCUTANEOUS PORT N/A 11/09/2021    Procedure: [id number];  Surgeon: Christoph Douglas MD;  Location: [facility] OR;  Service: General;  Laterality: N/A;    RADIOFREQUENCY ABLATION Right 12/5/2024    Procedure: Right L4-5 and L5-S1 RFA;  Surgeon: Jassi Pierre MD;  Location: [facility];  Service: Pain Management;  Laterality: Right;    RADIOFREQUENCY THERMOCOAGULATION Right 12/07/2022    Procedure: Right L4/L5 and L5/S1 Lumbar RFA;  Surgeon: Jassi Pierre MD;  Location: [facility];  Service: Pain Management;  Laterality: Right;    REMOVAL OF ELECTRODE LEAD OF SACRAL NERVE STIMULATOR N/A 2/18/2025    Procedure: REMOVAL, ELECTRODE  LEAD, SACRAL NERVE STIMULATOR;  Surgeon: Sami Cochran MD;  Location: [facility] OR;  Service: Urology;  Laterality: N/A;    REMOVAL OF VASCULAR ACCESS PORT      ROBOT-ASSISTED LAPAROSCOPIC ABDOMINAL SACROCOLPOPEXY N/A 08/10/2023    Procedure: ROBOTIC SACROCOLPOPEXY, ABDOMEN;  Surgeon: PRANAY Villalobos MD;  Location: [facility] OR;  Service: OB/GYN;  Laterality: N/A;    ROBOT-ASSISTED LAPAROSCOPIC OOPHORECTOMY Right 08/10/2023    Procedure: ROBOTIC OOPHORECTOMY;  Surgeon: PRANAY Villalobos MD;  Location: [facility] OR;  Service: OB/GYN;  Laterality: Right;    SENTINEL LYMPH NODE BIOPSY Left 10/12/2021    Procedure: BIOPSY, LYMPH NODE, SENTINEL;  Surgeon: Christoph Douglas MD;  Location: [facility] OR;  Service: General;  Laterality: Left;    TOE SURGERY      XI ROBOTIC URETHROPEXY N/A 08/10/2023    Procedure: XI ROBOTIC URETHROPEXY;  Surgeon: PRANAY Villalobos MD;  Location: [facility] OR;  Service: OB/GYN;  Laterality: N/A;       Review of patient's allergies indicates:   Allergen Reactions    Dobutamine Other (See Comments)     Severe Left sided chest pain after dosage administered for Dobutamine Stress Test; itching    Lisinopril Swelling and Rash    Hydrocodone-acetaminophen Nausea Only    Augmentin [amoxicillin-pot clavulanate] Diarrhea    Zyvox [linezolid] Nausea And Vomiting       Family History       Problem Relation (Age of Onset)    Breast cancer Mother, Maternal Grandmother    Cancer Mother (38)    Cataracts Cousin    Heart disease Maternal Grandmother    Hypertension Son          Tobacco Use    Smoking status: Never     Passive exposure: Never    Smokeless tobacco: Never   Substance and Sexual Activity    Alcohol use: Never     Alcohol/week: 0.0 standard drinks of alcohol    Drug use: No    Sexual activity: Not Currently     Partners: Male     Birth control/protection: See Surgical Hx         Review of Systems   Unable to perform ROS: Intubated     Objective:     Vital Signs (Most Recent):  Temp: 99.7 °F (37.6 °C)  (04/28/25 1030)  Pulse: 75 (04/28/25 1030)  Resp: (!) 9 (04/28/25 1030)  BP: 109/72 (04/28/25 1030)  SpO2: 98 % (04/28/25 1030) Vital Signs (24h Range):  Temp:  [96.3 °F (35.7 °C)-100.2 °F (37.9 °C)] 99.7 °F (37.6 °C)  Pulse:  [68-78] 75  Resp:  [9-47] 9  SpO2:  [94 %-100 %] 98 %  BP: ()/() 109/72     Weight: 69.3 kg (152 lb 12.5 oz)  Body mass index is 27.94 kg/m².      Intake/Output Summary (Last 24 hours) at 4/28/2025 1114  Last data filed at 4/28/2025 1001  Gross per 24 hour   Intake 341.19 ml   Output 2078 ml   Net -1736.81 ml        Physical Exam  Constitutional:       General: She is not in acute distress.     Appearance: Normal appearance. She is not ill-appearing.      Interventions: She is sedated, intubated and restrained.   HENT:      Head: Normocephalic and atraumatic.   Eyes:      Extraocular Movements: Extraocular movements intact.   Cardiovascular:      Rate and Rhythm: Normal rate and regular rhythm.   Pulmonary:      Effort: Pulmonary effort is normal. She is intubated.      Breath sounds: No wheezing or rales.   Abdominal:      General: Abdomen is flat.      Palpations: Abdomen is soft.      Tenderness: There is no abdominal tenderness.   Musculoskeletal:      Right lower leg: No edema.      Left lower leg: No edema.   Skin:     General: Skin is warm and dry.   Neurological:      Mental Status: She is easily aroused.          Vents:  Vent Mode: Spont (04/28/25 0807)  Ventilator Initiated: Yes (04/27/25 1808)  Set Rate: 18 BPM (04/27/25 1143)  Vt Set: 430 mL (04/27/25 1143)  Pressure Support: 5 cmH20 (04/28/25 0807)  PEEP/CPAP: 5 cmH20 (04/28/25 0807)  Oxygen Concentration (%): 30 (04/28/25 1030)  Peak Airway Pressure: 10 cmH20 (04/28/25 0807)  Plateau Pressure: 0 cmH20 (04/28/25 0807)  Total Ve: 6.38 L/m (04/28/25 0807)  Negative Inspiratory Force (cm H2O): -28 (04/28/25 0807)  F/VT Ratio<105 (RSBI): (!) 58.31 (04/28/25 0807)    Lines/Drains/Airways       Drain  Duration                   NG/OG Tube 25 0900 Center mouth 1 day         Urethral Catheter 25 0800 1 day              Airway  Duration                  Airway - Non-Surgical 25 0720 Endotracheal Tube 1 day              Peripheral Intravenous Line  Duration                  Peripheral IV - Single Lumen 25 1043 20 G 1 3/4 in Anterior;Right Upper Arm 3 days         Peripheral IV - Single Lumen 25 0854 20 G Anterior;Left Forearm 1 day                    Significant Labs:    CBC/Anemia Profile:  Recent Labs   Lab 25  0747 25  0406   WBC 2.32* 3.55*   HGB 9.6* 10.9*   HCT 30.0* 32.9*   * 149*   MCV 91 89   RDW 15.8* 15.9*        Chemistries:  Recent Labs   Lab 25  0747 25  2131 25  0406    141 139   K 3.6 4.7 4.5    106 104   CO2 17* 21* 21*   BUN 33* 33* 31*   CREATININE 3.1* 2.8* 2.8*   CALCIUM 7.5* 8.1* 8.6*   ALBUMIN 2.6*  --  3.2*   BILITOT 0.5  --  0.5   ALKPHOS 108  --  127   *  --  373*   *  --  599*   GLUCOSE 162* 97 86   MG 2.0  --  2.4   PHOS  --   --  3.6       All pertinent labs within the past 24 hours have been reviewed.    Significant Imaging:   I have reviewed all pertinent imaging results/findings within the past 24 hours.    Assessment/Plan:     Palliative Care  Endotracheally intubated  69 y.o. female with PMHx of HTN, heart transplant 32 years ago, CKD, CVA, anxiety, depression, fibromyalgia, breast cancer who is [place] [facility] [place] from [facility] for chest pain.  Admitted for CAD of transplanted heart with stable angina pectoris, intubated following PEA arrest. Passing SBT with exception of cuff leak. Primary team attempted racemic epinephrine without improvement. Pulmonology consulted for cuff leak and assistance with extubation.     With ETT cuff deflated and following oropharyngeal suction, no audible transmitted upper airway sounds and no significant change in delivered vs  tidal volumes c/f possible  airway edema.     Recommendations:  -- recommend short course of glucocorticoids, consider 40mg methylprednisolone once, 4 hours prior to reassessment for extubation  -- If one time dose as above does not improve cuff leak, can consider additional 20mg methylprednisolone q4 hourly x2 - 3 doses prior to reassessment for extubation  -- additional racemic epinephrine unlikely to be of benefit               Rah Lai MD  Pulmonology  [facility] - Card[facility] Intensive Care

## 2025-04-28 NOTE — ASSESSMENT & PLAN NOTE
-Unclear etiology but suspect possible CAV as cause   -Patient now starting to wake up and follow commands, CTH w/ no acute findings  -Remains intubated for airway protection, no airleak this AM Pulm consulted and following

## 2025-04-28 NOTE — ASSESSMENT & PLAN NOTE
-ECG repeated bifascicular block noted, old ST depression high lateral leads, T wave inversion anterolateral leads.   -Last PET stress 11/2024  negative for ischemia  - Both TTE and AYAAN done. Repeating TTE today post   - Will need to consider angiogram study in light of PEA arrest suspect possible CAV as cause, will need to weigh with CKD  -ASA,

## 2025-04-28 NOTE — PROGRESS NOTES
Pharmacist Renal Dose Adjustment Note    Nadia Damon is a 69 y.o. female being treated with the medication Enoxaparin.    Patient Data:    Vital Signs (Most Recent):  Temp: 99.1 °F (37.3 °C) (04/28/25 1501)  Pulse: 71 (04/28/25 1501)  Resp: (!) 34 (04/28/25 1621)  BP: (!) 149/89 (04/28/25 1501)  SpO2: 100 % (04/28/25 1501) Vital Signs (72h Range):  Temp:  [96.1 °F (35.6 °C)-100.2 °F (37.9 °C)]   Pulse:  []   Resp:  [9-47]   BP: ()/()   SpO2:  [94 %-100 %]      Recent Labs   Lab 04/27/25  0747 04/27/25  2131 04/28/25  0406   CREATININE 3.1* 2.8* 2.8*     Serum creatinine: 2.8 mg/dL (H) 04/28/25 0406  Estimated creatinine clearance: 17 mL/min (A)    Medication:Enoxaparin dose: 40 mg  frequency every 24 hours will be changed to medication:Enoxaparin dose:30 mg frequency:every 24 hours    Pharmacist's Name: Caitlin Mohan  Pharmacist's Extension: [phone]

## 2025-04-28 NOTE — ASSESSMENT & PLAN NOTE
69 y.o. female with PMHx of HTN, heart transplant 32 years ago, CKD, CVA, anxiety, depression, fibromyalgia, breast cancer who is admitted to [facility] [place] as a transfer from [facility] ED for chest pain.  Admitted for CAD of transplanted heart with stable angina pectoris, intubated following PEA arrest. Passing SBT with exception of cuff leak. Primary team attempted racemic epinephrine without improvement. Pulmonology consulted for cuff leak and assistance with extubation.     With ETT cuff deflated and following oropharyngeal suction, no audible transmitted upper airway sounds and no significant change in delivered vs  tidal volumes c/f possible airway edema.     Recommendations:  -- recommend short course of glucocorticoids, consider 40mg methylprednisolone once, 4 hours prior to reassessment for extubation  -- If one time dose as above does not improve cuff leak, can consider additional 20mg methylprednisolone q4 hourly x2 - 3 doses prior to reassessment for extubation  -- additional racemic epinephrine unlikely to be of benefit

## 2025-04-28 NOTE — PROGRESS NOTES
Rounds    SW attended MDT rounds with physicians, fellows, APPs, pre and post transplant coordinators, and VAD coordinators. Discussed plan of care and discharge planning.

## 2025-04-28 NOTE — PROGRESS NOTES
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 EEG in process; patient awake and following commands and responding appropriately. She is complaining of chest pain with inspiration; likely from CPR. Pulm contact for extubation.  She has been doing well on SBT all morning.     Continuous Infusions:   dexmedeTOMIDine (Precedex) infusion (titrating)  0-1.4 mcg/kg/hr Intravenous Continuous 4.99 mL/hr at 04/28/25 0601 0.3 mcg/kg/hr at 04/28/25 0601    nicardipine  0-15 mg/hr Intravenous Continuous 25 mL/hr at 04/28/25 0908 5 mg/hr at 04/28/25 0908     Scheduled Meds:   aluminum-magnesium hydroxide-simethicone  30 mL Oral QID (AC & HS)    aspirin  81 mg Oral Daily    calcitRIOL  0.25 mcg Oral Daily    carvediloL  3.125 mg Oral BID WM    cefTRIAXone (Rocephin) IV (PEDS and ADULTS)  1 g Intravenous Q24H    cycloSPORINE  75 mg Per NG tube BID    NIFEdipine  60 mg Oral Daily    pantoprazole  40 mg Oral Daily    potassium bicarbonate  30 mEq Per OG tube Once    pregabalin  50 mg Oral BID    sertraline  25 mg Oral Daily    sucralfate  1 g Oral Q6H    tiZANidine  4 mg Oral BID     PRN Meds:  Current Facility-Administered Medications:     acetaminophen, 650 mg, Oral, Q4H PRN    aluminum-magnesium hydroxide-simethicone, 30 mL, Oral, Q6H PRN    fentaNYL, 25 mcg, Intravenous, Q4H PRN    nitroGLYCERIN, 0.4 mg, Sublingual, Q5 Min PRN    nitroGLYCERIN, 0.4 mg, Sublingual, Q5 Min PRN    ondansetron, 8 mg, Oral, Q8H PRN    oxyCODONE, 5 mg, Oral, Q4H PRN    polyethylene glycol, 17 g, Oral, BID PRN    racepinephrine, 0.5 mL, Nebulization, Q4H PRN    sodium chloride  0.9%, 10 mL, Intravenous, PRN    zolpidem, 5 mg, Oral, Nightly PRN    Review of patient's allergies indicates:   Allergen Reactions    Dobutamine Other (See Comments)     Severe Left sided chest pain after dosage administered for Dobutamine Stress Test; itching    Lisinopril Swelling and Rash    Hydrocodone-acetaminophen Nausea Only    Augmentin [amoxicillin-pot clavulanate] Diarrhea    Zyvox [linezolid] Nausea And Vomiting     Objective:     Vital Signs (Most Recent):  Temp: 99.9 °F (37.7 °C) (04/28/25 0807)  Pulse: 77 (04/28/25 0807)  Resp: 18 (04/28/25 0926)  BP: (!) 168/95 (04/28/25 0906)  SpO2: 96 % (04/28/25 0801) Vital Signs (24h Range):  Temp:  [96.1 °F (35.6 °C)-100.2 °F (37.9 °C)] 99.9 °F (37.7 °C)  Pulse:  [66-78] 77  Resp:  [12-47] 18  SpO2:  [94 %-100 %] 96 %  BP: (132-168)/() 168/95     Patient Vitals for the past 72 hrs (Last 3 readings):   Weight   04/27/25 0745 64.3 kg (141 lb 12.1 oz)   04/26/25 2100 66.5 kg (146 lb 9.7 oz)     Body mass index is 25.93 kg/m².      Intake/Output Summary (Last 24 hours) at 4/28/2025 0932  Last data filed at 4/28/2025 0601  Gross per 24 hour   Intake 248.55 ml   Output 2021 ml   Net -1772.45 ml          Physical Exam  Constitutional:       General: She is not in acute distress.     Appearance: Normal appearance. She is not ill-appearing.   HENT:      Head: Normocephalic and atraumatic.   Cardiovascular:      Rate and Rhythm: Normal rate and regular rhythm.      Pulses: Normal pulses.      Heart sounds: Normal heart sounds.   Pulmonary:      Effort: Pulmonary effort is normal.      Breath sounds: Normal breath sounds.      Comments: intubated  Chest:      Comments: L ribcage tender to touch  Abdominal:      General: Abdomen is flat. There is no distension.      Palpations: Abdomen is soft.      Tenderness: There is no abdominal tenderness.   Musculoskeletal:      Right lower leg: No edema.      Left lower leg: No edema.   Skin:     General: Skin is warm and dry.  "  Neurological:      General: No focal deficit present.      Mental Status: She is alert and oriented to person, place, and time.   Psychiatric:         Mood and Affect: Mood normal.         Behavior: Behavior normal.            Significant Labs:  CBC:  Recent Labs   Lab 04/26/25  0533 04/27/25  0747 04/28/25  0406   WBC 2.57* 2.32* 3.55*   RBC 3.14* 3.30* 3.68*   HGB 9.2* 9.6* 10.9*   HCT 28.4* 30.0* 32.9*   * 136* 149*   MCV 90 91 89   MCH 29.3 29.1 29.6   MCHC 32.4 32.0 33.1     BNP:  Recent Labs   Lab 04/24/25  1343   *     CMP:  Recent Labs   Lab 04/24/25  1343 04/25/25  0319 04/27/25  0747 04/27/25  2131 04/28/25  0406   CALCIUM 8.7   < > 7.5* 8.1* 8.6*   ALBUMIN 3.6  --  2.6*  --  3.2*      < > 140 141 139   K 3.3*   < > 3.6 4.7 4.5   CO2 20*   < > 17* 21* 21*      < > 109 106 104   BUN 36*   < > 33* 33* 31*   CREATININE 3.4*   < > 3.1* 2.8* 2.8*   ALKPHOS 136  --  108  --  127   ALT 29  --  377*  --  373*   AST 39  --  631*  --  599*   BILITOT 0.5  --  0.5  --  0.5    < > = values in this interval not displayed.      Coagulation:   Recent Labs   Lab 04/27/25  0747   INR 1.0   APTT 22.5     LDH:  No results for input(s): "LDH" in the last 72 hours.  Microbiology:  Microbiology Results (last 7 days)       Procedure Component Value Units Date/Time    Blood culture [[id number]]  (Normal) Collected: 04/25/25 0319    Order Status: Completed Specimen: Blood Updated: 04/27/25 1801     Blood Culture No Growth After 48 Hours    Blood culture [[id number]]  (Normal) Collected: 04/25/25 0324    Order Status: Completed Specimen: Blood Updated: 04/27/25 1801     Blood Culture No Growth After 48 Hours    Blood culture [[id number]]  (Normal) Collected: 04/27/25 0923    Order Status: Completed Specimen: Blood from Peripheral, Antecubital, Right Updated: 04/27/25 1702     Blood Culture No Growth After 6 Hours    Blood culture [[id number]]  (Normal) Collected: 04/27/25 0942    Order Status: Completed " Specimen: Blood from Peripheral, Antecubital, Left Updated: 04/27/25 1702     Blood Culture No Growth After 6 Hours    Urine culture [[id number]] Collected: 04/27/25 0845    Order Status: Sent Specimen: Urine, Catheterized Updated: 04/27/25 1428            I have reviewed all pertinent labs within the past 24 hours.    Estimated Creatinine Clearance: 16.5 mL/min (A) (based on SCr of 2.8 mg/dL (H)).    Diagnostic Results:  I have reviewed all pertinent imaging results/findings within the past 24 hours.  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   -Last PET stress 11/2024  negative for ischemia  - Both TTE and AYAAN done. Repeating TTE today post   - Will need to consider angiogram study in light of PEA arrest suspect possible CAV as cause, will need to weigh with CKD  -ASA,     S/P orthotopic heart transplant  -Transplant Date 1993 At [facility]   -CMV Status:D?R+   -Rejection status: Moderate Risk  -Last HLA/DSA 7/2/24 week DQ7  -Last 2D Echo: 4/25/25 EF: 55-60%, LVEDD: 3.7cm. RV function borderline low  -Immunosuppression: cyclosporine with goal       Cardiac arrest  -Unclear etiology but suspect possible CAV as cause   -Patient now starting to wake up and follow commands, CTH w/ no acute findings  -Remains intubated for airway protection, no airleak this AM Pulm consulted and following    CKD (chronic kidney disease), stage IV  -Baseline creatinine ~3    Essential hypertension  -Will monitor on current regimen    Anemia associated with stage 5 chronic renal failure  -H/H stable  -No signs of bleeding    Immunocompromised state due to drug therapy  -See above OHTx    Chest pain  -Multifactorial now possibly secondary to cardiac arrest    Chronic idiopathic constipation  -prn laxatives     Chronic right-sided  thoracic back pain  -continue home meds      Uninterrupted Critical Care/Counseling Time (not including procedures): 60 minutes       BARON Chao  Heart Transplant  [facility] - Cardiac [facility]

## 2025-04-28 NOTE — ASSESSMENT & PLAN NOTE
-Transplant Date 1993 At [facility]   -CMV Status:D?R+   -Rejection status: Moderate Risk  -Last HLA/DSA 7/2/24 week DQ7  -Last 2D Echo: 4/25/25 EF: 55-60%, LVEDD: 3.7cm. RV function borderline low  -Immunosuppression: cyclosporine with goal

## 2025-04-28 NOTE — EICU
Intervention Initiated From:  [facility] / EICU    Diana intervened regarding:  Rounding (Video assessment)    Comments: Virtual ICU Quality Rounds    Admit Date: 4/24/2025  Hospital Day: 3    ICU Day: 1d 4h    24H Vital Sign Range:  Temp:  [96.3 °F (35.7 °C)-100.2 °F (37.9 °C)]   Pulse:  [68-78]   Resp:  [9-47]   BP: ()/()   SpO2:  [96 %-100 %]     VICU Surveillance Screening    Daily review of  line necessity(optional): Urinary catheter in place    Swain Indications : Critically ill in the intensive care unit requiring hourly monitoring

## 2025-04-29 PROBLEM — R11.0 CHRONIC NAUSEA: Status: RESOLVED | Noted: 2025-04-25 | Resolved: 2025-04-29

## 2025-04-29 LAB
ABSOLUTE EOSINOPHIL (OHS): 0.06 K/UL
ABSOLUTE MONOCYTE (OHS): 0.42 K/UL (ref 0.3–1)
ABSOLUTE NEUTROPHIL COUNT (OHS): 2.55 K/UL (ref 1.8–7.7)
ALBUMIN SERPL BCP-MCNC: 3.1 G/DL (ref 3.5–5.2)
ALP SERPL-CCNC: 130 UNIT/L (ref 40–150)
ALT SERPL W/O P-5'-P-CCNC: 331 UNIT/L (ref 10–44)
ANION GAP (OHS): 16 MMOL/L (ref 8–16)
AST SERPL-CCNC: 410 UNIT/L (ref 11–45)
BASOPHILS # BLD AUTO: 0.01 K/UL
BASOPHILS NFR BLD AUTO: 0.3 %
BILIRUB SERPL-MCNC: 0.5 MG/DL (ref 0.1–1)
BUN SERPL-MCNC: 36 MG/DL (ref 8–23)
CALCIUM SERPL-MCNC: 8.7 MG/DL (ref 8.7–10.5)
CHLORIDE SERPL-SCNC: 107 MMOL/L (ref 95–110)
CO2 SERPL-SCNC: 20 MMOL/L (ref 23–29)
CREAT SERPL-MCNC: 2.6 MG/DL (ref 0.5–1.4)
CYCLOSPORINE BLD LC/MS/MS-MCNC: 128 NG/ML (ref 100–400)
ERYTHROCYTE [DISTWIDTH] IN BLOOD BY AUTOMATED COUNT: 15.8 % (ref 11.5–14.5)
GFR SERPLBLD CREATININE-BSD FMLA CKD-EPI: 19 ML/MIN/1.73/M2
GLUCOSE SERPL-MCNC: 104 MG/DL (ref 70–110)
HCT VFR BLD AUTO: 33.3 % (ref 37–48.5)
HGB BLD-MCNC: 10.5 GM/DL (ref 12–16)
IMM GRANULOCYTES # BLD AUTO: 0.01 K/UL (ref 0–0.04)
IMM GRANULOCYTES NFR BLD AUTO: 0.3 % (ref 0–0.5)
LYMPHOCYTES # BLD AUTO: 0.9 K/UL (ref 1–4.8)
MAGNESIUM SERPL-MCNC: 2.5 MG/DL (ref 1.6–2.6)
MCH RBC QN AUTO: 28.8 PG (ref 27–31)
MCHC RBC AUTO-ENTMCNC: 31.5 G/DL (ref 32–36)
MCV RBC AUTO: 92 FL (ref 82–98)
NUCLEATED RBC (/100WBC) (OHS): 0 /100 WBC
PHOSPHATE SERPL-MCNC: 3.4 MG/DL (ref 2.7–4.5)
PLATELET # BLD AUTO: 170 K/UL (ref 150–450)
PMV BLD AUTO: 10.6 FL (ref 9.2–12.9)
POTASSIUM SERPL-SCNC: 4.4 MMOL/L (ref 3.5–5.1)
PROT SERPL-MCNC: 8.2 GM/DL (ref 6–8.4)
RBC # BLD AUTO: 3.64 M/UL (ref 4–5.4)
RELATIVE EOSINOPHIL (OHS): 1.5 %
RELATIVE LYMPHOCYTE (OHS): 22.8 % (ref 18–48)
RELATIVE MONOCYTE (OHS): 10.6 % (ref 4–15)
RELATIVE NEUTROPHIL (OHS): 64.5 % (ref 38–73)
SODIUM SERPL-SCNC: 143 MMOL/L (ref 136–145)
WBC # BLD AUTO: 3.95 K/UL (ref 3.9–12.7)

## 2025-04-29 PROCEDURE — 25000242 PHARM REV CODE 250 ALT 637 W/ HCPCS: Mod: HCNC | Performed by: STUDENT IN AN ORGANIZED HEALTH CARE EDUCATION/TRAINING PROGRAM

## 2025-04-29 PROCEDURE — 63600175 PHARM REV CODE 636 W HCPCS: Mod: HCNC | Performed by: NURSE PRACTITIONER

## 2025-04-29 PROCEDURE — 99900035 HC TECH TIME PER 15 MIN (STAT): Mod: HCNC

## 2025-04-29 PROCEDURE — 83735 ASSAY OF MAGNESIUM: CPT | Mod: HCNC

## 2025-04-29 PROCEDURE — 20000000 HC ICU ROOM: Mod: HCNC

## 2025-04-29 PROCEDURE — 84100 ASSAY OF PHOSPHORUS: CPT | Mod: HCNC

## 2025-04-29 PROCEDURE — 63600175 PHARM REV CODE 636 W HCPCS: Mod: HCNC | Performed by: STUDENT IN AN ORGANIZED HEALTH CARE EDUCATION/TRAINING PROGRAM

## 2025-04-29 PROCEDURE — 63600175 PHARM REV CODE 636 W HCPCS: Mod: HCNC

## 2025-04-29 PROCEDURE — 63600175 PHARM REV CODE 636 W HCPCS: Mod: HCNC | Performed by: PHYSICIAN ASSISTANT

## 2025-04-29 PROCEDURE — 80053 COMPREHEN METABOLIC PANEL: CPT | Mod: HCNC

## 2025-04-29 PROCEDURE — 99291 CRITICAL CARE FIRST HOUR: CPT | Mod: HCNC,,, | Performed by: PHYSICIAN ASSISTANT

## 2025-04-29 PROCEDURE — 27100171 HC OXYGEN HIGH FLOW UP TO 24 HOURS: Mod: HCNC

## 2025-04-29 PROCEDURE — 94761 N-INVAS EAR/PLS OXIMETRY MLT: CPT | Mod: HCNC

## 2025-04-29 PROCEDURE — 85025 COMPLETE CBC W/AUTO DIFF WBC: CPT | Mod: HCNC

## 2025-04-29 PROCEDURE — 63600175 PHARM REV CODE 636 W HCPCS: Mod: JZ,TB,HCNC | Performed by: STUDENT IN AN ORGANIZED HEALTH CARE EDUCATION/TRAINING PROGRAM

## 2025-04-29 PROCEDURE — 25000242 PHARM REV CODE 250 ALT 637 W/ HCPCS: Mod: HCNC | Performed by: PHYSICIAN ASSISTANT

## 2025-04-29 PROCEDURE — 25000003 PHARM REV CODE 250: Mod: HCNC

## 2025-04-29 PROCEDURE — 25000003 PHARM REV CODE 250: Mod: HCNC | Performed by: PHYSICIAN ASSISTANT

## 2025-04-29 PROCEDURE — 25000003 PHARM REV CODE 250: Mod: HCNC | Performed by: STUDENT IN AN ORGANIZED HEALTH CARE EDUCATION/TRAINING PROGRAM

## 2025-04-29 PROCEDURE — 94003 VENT MGMT INPAT SUBQ DAY: CPT | Mod: HCNC

## 2025-04-29 PROCEDURE — 99233 SBSQ HOSP IP/OBS HIGH 50: CPT | Mod: HCNC,GC,, | Performed by: EMERGENCY MEDICINE

## 2025-04-29 PROCEDURE — 80158 DRUG ASSAY CYCLOSPORINE: CPT | Mod: HCNC | Performed by: INTERNAL MEDICINE

## 2025-04-29 PROCEDURE — 99292 CRITICAL CARE ADDL 30 MIN: CPT | Mod: HCNC,,, | Performed by: INTERNAL MEDICINE

## 2025-04-29 PROCEDURE — 99900026 HC AIRWAY MAINTENANCE (STAT): Mod: HCNC

## 2025-04-29 RX ORDER — PANTOPRAZOLE SODIUM 40 MG/10ML
40 INJECTION, POWDER, LYOPHILIZED, FOR SOLUTION INTRAVENOUS DAILY
Status: DISCONTINUED | OUTPATIENT
Start: 2025-04-29 | End: 2025-04-30

## 2025-04-29 RX ORDER — MORPHINE SULFATE 2 MG/ML
2 INJECTION, SOLUTION INTRAMUSCULAR; INTRAVENOUS EVERY 4 HOURS PRN
Status: DISCONTINUED | OUTPATIENT
Start: 2025-04-29 | End: 2025-05-06 | Stop reason: HOSPADM

## 2025-04-29 RX ORDER — NAPROXEN SODIUM 220 MG/1
81 TABLET, FILM COATED ORAL DAILY
Status: DISCONTINUED | OUTPATIENT
Start: 2025-04-29 | End: 2025-05-06 | Stop reason: HOSPADM

## 2025-04-29 RX ORDER — CYCLOSPORINE 100 MG/ML
75 SOLUTION ORAL 2 TIMES DAILY
Status: DISCONTINUED | OUTPATIENT
Start: 2025-04-30 | End: 2025-04-29

## 2025-04-29 RX ORDER — HYDRALAZINE HYDROCHLORIDE 20 MG/ML
25 INJECTION INTRAMUSCULAR; INTRAVENOUS ONCE
Status: COMPLETED | OUTPATIENT
Start: 2025-04-29 | End: 2025-04-29

## 2025-04-29 RX ORDER — CYCLOSPORINE 100 MG/ML
75 SOLUTION ORAL 2 TIMES DAILY
Status: DISCONTINUED | OUTPATIENT
Start: 2025-04-29 | End: 2025-04-30

## 2025-04-29 RX ADMIN — NITROGLYCERIN 0.4 MG: 0.4 TABLET, ORALLY DISINTEGRATING SUBLINGUAL at 11:04

## 2025-04-29 RX ADMIN — NITROGLYCERIN 0.4 MG: 0.4 TABLET, ORALLY DISINTEGRATING SUBLINGUAL at 03:04

## 2025-04-29 RX ADMIN — TIZANIDINE 4 MG: 4 TABLET ORAL at 09:04

## 2025-04-29 RX ADMIN — OXYCODONE 5 MG: 5 TABLET ORAL at 07:04

## 2025-04-29 RX ADMIN — NITROGLYCERIN 0.4 MG: 0.4 TABLET, ORALLY DISINTEGRATING SUBLINGUAL at 02:04

## 2025-04-29 RX ADMIN — ALUMINUM HYDROXIDE, MAGNESIUM HYDROXIDE, AND SIMETHICONE 30 ML: 200; 200; 20 SUSPENSION ORAL at 09:04

## 2025-04-29 RX ADMIN — MORPHINE SULFATE 2 MG: 2 INJECTION, SOLUTION INTRAMUSCULAR; INTRAVENOUS at 09:04

## 2025-04-29 RX ADMIN — ASPIRIN 81 MG CHEWABLE TABLET 81 MG: 81 TABLET CHEWABLE at 11:04

## 2025-04-29 RX ADMIN — CYCLOSPORINE 75 MG: 100 SOLUTION ORAL at 10:04

## 2025-04-29 RX ADMIN — PANTOPRAZOLE SODIUM 40 MG: 40 INJECTION, POWDER, FOR SOLUTION INTRAVENOUS at 11:04

## 2025-04-29 RX ADMIN — CARVEDILOL 3.12 MG: 3.12 TABLET, FILM COATED ORAL at 09:04

## 2025-04-29 RX ADMIN — ZOLPIDEM TARTRATE 5 MG: 5 TABLET, FILM COATED ORAL at 10:04

## 2025-04-29 RX ADMIN — CARVEDILOL 3.12 MG: 3.12 TABLET, FILM COATED ORAL at 05:04

## 2025-04-29 RX ADMIN — CEFTRIAXONE 1 G: 1 INJECTION, POWDER, FOR SOLUTION INTRAMUSCULAR; INTRAVENOUS at 03:04

## 2025-04-29 RX ADMIN — METHYLPREDNISOLONE SODIUM SUCCINATE 40 MG: 40 INJECTION, POWDER, FOR SOLUTION INTRAMUSCULAR; INTRAVENOUS at 03:04

## 2025-04-29 RX ADMIN — CYCLOSPORINE 75 MG: 100 SOLUTION ORAL at 09:04

## 2025-04-29 RX ADMIN — FENTANYL CITRATE 25 MCG: 50 INJECTION INTRAMUSCULAR; INTRAVENOUS at 05:04

## 2025-04-29 RX ADMIN — ENOXAPARIN SODIUM 30 MG: 30 INJECTION SUBCUTANEOUS at 05:04

## 2025-04-29 RX ADMIN — SERTRALINE HYDROCHLORIDE 25 MG: 25 TABLET ORAL at 09:04

## 2025-04-29 RX ADMIN — CALCITRIOL CAPSULES 0.25 MCG 0.25 MCG: 0.25 CAPSULE ORAL at 09:04

## 2025-04-29 RX ADMIN — DEXMEDETOMIDINE HYDROCHLORIDE 1.4 MCG/KG/HR: 4 INJECTION INTRAVENOUS at 05:04

## 2025-04-29 RX ADMIN — FENTANYL CITRATE 25 MCG: 50 INJECTION INTRAMUSCULAR; INTRAVENOUS at 01:04

## 2025-04-29 RX ADMIN — PREGABALIN 50 MG: 50 CAPSULE ORAL at 09:04

## 2025-04-29 RX ADMIN — DEXMEDETOMIDINE HYDROCHLORIDE 1.3 MCG/KG/HR: 4 INJECTION INTRAVENOUS at 01:04

## 2025-04-29 RX ADMIN — HYDRALAZINE HYDROCHLORIDE 25 MG: 20 INJECTION, SOLUTION INTRAMUSCULAR; INTRAVENOUS at 04:04

## 2025-04-29 RX ADMIN — MORPHINE SULFATE 2 MG: 2 INJECTION, SOLUTION INTRAMUSCULAR; INTRAVENOUS at 03:04

## 2025-04-29 NOTE — NURSING
Rounds Report: Attended interdisciplinary rounds this afternoon with the transplant team including SW, physicians, fellows,  mid-level providers, and transplant coordinators. Discussed plan of care, including DC date, current medications and current plan.

## 2025-04-29 NOTE — CONSULTS
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        Inpatient consult to Palliative Care  Consult performed by: Ness Hernandez, CNS  Consult ordered by: Chelsea Claros PA             Assessment/Plan:      Palliative Care Encounter:  Impression:    Ms. Nadia Damon is a 70 y/o female with hx of heart tx (5/1993), left breat ca with chemo and lumpectomy (9/2021) and radiation (2022), CKD stg V, angina, ventral hernia, chronic back pain associated with cervical spine DJD,  HTN, fibromyalgia, thrombocytopenia.        She was scheduled for outpatient radiofrequency ablation (4/24) with pain management in , when she began to have nausea and CP and was found with abnormal EKG. She was transferred to [facility] on 4[place]24. Workup for aortic dissection negative.     Pt with PEA cardiac arrest on 4/27. 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 However angio/further imaging contraindicated due to poor renal function.     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        Advance Care Planning   Advance Directives:   Living Will: Yes        Copy on chart: Yes    Do Not Resuscitate Status: Yes    Medical Power of : Yes    Agent's Name:  Moy Watkins (son)   Agent's Contact Number:  [phone]    Decision Making:  Patient answered questions and Family answered questions  Goals of Care: What is most important right now is to focus on remaining as  independent as possible, symptom/pain control, improvement in condition but with limits to invasive therapies. Accordingly, we have decided that the best plan to meet the patient's goals includes continuing with treatment.        4/30/25:  -Visited with pt today at . She is sitting in chair, denies any acute pain or distress, and is with 2 friends visiting. Pt shares they are her long time friends and is agreeable to me speaking with them in room. I assured pt that I will call her son to introduce conversation, as well as revisit so that she and I may speak with son together.   -Pt is followed by [facility] in . Introduced role of [facility] at this time to assist with GOC.   -Pt shares she is interested in SNF (if possible) after hospital d/c, so that she may get stronger and continue to live independently. She shares she relies on her friends to help her around home and assist with medications, etc if needed. She did not want to further discuss hospice at this time.   -I shared that she may find herself weaker than prior to hospital admission, as well as concerns related to kidney function, therefore needing extra assistance.   -Pt shares her conversation with primary team this am of DNR and continued wish to not have dialysis, which is reasonable at this time. She is agreeable to continued conversation with son and myself      -Called pt's son and dicussed above conversations.  -We dicussed possibility of pt going to SNF (if accepted), which may give pt/family some time to organize next step after SNF. -We discussed possibility that attempted rehab at SNF may be beneficial for pt to see how she truly feels post hospitalization with CP, kidney dysfunction, etc.   -We dicussed possibility of transition to hospice when pt ready, educating that hospice services can be provided at NH or pt/family home.   -I discussed philosophy of hospice and goals of care associated with hospice services including: avoidance of  "hospital, comfort through end of life outside of hospital, symptom management, and QOL.   -Given pt/family discussion with primary team for DNR, I  discussed utility of LAPOST with son, in event of d/c to SNF.   -LAPOST completed. Awaiting remote signature of son and MD. Will follow up.         Symptom Management:  -Pain: Pt with chronic back pain. Follows with pain service. Home meds: percocet 5-325mg q4 prn pain.       -Anxiety: per psychiatry notes, pt with OCPD and recently started on Sertraline 25mg;  previous duloxetine d/c because of tenuous renal status.       Recommendations and follow up plan:  -Most important goals at this time: Continued dispo planning.    -Code status: DNR  -Disposition: To be determined.       The above recommendations communicated directly to primary team on 4/30/25    Thank you for your consult. I will follow-up with patient. Please contact us if you have any additional questions.       Subjective:     Chief Complaint: No chief complaint on file.        HPI:   Per chart review: "Ms. Damon is a 70 y/o AAF s/p OHTx 5/27/93 at [facility], s/p PPM same date, mild anemia and leukopenia, OA, cervical spine DJD with radiculopathy and chronic neck pain who presents for her 31st post annual transplant evaluation. Four years ago after her annual was found to have BRCA, underwent excision, chemo/radiation, had a rough time of it, with some complications related to it, however recovered well.      Transferred from [facility] ED due to chest pain. She initially was at the [facility] for pain management procedure for chronic back pain. In preprocedure area complained of nausea and chest pain having taken a sublingual nitro prior to arrival. Reported intermittent chest pain > 1 week. Took all regular medications on an empty stomach and per notes had n/v after starting IV. ECG with repol abnormalities. Noted to be hypokalemic and hypomagnesemic. Troponin I mildly elevated.      Off note, a few months " "ago was evaluated by [facility] for a reducible ventral hernia containing portion of the liver no bowel noted on exam or imaging. At that time also reported chronic nausea. Elective hernia repair deemed not emergent and if she should require intervention to be done at center with cardiac surgery and Cardiology heart failure services."      Hospital Course:  Per chart review: " 4/26:  No overnight events but still complaining of Chest pain spreading to should blades. Being moved to ICU to get AYAAN to r/o aortic dissection (unable to get CTA due to CKD).   4/27:  This AM around 700 patient had PEA arrest reportedly shortly after using the restroom and returning to bed. ROSC achieved after round of CPR w/ 1 dose of epi. Post cardiac arrest she was intubated for airway protection. Was very slow to wake up (unresponsive for 2-3 hours post sedation stopping) but around 1130 woke up and started following all commands.  4/28: Patient remains intubated. EEG in process; patient awake and following commands and responding appropriately. She is complaining of chest pain with inspiration; likely from CPR. Pulm contact for extubation. She has been doing well on SBT all morning.   4/29: Pt extubated.She was given corticosteroids overnight and extubated this morning. No new complaints. EEG unremarkable. We started Lovenox for DVT prophylaxis. "     Review of Symptoms      Symptom Assessment (ESAS 0-10 Scale)  Pain:  5  Dyspnea:  0  Anxiety:  4  Nausea:  0  Depression:  0  Anorexia:  0  Fatigue:  0  Insomnia:  0  Restlessness:  0  Agitation:  0         Pain Assessment:    Location(s): back    Back       Location: generalized        Quality: Aching        Quantity: 5/10 in intensity        Chronicity: Onset 2 year(s) ago, stable        Aggravating Factors: None        Alleviating Factors: Opiates       Associated Symptoms: None    Performance Status:  70    Living Arrangements:  Lives alone    Psychosocial/Cultural:   See Palliative " Psychosocial Note: Yes  **Primary  to Follow**  Palliative Care  Consult: No    Spiritual:  F - Angela and Belief:  Hindu           ROS:  Review of Systems   Cardiovascular:  Positive for chest pain.   Musculoskeletal:  Positive for myalgias.         Past Medical History:   Diagnosis Date    Abdominal wall hernia     CT Renal 6/11/2018---Small fat containing superior ventral abdominal wall hernia at the epicardial pacing lead site.    Abnormal mammogram 10/12/2021    Acute cystitis without hematuria 05/10/2022    Acute ischemic right middle cerebral artery (MCA) stroke 07/20/2024    Adverse drug reaction 11/12/2024    GRACE (acute kidney injury) 11/22/2021    Anxiety     Aphasia 07/19/2024    Arthritis     ZEN HIPS    Breast cancer in female 08/2021    LEFT BREAST    Breast mass, right 11/13/2024    RIGHT BREAST MASS (Problem)      Bronchitis 08/18/2016    Never smoked      C. difficile colitis 11/29/2021    Cellulitis of axilla, left 12/23/2021    Chronic diastolic heart failure 12/16/2021    Chronic kidney disease     stage 4, GFR 15-29 ml/min    Chronic midline low back pain without sciatica 06/18/2018    CKD (chronic kidney disease) stage 4, GFR 15-29 ml/min 04/20/2016    US Retro   5/16/2018---Mild cortical thinning and increased cortical echogenicity.  Findings can be seen with medical renal disease.  8/31/216--- Echogenic kidneys with diffuse cortical thinning suggesting  medical renal disease. 2 complex right renal cortical cysts.      Closed nondisplaced fracture of distal phalanx of left great toe with routine healing 10/22/2018    Coronary artery disease 1993    heart transplant    COVID-19 in immunocompromised patient 02/26/2024    Cystitis 05/10/2022    Depression     Encounter for blood transfusion     Encounter for palliative care 10/14/2024    Fibromyalgia     on Lyrica    Heart failure     native heart cardiomyopathy    Heart transplanted 1993    due to cardiomyopathy     History of hyperparathyroidism; Hyperparathyroidism, secondary renal     PT DENIES    Hypertension     Immune disorder     anti rejection meds    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    Impaired mobility 07/28/2022    Iron deficiency anemia 08/15/2017    Kidney stones     passed per pt    Obesity     Obesity (BMI 30.0-34.9) 07/22/2019    Other osteoporosis without current pathological fracture 08/30/2019    Other pancytopenia 05/06/2024    Parotitis, acute 09/19/2023    Pharyngitis 01/09/2019    Pneumonia due to infectious organism 03/14/2024    PONV (postoperative nausea and vomiting)     Severe sepsis 11/22/2021    Shingles 2003 approx    left leg    Stage 4 chronic kidney disease 11/22/2021    Subclinical hypothyroidism 06/16/2023    Thrombocytopenia, unspecified 11/29/2021    Trouble in sleeping     Unresponsiveness 11/13/2024    Urinary incontinence      Past Surgical History:   Procedure Laterality Date    bladder implant Right 06/11/2024    BLADDER SURGERY  2015 approx    mesh - Dr Everett then 2nd reconstructive sx Dr Onofre    BREAST BIOPSY Bilateral     NEGATIVE    BREAST BIOPSY Right 10/31/2022    benign    BREAST LUMPECTOMY Left 2021    BREAST SURGERY Left 09/28/2015    Bx - benign    BREAST SURGERY Right 12/2015    Bx benign    CARDIAC PACEMAKER REMOVAL Left 06/26/2014    Pacer defirillator removed. Put in 1993 aat time of heart transplant    CARPAL TUNNEL RELEASE Left 03/03/2015    Dr. Hall    COLONOSCOPY N/A 02/25/2021    Procedure: COLONOSCOPY;  Surgeon: Freida Ramirez MD;  Location: [facility];  Service: Endoscopy;  Laterality: N/A;    CYSTOCELE REPAIR      Twice with mesh removal    EPIDURAL STEROID INJECTION INTO CERVICAL SPINE N/A 02/02/2023    Procedure: T11/T12 IL HELLEN;  Surgeon: Jassi Pierre MD;  Location: [facility] PAIN T;  Service: Pain Management;  Laterality: N/A;    EPIDURAL STEROID INJECTION INTO CERVICAL SPINE N/A 9/16/2024    Procedure: T11/T12 IL HELLEN;   Surgeon: Jassi Pierre MD;  Location: [facility] PAIN MGT;  Service: Pain Management;  Laterality: N/A;    HEART TRANSPLANT  1993    HERNIA REPAIR Right 1971 approx    Inguinal    HYSTERECTOMY  1983    vag hyst /LSO     IMPLANTATION OF PERMANENT SACRAL NERVE STIMULATOR N/A 06/11/2024    Procedure: INSERTION, NEUROSTIMULATOR, PERMANENT, SACRAL;  Surgeon: Sami Cochran MD;  Location: [facility] OR;  Service: Urology;  Laterality: N/A;    INCISION AND DRAINAGE OF ABSCESS Left 12/24/2021    Procedure: INCISION AND DRAINAGE, ABSCESS;  Surgeon: Joseph Longo MD;  Location: [facility] OR;  Service: General;  Laterality: Left;    INJECTION OF ANESTHETIC AGENT AROUND MEDIAL BRANCH NERVES INNERVATING LUMBAR FACET JOINT Right 10/19/2022    Procedure: Right L4/L5 and L5/S1 MBB;  Surgeon: Jassi Pierre MD;  Location: [facility] PAIN MGT;  Service: Pain Management;  Laterality: Right;    INJECTION OF ANESTHETIC AGENT AROUND MEDIAL BRANCH NERVES INNERVATING LUMBAR FACET JOINT Right 11/09/2022    Procedure: Right L4/L5 and L5/S1 MBB;  Surgeon: Jassi Pierre MD;  Location: [facility] PAIN MGT;  Service: Pain Management;  Laterality: Right;    INJECTION OF ANESTHETIC AGENT AROUND MEDIAL BRANCH NERVES INNERVATING LUMBAR FACET JOINT Left 2/6/2025    Procedure: Left L4-5 and L5-S1 MBB #1 with local;  Surgeon: Jassi Pierre MD;  Location: [facility] PAIN MGT;  Service: Pain Management;  Laterality: Left;    INJECTION OF ANESTHETIC AGENT AROUND MEDIAL BRANCH NERVES INNERVATING LUMBAR FACET JOINT Left 3/19/2025    Procedure: Left L4-5 and L5-S1 MBB #2 with local;  Surgeon: Jassi Pierre MD;  Location: [facility] PAIN MGT;  Service: Pain Management;  Laterality: Left;    INJECTION OF ANESTHETIC AGENT INTO SACROILIAC JOINT Right 08/22/2022    Procedure: Right SIJ Injection Right L5/S1 Facte Injection;  Surgeon: Jassi Pierre MD;  Location: [facility] PAIN MGT;  Service: Pain Management;  Laterality: Right;    INSERTION OF TUNNELED CENTRAL VENOUS  CATHETER (CVC) WITH SUBCUTANEOUS PORT N/A 11/09/2021    Procedure: [id number];  Surgeon: Christoph Douglas MD;  Location: [facility] OR;  Service: General;  Laterality: N/A;    RADIOFREQUENCY ABLATION Right 12/5/2024    Procedure: Right L4-5 and L5-S1 RFA;  Surgeon: Jassi Pierre MD;  Location: [facility] PAIN MGT;  Service: Pain Management;  Laterality: Right;    RADIOFREQUENCY THERMOCOAGULATION Right 12/07/2022    Procedure: Right L4/L5 and L5/S1 Lumbar RFA;  Surgeon: Jassi Pierre MD;  Location: [facility] PAIN MGT;  Service: Pain Management;  Laterality: Right;    REMOVAL OF ELECTRODE LEAD OF SACRAL NERVE STIMULATOR N/A 2/18/2025    Procedure: REMOVAL, ELECTRODE LEAD, SACRAL NERVE STIMULATOR;  Surgeon: Sami Cochran MD;  Location: [facility] OR;  Service: Urology;  Laterality: N/A;    REMOVAL OF VASCULAR ACCESS PORT      ROBOT-ASSISTED LAPAROSCOPIC ABDOMINAL SACROCOLPOPEXY N/A 08/10/2023    Procedure: ROBOTIC SACROCOLPOPEXY, ABDOMEN;  Surgeon: PRANAY Villalobos MD;  Location: [facility] OR;  Service: OB/GYN;  Laterality: N/A;    ROBOT-ASSISTED LAPAROSCOPIC OOPHORECTOMY Right 08/10/2023    Procedure: ROBOTIC OOPHORECTOMY;  Surgeon: PRANAY Villalobos MD;  Location: [facility] OR;  Service: OB/GYN;  Laterality: Right;    SENTINEL LYMPH NODE BIOPSY Left 10/12/2021    Procedure: BIOPSY, LYMPH NODE, SENTINEL;  Surgeon: Christoph Douglas MD;  Location: [facility] OR;  Service: General;  Laterality: Left;    TOE SURGERY      XI ROBOTIC URETHROPEXY N/A 08/10/2023    Procedure: XI ROBOTIC URETHROPEXY;  Surgeon: PRANAY Villalobos MD;  Location: [facility] OR;  Service: OB/GYN;  Laterality: N/A;     Family History   Problem Relation Name Age of Onset    Cancer Mother  38        breast    Breast cancer Mother      Breast cancer Maternal Grandmother      Heart disease Maternal Grandmother      Hypertension Son      Cataracts Cousin      Diabetes Neg Hx      Stroke Neg Hx      Kidney disease Neg Hx      Asthma Neg Hx      COPD Neg Hx       "Melanoma Neg Hx      Hyperlipidemia Neg Hx           Review of patient's allergies indicates:   Allergen Reactions    Dobutamine Other (See Comments)     Severe Left sided chest pain after dosage administered for Dobutamine Stress Test; itching    Lisinopril Swelling and Rash    Hydrocodone-acetaminophen Nausea Only    Augmentin [amoxicillin-pot clavulanate] Diarrhea    Zyvox [linezolid] Nausea And Vomiting       Medications:  Current Medications[1]         Objective:      Physical Exam:  Vitals: Temp: 96.8 °F (36 °C) (04/29/25 1200)  Pulse: 71 (04/29/25 1200)  Resp: 20 (04/29/25 1324)  BP: 137/85 (04/29/25 1200)  SpO2: 100 % (04/29/25 1200)    Physical Exam  Neurological:      Mental Status: She is alert and oriented to person, place, and time.                 Labs:   Creatinine   Date Value Ref Range Status   04/29/2025 2.6 (H) 0.5 - 1.4 mg/dL Final      Hemoglobin   Date Value Ref Range Status   02/24/2025 9.1 (L) 12.0 - 16.0 g/dL Final   02/24/2025 9.1 (L) 12.0 - 16.0 g/dL Final     HGB   Date Value Ref Range Status   04/29/2025 10.5 (L) 12.0 - 16.0 gm/dL Final      Albumin   Date Value Ref Range Status   04/29/2025 3.1 (L) 3.5 - 5.2 g/dL Final   04/28/2025 3.2 (L) 3.5 - 5.2 g/dL Final   04/27/2025 2.6 (L) 3.5 - 5.2 g/dL Final   02/24/2025 3.3 (L) 3.5 - 5.2 g/dL Final   02/24/2025 3.3 (L) 3.5 - 5.2 g/dL Final   02/07/2025 3.3 (L) 3.5 - 5.2 g/dL Final          Imaging: Transthoracic echo (TTE) complete    Height: 5' 2" (1.575 m)   Weight: 69 kg (152 lb 1.9 oz)   Blood Pressure: 135/85    Date of Study: 4/28/25   Ordering Provider: Kishore, Huber A., PA-C    Clinical Indications: S/P orthotopic heart transplant [Z94.1 (ICD-10-CM)]       Reading Physicians  Performing Staff   Cardiology: Darline Mosqueda MD     Tech: Marcel Chna         Reason for Exam  Priority: STAT  Dx: S/P orthotopic heart transplant [Z94.1 (ICD-10-CM)]     View Images Vital Vitrea     Show images for Echo  Summary  Show Result Comparison     " "S/P heart transplant 1993.    Left Ventricle: The left ventricle is normal in size. Normal wall thickness. There is concentric remodeling. There is mildly reduced systolic function. Ejection fraction is approximately 45%. Mild global hypokinesis present. Contractility is better preserved in the basal segements. There is normal diastolic function.    Right Ventricle: The right ventricle is normal in size. Systolic function is moderately reduced. TAPSE is 0.9 cm.    Tricuspid Valve: There is mild to moderate regurgitation.    Pulmonary Artery: The estimated pulmonary artery systolic pressure is 28 mmHg.    IVC/SVC: Normal venous pressure at 3 mmHg.        Vitals    Height Weight BMI (Calculated) BSA (Calculated - sq m) BP Pulse   5' 2" (1.575 m) 69 kg (152 lb 1.9 oz) 27.8 1.74 sq meters 135/85 74     Study Details A complete echo was performed using complete 2D, color flow Doppler and spectral Doppler. During the study, the apical, parasternal and subcostal views were captured.  Overall the study quality was adequate. This was a portable study performed at the patient's bedside. The patient is status post cardiac transplantation. Atrial measurements are not reported.     Echocardiography Findings    Left Ventricle The left ventricle is normal in size. Normal wall thickness. There is concentric remodeling. Mild global hypokinesis present. Contractility is better preserved in the basal segements. There is mildly reduced systolic function. Ejection fraction is approximately 45%. There is normal diastolic function.   Right Ventricle The right ventricle is normal in size. Systolic function is moderately reduced. TAPSE is 0.9 cm.   Left Atrium Normal left atrial size. The patient is s/p heart transplant. Atrial dimensions are not reported. .   Right Atrium Normal right atrial size. The patient is s/p heart transplant. Atrial dimensions are not reported. .   Aortic Valve The aortic valve is structurally normal. There is " normal leaflet mobility. Aortic valve peak velocity is 0.8 m/s. Mean gradient is 1 mmHg. There is no significant regurgitation.   Mitral Valve The mitral valve is structurally normal. There is normal leaflet mobility. There is no significant regurgitation.   Tricuspid Valve The tricuspid valve is structurally normal. There is normal leaflet mobility. There is mild to moderate regurgitation.   Pulmonic Valve Not well visualized due to poor acoustic window.   IVC/SVC Normal venous pressure at 3 mmHg.   Ascending Aorta The aortic root is normal in size measuring 3.11 cm. The ascending aorta is normal in size measuring 2.4 cm.   Pericardium and Other Findings There is no pericardial effusion.   Pulmonary Artery The estimated pulmonary artery systolic pressure is 28 mmHg.     Exam Details    Performed Procedure Technologist     Transthoracic echo (TTE) Marcel Padilla            Begin Exam End Exam     4/28/2025 11:05 AM CDT 4/28/2025 11:39 AM CDT              2D Measurements    Dimensions   LVIDd 4 cm  (Range: 3.5 - 6.0)         LVIDs 3.3 cm  (Range: 2.1 - 4.0)         IVS 0.9 cm  (Range: 0.6 - 1.1)         PW 0.9 cm  (Range: 0.6 - 1.1)         FS 17.5 %  (Range: 28 - 44) Abnormal          LV mass 109.7 g         EF 45 %         Echo EF Estimated 40 %          Dimensions   TAPSE 0.9 cm          Aortic Root - End Diastolic   Sinus 3.11 cm         STJ 3.1 cm         Ascending aorta 2.4 cm          Inferior Vena Cava   Est. RA pres 3 mmHg                  Additional 20 min time spent on a voluntary advance care planning and /or goals of care discussion, providing emotional support, formulating, and communicating prognosis and exploring burden/benefit of various approaches of treatment.       Thank you for the opportunity to care for this patient and family.       Ness Hernandez, CNS         [1]   Current Facility-Administered Medications:     acetaminophen tablet 650 mg, 650 mg, Oral, Q4H PRN, Margo Anand,  MD Marshall, 650 mg at 04/28/25 2059    aluminum-magnesium hydroxide-simethicone 200-200-20 mg/5 mL suspension 30 mL, 30 mL, Oral, Q6H PRN, [place] Bassett MD, 30 mL at 04/27/25 1040    aluminum-magnesium hydroxide-simethicone 200-200-20 mg/5 mL suspension 30 mL, 30 mL, Oral, QID (AC & HS), [place] Bassett MD, 30 mL at 04/28/25 2004    aspirin chewable tablet 81 mg, 81 mg, Oral, Daily, Chelsea Claros PA, 81 mg at 04/29/25 1147    calcitRIOL capsule 0.25 mcg, 0.25 mcg, Oral, Daily, [place] Bassett MD, 0.25 mcg at 04/29/25 0906    carvediloL tablet 3.125 mg, 3.125 mg, Oral, BID WM, [place] Bassett MD, 3.125 mg at 04/29/25 0901    cefTRIAXone injection 1 g, 1 g, Intravenous, Q24H, Mohinder Barrow MD, 1 g at 04/29/25 0300    cycloSPORINE (NEORAL) 100 mg/mL microemulsion solution 75 mg, 75 mg, Per NG tube, BID, Mohinder Barrow MD, 75 mg at 04/29/25 0913    dexmedetomidine (PRECEDEX) 400mcg/100mL 0.9% NaCL infusion, 0-1.4 mcg/kg/hr, Intravenous, Continuous, Keri Dyson MD, Stopped at 04/29/25 0949    enoxaparin injection 30 mg, 30 mg, Subcutaneous, Q24H (prophylaxis, 1700), Chelsea Claros PA, 30 mg at 04/28/25 1712    fentaNYL 50 mcg/mL injection 25 mcg, 25 mcg, Intravenous, Q4H PRN, Mohinder Barrow MD, 25 mcg at 04/29/25 1324    niCARdipine 40 mg/200 mL (0.2 mg/mL) infusion, 0-15 mg/hr, Intravenous, Continuous, Keri Dyson MD, Stopped at 04/28/25 2307    NIFEdipine 24 hr tablet 60 mg, 60 mg, Oral, Daily, [place] Bassett MD, 60 mg at 04/25/25 0820    nitroGLYCERIN SL tablet 0.4 mg, 0.4 mg, Sublingual, Q5 Min PRN, [place] Bassett MD, 0.4 mg at 04/25/25 0317    nitroGLYCERIN SL tablet 0.4 mg, 0.4 mg, Sublingual, Q5 Min PRN, Huber Novak PA-C, 0.4 mg at 04/25/25 1236    ondansetron disintegrating tablet 8 mg, 8 mg, Oral, Q8H PRN, [place] Bassett MD, 8 mg at 04/26/25 0508    oxyCODONE immediate release tablet 5  mg, 5 mg, Oral, Q4H PRN, [place] Bassett MD, 5 mg at 04/27/25 2132    pantoprazole injection 40 mg, 40 mg, Intravenous, Daily, Chelsea Claros PA, 40 mg at 04/29/25 1147    polyethylene glycol packet 17 g, 17 g, Oral, BID PRN, [place] Bassett MD    potassium bicarbonate disintegrating tablet 30 mEq, 30 mEq, Per OG tube, Once, Mohinder Barrow MD    pregabalin capsule 50 mg, 50 mg, Oral, BID, Marshall Bassett MD, 50 mg at 04/29/25 0918    racepinephrine 2.25 % nebulizer solution 0.5 mL, 0.5 mL, Nebulization, Q4H PRN, Keri Dyson MD, 0.5 mL at 04/27/25 1420    sertraline tablet 25 mg, 25 mg, Oral, Daily, Marshall Bassett MD, 25 mg at 04/29/25 0902    sodium chloride 0.9% flush 10 mL, 10 mL, Intravenous, PRN, Marshall Bassett MD    sucralfate 100 mg/mL suspension 1 g, 1 g, Oral, Q6H, Marshall Bassett MD, 1 g at 04/28/25 1712    tiZANidine tablet 4 mg, 4 mg, Oral, BID, Marshall Bassett MD, 4 mg at 04/29/25 0918    zolpidem tablet 5 mg, 5 mg, Oral, Nightly PRN, Marshall Bassett MD

## 2025-04-29 NOTE — ASSESSMENT & PLAN NOTE
-Unclear etiology but suspect possible CAV as cause   -Patient now starting to wake up and follow commands, CTH w/ no acute findings  -Was intubated for airway protection,   -Pulm consulted.  Corticosteroids given overnight and patient extubated 4/29

## 2025-04-29 NOTE — PROGRESS NOTES
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  She was given corticosteroids overnight and extubated this morning.  No new complaints.  EEG unremarkable.  We started Lovenox for DVT prophylaxis.       Continuous Infusions:   dexmedeTOMIDine (Precedex) infusion (titrating)  0-1.4 mcg/kg/hr Intravenous Continuous   Stopped at 04/29/25 0949    nicardipine  0-15 mg/hr Intravenous Continuous   Stopped at 04/28/25 2307     Scheduled Meds:   aluminum-magnesium hydroxide-simethicone  30 mL Oral QID (AC & HS)    aspirin  81 mg Oral Daily    calcitRIOL  0.25 mcg Oral Daily    carvediloL  3.125 mg Oral BID WM    cefTRIAXone (Rocephin) IV (PEDS and ADULTS)  1 g Intravenous Q24H    cycloSPORINE  75 mg Per NG tube BID    enoxparin  30 mg Subcutaneous Q24H (prophylaxis, 1700)    NIFEdipine  60 mg Oral Daily    pantoprazole  40 mg Intravenous Daily    potassium bicarbonate  30 mEq Per OG tube Once    pregabalin  50 mg Oral BID    sertraline  25 mg Oral Daily    sucralfate  1 g Oral Q6H    tiZANidine  4 mg Oral BID     PRN Meds:  Current Facility-Administered Medications:     acetaminophen, 650 mg, Oral, Q4H PRN    aluminum-magnesium hydroxide-simethicone, 30 mL, Oral, Q6H PRN    fentaNYL, 25 mcg, Intravenous, Q4H PRN    nitroGLYCERIN, 0.4 mg, Sublingual, Q5 Min PRN    nitroGLYCERIN, 0.4 mg, Sublingual, Q5 Min PRN    ondansetron, 8 mg, Oral, Q8H PRN    oxyCODONE, 5 mg, Oral, Q4H PRN    polyethylene glycol, 17 g, Oral, BID PRN    racepinephrine, 0.5 mL, Nebulization, Q4H PRN    sodium chloride 0.9%, 10 mL, Intravenous, PRN    zolpidem, 5 mg, Oral, Nightly  PRN    Review of patient's allergies indicates:   Allergen Reactions    Dobutamine Other (See Comments)     Severe Left sided chest pain after dosage administered for Dobutamine Stress Test; itching    Lisinopril Swelling and Rash    Hydrocodone-acetaminophen Nausea Only    Augmentin [amoxicillin-pot clavulanate] Diarrhea    Zyvox [linezolid] Nausea And Vomiting     Objective:     Vital Signs (Most Recent):  Temp: 97.3 °F (36.3 °C) (04/29/25 1000)  Pulse: 66 (04/29/25 1000)  Resp: (!) 24 (04/29/25 1000)  BP: (!) 155/86 (04/29/25 1000)  SpO2: 98 % (04/29/25 1000) Vital Signs (24h Range):  Temp:  [97.3 °F (36.3 °C)-99.7 °F (37.6 °C)] 97.3 °F (36.3 °C)  Pulse:  [63-74] 66  Resp:  [10-34] 24  SpO2:  [90 %-100 %] 98 %  BP: ()/() 155/86     Patient Vitals for the past 72 hrs (Last 3 readings):   Weight   04/29/25 0301 70 kg (154 lb 5.2 oz)   04/28/25 1030 69 kg (152 lb 1.9 oz)   04/28/25 0701 69.3 kg (152 lb 12.5 oz)     Body mass index is 28.23 kg/m².      Intake/Output Summary (Last 24 hours) at 4/29/2025 1044  Last data filed at 4/29/2025 1000  Gross per 24 hour   Intake 937.5 ml   Output 1025 ml   Net -87.5 ml          Physical Exam  Constitutional:       General: She is not in acute distress.     Appearance: Normal appearance. She is not ill-appearing.   HENT:      Head: Normocephalic and atraumatic.   Cardiovascular:      Rate and Rhythm: Normal rate and regular rhythm.      Pulses: Normal pulses.      Heart sounds: Normal heart sounds.   Pulmonary:      Effort: Pulmonary effort is normal.      Breath sounds: Normal breath sounds.   Chest:      Comments: L ribcage tender to touch  Abdominal:      General: Abdomen is flat. There is no distension.      Palpations: Abdomen is soft.      Tenderness: There is no abdominal tenderness.   Musculoskeletal:      Right lower leg: No edema.      Left lower leg: No edema.   Skin:     General: Skin is warm and dry.   Neurological:      General: No focal deficit  "present.      Mental Status: She is alert and oriented to person, place, and time.   Psychiatric:         Mood and Affect: Mood normal.         Behavior: Behavior normal.            Significant Labs:  CBC:  Recent Labs   Lab 04/27/25  0747 04/28/25  0406 04/29/25  0356   WBC 2.32* 3.55* 3.95   RBC 3.30* 3.68* 3.64*   HGB 9.6* 10.9* 10.5*   HCT 30.0* 32.9* 33.3*   * 149* 170   MCV 91 89 92   MCH 29.1 29.6 28.8   MCHC 32.0 33.1 31.5*     BNP:  Recent Labs   Lab 04/24/25  1343   *     CMP:  Recent Labs   Lab 04/27/25  0747 04/27/25  2131 04/28/25  0406 04/29/25  0356   * 97 86 104   CALCIUM 7.5* 8.1* 8.6* 8.7   ALBUMIN 2.6*  --  3.2* 3.1*   PROT 6.7  --  8.4 8.2    141 139 143   K 3.6 4.7 4.5 4.4   CO2 17* 21* 21* 20*    106 104 107   BUN 33* 33* 31* 36*   CREATININE 3.1* 2.8* 2.8* 2.6*   ALKPHOS 108  --  127 130   *  --  373* 331*   *  --  599* 410*   BILITOT 0.5  --  0.5 0.5      Coagulation:   Recent Labs   Lab 04/27/25  0747   INR 1.0   APTT 22.5     LDH:  No results for input(s): "LDH" in the last 72 hours.  Microbiology:  Microbiology Results (last 7 days)       Procedure Component Value Units Date/Time    Blood culture [[id number]]  (Normal) Collected: 04/27/25 0923    Order Status: Completed Specimen: Blood from Peripheral, Antecubital, Right Updated: 04/28/25 2300     Blood Culture No Growth After 36 Hours    Blood culture [[id number]]  (Normal) Collected: 04/27/25 0942    Order Status: Completed Specimen: Blood from Peripheral, Antecubital, Left Updated: 04/28/25 2300     Blood Culture No Growth After 36 Hours    Blood culture [[id number]]  (Normal) Collected: 04/25/25 0319    Order Status: Completed Specimen: Blood Updated: 04/28/25 1801     Blood Culture No Growth After 72 Hours    Blood culture [[id number]]  (Normal) Collected: 04/25/25 0324    Order Status: Completed Specimen: Blood Updated: 04/28/25 1801     Blood Culture No Growth After 72 Hours    Urine " culture [[id number]] Collected: 04/27/25 0845    Order Status: Sent Specimen: Urine, Catheterized Updated: 04/27/25 1423            I have reviewed all pertinent labs within the past 24 hours.    Estimated Creatinine Clearance: 18.5 mL/min (A) (based on SCr of 2.6 mg/dL (H)).    Diagnostic Results:  I have reviewed all pertinent imaging results/findings within the past 24 hours.  Assessment and Plan:     Ms. Damon is a 68 y/o AAF s/p OHTx 5/27/93 at [facility], s/p PPM same date, mild anemia and leukopenia, OA, cervical spine DJD with radiculopathy and chronic neck pain who presents for her 31st post annual transplant evaluation. Four years ago after her annual was found to have BRCA, underwent excision, chemo/radiation, had a rough time of it, with some complications related to it, however recovered well.      Transferred from OSH ED due to chest pain. She initially was at the [place] for pain management procedure for chronic back pain. In preprocedure area complained of nausea and chest pain having taken a sublingual nitro prior to arrival. Reported intermittent chest pain > 1 week. Took all regular medications on an empty stomach and per notes had n/v after starting IV. ECG with repol abnormalities. Noted to be hypokalemic and hypomagnesemic. Troponin I mildly elevated.     Off note, a few months ago was evaluated by  for a reducible ventral hernia containing portion of the liver no bowel noted on exam or imaging. At that time also reported chronic nausea. Elective hernia repair deemed not emergent and if she should require intervention to be done at center with cardiac surgery and Cardiology heart failure services.      Cardiac PET 11/15/2024    The myocardial perfusion images show no evidence of ischemia or scar.    The whole heart absolute myocardial perfusion values were elevated at rest, low normal during stress and CFR is mildly reduced in part due to elevated resting flow.    CT attenuation images  demonstrate no coronary calcifications and mild diffuse aortic calcifications in the ascending aorta, in the descending aorta and moderate calcfications in the aortic arch.    The gated perfusion images showed an ejection fraction of 56% at rest and 59% during stress. A normal ejection fraction is greater than 47%.    There is normal wall motion at rest and normal wall motion during stress.    The study's ECG is negative for ischemia.       TTE 07/20/2024:    Left Ventricle: The left ventricle is normal in size. Normal wall thickness. There is normal systolic function with a visually estimated ejection fraction of 55 - 60%. There is normal diastolic function.    Right Ventricle: Normal right ventricular cavity size. Wall thickness is normal. Systolic function is normal.    Left Atrium: Patent foramen ovale visualized with predominant right to left shunting indicated by saline contrast.    Tricuspid Valve: There is mild regurgitation.    IVC/SVC: Normal venous pressure at 3 mmHg.    The patient is status post cardiac transplantation.  PFO not present with bubble alone, but with valsalva had very small bubbles come across         Cxr: Comparison is made to January 4, 2023.  Electrical lead overlies the lower chest and upper abdomen.  Surgical clips overlie the upper abdomen.  Mediastinal silhouette is prominent.  The lungs are clear.  No pneumothorax or significant pleural effusion     DSE 05/24/21:  The left ventricle is normal in size with concentric remodeling and normal systolic function.  The patient reached the end of the protocol.  There were no arrhythmias during stress.  Severe left atrial enlargement.  The estimated ejection fraction is 55%.  Grade II left ventricular diastolic dysfunction.  Normal right ventricular size with normal right ventricular systolic function.  Mild-to-moderate mitral regurgitation.  Mild to moderate tricuspid regurgitation.  Normal central venous pressure (3 mmHg).  The estimated  PA systolic pressure is 39 mmHg.  The stress echo portion of this study is negative for myocardial ischemia.  Severe left atrial enlargement.  The ECG portion of this study is negative for myocardial ischemia.    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   -Last PET stress 11/2024  negative for ischemia  - Both TTE and AYAAN done.   -2D echo 4/28/25 EF: 45%, global hypokinesis, diastolic dysfunction.  RV function moderately reduced.  Mild to Mod TR. LVEDD: 4cm  - not currently candidate for angiogram given renal dysfunction  -ASA    S/P orthotopic heart transplant  -Transplant Date 1993 At [facility]   -CMV Status:D?R+   -Rejection status: Moderate Risk  -Last HLA/DSA 7/2/24 week DQ7  -2D echo 4/28/25 EF: 45%, global hypokinesis, diastolic dysfunction.  RV function moderately reduced.  Mild to Mod TR. LVEDD: 4cm  -Immunosuppression: cyclosporine with goal     Cardiac arrest  -Unclear etiology but suspect possible CAV as cause   -Patient now starting to wake up and follow commands, CTH w/ no acute findings  -Was intubated for airway protection,   -Pulm consulted.  Corticosteroids given overnight and patient extubated 4/29    CKD (chronic kidney disease), stage IV  -Baseline creatinine ~3     Essential hypertension  -Will monitor on current regimen     Anemia associated with stage 5 chronic renal failure  -H/H stable  -No signs of bleeding    Immunocompromised state due to drug therapy  S/p heart transplant    Chest pain  -Multifactorial now possibly secondary to cardiac arrest     Chronic idiopathic constipation  - prn laxatives    Hypomagnesemia  -replacing Mag and monitor renal function    Hypokalemia  -replacing electrolytes as needed while monitoring renal function    Anemia  Stable, no acute signs of bleeding    Chronic right-sided thoracic back  pain  -continue home medications    Abnormal serum thyroid stimulating hormone (TSH) level  -repeating thyroid panel    Uninterrupted Critical Care/Counseling Time (not including procedures): 60 minutes      BARON Chao  Heart Transplant  [facility] - Cardiac Intensive Care

## 2025-04-29 NOTE — SUBJECTIVE & OBJECTIVE
Interval History: Appreciate Pulm assistance.  She was given corticosteroids overnight and extubated this morning.  No new complaints.  EEG unremarkable.  We started Lovenox for DVT prophylaxis.       Continuous Infusions:   dexmedeTOMIDine (Precedex) infusion (titrating)  0-1.4 mcg/kg/hr Intravenous Continuous   Stopped at 04/29/25 0949    nicardipine  0-15 mg/hr Intravenous Continuous   Stopped at 04/28/25 2307     Scheduled Meds:   aluminum-magnesium hydroxide-simethicone  30 mL Oral QID (AC & HS)    aspirin  81 mg Oral Daily    calcitRIOL  0.25 mcg Oral Daily    carvediloL  3.125 mg Oral BID WM    cefTRIAXone (Rocephin) IV (PEDS and ADULTS)  1 g Intravenous Q24H    cycloSPORINE  75 mg Per NG tube BID    enoxparin  30 mg Subcutaneous Q24H (prophylaxis, 1700)    NIFEdipine  60 mg Oral Daily    pantoprazole  40 mg Intravenous Daily    potassium bicarbonate  30 mEq Per OG tube Once    pregabalin  50 mg Oral BID    sertraline  25 mg Oral Daily    sucralfate  1 g Oral Q6H    tiZANidine  4 mg Oral BID     PRN Meds:  Current Facility-Administered Medications:     acetaminophen, 650 mg, Oral, Q4H PRN    aluminum-magnesium hydroxide-simethicone, 30 mL, Oral, Q6H PRN    fentaNYL, 25 mcg, Intravenous, Q4H PRN    nitroGLYCERIN, 0.4 mg, Sublingual, Q5 Min PRN    nitroGLYCERIN, 0.4 mg, Sublingual, Q5 Min PRN    ondansetron, 8 mg, Oral, Q8H PRN    oxyCODONE, 5 mg, Oral, Q4H PRN    polyethylene glycol, 17 g, Oral, BID PRN    racepinephrine, 0.5 mL, Nebulization, Q4H PRN    sodium chloride 0.9%, 10 mL, Intravenous, PRN    zolpidem, 5 mg, Oral, Nightly PRN    Review of patient's allergies indicates:   Allergen Reactions    Dobutamine Other (See Comments)     Severe Left sided chest pain after dosage administered for Dobutamine Stress Test; itching    Lisinopril Swelling and Rash    Hydrocodone-acetaminophen Nausea Only    Augmentin [amoxicillin-pot clavulanate] Diarrhea    Zyvox [linezolid] Nausea And Vomiting     Objective:      Vital Signs (Most Recent):  Temp: 97.3 °F (36.3 °C) (04/29/25 1000)  Pulse: 66 (04/29/25 1000)  Resp: (!) 24 (04/29/25 1000)  BP: (!) 155/86 (04/29/25 1000)  SpO2: 98 % (04/29/25 1000) Vital Signs (24h Range):  Temp:  [97.3 °F (36.3 °C)-99.7 °F (37.6 °C)] 97.3 °F (36.3 °C)  Pulse:  [63-74] 66  Resp:  [10-34] 24  SpO2:  [90 %-100 %] 98 %  BP: ()/() 155/86     Patient Vitals for the past 72 hrs (Last 3 readings):   Weight   04/29/25 0301 70 kg (154 lb 5.2 oz)   04/28/25 1030 69 kg (152 lb 1.9 oz)   04/28/25 0701 69.3 kg (152 lb 12.5 oz)     Body mass index is 28.23 kg/m².      Intake/Output Summary (Last 24 hours) at 4/29/2025 1044  Last data filed at 4/29/2025 1000  Gross per 24 hour   Intake 937.5 ml   Output 1025 ml   Net -87.5 ml          Physical Exam  Constitutional:       General: She is not in acute distress.     Appearance: Normal appearance. She is not ill-appearing.   HENT:      Head: Normocephalic and atraumatic.   Cardiovascular:      Rate and Rhythm: Normal rate and regular rhythm.      Pulses: Normal pulses.      Heart sounds: Normal heart sounds.   Pulmonary:      Effort: Pulmonary effort is normal.      Breath sounds: Normal breath sounds.   Chest:      Comments: L ribcage tender to touch  Abdominal:      General: Abdomen is flat. There is no distension.      Palpations: Abdomen is soft.      Tenderness: There is no abdominal tenderness.   Musculoskeletal:      Right lower leg: No edema.      Left lower leg: No edema.   Skin:     General: Skin is warm and dry.   Neurological:      General: No focal deficit present.      Mental Status: She is alert and oriented to person, place, and time.   Psychiatric:         Mood and Affect: Mood normal.         Behavior: Behavior normal.            Significant Labs:  CBC:  Recent Labs   Lab 04/27/25  0747 04/28/25  0406 04/29/25  0356   WBC 2.32* 3.55* 3.95   RBC 3.30* 3.68* 3.64*   HGB 9.6* 10.9* 10.5*   HCT 30.0* 32.9* 33.3*   * 149* 170  "  MCV 91 89 92   MCH 29.1 29.6 28.8   MCHC 32.0 33.1 31.5*     BNP:  Recent Labs   Lab 04/24/25  1343   *     CMP:  Recent Labs   Lab 04/27/25  0747 04/27/25  2131 04/28/25  0406 04/29/25  0356   * 97 86 104   CALCIUM 7.5* 8.1* 8.6* 8.7   ALBUMIN 2.6*  --  3.2* 3.1*   PROT 6.7  --  8.4 8.2    141 139 143   K 3.6 4.7 4.5 4.4   CO2 17* 21* 21* 20*    106 104 107   BUN 33* 33* 31* 36*   CREATININE 3.1* 2.8* 2.8* 2.6*   ALKPHOS 108  --  127 130   *  --  373* 331*   *  --  599* 410*   BILITOT 0.5  --  0.5 0.5      Coagulation:   Recent Labs   Lab 04/27/25  0747   INR 1.0   APTT 22.5     LDH:  No results for input(s): "LDH" in the last 72 hours.  Microbiology:  Microbiology Results (last 7 days)       Procedure Component Value Units Date/Time    Blood culture [[id number]]  (Normal) Collected: 04/27/25 0923    Order Status: Completed Specimen: Blood from Peripheral, Antecubital, Right Updated: 04/28/25 2300     Blood Culture No Growth After 36 Hours    Blood culture [[id number]]  (Normal) Collected: 04/27/25 0942    Order Status: Completed Specimen: Blood from Peripheral, Antecubital, Left Updated: 04/28/25 2300     Blood Culture No Growth After 36 Hours    Blood culture [[id number]]  (Normal) Collected: 04/25/25 0319    Order Status: Completed Specimen: Blood Updated: 04/28/25 1801     Blood Culture No Growth After 72 Hours    Blood culture [[id number]]  (Normal) Collected: 04/25/25 0324    Order Status: Completed Specimen: Blood Updated: 04/28/25 1801     Blood Culture No Growth After 72 Hours    Urine culture [[id number]] Collected: 04/27/25 0845    Order Status: Sent Specimen: Urine, Catheterized Updated: 04/27/25 1428            I have reviewed all pertinent labs within the past 24 hours.    Estimated Creatinine Clearance: 18.5 mL/min (A) (based on SCr of 2.6 mg/dL (H)).    Diagnostic Results:  I have reviewed all pertinent imaging results/findings within the past 24 hours.  "

## 2025-04-29 NOTE — ASSESSMENT & PLAN NOTE
-ECG repeated bifascicular block noted, old ST depression high lateral leads, T wave inversion anterolateral leads.   -Last PET stress 11/2024  negative for ischemia  - Both TTE and AYAAN done.   -2D echo 4/28/25 EF: 45%, global hypokinesis, diastolic dysfunction.  RV function moderately reduced.  Mild to Mod TR. LVEDD: 4cm  - not currently candidate for angiogram given renal dysfunction  -ASA

## 2025-04-29 NOTE — ASSESSMENT & PLAN NOTE
69 y.o. female with PMHx of HTN, heart transplant 32 years ago, CKD, CVA, anxiety, depression, fibromyalgia, breast cancer who is admitted to [facility] [place] as a transfer from [facility] ED for chest pain.  Admitted for CAD of transplanted heart with stable angina pectoris, intubated following PEA arrest. Passing SBT with exception of cuff leak. Primary team attempted racemic epinephrine without improvement. Pulmonology consulted for cuff leak and assistance with extubation.     With ETT cuff deflated and following oropharyngeal suction, no audible transmitted upper airway sounds and no significant change in delivered vs  tidal volumes c/f possible airway edema.     Recommendations:  -- received 40mg methylprednisolone this morning   -- extubated on pulmonology rounds this morning with respiratory therapy, satting well on room air, no respiratory distress  -- please contact if any concerns, will sign off

## 2025-04-29 NOTE — EICU
Intervention Initiated From:  [facility] / [facility]U    Diana intervened regarding:  Rounding (Video assessment)    VICU Night Rounds Checklist  24H Vital Sign Range:  Temp:  [98.2 °F (36.8 °C)-100.2 °F (37.9 °C)]   Pulse:  [64-78]   Resp:  [9-47]   BP: ()/()   SpO2:  [96 %-100 %]     Video rounds and LDA reconciliation

## 2025-04-29 NOTE — PROGRESS NOTES
Update     Pt extubated this morning. Met with pt in room as a follow-up. Pt stated that her throat was sore due to recent extubation. Informed pt that SW will f/u with her when she was feeling better. Pt reports coping well and denies further needs, questions, concerns at this time and none indicated. Providing ongoing psychosocial and counseling support, education, resources, assistance and discharge planning as indicated. Following and available.

## 2025-04-29 NOTE — NURSING
BP increase throughout the night. Sys 160s-170s. Treated for pain, repositioned cuff w/ no improvement. MD Roya made aware. No new orders at this time.

## 2025-04-29 NOTE — EICU
Intervention Initiated From:  [facility] / EICU    Diana intervened regarding:  Rounding (Video assessment)    Virtual ICU Quality Rounds    Admit Date: 4/24/2025  Hospital Day: 4    ICU Day: 2d 2h    24H Vital Sign Range:  Temp:  [97.3 °F (36.3 °C)-99.9 °F (37.7 °C)]   Pulse:  [63-76]   Resp:  [9-34]   BP: ()/()   SpO2:  [90 %-100 %]     VICU Surveillance Screening    Daily review of  line necessity(optional): Urinary catheter in place            Swain Indications : Critically ill in the intensive care unit requiring hourly monitoring     LDA reconciliation : Yes

## 2025-04-29 NOTE — SUBJECTIVE & OBJECTIVE
Interval History: Methylprednisolone 40mg given at 0300 today. Remains intubated.       Objective:     Vital Signs (Most Recent):  Temp: 97.3 °F (36.3 °C) (04/29/25 0956)  Pulse: 68 (04/29/25 0956)  Resp: 19 (04/29/25 0956)  BP: 138/82 (04/29/25 0700)  SpO2: 98 % (04/29/25 0956) Vital Signs (24h Range):  Temp:  [97.3 °F (36.3 °C)-99.7 °F (37.6 °C)] 97.3 °F (36.3 °C)  Pulse:  [63-75] 68  Resp:  [9-34] 19  SpO2:  [90 %-100 %] 98 %  BP: ()/() 138/82     Weight: 70 kg (154 lb 5.2 oz)  Body mass index is 28.23 kg/m².      Intake/Output Summary (Last 24 hours) at 4/29/2025 1005  Last data filed at 4/29/2025 0701  Gross per 24 hour   Intake 894.88 ml   Output 1025 ml   Net -130.12 ml        Physical Exam  Constitutional:       General: She is not in acute distress.     Appearance: Normal appearance. She is not ill-appearing.      Interventions: She is sedated, intubated and restrained.   HENT:      Head: Normocephalic and atraumatic.   Eyes:      Extraocular Movements: Extraocular movements intact.   Cardiovascular:      Rate and Rhythm: Normal rate and regular rhythm.   Pulmonary:      Effort: Pulmonary effort is normal. She is intubated.      Breath sounds: No wheezing or rales.   Abdominal:      General: Abdomen is flat.      Palpations: Abdomen is soft.      Tenderness: There is no abdominal tenderness.   Musculoskeletal:      Right lower leg: No edema.      Left lower leg: No edema.   Skin:     General: Skin is warm and dry.   Neurological:      Mental Status: She is easily aroused.           Review of Systems   Unable to perform ROS: Intubated       Vents:  Vent Mode: Spont (04/29/25 0750)  Ventilator Initiated: Yes (04/27/25 1808)  Set Rate: 18 BPM (04/27/25 1143)  Vt Set: 430 mL (04/27/25 1143)  Pressure Support: 10 cmH20 (04/29/25 0750)  PEEP/CPAP: 5 cmH20 (04/29/25 0750)  Oxygen Concentration (%): 30 (04/29/25 0750)  Peak Airway Pressure: 15 cmH20 (04/29/25 0750)  Plateau Pressure: 0 cmH20 (04/29/25  0750)  Total Ve: 6.25 L/m (04/29/25 0750)  Negative Inspiratory Force (cm H2O): -48 (04/29/25 0750)  F/VT Ratio<105 (RSBI): (!) 36.41 (04/29/25 0750)    Lines/Drains/Airways       Drain  Duration                  NG/OG Tube 04/27/25 0900 Center mouth 2 days         Urethral Catheter 04/27/25 0800 2 days              Peripheral Intravenous Line  Duration                  Peripheral IV - Single Lumen 04/25/25 1043 20 G 1 3/4 in Anterior;Right Upper Arm 3 days         Peripheral IV - Single Lumen 04/27/25 0854 20 G Anterior;Left Forearm 2 days                    Significant Labs:    CBC/Anemia Profile:  Recent Labs   Lab 04/28/25  0406 04/29/25  0356   WBC 3.55* 3.95   HGB 10.9* 10.5*   HCT 32.9* 33.3*   * 170   MCV 89 92   RDW 15.9* 15.8*        Chemistries:  Recent Labs   Lab 04/27/25  2131 04/28/25  0406 04/29/25  0356    139 143   K 4.7 4.5 4.4    104 107   CO2 21* 21* 20*   BUN 33* 31* 36*   CREATININE 2.8* 2.8* 2.6*   CALCIUM 8.1* 8.6* 8.7   ALBUMIN  --  3.2* 3.1*   PROT  --  8.4 8.2   BILITOT  --  0.5 0.5   ALKPHOS  --  127 130   ALT  --  373* 331*   AST  --  599* 410*   MG  --  2.4 2.5   PHOS  --  3.6 3.4       All pertinent labs within the past 24 hours have been reviewed.    Significant Imaging:  I have reviewed all pertinent imaging results/findings within the past 24 hours.

## 2025-04-29 NOTE — ASSESSMENT & PLAN NOTE
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  RV function moderately reduced.  Mild to Mod TR. LVEDD: 4cm  -Immunosuppression: cyclosporine with goal

## 2025-04-29 NOTE — PLAN OF CARE
CICU DAILY GOALS       A: Awake    RASS: Goal -    Actual - RASS (Robledo Agitation-Sedation Scale): restless   Restraint necessity: Clinical Justification: Removing medical devices  B: Breath   SBT: Pass   C: Coordinate A & B, analgesics/sedatives   Pain: managed    SAT: Pass  D: Delirium   CAM-ICU:    E: Early(intubated/ Progressive (non-intubated) Mobility   MOVE Screen:  Intubated   Activity: Activity Management: Patient unable to perform activities (Intubated)  FAS: Feeding/Nutrition   Diet order: Diet/Nutrition Received: NPO,   Fluid restriction:     Nutritional Supplement Intake: Quantity N/A, Type:  N/A  T: Thrombus   DVT prophylaxis: VTE Core Measure: Pharmacological prophylaxis initiated/maintained  H: HOB Elevation   Head of Bed (HOB) Positioning: HOB at 20-30 degrees  U: Ulcer Prophylaxis   GI: yes  G: Glucose control   managed    S: Skin   Bathing/Skin Care: bath, complete;dressed/undressed;linen changed (04/27/25 0745)  Wounds: No  Wound care consulted: No  B: Bowel Function   no issues   I: Indwelling Catheters   Swain necessity:      Urethral Catheter 04/27/25 0800-Reason for Continuing Urinary Catheterization: Critically ill in ICU and requiring hourly monitoring of intake/output   CVC necessity: Yes  D: De-escalation Antibx   Yes  Plan for the day   Monitor labs. Prepare for extubation 4/29/25.   Family/Goals of care/Code Status   Code Status: Full Code     No acute events throughout day, VS and assessment per flow sheet, patient progressing towards goals as tolerated, plan of care reviewed with Nadia Damon and family, all concerns addressed, will continue to monitor.

## 2025-04-29 NOTE — NURSING
1455:  Patient suddenly complaining of chest pressure. VSS, EKG looks unchanged from previous.     Chelsea Claros with [facility] notified. Orders received for SL nitro at this time.     3:35 PM  Patient stating she feels like she felt before she passed out the other day, states the pain is now underneath her left breast.     Paged Hyacinth, orders for morphine at this time.       -

## 2025-04-29 NOTE — PROGRESS NOTES
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 female with PMHx of HTN, heart transplant 32 years ago, CKD, CVA, anxiety, depression, fibromyalgia, breast cancer who is admitted to [facility] [place] a [place] from [facility] ED for chest pain. Patient presented to ED from [facility] for pain medicine procedure when noted to have chest pain and nausea in the pre-procedure area. She was transferred to [facility] for heart transplant specialty services. Admitted for CAD of transplanted heart with stable angina pectoris. She underwent AYAAN 4/26/25 for suspicion of aortic dissection which was negative. On 4/27/25, code blue was called for PEA arrest. ROSC achieved following compressions and 1mg epinephrine. Remained obtunded and began vomiting. Concern patient unable to protect airway and subsequently intubated. Later in the day, patient passed SBT with exception of cuff leak. Attempted racemic epinephrine with no improvement. Has not received steroids. Pulmonology consulted for assistance with extubation and c/f airway edema given no cuff leak.     Overview/Hospital Course:  No notes on file    Interval History: Methylprednisolone 40mg given at 0300 today. Remains intubated.       Objective:     Vital Signs (Most Recent):  Temp: 97.3 °F (36.3 °C) (04/29/25 0956)  Pulse: 68 (04/29/25 0956)  Resp: 19 (04/29/25 0956)  BP: 138/82 (04/29/25 0700)  SpO2: 98 % (04/29/25 0956) Vital Signs (24h Range):  Temp:  [97.3 °F (36.3 °C)-99.7 °F (37.6 °C)] 97.3 °F (36.3 °C)  Pulse:  [63-75] 68  Resp:  [9-34] 19  SpO2:  [90 %-100 %] 98 %  BP: ()/() 138/82      Weight: 70 kg (154 lb 5.2 oz)  Body mass index is 28.23 kg/m².      Intake/Output Summary (Last 24 hours) at 4/29/2025 1005  Last data filed at 4/29/2025 0701  Gross per 24 hour   Intake 894.88 ml   Output 1025 ml   Net -130.12 ml        Physical Exam  Constitutional:       General: She is not in acute distress.     Appearance: Normal appearance. She is not ill-appearing.      Interventions: She is sedated, intubated and restrained.   HENT:      Head: Normocephalic and atraumatic.   Eyes:      Extraocular Movements: Extraocular movements intact.   Cardiovascular:      Rate and Rhythm: Normal rate and regular rhythm.   Pulmonary:      Effort: Pulmonary effort is normal. She is intubated.      Breath sounds: No wheezing or rales.   Abdominal:      General: Abdomen is flat.      Palpations: Abdomen is soft.      Tenderness: There is no abdominal tenderness.   Musculoskeletal:      Right lower leg: No edema.      Left lower leg: No edema.   Skin:     General: Skin is warm and dry.   Neurological:      Mental Status: She is easily aroused.           Review of Systems   Unable to perform ROS: Intubated       Vents:  Vent Mode: Spont (04/29/25 0750)  Ventilator Initiated: Yes (04/27/25 1808)  Set Rate: 18 BPM (04/27/25 1143)  Vt Set: 430 mL (04/27/25 1143)  Pressure Support: 10 cmH20 (04/29/25 0750)  PEEP/CPAP: 5 cmH20 (04/29/25 0750)  Oxygen Concentration (%): 30 (04/29/25 0750)  Peak Airway Pressure: 15 cmH20 (04/29/25 0750)  Plateau Pressure: 0 cmH20 (04/29/25 0750)  Total Ve: 6.25 L/m (04/29/25 0750)  Negative Inspiratory Force (cm H2O): -48 (04/29/25 0750)  F/VT Ratio<105 (RSBI): (!) 36.41 (04/29/25 0750)    Lines/Drains/Airways       Drain  Duration                  NG/OG Tube 04/27/25 0900 Center mouth 2 days         Urethral Catheter 04/27/25 0800 2 days              Peripheral Intravenous Line  Duration                  Peripheral IV - Single Lumen 04/25/25 1043 20 G 1 3/4 in Anterior;Right Upper Arm 3 days          Peripheral IV - Single Lumen 25 0854 20 G Anterior;Left Forearm 2 days                    Significant Labs:    CBC/Anemia Profile:  Recent Labs   Lab 25  0406 25  0356   WBC 3.55* 3.95   HGB 10.9* 10.5*   HCT 32.9* 33.3*   * 170   MCV 89 92   RDW 15.9* 15.8*        Chemistries:  Recent Labs   Lab 25  2131 25  0406 25  0356    139 143   K 4.7 4.5 4.4    104 107   CO2 21* 21* 20*   BUN 33* 31* 36*   CREATININE 2.8* 2.8* 2.6*   CALCIUM 8.1* 8.6* 8.7   ALBUMIN  --  3.2* 3.1*   PROT  --  8.4 8.2   BILITOT  --  0.5 0.5   ALKPHOS  --  127 130   ALT  --  373* 331*   AST  --  599* 410*   MG  --  2.4 2.5   PHOS  --  3.6 3.4       All pertinent labs within the past 24 hours have been reviewed.    Significant Imaging:  I have reviewed all pertinent imaging results/findings within the past 24 hours.    Assessment/Plan:     Palliative Care  Endotracheally intubated  69 y.o. female with PMHx of HTN, heart transplant 32 years ago, CKD, CVA, anxiety, depression, fibromyalgia, breast cancer who [place] [facility] [place] [facility] [place] for chest pain.  Admitted for CAD of transplanted heart with stable angina pectoris, intubated following PEA arrest. Passing SBT with exception of cuff leak. Primary team attempted racemic epinephrine without improvement. Pulmonology consulted for cuff leak and assistance with extubation.     With ETT cuff deflated and following oropharyngeal suction, no audible transmitted upper airway sounds and no significant change in delivered vs  tidal volumes c/f possible airway edema.     Recommendations:  -- received 40mg methylprednisolone this morning   -- extubated on pulmonology rounds this morning with respiratory therapy, satting well on room air, no respiratory distress  -- please contact if any concerns, will sign off          Rah Lai MD  Pulmonology  [facility] - Cardiac Intensive Care

## 2025-04-30 PROBLEM — Z94.1 HEART TRANSPLANTED: Status: ACTIVE | Noted: 2025-04-30

## 2025-04-30 PROBLEM — Z71.89 ACP (ADVANCE CARE PLANNING): Status: ACTIVE | Noted: 2025-04-30

## 2025-04-30 PROBLEM — Z51.5 PALLIATIVE CARE ENCOUNTER: Status: ACTIVE | Noted: 2025-04-30

## 2025-04-30 LAB
ABSOLUTE EOSINOPHIL (OHS): 0 K/UL
ABSOLUTE MONOCYTE (OHS): 0.6 K/UL (ref 0.3–1)
ABSOLUTE NEUTROPHIL COUNT (OHS): 3.96 K/UL (ref 1.8–7.7)
ALBUMIN SERPL BCP-MCNC: 2.9 G/DL (ref 3.5–5.2)
ALP SERPL-CCNC: 117 UNIT/L (ref 40–150)
ALT SERPL W/O P-5'-P-CCNC: 208 UNIT/L (ref 10–44)
ANION GAP (OHS): 17 MMOL/L (ref 8–16)
AST SERPL-CCNC: 175 UNIT/L (ref 11–45)
BACTERIA BLD CULT: NORMAL
BACTERIA BLD CULT: NORMAL
BASOPHILS # BLD AUTO: 0 K/UL
BASOPHILS NFR BLD AUTO: 0 %
BILIRUB SERPL-MCNC: 0.4 MG/DL (ref 0.1–1)
BUN SERPL-MCNC: 57 MG/DL (ref 8–23)
CALCIUM SERPL-MCNC: 8.3 MG/DL (ref 8.7–10.5)
CHLORIDE SERPL-SCNC: 109 MMOL/L (ref 95–110)
CO2 SERPL-SCNC: 15 MMOL/L (ref 23–29)
CREAT SERPL-MCNC: 3.5 MG/DL (ref 0.5–1.4)
CYCLOSPORINE BLD LC/MS/MS-MCNC: 144 NG/ML (ref 100–400)
ERYTHROCYTE [DISTWIDTH] IN BLOOD BY AUTOMATED COUNT: 16.2 % (ref 11.5–14.5)
GFR SERPLBLD CREATININE-BSD FMLA CKD-EPI: 14 ML/MIN/1.73/M2
GLUCOSE SERPL-MCNC: 109 MG/DL (ref 70–110)
HCT VFR BLD AUTO: 27 % (ref 37–48.5)
HGB BLD-MCNC: 8.7 GM/DL (ref 12–16)
IMM GRANULOCYTES # BLD AUTO: 0.02 K/UL (ref 0–0.04)
IMM GRANULOCYTES NFR BLD AUTO: 0.4 % (ref 0–0.5)
LYMPHOCYTES # BLD AUTO: 0.7 K/UL (ref 1–4.8)
MAGNESIUM SERPL-MCNC: 2.7 MG/DL (ref 1.6–2.6)
MCH RBC QN AUTO: 29.2 PG (ref 27–31)
MCHC RBC AUTO-ENTMCNC: 32.2 G/DL (ref 32–36)
MCV RBC AUTO: 91 FL (ref 82–98)
NUCLEATED RBC (/100WBC) (OHS): 0 /100 WBC
OHS QRS DURATION: 148 MS
OHS QRS DURATION: 152 MS
OHS QRS DURATION: 154 MS
OHS QTC CALCULATION: 566 MS
OHS QTC CALCULATION: 573 MS
OHS QTC CALCULATION: 626 MS
PHOSPHATE SERPL-MCNC: 3.8 MG/DL (ref 2.7–4.5)
PLATELET # BLD AUTO: 149 K/UL (ref 150–450)
PMV BLD AUTO: 10.3 FL (ref 9.2–12.9)
POTASSIUM SERPL-SCNC: 4.6 MMOL/L (ref 3.5–5.1)
PROT SERPL-MCNC: 7.1 GM/DL (ref 6–8.4)
RBC # BLD AUTO: 2.98 M/UL (ref 4–5.4)
RELATIVE EOSINOPHIL (OHS): 0 %
RELATIVE LYMPHOCYTE (OHS): 13.3 % (ref 18–48)
RELATIVE MONOCYTE (OHS): 11.4 % (ref 4–15)
RELATIVE NEUTROPHIL (OHS): 74.9 % (ref 38–73)
SODIUM SERPL-SCNC: 141 MMOL/L (ref 136–145)
WBC # BLD AUTO: 5.28 K/UL (ref 3.9–12.7)

## 2025-04-30 PROCEDURE — 97530 THERAPEUTIC ACTIVITIES: CPT | Mod: HCNC

## 2025-04-30 PROCEDURE — 63600175 PHARM REV CODE 636 W HCPCS: Mod: HCNC | Performed by: STUDENT IN AN ORGANIZED HEALTH CARE EDUCATION/TRAINING PROGRAM

## 2025-04-30 PROCEDURE — 97166 OT EVAL MOD COMPLEX 45 MIN: CPT | Mod: HCNC

## 2025-04-30 PROCEDURE — 82040 ASSAY OF SERUM ALBUMIN: CPT | Mod: HCNC

## 2025-04-30 PROCEDURE — 25000003 PHARM REV CODE 250: Mod: HCNC | Performed by: PHYSICIAN ASSISTANT

## 2025-04-30 PROCEDURE — 85025 COMPLETE CBC W/AUTO DIFF WBC: CPT | Mod: HCNC

## 2025-04-30 PROCEDURE — 63600175 PHARM REV CODE 636 W HCPCS: Mod: HCNC | Performed by: PHYSICIAN ASSISTANT

## 2025-04-30 PROCEDURE — 99223 1ST HOSP IP/OBS HIGH 75: CPT | Mod: HCNC,25,,

## 2025-04-30 PROCEDURE — 25000003 PHARM REV CODE 250: Mod: HCNC | Performed by: STUDENT IN AN ORGANIZED HEALTH CARE EDUCATION/TRAINING PROGRAM

## 2025-04-30 PROCEDURE — 84100 ASSAY OF PHOSPHORUS: CPT | Mod: HCNC

## 2025-04-30 PROCEDURE — 99233 SBSQ HOSP IP/OBS HIGH 50: CPT | Mod: FS,HCNC,, | Performed by: PHYSICIAN ASSISTANT

## 2025-04-30 PROCEDURE — 63600175 PHARM REV CODE 636 W HCPCS: Mod: HCNC

## 2025-04-30 PROCEDURE — 94761 N-INVAS EAR/PLS OXIMETRY MLT: CPT | Mod: HCNC

## 2025-04-30 PROCEDURE — 97161 PT EVAL LOW COMPLEX 20 MIN: CPT | Mod: HCNC

## 2025-04-30 PROCEDURE — 83735 ASSAY OF MAGNESIUM: CPT | Mod: HCNC

## 2025-04-30 PROCEDURE — 25000242 PHARM REV CODE 250 ALT 637 W/ HCPCS: Mod: HCNC | Performed by: PHYSICIAN ASSISTANT

## 2025-04-30 PROCEDURE — 97535 SELF CARE MNGMENT TRAINING: CPT | Mod: HCNC

## 2025-04-30 PROCEDURE — 63600175 PHARM REV CODE 636 W HCPCS: Mod: HCNC | Performed by: NURSE PRACTITIONER

## 2025-04-30 PROCEDURE — 80158 DRUG ASSAY CYCLOSPORINE: CPT | Mod: HCNC | Performed by: INTERNAL MEDICINE

## 2025-04-30 PROCEDURE — 11000001 HC ACUTE MED/SURG PRIVATE ROOM: Mod: HCNC

## 2025-04-30 PROCEDURE — 99497 ADVNCD CARE PLAN 30 MIN: CPT | Mod: HCNC,25,,

## 2025-04-30 PROCEDURE — 93010 ELECTROCARDIOGRAM REPORT: CPT | Mod: ,,, | Performed by: INTERNAL MEDICINE

## 2025-04-30 RX ORDER — PANTOPRAZOLE SODIUM 40 MG/1
40 TABLET, DELAYED RELEASE ORAL DAILY
Status: DISCONTINUED | OUTPATIENT
Start: 2025-05-01 | End: 2025-05-06 | Stop reason: HOSPADM

## 2025-04-30 RX ORDER — PREGABALIN 50 MG/1
50 CAPSULE ORAL NIGHTLY
Status: DISCONTINUED | OUTPATIENT
Start: 2025-05-01 | End: 2025-05-06 | Stop reason: HOSPADM

## 2025-04-30 RX ORDER — PROCHLORPERAZINE EDISYLATE 5 MG/ML
2.5 INJECTION INTRAMUSCULAR; INTRAVENOUS ONCE
Status: COMPLETED | OUTPATIENT
Start: 2025-04-30 | End: 2025-04-30

## 2025-04-30 RX ORDER — CYCLOSPORINE 100 MG/ML
50 SOLUTION ORAL 2 TIMES DAILY
Status: DISCONTINUED | OUTPATIENT
Start: 2025-04-30 | End: 2025-05-06 | Stop reason: HOSPADM

## 2025-04-30 RX ORDER — NITROGLYCERIN 0.4 MG/1
0.4 TABLET SUBLINGUAL ONCE
Status: COMPLETED | OUTPATIENT
Start: 2025-04-30 | End: 2025-04-30

## 2025-04-30 RX ADMIN — PANTOPRAZOLE SODIUM 40 MG: 40 INJECTION, POWDER, FOR SOLUTION INTRAVENOUS at 08:04

## 2025-04-30 RX ADMIN — SUCRALFATE 1 G: 1 SUSPENSION ORAL at 06:04

## 2025-04-30 RX ADMIN — PROCHLORPERAZINE EDISYLATE 2.5 MG: 5 INJECTION INTRAMUSCULAR; INTRAVENOUS at 09:04

## 2025-04-30 RX ADMIN — TIZANIDINE 4 MG: 4 TABLET ORAL at 09:04

## 2025-04-30 RX ADMIN — NITROGLYCERIN 0.4 MG: 0.4 TABLET, ORALLY DISINTEGRATING SUBLINGUAL at 05:04

## 2025-04-30 RX ADMIN — SUCRALFATE 1 G: 1 SUSPENSION ORAL at 12:04

## 2025-04-30 RX ADMIN — MORPHINE SULFATE 2 MG: 2 INJECTION, SOLUTION INTRAMUSCULAR; INTRAVENOUS at 04:04

## 2025-04-30 RX ADMIN — ALUMINUM HYDROXIDE, MAGNESIUM HYDROXIDE, AND SIMETHICONE 30 ML: 200; 200; 20 SUSPENSION ORAL at 04:04

## 2025-04-30 RX ADMIN — SERTRALINE HYDROCHLORIDE 25 MG: 25 TABLET ORAL at 08:04

## 2025-04-30 RX ADMIN — CARVEDILOL 3.12 MG: 3.12 TABLET, FILM COATED ORAL at 08:04

## 2025-04-30 RX ADMIN — ALUMINUM HYDROXIDE, MAGNESIUM HYDROXIDE, AND SIMETHICONE 30 ML: 200; 200; 20 SUSPENSION ORAL at 09:04

## 2025-04-30 RX ADMIN — CYCLOSPORINE 75 MG: 100 SOLUTION ORAL at 08:04

## 2025-04-30 RX ADMIN — ONDANSETRON 8 MG: 4 TABLET, ORALLY DISINTEGRATING ORAL at 03:04

## 2025-04-30 RX ADMIN — MORPHINE SULFATE 2 MG: 2 INJECTION, SOLUTION INTRAMUSCULAR; INTRAVENOUS at 08:04

## 2025-04-30 RX ADMIN — CYCLOSPORINE 50 MG: 100 SOLUTION ORAL at 05:04

## 2025-04-30 RX ADMIN — ALUMINUM HYDROXIDE, MAGNESIUM HYDROXIDE, AND SIMETHICONE 30 ML: 200; 200; 20 SUSPENSION ORAL at 11:04

## 2025-04-30 RX ADMIN — ZOLPIDEM TARTRATE 5 MG: 5 TABLET, FILM COATED ORAL at 09:04

## 2025-04-30 RX ADMIN — OXYCODONE 5 MG: 5 TABLET ORAL at 05:04

## 2025-04-30 RX ADMIN — SODIUM CHLORIDE 250 ML: 9 INJECTION, SOLUTION INTRAVENOUS at 10:04

## 2025-04-30 RX ADMIN — PREGABALIN 50 MG: 50 CAPSULE ORAL at 08:04

## 2025-04-30 RX ADMIN — TIZANIDINE 4 MG: 4 TABLET ORAL at 08:04

## 2025-04-30 RX ADMIN — ASPIRIN 81 MG CHEWABLE TABLET 81 MG: 81 TABLET CHEWABLE at 08:04

## 2025-04-30 RX ADMIN — ENOXAPARIN SODIUM 30 MG: 30 INJECTION SUBCUTANEOUS at 04:04

## 2025-04-30 RX ADMIN — NIFEDIPINE 60 MG: 30 TABLET, FILM COATED, EXTENDED RELEASE ORAL at 10:04

## 2025-04-30 RX ADMIN — MORPHINE SULFATE 2 MG: 2 INJECTION, SOLUTION INTRAMUSCULAR; INTRAVENOUS at 03:04

## 2025-04-30 RX ADMIN — MORPHINE SULFATE 2 MG: 2 INJECTION, SOLUTION INTRAMUSCULAR; INTRAVENOUS at 09:04

## 2025-04-30 RX ADMIN — ALUMINUM HYDROXIDE, MAGNESIUM HYDROXIDE, AND SIMETHICONE 30 ML: 200; 200; 20 SUSPENSION ORAL at 06:04

## 2025-04-30 RX ADMIN — SUCRALFATE 1 G: 1 SUSPENSION ORAL at 11:04

## 2025-04-30 RX ADMIN — CEFTRIAXONE 1 G: 1 INJECTION, POWDER, FOR SOLUTION INTRAMUSCULAR; INTRAVENOUS at 01:04

## 2025-04-30 RX ADMIN — CALCITRIOL CAPSULES 0.25 MCG 0.25 MCG: 0.25 CAPSULE ORAL at 08:04

## 2025-04-30 RX ADMIN — SUCRALFATE 1 G: 1 SUSPENSION ORAL at 05:04

## 2025-04-30 RX ADMIN — CARVEDILOL 3.12 MG: 3.12 TABLET, FILM COATED ORAL at 04:04

## 2025-04-30 NOTE — PT/OT/SLP EVAL
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  She presents with the following performance deficits affecting function: weakness, impaired endurance, impaired self care skills, impaired functional mobility, gait instability, impaired balance, pain, impaired cardiopulmonary response to activity.      Pt agreeable to session, motivated to participate, and tolerated well. Pt limited in optimal participation in session secondary to increased c/o chest pain throughout (cardiac arrest on 4/27 requiring chest compressions). Pt demonstrating generalized weakness, increased pain, and mild gait instability as compared to her functional baseline. Pt would benefit from skilled OT services in the acute care setting to improve activity tolerance and functional endurance, increase participation in self-care routines, improve functional strength needed for safety with functional transfers and mobility, and facilitate a return to PLOF and least restrictive home environment. Patient currently demonstrates a need for low intensity therapy on a scheduled basis secondary to a decline in functional status due to injury and disease.    Rehab Prognosis: Good; patient would benefit from acute skilled OT services to address these deficits and reach maximum level of function.       Plan:     Patient to be seen 4 x/week to address the above listed problems via  self-care/home management, therapeutic activities, therapeutic exercises, neuromuscular re-education  Plan of Care Expires: 05/28/25  Plan of Care Reviewed with: patient    Subjective     Chief Complaint: chest pain  Patient/Family Comments/goals: Pt reporting multiple falls recently, mostly due to stumbling/catching her foot during mobility.     Occupational Profile:  Living Environment: Pt lives in an [facility] ([facility] which is single-level with no LORI. She has access to a tub/shower combo with a bath bench.   Previous level of function: IND with ADLs, mod I with mobility with sp cane  Equipment Used at Home: cane, straight, walker, rolling, bath bench  Assistance upon Discharge: friends and [employer] community members available to assist as needed    Pain/Comfort:  Pain Rating 1: 10/10  Location - Orientation 1: generalized  Location 1: chest  Pain Addressed 1: Reposition, Distraction, Cessation of Activity  Pain Rating Post-Intervention 1: 10/10    Patients cultural, spiritual, Mandaeism conflicts given the current situation: no    Objective:     Communicated with: Nursing prior to session.  Patient found HOB elevated with peripheral IV, telemetry, PureWick, blood pressure cuff, pulse ox (continuous) upon OT entry to room.    General Precautions: Standard, fall  Orthopedic Precautions: N/A  Braces: N/A  Respiratory Status: Room air    Occupational Performance:    Bed Mobility:    Patient completed Scooting/Bridging with contact guard assistance  Patient completed Supine to Sit with contact guard assistance and increased time    Functional Mobility/Transfers:  Patient completed Sit <> Stand Transfer with contact guard assistance  with  hand-held assist and straight cane   Functional Mobility: Pt able to tolerate ambulating throughout room to simulate household/community distances ~8 ft with sp cane, HHA, and min assist provided. No LOB or SOB noted.     Activities of Daily Living:  Feeding:  set up assistance  provided for breakfast tray on bedside table at end of session  Upper Body Dressing: moderate assistance to adjust hospital gown in seated EOB  Lower Body Dressing: maximal assistance to don hospital socks in seated EOB and minimal assistance provided to don personal pajama pants in seated EOB and in standing  Toileting: dependence Pure Wick    Cognitive/Visual Perceptual:  Cognitive/Psychosocial Skills:     -       Oriented to: Person, Place, Time, and Situation   -       Follows Commands/attention:Follows multistep  commands  -       Communication: clear/fluent  -       Memory: No Deficits noted  -       Safety awareness/insight to disability: impaired   -       Mood/Affect/Coping skills/emotional control: Appropriate to situation, Cooperative, and Pleasant    Physical Exam:  Sensation:    -       Intact  light/touch BUE  Upper Extremity Range of Motion:     -       Right Upper Extremity: WFL  -       Left Upper Extremity: WFL  Upper Extremity Strength:    -       Right Upper Extremity: WFL  -       Left Upper Extremity: WFL   Strength:    -       Right Upper Extremity: WFL  -       Left Upper Extremity: WFL  Fine Motor Coordination:    -       Intact  Left hand thumb/finger opposition skills, Right hand thumb/finger opposition skills, Left hand, manipulation of objects, and Right hand, manipulation of objects  Gross motor coordination:   WFL    AMPAC 6 Click ADL:  AMPAC Total Score: 19    Treatment & Education:  Provided education on the role of OT, POC, and therapy goals while in the acute care setting.   Provided education on the importance of continued mobilization and participation in OOB activities to increase functional endurance and activity tolerance for increased participation in ADL routines.   Provided education on safe transfer techniques and proper hand placement to promote safety awareness and prevent falls with functional transfers.   All questions/concerns within the scope of OT  answered/addressed - pt verbalized understanding.   Instructed pt to call for assistance when wanting to ambulate or participate in OOB activities to promote safety and prevent falls.   White board updated.    Co-evaluation/treatment performed due to patient's multiple deficits requiring two skilled therapists to appropriately and safely assess patient's strength, endurance, functional mobility, and ADL performance while facilitating functional tasks in addition to accommodating for patient's activity tolerance and medical acuity.    Patient left up in chair with all lines intact, call button in reach, and nursing notified    GOALS:   Multidisciplinary Problems       Occupational Therapy Goals          Problem: Occupational Therapy    Goal Priority Disciplines Outcome Interventions   Occupational Therapy Goal     OT, PT/OT Progressing    Description: Goals to be met by: 5/28/25.     Patient will increase functional independence with ADLs by performing:    UE Dressing with Stand-by Assistance.  LE Dressing with Stand-by Assistance.  Grooming while standing at sink with Stand-by Assistance.  Toileting from toilet with Stand-by Assistance for hygiene and clothing management.   Toilet transfer to toilet with Stand-by Assistance.  Upper extremity exercise program x10 reps per handout, with independence.                         DME Justifications:  No DME recommended requiring DME justifications    History:     Past Medical History:   Diagnosis Date    Abdominal wall hernia     CT Renal 6/11/2018---Small fat containing superior ventral abdominal wall hernia at the epicardial pacing lead site.    Abnormal mammogram 10/12/2021    Acute cystitis without hematuria 05/10/2022    Acute ischemic right middle cerebral artery (MCA) stroke 07/20/2024    Adverse drug reaction 11/12/2024    GRACE (acute kidney injury) 11/22/2021    Anxiety     Aphasia 07/19/2024    Arthritis     ZEN HIPS    Breast cancer in female 08/2021    LEFT  BREAST    Breast mass, right 11/13/2024    RIGHT BREAST MASS (Problem)      Bronchitis 08/18/2016    Never smoked      C. difficile colitis 11/29/2021    Cellulitis of axilla, left 12/23/2021    Chronic diastolic heart failure 12/16/2021    Chronic kidney disease     stage 4, GFR 15-29 ml/min    Chronic midline low back pain without sciatica 06/18/2018    CKD (chronic kidney disease) stage 4, GFR 15-29 ml/min 04/20/2016    US Retro   5/16/2018---Mild cortical thinning and increased cortical echogenicity.  Findings can be seen with medical renal disease.  8/31/216--- Echogenic kidneys with diffuse cortical thinning suggesting  medical renal disease. 2 complex right renal cortical cysts.      Closed nondisplaced fracture of distal phalanx of left great toe with routine healing 10/22/2018    Coronary artery disease 1993    heart transplant    COVID-19 in immunocompromised patient 02/26/2024    Cystitis 05/10/2022    Depression     Encounter for blood transfusion     Encounter for palliative care 10/14/2024    Fibromyalgia     on Lyrica    Heart failure     native heart cardiomyopathy    Heart transplanted 1993    due to cardiomyopathy    History of hyperparathyroidism; Hyperparathyroidism, secondary renal     PT DENIES    Hypertension     Immune disorder     anti rejection meds    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    Impaired mobility 07/28/2022    Iron deficiency anemia 08/15/2017    Kidney stones     passed per pt    Obesity     Obesity (BMI 30.0-34.9) 07/22/2019    Other osteoporosis without current pathological fracture 08/30/2019    Other pancytopenia 05/06/2024    Parotitis, acute 09/19/2023    Pharyngitis 01/09/2019    Pneumonia due to infectious organism 03/14/2024    PONV (postoperative nausea and vomiting)     Severe sepsis 11/22/2021    Shingles 2003 approx    left leg    Stage 4 chronic kidney disease 11/22/2021    Subclinical hypothyroidism 06/16/2023    Thrombocytopenia,  unspecified 11/29/2021    Trouble in sleeping     Unresponsiveness 11/13/2024    Urinary incontinence          Past Surgical History:   Procedure Laterality Date    bladder implant Right 06/11/2024    BLADDER SURGERY  2015 approx    mesh - Dr Everett then 2nd reconstructive sx Dr Onofre    BREAST BIOPSY Bilateral     NEGATIVE    BREAST BIOPSY Right 10/31/2022    benign    BREAST LUMPECTOMY Left 2021    BREAST SURGERY Left 09/28/2015    Bx - benign    BREAST SURGERY Right 12/2015    Bx benign    CARDIAC PACEMAKER REMOVAL Left 06/26/2014    Pacer defirillator removed. Put in 1993 aat time of heart transplant    CARPAL TUNNEL RELEASE Left 03/03/2015    Dr. Hall    COLONOSCOPY N/A 02/25/2021    Procedure: COLONOSCOPY;  Surgeon: Freida Ramirez MD;  Location: [facility];  Service: Endoscopy;  Laterality: N/A;    CYSTOCELE REPAIR      Twice with mesh removal    ECHOCARDIOGRAM,TRANSESOPHAGEAL N/A 4/26/2025    Procedure: Transesophageal echo (AYAAN) intra-procedure log documentation;  Surgeon: Barron Chinchilla MD;  Location: [address];  Service: Cardiothoracic;  Laterality: N/A;    EPIDURAL STEROID INJECTION INTO CERVICAL SPINE N/A 02/02/2023    Procedure: T11/T12 IL HELLEN;  Surgeon: Jassi Pierre MD;  Location: [facility]IN [facility];  Service: Pain Management;  Laterality: N/A;    EPIDURAL STEROID INJECTION INTO CERVICAL SPINE N/A 9/16/2024    Procedure: T11/T12 IL HELLEN;  Surgeon: Jassi Pierre MD;  Location: [facility];  Service: Pain Management;  Laterality: N/A;    HEART TRANSPLANT  1993    HERNIA REPAIR Right 1971 approx    Inguinal    HYSTERECTOMY  1983    vag hyst /LSO     IMPLANTATION OF PERMANENT SACRAL NERVE STIMULATOR N/A 06/11/2024    Procedure: INSERTION, NEUROSTIMULATOR, PERMANENT, SACRAL;  Surgeon: Sami Cochran MD;  Location: [facility] OR;  Service: Urology;  Laterality: N/A;    INCISION AND DRAINAGE OF ABSCESS Left 12/24/2021    Procedure: INCISION AND DRAINAGE, ABSCESS;  Surgeon: Joseph  MD Qing;  Location: [facility] OR;  Service: General;  Laterality: Left;    INJECTION OF ANESTHETIC AGENT AROUND MEDIAL BRANCH NERVES INNERVATING LUMBAR FACET JOINT Right 10/19/2022    Procedure: Right L4/L5 and L5/S1 MBB;  Surgeon: Jassi Pierre MD;  Location: [facility]IN [facility];  Service: Pain Management;  Laterality: Right;    INJECTION OF ANESTHETIC AGENT AROUND MEDIAL BRANCH NERVES INNERVATING LUMBAR FACET JOINT Right 11/09/2022    Procedure: Right L4/L5 and L5/S1 MBB;  Surgeon: Jassi Pierre MD;  [facility];  Service: Pain Management;  Laterality: Right;    INJECTION OF ANESTHETIC AGENT AROUND MEDIAL BRANCH NERVES INNERVATING LUMBAR FACET JOINT Left 2/6/2025    Procedure: Left L4-5 and L5-S1 MBB #1 with local;  Surgeon: Jassi Pierre MD;  Location: [facility]IN [facility];  Service: Pain Management;  Laterality: Left;    INJECTION OF ANESTHETIC AGENT AROUND MEDIAL BRANCH NERVES INNERVATING LUMBAR FACET JOINT Left 3/19/2025    Procedure: Left L4-5 and L5-S1 MBB #2 with local;  Surgeon: Jassi Pierre MD;  Location: [facility];  Service: Pain Management;  Laterality: Left;    INJECTION OF ANESTHETIC AGENT INTO SACROILIAC JOINT Right 08/22/2022    Procedure: Right SIJ Injection Right L5/S1 Facte Injection;  Surgeon: Jassi Pierre MD;  Location: [facility]IN [facility];  Service: Pain Management;  Laterality: Right;    INSERTION OF TUNNELED CENTRAL VENOUS CATHETER (CVC) WITH SUBCUTANEOUS PORT N/A 11/09/2021    Procedure: [id number];  Surgeon: Christoph Douglas MD;  Location: [facility] OR;  Service: General;  Laterality: N/A;    RADIOFREQUENCY ABLATION Right 12/5/2024    Procedure: Right L4-5 and L5-S1 RFA;  Surgeon: Jassi Pierre MD;  Location: [facility];  Service: Pain Management;  Laterality: Right;    RADIOFREQUENCY THERMOCOAGULATION Right 12/07/2022    Procedure: Right L4/L5 and L5/S1 Lumbar RFA;  Surgeon: Jassi Pierre MD;  Location: [facility];  Service: Pain Management;   Laterality: Right;    REMOVAL OF ELECTRODE LEAD OF SACRAL NERVE STIMULATOR N/A 2/18/2025    Procedure: REMOVAL, ELECTRODE LEAD, SACRAL NERVE STIMULATOR;  Surgeon: Sami Cochran MD;  Location: [facility] OR;  Service: Urology;  Laterality: N/A;    REMOVAL OF VASCULAR ACCESS PORT      ROBOT-ASSISTED LAPAROSCOPIC ABDOMINAL SACROCOLPOPEXY N/A 08/10/2023    Procedure: ROBOTIC SACROCOLPOPEXY, ABDOMEN;  Surgeon: PRANAY Villalobos MD;  Location: [facility] OR;  Service: OB/GYN;  Laterality: N/A;    ROBOT-ASSISTED LAPAROSCOPIC OOPHORECTOMY Right 08/10/2023    Procedure: ROBOTIC OOPHORECTOMY;  Surgeon: PRANAY Villalobos MD;  Location: [facility] OR;  Service: OB/GYN;  Laterality: Right;    SENTINEL LYMPH NODE BIOPSY Left 10/12/2021    Procedure: BIOPSY, LYMPH NODE, SENTINEL;  Surgeon: Christoph Douglas MD;  Location: [facility] OR;  Service: General;  Laterality: Left;    TOE SURGERY      XI ROBOTIC URETHROPEXY N/A 08/10/2023    Procedure: XI ROBOTIC URETHROPEXY;  Surgeon: PRANAY Villalobos MD;  Location: [facility];  Service: OB/GYN;  Laterality: N/A;       Time Tracking:     OT Date of Treatment: 04/30/25  OT Start Time: 0912  OT Stop Time: 0940  OT Total Time (min): 28 min    Billable Minutes:Evaluation 10 minutes  Self Care/Home Management 18 minutes    4/30/2025

## 2025-04-30 NOTE — PLAN OF CARE
PT evaluation complete and appropriate goals established.    Problem: Physical Therapy  Goal: Physical Therapy Goal  Description: Goals to be met by: 2025     Patient will increase functional independence with mobility by performin. Supine to sit with [place]  2. Sit to supine with [place]  3. Sit to stand transfer with Supervision  4. Gait  x 100 feet with Stand-by Assistance using LRAD .   5. Lower extremity exercise program x15 reps per handout, with independence    Outcome: Progressing     2025

## 2025-04-30 NOTE — PT/OT/SLP EVAL
Physical Therapy Co-Evaluation and Co-Treatment    Patient Name:  Nadia Damon   MRN:  [id number]    Co-evaluation and co-treatment performed for this visit due to suspected patient need for two skilled therapists to ensure patient and staff safety and to accommodate for patient activity tolerance/pain management     Recommendations:     Discharge Recommendations: Low Intensity Therapy  Discharge Equipment Recommendations: none   Barriers to Discharge: None    Assessment:     Nadia Damon is a 69 y.o. 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 She presents with the following impairments/functional limitations: impaired endurance, weakness, pain, decreased safety awareness, impaired functional mobility, impaired self care skills, impaired cardiopulmonary response to activity. Pt presenting with HOB elevated and agreeable to completing therapy evaluation. Pt endorsing significant chest pain secondary to cardiac arrest on 4/27 requiring chest compressions; increased time spent during session cueing patient for appropriate breathing strategies to aid in pain management. Pt requiring limited physical assistance to complete all visualized functional mobility including brief ambulation to bedside chair utilizing SPC. Further ambulation deferred this date 2/2 high pain levels. Recommend low intensity therapy following discharge once medically stable in order to reduce fall risk, reduce caregiver burden, improve quality of life, and return to PLOF. Pt would continue to benefit from skilled acute PT in order to address current deficits and progress functional mobility.     Rehab Prognosis: Good; patient would benefit from acute skilled PT services 4 x/week to address these deficits and reach maximum level of function.  Recent Surgery: Procedure(s) (LRB):  Transesophageal echo (AYAAN) intra-procedure log documentation (N/A) 4 Days Post-Op    Plan:  "    During this hospitalization, patient to be seen 4 x/week to address the identified rehab impairments via gait training, therapeutic activities, therapeutic exercises, neuromuscular re-education and progress toward the following goals:    Plan of Care Expires:  05/30/25    Subjective     Chief Complaint: None verbalized  Patient/Family Comments/Goals: "I've been catching my toes lately when I get tired."  Pain/Comfort:  Pain Rating 1: 10/10  Location - Orientation 1: generalized  Location 1: chest  Pain Addressed 1: Reposition, Distraction, Cessation of Activity, Nurse notified  Pain Rating Post-Intervention 1: 10/10    Patients cultural, spiritual, Alevism conflicts given the current situation: no    Living Environment:  Living Environment: Patient lives in a independent living facility with number of outside stair(s): 0 and tub-shower combo.  Prior Level of Function: Prior to admission, patient modified independent for mobility using SPC and independent for ADLs. Pt reports several falls lately 2/2 catching R toes when fatigued during ambulation.  Equipment Used at Home: cane, straight, walker, rolling, bath bench.  DME owned (not currently used): none  Assistance Upon Discharge: reports excellent community support, but not 24/7    Objective:     Communicated with nursing prior to session. Patient found HOB elevated with blood pressure cuff, pulse ox (continuous), telemetry, PureWick upon PT entry to room.    General Precautions: Standard, fall  Orthopedic Precautions:N/A    Braces: N/A    Exams:  Cognitive Exam:  Patient is oriented to Person, Place, Time, Situation, follows commands 100% of the time  RLE ROM: WFL  RLE Strength: WFL  LLE ROM: WFL  LLE Strength: WFL  Sensation: Intact light touch to BLEs    Functional Mobility:  Bed Mobility:    Supine to Sit: contact guard assistance with increased time 2/2 pain  Transfers:    Sit to Stand: contact guard assistance with straight cane with cues for hand " placement  Verbal cues for increased use of momentum, improved anterior weight shift, and increased BLE motor activation  Gait: Patient ambulated 8' with straight cane and minimal assistance progressing to CGA.   Patient demonstrates occasional unsteady gait, decreased step length, narrow base of support, decreased weight shift, decreased foot clearance, flexed posture, and decreased naif.   Patient required cues for upright posture, sequencing, increased step size, and increased foot clearance  All lines remained intact throughout ambulation trial.  Balance:   Static Sitting: Good, able to maintain for 5 minute(s) with stand by assistance  Dynamic Sitting: Good: Patient accepts moderate challenge, stand by assistance  Static Standing: Good, able to maintain for 2 minute(s) with contact guard assistance  Verbal cues for upright posture, increased hip extension, increased knee extension, and maintenance of midline orientation  Dynamic Standing: Fair: Patient accepts minimal challenge, minimum assistance    Therapeutic Activities and Exercises:  Patient educated on role of acute care PT and PT POC, safety while in hospital including calling nurse for mobility, and call light usage.  Pt educated on the effects of bed rest and the importance of OOB activity. Pt encouraged to sit UIC majority of day as tolerated and continue daily ambulation with nursing assist. Pt verbalized understanding.  Pt educated on importance of maximal participation in therapy session in order to reduce negative effects of prolonged sedentary positioning.   Answered all questions within PT scope of practice and addressed functional mobility concerns.    AM-PAC 6 CLICK MOBILITY  Total Score:17     Patient left up in chair with all lines intact, call button in reach, and RN notified.    GOALS:   Multidisciplinary Problems       Physical Therapy Goals          Problem: Physical Therapy    Goal Priority Disciplines Outcome Interventions    Physical Therapy Goal     PT, PT/OT Progressing    Description: Goals to be met by: 2025     Patient will increase functional independence with mobility by performin. Supine to sit with [place]  2. Sit to supine with [place]  3. Sit to stand transfer with Supervision  4. Gait  x 100 feet with Stand-by Assistance using LRAD .   5. Lower extremity exercise program x15 reps per handout, with independence                         DME Justifications:  No DME recommended requiring DME justifications    History:     Past Medical History:   Diagnosis Date    Abdominal wall hernia     CT Renal 2018---Small fat containing superior ventral abdominal wall hernia at the epicardial pacing lead site.    Abnormal mammogram 10/12/2021    Acute cystitis without hematuria 05/10/2022    Acute ischemic right middle cerebral artery (MCA) stroke 2024    Adverse drug reaction 2024    GRACE (acute kidney injury) 2021    Anxiety     Aphasia 2024    Arthritis     ZEN HIPS    Breast cancer in female 2021    LEFT BREAST    Breast mass, right 2024    RIGHT BREAST MASS (Problem)      Bronchitis 2016    Never smoked      C. difficile colitis 2021    Cellulitis of axilla, left 2021    Chronic diastolic heart failure 2021    Chronic kidney disease     stage 4, GFR 15-29 ml/min    Chronic midline low back pain without sciatica 2018    CKD (chronic kidney disease) stage 4, GFR 15-29 ml/min 2016    US Retro   2018---Mild cortical thinning and increased cortical echogenicity.  Findings can be seen with medical renal disease.  --- Echogenic kidneys with diffuse cortical thinning suggesting  medical renal disease. 2 complex right renal cortical cysts.      Closed nondisplaced fracture of distal phalanx of left great toe with routine healing 10/22/2018    Coronary artery disease 1993    heart transplant    COVID-19 in immunocompromised patient  02/26/2024    Cystitis 05/10/2022    Depression     Encounter for blood transfusion     Encounter for palliative care 10/14/2024    Fibromyalgia     on Lyrica    Heart failure     native heart cardiomyopathy    Heart transplanted 1993    due to cardiomyopathy    History of hyperparathyroidism; Hyperparathyroidism, secondary renal     PT DENIES    Hypertension     Immune disorder     anti rejection meds    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    Impaired mobility 07/28/2022    Iron deficiency anemia 08/15/2017    Kidney stones     passed per pt    Obesity     Obesity (BMI 30.0-34.9) 07/22/2019    Other osteoporosis without current pathological fracture 08/30/2019    Other pancytopenia 05/06/2024    Parotitis, acute 09/19/2023    Pharyngitis 01/09/2019    Pneumonia due to infectious organism 03/14/2024    PONV (postoperative nausea and vomiting)     Severe sepsis 11/22/2021    Shingles 2003 approx    left leg    Stage 4 chronic kidney disease 11/22/2021    Subclinical hypothyroidism 06/16/2023    Thrombocytopenia, unspecified 11/29/2021    Trouble in sleeping     Unresponsiveness 11/13/2024    Urinary incontinence        Past Surgical History:   Procedure Laterality Date    bladder implant Right 06/11/2024    BLADDER SURGERY  2015 approx    mesh - Dr Everett then 2nd reconstructive sx Dr Onofre    BREAST BIOPSY Bilateral     NEGATIVE    BREAST BIOPSY Right 10/31/2022    benign    BREAST LUMPECTOMY Left 2021    BREAST SURGERY Left 09/28/2015    Bx - benign    BREAST SURGERY Right 12/2015    Bx benign    CARDIAC PACEMAKER REMOVAL Left 06/26/2014    Pacer defirillator removed. Put in 1993 aat time of heart transplant    CARPAL TUNNEL RELEASE Left 03/03/2015    Dr. Hall    COLONOSCOPY N/A 02/25/2021    Procedure: COLONOSCOPY;  Surgeon: Freida Ramirez MD;  Location: [facility];  Service: Endoscopy;  Laterality: N/A;    CYSTOCELE REPAIR      Twice with mesh removal     ECHOCARDIOGRAM,TRANSESOPHAGEAL N/A 4/26/2025    Procedure: Transesophageal echo (AYAAN) intra-procedure log documentation;  Surgeon: Barron Chinchilla MD;  Location: [facility] OR [address] [place] [address];  Service: Cardiothoracic;  Laterality: N/A;    EPIDURAL STEROID INJECTION INTO CERVICAL SPINE N/A 02/02/2023    Procedure: T11/T12 IL HELLEN;  Surgeon: Jassi Pierre MD;  Location: [facility] PAIN MGT;  Service: Pain Management;  Laterality: N/A;    EPIDURAL STEROID INJECTION INTO CERVICAL SPINE N/A 9/16/2024    Procedure: T11/T12 IL HELLEN;  Surgeon: Jassi Pierre MD;  Location: [facility] PAIN [facility]GT;  Service: Pain Management;  Laterality: N/A;    HEART TRANSPLANT  1993    HERNIA REPAIR Right 1971 approx    Inguinal    HYSTERECTOMY  1983    vag hyst /LSO     IMPLANTATION OF PERMANENT SACRAL NERVE STIMULATOR N/A 06/11/2024    Procedure: INSERTION, NEUROSTIMULATOR, PERMANENT, SACRAL;  Surgeon: Sami Cochran MD;  Location: [facility] OR;  Service: Urology;  Laterality: N/A;    INCISION AND DRAINAGE OF ABSCESS Left 12/24/2021    Procedure: INCISION AND DRAINAGE, ABSCESS;  Surgeon: Joseph Longo MD;  Location: [facility] OR;  Service: General;  Laterality: Left;    INJECTION OF ANESTHETIC AGENT AROUND MEDIAL BRANCH NERVES INNERVATING LUMBAR FACET JOINT Right 10/19/2022    Procedure: Right L4/L5 and L5/S1 MBB;  Surgeon: Jassi Pierre MD;  Location: [facility] PAIN [facility]GT;  Service: Pain Management;  Laterality: Right;    INJECTION OF ANESTHETIC AGENT AROUND MEDIAL BRANCH NERVES INNERVATING LUMBAR FACET JOINT Right 11/09/2022    Procedure: Right L4/L5 and L5/S1 MBB;  Surgeon: Jassi Pierre MD;  Location: [facility]IN [facility]GT;  Service: Pain Management;  Laterality: Right;    INJECTION OF ANESTHETIC AGENT AROUND MEDIAL BRANCH NERVES INNERVATING LUMBAR FACET JOINT Left 2/6/2025    Procedure: Left L4-5 and L5-S1 MBB #1 with local;  Surgeon: Jassi Pierre MD;  Location: [facility]IN [facility]GT;  Service: Pain Management;  Laterality: Left;    INJECTION OF ANESTHETIC  AGENT AROUND MEDIAL BRANCH NERVES INNERVATING LUMBAR FACET JOINT Left 3/19/2025    Procedure: Left L4-5 and L5-S1 MBB #2 with local;  Surgeon: Jassi Pierre MD;  Location: [facility] PAIN MGT;  Service: Pain Management;  Laterality: Left;    INJECTION OF ANESTHETIC AGENT INTO SACROILIAC JOINT Right 08/22/2022    Procedure: Right SIJ Injection Right L5/S1 Facte Injection;  Surgeon: Jassi Pierre MD;  Location: [facility] PAIN [facility]GT;  Service: Pain Management;  Laterality: Right;    INSERTION OF TUNNELED CENTRAL VENOUS CATHETER (CVC) WITH SUBCUTANEOUS PORT N/A 11/09/2021    Procedure: [id number];  Surgeon: Christoph Douglas MD;  Location: [facility] OR;  Service: General;  Laterality: N/A;    RADIOFREQUENCY ABLATION Right 12/5/2024    Procedure: Right L4-5 and L5-S1 RFA;  Surgeon: Jassi Pierre MD;  Location: [facility] PAIN [facility]GT;  Service: Pain Management;  Laterality: Right;    RADIOFREQUENCY THERMOCOAGULATION Right 12/07/2022    Procedure: Right L4/L5 and L5/S1 Lumbar RFA;  Surgeon: Jassi Pierre MD;  Location: [facility] PAIN [facility]GT;  Service: Pain Management;  Laterality: Right;    REMOVAL OF ELECTRODE LEAD OF SACRAL NERVE STIMULATOR N/A 2/18/2025    Procedure: REMOVAL, ELECTRODE LEAD, SACRAL NERVE STIMULATOR;  Surgeon: Sami Cochran MD;  Location[facility] OR;  Service: Urology;  Laterality: N/A;    REMOVAL OF VASCULAR ACCESS PORT      ROBOT-ASSISTED LAPAROSCOPIC ABDOMINAL SACROCOLPOPEXY N/A 08/10/2023    Procedure: ROBOTIC SACROCOLPOPEXY, ABDOMEN;  Surgeon: PRANAY Villalobos MD;  Location: [facility] OR;  Service: OB/GYN;  Laterality: N/A;    ROBOT-ASSISTED LAPAROSCOPIC OOPHORECTOMY Right 08/10/2023    Procedure: ROBOTIC OOPHORECTOMY;  Surgeon: PRANAY Villalobos MD;  Location: [facility] OR;  Service: OB/GYN;  Laterality: Right;    SENTINEL LYMPH NODE BIOPSY Left 10/12/2021    Procedure: BIOPSY, LYMPH NODE, SENTINEL;  Surgeon: Christoph Douglas MD;  Location: [facility] OR;  Service: General;  Laterality: Left;    TOE SURGERY      XI  ROBOTIC URETHROPEXY N/A 08/10/2023    Procedure: XI ROBOTIC URETHROPEXY;  Surgeon: PRANAY Villalobos MD;  Location: [facility];  Service: OB/GYN;  Laterality: N/A;       Time Tracking:     PT Received On: 04/30/25  PT Start Time: 0912     PT Stop Time: 0938  PT Total Time (min): 26 min     Billable Minutes: Evaluation 10 Therapeutic Activity 16      04/30/2025

## 2025-04-30 NOTE — PLAN OF CARE
Problem: Occupational Therapy  Goal: Occupational Therapy Goal  Description: Goals to be met by: 5/28/25.     Patient will increase functional independence with ADLs by performing:    UE Dressing with Stand-by Assistance.  LE Dressing with Stand-by Assistance.  Grooming while standing at sink with Stand-by Assistance.  Toileting from toilet with Stand-by Assistance for hygiene and clothing management.   Toilet transfer to toilet with Stand-by Assistance.  Upper extremity exercise program x10 reps per handout, with independence.    Outcome: Progressing     OT evaluation completed and goals established.

## 2025-04-30 NOTE — EICU
Intervention Initiated From:  COR / EICU    Diana intervened regarding:  Rounding (Video assessment)    Virtual ICU Quality Rounds    Admit Date: 4/24/2025  Hospital Day: 5    ICU Day: 3d    24H Vital Sign Range:  Temp:  [96.8 °F (36 °C)-98.4 °F (36.9 °C)]   Pulse:  [66-98]   Resp:  [16-46]   BP: (116-176)/()   SpO2:  [93 %-100 %]     VICU Surveillance Screening    LDA reconciliation : Yes

## 2025-04-30 NOTE — PROGRESS NOTES
Update     Met with pt in room as a follow-up. Pt was AAOx4 with pleasant affect. Pt was sitting up in chair. Pt c/o pain from chest compression. Pt informed SW that medical team discussed NHP with her. Educated pt on LOC at [facility]. Informed pt that SNF placement will be based on therapy recs. Pt stated that she was informed she will receive therapy when she transferred to the floor. Provided pt with NH list and will f/u with pt regarding NH choices. Pt voiced understanding of plan of care. Pt reports coping well and denies further needs, questions, concerns at this time and none indicated. Providing ongoing psychosocial and counseling support, education, resources, assistance and discharge planning as indicated. Following and available.    14:25   F/u with pt regarding choices of NH SNFs. At this time, pt's friends (Anne and Jessy) were in attendance visiting with pt. Pt was still sitting up in chair. Pt requested that SW provide her friend Anne with list to review and assist her in choosing NHs. Pt chose [facility] ([phone]), [facility] ([phone]), and [facility] ([phone]), respectively. Pt provided verbal consent for SW to complete referrals at above [facility]. PASRR completed via [employer]. Awaiting therapy notes to complete referrals and 142. Following and available.     15:32  Therapy notes documented. Referrals completed at [facility], [facility], and [facility] and provided required documents via Pins in Basket. Will continue to f/u with [facility] regarding decisions. Awaiting 142.

## 2025-04-30 NOTE — SUBJECTIVE & OBJECTIVE
Interval History: Patient reports feeling little better today.  Her only complaint is chest pain from chest compressions.  Had long discussion with her yesterday and again today.  She wishes to change her code status to DNR.  Palliative care consulted and planning to visit with her and discuss goals of care later today.  Her renal function is slightly up today.  On Ceftriaxone but unclear why.  No signs or symptoms of infection so will discontinue today.  Will transfer her to the floor today.       Continuous Infusions:   dexmedeTOMIDine (Precedex) infusion (titrating)  0-1.4 mcg/kg/hr Intravenous Continuous   Stopped at 04/29/25 0949    nicardipine  0-15 mg/hr Intravenous Continuous   Stopped at 04/28/25 2307     Scheduled Meds:   aluminum-magnesium hydroxide-simethicone  30 mL Oral QID (AC & HS)    aspirin  81 mg Oral Daily    calcitRIOL  0.25 mcg Oral Daily    carvediloL  3.125 mg Oral BID WM    cycloSPORINE  50 mg Oral BID    enoxparin  30 mg Subcutaneous Q24H (prophylaxis, 1700)    NIFEdipine  60 mg Oral Daily    [START ON 5/1/2025] pantoprazole  40 mg Oral Daily    potassium bicarbonate  30 mEq Per OG tube Once    [START ON 5/1/2025] pregabalin  50 mg Oral QHS    sertraline  25 mg Oral Daily    sucralfate  1 g Oral Q6H    tiZANidine  4 mg Oral BID     PRN Meds:  Current Facility-Administered Medications:     acetaminophen, 650 mg, Oral, Q4H PRN    aluminum-magnesium hydroxide-simethicone, 30 mL, Oral, Q6H PRN    morphine, 2 mg, Intravenous, Q4H PRN    ondansetron, 8 mg, Oral, Q8H PRN    oxyCODONE, 5 mg, Oral, Q4H PRN    polyethylene glycol, 17 g, Oral, BID PRN    racepinephrine, 0.5 mL, Nebulization, Q4H PRN    sodium chloride 0.9%, 10 mL, Intravenous, PRN    zolpidem, 5 mg, Oral, Nightly PRN    Review of patient's allergies indicates:   Allergen Reactions    Dobutamine Other (See Comments)     Severe Left sided chest pain after dosage administered for Dobutamine Stress Test; itching    Lisinopril Swelling and  Rash    Hydrocodone-acetaminophen Nausea Only    Augmentin [amoxicillin-pot clavulanate] Diarrhea    Zyvox [linezolid] Nausea And Vomiting     Objective:     Vital Signs (Most Recent):  Temp: 98.6 °F (37 °C) (04/30/25 0702)  Pulse: 86 (04/30/25 1100)  Resp: (!) 23 (04/30/25 1100)  BP: (!) 166/85 (04/30/25 1100)  SpO2: 98 % (04/30/25 1100) Vital Signs (24h Range):  Temp:  [96.8 °F (36 °C)-98.6 °F (37 °C)] 98.6 °F (37 °C)  Pulse:  [67-98] 86  Resp:  [17-46] 23  SpO2:  [93 %-100 %] 98 %  BP: (116-184)/() 166/85     Patient Vitals for the past 72 hrs (Last 3 readings):   Weight   04/29/25 0301 70 kg (154 lb 5.2 oz)   04/28/25 1030 69 kg (152 lb 1.9 oz)   04/28/25 0701 69.3 kg (152 lb 12.5 oz)     Body mass index is 28.23 kg/m².      Intake/Output Summary (Last 24 hours) at 4/30/2025 1140  Last data filed at 4/30/2025 0900  Gross per 24 hour   Intake 1350 ml   Output 1080 ml   Net 270 ml          Physical Exam  Constitutional:       General: She is not in acute distress.     Appearance: Normal appearance. She is not ill-appearing.   HENT:      Head: Normocephalic and atraumatic.   Cardiovascular:      Rate and Rhythm: Normal rate and regular rhythm.      Pulses: Normal pulses.      Heart sounds: Normal heart sounds.   Pulmonary:      Effort: Pulmonary effort is normal.      Breath sounds: Normal breath sounds.   Chest:      Comments: L ribcage tender to touch  Abdominal:      General: Abdomen is flat. There is no distension.      Palpations: Abdomen is soft.      Tenderness: There is no abdominal tenderness.   Musculoskeletal:      Right lower leg: No edema.      Left lower leg: No edema.   Skin:     General: Skin is warm and dry.   Neurological:      General: No focal deficit present.      Mental Status: She is alert and oriented to person, place, and time.   Psychiatric:         Mood and Affect: Mood normal.         Behavior: Behavior normal.            Significant Labs:  CBC:  Recent Labs   Lab 04/28/25  5137  "04/29/25  0356 04/30/25  0435   WBC 3.55* 3.95 5.28   RBC 3.68* 3.64* 2.98*   HGB 10.9* 10.5* 8.7*   HCT 32.9* 33.3* 27.0*   * 170 149*   MCV 89 92 91   MCH 29.6 28.8 29.2   MCHC 33.1 31.5* 32.2     BNP:  Recent Labs   Lab 04/24/25  1343   *     CMP:  Recent Labs   Lab 04/28/25  0406 04/29/25  0356 04/30/25  0435   GLU 86 104 109   CALCIUM 8.6* 8.7 8.3*   ALBUMIN 3.2* 3.1* 2.9*   PROT 8.4 8.2 7.1    143 141   K 4.5 4.4 4.6   CO2 21* 20* 15*    107 109   BUN 31* 36* 57*   CREATININE 2.8* 2.6* 3.5*   ALKPHOS 127 130 117   * 331* 208*   * 410* 175*   BILITOT 0.5 0.5 0.4      Coagulation:   Recent Labs   Lab 04/27/25  0747   INR 1.0   APTT 22.5     LDH:  No results for input(s): "LDH" in the last 72 hours.  Microbiology:  Microbiology Results (last 7 days)       Procedure Component Value Units Date/Time    Blood culture [[id number]]  (Normal) Collected: 04/27/25 0923    Order Status: Completed Specimen: Blood from Peripheral, Antecubital, Right Updated: 04/30/25 1100     Blood Culture No Growth After 72 Hours    Blood culture [[id number]]  (Normal) Collected: 04/27/25 0942    Order Status: Completed Specimen: Blood from Peripheral, Antecubital, Left Updated: 04/30/25 1100     Blood Culture No Growth After 72 Hours    Urine culture [[id number]]  (Abnormal) Collected: 04/27/25 0845    Order Status: Completed Specimen: Urine, Catheterized Updated: 04/29/25 1837     Urine Culture >100,000 cfu/ml Enterococcus species     Comment: Identification and susceptibility pending       Blood culture [[id number]]  (Normal) Collected: 04/25/25 0319    Order Status: Completed Specimen: Blood Updated: 04/29/25 1801     Blood Culture No Growth After 96 hours    Blood culture [[id number]]  (Normal) Collected: 04/25/25 0324    Order Status: Completed Specimen: Blood Updated: 04/29/25 1801     Blood Culture No Growth After 96 hours            I have reviewed all pertinent labs within the past 24 " hours.    Estimated Creatinine Clearance: 13.7 mL/min (A) (based on SCr of 3.5 mg/dL (H)).    Diagnostic Results:  I have reviewed all pertinent imaging results/findings within the past 24 hours.

## 2025-04-30 NOTE — PLAN OF CARE
CICU DAILY GOALS     Family/Goals of care/Code Status   Code Status: Full Code     NAEON, VS and assessment per flow sheet, patient progressing towards goals as tolerated, plan of care reviewed with Nadia Damon and family, all concerns addressed    Swain removed overnight- voided good via purewick  Pt w/co chest pain x1- dr ta aware, nitro given w/relief  C/o pain to bilat ribs, treated per orders    A: Awake    RASS: Goal -    Actual - RASS (Robledo Agitation-Sedation Scale): alert and calm   Restraint necessity: Clinical Justification: Treatment Interference  B: Breath   SBT: Not intubated   C: Coordinate A & B, analgesics/sedatives   Pain: managed    SAT: Not intubated  D: Delirium   CAM-ICU:    E: Early(intubated/ Progressive (non-intubated) Mobility   MOVE Screen: Pass   Activity: Activity Management: Ambulated in room - L4  FAS: Feeding/Nutrition   Diet order: Diet/Nutrition Received: full liquid,  Boost  T: Thrombus   DVT prophylaxis: VTE Core Measure: Pharmacological prophylaxis initiated/maintained  H: HOB Elevation   Head of Bed (HOB) Positioning: HOB elevated  U: Ulcer Prophylaxis   GI: yes  G: Glucose control   managed    S: Skin   Bathing/Skin Care: bath, complete (04/30/25 0301)  Wounds: No  Wound care consulted: No  B: Bowel Function   no issues   I: Indwelling Catheters   Swain necessity: [REMOVED]      Urethral Catheter 04/27/25 0800-Reason for Continuing Urinary Catheterization: Critically ill in ICU and requiring hourly monitoring of intake/output   CVC necessity: No  D: De-escalation Antibx   Yes

## 2025-04-30 NOTE — EICU
Intervention Initiated From:  COR / ANAMARIAU    Diana intervened regarding:  Rounding (Video assessment)    VICU Night Rounds Checklist  24H Vital Sign Range:  Temp:  [96.8 °F (36 °C)-99.1 °F (37.3 °C)]   Pulse:  [63-89]   Resp:  [14-31]   BP: (116-183)/()   SpO2:  [90 %-100 %]     Video rounds and LDA reconciliation

## 2025-04-30 NOTE — NURSING
Rounds Report: Attended interdisciplinary rounds this afternoon with the transplant team including SW, physicians, fellows,  mid-level providers, and transplant coordinators.  Discussed plan of care, including DC date, current medications including CYA and current plan to proceed with Skilled nursing unit placement.  I was able to meet with the pt today.

## 2025-04-30 NOTE — PLAN OF CARE
CICU DAILY GOALS     Continued chest/rib pain throughout shift. MD aware  Step down orders  2 BM today    A: Awake    RASS: Goal -    Actual - RASS (Robledo Agitation-Sedation Scale): alert and calm   Restraint necessity: Clinical Justification: Treatment Interference  B: Breath   SBT: Not intubated   C: Coordinate A & B, analgesics/sedatives   Pain: managed    SAT: Not intubated  D: Delirium   CAM-ICU:    E: Early(intubated/ Progressive (non-intubated) Mobility   MOVE Screen: Pass   Activity: Activity Management: Rolling - L1  FAS: Feeding/Nutrition   Diet order: Diet/Nutrition Received: full liquid,   Fluid restriction:     Nutritional Supplement Intake: Quantity , Type:  n/a  T: Thrombus   DVT prophylaxis: VTE Core Measure: Pharmacological prophylaxis initiated/maintained  H: HOB Elevation   Head of Bed (HOB) Positioning: HOB elevated  U: Ulcer Prophylaxis   GI: yes  G: Glucose control   managed    S: Skin   Bathing/Skin Care: bath, complete (04/30/25 0301)  Wounds: No  Wound care consulted: No  B: Bowel Function   no issues   I: Indwelling Catheters   Swain necessity: [REMOVED]      Urethral Catheter 04/27/25 0800-Reason for Continuing Urinary Catheterization: Critically ill in ICU and requiring hourly monitoring of intake/output   CVC necessity: No  D: De-escalation Antibx   No  Plan for the day     Family/Goals of care/Code Status   Code Status: DNR     No acute events throughout day, VS and assessment per flow sheet, patient progressing towards goals as tolerated, plan of care reviewed with Nadia Damon and family, all concerns addressed

## 2025-04-30 NOTE — PROGRESS NOTES
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  Her only complaint is chest pain from chest compressions.  Had long discussion with her yesterday and again today.  She wishes to change her code status to DNR.  Palliative care consulted and planning to visit with her and discuss goals of care later today.  Her renal function is slightly up today.  On Ceftriaxone but unclear why.  No signs or symptoms of infection so will discontinue today.  Will transfer her to the floor today.       Continuous Infusions:   dexmedeTOMIDine (Precedex) infusion (titrating)  0-1.4 mcg/kg/hr Intravenous Continuous   Stopped at 04/29/25 0949    nicardipine  0-15 mg/hr Intravenous Continuous   Stopped at 04/28/25 2307     Scheduled Meds:   aluminum-magnesium hydroxide-simethicone  30 mL Oral QID (AC & HS)    aspirin  81 mg Oral Daily    calcitRIOL  0.25 mcg Oral Daily    carvediloL  3.125 mg Oral BID WM    cycloSPORINE  50 mg Oral BID    enoxparin  30 mg Subcutaneous Q24H (prophylaxis, 1700)    NIFEdipine  60 mg Oral Daily    [START ON 5/1/2025] pantoprazole  40 mg Oral Daily    potassium bicarbonate  30 mEq Per OG tube Once    [START ON 5/1/2025] pregabalin  50 mg Oral QHS    sertraline  25 mg Oral Daily    sucralfate  1 g Oral Q6H    tiZANidine  4 mg Oral BID     PRN Meds:  Current Facility-Administered Medications:     acetaminophen, 650 mg, Oral, Q4H PRN    aluminum-magnesium hydroxide-simethicone, 30 mL, Oral, Q6H PRN    morphine, 2 mg, Intravenous, Q4H PRN    ondansetron, 8 mg, Oral, Q8H PRN    oxyCODONE, 5 mg, Oral, Q4H PRN    polyethylene glycol, 17  g, Oral, BID PRN    racepinephrine, 0.5 mL, Nebulization, Q4H PRN    sodium chloride 0.9%, 10 mL, Intravenous, PRN    zolpidem, 5 mg, Oral, Nightly PRN    Review of patient's allergies indicates:   Allergen Reactions    Dobutamine Other (See Comments)     Severe Left sided chest pain after dosage administered for Dobutamine Stress Test; itching    Lisinopril Swelling and Rash    Hydrocodone-acetaminophen Nausea Only    Augmentin [amoxicillin-pot clavulanate] Diarrhea    Zyvox [linezolid] Nausea And Vomiting     Objective:     Vital Signs (Most Recent):  Temp: 98.6 °F (37 °C) (04/30/25 0702)  Pulse: 86 (04/30/25 1100)  Resp: (!) 23 (04/30/25 1100)  BP: (!) 166/85 (04/30/25 1100)  SpO2: 98 % (04/30/25 1100) Vital Signs (24h Range):  Temp:  [96.8 °F (36 °C)-98.6 °F (37 °C)] 98.6 °F (37 °C)  Pulse:  [67-98] 86  Resp:  [17-46] 23  SpO2:  [93 %-100 %] 98 %  BP: (116-184)/() 166/85     Patient Vitals for the past 72 hrs (Last 3 readings):   Weight   04/29/25 0301 70 kg (154 lb 5.2 oz)   04/28/25 1030 69 kg (152 lb 1.9 oz)   04/28/25 0701 69.3 kg (152 lb 12.5 oz)     Body mass index is 28.23 kg/m².      Intake/Output Summary (Last 24 hours) at 4/30/2025 1140  Last data filed at 4/30/2025 0900  Gross per 24 hour   Intake 1350 ml   Output 1080 ml   Net 270 ml          Physical Exam  Constitutional:       General: She is not in acute distress.     Appearance: Normal appearance. She is not ill-appearing.   HENT:      Head: Normocephalic and atraumatic.   Cardiovascular:      Rate and Rhythm: Normal rate and regular rhythm.      Pulses: Normal pulses.      Heart sounds: Normal heart sounds.   Pulmonary:      Effort: Pulmonary effort is normal.      Breath sounds: Normal breath sounds.   Chest:      Comments: L ribcage tender to touch  Abdominal:      General: Abdomen is flat. There is no distension.      Palpations: Abdomen is soft.      Tenderness: There is no abdominal tenderness.   Musculoskeletal:      Right lower leg:  "No edema.      Left lower leg: No edema.   Skin:     General: Skin is warm and dry.   Neurological:      General: No focal deficit present.      Mental Status: She is alert and oriented to person, place, and time.   Psychiatric:         Mood and Affect: Mood normal.         Behavior: Behavior normal.            Significant Labs:  CBC:  Recent Labs   Lab 04/28/25  0406 04/29/25  0356 04/30/25  0435   WBC 3.55* 3.95 5.28   RBC 3.68* 3.64* 2.98*   HGB 10.9* 10.5* 8.7*   HCT 32.9* 33.3* 27.0*   * 170 149*   MCV 89 92 91   MCH 29.6 28.8 29.2   MCHC 33.1 31.5* 32.2     BNP:  Recent Labs   Lab 04/24/25  1343   *     CMP:  Recent Labs   Lab 04/28/25  0406 04/29/25  0356 04/30/25  0435   GLU 86 104 109   CALCIUM 8.6* 8.7 8.3*   ALBUMIN 3.2* 3.1* 2.9*   PROT 8.4 8.2 7.1    143 141   K 4.5 4.4 4.6   CO2 21* 20* 15*    107 109   BUN 31* 36* 57*   CREATININE 2.8* 2.6* 3.5*   ALKPHOS 127 130 117   * 331* 208*   * 410* 175*   BILITOT 0.5 0.5 0.4      Coagulation:   Recent Labs   Lab 04/27/25  0747   INR 1.0   APTT 22.5     LDH:  No results for input(s): "LDH" in the last 72 hours.  Microbiology:  Microbiology Results (last 7 days)       Procedure Component Value Units Date/Time    Blood culture [[id number]]  (Normal) Collected: 04/27/25 0923    Order Status: Completed Specimen: Blood from Peripheral, Antecubital, Right Updated: 04/30/25 1100     Blood Culture No Growth After 72 Hours    Blood culture [[id number]]  (Normal) Collected: 04/27/25 0942    Order Status: Completed Specimen: Blood from Peripheral, Antecubital, Left Updated: 04/30/25 1100     Blood Culture No Growth After 72 Hours    Urine culture [[id number]]  (Abnormal) Collected: 04/27/25 0845    Order Status: Completed Specimen: Urine, Catheterized Updated: 04/29/25 1837     Urine Culture >100,000 cfu/ml Enterococcus species     Comment: Identification and susceptibility pending       Blood culture [[id number]]  (Normal) " Collected: 04/25/25 0319    Order Status: Completed Specimen: Blood Updated: 04/29/25 1801     Blood Culture No Growth After 96 hours    Blood culture [[id number]]  (Normal) Collected: 04/25/25 0324    Order Status: Completed Specimen: Blood Updated: 04/29/25 1801     Blood Culture No Growth After 96 hours            I have reviewed all pertinent labs within the past 24 hours.    Estimated Creatinine Clearance: 13.7 mL/min (A) (based on SCr of 3.5 mg/dL (H)).    Diagnostic Results:  I have reviewed all pertinent imaging results/findings within the past 24 hours.  Assessment and Plan:     Ms. Damon is a 68 y/o AAF s/p OHTx 5/27/93 at [facility], s/p PPM same date, mild anemia and leukopenia, OA, cervical spine DJD with radiculopathy and chronic neck pain who presents for her 31st post annual transplant evaluation. Four years ago after her annual was found to have BRCA, underwent excision, chemo/radiation, had a rough time of it, with some complications related to it, however recovered well.      Transferred from [facility] ED due to chest pain. She initially was at the [place] for pain management procedure for chronic back pain. In preprocedure area complained of nausea and chest pain having taken a sublingual nitro prior to arrival. Reported intermittent chest pain > 1 week. Took all regular medications on an empty stomach and per notes had n/v after starting IV. ECG with repol abnormalities. Noted to be hypokalemic and hypomagnesemic. Troponin I mildly elevated.     Off note, a few months ago was evaluated by [facility] for a reducible ventral hernia containing portion of the liver no bowel noted on exam or imaging. At that time also reported chronic nausea. Elective hernia repair deemed not emergent and if she should require intervention to be done at center with cardiac surgery and Cardiology heart failure services.      Cardiac PET 11/15/2024    The myocardial perfusion images show no evidence of ischemia or scar.    The  whole heart absolute myocardial perfusion values were elevated at rest, low normal during stress and CFR is mildly reduced in part due to elevated resting flow.    CT attenuation images demonstrate no coronary calcifications and mild diffuse aortic calcifications in the ascending aorta, in the descending aorta and moderate calcfications in the aortic arch.    The gated perfusion images showed an ejection fraction of 56% at rest and 59% during stress. A normal ejection fraction is greater than 47%.    There is normal wall motion at rest and normal wall motion during stress.    The study's ECG is negative for ischemia.       TTE 07/20/2024:    Left Ventricle: The left ventricle is normal in size. Normal wall thickness. There is normal systolic function with a visually estimated ejection fraction of 55 - 60%. There is normal diastolic function.    Right Ventricle: Normal right ventricular cavity size. Wall thickness is normal. Systolic function is normal.    Left Atrium: Patent foramen ovale visualized with predominant right to left shunting indicated by saline contrast.    Tricuspid Valve: There is mild regurgitation.    IVC/SVC: Normal venous pressure at 3 mmHg.    The patient is status post cardiac transplantation.  PFO not present with bubble alone, but with valsalva had very small bubbles come across         Cxr: Comparison is made to January 4, 2023.  Electrical lead overlies the lower chest and upper abdomen.  Surgical clips overlie the upper abdomen.  Mediastinal silhouette is prominent.  The lungs are clear.  No pneumothorax or significant pleural effusion     DSE 05/24/21:  The left ventricle is normal in size with concentric remodeling and normal systolic function.  The patient reached the end of the protocol.  There were no arrhythmias during stress.  Severe left atrial enlargement.  The estimated ejection fraction is 55%.  Grade II left ventricular diastolic dysfunction.  Normal right ventricular size with  normal right ventricular systolic function.  Mild-to-moderate mitral regurgitation.  Mild to moderate tricuspid regurgitation.  Normal central venous pressure (3 mmHg).  The estimated PA systolic pressure is 39 mmHg.  The stress echo portion of this study is negative for myocardial ischemia.  Severe left atrial enlargement.  The ECG portion of this study is negative for myocardial ischemia.    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   -Last PET stress 11/2024  negative for ischemia  - Both TTE and AYAAN done.   -2D echo 4/28/25 EF: 45%, global hypokinesis, diastolic dysfunction.  RV function moderately reduced.  Mild to Mod TR. LVEDD: 4cm  - not currently candidate for angiogram given renal dysfunction  -ASA    S/P orthotopic heart transplant  -Transplant Date 1993 At [facility]   -CMV Status:D?R+   -Rejection status: Moderate Risk  -Last HLA/DSA 7/2/24 week DQ7  -2D echo 4/28/25 EF: 45%, global hypokinesis, diastolic dysfunction.  RV function moderately reduced.  Mild to Mod TR. LVEDD: 4cm  -Immunosuppression: cyclosporine with goal     Cardiac arrest  -Unclear etiology but suspect possible CAV as cause   -Patient now starting to wake up and follow commands, CTH w/ no acute findings  -Was intubated for airway protection,   -Pulm consulted.  Corticosteroids given overnight and patient extubated 4/29  -patient made DNR on 4/30    CKD (chronic kidney disease), stage IV  -Baseline creatinine ~3     Essential hypertension  -Will monitor on current regimen     Anemia associated with stage 5 chronic renal failure  -H/H stable  -No signs of bleeding    Immunocompromised state due to drug therapy  S/p heart transplant    Chest pain  -Multifactorial now possibly secondary to cardiac arrest     Chronic idiopathic constipation  - prn  laxatives    Hypomagnesemia  -replacing Mag and monitor renal function    Hypokalemia  -replacing electrolytes as needed while monitoring renal function    Anemia  Stable, no acute signs of bleeding    Chronic right-sided thoracic back pain  -continue home medications    Abnormal serum thyroid stimulating hormone (TSH) level  -repeating thyroid panel        BARON Chao  Heart Transplant  [facility] - Cardiac Intensive Care

## 2025-04-30 NOTE — PLAN OF CARE
"Cardiac ICU Care Plan    POC reviewed with Nadia Damon and family. Patient extubated today to room air. Patient with complaints of chest pain/pressure this afternoon, relieved with nitro and Morphine. Plan for possible nitro gtt if chest pressure to come back. Family updated via phone multiple times today.     Neuro:  Meka Coma Scale  Best Eye Response: 4-->(E4) spontaneous  Best Motor Response: 6-->(M6) obeys commands  Best Verbal Response: 5-->(V5) oriented  Hudson Coma Scale Score: 15  Assessment Qualifiers: patient intubated  Pupil PERRLA: yes    24 hr Temp:  [96.8 °F (36 °C)-99.1 °F (37.3 °C)]      CV:  Rhythm: normal sinus rhythm   DVT prophylaxis: VTE Core Measure: Pharmacological prophylaxis initiated/maintained  Pulses  Right Radial Pulse: 2+ (normal)  Left Radial Pulse: 2+ (normal)  Right Dorsalis Pedis Pulse: 2+ (normal)  Left Dorsalis Pedis Pulse: 2+ (normal)  Right Posterior Tibial Pulse: 1+ (weak)  Left Posterior Tibial Pulse: 1+ (weak)    Resp:  Flow (L/min) (Oxygen Therapy): 2  Vent Mode: Spont  Set Rate: 18 BPM  Oxygen Concentration (%): 30  Vt Set: 430 mL  PEEP/CPAP: 5 cmH20  Pressure Support: 10 cmH20    GI/:  GI prophylaxis: yes  Diet/Nutrition Received: NPO  Last Bowel Movement: 04/29/25  Voiding Characteristics: ureteral catheter       Urethral Catheter 04/27/25 0800-Reason for Continuing Urinary Catheterization: Chronic Indwelling Urinary Catheter on Admission   Labs/Accuchecks:  Recent Labs   Lab 04/27/25  0747 04/28/25  0406 04/29/25  0356   WBC 2.32* 3.55* 3.95   RBC 3.30* 3.68* 3.64*   HGB 9.6* 10.9* 10.5*   HCT 30.0* 32.9* 33.3*   * 149* 170     Recent Labs   Lab 04/27/25  0747   INR 1.0   APTT 22.5      Recent Labs     04/29/25  0356      K 4.4   CO2 20*      BUN 36*   CREATININE 2.6*   ALKPHOS 130   *   *   BILITOT 0.5       Recent Labs   Lab 04/24/25  1343   *   TROPONINI 0.034*    No results for input(s): "PH", "PCO2", "PO2", "HCO3", " ""POCSATURATED", "BE" in the last 72 hours.    Electrolytes: Electrolytes replaced  Accuchecks: none    Gtts/LDAs:   dexmedeTOMIDine (Precedex) infusion (titrating)  0-1.4 mcg/kg/hr Intravenous Continuous   Stopped at 04/29/25 0949    nicardipine  0-15 mg/hr Intravenous Continuous   Stopped at 04/28/25 2307       Lines/Drains/Airways       Drain  Duration                  NG/OG Tube 04/27/25 0900 Center mouth 2 days         Urethral Catheter 04/27/25 0800 2 days              Peripheral Intravenous Line  Duration                  Peripheral IV - Single Lumen 04/25/25 1043 20 G 1 3/4 in Anterior;Right Upper Arm 4 days         Peripheral IV - Single Lumen 04/27/25 0854 20 G Anterior;Left Forearm 2 days                    Skin/Wounds  Bathing/Skin Care: bath, complete (04/29/25 0401)  Wounds: No  Wound care consulted: No   Problem: Adult Inpatient Plan of Care  Goal: Plan of Care Review  Outcome: Progressing     Problem: Fall Injury Risk  Goal: Absence of Fall and Fall-Related Injury  Outcome: Progressing     "

## 2025-04-30 NOTE — ASSESSMENT & PLAN NOTE
-Unclear etiology but suspect possible CAV as cause   -Patient now starting to wake up and follow commands, CTH w/ no acute findings  -Was intubated for airway protection,   -Pulm consulted.  Corticosteroids given overnight and patient extubated 4/29  -patient made DNR on 4/30

## 2025-05-01 LAB
ABSOLUTE EOSINOPHIL (OHS): 0.01 K/UL
ABSOLUTE MONOCYTE (OHS): 0.38 K/UL (ref 0.3–1)
ABSOLUTE NEUTROPHIL COUNT (OHS): 2.39 K/UL (ref 1.8–7.7)
ALBUMIN SERPL BCP-MCNC: 3.1 G/DL (ref 3.5–5.2)
ALP SERPL-CCNC: 127 UNIT/L (ref 40–150)
ALT SERPL W/O P-5'-P-CCNC: 158 UNIT/L (ref 10–44)
ANION GAP (OHS): 14 MMOL/L (ref 8–16)
AST SERPL-CCNC: 99 UNIT/L (ref 11–45)
BACTERIA UR CULT: ABNORMAL
BASOPHILS # BLD AUTO: 0.01 K/UL
BASOPHILS NFR BLD AUTO: 0.3 %
BILIRUB SERPL-MCNC: 0.5 MG/DL (ref 0.1–1)
BUN SERPL-MCNC: 55 MG/DL (ref 8–23)
CALCIUM SERPL-MCNC: 8.5 MG/DL (ref 8.7–10.5)
CHLORIDE SERPL-SCNC: 109 MMOL/L (ref 95–110)
CO2 SERPL-SCNC: 20 MMOL/L (ref 23–29)
CREAT SERPL-MCNC: 3.4 MG/DL (ref 0.5–1.4)
CYCLOSPORINE BLD LC/MS/MS-MCNC: 125 NG/ML (ref 100–400)
ERYTHROCYTE [DISTWIDTH] IN BLOOD BY AUTOMATED COUNT: 15.8 % (ref 11.5–14.5)
GFR SERPLBLD CREATININE-BSD FMLA CKD-EPI: 14 ML/MIN/1.73/M2
GLUCOSE SERPL-MCNC: 107 MG/DL (ref 70–110)
HCT VFR BLD AUTO: 28.2 % (ref 37–48.5)
HGB BLD-MCNC: 9.2 GM/DL (ref 12–16)
IMM GRANULOCYTES # BLD AUTO: 0.05 K/UL (ref 0–0.04)
IMM GRANULOCYTES NFR BLD AUTO: 1.4 % (ref 0–0.5)
LYMPHOCYTES # BLD AUTO: 0.67 K/UL (ref 1–4.8)
MAGNESIUM SERPL-MCNC: 3.2 MG/DL (ref 1.6–2.6)
MCH RBC QN AUTO: 28.8 PG (ref 27–31)
MCHC RBC AUTO-ENTMCNC: 32.6 G/DL (ref 32–36)
MCV RBC AUTO: 88 FL (ref 82–98)
NUCLEATED RBC (/100WBC) (OHS): 0 /100 WBC
PHOSPHATE SERPL-MCNC: 2.3 MG/DL (ref 2.7–4.5)
PLATELET # BLD AUTO: 169 K/UL (ref 150–450)
PMV BLD AUTO: 10.5 FL (ref 9.2–12.9)
POTASSIUM SERPL-SCNC: 4.1 MMOL/L (ref 3.5–5.1)
PROT SERPL-MCNC: 7.7 GM/DL (ref 6–8.4)
RBC # BLD AUTO: 3.2 M/UL (ref 4–5.4)
RELATIVE EOSINOPHIL (OHS): 0.3 %
RELATIVE LYMPHOCYTE (OHS): 19.1 % (ref 18–48)
RELATIVE MONOCYTE (OHS): 10.8 % (ref 4–15)
RELATIVE NEUTROPHIL (OHS): 68.1 % (ref 38–73)
SODIUM SERPL-SCNC: 143 MMOL/L (ref 136–145)
WBC # BLD AUTO: 3.51 K/UL (ref 3.9–12.7)

## 2025-05-01 PROCEDURE — 99233 SBSQ HOSP IP/OBS HIGH 50: CPT | Mod: HCNC,25,,

## 2025-05-01 PROCEDURE — 25000003 PHARM REV CODE 250: Mod: HCNC | Performed by: INTERNAL MEDICINE

## 2025-05-01 PROCEDURE — 94761 N-INVAS EAR/PLS OXIMETRY MLT: CPT | Mod: HCNC

## 2025-05-01 PROCEDURE — 63600175 PHARM REV CODE 636 W HCPCS: Mod: HCNC | Performed by: STUDENT IN AN ORGANIZED HEALTH CARE EDUCATION/TRAINING PROGRAM

## 2025-05-01 PROCEDURE — 63600175 PHARM REV CODE 636 W HCPCS: Mod: HCNC | Performed by: PHYSICIAN ASSISTANT

## 2025-05-01 PROCEDURE — 99233 SBSQ HOSP IP/OBS HIGH 50: CPT | Mod: HCNC,,, | Performed by: INTERNAL MEDICINE

## 2025-05-01 PROCEDURE — 83735 ASSAY OF MAGNESIUM: CPT | Mod: HCNC

## 2025-05-01 PROCEDURE — 25000003 PHARM REV CODE 250: Mod: HCNC | Performed by: STUDENT IN AN ORGANIZED HEALTH CARE EDUCATION/TRAINING PROGRAM

## 2025-05-01 PROCEDURE — 97535 SELF CARE MNGMENT TRAINING: CPT | Mod: HCNC

## 2025-05-01 PROCEDURE — 11000001 HC ACUTE MED/SURG PRIVATE ROOM: Mod: HCNC

## 2025-05-01 PROCEDURE — 25000003 PHARM REV CODE 250: Mod: HCNC | Performed by: PHYSICIAN ASSISTANT

## 2025-05-01 PROCEDURE — 97530 THERAPEUTIC ACTIVITIES: CPT | Mod: HCNC

## 2025-05-01 PROCEDURE — 25000003 PHARM REV CODE 250: Mod: HCNC

## 2025-05-01 PROCEDURE — 63600175 PHARM REV CODE 636 W HCPCS: Mod: HCNC

## 2025-05-01 PROCEDURE — 97116 GAIT TRAINING THERAPY: CPT | Mod: HCNC

## 2025-05-01 PROCEDURE — 80158 DRUG ASSAY CYCLOSPORINE: CPT | Mod: HCNC | Performed by: INTERNAL MEDICINE

## 2025-05-01 PROCEDURE — 84100 ASSAY OF PHOSPHORUS: CPT | Mod: HCNC

## 2025-05-01 PROCEDURE — 80053 COMPREHEN METABOLIC PANEL: CPT | Mod: HCNC

## 2025-05-01 PROCEDURE — 85025 COMPLETE CBC W/AUTO DIFF WBC: CPT | Mod: HCNC

## 2025-05-01 RX ORDER — ONDANSETRON HYDROCHLORIDE 2 MG/ML
4 INJECTION, SOLUTION INTRAVENOUS ONCE
Status: COMPLETED | OUTPATIENT
Start: 2025-05-01 | End: 2025-05-01

## 2025-05-01 RX ORDER — ONDANSETRON HYDROCHLORIDE 2 MG/ML
INJECTION, SOLUTION INTRAVENOUS
Status: DISPENSED
Start: 2025-05-01 | End: 2025-05-01

## 2025-05-01 RX ORDER — PROCHLORPERAZINE EDISYLATE 5 MG/ML
5 INJECTION INTRAMUSCULAR; INTRAVENOUS ONCE
Status: COMPLETED | OUTPATIENT
Start: 2025-05-01 | End: 2025-05-01

## 2025-05-01 RX ADMIN — ALUMINUM HYDROXIDE, MAGNESIUM HYDROXIDE, AND SIMETHICONE 30 ML: 200; 200; 20 SUSPENSION ORAL at 09:05

## 2025-05-01 RX ADMIN — ONDANSETRON 4 MG: 2 INJECTION INTRAMUSCULAR; INTRAVENOUS at 10:05

## 2025-05-01 RX ADMIN — CARVEDILOL 3.12 MG: 3.12 TABLET, FILM COATED ORAL at 05:05

## 2025-05-01 RX ADMIN — SUCRALFATE 1 G: 1 SUSPENSION ORAL at 12:05

## 2025-05-01 RX ADMIN — ALUMINUM HYDROXIDE, MAGNESIUM HYDROXIDE, AND SIMETHICONE 30 ML: 200; 200; 20 SUSPENSION ORAL at 12:05

## 2025-05-01 RX ADMIN — OXYCODONE 5 MG: 5 TABLET ORAL at 09:05

## 2025-05-01 RX ADMIN — ENOXAPARIN SODIUM 30 MG: 30 INJECTION SUBCUTANEOUS at 05:05

## 2025-05-01 RX ADMIN — SUCRALFATE 1 G: 1 SUSPENSION ORAL at 11:05

## 2025-05-01 RX ADMIN — ASPIRIN 81 MG CHEWABLE TABLET 81 MG: 81 TABLET CHEWABLE at 09:05

## 2025-05-01 RX ADMIN — CARVEDILOL 3.12 MG: 3.12 TABLET, FILM COATED ORAL at 09:05

## 2025-05-01 RX ADMIN — ONDANSETRON 8 MG: 4 TABLET, ORALLY DISINTEGRATING ORAL at 07:05

## 2025-05-01 RX ADMIN — SUCRALFATE 1 G: 1 SUSPENSION ORAL at 06:05

## 2025-05-01 RX ADMIN — AMPICILLIN 2 G: 2 INJECTION, POWDER, FOR SOLUTION INTRAMUSCULAR; INTRAVENOUS at 12:05

## 2025-05-01 RX ADMIN — PANTOPRAZOLE SODIUM 40 MG: 40 TABLET, DELAYED RELEASE ORAL at 09:05

## 2025-05-01 RX ADMIN — PREGABALIN 50 MG: 50 CAPSULE ORAL at 09:05

## 2025-05-01 RX ADMIN — SERTRALINE HYDROCHLORIDE 25 MG: 25 TABLET ORAL at 09:05

## 2025-05-01 RX ADMIN — ZOLPIDEM TARTRATE 5 MG: 5 TABLET, FILM COATED ORAL at 10:05

## 2025-05-01 RX ADMIN — ALUMINUM HYDROXIDE, MAGNESIUM HYDROXIDE, AND SIMETHICONE 30 ML: 200; 200; 20 SUSPENSION ORAL at 05:05

## 2025-05-01 RX ADMIN — CALCITRIOL CAPSULES 0.25 MCG 0.25 MCG: 0.25 CAPSULE ORAL at 09:05

## 2025-05-01 RX ADMIN — SUCRALFATE 1 G: 1 SUSPENSION ORAL at 05:05

## 2025-05-01 RX ADMIN — CYCLOSPORINE 50 MG: 100 SOLUTION ORAL at 09:05

## 2025-05-01 RX ADMIN — TIZANIDINE 4 MG: 4 TABLET ORAL at 09:05

## 2025-05-01 RX ADMIN — CYCLOSPORINE 50 MG: 100 SOLUTION ORAL at 06:05

## 2025-05-01 RX ADMIN — NIFEDIPINE 60 MG: 30 TABLET, FILM COATED, EXTENDED RELEASE ORAL at 09:05

## 2025-05-01 RX ADMIN — PROCHLORPERAZINE EDISYLATE 5 MG: 5 INJECTION INTRAMUSCULAR; INTRAVENOUS at 11:05

## 2025-05-01 NOTE — PROGRESS NOTES
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           Assessment/Plan:      Palliative Care Encounter:  Impression:    Ms. Nadia Damon is a 70 y/o female with hx of heart tx (5/1993), left breat ca with chemo and lumpectomy (9/2021) and radiation (2022), CKD stg V, angina, ventral hernia, chronic back pain associated with cervical spine DJD,  HTN, fibromyalgia, thrombocytopenia.        She was scheduled for outpatient radiofrequency ablation (4/24) with pain management in , when she began to have nausea and CP and was found with abnormal EKG. She was transferred to [facility] [place] on 4[place]24. Workup for aortic dissection negative.     Pt with PEA cardiac arrest on 4/27. 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 However angio/further imaging contraindicated due to poor renal function.     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        Advance Care Planning   Advance Directives:   Living Will: Yes        Copy on chart: Yes    Do Not Resuscitate Status: Yes    Medical Power of : Yes    Agent's Name:  Moy Watkins (son)   Agent's Contact Number:  [phone]    Decision Making:  Patient answered questions and Family answered questions  Goals of Care: What is most important right now is to focus on remaining as independent as possible, symptom/pain control, improvement in condition but with limits to invasive therapies. Accordingly, we  "have decided that the best plan to meet the patient's goals includes continuing with treatment.    5/1/25:   - Visited with pt, with Moy (son) on phone, at  this morning. Pt is aaox3 and agreeable to continue GOC conversation.   -Pt reiterates plan for SNF upon d/c. I dicussed alternate plans if pt's recovery does not prove to be what pt is hoping for and/or she is with worsening symptom burden. I discussed that pt is likely to need increased assistance and may not be able to continue living alone. She and son are understanding.   -We discussed likely symptoms associated with CAV and renal failure, along with medications used to manage these symptoms.   -Pt shares she would not want to continue coming to hospital for health issues.   -I discussed philosophy of hospice and goals of care associated with hospice services including: avoidance of hospital, comfort through end of life outside of hospital, symptom management, and QOL.   -GOC: Pt to d/c to SNF. If/when pt feels symptoms become too burdensome, and/or pt is in "need" of hospitalization, she would choose to transition to hospice care at that time. Pt does not value repeated hospitalizations. Son is understanding and agreeable to this plan for his mother.       4/30/25:  -Visited with pt today at . She is sitting in chair, denies any acute pain or distress, and is with 2 friends visiting. Pt shares they are her long time friends and is agreeable to me speaking with them in room. I assured pt that I will call her son to introduce conversation, as well as revisit so that she and I may speak with son together.   -Pt is followed by [facility] in [place]. Introduced role of [facility] at this time to assist with GOC.   -Pt shares she is interested in SNF (if possible) after hospital d/c, so that she may get stronger and continue to live independently. She shares she relies on her friends to help her around home and assist with medications, etc if needed. She did not " want to further discuss hospice at this time.   -I shared that she may find herself weaker than prior to hospital admission, as well as concerns related to kidney function, therefore needing extra assistance.   -Pt shares her conversation with primary team this am of DNR and continued wish to not have dialysis, which is reasonable at this time. She is agreeable to continued conversation with son and myself      -Called pt's son and dicussed above conversations.  -We dicussed possibility of pt going to SNF (if accepted), which may give pt/family some time to organize next step after SNF. -We discussed possibility that attempted rehab at SNF may be beneficial for pt to see how she truly feels post hospitalization with CP, kidney dysfunction, etc.   -We dicussed possibility of transition to hospice when pt ready, educating that hospice services can be provided at NH or pt/family home.   -I discussed philosophy of hospice and goals of care associated with hospice services including: avoidance of hospital, comfort through end of life outside of hospital, symptom management, and QOL.   -Given pt/family discussion with primary team for DNR, I  discussed utility of LAPOST with son, in event of d/c to SNF.   -LAPOST completed. Awaiting remote signature of son and MD. Will follow up.         Symptom Management:  -Pain: Pt with chronic back pain. Follows with pain service. Home meds: percocet 5-325mg q4 prn pain.       -Anxiety: per psychiatry notes, pt with OCPD and recently started on Sertraline 25mg;  previous duloxetine d/c because of tenuous renal status.       Recommendations and follow up plan:  -Most important goals at this time: Continued dispo planning.    -Code status: DNR  -Disposition: To be determined.       The above recommendations communicated directly to primary team on 5/1/25    Thank you for your consult. I will sign off. Please contact us if you have any additional questions.       Subjective:     Chief  "Complaint: No chief complaint on file.        HPI:   Per chart review: "Ms. Damon is a 68 y/o AAF s/p OHTx 5/27/93 at [facility], s/p PPM same date, mild anemia and leukopenia, OA, cervical spine DJD with radiculopathy and chronic neck pain who presents for her 31st post annual transplant evaluation. Four years ago after her annual was found to have BRCA, underwent excision, chemo/radiation, had a rough time of it, with some complications related to it, however recovered well.      Transferred from [facility] ED due to chest pain. She initially was at the [facility] for pain management procedure for chronic back pain. In preprocedure area complained of nausea and chest pain having taken a sublingual nitro prior to arrival. Reported intermittent chest pain > 1 week. Took all regular medications on an empty stomach and per notes had n/v after starting IV. ECG with repol abnormalities. Noted to be hypokalemic and hypomagnesemic. Troponin I mildly elevated.      Off note, a few months ago was evaluated by [facility] for a reducible ventral hernia containing portion of the liver no bowel noted on exam or imaging. At that time also reported chronic nausea. Elective hernia repair deemed not emergent and if she should require intervention to be done at center with cardiac surgery and Cardiology heart failure services."      Hospital Course:  Per chart review: " 4/26:  No overnight events but still complaining of Chest pain spreading to should blades. Being moved to ICU to get AYAAN to r/o aortic dissection (unable to get CTA due to CKD).   4/27:  This AM around 700 patient had PEA arrest reportedly shortly after using the restroom and returning to bed. ROSC achieved after round of CPR w/ 1 dose of epi. Post cardiac arrest she was intubated for airway protection. Was very slow to wake up (unresponsive for 2-3 hours post sedation stopping) but around 1130 woke up and started following all commands.  4/28: Patient remains intubated. EEG in " "process; patient awake and following commands and responding appropriately. She is complaining of chest pain with inspiration; likely from CPR. Pulm contact for extubation. She has been doing well on SBT all morning.   4/29: Pt extubated.She was given corticosteroids overnight and extubated this morning. No new complaints. EEG unremarkable. We started Lovenox for DVT prophylaxis. "     Review of Symptoms      Symptom Assessment (ESAS 0-10 Scale)  Pain:  5  Dyspnea:  0  Anxiety:  4  Nausea:  0  Depression:  0  Anorexia:  0  Fatigue:  0  Insomnia:  0  Restlessness:  0  Agitation:  0         Pain Assessment:    Location(s): back    Back       Location: generalized        Quality: Aching        Quantity: 5/10 in intensity        Chronicity: Onset 2 year(s) ago, stable        Aggravating Factors: None        Alleviating Factors: Opiates       Associated Symptoms: None    Performance Status:  70    Living Arrangements:  Lives alone    Psychosocial/Cultural:   See Palliative Psychosocial Note: Yes  **Primary  to Follow**  Palliative Care  Consult: No    Spiritual:  F - Angela and Belief:  Baptism           ROS:  Review of Systems   Cardiovascular:  Positive for chest pain.   Musculoskeletal:  Positive for myalgias.         Past Medical History:   Diagnosis Date    Abdominal wall hernia     CT Renal 6/11/2018---Small fat containing superior ventral abdominal wall hernia at the epicardial pacing lead site.    Abnormal mammogram 10/12/2021    Acute cystitis without hematuria 05/10/2022    Acute ischemic right middle cerebral artery (MCA) stroke 07/20/2024    Adverse drug reaction 11/12/2024    GRACE (acute kidney injury) 11/22/2021    Anxiety     Aphasia 07/19/2024    Arthritis     ZEN HIPS    Breast cancer in female 08/2021    LEFT BREAST    Breast mass, right 11/13/2024    RIGHT BREAST MASS (Problem)      Bronchitis 08/18/2016    Never smoked      C. difficile colitis 11/29/2021    Cellulitis of " axilla, left 12/23/2021    Chronic diastolic heart failure 12/16/2021    Chronic kidney disease     stage 4, GFR 15-29 ml/min    Chronic midline low back pain without sciatica 06/18/2018    CKD (chronic kidney disease) stage 4, GFR 15-29 ml/min 04/20/2016    US Retro   5/16/2018---Mild cortical thinning and increased cortical echogenicity.  Findings can be seen with medical renal disease.  8/31/216--- Echogenic kidneys with diffuse cortical thinning suggesting  medical renal disease. 2 complex right renal cortical cysts.      Closed nondisplaced fracture of distal phalanx of left great toe with routine healing 10/22/2018    Coronary artery disease 1993    heart transplant    COVID-19 in immunocompromised patient 02/26/2024    Cystitis 05/10/2022    Depression     Encounter for blood transfusion     Encounter for palliative care 10/14/2024    Fibromyalgia     on Lyrica    Heart failure     native heart cardiomyopathy    Heart transplanted 1993    due to cardiomyopathy    History of hyperparathyroidism; Hyperparathyroidism, secondary renal     PT DENIES    Hypertension     Immune disorder     anti rejection meds    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    Impaired mobility 07/28/2022    Iron deficiency anemia 08/15/2017    Kidney stones     passed per pt    Obesity     Obesity (BMI 30.0-34.9) 07/22/2019    Other osteoporosis without current pathological fracture 08/30/2019    Other pancytopenia 05/06/2024    Parotitis, acute 09/19/2023    Pharyngitis 01/09/2019    Pneumonia due to infectious organism 03/14/2024    PONV (postoperative nausea and vomiting)     Severe sepsis 11/22/2021    Shingles 2003 approx    left leg    Stage 4 chronic kidney disease 11/22/2021    Subclinical hypothyroidism 06/16/2023    Thrombocytopenia, unspecified 11/29/2021    Trouble in sleeping     Unresponsiveness 11/13/2024    Urinary incontinence      Past Surgical History:   Procedure Laterality Date    bladder implant  Right 06/11/2024    BLADDER SURGERY  2015 approx    mesh - Dr Everett then 2nd reconstructive sx Dr Onofre    BREAST BIOPSY Bilateral     NEGATIVE    BREAST BIOPSY Right 10/31/2022    benign    BREAST LUMPECTOMY Left 2021    BREAST SURGERY Left 09/28/2015    Bx - benign    BREAST SURGERY Right 12/2015    Bx benign    CARDIAC PACEMAKER REMOVAL Left 06/26/2014    Pacer defirillator removed. Put in 1993 aat time of heart transplant    CARPAL TUNNEL RELEASE Left 03/03/2015    Dr. Hall    COLONOSCOPY N/A 02/25/2021    Procedure: COLONOSCOPY;  Surgeon: Freida Ramirez MD;  Location: [facility];  Service: Endoscopy;  Laterality: N/A;    CYSTOCELE REPAIR      Twice with mesh removal    ECHOCARDIOGRAM,TRANSESOPHAGEAL N/A 4/26/2025    Procedure: Transesophageal echo (AYAAN) intra-procedure log documentation;  Surgeon: Barron Chinchilla MD;  Location: [address];  Service: Cardiothoracic;  Laterality: N/A;    EPIDURAL STEROID INJECTION INTO CERVICAL SPINE N/A 02/02/2023    Procedure: T11/T12 IL HELLEN;  Surgeon: Jassi Pierre MD;  Location: [facility];  Service: Pain Management;  Laterality: N/A;    EPIDURAL STEROID INJECTION INTO CERVICAL SPINE N/A 9/16/2024    Procedure: T11/T12 IL HELLEN;  Surgeon: Jassi Pierre MD;  Location: [facility];  Service: Pain Management;  Laterality: N/A;    HEART TRANSPLANT  1993    HERNIA REPAIR Right 1971 approx    Inguinal    HYSTERECTOMY  1983    vag hyst /LSO     IMPLANTATION OF PERMANENT SACRAL NERVE STIMULATOR N/A 06/11/2024    Procedure: INSERTION, NEUROSTIMULATOR, PERMANENT, SACRAL;  Surgeon: Sami Cochran MD;  Location: [facility] OR;  Service: Urology;  Laterality: N/A;    INCISION AND DRAINAGE OF ABSCESS Left 12/24/2021    Procedure: INCISION AND DRAINAGE, ABSCESS;  Surgeon: Joseph Longo MD;  Location: [facility] OR;  Service: General;  Laterality: Left;    INJECTION OF ANESTHETIC AGENT AROUND MEDIAL BRANCH NERVES INNERVATING LUMBAR FACET JOINT Right 10/19/2022     Procedure: Right L4/L5 and L5/S1 MBB;  Surgeon: Jassi Pierre MD;  Location: [facility];  Service: Pain Management;  Laterality: Right;    INJECTION OF ANESTHETIC AGENT AROUND MEDIAL BRANCH NERVES INNERVATING LUMBAR FACET JOINT Right 11/09/2022    Procedure: Right L4/L5 and L5/S1 MBB;  Surgeon: Jassi Pierre MD;  Location: [facility];  Service: Pain Management;  Laterality: Right;    INJECTION OF ANESTHETIC AGENT AROUND MEDIAL BRANCH NERVES INNERVATING LUMBAR FACET JOINT Left 2/6/2025    Procedure: Left L4-5 and L5-S1 MBB #1 with local;  Surgeon: Jassi Pierre MD;  Location: [facility];  Service: Pain Management;  Laterality: Left;    INJECTION OF ANESTHETIC AGENT AROUND MEDIAL BRANCH NERVES INNERVATING LUMBAR FACET JOINT Left 3/19/2025    Procedure: Left L4-5 and L5-S1 MBB #2 with local;  Surgeon: Jassi Pierre MD;  Location: [facility];  Service: Pain Management;  Laterality: Left;    INJECTION OF ANESTHETIC AGENT INTO SACROILIAC JOINT Right 08/22/2022    Procedure: Right SIJ Injection Right L5/S1 Facte Injection;  Surgeon: Jassi Pierre MD;  Location: [facility];  Service: Pain Management;  Laterality: Right;    INSERTION OF TUNNELED CENTRAL VENOUS CATHETER (CVC) WITH SUBCUTANEOUS PORT N/A 11/09/2021    Procedure: [id number]-D-CATH;  Surgeon: Christoph Douglas MD;  Location: [facility] OR;  Service: General;  Laterality: N/A;    RADIOFREQUENCY ABLATION Right 12/5/2024    Procedure: Right L4-5 and L5-S1 RFA;  Surgeon: Jassi Pierre MD;  Location: [facility];  Service: Pain Management;  Laterality: Right;    RADIOFREQUENCY THERMOCOAGULATION Right 12/07/2022    Procedure: Right L4/L5 and L5/S1 Lumbar RFA;  Surgeon: Jassi Pierre MD;  Location: [facility];  Service: Pain Management;  Laterality: Right;    REMOVAL OF ELECTRODE LEAD OF SACRAL NERVE STIMULATOR N/A 2/18/2025    Procedure: REMOVAL, ELECTRODE LEAD, SACRAL NERVE STIMULATOR;  Surgeon: Sami Cochran MD;   Location: [facility] OR;  Service: Urology;  Laterality: N/A;    REMOVAL OF VASCULAR ACCESS PORT      ROBOT-ASSISTED LAPAROSCOPIC ABDOMINAL SACROCOLPOPEXY N/A 08/10/2023    Procedure: ROBOTIC SACROCOLPOPEXY, ABDOMEN;  Surgeon: PRANAY Villalobos MD;  Location: [facility] OR;  Service: OB/GYN;  Laterality: N/A;    ROBOT-ASSISTED LAPAROSCOPIC OOPHORECTOMY Right 08/10/2023    Procedure: ROBOTIC OOPHORECTOMY;  Surgeon: PRANAY Villalobos MD;  Location: [facility] OR;  Service: OB/GYN;  Laterality: Right;    SENTINEL LYMPH NODE BIOPSY Left 10/12/2021    Procedure: BIOPSY, LYMPH NODE, SENTINEL;  Surgeon: Christoph Douglas MD;  Location: [facility] OR;  Service: General;  Laterality: Left;    TOE SURGERY      XI ROBOTIC URETHROPEXY N/A 08/10/2023    Procedure: XI ROBOTIC URETHROPEXY;  Surgeon: PRANAY Villalobos MD;  Location: [facility] OR;  Service: OB/GYN;  Laterality: N/A;     Family History   Problem Relation Name Age of Onset    Cancer Mother  38        breast    Breast cancer Mother      Breast cancer Maternal Grandmother      Heart disease Maternal Grandmother      Hypertension Son      Cataracts Cousin      Diabetes Neg Hx      Stroke Neg Hx      Kidney disease Neg Hx      Asthma Neg Hx      COPD Neg Hx      Melanoma Neg Hx      Hyperlipidemia Neg Hx           Review of patient's allergies indicates:   Allergen Reactions    Dobutamine Other (See Comments)     Severe Left sided chest pain after dosage administered for Dobutamine Stress Test; itching    Lisinopril Swelling and Rash    Hydrocodone-acetaminophen Nausea Only    Augmentin [amoxicillin-pot clavulanate] Diarrhea    Zyvox [linezolid] Nausea And Vomiting       Medications:  Current Medications[1]         Objective:      Physical Exam:  Vitals: Temp: 97.7 °F (36.5 °C) (05/01/25 0900)  Pulse: 103 (05/01/25 1000)  Resp: 20 (05/01/25 1000)  BP: (!) 141/76 (05/01/25 1000)  SpO2: 99 % (05/01/25 1000)    Physical Exam  Neurological:      Mental Status: She is alert and oriented to person,  "place, and time.                 Labs:   Creatinine   Date Value Ref Range Status   05/01/2025 3.4 (H) 0.5 - 1.4 mg/dL Final      Hemoglobin   Date Value Ref Range Status   02/24/2025 9.1 (L) 12.0 - 16.0 g/dL Final   02/24/2025 9.1 (L) 12.0 - 16.0 g/dL Final     HGB   Date Value Ref Range Status   05/01/2025 9.2 (L) 12.0 - 16.0 gm/dL Final      Albumin   Date Value Ref Range Status   05/01/2025 3.1 (L) 3.5 - 5.2 g/dL Final   04/30/2025 2.9 (L) 3.5 - 5.2 g/dL Final   04/29/2025 3.1 (L) 3.5 - 5.2 g/dL Final   02/24/2025 3.3 (L) 3.5 - 5.2 g/dL Final   02/24/2025 3.3 (L) 3.5 - 5.2 g/dL Final   02/07/2025 3.3 (L) 3.5 - 5.2 g/dL Final          Imaging: Transthoracic echo (TTE) complete    Height: 5' 2" (1.575 m)   Weight: 69 kg (152 lb 1.9 oz)   Blood Pressure: 135/85    Date of Study: 4/28/25   Ordering Provider: Huber Novak PA-C    Clinical Indications: S/P orthotopic heart transplant [Z94.1 (ICD-10-CM)]       Reading Physicians  Performing Staff   Cardiology: Darline Mosqueda MD     Tech: Marcel Chan         Reason for Exam  Priority: STAT  Dx: S/P orthotopic heart transplant [Z94.1 (ICD-10-CM)]     View Images Vital Vitrea     Show images for Echo  Summary  Show Result Comparison     S/P heart transplant 1993.    Left Ventricle: The left ventricle is normal in size. Normal wall thickness. There is concentric remodeling. There is mildly reduced systolic function. Ejection fraction is approximately 45%. Mild global hypokinesis present. Contractility is better preserved in the basal segements. There is normal diastolic function.    Right Ventricle: The right ventricle is normal in size. Systolic function is moderately reduced. TAPSE is 0.9 cm.    Tricuspid Valve: There is mild to moderate regurgitation.    Pulmonary Artery: The estimated pulmonary artery systolic pressure is 28 mmHg.    IVC/SVC: Normal venous pressure at 3 mmHg.        Vitals    Height Weight BMI (Calculated) BSA (Calculated - sq m) BP Pulse " "  5' 2" (1.575 m) 69 kg (152 lb 1.9 oz) 27.8 1.74 sq meters 135/85 74     Study Details A complete echo was performed using complete 2D, color flow Doppler and spectral Doppler. During the study, the apical, parasternal and subcostal views were captured.  Overall the study quality was adequate. This was a portable study performed at the patient's bedside. The patient is status post cardiac transplantation. Atrial measurements are not reported.     Echocardiography Findings    Left Ventricle The left ventricle is normal in size. Normal wall thickness. There is concentric remodeling. Mild global hypokinesis present. Contractility is better preserved in the basal segements. There is mildly reduced systolic function. Ejection fraction is approximately 45%. There is normal diastolic function.   Right Ventricle The right ventricle is normal in size. Systolic function is moderately reduced. TAPSE is 0.9 cm.   Left Atrium Normal left atrial size. The patient is s/p heart transplant. Atrial dimensions are not reported. .   Right Atrium Normal right atrial size. The patient is s/p heart transplant. Atrial dimensions are not reported. .   Aortic Valve The aortic valve is structurally normal. There is normal leaflet mobility. Aortic valve peak velocity is 0.8 m/s. Mean gradient is 1 mmHg. There is no significant regurgitation.   Mitral Valve The mitral valve is structurally normal. There is normal leaflet mobility. There is no significant regurgitation.   Tricuspid Valve The tricuspid valve is structurally normal. There is normal leaflet mobility. There is mild to moderate regurgitation.   Pulmonic Valve Not well visualized due to poor acoustic window.   IVC/SVC Normal venous pressure at 3 mmHg.   Ascending Aorta The aortic root is normal in size measuring 3.11 cm. The ascending aorta is normal in size measuring 2.4 cm.   Pericardium and Other Findings There is no pericardial effusion.   Pulmonary Artery The estimated pulmonary " artery systolic pressure is 28 mmHg.     Exam Details    Performed Procedure Technologist     Transthoracic echo (TTE) Marcel Padilla            Begin Exam End Exam     4/28/2025 11:05 AM CDT 4/28/2025 11:39 AM CDT              2D Measurements    Dimensions   LVIDd 4 cm  (Range: 3.5 - 6.0)         LVIDs 3.3 cm  (Range: 2.1 - 4.0)         IVS 0.9 cm  (Range: 0.6 - 1.1)         PW 0.9 cm  (Range: 0.6 - 1.1)         FS 17.5 %  (Range: 28 - 44) Abnormal          LV mass 109.7 g         EF 45 %         Echo EF Estimated 40 %          Dimensions   TAPSE 0.9 cm          Aortic Root - End Diastolic   Sinus 3.11 cm         STJ 3.1 cm         Ascending aorta 2.4 cm          Inferior Vena Cava   Est. RA pres 3 mmHg                  > 50% of 55 min visit spent in chart review, face to face discussion of goals of care,  symptom assessment, coordination of care, charting, and emotional support     Thank you for the opportunity to care for this patient and family.       Ness Hernandez, CNS         [1]   Current Facility-Administered Medications:     acetaminophen tablet 650 mg, 650 mg, Oral, Q4H PRN, Marshall Bassett MD, 650 mg at 04/28/25 2059    aluminum-magnesium hydroxide-simethicone 200-200-20 mg/5 mL suspension 30 mL, 30 mL, Oral, Q6H PRN, Marshall Bassett MD, 30 mL at 04/27/25 1040    aluminum-magnesium hydroxide-simethicone 200-200-20 mg/5 mL suspension 30 mL, 30 mL, Oral, QID (AC & HS), Marshall Bassett MD, 30 mL at 05/01/25 0551    ampicillin (OMNIPEN) 2 g in 0.9% NaCl 100 mL IVPB (MB+), 2 g, Intravenous, Q24H, Mohinder Barrow MD    aspirin chewable tablet 81 mg, 81 mg, Oral, Daily, Chelsea Claros PA, 81 mg at 05/01/25 0917    calcitRIOL capsule 0.25 mcg, 0.25 mcg, Oral, Daily, Marshall Bassett MD, 0.25 mcg at 05/01/25 0917    carvediloL tablet 3.125 mg, 3.125 mg, Oral, BID WM, Marshall Bassett MD, 3.125 mg at 05/01/25 0917    cycloSPORINE (NEORAL) 100 mg/mL  microemulsion solution 50 mg, 50 mg, Oral, BID, Chelsea Claros PA, 50 mg at 05/01/25 0918    enoxaparin injection 30 mg, 30 mg, Subcutaneous, Q24H (prophylaxis, 1700), Chelsea Claros PA, 30 mg at 04/30/25 1625    morphine injection 2 mg, 2 mg, Intravenous, Q4H PRN, Blair De Jesus NP, 2 mg at 04/30/25 2101    NIFEdipine 24 hr tablet 60 mg, 60 mg, Oral, Daily, Marshall Bassett MD, 60 mg at 05/01/25 0917    ondansetron 4 mg/2 mL injection, , , ,     ondansetron disintegrating tablet 8 mg, 8 mg, Oral, Q8H PRN, Marshall Bassett MD, 8 mg at 04/30/25 1506    oxyCODONE immediate release tablet 5 mg, 5 mg, Oral, Q4H PRN, Marshall Bassett MD, 5 mg at 05/01/25 0922    pantoprazole EC tablet 40 mg, 40 mg, Oral, Daily, Eugene Ritchie MD, 40 mg at 05/01/25 0917    polyethylene glycol packet 17 g, 17 g, Oral, BID PRN, Marshall Bassett MD    potassium bicarbonate disintegrating tablet 30 mEq, 30 mEq, Per OG tube, Once, Mohinder Barrow MD    pregabalin capsule 50 mg, 50 mg, Oral, QHS, Eugene Ritchie MD    racepinephrine 2.25 % nebulizer solution 0.5 mL, 0.5 mL, Nebulization, Q4H PRN, Keri Dyson MD, 0.5 mL at 04/27/25 1420    sertraline tablet 25 mg, 25 mg, Oral, Daily, Marshall Bassett MD, 25 mg at 05/01/25 0917    sodium chloride 0.9% flush 10 mL, 10 mL, Intravenous, PRN, Marshall Bassett MD    sucralfate 100 mg/mL suspension 1 g, 1 g, Oral, Q6H, Marshall Bassett MD, 1 g at 05/01/25 0551    tiZANidine tablet 4 mg, 4 mg, Oral, BID, Marshall Bassett MD, 4 mg at 05/01/25 0917    zolpidem tablet 5 mg, 5 mg, Oral, Nightly PRN, Marshall Bassett MD, 5 mg at 04/30/25 3460

## 2025-05-01 NOTE — PHYSICIAN QUERY
Please clarify the stage of chronic kidney disease (CKD):    Chronic kidney disease (CKD) stage 4 - (eGFR 15-29)

## 2025-05-01 NOTE — PROGRESS NOTES
Pharmacist Renal Dose Adjustment Note    Nadia Damon is a 69 y.o. female being treated with the medication ampicillin    Patient Data:    Vital Signs (Most Recent):  Temp: 97.7 °F (36.5 °C) (05/01/25 0900)  Pulse: 103 (05/01/25 1000)  Resp: 20 (05/01/25 1000)  BP: (!) 141/76 (05/01/25 1000)  SpO2: 99 % (05/01/25 1000) Vital Signs (72h Range):  Temp:  [96.8 °F (36 °C)-99.7 °F (37.6 °C)]   Pulse:  []   Resp:  [10-46]   BP: ()/()   SpO2:  [74 %-100 %]      Recent Labs   Lab 04/29/25  0356 04/30/25  0435 05/01/25  0359   CREATININE 2.6* 3.5* 3.4*     Serum creatinine: 3.4 mg/dL (H) 05/01/25 0359  Estimated creatinine clearance: 13.7 mL/min (A)    Medication: ampicillin 1 g iv daily will be changed to 2 g iv q24h    Pharmacist's Name: Mary Oliver  Pharmacist's Extension: [phone]

## 2025-05-01 NOTE — PLAN OF CARE
CICU DAILY GOALS     - Pt c/o nausea, eventually vomiting x1 large yellow emesis.  Compazine given x1  - Severe rib/chest pain reported by patient.  PRN medications given as ordered.  - Pt hypotensive (MAP 49-55) at approx 2200.  Pt resting in bed, easily aroused with gentle touch, NIBP cuff set to cycle q5 min.  MD Marium notified, 250cc bolus ordered and administered.  Pt tolerated well, MAP returned to > 65.  BP cuff set to cycle q30 min.  Instructed to inform MD Marium if MAP < 60 again and possibly give additional fluid as necessary.     A: Awake   - AO4  - Afebrile  B: Breath   SBT: Not intubated   C: Coordinate A & B, analgesics/sedatives   Pain: managed    SAT: Not intubated  D: Delirium   CAM-ICU:    E: Early(intubated/ Progressive (non-intubated) Mobility   MOVE Screen: Pass   Activity: Activity Management: Back in bed  FAS: Feeding/Nutrition   Diet order: Diet/Nutrition Received: full liquid,   Fluid restriction:    T: Thrombus   DVT prophylaxis: VTE Core Measure: Pharmacological prophylaxis initiated/maintained  H: HOB Elevation   Head of Bed (HOB) Positioning: HOB at 30-45 degrees  U: Ulcer Prophylaxis   GI: no  G: Glucose control   managed    S: Skin   Bathing/Skin Care: bath, complete;dressed/undressed;incontinence care;linen changed (04/30/25 2101)  Wounds: No  Wound care consulted: No  B: Bowel Function   no issues   I: Indwelling Catheters   Swain necessity: [REMOVED]      Urethral Catheter 04/27/25 0800-Reason for Continuing Urinary Catheterization: Critically ill in ICU and requiring hourly monitoring of intake/output   CVC necessity: No  D: De-escalation Antibx   No  Plan for the day   - Stepdown to floor if MAP remains > 65  Family/Goals of care/Code Status   Code Status: DNR      Problem: Adult Inpatient Plan of Care  Goal: Plan of Care Review  Outcome: Progressing  Goal: Patient-Specific Goal (Individualized)  Outcome: Progressing  Goal: Absence of Hospital-Acquired Illness or Injury  Outcome:  Progressing  Goal: Optimal Comfort and Wellbeing  Outcome: Progressing  Goal: Readiness for Transition of Care  Outcome: Progressing

## 2025-05-01 NOTE — SUBJECTIVE & OBJECTIVE
Interval History: NAEON. Patient refers feeling much better than yesterday. She still has chest soreness after the CPR but is improving. Patient had urine cultures positive for E.Faecalis. Patient complains of dysuria. Will start her on antibiotic therapy to treat UTI. SW working on nursing facility placement.     Continuous Infusions:  Scheduled Meds:   aluminum-magnesium hydroxide-simethicone  30 mL Oral QID (AC & HS)    aspirin  81 mg Oral Daily    calcitRIOL  0.25 mcg Oral Daily    carvediloL  3.125 mg Oral BID WM    cycloSPORINE  50 mg Oral BID    enoxparin  30 mg Subcutaneous Q24H (prophylaxis, 1700)    NIFEdipine  60 mg Oral Daily    ondansetron        ondansetron  4 mg Intravenous Once    pantoprazole  40 mg Oral Daily    potassium bicarbonate  30 mEq Per OG tube Once    pregabalin  50 mg Oral QHS    sertraline  25 mg Oral Daily    sucralfate  1 g Oral Q6H    tiZANidine  4 mg Oral BID     PRN Meds:  Current Facility-Administered Medications:     acetaminophen, 650 mg, Oral, Q4H PRN    aluminum-magnesium hydroxide-simethicone, 30 mL, Oral, Q6H PRN    morphine, 2 mg, Intravenous, Q4H PRN    ondansetron, , ,     ondansetron, 8 mg, Oral, Q8H PRN    oxyCODONE, 5 mg, Oral, Q4H PRN    polyethylene glycol, 17 g, Oral, BID PRN    racepinephrine, 0.5 mL, Nebulization, Q4H PRN    sodium chloride 0.9%, 10 mL, Intravenous, PRN    zolpidem, 5 mg, Oral, Nightly PRN    Review of patient's allergies indicates:   Allergen Reactions    Dobutamine Other (See Comments)     Severe Left sided chest pain after dosage administered for Dobutamine Stress Test; itching    Lisinopril Swelling and Rash    Hydrocodone-acetaminophen Nausea Only    Augmentin [amoxicillin-pot clavulanate] Diarrhea    Zyvox [linezolid] Nausea And Vomiting     Objective:     Vital Signs (Most Recent):  Temp: 97.7 °F (36.5 °C) (05/01/25 0900)  Pulse: 98 (05/01/25 0900)  Resp: (!) 24 (05/01/25 0922)  BP: (!) 120/59 (05/01/25 0900)  SpO2: 96 % (05/01/25 0915)  Vital Signs (24h Range):  Temp:  [97.4 °F (36.3 °C)-98.8 °F (37.1 °C)] 97.7 °F (36.5 °C)  Pulse:  [] 98  Resp:  [10-32] 24  SpO2:  [74 %-99 %] 96 %  BP: ()/() 120/59     Patient Vitals for the past 72 hrs (Last 3 readings):   Weight   05/01/25 0900 66.2 kg (146 lb)   04/29/25 0301 70 kg (154 lb 5.2 oz)   04/28/25 1030 69 kg (152 lb 1.9 oz)     Body mass index is 26.7 kg/m².      Intake/Output Summary (Last 24 hours) at 5/1/2025 1021  Last data filed at 5/1/2025 0900  Gross per 24 hour   Intake 1410.1 ml   Output 1200 ml   Net 210.1 ml       Hemodynamic Parameters:       Telemetry: NSR       Physical Exam  Constitutional:       Appearance: Normal appearance.   HENT:      Head: Normocephalic.      Mouth/Throat:      Mouth: Mucous membranes are moist.   Eyes:      Pupils: Pupils are equal, round, and reactive to light.   Cardiovascular:      Rate and Rhythm: Normal rate and regular rhythm.      Heart sounds: No murmur heard.  Pulmonary:      Effort: Pulmonary effort is normal.   Chest:      Chest wall: Tenderness present.   Abdominal:      General: Abdomen is flat.      Palpations: Abdomen is soft.   Musculoskeletal:      Right lower leg: No edema.      Left lower leg: No edema.   Neurological:      Mental Status: She is alert.            Significant Labs:  CBC:  Recent Labs   Lab 04/29/25  0356 04/30/25 0435 05/01/25  0359   WBC 3.95 5.28 3.51*   RBC 3.64* 2.98* 3.20*   HGB 10.5* 8.7* 9.2*   HCT 33.3* 27.0* 28.2*    149* 169   MCV 92 91 88   MCH 28.8 29.2 28.8   MCHC 31.5* 32.2 32.6     BNP:  Recent Labs   Lab 04/24/25  1343   *     CMP:  Recent Labs   Lab 04/29/25  0356 04/30/25  0435 05/01/25  0359    109 107   CALCIUM 8.7 8.3* 8.5*   ALBUMIN 3.1* 2.9* 3.1*   PROT 8.2 7.1 7.7    141 143   K 4.4 4.6 4.1   CO2 20* 15* 20*    109 109   BUN 36* 57* 55*   CREATININE 2.6* 3.5* 3.4*   ALKPHOS 130 117 127   * 208* 158*   * 175* 99*   BILITOT 0.5 0.4 0.5     "  Coagulation:   Recent Labs   Lab 04/27/25  0747   INR 1.0   APTT 22.5     LDH:  No results for input(s): "LDH" in the last 72 hours.  Microbiology:  Microbiology Results (last 7 days)       Procedure Component Value Units Date/Time    Urine culture [[id number]]  (Abnormal)  (Susceptibility) Collected: 04/27/25 0845    Order Status: Completed Specimen: Urine, Catheterized Updated: 05/01/25 0222     Urine Culture >100,000 cfu/ml Enterococcus faecalis    Blood culture [[id number]]  (Normal) Collected: 04/25/25 0319    Order Status: Completed Specimen: Blood Updated: 04/30/25 1802     Blood Culture No Growth After 5 Days    Blood culture [[id number]]  (Normal) Collected: 04/25/25 0324    Order Status: Completed Specimen: Blood Updated: 04/30/25 1802     Blood Culture No Growth After 5 Days    Blood culture [[id number]]  (Normal) Collected: 04/27/25 0923    Order Status: Completed Specimen: Blood from Peripheral, Antecubital, Right Updated: 04/30/25 1100     Blood Culture No Growth After 72 Hours    Blood culture [[id number]]  (Normal) Collected: 04/27/25 0942    Order Status: Completed Specimen: Blood from Peripheral, Antecubital, Left Updated: 04/30/25 1100     Blood Culture No Growth After 72 Hours            I have reviewed all pertinent labs within the past 24 hours.    Estimated Creatinine Clearance: 13.7 mL/min (A) (based on SCr of 3.4 mg/dL (H)).    Diagnostic Results:  I have reviewed and interpreted all pertinent imaging results/findings within the past 24 hours.  "

## 2025-05-01 NOTE — PT/OT/SLP PROGRESS
Occupational Therapy  Co -  Treatment with PT    Co-evaluation/treatment performed due to patient's multiple deficits requiring two skilled therapists to appropriately and safely assess patient's strength and endurance while facilitating functional tasks in addition to accommodating for patient's activity tolerance.       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  She presents with performance deficits affecting function are weakness, impaired endurance, impaired self care skills, impaired functional mobility, gait instability, impaired balance, pain, impaired cardiopulmonary response to activity.     Pt engaged well in therapy this date, encountered with HOB raised, pleasant demeanor and  agreeable to therapy this date. Pt reports improved chest pain as compared to evaluative session, and required minimal physical assistance to complete functional mobility and ADLs this date. Pt found with soiled personal pants so assisted with changing pants, requiring 3 STS transfers from upright chair. During brief functional mobility trial in room, pt presented with symptomatic hypotension, therefore ambulation in hallway deferred for safety, RN notified.  Pt would benefit from post acute therapy at low level of intensity.     Rehab Prognosis:  Good; patient would benefit from acute skilled OT services to address these deficits and reach maximum level of function.       Plan:     Patient to be seen 4 x/week to address the above listed problems via self-care/home management, therapeutic activities,  "therapeutic exercises, neuromuscular re-education  Plan of Care Expires: 05/28/25  Plan of Care Reviewed with: patient    Subjective     Chief Complaint: "I'm feeling better today"   Patient/Family Comments/goals: Get better, return home   Pain/Comfort:  Pain Rating 1:  (not rated)  Location - Orientation 1: generalized  Location 1: chest  Pain Addressed 1: Reposition, Distraction  Pain Rating Post-Intervention 1:  (not rated)    Objective:     Communicated with: RN prior to session.  Patient found HOB elevated with blood pressure cuff, pulse ox (continuous), telemetry, PureWick upon OT entry to room.    General Precautions: Standard, fall    Orthopedic Precautions:N/A  Braces: N/A  Respiratory Status: Room air     Occupational Performance:     Bed Mobility:    Patient completed Rolling/Turning to Left with  stand by assistance  Patient completed Rolling/Turning to Right with stand by assistance  Patient completed Scooting/Bridging with stand by assistance  Patient completed Supine to Sit with stand by assistance   Pt able to sit EOB with good upright balance    Functional Mobility/Transfers:  Patient completed Sit <> Stand Transfer:   From EOB with contact guard assistance  with  straight cane   From upright chair x4x with CGA to SBA with straight cane   Patient completed Bed <> Chair Transfer using Step Transfer technique with minimum assistance with straight cane  Functional Mobility: Pt able to complete functional mobility in room ~18' with SPC + min A progressing to CGA, demonstrating unsteady gait + NBOS and cues for upright posture + positioning with SPC    Activities of Daily Living:  Grooming: independence wiping hands with moist towelette  Upper Body Dressing: contact guard assistance donning 2nd gown while navigating lines   Lower Body Dressing: minimum assistance doffing soiled pants and donning fresh pants while threading BLE in/out of pants legs  Toileting: stand by assistance completing " posterior/anterior mary lou-care while in standing with SBA/managed Purewick      [facility] 6 Click ADL: 19    Treatment & Education:  Pt educated on role of OT, POC, and goals for therapy.    POC was dicussed with patient/caregiver, who was included in its development and is in agreement with the identified goals and treatment plan.   Patient and family aware of patient's deficits and therapy progression.   Time provided for therapeutic counseling and discussion of health disposition.   Educated on importance of EOB/OOB mobility, maintaining routine, sitting up in chair, and maximizing independence with ADLs during admission   Pt completed ADLs and functional mobility for treatment session as noted above   Pt/caregiver verbalized understanding and expressed no further concerns/questions.  Updated communication board with level of assist required      Patient left up in chair with all lines intact, call button in reach, and RN present    GOALS:   Multidisciplinary Problems       Occupational Therapy Goals          Problem: Occupational Therapy    Goal Priority Disciplines Outcome Interventions   Occupational Therapy Goal     OT, PT/OT Progressing    Description: Goals to be met by: 5/28/25.     Patient will increase functional independence with ADLs by performing:    UE Dressing with Stand-by Assistance.  LE Dressing with Stand-by Assistance.  Grooming while standing at sink with Stand-by Assistance.  Toileting from toilet with Stand-by Assistance for hygiene and clothing management.   Toilet transfer to toilet with Stand-by Assistance.  Upper extremity exercise program x10 reps per handout, with independence.                         DME Justifications:  No DME recommended requiring DME justifications    Time Tracking:     OT Date of Treatment: 05/01/25  OT Start Time: 1140  OT Stop Time: 1203  OT Total Time (min): 23 min    Billable Minutes:Self Care/Home Management 23    OT/FIORELLA: OT          5/1/2025

## 2025-05-01 NOTE — ASSESSMENT & PLAN NOTE
Patient complaining of dysuria. UA positive for urinary infection with high WBC and bacteriuria. Urine cultures positive for E. Faecalis.    Plan:   - Start patient on

## 2025-05-01 NOTE — PROGRESS NOTES
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 Patient refers feeling much better than yesterday. She still has chest soreness after the CPR but is improving. Patient had urine cultures positive for E.Faecalis. Patient complains of dysuria. Will start her on antibiotic therapy to treat UTI. SW working on nursing facility placement.     Continuous Infusions:  Scheduled Meds:   aluminum-magnesium hydroxide-simethicone  30 mL Oral QID (AC & HS)    aspirin  81 mg Oral Daily    calcitRIOL  0.25 mcg Oral Daily    carvediloL  3.125 mg Oral BID WM    cycloSPORINE  50 mg Oral BID    enoxparin  30 mg Subcutaneous Q24H (prophylaxis, 1700)    NIFEdipine  60 mg Oral Daily    ondansetron        ondansetron  4 mg Intravenous Once    pantoprazole  40 mg Oral Daily    potassium bicarbonate  30 mEq Per OG tube Once    pregabalin  50 mg Oral QHS    sertraline  25 mg Oral Daily    sucralfate  1 g Oral Q6H    tiZANidine  4 mg Oral BID     PRN Meds:  Current Facility-Administered Medications:     acetaminophen, 650 mg, Oral, Q4H PRN    aluminum-magnesium hydroxide-simethicone, 30 mL, Oral, Q6H PRN    morphine, 2 mg, Intravenous, Q4H PRN    ondansetron, , ,     ondansetron, 8 mg, Oral, Q8H PRN    oxyCODONE, 5 mg, Oral, Q4H PRN    polyethylene glycol, 17 g, Oral, BID PRN    racepinephrine, 0.5 mL, Nebulization, Q4H PRN    sodium chloride 0.9%, 10 mL, Intravenous, PRN    zolpidem, 5 mg, Oral, Nightly PRN    Review of patient's allergies indicates:   Allergen Reactions    Dobutamine Other (See Comments)      Severe Left sided chest pain after dosage administered for Dobutamine Stress Test; itching    Lisinopril Swelling and Rash    Hydrocodone-acetaminophen Nausea Only    Augmentin [amoxicillin-pot clavulanate] Diarrhea    Zyvox [linezolid] Nausea And Vomiting     Objective:     Vital Signs (Most Recent):  Temp: 97.7 °F (36.5 °C) (05/01/25 0900)  Pulse: 98 (05/01/25 0900)  Resp: (!) 24 (05/01/25 0922)  BP: (!) 120/59 (05/01/25 0900)  SpO2: 96 % (05/01/25 0915) Vital Signs (24h Range):  Temp:  [97.4 °F (36.3 °C)-98.8 °F (37.1 °C)] 97.7 °F (36.5 °C)  Pulse:  [] 98  Resp:  [10-32] 24  SpO2:  [74 %-99 %] 96 %  BP: ()/() 120/59     Patient Vitals for the past 72 hrs (Last 3 readings):   Weight   05/01/25 0900 66.2 kg (146 lb)   04/29/25 0301 70 kg (154 lb 5.2 oz)   04/28/25 1030 69 kg (152 lb 1.9 oz)     Body mass index is 26.7 kg/m².      Intake/Output Summary (Last 24 hours) at 5/1/2025 1021  Last data filed at 5/1/2025 0900  Gross per 24 hour   Intake 1410.1 ml   Output 1200 ml   Net 210.1 ml       Hemodynamic Parameters:       Telemetry: NSR       Physical Exam  Constitutional:       Appearance: Normal appearance.   HENT:      Head: Normocephalic.      Mouth/Throat:      Mouth: Mucous membranes are moist.   Eyes:      Pupils: Pupils are equal, round, and reactive to light.   Cardiovascular:      Rate and Rhythm: Normal rate and regular rhythm.      Heart sounds: No murmur heard.  Pulmonary:      Effort: Pulmonary effort is normal.   Chest:      Chest wall: Tenderness present.   Abdominal:      General: Abdomen is flat.      Palpations: Abdomen is soft.   Musculoskeletal:      Right lower leg: No edema.      Left lower leg: No edema.   Neurological:      Mental Status: She is alert.            Significant Labs:  CBC:  Recent Labs   Lab 04/29/25  0356 04/30/25  0435 05/01/25  0359   WBC 3.95 5.28 3.51*   RBC 3.64* 2.98* 3.20*   HGB 10.5* 8.7* 9.2*   HCT 33.3* 27.0* 28.2*    149* 169   MCV 92 91 88  "  MCH 28.8 29.2 28.8   MCHC 31.5* 32.2 32.6     BNP:  Recent Labs   Lab 04/24/25  1343   *     CMP:  Recent Labs   Lab 04/29/25  0356 04/30/25  0435 05/01/25  0359    109 107   CALCIUM 8.7 8.3* 8.5*   ALBUMIN 3.1* 2.9* 3.1*   PROT 8.2 7.1 7.7    141 143   K 4.4 4.6 4.1   CO2 20* 15* 20*    109 109   BUN 36* 57* 55*   CREATININE 2.6* 3.5* 3.4*   ALKPHOS 130 117 127   * 208* 158*   * 175* 99*   BILITOT 0.5 0.4 0.5      Coagulation:   Recent Labs   Lab 04/27/25  0747   INR 1.0   APTT 22.5     LDH:  No results for input(s): "LDH" in the last 72 hours.  Microbiology:  Microbiology Results (last 7 days)       Procedure Component Value Units Date/Time    Urine culture [[id number]]  (Abnormal)  (Susceptibility) Collected: 04/27/25 0845    Order Status: Completed Specimen: Urine, Catheterized Updated: 05/01/25 0222     Urine Culture >100,000 cfu/ml Enterococcus faecalis    Blood culture [[id number]]  (Normal) Collected: 04/25/25 0319    Order Status: Completed Specimen: Blood Updated: 04/30/25 1802     Blood Culture No Growth After 5 Days    Blood culture [[id number]]  (Normal) Collected: 04/25/25 0324    Order Status: Completed Specimen: Blood Updated: 04/30/25 1802     Blood Culture No Growth After 5 Days    Blood culture [[id number]]  (Normal) Collected: 04/27/25 0923    Order Status: Completed Specimen: Blood from Peripheral, Antecubital, Right Updated: 04/30/25 1100     Blood Culture No Growth After 72 Hours    Blood culture [[id number]]  (Normal) Collected: 04/27/25 0942    Order Status: Completed Specimen: Blood from Peripheral, Antecubital, Left Updated: 04/30/25 1100     Blood Culture No Growth After 72 Hours            I have reviewed all pertinent labs within the past 24 hours.    Estimated Creatinine Clearance: 13.7 mL/min (A) (based on SCr of 3.4 mg/dL (H)).    Diagnostic Results:  I have reviewed and interpreted all pertinent imaging results/findings within the past 24 " hours.  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   -Last PET stress 11/2024  negative for ischemia  - Both TTE and AYAAN done.   -2D echo 4/28/25 EF: 45%, global hypokinesis, diastolic dysfunction.  RV function moderately reduced.  Mild to Mod TR. LVEDD: 4cm  - not currently candidate for angiogram given renal dysfunction  -ASA    CKD (chronic kidney disease), stage IV  -Baseline creatinine ~3     Cardiac arrest  -Unclear etiology but suspect possible CAV as cause   -Patient now starting to wake up and follow commands, CTH w/ no acute findings  -Was intubated for airway protection, now extubated.   -Pulm consulted.  Corticosteroids given overnight and patient extubated 4/29  -patient made DNR on 4/30    Hypomagnesemia  -replacing Mag and monitor renal function    Hypokalemia  -replacing electrolytes as needed while monitoring renal function    Anemia  Stable, no acute signs of bleeding    Chronic right-sided thoracic back pain  -continue home medications    Chronic idiopathic constipation  - prn laxatives    Abnormal serum thyroid stimulating hormone (TSH) level  -repeating thyroid panel    Acute cystitis without hematuria  Patient complaining of dysuria. UA positive for urinary infection with high WBC and bacteriuria. Urine cultures positive for E. Faecalis.    Plan:   - Start patient on ampicillin 1 gr Q24h and when she is ready for discharge will be transitioned to amoxicillin 500 mg Q12h to complete 5 days for treatment     Chest pain  -Multifactorial now possibly secondary to cardiac arrest     Anemia associated with stage 5 chronic renal failure  -H/H stable  -No signs of bleeding    Immunocompromised state due to drug therapy  S/p heart transplant    Essential hypertension  -Will monitor on current regimen     S/P orthotopic heart  transplant  -Transplant Date [facility] At [facility]   -CMV Status:D?R+   -Rejection status: Moderate Risk  -Last HLA/DSA 7/2/24 week DQ7  -2D echo 4/28/25 EF: 45%, global hypokinesis, diastolic dysfunction.  RV function moderately reduced.  Mild to Mod TR. LVEDD: 4cm  -Immunosuppression: cyclosporine with goal         VTE Risk Mitigation (From admission, onward)           Ordered     enoxaparin injection 30 mg  Every 24 hours         04/28/25 1612     IP VTE HIGH RISK PATIENT  Once         04/27/25 0746     Place sequential compression device  Until discontinued         04/27/25 0746     Place sequential compression device  Until discontinued         04/25/25 0002                    Mohinder Oliva MD  Cardiology  [facility]

## 2025-05-01 NOTE — EICU
Intervention Initiated From:  [facility] / EICU    Diana intervened regarding:  Rounding (Video assessment)    Virtual ICU Quality Rounds    Admit Date: 4/24/2025  Hospital Day: 6    ICU Day: 4d 7h    24H Vital Sign Range:  Temp:  [97.4 °F (36.3 °C)-98.6 °F (37 °C)]   Pulse:  []   Resp:  [10-30]   BP: ()/()   SpO2:  [74 %-99 %]     VICU Surveillance Screening    LDA reconciliation : Yes

## 2025-05-01 NOTE — ASSESSMENT & PLAN NOTE
-Unclear etiology but suspect possible CAV as cause   -Patient now starting to wake up and follow commands, CTH w/ no acute findings  -Was intubated for airway protection, now extubated.   -Pulm consulted.  Corticosteroids given overnight and patient extubated 4/29  -patient made DNR on 4/30

## 2025-05-01 NOTE — EICU
Intervention Initiated From:  COR / ANAMARIAU    Diana intervened regarding:  Rounding (Video assessment)    VICU Night Rounds Checklist  24H Vital Sign Range:  Temp:  [97.4 °F (36.3 °C)-98.8 °F (37.1 °C)]   Pulse:  []   Resp:  [10-46]   BP: ()/()   SpO2:  [74 %-100 %]     Video rounds and LDA reconciliation

## 2025-05-01 NOTE — PROGRESS NOTES
Follow-up     Received letter of consideration from Mary at [facility] ([phone]) stating that pt's screening required additional review by [facility]. Awaiting decision.     10:08  F/u with Kelsie at [facility] ([phone]) and Meagan at [facility] ([phone]) regarding referrals and informed documents were under review. Also, l/m for Chelsea at [facility] ([phone]) inquiring about referral. Will continue to f/u with referrals.    15:07  Received secure chat from DIXIE Arzola, stating pt's son, Moy, ([phone]) requested to s/w someone regarding moving pt to [place]. Informed Ness that SW will f/u with pt's son. F/u with pt's son. Pt's son stated that he was considering moving pt to [place] and inquired about pt's insurance coverage. Informed pt's son that he will have to call [facility] to have pt's coverage area changed to [place] and apply for [place] as secondary. Pt's son then stated that pt does not want to move with him. Pt's son inquired about pt's post-dc plans. Informed pt's son that [facility]A completed referrals at [facility], [facility], and [facility] (per pt's request) and was awaiting decisions. Informed pt's son that pt's insurance would also have to approve [place] SNF if accepted. Discussed alternate dc plan if pt was not able to transfer to [place] SNF that entailed home with wellness checks from HIMA Curry, at [facility] and medical alert system. Also, informed pt's son that HIMA discussed LT-PCS with pt and Antoinette, and Antoinette stated she will help pt apply for personal care attendant (PCA) services. Informed pt's son that HIMA provided pt with LT-PCS brochure. Pt's son stated that pt may have a medical alert system at home but does not use it. Pt's son also requested Antoinette's phone number, which SW provided pt's son with number ([phone]). Informed pt's son that HIMA will f/u with to confirm pt's dc disposition.    15:50  Informed by Meagan at [facility] that pt was  accepted. Also, received call from Cyndie at [facility] stating pt was denied due to their inability to transport pt to f/u appointments. Requested Meagan to submit request for auth with [employer]. Informed pt of above NHs decisions via  phone ([phone]). Pt was amenable to [facility]. Informed pt that transfer was subject to insurance approval for SNF LOC. Also, f/u with rep at [facility] ([phone]) regarding additional review. Advised to call [facility] ([phone]) regarding reviewer request for [facility] Level II screen. L/m at [facility]. Later, received call from Selina at [facility] regarding Level II screen. Informed Selina that [facility]A requested hospital exemption. Selina stated that a form needs to be signed by MD for exemption and she will email above. Selina stated that HIMA will have to complete another screen and attach signed hospital exemption form. Emailed received with form. HIMA completed form and provided to Dr. Ritchie to sign. Completed another screen and attached hospital exemption form. Awaiting 142.

## 2025-05-01 NOTE — PT/OT/SLP PROGRESS
Physical Therapy Co-Treatment    Patient Name: Ndaia Damon   MRN: [id number]    Co-treatment performed for this visit due to patient need for two skilled therapists to ensure patient and staff safety and to accommodate for patient activity tolerance/pain management   Recommendations:     Discharge Recommendations: Low Intensity Therapy  Discharge Equipment Recommendations: none  Barriers to Discharge: None    Assessment:     Nadia Damon is a 69 y.o. 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 She presents with the following impairments/functional limitations: weakness, impaired endurance, impaired self care skills, impaired functional mobility, impaired cardiopulmonary response to activity, pain. Pt presenting with HOB elevated and agreeable to completing therapy session. Pt endorsing improved chest pain as compared to previous treatment and eager to complete OOB mobility. Pt requiring limited physical assistance to complete all visualized functional mobility including brief ambulation trial in room using RW. Pt with symptomatic hypotension throughout therapeutic activities; therefore, out of room ambulation deferred to maintain pt safety. RN notified following session. Pt would continue to benefit from skilled acute PT in order to address current deficits and progress functional mobility.     Rehab Prognosis: Good; patient continues to benefit from acute skilled PT services to address these deficits and reach maximum level of function.  Recent Surgery: Procedure(s) (LRB):  Transesophageal echo (AYAAN) intra-procedure log documentation (N/A) 5 Days Post-Op    Plan:     During this hospitalization, patient to be seen 4 x/week to address the identified rehab impairments via gait training, therapeutic activities, therapeutic exercises, neuromuscular re-education and progress toward the following goals:    Plan of Care Expires:   "05/30/25    Subjective     Chief Complaint: None verbalized  Patient/Family Comments/Goals: "I'm a little dizzy but I can keep going.'  Pain/Comfort:  Pain Rating 1:  (unrated)  Location - Orientation 1: generalized  Location 1: chest  Pain Addressed 1: Reposition, Distraction  Pain Rating Post-Intervention 1:  (unrated)    Objective:     Communicated with RN prior to session. Patient found HOB elevated with blood pressure cuff, pulse ox (continuous), telemetry, PureWick upon PT entry to room.     General Precautions: Standard, fall  Orthopedic Precautions: N/A  Braces: N/A    BP Position   79/50 mmHg HOB elevated   74/46 mmHg Seated following ambulation trial, increased dizziness       Functional Mobility:  Bed Mobility:    Supine to Sit: contact guard assistance  Transfers:    Sit to Stand: x1 rep from EOB with CGA, x2 reps from bedside chair with minimal assistance, and x1 rep from bedside chair with CGA; with straight cane with cues for hand placement and foot placement  Bed to Chair: minimum assistance with straight cane with cues for hand placement and foot placement using Step Transfer  Gait: Patient ambulated 18' with straight cane and minimal assistance progressing to CGA.   Patient demonstrates occasional unsteady gait, decreased step length, narrow base of support, decreased weight shift, decreased foot clearance, flexed posture, decreased naif, decreased arm swing, and inconsistent bilateral foot placement.  Patient required cues for upright posture, gluteal activation, sequencing, increased step size, foot placement, and increased foot clearance.  All lines remained intact throughout ambulation trial, chair follow for patient safety.  Balance:   Static Sitting: Good, able to maintain for 5 minute(s) with stand by assistance  Dynamic Sitting: Good: Patient accepts moderate challenge, stand by assistance  Static Standing: Good, able to maintain for 2 minute(s) with contact guard assistance  Verbal cues " for upright posture, increased hip extension, increased knee extension, and maintenance of midline orientation  LE dressing completed while standing at bedside chair with OT  Dynamic Standing: Fair: Patient accepts minimal challenge, minimum assistance    AM-PAC 6 CLICK MOBILITY  Turning over in bed (including adjusting bedclothes, sheets and blankets)?: 3  Sitting down on and standing up from a chair with arms (e.g., wheelchair, bedside commode, etc.): 3  Moving from lying on back to sitting on the side of the bed?: 3  Moving to and from a bed to a chair (including a wheelchair)?: 3  Need to walk in hospital room?: 3  Climbing 3-5 steps with a railing?: 2  Basic Mobility Total Score: 17     Therapeutic Activities and Exercises:  Patient educated on role of acute care PT and PT POC, safety while in hospital including calling nurse for mobility, and call light usage  Pt educated on the effects of bed rest and the importance of OOB activity. Pt encouraged to sit UIC majority of day as tolerated and continue daily ambulation with nursing assist. Pt verbalized understanding.  Pt educated on importance of maximal participation in therapy session in order to reduce negative effects of prolonged sedentary positioning.   Answered all questions within PT scope of practice and addressed functional mobility concerns.    Patient left up in chair with all lines intact, call button in reach, and RN notified.    GOALS:   Multidisciplinary Problems       Physical Therapy Goals          Problem: Physical Therapy    Goal Priority Disciplines Outcome Interventions   Physical Therapy Goal     PT, PT/OT Progressing    Description: Goals to be met by: 2025     Patient will increase functional independence with mobility by performin. Supine to sit with [place]  2. Sit to supine with [place]  3. Sit to stand transfer with Supervision  4. Gait  x 100 feet with Stand-by Assistance using LRAD .   5. Lower extremity  exercise program x15 reps per handout, with independence                         DME Justifications:  No DME recommended requiring DME justifications    Time Tracking:     PT Received On: 05/01/25  PT Start Time: 1139     PT Stop Time: 1203  PT Total Time (min): 24 min     Billable Minutes: Gait Training 8 and Therapeutic Activity 16      Treatment Type: Treatment  PT/PTA: PT     Number of PTA visits since last PT visit: 0     05/01/2025

## 2025-05-02 PROBLEM — N17.9 AKI (ACUTE KIDNEY INJURY): Status: ACTIVE | Noted: 2025-05-02

## 2025-05-02 LAB
ABSOLUTE EOSINOPHIL (OHS): 0.07 K/UL
ABSOLUTE MONOCYTE (OHS): 0.36 K/UL (ref 0.3–1)
ABSOLUTE NEUTROPHIL COUNT (OHS): 2.44 K/UL (ref 1.8–7.7)
ALBUMIN SERPL BCP-MCNC: 2.9 G/DL (ref 3.5–5.2)
ALP SERPL-CCNC: 120 UNIT/L (ref 40–150)
ALT SERPL W/O P-5'-P-CCNC: 111 UNIT/L (ref 10–44)
ANION GAP (OHS): 11 MMOL/L (ref 8–16)
AST SERPL-CCNC: 66 UNIT/L (ref 11–45)
BACTERIA BLD CULT: NORMAL
BACTERIA BLD CULT: NORMAL
BASOPHILS # BLD AUTO: 0.01 K/UL
BASOPHILS NFR BLD AUTO: 0.3 %
BILIRUB SERPL-MCNC: 0.6 MG/DL (ref 0.1–1)
BUN SERPL-MCNC: 52 MG/DL (ref 8–23)
CALCIUM SERPL-MCNC: 8.1 MG/DL (ref 8.7–10.5)
CHLORIDE SERPL-SCNC: 111 MMOL/L (ref 95–110)
CO2 SERPL-SCNC: 21 MMOL/L (ref 23–29)
CREAT SERPL-MCNC: 3.8 MG/DL (ref 0.5–1.4)
CYCLOSPORINE BLD LC/MS/MS-MCNC: 148 NG/ML (ref 100–400)
ERYTHROCYTE [DISTWIDTH] IN BLOOD BY AUTOMATED COUNT: 16.3 % (ref 11.5–14.5)
GFR SERPLBLD CREATININE-BSD FMLA CKD-EPI: 12 ML/MIN/1.73/M2
GLUCOSE SERPL-MCNC: 117 MG/DL (ref 70–110)
HCT VFR BLD AUTO: 28.3 % (ref 37–48.5)
HGB BLD-MCNC: 9 GM/DL (ref 12–16)
IMM GRANULOCYTES # BLD AUTO: 0.04 K/UL (ref 0–0.04)
IMM GRANULOCYTES NFR BLD AUTO: 1.1 % (ref 0–0.5)
LYMPHOCYTES # BLD AUTO: 0.83 K/UL (ref 1–4.8)
MAGNESIUM SERPL-MCNC: 3.7 MG/DL (ref 1.6–2.6)
MCH RBC QN AUTO: 29 PG (ref 27–31)
MCHC RBC AUTO-ENTMCNC: 31.8 G/DL (ref 32–36)
MCV RBC AUTO: 91 FL (ref 82–98)
NUCLEATED RBC (/100WBC) (OHS): 0 /100 WBC
PHOSPHATE SERPL-MCNC: 1.8 MG/DL (ref 2.7–4.5)
PLATELET # BLD AUTO: 175 K/UL (ref 150–450)
PMV BLD AUTO: 9.8 FL (ref 9.2–12.9)
POTASSIUM SERPL-SCNC: 3.9 MMOL/L (ref 3.5–5.1)
PROT SERPL-MCNC: 7.3 GM/DL (ref 6–8.4)
RBC # BLD AUTO: 3.1 M/UL (ref 4–5.4)
RELATIVE EOSINOPHIL (OHS): 1.9 %
RELATIVE LYMPHOCYTE (OHS): 22.1 % (ref 18–48)
RELATIVE MONOCYTE (OHS): 9.6 % (ref 4–15)
RELATIVE NEUTROPHIL (OHS): 65 % (ref 38–73)
SODIUM SERPL-SCNC: 143 MMOL/L (ref 136–145)
WBC # BLD AUTO: 3.75 K/UL (ref 3.9–12.7)

## 2025-05-02 PROCEDURE — 25000003 PHARM REV CODE 250: Mod: HCNC | Performed by: STUDENT IN AN ORGANIZED HEALTH CARE EDUCATION/TRAINING PROGRAM

## 2025-05-02 PROCEDURE — 25000003 PHARM REV CODE 250: Mod: HCNC | Performed by: INTERNAL MEDICINE

## 2025-05-02 PROCEDURE — 83520 IMMUNOASSAY QUANT NOS NONAB: CPT | Mod: HCNC | Performed by: INTERNAL MEDICINE

## 2025-05-02 PROCEDURE — 83735 ASSAY OF MAGNESIUM: CPT | Mod: HCNC

## 2025-05-02 PROCEDURE — 25000003 PHARM REV CODE 250: Mod: HCNC

## 2025-05-02 PROCEDURE — 84100 ASSAY OF PHOSPHORUS: CPT | Mod: HCNC

## 2025-05-02 PROCEDURE — 94761 N-INVAS EAR/PLS OXIMETRY MLT: CPT | Mod: HCNC

## 2025-05-02 PROCEDURE — 63600175 PHARM REV CODE 636 W HCPCS: Mod: HCNC | Performed by: NURSE PRACTITIONER

## 2025-05-02 PROCEDURE — 63600175 PHARM REV CODE 636 W HCPCS: Mod: HCNC | Performed by: PHYSICIAN ASSISTANT

## 2025-05-02 PROCEDURE — 80158 DRUG ASSAY CYCLOSPORINE: CPT | Mod: HCNC | Performed by: INTERNAL MEDICINE

## 2025-05-02 PROCEDURE — 20600001 HC STEP DOWN PRIVATE ROOM: Mod: HCNC

## 2025-05-02 PROCEDURE — 84075 ASSAY ALKALINE PHOSPHATASE: CPT | Mod: HCNC

## 2025-05-02 PROCEDURE — 63600175 PHARM REV CODE 636 W HCPCS: Mod: HCNC

## 2025-05-02 PROCEDURE — 25000003 PHARM REV CODE 250: Mod: HCNC | Performed by: PHYSICIAN ASSISTANT

## 2025-05-02 PROCEDURE — 99233 SBSQ HOSP IP/OBS HIGH 50: CPT | Mod: HCNC,,, | Performed by: INTERNAL MEDICINE

## 2025-05-02 PROCEDURE — 85025 COMPLETE CBC W/AUTO DIFF WBC: CPT | Mod: HCNC

## 2025-05-02 PROCEDURE — 36415 COLL VENOUS BLD VENIPUNCTURE: CPT | Mod: HCNC | Performed by: INTERNAL MEDICINE

## 2025-05-02 RX ORDER — SODIUM CHLORIDE 9 MG/ML
INJECTION, SOLUTION INTRAVENOUS CONTINUOUS
Status: ACTIVE | OUTPATIENT
Start: 2025-05-02 | End: 2025-05-02

## 2025-05-02 RX ADMIN — PREGABALIN 50 MG: 50 CAPSULE ORAL at 09:05

## 2025-05-02 RX ADMIN — PANTOPRAZOLE SODIUM 40 MG: 40 TABLET, DELAYED RELEASE ORAL at 08:05

## 2025-05-02 RX ADMIN — MORPHINE SULFATE 2 MG: 2 INJECTION, SOLUTION INTRAMUSCULAR; INTRAVENOUS at 11:05

## 2025-05-02 RX ADMIN — TIZANIDINE 4 MG: 4 TABLET ORAL at 09:05

## 2025-05-02 RX ADMIN — NIFEDIPINE 60 MG: 30 TABLET, FILM COATED, EXTENDED RELEASE ORAL at 08:05

## 2025-05-02 RX ADMIN — ENOXAPARIN SODIUM 30 MG: 30 INJECTION SUBCUTANEOUS at 04:05

## 2025-05-02 RX ADMIN — SUCRALFATE 1 G: 1 SUSPENSION ORAL at 11:05

## 2025-05-02 RX ADMIN — CYCLOSPORINE 50 MG: 100 SOLUTION ORAL at 06:05

## 2025-05-02 RX ADMIN — ASPIRIN 81 MG CHEWABLE TABLET 81 MG: 81 TABLET CHEWABLE at 08:05

## 2025-05-02 RX ADMIN — ZOLPIDEM TARTRATE 5 MG: 5 TABLET, FILM COATED ORAL at 09:05

## 2025-05-02 RX ADMIN — AMPICILLIN 2 G: 2 INJECTION, POWDER, FOR SOLUTION INTRAMUSCULAR; INTRAVENOUS at 11:05

## 2025-05-02 RX ADMIN — MORPHINE SULFATE 2 MG: 2 INJECTION, SOLUTION INTRAMUSCULAR; INTRAVENOUS at 02:05

## 2025-05-02 RX ADMIN — TIZANIDINE 4 MG: 4 TABLET ORAL at 08:05

## 2025-05-02 RX ADMIN — CARVEDILOL 3.12 MG: 3.12 TABLET, FILM COATED ORAL at 08:05

## 2025-05-02 RX ADMIN — OXYCODONE 5 MG: 5 TABLET ORAL at 08:05

## 2025-05-02 RX ADMIN — MORPHINE SULFATE 2 MG: 2 INJECTION, SOLUTION INTRAMUSCULAR; INTRAVENOUS at 07:05

## 2025-05-02 RX ADMIN — SODIUM CHLORIDE: 9 INJECTION, SOLUTION INTRAVENOUS at 11:05

## 2025-05-02 RX ADMIN — CALCITRIOL CAPSULES 0.25 MCG 0.25 MCG: 0.25 CAPSULE ORAL at 08:05

## 2025-05-02 RX ADMIN — CYCLOSPORINE 50 MG: 100 SOLUTION ORAL at 08:05

## 2025-05-02 RX ADMIN — SERTRALINE HYDROCHLORIDE 25 MG: 25 TABLET ORAL at 08:05

## 2025-05-02 RX ADMIN — SUCRALFATE 1 G: 1 SUSPENSION ORAL at 05:05

## 2025-05-02 RX ADMIN — ALUMINUM HYDROXIDE, MAGNESIUM HYDROXIDE, AND SIMETHICONE 30 ML: 200; 200; 20 SUSPENSION ORAL at 05:05

## 2025-05-02 NOTE — PROGRESS NOTES
Pt to be stepped out of ICU and transferred to TSU per MD orders. Report called to RENETTA Booth of TSU.  Will transport pt shortly.    Pt admitted to and placed on portable telemetry monitor.  Pt transferred to room 64465 via bed.  Pt on RA with NS infusing as ordered.  Personal belongings, meds, and chart sent with pt.

## 2025-05-02 NOTE — PROGRESS NOTES
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 NAEON. Creatinine going up, likely in the setting of ATN after cardiac arrest. Will give fluids. Waiting for nursing facility placement.     Continuous Infusions:  Scheduled Meds:   aluminum-magnesium hydroxide-simethicone  30 mL Oral QID (AC & HS)    ampicillin IV (PEDS and ADULTS)  2 g Intravenous Q24H    aspirin  81 mg Oral Daily    calcitRIOL  0.25 mcg Oral Daily    carvediloL  3.125 mg Oral BID WM    cycloSPORINE  50 mg Oral BID    enoxparin  30 mg Subcutaneous Q24H (prophylaxis, 1700)    NIFEdipine  60 mg Oral Daily    pantoprazole  40 mg Oral Daily    potassium bicarbonate  30 mEq Per OG tube Once    pregabalin  50 mg Oral QHS    sertraline  25 mg Oral Daily    sucralfate  1 g Oral Q6H    tiZANidine  4 mg Oral BID     PRN Meds:  Current Facility-Administered Medications:     acetaminophen, 650 mg, Oral, Q4H PRN    aluminum-magnesium hydroxide-simethicone, 30 mL, Oral, Q6H PRN    morphine, 2 mg, Intravenous, Q4H PRN    ondansetron, 8 mg, Oral, Q8H PRN    oxyCODONE, 5 mg, Oral, Q4H PRN    polyethylene glycol, 17 g, Oral, BID PRN    racepinephrine, 0.5 mL, Nebulization, Q4H PRN    sodium chloride 0.9%, 10 mL, Intravenous, PRN    zolpidem, 5 mg, Oral, Nightly PRN    Review of patient's allergies indicates:   Allergen Reactions    Dobutamine Other (See Comments)     Severe Left sided chest pain after dosage administered for Dobutamine Stress Test; itching    Lisinopril Swelling and Rash    Hydrocodone-acetaminophen Nausea Only    Augmentin [amoxicillin-pot clavulanate] Diarrhea    Zyvox [linezolid] Nausea And  "Vomiting     Objective:     Vital Signs (Most Recent):  Temp: 98.9 °F (37.2 °C) (05/02/25 0845)  Pulse: 87 (05/02/25 0905)  Resp: 20 (05/02/25 0905)  BP: (!) 111/56 (05/02/25 0905)  SpO2: (!) 92 % (05/02/25 0905) Vital Signs (24h Range):  Temp:  [98.4 °F (36.9 °C)-99 °F (37.2 °C)] 98.9 °F (37.2 °C)  Pulse:  [] 87  Resp:  [12-33] 20  SpO2:  [87 %-99 %] 92 %  BP: ()/(50-76) 111/56     Patient Vitals for the past 72 hrs (Last 3 readings):   Weight   05/01/25 0900 66.2 kg (146 lb)     Body mass index is 26.7 kg/m².      Intake/Output Summary (Last 24 hours) at 5/2/2025 0940  Last data filed at 5/2/2025 0830  Gross per 24 hour   Intake 808.8 ml   Output 450 ml   Net 358.8 ml       Hemodynamic Parameters:       Telemetry: NSR       Physical Exam  Constitutional:       Appearance: Normal appearance.   HENT:      Head: Normocephalic.      Mouth/Throat:      Mouth: Mucous membranes are moist.   Eyes:      Pupils: Pupils are equal, round, and reactive to light.   Cardiovascular:      Rate and Rhythm: Normal rate and regular rhythm.      Heart sounds: No murmur heard.  Pulmonary:      Effort: Pulmonary effort is normal.   Chest:      Chest wall: Tenderness present.   Abdominal:      General: Abdomen is flat.      Palpations: Abdomen is soft.   Musculoskeletal:      Right lower leg: No edema.      Left lower leg: No edema.   Neurological:      Mental Status: She is alert.            Significant Labs:  CBC:  Recent Labs   Lab 04/30/25  0435 05/01/25  0359 05/02/25  0326   WBC 5.28 3.51* 3.75*   RBC 2.98* 3.20* 3.10*   HGB 8.7* 9.2* 9.0*   HCT 27.0* 28.2* 28.3*   * 169 175   MCV 91 88 91   MCH 29.2 28.8 29.0   MCHC 32.2 32.6 31.8*     BNP:  No results for input(s): "BNP" in the last 168 hours.    Invalid input(s): "BNPTRIAGELBLO"  CMP:  Recent Labs   Lab 04/30/25  0435 05/01/25  0359 05/02/25  0326    107 117*   CALCIUM 8.3* 8.5* 8.1*   ALBUMIN 2.9* 3.1* 2.9*   PROT 7.1 7.7 7.3    143 143   K 4.6 " "4.1 3.9   CO2 15* 20* 21*    109 111*   BUN 57* 55* 52*   CREATININE 3.5* 3.4* 3.8*   ALKPHOS 117 127 120   * 158* 111*   * 99* 66*   BILITOT 0.4 0.5 0.6      Coagulation:   Recent Labs   Lab 04/27/25  0747   INR 1.0   APTT 22.5     LDH:  No results for input(s): "LDH" in the last 72 hours.  Microbiology:  Microbiology Results (last 7 days)       Procedure Component Value Units Date/Time    Blood culture [[id number]]  (Normal) Collected: 04/27/25 0923    Order Status: Completed Specimen: Blood from Peripheral, Antecubital, Right Updated: 05/01/25 1100     Blood Culture No Growth After 96 hours    Blood culture [[id number]]  (Normal) Collected: 04/27/25 0942    Order Status: Completed Specimen: Blood from Peripheral, Antecubital, Left Updated: 05/01/25 1100     Blood Culture No Growth After 96 hours    Urine culture [[id number]]  (Abnormal)  (Susceptibility) Collected: 04/27/25 0845    Order Status: Completed Specimen: Urine, Catheterized Updated: 05/01/25 0222     Urine Culture >100,000 cfu/ml Enterococcus faecalis    Blood culture [[id number]]  (Normal) Collected: 04/25/25 0319    Order Status: Completed Specimen: Blood Updated: 04/30/25 1802     Blood Culture No Growth After 5 Days    Blood culture [[id number]]  (Normal) Collected: 04/25/25 0324    Order Status: Completed Specimen: Blood Updated: 04/30/25 1802     Blood Culture No Growth After 5 Days            I have reviewed all pertinent labs within the past 24 hours.    Estimated Creatinine Clearance: 12.3 mL/min (A) (based on SCr of 3.8 mg/dL (H)).    Diagnostic Results:  I have reviewed and interpreted all pertinent imaging results/findings within the past 24 hours.  Assessment and Plan:     Ms. Damon is a 70 y/o AAF s/p OHTx 5/27/93 at [facility], s/p PPM same date, mild anemia and leukopenia, OA, cervical spine DJD with radiculopathy and chronic neck pain who presents for her 31st post annual transplant evaluation. Four years " ago after her annual was found to have BRCA, underwent excision, chemo/radiation, had a rough time of it, with some complications related to it, however recovered well.      Transferred from [facility] ED due to chest pain. She initially was at the [facility] for pain management procedure for chronic back pain. In preprocedure area complained of nausea and chest pain having taken a sublingual nitro prior to arrival. Reported intermittent chest pain > 1 week. Took all regular medications on an empty stomach and per notes had n/v after starting IV. ECG with repol abnormalities. Noted to be hypokalemic and hypomagnesemic. Troponin I mildly elevated.     Off note, a few months ago was evaluated by [facility] for a reducible ventral hernia containing portion of the liver no bowel noted on exam or imaging. At that time also reported chronic nausea. Elective hernia repair deemed not emergent and if she should require intervention to be done at center with cardiac surgery and Cardiology heart failure services.      Cardiac PET 11/15/2024    The myocardial perfusion images show no evidence of ischemia or scar.    The whole heart absolute myocardial perfusion values were elevated at rest, low normal during stress and CFR is mildly reduced in part due to elevated resting flow.    CT attenuation images demonstrate no coronary calcifications and mild diffuse aortic calcifications in the ascending aorta, in the descending aorta and moderate calcfications in the aortic arch.    The gated perfusion images showed an ejection fraction of 56% at rest and 59% during stress. A normal ejection fraction is greater than 47%.    There is normal wall motion at rest and normal wall motion during stress.    The study's ECG is negative for ischemia.       TTE 07/20/2024:    Left Ventricle: The left ventricle is normal in size. Normal wall thickness. There is normal systolic function with a visually estimated ejection fraction of 55 - 60%. There is normal diastolic  function.    Right Ventricle: Normal right ventricular cavity size. Wall thickness is normal. Systolic function is normal.    Left Atrium: Patent foramen ovale visualized with predominant right to left shunting indicated by saline contrast.    Tricuspid Valve: There is mild regurgitation.    IVC/SVC: Normal venous pressure at 3 mmHg.    The patient is status post cardiac transplantation.  PFO not present with bubble alone, but with valsalva had very small bubbles come across         Cxr: Comparison is made to January 4, 2023.  Electrical lead overlies the lower chest and upper abdomen.  Surgical clips overlie the upper abdomen.  Mediastinal silhouette is prominent.  The lungs are clear.  No pneumothorax or significant pleural effusion     DSE 05/24/21:  The left ventricle is normal in size with concentric remodeling and normal systolic function.  The patient reached the end of the protocol.  There were no arrhythmias during stress.  Severe left atrial enlargement.  The estimated ejection fraction is 55%.  Grade II left ventricular diastolic dysfunction.  Normal right ventricular size with normal right ventricular systolic function.  Mild-to-moderate mitral regurgitation.  Mild to moderate tricuspid regurgitation.  Normal central venous pressure (3 mmHg).  The estimated PA systolic pressure is 39 mmHg.  The stress echo portion of this study is negative for myocardial ischemia.  Severe left atrial enlargement.  The ECG portion of this study is negative for myocardial ischemia.    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   -Last PET stress 11/2024  negative for ischemia  - Both TTE and AYAAN done.   -2D echo 4/28/25 EF: 45%, global hypokinesis, diastolic dysfunction.  RV function moderately reduced.  Mild to Mod TR. LVEDD: 4cm  - not currently candidate for angiogram  given renal dysfunction  -ASA    GRACE (acute kidney injury)  Patient with history of CKD, now GRACE on CKD due to ATN after cardiac arrest.   Baseline creatinine around 2.6. Last creatinine 3.8    Plan:   - Will give fluids, 500 ml   - Avoid nephrotoxins   - Daily BMP     CKD (chronic kidney disease), stage IV  -Baseline creatinine ~3     Cardiac arrest  -Unclear etiology but suspect possible CAV as cause   -Patient now starting to wake up and follow commands, CTH w/ no acute findings  -Was intubated for airway protection, now extubated.   -Pulm consulted.  Corticosteroids given overnight and patient extubated 4/29  -patient made DNR on 4/30    Hypomagnesemia  -replacing Mag and monitor renal function    Hypokalemia  -replacing electrolytes as needed while monitoring renal function    Anemia  Stable, no acute signs of bleeding    Chronic right-sided thoracic back pain  -continue home medications    Chronic idiopathic constipation  - prn laxatives    Abnormal serum thyroid stimulating hormone (TSH) level  -repeating thyroid panel    Acute cystitis without hematuria  Patient complaining of dysuria. UA positive for urinary infection with high WBC and bacteriuria. Urine cultures positive for E. Faecalis.    Plan:   - Continue ampicillin 2 gr daily for 5 days. If patient is discharged, transition to amoxicillin     Chest pain  -Multifactorial now possibly secondary to cardiac arrest     Anemia associated with stage 5 chronic renal failure  -H/H stable  -No signs of bleeding    Immunocompromised state due to drug therapy  S/p heart transplant    Essential hypertension  -Will monitor on current regimen     S/P orthotopic heart transplant  -Transplant Date [facility] At [facility]   -CMV Status:D?R+   -Rejection status: Moderate Risk  -Last HLA/DSA 7/2/24 week DQ7  -2D echo 4/28/25 EF: 45%, global hypokinesis, diastolic dysfunction.  RV function moderately reduced.  Mild to Mod TR. LVEDD: 4cm  -Immunosuppression: cyclosporine  with goal         Mhoinder Oliva MD  Heart Transplant  [facility] - Cardiac Int[facility]

## 2025-05-02 NOTE — NURSING
Patient arrived on unit @1630 with belongings including clothes,shoes and cane. Patient arrived with dinner, meal time meds given after  orienting patient to room. Patient in immersion bed, call light, bed control, belongings with reach. Patient given walker to help ambulate, reminded patient to call for assistance if she feels dizzy. Afternoon coreg held due to BP: 93/61 Map: 72. Bed low and locked,  SR up x2.

## 2025-05-02 NOTE — PLAN OF CARE
CICU DAILY GOALS       A: Awake    RASS: Goal -    Actual - RASS (Robledo Agitation-Sedation Scale): alert and calm   Restraint necessity: Clinical Justification: Treatment Interference  B: Breath   SBT: Not intubated   C: Coordinate A & B, analgesics/sedatives   Pain: managed    SAT: Not intubated  D: Delirium   CAM-ICU:    E: Early(intubated/ Progressive (non-intubated) Mobility   MOVE Screen: Pass   Activity: Activity Management: Up in chair - L3  FAS: Feeding/Nutrition   Diet order: Diet/Nutrition Received: full liquid,   Fluid restriction:    T: Thrombus   DVT prophylaxis: VTE Core Measure: Pharmacological prophylaxis initiated/maintained  H: HOB Elevation   Head of Bed (HOB) Positioning: HOB at 30 degrees  U: Ulcer Prophylaxis   GI: yes  G: Glucose control   managed    S: Skin   Bundle compliance: yes   Bathing/Skin Care: bath, complete, dressed/undressed, incontinence care, linen changed Date: 5.1.25  B: Bowel Function   no issues   I: Indwelling Catheters   Swain necessity: [REMOVED]      Urethral Catheter 04/27/25 0800-Reason for Continuing Urinary Catheterization: Critically ill in ICU and requiring hourly monitoring of intake/output   CVC necessity: No   IPAD offered: No  D: De-escalation Antibx   No  Plan for the day     Family/Goals of care/Code Status   Code Status: DNR     No acute events throughout day, VS and assessment per flow sheet, patient progressing towards goals as tolerated, plan of care reviewed with Nadia Damon and family, all concerns addressed, will continue to monitor.

## 2025-05-02 NOTE — PLAN OF CARE
Cardiac ICU Care Plan    POC reviewed with Nadia Damon. Questions and concerns addressed. Pt updating family over the phone. NS bolus being administered over 6 hours d/t elevated creatinine.  Pt being treated with IV ABX daily for infection management. Pt to step down to TSU.  Plan for discharge to NH once renal function improves.  Pt progressing toward goals. See below and flowsheets for full assessment and VS info.     Neuro:  Dennis Coma Scale  Best Eye Response: 4-->(E4) spontaneous  Best Motor Response: 6-->(M6) obeys commands  Best Verbal Response: 5-->(V5) oriented  Meka Coma Scale Score: 15  Assessment Qualifiers: patient not sedated/intubated, no eye obstruction present  Pupil PERRLA: yes    24 hr Temp:  [98.4 °F (36.9 °C)-99 °F (37.2 °C)]      CV:  Rhythm: normal sinus rhythm, conduction defect, RBBB  DVT prophylaxis: VTE Core Measure: Pharmacological prophylaxis initiated/maintained (Lovenox SubQ Daily)    Pulses  Right Radial Pulse: 2+ (normal)  Left Radial Pulse: 2+ (normal)  Right Dorsalis Pedis Pulse: 2+ (normal)  Left Dorsalis Pedis Pulse: 2+ (normal)  Right Posterior Tibial Pulse: 1+ (weak)  Left Posterior Tibial Pulse: 1+ (weak)    Resp:  Room air.    GI/:  GI prophylaxis: Protonix PO Daily  Diet/Nutrition Received: 2 gram sodium, low saturated fat/low cholesterol  Last Bowel Movement: 05/02/25  Voiding Characteristics: voids spontaneously without difficulty   Intake/Output Summary (Last 24 hours) at 5/2/2025 1554  Last data filed at 5/2/2025 1545  Gross per 24 hour   Intake 1408.89 ml   Output 250 ml   Net 1158.89 ml     Labs/Accuchecks:  Recent Labs   Lab 04/30/25  0435 05/01/25  0359 05/02/25  0326   WBC 5.28 3.51* 3.75*   RBC 2.98* 3.20* 3.10*   HGB 8.7* 9.2* 9.0*   HCT 27.0* 28.2* 28.3*   * 169 175      Recent Labs   Lab 04/27/25  0747   INR 1.0   APTT 22.5      Recent Labs     05/02/25  0326      K 3.9   CO2 21*   *   BUN 52*   CREATININE 3.8*   ALKPHOS 120   ALT  "111*   AST 66*   BILITOT 0.6     No results for input(s): "CPK", "CPKMB", "MB", "TROPONINI" in the last 168 hours. No results for input(s): "PH", "PCO2", "PO2", "HCO3", "POCSATURATED", "BE" in the last 72 hours.    Electrolytes: Contraindicated  Accuchecks: none    Gtts/LDAs:      Lines/Drains/Airways       Peripheral Intravenous Line  Duration                  Peripheral IV - Single Lumen 04/30/25 0300 22 G Distal;Left;Posterior Forearm 2 days                  Skin/Wounds  Bathing/Skin Care: bath, complete;dressed/undressed;foot care;linen changed (05/01/25 1213)      "

## 2025-05-02 NOTE — SUBJECTIVE & OBJECTIVE
Interval History: Patient doing well. NAEON. Creatinine going up, likely in the setting of ATN after cardiac arrest. Will give fluids. Waiting for nursing facility placement.     Continuous Infusions:  Scheduled Meds:   aluminum-magnesium hydroxide-simethicone  30 mL Oral QID (AC & HS)    ampicillin IV (PEDS and ADULTS)  2 g Intravenous Q24H    aspirin  81 mg Oral Daily    calcitRIOL  0.25 mcg Oral Daily    carvediloL  3.125 mg Oral BID WM    cycloSPORINE  50 mg Oral BID    enoxparin  30 mg Subcutaneous Q24H (prophylaxis, 1700)    NIFEdipine  60 mg Oral Daily    pantoprazole  40 mg Oral Daily    potassium bicarbonate  30 mEq Per OG tube Once    pregabalin  50 mg Oral QHS    sertraline  25 mg Oral Daily    sucralfate  1 g Oral Q6H    tiZANidine  4 mg Oral BID     PRN Meds:  Current Facility-Administered Medications:     acetaminophen, 650 mg, Oral, Q4H PRN    aluminum-magnesium hydroxide-simethicone, 30 mL, Oral, Q6H PRN    morphine, 2 mg, Intravenous, Q4H PRN    ondansetron, 8 mg, Oral, Q8H PRN    oxyCODONE, 5 mg, Oral, Q4H PRN    polyethylene glycol, 17 g, Oral, BID PRN    racepinephrine, 0.5 mL, Nebulization, Q4H PRN    sodium chloride 0.9%, 10 mL, Intravenous, PRN    zolpidem, 5 mg, Oral, Nightly PRN    Review of patient's allergies indicates:   Allergen Reactions    Dobutamine Other (See Comments)     Severe Left sided chest pain after dosage administered for Dobutamine Stress Test; itching    Lisinopril Swelling and Rash    Hydrocodone-acetaminophen Nausea Only    Augmentin [amoxicillin-pot clavulanate] Diarrhea    Zyvox [linezolid] Nausea And Vomiting     Objective:     Vital Signs (Most Recent):  Temp: 98.9 °F (37.2 °C) (05/02/25 0845)  Pulse: 87 (05/02/25 0905)  Resp: 20 (05/02/25 0905)  BP: (!) 111/56 (05/02/25 0905)  SpO2: (!) 92 % (05/02/25 0905) Vital Signs (24h Range):  Temp:  [98.4 °F (36.9 °C)-99 °F (37.2 °C)] 98.9 °F (37.2 °C)  Pulse:  [] 87  Resp:  [12-33] 20  SpO2:  [87 %-99 %] 92 %  BP:  "(79141)/(50-76) 111/56     Patient Vitals for the past 72 hrs (Last 3 readings):   Weight   05/01/25 0900 66.2 kg (146 lb)     Body mass index is 26.7 kg/m².      Intake/Output Summary (Last 24 hours) at 5/2/2025 0940  Last data filed at 5/2/2025 0830  Gross per 24 hour   Intake 808.8 ml   Output 450 ml   Net 358.8 ml       Hemodynamic Parameters:       Telemetry: NSR       Physical Exam  Constitutional:       Appearance: Normal appearance.   HENT:      Head: Normocephalic.      Mouth/Throat:      Mouth: Mucous membranes are moist.   Eyes:      Pupils: Pupils are equal, round, and reactive to light.   Cardiovascular:      Rate and Rhythm: Normal rate and regular rhythm.      Heart sounds: No murmur heard.  Pulmonary:      Effort: Pulmonary effort is normal.   Chest:      Chest wall: Tenderness present.   Abdominal:      General: Abdomen is flat.      Palpations: Abdomen is soft.   Musculoskeletal:      Right lower leg: No edema.      Left lower leg: No edema.   Neurological:      Mental Status: She is alert.            Significant Labs:  CBC:  Recent Labs   Lab 04/30/25 0435 05/01/25 0359 05/02/25  0326   WBC 5.28 3.51* 3.75*   RBC 2.98* 3.20* 3.10*   HGB 8.7* 9.2* 9.0*   HCT 27.0* 28.2* 28.3*   * 169 175   MCV 91 88 91   MCH 29.2 28.8 29.0   MCHC 32.2 32.6 31.8*     BNP:  No results for input(s): "BNP" in the last 168 hours.    Invalid input(s): "BNPTRIAGELBLO"  CMP:  Recent Labs   Lab 04/30/25 0435 05/01/25  0359 05/02/25  0326    107 117*   CALCIUM 8.3* 8.5* 8.1*   ALBUMIN 2.9* 3.1* 2.9*   PROT 7.1 7.7 7.3    143 143   K 4.6 4.1 3.9   CO2 15* 20* 21*    109 111*   BUN 57* 55* 52*   CREATININE 3.5* 3.4* 3.8*   ALKPHOS 117 127 120   * 158* 111*   * 99* 66*   BILITOT 0.4 0.5 0.6      Coagulation:   Recent Labs   Lab 04/27/25  0747   INR 1.0   APTT 22.5     LDH:  No results for input(s): "LDH" in the last 72 hours.  Microbiology:  Microbiology Results (last 7 days)       " Procedure Component Value Units Date/Time    Blood culture [[id number]]  (Normal) Collected: 04/27/25 0923    Order Status: Completed Specimen: Blood from Peripheral, Antecubital, Right Updated: 05/01/25 1100     Blood Culture No Growth After 96 hours    Blood culture [[id number]]  (Normal) Collected: 04/27/25 0942    Order Status: Completed Specimen: Blood from Peripheral, Antecubital, Left Updated: 05/01/25 1100     Blood Culture No Growth After 96 hours    Urine culture [[id number]]  (Abnormal)  (Susceptibility) Collected: 04/27/25 0845    Order Status: Completed Specimen: Urine, Catheterized Updated: 05/01/25 0222     Urine Culture >100,000 cfu/ml Enterococcus faecalis    Blood culture [[id number]]  (Normal) Collected: 04/25/25 0319    Order Status: Completed Specimen: Blood Updated: 04/30/25 1802     Blood Culture No Growth After 5 Days    Blood culture [[id number]]  (Normal) Collected: 04/25/25 0324    Order Status: Completed Specimen: Blood Updated: 04/30/25 1802     Blood Culture No Growth After 5 Days            I have reviewed all pertinent labs within the past 24 hours.    Estimated Creatinine Clearance: 12.3 mL/min (A) (based on SCr of 3.8 mg/dL (H)).    Diagnostic Results:  I have reviewed and interpreted all pertinent imaging results/findings within the past 24 hours.

## 2025-05-02 NOTE — ASSESSMENT & PLAN NOTE
Patient with history of CKD, now GRACE on CKD due to ATN after cardiac arrest.   Baseline creatinine around 2.6. Last creatinine 3.8    Plan:   - Will give fluids, 500 ml   - Avoid nephrotoxins   - Daily BMP

## 2025-05-02 NOTE — EICU
Intervention Initiated From:  COR / ANAMARIAU    Diana intervened regarding:  Rounding (Video assessment)    VICU Night Rounds Checklist  24H Vital Sign Range:  Temp:  [97.7 °F (36.5 °C)-99 °F (37.2 °C)]   Pulse:  []   Resp:  [10-33]   BP: ()/(51-86)   SpO2:  [93 %-99 %]     Video rounds and LDA reconciliation

## 2025-05-02 NOTE — PLAN OF CARE
Recommendations     1.) Recommend continuing with Heart Healthy diet, as tolerated, fluid per MD.      2.) If PO intake < 50% x 48hrs, consider Boost Plus BID to help meet needs.      3.) RD to monitor wt, PO intake, skin, labs.       Goals: To meet % of EEN/EPN by next RD f/u   Nutrition Goal Status: new  Communication of RD Recs:  (POC)

## 2025-05-02 NOTE — PROGRESS NOTES
Follow-up     Received notification that PASRR Level I was approved for hospital exemption via [employer]. Also, received email from Taylor at [facility] stating pt was approved from 5/2/25 to 6/11/25 and admitting facility can retrieve a copy of 142 from [employer]. L/m for [facility] ([phone]) inquiring about status on [employer] auth. Later, informed by pt's nurse, Keely, that Seth called requesting to meet with pt. Keely provided SW with Seth's phone number ([phone]) to f/u on nature of visit. HIMA s/w Seth to introduce her role and inquired about reason for requesting to meet with pt. Seth stated that pt required an OBH Level II Screen. Informed Seth that the hospital requested an exemption and because the form was not signed by an MD another screen was completed and approved. Seth blanchard called someone at his office to include in discussion, which HIMA conveyed above information. Seth was advised to still complete his assessment. Later, HIMA met with Seth outside of pt's room. Seth inquired about reason for NHP. Informed Seth that pt was going to a NH SNF due to comorbidities (post OHT, CVA, and debility). Also, informed Seth that pt had numerous falls at home. Informed Seth that pt resides alone and SNF LOC can assist pt regain her independence.     15:35  Attempts x 2 to call [facility] to inquire about [employer] auth, which HIMA received no responses and l/m. Will continue to f/u with above.     Elmer       Met with pt in room as a follow-up. Seth with OB also present. Pt was AAOx4 with pleasant affect. Informed pt again of acceptance at [facility] and awaiting insurance auth to transfer. Pt was amenable to dc disposition and voiced understanding of plan of care. Pt reports coping well and denies further needs, questions, concerns at this time and none indicated. Providing ongoing psychosocial and counseling support, education, resources, assistance and discharge planning  as indicated. Following and available.

## 2025-05-02 NOTE — ASSESSMENT & PLAN NOTE
Patient complaining of dysuria. UA positive for urinary infection with high WBC and bacteriuria. Urine cultures positive for E. Faecalis.    Plan:   - Continue ampicillin 2 gr daily for 5 days. If patient is discharged, transition to amoxicillin

## 2025-05-02 NOTE — PROGRESS NOTES
"[facility] - Transplant Stepdown  Adult Nutrition  Progress Note    SUMMARY       Recommendations    1.) Recommend continuing with Heart Healthy diet, as tolerated, fluid per MD.     2.) If PO intake < 50% x 48hrs, consider Boost Plus BID to help meet needs.     3.) RD to monitor wt, PO intake, skin, labs.      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 RD providing remote coverage. PMHx: s/p OHTx 5/27/93 at [facility], s/p PPM same date, mild anemia and leukopenia, OA, cervical spine DJD with radiculopathy and chronic neck pain, BRCA w/ excision and chemo/radiation. No noted n/v/d/c. Pt with fair to good PO intake, ~50-75% per RN staff. Per chart review, no significant changes x 1 2months. UBW appears to be 145- 150#, #. NFPE to be performed at f/u if medically warranted. RD team to continue to monitor and f/u.     Nutrition Related Social Determinants of Health: SDOH: Adequate food in home environment     Food Insecurity: No Food Insecurity (5/2/2025)    Hunger Vital Sign     Worried About Running Out of Food in the Last Year: Never true     Ran Out of Food in the Last Year: Never true      Nutrition/Diet History    Spiritual, Cultural Beliefs, Jain Practices, Values that Affect Care: no  Food Allergies: NKFA  Factors Affecting Nutritional Intake: None identified at this time    Anthropometrics    Height: 5' 2" " (157.5 cm)  Height (inches): 62 in  Height Method: Estimated  Weight: 66.2 kg (146 lb)  Weight (lb): 146 lb  Weight Method: Bed Scale  Ideal Body Weight (IBW), Female: 110 lb  % Ideal Body Weight, Female (lb): 138.29 %  BMI (Calculated): 26.7  BMI Grade: 25 - 29.9 - overweight    Lab/Procedures/Meds    Pertinent Labs Reviewed: reviewed  Pertinent Labs Comments: BUN: 52, Cr: 3.8, GFR: 12, phos: 1.8, Mg: 3.7, AST: 66, ALT: 111  Pertinent Medications Reviewed: reviewed  Pertinent Medications Comments: Abx, cyclosporine, calcitriol, nifedipine, pantoprazole, K Bicarb    Estimated/Assessed Needs    Weight Used For Calorie Calculations: 66.2 kg (145 lb 15.1 oz)  Energy Calorie Requirements (kcal): 1655- 1986 kcal  Energy Need Method: Kcal/kg (25-30 kcal/kg)  Protein Requirements: 80g (1.2g/kg)  Weight Used For Protein Calculations: 66.2 kg (145 lb 15.1 oz)  Fluid Requirements (mL): as per MD or RDA  Estimated Fluid Requirement Method: RDA Method  RDA Method (mL): 1655    Nutrition Prescription Ordered    Current Diet Order: Haert Healthy Diet  Nutrition Order Comments: 2.0L FR    Evaluation of Received Nutrient/Fluid Intake    I/O: -4.1 L since 4/24  Energy Calories Required: not meeting needs, meeting needs  Protein Required: not meeting needs  Fluid Required:  (as per MD)  Comments: LBM 5/2  Tolerance: tolerating  % Intake of Estimated Energy Needs: 75 - 100 %  % Meal Intake: 50 - 75 %    PES Statement  Nutrition PES Status: New  Nutrition PES Problem: No nutrition diagnosis at this time    Nutrition Risk    Level of Risk/Frequency of Follow-up: low     Monitor and Evaluation    Monitor and Evaluation: Food and beverage intake, Diet order, Weight, Electrolyte and renal panel, Gastrointestinal profile, Glucose/endocrine profile, Inflammatory profile, Lipid profile, Skin     Nutrition Follow-Up    RD Follow-up?: Yes

## 2025-05-02 NOTE — PLAN OF CARE
CICU DAILY GOALS     A: Awake    RASS: Goal -    Actual - RASS (Robledo Agitation-Sedation Scale): alert and calm   Restraint necessity: Clinical Justification: Treatment Interference  B: Breath   SBT: Not intubated   C: Coordinate A & B, analgesics/sedatives   Pain: managed    SAT: Not intubated  D: Delirium   CAM-ICU:    E: Early(intubated/ Progressive (non-intubated) Mobility   MOVE Screen: Pass   Activity: Activity Management: Up in chair - L3  FAS: Feeding/Nutrition   Diet order: Diet/Nutrition Received: low saturated fat/low cholesterol, 2 gram sodium, restrict fluids,   Fluid restriction: Fluid Requirement: 200mL FR   Nutritional Supplement Intake: Quantity N/A, Type: N/A  T: Thrombus   DVT prophylaxis: VTE Core Measure: Pharmacological prophylaxis initiated/maintained  H: HOB Elevation   Head of Bed (HOB) Positioning: HOB elevated  U: Ulcer Prophylaxis   GI: yes  G: Glucose control   managed    S: Skin   Bathing/Skin Care: bath, complete;dressed/undressed;foot care;linen changed (05/01/25 1902)  Wounds: No  Wound care consulted: No  B: Bowel Function   diarrhea   I: Indwelling Catheters   Swain necessity: No   CVC necessity: No  D: De-escalation Antibx   No  Plan for the day   Plan to D/C to skilled facility today.  Family/Goals of care/Code Status   Code Status: DNR     MD aware of AM lab results. No acute events throughout shift, VS and assessment per flow sheet, POC reviewed with Nadia Damon at 1900. Pt verbalized understanding. Questions and concerns addressed. No acute events today. Pt progressing toward goals. Security measures in place, plan of care to continue.    Problem: Wound  Goal: Optimal Functional Ability  Outcome: Progressing     Problem: Fall Injury Risk  Goal: Absence of Fall and Fall-Related Injury  Outcome: Progressing     Problem: Coping Ineffective  Goal: Effective Coping  Outcome: Progressing

## 2025-05-02 NOTE — EICU
Virtual ICU Quality Rounds    Admit Date: 4/24/2025  Hospital Day: 7    ICU Day: 5d 5h    24H Vital Sign Range:  Temp:  [98.4 °F (36.9 °C)-99 °F (37.2 °C)]   Pulse:  [72-95]   Resp:  [12-33]   BP: ()/(50-67)   SpO2:  [87 %-98 %]     VICU Surveillance Screening                    LDA reconciliation : Yes

## 2025-05-02 NOTE — PROGRESS NOTES
Rounds    SW attended MDT rounds with physicians, fellows, APPs, pre and post transplant coordinators, and VAD coordinators. Discussed plan of care and discharge planning.     Isaac Galicia LMSW

## 2025-05-03 PROBLEM — E83.41 HYPERMAGNESEMIA: Status: ACTIVE | Noted: 2025-05-03

## 2025-05-03 LAB
ABSOLUTE EOSINOPHIL (OHS): 0.07 K/UL
ABSOLUTE MONOCYTE (OHS): 0.36 K/UL (ref 0.3–1)
ABSOLUTE NEUTROPHIL COUNT (OHS): 3.26 K/UL (ref 1.8–7.7)
ALBUMIN SERPL BCP-MCNC: 2.7 G/DL (ref 3.5–5.2)
ALP SERPL-CCNC: 103 UNIT/L (ref 40–150)
ALT SERPL W/O P-5'-P-CCNC: 75 UNIT/L (ref 10–44)
ANION GAP (OHS): 9 MMOL/L (ref 8–16)
AST SERPL-CCNC: 46 UNIT/L (ref 11–45)
BASOPHILS # BLD AUTO: 0.02 K/UL
BASOPHILS NFR BLD AUTO: 0.5 %
BILIRUB SERPL-MCNC: 0.5 MG/DL (ref 0.1–1)
BUN SERPL-MCNC: 55 MG/DL (ref 8–23)
CALCIUM SERPL-MCNC: 7.7 MG/DL (ref 8.7–10.5)
CHLORIDE SERPL-SCNC: 110 MMOL/L (ref 95–110)
CO2 SERPL-SCNC: 23 MMOL/L (ref 23–29)
CREAT SERPL-MCNC: 4.3 MG/DL (ref 0.5–1.4)
CYCLOSPORINE BLD LC/MS/MS-MCNC: 70 NG/ML (ref 100–400)
ERYTHROCYTE [DISTWIDTH] IN BLOOD BY AUTOMATED COUNT: 16.4 % (ref 11.5–14.5)
GFR SERPLBLD CREATININE-BSD FMLA CKD-EPI: 11 ML/MIN/1.73/M2
GLUCOSE SERPL-MCNC: 105 MG/DL (ref 70–110)
HCT VFR BLD AUTO: 27.7 % (ref 37–48.5)
HGB BLD-MCNC: 8.7 GM/DL (ref 12–16)
IMM GRANULOCYTES # BLD AUTO: 0.06 K/UL (ref 0–0.04)
IMM GRANULOCYTES NFR BLD AUTO: 1.4 % (ref 0–0.5)
LYMPHOCYTES # BLD AUTO: 0.61 K/UL (ref 1–4.8)
MAGNESIUM SERPL-MCNC: 3.9 MG/DL (ref 1.6–2.6)
MCH RBC QN AUTO: 29.4 PG (ref 27–31)
MCHC RBC AUTO-ENTMCNC: 31.4 G/DL (ref 32–36)
MCV RBC AUTO: 94 FL (ref 82–98)
NUCLEATED RBC (/100WBC) (OHS): 1 /100 WBC
PHOSPHATE SERPL-MCNC: 1.1 MG/DL (ref 2.7–4.5)
PLATELET # BLD AUTO: 163 K/UL (ref 150–450)
PMV BLD AUTO: 10.5 FL (ref 9.2–12.9)
POTASSIUM SERPL-SCNC: 4.4 MMOL/L (ref 3.5–5.1)
PROT SERPL-MCNC: 6.7 GM/DL (ref 6–8.4)
RBC # BLD AUTO: 2.96 M/UL (ref 4–5.4)
RELATIVE EOSINOPHIL (OHS): 1.6 %
RELATIVE LYMPHOCYTE (OHS): 13.9 % (ref 18–48)
RELATIVE MONOCYTE (OHS): 8.2 % (ref 4–15)
RELATIVE NEUTROPHIL (OHS): 74.4 % (ref 38–73)
SODIUM SERPL-SCNC: 142 MMOL/L (ref 136–145)
WBC # BLD AUTO: 4.38 K/UL (ref 3.9–12.7)

## 2025-05-03 PROCEDURE — 20600001 HC STEP DOWN PRIVATE ROOM: Mod: HCNC

## 2025-05-03 PROCEDURE — 25000003 PHARM REV CODE 250: Mod: HCNC

## 2025-05-03 PROCEDURE — 97530 THERAPEUTIC ACTIVITIES: CPT | Mod: HCNC,CQ

## 2025-05-03 PROCEDURE — 63600175 PHARM REV CODE 636 W HCPCS: Mod: HCNC

## 2025-05-03 PROCEDURE — 80158 DRUG ASSAY CYCLOSPORINE: CPT | Mod: HCNC | Performed by: INTERNAL MEDICINE

## 2025-05-03 PROCEDURE — 84100 ASSAY OF PHOSPHORUS: CPT | Mod: HCNC

## 2025-05-03 PROCEDURE — 63600175 PHARM REV CODE 636 W HCPCS: Mod: HCNC | Performed by: NURSE PRACTITIONER

## 2025-05-03 PROCEDURE — 80053 COMPREHEN METABOLIC PANEL: CPT | Mod: HCNC

## 2025-05-03 PROCEDURE — A4216 STERILE WATER/SALINE, 10 ML: HCPCS | Mod: HCNC | Performed by: STUDENT IN AN ORGANIZED HEALTH CARE EDUCATION/TRAINING PROGRAM

## 2025-05-03 PROCEDURE — 36415 COLL VENOUS BLD VENIPUNCTURE: CPT | Mod: HCNC

## 2025-05-03 PROCEDURE — 97116 GAIT TRAINING THERAPY: CPT | Mod: HCNC,CQ

## 2025-05-03 PROCEDURE — 99233 SBSQ HOSP IP/OBS HIGH 50: CPT | Mod: HCNC,,, | Performed by: INTERNAL MEDICINE

## 2025-05-03 PROCEDURE — 25000003 PHARM REV CODE 250: Mod: HCNC | Performed by: STUDENT IN AN ORGANIZED HEALTH CARE EDUCATION/TRAINING PROGRAM

## 2025-05-03 PROCEDURE — 63600175 PHARM REV CODE 636 W HCPCS: Mod: HCNC | Performed by: PHYSICIAN ASSISTANT

## 2025-05-03 PROCEDURE — 83735 ASSAY OF MAGNESIUM: CPT | Mod: HCNC

## 2025-05-03 PROCEDURE — 85025 COMPLETE CBC W/AUTO DIFF WBC: CPT | Mod: HCNC

## 2025-05-03 PROCEDURE — 25000003 PHARM REV CODE 250: Mod: HCNC | Performed by: PHYSICIAN ASSISTANT

## 2025-05-03 PROCEDURE — 25000003 PHARM REV CODE 250: Mod: HCNC | Performed by: INTERNAL MEDICINE

## 2025-05-03 RX ORDER — SODIUM CHLORIDE 9 MG/ML
INJECTION, SOLUTION INTRAVENOUS ONCE
Status: COMPLETED | OUTPATIENT
Start: 2025-05-03 | End: 2025-05-03

## 2025-05-03 RX ADMIN — SUCRALFATE 1 G: 1 SUSPENSION ORAL at 05:05

## 2025-05-03 RX ADMIN — Medication 10 ML: at 11:05

## 2025-05-03 RX ADMIN — PREGABALIN 50 MG: 50 CAPSULE ORAL at 09:05

## 2025-05-03 RX ADMIN — SUCRALFATE 1 G: 1 SUSPENSION ORAL at 12:05

## 2025-05-03 RX ADMIN — CYCLOSPORINE 50 MG: 100 SOLUTION ORAL at 09:05

## 2025-05-03 RX ADMIN — SODIUM CHLORIDE: 9 INJECTION, SOLUTION INTRAVENOUS at 08:05

## 2025-05-03 RX ADMIN — PANTOPRAZOLE SODIUM 40 MG: 40 TABLET, DELAYED RELEASE ORAL at 08:05

## 2025-05-03 RX ADMIN — CYCLOSPORINE 50 MG: 100 SOLUTION ORAL at 05:05

## 2025-05-03 RX ADMIN — CALCITRIOL CAPSULES 0.25 MCG 0.25 MCG: 0.25 CAPSULE ORAL at 08:05

## 2025-05-03 RX ADMIN — CARVEDILOL 3.12 MG: 3.12 TABLET, FILM COATED ORAL at 05:05

## 2025-05-03 RX ADMIN — TIZANIDINE 4 MG: 4 TABLET ORAL at 09:05

## 2025-05-03 RX ADMIN — AMPICILLIN 2 G: 2 INJECTION, POWDER, FOR SOLUTION INTRAMUSCULAR; INTRAVENOUS at 01:05

## 2025-05-03 RX ADMIN — MORPHINE SULFATE 2 MG: 2 INJECTION, SOLUTION INTRAMUSCULAR; INTRAVENOUS at 11:05

## 2025-05-03 RX ADMIN — OXYCODONE 5 MG: 5 TABLET ORAL at 05:05

## 2025-05-03 RX ADMIN — ENOXAPARIN SODIUM 30 MG: 30 INJECTION SUBCUTANEOUS at 05:05

## 2025-05-03 RX ADMIN — TIZANIDINE 4 MG: 4 TABLET ORAL at 08:05

## 2025-05-03 RX ADMIN — CARVEDILOL 3.12 MG: 3.12 TABLET, FILM COATED ORAL at 08:05

## 2025-05-03 RX ADMIN — SUCRALFATE 1 G: 1 SUSPENSION ORAL at 01:05

## 2025-05-03 RX ADMIN — SERTRALINE HYDROCHLORIDE 25 MG: 25 TABLET ORAL at 08:05

## 2025-05-03 RX ADMIN — NIFEDIPINE 60 MG: 30 TABLET, FILM COATED, EXTENDED RELEASE ORAL at 08:05

## 2025-05-03 RX ADMIN — ASPIRIN 81 MG CHEWABLE TABLET 81 MG: 81 TABLET CHEWABLE at 08:05

## 2025-05-03 RX ADMIN — ZOLPIDEM TARTRATE 5 MG: 5 TABLET, FILM COATED ORAL at 09:05

## 2025-05-03 RX ADMIN — OXYCODONE 5 MG: 5 TABLET ORAL at 08:05

## 2025-05-03 NOTE — SUBJECTIVE & OBJECTIVE
Interval History: Patient doing well. NAEON. Creatinine increasing, likely in the setting of ATN after cardiac arrest. Will give fluids. Waiting for insurance authorization for nursing facility placement.     Continuous Infusions:  Scheduled Meds:   aluminum-magnesium hydroxide-simethicone  30 mL Oral QID (AC & HS)    ampicillin IV (PEDS and ADULTS)  2 g Intravenous Q24H    aspirin  81 mg Oral Daily    calcitRIOL  0.25 mcg Oral Daily    carvediloL  3.125 mg Oral BID WM    cycloSPORINE  50 mg Oral BID    enoxparin  30 mg Subcutaneous Q24H (prophylaxis, 1700)    NIFEdipine  60 mg Oral Daily    pantoprazole  40 mg Oral Daily    potassium bicarbonate  30 mEq Per OG tube Once    pregabalin  50 mg Oral QHS    sertraline  25 mg Oral Daily    sucralfate  1 g Oral Q6H    tiZANidine  4 mg Oral BID     PRN Meds:  Current Facility-Administered Medications:     acetaminophen, 650 mg, Oral, Q4H PRN    aluminum-magnesium hydroxide-simethicone, 30 mL, Oral, Q6H PRN    morphine, 2 mg, Intravenous, Q4H PRN    ondansetron, 8 mg, Oral, Q8H PRN    oxyCODONE, 5 mg, Oral, Q4H PRN    polyethylene glycol, 17 g, Oral, BID PRN    sodium chloride 0.9%, 10 mL, Intravenous, PRN    zolpidem, 5 mg, Oral, Nightly PRN    Review of patient's allergies indicates:   Allergen Reactions    Dobutamine Other (See Comments)     Severe Left sided chest pain after dosage administered for Dobutamine Stress Test; itching    Lisinopril Swelling and Rash    Hydrocodone-acetaminophen Nausea Only    Augmentin [amoxicillin-pot clavulanate] Diarrhea    Zyvox [linezolid] Nausea And Vomiting     Objective:     Vital Signs (Most Recent):  Temp: 99 °F (37.2 °C) (05/03/25 0801)  Pulse: 103 (05/03/25 0801)  Resp: 18 (05/03/25 0831)  BP: 123/66 (05/03/25 0801)  SpO2: (!) 91 % (05/03/25 0801) Vital Signs (24h Range):  Temp:  [98.4 °F (36.9 °C)-99.2 °F (37.3 °C)] 99 °F (37.2 °C)  Pulse:  [] 103  Resp:  [10-29] 18  SpO2:  [91 %-98 %] 91 %  BP: ()/(51-66) 123/66  "    Patient Vitals for the past 72 hrs (Last 3 readings):   Weight   05/01/25 0900 66.2 kg (146 lb)     Body mass index is 26.7 kg/m².      Intake/Output Summary (Last 24 hours) at 5/3/2025 0858  Last data filed at 5/3/2025 0845  Gross per 24 hour   Intake 1725.85 ml   Output 200 ml   Net 1525.85 ml       Hemodynamic Parameters:       Telemetry: NSR       Physical Exam  Constitutional:       Appearance: Normal appearance.   HENT:      Head: Normocephalic.      Mouth/Throat:      Mouth: Mucous membranes are moist.   Eyes:      Pupils: Pupils are equal, round, and reactive to light.   Cardiovascular:      Rate and Rhythm: Normal rate and regular rhythm.      Heart sounds: No murmur heard.  Pulmonary:      Effort: Pulmonary effort is normal.   Chest:      Chest wall: No tenderness.   Abdominal:      General: Abdomen is flat.      Palpations: Abdomen is soft.   Musculoskeletal:      Right lower leg: No edema.      Left lower leg: No edema.   Neurological:      Mental Status: She is alert.            Significant Labs:  CBC:  Recent Labs   Lab 05/01/25  0359 05/02/25  0326 05/03/25  0648   WBC 3.51* 3.75* 4.38   RBC 3.20* 3.10* 2.96*   HGB 9.2* 9.0* 8.7*   HCT 28.2* 28.3* 27.7*    175 163   MCV 88 91 94   MCH 28.8 29.0 29.4   MCHC 32.6 31.8* 31.4*     BNP:  No results for input(s): "BNP" in the last 168 hours.    Invalid input(s): "BNPTRIAGELBLO"  CMP:  Recent Labs   Lab 05/01/25 0359 05/02/25  0326 05/03/25  0648    117* 105   CALCIUM 8.5* 8.1* 7.7*   ALBUMIN 3.1* 2.9* 2.7*   PROT 7.7 7.3 6.7    143 142   K 4.1 3.9 4.4   CO2 20* 21* 23    111* 110   BUN 55* 52* 55*   CREATININE 3.4* 3.8* 4.3*   ALKPHOS 127 120 103   * 111* 75*   AST 99* 66* 46*   BILITOT 0.5 0.6 0.5      Coagulation:   Recent Labs   Lab 04/27/25  0747   INR 1.0   APTT 22.5     LDH:  No results for input(s): "LDH" in the last 72 hours.  Microbiology:  Microbiology Results (last 7 days)       Procedure Component Value " Units Date/Time    Blood culture [[id number]]  (Normal) Collected: 04/27/25 0923    Order Status: Completed Specimen: Blood from Peripheral, Antecubital, Right Updated: 05/02/25 1101     Blood Culture No Growth After 5 Days    Blood culture [[id number]]  (Normal) Collected: 04/27/25 0942    Order Status: Completed Specimen: Blood from Peripheral, Antecubital, Left Updated: 05/02/25 1101     Blood Culture No Growth After 5 Days    Urine culture [[id number]]  (Abnormal)  (Susceptibility) Collected: 04/27/25 0845    Order Status: Completed Specimen: Urine, Catheterized Updated: 05/01/25 0222     Urine Culture >100,000 cfu/ml Enterococcus faecalis    Blood culture [[id number]]  (Normal) Collected: 04/25/25 0319    Order Status: Completed Specimen: Blood Updated: 04/30/25 1802     Blood Culture No Growth After 5 Days    Blood culture [[id number]]  (Normal) Collected: 04/25/25 0324    Order Status: Completed Specimen: Blood Updated: 04/30/25 1802     Blood Culture No Growth After 5 Days            I have reviewed all pertinent labs within the past 24 hours.    Estimated Creatinine Clearance: 10.9 mL/min (A) (based on SCr of 4.3 mg/dL (H)).    Diagnostic Results:  I have reviewed and interpreted all pertinent imaging results/findings within the past 24 hours.

## 2025-05-03 NOTE — PLAN OF CARE
Pt AAOx4, VSS on RA. NSR on tele. Pt BP MAP remains >60. PT c/o ribcage pain r/t CPR, PRN morhine administered x1 with relief. Cont ampicillin for positive urine cx. Pt up to restroom with walker and standby assist, remains free from falls/injury. Pt voiding concentrated yellow urine, no BM overnight. Pt producing dark brown sputum, IS provided and proper use performed. Bed in lowest locked position, call light within reach, POC ongoing.

## 2025-05-03 NOTE — ASSESSMENT & PLAN NOTE
-Transplant Date 1993 At [facility]   -CMV Status:D?R+   -Rejection status: Moderate Risk  -Last HLA/DSA 7/2/24 week DQ7  -2D echo 4/28/25 EF: 45%, global hypokinesis, diastolic dysfunction.  RV function moderately reduced.  Mild to Mod TR. LVEDD: 4cm  -Immunosuppression: cyclosporine with goal

## 2025-05-03 NOTE — PT/OT/SLP PROGRESS
"Physical Therapy Treatment    Patient Name:  Nadia Damon   MRN:  [id number]    Recommendations:     Discharge Recommendations: Low Intensity Therapy  Discharge Equipment Recommendations: none  Barriers to discharge: None    Assessment:     Nadia Damon is a 69 y.o. 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  She presents with the following impairments/functional limitations: weakness, impaired endurance, impaired self care skills, impaired functional mobility, gait instability, impaired balance. Pt agreeable for therapy, states doing well    Rehab Prognosis: Fair; patient would benefit from acute skilled PT services to address these deficits and reach maximum level of function.    Recent Surgery: Procedure(s) (LRB):  Transesophageal echo (AYAAN) intra-procedure log documentation (N/A) 7 Days Post-Op    Plan:     During this hospitalization, patient to be seen 4 x/week to address the identified rehab impairments via gait training, therapeutic activities, therapeutic exercises, neuromuscular re-education and progress toward the following goals:    Plan of Care Expires:  05/30/25    Subjective     Chief Complaint: fatigue  Patient/Family Comments/goals: "You gotta keep my mind off this pain"  Pain/Comfort:  Pain Rating 1: 6/10  Location - Orientation 1: generalized  Location 1: chest  Pain Addressed 1: Reposition, Distraction  Pain Rating Post-Intervention 1: other (see comments) (unrated)      Objective:     Communicated with RN prior to session.  Patient found supine with blood pressure cuff, pulse ox (continuous), telemetry, PureWick upon PT entry to room.     General Precautions: Standard, fall  Orthopedic Precautions: N/A  Braces: N/A  Respiratory Status: Room air     Functional Mobility:    Bed Mobility:   Rolling: to R side with stand by assistance  Supine > Sit: stand by assistance  Sit > Supine: stand by assistance    Transfers: "   Sit <> Stand Transfer: contact guard assistance from EOB using rolling walker     Balance:   Sitting balance: GOOD: Maintains balance through MODERATE excursions of active trunk movement  Standing balance:   FAIR: Maintains without assist but unable to take challenges  FAIR: Needs CONTACT GUARD during gait                 Gait:  Distance: 200'   Assistive Device: RW  Assistance Level: contact guard assistance  Gait Assessment: slow naif, pt takes step through steps, occasional VC's for posture, able to converse during gait, mild SOB after activity    BP:  116/61 sitting  108/62 standing          AM-PAC 6 CLICK MOBILITY  Turning over in bed (including adjusting bedclothes, sheets and blankets)?: 3  Sitting down on and standing up from a chair with arms (e.g., wheelchair, bedside commode, etc.): 3  Moving from lying on back to sitting on the side of the bed?: 3  Moving to and from a bed to a chair (including a wheelchair)?: 3  Need to walk in hospital room?: 3  Climbing 3-5 steps with a railing?: 2  Basic Mobility Total Score: 17       Treatment & Education:  Education:  PT role and PoC to increase strength and endurance    Patient left supine with all lines intact and call button in reach..    GOALS:   Multidisciplinary Problems       Physical Therapy Goals          Problem: Physical Therapy    Goal Priority Disciplines Outcome Interventions   Physical Therapy Goal     PT, PT/OT Progressing    Description: Goals to be met by: 2025     Patient will increase functional independence with mobility by performin. Supine to sit with Clare  2. Sit to supine with Clare  3. Sit to stand transfer with Supervision  4. Gait  x 100 feet with Stand-by Assistance using LRAD .   5. Lower extremity exercise program x15 reps per handout, with independence                         DME Justifications:  No DME recommended requiring DME justifications    Time Tracking:     PT Received On: 25  PT Start  Time: 1020     PT Stop Time: 1052  PT Total Time (min): 32 min     Billable Minutes: Gait Training 16 and Therapeutic Activity 16    Treatment Type: Treatment  PT/PTA: PTA     Number of PTA visits since last PT visit: 1 05/03/2025

## 2025-05-03 NOTE — ASSESSMENT & PLAN NOTE
Patient complaining of dysuria. UA positive for urinary infection with high WBC and bacteriuria. Urine cultures positive for E. Faecalis.    Plan:   - Continue ampicillin 2 gr daily for 5 days [5/1 -5/6]. If patient is discharged, transition to amoxicillin

## 2025-05-03 NOTE — PROGRESS NOTES
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 NAEON. Creatinine increasing, likely in the setting of ATN after cardiac arrest. Will give fluids. Waiting for insurance authorization for nursing facility placement.     Continuous Infusions:  Scheduled Meds:   aluminum-magnesium hydroxide-simethicone  30 mL Oral QID (AC & HS)    ampicillin IV (PEDS and ADULTS)  2 g Intravenous Q24H    aspirin  81 mg Oral Daily    calcitRIOL  0.25 mcg Oral Daily    carvediloL  3.125 mg Oral BID WM    cycloSPORINE  50 mg Oral BID    enoxparin  30 mg Subcutaneous Q24H (prophylaxis, 1700)    NIFEdipine  60 mg Oral Daily    pantoprazole  40 mg Oral Daily    potassium bicarbonate  30 mEq Per OG tube Once    pregabalin  50 mg Oral QHS    sertraline  25 mg Oral Daily    sucralfate  1 g Oral Q6H    tiZANidine  4 mg Oral BID     PRN Meds:  Current Facility-Administered Medications:     acetaminophen, 650 mg, Oral, Q4H PRN    aluminum-magnesium hydroxide-simethicone, 30 mL, Oral, Q6H PRN    morphine, 2 mg, Intravenous, Q4H PRN    ondansetron, 8 mg, Oral, Q8H PRN    oxyCODONE, 5 mg, Oral, Q4H PRN    polyethylene glycol, 17 g, Oral, BID PRN    sodium chloride 0.9%, 10 mL, Intravenous, PRN    zolpidem, 5 mg, Oral, Nightly PRN    Review of patient's allergies indicates:   Allergen Reactions    Dobutamine Other (See Comments)     Severe Left sided chest pain after dosage administered for Dobutamine Stress Test; itching    Lisinopril Swelling and Rash    Hydrocodone-acetaminophen Nausea Only    Augmentin [amoxicillin-pot clavulanate] Diarrhea    Zyvox [linezolid] Nausea And Vomiting     Objective:  "    Vital Signs (Most Recent):  Temp: 99 °F (37.2 °C) (05/03/25 0801)  Pulse: 103 (05/03/25 0801)  Resp: 18 (05/03/25 0831)  BP: 123/66 (05/03/25 0801)  SpO2: (!) 91 % (05/03/25 0801) Vital Signs (24h Range):  Temp:  [98.4 °F (36.9 °C)-99.2 °F (37.3 °C)] 99 °F (37.2 °C)  Pulse:  [] 103  Resp:  [10-29] 18  SpO2:  [91 %-98 %] 91 %  BP: ()/(51-66) 123/66     Patient Vitals for the past 72 hrs (Last 3 readings):   Weight   05/01/25 0900 66.2 kg (146 lb)     Body mass index is 26.7 kg/m².      Intake/Output Summary (Last 24 hours) at 5/3/2025 0858  Last data filed at 5/3/2025 0845  Gross per 24 hour   Intake 1725.85 ml   Output 200 ml   Net 1525.85 ml       Hemodynamic Parameters:       Telemetry: NSR       Physical Exam  Constitutional:       Appearance: Normal appearance.   HENT:      Head: Normocephalic.      Mouth/Throat:      Mouth: Mucous membranes are moist.   Eyes:      Pupils: Pupils are equal, round, and reactive to light.   Cardiovascular:      Rate and Rhythm: Normal rate and regular rhythm.      Heart sounds: No murmur heard.  Pulmonary:      Effort: Pulmonary effort is normal.   Chest:      Chest wall: No tenderness.   Abdominal:      General: Abdomen is flat.      Palpations: Abdomen is soft.   Musculoskeletal:      Right lower leg: No edema.      Left lower leg: No edema.   Neurological:      Mental Status: She is alert.            Significant Labs:  CBC:  Recent Labs   Lab 05/01/25  0359 05/02/25  0326 05/03/25  0648   WBC 3.51* 3.75* 4.38   RBC 3.20* 3.10* 2.96*   HGB 9.2* 9.0* 8.7*   HCT 28.2* 28.3* 27.7*    175 163   MCV 88 91 94   MCH 28.8 29.0 29.4   MCHC 32.6 31.8* 31.4*     BNP:  No results for input(s): "BNP" in the last 168 hours.    Invalid input(s): "BNPTRIAGELBLO"  CMP:  Recent Labs   Lab 05/01/25  0359 05/02/25  0326 05/03/25  0648    117* 105   CALCIUM 8.5* 8.1* 7.7*   ALBUMIN 3.1* 2.9* 2.7*   PROT 7.7 7.3 6.7    143 142   K 4.1 3.9 4.4   CO2 20* 21* 23   CL " "109 111* 110   BUN 55* 52* 55*   CREATININE 3.4* 3.8* 4.3*   ALKPHOS 127 120 103   * 111* 75*   AST 99* 66* 46*   BILITOT 0.5 0.6 0.5      Coagulation:   Recent Labs   Lab 04/27/25  0747   INR 1.0   APTT 22.5     LDH:  No results for input(s): "LDH" in the last 72 hours.  Microbiology:  Microbiology Results (last 7 days)       Procedure Component Value Units Date/Time    Blood culture [[id number]]  (Normal) Collected: 04/27/25 0923    Order Status: Completed Specimen: Blood from Peripheral, Antecubital, Right Updated: 05/02/25 1101     Blood Culture No Growth After 5 Days    Blood culture [[id number]]  (Normal) Collected: 04/27/25 0942    Order Status: Completed Specimen: Blood from Peripheral, Antecubital, Left Updated: 05/02/25 1101     Blood Culture No Growth After 5 Days    Urine culture [[id number]]  (Abnormal)  (Susceptibility) Collected: 04/27/25 0845    Order Status: Completed Specimen: Urine, Catheterized Updated: 05/01/25 0222     Urine Culture >100,000 cfu/ml Enterococcus faecalis    Blood culture [[id number]]  (Normal) Collected: 04/25/25 0319    Order Status: Completed Specimen: Blood Updated: 04/30/25 1802     Blood Culture No Growth After 5 Days    Blood culture [[id number]]  (Normal) Collected: 04/25/25 0324    Order Status: Completed Specimen: Blood Updated: 04/30/25 1802     Blood Culture No Growth After 5 Days            I have reviewed all pertinent labs within the past 24 hours.    Estimated Creatinine Clearance: 10.9 mL/min (A) (based on SCr of 4.3 mg/dL (H)).    Diagnostic Results:  I have reviewed and interpreted all pertinent imaging results/findings within the past 24 hours.  Assessment and Plan:     Ms. Damon is a 70 y/o AAF s/p OHTx 5/27/93 at [facility], s/p PPM same date, mild anemia and leukopenia, OA, cervical spine DJD with radiculopathy and chronic neck pain who presents for her 31st post annual transplant evaluation. Four years ago after her annual was found to have " BRCA, underwent excision, chemo/radiation, had a rough time of it, with some complications related to it, however recovered well.      Transferred from [facility] ED due to chest pain. She initially was at the [facility] for pain management procedure for chronic back pain. In preprocedure area complained of nausea and chest pain having taken a sublingual nitro prior to arrival. Reported intermittent chest pain > 1 week. Took all regular medications on an empty stomach and per notes had n/v after starting IV. ECG with repol abnormalities. Noted to be hypokalemic and hypomagnesemic. Troponin I mildly elevated.     Off note, a few months ago was evaluated by [facility] for a reducible ventral hernia containing portion of the liver no bowel noted on exam or imaging. At that time also reported chronic nausea. Elective hernia repair deemed not emergent and if she should require intervention to be done at center with cardiac surgery and Cardiology heart failure services.      Cardiac PET 11/15/2024    The myocardial perfusion images show no evidence of ischemia or scar.    The whole heart absolute myocardial perfusion values were elevated at rest, low normal during stress and CFR is mildly reduced in part due to elevated resting flow.    CT attenuation images demonstrate no coronary calcifications and mild diffuse aortic calcifications in the ascending aorta, in the descending aorta and moderate calcfications in the aortic arch.    The gated perfusion images showed an ejection fraction of 56% at rest and 59% during stress. A normal ejection fraction is greater than 47%.    There is normal wall motion at rest and normal wall motion during stress.    The study's ECG is negative for ischemia.       TTE 07/20/2024:    Left Ventricle: The left ventricle is normal in size. Normal wall thickness. There is normal systolic function with a visually estimated ejection fraction of 55 - 60%. There is normal diastolic function.    Right Ventricle: Normal  right ventricular cavity size. Wall thickness is normal. Systolic function is normal.    Left Atrium: Patent foramen ovale visualized with predominant right to left shunting indicated by saline contrast.    Tricuspid Valve: There is mild regurgitation.    IVC/SVC: Normal venous pressure at 3 mmHg.    The patient is status post cardiac transplantation.  PFO not present with bubble alone, but with valsalva had very small bubbles come across         Cxr: Comparison is made to January 4, 2023.  Electrical lead overlies the lower chest and upper abdomen.  Surgical clips overlie the upper abdomen.  Mediastinal silhouette is prominent.  The lungs are clear.  No pneumothorax or significant pleural effusion     DSE 05/24/21:  The left ventricle is normal in size with concentric remodeling and normal systolic function.  The patient reached the end of the protocol.  There were no arrhythmias during stress.  Severe left atrial enlargement.  The estimated ejection fraction is 55%.  Grade II left ventricular diastolic dysfunction.  Normal right ventricular size with normal right ventricular systolic function.  Mild-to-moderate mitral regurgitation.  Mild to moderate tricuspid regurgitation.  Normal central venous pressure (3 mmHg).  The estimated PA systolic pressure is 39 mmHg.  The stress echo portion of this study is negative for myocardial ischemia.  Severe left atrial enlargement.  The ECG portion of this study is negative for myocardial ischemia.    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   -Last PET stress 11/2024  negative for ischemia  - Both TTE and AYAAN done.   -2D echo 4/28/25 EF: 45%, global hypokinesis, diastolic dysfunction.  RV function moderately reduced.  Mild to Mod TR. LVEDD: 4cm  - not currently candidate for angiogram given renal  dysfunction  -ASA    Hypermagnesemia  Stop Maalox  Will monitor    GRACE (acute kidney injury)  Patient with history of CKD, now GRACE on CKD due to ATN after cardiac arrest.   Baseline creatinine around 2.6. Last creatinine 3.8    Plan:   - Will give fluids, 500 ml   - Avoid nephrotoxins   - Daily BMP     CKD (chronic kidney disease), stage IV  -Baseline creatinine ~3   -    Latest Reference Range & Units 04/25/25 03:19 04/26/25 05:33 04/27/25 07:47 04/27/25 21:31 04/28/25 04:06 04/29/25 03:56 04/30/25 04:35 05/01/25 03:59 05/02/25 03:26 05/03/25 06:48   Creatinine 0.5 - 1.4 mg/dL 3.0 (H) 2.9 (H) 3.1 (H) 2.8 (H) 2.8 (H) 2.6 (H) 3.5 (H) 3.4 (H) 3.8 (H) 4.3 (H)     - GRACE on CKD likely the setting of ATN after cardiac arrest.  - Give NS 80 cc/hr for total 500 cc    Cardiac arrest  -Unclear etiology but suspect possible CAV as cause   -Patient now starting to wake up and follow commands, CTH w/ no acute findings  -Was intubated for airway protection, now extubated.   -Pulm consulted.  Corticosteroids given overnight and patient extubated 4/29  -patient made DNR on 4/30    Hypomagnesemia  -replacing Mag and monitor renal function    Hypokalemia  -replacing electrolytes as needed while monitoring renal function    Anemia  Stable, no acute signs of bleeding    Chronic right-sided thoracic back pain  -continue home medications    Chronic idiopathic constipation  - prn laxatives    Abnormal serum thyroid stimulating hormone (TSH) level  -repeating thyroid panel    Acute cystitis without hematuria  Patient complaining of dysuria. UA positive for urinary infection with high WBC and bacteriuria. Urine cultures positive for E. Faecalis.    Plan:   - Continue ampicillin 2 gr daily for 5 days [5/1 -5/6]. If patient is discharged, transition to amoxicillin     Chest pain  -Multifactorial now possibly secondary to cardiac arrest     Anemia associated with stage 5 chronic renal failure  -H/H stable  -No signs of  bleeding    Immunocompromised state due to drug therapy  S/p heart transplant  On Cyclosporin    Essential hypertension  -Will monitor on current regimen     S/P orthotopic heart transplant  -Transplant Date [facility]   -CMV Status:D?R+   -Rejection status: Moderate Risk  -Last HLA/DSA 7/2/24 week DQ7  -2D echo 4/28/25 EF: 45%, global hypokinesis, diastolic dysfunction.  RV function moderately reduced.  Mild to Mod TR. LVEDD: 4cm  -Immunosuppression: cyclosporine with goal         Yohannes Causey MD  Heart Transplant  [facility] - Transplant Stepdown

## 2025-05-03 NOTE — ASSESSMENT & PLAN NOTE
-Baseline creatinine ~3   -    Latest Reference Range & Units 04/25/25 03:19 04/26/25 05:33 04/27/25 07:47 04/27/25 21:31 04/28/25 04:06 04/29/25 03:56 04/30/25 04:35 05/01/25 03:59 05/02/25 03:26 05/03/25 06:48   Creatinine 0.5 - 1.4 mg/dL 3.0 (H) 2.9 (H) 3.1 (H) 2.8 (H) 2.8 (H) 2.6 (H) 3.5 (H) 3.4 (H) 3.8 (H) 4.3 (H)     - GRACE on CKD likely the setting of ATN after cardiac arrest.  - Give NS 80 cc/hr for total 500 cc

## 2025-05-04 LAB
ABSOLUTE EOSINOPHIL (OHS): 0.18 K/UL
ABSOLUTE MONOCYTE (OHS): 0.43 K/UL (ref 0.3–1)
ABSOLUTE NEUTROPHIL COUNT (OHS): 2.2 K/UL (ref 1.8–7.7)
ALBUMIN SERPL BCP-MCNC: 2.7 G/DL (ref 3.5–5.2)
ALP SERPL-CCNC: 105 UNIT/L (ref 40–150)
ALT SERPL W/O P-5'-P-CCNC: 54 UNIT/L (ref 10–44)
ANION GAP (OHS): 11 MMOL/L (ref 8–16)
AST SERPL-CCNC: 32 UNIT/L (ref 11–45)
BASOPHILS # BLD AUTO: 0.01 K/UL
BASOPHILS NFR BLD AUTO: 0.3 %
BILIRUB SERPL-MCNC: 0.6 MG/DL (ref 0.1–1)
BUN SERPL-MCNC: 54 MG/DL (ref 8–23)
CALCIUM SERPL-MCNC: 7.8 MG/DL (ref 8.7–10.5)
CHLORIDE SERPL-SCNC: 105 MMOL/L (ref 95–110)
CO2 SERPL-SCNC: 20 MMOL/L (ref 23–29)
CREAT SERPL-MCNC: 3.7 MG/DL (ref 0.5–1.4)
CYCLOSPORINE BLD LC/MS/MS-MCNC: 150 NG/ML (ref 100–400)
ERYTHROCYTE [DISTWIDTH] IN BLOOD BY AUTOMATED COUNT: 16 % (ref 11.5–14.5)
GFR SERPLBLD CREATININE-BSD FMLA CKD-EPI: 13 ML/MIN/1.73/M2
GLUCOSE SERPL-MCNC: 94 MG/DL (ref 70–110)
HCT VFR BLD AUTO: 24.4 % (ref 37–48.5)
HGB BLD-MCNC: 7.9 GM/DL (ref 12–16)
IMM GRANULOCYTES # BLD AUTO: 0.03 K/UL (ref 0–0.04)
IMM GRANULOCYTES NFR BLD AUTO: 0.8 % (ref 0–0.5)
LYMPHOCYTES # BLD AUTO: 0.73 K/UL (ref 1–4.8)
MAGNESIUM SERPL-MCNC: 3.3 MG/DL (ref 1.6–2.6)
MCH RBC QN AUTO: 28.9 PG (ref 27–31)
MCHC RBC AUTO-ENTMCNC: 32.4 G/DL (ref 32–36)
MCV RBC AUTO: 89 FL (ref 82–98)
NUCLEATED RBC (/100WBC) (OHS): 0 /100 WBC
PHOSPHATE SERPL-MCNC: 1 MG/DL (ref 2.7–4.5)
PLATELET # BLD AUTO: 155 K/UL (ref 150–450)
PMV BLD AUTO: 10.2 FL (ref 9.2–12.9)
POTASSIUM SERPL-SCNC: 3.8 MMOL/L (ref 3.5–5.1)
PROT SERPL-MCNC: 6.7 GM/DL (ref 6–8.4)
RBC # BLD AUTO: 2.73 M/UL (ref 4–5.4)
RELATIVE EOSINOPHIL (OHS): 5 %
RELATIVE LYMPHOCYTE (OHS): 20.4 % (ref 18–48)
RELATIVE MONOCYTE (OHS): 12 % (ref 4–15)
RELATIVE NEUTROPHIL (OHS): 61.5 % (ref 38–73)
SODIUM SERPL-SCNC: 136 MMOL/L (ref 136–145)
WBC # BLD AUTO: 3.58 K/UL (ref 3.9–12.7)

## 2025-05-04 PROCEDURE — 84100 ASSAY OF PHOSPHORUS: CPT | Mod: HCNC

## 2025-05-04 PROCEDURE — 25000003 PHARM REV CODE 250: Mod: HCNC | Performed by: STUDENT IN AN ORGANIZED HEALTH CARE EDUCATION/TRAINING PROGRAM

## 2025-05-04 PROCEDURE — 25000003 PHARM REV CODE 250: Mod: HCNC | Performed by: INTERNAL MEDICINE

## 2025-05-04 PROCEDURE — 85025 COMPLETE CBC W/AUTO DIFF WBC: CPT | Mod: HCNC

## 2025-05-04 PROCEDURE — 80158 DRUG ASSAY CYCLOSPORINE: CPT | Mod: HCNC | Performed by: INTERNAL MEDICINE

## 2025-05-04 PROCEDURE — 25000003 PHARM REV CODE 250: Mod: HCNC | Performed by: PHYSICIAN ASSISTANT

## 2025-05-04 PROCEDURE — 83735 ASSAY OF MAGNESIUM: CPT | Mod: HCNC

## 2025-05-04 PROCEDURE — 80053 COMPREHEN METABOLIC PANEL: CPT | Mod: HCNC

## 2025-05-04 PROCEDURE — 20600001 HC STEP DOWN PRIVATE ROOM: Mod: HCNC

## 2025-05-04 PROCEDURE — 36415 COLL VENOUS BLD VENIPUNCTURE: CPT | Mod: HCNC | Performed by: INTERNAL MEDICINE

## 2025-05-04 PROCEDURE — 63600175 PHARM REV CODE 636 W HCPCS: Mod: HCNC | Performed by: PHYSICIAN ASSISTANT

## 2025-05-04 PROCEDURE — 99233 SBSQ HOSP IP/OBS HIGH 50: CPT | Mod: HCNC,,, | Performed by: INTERNAL MEDICINE

## 2025-05-04 RX ORDER — SODIUM,POTASSIUM PHOSPHATES 280-250MG
1 POWDER IN PACKET (EA) ORAL ONCE
Status: DISCONTINUED | OUTPATIENT
Start: 2025-05-04 | End: 2025-05-04

## 2025-05-04 RX ORDER — CETIRIZINE HYDROCHLORIDE 5 MG/1
5 TABLET ORAL ONCE
Status: COMPLETED | OUTPATIENT
Start: 2025-05-04 | End: 2025-05-04

## 2025-05-04 RX ADMIN — DEXTROSE MONOHYDRATE 30 MMOL: 25000 INJECTION, SOLUTION INTRAVENOUS at 04:05

## 2025-05-04 RX ADMIN — ACETAMINOPHEN 650 MG: 325 TABLET ORAL at 01:05

## 2025-05-04 RX ADMIN — CALCITRIOL CAPSULES 0.25 MCG 0.25 MCG: 0.25 CAPSULE ORAL at 09:05

## 2025-05-04 RX ADMIN — ZOLPIDEM TARTRATE 5 MG: 5 TABLET, FILM COATED ORAL at 10:05

## 2025-05-04 RX ADMIN — CYCLOSPORINE 50 MG: 100 SOLUTION ORAL at 06:05

## 2025-05-04 RX ADMIN — CARVEDILOL 3.12 MG: 3.12 TABLET, FILM COATED ORAL at 07:05

## 2025-05-04 RX ADMIN — OXYCODONE 5 MG: 5 TABLET ORAL at 07:05

## 2025-05-04 RX ADMIN — CARVEDILOL 3.12 MG: 3.12 TABLET, FILM COATED ORAL at 04:05

## 2025-05-04 RX ADMIN — TIZANIDINE 4 MG: 4 TABLET ORAL at 08:05

## 2025-05-04 RX ADMIN — CYCLOSPORINE 50 MG: 100 SOLUTION ORAL at 07:05

## 2025-05-04 RX ADMIN — SUCRALFATE 1 G: 1 SUSPENSION ORAL at 01:05

## 2025-05-04 RX ADMIN — ACETAMINOPHEN 650 MG: 325 TABLET ORAL at 08:05

## 2025-05-04 RX ADMIN — SUCRALFATE 1 G: 1 SUSPENSION ORAL at 05:05

## 2025-05-04 RX ADMIN — GUAIFENESIN, DEXTROMETHORPHAN HBR 1 TABLET: 600; 30 TABLET ORAL at 08:05

## 2025-05-04 RX ADMIN — NIFEDIPINE 60 MG: 30 TABLET, FILM COATED, EXTENDED RELEASE ORAL at 07:05

## 2025-05-04 RX ADMIN — SUCRALFATE 1 G: 1 SUSPENSION ORAL at 11:05

## 2025-05-04 RX ADMIN — PREGABALIN 50 MG: 50 CAPSULE ORAL at 08:05

## 2025-05-04 RX ADMIN — ENOXAPARIN SODIUM 30 MG: 30 INJECTION SUBCUTANEOUS at 04:05

## 2025-05-04 RX ADMIN — CETIRIZINE HYDROCHLORIDE 5 MG: 5 TABLET, FILM COATED ORAL at 09:05

## 2025-05-04 RX ADMIN — SERTRALINE HYDROCHLORIDE 25 MG: 25 TABLET ORAL at 07:05

## 2025-05-04 RX ADMIN — TIZANIDINE 4 MG: 4 TABLET ORAL at 09:05

## 2025-05-04 RX ADMIN — GUAIFENESIN, DEXTROMETHORPHAN HBR 1 TABLET: 600; 30 TABLET ORAL at 09:05

## 2025-05-04 RX ADMIN — ASPIRIN 81 MG CHEWABLE TABLET 81 MG: 81 TABLET CHEWABLE at 07:05

## 2025-05-04 RX ADMIN — SUCRALFATE 1 G: 1 SUSPENSION ORAL at 12:05

## 2025-05-04 RX ADMIN — PANTOPRAZOLE SODIUM 40 MG: 40 TABLET, DELAYED RELEASE ORAL at 07:05

## 2025-05-04 NOTE — ASSESSMENT & PLAN NOTE
-Baseline creatinine ~3   - GRACE on CKD likely the setting of ATN after cardiac arrest as well as CKD V (per Nephrology its likely due to chronic calcineurin exposure and working for creating AV fistula)

## 2025-05-04 NOTE — PLAN OF CARE
Patient is AAOX4, calm, cooperative and accepting. VSS. Afebrile. Spo2 maintained@RA. Systolic BP 90s- 110s this shift. NSR on Tele.  Scheduled medicines given as per MAR. Antibiotic Ampicillin 2gm contd Q24. Tylenol given for abdominal pain along with some hot packs, moderate relief obtained.  Ambulated up to toilet with a walker with stand by assist. Free from falls and injuries this shift. Bed in low position, wheels locked, call light within patient's reach. Aware of calling for assistance. Slept on/off.

## 2025-05-04 NOTE — PLAN OF CARE
AAOx4, patient worked with PT this morning. She was OOB in bedside chair for most of the day. Reminded her she needed to eat to keep  up her strength. She did have a poor appetite today. She is keeping within her 2L restrictions consciously. HTS team made aware patient (@1744) was having a productive cough  that was thick yellow mucus. Team ordered CXR, and instructed nurse to follow up with night HTS. Night nurse aware and will follow up. Parameter's put in for her BP due to her being dizzy and weak occasionally while standing up. Orthostatic's were taken, and patient is not orthostatic. Patient ambulating ok with walker, but remain's standby assist as needed. She is reminded to call if she needs assistance. She still has pain on her ribs due to having CPR previously and is being managed with her oxy 5mg (given X2) can have Q4, and 1X dose  of morphine 2mg given this shift. She is currently OOB, with chair llocked, call light with reach as well as belongings.

## 2025-05-04 NOTE — SUBJECTIVE & OBJECTIVE
Interval History: Reports cough with mucus and itching. No fever, burningn urine. Patient doing well. NAEON. Creatinine increasing, likely in the setting of ATN after cardiac arrest as well she has CKD V. Waiting for insurance authorization for nursing facility placement.     Continuous Infusions:  Scheduled Meds:   aspirin  81 mg Oral Daily    calcitRIOL  0.25 mcg Oral Daily    carvediloL  3.125 mg Oral BID WM    cetirizine  5 mg Oral Once    cycloSPORINE  50 mg Oral BID    dextromethorphan-guaiFENesin  mg  1 tablet Oral BID    enoxparin  30 mg Subcutaneous Q24H (prophylaxis, 1700)    NIFEdipine  60 mg Oral Daily    pantoprazole  40 mg Oral Daily    potassium bicarbonate  30 mEq Per OG tube Once    pregabalin  50 mg Oral QHS    sertraline  25 mg Oral Daily    sucralfate  1 g Oral Q6H    tiZANidine  4 mg Oral BID     PRN Meds:  Current Facility-Administered Medications:     acetaminophen, 650 mg, Oral, Q4H PRN    aluminum-magnesium hydroxide-simethicone, 30 mL, Oral, Q6H PRN    morphine, 2 mg, Intravenous, Q4H PRN    ondansetron, 8 mg, Oral, Q8H PRN    oxyCODONE, 5 mg, Oral, Q4H PRN    polyethylene glycol, 17 g, Oral, BID PRN    sodium chloride 0.9%, 10 mL, Intravenous, PRN    zolpidem, 5 mg, Oral, Nightly PRN    Review of patient's allergies indicates:   Allergen Reactions    Dobutamine Other (See Comments)     Severe Left sided chest pain after dosage administered for Dobutamine Stress Test; itching    Lisinopril Swelling and Rash    Hydrocodone-acetaminophen Nausea Only    Augmentin [amoxicillin-pot clavulanate] Diarrhea    Zyvox [linezolid] Nausea And Vomiting     Objective:     Vital Signs (Most Recent):  Temp: 98.5 °F (36.9 °C) (05/04/25 0730)  Pulse: 96 (05/04/25 0730)  Resp: 18 (05/04/25 0755)  BP: 136/67 (05/04/25 0730)  SpO2: 95 % (05/04/25 0730) Vital Signs (24h Range):  Temp:  [97 °F (36.1 °C)-99 °F (37.2 °C)] 98.5 °F (36.9 °C)  Pulse:  [] 96  Resp:  [18-19] 18  SpO2:  [91 %-97 %] 95 %  BP:  "()/(55-67) 136/67     Patient Vitals for the past 72 hrs (Last 3 readings):   Weight   05/04/25 0516 58.2 kg (128 lb 4.9 oz)   05/01/25 0900 66.2 kg (146 lb)     Body mass index is 23.47 kg/m².      Intake/Output Summary (Last 24 hours) at 5/4/2025 0751  Last data filed at 5/4/2025 0747  Gross per 24 hour   Intake 1520 ml   Output 700 ml   Net 820 ml       Hemodynamic Parameters:       Telemetry: NSR       Physical Exam  Constitutional:       Appearance: Normal appearance.   HENT:      Head: Normocephalic.      Mouth/Throat:      Mouth: Mucous membranes are moist.   Eyes:      Pupils: Pupils are equal, round, and reactive to light.   Cardiovascular:      Rate and Rhythm: Normal rate and regular rhythm.      Heart sounds: No murmur heard.  Pulmonary:      Effort: Pulmonary effort is normal.   Chest:      Chest wall: No tenderness.   Abdominal:      General: Abdomen is flat.      Palpations: Abdomen is soft.   Musculoskeletal:      Right lower leg: No edema.      Left lower leg: No edema.   Neurological:      Mental Status: She is alert.            Significant Labs:  CBC:  Recent Labs   Lab 05/01/25 0359 05/02/25 0326 05/03/25  0648   WBC 3.51* 3.75* 4.38   RBC 3.20* 3.10* 2.96*   HGB 9.2* 9.0* 8.7*   HCT 28.2* 28.3* 27.7*    175 163   MCV 88 91 94   MCH 28.8 29.0 29.4   MCHC 32.6 31.8* 31.4*     BNP:  No results for input(s): "BNP" in the last 168 hours.    Invalid input(s): "BNPTRIAGELBLO"  CMP:  Recent Labs   Lab 05/01/25  0359 05/02/25  0326 05/03/25  0648    117* 105   CALCIUM 8.5* 8.1* 7.7*   ALBUMIN 3.1* 2.9* 2.7*   PROT 7.7 7.3 6.7    143 142   K 4.1 3.9 4.4   CO2 20* 21* 23    111* 110   BUN 55* 52* 55*   CREATININE 3.4* 3.8* 4.3*   ALKPHOS 127 120 103   * 111* 75*   AST 99* 66* 46*   BILITOT 0.5 0.6 0.5      Coagulation:   No results for input(s): "PT", "INR", "APTT" in the last 168 hours.    LDH:  No results for input(s): "LDH" in the last 72 " hours.  Microbiology:  Microbiology Results (last 7 days)       Procedure Component Value Units Date/Time    Blood culture [[id number]]  (Normal) Collected: 04/27/25 0923    Order Status: Completed Specimen: Blood from Peripheral, Antecubital, Right Updated: 05/02/25 1101     Blood Culture No Growth After 5 Days    Blood culture [[id number]]  (Normal) Collected: 04/27/25 0942    Order Status: Completed Specimen: Blood from Peripheral, Antecubital, Left Updated: 05/02/25 1101     Blood Culture No Growth After 5 Days    Urine culture [[id number]]  (Abnormal)  (Susceptibility) Collected: 04/27/25 0845    Order Status: Completed Specimen: Urine, Catheterized Updated: 05/01/25 0222     Urine Culture >100,000 cfu/ml Enterococcus faecalis    Blood culture [[id number]]  (Normal) Collected: 04/25/25 0319    Order Status: Completed Specimen: Blood Updated: 04/30/25 1802     Blood Culture No Growth After 5 Days    Blood culture [[id number]]  (Normal) Collected: 04/25/25 0324    Order Status: Completed Specimen: Blood Updated: 04/30/25 1802     Blood Culture No Growth After 5 Days            I have reviewed all pertinent labs within the past 24 hours.    Estimated Creatinine Clearance: 9.6 mL/min (A) (based on SCr of 4.3 mg/dL (H)).    Diagnostic Results:  I have reviewed and interpreted all pertinent imaging results/findings within the past 24 hours.

## 2025-05-04 NOTE — CHAPLAIN
Patient: Nadia Damon  MRN: [id number]  : 1955  Age: 69 y.o.  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 Patient has son who is POA, a living will, and openly discussed her DNR status. Oatient is devour Evangelical, and believes 'God has pulled me through.' She has robust awareness of her prognosis and is at peace. Patient is 'grateful for life, for nature,' and stated 'I don't take anything for granted any more.' She will move to a nursing home near [place] upon discharge.     did not assess any salient spiritual/emotional needs, as patient seems to be coping well.  provided bible, daily bread and prayer per her request.         Rev. Idris Rosen, [id number]  board certified , dewey (Greek+)     support is available and on-site . Please call the on-call  for any emergent spiritual care needs, [phone].

## 2025-05-04 NOTE — CHAPLAIN
Patient: Nadia Damon  MRN: [id number]  : 1955  Age: 69 y.o.  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           Rev. Idris Rosen, [id number]  board certified , dewey (Indonesian+)     support is available and on-site . Please call the on-call  for any emergent spiritual care needs, [phone].

## 2025-05-04 NOTE — ASSESSMENT & PLAN NOTE
Patient complaining of dysuria. UA positive for urinary infection with high WBC and bacteriuria. Urine cultures positive for E. Faecalis.    Plan:   - Started ampicillin 2 gr daily on 5/1  We will switch to po  - zyrtec for itching and mucinex for mucus (Xray chest unchanged)

## 2025-05-04 NOTE — PROGRESS NOTES
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 No fever, burningn urine. Patient doing well. NAEON. Creatinine increasing, likely in the setting of ATN after cardiac arrest as well she has CKD V. Waiting for insurance authorization for nursing facility placement.     Continuous Infusions:  Scheduled Meds:   aspirin  81 mg Oral Daily    calcitRIOL  0.25 mcg Oral Daily    carvediloL  3.125 mg Oral BID WM    cetirizine  5 mg Oral Once    cycloSPORINE  50 mg Oral BID    dextromethorphan-guaiFENesin  mg  1 tablet Oral BID    enoxparin  30 mg Subcutaneous Q24H (prophylaxis, 1700)    NIFEdipine  60 mg Oral Daily    pantoprazole  40 mg Oral Daily    potassium bicarbonate  30 mEq Per OG tube Once    pregabalin  50 mg Oral QHS    sertraline  25 mg Oral Daily    sucralfate  1 g Oral Q6H    tiZANidine  4 mg Oral BID     PRN Meds:  Current Facility-Administered Medications:     acetaminophen, 650 mg, Oral, Q4H PRN    aluminum-magnesium hydroxide-simethicone, 30 mL, Oral, Q6H PRN    morphine, 2 mg, Intravenous, Q4H PRN    ondansetron, 8 mg, Oral, Q8H PRN    oxyCODONE, 5 mg, Oral, Q4H PRN    polyethylene glycol, 17 g, Oral, BID PRN    sodium chloride 0.9%, 10 mL, Intravenous, PRN    zolpidem, 5 mg, Oral, Nightly PRN    Review of patient's allergies indicates:   Allergen Reactions    Dobutamine Other (See Comments)     Severe Left sided chest pain after dosage administered for Dobutamine Stress Test; itching    Lisinopril Swelling and Rash    Hydrocodone-acetaminophen Nausea Only    Augmentin [amoxicillin-pot clavulanate] Diarrhea    Zyvox [linezolid]  "Nausea And Vomiting     Objective:     Vital Signs (Most Recent):  Temp: 98.5 °F (36.9 °C) (05/04/25 0730)  Pulse: 96 (05/04/25 0730)  Resp: 18 (05/04/25 0755)  BP: 136/67 (05/04/25 0730)  SpO2: 95 % (05/04/25 0730) Vital Signs (24h Range):  Temp:  [97 °F (36.1 °C)-99 °F (37.2 °C)] 98.5 °F (36.9 °C)  Pulse:  [] 96  Resp:  [18-19] 18  SpO2:  [91 %-97 %] 95 %  BP: ()/(55-67) 136/67     Patient Vitals for the past 72 hrs (Last 3 readings):   Weight   05/04/25 0516 58.2 kg (128 lb 4.9 oz)   05/01/25 0900 66.2 kg (146 lb)     Body mass index is 23.47 kg/m².      Intake/Output Summary (Last 24 hours) at 5/4/2025 0759  Last data filed at 5/4/2025 0747  Gross per 24 hour   Intake 1520 ml   Output 700 ml   Net 820 ml       Hemodynamic Parameters:       Telemetry: NSR       Physical Exam  Constitutional:       Appearance: Normal appearance.   HENT:      Head: Normocephalic.      Mouth/Throat:      Mouth: Mucous membranes are moist.   Eyes:      Pupils: Pupils are equal, round, and reactive to light.   Cardiovascular:      Rate and Rhythm: Normal rate and regular rhythm.      Heart sounds: No murmur heard.  Pulmonary:      Effort: Pulmonary effort is normal.   Chest:      Chest wall: No tenderness.   Abdominal:      General: Abdomen is flat.      Palpations: Abdomen is soft.   Musculoskeletal:      Right lower leg: No edema.      Left lower leg: No edema.   Neurological:      Mental Status: She is alert.            Significant Labs:  CBC:  Recent Labs   Lab 05/01/25  0359 05/02/25  0326 05/03/25  0648   WBC 3.51* 3.75* 4.38   RBC 3.20* 3.10* 2.96*   HGB 9.2* 9.0* 8.7*   HCT 28.2* 28.3* 27.7*    175 163   MCV 88 91 94   MCH 28.8 29.0 29.4   MCHC 32.6 31.8* 31.4*     BNP:  No results for input(s): "BNP" in the last 168 hours.    Invalid input(s): "BNPTRIAGELBLO"  CMP:  Recent Labs   Lab 05/01/25  0359 05/02/25  0326 05/03/25  0648    117* 105   CALCIUM 8.5* 8.1* 7.7*   ALBUMIN 3.1* 2.9* 2.7*   PROT 7.7 " "7.3 6.7    143 142   K 4.1 3.9 4.4   CO2 20* 21* 23    111* 110   BUN 55* 52* 55*   CREATININE 3.4* 3.8* 4.3*   ALKPHOS 127 120 103   * 111* 75*   AST 99* 66* 46*   BILITOT 0.5 0.6 0.5      Coagulation:   No results for input(s): "PT", "INR", "APTT" in the last 168 hours.    LDH:  No results for input(s): "LDH" in the last 72 hours.  Microbiology:  Microbiology Results (last 7 days)       Procedure Component Value Units Date/Time    Blood culture [[id number]]  (Normal) Collected: 04/27/25 0923    Order Status: Completed Specimen: Blood from Peripheral, Antecubital, Right Updated: 05/02/25 1101     Blood Culture No Growth After 5 Days    Blood culture [[id number]]  (Normal) Collected: 04/27/25 0942    Order Status: Completed Specimen: Blood from Peripheral, Antecubital, Left Updated: 05/02/25 1101     Blood Culture No Growth After 5 Days    Urine culture [[id number]]  (Abnormal)  (Susceptibility) Collected: 04/27/25 0845    Order Status: Completed Specimen: Urine, Catheterized Updated: 05/01/25 0222     Urine Culture >100,000 cfu/ml Enterococcus faecalis    Blood culture [[id number]]  (Normal) Collected: 04/25/25 0319    Order Status: Completed Specimen: Blood Updated: 04/30/25 1802     Blood Culture No Growth After 5 Days    Blood culture [[id number]]  (Normal) Collected: 04/25/25 0324    Order Status: Completed Specimen: Blood Updated: 04/30/25 1802     Blood Culture No Growth After 5 Days            I have reviewed all pertinent labs within the past 24 hours.    Estimated Creatinine Clearance: 9.6 mL/min (A) (based on SCr of 4.3 mg/dL (H)).    Diagnostic Results:  I have reviewed and interpreted all pertinent imaging results/findings within the past 24 hours.  Assessment and Plan:     Ms. Damon is a 68 y/o AAF s/p OHTx 5/27/93 at [facility], s/p PPM same date, mild anemia and leukopenia, OA, cervical spine DJD with radiculopathy and chronic neck pain who presents for her 31st post annual " transplant evaluation. Four years ago after her annual was found to have BRCA, underwent excision, chemo/radiation, had a rough time of it, with some complications related to it, however recovered well.      Transferred from [facility] ED due to chest pain. She initially was at the [facility] for pain management procedure for chronic back pain. In preprocedure area complained of nausea and chest pain having taken a sublingual nitro prior to arrival. Reported intermittent chest pain > 1 week. Took all regular medications on an empty stomach and per notes had n/v after starting IV. ECG with repol abnormalities. Noted to be hypokalemic and hypomagnesemic. Troponin I mildly elevated.     Off note, a few months ago was evaluated by  for a reducible ventral hernia containing portion of the liver no bowel noted on exam or imaging. At that time also reported chronic nausea. Elective hernia repair deemed not emergent and if she should require intervention to be done at center with cardiac surgery and Cardiology heart failure services.      Cardiac PET 11/15/2024    The myocardial perfusion images show no evidence of ischemia or scar.    The whole heart absolute myocardial perfusion values were elevated at rest, low normal during stress and CFR is mildly reduced in part due to elevated resting flow.    CT attenuation images demonstrate no coronary calcifications and mild diffuse aortic calcifications in the ascending aorta, in the descending aorta and moderate calcfications in the aortic arch.    The gated perfusion images showed an ejection fraction of 56% at rest and 59% during stress. A normal ejection fraction is greater than 47%.    There is normal wall motion at rest and normal wall motion during stress.    The study's ECG is negative for ischemia.       TTE 07/20/2024:    Left Ventricle: The left ventricle is normal in size. Normal wall thickness. There is normal systolic function with a visually estimated ejection fraction of  55 - 60%. There is normal diastolic function.    Right Ventricle: Normal right ventricular cavity size. Wall thickness is normal. Systolic function is normal.    Left Atrium: Patent foramen ovale visualized with predominant right to left shunting indicated by saline contrast.    Tricuspid Valve: There is mild regurgitation.    IVC/SVC: Normal venous pressure at 3 mmHg.    The patient is status post cardiac transplantation.  PFO not present with bubble alone, but with valsalva had very small bubbles come across         Cxr: Comparison is made to January 4, 2023.  Electrical lead overlies the lower chest and upper abdomen.  Surgical clips overlie the upper abdomen.  Mediastinal silhouette is prominent.  The lungs are clear.  No pneumothorax or significant pleural effusion     DSE 05/24/21:  The left ventricle is normal in size with concentric remodeling and normal systolic function.  The patient reached the end of the protocol.  There were no arrhythmias during stress.  Severe left atrial enlargement.  The estimated ejection fraction is 55%.  Grade II left ventricular diastolic dysfunction.  Normal right ventricular size with normal right ventricular systolic function.  Mild-to-moderate mitral regurgitation.  Mild to moderate tricuspid regurgitation.  Normal central venous pressure (3 mmHg).  The estimated PA systolic pressure is 39 mmHg.  The stress echo portion of this study is negative for myocardial ischemia.  Severe left atrial enlargement.  The ECG portion of this study is negative for myocardial ischemia.    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   -Last PET stress 11/2024  negative for ischemia  - Both TTE and AYAAN done.   -2D echo 4/28/25 EF: 45%, global hypokinesis, diastolic dysfunction.  RV function moderately reduced.  Mild to Mod TR. LVEDD: 4cm  - not  currently candidate for angiogram given renal dysfunction  -ASA    Hypermagnesemia  Stop Maalox  Will monitor    GRACE (acute kidney injury)  Patient with history of CKD, now GRACE on CKD due to ATN after cardiac arrest.   Baseline creatinine around 2.6. Last creatinine 3.8    Plan:   - Will give fluids, 500 ml   - Avoid nephrotoxins   - Daily BMP     CKD (chronic kidney disease), stage IV  -Baseline creatinine ~3   - GRACE on CKD likely the setting of ATN after cardiac arrest as well as CKD V (per Nephrology its likely due to chronic calcineurin exposure and working for creating AV fistula)      Cardiac arrest  -Unclear etiology but suspect possible CAV as cause   -Patient now starting to wake up and follow commands, CTH w/ no acute findings  -Was intubated for airway protection, now extubated.   -Pulm consulted.  Corticosteroids given overnight and patient extubated 4/29  -patient made DNR on 4/30    Hypomagnesemia  -replacing Mag and monitor renal function    Hypokalemia  -replacing electrolytes as needed while monitoring renal function    Anemia  Stable, no acute signs of bleeding    Chronic right-sided thoracic back pain  -continue home medications    Chronic idiopathic constipation  - prn laxatives    Abnormal serum thyroid stimulating hormone (TSH) level  -repeating thyroid panel    Acute cystitis without hematuria  Patient complaining of dysuria. UA positive for urinary infection with high WBC and bacteriuria. Urine cultures positive for E. Faecalis.    Plan:   - Started ampicillin 2 gr daily on 5/1  We will switch to po  - zyrtec for itching and mucinex for mucus (Xray chest unchanged)    Chest pain  -Multifactorial now possibly secondary to cardiac arrest     Anemia associated with stage 5 chronic renal failure  -H/H stable  -No signs of bleeding    Immunocompromised state due to drug therapy  S/p heart transplant  On Cyclosporin    Essential hypertension  -Will monitor on current regimen     S/P orthotopic  heart transplant  -Transplant Date 1993 At [facility]   -CMV Status:D?R+   -Rejection status: Moderate Risk  -Last HLA/DSA 7/2/24 week DQ7  -2D echo 4/28/25 EF: 45%, global hypokinesis, diastolic dysfunction.  RV function moderately reduced.  Mild to Mod TR. LVEDD: 4cm  -Immunosuppression: cyclosporine with goal         Yohannes Causey MD  Heart Transplant  [facility] - Transplant Stepdown

## 2025-05-05 LAB
ABSOLUTE EOSINOPHIL (OHS): 0.12 K/UL
ABSOLUTE MONOCYTE (OHS): 0.51 K/UL (ref 0.3–1)
ABSOLUTE NEUTROPHIL COUNT (OHS): 2.14 K/UL (ref 1.8–7.7)
ALBUMIN SERPL BCP-MCNC: 2.8 G/DL (ref 3.5–5.2)
ALP SERPL-CCNC: 140 UNIT/L (ref 40–150)
ALT SERPL W/O P-5'-P-CCNC: 49 UNIT/L (ref 10–44)
ANION GAP (OHS): 9 MMOL/L (ref 8–16)
AST SERPL-CCNC: 30 UNIT/L (ref 11–45)
BASOPHILS # BLD AUTO: 0.01 K/UL
BASOPHILS NFR BLD AUTO: 0.3 %
BILIRUB SERPL-MCNC: 0.5 MG/DL (ref 0.1–1)
BUN SERPL-MCNC: 54 MG/DL (ref 8–23)
CALCIUM SERPL-MCNC: 7.9 MG/DL (ref 8.7–10.5)
CHLORIDE SERPL-SCNC: 104 MMOL/L (ref 95–110)
CO2 SERPL-SCNC: 22 MMOL/L (ref 23–29)
CREAT SERPL-MCNC: 3.3 MG/DL (ref 0.5–1.4)
CYCLOSPORINE BLD LC/MS/MS-MCNC: 74 NG/ML (ref 100–400)
ERYTHROCYTE [DISTWIDTH] IN BLOOD BY AUTOMATED COUNT: 15.9 % (ref 11.5–14.5)
GFR SERPLBLD CREATININE-BSD FMLA CKD-EPI: 14 ML/MIN/1.73/M2
GLUCOSE SERPL-MCNC: 92 MG/DL (ref 70–110)
HCT VFR BLD AUTO: 27 % (ref 37–48.5)
HGB BLD-MCNC: 8.7 GM/DL (ref 12–16)
IMM GRANULOCYTES # BLD AUTO: 0.06 K/UL (ref 0–0.04)
IMM GRANULOCYTES NFR BLD AUTO: 1.5 % (ref 0–0.5)
LYMPHOCYTES # BLD AUTO: 1.06 K/UL (ref 1–4.8)
MAGNESIUM SERPL-MCNC: 3 MG/DL (ref 1.6–2.6)
MCH RBC QN AUTO: 28.7 PG (ref 27–31)
MCHC RBC AUTO-ENTMCNC: 32.2 G/DL (ref 32–36)
MCV RBC AUTO: 89 FL (ref 82–98)
NSE SERPL-MCNC: 11 NG/ML
NUCLEATED RBC (/100WBC) (OHS): 0 /100 WBC
PHOSPHATE SERPL-MCNC: 2.9 MG/DL (ref 2.7–4.5)
PLATELET # BLD AUTO: 206 K/UL (ref 150–450)
PMV BLD AUTO: 9.7 FL (ref 9.2–12.9)
POTASSIUM SERPL-SCNC: 4.2 MMOL/L (ref 3.5–5.1)
PROT SERPL-MCNC: 7.2 GM/DL (ref 6–8.4)
RBC # BLD AUTO: 3.03 M/UL (ref 4–5.4)
RELATIVE EOSINOPHIL (OHS): 3.1 %
RELATIVE LYMPHOCYTE (OHS): 27.2 % (ref 18–48)
RELATIVE MONOCYTE (OHS): 13.1 % (ref 4–15)
RELATIVE NEUTROPHIL (OHS): 54.8 % (ref 38–73)
SODIUM SERPL-SCNC: 135 MMOL/L (ref 136–145)
WBC # BLD AUTO: 3.9 K/UL (ref 3.9–12.7)

## 2025-05-05 PROCEDURE — 85025 COMPLETE CBC W/AUTO DIFF WBC: CPT | Mod: HCNC

## 2025-05-05 PROCEDURE — 80158 DRUG ASSAY CYCLOSPORINE: CPT | Mod: HCNC | Performed by: INTERNAL MEDICINE

## 2025-05-05 PROCEDURE — 99233 SBSQ HOSP IP/OBS HIGH 50: CPT | Mod: HCNC,,, | Performed by: INTERNAL MEDICINE

## 2025-05-05 PROCEDURE — 25000003 PHARM REV CODE 250: Mod: HCNC | Performed by: STUDENT IN AN ORGANIZED HEALTH CARE EDUCATION/TRAINING PROGRAM

## 2025-05-05 PROCEDURE — 97530 THERAPEUTIC ACTIVITIES: CPT | Mod: HCNC

## 2025-05-05 PROCEDURE — 97535 SELF CARE MNGMENT TRAINING: CPT | Mod: HCNC

## 2025-05-05 PROCEDURE — 82435 ASSAY OF BLOOD CHLORIDE: CPT | Mod: HCNC

## 2025-05-05 PROCEDURE — 25000003 PHARM REV CODE 250: Mod: HCNC | Performed by: INTERNAL MEDICINE

## 2025-05-05 PROCEDURE — 84100 ASSAY OF PHOSPHORUS: CPT | Mod: HCNC

## 2025-05-05 PROCEDURE — 25000003 PHARM REV CODE 250: Mod: HCNC

## 2025-05-05 PROCEDURE — 25000242 PHARM REV CODE 250 ALT 637 W/ HCPCS: Mod: HCNC

## 2025-05-05 PROCEDURE — 63600175 PHARM REV CODE 636 W HCPCS: Mod: HCNC | Performed by: PHYSICIAN ASSISTANT

## 2025-05-05 PROCEDURE — 25000003 PHARM REV CODE 250: Mod: HCNC | Performed by: PHYSICIAN ASSISTANT

## 2025-05-05 PROCEDURE — 83735 ASSAY OF MAGNESIUM: CPT | Mod: HCNC

## 2025-05-05 PROCEDURE — 36415 COLL VENOUS BLD VENIPUNCTURE: CPT | Mod: HCNC

## 2025-05-05 PROCEDURE — 20600001 HC STEP DOWN PRIVATE ROOM: Mod: HCNC

## 2025-05-05 RX ORDER — SIMETHICONE 80 MG
1 TABLET,CHEWABLE ORAL 3 TIMES DAILY PRN
Status: DISCONTINUED | OUTPATIENT
Start: 2025-05-05 | End: 2025-05-06 | Stop reason: HOSPADM

## 2025-05-05 RX ADMIN — ASPIRIN 81 MG CHEWABLE TABLET 81 MG: 81 TABLET CHEWABLE at 09:05

## 2025-05-05 RX ADMIN — SIMETHICONE 80 MG: 80 TABLET, CHEWABLE ORAL at 03:05

## 2025-05-05 RX ADMIN — ENOXAPARIN SODIUM 30 MG: 30 INJECTION SUBCUTANEOUS at 05:05

## 2025-05-05 RX ADMIN — GUAIFENESIN, DEXTROMETHORPHAN HBR 1 TABLET: 600; 30 TABLET ORAL at 09:05

## 2025-05-05 RX ADMIN — CYCLOSPORINE 50 MG: 100 SOLUTION ORAL at 05:05

## 2025-05-05 RX ADMIN — NIFEDIPINE 60 MG: 30 TABLET, FILM COATED, EXTENDED RELEASE ORAL at 09:05

## 2025-05-05 RX ADMIN — CALCITRIOL CAPSULES 0.25 MCG 0.25 MCG: 0.25 CAPSULE ORAL at 09:05

## 2025-05-05 RX ADMIN — GUAIFENESIN, DEXTROMETHORPHAN HBR 1 TABLET: 600; 30 TABLET ORAL at 08:05

## 2025-05-05 RX ADMIN — PANTOPRAZOLE SODIUM 40 MG: 40 TABLET, DELAYED RELEASE ORAL at 09:05

## 2025-05-05 RX ADMIN — CARVEDILOL 3.12 MG: 3.12 TABLET, FILM COATED ORAL at 09:05

## 2025-05-05 RX ADMIN — PREGABALIN 50 MG: 50 CAPSULE ORAL at 08:05

## 2025-05-05 RX ADMIN — ZOLPIDEM TARTRATE 5 MG: 5 TABLET, FILM COATED ORAL at 10:05

## 2025-05-05 RX ADMIN — ACETAMINOPHEN 650 MG: 325 TABLET ORAL at 08:05

## 2025-05-05 RX ADMIN — CARVEDILOL 3.12 MG: 3.12 TABLET, FILM COATED ORAL at 05:05

## 2025-05-05 RX ADMIN — PSYLLIUM HUSK 1 PACKET: 3.4 POWDER ORAL at 09:05

## 2025-05-05 RX ADMIN — SERTRALINE HYDROCHLORIDE 25 MG: 25 TABLET ORAL at 09:05

## 2025-05-05 RX ADMIN — TIZANIDINE 4 MG: 4 TABLET ORAL at 08:05

## 2025-05-05 RX ADMIN — SIMETHICONE 80 MG: 80 TABLET, CHEWABLE ORAL at 08:05

## 2025-05-05 RX ADMIN — ALUMINUM HYDROXIDE, MAGNESIUM HYDROXIDE, AND SIMETHICONE 30 ML: 200; 200; 20 SUSPENSION ORAL at 03:05

## 2025-05-05 RX ADMIN — TIZANIDINE 4 MG: 4 TABLET ORAL at 09:05

## 2025-05-05 RX ADMIN — CYCLOSPORINE 50 MG: 100 SOLUTION ORAL at 09:05

## 2025-05-05 RX ADMIN — OXYCODONE 5 MG: 5 TABLET ORAL at 03:05

## 2025-05-05 NOTE — ASSESSMENT & PLAN NOTE
-Multifactorial now possibly secondary to cardiac arrest   -suspect possible component of CAV as well, unable to perform angio gram given renal function for confirmation

## 2025-05-05 NOTE — ASSESSMENT & PLAN NOTE
-ECG repeated bifascicular block noted, old ST depression high lateral leads, T wave inversion anterolateral leads.   -Last PET stress 11/2024  negative for ischemia  - Both TTE and AYAAN done.   -2D echo 4/28/25 EF: 45%, global hypokinesis, diastolic dysfunction.  RV function moderately reduced.  Mild to Mod TR. LVEDD: 4cm  - not currently candidate for angiogram given renal dysfunction  -ASA  -neg PET stress in 11/2024, given this and troponin only mildly elevated, no need for LHC for eval

## 2025-05-05 NOTE — PLAN OF CARE
Plan of care reviewed on am rounds, afrebrile, vss tele sr  wbc 3  h/h stable Cr trended [place] 3.3, voiding without difficulty, gen c.o abdominal gas discomfort, new order for prn Simethacone obtained, (+) bm today, appetite fair.Up ambulatory withwalker ( standby assist ) fall precautions maintained.PT/OT in progress. Awaiting dc to SNF.Cooperative with care.DNR order in effect.Emotional support provided throughout shift.

## 2025-05-05 NOTE — PLAN OF CARE
[facility]   Heart Transplant Clinic  [address] [place] [address]   [phone] after hours        HOME  HEALTH ORDERS      Admit to Home Health    Diagnosis: Problem List[1]    Patient is homebound due to:   Diet: Low Sodium  Acitivities: As Tolerated    Nursing:   SN to complete comprehensive assessment including routine vital signs. Instruct on disease process and s/s of complications to report to MD. Review/verify medication list sent home with the patient at time of discharge  and instruct patient/caregiver as needed. Frequency may be adjusted depending on start of care date.    Notify MD if SBP > 160 or < 90; DBP > 90 or < 50; HR > 120 or < 50; Temp > 101; Weight gain >3lbs in 1 day or 5lbs in 1 week.    LABS:  SN to perform labs: Weekly bmp, mag, cbc.       CONSULTS:      Physical Therapy she will be referred to center near her home for PT.  PT is not to be performed via HH.  Occupational Therapy to evaluate and treat. Evaluate home environment for safety and equipment needs. Perform/Instruct on transfers, ADL training, ROM, and therapeutic exercises.  Speech Therapy  to evaluate and treat for:  Language  Swallowing  Cognition              **Transition to outpatient PT/OT once/if appropriate**                                  to evaluate for community resources/long-range planning.   Aide to provide assistance with personal care, ADLs, and vital signs    Send initial Home Health orders to HTS attending physician on call.  Send follow up questions to [phone] or fax:                     Pre Transplant:   [phone]        Post Transplant: [phone]        Heart Failure:      [phone]             [1]   Patient Active Problem List  Diagnosis    S/P orthotopic heart transplant    Primary insomnia    Fibromyalgia    Gastroesophageal reflux disease without esophagitis    Essential hypertension    Aortic atherosclerosis     Secondary hyperparathyroidism, renal    Other osteoporosis without current pathological fracture    Immunocompromised patient    Ductal carcinoma in situ (DCIS) of left breast    Immunocompromised state due to drug therapy    Ulnar neuropathy of left upper extremity    Chronic diastolic (congestive) heart failure    Anemia associated with stage 5 chronic renal failure    Secondary and unspecified malignant neoplasm of axilla and upper limb lymph nodes    Chest pain    Acute cystitis without hematuria    Borderline abnormal TFTs    Other hyperlipidemia    DDD (degenerative disc disease), thoracolumbar    Ventral hernia without obstruction or gangrene    Abnormal serum thyroid stimulating hormone (TSH) level    FRANKY (stress urinary incontinence, female)    Prolapse of vaginal cuff after hysterectomy    Pulmonary heart disease    Urge incontinence of urine    Orthopedic device, implant, or graft complication    Bilateral hip pain    Chronic idiopathic constipation    Spinal stenosis of lumbosacral region    Patent foramen ovale    Chronic diastolic congestive heart failure    Primary osteoarthritis of right shoulder    Prolonged Q-T interval on ECG    CKD (chronic kidney disease) stage 5, GFR less than 15 ml/min    Chronic right-sided thoracic back pain    Hemiparesis affecting right side as late effect of cerebrovascular accident (CVA)    Encounter for palliative care    Angina of effort    Rapid palpitations    Spinal stenosis    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

## 2025-05-05 NOTE — PROGRESS NOTES
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 Pt reports that last night, she felt her pain was different from her rib pains from chest compressions. No EKG ovn for review/ reports nursing gave her some pain medication. Discussed possibility this is ongoing 2/2 CAV/unable to evaluate with angiogram given renal function.     Pt also having cramping/difficulty with BM. Added on fiber supplementation. Reports she takes fiber and prunes/prune juice when constipated at home.    Pt working with nephro o/p to get dialysis set up. Discussed goals of care, she would like escalation of care to ICU level care until she can have further discussions with son if she were to get sicker and make decisions from there. Confirms DNR.     Waiting for insurance authorization for nursing facility placement.     Continuous Infusions:  Scheduled Meds:   aspirin  81 mg Oral Daily    calcitRIOL  0.25 mcg Oral Daily    carvediloL  3.125 mg Oral BID WM    cycloSPORINE  50 mg Oral BID    dextromethorphan-guaiFENesin  mg  1 tablet Oral BID    enoxparin  30 mg Subcutaneous Q24H (prophylaxis, 1700)    NIFEdipine  60 mg Oral Daily    pantoprazole  40 mg Oral Daily    pregabalin  50 mg Oral QHS    sertraline  25 mg Oral Daily    sucralfate  1 g Oral Q6H    tiZANidine  4 mg Oral BID     PRN Meds:  Current Facility-Administered Medications:     acetaminophen, 650 mg, Oral, Q4H PRN    aluminum-magnesium hydroxide-simethicone, 30 mL, Oral, Q6H PRN    morphine, 2 mg, Intravenous, Q4H PRN    ondansetron, 8 mg, Oral, Q8H PRN    oxyCODONE,  5 mg, Oral, Q4H PRN    polyethylene glycol, 17 g, Oral, BID PRN    sodium chloride 0.9%, 10 mL, Intravenous, PRN    zolpidem, 5 mg, Oral, Nightly PRN    Review of patient's allergies indicates:   Allergen Reactions    Dobutamine Other (See Comments)     Severe Left sided chest pain after dosage administered for Dobutamine Stress Test; itching    Lisinopril Swelling and Rash    Hydrocodone-acetaminophen Nausea Only    Augmentin [amoxicillin-pot clavulanate] Diarrhea    Zyvox [linezolid] Nausea And Vomiting     Objective:     Vital Signs (Most Recent):  Temp: 98.5 °F (36.9 °C) (05/05/25 0756)  Pulse: 85 (05/05/25 0802)  Resp: 18 (05/05/25 0756)  BP: 114/69 (05/05/25 0756)  SpO2: (!) 93 % (05/05/25 0756) Vital Signs (24h Range):  Temp:  [96.8 °F (36 °C)-99 °F (37.2 °C)] 98.5 °F (36.9 °C)  Pulse:  [] 85  Resp:  [17-20] 18  SpO2:  [93 %-99 %] 93 %  BP: ()/(56-69) 114/69     Patient Vitals for the past 72 hrs (Last 3 readings):   Weight   05/05/25 0448 59.5 kg (131 lb 2.8 oz)   05/04/25 0516 58.2 kg (128 lb 4.9 oz)     Body mass index is 23.99 kg/m².      Intake/Output Summary (Last 24 hours) at 5/5/2025 0907  Last data filed at 5/5/2025 0852  Gross per 24 hour   Intake 1590.4 ml   Output 1300 ml   Net 290.4 ml       Hemodynamic Parameters:       Telemetry: NSR       Physical Exam  Constitutional:       Appearance: Normal appearance.   HENT:      Head: Normocephalic.      Mouth/Throat:      Mouth: Mucous membranes are moist.   Eyes:      Pupils: Pupils are equal, round, and reactive to light.   Cardiovascular:      Rate and Rhythm: Normal rate and regular rhythm.      Heart sounds: No murmur heard.  Pulmonary:      Effort: Pulmonary effort is normal.   Chest:      Chest wall: No tenderness.   Abdominal:      General: Abdomen is flat.      Palpations: Abdomen is soft.   Musculoskeletal:      Right lower leg: No edema.      Left lower leg: No edema.   Neurological:      Mental Status: She is alert.         "    Significant Labs:  CBC:  Recent Labs   Lab 05/03/25  0648 05/04/25  1018 05/05/25  0652   WBC 4.38 3.58* 3.90   RBC 2.96* 2.73* 3.03*   HGB 8.7* 7.9* 8.7*   HCT 27.7* 24.4* 27.0*    155 206   MCV 94 89 89   MCH 29.4 28.9 28.7   MCHC 31.4* 32.4 32.2     BNP:  No results for input(s): "BNP" in the last 168 hours.    Invalid input(s): "BNPTRIAGELBLO"  CMP:  Recent Labs   Lab 05/03/25  0648 05/04/25  1018 05/05/25  0652    94 92   CALCIUM 7.7* 7.8* 7.9*   ALBUMIN 2.7* 2.7* 2.8*   PROT 6.7 6.7 7.2    136 135*   K 4.4 3.8 4.2   CO2 23 20* 22*    105 104   BUN 55* 54* 54*   CREATININE 4.3* 3.7* 3.3*   ALKPHOS 103 105 140   ALT 75* 54* 49*   AST 46* 32 30   BILITOT 0.5 0.6 0.5      Coagulation:   No results for input(s): "PT", "INR", "APTT" in the last 168 hours.    LDH:  No results for input(s): "LDH" in the last 72 hours.  Microbiology:  Microbiology Results (last 7 days)       Procedure Component Value Units Date/Time    Blood culture [[id number]]  (Normal) Collected: 04/27/25 0923    Order Status: Completed Specimen: Blood from Peripheral, Antecubital, Right Updated: 05/02/25 1101     Blood Culture No Growth After 5 Days    Blood culture [[id number]]  (Normal) Collected: 04/27/25 0942    Order Status: Completed Specimen: Blood from Peripheral, Antecubital, Left Updated: 05/02/25 1101     Blood Culture No Growth After 5 Days    Urine culture [[id number]]  (Abnormal)  (Susceptibility) Collected: 04/27/25 0845    Order Status: Completed Specimen: Urine, Catheterized Updated: 05/01/25 0222     Urine Culture >100,000 cfu/ml Enterococcus faecalis    Blood culture [[id number]]  (Normal) Collected: 04/25/25 0319    Order Status: Completed Specimen: Blood Updated: 04/30/25 1802     Blood Culture No Growth After 5 Days    Blood culture [[id number]]  (Normal) Collected: 04/25/25 0324    Order Status: Completed Specimen: Blood Updated: 04/30/25 1802     Blood Culture No Growth After 5 Days            I " have reviewed all pertinent labs within the past 24 hours.    Estimated Creatinine Clearance: 12.5 mL/min (A) (based on SCr of 3.3 mg/dL (H)).    Diagnostic Results:  I have reviewed and interpreted all pertinent imaging results/findings within the past 24 hours.  Assessment and Plan:     Ms. Damon is a 68 y/o AAF s/p OHTx 5/27/93 at [facility], s/p PPM same date, mild anemia and leukopenia, OA, cervical spine DJD with radiculopathy and chronic neck pain who presents for her 31st post annual transplant evaluation. Four years ago after her annual was found to have BRCA, underwent excision, chemo/radiation, had a rough time of it, with some complications related to it, however recovered well.      Transferred from [facility] ED due to chest pain. She initially was at the [facility] for pain management procedure for chronic back pain. In preprocedure area complained of nausea and chest pain having taken a sublingual nitro prior to arrival. Reported intermittent chest pain > 1 week. Took all regular medications on an empty stomach and per notes had n/v after starting IV. ECG with repol abnormalities. Noted to be hypokalemic and hypomagnesemic. Troponin I mildly elevated.     Off note, a few months ago was evaluated by [facility] for a reducible ventral hernia containing portion of the liver no bowel noted on exam or imaging. At that time also reported chronic nausea. Elective hernia repair deemed not emergent and if she should require intervention to be done at center with cardiac surgery and Cardiology heart failure services.      Cardiac PET 11/15/2024    The myocardial perfusion images show no evidence of ischemia or scar.    The whole heart absolute myocardial perfusion values were elevated at rest, low normal during stress and CFR is mildly reduced in part due to elevated resting flow.    CT attenuation images demonstrate no coronary calcifications and mild diffuse aortic calcifications in the ascending aorta, in the  descending aorta and moderate calcfications in the aortic arch.    The gated perfusion images showed an ejection fraction of 56% at rest and 59% during stress. A normal ejection fraction is greater than 47%.    There is normal wall motion at rest and normal wall motion during stress.    The study's ECG is negative for ischemia.       TTE 07/20/2024:    Left Ventricle: The left ventricle is normal in size. Normal wall thickness. There is normal systolic function with a visually estimated ejection fraction of 55 - 60%. There is normal diastolic function.    Right Ventricle: Normal right ventricular cavity size. Wall thickness is normal. Systolic function is normal.    Left Atrium: Patent foramen ovale visualized with predominant right to left shunting indicated by saline contrast.    Tricuspid Valve: There is mild regurgitation.    IVC/SVC: Normal venous pressure at 3 mmHg.    The patient is status post cardiac transplantation.  PFO not present with bubble alone, but with valsalva had very small bubbles come across         Cxr: Comparison is made to January 4, 2023.  Electrical lead overlies the lower chest and upper abdomen.  Surgical clips overlie the upper abdomen.  Mediastinal silhouette is prominent.  The lungs are clear.  No pneumothorax or significant pleural effusion     DSE 05/24/21:  The left ventricle is normal in size with concentric remodeling and normal systolic function.  The patient reached the end of the protocol.  There were no arrhythmias during stress.  Severe left atrial enlargement.  The estimated ejection fraction is 55%.  Grade II left ventricular diastolic dysfunction.  Normal right ventricular size with normal right ventricular systolic function.  Mild-to-moderate mitral regurgitation.  Mild to moderate tricuspid regurgitation.  Normal central venous pressure (3 mmHg).  The estimated PA systolic pressure is 39 mmHg.  The stress echo portion of this study is negative for myocardial  ischemia.  Severe left atrial enlargement.  The ECG portion of this study is negative for myocardial ischemia.    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   -Last PET stress 11/2024  negative for ischemia  - Both TTE and AYAAN done.   -2D echo 4/28/25 EF: 45%, global hypokinesis, diastolic dysfunction.  RV function moderately reduced.  Mild to Mod TR. LVEDD: 4cm  - not currently candidate for angiogram given renal dysfunction  -ASA    Hypermagnesemia  Stop Maalox  Will monitor    GRACE (acute kidney injury)  Patient with history of CKD, now GRACE on CKD due to ATN after cardiac arrest.   Baseline creatinine around 2.6. Last creatinine 3.8    Plan:   - Will give fluids, 500 ml   - Avoid nephrotoxins   - Daily BMP     CKD (chronic kidney disease), stage IV  -Baseline creatinine ~3   - GRACE on CKD likely the setting of ATN after cardiac arrest as well as CKD V (per Nephrology its likely due to chronic calcineurin exposure and working for creating AV fistula)      Cardiac arrest  -Unclear etiology but suspect possible CAV as cause   -Patient now starting to wake up and follow commands, CTH w/ no acute findings  -Pulm consulted for assistance with exubation.  Corticosteroids given overnight and patient extubated 4/29  -patient made DNR on 4/30    Hypomagnesemia  -replacing Mag and monitor renal function    Hypokalemia  -replacing electrolytes as needed while monitoring renal function    Anemia  Stable, no acute signs of bleeding    Chronic right-sided thoracic back pain  -continue home medications    Chronic idiopathic constipation  - prn laxatives  -scheduled fiber supplementation     Abnormal serum thyroid stimulating hormone (TSH) level  -repeating thyroid panel    Acute cystitis without hematuria  Patient complaining of dysuria. UA positive for urinary infection with high  WBC and bacteriuria. Urine cultures positive for E. Faecalis.    Plan:   - Started ampicillin 2 gr daily on 5/1  We will switch to po  - zyrtec for itching and mucinex for mucus (Xray chest unchanged)    Chest pain  -Multifactorial now possibly secondary to cardiac arrest   -suspect possible component of CAV as well, unable to perform angio gram given renal function for confirmation     Anemia associated with stage 5 chronic renal failure  -H/H stable  -No signs of bleeding    Immunocompromised state due to drug therapy  S/p heart transplant  On Cyclosporin    Essential hypertension  -Will monitor on current regimen     S/P orthotopic heart transplant  -Transplant Date 1993 At [facility]   -CMV Status:D?R+   -Rejection status: Moderate Risk  -Last HLA/DSA 7/2/24 week DQ7  -2D echo 4/28/25 EF: 45%, global hypokinesis, diastolic dysfunction.  RV function moderately reduced.  Mild to Mod TR. LVEDD: 4cm  -Immunosuppression: cyclosporine with goal         Keri Dyson MD  Heart Transplant  [facility] - Transplant Stepdown

## 2025-05-05 NOTE — SUBJECTIVE & OBJECTIVE
Interval History: Reports cough with mucus as well as L sided chest pain and rib pain. Pt reports that last night, she felt her pain was different from her rib pains from chest compressions. No EKG ovn for review/ reports nursing gave her some pain medication. Discussed possibility this is ongoing 2/2 CAV/unable to evaluate with angiogram given renal function.     Pt also having cramping/difficulty with BM. Added on fiber supplementation. Reports she takes fiber and prunes/prune juice when constipated at home.    Pt working with nephro o/p to get dialysis set up. Discussed goals of care, she would like escalation of care to ICU level care until she can have further discussions with son if she were to get sicker and make decisions from there. Confirms DNR.     Waiting for insurance authorization for nursing facility placement.     Continuous Infusions:  Scheduled Meds:   aspirin  81 mg Oral Daily    calcitRIOL  0.25 mcg Oral Daily    carvediloL  3.125 mg Oral BID WM    cycloSPORINE  50 mg Oral BID    dextromethorphan-guaiFENesin  mg  1 tablet Oral BID    enoxparin  30 mg Subcutaneous Q24H (prophylaxis, 1700)    NIFEdipine  60 mg Oral Daily    pantoprazole  40 mg Oral Daily    pregabalin  50 mg Oral QHS    sertraline  25 mg Oral Daily    sucralfate  1 g Oral Q6H    tiZANidine  4 mg Oral BID     PRN Meds:  Current Facility-Administered Medications:     acetaminophen, 650 mg, Oral, Q4H PRN    aluminum-magnesium hydroxide-simethicone, 30 mL, Oral, Q6H PRN    morphine, 2 mg, Intravenous, Q4H PRN    ondansetron, 8 mg, Oral, Q8H PRN    oxyCODONE, 5 mg, Oral, Q4H PRN    polyethylene glycol, 17 g, Oral, BID PRN    sodium chloride 0.9%, 10 mL, Intravenous, PRN    zolpidem, 5 mg, Oral, Nightly PRN    Review of patient's allergies indicates:   Allergen Reactions    Dobutamine Other (See Comments)     Severe Left sided chest pain after dosage administered for Dobutamine Stress Test; itching    Lisinopril Swelling and Rash  "   Hydrocodone-acetaminophen Nausea Only    Augmentin [amoxicillin-pot clavulanate] Diarrhea    Zyvox [linezolid] Nausea And Vomiting     Objective:     Vital Signs (Most Recent):  Temp: 98.5 °F (36.9 °C) (05/05/25 0756)  Pulse: 85 (05/05/25 0802)  Resp: 18 (05/05/25 0756)  BP: 114/69 (05/05/25 0756)  SpO2: (!) 93 % (05/05/25 0756) Vital Signs (24h Range):  Temp:  [96.8 °F (36 °C)-99 °F (37.2 °C)] 98.5 °F (36.9 °C)  Pulse:  [] 85  Resp:  [17-20] 18  SpO2:  [93 %-99 %] 93 %  BP: ()/(56-69) 114/69     Patient Vitals for the past 72 hrs (Last 3 readings):   Weight   05/05/25 0448 59.5 kg (131 lb 2.8 oz)   05/04/25 0516 58.2 kg (128 lb 4.9 oz)     Body mass index is 23.99 kg/m².      Intake/Output Summary (Last 24 hours) at 5/5/2025 0907  Last data filed at 5/5/2025 0852  Gross per 24 hour   Intake 1590.4 ml   Output 1300 ml   Net 290.4 ml       Hemodynamic Parameters:       Telemetry: NSR       Physical Exam  Constitutional:       Appearance: Normal appearance.   HENT:      Head: Normocephalic.      Mouth/Throat:      Mouth: Mucous membranes are moist.   Eyes:      Pupils: Pupils are equal, round, and reactive to light.   Cardiovascular:      Rate and Rhythm: Normal rate and regular rhythm.      Heart sounds: No murmur heard.  Pulmonary:      Effort: Pulmonary effort is normal.   Chest:      Chest wall: No tenderness.   Abdominal:      General: Abdomen is flat.      Palpations: Abdomen is soft.   Musculoskeletal:      Right lower leg: No edema.      Left lower leg: No edema.   Neurological:      Mental Status: She is alert.            Significant Labs:  CBC:  Recent Labs   Lab 05/03/25  0648 05/04/25  1018 05/05/25  0652   WBC 4.38 3.58* 3.90   RBC 2.96* 2.73* 3.03*   HGB 8.7* 7.9* 8.7*   HCT 27.7* 24.4* 27.0*    155 206   MCV 94 89 89   MCH 29.4 28.9 28.7   MCHC 31.4* 32.4 32.2     BNP:  No results for input(s): "BNP" in the last 168 hours.    Invalid input(s): "BNPTRIAGELBLO"  CMP:  Recent Labs " "  Lab 05/03/25  0648 05/04/25  1018 05/05/25  0652    94 92   CALCIUM 7.7* 7.8* 7.9*   ALBUMIN 2.7* 2.7* 2.8*   PROT 6.7 6.7 7.2    136 135*   K 4.4 3.8 4.2   CO2 23 20* 22*    105 104   BUN 55* 54* 54*   CREATININE 4.3* 3.7* 3.3*   ALKPHOS 103 105 140   ALT 75* 54* 49*   AST 46* 32 30   BILITOT 0.5 0.6 0.5      Coagulation:   No results for input(s): "PT", "INR", "APTT" in the last 168 hours.    LDH:  No results for input(s): "LDH" in the last 72 hours.  Microbiology:  Microbiology Results (last 7 days)       Procedure Component Value Units Date/Time    Blood culture [[id number]]  (Normal) Collected: 04/27/25 0923    Order Status: Completed Specimen: Blood from Peripheral, Antecubital, Right Updated: 05/02/25 1101     Blood Culture No Growth After 5 Days    Blood culture [[id number]]  (Normal) Collected: 04/27/25 0942    Order Status: Completed Specimen: Blood from Peripheral, Antecubital, Left Updated: 05/02/25 1101     Blood Culture No Growth After 5 Days    Urine culture [[id number]]  (Abnormal)  (Susceptibility) Collected: 04/27/25 0845    Order Status: Completed Specimen: Urine, Catheterized Updated: 05/01/25 0222     Urine Culture >100,000 cfu/ml Enterococcus faecalis    Blood culture [[id number]]  (Normal) Collected: 04/25/25 0319    Order Status: Completed Specimen: Blood Updated: 04/30/25 1802     Blood Culture No Growth After 5 Days    Blood culture [[id number]]  (Normal) Collected: 04/25/25 0324    Order Status: Completed Specimen: Blood Updated: 04/30/25 1802     Blood Culture No Growth After 5 Days            I have reviewed all pertinent labs within the past 24 hours.    Estimated Creatinine Clearance: 12.5 mL/min (A) (based on SCr of 3.3 mg/dL (H)).    Diagnostic Results:  I have reviewed and interpreted all pertinent imaging results/findings within the past 24 hours.  "

## 2025-05-05 NOTE — ASSESSMENT & PLAN NOTE
-Unclear etiology but suspect possible CAV as cause   -Patient now starting to wake up and follow commands, CTH w/ no acute findings  -Pulm consulted for assistance with exubation.  Corticosteroids given overnight and patient extubated 4/29  -patient made DNR on 4/30

## 2025-05-05 NOTE — PLAN OF CARE
AAOx4, She is keeping within her 2L restrictions consciously. Patient using a purwick when in bed for ease of use, voiding clear yellow urine. She has been eating better today and tolerated renal boost well. No c/o of dizziness while ambulating. Patient ambulating ok with walker, but remain's standby assist as needed. She is reminded to call if she needs assistance. She still has pain on her ribs due to having CPR previously and is being managed with her oxy 5mg (given X1) can have Q4. Her Phos was 1.0, currently getting Kphos IVPB and tolerating with no complications. Patient has had 2 bowel movements this shift, bowel movements still loose/watery. Patient remains optimistic and ready to go home to her nursing home and eventually back to her assisted living. Reminded patient  to call to let us help her if she needs it. She remains independent and wants to constantly clean and  every little things. She is currently in bed with purewick in place, SR up x2, in bariatric immersion  bed, call light, and bed remote, belongings within reach.

## 2025-05-05 NOTE — PLAN OF CARE
Patient is AAOX4, calm, cooperative and accepting. VSS. Afebrile. Spo2 maintained@RA. BP is WNL. NSR on Tele.  Scheduled medicines given as per MAR. AmbienX1 given before bedtime. Antibiotic Ampicillin dced on 5/4. Tylenol given for abdominal pain along with some hot packs, moderate relief obtained.  Patient complained of gas pain too this am, MaaloxX1 given, some relief obtained. 2 BM this shift. Ambulated up to toilet with a walker and stand by assist. Free from falls and injuries this shift. Bed in low position, wheels locked, call light within patient's reach. Aware of calling for assistance.

## 2025-05-05 NOTE — PROGRESS NOTES
Follow-up    L/m for Sonia, SW, at [facility] ([phone]) regarding SNF auth. Also, received email from Antonieta,  Health Advocate, with [employer] ([phone], ext. [phone]) stating she received a letter informing her that pt was granted a hospital exemption until 6/11/25. Antonieta inquired about pt's dc plans to better assist pt with case mgmt services. Responded to Antonieta informing her that pt was accepted to [facility] and awaiting insurance decision regarding auth. Will continue to f/u with SW at [facility] regarding auth.    15:26   S/w Meagan in admissions at [facility] regarding insurance decision for [facility] SNF and informed [employer] denied SNF. Meagan stated that pt's insurance offered a P2P tomorrow by 1:00 pm by calling [phone]. Informed Dr. Dyson that [employer] denied SNF and provided P2P information. Also, f/u with pt to inform of insurance denial. Pt was AA&Ox4 with pleasant affect. Pt was sitting up in chair. Discussed HH services (SN, HHA, PT, and OT) with pt as an option. Informed pt that HIMA Curry, at [facility] ([phone]) also stated that she will assist her with LTC-PCS aleksandar and provide wellness checks. Pt was amenable to dc home with HH services. Pt was amenable to [facility] ([phone]). Pt stated that she has to transfer money to her friend to pay for her apt and inquired about steps. Informed pt that SW cannot assist her with logging into her acct as SW do not want to be privy to her information. Pt voiced understanding of above and asked if SW can verbally tell her the steps. Verbally provided pt with steps. Pt then stated that she can wait to pay her rent if dc'd this week. Informed Dr. Dyson that pt was amenable to HH services and HIMA Curry, at [facility] will assist pt with applying for LT-PCS and provide wellness checks. Requested HH orders. Following and available.     16:24  HH orders received. Referral completed with [facility] and provided required documents via Epic in  Basket.

## 2025-05-05 NOTE — PT/OT/SLP PROGRESS
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  She presents with good participation. Performance deficits affecting function are weakness, impaired endurance, impaired self care skills, impaired functional mobility, gait instability, impaired balance.     Rehab Prognosis:  Good; patient would benefit from acute skilled OT services to address these deficits and reach maximum level of function.       Plan:     Patient to be seen 4 x/week to address the above listed problems via self-care/home management, therapeutic activities, therapeutic exercises, neuromuscular re-education  Plan of Care Expires: 05/28/25  Plan of Care Reviewed with: patient    Subjective     Chief Complaint: Rib pain  Patient/Family Comments/goals: return home  Pain/Comfort:  Pain Rating 1:  (not rated)  Location - Orientation 1: generalized  Location 1: rib(s)  Pain Addressed 1: Reposition, Distraction    Objective:     Communicated with: Nsg prior to session.  Patient found HOB elevated with blood pressure cuff, pulse ox (continuous), telemetry, PureWick upon OT entry to room.    General Precautions: Standard, fall    Orthopedic Precautions:N/A  Braces: N/A  Respiratory Status: Room air     Occupational Performance:     Bed Mobility:    Patient completed Scooting/Bridging with modified independence  Patient completed Supine to Sit with modified independence     Functional Mobility/Transfers:  Patient completed Sit <> Stand Transfer with stand by assistance  with  rolling walker    Patient completed Toilet Transfer Step Transfer technique with supervision with  rolling walker  Chair t/f c/ Sup using RW  Functional Mobility: Pt ambulated room and hallway level c/ CGA to SBA using RW to simulate safe home and community mobility, and visual scanning needed for ADL and IADL participation     Activities of Daily Living:  Upper Body Dressing: stand by assistance don gown for backside  Lower Body Dressing: stand by assistance don shoes and doff socks  Toileting: supervision        [facility] 6 Click ADL: 23    Treatment & Education:  Pt educated on role and purpose of therapy  Pt educated on goal setting  Pt educated on benefits of OOB activity  Pt educated on self advocacy     Patient left up in chair with all lines intact, call button in reach, and nsg notified    GOALS:   Multidisciplinary Problems       Occupational Therapy Goals          Problem: Occupational Therapy    Goal Priority Disciplines Outcome Interventions   Occupational Therapy Goal     OT, PT/OT Progressing    Description: Goals to be met by: 5/28/25.     Patient will increase functional independence with ADLs by performing:    UE Dressing with Stand-by Assistance.  LE Dressing with Stand-by Assistance.  Grooming while standing at sink with Stand-by Assistance.  Toileting from toilet with Stand-by Assistance for hygiene and clothing management.   Toilet transfer to toilet with Stand-by Assistance.  Upper extremity exercise program x10 reps per handout, with independence.                         DME Justifications:  No DME recommended requiring DME justifications    Time Tracking:     OT Date of Treatment: 05/05/25  OT Start Time: 1402  OT Stop Time: 1428  OT Total Time (min): 26 min    Billable Minutes:Self Care/Home Management 13  Therapeutic Activity 13    OT/FIORELLA: OT          5/5/2025

## 2025-05-06 VITALS
TEMPERATURE: 98 F | DIASTOLIC BLOOD PRESSURE: 72 MMHG | HEART RATE: 87 BPM | BODY MASS INDEX: 24.14 KG/M2 | RESPIRATION RATE: 18 BRPM | HEIGHT: 62 IN | WEIGHT: 131.19 LBS | OXYGEN SATURATION: 100 % | SYSTOLIC BLOOD PRESSURE: 128 MMHG

## 2025-05-06 PROBLEM — I46.9 CARDIAC ARREST: Status: RESOLVED | Noted: 2025-04-27 | Resolved: 2025-05-06

## 2025-05-06 LAB
ABSOLUTE EOSINOPHIL (OHS): 0.13 K/UL
ABSOLUTE MONOCYTE (OHS): 0.58 K/UL (ref 0.3–1)
ABSOLUTE NEUTROPHIL COUNT (OHS): 3.05 K/UL (ref 1.8–7.7)
ALBUMIN SERPL BCP-MCNC: 3 G/DL (ref 3.5–5.2)
ALP SERPL-CCNC: 155 UNIT/L (ref 40–150)
ALT SERPL W/O P-5'-P-CCNC: 43 UNIT/L (ref 10–44)
ANION GAP (OHS): 11 MMOL/L (ref 8–16)
AST SERPL-CCNC: 30 UNIT/L (ref 11–45)
BASOPHILS # BLD AUTO: 0.01 K/UL
BASOPHILS NFR BLD AUTO: 0.2 %
BILIRUB SERPL-MCNC: 0.5 MG/DL (ref 0.1–1)
BUN SERPL-MCNC: 48 MG/DL (ref 8–23)
CALCIUM SERPL-MCNC: 8.4 MG/DL (ref 8.7–10.5)
CHLORIDE SERPL-SCNC: 106 MMOL/L (ref 95–110)
CO2 SERPL-SCNC: 22 MMOL/L (ref 23–29)
CREAT SERPL-MCNC: 2.9 MG/DL (ref 0.5–1.4)
CYCLOSPORINE BLD LC/MS/MS-MCNC: 60 NG/ML (ref 100–400)
ERYTHROCYTE [DISTWIDTH] IN BLOOD BY AUTOMATED COUNT: 15.9 % (ref 11.5–14.5)
GFR SERPLBLD CREATININE-BSD FMLA CKD-EPI: 17 ML/MIN/1.73/M2
GLUCOSE SERPL-MCNC: 79 MG/DL (ref 70–110)
HCT VFR BLD AUTO: 27 % (ref 37–48.5)
HGB BLD-MCNC: 8.8 GM/DL (ref 12–16)
IMM GRANULOCYTES # BLD AUTO: 0.08 K/UL (ref 0–0.04)
IMM GRANULOCYTES NFR BLD AUTO: 1.7 % (ref 0–0.5)
LYMPHOCYTES # BLD AUTO: 0.86 K/UL (ref 1–4.8)
MAGNESIUM SERPL-MCNC: 2.5 MG/DL (ref 1.6–2.6)
MCH RBC QN AUTO: 28.7 PG (ref 27–31)
MCHC RBC AUTO-ENTMCNC: 32.6 G/DL (ref 32–36)
MCV RBC AUTO: 88 FL (ref 82–98)
NUCLEATED RBC (/100WBC) (OHS): 0 /100 WBC
PHOSPHATE SERPL-MCNC: 2.9 MG/DL (ref 2.7–4.5)
PLATELET # BLD AUTO: 189 K/UL (ref 150–450)
PMV BLD AUTO: 10.2 FL (ref 9.2–12.9)
POTASSIUM SERPL-SCNC: 4 MMOL/L (ref 3.5–5.1)
PROT SERPL-MCNC: 7.3 GM/DL (ref 6–8.4)
RBC # BLD AUTO: 3.07 M/UL (ref 4–5.4)
RELATIVE EOSINOPHIL (OHS): 2.8 %
RELATIVE LYMPHOCYTE (OHS): 18.3 % (ref 18–48)
RELATIVE MONOCYTE (OHS): 12.3 % (ref 4–15)
RELATIVE NEUTROPHIL (OHS): 64.7 % (ref 38–73)
SODIUM SERPL-SCNC: 139 MMOL/L (ref 136–145)
WBC # BLD AUTO: 4.71 K/UL (ref 3.9–12.7)

## 2025-05-06 PROCEDURE — 97535 SELF CARE MNGMENT TRAINING: CPT | Mod: HCNC

## 2025-05-06 PROCEDURE — 85025 COMPLETE CBC W/AUTO DIFF WBC: CPT | Mod: HCNC

## 2025-05-06 PROCEDURE — 99233 SBSQ HOSP IP/OBS HIGH 50: CPT | Mod: HCNC,,, | Performed by: INTERNAL MEDICINE

## 2025-05-06 PROCEDURE — 97530 THERAPEUTIC ACTIVITIES: CPT | Mod: HCNC

## 2025-05-06 PROCEDURE — 80158 DRUG ASSAY CYCLOSPORINE: CPT | Mod: HCNC | Performed by: INTERNAL MEDICINE

## 2025-05-06 PROCEDURE — 25000003 PHARM REV CODE 250: Mod: HCNC | Performed by: STUDENT IN AN ORGANIZED HEALTH CARE EDUCATION/TRAINING PROGRAM

## 2025-05-06 PROCEDURE — 36415 COLL VENOUS BLD VENIPUNCTURE: CPT | Mod: HCNC

## 2025-05-06 PROCEDURE — 82040 ASSAY OF SERUM ALBUMIN: CPT | Mod: HCNC

## 2025-05-06 PROCEDURE — 25000242 PHARM REV CODE 250 ALT 637 W/ HCPCS: Mod: HCNC

## 2025-05-06 PROCEDURE — 84100 ASSAY OF PHOSPHORUS: CPT | Mod: HCNC

## 2025-05-06 PROCEDURE — 25000003 PHARM REV CODE 250: Mod: HCNC | Performed by: INTERNAL MEDICINE

## 2025-05-06 PROCEDURE — 63600175 PHARM REV CODE 636 W HCPCS: Mod: HCNC | Performed by: PHYSICIAN ASSISTANT

## 2025-05-06 PROCEDURE — 83735 ASSAY OF MAGNESIUM: CPT | Mod: HCNC

## 2025-05-06 PROCEDURE — 97116 GAIT TRAINING THERAPY: CPT | Mod: HCNC,CQ

## 2025-05-06 PROCEDURE — 25000003 PHARM REV CODE 250: Mod: HCNC

## 2025-05-06 PROCEDURE — 25000003 PHARM REV CODE 250: Mod: HCNC | Performed by: PHYSICIAN ASSISTANT

## 2025-05-06 RX ORDER — CYCLOSPORINE 100 MG/ML
50 SOLUTION ORAL EVERY 12 HOURS
Qty: 50 ML | Refills: 12 | Status: SHIPPED | OUTPATIENT
Start: 2025-05-06 | End: 2026-05-06

## 2025-05-06 RX ORDER — OXYCODONE HYDROCHLORIDE 5 MG/1
5 TABLET ORAL EVERY 4 HOURS PRN
Qty: 42 TABLET | Refills: 0 | Status: SHIPPED | OUTPATIENT
Start: 2025-05-06 | End: 2025-05-06

## 2025-05-06 RX ORDER — OXYCODONE HYDROCHLORIDE 5 MG/1
5 TABLET ORAL EVERY 4 HOURS PRN
Qty: 42 TABLET | Refills: 0 | Status: SHIPPED | OUTPATIENT
Start: 2025-05-06 | End: 2025-05-13

## 2025-05-06 RX ORDER — PREGABALIN 50 MG/1
50 CAPSULE ORAL NIGHTLY
Qty: 30 CAPSULE | Refills: 6 | Status: SHIPPED | OUTPATIENT
Start: 2025-05-06 | End: 2025-11-04

## 2025-05-06 RX ORDER — PANTOPRAZOLE SODIUM 40 MG/1
40 TABLET, DELAYED RELEASE ORAL DAILY
Qty: 90 TABLET | Refills: 3 | Status: SHIPPED | OUTPATIENT
Start: 2025-05-07 | End: 2026-05-07

## 2025-05-06 RX ADMIN — PANTOPRAZOLE SODIUM 40 MG: 40 TABLET, DELAYED RELEASE ORAL at 08:05

## 2025-05-06 RX ADMIN — GUAIFENESIN, DEXTROMETHORPHAN HBR 1 TABLET: 600; 30 TABLET ORAL at 08:05

## 2025-05-06 RX ADMIN — SIMETHICONE 80 MG: 80 TABLET, CHEWABLE ORAL at 10:05

## 2025-05-06 RX ADMIN — CALCITRIOL CAPSULES 0.25 MCG 0.25 MCG: 0.25 CAPSULE ORAL at 08:05

## 2025-05-06 RX ADMIN — TIZANIDINE 4 MG: 4 TABLET ORAL at 08:05

## 2025-05-06 RX ADMIN — NIFEDIPINE 60 MG: 30 TABLET, FILM COATED, EXTENDED RELEASE ORAL at 08:05

## 2025-05-06 RX ADMIN — PSYLLIUM HUSK 1 PACKET: 3.4 POWDER ORAL at 08:05

## 2025-05-06 RX ADMIN — CARVEDILOL 3.12 MG: 3.12 TABLET, FILM COATED ORAL at 08:05

## 2025-05-06 RX ADMIN — ASPIRIN 81 MG CHEWABLE TABLET 81 MG: 81 TABLET CHEWABLE at 08:05

## 2025-05-06 RX ADMIN — CYCLOSPORINE 50 MG: 100 SOLUTION ORAL at 08:05

## 2025-05-06 RX ADMIN — SERTRALINE HYDROCHLORIDE 25 MG: 25 TABLET ORAL at 08:05

## 2025-05-06 NOTE — PLAN OF CARE
AAOx4.  Ambulating w walker.  VSS.  Afebrile.  Nsr on tele.  On room air.  Up to br, voiding without difficulty and having BM's.  Gasx given for gas discomfort.  DNR order in place.  Fall pxns maintained, nonskids on, call bell in reach, bed in lowest locked position and chair wheels locked.  Awaiting insurance approval for snf.

## 2025-05-06 NOTE — ASSESSMENT & PLAN NOTE
Patient complaining of dysuria. UA positive for urinary infection with high WBC and bacteriuria. Urine cultures positive for E. Faecalis.    Plan:   - Started ampicillin 2 gr daily on 5/1. Course completed.   - zyrtec for itching and mucinex for mucus (Xray chest unchanged)

## 2025-05-06 NOTE — ASSESSMENT & PLAN NOTE
Patient with history of CKD, now GRACE on CKD due to ATN after cardiac arrest.   Baseline creatinine around 2.6. Last creatinine 3.8    Plan:   - Will give fluids, 500 ml   - Avoid nephrotoxins   - Daily BMP   -outpatient follow up with nephrology for arrangement for dialysis

## 2025-05-06 NOTE — SUBJECTIVE & OBJECTIVE
Interval History: Pt doing well this AM, asymptomatic. No acute events overnight.     Plan for discharge today with home health. Pt will go PT near her home after completion of home health PT. Discussed with MSW    Cyclosporine dosing discussed with pharmacy.    Continuous Infusions:  Scheduled Meds:   aspirin  81 mg Oral Daily    calcitRIOL  0.25 mcg Oral Daily    carvediloL  3.125 mg Oral BID WM    cycloSPORINE  50 mg Oral BID    dextromethorphan-guaiFENesin  mg  1 tablet Oral BID    enoxparin  30 mg Subcutaneous Q24H (prophylaxis, 1700)    NIFEdipine  60 mg Oral Daily    pantoprazole  40 mg Oral Daily    pregabalin  50 mg Oral QHS    psyllium husk  1 packet Oral Daily    sertraline  25 mg Oral Daily    sucralfate  1 g Oral Q6H    tiZANidine  4 mg Oral BID     PRN Meds:  Current Facility-Administered Medications:     acetaminophen, 650 mg, Oral, Q4H PRN    aluminum-magnesium hydroxide-simethicone, 30 mL, Oral, Q6H PRN    morphine, 2 mg, Intravenous, Q4H PRN    ondansetron, 8 mg, Oral, Q8H PRN    oxyCODONE, 5 mg, Oral, Q4H PRN    polyethylene glycol, 17 g, Oral, BID PRN    simethicone, 1 tablet, Oral, TID PRN    sodium chloride 0.9%, 10 mL, Intravenous, PRN    zolpidem, 5 mg, Oral, Nightly PRN    Review of patient's allergies indicates:   Allergen Reactions    Dobutamine Other (See Comments)     Severe Left sided chest pain after dosage administered for Dobutamine Stress Test; itching    Lisinopril Swelling and Rash    Hydrocodone-acetaminophen Nausea Only    Augmentin [amoxicillin-pot clavulanate] Diarrhea    Zyvox [linezolid] Nausea And Vomiting     Objective:     Vital Signs (Most Recent):  Temp: 97.8 °F (36.6 °C) (05/06/25 1158)  Pulse: 87 (05/06/25 1158)  Resp: 18 (05/06/25 1158)  BP: 128/72 (05/06/25 1158)  SpO2: 100 % (05/06/25 1158) Vital Signs (24h Range):  Temp:  [97.8 °F (36.6 °C)-98.9 °F (37.2 °C)] 97.8 °F (36.6 °C)  Pulse:  [79-97] 87  Resp:  [18] 18  SpO2:  [95 %-100 %] 100 %  BP:  "()/(61-78) 128/72     Patient Vitals for the past 72 hrs (Last 3 readings):   Weight   05/05/25 0448 59.5 kg (131 lb 2.8 oz)   05/04/25 0516 58.2 kg (128 lb 4.9 oz)     Body mass index is 23.99 kg/m².      Intake/Output Summary (Last 24 hours) at 5/6/2025 1335  Last data filed at 5/6/2025 0745  Gross per 24 hour   Intake 710 ml   Output --   Net 710 ml       Hemodynamic Parameters:       Telemetry: NSR       Physical Exam  Constitutional:       Appearance: Normal appearance.   HENT:      Head: Normocephalic.      Mouth/Throat:      Mouth: Mucous membranes are moist.   Eyes:      Pupils: Pupils are equal, round, and reactive to light.   Cardiovascular:      Rate and Rhythm: Normal rate and regular rhythm.      Heart sounds: No murmur heard.  Pulmonary:      Effort: Pulmonary effort is normal.   Chest:      Chest wall: No tenderness.   Abdominal:      General: Abdomen is flat.      Palpations: Abdomen is soft.   Musculoskeletal:      Right lower leg: No edema.      Left lower leg: No edema.   Neurological:      Mental Status: She is alert.            Significant Labs:  CBC:  Recent Labs   Lab 05/04/25  1018 05/05/25  0652 05/06/25  0749   WBC 3.58* 3.90 4.71   RBC 2.73* 3.03* 3.07*   HGB 7.9* 8.7* 8.8*   HCT 24.4* 27.0* 27.0*    206 189   MCV 89 89 88   MCH 28.9 28.7 28.7   MCHC 32.4 32.2 32.6     BNP:  No results for input(s): "BNP" in the last 168 hours.    Invalid input(s): "BNPTRIAGELBLO"  CMP:  Recent Labs   Lab 05/04/25  1018 05/05/25  0652 05/06/25  0749   GLU 94 92 79   CALCIUM 7.8* 7.9* 8.4*   ALBUMIN 2.7* 2.8* 3.0*   PROT 6.7 7.2 7.3    135* 139   K 3.8 4.2 4.0   CO2 20* 22* 22*    104 106   BUN 54* 54* 48*   CREATININE 3.7* 3.3* 2.9*   ALKPHOS 105 140 155*   ALT 54* 49* 43   AST 32 30 30   BILITOT 0.6 0.5 0.5      Coagulation:   No results for input(s): "PT", "INR", "APTT" in the last 168 hours.    LDH:  No results for input(s): "LDH" in the last 72 " hours.  Microbiology:  Microbiology Results (last 7 days)       Procedure Component Value Units Date/Time    Blood culture [[id number]]  (Normal) Collected: 04/27/25 0923    Order Status: Completed Specimen: Blood from Peripheral, Antecubital, Right Updated: 05/02/25 1101     Blood Culture No Growth After 5 Days    Blood culture [[id number]]  (Normal) Collected: 04/27/25 0942    Order Status: Completed Specimen: Blood from Peripheral, Antecubital, Left Updated: 05/02/25 1101     Blood Culture No Growth After 5 Days    Urine culture [[id number]]  (Abnormal)  (Susceptibility) Collected: 04/27/25 0845    Order Status: Completed Specimen: Urine, Catheterized Updated: 05/01/25 0222     Urine Culture >100,000 cfu/ml Enterococcus faecalis    Blood culture [[id number]]  (Normal) Collected: 04/25/25 0319    Order Status: Completed Specimen: Blood Updated: 04/30/25 1802     Blood Culture No Growth After 5 Days    Blood culture [[id number]]  (Normal) Collected: 04/25/25 0324    Order Status: Completed Specimen: Blood Updated: 04/30/25 1802     Blood Culture No Growth After 5 Days            I have reviewed all pertinent labs within the past 24 hours.    Estimated Creatinine Clearance: 14.3 mL/min (A) (based on SCr of 2.9 mg/dL (H)).    Diagnostic Results:  I have reviewed and interpreted all pertinent imaging results/findings within the past 24 hours.

## 2025-05-06 NOTE — DISCHARGE SUMMARY
[facility] - Transplant Stepdown  Heart Transplant  Discharge Summary      Patient Name: Nadia Damon  MRN: [id number]  Admission Date: 4/24/2025  Hospital Length of Stay: 11 days  Discharge Date and Time: 05/06/2025 4:34 PM  Attending Physician: No att. providers found   Discharging Provider: Keri Dyson MD  Primary Care Provider: Elis Wick MD     HPI: Ms. Damon is a 70 y/o AAF s/p OHTx 5/27/93 at [facility], s/p PPM same date, mild anemia and leukopenia, OA, cervical spine DJD with radiculopathy and chronic neck pain who presents for her 31st post annual transplant evaluation. Four years ago after her annual was found to have BRCA, underwent excision, chemo/radiation, had a rough time of it, with some complications related to it, however recovered well.      Transferred from [facility] ED due to chest pain. She initially was at the [facility] for pain management procedure for chronic back pain. In preprocedure area complained of nausea and chest pain having taken a sublingual nitro prior to arrival. Reported intermittent chest pain > 1 week. Took all regular medications on an empty stomach and per notes had n/v after starting IV. ECG with repol abnormalities. Noted to be hypokalemic and hypomagnesemic. Troponin I mildly elevated.     Off note, a few months ago was evaluated by  for a reducible ventral hernia containing portion of the liver no bowel noted on exam or imaging. At that time also reported chronic nausea. Elective hernia repair deemed not emergent and if she should require intervention to be done at center with cardiac surgery and Cardiology heart failure services.      Cardiac PET 11/15/2024    The myocardial perfusion images show no evidence of ischemia or scar.    The whole heart absolute myocardial perfusion values were elevated at rest, low normal during stress and CFR is mildly reduced in part due to elevated resting flow.    CT attenuation images demonstrate no coronary  calcifications and mild diffuse aortic calcifications in the ascending aorta, in the descending aorta and moderate calcfications in the aortic arch.    The gated perfusion images showed an ejection fraction of 56% at rest and 59% during stress. A normal ejection fraction is greater than 47%.    There is normal wall motion at rest and normal wall motion during stress.    The study's ECG is negative for ischemia.       TTE 07/20/2024:    Left Ventricle: The left ventricle is normal in size. Normal wall thickness. There is normal systolic function with a visually estimated ejection fraction of 55 - 60%. There is normal diastolic function.    Right Ventricle: Normal right ventricular cavity size. Wall thickness is normal. Systolic function is normal.    Left Atrium: Patent foramen ovale visualized with predominant right to left shunting indicated by saline contrast.    Tricuspid Valve: There is mild regurgitation.    IVC/SVC: Normal venous pressure at 3 mmHg.    The patient is status post cardiac transplantation.  PFO not present with bubble alone, but with valsalva had very small bubbles come across         Cxr: Comparison is made to January 4, 2023.  Electrical lead overlies the lower chest and upper abdomen.  Surgical clips overlie the upper abdomen.  Mediastinal silhouette is prominent.  The lungs are clear.  No pneumothorax or significant pleural effusion     DSE 05/24/21:  The left ventricle is normal in size with concentric remodeling and normal systolic function.  The patient reached the end of the protocol.  There were no arrhythmias during stress.  Severe left atrial enlargement.  The estimated ejection fraction is 55%.  Grade II left ventricular diastolic dysfunction.  Normal right ventricular size with normal right ventricular systolic function.  Mild-to-moderate mitral regurgitation.  Mild to moderate tricuspid regurgitation.  Normal central venous pressure (3 mmHg).  The estimated PA systolic pressure is  39 mmHg.  The stress echo portion of this study is negative for myocardial ischemia.  Severe left atrial enlargement.  The ECG portion of this study is negative for myocardial ischemia.    Procedure(s) (LRB):  Transesophageal echo (AYAAN) intra-procedure log documentation (N/A)     Hospital Course: Ms. Damon is a 70 y/o AAF s/p OHTx 5/27/93 at [facility], s/p PPM same date, mild anemia and leukopenia, OA, breast cancer w/ BRCA s/p excision, chemo/radiation, cervical spine DJD with radiculopathy and chronic neck pain who presented due to concern for chest pain. Pt was at at an outpatient appointment for neck pain and procedure was aborted due to pain. She was given sublingual nitroglycerin and admitted. Due to concern for possible dissection, pt underwent AYAAN, which was negative for dissection.  Patient was initially going to be discharged next day, however in the morning of planned discharge day, patient went to the restroom and strained for a bowel movement, and then got into bed and was found to be unresponsive by nursing staff.  One round of CPR was performed with 1 dose of epi, no shocks required.  Per MICU team on-call, at bedside, patient's initial rhythm was unknown.  Patient was intubated at that time and sent to the ICU.  ICU extubation was complicated by absence of a cuff leak, and patient was evaluated by pulmonology, given steroids and extubated.  Concern for possibility of CAV as cause for chest pain, however, unable to perform angiography given renal function.  Patient's PET stress test from 11/15/2024 was negative for ischemia, which would make CAV less likely.  Patient was treated with supportive care for pain and evaluated with PT and OT.  Patient plan for discharge with home health and home health PT/OT.  Patient can then follow up with physical therapy thereafter outpatient.  Patient discharged in stable condition with home health labs, care and outpatient follow-up.    Goals of Care  Treatment Preferences:  Code Status: DNR    Health care agent: Moy Watkins (son)  Health care agent number: [phone]    Living Will: Yes     What is most important right now is to focus on remaining as independent as possible, symptom/pain control, improvement in condition but with limits to invasive therapies.  Accordingly, we have decided that the best plan to meet the patient's goals includes continuing with treatment.      Consults (From admission, onward)          Status Ordering Provider     Inpatient consult to Palliative Care  Once        Provider:  (Not yet assigned)    Completed NORTH NEWELL     Inpatient consult to Pulmonology  Once        Provider:  (Not yet assigned)    Completed DIANA MARQUEZ     Inpatient consult to Midline team  Once        Provider:  (Not yet assigned)    Completed FRANCISCO WATTS            Significant Diagnostic Studies: Labs: BMP:   Recent Labs   Lab 05/05/25  0652 05/06/25  0749   GLU 92 79   * 139   K 4.2 4.0    106   CO2 22* 22*   BUN 54* 48*   CREATININE 3.3* 2.9*   CALCIUM 7.9* 8.4*   MG 3.0* 2.5   , CMP   Recent Labs   Lab 05/05/25  0652 05/06/25  0749   * 139   K 4.2 4.0    106   CO2 22* 22*   GLU 92 79   BUN 54* 48*   CREATININE 3.3* 2.9*   CALCIUM 7.9* 8.4*   PROT 7.2 7.3   ALBUMIN 2.8* 3.0*   BILITOT 0.5 0.5   ALKPHOS 140 155*   AST 30 30   ALT 49* 43   ANIONGAP 9 11   , CBC   Recent Labs   Lab 05/05/25  0652 05/06/25  0749   WBC 3.90 4.71   HGB 8.7* 8.8*   HCT 27.0* 27.0*    189   , and INR   Lab Results   Component Value Date    INR 1.0 04/27/2025    INR 1.0 11/12/2024    INR 1.0 07/20/2024    PROTIME 10.9 04/27/2025     Microbiology: Blood Culture   Lab Results   Component Value Date    LABBLOO No growth after 5 days. 08/25/2023    LABBLOO  08/25/2023     Gram stain aer bottle: Gram positive cocci in clusters resembling Staph    LABBLOO  08/25/2023     Results called to and read back by: Dr. Susan Jasso PhD 08/26/2023   14:05    LABBLOO (A) 08/25/2023     COAGULASE-NEGATIVE STAPHYLOCOCCUS SPECIES  Organism is a probable contaminant     , Sputum Culture   Lab Results   Component Value Date    RESPIRATORYC Normal respiratory yen 08/25/2023   , and Urine Culture    Lab Results   Component Value Date    LABURIN >100,000 cfu/ml Enterococcus faecalis (A) 04/27/2025     Radiology: X-Ray: CXR: X-Ray Chest 1 View (CXR):   Results for orders placed or performed during the hospital encounter of 04/24/25   X-Ray Chest 1 View    Narrative    EXAMINATION:  XR CHEST 1 VIEW    CLINICAL HISTORY:  chf;    TECHNIQUE:  Single frontal view of the chest was performed.    COMPARISON:  N 04/29/2025 one    FINDINGS:  The ET and NG tube has been removed.  Continued mild cardiomegaly.  No significant airspace consolidation or pleural effusion identified.      Impression    No significant changes      Electronically signed by: Omar Rosenberg MD  Date:    04/30/2025  Time:    08:56    and X-Ray Chest PA and Lateral (CXR): No results found for this visit on 04/24/25.    CT scan: CT ABDOMEN PELVIS WITH CONTRAST: No results found for this visit on 04/24/25.    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 [I25.758] 12/04/2024 Yes    Hypermagnesemia [E83.41] 05/03/2025 Unknown    GRACE (acute kidney injury) [N17.9] 05/02/2025 Yes    Palliative care encounter [Z51.5] 04/30/2025 Not Applicable    Heart transplanted [Z94.1] 04/30/2025 Not Applicable    ACP (advance care planning) [Z71.89] 04/30/2025 Not Applicable    CKD (chronic kidney disease), stage IV [N18.4] 04/28/2025 Yes    Endotracheally intubated [Z97.8] 04/28/2025 No    Anemia [D64.9] 04/25/2025 Yes    Hypokalemia [E87.6] 04/25/2025 Yes    Anemia associated with stage 5 chronic renal failure [N18.5, D63.1] 11/19/2024 Yes     Chronic    Chronic right-sided  thoracic back pain [M54.6, G89.29] 09/05/2024 Yes     Chronic    Chronic idiopathic constipation [K59.04] 06/26/2024 Yes     Chronic    Acute cystitis without hematuria [N30.00] 05/10/2022 Yes    Chest pain [R07.9] 04/05/2022 Yes     Chronic    Immunocompromised state due to drug therapy [D84.821, Z79.899] 11/29/2021 Not Applicable    Essential hypertension [I10] 04/13/2017 Yes     Chronic    S/P orthotopic heart transplant [Z94.1] 05/15/2013 Not Applicable      Problems Resolved During this Admission:    Diagnosis Date Noted Date Resolved POA    Cardiac arrest [I46.9] 04/27/2025 05/06/2025 No    Chronic nausea [R11.0] 04/25/2025 04/29/2025 Yes      Discharged Condition: stable    Disposition: Home or Self Care    Follow Up:    Patient Instructions:   No discharge procedures on file.  Medications:  Reconciled Home Medications:      Medication List        START taking these medications      cycloSPORINE 100 mg/mL microemulsion solution  Commonly known as: NEORAL  Take 0.5 mLs (50 mg total) by mouth every 12 (twelve) hours.  Replaces: cycloSPORINE modified (NEORAL) 25 MG capsule     dextromethorphan-guaiFENesin  mg  mg per 12 hr tablet  Commonly known as: MUCINEX DM  Take 1 tablet by mouth 2 (two) times daily. for 10 days     oxyCODONE 5 MG immediate release tablet  Commonly known as: ROXICODONE  Take 1 tablet (5 mg total) by mouth every 4 (four) hours as needed for Pain.            CHANGE how you take these medications      pantoprazole 40 MG tablet  Commonly known as: PROTONIX  Take 1 tablet (40 mg total) by mouth once daily.  Start taking on: May 7, 2025  What changed:   medication strength  how much to take  when to take this     pregabalin 50 MG capsule  Commonly known as: LYRICA  Take 1 capsule (50 mg total) by mouth every evening.  What changed: when to take this            CONTINUE taking these medications      aspirin 81 MG EC tablet  Commonly known as: ECOTRIN  Take 1 tablet (81 mg total) by  mouth once daily.     calcitRIOL 0.25 MCG Cap  Commonly known as: ROCALTROL  Take 1 capsule (0.25 mcg total) by mouth once daily.     carvediloL 3.125 MG tablet  Commonly known as: COREG  Take 1 tablet (3.125 mg total) by mouth 2 (two) times daily with meals.     furosemide 40 MG tablet  Commonly known as: LASIX  Take 1 tablet (40 mg total) by mouth once daily.     mupirocin 2 % ointment  Commonly known as: BACTROBAN  Apply topically 2 (two) times daily.     NIFEdipine 60 MG (OSM) 24 hr tablet  Commonly known as: PROCARDIA-XL  Take 1 tablet (60 mg total) by mouth once daily.     nitroGLYCERIN 0.4 MG SL tablet  Commonly known as: NITROSTAT  PLACE 1 TABLET UNDER THE TONGUE EVERY 5 MINS AS NEEDED FOR CHEST PAIN FOR UP TO 3 DOSES.IF CONTINUED HEART PAIN/PRESSURE AFTER     ondansetron 4 MG tablet  Commonly known as: ZOFRAN  Take 1 tablet (4 mg total) by mouth every 12 (twelve) hours as needed for Nausea.     oxyCODONE-acetaminophen 5-325 mg per tablet  Commonly known as: PERCOCET  Take 1 tablet by mouth every 4 (four) hours as needed for Pain.     polyethylene glycol 17 gram Pwpk  Commonly known as: GLYCOLAX  Take 17 g by mouth once daily.     sertraline 25 MG tablet  Commonly known as: ZOLOFT  Take 1 tablet (25 mg total) by mouth once daily.     sodium bicarbonate 650 MG tablet  Take 1 tablet (650 mg total) by mouth 2 (two) times daily.     temazepam 30 mg capsule  Commonly known as: RESTORIL  Take 1 capsule (30 mg total) by mouth nightly as needed for Insomnia.     tiZANidine 2 MG tablet  Commonly known as: ZANAFLEX  Take 2 tablets (4 mg total) by mouth 2 (two) times a day.     vitamin E 400 UNIT capsule  Take 400 Units by mouth once daily.            STOP taking these medications      cycloSPORINE modified (NEORAL) 25 MG capsule  Commonly known as: NEORAL  Replaced by: cycloSPORINE 100 mg/mL microemulsion solution            ASK your doctor about these medications      * VELTASSA 8.4 gram Pwpk  Generic drug: patiromer  calcium sorbitex  Take 1 packet (8.4 g total) by mouth once daily  Ask about: Should I take this medication?     * patiromer calcium sorbitex 8.4 gram Pwpk  Commonly known as: VELTASSA  Take 1 packet (8.4 g total) by mouth once daily.  Ask about: Should I take this medication?           * This list has 2 medication(s) that are the same as other medications prescribed for you. Read the directions carefully, and ask your doctor or other care provider to review them with you.                  Keri Dyson MD  Heart Transplant  [facility] - Transplant Stepdown

## 2025-05-06 NOTE — PT/OT/SLP PROGRESS
Physical Therapy Treatment    Patient Name:  Nadia Damon   MRN:  [id number]    Recommendations:     Discharge Recommendations: Low Intensity Therapy  Discharge Equipment Recommendations: none  Barriers to discharge: Decreased caregiver support  CO-TX with OT for pt safety and max participation with both disciplines.       Assessment:     Nadia Damon is a 69 y.o. 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  She presents with the following impairments/functional limitations: weakness, impaired endurance, impaired self care skills, impaired functional mobility, gait instability, impaired balance .Progressing with PT Intervention. Pt Progressing with improving gait distance. Pt would continue to benefit from skilled PT to address overall function al mobility, goals , and to return to functional baseline.  Goals remain appropriate.     Rehab Prognosis: Good; patient would benefit from acute skilled PT services to address these deficits and reach maximum level of function.    Recent Surgery: Procedure(s) (LRB):  Transesophageal echo (AYAAN) intra-procedure log documentation (N/A) 10 Days Post-Op    Plan:     During this hospitalization, patient to be seen 4 x/week to address the identified rehab impairments via gait training, therapeutic activities, therapeutic exercises, neuromuscular re-education and progress toward the following goals:    Plan of Care Expires:  05/30/25    Subjective     Chief Complaint: heartburn  Patient/Family Comments/goals: I am leaving today  Pain/Comfort:  Pain Rating 1: 0/10      Objective:     Communicated with RN prior to session.  Patient found HOB elevated with  (blood pressure cuff; pulse ox (continuous); telemetry) upon PT entry to room.     General Precautions: Standard, fall  Orthopedic Precautions: N/A  Braces: N/A  Respiratory Status: Room air     Functional Mobility:  Bed Mobility:     Scooting:  supervision  Supine to Sit: supervision  Transfers:     Sit to Stand:  supervision with straight cane  Gait: pt amb with SPC x 250 ft with SBA for safety vcs for upright posture      AM-PAC 6 CLICK MOBILITY  Turning over in bed (including adjusting bedclothes, sheets and blankets)?: 3  Sitting down on and standing up from a chair with arms (e.g., wheelchair, bedside commode, etc.): 3  Moving from lying on back to sitting on the side of the bed?: 3  Moving to and from a bed to a chair (including a wheelchair)?: 3  Need to walk in hospital room?: 3  Climbing 3-5 steps with a railing?: 2  Basic Mobility Total Score: 17       Treatment & Education:  Therapist provided instruction and educated of patient on progress, safety, d/c, PT POC,   proper body mechanics, energy conservation, and fall prevention strategies during tasks listed above, on the effects of prolonged immobility and the importance of performing OOB activity and exercises to promote healing and reduce recovery time     Patient facilitated therex  seated  B LE AROM AP, LAQ, Hip Flexion, Hip Abd/Add. Patient required skilled PTA for instruction of exercises and appropriate cues to perform exercises safely, sequencing and appropriately.   Exercises are performed to develop and maintain pt's strength, endurance and flexibility.  Updated white board with appropriate PT mobility information for medical team notification   Donned an extra gown   Pt safe to ambulate in hallway with RN or PCT assistance  Call nursing/pct to transfer to chair/use bathroom. Pt stated understanding  Bedside table in front of patient and area set up for function, convenience, and safety. RN aware of patient's mobility needs and status. Questions/concerns addressed within PTA scope of practice, patient with no further questions. Time was provided for active listening, discussion of health disposition, and discussion of safe discharge. Pt?verbalized?agreement .    Patient left sitting  edge of bed with all lines intact, call button in reach, and nsg notified..    GOALS:   Multidisciplinary Problems       Physical Therapy Goals          Problem: Physical Therapy    Goal Priority Disciplines Outcome Interventions   Physical Therapy Goal     PT, PT/OT Progressing    Description: Goals to be met by: 2025     Patient will increase functional independence with mobility by performin. Supine to sit with [place]  2. Sit to supine with [place]  3. Sit to stand transfer with Supervision  4. Gait  x 100 feet with Stand-by Assistance using LRAD .   5. Lower extremity exercise program x15 reps per handout, with independence                       Time Tracking:     PT Received On: 25  PT Start Time: 1038     PT Stop Time: 1102  PT Total Time (min): 24 min     Billable Minutes: Gait Training 24    Treatment Type: Treatment  PT/PTA: PTA     Number of PTA visits since last PT visit: 2     2025

## 2025-05-06 NOTE — ASSESSMENT & PLAN NOTE
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  RV function moderately reduced.  Mild to Mod TR. LVEDD: 4cm  -Immunosuppression: cyclosporine with goal . Dose reduced for renal function

## 2025-05-06 NOTE — PROGRESS NOTES
Pt. d/c to home via w/c; educated pt.on medications and d/c instructions; verbalized understanding well.

## 2025-05-06 NOTE — HOSPITAL COURSE
Ms. Damon is a 68 y/o AAF s/p OHTx 5/27/93 at [facility], s/p PPM same date, mild anemia and leukopenia, OA, breast cancer w/ BRCA s/p excision, chemo/radiation, cervical spine DJD with radiculopathy and chronic neck pain who presented due to concern for chest pain. Pt was at at an outpatient appointment for neck pain and procedure was aborted due to pain. She was given sublingual nitroglycerin and admitted. Due to concern for possible dissection, pt underwent AYAAN, which was negative for dissection.  Patient was initially going to be discharged next day, however in the morning of planned discharge day, patient went to the restroom and strained for a bowel movement, and then got into bed and was found to be unresponsive by nursing staff.  One round of CPR was performed with 1 dose of epi, no shocks required.  Per MICU team on-call, at bedside, patient's initial rhythm was unknown.  Patient was intubated at that time and sent to the ICU.  ICU extubation was complicated by absence of a cuff leak, and patient was evaluated by pulmonology, given steroids and extubated.  Concern for possibility of CAV as cause for chest pain, however, unable to perform angiography given renal function.  Patient's PET stress test from 11/15/2024 was negative for ischemia, which would make CAV less likely.  Patient was treated with supportive care for pain and evaluated with PT and OT.  Patient plan for discharge with home health and home health PT/OT.  Patient can then follow up with physical therapy thereafter outpatient.  Patient discharged in stable condition with home health labs, care and outpatient follow-up.

## 2025-05-06 NOTE — PROGRESS NOTES
Discharge Note:    Pt to be dc home today per team.  Order for Home Health placed. HIMA contacted [facility] with [facility]  to f/up and ensure referral was accepted.  Pt to be admitted tomorrow.   Pt also needs ride home per team.     HIMA met with pt who was alert and oriented.  Pt voiced understanding and agreement with dc plans for today.  Pt requested to have [facility] follow at her apt.  Pt to return to [facility].   Pt requested SW to contact [email or web address] for ride home.    HIMA contacted [email or web address] Transportation [phone] to arrange ride for 1pm today.   confirmed. Ref: [id number]    Pt advised of dc plans.  Pt voiced understanding and agreement.  Updated nursing of plans.  Provided contact for transportation.    HIMA remains available.     [facility] of  [phone]

## 2025-05-06 NOTE — PROGRESS NOTES
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 No acute events overnight.     Plan for discharge today with home health. Pt will go PT near her home after completion of home health PT. Discussed with MSW    Cyclosporine dosing discussed with pharmacy.    Continuous Infusions:  Scheduled Meds:   aspirin  81 mg Oral Daily    calcitRIOL  0.25 mcg Oral Daily    carvediloL  3.125 mg Oral BID WM    cycloSPORINE  50 mg Oral BID    dextromethorphan-guaiFENesin  mg  1 tablet Oral BID    enoxparin  30 mg Subcutaneous Q24H (prophylaxis, 1700)    NIFEdipine  60 mg Oral Daily    pantoprazole  40 mg Oral Daily    pregabalin  50 mg Oral QHS    psyllium husk  1 packet Oral Daily    sertraline  25 mg Oral Daily    sucralfate  1 g Oral Q6H    tiZANidine  4 mg Oral BID     PRN Meds:  Current Facility-Administered Medications:     acetaminophen, 650 mg, Oral, Q4H PRN    aluminum-magnesium hydroxide-simethicone, 30 mL, Oral, Q6H PRN    morphine, 2 mg, Intravenous, Q4H PRN    ondansetron, 8 mg, Oral, Q8H PRN    oxyCODONE, 5 mg, Oral, Q4H PRN    polyethylene glycol, 17 g, Oral, BID PRN    simethicone, 1 tablet, Oral, TID PRN    sodium chloride 0.9%, 10 mL, Intravenous, PRN    zolpidem, 5 mg, Oral, Nightly PRN    Review of patient's allergies indicates:   Allergen Reactions    Dobutamine Other (See Comments)     Severe Left sided chest pain after dosage administered for Dobutamine Stress Test; itching    Lisinopril Swelling and Rash    Hydrocodone-acetaminophen Nausea Only    Augmentin [amoxicillin-pot clavulanate] Diarrhea    Zyvox [linezolid] Nausea And  "Vomiting     Objective:     Vital Signs (Most Recent):  Temp: 97.8 °F (36.6 °C) (05/06/25 1158)  Pulse: 87 (05/06/25 1158)  Resp: 18 (05/06/25 1158)  BP: 128/72 (05/06/25 1158)  SpO2: 100 % (05/06/25 1158) Vital Signs (24h Range):  Temp:  [97.8 °F (36.6 °C)-98.9 °F (37.2 °C)] 97.8 °F (36.6 °C)  Pulse:  [79-97] 87  Resp:  [18] 18  SpO2:  [95 %-100 %] 100 %  BP: ()/(61-78) 128/72     Patient Vitals for the past 72 hrs (Last 3 readings):   Weight   05/05/25 0448 59.5 kg (131 lb 2.8 oz)   05/04/25 0516 58.2 kg (128 lb 4.9 oz)     Body mass index is 23.99 kg/m².      Intake/Output Summary (Last 24 hours) at 5/6/2025 1335  Last data filed at 5/6/2025 0745  Gross per 24 hour   Intake 710 ml   Output --   Net 710 ml       Hemodynamic Parameters:       Telemetry: NSR       Physical Exam  Constitutional:       Appearance: Normal appearance.   HENT:      Head: Normocephalic.      Mouth/Throat:      Mouth: Mucous membranes are moist.   Eyes:      Pupils: Pupils are equal, round, and reactive to light.   Cardiovascular:      Rate and Rhythm: Normal rate and regular rhythm.      Heart sounds: No murmur heard.  Pulmonary:      Effort: Pulmonary effort is normal.   Chest:      Chest wall: No tenderness.   Abdominal:      General: Abdomen is flat.      Palpations: Abdomen is soft.   Musculoskeletal:      Right lower leg: No edema.      Left lower leg: No edema.   Neurological:      Mental Status: She is alert.            Significant Labs:  CBC:  Recent Labs   Lab 05/04/25  1018 05/05/25  0652 05/06/25  0749   WBC 3.58* 3.90 4.71   RBC 2.73* 3.03* 3.07*   HGB 7.9* 8.7* 8.8*   HCT 24.4* 27.0* 27.0*    206 189   MCV 89 89 88   MCH 28.9 28.7 28.7   MCHC 32.4 32.2 32.6     BNP:  No results for input(s): "BNP" in the last 168 hours.    Invalid input(s): "BNPTRIAGELBLO"  CMP:  Recent Labs   Lab 05/04/25  1018 05/05/25  0652 05/06/25  0749   GLU 94 92 79   CALCIUM 7.8* 7.9* 8.4*   ALBUMIN 2.7* 2.8* 3.0*   PROT 6.7 7.2 7.3   NA " "136 135* 139   K 3.8 4.2 4.0   CO2 20* 22* 22*    104 106   BUN 54* 54* 48*   CREATININE 3.7* 3.3* 2.9*   ALKPHOS 105 140 155*   ALT 54* 49* 43   AST 32 30 30   BILITOT 0.6 0.5 0.5      Coagulation:   No results for input(s): "PT", "INR", "APTT" in the last 168 hours.    LDH:  No results for input(s): "LDH" in the last 72 hours.  Microbiology:  Microbiology Results (last 7 days)       Procedure Component Value Units Date/Time    Blood culture [[id number]]  (Normal) Collected: 04/27/25 0923    Order Status: Completed Specimen: Blood from Peripheral, Antecubital, Right Updated: 05/02/25 1101     Blood Culture No Growth After 5 Days    Blood culture [[id number]]  (Normal) Collected: 04/27/25 0942    Order Status: Completed Specimen: Blood from Peripheral, Antecubital, Left Updated: 05/02/25 1101     Blood Culture No Growth After 5 Days    Urine culture [[id number]]  (Abnormal)  (Susceptibility) Collected: 04/27/25 0845    Order Status: Completed Specimen: Urine, Catheterized Updated: 05/01/25 0222     Urine Culture >100,000 cfu/ml Enterococcus faecalis    Blood culture [[id number]]  (Normal) Collected: 04/25/25 0319    Order Status: Completed Specimen: Blood Updated: 04/30/25 1802     Blood Culture No Growth After 5 Days    Blood culture [[id number]]  (Normal) Collected: 04/25/25 0324    Order Status: Completed Specimen: Blood Updated: 04/30/25 1802     Blood Culture No Growth After 5 Days            I have reviewed all pertinent labs within the past 24 hours.    Estimated Creatinine Clearance: 14.3 mL/min (A) (based on SCr of 2.9 mg/dL (H)).    Diagnostic Results:  I have reviewed and interpreted all pertinent imaging results/findings within the past 24 hours.  Assessment and Plan:     Ms. Damon is a 70 y/o AAF s/p OHTx 5/27/93 at [facility], s/p PPM same date, mild anemia and leukopenia, OA, cervical spine DJD with radiculopathy and chronic neck pain who presents for her 31st post annual transplant " evaluation. Four years ago after her annual was found to have BRCA, underwent excision, chemo/radiation, had a rough time of it, with some complications related to it, however recovered well.      Transferred from [facility] ED due to chest pain. She initially was at the [facility] for pain management procedure for chronic back pain. In preprocedure area complained of nausea and chest pain having taken a sublingual nitro prior to arrival. Reported intermittent chest pain > 1 week. Took all regular medications on an empty stomach and per notes had n/v after starting IV. ECG with repol abnormalities. Noted to be hypokalemic and hypomagnesemic. Troponin I mildly elevated.     Off note, a few months ago was evaluated by  for a reducible ventral hernia containing portion of the liver no bowel noted on exam or imaging. At that time also reported chronic nausea. Elective hernia repair deemed not emergent and if she should require intervention to be done at center with cardiac surgery and Cardiology heart failure services.      Cardiac PET 11/15/2024    The myocardial perfusion images show no evidence of ischemia or scar.    The whole heart absolute myocardial perfusion values were elevated at rest, low normal during stress and CFR is mildly reduced in part due to elevated resting flow.    CT attenuation images demonstrate no coronary calcifications and mild diffuse aortic calcifications in the ascending aorta, in the descending aorta and moderate calcfications in the aortic arch.    The gated perfusion images showed an ejection fraction of 56% at rest and 59% during stress. A normal ejection fraction is greater than 47%.    There is normal wall motion at rest and normal wall motion during stress.    The study's ECG is negative for ischemia.       TTE 07/20/2024:    Left Ventricle: The left ventricle is normal in size. Normal wall thickness. There is normal systolic function with a visually estimated ejection fraction of 55 - 60%.  There is normal diastolic function.    Right Ventricle: Normal right ventricular cavity size. Wall thickness is normal. Systolic function is normal.    Left Atrium: Patent foramen ovale visualized with predominant right to left shunting indicated by saline contrast.    Tricuspid Valve: There is mild regurgitation.    IVC/SVC: Normal venous pressure at 3 mmHg.    The patient is status post cardiac transplantation.  PFO not present with bubble alone, but with valsalva had very small bubbles come across         Cxr: Comparison is made to January 4, 2023.  Electrical lead overlies the lower chest and upper abdomen.  Surgical clips overlie the upper abdomen.  Mediastinal silhouette is prominent.  The lungs are clear.  No pneumothorax or significant pleural effusion     DSE 05/24/21:  The left ventricle is normal in size with concentric remodeling and normal systolic function.  The patient reached the end of the protocol.  There were no arrhythmias during stress.  Severe left atrial enlargement.  The estimated ejection fraction is 55%.  Grade II left ventricular diastolic dysfunction.  Normal right ventricular size with normal right ventricular systolic function.  Mild-to-moderate mitral regurgitation.  Mild to moderate tricuspid regurgitation.  Normal central venous pressure (3 mmHg).  The estimated PA systolic pressure is 39 mmHg.  The stress echo portion of this study is negative for myocardial ischemia.  Severe left atrial enlargement.  The ECG portion of this study is negative for myocardial ischemia.    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   -Last PET stress 11/2024  negative for ischemia  - Both TTE and AYAAN done.   -2D echo 4/28/25 EF: 45%, global hypokinesis, diastolic dysfunction.  RV function moderately reduced.  Mild to Mod TR. LVEDD: 4cm  - not currently  candidate for angiogram given renal dysfunction  -ASA  -neg PET stress in 11/2024, given this and troponin only mildly elevated, no need for LHC for eval    Hypermagnesemia  Stop Maalox  Will monitor    GRCAE (acute kidney injury)  Patient with history of CKD, now GRACE on CKD due to ATN after cardiac arrest.   Baseline creatinine around 2.6. Last creatinine 3.8    Plan:   - Will give fluids, 500 ml   - Avoid nephrotoxins   - Daily BMP   -outpatient follow up with nephrology for arrangement for dialysis     CKD (chronic kidney disease), stage IV  -Baseline creatinine ~3   - GRACE on CKD likely the setting of ATN after cardiac arrest as well as CKD V (per Nephrology its likely due to chronic calcineurin exposure and working for creating AV fistula)      Hypomagnesemia  -replacing Mag and monitor renal function    Hypokalemia  -replacing electrolytes as needed while monitoring renal function    Anemia  Stable, no acute signs of bleeding    Chronic right-sided thoracic back pain  -continue home medications    Chronic idiopathic constipation  - prn laxatives  -scheduled fiber supplementation     Abnormal serum thyroid stimulating hormone (TSH) level  -repeating thyroid panel    Acute cystitis without hematuria  Patient complaining of dysuria. UA positive for urinary infection with high WBC and bacteriuria. Urine cultures positive for E. Faecalis.    Plan:   - Started ampicillin 2 gr daily on 5/1. Course completed.   - zyrtec for itching and mucinex for mucus (Xray chest unchanged)    Chest pain  -Multifactorial now possibly secondary to cardiac arrest   -suspect possible component of CAV as well, unable to perform angio gram given renal function for confirmation     Anemia associated with stage 5 chronic renal failure  -H/H stable  -No signs of bleeding    Immunocompromised state due to drug therapy  S/p heart transplant  On Cyclosporin    Essential hypertension  -Will monitor on current regimen     S/P orthotopic heart  transplant  -Transplant Date [facility] At [facility]   -CMV Status:D?R+   -Rejection status: Moderate Risk  -Last HLA/DSA 7/2/24 week DQ7  -2D echo 4/28/25 EF: 45%, global hypokinesis, diastolic dysfunction.  RV function moderately reduced.  Mild to Mod TR. LVEDD: 4cm  -Immunosuppression: cyclosporine with goal . Dose reduced for renal function         Keri Dyson MD  Heart Transplant  [facility] - Transplant Stepdown

## 2025-05-06 NOTE — PT/OT/SLP PROGRESS
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     Nadia Damon is a 69 y.o. 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  She presents with good participation, but had some chest/heartburn upon changing positions that resolved after a few moments. Performance deficits affecting function are weakness, impaired endurance, impaired functional mobility, gait instability, impaired balance, impaired self care skills.     Rehab Prognosis:  Good; patient would benefit from acute skilled OT services to address these deficits and reach maximum level of function.       Plan:     Patient to be seen 4 x/week to address the above listed problems via self-care/home management, therapeutic activities, therapeutic exercises, neuromuscular re-education  Plan of Care Expires: 05/28/25  Plan of Care Reviewed with: patient    Subjective     Chief Complaint: heartburn  Patient/Family Comments/goals: return home  Pain/Comfort:  Pain Rating 1: 0/10  Location - Side 1: Bilateral  Location - Orientation 1: generalized  Pain Addressed 1: Reposition, Distraction  Pain Addressed 2: Distraction, Reposition    Objective:     Communicated with: Nsg prior to session.  Patient found HOB elevated with blood pressure cuff, pulse ox (continuous), telemetry upon OT entry to room.    General Precautions: Standard, fall    Orthopedic Precautions:N/A  Braces: N/A  Respiratory Status: Room air     Occupational Performance:     Bed Mobility:    Patient completed Scooting/Bridging with  supervision  Patient completed Supine to Sit with supervision     Functional Mobility/Transfers:  Patient completed Sit <> Stand Transfer with supervision  with  straight cane   Chair t/f c/ Sup using cane  Functional Mobility: Pt ambulated room and hallway level with SBA to sup using SPC to simulate safe home and community mobility, and visual scanning needed for ADL and IADL participation     Activities of Daily Living:  Feeding:  supervision drinking water  Upper Body Dressing: independence don gown for backside  Lower Body Dressing: supervision adjust socks      AMPAC 6 Click ADL: 24    Treatment & Education:  Pt educated on role and purpose of therapy  Pt educated on goal setting  Pt educated on benefits of OOB activity  Pt educated on self advocacy      Patient left up in chair with all lines intact, call button in reach, and nsg notified    GOALS:   Multidisciplinary Problems       Occupational Therapy Goals          Problem: Occupational Therapy    Goal Priority Disciplines Outcome Interventions   Occupational Therapy Goal     OT, PT/OT Progressing    Description: Goals to be met by: 5/28/25.     Patient will increase functional independence with ADLs by performing:    UE Dressing with Stand-by Assistance.  LE Dressing with Stand-by Assistance.  Grooming while standing at sink with Stand-by Assistance.  Toileting from toilet with Stand-by Assistance for hygiene and clothing management.   Toilet transfer to toilet with Stand-by Assistance.  Upper extremity exercise program x10 reps per handout, with independence.                         DME Justifications:  No DME recommended requiring DME justifications    Time Tracking:     OT Date of Treatment: 05/06/25  OT Start Time: 1039  OT Stop Time: 1102  OT Total Time (min): 23 min    Billable Minutes:Self Care/Home Management 10  Therapeutic Activity 13    OT/FIORELLA: OT          5/6/2025

## 2025-05-06 NOTE — PROGRESS NOTES
Dc/Follow-up note     Informed by RENETTA Figueroa, with [facility] that pt l/m inquiring about start of care (SOC) for HH services. Informed Carolina that SW will f/u with pt regarding SOC. Attempted to meet with pt in room to inform of above, which pt had dc'd. F/u with pt via phone ([phone]) to inform that SOC will begin tomorrow. Pt informed SW that she received a call from [facility] ([phone]) informing her of SOC and that they will call her in the am with time. Advised pt to f/u with HIMA Curry, at [facility] to assist with aleksandar for LT-PCS. Pt was amenable to above and confirmed she still has LT-PCS brochure. Attempted to call HIMA Curry, ([phone]) and Antonieta, [employer] [facility], with [facility] ([phone], ext. [phone]) to confirm pt's dc, which HIMA received no responses. L/ms for Antoinette and Antonieta informing them of pt's dc today with HH services by [facility]. Reminded Antoinette to assist pt with completion of LTC-PCS aleksandar and informed her that pt has brochure (via v/m). Informed Antonieta that pt's insurance ([employer]) denied SNF and requested that she assists pt with case mgmt services (via v/m). Also, f/u with pt's son Moy, ([phone]) to inform of pt's dc today with HH services. Informed pt's son that pt's insurance denied SNF. Pt's son verbalized concerns with pt's dc home alone. Informed pt's son that pt was medically stable for dc. Empathetic listening and general support provided. Informed pt's son that HH will work with pt to regain her independence and assess home environment for safety. Informed pt's son that HH staff can assist with resources/referrals if they deem pt is unsafe to reside alone. Informed pt's son that HIMA l/ms for HIMA Curry, at [facility] requesting that she assist pt with LT-PCS and Antonieta with [employer] to assist with case mgmt services. Explored 24/7 sitters with pt's son as an option until LT-PCS aleksandar was completed. Pt's son stated that he asked pt to move with him in [place], which pt  Melida will help to schedule your D&C.  Call if you have any questions or concerns prior to that.   refused. Advised pt's son to discuss above with pt again and express his concerns for her safety.

## 2025-05-07 ENCOUNTER — HOSPITAL ENCOUNTER (EMERGENCY)
Facility: HOSPITAL | Age: 70
Discharge: HOME | End: 2025-05-07
Attending: EMERGENCY MEDICINE
Payer: MEDICARE

## 2025-05-07 ENCOUNTER — PATIENT OUTREACH (OUTPATIENT)
Dept: ADMINISTRATIVE | Facility: CLINIC | Age: 70
End: 2025-05-07
Payer: MEDICARE

## 2025-05-07 ENCOUNTER — E-CONSULT (OUTPATIENT)
Dept: TRANSPLANT | Facility: HOSPITAL | Age: 70
End: 2025-05-07
Payer: MEDICARE

## 2025-05-07 VITALS
HEIGHT: 62 IN | HEART RATE: 87 BPM | TEMPERATURE: 98 F | SYSTOLIC BLOOD PRESSURE: 148 MMHG | RESPIRATION RATE: 17 BRPM | WEIGHT: 131 LBS | OXYGEN SATURATION: 100 % | DIASTOLIC BLOOD PRESSURE: 79 MMHG | BODY MASS INDEX: 24.11 KG/M2

## 2025-05-07 DIAGNOSIS — D64.9 CHRONIC ANEMIA: ICD-10-CM

## 2025-05-07 DIAGNOSIS — Z94.1 HEART TRANSPLANTED: ICD-10-CM

## 2025-05-07 DIAGNOSIS — R07.89 LEFT-SIDED CHEST WALL PAIN: ICD-10-CM

## 2025-05-07 DIAGNOSIS — R07.89 ATYPICAL CHEST PAIN: Primary | ICD-10-CM

## 2025-05-07 DIAGNOSIS — R07.9 CHEST PAIN: ICD-10-CM

## 2025-05-07 DIAGNOSIS — N18.4 CKD (CHRONIC KIDNEY DISEASE) STAGE 4, GFR 15-29 ML/MIN: ICD-10-CM

## 2025-05-07 DIAGNOSIS — R07.9 CHEST PAIN, UNSPECIFIED TYPE: Primary | ICD-10-CM

## 2025-05-07 DIAGNOSIS — R07.82 INTERCOSTAL PAIN: ICD-10-CM

## 2025-05-07 DIAGNOSIS — Z94.1 HISTORY OF HEART TRANSPLANT: ICD-10-CM

## 2025-05-07 DIAGNOSIS — I10 CHRONIC HYPERTENSION: ICD-10-CM

## 2025-05-07 LAB
ABSOLUTE EOSINOPHIL (OHS): 0.09 K/UL
ABSOLUTE MONOCYTE (OHS): 0.67 K/UL (ref 0.3–1)
ABSOLUTE NEUTROPHIL COUNT (OHS): 5.05 K/UL (ref 1.8–7.7)
ALBUMIN SERPL BCP-MCNC: 3.1 G/DL (ref 3.5–5.2)
ALP SERPL-CCNC: 189 UNIT/L (ref 40–150)
ALT SERPL W/O P-5'-P-CCNC: 39 UNIT/L (ref 10–44)
ANION GAP (OHS): 11 MMOL/L (ref 8–16)
APTT PPP: 27.6 SECONDS (ref 21–32)
AST SERPL-CCNC: 37 UNIT/L (ref 11–45)
BASOPHILS # BLD AUTO: 0.01 K/UL
BASOPHILS NFR BLD AUTO: 0.1 %
BILIRUB SERPL-MCNC: 0.4 MG/DL (ref 0.1–1)
BNP SERPL-MCNC: 611 PG/ML (ref 0–99)
BUN SERPL-MCNC: 40 MG/DL (ref 8–23)
CALCIUM SERPL-MCNC: 8.6 MG/DL (ref 8.7–10.5)
CHLORIDE SERPL-SCNC: 109 MMOL/L (ref 95–110)
CO2 SERPL-SCNC: 20 MMOL/L (ref 23–29)
CREAT SERPL-MCNC: 2.9 MG/DL (ref 0.5–1.4)
ERYTHROCYTE [DISTWIDTH] IN BLOOD BY AUTOMATED COUNT: 15.7 % (ref 11.5–14.5)
GFR SERPLBLD CREATININE-BSD FMLA CKD-EPI: 17 ML/MIN/1.73/M2
GLUCOSE SERPL-MCNC: 91 MG/DL (ref 70–110)
HCT VFR BLD AUTO: 25.9 % (ref 37–48.5)
HGB BLD-MCNC: 8.6 GM/DL (ref 12–16)
IMM GRANULOCYTES # BLD AUTO: 0.08 K/UL (ref 0–0.04)
IMM GRANULOCYTES NFR BLD AUTO: 1.2 % (ref 0–0.5)
INR PPP: 0.9 (ref 0.8–1.2)
LYMPHOCYTES # BLD AUTO: 0.83 K/UL (ref 1–4.8)
MCH RBC QN AUTO: 29.5 PG (ref 27–31)
MCHC RBC AUTO-ENTMCNC: 33.2 G/DL (ref 32–36)
MCV RBC AUTO: 89 FL (ref 82–98)
NUCLEATED RBC (/100WBC) (OHS): 0 /100 WBC
PLATELET # BLD AUTO: 207 K/UL (ref 150–450)
PMV BLD AUTO: 9.9 FL (ref 9.2–12.9)
POTASSIUM SERPL-SCNC: 4.5 MMOL/L (ref 3.5–5.1)
PROT SERPL-MCNC: 7.9 GM/DL (ref 6–8.4)
PROTHROMBIN TIME: 10.7 SECONDS (ref 9–12.5)
RBC # BLD AUTO: 2.92 M/UL (ref 4–5.4)
RELATIVE EOSINOPHIL (OHS): 1.3 %
RELATIVE LYMPHOCYTE (OHS): 12.3 % (ref 18–48)
RELATIVE MONOCYTE (OHS): 10 % (ref 4–15)
RELATIVE NEUTROPHIL (OHS): 75.1 % (ref 38–73)
SODIUM SERPL-SCNC: 140 MMOL/L (ref 136–145)
TROPONIN I SERPL DL<=0.01 NG/ML-MCNC: 0.21 NG/ML
TROPONIN I SERPL DL<=0.01 NG/ML-MCNC: 0.72 NG/ML
WBC # BLD AUTO: 6.73 K/UL (ref 3.9–12.7)

## 2025-05-07 PROCEDURE — 84484 ASSAY OF TROPONIN QUANT: CPT | Mod: HCNC | Performed by: EMERGENCY MEDICINE

## 2025-05-07 PROCEDURE — 25000003 PHARM REV CODE 250: Mod: HCNC | Performed by: EMERGENCY MEDICINE

## 2025-05-07 PROCEDURE — 80053 COMPREHEN METABOLIC PANEL: CPT | Mod: HCNC | Performed by: EMERGENCY MEDICINE

## 2025-05-07 PROCEDURE — 85025 COMPLETE CBC W/AUTO DIFF WBC: CPT | Mod: HCNC | Performed by: EMERGENCY MEDICINE

## 2025-05-07 PROCEDURE — 93010 ELECTROCARDIOGRAM REPORT: CPT | Mod: HCNC,,, | Performed by: INTERNAL MEDICINE

## 2025-05-07 PROCEDURE — 63600175 PHARM REV CODE 636 W HCPCS: Mod: HCNC | Performed by: EMERGENCY MEDICINE

## 2025-05-07 PROCEDURE — 96374 THER/PROPH/DIAG INJ IV PUSH: CPT | Mod: HCNC

## 2025-05-07 PROCEDURE — 83880 ASSAY OF NATRIURETIC PEPTIDE: CPT | Mod: HCNC | Performed by: EMERGENCY MEDICINE

## 2025-05-07 PROCEDURE — 85610 PROTHROMBIN TIME: CPT | Mod: HCNC | Performed by: EMERGENCY MEDICINE

## 2025-05-07 PROCEDURE — 85730 THROMBOPLASTIN TIME PARTIAL: CPT | Mod: HCNC | Performed by: EMERGENCY MEDICINE

## 2025-05-07 PROCEDURE — 93005 ELECTROCARDIOGRAM TRACING: CPT | Mod: HCNC

## 2025-05-07 PROCEDURE — 99285 EMERGENCY DEPT VISIT HI MDM: CPT | Mod: 25,HCNC

## 2025-05-07 RX ORDER — NIFEDIPINE 30 MG/1
60 TABLET, EXTENDED RELEASE ORAL ONCE
Status: COMPLETED | OUTPATIENT
Start: 2025-05-07 | End: 2025-05-07

## 2025-05-07 RX ORDER — OXYCODONE HYDROCHLORIDE 5 MG/1
5 TABLET ORAL
Refills: 0 | Status: COMPLETED | OUTPATIENT
Start: 2025-05-07 | End: 2025-05-07

## 2025-05-07 RX ORDER — ASPIRIN 325 MG
325 TABLET ORAL
Status: COMPLETED | OUTPATIENT
Start: 2025-05-07 | End: 2025-05-07

## 2025-05-07 RX ORDER — FUROSEMIDE 10 MG/ML
40 INJECTION INTRAMUSCULAR; INTRAVENOUS
Status: COMPLETED | OUTPATIENT
Start: 2025-05-07 | End: 2025-05-07

## 2025-05-07 RX ORDER — CARVEDILOL 3.12 MG/1
3.12 TABLET ORAL ONCE
Status: COMPLETED | OUTPATIENT
Start: 2025-05-07 | End: 2025-05-07

## 2025-05-07 RX ADMIN — OXYCODONE HYDROCHLORIDE 5 MG: 5 TABLET ORAL at 10:05

## 2025-05-07 RX ADMIN — CARVEDILOL 3.12 MG: 3.12 TABLET, FILM COATED ORAL at 10:05

## 2025-05-07 RX ADMIN — FUROSEMIDE 40 MG: 10 INJECTION, SOLUTION INTRAMUSCULAR; INTRAVENOUS at 11:05

## 2025-05-07 RX ADMIN — NIFEDIPINE 60 MG: 30 TABLET, FILM COATED, EXTENDED RELEASE ORAL at 10:05

## 2025-05-07 RX ADMIN — ASPIRIN 325 MG ORAL TABLET 325 MG: 325 PILL ORAL at 11:05

## 2025-05-07 NOTE — PROGRESS NOTES
TCC call not required and not applicable due to pt's admission diagnosis of chest pain is related to the pts transplanted heart. No messages routed at this time.

## 2025-05-07 NOTE — ED PROVIDER NOTES
SCRIBE #1 NOTE: I, Jolynn Baltazar, am scribing for, and in the presence of, Leana Deshpande MD. I have scribed the entire note.       History     Chief Complaint   Patient presents with    Chest Pain     Recent discharge for Cardiac arrest at [facility], pt presents to ED for chest pain that worsens with movement       Review of patient's allergies indicates:   Allergen Reactions    Dobutamine Other (See Comments)     Severe Left sided chest pain after dosage administered for Dobutamine Stress Test; itching    Lisinopril Swelling and Rash    Hydrocodone-acetaminophen Nausea Only    Augmentin [amoxicillin-pot clavulanate] Diarrhea    Zyvox [linezolid] Nausea And Vomiting         History of Present Illness     HPI    5/7/2025, 10:56 AM  History obtained from the patient and medical records      History of Present Illness: Nadia Damon is a 69 y.o. female patient with a PMHx of HTN, heart transplant (1993), depression, anxiety, shingles, anemia, heart failure, CKD, CAD, breast cancer, cellulitis, and sepsis who presents to the Emergency Department for evaluation of L sided CP which began this morning while moving around. Pt notes that she was recently transferred to the ICU at [facility] for CP due to her hx of heart transplant; she reports that while she was there she went into cardiac arrest. Pt was just discharged yesterday. She notes that she has some residual soreness from the compressions, but states that the L sided chest pain is worse with movement. She was prescribed hydrocodone which she took last night and had relief, did not take anything this orning.. Symptoms are constant and moderate in severity. Patient denies any n/v, diaphoresis, SOB, or fever at this time. No prior Tx specified.  No further complaints or concerns at this time.       Arrival mode: Ambulance Service    PCP: Elis Wick MD        Past Medical History:  Past Medical History:   Diagnosis Date    Abdominal wall hernia     CT Renal  6/11/2018---Small fat containing superior ventral abdominal wall hernia at the epicardial pacing lead site.    Abnormal mammogram 10/12/2021    Acute cystitis without hematuria 05/10/2022    Acute ischemic right middle cerebral artery (MCA) stroke 07/20/2024    Adverse drug reaction 11/12/2024    GRACE (acute kidney injury) 11/22/2021    Anxiety     Aphasia 07/19/2024    Arthritis     ZEN HIPS    Breast cancer in female 08/2021    LEFT BREAST    Breast mass, right 11/13/2024    RIGHT BREAST MASS (Problem)      Bronchitis 08/18/2016    Never smoked      C. difficile colitis 11/29/2021    Cellulitis of axilla, left 12/23/2021    Chronic diastolic heart failure 12/16/2021    Chronic kidney disease     stage 4, GFR 15-29 ml/min    Chronic midline low back pain without sciatica 06/18/2018    CKD (chronic kidney disease) stage 4, GFR 15-29 ml/min 04/20/2016    US Retro   5/16/2018---Mild cortical thinning and increased cortical echogenicity.  Findings can be seen with medical renal disease.  8/31/216--- Echogenic kidneys with diffuse cortical thinning suggesting  medical renal disease. 2 complex right renal cortical cysts.      Closed nondisplaced fracture of distal phalanx of left great toe with routine healing 10/22/2018    Coronary artery disease 1993    heart transplant    COVID-19 in immunocompromised patient 02/26/2024    Cystitis 05/10/2022    Depression     Encounter for blood transfusion     Encounter for palliative care 10/14/2024    Fibromyalgia     on Lyrica    Heart failure     native heart cardiomyopathy    Heart transplanted 1993    due to cardiomyopathy    History of hyperparathyroidism; Hyperparathyroidism, secondary renal     PT DENIES    Hypertension     Immune disorder     anti rejection meds    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    Impaired mobility 07/28/2022    Iron deficiency anemia 08/15/2017    Kidney stones     passed per pt    Obesity     Obesity (BMI 30.0-34.9) 07/22/2019     Other osteoporosis without current pathological fracture 08/30/2019    Other pancytopenia 05/06/2024    Parotitis, acute 09/19/2023    Pharyngitis 01/09/2019    Pneumonia due to infectious organism 03/14/2024    PONV (postoperative nausea and vomiting)     Severe sepsis 11/22/2021    Shingles 2003 approx    left leg    Stage 4 chronic kidney disease 11/22/2021    Subclinical hypothyroidism 06/16/2023    Thrombocytopenia, unspecified 11/29/2021    Trouble in sleeping     Unresponsiveness 11/13/2024    Urinary incontinence        Past Surgical History:  Past Surgical History:   Procedure Laterality Date    bladder implant Right 06/11/2024    BLADDER SURGERY  2015 approx    mesh - Dr Everett then 2nd reconstructive sx Dr Onofre    BREAST BIOPSY Bilateral     NEGATIVE    BREAST BIOPSY Right 10/31/2022    benign    BREAST LUMPECTOMY Left 2021    BREAST SURGERY Left 09/28/2015    Bx - benign    BREAST SURGERY Right 12/2015    Bx benign    CARDIAC PACEMAKER REMOVAL Left 06/26/2014    Pacer defirillator removed. Put in 1993 aat time of heart transplant    CARPAL TUNNEL RELEASE Left 03/03/2015    Dr. Hall    COLONOSCOPY N/A 02/25/2021    Procedure: COLONOSCOPY;  Surgeon: Freida Ramirez MD;  Location: [facility];  Service: Endoscopy;  Laterality: N/A;    CYSTOCELE REPAIR      Twice with mesh removal    ECHOCARDIOGRAM,TRANSESOPHAGEAL N/A 4/26/2025    Procedure: Transesophageal echo (AYAAN) intra-procedure log documentation;  Surgeon: Barron Chinchilla MD;  Location: [facility] OR [address] [place] [address];  Service: Cardiothoracic;  Laterality: N/A;    EPIDURAL STEROID INJECTION INTO CERVICAL SPINE N/A 02/02/2023    Procedure: T11/T12 IL HELLEN;  Surgeon: Jassi Pierre MD;  Location: [facility] PAIN MGT;  Service: Pain Management;  Laterality: N/A;    EPIDURAL STEROID INJECTION INTO CERVICAL SPINE N/A 9/16/2024    Procedure: T11/T12 IL HELLEN;  Surgeon: Jassi Pierre MD;  Location: [facility] PAIN MGT;  Service: Pain Management;  Laterality: N/A;     HEART TRANSPLANT  1993    HERNIA REPAIR Right 1971 approx    Inguinal    HYSTERECTOMY  1983    vag hyst /LSO     IMPLANTATION OF PERMANENT SACRAL NERVE STIMULATOR N/A 06/11/2024    Procedure: INSERTION, NEUROSTIMULATOR, PERMANENT, SACRAL;  Surgeon: Sami Cochran MD;  Location: [facility] OR;  Service: Urology;  Laterality: N/A;    INCISION AND DRAINAGE OF ABSCESS Left 12/24/2021    Procedure: INCISION AND DRAINAGE, ABSCESS;  Surgeon: Joseph Longo MD;  Location: [facility] OR;  Service: General;  Laterality: Left;    INJECTION OF ANESTHETIC AGENT AROUND MEDIAL BRANCH NERVES INNERVATING LUMBAR FACET JOINT Right 10/19/2022    Procedure: Right L4/L5 and L5/S1 MBB;  Surgeon: Jassi Pierre MD;  Location: [facility] PAIN MGT;  Service: Pain Management;  Laterality: Right;    INJECTION OF ANESTHETIC AGENT AROUND MEDIAL BRANCH NERVES INNERVATING LUMBAR FACET JOINT Right 11/09/2022    Procedure: Right L4/L5 and L5/S1 MBB;  Surgeon: Jassi Pierre MD;  Location: [facility] PAIN MGT;  Service: Pain Management;  Laterality: Right;    INJECTION OF ANESTHETIC AGENT AROUND MEDIAL BRANCH NERVES INNERVATING LUMBAR FACET JOINT Left 2/6/2025    Procedure: Left L4-5 and L5-S1 MBB #1 with local;  Surgeon: Jassi Pierre MD;  Location: [facility] PAIN MGT;  Service: Pain Management;  Laterality: Left;    INJECTION OF ANESTHETIC AGENT AROUND MEDIAL BRANCH NERVES INNERVATING LUMBAR FACET JOINT Left 3/19/2025    Procedure: Left L4-5 and L5-S1 MBB #2 with local;  Surgeon: Jassi Pierre MD;  Location: [facility] PAIN MGT;  Service: Pain Management;  Laterality: Left;    INJECTION OF ANESTHETIC AGENT INTO SACROILIAC JOINT Right 08/22/2022    Procedure: Right SIJ Injection Right L5/S1 Facte Injection;  Surgeon: Jassi Pierre MD;  Location: [facility] PAIN MGT;  Service: Pain Management;  Laterality: Right;    INSERTION OF TUNNELED CENTRAL VENOUS CATHETER (CVC) WITH SUBCUTANEOUS PORT N/A 11/09/2021    Procedure: [id number];  Surgeon: Christoph YANCEY  MD Misael;  Location: [facility] OR;  Service: General;  Laterality: N/A;    RADIOFREQUENCY ABLATION Right 12/5/2024    Procedure: Right L4-5 and L5-S1 RFA;  Surgeon: Jassi Peirre MD;  Location: [facility] PAIN MGT;  Service: Pain Management;  Laterality: Right;    RADIOFREQUENCY THERMOCOAGULATION Right 12/07/2022    Procedure: Right L4/L5 and L5/S1 Lumbar RFA;  Surgeon: Jassi Pierre MD;  Location: [facility] PAIN MGT;  Service: Pain Management;  Laterality: Right;    REMOVAL OF ELECTRODE LEAD OF SACRAL NERVE STIMULATOR N/A 2/18/2025    Procedure: REMOVAL, ELECTRODE LEAD, SACRAL NERVE STIMULATOR;  Surgeon: Sami Cochran MD;  Location: [facility] OR;  Service: Urology;  Laterality: N/A;    REMOVAL OF VASCULAR ACCESS PORT      ROBOT-ASSISTED LAPAROSCOPIC ABDOMINAL SACROCOLPOPEXY N/A 08/10/2023    Procedure: ROBOTIC SACROCOLPOPEXY, ABDOMEN;  Surgeon: PRANAY Villalobos MD;  Location: [facility] OR;  Service: OB/GYN;  Laterality: N/A;    ROBOT-ASSISTED LAPAROSCOPIC OOPHORECTOMY Right 08/10/2023    Procedure: ROBOTIC OOPHORECTOMY;  Surgeon: PRANAY Villalobos MD;  Location: [facility] OR;  Service: OB/GYN;  Laterality: Right;    SENTINEL LYMPH NODE BIOPSY Left 10/12/2021    Procedure: BIOPSY, LYMPH NODE, SENTINEL;  Surgeon: Christoph Douglas MD;  Location: [facility] OR;  Service: General;  Laterality: Left;    TOE SURGERY      XI ROBOTIC URETHROPEXY N/A 08/10/2023    Procedure: XI ROBOTIC URETHROPEXY;  Surgeon: PRANAY Villalobos MD;  Location: [facility] OR;  Service: OB/GYN;  Laterality: N/A;         Family History:  Family History   Problem Relation Name Age of Onset    Cancer Mother  38        breast    Breast cancer Mother      Breast cancer Maternal Grandmother      Heart disease Maternal Grandmother      Hypertension Son      Cataracts Cousin      Diabetes Neg Hx      Stroke Neg Hx      Kidney disease Neg Hx      Asthma Neg Hx      COPD Neg Hx      Melanoma Neg Hx      Hyperlipidemia Neg Hx         Social History:  Social History      Tobacco Use    Smoking status: Never     Passive exposure: Never    Smokeless tobacco: Never   Substance and Sexual Activity    Alcohol use: Never     Alcohol/week: 0.0 standard drinks of alcohol    Drug use: No    Sexual activity: Not Currently     Partners: Male     Birth control/protection: See Surgical Hx        Review of Systems     Review of Systems   Constitutional:  Negative for diaphoresis and fever.   HENT:  Negative for sore throat.    Respiratory:  Negative for shortness of breath.    Cardiovascular:  Positive for chest pain (soreness from compressions; pounding L sided CP that onset with movement this AM).   Gastrointestinal:  Negative for nausea and vomiting.   Genitourinary:  Negative for dysuria.   Musculoskeletal:  Negative for back pain.   Skin:  Negative for rash.   Neurological:  Negative for weakness and light-headedness.   Hematological:  Does not bruise/bleed easily.   All other systems reviewed and are negative.     Physical Exam     Initial Vitals [05/07/25 0924]   BP Pulse Resp Temp SpO2   (!) 175/127 104 18 98.3 °F (36.8 °C) 98 %      MAP       --          Physical Exam  Nursing Notes and Vital Signs Reviewed.  Constitutional: Patient is in no acute distress. Well-developed and well-nourished.  Head: Atraumatic. Normocephalic.  Eyes: PERRL. EOM intact. Conjunctivae are not pale. No scleral icterus.  ENT: Mucous membranes are moist. Oropharynx is clear and symmetric.    Neck: Supple. Full ROM. No lymphadenopathy.  Cardiovascular: Tachycardiac. Regular rhythm. No murmurs, rubs, or gallops. Distal pulses are 2+ and symmetric. Reproducible L anterior chest wall tenderness.No bruising or contusions.   Pulmonary/Chest: No respiratory distress. Clear to auscultation bilaterally. No wheezing or rales.   Abdominal: Soft and non-distended. There is no tenderness.  No rebound, guarding, or rigidity. Good bowel sounds.  Genitourinary: No CVA tenderness.  Musculoskeletal: Moves all extremities. No  "obvious deformities. No edema. No calf tenderness.   Skin: Warm and dry.  Neurological:  Alert, awake, and appropriate.  Normal speech.  No acute focal neurological deficits are appreciated.  Psychiatric: Normal affect. Good eye contact. Appropriate in content.      ED Course   Critical Care    Date/Time: 5/7/2025 2:45 PM    Performed by: Leana Deshpande MD  Authorized by: Leana Deshpande MD  Direct patient critical care time: 45 minutes  Additional history critical care time: 8 minutes  Ordering / reviewing critical care time: 7 minutes  Documentation critical care time: 7 minutes  Consulting other physicians critical care time: 7 minutes  Total critical care time (exclusive of procedural time) : 74 minutes  Critical care was necessary to treat or prevent imminent or life-threatening deterioration of the following conditions: cardiac failure and renal failure.  Critical care was time spent personally by me on the following activities: blood draw for specimens, development of treatment plan with patient or surrogate, discussions with consultants, interpretation of cardiac output measurements, evaluation of patient's response to treatment, examination of patient, obtaining history from patient or surrogate, ordering and performing treatments and interventions, ordering and review of laboratory studies, ordering and review of radiographic studies, re-evaluation of patient's condition, review of old charts and pulse oximetry.        ED Vital Signs:  Vitals:    05/07/25 0924 05/07/25 0940 05/07/25 1046 05/07/25 1108   BP: (!) 175/127 (!) 164/82 (!) 154/81    Pulse: 104 107 97 95   Resp: 18 16 16    Temp: 98.3 °F (36.8 °C)      TempSrc: Oral      SpO2: 98% 98%     Weight: 59.4 kg (131 lb)      Height: 5' 2" (1.575 m)       05/07/25 1145 05/07/25 1220 05/07/25 1245 05/07/25 1505   BP: (!) 160/82 (!) 155/86 (!) 144/81 (!) 148/79   Pulse: 91 88 83 87   Resp: 19 20 16 17   Temp:       TempSrc:       SpO2: 100% 99% 97% " 100%   Weight:       Height:           Abnormal Lab Results:  Labs Reviewed   COMPREHENSIVE METABOLIC PANEL - Abnormal       Result Value    Sodium 140      Potassium 4.5      Chloride 109      CO2 20 (*)     Glucose 91      BUN 40 (*)     Creatinine 2.9 (*)     Calcium 8.6 (*)     Protein Total 7.9      Albumin 3.1 (*)     Bilirubin Total 0.4       (*)     AST 37      ALT 39      Anion Gap 11      eGFR 17 (*)    TROPONIN I - Abnormal    Troponin-I 0.207 (*)    B-TYPE NATRIURETIC PEPTIDE - Abnormal     (*)    CBC WITH DIFFERENTIAL - Abnormal    WBC 6.73      RBC 2.92 (*)     HGB 8.6 (*)     HCT 25.9 (*)     MCV 89      MCH 29.5      MCHC 33.2      RDW 15.7 (*)     Platelet Count 207      MPV 9.9      Nucleated RBC 0      Neut % 75.1 (*)     Lymph % 12.3 (*)     Mono % 10.0      Eos % 1.3      Basophil % 0.1      Imm Grans % 1.2 (*)     Neut # 5.05      Lymph # 0.83 (*)     Mono # 0.67      Eos # 0.09      Baso # 0.01      Imm Grans # 0.08 (*)    TROPONIN I - Abnormal    Troponin-I 0.716 (*)    PROTIME-INR - Normal    PT 10.7      INR 0.9     APTT - Normal    PTT 27.6     CBC W/ AUTO DIFFERENTIAL    Narrative:     The following orders were created for panel order CBC auto differential.  Procedure                               Abnormality         Status                     ---------                               -----------         ------                     CBC with Differential[[id number]]       Abnormal            Final result                 Please view results for these tests on the individual orders.        All Lab Results:  Results for orders placed or performed during the hospital encounter of 05/07/25   Comprehensive metabolic panel    Collection Time: 05/07/25 10:08 AM   Result Value Ref Range    Sodium 140 136 - 145 mmol/L    Potassium 4.5 3.5 - 5.1 mmol/L    Chloride 109 95 - 110 mmol/L    CO2 20 (L) 23 - 29 mmol/L    Glucose 91 70 - 110 mg/dL    BUN 40 (H) 8 - 23 mg/dL    Creatinine 2.9 (H)  0.5 - 1.4 mg/dL    Calcium 8.6 (L) 8.7 - 10.5 mg/dL    Protein Total 7.9 6.0 - 8.4 gm/dL    Albumin 3.1 (L) 3.5 - 5.2 g/dL    Bilirubin Total 0.4 0.1 - 1.0 mg/dL     (H) 40 - 150 unit/L    AST 37 11 - 45 unit/L    ALT 39 10 - 44 unit/L    Anion Gap 11 8 - 16 mmol/L    eGFR 17 (L) >60 mL/min/1.73/m2   Troponin I #1    Collection Time: 05/07/25 10:08 AM   Result Value Ref Range    Troponin-I 0.207 (H) <=0.026 ng/mL   BNP    Collection Time: 05/07/25 10:08 AM   Result Value Ref Range     (H) 0 - 99 pg/mL   Protime-INR    Collection Time: 05/07/25 10:08 AM   Result Value Ref Range    PT 10.7 9.0 - 12.5 seconds    INR 0.9 0.8 - 1.2   APTT    Collection Time: 05/07/25 10:08 AM   Result Value Ref Range    PTT 27.6 21.0 - 32.0 seconds   CBC with Differential    Collection Time: 05/07/25 10:08 AM   Result Value Ref Range    WBC 6.73 3.90 - 12.70 K/uL    RBC 2.92 (L) 4.00 - 5.40 M/uL    HGB 8.6 (L) 12.0 - 16.0 gm/dL    HCT 25.9 (L) 37.0 - 48.5 %    MCV 89 82 - 98 fL    MCH 29.5 27.0 - 31.0 pg    MCHC 33.2 32.0 - 36.0 g/dL    RDW 15.7 (H) 11.5 - 14.5 %    Platelet Count 207 150 - 450 K/uL    MPV 9.9 9.2 - 12.9 fL    Nucleated RBC 0 <=0 /100 WBC    Neut % 75.1 (H) 38 - 73 %    Lymph % 12.3 (L) 18 - 48 %    Mono % 10.0 4 - 15 %    Eos % 1.3 <=8 %    Basophil % 0.1 <=1.9 %    Imm Grans % 1.2 (H) 0.0 - 0.5 %    Neut # 5.05 1.8 - 7.7 K/uL    Lymph # 0.83 (L) 1 - 4.8 K/uL    Mono # 0.67 0.3 - 1 K/uL    Eos # 0.09 <=0.5 K/uL    Baso # 0.01 <=0.2 K/uL    Imm Grans # 0.08 (H) 0.00 - 0.04 K/uL   EKG 12-lead    Collection Time: 05/07/25 10:16 AM   Result Value Ref Range    QRS Duration 148 ms    [facility] QTC Calculation 489 ms   Troponin I #2    Collection Time: 05/07/25  1:19 PM   Result Value Ref Range    Troponin-I 0.716 (H) <=0.026 ng/mL     *Note: Due to a large number of results and/or encounters for the requested time period, some results have not been displayed. A complete set of results can be found in Results Review.        Imaging Results:  Imaging Results              X-Ray Chest AP Portable (Final result)  Result time 05/07/25 10:48:04      Final result by Gunner Rao MD (05/07/25 10:48:04)                   Impression:      Stable chest x-ray with no acute abnormality.      Electronically signed by: Gunner Rao MD  Date:    05/07/2025  Time:    10:48               Narrative:    EXAMINATION:  XR CHEST AP PORTABLE    CLINICAL HISTORY:  Acute chest pain, Chest Pain;    COMPARISON:  05/03/2025 chest x-ray    FINDINGS:  Sternal wires are present.  Epicardial lead is again noted.  Cardiac size is enlarged.  Chronic left pericardial scarring is noted.    No acute abnormality is seen.    Left axillary surgical clips.                                       The EKG was ordered, reviewed, and independently interpreted by the ED provider.  Interpretation time: 10:16  Rate: 111 BPM  Rhythm: sinus tachycardia  Interpretation: Possible Left atrial enlargement. Right bundle branch block. Left anterior fascicular block. Bifascicular block. LVH with repolarization abnormality ( R in aVL ). No STEMI.           The Emergency Provider reviewed the vital signs and test results, which are outlined above.     ED Discussion     2:35 PM: Discussed pt's case with Dr. Mendez (Heart Failure and Transplant Cardiology) at [facility];  patients lab work, case discussed at length, feel that patient can be safely discharged and will follow up patient closely.   2:39 PM: Reassessed pt at this time. Discussed with patient and/or family/caretaker all pertinent ED information and results. Discussed pt dx and plan of tx. Gave the patient all f/u and return to the ED instructions. All questions and concerns were addressed at this time. Patient and/or family/caretaker expresses understanding of information and instructions, and is comfortable with plan to discharge. Pt is stable for discharge.     I discussed with patient and/or family/caretaker that  evaluation in the ED does not suggest any emergent or life threatening medical conditions requiring immediate intervention beyond what was provided in the ED, and I believe patient is safe for discharge.  Regardless, an unremarkable evaluation in the ED does not preclude the development or presence of a serious of life threatening condition. As such, I instructed that the patient is to return immediately for any worsening or change in current symptoms.         Medical Decision Making  DDX: 1. Chest Wall Pain 2. HTN Emergency vs urgency 3. ACS    ECG sinus tachycardia, has chronic bifascicular block, CXR normal, lab work reviewed and wbc normal, h/h stable, kidney function at baseline, troponin mildly elevated however no recent for true comparison, does not appear to have had enzymes drawn while recently admitted at [facility], troponin repeated and trended slightly upward, BNP elevated but no overt signs of CHF, patient given her normally prescribed coreg, nifediine, aspirin and lasix, her vital signs are stable, heart rate and BP stable, transfer back to [facility] considered and discussed at length with heart transplant staff overall felt patient could be safely discharged, patient agrees with plan and reasons to return given.     Amount and/or Complexity of Data Reviewed  External Data Reviewed: labs, radiology, ECG and notes.     Details: Reviewed recent admission at [facility]  Labs: ordered. Decision-making details documented in ED Course.  Radiology: ordered. Decision-making details documented in ED Course.  ECG/medicine tests: ordered and independent interpretation performed. Decision-making details documented in ED Course.  Discussion of management or test interpretation with external provider(s): Heart Transplant Services patient with stable ECHO and unremarkable PET Scan, also given kidney fxn did not feel angiogram was warranted due to risk of further damage of kidneys     Risk  OTC  drugs.  Prescription drug management.  Decision regarding hospitalization.  Diagnosis or treatment significantly limited by social determinants of health.                ED Medication(s):  Medications   oxyCODONE immediate release tablet 5 mg (5 mg Oral Given 5/7/25 1046)   carvediloL tablet 3.125 mg (3.125 mg Oral Given 5/7/25 1046)   NIFEdipine 24 hr tablet 60 mg (60 mg Oral Given 5/7/25 1046)   aspirin tablet 325 mg (325 mg Oral Given 5/7/25 1116)   furosemide injection 40 mg (40 mg Intravenous Given 5/7/25 1117)       Discharge Medication List as of 5/7/2025  2:37 PM           Follow-up Information       Elis Wick MD. Schedule an appointment as soon as possible for a visit in 2 days.    Specialty: Internal Medicine  Why: REturn to the Emergency Room, If symptoms worsen  Contact information:  [address] [place] [address]  [phone]                                 Scribe Attestation:   Scribe #1: I performed the above scribed service and the documentation accurately describes the services I performed. I attest to the accuracy of the note.     Attending:   Physician Attestation Statement for Scribe #1: I, Leana Deshpande MD, personally performed the services described in this documentation, as scribed by Jolynn Baltazar, in my presence, and it is both accurate and complete.           Clinical Impression       ICD-10-CM ICD-9-CM   1. Atypical chest pain  R07.89 786.59   2. Chest pain  R07.9 786.50   3. Left-sided chest wall pain  R07.89 786.52   4. History of heart transplant  Z94.1 V42.1   5. CKD (chronic kidney disease) stage 4, GFR 15-29 ml/min  N18.4 585.4   6. Chronic hypertension  I10 401.9       Disposition:   Disposition: Discharged  Condition: Stable       Leana Deshpande MD  05/14/25 1513

## 2025-05-07 NOTE — CONSULTS
[facility] HEART TRANSPLANT  Response for E-Consult     Patient Name: Nadia Damon  MRN: [id number]  Primary Care Provider: Elis Wick MD   Requesting Provider: Leana Deshpande MD  Consults    Recommendation: Musculoskeletal chest pain on exam by referring provider. Mildly elevated troponins after recent cardiac arrest/CPR but EKG unchanged. CP likely MSK in origin so OK to discharge.    Additional future steps to consider: NA    Total time of Consultation: 15 minute    I did not speak to the requesting provider verbally about this.     *This eConsult is based on the clinical data available to me and is furnished without benefit of a physical examination. The eConsult will need to be interpreted in light of any clinical issues or changes in patient status not available to me at the time of filing this eConsults. Significant changes in patient condition or level of acuity should result in immediate formal consultation and reevaluation. Please alert me if you have further questions.    Thank you for this eConsult referral.     Jimi Mendez MD  [facility] HEART TRANSPLANT

## 2025-05-08 ENCOUNTER — TELEPHONE (OUTPATIENT)
Dept: ADMINISTRATIVE | Facility: CLINIC | Age: 70
End: 2025-05-08
Payer: MEDICARE

## 2025-05-08 NOTE — TELEPHONE ENCOUNTER
"Phoned patient in response to reply of "2" to post-discharge texting tracker. Ms. Damon states she was prescribed Mucinex DM to get the mucus out of her chest but she was unable to afford the ~$14 cost since it was not covered by the insurance. She states she is currently behind in her rent and electricity bills and is in need of financial help. Recommended Robitussin DM generic liquid as a less expensive alternative but she states, although she does need the medication, she just can't afford it right now. Offered to share her information with our Community Health Worker, Ellen GORDON, to give her a call back and discuss any possible resources. Ms. Damon accepted this outcome and patient's information and concerns were messaged to Ellen.           "

## 2025-05-09 ENCOUNTER — TELEPHONE (OUTPATIENT)
Dept: TRANSPLANT | Facility: CLINIC | Age: 70
End: 2025-05-09
Payer: MEDICARE

## 2025-05-09 PROCEDURE — G0180 MD CERTIFICATION HHA PATIENT: HCPCS | Mod: ,,, | Performed by: INTERNAL MEDICINE

## 2025-05-09 NOTE — TELEPHONE ENCOUNTER
Left a message for pt - returning call and to check on her status, pt stated she needed to go to [facility] for Chest pain.

## 2025-05-10 LAB
OHS QRS DURATION: 148 MS
OHS QTC CALCULATION: 489 MS

## 2025-05-12 ENCOUNTER — HOSPITAL ENCOUNTER (INPATIENT)
Facility: HOSPITAL | Age: 70
LOS: 5 days | Discharge: ANOTHER HOSPITAL | DRG: 683 | End: 2025-05-17
Attending: EMERGENCY MEDICINE | Admitting: HOSPITALIST
Payer: MEDICARE

## 2025-05-12 ENCOUNTER — CLINICAL SUPPORT (OUTPATIENT)
Dept: PRIMARY CARE CLINIC | Facility: CLINIC | Age: 70
End: 2025-05-12
Payer: MEDICARE

## 2025-05-12 ENCOUNTER — OFFICE VISIT (OUTPATIENT)
Dept: PRIMARY CARE CLINIC | Facility: CLINIC | Age: 70
End: 2025-05-12
Payer: MEDICARE

## 2025-05-12 VITALS
BODY MASS INDEX: 25.21 KG/M2 | WEIGHT: 137 LBS | OXYGEN SATURATION: 94 % | TEMPERATURE: 98 F | SYSTOLIC BLOOD PRESSURE: 102 MMHG | HEART RATE: 88 BPM | HEIGHT: 62 IN | DIASTOLIC BLOOD PRESSURE: 52 MMHG

## 2025-05-12 DIAGNOSIS — I95.9 HYPOTENSION, UNSPECIFIED HYPOTENSION TYPE: ICD-10-CM

## 2025-05-12 DIAGNOSIS — I25.758 CORONARY ARTERY DISEASE OF NATIVE ARTERY OF TRANSPLANTED HEART WITH STABLE ANGINA PECTORIS: Primary | ICD-10-CM

## 2025-05-12 DIAGNOSIS — K43.9 VENTRAL HERNIA WITHOUT OBSTRUCTION OR GANGRENE: Chronic | ICD-10-CM

## 2025-05-12 DIAGNOSIS — R53.1 WEAKNESS: ICD-10-CM

## 2025-05-12 DIAGNOSIS — F41.8 DEPRESSION WITH ANXIETY: Primary | ICD-10-CM

## 2025-05-12 DIAGNOSIS — Z94.1 HISTORY OF HEART TRANSPLANT: ICD-10-CM

## 2025-05-12 DIAGNOSIS — N18.5 CKD (CHRONIC KIDNEY DISEASE) STAGE 5, GFR LESS THAN 15 ML/MIN: Chronic | ICD-10-CM

## 2025-05-12 DIAGNOSIS — D63.1 ANEMIA DUE TO STAGE 5 CHRONIC KIDNEY DISEASE, NOT ON CHRONIC DIALYSIS: ICD-10-CM

## 2025-05-12 DIAGNOSIS — F32.1 MAJOR DEPRESSIVE DISORDER, SINGLE EPISODE, MODERATE: ICD-10-CM

## 2025-05-12 DIAGNOSIS — N18.5 ANEMIA DUE TO STAGE 5 CHRONIC KIDNEY DISEASE, NOT ON CHRONIC DIALYSIS: ICD-10-CM

## 2025-05-12 DIAGNOSIS — R07.9 CHEST PAIN: ICD-10-CM

## 2025-05-12 DIAGNOSIS — R79.89 TROPONIN I ABOVE REFERENCE RANGE: ICD-10-CM

## 2025-05-12 DIAGNOSIS — N17.9 ACUTE RENAL FAILURE SUPERIMPOSED ON CHRONIC KIDNEY DISEASE, UNSPECIFIED ACUTE RENAL FAILURE TYPE, UNSPECIFIED CKD STAGE: ICD-10-CM

## 2025-05-12 DIAGNOSIS — N18.9 ACUTE RENAL FAILURE SUPERIMPOSED ON CHRONIC KIDNEY DISEASE, UNSPECIFIED ACUTE RENAL FAILURE TYPE, UNSPECIFIED CKD STAGE: ICD-10-CM

## 2025-05-12 DIAGNOSIS — I95.1 ORTHOSTATIC HYPOTENSION: Primary | ICD-10-CM

## 2025-05-12 DIAGNOSIS — D61.818 OTHER PANCYTOPENIA: ICD-10-CM

## 2025-05-12 LAB
ABSOLUTE EOSINOPHIL (OHS): 0.13 K/UL
ABSOLUTE MONOCYTE (OHS): 0.38 K/UL (ref 0.3–1)
ABSOLUTE NEUTROPHIL COUNT (OHS): 2.31 K/UL (ref 1.8–7.7)
ALBUMIN SERPL BCP-MCNC: 3.5 G/DL (ref 3.5–5.2)
ALP SERPL-CCNC: 195 UNIT/L (ref 40–150)
ALT SERPL W/O P-5'-P-CCNC: 28 UNIT/L (ref 10–44)
AMM URATE CRY UR QL COMP ASSIST: ABNORMAL
AMORPH CRY UR QL COMP ASSIST: ABNORMAL
ANION GAP (OHS): 14 MMOL/L (ref 8–16)
AST SERPL-CCNC: 36 UNIT/L (ref 11–45)
BACTERIA #/AREA URNS AUTO: ABNORMAL /HPF
BASOPHILS # BLD AUTO: 0.01 K/UL
BASOPHILS NFR BLD AUTO: 0.3 %
BILIRUB SERPL-MCNC: 0.4 MG/DL (ref 0.1–1)
BILIRUB UR QL STRIP.AUTO: NEGATIVE
BNP SERPL-MCNC: 406 PG/ML (ref 0–99)
BUN SERPL-MCNC: 45 MG/DL (ref 8–23)
CA CARBONATE CRY UR QL COMP ASSIST: ABNORMAL
CA PHOS CRY UR QL COMP ASSIST: ABNORMAL
CALCIUM SERPL-MCNC: 9.5 MG/DL (ref 8.7–10.5)
CAOX CRY UR QL COMP ASSIST: ABNORMAL
CHLORIDE SERPL-SCNC: 102 MMOL/L (ref 95–110)
CLARITY UR: ABNORMAL
CO2 SERPL-SCNC: 21 MMOL/L (ref 23–29)
COLOR UR AUTO: YELLOW
CREAT SERPL-MCNC: 3.9 MG/DL (ref 0.5–1.4)
EPITH CASTS #/AREA UR COMP ASSIST: 0 /LPF (ref ?–0)
ERYTHROCYTE [DISTWIDTH] IN BLOOD BY AUTOMATED COUNT: 15.5 % (ref 11.5–14.5)
FATTY CASTS UR QL COMP ASSIST: 0 /LPF (ref ?–0)
GFR SERPLBLD CREATININE-BSD FMLA CKD-EPI: 12 ML/MIN/1.73/M2
GLUCOSE SERPL-MCNC: 97 MG/DL (ref 70–110)
GLUCOSE UR QL STRIP: NEGATIVE
GRAN CASTS UR QL COMP ASSIST: 0 /LPF (ref ?–0)
HCT VFR BLD AUTO: 29.2 % (ref 37–48.5)
HGB BLD-MCNC: 9.5 GM/DL (ref 12–16)
HGB UR QL STRIP: NEGATIVE
HOLD SPECIMEN: NORMAL
HYALINE CASTS UR QL AUTO: 0 /LPF (ref 0–1)
IMM GRANULOCYTES # BLD AUTO: 0.02 K/UL (ref 0–0.04)
IMM GRANULOCYTES NFR BLD AUTO: 0.5 % (ref 0–0.5)
KETONES UR QL STRIP: NEGATIVE
LEUKOCYTE ESTERASE UR QL STRIP: ABNORMAL
LYMPHOCYTES # BLD AUTO: 0.95 K/UL (ref 1–4.8)
MAGNESIUM SERPL-MCNC: 1.9 MG/DL (ref 1.6–2.6)
MCH RBC QN AUTO: 28.9 PG (ref 27–31)
MCHC RBC AUTO-ENTMCNC: 32.5 G/DL (ref 32–36)
MCV RBC AUTO: 89 FL (ref 82–98)
MICROSCOPIC COMMENT: ABNORMAL
NITRITE UR QL STRIP: NEGATIVE
NON-SQ EPI CELLS #/AREA URNS AUTO: 1 /HPF
NUCLEATED RBC (/100WBC) (OHS): 0 /100 WBC
OTHER ELEMENTS URNS MICRO: ABNORMAL
PH UR STRIP: 6 [PH]
PLATELET # BLD AUTO: 238 K/UL (ref 150–450)
PMV BLD AUTO: 9.3 FL (ref 9.2–12.9)
POTASSIUM SERPL-SCNC: 4.4 MMOL/L (ref 3.5–5.1)
PROT SERPL-MCNC: 9 GM/DL (ref 6–8.4)
PROT UR QL STRIP: ABNORMAL
RBC # BLD AUTO: 3.29 M/UL (ref 4–5.4)
RBC #/AREA URNS AUTO: <1 /HPF (ref 0–4)
RBC CASTS UR QL COMP ASSIST: 0 /LPF (ref ?–0)
RELATIVE EOSINOPHIL (OHS): 3.4 %
RELATIVE LYMPHOCYTE (OHS): 25 % (ref 18–48)
RELATIVE MONOCYTE (OHS): 10 % (ref 4–15)
RELATIVE NEUTROPHIL (OHS): 60.8 % (ref 38–73)
SODIUM SERPL-SCNC: 137 MMOL/L (ref 136–145)
SP GR UR STRIP: 1.01
SQUAMOUS #/AREA URNS AUTO: 8 /HPF
TRI-PHOS CRY UR QL COMP ASSIST: ABNORMAL
TRICHOMONAS UR QL COMP ASSIST: ABNORMAL /HPF
TROPONIN I SERPL DL<=0.01 NG/ML-MCNC: 0.1 NG/ML
TROPONIN I SERPL DL<=0.01 NG/ML-MCNC: 0.14 NG/ML
UNSPECIFIED CRY UR QL COMP ASSIST: ABNORMAL
URATE CRY UR QL COMP ASSIST: ABNORMAL
UROBILINOGEN UR STRIP-ACNC: NEGATIVE EU/DL
WAXY CASTS UR QL COMP ASSIST: 0 /LPF (ref ?–0)
WBC # BLD AUTO: 3.8 K/UL (ref 3.9–12.7)
WBC #/AREA URNS AUTO: 17 /HPF (ref 0–5)
WBC CASTS UR QL COMP ASSIST: 0 /LPF (ref ?–0)
WBC CLUMPS UR QL AUTO: ABNORMAL
YEAST UR QL AUTO: ABNORMAL /HPF

## 2025-05-12 PROCEDURE — 96360 HYDRATION IV INFUSION INIT: CPT | Mod: HCNC

## 2025-05-12 PROCEDURE — 99285 EMERGENCY DEPT VISIT HI MDM: CPT | Mod: 25,HCNC

## 2025-05-12 PROCEDURE — 83735 ASSAY OF MAGNESIUM: CPT | Mod: HCNC | Performed by: EMERGENCY MEDICINE

## 2025-05-12 PROCEDURE — 99999 PR PBB SHADOW E&M-EST. PATIENT-LVL V: CPT | Mod: PBBFAC,HCNC,,

## 2025-05-12 PROCEDURE — 84484 ASSAY OF TROPONIN QUANT: CPT | Mod: HCNC | Performed by: NURSE PRACTITIONER

## 2025-05-12 PROCEDURE — 85025 COMPLETE CBC W/AUTO DIFF WBC: CPT | Mod: HCNC | Performed by: EMERGENCY MEDICINE

## 2025-05-12 PROCEDURE — 36415 COLL VENOUS BLD VENIPUNCTURE: CPT | Mod: HCNC | Performed by: NURSE PRACTITIONER

## 2025-05-12 PROCEDURE — 83880 ASSAY OF NATRIURETIC PEPTIDE: CPT | Mod: HCNC | Performed by: EMERGENCY MEDICINE

## 2025-05-12 PROCEDURE — 99499 UNLISTED E&M SERVICE: CPT | Mod: HCNC,S$GLB,,

## 2025-05-12 PROCEDURE — 81003 URINALYSIS AUTO W/O SCOPE: CPT | Mod: HCNC | Performed by: EMERGENCY MEDICINE

## 2025-05-12 PROCEDURE — 80053 COMPREHEN METABOLIC PANEL: CPT | Mod: HCNC | Performed by: EMERGENCY MEDICINE

## 2025-05-12 PROCEDURE — 87086 URINE CULTURE/COLONY COUNT: CPT | Mod: HCNC | Performed by: EMERGENCY MEDICINE

## 2025-05-12 PROCEDURE — 93010 ELECTROCARDIOGRAM REPORT: CPT | Mod: HCNC,,, | Performed by: INTERNAL MEDICINE

## 2025-05-12 PROCEDURE — 63600175 PHARM REV CODE 636 W HCPCS: Mod: HCNC | Performed by: NURSE PRACTITIONER

## 2025-05-12 PROCEDURE — 84484 ASSAY OF TROPONIN QUANT: CPT | Mod: HCNC | Performed by: EMERGENCY MEDICINE

## 2025-05-12 PROCEDURE — 93005 ELECTROCARDIOGRAM TRACING: CPT | Mod: HCNC

## 2025-05-12 PROCEDURE — 25000003 PHARM REV CODE 250: Mod: HCNC | Performed by: EMERGENCY MEDICINE

## 2025-05-12 PROCEDURE — 21400001 HC TELEMETRY ROOM: Mod: HCNC

## 2025-05-12 RX ORDER — SODIUM CHLORIDE, SODIUM LACTATE, POTASSIUM CHLORIDE, CALCIUM CHLORIDE 600; 310; 30; 20 MG/100ML; MG/100ML; MG/100ML; MG/100ML
INJECTION, SOLUTION INTRAVENOUS CONTINUOUS
Status: DISCONTINUED | OUTPATIENT
Start: 2025-05-12 | End: 2025-05-14

## 2025-05-12 RX ORDER — ONDANSETRON HYDROCHLORIDE 2 MG/ML
4 INJECTION, SOLUTION INTRAVENOUS EVERY 8 HOURS PRN
Status: DISCONTINUED | OUTPATIENT
Start: 2025-05-12 | End: 2025-05-17 | Stop reason: HOSPADM

## 2025-05-12 RX ORDER — HEPARIN SODIUM 5000 [USP'U]/ML
5000 INJECTION, SOLUTION INTRAVENOUS; SUBCUTANEOUS EVERY 8 HOURS
Status: DISCONTINUED | OUTPATIENT
Start: 2025-05-12 | End: 2025-05-17 | Stop reason: HOSPADM

## 2025-05-12 RX ORDER — IBUPROFEN 200 MG
24 TABLET ORAL
Status: DISCONTINUED | OUTPATIENT
Start: 2025-05-12 | End: 2025-05-17 | Stop reason: HOSPADM

## 2025-05-12 RX ORDER — ALUMINUM HYDROXIDE, MAGNESIUM HYDROXIDE, AND SIMETHICONE 1200; 120; 1200 MG/30ML; MG/30ML; MG/30ML
30 SUSPENSION ORAL 4 TIMES DAILY PRN
Status: DISCONTINUED | OUTPATIENT
Start: 2025-05-12 | End: 2025-05-17 | Stop reason: HOSPADM

## 2025-05-12 RX ORDER — TALC
6 POWDER (GRAM) TOPICAL NIGHTLY PRN
Status: DISCONTINUED | OUTPATIENT
Start: 2025-05-12 | End: 2025-05-13

## 2025-05-12 RX ORDER — ACETAMINOPHEN 325 MG/1
650 TABLET ORAL EVERY 6 HOURS PRN
Status: DISCONTINUED | OUTPATIENT
Start: 2025-05-12 | End: 2025-05-17 | Stop reason: HOSPADM

## 2025-05-12 RX ORDER — ACETAMINOPHEN 650 MG/1
650 SUPPOSITORY RECTAL EVERY 6 HOURS PRN
Status: DISCONTINUED | OUTPATIENT
Start: 2025-05-12 | End: 2025-05-17 | Stop reason: HOSPADM

## 2025-05-12 RX ORDER — NALOXONE HCL 0.4 MG/ML
0.02 VIAL (ML) INJECTION
Status: DISCONTINUED | OUTPATIENT
Start: 2025-05-12 | End: 2025-05-17 | Stop reason: HOSPADM

## 2025-05-12 RX ORDER — OXYCODONE HYDROCHLORIDE 5 MG/1
5 TABLET ORAL
Refills: 0 | Status: COMPLETED | OUTPATIENT
Start: 2025-05-12 | End: 2025-05-12

## 2025-05-12 RX ORDER — POLYETHYLENE GLYCOL 3350 17 G/17G
17 POWDER, FOR SOLUTION ORAL DAILY PRN
Status: DISCONTINUED | OUTPATIENT
Start: 2025-05-12 | End: 2025-05-17 | Stop reason: HOSPADM

## 2025-05-12 RX ORDER — SIMETHICONE 80 MG
1 TABLET,CHEWABLE ORAL 4 TIMES DAILY PRN
Status: DISCONTINUED | OUTPATIENT
Start: 2025-05-12 | End: 2025-05-17 | Stop reason: HOSPADM

## 2025-05-12 RX ORDER — GLUCAGON 1 MG
1 KIT INJECTION
Status: DISCONTINUED | OUTPATIENT
Start: 2025-05-12 | End: 2025-05-17 | Stop reason: HOSPADM

## 2025-05-12 RX ORDER — PROMETHAZINE HYDROCHLORIDE 25 MG/1
25 TABLET ORAL EVERY 6 HOURS PRN
Status: DISCONTINUED | OUTPATIENT
Start: 2025-05-12 | End: 2025-05-17 | Stop reason: HOSPADM

## 2025-05-12 RX ORDER — IBUPROFEN 200 MG
16 TABLET ORAL
Status: DISCONTINUED | OUTPATIENT
Start: 2025-05-12 | End: 2025-05-17 | Stop reason: HOSPADM

## 2025-05-12 RX ORDER — SODIUM CHLORIDE 0.9 % (FLUSH) 0.9 %
3 SYRINGE (ML) INJECTION EVERY 12 HOURS PRN
Status: DISCONTINUED | OUTPATIENT
Start: 2025-05-12 | End: 2025-05-17 | Stop reason: HOSPADM

## 2025-05-12 RX ADMIN — HEPARIN SODIUM 5000 UNITS: 5000 INJECTION INTRAVENOUS; SUBCUTANEOUS at 10:05

## 2025-05-12 RX ADMIN — OXYCODONE HYDROCHLORIDE 5 MG: 5 TABLET ORAL at 09:05

## 2025-05-12 RX ADMIN — SODIUM CHLORIDE 250 ML: 9 INJECTION, SOLUTION INTRAVENOUS at 08:05

## 2025-05-12 RX ADMIN — SODIUM CHLORIDE, POTASSIUM CHLORIDE, SODIUM LACTATE AND CALCIUM CHLORIDE: 600; 310; 30; 20 INJECTION, SOLUTION INTRAVENOUS at 10:05

## 2025-05-12 NOTE — ASSESSMENT & PLAN NOTE
No signs of bleeding   Weakness and fatigue noted  Lab Results   Component Value Date    WBC 6.73 05/07/2025    HGB 8.6 (L) 05/07/2025    RBC 2.92 (L) 05/07/2025    HCT 25.9 (L) 05/07/2025     05/07/2025    MCV 89 05/07/2025    CHOL 196 04/25/2025    TRIG 192 (H) 04/25/2025    HDL 59 04/25/2025    LDLCALC 98.6 04/25/2025    ALT 39 05/07/2025    AST 37 05/07/2025     05/07/2025    K 4.5 05/07/2025     05/07/2025    CREATININE 2.9 (H) 05/07/2025    BUN 40 (H) 05/07/2025    CO2 20 (L) 05/07/2025    MG 2.5 05/06/2025    TSH 12.101 (H) 04/25/2025    FREET4 0.96 04/25/2025    PSA <0.1 05/27/2008    INR 0.9 05/07/2025    HGBA1C 5.1 07/02/2024    CRP 14.4 (H) 08/24/2023     Lab Results   Component Value Date    .3 (H) 03/25/2025    CALCIUM 8.6 (L) 05/07/2025    CAION 1.13 09/05/2018    PHOS 2.9 05/06/2025      Lab Results   Component Value Date    LUNAKOWW29 1373 (H) 06/20/2023     Lab Results   Component Value Date    FOLATE 20.0 06/20/2023      Lab Results   Component Value Date    IRON 64 02/24/2025    TRANSFERRIN 214 02/24/2025    TIBC 317 02/24/2025    FESATURATED 20 02/24/2025

## 2025-05-12 NOTE — PROGRESS NOTES
Transitional Care Note  Subjective:      Patient ID:   Nadia Damon is a 69 y.o. female.  05/12/2025  [id number]    Chief Complaint:   Chief Complaint   Patient presents with    Follow-up     Hospital f/u      Family and/or Caretaker present at visit?  No.  Diagnostic tests reviewed/disposition: No diagnosic tests pending after this hospitalization.  Disease/illness education: Chest pain; hypotension,   Home health/community services discussion/referrals: Patient has home health established at [facility].   Establishment or re-establishment of referral orders for community resources: No other necessary community resources.   Discussion with other health care providers: Cardiology follow up.     History of Present Illness: Follow-up  Associated symptoms include chest pain (rib pain bilateral). Pertinent negatives include no abdominal pain, chills, congestion, coughing, fever, headaches, myalgias, nausea, rash, sore throat, vomiting or weakness.      History of Present Illness    CHIEF COMPLAINT:  Nadia presents today for follow up after recent hospitalization in [place] with cardiac arrest.    RECENT CARDIAC EVENT:  She experienced severe chest pain while using the bathroom during recent hospitalization in [place]. The pain progressively worsened, accompanied by dizziness, leading to cardiac arrest requiring CPR. Medical team initially had difficulty obtaining a pulse despite prolonged resuscitation efforts, but eventually succeeded.    CURRENT SYMPTOMS:  She reports chest and rib pain rated as 8/10, wrapping around to the back. She experiences stuttering when in pain, which was addressed in recent speech therapy. She reports episodes of hypotension, with one episode measuring 66/40 mmHg associated with dizziness, compared to her usual blood pressure of 130-140/80.    MEDICAL HISTORY:  She has a history of stroke last year and kidney disease. She is not a candidate for transplant due to extent of  kidney damage. She has a hernia with wires that are now wrapped around the hernia itself.    CURRENT MEDICATIONS:  She takes Carvedilol 3.125 mg twice daily with food, Lasix 40 mg daily, and Nifedipine 60 mg. She reports medication compliance.    HOME HEALTH SERVICES:  She receives home health services including nursing care, physical therapy, and speech therapy. Physical therapy instructed her to use caution when standing up, hold onto support, and use a pillow for support during bathroom visits.    MENTAL HEALTH:  She reports feeling depressed about her current medical situation. She has decided to be DNR status after discussions with her son and medical team regarding her prognosis.          Upcoming appointments:  Future Appointments       Date Provider Specialty Appt Notes    5/19/2025  Lab Ty    5/20/2025 Sharyn Hawkins MD Cardiology hospital f/u    5/21/2025 Elis Wick MD Primary Care Needs [employer]-9 day f/u    5/22/2025 Yudith Mendoza NP Hematology and Oncology 4wk/lab prior/retacrit// [facility]    5/22/2025  Chemotherapy retacrit after np/ hm REF [id number]    5/27/2025 Gunner Melgar MD Nephrology EP/6 week f/u//[facility]    5/28/2025 Luz Dickson NP Palliative Medicine [facility] 3 mth fu    5/29/2025 Jassi Pierre MD Pain Medicine RFA follow up    6/9/2025 Paxton Vasques OD Ophthalmology annual    10/13/2025 Christoph Douglas MD Surgery 6mth f/u//hernia//tot             The following were reviewed: Active problem list, medication list, allergies, family history, social history, and Health Maintenance.     Medications have been reviewed and reconciled with patient at visit today.    Review of Systems   Constitutional:  Negative for chills and fever.   HENT:  Negative for congestion, ear pain, sinus pain and sore throat.    Eyes:  Negative for blurred vision and pain.   Respiratory:  Negative for cough and shortness of breath.    Cardiovascular:  Positive for chest pain (rib pain bilateral).  "Negative for leg swelling.   Gastrointestinal:  Negative for abdominal pain, nausea and vomiting.   Genitourinary:  Negative for dysuria.   Musculoskeletal:  Negative for back pain, falls and myalgias.   Skin:  Negative for itching and rash.   Neurological:  Negative for dizziness, weakness and headaches.   Psychiatric/Behavioral:  Negative for suicidal ideas. The patient does not have insomnia.         See HPI above    Exam:  Vitals:    05/12/25 1359   BP: (!) 102/52   BP Location: Left arm   Patient Position: Sitting   Pulse: 88   Temp: 97.6 °F (36.4 °C)   TempSrc: Skin   SpO2: (!) 94%   Weight: 62.1 kg (137 lb 0.3 oz)   Height: 5' 2" (1.575 m)     Weight: 62.1 kg (137 lb 0.3 oz)   Body mass index is 25.06 kg/m².    BP Readings from Last 3 Encounters:   05/12/25 (!) 102/52   05/07/25 (!) 148/79   05/06/25 128/72        Wt Readings from Last 3 Encounters:   05/12/25 1359 62.1 kg (137 lb 0.3 oz)   05/07/25 0924 59.4 kg (131 lb)   05/05/25 0448 59.5 kg (131 lb 2.8 oz)   05/04/25 0516 58.2 kg (128 lb 4.9 oz)   05/01/25 0900 66.2 kg (146 lb)   04/29/25 0301 70 kg (154 lb 5.2 oz)   04/28/25 1030 69 kg (152 lb 1.9 oz)   04/28/25 0701 69.3 kg (152 lb 12.5 oz)   04/27/25 0745 64.3 kg (141 lb 12.1 oz)   04/26/25 2100 66.5 kg (146 lb 9.7 oz)        Physical Exam  Vitals reviewed.   Constitutional:       General: She is awake.      Appearance: Normal appearance. She is normal weight. She is ill-appearing.      Comments: Intermittent moments of fatigue while interacting w/ patient   HENT:      Head: Normocephalic.      Right Ear: Tympanic membrane, ear canal and external ear normal.      Left Ear: Tympanic membrane, ear canal and external ear normal.      Nose: Nose normal.      Mouth/Throat:      Mouth: Mucous membranes are moist.      Pharynx: Oropharynx is clear.   Eyes:      Extraocular Movements: Extraocular movements intact.      Conjunctiva/sclera: Conjunctivae normal.      Pupils: Pupils are equal, round, and reactive " to light.      Comments: Glasses on    Cardiovascular:      Rate and Rhythm: Normal rate and regular rhythm.      Pulses: Normal pulses.      Heart sounds: Normal heart sounds.   Pulmonary:      Effort: Pulmonary effort is normal.      Breath sounds: Decreased breath sounds present.      Comments: Difficult w/ deep breathing   Abdominal:      General: Bowel sounds are normal.      Palpations: Abdomen is soft.      Hernia: A hernia is present. Hernia is present in the ventral area.   Musculoskeletal:         General: Normal range of motion.      Cervical back: Normal range of motion and neck supple.   Skin:     General: Skin is warm and dry.      Capillary Refill: Capillary refill takes less than 2 seconds.   Neurological:      General: No focal deficit present.      Mental Status: She is alert and oriented to person, place, and time. Mental status is at baseline.      GCS: GCS eye subscore is 4. GCS verbal subscore is 5. GCS motor subscore is 6.   Psychiatric:         Attention and Perception: Attention and perception normal.         Mood and Affect: Mood and affect normal.         Speech: Speech normal.         Behavior: Behavior normal. Behavior is cooperative.         Thought Content: Thought content normal.         Cognition and Memory: Cognition and memory normal.         Judgment: Judgment normal.          Laboratory Reviewed  Lab Results   Component Value Date    WBC 6.73 05/07/2025    HGB 8.6 (L) 05/07/2025    HCT 25.9 (L) 05/07/2025     05/07/2025    MCV 89 05/07/2025    CHOL 196 04/25/2025    TRIG 192 (H) 04/25/2025    HDL 59 04/25/2025    LDLCALC 98.6 04/25/2025    ALT 39 05/07/2025    AST 37 05/07/2025     05/07/2025    K 4.5 05/07/2025     05/07/2025    CREATININE 2.9 (H) 05/07/2025    BUN 40 (H) 05/07/2025    CO2 20 (L) 05/07/2025    MG 2.5 05/06/2025    TSH 12.101 (H) 04/25/2025    FREET4 0.96 04/25/2025    PSA <0.1 05/27/2008    INR 0.9 05/07/2025    HGBA1C 5.1 07/02/2024    CRP  14.4 (H) 08/24/2023     Lab Results   Component Value Date    .3 (H) 03/25/2025    CALCIUM 8.6 (L) 05/07/2025    CAION 1.13 09/05/2018    PHOS 2.9 05/06/2025      Lab Results   Component Value Date    [id number] 1373 (H) 06/20/2023     Lab Results   Component Value Date    FOLATE 20.0 06/20/2023      Lab Results   Component Value Date    IRON 64 02/24/2025    TRANSFERRIN 214 02/24/2025    TIBC 317 02/24/2025    FESATURATED 20 02/24/2025      Lab Results   Component Value Date    EGFRNORACEVR 17 (L) 05/07/2025    ALBUMIN 3.1 (L) 05/07/2025     (H) 05/07/2025     Lab Results   Component Value Date    [id number] 50 03/25/2025          Assessment:   69 y.o. female with multiple co-morbid illnesses here for continued follow up of medical problems.      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 Diagnoses of CKD (chronic kidney disease) stage 5, GFR less than 15 ml/min, Hypotension, unspecified hypotension type, Anemia due to stage 5 chronic kidney disease, not on chronic dialysis, Ventral hernia without obstruction or gangrene, Major depressive disorder, single episode, moderate, and Other pancytopenia were also pertinent to this visit.      Plan:   1. Coronary artery disease of native artery of transplanted heart with stable angina pectoris  Assessment & Plan:  Recommend taking Nitro SL tablet as needed for chest pain  Discuss if BP low to consider holding Nitro  Rib pain continues---recommend using pillow in place of abdominal binder  Closely monitor BP and heart rate  F/U w/ transplant team         2. CKD (chronic kidney disease) stage 5, GFR less than 15 ml/min  Assessment & Plan:  Lab Results   Component Value Date    EGFRNORACEVR 17 (L) 05/07/2025    EGFRNORACEVR 17 (L) 05/06/2025    EGFRNORACEVR 14 (L) 05/05/2025    CREATININE 2.9 (H) 05/07/2025    CREATININE 2.9 (H) 05/06/2025    CREATININE 3.3 (H) 05/05/2025     BUN 40 (H) 05/07/2025    BUN 48 (H) 05/06/2025    BUN 54 (H) 05/05/2025    ALBUMIN 3.1 (L) 05/07/2025    ALBUMIN 3.0 (L) 05/06/2025    ALBUMIN 2.8 (L) 05/05/2025   (    Discuss the following with patient:  Managing blood pressure   Eating foods that are good for the heart and low in sodium   Exercising regularly as tolerated  Getting enough sleep   Managing blood sugar if you have diabetes   Avoiding medications that can further damage the kidneys such as NSAIDs (ibuprofen, naproxen)       3. Hypotension, unspecified hypotension type  Assessment & Plan:  Recommend increasing fluid intake  Low BP readings  Recommend cardiology f/u---may need to lower dose on HTN medication  Continue daily activity, low sodium diet  BP Readings from Last 3 Encounters:   05/12/25 (!) 102/52   05/07/25 (!) 148/79   05/06/25 128/72             4. Anemia due to stage 5 chronic kidney disease, not on chronic dialysis  Assessment & Plan:  No signs of bleeding   Weakness and fatigue noted  Lab Results   Component Value Date    WBC 6.73 05/07/2025    HGB 8.6 (L) 05/07/2025    RBC 2.92 (L) 05/07/2025    HCT 25.9 (L) 05/07/2025     05/07/2025    MCV 89 05/07/2025    CHOL 196 04/25/2025    TRIG 192 (H) 04/25/2025    HDL 59 04/25/2025    LDLCALC 98.6 04/25/2025    ALT 39 05/07/2025    AST 37 05/07/2025     05/07/2025    K 4.5 05/07/2025     05/07/2025    CREATININE 2.9 (H) 05/07/2025    BUN 40 (H) 05/07/2025    CO2 20 (L) 05/07/2025    MG 2.5 05/06/2025    TSH 12.101 (H) 04/25/2025    FREET4 0.96 04/25/2025    PSA <0.1 05/27/2008    INR 0.9 05/07/2025    HGBA1C 5.1 07/02/2024    CRP 14.4 (H) 08/24/2023     Lab Results   Component Value Date    .3 (H) 03/25/2025    CALCIUM 8.6 (L) 05/07/2025    CAION 1.13 09/05/2018    PHOS 2.9 05/06/2025      Lab Results   Component Value Date    [id number] 1373 (H) 06/20/2023     Lab Results   Component Value Date    FOLATE 20.0 06/20/2023      Lab Results   Component Value Date    IRON  64 02/24/2025    TRANSFERRIN 214 02/24/2025    TIBC 317 02/24/2025    FESATURATED 20 02/24/2025          5. Ventral hernia without obstruction or gangrene  Overview:  CT abd Jan 2023    Assessment & Plan:  Occasional pain  Advise to strain while using restroom  Recommend wearing abdomen binder??  Use a pillow w/ coughing and using restroom     CT Abdomen 02/24/25  Impression:     Midline ventral hernia with a 4.3 cm neck containing herniated liver        6. Major depressive disorder, single episode, moderate  Assessment & Plan:  Stable  Recommend talking to son more  Patient decided on DNR status during hospital stay       7. Other pancytopenia  Assessment & Plan:  No signs of bleeding   Weakness and fatigue noted  Lab Results   Component Value Date    WBC 6.73 05/07/2025    HGB 8.6 (L) 05/07/2025    RBC 2.92 (L) 05/07/2025    HCT 25.9 (L) 05/07/2025     05/07/2025    MCV 89 05/07/2025    CHOL 196 04/25/2025    TRIG 192 (H) 04/25/2025    HDL 59 04/25/2025    LDLCALC 98.6 04/25/2025    ALT 39 05/07/2025    AST 37 05/07/2025     05/07/2025    K 4.5 05/07/2025     05/07/2025    CREATININE 2.9 (H) 05/07/2025    BUN 40 (H) 05/07/2025    CO2 20 (L) 05/07/2025    MG 2.5 05/06/2025    TSH 12.101 (H) 04/25/2025    FREET4 0.96 04/25/2025    PSA <0.1 05/27/2008    INR 0.9 05/07/2025    HGBA1C 5.1 07/02/2024    CRP 14.4 (H) 08/24/2023     Lab Results   Component Value Date    .3 (H) 03/25/2025    CALCIUM 8.6 (L) 05/07/2025    CAION 1.13 09/05/2018    PHOS 2.9 05/06/2025      Lab Results   Component Value Date    [id number] 1373 (H) 06/20/2023     Lab Results   Component Value Date    FOLATE 20.0 06/20/2023      Lab Results   Component Value Date    IRON 64 02/24/2025    TRANSFERRIN 214 02/24/2025    TIBC 317 02/24/2025    FESATURATED 20 02/24/2025               Health Maintenance         Date Due Completion Date    RSV Vaccine (Age 60+ and Pregnant patients) (1 - Risk 60-74 years 1-dose series) Never  done ---    Mammogram 04/18/2025 4/18/2024    Colorectal Cancer Screening 02/25/2026 2/25/2021    Lipid Panel 04/25/2026 4/25/2025    Hemoglobin A1c (Diabetic Prevention Screening) 07/02/2027 7/2/2024    DEXA Scan 07/03/2027 7/3/2024    TETANUS VACCINE 09/09/2030 9/9/2020            -Patient's lab results were reviewed and discussed with patient  -Treatment options and alternatives were discussed with the patient. Patient expressed understanding. Patient was given the opportunity to ask questions and be an active participant in their medical care. Patient had no further questions or concerns at this time.       Follow up: Follow up in about 9 days (around 5/21/2025) for REZA Wick MD .    After visit summary printed and given to patient upon discharge.  Patient goals and care plan are included in After visit summary.    TOTAL TIME evaluating and managing this patient for this encounter was 45 minutes. This time was spent personally by me on some of the following activities: review of patient's past medical history, assessing age-appropriate health maintenance needs, review of any interval history, review and interpretation of lab results, review and interpretation of imaging test results, review and interpretation of cardiology test results, reviewing consulting specialist notes, obtaining history from the patient and family, examination of the patient, medication reconciliation, managing and/or ordering prescription medications, ordering imaging tests, ordering referral to subspecialty provider(s), educating patient and answering their questions about diagnosis, treatment plan, and goals of treatment, discussing planned follow-up and final documentation of the visit. This time was exclusive of any separately billable procedures for this patient and exclusive of time spent treating any other patients.     This note was generated with the assistance of ambient listening technology. Verbal consent was obtained by  the patient and accompanying visitor(s) for the recording of patient appointment to facilitate this note. I attest to having reviewed and edited the generated note for accuracy, though some syntax or spelling errors may persist. Please contact the author of this note for any clarification.              ROSSANA Bazan, FNP-C  [facility] [address] [place] [address]   [phone]   [phone] fax

## 2025-05-12 NOTE — ASSESSMENT & PLAN NOTE
Lab Results   Component Value Date    EGFRNORACEVR 17 (L) 05/07/2025    EGFRNORACEVR 17 (L) 05/06/2025    EGFRNORACEVR 14 (L) 05/05/2025    CREATININE 2.9 (H) 05/07/2025    CREATININE 2.9 (H) 05/06/2025    CREATININE 3.3 (H) 05/05/2025    BUN 40 (H) 05/07/2025    BUN 48 (H) 05/06/2025    BUN 54 (H) 05/05/2025    ALBUMIN 3.1 (L) 05/07/2025    ALBUMIN 3.0 (L) 05/06/2025    ALBUMIN 2.8 (L) 05/05/2025   (    Discuss the following with patient:  Managing blood pressure   Eating foods that are good for the heart and low in sodium   Exercising regularly as tolerated  Getting enough sleep   Managing blood sugar if you have diabetes   Avoiding medications that can further damage the kidneys such as NSAIDs (ibuprofen, naproxen)

## 2025-05-12 NOTE — PATIENT INSTRUCTIONS
If you are feeling unwell, we'd like to be the first ones to know here at [facility]! Please give us a call. Same day appointments are our top priority to keep you well and out of the emergency rooms and hospitals. Call [phone] for our direct line. After hours advice is always available. Please call [phone] after hours to speak to the on-call team.      Change positions slowly     Increase fluid intake---recommend trying water w/ electrolyte     Recommend keeping small pillow w/ her in replacement of abdominal binder

## 2025-05-12 NOTE — PROGRESS NOTES
DATE:  2025  REFERRAL SOURCE:  Elis Wick MD  TYPE OF VISIT:  In person  LENGTH OF SESSION: 60  .  HISTORY OF PRESENTING ILLNESS:  Nadia Damon, a 69 y.o. female with history of Anxiety disorders; anxiety, unspecified [F41.9], Major Depressive Disorder, Recurrent, Moderate (F33.1), and Persistent insomnia with other symptoms [G47.00].       CHIEF COMPLAINT/REASON FOR ENCOUNTER: Pt's chief complaint includes the following:  sleep, and grief.    PSYCHIATRIC HISTORY:  Patient does not currently have a psychiatrist.    Patient does not currently have a therapist.     Pt is taking temazepam (Restoril) 30mg once daily and zolpidem (Ambien) 0.5mg once daily for sleep.  They are not interested in medication changes.  Previous Psychiatric Hospitalizations:  No  History of Trauma:  Heart transplant; CVA.    History of Violence:  No  Access to a gun/weapon:  No  Previous Suicide Attempts:  No    Risk assessment:  Patient reports no suicidal ideation  Patient reports no homicidal ideation  Patient reports no self-injurious behavior  Patient reports no violent behavior    PSYCHIATRIC FAMILY HISTORY: Parents were alcoholic; SonMoy is an alcoholic.  Grandson has hx of depression      SOCIAL HISTORY (MARRIAGE, EMPLOYMENT, etc.):  Living Situation: Alone, at [facility].   Relationship status Single; never .   Children/Family: 2 sons. 1 sonFlakito, is .  SonMoy Jr lives in [place] with wife.  Estranged granddtrQiana.  Estranged grandsonMoy.  Cousin.   Supports:Pentecostalism friends.   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  Pent[employer]al/Spirituality: [employer]. Volunteers as a .   Hobbies and Interests: Crafting.     Current social stressors:   CVA 2024.  Loss of driving.CKD 4. May need dialysis soon.  Hx of heart transplant in .  Transplanted heart now at [facility].   Grandson's arrest in 2024; news  coverage of his arrest.    Death of son, Flakito.  Years ago. Loss of contact with Flakito's daughter, Qiana (or Alda Kiran).       Current symptoms:  Depression: decreased appetite.  Anxiety: denies.  Insomnia: chronic insomnia. Years of dealing with this. .  Astrid:  pressured speech.  Psychosis: denies .    MENTAL HEALTH STATUS EXAM  General Appearance:  unremarkable, age appropriate   Speech: normal pitch, normal volume, pressured, rapid      Level of Cooperation: cooperative      Thought Processes: normal and logical   Mood: steady      Thought Content: normal, no suicidality, no homicidality, delusions, or paranoia   Affect: congruent and appropriate   Orientation: Oriented x3   Memory: Appears WNL; patient not tested.    Attention Span & Concentration: intact   Fund of General Knowledge: intact and appropriate to age and level of education   Judgment & Insight: good   Language  intact     Session Content/Presenting Problem Hx:  November 2024: PHQ 13; LUPE 15  February 2025 PHQ/LUPE: 16/16 9th session.  S: Met with patient after her MD visit today. She describes her recent hospitalization; she was in ICU and intubated in restraints for a while.  During her hospitalization she agreed to be made DNR.  Patient describes the traumatic events she experienced while in the hospital.  She felt at one point that she was near death. She describes hearing the staff but being unable to open her eyes or to move.  She describes feeling okay now although her ribs remain sore.  Nadia also discusses her son Moy and her ongoing sense of disappointment in his interactions with her.  She thinks he does not love her which makes her sad; this thought is based on him not coming to see her while she was hospitalized. She feels more supported by her daughter in law, as well as her former daughter in law. She was very happy to see Moy's son on Mother's Day and enjoyed her former daughter in law's offers of assistance. She  reports that she was able to sleep well after seeing her grandson on Mother's Day.  Nadia also notes how supportive her friend and former employer [employer] has been.  Nadia is optimistic that having help in her home to do laundry and clean will be good for her.  O: Patient appears worried about her genuine health concerns. She is disappointed in her son; her expectations of him do not match the reality.     A: Nadia is coping with serious health concerns. She is contemplating the possibility of her life ending soon. She expresses feelings of longing to have her family close by. She does appear calmer and more accepting of her health conditions than she has previously.   She has been thinking of her  son who  on . She continues to process her feelings of loss and sadness.  P: Will continue to be supportive of patient as she megan with her health. Will assist patient in reframing negative thoughts and allowing for more positive thoughts; particularly in relation to her son Moy. Will continue to encourage her to participate in activities but will also encourage her to be more mindful of her physical limitations.       Prior Session:   8th session. Met with patient.  Appears to be in less pain today.Going to see her pain management doctor tomorrow. Getting another nerve block.  Having a lot of chest pain so taking nitroglycerin almost every day or every other day.   Describes sitting and holding her chest.   Empathized with patient as she describes living with complex medical issues.    Patient reports some success in calming herself one time this past week.  She actually fell asleep and took a nap.  She discusses her difficulty in calming her mind enough to allow herself to relax; had to talk herself out of thoughts relating to various chores.   She used an aleksandar on her phone of [employer] which she finds relaxing; this helped take her mind off household tasks. She describes some of these  "thoughts: rearrange her towels in linen closet to line up correctly; rearrange items in her refrigerator.   Patient is supported and commended for having successfully allowed herself to relax enough to nap.  She is encouraged to continue with these efforts.  Explored with patient how to acknowledge the "busy" thoughts and then put them to rest with deliberate alternative thoughts.  Discussed the benefit of allowing herself to rest.  Patient expresses understanding of these concepts and agrees to continue her efforts to relax more often.   Continued discussion of the stressor of her son's alcoholism and his verbal outbursts directed at her and at his wife.  Patient expresses an understanding of the need to protect her own mental well being by maintaining boundaries with her son.  She continues her efforts to be supportive of her daughter in law while also encouraging dtr in law to allow her son to suffer the consequences of his actions.   Patient reports feeling mentally ready to begin dialysis. She will meet with the surgeon soon to discuss having access placed.  She has been watching videos about dialysis and plans to attend a Kidney Smart class soon.  Patient is supported in her efforts to do all she can to continue to maintain her health as much as is possible.             STRENGTHS AND LIABILITIES: Strength: Patient is expressive/articulate., Strength: Patient is motivated for change., Strength: Patient has positive support network.    IMPRESSION:   My diagnostic impression is Anxiety disorders; anxiety, unspecified [F41.9], MAJOR DEPRESSIVE DISORDER, SINGLE EPISODE, Moderate (F32.1), and uncomp bereavement, as evidenced by patient's description of increased feelings of sadness and fear related to recent hospitalization; feeling down at times, tearfulness, sadness from loss of son, grandson, granddaughter, family stressors, health stressors. She does not sleep well.     TREATMENT GOALS: Anxiety: reducing " negative automatic thoughts, reducing physical symptoms of anxiety, and reducing time spent worrying (<30 minutes/day)  Depression: increasing self-reward for positive behaviors (one/day), increasing self-reward for positive thoughts (one/day), and reducing fatigue  Grief: process grief related to son's death, estrangement from a grandson and a granddaughter.     PLAN: In this session a psychosocial evaluation was conducted to get history and determine a treatment plan. CBT will be utilized in future individual  therapy sessions to increase interaction, insight, support, and behavior modification.     NEXT APPOINTMENT:

## 2025-05-12 NOTE — ASSESSMENT & PLAN NOTE
Recommend taking Nitro SL tablet as needed for chest pain  Discuss if BP low to consider holding Nitro  Rib pain continues---recommend using pillow in place of abdominal binder  Closely monitor BP and heart rate  F/U w/ transplant team

## 2025-05-12 NOTE — PROGRESS NOTES
Upon leaving facility, staff were called outside d/t pt becoming weak. Pt placed in wheelchair and placed in treatment room. VS taken initial BP 99/56 with HR of 83 98% on RA. Water provided upon pt's request. Pt denied the EMS being called.

## 2025-05-12 NOTE — ASSESSMENT & PLAN NOTE
Recommend increasing fluid intake  Low BP readings  Recommend cardiology f/u---may need to lower dose on HTN medication  Continue daily activity, low sodium diet  BP Readings from Last 3 Encounters:   05/12/25 (!) 102/52   05/07/25 (!) 148/79   05/06/25 128/72

## 2025-05-12 NOTE — PROGRESS NOTES
[facility] given to RENETTA Noel. Called EMS for ETA reports that they are enroute and d/t heavy traffic and or being pulled to another call she is unable to provide one at this time.

## 2025-05-12 NOTE — PROGRESS NOTES
Pt became more lethargic, arousal to voice and touch. Manual BP taken 90/42,  assessing pt, informed pt that EMS will be called at this time. Pt verbalized understanding.   -------------------------------------------------------------------------------------------------------------------------------------  Pt drinking water, awake and talking but still feeling weak, tired, light-headed. Denies CP, palpitation, SOB. No appetite but denies nausea.   Repeat manual BP 80/40?  EMS arrived and manually transferred pt from transport chair to stretcher for transport to [facility] per pt request.

## 2025-05-12 NOTE — ASSESSMENT & PLAN NOTE
Occasional pain  Advise to strain while using restroom  Recommend wearing abdomen binder??  Use a pillow w/ coughing and using restroom     CT Abdomen 02/24/25  Impression:     Midline ventral hernia with a 4.3 cm neck containing herniated liver

## 2025-05-13 PROBLEM — N18.5 ACUTE KIDNEY INJURY SUPERIMPOSED ON STAGE 5 CHRONIC KIDNEY DISEASE, NOT ON CHRONIC DIALYSIS: Status: ACTIVE | Noted: 2025-05-13

## 2025-05-13 PROBLEM — N18.4 ACUTE KIDNEY INJURY SUPERIMPOSED ON STAGE 4 CHRONIC KIDNEY DISEASE: Status: ACTIVE | Noted: 2025-05-13

## 2025-05-13 PROBLEM — N18.5 ACUTE KIDNEY INJURY SUPERIMPOSED ON STAGE 5 CHRONIC KIDNEY DISEASE, NOT ON CHRONIC DIALYSIS: Status: RESOLVED | Noted: 2025-05-13 | Resolved: 2025-05-13

## 2025-05-13 PROBLEM — N17.9 ACUTE KIDNEY INJURY SUPERIMPOSED ON STAGE 5 CHRONIC KIDNEY DISEASE, NOT ON CHRONIC DIALYSIS: Status: ACTIVE | Noted: 2025-05-13

## 2025-05-13 PROBLEM — N17.9 ACUTE KIDNEY INJURY SUPERIMPOSED ON STAGE 5 CHRONIC KIDNEY DISEASE, NOT ON CHRONIC DIALYSIS: Status: RESOLVED | Noted: 2025-05-13 | Resolved: 2025-05-13

## 2025-05-13 PROBLEM — R55 SYNCOPE: Status: ACTIVE | Noted: 2025-05-13

## 2025-05-13 PROBLEM — D64.9 NORMOCYTIC ANEMIA: Chronic | Status: ACTIVE | Noted: 2025-05-13

## 2025-05-13 PROBLEM — R79.89 ELEVATED TROPONIN: Status: ACTIVE | Noted: 2025-05-13

## 2025-05-13 PROBLEM — N17.9 ACUTE KIDNEY INJURY SUPERIMPOSED ON STAGE 4 CHRONIC KIDNEY DISEASE: Status: ACTIVE | Noted: 2025-05-13

## 2025-05-13 LAB
ABO + RH BLD: NORMAL
ABSOLUTE EOSINOPHIL (OHS): 0.11 K/UL
ABSOLUTE MONOCYTE (OHS): 0.41 K/UL (ref 0.3–1)
ABSOLUTE NEUTROPHIL COUNT (OHS): 2.5 K/UL (ref 1.8–7.7)
ANION GAP (OHS): 10 MMOL/L (ref 8–16)
BASOPHILS # BLD AUTO: 0.02 K/UL
BASOPHILS NFR BLD AUTO: 0.5 %
BLD PROD TYP BPU: NORMAL
BLOOD UNIT EXPIRATION DATE: NORMAL
BLOOD UNIT TYPE CODE: 5100
BUN SERPL-MCNC: 43 MG/DL (ref 8–23)
CALCIUM SERPL-MCNC: 8.4 MG/DL (ref 8.7–10.5)
CHLORIDE SERPL-SCNC: 108 MMOL/L (ref 95–110)
CO2 SERPL-SCNC: 22 MMOL/L (ref 23–29)
CREAT SERPL-MCNC: 3.5 MG/DL (ref 0.5–1.4)
CROSSMATCH INTERPRETATION: NORMAL
DISPENSE STATUS: NORMAL
ERYTHROCYTE [DISTWIDTH] IN BLOOD BY AUTOMATED COUNT: 15.4 % (ref 11.5–14.5)
FOLATE SERPL-MCNC: 14.1 NG/ML (ref 4–24)
GFR SERPLBLD CREATININE-BSD FMLA CKD-EPI: 14 ML/MIN/1.73/M2
GLUCOSE SERPL-MCNC: 104 MG/DL (ref 70–110)
HCT VFR BLD AUTO: 23.4 % (ref 37–48.5)
HGB BLD-MCNC: 7.6 GM/DL (ref 12–16)
IMM GRANULOCYTES # BLD AUTO: 0.02 K/UL (ref 0–0.04)
IMM GRANULOCYTES NFR BLD AUTO: 0.5 % (ref 0–0.5)
INDIRECT COOMBS: NORMAL
IRON SATN MFR SERPL: 19 % (ref 20–50)
IRON SERPL-MCNC: 52 UG/DL (ref 30–160)
LYMPHOCYTES # BLD AUTO: 0.84 K/UL (ref 1–4.8)
MCH RBC QN AUTO: 28.7 PG (ref 27–31)
MCHC RBC AUTO-ENTMCNC: 32.5 G/DL (ref 32–36)
MCV RBC AUTO: 88 FL (ref 82–98)
NUCLEATED RBC (/100WBC) (OHS): 0 /100 WBC
OHS QRS DURATION: 154 MS
OHS QTC CALCULATION: 504 MS
PLATELET # BLD AUTO: 207 K/UL (ref 150–450)
PMV BLD AUTO: 9.5 FL (ref 9.2–12.9)
POTASSIUM SERPL-SCNC: 4.2 MMOL/L (ref 3.5–5.1)
RBC # BLD AUTO: 2.65 M/UL (ref 4–5.4)
RELATIVE EOSINOPHIL (OHS): 2.8 %
RELATIVE LYMPHOCYTE (OHS): 21.5 % (ref 18–48)
RELATIVE MONOCYTE (OHS): 10.5 % (ref 4–15)
RELATIVE NEUTROPHIL (OHS): 64.2 % (ref 38–73)
RH BLD: NORMAL
SODIUM SERPL-SCNC: 140 MMOL/L (ref 136–145)
SPECIMEN OUTDATE: NORMAL
T3FREE SERPL-MCNC: 2.4 PG/ML (ref 2.3–4.2)
T4 FREE SERPL-MCNC: 0.87 NG/DL (ref 0.71–1.51)
TIBC SERPL-MCNC: 269 UG/DL (ref 250–450)
TRANSFERRIN SERPL-MCNC: 182 MG/DL (ref 200–375)
TROPONIN I SERPL DL<=0.01 NG/ML-MCNC: 0.11 NG/ML
UNIT NUMBER: NORMAL
VIT B12 SERPL-MCNC: 1623 PG/ML (ref 210–950)
WBC # BLD AUTO: 3.9 K/UL (ref 3.9–12.7)

## 2025-05-13 PROCEDURE — 30233N1 TRANSFUSION OF NONAUTOLOGOUS RED BLOOD CELLS INTO PERIPHERAL VEIN, PERCUTANEOUS APPROACH: ICD-10-PCS | Performed by: STUDENT IN AN ORGANIZED HEALTH CARE EDUCATION/TRAINING PROGRAM

## 2025-05-13 PROCEDURE — 25000003 PHARM REV CODE 250: Mod: HCNC | Performed by: NURSE PRACTITIONER

## 2025-05-13 PROCEDURE — 82607 VITAMIN B-12: CPT | Mod: HCNC | Performed by: STUDENT IN AN ORGANIZED HEALTH CARE EDUCATION/TRAINING PROGRAM

## 2025-05-13 PROCEDURE — 36415 COLL VENOUS BLD VENIPUNCTURE: CPT | Mod: HCNC | Performed by: STUDENT IN AN ORGANIZED HEALTH CARE EDUCATION/TRAINING PROGRAM

## 2025-05-13 PROCEDURE — 36430 TRANSFUSION BLD/BLD COMPNT: CPT | Mod: HCNC

## 2025-05-13 PROCEDURE — 63600175 PHARM REV CODE 636 W HCPCS: Mod: HCNC | Performed by: NURSE PRACTITIONER

## 2025-05-13 PROCEDURE — P9040 RBC LEUKOREDUCED IRRADIATED: HCPCS | Mod: HCNC | Performed by: STUDENT IN AN ORGANIZED HEALTH CARE EDUCATION/TRAINING PROGRAM

## 2025-05-13 PROCEDURE — 86920 COMPATIBILITY TEST SPIN: CPT | Mod: HCNC | Performed by: STUDENT IN AN ORGANIZED HEALTH CARE EDUCATION/TRAINING PROGRAM

## 2025-05-13 PROCEDURE — 80158 DRUG ASSAY CYCLOSPORINE: CPT | Mod: HCNC | Performed by: NURSE PRACTITIONER

## 2025-05-13 PROCEDURE — 36415 COLL VENOUS BLD VENIPUNCTURE: CPT | Mod: HCNC | Performed by: NURSE PRACTITIONER

## 2025-05-13 PROCEDURE — 99233 SBSQ HOSP IP/OBS HIGH 50: CPT | Mod: HCNC,,, | Performed by: INTERNAL MEDICINE

## 2025-05-13 PROCEDURE — 82746 ASSAY OF FOLIC ACID SERUM: CPT | Mod: HCNC | Performed by: STUDENT IN AN ORGANIZED HEALTH CARE EDUCATION/TRAINING PROGRAM

## 2025-05-13 PROCEDURE — 83540 ASSAY OF IRON: CPT | Mod: HCNC | Performed by: STUDENT IN AN ORGANIZED HEALTH CARE EDUCATION/TRAINING PROGRAM

## 2025-05-13 PROCEDURE — 82310 ASSAY OF CALCIUM: CPT | Mod: HCNC | Performed by: NURSE PRACTITIONER

## 2025-05-13 PROCEDURE — 86850 RBC ANTIBODY SCREEN: CPT | Mod: HCNC | Performed by: STUDENT IN AN ORGANIZED HEALTH CARE EDUCATION/TRAINING PROGRAM

## 2025-05-13 PROCEDURE — 84484 ASSAY OF TROPONIN QUANT: CPT | Mod: HCNC | Performed by: NURSE PRACTITIONER

## 2025-05-13 PROCEDURE — 84439 ASSAY OF FREE THYROXINE: CPT | Mod: HCNC | Performed by: STUDENT IN AN ORGANIZED HEALTH CARE EDUCATION/TRAINING PROGRAM

## 2025-05-13 PROCEDURE — 21400001 HC TELEMETRY ROOM: Mod: HCNC

## 2025-05-13 PROCEDURE — 85025 COMPLETE CBC W/AUTO DIFF WBC: CPT | Mod: HCNC | Performed by: NURSE PRACTITIONER

## 2025-05-13 PROCEDURE — 84481 FREE ASSAY (FT-3): CPT | Mod: HCNC | Performed by: STUDENT IN AN ORGANIZED HEALTH CARE EDUCATION/TRAINING PROGRAM

## 2025-05-13 PROCEDURE — 94761 N-INVAS EAR/PLS OXIMETRY MLT: CPT | Mod: HCNC

## 2025-05-13 RX ORDER — NIFEDIPINE 60 MG/1
60 TABLET, EXTENDED RELEASE ORAL DAILY
Status: DISCONTINUED | OUTPATIENT
Start: 2025-05-14 | End: 2025-05-17 | Stop reason: HOSPADM

## 2025-05-13 RX ORDER — TIZANIDINE 4 MG/1
4 TABLET ORAL 2 TIMES DAILY PRN
Status: DISCONTINUED | OUTPATIENT
Start: 2025-05-13 | End: 2025-05-17 | Stop reason: HOSPADM

## 2025-05-13 RX ORDER — SODIUM BICARBONATE 650 MG/1
650 TABLET ORAL 2 TIMES DAILY
Status: DISCONTINUED | OUTPATIENT
Start: 2025-05-13 | End: 2025-05-17 | Stop reason: HOSPADM

## 2025-05-13 RX ORDER — HYDROCODONE BITARTRATE AND ACETAMINOPHEN 500; 5 MG/1; MG/1
TABLET ORAL
Status: DISCONTINUED | OUTPATIENT
Start: 2025-05-13 | End: 2025-05-17 | Stop reason: HOSPADM

## 2025-05-13 RX ORDER — PREGABALIN 25 MG/1
50 CAPSULE ORAL NIGHTLY
Status: DISCONTINUED | OUTPATIENT
Start: 2025-05-13 | End: 2025-05-17 | Stop reason: HOSPADM

## 2025-05-13 RX ORDER — MORPHINE SULFATE 4 MG/ML
2 INJECTION, SOLUTION INTRAMUSCULAR; INTRAVENOUS ONCE
Refills: 0 | Status: COMPLETED | OUTPATIENT
Start: 2025-05-13 | End: 2025-05-13

## 2025-05-13 RX ORDER — CARVEDILOL 3.12 MG/1
3.12 TABLET ORAL 2 TIMES DAILY WITH MEALS
Status: DISCONTINUED | OUTPATIENT
Start: 2025-05-13 | End: 2025-05-17 | Stop reason: HOSPADM

## 2025-05-13 RX ORDER — PANTOPRAZOLE SODIUM 40 MG/1
40 TABLET, DELAYED RELEASE ORAL DAILY
Status: DISCONTINUED | OUTPATIENT
Start: 2025-05-14 | End: 2025-05-17 | Stop reason: HOSPADM

## 2025-05-13 RX ORDER — SERTRALINE HYDROCHLORIDE 25 MG/1
25 TABLET, FILM COATED ORAL DAILY
Status: DISCONTINUED | OUTPATIENT
Start: 2025-05-13 | End: 2025-05-17 | Stop reason: HOSPADM

## 2025-05-13 RX ORDER — ZOLPIDEM TARTRATE 5 MG/1
5 TABLET ORAL NIGHTLY PRN
Status: DISCONTINUED | OUTPATIENT
Start: 2025-05-13 | End: 2025-05-17 | Stop reason: HOSPADM

## 2025-05-13 RX ORDER — ASPIRIN 81 MG/1
81 TABLET ORAL DAILY
Status: DISCONTINUED | OUTPATIENT
Start: 2025-05-13 | End: 2025-05-17 | Stop reason: HOSPADM

## 2025-05-13 RX ORDER — HYDRALAZINE HYDROCHLORIDE 20 MG/ML
10 INJECTION INTRAMUSCULAR; INTRAVENOUS EVERY 8 HOURS PRN
Status: DISCONTINUED | OUTPATIENT
Start: 2025-05-13 | End: 2025-05-17 | Stop reason: HOSPADM

## 2025-05-13 RX ADMIN — SODIUM CHLORIDE, POTASSIUM CHLORIDE, SODIUM LACTATE AND CALCIUM CHLORIDE: 600; 310; 30; 20 INJECTION, SOLUTION INTRAVENOUS at 05:05

## 2025-05-13 RX ADMIN — SODIUM CHLORIDE, POTASSIUM CHLORIDE, SODIUM LACTATE AND CALCIUM CHLORIDE: 600; 310; 30; 20 INJECTION, SOLUTION INTRAVENOUS at 12:05

## 2025-05-13 RX ADMIN — ZOLPIDEM TARTRATE 5 MG: 5 TABLET ORAL at 09:05

## 2025-05-13 RX ADMIN — ZOLPIDEM TARTRATE 5 MG: 5 TABLET ORAL at 03:05

## 2025-05-13 RX ADMIN — SERTRALINE HYDROCHLORIDE 25 MG: 25 TABLET ORAL at 09:05

## 2025-05-13 RX ADMIN — ACETAMINOPHEN 650 MG: 325 TABLET ORAL at 12:05

## 2025-05-13 RX ADMIN — PREGABALIN 50 MG: 25 CAPSULE ORAL at 09:05

## 2025-05-13 RX ADMIN — SODIUM BICARBONATE 650 MG: 650 TABLET ORAL at 09:05

## 2025-05-13 RX ADMIN — CARVEDILOL 3.12 MG: 3.12 TABLET, FILM COATED ORAL at 09:05

## 2025-05-13 RX ADMIN — HEPARIN SODIUM 5000 UNITS: 5000 INJECTION INTRAVENOUS; SUBCUTANEOUS at 06:05

## 2025-05-13 RX ADMIN — MORPHINE SULFATE 2 MG: 4 INJECTION INTRAVENOUS at 02:05

## 2025-05-13 RX ADMIN — TIZANIDINE 4 MG: 4 TABLET ORAL at 09:05

## 2025-05-13 RX ADMIN — CARVEDILOL 3.12 MG: 3.12 TABLET, FILM COATED ORAL at 04:05

## 2025-05-13 RX ADMIN — TIZANIDINE 4 MG: 4 TABLET ORAL at 02:05

## 2025-05-13 RX ADMIN — PREGABALIN 50 MG: 25 CAPSULE ORAL at 02:05

## 2025-05-13 RX ADMIN — ASPIRIN 81 MG: 81 TABLET, COATED ORAL at 09:05

## 2025-05-13 RX ADMIN — SIMETHICONE 80 MG: 80 TABLET, CHEWABLE ORAL at 03:05

## 2025-05-13 RX ADMIN — HEPARIN SODIUM 5000 UNITS: 5000 INJECTION INTRAVENOUS; SUBCUTANEOUS at 09:05

## 2025-05-13 NOTE — ASSESSMENT & PLAN NOTE
Chronic. Stable. Not in acute exacerbation and currently denies endorsing any suicidal/homicidal ideations.   Plan:  -Continue home medications (zoloft)

## 2025-05-13 NOTE — PLAN OF CARE
[facility] - Telemetry (Hospital)  Initial Discharge Assessment       Primary Care Provider: Elis Wick MD    Admission Diagnosis: Orthostatic hypotension [I95.1]  Weakness [R53.1]  History of heart transplant [Z94.1]  Troponin I above reference range [R79.89]  Chest pain [R07.9]  Acute renal failure superimposed on chronic kidney disease, unspecified acute renal failure type, unspecified CKD stage [N17.9, N18.9]    Admission Date: 5/12/2025  Expected Discharge Date:     Transition of Care Barriers: None    Payor: [employer] MANAGED MEDICARE / Plan: [employer] HMO PPO SPECIAL NEEDS / Product Type: Medicare Advantage /     Extended Emergency Contact Information  Primary Emergency Contact: Moy Watkins  Mobile Phone: [phone]  Relation: Healthcare Power of   Secondary Emergency Contact: JairoSandee interiano  Mobile Phone: [phone]  Relation: Friend    Discharge Plan A: Assisted Living  Discharge Plan B: [facility] - [facility] [address]  [address] [facility] [address]  Phone: [phone] Fax: [phone]    [facility] [address] [place]  [facility] [address]  Phone: [phone] Fax: [phone]      Initial Assessment (most recent)       Adult Discharge Assessment - 05/13/25 0901          Discharge Assessment    Assessment Type Discharge Planning Assessment     Confirmed/corrected address, phone number and insurance Yes     Confirmed Demographics Correct on Facesheet     Source of Information patient     Communicated RATNA with patient/caregiver Date not available/Unable to determine     Reason For Admission Syncope     People in Home alone     Facility Arrived From: [facility]     Do you expect to return to your current living situation? Yes     Do you have help at home or someone to help you manage your care at home? Yes     Prior to hospitilization cognitive status: Alert/Oriented     Current cognitive  status: Alert/Oriented     Walking or Climbing Stairs Difficulty yes     Walking or Climbing Stairs ambulation difficulty, requires equipment     Mobility Management Cane, rolling walker     Dressing/Bathing Difficulty yes     Dressing/Bathing bathing difficulty, requires equipment     Dressing/Bathing Management bath bench     Equipment Currently Used at Home walker, rolling;cane, straight;shower chair     Readmission within 30 days? Yes     Patient currently being followed by outpatient case management? No     If yes, name of outpatient case management following: insurance company assigned oupatient case management     Do you currently have service(s) that help you manage your care at home? Yes     Name and Contact number of agency [facility]     Is the pt/caregiver preference to resume services with current agency Yes     Do you take prescription medications? Yes     Do you have prescription coverage? Yes     Coverage [employer]     Do you have any problems affording any of your prescribed medications? TBD     Is the patient taking medications as prescribed? yes     Who is going to help you get home at discharge? TBD     How do you get to doctors appointments? family or friend will provide     Are you on dialysis? No     Do you take coumadin? No     Discharge Plan A Assisted Living     Discharge Plan B Home Health     DME Needed Upon Discharge  none     Discharge Plan discussed with: Patient     Transition of Care Barriers None                   Anticipated DC disposition: assisted living    Prior level of function: modified independent     PCP: Elis Wick MD    Comments: Patient currently lives at [facility] and is current with [facility]. CM confirmed demographics, emergency contacts, PCP and insurance. Needs will depend on hospital progress; CM following for needs.    Discharge Plan A and Plan B have been determined by review of patient's clinical status, future medical and  therapeutic needs, and coverage/benefits for post-acute care in coordination with multidisciplinary team members.

## 2025-05-13 NOTE — ASSESSMENT & PLAN NOTE
Recent Labs     05/12/25  1934 05/13/25  0441   HGB 9.5* 7.6*   HCT 29.2* 23.4*     No obvious signs of bleeding on exam. Downtrend from previous lab draws, could be contributing to current symptoms  --serial CBC to [facility]  --iron studies, B12, folic acid, FOBT ordered for completion  --low threshold to transfuse for hgb < 7

## 2025-05-13 NOTE — ASSESSMENT & PLAN NOTE
Chronic, controlled.  Latest blood pressure and vitals reviewed-   Temp:  [97 °F (36.1 °C)-97.8 °F (36.6 °C)]   Pulse:  []   Resp:  [17-20]   BP: (101-162)/(52-85)   SpO2:  [94 %-98 %] .   Home meds for hypertension were reviewed and noted below.   Hypertension Medications              carvediloL (COREG) 3.125 MG tablet Take 1 tablet (3.125 mg total) by mouth 2 (two) times daily with meals.    furosemide (LASIX) 40 MG tablet Take 1 tablet (40 mg total) by mouth once daily.    NIFEdipine (PROCARDIA-XL) 60 MG (OSM) 24 hr tablet Take 1 tablet (60 mg total) by mouth once daily.    nitroGLYCERIN (NITROSTAT) 0.4 MG SL tablet PLACE 1 TABLET UNDER THE TONGUE EVERY 5 MINS AS NEEDED FOR CHEST PAIN FOR UP TO 3 DOSES.IF CONTINUED HEART PAIN/PRESSURE AFTER     While in the hospital, will manage blood pressure as follows; Continue home antihypertensive regimen    Will utilize p.r.n. blood pressure medication only if patient's blood pressure greater than  160/90 and she develops symptoms such as worsening chest pain or shortness of breath.

## 2025-05-13 NOTE — PROGRESS NOTES
[facility] Medicine  Progress Note    Patient Name: Nadia Damon  MRN: [id number]  Patient Class: IP- Inpatient   Admission Date: 5/12/2025  Length of Stay: 1 days  Attending Physician: Ra Rowe MD  Primary Care Provider: Elis Wick MD        Subjective     Principal Problem:Syncope        HPI:  Nadia Damon is a 69 y.o. female with a PMH  has a past medical history of Abdominal wall hernia, Abnormal mammogram (10/12/2021), Acute cystitis without hematuria (05/10/2022), Acute ischemic right middle cerebral artery (MCA) stroke (07/20/2024), Adverse drug reaction (11/12/2024), GRACE (acute kidney injury) (11/22/2021), Anxiety, Aphasia (07/19/2024), Arthritis, Breast cancer in female (08/2021), Breast mass, right (11/13/2024), Bronchitis (08/18/2016), C. difficile colitis (11/29/2021), Cellulitis of axilla, left (12/23/2021), Chronic diastolic heart failure (12/16/2021), Chronic kidney disease, Chronic midline low back pain without sciatica (06/18/2018), CKD (chronic kidney disease) stage 4, GFR 15-29 ml/min (04/20/2016), Closed nondisplaced fracture of distal phalanx of left great toe with routine healing (10/22/2018), Coronary artery disease (1993), COVID-19 in immunocompromised patient (02/26/2024), Cystitis (05/10/2022), Depression, Encounter for blood transfusion, Encounter for palliative care (10/14/2024), Fibromyalgia, Heart failure, Heart transplanted (1993), History of hyperparathyroidism; Hyperparathyroidism, secondary renal, Hypertension, Immune disorder, 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), Impaired mobility (07/28/2022), Iron deficiency anemia (08/15/2017), Kidney stones, Obesity, Obesity (BMI 30.0-34.9) (07/22/2019), Other osteoporosis without current pathological fracture (08/30/2019), Other pancytopenia (05/06/2024), Parotitis, acute (09/19/2023), Pharyngitis (01/09/2019), Pneumonia due to infectious organism (03/14/2024), PONV  (postoperative nausea and vomiting), Severe sepsis (11/22/2021), Shingles (2003 approx), Stage 4 chronic kidney disease (11/22/2021), Subclinical hypothyroidism (06/16/2023), Thrombocytopenia, unspecified (11/29/2021), Trouble in sleeping, Unresponsiveness (11/13/2024), and Urinary incontinence.presented to the Emergency Department for evaluation of syncopal event and low bp while at f/u appointment at PCP (Dr. Wick) office. Pt c/o chest pain and soreness which began after undergoing chest compressions on 4/24 due to cardiac arrest. Pt states that she is unsure how long chest compressions lasted. She states that she was sent from [facility] to have back surgery but the procedure was postponed due to concerns of chest pain. After 3 EKGs, the surgeon cancelled the procedure on 4/24.  Pt reports while awaiting for a Lyft, she felt dizzy and light-headed. Pt states that the  of the clinic ran over to catch her and prevented her from hitting her head. Dr. Wick re-evaluated the pt and advised pt to come into the ER. PT confirms that she had a heart transplant years ago. She states that her transplant doctor has been denying her of any further surgeries due to Stage 5 Kidney Failure. She confirms that she is aware of dialysis being her next step. Symptoms are constant and moderate in severity. No mitigating or exacerbating factors reported. No associated sxs included. Patient denies any fever, chills cough, rhinorrhea, blood in stool, melena, or diarrhea. No prior Tx specified. No further complaints or concerns at this time. Dr. Tubbs with Cardiac Transplant team is ok with keeping patient in BR and can obtain cyclosporine level in am with gentle iv hydration.    ER workup revealed CBC to be at baseline with H/H of 9.5/29.2.  CMP revealed BUN/creatinine 45/3.9 with EGFR of 12 (previously 40/2.9 with EGFR 17 on 05/07/2025).  BNP 4 6.  Initial troponin 0.139 with repeat troponin 0.095.  UA with +3 leukocyte  "esterase, 17 WBCs, rare bacteria.  EKG revealed normal sinus rhythm with right bundle-branch block with a ventricular rate of 75 beats per minute and a QT/QTC of 452/504.  Orthostatics negative.  Vital signs stable.  Hospital Medicine consulted to admit patient for gentle IV hydration for GRACE superimposed on CKD and syncope.  Patient in agreement with treatment plan.  Patient admitted under inpatient status.      PCP: Elis Wick      Overview/Hospital Course:  05/13/2025  H&H steadily downtrending, could be dilutional. Iron studies ordered for completion. Orthostatics negative. TSH >12, but has been similarly high in the past. Free T3 and T4 pending. Cardiology evaluation pending. Just her hgb being <8 with her significant heart history is enough to transfuse.  Discussed with Cardiology.  1 unit ordered to be transfused slowly.     Interval history:  See hospital course    Objective:   BP (!) 157/72 (BP Location: Right arm, Patient Position: Lying) Comment (Patient Position): Sort of sitting up  Pulse 86   Temp 98.4 °F (36.9 °C) (Oral)   Resp 20   Ht 5' 2" (1.575 m)   Wt 73.8 kg (162 lb 11.2 oz)   LMP 06/20/1983 (Within Years)   SpO2 97%   BMI 29.76 kg/m²     Intake/Output Summary (Last 24 hours) at 5/13/2025 0743  Last data filed at 5/13/2025 0410  Gross per 24 hour   Intake 250 ml   Output 1700 ml   Net -1450 ml       PHYSICAL EXAM  Vitals reviewed  Constitutional:       General: She is awake. She is not in acute distress.     Appearance: Normal appearance. She is well-developed and well-groomed. She is not ill-appearing, toxic-appearing or diaphoretic.   Cardiovascular:      Rate and Rhythm: Normal rate and regular rhythm.      Heart sounds: Normal heart sounds. No murmur heard.  Pulmonary:      Effort: Pulmonary effort is normal.      Breath sounds: Normal breath sounds. No decreased breath sounds, wheezing, rhonchi or rales.   Abdominal:      General: Bowel sounds are normal.      Palpations: " "Abdomen is soft.      Tenderness: There is no abdominal tenderness. There is no right CVA tenderness, left CVA tenderness, guarding or rebound.   Musculoskeletal:      Cervical back: Normal range of motion and neck supple.      Right lower leg: No edema.      Left lower leg: No edema.      Comments: 5/5 strength throughout   Skin:     General: Skin is warm and dry.      Capillary Refill: Capillary refill takes less than 2 seconds.   Neurological:      General: No focal deficit present.      Mental Status: She is alert and oriented to person, place, and time. Mental status is at baseline.      GCS: GCS eye subscore is 4. GCS verbal subscore is 5. GCS motor subscore is 6.      Cranial Nerves: Cranial nerves 2-12 are intact.      Sensory: Sensation is intact.      Motor: Motor function is intact.   Psychiatric:         Mood and Affect: Mood normal.         Speech: Speech normal.         Behavior: Behavior normal. Behavior is cooperative.     LABS  All labs from the past 24 hours were reviewed.     BMP:   Recent Labs   Lab 05/12/25 1934 05/13/25 0442   GLU 97 104    140   K 4.4 4.2    108   CO2 21* 22*   BUN 45* 43*   CREATININE 3.9* 3.5*   CALCIUM 9.5 8.4*   MG 1.9  --      CBC:   Recent Labs   Lab 05/12/25 1934 05/13/25 0441   WBC 3.80* 3.90   HGB 9.5* 7.6*   HCT 29.2* 23.4*    207     CMP:   Recent Labs   Lab 05/12/25 1934 05/13/25 0442    140   K 4.4 4.2    108   CO2 21* 22*   GLU 97 104   BUN 45* 43*   CREATININE 3.9* 3.5*   CALCIUM 9.5 8.4*   PROT 9.0*  --    ALBUMIN 3.5  --    BILITOT 0.4  --    ALKPHOS 195*  --    AST 36  --    ALT 28  --    ANIONGAP 14 10     Cardiac Markers:   Recent Labs   Lab 05/12/25 1934   *     Coagulation: No results for input(s): "PT", "INR", "APTT" in the last 48 hours.  Lactic Acid: No results for input(s): "LACTATE" in the last 48 hours.  Magnesium:   Recent Labs   Lab 05/12/25 1934   MG 1.9     Troponin:   Recent Labs   Lab " 05/12/25 1934 05/12/25  2236 05/13/25  0200   TROPONINI 0.139* 0.095* 0.106*     TSH:   Recent Labs   Lab 04/25/25  0720   TSH 12.101*     Urine Studies:   Recent Labs   Lab 05/12/25  2000   COLORU Yellow   APPEARANCEUA Hazy*   PHUR 6.0   SPECGRAV 1.010   PROTEINUA 1+*   GLUCUA Negative   BILIRUBINUA Negative   OCCULTUA Negative   NITRITE Negative   UROBILINOGEN Negative   LEUKOCYTESUR 3+*   RBCUA <1   WBCUA 17*   BACTERIA Rare   HYALINECASTS 0       IMAGING  All imaging from the past 24 hours were reviewed.     Imaging Results    None             Assessment & Plan  Syncope  Likely secondary to IV VD/dehydration versus medication side effect.  Orthostatics negative.    Plan:   -general IVFs   -repeat labs in a.m.  -cautious use with home medications close other    Acute kidney injury superimposed on stage 4 chronic kidney disease  GRACE is likely due to pre-renal azotemia due to intravascular volume depletion. Baseline creatinine is 2.9. Most recent creatinine and eGFR are listed below.  Recent Labs     05/12/25 1934 05/13/25  0442   CREATININE 3.9* 3.5*   EGFRNORACEVR 12* 14*      Plan  - GRACE is being treated  - Avoid nephrotoxins and renally dose meds for GFR listed above  - Monitor urine output, serial BMP, and adjust therapy as needed    Elevated troponin  Likely secondary from post cardiac arrest compressions.  Currently on muscle relaxers in narcotics in outpatient setting.  Denies any new or worsening pain.  Plan:   -tele monitoring   -continue analgesics p.r.n.   -continue muscle relaxers p.r.n.  -trend troponin   -EKG as needed  -cardiology consult  Chest pain  Likely secondary from post cardiac arrest compressions.  Currently on muscle relaxers in narcotics in outpatient setting.  Denies any new or worsening pain.  Plan:   -tele monitoring   -continue analgesics p.r.n.   -continue muscle relaxers p.r.n.  -trend troponin   -EKG as needed  -cardiology consult  Coronary artery disease of transplanted heart  with stable angina pectoris  Patient with known CAD s/p heart transplant which is controlled Will continue ASA and monitor for S/Sx of angina/ACS. Continue to monitor on telemetry.  Obtain cyclosporin level in a.m..  Continue home medication.  Essential hypertension  Chronic, controlled.  Latest blood pressure and vitals reviewed-   Temp:  [97 °F (36.1 °C)-98.4 °F (36.9 °C)]   Pulse:  []   Resp:  [17-20]   BP: (101-162)/(52-85)   SpO2:  [94 %-98 %] .   Home meds for hypertension were reviewed and noted below.   Hypertension Medications              carvediloL (COREG) 3.125 MG tablet Take 1 tablet (3.125 mg total) by mouth 2 (two) times daily with meals.    furosemide (LASIX) 40 MG tablet Take 1 tablet (40 mg total) by mouth once daily.    NIFEdipine (PROCARDIA-XL) 60 MG (OSM) 24 hr tablet Take 1 tablet (60 mg total) by mouth once daily.    nitroGLYCERIN (NITROSTAT) 0.4 MG SL tablet PLACE 1 TABLET UNDER THE TONGUE EVERY 5 MINS AS NEEDED FOR CHEST PAIN FOR UP TO 3 DOSES.IF CONTINUED HEART PAIN/PRESSURE AFTER            While in the hospital, will manage blood pressure as follows; Continue home antihypertensive regimen    Will utilize p.r.n. blood pressure medication only if patient's blood pressure greater than  160/90 and she develops symptoms such as worsening chest pain or shortness of breath.    Major depressive disorder, single episode, moderate  Chronic. Stable. Not in acute exacerbation and currently denies endorsing any suicidal/homicidal ideations.   Plan:  -Continue home medications (zoloft)    Gastroesophageal reflux disease without esophagitis  Chronic. Stable. Currently asymptomatic. Home medications include PPI/Antacids as needed.  Plan:  -Continue PPI/Antacids as needed       Fibromyalgia  -continue Lyrica    Normocytic anemia    Recent Labs     05/12/25  1934 05/13/25  0441   HGB 9.5* 7.6*   HCT 29.2* 23.4*     No obvious signs of bleeding on exam. Downtrend from previous lab draws, could be  contributing to current symptoms  --serial CBC to trend h&h  --iron studies, B12, folic acid, FOBT ordered for completion  --low threshold to transfuse for hgb < 7    VTE Risk Mitigation (From admission, onward)           Ordered     heparin (porcine) injection 5,000 Units  Every 8 hours         05/12/25 2146     IP VTE HIGH RISK PATIENT  Once         05/12/25 2146     Place sequential compression device  Until discontinued         05/12/25 2146                    Discharge Planning   RATNA:      Code Status: Partial Code   Medical Readiness for Discharge Date:                            Ra Rowe MD  Department of Hospital Medicine   [facility] [facility] ([facility])

## 2025-05-13 NOTE — SUBJECTIVE & OBJECTIVE
Past Medical History:   Diagnosis Date    Abdominal wall hernia     CT Renal 6/11/2018---Small fat containing superior ventral abdominal wall hernia at the epicardial pacing lead site.    Abnormal mammogram 10/12/2021    Acute cystitis without hematuria 05/10/2022    Acute ischemic right middle cerebral artery (MCA) stroke 07/20/2024    Adverse drug reaction 11/12/2024    GRACE (acute kidney injury) 11/22/2021    Anxiety     Aphasia 07/19/2024    Arthritis     ZEN HIPS    Breast cancer in female 08/2021    LEFT BREAST    Breast mass, right 11/13/2024    RIGHT BREAST MASS (Problem)      Bronchitis 08/18/2016    Never smoked      C. difficile colitis 11/29/2021    Cellulitis of axilla, left 12/23/2021    Chronic diastolic heart failure 12/16/2021    Chronic kidney disease     stage 4, GFR 15-29 ml/min    Chronic midline low back pain without sciatica 06/18/2018    CKD (chronic kidney disease) stage 4, GFR 15-29 ml/min 04/20/2016    US Retro   5/16/2018---Mild cortical thinning and increased cortical echogenicity.  Findings can be seen with medical renal disease.  8/31/216--- Echogenic kidneys with diffuse cortical thinning suggesting  medical renal disease. 2 complex right renal cortical cysts.      Closed nondisplaced fracture of distal phalanx of left great toe with routine healing 10/22/2018    Coronary artery disease 1993    heart transplant    COVID-19 in immunocompromised patient 02/26/2024    Cystitis 05/10/2022    Depression     Encounter for blood transfusion     Encounter for palliative care 10/14/2024    Fibromyalgia     on Lyrica    Heart failure     native heart cardiomyopathy    Heart transplanted 1993    due to cardiomyopathy    History of hyperparathyroidism; Hyperparathyroidism, secondary renal     PT DENIES    Hypertension     Immune disorder     anti rejection meds    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    Impaired mobility 07/28/2022    Iron deficiency anemia 08/15/2017     Kidney stones     passed per pt    Obesity     Obesity (BMI 30.0-34.9) 07/22/2019    Other osteoporosis without current pathological fracture 08/30/2019    Other pancytopenia 05/06/2024    Parotitis, acute 09/19/2023    Pharyngitis 01/09/2019    Pneumonia due to infectious organism 03/14/2024    PONV (postoperative nausea and vomiting)     Severe sepsis 11/22/2021    Shingles 2003 approx    left leg    Stage 4 chronic kidney disease 11/22/2021    Subclinical hypothyroidism 06/16/2023    Thrombocytopenia, unspecified 11/29/2021    Trouble in sleeping     Unresponsiveness 11/13/2024    Urinary incontinence        Past Surgical History:   Procedure Laterality Date    bladder implant Right 06/11/2024    BLADDER SURGERY  2015 approx    mesh - Dr Everett then 2nd reconstructive sx Dr Onofre    BREAST BIOPSY Bilateral     NEGATIVE    BREAST BIOPSY Right 10/31/2022    benign    BREAST LUMPECTOMY Left 2021    BREAST SURGERY Left 09/28/2015    Bx - benign    BREAST SURGERY Right 12/2015    Bx benign    CARDIAC PACEMAKER REMOVAL Left 06/26/2014    Pacer defirillator removed. Put in 1993 aat time of heart transplant    CARPAL TUNNEL RELEASE Left 03/03/2015    Dr. Hall    COLONOSCOPY N/A 02/25/2021    Procedure: COLONOSCOPY;  Surgeon: Freida Ramirez MD;  Location: [facility];  Service: Endoscopy;  Laterality: N/A;    CYSTOCELE REPAIR      Twice with mesh removal    ECHOCARDIOGRAM,TRANSESOPHAGEAL N/A 4/26/2025    Procedure: Transesophageal echo (AYAAN) intra-procedure log documentation;  Surgeon: Barron Chinchilla MD;  Location: [facility] OR [address] [place] [address];  Service: Cardiothoracic;  Laterality: N/A;    EPIDURAL STEROID INJECTION INTO CERVICAL SPINE N/A 02/02/2023    Procedure: T11/T12 IL HELLEN;  Surgeon: Jassi Pierre MD;  Location: [facility] PAIN MGT;  Service: Pain Management;  Laterality: N/A;    EPIDURAL STEROID INJECTION INTO CERVICAL SPINE N/A 9/16/2024    Procedure: T11/T12 IL HELLEN;  Surgeon: Jassi Pierre MD;  Location: [facility]  PAIN MGT;  Service: Pain Management;  Laterality: N/A;    HEART TRANSPLANT  1993    HERNIA REPAIR Right 1971 approx    Inguinal    HYSTERECTOMY  1983    vag hyst /LSO     IMPLANTATION OF PERMANENT SACRAL NERVE STIMULATOR N/A 06/11/2024    Procedure: INSERTION, NEUROSTIMULATOR, PERMANENT, SACRAL;  Surgeon: Sami Cochran MD;  Location: [facility] OR;  Service: Urology;  Laterality: N/A;    INCISION AND DRAINAGE OF ABSCESS Left 12/24/2021    Procedure: INCISION AND DRAINAGE, ABSCESS;  Surgeon: Joseph Longo MD;  Location: [facility] OR;  Service: General;  Laterality: Left;    INJECTION OF ANESTHETIC AGENT AROUND MEDIAL BRANCH NERVES INNERVATING LUMBAR FACET JOINT Right 10/19/2022    Procedure: Right L4/L5 and L5/S1 MBB;  Surgeon: Jassi Pierre MD;  Location: [facility] PAIN MGT;  Service: Pain Management;  Laterality: Right;    INJECTION OF ANESTHETIC AGENT AROUND MEDIAL BRANCH NERVES INNERVATING LUMBAR FACET JOINT Right 11/09/2022    Procedure: Right L4/L5 and L5/S1 MBB;  Surgeon: Jassi Pierre MD;  Location: [facility] PAIN MGT;  Service: Pain Management;  Laterality: Right;    INJECTION OF ANESTHETIC AGENT AROUND MEDIAL BRANCH NERVES INNERVATING LUMBAR FACET JOINT Left 2/6/2025    Procedure: Left L4-5 and L5-S1 MBB #1 with local;  Surgeon: Jassi Pierre MD;  Location: [facility] PAIN MGT;  Service: Pain Management;  Laterality: Left;    INJECTION OF ANESTHETIC AGENT AROUND MEDIAL BRANCH NERVES INNERVATING LUMBAR FACET JOINT Left 3/19/2025    Procedure: Left L4-5 and L5-S1 MBB #2 with local;  Surgeon: Jassi Pierre MD;  Location: [facility] PAIN MGT;  Service: Pain Management;  Laterality: Left;    INJECTION OF ANESTHETIC AGENT INTO SACROILIAC JOINT Right 08/22/2022    Procedure: Right SIJ Injection Right L5/S1 Facte Injection;  Surgeon: Jassi Pierre MD;  Location: [facility] PAIN MGT;  Service: Pain Management;  Laterality: Right;    INSERTION OF TUNNELED CENTRAL VENOUS CATHETER (CVC) WITH SUBCUTANEOUS PORT N/A 11/09/2021     Procedure: [id number];  Surgeon: Christoph Douglas MD;  Location: [facility] OR;  Service: General;  Laterality: N/A;    RADIOFREQUENCY ABLATION Right 12/5/2024    Procedure: Right L4-5 and L5-S1 RFA;  Surgeon: Jassi Pierre MD;  Location: [facility] PAIN MGT;  Service: Pain Management;  Laterality: Right;    RADIOFREQUENCY THERMOCOAGULATION Right 12/07/2022    Procedure: Right L4/L5 and L5/S1 Lumbar RFA;  Surgeon: Jassi Pierre MD;  Location: [facility] PAIN MGT;  Service: Pain Management;  Laterality: Right;    REMOVAL OF ELECTRODE LEAD OF SACRAL NERVE STIMULATOR N/A 2/18/2025    Procedure: REMOVAL, ELECTRODE LEAD, SACRAL NERVE STIMULATOR;  Surgeon: Sami Cochran MD;  Location: [facility] OR;  Service: Urology;  Laterality: N/A;    REMOVAL OF VASCULAR ACCESS PORT      ROBOT-ASSISTED LAPAROSCOPIC ABDOMINAL SACROCOLPOPEXY N/A 08/10/2023    Procedure: ROBOTIC SACROCOLPOPEXY, ABDOMEN;  Surgeon: PRANAY Villalobos MD;  Location: [facility];  Service: OB/GYN;  Laterality: N/A;    ROBOT-ASSISTED LAPAROSCOPIC OOPHORECTOMY Right 08/10/2023    Procedure: ROBOTIC OOPHORECTOMY;  Surgeon: PRANAY Villalobos MD;  Location: [facility] OR;  Service: OB/GYN;  Laterality: Right;    SENTINEL LYMPH NODE BIOPSY Left 10/12/2021    Procedure: BIOPSY, LYMPH NODE, SENTINEL;  Surgeon: Christoph Douglas MD;  Location: [facility] OR;  Service: General;  Laterality: Left;    TOE SURGERY      XI ROBOTIC URETHROPEXY N/A 08/10/2023    Procedure: XI ROBOTIC URETHROPEXY;  Surgeon: PRANAY Villalobos MD;  Location: [facility] OR;  Service: OB/GYN;  Laterality: N/A;       Review of patient's allergies indicates:   Allergen Reactions    Dobutamine Other (See Comments)     Severe Left sided chest pain after dosage administered for Dobutamine Stress Test; itching    Lisinopril Swelling and Rash    Hydrocodone-acetaminophen Nausea Only    Augmentin [amoxicillin-pot clavulanate] Diarrhea    Zyvox [linezolid] Nausea And Vomiting       No current facility-administered  medications on file prior to encounter.     Current Outpatient Medications on File Prior to Encounter   Medication Sig    aspirin (ECOTRIN) 81 MG EC tablet Take 1 tablet (81 mg total) by mouth once daily.    calcitRIOL (ROCALTROL) 0.25 MCG Cap Take 1 capsule (0.25 mcg total) by mouth once daily.    carvediloL (COREG) 3.125 MG tablet Take 1 tablet (3.125 mg total) by mouth 2 (two) times daily with meals.    cycloSPORINE (NEORAL) 100 mg/mL microemulsion solution Take 0.5 mLs (50 mg total) by mouth every 12 (twelve) hours.    furosemide (LASIX) 40 MG tablet Take 1 tablet (40 mg total) by mouth once daily.    NIFEdipine (PROCARDIA-XL) 60 MG (OSM) 24 hr tablet Take 1 tablet (60 mg total) by mouth once daily.    nitroGLYCERIN (NITROSTAT) 0.4 MG SL tablet PLACE 1 TABLET UNDER THE TONGUE EVERY 5 MINS AS NEEDED FOR CHEST PAIN FOR UP TO 3 DOSES.IF CONTINUED HEART PAIN/PRESSURE AFTER    oxyCODONE (ROXICODONE) 5 MG immediate release tablet Take 1 tablet (5 mg total) by mouth every 4 (four) hours as needed for Pain.    pantoprazole (PROTONIX) 40 MG tablet Take 1 tablet (40 mg total) by mouth once daily.    pregabalin (LYRICA) 50 MG capsule Take 1 capsule (50 mg total) by mouth every evening.    sertraline (ZOLOFT) 25 MG tablet Take 1 tablet (25 mg total) by mouth once daily.    sodium bicarbonate 650 MG tablet Take 1 tablet (650 mg total) by mouth 2 (two) times daily.    temazepam (RESTORIL) 30 mg capsule Take 1 capsule (30 mg total) by mouth nightly as needed for Insomnia.    tiZANidine (ZANAFLEX) 2 MG tablet Take 2 tablets (4 mg total) by mouth 2 (two) times a day.    vitamin E 400 UNIT capsule Take 400 Units by mouth once daily.    dextromethorphan-guaiFENesin  mg (MUCINEX DM)  mg per 12 hr tablet Take 1 tablet by mouth 2 (two) times daily. for 10 days    mupirocin (BACTROBAN) 2 % ointment Apply topically 2 (two) times daily.    ondansetron (ZOFRAN) 4 MG tablet Take 1 tablet (4 mg total) by mouth every 12 (twelve)  hours as needed for Nausea.    oxyCODONE-acetaminophen (PERCOCET) 5-325 mg per tablet Take 1 tablet by mouth every 4 (four) hours as needed for Pain.    polyethylene glycol (GLYCOLAX) 17 gram PwPk Take 17 g by mouth once daily.     Family History       Problem Relation (Age of Onset)    Breast cancer Mother, Maternal Grandmother    Cancer Mother (38)    Cataracts Cousin    Heart disease Maternal Grandmother    Hypertension Son          Tobacco Use    Smoking status: Never     Passive exposure: Never    Smokeless tobacco: Never   Substance and Sexual Activity    Alcohol use: Never     Alcohol/week: 0.0 standard drinks of alcohol    Drug use: No    Sexual activity: Not Currently     Partners: Male     Birth control/protection: See Surgical Hx     Review of Systems   Constitutional: Positive for malaise/fatigue.   HENT: Negative.     Eyes: Negative.    Cardiovascular:  Positive for chest pain (atypical) and near-syncope.   Respiratory: Negative.     Endocrine: Negative.    Hematologic/Lymphatic: Negative.    Skin: Negative.    Musculoskeletal:  Positive for arthritis and joint pain.   Gastrointestinal: Negative.    Genitourinary: Negative.    Neurological:  Positive for dizziness and light-headedness.   Psychiatric/Behavioral: Negative.     Allergic/Immunologic: Negative.      Objective:     Vital Signs (Most Recent):  Temp: 98.5 °F (36.9 °C) (05/13/25 0908)  Pulse: 98 (05/13/25 0913)  Resp: 16 (05/13/25 0908)  BP: 138/74 (05/13/25 0908)  SpO2: 96 % (05/13/25 0908) Vital Signs (24h Range):  Temp:  [97 °F (36.1 °C)-98.5 °F (36.9 °C)] 98.5 °F (36.9 °C)  Pulse:  [] 98  Resp:  [16-20] 16  SpO2:  [94 %-98 %] 96 %  BP: (101-162)/(52-85) 138/74     Weight: 73.8 kg (162 lb 11.2 oz)  Body mass index is 29.76 kg/m².    SpO2: 96 %         Intake/Output Summary (Last 24 hours) at 5/13/2025 0178  Last data filed at 5/13/2025 0410  Gross per 24 hour   Intake 250 ml   Output 1700 ml   Net -1450 ml       Lines/Drains/Airways   "     Peripheral Intravenous Line  Duration                  Peripheral IV - Single Lumen 05/12/25 1934 20 G Right Antecubital <1 day                     Physical Exam  Vitals and nursing note reviewed.   Constitutional:       General: She is not in acute distress.     Appearance: Normal appearance. She is well-developed. She is not diaphoretic.   HENT:      Head: Normocephalic and atraumatic.   Eyes:      General:         Right eye: No discharge.         Left eye: No discharge.      Pupils: Pupils are equal, round, and reactive to light.   Cardiovascular:      Rate and Rhythm: Normal rate and regular rhythm.      Heart sounds: Normal heart sounds, S1 normal and S2 normal. No murmur heard.     Comments: Tenderness to chest wall  Pulmonary:      Effort: Pulmonary effort is normal. No respiratory distress.      Breath sounds: Normal breath sounds.   Musculoskeletal:      Right lower leg: No edema.      Left lower leg: No edema.   Skin:     General: Skin is warm and dry.      Findings: No erythema.   Neurological:      Mental Status: She is alert and oriented to person, place, and time.   Psychiatric:         Mood and Affect: Mood normal.         Behavior: Behavior normal.         Thought Content: Thought content normal.          Significant Labs: CMP   Recent Labs   Lab 05/12/25 1934 05/13/25  0442    140   K 4.4 4.2    108   CO2 21* 22*   GLU 97 104   BUN 45* 43*   CREATININE 3.9* 3.5*   CALCIUM 9.5 8.4*   PROT 9.0*  --    ALBUMIN 3.5  --    BILITOT 0.4  --    ALKPHOS 195*  --    AST 36  --    ALT 28  --    ANIONGAP 14 10   , CBC   Recent Labs   Lab 05/12/25 1934 05/13/25  0441   WBC 3.80* 3.90   HGB 9.5* 7.6*   HCT 29.2* 23.4*    207   , Troponin No results for input(s): "TROPONINIHS" in the last 48 hours., and All pertinent lab results from the last 24 hours have been reviewed.    Significant Imaging: Echocardiogram: Transthoracic echo (TTE) complete (Cupid Only):   Results for orders placed or " performed during the hospital encounter of 04/24/25   Echo   Result Value Ref Range    LV Diastolic Volume 71 mL    Echo EF Estimated 40 %    LV Systolic Volume 43 mL    IVS 0.9 0.6 - 1.1 cm    LVIDd 4.0 3.5 - 6.0 cm    LVIDs 3.3 2.1 - 4.0 cm    LVOT diameter 2.0 cm    PW 0.9 0.6 - 1.1 cm    AV LVOT peak gradient 3 mmHg    LVOT mn grad 2 mmHg    LVOT peak jacky 0.9 m/s    LVOT peak VTI 20.6 cm    RV- marie basal diam 3.4 cm    AV valve area 4.3 cm2    AV area by cont VTI 4.3 cm2    AV peak gradient 3 mmHg    AV mean gradient 1 mmHg    Ao peak jacky 0.8 m/s    Ao VTI 14.9 cm    MV Peak A Jacky 0.59 m/s    E wave deceleration time 138 ms    MV Peak E Jacky 0.72 m/s    E/A ratio 1.22     LV LATERAL E/E' RATIO 9.0     LV SEPTAL E/E' RATIO 12.0     TDI LATERAL 0.08 m/s    TDI SEPTAL 0.06 m/s    TV peak gradient 26 mmHg    TR Max Jacky 2.5 m/s    Ascending aorta 2.4 cm    STJ 3.1 cm    Sinus 3.11 cm    TAPSE 0.9 cm    BSA 1.74 m2    LVOT stroke volume 64.7 cm3    LV Systolic Volume Index 25.3 mL/m2    LV Diastolic Volume Index 41.76 mL/m2    LVOT area 3.1 cm2    FS 17.5 (A) 28 - 44 %    Left Ventricle Relative Wall Thickness 0.45 cm    LV mass 109.7 g    LV Mass Index 64.5 g/m2    E/E' ratio 10 m/s    RV/LV Ratio 0.85 cm    AV Velocity Ratio 1.13     AV index (prosthetic) 1.38     PRESLEY by Velocity Ratio 3.5 cm²    Mean e' 0.07 m/s    ZLVIDS 0.95     ZLVIDD -1.67     EF 45 %    TV resting pulmonary artery pressure 28 mmHg    RV TB RVSP 6 mmHg    Est. RA pres 3 mmHg    Narrative      S/P heart transplant 1993.    Left Ventricle: The left ventricle is normal in size. Normal wall   thickness. There is concentric remodeling. There is mildly reduced   systolic function. Ejection fraction is approximately 45%. Mild global   hypokinesis present. Contractility is better preserved in the basal   segements. There is normal diastolic function.    Right Ventricle: The right ventricle is normal in size. Systolic   function is moderately reduced.  TAPSE is 0.9 cm.    Tricuspid Valve: There is mild to moderate regurgitation.    Pulmonary Artery: The estimated pulmonary artery systolic pressure is   28 mmHg.    IVC/SVC: Normal venous pressure at 3 mmHg.     , EKG: Reviewed, and X-Ray: CXR: X-Ray Chest 1 View (CXR): No results found for this visit on 05/12/25. and X-Ray Chest PA and Lateral (CXR): No results found for this visit on 05/12/25.

## 2025-05-13 NOTE — HOSPITAL COURSE
"The patient was first evaluated on 5/14/25 while eating lunch and reported reduced fatigue with mild residual chest tenderness; laboratory results showed Cr 3.1 with post-transfusion H/H improvement, and orthostatic vitals were pending. On 5/15/25 she returned from PT/OT and experienced abrupt 10/10 substernal constriction after straining for a BM--symptoms reminiscent of her February arrest and possibly aggravated by CPR-related rib fractures--yet pain eased with two doses of NTG while VS remained stable. Repeat labs revealed rising Cr 3.6 and H/H 9.0/27.7, and a STAT troponin was sent but was lower than previous lab draw. Cardiology was consulted, advised transfer to the transplant team at the [facility]. Discussed with on call Heart Transplant provider who agreed with the transfer with hospital medicine to accept. Transfer CThe patient was first evaluated on 5/14/25 while eating lunch and reported reduced fatigue with mild residual chest tenderness; laboratory results showed Cr 3.1 with post-transfusion H/H improvement, and orthostatic vitals were pending. On 5/15/25 she returned from PT/OT and experienced abrupt 10/10 substernal constriction after straining for a BM--symptoms reminiscent of her February arrest and possibly aggravated by CPR-related rib fractures--yet pain eased with two doses of NTG while VS remained stable. Repeat labs revealed rising Cr 3.6 and H/H 9.0/27.7, and a STAT troponin was sent but was lower than previous lab draw. Cardiology was consulted, advised transfer to the transplant team at the [facility]. Discussed with on call Heart Transplant provider who agreed with the transfer with hospital medicine to accept. Transfer Center was notified and transfer process was initiated. enter was notified and transfer process was initiated.     /71 (BP Location: Right arm, Patient Position: Lying)   Pulse 94   Temp 97.7 °F (36.5 °C) (Oral)   Resp 18   Ht 5' 2" (1.575 m)   " Wt 62.1 kg (136 lb 14.5 oz)   LMP 06/20/1983 (Within Years)   SpO2 99%   BMI 25.04 kg/m²

## 2025-05-13 NOTE — HPI
Ms. Damon is a 69 year old female patient whose current medical conditions include anemia, leukopenia, breast CA s/p excision and chemo/radiation, cervical spine DJD, and remote history of OHT in 5/1993 with PPM placed at same time who presented to [facility] ED yesterday from primary care clinic due to a near syncopal episode. Patient became lightheaded and dizzy yesterday after her appointment while waiting for her Lyft and eventually fell to the ground. The  at the clinic was able to abort the fall, and patient did not suffer any injury but she continued to feel lethargic and fatigued. Her BP was taken and she was found to be notably hypotensive (90/52 and ? 80/40) and thus EMS was called to transport her to the emergency room. Initial labs revealed troponin of 0.139, GRACE (creatinine of 3.9), BNP of 461, and mild anemia (9.5/29.2), and patient subsequently admitted for further evaluation and treatment. Cardiology consulted to assist with management. Patient seen and examined today, resting in bed. Feels ok. Still weak/tired. Reports she was just recently admitted and discharged from [facility] on 5/6/2025. Suffered a brief cardiac arrest during that admission (rhythm unknown, required 1 round of CPR and 1 round of epi). LHC was deferred at that time given patient's renal function and prior negative Cardiac PET in 11/2024. Patient endorses having ongoing issues with MSK chest wall/soreness since that time. Labs reviewed. Troponin elevated but flat, 0.139>0.095>0.106. EKG without acute ischemic changes. TTE 4/2025 with normal EF. Creatinine improved to 3.5. Orthostatic vitals negative upon admission. H/H dipped to 7.6/23.4 this AM, consider transfusion.

## 2025-05-13 NOTE — ED PROVIDER NOTES
SCRIBE #1 NOTE: I, Luan nAn, am scribing for, and in the presence of, Gulshan Brush Jr., MD. I have scribed the entire note.       History     Chief Complaint   Patient presents with    Fatigue     Per EMS, patient sent from [facility] for evaluation after c/o feeling light-headed and weak     Review of patient's allergies indicates:   Allergen Reactions    Dobutamine Other (See Comments)     Severe Left sided chest pain after dosage administered for Dobutamine Stress Test; itching    Lisinopril Swelling and Rash    Hydrocodone-acetaminophen Nausea Only    Augmentin [amoxicillin-pot clavulanate] Diarrhea    Zyvox [linezolid] Nausea And Vomiting         History of Present Illness     HPI    5/12/2025, 7:30 PM  History obtained from the patient      History of Present Illness: Nadia Damon is a 69 y.o. female patient with a PMHx of HTN, depression, anxiety, obesity, urinary incontinence, heart failure, bronchitis, aphasia, CKD, and PONV who presents to the Emergency Department for evaluation of chest pain and soreness which began after undergoing chest compressions on 4/24 due to cardiac arrest. Pt states that she is unsure how long chest compressions lasted. She states that she was sent from [facility] to have back surgery but the procedure was postponed due to concerns of chest pain. After 3 EKGs, the surgeon cancelled the procedure on 4/24. Pt reports that she had a visit today with her PCP, Dr. Wick. Dr. Wick evaluated the pt as normal and noticed low BP. Dr. Wick sent her home anyway with a Lyft. Pt reports while awaiting the Lyft, she felt dizzy and light-headed. Pt states that the  of the clinic ran over to catch her and prevented her from hitting her head. Dr. Wick re-evaluated the pt and advised pt to come into the ER. PT confirms that she had a heart transplant years ago. She states that her transplant doctor has been denying her of any further surgeries due to Stage 5  Kidney Failure. She confirms that she is aware of dialysis being her next step. Symptoms are constant and moderate in severity. No mitigating or exacerbating factors reported. No associated sxs included. Patient denies any fever, chills cough, rhinorrhea, blood in stool, melena, or diarrhea. No prior Tx specified. No further complaints or concerns at this time.       Arrival mode: Ambulance Service    PCP: Elis Wick MD        Past Medical History:  Past Medical History:   Diagnosis Date    Abdominal wall hernia     CT Renal 6/11/2018---Small fat containing superior ventral abdominal wall hernia at the epicardial pacing lead site.    Abnormal mammogram 10/12/2021    Acute cystitis without hematuria 05/10/2022    Acute ischemic right middle cerebral artery (MCA) stroke 07/20/2024    Adverse drug reaction 11/12/2024    GRACE (acute kidney injury) 11/22/2021    Anxiety     Aphasia 07/19/2024    Arthritis     ZEN HIPS    Breast cancer in female 08/2021    LEFT BREAST    Breast mass, right 11/13/2024    RIGHT BREAST MASS (Problem)      Bronchitis 08/18/2016    Never smoked      C. difficile colitis 11/29/2021    Cellulitis of axilla, left 12/23/2021    Chronic diastolic heart failure 12/16/2021    Chronic kidney disease     stage 4, GFR 15-29 ml/min    Chronic midline low back pain without sciatica 06/18/2018    CKD (chronic kidney disease) stage 4, GFR 15-29 ml/min 04/20/2016    US Retro   5/16/2018---Mild cortical thinning and increased cortical echogenicity.  Findings can be seen with medical renal disease.  8/31/216--- Echogenic kidneys with diffuse cortical thinning suggesting  medical renal disease. 2 complex right renal cortical cysts.      Closed nondisplaced fracture of distal phalanx of left great toe with routine healing 10/22/2018    Coronary artery disease 1993    heart transplant    COVID-19 in immunocompromised patient 02/26/2024    Cystitis 05/10/2022    Depression     Encounter for blood  transfusion     Encounter for palliative care 10/14/2024    Fibromyalgia     on Lyrica    Heart failure     native heart cardiomyopathy    Heart transplanted 1993    due to cardiomyopathy    History of hyperparathyroidism; Hyperparathyroidism, secondary renal     PT DENIES    Hypertension     Immune disorder     anti rejection meds    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    Impaired mobility 07/28/2022    Iron deficiency anemia 08/15/2017    Kidney stones     passed per pt    Obesity     Obesity (BMI 30.0-34.9) 07/22/2019    Other osteoporosis without current pathological fracture 08/30/2019    Other pancytopenia 05/06/2024    Parotitis, acute 09/19/2023    Pharyngitis 01/09/2019    Pneumonia due to infectious organism 03/14/2024    PONV (postoperative nausea and vomiting)     Severe sepsis 11/22/2021    Shingles 2003 approx    left leg    Stage 4 chronic kidney disease 11/22/2021    Subclinical hypothyroidism 06/16/2023    Thrombocytopenia, unspecified 11/29/2021    Trouble in sleeping     Unresponsiveness 11/13/2024    Urinary incontinence        Past Surgical History:  Past Surgical History:   Procedure Laterality Date    bladder implant Right 06/11/2024    BLADDER SURGERY  2015 approx    mesh - Dr Everett then 2nd reconstructive sx Dr Onofre    BREAST BIOPSY Bilateral     NEGATIVE    BREAST BIOPSY Right 10/31/2022    benign    BREAST LUMPECTOMY Left 2021    BREAST SURGERY Left 09/28/2015    Bx - benign    BREAST SURGERY Right 12/2015    Bx benign    CARDIAC PACEMAKER REMOVAL Left 06/26/2014    Pacer defirillator removed. Put in 1993 aat time of heart transplant    CARPAL TUNNEL RELEASE Left 03/03/2015    Dr. Hall    COLONOSCOPY N/A 02/25/2021    Procedure: COLONOSCOPY;  Surgeon: Freida Ramirez MD;  Location: [facility];  Service: Endoscopy;  Laterality: N/A;    CYSTOCELE REPAIR      Twice with mesh removal    ECHOCARDIOGRAM,TRANSESOPHAGEAL N/A 4/26/2025    Procedure: Transesophageal  echo (AYAAN) intra-procedure log documentation;  Surgeon: Barron Chinchilla MD;  [facility];  Service: Cardiothoracic;  Laterality: N/A;    EPIDURAL STEROID INJECTION INTO CERVICAL SPINE N/A 02/02/2023    Procedure: T11/T12 IL HELLEN;  Surgeon: Jassi Pierre MD;  Location: [facility];  Service: Pain Management;  Laterality: N/A;    EPIDURAL STEROID INJECTION INTO CERVICAL SPINE N/A 9/16/2024    Procedure: T11/T12 IL HELLEN;  Surgeon: Jassi Pierre MD;  Location: [facility];  Service: Pain Management;  Laterality: N/A;    HEART TRANSPLANT  1993    HERNIA REPAIR Right 1971 approx    Inguinal    HYSTERECTOMY  1983    vag hyst /LSO     IMPLANTATION OF PERMANENT SACRAL NERVE STIMULATOR N/A 06/11/2024    Procedure: INSERTION, NEUROSTIMULATOR, PERMANENT, SACRAL;  Surgeon: Sami Cochran MD;  Location: [facility] OR;  Service: Urology;  Laterality: N/A;    INCISION AND DRAINAGE OF ABSCESS Left 12/24/2021    Procedure: INCISION AND DRAINAGE, ABSCESS;  Surgeon: Joseph Longo MD;  Location: [facility] OR;  Service: General;  Laterality: Left;    INJECTION OF ANESTHETIC AGENT AROUND MEDIAL BRANCH NERVES INNERVATING LUMBAR FACET JOINT Right 10/19/2022    Procedure: Right L4/L5 and L5/S1 MBB;  Surgeon: Jassi Pierre MD;  Location: [facility];  Service: Pain Management;  Laterality: Right;    INJECTION OF ANESTHETIC AGENT AROUND MEDIAL BRANCH NERVES INNERVATING LUMBAR FACET JOINT Right 11/09/2022    Procedure: Right L4/L5 and L5/S1 MBB;  Surgeon: Jassi Pierre MD;  Location: [facility];  Service: Pain Management;  Laterality: Right;    INJECTION OF ANESTHETIC AGENT AROUND MEDIAL BRANCH NERVES INNERVATING LUMBAR FACET JOINT Left 2/6/2025    Procedure: Left L4-5 and L5-S1 MBB #1 with local;  Surgeon: Jassi Pierre MD;  Location: [facility];  Service: Pain Management;  Laterality: Left;    INJECTION OF ANESTHETIC AGENT AROUND MEDIAL BRANCH NERVES INNERVATING LUMBAR FACET JOINT Left  3/19/2025    Procedure: Left L4-5 and L5-S1 MBB #2 with local;  Surgeon: Jassi Pierre MD;  Location: [facility];  Service: Pain Management;  Laterality: Left;    INJECTION OF ANESTHETIC AGENT INTO SACROILIAC JOINT Right 08/22/2022    Procedure: Right SIJ Injection Right L5/S1 Facte Injection;  Surgeon: Jassi Pierre MD;  [facility];  Service: Pain Management;  Laterality: Right;    INSERTION OF TUNNELED CENTRAL VENOUS CATHETER (CVC) WITH SUBCUTANEOUS PORT N/A 11/09/2021    Procedure: [id number]-CATH;  Surgeon: Christoph Douglas MD;  Location: [facility] OR;  Service: General;  Laterality: N/A;    RADIOFREQUENCY ABLATION Right 12/5/2024    Procedure: Right L4-5 and L5-S1 RFA;  Surgeon: Jassi Pierre MD;  Location: [facility];  Service: Pain Management;  Laterality: Right;    RADIOFREQUENCY THERMOCOAGULATION Right 12/07/2022    Procedure: Right L4/L5 and L5/S1 Lumbar RFA;  Surgeon: Jassi Pierre MD;  [facility];  Service: Pain Management;  Laterality: Right;    REMOVAL OF ELECTRODE LEAD OF SACRAL NERVE STIMULATOR N/A 2/18/2025    Procedure: REMOVAL, ELECTRODE LEAD, SACRAL NERVE STIMULATOR;  Surgeon: Sami Cochran MD;  Location: [facility] OR;  Service: Urology;  Laterality: N/A;    REMOVAL OF VASCULAR ACCESS PORT      ROBOT-ASSISTED LAPAROSCOPIC ABDOMINAL SACROCOLPOPEXY N/A 08/10/2023    Procedure: ROBOTIC SACROCOLPOPEXY, ABDOMEN;  Surgeon: PRANAY Villalobos MD;  Location: [facility] OR;  Service: OB/GYN;  Laterality: N/A;    ROBOT-ASSISTED LAPAROSCOPIC OOPHORECTOMY Right 08/10/2023    Procedure: ROBOTIC OOPHORECTOMY;  Surgeon: PRANAY Villalobos MD;  Location: [facility] OR;  Service: OB/GYN;  Laterality: Right;    SENTINEL LYMPH NODE BIOPSY Left 10/12/2021    Procedure: BIOPSY, LYMPH NODE, SENTINEL;  Surgeon: Christoph Douglas MD;  Location: [facility] OR;  Service: General;  Laterality: Left;    TOE SURGERY      XI ROBOTIC URETHROPEXY N/A 08/10/2023    Procedure: XI ROBOTIC  URETHROPEXY;  Surgeon: PRANAY Villalobos MD;  Location: [facility] OR;  Service: OB/GYN;  Laterality: N/A;         Family History:  Family History   Problem Relation Name Age of Onset    Cancer Mother  38        breast    Breast cancer Mother      Breast cancer Maternal Grandmother      Heart disease Maternal Grandmother      Hypertension Son      Cataracts Cousin      Diabetes Neg Hx      Stroke Neg Hx      Kidney disease Neg Hx      Asthma Neg Hx      COPD Neg Hx      Melanoma Neg Hx      Hyperlipidemia Neg Hx         Social History:  Social History     Tobacco Use    Smoking status: Never     Passive exposure: Never    Smokeless tobacco: Never   Substance and Sexual Activity    Alcohol use: Never     Alcohol/week: 0.0 standard drinks of alcohol    Drug use: No    Sexual activity: Not Currently     Partners: Male     Birth control/protection: See Surgical Hx        Review of Systems     Review of Systems   Constitutional:  Negative for chills and fever.   HENT:  Negative for rhinorrhea and sore throat.    Respiratory:  Negative for cough and shortness of breath.    Cardiovascular:  Positive for chest pain.   Gastrointestinal:  Negative for blood in stool, diarrhea and nausea.        (-) melena   Genitourinary:  Negative for dysuria.   Musculoskeletal:  Negative for back pain.   Skin:  Negative for rash.   Neurological:  Negative for weakness.   Hematological:  Does not bruise/bleed easily.   All other systems reviewed and are negative.     Physical Exam     Initial Vitals [05/12/25 1814]   BP Pulse Resp Temp SpO2   (!) 101/54 (!) 54 20 97 °F (36.1 °C) 97 %      MAP       --          Physical Exam  Nursing Notes and Vital Signs Reviewed.  Constitutional: Patient is in no acute distress. Well-developed and well-nourished.  Head: Atraumatic. Normocephalic.  Eyes:  EOM intact.  No scleral icterus.  ENT: Mucous membranes are moist.  Nares clear   Neck:  Full ROM. No JVD.  Cardiovascular: Regular rate. Regular rhythm No  murmurs, rubs, or gallops. Distal pulses are 2+ and symmetric  Pulmonary/Chest: No respiratory distress. Clear to auscultation bilaterally. No wheezing or rales.  Equal chest wall rise bilaterally  Abdominal: Soft and non-distended.  There is no tenderness.  No rebound, guarding, or rigidity. Good bowel sounds.  Genitourinary: No CVA tenderness.  No suprapubic tenderness  Musculoskeletal: Moves all extremities. No obvious deformities.  5 x 5 strength in all extremities   Skin: Warm and dry.  Neurological:  Alert, awake, and appropriate.  Normal speech.  No acute focal neurological deficits are appreciated.  Two through 12 intact bilaterally.  Psychiatric: Normal affect. Good eye contact. Appropriate in content.       ED Course   Critical Care    Date/Time: 5/12/2025 9:20 PM    Performed by: Gulshan Brush Jr., MD  Authorized by: Gulshan Brush Jr., MD  Direct patient critical care time: 12 minutes  Additional history critical care time: 14 minutes  Ordering / reviewing critical care time: 8 minutes  Documentation critical care time: 9 minutes  Consulting other physicians critical care time: 7 minutes  Consult with family critical care time: 5 minutes  Total critical care time (exclusive of procedural time) : 55 minutes  Critical care time was exclusive of separately billable procedures and treating other patients and teaching time.  Critical care was necessary to treat or prevent imminent or life-threatening deterioration of the following conditions: cardiac failure, circulatory failure and renal failure.  Critical care was time spent personally by me on the following activities: development of treatment plan with patient or surrogate, discussions with consultants, interpretation of cardiac output measurements, examination of patient, evaluation of patient's response to treatment, obtaining history from patient or surrogate, ordering and performing treatments and interventions, ordering and review of  "laboratory studies, pulse oximetry, re-evaluation of patient's condition, ordering and review of radiographic studies and review of old charts.        ED Vital Signs:  Vitals:    05/12/25 1814 05/12/25 1921 05/12/25 2015 05/12/25 2018   BP: (!) 101/54 122/69 118/64 125/75   Pulse: (!) 54 78 80 81   Resp: 20      Temp: 97 °F (36.1 °C)      TempSrc: Oral      SpO2: 97% 98% 98% 98%   Height: 5' 2" (1.575 m)       05/12/25 2020 05/12/25 2120   BP: 114/65    Pulse: 83    Resp:  17   Temp:     TempSrc:     SpO2: 97%    Height:         Abnormal Lab Results:  Labs Reviewed   B-TYPE NATRIURETIC PEPTIDE - Abnormal       Result Value     (*)    COMPREHENSIVE METABOLIC PANEL - Abnormal    Sodium 137      Potassium 4.4      Chloride 102      CO2 21 (*)     Glucose 97      BUN 45 (*)     Creatinine 3.9 (*)     Calcium 9.5      Protein Total 9.0 (*)     Albumin 3.5      Bilirubin Total 0.4       (*)     AST 36      ALT 28      Anion Gap 14      eGFR 12 (*)    TROPONIN I - Abnormal    Troponin-I 0.139 (*)    URINALYSIS, REFLEX TO URINE CULTURE - Abnormal    Color, UA Yellow      Appearance, UA Hazy (*)     pH, UA 6.0      Spec Grav UA 1.010      Protein, UA 1+ (*)     Glucose, UA Negative      Ketones, UA Negative      Bilirubin, UA Negative      Blood, UA Negative      Nitrites, UA Negative      Urobilinogen, UA Negative      Leukocyte Esterase, UA 3+ (*)    CBC WITH DIFFERENTIAL - Abnormal    WBC 3.80 (*)     RBC 3.29 (*)     HGB 9.5 (*)     HCT 29.2 (*)     MCV 89      MCH 28.9      MCHC 32.5      RDW 15.5 (*)     Platelet Count 238      MPV 9.3      Nucleated RBC 0      Neut % 60.8      Lymph % 25.0      Mono % 10.0      Eos % 3.4      Basophil % 0.3      Imm Grans % 0.5      Neut # 2.31      Lymph # 0.95 (*)     Mono # 0.38      Eos # 0.13      Baso # 0.01      Imm Grans # 0.02     URINALYSIS MICROSCOPIC - Abnormal    RBC, UA <1      WBC, UA 17 (*)     WBC Clumps, UA None      Bacteria, UA Rare      Yeast, UA " None      Squamous Epithelial Cells, UA 8      Non-Squamous Epithelial Cells 1 (*)     Hyaline Casts, UA 0      Epithelial Casts 0      WBC Casts 0      RBC Casts 0      Granular Casts 0      Fatty Casts, UA 0      Waxy Casts, UA 0      Triple Phosphate Crystals, UA None      Calcium Oxalate Crystals, UA None      Calcium Phosphate Crystal None      Calcium Carbonate Crystal None      Ammonium Urate Crystals None      Uric Acid Crystals, UA None      Amorphous, UA None      Unclassified Crystals None      Trichomonas, UA None      Other, UA None      Microscopic Comment       MAGNESIUM - Normal    Magnesium  1.9     CULTURE, URINE   CBC W/ AUTO DIFFERENTIAL    Narrative:     The following orders were created for panel order CBC auto differential.  Procedure                               Abnormality         Status                     ---------                               -----------         ------                     CBC with Differential[[id number]]       Abnormal            Final result                 Please view results for these tests on the individual orders.   EXTRA TUBES    Narrative:     The following orders were created for panel order EXTRA TUBES.  Procedure                               Abnormality         Status                     ---------                               -----------         ------                     Light Blue Top Hold[[id number]]                             Final result               Gold Top Hold[[id number]]                                   Final result                 Please view results for these tests on the individual orders.   LIGHT BLUE TOP HOLD    Extra Tube Hold for add-ons.     GOLD TOP HOLD    Extra Tube Hold for add-ons.     GREY TOP URINE HOLD        All Lab Results:  Results for orders placed or performed during the hospital encounter of 05/12/25   Brain natriuretic peptide    Collection Time: 05/12/25  7:34 PM   Result Value Ref Range     (H) 0 - 99 pg/mL    Comprehensive metabolic panel    Collection Time: 05/12/25  7:34 PM   Result Value Ref Range    Sodium 137 136 - 145 mmol/L    Potassium 4.4 3.5 - 5.1 mmol/L    Chloride 102 95 - 110 mmol/L    CO2 21 (L) 23 - 29 mmol/L    Glucose 97 70 - 110 mg/dL    BUN 45 (H) 8 - 23 mg/dL    Creatinine 3.9 (H) 0.5 - 1.4 mg/dL    Calcium 9.5 8.7 - 10.5 mg/dL    Protein Total 9.0 (H) 6.0 - 8.4 gm/dL    Albumin 3.5 3.5 - 5.2 g/dL    Bilirubin Total 0.4 0.1 - 1.0 mg/dL     (H) 40 - 150 unit/L    AST 36 11 - 45 unit/L    ALT 28 10 - 44 unit/L    Anion Gap 14 8 - 16 mmol/L    eGFR 12 (L) >60 mL/min/1.73/m2   Troponin I    Collection Time: 05/12/25  7:34 PM   Result Value Ref Range    Troponin-I 0.139 (H) <=0.026 ng/mL   Magnesium    Collection Time: 05/12/25  7:34 PM   Result Value Ref Range    Magnesium  1.9 1.6 - 2.6 mg/dL   CBC with Differential    Collection Time: 05/12/25  7:34 PM   Result Value Ref Range    WBC 3.80 (L) 3.90 - 12.70 K/uL    RBC 3.29 (L) 4.00 - 5.40 M/uL    HGB 9.5 (L) 12.0 - 16.0 gm/dL    HCT 29.2 (L) 37.0 - 48.5 %    MCV 89 82 - 98 fL    MCH 28.9 27.0 - 31.0 pg    MCHC 32.5 32.0 - 36.0 g/dL    RDW 15.5 (H) 11.5 - 14.5 %    Platelet Count 238 150 - 450 K/uL    MPV 9.3 9.2 - 12.9 fL    Nucleated RBC 0 <=0 /100 WBC    Neut % 60.8 38 - 73 %    Lymph % 25.0 18 - 48 %    Mono % 10.0 4 - 15 %    Eos % 3.4 <=8 %    Basophil % 0.3 <=1.9 %    Imm Grans % 0.5 0.0 - 0.5 %    Neut # 2.31 1.8 - 7.7 K/uL    Lymph # 0.95 (L) 1 - 4.8 K/uL    Mono # 0.38 0.3 - 1 K/uL    Eos # 0.13 <=0.5 K/uL    Baso # 0.01 <=0.2 K/uL    Imm Grans # 0.02 0.00 - 0.04 K/uL   Light Blue Top Hold    Collection Time: 05/12/25  7:37 PM   Result Value Ref Range    Extra Tube Hold for add-ons.    Gold Top Hold    Collection Time: 05/12/25  7:37 PM   Result Value Ref Range    Extra Tube Hold for add-ons.    Urinalysis, Reflex to Urine Culture Urine, Clean Catch    Collection Time: 05/12/25  8:00 PM    Specimen: Urine, Clean Catch   Result Value Ref  Range    Color, UA Yellow Straw, [place], Yellow, Light-Orange    Appearance, UA Hazy (A) Clear    pH, UA 6.0 5.0 - 8.0    Spec Grav UA 1.010 1.005 - 1.030    Protein, UA 1+ (A) Negative    Glucose, UA Negative Negative    Ketones, UA Negative Negative    Bilirubin, UA Negative Negative    Blood, UA Negative Negative    Nitrites, UA Negative Negative    Urobilinogen, UA Negative <2.0 EU/dL    Leukocyte Esterase, UA 3+ (A) Negative   Urinalysis Microscopic    Collection Time: 05/12/25  8:00 PM   Result Value Ref Range    RBC, UA <1 0 - 4 /HPF    WBC, UA 17 (H) 0 - 5 /HPF    WBC Clumps, UA None None, Rare    Bacteria, UA Rare None, Rare, Occasional /HPF    Yeast, UA None None /HPF    Squamous Epithelial Cells, UA 8 /HPF    Non-Squamous Epithelial Cells 1 (H) <=0 /HPF    Hyaline Casts, UA 0 0 - 1 /LPF    Epithelial Casts 0 None /lpf    WBC Casts 0 None /LPF    RBC Casts 0 None /LPF    Granular Casts 0 None  /LPF    Fatty Casts, UA 0 None /LPF    Waxy Casts, UA 0 None /lpf    Triple Phosphate Crystals, UA None None, Rare, Occasional, Few, Moderate    Calcium Oxalate Crystals, UA None None, Rare, Occasional, Few, Moderate    Calcium Phosphate Crystal None Rare, None, Moderate, Few, Occasional    Calcium Carbonate Crystal None Occasional, Rare, Few, None, Moderate    Ammonium Urate Crystals None Moderate, Occasional, Rare, Few, None    Uric Acid Crystals, UA None None, Rare, Occasional, Few, Moderate    Amorphous, UA None None, Occasional, Few, Moderate, Rare    Unclassified Crystals None None, Rare, Occasional, Few, Moderate    Trichomonas, UA None None /HPF    Other, UA None None    Microscopic Comment       *Note: Due to a large number of results and/or encounters for the requested time period, some results have not been displayed. A complete set of results can be found in Results Review.       Imaging Results:  Imaging Results    None          The EKG was ordered, reviewed, and independently interpreted by the ED  provider.  Interpretation time: 19:17  Rate: 75 BPM  Rhythm: normal sinus rhythm  Interpretation: Left axis deviation. Right bundle branch block. Minimal voltage criteria for LVH, may be normal variant (R in aVL). Marked T wave abnormality, consider lateral ischemia. No STEMI.           The Emergency Provider reviewed the vital signs and test results, which are outlined above.     ED Discussion     8:57 PM: Discussed pt's case with Courtney Tubbs MD (Cardiology) who states I am ok with her getting gentle hydration there since it sounds like yall are comfortable, if it ends up being a longer than 24-48 hr stay we can bring her over.    9:02 PM: Discussed case with Shamar Galdamez MD ([facility] Medicine). Dr. Galdamez agrees with current care and management of pt and accepts admission.   Admitting Service: [facility]  Admitting Physician: Dr. Galdamez  Admit to: Med/Tele/Inpatient    9:02 PM: Re-evaluated pt. I have discussed test results, shared treatment plan, and the need for admission with patient/family/caretaker at bedside. Pt and/or family/caretaker express understanding at this time and agree with all information. All questions answered. Pt/caretaker/family member(s) have no further questions or concerns at this time. Pt is ready for admit.       Medical Decision Making  Differential diagnosis:  Hypotension, dehydration, orthostatic syncope, chest pain, ASCVD, cardiac ischemia    Patient with a heart transplant in his well as chronic renal failure with recent cardiac arrest in [place] presenting with hypotension from primary care.  That has improved quickly with fluids she is clinically orthostatic.  She has a elevated troponin continued chest pain post chest compressions.  This is appears to be musculoskeletal.  Consultation with the hospitalist here in the transplant team the patient is being admitted for IV fluids and trending of enzymes.  I did discuss the case with the son in his very  concerned.    Amount and/or Complexity of Data Reviewed  Independent Historian: friend     Details: I spoke with the patient's son in [place] who was very concerned about his mother's care.  External Data Reviewed: labs and notes.  Labs: ordered. Decision-making details documented in ED Course.     Details: Troponin mildly elevated I 0.139 with a BNP of 406.  She has worsening renal function with a BUN of 45 creatinine of 3.9 but otherwise benign.  UA was negative.  The patient is orthostatic positive  ECG/medicine tests: ordered and independent interpretation performed. Decision-making details documented in ED Course.     Details: No STEMI  Discussion of management or test interpretation with external provider(s): That has spoke with Dr. Tubbs with the transplant team via secure chat.  She is okay with the admission here for trending of enzymes and IV fluids.  Dr. NAYE aquino  accepted    Risk  OTC drugs.  Prescription drug management.  Decision regarding hospitalization.  Diagnosis or treatment significantly limited by social determinants of health.  Risk Details: Social determinants: Patient with a heart transplant    Critical Care  Total time providing critical care: 55 minutes                ED Medication(s):  Medications   sodium chloride 0.9% bolus 250 mL 250 mL (0 mLs Intravenous Stopped 5/12/25 2120)   oxyCODONE immediate release tablet 5 mg (5 mg Oral Given 5/12/25 2120)       New Prescriptions    No medications on file               Scribe Attestation:   Scribe #1: I performed the above scribed service and the documentation accurately describes the services I performed. I attest to the accuracy of the note.     Attending:   Physician Attestation Statement for Scribe #1: I, Gulshan Brush Jr., MD, personally performed the services described in this documentation, as scribed by Luan Ann, in my presence, and it is both accurate and complete.           Clinical Impression       ICD-10-CM ICD-9-CM   1.  Orthostatic hypotension  I95.1 458.0   2. Weakness  R53.1 780.79   3. Acute renal failure superimposed on chronic kidney disease, unspecified acute renal failure type, unspecified CKD stage  N17.9 584.9    N18.9 585.9   4. Troponin I above reference range  R79.89 790.6   5. History of heart transplant  Z94.1 V42.1       Disposition:   Disposition: Admitted  Condition: Gulshan Cadet Jr., MD  05/12/25 2121       Gulshan Brush Jr., MD  05/12/25 2136

## 2025-05-13 NOTE — ASSESSMENT & PLAN NOTE
Patient with known CAD s/p heart transplant which is controlled Will continue ASA and monitor for S/Sx of angina/ACS. Continue to monitor on telemetry.  Obtain cyclosporin level in a.m..  Continue home medication.

## 2025-05-13 NOTE — ASSESSMENT & PLAN NOTE
-Troponin 0.139>0.095>0.106  -Elevation likely secondary to demand ischemia from GRACE, hypotension, anemia  -Recent TTE 4/2025 with normal EF  -Prior Cardiac PET 11/2024 negative for ischemia/scar  -CP on exam is atypical and reproducible  -Continue OMT as tolerated

## 2025-05-13 NOTE — ASSESSMENT & PLAN NOTE
Likely secondary to IV VD/dehydration versus medication side effect.  Orthostatics negative.    Plan:   -general IVFs   -repeat labs in a.m.  -cautious use with home medications close other

## 2025-05-13 NOTE — PLAN OF CARE
Nutrition Recommendations/Interventions 5/13/25:   1. Recommend pt continues on a Heart healhty, 2000 mL fluid restriction diet   2. Recommend [place] BID to assist filling nutritional gaps   3. Encourage PO and supplement intake, recommend feeding assistane as warranted   4. Recommend a bowel regimen as warranted   5. Weigh twice weekly  6. Collaboration by nutrition professional with other providers    Goals:   1. Pt will tolerate and consume >75% EEN and EPN prior to RD follow up   2. Pt has a BM prior to RD follow up    Viv George, FADUMO, RDN, LDN

## 2025-05-13 NOTE — SUBJECTIVE & OBJECTIVE
Past Medical History:   Diagnosis Date    Abdominal wall hernia     CT Renal 6/11/2018---Small fat containing superior ventral abdominal wall hernia at the epicardial pacing lead site.    Abnormal mammogram 10/12/2021    Acute cystitis without hematuria 05/10/2022    Acute ischemic right middle cerebral artery (MCA) stroke 07/20/2024    Adverse drug reaction 11/12/2024    GRACE (acute kidney injury) 11/22/2021    Anxiety     Aphasia 07/19/2024    Arthritis     ZEN HIPS    Breast cancer in female 08/2021    LEFT BREAST    Breast mass, right 11/13/2024    RIGHT BREAST MASS (Problem)      Bronchitis 08/18/2016    Never smoked      C. difficile colitis 11/29/2021    Cellulitis of axilla, left 12/23/2021    Chronic diastolic heart failure 12/16/2021    Chronic kidney disease     stage 4, GFR 15-29 ml/min    Chronic midline low back pain without sciatica 06/18/2018    CKD (chronic kidney disease) stage 4, GFR 15-29 ml/min 04/20/2016    US Retro   5/16/2018---Mild cortical thinning and increased cortical echogenicity.  Findings can be seen with medical renal disease.  8/31/216--- Echogenic kidneys with diffuse cortical thinning suggesting  medical renal disease. 2 complex right renal cortical cysts.      Closed nondisplaced fracture of distal phalanx of left great toe with routine healing 10/22/2018    Coronary artery disease 1993    heart transplant    COVID-19 in immunocompromised patient 02/26/2024    Cystitis 05/10/2022    Depression     Encounter for blood transfusion     Encounter for palliative care 10/14/2024    Fibromyalgia     on Lyrica    Heart failure     native heart cardiomyopathy    Heart transplanted 1993    due to cardiomyopathy    History of hyperparathyroidism; Hyperparathyroidism, secondary renal     PT DENIES    Hypertension     Immune disorder     anti rejection meds    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    Impaired mobility 07/28/2022    Iron deficiency anemia 08/15/2017     Kidney stones     passed per pt    Obesity     Obesity (BMI 30.0-34.9) 07/22/2019    Other osteoporosis without current pathological fracture 08/30/2019    Other pancytopenia 05/06/2024    Parotitis, acute 09/19/2023    Pharyngitis 01/09/2019    Pneumonia due to infectious organism 03/14/2024    PONV (postoperative nausea and vomiting)     Severe sepsis 11/22/2021    Shingles 2003 approx    left leg    Stage 4 chronic kidney disease 11/22/2021    Subclinical hypothyroidism 06/16/2023    Thrombocytopenia, unspecified 11/29/2021    Trouble in sleeping     Unresponsiveness 11/13/2024    Urinary incontinence        Past Surgical History:   Procedure Laterality Date    bladder implant Right 06/11/2024    BLADDER SURGERY  2015 approx    mesh - Dr Everett then 2nd reconstructive sx Dr Onofre    BREAST BIOPSY Bilateral     NEGATIVE    BREAST BIOPSY Right 10/31/2022    benign    BREAST LUMPECTOMY Left 2021    BREAST SURGERY Left 09/28/2015    Bx - benign    BREAST SURGERY Right 12/2015    Bx benign    CARDIAC PACEMAKER REMOVAL Left 06/26/2014    Pacer defirillator removed. Put in 1993 aat time of heart transplant    CARPAL TUNNEL RELEASE Left 03/03/2015    Dr. Hall    COLONOSCOPY N/A 02/25/2021    Procedure: COLONOSCOPY;  Surgeon: Freida Ramirez MD;  Location: [facility];  Service: Endoscopy;  Laterality: N/A;    CYSTOCELE REPAIR      Twice with mesh removal    ECHOCARDIOGRAM,TRANSESOPHAGEAL N/A 4/26/2025    Procedure: Transesophageal echo (AYAAN) intra-procedure log documentation;  Surgeon: Barron Chinchilla MD;  Location: [facility] OR [address] [place] [address];  Service: Cardiothoracic;  Laterality: N/A;    EPIDURAL STEROID INJECTION INTO CERVICAL SPINE N/A 02/02/2023    Procedure: T11/T12 IL HELLEN;  Surgeon: Jassi Pierre MD;  Location: [facility] PAIN MGT;  Service: Pain Management;  Laterality: N/A;    EPIDURAL STEROID INJECTION INTO CERVICAL SPINE N/A 9/16/2024    Procedure: T11/T12 IL HELLEN;  Surgeon: Jassi Pierre MD;  Location: [facility]  PAIN MGT;  Service: Pain Management;  Laterality: N/A;    HEART TRANSPLANT  1993    HERNIA REPAIR Right 1971 approx    Inguinal    HYSTERECTOMY  1983    vag hyst /LSO     IMPLANTATION OF PERMANENT SACRAL NERVE STIMULATOR N/A 06/11/2024    Procedure: INSERTION, NEUROSTIMULATOR, PERMANENT, SACRAL;  Surgeon: Sami Cochran MD;  Location: [facility] [place] OR;  Service: Urology;  Laterality: N/A;    INCISION AND DRAINAGE OF ABSCESS Left 12/24/2021    Procedure: INCISION AND DRAINAGE, ABSCESS;  Surgeon: Joseph Longo MD;  Location: [facility] [place] OR;  Service: General;  Laterality: Left;    INJECTION OF ANESTHETIC AGENT AROUND MEDIAL BRANCH NERVES INNERVATING LUMBAR FACET JOINT Right 10/19/2022    Procedure: Right L4/L5 and L5/S1 MBB;  Surgeon: Jassi Pierre MD;  Location: [facility] PAIN MGT;  Service: Pain Management;  Laterality: Right;    INJECTION OF ANESTHETIC AGENT AROUND MEDIAL BRANCH NERVES INNERVATING LUMBAR FACET JOINT Right 11/09/2022    Procedure: Right L4/L5 and L5/S1 MBB;  Surgeon: Jassi Pierre MD;  Location: [facility] PAIN MGT;  Service: Pain Management;  Laterality: Right;    INJECTION OF ANESTHETIC AGENT AROUND MEDIAL BRANCH NERVES INNERVATING LUMBAR FACET JOINT Left 2/6/2025    Procedure: Left L4-5 and L5-S1 MBB #1 with local;  Surgeon: Jassi Pierre MD;  Location: [facility] PAIN MGT;  Service: Pain Management;  Laterality: Left;    INJECTION OF ANESTHETIC AGENT AROUND MEDIAL BRANCH NERVES INNERVATING LUMBAR FACET JOINT Left 3/19/2025    Procedure: Left L4-5 and L5-S1 MBB #2 with local;  Surgeon: Jassi Pierre MD;  Location: [facility] PAIN MGT;  Service: Pain Management;  Laterality: Left;    INJECTION OF ANESTHETIC AGENT INTO SACROILIAC JOINT Right 08/22/2022    Procedure: Right SIJ Injection Right L5/S1 Facte Injection;  Surgeon: Jassi Pierre MD;  Location: [facility] PAIN MGT;  Service: Pain Management;  Laterality: Right;    INSERTION OF TUNNELED CENTRAL VENOUS CATHETER (CVC) WITH SUBCUTANEOUS PORT N/A 11/09/2021     Procedure: [id number]-[id number];  Surgeon: Christoph Douglas MD;  Location: [facility] OR;  Service: General;  Laterality: N/A;    RADIOFREQUENCY ABLATION Right 12/5/2024    Procedure: Right L4-5 and L5-S1 RFA;  Surgeon: Jassi Pierre MD;  Location: [facility] PAIN MGT;  Service: Pain Management;  Laterality: Right;    RADIOFREQUENCY THERMOCOAGULATION Right 12/07/2022    Procedure: Right L4/L5 and L5/S1 Lumbar RFA;  Surgeon: Jassi Pierre MD;  Location: [facility] PAIN MGT;  Service: Pain Management;  Laterality: Right;    REMOVAL OF ELECTRODE LEAD OF SACRAL NERVE STIMULATOR N/A 2/18/2025    Procedure: REMOVAL, ELECTRODE LEAD, SACRAL NERVE STIMULATOR;  Surgeon: Sami Cochran MD;  Location: [facility] OR;  Service: Urology;  Laterality: N/A;    REMOVAL OF VASCULAR ACCESS PORT      ROBOT-ASSISTED LAPAROSCOPIC ABDOMINAL SACROCOLPOPEXY N/A 08/10/2023    Procedure: ROBOTIC SACROCOLPOPEXY, ABDOMEN;  Surgeon: PRANAY Villalobos MD;  Location: [facility];  Service: OB/GYN;  Laterality: N/A;    ROBOT-ASSISTED LAPAROSCOPIC OOPHORECTOMY Right 08/10/2023    Procedure: ROBOTIC OOPHORECTOMY;  Surgeon: PRANAY Villalobos MD;  Location: [facility] OR;  Service: OB/GYN;  Laterality: Right;    SENTINEL LYMPH NODE BIOPSY Left 10/12/2021    Procedure: BIOPSY, LYMPH NODE, SENTINEL;  Surgeon: Christoph Douglas MD;  Location: [facility] OR;  Service: General;  Laterality: Left;    TOE SURGERY      XI ROBOTIC URETHROPEXY N/A 08/10/2023    Procedure: XI ROBOTIC URETHROPEXY;  Surgeon: PRANAY Villalobos MD;  Location: [facility] OR;  Service: OB/GYN;  Laterality: N/A;       Review of patient's allergies indicates:   Allergen Reactions    Dobutamine Other (See Comments)     Severe Left sided chest pain after dosage administered for Dobutamine Stress Test; itching    Lisinopril Swelling and Rash    Hydrocodone-acetaminophen Nausea Only    Augmentin [amoxicillin-pot clavulanate] Diarrhea    Zyvox [linezolid] Nausea And Vomiting       No current facility-administered  medications on file prior to encounter.     Current Outpatient Medications on File Prior to Encounter   Medication Sig    aspirin (ECOTRIN) 81 MG EC tablet Take 1 tablet (81 mg total) by mouth once daily.    calcitRIOL (ROCALTROL) 0.25 MCG Cap Take 1 capsule (0.25 mcg total) by mouth once daily.    carvediloL (COREG) 3.125 MG tablet Take 1 tablet (3.125 mg total) by mouth 2 (two) times daily with meals.    cycloSPORINE (NEORAL) 100 mg/mL microemulsion solution Take 0.5 mLs (50 mg total) by mouth every 12 (twelve) hours.    furosemide (LASIX) 40 MG tablet Take 1 tablet (40 mg total) by mouth once daily.    NIFEdipine (PROCARDIA-XL) 60 MG (OSM) 24 hr tablet Take 1 tablet (60 mg total) by mouth once daily.    nitroGLYCERIN (NITROSTAT) 0.4 MG SL tablet PLACE 1 TABLET UNDER THE TONGUE EVERY 5 MINS AS NEEDED FOR CHEST PAIN FOR UP TO 3 DOSES.IF CONTINUED HEART PAIN/PRESSURE AFTER    oxyCODONE (ROXICODONE) 5 MG immediate release tablet Take 1 tablet (5 mg total) by mouth every 4 (four) hours as needed for Pain.    pantoprazole (PROTONIX) 40 MG tablet Take 1 tablet (40 mg total) by mouth once daily.    pregabalin (LYRICA) 50 MG capsule Take 1 capsule (50 mg total) by mouth every evening.    sertraline (ZOLOFT) 25 MG tablet Take 1 tablet (25 mg total) by mouth once daily.    sodium bicarbonate 650 MG tablet Take 1 tablet (650 mg total) by mouth 2 (two) times daily.    temazepam (RESTORIL) 30 mg capsule Take 1 capsule (30 mg total) by mouth nightly as needed for Insomnia.    tiZANidine (ZANAFLEX) 2 MG tablet Take 2 tablets (4 mg total) by mouth 2 (two) times a day.    vitamin E 400 UNIT capsule Take 400 Units by mouth once daily.    dextromethorphan-guaiFENesin  mg (MUCINEX DM)  mg per 12 hr tablet Take 1 tablet by mouth 2 (two) times daily. for 10 days    mupirocin (BACTROBAN) 2 % ointment Apply topically 2 (two) times daily.    ondansetron (ZOFRAN) 4 MG tablet Take 1 tablet (4 mg total) by mouth every 12 (twelve)  hours as needed for Nausea.    oxyCODONE-acetaminophen (PERCOCET) 5-325 mg per tablet Take 1 tablet by mouth every 4 (four) hours as needed for Pain.    polyethylene glycol (GLYCOLAX) 17 gram PwPk Take 17 g by mouth once daily.     Family History       Problem Relation (Age of Onset)    Breast cancer Mother, Maternal Grandmother    Cancer Mother (38)    Cataracts Cousin    Heart disease Maternal Grandmother    Hypertension Son          Tobacco Use    Smoking status: Never     Passive exposure: Never    Smokeless tobacco: Never   Substance and Sexual Activity    Alcohol use: Never     Alcohol/week: 0.0 standard drinks of alcohol    Drug use: No    Sexual activity: Not Currently     Partners: Male     Birth control/protection: See Surgical Hx     Review of Systems   Constitutional:  Negative for chills, diaphoresis, fatigue and fever.   Respiratory:  Negative for cough and shortness of breath.    Cardiovascular:  Negative for chest pain, palpitations and leg swelling.   Gastrointestinal:  Negative for abdominal pain, diarrhea, nausea and vomiting.   Neurological:  Positive for dizziness, syncope and light-headedness.   All other systems reviewed and are negative.    Objective:     Vital Signs (Most Recent):  Temp: 97.8 °F (36.6 °C) (05/12/25 2210)  Pulse: 101 (05/12/25 2210)  Resp: 18 (05/13/25 0244)  BP: (!) 162/85 (05/12/25 2210)  SpO2: 98 % (05/12/25 2210) Vital Signs (24h Range):  Temp:  [97 °F (36.1 °C)-97.8 °F (36.6 °C)] 97.8 °F (36.6 °C)  Pulse:  [] 101  Resp:  [17-20] 18  SpO2:  [94 %-98 %] 98 %  BP: (101-162)/(52-85) 162/85     Weight: 73.8 kg (162 lb 11.2 oz)  Body mass index is 29.76 kg/m².     Physical Exam  Vitals and nursing note reviewed.   Constitutional:       General: She is awake. She is not in acute distress.     Appearance: Normal appearance. She is well-developed and well-groomed. She is not ill-appearing, toxic-appearing or diaphoretic.   HENT:      Head: Normocephalic and atraumatic.       Mouth/Throat:      Lips: Pink.      Mouth: Mucous membranes are moist.      Pharynx: Oropharynx is clear. Uvula midline.   Eyes:      Extraocular Movements: Extraocular movements intact.      Conjunctiva/sclera: Conjunctivae normal.      Pupils: Pupils are equal, round, and reactive to light.   Cardiovascular:      Rate and Rhythm: Normal rate and regular rhythm.      Heart sounds: Normal heart sounds. No murmur heard.  Pulmonary:      Effort: Pulmonary effort is normal.      Breath sounds: Normal breath sounds. No decreased breath sounds, wheezing, rhonchi or rales.   Abdominal:      General: Bowel sounds are normal.      Palpations: Abdomen is soft.      Tenderness: There is no abdominal tenderness. There is no right CVA tenderness, left CVA tenderness, guarding or rebound.   Musculoskeletal:      Cervical back: Normal range of motion and neck supple.      Right lower leg: No edema.      Left lower leg: No edema.      Comments: 5/5 strength throughout   Skin:     General: Skin is warm and dry.      Capillary Refill: Capillary refill takes less than 2 seconds.   Neurological:      General: No focal deficit present.      Mental Status: She is alert and oriented to person, place, and time. Mental status is at baseline.      GCS: GCS eye subscore is 4. GCS verbal subscore is 5. GCS motor subscore is 6.      Cranial Nerves: Cranial nerves 2-12 are intact.      Sensory: Sensation is intact.      Motor: Motor function is intact.   Psychiatric:         Mood and Affect: Mood normal.         Speech: Speech normal.         Behavior: Behavior normal. Behavior is cooperative.              CRANIAL NERVES     CN III, IV, VI   Pupils are equal, round, and reactive to light.     LABS:  Recent Results (from the past 24 hours)   Brain natriuretic peptide    Collection Time: 05/12/25  7:34 PM   Result Value Ref Range     (H) 0 - 99 pg/mL   Comprehensive metabolic panel    Collection Time: 05/12/25  7:34 PM   Result Value Ref  Range    Sodium 137 136 - 145 mmol/L    Potassium 4.4 3.5 - 5.1 mmol/L    Chloride 102 95 - 110 mmol/L    CO2 21 (L) 23 - 29 mmol/L    Glucose 97 70 - 110 mg/dL    BUN 45 (H) 8 - 23 mg/dL    Creatinine 3.9 (H) 0.5 - 1.4 mg/dL    Calcium 9.5 8.7 - 10.5 mg/dL    Protein Total 9.0 (H) 6.0 - 8.4 gm/dL    Albumin 3.5 3.5 - 5.2 g/dL    Bilirubin Total 0.4 0.1 - 1.0 mg/dL     (H) 40 - 150 unit/L    AST 36 11 - 45 unit/L    ALT 28 10 - 44 unit/L    Anion Gap 14 8 - 16 mmol/L    eGFR 12 (L) >60 mL/min/1.73/m2   Troponin I    Collection Time: 05/12/25  7:34 PM   Result Value Ref Range    Troponin-I 0.139 (H) <=0.026 ng/mL   Magnesium    Collection Time: 05/12/25  7:34 PM   Result Value Ref Range    Magnesium  1.9 1.6 - 2.6 mg/dL   CBC with Differential    Collection Time: 05/12/25  7:34 PM   Result Value Ref Range    WBC 3.80 (L) 3.90 - 12.70 K/uL    RBC 3.29 (L) 4.00 - 5.40 M/uL    HGB 9.5 (L) 12.0 - 16.0 gm/dL    HCT 29.2 (L) 37.0 - 48.5 %    MCV 89 82 - 98 fL    MCH 28.9 27.0 - 31.0 pg    MCHC 32.5 32.0 - 36.0 g/dL    RDW 15.5 (H) 11.5 - 14.5 %    Platelet Count 238 150 - 450 K/uL    MPV 9.3 9.2 - 12.9 fL    Nucleated RBC 0 <=0 /100 WBC    Neut % 60.8 38 - 73 %    Lymph % 25.0 18 - 48 %    Mono % 10.0 4 - 15 %    Eos % 3.4 <=8 %    Basophil % 0.3 <=1.9 %    Imm Grans % 0.5 0.0 - 0.5 %    Neut # 2.31 1.8 - 7.7 K/uL    Lymph # 0.95 (L) 1 - 4.8 K/uL    Mono # 0.38 0.3 - 1 K/uL    Eos # 0.13 <=0.5 K/uL    Baso # 0.01 <=0.2 K/uL    Imm Grans # 0.02 0.00 - 0.04 K/uL   Light Blue Top Hold    Collection Time: 05/12/25  7:37 PM   Result Value Ref Range    Extra Tube Hold for add-ons.    Gold Top Hold    Collection Time: 05/12/25  7:37 PM   Result Value Ref Range    Extra Tube Hold for add-ons.    Urinalysis, Reflex to Urine Culture Urine, Clean Catch    Collection Time: 05/12/25  8:00 PM    Specimen: Urine, Clean Catch   Result Value Ref Range    Color, UA Yellow Straw, [place], Yellow, Light-Orange    Appearance, UA Hazy (A)  Clear    pH, UA 6.0 5.0 - 8.0    Spec Grav UA 1.010 1.005 - 1.030    Protein, UA 1+ (A) Negative    Glucose, UA Negative Negative    Ketones, UA Negative Negative    Bilirubin, UA Negative Negative    Blood, UA Negative Negative    Nitrites, UA Negative Negative    Urobilinogen, UA Negative <2.0 EU/dL    Leukocyte Esterase, UA 3+ (A) Negative   GREY TOP URINE HOLD    Collection Time: 05/12/25  8:00 PM   Result Value Ref Range    Extra Tube Hold for add-ons.    Urinalysis Microscopic    Collection Time: 05/12/25  8:00 PM   Result Value Ref Range    RBC, UA <1 0 - 4 /HPF    WBC, UA 17 (H) 0 - 5 /HPF    WBC Clumps, UA None None, Rare    Bacteria, UA Rare None, Rare, Occasional /HPF    Yeast, UA None None /HPF    Squamous Epithelial Cells, UA 8 /HPF    Non-Squamous Epithelial Cells 1 (H) <=0 /HPF    Hyaline Casts, UA 0 0 - 1 /LPF    Epithelial Casts 0 None /lpf    WBC Casts 0 None /LPF    RBC Casts 0 None /LPF    Granular Casts 0 None  /LPF    Fatty Casts, UA 0 None /LPF    Waxy Casts, UA 0 None /lpf    Triple Phosphate Crystals, UA None None, Rare, Occasional, Few, Moderate    Calcium Oxalate Crystals, UA None None, Rare, Occasional, Few, Moderate    Calcium Phosphate Crystal None Rare, None, Moderate, Few, Occasional    Calcium Carbonate Crystal None Occasional, Rare, Few, None, Moderate    Ammonium Urate Crystals None Moderate, Occasional, Rare, Few, None    Uric Acid Crystals, UA None None, Rare, Occasional, Few, Moderate    Amorphous, UA None None, Occasional, Few, Moderate, Rare    Unclassified Crystals None None, Rare, Occasional, Few, Moderate    Trichomonas, UA None None /HPF    Other, UA None None    Microscopic Comment     Troponin I    Collection Time: 05/12/25 10:36 PM   Result Value Ref Range    Troponin-I 0.095 (H) <=0.026 ng/mL   Troponin I    Collection Time: 05/13/25  2:00 AM   Result Value Ref Range    Troponin-I 0.106 (H) <=0.026 ng/mL       RADIOLOGY  X-Ray Chest AP Portable  Result Date:  5/7/2025  EXAMINATION: XR CHEST AP PORTABLE CLINICAL HISTORY: Acute chest pain, Chest Pain; COMPARISON: 05/03/2025 chest x-ray FINDINGS: Sternal wires are present.  Epicardial lead is again noted.  Cardiac size is enlarged.  Chronic left pericardial scarring is noted. No acute abnormality is seen. Left axillary surgical clips.     Stable chest x-ray with no acute abnormality. Electronically signed by: Gunner Rao MD Date:    05/07/2025 Time:    10:48    X-Ray Chest AP Portable  Result Date: 5/4/2025  EXAMINATION: XR CHEST AP PORTABLE CLINICAL HISTORY: shortness of breath, cough; TECHNIQUE: Single frontal view of the chest was performed. COMPARISON: 05/03/2025 FINDINGS: Prior cardiac surgery.  Similar fractured upper-most sternotomy wire.  Overlying monitoring leads.  Allowing for some difference in position and technique, heart and lungs appear similar without significant interval detrimental change.  No gross pneumothorax.     As above. Electronically signed by: Jose Ramon Benton Date:    05/04/2025 Time:    08:36    X-Ray Chest AP Portable  Result Date: 5/3/2025  EXAMINATION: XR CHEST AP PORTABLE CLINICAL HISTORY: [place] on Pulmonary edema; TECHNIQUE: Single frontal view of the chest was performed. COMPARISON: 04/03/2025 FINDINGS: Prior cardiac surgery with fractured upper most sternotomy wire, similar.  Overlying monitoring leads.  Heart and lungs appear similar without significant interval detrimental change.  No gross pneumothorax.     As above. Electronically signed by: Jose Ramon Benton Date:    05/03/2025 Time:    18:14    X-Ray Chest 1 View  Result Date: 4/30/2025  EXAMINATION: XR CHEST 1 VIEW CLINICAL HISTORY: chf; TECHNIQUE: Single frontal view of the chest was performed. COMPARISON: N 04/29/2025 one FINDINGS: The ET and NG tube has been removed.  Continued mild cardiomegaly.  No significant airspace consolidation or pleural effusion identified.     No significant changes Electronically signed  by: Omar Rosenberg MD Date:    04/30/2025 Time:    08:56    X-Ray Chest 1 View  Result Date: 4/29/2025  EXAMINATION: XR CHEST 1 VIEW CLINICAL HISTORY: chf; TECHNIQUE: Single frontal view of the chest was performed. COMPARISON: 04/28/2025 FINDINGS: ET tube distal tip within the mid trachea, enteric tube distal tip within the stomach.  Sternotomy wires. The cardiac silhouette is prominent.  The pulmonary vascularity is slightly increased centrally.  No acute focal area of airspace consolidation.  No pleural effusion. The osseous structures appear normal.     No significant interval change. Electronically signed by: Angela Glez MD Date:    04/29/2025 Time:    08:14    Echo  Result Date: 4/28/2025    S/P heart transplant 1993.   Left Ventricle: The left ventricle is normal in size. Normal wall thickness. There is concentric remodeling. There is mildly reduced systolic function. Ejection fraction is approximately 45%. Mild global hypokinesis present. Contractility is better preserved in the basal segements. There is normal diastolic function.   Right Ventricle: The right ventricle is normal in size. Systolic function is moderately reduced. TAPSE is 0.9 cm.   Tricuspid Valve: There is mild to moderate regurgitation.   Pulmonary Artery: The estimated pulmonary artery systolic pressure is 28 mmHg.   IVC/SVC: Normal venous pressure at 3 mmHg.     X-Ray Chest 1 View  Result Date: 4/28/2025  EXAMINATION: XR CHEST 1 VIEW CLINICAL HISTORY: chf; FINDINGS: Chest one view: ET tip T4, NG tip below diaphragm.  There is postoperative change.  There is cardiomegaly mild edema.     Cardiomegaly mild edema. Electronically signed by: Rudy Sierra MD Date:    04/28/2025 Time:    09:47    XR NG/OG tube placement check, non-radiologist performed  Result Date: 4/27/2025  EXAMINATION: XR NG/OG TUBE PLACEMENT CHECK, NON-RADIOLOGIST PERFORMED CLINICAL HISTORY: OG; TECHNIQUE: Single view of the abdomen COMPARISON: 04/25/2025 FINDINGS:  Nasogastric tube overlies the stomach.     See above Electronically signed by: Ty Murillo Date:    04/27/2025 Time:    12:35    X-Ray Chest AP Portable  Result Date: 4/27/2025  EXAMINATION: XR CHEST AP PORTABLE CLINICAL HISTORY: ET tube placement; TECHNIQUE: Single frontal view of the chest was performed. COMPARISON: 04/25/2025 FINDINGS: Endotracheal tube tip lies approximately 5 cm above the tomasa.  The fibular pads are in place.  The cardiomediastinal silhouette is prominent, stable in configuration noting calcification of the aorta..  There is no pleural effusion.  The trachea is midline.  The lungs are symmetrically expanded bilaterally with coarse interstitial attenuation bilaterally, similar to the previous exam.  There is left basilar subsegmental atelectasis..  No large focal consolidation seen.  There is no pneumothorax.  The osseous structures are unchanged..     As above Electronically signed by: Jassi Bhakta MD Date:    04/27/2025 Time:    12:05    CT Head Without Contrast  Result Date: 4/27/2025  EXAMINATION: CT HEAD WITHOUT CONTRAST CLINICAL HISTORY: post arrest; TECHNIQUE: Multiple sequential 5 mm axial images of the head without contrast.  Coronal and sagittal reformatted imaging from the axial acquisition. COMPARISON: 11/12/2024 FINDINGS: Study is distorted by artifact from motion and external metal, within limits of the study there is no evidence for acute intracranial hemorrhage or sulcal effacement to suggest large territory recent infarction.  Ventricles relatively stable without hydrocephalus.  No midline shift or significant mass effect.  Visualized paranasal sinuses and mastoid air cells are clear.     No acute intracranial findings as detailed above specifically without evidence for acute intracranial hemorrhage or evidence for large territory recent infarction. Please note MRI would be of greater sensitivity for evaluation for hypoxic ischemic injury and further evaluation with MRI as  warranted if patient compatible. Electronically signed by: Naveen Zaldivar DO Date:    04/27/2025 Time:    11:57    Transesophageal echo (AYAAN)  Result Date: 4/26/2025    AYAAN performed for suspicion of aortic dissection.   Aorta: The aortic root is normal in size measuring 3.4 cm. The aortic root at ST junction is normal. The ascending aorta is normal in size measuring 3.6 cm. Grade 2 atherosclerosis of the descending aorta. No dissection flap visualized.   Left Ventricle: The left ventricle is normal in size. Normal wall thickness. There is normal systolic function. Ejection fraction is approximately 55%.   Right Ventricle: The right ventricle is normal in size. Wall thickness is normal. Systolic function is borderline low.   Tricuspid Valve: There is mild regurgitation.     X-Ray Abdomen AP 1 View  Result Date: 4/25/2025  EXAMINATION: XR ABDOMEN AP 1 VIEW CLINICAL HISTORY: abdominal pain; TECHNIQUE: AP View(s) of the abdomen was performed. COMPARISON: June 27, 2024 and May 2018 FINDINGS: There is scattered stool and bowel gas both small and large bowel.  Presumed external pacer lead overlies the upper abdomen.  Cylindrical density overlies the left iliac wing as well as an adjacent tubing.  Degenerative change in the spine.     Scattered stool and bowel gas both small and large bowel.  Additional findings above. Electronically signed by: Sp Edward MD Date:    04/25/2025 Time:    08:32    Echo  Result Date: 4/25/2025    Post cardiac transplantation study - OHTx 5/27/1993 at [facility]   Left Ventricle: The left ventricle is normal in size. Normal wall thickness. There is concentric remodeling. There is normal systolic function with a visually estimated ejection fraction of 55 - 60%. There is indeterminate diastolic function.   Right Ventricle: The right ventricle is normal in size. Wall thickness is normal. Systolic function is borderline low.   Tricuspid Valve: There is mild regurgitation.   Pulmonary  Artery: No pulmonary hypertension.     X-Ray Chest AP Portable  Result Date: 4/25/2025  EXAMINATION: XR CHEST AP PORTABLE CLINICAL HISTORY: chest pain; TECHNIQUE: Single frontal view of the chest was performed. COMPARISON: No 04/24/2025 ne FINDINGS: Postoperative changes as before.  Mild cardiomegaly.  No significant airspace consolidation or pleural effusion identified.     See above Electronically signed by: Omar Rosenberg MD Date:    04/25/2025 Time:    08:04    X-Ray Chest AP Portable  Result Date: 4/24/2025  EXAMINATION: XR CHEST AP PORTABLE CLINICAL HISTORY: Acute chest pain, chest pain; COMPARISON: 01/19/2025 x-ray FINDINGS: Postop changes with sternal wires.  External pericardial wire noted. Cardiomegaly.  Chronic scarring within the left lung. No acute findings.     Stable postoperative chest x-ray. Electronically signed by: Gunner Rao MD Date:    04/24/2025 Time:    14:01      EKG    MICROBIOLOGY    MDM     Amount and/or Complexity of Data Reviewed  Clinical lab tests: reviewed  Tests in the radiology section of CPT®: reviewed  Tests in the medicine section of CPT®: reviewed  Discussion of test results with the performing providers: yes  Decide to obtain previous medical records or to obtain history from someone other than the patient: yes  Obtain history from someone other than the patient: yes  Review and summarize past medical records: yes  Discuss the patient with other providers: yes  Independent visualization of images, tracings, or specimens: yes

## 2025-05-13 NOTE — PROGRESS NOTES
[facility] - Telemetry ([facility])  Adult Nutrition  Progress Note    SUMMARY       Recommendations    Recommendation/Intervention:   1. Recommend pt continues on a Heart healhty, 2000 mL fluid restriction diet   2. Recommend Suplena BID to assist filling nutritional gaps   3. Encourage PO and supplement intake, recommend feeding assistane as warranted   4. Recommend a bowel regimen as warranted   5. Weigh twice weekly    Goals:   1. Pt will tolerate and consume >75% EEN and EPN prior to RD follow up   2. Pt has a BM prior to RD follow up  Nutrition Goal Status: new  Communication of RD Recs: other (comment) (Progress note, POC, sticky note)    Nutrition Discharge Planning    Nutrition Discharge Planning: Therapeutic diet (comments), Oral supplement regimen (comments)  Therapeutic diet (comments): Heart healthy, Renal diet + Fluidrestriction per MD/NP  Oral supplement regimen (comments): a daily renal multivitamin, Suplena as warranted         Malnutrition Assessment     Skin (Micronutrient): edema (Gabe score = 17 (mild risk)       Fluid Accumulation (Malnutrition): mild                         Reason for Assessment    Reason For Assessment: identified at risk by screening criteria  Diagnosis:  (Syncope)  General Information Comments:   5/13/25: 69 y.o. Female admitted for Syncope. PMH: Fibromyalgia, GERD, HTN, Chest pain, CAD, MDD, GRACE on CKD 4, Eleavated troponin, Normocytic anemia; Hx: S/p orthotopic heart transplant. Pt is currently on a Heart healthy, 2000 mL fluid restriction diet. RD assessing pt d/t identified at risk from MST. Per EMR syncope likely d/t dehydration vs medication side effect, negative for GI issuess. RD visited pt at bedside, pt was sleeping, provided pt with a menu to help encourage pt preferred food choices. Note RD attached nutrition education to pt's d/c papers. Per chart: 25-50% PO intake; LBM 5/10; 1+ trace/ 2+ mild edema. RD added Suplena to pt's orders and labs. Labs, meds, weights  "reviewed. Weight charted 4/10/25 145 lbs, 25 137 lbs (BMI 25.1, Normal for age, -8 lb wt loss (6% wt change) x 1 month, significant wt loss, will perfrom full NFPE at follow up. Note weight charted 25 162 lbs, +25 lb wt gain x 1day, reweigh for accuracy warranted. RD will continue to follow and monitor pt's nutritional status during admit.    Nutrition/Diet History    Spiritual, Cultural Beliefs, Catholic Practices, Values that Affect Care: no  Food Allergies: NKFA  Factors Affecting Nutritional Intake: decreased appetite    Nutrition Related Social Determinants of Health: SDOH: Unable to assess at this time.     Anthropometrics    Height: 5' 2" (157.5 cm)  Height (inches): 62 in  Height Method: Stated  Weight: 62.1 kg (137 lb)  Weight (lb): 137 lb  Weight Method: Bed Scale  Ideal Body Weight (IBW), Female: 110 lb  % Ideal Body Weight, Female (lb): 124.55 %  BMI (Calculated): 25.1  BMI Grade: 25 - 29.9 - overweight (Normal for age)  Usual Body Weight (UBW), k.9 kg  Weight Change Amount: 8 lb  % Usual Body Weight: 94.5  % Weight Change From Usual Weight: -5.7 %       Wt Readings from Last 15 Encounters:   25 62.1 kg (137 lb)   25 62.1 kg (137 lb 0.3 oz)   25 59.4 kg (131 lb)   25 59.5 kg (131 lb 2.8 oz)   25 66.2 kg (145 lb 15.1 oz)   25 66.3 kg (146 lb 0.9 oz)   25 66.3 kg (146 lb 0.9 oz)   04/10/25 66 kg (145 lb 8.1 oz)   25 65.1 kg (143 lb 8.3 oz)   25 65 kg (143 lb 4.8 oz)   25 66.3 kg (146 lb 4.4 oz)   25 66.3 kg (146 lb 4.4 oz)   25 68.2 kg (150 lb 5.7 oz)   25 68.2 kg (150 lb 5.7 oz)   25 68.2 kg (150 lb 7.4 oz)     Lab/Procedures/Meds    Pertinent Labs Reviewed: reviewed  Pertinent Medications Reviewed: reviewed    BMP  Lab Results   Component Value Date     2025    K 4.2 2025     2025    CO2 22 (L) 2025    BUN 43 (H) 2025    CREATININE 3.5 (H) 2025    CALCIUM 8.4 " "(L) 05/13/2025    ANIONGAP 10 05/13/2025    EGFRNORACEVR 14 (L) 05/13/2025     Lab Results   Component Value Date    CALCIUM 8.4 (L) 05/13/2025    PHOS 2.9 05/06/2025     Lab Results   Component Value Date    ALBUMIN 3.5 05/12/2025     Lab Results   Component Value Date    ALT 28 05/12/2025    AST 36 05/12/2025    ALKPHOS 195 (H) 05/12/2025    BILITOT 0.4 05/12/2025     No results for input(s): "POCTGLUCOSE" in the last 24 hours.    Lab Results   Component Value Date    HGBA1C 5.1 07/02/2024     Lab Results   Component Value Date    WBC 3.90 05/13/2025    HGB 7.6 (L) 05/13/2025    HCT 23.4 (L) 05/13/2025    MCV 88 05/13/2025     05/13/2025       Scheduled Meds:   aspirin  81 mg Oral Daily    carvediloL  3.125 mg Oral BID WM    heparin (porcine)  5,000 Units Subcutaneous Q8H    [START ON 5/14/2025] NIFEdipine  60 mg Oral Daily    [START ON 5/14/2025] pantoprazole  40 mg Oral Daily    pregabalin  50 mg Oral QHS    sertraline  25 mg Oral Daily    sodium bicarbonate  650 mg Oral BID     Continuous Infusions:   lactated ringers   Intravenous Continuous 75 mL/hr at 05/13/25 1213 New Bag at 05/13/25 1213     PRN Meds:.  Current Facility-Administered Medications:     0.9%  NaCl infusion (for blood administration), , Intravenous, Q24H PRN    acetaminophen, 650 mg, Rectal, Q6H PRN    acetaminophen, 650 mg, Oral, Q6H PRN    aluminum-magnesium hydroxide-simethicone, 30 mL, Oral, QID PRN    dextrose 50%, 12.5 g, Intravenous, PRN    dextrose 50%, 25 g, Intravenous, PRN    glucagon (human recombinant), 1 mg, Intramuscular, PRN    glucose, 16 g, Oral, PRN    glucose, 24 g, Oral, PRN    hydrALAZINE, 10 mg, Intravenous, Q8H PRN    naloxone, 0.02 mg, Intravenous, PRN    ondansetron, 4 mg, Intravenous, Q8H PRN    polyethylene glycol, 17 g, Oral, Daily PRN    promethazine, 25 mg, Oral, Q6H PRN    simethicone, 1 tablet, Oral, QID PRN    sodium chloride 0.9%, 3 mL, Intravenous, Q12H PRN    tiZANidine, 4 mg, Oral, BID PRN    " zolpidem, 5 mg, Oral, Nightly PRN      Estimated/Assessed Needs    Weight Used For Calorie Calculations: 62.1 kg (136 lb 14.5 oz)  Energy Calorie Requirements (kcal): 9033-8773 kcals (20-30 kcals/kg ABW (GRACE)  Energy Need Method: Kcal/kg  Protein Requirements: 49-62 g (0.8-1.0 g/kg ABW (GRACE, no dialysis)  Weight Used For Protein Calculations: 62.1 kg (136 lb 14.5 oz)  Fluid Requirements (mL): 2000 mL fluid restriction (Per MD/NP)  Estimated Fluid Requirement Method: other (see comments)  RDA Method (mL): 1242  CHO Requirement: 155-233 g (7656-4169 kcals/8)      Nutrition Prescription Ordered    Current Diet Order: Heart healthy, 2000 mL fluid restriction diet  Oral Nutrition Supplement: Suplena BID    Evaluation of Received Nutrient/Fluid Intake  I/O: (Net since admit):  5/13/25: -2150 mL    Energy Calories Required: not meeting needs  Protein Required: not meeting needs  Fluid Required: not meeting needs  Total Fluid Intake (mL): 250  Comments: LBM 5/10 (small, hard) x 3 days no BM  Tolerance: tolerating  % Intake of Estimated Energy Needs: 25 - 50 %  % Meal Intake: 25 - 50 %    Assessment and Plan:  PES Statement  Inadequate protein energy intake related to  (Decreased ability to consume sufficient protein/energy) as evidenced by  (Estimated intake of food less than estimated needs)  Status: New    Interventions/Recommendations (treatment strategy):   1. Commercial beverage medical food supplement therapy  2. Management of nutrition related prescription medication   3. Feeding assistance management  4. Collaboration by nutrition professional with other providers    Nutrition Risk    Level of Risk/Frequency of Follow-up: high (F/u x 2 weekly)     Monitor and Evaluation    Monitor and Evaluation: Energy intake, Food and beverage intake, Protein intake, Diet order, Weight, Electrolyte and renal panel, Gastrointestinal profile, Glucose/endocrine profile, Inflammatory profile, Nutrition focused physical findings      Nutrition Follow-Up    RD Follow-up?: Yes  FADUMO Espinoza, RDN, LDN

## 2025-05-13 NOTE — ASSESSMENT & PLAN NOTE
GRACE is likely due to pre-renal azotemia due to intravascular volume depletion. Baseline creatinine is 2.9. Most recent creatinine and eGFR are listed below.  Recent Labs     05/12/25  1934 05/13/25  0442   CREATININE 3.9* 3.5*   EGFRNORACEVR 12* 14*      Plan  - GRACE is being treated  - Avoid nephrotoxins and renally dose meds for GFR listed above  - Monitor urine output, serial BMP, and adjust therapy as needed

## 2025-05-13 NOTE — ASSESSMENT & PLAN NOTE
-Presents s/p near syncopal episode, denies LOC  -Trop elevated but flat  -Orthostatic vitals negative  -Noted to have GRACE and hypotension upon admission-->likely cause of episode  -H/H also dipped to 7.6/23.4 this AM, needs further and transfusion w/u per primary team  -Recent TTE 4/2025 with normal EF; prior Cardiac PET 11/2024 negative for ischemia/scar

## 2025-05-13 NOTE — ASSESSMENT & PLAN NOTE
Likely secondary from post cardiac arrest compressions.  Currently on muscle relaxers in narcotics in outpatient setting.  Denies any new or worsening pain.  Plan:   -tele monitoring   -continue analgesics p.r.n.   -continue muscle relaxers p.r.n.  -trend troponin   -EKG as needed  -cardiology consult

## 2025-05-13 NOTE — CONSULTS
[facility] - Telemetry ([facility])  Cardiology  Consult Note    Patient Name: Nadia Damon  MRN: [id number]  Admission Date: 5/12/2025  Hospital Length of Stay: 1 days  Code Status: Partial Code   Attending Provider: Ra Rowe MD   Consulting Provider: Nilda Rehman PA-C  Primary Care Physician: Elis Wick MD  Principal Problem:Syncope    Patient information was obtained from patient, past medical records, and ER records.     Inpatient consult to Cardiology  Consult performed by: Nilda Rehman PA-C  Consult ordered by: Chetan Galdamez NP        Subjective:     Chief Complaint:  CP/near syncope    HPI:   Ms. Damon is a 69 year old female patient whose current medical conditions include anemia, leukopenia, breast CA s/p excision and chemo/radiation, cervical spine DJD, and remote history of OHT in 5/1993 with PPM placed at same time who presented to [facility] ED yesterday from primary care clinic due to a near syncopal episode. Patient became lightheaded and dizzy yesterday after her appointment while waiting for her Lyft and eventually fell to the ground. The  at the clinic was able to abort the fall, and patient did not suffer any injury but she continued to feel lethargic and fatigued. Her BP was taken and she was found to be notably hypotensive (90/52 and ? 80/40) and thus EMS was called to transport her to the emergency room. Initial labs revealed troponin of 0.139, GRACE (creatinine of 3.9), BNP of 461, and mild anemia (9.5/29.2), and patient subsequently admitted for further evaluation and treatment. Cardiology consulted to assist with management. Patient seen and examined today, resting in bed. Feels ok. Still weak/tired. Reports she was just recently admitted and discharged from [facility] on 5/6/2025. Suffered a brief cardiac arrest during that admission (rhythm unknown, required 1 round of CPR and 1 round of epi). LHC was deferred at that time given patient's  renal function and prior negative Cardiac PET in 11/2024. Patient endorses having ongoing issues with MSK chest wall/soreness since that time. Labs reviewed. Troponin elevated but flat, 0.139>0.095>0.106. EKG without acute ischemic changes. TTE 4/2025 with normal EF. Creatinine improved to 3.5. Orthostatic vitals negative upon admission. H/H dipped to 7.6/23.4 this AM, consider transfusion.      Past Medical History:   Diagnosis Date    Abdominal wall hernia     CT Renal 6/11/2018---Small fat containing superior ventral abdominal wall hernia at the epicardial pacing lead site.    Abnormal mammogram 10/12/2021    Acute cystitis without hematuria 05/10/2022    Acute ischemic right middle cerebral artery (MCA) stroke 07/20/2024    Adverse drug reaction 11/12/2024    GRACE (acute kidney injury) 11/22/2021    Anxiety     Aphasia 07/19/2024    Arthritis     ZEN HIPS    Breast cancer in female 08/2021    LEFT BREAST    Breast mass, right 11/13/2024    RIGHT BREAST MASS (Problem)      Bronchitis 08/18/2016    Never smoked      C. difficile colitis 11/29/2021    Cellulitis of axilla, left 12/23/2021    Chronic diastolic heart failure 12/16/2021    Chronic kidney disease     stage 4, GFR 15-29 ml/min    Chronic midline low back pain without sciatica 06/18/2018    CKD (chronic kidney disease) stage 4, GFR 15-29 ml/min 04/20/2016    US Retro   5/16/2018---Mild cortical thinning and increased cortical echogenicity.  Findings can be seen with medical renal disease.  8/31/216--- Echogenic kidneys with diffuse cortical thinning suggesting  medical renal disease. 2 complex right renal cortical cysts.      Closed nondisplaced fracture of distal phalanx of left great toe with routine healing 10/22/2018    Coronary artery disease 1993    heart transplant    COVID-19 in immunocompromised patient 02/26/2024    Cystitis 05/10/2022    Depression     Encounter for blood transfusion     Encounter for palliative care 10/14/2024    Fibromyalgia      on Lyrica    Heart failure     native heart cardiomyopathy    Heart transplanted 1993    due to cardiomyopathy    History of hyperparathyroidism; Hyperparathyroidism, secondary renal     PT DENIES    Hypertension     Immune disorder     anti rejection meds    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    Impaired mobility 07/28/2022    Iron deficiency anemia 08/15/2017    Kidney stones     passed per pt    Obesity     Obesity (BMI 30.0-34.9) 07/22/2019    Other osteoporosis without current pathological fracture 08/30/2019    Other pancytopenia 05/06/2024    Parotitis, acute 09/19/2023    Pharyngitis 01/09/2019    Pneumonia due to infectious organism 03/14/2024    PONV (postoperative nausea and vomiting)     Severe sepsis 11/22/2021    Shingles 2003 approx    left leg    Stage 4 chronic kidney disease 11/22/2021    Subclinical hypothyroidism 06/16/2023    Thrombocytopenia, unspecified 11/29/2021    Trouble in sleeping     Unresponsiveness 11/13/2024    Urinary incontinence        Past Surgical History:   Procedure Laterality Date    bladder implant Right 06/11/2024    BLADDER SURGERY  2015 approx    mesh - Dr Everett then 2nd reconstructive sx Dr Onofre    BREAST BIOPSY Bilateral     NEGATIVE    BREAST BIOPSY Right 10/31/2022    benign    BREAST LUMPECTOMY Left 2021    BREAST SURGERY Left 09/28/2015    Bx - benign    BREAST SURGERY Right 12/2015    Bx benign    CARDIAC PACEMAKER REMOVAL Left 06/26/2014    Pacer defirillator removed. Put in 1993 aat time of heart transplant    CARPAL TUNNEL RELEASE Left 03/03/2015    Dr. Hall    COLONOSCOPY N/A 02/25/2021    Procedure: COLONOSCOPY;  Surgeon: Freida Ramirez MD;  Location: [facility];  Service: Endoscopy;  Laterality: N/A;    CYSTOCELE REPAIR      Twice with mesh removal    ECHOCARDIOGRAM,TRANSESOPHAGEAL N/A 4/26/2025    Procedure: Transesophageal echo (AYAAN) intra-procedure log documentation;  Surgeon: Barron Chinchilla MD;  Location: [facility]  2ND FLR;  Service: Cardiothoracic;  Laterality: N/A;    EPIDURAL STEROID INJECTION INTO CERVICAL SPINE N/A 02/02/2023    Procedure: T11/T12 IL HELLEN;  Surgeon: Jassi Pierre MD;  Location: [facility] PAIN [facility]GT;  Service: Pain Management;  Laterality: N/A;    EPIDURAL STEROID INJECTION INTO CERVICAL SPINE N/A 9/16/2024    Procedure: T11/T12 IL HELLEN;  Surgeon: Jassi Pierre MD;  Location: [facility]IN [facility]GT;  Service: Pain Management;  Laterality: N/A;    HEART TRANSPLANT  1993    HERNIA REPAIR Right 1971 approx    Inguinal    HYSTERECTOMY  1983    vag hyst /LSO     IMPLANTATION OF PERMANENT SACRAL NERVE STIMULATOR N/A 06/11/2024    Procedure: INSERTION, NEUROSTIMULATOR, PERMANENT, SACRAL;  Surgeon: Sami Cochran MD;  Location: [facility] OR;  Service: Urology;  Laterality: N/A;    INCISION AND DRAINAGE OF ABSCESS Left 12/24/2021    Procedure: INCISION AND DRAINAGE, ABSCESS;  Surgeon: Joseph Longo MD;  Location: [facility] OR;  Service: General;  Laterality: Left;    INJECTION OF ANESTHETIC AGENT AROUND MEDIAL BRANCH NERVES INNERVATING LUMBAR FACET JOINT Right 10/19/2022    Procedure: Right L4/L5 and L5/S1 MBB;  Surgeon: Jassi Pierre MD;  Location: [facility]GT;  Service: Pain Management;  Laterality: Right;    INJECTION OF ANESTHETIC AGENT AROUND MEDIAL BRANCH NERVES INNERVATING LUMBAR FACET JOINT Right 11/09/2022    Procedure: Right L4/L5 and L5/S1 MBB;  Surgeon: Jassi Pierre MD;  Location: [facility];  Service: Pain Management;  Laterality: Right;    INJECTION OF ANESTHETIC AGENT AROUND MEDIAL BRANCH NERVES INNERVATING LUMBAR FACET JOINT Left 2/6/2025    Procedure: Left L4-5 and L5-S1 MBB #1 with local;  Surgeon: Jassi Pierre MD;  Location: [facility];  Service: Pain Management;  Laterality: Left;    INJECTION OF ANESTHETIC AGENT AROUND MEDIAL BRANCH NERVES INNERVATING LUMBAR FACET JOINT Left 3/19/2025    Procedure: Left L4-5 and L5-S1 MBB #2 with local;  Surgeon: Jassi Pierre MD;  [facility];  Service: Pain Management;  Laterality: Left;    INJECTION OF ANESTHETIC AGENT INTO SACROILIAC JOINT Right 08/22/2022    Procedure: Right SIJ Injection Right L5/S1 Facte Injection;  Surgeon: Jassi Pierre MD;  Location: [facility] PAIN [facility]GT;  Service: Pain Management;  Laterality: Right;    INSERTION OF TUNNELED CENTRAL VENOUS CATHETER (CVC) WITH SUBCUTANEOUS PORT N/A 11/09/2021    Procedure: TDUPRIGME-FRHU-Y-CATH;  Surgeon: Christoph Douglas MD;  Location: [facility] OR;  Service: General;  Laterality: N/A;    RADIOFREQUENCY ABLATION Right 12/5/2024    Procedure: Right L4-5 and L5-S1 RFA;  Surgeon: Jassi Pierre MD;  Location: [facility] PAIN [facility]GT;  Service: Pain Management;  Laterality: Right;    RADIOFREQUENCY THERMOCOAGULATION Right 12/07/2022    Procedure: Right L4/L5 and L5/S1 Lumbar RFA;  Surgeon: Jassi Pierre MD;  Location: [facility] PAIN [facility]GT;  Service: Pain Management;  Laterality: Right;    REMOVAL OF ELECTRODE LEAD OF SACRAL NERVE STIMULATOR N/A 2/18/2025    Procedure: REMOVAL, ELECTRODE LEAD, SACRAL NERVE STIMULATOR;  Surgeon: Sami Cochran MD;  Location: [facility] OR;  Service: Urology;  Laterality: N/A;    REMOVAL OF VASCULAR ACCESS PORT      ROBOT-ASSISTED LAPAROSCOPIC ABDOMINAL SACROCOLPOPEXY N/A 08/10/2023    Procedure: ROBOTIC SACROCOLPOPEXY, ABDOMEN;  Surgeon: PRANAY Villalobos MD;  Location: [facility] OR;  Service: OB/GYN;  Laterality: N/A;    ROBOT-ASSISTED LAPAROSCOPIC OOPHORECTOMY Right 08/10/2023    Procedure: ROBOTIC OOPHORECTOMY;  Surgeon: PRANAY Villalobos MD;  Location: [facility] OR;  Service: OB/GYN;  Laterality: Right;    SENTINEL LYMPH NODE BIOPSY Left 10/12/2021    Procedure: BIOPSY, LYMPH NODE, SENTINEL;  Surgeon: Christoph Douglas MD;  Location: [facility] OR;  Service: General;  Laterality: Left;    TOE SURGERY      XI ROBOTIC URETHROPEXY N/A 08/10/2023    Procedure: XI ROBOTIC URETHROPEXY;  Surgeon: PRANAY Villalobos MD;  Location: [facility] OR;  Service: OB/GYN;  Laterality: N/A;       Review  of patient's allergies indicates:   Allergen Reactions    Dobutamine Other (See Comments)     Severe Left sided chest pain after dosage administered for Dobutamine Stress Test; itching    Lisinopril Swelling and Rash    Hydrocodone-acetaminophen Nausea Only    Augmentin [amoxicillin-pot clavulanate] Diarrhea    Zyvox [linezolid] Nausea And Vomiting       No current facility-administered medications on file prior to encounter.     Current Outpatient Medications on File Prior to Encounter   Medication Sig    aspirin (ECOTRIN) 81 MG EC tablet Take 1 tablet (81 mg total) by mouth once daily.    calcitRIOL (ROCALTROL) 0.25 MCG Cap Take 1 capsule (0.25 mcg total) by mouth once daily.    carvediloL (COREG) 3.125 MG tablet Take 1 tablet (3.125 mg total) by mouth 2 (two) times daily with meals.    cycloSPORINE (NEORAL) 100 mg/mL microemulsion solution Take 0.5 mLs (50 mg total) by mouth every 12 (twelve) hours.    furosemide (LASIX) 40 MG tablet Take 1 tablet (40 mg total) by mouth once daily.    NIFEdipine (PROCARDIA-XL) 60 MG (OSM) 24 hr tablet Take 1 tablet (60 mg total) by mouth once daily.    nitroGLYCERIN (NITROSTAT) 0.4 MG SL tablet PLACE 1 TABLET UNDER THE TONGUE EVERY 5 MINS AS NEEDED FOR CHEST PAIN FOR UP TO 3 DOSES.IF CONTINUED HEART PAIN/PRESSURE AFTER    oxyCODONE (ROXICODONE) 5 MG immediate release tablet Take 1 tablet (5 mg total) by mouth every 4 (four) hours as needed for Pain.    pantoprazole (PROTONIX) 40 MG tablet Take 1 tablet (40 mg total) by mouth once daily.    pregabalin (LYRICA) 50 MG capsule Take 1 capsule (50 mg total) by mouth every evening.    sertraline (ZOLOFT) 25 MG tablet Take 1 tablet (25 mg total) by mouth once daily.    sodium bicarbonate 650 MG tablet Take 1 tablet (650 mg total) by mouth 2 (two) times daily.    temazepam (RESTORIL) 30 mg capsule Take 1 capsule (30 mg total) by mouth nightly as needed for Insomnia.    tiZANidine (ZANAFLEX) 2 MG tablet Take 2 tablets (4 mg total) by mouth  2 (two) times a day.    vitamin E 400 UNIT capsule Take 400 Units by mouth once daily.    dextromethorphan-guaiFENesin  mg (MUCINEX DM)  mg per 12 hr tablet Take 1 tablet by mouth 2 (two) times daily. for 10 days    mupirocin (BACTROBAN) 2 % ointment Apply topically 2 (two) times daily.    ondansetron (ZOFRAN) 4 MG tablet Take 1 tablet (4 mg total) by mouth every 12 (twelve) hours as needed for Nausea.    oxyCODONE-acetaminophen (PERCOCET) 5-325 mg per tablet Take 1 tablet by mouth every 4 (four) hours as needed for Pain.    polyethylene glycol (GLYCOLAX) 17 gram PwPk Take 17 g by mouth once daily.     Family History       Problem Relation (Age of Onset)    Breast cancer Mother, Maternal Grandmother    Cancer Mother (38)    Cataracts Cousin    Heart disease Maternal Grandmother    Hypertension Son          Tobacco Use    Smoking status: Never     Passive exposure: Never    Smokeless tobacco: Never   Substance and Sexual Activity    Alcohol use: Never     Alcohol/week: 0.0 standard drinks of alcohol    Drug use: No    Sexual activity: Not Currently     Partners: Male     Birth control/protection: See Surgical Hx     Review of Systems   Constitutional: Positive for malaise/fatigue.   HENT: Negative.     Eyes: Negative.    Cardiovascular:  Positive for chest pain (atypical) and near-syncope.   Respiratory: Negative.     Endocrine: Negative.    Hematologic/Lymphatic: Negative.    Skin: Negative.    Musculoskeletal:  Positive for arthritis and joint pain.   Gastrointestinal: Negative.    Genitourinary: Negative.    Neurological:  Positive for dizziness and light-headedness.   Psychiatric/Behavioral: Negative.     Allergic/Immunologic: Negative.      Objective:     Vital Signs (Most Recent):  Temp: 98.5 °F (36.9 °C) (05/13/25 0908)  Pulse: 98 (05/13/25 0913)  Resp: 16 (05/13/25 0908)  BP: 138/74 (05/13/25 0908)  SpO2: 96 % (05/13/25 0908) Vital Signs (24h Range):  Temp:  [97 °F (36.1 °C)-98.5 °F (36.9 °C)]  98.5 °F (36.9 °C)  Pulse:  [] 98  Resp:  [16-20] 16  SpO2:  [94 %-98 %] 96 %  BP: (101-162)/(52-85) 138/74     Weight: 73.8 kg (162 lb 11.2 oz)  Body mass index is 29.76 kg/m².    SpO2: 96 %         Intake/Output Summary (Last 24 hours) at 5/13/2025 0977  Last data filed at 5/13/2025 0410  Gross per 24 hour   Intake 250 ml   Output 1700 ml   Net -1450 ml       Lines/Drains/Airways       Peripheral Intravenous Line  Duration                  Peripheral IV - Single Lumen 05/12/25 1934 20 G Right Antecubital <1 day                     Physical Exam  Vitals and nursing note reviewed.   Constitutional:       General: She is not in acute distress.     Appearance: Normal appearance. She is well-developed. She is not diaphoretic.   HENT:      Head: Normocephalic and atraumatic.   Eyes:      General:         Right eye: No discharge.         Left eye: No discharge.      Pupils: Pupils are equal, round, and reactive to light.   Cardiovascular:      Rate and Rhythm: Normal rate and regular rhythm.      Heart sounds: Normal heart sounds, S1 normal and S2 normal. No murmur heard.     Comments: Tenderness to chest wall  Pulmonary:      Effort: Pulmonary effort is normal. No respiratory distress.      Breath sounds: Normal breath sounds.   Musculoskeletal:      Right lower leg: No edema.      Left lower leg: No edema.   Skin:     General: Skin is warm and dry.      Findings: No erythema.   Neurological:      Mental Status: She is alert and oriented to person, place, and time.   Psychiatric:         Mood and Affect: Mood normal.         Behavior: Behavior normal.         Thought Content: Thought content normal.          Significant Labs: CMP   Recent Labs   Lab 05/12/25 1934 05/13/25  0442    140   K 4.4 4.2    108   CO2 21* 22*   GLU 97 104   BUN 45* 43*   CREATININE 3.9* 3.5*   CALCIUM 9.5 8.4*   PROT 9.0*  --    ALBUMIN 3.5  --    BILITOT 0.4  --    ALKPHOS 195*  --    AST 36  --    ALT 28  --    ANIONGAP 14 10  "  , CBC   Recent Labs   Lab 05/12/25  1934 05/13/25  0441   WBC 3.80* 3.90   HGB 9.5* 7.6*   HCT 29.2* 23.4*    207   , Troponin No results for input(s): "TROPONINIHS" in the last 48 hours., and All pertinent lab results from the last 24 hours have been reviewed.    Significant Imaging: Echocardiogram: Transthoracic echo (TTE) complete (Cupid Only):   Results for orders placed or performed during the hospital encounter of 04/24/25   Echo   Result Value Ref Range    LV Diastolic Volume 71 mL    Echo EF Estimated 40 %    LV Systolic Volume 43 mL    IVS 0.9 0.6 - 1.1 cm    LVIDd 4.0 3.5 - 6.0 cm    LVIDs 3.3 2.1 - 4.0 cm    LVOT diameter 2.0 cm    PW 0.9 0.6 - 1.1 cm    AV LVOT peak gradient 3 mmHg    LVOT mn grad 2 mmHg    LVOT peak jacky 0.9 m/s    LVOT peak VTI 20.6 cm    RV- marie basal diam 3.4 cm    AV valve area 4.3 cm2    AV area by cont VTI 4.3 cm2    AV peak gradient 3 mmHg    AV mean gradient 1 mmHg    Ao peak jacky 0.8 m/s    Ao VTI 14.9 cm    MV Peak A Jacky 0.59 m/s    E wave deceleration time 138 ms    MV Peak E Jacky 0.72 m/s    E/A ratio 1.22     LV LATERAL E/E' RATIO 9.0     LV SEPTAL E/E' RATIO 12.0     TDI LATERAL 0.08 m/s    TDI SEPTAL 0.06 m/s    TV peak gradient 26 mmHg    TR Max Jacky 2.5 m/s    Ascending aorta 2.4 cm    STJ 3.1 cm    Sinus 3.11 cm    TAPSE 0.9 cm    BSA 1.74 m2    LVOT stroke volume 64.7 cm3    LV Systolic Volume Index 25.3 mL/m2    LV Diastolic Volume Index 41.76 mL/m2    LVOT area 3.1 cm2    FS 17.5 (A) 28 - 44 %    Left Ventricle Relative Wall Thickness 0.45 cm    LV mass 109.7 g    LV Mass Index 64.5 g/m2    E/E' ratio 10 m/s    RV/LV Ratio 0.85 cm    AV Velocity Ratio 1.13     AV index (prosthetic) 1.38     PRESLEY by Velocity Ratio 3.5 cm²    Mean e' 0.07 m/s    ZLVIDS 0.95     ZLVIDD -1.67     EF 45 %    TV resting pulmonary artery pressure 28 mmHg    RV TB RVSP 6 mmHg    Est. RA pres 3 mmHg    Narrative      S/P heart transplant 1993.    Left Ventricle: The left ventricle is " normal in size. Normal wall   thickness. There is concentric remodeling. There is mildly reduced   systolic function. Ejection fraction is approximately 45%. Mild global   hypokinesis present. Contractility is better preserved in the basal   segements. There is normal diastolic function.    Right Ventricle: The right ventricle is normal in size. Systolic   function is moderately reduced. TAPSE is 0.9 cm.    Tricuspid Valve: There is mild to moderate regurgitation.    Pulmonary Artery: The estimated pulmonary artery systolic pressure is   28 mmHg.    IVC/SVC: Normal venous pressure at 3 mmHg.     , EKG: Reviewed, and X-Ray: CXR: X-Ray Chest 1 View (CXR): No results found for this visit on 05/12/25. and X-Ray Chest PA and Lateral (CXR): No results found for this visit on 05/12/25.  Assessment and Plan:   Patient who presents s/p near syncope, likely due to hypotension/IVVD, exacerbated by anemia. Elevated troponin noted/flat, secondary to demand ischemia from above issues. Consider transfusion. CP is atypical and reproducible on exam, continue pain meds/consider Lidocaine patch.     * Syncope  -Presents s/p near syncopal episode, denies LOC  -Trop elevated but flat  -Orthostatic vitals negative  -Noted to have GRACE and hypotension upon admission-->likely cause of episode  -H/H also dipped to 7.6/23.4 this AM, needs further and transfusion w/u per primary team  -Recent TTE 4/2025 with normal EF; prior Cardiac PET 11/2024 negative for ischemia/scar    Normocytic anemia  -H/H dipped to 7.6/23.4, consider transfusion  -Mgmt per primary team    Elevated troponin  -Troponin 0.139>0.095>0.106  -Elevation likely secondary to demand ischemia from GRACE, hypotension, anemia  -Recent TTE 4/2025 with normal EF  -Prior Cardiac PET 11/2024 negative for ischemia/scar  -CP on exam is atypical and reproducible  -Continue OMT as tolerated    Acute kidney injury superimposed on stage 4 chronic kidney disease  -Improved with IVF  -Mgmt per  primary team    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  -Continue OMT as tolerated    Chest pain  -Atypical, MSK in nature likely due to recent CPR    Essential hypertension  -Resume home regimen as tolerated        VTE Risk Mitigation (From admission, onward)           Ordered     heparin (porcine) injection 5,000 Units  Every 8 hours         05/12/25 2146     IP VTE HIGH RISK PATIENT  Once         05/12/25 2146     Place sequential compression device  Until discontinued         05/12/25 2146                    Thank you for your consult. I will follow-up with patient. Please contact us if you have any additional questions.    Nilda Rehman PA-C  Cardiology   [facility] - Telemetry ([facility])

## 2025-05-13 NOTE — ASSESSMENT & PLAN NOTE
Chronic, controlled.  Latest blood pressure and vitals reviewed-   Temp:  [97 °F (36.1 °C)-98.4 °F (36.9 °C)]   Pulse:  []   Resp:  [17-20]   BP: (101-162)/(52-85)   SpO2:  [94 %-98 %] .   Home meds for hypertension were reviewed and noted below.   Hypertension Medications              carvediloL (COREG) 3.125 MG tablet Take 1 tablet (3.125 mg total) by mouth 2 (two) times daily with meals.    furosemide (LASIX) 40 MG tablet Take 1 tablet (40 mg total) by mouth once daily.    NIFEdipine (PROCARDIA-XL) 60 MG (OSM) 24 hr tablet Take 1 tablet (60 mg total) by mouth once daily.    nitroGLYCERIN (NITROSTAT) 0.4 MG SL tablet PLACE 1 TABLET UNDER THE TONGUE EVERY 5 MINS AS NEEDED FOR CHEST PAIN FOR UP TO 3 DOSES.IF CONTINUED HEART PAIN/PRESSURE AFTER            While in the hospital, will manage blood pressure as follows; Continue home antihypertensive regimen    Will utilize p.r.n. blood pressure medication only if patient's blood pressure greater than  160/90 and she develops symptoms such as worsening chest pain or shortness of breath.

## 2025-05-13 NOTE — H&P
[facility]  History & Physical    Patient Name: Nadia Damon  MRN: [id number]  Patient Class: IP- Inpatient  Admission Date: 5/12/2025  Attending Physician: Shamar Galdamez MD  Primary Care Provider: Elis Wick MD         Patient information was obtained from patient, past medical records, and ER records.     Subjective:     Principal Problem:Syncope    Chief Complaint:   Chief Complaint   Patient presents with    Fatigue     Per EMS, patient sent from [facility] for evaluation after c/o feeling light-headed and weak        HPI: Nadia Damon is a 69 y.o. female with a PMH  has a past medical history of Abdominal wall hernia, Abnormal mammogram (10/12/2021), Acute cystitis without hematuria (05/10/2022), Acute ischemic right middle cerebral artery (MCA) stroke (07/20/2024), Adverse drug reaction (11/12/2024), GRACE (acute kidney injury) (11/22/2021), Anxiety, Aphasia (07/19/2024), Arthritis, Breast cancer in female (08/2021), Breast mass, right (11/13/2024), Bronchitis (08/18/2016), C. difficile colitis (11/29/2021), Cellulitis of axilla, left (12/23/2021), Chronic diastolic heart failure (12/16/2021), Chronic kidney disease, Chronic midline low back pain without sciatica (06/18/2018), CKD (chronic kidney disease) stage 4, GFR 15-29 ml/min (04/20/2016), Closed nondisplaced fracture of distal phalanx of left great toe with routine healing (10/22/2018), Coronary artery disease (1993), COVID-19 in immunocompromised patient (02/26/2024), Cystitis (05/10/2022), Depression, Encounter for blood transfusion, Encounter for palliative care (10/14/2024), Fibromyalgia, Heart failure, Heart transplanted (1993), History of hyperparathyroidism; Hyperparathyroidism, secondary renal, Hypertension, Immune disorder, 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), Impaired mobility (07/28/2022), Iron deficiency anemia (08/15/2017), Kidney stones, Obesity, Obesity  (BMI 30.0-34.9) (07/22/2019), Other osteoporosis without current pathological fracture (08/30/2019), Other pancytopenia (05/06/2024), Parotitis, acute (09/19/2023), Pharyngitis (01/09/2019), Pneumonia due to infectious organism (03/14/2024), PONV (postoperative nausea and vomiting), Severe sepsis (11/22/2021), Shingles (2003 approx), Stage 4 chronic kidney disease (11/22/2021), Subclinical hypothyroidism (06/16/2023), Thrombocytopenia, unspecified (11/29/2021), Trouble in sleeping, Unresponsiveness (11/13/2024), and Urinary incontinence.presented to the Emergency Department for evaluation of syncopal event and low bp while at f/u appointment at PCP (Dr. Wick) office. Pt c/o chest pain and soreness which began after undergoing chest compressions on 4/24 due to cardiac arrest. Pt states that she is unsure how long chest compressions lasted. She states that she was sent from [facility] to have back surgery but the procedure was postponed due to concerns of chest pain. After 3 EKGs, the surgeon cancelled the procedure on 4/24.  Pt reports while awaiting for a Lyft, she felt dizzy and light-headed. Pt states that the  of the clinic ran over to catch her and prevented her from hitting her head. Dr. Wick re-evaluated the pt and advised pt to come into the ER. PT confirms that she had a heart transplant years ago. She states that her transplant doctor has been denying her of any further surgeries due to Stage 5 Kidney Failure. She confirms that she is aware of dialysis being her next step. Symptoms are constant and moderate in severity. No mitigating or exacerbating factors reported. No associated sxs included. Patient denies any fever, chills cough, rhinorrhea, blood in stool, melena, or diarrhea. No prior Tx specified. No further complaints or concerns at this time. Dr. Tubbs with Cardiac Transplant team is ok with keeping patient in BR and can obtain cyclosporine level in am with gentle iv hydration.    ER  workup revealed CBC to be at baseline with H/H of 9.5/29.2.  CMP revealed BUN/creatinine 45/3.9 with EGFR of 12 (previously 40/2.9 with EGFR 17 on 05/07/2025).  BNP 4 6.  Initial troponin 0.139 with repeat troponin 0.095.  UA with +3 leukocyte esterase, 17 WBCs, rare bacteria.  EKG revealed normal sinus rhythm with right bundle-branch block with a ventricular rate of 75 beats per minute and a QT/QTC of 452/504.  Orthostatics negative.  Vital signs stable.  Hospital Medicine consulted to admit patient for gentle IV hydration for GRACE superimposed on CKD and syncope.  Patient in agreement with treatment plan.  Patient admitted under inpatient status.      PCP: Elis Wick      Past Medical History:   Diagnosis Date    Abdominal wall hernia     CT Renal 6/11/2018---Small fat containing superior ventral abdominal wall hernia at the epicardial pacing lead site.    Abnormal mammogram 10/12/2021    Acute cystitis without hematuria 05/10/2022    Acute ischemic right middle cerebral artery (MCA) stroke 07/20/2024    Adverse drug reaction 11/12/2024    GRACE (acute kidney injury) 11/22/2021    Anxiety     Aphasia 07/19/2024    Arthritis     ZEN HIPS    Breast cancer in female 08/2021    LEFT BREAST    Breast mass, right 11/13/2024    RIGHT BREAST MASS (Problem)      Bronchitis 08/18/2016    Never smoked      C. difficile colitis 11/29/2021    Cellulitis of axilla, left 12/23/2021    Chronic diastolic heart failure 12/16/2021    Chronic kidney disease     stage 4, GFR 15-29 ml/min    Chronic midline low back pain without sciatica 06/18/2018    CKD (chronic kidney disease) stage 4, GFR 15-29 ml/min 04/20/2016    US Retro   5/16/2018---Mild cortical thinning and increased cortical echogenicity.  Findings can be seen with medical renal disease.  8/31/216--- Echogenic kidneys with diffuse cortical thinning suggesting  medical renal disease. 2 complex right renal cortical cysts.      Closed nondisplaced fracture of distal  phalanx of left great toe with routine healing 10/22/2018    Coronary artery disease 1993    heart transplant    COVID-19 in immunocompromised patient 02/26/2024    Cystitis 05/10/2022    Depression     Encounter for blood transfusion     Encounter for palliative care 10/14/2024    Fibromyalgia     on Lyrica    Heart failure     native heart cardiomyopathy    Heart transplanted 1993    due to cardiomyopathy    History of hyperparathyroidism; Hyperparathyroidism, secondary renal     PT DENIES    Hypertension     Immune disorder     anti rejection meds    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    Impaired mobility 07/28/2022    Iron deficiency anemia 08/15/2017    Kidney stones     passed per pt    Obesity     Obesity (BMI 30.0-34.9) 07/22/2019    Other osteoporosis without current pathological fracture 08/30/2019    Other pancytopenia 05/06/2024    Parotitis, acute 09/19/2023    Pharyngitis 01/09/2019    Pneumonia due to infectious organism 03/14/2024    PONV (postoperative nausea and vomiting)     Severe sepsis 11/22/2021    Shingles 2003 approx    left leg    Stage 4 chronic kidney disease 11/22/2021    Subclinical hypothyroidism 06/16/2023    Thrombocytopenia, unspecified 11/29/2021    Trouble in sleeping     Unresponsiveness 11/13/2024    Urinary incontinence        Past Surgical History:   Procedure Laterality Date    bladder implant Right 06/11/2024    BLADDER SURGERY  2015 approx    mesh - Dr Everett then 2nd reconstructive sx Dr Onofre    BREAST BIOPSY Bilateral     NEGATIVE    BREAST BIOPSY Right 10/31/2022    benign    BREAST LUMPECTOMY Left 2021    BREAST SURGERY Left 09/28/2015    Bx - benign    BREAST SURGERY Right 12/2015    Bx benign    CARDIAC PACEMAKER REMOVAL Left 06/26/2014    Pacer defirillator removed. Put in 1993 aat time of heart transplant    CARPAL TUNNEL RELEASE Left 03/03/2015    Dr. Hall    COLONOSCOPY N/A 02/25/2021    Procedure: COLONOSCOPY;  Surgeon: Freida GLORIA  MD Ashley;  Location: [facility] ENDO;  Service: Endoscopy;  Laterality: N/A;    CYSTOCELE REPAIR      Twice with mesh removal    ECHOCARDIOGRAM,TRANSESOPHAGEAL N/A 4/26/2025    Procedure: Transesophageal echo (AYAAN) intra-procedure log documentation;  Surgeon: Barron Chinchilla MD;  [facility];  Service: Cardiothoracic;  Laterality: N/A;    EPIDURAL STEROID INJECTION INTO CERVICAL SPINE N/A 02/02/2023    Procedure: T11/T12 IL HELLEN;  Surgeon: Jassi Pierer MD;  Location: [facility] PAIN MGT;  Service: Pain Management;  Laterality: N/A;    EPIDURAL STEROID INJECTION INTO CERVICAL SPINE N/A 9/16/2024    Procedure: T11/T12 IL HELLEN;  Surgeon: Jassi Pierre MD;  Location: [facility] PAIN MGT;  Service: Pain Management;  Laterality: N/A;    HEART TRANSPLANT  1993    HERNIA REPAIR Right 1971 approx    Inguinal    HYSTERECTOMY  1983    vag hyst /LSO     IMPLANTATION OF PERMANENT SACRAL NERVE STIMULATOR N/A 06/11/2024    Procedure: INSERTION, NEUROSTIMULATOR, PERMANENT, SACRAL;  Surgeon: Sami Cochran MD;  Location: [facility] OR;  Service: Urology;  Laterality: N/A;    INCISION AND DRAINAGE OF ABSCESS Left 12/24/2021    Procedure: INCISION AND DRAINAGE, ABSCESS;  Surgeon: Joseph Longo MD;  Location: [facility] OR;  Service: General;  Laterality: Left;    INJECTION OF ANESTHETIC AGENT AROUND MEDIAL BRANCH NERVES INNERVATING LUMBAR FACET JOINT Right 10/19/2022    Procedure: Right L4/L5 and L5/S1 MBB;  Surgeon: Jassi Pierre MD;  Location: [facility] PAIN MGT;  Service: Pain Management;  Laterality: Right;    INJECTION OF ANESTHETIC AGENT AROUND MEDIAL BRANCH NERVES INNERVATING LUMBAR FACET JOINT Right 11/09/2022    Procedure: Right L4/L5 and L5/S1 MBB;  Surgeon: Jassi Pierre MD;  Location: [facility] PAIN MGT;  Service: Pain Management;  Laterality: Right;    INJECTION OF ANESTHETIC AGENT AROUND MEDIAL BRANCH NERVES INNERVATING LUMBAR FACET JOINT Left 2/6/2025    Procedure: Left L4-5 and L5-S1 MBB #1 with local;   Surgeon: Jassi Pierre MD;  Location: [facility] PAIN MGT;  Service: Pain Management;  Laterality: Left;    INJECTION OF ANESTHETIC AGENT AROUND MEDIAL BRANCH NERVES INNERVATING LUMBAR FACET JOINT Left 3/19/2025    Procedure: Left L4-5 and L5-S1 MBB #2 with local;  Surgeon: Jassi Pierre MD;  Location: [facility] PAIN MGT;  Service: Pain Management;  Laterality: Left;    INJECTION OF ANESTHETIC AGENT INTO SACROILIAC JOINT Right 08/22/2022    Procedure: Right SIJ Injection Right L5/S1 Facte Injection;  Surgeon: Jassi Pierre MD;  Location: [facility] PAIN MGT;  Service: Pain Management;  Laterality: Right;    INSERTION OF TUNNELED CENTRAL VENOUS CATHETER (CVC) WITH SUBCUTANEOUS PORT N/A 11/09/2021    Procedure: [id number];  Surgeon: Christoph Douglas MD;  Location: [facility] OR;  Service: General;  Laterality: N/A;    RADIOFREQUENCY ABLATION Right 12/5/2024    Procedure: Right L4-5 and L5-S1 RFA;  Surgeon: Jassi Pierre MD;  Location: [facility] PAIN MGT;  Service: Pain Management;  Laterality: Right;    RADIOFREQUENCY THERMOCOAGULATION Right 12/07/2022    Procedure: Right L4/L5 and L5/S1 Lumbar RFA;  Surgeon: Jassi Pierre MD;  Location: [facility] PAIN MGT;  Service: Pain Management;  Laterality: Right;    REMOVAL OF ELECTRODE LEAD OF SACRAL NERVE STIMULATOR N/A 2/18/2025    Procedure: REMOVAL, ELECTRODE LEAD, SACRAL NERVE STIMULATOR;  Surgeon: Sami Cochran MD;  Location: [facility] OR;  Service: Urology;  Laterality: N/A;    REMOVAL OF VASCULAR ACCESS PORT      ROBOT-ASSISTED LAPAROSCOPIC ABDOMINAL SACROCOLPOPEXY N/A 08/10/2023    Procedure: ROBOTIC SACROCOLPOPEXY, ABDOMEN;  Surgeon: PRANAY Villalobos MD;  Location: [facility] OR;  Service: OB/GYN;  Laterality: N/A;    ROBOT-ASSISTED LAPAROSCOPIC OOPHORECTOMY Right 08/10/2023    Procedure: ROBOTIC OOPHORECTOMY;  Surgeon: PRANAY Villalobos MD;  Location: [facility] OR;  Service: OB/GYN;  Laterality: Right;    SENTINEL LYMPH NODE BIOPSY Left 10/12/2021    Procedure: BIOPSY,  LYMPH NODE, SENTINEL;  Surgeon: Christoph Douglas MD;  Location: [facility] OR;  Service: General;  Laterality: Left;    TOE SURGERY      XI ROBOTIC URETHROPEXY N/A 08/10/2023    Procedure: XI ROBOTIC URETHROPEXY;  Surgeon: PRANAY Villalobos MD;  Location: [facility] OR;  Service: OB/GYN;  Laterality: N/A;       Review of patient's allergies indicates:   Allergen Reactions    Dobutamine Other (See Comments)     Severe Left sided chest pain after dosage administered for Dobutamine Stress Test; itching    Lisinopril Swelling and Rash    Hydrocodone-acetaminophen Nausea Only    Augmentin [amoxicillin-pot clavulanate] Diarrhea    Zyvox [linezolid] Nausea And Vomiting       No current facility-administered medications on file prior to encounter.     Current Outpatient Medications on File Prior to Encounter   Medication Sig    aspirin (ECOTRIN) 81 MG EC tablet Take 1 tablet (81 mg total) by mouth once daily.    calcitRIOL (ROCALTROL) 0.25 MCG Cap Take 1 capsule (0.25 mcg total) by mouth once daily.    carvediloL (COREG) 3.125 MG tablet Take 1 tablet (3.125 mg total) by mouth 2 (two) times daily with meals.    cycloSPORINE (NEORAL) 100 mg/mL microemulsion solution Take 0.5 mLs (50 mg total) by mouth every 12 (twelve) hours.    furosemide (LASIX) 40 MG tablet Take 1 tablet (40 mg total) by mouth once daily.    NIFEdipine (PROCARDIA-XL) 60 MG (OSM) 24 hr tablet Take 1 tablet (60 mg total) by mouth once daily.    nitroGLYCERIN (NITROSTAT) 0.4 MG SL tablet PLACE 1 TABLET UNDER THE TONGUE EVERY 5 MINS AS NEEDED FOR CHEST PAIN FOR UP TO 3 DOSES.IF CONTINUED HEART PAIN/PRESSURE AFTER    oxyCODONE (ROXICODONE) 5 MG immediate release tablet Take 1 tablet (5 mg total) by mouth every 4 (four) hours as needed for Pain.    pantoprazole (PROTONIX) 40 MG tablet Take 1 tablet (40 mg total) by mouth once daily.    pregabalin (LYRICA) 50 MG capsule Take 1 capsule (50 mg total) by mouth every evening.    sertraline (ZOLOFT) 25 MG tablet Take 1 tablet (25  mg total) by mouth once daily.    sodium bicarbonate 650 MG tablet Take 1 tablet (650 mg total) by mouth 2 (two) times daily.    temazepam (RESTORIL) 30 mg capsule Take 1 capsule (30 mg total) by mouth nightly as needed for Insomnia.    tiZANidine (ZANAFLEX) 2 MG tablet Take 2 tablets (4 mg total) by mouth 2 (two) times a day.    vitamin E 400 UNIT capsule Take 400 Units by mouth once daily.    dextromethorphan-guaiFENesin  mg (MUCINEX DM)  mg per 12 hr tablet Take 1 tablet by mouth 2 (two) times daily. for 10 days    mupirocin (BACTROBAN) 2 % ointment Apply topically 2 (two) times daily.    ondansetron (ZOFRAN) 4 MG tablet Take 1 tablet (4 mg total) by mouth every 12 (twelve) hours as needed for Nausea.    oxyCODONE-acetaminophen (PERCOCET) 5-325 mg per tablet Take 1 tablet by mouth every 4 (four) hours as needed for Pain.    polyethylene glycol (GLYCOLAX) 17 gram PwPk Take 17 g by mouth once daily.     Family History       Problem Relation (Age of Onset)    Breast cancer Mother, Maternal Grandmother    Cancer Mother (38)    Cataracts Cousin    Heart disease Maternal Grandmother    Hypertension Son          Tobacco Use    Smoking status: Never     Passive exposure: Never    Smokeless tobacco: Never   Substance and Sexual Activity    Alcohol use: Never     Alcohol/week: 0.0 standard drinks of alcohol    Drug use: No    Sexual activity: Not Currently     Partners: Male     Birth control/protection: See Surgical Hx     Review of Systems   Constitutional:  Negative for chills, diaphoresis, fatigue and fever.   Respiratory:  Negative for cough and shortness of breath.    Cardiovascular:  Negative for chest pain, palpitations and leg swelling.   Gastrointestinal:  Negative for abdominal pain, diarrhea, nausea and vomiting.   Neurological:  Positive for dizziness, syncope and light-headedness.   All other systems reviewed and are negative.    Objective:     Vital Signs (Most Recent):  Temp: 97.8 °F (36.6 °C)  (05/12/25 2210)  Pulse: 101 (05/12/25 2210)  Resp: 18 (05/13/25 0244)  BP: (!) 162/85 (05/12/25 2210)  SpO2: 98 % (05/12/25 2210) Vital Signs (24h Range):  Temp:  [97 °F (36.1 °C)-97.8 °F (36.6 °C)] 97.8 °F (36.6 °C)  Pulse:  [] 101  Resp:  [17-20] 18  SpO2:  [94 %-98 %] 98 %  BP: (101-162)/(52-85) 162/85     Weight: 73.8 kg (162 lb 11.2 oz)  Body mass index is 29.76 kg/m².     Physical Exam  Vitals and nursing note reviewed.   Constitutional:       General: She is awake. She is not in acute distress.     Appearance: Normal appearance. She is well-developed and well-groomed. She is not ill-appearing, toxic-appearing or diaphoretic.   HENT:      Head: Normocephalic and atraumatic.      Mouth/Throat:      Lips: Pink.      Mouth: Mucous membranes are moist.      Pharynx: Oropharynx is clear. Uvula midline.   Eyes:      Extraocular Movements: Extraocular movements intact.      Conjunctiva/sclera: Conjunctivae normal.      Pupils: Pupils are equal, round, and reactive to light.   Cardiovascular:      Rate and Rhythm: Normal rate and regular rhythm.      Heart sounds: Normal heart sounds. No murmur heard.  Pulmonary:      Effort: Pulmonary effort is normal.      Breath sounds: Normal breath sounds. No decreased breath sounds, wheezing, rhonchi or rales.   Abdominal:      General: Bowel sounds are normal.      Palpations: Abdomen is soft.      Tenderness: There is no abdominal tenderness. There is no right CVA tenderness, left CVA tenderness, guarding or rebound.   Musculoskeletal:      Cervical back: Normal range of motion and neck supple.      Right lower leg: No edema.      Left lower leg: No edema.      Comments: 5/5 strength throughout   Skin:     General: Skin is warm and dry.      Capillary Refill: Capillary refill takes less than 2 seconds.   Neurological:      General: No focal deficit present.      Mental Status: She is alert and oriented to person, place, and time. Mental status is at baseline.      GCS:  GCS eye subscore is 4. GCS verbal subscore is 5. GCS motor subscore is 6.      Cranial Nerves: Cranial nerves 2-12 are intact.      Sensory: Sensation is intact.      Motor: Motor function is intact.   Psychiatric:         Mood and Affect: Mood normal.         Speech: Speech normal.         Behavior: Behavior normal. Behavior is cooperative.              CRANIAL NERVES     CN III, IV, VI   Pupils are equal, round, and reactive to light.     LABS:  Recent Results (from the past 24 hours)   Brain natriuretic peptide    Collection Time: 05/12/25  7:34 PM   Result Value Ref Range     (H) 0 - 99 pg/mL   Comprehensive metabolic panel    Collection Time: 05/12/25  7:34 PM   Result Value Ref Range    Sodium 137 136 - 145 mmol/L    Potassium 4.4 3.5 - 5.1 mmol/L    Chloride 102 95 - 110 mmol/L    CO2 21 (L) 23 - 29 mmol/L    Glucose 97 70 - 110 mg/dL    BUN 45 (H) 8 - 23 mg/dL    Creatinine 3.9 (H) 0.5 - 1.4 mg/dL    Calcium 9.5 8.7 - 10.5 mg/dL    Protein Total 9.0 (H) 6.0 - 8.4 gm/dL    Albumin 3.5 3.5 - 5.2 g/dL    Bilirubin Total 0.4 0.1 - 1.0 mg/dL     (H) 40 - 150 unit/L    AST 36 11 - 45 unit/L    ALT 28 10 - 44 unit/L    Anion Gap 14 8 - 16 mmol/L    eGFR 12 (L) >60 mL/min/1.73/m2   Troponin I    Collection Time: 05/12/25  7:34 PM   Result Value Ref Range    Troponin-I 0.139 (H) <=0.026 ng/mL   Magnesium    Collection Time: 05/12/25  7:34 PM   Result Value Ref Range    Magnesium  1.9 1.6 - 2.6 mg/dL   CBC with Differential    Collection Time: 05/12/25  7:34 PM   Result Value Ref Range    WBC 3.80 (L) 3.90 - 12.70 K/uL    RBC 3.29 (L) 4.00 - 5.40 M/uL    HGB 9.5 (L) 12.0 - 16.0 gm/dL    HCT 29.2 (L) 37.0 - 48.5 %    MCV 89 82 - 98 fL    MCH 28.9 27.0 - 31.0 pg    MCHC 32.5 32.0 - 36.0 g/dL    RDW 15.5 (H) 11.5 - 14.5 %    Platelet Count 238 150 - 450 K/uL    MPV 9.3 9.2 - 12.9 fL    Nucleated RBC 0 <=0 /100 WBC    Neut % 60.8 38 - 73 %    Lymph % 25.0 18 - 48 %    Mono % 10.0 4 - 15 %    Eos % 3.4 <=8 %     Basophil % 0.3 <=1.9 %    Imm Grans % 0.5 0.0 - 0.5 %    Neut # 2.31 1.8 - 7.7 K/uL    Lymph # 0.95 (L) 1 - 4.8 K/uL    Mono # 0.38 0.3 - 1 K/uL    Eos # 0.13 <=0.5 K/uL    Baso # 0.01 <=0.2 K/uL    Imm Grans # 0.02 0.00 - 0.04 K/uL   Light Blue Top Hold    Collection Time: 05/12/25  7:37 PM   Result Value Ref Range    Extra Tube Hold for add-ons.    Gold Top Hold    Collection Time: 05/12/25  7:37 PM   Result Value Ref Range    Extra Tube Hold for add-ons.    Urinalysis, Reflex to Urine Culture Urine, Clean Catch    Collection Time: 05/12/25  8:00 PM    Specimen: Urine, Clean Catch   Result Value Ref Range    Color, UA Yellow Straw, [place], Yellow, Light-Orange    Appearance, UA Hazy (A) Clear    pH, UA 6.0 5.0 - 8.0    Spec Grav UA 1.010 1.005 - 1.030    Protein, UA 1+ (A) Negative    Glucose, UA Negative Negative    Ketones, UA Negative Negative    Bilirubin, UA Negative Negative    Blood, UA Negative Negative    Nitrites, UA Negative Negative    Urobilinogen, UA Negative <2.0 EU/dL    Leukocyte Esterase, UA 3+ (A) Negative   GREY TOP URINE HOLD    Collection Time: 05/12/25  8:00 PM   Result Value Ref Range    Extra Tube Hold for add-ons.    Urinalysis Microscopic    Collection Time: 05/12/25  8:00 PM   Result Value Ref Range    RBC, UA <1 0 - 4 /HPF    WBC, UA 17 (H) 0 - 5 /HPF    WBC Clumps, UA None None, Rare    Bacteria, UA Rare None, Rare, Occasional /HPF    Yeast, UA None None /HPF    Squamous Epithelial Cells, UA 8 /HPF    Non-Squamous Epithelial Cells 1 (H) <=0 /HPF    Hyaline Casts, UA 0 0 - 1 /LPF    Epithelial Casts 0 None /lpf    WBC Casts 0 None /LPF    RBC Casts 0 None /LPF    Granular Casts 0 None  /LPF    Fatty Casts, UA 0 None /LPF    Waxy Casts, UA 0 None /lpf    Triple Phosphate Crystals, UA None None, Rare, Occasional, Few, Moderate    Calcium Oxalate Crystals, UA None None, Rare, Occasional, Few, Moderate    Calcium Phosphate Crystal None Rare, None, Moderate, Few, Occasional    Calcium  Carbonate Crystal None Occasional, Rare, Few, None, Moderate    Ammonium Urate Crystals None Moderate, Occasional, Rare, Few, None    Uric Acid Crystals, UA None None, Rare, Occasional, Few, Moderate    Amorphous, UA None None, Occasional, Few, Moderate, Rare    Unclassified Crystals None None, Rare, Occasional, Few, Moderate    Trichomonas, UA None None /HPF    Other, UA None None    Microscopic Comment     Troponin I    Collection Time: 05/12/25 10:36 PM   Result Value Ref Range    Troponin-I 0.095 (H) <=0.026 ng/mL   Troponin I    Collection Time: 05/13/25  2:00 AM   Result Value Ref Range    Troponin-I 0.106 (H) <=0.026 ng/mL       RADIOLOGY  X-Ray Chest AP Portable  Result Date: 5/7/2025  EXAMINATION: XR CHEST AP PORTABLE CLINICAL HISTORY: Acute chest pain, Chest Pain; COMPARISON: 05/03/2025 chest x-ray FINDINGS: Sternal wires are present.  Epicardial lead is again noted.  Cardiac size is enlarged.  Chronic left pericardial scarring is noted. No acute abnormality is seen. Left axillary surgical clips.     Stable chest x-ray with no acute abnormality. Electronically signed by: Gunner Rao MD Date:    05/07/2025 Time:    10:48    X-Ray Chest AP Portable  Result Date: 5/4/2025  EXAMINATION: XR CHEST AP PORTABLE CLINICAL HISTORY: shortness of breath, cough; TECHNIQUE: Single frontal view of the chest was performed. COMPARISON: 05/03/2025 FINDINGS: Prior cardiac surgery.  Similar fractured upper-most sternotomy wire.  Overlying monitoring leads.  Allowing for some difference in position and technique, heart and lungs appear similar without significant interval detrimental change.  No gross pneumothorax.     As above. Electronically signed by: Jose Ramon Benton Date:    05/04/2025 Time:    08:36    X-Ray Chest AP Portable  Result Date: 5/3/2025  EXAMINATION: XR CHEST AP PORTABLE CLINICAL HISTORY: [place] on Pulmonary edema; TECHNIQUE: Single frontal view of the chest was performed. COMPARISON: 04/03/2025  FINDINGS: Prior cardiac surgery with fractured upper most sternotomy wire, similar.  Overlying monitoring leads.  Heart and lungs appear similar without significant interval detrimental change.  No gross pneumothorax.     As above. Electronically signed by: Jose Ramon Benton Date:    05/03/2025 Time:    18:14    X-Ray Chest 1 View  Result Date: 4/30/2025  EXAMINATION: XR CHEST 1 VIEW CLINICAL HISTORY: chf; TECHNIQUE: Single frontal view of the chest was performed. COMPARISON: N 04/29/2025 one FINDINGS: The ET and NG tube has been removed.  Continued mild cardiomegaly.  No significant airspace consolidation or pleural effusion identified.     No significant changes Electronically signed by: Omar Rosenberg MD Date:    04/30/2025 Time:    08:56    X-Ray Chest 1 View  Result Date: 4/29/2025  EXAMINATION: XR CHEST 1 VIEW CLINICAL HISTORY: chf; TECHNIQUE: Single frontal view of the chest was performed. COMPARISON: 04/28/2025 FINDINGS: ET tube distal tip within the mid trachea, enteric tube distal tip within the stomach.  Sternotomy wires. The cardiac silhouette is prominent.  The pulmonary vascularity is slightly increased centrally.  No acute focal area of airspace consolidation.  No pleural effusion. The osseous structures appear normal.     No significant interval change. Electronically signed by: Angela Glez MD Date:    04/29/2025 Time:    08:14    Echo  Result Date: 4/28/2025    S/P heart transplant 1993.   Left Ventricle: The left ventricle is normal in size. Normal wall thickness. There is concentric remodeling. There is mildly reduced systolic function. Ejection fraction is approximately 45%. Mild global hypokinesis present. Contractility is better preserved in the basal segements. There is normal diastolic function.   Right Ventricle: The right ventricle is normal in size. Systolic function is moderately reduced. TAPSE is 0.9 cm.   Tricuspid Valve: There is mild to moderate regurgitation.   Pulmonary  Artery: The estimated pulmonary artery systolic pressure is 28 mmHg.   IVC/SVC: Normal venous pressure at 3 mmHg.     X-Ray Chest 1 View  Result Date: 4/28/2025  EXAMINATION: XR CHEST 1 VIEW CLINICAL HISTORY: chf; FINDINGS: Chest one view: ET tip T4, NG tip below diaphragm.  There is postoperative change.  There is cardiomegaly mild edema.     Cardiomegaly mild edema. Electronically signed by: Rudy Sierra MD Date:    04/28/2025 Time:    09:47    XR NG/OG tube placement check, non-radiologist performed  Result Date: 4/27/2025  EXAMINATION: XR NG/OG TUBE PLACEMENT CHECK, NON-RADIOLOGIST PERFORMED CLINICAL HISTORY: OG; TECHNIQUE: Single view of the abdomen COMPARISON: 04/25/2025 FINDINGS: Nasogastric tube overlies the stomach.     See above Electronically signed by: Ty Murillo Date:    04/27/2025 Time:    12:35    X-Ray Chest AP Portable  Result Date: 4/27/2025  EXAMINATION: XR CHEST AP PORTABLE CLINICAL HISTORY: ET tube placement; TECHNIQUE: Single frontal view of the chest was performed. COMPARISON: 04/25/2025 FINDINGS: Endotracheal tube tip lies approximately 5 cm above the tomasa.  The fibular pads are in place.  The cardiomediastinal silhouette is prominent, stable in configuration noting calcification of the aorta..  There is no pleural effusion.  The trachea is midline.  The lungs are symmetrically expanded bilaterally with coarse interstitial attenuation bilaterally, similar to the previous exam.  There is left basilar subsegmental atelectasis..  No large focal consolidation seen.  There is no pneumothorax.  The osseous structures are unchanged..     As above Electronically signed by: Jassi Bhakta MD Date:    04/27/2025 Time:    12:05    CT Head Without Contrast  Result Date: 4/27/2025  EXAMINATION: CT HEAD WITHOUT CONTRAST CLINICAL HISTORY: post arrest; TECHNIQUE: Multiple sequential 5 mm axial images of the head without contrast.  Coronal and sagittal reformatted imaging from the axial acquisition.  COMPARISON: 11/12/2024 FINDINGS: Study is distorted by artifact from motion and external metal, within limits of the study there is no evidence for acute intracranial hemorrhage or sulcal effacement to suggest large territory recent infarction.  Ventricles relatively stable without hydrocephalus.  No midline shift or significant mass effect.  Visualized paranasal sinuses and mastoid air cells are clear.     No acute intracranial findings as detailed above specifically without evidence for acute intracranial hemorrhage or evidence for large territory recent infarction. Please note MRI would be of greater sensitivity for evaluation for hypoxic ischemic injury and further evaluation with MRI as warranted if patient compatible. Electronically signed by: Naveen Zaldivar DO Date:    04/27/2025 Time:    11:57    Transesophageal echo (AYAAN)  Result Date: 4/26/2025    AYAAN performed for suspicion of aortic dissection.   Aorta: The aortic root is normal in size measuring 3.4 cm. The aortic root at ST junction is normal. The ascending aorta is normal in size measuring 3.6 cm. Grade 2 atherosclerosis of the descending aorta. No dissection flap visualized.   Left Ventricle: The left ventricle is normal in size. Normal wall thickness. There is normal systolic function. Ejection fraction is approximately 55%.   Right Ventricle: The right ventricle is normal in size. Wall thickness is normal. Systolic function is borderline low.   Tricuspid Valve: There is mild regurgitation.     X-Ray Abdomen AP 1 View  Result Date: 4/25/2025  EXAMINATION: XR ABDOMEN AP 1 VIEW CLINICAL HISTORY: abdominal pain; TECHNIQUE: AP View(s) of the abdomen was performed. COMPARISON: June 27, 2024 and May 2018 FINDINGS: There is scattered stool and bowel gas both small and large bowel.  Presumed external pacer lead overlies the upper abdomen.  Cylindrical density overlies the left iliac wing as well as an adjacent tubing.  Degenerative change in the spine.      Scattered stool and bowel gas both small and large bowel.  Additional findings above. Electronically signed by: Sp Edward MD Date:    04/25/2025 Time:    08:32    Echo  Result Date: 4/25/2025    Post cardiac transplantation study - OHTx 5/27/1993 at [facility]   Left Ventricle: The left ventricle is normal in size. Normal wall thickness. There is concentric remodeling. There is normal systolic function with a visually estimated ejection fraction of 55 - 60%. There is indeterminate diastolic function.   Right Ventricle: The right ventricle is normal in size. Wall thickness is normal. Systolic function is borderline low.   Tricuspid Valve: There is mild regurgitation.   Pulmonary Artery: No pulmonary hypertension.     X-Ray Chest AP Portable  Result Date: 4/25/2025  EXAMINATION: XR CHEST AP PORTABLE CLINICAL HISTORY: chest pain; TECHNIQUE: Single frontal view of the chest was performed. COMPARISON: No 04/24/2025 ne FINDINGS: Postoperative changes as before.  Mild cardiomegaly.  No significant airspace consolidation or pleural effusion identified.     See above Electronically signed by: Omar Rosenberg MD Date:    04/25/2025 Time:    08:04    X-Ray Chest AP Portable  Result Date: 4/24/2025  EXAMINATION: XR CHEST AP PORTABLE CLINICAL HISTORY: Acute chest pain, chest pain; COMPARISON: 01/19/2025 x-ray FINDINGS: Postop changes with sternal wires.  External pericardial wire noted. Cardiomegaly.  Chronic scarring within the left lung. No acute findings.     Stable postoperative chest x-ray. Electronically signed by: Gunner Rao MD Date:    04/24/2025 Time:    14:01      EKG    MICROBIOLOGY    MDM     Amount and/or Complexity of Data Reviewed  Clinical lab tests: reviewed  Tests in the radiology section of CPT®: reviewed  Tests in the medicine section of CPT®: reviewed  Discussion of test results with the performing providers: yes  Decide to obtain previous medical records or to obtain history from someone  other than the patient: yes  Obtain history from someone other than the patient: yes  Review and summarize past medical records: yes  Discuss the patient with other providers: yes  Independent visualization of images, tracings, or specimens: yes        Assessment/Plan:     Assessment & Plan  Syncope  Likely secondary to IV VD/dehydration versus medication side effect.  Orthostatics negative.    Plan:   -general IVFs   -repeat labs in a.m.  -cautious use with home medications close other    Acute kidney injury superimposed on stage 4 chronic kidney disease  GRACE is likely due to pre-renal azotemia due to intravascular volume depletion. Baseline creatinine is 2.9. Most recent creatinine and eGFR are listed below.  Recent Labs     05/12/25 1934   CREATININE 3.9*   EGFRNORACEVR 12*      Plan  - GRACE is being treated  - Avoid nephrotoxins and renally dose meds for GFR listed above  - Monitor urine output, serial BMP, and adjust therapy as needed    Elevated troponin  Likely secondary from post cardiac arrest compressions.  Currently on muscle relaxers in narcotics in outpatient setting.  Denies any new or worsening pain.  Plan:   -tele monitoring   -continue analgesics p.r.n.   -continue muscle relaxers p.r.n.  -trend troponin   -EKG as needed  -cardiology consult    Chest pain  Likely secondary from post cardiac arrest compressions.  Currently on muscle relaxers in narcotics in outpatient setting.  Denies any new or worsening pain.  Plan:   -tele monitoring   -continue analgesics p.r.n.   -continue muscle relaxers p.r.n.  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 Continue to monitor on telemetry.  Obtain cyclosporin level in a.m..  Continue home medication.    Essential hypertension  Chronic, controlled.  Latest  blood pressure and vitals reviewed-   Temp:  [97 °F (36.1 °C)-97.8 °F (36.6 °C)]   Pulse:  []   Resp:  [17-20]   BP: (101-162)/(52-85)   SpO2:  [94 %-98 %] .   Home meds for hypertension were reviewed and noted below.   Hypertension Medications              carvediloL (COREG) 3.125 MG tablet Take 1 tablet (3.125 mg total) by mouth 2 (two) times daily with meals.    furosemide (LASIX) 40 MG tablet Take 1 tablet (40 mg total) by mouth once daily.    NIFEdipine (PROCARDIA-XL) 60 MG (OSM) 24 hr tablet Take 1 tablet (60 mg total) by mouth once daily.    nitroGLYCERIN (NITROSTAT) 0.4 MG SL tablet PLACE 1 TABLET UNDER THE TONGUE EVERY 5 MINS AS NEEDED FOR CHEST PAIN FOR UP TO 3 DOSES.IF CONTINUED HEART PAIN/PRESSURE AFTER     While in the hospital, will manage blood pressure as follows; Continue home antihypertensive regimen    Will utilize p.r.n. blood pressure medication only if patient's blood pressure greater than  160/90 and she develops symptoms such as worsening chest pain or shortness of breath.    Major depressive disorder, single episode, moderate  Chronic. Stable. Not in acute exacerbation and currently denies endorsing any suicidal/homicidal ideations.   Plan:  -Continue home medications (zoloft)    Gastroesophageal reflux disease without esophagitis  Chronic. Stable. Currently asymptomatic. Home medications include PPI/Antacids as needed.  Plan:  -Continue PPI/Antacids as needed       Fibromyalgia  -continue Lyrica      VTE Risk Mitigation (From admission, onward)           Ordered     heparin (porcine) injection 5,000 Units  Every 8 hours         05/12/25 2146     IP VTE HIGH RISK PATIENT  Once         05/12/25 2146     Place sequential compression device  Until discontinued         05/12/25 2146                  //Core Measures   -DVT proph: SCDs, heparin  -Code status DNI    -Surrogate:none present    Components of this note were documented using a voice recognition system and are subject to errors  not corrected at the time the document was proof read. Please contact the author for any clarifications.       Chetan Galdamez NP  Department of Hospital Medicine  [facility] - [facility] ([facility])

## 2025-05-13 NOTE — ASSESSMENT & PLAN NOTE
GRACE is likely due to pre-renal azotemia due to intravascular volume depletion. Baseline creatinine is 2.9. Most recent creatinine and eGFR are listed below.  Recent Labs     05/12/25 1934   CREATININE 3.9*   EGFRNORACEVR 12*      Plan  - GRACE is being treated  - Avoid nephrotoxins and renally dose meds for GFR listed above  - Monitor urine output, serial BMP, and adjust therapy as needed

## 2025-05-13 NOTE — CHAPLAIN
Initial visit with patient.  Visited with patient to determine how I might be of best support to her.  Pt took time to share her story with me and she has been through a lot but her beth in God has helped her through.  Pt had no other needs but did ask for prayer.  I took time to do this for her and spiritual care remains available as needed.    Chaplain Paxton Yates M.Div., [facility]

## 2025-05-14 LAB
ABSOLUTE EOSINOPHIL (OHS): 0.11 K/UL
ABSOLUTE MONOCYTE (OHS): 0.46 K/UL (ref 0.3–1)
ABSOLUTE NEUTROPHIL COUNT (OHS): 2.8 K/UL (ref 1.8–7.7)
ANION GAP (OHS): 13 MMOL/L (ref 8–16)
BASOPHILS # BLD AUTO: 0.02 K/UL
BASOPHILS NFR BLD AUTO: 0.5 %
BUN SERPL-MCNC: 35 MG/DL (ref 8–23)
CALCIUM SERPL-MCNC: 8.5 MG/DL (ref 8.7–10.5)
CHLORIDE SERPL-SCNC: 108 MMOL/L (ref 95–110)
CO2 SERPL-SCNC: 21 MMOL/L (ref 23–29)
CREAT SERPL-MCNC: 3.1 MG/DL (ref 0.5–1.4)
CYCLOSPORINE BLD LC/MS/MS-MCNC: 38 NG/ML (ref 100–400)
ERYTHROCYTE [DISTWIDTH] IN BLOOD BY AUTOMATED COUNT: 16.1 % (ref 11.5–14.5)
GFR SERPLBLD CREATININE-BSD FMLA CKD-EPI: 16 ML/MIN/1.73/M2
GLUCOSE SERPL-MCNC: 92 MG/DL (ref 70–110)
HCT VFR BLD AUTO: 28.1 % (ref 37–48.5)
HGB BLD-MCNC: 9.1 GM/DL (ref 12–16)
IMM GRANULOCYTES # BLD AUTO: 0.03 K/UL (ref 0–0.04)
IMM GRANULOCYTES NFR BLD AUTO: 0.7 % (ref 0–0.5)
LYMPHOCYTES # BLD AUTO: 0.96 K/UL (ref 1–4.8)
MCH RBC QN AUTO: 28.3 PG (ref 27–31)
MCHC RBC AUTO-ENTMCNC: 32.4 G/DL (ref 32–36)
MCV RBC AUTO: 88 FL (ref 82–98)
NUCLEATED RBC (/100WBC) (OHS): 0 /100 WBC
PLATELET # BLD AUTO: 187 K/UL (ref 150–450)
PMV BLD AUTO: 9.2 FL (ref 9.2–12.9)
POTASSIUM SERPL-SCNC: 4.1 MMOL/L (ref 3.5–5.1)
RBC # BLD AUTO: 3.21 M/UL (ref 4–5.4)
RELATIVE EOSINOPHIL (OHS): 2.5 %
RELATIVE LYMPHOCYTE (OHS): 21.9 % (ref 18–48)
RELATIVE MONOCYTE (OHS): 10.5 % (ref 4–15)
RELATIVE NEUTROPHIL (OHS): 63.9 % (ref 38–73)
SODIUM SERPL-SCNC: 142 MMOL/L (ref 136–145)
WBC # BLD AUTO: 4.38 K/UL (ref 3.9–12.7)

## 2025-05-14 PROCEDURE — 63600175 PHARM REV CODE 636 W HCPCS: Mod: HCNC | Performed by: HOSPITALIST

## 2025-05-14 PROCEDURE — 99233 SBSQ HOSP IP/OBS HIGH 50: CPT | Mod: HCNC,,, | Performed by: INTERNAL MEDICINE

## 2025-05-14 PROCEDURE — 25000003 PHARM REV CODE 250: Mod: HCNC | Performed by: NURSE PRACTITIONER

## 2025-05-14 PROCEDURE — 63600175 PHARM REV CODE 636 W HCPCS: Mod: HCNC | Performed by: NURSE PRACTITIONER

## 2025-05-14 PROCEDURE — 85025 COMPLETE CBC W/AUTO DIFF WBC: CPT | Mod: HCNC | Performed by: STUDENT IN AN ORGANIZED HEALTH CARE EDUCATION/TRAINING PROGRAM

## 2025-05-14 PROCEDURE — 21400001 HC TELEMETRY ROOM: Mod: HCNC

## 2025-05-14 PROCEDURE — 36415 COLL VENOUS BLD VENIPUNCTURE: CPT | Mod: HCNC | Performed by: STUDENT IN AN ORGANIZED HEALTH CARE EDUCATION/TRAINING PROGRAM

## 2025-05-14 PROCEDURE — 80048 BASIC METABOLIC PNL TOTAL CA: CPT | Mod: HCNC | Performed by: STUDENT IN AN ORGANIZED HEALTH CARE EDUCATION/TRAINING PROGRAM

## 2025-05-14 RX ORDER — MORPHINE SULFATE 4 MG/ML
2 INJECTION, SOLUTION INTRAMUSCULAR; INTRAVENOUS ONCE
Status: COMPLETED | OUTPATIENT
Start: 2025-05-14 | End: 2025-05-14

## 2025-05-14 RX ADMIN — SODIUM CHLORIDE, POTASSIUM CHLORIDE, SODIUM LACTATE AND CALCIUM CHLORIDE: 600; 310; 30; 20 INJECTION, SOLUTION INTRAVENOUS at 01:05

## 2025-05-14 RX ADMIN — TIZANIDINE 4 MG: 4 TABLET ORAL at 11:05

## 2025-05-14 RX ADMIN — SERTRALINE HYDROCHLORIDE 25 MG: 25 TABLET ORAL at 08:05

## 2025-05-14 RX ADMIN — HEPARIN SODIUM 5000 UNITS: 5000 INJECTION INTRAVENOUS; SUBCUTANEOUS at 02:05

## 2025-05-14 RX ADMIN — TIZANIDINE 4 MG: 4 TABLET ORAL at 08:05

## 2025-05-14 RX ADMIN — MORPHINE SULFATE 2 MG: 4 INJECTION INTRAVENOUS at 05:05

## 2025-05-14 RX ADMIN — POLYETHYLENE GLYCOL 3350 17 G: 17 POWDER, FOR SOLUTION ORAL at 08:05

## 2025-05-14 RX ADMIN — CARVEDILOL 3.12 MG: 3.12 TABLET, FILM COATED ORAL at 08:05

## 2025-05-14 RX ADMIN — SODIUM BICARBONATE 650 MG: 650 TABLET ORAL at 11:05

## 2025-05-14 RX ADMIN — ZOLPIDEM TARTRATE 5 MG: 5 TABLET ORAL at 11:05

## 2025-05-14 RX ADMIN — PREGABALIN 50 MG: 25 CAPSULE ORAL at 11:05

## 2025-05-14 RX ADMIN — ACETAMINOPHEN 650 MG: 325 TABLET ORAL at 11:05

## 2025-05-14 RX ADMIN — HEPARIN SODIUM 5000 UNITS: 5000 INJECTION INTRAVENOUS; SUBCUTANEOUS at 11:05

## 2025-05-14 RX ADMIN — TIZANIDINE 4 MG: 4 TABLET ORAL at 02:05

## 2025-05-14 RX ADMIN — HEPARIN SODIUM 5000 UNITS: 5000 INJECTION INTRAVENOUS; SUBCUTANEOUS at 06:05

## 2025-05-14 RX ADMIN — SODIUM BICARBONATE 650 MG: 650 TABLET ORAL at 08:05

## 2025-05-14 RX ADMIN — CARVEDILOL 3.12 MG: 3.12 TABLET, FILM COATED ORAL at 05:05

## 2025-05-14 RX ADMIN — ASPIRIN 81 MG: 81 TABLET, COATED ORAL at 08:05

## 2025-05-14 RX ADMIN — PANTOPRAZOLE SODIUM 40 MG: 40 TABLET, DELAYED RELEASE ORAL at 08:05

## 2025-05-14 RX ADMIN — NIFEDIPINE 60 MG: 60 TABLET, FILM COATED, EXTENDED RELEASE ORAL at 08:05

## 2025-05-14 NOTE — PROGRESS NOTES
[facility] ([facility])  Cardiology  Progress Note    Patient Name: Nadia Damon  MRN: [id number]  Admission Date: 5/12/2025  Hospital Length of Stay: 2 days  Code Status: Partial Code   Attending Physician: Ra Rowe MD   Primary Care Physician: Elis Wick MD  Expected Discharge Date: 5/17/2025  Principal Problem:Syncope    Subjective:   HPI:  Ms. Damon is a 69 year old female patient whose current medical conditions include anemia, leukopenia, breast CA s/p excision and chemo/radiation, cervical spine DJD, and remote history of OHT in 5/1993 with PPM placed at same time who presented to [facility] ED yesterday from primary care clinic due to a near syncopal episode. Patient became lightheaded and dizzy yesterday after her appointment while waiting for her Lyft and eventually fell to the ground. The  at the clinic was able to abort the fall, and patient did not suffer any injury but she continued to feel lethargic and fatigued. Her BP was taken and she was found to be notably hypotensive (90/52 and ? 80/40) and thus EMS was called to transport her to the emergency room. Initial labs revealed troponin of 0.139, GRACE (creatinine of 3.9), BNP of 461, and mild anemia (9.5/29.2), and patient subsequently admitted for further evaluation and treatment. Cardiology consulted to assist with management. Patient seen and examined today, resting in bed. Feels ok. Still weak/tired. Reports she was just recently admitted and discharged from [facility] on 5/6/2025. Suffered a brief cardiac arrest during that admission (rhythm unknown, required 1 round of CPR and 1 round of epi). LHC was deferred at that time given patient's renal function and prior negative Cardiac PET in 11/2024. Patient endorses having ongoing issues with MSK chest wall/soreness since that time. Labs reviewed. Troponin elevated but flat, 0.139>0.095>0.106. EKG without acute ischemic changes. TTE 4/2025 with normal EF.  Creatinine improved to 3.5. Orthostatic vitals negative upon admission. H/H dipped to 7.6/23.4 this AM, consider transfusion.     Hospital Course:   5/14/2025-Patient seen and examined today, eating lunch. Feeling much better. Less fatigued. Still has some chest tenderness/soreness. Labs reviewed. Creatinine 3.1. H/H improved post transfusion. Orthostatics pending.        Review of Systems   HENT: Negative.     Eyes: Negative.    Cardiovascular:  Positive for chest pain (soreness).   Respiratory: Negative.     Endocrine: Negative.    Hematologic/Lymphatic: Negative.    Skin: Negative.    Musculoskeletal:  Positive for arthritis and joint pain.   Gastrointestinal: Negative.    Genitourinary: Negative.    Psychiatric/Behavioral: Negative.     Allergic/Immunologic: Negative.      Objective:     Vital Signs (Most Recent):  Temp: 98.2 °F (36.8 °C) (05/14/25 1246)  Pulse: 104 (05/14/25 1307)  Resp: 18 (05/14/25 1246)  BP: (!) 152/92 (05/14/25 1307)  SpO2: 97 % (05/14/25 1307) Vital Signs (24h Range):  Temp:  [98.2 °F (36.8 °C)-99.2 °F (37.3 °C)] 98.2 °F (36.8 °C)  Pulse:  [] 104  Resp:  [16-20] 18  SpO2:  [95 %-100 %] 97 %  BP: (120-171)/(72-97) 152/92     Weight: 62.1 kg (137 lb)  Body mass index is 25.06 kg/m².     SpO2: 97 %         Intake/Output Summary (Last 24 hours) at 5/14/2025 1441  Last data filed at 5/14/2025 0850  Gross per 24 hour   Intake 1970.93 ml   Output 2950 ml   Net -979.07 ml       Lines/Drains/Airways       Peripheral Intravenous Line  Duration                  Peripheral IV - Single Lumen 05/13/25 1545 22 G Anterior;Distal;Right Forearm <1 day                       Physical Exam  Vitals and nursing note reviewed.   Constitutional:       General: She is not in acute distress.     Appearance: Normal appearance. She is well-developed. She is not diaphoretic.   HENT:      Head: Normocephalic and atraumatic.   Eyes:      General:         Right eye: No discharge.         Left eye: No discharge.       "Pupils: Pupils are equal, round, and reactive to light.   Cardiovascular:      Rate and Rhythm: Normal rate and regular rhythm.      Heart sounds: Normal heart sounds, S1 normal and S2 normal. No murmur heard.     Comments: +TTP chest wall  Pulmonary:      Effort: Pulmonary effort is normal. No respiratory distress.   Musculoskeletal:      Right lower leg: No edema.      Left lower leg: No edema.   Skin:     General: Skin is warm and dry.      Findings: No erythema.   Neurological:      Mental Status: She is alert and oriented to person, place, and time.   Psychiatric:         Mood and Affect: Mood normal.         Behavior: Behavior normal.         Thought Content: Thought content normal.            Significant Labs: CMP   Recent Labs   Lab 05/12/25 1934 05/13/25 0442 05/14/25  0510    140 142   K 4.4 4.2 4.1    108 108   CO2 21* 22* 21*   GLU 97 104 92   BUN 45* 43* 35*   CREATININE 3.9* 3.5* 3.1*   CALCIUM 9.5 8.4* 8.5*   PROT 9.0*  --   --    ALBUMIN 3.5  --   --    BILITOT 0.4  --   --    ALKPHOS 195*  --   --    AST 36  --   --    ALT 28  --   --    ANIONGAP 14 10 13   , CBC   Recent Labs   Lab 05/12/25 1934 05/13/25 0441 05/14/25  0510   WBC 3.80* 3.90 4.38   HGB 9.5* 7.6* 9.1*   HCT 29.2* 23.4* 28.1*    207 187   , Troponin No results for input(s): "TROPONINIHS" in the last 48 hours., and All pertinent lab results from the last 24 hours have been reviewed.    Significant Imaging: Echocardiogram: Transthoracic echo (TTE) complete ([facility] Only):   Results for orders placed or performed during the hospital encounter of 04/24/25   Echo   Result Value Ref Range    LV Diastolic Volume 71 mL    Echo EF Estimated 40 %    LV Systolic Volume 43 mL    IVS 0.9 0.6 - 1.1 cm    LVIDd 4.0 3.5 - 6.0 cm    LVIDs 3.3 2.1 - 4.0 cm    LVOT diameter 2.0 cm    PW 0.9 0.6 - 1.1 cm    AV LVOT peak gradient 3 mmHg    LVOT mn grad 2 mmHg    LVOT peak ambika 0.9 m/s    LVOT peak VTI 20.6 cm    RV- marie basal diam 3.4 " cm    AV valve area 4.3 cm2    AV area by cont VTI 4.3 cm2    AV peak gradient 3 mmHg    AV mean gradient 1 mmHg    Ao peak jacky 0.8 m/s    Ao VTI 14.9 cm    MV Peak A Jacky 0.59 m/s    E wave deceleration time 138 ms    MV Peak E Jacky 0.72 m/s    E/A ratio 1.22     LV LATERAL E/E' RATIO 9.0     LV SEPTAL E/E' RATIO 12.0     TDI LATERAL 0.08 m/s    TDI SEPTAL 0.06 m/s    TV peak gradient 26 mmHg    TR Max Jacky 2.5 m/s    Ascending aorta 2.4 cm    STJ 3.1 cm    Sinus 3.11 cm    TAPSE 0.9 cm    BSA 1.74 m2    LVOT stroke volume 64.7 cm3    LV Systolic Volume Index 25.3 mL/m2    LV Diastolic Volume Index 41.76 mL/m2    LVOT area 3.1 cm2    FS 17.5 (A) 28 - 44 %    Left Ventricle Relative Wall Thickness 0.45 cm    LV mass 109.7 g    LV Mass Index 64.5 g/m2    E/E' ratio 10 m/s    RV/LV Ratio 0.85 cm    AV Velocity Ratio 1.13     AV index (prosthetic) 1.38     PRESLEY by Velocity Ratio 3.5 cm²    Mean e' 0.07 m/s    ZLVIDS 0.95     ZLVIDD -1.67     EF 45 %    TV resting pulmonary artery pressure 28 mmHg    RV TB RVSP 6 mmHg    Est. RA pres 3 mmHg    Narrative      S/P heart transplant 1993.    Left Ventricle: The left ventricle is normal in size. Normal wall   thickness. There is concentric remodeling. There is mildly reduced   systolic function. Ejection fraction is approximately 45%. Mild global   hypokinesis present. Contractility is better preserved in the basal   segements. There is normal diastolic function.    Right Ventricle: The right ventricle is normal in size. Systolic   function is moderately reduced. TAPSE is 0.9 cm.    Tricuspid Valve: There is mild to moderate regurgitation.    Pulmonary Artery: The estimated pulmonary artery systolic pressure is   28 mmHg.    IVC/SVC: Normal venous pressure at 3 mmHg.      and EKG: Reviewed  Assessment and Plan:   Patient who presents s/p syncopal episode likely mediated by volume depletion/anemia. CV wise remains stable. H/H improved post transfusion.    * Syncope  -Presents s/p  near syncopal episode, denies LOC  -Trop elevated but flat  -Orthostatic vitals negative  -Noted to have GRACE and hypotension upon admission-->likely cause of episode  -H/H also dipped to 7.6/23.4 this AM, needs further and transfusion w/u per primary team  -Recent TTE 4/2025 with normal EF; prior Cardiac PET 11/2024 negative for ischemia/scar    5/124/2025  -Stable CV wise  -Orthostatics pending    Normocytic anemia  -H/H dipped to 7.6/23.4, consider transfusion  -Mgmt per primary team      5/14/2025  -Improved post transfusion    Elevated troponin  -Troponin 0.139>0.095>0.106  -Elevation likely secondary to demand ischemia from GRACE, hypotension, anemia  -Recent TTE 4/2025 with normal EF  -Prior Cardiac PET 11/2024 negative for ischemia/scar  -CP on exam is atypical and reproducible  -Continue OMT as tolerated    Acute kidney injury superimposed on stage 4 chronic kidney disease  -Improved with IVF  -Mgmt per primary team    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

## 2025-05-14 NOTE — PLAN OF CARE
Problem: Adult Inpatient Plan of Care  Goal: Plan of Care Review  Outcome: Progressing  Goal: Patient-Specific Goal (Individualized)  Outcome: Progressing  Goal: Absence of Hospital-Acquired Illness or Injury  Outcome: Progressing  Goal: Optimal Comfort and Wellbeing  Outcome: Progressing  Goal: Readiness for Transition of Care  Outcome: Progressing     Problem: Acute Kidney Injury/Impairment  Goal: Fluid and Electrolyte Balance  Outcome: Progressing  Goal: Improved Oral Intake  Outcome: Progressing  Goal: Effective Renal Function  Outcome: Progressing     Problem: Skin Injury Risk Increased  Goal: Skin Health and Integrity  Outcome: Progressing     Problem: Fall Injury Risk  Goal: Absence of Fall and Fall-Related Injury  Outcome: Progressing   POC reviewed with pt. Pt verbalizes understanding of POC. No questions at this time.  AAOx4 NADN.  NSR on cardiac monitor.  Pt remains free of falls.  No complaints at this time.  Safety measures in place. Will continue to monitor.  Informed pt to call for assistance before getting up. Pt verbalizes understanding.  Hourly rounding and chart check complete.

## 2025-05-14 NOTE — ASSESSMENT & PLAN NOTE
GRACE is likely due to pre-renal azotemia due to intravascular volume depletion. Baseline creatinine is 2.9. Most recent creatinine and eGFR are listed below.  Recent Labs     05/12/25  1934 05/13/25  0442 05/14/25  0510   CREATININE 3.9* 3.5* 3.1*   EGFRNORACEVR 12* 14* 16*      Plan  - GRACE is being treated  - Avoid nephrotoxins and renally dose meds for GFR listed above  - Monitor urine output, serial BMP, and adjust therapy as needed

## 2025-05-14 NOTE — PROGRESS NOTES
[facility] Medicine  Progress Note    Patient Name: Nadia Damon  MRN: [id number]  Patient Class: IP- Inpatient   Admission Date: 5/12/2025  Length of Stay: 2 days  Attending Physician: Ra Rowe MD  Primary Care Provider: Elis Wick MD        Subjective     Principal Problem:Syncope        HPI:  Nadia Damon is a 69 y.o. female with a PMH  has a past medical history of Abdominal wall hernia, Abnormal mammogram (10/12/2021), Acute cystitis without hematuria (05/10/2022), Acute ischemic right middle cerebral artery (MCA) stroke (07/20/2024), Adverse drug reaction (11/12/2024), GRACE (acute kidney injury) (11/22/2021), Anxiety, Aphasia (07/19/2024), Arthritis, Breast cancer in female (08/2021), Breast mass, right (11/13/2024), Bronchitis (08/18/2016), C. difficile colitis (11/29/2021), Cellulitis of axilla, left (12/23/2021), Chronic diastolic heart failure (12/16/2021), Chronic kidney disease, Chronic midline low back pain without sciatica (06/18/2018), CKD (chronic kidney disease) stage 4, GFR 15-29 ml/min (04/20/2016), Closed nondisplaced fracture of distal phalanx of left great toe with routine healing (10/22/2018), Coronary artery disease (1993), COVID-19 in immunocompromised patient (02/26/2024), Cystitis (05/10/2022), Depression, Encounter for blood transfusion, Encounter for palliative care (10/14/2024), Fibromyalgia, Heart failure, Heart transplanted (1993), History of hyperparathyroidism; Hyperparathyroidism, secondary renal, Hypertension, Immune disorder, 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), Impaired mobility (07/28/2022), Iron deficiency anemia (08/15/2017), Kidney stones, Obesity, Obesity (BMI 30.0-34.9) (07/22/2019), Other osteoporosis without current pathological fracture (08/30/2019), Other pancytopenia (05/06/2024), Parotitis, acute (09/19/2023), Pharyngitis (01/09/2019), Pneumonia due to infectious organism (03/14/2024), PONV  (postoperative nausea and vomiting), Severe sepsis (11/22/2021), Shingles (2003 approx), Stage 4 chronic kidney disease (11/22/2021), Subclinical hypothyroidism (06/16/2023), Thrombocytopenia, unspecified (11/29/2021), Trouble in sleeping, Unresponsiveness (11/13/2024), and Urinary incontinence.presented to the Emergency Department for evaluation of syncopal event and low bp while at f/u appointment at PCP (Dr. Wick) office. Pt c/o chest pain and soreness which began after undergoing chest compressions on 4/24 due to cardiac arrest. Pt states that she is unsure how long chest compressions lasted. She states that she was sent from [facility] to have back surgery but the procedure was postponed due to concerns of chest pain. After 3 EKGs, the surgeon cancelled the procedure on 4/24.  Pt reports while awaiting for a Lyft, she felt dizzy and light-headed. Pt states that the  of the clinic ran over to catch her and prevented her from hitting her head. Dr. Wick re-evaluated the pt and advised pt to come into the ER. PT confirms that she had a heart transplant years ago. She states that her transplant doctor has been denying her of any further surgeries due to Stage 5 Kidney Failure. She confirms that she is aware of dialysis being her next step. Symptoms are constant and moderate in severity. No mitigating or exacerbating factors reported. No associated sxs included. Patient denies any fever, chills cough, rhinorrhea, blood in stool, melena, or diarrhea. No prior Tx specified. No further complaints or concerns at this time. Dr. Tubbs with Cardiac Transplant team is ok with keeping patient in BR and can obtain cyclosporine level in am with gentle iv hydration.    ER workup revealed CBC to be at baseline with H/H of 9.5/29.2.  CMP revealed BUN/creatinine 45/3.9 with EGFR of 12 (previously 40/2.9 with EGFR 17 on 05/07/2025).  BNP 4 6.  Initial troponin 0.139 with repeat troponin 0.095.  UA with +3 leukocyte  "esterase, 17 WBCs, rare bacteria.  EKG revealed normal sinus rhythm with right bundle-branch block with a ventricular rate of 75 beats per minute and a QT/QTC of 452/504.  Orthostatics negative.  Vital signs stable.  Hospital Medicine consulted to admit patient for gentle IV hydration for GRACE superimposed on CKD and syncope.  Patient in agreement with treatment plan.  Patient admitted under inpatient status.      PCP: Elis Wick      Overview/Hospital Course:  05/13/2025  H&H steadily downtrending, could be dilutional. Iron studies ordered for completion. Orthostatics negative. TSH >12, but has been similarly high in the past. Free T3 and T4 pending. Cardiology evaluation pending. Just her hgb being <8 with her significant heart history is enough to transfuse.  Discussed with Cardiology.  1 unit ordered to be transfused slowly.     05/14/2025  Doing much better clinically. Monitor overnight. Anticipate discharge in the AM if cleared by cardiology    Interval history:  No acute events overnight. Doing well this AM with no complaints at our encounter. Denies CP, SOB, Abdominal pain, fevers/chills.      Objective:   BP (!) 152/92 (BP Location: Right arm, Patient Position: Standing)   Pulse 102   Temp 98.2 °F (36.8 °C) (Oral)   Resp 18   Ht 5' 2" (1.575 m)   Wt 62.1 kg (137 lb)   LMP 06/20/1983 (Within Years)   SpO2 97%   BMI 25.06 kg/m²     Intake/Output Summary (Last 24 hours) at 5/14/2025 1657  Last data filed at 5/14/2025 0850  Gross per 24 hour   Intake 1970.93 ml   Output 2950 ml   Net -979.07 ml       PHYSICAL EXAM  Vitals reviewed  Constitutional:       General: She is awake. She is not in acute distress.     Appearance: Normal appearance. She is well-developed and well-groomed. She is not ill-appearing, toxic-appearing or diaphoretic.   Cardiovascular:      Rate and Rhythm: Normal rate and regular rhythm.      Heart sounds: Normal heart sounds. No murmur heard.  Pulmonary:      Effort: Pulmonary " effort is normal.      Breath sounds: Normal breath sounds. No decreased breath sounds, wheezing, rhonchi or rales.   Abdominal:      General: Bowel sounds are normal.      Palpations: Abdomen is soft.      Tenderness: There is no abdominal tenderness. There is no right CVA tenderness, left CVA tenderness, guarding or rebound.   Musculoskeletal:      Cervical back: Normal range of motion and neck supple.      Right lower leg: No edema.      Left lower leg: No edema.      Comments: 5/5 strength throughout   Skin:     General: Skin is warm and dry.      Capillary Refill: Capillary refill takes less than 2 seconds.   Neurological:      General: No focal deficit present.      Mental Status: She is alert and oriented to person, place, and time. Mental status is at baseline.      GCS: GCS eye subscore is 4. GCS verbal subscore is 5. GCS motor subscore is 6.      Cranial Nerves: Cranial nerves 2-12 are intact.      Sensory: Sensation is intact.      Motor: Motor function is intact.   Psychiatric:         Mood and Affect: Mood normal.         Speech: Speech normal.         Behavior: Behavior normal. Behavior is cooperative.     LABS  All labs from the past 24 hours were reviewed.     BMP:   Recent Labs   Lab 05/12/25 1934 05/13/25 0442 05/14/25  0510   GLU 97   < > 92      < > 142   K 4.4   < > 4.1      < > 108   CO2 21*   < > 21*   BUN 45*   < > 35*   CREATININE 3.9*   < > 3.1*   CALCIUM 9.5   < > 8.5*   MG 1.9  --   --     < > = values in this interval not displayed.     CBC:   Recent Labs   Lab 05/12/25 1934 05/13/25 0441 05/14/25  0510   WBC 3.80* 3.90 4.38   HGB 9.5* 7.6* 9.1*   HCT 29.2* 23.4* 28.1*    207 187     CMP:   Recent Labs   Lab 05/12/25 1934 05/13/25 0442 05/14/25  0510    140 142   K 4.4 4.2 4.1    108 108   CO2 21* 22* 21*   GLU 97 104 92   BUN 45* 43* 35*   CREATININE 3.9* 3.5* 3.1*   CALCIUM 9.5 8.4* 8.5*   PROT 9.0*  --   --    ALBUMIN 3.5  --   --    BILITOT 0.4   "--   --    ALKPHOS 195*  --   --    AST 36  --   --    ALT 28  --   --    ANIONGAP 14 10 13     Cardiac Markers:   Recent Labs   Lab 05/12/25 1934   *     Coagulation: No results for input(s): "PT", "INR", "APTT" in the last 48 hours.  Lactic Acid: No results for input(s): "LACTATE" in the last 48 hours.  Magnesium:   Recent Labs   Lab 05/12/25 1934   MG 1.9     Troponin:   Recent Labs   Lab 05/12/25 1934 05/12/25  2236 05/13/25  0200   TROPONINI 0.139* 0.095* 0.106*     TSH:   Recent Labs   Lab 04/25/25  0720   TSH 12.101*     Urine Studies:   Recent Labs   Lab 05/12/25 2000   COLORU Yellow   APPEARANCEUA Hazy*   PHUR 6.0   SPECGRAV 1.010   PROTEINUA 1+*   GLUCUA Negative   BILIRUBINUA Negative   OCCULTUA Negative   NITRITE Negative   UROBILINOGEN Negative   LEUKOCYTESUR 3+*   RBCUA <1   WBCUA 17*   BACTERIA Rare   HYALINECASTS 0       IMAGING  All imaging from the past 24 hours were reviewed.     Imaging Results    None             Assessment & Plan  Syncope  Likely secondary to IV VD/dehydration versus medication side effect.  Orthostatics negative.    Plan:   -general IVFs   -repeat labs in a.m.  -cautious use with home medications close other    Acute kidney injury superimposed on stage 4 chronic kidney disease  GRACE is likely due to pre-renal azotemia due to intravascular volume depletion. Baseline creatinine is 2.9. Most recent creatinine and eGFR are listed below.  Recent Labs     05/12/25 1934 05/13/25  0442 05/14/25  0510   CREATININE 3.9* 3.5* 3.1*   EGFRNORACEVR 12* 14* 16*      Plan  - GRACE is being treated  - Avoid nephrotoxins and renally dose meds for GFR listed above  - Monitor urine output, serial BMP, and adjust therapy as needed    Elevated troponin  Likely secondary from post cardiac arrest compressions.  Currently on muscle relaxers in narcotics in outpatient setting.  Denies any new or worsening pain.  Plan:   -tele monitoring   -continue analgesics p.r.n.   -continue muscle relaxers " p.r.n.  -trend troponin   -EKG as needed  -cardiology consult  Chest pain  Likely secondary from post cardiac arrest compressions.  Currently on muscle relaxers in narcotics in outpatient setting.  Denies any new or worsening pain.  Plan:   -tele monitoring   -continue analgesics p.r.n.   -continue muscle relaxers p.r.n.  -trend troponin   -EKG as needed  -cardiology consult  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  Patient with known CAD s/p heart transplant which is controlled Will continue ASA and monitor for S/Sx of angina/ACS. Continue to monitor on telemetry.  Obtain cyclosporin level in a.m..  Continue home medication.  Essential hypertension  Chronic, controlled.  Latest blood pressure and vitals reviewed-   Temp:  [98.2 °F (36.8 °C)-99.2 °F (37.3 °C)]   Pulse:  []   Resp:  [16-20]   BP: (120-170)/(72-92)   SpO2:  [95 %-100 %] .   Home meds for hypertension were reviewed and noted below.   Hypertension Medications              carvediloL (COREG) 3.125 MG tablet Take 1 tablet (3.125 mg total) by mouth 2 (two) times daily with meals.    furosemide (LASIX) 40 MG tablet Take 1 tablet (40 mg total) by mouth once daily.    NIFEdipine (PROCARDIA-XL) 60 MG (OSM) 24 hr tablet Take 1 tablet (60 mg total) by mouth once daily.    nitroGLYCERIN (NITROSTAT) 0.4 MG SL tablet PLACE 1 TABLET UNDER THE TONGUE EVERY 5 MINS AS NEEDED FOR CHEST PAIN FOR UP TO 3 DOSES.IF CONTINUED HEART PAIN/PRESSURE AFTER            While in the hospital, will manage blood pressure as follows; Continue home antihypertensive regimen    Will utilize p.r.n. blood pressure medication only if patient's blood pressure greater than  160/90 and she develops symptoms such as worsening chest pain or shortness of breath.    Major depressive disorder, single episode, moderate  Chronic. Stable. Not in acute exacerbation and currently denies endorsing any suicidal/homicidal ideations.   Plan:  -Continue home medications  (zoloft)    Gastroesophageal reflux disease without esophagitis  Chronic. Stable. Currently asymptomatic. Home medications include PPI/Antacids as needed.  Plan:  -Continue PPI/Antacids as needed       Fibromyalgia  -continue Lyrica    Normocytic anemia    Recent Labs     05/12/25  1934 05/13/25  0441 05/14/25  0510   HGB 9.5* 7.6* 9.1*   HCT 29.2* 23.4* 28.1*     No obvious signs of bleeding on exam. Downtrend from previous lab draws, could be contributing to current symptoms  --serial CBC to trend h&h  --iron studies, B12, folic acid, FOBT ordered for completion  --low threshold to transfuse for hgb < 7      VTE Risk Mitigation (From admission, onward)           Ordered     heparin (porcine) injection 5,000 Units  Every 8 hours         05/12/25 2146     IP VTE HIGH RISK PATIENT  Once         05/12/25 2146     Place sequential compression device  Until discontinued         05/12/25 2146                    Discharge Planning   RATNA: 5/17/2025     Code Status: Partial Code   Medical Readiness for Discharge Date:   Discharge Plan A: Assisted Living                        Ra Rowe MD  Department of Hospital Medicine   '[facility] - Telemetry ([facility])

## 2025-05-14 NOTE — ASSESSMENT & PLAN NOTE
-H/H dipped to 7.6/23.4, consider transfusion  -Mgmt per primary team      5/14/2025  -Improved post transfusion

## 2025-05-14 NOTE — SUBJECTIVE & OBJECTIVE
Review of Systems   HENT: Negative.     Eyes: Negative.    Cardiovascular:  Positive for chest pain (soreness).   Respiratory: Negative.     Endocrine: Negative.    Hematologic/Lymphatic: Negative.    Skin: Negative.    Musculoskeletal:  Positive for arthritis and joint pain.   Gastrointestinal: Negative.    Genitourinary: Negative.    Psychiatric/Behavioral: Negative.     Allergic/Immunologic: Negative.      Objective:     Vital Signs (Most Recent):  Temp: 98.2 °F (36.8 °C) (05/14/25 1246)  Pulse: 104 (05/14/25 1307)  Resp: 18 (05/14/25 1246)  BP: (!) 152/92 (05/14/25 1307)  SpO2: 97 % (05/14/25 1307) Vital Signs (24h Range):  Temp:  [98.2 °F (36.8 °C)-99.2 °F (37.3 °C)] 98.2 °F (36.8 °C)  Pulse:  [] 104  Resp:  [16-20] 18  SpO2:  [95 %-100 %] 97 %  BP: (120-171)/(72-97) 152/92     Weight: 62.1 kg (137 lb)  Body mass index is 25.06 kg/m².     SpO2: 97 %         Intake/Output Summary (Last 24 hours) at 5/14/2025 1441  Last data filed at 5/14/2025 0850  Gross per 24 hour   Intake 1970.93 ml   Output 2950 ml   Net -979.07 ml       Lines/Drains/Airways       Peripheral Intravenous Line  Duration                  Peripheral IV - Single Lumen 05/13/25 1545 22 G Anterior;Distal;Right Forearm <1 day                       Physical Exam  Vitals and nursing note reviewed.   Constitutional:       General: She is not in acute distress.     Appearance: Normal appearance. She is well-developed. She is not diaphoretic.   HENT:      Head: Normocephalic and atraumatic.   Eyes:      General:         Right eye: No discharge.         Left eye: No discharge.      Pupils: Pupils are equal, round, and reactive to light.   Cardiovascular:      Rate and Rhythm: Normal rate and regular rhythm.      Heart sounds: Normal heart sounds, S1 normal and S2 normal. No murmur heard.     Comments: +TTP chest wall  Pulmonary:      Effort: Pulmonary effort is normal. No respiratory distress.   Musculoskeletal:      Right lower leg: No edema.  "     Left lower leg: No edema.   Skin:     General: Skin is warm and dry.      Findings: No erythema.   Neurological:      Mental Status: She is alert and oriented to person, place, and time.   Psychiatric:         Mood and Affect: Mood normal.         Behavior: Behavior normal.         Thought Content: Thought content normal.            Significant Labs: CMP   Recent Labs   Lab 05/12/25 1934 05/13/25 0442 05/14/25  0510    140 142   K 4.4 4.2 4.1    108 108   CO2 21* 22* 21*   GLU 97 104 92   BUN 45* 43* 35*   CREATININE 3.9* 3.5* 3.1*   CALCIUM 9.5 8.4* 8.5*   PROT 9.0*  --   --    ALBUMIN 3.5  --   --    BILITOT 0.4  --   --    ALKPHOS 195*  --   --    AST 36  --   --    ALT 28  --   --    ANIONGAP 14 10 13   , CBC   Recent Labs   Lab 05/12/25 1934 05/13/25 0441 05/14/25  0510   WBC 3.80* 3.90 4.38   HGB 9.5* 7.6* 9.1*   HCT 29.2* 23.4* 28.1*    207 187   , Troponin No results for input(s): "TROPONINIHS" in the last 48 hours., and All pertinent lab results from the last 24 hours have been reviewed.    Significant Imaging: Echocardiogram: Transthoracic echo (TTE) complete (Cupid Only):   Results for orders placed or performed during the hospital encounter of 04/24/25   Echo   Result Value Ref Range    LV Diastolic Volume 71 mL    Echo EF Estimated 40 %    LV Systolic Volume 43 mL    IVS 0.9 0.6 - 1.1 cm    LVIDd 4.0 3.5 - 6.0 cm    LVIDs 3.3 2.1 - 4.0 cm    LVOT diameter 2.0 cm    PW 0.9 0.6 - 1.1 cm    AV LVOT peak gradient 3 mmHg    LVOT mn grad 2 mmHg    LVOT peak jacky 0.9 m/s    LVOT peak VTI 20.6 cm    RV- marie basal diam 3.4 cm    AV valve area 4.3 cm2    AV area by cont VTI 4.3 cm2    AV peak gradient 3 mmHg    AV mean gradient 1 mmHg    Ao peak jacky 0.8 m/s    Ao VTI 14.9 cm    MV Peak A Jacky 0.59 m/s    E wave deceleration time 138 ms    MV Peak E Jacyk 0.72 m/s    E/A ratio 1.22     LV LATERAL E/E' RATIO 9.0     LV SEPTAL E/E' RATIO 12.0     TDI LATERAL 0.08 m/s    TDI SEPTAL 0.06 m/s "    TV peak gradient 26 mmHg    TR Max Jacky 2.5 m/s    Ascending aorta 2.4 cm    STJ 3.1 cm    Sinus 3.11 cm    TAPSE 0.9 cm    BSA 1.74 m2    LVOT stroke volume 64.7 cm3    LV Systolic Volume Index 25.3 mL/m2    LV Diastolic Volume Index 41.76 mL/m2    LVOT area 3.1 cm2    FS 17.5 (A) 28 - 44 %    Left Ventricle Relative Wall Thickness 0.45 cm    LV mass 109.7 g    LV Mass Index 64.5 g/m2    E/E' ratio 10 m/s    RV/LV Ratio 0.85 cm    AV Velocity Ratio 1.13     AV index (prosthetic) 1.38     PRESLEY by Velocity Ratio 3.5 cm²    Mean e' 0.07 m/s    ZLVIDS 0.95     ZLVIDD -1.67     EF 45 %    TV resting pulmonary artery pressure 28 mmHg    RV TB RVSP 6 mmHg    Est. RA pres 3 mmHg    Narrative      S/P heart transplant 1993.    Left Ventricle: The left ventricle is normal in size. Normal wall   thickness. There is concentric remodeling. There is mildly reduced   systolic function. Ejection fraction is approximately 45%. Mild global   hypokinesis present. Contractility is better preserved in the basal   segements. There is normal diastolic function.    Right Ventricle: The right ventricle is normal in size. Systolic   function is moderately reduced. TAPSE is 0.9 cm.    Tricuspid Valve: There is mild to moderate regurgitation.    Pulmonary Artery: The estimated pulmonary artery systolic pressure is   28 mmHg.    IVC/SVC: Normal venous pressure at 3 mmHg.      and EKG: Reviewed

## 2025-05-14 NOTE — ASSESSMENT & PLAN NOTE
Chronic, controlled.  Latest blood pressure and vitals reviewed-   Temp:  [98.2 °F (36.8 °C)-99.2 °F (37.3 °C)]   Pulse:  []   Resp:  [16-20]   BP: (120-170)/(72-92)   SpO2:  [95 %-100 %] .   Home meds for hypertension were reviewed and noted below.   Hypertension Medications              carvediloL (COREG) 3.125 MG tablet Take 1 tablet (3.125 mg total) by mouth 2 (two) times daily with meals.    furosemide (LASIX) 40 MG tablet Take 1 tablet (40 mg total) by mouth once daily.    NIFEdipine (PROCARDIA-XL) 60 MG (OSM) 24 hr tablet Take 1 tablet (60 mg total) by mouth once daily.    nitroGLYCERIN (NITROSTAT) 0.4 MG SL tablet PLACE 1 TABLET UNDER THE TONGUE EVERY 5 MINS AS NEEDED FOR CHEST PAIN FOR UP TO 3 DOSES.IF CONTINUED HEART PAIN/PRESSURE AFTER            While in the hospital, will manage blood pressure as follows; Continue home antihypertensive regimen    Will utilize p.r.n. blood pressure medication only if patient's blood pressure greater than  160/90 and she develops symptoms such as worsening chest pain or shortness of breath.

## 2025-05-14 NOTE — ASSESSMENT & PLAN NOTE
-Presents s/p near syncopal episode, denies LOC  -Trop elevated but flat  -Orthostatic vitals negative  -Noted to have GRACE and hypotension upon admission-->likely cause of episode  -H/H also dipped to 7.6/23.4 this AM, needs further and transfusion w/u per primary team  -Recent TTE 4/2025 with normal EF; prior Cardiac PET 11/2024 negative for ischemia/scar    5/124/2025  -Stable CV wise  -Orthostatics pending

## 2025-05-14 NOTE — ASSESSMENT & PLAN NOTE
Recent Labs     05/12/25  1934 05/13/25  0441 05/14/25  0510   HGB 9.5* 7.6* 9.1*   HCT 29.2* 23.4* 28.1*     No obvious signs of bleeding on exam. Downtrend from previous lab draws, could be contributing to current symptoms  --serial CBC to trend h&h  --iron studies, B12, folic acid, FOBT ordered for completion  --low threshold to transfuse for hgb < 7

## 2025-05-14 NOTE — HOSPITAL COURSE
"5/14/2025-Patient seen and examined today, eating lunch. Feeling much better. Less fatigued. Still has some chest tenderness/soreness. Labs reviewed. Creatinine 3.1. H/H improved post transfusion. Orthostatics pending.    5/15/2025-Patient seen and examined today, sitting up in bedside chair. Reported chest "constriction"/soreness after straining to have a BM, improved with muscle relaxant. Labs reviewed. Creatinine 3.6. H/H 9.0/27.7. Apparently after we saw patient, she became hypotension and experienced CP 10/10 CP after working with PT/OT. Sublingual nitro given with improvement, being transferred to [facility].    5/16/2025-Patient seen and examined today, sitting up in bedside chair. Feels ok. Reports some recurrent CP this AM, given sublingual nitro with relief. Awaiting transfer to [facility]. Labs reviewed, trop trending /down. H/H 8.7/27.0. Creatinine 3.4.   "

## 2025-05-15 LAB
ABSOLUTE EOSINOPHIL (OHS): 0.13 K/UL
ABSOLUTE MONOCYTE (OHS): 0.57 K/UL (ref 0.3–1)
ABSOLUTE NEUTROPHIL COUNT (OHS): 2.32 K/UL (ref 1.8–7.7)
ANION GAP (OHS): 11 MMOL/L (ref 8–16)
BASOPHILS # BLD AUTO: 0.01 K/UL
BASOPHILS NFR BLD AUTO: 0.3 %
BUN SERPL-MCNC: 48 MG/DL (ref 8–23)
CALCIUM SERPL-MCNC: 8.4 MG/DL (ref 8.7–10.5)
CHLORIDE SERPL-SCNC: 106 MMOL/L (ref 95–110)
CO2 SERPL-SCNC: 23 MMOL/L (ref 23–29)
CREAT SERPL-MCNC: 3.6 MG/DL (ref 0.5–1.4)
ERYTHROCYTE [DISTWIDTH] IN BLOOD BY AUTOMATED COUNT: 16.1 % (ref 11.5–14.5)
GFR SERPLBLD CREATININE-BSD FMLA CKD-EPI: 13 ML/MIN/1.73/M2
GLUCOSE SERPL-MCNC: 88 MG/DL (ref 70–110)
HCT VFR BLD AUTO: 27.7 % (ref 37–48.5)
HGB BLD-MCNC: 9 GM/DL (ref 12–16)
IMM GRANULOCYTES # BLD AUTO: 0.02 K/UL (ref 0–0.04)
IMM GRANULOCYTES NFR BLD AUTO: 0.5 % (ref 0–0.5)
LYMPHOCYTES # BLD AUTO: 0.92 K/UL (ref 1–4.8)
MCH RBC QN AUTO: 28.4 PG (ref 27–31)
MCHC RBC AUTO-ENTMCNC: 32.5 G/DL (ref 32–36)
MCV RBC AUTO: 87 FL (ref 82–98)
NUCLEATED RBC (/100WBC) (OHS): 0 /100 WBC
OB PNL STL: NEGATIVE
PLATELET # BLD AUTO: 206 K/UL (ref 150–450)
PMV BLD AUTO: 9.6 FL (ref 9.2–12.9)
POTASSIUM SERPL-SCNC: 4.7 MMOL/L (ref 3.5–5.1)
RBC # BLD AUTO: 3.17 M/UL (ref 4–5.4)
RELATIVE EOSINOPHIL (OHS): 3.3 %
RELATIVE LYMPHOCYTE (OHS): 23.2 % (ref 18–48)
RELATIVE MONOCYTE (OHS): 14.4 % (ref 4–15)
RELATIVE NEUTROPHIL (OHS): 58.3 % (ref 38–73)
SODIUM SERPL-SCNC: 140 MMOL/L (ref 136–145)
TROPONIN I SERPL DL<=0.01 NG/ML-MCNC: 0.04 NG/ML
WBC # BLD AUTO: 3.97 K/UL (ref 3.9–12.7)

## 2025-05-15 PROCEDURE — 97116 GAIT TRAINING THERAPY: CPT | Mod: HCNC

## 2025-05-15 PROCEDURE — 82272 OCCULT BLD FECES 1-3 TESTS: CPT | Mod: HCNC | Performed by: STUDENT IN AN ORGANIZED HEALTH CARE EDUCATION/TRAINING PROGRAM

## 2025-05-15 PROCEDURE — 84484 ASSAY OF TROPONIN QUANT: CPT | Mod: HCNC | Performed by: STUDENT IN AN ORGANIZED HEALTH CARE EDUCATION/TRAINING PROGRAM

## 2025-05-15 PROCEDURE — 97166 OT EVAL MOD COMPLEX 45 MIN: CPT | Mod: HCNC

## 2025-05-15 PROCEDURE — 25000242 PHARM REV CODE 250 ALT 637 W/ HCPCS: Mod: HCNC | Performed by: STUDENT IN AN ORGANIZED HEALTH CARE EDUCATION/TRAINING PROGRAM

## 2025-05-15 PROCEDURE — 97161 PT EVAL LOW COMPLEX 20 MIN: CPT | Mod: HCNC

## 2025-05-15 PROCEDURE — 85025 COMPLETE CBC W/AUTO DIFF WBC: CPT | Mod: HCNC | Performed by: STUDENT IN AN ORGANIZED HEALTH CARE EDUCATION/TRAINING PROGRAM

## 2025-05-15 PROCEDURE — 97162 PT EVAL MOD COMPLEX 30 MIN: CPT | Mod: HCNC

## 2025-05-15 PROCEDURE — 99233 SBSQ HOSP IP/OBS HIGH 50: CPT | Mod: HCNC,,, | Performed by: INTERNAL MEDICINE

## 2025-05-15 PROCEDURE — 82310 ASSAY OF CALCIUM: CPT | Mod: HCNC | Performed by: STUDENT IN AN ORGANIZED HEALTH CARE EDUCATION/TRAINING PROGRAM

## 2025-05-15 PROCEDURE — 21400001 HC TELEMETRY ROOM: Mod: HCNC

## 2025-05-15 PROCEDURE — 63600175 PHARM REV CODE 636 W HCPCS: Mod: HCNC | Performed by: STUDENT IN AN ORGANIZED HEALTH CARE EDUCATION/TRAINING PROGRAM

## 2025-05-15 PROCEDURE — 97530 THERAPEUTIC ACTIVITIES: CPT | Mod: HCNC

## 2025-05-15 PROCEDURE — 63600175 PHARM REV CODE 636 W HCPCS: Mod: HCNC | Performed by: NURSE PRACTITIONER

## 2025-05-15 PROCEDURE — 36415 COLL VENOUS BLD VENIPUNCTURE: CPT | Mod: HCNC | Performed by: STUDENT IN AN ORGANIZED HEALTH CARE EDUCATION/TRAINING PROGRAM

## 2025-05-15 PROCEDURE — 25000003 PHARM REV CODE 250: Mod: HCNC | Performed by: NURSE PRACTITIONER

## 2025-05-15 RX ORDER — CYCLOSPORINE 25 MG/1
50 CAPSULE, LIQUID FILLED ORAL 2 TIMES DAILY
Status: DISCONTINUED | OUTPATIENT
Start: 2025-05-15 | End: 2025-05-17 | Stop reason: HOSPADM

## 2025-05-15 RX ORDER — NITROGLYCERIN 0.4 MG/1
0.4 TABLET SUBLINGUAL EVERY 5 MIN PRN
Status: DISCONTINUED | OUTPATIENT
Start: 2025-05-15 | End: 2025-05-17 | Stop reason: HOSPADM

## 2025-05-15 RX ADMIN — HEPARIN SODIUM 5000 UNITS: 5000 INJECTION INTRAVENOUS; SUBCUTANEOUS at 09:05

## 2025-05-15 RX ADMIN — CARVEDILOL 3.12 MG: 3.12 TABLET, FILM COATED ORAL at 05:05

## 2025-05-15 RX ADMIN — SODIUM BICARBONATE 650 MG: 650 TABLET ORAL at 08:05

## 2025-05-15 RX ADMIN — CARVEDILOL 3.12 MG: 3.12 TABLET, FILM COATED ORAL at 08:05

## 2025-05-15 RX ADMIN — NITROGLYCERIN 0.4 MG: 0.4 TABLET SUBLINGUAL at 01:05

## 2025-05-15 RX ADMIN — PREGABALIN 50 MG: 25 CAPSULE ORAL at 08:05

## 2025-05-15 RX ADMIN — CYCLOSPORINE 50 MG: 25 CAPSULE, LIQUID FILLED ORAL at 05:05

## 2025-05-15 RX ADMIN — ZOLPIDEM TARTRATE 5 MG: 5 TABLET ORAL at 09:05

## 2025-05-15 RX ADMIN — ASPIRIN 81 MG: 81 TABLET, COATED ORAL at 08:05

## 2025-05-15 RX ADMIN — NIFEDIPINE 60 MG: 60 TABLET, FILM COATED, EXTENDED RELEASE ORAL at 08:05

## 2025-05-15 RX ADMIN — SERTRALINE HYDROCHLORIDE 25 MG: 25 TABLET ORAL at 08:05

## 2025-05-15 RX ADMIN — TIZANIDINE 4 MG: 4 TABLET ORAL at 11:05

## 2025-05-15 RX ADMIN — ACETAMINOPHEN 650 MG: 325 TABLET ORAL at 08:05

## 2025-05-15 RX ADMIN — HEPARIN SODIUM 5000 UNITS: 5000 INJECTION INTRAVENOUS; SUBCUTANEOUS at 03:05

## 2025-05-15 RX ADMIN — HEPARIN SODIUM 5000 UNITS: 5000 INJECTION INTRAVENOUS; SUBCUTANEOUS at 07:05

## 2025-05-15 RX ADMIN — PANTOPRAZOLE SODIUM 40 MG: 40 TABLET, DELAYED RELEASE ORAL at 08:05

## 2025-05-15 RX ADMIN — POLYETHYLENE GLYCOL 3350 17 G: 17 POWDER, FOR SOLUTION ORAL at 08:05

## 2025-05-15 NOTE — ASSESSMENT & PLAN NOTE
-Troponin 0.139>0.095>0.106  -Elevation likely secondary to demand ischemia from GRACE, hypotension, anemia  -Recent TTE 4/2025 with normal EF  -Prior Cardiac PET 11/2024 negative for ischemia/scar  -CP on exam is atypical and reproducible  -Continue OMT as tolerated    5/15/2025  -Recurrent CP after working with PT/OT; marked tenderness on exam  -Troponin and EKG pending  -Being transferred to [facility]

## 2025-05-15 NOTE — PROGRESS NOTES
[facility] Telemetry ([facility])  Cardiology  Progress Note    Patient Name: Nadia Damon  MRN: [id number]  Admission Date: 5/12/2025  Hospital Length of Stay: 3 days  Code Status: Partial Code   Attending Physician: Ra Rowe MD   Primary Care Physician: Elis Wick MD  Expected Discharge Date: 5/17/2025  Principal Problem:Syncope    Subjective:   HPI:  Ms. Damon is a 69 year old female patient whose current medical conditions include anemia, leukopenia, breast CA s/p excision and chemo/radiation, cervical spine DJD, and remote history of OHT in 5/1993 with PPM placed at same time who presented to [facility] ED yesterday from primary care clinic due to a near syncopal episode. Patient became lightheaded and dizzy yesterday after her appointment while waiting for her Lyft and eventually fell to the ground. The  at the clinic was able to abort the fall, and patient did not suffer any injury but she continued to feel lethargic and fatigued. Her BP was taken and she was found to be notably hypotensive (90/52 and ? 80/40) and thus EMS was called to transport her to the emergency room. Initial labs revealed troponin of 0.139, GRACE (creatinine of 3.9), BNP of 461, and mild anemia (9.5/29.2), and patient subsequently admitted for further evaluation and treatment. Cardiology consulted to assist with management. Patient seen and examined today, resting in bed. Feels ok. Still weak/tired. Reports she was just recently admitted and discharged from [facility] on 5/6/2025. Suffered a brief cardiac arrest during that admission (rhythm unknown, required 1 round of CPR and 1 round of epi). LHC was deferred at that time given patient's renal function and prior negative Cardiac PET in 11/2024. Patient endorses having ongoing issues with MSK chest wall/soreness since that time. Labs reviewed. Troponin elevated but flat, 0.139>0.095>0.106. EKG without acute ischemic changes. TTE 4/2025 with normal EF.  "Creatinine improved to 3.5. Orthostatic vitals negative upon admission. H/H dipped to 7.6/23.4 this AM, consider transfusion.       Hospital Course:   5/14/2025-Patient seen and examined today, eating lunch. Feeling much better. Less fatigued. Still has some chest tenderness/soreness. Labs reviewed. Creatinine 3.1. H/H improved post transfusion. Orthostatics pending.    5/15/2025-Patient seen and examined today, sitting up in bedside chair. Reported chest "constriction"/soreness after straining to have a BM, improved with muscle relaxant. Labs reviewed. Creatinine 3.6. H/H 9.0/27.7. Apparently after we saw patient, she became hypotension and experienced CP 10/10 CP after working with PT/OT. Sublingual nitro given with improvement, being transferred to [facility].      Review of Systems   Constitutional: Positive for malaise/fatigue.   HENT: Negative.     Eyes: Negative.    Cardiovascular:  Positive for chest pain (atypical).   Respiratory: Negative.     Endocrine: Negative.    Hematologic/Lymphatic: Negative.    Skin: Negative.    Musculoskeletal:  Positive for arthritis.   Gastrointestinal:  Positive for constipation.   Genitourinary: Negative.    Neurological:  Positive for weakness.   Psychiatric/Behavioral: Negative.     Allergic/Immunologic: Negative.      Objective:     Vital Signs (Most Recent):  Temp: 98.3 °F (36.8 °C) (05/15/25 1317)  Pulse: 105 (05/15/25 1317)  Resp: 17 (05/15/25 1317)  BP: (!) 90/54 (05/15/25 1317)  SpO2: 95 % (05/15/25 1317) Vital Signs (24h Range):  Temp:  [97.7 °F (36.5 °C)-98.9 °F (37.2 °C)] 98.3 °F (36.8 °C)  Pulse:  [] 105  Resp:  [17-20] 17  SpO2:  [94 %-97 %] 95 %  BP: ()/(54-87) 90/54     Weight: 62.1 kg (137 lb)  Body mass index is 25.06 kg/m².     SpO2: 95 %         Intake/Output Summary (Last 24 hours) at 5/15/2025 1423  Last data filed at 5/14/2025 1949  Gross per 24 hour   Intake 228.76 ml   Output --   Net 228.76 ml       Lines/Drains/Airways       Peripheral " "Intravenous Line  Duration                  Peripheral IV - Single Lumen 05/13/25 1545 22 G Anterior;Distal;Right Forearm 1 day                       Physical Exam  Vitals and nursing note reviewed.   Constitutional:       General: She is not in acute distress.     Appearance: Normal appearance. She is well-developed. She is not diaphoretic.   HENT:      Head: Normocephalic and atraumatic.   Eyes:      General:         Right eye: No discharge.         Left eye: No discharge.      Pupils: Pupils are equal, round, and reactive to light.   Neck:      Thyroid: No thyromegaly.   Cardiovascular:      Rate and Rhythm: Normal rate and regular rhythm.      Heart sounds: Normal heart sounds, S1 normal and S2 normal. No murmur heard.     Comments: +Chest wall tenderness  Pulmonary:      Effort: Pulmonary effort is normal. No respiratory distress.   Musculoskeletal:      Right lower leg: No edema.      Left lower leg: No edema.   Skin:     General: Skin is warm and dry.      Findings: No erythema.   Neurological:      Mental Status: She is alert and oriented to person, place, and time.   Psychiatric:         Mood and Affect: Mood normal.         Behavior: Behavior normal.            Significant Labs: CMP   Recent Labs   Lab 05/14/25  0510 05/15/25  0443    140   K 4.1 4.7    106   CO2 21* 23   GLU 92 88   BUN 35* 48*   CREATININE 3.1* 3.6*   CALCIUM 8.5* 8.4*   ANIONGAP 13 11   , CBC   Recent Labs   Lab 05/14/25  0510 05/15/25  0443   WBC 4.38 3.97   HGB 9.1* 9.0*   HCT 28.1* 27.7*    206   , Troponin No results for input(s): "TROPONINIHS" in the last 48 hours., and All pertinent lab results from the last 24 hours have been reviewed.    Significant Imaging: Echocardiogram: Transthoracic echo (TTE) complete (Cupid Only):   Results for orders placed or performed during the hospital encounter of 04/24/25   Echo   Result Value Ref Range    LV Diastolic Volume 71 mL    Echo EF Estimated 40 %    LV Systolic Volume " 43 mL    IVS 0.9 0.6 - 1.1 cm    LVIDd 4.0 3.5 - 6.0 cm    LVIDs 3.3 2.1 - 4.0 cm    LVOT diameter 2.0 cm    PW 0.9 0.6 - 1.1 cm    AV LVOT peak gradient 3 mmHg    LVOT mn grad 2 mmHg    LVOT peak jacky 0.9 m/s    LVOT peak VTI 20.6 cm    RV- marie basal diam 3.4 cm    AV valve area 4.3 cm2    AV area by cont VTI 4.3 cm2    AV peak gradient 3 mmHg    AV mean gradient 1 mmHg    Ao peak jacky 0.8 m/s    Ao VTI 14.9 cm    MV Peak A Jacky 0.59 m/s    E wave deceleration time 138 ms    MV Peak E Jacky 0.72 m/s    E/A ratio 1.22     LV LATERAL E/E' RATIO 9.0     LV SEPTAL E/E' RATIO 12.0     TDI LATERAL 0.08 m/s    TDI SEPTAL 0.06 m/s    TV peak gradient 26 mmHg    TR Max Jacky 2.5 m/s    Ascending aorta 2.4 cm    STJ 3.1 cm    Sinus 3.11 cm    TAPSE 0.9 cm    BSA 1.74 m2    LVOT stroke volume 64.7 cm3    LV Systolic Volume Index 25.3 mL/m2    LV Diastolic Volume Index 41.76 mL/m2    LVOT area 3.1 cm2    FS 17.5 (A) 28 - 44 %    Left Ventricle Relative Wall Thickness 0.45 cm    LV mass 109.7 g    LV Mass Index 64.5 g/m2    E/E' ratio 10 m/s    RV/LV Ratio 0.85 cm    AV Velocity Ratio 1.13     AV index (prosthetic) 1.38     PRESLEY by Velocity Ratio 3.5 cm²    Mean e' 0.07 m/s    ZLVIDS 0.95     ZLVIDD -1.67     EF 45 %    TV resting pulmonary artery pressure 28 mmHg    RV TB RVSP 6 mmHg    Est. RA pres 3 mmHg    Narrative      S/P heart transplant 1993.    Left Ventricle: The left ventricle is normal in size. Normal wall   thickness. There is concentric remodeling. There is mildly reduced   systolic function. Ejection fraction is approximately 45%. Mild global   hypokinesis present. Contractility is better preserved in the basal   segements. There is normal diastolic function.    Right Ventricle: The right ventricle is normal in size. Systolic   function is moderately reduced. TAPSE is 0.9 cm.    Tricuspid Valve: There is mild to moderate regurgitation.    Pulmonary Artery: The estimated pulmonary artery systolic pressure is   28  mmHg.    IVC/SVC: Normal venous pressure at 3 mmHg.      and EKG: Reviewed  Assessment and Plan:   Patient who presents with syncope likely due to GRACE/volume depletion/anemia. Still with atypical/MSK chest pain, had severe episode after working with PT/OT. Continue OMT as tolerated. Being transferred to [facility].     * Syncope  -Presents s/p near syncopal episode, denies LOC  -Trop elevated but flat  -Orthostatic vitals negative  -Noted to have GRACE and hypotension upon admission-->likely cause of episode  -H/H also dipped to 7.6/23.4 this AM, needs further and transfusion w/u per primary team  -Recent TTE 4/2025 with normal EF; prior Cardiac PET 11/2024 negative for ischemia/scar    5/12/2025  -Stable CV wise  -Orthostatics pending    Normocytic anemia  -H/H dipped to 7.6/23.4, consider transfusion  -Mgmt per primary team      5/14/2025  -Improved post transfusion    Elevated troponin  -Troponin 0.139>0.095>0.106  -Elevation likely secondary to demand ischemia from GRACE, hypotension, anemia  -Recent TTE 4/2025 with normal EF  -Prior Cardiac PET 11/2024 negative for ischemia/scar  -CP on exam is atypical and reproducible  -Continue OMT as tolerated    5/15/2025  -Recurrent CP after working with PT/OT; marked tenderness on exam  -Troponin and EKG pending  -Being transferred to [facility]    Acute kidney injury superimposed on stage 4 chronic kidney disease  -Improved with IVF  -Mgmt per primary team    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  -Continue OMT as tolerated    Chest pain  -Atypical, MSK in nature likely due to recent CPR    5/15/205  -Patient with CP/hypotension after working with PT/OT  -Repeat labs and EKG pending  -Being transferred to [facility]    Essential hypertension  -Resume home regimen as tolerated        VTE Risk Mitigation (From admission, onward)           Ordered     heparin (porcine) injection 5,000 Units  Every 8 hours         05/12/25 2146     IP VTE  HIGH RISK PATIENT  Once         05/12/25 2146     Place sequential compression device  Until discontinued         05/12/25 2146                    Nilda Rehman PA-C  Cardiology  [facility] Telemetry ([facility])

## 2025-05-15 NOTE — PLAN OF CARE
A225/A225 NAYEYaneth Damon is a 69 y.o.female admitted on 5/12/2025 for Syncope   Code Status: Partial Code MRN: [id number]   Review of patient's allergies indicates:   Allergen Reactions    Dobutamine Other (See Comments)     Severe Left sided chest pain after dosage administered for Dobutamine Stress Test; itching    Lisinopril Swelling and Rash    Hydrocodone-acetaminophen Nausea Only    Augmentin [amoxicillin-pot clavulanate] Diarrhea    Zyvox [linezolid] Nausea And Vomiting     Past Medical History:   Diagnosis Date    Abdominal wall hernia     CT Renal 6/11/2018---Small fat containing superior ventral abdominal wall hernia at the epicardial pacing lead site.    Abnormal mammogram 10/12/2021    Acute cystitis without hematuria 05/10/2022    Acute ischemic right middle cerebral artery (MCA) stroke 07/20/2024    Adverse drug reaction 11/12/2024    GRACE (acute kidney injury) 11/22/2021    Anxiety     Aphasia 07/19/2024    Arthritis     ZEN HIPS    Breast cancer in female 08/2021    LEFT BREAST    Breast mass, right 11/13/2024    RIGHT BREAST MASS (Problem)      Bronchitis 08/18/2016    Never smoked      C. difficile colitis 11/29/2021    Cellulitis of axilla, left 12/23/2021    Chronic diastolic heart failure 12/16/2021    Chronic kidney disease     stage 4, GFR 15-29 ml/min    Chronic midline low back pain without sciatica 06/18/2018    CKD (chronic kidney disease) stage 4, GFR 15-29 ml/min 04/20/2016    US Retro   5/16/2018---Mild cortical thinning and increased cortical echogenicity.  Findings can be seen with medical renal disease.  8/31/216--- Echogenic kidneys with diffuse cortical thinning suggesting  medical renal disease. 2 complex right renal cortical cysts.      Closed nondisplaced fracture of distal phalanx of left great toe with routine healing 10/22/2018    Coronary artery disease 1993    heart transplant    COVID-19 in immunocompromised patient 02/26/2024    Cystitis 05/10/2022    Depression      Encounter for blood transfusion     Encounter for palliative care 10/14/2024    Fibromyalgia     on Lyrica    Heart failure     native heart cardiomyopathy    Heart transplanted 1993    due to cardiomyopathy    History of hyperparathyroidism; Hyperparathyroidism, secondary renal     PT DENIES    Hypertension     Immune disorder     anti rejection meds    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    Impaired mobility 07/28/2022    Iron deficiency anemia 08/15/2017    Kidney stones     passed per pt    Obesity     Obesity (BMI 30.0-34.9) 07/22/2019    Other osteoporosis without current pathological fracture 08/30/2019    Other pancytopenia 05/06/2024    Parotitis, acute 09/19/2023    Pharyngitis 01/09/2019    Pneumonia due to infectious organism 03/14/2024    PONV (postoperative nausea and vomiting)     Severe sepsis 11/22/2021    Shingles 2003 approx    left leg    Stage 4 chronic kidney disease 11/22/2021    Subclinical hypothyroidism 06/16/2023    Thrombocytopenia, unspecified 11/29/2021    Trouble in sleeping     Unresponsiveness 11/13/2024    Urinary incontinence       PRN meds    0.9%  NaCl infusion (for blood administration), , Q24H PRN  acetaminophen, 650 mg, Q6H PRN  acetaminophen, 650 mg, Q6H PRN  aluminum-magnesium hydroxide-simethicone, 30 mL, QID PRN  dextrose 50%, 12.5 g, PRN  dextrose 50%, 25 g, PRN  glucagon (human recombinant), 1 mg, PRN  glucose, 16 g, PRN  glucose, 24 g, PRN  hydrALAZINE, 10 mg, Q8H PRN  naloxone, 0.02 mg, PRN  nitroGLYCERIN, 0.4 mg, Q5 Min PRN  ondansetron, 4 mg, Q8H PRN  polyethylene glycol, 17 g, Daily PRN  promethazine, 25 mg, Q6H PRN  simethicone, 1 tablet, QID PRN  sodium chloride 0.9%, 3 mL, Q12H PRN  tiZANidine, 4 mg, BID PRN  zolpidem, 5 mg, Nightly PRN         Pt oriented x4.    Pt remained afebrile throughout this shift.   All meds administered per order.   Pt remained free of falls this shift.   Plan of care reviewed. Patient verbalizes understanding.    Pt moving/turning with assistx1  Bed low, side rails up x 2, wheels locked, call light in reach.   Patient instructed to call for assistance.  Patient education provided               Orientation: oriented x 4  [place] Coma Scale Score: 15     Lead Monitored: Lead II Rhythm: sinus tachycardia    Cardiac/Telemetry Box Number: [id number]  VTE Core Measure: Pharmacological prophylaxis initiated/maintained Last Bowel Movement: 05/15/25  Diet Heart Healthy Fluid - 2000mL  Voiding Characteristics: external catheter  Gabe Score: 17  Fall Risk Score: 21  Accucheck []   Freq?      Lines/Drains/Airways       Peripheral Intravenous Line  Duration                  Peripheral IV - Single Lumen 05/13/25 1545 22 G Anterior;Distal;Right Forearm 2 days

## 2025-05-15 NOTE — PT/OT/SLP EVAL
"Occupational Therapy Evaluation and Treatment    Name: Nadia Damon  MRN: [id number]  Admitting Diagnosis: Syncope  Recent Surgery: * No surgery found *      Recommendations:     Discharge Recommendations: Low Intensity Therapy  Level of Assistance Recommended: 24 hours light assistance and 24 hours supervision  Discharge Equipment Recommendations: none  Barriers to discharge: Decreased caregiver support    Assessment:     Nadia Damon is a 69 y.o. female with a medical diagnosis of Syncope. She presents with performance deficits affecting function including weakness, impaired endurance, impaired self care skills, impaired functional mobility, impaired balance, impaired cardiopulmonary response to activity, decreased safety awareness, pain, decreased upper extremity function, decreased lower extremity function.    Rehab Prognosis: Fair; patient would benefit from acute OT services to address these deficits and reach maximum level of function.    Plan:     Patient to be seen 2 x/week to address the above listed problems via self-care/home management, therapeutic activities, therapeutic exercises  Plan of Care Expires: 05/28/25  Plan of Care Reviewed with: patient    Subjective     Chief Complaint: Reported "My chest is still sore from those chest compressions a little while ago."  Patient Comments/Goals: increase independence   Pain/Comfort:  Pain Rating 1:  (reports continued soreness from chest compressions)  Pain Addressed 1:  (activity pacing)    Social History:  Living Environment: Patient lives alone in a 1st floor senior living apartment. Tub/shower combo in bathroom.  Prior Level of Function: Prior to admission, patient was modified independent with ADLs and household distance ambulation via RW.  Roles and Routines: Patient was not driving and retired prior to admission.  Equipment Used at Home: walker, rolling, cane, straight, bath bench  DME owned (not currently used): none  Assistance Upon Discharge: " none reported    Objective:     Completed EPIC chart review prior to session. Patient found supine with bed alarm, peripheral IV, telemetry upon OT entry to room.    General Precautions: Standard, fall   Orthopedic Precautions: N/A   Braces: N/A    Respiratory Status: Room air    Occupational Performance    Gait belt applied - Yes    Bed Mobility:   Supine to sit from left side of bed with stand by assistance    Functional Mobility/Transfers:  Sit <> Stand Transfer with stand by assistance with rolling walker  Bed <> Chair Transfer using Step Transfer technique with stand by assistance with rolling walker  Functional Mobility: Patient completed x220ft functional mobility with RW and SBA to increase dynamic standing balance and activity tolerance needed for ADL completion.  V/c for technique with transfers to increase safety and independence with completion    Activities of Daily Living:  Grooming: modified independence  Upper Body Dressing: set up assistance    Cognitive/Visual Perceptual:  Cognitive/Psychosocial Skills:    -     Oriented to: Person, Place, and Situation  -     Follows Commands/attention: Easily distracted and Follows one-step commands    Physical Exam:  Balance:    -     Sitting: supervision  -     Standing: stand by assistance  Upper Extremity Range of Motion:     -       Right Upper Extremity: WFL except shdr <20* (guarding due to pain in chest)  -       Left Upper Extremity: WFL except shdr <20* (guarding due to pain in chest)  Upper Extremity Strength:    -       Right Upper Extremity: grossly 4-/5 elbows distally  -       Left Upper Extremity: grossly 4-/5 elbows distally   Strength: Right- poor, left- poor +    AMPAC 6 Click ADL:  AMPAC Total Score: 21    Treatment & Education:  Therapist provided facilitation and instruction of proper body mechanics, energy conservation, and fall prevention strategies during tasks listed above  Patient educated on role of OT, POC, and goals for  therapy  Patient educated on importance of OOB activities with staff member assistance and sitting OOB majority of the day  Encouraged completion of B UE AROM therex throughout the day to increase functional strength and activity tolerance needed for ADL completion.    Patient left up in chair with all lines intact, call button in reach, and chair alarm on.    GOALS:   Multidisciplinary Problems       Occupational Therapy Goals          Problem: Occupational Therapy    Goal Priority Disciplines Outcome Interventions   Occupational Therapy Goal     OT, PT/OT     Description: Goals to be met by: 5/29/25     Patient will increase functional independence with ADLs by performing:    Toileting from toilet with Supervision for hygiene and clothing management.   Toilet transfer to toilet with Supervision.  Increased functional strength in B UE grossly by 1/2 MM grade.                       DME Justifications:  No DME recommended requiring DME justifications    History:     Past Medical History:   Diagnosis Date    Abdominal wall hernia     CT Renal 6/11/2018---Small fat containing superior ventral abdominal wall hernia at the epicardial pacing lead site.    Abnormal mammogram 10/12/2021    Acute cystitis without hematuria 05/10/2022    Acute ischemic right middle cerebral artery (MCA) stroke 07/20/2024    Adverse drug reaction 11/12/2024    GRACE (acute kidney injury) 11/22/2021    Anxiety     Aphasia 07/19/2024    Arthritis     ZEN HIPS    Breast cancer in female 08/2021    LEFT BREAST    Breast mass, right 11/13/2024    RIGHT BREAST MASS (Problem)      Bronchitis 08/18/2016    Never smoked      C. difficile colitis 11/29/2021    Cellulitis of axilla, left 12/23/2021    Chronic diastolic heart failure 12/16/2021    Chronic kidney disease     stage 4, GFR 15-29 ml/min    Chronic midline low back pain without sciatica 06/18/2018    CKD (chronic kidney disease) stage 4, GFR 15-29 ml/min 04/20/2016    US Retro   5/16/2018---Mild  cortical thinning and increased cortical echogenicity.  Findings can be seen with medical renal disease.  8/31/216--- Echogenic kidneys with diffuse cortical thinning suggesting  medical renal disease. 2 complex right renal cortical cysts.      Closed nondisplaced fracture of distal phalanx of left great toe with routine healing 10/22/2018    Coronary artery disease 1993    heart transplant    COVID-19 in immunocompromised patient 02/26/2024    Cystitis 05/10/2022    Depression     Encounter for blood transfusion     Encounter for palliative care 10/14/2024    Fibromyalgia     on Lyrica    Heart failure     native heart cardiomyopathy    Heart transplanted 1993    due to cardiomyopathy    History of hyperparathyroidism; Hyperparathyroidism, secondary renal     PT DENIES    Hypertension     Immune disorder     anti rejection meds    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    Impaired mobility 07/28/2022    Iron deficiency anemia 08/15/2017    Kidney stones     passed per pt    Obesity     Obesity (BMI 30.0-34.9) 07/22/2019    Other osteoporosis without current pathological fracture 08/30/2019    Other pancytopenia 05/06/2024    Parotitis, acute 09/19/2023    Pharyngitis 01/09/2019    Pneumonia due to infectious organism 03/14/2024    PONV (postoperative nausea and vomiting)     Severe sepsis 11/22/2021    Shingles 2003 approx    left leg    Stage 4 chronic kidney disease 11/22/2021    Subclinical hypothyroidism 06/16/2023    Thrombocytopenia, unspecified 11/29/2021    Trouble in sleeping     Unresponsiveness 11/13/2024    Urinary incontinence      Past Surgical History:   Procedure Laterality Date    bladder implant Right 06/11/2024    BLADDER SURGERY  2015 approx    mesh - Dr Everett then 2nd reconstructive sx Dr Onofre    BREAST BIOPSY Bilateral     NEGATIVE    BREAST BIOPSY Right 10/31/2022    benign    BREAST LUMPECTOMY Left 2021    BREAST SURGERY Left 09/28/2015    Bx - benign    BREAST  SURGERY Right 12/2015    Bx benign    CARDIAC PACEMAKER REMOVAL Left 06/26/2014    Pacer defirillator removed. Put in 1993 aat time of heart transplant    CARPAL TUNNEL RELEASE Left 03/03/2015    Dr. Hall    COLONOSCOPY N/A 02/25/2021    Procedure: COLONOSCOPY;  Surgeon: Freida Ramirez MD;  Location: [facility] ENDO;  Service: Endoscopy;  Laterality: N/A;    CYSTOCELE REPAIR      Twice with mesh removal    ECHOCARDIOGRAM,TRANSESOPHAGEAL N/A 4/26/2025    Procedure: Transesophageal echo (AYAAN) intra-procedure log documentation;  Surgeon: Barron Chinchilla MD;  Location: [facility] OR [address] [place] [address];  Service: Cardiothoracic;  Laterality: N/A;    EPIDURAL STEROID INJECTION INTO CERVICAL SPINE N/A 02/02/2023    Procedure: T11/T12 IL HELLEN;  Surgeon: Jassi Pierre MD;  Location: [facility] PAIN MGT;  Service: Pain Management;  Laterality: N/A;    EPIDURAL STEROID INJECTION INTO CERVICAL SPINE N/A 9/16/2024    Procedure: T11/T12 IL HELLEN;  Surgeon: Jassi Pierre MD;  Location: [facility] PAIN MGT;  Service: Pain Management;  Laterality: N/A;    HEART TRANSPLANT  1993    HERNIA REPAIR Right 1971 approx    Inguinal    HYSTERECTOMY  1983    vag hyst /LSO     IMPLANTATION OF PERMANENT SACRAL NERVE STIMULATOR N/A 06/11/2024    Procedure: INSERTION, NEUROSTIMULATOR, PERMANENT, SACRAL;  Surgeon: Sami Cochran MD;  Location: [facility] OR;  Service: Urology;  Laterality: N/A;    INCISION AND DRAINAGE OF ABSCESS Left 12/24/2021    Procedure: INCISION AND DRAINAGE, ABSCESS;  Surgeon: Joseph Longo MD;  Location: [facility] OR;  Service: General;  Laterality: Left;    INJECTION OF ANESTHETIC AGENT AROUND MEDIAL BRANCH NERVES INNERVATING LUMBAR FACET JOINT Right 10/19/2022    Procedure: Right L4/L5 and L5/S1 MBB;  Surgeon: Jassi Pierre MD;  Location: [facility] PAIN MGT;  Service: Pain Management;  Laterality: Right;    INJECTION OF ANESTHETIC AGENT AROUND MEDIAL BRANCH NERVES INNERVATING LUMBAR FACET JOINT Right 11/09/2022    Procedure: Right L4/L5 and  L5/S1 MBB;  Surgeon: Jassi Pierre MD;  Location: [facility] PAIN MGT;  Service: Pain Management;  Laterality: Right;    INJECTION OF ANESTHETIC AGENT AROUND MEDIAL BRANCH NERVES INNERVATING LUMBAR FACET JOINT Left 2/6/2025    Procedure: Left L4-5 and L5-S1 MBB #1 with local;  Surgeon: Jassi Pierre MD;  Location: [facility] PAIN MGT;  Service: Pain Management;  Laterality: Left;    INJECTION OF ANESTHETIC AGENT AROUND MEDIAL BRANCH NERVES INNERVATING LUMBAR FACET JOINT Left 3/19/2025    Procedure: Left L4-5 and L5-S1 MBB #2 with local;  Surgeon: Jassi Pierre MD;  Location: [facility] PAIN MGT;  Service: Pain Management;  Laterality: Left;    INJECTION OF ANESTHETIC AGENT INTO SACROILIAC JOINT Right 08/22/2022    Procedure: Right SIJ Injection Right L5/S1 Facte Injection;  Surgeon: Jassi Pierre MD;  Location: [facility] PAIN MGT;  Service: Pain Management;  Laterality: Right;    INSERTION OF TUNNELED CENTRAL VENOUS CATHETER (CVC) WITH SUBCUTANEOUS PORT N/A 11/09/2021    Procedure: [id number];  Surgeon: Christoph Douglas MD;  Location: [facility] OR;  Service: General;  Laterality: N/A;    RADIOFREQUENCY ABLATION Right 12/5/2024    Procedure: Right L4-5 and L5-S1 RFA;  Surgeon: Jassi Pierre MD;  [facility];  Service: Pain Management;  Laterality: Right;    RADIOFREQUENCY THERMOCOAGULATION Right 12/07/2022    Procedure: Right L4/L5 and L5/S1 Lumbar RFA;  Surgeon: Jassi Pierre MD;  Location: [facility] PAIN [facility]GT;  Service: Pain Management;  Laterality: Right;    REMOVAL OF ELECTRODE LEAD OF SACRAL NERVE STIMULATOR N/A 2/18/2025    Procedure: REMOVAL, ELECTRODE LEAD, SACRAL NERVE STIMULATOR;  Surgeon: Sami Cochran MD;  Location: [facility] OR;  Service: Urology;  Laterality: N/A;    REMOVAL OF VASCULAR ACCESS PORT      ROBOT-ASSISTED LAPAROSCOPIC ABDOMINAL SACROCOLPOPEXY N/A 08/10/2023    Procedure: ROBOTIC SACROCOLPOPEXY, ABDOMEN;  Surgeon: PRANAY Villalobos MD;  Location: [facility] OR;  Service: OB/GYN;   Laterality: N/A;    ROBOT-ASSISTED LAPAROSCOPIC OOPHORECTOMY Right 08/10/2023    Procedure: ROBOTIC OOPHORECTOMY;  Surgeon: PRANAY Villalobos MD;  Location: [facility] OR;  Service: OB/GYN;  Laterality: Right;    SENTINEL LYMPH NODE BIOPSY Left 10/12/2021    Procedure: BIOPSY, LYMPH NODE, SENTINEL;  Surgeon: Christoph Douglas MD;  Location: [facility] OR;  Service: General;  Laterality: Left;    TOE SURGERY      XI ROBOTIC URETHROPEXY N/A 08/10/2023    Procedure: XI ROBOTIC URETHROPEXY;  Surgeon: PRANAY Villalobos MD;  Location: [facility];  Service: OB/GYN;  Laterality: N/A;     Time Tracking:     OT Date of Treatment: 05/15/25  OT Start Time: 0940  OT Stop Time: 1005  OT Total Time (min): 25 min    Billable Minutes: Evaluation 15 and Therapeutic Activity 10    Caitlin Harris, OT  5/15/2025

## 2025-05-15 NOTE — PT/OT/SLP EVAL
Physical Therapy Evaluation and Treatment    Patient Name: Nadia Damon   MRN: [id number]  Recent Surgery: * No surgery found *      Recommendations:     Discharge Recommendations: Low Intensity Therapy   Discharge Equipment Recommendations: none   Barriers to discharge: None    Assessment:     Nadia Damon is a 69 y.o. female admitted with a medical diagnosis of Syncope. She presents with the following impairments/functional limitations: weakness, impaired endurance, impaired functional mobility, gait instability, impaired balance, decreased safety awareness, decreased lower extremity function, pain.    Rehab Prognosis: Good; patient would benefit from acute PT services to address these deficits and reach maximum level of function.    Plan:     During this hospitalization, patient to be seen 3 x/week to address the above listed problems via gait training, therapeutic activities, therapeutic exercises    Plan of Care Expires: 05/29/25    Subjective     Chief Complaint: Pt is motivated to participate  Patient Comments/Goals: none stated  Pain/Comfort:  Pain Rating 1:  (reports continued soreness from chest compressions)    Social History:  Living Environment: Patient lives alone in a first floor apartment at an independent living facility with number of outside stair(s): 0. Report she is arranging for a caregiver to be with her.   Prior Level of Function: Prior to admission, patient was independent and ambulated household and community distances using rolling walker  Equipment Used at Home: walker, rolling, cane, straight, bath bench  DME owned (not currently used): none  Assistance Upon Discharge: facility staff    Objective:     Communicated with epic chart review prior to session. Patient found HOB elevated with peripheral IV, telemetry, bed alarm upon PT entry to room.    General Precautions: Standard, fall   Orthopedic Precautions: N/A   Braces: N/A    Respiratory Status: Room air    Exams:  Cognition:  "Patient is oriented to Person, Place, Time, Situation  RLE ROM: WFL  RLE Strength: Grossly 4/5  LLE ROM: WFL  LLE Strength: Grossly 4/5  Sensation:    -       Intact  Skin Integrity/Edema:     -       Skin integrity: Visible skin intact    Functional Mobility:  Gait belt applied - Yes  Bed Mobility  Rolling Left: stand by assistance  Scooting: stand by assistance  Supine to Sit: stand by assistance  Transfers  Sit to Stand: stand by assistance with rolling walker  Bed to Chair: stand by assistance with rolling walker using Step Transfer  Gait  Patient ambulated 220ft with rolling walker and stand by assistance. Patient demonstrates occasional unsteady gait. No c/o dizziness or SOB, no gross LOB. All lines remained intact throughout ambulation trail.  Balance  Sitting: stand by assistance  Standing: stand by assistance    Therapeutic Activities and Exercises:   Pt educated on role of PT in acute care and POC. Educated on importance of OOB activities, activity pacing, and HEP (marching/hip flex, hip abd, heel slides/LAQ, quad sets, ankle pumps) in order to maintain/regain strength. Encouraged to sit up in chair for all meals. Educated on proper use of RW for safety and to reduce risk of falling. Educated on "call don't fall" policy and increased risk of falling due to weakness, instructed to utilize call bell for assistance with all transfers. Pt agreeable to all requests.    AM-PAC 6 CLICK MOBILITY  Total Score:16    Patient left up in chair with all lines intact, call button in reach, and chair alarm on.    GOALS:   Multidisciplinary Problems       Physical Therapy Goals          Problem: Physical Therapy    Goal Priority Disciplines Outcome Interventions   Physical Therapy Goal     PT, PT/OT     Description: Goals to be met by 5/29/25.  1. Pt will complete bed mobility MOD I.  2. Pt will complete sit to stand MOD I.  3. Pt will ambulate 200ft MOD I using RW.  4. Pt will increase AMPAC score by 2 points to progress " functional mobility.                       DME Justifications:  No DME recommended requiring DME justifications    History:     Past Medical History:   Diagnosis Date    Abdominal wall hernia     CT Renal 6/11/2018---Small fat containing superior ventral abdominal wall hernia at the epicardial pacing lead site.    Abnormal mammogram 10/12/2021    Acute cystitis without hematuria 05/10/2022    Acute ischemic right middle cerebral artery (MCA) stroke 07/20/2024    Adverse drug reaction 11/12/2024    GRACE (acute kidney injury) 11/22/2021    Anxiety     Aphasia 07/19/2024    Arthritis     ZEN HIPS    Breast cancer in female 08/2021    LEFT BREAST    Breast mass, right 11/13/2024    RIGHT BREAST MASS (Problem)      Bronchitis 08/18/2016    Never smoked      C. difficile colitis 11/29/2021    Cellulitis of axilla, left 12/23/2021    Chronic diastolic heart failure 12/16/2021    Chronic kidney disease     stage 4, GFR 15-29 ml/min    Chronic midline low back pain without sciatica 06/18/2018    CKD (chronic kidney disease) stage 4, GFR 15-29 ml/min 04/20/2016    US Retro   5/16/2018---Mild cortical thinning and increased cortical echogenicity.  Findings can be seen with medical renal disease.  8/31/216--- Echogenic kidneys with diffuse cortical thinning suggesting  medical renal disease. 2 complex right renal cortical cysts.      Closed nondisplaced fracture of distal phalanx of left great toe with routine healing 10/22/2018    Coronary artery disease 1993    heart transplant    COVID-19 in immunocompromised patient 02/26/2024    Cystitis 05/10/2022    Depression     Encounter for blood transfusion     Encounter for palliative care 10/14/2024    Fibromyalgia     on Lyrica    Heart failure     native heart cardiomyopathy    Heart transplanted 1993    due to cardiomyopathy    History of hyperparathyroidism; Hyperparathyroidism, secondary renal     PT DENIES    Hypertension     Immune disorder     anti rejection meds     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    Impaired mobility 07/28/2022    Iron deficiency anemia 08/15/2017    Kidney stones     passed per pt    Obesity     Obesity (BMI 30.0-34.9) 07/22/2019    Other osteoporosis without current pathological fracture 08/30/2019    Other pancytopenia 05/06/2024    Parotitis, acute 09/19/2023    Pharyngitis 01/09/2019    Pneumonia due to infectious organism 03/14/2024    PONV (postoperative nausea and vomiting)     Severe sepsis 11/22/2021    Shingles 2003 approx    left leg    Stage 4 chronic kidney disease 11/22/2021    Subclinical hypothyroidism 06/16/2023    Thrombocytopenia, unspecified 11/29/2021    Trouble in sleeping     Unresponsiveness 11/13/2024    Urinary incontinence        Past Surgical History:   Procedure Laterality Date    bladder implant Right 06/11/2024    BLADDER SURGERY  2015 approx    mesh - Dr Everett then 2nd reconstructive sx Dr Onofre    BREAST BIOPSY Bilateral     NEGATIVE    BREAST BIOPSY Right 10/31/2022    benign    BREAST LUMPECTOMY Left 2021    BREAST SURGERY Left 09/28/2015    Bx - benign    BREAST SURGERY Right 12/2015    Bx benign    CARDIAC PACEMAKER REMOVAL Left 06/26/2014    Pacer defirillator removed. Put in 1993 aat time of heart transplant    CARPAL TUNNEL RELEASE Left 03/03/2015    Dr. Hall    COLONOSCOPY N/A 02/25/2021    Procedure: COLONOSCOPY;  Surgeon: Freida Ramirez MD;  Location: [facility];  Service: Endoscopy;  Laterality: N/A;    CYSTOCELE REPAIR      Twice with mesh removal    ECHOCARDIOGRAM,TRANSESOPHAGEAL N/A 4/26/2025    Procedure: Transesophageal echo (AYAAN) intra-procedure log documentation;  Surgeon: Barron Chinchilla MD;  Location: [facility] OR [address] [place] [address];  Service: Cardiothoracic;  Laterality: N/A;    EPIDURAL STEROID INJECTION INTO CERVICAL SPINE N/A 02/02/2023    Procedure: T11/T12 IL HELLEN;  Surgeon: Jassi Pierre MD;  Location: [facility] PAIN MGT;  Service: Pain Management;  Laterality: N/A;    EPIDURAL  STEROID INJECTION INTO CERVICAL SPINE N/A 9/16/2024    Procedure: T11/T12 IL HELLEN;  Surgeon: Jassi Pierre MD;  Location: [facility] PAIN MGT;  Service: Pain Management;  Laterality: N/A;    HEART TRANSPLANT  1993    HERNIA REPAIR Right 1971 approx    Inguinal    HYSTERECTOMY  1983    vag hyst /LSO     IMPLANTATION OF PERMANENT SACRAL NERVE STIMULATOR N/A 06/11/2024    Procedure: INSERTION, NEUROSTIMULATOR, PERMANENT, SACRAL;  Surgeon: Sami Cochran MD;  Location: [facility] OR;  Service: Urology;  Laterality: N/A;    INCISION AND DRAINAGE OF ABSCESS Left 12/24/2021    Procedure: INCISION AND DRAINAGE, ABSCESS;  Surgeon: Joseph Longo MD;  Location: [facility] OR;  Service: General;  Laterality: Left;    INJECTION OF ANESTHETIC AGENT AROUND MEDIAL BRANCH NERVES INNERVATING LUMBAR FACET JOINT Right 10/19/2022    Procedure: Right L4/L5 and L5/S1 MBB;  Surgeon: Jassi Pierre MD;  Location: [facility] PAIN MGT;  Service: Pain Management;  Laterality: Right;    INJECTION OF ANESTHETIC AGENT AROUND MEDIAL BRANCH NERVES INNERVATING LUMBAR FACET JOINT Right 11/09/2022    Procedure: Right L4/L5 and L5/S1 MBB;  Surgeon: Jassi Pierre MD;  Location: [facility] PAIN MGT;  Service: Pain Management;  Laterality: Right;    INJECTION OF ANESTHETIC AGENT AROUND MEDIAL BRANCH NERVES INNERVATING LUMBAR FACET JOINT Left 2/6/2025    Procedure: Left L4-5 and L5-S1 MBB #1 with local;  Surgeon: Jassi Pierre MD;  Location: [facility] PAIN MGT;  Service: Pain Management;  Laterality: Left;    INJECTION OF ANESTHETIC AGENT AROUND MEDIAL BRANCH NERVES INNERVATING LUMBAR FACET JOINT Left 3/19/2025    Procedure: Left L4-5 and L5-S1 MBB #2 with local;  Surgeon: Jassi Pierre MD;  Location: [facility] PAIN MGT;  Service: Pain Management;  Laterality: Left;    INJECTION OF ANESTHETIC AGENT INTO SACROILIAC JOINT Right 08/22/2022    Procedure: Right SIJ Injection Right L5/S1 Facte Injection;  Surgeon: Jassi Pierre MD;  Location: [facility] PAIN MGT;   Service: Pain Management;  Laterality: Right;    INSERTION OF TUNNELED CENTRAL VENOUS CATHETER (CVC) WITH SUBCUTANEOUS PORT N/A 11/09/2021    Procedure: [id number];  Surgeon: Christoph Douglas MD;  Location: [facility] OR;  Service: General;  Laterality: N/A;    RADIOFREQUENCY ABLATION Right 12/5/2024    Procedure: Right L4-5 and L5-S1 RFA;  Surgeon: Jassi Pierre MD;  Location: [facility] PAIN MGT;  Service: Pain Management;  Laterality: Right;    RADIOFREQUENCY THERMOCOAGULATION Right 12/07/2022    Procedure: Right L4/L5 and L5/S1 Lumbar RFA;  Surgeon: Jassi Pierre MD;  Location: [facility] PAIN MGT;  Service: Pain Management;  Laterality: Right;    REMOVAL OF ELECTRODE LEAD OF SACRAL NERVE STIMULATOR N/A 2/18/2025    Procedure: REMOVAL, ELECTRODE LEAD, SACRAL NERVE STIMULATOR;  Surgeon: Sami Cochran MD;  Location: [facility];  Service: Urology;  Laterality: N/A;    REMOVAL OF VASCULAR ACCESS PORT      ROBOT-ASSISTED LAPAROSCOPIC ABDOMINAL SACROCOLPOPEXY N/A 08/10/2023    Procedure: ROBOTIC SACROCOLPOPEXY, ABDOMEN;  Surgeon: PRANAY Villalobos MD;  Location: [facility] OR;  Service: OB/GYN;  Laterality: N/A;    ROBOT-ASSISTED LAPAROSCOPIC OOPHORECTOMY Right 08/10/2023    Procedure: ROBOTIC OOPHORECTOMY;  Surgeon: PRANAY Villalobos MD;  Location: [facility] OR;  Service: OB/GYN;  Laterality: Right;    SENTINEL LYMPH NODE BIOPSY Left 10/12/2021    Procedure: BIOPSY, LYMPH NODE, SENTINEL;  Surgeon: Christoph Douglas MD;  Location: [facility] OR;  Service: General;  Laterality: Left;    TOE SURGERY      XI ROBOTIC URETHROPEXY N/A 08/10/2023    Procedure: XI ROBOTIC URETHROPEXY;  Surgeon: PRANAY Villalobos MD;  Location: [facility];  Service: OB/GYN;  Laterality: N/A;       Time Tracking:     PT Received On: 05/15/25  PT Start Time: 0941  PT Stop Time: 1011  PT Total Time (min): 30 min     Billable Minutes: Evaluation 20min and Gait Training 10min    5/15/2025

## 2025-05-15 NOTE — PLAN OF CARE
OT april completed. Sup>sit SBA, sit>stand SBA, functional mobility x220ft with RW and SBA, step>pivot to bedside chair with RW and SBA. Recommending low intensity intervention at d/c.

## 2025-05-15 NOTE — MEDICAL/APP STUDENT
[facility] - Telemetry ([facility])  Cardiology  Progress Note    Patient Name: Nadia Damon  MRN: [id number]  Patient Class: IP- Inpatient   Admission Date: 5/12/2025  Length of Stay: 3 days  Attending Physician: Ra Rowe MD  Primary Care Provider: Elis Wick MD    Subjective:   HPI:  Ms. Damon is a 69 year old female patient whose current medical conditions include anemia, leukopenia, breast CA s/p excision and chemo/radiation, cervical spine DJD, and remote history of OHT in 5/1993 with PPM placed at same time who presented to [facility] ED yesterday from primary care clinic due to a near syncopal episode. Patient became lightheaded and dizzy yesterday after her appointment while waiting for her Lyft and eventually fell to the ground. The  at the clinic was able to abort the fall, and patient did not suffer any injury but she continued to feel lethargic and fatigued. Her BP was taken and she was found to be notably hypotensive (90/52 and ? 80/40) and thus EMS was called to transport her to the emergency room. Initial labs revealed troponin of 0.139, GRACE (creatinine of 3.9), BNP of 461, and mild anemia (9.5/29.2), and patient subsequently admitted for further evaluation and treatment. Cardiology consulted to assist with management. Patient seen and examined today, resting in bed. Feels ok. Still weak/tired. Reports she was just recently admitted and discharged from [facility] on 5/6/2025. Suffered a brief cardiac arrest during that admission (rhythm unknown, required 1 round of CPR and 1 round of epi). LHC was deferred at that time given patient's renal function and prior negative Cardiac PET in 11/2024. Patient endorses having ongoing issues with MSK chest wall/soreness since that time. Labs reviewed. Troponin elevated but flat, 0.139>0.095>0.106. EKG without acute ischemic changes. TTE 4/2025 with normal EF. Creatinine improved to 3.5. Orthostatic vitals negative upon admission.  H/H dipped to 7.6/23.4 this AM, consider transfusion.     Hospital Course:  5/14/2025-Patient seen and examined today, eating lunch. Feeling much better. Less fatigued. Still has some chest tenderness/soreness. Labs reviewed. Creatinine 3.1. H/H improved post transfusion. Orthostatics pending.     5/15/2025- Patient seen and examined today. Reports of pounding chest pain when straining in the restroom. Pain improved with tizanidine and nitroglycerin. Labs reviewed. Creatinine increased to 3.6. H/H stable at 9.0/27.7. US carotid resulted in no significant stenosis of either internal carotid artery. Orthostatics negative.    Review of Systems   Constitutional: Negative.    HENT: Negative.     Eyes: Negative.    Respiratory: Negative.     Cardiovascular:  Positive for chest pain (soreness).   Gastrointestinal: Negative.    Endocrine: Negative.    Genitourinary: Negative.    Musculoskeletal:  Positive for arthralgias.   Skin: Negative.    Allergic/Immunologic: Negative.    Neurological: Negative.    Hematological: Negative.    Psychiatric/Behavioral: Negative.       Objective:     Vital Signs (Most Recent):  Temp: 97.7 °F (36.5 °C) (05/15/25 0815)  Pulse: 95 (05/15/25 0815)  Resp: 20 (05/15/25 0815)  BP: (!) 143/85 (05/15/25 0844)  SpO2: 97 % (05/15/25 0815) Vital Signs (24h Range):  Temp:  [97.7 °F (36.5 °C)-98.9 °F (37.2 °C)] 97.7 °F (36.5 °C)  Pulse:  [] 95  Resp:  [18-20] 20  SpO2:  [94 %-98 %] 97 %  BP: (103-152)/(60-92) 143/85     Weight: 62.1 kg (137 lb)  Body mass index is 25.06 kg/m².    Intake/Output Summary (Last 24 hours) at 5/15/2025 1127  Last data filed at 5/14/2025 1949  Gross per 24 hour   Intake 228.76 ml   Output --   Net 228.76 ml        Physical Exam  Vitals and nursing note reviewed.   Constitutional:       General: She is not in acute distress.     Appearance: Normal appearance. She is not ill-appearing or diaphoretic.   HENT:      Head: Normocephalic and atraumatic.   Eyes:      Pupils:  Pupils are equal, round, and reactive to light.   Cardiovascular:      Rate and Rhythm: Normal rate and regular rhythm.      Heart sounds: No murmur heard.     Comments: +TTP chest wall  Pulmonary:      Effort: Pulmonary effort is normal. No respiratory distress.   Abdominal:      General: Bowel sounds are normal.   Musculoskeletal:      Right lower leg: No edema.      Left lower leg: No edema.   Skin:     General: Skin is warm and dry.   Neurological:      Mental Status: She is alert and oriented to person, place, and time.   Psychiatric:         Mood and Affect: Mood normal.         Behavior: Behavior normal.         Thought Content: Thought content normal.               Significant Labs: All pertinent labs within the past 24 hours have been reviewed.  CBC:   Recent Labs   Lab 05/14/25  0510 05/15/25  0443   WBC 4.38 3.97   HGB 9.1* 9.0*   HCT 28.1* 27.7*    206     CMP:   Recent Labs   Lab 05/14/25  0510 05/15/25  0443    140   K 4.1 4.7    106   CO2 21* 23   GLU 92 88   BUN 35* 48*   CREATININE 3.1* 3.6*   CALCIUM 8.5* 8.4*   ANIONGAP 13 11       Significant Imaging: I have reviewed all pertinent imaging results/findings within the past 24 hours.    Results for orders placed or performed during the hospital encounter of 05/12/25   US Carotid Bilateral    Narrative    EXAM:  US CAROTID BILATERAL    CLINICAL HISTORY:  Syncope    TECHNIQUE: Real-time, color, and duplex evaluation (including peak systolic velocities and systolic velocity ratios) of the carotid arteries was performed.    COMPARISON: None    FINDINGS:  Right side: No significant plaque in the right common or right internal carotid artery.  Peak systolic velocity in the right ICA is 26 cm/s.  Systolic velocity ratio is 1.3.  Antegrade flow in the right vertebral artery.    Left side: No significant plaque in the left common or left internal carotid artery.  Peak systolic velocity in the left ICA is 111 cm/s.  Systolic velocity ratio  is 1.6.  Antegrade flow in the left vertebral artery        Impression     No hemodynamically significant stenosis of either internal carotid artery.  Antegrade flow of the bilateral vertebral arteries..      Note: Validated velocity measurements with angiographic measurements, velocity criteria are extrapolated from diameter data as defined by the Society of Radiologists in Ultrasound Consensus Conference Radiology 2003;229;340-46.    Finalized on: 5/14/2025 7:54 PM By:  Elmer Bella MD  [facility] [place]# [id number]      2025-05-14 19:56:22.131     [facility] [place]     EKG 5/12/2025: normal sinus rhythm, RBBB, left axis deviation.       Assessment/Plan:   Ms. Damon presents s/p near syncopal episode, likely mediated by volume depletion/anemia. H/H stabilized. Patient still having episodes of atypical chest pain, relieved with tizanidine and nitroglycerin.    * Syncope  -Presents s/p near syncopal episode, denies LOC  -Trop elevated but flat  -Orthostatic vitals negative  -Noted to have GRACE and hypotension upon admission-->likely cause of episode  -H/H also dipped to 7.6/23.4 this AM, needs further and transfusion w/u per primary team  -Recent TTE 4/2025 with normal EF; prior Cardiac PET 11/2024 negative for ischemia/scar     5/124/2025  -Stable CV wise  -Orthostatics pending     5/15/2025  -Orthostatics negative    Normocytic anemia  -H/H dipped to 7.6/23.4, consider transfusion  -Mgmt per primary team     5/14/2025  -Improved post transfusion    5/15/2025  -Stabilized at 9.0/27.7     Elevated troponin  -Troponin 0.139>0.095>0.106  -Elevation likely secondary to demand ischemia from GRACE, hypotension, anemia  -Recent TTE 4/2025 with normal EF  -Prior Cardiac PET 11/2024 negative for ischemia/scar  -CP on exam is atypical and reproducible  -Continue OMT as tolerated     Acute kidney injury superimposed on stage 4 chronic kidney disease  -Improved with IVF  -Mgmt per primary team    5/15/2025  -Creatinine increased to 3.6  -Continue to  monitor labs     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  -Continue OMT as tolerated     Chest pain  -Atypical, MSK in nature likely due to recent CPR     Essential hypertension  -Resume home regimen as tolerated      VTE Risk Mitigation (From admission, onward)           Ordered     heparin (porcine) injection 5,000 Units  Every 8 hours         05/12/25 2146     IP VTE HIGH RISK PATIENT  Once         05/12/25 2146     Place sequential compression device  Until discontinued         05/12/25 2146                       [facility] - Telemetry ([facility])

## 2025-05-15 NOTE — PLAN OF CARE
PT EVAL complete. Required SBA for bed mobility, ambulated 220ft SBA using RW. Recommending low intensity therapy upon d/c.

## 2025-05-15 NOTE — ASSESSMENT & PLAN NOTE
-Presents s/p near syncopal episode, denies LOC  -Trop elevated but flat  -Orthostatic vitals negative  -Noted to have GRACE and hypotension upon admission-->likely cause of episode  -H/H also dipped to 7.6/23.4 this AM, needs further and transfusion w/u per primary team  -Recent TTE 4/2025 with normal EF; prior Cardiac PET 11/2024 negative for ischemia/scar    5/12/2025  -Stable CV wise  -Orthostatics pending

## 2025-05-15 NOTE — SUBJECTIVE & OBJECTIVE
Review of Systems   Constitutional: Positive for malaise/fatigue.   HENT: Negative.     Eyes: Negative.    Cardiovascular:  Positive for chest pain (atypical).   Respiratory: Negative.     Endocrine: Negative.    Hematologic/Lymphatic: Negative.    Skin: Negative.    Musculoskeletal:  Positive for arthritis.   Gastrointestinal:  Positive for constipation.   Genitourinary: Negative.    Neurological:  Positive for weakness.   Psychiatric/Behavioral: Negative.     Allergic/Immunologic: Negative.      Objective:     Vital Signs (Most Recent):  Temp: 98.3 °F (36.8 °C) (05/15/25 1317)  Pulse: 105 (05/15/25 1317)  Resp: 17 (05/15/25 1317)  BP: (!) 90/54 (05/15/25 1317)  SpO2: 95 % (05/15/25 1317) Vital Signs (24h Range):  Temp:  [97.7 °F (36.5 °C)-98.9 °F (37.2 °C)] 98.3 °F (36.8 °C)  Pulse:  [] 105  Resp:  [17-20] 17  SpO2:  [94 %-97 %] 95 %  BP: ()/(54-87) 90/54     Weight: 62.1 kg (137 lb)  Body mass index is 25.06 kg/m².     SpO2: 95 %         Intake/Output Summary (Last 24 hours) at 5/15/2025 1423  Last data filed at 5/14/2025 1949  Gross per 24 hour   Intake 228.76 ml   Output --   Net 228.76 ml       Lines/Drains/Airways       Peripheral Intravenous Line  Duration                  Peripheral IV - Single Lumen 05/13/25 1545 22 G Anterior;Distal;Right Forearm 1 day                       Physical Exam  Vitals and nursing note reviewed.   Constitutional:       General: She is not in acute distress.     Appearance: Normal appearance. She is well-developed. She is not diaphoretic.   HENT:      Head: Normocephalic and atraumatic.   Eyes:      General:         Right eye: No discharge.         Left eye: No discharge.      Pupils: Pupils are equal, round, and reactive to light.   Neck:      Thyroid: No thyromegaly.   Cardiovascular:      Rate and Rhythm: Normal rate and regular rhythm.      Heart sounds: Normal heart sounds, S1 normal and S2 normal. No murmur heard.     Comments: +Chest wall  "tenderness  Pulmonary:      Effort: Pulmonary effort is normal. No respiratory distress.   Musculoskeletal:      Right lower leg: No edema.      Left lower leg: No edema.   Skin:     General: Skin is warm and dry.      Findings: No erythema.   Neurological:      Mental Status: She is alert and oriented to person, place, and time.   Psychiatric:         Mood and Affect: Mood normal.         Behavior: Behavior normal.            Significant Labs: CMP   Recent Labs   Lab 05/14/25  0510 05/15/25  0443    140   K 4.1 4.7    106   CO2 21* 23   GLU 92 88   BUN 35* 48*   CREATININE 3.1* 3.6*   CALCIUM 8.5* 8.4*   ANIONGAP 13 11   , CBC   Recent Labs   Lab 05/14/25  0510 05/15/25  0443   WBC 4.38 3.97   HGB 9.1* 9.0*   HCT 28.1* 27.7*    206   , Troponin No results for input(s): "TROPONINIHS" in the last 48 hours., and All pertinent lab results from the last 24 hours have been reviewed.    Significant Imaging: Echocardiogram: Transthoracic echo (TTE) complete (Cupid Only):   Results for orders placed or performed during the hospital encounter of 04/24/25   Echo   Result Value Ref Range    LV Diastolic Volume 71 mL    Echo EF Estimated 40 %    LV Systolic Volume 43 mL    IVS 0.9 0.6 - 1.1 cm    LVIDd 4.0 3.5 - 6.0 cm    LVIDs 3.3 2.1 - 4.0 cm    LVOT diameter 2.0 cm    PW 0.9 0.6 - 1.1 cm    AV LVOT peak gradient 3 mmHg    LVOT mn grad 2 mmHg    LVOT peak jacky 0.9 m/s    LVOT peak VTI 20.6 cm    RV- marie basal diam 3.4 cm    AV valve area 4.3 cm2    AV area by cont VTI 4.3 cm2    AV peak gradient 3 mmHg    AV mean gradient 1 mmHg    Ao peak jacky 0.8 m/s    Ao VTI 14.9 cm    MV Peak A Jacky 0.59 m/s    E wave deceleration time 138 ms    MV Peak E Jacky 0.72 m/s    E/A ratio 1.22     LV LATERAL E/E' RATIO 9.0     LV SEPTAL E/E' RATIO 12.0     TDI LATERAL 0.08 m/s    TDI SEPTAL 0.06 m/s    TV peak gradient 26 mmHg    TR Max Jacky 2.5 m/s    Ascending aorta 2.4 cm    STJ 3.1 cm    Sinus 3.11 cm    TAPSE 0.9 cm    BSA " 1.74 m2    LVOT stroke volume 64.7 cm3    LV Systolic Volume Index 25.3 mL/m2    LV Diastolic Volume Index 41.76 mL/m2    LVOT area 3.1 cm2    FS 17.5 (A) 28 - 44 %    Left Ventricle Relative Wall Thickness 0.45 cm    LV mass 109.7 g    LV Mass Index 64.5 g/m2    E/E' ratio 10 m/s    RV/LV Ratio 0.85 cm    AV Velocity Ratio 1.13     AV index (prosthetic) 1.38     PRESLEY by Velocity Ratio 3.5 cm²    Mean e' 0.07 m/s    ZLVIDS 0.95     ZLVIDD -1.67     EF 45 %    TV resting pulmonary artery pressure 28 mmHg    RV TB RVSP 6 mmHg    Est. RA pres 3 mmHg    Narrative      S/P heart transplant 1993.    Left Ventricle: The left ventricle is normal in size. Normal wall   thickness. There is concentric remodeling. There is mildly reduced   systolic function. Ejection fraction is approximately 45%. Mild global   hypokinesis present. Contractility is better preserved in the basal   segements. There is normal diastolic function.    Right Ventricle: The right ventricle is normal in size. Systolic   function is moderately reduced. TAPSE is 0.9 cm.    Tricuspid Valve: There is mild to moderate regurgitation.    Pulmonary Artery: The estimated pulmonary artery systolic pressure is   28 mmHg.    IVC/SVC: Normal venous pressure at 3 mmHg.      and EKG: Reviewed

## 2025-05-15 NOTE — ASSESSMENT & PLAN NOTE
-Atypical, MSK in nature likely due to recent CPR    5/15/205  -Patient with CP/hypotension after working with PT/OT  -Repeat labs and EKG pending  -Being transferred to [facility]

## 2025-05-16 VITALS
OXYGEN SATURATION: 99 % | SYSTOLIC BLOOD PRESSURE: 134 MMHG | HEIGHT: 62 IN | BODY MASS INDEX: 25.19 KG/M2 | TEMPERATURE: 98 F | HEART RATE: 94 BPM | WEIGHT: 136.88 LBS | DIASTOLIC BLOOD PRESSURE: 71 MMHG | RESPIRATION RATE: 18 BRPM

## 2025-05-16 PROBLEM — E43 SEVERE PROTEIN-CALORIE MALNUTRITION: Status: ACTIVE | Noted: 2025-05-16

## 2025-05-16 LAB
ABSOLUTE EOSINOPHIL (OHS): 0.1 K/UL
ABSOLUTE MONOCYTE (OHS): 0.5 K/UL (ref 0.3–1)
ABSOLUTE NEUTROPHIL COUNT (OHS): 2.24 K/UL (ref 1.8–7.7)
ANION GAP (OHS): 10 MMOL/L (ref 8–16)
BASOPHILS # BLD AUTO: 0.01 K/UL
BASOPHILS NFR BLD AUTO: 0.3 %
BUN SERPL-MCNC: 51 MG/DL (ref 8–23)
CALCIUM SERPL-MCNC: 8.5 MG/DL (ref 8.7–10.5)
CHLORIDE SERPL-SCNC: 107 MMOL/L (ref 95–110)
CO2 SERPL-SCNC: 24 MMOL/L (ref 23–29)
CREAT SERPL-MCNC: 3.4 MG/DL (ref 0.5–1.4)
ERYTHROCYTE [DISTWIDTH] IN BLOOD BY AUTOMATED COUNT: 16 % (ref 11.5–14.5)
GFR SERPLBLD CREATININE-BSD FMLA CKD-EPI: 14 ML/MIN/1.73/M2
GLUCOSE SERPL-MCNC: 82 MG/DL (ref 70–110)
HCT VFR BLD AUTO: 27 % (ref 37–48.5)
HGB BLD-MCNC: 8.7 GM/DL (ref 12–16)
IMM GRANULOCYTES # BLD AUTO: 0.01 K/UL (ref 0–0.04)
IMM GRANULOCYTES NFR BLD AUTO: 0.3 % (ref 0–0.5)
LYMPHOCYTES # BLD AUTO: 1.01 K/UL (ref 1–4.8)
MCH RBC QN AUTO: 28.1 PG (ref 27–31)
MCHC RBC AUTO-ENTMCNC: 32.2 G/DL (ref 32–36)
MCV RBC AUTO: 87 FL (ref 82–98)
NUCLEATED RBC (/100WBC) (OHS): 0 /100 WBC
PLATELET # BLD AUTO: 194 K/UL (ref 150–450)
PMV BLD AUTO: 9.4 FL (ref 9.2–12.9)
POTASSIUM SERPL-SCNC: 4.2 MMOL/L (ref 3.5–5.1)
RBC # BLD AUTO: 3.1 M/UL (ref 4–5.4)
RELATIVE EOSINOPHIL (OHS): 2.6 %
RELATIVE LYMPHOCYTE (OHS): 26.1 % (ref 18–48)
RELATIVE MONOCYTE (OHS): 12.9 % (ref 4–15)
RELATIVE NEUTROPHIL (OHS): 57.8 % (ref 38–73)
SODIUM SERPL-SCNC: 141 MMOL/L (ref 136–145)
WBC # BLD AUTO: 3.87 K/UL (ref 3.9–12.7)

## 2025-05-16 PROCEDURE — 36415 COLL VENOUS BLD VENIPUNCTURE: CPT | Mod: HCNC | Performed by: STUDENT IN AN ORGANIZED HEALTH CARE EDUCATION/TRAINING PROGRAM

## 2025-05-16 PROCEDURE — 63600175 PHARM REV CODE 636 W HCPCS: Mod: HCNC | Performed by: STUDENT IN AN ORGANIZED HEALTH CARE EDUCATION/TRAINING PROGRAM

## 2025-05-16 PROCEDURE — 25000003 PHARM REV CODE 250: Mod: HCNC | Performed by: NURSE PRACTITIONER

## 2025-05-16 PROCEDURE — 97116 GAIT TRAINING THERAPY: CPT | Mod: HCNC

## 2025-05-16 PROCEDURE — 80048 BASIC METABOLIC PNL TOTAL CA: CPT | Mod: HCNC | Performed by: STUDENT IN AN ORGANIZED HEALTH CARE EDUCATION/TRAINING PROGRAM

## 2025-05-16 PROCEDURE — 99233 SBSQ HOSP IP/OBS HIGH 50: CPT | Mod: HCNC,,, | Performed by: INTERNAL MEDICINE

## 2025-05-16 PROCEDURE — 21400001 HC TELEMETRY ROOM: Mod: HCNC

## 2025-05-16 PROCEDURE — 25000242 PHARM REV CODE 250 ALT 637 W/ HCPCS: Mod: HCNC | Performed by: STUDENT IN AN ORGANIZED HEALTH CARE EDUCATION/TRAINING PROGRAM

## 2025-05-16 PROCEDURE — 85025 COMPLETE CBC W/AUTO DIFF WBC: CPT | Mod: HCNC | Performed by: STUDENT IN AN ORGANIZED HEALTH CARE EDUCATION/TRAINING PROGRAM

## 2025-05-16 PROCEDURE — 63600175 PHARM REV CODE 636 W HCPCS: Mod: HCNC | Performed by: NURSE PRACTITIONER

## 2025-05-16 RX ADMIN — CARVEDILOL 3.12 MG: 3.12 TABLET, FILM COATED ORAL at 08:05

## 2025-05-16 RX ADMIN — NITROGLYCERIN 0.4 MG: 0.4 TABLET SUBLINGUAL at 09:05

## 2025-05-16 RX ADMIN — HEPARIN SODIUM 5000 UNITS: 5000 INJECTION INTRAVENOUS; SUBCUTANEOUS at 10:05

## 2025-05-16 RX ADMIN — PREGABALIN 50 MG: 25 CAPSULE ORAL at 08:05

## 2025-05-16 RX ADMIN — ASPIRIN 81 MG: 81 TABLET, COATED ORAL at 08:05

## 2025-05-16 RX ADMIN — CYCLOSPORINE 50 MG: 25 CAPSULE, LIQUID FILLED ORAL at 08:05

## 2025-05-16 RX ADMIN — TIZANIDINE 4 MG: 4 TABLET ORAL at 04:05

## 2025-05-16 RX ADMIN — CARVEDILOL 3.12 MG: 3.12 TABLET, FILM COATED ORAL at 04:05

## 2025-05-16 RX ADMIN — NITROGLYCERIN 0.4 MG: 0.4 TABLET SUBLINGUAL at 04:05

## 2025-05-16 RX ADMIN — NIFEDIPINE 60 MG: 60 TABLET, FILM COATED, EXTENDED RELEASE ORAL at 08:05

## 2025-05-16 RX ADMIN — TIZANIDINE 4 MG: 4 TABLET ORAL at 12:05

## 2025-05-16 RX ADMIN — SODIUM BICARBONATE 650 MG: 650 TABLET ORAL at 08:05

## 2025-05-16 RX ADMIN — ACETAMINOPHEN 650 MG: 325 TABLET ORAL at 06:05

## 2025-05-16 RX ADMIN — PANTOPRAZOLE SODIUM 40 MG: 40 TABLET, DELAYED RELEASE ORAL at 08:05

## 2025-05-16 RX ADMIN — SERTRALINE HYDROCHLORIDE 25 MG: 25 TABLET ORAL at 08:05

## 2025-05-16 RX ADMIN — CYCLOSPORINE 50 MG: 25 CAPSULE, LIQUID FILLED ORAL at 05:05

## 2025-05-16 RX ADMIN — HEPARIN SODIUM 5000 UNITS: 5000 INJECTION INTRAVENOUS; SUBCUTANEOUS at 06:05

## 2025-05-16 RX ADMIN — ACETAMINOPHEN 650 MG: 325 TABLET ORAL at 04:05

## 2025-05-16 RX ADMIN — HEPARIN SODIUM 5000 UNITS: 5000 INJECTION INTRAVENOUS; SUBCUTANEOUS at 02:05

## 2025-05-16 NOTE — PT/OT/SLP PROGRESS
Physical Therapy Treatment    Patient Name:  Nadia Damon   MRN:  [id number]    Recommendations:     Discharge Recommendations: Low Intensity Therapy  Discharge Equipment Recommendations: none  Barriers to discharge: None    Assessment:     Nadia Damon is a 69 y.o. female admitted with a medical diagnosis of Syncope.  She presents with the following impairments/functional limitations: weakness, impaired endurance, impaired functional mobility, gait instability, impaired balance, pain, decreased safety awareness, decreased lower extremity function, impaired cardiopulmonary response to activity, decreased upper extremity function.    Rehab Prognosis: Good; patient would benefit from acute skilled PT services to address these deficits and reach maximum level of function.    Recent Surgery: * No surgery found *      Plan:     During this hospitalization, patient to be seen 3 x/week to address the identified rehab impairments via gait training, therapeutic activities, therapeutic exercises and progress toward the following goals:    Plan of Care Expires:  05/29/25    Subjective     Chief Complaint: Pt is motivated to participate, reports chest pain this AM treated with nitroglycerin, no chest pain reported during session.   Patient/Family Comments/goals: none stated  Pain/Comfort:  Pain Rating 1:  (reports continued soreness from chest compressions)      Objective:     Communicated with epic chart review prior to session.  Patient found up in chair with chair check, peripheral IV, telemetry upon PT entry to room.     General Precautions: Standard, fall  Orthopedic Precautions: N/A  Braces: N/A  Respiratory Status: Room air     Functional Mobility:  Gait Belt Applied - Yes   Socks/Shoes Donned - Yes  Bed Mobility  Seated in chair at start of session and returned to chair  Transfers  Sit to Stand: stand by assistance with rolling walker  Gait  Patient ambulated 60ft x2 with rolling walker and stand by assistance.  "Patient demonstrates occasional unsteady gait. No c/o dizziness, mild SOB, educated about pursed lip breathing technique and cued for use with mobility, no c/o chest pain, no gross LOB, standing rest taken. All lines remained intact throughout ambulation trial. Increased time needed  Balance  Sitting: stand by assistance  Standing: stand by assistance    AM-PAC 6 CLICK MOBILITY  Turning over in bed (including adjusting bedclothes, sheets and blankets)?: 3  Sitting down on and standing up from a chair with arms (e.g., wheelchair, bedside commode, etc.): 3  Moving from lying on back to sitting on the side of the bed?: 3  Moving to and from a bed to a chair (including a wheelchair)?: 3  Need to walk in hospital room?: 3  Climbing 3-5 steps with a railing?: 1 (NT)  Basic Mobility Total Score: 16     Treatment & Education:  Reviewed role of PT in acute care and POC. Pt tolerated interventions well. Reviewed importance of OOB activities, activity pacing, and HEP (marching/hip flex, hip abd, heel slides/LAQ, quad sets, ankle pumps) in order to maintain/regain strength. Encouraged to sit up in chair for all meals. Reviewed proper use of RW for safety and to reduce risk of falling. Reviewed "call don't fall" policy and increased risk of falling due to weakness, instructed to utilize call bell for assistance with all transfers. Pt agreeable to all requests.    Patient left up in chair with all lines intact, call button in reach, and chair alarm on..    GOALS:   Multidisciplinary Problems       Physical Therapy Goals          Problem: Physical Therapy    Goal Priority Disciplines Outcome Interventions   Physical Therapy Goal     PT, PT/OT Progressing    Description: Goals to be met by 5/29/25.  1. Pt will complete bed mobility MOD I.  2. Pt will complete sit to stand MOD I.  3. Pt will ambulate 200ft MOD I using RW.  4. Pt will increase AMPAC score by 2 points to progress functional mobility.                       DME " Justifications:  No DME recommended requiring DME justifications    Time Tracking:     PT Received On: 05/16/25  PT Start Time: 1110     PT Stop Time: 1125  PT Total Time (min): 15 min     Billable Minutes: Gait Training 15min    Treatment Type: Treatment  PT/PTA: PT     Number of PTA visits since last PT visit: 0     05/16/2025

## 2025-05-16 NOTE — ASSESSMENT & PLAN NOTE
Stable, enzymes flattened.   05/15/2025  -Stat troponin during episode of CP lower than previous lab draw  -CP improved to 5/10 severity after nitro SL x 2 doses  -Transfer to [facility] initiated  -Transplant team to see as consult

## 2025-05-16 NOTE — PROGRESS NOTES
[facility] - Telemetry ([facility])  Adult Nutrition  Progress Note    SUMMARY       Recommendations    Recommendation/Intervention:   1. Recommend a Renal non dialysis, Heart Healthy diet with fluid restrictions per MD/NP.  2. Recommend Suplena BID to assist filling nutritional gaps   3. Recommend starting a bowel regimen.  4. Encourage PO and supplement intake, recommend feeding assistance as warranted.   5. Weigh twice weekly    Goals:   1. Pt will tolerate and consume >75% EEN and EPN prior to RD follow up 2. Pt has a BM prior to RD follow up.     Nutrition Goal Status: progressing towards goal, resolved  Communication of RD Recs: other (comment) (RD progress note, POC, sticky note)    Nutrition Discharge Planning    Nutrition Discharge Planning: Therapeutic diet (comments), Oral supplement regimen (comments)  Therapeutic diet (comments): Heart Healthy, Renal on dialysis  Oral supplement regimen (comments): a daily renal multivitamin, Suplena as warranted    Assessment and Plan    Endocrine  Severe protein-calorie malnutrition  Malnutrition Type:  Context: chronic illness  Level: severe    Related to (etiology):   Physiological causes increasing needs dt chronic illness  Alteration in GI tract function    Signs and Symptoms (as evidenced by):   Unintentional weight loss of >10% in 6 mo  Unable or unwilling to eat sufficient energy/protein to maintain a healthy weight  Food avoidance and/or lack of interest in food    Malnutrition Characteristic Summary:  Weight Loss (Malnutrition): greater than 10% in 6 months  Energy Intake (Malnutrition): less than or equal to 75% for greater than or equal to 1 month  Fluid Accumulation (Malnutrition): mild      Interventions/Recommendations (treatment strategy):  1. Low fat, mineral modified diet.  2. Commercial beverage medical food supplement therapy.  3. Management of nutrition related prescription medication.  4. Collaboration by nutrition professional with other  providers.    Nutrition Diagnosis Status:   New         Malnutrition Assessment 5/16/25:  Malnutrition Context: chronic illness  Malnutrition Level: severe  Skin (Micronutrient): bruised, edema (+2 edema, Gabe score 20)       Weight Loss (Malnutrition): greater than 10% in 6 months  Energy Intake (Malnutrition): less than or equal to 75% for greater than or equal to 1 month  Fluid Accumulation (Malnutrition): mild   Orbital Region (Subcutaneous Fat Loss): well nourished  Upper Arm Region (Subcutaneous Fat Loss): well nourished  Thoracic and Lumbar Region: well nourished   Advent Region (Muscle Loss): well nourished  Clavicle Bone Region (Muscle Loss): well nourished  Clavicle and Acromion Bone Region (Muscle Loss): well nourished  Patellar Region (Muscle Loss): well nourished  Anterior Thigh Region (Muscle Loss): well nourished  Posterior Calf Region (Muscle Loss): well nourished                 Reason for Assessment    Reason For Assessment: RD follow-up  Diagnosis: other (see comments) (Syncope)    General Information Comments:   5/13/25: 69 y.o. Female admitted for Syncope. PMH: Fibromyalgia, GERD, HTN, Chest pain, CAD, MDD, GRACE on CKD 4, Elevated troponin, Normocytic anemia; Hx: S/p orthotopic heart transplant. Pt is currently on a Heart healthy, 2000 mL fluid restriction diet. RD assessing pt d/t identified at risk from MST. Per EMR syncope likely d/t dehydration vs medication side effect, negative for GI issues. RD visited pt at bedside, pt was sleeping, provided pt with a menu to help encourage pt preferred food choices. Note RD attached nutrition education to pt's d/c papers. Per chart: 25-50% PO intake; LBM 5/10; 1+ trace/ 2+ mild edema. RD added Suplena to pt's orders and labs. Labs, meds, weights reviewed. Weight charted 4/10/25 145 lbs, 5/12/25 137 lbs (BMI 25.1, Normal for age, -8 lb wt loss (6% wt change) x 1 month, significant wt loss, will per from full NFPE at follow up. Note weight charted  "25 162 lbs, +25 lb wt gain x 1 day, reweigh for accuracy warranted. RD will continue to follow and monitor pt's nutritional status during admit.    Follow up:    25: Pt seen for follow up. Pt is currently getting a Heart healthy diet with Suplena BID. RD spoke w/ pt at bedside. Pt reported to have a fair appetite, good meal intakes, denied NVD, no chewing or swallowing issues but reported to have constipation and abd pain which has improved somewhat. Pt reported to RD that she usually follows a Renal diet at home eating 1-2 meals/day and drinking a Boost Glucose 1-2 times a day, UBW of 72.7 kg, CBW 62.1 kg . If pt's stated UBW is correct pt has lost 10.6 kg in 6 mo which is a 15% weight loss and deemed significant. RD performed a visual NFPE which shown no muscle or fat wasting. RD received pt's food preferences and will continue to send Suplena BID. Labs and meds reviewed.    Nutrition/Diet History    Nutrition Intake History: 1-2 meals/day, renal on dialysis diet at home, Novasource or Boost Glucose at home  Spiritual, Cultural Beliefs, Spiritism Practices, Values that Affect Care: no  Food Allergies: NKFA  Factors Affecting Nutritional Intake: abdominal pain, altered taste, constipation, decreased appetite  Nutrition Related Social Determinants of Health: SDOH: Adequate food in home environment and None Identified      Anthropometrics    Height: 5' 2" (157.5 cm)  Height (inches): 62 in  Height Method: Stated  Weight: 62.1 kg (136 lb 14.5 oz)  Weight (lb): 136.91 lb  Weight Method: Bed Scale  Ideal Body Weight (IBW), Female: 110 lb  % Ideal Body Weight, Female (lb): 124.46 %  BMI (Calculated): 25  BMI Grade: 25 - 29.9 - overweight  Weight Loss: unintentional  Usual Body Weight (UBW), k.7 kg  Weight Change Amount: 8 lb  % Usual Body Weight: 85.6  % Weight Change From Usual Weight: -14.58 %       Wt Readings from Last 15 Encounters:   25 62.1 kg (136 lb 14.5 oz)   25 62.1 kg (137 lb " "0.3 oz)   05/07/25 59.4 kg (131 lb)   05/05/25 59.5 kg (131 lb 2.8 oz)   04/24/25 66.2 kg (145 lb 15.1 oz)   04/23/25 66.3 kg (146 lb 0.9 oz)   04/23/25 66.3 kg (146 lb 0.9 oz)   04/10/25 66 kg (145 lb 8.1 oz)   04/09/25 65.1 kg (143 lb 8.3 oz)   04/02/25 65 kg (143 lb 4.8 oz)   03/26/25 66.3 kg (146 lb 4.4 oz)   03/19/25 66.3 kg (146 lb 4.4 oz)   03/12/25 68.2 kg (150 lb 5.7 oz)   03/07/25 68.2 kg (150 lb 5.7 oz)   03/04/25 68.2 kg (150 lb 7.4 oz)       Lab/Procedures/Meds    Pertinent Labs Reviewed: reviewed  Pertinent Medications Reviewed: reviewed    BMP  Lab Results   Component Value Date     05/16/2025    K 4.2 05/16/2025     05/16/2025    CO2 24 05/16/2025    BUN 51 (H) 05/16/2025    CREATININE 3.4 (H) 05/16/2025    CALCIUM 8.5 (L) 05/16/2025    ANIONGAP 10 05/16/2025    EGFRNORACEVR 14 (L) 05/16/2025     Lab Results   Component Value Date    CALCIUM 8.5 (L) 05/16/2025    PHOS 2.9 05/06/2025     Lab Results   Component Value Date    ALBUMIN 3.5 05/12/2025     Lab Results   Component Value Date    ALT 28 05/12/2025    AST 36 05/12/2025    ALKPHOS 195 (H) 05/12/2025    BILITOT 0.4 05/12/2025     No results for input(s): "POCTGLUCOSE" in the last 24 hours.    Lab Results   Component Value Date    HGBA1C 5.1 07/02/2024     Lab Results   Component Value Date    WBC 3.87 (L) 05/16/2025    HGB 8.7 (L) 05/16/2025    HCT 27.0 (L) 05/16/2025    MCV 87 05/16/2025     05/16/2025       Scheduled Meds:   aspirin  81 mg Oral Daily    carvediloL  3.125 mg Oral BID WM    cycloSPORINE modified (NEORAL)  50 mg Oral BID    heparin (porcine)  5,000 Units Subcutaneous Q8H    NIFEdipine  60 mg Oral Daily    pantoprazole  40 mg Oral Daily    pregabalin  50 mg Oral QHS    sertraline  25 mg Oral Daily    sodium bicarbonate  650 mg Oral BID     Continuous Infusions:  PRN Meds:.  Current Facility-Administered Medications:     0.9%  NaCl infusion (for blood administration), , Intravenous, Q24H PRN    acetaminophen, 650 " mg, Rectal, Q6H PRN    acetaminophen, 650 mg, Oral, Q6H PRN    aluminum-magnesium hydroxide-simethicone, 30 mL, Oral, QID PRN    dextrose 50%, 12.5 g, Intravenous, PRN    dextrose 50%, 25 g, Intravenous, PRN    glucagon (human recombinant), 1 mg, Intramuscular, PRN    glucose, 16 g, Oral, PRN    glucose, 24 g, Oral, PRN    hydrALAZINE, 10 mg, Intravenous, Q8H PRN    naloxone, 0.02 mg, Intravenous, PRN    nitroGLYCERIN, 0.4 mg, Sublingual, Q5 Min PRN    ondansetron, 4 mg, Intravenous, Q8H PRN    polyethylene glycol, 17 g, Oral, Daily PRN    promethazine, 25 mg, Oral, Q6H PRN    simethicone, 1 tablet, Oral, QID PRN    sodium chloride 0.9%, 3 mL, Intravenous, Q12H PRN    tiZANidine, 4 mg, Oral, BID PRN    zolpidem, 5 mg, Oral, Nightly PRN      Estimated/Assessed Needs    Weight Used For Calorie Calculations: 62.1 kg (136 lb 14.5 oz)  Energy Calorie Requirements (kcal): 7188-6746 kcals (20-30 kcals/kg ABW (GRACE)  Energy Need Method: Kcal/kg  Protein Requirements: 49-62 g (0.8-1.0 g/kg ABW (GRACE, no dialysis)  Weight Used For Protein Calculations: 62.1 kg (136 lb 14.5 oz)  Fluid Requirements (mL): 2000 mL fluid restriction (Per MD/NP)  Estimated Fluid Requirement Method: other (see comments)  RDA Method (mL): 1242  CHO Requirement: 155-233 g (1276-7591 kcals/8)      Nutrition Prescription Ordered    Current Diet Order: Heart Healthy  Oral Nutrition Supplement: Suplena BID    Evaluation of Received Nutrient/Fluid Intake  I/O: (Net since admit):  5/13/25: -2150 mL  5/16/25: -2420.3 ml    Energy Calories Required: meeting needs  Protein Required: meeting needs  Fluid Required: meeting needs  Total Fluid Intake (mL): 480  Comments: LBM: 5/15  Tolerance: tolerating  % Intake of Estimated Energy Needs: 50 - 75 %  % Meal Intake: 50 - 75 %    PES Statement      Nutrition Risk    Level of Risk/Frequency of Follow-up: low     Monitor and Evaluation    Monitor and Evaluation: Energy intake, Food and beverage intake, Protein intake,  Carbohydrate intake, Diet order, Weight, Electrolyte and renal panel, Gastrointestinal profile, Glucose/endocrine profile, Nutrition focused physical findings, Skin     Nutrition Follow-Up    RD Follow-up?: Yes (Follow up x1 weekly)    Isi Barker RDN, [place]

## 2025-05-16 NOTE — PROGRESS NOTES
[facility] Tele[facility] ([facility])  Cardiology  Progress Note    Patient Name: Nadia Damon  MRN: [id number]  Admission Date: 5/12/2025  Hospital Length of Stay: 4 days  Code Status: Partial Code   Attending Physician: Ra Rowe MD   Primary Care Physician: Elis Wick MD  Expected Discharge Date: 5/16/2025  Principal Problem:Syncope    Subjective:   HPI:  Ms. Damon is a 69 year old female patient whose current medical conditions include anemia, leukopenia, breast CA s/p excision and chemo/radiation, cervical spine DJD, and remote history of OHT in 5/1993 with PPM placed at same time who presented to [facility] ED yesterday from primary care clinic due to a near syncopal episode. Patient became lightheaded and dizzy yesterday after her appointment while waiting for her Lyft and eventually fell to the ground. The  at the clinic was able to abort the fall, and patient did not suffer any injury but she continued to feel lethargic and fatigued. Her BP was taken and she was found to be notably hypotensive (90/52 and ? 80/40) and thus EMS was called to transport her to the emergency room. Initial labs revealed troponin of 0.139, GRACE (creatinine of 3.9), BNP of 461, and mild anemia (9.5/29.2), and patient subsequently admitted for further evaluation and treatment. Cardiology consulted to assist with management. Patient seen and examined today, resting in bed. Feels ok. Still weak/tired. Reports she was just recently admitted and discharged from [facility] on 5/6/2025. Suffered a brief cardiac arrest during that admission (rhythm unknown, required 1 round of CPR and 1 round of epi). LHC was deferred at that time given patient's renal function and prior negative Cardiac PET in 11/2024. Patient endorses having ongoing issues with MSK chest wall/soreness since that time. Labs reviewed. Troponin elevated but flat, 0.139>0.095>0.106. EKG without acute ischemic changes. TTE 4/2025 with normal EF.  "Creatinine improved to 3.5. Orthostatic vitals negative upon admission. H/H dipped to 7.6/23.4 this AM, consider transfusion.     Hospital Course:   5/14/2025-Patient seen and examined today, eating lunch. Feeling much better. Less fatigued. Still has some chest tenderness/soreness. Labs reviewed. Creatinine 3.1. H/H improved post transfusion. Orthostatics pending.    5/15/2025-Patient seen and examined today, sitting up in bedside chair. Reported chest "constriction"/soreness after straining to have a BM, improved with muscle relaxant. Labs reviewed. Creatinine 3.6. H/H 9.0/27.7. Apparently after we saw patient, she became hypotension and experienced CP 10/10 CP after working with PT/OT. Sublingual nitro given with improvement, being transferred to [facility].    5/16/2025-Patient seen and examined today, sitting up in bedside chair. Feels ok. Reports some recurrent CP this AM, given sublingual nitro with relief. Awaiting transfer to [facility]. Labs reviewed, trop trending /down. H/H 8.7/27.0. Creatinine 3.4.         Review of Systems   Constitutional: Positive for malaise/fatigue.   HENT: Negative.     Eyes: Negative.    Cardiovascular:  Positive for chest pain.   Respiratory: Negative.     Endocrine: Negative.    Hematologic/Lymphatic: Negative.    Skin: Negative.    Musculoskeletal: Negative.    Gastrointestinal: Negative.    Genitourinary: Negative.    Neurological:  Positive for dizziness, light-headedness and weakness.   Psychiatric/Behavioral: Negative.     Allergic/Immunologic: Negative.      Objective:     Vital Signs (Most Recent):  Temp: 98 °F (36.7 °C) (05/16/25 1147)  Pulse: 99 (05/16/25 1147)  Resp: 19 (05/16/25 1147)  BP: 136/81 (05/16/25 1147)  SpO2: 98 % (05/16/25 1147) Vital Signs (24h Range):  Temp:  [98 °F (36.7 °C)-98.9 °F (37.2 °C)] 98 °F (36.7 °C)  Pulse:  [] 99  Resp:  [18-20] 19  SpO2:  [94 %-98 %] 98 %  BP: (119-145)/(68-89) 136/81     Weight: 62.1 kg (137 lb)  Body mass index is " "25.06 kg/m².     SpO2: 98 %         Intake/Output Summary (Last 24 hours) at 5/16/2025 1337  Last data filed at 5/16/2025 0500  Gross per 24 hour   Intake 480 ml   Output --   Net 480 ml       Lines/Drains/Airways       Peripheral Intravenous Line  Duration                  Peripheral IV - Single Lumen 05/13/25 1545 22 G Anterior;Distal;Right Forearm 2 days                       Physical Exam  Vitals and nursing note reviewed.   Constitutional:       Appearance: Normal appearance.   HENT:      Head: Normocephalic and atraumatic.   Eyes:      Pupils: Pupils are equal, round, and reactive to light.   Cardiovascular:      Rate and Rhythm: Normal rate and regular rhythm.      Heart sounds: S1 normal and S2 normal.   Pulmonary:      Effort: Pulmonary effort is normal.   Musculoskeletal:      Right lower leg: No edema.      Left lower leg: No edema.   Neurological:      General: No focal deficit present.      Mental Status: She is oriented to person, place, and time.   Psychiatric:      Comments: Anxious              Significant Labs: CMP   Recent Labs   Lab 05/15/25  0443 05/16/25  0504    141   K 4.7 4.2    107   CO2 23 24   GLU 88 82   BUN 48* 51*   CREATININE 3.6* 3.4*   CALCIUM 8.4* 8.5*   ANIONGAP 11 10   , CBC   Recent Labs   Lab 05/15/25  0443 05/16/25  0504   WBC 3.97 3.87*   HGB 9.0* 8.7*   HCT 27.7* 27.0*    194   , Troponin No results for input(s): "TROPONINIHS" in the last 48 hours., and All pertinent lab results from the last 24 hours have been reviewed.    Significant Imaging: Echocardiogram: Transthoracic echo (TTE) complete (Cupid Only):   Results for orders placed or performed during the hospital encounter of 04/24/25   Echo   Result Value Ref Range    LV Diastolic Volume 71 mL    Echo EF Estimated 40 %    LV Systolic Volume 43 mL    IVS 0.9 0.6 - 1.1 cm    LVIDd 4.0 3.5 - 6.0 cm    LVIDs 3.3 2.1 - 4.0 cm    LVOT diameter 2.0 cm    PW 0.9 0.6 - 1.1 cm    AV LVOT peak gradient 3 mmHg    " LVOT mn grad 2 mmHg    LVOT peak jacky 0.9 m/s    LVOT peak VTI 20.6 cm    RV- marie basal diam 3.4 cm    AV valve area 4.3 cm2    AV area by cont VTI 4.3 cm2    AV peak gradient 3 mmHg    AV mean gradient 1 mmHg    Ao peak jacky 0.8 m/s    Ao VTI 14.9 cm    MV Peak A Jacky 0.59 m/s    E wave deceleration time 138 ms    MV Peak E Jacky 0.72 m/s    E/A ratio 1.22     LV LATERAL E/E' RATIO 9.0     LV SEPTAL E/E' RATIO 12.0     TDI LATERAL 0.08 m/s    TDI SEPTAL 0.06 m/s    TV peak gradient 26 mmHg    TR Max Jacky 2.5 m/s    Ascending aorta 2.4 cm    STJ 3.1 cm    Sinus 3.11 cm    TAPSE 0.9 cm    BSA 1.74 m2    LVOT stroke volume 64.7 cm3    LV Systolic Volume Index 25.3 mL/m2    LV Diastolic Volume Index 41.76 mL/m2    LVOT area 3.1 cm2    FS 17.5 (A) 28 - 44 %    Left Ventricle Relative Wall Thickness 0.45 cm    LV mass 109.7 g    LV Mass Index 64.5 g/m2    E/E' ratio 10 m/s    RV/LV Ratio 0.85 cm    AV Velocity Ratio 1.13     AV index (prosthetic) 1.38     PRESLEY by Velocity Ratio 3.5 cm²    Mean e' 0.07 m/s    ZLVIDS 0.95     ZLVIDD -1.67     EF 45 %    TV resting pulmonary artery pressure 28 mmHg    RV TB RVSP 6 mmHg    Est. RA pres 3 mmHg    Narrative      S/P heart transplant 1993.    Left Ventricle: The left ventricle is normal in size. Normal wall   thickness. There is concentric remodeling. There is mildly reduced   systolic function. Ejection fraction is approximately 45%. Mild global   hypokinesis present. Contractility is better preserved in the basal   segements. There is normal diastolic function.    Right Ventricle: The right ventricle is normal in size. Systolic   function is moderately reduced. TAPSE is 0.9 cm.    Tricuspid Valve: There is mild to moderate regurgitation.    Pulmonary Artery: The estimated pulmonary artery systolic pressure is   28 mmHg.    IVC/SVC: Normal venous pressure at 3 mmHg.     , EKG: Reviewed, and X-Ray: CXR: X-Ray Chest 1 View (CXR): No results found for this visit on 05/12/25. and X-Ray  Chest PA and Lateral (CXR): No results found for this visit on 05/12/25.  Assessment and Plan:   Patient who presents with s/p near syncopal episode likely due to hypotension/GRACE/anemia. Continues to have recurrent CP episodes/also has marked chest wall tenderness on exam. Awaiting transfer to [facility].    * Syncope  -Presents s/p near syncopal episode, denies LOC  -Trop elevated but flat  -Orthostatic vitals negative  -Noted to have GRACE and hypotension upon admission-->likely cause of episode  -H/H also dipped to 7.6/23.4 this AM, needs further and transfusion w/u per primary team  -Recent TTE 4/2025 with normal EF; prior Cardiac PET 11/2024 negative for ischemia/scar    5/12/2025  -Stable CV wise  -Orthostatics pending    Normocytic anemia  -H/H dipped to 7.6/23.4, consider transfusion  -Mgmt per primary team      5/14/2025  -Improved post transfusion    Elevated troponin  -Troponin 0.139>0.095>0.106  -Elevation likely secondary to demand ischemia from GRACE, hypotension, anemia  -Recent TTE 4/2025 with normal EF  -Prior Cardiac PET 11/2024 negative for ischemia/scar  -CP on exam is atypical and reproducible  -Continue OMT as tolerated    5/15/2025  -Recurrent CP after working with PT/OT; marked tenderness on exam  -Troponin and EKG pending  -Being transferred to [facility]    5/16/2025  -Trop yesterday trending down  -Continue CCB, Nifedipine, ASA  -Awaiting transfer    Acute kidney injury superimposed on stage 4 chronic kidney disease  -Improved with IVF  -Mgmt per primary team    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  -Continue OMT as tolerated    Chest pain  -Atypical, MSK in nature likely due to recent CPR    5/15/205  -Patient with CP/hypotension after working with PT/OT  -Repeat labs and EKG pending  -Being transferred to [facility]    5/16/2025  -Recurrent episode this AM, relieved with nitro  -EKG reviewed, no acute changes  -Trop trending down  -Continue OMT,  awaiting transfer to [facility]    Essential hypertension  -Resume home regimen as tolerated        VTE Risk Mitigation (From admission, onward)           Ordered     heparin (porcine) injection 5,000 Units  Every 8 hours         05/12/25 2146     IP VTE HIGH RISK PATIENT  Once         05/12/25 2146     Place sequential compression device  Until discontinued         05/12/25 2146                    Nilda Rehman PA-C  Cardiology  [facility] Tele[facility] ([facility])

## 2025-05-16 NOTE — ASSESSMENT & PLAN NOTE
GRACE is likely due to pre-renal azotemia due to intravascular volume depletion. Baseline creatinine is 2.9. Most recent creatinine and eGFR are listed below.  Recent Labs     05/13/25  0442 05/14/25  0510 05/15/25  0443   CREATININE 3.5* 3.1* 3.6*   EGFRNORACEVR 14* 16* 13*     05/15/2025  - oscillating around baseline  - aggressive diuresis halted today  - continue to trend labs upon transfer to [facility]

## 2025-05-16 NOTE — PROGRESS NOTES
[facility] Medicine  Progress Note    Patient Name: Nadia Damon  MRN: [id number]  Patient Class: IP- Inpatient   Admission Date: 5/12/2025  Length of Stay: 3 days  Attending Physician: Ra Rowe MD  Primary Care Provider: Elis Wick MD        Subjective     Principal Problem:Syncope        HPI:  Nadia Damon is a 69 y.o. female with a PMH  has a past medical history of Abdominal wall hernia, Abnormal mammogram (10/12/2021), Acute cystitis without hematuria (05/10/2022), Acute ischemic right middle cerebral artery (MCA) stroke (07/20/2024), Adverse drug reaction (11/12/2024), GRACE (acute kidney injury) (11/22/2021), Anxiety, Aphasia (07/19/2024), Arthritis, Breast cancer in female (08/2021), Breast mass, right (11/13/2024), Bronchitis (08/18/2016), C. difficile colitis (11/29/2021), Cellulitis of axilla, left (12/23/2021), Chronic diastolic heart failure (12/16/2021), Chronic kidney disease, Chronic midline low back pain without sciatica (06/18/2018), CKD (chronic kidney disease) stage 4, GFR 15-29 ml/min (04/20/2016), Closed nondisplaced fracture of distal phalanx of left great toe with routine healing (10/22/2018), Coronary artery disease (1993), COVID-19 in immunocompromised patient (02/26/2024), Cystitis (05/10/2022), Depression, Encounter for blood transfusion, Encounter for palliative care (10/14/2024), Fibromyalgia, Heart failure, Heart transplanted (1993), History of hyperparathyroidism; Hyperparathyroidism, secondary renal, Hypertension, Immune disorder, 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), Impaired mobility (07/28/2022), Iron deficiency anemia (08/15/2017), Kidney stones, Obesity, Obesity (BMI 30.0-34.9) (07/22/2019), Other osteoporosis without current pathological fracture (08/30/2019), Other pancytopenia (05/06/2024), Parotitis, acute (09/19/2023), Pharyngitis (01/09/2019), Pneumonia due to infectious organism (03/14/2024), PONV  (postoperative nausea and vomiting), Severe sepsis (11/22/2021), Shingles (2003 approx), Stage 4 chronic kidney disease (11/22/2021), Subclinical hypothyroidism (06/16/2023), Thrombocytopenia, unspecified (11/29/2021), Trouble in sleeping, Unresponsiveness (11/13/2024), and Urinary incontinence.presented to the Emergency Department for evaluation of syncopal event and low bp while at f/u appointment at PCP (Dr. Wick) office. Pt c/o chest pain and soreness which began after undergoing chest compressions on 4/24 due to cardiac arrest. Pt states that she is unsure how long chest compressions lasted. She states that she was sent from [facility] to have back surgery but the procedure was postponed due to concerns of chest pain. After 3 EKGs, the surgeon cancelled the procedure on 4/24.  Pt reports while awaiting for a Lyft, she felt dizzy and light-headed. Pt states that the  of the clinic ran over to catch her and prevented her from hitting her head. Dr. Wick re-evaluated the pt and advised pt to come into the ER. PT confirms that she had a heart transplant years ago. She states that her transplant doctor has been denying her of any further surgeries due to Stage 5 Kidney Failure. She confirms that she is aware of dialysis being her next step. Symptoms are constant and moderate in severity. No mitigating or exacerbating factors reported. No associated sxs included. Patient denies any fever, chills cough, rhinorrhea, blood in stool, melena, or diarrhea. No prior Tx specified. No further complaints or concerns at this time. Dr. Tubbs with Cardiac Transplant team is ok with keeping patient in BR and can obtain cyclosporine level in am with gentle iv hydration.    ER workup revealed CBC to be at baseline with H/H of 9.5/29.2.  CMP revealed BUN/creatinine 45/3.9 with EGFR of 12 (previously 40/2.9 with EGFR 17 on 05/07/2025).  BNP 4 6.  Initial troponin 0.139 with repeat troponin 0.095.  UA with +3 leukocyte  "esterase, 17 WBCs, rare bacteria.  EKG revealed normal sinus rhythm with right bundle-branch block with a ventricular rate of 75 beats per minute and a QT/QTC of 452/504.  Orthostatics negative.  Vital signs stable.  Hospital Medicine consulted to admit patient for gentle IV hydration for GRACE superimposed on CKD and syncope.  Patient in agreement with treatment plan.  Patient admitted under inpatient status.      PCP: Elis Wick      Overview/Hospital Course:  The patient was first evaluated on 5/14/25 while eating lunch and reported reduced fatigue with mild residual chest tenderness; laboratory results showed Cr 3.1 with post-transfusion H/H improvement, and orthostatic vitals were pending. On 5/15/25 she returned from PT/OT and experienced abrupt 10/10 substernal constriction after straining for a BM--symptoms reminiscent of her February arrest and possibly aggravated by CPR-related rib fractures--yet pain eased with two doses of NTG while VS remained stable. Repeat labs revealed rising Cr 3.6 and H/H 9.0/27.7, and a STAT troponin was sent but was lower than previous lab draw. Cardiology was consulted, advised transfer to the transplant team at the [facility]. Discussed with on call Heart Transplant provider who agreed with the transfer with hospital medicine to accept. Transfer Center was notified and transfer process was initiated.     Interval history:  See hospital course about episode leading to transfer to [facility].      Objective:   /68 (Patient Position: Lying)   Pulse 94   Temp 98 °F (36.7 °C) (Oral)   Resp 18   Ht 5' 2" (1.575 m)   Wt 62.1 kg (137 lb)   LMP 06/20/1983 (Within Years)   SpO2 98%   BMI 25.06 kg/m²   No intake or output data in the 24 hours ending 05/15/25 9509      PHYSICAL EXAM  Vitals reviewed  Constitutional:       General: She is awake. She is not in acute distress.     Appearance: Normal appearance. She is well-developed and well-groomed. She is not " "ill-appearing, toxic-appearing or diaphoretic.   Cardiovascular:      Rate and Rhythm: Normal rate and regular rhythm.      Heart sounds: Normal heart sounds. No murmur heard.  Pulmonary:      Effort: Pulmonary effort is normal.      Breath sounds: Normal breath sounds. No decreased breath sounds, wheezing, rhonchi or rales.   Abdominal:      General: Bowel sounds are normal.      Palpations: Abdomen is soft.      Tenderness: There is no abdominal tenderness. There is no right CVA tenderness, left CVA tenderness, guarding or rebound.   Musculoskeletal:      Cervical back: Normal range of motion and neck supple.      Right lower leg: No edema.      Left lower leg: No edema.      Comments: 5/5 strength throughout   Skin:     General: Skin is warm and dry.      Capillary Refill: Capillary refill takes less than 2 seconds.   Neurological:      General: No focal deficit present.      Mental Status: She is alert and oriented to person, place, and time. Mental status is at baseline.      GCS: GCS eye subscore is 4. GCS verbal subscore is 5. GCS motor subscore is 6.      Cranial Nerves: Cranial nerves 2-12 are intact.      Sensory: Sensation is intact.      Motor: Motor function is intact.   Psychiatric:         Mood and Affect: Mood normal.         Speech: Speech normal.         Behavior: Behavior normal. Behavior is cooperative.     LABS  All labs from the past 24 hours were reviewed.     BMP:   Recent Labs   Lab 05/15/25  0443   GLU 88      K 4.7      CO2 23   BUN 48*   CREATININE 3.6*   CALCIUM 8.4*     CBC:   Recent Labs   Lab 05/14/25  0510 05/15/25  0443   WBC 4.38 3.97   HGB 9.1* 9.0*   HCT 28.1* 27.7*    206     CMP:   Recent Labs   Lab 05/14/25  0510 05/15/25  0443    140   K 4.1 4.7    106   CO2 21* 23   GLU 92 88   BUN 35* 48*   CREATININE 3.1* 3.6*   CALCIUM 8.5* 8.4*   ANIONGAP 13 11     Cardiac Markers:   No results for input(s): "CKMB", "MYOGLOBIN", "BNP", "TROPISTAT" in the " "last 48 hours.    Coagulation: No results for input(s): "PT", "INR", "APTT" in the last 48 hours.  Lactic Acid: No results for input(s): "LACTATE" in the last 48 hours.  Magnesium:   No results for input(s): "MG" in the last 48 hours.    Troponin:   Recent Labs   Lab 05/15/25  1407   TROPONINI 0.035*     TSH:   Recent Labs   Lab 04/25/25  0720   TSH 12.101*     Urine Studies:   No results for input(s): "COLORU", "APPEARANCEUA", "PHUR", "SPECGRAV", "PROTEINUA", "GLUCUA", "KETONESU", "BILIRUBINUA", "OCCULTUA", "NITRITE", "UROBILINOGEN", "LEUKOCYTESUR", "RBCUA", "WBCUA", "BACTERIA", "SQUAMEPITHEL", "HYALINECASTS" in the last 48 hours.    Invalid input(s): "WRIGHTSUR"      IMAGING  All imaging from the past 24 hours were reviewed.     Imaging Results    None             Assessment & Plan  Syncope  Resolved, no recurrence of symptoms    Acute kidney injury superimposed on stage 4 chronic kidney disease  GRACE is likely due to pre-renal azotemia due to intravascular volume depletion. Baseline creatinine is 2.9. Most recent creatinine and eGFR are listed below.  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   05/15/2025  -Stat troponin during episode of CP lower than previous lab draw  -CP improved to 5/10 severity after nitro SL x 2 doses  -Transfer to [facility] initiated  -Transplant team to see as consult    Essential hypertension  controlled    Major depressive disorder, single episode, moderate  controlled    Gastroesophageal reflux disease without esophagitis  controlled      Fibromyalgia  Controlled  -continue Lyrica    Normocytic anemia    Recent Labs     05/13/25  0441 " 05/14/25  0510 05/15/25  0443   HGB 7.6* 9.1* 9.0*   HCT 23.4* 28.1* 27.7*     No obvious signs of bleeding on exam. Downtrend from previous lab draws, could be contributing to current symptoms  --serial CBC to trend h&h  --iron studies, B12, folic acid, FOBT ordered for completion  --low threshold to transfuse for hgb < 7      VTE Risk Mitigation (From admission, onward)           Ordered     heparin (porcine) injection 5,000 Units  Every 8 hours         05/12/25 2146     IP VTE HIGH RISK PATIENT  Once         05/12/25 2146     Place sequential compression device  Until discontinued         05/12/25 2146                    Discharge Planning   RATNA: 5/17/2025     Code Status: Partial Code   Medical Readiness for Discharge Date:   Discharge Plan A: Assisted Living                        Ra Rowe MD  Department of Hospital Medicine   [facility] ([facility])

## 2025-05-16 NOTE — PLAN OF CARE
Nutrition Recommendation/Intervention for malnutrition 5/16/25:   1. Recommend a Renal non dialysis, Heart Healthy diet with fluid restrictions per MD/NP.  2. Recommend Suplena BID to assist filling nutritional gaps   3. Recommend starting a bowel regimen.  4. Encourage PO and supplement intake, recommend feeding assistance as warranted.   5. Weigh twice weekly  6. Collaboration by nutrition professional with other providers.    Goals:   1. Pt will tolerate and consume >75% EEN and EPN prior to RD follow up 2. Pt has a BM prior to RD follow up.     Isi Barker RDN, LDN

## 2025-05-16 NOTE — PLAN OF CARE
[facility] - Telemetry (Hospital)  Discharge Final Note    Primary Care Provider: Elis Wick MD    Expected Discharge Date: 5/16/2025    Final Discharge Note (most recent)       Final Note - 05/16/25 1254          Final Note    Assessment Type Final Discharge Note     Anticipated Discharge Disposition Hospice/Medical Facility        Post-Acute Status    Discharge Delays None known at this time                     Important Message from Medicare             Contact Info       Courtney Tubbs MD   Specialty: Cardiology, Transplant   Relationship: Consulting Physician    [address]  [facility] [address]   Phone: [phone]       Next Steps: Schedule an appointment as soon as possible for a visit    Instructions: Someone will reach out to patient and schedule routine hospital follow up    Elis Wick MD   Specialty: Internal Medicine   Relationship: PCP - General  Physician    [address]  [facility] [address]   Phone: [phone]       Next Steps: Follow up    Instructions: Routine follow up following hospitalization          Patient being transferred to [facility] for higher level of care.

## 2025-05-16 NOTE — ASSESSMENT & PLAN NOTE
Stable, enzymes flattened.   05/15/2025  -Stat troponin during episode of CP lower than previous lab draw  -CP improved to 5/10 severity after nitro SL x 2 doses  -Transfer to [facility] [place] initiated  -Transplant team to see as consult

## 2025-05-16 NOTE — ASSESSMENT & PLAN NOTE
Recent Labs     05/13/25  0441 05/14/25  0510 05/15/25  0443   HGB 7.6* 9.1* 9.0*   HCT 23.4* 28.1* 27.7*     No obvious signs of bleeding on exam. Downtrend from previous lab draws, could be contributing to current symptoms  --serial CBC to trend h&h  --iron studies, B12, folic acid, FOBT ordered for completion  --low threshold to transfuse for hgb < 7

## 2025-05-16 NOTE — PLAN OF CARE
Plan of care reviewed with patient with verbal understanding. Chart and orders reviewed.  Pt remains free from falls this shift, Safety precautions in place.   Pt currently resting comfortably in bed. Pain controlled with po pain med (see emar for pain admin).  Purposeful rounding with bed in lowest position, side rails up, call light in reach.  Will continue to monitor until end of shift.       Problem: Adult Inpatient Plan of Care  Goal: Plan of Care Review  Outcome: Progressing  Flowsheets (Taken 5/16/2025 0105)  Plan of Care Reviewed With: patient  Goal: Patient-Specific Goal (Individualized)  Outcome: Progressing  Flowsheets (Taken 5/16/2025 0105)  Individualized Care Needs: Pt request that she be checked up on frequently.  Anxieties, Fears or Concerns: She worrys about her heart and the transfer  Patient/Family-Specific Goals (Include Timeframe): Pt will be hemodynamically stable by end of admission.     Problem: Acute Kidney Injury/Impairment  Goal: Fluid and Electrolyte Balance  Outcome: Progressing     Problem: Fall Injury Risk  Goal: Absence of Fall and Fall-Related Injury  Outcome: Progressing     Problem: Pain Acute  Goal: Optimal Pain Control and Function  Outcome: Progressing

## 2025-05-16 NOTE — PLAN OF CARE
Problem: Adult Inpatient Plan of Care  Goal: Plan of Care Review  Outcome: Progressing  Goal: Patient-Specific Goal (Individualized)  Outcome: Progressing  Goal: Absence of Hospital-Acquired Illness or Injury  Outcome: Progressing  Goal: Optimal Comfort and Wellbeing  Outcome: Progressing  Goal: Readiness for Transition of Care  Outcome: Progressing     Problem: Acute Kidney Injury/Impairment  Goal: Fluid and Electrolyte Balance  Outcome: Progressing  Goal: Improved Oral Intake  Outcome: Progressing  Goal: Effective Renal Function  Outcome: Progressing     Problem: Skin Injury Risk Increased  Goal: Skin Health and Integrity  Outcome: Progressing     Problem: Fall Injury Risk  Goal: Absence of Fall and Fall-Related Injury  Outcome: Progressing     Problem: Pain Acute  Goal: Optimal Pain Control and Function  Outcome: Progressing

## 2025-05-16 NOTE — ASSESSMENT & PLAN NOTE
-Atypical, MSK in nature likely due to recent CPR    5/15/205  -Patient with CP/hypotension after working with PT/OT  -Repeat labs and EKG pending  -Being transferred to [facility]    5/16/2025  -Recurrent episode this AM, relieved with nitro  -EKG reviewed, no acute changes  -Trop trending down  -Continue OMT, awaiting transfer to [facility]

## 2025-05-16 NOTE — SUBJECTIVE & OBJECTIVE
Review of Systems   Constitutional: Positive for malaise/fatigue.   HENT: Negative.     Eyes: Negative.    Cardiovascular:  Positive for chest pain.   Respiratory: Negative.     Endocrine: Negative.    Hematologic/Lymphatic: Negative.    Skin: Negative.    Musculoskeletal: Negative.    Gastrointestinal: Negative.    Genitourinary: Negative.    Neurological:  Positive for dizziness, light-headedness and weakness.   Psychiatric/Behavioral: Negative.     Allergic/Immunologic: Negative.      Objective:     Vital Signs (Most Recent):  Temp: 98 °F (36.7 °C) (05/16/25 1147)  Pulse: 99 (05/16/25 1147)  Resp: 19 (05/16/25 1147)  BP: 136/81 (05/16/25 1147)  SpO2: 98 % (05/16/25 1147) Vital Signs (24h Range):  Temp:  [98 °F (36.7 °C)-98.9 °F (37.2 °C)] 98 °F (36.7 °C)  Pulse:  [] 99  Resp:  [18-20] 19  SpO2:  [94 %-98 %] 98 %  BP: (119-145)/(68-89) 136/81     Weight: 62.1 kg (137 lb)  Body mass index is 25.06 kg/m².     SpO2: 98 %         Intake/Output Summary (Last 24 hours) at 5/16/2025 1337  Last data filed at 5/16/2025 0500  Gross per 24 hour   Intake 480 ml   Output --   Net 480 ml       Lines/Drains/Airways       Peripheral Intravenous Line  Duration                  Peripheral IV - Single Lumen 05/13/25 1545 22 G Anterior;Distal;Right Forearm 2 days                       Physical Exam  Vitals and nursing note reviewed.   Constitutional:       Appearance: Normal appearance.   HENT:      Head: Normocephalic and atraumatic.   Eyes:      Pupils: Pupils are equal, round, and reactive to light.   Cardiovascular:      Rate and Rhythm: Normal rate and regular rhythm.      Heart sounds: S1 normal and S2 normal.   Pulmonary:      Effort: Pulmonary effort is normal.   Musculoskeletal:      Right lower leg: No edema.      Left lower leg: No edema.   Neurological:      General: No focal deficit present.      Mental Status: She is oriented to person, place, and time.   Psychiatric:      Comments: Anxious           "    Significant Labs: CMP   Recent Labs   Lab 05/15/25  0443 05/16/25  0504    141   K 4.7 4.2    107   CO2 23 24   GLU 88 82   BUN 48* 51*   CREATININE 3.6* 3.4*   CALCIUM 8.4* 8.5*   ANIONGAP 11 10   , CBC   Recent Labs   Lab 05/15/25  0443 05/16/25  0504   WBC 3.97 3.87*   HGB 9.0* 8.7*   HCT 27.7* 27.0*    194   , Troponin No results for input(s): "TROPONINIHS" in the last 48 hours., and All pertinent lab results from the last 24 hours have been reviewed.    Significant Imaging: Echocardiogram: Transthoracic echo (TTE) complete (Cupid Only):   Results for orders placed or performed during the hospital encounter of 04/24/25   Echo   Result Value Ref Range    LV Diastolic Volume 71 mL    Echo EF Estimated 40 %    LV Systolic Volume 43 mL    IVS 0.9 0.6 - 1.1 cm    LVIDd 4.0 3.5 - 6.0 cm    LVIDs 3.3 2.1 - 4.0 cm    LVOT diameter 2.0 cm    PW 0.9 0.6 - 1.1 cm    AV LVOT peak gradient 3 mmHg    LVOT mn grad 2 mmHg    LVOT peak jacky 0.9 m/s    LVOT peak VTI 20.6 cm    RV- marie basal diam 3.4 cm    AV valve area 4.3 cm2    AV area by cont VTI 4.3 cm2    AV peak gradient 3 mmHg    AV mean gradient 1 mmHg    Ao peak jacky 0.8 m/s    Ao VTI 14.9 cm    MV Peak A Jacky 0.59 m/s    E wave deceleration time 138 ms    MV Peak E Jacky 0.72 m/s    E/A ratio 1.22     LV LATERAL E/E' RATIO 9.0     LV SEPTAL E/E' RATIO 12.0     TDI LATERAL 0.08 m/s    TDI SEPTAL 0.06 m/s    TV peak gradient 26 mmHg    TR Max Jacky 2.5 m/s    Ascending aorta 2.4 cm    STJ 3.1 cm    Sinus 3.11 cm    TAPSE 0.9 cm    BSA 1.74 m2    LVOT stroke volume 64.7 cm3    LV Systolic Volume Index 25.3 mL/m2    LV Diastolic Volume Index 41.76 mL/m2    LVOT area 3.1 cm2    FS 17.5 (A) 28 - 44 %    Left Ventricle Relative Wall Thickness 0.45 cm    LV mass 109.7 g    LV Mass Index 64.5 g/m2    E/E' ratio 10 m/s    RV/LV Ratio 0.85 cm    AV Velocity Ratio 1.13     AV index (prosthetic) 1.38     PRESLEY by Velocity Ratio 3.5 cm²    Mean e' 0.07 m/s    ZLVIDS " 0.95     ZLVIDD -1.67     EF 45 %    TV resting pulmonary artery pressure 28 mmHg    RV TB RVSP 6 mmHg    Est. RA pres 3 mmHg    Narrative      S/P heart transplant 1993.    Left Ventricle: The left ventricle is normal in size. Normal wall   thickness. There is concentric remodeling. There is mildly reduced   systolic function. Ejection fraction is approximately 45%. Mild global   hypokinesis present. Contractility is better preserved in the basal   segements. There is normal diastolic function.    Right Ventricle: The right ventricle is normal in size. Systolic   function is moderately reduced. TAPSE is 0.9 cm.    Tricuspid Valve: There is mild to moderate regurgitation.    Pulmonary Artery: The estimated pulmonary artery systolic pressure is   28 mmHg.    IVC/SVC: Normal venous pressure at 3 mmHg.     , EKG: Reviewed, and X-Ray: CXR: X-Ray Chest 1 View (CXR): No results found for this visit on 05/12/25. and X-Ray Chest PA and Lateral (CXR): No results found for this visit on 05/12/25.

## 2025-05-16 NOTE — PROVIDER TRANSFER
Outside Transfer Acceptance Note / Regional Referral Center    Referring facility: [facility]   Referring provider: FAYE MONROY NATHAN P. BERGERON, ASHLYN CUTRER, JUSTIN K.  Accepting facility: [facility]  Accepting provider: EVAN REYES  Admitting provider: ODALIS REYES  Reason for transfer: Higher Level of Care   Transfer diagnosis: hx heart transplant  Transfer specialty requested: Transplant Heart  Transfer specialty notified: Yes  Transfer level: NUMBER 1-5: 2  Bed type requested: TELE  Isolation status: No active isolations   Admission class or status: IP- Inpatient      Narrative     Nadia Damon is a 69 y.o. female with a PMH has a past medical history of Acute ischemic right middle cerebral artery (MCA) stroke (07/20/2024), Aphasia (07/19/2024),  Breast cancer in female (08/2021) s/p right s/p excision and chemo/radiation (11/13/2024), Chronic diastolic heart failure (12/16/2021), anemia, leukopenia, breast CA, cervical spine DJD, and remote history of OHT in 5/1993 with PPM placed at same time.    She was just recently admitted and discharged from [facility] on 5/6/2025. At that time she suffered a brief cardiac arrest during that admission (rhythm unknown, required 1 round of CPR and 1 round of epi). LHC was deferred at that time given patient's renal function and prior negative Cardiac PET in 11/2024.     She was just admitted to [facility] on 5/12 due to hypotension and pre-syncope.  Cardiology at  was consulted after admission for elevated Troponin (Troponin 0.139 > 0.095 > 0.106 >0.035 today.  Per Cards, Elevation likely secondary to demand ischemia from GRACE, hypotension, anemia.    Overview/Hospital Course: The patient was first evaluated on 5/14/25 while eating lunch and reported reduced fatigue with mild residual chest tenderness; laboratory results showed Cr 3.1 with post-transfusion H/H improvement, and orthostatic  "vitals were pending. On 5/15/25 she returned from PT/OT and experienced abrupt 10/10 substernal constriction after straining for a BM--symptoms reminiscent of her February arrest and possibly aggravated by CPR-related rib fractures--yet pain eased with two doses of NTG while VS remained stable. Repeat labs revealed rising Cr 3.6 and H/H 9.0/27.7, and a STAT troponin was sent but was lower than previous lab draw. Cardiology was consulted, advised transfer to the transplant team at the [facility]. Discussed with on call Heart Transplant provider who agreed with the transfer with hospital medicine to accept.    Current VS: /68 (Patient Position: Lying)   Pulse 94   Temp 98 °F (36.7 °C) (Oral)   Resp 18   Ht 5' 2" (1.575 m)   Wt 62.1 kg (137 lb)   LMP 06/20/1983 (Within Years)   SpO2 98%   BMI 25.06 kg/m².    Labs 5/15: WBC 4, Hg 9, , Cr 3.6 (Cr was 3.9 at admission, and 2.9 on 5/7/25), BUN 48.    No CXR since admission - last was 5/7:  Sternal wires are present. Epicardial lead is again noted. Cardiac size is enlarged. Chronic left pericardial scarring is noted.  No acute abnormality is seen.    US bilateral Carotid arteries 5/14: No hemodynamically significant stenosis of either internal carotid artery. Antegrade flow of the bilateral vertebral arteries.    TTE 4/28/25:   S/P heart transplant 1993.   Left Ventricle: The left ventricle is normal in size. Normal wall thickness. There is concentric remodeling. There is mildly reduced systolic function. Ejection fraction is approximately 45%. Mild global hypokinesis present. Contractility is better preserved in the basal segements. There is normal diastolic function.   Right Ventricle: The right ventricle is normal in size. Systolic function is moderately reduced. TAPSE is 0.9 cm.   Tricuspid Valve: There is mild to moderate regurgitation.   Pulmonary Artery: The estimated pulmonary artery systolic pressure is 28 mmHg.   IVC/SVC: Normal " venous pressure at 3 mmHg.    Objective     Vitals: Temp:  [97.7 °F (36.5 °C)-98.9 °F (37.2 °C)] 98 °F (36.7 °C)  Pulse:  [] 94  Resp:  [17-20] 18  SpO2:  [94 %-98 %] 98 %  BP: ()/(54-89) 119/68     Recent Labs: All pertinent labs within the past 24 hours have been reviewed.  Recent Results (from the past 24 hours)   Basic Metabolic Panel    Collection Time: 05/15/25  4:43 AM   Result Value Ref Range    Sodium 140 136 - 145 mmol/L    Potassium 4.7 3.5 - 5.1 mmol/L    Chloride 106 95 - 110 mmol/L    CO2 23 23 - 29 mmol/L    Glucose 88 70 - 110 mg/dL    BUN 48 (H) 8 - 23 mg/dL    Creatinine 3.6 (H) 0.5 - 1.4 mg/dL    Calcium 8.4 (L) 8.7 - 10.5 mg/dL    Anion Gap 11 8 - 16 mmol/L    eGFR 13 (L) >60 mL/min/1.73/m2   CBC with Differential    Collection Time: 05/15/25  4:43 AM   Result Value Ref Range    WBC 3.97 3.90 - 12.70 K/uL    RBC 3.17 (L) 4.00 - 5.40 M/uL    HGB 9.0 (L) 12.0 - 16.0 gm/dL    HCT 27.7 (L) 37.0 - 48.5 %    MCV 87 82 - 98 fL    MCH 28.4 27.0 - 31.0 pg    MCHC 32.5 32.0 - 36.0 g/dL    RDW 16.1 (H) 11.5 - 14.5 %    Platelet Count 206 150 - 450 K/uL    MPV 9.6 9.2 - 12.9 fL    Nucleated RBC 0 <=0 /100 WBC    Neut % 58.3 38 - 73 %    Lymph % 23.2 18 - 48 %    Mono % 14.4 4 - 15 %    Eos % 3.3 <=8 %    Basophil % 0.3 <=1.9 %    Imm Grans % 0.5 0.0 - 0.5 %    Neut # 2.32 1.8 - 7.7 K/uL    Lymph # 0.92 (L) 1 - 4.8 K/uL    Mono # 0.57 0.3 - 1 K/uL    Eos # 0.13 <=0.5 K/uL    Baso # 0.01 <=0.2 K/uL    Imm Grans # 0.02 0.00 - 0.04 K/uL   Occult blood x 1, stool    Collection Time: 05/15/25 10:43 AM    Specimen: Stool   Result Value Ref Range    OCCULT BLOOD STOOL Negative Negative   Troponin I    Collection Time: 05/15/25  2:07 PM   Result Value Ref Range    Troponin-I 0.035 (H) <=0.026 ng/mL            IV access:        Peripheral IV - Single Lumen 05/13/25 1545 22 G Anterior;Distal;Right Forearm (Active)   Site Assessment Dry;Clean;Intact 05/15/25 1600   Line Securement Device Secured with  sutureless device 05/14/25 0850   Extremity Assessment Distal to IV No warmth;No swelling;No redness;No abnormal discoloration 05/15/25 0745   Line Status Saline locked 05/15/25 1600   Dressing Status Clean;Dry;Intact 05/15/25 1600   Dressing Intervention Integrity maintained 05/15/25 1600   Dressing Change Due 05/17/25 05/15/25 0500   Site Change Due 05/17/25 05/14/25 0850   Reason Not Rotated Not due 05/15/25 0745     Infusions: NONE  Allergies:   Review of patient's allergies indicates:   Allergen Reactions    Dobutamine Other (See Comments)     Severe Left sided chest pain after dosage administered for Dobutamine Stress Test; itching    Lisinopril Swelling and Rash    Hydrocodone-acetaminophen Nausea Only    Augmentin [amoxicillin-pot clavulanate] Diarrhea    Zyvox [linezolid] Nausea And Vomiting      NPO: No    Anticoagulation:   Anticoagulants       None             Instructions      Tray Fischer-  Admit to Hospital Medicine  Upon patient arrival to floor, please send SecureChat to [facility] or call extension [phone] (if no answer, do NOT leave a callback number after the beep, rather please send a SecureChat to [facility]), for Hospital Medicine admit team assignment and for additional admit orders for the patient.  Do not page the attending physician associated with the patient on arrival (this physician may not be on duty at the time of arrival).  Rather, always send a SecureChat to [facility] or call [phone] to reach the triage physician for orders and team assignment.

## 2025-05-16 NOTE — ASSESSMENT & PLAN NOTE
Stable, enzymes flattened.   05/15/2025  -Stat troponin during episode of CP lower than previous lab draw  -CP improved to 5/10 severity after nitro SL x 2 doses  -Transfer to [facility] [place] initiated  -Transplant team to see as consult

## 2025-05-16 NOTE — PLAN OF CARE
Pt tolerated interventions well. Required SBA for bed mobility, ambulated 60ft x2 SBA using RW. Recommending low intensity therapy upon d/c.

## 2025-05-16 NOTE — ASSESSMENT & PLAN NOTE
Malnutrition Type:  Context: chronic illness  Level: severe    Related to (etiology):   Physiological causes increasing needs dt chronic illness  Alteration in GI tract function    Signs and Symptoms (as evidenced by):   Unintentional weight loss of >10% in 6 mo  Unable or unwilling to eat sufficient energy/protein to maintain a healthy weight  Food avoidance and/or lack of interest in food    Malnutrition Characteristic Summary:  Weight Loss (Malnutrition): greater than 10% in 6 months  Energy Intake (Malnutrition): less than or equal to 75% for greater than or equal to 1 month  Fluid Accumulation (Malnutrition): mild      Interventions/Recommendations (treatment strategy):  1. Low fat, mineral modified diet.  2. Commercial beverage medical food supplement therapy.  3. Management of nutrition related prescription medication.  4. Collaboration by nutrition professional with other providers.    Nutrition Diagnosis Status:   New

## 2025-05-16 NOTE — ASSESSMENT & PLAN NOTE
-Troponin 0.139>0.095>0.106  -Elevation likely secondary to demand ischemia from GRACE, hypotension, anemia  -Recent TTE 4/2025 with normal EF  -Prior Cardiac PET 11/2024 negative for ischemia/scar  -CP on exam is atypical and reproducible  -Continue OMT as tolerated    5/15/2025  -Recurrent CP after working with PT/OT; marked tenderness on exam  -Troponin and EKG pending  -Being transferred to [facility] [place]    5/16/2025  -Trop yesterday trending down  -Continue CCB, Nifedipine, ASA  -Awaiting transfer

## 2025-05-17 ENCOUNTER — HOSPITAL ENCOUNTER (INPATIENT)
Facility: HOSPITAL | Age: 70
LOS: 1 days | Discharge: HOME HEALTH | DRG: 313 | End: 2025-05-18
Attending: INTERNAL MEDICINE | Admitting: STUDENT IN AN ORGANIZED HEALTH CARE EDUCATION/TRAINING PROGRAM
Payer: MEDICARE

## 2025-05-17 DIAGNOSIS — R07.9 CHEST PAIN: ICD-10-CM

## 2025-05-17 DIAGNOSIS — Z94.1 S/P ORTHOTOPIC HEART TRANSPLANT: Primary | ICD-10-CM

## 2025-05-17 DIAGNOSIS — I50.32 CHRONIC DIASTOLIC (CONGESTIVE) HEART FAILURE: Chronic | ICD-10-CM

## 2025-05-17 PROBLEM — B00.9 HSV (HERPES SIMPLEX VIRUS) INFECTION: Status: ACTIVE | Noted: 2025-05-17

## 2025-05-17 PROBLEM — I10 HTN (HYPERTENSION): Status: ACTIVE | Noted: 2025-05-17

## 2025-05-17 PROBLEM — F41.8 DEPRESSION WITH ANXIETY: Status: ACTIVE | Noted: 2025-05-17

## 2025-05-17 PROBLEM — N17.9 ACUTE KIDNEY INJURY SUPERIMPOSED ON CKD: Status: ACTIVE | Noted: 2025-05-17

## 2025-05-17 PROBLEM — N18.9 ACUTE KIDNEY INJURY SUPERIMPOSED ON CKD: Status: ACTIVE | Noted: 2025-05-17

## 2025-05-17 PROBLEM — K21.9 GERD (GASTROESOPHAGEAL REFLUX DISEASE): Status: ACTIVE | Noted: 2025-05-17

## 2025-05-17 LAB
ABSOLUTE EOSINOPHIL (OHS): 0.11 K/UL
ABSOLUTE MONOCYTE (OHS): 0.47 K/UL (ref 0.3–1)
ABSOLUTE NEUTROPHIL COUNT (OHS): 2.07 K/UL (ref 1.8–7.7)
ALBUMIN SERPL BCP-MCNC: 3 G/DL (ref 3.5–5.2)
ALP SERPL-CCNC: 170 UNIT/L (ref 40–150)
ALT SERPL W/O P-5'-P-CCNC: 33 UNIT/L (ref 10–44)
ANION GAP (OHS): 10 MMOL/L (ref 8–16)
AST SERPL-CCNC: 50 UNIT/L (ref 11–45)
BASOPHILS # BLD AUTO: 0.02 K/UL
BASOPHILS NFR BLD AUTO: 0.5 %
BILIRUB SERPL-MCNC: 0.4 MG/DL (ref 0.1–1)
BUN SERPL-MCNC: 40 MG/DL (ref 8–23)
CALCIUM SERPL-MCNC: 8.8 MG/DL (ref 8.7–10.5)
CHLORIDE SERPL-SCNC: 106 MMOL/L (ref 95–110)
CO2 SERPL-SCNC: 25 MMOL/L (ref 23–29)
CREAT SERPL-MCNC: 3.1 MG/DL (ref 0.5–1.4)
ERYTHROCYTE [DISTWIDTH] IN BLOOD BY AUTOMATED COUNT: 16.2 % (ref 11.5–14.5)
FERRITIN SERPL-MCNC: 208 NG/ML (ref 20–300)
FOLATE SERPL-MCNC: 16.7 NG/ML (ref 4–24)
GFR SERPLBLD CREATININE-BSD FMLA CKD-EPI: 16 ML/MIN/1.73/M2
GLUCOSE SERPL-MCNC: 80 MG/DL (ref 70–110)
HCT VFR BLD AUTO: 29.3 % (ref 37–48.5)
HGB BLD-MCNC: 9.3 GM/DL (ref 12–16)
IMM GRANULOCYTES # BLD AUTO: 0.02 K/UL (ref 0–0.04)
IMM GRANULOCYTES NFR BLD AUTO: 0.5 % (ref 0–0.5)
IRON SATN MFR SERPL: 29 % (ref 20–50)
IRON SERPL-MCNC: 86 UG/DL (ref 30–160)
LYMPHOCYTES # BLD AUTO: 1.17 K/UL (ref 1–4.8)
MAGNESIUM SERPL-MCNC: 1.8 MG/DL (ref 1.6–2.6)
MCH RBC QN AUTO: 27.8 PG (ref 27–31)
MCHC RBC AUTO-ENTMCNC: 31.7 G/DL (ref 32–36)
MCV RBC AUTO: 88 FL (ref 82–98)
NUCLEATED RBC (/100WBC) (OHS): 0 /100 WBC
OHS QRS DURATION: 152 MS
OHS QTC CALCULATION: 547 MS
PHOSPHATE SERPL-MCNC: 3.6 MG/DL (ref 2.7–4.5)
PLATELET # BLD AUTO: 226 K/UL (ref 150–450)
PMV BLD AUTO: 9.9 FL (ref 9.2–12.9)
POTASSIUM SERPL-SCNC: 3.8 MMOL/L (ref 3.5–5.1)
PROT SERPL-MCNC: 7.5 GM/DL (ref 6–8.4)
RBC # BLD AUTO: 3.35 M/UL (ref 4–5.4)
RELATIVE EOSINOPHIL (OHS): 2.8 %
RELATIVE LYMPHOCYTE (OHS): 30.3 % (ref 18–48)
RELATIVE MONOCYTE (OHS): 12.2 % (ref 4–15)
RELATIVE NEUTROPHIL (OHS): 53.7 % (ref 38–73)
SODIUM SERPL-SCNC: 141 MMOL/L (ref 136–145)
TIBC SERPL-MCNC: 297 UG/DL (ref 250–450)
TRANSFERRIN SERPL-MCNC: 201 MG/DL (ref 200–375)
TROPONIN I SERPL HS-MCNC: 10 NG/L
VIT B12 SERPL-MCNC: 1254 PG/ML (ref 210–950)
WBC # BLD AUTO: 3.86 K/UL (ref 3.9–12.7)

## 2025-05-17 PROCEDURE — 84100 ASSAY OF PHOSPHORUS: CPT | Mod: HCNC | Performed by: STUDENT IN AN ORGANIZED HEALTH CARE EDUCATION/TRAINING PROGRAM

## 2025-05-17 PROCEDURE — 80053 COMPREHEN METABOLIC PANEL: CPT | Mod: HCNC | Performed by: STUDENT IN AN ORGANIZED HEALTH CARE EDUCATION/TRAINING PROGRAM

## 2025-05-17 PROCEDURE — 85025 COMPLETE CBC W/AUTO DIFF WBC: CPT | Mod: HCNC | Performed by: STUDENT IN AN ORGANIZED HEALTH CARE EDUCATION/TRAINING PROGRAM

## 2025-05-17 PROCEDURE — 25000242 PHARM REV CODE 250 ALT 637 W/ HCPCS: Mod: HCNC | Performed by: STUDENT IN AN ORGANIZED HEALTH CARE EDUCATION/TRAINING PROGRAM

## 2025-05-17 PROCEDURE — 83735 ASSAY OF MAGNESIUM: CPT | Mod: HCNC | Performed by: STUDENT IN AN ORGANIZED HEALTH CARE EDUCATION/TRAINING PROGRAM

## 2025-05-17 PROCEDURE — 82607 VITAMIN B-12: CPT | Mod: HCNC | Performed by: STUDENT IN AN ORGANIZED HEALTH CARE EDUCATION/TRAINING PROGRAM

## 2025-05-17 PROCEDURE — 82728 ASSAY OF FERRITIN: CPT | Mod: HCNC | Performed by: STUDENT IN AN ORGANIZED HEALTH CARE EDUCATION/TRAINING PROGRAM

## 2025-05-17 PROCEDURE — 25000003 PHARM REV CODE 250: Mod: HCNC | Performed by: STUDENT IN AN ORGANIZED HEALTH CARE EDUCATION/TRAINING PROGRAM

## 2025-05-17 PROCEDURE — 94761 N-INVAS EAR/PLS OXIMETRY MLT: CPT | Mod: HCNC

## 2025-05-17 PROCEDURE — 84484 ASSAY OF TROPONIN QUANT: CPT | Mod: HCNC | Performed by: STUDENT IN AN ORGANIZED HEALTH CARE EDUCATION/TRAINING PROGRAM

## 2025-05-17 PROCEDURE — 36415 COLL VENOUS BLD VENIPUNCTURE: CPT | Mod: HCNC | Performed by: STUDENT IN AN ORGANIZED HEALTH CARE EDUCATION/TRAINING PROGRAM

## 2025-05-17 PROCEDURE — 20600001 HC STEP DOWN PRIVATE ROOM: Mod: HCNC

## 2025-05-17 PROCEDURE — 84466 ASSAY OF TRANSFERRIN: CPT | Mod: HCNC | Performed by: STUDENT IN AN ORGANIZED HEALTH CARE EDUCATION/TRAINING PROGRAM

## 2025-05-17 PROCEDURE — 63600175 PHARM REV CODE 636 W HCPCS: Mod: HCNC | Performed by: STUDENT IN AN ORGANIZED HEALTH CARE EDUCATION/TRAINING PROGRAM

## 2025-05-17 PROCEDURE — 82746 ASSAY OF FOLIC ACID SERUM: CPT | Mod: HCNC | Performed by: STUDENT IN AN ORGANIZED HEALTH CARE EDUCATION/TRAINING PROGRAM

## 2025-05-17 RX ORDER — NITROGLYCERIN 0.4 MG/1
0.4 TABLET SUBLINGUAL EVERY 5 MIN PRN
Status: DISCONTINUED | OUTPATIENT
Start: 2025-05-17 | End: 2025-05-18 | Stop reason: HOSPADM

## 2025-05-17 RX ORDER — LANOLIN ALCOHOL/MO/W.PET/CERES
400 CREAM (GRAM) TOPICAL ONCE
Status: COMPLETED | OUTPATIENT
Start: 2025-05-17 | End: 2025-05-17

## 2025-05-17 RX ORDER — SODIUM BICARBONATE 650 MG/1
650 TABLET ORAL 2 TIMES DAILY
Status: DISCONTINUED | OUTPATIENT
Start: 2025-05-17 | End: 2025-05-18 | Stop reason: HOSPADM

## 2025-05-17 RX ORDER — HEPARIN SODIUM 5000 [USP'U]/ML
5000 INJECTION, SOLUTION INTRAVENOUS; SUBCUTANEOUS EVERY 8 HOURS
Status: DISCONTINUED | OUTPATIENT
Start: 2025-05-17 | End: 2025-05-18 | Stop reason: HOSPADM

## 2025-05-17 RX ORDER — PANTOPRAZOLE SODIUM 40 MG/1
40 TABLET, DELAYED RELEASE ORAL DAILY
Status: DISCONTINUED | OUTPATIENT
Start: 2025-05-17 | End: 2025-05-18 | Stop reason: HOSPADM

## 2025-05-17 RX ORDER — PREGABALIN 50 MG/1
50 CAPSULE ORAL NIGHTLY
Status: DISCONTINUED | OUTPATIENT
Start: 2025-05-17 | End: 2025-05-18 | Stop reason: HOSPADM

## 2025-05-17 RX ORDER — CARVEDILOL 3.12 MG/1
3.12 TABLET ORAL 2 TIMES DAILY WITH MEALS
Status: DISCONTINUED | OUTPATIENT
Start: 2025-05-17 | End: 2025-05-18 | Stop reason: HOSPADM

## 2025-05-17 RX ORDER — IBUPROFEN 200 MG
24 TABLET ORAL
Status: DISCONTINUED | OUTPATIENT
Start: 2025-05-17 | End: 2025-05-18 | Stop reason: HOSPADM

## 2025-05-17 RX ORDER — NALOXONE HCL 0.4 MG/ML
0.02 VIAL (ML) INJECTION
Status: DISCONTINUED | OUTPATIENT
Start: 2025-05-17 | End: 2025-05-18 | Stop reason: HOSPADM

## 2025-05-17 RX ORDER — IBUPROFEN 200 MG
16 TABLET ORAL
Status: DISCONTINUED | OUTPATIENT
Start: 2025-05-17 | End: 2025-05-18 | Stop reason: HOSPADM

## 2025-05-17 RX ORDER — ACETAMINOPHEN 325 MG/1
650 TABLET ORAL EVERY 4 HOURS PRN
Status: DISCONTINUED | OUTPATIENT
Start: 2025-05-17 | End: 2025-05-18 | Stop reason: HOSPADM

## 2025-05-17 RX ORDER — ASPIRIN 81 MG/1
81 TABLET ORAL DAILY
Status: DISCONTINUED | OUTPATIENT
Start: 2025-05-17 | End: 2025-05-18 | Stop reason: HOSPADM

## 2025-05-17 RX ORDER — AMOXICILLIN 250 MG
1 CAPSULE ORAL 2 TIMES DAILY PRN
Status: DISCONTINUED | OUTPATIENT
Start: 2025-05-17 | End: 2025-05-18 | Stop reason: HOSPADM

## 2025-05-17 RX ORDER — POLYETHYLENE GLYCOL 3350 17 G/17G
17 POWDER, FOR SOLUTION ORAL DAILY PRN
Status: DISCONTINUED | OUTPATIENT
Start: 2025-05-17 | End: 2025-05-18 | Stop reason: HOSPADM

## 2025-05-17 RX ORDER — TALC
6 POWDER (GRAM) TOPICAL NIGHTLY PRN
Status: DISCONTINUED | OUTPATIENT
Start: 2025-05-17 | End: 2025-05-18 | Stop reason: HOSPADM

## 2025-05-17 RX ORDER — ACYCLOVIR 200 MG/1
200 CAPSULE ORAL 3 TIMES DAILY
Status: DISCONTINUED | OUTPATIENT
Start: 2025-05-17 | End: 2025-05-18 | Stop reason: HOSPADM

## 2025-05-17 RX ORDER — GLUCAGON 1 MG
1 KIT INJECTION
Status: DISCONTINUED | OUTPATIENT
Start: 2025-05-17 | End: 2025-05-18 | Stop reason: HOSPADM

## 2025-05-17 RX ORDER — SODIUM CHLORIDE 0.9 % (FLUSH) 0.9 %
10 SYRINGE (ML) INJECTION EVERY 12 HOURS PRN
Status: DISCONTINUED | OUTPATIENT
Start: 2025-05-17 | End: 2025-05-18 | Stop reason: HOSPADM

## 2025-05-17 RX ORDER — SERTRALINE HYDROCHLORIDE 25 MG/1
25 TABLET, FILM COATED ORAL DAILY
Status: DISCONTINUED | OUTPATIENT
Start: 2025-05-17 | End: 2025-05-18 | Stop reason: HOSPADM

## 2025-05-17 RX ORDER — TIZANIDINE 2 MG/1
4 TABLET ORAL 2 TIMES DAILY PRN
Status: DISCONTINUED | OUTPATIENT
Start: 2025-05-17 | End: 2025-05-18 | Stop reason: HOSPADM

## 2025-05-17 RX ORDER — NIFEDIPINE 30 MG/1
60 TABLET, EXTENDED RELEASE ORAL DAILY
Status: DISCONTINUED | OUTPATIENT
Start: 2025-05-17 | End: 2025-05-18 | Stop reason: HOSPADM

## 2025-05-17 RX ORDER — PROCHLORPERAZINE MALEATE 5 MG
5 TABLET ORAL EVERY 6 HOURS PRN
Status: DISCONTINUED | OUTPATIENT
Start: 2025-05-17 | End: 2025-05-18 | Stop reason: HOSPADM

## 2025-05-17 RX ADMIN — CYCLOSPORINE 50 MG: 25 CAPSULE, LIQUID FILLED ORAL at 06:05

## 2025-05-17 RX ADMIN — TIZANIDINE 4 MG: 2 TABLET ORAL at 07:05

## 2025-05-17 RX ADMIN — SODIUM BICARBONATE 650 MG TABLET 650 MG: at 08:05

## 2025-05-17 RX ADMIN — ACYCLOVIR 200 MG: 200 CAPSULE ORAL at 01:05

## 2025-05-17 RX ADMIN — CARVEDILOL 3.12 MG: 3.12 TABLET, FILM COATED ORAL at 05:05

## 2025-05-17 RX ADMIN — NITROGLYCERIN 0.4 MG: 0.4 TABLET, ORALLY DISINTEGRATING SUBLINGUAL at 07:05

## 2025-05-17 RX ADMIN — HEPARIN SODIUM 5000 UNITS: 5000 INJECTION INTRAVENOUS; SUBCUTANEOUS at 02:05

## 2025-05-17 RX ADMIN — TIZANIDINE 4 MG: 2 TABLET ORAL at 08:05

## 2025-05-17 RX ADMIN — HEPARIN SODIUM 5000 UNITS: 5000 INJECTION INTRAVENOUS; SUBCUTANEOUS at 08:05

## 2025-05-17 RX ADMIN — PREGABALIN 50 MG: 50 CAPSULE ORAL at 08:05

## 2025-05-17 RX ADMIN — Medication 400 MG: at 09:05

## 2025-05-17 RX ADMIN — ASPIRIN 81 MG: 81 TABLET, COATED ORAL at 08:05

## 2025-05-17 RX ADMIN — ACYCLOVIR 200 MG: 200 CAPSULE ORAL at 08:05

## 2025-05-17 RX ADMIN — ACYCLOVIR 200 MG: 200 CAPSULE ORAL at 09:05

## 2025-05-17 RX ADMIN — HEPARIN SODIUM 5000 UNITS: 5000 INJECTION INTRAVENOUS; SUBCUTANEOUS at 05:05

## 2025-05-17 RX ADMIN — POTASSIUM BICARBONATE 25 MEQ: 978 TABLET, EFFERVESCENT ORAL at 09:05

## 2025-05-17 RX ADMIN — PANTOPRAZOLE SODIUM 40 MG: 40 TABLET, DELAYED RELEASE ORAL at 08:05

## 2025-05-17 RX ADMIN — CYCLOSPORINE 50 MG: 25 CAPSULE, LIQUID FILLED ORAL at 08:05

## 2025-05-17 RX ADMIN — SERTRALINE HYDROCHLORIDE 25 MG: 25 TABLET ORAL at 08:05

## 2025-05-17 NOTE — ASSESSMENT & PLAN NOTE
Recurrent chest pain on 5/14 in pt with complex Hx including HFmrEF s/p transplant in 1993 c/b CAD in transplant heart and placement of PPM due to OHT and recent admission due to cardiac arrest with unknown rhythm requiring CPR with 1x round of epi after which LHC was deferred due to renal function and prior negative cardiac PET in 11/2024 and she was discharged on 5/6 then readmitted to [facility] on 05/12 with symptomatic hypotension and evidence of myocardial ischemia as well as GRACE on CKD which was felt to possibly be in setting of anemia/demand ischemia due to that at that time. CP is similar to prior chronic pre-cardiac arrest chest pain per pt and is relieved by NTG. Mildly elevated troponins but downtrended with continued improvement of anemia and continued overall VS/clinical stability however due to persistent of pain, pt transferred to [facility] for evaluation with transplant cardiology service.    Plan  - Consult transplant cardiology, follow up recs.  - Monitor on continuous cardiac monitoring and pulse ox. PRN EKG/trop if chest pain or other concerns.  - Continue pt's other chronic meds.

## 2025-05-17 NOTE — SUBJECTIVE & OBJECTIVE
Past Medical History:   Diagnosis Date    Abdominal wall hernia     CT Renal 6/11/2018---Small fat containing superior ventral abdominal wall hernia at the epicardial pacing lead site.    Abnormal mammogram 10/12/2021    Acute cystitis without hematuria 05/10/2022    Acute ischemic right middle cerebral artery (MCA) stroke 07/20/2024    Adverse drug reaction 11/12/2024    GRACE (acute kidney injury) 11/22/2021    Anxiety     Aphasia 07/19/2024    Arthritis     ZEN HIPS    Breast cancer in female 08/2021    LEFT BREAST    Breast mass, right 11/13/2024    RIGHT BREAST MASS (Problem)      Bronchitis 08/18/2016    Never smoked      C. difficile colitis 11/29/2021    Cellulitis of axilla, left 12/23/2021    Chronic diastolic heart failure 12/16/2021    Chronic kidney disease     stage 4, GFR 15-29 ml/min    Chronic midline low back pain without sciatica 06/18/2018    CKD (chronic kidney disease) stage 4, GFR 15-29 ml/min 04/20/2016    US Retro   5/16/2018---Mild cortical thinning and increased cortical echogenicity.  Findings can be seen with medical renal disease.  8/31/216--- Echogenic kidneys with diffuse cortical thinning suggesting  medical renal disease. 2 complex right renal cortical cysts.      Closed nondisplaced fracture of distal phalanx of left great toe with routine healing 10/22/2018    Coronary artery disease 1993    heart transplant    COVID-19 in immunocompromised patient 02/26/2024    Cystitis 05/10/2022    Depression     Encounter for blood transfusion     Encounter for palliative care 10/14/2024    Fibromyalgia     on Lyrica    Heart failure     native heart cardiomyopathy    Heart transplanted 1993    due to cardiomyopathy    History of hyperparathyroidism; Hyperparathyroidism, secondary renal     PT DENIES    Hypertension     Immune disorder     anti rejection meds    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    Impaired mobility 07/28/2022    Iron deficiency anemia 08/15/2017     Kidney stones     passed per pt    Obesity     Obesity (BMI 30.0-34.9) 07/22/2019    Other osteoporosis without current pathological fracture 08/30/2019    Other pancytopenia 05/06/2024    Parotitis, acute 09/19/2023    Pharyngitis 01/09/2019    Pneumonia due to infectious organism 03/14/2024    PONV (postoperative nausea and vomiting)     Severe sepsis 11/22/2021    Shingles 2003 approx    left leg    Stage 4 chronic kidney disease 11/22/2021    Subclinical hypothyroidism 06/16/2023    Thrombocytopenia, unspecified 11/29/2021    Trouble in sleeping     Unresponsiveness 11/13/2024    Urinary incontinence        Past Surgical History:   Procedure Laterality Date    bladder implant Right 06/11/2024    BLADDER SURGERY  2015 approx    mesh - Dr Everett then 2nd reconstructive sx Dr Onofre    BREAST BIOPSY Bilateral     NEGATIVE    BREAST BIOPSY Right 10/31/2022    benign    BREAST LUMPECTOMY Left 2021    BREAST SURGERY Left 09/28/2015    Bx - benign    BREAST SURGERY Right 12/2015    Bx benign    CARDIAC PACEMAKER REMOVAL Left 06/26/2014    Pacer defirillator removed. Put in 1993 aat time of heart transplant    CARPAL TUNNEL RELEASE Left 03/03/2015    Dr. Hall    COLONOSCOPY N/A 02/25/2021    Procedure: COLONOSCOPY;  Surgeon: Freida Ramirez MD;  Location: [facility];  Service: Endoscopy;  Laterality: N/A;    CYSTOCELE REPAIR      Twice with mesh removal    ECHOCARDIOGRAM,TRANSESOPHAGEAL N/A 4/26/2025    Procedure: Transesophageal echo (AYAAN) intra-procedure log documentation;  Surgeon: Barron Chinchilla MD;  Location: [facility] OR [address] [place] [address];  Service: Cardiothoracic;  Laterality: N/A;    EPIDURAL STEROID INJECTION INTO CERVICAL SPINE N/A 02/02/2023    Procedure: T11/T12 IL HELLEN;  Surgeon: Jassi Pierre MD;  Location: [facility] PAIN MGT;  Service: Pain Management;  Laterality: N/A;    EPIDURAL STEROID INJECTION INTO CERVICAL SPINE N/A 9/16/2024    Procedure: T11/T12 IL HELLEN;  Surgeon: Jassi Pierre MD;  Location: [facility]  PAIN MGT;  Service: Pain Management;  Laterality: N/A;    HEART TRANSPLANT  1993    HERNIA REPAIR Right 1971 approx    Inguinal    HYSTERECTOMY  1983    vag hyst /LSO     IMPLANTATION OF PERMANENT SACRAL NERVE STIMULATOR N/A 06/11/2024    Procedure: INSERTION, NEUROSTIMULATOR, PERMANENT, SACRAL;  Surgeon: Sami Cochran MD;  Location: [facility] OR;  Service: Urology;  Laterality: N/A;    INCISION AND DRAINAGE OF ABSCESS Left 12/24/2021    Procedure: INCISION AND DRAINAGE, ABSCESS;  Surgeon: Joseph Longo MD;  Location: [facility] OR;  Service: General;  Laterality: Left;    INJECTION OF ANESTHETIC AGENT AROUND MEDIAL BRANCH NERVES INNERVATING LUMBAR FACET JOINT Right 10/19/2022    Procedure: Right L4/L5 and L5/S1 MBB;  Surgeon: Jassi Pierre MD;  Location: [facility] PAIN MGT;  Service: Pain Management;  Laterality: Right;    INJECTION OF ANESTHETIC AGENT AROUND MEDIAL BRANCH NERVES INNERVATING LUMBAR FACET JOINT Right 11/09/2022    Procedure: Right L4/L5 and L5/S1 MBB;  Surgeon: Jassi Pierre MD;  Location: [facility] PAIN MGT;  Service: Pain Management;  Laterality: Right;    INJECTION OF ANESTHETIC AGENT AROUND MEDIAL BRANCH NERVES INNERVATING LUMBAR FACET JOINT Left 2/6/2025    Procedure: Left L4-5 and L5-S1 MBB #1 with local;  Surgeon: Jassi Pierre MD;  Location: [facility] PAIN MGT;  Service: Pain Management;  Laterality: Left;    INJECTION OF ANESTHETIC AGENT AROUND MEDIAL BRANCH NERVES INNERVATING LUMBAR FACET JOINT Left 3/19/2025    Procedure: Left L4-5 and L5-S1 MBB #2 with local;  Surgeon: Jassi Pierre MD;  Location: [facility] PAIN MGT;  Service: Pain Management;  Laterality: Left;    INJECTION OF ANESTHETIC AGENT INTO SACROILIAC JOINT Right 08/22/2022    Procedure: Right SIJ Injection Right L5/S1 Facte Injection;  Surgeon: Jassi Pierre MD;  Location: [facility] PAIN MGT;  Service: Pain Management;  Laterality: Right;    INSERTION OF TUNNELED CENTRAL VENOUS CATHETER (CVC) WITH SUBCUTANEOUS PORT N/A 11/09/2021     Procedure: [id number];  Surgeon: Christoph Douglas MD;  Location: [facility] OR;  Service: General;  Laterality: N/A;    RADIOFREQUENCY ABLATION Right 12/5/2024    Procedure: Right L4-5 and L5-S1 RFA;  Surgeon: Jassi Pierre MD;  Location: [facility] PAIN MGT;  Service: Pain Management;  Laterality: Right;    RADIOFREQUENCY THERMOCOAGULATION Right 12/07/2022    Procedure: Right L4/L5 and L5/S1 Lumbar RFA;  Surgeon: Jassi Pierre MD;  Location: [facility] PAIN MGT;  Service: Pain Management;  Laterality: Right;    REMOVAL OF ELECTRODE LEAD OF SACRAL NERVE STIMULATOR N/A 2/18/2025    Procedure: REMOVAL, ELECTRODE LEAD, SACRAL NERVE STIMULATOR;  Surgeon: Sami Cochran MD;  Location: [facility] OR;  Service: Urology;  Laterality: N/A;    REMOVAL OF VASCULAR ACCESS PORT      ROBOT-ASSISTED LAPAROSCOPIC ABDOMINAL SACROCOLPOPEXY N/A 08/10/2023    Procedure: ROBOTIC SACROCOLPOPEXY, ABDOMEN;  Surgeon: PRANAY Villalobos MD;  Location: [facility] OR;  Service: OB/GYN;  Laterality: N/A;    ROBOT-ASSISTED LAPAROSCOPIC OOPHORECTOMY Right 08/10/2023    Procedure: ROBOTIC OOPHORECTOMY;  Surgeon: PRANAY Villalobos MD;  Location: [facility] OR;  Service: OB/GYN;  Laterality: Right;    SENTINEL LYMPH NODE BIOPSY Left 10/12/2021    Procedure: BIOPSY, LYMPH NODE, SENTINEL;  Surgeon: Christoph Douglas MD;  Location: [facility] OR;  Service: General;  Laterality: Left;    TOE SURGERY      XI ROBOTIC URETHROPEXY N/A 08/10/2023    Procedure: XI ROBOTIC URETHROPEXY;  Surgeon: PRANAY Villalobos MD;  Location: [facility] OR;  Service: OB/GYN;  Laterality: N/A;       Review of patient's allergies indicates:   Allergen Reactions    Dobutamine Other (See Comments)     Severe Left sided chest pain after dosage administered for Dobutamine Stress Test; itching    Lisinopril Swelling and Rash    Hydrocodone-acetaminophen Nausea Only    Augmentin [amoxicillin-pot clavulanate] Diarrhea    Zyvox [linezolid] Nausea And Vomiting       Current Facility-Administered  Medications on File Prior to Encounter   Medication    [DISCONTINUED] 0.9%  NaCl infusion (for blood administration)    [DISCONTINUED] acetaminophen suppository 650 mg    [DISCONTINUED] acetaminophen tablet 650 mg    [DISCONTINUED] aluminum-magnesium hydroxide-simethicone 200-200-20 mg/5 mL suspension 30 mL    [DISCONTINUED] aspirin EC tablet 81 mg    [DISCONTINUED] carvediloL tablet 3.125 mg    [DISCONTINUED] cycloSPORINE modified (NEORAL) (NEORAL) capsule 50 mg    [DISCONTINUED] dextrose 50% injection 12.5 g    [DISCONTINUED] dextrose 50% injection 25 g    [DISCONTINUED] glucagon (human recombinant) injection 1 mg    [DISCONTINUED] glucose chewable tablet 16 g    [DISCONTINUED] glucose chewable tablet 24 g    [DISCONTINUED] heparin (porcine) injection 5,000 Units    [DISCONTINUED] hydrALAZINE injection 10 mg    [DISCONTINUED] naloxone 0.4 mg/mL injection 0.02 mg    [DISCONTINUED] NIFEdipine 24 hr tablet 60 mg    [DISCONTINUED] nitroGLYCERIN SL tablet 0.4 mg    [DISCONTINUED] ondansetron injection 4 mg    [DISCONTINUED] pantoprazole EC tablet 40 mg    [DISCONTINUED] polyethylene glycol packet 17 g    [DISCONTINUED] pregabalin capsule 50 mg    [DISCONTINUED] promethazine tablet 25 mg    [DISCONTINUED] sertraline tablet 25 mg    [DISCONTINUED] simethicone chewable tablet 80 mg    [DISCONTINUED] sodium bicarbonate tablet 650 mg    [DISCONTINUED] sodium chloride 0.9% flush 3 mL    [DISCONTINUED] tiZANidine tablet 4 mg    [DISCONTINUED] zolpidem tablet 5 mg     Current Outpatient Medications on File Prior to Encounter   Medication Sig    aspirin (ECOTRIN) 81 MG EC tablet Take 1 tablet (81 mg total) by mouth once daily.    calcitRIOL (ROCALTROL) 0.25 MCG Cap Take 1 capsule (0.25 mcg total) by mouth once daily.    carvediloL (COREG) 3.125 MG tablet Take 1 tablet (3.125 mg total) by mouth 2 (two) times daily with meals.    cycloSPORINE (NEORAL) 100 mg/mL microemulsion solution Take 0.5 mLs (50 mg total) by mouth every 12  (twelve) hours.    [] dextromethorphan-guaiFENesin  mg (MUCINEX DM)  mg per 12 hr tablet Take 1 tablet by mouth 2 (two) times daily. for 10 days    furosemide (LASIX) 40 MG tablet Take 1 tablet (40 mg total) by mouth once daily.    mupirocin (BACTROBAN) 2 % ointment Apply topically 2 (two) times daily.    NIFEdipine (PROCARDIA-XL) 60 MG (OSM) 24 hr tablet Take 1 tablet (60 mg total) by mouth once daily.    nitroGLYCERIN (NITROSTAT) 0.4 MG SL tablet PLACE 1 TABLET UNDER THE TONGUE EVERY 5 MINS AS NEEDED FOR CHEST PAIN FOR UP TO 3 DOSES.IF CONTINUED HEART PAIN/PRESSURE AFTER    ondansetron (ZOFRAN) 4 MG tablet Take 1 tablet (4 mg total) by mouth every 12 (twelve) hours as needed for Nausea.    [Paused] oxyCODONE-acetaminophen (PERCOCET) 5-325 mg per tablet Take 1 tablet by mouth every 4 (four) hours as needed for Pain.    pantoprazole (PROTONIX) 40 MG tablet Take 1 tablet (40 mg total) by mouth once daily.    polyethylene glycol (GLYCOLAX) 17 gram PwPk Take 17 g by mouth once daily.    pregabalin (LYRICA) 50 MG capsule Take 1 capsule (50 mg total) by mouth every evening.    sertraline (ZOLOFT) 25 MG tablet Take 1 tablet (25 mg total) by mouth once daily.    sodium bicarbonate 650 MG tablet Take 1 tablet (650 mg total) by mouth 2 (two) times daily.    [Paused] temazepam (RESTORIL) 30 mg capsule Take 1 capsule (30 mg total) by mouth nightly as needed for Insomnia.    tiZANidine (ZANAFLEX) 2 MG tablet Take 2 tablets (4 mg total) by mouth 2 (two) times a day.    vitamin E 400 UNIT capsule Take 400 Units by mouth once daily.     Family History       Problem Relation (Age of Onset)    Breast cancer Mother, Maternal Grandmother    Cancer Mother (38)    Cataracts Cousin    Heart disease Maternal Grandmother    Hypertension Son          Tobacco Use    Smoking status: Never     Passive exposure: Never    Smokeless tobacco: Never   Substance and Sexual Activity    Alcohol use: Never     Alcohol/week: 0.0  standard drinks of alcohol    Drug use: No    Sexual activity: Not Currently     Partners: Male     Birth control/protection: See Surgical Hx     Review of Systems   Constitutional:  Negative for appetite change and fever.   HENT:  Negative for congestion, rhinorrhea and sore throat.         + for lip swelling/pain with cold sore   Respiratory:  Negative for cough and shortness of breath.    Cardiovascular:  Positive for chest pain. Negative for palpitations and leg swelling.   Gastrointestinal:  Negative for abdominal pain, diarrhea, nausea and vomiting.   Genitourinary:  Negative for dysuria and hematuria.   Skin:  Negative for rash.   Neurological:  Negative for dizziness, syncope and headaches.   Psychiatric/Behavioral:  The patient is nervous/anxious.      Objective:     Vital Signs (Most Recent):  Temp: 98.8 °F (37.1 °C) (05/17/25 0734)  Pulse: 90 (05/17/25 0822)  Resp: 18 (05/17/25 0529)  BP: (!) 150/91 (05/17/25 0745)  SpO2: 98 % (05/17/25 0734) Vital Signs (24h Range):  Temp:  [97.7 °F (36.5 °C)-98.8 °F (37.1 °C)] 98.8 °F (37.1 °C)  Pulse:  [] 90  Resp:  [18-20] 18  SpO2:  [95 %-99 %] 98 %  BP: (121-171)/(71-96) 150/91     Weight: 58.4 kg (128 lb 12 oz)  Body mass index is 23.55 kg/m².     Physical Exam  Vitals reviewed.   Constitutional:       General: She is not in acute distress.  HENT:      Head: Normocephalic and atraumatic.      Nose: Nose normal.      Mouth/Throat:      Mouth: Mucous membranes are moist.      Comments: HSV appearing sore of the pt's right lip, no other ulcers or sores noted.  Eyes:      Conjunctiva/sclera: Conjunctivae normal.      Pupils: Pupils are equal, round, and reactive to light.   Cardiovascular:      Rate and Rhythm: Normal rate and regular rhythm.      Heart sounds: Normal heart sounds.   Pulmonary:      Effort: Pulmonary effort is normal. No respiratory distress.      Breath sounds: Normal breath sounds. No wheezing or rales.   Abdominal:      General: Abdomen is  flat. Bowel sounds are normal. There is no distension.      Palpations: Abdomen is soft.      Tenderness: There is no abdominal tenderness. There is no guarding or rebound.   Musculoskeletal:      Cervical back: Normal range of motion and neck supple.      Right lower leg: No edema.      Left lower leg: No edema.   Skin:     General: Skin is warm and dry.   Neurological:      General: No focal deficit present.      Mental Status: She is alert and oriented to person, place, and time.   Psychiatric:         Mood and Affect: Mood normal.         Behavior: Behavior normal.              CRANIAL NERVES     CN III, IV, VI   Pupils are equal, round, and reactive to light.       Significant Labs: All pertinent labs within the past 24 hours have been reviewed.  Recent Lab Results         05/17/25  0546        Albumin 3.0              ALT 33       Anion Gap 10       AST 50       Baso # 0.02       Basophil % 0.5       BILIRUBIN TOTAL 0.4  Comment: For infants and newborns, interpretation of results should be based   on gestational age, weight and in agreement with clinical   observations.    Premature Infant recommended reference ranges:   0-24 hours:  <8.0 mg/dL   24-48 hours: <12.0 mg/dL   3-5 days:    <15.0 mg/dL   6-29 days:   <15.0 mg/dL       BUN 40       Calcium 8.8       Chloride 106       CO2 25       Creatinine 3.1       eGFR 16  Comment: Estimated GFR calculated using the CKD-EPI creatinine (2021) equation.       Eos # 0.11       Eos % 2.8       Ferritin 208.0       Folate 16.7       Glucose 80       Gran # (ANC) 2.07       Hematocrit 29.3       Hemoglobin 9.3       Immature Grans (Abs) 0.02  Comment: Mild elevation in immature granulocytes is non specific and can be seen in a variety of conditions including stress response, acute inflammation, trauma and pregnancy. Correlation with other laboratory and clinical findings is essential.       Immature Granulocytes 0.5       Iron 86       Iron Saturation 29        Lymph # 1.17       Lymph % 30.3       Magnesium  1.8       MCH 27.8       MCHC 31.7       MCV 88       Mono # 0.47       Mono % 12.2       MPV 9.9       Neut % 53.7       nRBC 0       Phosphorus Level 3.6       Platelet Count 226       Potassium 3.8       PROTEIN TOTAL 7.5       RBC 3.35       RDW 16.2       Sodium 141       TIBC 297       Transferrin 201       Vitamin B12 1,254       WBC 3.86               Significant Imaging: I have reviewed all pertinent imaging results/findings within the past 24 hours.  .

## 2025-05-17 NOTE — ACP (ADVANCE CARE PLANNING)
Advance Care Planning     Date: 05/17/2025  Code Status  In light of the patients advanced and life limiting illness,I engaged the the patient in a voluntary conversation about the patient's preferences for care  at the very end of life. The patient wishes to pursue all life-prolonging measures under those circumstances.  Along those lines, the patient does wish to have CPR or other invasive treatments performed when her heart and/or breathing stops. I communicated to the patient that her status would remain full code.    A total of 10 min was spent on advance care planning, goals of care discussion, emotional support, formulating and communicating prognosis and exploring burden/benefit of various approaches of treatment. This discussion occurred on a fully voluntary basis with the verbal consent of the patient and/or family.     Patient was initially listed with a partial code status this visit.  Patient had stated to another provider that she would not want to be intubated.  However, patient stated very clearly that she would like us to attempt cardiac resuscitation in the event of a repeat cardiac arrest.  After discussing the logistics of this and explaining how a do not intubate status would be incompatible with this, patient decided to change her code status to full code.

## 2025-05-17 NOTE — PLAN OF CARE
"Patient AAOx4, afebrile, and vital signs stable. On RA. Cont' pulse ox on. Patient up with stand-by assist to toilet with walker. C/O chest pain this AM-- see previous note. Troponin collected and pending. EKG- Right bundle branch block and T wave abnormal. Consult to heart transplant. Acyclovir added to regimen for right lip cold sore. Patient reports "feeling much better" and educated on calling when experiencing chest pain or other symptoms. Patient resting in hospital bed. Plan of care discussed and questions encouraged. Bed remains in locked and lowest position with personal items and call light within reach.  "

## 2025-05-17 NOTE — PLAN OF CARE
[facility] - Transplant Stepdown  Initial Discharge Assessment       Primary Care Provider: Elis Wick MD    Admission Diagnosis: Chest pain [R07.9]    Admission Date: 5/17/2025  Expected Discharge Date: 5/21/2025    Transition of Care Barriers: None    Payor: [employer] MANAGED MEDICARE / Plan: "[employer] HMO PPO SPECIAL NEEDS / Product Type: Medicare Advantage /     Extended Emergency Contact Information  Primary Emergency Contact: Elisa Watkins  Mobile Phone: [phone]  Relation: Other  Secondary Emergency Contact: Moy Watkins  Mobile Phone: [phone]  Relation: Healthcare Power of [facility] - [place], [place] - [address]  [address] [address]  Phone: [phone] Fax: [phone]    [facility] [address]  [place] [address]  Phone: [phone] Fax: [phone]    CM obtained discharge planning assessment with patient.  Patient current with [facility].    Initial Assessment (most recent)       Adult Discharge Assessment - 05/17/25 1432          Discharge Assessment    Assessment Type Discharge Planning Assessment     Confirmed/corrected address, phone number and insurance Yes     Confirmed Demographics Correct on Facesheet     Source of Information patient     Communicated RATNA with patient/caregiver Date not available/Unable to determine     Reason For Admission Chest pain     People in Home alone     Do you expect to return to your current living situation? Yes     Do you have help at home or someone to help you manage your care at home? No     Prior to hospitilization cognitive status: Alert/Oriented     Current cognitive status: Alert/Oriented     Walking or Climbing Stairs Difficulty yes     Walking or Climbing Stairs ambulation difficulty, requires equipment     Mobility Management walker     Dressing/Bathing Difficulty yes     Dressing/Bathing bathing difficulty, requires equipment      Dressing/Bathing Management shwer chair     Equipment Currently Used at Home walker, rolling;cane, straight     Readmission within 30 days? Yes     Patient currently being followed by outpatient case management? Unable to determine (comments)     Do you currently have service(s) that help you manage your care at home? Yes     Name and Contact number of agency [facility]     Is the pt/caregiver preference to resume services with current agency Yes     Do you take prescription medications? Yes     Do you have prescription coverage? Yes     Do you have any problems affording any of your prescribed medications? No     Is the patient taking medications as prescribed? yes     Who is going to help you get home at discharge? [facility]     How do you get to doctors appointments? car, drives self     Are you on dialysis? No     Do you take coumadin? No     Transition of Care Barriers None        Physical Activity    On average, how many days per week do you engage in moderate to strenuous exercise (like a brisk walk)? 0 days     On average, how many minutes do you engage in exercise at this level? 0 min        Financial Resource Strain    How hard is it for you to pay for the very basics like food, housing, medical care, and heating? Not hard at all        Housing Stability    In the last 12 months, was there a time when you were not able to pay the mortgage or rent on time? No     At any time in the past 12 months, were you homeless or living in a shelter (including now)? No        Transportation Needs    In the past 12 months, has lack of transportation kept you from medical appointments or from getting medications? No     In the past 12 months, has lack of transportation kept you from meetings, work, or from getting things needed for daily living? No        Food Insecurity    Within the past 12 months, you worried that your food would run out before you got the money to buy more. Never true     Within the past 12 months, the  food you bought just didn't last and you didn't have money to get more. Never true        Stress    Do you feel stress - tense, restless, nervous, or anxious, or unable to sleep at night because your mind is troubled all the time - these days? Only a little        Social Isolation    How often do you feel lonely or isolated from those around you?  Never        Alcohol Use    Q1: How often do you have a drink containing alcohol? Never     Q2: How many drinks containing alcohol do you have on a typical day when you are drinking? Patient does not drink     Q3: How often do you have six or more drinks on one occasion? Never        Utilities    In the past 12 months has the electric, gas, oil, or water company threatened to shut off services in your home? No        Health Literacy    How often do you need to have someone help you when you read instructions, pamphlets, or other written material from your doctor or pharmacy? Never

## 2025-05-17 NOTE — ASSESSMENT & PLAN NOTE
Patient's blood pressure range in the last 24 hours was: BP  Min: 121/71  Max: 171/96.The patient's inpatient anti-hypertensive regimen is listed below:  Current Antihypertensives  carvediloL tablet 3.125 mg, 2 times daily with meals, Oral  NIFEdipine 24 hr tablet 60 mg, Daily, Oral  nitroGLYCERIN SL tablet 0.4 mg, Every 5 min PRN, Sublingual    Plan  - Restart home regimen. Monitor and adjust PRN.

## 2025-05-17 NOTE — H&P
"  [facility] - Transplant [facility] Medicine  History & Physical    Patient Name: Nadia Damon  MRN: [id number]  Patient Class: IP- Inpatient  Admission Date: 5/17/2025  Attending Physician: Hermelindo Steven MD   Primary Care Provider: Elis Wick MD         Patient information was obtained from patient, past medical records, and ER records.     Subjective:     Principal Problem:Chest pain    Chief Complaint: No chief complaint on file.       HPI: Nadia Damon is a 69 y.o. female with HFmrEF s/p transplant in 1993 c/b CAD in transplant heart and placement of PPM due to OHT, CKD 4, hypertension, secondary hyperparathyroidism with osteoporosis, history of breast cancer in 2021 s/p excision and chemotherapy with radiation finished in 2024, history right middle MCA stroke with residual aphasia and weakness, multilevel spinal degenerative disc disease, depression/anxiety, and recent admission due to cardiac arrest with unknown rhythm requiring CPR with 1x round of epi after which LHC was deferred due to renal function and prior negative cardiac PET in 11/2024 and she was discharged on 5/6 then readmitted to [facility] on 05/12 with symptomatic hypotension and evidence of myocardial ischemia as well as GRACE on CKD which was felt t possibly be in setting of anemia at that time. She received transfusions with noted improvement of GRACE and trops. During continued monitoring and evaluation at OSH, pt reported chest pain on 5/14 described as left breast, radiating to back and as the senstation of her "heart pounding hard like if I ran" without pattern of inciting factors and relieved by 2X NTG.  Reportedly occurring ~2x daily and pt reports this pain is similar to prior chronic angina that had been ongoing even before her prior cardiac arrest (although she notes CP was more severe prior to cardiac arrest) and states the pain feels overall differently from post CPR pain. Pt reports that she feels " anxiety/family stressors related to her health and her son's health may be playing a role. Repeat troponins after reoccurence of CP continuing to improve. Pt transferred to [facility] for evaluation of chest pain by transplant service and ongoing evaluation/management.    On admit evaluation, the patient reports she overall does feel stable despite the pain and anxiety. She notes developing a painful blister of her lip but denies blisters, ulcers, or rashes elsewhere as well as sore throat or difficulty swallowing. She otherwise denies fever, URI sxs, cough, dyspnea, palptitations, LE edema, abd pain, diarrhea, nausea, vomiting, dysuria, hematuria, dizziness, syncope.      Past Medical History:   Diagnosis Date    Abdominal wall hernia     CT Renal 6/11/2018---Small fat containing superior ventral abdominal wall hernia at the epicardial pacing lead site.    Abnormal mammogram 10/12/2021    Acute cystitis without hematuria 05/10/2022    Acute ischemic right middle cerebral artery (MCA) stroke 07/20/2024    Adverse drug reaction 11/12/2024    GRACE (acute kidney injury) 11/22/2021    Anxiety     Aphasia 07/19/2024    Arthritis     ZEN HIPS    Breast cancer in female 08/2021    LEFT BREAST    Breast mass, right 11/13/2024    RIGHT BREAST MASS (Problem)      Bronchitis 08/18/2016    Never smoked      C. difficile colitis 11/29/2021    Cellulitis of axilla, left 12/23/2021    Chronic diastolic heart failure 12/16/2021    Chronic kidney disease     stage 4, GFR 15-29 ml/min    Chronic midline low back pain without sciatica 06/18/2018    CKD (chronic kidney disease) stage 4, GFR 15-29 ml/min 04/20/2016    US Retro   5/16/2018---Mild cortical thinning and increased cortical echogenicity.  Findings can be seen with medical renal disease.  8/31/216--- Echogenic kidneys with diffuse cortical thinning suggesting  medical renal disease. 2 complex right renal cortical cysts.      Closed nondisplaced fracture of distal phalanx of left  great toe with routine healing 10/22/2018    Coronary artery disease 1993    heart transplant    COVID-19 in immunocompromised patient 02/26/2024    Cystitis 05/10/2022    Depression     Encounter for blood transfusion     Encounter for palliative care 10/14/2024    Fibromyalgia     on Lyrica    Heart failure     native heart cardiomyopathy    Heart transplanted 1993    due to cardiomyopathy    History of hyperparathyroidism; Hyperparathyroidism, secondary renal     PT DENIES    Hypertension     Immune disorder     anti rejection meds    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    Impaired mobility 07/28/2022    Iron deficiency anemia 08/15/2017    Kidney stones     passed per pt    Obesity     Obesity (BMI 30.0-34.9) 07/22/2019    Other osteoporosis without current pathological fracture 08/30/2019    Other pancytopenia 05/06/2024    Parotitis, acute 09/19/2023    Pharyngitis 01/09/2019    Pneumonia due to infectious organism 03/14/2024    PONV (postoperative nausea and vomiting)     Severe sepsis 11/22/2021    Shingles 2003 approx    left leg    Stage 4 chronic kidney disease 11/22/2021    Subclinical hypothyroidism 06/16/2023    Thrombocytopenia, unspecified 11/29/2021    Trouble in sleeping     Unresponsiveness 11/13/2024    Urinary incontinence        Past Surgical History:   Procedure Laterality Date    bladder implant Right 06/11/2024    BLADDER SURGERY  2015 approx    mesh - Dr Everett then 2nd reconstructive sx Dr Onofre    BREAST BIOPSY Bilateral     NEGATIVE    BREAST BIOPSY Right 10/31/2022    benign    BREAST LUMPECTOMY Left 2021    BREAST SURGERY Left 09/28/2015    Bx - benign    BREAST SURGERY Right 12/2015    Bx benign    CARDIAC PACEMAKER REMOVAL Left 06/26/2014    Pacer defirillator removed. Put in 1993 aat time of heart transplant    CARPAL TUNNEL RELEASE Left 03/03/2015    Dr. Hall    COLONOSCOPY N/A 02/25/2021    Procedure: COLONOSCOPY;  Surgeon: Freida Ramirez MD;   Location: [facility] ENDO;  Service: Endoscopy;  Laterality: N/A;    CYSTOCELE REPAIR      Twice with mesh removal    ECHOCARDIOGRAM,TRANSESOPHAGEAL N/A 4/26/2025    Procedure: Transesophageal echo (AYAAN) intra-procedure log documentation;  Surgeon: Barron Chinchilla MD;  Location: [address];  Service: Cardiothoracic;  Laterality: N/A;    EPIDURAL STEROID INJECTION INTO CERVICAL SPINE N/A 02/02/2023    Procedure: T11/T12 IL HELLEN;  Surgeon: Jassi Pierre MD;  Location: [facility] PAIN MGT;  Service: Pain Management;  Laterality: N/A;    EPIDURAL STEROID INJECTION INTO CERVICAL SPINE N/A 9/16/2024    Procedure: T11/T12 IL HELLEN;  Surgeon: Jassi Pierre MD;  Location: [facility] PAIN MGT;  Service: Pain Management;  Laterality: N/A;    HEART TRANSPLANT  1993    HERNIA REPAIR Right 1971 approx    Inguinal    HYSTERECTOMY  1983    vag hyst /LSO     IMPLANTATION OF PERMANENT SACRAL NERVE STIMULATOR N/A 06/11/2024    Procedure: INSERTION, NEUROSTIMULATOR, PERMANENT, SACRAL;  Surgeon: Sami Cochran MD;  Location: [facility] OR;  Service: Urology;  Laterality: N/A;    INCISION AND DRAINAGE OF ABSCESS Left 12/24/2021    Procedure: INCISION AND DRAINAGE, ABSCESS;  Surgeon: Joseph Longo MD;  Location: [facility] OR;  Service: General;  Laterality: Left;    INJECTION OF ANESTHETIC AGENT AROUND MEDIAL BRANCH NERVES INNERVATING LUMBAR FACET JOINT Right 10/19/2022    Procedure: Right L4/L5 and L5/S1 MBB;  Surgeon: Jassi Pierre MD;  Location: [facility] PAIN MGT;  Service: Pain Management;  Laterality: Right;    INJECTION OF ANESTHETIC AGENT AROUND MEDIAL BRANCH NERVES INNERVATING LUMBAR FACET JOINT Right 11/09/2022    Procedure: Right L4/L5 and L5/S1 MBB;  Surgeon: Jassi Pierre MD;  Location: [facility] PAIN MGT;  Service: Pain Management;  Laterality: Right;    INJECTION OF ANESTHETIC AGENT AROUND MEDIAL BRANCH NERVES INNERVATING LUMBAR FACET JOINT Left 2/6/2025    Procedure: Left L4-5 and L5-S1 MBB #1 with local;  Surgeon: Ignacio  MD Jassi;  Location: [facility] PAIN MGT;  Service: Pain Management;  Laterality: Left;    INJECTION OF ANESTHETIC AGENT AROUND MEDIAL BRANCH NERVES INNERVATING LUMBAR FACET JOINT Left 3/19/2025    Procedure: Left L4-5 and L5-S1 MBB #2 with local;  Surgeon: Jassi Pierre MD;  Location: [facility] PAIN MGT;  Service: Pain Management;  Laterality: Left;    INJECTION OF ANESTHETIC AGENT INTO SACROILIAC JOINT Right 08/22/2022    Procedure: Right SIJ Injection Right L5/S1 Facte Injection;  Surgeon: Jassi Pierre MD;  Location: [facility] PAIN MGT;  Service: Pain Management;  Laterality: Right;    INSERTION OF TUNNELED CENTRAL VENOUS CATHETER (CVC) WITH SUBCUTANEOUS PORT N/A 11/09/2021    Procedure: [id number];  Surgeon: Christoph Douglas MD;  Location: [facility] OR;  Service: General;  Laterality: N/A;    RADIOFREQUENCY ABLATION Right 12/5/2024    Procedure: Right L4-5 and L5-S1 RFA;  Surgeon: Jassi Pierre MD;  Location: [facility] PAIN MGT;  Service: Pain Management;  Laterality: Right;    RADIOFREQUENCY THERMOCOAGULATION Right 12/07/2022    Procedure: Right L4/L5 and L5/S1 Lumbar RFA;  Surgeon: Jassi Pierre MD;  Location: [facility] PAIN MGT;  Service: Pain Management;  Laterality: Right;    REMOVAL OF ELECTRODE LEAD OF SACRAL NERVE STIMULATOR N/A 2/18/2025    Procedure: REMOVAL, ELECTRODE LEAD, SACRAL NERVE STIMULATOR;  Surgeon: Sami Cochran MD;  Location: [facility] OR;  Service: Urology;  Laterality: N/A;    REMOVAL OF VASCULAR ACCESS PORT      ROBOT-ASSISTED LAPAROSCOPIC ABDOMINAL SACROCOLPOPEXY N/A 08/10/2023    Procedure: ROBOTIC SACROCOLPOPEXY, ABDOMEN;  Surgeon: PRANAY Villalobos MD;  Location: [facility] OR;  Service: OB/GYN;  Laterality: N/A;    ROBOT-ASSISTED LAPAROSCOPIC OOPHORECTOMY Right 08/10/2023    Procedure: ROBOTIC OOPHORECTOMY;  Surgeon: PRANAY Villalobos MD;  Location: [facility] OR;  Service: OB/GYN;  Laterality: Right;    SENTINEL LYMPH NODE BIOPSY Left 10/12/2021    Procedure: BIOPSY, LYMPH NODE,  SENTINEL;  Surgeon: Christoph Douglas MD;  Location: [facility] OR;  Service: General;  Laterality: Left;    TOE SURGERY      XI ROBOTIC URETHROPEXY N/A 08/10/2023    Procedure: XI ROBOTIC URETHROPEXY;  Surgeon: PRANAY Villalobos MD;  Location: [facility] OR;  Service: OB/GYN;  Laterality: N/A;       Review of patient's allergies indicates:   Allergen Reactions    Dobutamine Other (See Comments)     Severe Left sided chest pain after dosage administered for Dobutamine Stress Test; itching    Lisinopril Swelling and Rash    Hydrocodone-acetaminophen Nausea Only    Augmentin [amoxicillin-pot clavulanate] Diarrhea    Zyvox [linezolid] Nausea And Vomiting       Current Facility-Administered Medications on File Prior to Encounter   Medication    [DISCONTINUED] 0.9%  NaCl infusion (for blood administration)    [DISCONTINUED] acetaminophen suppository 650 mg    [DISCONTINUED] acetaminophen tablet 650 mg    [DISCONTINUED] aluminum-magnesium hydroxide-simethicone 200-200-20 mg/5 mL suspension 30 mL    [DISCONTINUED] aspirin EC tablet 81 mg    [DISCONTINUED] carvediloL tablet 3.125 mg    [DISCONTINUED] cycloSPORINE modified (NEORAL) (NEORAL) capsule 50 mg    [DISCONTINUED] dextrose 50% injection 12.5 g    [DISCONTINUED] dextrose 50% injection 25 g    [DISCONTINUED] glucagon (human recombinant) injection 1 mg    [DISCONTINUED] glucose chewable tablet 16 g    [DISCONTINUED] glucose chewable tablet 24 g    [DISCONTINUED] heparin (porcine) injection 5,000 Units    [DISCONTINUED] hydrALAZINE injection 10 mg    [DISCONTINUED] naloxone 0.4 mg/mL injection 0.02 mg    [DISCONTINUED] NIFEdipine 24 hr tablet 60 mg    [DISCONTINUED] nitroGLYCERIN SL tablet 0.4 mg    [DISCONTINUED] ondansetron injection 4 mg    [DISCONTINUED] pantoprazole EC tablet 40 mg    [DISCONTINUED] polyethylene glycol packet 17 g    [DISCONTINUED] pregabalin capsule 50 mg    [DISCONTINUED] promethazine tablet 25 mg    [DISCONTINUED] sertraline tablet 25 mg    [DISCONTINUED]  simethicone chewable tablet 80 mg    [DISCONTINUED] sodium bicarbonate tablet 650 mg    [DISCONTINUED] sodium chloride 0.9% flush 3 mL    [DISCONTINUED] tiZANidine tablet 4 mg    [DISCONTINUED] zolpidem tablet 5 mg     Current Outpatient Medications on File Prior to Encounter   Medication Sig    aspirin (ECOTRIN) 81 MG EC tablet Take 1 tablet (81 mg total) by mouth once daily.    calcitRIOL (ROCALTROL) 0.25 MCG Cap Take 1 capsule (0.25 mcg total) by mouth once daily.    carvediloL (COREG) 3.125 MG tablet Take 1 tablet (3.125 mg total) by mouth 2 (two) times daily with meals.    cycloSPORINE (NEORAL) 100 mg/mL microemulsion solution Take 0.5 mLs (50 mg total) by mouth every 12 (twelve) hours.    [] dextromethorphan-guaiFENesin  mg (MUCINEX DM)  mg per 12 hr tablet Take 1 tablet by mouth 2 (two) times daily. for 10 days    furosemide (LASIX) 40 MG tablet Take 1 tablet (40 mg total) by mouth once daily.    mupirocin (BACTROBAN) 2 % ointment Apply topically 2 (two) times daily.    NIFEdipine (PROCARDIA-XL) 60 MG (OSM) 24 hr tablet Take 1 tablet (60 mg total) by mouth once daily.    nitroGLYCERIN (NITROSTAT) 0.4 MG SL tablet PLACE 1 TABLET UNDER THE TONGUE EVERY 5 MINS AS NEEDED FOR CHEST PAIN FOR UP TO 3 DOSES.IF CONTINUED HEART PAIN/PRESSURE AFTER    ondansetron (ZOFRAN) 4 MG tablet Take 1 tablet (4 mg total) by mouth every 12 (twelve) hours as needed for Nausea.    [Paused] oxyCODONE-acetaminophen (PERCOCET) 5-325 mg per tablet Take 1 tablet by mouth every 4 (four) hours as needed for Pain.    pantoprazole (PROTONIX) 40 MG tablet Take 1 tablet (40 mg total) by mouth once daily.    polyethylene glycol (GLYCOLAX) 17 gram PwPk Take 17 g by mouth once daily.    pregabalin (LYRICA) 50 MG capsule Take 1 capsule (50 mg total) by mouth every evening.    sertraline (ZOLOFT) 25 MG tablet Take 1 tablet (25 mg total) by mouth once daily.    sodium bicarbonate 650 MG tablet Take 1 tablet (650 mg total) by mouth  2 (two) times daily.    [Paused] temazepam (RESTORIL) 30 mg capsule Take 1 capsule (30 mg total) by mouth nightly as needed for Insomnia.    tiZANidine (ZANAFLEX) 2 MG tablet Take 2 tablets (4 mg total) by mouth 2 (two) times a day.    vitamin E 400 UNIT capsule Take 400 Units by mouth once daily.     Family History       Problem Relation (Age of Onset)    Breast cancer Mother, Maternal Grandmother    Cancer Mother (38)    Cataracts Cousin    Heart disease Maternal Grandmother    Hypertension Son          Tobacco Use    Smoking status: Never     Passive exposure: Never    Smokeless tobacco: Never   Substance and Sexual Activity    Alcohol use: Never     Alcohol/week: 0.0 standard drinks of alcohol    Drug use: No    Sexual activity: Not Currently     Partners: Male     Birth control/protection: See Surgical Hx     Review of Systems   Constitutional:  Negative for appetite change and fever.   HENT:  Negative for congestion, rhinorrhea and sore throat.         + for lip swelling/pain with cold sore   Respiratory:  Negative for cough and shortness of breath.    Cardiovascular:  Positive for chest pain. Negative for palpitations and leg swelling.   Gastrointestinal:  Negative for abdominal pain, diarrhea, nausea and vomiting.   Genitourinary:  Negative for dysuria and hematuria.   Skin:  Negative for rash.   Neurological:  Negative for dizziness, syncope and headaches.   Psychiatric/Behavioral:  The patient is nervous/anxious.      Objective:     Vital Signs (Most Recent):  Temp: 98.8 °F (37.1 °C) (05/17/25 0734)  Pulse: 90 (05/17/25 0822)  Resp: 18 (05/17/25 0529)  BP: (!) 150/91 (05/17/25 0745)  SpO2: 98 % (05/17/25 0734) Vital Signs (24h Range):  Temp:  [97.7 °F (36.5 °C)-98.8 °F (37.1 °C)] 98.8 °F (37.1 °C)  Pulse:  [] 90  Resp:  [18-20] 18  SpO2:  [95 %-99 %] 98 %  BP: (121-171)/(71-96) 150/91     Weight: 58.4 kg (128 lb 12 oz)  Body mass index is 23.55 kg/m².     Physical Exam  Vitals reviewed.    Constitutional:       General: She is not in acute distress.  HENT:      Head: Normocephalic and atraumatic.      Nose: Nose normal.      Mouth/Throat:      Mouth: Mucous membranes are moist.      Comments: HSV appearing sore of the pt's right lip, no other ulcers or sores noted.  Eyes:      Conjunctiva/sclera: Conjunctivae normal.      Pupils: Pupils are equal, round, and reactive to light.   Cardiovascular:      Rate and Rhythm: Normal rate and regular rhythm.      Heart sounds: Normal heart sounds.   Pulmonary:      Effort: Pulmonary effort is normal. No respiratory distress.      Breath sounds: Normal breath sounds. No wheezing or rales.   Abdominal:      General: Abdomen is flat. Bowel sounds are normal. There is no distension.      Palpations: Abdomen is soft.      Tenderness: There is no abdominal tenderness. There is no guarding or rebound.   Musculoskeletal:      Cervical back: Normal range of motion and neck supple.      Right lower leg: No edema.      Left lower leg: No edema.   Skin:     General: Skin is warm and dry.   Neurological:      General: No focal deficit present.      Mental Status: She is alert and oriented to person, place, and time.   Psychiatric:         Mood and Affect: Mood normal.         Behavior: Behavior normal.              CRANIAL NERVES     CN III, IV, VI   Pupils are equal, round, and reactive to light.       Significant Labs: All pertinent labs within the past 24 hours have been reviewed.  Recent Lab Results         05/17/25  0546        Albumin 3.0              ALT 33       Anion Gap 10       AST 50       Baso # 0.02       Basophil % 0.5       BILIRUBIN TOTAL 0.4  Comment: For infants and newborns, interpretation of results should be based   on gestational age, weight and in agreement with clinical   observations.    Premature Infant recommended reference ranges:   0-24 hours:  <8.0 mg/dL   24-48 hours: <12.0 mg/dL   3-5 days:    <15.0 mg/dL   6-29 days:   <15.0 mg/dL        BUN 40       Calcium 8.8       Chloride 106       CO2 25       Creatinine 3.1       eGFR 16  Comment: Estimated GFR calculated using the CKD-EPI creatinine (2021) equation.       Eos # 0.11       Eos % 2.8       Ferritin 208.0       Folate 16.7       Glucose 80       Gran # (ANC) 2.07       Hematocrit 29.3       Hemoglobin 9.3       Immature Grans (Abs) 0.02  Comment: Mild elevation in immature granulocytes is non specific and can be seen in a variety of conditions including stress response, acute inflammation, trauma and pregnancy. Correlation with other laboratory and clinical findings is essential.       Immature Granulocytes 0.5       Iron 86       Iron Saturation 29       Lymph # 1.17       Lymph % 30.3       Magnesium  1.8       MCH 27.8       MCHC 31.7       MCV 88       Mono # 0.47       Mono % 12.2       MPV 9.9       Neut % 53.7       nRBC 0       Phosphorus Level 3.6       Platelet Count 226       Potassium 3.8       PROTEIN TOTAL 7.5       RBC 3.35       RDW 16.2       Sodium 141       TIBC 297       Transferrin 201       Vitamin B12 1,254       WBC 3.86               Significant Imaging: I have reviewed all pertinent imaging results/findings within the past 24 hours.  .  Assessment/Plan:     Assessment & Plan  Chest pain  Recurrent chest pain on 5/14 in pt with complex Hx including HFmrEF s/p transplant in 1993 c/b CAD in transplant heart and placement of PPM due to OHT and recent admission due to cardiac arrest with unknown rhythm requiring CPR with 1x round of epi after which LHC was deferred due to renal function and prior negative cardiac PET in 11/2024 and she was discharged on 5/6 then readmitted to [facility] on 05/12 with symptomatic hypotension and evidence of myocardial ischemia as well as GRACE on CKD which was felt to possibly be in setting of anemia/demand ischemia due to that at that time. CP is similar to prior chronic pre-cardiac arrest chest pain per pt and is relieved by NTG.  Mildly elevated troponins but downtrended with continued improvement of anemia and continued overall VS/clinical stability however due to persistent of pain, pt transferred to [facility] for evaluation with transplant cardiology service.    Plan  - Consult transplant cardiology, follow up recs.  - Monitor on continuous cardiac monitoring and pulse ox. PRN EKG/trop if chest pain or other concerns.  - Continue pt's other chronic meds.    History of heart transplant  Discussion of current presentation and hx of transplant as in chest pain problem as well as plan.    Acute kidney injury superimposed on CKD  Improving, likely in setting of anemia. Most recent creatinine and eGFR are listed below.  Recent Labs     05/15/25  0443 05/16/25  0504 05/17/25  0546   CREATININE 3.6* 3.4* 3.1*   EGFRNORACEVR 13* 14* 16*      Plan  - GRACE is improving  - Avoid nephrotoxins and renally dose meds for GFR listed above  - Monitor urine output, serial BMP, and adjust therapy as needed  HTN (hypertension)  Patient's blood pressure range in the last 24 hours was: BP  Min: 121/71  Max: 171/96.The patient's inpatient anti-hypertensive regimen is listed below:  Current Antihypertensives  carvediloL tablet 3.125 mg, 2 times daily with meals, Oral  NIFEdipine 24 hr tablet 60 mg, Daily, Oral  nitroGLYCERIN SL tablet 0.4 mg, Every 5 min PRN, Sublingual    Plan  - Restart home regimen. Monitor and adjust PRN.    Depression with anxiety  Plan  - Continue zoloft.  GERD (gastroesophageal reflux disease)  Plan  - Continue PPI.  HSV (herpes simplex virus) infection  Appears limited to oral location at this time.    Plan  - Start acylovir.    Normocytic anemia  Improved s/p transfusions, pt denying any active sxs of bleeding. Most recent hemoglobin and hematocrit are listed below.  Recent Labs     05/15/25  0443 05/16/25  0504 05/17/25  0546   HGB 9.0* 8.7* 9.3*   HCT 27.7* 27.0* 29.3*     Plan  - Monitor serial CBC: Daily  - Order iron studies and vitamin  levels for evaluation.  - Transfuse PRBC if patient becomes hemodynamically unstable, symptomatic or H/H drops below 7/21.  VTE Risk Mitigation (From admission, onward)           Ordered     heparin (porcine) injection 5,000 Units  Every 8 hours         05/17/25 0227     IP VTE HIGH RISK PATIENT  Once         05/17/25 0222     Place sequential compression device  Until discontinued         05/17/25 0222                       Tessa Salgado DO  Department of Hospital Medicine  [facility] - Transplant Stepdown

## 2025-05-17 NOTE — HPI
"Nadia Damon is a 69 y.o. female with HFmrEF s/p transplant in 1993 c/b CAD in transplant heart and placement of PPM due to OHT, CKD 4, hypertension, secondary hyperparathyroidism with osteoporosis, history of breast cancer in 2021 s/p excision and chemotherapy with radiation finished in 2024, history right middle MCA stroke with residual aphasia and weakness, multilevel spinal degenerative disc disease, depression/anxiety, and recent admission due to cardiac arrest with unknown rhythm requiring CPR with 1x round of epi after which LHC was deferred due to renal function and prior negative cardiac PET in 11/2024 and she was discharged on 5/6 then readmitted to [facility] on 05/12 with symptomatic hypotension and evidence of myocardial ischemia as well as GRACE on CKD which was felt t possibly be in setting of anemia at that time. She received transfusions with noted improvement of GRACE and trops. During continued monitoring and evaluation at OSH, pt reported chest pain on 5/14 described as left breast, radiating to back and as the senstation of her "heart pounding hard like if I ran" without pattern of inciting factors and relieved by 2X NTG.  Reportedly occurring ~2x daily and pt reports this pain is similar to prior chronic angina that had been ongoing even before her prior cardiac arrest (although she notes CP was more severe prior to cardiac arrest) and states the pain feels overall differently from post CPR pain. Pt reports that she feels anxiety/family stressors related to her health and her son's health may be playing a role. Repeat troponins after reoccurence of CP continuing to improve. Pt transferred to [facility] for evaluation of chest pain by transplant service and ongoing evaluation/management.    On admit evaluation, the patient reports she overall does feel stable despite the pain and anxiety. She notes developing a painful blister of her lip but denies blisters, ulcers, or rashes elsewhere as well as " sore throat or difficulty swallowing. She otherwise denies fever, URI sxs, cough, dyspnea, palptitations, LE edema, abd pain, diarrhea, nausea, vomiting, dysuria, hematuria, dizziness, syncope.

## 2025-05-17 NOTE — ASSESSMENT & PLAN NOTE
Improving, likely in setting of anemia. Most recent creatinine and eGFR are listed below.  Recent Labs     05/15/25  0443 05/16/25  0504 05/17/25  0546   CREATININE 3.6* 3.4* 3.1*   EGFRNORACEVR 13* 14* 16*      Plan  - GRACE is improving  - Avoid nephrotoxins and renally dose meds for GFR listed above  - Monitor urine output, serial BMP, and adjust therapy as needed

## 2025-05-17 NOTE — ASSESSMENT & PLAN NOTE
Discussion of current presentation and hx of transplant as in chest pain problem as well as plan.

## 2025-05-17 NOTE — ASSESSMENT & PLAN NOTE
Improved s/p transfusions, pt denying any active sxs of bleeding. Most recent hemoglobin and hematocrit are listed below.  Recent Labs     05/15/25  0443 05/16/25  0504 05/17/25  0546   HGB 9.0* 8.7* 9.3*   HCT 27.7* 27.0* 29.3*     Plan  - Monitor serial CBC: Daily  - Order iron studies and vitamin levels for evaluation.  - Transfuse PRBC if patient becomes hemodynamically unstable, symptomatic or H/H drops below 7/21.

## 2025-05-17 NOTE — NURSING
Patient arrived via [facility] stretcher, from [facility] to [facility] [address]. Patient ambulated to restroom independently with walker. Placed on cardiac monitor (93 bpm with bundle branch block); no complaints of chest pain at this time. New peripheral IV placed to right forearm (previous PIV out on the way from [place]). Vital signs stable. See chart for assessment data.

## 2025-05-17 NOTE — PROGRESS NOTES
0735: Patient C/O chest pain radiating to back and reports started about 10 minutes ago while laying in bed. x1 nitroglycerin given and PRN tizanidine. EKG ordered. MD Steven notified at this time and troponin level ordered.     0745: Nitroglycerin x1 given-- see MAR for details. Vital signs documented per flowsheets. EKG completed.     0750: Patient reports chest pain has subsided at this time and is laying comfortably in bed. Pt told to call if symptoms begin again. MD lopez.

## 2025-05-18 VITALS
HEIGHT: 62 IN | RESPIRATION RATE: 18 BRPM | BODY MASS INDEX: 23.69 KG/M2 | OXYGEN SATURATION: 100 % | SYSTOLIC BLOOD PRESSURE: 152 MMHG | DIASTOLIC BLOOD PRESSURE: 85 MMHG | TEMPERATURE: 97 F | WEIGHT: 128.75 LBS | HEART RATE: 86 BPM

## 2025-05-18 PROBLEM — N17.9 ACUTE KIDNEY INJURY SUPERIMPOSED ON CKD: Status: RESOLVED | Noted: 2025-05-17 | Resolved: 2025-05-18

## 2025-05-18 PROBLEM — B00.9 HSV (HERPES SIMPLEX VIRUS) INFECTION: Status: RESOLVED | Noted: 2025-05-17 | Resolved: 2025-05-18

## 2025-05-18 PROBLEM — N18.9 ACUTE KIDNEY INJURY SUPERIMPOSED ON CKD: Status: RESOLVED | Noted: 2025-05-17 | Resolved: 2025-05-18

## 2025-05-18 LAB
ABSOLUTE EOSINOPHIL (OHS): 0.09 K/UL
ABSOLUTE MONOCYTE (OHS): 0.47 K/UL (ref 0.3–1)
ABSOLUTE NEUTROPHIL COUNT (OHS): 1.45 K/UL (ref 1.8–7.7)
ALBUMIN SERPL BCP-MCNC: 2.8 G/DL (ref 3.5–5.2)
ALP SERPL-CCNC: 165 UNIT/L (ref 40–150)
ALT SERPL W/O P-5'-P-CCNC: 28 UNIT/L (ref 10–44)
ANION GAP (OHS): 9 MMOL/L (ref 8–16)
AST SERPL-CCNC: 39 UNIT/L (ref 11–45)
BACTERIA UR CULT: ABNORMAL
BASOPHILS # BLD AUTO: 0.03 K/UL
BASOPHILS NFR BLD AUTO: 0.9 %
BILIRUB SERPL-MCNC: 0.4 MG/DL (ref 0.1–1)
BUN SERPL-MCNC: 36 MG/DL (ref 8–23)
CALCIUM SERPL-MCNC: 8.8 MG/DL (ref 8.7–10.5)
CHLORIDE SERPL-SCNC: 107 MMOL/L (ref 95–110)
CO2 SERPL-SCNC: 24 MMOL/L (ref 23–29)
CREAT SERPL-MCNC: 2.8 MG/DL (ref 0.5–1.4)
ERYTHROCYTE [DISTWIDTH] IN BLOOD BY AUTOMATED COUNT: 16.4 % (ref 11.5–14.5)
GFR SERPLBLD CREATININE-BSD FMLA CKD-EPI: 18 ML/MIN/1.73/M2
GLUCOSE SERPL-MCNC: 77 MG/DL (ref 70–110)
HCT VFR BLD AUTO: 29 % (ref 37–48.5)
HGB BLD-MCNC: 9.3 GM/DL (ref 12–16)
IMM GRANULOCYTES # BLD AUTO: 0.01 K/UL (ref 0–0.04)
IMM GRANULOCYTES NFR BLD AUTO: 0.3 % (ref 0–0.5)
LYMPHOCYTES # BLD AUTO: 1.19 K/UL (ref 1–4.8)
MAGNESIUM SERPL-MCNC: 1.9 MG/DL (ref 1.6–2.6)
MCH RBC QN AUTO: 28.4 PG (ref 27–31)
MCHC RBC AUTO-ENTMCNC: 32.1 G/DL (ref 32–36)
MCV RBC AUTO: 88 FL (ref 82–98)
NUCLEATED RBC (/100WBC) (OHS): 0 /100 WBC
PHOSPHATE SERPL-MCNC: 3.9 MG/DL (ref 2.7–4.5)
PLATELET # BLD AUTO: 202 K/UL (ref 150–450)
PMV BLD AUTO: 9.9 FL (ref 9.2–12.9)
POTASSIUM SERPL-SCNC: 4.6 MMOL/L (ref 3.5–5.1)
PROT SERPL-MCNC: 7.2 GM/DL (ref 6–8.4)
RBC # BLD AUTO: 3.28 M/UL (ref 4–5.4)
RELATIVE EOSINOPHIL (OHS): 2.8 %
RELATIVE LYMPHOCYTE (OHS): 36.7 % (ref 18–48)
RELATIVE MONOCYTE (OHS): 14.5 % (ref 4–15)
RELATIVE NEUTROPHIL (OHS): 44.8 % (ref 38–73)
SODIUM SERPL-SCNC: 140 MMOL/L (ref 136–145)
WBC # BLD AUTO: 3.24 K/UL (ref 3.9–12.7)

## 2025-05-18 PROCEDURE — 63600175 PHARM REV CODE 636 W HCPCS: Mod: HCNC | Performed by: STUDENT IN AN ORGANIZED HEALTH CARE EDUCATION/TRAINING PROGRAM

## 2025-05-18 PROCEDURE — 36415 COLL VENOUS BLD VENIPUNCTURE: CPT | Mod: HCNC | Performed by: STUDENT IN AN ORGANIZED HEALTH CARE EDUCATION/TRAINING PROGRAM

## 2025-05-18 PROCEDURE — 25000003 PHARM REV CODE 250: Mod: HCNC | Performed by: STUDENT IN AN ORGANIZED HEALTH CARE EDUCATION/TRAINING PROGRAM

## 2025-05-18 PROCEDURE — 80053 COMPREHEN METABOLIC PANEL: CPT | Mod: HCNC | Performed by: STUDENT IN AN ORGANIZED HEALTH CARE EDUCATION/TRAINING PROGRAM

## 2025-05-18 PROCEDURE — 83735 ASSAY OF MAGNESIUM: CPT | Mod: HCNC | Performed by: STUDENT IN AN ORGANIZED HEALTH CARE EDUCATION/TRAINING PROGRAM

## 2025-05-18 PROCEDURE — 84100 ASSAY OF PHOSPHORUS: CPT | Mod: HCNC | Performed by: STUDENT IN AN ORGANIZED HEALTH CARE EDUCATION/TRAINING PROGRAM

## 2025-05-18 PROCEDURE — 85025 COMPLETE CBC W/AUTO DIFF WBC: CPT | Mod: HCNC | Performed by: STUDENT IN AN ORGANIZED HEALTH CARE EDUCATION/TRAINING PROGRAM

## 2025-05-18 RX ORDER — FUROSEMIDE 40 MG/1
20 TABLET ORAL DAILY
Qty: 30 TABLET | Refills: 11 | Status: SHIPPED | OUTPATIENT
Start: 2025-05-18 | End: 2026-05-13

## 2025-05-18 RX ORDER — FUROSEMIDE 40 MG/1
40 TABLET ORAL DAILY
Status: DISCONTINUED | OUTPATIENT
Start: 2025-05-18 | End: 2025-05-18 | Stop reason: HOSPADM

## 2025-05-18 RX ADMIN — TIZANIDINE 4 MG: 2 TABLET ORAL at 09:05

## 2025-05-18 RX ADMIN — CYCLOSPORINE 50 MG: 25 CAPSULE, LIQUID FILLED ORAL at 08:05

## 2025-05-18 RX ADMIN — FUROSEMIDE 40 MG: 40 TABLET ORAL at 09:05

## 2025-05-18 RX ADMIN — SODIUM BICARBONATE 650 MG TABLET 650 MG: at 08:05

## 2025-05-18 RX ADMIN — ACYCLOVIR 200 MG: 200 CAPSULE ORAL at 03:05

## 2025-05-18 RX ADMIN — PANTOPRAZOLE SODIUM 40 MG: 40 TABLET, DELAYED RELEASE ORAL at 08:05

## 2025-05-18 RX ADMIN — CARVEDILOL 3.12 MG: 3.12 TABLET, FILM COATED ORAL at 08:05

## 2025-05-18 RX ADMIN — HEPARIN SODIUM 5000 UNITS: 5000 INJECTION INTRAVENOUS; SUBCUTANEOUS at 06:05

## 2025-05-18 RX ADMIN — ACYCLOVIR 200 MG: 200 CAPSULE ORAL at 08:05

## 2025-05-18 RX ADMIN — NIFEDIPINE 60 MG: 30 TABLET, FILM COATED, EXTENDED RELEASE ORAL at 08:05

## 2025-05-18 RX ADMIN — ACETAMINOPHEN 650 MG: 325 TABLET ORAL at 02:05

## 2025-05-18 RX ADMIN — SERTRALINE HYDROCHLORIDE 25 MG: 25 TABLET ORAL at 08:05

## 2025-05-18 RX ADMIN — CARVEDILOL 3.12 MG: 3.12 TABLET, FILM COATED ORAL at 03:05

## 2025-05-18 RX ADMIN — ASPIRIN 81 MG: 81 TABLET, COATED ORAL at 08:05

## 2025-05-18 NOTE — PLAN OF CARE
05/18/25 1300   Discharge Plan   Discharge Plan A Assisted Living;Home Health   Discharge Plan B Assisted Living       Patient currently lives at [facility] and current with [facility] home [facility].      Chelsea Sol RN  [facility]  - [facility]  [phone]

## 2025-05-18 NOTE — HOSPITAL COURSE
Ms. 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  Patient was just admitted for cardiac arrest and underwent CPR with 1 round of epi.  Rhythm unknown at that time.  Discharged on 05/06.  Readmitted to [facility] on 05/12 with symptomatic hypotension and evidence of myocardial ischemia.  She was anemic at that time.  Her creatinine and troponin I levels improved following transfusion of PRBCs.  Transferred back to us for evaluation by heart transplant service.      Patient continues to have intermittent chest pain.  Unclear if this is related to cardiac issue or if pain is in chest wall and related to recent CPR.  Hemoglobin has been stable here without evidence of bleeding.  Creatinine returned to baseline.  Patient evaluated by heart transplant service.  No additional workup was recommended.  Patient stable for discharge with outpatient follow-up.  Following goals of care discussion, it was revealed that patient has mostly accepted that her transplant in heart is near the end of its life.  She would like her code status to be DNR.  Other family members have been more resistant to these wishes.

## 2025-05-18 NOTE — ASSESSMENT & PLAN NOTE
Patient's blood pressure range in the last 24 hours was: BP  Min: 128/74  Max: 179/81.The patient's inpatient anti-hypertensive regimen is listed below:  Current Antihypertensives  carvediloL tablet 3.125 mg, 2 times daily with meals, Oral  NIFEdipine 24 hr tablet 60 mg, Daily, Oral  nitroGLYCERIN SL tablet 0.4 mg, Every 5 min PRN, Sublingual  furosemide tablet 40 mg, Daily, Oral  furosemide (LASIX) tablet, Daily, Oral    Plan  - Restart home regimen. Monitor and adjust PRN.

## 2025-05-18 NOTE — PLAN OF CARE
Pt AAOx4.  VSS.  Afebrile.  NSR on tele w bbb.  On room air, continuous pulse ox on o/n.  Ambulatory w walker.  HTS has been consulted.  No complaints of cp o/n, pt did complain of one episode of sob just after awakening but quickly resolved w no intervention.  Voiding withouit difficulty.  (+)BM.  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  Mag/K replaced PO.  Fall precautions maintained - bed kept in lowest locked position, nonskids on when ambulating, call bell in reach.  Suction/emergency equipment at bedside.

## 2025-05-18 NOTE — PLAN OF CARE
[facility] - Transplant Stepdown  Discharge Final Note    Primary Care Provider: Elis Wick MD    Expected Discharge Date: 5/18/2025    Final Discharge Note (most recent)       Final Note - 05/18/25 1608          Final Note    Assessment Type Final Discharge Note     Anticipated Discharge Disposition Home-Health Care [facility]     Hospital Resources/Appts/Education Provided Provided patient/caregiver with written discharge plan information;Post-Acute resouces added to AVS;Appointments scheduled and added to AVS        Post-Acute Status    Post-Acute Authorization Home Health     Home Health Status Set-up Complete/Auth obtained     Coverage [employer] MANAGED MEDICARE - [employer] SNP HMO PPO SPECIAL NEEDS - CAPITATED     Discharge Delays None known at this time                    Follow-up providers       Courtney Tubbs MD   Specialty: Cardiology, Transplant   Relationship: Consulting Physician    [facility]  [facility] [id number]   Phone: [phone]       Next Steps: Schedule an appointment as soon as possible for a visit in 1 month(s)              After-discharge care                Home Medical Care       *[facility] [place] [facility] [address] [place] [address]   Phone: [phone]                             Future Appointments   Date Time Provider Department Center   5/19/2025  1:10 PM [place] CC LAB [facility] LAB DS [facility]   5/20/2025  3:40 PM Sharyn Hawkins MD [facility] CARDIO [facility]   5/21/2025 11:00 AM Elis Wick MD BS 65PLUS 65+ [facility]   5/22/2025 10:30 AM Yudith Mendoza NP [facility] HEM ONC [facility]   5/22/2025 11:00 AM INJECTION 1, [facility] INFUSION [facility] INFSN [facility]   5/27/2025  2:30 PM Gunner Melgar MD ON[facility] NEPHRO [facility]   5/28/2025 11:30 AM Luz Dickson NP [facility] PALLCAR [facility]   5/29/2025  9:45 AM Jassi Pierre MD [facility] INT JUDIT [place]   6/9/2025  1:45 PM Paxton Vasques OD ONLC OPHTHAL [facility]   10/13/2025  9:00 AM Misael  Christoph YANCEY MD [facility] Reji Sol RN  Weekend  - [facility]  [phone]

## 2025-05-18 NOTE — PROGRESS NOTES
AVS given to pt and reviewed. PIV and tele removed. [employer] scheduled at this time and awaiting pick-up. Medications brought to bedside by pharmacy at this time. Pt being wheeled down to the front of the hospital . VSS.

## 2025-05-18 NOTE — PLAN OF CARE
KISHA requested [employer] for patient discharge Tracie is in a [employer] · [id number] . Informed nurse of arrival to bring patient down.    KISHA Coombs  [phone]

## 2025-05-18 NOTE — ASSESSMENT & PLAN NOTE
Recurrent chest pain on 5/14 in pt with complex Hx including HFmrEF s/p transplant in 1993 c/b CAD in transplant heart and placement of PPM due to OHT and recent admission due to cardiac arrest with unknown rhythm requiring CPR with 1x round of epi after which LHC was deferred due to renal function and prior negative cardiac PET in 11/2024 and she was discharged on 5/6 then readmitted to [facility] on 05/12 with symptomatic hypotension and evidence of myocardial ischemia as well as GRACE on CKD which was felt to possibly be in setting of anemia/demand ischemia due to that at that time. CP is similar to prior chronic pre-cardiac arrest chest pain per pt and is relieved by NTG. Mildly elevated troponins but downtrended with continued improvement of anemia and continued overall VS/clinical stability however due to persistent of pain, pt transferred to [facility] for evaluation with transplant cardiology service.    Plan  - Consult transplant cardiology, follow up recs.  - Monitor on continuous cardiac monitoring and pulse ox. PRN EKG/trop if chest pain or other concerns.  - Continue pt's other chronic meds.

## 2025-05-18 NOTE — PLAN OF CARE
69 yoF with PMH of OHTXP 5/27/92, Breastc ca s/p excision 2021, CT 2024; spinal degenerative disease, CKD transferred from [facility] for evaluation of chest pain    Last month during the hospitalization 4/24-5/6 she experienced cardiac arrest and Left Heart Catheterization was not performed 2/2/ he renal failure. She was DNR and discharged to rehab.    At [facility] her troponin was 0.2, 0.7, 0.1, 0.09  Here HsTrop is 10  EKG showed ischemic changes in inferior, anterior and lateral leads those are not new    We discussed and patient agreed to change her code status to DNR.  We also discussed and she understood that she had coronary artery disease   But nothing else can be done for that. Recommended to use the NTG sublingual as PRN for chest pain.      Yohannes Causey MD  Cardiovascular Medicine Fellow - PGY IV  [facility]

## 2025-05-18 NOTE — ASSESSMENT & PLAN NOTE
Improved s/p transfusions, pt denying any active sxs of bleeding. Most recent hemoglobin and hematocrit are listed below.  Recent Labs     05/16/25  0504 05/17/25  0546 05/18/25  0624   HGB 8.7* 9.3* 9.3*   HCT 27.0* 29.3* 29.0*     Plan  - Monitor serial CBC: Daily  - Order iron studies and vitamin levels for evaluation.  - Transfuse PRBC if patient becomes hemodynamically unstable, symptomatic or H/H drops below 7/21.

## 2025-05-18 NOTE — PLAN OF CARE
Patient AAOx4, afebrile, and vital signs stable. On RA. Cont' pulse ox on. Patient up with stand-by assist to toilet with walker. No C/O chest pain/SOB. On telemetry with BBB. Consult to heart transplant. Lasix 40mg PO ordered and administered. See I&O in flowsheets. Plan of care discussed and questions encouraged. Bed remains in locked and lowest position with personal items and call light within reach.

## 2025-05-18 NOTE — DISCHARGE SUMMARY
"[facility] - Transplant [facility] Medicine  Discharge Summary      Patient Name: Nadia Damon  MRN: [id number]  CHRISTIANO: [id number]  Patient Class: IP- Inpatient  Admission Date: 5/17/2025  Hospital Length of Stay: 1 days  Discharge Date and Time: No discharge date for patient encounter.  Attending Physician: Hermelindo Steven MD   Discharging Provider: Hermelindo Steven MD  Primary Care Provider: Elis Wick MD  [facility] Medicine Team: [facility] MED D Hermelindo Steven MD  Primary Care Team: [facility] D    HPI:   Nadia Damon is a 69 y.o. female with HFmrEF s/p transplant in 1993 c/b CAD in transplant heart and placement of PPM due to OHT, CKD 4, hypertension, secondary hyperparathyroidism with osteoporosis, history of breast cancer in 2021 s/p excision and chemotherapy with radiation finished in 2024, history right middle MCA stroke with residual aphasia and weakness, multilevel spinal degenerative disc disease, depression/anxiety, and recent admission due to cardiac arrest with unknown rhythm requiring CPR with 1x round of epi after which LHC was deferred due to renal function and prior negative cardiac PET in 11/2024 and she was discharged on 5/6 then read[facility]12 with symptomatic hypotension and evidence of myocardial ischemia as well as GRACE on CKD which was felt t possibly be in setting of anemia at that time. She received transfusions with noted improvement of GRACE and trops. During continued monitoring and evaluation at OSH, pt reported chest pain on 5/14 described as left breast, radiating to back and as the senstation of her "heart pounding hard like if I ran" without pattern of inciting factors and relieved by 2X NTG.  Reportedly occurring ~2x daily and pt reports this pain is similar to prior chronic angina that had been ongoing even before her prior cardiac arrest (although she notes CP was more severe prior to cardiac arrest) and states the pain feels " overall differently from post CPR pain. Pt reports that she feels anxiety/family stressors related to her health and her son's health may be playing a role. Repeat troponins after reoccurence of CP continuing to improve. [place] [facility] [place] of chest pain by transplant service and ongoing evaluation/management.    On admit evaluation, the patient reports she overall does feel stable despite the pain and anxiety. She notes developing a painful blister of her lip but denies blisters, ulcers, or rashes elsewhere as well as sore throat or difficulty swallowing. She otherwise denies fever, URI sxs, cough, dyspnea, palptitations, LE edema, abd pain, diarrhea, nausea, vomiting, dysuria, hematuria, dizziness, syncope.      * No surgery found *      Hospital Course:   Ms. 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  Patient was just admitted for cardiac arrest and underwent CPR with 1 round of epi.  Rhythm unknown at that time.  Discharged on 05/06.  Readmitted to Health system on 05/12 with symptomatic hypotension and evidence of myocardial ischemia.  She was anemic at that time.  Her creatinine and troponin I levels improved following transfusion of PRBCs.  Transferred back to us for evaluation by heart transplant service.      Patient continues to have intermittent chest pain.  Unclear if this is related to cardiac issue or if pain is in chest wall and related to recent CPR.  Hemoglobin  has been stable here without evidence of bleeding.  Creatinine returned to baseline.  Patient evaluated by heart transplant service.  No additional workup was recommended.  Patient stable for discharge with outpatient follow-up.  Following goals of care discussion, it was revealed that patient has mostly accepted that her transplant in heart is near the end of its life.  She would like her code status to be DNR.  Other family members have been more resistant to these wishes.     Goals of Care Treatment Preferences:  Code Status: DNR    Health care agent: Moy Watkins (son)  Health care agent number: [phone]    Living Will: Yes     What is most important right now is to focus on remaining as independent as possible, symptom/pain control, improvement in condition but with limits to invasive therapies.  Accordingly, we have decided that the best plan to meet the patient's goals includes continuing with treatment.      SDOH Screening:  The patient was screened for utility difficulties, food insecurity, transport difficulties, housing insecurity, and interpersonal safety and there were no concerns identified this admission.     Consults:   Consults (From admission, onward)          Status Ordering Provider     Inpatient consult to Heart Transplant  Once        Provider:  (Not yet assigned)    Completed MARIAN WILLSON            Assessment & Plan  Chest pain  Recurrent chest pain on 5/14 in pt with complex Hx including HFmrEF s/p transplant in 1993 c/b CAD in transplant heart and placement of PPM due to OHT and recent admission due to cardiac arrest with unknown rhythm requiring CPR with 1x round of epi after which LHC was deferred due to renal function and prior negative cardiac PET in 11/2024 and she was discharged on 5/6 then readmitted to [facility] on 05/12 with symptomatic hypotension and evidence of myocardial ischemia as well as GRACE on CKD which was felt to possibly be in setting of anemia/demand  ischemia due to that at that time. CP is similar to prior chronic pre-cardiac arrest chest pain per pt and is relieved by NTG. Mildly elevated troponins but downtrended with continued improvement of anemia and continued overall VS/clinical stability however due to persistent of pain, pt transferred to [facility] for evaluation with transplant cardiology service.    Plan  - Consult transplant cardiology, follow up recs.  - Monitor on continuous cardiac monitoring and pulse ox. PRN EKG/trop if chest pain or other concerns.  - Continue pt's other chronic meds.    History of heart transplant  Discussion of current presentation and hx of transplant as in chest pain problem as well as plan.    HTN (hypertension)  Patient's blood pressure range in the last 24 hours was: BP  Min: 128/74  Max: 179/81.The patient's inpatient anti-hypertensive regimen is listed below:  Current Antihypertensives  carvediloL tablet 3.125 mg, 2 times daily with meals, Oral  NIFEdipine 24 hr tablet 60 mg, Daily, Oral  nitroGLYCERIN SL tablet 0.4 mg, Every 5 min PRN, Sublingual  furosemide tablet 40 mg, Daily, Oral  furosemide (LASIX) tablet, Daily, Oral    Plan  - Restart home regimen. Monitor and adjust PRN.    Depression with anxiety  Plan  - Continue zoloft.  GERD (gastroesophageal reflux disease)  Plan  - Continue PPI.  Normocytic anemia  Improved s/p transfusions, pt denying any active sxs of bleeding. Most recent hemoglobin and hematocrit are listed below.  Recent Labs     05/16/25  0504 05/17/25  0546 05/18/25  0624   HGB 8.7* 9.3* 9.3*   HCT 27.0* 29.3* 29.0*     Plan  - Monitor serial CBC: Daily  - Order iron studies and vitamin levels for evaluation.  - Transfuse PRBC if patient becomes hemodynamically unstable, symptomatic or H/H drops below 7/21.  Final Active Diagnoses:    Diagnosis Date Noted POA    PRINCIPAL PROBLEM:  Chest pain [R07.9] 04/05/2022 Yes     Chronic    History of heart transplant [Z94.1] 05/17/2025 Not Applicable    HTN  (hypertension) [I10] 05/17/2025 Yes    Depression with anxiety [F41.8] 05/17/2025 Yes    GERD (gastroesophageal reflux disease) [K21.9] 05/17/2025 Yes    Normocytic anemia [D64.9] 05/13/2025 Yes     Chronic      Problems Resolved During this Admission:    Diagnosis Date Noted Date Resolved POA    Acute kidney injury superimposed on CKD [N17.9, N18.9] 05/17/2025 05/18/2025 Yes    HSV (herpes simplex virus) infection [B00.9] 05/17/2025 05/18/2025 Yes       Discharged Condition: fair    Disposition: Home or Self Care    Follow Up:   Follow-up Information       Courtney Tubbs MD. Schedule an appointment as soon as possible for a visit in 1 month(s).    Specialties: Cardiology, Transplant  Contact information:  [address]  [place] [address]  [phone]                           Patient Instructions:      Ambulatory referral/consult to [facility] Palliative Care   Standing Status: Future   Referral Priority: Routine Referral Type: Consultation   Requested Specialty: Palliative Medicine   Number of Visits Requested: 1     Diet Cardiac     Activity as tolerated       Significant Diagnostic Studies: Labs: CMP   Recent Labs   Lab 05/17/25  0546 05/18/25  0624    140   K 3.8 4.6    107   CO2 25 24   GLU 80 77   BUN 40* 36*   CREATININE 3.1* 2.8*   CALCIUM 8.8 8.8   PROT 7.5 7.2   ALBUMIN 3.0* 2.8*   BILITOT 0.4 0.4   ALKPHOS 170* 165*   AST 50* 39   ALT 33 28   ANIONGAP 10 9   , CBC   Recent Labs   Lab 05/17/25  0546 05/18/25  0624   WBC 3.86* 3.24*   HGB 9.3* 9.3*   HCT 29.3* 29.0*    202   , Troponin   Recent Labs   Lab 05/12/25  2236 05/13/25  0200 05/15/25  1407   TROPONINI 0.095* 0.106* 0.035*   , and All labs within the past 24 hours have been reviewed    Pending Diagnostic Studies:       None           Medications:  Reconciled Home Medications:      Medication List        CHANGE how you take these medications      furosemide 40 MG tablet  Commonly known as: LASIX  Take ½ tablet (20 mg  total) by mouth once daily.  What changed: how much to take            CONTINUE taking these medications      aspirin 81 MG EC tablet  Commonly known as: ECOTRIN  Take 1 tablet (81 mg total) by mouth once daily.     calcitRIOL 0.25 MCG Cap  Commonly known as: ROCALTROL  Take 1 capsule (0.25 mcg total) by mouth once daily.     carvediloL 3.125 MG tablet  Commonly known as: COREG  Take 1 tablet (3.125 mg total) by mouth 2 (two) times daily with meals.     cycloSPORINE 100 mg/mL microemulsion solution  Commonly known as: NEORAL  Take 0.5 mLs (50 mg total) by mouth every 12 (twelve) hours.     NIFEdipine 60 MG (OSM) 24 hr tablet  Commonly known as: PROCARDIA-XL  Take 1 tablet (60 mg total) by mouth once daily.     nitroGLYCERIN 0.4 MG SL tablet  Commonly known as: NITROSTAT  PLACE 1 TABLET UNDER THE TONGUE EVERY 5 MINS AS NEEDED FOR CHEST PAIN FOR UP TO 3 DOSES.IF CONTINUED HEART PAIN/PRESSURE AFTER     ondansetron 4 MG tablet  Commonly known as: ZOFRAN  Take 1 tablet (4 mg total) by mouth every 12 (twelve) hours as needed for Nausea.     pantoprazole 40 MG tablet  Commonly known as: PROTONIX  Take 1 tablet (40 mg total) by mouth once daily.     polyethylene glycol 17 gram Pwpk  Commonly known as: GLYCOLAX  Take 17 g by mouth once daily.     pregabalin 50 MG capsule  Commonly known as: LYRICA  Take 1 capsule (50 mg total) by mouth every evening.     sertraline 25 MG tablet  Commonly known as: ZOLOFT  Take 1 tablet (25 mg total) by mouth once daily.     sodium bicarbonate 650 MG tablet  Take 1 tablet (650 mg total) by mouth 2 (two) times daily.     tiZANidine 2 MG tablet  Commonly known as: ZANAFLEX  Take 2 tablets (4 mg total) by mouth 2 (two) times a day.     vitamin E 400 UNIT capsule  Take 400 Units by mouth once daily.            STOP taking these medications      dextromethorphan-guaiFENesin  mg  mg per 12 hr tablet  Commonly known as: MUCINEX DM     mupirocin 2 % ointment  Commonly known as:  BACTROBAN     oxyCODONE-acetaminophen 5-325 mg per tablet  Commonly known as: PERCOCET     temazepam 30 mg capsule  Commonly known as: RESTORIL              Indwelling Lines/Drains at time of discharge:   Lines/Drains/Airways       None                   Time spent on the discharge of patient: 50 minutes         Hermelindo Steven MD  Department of Hospital Medicine  [facility] - Transplant Stepdown

## 2025-05-18 NOTE — PLAN OF CARE
[facility] - Transplant Stepdown      HOME HEALTH ORDERS  FACE TO FACE ENCOUNTER    Patient Name: Nadia Damon  YOB: 1955    PCP: Elis Wick MD   PCP Address: [address] / [place] [address]  PCP Phone Number: [phone]  PCP Fax: [phone]    Encounter Date: 5/15/25    Admit to Home Health    Diagnoses:  Active Hospital Problems    Diagnosis  POA    *Chest pain [R07.9]  Yes     Chronic    History of heart transplant [Z94.1]  Not Applicable    HTN (hypertension) [I10]  Yes    Depression with anxiety [F41.8]  Yes    GERD (gastroesophageal reflux disease) [K21.9]  Yes    Normocytic anemia [D64.9]  Yes     Chronic      Resolved Hospital Problems    Diagnosis Date Resolved POA    Acute kidney injury superimposed on CKD [N17.9, N18.9] 05/18/2025 Yes    HSV (herpes simplex virus) infection [B00.9] 05/18/2025 Yes       Follow Up Appointments:  Future Appointments   Date Time Provider Department Center   5/19/2025  1:10 PM RONNY BRANTLEY CC LAB [facility] LAB DS [facility]   5/20/2025  3:40 PM Sharyn Hawkins MD [facility] CARDIO [facility]   5/21/2025 11:00 AM [facility]   5/22/2025 10:30 AM Yudith Mendoza NP [facility] HEM ONC [facility]   5/22/2025 11:00 AM INJECTION 1, [facility] INFUSION [facility] INFSN [facility]   5/27/2025  2:30 PM Gunner Melgar MD [facility] NEPHRO [facility]   5/28/2025 11:30 AM Luz Dickson NP [facility] PALLCAR [facility]   5/29/2025  9:45 AM Jassi Pierre MD [facility] INT JUDIT [place]   6/9/2025  1:45 PM Paxton Vasques OD [facility] OPHTHAL [facility]   10/13/2025  9:00 AM Christoph Douglas MD [facility] GENSUR [place]       Allergies:  Review of patient's allergies indicates:   Allergen Reactions    Dobutamine Other (See Comments)     Severe Left sided chest pain after dosage administered for Dobutamine Stress Test; itching    Lisinopril Swelling and Rash    Hydrocodone-acetaminophen Nausea Only    Augmentin [amoxicillin-pot clavulanate] Diarrhea    Zyvox  [linezolid] Nausea And Vomiting       Medications: Review discharge medications with patient and family and provide education.    Current Medications[1]     Medication List        CHANGE how you take these medications      furosemide 40 MG tablet  Commonly known as: LASIX  Take ½ tablet (20 mg total) by mouth once daily.  What changed: how much to take            CONTINUE taking these medications      aspirin 81 MG EC tablet  Commonly known as: ECOTRIN  Take 1 tablet (81 mg total) by mouth once daily.     calcitRIOL 0.25 MCG Cap  Commonly known as: ROCALTROL  Take 1 capsule (0.25 mcg total) by mouth once daily.     carvediloL 3.125 MG tablet  Commonly known as: COREG  Take 1 tablet (3.125 mg total) by mouth 2 (two) times daily with meals.     cycloSPORINE 100 mg/mL microemulsion solution  Commonly known as: NEORAL  Take 0.5 mLs (50 mg total) by mouth every 12 (twelve) hours.     NIFEdipine 60 MG (OSM) 24 hr tablet  Commonly known as: PROCARDIA-XL  Take 1 tablet (60 mg total) by mouth once daily.     nitroGLYCERIN 0.4 MG SL tablet  Commonly known as: NITROSTAT  PLACE 1 TABLET UNDER THE TONGUE EVERY 5 MINS AS NEEDED FOR CHEST PAIN FOR UP TO 3 DOSES.IF CONTINUED HEART PAIN/PRESSURE AFTER     ondansetron 4 MG tablet  Commonly known as: ZOFRAN  Take 1 tablet (4 mg total) by mouth every 12 (twelve) hours as needed for Nausea.     pantoprazole 40 MG tablet  Commonly known as: PROTONIX  Take 1 tablet (40 mg total) by mouth once daily.     polyethylene glycol 17 gram Pwpk  Commonly known as: GLYCOLAX  Take 17 g by mouth once daily.     pregabalin 50 MG capsule  Commonly known as: LYRICA  Take 1 capsule (50 mg total) by mouth every evening.     sertraline 25 MG tablet  Commonly known as: ZOLOFT  Take 1 tablet (25 mg total) by mouth once daily.     sodium bicarbonate 650 MG tablet  Take 1 tablet (650 mg total) by mouth 2 (two) times daily.     tiZANidine 2 MG tablet  Commonly known as: ZANAFLEX  Take 2 tablets (4 mg total) by  mouth 2 (two) times a day.     vitamin E 400 UNIT capsule  Take 400 Units by mouth once daily.            STOP taking these medications      dextromethorphan-guaiFENesin  mg  mg per 12 hr tablet  Commonly known as: MUCINEX DM     mupirocin 2 % ointment  Commonly known as: BACTROBAN     oxyCODONE-acetaminophen 5-325 mg per tablet  Commonly known as: PERCOCET     temazepam 30 mg capsule  Commonly known as: RESTORIL                I have seen and examined this patient within the last 30 days. My clinical findings that support the need for the home health skilled services and home bound status are the following:no   Weakness/numbness causing balance and gait disturbance due to Stroke and Heart Failure making it taxing to leave home.  Requiring assistive device to leave home due to unsteady gait caused by  Stroke and Heart Failure.          Diet: Low Sodium  Acitivities: As Tolerated     Nursing:   SN to complete comprehensive assessment including routine vital signs. Instruct on disease process and s/s of complications to report to MD. Review/verify medication list sent home with the patient at time of discharge  and instruct patient/caregiver as needed. Frequency may be adjusted depending on start of care date.     Notify MD if SBP > 160 or < 90; DBP > 90 or < 50; HR > 120 or < 50; Temp > 101; Weight gain >3lbs in 1 day or 5lbs in 1 week.     LABS:  SN to perform labs: Weekly bmp, mag, cbc.         CONSULTS:       Physical Therapy she will be referred to center near her home for PT.  PT is not to be performed via HH.  Occupational Therapy to evaluate and treat. Evaluate home environment for safety and equipment needs. Perform/Instruct on transfers, ADL training, ROM, and therapeutic exercises.  Speech Therapy  to evaluate and treat for:  Language  Swallowing  Cognition              **Transition to outpatient PT/OT once/if appropriate**                                  to evaluate for community  resources/long-range planning.   Aide to provide assistance with personal care, ADLs, and vital signs     Send initial Home Health orders to HTS attending physician on call.  Send follow up questions to [phone] or fax:                                                                                                                                                                                    Pre Transplant:   [phone]                                                                          Post Transplant: [phone]                                                              Heart Failure:      [phone]              [1]   Current Facility-Administered Medications   Medication Dose Route Frequency Provider Last Rate Last Admin    acetaminophen tablet 650 mg  650 mg Oral Q4H PRN Tessa Salgado DO   650 mg at 05/18/25 0207    acyclovir capsule 200 mg  200 mg Oral TID Tessa Salgado DO   200 mg at 05/18/25 1500    aspirin EC tablet 81 mg  81 mg Oral Daily Tessa Salgado DO   81 mg at 05/18/25 0817    carvediloL tablet 3.125 mg  3.125 mg Oral BID WM Tessa Salgado DO   3.125 mg at 05/18/25 0817    cycloSPORINE modified (NEORAL) capsule 50 mg  50 mg Oral BID Tessa Salgado DO   50 mg at 05/18/25 0817    dextrose 50% injection 12.5 g  12.5 g Intravenous PRN Tessa Salgado, DO        dextrose 50% injection 25 g  25 g Intravenous PRN Tessa Salgado, DO        furosemide tablet 40 mg  40 mg Oral Daily Hermelindo Setven MD   40 mg at 05/18/25 0902    glucagon (human recombinant) injection 1 mg  1 mg Intramuscular PRN Tessa Salgado, DO        glucose chewable tablet 16 g  16 g Oral PRN Tessa Salgado, DO        glucose chewable tablet 24 g  24 g Oral PRN Tessa Salgado, DO        heparin (porcine) injection 5,000 Units  5,000 Units Subcutaneous Q8H Tessa Salgado DO   5,000 Units at 05/18/25 0600    melatonin tablet 6 mg  6 mg Oral Nightly PRN Tessa Salgado, DO        naloxone 0.4 mg/mL  injection 0.02 mg  0.02 mg Intravenous PRN Tessa Salgado DO        NIFEdipine 24 hr tablet 60 mg  60 mg Oral Daily Tessa Salgado DO   60 mg at 05/18/25 0817    nitroGLYCERIN SL tablet 0.4 mg  0.4 mg Sublingual Q5 Min PRN Tessa Salgado DO   0.4 mg at 05/17/25 0745    pantoprazole EC tablet 40 mg  40 mg Oral Daily Tessa Salgado DO   40 mg at 05/18/25 0817    polyethylene glycol packet 17 g  17 g Oral Daily PRN Tessa Salgado DO        pregabalin capsule 50 mg  50 mg Oral QHS Tessa Salgado DO   50 mg at 05/17/25 2002    prochlorperazine tablet 5 mg  5 mg Oral Q6H PRN Tessa Salgado DO        senna-docusate 8.6-50 mg per tablet 1 tablet  1 tablet Oral BID PRN Tessa Salgado DO        sertraline tablet 25 mg  25 mg Oral Daily Tessa Salgado DO   25 mg at 05/18/25 0817    sodium bicarbonate tablet 650 mg  650 mg Oral BID Tessa Salgado DO   650 mg at 05/18/25 0817    sodium chloride 0.9% flush 10 mL  10 mL Intravenous Q12H PRN Tessa Salgado DO        tiZANidine tablet 4 mg  4 mg Oral BID PRN Tessa Salgado DO   4 mg at 05/18/25 0902

## 2025-05-18 NOTE — CONSULTS
[facility] - Transplant Stepdown  Heart Transplant  Consult Note    Patient Name: Nadia Damon  MRN: [id number]  Admission Date: 5/17/2025  Hospital Length of Stay: 1 days  Attending Physician: Hermelindo Steven MD  Primary Care Provider: Elis Wick MD   Principal Problem:Chest pain    Inpatient consult to Heart Transplant  Consult performed by: Yohannes Causey MD  Consult ordered by: Tessa Salgado DO  Reason for consult: Evaluation of chest pain          Please see the plan of care note    Thank you for your consult. I will sign off. Please contact us if you have any additional questions.    Yohannes Causey MD  Heart Transplant  [facility] - Transplant [place]

## 2025-05-19 ENCOUNTER — TELEPHONE (OUTPATIENT)
Dept: PRIMARY CARE CLINIC | Facility: CLINIC | Age: 70
End: 2025-05-19
Payer: MEDICARE

## 2025-05-19 ENCOUNTER — PATIENT OUTREACH (OUTPATIENT)
Dept: ADMINISTRATIVE | Facility: CLINIC | Age: 70
End: 2025-05-19
Payer: MEDICARE

## 2025-05-20 ENCOUNTER — LAB VISIT (OUTPATIENT)
Dept: LAB | Facility: HOSPITAL | Age: 70
End: 2025-05-20
Attending: NURSE PRACTITIONER
Payer: MEDICARE

## 2025-05-20 ENCOUNTER — TELEPHONE (OUTPATIENT)
Facility: HOSPITAL | Age: 70
End: 2025-05-20
Payer: MEDICARE

## 2025-05-20 ENCOUNTER — OFFICE VISIT (OUTPATIENT)
Dept: CARDIOLOGY | Facility: CLINIC | Age: 70
End: 2025-05-20
Payer: MEDICARE

## 2025-05-20 VITALS
OXYGEN SATURATION: 100 % | SYSTOLIC BLOOD PRESSURE: 171 MMHG | DIASTOLIC BLOOD PRESSURE: 101 MMHG | HEART RATE: 86 BPM | WEIGHT: 134.06 LBS | BODY MASS INDEX: 24.52 KG/M2

## 2025-05-20 DIAGNOSIS — R11.2 NAUSEA AND VOMITING, UNSPECIFIED VOMITING TYPE: ICD-10-CM

## 2025-05-20 DIAGNOSIS — I25.758: ICD-10-CM

## 2025-05-20 DIAGNOSIS — I10 ESSENTIAL HYPERTENSION: Chronic | ICD-10-CM

## 2025-05-20 DIAGNOSIS — Z94.1 HISTORY OF HEART TRANSPLANT: ICD-10-CM

## 2025-05-20 DIAGNOSIS — F41.8 DEPRESSION WITH ANXIETY: ICD-10-CM

## 2025-05-20 DIAGNOSIS — I50.32 CHRONIC DIASTOLIC (CONGESTIVE) HEART FAILURE: Chronic | ICD-10-CM

## 2025-05-20 DIAGNOSIS — I10 PRIMARY HYPERTENSION: ICD-10-CM

## 2025-05-20 DIAGNOSIS — Z94.1 S/P ORTHOTOPIC HEART TRANSPLANT: Primary | ICD-10-CM

## 2025-05-20 DIAGNOSIS — I10 CHRONIC HYPERTENSION: ICD-10-CM

## 2025-05-20 DIAGNOSIS — R07.89 OTHER CHEST PAIN: ICD-10-CM

## 2025-05-20 DIAGNOSIS — D63.1 ANEMIA ASSOCIATED WITH STAGE 5 CHRONIC RENAL FAILURE: ICD-10-CM

## 2025-05-20 DIAGNOSIS — N18.5 ANEMIA ASSOCIATED WITH STAGE 5 CHRONIC RENAL FAILURE: ICD-10-CM

## 2025-05-20 LAB
ABSOLUTE EOSINOPHIL (OHS): 0.11 K/UL
ABSOLUTE MONOCYTE (OHS): 0.48 K/UL (ref 0.3–1)
ABSOLUTE NEUTROPHIL COUNT (OHS): 2.18 K/UL (ref 1.8–7.7)
BASOPHILS # BLD AUTO: 0.02 K/UL
BASOPHILS NFR BLD AUTO: 0.5 %
ERYTHROCYTE [DISTWIDTH] IN BLOOD BY AUTOMATED COUNT: 16.3 % (ref 11.5–14.5)
FERRITIN SERPL-MCNC: 200 NG/ML (ref 20–300)
HCT VFR BLD AUTO: 32 % (ref 37–48.5)
HGB BLD-MCNC: 10.3 GM/DL (ref 12–16)
IMM GRANULOCYTES # BLD AUTO: 0.01 K/UL (ref 0–0.04)
IMM GRANULOCYTES NFR BLD AUTO: 0.3 % (ref 0–0.5)
IRON SATN MFR SERPL: 18 % (ref 20–50)
IRON SERPL-MCNC: 61 UG/DL (ref 30–160)
LYMPHOCYTES # BLD AUTO: 0.97 K/UL (ref 1–4.8)
MCH RBC QN AUTO: 28.9 PG (ref 27–31)
MCHC RBC AUTO-ENTMCNC: 32.2 G/DL (ref 32–36)
MCV RBC AUTO: 90 FL (ref 82–98)
NUCLEATED RBC (/100WBC) (OHS): 0 /100 WBC
PLATELET # BLD AUTO: 246 K/UL (ref 150–450)
PMV BLD AUTO: 10.1 FL (ref 9.2–12.9)
RBC # BLD AUTO: 3.57 M/UL (ref 4–5.4)
RELATIVE EOSINOPHIL (OHS): 2.9 %
RELATIVE LYMPHOCYTE (OHS): 25.7 % (ref 18–48)
RELATIVE MONOCYTE (OHS): 12.7 % (ref 4–15)
RELATIVE NEUTROPHIL (OHS): 57.9 % (ref 38–73)
TIBC SERPL-MCNC: 346 UG/DL (ref 250–450)
TRANSFERRIN SERPL-MCNC: 234 MG/DL (ref 200–375)
WBC # BLD AUTO: 3.77 K/UL (ref 3.9–12.7)

## 2025-05-20 PROCEDURE — 84466 ASSAY OF TRANSFERRIN: CPT | Mod: HCNC

## 2025-05-20 PROCEDURE — 1159F MED LIST DOCD IN RCRD: CPT | Mod: CPTII,HCNC,S$GLB, | Performed by: INTERNAL MEDICINE

## 2025-05-20 PROCEDURE — 3288F FALL RISK ASSESSMENT DOCD: CPT | Mod: CPTII,HCNC,S$GLB, | Performed by: INTERNAL MEDICINE

## 2025-05-20 PROCEDURE — 3008F BODY MASS INDEX DOCD: CPT | Mod: CPTII,HCNC,S$GLB, | Performed by: INTERNAL MEDICINE

## 2025-05-20 PROCEDURE — 1101F PT FALLS ASSESS-DOCD LE1/YR: CPT | Mod: CPTII,HCNC,S$GLB, | Performed by: INTERNAL MEDICINE

## 2025-05-20 PROCEDURE — 3077F SYST BP >= 140 MM HG: CPT | Mod: CPTII,HCNC,S$GLB, | Performed by: INTERNAL MEDICINE

## 2025-05-20 PROCEDURE — 3080F DIAST BP >= 90 MM HG: CPT | Mod: CPTII,HCNC,S$GLB, | Performed by: INTERNAL MEDICINE

## 2025-05-20 PROCEDURE — 1157F ADVNC CARE PLAN IN RCRD: CPT | Mod: CPTII,HCNC,S$GLB, | Performed by: INTERNAL MEDICINE

## 2025-05-20 PROCEDURE — 1125F AMNT PAIN NOTED PAIN PRSNT: CPT | Mod: CPTII,HCNC,S$GLB, | Performed by: INTERNAL MEDICINE

## 2025-05-20 PROCEDURE — 99999 PR PBB SHADOW E&M-EST. PATIENT-LVL III: CPT | Mod: PBBFAC,HCNC,, | Performed by: INTERNAL MEDICINE

## 2025-05-20 PROCEDURE — 85025 COMPLETE CBC W/AUTO DIFF WBC: CPT | Mod: HCNC

## 2025-05-20 PROCEDURE — 99215 OFFICE O/P EST HI 40 MIN: CPT | Mod: HCNC,S$GLB,, | Performed by: INTERNAL MEDICINE

## 2025-05-20 PROCEDURE — 3066F NEPHROPATHY DOC TX: CPT | Mod: CPTII,HCNC,S$GLB, | Performed by: INTERNAL MEDICINE

## 2025-05-20 PROCEDURE — 1111F DSCHRG MED/CURRENT MED MERGE: CPT | Mod: CPTII,HCNC,S$GLB, | Performed by: INTERNAL MEDICINE

## 2025-05-20 PROCEDURE — G2211 COMPLEX E/M VISIT ADD ON: HCPCS | Mod: HCNC,S$GLB,, | Performed by: INTERNAL MEDICINE

## 2025-05-20 PROCEDURE — 82728 ASSAY OF FERRITIN: CPT | Mod: HCNC

## 2025-05-20 PROCEDURE — 36415 COLL VENOUS BLD VENIPUNCTURE: CPT | Mod: HCNC

## 2025-05-20 RX ORDER — ISOSORBIDE MONONITRATE 60 MG/1
60 TABLET, EXTENDED RELEASE ORAL DAILY
Qty: 30 TABLET | Refills: 11 | Status: SHIPPED | OUTPATIENT
Start: 2025-05-20 | End: 2026-05-20

## 2025-05-20 RX ORDER — ISOSORBIDE MONONITRATE 60 MG/1
60 TABLET, EXTENDED RELEASE ORAL DAILY
Qty: 30 TABLET | Refills: 11 | Status: SHIPPED | OUTPATIENT
Start: 2025-05-20 | End: 2025-05-20

## 2025-05-20 RX ORDER — NIFEDIPINE 60 MG/1
60 TABLET, EXTENDED RELEASE ORAL 2 TIMES DAILY
Qty: 60 TABLET | Refills: 11 | Status: SHIPPED | OUTPATIENT
Start: 2025-05-20

## 2025-05-20 RX ORDER — CARVEDILOL 3.12 MG/1
3.12 TABLET ORAL 2 TIMES DAILY WITH MEALS
Qty: 180 TABLET | Refills: 3 | Status: SHIPPED | OUTPATIENT
Start: 2025-05-20

## 2025-05-20 RX ORDER — ONDANSETRON 4 MG/1
TABLET, FILM COATED ORAL
Qty: 16 TABLET | Refills: 0 | Status: SHIPPED | OUTPATIENT
Start: 2025-05-20

## 2025-05-20 RX ORDER — NIFEDIPINE 60 MG/1
60 TABLET, EXTENDED RELEASE ORAL 2 TIMES DAILY
Qty: 60 TABLET | Refills: 11 | Status: SHIPPED | OUTPATIENT
Start: 2025-05-20 | End: 2025-05-20

## 2025-05-20 NOTE — TELEPHONE ENCOUNTER
Spoke with Ms. Felipety. She is doing home health and home PT for now. Chest is sore from compressions. She got out of the hospital on Sunday.   Appt with Luz at [facility] next Weds. I will try to coordinate to say hello. Pausing acupuncture for now, but we can discuss continuing at a later date.  -CH

## 2025-05-20 NOTE — PROGRESS NOTES
Subjective:   Patient ID:  05/20/2025      Nadia Damon is a 69 y.o. female who presents for evaluation of Fatigue and Hospital Follow Up    Recently discharged from the hospital after cardiac arrest versus syncope.  Unclear.  She also had a AYAAN at [facility] to rule out an aortic dissection.  ICD with nonconducted due to the fact that she has a chronic kidney disease to avoid too much contrast.    Left heart catheterization was also not done due to chronic kidney disease.  However she is willing today she states to undergo a heart catheterization if needed.      History of Present Illness    CHIEF COMPLAINT:  Patient presents today for follow up of chest pain and fatigue    CHEST PAIN:  She reports chest tightness and pressure with exertion. The discomfort begins in the center of the chest and radiates to the left side, described as a tightness that prompts her to lie down. She takes up to 2 Nitroglycerin tablets for symptom relief and carries them at all times.    FATIGUE AND APPETITE:  She reports significant fatigue with no energy. She received temporary relief from a recent blood transfusion, but symptoms have returned. She reports poor appetite but forces herself to eat due to medication requirements.    MEDICAL HISTORY:  She has a history of cardiac transplant in 1993, pacemaker placement, syncope with complete amnesia of the event, stroke, and CKD.    MEDICATIONS:  She takes Nifedipine 60mg daily, low-dose Carvedilol, and Nitroglycerin tablets as needed. Cyclosporine was recently decreased from 75mg to 50mg twice daily due to kidney concerns. She also takes Lasix and Calcitriol for kidney management.    DIET:  She follows a renal diet and receives 14 pre-prepared meals specifically designed for her dietary needs.         HPI    Past Medical History:   Diagnosis Date    Abdominal wall hernia     CT Renal 6/11/2018---Small fat containing superior ventral abdominal wall hernia at the epicardial pacing  lead site.    Abnormal mammogram 10/12/2021    Acute cystitis without hematuria 05/10/2022    Acute ischemic right middle cerebral artery (MCA) stroke 07/20/2024    Adverse drug reaction 11/12/2024    GRACE (acute kidney injury) 11/22/2021    Anxiety     Aphasia 07/19/2024    Arthritis     ZEN HIPS    Breast cancer in female 08/2021    LEFT BREAST    Breast mass, right 11/13/2024    RIGHT BREAST MASS (Problem)      Bronchitis 08/18/2016    Never smoked      C. difficile colitis 11/29/2021    Cellulitis of axilla, left 12/23/2021    Chronic diastolic heart failure 12/16/2021    Chronic kidney disease     stage 4, GFR 15-29 ml/min    Chronic midline low back pain without sciatica 06/18/2018    CKD (chronic kidney disease) stage 4, GFR 15-29 ml/min 04/20/2016    US Retro   5/16/2018---Mild cortical thinning and increased cortical echogenicity.  Findings can be seen with medical renal disease.  8/31/216--- Echogenic kidneys with diffuse cortical thinning suggesting  medical renal disease. 2 complex right renal cortical cysts.      Closed nondisplaced fracture of distal phalanx of left great toe with routine healing 10/22/2018    Coronary artery disease 1993    heart transplant    COVID-19 in immunocompromised patient 02/26/2024    Cystitis 05/10/2022    Depression     Encounter for blood transfusion     Encounter for palliative care 10/14/2024    Fibromyalgia     on Lyrica    Heart failure     native heart cardiomyopathy    Heart transplanted 1993    due to cardiomyopathy    History of hyperparathyroidism; Hyperparathyroidism, secondary renal     PT DENIES    Hypertension     Immune disorder     anti rejection meds    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    Impaired mobility 07/28/2022    Iron deficiency anemia 08/15/2017    Kidney stones     passed per pt    Obesity     Obesity (BMI 30.0-34.9) 07/22/2019    Other osteoporosis without current pathological fracture 08/30/2019    Other pancytopenia  05/06/2024    Parotitis, acute 09/19/2023    Pharyngitis 01/09/2019    Pneumonia due to infectious organism 03/14/2024    PONV (postoperative nausea and vomiting)     Severe sepsis 11/22/2021    Shingles 2003 approx    left leg    Stage 4 chronic kidney disease 11/22/2021    Subclinical hypothyroidism 06/16/2023    Thrombocytopenia, unspecified 11/29/2021    Trouble in sleeping     Unresponsiveness 11/13/2024    Urinary incontinence        Past Surgical History:   Procedure Laterality Date    bladder implant Right 06/11/2024    BLADDER SURGERY  2015 approx    mesh - Dr Everett then 2nd reconstructive sx Dr Onofre    BREAST BIOPSY Bilateral     NEGATIVE    BREAST BIOPSY Right 10/31/2022    benign    BREAST LUMPECTOMY Left 2021    BREAST SURGERY Left 09/28/2015    Bx - benign    BREAST SURGERY Right 12/2015    Bx benign    CARDIAC PACEMAKER REMOVAL Left 06/26/2014    Pacer defirillator removed. Put in 1993 aat time of heart transplant    CARPAL TUNNEL RELEASE Left 03/03/2015    Dr. Hall    COLONOSCOPY N/A 02/25/2021    Procedure: COLONOSCOPY;  Surgeon: Freida Ramirez MD;  Location: [facility];  Service: Endoscopy;  Laterality: N/A;    CYSTOCELE REPAIR      Twice with mesh removal    ECHOCARDIOGRAM,TRANSESOPHAGEAL N/A 4/26/2025    Procedure: Transesophageal echo (AYAAN) intra-procedure log documentation;  Surgeon: Barron Chinchilla MD;  Location: [facility] OR [address] [place] [address];  Service: Cardiothoracic;  Laterality: N/A;    EPIDURAL STEROID INJECTION INTO CERVICAL SPINE N/A 02/02/2023    Procedure: T11/T12 IL HELLEN;  Surgeon: Jassi Pierre MD;  Location: [facility] PAIN [facility]GT;  Service: Pain Management;  Laterality: N/A;    EPIDURAL STEROID INJECTION INTO CERVICAL SPINE N/A 9/16/2024    Procedure: T11/T12 IL HELLEN;  Surgeon: Jassi Pierre MD;  Location: [facility]IN [facility]GT;  Service: Pain Management;  Laterality: N/A;    HEART TRANSPLANT  1993    HERNIA REPAIR Right 1971 approx    Inguinal    HYSTERECTOMY  1983    vag hyst /LSO      IMPLANTATION OF PERMANENT SACRAL NERVE STIMULATOR N/A 06/11/2024    Procedure: INSERTION, NEUROSTIMULATOR, PERMANENT, SACRAL;  Surgeon: Sami Cochran MD;  Location: [facility] OR;  Service: Urology;  Laterality: N/A;    INCISION AND DRAINAGE OF ABSCESS Left 12/24/2021    Procedure: INCISION AND DRAINAGE, ABSCESS;  Surgeon: Joseph Longo MD;  Location: [facility] OR;  Service: General;  Laterality: Left;    INJECTION OF ANESTHETIC AGENT AROUND MEDIAL BRANCH NERVES INNERVATING LUMBAR FACET JOINT Right 10/19/2022    Procedure: Right L4/L5 and L5/S1 MBB;  Surgeon: Jassi Pierre MD;  Location: [facility] PAIN MGT;  Service: Pain Management;  Laterality: Right;    INJECTION OF ANESTHETIC AGENT AROUND MEDIAL BRANCH NERVES INNERVATING LUMBAR FACET JOINT Right 11/09/2022    Procedure: Right L4/L5 and L5/S1 MBB;  Surgeon: Jassi Pierre MD;  Location: [facility] PAIN [facility]GT;  Service: Pain Management;  Laterality: Right;    INJECTION OF ANESTHETIC AGENT AROUND MEDIAL BRANCH NERVES INNERVATING LUMBAR FACET JOINT Left 2/6/2025    Procedure: Left L4-5 and L5-S1 MBB #1 with local;  Surgeon: Jassi Pierre MD;  Location: [facility] PAIN [facility]GT;  Service: Pain Management;  Laterality: Left;    INJECTION OF ANESTHETIC AGENT AROUND MEDIAL BRANCH NERVES INNERVATING LUMBAR FACET JOINT Left 3/19/2025    Procedure: Left L4-5 and L5-S1 MBB #2 with local;  Surgeon: Jassi Pierre MD;  [facility];  Service: Pain Management;  Laterality: Left;    INJECTION OF ANESTHETIC AGENT INTO SACROILIAC JOINT Right 08/22/2022    Procedure: Right SIJ Injection Right L5/S1 Facte Injection;  Surgeon: Jassi Pierre MD;  Location: [facility] PAIN [facility]GT;  Service: Pain Management;  Laterality: Right;    INSERTION OF TUNNELED CENTRAL VENOUS CATHETER (CVC) WITH SUBCUTANEOUS PORT N/A 11/09/2021    Procedure: [id number];  Surgeon: Christoph Douglas MD;  Location: [facility] OR;  Service: General;  Laterality: N/A;    RADIOFREQUENCY ABLATION Right 12/5/2024     Procedure: Right L4-5 and L5-S1 RFA;  Surgeon: Jassi Pierre MD;  Location: [facility]IN [facility];  Service: Pain Management;  Laterality: Right;    RADIOFREQUENCY THERMOCOAGULATION Right 12/07/2022    Procedure: Right L4/L5 and L5/S1 Lumbar RFA;  Surgeon: Jassi Pierre MD;  [facility];  Service: Pain Management;  Laterality: Right;    REMOVAL OF ELECTRODE LEAD OF SACRAL NERVE STIMULATOR N/A 2/18/2025    Procedure: REMOVAL, ELECTRODE LEAD, SACRAL NERVE STIMULATOR;  Surgeon: Sami Cochran MD;  Location: [facility] OR;  Service: Urology;  Laterality: N/A;    REMOVAL OF VASCULAR ACCESS PORT      ROBOT-ASSISTED LAPAROSCOPIC ABDOMINAL SACROCOLPOPEXY N/A 08/10/2023    Procedure: ROBOTIC SACROCOLPOPEXY, ABDOMEN;  Surgeon: PRANAY Villalobos MD;  Location: [facility] OR;  Service: OB/GYN;  Laterality: N/A;    ROBOT-ASSISTED LAPAROSCOPIC OOPHORECTOMY Right 08/10/2023    Procedure: ROBOTIC OOPHORECTOMY;  Surgeon: PRANAY Villalobos MD;  Location: [facility] OR;  Service: OB/GYN;  Laterality: Right;    SENTINEL LYMPH NODE BIOPSY Left 10/12/2021    Procedure: BIOPSY, LYMPH NODE, SENTINEL;  Surgeon: Christoph Douglas MD;  Location: [facility] OR;  Service: General;  Laterality: Left;    TOE SURGERY      XI ROBOTIC URETHROPEXY N/A 08/10/2023    Procedure: XI ROBOTIC URETHROPEXY;  Surgeon: PRANAY Villalobos MD;  Location: [facility] OR;  Service: OB/GYN;  Laterality: N/A;       Social History[1]    Family History   Problem Relation Name Age of Onset    Cancer Mother  38        breast    Breast cancer Mother      Breast cancer Maternal Grandmother      Heart disease Maternal Grandmother      Hypertension Son      Cataracts Cousin      Diabetes Neg Hx      Stroke Neg Hx      Kidney disease Neg Hx      Asthma Neg Hx      COPD Neg Hx      Melanoma Neg Hx      Hyperlipidemia Neg Hx           Review of Systems   Cardiovascular:  Positive for chest pain and dyspnea on exertion. Negative for palpitations and syncope.   Neurological:  Negative  for focal weakness.       Current Outpatient Medications on File Prior to Visit   Medication Sig    aspirin (ECOTRIN) 81 MG EC tablet Take 1 tablet (81 mg total) by mouth once daily.    calcitRIOL (ROCALTROL) 0.25 MCG Cap Take 1 capsule (0.25 mcg total) by mouth once daily.    cycloSPORINE (NEORAL) 100 mg/mL microemulsion solution Take 0.5 mLs (50 mg total) by mouth every 12 (twelve) hours.    furosemide (LASIX) 40 MG tablet Take ½ tablet (20 mg total) by mouth once daily.    nitroGLYCERIN (NITROSTAT) 0.4 MG SL tablet PLACE 1 TABLET UNDER THE TONGUE EVERY 5 MINS AS NEEDED FOR CHEST PAIN FOR UP TO 3 DOSES.IF CONTINUED HEART PAIN/PRESSURE AFTER    pantoprazole (PROTONIX) 40 MG tablet Take 1 tablet (40 mg total) by mouth once daily.    polyethylene glycol (GLYCOLAX) 17 gram PwPk Take 17 g by mouth once daily.    pregabalin (LYRICA) 50 MG capsule Take 1 capsule (50 mg total) by mouth every evening.    sertraline (ZOLOFT) 25 MG tablet Take 1 tablet (25 mg total) by mouth once daily.    sodium bicarbonate 650 MG tablet Take 1 tablet (650 mg total) by mouth 2 (two) times daily.    tiZANidine (ZANAFLEX) 2 MG tablet Take 2 tablets (4 mg total) by mouth 2 (two) times a day.    vitamin E 400 UNIT capsule Take 400 Units by mouth once daily.    [DISCONTINUED] NIFEdipine (PROCARDIA-XL) 60 MG (OSM) 24 hr tablet Take 1 tablet (60 mg total) by mouth once daily.    [DISCONTINUED] carvediloL (COREG) 3.125 MG tablet Take 1 tablet (3.125 mg total) by mouth 2 (two) times daily with meals.    [DISCONTINUED] ondansetron (ZOFRAN) 4 MG tablet Take 1 tablet (4 mg total) by mouth every 12 (twelve) hours as needed for Nausea.     No current facility-administered medications on file prior to visit.       Objective:   Objective:  Wt Readings from Last 3 Encounters:   05/20/25 60.8 kg (134 lb 0.6 oz)   05/17/25 58.4 kg (128 lb 12 oz)   05/16/25 62.1 kg (136 lb 14.5 oz)     Temp Readings from Last 3 Encounters:   05/18/25 97.4 °F (36.3 °C) (Oral)    05/16/25 97.7 °F (36.5 °C) (Oral)   05/12/25 97.6 °F (36.4 °C) (Skin)     BP Readings from Last 3 Encounters:   05/20/25 (!) 171/101   05/18/25 (!) 152/85   05/16/25 134/71     Pulse Readings from Last 3 Encounters:   05/20/25 86   05/18/25 86   05/16/25 94       Physical Exam  Vitals reviewed.   Constitutional:       Appearance: She is well-developed.   Neck:      Vascular: No carotid bruit.   Cardiovascular:      Rate and Rhythm: Normal rate and regular rhythm.      Pulses: Intact distal pulses.      Heart sounds: Normal heart sounds. No murmur heard.  Pulmonary:      Breath sounds: Normal breath sounds.   Neurological:      Mental Status: She is oriented to person, place, and time.           Lab Results   Component Value Date    CHOL 196 04/25/2025    CHOL 142 07/20/2024    CHOL 152 07/19/2024     Lab Results   Component Value Date    HDL 59 04/25/2025    HDL 44 07/20/2024    HDL 48 07/19/2024     Lab Results   Component Value Date    LDLCALC 98.6 04/25/2025    LDLCALC 81.0 07/20/2024    LDLCALC 73.8 07/19/2024     Lab Results   Component Value Date    TRIG 192 (H) 04/25/2025    TRIG 85 07/20/2024    TRIG 151 (H) 07/19/2024     Lab Results   Component Value Date    CHOLHDL 30.1 04/25/2025    CHOLHDL 31.0 07/20/2024    CHOLHDL 31.6 07/19/2024       Chemistry        Component Value Date/Time     05/18/2025 0624     02/24/2025 1048     02/24/2025 1048    K 4.6 05/18/2025 0624    K 4.7 02/24/2025 1048    K 4.7 02/24/2025 1048     05/18/2025 0624     02/24/2025 1048     02/24/2025 1048    CO2 24 05/18/2025 0624    CO2 21 (L) 02/24/2025 1048    CO2 21 (L) 02/24/2025 1048    BUN 36 (H) 05/18/2025 0624    CREATININE 2.8 (H) 05/18/2025 0624    GLU 77 05/18/2025 0624    GLU 76 02/24/2025 1048    GLU 76 02/24/2025 1048        Component Value Date/Time    CALCIUM 8.8 05/18/2025 0624    CALCIUM 9.7 02/24/2025 1048    CALCIUM 9.7 02/24/2025 1048    ALKPHOS 165 (H) 05/18/2025 0624     ALKPHOS 120 02/24/2025 1048    AST 39 05/18/2025 0624    AST 42 (H) 02/24/2025 1048    ALT 28 05/18/2025 0624    ALT 28 02/24/2025 1048    BILITOT 0.4 05/18/2025 0624    BILITOT 0.4 02/24/2025 1048    ESTGFRAFRICA 19 (A) 07/14/2022 1100    EGFRNONAA 17 (A) 07/14/2022 1100          Lab Results   Component Value Date    TSH 12.101 (H) 04/25/2025     Lab Results   Component Value Date    INR 0.9 05/07/2025    INR 1.0 04/27/2025    INR 1.0 11/12/2024    PROTIME 10.7 05/07/2025    PROTIME 10.9 04/27/2025     Lab Results   Component Value Date    WBC 3.24 (L) 05/18/2025    HGB 9.3 (L) 05/18/2025    HCT 29.0 (L) 05/18/2025    MCV 88 05/18/2025     05/18/2025     BNP  @LABRCNTIP(BNP,BNPTRIAGEBLO)@  Estimated Creatinine Clearance: 16.1 mL/min (A) (based on SCr of 2.8 mg/dL (H)).     Imaging:  ======    No results found for this or any previous visit.    Results for orders placed during the hospital encounter of 05/12/25    US Carotid Bilateral    Narrative  EXAM:  US CAROTID BILATERAL    CLINICAL HISTORY:  Syncope    TECHNIQUE: Real-time, color, and duplex evaluation (including peak systolic velocities and systolic velocity ratios) of the carotid arteries was performed.    COMPARISON: None    FINDINGS:  Right side: No significant plaque in the right common or right internal carotid artery.  Peak systolic velocity in the right ICA is 26 cm/s.  Systolic velocity ratio is 1.3.  Antegrade flow in the right vertebral artery.    Left side: No significant plaque in the left common or left internal carotid artery.  Peak systolic velocity in the left ICA is 111 cm/s.  Systolic velocity ratio is 1.6.  Antegrade flow in the left vertebral artery    Impression  No hemodynamically significant stenosis of either internal carotid artery.  Antegrade flow of the bilateral vertebral arteries..      Note: Validated velocity measurements with angiographic measurements, velocity criteria are extrapolated from diameter data as defined by  the Society of Radiologists in Ultrasound Consensus Conference Radiology 2003;229;340-46.    Finalized on: 5/14/2025 7:54 PM By:  Elmer Bella MD  [facility]# [id number]      2025-05-14 19:56:22.131     [facility]    Results for orders placed during the hospital encounter of 07/02/24    X-Ray Chest PA And Lateral    Narrative  EXAM:   XR CHEST PA AND LATERAL    CLINICAL HISTORY: Shortness of breath.  History of heart transplant.    COMPARISON: 12/26/2023.    TECHNIQUE: PA and lateral views    FINDINGS:  Bandlike scarring in the left midlung.  The lungs are otherwise clear. No pneumothorax.  The heart size is stable.  Post median sternotomy.  External pacer device present.    Impression  Stable chest radiograph.    Finalized on: 7/2/2024 10:44 AM By:  OLYA Arcos MD, MD  [facility]# [id number]      2024-07-02 10:46:58.507    [facility]      No results found for this or any previous visit.      No valid procedures specified.        No results found for this or any previous visit.      Results for orders placed during the hospital encounter of 04/24/25    Echo    Interpretation Summary    S/P heart transplant 1993.    Left Ventricle: The left ventricle is normal in size. Normal wall thickness. There is concentric remodeling. There is mildly reduced systolic function. Ejection fraction is approximately 45%. Mild global hypokinesis present. Contractility is better preserved in the basal segements. There is normal diastolic function.    Right Ventricle: The right ventricle is normal in size. Systolic function is moderately reduced. TAPSE is 0.9 cm.    Tricuspid Valve: There is mild to moderate regurgitation.    Pulmonary Artery: The estimated pulmonary artery systolic pressure is 28 mmHg.    IVC/SVC: Normal venous pressure at 3 mmHg.      No results found for this or any previous visit.      Lab Results   Component Value Date    HGBA1C 5.1 07/02/2024         The ASCVD Risk score (Magdalene LOPEZ, et al., 2019) failed to calculate for the following  reasons:    Risk score cannot be calculated because patient has a medical history suggesting prior/existing ASCVD    Diagnostic Results:  ECG: Reviewed    Assessment and Plan:   S/P orthotopic heart transplant  -     Echo; Future    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, unspecified vessel or lesion type  -     Discontinue: NIFEdipine (PROCARDIA-XL) 60 MG (OSM) 24 hr tablet; Take 1 tablet (60 mg total) by mouth 2 (two) times a day.  Dispense: 60 tablet; Refill: 11  -     Echo; Future  -     NIFEdipine (PROCARDIA-XL) 60 MG (OSM) 24 hr tablet; Take 1 tablet (60 mg total) by mouth 2 (two) times a day.  Dispense: 60 tablet; Refill: 11    Chronic hypertension  -     Discontinue: NIFEdipine (PROCARDIA-XL) 60 MG (OSM) 24 hr tablet; Take 1 tablet (60 mg total) by mouth 2 (two) times a day.  Dispense: 60 tablet; Refill: 11  -     Echo; Future  -     NIFEdipine (PROCARDIA-XL) 60 MG (OSM) 24 hr tablet; Take 1 tablet (60 mg total) by mouth 2 (two) times a day.  Dispense: 60 tablet; Refill: 11    Essential hypertension    Chronic diastolic (congestive) heart failure    History of heart transplant    Primary hypertension  -     Discontinue: isosorbide mononitrate (IMDUR) 60 MG 24 hr tablet; Take 1 tablet (60 mg total) by mouth once daily.  Dispense: 30 tablet; Refill: 11  -     isosorbide mononitrate (IMDUR) 60 MG 24 hr tablet; Take 1 tablet (60 mg total) by mouth once daily.  Dispense: 30 tablet; Refill: 11    Depression with anxiety    Other chest pain  -     Discontinue: isosorbide mononitrate (IMDUR) 60 MG 24 hr tablet; Take 1 tablet (60 mg total) by mouth once daily.  Dispense: 30 tablet; Refill: 11  -     isosorbide mononitrate (IMDUR) 60 MG 24 hr tablet; Take 1 tablet (60 mg total) by mouth once daily.  Dispense: 30 tablet; Refill: 11  -     Nuclear Stress - Cardiology Interpreted; Future        Assessment & Plan    Z94.1 S/P orthotopic heart transplant  I50.32 Chronic diastolic  (congestive) heart failure  I25.758 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, unspecified vessel or lesion type  I10 Chronic hypertension  F41.8 Depression with anxiety  R07.89 Other chest pain    Recent syncope event likely due to low blood counts and blood pressure rather than cardiac arrest from review of chart and impressions.  EF of 45% from recent echocardiogram is intermediately depressed but not significantly changed from previous 55%.  Decided against immediate cardiac catheterization due to risks associated W Contrast use in a patient with compromised renal function.  Will consult with Dr. Tubbs (transplant doctor) regarding potential cardiac catheterization if stress test results indicate necessity as well as her nephrologist.  Patient prefers DNR status in case of future cardiac events while not doing a heart catheterization if eventually needed.    I50.32 CHRONIC DIASTOLIC (CONGESTIVE) HEART FAILURE:  - Ordered repeat echocardiogram Follow up for echocardiogram before leaving    I25.758 CORONARY ARTERY DISEASE OF TRANSPLANTED HEART WITH STABLE ANGINA PECTORIS, UNSPECIFIED VESSEL OR LESION TYPE:  - Started isosorbide (Imdur), a long-acting nitroglycerin medication Increased nifedipine from 60 mg daily to 60 mg twice daily Ordered chemical stress test Follow up for chemical stress test before leaving    I10 CHRONIC HYPERTENSION:  - Increased nifedipine from 60 mg daily to 60 mg twice daily    R07.89 OTHER CHEST PAIN:  - Started isosorbide (Imdur), a long-acting nitroglycerin medication Ordered chemical stress test           This note was generated with the assistance of ambient listening technology. Verbal consent was obtained by the patient and accompanying visitor(s) for the recording of patient appointment to facilitate this note. I attest to having reviewed and edited the generated note for accuracy, though some syntax or spelling errors may persist. Please contact the  author of this note for any clarification.      Reviewed all tests and above medical conditions with patient in detail and formulated treatment plan.  Maintaining healthy weight and weight loss goals were discussed in clinic.    Follow up in  6 months    Visit today included increased complexity associated with the care of the episodic problem post up follow-up with continued chest pain in a patient who is on immunosuppression for heart transplant and chronic kidney disease with possible need of heart catheterization and discussed risks involved with heart catheterization and possible call us induced nephropathy along with symptoms of chest pain that are concerning especially that she recently had a either syncope or cardiac arrest with the anemia as well complicating decision for possible heart catheterization with long history also of heart transplant since 1992, we addressed managing the longitudinal care of the patient due to the serious and/or complex managed problem(s) listed above           [1]   Social History  Tobacco Use    Smoking status: Never     Passive exposure: Never    Smokeless tobacco: Never   Substance Use Topics    Alcohol use: Never     Alcohol/week: 0.0 standard drinks of alcohol    Drug use: No

## 2025-05-21 ENCOUNTER — OFFICE VISIT (OUTPATIENT)
Dept: PRIMARY CARE CLINIC | Facility: CLINIC | Age: 70
End: 2025-05-21
Payer: MEDICARE

## 2025-05-21 ENCOUNTER — PATIENT MESSAGE (OUTPATIENT)
Dept: CARDIOLOGY | Facility: HOSPITAL | Age: 70
End: 2025-05-21
Payer: MEDICARE

## 2025-05-21 ENCOUNTER — CLINICAL SUPPORT (OUTPATIENT)
Dept: PRIMARY CARE CLINIC | Facility: CLINIC | Age: 70
End: 2025-05-21
Payer: MEDICARE

## 2025-05-21 VITALS
SYSTOLIC BLOOD PRESSURE: 110 MMHG | HEART RATE: 108 BPM | HEIGHT: 62 IN | BODY MASS INDEX: 24.81 KG/M2 | OXYGEN SATURATION: 98 % | TEMPERATURE: 96 F | WEIGHT: 134.81 LBS | DIASTOLIC BLOOD PRESSURE: 60 MMHG

## 2025-05-21 DIAGNOSIS — F51.01 PRIMARY INSOMNIA: ICD-10-CM

## 2025-05-21 DIAGNOSIS — R79.89 ABNORMAL THYROID BLOOD TEST: ICD-10-CM

## 2025-05-21 DIAGNOSIS — Z71.89 ACP (ADVANCE CARE PLANNING): Primary | ICD-10-CM

## 2025-05-21 DIAGNOSIS — F41.8 DEPRESSION WITH ANXIETY: Primary | ICD-10-CM

## 2025-05-21 DIAGNOSIS — I25.758: ICD-10-CM

## 2025-05-21 DIAGNOSIS — N18.4 CKD (CHRONIC KIDNEY DISEASE), STAGE IV: ICD-10-CM

## 2025-05-21 DIAGNOSIS — M48.07 SPINAL STENOSIS OF LUMBOSACRAL REGION: Chronic | ICD-10-CM

## 2025-05-21 PROBLEM — Z51.5 PALLIATIVE CARE ENCOUNTER: Status: RESOLVED | Noted: 2025-04-30 | Resolved: 2025-05-21

## 2025-05-21 PROBLEM — E83.41 HYPERMAGNESEMIA: Status: RESOLVED | Noted: 2025-05-03 | Resolved: 2025-05-21

## 2025-05-21 PROBLEM — R00.2 RAPID PALPITATIONS: Status: RESOLVED | Noted: 2024-10-16 | Resolved: 2025-05-21

## 2025-05-21 PROBLEM — Z97.8 ENDOTRACHEALLY INTUBATED: Status: RESOLVED | Noted: 2025-04-28 | Resolved: 2025-05-21

## 2025-05-21 PROBLEM — N17.9 AKI (ACUTE KIDNEY INJURY): Status: RESOLVED | Noted: 2025-05-02 | Resolved: 2025-05-21

## 2025-05-21 PROBLEM — I95.9 HYPOTENSION: Status: RESOLVED | Noted: 2019-01-09 | Resolved: 2025-05-21

## 2025-05-21 PROBLEM — N30.00 ACUTE CYSTITIS WITHOUT HEMATURIA: Status: RESOLVED | Noted: 2022-05-10 | Resolved: 2025-05-21

## 2025-05-21 PROBLEM — R60.0 LOWER EXTREMITY EDEMA: Chronic | Status: RESOLVED | Noted: 2024-12-11 | Resolved: 2025-05-21

## 2025-05-21 PROBLEM — Z51.5 ENCOUNTER FOR PALLIATIVE CARE: Status: RESOLVED | Noted: 2024-10-14 | Resolved: 2025-05-21

## 2025-05-21 PROBLEM — R55 SYNCOPE: Status: RESOLVED | Noted: 2025-05-13 | Resolved: 2025-05-21

## 2025-05-21 PROBLEM — N17.9 ACUTE KIDNEY INJURY SUPERIMPOSED ON STAGE 4 CHRONIC KIDNEY DISEASE: Status: RESOLVED | Noted: 2025-05-13 | Resolved: 2025-05-21
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
## 2025-05-21 PROCEDURE — 99215 OFFICE O/P EST HI 40 MIN: CPT | Mod: HCNC,S$GLB,, | Performed by: INTERNAL MEDICINE

## 2025-05-21 PROCEDURE — 3074F SYST BP LT 130 MM HG: CPT | Mod: CPTII,HCNC,S$GLB, | Performed by: INTERNAL MEDICINE

## 2025-05-21 PROCEDURE — 1101F PT FALLS ASSESS-DOCD LE1/YR: CPT | Mod: CPTII,HCNC,S$GLB, | Performed by: INTERNAL MEDICINE

## 2025-05-21 PROCEDURE — 3288F FALL RISK ASSESSMENT DOCD: CPT | Mod: CPTII,HCNC,S$GLB, | Performed by: INTERNAL MEDICINE

## 2025-05-21 PROCEDURE — 3066F NEPHROPATHY DOC TX: CPT | Mod: CPTII,HCNC,S$GLB, | Performed by: INTERNAL MEDICINE

## 2025-05-21 PROCEDURE — 1111F DSCHRG MED/CURRENT MED MERGE: CPT | Mod: CPTII,HCNC,S$GLB, | Performed by: INTERNAL MEDICINE

## 2025-05-21 PROCEDURE — 3008F BODY MASS INDEX DOCD: CPT | Mod: CPTII,HCNC,S$GLB, | Performed by: INTERNAL MEDICINE

## 2025-05-21 PROCEDURE — 99999 PR PBB SHADOW E&M-EST. PATIENT-LVL IV: CPT | Mod: PBBFAC,HCNC,, | Performed by: INTERNAL MEDICINE

## 2025-05-21 PROCEDURE — 99417 PROLNG OP E/M EACH 15 MIN: CPT | Mod: HCNC,S$GLB,, | Performed by: INTERNAL MEDICINE

## 2025-05-21 PROCEDURE — 3078F DIAST BP <80 MM HG: CPT | Mod: CPTII,HCNC,S$GLB, | Performed by: INTERNAL MEDICINE

## 2025-05-21 PROCEDURE — 1159F MED LIST DOCD IN RCRD: CPT | Mod: CPTII,HCNC,S$GLB, | Performed by: INTERNAL MEDICINE

## 2025-05-21 PROCEDURE — 1125F AMNT PAIN NOTED PAIN PRSNT: CPT | Mod: CPTII,HCNC,S$GLB, | Performed by: INTERNAL MEDICINE

## 2025-05-21 PROCEDURE — 1123F ACP DISCUSS/DSCN MKR DOCD: CPT | Mod: CPTII,HCNC,S$GLB, | Performed by: INTERNAL MEDICINE

## 2025-05-21 PROCEDURE — 99497 ADVNCD CARE PLAN 30 MIN: CPT | Mod: HCNC,S$GLB,, | Performed by: INTERNAL MEDICINE

## 2025-05-21 PROCEDURE — 99499 UNLISTED E&M SERVICE: CPT | Mod: HCNC,S$GLB,,

## 2025-05-21 RX ORDER — TEMAZEPAM 30 MG/1
30 CAPSULE ORAL NIGHTLY PRN
Qty: 30 CAPSULE | Refills: 1 | Status: SHIPPED | OUTPATIENT
Start: 2025-05-21

## 2025-05-21 NOTE — PROGRESS NOTES
Nadia Damon  05/21/2025  [id number]    Elis Wick MD  Patient Care Team:  Elis Wick MD as PCP - General (Internal Medicine)  Miguel Soni Jr., MD as Consulting Physician (Vascular Surgery)  Ike King MD as Consulting Physician (Cardiology)  Courtney Tubbs MD as Consulting Physician (Cardiology)  Carter Crawford MD as Consulting Physician (Nephrology)  Paxton Vasques OD as Consulting Physician (Optometry)  PRANAY Villalobos MD as Obstetrician (Obstetrics)  Ike King MD as Consulting Physician (Cardiology)  Jose Roland MD as Consulting Physician (Dermatology)  Prasanth Johnson MD as Consulting Physician (Rheumatology)  Elis Wick MD as Physician (Internal Medicine)  Lexi Bianchi LCSW as  (Hematology and Oncology)  Kelsie Burk PA-C as Physician Assistant (Hematology and Oncology)  Chelsea Monte as ED Navigator  Anna Regalado LMSW as  (Hematology and Oncology)    Transitional Care Note    Family and/or Caretaker present at visit?  No.  Diagnostic tests reviewed/disposition: I have reviewed all completed as well as pending diagnostic tests at the time of discharge.  Disease/illness education: Yes  Home health/community services discussion/referrals: Patient has home health established at [facility].   Establishment or re-establishment of referral orders for community resources: No other necessary community resources at this time. Has appt w Palliative Care NP [place] next week.   Discussion with other health care providers: No discussion with other health care providers necessary.     Visit Type: Hospital follow-up    Ms. Damon is a 69 year old female here for scheduled f/u.     Ms. Damon w h/o heart transplant 1993, on anti-rejection medication.   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 She was treated with 1 cycle of systemic  chemotherapy cytoxan and taxotere and udenyca that was discontinued due to toxicity. She has been followed by radiation oncology and completed last radiation treatment 5/14/22. 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       Ms. Damon suffered CVA 7/19/24 with subsequent hospitalization then transfer to [facility] to address on-going dysarthria and R-sided weakness. She also has severe multi-level spinal stenosis.     Recent admission 4/24-5/06/25 due to cardiac arrest with unknown rhythm requiring CPR with 1x round of epi after which LHC was deferred due to renal function and prior negative cardiac PET in 11/2024. She was readmitted  5/12-5/18 with symptomatic hypotension and evidence of myocardial ischemia as well as GRACE on CKD which was felt possibly in setting of anemia at that time. She received transfusions with noted improvement of GRACE and trops.      Other chronic medical issues include depresssion/anxiety/insomnia, hypertension, chronic diastolic CHF, ventral abdominal hernia, neurogenic bladder, and h/o osteoporosis for which she had been receiving Prolia.     Advance Care Planning   Date: 05/21/2025  ACP Reviewed/No Changes  Voluntary advance care planning discussion had today with patient. Previously completed HCPOA and LW from Jan 2021 in electronic medical record is current, no changes made.    Advance Care Planning  Code Status  In light of the patients advanced and terminal illness, we reviewed what the patients preferences for care would be at the very end of life.  The patient wishes to have a natural, peaceful death.  Along those lines, the patient does not wish to have CPR or other invasive treatments performed when her heart and/or breathing stops. I communicated to the patient that a DNR order would be placed in her medical record to reflect this preference.  A [place] form was completed to reflect other EOL  preferences of the patient such as: considering dialysis, in agreement w ICU admission and pressor support, in agreement with IVF. Declines artificial nutrition by tube. Declines intubation/mechanical ventilation.  I spent a total of 30 minutes engaging the patient in this advance care planning discussion.       History of Present Illness    CHIEF COMPLAINT:  Ms. Damon presents today for follow up after recent hospitalization.    MEDICAL HISTORY:  She has a history of heart transplant and chronic kidney disease stage four/five. Her spinal stenosis symptoms have improved following injections with significant reduction in pain. She describes her chronic back pain as present but significantly improved compared to before. She still experiences anterior chest wall tightness but less so with recent addition of imdur.    CURRENT MEDICATIONS:  She takes Temazepam for insomnia with good effect, Zanaflex twice daily as a muscle relaxer with pain relief and no drowsiness, and Tylenol.        PHQ-4 Score: 0     From LOV O65+ 5/12/25  CHIEF COMPLAINT:  Nadia presents today for follow up after recent hospitalization in [place] with cardiac arrest.  RECENT CARDIAC EVENT:  She experienced severe chest pain while using the bathroom during recent hospitalization in [place]. The pain progressively worsened, accompanied by dizziness, leading to cardiac arrest requiring CPR. Medical team initially had difficulty obtaining a pulse despite prolonged resuscitation efforts, but eventually succeeded.  CURRENT SYMPTOMS:  She reports chest and rib pain rated as 8/10, wrapping around to the back. She experiences stuttering when in pain, which was addressed in recent speech therapy. She reports episodes of hypotension, with one episode measuring 66/40 mmHg associated with dizziness, compared to her usual blood pressure of 130-140/80.  MEDICAL HISTORY:  She has a history of stroke last year and kidney disease. She is not a candidate  for transplant due to extent of kidney damage. She has an abdominal hernia being monitored.   CURRENT MEDICATIONS:  She takes Carvedilol 3.125 mg twice daily with food, Lasix 40 mg daily, and Nifedipine 60 mg. She reports medication compliance.  HOME HEALTH SERVICES:  She receives home health services including nursing care, physical therapy, and speech therapy. Physical therapy instructed her to use caution when standing up, hold onto support, and use a pillow for support during bathroom visits.  MENTAL HEALTH:  She reports feeling depressed about her current medical situation. She has decided to be DNR status after discussions with her son and medical team regarding her prognosis.     Status: Signed   Report called to [facility] given to RENETTA Noel. Called EMS for ETA reports that they are enroute and d/t heavy traffic and or being pulled to another call she is unable to provide one at this time.       Electronically signed by Rohini Diaz RN at 5/12/2025  5:07 PM     Status: Signed   Upon leaving facility, staff were called outside d/t pt becoming weak. Pt placed in wheelchair and placed in treatment room. VS taken initial BP 99/56 with HR of 83 98% on RA. Water provided upon pt's request. Pt denied the EMS being called.       Electronically signed by Rohini Diaz RN at 5/12/2025  4:38 PM     Status: Addendum   Pt became more lethargic, arousal to voice and touch. Manual BP taken 90/42,  assessing pt, informed pt that EMS will be called at this time. Pt verbalized understanding.   ------------------------------------------------------------------------------------------  Pt drinking water, awake and talking but still feeling weak, tired, light-headed. Denies CP, palpitation, SOB. No appetite but denies nausea.   Repeat manual BP 80/40?  EMS arrived and manually transferred pt from transport chair to stretcher for transport to [facility] per pt request.     Electronically  signed by Elis Wick MD at 5/12/2025  5:25 PM         Hosp/ED/Urgent Care:  5/17-5/18/25 Hosp transplant  5/12-5/17/25 Hosp hypotension  4/24-5/06/25 Hosp cardiac arrest    Recent appointments:   5/20/25 Kasandra Ross    Upcoming appointments:  Future Appointments       Date Provider Specialty Appt Notes    5/22/2025 Yudith Mendoza NP Hematology and Oncology Anemia 4wk/lab prior/retacrit// [facility]    5/22/2025  Chemotherapy retacrit after np/ hm REF [id number]    5/27/2025 Gunner Melgar MD Nephrology EP/6 week f/u//[facility]    5/28/2025 Luz Dickson NP Palliative Medicine [facility] 3 mth fu    5/29/2025 Jassi Pierre MD Pain Medicine RFA follow up    6/4/2025 Elis Wick MD Primary Care Two week f/u    6/9/2025 Paxton Vasques, OD Ophthalmology annual    6/17/2025 Génesis Gonzales LCSW Primary Care NEEDS LYFT - LCSW    10/13/2025 Christoph Douglas MD Surgery 6mth f/u//hernia//tot   The following were reviewed: Active problem list, medication list, allergies, family history, social history, and Health Maintenance.     Medications have been reviewed and reconciled with patient at visit today.    Exam: sat 97%  Vitals:    05/21/25 1101   BP: 110/60   Pulse: 108   Temp: 96.2 °F (35.7 °C)     Weight: 61.1 kg (134 lb 13 oz)   Body mass index is 24.66 kg/m².    BP Readings from Last 3 Encounters:   05/21/25 110/60   05/20/25 (!) 171/101   05/18/25 (!) 152/85      Wt Readings from Last 3 Encounters:   05/21/25 1101 61.1 kg (134 lb 13 oz)   05/20/25 1602 60.8 kg (134 lb 0.6 oz)   05/17/25 0149 58.4 kg (128 lb 12 oz)      Physical Exam  Vitals reviewed.   Constitutional:       General: She is not in acute distress.     Appearance: Normal appearance.   HENT:      Head: Normocephalic and atraumatic.      Right Ear: External ear normal.      Left Ear: External ear normal.      Nose: Nose normal.      Mouth/Throat:      Mouth: Mucous membranes are moist.      Pharynx: Oropharynx is clear.   Eyes:       General: No scleral icterus.     Extraocular Movements: Extraocular movements intact.      Conjunctiva/sclera: Conjunctivae normal.      Comments: glasses   Cardiovascular:      Rate and Rhythm: Normal rate.   Pulmonary:      Effort: Pulmonary effort is normal. No respiratory distress.   Musculoskeletal:      Right lower leg: No edema.      Left lower leg: No edema.   Skin:     General: Skin is warm and dry.   Neurological:      Mental Status: She is alert and oriented to person, place, and time. Mental status is at baseline.      Gait: Gait abnormal.      Comments: Ambulating w rollator for support   Psychiatric:         Mood and Affect: Mood normal.         Behavior: Behavior normal.         Thought Content: Thought content normal.         Judgment: Judgment normal.        Laboratory Reviewed  Lab Results   Component Value Date    WBC 3.77 (L) 05/20/2025    HGB 10.3 (L) 05/20/2025    HCT 32.0 (L) 05/20/2025     05/20/2025    MCV 90 05/20/2025    CHOL 196 04/25/2025    TRIG 192 (H) 04/25/2025    HDL 59 04/25/2025    LDLCALC 98.6 04/25/2025    ALT 28 05/18/2025    AST 39 05/18/2025     05/18/2025    K 4.6 05/18/2025     05/18/2025    CREATININE 2.8 (H) 05/18/2025    BUN 36 (H) 05/18/2025    CO2 24 05/18/2025    MG 1.9 05/18/2025    TSH 12.101 (H) 04/25/2025    FREET4 0.87 05/13/2025    PSA <0.1 05/27/2008    INR 0.9 05/07/2025    HGBA1C 5.1 07/02/2024    CRP 14.4 (H) 08/24/2023     Lab Results   Component Value Date    .3 (H) 03/25/2025    CALCIUM 8.8 05/18/2025    CAION 1.13 09/05/2018    PHOS 3.9 05/18/2025      Lab Results   Component Value Date    LJYGMIZZ16 1,254 (H) 05/17/2025     Lab Results   Component Value Date    FOLATE 16.7 05/17/2025      Lab Results   Component Value Date    IRON 61 05/20/2025    TRANSFERRIN 234 05/20/2025    TIBC 346 05/20/2025    LABIRON 18 (L) 05/20/2025    FESATURATED 20 02/24/2025      Lab Results   Component Value Date    EGFRNORACEVR 18 (L) 05/18/2025     ALBUMIN 2.8 (L) 05/18/2025     (H) 05/12/2025     Lab Results   Component Value Date    DNEBCNIL03KP 50 03/25/2025        Assessment:   69 y.o. female with multiple co-morbid illnesses here for continued follow up of medical problems.      The primary encounter diagnosis was ACP (advance care planning). 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      Plan:   1. ACP (advance care planning)  Assessment & Plan:  Prior ACP documents reviewed - no change  Reviewed prior changes to code status  LAPOST completed today    Orders:  -     Dnr (do not resuscitate)    2. Primary insomnia  -     temazepam (RESTORIL) 30 mg capsule; Take 1 capsule (30 mg total) by mouth nightly as needed for Insomnia.  Dispense: 30 capsule; Refill: 1    3. Spinal stenosis of lumbosacral region  Overview:  - Imaging w/ severe degenerative disc disease with high-grade disc narrowing at T11 and T12, bulge at L4-5 with foraminal stenosis  - Outpatient follow-up with neurosurgery as recommended at [facility]      Assessment & Plan:  Reports decreased back pain since prior hospitalization - renal dose pregablin/tizanidine   Followed by interventional pain med      4. 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 CKD (chronic kidney disease), stage IV  Assessment & Plan:  Maintain close f/u w Nephrology - considering if wants to proceed w HD preparation    Orders:  -     Dnr (do not resuscitate)    6. Abnormal thyroid blood test  Assessment & Plan:  No current rx - free T4 wnl - Plan for repeat  TFTs June or July? - make sure to HOLD biotin prior if taking         Health Maintenance         Date Due Completion Date    RSV Vaccine (Age 60+ and Pregnant patients) (1 - Risk 60-74 years 1-dose series) Never done     Mammogram 04/18/2025 4/18/2024    Colorectal Cancer Screening 02/25/2026 2/25/2021    Lipid Panel 04/25/2026 4/25/2025    DEXA Scan 07/03/2027 7/3/2024    TETANUS VACCINE 09/09/2030 9/9/2020     -Patient's lab results were reviewed and discussed with patient  -Treatment options and alternatives were discussed with the patient. Patient expressed understanding. Patient was given the opportunity to ask questions and be an active participant in their medical care. Patient had no further questions or concerns at this time.     Follow up: Follow up in about 2 weeks (around 6/4/2025) for Follow Up w me (Wed).    After visit summary printed and given to patient upon discharge.  Patient care plan included in After visit summary.    TOTAL TIME evaluating and managing this patient for this encounter was 120 minutes. This time was spent personally by me on some of the following activities: review of patient's past medical history, assessing age-appropriate health maintenance needs, review of any interval history, review and interpretation of lab results, review and interpretation of imaging test results, review and interpretation of cardiology test results, reviewing consulting specialist notes, obtaining history from the patient and family, examination of the patient, medication reconciliation, managing and/or ordering prescription medications, ordering imaging tests, ordering referral to subspecialty provider(s), educating patient and answering their questions about diagnosis, treatment plan, and goals of treatment, discussing planned follow-up and final documentation of the visit. This time was exclusive of any separately billable procedures for this patient and exclusive of time spent treating any other  patients.     This note was generated with the assistance of ambient listening technology. Verbal consent was obtained by the patient and accompanying visitor(s) for the recording of patient appointment to facilitate this note. I attest to having reviewed and edited the generated note for accuracy, though some syntax or spelling errors may persist. Please contact the author of this note for any clarification.

## 2025-05-21 NOTE — ASSESSMENT & PLAN NOTE
Reports decreased back pain since prior hospitalization - renal dose pregablin/tizanidine   Followed by interventional pain med

## 2025-05-21 NOTE — ASSESSMENT & PLAN NOTE
Prior ACP documents reviewed - no change  Reviewed prior changes to code status  LAPOST completed today
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
## 2025-05-21 NOTE — PATIENT INSTRUCTIONS
If you are feeling unwell, we'd like to be the first ones to know here at [facility]! Please give us a call. Same day appointments are our top priority to keep you well and out of the emergency rooms and hospitals. Call [phone] for our direct line. After hours advice is always available. Please call [phone] after hours to speak to the on-call team.

## 2025-05-22 ENCOUNTER — OFFICE VISIT (OUTPATIENT)
Dept: HEMATOLOGY/ONCOLOGY | Facility: CLINIC | Age: 70
End: 2025-05-22
Payer: MEDICARE

## 2025-05-22 VITALS
WEIGHT: 137.81 LBS | SYSTOLIC BLOOD PRESSURE: 125 MMHG | BODY MASS INDEX: 25.36 KG/M2 | HEIGHT: 62 IN | OXYGEN SATURATION: 98 % | HEART RATE: 104 BPM | DIASTOLIC BLOOD PRESSURE: 71 MMHG | TEMPERATURE: 97 F

## 2025-05-22 DIAGNOSIS — N18.5 ANEMIA ASSOCIATED WITH STAGE 5 CHRONIC RENAL FAILURE: Primary | Chronic | ICD-10-CM

## 2025-05-22 DIAGNOSIS — D05.12 DUCTAL CARCINOMA IN SITU (DCIS) OF LEFT BREAST: ICD-10-CM

## 2025-05-22 DIAGNOSIS — D63.1 ANEMIA ASSOCIATED WITH STAGE 5 CHRONIC RENAL FAILURE: Primary | Chronic | ICD-10-CM

## 2025-05-22 PROCEDURE — 99999 PR PBB SHADOW E&M-EST. PATIENT-LVL IV: CPT | Mod: PBBFAC,HCNC,, | Performed by: NURSE PRACTITIONER

## 2025-05-22 NOTE — ASSESSMENT & PLAN NOTE
-H/H stable  -Hg 10.3. Most recent iron levels adequate    Hold Retacrit. F/u 4 weeks with CBC iron ferritin for consideration of Retacrit if hg <10

## 2025-05-22 NOTE — PROGRESS NOTES
Subjective:       Patient ID: Nadia Damon is a 69 y.o. female.    Chief Complaint: review labs. Consideration of Retacrit    HPI: 69 y.o female with h/o of CVA 2024, heart transplant in  (on anti-rejection medication), CKD V, triple negative breast ca with lymph node involovement. Initiation of chemotherapy 2021 (Taxotere/Cytoxan) with Udenyca 2021. Unfortunately chemotherapy was complicated by pancytopenia and neutropenic fever resulting in hospitalization x 2. Patient decided on no further chemotherapy. S/p radiation completed 2022    10/31/2022 underwent right breast biopsy due to abnormal findings on mammogram with benign pathology. She continues follow up with breast cancer survivorship     Today she is here for lab review and consideration for EPO therapy for anemia of CKD. Interval hospitalization notes reviewed  Social History     Socioeconomic History    Marital status: Single    Number of children: 2    Highest education level: 11th grade   Occupational History    Occupation: Retired   Tobacco Use    Smoking status: Never     Passive exposure: Never    Smokeless tobacco: Never   Substance and Sexual Activity    Alcohol use: Never     Alcohol/week: 0.0 standard drinks of alcohol    Drug use: No    Sexual activity: Not Currently     Partners: Male     Birth control/protection: See Surgical Hx   Other Topics Concern    Are you pregnant or think you may be? No    Breast-feeding No   Social History Narrative    Single. 2 children , 1  at 31 yoa  2014 strep throat -  pneumonia and renal complications after not completing course of AB. Other child lives in [place], [place]. Has a cousin locally that could help in an emergency. 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 Caffeine intake =- 1 cola a day. No coffee, + occasional tea, avoids caffeine especially at night. Still drives. She does not have a Living Will or Advanced directive.      Social Drivers of  Health     Financial Resource Strain: Low Risk  (5/17/2025)    Overall Financial Resource Strain (CARDIA)     Difficulty of Paying Living Expenses: Not hard at all   Recent Concern: Financial Resource Strain - Medium Risk (5/2/2025)    Overall Financial Resource Strain (CARDIA)     Difficulty of Paying Living Expenses: Somewhat hard   Food Insecurity: No Food Insecurity (5/17/2025)    Hunger Vital Sign     Worried About Running Out of Food in the Last Year: Never true     Ran Out of Food in the Last Year: Never true   Transportation Needs: No Transportation Needs (5/17/2025)    PRAPARE - Transportation     Lack of Transportation (Medical): No     Lack of Transportation (Non-Medical): No   Recent Concern: Transportation Needs - Unmet Transportation Needs (5/2/2025)    PRAPARE - Transportation     Lack of Transportation (Medical): Yes     Lack of Transportation (Non-Medical): No   Physical Activity: Inactive (5/17/2025)    Exercise Vital Sign     Days of Exercise per Week: 0 days     Minutes of Exercise per Session: 0 min   Stress: No Stress Concern Present (5/17/2025)    [employer] - Occupational Stress Questionnaire     Feeling of Stress : Only a little   Recent Concern: Stress - Stress Concern Present (5/13/2025)    [employer] - Occupational Stress Questionnaire     Feeling of Stress : To some extent   Housing Stability: High Risk (5/17/2025)    Housing Stability Vital Sign     Unable to Pay for Housing in the Last Year: No     Number of Times Moved in the Last Year: 4     Homeless in the Last Year: No       Past Medical History:   Diagnosis Date    Abdominal wall hernia     CT Renal 6/11/2018---Small fat containing superior ventral abdominal wall hernia at the epicardial pacing lead site.    Abnormal mammogram 10/12/2021    Acute cystitis without hematuria 05/10/2022    Acute ischemic right middle cerebral artery (MCA) stroke 07/20/2024    Adverse drug  reaction 11/12/2024    GRACE (acute kidney injury) 11/22/2021    Anxiety     Aphasia 07/19/2024    Arthritis     ZEN HIPS    Breast cancer in female 08/2021    LEFT BREAST    Breast mass, right 11/13/2024    RIGHT BREAST MASS (Problem)      Bronchitis 08/18/2016    Never smoked      C. difficile colitis 11/29/2021    Cellulitis of axilla, left 12/23/2021    Chronic diastolic heart failure 12/16/2021    Chronic kidney disease     stage 4, GFR 15-29 ml/min    Chronic midline low back pain without sciatica 06/18/2018    CKD (chronic kidney disease) stage 4, GFR 15-29 ml/min 04/20/2016    US Retro   5/16/2018---Mild cortical thinning and increased cortical echogenicity.  Findings can be seen with medical renal disease.  8/31/216--- Echogenic kidneys with diffuse cortical thinning suggesting  medical renal disease. 2 complex right renal cortical cysts.      Closed nondisplaced fracture of distal phalanx of left great toe with routine healing 10/22/2018    Coronary artery disease 1993    heart transplant    COVID-19 in immunocompromised patient 02/26/2024    Cystitis 05/10/2022    Depression     Encounter for blood transfusion     Encounter for palliative care 10/14/2024    Endotracheally intubated 04/28/2025    Fibromyalgia     on Lyrica    Heart failure     native heart cardiomyopathy    Heart transplanted 1993    due to cardiomyopathy    History of hyperparathyroidism; Hyperparathyroidism, secondary renal     PT DENIES    Hypertension     Immune disorder     anti rejection meds    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    Impaired mobility 07/28/2022    Iron deficiency anemia 08/15/2017    Kidney stones     passed per pt    Lower extremity edema 12/11/2024    Multifactoral: CKD stage 5, CHF, venous insufficiency      Obesity     Obesity (BMI 30.0-34.9) 07/22/2019    Other osteoporosis without current pathological fracture 08/30/2019    Other pancytopenia 05/06/2024    Palliative care encounter  04/30/2025    Parotitis, acute 09/19/2023    Pharyngitis 01/09/2019    Pneumonia due to infectious organism 03/14/2024    PONV (postoperative nausea and vomiting)     Rapid palpitations 10/16/2024    Severe sepsis 11/22/2021    Shingles 2003 approx    left leg    Stage 4 chronic kidney disease 11/22/2021    Subclinical hypothyroidism 06/16/2023    Syncope 05/13/2025    Thrombocytopenia, unspecified 11/29/2021    Trouble in sleeping     Unresponsiveness 11/13/2024    Urinary incontinence        Family History   Problem Relation Name Age of Onset    Cancer Mother  38        breast    Breast cancer Mother      Breast cancer Maternal Grandmother      Heart disease Maternal Grandmother      Hypertension Son      Cataracts Cousin      Diabetes Neg Hx      Stroke Neg Hx      Kidney disease Neg Hx      Asthma Neg Hx      COPD Neg Hx      Melanoma Neg Hx      Hyperlipidemia Neg Hx         Past Surgical History:   Procedure Laterality Date    bladder implant Right 06/11/2024    BLADDER SURGERY  2015 approx    mesh - Dr Everett then 2nd reconstructive sx Dr Onofre    BREAST BIOPSY Bilateral     NEGATIVE    BREAST BIOPSY Right 10/31/2022    benign    BREAST LUMPECTOMY Left 2021    BREAST SURGERY Left 09/28/2015    Bx - benign    BREAST SURGERY Right 12/2015    Bx benign    CARDIAC PACEMAKER REMOVAL Left 06/26/2014    Pacer defirillator removed. Put in 1993 aat time of heart transplant    CARPAL TUNNEL RELEASE Left 03/03/2015    Dr. Hall    COLONOSCOPY N/A 02/25/2021    Procedure: COLONOSCOPY;  Surgeon: Freida Ramirez MD;  Location: [facility];  Service: Endoscopy;  Laterality: N/A;    CYSTOCELE REPAIR      Twice with mesh removal    ECHOCARDIOGRAM,TRANSESOPHAGEAL N/A 4/26/2025    Procedure: Transesophageal echo (AYAAN) intra-procedure log documentation;  Surgeon: Barron Chinchilla MD;  Location: [facility] OR [address] [place] [address];  Service: Cardiothoracic;  Laterality: N/A;    EPIDURAL STEROID INJECTION INTO CERVICAL SPINE N/A 02/02/2023     Procedure: T11/T12 IL HELLEN;  Surgeon: Jassi Pierre MD;  Location: [facility];  Service: Pain Management;  Laterality: N/A;    EPIDURAL STEROID INJECTION INTO CERVICAL SPINE N/A 9/16/2024    Procedure: T11/T12 IL HELLEN;  Surgeon: Jassi Pierre MD;  Location: [facility];  Service: Pain Management;  Laterality: N/A;    HEART TRANSPLANT  1993    HERNIA REPAIR Right 1971 approx    Inguinal    HYSTERECTOMY  1983    vag hyst /LSO     IMPLANTATION OF PERMANENT SACRAL NERVE STIMULATOR N/A 06/11/2024    Procedure: INSERTION, NEUROSTIMULATOR, PERMANENT, SACRAL;  Surgeon: Sami Cochran MD;  Location: [facility] OR;  Service: Urology;  Laterality: N/A;    INCISION AND DRAINAGE OF ABSCESS Left 12/24/2021    Procedure: INCISION AND DRAINAGE, ABSCESS;  Surgeon: Joseph Longo MD;  Location: [facility] OR;  Service: General;  Laterality: Left;    INJECTION OF ANESTHETIC AGENT AROUND MEDIAL BRANCH NERVES INNERVATING LUMBAR FACET JOINT Right 10/19/2022    Procedure: Right L4/L5 and L5/S1 MBB;  Surgeon: Jassi Pierre MD;  [facility];  Service: Pain Management;  Laterality: Right;    INJECTION OF ANESTHETIC AGENT AROUND MEDIAL BRANCH NERVES INNERVATING LUMBAR FACET JOINT Right 11/09/2022    Procedure: Right L4/L5 and L5/S1 MBB;  Surgeon: Jassi Pierre MD;  Location: [facility];  Service: Pain Management;  Laterality: Right;    INJECTION OF ANESTHETIC AGENT AROUND MEDIAL BRANCH NERVES INNERVATING LUMBAR FACET JOINT Left 2/6/2025    Procedure: Left L4-5 and L5-S1 MBB #1 with local;  Surgeon: Jassi Pierre MD;  Location: [facility];  Service: Pain Management;  Laterality: Left;    INJECTION OF ANESTHETIC AGENT AROUND MEDIAL BRANCH NERVES INNERVATING LUMBAR FACET JOINT Left 3/19/2025    Procedure: Left L4-5 and L5-S1 MBB #2 with local;  Surgeon: Jassi Pierre MD;  Location: [facility];  Service: Pain Management;  Laterality: Left;    INJECTION OF ANESTHETIC AGENT INTO SACROILIAC JOINT  Right 08/22/2022    Procedure: Right SIJ Injection Right L5/S1 Facte Injection;  Surgeon: Jassi Pierre MD;  Location: [facility];  Service: Pain Management;  Laterality: Right;    INSERTION OF TUNNELED CENTRAL VENOUS CATHETER (CVC) WITH SUBCUTANEOUS PORT N/A 11/09/2021    Procedure: GOUITZMEI-YCLJ-D-CATH;  Surgeon: Christoph Douglas MD;  Location: [facility] OR;  Service: General;  Laterality: N/A;    RADIOFREQUENCY ABLATION Right 12/5/2024    Procedure: Right L4-5 and L5-S1 RFA;  Surgeon: Jassi Pierre MD;  Location: [facility];  Service: Pain Management;  Laterality: Right;    RADIOFREQUENCY THERMOCOAGULATION Right 12/07/2022    Procedure: Right L4/L5 and L5/S1 Lumbar RFA;  Surgeon: Jassi Pierre MD;  [facility];  Service: Pain Management;  Laterality: Right;    REMOVAL OF ELECTRODE LEAD OF SACRAL NERVE STIMULATOR N/A 2/18/2025    Procedure: REMOVAL, ELECTRODE LEAD, SACRAL NERVE STIMULATOR;  Surgeon: Sami Cochran MD;  Location: [facility] OR;  Service: Urology;  Laterality: N/A;    REMOVAL OF VASCULAR ACCESS PORT      ROBOT-ASSISTED LAPAROSCOPIC ABDOMINAL SACROCOLPOPEXY N/A 08/10/2023    Procedure: ROBOTIC SACROCOLPOPEXY, ABDOMEN;  Surgeon: PRANAY Villalobos MD;  Location: [facility] OR;  Service: OB/GYN;  Laterality: N/A;    ROBOT-ASSISTED LAPAROSCOPIC OOPHORECTOMY Right 08/10/2023    Procedure: ROBOTIC OOPHORECTOMY;  Surgeon: PRANAY Villalobos MD;  Location: [facility] OR;  Service: OB/GYN;  Laterality: Right;    SENTINEL LYMPH NODE BIOPSY Left 10/12/2021    Procedure: BIOPSY, LYMPH NODE, SENTINEL;  Surgeon: Christoph Douglas MD;  Location: [facility] OR;  Service: General;  Laterality: Left;    TOE SURGERY      XI ROBOTIC URETHROPEXY N/A 08/10/2023    Procedure: XI ROBOTIC URETHROPEXY;  Surgeon: PRANAY Villalobos MD;  Location: [facility] OR;  Service: OB/GYN;  Laterality: N/A;       Review of Systems   Constitutional:  Negative for activity change, appetite change, chills, fatigue, fever and unexpected  weight change.   HENT:  Negative for congestion, mouth sores, nosebleeds, sore throat, trouble swallowing and voice change.    Eyes:  Negative for visual disturbance.   Respiratory:  Negative for cough, chest tightness, shortness of breath and wheezing.    Cardiovascular:  Negative for chest pain, palpitations and leg swelling.   Gastrointestinal:  Negative for abdominal distention, abdominal pain, anal bleeding, blood in stool, constipation, diarrhea, nausea and vomiting.   Genitourinary:  Negative for difficulty urinating, dysuria and hematuria.   Musculoskeletal:  Positive for back pain (sees pain management). Negative for arthralgias and myalgias. Gait problem: utizlies cane, s/p CVA 7/2024.       Breast pain s/p mammogram   Skin:  Negative for pallor, rash and wound.   Neurological:  Negative for dizziness, syncope, weakness and headaches.   Hematological:  Negative for adenopathy. Does not bruise/bleed easily.   Psychiatric/Behavioral:  The patient is not nervous/anxious.          Medication List with Changes/Refills   Current Medications    ASPIRIN (ECOTRIN) 81 MG EC TABLET    Take 1 tablet (81 mg total) by mouth once daily.    CALCITRIOL (ROCALTROL) 0.25 MCG CAP    Take 1 capsule (0.25 mcg total) by mouth once daily.    CARVEDILOL (COREG) 3.125 MG TABLET    TAKE 1 TABLET TWICE DAILY WITH MEALS    CYCLOSPORINE (NEORAL) 100 MG/ML MICROEMULSION SOLUTION    Take 0.5 mLs (50 mg total) by mouth every 12 (twelve) hours.    FUROSEMIDE (LASIX) 40 MG TABLET    Take ½ tablet (20 mg total) by mouth once daily.    ISOSORBIDE MONONITRATE (IMDUR) 60 MG 24 HR TABLET    Take 1 tablet (60 mg total) by mouth once daily.    NIFEDIPINE (PROCARDIA-XL) 60 MG (OSM) 24 HR TABLET    Take 1 tablet (60 mg total) by mouth 2 (two) times a day.    NITROGLYCERIN (NITROSTAT) 0.4 MG SL TABLET    PLACE 1 TABLET UNDER THE TONGUE EVERY 5 MINS AS NEEDED FOR CHEST PAIN FOR UP TO 3 DOSES.IF CONTINUED HEART PAIN/PRESSURE AFTER    ONDANSETRON  (ZOFRAN) 4 MG TABLET    TAKE 1 TABLET EVERY 12 HOURS AS NEEDED FOR NAUSEA    PANTOPRAZOLE (PROTONIX) 40 MG TABLET    Take 1 tablet (40 mg total) by mouth once daily.    POLYETHYLENE GLYCOL (GLYCOLAX) 17 GRAM PWPK    Take 17 g by mouth once daily.    PREGABALIN (LYRICA) 50 MG CAPSULE    Take 1 capsule (50 mg total) by mouth every evening.    SERTRALINE (ZOLOFT) 25 MG TABLET    Take 1 tablet (25 mg total) by mouth once daily.    SODIUM BICARBONATE 650 MG TABLET    Take 1 tablet (650 mg total) by mouth 2 (two) times daily.    TEMAZEPAM (RESTORIL) 30 MG CAPSULE    Take 1 capsule (30 mg total) by mouth nightly as needed for Insomnia.    TIZANIDINE (ZANAFLEX) 2 MG TABLET    Take 2 tablets (4 mg total) by mouth 2 (two) times a day.    VITAMIN E 400 UNIT CAPSULE    Take 400 Units by mouth once daily.     Objective:     Vitals:    05/22/25 1006   BP: 125/71   Pulse: 104   Temp: 97.1 °F (36.2 °C)     Lab Results   Component Value Date    WBC 3.77 (L) 05/20/2025    HGB 10.3 (L) 05/20/2025    HCT 32.0 (L) 05/20/2025    MCV 90 05/20/2025     05/20/2025       BMP  Lab Results   Component Value Date     05/18/2025    K 4.6 05/18/2025     05/18/2025    CO2 24 05/18/2025    BUN 36 (H) 05/18/2025    CREATININE 2.8 (H) 05/18/2025    CALCIUM 8.8 05/18/2025    ANIONGAP 9 05/18/2025    ESTGFRAFRICA 19 (A) 07/14/2022    EGFRNONAA 17 (A) 07/14/2022     Lab Results   Component Value Date    ALT 28 05/18/2025    AST 39 05/18/2025    ALKPHOS 165 (H) 05/18/2025    BILITOT 0.4 05/18/2025     Lab Results   Component Value Date    IRON 61 05/20/2025    TIBC 346 05/20/2025    FERRITIN 200.0 05/20/2025       Physical Exam  Vitals reviewed.   Constitutional:       Appearance: She is well-developed.   HENT:      Head: Normocephalic.      Right Ear: External ear normal.      Left Ear: External ear normal.   Eyes:      General: Lids are normal. No scleral icterus.        Right eye: No discharge.         Left eye: No discharge.       Conjunctiva/sclera: Conjunctivae normal.   Neck:      Thyroid: No thyroid mass.   Cardiovascular:      Rate and Rhythm: Normal rate and regular rhythm.      Heart sounds: Normal heart sounds.   Pulmonary:      Effort: Pulmonary effort is normal. No respiratory distress.   Abdominal:      General: Bowel sounds are normal. There is no distension.      Palpations: Abdomen is soft.   Genitourinary:     Comments: deferred  Musculoskeletal:         General: Normal range of motion.      Cervical back: Normal range of motion.      Comments: Mild BLE edema   Skin:     General: Skin is warm and dry.   Neurological:      Mental Status: She is alert and oriented to person, place, and time.   Psychiatric:         Speech: Speech normal.         Behavior: Behavior normal. Behavior is cooperative.         Thought Content: Thought content normal.        Assessment:     Problem List Items Addressed This Visit          Oncology    Anemia associated with stage 5 chronic renal failure - Primary (Chronic)    -H/H stable  -Hg 10.3. Most recent iron levels adequate    Hold Retacrit. F/u 4 weeks with CBC iron ferritin for consideration of Retacrit if hg <10         Relevant Orders    CBC Auto Differential    Iron and TIBC    Ferritin    Ductal carcinoma in situ (DCIS) of left breast    Continue f/u with breast cancer survivorship         Relevant Orders    CBC Auto Differential    Iron and TIBC    Ferritin         Plan:     Anemia associated with stage 5 chronic renal failure  -     CBC Auto Differential; Future; Expected date: 05/22/2025  -     Iron and TIBC; Future; Expected date: 05/22/2025  -     Ferritin; Future; Expected date: 05/22/2025    Ductal carcinoma in situ (DCIS) of left breast  -     CBC Auto Differential; Future; Expected date: 05/22/2025  -     Iron and TIBC; Future; Expected date: 05/22/2025  -     Ferritin; Future; Expected date: 05/22/2025      Route Chart for Scheduling    Med Onc Chart Routing      Follow up with  physician    Follow up with LIA 4 weeks.   Infusion scheduling note    Injection scheduling note retacrit   Labs CBC, ferritin and iron and TIBC   Scheduling:  Preferred lab:  Lab interval:     Imaging None      Pharmacy appointment No pharmacy appointment needed      Other referrals No nutrition appointment needed -        No additional referrals needed       Therapy Plan Information  INF EPOETIN TRISTAN (RETACRIT) INITIAL DOSES for Other osteoporosis without current pathological fracture, noted on 8/30/2019  INF EPOETIN TRISTAN (RETACRIT) INITIAL DOSES for Anemia associated with stage 5 chronic renal failure, noted on 11/19/2024  epoetin tristan-epbx injection 20,000 Units  20,000 Units, Subcutaneous, PRN      No therapy plan of the specified type found.    No therapy plan of the specified type found.          JOSE J العراقيC

## 2025-05-23 ENCOUNTER — TELEPHONE (OUTPATIENT)
Dept: PRIMARY CARE CLINIC | Facility: CLINIC | Age: 70
End: 2025-05-23
Payer: MEDICARE

## 2025-05-23 ENCOUNTER — TELEPHONE (OUTPATIENT)
Dept: CARDIOLOGY | Facility: CLINIC | Age: 70
End: 2025-05-23
Payer: MEDICARE

## 2025-05-23 RX ORDER — SODIUM BICARBONATE 650 MG/1
650 TABLET ORAL 2 TIMES DAILY
Qty: 60 TABLET | Refills: 1 | Status: SHIPPED | OUTPATIENT
Start: 2025-05-23

## 2025-05-23 NOTE — TELEPHONE ENCOUNTER
Spoke with pt regarding leg swelling and weight gain. Pt not experiencing any symptoms but PT is at her home to do exercises. Informed pt to call cardiology for recommendations per  d/t recent medication change by cardiology. Pt verbalized understanding

## 2025-05-23 NOTE — TELEPHONE ENCOUNTER
Contacted pt to let her know     That should not be related to the change in medications, if she is having worsening breathing she needs to go to the hospital , for now she can double up on her lasix for the next 2 days     She stated understanding

## 2025-05-26 ENCOUNTER — TELEPHONE (OUTPATIENT)
Dept: PAIN MEDICINE | Facility: CLINIC | Age: 70
End: 2025-05-26
Payer: MEDICARE

## 2025-05-26 ENCOUNTER — DOCUMENT SCAN (OUTPATIENT)
Dept: HOME HEALTH SERVICES | Facility: HOSPITAL | Age: 70
End: 2025-05-26
Payer: MEDICARE

## 2025-05-26 NOTE — TELEPHONE ENCOUNTER
Spoke with patient she is coming in for eval for back/rib pain due to CPR.  RFA not done due to health issues.

## 2025-05-27 ENCOUNTER — OFFICE VISIT (OUTPATIENT)
Dept: NEPHROLOGY | Facility: CLINIC | Age: 70
End: 2025-05-27
Payer: MEDICARE

## 2025-05-27 VITALS
WEIGHT: 137.81 LBS | BODY MASS INDEX: 25.36 KG/M2 | DIASTOLIC BLOOD PRESSURE: 60 MMHG | HEIGHT: 62 IN | SYSTOLIC BLOOD PRESSURE: 130 MMHG | HEART RATE: 110 BPM

## 2025-05-27 DIAGNOSIS — N18.4 STAGE 4 CHRONIC KIDNEY DISEASE: Primary | ICD-10-CM

## 2025-05-27 DIAGNOSIS — R80.1 PERSISTENT PROTEINURIA: ICD-10-CM

## 2025-05-27 DIAGNOSIS — N17.9 AKI (ACUTE KIDNEY INJURY): ICD-10-CM

## 2025-05-27 DIAGNOSIS — N25.81 SECONDARY HYPERPARATHYROIDISM OF RENAL ORIGIN: ICD-10-CM

## 2025-05-27 DIAGNOSIS — I10 PRIMARY HYPERTENSION: ICD-10-CM

## 2025-05-27 PROCEDURE — 3075F SYST BP GE 130 - 139MM HG: CPT | Mod: CPTII,HCNC,S$GLB, | Performed by: INTERNAL MEDICINE

## 2025-05-27 PROCEDURE — 99999 PR PBB SHADOW E&M-EST. PATIENT-LVL III: CPT | Mod: PBBFAC,HCNC,, | Performed by: INTERNAL MEDICINE

## 2025-05-27 PROCEDURE — 1157F ADVNC CARE PLAN IN RCRD: CPT | Mod: CPTII,HCNC,S$GLB, | Performed by: INTERNAL MEDICINE

## 2025-05-27 PROCEDURE — 3078F DIAST BP <80 MM HG: CPT | Mod: CPTII,HCNC,S$GLB, | Performed by: INTERNAL MEDICINE

## 2025-05-27 PROCEDURE — 1159F MED LIST DOCD IN RCRD: CPT | Mod: CPTII,HCNC,S$GLB, | Performed by: INTERNAL MEDICINE

## 2025-05-27 PROCEDURE — 1125F AMNT PAIN NOTED PAIN PRSNT: CPT | Mod: CPTII,HCNC,S$GLB, | Performed by: INTERNAL MEDICINE

## 2025-05-27 PROCEDURE — 99215 OFFICE O/P EST HI 40 MIN: CPT | Mod: HCNC,S$GLB,, | Performed by: INTERNAL MEDICINE

## 2025-05-27 PROCEDURE — 1111F DSCHRG MED/CURRENT MED MERGE: CPT | Mod: CPTII,HCNC,S$GLB, | Performed by: INTERNAL MEDICINE

## 2025-05-27 PROCEDURE — 1160F RVW MEDS BY RX/DR IN RCRD: CPT | Mod: CPTII,HCNC,S$GLB, | Performed by: INTERNAL MEDICINE

## 2025-05-27 PROCEDURE — 3288F FALL RISK ASSESSMENT DOCD: CPT | Mod: CPTII,HCNC,S$GLB, | Performed by: INTERNAL MEDICINE

## 2025-05-27 PROCEDURE — 3008F BODY MASS INDEX DOCD: CPT | Mod: CPTII,HCNC,S$GLB, | Performed by: INTERNAL MEDICINE

## 2025-05-27 PROCEDURE — 3066F NEPHROPATHY DOC TX: CPT | Mod: CPTII,HCNC,S$GLB, | Performed by: INTERNAL MEDICINE

## 2025-05-27 PROCEDURE — 1101F PT FALLS ASSESS-DOCD LE1/YR: CPT | Mod: CPTII,HCNC,S$GLB, | Performed by: INTERNAL MEDICINE

## 2025-05-27 NOTE — ASSESSMENT & PLAN NOTE
"  No results for input(s): "HGB", "HCT" in the last 72 hours.    No obvious signs of bleeding on exam. Downtrend from previous lab draws, could be contributing to current symptoms  --serial CBC to trend h&h  --iron studies, B12, folic acid, FOBT ordered for completion  --low threshold to transfuse for hgb < 7    "

## 2025-05-27 NOTE — ASSESSMENT & PLAN NOTE
Stable, enzymes flattened.   05/27/2025  -Stat troponin during episode of CP lower than previous lab draw  -CP improved to 5/10 severity after nitro SL x 2 doses  -Transfer to [facility] [place] initiated  -Transplant team to see as consult

## 2025-05-27 NOTE — ASSESSMENT & PLAN NOTE
Stable, enzymes flattened.   05/27/2025  -Stat troponin during episode of CP lower than previous lab draw  -CP improved to 5/10 severity after nitro SL x 2 doses  -Transfer to [facility] [place] initiated  -Transplant team to see as consult

## 2025-05-27 NOTE — ASSESSMENT & PLAN NOTE
Stable, enzymes flattened.   05/27/2025  -Stat troponin during episode of CP lower than previous lab draw  -CP improved to 5/10 severity after nitro SL x 2 doses  -Transfer to [facility] [place] initiated  -Transplant team to see as consult

## 2025-05-27 NOTE — ASSESSMENT & PLAN NOTE
Stable, enzymes flattened.   05/27/2025  -Stat troponin during episode of CP lower than previous lab draw  -CP improved to 5/10 severity after nitro SL x 2 doses  -Transfer to [facility] [place] initiated  -Transplant team to see as consult

## 2025-05-27 NOTE — PROGRESS NOTES
[facility] Medicine  Progress Note    Patient Name: Nadia Damon  MRN: [id number]  Patient Class: IP- Inpatient   Admission Date: 5/12/2025  Length of Stay: 5 days  Attending Physician: No att. providers found  Primary Care Provider: Elis Wick MD        Subjective     Principal Problem:Syncope        HPI:  Nadia Damon is a 69 y.o. female with a PMH  has a past medical history of Abdominal wall hernia, Abnormal mammogram (10/12/2021), Acute cystitis without hematuria (05/10/2022), Acute ischemic right middle cerebral artery (MCA) stroke (07/20/2024), Adverse drug reaction (11/12/2024), GRACE (acute kidney injury) (11/22/2021), Anxiety, Aphasia (07/19/2024), Arthritis, Breast cancer in female (08/2021), Breast mass, right (11/13/2024), Bronchitis (08/18/2016), C. difficile colitis (11/29/2021), Cellulitis of axilla, left (12/23/2021), Chronic diastolic heart failure (12/16/2021), Chronic kidney disease, Chronic midline low back pain without sciatica (06/18/2018), CKD (chronic kidney disease) stage 4, GFR 15-29 ml/min (04/20/2016), Closed nondisplaced fracture of distal phalanx of left great toe with routine healing (10/22/2018), Coronary artery disease (1993), COVID-19 in immunocompromised patient (02/26/2024), Cystitis (05/10/2022), Depression, Encounter for blood transfusion, Encounter for palliative care (10/14/2024), Fibromyalgia, Heart failure, Heart transplanted (1993), History of hyperparathyroidism; Hyperparathyroidism, secondary renal, Hypertension, Immune disorder, 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), Impaired mobility (07/28/2022), Iron deficiency anemia (08/15/2017), Kidney stones, Obesity, Obesity (BMI 30.0-34.9) (07/22/2019), Other osteoporosis without current pathological fracture (08/30/2019), Other pancytopenia (05/06/2024), Parotitis, acute (09/19/2023), Pharyngitis (01/09/2019), Pneumonia due to infectious organism (03/14/2024),  PONV (postoperative nausea and vomiting), Severe sepsis (11/22/2021), Shingles (2003 approx), Stage 4 chronic kidney disease (11/22/2021), Subclinical hypothyroidism (06/16/2023), Thrombocytopenia, unspecified (11/29/2021), Trouble in sleeping, Unresponsiveness (11/13/2024), and Urinary incontinence.presented to the Emergency Department for evaluation of syncopal event and low bp while at f/u appointment at PCP (Dr. Wick) office. Pt c/o chest pain and soreness which began after undergoing chest compressions on 4/24 due to cardiac arrest. Pt states that she is unsure how long chest compressions lasted. She states that she was sent from [facility] to have back surgery but the procedure was postponed due to concerns of chest pain. After 3 EKGs, the surgeon cancelled the procedure on 4/24.  Pt reports while awaiting for a Lyft, she felt dizzy and light-headed. Pt states that the  of the clinic ran over to catch her and prevented her from hitting her head. Dr. Wick re-evaluated the pt and advised pt to come into the ER. PT confirms that she had a heart transplant years ago. She states that her transplant doctor has been denying her of any further surgeries due to Stage 5 Kidney Failure. She confirms that she is aware of dialysis being her next step. Symptoms are constant and moderate in severity. No mitigating or exacerbating factors reported. No associated sxs included. Patient denies any fever, chills cough, rhinorrhea, blood in stool, melena, or diarrhea. No prior Tx specified. No further complaints or concerns at this time. Dr. Tubbs with Cardiac Transplant team is ok with keeping patient in BR and can obtain cyclosporine level in am with gentle iv hydration.    ER workup revealed CBC to be at baseline with H/H of 9.5/29.2.  CMP revealed BUN/creatinine 45/3.9 with EGFR of 12 (previously 40/2.9 with EGFR 17 on 05/07/2025).  BNP 4 6.  Initial troponin 0.139 with repeat troponin 0.095.  UA with +3 leukocyte  "esterase, 17 WBCs, rare bacteria.  EKG revealed normal sinus rhythm with right bundle-branch block with a ventricular rate of 75 beats per minute and a QT/QTC of 452/504.  Orthostatics negative.  Vital signs stable.  Hospital Medicine consulted to admit patient for gentle IV hydration for GRACE superimposed on CKD and syncope.  Patient in agreement with treatment plan.  Patient admitted under inpatient status.      PCP: Elis Wick      Overview/Hospital Course:  The patient was first evaluated on 5/14/25 while eating lunch and reported reduced fatigue with mild residual chest tenderness; laboratory results showed Cr 3.1 with post-transfusion H/H improvement, and orthostatic vitals were pending. On 5/15/25 she returned from PT/OT and experienced abrupt 10/10 substernal constriction after straining for a BM--symptoms reminiscent of her February arrest and possibly aggravated by CPR-related rib fractures--yet pain eased with two doses of NTG while VS remained stable. Repeat labs revealed rising Cr 3.6 and H/H 9.0/27.7, and a STAT troponin was sent but was lower than previous lab draw. Cardiology was consulted, advised transfer to the transplant team at the [facility]. Discussed with on call Heart Transplant provider who agreed with the transfer with hospital medicine to accept. Transfer Center was notified and transfer process was initiated.     Interval history:  See hospital course about episode leading to transfer to [facility].      Objective:   /71 (BP Location: Right arm, Patient Position: Lying)   Pulse 94   Temp 97.7 °F (36.5 °C) (Oral)   Resp 18   Ht 5' 2" (1.575 m)   Wt 62.1 kg (136 lb 14.5 oz)   LMP 06/20/1983 (Within Years)   SpO2 99%   BMI 25.04 kg/m²   No intake or output data in the 24 hours ending 05/27/25 1143      PHYSICAL EXAM  Vitals reviewed  Constitutional:       General: She is awake. She is not in acute distress.     Appearance: Normal appearance. She is " "well-developed and well-groomed. She is not ill-appearing, toxic-appearing or diaphoretic.   Cardiovascular:      Rate and Rhythm: Normal rate and regular rhythm.      Heart sounds: Normal heart sounds. No murmur heard.  Pulmonary:      Effort: Pulmonary effort is normal.      Breath sounds: Normal breath sounds. No decreased breath sounds, wheezing, rhonchi or rales.   Abdominal:      General: Bowel sounds are normal.      Palpations: Abdomen is soft.      Tenderness: There is no abdominal tenderness. There is no right CVA tenderness, left CVA tenderness, guarding or rebound.   Musculoskeletal:      Cervical back: Normal range of motion and neck supple.      Right lower leg: No edema.      Left lower leg: No edema.      Comments: 5/5 strength throughout   Skin:     General: Skin is warm and dry.      Capillary Refill: Capillary refill takes less than 2 seconds.   Neurological:      General: No focal deficit present.      Mental Status: She is alert and oriented to person, place, and time. Mental status is at baseline.      GCS: GCS eye subscore is 4. GCS verbal subscore is 5. GCS motor subscore is 6.      Cranial Nerves: Cranial nerves 2-12 are intact.      Sensory: Sensation is intact.      Motor: Motor function is intact.   Psychiatric:         Mood and Affect: Mood normal.         Speech: Speech normal.         Behavior: Behavior normal. Behavior is cooperative.     LABS  All labs from the past 24 hours were reviewed.     BMP:   No results for input(s): "GLU", "NA", "K", "CL", "CO2", "BUN", "CREATININE", "CALCIUM", "MG" in the last 48 hours.    CBC:   No results for input(s): "WBC", "HGB", "HCT", "PLT" in the last 48 hours.    CMP:   No results for input(s): "NA", "K", "CL", "CO2", "GLU", "BUN", "CREATININE", "CALCIUM", "PROT", "ALBUMIN", "BILITOT", "ALKPHOS", "AST", "ALT", "ANIONGAP", "EGFRNONAA" in the last 48 hours.    Invalid input(s): "ESTGFAFRICA"    Cardiac Markers:   No results for input(s): "CKMB", " ""MYOGLOBIN", "BNP", "TROPISTAT" in the last 48 hours.    Coagulation: No results for input(s): "PT", "INR", "APTT" in the last 48 hours.  Lactic Acid: No results for input(s): "LACTATE" in the last 48 hours.  Magnesium:   No results for input(s): "MG" in the last 48 hours.    Troponin:   No results for input(s): "TROPONINI", "TROPONINIHS" in the last 48 hours.    TSH:   Recent Labs   Lab 04/25/25  0720   TSH 12.101*     Urine Studies:   No results for input(s): "COLORU", "APPEARANCEUA", "PHUR", "SPECGRAV", "PROTEINUA", "GLUCUA", "KETONESU", "BILIRUBINUA", "OCCULTUA", "NITRITE", "UROBILINOGEN", "LEUKOCYTESUR", "RBCUA", "WBCUA", "BACTERIA", "SQUAMEPITHEL", "HYALINECASTS" in the last 48 hours.    Invalid input(s): "WRIGHTSUR"      IMAGING  All imaging from the past 24 hours were reviewed.     Imaging Results    None             Assessment & Plan  Elevated troponin  Stable, enzymes flattened.   05/27/2025  -Stat troponin during episode of CP lower than previous lab draw  -CP improved to 5/10 severity after nitro SL x 2 doses  -Transfer to [facility] initiated  -Transplant team to see as consult    Chest pain  Stable, enzymes flattened.   05/27/2025  -Stat troponin during episode of CP lower than previous lab draw  -CP improved to 5/10 severity after nitro SL x 2 doses  -Transfer to [facility] initiated  -Transplant team to see as consult    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  Stable, enzymes flattened.   05/27/2025  -Stat troponin during episode of CP lower than previous lab draw  -CP improved to 5/10 severity after nitro SL x 2 doses  -Transfer to [facility] initiated  -Transplant team to see as consult    Essential hypertension  controlled    Major depressive disorder, single episode, moderate  controlled    Gastroesophageal reflux disease without esophagitis  controlled      Fibromyalgia  Controlled  -continue Lyrica    Normocytic anemia    No results for input(s): "HGB", " ""HCT" in the last 72 hours.    No obvious signs of bleeding on exam. Downtrend from previous lab draws, could be contributing to current symptoms  --serial CBC to trend h&h  --iron studies, B12, folic acid, FOBT ordered for completion  --low threshold to transfuse for hgb < 7    Severe protein-calorie malnutrition      VTE Risk Mitigation (From admission, onward)           Ordered     IP VTE HIGH RISK PATIENT  Once         05/12/25 2146                    Discharge Planning   RATNA: 5/16/2025     Code Status: DNR   Medical Readiness for Discharge Date: 5/16/2025  Discharge Plan A: Assisted Living   Discharge Delays: None known at this time                    Ra Rowe MD  Department of Hospital Medicine   [facility] ([facility])    "

## 2025-05-27 NOTE — DISCHARGE SUMMARY
[facility] Medicine  Discharge Summary      Patient Name: Nadia Damon  MRN: [id number]  CHRISTIANO: [id number]  Patient Class: IP- Inpatient  Admission Date: 5/12/2025  Hospital Length of Stay: 5 days  Discharge Date and Time: 5/17/2025 12:29 AM  Attending Physician: No att. providers found   Discharging Provider: Ra Rowe MD  Primary Care Provider: Elis Wick MD    Primary Care Team: Networked reference to record PCT     HPI:   Nadia Damon is a 69 y.o. female with a PMH  has a past medical history of Abdominal wall hernia, Abnormal mammogram (10/12/2021), Acute cystitis without hematuria (05/10/2022), Acute ischemic right middle cerebral artery (MCA) stroke (07/20/2024), Adverse drug reaction (11/12/2024), GRACE (acute kidney injury) (11/22/2021), Anxiety, Aphasia (07/19/2024), Arthritis, Breast cancer in female (08/2021), Breast mass, right (11/13/2024), Bronchitis (08/18/2016), C. difficile colitis (11/29/2021), Cellulitis of axilla, left (12/23/2021), Chronic diastolic heart failure (12/16/2021), Chronic kidney disease, Chronic midline low back pain without sciatica (06/18/2018), CKD (chronic kidney disease) stage 4, GFR 15-29 ml/min (04/20/2016), Closed nondisplaced fracture of distal phalanx of left great toe with routine healing (10/22/2018), Coronary artery disease (1993), COVID-19 in immunocompromised patient (02/26/2024), Cystitis (05/10/2022), Depression, Encounter for blood transfusion, Encounter for palliative care (10/14/2024), Fibromyalgia, Heart failure, Heart transplanted (1993), History of hyperparathyroidism; Hyperparathyroidism, secondary renal, Hypertension, Immune disorder, 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), Impaired mobility (07/28/2022), Iron deficiency anemia (08/15/2017), Kidney stones, Obesity, Obesity (BMI 30.0-34.9) (07/22/2019), Other osteoporosis without current pathological fracture (08/30/2019), Other  pancytopenia (05/06/2024), Parotitis, acute (09/19/2023), Pharyngitis (01/09/2019), Pneumonia due to infectious organism (03/14/2024), PONV (postoperative nausea and vomiting), Severe sepsis (11/22/2021), Shingles (2003 approx), Stage 4 chronic kidney disease (11/22/2021), Subclinical hypothyroidism (06/16/2023), Thrombocytopenia, unspecified (11/29/2021), Trouble in sleeping, Unresponsiveness (11/13/2024), and Urinary incontinence.presented to the Emergency Department for evaluation of syncopal event and low bp while at f/u appointment at PCP (Dr. Wick) office. Pt c/o chest pain and soreness which began after undergoing chest compressions on 4/24 due to cardiac arrest. Pt states that she is unsure how long chest compressions lasted. She states that she was sent from [facility] to have back surgery but the procedure was postponed due to concerns of chest pain. After 3 EKGs, the surgeon cancelled the procedure on 4/24.  Pt reports while awaiting for a Lyft, she felt dizzy and light-headed. Pt states that the  of the clinic ran over to catch her and prevented her from hitting her head. Dr. Wick re-evaluated the pt and advised pt to come into the ER. PT confirms that she had a heart transplant years ago. She states that her transplant doctor has been denying her of any further surgeries due to Stage 5 Kidney Failure. She confirms that she is aware of dialysis being her next step. Symptoms are constant and moderate in severity. No mitigating or exacerbating factors reported. No associated sxs included. Patient denies any fever, chills cough, rhinorrhea, blood in stool, melena, or diarrhea. No prior Tx specified. No further complaints or concerns at this time. Dr. Tubbs with Cardiac Transplant team is ok with keeping patient in BR and can obtain cyclosporine level in am with gentle iv hydration.    ER workup revealed CBC to be at baseline with H/H of 9.5/29.2.  CMP revealed BUN/creatinine 45/3.9 with EGFR of  12 (previously 40/2.9 with EGFR 17 on 05/07/2025).  BNP 4 6.  Initial troponin 0.139 with repeat troponin 0.095.  UA with +3 leukocyte esterase, 17 WBCs, rare bacteria.  EKG revealed normal sinus rhythm with right bundle-branch block with a ventricular rate of 75 beats per minute and a QT/QTC of 452/504.  Orthostatics negative.  Vital signs stable.  Hospital Medicine consulted to admit patient for gentle IV hydration for GRACE superimposed on CKD and syncope.  Patient in agreement with treatment plan.  Patient admitted under inpatient status.      PCP: Elis Wick      * No surgery found *      Hospital Course:   The patient was first evaluated on 5/14/25 while eating lunch and reported reduced fatigue with mild residual chest tenderness; laboratory results showed Cr 3.1 with post-transfusion H/H improvement, and orthostatic vitals were pending. On 5/15/25 she returned from PT/OT and experienced abrupt 10/10 substernal constriction after straining for a BM--symptoms reminiscent of her February arrest and possibly aggravated by CPR-related rib fractures--yet pain eased with two doses of NTG while VS remained stable. Repeat labs revealed rising Cr 3.6 and H/H 9.0/27.7, and a STAT troponin was sent but was lower than previous lab draw. Cardiology was consulted, advised transfer to the transplant team at the [facility]. Discussed with on call Heart Transplant provider who agreed with the transfer with hospital medicine to accept. Transfer CThe patient was first evaluated on 5/14/25 while eating lunch and reported reduced fatigue with mild residual chest tenderness; laboratory results showed Cr 3.1 with post-transfusion H/H improvement, and orthostatic vitals were pending. On 5/15/25 she returned from PT/OT and experienced abrupt 10/10 substernal constriction after straining for a BM--symptoms reminiscent of her February arrest and possibly aggravated by CPR-related rib fractures--yet pain eased with two  "doses of NTG while VS remained stable. Repeat labs revealed rising Cr 3.6 and H/H 9.0/27.7, and a STAT troponin was sent but was lower than previous lab draw. Cardiology was consulted, advised transfer to the transplant team at the [facility]. Discussed with on call Heart Transplant provider who agreed with the transfer with hospital medicine to accept. Transfer Center was notified and transfer process was initiated. enter was notified and transfer process was initiated.     /71 (BP Location: Right arm, Patient Position: Lying)   Pulse 94   Temp 97.7 °F (36.5 °C) (Oral)   Resp 18   Ht 5' 2" (1.575 m)   Wt 62.1 kg (136 lb 14.5 oz)   LMP 06/20/1983 (Within Years)   SpO2 99%   BMI 25.04 kg/m²        Goals of Care Treatment Preferences:  Code Status: DNR    Health care agent: Moy Watkins (son)  Health care agent number: [phone]    Living Will: Yes  POLST: Yes  What is most important right now is to focus on remaining as independent as possible, symptom/pain control, improvement in condition but with limits to invasive therapies.  Accordingly, we have decided that the best plan to meet the patient's goals includes continuing with treatment.      SDOH Screening:  The patient was screened for utility difficulties, food insecurity, transport difficulties, housing insecurity, and interpersonal safety and there were no concerns identified this admission.     Consults:   Consults (From admission, onward)          Status Ordering Provider     Inpatient consult to Cardiology  Once        Provider:  Glenn Silva MD    Completed NAHID MONTENEGRO            Assessment & Plan  Elevated troponin  Stable, enzymes flattened.   05/27/2025  -Stat troponin during episode of CP lower than previous lab draw  -CP improved to 5/10 severity after nitro SL x 2 doses  -Transfer to [facility] initiated  -Transplant team to see as consult    Chest pain  Stable, enzymes flattened.   05/27/2025  -Stat troponin during " "episode of CP lower than previous lab draw  -CP improved to 5/10 severity after nitro SL x 2 doses  -Transfer to [facility] initiated  -Transplant team to see as consult    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  Stable, enzymes flattened.   05/27/2025  -Stat troponin during episode of CP lower than previous lab draw  -CP improved to 5/10 severity after nitro SL x 2 doses  -Transfer to [facility] initiated  -Transplant team to see as consult    Essential hypertension  controlled    Major depressive disorder, single episode, moderate  controlled    Gastroesophageal reflux disease without esophagitis  controlled      Fibromyalgia  Controlled  -continue Lyrica    Normocytic anemia    No results for input(s): "HGB", "HCT" in the last 72 hours.    No obvious signs of bleeding on exam. Downtrend from previous lab draws, could be contributing to current symptoms  --serial CBC to trend h&h  --iron studies, B12, folic acid, FOBT ordered for completion  --low threshold to transfuse for hgb < 7    Severe protein-calorie malnutrition    Final Active Diagnoses:    Diagnosis Date Noted POA    Normocytic anemia [D64.9] 05/13/2025 Yes     Chronic    Severe protein-calorie malnutrition [E43] 05/16/2025 Yes    Elevated troponin [R79.89] 05/13/2025 Unknown    Major depressive disorder, single episode, moderate [F32.1] 05/12/2025 Yes    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 [I25.758] 12/04/2024 Yes     Chronic    Chest pain [R07.9] 04/05/2022 Yes     Chronic    Essential hypertension [I10] 04/13/2017 Yes     Chronic    Gastroesophageal reflux disease without esophagitis [K21.9] 04/20/2016 Yes     Chronic    Fibromyalgia [M79.7] 04/20/2016 Yes      Problems Resolved During this Admission:    Diagnosis Date Noted Date Resolved POA    PRINCIPAL PROBLEM:  Syncope [R55] 05/13/2025 05/21/2025 Unknown    Acute kidney injury superimposed on stage 5 chronic kidney disease, " not on chronic dialysis [N17.9, N18.5] 05/13/2025 05/13/2025 Unknown    Acute kidney injury superimposed on stage 4 chronic kidney disease [N17.9, N18.4] 05/13/2025 05/21/2025 Unknown       Discharged Condition: good    Disposition: Planned Readmission - Di*    Follow Up:   Follow-up Information       Courtney Tubbs MD. Schedule an appointment as soon as possible for a visit.    Specialties: Cardiology, Transplant  Why: Someone will reach out to patient and schedule routine hospital follow up  Contact information:  [address]  [facility] [address]  [phone]               Elis Wick MD Follow up.    Specialty: Internal Medicine  Why: Routine follow up following hospitalization  Contact information:  [address]  [facility] [address] [phone]                           Patient Instructions:   No discharge procedures on file.    Significant Diagnostic Studies: Labs: All labs within the past 24 hours have been reviewed    Pending Diagnostic Studies:       None           Medications:  Reconciled Home Medications:      Medication List        PAUSE taking these medications      oxyCODONE 5 MG immediate release tablet  Wait to take this until your doctor or other care provider tells you to start again.  Commonly known as: ROXICODONE  Take 1 tablet (5 mg total) by mouth every 4 (four) hours as needed for Pain.  Ask about: Should I take this medication?            CONTINUE taking these medications      aspirin 81 MG EC tablet  Commonly known as: ECOTRIN  Take 1 tablet (81 mg total) by mouth once daily.     calcitRIOL 0.25 MCG Cap  Commonly known as: ROCALTROL  Take 1 capsule (0.25 mcg total) by mouth once daily.     cycloSPORINE 100 mg/mL microemulsion solution  Commonly known as: NEORAL  Take 0.5 mLs (50 mg total) by mouth every 12 (twelve) hours.     nitroGLYCERIN 0.4 MG SL tablet  Commonly known as: NITROSTAT  PLACE 1 TABLET UNDER THE TONGUE EVERY 5 MINS AS NEEDED FOR CHEST PAIN FOR UP TO  3 DOSES.IF CONTINUED HEART PAIN/PRESSURE AFTER     pantoprazole 40 MG tablet  Commonly known as: PROTONIX  Take 1 tablet (40 mg total) by mouth once daily.     polyethylene glycol 17 gram Pwpk  Commonly known as: GLYCOLAX  Take 17 g by mouth once daily.     pregabalin 50 MG capsule  Commonly known as: LYRICA  Take 1 capsule (50 mg total) by mouth every evening.     sertraline 25 MG tablet  Commonly known as: ZOLOFT  Take 1 tablet (25 mg total) by mouth once daily.     tiZANidine 2 MG tablet  Commonly known as: ZANAFLEX  Take 2 tablets (4 mg total) by mouth 2 (two) times a day.     vitamin E 400 UNIT capsule  Take 400 Units by mouth once daily.              Indwelling Lines/Drains at time of discharge:   Lines/Drains/Airways       None                   Time spent on the discharge of patient: 31 minutes  of time spent on discharge including examining patient, providing discharge instructions, arranging follow-up and documentation.           Ra Rowe MD  Department of Hospital Medicine  [facility] ([facility])

## 2025-05-27 NOTE — PROGRESS NOTES
"Subjective:       Patient ID: Nadia Damon is a 69 y.o. female.    Chief Complaint:  CKD, hypertension    HPI    Since her last office visit she was hospitalized in May with chest pain.  While she was in the hospital she suffered a cardiac arrest resulting in respiratory failure and acute kidney injury.  Fortunately with medical management she improved.  Since being discharged she continues to have some chest wall discomfort due to having had chest compressions done.  Her creatinine peaked at around 4.3.  It has subsequently returned to better than her usual baseline of 2.8.  Her laboratory studies medications were reviewed.  All Nephrology related questions were answered to her satisfaction.    Review of Systems   Constitutional: Negative.    HENT: Negative.     Respiratory: Negative.     Cardiovascular: Negative.    Gastrointestinal: Negative.    Genitourinary: Negative.    Musculoskeletal: Negative.    Skin: Negative.        /60 (BP Location: Right arm, Patient Position: Sitting)   Pulse 110   Ht 5' 2" (1.575 m)   Wt 62.5 kg (137 lb 12.6 oz)   LMP 06/20/1983 (Within Years)   BMI 25.20 kg/m²     Lab Results   Component Value Date    WBC 3.77 (L) 05/20/2025    HGB 10.3 (L) 05/20/2025    HCT 32.0 (L) 05/20/2025    MCV 90 05/20/2025     05/20/2025      BMP  Lab Results   Component Value Date     05/18/2025    K 4.6 05/18/2025     05/18/2025    CO2 24 05/18/2025    BUN 36 (H) 05/18/2025    CREATININE 2.8 (H) 05/18/2025    CALCIUM 8.8 05/18/2025    ANIONGAP 9 05/18/2025    ESTGFRAFRICA 19 (A) 07/14/2022    EGFRNONAA 17 (A) 07/14/2022     CMP  Sodium   Date Value Ref Range Status   05/18/2025 140 136 - 145 mmol/L Final   02/24/2025 142 136 - 145 mmol/L Final   02/24/2025 142 136 - 145 mmol/L Final     Potassium   Date Value Ref Range Status   05/18/2025 4.6 3.5 - 5.1 mmol/L Final   02/24/2025 4.7 3.5 - 5.1 mmol/L Final   02/24/2025 4.7 3.5 - 5.1 mmol/L Final     Chloride   Date Value " Ref Range Status   05/18/2025 107 95 - 110 mmol/L Final   02/24/2025 105 95 - 110 mmol/L Final   02/24/2025 105 95 - 110 mmol/L Final     CO2   Date Value Ref Range Status   05/18/2025 24 23 - 29 mmol/L Final   02/24/2025 21 (L) 23 - 29 mmol/L Final   02/24/2025 21 (L) 23 - 29 mmol/L Final     Glucose   Date Value Ref Range Status   05/18/2025 77 70 - 110 mg/dL Final   02/24/2025 76 70 - 110 mg/dL Final   02/24/2025 76 70 - 110 mg/dL Final     BUN   Date Value Ref Range Status   05/18/2025 36 (H) 8 - 23 mg/dL Final     Creatinine   Date Value Ref Range Status   05/18/2025 2.8 (H) 0.5 - 1.4 mg/dL Final     Calcium   Date Value Ref Range Status   05/18/2025 8.8 8.7 - 10.5 mg/dL Final   02/24/2025 9.7 8.7 - 10.5 mg/dL Final   02/24/2025 9.7 8.7 - 10.5 mg/dL Final     Protein Total   Date Value Ref Range Status   05/18/2025 7.2 6.0 - 8.4 gm/dL Final     Total Protein   Date Value Ref Range Status   02/24/2025 7.9 6.0 - 8.4 g/dL Final     Albumin   Date Value Ref Range Status   05/18/2025 2.8 (L) 3.5 - 5.2 g/dL Final   02/24/2025 3.3 (L) 3.5 - 5.2 g/dL Final   02/24/2025 3.3 (L) 3.5 - 5.2 g/dL Final     Total Bilirubin   Date Value Ref Range Status   02/24/2025 0.4 0.1 - 1.0 mg/dL Final     Comment:     For infants and newborns, interpretation of results should be based  on gestational age, weight and in agreement with clinical  observations.    Premature Infant recommended reference ranges:  Up to 24 hours.............<8.0 mg/dL  Up to 48 hours............<12.0 mg/dL  3-5 days..................<15.0 mg/dL  6-29 days.................<15.0 mg/dL       Bilirubin Total   Date Value Ref Range Status   05/18/2025 0.4 0.1 - 1.0 mg/dL Final     Comment:     For infants and newborns, interpretation of results should be based   on gestational age, weight and in agreement with clinical   observations.    Premature Infant recommended reference ranges:   0-24 hours:  <8.0 mg/dL   24-48 hours: <12.0 mg/dL   3-5 days:    <15.0 mg/dL    6-29 days:   <15.0 mg/dL     Alkaline Phosphatase   Date Value Ref Range Status   02/24/2025 120 40 - 150 U/L Final     ALP   Date Value Ref Range Status   05/18/2025 165 (H) 40 - 150 unit/L Final     AST   Date Value Ref Range Status   05/18/2025 39 11 - 45 unit/L Final   02/24/2025 42 (H) 10 - 40 U/L Final     ALT   Date Value Ref Range Status   05/18/2025 28 10 - 44 unit/L Final   02/24/2025 28 10 - 44 U/L Final     Anion Gap   Date Value Ref Range Status   05/18/2025 9 8 - 16 mmol/L Final     eGFR if    Date Value Ref Range Status   07/14/2022 19 (A) >60 mL/min/1.73 m^2 Final     eGFR if non    Date Value Ref Range Status   07/14/2022 17 (A) >60 mL/min/1.73 m^2 Final     Comment:     Calculation used to obtain the estimated glomerular filtration  rate (eGFR) is the CKD-EPI equation.        Medications Ordered Prior to Encounter[1]         Objective:            Physical Exam  Constitutional:       Appearance: Normal appearance.   HENT:      Head: Normocephalic and atraumatic.   Eyes:      General: No scleral icterus.     Extraocular Movements: Extraocular movements intact.      Pupils: Pupils are equal, round, and reactive to light.   Pulmonary:      Effort: Pulmonary effort is normal.      Breath sounds: No stridor.   Musculoskeletal:      Right lower leg: No edema.      Left lower leg: No edema.   Skin:     General: Skin is warm and dry.   Neurological:      General: No focal deficit present.      Mental Status: She is alert and oriented to person, place, and time.   Psychiatric:         Mood and Affect: Mood normal.         Behavior: Behavior normal.       Assessment:       1. Stage 4 chronic kidney disease    2. Primary hypertension    3. Persistent proteinuria    4. Secondary hyperparathyroidism of renal origin    5. GRACE (acute kidney injury)        Plan:       1. Creatinine has decreased to 2.8 on most recent laboratory studies.  This is the best her creatinine has been in  about a year.  Urinalysis is bland with persistent low-grade proteinuria.    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  She is a heart transplant from 1993.    2. Blood pressure is adequately controlled on current regimen.    3. She has persistent low-grade proteinuria.  Will check a PC ratio prior to her next office visit.  She has had low-grade proteinuria for greater than 10 years.    4. Will check an intact PTH and vitamin-D prior to her next office visit.  Calcium corrects to 9.76 for an albumin of 2.8.  Uncorrected calcium is 8.8.    5. Acute kidney injury while hospitalized secondary to cardiac arrest with ischemic ATN.  Fortunately her creatinine has continued to improve in his actually now better than her usual baseline.  A proximally year ago her creatinine was running between about 3.5 and 4.    A minimum of 40 minutes was spent in face-to-face conversation, chart review and review of hospital records.    Follow-up labs: Renal panel, PC ratio, intact PTH, vitamin-D.      Gunner Melgar MD         [1]   Current Outpatient Medications on File Prior to Visit   Medication Sig Dispense Refill    aspirin (ECOTRIN) 81 MG EC tablet Take 1 tablet (81 mg total) by mouth once daily.      calcitRIOL (ROCALTROL) 0.25 MCG Cap Take 1 capsule (0.25 mcg total) by mouth once daily. 30 capsule 11    carvediloL (COREG) 3.125 MG tablet TAKE 1 TABLET TWICE DAILY WITH MEALS 180 tablet 3    cycloSPORINE (NEORAL) 100 mg/mL microemulsion solution Take 0.5 mLs (50 mg total) by mouth every 12 (twelve) hours. 50 mL 12    furosemide (LASIX) 40 MG tablet Take ½ tablet (20 mg total) by mouth once daily. 30 tablet 11    isosorbide mononitrate (IMDUR) 60 MG 24 hr tablet Take 1 tablet (60 mg total) by mouth once daily. 30 tablet 11    NIFEdipine (PROCARDIA-XL) 60 MG (OSM) 24 hr tablet Take 1 tablet (60 mg total) by mouth 2 (two) times a day. 60 tablet 11    nitroGLYCERIN (NITROSTAT) 0.4 MG SL tablet PLACE 1 TABLET  UNDER THE TONGUE EVERY 5 MINS AS NEEDED FOR CHEST PAIN FOR UP TO 3 DOSES.IF CONTINUED HEART PAIN/PRESSURE AFTER 100 tablet 0    ondansetron (ZOFRAN) 4 MG tablet TAKE 1 TABLET EVERY 12 HOURS AS NEEDED FOR NAUSEA 16 tablet 0    pantoprazole (PROTONIX) 40 MG tablet Take 1 tablet (40 mg total) by mouth once daily. 90 tablet 3    polyethylene glycol (GLYCOLAX) 17 gram PwPk Take 17 g by mouth once daily.      pregabalin (LYRICA) 50 MG capsule Take 1 capsule (50 mg total) by mouth every evening. 30 capsule 6    sertraline (ZOLOFT) 25 MG tablet Take 1 tablet (25 mg total) by mouth once daily. 30 tablet 11    sodium bicarbonate 650 MG tablet Take 1 tablet (650 mg total) by mouth 2 (two) times daily. 60 tablet 1    temazepam (RESTORIL) 30 mg capsule Take 1 capsule (30 mg total) by mouth nightly as needed for Insomnia. 30 capsule 1    tiZANidine (ZANAFLEX) 2 MG tablet Take 2 tablets (4 mg total) by mouth 2 (two) times a day. 120 tablet 5    vitamin E 400 UNIT capsule Take 400 Units by mouth once daily.       No current facility-administered medications on file prior to visit.

## 2025-05-27 NOTE — ASSESSMENT & PLAN NOTE
Stable, enzymes flattened.   05/27/2025  -Stat troponin during episode of CP lower than previous lab draw  -CP improved to 5/10 severity after nitro SL x 2 doses  -Transfer to [facility] initiated  -Transplant team to see as consult

## 2025-05-27 NOTE — ASSESSMENT & PLAN NOTE
Stable, enzymes flattened.   05/27/2025  -Stat troponin during episode of CP lower than previous lab draw  -CP improved to 5/10 severity after nitro SL x 2 doses  -Transfer to [facility] initiated  -Transplant team to see as consult

## 2025-05-28 ENCOUNTER — OFFICE VISIT (OUTPATIENT)
Dept: PALLIATIVE MEDICINE | Facility: CLINIC | Age: 70
End: 2025-05-28
Payer: MEDICARE

## 2025-05-28 VITALS
DIASTOLIC BLOOD PRESSURE: 97 MMHG | SYSTOLIC BLOOD PRESSURE: 161 MMHG | TEMPERATURE: 97 F | HEIGHT: 62 IN | BODY MASS INDEX: 25.03 KG/M2 | WEIGHT: 136 LBS | OXYGEN SATURATION: 100 % | HEART RATE: 106 BPM

## 2025-05-28 DIAGNOSIS — Z51.5 ENCOUNTER FOR PALLIATIVE CARE: ICD-10-CM

## 2025-05-28 DIAGNOSIS — J02.9 PHARYNGITIS, UNSPECIFIED ETIOLOGY: Primary | ICD-10-CM

## 2025-05-28 DIAGNOSIS — I25.758: Chronic | ICD-10-CM

## 2025-05-28 DIAGNOSIS — Z94.1 HEART TRANSPLANTED: ICD-10-CM

## 2025-05-28 PROCEDURE — 99999 PR PBB SHADOW E&M-EST. PATIENT-LVL III: CPT | Mod: PBBFAC,HCNC,, | Performed by: NURSE PRACTITIONER

## 2025-05-28 RX ORDER — AMOXICILLIN 500 MG/1
500 CAPSULE ORAL EVERY 12 HOURS
Qty: 20 CAPSULE | Refills: 0 | Status: SHIPPED | OUTPATIENT
Start: 2025-05-28 | End: 2025-06-08

## 2025-05-28 NOTE — ASSESSMENT & PLAN NOTE
Goals of care: What is most important right now is to focus on remaining as independent as possible, symptom/pain control, and improvement in condition but with limits to invasive therapies.  Advanced directive: In discussions with PCP after cardiac arrest and subsequent resuscitation event Mrs Damon has completed LaPOST now confirming DNR, limited interventions. We reviewed again and her wishes are unchanged.   HC POA: Moy Halle, son  Symptoms: throat pain, generalized weakness.  Follow up: 3 months

## 2025-05-28 NOTE — PROGRESS NOTES
Palliative Medicine Clinic Note    Chief Complaint: No chief complaint on file.       ASSESSMENT/PLAN:      Plan/Recommendations:  1. Pharyngitis, unspecified etiology  Assessment & Plan:  Previously followed by Dr Godinez, mucous retention cysts and dysphonia.   Also recently intubated likely causing discomfort.   Immunocompromised and redness to area. Rx Amoxil. Will message PCP.    Orders:  -     amoxicillin (AMOXIL) 500 MG capsule; Take 1 capsule (500 mg total) by mouth every 12 (twelve) hours. for 10 days  Dispense: 20 capsule; Refill: 0    2. Heart transplanted  Assessment & Plan:  Followed by transplant cardiology.   Continues cyclosporin.         3. 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   Continues beta blocker, Ca channel blocker       4. Encounter for palliative care  Assessment & Plan:  Goals of care: What is most important right now is to focus on remaining as independent as possible, symptom/pain control, and improvement in condition but with limits to invasive therapies.  Advanced directive: In discussions with PCP after cardiac arrest and subsequent resuscitation event Mrs Damon has completed LaPOST now confirming DNR, limited interventions. We reviewed again and her wishes are unchanged.   HC POA: Moy Neri, son  Symptoms: throat pain, generalized weakness.  Follow up: 3 months            Advance Care Planning   Advance Care Planning         Thank you for involving me in the care of this patient.     No follow-ups on file.    Future Appointments   Date Time Provider Department Center   5/29/2025  9:45 AM Jassi Pierre MD [facility] INT JUDIT [facility]   6/4/2025 10:20 AM Elis Wick MD [facility] 65[facility] 65+ [facility]   6/9/2025  1:45 PM Paxton Vasques, VIRA ONLC [facility]   6/16/2025 10:40 AM RONNY BRANTLEY CC LAB [facility] LAB DS [facility]    6/17/2025 11:00 AM Génesis Gonzales LCSW [facility] 65[facility] 65+ [place]   6/19/2025  8:00 AM Yudith Mendoza NP [facility] HEM ONC [facility]   6/19/2025  8:15 AM INJECTION 1, [facility] INFUSION [facility] INFSN [facility]   8/27/2025  9:30 AM LABORATORY, [facility] ON[facility] LAB [facility]   9/3/2025  1:00 PM Gunner Melgar MD ONLC [facility]   10/13/2025  9:00 AM Christoph Douglas MD [facility]        SUBJECTIVE:      History of Present Illness / Interval History:  Nadia Damon is 69 y.o. female with HFpEF, s/p heart transplant 1993, patent foramen ovale in transplanted heart, CKD 4-5, anemia of chronic disease, breast cancer, spinal stenosis, urinary incontinence.       Presents to Palliative Care Clinic for symptom mgmt, clarification of goals of care, and additional support.      5/28/25  History obtained from: patient and medical record.    Since her last office visit she was hospitalized in May with chest pain. While she was in the hospital she suffered a cardiac arrest resulting in respiratory failure and acute kidney injury. Fortunately with medical management she improved. Since being discharged she continues to have some chest wall discomfort due to having had chest compressions done. Creatinine has subsequently returned to better than her usual baseline of 2.8.     Prior to this event Mrs Damon desired full code and RRT for ESRD. However, since then she has opted for natural death, DNR. Completed LaPOST with PCP!   Lots of pain and discomfort after resuscitation event. Still recovering.   No longer having CP since beginning long acting NTG.     Not sleeping well because of rib pain at night. Some relief with tizanidine.   Going to have help through waiver program - begins this week.     Much more peaceful today. Feels comfortable with her life and possibility of death now.     Antibiotic for mucus retention cysts?   Estrellasmarlyn [facility].     Today we discussed symptom management, goals of care, and advanced care  planning.        ROS:  Review of Systems    Palliative Exam    Medications:  Current Medications[1]    External  database queried on 05/28/2025  by Luz CORTEZ :     N/A    Review of patient's allergies indicates:   Allergen Reactions    Dobutamine Other (See Comments)     Severe Left sided chest pain after dosage administered for Dobutamine Stress Test; itching    Lisinopril Swelling and Rash    Hydrocodone-acetaminophen Nausea Only    Augmentin [amoxicillin-pot clavulanate] Diarrhea    Zyvox [linezolid] Nausea And Vomiting        OBJECTIVE:   Physical Exam:  Vitals: Temp: 97.3 °F (36.3 °C) (05/28/25 1122)  Pulse: 106 (05/28/25 1122)  BP: (!) 161/97 (05/28/25 1122)  SpO2: 100 % (05/28/25 1122)    Physical Exam  Constitutional:       General: She is not in acute distress.  HENT:      Mouth/Throat:      Mouth: Mucous membranes are moist.      Pharynx: Posterior oropharyngeal erythema present. No pharyngeal swelling or uvula swelling.      Tonsils: No tonsillar exudate.      Comments: Multiple cysts right posterior pharynx with surrounding erythema.   Eyes:      General: No scleral icterus.  Cardiovascular:      Rate and Rhythm: Normal rate and regular rhythm.   Pulmonary:      Effort: Pulmonary effort is normal.   Abdominal:      Palpations: Abdomen is soft.      Tenderness: There is no abdominal tenderness.   Musculoskeletal:      Cervical back: Tenderness (right submandibular tenderness) present. No rigidity.   Lymphadenopathy:      Cervical: No cervical adenopathy.   Neurological:      Mental Status: She is alert.      Gait: Gait abnormal (steady with rolling walker).   Psychiatric:         Mood and Affect: Mood normal.         Behavior: Behavior normal.         Thought Content: Thought content normal.         Judgment: Judgment normal.         Wt Readings from Last 3 Encounters:   05/28/25 1122 61.7 kg (136 lb 0.4 oz)   05/27/25 1424 62.5 kg (137 lb 12.6 oz)   05/22/25 1006 62.5 kg (137 lb 12.6 oz)      BP Readings from Last 3 Encounters:   05/28/25 (!) 161/97   05/27/25 130/60   05/22/25 125/71       Labs:  Lab Results   Component Value Date    WBC 3.77 (L) 05/20/2025    HGB 10.3 (L) 05/20/2025    HCT 32.0 (L) 05/20/2025    MCV 90 05/20/2025     05/20/2025           Imaging:  reviewed     I spent a total of 38 minutes on the day of the visit. This includes face to face time in discussion of goals of care, symptom assessment, coordination of care and emotional support.  This also includes non-face to face time preparing to see the patient (eg, review of tests/imaging), obtaining and/or reviewing separately obtained history, documenting clinical information in the electronic or other health record, independently interpreting results and communicating results to the patient/family/caregiver, or care coordinator.     Additional 16 min time spent on a voluntary advance care planning and /or goals of care discussion, providing emotional support, formulating and communicating prognosis and exploring burden/benefit of various approaches of treatment.       Luz Dickson NP         [1]   Current Outpatient Medications:     aspirin (ECOTRIN) 81 MG EC tablet, Take 1 tablet (81 mg total) by mouth once daily., Disp: , Rfl:     calcitRIOL (ROCALTROL) 0.25 MCG Cap, Take 1 capsule (0.25 mcg total) by mouth once daily., Disp: 30 capsule, Rfl: 11    carvediloL (COREG) 3.125 MG tablet, TAKE 1 TABLET TWICE DAILY WITH MEALS, Disp: 180 tablet, Rfl: 3    cycloSPORINE (NEORAL) 100 mg/mL microemulsion solution, Take 0.5 mLs (50 mg total) by mouth every 12 (twelve) hours., Disp: 50 mL, Rfl: 12    furosemide (LASIX) 40 MG tablet, Take ½ tablet (20 mg total) by mouth once daily., Disp: 30 tablet, Rfl: 11    isosorbide mononitrate (IMDUR) 60 MG 24 hr tablet, Take 1 tablet (60 mg total) by mouth once daily., Disp: 30 tablet, Rfl: 11    NIFEdipine (PROCARDIA-XL) 60 MG (OSM) 24 hr tablet, Take 1 tablet (60 mg total) by mouth 2  (two) times a day., Disp: 60 tablet, Rfl: 11    nitroGLYCERIN (NITROSTAT) 0.4 MG SL tablet, PLACE 1 TABLET UNDER THE TONGUE EVERY 5 MINS AS NEEDED FOR CHEST PAIN FOR UP TO 3 DOSES.IF CONTINUED HEART PAIN/PRESSURE AFTER, Disp: 100 tablet, Rfl: 0    ondansetron (ZOFRAN) 4 MG tablet, TAKE 1 TABLET EVERY 12 HOURS AS NEEDED FOR NAUSEA, Disp: 16 tablet, Rfl: 0    pantoprazole (PROTONIX) 40 MG tablet, Take 1 tablet (40 mg total) by mouth once daily., Disp: 90 tablet, Rfl: 3    polyethylene glycol (GLYCOLAX) 17 gram PwPk, Take 17 g by mouth once daily., Disp: , Rfl:     pregabalin (LYRICA) 50 MG capsule, Take 1 capsule (50 mg total) by mouth every evening., Disp: 30 capsule, Rfl: 6    sertraline (ZOLOFT) 25 MG tablet, Take 1 tablet (25 mg total) by mouth once daily., Disp: 30 tablet, Rfl: 11    sodium bicarbonate 650 MG tablet, Take 1 tablet (650 mg total) by mouth 2 (two) times daily., Disp: 60 tablet, Rfl: 1    temazepam (RESTORIL) 30 mg capsule, Take 1 capsule (30 mg total) by mouth nightly as needed for Insomnia., Disp: 30 capsule, Rfl: 1    tiZANidine (ZANAFLEX) 2 MG tablet, Take 2 tablets (4 mg total) by mouth 2 (two) times a day., Disp: 120 tablet, Rfl: 5    vitamin E 400 UNIT capsule, Take 400 Units by mouth once daily., Disp: , Rfl:     amoxicillin (AMOXIL) 500 MG capsule, Take 1 capsule (500 mg total) by mouth every 12 (twelve) hours. for 10 days, Disp: 20 capsule, Rfl: 0

## 2025-05-28 NOTE — ASSESSMENT & PLAN NOTE
Previously followed by Dr Godinez, mucous retention cysts and dysphonia.   Also recently intubated likely causing discomfort.   Immunocompromised and redness to area. Rx Amoxil. Will message PCP.

## 2025-05-28 NOTE — Clinical Note
Hats off to you for completing LaPOST with Ms Zuniga! She looked pretty amazing today considering what she's been through. Much less anxious/distressed.  Right side of her throat is sore. We discussed likely from intubation. She has multiple cysts to posterior pharynx on right with some surrounding erythema. I'm treating with Amoxil since she's immunocompromised. She was previously seeing Dr Godinez for this, LOV 11/2024. I asked her to f/u with him if sx worsen or do not improve. Thoughts?  I can sign off on her. I mentioned this and she did not look pleased. I put in for a 3 month f/u but could also be PRN.

## 2025-05-28 NOTE — ASSESSMENT & PLAN NOTE
Followed by cardiology  Angina much improved since beginning Imdur.   Continues beta blocker, Ca channel blocker

## 2025-05-29 ENCOUNTER — OFFICE VISIT (OUTPATIENT)
Dept: PAIN MEDICINE | Facility: CLINIC | Age: 70
End: 2025-05-29
Payer: MEDICARE

## 2025-05-29 VITALS
DIASTOLIC BLOOD PRESSURE: 87 MMHG | HEART RATE: 117 BPM | HEIGHT: 62 IN | BODY MASS INDEX: 25.19 KG/M2 | SYSTOLIC BLOOD PRESSURE: 152 MMHG | WEIGHT: 136.88 LBS | RESPIRATION RATE: 17 BRPM

## 2025-05-29 DIAGNOSIS — M47.816 LUMBAR SPONDYLOSIS: Primary | ICD-10-CM

## 2025-05-29 DIAGNOSIS — S20.219S CONTUSION OF RIB, UNSPECIFIED LATERALITY, SEQUELA: ICD-10-CM

## 2025-05-29 DIAGNOSIS — M51.360 DEGENERATION OF INTERVERTEBRAL DISC OF LUMBAR REGION WITH DISCOGENIC BACK PAIN: ICD-10-CM

## 2025-05-29 DIAGNOSIS — M47.814 THORACIC SPONDYLOSIS: ICD-10-CM

## 2025-05-29 PROCEDURE — 99999 PR PBB SHADOW E&M-EST. PATIENT-LVL IV: CPT | Mod: PBBFAC,HCNC,, | Performed by: ANESTHESIOLOGY

## 2025-05-29 RX ORDER — LIDOCAINE 50 MG/G
1 PATCH TOPICAL DAILY PRN
Qty: 30 PATCH | Refills: 0 | Status: SHIPPED | OUTPATIENT
Start: 2025-05-29 | End: 2025-05-29

## 2025-05-29 RX ORDER — LIDOCAINE 50 MG/G
1 PATCH TOPICAL DAILY PRN
Qty: 30 PATCH | Refills: 0 | Status: SHIPPED | OUTPATIENT
Start: 2025-05-29

## 2025-05-29 NOTE — PROGRESS NOTES
Interventional Pain Progress Note     Referring Physician: No ref. provider found    PCP: Elis Wick MD    Chief Complaint: Low Back Pain    Interval History (05/29/2025):      Patient returns to clinic for evaluation of lower back pain.  Patient was initially proceeding with lumbar RFA however procedure was canceled due to increased chest pain today of the procedure.  Patient was subsequently admitted to inpatient care where she suffered a cardiac arrest undergoing 1 round of ACLS..  Patient was intubated and recovered.  After being discharged from the hospital, she was admitted again for chest pain and noted to be anemic and in acute renal failure.  This improved with rehydration and transfusion.   Primary pain complaint today is aching stabbing pain in the band across her lower back now both on her left and right side.  Patient also reports increase pain over her bilateral ribs after chest compressions.  Pain is worse with rotation and extension, better with rest and ice and heat.  Pain is currently rated a 9/10.      Interval History (3/7/2025):  Patient Nadia Damon presents today for follow-up visit.  Patient states since she was seen she fell off her cough onto her side and buttocks. She rates her pain 10/10 and states it is in the lower back and radiating to the hips. At times she feels burning down the sides of the thighs. Pain is worse to extend back and stand up straight and walking.   Patient denies night fever/night sweats, urinary incontinence, bowel incontinence, significant weight loss and significant motor weakness.   Patient denies any other complaints or concerns at this time.      Interval History (3/4/2025):  Patient Nadia Damon presents today for follow-up visit.  Patient was last seen on 2/6/2025 for her 1st left L4/5 + L5/S1 lumbar MBB which provided 90% relief.she would like to move forward with the 2nd MBB.  She also had a fall recently and had a CT lumbar to evaluate  for any fractures. No acute findings or fracture noted.  Pain continues to be 9/10 and primarily axial in the low back without radiculopathy.   Worse with prolonged standing and walking.   Patient denies night fever/night sweats, urinary incontinence, bowel incontinence, significant weight loss and significant motor weakness.   Patient denies any other complaints or concerns at this time.      Interval History (1/8/2025):  Patient Nadia Damon presents today for follow-up visit.  Patient was last seen on 12/5/2024 Right L4/5 + L5/S1 RFA 50% relief. She is doing better ambulating without her walker and cane. Pain not going into the legs currently. She rates her pain today 7/10. She continues to have left sided pain and inquires about doing the RFA on the left. Pain is worse with prolonged standing and does not radiate. Pain is primarily axial.  She has finished rehab/PT following her stroke.  Patient denies night fever/night sweats, urinary incontinence, bowel incontinence, significant weight loss and significant motor weakness.   Patient denies any other complaints or concerns at this time.    Interval History (11/20/2024):  Patient Nadia Damon presents today for follow-up visit.  Patient was last seen on9/16/2024 T11/12 IL HELLEN with 85% relief. She had a stroke in July and saw neurosurgery following the stroke and was not interested in any surgical intervention. Overall she went through rehab and is improving in mobility and function. She continues to have axial low back pain with the right side worse than the left. No radiculopathy. Pain worse with standing and prolonged walking. Relief with leaning forward. She rates her pain today 7/10.  Patient denies night fever/night sweats, urinary incontinence, bowel incontinence, significant weight loss and significant motor weakness.   Patient denies any other complaints or concerns at this time.    Interval History (3/3/2023):  Patient returns to clinic after  procedure.  Patient reports 100% relief and thoracic pain after T11-T12 interlaminar epidural steroid injection on 02/02/2023.  Patient reports occasional throbbing pain across her right lower back with no radiation to her buttocks or lower extremities.  Pain is worse with lifting and prolonged standing, better with heat and topicals.  Pain is rated as a year out 10 currently. Denies any fevers, chills, changes in gait, weakness, or bowel and bladder incontinence    Interval History (1/13/22):  Patient returns to clinic for evaluation of thoracic pain.  Patient reports approximately 2 weeks ago she had sudden onset of lower thoracic pain.  She denies any significant inciting factors to his pain.  Pain starts as a sharp stabbing pain in her lower midline thoracic area and then radiates to her right side to her anterior chest wall.  Pain is worse with standing and walking, better with lying supine.  Pain is rated an 8/10.  Patient reports that this pain is significantly different from her previous lumbar pain. Denies any fevers, chills,  or bowel and bladder incontinence    Interval History (9/30/22):  Patient returns to clinic after procedure.  Patient reports complete resolution of right lower extremity pain after right sacroiliac joint injection and right L5-S1 facet injection on 08/22/2022.  Primary pain today is tight aching pain in right side of lower back.  Pain is worse with extension and rotation, better with rest and heat.  Patient does report an episode of physical therapy on 09/21/2022 where she experienced increased muscle aches and weakness.  She reports that since this time her symptoms have greatly improved.  She denies any significant weakness today.  Pain is rated a 7/10. Denies any fevers, chills, changes in gait, weakness, or bowel and bladder incontinence        SUBJECTIVE:    Nadia Damon is a 69 y.o. female who presents to the clinic for the evaluation of lower back pain. The pain started 1  year ago and symptoms have been worsening.The pain is located in the right lower back and right buttocks area with radiation to the posterior lateral aspect of her right thigh and occasionally her right calf.  The pain is described as aching, shooting and stabbing and is rated as 5/10.   The pain is rated with a score of  2/10 on the BEST day and a score of 9/10 on the WORST day.  Symptoms interfere with daily activity and work. The pain is exacerbated by Sitting, Standing, Extension and Flexing.  The pain is mitigated by physical therapy and rest.     Patient denies night fever/night sweats, urinary incontinence, bowel incontinence, significant weight loss, significant motor weakness and loss of sensations.      Non-Pharmacologic Treatments:  Physical Therapy/Home Exercise: yes  Ice/Heat:yes  TENS: no  Acupuncture: no  Massage: no  Chiropractic: no        Previous Pain Medications:  Tylenol, topicals, neuropathic,      report:  Reviewed and consistent with medication use as prescribed.    Pain Procedures:   2023:  T11-12 interlaminar epidural steroid injection, 100% relief  22:  Right L4-5 and L5-S1 radiofrequency ablation, 75% relief  22:  Right sacroiliac joint and right L5-S1 facet injection,   Resolution of right lower extremity pain  2024 T11/12 IL HELLEN with 85% relief    Imagin2025 CT lumbar  FINDINGS:  Minor grade 1 2 mm L4-5 spondylolisthesis is present.     Advanced T11-12 disc degeneration is present with high-grade disc narrowing and endplate sclerosis.  T10-11 and T11-12 vacuum disc phenomenon are evident.     T11-12: Severe disc degeneration as detailed above.  Mild to moderate right foraminal stenosis.     T12-L1: Mild disc bulge.     L1-2: Unremarkable.     L2-3: Unremarkable.     L3-4: Mild disc bulge with mild facet arthrosis.  Interspinous process bursitis.     L4-5: Mild degenerative disc narrowing with disc bulge.  Moderate to severe facet arthrosis with 2 mm  spondylolisthesis.     L5-S1: Minor disc bulge.  Sacralized transverse processes with right pseudoarticulation.  Mild left foraminal stenosis.     Low-grade SI joint arthrosis.     Impression:     No acute abnormality.  Advanced T11-12 disc degeneration.     Mild degenerative changes L3-4, L4-5 and L5-S1 as detailed above.    8/27/2024 CT lumbar  FINDINGS:  No vertebral body compression deformity.  Normal bone mineral density.  Normal alignment of the vertebral bodies.  No soft tissue abnormality.  Mild moderate multilevel degenerative disc disease.  Atherosclerotic changes.     Impression:     No acute fracture or dislocation.  Mild moderate multilevel degenerative disc disease.  See recent myelogram for additional insights.    CT THORACIC SPINE WITHOUT CONTRAST 1/9/23     CLINICAL HISTORY:  Myelopathy, acute, thoracic spine;  Radiculopathy, thoracic region     TECHNIQUE:  Helical axial images are acquired through the thoracic spine without IV contrast.  Images were reformatted in the coronal sagittal plane.     COMPARISON:  None     FINDINGS:  Paraspinal soft tissues appear within normal limits.  Partially visualized lung fields demonstrate band like opacity at the lingula.  Favor scarring/atelectasis.  Partially visualized abdominal contents are within normal limits.     Alignment is within normal limits.  There is no anterolisthesis or retrolisthesis.  Vertebral body heights are relatively well preserved.  There appears to be a chronic fracture deformity along the posterior spinous process of C7.  Borders of the bone fragment are well corticated.  No evidence of acute injury.  Partially visualized ribs appear intact.     Evaluation for central canal narrowing is limited without intrathecal contrast.     Multilevel degenerative disc changes are present, worst at the mid to lower thoracic spine.  Degenerative disc changes are worst at T11-T12.  There is a 5 mm disc bulge/herniation at T11-T12.  Disc  bulge/herniation is slightly high density with minimal peripheral calcification.     No bony neural foraminal narrowing from C7 to T11.  There is moderate to severe right and mild-to-moderate left neural foraminal narrowing.     Impression:     1. Degenerative disc changes, worst at T11-T12.  Evaluation for central canal narrowing is limited without intrathecal contrast.  2. Bony neural foraminal narrowing is worst at the right T11-T12 foramen.         Results for orders placed during the hospital encounter of 07/13/22    X-Ray Lumbar Spine 5 View    Narrative  EXAMINATION:  XR LUMBAR SPINE COMPLETE 5 VIEW    CLINICAL HISTORY:  Sacrococcygeal disorders, not elsewhere classified    TECHNIQUE:  AP, lateral, spot and bilateral oblique views of the lumbar spine were performed.    COMPARISON:  Lumbar spine radiographs May 17, 2018    FINDINGS:  Minimal grade 1 anterolisthesis of L4 on L5.  No fracture or pars defect.  Unchanged mild disc height loss at the L4-L5 level.  Moderate to severe degenerative disc disease at the T11-T12 level.  Prominent inferior lumbar spine facet arthropathy.  Sacroiliac joints appear normal.  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  No osseous erosion or suspicious osseous lesion.    Impression  As above.      Electronically signed by: Isac Bell  Date:    07/13/2022  Time:    15:39          Past Medical History:   Diagnosis Date    Abdominal wall hernia     CT Renal 6/11/2018---Small fat containing superior ventral abdominal wall hernia at the epicardial pacing lead site.    Abnormal mammogram 10/12/2021    Acute cystitis without hematuria 05/10/2022    Acute ischemic right middle cerebral artery (MCA) stroke 07/20/2024    Adverse drug reaction 11/12/2024    GRACE (acute kidney injury) 11/22/2021    Anxiety     Aphasia 07/19/2024    Arthritis     ZEN HIPS    Breast cancer in female 08/2021    LEFT BREAST    Breast mass, right  11/13/2024    RIGHT BREAST MASS (Problem)      Bronchitis 08/18/2016    Never smoked      C. difficile colitis 11/29/2021    Cellulitis of axilla, left 12/23/2021    Chronic diastolic heart failure 12/16/2021    Chronic kidney disease     stage 4, GFR 15-29 ml/min    Chronic midline low back pain without sciatica 06/18/2018    CKD (chronic kidney disease) stage 4, GFR 15-29 ml/min 04/20/2016    US Retro   5/16/2018---Mild cortical thinning and increased cortical echogenicity.  Findings can be seen with medical renal disease.  8/31/216--- Echogenic kidneys with diffuse cortical thinning suggesting  medical renal disease. 2 complex right renal cortical cysts.      Closed nondisplaced fracture of distal phalanx of left great toe with routine healing 10/22/2018    Coronary artery disease 1993    heart transplant    COVID-19 in immunocompromised patient 02/26/2024    Cystitis 05/10/2022    Depression     Encounter for blood transfusion     Encounter for palliative care 10/14/2024    Endotracheally intubated 04/28/2025    Fibromyalgia     on Lyrica    Heart failure     native heart cardiomyopathy    Heart transplanted 1993    due to cardiomyopathy    History of hyperparathyroidism; Hyperparathyroidism, secondary renal     PT DENIES    Hypertension     Immune disorder     anti rejection meds    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    Impaired mobility 07/28/2022    Iron deficiency anemia 08/15/2017    Kidney stones     passed per pt    Lower extremity edema 12/11/2024    Multifactoral: CKD stage 5, CHF, venous insufficiency      Obesity     Obesity (BMI 30.0-34.9) 07/22/2019    Other osteoporosis without current pathological fracture 08/30/2019    Other pancytopenia 05/06/2024    Palliative care encounter 04/30/2025    Parotitis, acute 09/19/2023    Pharyngitis 01/09/2019    Pneumonia due to infectious organism 03/14/2024    PONV (postoperative nausea and vomiting)     Rapid palpitations 10/16/2024     Severe sepsis 11/22/2021    Shingles 2003 approx    left leg    Stage 4 chronic kidney disease 11/22/2021    Subclinical hypothyroidism 06/16/2023    Syncope 05/13/2025    Thrombocytopenia, unspecified 11/29/2021    Trouble in sleeping     Unresponsiveness 11/13/2024    Urinary incontinence      Past Surgical History:   Procedure Laterality Date    bladder implant Right 06/11/2024    BLADDER SURGERY  2015 approx    mesh - Dr Everett then 2nd reconstructive sx Dr Onofre    BREAST BIOPSY Bilateral     NEGATIVE    BREAST BIOPSY Right 10/31/2022    benign    BREAST LUMPECTOMY Left 2021    BREAST SURGERY Left 09/28/2015    Bx - benign    BREAST SURGERY Right 12/2015    Bx benign    CARDIAC PACEMAKER REMOVAL Left 06/26/2014    Pacer defirillator removed. Put in 1993 aat time of heart transplant    CARPAL TUNNEL RELEASE Left 03/03/2015    Dr. Hall    COLONOSCOPY N/A 02/25/2021    Procedure: COLONOSCOPY;  Surgeon: Freida Ramirez MD;  Location: [facility];  Service: Endoscopy;  Laterality: N/A;    CYSTOCELE REPAIR      Twice with mesh removal    ECHOCARDIOGRAM,TRANSESOPHAGEAL N/A 4/26/2025    Procedure: Transesophageal echo (AYAAN) intra-procedure log documentation;  Surgeon: Barron Chinchilla MD;  Location: [facility] OR [address] [place] [address];  Service: Cardiothoracic;  Laterality: N/A;    EPIDURAL STEROID INJECTION INTO CERVICAL SPINE N/A 02/02/2023    Procedure: T11/T12 IL HELLEN;  Surgeon: Jassi Pierre MD;  Location: [facility];  Service: Pain Management;  Laterality: N/A;    EPIDURAL STEROID INJECTION INTO CERVICAL SPINE N/A 9/16/2024    Procedure: T11/T12 IL HELLEN;  Surgeon: Jassi Pierre MD;  Location: [facility];  Service: Pain Management;  Laterality: N/A;    HEART TRANSPLANT  1993    HERNIA REPAIR Right 1971 approx    Inguinal    HYSTERECTOMY  1983    vag hyst /LSO     IMPLANTATION OF PERMANENT SACRAL NERVE STIMULATOR N/A 06/11/2024    Procedure: INSERTION, NEUROSTIMULATOR, PERMANENT, SACRAL;  Surgeon: Dimas  Sami LYONS MD;  Location: [facility] OR;  Service: Urology;  Laterality: N/A;    INCISION AND DRAINAGE OF ABSCESS Left 12/24/2021    Procedure: INCISION AND DRAINAGE, ABSCESS;  Surgeon: Joseph Longo MD;  Location: [facility] OR;  Service: General;  Laterality: Left;    INJECTION OF ANESTHETIC AGENT AROUND MEDIAL BRANCH NERVES INNERVATING LUMBAR FACET JOINT Right 10/19/2022    Procedure: Right L4/L5 and L5/S1 MBB;  Surgeon: Jassi Pierre MD;  Location: [facility]IN [facility]GT;  Service: Pain Management;  Laterality: Right;    INJECTION OF ANESTHETIC AGENT AROUND MEDIAL BRANCH NERVES INNERVATING LUMBAR FACET JOINT Right 11/09/2022    Procedure: Right L4/L5 and L5/S1 MBB;  Surgeon: Jassi Pierre MD;  Location: [facility];  Service: Pain Management;  Laterality: Right;    INJECTION OF ANESTHETIC AGENT AROUND MEDIAL BRANCH NERVES INNERVATING LUMBAR FACET JOINT Left 2/6/2025    Procedure: Left L4-5 and L5-S1 MBB #1 with local;  Surgeon: Jassi Pierre MD;  Location: [facility];  Service: Pain Management;  Laterality: Left;    INJECTION OF ANESTHETIC AGENT AROUND MEDIAL BRANCH NERVES INNERVATING LUMBAR FACET JOINT Left 3/19/2025    Procedure: Left L4-5 and L5-S1 MBB #2 with local;  Surgeon: Jassi Pierre MD;  Location: [facility];  Service: Pain Management;  Laterality: Left;    INJECTION OF ANESTHETIC AGENT INTO SACROILIAC JOINT Right 08/22/2022    Procedure: Right SIJ Injection Right L5/S1 Facte Injection;  Surgeon: Jassi Pierre MD;  Location: [facility];  Service: Pain Management;  Laterality: Right;    INSERTION OF TUNNELED CENTRAL VENOUS CATHETER (CVC) WITH SUBCUTANEOUS PORT N/A 11/09/2021    Procedure: [id number];  Surgeon: Christoph Douglas MD;  Location: [facility] OR;  Service: General;  Laterality: N/A;    RADIOFREQUENCY ABLATION Right 12/5/2024    Procedure: Right L4-5 and L5-S1 RFA;  Surgeon: Jassi Pierre MD;  Location: [facility];  Service: Pain Management;  Laterality: Right;     RADIOFREQUENCY THERMOCOAGULATION Right 2022    Procedure: Right L4/L5 and L5/S1 Lumbar RFA;  Surgeon: Jassi Pierre MD;  [facility];  Service: Pain Management;  Laterality: Right;    REMOVAL OF ELECTRODE LEAD OF SACRAL NERVE STIMULATOR N/A 2025    Procedure: REMOVAL, ELECTRODE LEAD, SACRAL NERVE STIMULATOR;  Surgeon: Sami Cochran MD;  Location: [facility] OR;  Service: Urology;  Laterality: N/A;    REMOVAL OF VASCULAR ACCESS PORT      ROBOT-ASSISTED LAPAROSCOPIC ABDOMINAL SACROCOLPOPEXY N/A 08/10/2023    Procedure: ROBOTIC SACROCOLPOPEXY, ABDOMEN;  Surgeon: PRANAY Villalobos MD;  Location: [facility] OR;  Service: OB/GYN;  Laterality: N/A;    ROBOT-ASSISTED LAPAROSCOPIC OOPHORECTOMY Right 08/10/2023    Procedure: ROBOTIC OOPHORECTOMY;  Surgeon: PRANAY Villalobos MD;  Location: [facility] OR;  Service: OB/GYN;  Laterality: Right;    SENTINEL LYMPH NODE BIOPSY Left 10/12/2021    Procedure: BIOPSY, LYMPH NODE, SENTINEL;  Surgeon: Christoph Douglas MD;  Location: [facility] OR;  Service: General;  Laterality: Left;    TOE SURGERY      XI ROBOTIC URETHROPEXY N/A 08/10/2023    Procedure: XI ROBOTIC URETHROPEXY;  Surgeon: PRANAY Villalobos MD;  Location: [facility] OR;  Service: OB/GYN;  Laterality: N/A;     Social History     Socioeconomic History    Marital status: Single    Number of children: 2    Highest education level: 11th grade   Occupational History    Occupation: Retired   Tobacco Use    Smoking status: Never     Passive exposure: Never    Smokeless tobacco: Never   Substance and Sexual Activity    Alcohol use: Never     Alcohol/week: 0.0 standard drinks of alcohol    Drug use: No    Sexual activity: Not Currently     Partners: Male     Birth control/protection: See Surgical Hx   Other Topics Concern    Are you pregnant or think you may be? No    Breast-feeding No   Social History Narrative    Single. 2 children , 1  at 30 yoa   strep throat -  pneumonia and renal complications after not  completing course of AB. Other child lives in [place], [place]. Has a cousin locally that could help in an emergency. 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 Caffeine intake =- 1 cola a day. No coffee, + occasional tea, avoids caffeine especially at night. Still drives. She does not have a Living Will or Advanced directive.      Social Drivers of Health     Financial Resource Strain: Low Risk  (5/17/2025)    Overall Financial Resource Strain (CARDIA)     Difficulty of Paying Living Expenses: Not hard at all   Recent Concern: Financial Resource Strain - Medium Risk (5/2/2025)    Overall Financial Resource Strain (CARDIA)     Difficulty of Paying Living Expenses: Somewhat hard   Food Insecurity: No Food Insecurity (5/17/2025)    Hunger Vital Sign     Worried About Running Out of Food in the Last Year: Never true     Ran Out of Food in the Last Year: Never true   Transportation Needs: No Transportation Needs (5/17/2025)    PRAPARE - Transportation     Lack of Transportation (Medical): No     Lack of Transportation (Non-Medical): No   Recent Concern: Transportation Needs - Unmet Transportation Needs (5/2/2025)    PRAPARE - Transportation     Lack of Transportation (Medical): Yes     Lack of Transportation (Non-Medical): No   Physical Activity: Inactive (5/17/2025)    Exercise Vital Sign     Days of Exercise per Week: 0 days     Minutes of Exercise per Session: 0 min   Stress: No Stress Concern Present (5/17/2025)    [employer] - Occupational Stress Questionnaire     Feeling of Stress : Only a little   Recent Concern: Stress - Stress Concern Present (5/13/2025)    [employer] - Occupational Stress Questionnaire     Feeling of Stress : To some extent   Housing Stability: High Risk (5/17/2025)    Housing Stability Vital Sign     Unable to Pay for Housing in the Last Year: No     Number of Times Moved in the Last Year: 4     Homeless in  the Last Year: No     Family History   Problem Relation Name Age of Onset    Cancer Mother  38        breast    Breast cancer Mother      Breast cancer Maternal Grandmother      Heart disease Maternal Grandmother      Hypertension Son      Cataracts Cousin      Diabetes Neg Hx      Stroke Neg Hx      Kidney disease Neg Hx      Asthma Neg Hx      COPD Neg Hx      Melanoma Neg Hx      Hyperlipidemia Neg Hx         Review of patient's allergies indicates:   Allergen Reactions    Dobutamine Other (See Comments)     Severe Left sided chest pain after dosage administered for Dobutamine Stress Test; itching    Lisinopril Swelling and Rash    Hydrocodone-acetaminophen Nausea Only    Augmentin [amoxicillin-pot clavulanate] Diarrhea    Zyvox [linezolid] Nausea And Vomiting       Current Outpatient Medications   Medication Sig    amoxicillin (AMOXIL) 500 MG capsule Take 1 capsule (500 mg total) by mouth every 12 (twelve) hours. for 10 days    aspirin (ECOTRIN) 81 MG EC tablet Take 1 tablet (81 mg total) by mouth once daily.    calcitRIOL (ROCALTROL) 0.25 MCG Cap Take 1 capsule (0.25 mcg total) by mouth once daily.    carvediloL (COREG) 3.125 MG tablet TAKE 1 TABLET TWICE DAILY WITH MEALS    cycloSPORINE (NEORAL) 100 mg/mL microemulsion solution Take 0.5 mLs (50 mg total) by mouth every 12 (twelve) hours.    furosemide (LASIX) 40 MG tablet Take ½ tablet (20 mg total) by mouth once daily.    isosorbide mononitrate (IMDUR) 60 MG 24 hr tablet Take 1 tablet (60 mg total) by mouth once daily.    NIFEdipine (PROCARDIA-XL) 60 MG (OSM) 24 hr tablet Take 1 tablet (60 mg total) by mouth 2 (two) times a day.    nitroGLYCERIN (NITROSTAT) 0.4 MG SL tablet PLACE 1 TABLET UNDER THE TONGUE EVERY 5 MINS AS NEEDED FOR CHEST PAIN FOR UP TO 3 DOSES.IF CONTINUED HEART PAIN/PRESSURE AFTER    ondansetron (ZOFRAN) 4 MG tablet TAKE 1 TABLET EVERY 12 HOURS AS NEEDED FOR NAUSEA    pantoprazole (PROTONIX) 40 MG tablet Take 1 tablet (40 mg total) by mouth  "once daily.    polyethylene glycol (GLYCOLAX) 17 gram PwPk Take 17 g by mouth once daily.    pregabalin (LYRICA) 50 MG capsule Take 1 capsule (50 mg total) by mouth every evening.    sertraline (ZOLOFT) 25 MG tablet Take 1 tablet (25 mg total) by mouth once daily.    sodium bicarbonate 650 MG tablet Take 1 tablet (650 mg total) by mouth 2 (two) times daily.    temazepam (RESTORIL) 30 mg capsule Take 1 capsule (30 mg total) by mouth nightly as needed for Insomnia.    tiZANidine (ZANAFLEX) 2 MG tablet Take 2 tablets (4 mg total) by mouth 2 (two) times a day.    vitamin E 400 UNIT capsule Take 400 Units by mouth once daily.    LIDOcaine (LIDODERM) 5 % Place 1 patch onto the skin daily as needed (RIb Pain). Place patch over the affected area for up to 12 hours.  Please wait 12 hours before placing a new patch.     No current facility-administered medications for this visit.         ROS  Review of Systems   Constitutional:  Negative for chills, diaphoresis, fatigue and fever.   Respiratory:  Negative for chest tightness, shortness of breath, wheezing and stridor.    Cardiovascular:  Positive for chest pain and leg swelling.   Gastrointestinal:  Negative for blood in stool, diarrhea, nausea and vomiting.   Endocrine: Negative for cold intolerance and heat intolerance.   Genitourinary:  Positive for urgency. Negative for dysuria and hematuria.   Musculoskeletal:  Positive for arthralgias, back pain, gait problem and joint swelling. Negative for myalgias, neck pain and neck stiffness.   Skin:  Negative for rash.   Neurological:  Positive for weakness and numbness. Negative for tremors, seizures, speech difficulty, light-headedness and headaches.   Hematological:  Does not bruise/bleed easily.   Psychiatric/Behavioral:  Negative for agitation, confusion and suicidal ideas. The patient is not nervous/anxious.             OBJECTIVE:  BP (!) 152/87   Pulse (!) 117   Resp 17   Ht 5' 2" (1.575 m)   Wt 62.1 kg (136 lb 14.5 oz) "   LMP 06/20/1983 (Within Years)   BMI 25.04 kg/m²         Physical Exam  Constitutional:       General: She is not in acute distress.     Appearance: Normal appearance. She is not ill-appearing.   HENT:      Head: Normocephalic and atraumatic.      Nose: No congestion or rhinorrhea.      Mouth/Throat:      Mouth: Mucous membranes are moist.   Eyes:      Extraocular Movements: Extraocular movements intact.      Pupils: Pupils are equal, round, and reactive to light.   Pulmonary:      Effort: Pulmonary effort is normal.      Comments: Tenderness to palpation along the bilateral T5 and T6 ribs  Skin:     General: Skin is warm and dry.      Capillary Refill: Capillary refill takes less than 2 seconds.   Neurological:      General: No focal deficit present.      Mental Status: She is alert and oriented to person, place, and time.      Cranial Nerves: No cranial nerve deficit.      Sensory: No sensory deficit.      Motor: No weakness or abnormal muscle tone.      Gait: Gait abnormal (slight right foot drop).      Deep Tendon Reflexes: Babinski sign absent on the right side. Babinski sign absent on the left side.      Reflex Scores:       Patellar reflexes are 2+ on the right side and 2+ on the left side.       Achilles reflexes are 2+ on the right side and 2+ on the left side.  Psychiatric:         Mood and Affect: Mood normal.         Behavior: Behavior normal.         Thought Content: Thought content normal.           Musculoskeletal:      Lumbar Exam-  Incision: no  Pain with Flexion: yes  Pain with Extension: yes > than flexion;  ROM:  limited due to pain  Paraspinous TTP:  bilateral lumbar paraspinous  Facet TTP:  L5-S1  Facet Loading:  Positive bilaterally  SLR:  Negative bilaterally  SIJ TTP:  positive bilaterally  ALBERTO:  positive bilaterally          LABS:  Lab Results   Component Value Date    WBC 3.77 (L) 05/20/2025    HGB 10.3 (L) 05/20/2025    HCT 32.0 (L) 05/20/2025    MCV 90 05/20/2025      05/20/2025       CMP  Sodium   Date Value Ref Range Status   05/18/2025 140 136 - 145 mmol/L Final   02/24/2025 142 136 - 145 mmol/L Final   02/24/2025 142 136 - 145 mmol/L Final     Potassium   Date Value Ref Range Status   05/18/2025 4.6 3.5 - 5.1 mmol/L Final   02/24/2025 4.7 3.5 - 5.1 mmol/L Final   02/24/2025 4.7 3.5 - 5.1 mmol/L Final     Chloride   Date Value Ref Range Status   05/18/2025 107 95 - 110 mmol/L Final   02/24/2025 105 95 - 110 mmol/L Final   02/24/2025 105 95 - 110 mmol/L Final     CO2   Date Value Ref Range Status   05/18/2025 24 23 - 29 mmol/L Final   02/24/2025 21 (L) 23 - 29 mmol/L Final   02/24/2025 21 (L) 23 - 29 mmol/L Final     Glucose   Date Value Ref Range Status   05/18/2025 77 70 - 110 mg/dL Final   02/24/2025 76 70 - 110 mg/dL Final   02/24/2025 76 70 - 110 mg/dL Final     BUN   Date Value Ref Range Status   05/18/2025 36 (H) 8 - 23 mg/dL Final     Creatinine   Date Value Ref Range Status   05/18/2025 2.8 (H) 0.5 - 1.4 mg/dL Final     Calcium   Date Value Ref Range Status   05/18/2025 8.8 8.7 - 10.5 mg/dL Final   02/24/2025 9.7 8.7 - 10.5 mg/dL Final   02/24/2025 9.7 8.7 - 10.5 mg/dL Final     Protein Total   Date Value Ref Range Status   05/18/2025 7.2 6.0 - 8.4 gm/dL Final     Total Protein   Date Value Ref Range Status   02/24/2025 7.9 6.0 - 8.4 g/dL Final     Albumin   Date Value Ref Range Status   05/18/2025 2.8 (L) 3.5 - 5.2 g/dL Final   02/24/2025 3.3 (L) 3.5 - 5.2 g/dL Final   02/24/2025 3.3 (L) 3.5 - 5.2 g/dL Final     Total Bilirubin   Date Value Ref Range Status   02/24/2025 0.4 0.1 - 1.0 mg/dL Final     Comment:     For infants and newborns, interpretation of results should be based  on gestational age, weight and in agreement with clinical  observations.    Premature Infant recommended reference ranges:  Up to 24 hours.............<8.0 mg/dL  Up to 48 hours............<12.0 mg/dL  3-5 days..................<15.0 mg/dL  6-29 days.................<15.0 mg/dL        Bilirubin Total   Date Value Ref Range Status   05/18/2025 0.4 0.1 - 1.0 mg/dL Final     Comment:     For infants and newborns, interpretation of results should be based   on gestational age, weight and in agreement with clinical   observations.    Premature Infant recommended reference ranges:   0-24 hours:  <8.0 mg/dL   24-48 hours: <12.0 mg/dL   3-5 days:    <15.0 mg/dL   6-29 days:   <15.0 mg/dL     Alkaline Phosphatase   Date Value Ref Range Status   02/24/2025 120 40 - 150 U/L Final     ALP   Date Value Ref Range Status   05/18/2025 165 (H) 40 - 150 unit/L Final     AST   Date Value Ref Range Status   05/18/2025 39 11 - 45 unit/L Final   02/24/2025 42 (H) 10 - 40 U/L Final     ALT   Date Value Ref Range Status   05/18/2025 28 10 - 44 unit/L Final   02/24/2025 28 10 - 44 U/L Final     Anion Gap   Date Value Ref Range Status   05/18/2025 9 8 - 16 mmol/L Final     eGFR if    Date Value Ref Range Status   07/14/2022 19 (A) >60 mL/min/1.73 m^2 Final     eGFR if non    Date Value Ref Range Status   07/14/2022 17 (A) >60 mL/min/1.73 m^2 Final     Comment:     Calculation used to obtain the estimated glomerular filtration  rate (eGFR) is the CKD-EPI equation.          Lab Results   Component Value Date    HGBA1C 5.1 07/02/2024             ASSESSMENT:       69 y.o. year old female with lower back pain, consistent with     1. Lumbar spondylosis  Case Request-RAD/Other Procedure Area: Bilateral L4-5 and L5-S1 RFA      2. Contusion of rib, unspecified laterality, sequela  LIDOcaine (LIDODERM) 5 %    DISCONTINUED: LIDOcaine (LIDODERM) 5 %      3. Thoracic spondylosis        4. Degeneration of intervertebral disc of lumbar region with discogenic back pain                    Lumbar spondylosis  -     Case Request-RAD/Other Procedure Area: Bilateral L4-5 and L5-S1 RFA    Contusion of rib, unspecified laterality, sequela  -     Discontinue: LIDOcaine (LIDODERM) 5 %; Place 1 patch onto the  skin daily as needed (RIb Pain). Place patch over the affected area for up to 12 hours.  Please wait 12 hours before placing a new patch.  Dispense: 30 patch; Refill: 0  -     LIDOcaine (LIDODERM) 5 %; Place 1 patch onto the skin daily as needed (RIb Pain). Place patch over the affected area for up to 12 hours.  Please wait 12 hours before placing a new patch.  Dispense: 30 patch; Refill: 0    Thoracic spondylosis    Degeneration of intervertebral disc of lumbar region with discogenic back pain                       PLAN:   - Interventions:   - schedule patient for bilateral L4-5 and L5-S1 RFA.  Patient has undergone 2 successful left lumbar medial branch blocks on 02/06/2025 and 03/19/2025.  Patient also reports 50% relief for 6 months from prior right-sided RFA on 12/05/2024.  Patient reports pain returned after experiencing 1 round of CPR in the hospital.   - patient was initially scheduled for left-sided RFA but was admitted to the hospital for increased chest pain and changes on EKG.  Patient has subsequently experienced cardiac arrest during admission undergoing 1 round CPR and intubation.  After discharge from hospital, patient was admitted again to the ED for anemia and GRACE that improved with transfusion and fluids.  Patient is currently stable      02/02/2023:  T11-12 interlaminar epidural steroid injection, 100% relief  -12/7/22:  Right L4-5 and L5-S1 radiofrequency ablation, 75% relief  -8/22/22:  Right sacroiliac joint and right L5-S1 facet injection,   Resolution of right lower extremity pain    - Anticoagulation use:   Aspirin 81mg does not need to hold for lumbar MBB    - Medications:   - start Lidoderm patches for chest pain after CPR   - continue pregabalin 50 mg nightly   - continue oxycodone 5 mg twice a day p.r.n.      - Therapy:    Continue formal physical therapy    - Imaging:   CT of thoracic spine reviewed.  Significant for right eccentric disc bulge at T11-12 with foraminal stenosis   X-ray  Lumbar  reviewed. Significant for grade 1 anterolisthesis of L4 upon L5.  Degenerative disc disease at T11-T12.  As well as pseudo joint formation of right L5 transverse process with superior sacrum     - Consults:   Continue follow-up with cardiology for previous heart transplant    - Counseled patient regarding the importance of activity modification and physical therapy    - Patient Questions: Answered all of the patient's questions regarding diagnosis, therapy, and treatment    - Follow up visit:  Return to clinic 5 weeks after procedure    Visit today included increased complexity associated with the care of the episodic problem of chronic pain which was addressed and continue to manage the longitudinal care of the patient due to the serious and/or complex managed problem(s) listed above.        The above plan and management options were discussed at length with patient. Patient is in agreement with the above and verbalized understanding.    I discussed the goals of interventional chronic pain management with the patient on today's visit.  I explained the utility of injections for diagnostic and therapeutic purposes.  We discussed a multimodal approach to pain including treating the patient's given worst pain at any given time.  We will use a systematic approach to addressing pain.  We will also adopt a multimodal approach that includes injections, adjuvant medications, physical therapy, at times psychiatry.  There may be a limited role for opioid use intermittently in the treatment of pain, more particularly for acute pain although no one approach can be used as a sole treatment modality.    I emphasized the importance of regular exercise, core strengthening and stretching, diet and weight loss as a cornerstone of long-term pain management.      Jassi Pierre MD  Interventional Pain Management  [facility]    Disclaimer:  This note was prepared using voice recognition system and is likely to have  sound alike errors that may have been overlooked even after proof reading.  Please call me with any questions            I spent a total of 30 minutes on the day of the visit.  This includes face to face time and non-face to face time preparing to see the patient (eg, review of tests), obtaining and/or reviewing separately obtained history, documenting clinical information in the electronic or other health record, independently interpreting results and communicating results to the patient/family/caregiver, or care coordinator.

## 2025-06-02 ENCOUNTER — LAB REQUISITION (OUTPATIENT)
Dept: LAB | Facility: HOSPITAL | Age: 70
End: 2025-06-02
Payer: MEDICARE

## 2025-06-02 DIAGNOSIS — I25.700 ATHEROSCLEROSIS OF CORONARY ARTERY BYPASS GRAFT(S), UNSPECIFIED, WITH UNSTABLE ANGINA PECTORIS: ICD-10-CM

## 2025-06-02 DIAGNOSIS — I25.758 ATHEROSCLEROSIS OF NATIVE CORONARY ARTERY OF TRANSPLANTED HEART WITH OTHER FORMS OF ANGINA PECTORIS: ICD-10-CM

## 2025-06-02 DIAGNOSIS — I50.30 UNSPECIFIED DIASTOLIC (CONGESTIVE) HEART FAILURE: ICD-10-CM

## 2025-06-02 LAB
ANION GAP (OHS): 12 MMOL/L (ref 8–16)
BUN SERPL-MCNC: 42 MG/DL (ref 8–23)
CALCIUM SERPL-MCNC: 9.1 MG/DL (ref 8.7–10.5)
CHLORIDE SERPL-SCNC: 105 MMOL/L (ref 95–110)
CO2 SERPL-SCNC: 21 MMOL/L (ref 23–29)
CREAT SERPL-MCNC: 3.2 MG/DL (ref 0.5–1.4)
ERYTHROCYTE [DISTWIDTH] IN BLOOD BY AUTOMATED COUNT: 16.6 % (ref 11.5–14.5)
GFR SERPLBLD CREATININE-BSD FMLA CKD-EPI: 15 ML/MIN/1.73/M2
GLUCOSE SERPL-MCNC: 96 MG/DL (ref 70–110)
HCT VFR BLD AUTO: 24 % (ref 37–48.5)
HGB BLD-MCNC: 7.9 GM/DL (ref 12–16)
MAGNESIUM SERPL-MCNC: 2 MG/DL (ref 1.6–2.6)
MCH RBC QN AUTO: 28.8 PG (ref 27–31)
MCHC RBC AUTO-ENTMCNC: 32.9 G/DL (ref 32–36)
MCV RBC AUTO: 88 FL (ref 82–98)
PLATELET # BLD AUTO: 238 K/UL (ref 150–450)
PMV BLD AUTO: 10.2 FL (ref 9.2–12.9)
POTASSIUM SERPL-SCNC: 4.4 MMOL/L (ref 3.5–5.1)
RBC # BLD AUTO: 2.74 M/UL (ref 4–5.4)
SODIUM SERPL-SCNC: 138 MMOL/L (ref 136–145)
WBC # BLD AUTO: 4.98 K/UL (ref 3.9–12.7)

## 2025-06-02 PROCEDURE — 80048 BASIC METABOLIC PNL TOTAL CA: CPT | Mod: HCNC | Performed by: INTERNAL MEDICINE

## 2025-06-02 PROCEDURE — 85027 COMPLETE CBC AUTOMATED: CPT | Mod: HCNC | Performed by: INTERNAL MEDICINE

## 2025-06-02 PROCEDURE — 83735 ASSAY OF MAGNESIUM: CPT | Mod: HCNC | Performed by: INTERNAL MEDICINE

## 2025-06-04 ENCOUNTER — OFFICE VISIT (OUTPATIENT)
Dept: PRIMARY CARE CLINIC | Facility: CLINIC | Age: 70
End: 2025-06-04
Payer: MEDICARE

## 2025-06-04 VITALS
OXYGEN SATURATION: 97 % | HEIGHT: 62 IN | WEIGHT: 139.75 LBS | BODY MASS INDEX: 25.72 KG/M2 | TEMPERATURE: 98 F | HEART RATE: 106 BPM | DIASTOLIC BLOOD PRESSURE: 76 MMHG | SYSTOLIC BLOOD PRESSURE: 130 MMHG

## 2025-06-04 DIAGNOSIS — N18.4 CKD (CHRONIC KIDNEY DISEASE), STAGE IV: Chronic | ICD-10-CM

## 2025-06-04 DIAGNOSIS — Z12.31 ENCOUNTER FOR SCREENING MAMMOGRAM FOR MALIGNANT NEOPLASM OF BREAST: ICD-10-CM

## 2025-06-04 DIAGNOSIS — M85.88 OSTEOPENIA OF LUMBAR SPINE: Chronic | ICD-10-CM

## 2025-06-04 DIAGNOSIS — I25.758 CORONARY ARTERY DISEASE OF NATIVE ARTERY OF TRANSPLANTED HEART WITH STABLE ANGINA PECTORIS: Chronic | ICD-10-CM

## 2025-06-04 DIAGNOSIS — D64.9 NORMOCYTIC ANEMIA: Chronic | ICD-10-CM

## 2025-06-04 DIAGNOSIS — E78.49 OTHER HYPERLIPIDEMIA: ICD-10-CM

## 2025-06-04 DIAGNOSIS — R79.89 ABNORMAL SERUM THYROID STIMULATING HORMONE (TSH) LEVEL: Primary | ICD-10-CM

## 2025-06-04 DIAGNOSIS — R53.82 CHRONIC FATIGUE: ICD-10-CM

## 2025-06-04 DIAGNOSIS — R68.89 COLD INTOLERANCE: ICD-10-CM

## 2025-06-04 DIAGNOSIS — I70.0 AORTIC ATHEROSCLEROSIS: Chronic | ICD-10-CM

## 2025-06-04 PROCEDURE — 1111F DSCHRG MED/CURRENT MED MERGE: CPT | Mod: CPTII,HCNC,S$GLB, | Performed by: INTERNAL MEDICINE

## 2025-06-04 PROCEDURE — 3066F NEPHROPATHY DOC TX: CPT | Mod: CPTII,HCNC,S$GLB, | Performed by: INTERNAL MEDICINE

## 2025-06-04 PROCEDURE — 1160F RVW MEDS BY RX/DR IN RCRD: CPT | Mod: CPTII,HCNC,S$GLB, | Performed by: INTERNAL MEDICINE

## 2025-06-04 PROCEDURE — 99999 PR PBB SHADOW E&M-EST. PATIENT-LVL V: CPT | Mod: PBBFAC,HCNC,, | Performed by: INTERNAL MEDICINE

## 2025-06-04 PROCEDURE — 3008F BODY MASS INDEX DOCD: CPT | Mod: CPTII,HCNC,S$GLB, | Performed by: INTERNAL MEDICINE

## 2025-06-04 PROCEDURE — 3078F DIAST BP <80 MM HG: CPT | Mod: CPTII,HCNC,S$GLB, | Performed by: INTERNAL MEDICINE

## 2025-06-04 PROCEDURE — 3075F SYST BP GE 130 - 139MM HG: CPT | Mod: CPTII,HCNC,S$GLB, | Performed by: INTERNAL MEDICINE

## 2025-06-04 PROCEDURE — 1159F MED LIST DOCD IN RCRD: CPT | Mod: CPTII,HCNC,S$GLB, | Performed by: INTERNAL MEDICINE

## 2025-06-04 PROCEDURE — 1157F ADVNC CARE PLAN IN RCRD: CPT | Mod: CPTII,HCNC,S$GLB, | Performed by: INTERNAL MEDICINE

## 2025-06-04 PROCEDURE — 99417 PROLNG OP E/M EACH 15 MIN: CPT | Mod: HCNC,S$GLB,, | Performed by: INTERNAL MEDICINE

## 2025-06-04 PROCEDURE — 1101F PT FALLS ASSESS-DOCD LE1/YR: CPT | Mod: CPTII,HCNC,S$GLB, | Performed by: INTERNAL MEDICINE

## 2025-06-04 PROCEDURE — 1125F AMNT PAIN NOTED PAIN PRSNT: CPT | Mod: CPTII,HCNC,S$GLB, | Performed by: INTERNAL MEDICINE

## 2025-06-04 PROCEDURE — 3288F FALL RISK ASSESSMENT DOCD: CPT | Mod: CPTII,HCNC,S$GLB, | Performed by: INTERNAL MEDICINE

## 2025-06-04 PROCEDURE — 99215 OFFICE O/P EST HI 40 MIN: CPT | Mod: HCNC,S$GLB,, | Performed by: INTERNAL MEDICINE

## 2025-06-04 RX ORDER — ROSUVASTATIN CALCIUM 5 MG/1
5 TABLET, COATED ORAL DAILY
Qty: 30 TABLET | Refills: 5 | Status: SHIPPED | OUTPATIENT
Start: 2025-06-04 | End: 2025-12-01

## 2025-06-05 PROBLEM — Z71.89 ACP (ADVANCE CARE PLANNING): Status: RESOLVED | Noted: 2025-04-30 | Resolved: 2025-06-05

## 2025-06-05 PROBLEM — Z51.5 ENCOUNTER FOR PALLIATIVE CARE: Status: RESOLVED | Noted: 2024-10-14 | Resolved: 2025-06-05

## 2025-06-05 PROBLEM — R79.89 ELEVATED TROPONIN: Status: RESOLVED | Noted: 2025-05-13 | Resolved: 2025-06-05

## 2025-06-05 PROBLEM — E78.49 OTHER HYPERLIPIDEMIA: Chronic | Status: ACTIVE | Noted: 2022-06-21

## 2025-06-06 ENCOUNTER — TELEPHONE (OUTPATIENT)
Dept: PRIMARY CARE CLINIC | Facility: CLINIC | Age: 70
End: 2025-06-06
Payer: MEDICARE

## 2025-06-09 ENCOUNTER — LAB REQUISITION (OUTPATIENT)
Dept: LAB | Facility: HOSPITAL | Age: 70
End: 2025-06-09
Payer: MEDICARE

## 2025-06-09 ENCOUNTER — TELEPHONE (OUTPATIENT)
Dept: TRANSPLANT | Facility: CLINIC | Age: 70
End: 2025-06-09
Payer: MEDICARE

## 2025-06-09 ENCOUNTER — OFFICE VISIT (OUTPATIENT)
Dept: OPHTHALMOLOGY | Facility: CLINIC | Age: 70
End: 2025-06-09
Payer: MEDICARE

## 2025-06-09 DIAGNOSIS — H52.7 REFRACTIVE ERRORS: ICD-10-CM

## 2025-06-09 DIAGNOSIS — I25.758 ATHEROSCLEROSIS OF NATIVE CORONARY ARTERY OF TRANSPLANTED HEART WITH OTHER FORMS OF ANGINA PECTORIS: ICD-10-CM

## 2025-06-09 DIAGNOSIS — H25.13 CATARACT, NUCLEAR SCLEROTIC SENILE, BILATERAL: Primary | ICD-10-CM

## 2025-06-09 LAB
ABSOLUTE EOSINOPHIL (OHS): 0.06 K/UL
ABSOLUTE MONOCYTE (OHS): 0.38 K/UL (ref 0.3–1)
ABSOLUTE NEUTROPHIL COUNT (OHS): 2.01 K/UL (ref 1.8–7.7)
ANION GAP (OHS): 13 MMOL/L (ref 8–16)
BASOPHILS # BLD AUTO: 0 K/UL
BASOPHILS NFR BLD AUTO: 0 %
BUN SERPL-MCNC: 46 MG/DL (ref 8–23)
CALCIUM SERPL-MCNC: 9.3 MG/DL (ref 8.7–10.5)
CHLORIDE SERPL-SCNC: 106 MMOL/L (ref 95–110)
CO2 SERPL-SCNC: 23 MMOL/L (ref 23–29)
CREAT SERPL-MCNC: 3.4 MG/DL (ref 0.5–1.4)
ERYTHROCYTE [DISTWIDTH] IN BLOOD BY AUTOMATED COUNT: 16.6 % (ref 11.5–14.5)
GFR SERPLBLD CREATININE-BSD FMLA CKD-EPI: 14 ML/MIN/1.73/M2
GLUCOSE SERPL-MCNC: 76 MG/DL (ref 70–110)
HCT VFR BLD AUTO: 30.1 % (ref 37–48.5)
HGB BLD-MCNC: 9.4 GM/DL (ref 12–16)
IMM GRANULOCYTES # BLD AUTO: 0 K/UL (ref 0–0.04)
IMM GRANULOCYTES NFR BLD AUTO: 0 % (ref 0–0.5)
LYMPHOCYTES # BLD AUTO: 0.83 K/UL (ref 1–4.8)
MCH RBC QN AUTO: 28.6 PG (ref 27–31)
MCHC RBC AUTO-ENTMCNC: 31.2 G/DL (ref 32–36)
MCV RBC AUTO: 92 FL (ref 82–98)
NUCLEATED RBC (/100WBC) (OHS): 0 /100 WBC
PLATELET # BLD AUTO: 193 K/UL (ref 150–450)
PMV BLD AUTO: 9.9 FL (ref 9.2–12.9)
POTASSIUM SERPL-SCNC: 4.7 MMOL/L (ref 3.5–5.1)
RBC # BLD AUTO: 3.29 M/UL (ref 4–5.4)
RELATIVE EOSINOPHIL (OHS): 1.8 %
RELATIVE LYMPHOCYTE (OHS): 25.3 % (ref 18–48)
RELATIVE MONOCYTE (OHS): 11.6 % (ref 4–15)
RELATIVE NEUTROPHIL (OHS): 61.3 % (ref 38–73)
SODIUM SERPL-SCNC: 142 MMOL/L (ref 136–145)
WBC # BLD AUTO: 3.28 K/UL (ref 3.9–12.7)

## 2025-06-09 PROCEDURE — 3066F NEPHROPATHY DOC TX: CPT | Mod: CPTII,HCNC,S$GLB, | Performed by: OPTOMETRIST

## 2025-06-09 PROCEDURE — 1159F MED LIST DOCD IN RCRD: CPT | Mod: CPTII,HCNC,S$GLB, | Performed by: OPTOMETRIST

## 2025-06-09 PROCEDURE — 92015 DETERMINE REFRACTIVE STATE: CPT | Mod: HCNC,S$GLB,, | Performed by: OPTOMETRIST

## 2025-06-09 PROCEDURE — 1157F ADVNC CARE PLAN IN RCRD: CPT | Mod: CPTII,HCNC,S$GLB, | Performed by: OPTOMETRIST

## 2025-06-09 PROCEDURE — 80048 BASIC METABOLIC PNL TOTAL CA: CPT | Mod: HCNC,PO | Performed by: INTERNAL MEDICINE

## 2025-06-09 PROCEDURE — 99999 PR PBB SHADOW E&M-EST. PATIENT-LVL III: CPT | Mod: PBBFAC,HCNC,, | Performed by: OPTOMETRIST

## 2025-06-09 PROCEDURE — 92014 COMPRE OPH EXAM EST PT 1/>: CPT | Mod: HCNC,S$GLB,, | Performed by: OPTOMETRIST

## 2025-06-09 PROCEDURE — 85025 COMPLETE CBC W/AUTO DIFF WBC: CPT | Mod: HCNC,PO | Performed by: INTERNAL MEDICINE

## 2025-06-09 NOTE — TELEPHONE ENCOUNTER
5/9/2025 HH from the Hospital stay needs a partial Peer to peer   By 10 am can be done and 8 am in the morning peer to peer for    Needed from her discharge from the Hospital.

## 2025-06-09 NOTE — PROGRESS NOTES
SUBJECTIVE  Nadia Damon is 69 y.o. female  Corrected distance visual acuity was 20/30 in the right eye and 20/30 -2 in the left eye.   Chief Complaint   Patient presents with    Annual Exam     Complete ocular exam    Patient states VA has decreased drastically at all ranges in OU over the last 2 years, needs a new RX.          HPI     Annual Exam     Additional comments: Complete ocular exam    Patient states VA has decreased drastically at all ranges in OU over the   last 2 years, needs a new RX.           Comments    1. Cataracts OU  2. Refractive error             Last edited by Vanna Pryor, Patient Care Assistant on 6/9/2025  1:36   PM.         Assessment /Plan :  1. Cataract, nuclear sclerotic senile, bilateral  Significant cataracts OU, discussed cataract surgery including Specialty IOL's    Consult Ophthalmology for cataract evaluation at next available appointment.  Referred to Dr. Marx for Cataract Eval.  2. Refractive errors  RTC PRN  Discussed above and answered questions.

## 2025-06-10 ENCOUNTER — PATIENT MESSAGE (OUTPATIENT)
Dept: TRANSPLANT | Facility: CLINIC | Age: 70
End: 2025-06-10
Payer: MEDICARE

## 2025-06-10 ENCOUNTER — HOSPITAL ENCOUNTER (OUTPATIENT)
Dept: RADIOLOGY | Facility: HOSPITAL | Age: 70
Discharge: HOME | End: 2025-06-10
Attending: INTERNAL MEDICINE
Payer: MEDICARE

## 2025-06-10 DIAGNOSIS — D05.12 DUCTAL CARCINOMA IN SITU (DCIS) OF LEFT BREAST: ICD-10-CM

## 2025-06-10 DIAGNOSIS — Z12.31 ENCOUNTER FOR SCREENING MAMMOGRAM FOR MALIGNANT NEOPLASM OF BREAST: ICD-10-CM

## 2025-06-10 DIAGNOSIS — C77.3 SECONDARY AND UNSPECIFIED MALIGNANT NEOPLASM OF AXILLA AND UPPER LIMB LYMPH NODES: ICD-10-CM

## 2025-06-10 DIAGNOSIS — Z85.3 ENCOUNTER FOR FOLLOW-UP SURVEILLANCE OF BREAST CANCER: ICD-10-CM

## 2025-06-10 DIAGNOSIS — Z08 ENCOUNTER FOR FOLLOW-UP SURVEILLANCE OF BREAST CANCER: ICD-10-CM

## 2025-06-10 PROCEDURE — 77062 BREAST TOMOSYNTHESIS BI: CPT | Mod: 26,HCNC,, | Performed by: RADIOLOGY

## 2025-06-10 PROCEDURE — 77066 DX MAMMO INCL CAD BI: CPT | Mod: 26,HCNC,, | Performed by: RADIOLOGY

## 2025-06-10 PROCEDURE — 77066 DX MAMMO INCL CAD BI: CPT | Mod: TC,HCNC

## 2025-06-13 DIAGNOSIS — R79.89 ABNORMAL THYROID BLOOD TEST: Primary | Chronic | ICD-10-CM

## 2025-06-16 ENCOUNTER — RESULTS FOLLOW-UP (OUTPATIENT)
Dept: PRIMARY CARE CLINIC | Facility: CLINIC | Age: 70
End: 2025-06-16

## 2025-06-16 ENCOUNTER — LAB VISIT (OUTPATIENT)
Dept: LAB | Facility: HOSPITAL | Age: 70
End: 2025-06-16
Attending: NURSE PRACTITIONER
Payer: MEDICARE

## 2025-06-16 DIAGNOSIS — R53.82 CHRONIC FATIGUE: ICD-10-CM

## 2025-06-16 DIAGNOSIS — R68.89 COLD INTOLERANCE: ICD-10-CM

## 2025-06-16 DIAGNOSIS — N18.5 ANEMIA ASSOCIATED WITH STAGE 5 CHRONIC RENAL FAILURE: ICD-10-CM

## 2025-06-16 DIAGNOSIS — D63.1 ANEMIA ASSOCIATED WITH STAGE 5 CHRONIC RENAL FAILURE: ICD-10-CM

## 2025-06-16 DIAGNOSIS — D05.12 DUCTAL CARCINOMA IN SITU (DCIS) OF LEFT BREAST: ICD-10-CM

## 2025-06-16 DIAGNOSIS — R79.89 ABNORMAL SERUM THYROID STIMULATING HORMONE (TSH) LEVEL: ICD-10-CM

## 2025-06-16 LAB
ABSOLUTE EOSINOPHIL (OHS): 0.15 K/UL
ABSOLUTE MONOCYTE (OHS): 0.47 K/UL (ref 0.3–1)
ABSOLUTE NEUTROPHIL COUNT (OHS): 3.14 K/UL (ref 1.8–7.7)
BASOPHILS # BLD AUTO: 0.01 K/UL
BASOPHILS NFR BLD AUTO: 0.2 %
ERYTHROCYTE [DISTWIDTH] IN BLOOD BY AUTOMATED COUNT: 16.1 % (ref 11.5–14.5)
FERRITIN SERPL-MCNC: 116 NG/ML (ref 20–300)
HCT VFR BLD AUTO: 30.9 % (ref 37–48.5)
HGB BLD-MCNC: 10 GM/DL (ref 12–16)
IMM GRANULOCYTES # BLD AUTO: 0.03 K/UL (ref 0–0.04)
IMM GRANULOCYTES NFR BLD AUTO: 0.6 % (ref 0–0.5)
IRON SATN MFR SERPL: 21 % (ref 20–50)
IRON SERPL-MCNC: 74 UG/DL (ref 30–160)
LYMPHOCYTES # BLD AUTO: 0.96 K/UL (ref 1–4.8)
MCH RBC QN AUTO: 29 PG (ref 27–31)
MCHC RBC AUTO-ENTMCNC: 32.4 G/DL (ref 32–36)
MCV RBC AUTO: 90 FL (ref 82–98)
NUCLEATED RBC (/100WBC) (OHS): 0 /100 WBC
PLATELET # BLD AUTO: 225 K/UL (ref 150–450)
PMV BLD AUTO: 9 FL (ref 9.2–12.9)
RBC # BLD AUTO: 3.45 M/UL (ref 4–5.4)
RELATIVE EOSINOPHIL (OHS): 3.2 %
RELATIVE LYMPHOCYTE (OHS): 20.2 % (ref 18–48)
RELATIVE MONOCYTE (OHS): 9.9 % (ref 4–15)
RELATIVE NEUTROPHIL (OHS): 65.9 % (ref 38–73)
T3FREE SERPL-MCNC: 75 NG/DL (ref 60–180)
T4 FREE SERPL-MCNC: 0.93 NG/DL (ref 0.71–1.51)
TIBC SERPL-MCNC: 360 UG/DL (ref 250–450)
TRANSFERRIN SERPL-MCNC: 243 MG/DL (ref 200–375)
TSH SERPL-ACNC: 17.19 UIU/ML (ref 0.4–4)
WBC # BLD AUTO: 4.76 K/UL (ref 3.9–12.7)

## 2025-06-16 PROCEDURE — 82728 ASSAY OF FERRITIN: CPT | Mod: HCNC

## 2025-06-16 PROCEDURE — 36415 COLL VENOUS BLD VENIPUNCTURE: CPT | Mod: HCNC

## 2025-06-16 PROCEDURE — 84439 ASSAY OF FREE THYROXINE: CPT | Mod: HCNC

## 2025-06-16 PROCEDURE — 84443 ASSAY THYROID STIM HORMONE: CPT | Mod: HCNC

## 2025-06-16 PROCEDURE — 85025 COMPLETE CBC W/AUTO DIFF WBC: CPT | Mod: HCNC

## 2025-06-16 PROCEDURE — 84480 ASSAY TRIIODOTHYRONINE (T3): CPT | Mod: HCNC

## 2025-06-16 PROCEDURE — 83540 ASSAY OF IRON: CPT | Mod: HCNC
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
## 2025-06-17 ENCOUNTER — TELEPHONE (OUTPATIENT)
Dept: PRIMARY CARE CLINIC | Facility: CLINIC | Age: 70
End: 2025-06-17
Payer: MEDICARE

## 2025-06-17 ENCOUNTER — CLINICAL SUPPORT (OUTPATIENT)
Dept: PRIMARY CARE CLINIC | Facility: CLINIC | Age: 70
End: 2025-06-17
Payer: MEDICARE

## 2025-06-17 DIAGNOSIS — F41.8 DEPRESSION WITH ANXIETY: Primary | ICD-10-CM

## 2025-06-17 NOTE — TELEPHONE ENCOUNTER
Spoke with pt and gave lab results and instructions.       ----- Message from Elis Wick MD sent at 6/16/2025  6:39 PM CDT -----  Pt has [facility] - if message below not viewed please call w information below:   Good day Ms. Damon    TSH remains elevated though T3 and freeT4 are normal.  Double checking: you are not taking biotin or if you are, held it for at least 3 days prior to this labwork?  If so, I recommend adding low dose levothyroxine 25 mcg daily, to see if we can bring this TSH level back down under 10 at least.  If you prefer, I can send an e-consult to Endocrinology first.  No charge on our end to send e-consult but there may be a co-pay charge from the specialist consulted.    Please message or call with any questions or concerns!  Thank you!  Elis Wick MD, MPH  [facility]   ----- Message -----  From: Lab, Background User  Sent: 6/16/2025   9:49 AM CDT  To: Elis Wick MD

## 2025-06-17 NOTE — PRE-PROCEDURE INSTRUCTIONS
Spoke with patient regarding procedure scheduled on 6.23     Arrival time 1000     Has patient been sick with fever or on antibiotics within the last 7 days? No     Does the patient have any open wounds, sores or rashes? No     Does the patient have any recent fractures? no     Has patient received a vaccination within the last 7 days? No     Received the COVID vaccination?      Has the patient stopped all medications as directed? na     Does patient have a pacemaker, defibrillator, or implantable stimulator? NONE, pt states all removed     Does the patient have a ride to and from procedure and someone reliable to remain with patient? ashely     Is the patient diabetic? no     Does the patient have sleep apnea? Or use O2 at home? no     Is the patient receiving sedation?      Is the patient instructed to remain NPO beginning at midnight the night before their procedure? yes     Procedure location confirmed with patient? Yes     Covid- Denies signs/symptoms. Instructed to notify PAT/MD if any changes.

## 2025-06-18 ENCOUNTER — E-CONSULT (OUTPATIENT)
Dept: ENDOCRINOLOGY | Facility: CLINIC | Age: 70
End: 2025-06-18
Payer: MEDICARE

## 2025-06-18 DIAGNOSIS — E03.9 HYPOTHYROIDISM, UNSPECIFIED TYPE: Primary | ICD-10-CM

## 2025-06-18 NOTE — CONSULTS
[facility]  Response for E-Consult     Patient Name: Nadia Damon  MRN: [id number]  Primary Care Provider: Elis Wick MD   Requesting Provider: Elis Wick MD  E-Consult to Endocrinology  Consult performed by: Jose Ramon Rader DO  Consult ordered by: Elis Wick MD  Reason for consult: Hypothyroid  Assessment/Recommendations: See note      I have reviewed the chart and thyroid labs.    I note the gradual rise in TSH over the past year, now reaching 17.2, with free T4 and total T3 remaining in the normal range. This pattern is consistent with progressive primary hypothyroidism.    Given that TSH has been persistently >10, treatment with levothyroxine is recommended, even in the absence of symptoms, to reduce long-term cardiovascular and metabolic risks.    With a weight of 63 kg, a starting dose of 50 mcg daily is reasonable, as full weight-based dosing is typically unnecessary in older adults. The goal would be to target TSH to the upper half of the normal range or even mildly elevated (e.g., 2-6), balancing adequate thyroid replacement with age-related sensitivity to overtreatment.    Repeat TSH in 6-8 weeks after starting therapy, and adjust dose gradually as needed.     Total time of Consultation: 10 minute    I did not speak to the requesting provider verbally about this.     *This eConsult is based on the clinical data available to me and is furnished without benefit of a physical examination. The eConsult will need to be interpreted in light of any clinical issues or changes in patient status not available to me at the time of filing this eConsults. Significant changes in patient condition or level of acuity should result in immediate formal consultation and reevaluation. Please alert me if you have further questions.    Thank you for this eConsult referral.     DO [facility]

## 2025-06-19 DIAGNOSIS — E03.9 ACQUIRED HYPOTHYROIDISM: Primary | ICD-10-CM

## 2025-06-19 RX ORDER — LEVOTHYROXINE SODIUM 25 UG/1
25 TABLET ORAL
Qty: 60 TABLET | Refills: 0 | Status: SHIPPED | OUTPATIENT
Start: 2025-06-19 | End: 2025-08-18

## 2025-06-23 ENCOUNTER — HOSPITAL ENCOUNTER (OUTPATIENT)
Facility: HOSPITAL | Age: 70
Discharge: HOME | End: 2025-06-23
Attending: ANESTHESIOLOGY | Admitting: ANESTHESIOLOGY
Payer: MEDICARE

## 2025-06-23 VITALS
BODY MASS INDEX: 25.6 KG/M2 | HEIGHT: 62 IN | OXYGEN SATURATION: 98 % | HEART RATE: 90 BPM | WEIGHT: 139.13 LBS | RESPIRATION RATE: 16 BRPM | TEMPERATURE: 98 F | DIASTOLIC BLOOD PRESSURE: 78 MMHG | SYSTOLIC BLOOD PRESSURE: 144 MMHG

## 2025-06-23 DIAGNOSIS — M47.816 LUMBAR SPONDYLOSIS: Primary | ICD-10-CM

## 2025-06-23 PROCEDURE — 64636 DESTROY L/S FACET JNT ADDL: CPT | Mod: 50,HCNC | Performed by: ANESTHESIOLOGY

## 2025-06-23 PROCEDURE — 99152 MOD SED SAME PHYS/QHP 5/>YRS: CPT | Mod: HCNC | Performed by: ANESTHESIOLOGY

## 2025-06-23 PROCEDURE — 64636 DESTROY L/S FACET JNT ADDL: CPT | Mod: 50,HCNC,, | Performed by: ANESTHESIOLOGY

## 2025-06-23 PROCEDURE — 64635 DESTROY LUMB/SAC FACET JNT: CPT | Mod: 50,HCNC | Performed by: ANESTHESIOLOGY

## 2025-06-23 PROCEDURE — 64635 DESTROY LUMB/SAC FACET JNT: CPT | Mod: 50,HCNC,, | Performed by: ANESTHESIOLOGY

## 2025-06-23 PROCEDURE — 63600175 PHARM REV CODE 636 W HCPCS: Mod: HCNC | Performed by: ANESTHESIOLOGY

## 2025-06-23 RX ORDER — MIDAZOLAM HYDROCHLORIDE 1 MG/ML
INJECTION INTRAMUSCULAR; INTRAVENOUS
Status: DISCONTINUED | OUTPATIENT
Start: 2025-06-23 | End: 2025-06-23 | Stop reason: HOSPADM

## 2025-06-23 RX ORDER — ONDANSETRON HYDROCHLORIDE 2 MG/ML
4 INJECTION, SOLUTION INTRAVENOUS ONCE AS NEEDED
Status: DISCONTINUED | OUTPATIENT
Start: 2025-06-23 | End: 2025-06-23 | Stop reason: HOSPADM

## 2025-06-23 RX ORDER — BUPIVACAINE HYDROCHLORIDE 2.5 MG/ML
INJECTION, SOLUTION EPIDURAL; INFILTRATION; INTRACAUDAL; PERINEURAL
Status: DISCONTINUED | OUTPATIENT
Start: 2025-06-23 | End: 2025-06-23 | Stop reason: HOSPADM

## 2025-06-23 RX ORDER — LIDOCAINE HYDROCHLORIDE 10 MG/ML
INJECTION, SOLUTION EPIDURAL; INFILTRATION; INTRACAUDAL; PERINEURAL
Status: DISCONTINUED | OUTPATIENT
Start: 2025-06-23 | End: 2025-06-23 | Stop reason: HOSPADM

## 2025-06-23 RX ORDER — FENTANYL CITRATE 50 UG/ML
INJECTION, SOLUTION INTRAMUSCULAR; INTRAVENOUS
Status: DISCONTINUED | OUTPATIENT
Start: 2025-06-23 | End: 2025-06-23 | Stop reason: HOSPADM

## 2025-06-23 RX ORDER — METHYLPREDNISOLONE ACETATE 40 MG/ML
INJECTION, SUSPENSION INTRA-ARTICULAR; INTRALESIONAL; INTRAMUSCULAR; SOFT TISSUE
Status: DISCONTINUED | OUTPATIENT
Start: 2025-06-23 | End: 2025-06-23 | Stop reason: HOSPADM

## 2025-06-23 NOTE — OP NOTE
Lumbar Medial nerve branch block radiofrequency ablation Under Fluoroscopy  Bilateral L4/5 and L5/S1    INFORMED CONSENT: The procedure, risks, benefits and options were discussed with patient. There are no contraindications to the procedure. The patient expressed understanding and agreed to proceed. The personnel performing the procedure was discussed.    Date of procedure 06/23/2025    Time-out taken to identify patient and procedure side prior to starting the procedure.                     PROCEDURE: Bilateral radiofrequency ablation of the the medial branch nerves at the   transverse process of  L4, L5, and the sacral ala    Pre Procedure diagnosis:    Lumbar spondylosis [M47.816]  1. Lumbar spondylosis        Post-Procedure diagnosis:   same      PHYSICIAN: Jassi Pierre MD    ASSISTANTS: None     LOCAL ANESTHETIC USED: 1ml of Lidocaine 1%, at each level    Sedation: Conscious sedation provided by M.D    SEDATION MEDICATIONS: local/IV sedation: Versed 2 mg and fentanyl 100 mcg IV.  Conscious sedation ordered by MD.  Patient reevaluated and sedation administered by MD and monitored by RN.  Total sedation time was less than 10 min.    Total sedation time was <10 min    ESTIMATED BLOOD LOSS: None.     COMPLICATIONS: None.       TECHNIQUE: Laying in a prone position, the patient was prepped and draped in the usual sterile fashion using ChloraPrep and fenestrated drape. The level was determined under fluoroscopic guidance. Local anesthetic was given by going down to the hub of the 27-gauge 1.25in needle and raising a wheel. A 18-gauge 10mm curved active tip needle was introduced to the anatomic local of the medial branch at each of the above levels using fluoroscopy. Then sensory and motor testing was performed to confirm that the needle tips were in the correct location. Then after negative aspiration, 1ml of lidocaine PF 1% was injected at each level. This was followed by thermal lesioning at 80 degrees  celsius for 90 seconds. After lesioning, 0.5ml of bupivacaine PF 0.25% and 5mg of DepoMedrol was injected at each level.    The patient tolerated the procedure well. Did not have any procedure related motor deficit at the conclusion of the procedure      The patient was monitored for approximately 30 minutes after the procedure.  Patient was given post procedure and discharge instructions to follow at home.  The patient was discharged in a stable condition

## 2025-06-23 NOTE — DISCHARGE SUMMARY
Discharge Note  Short Stay      SUMMARY     Admit Date: 6/23/2025    Attending Physician: Jassi Pierre MD        Discharge Physician: Jassi Pierre MD        Discharge Date: 6/23/2025 11:33 AM    Procedure(s) (LRB):  Bilateral L4-5 and L5-S1 RFA (Bilateral)    Final Diagnosis: Lumbar spondylosis [M47.816]    Disposition: Home or self care    Patient Instructions:   Current Discharge Medication List        CONTINUE these medications which have NOT CHANGED    Details   aspirin (ECOTRIN) 81 MG EC tablet Take 1 tablet (81 mg total) by mouth once daily.      carvediloL (COREG) 3.125 MG tablet TAKE 1 TABLET TWICE DAILY WITH MEALS  Qty: 180 tablet, Refills: 3    Comments: .      NIFEdipine (PROCARDIA-XL) 60 MG (OSM) 24 hr tablet Take 1 tablet (60 mg total) by mouth 2 (two) times a day.  Qty: 60 tablet, Refills: 11    Comments: .  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  Qty: 30 capsule, Refills: 11      cycloSPORINE (NEORAL) 100 mg/mL microemulsion solution Take 0.5 mLs (50 mg total) by mouth every 12 (twelve) hours.  Qty: 50 mL, Refills: 12    Comments: Txp Date:5/27/1993 (Heart) Disch Date:  ICD10:Z94.1 Txp Location:[facility]      furosemide (LASIX) 40 MG tablet Take ½ tablet (20 mg total) by mouth once daily.  Qty: 30 tablet, Refills: 11    Associated Diagnoses: Chronic diastolic (congestive) heart failure      isosorbide mononitrate (IMDUR) 60 MG 24 hr tablet Take 1 tablet (60 mg total) by mouth once daily.  Qty: 30 tablet, Refills: 11    Associated Diagnoses: Primary hypertension; Other chest pain       levothyroxine (SYNTHROID) 25 MCG tablet Take 1 tablet (25 mcg total) by mouth before breakfast.  Qty: 60 tablet, Refills: 0      LIDOcaine (LIDODERM) 5 % Place 1 patch onto the skin daily as needed (RIb Pain). Place patch over the affected area for up to 12 hours.  Please wait 12 hours before placing a new patch.  Qty: 30 patch, Refills: 0    Associated Diagnoses: Contusion of rib, unspecified laterality, sequela      ondansetron (ZOFRAN) 4 MG tablet TAKE 1 TABLET EVERY 12 HOURS AS NEEDED FOR NAUSEA  Qty: 16 tablet, Refills: 0    Associated Diagnoses: Nausea and vomiting, unspecified vomiting type      pantoprazole (PROTONIX) 40 MG tablet Take 1 tablet (40 mg total) by mouth once daily.  Qty: 90 tablet, Refills: 3      polyethylene glycol (GLYCOLAX) 17 gram PwPk Take 17 g by mouth once daily.      pregabalin (LYRICA) 50 MG capsule Take 1 capsule (50 mg total) by mouth every evening.  Qty: 30 capsule, Refills: 6    Associated Diagnoses: Intercostal pain      rosuvastatin (CRESTOR) 5 MG tablet Take 1 tablet (5 mg total) by mouth once daily.  Qty: 30 tablet, Refills: 5      sertraline (ZOLOFT) 25 MG tablet Take 1 tablet (25 mg total) by mouth once daily.  Qty: 30 tablet, Refills: 11      sodium bicarbonate 650 MG tablet Take 1 tablet (650 mg total) by mouth 2 (two) times daily.  Qty: 60 tablet, Refills: 1      temazepam (RESTORIL) 30 mg capsule Take 1 capsule (30 mg total) by mouth nightly as needed for Insomnia.  Qty: 30 capsule, Refills: 1    Associated Diagnoses: Primary insomnia      vitamin E 400 UNIT capsule Take 400 Units by mouth once daily.                 Discharge Diagnosis: Lumbar spondylosis [M47.816]  Condition on Discharge: Stable with no complications to procedure   Diet on Discharge: Same as before.  Activity: as per instruction sheet.  Discharge to: Home with a responsible adult.  Follow up: 2-4 weeks       Please call the office at [phone] if you experience any weakness or loss of sensation,  fever > 101.5, pain uncontrolled with oral medications, persistent nausea/vomiting/or diarrhea, redness or drainage from the incisions, or any other worrisome concerns. If physician on call was not reached or could not communicate with our office for any reason please go to the nearest emergency department

## 2025-06-23 NOTE — PLAN OF CARE
Patient d/c home in stable condition via wheelchair with ride. Verbalized understanding of d/c instructions. Patient voiced no complaints. Patient stood at side of bed, walked steps with no new motor deficits. Neuro intact.   
Patient prepared for procedure.  
No

## 2025-06-23 NOTE — DISCHARGE INSTRUCTIONS

## 2025-06-23 NOTE — H&P
HPI  Patient presenting for Procedure(s) (LRB):  Bilateral L4-5 and L5-S1 RFA (Bilateral)     Patient on Anti-coagulation Yes, ASA, can continue    No health changes since previous encounter    Past Medical History:   Diagnosis Date    Abdominal wall hernia     CT Renal 6/11/2018---Small fat containing superior ventral abdominal wall hernia at the epicardial pacing lead site.    Abnormal mammogram 10/12/2021    Acute cystitis without hematuria 05/10/2022    Acute ischemic right middle cerebral artery (MCA) stroke 07/20/2024    Adverse drug reaction 11/12/2024    GRACE (acute kidney injury) 11/22/2021    Anxiety     Aphasia 07/19/2024    Arthritis     ZEN HIPS    Breast cancer in female 08/2021    LEFT BREAST    Breast mass, right 11/13/2024    RIGHT BREAST MASS (Problem)      Bronchitis 08/18/2016    Never smoked      C. difficile colitis 11/29/2021    Cellulitis of axilla, left 12/23/2021    Chronic diastolic heart failure 12/16/2021    Chronic kidney disease     stage 4, GFR 15-29 ml/min    Chronic midline low back pain without sciatica 06/18/2018    CKD (chronic kidney disease) stage 4, GFR 15-29 ml/min 04/20/2016    US Retro   5/16/2018---Mild cortical thinning and increased cortical echogenicity.  Findings can be seen with medical renal disease.  8/31/216--- Echogenic kidneys with diffuse cortical thinning suggesting  medical renal disease. 2 complex right renal cortical cysts.      Closed nondisplaced fracture of distal phalanx of left great toe with routine healing 10/22/2018    Coronary artery disease 1993    heart transplant    COVID-19 in immunocompromised patient 02/26/2024    Cystitis 05/10/2022    Depression     Elevated troponin 05/13/2025    Encounter for blood transfusion     Encounter for palliative care 10/14/2024    Endotracheally intubated 04/28/2025    Fibromyalgia     on Lyrica    Heart failure     native heart cardiomyopathy    Heart transplanted 1993    due to cardiomyopathy    History of  hyperparathyroidism; Hyperparathyroidism, secondary renal     PT DENIES    History of left breast cancer 2021    rad and chemo    Hypertension     Immune disorder     anti rejection meds    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    Impaired mobility 07/28/2022    Iron deficiency anemia 08/15/2017    Kidney stones     passed per pt    Lower extremity edema 12/11/2024    Multifactoral: CKD stage 5, CHF, venous insufficiency      Obesity     Obesity (BMI 30.0-34.9) 07/22/2019    Other osteoporosis without current pathological fracture 08/30/2019    Other pancytopenia 05/06/2024    Palliative care encounter 04/30/2025    Parotitis, acute 09/19/2023    Pharyngitis 01/09/2019    Pneumonia due to infectious organism 03/14/2024    PONV (postoperative nausea and vomiting)     Rapid palpitations 10/16/2024    Severe sepsis 11/22/2021    Shingles 2003 approx    left leg    Stage 4 chronic kidney disease 11/22/2021    Subclinical hypothyroidism 06/16/2023    Syncope 05/13/2025    Thrombocytopenia, unspecified 11/29/2021    Trouble in sleeping     Unresponsiveness 11/13/2024    Urinary incontinence      Past Surgical History:   Procedure Laterality Date    bladder implant Right 06/11/2024    BLADDER SURGERY  2015 approx    mesh - Dr Everett then 2nd reconstructive sx Dr Onofre    BREAST BIOPSY Bilateral     NEGATIVE    BREAST BIOPSY Right 10/31/2022    benign    BREAST LUMPECTOMY Left 2021    rad and chemo    BREAST SURGERY Left 09/28/2015    Bx - benign    BREAST SURGERY Right 12/2015    Bx benign    CARDIAC PACEMAKER REMOVAL Left 06/26/2014    Pacer defirillator removed. Put in 1993 aat time of heart transplant    CARPAL TUNNEL RELEASE Left 03/03/2015    Dr. Hall    COLONOSCOPY N/A 02/25/2021    Procedure: COLONOSCOPY;  Surgeon: Freida Ramirez MD;  Location: [facility];  Service: Endoscopy;  Laterality: N/A;    CYSTOCELE REPAIR      Twice with mesh removal    ECHOCARDIOGRAM,TRANSESOPHAGEAL N/A  04/26/2025    Procedure: Transesophageal echo (AYAAN) intra-procedure log documentation;  Surgeon: Barron Chinchilla MD;  Location: [facility] OR [address] [place] [address];  Service: Cardiothoracic;  Laterality: N/A;    EPIDURAL STEROID INJECTION INTO CERVICAL SPINE N/A 02/02/2023    Procedure: T11/T12 IL HELLEN;  Surgeon: Jassi Pierre MD;  Location: [facility]IN [facility]GT;  Service: Pain Management;  Laterality: N/A;    EPIDURAL STEROID INJECTION INTO CERVICAL SPINE N/A 09/16/2024    Procedure: T11/T12 IL HELLEN;  Surgeon: Jassi Pierre MD;  Location: [facility];  Service: Pain Management;  Laterality: N/A;    HEART TRANSPLANT  1993    HERNIA REPAIR Right 1971 approx    Inguinal    HYSTERECTOMY  1983    vag hyst /LSO     IMPLANTATION OF PERMANENT SACRAL NERVE STIMULATOR N/A 06/11/2024    Procedure: INSERTION, NEUROSTIMULATOR, PERMANENT, SACRAL;  Surgeon: Sami Cochran MD;  Location: [facility] OR;  Service: Urology;  Laterality: N/A;    INCISION AND DRAINAGE OF ABSCESS Left 12/24/2021    Procedure: INCISION AND DRAINAGE, ABSCESS;  Surgeon: Joseph Longo MD;  Location: [facility] OR;  Service: General;  Laterality: Left;    INJECTION OF ANESTHETIC AGENT AROUND MEDIAL BRANCH NERVES INNERVATING LUMBAR FACET JOINT Right 10/19/2022    Procedure: Right L4/L5 and L5/S1 MBB;  Surgeon: Jassi Pierre MD;  Location: [facility];  Service: Pain Management;  Laterality: Right;    INJECTION OF ANESTHETIC AGENT AROUND MEDIAL BRANCH NERVES INNERVATING LUMBAR FACET JOINT Right 11/09/2022    Procedure: Right L4/L5 and L5/S1 MBB;  Surgeon: Jassi Pierre MD;  Location: [facility];  Service: Pain Management;  Laterality: Right;    INJECTION OF ANESTHETIC AGENT AROUND MEDIAL BRANCH NERVES INNERVATING LUMBAR FACET JOINT Left 02/06/2025    Procedure: Left L4-5 and L5-S1 MBB #1 with local;  Surgeon: Jassi Pierre MD;  Location: [facility];  Service: Pain Management;  Laterality: Left;    INJECTION OF ANESTHETIC AGENT AROUND MEDIAL BRANCH  NERVES INNERVATING LUMBAR FACET JOINT Left 03/19/2025    Procedure: Left L4-5 and L5-S1 MBB #2 with local;  Surgeon: Jassi Pierre MD;  Location: [facility]IN [facility]GT;  Service: Pain Management;  Laterality: Left;    INJECTION OF ANESTHETIC AGENT INTO SACROILIAC JOINT Right 08/22/2022    Procedure: Right SIJ Injection Right L5/S1 Facte Injection;  Surgeon: Jassi Pierre MD;  Location: [facility]IN [facility];  Service: Pain Management;  Laterality: Right;    INSERTION OF TUNNELED CENTRAL VENOUS CATHETER (CVC) WITH SUBCUTANEOUS PORT N/A 11/09/2021    Procedure: [id number]-L-CATH;  Surgeon: Christoph Douglas MD;  Location: [facility] OR;  Service: General;  Laterality: N/A;    RADIOFREQUENCY ABLATION Right 12/05/2024    Procedure: Right L4-5 and L5-S1 RFA;  Surgeon: Jassi Pierre MD;  Location: [facility];  Service: Pain Management;  Laterality: Right;    RADIOFREQUENCY THERMOCOAGULATION Right 12/07/2022    Procedure: Right L4/L5 and L5/S1 Lumbar RFA;  Surgeon: Jassi Pierre MD;  Location: [facility];  Service: Pain Management;  Laterality: Right;    REMOVAL OF ELECTRODE LEAD OF SACRAL NERVE STIMULATOR N/A 02/18/2025    Procedure: REMOVAL, ELECTRODE LEAD, SACRAL NERVE STIMULATOR;  Surgeon: Sami Cochran MD;  Location: [facility];  Service: Urology;  Laterality: N/A;    REMOVAL OF VASCULAR ACCESS PORT      ROBOT-ASSISTED LAPAROSCOPIC ABDOMINAL SACROCOLPOPEXY N/A 08/10/2023    Procedure: ROBOTIC SACROCOLPOPEXY, ABDOMEN;  Surgeon: PRANAY Villalobos MD;  Location: [facility] OR;  Service: OB/GYN;  Laterality: N/A;    ROBOT-ASSISTED LAPAROSCOPIC OOPHORECTOMY Right 08/10/2023    Procedure: ROBOTIC OOPHORECTOMY;  Surgeon: PRANAY Villalobos MD;  Location: [facility] OR;  Service: OB/GYN;  Laterality: Right;    SENTINEL LYMPH NODE BIOPSY Left 10/12/2021    Procedure: BIOPSY, LYMPH NODE, SENTINEL;  Surgeon: Christoph Douglas MD;  Location: [facility] OR;  Service: General;  Laterality: Left;    TOE SURGERY      XI ROBOTIC URETHROPEXY N/A  "08/10/2023    Procedure: XI ROBOTIC URETHROPEXY;  Surgeon: PRANAY Villalobos MD;  Location: [facility] OR;  Service: OB/GYN;  Laterality: N/A;     Review of patient's allergies indicates:   Allergen Reactions    Dobutamine Other (See Comments)     Severe Left sided chest pain after dosage administered for Dobutamine Stress Test; itching    Lisinopril Swelling and Rash    Hydrocodone-acetaminophen Nausea Only    Augmentin [amoxicillin-pot clavulanate] Diarrhea    Zyvox [linezolid] Nausea And Vomiting        Medications Ordered Prior to Encounter[1]     PMHx, PSHx, Allergies, Medications reviewed in epic    ROS negative except pain complaints in HPI    OBJECTIVE:    /79 (BP Location: Right arm, Patient Position: Sitting)   Pulse 99   Temp 98 °F (36.7 °C) (Temporal)   Resp 16   Ht 5' 2" (1.575 m)   Wt 63.1 kg (139 lb 1.8 oz)   LMP 06/20/1983 (Within Years)   SpO2 98%   Breastfeeding No   BMI 25.44 kg/m²     PHYSICAL EXAMINATION:    GENERAL: Well appearing, in no acute distress, alert and oriented x3.  PSYCH:  Mood and affect appropriate.  SKIN: Skin color, texture, turgor normal, no rashes or lesions which will impact the procedure.  CV: RRR with palpation of the radial artery.  PULM: No evidence of respiratory difficulty, symmetric chest rise. Clear to auscultation.  NEURO: Cranial nerves grossly intact.    Plan:    Proceed with procedure as planned Procedure(s) (LRB):  Bilateral L4-5 and L5-S1 RFA (Bilateral)    Jassi Pierre MD  06/23/2025                 [1]   No current facility-administered medications on file prior to encounter.     Current Outpatient Medications on File Prior to Encounter   Medication Sig Dispense Refill    aspirin (ECOTRIN) 81 MG EC tablet Take 1 tablet (81 mg total) by mouth once daily.      carvediloL (COREG) 3.125 MG tablet TAKE 1 TABLET TWICE DAILY WITH MEALS 180 tablet 3    NIFEdipine (PROCARDIA-XL) 60 MG (OSM) 24 hr tablet Take 1 tablet (60 mg total) by mouth 2 (two) " times a day. 60 tablet 11    nitroGLYCERIN (NITROSTAT) 0.4 MG SL tablet PLACE 1 TABLET UNDER THE TONGUE EVERY 5 MINS AS NEEDED FOR CHEST PAIN FOR UP TO 3 DOSES.IF CONTINUED HEART PAIN/PRESSURE AFTER 100 tablet 0    calcitRIOL (ROCALTROL) 0.25 MCG Cap Take 1 capsule (0.25 mcg total) by mouth once daily. 30 capsule 11    cycloSPORINE (NEORAL) 100 mg/mL microemulsion solution Take 0.5 mLs (50 mg total) by mouth every 12 (twelve) hours. 50 mL 12    furosemide (LASIX) 40 MG tablet Take ½ tablet (20 mg total) by mouth once daily. 30 tablet 11    isosorbide mononitrate (IMDUR) 60 MG 24 hr tablet Take 1 tablet (60 mg total) by mouth once daily. 30 tablet 11    LIDOcaine (LIDODERM) 5 % Place 1 patch onto the skin daily as needed (RIb Pain). Place patch over the affected area for up to 12 hours.  Please wait 12 hours before placing a new patch. 30 patch 0    ondansetron (ZOFRAN) 4 MG tablet TAKE 1 TABLET EVERY 12 HOURS AS NEEDED FOR NAUSEA 16 tablet 0    pantoprazole (PROTONIX) 40 MG tablet Take 1 tablet (40 mg total) by mouth once daily. 90 tablet 3    polyethylene glycol (GLYCOLAX) 17 gram PwPk Take 17 g by mouth once daily.      pregabalin (LYRICA) 50 MG capsule Take 1 capsule (50 mg total) by mouth every evening. 30 capsule 6    sertraline (ZOLOFT) 25 MG tablet Take 1 tablet (25 mg total) by mouth once daily. 30 tablet 11    sodium bicarbonate 650 MG tablet Take 1 tablet (650 mg total) by mouth 2 (two) times daily. 60 tablet 1    temazepam (RESTORIL) 30 mg capsule Take 1 capsule (30 mg total) by mouth nightly as needed for Insomnia. 30 capsule 1    vitamin E 400 UNIT capsule Take 400 Units by mouth once daily.

## 2025-06-25 ENCOUNTER — LAB REQUISITION (OUTPATIENT)
Dept: LAB | Facility: HOSPITAL | Age: 70
End: 2025-06-25
Payer: MEDICARE

## 2025-06-25 ENCOUNTER — TELEPHONE (OUTPATIENT)
Dept: PRIMARY CARE CLINIC | Facility: CLINIC | Age: 70
End: 2025-06-25
Payer: MEDICARE

## 2025-06-25 DIAGNOSIS — I13.2 HYPERTENSIVE HEART AND CHRONIC KIDNEY DISEASE WITH HEART FAILURE AND WITH STAGE 5 CHRONIC KIDNEY DISEASE, OR END STAGE RENAL DISEASE: ICD-10-CM

## 2025-06-25 DIAGNOSIS — N18.5 CHRONIC KIDNEY DISEASE, STAGE 5: ICD-10-CM

## 2025-06-25 DIAGNOSIS — I25.758 ATHEROSCLEROSIS OF NATIVE CORONARY ARTERY OF TRANSPLANTED HEART WITH OTHER FORMS OF ANGINA PECTORIS: ICD-10-CM

## 2025-06-25 DIAGNOSIS — I50.32 CHRONIC DIASTOLIC (CONGESTIVE) HEART FAILURE: ICD-10-CM

## 2025-06-25 PROCEDURE — 85025 COMPLETE CBC W/AUTO DIFF WBC: CPT | Mod: HCNC | Performed by: INTERNAL MEDICINE

## 2025-06-25 PROCEDURE — 80051 ELECTROLYTE PANEL: CPT | Mod: HCNC | Performed by: INTERNAL MEDICINE

## 2025-06-25 PROCEDURE — 83735 ASSAY OF MAGNESIUM: CPT | Mod: HCNC | Performed by: INTERNAL MEDICINE

## 2025-06-25 NOTE — TELEPHONE ENCOUNTER
Informed pt that medication was d/c by            ----- Message from Jonna sent at 6/25/2025 12:01 PM CDT -----  Regarding: Rx Inquiry/Refill Request  Tizanididine 2mg BID -- pt has no refills, do you want her to continue to take it because it takes 3wks for it to take the full effect of the medication. (Just had surgery Monday) Medication goes to [facility] on [address], she would have caregiver to retrieve it for her.

## 2025-06-26 ENCOUNTER — RESULTS FOLLOW-UP (OUTPATIENT)
Dept: PRIMARY CARE CLINIC | Facility: CLINIC | Age: 70
End: 2025-06-26

## 2025-06-26 ENCOUNTER — TELEPHONE (OUTPATIENT)
Dept: PRIMARY CARE CLINIC | Facility: CLINIC | Age: 70
End: 2025-06-26
Payer: MEDICARE

## 2025-06-26 LAB
ABSOLUTE EOSINOPHIL (OHS): 0.07 K/UL
ABSOLUTE MONOCYTE (OHS): 0.46 K/UL (ref 0.3–1)
ABSOLUTE NEUTROPHIL COUNT (OHS): 2.91 K/UL (ref 1.8–7.7)
ANION GAP (OHS): 12 MMOL/L (ref 8–16)
BASOPHILS # BLD AUTO: 0.03 K/UL
BASOPHILS NFR BLD AUTO: 0.6 %
BUN SERPL-MCNC: 47 MG/DL (ref 8–23)
CALCIUM SERPL-MCNC: 9.4 MG/DL (ref 8.7–10.5)
CHLORIDE SERPL-SCNC: 105 MMOL/L (ref 95–110)
CO2 SERPL-SCNC: 23 MMOL/L (ref 23–29)
CREAT SERPL-MCNC: 3.2 MG/DL (ref 0.5–1.4)
ERYTHROCYTE [DISTWIDTH] IN BLOOD BY AUTOMATED COUNT: 15.9 % (ref 11.5–14.5)
GFR SERPLBLD CREATININE-BSD FMLA CKD-EPI: 15 ML/MIN/1.73/M2
GLUCOSE SERPL-MCNC: 84 MG/DL (ref 70–110)
HCT VFR BLD AUTO: 29.8 % (ref 37–48.5)
HGB BLD-MCNC: 9.6 GM/DL (ref 12–16)
HOLD SPECIMEN: NORMAL
IMM GRANULOCYTES # BLD AUTO: 0.03 K/UL (ref 0–0.04)
IMM GRANULOCYTES NFR BLD AUTO: 0.6 % (ref 0–0.5)
LYMPHOCYTES # BLD AUTO: 1.12 K/UL (ref 1–4.8)
MAGNESIUM SERPL-MCNC: 1.9 MG/DL (ref 1.6–2.6)
MCH RBC QN AUTO: 28.5 PG (ref 27–31)
MCHC RBC AUTO-ENTMCNC: 32.2 G/DL (ref 32–36)
MCV RBC AUTO: 88 FL (ref 82–98)
NUCLEATED RBC (/100WBC) (OHS): 0 /100 WBC
PLATELET # BLD AUTO: 214 K/UL (ref 150–450)
PMV BLD AUTO: 10.3 FL (ref 9.2–12.9)
POTASSIUM SERPL-SCNC: 4.4 MMOL/L (ref 3.5–5.1)
RBC # BLD AUTO: 3.37 M/UL (ref 4–5.4)
RELATIVE EOSINOPHIL (OHS): 1.5 %
RELATIVE LYMPHOCYTE (OHS): 24.2 % (ref 18–48)
RELATIVE MONOCYTE (OHS): 10 % (ref 4–15)
RELATIVE NEUTROPHIL (OHS): 63.1 % (ref 38–73)
SODIUM SERPL-SCNC: 140 MMOL/L (ref 136–145)
WBC # BLD AUTO: 4.62 K/UL (ref 3.9–12.7)

## 2025-06-26 NOTE — TELEPHONE ENCOUNTER
Spoke with pt and gave lab results and instructions.         ----- Message from Elis Wick MD sent at 6/26/2025  3:24 PM CDT -----  Pt has [facility] - if message below not viewed please call w information below:   Good day Ms. Damon - these labs have my name as having ordered but I don't recall actually ordering these.  Anyway - electrolytes look ok and renal function is stable. Please be sure you are drinking enough water especially with this hot weather.  Still anemic but blood count is essentially stable.    Please message or call with any questions or concerns!  Thank you!  Elis Wick MD, MPH  [facility]   ----- Message -----  From: Lab, Background User  Sent: 6/26/2025   2:02 PM CDT  To: Elis Wick MD

## 2025-07-03 ENCOUNTER — LAB REQUISITION (OUTPATIENT)
Dept: LAB | Facility: HOSPITAL | Age: 70
End: 2025-07-03
Payer: MEDICARE

## 2025-07-03 ENCOUNTER — INFUSION (OUTPATIENT)
Dept: INFUSION THERAPY | Facility: HOSPITAL | Age: 70
End: 2025-07-03
Attending: INTERNAL MEDICINE
Payer: MEDICARE

## 2025-07-03 VITALS
OXYGEN SATURATION: 98 % | RESPIRATION RATE: 18 BRPM | SYSTOLIC BLOOD PRESSURE: 119 MMHG | DIASTOLIC BLOOD PRESSURE: 65 MMHG | HEART RATE: 89 BPM

## 2025-07-03 DIAGNOSIS — M85.88 OSTEOPENIA OF LUMBAR SPINE: Primary | ICD-10-CM

## 2025-07-03 DIAGNOSIS — D63.1 ANEMIA ASSOCIATED WITH STAGE 5 CHRONIC RENAL FAILURE: ICD-10-CM

## 2025-07-03 DIAGNOSIS — N18.5 ANEMIA ASSOCIATED WITH STAGE 5 CHRONIC RENAL FAILURE: ICD-10-CM

## 2025-07-03 DIAGNOSIS — I50.32 CHRONIC DIASTOLIC (CONGESTIVE) HEART FAILURE: ICD-10-CM

## 2025-07-03 DIAGNOSIS — I25.758 ATHEROSCLEROSIS OF NATIVE CORONARY ARTERY OF TRANSPLANTED HEART WITH OTHER FORMS OF ANGINA PECTORIS: ICD-10-CM

## 2025-07-03 DIAGNOSIS — I13.2 HYPERTENSIVE HEART AND CHRONIC KIDNEY DISEASE WITH HEART FAILURE AND WITH STAGE 5 CHRONIC KIDNEY DISEASE, OR END STAGE RENAL DISEASE: ICD-10-CM

## 2025-07-03 LAB
ABSOLUTE EOSINOPHIL (OHS): 0.11 K/UL
ABSOLUTE MONOCYTE (OHS): 0.43 K/UL (ref 0.3–1)
ABSOLUTE NEUTROPHIL COUNT (OHS): 3.04 K/UL (ref 1.8–7.7)
ANION GAP (OHS): 17 MMOL/L (ref 8–16)
BASOPHILS # BLD AUTO: 0.01 K/UL
BASOPHILS NFR BLD AUTO: 0.2 %
BUN SERPL-MCNC: 54 MG/DL (ref 8–23)
CALCIUM SERPL-MCNC: 9 MG/DL (ref 8.7–10.5)
CHLORIDE SERPL-SCNC: 105 MMOL/L (ref 95–110)
CO2 SERPL-SCNC: 17 MMOL/L (ref 23–29)
CREAT SERPL-MCNC: 3.8 MG/DL (ref 0.5–1.4)
ERYTHROCYTE [DISTWIDTH] IN BLOOD BY AUTOMATED COUNT: 16.4 % (ref 11.5–14.5)
GFR SERPLBLD CREATININE-BSD FMLA CKD-EPI: 12 ML/MIN/1.73/M2
GLUCOSE SERPL-MCNC: 80 MG/DL (ref 70–110)
HCT VFR BLD AUTO: 27.7 % (ref 37–48.5)
HGB BLD-MCNC: 9 GM/DL (ref 12–16)
IMM GRANULOCYTES # BLD AUTO: 0.01 K/UL (ref 0–0.04)
IMM GRANULOCYTES NFR BLD AUTO: 0.2 % (ref 0–0.5)
LYMPHOCYTES # BLD AUTO: 0.93 K/UL (ref 1–4.8)
MAGNESIUM SERPL-MCNC: 2.1 MG/DL (ref 1.6–2.6)
MCH RBC QN AUTO: 28.8 PG (ref 27–31)
MCHC RBC AUTO-ENTMCNC: 32.5 G/DL (ref 32–36)
MCV RBC AUTO: 89 FL (ref 82–98)
NUCLEATED RBC (/100WBC) (OHS): 0 /100 WBC
PLATELET # BLD AUTO: 191 K/UL (ref 150–450)
PMV BLD AUTO: 9.9 FL (ref 9.2–12.9)
POTASSIUM SERPL-SCNC: 3.7 MMOL/L (ref 3.5–5.1)
RBC # BLD AUTO: 3.13 M/UL (ref 4–5.4)
RELATIVE EOSINOPHIL (OHS): 2.4 %
RELATIVE LYMPHOCYTE (OHS): 20.5 % (ref 18–48)
RELATIVE MONOCYTE (OHS): 9.5 % (ref 4–15)
RELATIVE NEUTROPHIL (OHS): 67.2 % (ref 38–73)
SODIUM SERPL-SCNC: 139 MMOL/L (ref 136–145)
WBC # BLD AUTO: 4.53 K/UL (ref 3.9–12.7)

## 2025-07-03 PROCEDURE — 85025 COMPLETE CBC W/AUTO DIFF WBC: CPT | Mod: HCNC | Performed by: INTERNAL MEDICINE

## 2025-07-03 PROCEDURE — 83735 ASSAY OF MAGNESIUM: CPT | Mod: HCNC | Performed by: INTERNAL MEDICINE

## 2025-07-03 PROCEDURE — 96372 THER/PROPH/DIAG INJ SC/IM: CPT | Mod: HCNC

## 2025-07-03 PROCEDURE — 63600175 PHARM REV CODE 636 W HCPCS: Mod: EC,TB,HCNC | Performed by: NURSE PRACTITIONER

## 2025-07-03 PROCEDURE — 80048 BASIC METABOLIC PNL TOTAL CA: CPT | Mod: HCNC | Performed by: INTERNAL MEDICINE

## 2025-07-03 RX ADMIN — EPOETIN ALFA-EPBX 20000 UNITS: 20000 INJECTION, SOLUTION INTRAVENOUS; SUBCUTANEOUS at 08:07

## 2025-07-03 NOTE — DISCHARGE INSTRUCTIONS
[facility]  [address] [facility]  [address]  [phone] phone     [phone] fax  Hours of Operation: Monday- Friday 8:00am- 5:00pm  After hours phone  [phone]  Hematology / Oncology Physicians on call      SALENA Thurman Dr., NP Phaon Dunbar, NP Khelsea Conley, FNP    Please call with any concerns regarding your appointment today.

## 2025-07-03 NOTE — NURSING
I reached out to provider to see if it was ok to give retacrit based on CBC collected 6/25 and provider said yes, to prevent a delay in care. Patient scheduled for labs and provider visit with possible retacrit for 2 weeks from now. Printout given to patient.   Injection given without difficulties.Bandaid applied. Patient instructed to stay in the clinic for 15 minutes. Patient verbalized understanding and will notify nurse with any complaints.

## 2025-07-08 ENCOUNTER — OFFICE VISIT (OUTPATIENT)
Dept: PRIMARY CARE CLINIC | Facility: CLINIC | Age: 70
End: 2025-07-08
Payer: MEDICARE

## 2025-07-08 ENCOUNTER — TELEPHONE (OUTPATIENT)
Dept: PRIMARY CARE CLINIC | Facility: CLINIC | Age: 70
End: 2025-07-08
Payer: MEDICARE

## 2025-07-08 VITALS
HEIGHT: 62 IN | HEART RATE: 94 BPM | WEIGHT: 148.69 LBS | TEMPERATURE: 97 F | SYSTOLIC BLOOD PRESSURE: 140 MMHG | BODY MASS INDEX: 27.36 KG/M2 | DIASTOLIC BLOOD PRESSURE: 68 MMHG | OXYGEN SATURATION: 98 %

## 2025-07-08 DIAGNOSIS — Z94.1 S/P ORTHOTOPIC HEART TRANSPLANT: ICD-10-CM

## 2025-07-08 DIAGNOSIS — N25.81 SECONDARY HYPERPARATHYROIDISM, RENAL: Chronic | ICD-10-CM

## 2025-07-08 DIAGNOSIS — E03.9 ACQUIRED HYPOTHYROIDISM: ICD-10-CM

## 2025-07-08 DIAGNOSIS — M81.0 OSTEOPOROSIS, UNSPECIFIED OSTEOPOROSIS TYPE, UNSPECIFIED PATHOLOGICAL FRACTURE PRESENCE: Primary | ICD-10-CM

## 2025-07-08 DIAGNOSIS — D63.1 ANEMIA ASSOCIATED WITH STAGE 5 CHRONIC RENAL FAILURE: Chronic | ICD-10-CM

## 2025-07-08 DIAGNOSIS — I25.758 CORONARY ARTERY DISEASE OF NATIVE ARTERY OF TRANSPLANTED HEART WITH STABLE ANGINA PECTORIS: Chronic | ICD-10-CM

## 2025-07-08 DIAGNOSIS — N18.5 ANEMIA ASSOCIATED WITH STAGE 5 CHRONIC RENAL FAILURE: Chronic | ICD-10-CM

## 2025-07-08 DIAGNOSIS — I10 ESSENTIAL HYPERTENSION: Chronic | ICD-10-CM

## 2025-07-08 DIAGNOSIS — F51.01 PRIMARY INSOMNIA: ICD-10-CM

## 2025-07-08 PROBLEM — J02.9 PHARYNGITIS: Status: RESOLVED | Noted: 2019-01-09 | Resolved: 2025-07-08

## 2025-07-08 PROCEDURE — 99999 PR PBB SHADOW E&M-EST. PATIENT-LVL V: CPT | Mod: PBBFAC,HCNC,, | Performed by: INTERNAL MEDICINE

## 2025-07-08 PROCEDURE — 3008F BODY MASS INDEX DOCD: CPT | Mod: CPTII,HCNC,S$GLB, | Performed by: INTERNAL MEDICINE

## 2025-07-08 PROCEDURE — 1160F RVW MEDS BY RX/DR IN RCRD: CPT | Mod: CPTII,HCNC,S$GLB, | Performed by: INTERNAL MEDICINE

## 2025-07-08 PROCEDURE — 3078F DIAST BP <80 MM HG: CPT | Mod: CPTII,HCNC,S$GLB, | Performed by: INTERNAL MEDICINE

## 2025-07-08 PROCEDURE — 3066F NEPHROPATHY DOC TX: CPT | Mod: CPTII,HCNC,S$GLB, | Performed by: INTERNAL MEDICINE

## 2025-07-08 PROCEDURE — 1159F MED LIST DOCD IN RCRD: CPT | Mod: CPTII,HCNC,S$GLB, | Performed by: INTERNAL MEDICINE

## 2025-07-08 PROCEDURE — 1157F ADVNC CARE PLAN IN RCRD: CPT | Mod: CPTII,HCNC,S$GLB, | Performed by: INTERNAL MEDICINE

## 2025-07-08 PROCEDURE — 99215 OFFICE O/P EST HI 40 MIN: CPT | Mod: HCNC,S$GLB,, | Performed by: INTERNAL MEDICINE

## 2025-07-08 PROCEDURE — 3077F SYST BP >= 140 MM HG: CPT | Mod: CPTII,HCNC,S$GLB, | Performed by: INTERNAL MEDICINE

## 2025-07-08 PROCEDURE — 1101F PT FALLS ASSESS-DOCD LE1/YR: CPT | Mod: CPTII,HCNC,S$GLB, | Performed by: INTERNAL MEDICINE

## 2025-07-08 PROCEDURE — 3288F FALL RISK ASSESSMENT DOCD: CPT | Mod: CPTII,HCNC,S$GLB, | Performed by: INTERNAL MEDICINE

## 2025-07-08 PROCEDURE — G0179 MD RECERTIFICATION HHA PT: HCPCS | Mod: ,,, | Performed by: INTERNAL MEDICINE

## 2025-07-08 PROCEDURE — 1125F AMNT PAIN NOTED PAIN PRSNT: CPT | Mod: CPTII,HCNC,S$GLB, | Performed by: INTERNAL MEDICINE

## 2025-07-08 RX ORDER — TRAZODONE HYDROCHLORIDE 50 MG/1
50 TABLET ORAL NIGHTLY PRN
Qty: 30 TABLET | Refills: 2 | Status: SHIPPED | OUTPATIENT
Start: 2025-07-08 | End: 2025-07-22

## 2025-07-08 RX ORDER — TRAZODONE HYDROCHLORIDE 50 MG/1
50 TABLET ORAL NIGHTLY PRN
Qty: 30 TABLET | Refills: 2 | Status: SHIPPED | OUTPATIENT
Start: 2025-07-08 | End: 2025-07-08

## 2025-07-08 RX ORDER — TEMAZEPAM 30 MG/1
30 CAPSULE ORAL NIGHTLY PRN
Qty: 30 CAPSULE | Refills: 1 | Status: SHIPPED | OUTPATIENT
Start: 2025-07-08

## 2025-07-08 NOTE — PATIENT INSTRUCTIONS
If you are feeling unwell, we'd like to be the first ones to know here at [facility]! Please give us a call. Same day appointments are our top priority to keep you well and out of the emergency rooms and hospitals. Call [phone] for our direct line. After hours advice is always available. Please call [phone] after hours to speak to the on-call team.      Recommend RSV vaccines that can be scheduled at your pharmacy of choice.      Recommend putting on compression socks/stockings first thing in the morning when edema is least     Please avoid sedating anti-histamines like benadryl for sleep

## 2025-07-08 NOTE — PROGRESS NOTES
Nadia Damon  07/08/2025  [id number]    Elis Wick MD  Patient Care Team:  Elis Wick MD as PCP - General (Internal Medicine)  Miguel Soni Jr., MD as Consulting Physician (Vascular Surgery)  Ike King MD as Consulting Physician (Cardiology)  Courtney Tubbs MD as Consulting Physician (Cardiology)  Carter Crawford MD as Consulting Physician (Nephrology)  Paxton Vasques OD as Consulting Physician (Optometry)  PRANAY Villalobos MD as Obstetrician (Obstetrics)  Ike King MD as Consulting Physician (Cardiology)  Jose Roland MD as Consulting Physician (Dermatology)  Prasanth Johnson MD as Consulting Physician (Rheumatology)  Elis Wick MD as Physician (Internal Medicine)  Lexi Bianchi LCSW as  (Hematology and Oncology)  Kelsie Burk PA-C as Physician Assistant (Hematology and Oncology)  Chelsea Monte as ED Navigator  Anna Regalado LMSW as  (Hematology and Oncology)    Visit Type: Follow-up    Ms. Damon is a 69 year old female here for scheduled f/u.     Ms. Damon w h/o heart transplant 1993, on anti-rejection medication.   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   She was treated with 1 cycle of systemic chemotherapy cytoxan and taxotere and udenyca that was discontinued due to toxicity.   She was followed by radiation oncology and completed last radiation treatment 5/14/22.   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       Ms. Damon suffered CVA 7/19/24 with subsequent hospitalization then transfer to [facility] to address on-going dysarthria and R-sided weakness. She also has severe multi-level spinal stenosis for which she is followed by Pain Management.     Other chronic medical issues include depresssion/anxiety/insomnia, hypertension,  chronic diastolic CHF, ventral abdominal hernia, neurogenic bladder, and h/o osteoporosis for which she had been receiving Prolia.      Ms. Damon reports home SBP up to 170   Pulse often up into low 100's  Occasional low BP's 98/60 101/70    HH OT completed  SN still coming once a week     Sad due to recent passing of her friend and neighbor Ms. Arina Barajas (who was also O65+ pt).    History of Present Illness    CHIEF COMPLAINT:  Ms. Damon presents today for follow up after nerve ablation procedure.    PAIN MANAGEMENT:  She reports significant improvement following bilateral nerve ablation at L4-5 and L5-S1 levels, with minimal right-sided discomfort currently. She denies any recent falls.    BLOOD PRESSURE:  She reports blood pressure fluctuations ranging from 98/60 to 160/70, correlating with Coreg timing which she takes twice daily. She experiences occasional dizziness with positional changes. She monitors blood pressure at home with weekly home health nursing visits.    SLEEP:  She reports significant sleep disturbances including nocturnal wandering at 2-3 AM. She prefers sleeping in a chair despite caregiver attempts to improve bed comfort with reclining support and back pillow. Current sleep medication Temazepam is ineffective, as were previous trials of melatonin, sedating antihistamines, sleepytime tea, and Trazodone. She has difficulty initiating and maintaining sleep, with occasional unplanned daytime rest periods when exhausted.    LOWER EXTREMITY EDEMA:  She reports persistent bilateral lower extremity edema with significant ankle and leg swelling that does not completely resolve. She uses compression socks when going out and elevates legs at home for partial relief. The swelling increases with activity and improves somewhat after rest and elevation.    FUNCTIONAL STATUS:  She has difficulty with walking due to leg pain and fatigue, causing occasional stumbling. She requires assistance with  household tasks including mopping, washing, and cooking. She notes some improvement in overall energy levels despite continued functional limitations.    MEDICAL HISTORY:  She has a history of heart transplant and osteoporosis. Dexa scan from last summer showed stable hip density and slightly reduced vertebral density. 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    ROS:  General: -fever, -chills, +fatigue, -weight gain, -weight loss  Eyes: -vision changes, -redness, -discharge  ENT: -ear pain, -nasal congestion, -sore throat, +hoarseness  Cardiovascular: -chest pain, -palpitations, +lower extremity edema, +feelings of fast heart rate  Respiratory: -cough, -shortness of breath, +productive cough  Gastrointestinal: -abdominal pain, -nausea, -vomiting, -diarrhea, +constipation, -blood in stool  Genitourinary: -dysuria, -hematuria  Musculoskeletal: -joint pain, -muscle pain, +limb pain, +abnormal gait  Skin: -rash, -lesion  Neurological: -headache, +dizziness, -numbness, -tingling, +difficulty staying asleep, +sleep disturbances  Psychiatric: -anxiety, -depression, +sleep difficulty        PHQ-4 Score: 0     From LOV w me 6/04/25  CHIEF COMPLAINT:  Ms. Damon presents today for scheduled follow-up.  ACUTE PHARYNGITIS:  She is completing antibiotic course for pharyngitis prescribed by Palliative NP Randolph. She reports that her throat is feeling better - denies trouble swallowing. Still with hoarse voice however and some tenderness still right submandibular area. No fever.   CHRONIC KIDNEY DISEASE:  She has Stage 4/5 chronic kidney disease that is progressing. She is now being followed by Dr. Melgar. She is contemplating whether or not would want to proceed w hemodialysis if needed.  ANEMIA:  She reports feeling cold requiring multiple comforters, experiencing constant fatigue, and  recent onset of headaches. She denies palpitations. Weekly blood draws are currently planned for continued close monitoring. In hospital blood transfusion last month increased hemoglobin to 10 but blood count has dropped again, with hemoglobin 7.9 on June 2nd.  CARDIOVASCULAR:  She has coronary artery disease with persistent tachycardia over the past couple weeks heart rates ranging from 101-107 BPM. Low dose carvedilol discontinued by cardiology, Dr. Hawkins with increase of nifedipine and addition of imdur. She reports significant reduction in anginal chest pain with addition of imdur. Cardiac stress test and ultrasound are scheduled for July. She is anxious about the cardiac stress test given bad experience when last attempted.  MUSCULOSKELETAL:  She reports worsening bilateral mid thoracic back pain. It had gotten better for a while. Back brace causes increased discomfort over ventral hernia and left rib cage which is  s/p compressions during in hospital cardiac event last month.   PHQ-4 Score: 0     Hosp/ED/Urgent Care:  6/23/25 [facility] L4-5 & L5-S1    Recent appointments:   7/03/25 Infusion retacrit  6/09/25 Michelle Vasques cataract    Upcoming appointments:   Next 10 Appointments       Date Provider Specialty Appt Notes    7/15/2025 Huseyin Marx MD Ophthalmology CAT EVAL    7/17/2025  Lab STAT CBC/    7/17/2025 Yudith Mendoza NP Hematology and Oncology 4 wks lab retacrit    7/17/2025  Chemotherapy retacrit after NP REF [id number]    7/17/2025  Radiology     7/17/2025  Pulmonology     7/17/2025  Radiology     7/17/2025  Cardiology     7/22/2025 Génesis Gonzales LCSW Primary Care NEEDS LYFT - [facility]    7/30/2025 Jassi Pierre MD Pain Medicine p inj f/u             The following were reviewed: Active problem list, medication list, allergies, family history, social history, and Health Maintenance.     Medications have been reviewed and reconciled with patient at  visit today.    Exam: sat 98%  Vitals:    07/08/25 1523   BP: (!) 140/68   Pulse: 94   Temp: 96.9 °F (36.1 °C)     Weight: 67.4 kg (148 lb 11.2 oz)   Body mass index is 27.2 kg/m².     BP Readings from Last 3 Encounters:   07/08/25 (!) 140/68   07/03/25 119/65   06/23/25 (!) 144/78     Wt Readings from Last 3 Encounters:   07/08/25 1523 67.4 kg (148 lb 11.2 oz)   06/23/25 1020 63.1 kg (139 lb 1.8 oz)   06/04/25 1008 63.4 kg (139 lb 12.4 oz)     Physical Exam  Vitals reviewed.   Constitutional:       General: She is not in acute distress.     Appearance: Normal appearance. She is not ill-appearing.   HENT:      Head: Normocephalic and atraumatic.      Right Ear: Tympanic membrane, ear canal and external ear normal.      Left Ear: Ear canal and external ear normal.      Nose: Nose normal.      Mouth/Throat:      Mouth: Mucous membranes are moist.      Pharynx: Oropharynx is clear. No oropharyngeal exudate or posterior oropharyngeal erythema.      Comments: Plate on top  Eyes:      General: No scleral icterus.     Extraocular Movements: Extraocular movements intact.      Conjunctiva/sclera: Conjunctivae normal.      Comments: glasses   Neck:      Vascular: No carotid bruit.   Cardiovascular:      Rate and Rhythm: Normal rate and regular rhythm.      Heart sounds: No murmur heard.     Comments: hyperdynamic  Pulmonary:      Effort: Pulmonary effort is normal. No respiratory distress.      Breath sounds: No wheezing, rhonchi or rales.   Abdominal:      General: Bowel sounds are normal. There is no distension.      Palpations: Abdomen is soft.      Tenderness: There is no abdominal tenderness. There is no guarding.   Musculoskeletal:      Right lower leg: Edema present.      Left lower leg: Edema present.   Lymphadenopathy:      Cervical: No cervical adenopathy.   Skin:     General: Skin is warm and dry.   Neurological:      Mental Status: She is alert and oriented to person, place, and time. Mental status is at baseline.       Comments: Ambulating w cane for support   Psychiatric:         Mood and Affect: Mood normal.         Behavior: Behavior normal.         Thought Content: Thought content normal.         Judgment: Judgment normal.        Laboratory Reviewed  Lab Results   Component Value Date    WBC 4.53 07/03/2025    HGB 9.0 (L) 07/03/2025    HCT 27.7 (L) 07/03/2025     07/03/2025    MCV 89 07/03/2025    CHOL 196 04/25/2025    TRIG 192 (H) 04/25/2025    HDL 59 04/25/2025    LDLCALC 98.6 04/25/2025    ALT 28 05/18/2025    AST 39 05/18/2025     07/03/2025    K 3.7 07/03/2025     07/03/2025    CREATININE 3.8 (H) 07/03/2025    BUN 54 (H) 07/03/2025    CO2 17 (L) 07/03/2025    MG 2.1 07/03/2025    TSH 17.189 (H) 06/16/2025    FREET4 0.93 06/16/2025    PSA <0.1 05/27/2008    INR 0.9 05/07/2025    HGBA1C 5.1 07/02/2024    CRP 14.4 (H) 08/24/2023     Lab Results   Component Value Date    .3 (H) 03/25/2025    CALCIUM 9.0 07/03/2025    CAION 1.13 09/05/2018    PHOS 3.9 05/18/2025      Lab Results   Component Value Date    JSYFZVFO41 1,254 (H) 05/17/2025     Lab Results   Component Value Date    FOLATE 16.7 05/17/2025      Lab Results   Component Value Date    IRON 74 06/16/2025    TRANSFERRIN 243 06/16/2025    TIBC 360 06/16/2025    LABIRON 21 06/16/2025    FESATURATED 20 02/24/2025      Lab Results   Component Value Date    EGFRNORACEVR 12 (L) 07/03/2025    ALBUMIN 2.8 (L) 05/18/2025     (H) 05/12/2025     Lab Results   Component Value Date    NCLDFXUQ18TU 50 03/25/2025        Assessment:   70 y.o. female with multiple co-morbid illnesses here for continued follow up of medical problems.      The primary encounter diagnosis was Osteoporosis, unspecified osteoporosis type, unspecified pathological fracture presence. Diagnoses of Secondary hyperparathyroidism, renal, S/P orthotopic heart transplant, Essential hypertension, Coronary artery disease of native artery of transplanted heart with stable angina  pectoris, Anemia associated with stage 5 chronic renal failure, Acquired hypothyroidism, and Primary insomnia were also pertinent to this visit.      Plan:   1. Osteoporosis, unspecified osteoporosis type, unspecified pathological fracture presence  -     Ambulatory referral/consult to Rheumatology; Future; Expected date: 07/15/2025    2. Secondary hyperparathyroidism, renal  -     Ambulatory referral/consult to Rheumatology; Future; Expected date: 07/15/2025    3. S/P orthotopic heart transplant  Overview:  5/93     Assessment & Plan:  Chronic immunosuppression w cyclosporine - maintains f/u w [facility] NO transplant      4. Essential hypertension  Assessment & Plan:  Labile BP with intermittently elevated heart rate - f/u on home BP's - cont current Rx for now       5. Coronary artery disease of native artery of transplanted heart with stable angina pectoris  Assessment & Plan:  Nervous about upcoming cardiac stress test - encouraged to follow through as scheduled and to not hesitate to insist they stop if sig discomfort develops during test      6. Anemia associated with stage 5 chronic renal failure  Assessment & Plan:  Maintain f/u Heme/Onc, Nephrology, vascular - receiving Recrit       7. Acquired hypothyroidism  Assessment & Plan:  Given continued gradual increase of TSH, low dose levothyroxine was added last month (following e-consult to Endo per pt preference/request) - repeat TFTs in August 8. Primary insomnia  -     temazepam (RESTORIL) 30 mg capsule; Take 1 capsule (30 mg total) by mouth nightly as needed for Insomnia.  Dispense: 30 capsule; Refill: 1    Other orders  -     Discontinue: traZODone (DESYREL) 50 MG tablet; Take 1 tablet (50 mg total) by mouth nightly as needed for Insomnia.  Dispense: 30 tablet; Refill: 2  -     traZODone (DESYREL) 50 MG tablet; Take 1 tablet (50 mg total) by mouth nightly as needed for Insomnia.  Dispense: 30 tablet; Refill: 2         Health Maintenance         Date Due  Completion Date    RSV Vaccine (Age 60+ and Pregnant patients) (1 - Risk 60-74 years 1-dose series) Never done ---    Influenza Vaccine (1) 09/01/2025 10/16/2024    Colorectal Cancer Screening 02/25/2026 2/25/2021    Lipid Panel 04/25/2026 4/25/2025    Mammogram 06/10/2026 6/10/2025    DEXA Scan 07/03/2026 7/3/2024    TETANUS VACCINE 09/09/2030 9/9/2020          -Patient's lab results were reviewed and discussed with patient  -Treatment options and alternatives were discussed with the patient. Patient expressed understanding. Patient was given the opportunity to ask questions and be an active participant in their medical care. Patient had no further questions or concerns at this time.     Follow up: Follow up in about 1 month (around 8/8/2025) for Follow Up w me.    Care Plan/Goals: Reviewed Yes   Goals         limit pain - extend life - remain independent (pt-stated)       Achievable - within patient's control: Yes    Difficulties Identified: significant thoracic back pain - advanced CKD - s/p heart transplant    Plan for Overcoming difficulties: cont close f/u w specialists, healthy food and beverage choices, support of friends    Timeframe for completion: indefinite                    After visit summary printed and given to patient upon discharge.  Patient goals and care plan are included in After visit summary.    TOTAL TIME evaluating and managing this patient for this encounter was 52 minutes. This time was spent personally by me on some of the following activities: review of patient's past medical history, assessing age-appropriate health maintenance needs, review of any interval history, review and interpretation of lab results, review and interpretation of imaging test results, review and interpretation of cardiology test results, reviewing consulting specialist notes, obtaining history from the patient and family, examination of the patient, medication reconciliation, managing and/or ordering prescription  medications, ordering imaging tests, ordering referral to subspecialty provider(s), educating patient and answering their questions about diagnosis, treatment plan, and goals of treatment, discussing planned follow-up and final documentation of the visit. This time was exclusive of any separately billable procedures for this patient and exclusive of time spent treating any other patients.     This note was generated with the assistance of ambient listening technology. Verbal consent was obtained by the patient and accompanying visitor(s) for the recording of patient appointment to facilitate this note. I attest to having reviewed and edited the generated note for accuracy, though some syntax or spelling errors may persist. Please contact the author of this note for any clarification.

## 2025-07-15 ENCOUNTER — OFFICE VISIT (OUTPATIENT)
Dept: OPHTHALMOLOGY | Facility: CLINIC | Age: 70
End: 2025-07-15
Payer: MEDICARE

## 2025-07-15 DIAGNOSIS — H25.011 CORTICAL SENILE CATARACT OF RIGHT EYE: Primary | ICD-10-CM

## 2025-07-15 DIAGNOSIS — H25.012 CORTICAL SENILE CATARACT OF LEFT EYE: ICD-10-CM

## 2025-07-15 DIAGNOSIS — H18.513 FUCHS' CORNEAL DYSTROPHY OF BOTH EYES: ICD-10-CM

## 2025-07-15 PROCEDURE — 99999 PR PBB SHADOW E&M-EST. PATIENT-LVL III: CPT | Mod: PBBFAC,HCNC,, | Performed by: OPHTHALMOLOGY

## 2025-07-15 RX ORDER — PREDNISOLONE ACETATE 10 MG/ML
1 SUSPENSION/ DROPS OPHTHALMIC 4 TIMES DAILY
Qty: 5 ML | Refills: 1 | Status: SHIPPED | OUTPATIENT
Start: 2025-07-15

## 2025-07-15 NOTE — PROGRESS NOTES
HPI     Cataract            Comments: Referred by TRF for a cataract eval    Patient states VA has decreased at all ranges, having trouble reading   small print and she no longer drives at night secondary to headlight glare   becoming bothersome.          Comments     REFERRAL     1. Cataracts OU  2. Refractive error    **Imdur**  **Coreg**             Last edited by Vanna Pryor, Patient Care Assistant on 7/15/2025  2:13   PM.            Assessment /Plan     For exam results, see Encounter Report.      ICD-10-CM ICD-9-CM    1. Cortical senile cataract of right eye  H25.011 366.15 E-Consult to General Cardiology    Visually Significant Cataract OD  Patient reports decreased vision in the fellow eye consistent with the clinical amount of lenticular opacity, which reaches the level of visual significance and affects activities of daily living including reading and glare. Risks, benefits, and alternatives to cataract surgery were discussed and patient desired to schedule cataract surgery. Patient was consented and the biometry and lens options were reviewed.    Discussed IOL options and refractive outcomes for this patient.    Topography Reviewed: Yes  History of Refractive Surgery: No     [place] right eye, +24.0 CNWET0   W/ kenalog   Vision blue   Complex   Topical  Vivity  Prescriptions sent for preoperative medications  Prednisolone Acetate- 4xs daily   Anticoagulant status: Yes - ASA   Pt underwent recent respiratory failure and acute kidney injury 04/2025  Will consult cardio to confirm she can d/c asa 14 days prior to surgery    Patient was counciled regarding the increased risk of the surgery as a result of dense cataract which could lead to the need for an anterior vitrectomy.     Explained that patient may need glasses after surgery.  Discussed that vision may be limited by: Cornea       Referral to [facility] for Ophthalmic surgery      Schedule post op visit #2 with MGM      2.  Cortical senile cataract of left eye  H25.012 366.15       3. Fuchs' corneal dystrophy of both eyes  H18.513 371.57

## 2025-07-16 ENCOUNTER — E-CONSULT (OUTPATIENT)
Dept: CARDIOLOGY | Facility: CLINIC | Age: 70
End: 2025-07-16
Payer: MEDICARE

## 2025-07-16 DIAGNOSIS — Z01.810 PREOP CARDIOVASCULAR EXAM: Primary | ICD-10-CM

## 2025-07-16 RX ORDER — SODIUM BICARBONATE 650 MG/1
650 TABLET ORAL 2 TIMES DAILY
Qty: 60 TABLET | Refills: 1 | Status: SHIPPED | OUTPATIENT
Start: 2025-07-16

## 2025-07-17 ENCOUNTER — OFFICE VISIT (OUTPATIENT)
Dept: HEMATOLOGY/ONCOLOGY | Facility: CLINIC | Age: 70
End: 2025-07-17
Payer: MEDICARE

## 2025-07-17 ENCOUNTER — HOSPITAL ENCOUNTER (OUTPATIENT)
Dept: CARDIOLOGY | Facility: HOSPITAL | Age: 70
Discharge: HOME | End: 2025-07-17
Attending: INTERNAL MEDICINE
Payer: MEDICARE

## 2025-07-17 ENCOUNTER — HOSPITAL ENCOUNTER (OUTPATIENT)
Dept: PULMONOLOGY | Facility: HOSPITAL | Age: 70
Discharge: HOME | End: 2025-07-17
Attending: INTERNAL MEDICINE
Payer: MEDICARE

## 2025-07-17 ENCOUNTER — INFUSION (OUTPATIENT)
Dept: INFUSION THERAPY | Facility: HOSPITAL | Age: 70
End: 2025-07-17
Attending: INTERNAL MEDICINE
Payer: MEDICARE

## 2025-07-17 ENCOUNTER — HOSPITAL ENCOUNTER (OUTPATIENT)
Dept: RADIOLOGY | Facility: HOSPITAL | Age: 70
Discharge: HOME | End: 2025-07-17
Attending: INTERNAL MEDICINE
Payer: MEDICARE

## 2025-07-17 VITALS
DIASTOLIC BLOOD PRESSURE: 68 MMHG | HEART RATE: 87 BPM | HEIGHT: 62 IN | HEIGHT: 62 IN | BODY MASS INDEX: 26.7 KG/M2 | SYSTOLIC BLOOD PRESSURE: 139 MMHG | WEIGHT: 146 LBS | BODY MASS INDEX: 26.87 KG/M2

## 2025-07-17 VITALS
HEIGHT: 62 IN | SYSTOLIC BLOOD PRESSURE: 114 MMHG | DIASTOLIC BLOOD PRESSURE: 68 MMHG | HEART RATE: 84 BPM | BODY MASS INDEX: 26.94 KG/M2 | HEART RATE: 84 BPM | TEMPERATURE: 97 F | WEIGHT: 146.38 LBS | OXYGEN SATURATION: 96 % | SYSTOLIC BLOOD PRESSURE: 114 MMHG | RESPIRATION RATE: 18 BRPM | OXYGEN SATURATION: 96 % | TEMPERATURE: 97 F | DIASTOLIC BLOOD PRESSURE: 68 MMHG

## 2025-07-17 DIAGNOSIS — M85.88 OSTEOPENIA OF LUMBAR SPINE: Primary | ICD-10-CM

## 2025-07-17 DIAGNOSIS — D05.12 DUCTAL CARCINOMA IN SITU (DCIS) OF LEFT BREAST: ICD-10-CM

## 2025-07-17 DIAGNOSIS — I10 CHRONIC HYPERTENSION: ICD-10-CM

## 2025-07-17 DIAGNOSIS — N18.5 ANEMIA ASSOCIATED WITH STAGE 5 CHRONIC RENAL FAILURE: Primary | ICD-10-CM

## 2025-07-17 DIAGNOSIS — D63.1 ANEMIA ASSOCIATED WITH STAGE 5 CHRONIC RENAL FAILURE: ICD-10-CM

## 2025-07-17 DIAGNOSIS — Z94.1 S/P ORTHOTOPIC HEART TRANSPLANT: ICD-10-CM

## 2025-07-17 DIAGNOSIS — I25.758: ICD-10-CM

## 2025-07-17 DIAGNOSIS — R07.89 OTHER CHEST PAIN: ICD-10-CM

## 2025-07-17 DIAGNOSIS — D63.1 ANEMIA ASSOCIATED WITH STAGE 5 CHRONIC RENAL FAILURE: Primary | ICD-10-CM

## 2025-07-17 DIAGNOSIS — N18.5 ANEMIA ASSOCIATED WITH STAGE 5 CHRONIC RENAL FAILURE: ICD-10-CM

## 2025-07-17 LAB
AORTIC ROOT ANNULUS: 3 CM
AORTIC SIZE INDEX: 1.9 CM/M2
APICAL FOUR CHAMBER EJECTION FRACTION: 81 %
ASCENDING AORTA: 3.2 CM
AV INDEX (PROSTH): 0.86
AV MEAN GRADIENT: 5 MMHG
AV PEAK GRADIENT: 8 MMHG
AV VALVE AREA BY VELOCITY RATIO: 3 CM²
AV VALVE AREA: 3 CM²
AV VELOCITY RATIO: 0.86
BSA FOR ECHO PROCEDURE: 1.7 M2
CV ECHO LV RWT: 0.58 CM
CV STRESS BASE HR: 86 BPM
DIASTOLIC BLOOD PRESSURE: 68 MMHG
DOP CALC AO PEAK VEL: 1.4 M/S
DOP CALC AO VTI: 29.7 CM
DOP CALC LVOT AREA: 3.5 CM2
DOP CALC LVOT DIAMETER: 2.1 CM
DOP CALC LVOT PEAK VEL: 1.2 M/S
DOP CALC LVOT STROKE VOLUME: 88.6 CM3
DOP CALC MV VTI: 18.9 CM
DOP CALC RVOT PEAK VEL: 0.74 M/S
DOP CALC RVOT VTI: 14.1 CM
DOP CALCLVOT PEAK VEL VTI: 25.6 CM
E WAVE DECELERATION TIME: 107 MSEC
E/A RATIO: 1.7
E/E' RATIO: 11 M/S
ECHO LV POSTERIOR WALL: 1.1 CM (ref 0.6–1.1)
EJECTION FRACTION- HIGH: 73 %
EJECTION FRACTION: 60 %
END DIASTOLIC INDEX-HIGH: 165 ML/M2
END DIASTOLIC INDEX-LOW: 101 ML/M2
END SYSTOLIC INDEX-HIGH: 64 ML/M2
END SYSTOLIC INDEX-LOW: 28 ML/M2
FRACTIONAL SHORTENING: 44.7 % (ref 28–44)
INTERVENTRICULAR SEPTUM: 1.3 CM (ref 0.6–1.1)
IVC DIAMETER: 0.66 CM
LA MAJOR: 8 CM
LA MINOR: 7.8 CM
LA WIDTH: 4.5 CM
LEFT ATRIUM AREA SYSTOLIC (APICAL 2 CHAMBER): 29.73 CM2
LEFT ATRIUM AREA SYSTOLIC (APICAL 4 CHAMBER): 27.85 CM2
LEFT ATRIUM SIZE: 4 CM
LEFT ATRIUM VOLUME INDEX MOD: 52 ML/M2
LEFT ATRIUM VOLUME INDEX: 72 ML/M2
LEFT ATRIUM VOLUME MOD: 87 ML
LEFT ATRIUM VOLUME: 121 CM3
LEFT INTERNAL DIMENSION IN SYSTOLE: 2.1 CM (ref 2.1–4)
LEFT VENTRICLE DIASTOLIC VOLUME INDEX: 36.53 ML/M2
LEFT VENTRICLE DIASTOLIC VOLUME: 61 ML
LEFT VENTRICLE END DIASTOLIC VOLUME APICAL 4 CHAMBER: 55.77 ML
LEFT VENTRICLE END SYSTOLIC VOLUME APICAL 2 CHAMBER: 94.66 ML
LEFT VENTRICLE END SYSTOLIC VOLUME APICAL 4 CHAMBER: 77.7 ML
LEFT VENTRICLE MASS INDEX: 91.8 G/M2
LEFT VENTRICLE SYSTOLIC VOLUME INDEX: 8.4 ML/M2
LEFT VENTRICLE SYSTOLIC VOLUME: 14 ML
LEFT VENTRICULAR INTERNAL DIMENSION IN DIASTOLE: 3.8 CM (ref 3.5–6)
LEFT VENTRICULAR MASS: 153.2 G
LV LATERAL E/E' RATIO: 7.7 M/S
LV SEPTAL E/E' RATIO: 18.4 M/S
LVED V (TEICH): 61.11 ML
LVES V (TEICH): 14.15 ML
LVOT MG: 3.72 MMHG
LVOT MV: 0.91 CM/S
Lab: 2 CM/M
MV MEAN GRADIENT: 2 MMHG
MV PEAK A VEL: 0.54 M/S
MV PEAK E VEL: 0.92 M/S
MV PEAK GRADIENT: 4 MMHG
MV STENOSIS PRESSURE HALF TIME: 35.53 MS
MV VALVE AREA BY CONTINUITY EQUATION: 4.69 CM2
MV VALVE AREA P 1/2 METHOD: 6.19 CM2
NUC REST EJECTION FRACTION: 90
NUC STRESS EJECTION FRACTION: 91 %
OHS CV CPX 85 PERCENT MAX PREDICTED HEART RATE MALE: 127
OHS CV CPX MAX PREDICTED HEART RATE: 149
OHS CV CPX PATIENT HEIGHT IN: 62
OHS CV CPX PATIENT HEIGHT IN: 62
OHS CV CPX PATIENT IS FEMALE: 1
OHS CV CPX PATIENT IS MALE: 0
OHS CV CPX PEAK DIASTOLIC BLOOD PRESSURE: 68 MMHG
OHS CV CPX PEAK HEAR RATE: 93 BPM
OHS CV CPX PEAK RATE PRESSURE PRODUCT: NORMAL
OHS CV CPX PEAK SYSTOLIC BLOOD PRESSURE: 139 MMHG
OHS CV CPX PERCENT MAX PREDICTED HEART RATE ACHIEVED: 65
OHS CV CPX RATE PRESSURE PRODUCT PRESENTING: NORMAL
OHS CV INITIAL DOSE: 11.1 MCG/KG/MIN
OHS CV PEAK DOSE: 33 MCG/KG/MIN
OHS CV RV/LV RATIO: 0.79 CM
PISA TR MAX VEL: 2.7 M/S
PV MEAN GRADIENT: 1 MMHG
PV MV: 0.71 M/S
PV PEAK GRADIENT: 2 MMHG
PV PEAK VELOCITY: 0.97 M/S
RA MAJOR: 6.39 CM
RA PRESSURE ESTIMATED: 3 MMHG
RA VOL SYS: 61.69 ML
RA WIDTH: 3.7 CM
RETIRED EF AND QEF - SEE NOTES: 59 %
RIGHT ATRIAL AREA: 22.1 CM2
RIGHT ATRIUM END SYSTOLIC VOLUME APICAL 4 CHAMBER INDEX BSA: 36.73 ML/M2
RIGHT ATRIUM VOLUME AREA LENGTH APICAL 4 CHAMBER: 61.34 ML
RIGHT VENTRICLE DIASTOLIC BASEL DIMENSION: 3 CM
RIGHT VENTRICULAR END-DIASTOLIC DIMENSION: 3.01 CM
RV TB RVSP: 6 MMHG
RV TISSUE DOPPLER FREE WALL SYSTOLIC VELOCITY 1 (APICAL 4 CHAMBER VIEW): 10.28 CM/S
STJ: 2.8 CM
SYSTOLIC BLOOD PRESSURE: 139 MMHG
TDI LATERAL: 0.12 M/S
TDI SEPTAL: 0.05 M/S
TDI: 0.09 M/S
TR MAX PG: 30 MMHG
TRICUSPID ANNULAR PLANE SYSTOLIC EXCURSION: 1.3 CM
TV REST PULMONARY ARTERY PRESSURE: 32 MMHG
Z-SCORE OF LEFT VENTRICULAR DIMENSION IN END DIASTOLE: -2.06
Z-SCORE OF LEFT VENTRICULAR DIMENSION IN END SYSTOLE: -2.53

## 2025-07-17 PROCEDURE — A9502 TC99M TETROFOSMIN: HCPCS | Mod: HCNC | Performed by: INTERNAL MEDICINE

## 2025-07-17 PROCEDURE — 93306 TTE W/DOPPLER COMPLETE: CPT | Mod: 26,HCNC,, | Performed by: INTERNAL MEDICINE

## 2025-07-17 PROCEDURE — 63600175 PHARM REV CODE 636 W HCPCS: Mod: EC,TB,HCNC | Performed by: NURSE PRACTITIONER

## 2025-07-17 PROCEDURE — 96372 THER/PROPH/DIAG INJ SC/IM: CPT | Mod: HCNC

## 2025-07-17 PROCEDURE — 78452 HT MUSCLE IMAGE SPECT MULT: CPT | Mod: HCNC

## 2025-07-17 PROCEDURE — 63600175 PHARM REV CODE 636 W HCPCS: Mod: HCNC | Performed by: INTERNAL MEDICINE

## 2025-07-17 PROCEDURE — 99999 PR PBB SHADOW E&M-EST. PATIENT-LVL IV: CPT | Mod: PBBFAC,HCNC,, | Performed by: NURSE PRACTITIONER

## 2025-07-17 PROCEDURE — 93306 TTE W/DOPPLER COMPLETE: CPT | Mod: HCNC

## 2025-07-17 RX ORDER — REGADENOSON 0.08 MG/ML
0.4 INJECTION, SOLUTION INTRAVENOUS ONCE
Status: COMPLETED | OUTPATIENT
Start: 2025-07-17 | End: 2025-07-17

## 2025-07-17 RX ADMIN — TETROFOSMIN 33 MILLICURIE: 1.38 INJECTION, POWDER, LYOPHILIZED, FOR SOLUTION INTRAVENOUS at 10:07

## 2025-07-17 RX ADMIN — EPOETIN ALFA-EPBX 20000 UNITS: 20000 INJECTION, SOLUTION INTRAVENOUS; SUBCUTANEOUS at 08:07

## 2025-07-17 RX ADMIN — TETROFOSMIN 11.1 MILLICURIE: 1.38 INJECTION, POWDER, LYOPHILIZED, FOR SOLUTION INTRAVENOUS at 09:07

## 2025-07-17 RX ADMIN — REGADENOSON 0.4 MG: 0.08 INJECTION, SOLUTION INTRAVENOUS at 10:07

## 2025-07-17 NOTE — CONSULTS
[facility]  Response for E-Consult     Patient Name: Nadia Damon  MRN: [id number]  Primary Care Provider: Elis Wick MD   Requesting Provider: Huseyin Marx MD  E-Consult to General Cardiology  Consult performed by: Andrea Hernandez MD  Consult ordered by: Huseyin Marx MD          E consult for preop clearance of cataract  The chart reviewed.  PMH s/p heart Tx, cad    Plan  Elevated periop risk of CV events for non-high risk procedure.  Ok to proceed the scheduled procedure without further cardiac study.  OK to hold ASA 14 days before the procedure and resume postop once hemodynamically stable      Total time of Consultation: 10 minute    I did not speak to the requesting provider verbally about this.     *This eConsult is based on the clinical data available to me and is furnished without benefit of a physical examination. The eConsult will need to be interpreted in light of any clinical issues or changes in patient status not available to me at the time of filing this eConsults. Significant changes in patient condition or level of acuity should result in immediate formal consultation and reevaluation. Please alert me if you have further questions.    Thank you for this eConsult referral.     Andrea Hernandez MD  [facility]

## 2025-07-17 NOTE — PROGRESS NOTES
Subjective:       Patient ID: Nadia Damon is a 70 y.o. female.    Chief Complaint: review labs. Consideration of Retacrit    HPI: 70 y.o female with h/o of CVA 2024, heart transplant in  (on anti-rejection medication), CKD V, triple negative breast ca with lymph node involovement. Initiation of chemotherapy 2021 (Taxotere/Cytoxan) with Udenyca 2021. Unfortunately chemotherapy was complicated by pancytopenia and neutropenic fever resulting in hospitalization x 2. Patient decided on no further chemotherapy. S/p radiation completed 2022    10/31/2022 underwent right breast biopsy due to abnormal findings on mammogram with benign pathology. She continues follow up with breast cancer survivorship     Today she is here for lab review and consideration for EPO therapy for anemia of CKD. Interval back procedure with notable improvement in back pain.  Social History     Socioeconomic History    Marital status: Single    Number of children: 2    Highest education level: 11th grade   Occupational History    Occupation: Retired   Tobacco Use    Smoking status: Never     Passive exposure: Never    Smokeless tobacco: Never   Substance and Sexual Activity    Alcohol use: Never     Alcohol/week: 0.0 standard drinks of alcohol    Drug use: No    Sexual activity: Not Currently     Partners: Male     Birth control/protection: See Surgical Hx   Other Topics Concern    Are you pregnant or think you may be? No    Breast-feeding No   Social History Narrative    Single. 2 children , 1  at 31 yoa  2014 strep throat -  pneumonia and renal complications after not completing course of AB. Other child lives in [place], [place]. Has a cousin locally that could help in an emergency. 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 Caffeine intake =- 1 cola a day. No coffee, + occasional tea, avoids caffeine especially at night. Still drives. She does not have a Living Will or Advanced directive.       Social Drivers of Health     Financial Resource Strain: Low Risk  (5/17/2025)    Overall Financial Resource Strain (CARDIA)     Difficulty of Paying Living Expenses: Not hard at all   Recent Concern: Financial Resource Strain - Medium Risk (5/2/2025)    Overall Financial Resource Strain (CARDIA)     Difficulty of Paying Living Expenses: Somewhat hard   Food Insecurity: No Food Insecurity (5/17/2025)    Hunger Vital Sign     Worried About Running Out of Food in the Last Year: Never true     Ran Out of Food in the Last Year: Never true   Transportation Needs: No Transportation Needs (5/17/2025)    PRAPARE - Transportation     Lack of Transportation (Medical): No     Lack of Transportation (Non-Medical): No   Recent Concern: Transportation Needs - Unmet Transportation Needs (5/2/2025)    PRAPARE - Transportation     Lack of Transportation (Medical): Yes     Lack of Transportation (Non-Medical): No   Physical Activity: Inactive (5/17/2025)    Exercise Vital Sign     Days of Exercise per Week: 0 days     Minutes of Exercise per Session: 0 min   Stress: No Stress Concern Present (5/17/2025)    [employer] - Occupational Stress Questionnaire     Feeling of Stress : Only a little   Recent Concern: Stress - Stress Concern Present (5/13/2025)    [employer] - Occupational Stress Questionnaire     Feeling of Stress : To some extent   Housing Stability: High Risk (5/17/2025)    Housing Stability Vital Sign     Unable to Pay for Housing in the Last Year: No     Number of Times Moved in the Last Year: 4     Homeless in the Last Year: No       Past Medical History:   Diagnosis Date    Abdominal wall hernia     CT Renal 6/11/2018---Small fat containing superior ventral abdominal wall hernia at the epicardial pacing lead site.    Abnormal mammogram 10/12/2021    Acute cystitis without hematuria 05/10/2022    Acute ischemic right middle cerebral artery (MCA) stroke  07/20/2024    Adverse drug reaction 11/12/2024    GRACE (acute kidney injury) 11/22/2021    Anxiety     Aphasia 07/19/2024    Arthritis     ZEN HIPS    Breast cancer in female 08/2021    LEFT BREAST    Breast mass, right 11/13/2024    RIGHT BREAST MASS (Problem)      Bronchitis 08/18/2016    Never smoked      C. difficile colitis 11/29/2021    Cellulitis of axilla, left 12/23/2021    Chronic diastolic heart failure 12/16/2021    Chronic kidney disease     stage 4, GFR 15-29 ml/min    Chronic midline low back pain without sciatica 06/18/2018    CKD (chronic kidney disease) stage 4, GFR 15-29 ml/min 04/20/2016    US Retro   5/16/2018---Mild cortical thinning and increased cortical echogenicity.  Findings can be seen with medical renal disease.  8/31/216--- Echogenic kidneys with diffuse cortical thinning suggesting  medical renal disease. 2 complex right renal cortical cysts.      Closed nondisplaced fracture of distal phalanx of left great toe with routine healing 10/22/2018    Coronary artery disease 1993    heart transplant    COVID-19 in immunocompromised patient 02/26/2024    Cystitis 05/10/2022    Depression     Elevated troponin 05/13/2025    Encounter for blood transfusion     Encounter for palliative care 10/14/2024    Endotracheally intubated 04/28/2025    Fibromyalgia     on Lyrica    Heart failure     native heart cardiomyopathy    Heart transplanted 1993    due to cardiomyopathy    History of hyperparathyroidism; Hyperparathyroidism, secondary renal     PT DENIES    History of left breast cancer 2021    rad and chemo    Hypertension     Immune disorder     anti rejection meds    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    Impaired mobility 07/28/2022    Iron deficiency anemia 08/15/2017    Kidney stones     passed per pt    Lower extremity edema 12/11/2024    Multifactoral: CKD stage 5, CHF, venous insufficiency      Obesity     Obesity (BMI 30.0-34.9) 07/22/2019    Other osteoporosis  without current pathological fracture 08/30/2019    Other pancytopenia 05/06/2024    Palliative care encounter 04/30/2025    Parotitis, acute 09/19/2023    Pharyngitis 01/09/2019    Pneumonia due to infectious organism 03/14/2024    PONV (postoperative nausea and vomiting)     Rapid palpitations 10/16/2024    Severe sepsis 11/22/2021    Shingles 2003 approx    left leg    Stage 4 chronic kidney disease 11/22/2021    Subclinical hypothyroidism 06/16/2023    Syncope 05/13/2025    Thrombocytopenia, unspecified 11/29/2021    Trouble in sleeping     Unresponsiveness 11/13/2024    Urinary incontinence        Family History   Problem Relation Name Age of Onset    Cancer Mother  38        breast    Breast cancer Mother      Breast cancer Maternal Grandmother      Heart disease Maternal Grandmother      Hypertension Son      Cataracts Cousin      Diabetes Neg Hx      Stroke Neg Hx      Kidney disease Neg Hx      Asthma Neg Hx      COPD Neg Hx      Melanoma Neg Hx      Hyperlipidemia Neg Hx         Past Surgical History:   Procedure Laterality Date    ABLATION OF MEDIAL BRANCH NERVE OF LUMBAR SPINE FACET JOINT Bilateral 6/23/2025    Procedure: Bilateral L4-5 and L5-S1 RFA;  Surgeon: Jassi Pierre MD;  [facility];  Service: Pain Management;  Laterality: Bilateral;    bladder implant Right 06/11/2024    BLADDER SURGERY  2015 approx    mesh - Dr Everett then 2nd reconstructive sx Dr Onofre    BREAST BIOPSY Bilateral     NEGATIVE    BREAST BIOPSY Right 10/31/2022    benign    BREAST LUMPECTOMY Left 2021    rad and chemo    BREAST SURGERY Left 09/28/2015    Bx - benign    BREAST SURGERY Right 12/2015    Bx benign    CARDIAC PACEMAKER REMOVAL Left 06/26/2014    Pacer defirillator removed. Put in 1993 aat time of heart transplant    CARPAL TUNNEL RELEASE Left 03/03/2015    Dr. Hall    COLONOSCOPY N/A 02/25/2021    Procedure: COLONOSCOPY;  Surgeon: Freida Ramirez MD;  Location: [facility];  Service: Endoscopy;   Laterality: N/A;    CYSTOCELE REPAIR      Twice with mesh removal    ECHOCARDIOGRAM,TRANSESOPHAGEAL N/A 04/26/2025    Procedure: Transesophageal echo (AYAAN) intra-procedure log documentation;  Surgeon: Barron Chinchilla MD;  Location: [address];  Service: Cardiothoracic;  Laterality: N/A;    EPIDURAL STEROID INJECTION INTO CERVICAL SPINE N/A 02/02/2023    Procedure: T11/T12 IL HELLEN;  Surgeon: Jassi Pierre MD;  Location: [facility];  Service: Pain Management;  Laterality: N/A;    EPIDURAL STEROID INJECTION INTO CERVICAL SPINE N/A 09/16/2024    Procedure: T11/T12 IL HELLEN;  Surgeon: Jassi Pierre MD;  Location: [facility];  Service: Pain Management;  Laterality: N/A;    HEART TRANSPLANT  1993    HERNIA REPAIR Right 1971 approx    Inguinal    HYSTERECTOMY  1983    vag hyst /LSO     IMPLANTATION OF PERMANENT SACRAL NERVE STIMULATOR N/A 06/11/2024    Procedure: INSERTION, NEUROSTIMULATOR, PERMANENT, SACRAL;  Surgeon: Sami Cochran MD;  Location: [facility];  Service: Urology;  Laterality: N/A;    INCISION AND DRAINAGE OF ABSCESS Left 12/24/2021    Procedure: INCISION AND DRAINAGE, ABSCESS;  Surgeon: Joseph Longo MD;  Location: [facility] OR;  Service: General;  Laterality: Left;    INJECTION OF ANESTHETIC AGENT AROUND MEDIAL BRANCH NERVES INNERVATING LUMBAR FACET JOINT Right 10/19/2022    Procedure: Right L4/L5 and L5/S1 MBB;  Surgeon: Jassi Pierre MD;  Location: [facility];  Service: Pain Management;  Laterality: Right;    INJECTION OF ANESTHETIC AGENT AROUND MEDIAL BRANCH NERVES INNERVATING LUMBAR FACET JOINT Right 11/09/2022    Procedure: Right L4/L5 and L5/S1 MBB;  Surgeon: Jassi Pierre MD;  Location: [facility];  Service: Pain Management;  Laterality: Right;    INJECTION OF ANESTHETIC AGENT AROUND MEDIAL BRANCH NERVES INNERVATING LUMBAR FACET JOINT Left 02/06/2025    Procedure: Left L4-5 and L5-S1 MBB #1 with local;  Surgeon: Jassi Pierre MD;  Location: [facility];   Service: Pain Management;  Laterality: Left;    INJECTION OF ANESTHETIC AGENT AROUND MEDIAL BRANCH NERVES INNERVATING LUMBAR FACET JOINT Left 03/19/2025    Procedure: Left L4-5 and L5-S1 MBB #2 with local;  Surgeon: Jassi Pierre MD;  Location: [facility];  Service: Pain Management;  Laterality: Left;    INJECTION OF ANESTHETIC AGENT INTO SACROILIAC JOINT Right 08/22/2022    Procedure: Right SIJ Injection Right L5/S1 Facte Injection;  Surgeon: Jassi Pierre MD;  Location: [facility];  Service: Pain Management;  Laterality: Right;    INSERTION OF TUNNELED CENTRAL VENOUS CATHETER (CVC) WITH SUBCUTANEOUS PORT N/A 11/09/2021    Procedure: [id number];  Surgeon: Christoph Douglas MD;  Location: [facility] OR;  Service: General;  Laterality: N/A;    RADIOFREQUENCY ABLATION Right 12/05/2024    Procedure: Right L4-5 and L5-S1 RFA;  Surgeon: Jassi Pierre MD;  Location: [facility];  Service: Pain Management;  Laterality: Right;    RADIOFREQUENCY THERMOCOAGULATION Right 12/07/2022    Procedure: Right L4/L5 and L5/S1 Lumbar RFA;  Surgeon: Jassi Pierre MD;  Location: [facility];  Service: Pain Management;  Laterality: Right;    REMOVAL OF ELECTRODE LEAD OF SACRAL NERVE STIMULATOR N/A 02/18/2025    Procedure: REMOVAL, ELECTRODE LEAD, SACRAL NERVE STIMULATOR;  Surgeon: Sami Cochran MD;  Location: [facility] OR;  Service: Urology;  Laterality: N/A;    REMOVAL OF VASCULAR ACCESS PORT      ROBOT-ASSISTED LAPAROSCOPIC ABDOMINAL SACROCOLPOPEXY N/A 08/10/2023    Procedure: ROBOTIC SACROCOLPOPEXY, ABDOMEN;  Surgeon: PRANAY Villalobos MD;  Location: [facility] OR;  Service: OB/GYN;  Laterality: N/A;    ROBOT-ASSISTED LAPAROSCOPIC OOPHORECTOMY Right 08/10/2023    Procedure: ROBOTIC OOPHORECTOMY;  Surgeon: PRANAY Villalobos MD;  Location: [facility] OR;  Service: OB/GYN;  Laterality: Right;    SENTINEL LYMPH NODE BIOPSY Left 10/12/2021    Procedure: BIOPSY, LYMPH NODE, SENTINEL;  Surgeon: Christoph Douglas MD;   Location: [facility] OR;  Service: General;  Laterality: Left;    TOE SURGERY      XI ROBOTIC URETHROPEXY N/A 08/10/2023    Procedure: XI ROBOTIC URETHROPEXY;  Surgeon: PRANAY Villalobos MD;  Location: [facility] OR;  Service: OB/GYN;  Laterality: N/A;       Review of Systems   Constitutional:  Negative for activity change, appetite change, chills, fatigue, fever and unexpected weight change.   HENT:  Negative for congestion, mouth sores, nosebleeds, sore throat, trouble swallowing and voice change.    Eyes:  Negative for visual disturbance.   Respiratory:  Negative for cough, chest tightness, shortness of breath and wheezing.    Cardiovascular:  Negative for chest pain, palpitations and leg swelling.   Gastrointestinal:  Negative for abdominal distention, abdominal pain, anal bleeding, blood in stool, constipation, diarrhea, nausea and vomiting.   Genitourinary:  Negative for difficulty urinating, dysuria and hematuria.   Musculoskeletal:  Positive for back pain (sees pain management). Negative for arthralgias and myalgias. Gait problem: utizlies cane, s/p CVA 7/2024.       Breast pain s/p mammogram   Skin:  Negative for pallor, rash and wound.   Neurological:  Negative for dizziness, syncope, weakness and headaches.   Hematological:  Negative for adenopathy. Does not bruise/bleed easily.   Psychiatric/Behavioral:  The patient is not nervous/anxious.          Medication List with Changes/Refills   Current Medications    ASPIRIN (ECOTRIN) 81 MG EC TABLET    Take 1 tablet (81 mg total) by mouth once daily.    CALCITRIOL (ROCALTROL) 0.25 MCG CAP    Take 1 capsule (0.25 mcg total) by mouth once daily.    CARVEDILOL (COREG) 3.125 MG TABLET    TAKE 1 TABLET TWICE DAILY WITH MEALS    CYCLOSPORINE (NEORAL) 100 MG/ML MICROEMULSION SOLUTION    Take 0.5 mLs (50 mg total) by mouth every 12 (twelve) hours.    FUROSEMIDE (LASIX) 40 MG TABLET    Take ½ tablet (20 mg total) by mouth once daily.    ISOSORBIDE MONONITRATE (IMDUR) 60 MG 24 HR  TABLET    Take 1 tablet (60 mg total) by mouth once daily.    LEVOTHYROXINE (SYNTHROID) 25 MCG TABLET    Take 1 tablet (25 mcg total) by mouth before breakfast.    LIDOCAINE (LIDODERM) 5 %    Place 1 patch onto the skin daily as needed (RIb Pain). Place patch over the affected area for up to 12 hours.  Please wait 12 hours before placing a new patch.    NIFEDIPINE (PROCARDIA-XL) 60 MG (OSM) 24 HR TABLET    Take 1 tablet (60 mg total) by mouth 2 (two) times a day.    NITROGLYCERIN (NITROSTAT) 0.4 MG SL TABLET    PLACE 1 TABLET UNDER THE TONGUE EVERY 5 MINS AS NEEDED FOR CHEST PAIN FOR UP TO 3 DOSES.IF CONTINUED HEART PAIN/PRESSURE AFTER    ONDANSETRON (ZOFRAN) 4 MG TABLET    TAKE 1 TABLET EVERY 12 HOURS AS NEEDED FOR NAUSEA    PANTOPRAZOLE (PROTONIX) 40 MG TABLET    Take 1 tablet (40 mg total) by mouth once daily.    POLYETHYLENE GLYCOL (GLYCOLAX) 17 GRAM PWPK    Take 17 g by mouth once daily.    PREDNISOLONE ACETATE (PRED FORTE) 1 % DRPS    Place 1 drop into the right eye 4 (four) times daily.    PREGABALIN (LYRICA) 50 MG CAPSULE    Take 1 capsule (50 mg total) by mouth every evening.    ROSUVASTATIN (CRESTOR) 5 MG TABLET    Take 1 tablet (5 mg total) by mouth once daily.    SERTRALINE (ZOLOFT) 25 MG TABLET    Take 1 tablet (25 mg total) by mouth once daily.    SODIUM BICARBONATE 650 MG TABLET    Take 1 tablet (650 mg total) by mouth 2 (two) times daily.    TEMAZEPAM (RESTORIL) 30 MG CAPSULE    Take 1 capsule (30 mg total) by mouth nightly as needed for Insomnia.    TRAZODONE (DESYREL) 50 MG TABLET    Take 1 tablet (50 mg total) by mouth nightly as needed for Insomnia.    VITAMIN E 400 UNIT CAPSULE    Take 400 Units by mouth once daily.     Objective:     Vitals:    07/17/25 0814   BP: 114/68   Pulse: 84   Temp: 97.1 °F (36.2 °C)     Lab Results   Component Value Date    WBC 2.62 (L) 07/17/2025    HGB 8.7 (L) 07/17/2025    HCT 26.5 (L) 07/17/2025    MCV 90 07/17/2025     07/17/2025       BMP  Lab Results    Component Value Date     07/03/2025    K 3.7 07/03/2025     07/03/2025    CO2 17 (L) 07/03/2025    BUN 54 (H) 07/03/2025    CREATININE 3.8 (H) 07/03/2025    CALCIUM 9.0 07/03/2025    ANIONGAP 17 (H) 07/03/2025    ESTGFRAFRICA 19 (A) 07/14/2022    EGFRNONAA 17 (A) 07/14/2022     Lab Results   Component Value Date    ALT 28 05/18/2025    AST 39 05/18/2025    ALKPHOS 165 (H) 05/18/2025    BILITOT 0.4 05/18/2025     Lab Results   Component Value Date    IRON 74 06/16/2025    TIBC 360 06/16/2025    FERRITIN 116.0 06/16/2025       Physical Exam  Vitals reviewed.   Constitutional:       Appearance: She is well-developed.   HENT:      Head: Normocephalic.      Right Ear: External ear normal.      Left Ear: External ear normal.   Eyes:      General: Lids are normal. No scleral icterus.        Right eye: No discharge.         Left eye: No discharge.      Conjunctiva/sclera: Conjunctivae normal.   Neck:      Thyroid: No thyroid mass.   Cardiovascular:      Rate and Rhythm: Normal rate and regular rhythm.      Heart sounds: Normal heart sounds.   Pulmonary:      Effort: Pulmonary effort is normal. No respiratory distress.   Abdominal:      General: Bowel sounds are normal. There is no distension.      Palpations: Abdomen is soft.   Genitourinary:     Comments: deferred  Musculoskeletal:         General: Normal range of motion.      Cervical back: Normal range of motion.      Comments: Mild BLE edema   Skin:     General: Skin is warm and dry.   Neurological:      Mental Status: She is alert and oriented to person, place, and time.   Psychiatric:         Speech: Speech normal.         Behavior: Behavior normal. Behavior is cooperative.         Thought Content: Thought content normal.          Assessment:     Problem List Items Addressed This Visit          Oncology    Anemia associated with stage 5 chronic renal failure - Primary (Chronic)    -Hg 8.7. Most recent iron levels adequate    Proceed with Retacrit today.  Repeat in 2 and 4 weeks. F/u 6 weeks with CBC iron ferritin for consideration of Retacrit if hg <10         Relevant Orders    CBC Auto Differential    Basic Metabolic Panel    Iron and TIBC    Ferritin    Ductal carcinoma in situ (DCIS) of left breast    Continue f/u with breast cancer survivorship              Plan:     Anemia associated with stage 5 chronic renal failure  -     CBC Auto Differential; Future; Expected date: 07/17/2025  -     Basic Metabolic Panel; Future; Expected date: 07/17/2025  -     Iron and TIBC; Future; Expected date: 07/17/2025  -     Ferritin; Future; Expected date: 07/17/2025    Ductal carcinoma in situ (DCIS) of left breast      Route Chart for Scheduling    Med Onc Chart Routing      Follow up with physician    Follow up with LIA 6 weeks.   Infusion scheduling note    Injection scheduling note retacrit 2 and 4 weeks. possible retacrit 6 weeks   Labs CBC, ferritin and iron and TIBC   Scheduling:  Preferred lab:  Lab interval:     Imaging None      Pharmacy appointment No pharmacy appointment needed      Other referrals No nutrition appointment needed -        No additional referrals needed         Therapy Plan Information  INF EPOETIN TRISTAN (RETACRIT) INITIAL DOSES for Osteopenia of lumbar spine, noted on 8/30/2019  INF EPOETIN TRISTAN (RETACRIT) INITIAL DOSES for Anemia associated with stage 5 chronic renal failure, noted on 11/19/2024  epoetin tristan-epbx injection 20,000 Units  20,000 Units, Subcutaneous, PRN      No therapy plan of the specified type found.    No therapy plan of the specified type found.          ABELARDO العراقي

## 2025-07-17 NOTE — ASSESSMENT & PLAN NOTE
-Hg 8.7. Most recent iron levels adequate    Proceed with Retacrit today. Repeat in 2 and 4 weeks. F/u 6 weeks with CBC iron ferritin for consideration of Retacrit if hg <10

## 2025-07-19 NOTE — ASSESSMENT & PLAN NOTE
Nervous about upcoming cardiac stress test - encouraged to follow through as scheduled and to not hesitate to insist they stop if sig discomfort develops during test

## 2025-07-19 NOTE — ASSESSMENT & PLAN NOTE
Given continued gradual increase of TSH, low dose levothyroxine was added last month (following e-consult to Endo per pt preference/request) - repeat TFTs in August

## 2025-07-21 ENCOUNTER — TELEPHONE (OUTPATIENT)
Dept: PRIMARY CARE CLINIC | Facility: CLINIC | Age: 70
End: 2025-07-21
Payer: MEDICARE
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
## 2025-07-21 NOTE — TELEPHONE ENCOUNTER
PC with pt- states she had stress test last week and has been having pressure to chest- pt declines going to ED- states that she does not think she needs to go to ED - appt made for tomorrow with Dr. Wick.  Advised pt that due to her medical history I think she should go get checked out at ED - pt refused.

## 2025-07-22 ENCOUNTER — OFFICE VISIT (OUTPATIENT)
Dept: PRIMARY CARE CLINIC | Facility: CLINIC | Age: 70
End: 2025-07-22
Payer: MEDICARE

## 2025-07-22 VITALS
HEIGHT: 62 IN | HEART RATE: 87 BPM | BODY MASS INDEX: 26.69 KG/M2 | DIASTOLIC BLOOD PRESSURE: 70 MMHG | TEMPERATURE: 98 F | WEIGHT: 145.06 LBS | SYSTOLIC BLOOD PRESSURE: 140 MMHG | OXYGEN SATURATION: 98 %

## 2025-07-22 DIAGNOSIS — D84.821 IMMUNOCOMPROMISED STATE DUE TO DRUG THERAPY: ICD-10-CM

## 2025-07-22 DIAGNOSIS — R05.1 ACUTE COUGH: ICD-10-CM

## 2025-07-22 DIAGNOSIS — J02.9 PHARYNGITIS, UNSPECIFIED ETIOLOGY: ICD-10-CM

## 2025-07-22 DIAGNOSIS — Z79.899 IMMUNOCOMPROMISED STATE DUE TO DRUG THERAPY: ICD-10-CM

## 2025-07-22 DIAGNOSIS — F41.8 DEPRESSION WITH ANXIETY: Primary | ICD-10-CM

## 2025-07-22 DIAGNOSIS — R07.9 CHEST PAIN, UNSPECIFIED TYPE: Primary | Chronic | ICD-10-CM

## 2025-07-22 DIAGNOSIS — D64.9 NORMOCYTIC ANEMIA: Chronic | ICD-10-CM

## 2025-07-22 DIAGNOSIS — N18.4 CKD (CHRONIC KIDNEY DISEASE), STAGE IV: Chronic | ICD-10-CM

## 2025-07-22 LAB
CTP QC/QA: YES
OHS QRS DURATION: 166 MS
OHS QTC CALCULATION: 552 MS
SARS-COV+SARS-COV-2 AG RESP QL IA.RAPID: NEGATIVE

## 2025-07-22 PROCEDURE — 99499 UNLISTED E&M SERVICE: CPT | Mod: HCNC,S$GLB,,

## 2025-07-22 PROCEDURE — 3066F NEPHROPATHY DOC TX: CPT | Mod: CPTII,HCNC,S$GLB, | Performed by: INTERNAL MEDICINE

## 2025-07-22 PROCEDURE — 3008F BODY MASS INDEX DOCD: CPT | Mod: CPTII,HCNC,S$GLB, | Performed by: INTERNAL MEDICINE

## 2025-07-22 PROCEDURE — 99999 PR PBB SHADOW E&M-EST. PATIENT-LVL V: CPT | Mod: PBBFAC,HCNC,, | Performed by: INTERNAL MEDICINE

## 2025-07-22 PROCEDURE — 87811 SARS-COV-2 COVID19 W/OPTIC: CPT | Mod: QW,HCNC,S$GLB, | Performed by: INTERNAL MEDICINE

## 2025-07-22 PROCEDURE — 3288F FALL RISK ASSESSMENT DOCD: CPT | Mod: CPTII,HCNC,S$GLB, | Performed by: INTERNAL MEDICINE

## 2025-07-22 PROCEDURE — 1125F AMNT PAIN NOTED PAIN PRSNT: CPT | Mod: CPTII,HCNC,S$GLB, | Performed by: INTERNAL MEDICINE

## 2025-07-22 PROCEDURE — 1101F PT FALLS ASSESS-DOCD LE1/YR: CPT | Mod: CPTII,HCNC,S$GLB, | Performed by: INTERNAL MEDICINE

## 2025-07-22 PROCEDURE — 99215 OFFICE O/P EST HI 40 MIN: CPT | Mod: HCNC,S$GLB,, | Performed by: INTERNAL MEDICINE

## 2025-07-22 PROCEDURE — 3077F SYST BP >= 140 MM HG: CPT | Mod: CPTII,HCNC,S$GLB, | Performed by: INTERNAL MEDICINE

## 2025-07-22 PROCEDURE — 93005 ELECTROCARDIOGRAM TRACING: CPT | Mod: HCNC,S$GLB,, | Performed by: INTERNAL MEDICINE

## 2025-07-22 PROCEDURE — 93010 ELECTROCARDIOGRAM REPORT: CPT | Mod: HCNC,S$GLB,, | Performed by: INTERNAL MEDICINE

## 2025-07-22 PROCEDURE — 3078F DIAST BP <80 MM HG: CPT | Mod: CPTII,HCNC,S$GLB, | Performed by: INTERNAL MEDICINE

## 2025-07-22 PROCEDURE — 1157F ADVNC CARE PLAN IN RCRD: CPT | Mod: CPTII,HCNC,S$GLB, | Performed by: INTERNAL MEDICINE

## 2025-07-22 RX ORDER — TRAZODONE HYDROCHLORIDE 100 MG/1
100 TABLET ORAL NIGHTLY PRN
Qty: 90 TABLET | Refills: 0 | Status: SHIPPED | OUTPATIENT
Start: 2025-07-22 | End: 2025-07-22

## 2025-07-22 RX ORDER — TRAZODONE HYDROCHLORIDE 100 MG/1
100 TABLET ORAL NIGHTLY PRN
Qty: 90 TABLET | Refills: 0 | Status: SHIPPED | OUTPATIENT
Start: 2025-07-22 | End: 2025-10-21

## 2025-07-22 NOTE — PROGRESS NOTES
Nadia Damon  07/22/2025  [id number]    Elis Wick MD  Patient Care Team:  Elis Wick MD as PCP - General (Internal Medicine)  Miguel Soni Jr., MD as Consulting Physician (Vascular Surgery)  Ike King MD as Consulting Physician (Cardiology)  Courtney Tubbs MD as Consulting Physician (Cardiology)  Carter Crawford MD as Consulting Physician (Nephrology)  Paxton Vasques OD as Consulting Physician (Optometry)  PRANAY Villalobos MD as Obstetrician (Obstetrics)  Ike King MD as Consulting Physician (Cardiology)  Jose Roland MD as Consulting Physician (Dermatology)  Praasnth Johnson MD as Consulting Physician (Rheumatology)  Elis Wick MD as Physician (Internal Medicine)  Lexi Bianchi LCSW as  (Hematology and Oncology)  Kelsie Burk PA-C as Physician Assistant (Hematology and Oncology)  Chelsea Monte as ED Navigator  Anna Regalado LMSW as  (Hematology and Oncology)    Visit Type: Follow-up on c/o chest tightness, hoarseness    Ms. Damon is a 69 year old female here w c/o chest tightness, hoarseness    Ms. Damon w h/o heart transplant 1993, on anti-rejection medication.   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   She was treated with 1 cycle of systemic chemotherapy cytoxan and taxotere and udenyca that was discontinued due to toxicity.   She was followed by radiation oncology and completed last radiation treatment 5/14/22.   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       Ms. Damon suffered CVA 7/19/24 with subsequent hospitalization then transfer to [facility] to address on-going dysarthria and R-sided weakness. She also has severe multi-level spinal stenosis for which she is followed by Pain Management.     Other chronic medical issues  "include depresssion/anxiety/insomnia, hypertension, chronic diastolic CHF, ventral abdominal hernia, neurogenic bladder, and h/o osteoporosis for which she had been receiving Prolia.      Completed Nuclear Cardiac stress test 7/17/25    Normal myocardial perfusion scan. There is no evidence of myocardial ischemia or infarction.    There is a mild to moderate intensity perfusion abnormality in the anterior wall of the left ventricle, secondary to breast attenuation.    The gated perfusion images showed an ejection fraction of 90% at rest. The gated perfusion images showed an ejection fraction of 91% post stress. Normal ejection fraction is greater than 59%.    There is normal wall motion at rest and post-stress.    LV cavity size is normal at rest and normal at post-stress.    The ECG portion of the study is negative for ischemia.    Echocardiogram 7/17/25    Left Ventricle: The left ventricle is normal in size. Moderately increased wall thickness. There is concentric remodeling. There is normal systolic function. Ejection fraction is approximately 60%.    Right Ventricle: The right ventricle is normal in size measuring 3.0 cmWall thickness is normal. . Systolic function is normal.    Left Atrium: The left atrium is moderately dialted.    Tricuspid Valve: There is mild regurgitation with a centrally directed jet.    Pulmonary Artery: The estimated pulmonary artery systolic pressure is 32 mmHg.    IVC/SVC: Normal venous pressure at 3 mmHg.    History of Present Illness    CHIEF COMPLAINT:  Ms. Damon presents today for follow up after stress test and reports persistent chest pain and fatigue since completing stress test. She has not yet heard back from Cardiology regarding results of the stress test. I suspect this is exacerbating her general anxiety.    CARDIAC:  She reports ongoing left-sided chest tightness and chest discomfort described as feeling like a "heartache" that limits her normal function. The " discomfort is intermittent and not prolonged. She is currently taking imdur and also took one SL nitro this morning. Reviewed w Ms. Damon recent cardiology study reports. To my understanding, reports are overall quite positive/reassuring.     POST-STRESS TEST SYMPTOMS:  She developed a sore throat after spending approximately 6 hours at the medical facility for the stress test. She experienced a constant cough and felt unwell this past  weekend following the procedure. She has been using honey tea and lozenges for throat discomfort. She denies significant shortness of breath.    CURRENT SYMPTOMS:  She reports right lower back discomfort and significant fatigue, noting difficulty completing tasks. Her recent cough has improved. She had a headache a couple days ago that has resolved. She reports morning nausea and feeling tired easily.    FUNCTIONAL STATUS:  She is receiving home physical and occupational therapy. Aides assist with walking in morning and afternoon. She reports significant functional decline with increased fatigue during basic activities such as bathing. She has been unable to complete recent walking sessions and notes reduced mobility compared to prior status.    MEDICATIONS:  She takes Trazodone 100 mg (recently increased from 50 mg) and Temazepam for sleep. She drinks calming tea as a sleep aid. Aspirin is currently on hold in preparation for upcoming right eye surgery.    UPCOMING SURGERY:  She has scheduled bilateral cataract surgery, with initial procedure planned for the right eye which is more significantly affected.    ROS:  General: -fever, -chills, +fatigue, -weight gain, -weight loss  Eyes: -vision changes, -redness, -discharge  ENT: -ear pain, +nasal congestion, +sore throat  Cardiovascular: +chest pain, -palpitations, -lower extremity edema, +chest tightness, +chest pain with exertion  Respiratory: +cough, -shortness of breath  Gastrointestinal: -abdominal pain, -nausea, -vomiting,  -diarrhea, -constipation, -blood in stool  Genitourinary: -dysuria, -hematuria, -frequency  Musculoskeletal: -joint pain, -muscle pain  Skin: -rash, -lesion  Neurological: -headache, -dizziness, -numbness, -tingling  Psychiatric: -anxiety, -depression, -sleep difficulty        PHQ-4 Score: 0     From LOV w me 7/08/25  Ms. Damon reports home SBP up to 170   Pulse often up into low 100's  Occasional low BP's 98/60 101/70  HH OT completed  SN still coming once a week   Sad due to recent passing of her friend and neighbor Ms. Arina Barajas (who was also O65+ pt).  CHIEF COMPLAINT:  Ms. Damon presents today for follow up after nerve ablation procedure.  PAIN MANAGEMENT:  She reports significant improvement following bilateral nerve ablation at L4-5 and L5-S1 levels, with minimal right-sided discomfort currently. She denies any recent falls.  BLOOD PRESSURE:  She reports blood pressure fluctuations ranging from 98/60 to 160/70, correlating with Coreg timing which she takes twice daily. She experiences occasional dizziness with positional changes. She monitors blood pressure at home with weekly home health nursing visits.  SLEEP:  She reports significant sleep disturbances including nocturnal wandering at 2-3 AM. She prefers sleeping in a chair despite caregiver attempts to improve bed comfort with reclining support and back pillow. Current sleep medication Temazepam is ineffective, as were previous trials of melatonin, sedating antihistamines, sleepytime tea, and Trazodone. She has difficulty initiating and maintaining sleep, with occasional unplanned daytime rest periods when exhausted.  LOWER EXTREMITY EDEMA:  She reports persistent bilateral lower extremity edema with significant ankle and leg swelling that does not completely resolve. She uses compression socks when going out and elevates legs at home for partial relief. The swelling increases with activity and improves somewhat after rest and  elevation.  FUNCTIONAL STATUS:  She has difficulty with walking due to leg pain and fatigue, causing occasional stumbling. She requires assistance with household tasks including mopping, washing, and cooking. She notes some improvement in overall energy levels despite continued functional limitations.  MEDICAL HISTORY:  She has a history of heart transplant and osteoporosis. Dexa scan from last summer showed stable hip density and slightly reduced vertebral density. 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  ROS:  General: -fever, -chills, +fatigue, -weight gain, -weight loss  ENT: -ear pain, -nasal congestion, -sore throat, +hoarseness  Cardiovascular: -chest pain, -palpitations, +lower extremity edema, +feelings of fast heart rate  Respiratory: -cough, -shortness of breath, +productive cough  Gastrointestinal: -abdominal pain, -nausea, -vomiting, -diarrhea, +constipation, -blood in stool  Musculoskeletal: -joint pain, -muscle pain, +limb pain, +abnormal gait  Neurological: -headache, +dizziness, -numbness, -tingling, +difficulty staying asleep, +sleep disturbances  Psychiatric: -anxiety, -depression, +sleep difficulty   PHQ-4 Score: 0      Hosp/ED/Urgent Care:  7/17/25 Hosp Nuclear Cardiac stress test    Recent appointments:   7/17/25 Heme/Onc Kelly anemia CKD5 epo 20,000 u  7/15/25 Ophthal Francisco [facility] cataract    Upcoming appointments:   Next 10 Appointments       Date Provider Specialty Appt Notes    7/22/2025 Génesis Gonzales LCSW Primary Care NEEDS LYFT - LCSW    7/23/2025  Lab Immunocompromised state due to drug therapy [D84.821, Z79.899]; Normocytic anemia [D64.9]    7/23/2025  Radiology Acute cough [R05.1]; Chest pain, unspecified type [R07.9]; Immunocompromised state due to drug therapy [D84.821, Z79.899]    7/23/2025 Prasanth Johnson MD Rheumatology OP    7/30/2025  "Jassi Pierre MD Pain Medicine p inj f/u    7/31/2025  Chemotherapy Retacrit ow/cbd    8/5/2025  Lab Acquired hypothyroidism [E03.9]    8/5/2025 Elis Wick MD Primary Care 2 WEEK F/U    8/7/2025 Huseyin Marx MD Ophthalmology 1 day post op    8/14/2025 Huseyin Marx MD Ophthalmology 1 week post op         The following were reviewed: Active problem list, medication list, allergies, family history, social history, and Health Maintenance.     Medications have been reviewed and reconciled with patient at visit today.    Exam: sat 98%  Vitals:    07/22/25 0910   BP: (!) 140/70   Pulse: 87   Temp: 98 °F (36.7 °C)     Weight: 65.8 kg (145 lb 1 oz)   Body mass index is 26.53 kg/m².     BP Readings from Last 3 Encounters:   07/22/25 (!) 140/70   07/17/25 139/68   07/17/25 114/68      Wt Readings from Last 3 Encounters:   07/22/25 0910 65.8 kg (145 lb 1 oz)   07/17/25 1059 66.2 kg (146 lb)   07/17/25 0814 66.4 kg (146 lb 6.2 oz)     Physical Exam  Vitals reviewed.   Constitutional:       General: She is not in acute distress.     Appearance: Normal appearance.   HENT:      Head: Normocephalic and atraumatic.      Right Ear: External ear normal.      Left Ear: External ear normal.      Nose: Nose normal.      Comments: Nasal mucosa appears edematous.     Mouth/Throat:      Mouth: Mucous membranes are moist.      Pharynx: Oropharynx is clear. Posterior oropharyngeal erythema present. No oropharyngeal exudate.   Eyes:      General: No scleral icterus.     Extraocular Movements: Extraocular movements intact.      Conjunctiva/sclera: Conjunctivae normal.   Cardiovascular:      Rate and Rhythm: Normal rate and regular rhythm.      Comments: Hyperdynamic  Elevated JVP  Pulmonary:      Effort: Pulmonary effort is normal. No respiratory distress.      Breath sounds: No wheezing.      Comments: "Crackles" at base right lower lobe  Abdominal:      General: Bowel sounds are normal. There is no distension.      " Palpations: Abdomen is soft.      Tenderness: There is no abdominal tenderness. There is no guarding.   Musculoskeletal:      Right lower leg: Edema present.      Left lower leg: Edema present.      Comments: trace   Skin:     General: Skin is warm and dry.      Findings: No rash.   Neurological:      Mental Status: She is alert. Mental status is at baseline.      Gait: Gait abnormal.      Comments: Ambulates w rollator for support   Psychiatric:         Behavior: Behavior normal.      Comments: Appears tired, fatigued, discouraged, a bit anxious        Laboratory Reviewed  Lab Results   Component Value Date    WBC 3.61 (L) 07/23/2025    HGB 9.0 (L) 07/23/2025    HCT 29.0 (L) 07/23/2025     07/23/2025    MCV 93 07/23/2025    CHOL 196 04/25/2025    TRIG 192 (H) 04/25/2025    HDL 59 04/25/2025    LDLCALC 98.6 04/25/2025    ALT 27 07/23/2025    AST 43 07/23/2025     07/23/2025    K 4.6 07/23/2025     07/23/2025    CREATININE 3.1 (H) 07/23/2025    BUN 31 (H) 07/23/2025    CO2 25 07/23/2025    MG 2.1 07/03/2025    TSH 17.189 (H) 06/16/2025    FREET4 0.93 06/16/2025    PSA <0.1 05/27/2008    INR 0.9 05/07/2025    HGBA1C 5.1 07/02/2024    CRP 14.4 (H) 08/24/2023     Lab Results   Component Value Date    .3 (H) 03/25/2025    CALCIUM 9.0 07/23/2025    CAION 1.13 09/05/2018    PHOS 3.9 05/18/2025      Lab Results   Component Value Date    VIAEBEVH77 1,254 (H) 05/17/2025     Lab Results   Component Value Date    FOLATE 16.7 05/17/2025      Lab Results   Component Value Date    IRON 74 06/16/2025    TRANSFERRIN 243 06/16/2025    TIBC 360 06/16/2025    LABIRON 21 06/16/2025    FESATURATED 20 02/24/2025      Lab Results   Component Value Date    EGFRNORACEVR 16 (L) 07/23/2025    ALBUMIN 3.5 07/23/2025     (H) 05/12/2025     Lab Results   Component Value Date    VGVRLWYD35DW 50 03/25/2025 7/22/25 POC CoVID neg    ECG 7/22/25 Normal sinus rhythm   Possible Left atrial enlargement   Right bundle  branch block   Left anterior fascicular block    Bifascicular block   T wave abnormality, consider lateral ischemia   Abnormal ECG   When compared with ECG of 17-Jul-2025 10:56,   T wave inversion now evident in Inferior leads   ST depression more prominent     Assessment:   70 y.o. female with multiple co-morbid illnesses here for continued follow up of medical problems.      The primary encounter diagnosis was Chest pain, unspecified type. Diagnoses of Pharyngitis, unspecified etiology, Acute cough, CKD (chronic kidney disease), stage IV, Immunocompromised state due to drug therapy, and Normocytic anemia were also pertinent to this visit.      Plan:   1. Chest pain, unspecified type  -     IN OFFICE EKG 12-LEAD (to Muse)  -     X-Ray Chest PA And Lateral; Future; Expected date: 07/23/2025  -     BNP; Future; Expected date: 07/23/2025    2. Pharyngitis, unspecified etiology  -     SARS Coronavirus 2 Antigen, POCT Manual Read    3. Acute cough  -     SARS Coronavirus 2 Antigen, POCT Manual Read  -     X-Ray Chest PA And Lateral; Future; Expected date: 07/23/2025  -     BNP; Future; Expected date: 07/23/2025    4. CKD (chronic kidney disease), stage IV  -     Comprehensive Metabolic Panel; Future; Expected date: 07/23/2025  -     BNP; Future; Expected date: 07/23/2025    5. Immunocompromised state due to drug therapy  -     CBC Auto Differential; Future; Expected date: 07/23/2025  -     X-Ray Chest PA And Lateral; Future; Expected date: 07/23/2025    6. Normocytic anemia  -     CBC Auto Differential; Future; Expected date: 07/23/2025    Other orders  -     Discontinue: traZODone (DESYREL) 100 MG tablet; Take 1 tablet (100 mg total) by mouth nightly as needed for Insomnia.  Dispense: 90 tablet; Refill: 0  -     traZODone (DESYREL) 100 MG tablet; Take 1 tablet (100 mg total) by mouth nightly as needed for Insomnia.  Dispense: 90 tablet; Refill: 0         Health Maintenance         Date Due Completion Date    RSV  Vaccine (Age 60+ and Pregnant patients) (1 - Risk 60-74 years 1-dose series) Never done ---    Influenza Vaccine (1) 09/01/2025 10/16/2024    Colorectal Cancer Screening 02/25/2026 2/25/2021    Lipid Panel 04/25/2026 4/25/2025    Mammogram 06/10/2026 6/10/2025    DEXA Scan 07/03/2026 7/3/2024    TETANUS VACCINE 09/09/2030 9/9/2020            -Patient's lab results were reviewed and discussed with patient  -Treatment options and alternatives were discussed with the patient. Patient expressed understanding. Patient was given the opportunity to ask questions and be an active participant in their medical care. Patient had no further questions or concerns at this time.     Follow up: Follow up in about 2 weeks (around 8/5/2025) for Follow Up as scheduled.    Care Plan/Goals: Reviewed No   Goals         limit pain - extend life - remain independent (pt-stated)       Achievable - within patient's control: Yes    Difficulties Identified: significant thoracic back pain - advanced CKD - s/p heart transplant    Plan for Overcoming difficulties: cont close f/u w specialists, healthy food and beverage choices, support of friends    Timeframe for completion: indefinite                      After visit summary printed and given to patient upon discharge.  Patient goals and care plan are included in After visit summary.    TOTAL TIME evaluating and managing this patient for this encounter was 49 minutes. This time was spent personally by me on some of the following activities: review of patient's past medical history, assessing age-appropriate health maintenance needs, review of any interval history, review and interpretation of lab results, review and interpretation of imaging test results, review and interpretation of cardiology test results, reviewing consulting specialist notes, obtaining history from the patient and family, examination of the patient, medication reconciliation, managing and/or ordering prescription  medications, ordering imaging tests, ordering referral to subspecialty provider(s), educating patient and answering their questions about diagnosis, treatment plan, and goals of treatment, discussing planned follow-up and final documentation of the visit. This time was exclusive of any separately billable procedures for this patient and exclusive of time spent treating any other patients.     This note was generated with the assistance of ambient listening technology. Verbal consent was obtained by the patient and accompanying visitor(s) for the recording of patient appointment to facilitate this note. I attest to having reviewed and edited the generated note for accuracy, though some syntax or spelling errors may persist. Please contact the author of this note for any clarification.

## 2025-07-22 NOTE — PATIENT INSTRUCTIONS
If you are feeling unwell, we'd like to be the first ones to know here at [facility]! Please give us a call. Same day appointments are our top priority to keep you well and out of the emergency rooms and hospitals. Call [phone] for our direct line. After hours advice is always available. Please call [phone] after hours to speak to the on-call team.      Recommend RSV vaccine that can be scheduled at your pharmacy of choice.  .    HYDRATE - REST - ELEVATE LEGS     Ok to take 2 of the trazodone 50 mg tabs until start the trazodone 100 mg tabs

## 2025-07-23 ENCOUNTER — DOCUMENTATION ONLY (OUTPATIENT)
Dept: OPHTHALMOLOGY | Facility: CLINIC | Age: 70
End: 2025-07-23
Payer: MEDICARE

## 2025-07-23 ENCOUNTER — HOSPITAL ENCOUNTER (OUTPATIENT)
Dept: RADIOLOGY | Facility: HOSPITAL | Age: 70
Discharge: HOME | End: 2025-07-23
Attending: INTERNAL MEDICINE
Payer: MEDICARE

## 2025-07-23 ENCOUNTER — TELEPHONE (OUTPATIENT)
Dept: PRIMARY CARE CLINIC | Facility: CLINIC | Age: 70
End: 2025-07-23
Payer: MEDICARE

## 2025-07-23 ENCOUNTER — OFFICE VISIT (OUTPATIENT)
Dept: RHEUMATOLOGY | Facility: CLINIC | Age: 70
End: 2025-07-23
Payer: MEDICARE

## 2025-07-23 VITALS
SYSTOLIC BLOOD PRESSURE: 157 MMHG | BODY MASS INDEX: 26.53 KG/M2 | HEART RATE: 88 BPM | HEIGHT: 62 IN | WEIGHT: 144.19 LBS | DIASTOLIC BLOOD PRESSURE: 88 MMHG

## 2025-07-23 DIAGNOSIS — Z91.89 AT HIGH RISK FOR HYPOCALCEMIA: ICD-10-CM

## 2025-07-23 DIAGNOSIS — I46.9 CARDIAC ARREST: ICD-10-CM

## 2025-07-23 DIAGNOSIS — Z71.89 COUNSELING ON HEALTH PROMOTION AND DISEASE PREVENTION: ICD-10-CM

## 2025-07-23 DIAGNOSIS — I25.758 CORONARY ARTERY DISEASE OF NATIVE ARTERY OF TRANSPLANTED HEART WITH STABLE ANGINA PECTORIS: Chronic | ICD-10-CM

## 2025-07-23 DIAGNOSIS — D84.821 IMMUNOCOMPROMISED STATE DUE TO DRUG THERAPY: ICD-10-CM

## 2025-07-23 DIAGNOSIS — N25.81 SECONDARY HYPERPARATHYROIDISM, RENAL: Chronic | ICD-10-CM

## 2025-07-23 DIAGNOSIS — Z51.81 MEDICATION MONITORING ENCOUNTER: ICD-10-CM

## 2025-07-23 DIAGNOSIS — R07.9 CHEST PAIN, UNSPECIFIED TYPE: Chronic | ICD-10-CM

## 2025-07-23 DIAGNOSIS — N18.5 CKD (CHRONIC KIDNEY DISEASE) STAGE 5, GFR LESS THAN 15 ML/MIN: Chronic | ICD-10-CM

## 2025-07-23 DIAGNOSIS — Z79.899 IMMUNOCOMPROMISED STATE DUE TO DRUG THERAPY: ICD-10-CM

## 2025-07-23 DIAGNOSIS — Z94.1 HISTORY OF HEART TRANSPLANT: ICD-10-CM

## 2025-07-23 DIAGNOSIS — M81.0 OSTEOPOROSIS, UNSPECIFIED OSTEOPOROSIS TYPE, UNSPECIFIED PATHOLOGICAL FRACTURE PRESENCE: Primary | ICD-10-CM

## 2025-07-23 DIAGNOSIS — R05.1 ACUTE COUGH: ICD-10-CM

## 2025-07-23 DIAGNOSIS — E55.9 VITAMIN D INSUFFICIENCY: ICD-10-CM

## 2025-07-23 DIAGNOSIS — E79.0 HYPERURICEMIA WITHOUT SIGNS INFLAMMATORY ARTHRITIS/TOPHACEOUS DISEASE: ICD-10-CM

## 2025-07-23 PROCEDURE — 99999 PR PBB SHADOW E&M-EST. PATIENT-LVL V: CPT | Mod: PBBFAC,HCNC,, | Performed by: INTERNAL MEDICINE

## 2025-07-23 PROCEDURE — 71046 X-RAY EXAM CHEST 2 VIEWS: CPT | Mod: TC,HCNC

## 2025-07-23 PROCEDURE — 71046 X-RAY EXAM CHEST 2 VIEWS: CPT | Mod: 26,HCNC,, | Performed by: RADIOLOGY

## 2025-07-23 NOTE — PROGRESS NOTES
RHEUMATOLOGY OUTPATIENT CLINIC NOTE    7/23/2025    Attending Rheumatologist: Prasanth Johnson  Primary Care Provider/Physician Requesting Consultation: Elis Wick MD   Chief Complaint/Reason For Consultation:  Osteoporosis      Subjective:     aNdia Damon is a 70 y.o. Black or  female with OP    Evista recommended last clinic visit (recurrent hypocalcemia w/ nasal calcitonin).  Interval episode of cardiac arrest (4/24), responded to resuscitation.  Several medications discontinued following hospital DC, including evista.  No acute complaints at present.    Review of Systems   Constitutional:  Negative for fever.   Eyes:  Negative for photophobia and pain.   Cardiovascular:  Negative for chest pain.   Gastrointestinal:  Negative for melena.   Genitourinary:  Negative for dysuria and urgency.   Musculoskeletal:  Negative for joint pain.   Skin:  Negative for rash.   Neurological:  Negative for focal weakness.       Chronic comorbid conditions affecting medical decision making today:  Past Medical History:   Diagnosis Date    Abdominal wall hernia     CT Renal 6/11/2018---Small fat containing superior ventral abdominal wall hernia at the epicardial pacing lead site.    Abnormal mammogram 10/12/2021    Acute cystitis without hematuria 05/10/2022    Acute ischemic right middle cerebral artery (MCA) stroke 07/20/2024    Adverse drug reaction 11/12/2024    GRACE (acute kidney injury) 11/22/2021    Anxiety     Aphasia 07/19/2024    Arthritis     ZEN HIPS    Breast cancer in female 08/2021    LEFT BREAST    Breast mass, right 11/13/2024    RIGHT BREAST MASS (Problem)      Bronchitis 08/18/2016    Never smoked      C. difficile colitis 11/29/2021    Cellulitis of axilla, left 12/23/2021    Chronic diastolic heart failure 12/16/2021    Chronic kidney disease     stage 4, GFR 15-29 ml/min    Chronic midline low back pain without sciatica 06/18/2018    CKD (chronic kidney disease) stage 4, GFR 15-29  ml/min 04/20/2016    US Retro   5/16/2018---Mild cortical thinning and increased cortical echogenicity.  Findings can be seen with medical renal disease.  8/31/216--- Echogenic kidneys with diffuse cortical thinning suggesting  medical renal disease. 2 complex right renal cortical cysts.      Closed nondisplaced fracture of distal phalanx of left great toe with routine healing 10/22/2018    Coronary artery disease 1993    heart transplant    COVID-19 in immunocompromised patient 02/26/2024    Cystitis 05/10/2022    Depression     Elevated troponin 05/13/2025    Encounter for blood transfusion     Encounter for palliative care 10/14/2024    Endotracheally intubated 04/28/2025    Fibromyalgia     on Lyrica    Heart failure     native heart cardiomyopathy    Heart transplanted 1993    due to cardiomyopathy    History of hyperparathyroidism; Hyperparathyroidism, secondary renal     PT DENIES    History of left breast cancer 2021    rad and chemo    Hypertension     Immune disorder     anti rejection meds    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    Impaired mobility 07/28/2022    Iron deficiency anemia 08/15/2017    Kidney stones     passed per pt    Lower extremity edema 12/11/2024    Multifactoral: CKD stage 5, CHF, venous insufficiency      Obesity     Obesity (BMI 30.0-34.9) 07/22/2019    Other osteoporosis without current pathological fracture 08/30/2019    Other pancytopenia 05/06/2024    Palliative care encounter 04/30/2025    Parotitis, acute 09/19/2023    Pharyngitis 01/09/2019    Pneumonia due to infectious organism 03/14/2024    PONV (postoperative nausea and vomiting)     Rapid palpitations 10/16/2024    Severe sepsis 11/22/2021    Shingles 2003 approx    left leg    Stage 4 chronic kidney disease 11/22/2021    Subclinical hypothyroidism 06/16/2023    Syncope 05/13/2025    Thrombocytopenia, unspecified 11/29/2021    Trouble in sleeping     Unresponsiveness 11/13/2024    Urinary  incontinence      Past Surgical History:   Procedure Laterality Date    ABLATION OF MEDIAL BRANCH NERVE OF LUMBAR SPINE FACET JOINT Bilateral 6/23/2025    Procedure: Bilateral L4-5 and L5-S1 RFA;  Surgeon: Jassi Pierre MD;  Location: [facility];  Service: Pain Management;  Laterality: Bilateral;    bladder implant Right 06/11/2024    BLADDER SURGERY  2015 approx    mesh - Dr Everett then 2nd reconstructive sx Dr Onofre    BREAST BIOPSY Bilateral     NEGATIVE    BREAST BIOPSY Right 10/31/2022    benign    BREAST LUMPECTOMY Left 2021    rad and chemo    BREAST SURGERY Left 09/28/2015    Bx - benign    BREAST SURGERY Right 12/2015    Bx benign    CARDIAC PACEMAKER REMOVAL Left 06/26/2014    Pacer defirillator removed. Put in 1993 aat time of heart transplant    CARPAL TUNNEL RELEASE Left 03/03/2015    Dr. Hall    COLONOSCOPY N/A 02/25/2021    Procedure: COLONOSCOPY;  Surgeon: Freida Ramirez MD;  Location: [facility];  Service: Endoscopy;  Laterality: N/A;    CYSTOCELE REPAIR      Twice with mesh removal    ECHOCARDIOGRAM,TRANSESOPHAGEAL N/A 04/26/2025    Procedure: Transesophageal echo (AYAAN) intra-procedure log documentation;  Surgeon: Barron Chinchilla MD;  Location: [facility] OR [address] [place] [address];  Service: Cardiothoracic;  Laterality: N/A;    EPIDURAL STEROID INJECTION INTO CERVICAL SPINE N/A 02/02/2023    Procedure: T11/T12 IL HELLEN;  Surgeon: Jassi Pierre MD;  Location: [facility];  Service: Pain Management;  Laterality: N/A;    EPIDURAL STEROID INJECTION INTO CERVICAL SPINE N/A 09/16/2024    Procedure: T11/T12 IL HELLEN;  Surgeon: Jassi Pierre MD;  Location: [facility];  Service: Pain Management;  Laterality: N/A;    HEART TRANSPLANT  1993    HERNIA REPAIR Right 1971 approx    Inguinal    HYSTERECTOMY  1983    vag hyst /LSO     IMPLANTATION OF PERMANENT SACRAL NERVE STIMULATOR N/A 06/11/2024    Procedure: INSERTION, NEUROSTIMULATOR, PERMANENT, SACRAL;  Surgeon: Sami Cochran MD;  Location:  [facility] OR;  Service: Urology;  Laterality: N/A;    INCISION AND DRAINAGE OF ABSCESS Left 12/24/2021    Procedure: INCISION AND DRAINAGE, ABSCESS;  Surgeon: Joseph Longo MD;  Location: [facility] OR;  Service: General;  Laterality: Left;    INJECTION OF ANESTHETIC AGENT AROUND MEDIAL BRANCH NERVES INNERVATING LUMBAR FACET JOINT Right 10/19/2022    Procedure: Right L4/L5 and L5/S1 MBB;  Surgeon: Jassi Pierre MD;  Location: [facility];  Service: Pain Management;  Laterality: Right;    INJECTION OF ANESTHETIC AGENT AROUND MEDIAL BRANCH NERVES INNERVATING LUMBAR FACET JOINT Right 11/09/2022    Procedure: Right L4/L5 and L5/S1 MBB;  Surgeon: Jassi Pierre MD;  Location: [facility];  Service: Pain Management;  Laterality: Right;    INJECTION OF ANESTHETIC AGENT AROUND MEDIAL BRANCH NERVES INNERVATING LUMBAR FACET JOINT Left 02/06/2025    Procedure: Left L4-5 and L5-S1 MBB #1 with local;  Surgeon: Jassi Pierre MD;  Location: [facility];  Service: Pain Management;  Laterality: Left;    INJECTION OF ANESTHETIC AGENT AROUND MEDIAL BRANCH NERVES INNERVATING LUMBAR FACET JOINT Left 03/19/2025    Procedure: Left L4-5 and L5-S1 MBB #2 with local;  Surgeon: Jassi Pierre MD;  Location: [facility];  Service: Pain Management;  Laterality: Left;    INJECTION OF ANESTHETIC AGENT INTO SACROILIAC JOINT Right 08/22/2022    Procedure: Right SIJ Injection Right L5/S1 Facte Injection;  Surgeon: Jassi Pierre MD;  Location: [facility];  Service: Pain Management;  Laterality: Right;    INSERTION OF TUNNELED CENTRAL VENOUS CATHETER (CVC) WITH SUBCUTANEOUS PORT N/A 11/09/2021    Procedure: [id number];  Surgeon: Christoph Douglas MD;  Location: [facility] OR;  Service: General;  Laterality: N/A;    RADIOFREQUENCY ABLATION Right 12/05/2024    Procedure: Right L4-5 and L5-S1 RFA;  Surgeon: Jassi Pierre MD;  Location: [facility];  Service: Pain Management;  Laterality: Right;    RADIOFREQUENCY  THERMOCOAGULATION Right 12/07/2022    Procedure: Right L4/L5 and L5/S1 Lumbar RFA;  Surgeon: Jassi Pierre MD;  Location: [facility];  Service: Pain Management;  Laterality: Right;    REMOVAL OF ELECTRODE LEAD OF SACRAL NERVE STIMULATOR N/A 02/18/2025    Procedure: REMOVAL, ELECTRODE LEAD, SACRAL NERVE STIMULATOR;  Surgeon: Sami Cochran MD;  Location: [facility] OR;  Service: Urology;  Laterality: N/A;    REMOVAL OF VASCULAR ACCESS PORT      ROBOT-ASSISTED LAPAROSCOPIC ABDOMINAL SACROCOLPOPEXY N/A 08/10/2023    Procedure: ROBOTIC SACROCOLPOPEXY, ABDOMEN;  Surgeon: PRANAY Villalobos MD;  Location: [facility] OR;  Service: OB/GYN;  Laterality: N/A;    ROBOT-ASSISTED LAPAROSCOPIC OOPHORECTOMY Right 08/10/2023    Procedure: ROBOTIC OOPHORECTOMY;  Surgeon: PRANAY Villalobos MD;  Location: [facility] OR;  Service: OB/GYN;  Laterality: Right;    SENTINEL LYMPH NODE BIOPSY Left 10/12/2021    Procedure: BIOPSY, LYMPH NODE, SENTINEL;  Surgeon: Christoph Douglas MD;  Location: [facility] OR;  Service: General;  Laterality: Left;    TOE SURGERY      XI ROBOTIC URETHROPEXY N/A 08/10/2023    Procedure: XI ROBOTIC URETHROPEXY;  Surgeon: PRANAY Villalobos MD;  Location: [facility] OR;  Service: OB/GYN;  Laterality: N/A;     Family History   Problem Relation Name Age of Onset    Cancer Mother  38        breast    Breast cancer Mother      Breast cancer Maternal Grandmother      Heart disease Maternal Grandmother      Hypertension Son      Cataracts Cousin      Diabetes Neg Hx      Stroke Neg Hx      Kidney disease Neg Hx      Asthma Neg Hx      COPD Neg Hx      Melanoma Neg Hx      Hyperlipidemia Neg Hx       Tobacco Use History[1]  Current Medications[2]     Objective:     Vitals:    07/23/25 1144   BP: (!) 157/88   Pulse: 88     Physical Exam   Pulmonary/Chest: Effort normal. No respiratory distress.   Musculoskeletal:         General: No swelling or tenderness. Normal range of motion.       Reviewed available old and all outside pertinent  medical records available.    All lab results personally reviewed and interpreted by me.       ASSESSMENT / PLAN     1. Osteoporosis, unspecified osteoporosis type, unspecified pathological fracture presence  More risk than benefits with OP pharmacotherapy (hx of CVA, cardiac arrest, medication induced hypocalcemia, CKD).  No pharmacotherapy recommended at present.  Ambulatory referral/consult to Rheumatology      2. Secondary hyperparathyroidism, renal  Ambulatory referral/consult to Rheumatology    PTH, Intact      3. History of heart transplant        4. Coronary artery disease of native artery of transplanted heart with stable angina pectoris  Increased CV risk  w/ evenity      5. Cardiac arrest  Increased VTE risk w/ raloxifene: not recommended in this context.      6. CKD (chronic kidney disease) stage 5, GFR less than 15 ml/min  Follow with nephrology for CKD-MBD management.      7. At high risk for hypocalcemia  Prior medication induced hypocalcemia w/ nasal calcitonin.  Expecting more profound and longer lasting w/ denosumab use.      8. Hyperuricemia without signs inflammatory arthritis/tophaceous disease  Monitor need for ULT.      9. Medication monitoring encounter  Comprehensive Metabolic Panel      10. Vitamin D insufficiency  Vitamin D      11. Counseling on health promotion and disease prevention  Low purine, mediterranean diet.  Ca/vit D supp /400mg BID to serum target.              Visit today included increased complexity associated with the care of the episodic problem Osteoporosis addressed and managing the longitudinal care of the patient due to the serious and/or complex managed problem(s) At high risk for hypocalcemia, Medication monitoring encounter , Counseling on health promotion and disease prevention .    45 minutes of total time spent on the encounter, which includes face to face time and non-face to face time preparing to see the patient (eg, review of tests), Obtaining and/or  reviewing separately obtained history, Documenting clinical information in the electronic or other health record, Independently interpreting results (not separately reported) and communicating results to the patient/family/caregiver, or Care coordination (not separately reported).     Prasanth Johnson M.D.           [1]   Social History  Tobacco Use   Smoking Status Never    Passive exposure: Never   Smokeless Tobacco Never   [2]   Current Outpatient Medications:     calcitRIOL (ROCALTROL) 0.25 MCG Cap, Take 1 capsule (0.25 mcg total) by mouth once daily., Disp: 30 capsule, Rfl: 11    carvediloL (COREG) 3.125 MG tablet, TAKE 1 TABLET TWICE DAILY WITH MEALS, Disp: 180 tablet, Rfl: 3    cycloSPORINE (NEORAL) 100 mg/mL microemulsion solution, Take 0.5 mLs (50 mg total) by mouth every 12 (twelve) hours., Disp: 50 mL, Rfl: 12    furosemide (LASIX) 40 MG tablet, Take ½ tablet (20 mg total) by mouth once daily., Disp: 30 tablet, Rfl: 11    isosorbide mononitrate (IMDUR) 60 MG 24 hr tablet, Take 1 tablet (60 mg total) by mouth once daily., Disp: 30 tablet, Rfl: 11    levothyroxine (SYNTHROID) 25 MCG tablet, Take 1 tablet (25 mcg total) by mouth before breakfast., Disp: 60 tablet, Rfl: 0    LIDOcaine (LIDODERM) 5 %, Place 1 patch onto the skin daily as needed (RIb Pain). Place patch over the affected area for up to 12 hours.  Please wait 12 hours before placing a new patch., Disp: 30 patch, Rfl: 0    NIFEdipine (PROCARDIA-XL) 60 MG (OSM) 24 hr tablet, Take 1 tablet (60 mg total) by mouth 2 (two) times a day., Disp: 60 tablet, Rfl: 11    nitroGLYCERIN (NITROSTAT) 0.4 MG SL tablet, PLACE 1 TABLET UNDER THE TONGUE EVERY 5 MINS AS NEEDED FOR CHEST PAIN FOR UP TO 3 DOSES.IF CONTINUED HEART PAIN/PRESSURE AFTER, Disp: 100 tablet, Rfl: 0    ondansetron (ZOFRAN) 4 MG tablet, TAKE 1 TABLET EVERY 12 HOURS AS NEEDED FOR NAUSEA, Disp: 16 tablet, Rfl: 0    pantoprazole (PROTONIX) 40 MG tablet, Take 1 tablet (40 mg total) by mouth once  daily., Disp: 90 tablet, Rfl: 3    patiromer calcium sorbitex (VELTASSA) 8.4 gram PwPk, Take 1 packet (8.4 g total) by mouth once daily, Disp: 30 packet, Rfl: 6    polyethylene glycol (GLYCOLAX) 17 gram PwPk, Take 17 g by mouth once daily., Disp: , Rfl:     prednisoLONE acetate (PRED FORTE) 1 % DrpS, Place 1 drop into the right eye 4 (four) times daily., Disp: 5 mL, Rfl: 1    pregabalin (LYRICA) 50 MG capsule, Take 1 capsule (50 mg total) by mouth every evening., Disp: 30 capsule, Rfl: 6    rosuvastatin (CRESTOR) 5 MG tablet, Take 1 tablet (5 mg total) by mouth once daily., Disp: 30 tablet, Rfl: 5    sertraline (ZOLOFT) 25 MG tablet, Take 1 tablet (25 mg total) by mouth once daily., Disp: 30 tablet, Rfl: 11    sodium bicarbonate 650 MG tablet, Take 1 tablet (650 mg total) by mouth 2 (two) times daily., Disp: 60 tablet, Rfl: 1    temazepam (RESTORIL) 30 mg capsule, Take 1 capsule (30 mg total) by mouth nightly as needed for Insomnia., Disp: 30 capsule, Rfl: 1    traZODone (DESYREL) 100 MG tablet, Take 1 tablet (100 mg total) by mouth nightly as needed for Insomnia., Disp: 90 tablet, Rfl: 0    vitamin E 400 UNIT capsule, Take 400 Units by mouth once daily., Disp: , Rfl:

## 2025-07-23 NOTE — PROGRESS NOTES
Short Stay Record    CC: Blurry Vision     Impression: Visually significant cataract with reasonable expectation for visual improvement Right Eye    External Exam  Last IOP 13 OU      Right Left   External Normal Normal     Slit Lamp Exam     Right Left   Lids/Lashes Normal Normal   Conjunctiva/Sclera White and quiet White and quiet   Cornea Guttata Guttata   Anterior Chamber Deep and quiet Deep and quiet   Iris Round and reactive Round and reactive   Lens 3+ Cortical cataract 3+ Cortical cataract   Vitreous Normal Normal     Fundus Exam     Right Left   Disc Normal Normal   C/D Ratio 0.4 0.4   Macula Normal Normal   Vessels Normal Normal   Periphery Normal Normal   Edited by: Huseyin Marx MD       Manifest Refraction     Sphere Cylinder [place] Dist [facility]   Right +1.25 +1.00 015 20/25   Left +1.00 +1.75 165 20/25       Planned Procedure:   Topography Reviewed: Yes  History of Refractive Surgery: No      Phaco right eye, +24.0 CNWET0   W/ kenalog   Vision blue   Complex   Topical  Standard IOL   No IV or vitals to Left arm*  Prescriptions sent for preoperative medications  Prednisolone Acetate- 4xs daily   Anticoagulant status: Yes - ASA   Pt underwent recent respiratory failure and acute kidney injury 04/2025  *No IV or vitals to Left arm*  Will consult cardio to confirm she can d/c asa 14 days prior to surgery    Pt defers vivity lens after exam       Lens Selection OD verified _____           Previous IOL OS n/a    Patient cleared for ophthalmic surgery.     Discharge Summary:    Admitting Diagnosis: Cortical  OD    Discharge Diagnosis: Pseudophakia OD    Procedure: Phacoemulsification of cataract with intraocular lens implant OD    Complications: None  Discharge Condition: Stable    Discharge instructions: Follow post-operative discharge instruction sheet given by provider.    Follow-up: Return to clinic next day or as scheduled.       HPI:  Nadia Damon is a 70 y.o. female who presents for evaluation  prior to ophthalmic surgery, right eye. Patient reports of blurred vision at distance and near with and without correction. Patient reports of glare at night reducing function and safety, and complains of needed additional light to read.     Past Medical History:   Diagnosis Date    Abdominal wall hernia     CT Renal 6/11/2018---Small fat containing superior ventral abdominal wall hernia at the epicardial pacing lead site.    Abnormal mammogram 10/12/2021    Acute cystitis without hematuria 05/10/2022    Acute ischemic right middle cerebral artery (MCA) stroke 07/20/2024    Adverse drug reaction 11/12/2024    GRACE (acute kidney injury) 11/22/2021    Anxiety     Aphasia 07/19/2024    Arthritis     ZEN HIPS    Breast cancer in female 08/2021    LEFT BREAST    Breast mass, right 11/13/2024    RIGHT BREAST MASS (Problem)      Bronchitis 08/18/2016    Never smoked      C. difficile colitis 11/29/2021    Cellulitis of axilla, left 12/23/2021    Chronic diastolic heart failure 12/16/2021    Chronic kidney disease     stage 4, GFR 15-29 ml/min    Chronic midline low back pain without sciatica 06/18/2018    CKD (chronic kidney disease) stage 4, GFR 15-29 ml/min 04/20/2016    US Retro   5/16/2018---Mild cortical thinning and increased cortical echogenicity.  Findings can be seen with medical renal disease.  8/31/216--- Echogenic kidneys with diffuse cortical thinning suggesting  medical renal disease. 2 complex right renal cortical cysts.      Closed nondisplaced fracture of distal phalanx of left great toe with routine healing 10/22/2018    Coronary artery disease 1993    heart transplant    COVID-19 in immunocompromised patient 02/26/2024    Cystitis 05/10/2022    Depression     Elevated troponin 05/13/2025    Encounter for blood transfusion     Encounter for palliative care 10/14/2024    Endotracheally intubated 04/28/2025    Fibromyalgia     on Lyrica    Heart failure     native heart cardiomyopathy    Heart transplanted  1993    due to cardiomyopathy    History of hyperparathyroidism; Hyperparathyroidism, secondary renal     PT DENIES    History of left breast cancer 2021    rad and chemo    Hypertension     Immune disorder     anti rejection meds    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    Impaired mobility 07/28/2022    Iron deficiency anemia 08/15/2017    Kidney stones     passed per pt    Lower extremity edema 12/11/2024    Multifactoral: CKD stage 5, CHF, venous insufficiency      Obesity     Obesity (BMI 30.0-34.9) 07/22/2019    Other osteoporosis without current pathological fracture 08/30/2019    Other pancytopenia 05/06/2024    Palliative care encounter 04/30/2025    Parotitis, acute 09/19/2023    Pharyngitis 01/09/2019    Pneumonia due to infectious organism 03/14/2024    PONV (postoperative nausea and vomiting)     Rapid palpitations 10/16/2024    Severe sepsis 11/22/2021    Shingles 2003 approx    left leg    Stage 4 chronic kidney disease 11/22/2021    Subclinical hypothyroidism 06/16/2023    Syncope 05/13/2025    Thrombocytopenia, unspecified 11/29/2021    Trouble in sleeping     Unresponsiveness 11/13/2024    Urinary incontinence      Past Surgical History:   Procedure Laterality Date    ABLATION OF MEDIAL BRANCH NERVE OF LUMBAR SPINE FACET JOINT Bilateral 6/23/2025    Procedure: Bilateral L4-5 and L5-S1 RFA;  Surgeon: Jassi Pierre MD;  Location: [facility];  Service: Pain Management;  Laterality: Bilateral;    bladder implant Right 06/11/2024    BLADDER SURGERY  2015 approx    mesh - Dr Everett then 2nd reconstructive sx Dr Onofre    BREAST BIOPSY Bilateral     NEGATIVE    BREAST BIOPSY Right 10/31/2022    benign    BREAST LUMPECTOMY Left 2021    rad and chemo    BREAST SURGERY Left 09/28/2015    Bx - benign    BREAST SURGERY Right 12/2015    Bx benign    CARDIAC PACEMAKER REMOVAL Left 06/26/2014    Pacer defirillator removed. Put in 1993 aat time of heart transplant    CARPAL TUNNEL  RELEASE Left 03/03/2015    Dr. Hall    COLONOSCOPY N/A 02/25/2021    Procedure: COLONOSCOPY;  Surgeon: Freida Ramirez MD;  Location: [facility] ENDO;  Service: Endoscopy;  Laterality: N/A;    CYSTOCELE REPAIR      Twice with mesh removal    ECHOCARDIOGRAM,TRANSESOPHAGEAL N/A 04/26/2025    Procedure: Transesophageal echo (AYAAN) intra-procedure log documentation;  Surgeon: Barron Chinchilla MD;  Location: [facility] OR [address] [place] [address];  Service: Cardiothoracic;  Laterality: N/A;    EPIDURAL STEROID INJECTION INTO CERVICAL SPINE N/A 02/02/2023    Procedure: T11/T12 IL HELLEN;  Surgeon: Jassi Pierre MD;  Location: [facility] PAIN MGT;  Service: Pain Management;  Laterality: N/A;    EPIDURAL STEROID INJECTION INTO CERVICAL SPINE N/A 09/16/2024    Procedure: T11/T12 IL HELLEN;  Surgeon: Jassi Pierre MD;  Location: [facility] PAIN MGT;  Service: Pain Management;  Laterality: N/A;    HEART TRANSPLANT  1993    HERNIA REPAIR Right 1971 approx    Inguinal    HYSTERECTOMY  1983    vag hyst /LSO     IMPLANTATION OF PERMANENT SACRAL NERVE STIMULATOR N/A 06/11/2024    Procedure: INSERTION, NEUROSTIMULATOR, PERMANENT, SACRAL;  Surgeon: Sami Cochran MD;  Location: [facility] OR;  Service: Urology;  Laterality: N/A;    INCISION AND DRAINAGE OF ABSCESS Left 12/24/2021    Procedure: INCISION AND DRAINAGE, ABSCESS;  Surgeon: Joseph Longo MD;  Location: [facility] OR;  Service: General;  Laterality: Left;    INJECTION OF ANESTHETIC AGENT AROUND MEDIAL BRANCH NERVES INNERVATING LUMBAR FACET JOINT Right 10/19/2022    Procedure: Right L4/L5 and L5/S1 MBB;  Surgeon: Jassi Pierre MD;  [facility];  Service: Pain Management;  Laterality: Right;    INJECTION OF ANESTHETIC AGENT AROUND MEDIAL BRANCH NERVES INNERVATING LUMBAR FACET JOINT Right 11/09/2022    Procedure: Right L4/L5 and L5/S1 MBB;  Surgeon: Jassi Pierre MD;  [facility];  Service: Pain Management;  Laterality: Right;    INJECTION OF ANESTHETIC AGENT AROUND MEDIAL  BRANCH NERVES INNERVATING LUMBAR FACET JOINT Left 02/06/2025    Procedure: Left L4-5 and L5-S1 MBB #1 with local;  Surgeon: Jassi Pierre MD;  Location: [facility] PAIN MGT;  Service: Pain Management;  Laterality: Left;    INJECTION OF ANESTHETIC AGENT AROUND MEDIAL BRANCH NERVES INNERVATING LUMBAR FACET JOINT Left 03/19/2025    Procedure: Left L4-5 and L5-S1 MBB #2 with local;  Surgeon: Jassi Pierre MD;  Location: [facility] PAIN [facility]GT;  Service: Pain Management;  Laterality: Left;    INJECTION OF ANESTHETIC AGENT INTO SACROILIAC JOINT Right 08/22/2022    Procedure: Right SIJ Injection Right L5/S1 Facte Injection;  Surgeon: Jassi Pierre MD;  [facility];  Service: Pain Management;  Laterality: Right;    INSERTION OF TUNNELED CENTRAL VENOUS CATHETER (CVC) WITH SUBCUTANEOUS PORT N/A 11/09/2021    Procedure: [id number];  Surgeon: Christoph Douglas MD;  Location: [facility] OR;  Service: General;  Laterality: N/A;    RADIOFREQUENCY ABLATION Right 12/05/2024    Procedure: Right L4-5 and L5-S1 RFA;  Surgeon: Jassi Pierre MD;  Location: [facility] PAIN MGT;  Service: Pain Management;  Laterality: Right;    RADIOFREQUENCY THERMOCOAGULATION Right 12/07/2022    Procedure: Right L4/L5 and L5/S1 Lumbar RFA;  Surgeon: Jassi Pierre MD;  Location: [facility] PAIN MGT;  Service: Pain Management;  Laterality: Right;    REMOVAL OF ELECTRODE LEAD OF SACRAL NERVE STIMULATOR N/A 02/18/2025    Procedure: REMOVAL, ELECTRODE LEAD, SACRAL NERVE STIMULATOR;  Surgeon: Sami Cochran MD;  Location: [facility] OR;  Service: Urology;  Laterality: N/A;    REMOVAL OF VASCULAR ACCESS PORT      ROBOT-ASSISTED LAPAROSCOPIC ABDOMINAL SACROCOLPOPEXY N/A 08/10/2023    Procedure: ROBOTIC SACROCOLPOPEXY, ABDOMEN;  Surgeon: PRANAY Villalobos MD;  Location: [facility] OR;  Service: OB/GYN;  Laterality: N/A;    ROBOT-ASSISTED LAPAROSCOPIC OOPHORECTOMY Right 08/10/2023    Procedure: ROBOTIC OOPHORECTOMY;  Surgeon: PRANAY Villalobos MD;   Location: [facility] OR;  Service: OB/GYN;  Laterality: Right;    SENTINEL LYMPH NODE BIOPSY Left 10/12/2021    Procedure: BIOPSY, LYMPH NODE, SENTINEL;  Surgeon: Christoph Douglas MD;  Location: [facility] OR;  Service: General;  Laterality: Left;    TOE SURGERY      XI ROBOTIC URETHROPEXY N/A 08/10/2023    Procedure: XI ROBOTIC URETHROPEXY;  Surgeon: PRANAY Villalobos MD;  Location: [facility] OR;  Service: OB/GYN;  Laterality: N/A;     Review of patient's allergies indicates:   Allergen Reactions    Dobutamine Other (See Comments)     Severe Left sided chest pain after dosage administered for Dobutamine Stress Test; itching    Lisinopril Swelling and Rash    Hydrocodone-acetaminophen Nausea Only    Augmentin [amoxicillin-pot clavulanate] Diarrhea    Zyvox [linezolid] Nausea And Vomiting     Current Medications[1]    Review of Systems:  A comprehensive review of systems was negative.    Physical Exam:  General Appearance:    A&Ox3, no distress, appears stated age   Head:    Normocephalic, without obvious abnormality, atraumatic   Eyes:    PERRL, EOM's intact   Back:     Symmetric, no curvature   Lungs:     Respirations unlabored   Chest Wall:    No tenderness or deformity    Heart:  Abdomen:  Extremities:  Skin:    S1 and S2 present    Soft, non-tender    Extremities normal, atraumatic    Skin color, texture, turgor normal                  [1]   Current Outpatient Medications:     calcitRIOL (ROCALTROL) 0.25 MCG Cap, Take 1 capsule (0.25 mcg total) by mouth once daily., Disp: 30 capsule, Rfl: 11    carvediloL (COREG) 3.125 MG tablet, TAKE 1 TABLET TWICE DAILY WITH MEALS, Disp: 180 tablet, Rfl: 3    cycloSPORINE (NEORAL) 100 mg/mL microemulsion solution, Take 0.5 mLs (50 mg total) by mouth every 12 (twelve) hours., Disp: 50 mL, Rfl: 12    furosemide (LASIX) 40 MG tablet, Take ½ tablet (20 mg total) by mouth once daily., Disp: 30 tablet, Rfl: 11    isosorbide mononitrate (IMDUR) 60 MG 24 hr tablet, Take 1 tablet (60 mg total) by mouth  once daily., Disp: 30 tablet, Rfl: 11    levothyroxine (SYNTHROID) 25 MCG tablet, Take 1 tablet (25 mcg total) by mouth before breakfast., Disp: 60 tablet, Rfl: 0    LIDOcaine (LIDODERM) 5 %, Place 1 patch onto the skin daily as needed (RIb Pain). Place patch over the affected area for up to 12 hours.  Please wait 12 hours before placing a new patch., Disp: 30 patch, Rfl: 0    NIFEdipine (PROCARDIA-XL) 60 MG (OSM) 24 hr tablet, Take 1 tablet (60 mg total) by mouth 2 (two) times a day., Disp: 60 tablet, Rfl: 11    nitroGLYCERIN (NITROSTAT) 0.4 MG SL tablet, PLACE 1 TABLET UNDER THE TONGUE EVERY 5 MINS AS NEEDED FOR CHEST PAIN FOR UP TO 3 DOSES.IF CONTINUED HEART PAIN/PRESSURE AFTER, Disp: 100 tablet, Rfl: 0    ondansetron (ZOFRAN) 4 MG tablet, TAKE 1 TABLET EVERY 12 HOURS AS NEEDED FOR NAUSEA, Disp: 16 tablet, Rfl: 0    pantoprazole (PROTONIX) 40 MG tablet, Take 1 tablet (40 mg total) by mouth once daily., Disp: 90 tablet, Rfl: 3    patiromer calcium sorbitex (VELTASSA) 8.4 gram PwPk, Take 1 packet (8.4 g total) by mouth once daily, Disp: 30 packet, Rfl: 6    polyethylene glycol (GLYCOLAX) 17 gram PwPk, Take 17 g by mouth once daily., Disp: , Rfl:     prednisoLONE acetate (PRED FORTE) 1 % DrpS, Place 1 drop into the right eye 4 (four) times daily., Disp: 5 mL, Rfl: 1    pregabalin (LYRICA) 50 MG capsule, Take 1 capsule (50 mg total) by mouth every evening., Disp: 30 capsule, Rfl: 6    rosuvastatin (CRESTOR) 5 MG tablet, Take 1 tablet (5 mg total) by mouth once daily., Disp: 30 tablet, Rfl: 5    sertraline (ZOLOFT) 25 MG tablet, Take 1 tablet (25 mg total) by mouth once daily., Disp: 30 tablet, Rfl: 11    sodium bicarbonate 650 MG tablet, Take 1 tablet (650 mg total) by mouth 2 (two) times daily., Disp: 60 tablet, Rfl: 1    temazepam (RESTORIL) 30 mg capsule, Take 1 capsule (30 mg total) by mouth nightly as needed for Insomnia., Disp: 30 capsule, Rfl: 1    traZODone (DESYREL) 100 MG tablet, Take 1 tablet (100 mg  total) by mouth nightly as needed for Insomnia., Disp: 90 tablet, Rfl: 0    vitamin E 400 UNIT capsule, Take 400 Units by mouth once daily., Disp: , Rfl:

## 2025-07-23 NOTE — TELEPHONE ENCOUNTER
Pt viewed message and results in [facility] Informed pt of results, pt VU. No further questions. Pt report feeling better today.         ----- Message from Elis Wick MD sent at 7/23/2025  2:04 PM CDT -----  Pt does not use [facility] pt portal - please call w message below:    Chest Xray is clear. No fluid, no pneumonia.  How are you feeling?      ----- Message -----  From: Interface, Rad Results In  Sent: 7/23/2025  12:47 PM CDT  To: Elis Wick MD

## 2025-07-24 ENCOUNTER — DOCUMENT SCAN (OUTPATIENT)
Dept: HOME HEALTH SERVICES | Facility: HOSPITAL | Age: 70
End: 2025-07-24
Payer: MEDICARE

## 2025-07-25 ENCOUNTER — TELEPHONE (OUTPATIENT)
Dept: CARDIOLOGY | Facility: CLINIC | Age: 70
End: 2025-07-25
Payer: MEDICARE

## 2025-07-25 ENCOUNTER — TELEPHONE (OUTPATIENT)
Dept: OPHTHALMOLOGY | Facility: CLINIC | Age: 70
End: 2025-07-25
Payer: MEDICARE

## 2025-07-25 NOTE — TELEPHONE ENCOUNTER
Spoke to pt and she defers the vivity lens due to cost- will have Dr Marx pick new lens next week. Pt also states she already stopped ASA.   I asked her if she had clearance to do so from cardio and she states she is awaiting to hear back from them as she had a recent increased heart rate, they are calling her back. I also told her to mention that again to the cardio/ pcp and that she was only to stop that once cleared by them. Pt understands verbally and will contact us back

## 2025-07-25 NOTE — TELEPHONE ENCOUNTER
Pt called and she states that she is having increased palpations since her having her nuc med testing.  She saw her PCP and she has her drinking water, doing her deep breathing and making sure she uses gas x and laxitive to make sure the palpations are not due to being constipated.     Please advise

## 2025-07-29 ENCOUNTER — DOCUMENT SCAN (OUTPATIENT)
Dept: HOME HEALTH SERVICES | Facility: HOSPITAL | Age: 70
End: 2025-07-29
Payer: MEDICARE

## 2025-07-29 ENCOUNTER — OFFICE VISIT (OUTPATIENT)
Dept: CARDIOLOGY | Facility: CLINIC | Age: 70
End: 2025-07-29
Payer: MEDICARE

## 2025-07-29 VITALS
BODY MASS INDEX: 26.17 KG/M2 | WEIGHT: 142.19 LBS | HEART RATE: 79 BPM | OXYGEN SATURATION: 95 % | DIASTOLIC BLOOD PRESSURE: 70 MMHG | SYSTOLIC BLOOD PRESSURE: 130 MMHG | HEIGHT: 62 IN

## 2025-07-29 DIAGNOSIS — Z94.1 S/P ORTHOTOPIC HEART TRANSPLANT: Chronic | ICD-10-CM

## 2025-07-29 DIAGNOSIS — D63.1 ANEMIA ASSOCIATED WITH STAGE 5 CHRONIC RENAL FAILURE: Chronic | ICD-10-CM

## 2025-07-29 DIAGNOSIS — E78.49 OTHER HYPERLIPIDEMIA: Chronic | ICD-10-CM

## 2025-07-29 DIAGNOSIS — I10 ESSENTIAL HYPERTENSION: Chronic | ICD-10-CM

## 2025-07-29 DIAGNOSIS — R00.2 PALPITATIONS: Primary | ICD-10-CM

## 2025-07-29 DIAGNOSIS — I25.758 CORONARY ARTERY DISEASE OF NATIVE ARTERY OF TRANSPLANTED HEART WITH STABLE ANGINA PECTORIS: Chronic | ICD-10-CM

## 2025-07-29 DIAGNOSIS — N18.5 CKD (CHRONIC KIDNEY DISEASE) STAGE 5, GFR LESS THAN 15 ML/MIN: Chronic | ICD-10-CM

## 2025-07-29 DIAGNOSIS — N18.5 ANEMIA ASSOCIATED WITH STAGE 5 CHRONIC RENAL FAILURE: Chronic | ICD-10-CM

## 2025-07-29 DIAGNOSIS — I87.2 CHRONIC VENOUS INSUFFICIENCY: Chronic | ICD-10-CM

## 2025-07-29 DIAGNOSIS — K21.9 GASTROESOPHAGEAL REFLUX DISEASE WITHOUT ESOPHAGITIS: Chronic | ICD-10-CM

## 2025-07-29 PROCEDURE — 99999 PR PBB SHADOW E&M-EST. PATIENT-LVL IV: CPT | Mod: PBBFAC,HCNC,,

## 2025-07-29 NOTE — ASSESSMENT & PLAN NOTE
Recently developed since stress test and viral illness  EKG in PCP office reviewed  Discussed contributing factors which could lead to heart racing such as anemia or an illness.   Will plan for cardiac monitor for further work up. Ultimately discussed treating underlying factors. Continue with current medications.

## 2025-07-29 NOTE — ASSESSMENT & PLAN NOTE
Stress test was normal   Recent chest discomfort with deep breaths consistent with pleuritic pain.   Continue with current medications   Risk factor modification

## 2025-07-29 NOTE — PROGRESS NOTES
Subjective:   Patient ID:  Nadia Damon is a 70 y.o. female who presents for evaluation of No chief complaint on file.      HPI 69 y/o female with medical conditions of anemia, leukopenia, breast CA s/p excision and chemo/radiation, cervical spine DJD, CVA, CKD, GERD and remote history of OHT in 5/1993 who presents today for cardiac eval.     Previously saw Dr. Hawkins in May 2025 with medication adjustments. Plans for medical management at this time given her CKD  Reports an ache to her chest with heart racing since stress test   Stress test 7/2025 normal   Echo 7/2025 EF 60%, DF, LA mod dilated, PA pressure 32  Doing good with medications prescribed at last visit. Noticed improvement in chest discomfort with imdur   After stress test developed a cold, negative for COVID  Coughing up sputum appears mostly brown   Sore throat has improved   Palpitations feels like heart racing happens everyday, lasts few minutes. Takes some deep breaths to try and help  No true exacerbating or alleviating factors   Gets some pressure with deep breaths like pleuritic pain  Has 2 aids which help with her ADLs. She does no cooking or cleaning. Living in assisted living   Does have occasional dizziness. No true exacerbating or alleviating factors. Does seem to occur when standing up from sitting   Mostly DONOHUE which has been ongoing. Does appear to be anemic. Getting injections for anemia was previously off of this for some time  Recent EKG in PCP office 7/22   BP has been controlled, HR controlled   Sleeps in a recliner since 1993   Denies syncope, LH, near syncope, SOB at rest, chest pain       Past Medical History:   Diagnosis Date    Abdominal wall hernia     CT Renal 6/11/2018---Small fat containing superior ventral abdominal wall hernia at the epicardial pacing lead site.    Abnormal mammogram 10/12/2021    Acute cystitis without hematuria 05/10/2022    Acute ischemic right middle cerebral artery (MCA) stroke 07/20/2024     Adverse drug reaction 11/12/2024    GRACE (acute kidney injury) 11/22/2021    Anxiety     Aphasia 07/19/2024    Arthritis     ZEN HIPS    Breast cancer in female 08/2021    LEFT BREAST    Breast mass, right 11/13/2024    RIGHT BREAST MASS (Problem)      Bronchitis 08/18/2016    Never smoked      C. difficile colitis 11/29/2021    Cellulitis of axilla, left 12/23/2021    Chronic diastolic heart failure 12/16/2021    Chronic kidney disease     stage 4, GFR 15-29 ml/min    Chronic midline low back pain without sciatica 06/18/2018    CKD (chronic kidney disease) stage 4, GFR 15-29 ml/min 04/20/2016    US Retro   5/16/2018---Mild cortical thinning and increased cortical echogenicity.  Findings can be seen with medical renal disease.  8/31/216--- Echogenic kidneys with diffuse cortical thinning suggesting  medical renal disease. 2 complex right renal cortical cysts.      Closed nondisplaced fracture of distal phalanx of left great toe with routine healing 10/22/2018    Coronary artery disease 1993    heart transplant    COVID-19 in immunocompromised patient 02/26/2024    Cystitis 05/10/2022    Depression     Elevated troponin 05/13/2025    Encounter for blood transfusion     Encounter for palliative care 10/14/2024    Endotracheally intubated 04/28/2025    Fibromyalgia     on Lyrica    Heart failure     native heart cardiomyopathy    Heart transplanted 1993    due to cardiomyopathy    History of hyperparathyroidism; Hyperparathyroidism, secondary renal     PT DENIES    History of left breast cancer 2021    rad and chemo    Hypertension     Immune disorder     anti rejection meds    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    Impaired mobility 07/28/2022    Iron deficiency anemia 08/15/2017    Kidney stones     passed per pt    Lower extremity edema 12/11/2024    Multifactoral: CKD stage 5, CHF, venous insufficiency      Obesity     Obesity (BMI 30.0-34.9) 07/22/2019    Other osteoporosis without current  pathological fracture 08/30/2019    Other pancytopenia 05/06/2024    Palliative care encounter 04/30/2025    Parotitis, acute 09/19/2023    Pharyngitis 01/09/2019    Pneumonia due to infectious organism 03/14/2024    PONV (postoperative nausea and vomiting)     Rapid palpitations 10/16/2024    Severe sepsis 11/22/2021    Shingles 2003 approx    left leg    Stage 4 chronic kidney disease 11/22/2021    Subclinical hypothyroidism 06/16/2023    Syncope 05/13/2025    Thrombocytopenia, unspecified 11/29/2021    Trouble in sleeping     Unresponsiveness 11/13/2024    Urinary incontinence        Past Surgical History:   Procedure Laterality Date    ABLATION OF MEDIAL BRANCH NERVE OF LUMBAR SPINE FACET JOINT Bilateral 6/23/2025    Procedure: Bilateral L4-5 and L5-S1 RFA;  Surgeon: Jassi Pierre MD;  Location: [facility];  Service: Pain Management;  Laterality: Bilateral;    bladder implant Right 06/11/2024    BLADDER SURGERY  2015 approx    mesh - Dr Everett then 2nd reconstructive sx Dr Onofre    BREAST BIOPSY Bilateral     NEGATIVE    BREAST BIOPSY Right 10/31/2022    benign    BREAST LUMPECTOMY Left 2021    rad and chemo    BREAST SURGERY Left 09/28/2015    Bx - benign    BREAST SURGERY Right 12/2015    Bx benign    CARDIAC PACEMAKER REMOVAL Left 06/26/2014    Pacer defirillator removed. Put in 1993 aat time of heart transplant    CARPAL TUNNEL RELEASE Left 03/03/2015    Dr. Hall    COLONOSCOPY N/A 02/25/2021    Procedure: COLONOSCOPY;  Surgeon: Freida Ramirez MD;  Location: [facility];  Service: Endoscopy;  Laterality: N/A;    CYSTOCELE REPAIR      Twice with mesh removal    ECHOCARDIOGRAM,TRANSESOPHAGEAL N/A 04/26/2025    Procedure: Transesophageal echo (AYAAN) intra-procedure log documentation;  Surgeon: Barron Chinchilla MD;  Location: [facility] OR [address] [place] [address];  Service: Cardiothoracic;  Laterality: N/A;    EPIDURAL STEROID INJECTION INTO CERVICAL SPINE N/A 02/02/2023    Procedure: T11/T12 IL HELLEN;  Surgeon: Jassi  MD Ignacio;  Location: [facility];  Service: Pain Management;  Laterality: N/A;    EPIDURAL STEROID INJECTION INTO CERVICAL SPINE N/A 09/16/2024    Procedure: T11/T12 IL HELLEN;  Surgeon: Jassi Pierre MD;  Location: [facility];  Service: Pain Management;  Laterality: N/A;    HEART TRANSPLANT  1993    HERNIA REPAIR Right 1971 approx    Inguinal    HYSTERECTOMY  1983    vag hyst /LSO     IMPLANTATION OF PERMANENT SACRAL NERVE STIMULATOR N/A 06/11/2024    Procedure: INSERTION, NEUROSTIMULATOR, PERMANENT, SACRAL;  Surgeon: Sami Cochran MD;  Location: [facility] OR;  Service: Urology;  Laterality: N/A;    INCISION AND DRAINAGE OF ABSCESS Left 12/24/2021    Procedure: INCISION AND DRAINAGE, ABSCESS;  Surgeon: Joseph Longo MD;  Location: [facility] OR;  Service: General;  Laterality: Left;    INJECTION OF ANESTHETIC AGENT AROUND MEDIAL BRANCH NERVES INNERVATING LUMBAR FACET JOINT Right 10/19/2022    Procedure: Right L4/L5 and L5/S1 MBB;  Surgeon: Jassi Pierre MD;  Location: [facility];  Service: Pain Management;  Laterality: Right;    INJECTION OF ANESTHETIC AGENT AROUND MEDIAL BRANCH NERVES INNERVATING LUMBAR FACET JOINT Right 11/09/2022    Procedure: Right L4/L5 and L5/S1 MBB;  Surgeon: Jassi Pierre MD;  Location: [facility];  Service: Pain Management;  Laterality: Right;    INJECTION OF ANESTHETIC AGENT AROUND MEDIAL BRANCH NERVES INNERVATING LUMBAR FACET JOINT Left 02/06/2025    Procedure: Left L4-5 and L5-S1 MBB #1 with local;  Surgeon: Jassi Pierre MD;  Location: [facility];  Service: Pain Management;  Laterality: Left;    INJECTION OF ANESTHETIC AGENT AROUND MEDIAL BRANCH NERVES INNERVATING LUMBAR FACET JOINT Left 03/19/2025    Procedure: Left L4-5 and L5-S1 MBB #2 with local;  Surgeon: Jassi Pierre MD;  Location: [facility];  Service: Pain Management;  Laterality: Left;    INJECTION OF ANESTHETIC AGENT INTO SACROILIAC JOINT Right 08/22/2022    Procedure: Right SIJ  Injection Right L5/S1 Facte Injection;  Surgeon: Jassi Pierre MD;  Location: [facility];  Service: Pain Management;  Laterality: Right;    INSERTION OF TUNNELED CENTRAL VENOUS CATHETER (CVC) WITH SUBCUTANEOUS PORT N/A 11/09/2021    Procedure: [id number];  Surgeon: Christoph Douglas MD;  Location: [facility] OR;  Service: General;  Laterality: N/A;    RADIOFREQUENCY ABLATION Right 12/05/2024    Procedure: Right L4-5 and L5-S1 RFA;  Surgeon: Jassi Pierre MD;  Location: [facility];  Service: Pain Management;  Laterality: Right;    RADIOFREQUENCY THERMOCOAGULATION Right 12/07/2022    Procedure: Right L4/L5 and L5/S1 Lumbar RFA;  Surgeon: Jassi Pierre MD;  Location: [facility];  Service: Pain Management;  Laterality: Right;    REMOVAL OF ELECTRODE LEAD OF SACRAL NERVE STIMULATOR N/A 02/18/2025    Procedure: REMOVAL, ELECTRODE LEAD, SACRAL NERVE STIMULATOR;  Surgeon: Sami Cochran MD;  Location: [facility] OR;  Service: Urology;  Laterality: N/A;    REMOVAL OF VASCULAR ACCESS PORT      ROBOT-ASSISTED LAPAROSCOPIC ABDOMINAL SACROCOLPOPEXY N/A 08/10/2023    Procedure: ROBOTIC SACROCOLPOPEXY, ABDOMEN;  Surgeon: PRANAY Villalobos MD;  Location: [facility] OR;  Service: OB/GYN;  Laterality: N/A;    ROBOT-ASSISTED LAPAROSCOPIC OOPHORECTOMY Right 08/10/2023    Procedure: ROBOTIC OOPHORECTOMY;  Surgeon: PRANAY Villalobos MD;  Location: [facility] OR;  Service: OB/GYN;  Laterality: Right;    SENTINEL LYMPH NODE BIOPSY Left 10/12/2021    Procedure: BIOPSY, LYMPH NODE, SENTINEL;  Surgeon: Christoph Douglas MD;  Location: [facility] OR;  Service: General;  Laterality: Left;    TOE SURGERY      XI ROBOTIC URETHROPEXY N/A 08/10/2023    Procedure: XI ROBOTIC URETHROPEXY;  Surgeon: PRANAY Villalobos MD;  Location: [facility] OR;  Service: OB/GYN;  Laterality: N/A;       Social History[1]    Family History   Problem Relation Name Age of Onset    Cancer Mother  38        breast    Breast cancer Mother      Breast cancer Maternal  Grandmother      Heart disease Maternal Grandmother      Hypertension Son      Cataracts Cousin      Diabetes Neg Hx      Stroke Neg Hx      Kidney disease Neg Hx      Asthma Neg Hx      COPD Neg Hx      Melanoma Neg Hx      Hyperlipidemia Neg Hx         Review of Systems   Constitutional: Positive for malaise/fatigue.   Cardiovascular:  Positive for dyspnea on exertion, leg swelling and palpitations. Negative for chest pain, near-syncope and syncope.   Respiratory:  Positive for cough and sputum production. Negative for shortness of breath.    Neurological:  Positive for dizziness. Negative for light-headedness.       Current Outpatient Medications on File Prior to Visit   Medication Sig    calcitRIOL (ROCALTROL) 0.25 MCG Cap Take 1 capsule (0.25 mcg total) by mouth once daily.    carvediloL (COREG) 3.125 MG tablet TAKE 1 TABLET TWICE DAILY WITH MEALS    cycloSPORINE (NEORAL) 100 mg/mL microemulsion solution Take 0.5 mLs (50 mg total) by mouth every 12 (twelve) hours.    furosemide (LASIX) 40 MG tablet Take ½ tablet (20 mg total) by mouth once daily.    isosorbide mononitrate (IMDUR) 60 MG 24 hr tablet Take 1 tablet (60 mg total) by mouth once daily.    levothyroxine (SYNTHROID) 25 MCG tablet Take 1 tablet (25 mcg total) by mouth before breakfast.    LIDOcaine (LIDODERM) 5 % Place 1 patch onto the skin daily as needed (RIb Pain). Place patch over the affected area for up to 12 hours.  Please wait 12 hours before placing a new patch.    NIFEdipine (PROCARDIA-XL) 60 MG (OSM) 24 hr tablet Take 1 tablet (60 mg total) by mouth 2 (two) times a day.    nitroGLYCERIN (NITROSTAT) 0.4 MG SL tablet PLACE 1 TABLET UNDER THE TONGUE EVERY 5 MINS AS NEEDED FOR CHEST PAIN FOR UP TO 3 DOSES.IF CONTINUED HEART PAIN/PRESSURE AFTER    ondansetron (ZOFRAN) 4 MG tablet TAKE 1 TABLET EVERY 12 HOURS AS NEEDED FOR NAUSEA    pantoprazole (PROTONIX) 40 MG tablet Take 1 tablet (40 mg total) by mouth once daily.    polyethylene glycol  (GLYCOLAX) 17 gram PwPk Take 17 g by mouth once daily.    prednisoLONE acetate (PRED FORTE) 1 % DrpS Place 1 drop into the right eye 4 (four) times daily.    pregabalin (LYRICA) 50 MG capsule Take 1 capsule (50 mg total) by mouth every evening.    rosuvastatin (CRESTOR) 5 MG tablet Take 1 tablet (5 mg total) by mouth once daily.    sertraline (ZOLOFT) 25 MG tablet Take 1 tablet (25 mg total) by mouth once daily.    sodium bicarbonate 650 MG tablet Take 1 tablet (650 mg total) by mouth 2 (two) times daily.    temazepam (RESTORIL) 30 mg capsule Take 1 capsule (30 mg total) by mouth nightly as needed for Insomnia.    traZODone (DESYREL) 100 MG tablet Take 1 tablet (100 mg total) by mouth nightly as needed for Insomnia.    vitamin E 400 UNIT capsule Take 400 Units by mouth once daily.     No current facility-administered medications on file prior to visit.       Objective:   Objective:  Wt Readings from Last 3 Encounters:   07/29/25 64.5 kg (142 lb 3.2 oz)   07/23/25 65.4 kg (144 lb 2.9 oz)   07/22/25 65.8 kg (145 lb 1 oz)     Temp Readings from Last 3 Encounters:   07/22/25 98 °F (36.7 °C) (Temporal)   07/17/25 97.1 °F (36.2 °C)   07/17/25 97.1 °F (36.2 °C) (Temporal)     BP Readings from Last 3 Encounters:   07/29/25 130/70   07/23/25 (!) 157/88   07/22/25 (!) 140/70     Pulse Readings from Last 3 Encounters:   07/29/25 79   07/23/25 88   07/22/25 87       Physical Exam  Vitals reviewed.   Constitutional:       Appearance: Normal appearance.   HENT:      Head: Normocephalic and atraumatic.      Nose: Nose normal.   Eyes:      Extraocular Movements: Extraocular movements intact.   Cardiovascular:      Rate and Rhythm: Normal rate.      Pulses:           Radial pulses are 2+ on the right side and 2+ on the left side.      Heart sounds: No murmur heard.  Pulmonary:      Effort: Pulmonary effort is normal.      Breath sounds: Normal breath sounds.   Abdominal:      Palpations: Abdomen is soft.   Musculoskeletal:          General: Normal range of motion.      Cervical back: Neck supple.      Right lower leg: Edema (trace) present.      Left lower leg: Edema (trace) present.      Comments: Compression to BLE    Skin:     General: Skin is warm and dry.   Neurological:      Mental Status: She is alert and oriented to person, place, and time. Mental status is at baseline.   Psychiatric:         Mood and Affect: Mood normal.         Behavior: Behavior normal.         Lab Results   Component Value Date    CHOL 196 04/25/2025    CHOL 142 07/20/2024    CHOL 152 07/19/2024     Lab Results   Component Value Date    HDL 59 04/25/2025    HDL 44 07/20/2024    HDL 48 07/19/2024     Lab Results   Component Value Date    LDLCALC 98.6 04/25/2025    LDLCALC 81.0 07/20/2024    LDLCALC 73.8 07/19/2024     Lab Results   Component Value Date    TRIG 192 (H) 04/25/2025    TRIG 85 07/20/2024    TRIG 151 (H) 07/19/2024     Lab Results   Component Value Date    CHOLHDL 30.1 04/25/2025    CHOLHDL 31.0 07/20/2024    CHOLHDL 31.6 07/19/2024       Chemistry        Component Value Date/Time     07/23/2025 1112     02/24/2025 1048     02/24/2025 1048    K 4.6 07/23/2025 1112    K 4.7 02/24/2025 1048    K 4.7 02/24/2025 1048     07/23/2025 1112     02/24/2025 1048     02/24/2025 1048    CO2 25 07/23/2025 1112    CO2 21 (L) 02/24/2025 1048    CO2 21 (L) 02/24/2025 1048    BUN 31 (H) 07/23/2025 1112    CREATININE 3.1 (H) 07/23/2025 1112    GLU 88 07/23/2025 1112    GLU 76 02/24/2025 1048    GLU 76 02/24/2025 1048        Component Value Date/Time    CALCIUM 9.0 07/23/2025 1112    CALCIUM 9.7 02/24/2025 1048    CALCIUM 9.7 02/24/2025 1048    ALKPHOS 153 (H) 07/23/2025 1112    ALKPHOS 120 02/24/2025 1048    AST 43 07/23/2025 1112    AST 42 (H) 02/24/2025 1048    ALT 27 07/23/2025 1112    ALT 28 02/24/2025 1048    BILITOT 0.4 07/23/2025 1112    BILITOT 0.4 02/24/2025 1048    ESTGFRAFRICA 19 (A) 07/14/2022 1100    EGFRNONAA 17 (A)  07/14/2022 1100          Lab Results   Component Value Date    TSH 17.189 (H) 06/16/2025     Lab Results   Component Value Date    INR 0.9 05/07/2025    INR 1.0 04/27/2025    INR 1.0 11/12/2024    PROTIME 10.7 05/07/2025    PROTIME 10.9 04/27/2025     Lab Results   Component Value Date    WBC 3.61 (L) 07/23/2025    HGB 9.0 (L) 07/23/2025    HCT 29.0 (L) 07/23/2025    MCV 93 07/23/2025     07/23/2025     BNP  @LABRCNTIP(BNP,BNPTRIAGEBLO)@  Estimated Creatinine Clearance: 14.7 mL/min (A) (based on SCr of 3.1 mg/dL (H)).     Imaging:  ======    No results found for this or any previous visit.    Results for orders placed during the hospital encounter of 05/12/25    US Carotid Bilateral    Narrative  EXAM:  US CAROTID BILATERAL    CLINICAL HISTORY:  Syncope    TECHNIQUE: Real-time, color, and duplex evaluation (including peak systolic velocities and systolic velocity ratios) of the carotid arteries was performed.    COMPARISON: None    FINDINGS:  Right side: No significant plaque in the right common or right internal carotid artery.  Peak systolic velocity in the right ICA is 26 cm/s.  Systolic velocity ratio is 1.3.  Antegrade flow in the right vertebral artery.    Left side: No significant plaque in the left common or left internal carotid artery.  Peak systolic velocity in the left ICA is 111 cm/s.  Systolic velocity ratio is 1.6.  Antegrade flow in the left vertebral artery    Impression  No hemodynamically significant stenosis of either internal carotid artery.  Antegrade flow of the bilateral vertebral arteries..      Note: Validated velocity measurements with angiographic measurements, velocity criteria are extrapolated from diameter data as defined by the Society of Radiologists in Ultrasound Consensus Conference Radiology 2003;229;340-46.    Finalized on: 5/14/2025 7:54 PM By:  Elmer Bella MD  [facility]# [id number]      2025-05-14 19:56:22.131     [facility]    Results for orders placed during the hospital  encounter of 07/23/25    X-Ray Chest PA And Lateral    Narrative  EXAMINATION:  XR CHEST PA AND LATERAL    CLINICAL HISTORY:  Acute cough    TECHNIQUE:  PA and lateral views of the chest were performed.    COMPARISON:  N 05/07/2025 one    FINDINGS:  Postoperative changes and epicardial pacer as before.  Heart size upper limit of normal or slightly enlarged.  The lungs are clear.  No pleural effusion.    Impression  See above      Electronically signed by: Omar Rosenberg MD  Date:    07/23/2025  Time:    12:45    No results found for this or any previous visit.    No valid procedures specified.    No results found for this or any previous visit.      Results for orders placed during the hospital encounter of 07/17/25    Nuclear Stress - Cardiology Interpreted    Interpretation Summary    Normal myocardial perfusion scan. There is no evidence of myocardial ischemia or infarction.    There is a mild to moderate intensity perfusion abnormality in the anterior wall of the left ventricle, secondary to breast attenuation.    The gated perfusion images showed an ejection fraction of 90% at rest. The gated perfusion images showed an ejection fraction of 91% post stress. Normal ejection fraction is greater than 59%.    There is normal wall motion at rest and post-stress.    LV cavity size is normal at rest and normal at post-stress.    The ECG portion of the study is negative for ischemia.      Results for orders placed during the hospital encounter of 07/17/25    Echo    Interpretation Summary    Left Ventricle: The left ventricle is normal in size. Moderately increased wall thickness. There is concentric remodeling. There is normal systolic function. Ejection fraction is approximately 60%.    Right Ventricle: The right ventricle is normal in size measuring 3.0 cmWall thickness is normal. . Systolic function is normal.    Left Atrium: The left atrium is moderately dialted.    Tricuspid Valve: There is mild regurgitation with a  centrally directed jet.    Pulmonary Artery: The estimated pulmonary artery systolic pressure is 32 mmHg.    IVC/SVC: Normal venous pressure at 3 mmHg.      Diagnostic Results:  ECG: Reviewed    The ASCVD Risk score (Magdalene LOPEZ, et al., 2019) failed to calculate for the following reasons:    Risk score cannot be calculated because patient has a medical history suggesting prior/existing ASCVD        Assessment and Plan:   1. Palpitations  Assessment & Plan:  Recently developed since stress test and viral illness  EKG in PCP office reviewed  Discussed contributing factors which could lead to heart racing such as anemia or an illness.   Will plan for cardiac monitor for further work up. Ultimately discussed treating underlying factors. Continue with current medications.     Orders:  -     Cardiac Monitor - 3-15 Day Adult; Future    2. Coronary artery disease of native artery of transplanted heart with stable angina pectoris  Assessment & Plan:  Stress test was normal   Recent chest discomfort with deep breaths consistent with pleuritic pain.   Continue with current medications   Risk factor modification         3. Essential hypertension  Assessment & Plan:  Appears controlled   Reading at home this morning was 111/65  Continue with current medications   Monitor BP       4. S/P orthotopic heart transplant  Overview:  5/93       5. Other hyperlipidemia  Overview:  goal LDL < 70 if tolerated    Assessment & Plan:  Statin       6. Chronic venous insufficiency  Assessment & Plan:  Compression and elevation       7. CKD (chronic kidney disease) stage 5, GFR less than 15 ml/min  Assessment & Plan:  Continue with current treatment   Monitor       8. Anemia associated with stage 5 chronic renal failure  Assessment & Plan:  Continue with current treatment   Believe this is causing most of her current symptoms such as fatigue, DONOHUE and likely palpitations       9. Gastroesophageal reflux disease without esophagitis  Overview:  CT  Renal 6/11/2018---   Small hiatal hernia.    Assessment & Plan:  Continue with current treatment            Follow up in 3 months         [1]   Social History  Tobacco Use    Smoking status: Never     Passive exposure: Never    Smokeless tobacco: Never   Substance Use Topics    Alcohol use: Never     Alcohol/week: 0.0 standard drinks of alcohol    Drug use: No

## 2025-07-29 NOTE — ASSESSMENT & PLAN NOTE
Continue with current treatment   Believe this is causing most of her current symptoms such as fatigue, DONOHUE and likely palpitations

## 2025-07-29 NOTE — ASSESSMENT & PLAN NOTE
Appears controlled   Reading at home this morning was 111/65  Continue with current medications   Monitor BP

## 2025-07-30 ENCOUNTER — HOSPITAL ENCOUNTER (OUTPATIENT)
Dept: CARDIOLOGY | Facility: HOSPITAL | Age: 70
Discharge: HOME | End: 2025-07-30
Payer: MEDICARE

## 2025-07-30 ENCOUNTER — OFFICE VISIT (OUTPATIENT)
Dept: PAIN MEDICINE | Facility: CLINIC | Age: 70
End: 2025-07-30
Payer: MEDICARE

## 2025-07-30 VITALS
WEIGHT: 143.75 LBS | HEIGHT: 60 IN | HEART RATE: 93 BPM | SYSTOLIC BLOOD PRESSURE: 126 MMHG | BODY MASS INDEX: 28.22 KG/M2 | DIASTOLIC BLOOD PRESSURE: 70 MMHG | RESPIRATION RATE: 17 BRPM

## 2025-07-30 DIAGNOSIS — M54.14 THORACIC RADICULOPATHY: Primary | ICD-10-CM

## 2025-07-30 DIAGNOSIS — R00.2 PALPITATIONS: ICD-10-CM

## 2025-07-30 DIAGNOSIS — M47.816 LUMBAR SPONDYLOSIS: ICD-10-CM

## 2025-07-30 DIAGNOSIS — M47.814 THORACIC SPONDYLOSIS: ICD-10-CM

## 2025-07-30 DIAGNOSIS — S20.219S CONTUSION OF RIB, UNSPECIFIED LATERALITY, SEQUELA: ICD-10-CM

## 2025-07-30 PROCEDURE — 1101F PT FALLS ASSESS-DOCD LE1/YR: CPT | Mod: CPTII,HCNC,S$GLB, | Performed by: ANESTHESIOLOGY

## 2025-07-30 PROCEDURE — 1160F RVW MEDS BY RX/DR IN RCRD: CPT | Mod: CPTII,HCNC,S$GLB, | Performed by: ANESTHESIOLOGY

## 2025-07-30 PROCEDURE — 3066F NEPHROPATHY DOC TX: CPT | Mod: CPTII,HCNC,S$GLB, | Performed by: ANESTHESIOLOGY

## 2025-07-30 PROCEDURE — 99999 PR PBB SHADOW E&M-EST. PATIENT-LVL IV: CPT | Mod: PBBFAC,HCNC,, | Performed by: ANESTHESIOLOGY

## 2025-07-30 PROCEDURE — 3078F DIAST BP <80 MM HG: CPT | Mod: CPTII,HCNC,S$GLB, | Performed by: ANESTHESIOLOGY

## 2025-07-30 PROCEDURE — 99213 OFFICE O/P EST LOW 20 MIN: CPT | Mod: HCNC,S$GLB,, | Performed by: ANESTHESIOLOGY

## 2025-07-30 PROCEDURE — 1125F AMNT PAIN NOTED PAIN PRSNT: CPT | Mod: CPTII,HCNC,S$GLB, | Performed by: ANESTHESIOLOGY

## 2025-07-30 PROCEDURE — 1157F ADVNC CARE PLAN IN RCRD: CPT | Mod: CPTII,HCNC,S$GLB, | Performed by: ANESTHESIOLOGY

## 2025-07-30 PROCEDURE — 1159F MED LIST DOCD IN RCRD: CPT | Mod: CPTII,HCNC,S$GLB, | Performed by: ANESTHESIOLOGY

## 2025-07-30 PROCEDURE — 3288F FALL RISK ASSESSMENT DOCD: CPT | Mod: CPTII,HCNC,S$GLB, | Performed by: ANESTHESIOLOGY

## 2025-07-30 PROCEDURE — 3074F SYST BP LT 130 MM HG: CPT | Mod: CPTII,HCNC,S$GLB, | Performed by: ANESTHESIOLOGY

## 2025-07-30 PROCEDURE — 3008F BODY MASS INDEX DOCD: CPT | Mod: CPTII,HCNC,S$GLB, | Performed by: ANESTHESIOLOGY

## 2025-07-30 RX ORDER — OXYCODONE AND ACETAMINOPHEN 5; 325 MG/1; MG/1
1 TABLET ORAL EVERY 8 HOURS PRN
Qty: 21 TABLET | Refills: 0 | Status: SHIPPED | OUTPATIENT
Start: 2025-07-30

## 2025-07-30 RX ORDER — LIDOCAINE 50 MG/G
1 PATCH TOPICAL DAILY PRN
Qty: 30 PATCH | Refills: 0 | Status: SHIPPED | OUTPATIENT
Start: 2025-07-30

## 2025-07-30 RX ORDER — TIZANIDINE 2 MG/1
4 TABLET ORAL 2 TIMES DAILY PRN
Qty: 60 TABLET | Refills: 1 | Status: SHIPPED | OUTPATIENT
Start: 2025-07-30

## 2025-07-30 NOTE — PROGRESS NOTES
Interventional Pain Progress Note     Referring Physician: No ref. provider found    PCP: Elis Wick MD    Chief Complaint: Low Back Pain    Interval History (07/30/2025):      Patient returns to clinic after RFA.  Patient reports 80% relief after bilateral L4-5 and L5-S1 RFA on 06/23/2025.  Patient reports that lower lower bar pain is currently well-controlled.  Primarily pain today is thoracic pain.  Pain is described as an aching burning pain that starts in the lower interscapular and thoracic area.  Patient reports that this pain will then radiate to the right lower abdominal wall in the right lumbar spine.  Patient denies any significant radiation into her right lower extremity.  Patient denies any significant left-sided pain.  Pain is worse with rotation and walking, better with ice and heat.  Pain is currently rated a 3/10, but can increase to an 8/10 with exacerbating activity. Denies any fevers, chills, changes in gait, saddle anesthesia, or bowel and bladder incontinence        Interval History (05/29/2025):      Patient returns to clinic for evaluation of lower back pain.  Patient was initially proceeding with lumbar RFA however procedure was canceled due to increased chest pain today of the procedure.  Patient was subsequently admitted to inpatient care where she suffered a cardiac arrest undergoing 1 round of ACLS..  Patient was intubated and recovered.  After being discharged from the hospital, she was admitted again for chest pain and noted to be anemic and in acute renal failure.  This improved with rehydration and transfusion.   Primary pain complaint today is aching stabbing pain in the band across her lower back now both on her left and right side.  Patient also reports increase pain over her bilateral ribs after chest compressions.  Pain is worse with rotation and extension, better with rest and ice and heat.  Pain is currently rated a 9/10.      Interval History (3/7/2025):  Patient  Nadia Damon presents today for follow-up visit.  Patient states since she was seen she fell off her cough onto her side and buttocks. She rates her pain 10/10 and states it is in the lower back and radiating to the hips. At times she feels burning down the sides of the thighs. Pain is worse to extend back and stand up straight and walking.   Patient denies night fever/night sweats, urinary incontinence, bowel incontinence, significant weight loss and significant motor weakness.   Patient denies any other complaints or concerns at this time.      Interval History (3/4/2025):  Patient Nadia Damon presents today for follow-up visit.  Patient was last seen on 2/6/2025 for her 1st left L4/5 + L5/S1 lumbar MBB which provided 90% relief.she would like to move forward with the 2nd MBB.  She also had a fall recently and had a CT lumbar to evaluate for any fractures. No acute findings or fracture noted.  Pain continues to be 9/10 and primarily axial in the low back without radiculopathy.   Worse with prolonged standing and walking.   Patient denies night fever/night sweats, urinary incontinence, bowel incontinence, significant weight loss and significant motor weakness.   Patient denies any other complaints or concerns at this time.      Interval History (1/8/2025):  Patient Nadia Damon presents today for follow-up visit.  Patient was last seen on 12/5/2024 Right L4/5 + L5/S1 RFA 50% relief. She is doing better ambulating without her walker and cane. Pain not going into the legs currently. She rates her pain today 7/10. She continues to have left sided pain and inquires about doing the RFA on the left. Pain is worse with prolonged standing and does not radiate. Pain is primarily axial.  She has finished rehab/PT following her stroke.  Patient denies night fever/night sweats, urinary incontinence, bowel incontinence, significant weight loss and significant motor weakness.   Patient denies any other complaints or  concerns at this time.    Interval History (11/20/2024):  Patient Nadia Damon presents today for follow-up visit.  Patient was last seen on9/16/2024 T11/12 IL HELLEN with 85% relief. She had a stroke in July and saw neurosurgery following the stroke and was not interested in any surgical intervention. Overall she went through rehab and is improving in mobility and function. She continues to have axial low back pain with the right side worse than the left. No radiculopathy. Pain worse with standing and prolonged walking. Relief with leaning forward. She rates her pain today 7/10.  Patient denies night fever/night sweats, urinary incontinence, bowel incontinence, significant weight loss and significant motor weakness.   Patient denies any other complaints or concerns at this time.    Interval History (3/3/2023):  Patient returns to clinic after procedure.  Patient reports 100% relief and thoracic pain after T11-T12 interlaminar epidural steroid injection on 02/02/2023.  Patient reports occasional throbbing pain across her right lower back with no radiation to her buttocks or lower extremities.  Pain is worse with lifting and prolonged standing, better with heat and topicals.  Pain is rated as a year out 10 currently. Denies any fevers, chills, changes in gait, weakness, or bowel and bladder incontinence    Interval History (1/13/22):  Patient returns to clinic for evaluation of thoracic pain.  Patient reports approximately 2 weeks ago she had sudden onset of lower thoracic pain.  She denies any significant inciting factors to his pain.  Pain starts as a sharp stabbing pain in her lower midline thoracic area and then radiates to her right side to her anterior chest wall.  Pain is worse with standing and walking, better with lying supine.  Pain is rated an 8/10.  Patient reports that this pain is significantly different from her previous lumbar pain. Denies any fevers, chills,  or bowel and bladder  incontinence    Interval History (9/30/22):  Patient returns to clinic after procedure.  Patient reports complete resolution of right lower extremity pain after right sacroiliac joint injection and right L5-S1 facet injection on 08/22/2022.  Primary pain today is tight aching pain in right side of lower back.  Pain is worse with extension and rotation, better with rest and heat.  Patient does report an episode of physical therapy on 09/21/2022 where she experienced increased muscle aches and weakness.  She reports that since this time her symptoms have greatly improved.  She denies any significant weakness today.  Pain is rated a 7/10. Denies any fevers, chills, changes in gait, weakness, or bowel and bladder incontinence        SUBJECTIVE:    Nadia Damon is a 70 y.o. female who presents to the clinic for the evaluation of lower back pain. The pain started 1 year ago and symptoms have been worsening.The pain is located in the right lower back and right buttocks area with radiation to the posterior lateral aspect of her right thigh and occasionally her right calf.  The pain is described as aching, shooting and stabbing and is rated as 5/10.   The pain is rated with a score of  2/10 on the BEST day and a score of 9/10 on the WORST day.  Symptoms interfere with daily activity and work. The pain is exacerbated by Sitting, Standing, Extension and Flexing.  The pain is mitigated by physical therapy and rest.     Patient denies night fever/night sweats, urinary incontinence, bowel incontinence, significant weight loss, significant motor weakness and loss of sensations.      Non-Pharmacologic Treatments:  Physical Therapy/Home Exercise: yes  Ice/Heat:yes  TENS: no  Acupuncture: no  Massage: no  Chiropractic: no        Previous Pain Medications:  Tylenol, topicals, neuropathic,      report:  Reviewed and consistent with medication use as prescribed.    Pain Procedures:   - 06/23/2025: Bilateral L4-5 and L5-S1 RFA,  80% relief  2023:  T11-12 interlaminar epidural steroid injection, 100% relief  22:  Right L4-5 and L5-S1 radiofrequency ablation, 75% relief  22:  Right sacroiliac joint and right L5-S1 facet injection,   Resolution of right lower extremity pain  2024 T11/12 IL HELLEN with 85% relief    Imagin2025 CT lumbar  FINDINGS:  Minor grade 1 2 mm L4-5 spondylolisthesis is present.     Advanced T11-12 disc degeneration is present with high-grade disc narrowing and endplate sclerosis.  T10-11 and T11-12 vacuum disc phenomenon are evident.     T11-12: Severe disc degeneration as detailed above.  Mild to moderate right foraminal stenosis.     T12-L1: Mild disc bulge.     L1-2: Unremarkable.     L2-3: Unremarkable.     L3-4: Mild disc bulge with mild facet arthrosis.  Interspinous process bursitis.     L4-5: Mild degenerative disc narrowing with disc bulge.  Moderate to severe facet arthrosis with 2 mm spondylolisthesis.     L5-S1: Minor disc bulge.  Sacralized transverse processes with right pseudoarticulation.  Mild left foraminal stenosis.     Low-grade SI joint arthrosis.     Impression:     No acute abnormality.  Advanced T11-12 disc degeneration.     Mild degenerative changes L3-4, L4-5 and L5-S1 as detailed above.    2024 CT lumbar  FINDINGS:  No vertebral body compression deformity.  Normal bone mineral density.  Normal alignment of the vertebral bodies.  No soft tissue abnormality.  Mild moderate multilevel degenerative disc disease.  Atherosclerotic changes.     Impression:     No acute fracture or dislocation.  Mild moderate multilevel degenerative disc disease.  See recent myelogram for additional insights.    CT THORACIC SPINE WITHOUT CONTRAST 23     CLINICAL HISTORY:  Myelopathy, acute, thoracic spine;  Radiculopathy, thoracic region     TECHNIQUE:  Helical axial images are acquired through the thoracic spine without IV contrast.  Images were reformatted in the coronal sagittal  plane.     COMPARISON:  None     FINDINGS:  Paraspinal soft tissues appear within normal limits.  Partially visualized lung fields demonstrate band like opacity at the lingula.  Favor scarring/atelectasis.  Partially visualized abdominal contents are within normal limits.     Alignment is within normal limits.  There is no anterolisthesis or retrolisthesis.  Vertebral body heights are relatively well preserved.  There appears to be a chronic fracture deformity along the posterior spinous process of C7.  Borders of the bone fragment are well corticated.  No evidence of acute injury.  Partially visualized ribs appear intact.     Evaluation for central canal narrowing is limited without intrathecal contrast.     Multilevel degenerative disc changes are present, worst at the mid to lower thoracic spine.  Degenerative disc changes are worst at T11-T12.  There is a 5 mm disc bulge/herniation at T11-T12.  Disc bulge/herniation is slightly high density with minimal peripheral calcification.     No bony neural foraminal narrowing from C7 to T11.  There is moderate to severe right and mild-to-moderate left neural foraminal narrowing.     Impression:     1. Degenerative disc changes, worst at T11-T12.  Evaluation for central canal narrowing is limited without intrathecal contrast.  2. Bony neural foraminal narrowing is worst at the right T11-T12 foramen.         Results for orders placed during the hospital encounter of 07/13/22    X-Ray Lumbar Spine 5 View    Narrative  EXAMINATION:  XR LUMBAR SPINE COMPLETE 5 VIEW    CLINICAL HISTORY:  Sacrococcygeal disorders, not elsewhere classified    TECHNIQUE:  AP, lateral, spot and bilateral oblique views of the lumbar spine were performed.    COMPARISON:  Lumbar spine radiographs May 17, 2018    FINDINGS:  Minimal grade 1 anterolisthesis of L4 on L5.  No fracture or pars defect.  Unchanged mild disc height loss at the L4-L5 level.  Moderate to severe degenerative disc disease at the  T11-T12 level.  Prominent inferior lumbar spine facet arthropathy.  Sacroiliac joints appear normal.  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  No osseous erosion or suspicious osseous lesion.    Impression  As above.      Electronically signed by: Isac Bell  Date:    07/13/2022  Time:    15:39          Past Medical History:   Diagnosis Date    Abdominal wall hernia     CT Renal 6/11/2018---Small fat containing superior ventral abdominal wall hernia at the epicardial pacing lead site.    Abnormal mammogram 10/12/2021    Acute cystitis without hematuria 05/10/2022    Acute ischemic right middle cerebral artery (MCA) stroke 07/20/2024    Adverse drug reaction 11/12/2024    GRACE (acute kidney injury) 11/22/2021    Anxiety     Aphasia 07/19/2024    Arthritis     ZEN HIPS    Breast cancer in female 08/2021    LEFT BREAST    Breast mass, right 11/13/2024    RIGHT BREAST MASS (Problem)      Bronchitis 08/18/2016    Never smoked      C. difficile colitis 11/29/2021    Cellulitis of axilla, left 12/23/2021    Chronic diastolic heart failure 12/16/2021    Chronic kidney disease     stage 4, GFR 15-29 ml/min    Chronic midline low back pain without sciatica 06/18/2018    CKD (chronic kidney disease) stage 4, GFR 15-29 ml/min 04/20/2016    US Retro   5/16/2018---Mild cortical thinning and increased cortical echogenicity.  Findings can be seen with medical renal disease.  8/31/216--- Echogenic kidneys with diffuse cortical thinning suggesting  medical renal disease. 2 complex right renal cortical cysts.      Closed nondisplaced fracture of distal phalanx of left great toe with routine healing 10/22/2018    Coronary artery disease 1993    heart transplant    COVID-19 in immunocompromised patient 02/26/2024    Cystitis 05/10/2022    Depression     Elevated troponin 05/13/2025    Encounter for blood transfusion     Encounter for palliative care 10/14/2024     Endotracheally intubated 04/28/2025    Fibromyalgia     on Lyrica    Heart failure     native heart cardiomyopathy    Heart transplanted 1993    due to cardiomyopathy    History of hyperparathyroidism; Hyperparathyroidism, secondary renal     PT DENIES    History of left breast cancer 2021    rad and chemo    Hypertension     Immune disorder     anti rejection meds    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    Impaired mobility 07/28/2022    Iron deficiency anemia 08/15/2017    Kidney stones     passed per pt    Lower extremity edema 12/11/2024    Multifactoral: CKD stage 5, CHF, venous insufficiency      Obesity     Obesity (BMI 30.0-34.9) 07/22/2019    Other osteoporosis without current pathological fracture 08/30/2019    Other pancytopenia 05/06/2024    Palliative care encounter 04/30/2025    Parotitis, acute 09/19/2023    Pharyngitis 01/09/2019    Pneumonia due to infectious organism 03/14/2024    PONV (postoperative nausea and vomiting)     Rapid palpitations 10/16/2024    Severe sepsis 11/22/2021    Shingles 2003 approx    left leg    Stage 4 chronic kidney disease 11/22/2021    Subclinical hypothyroidism 06/16/2023    Syncope 05/13/2025    Thrombocytopenia, unspecified 11/29/2021    Trouble in sleeping     Unresponsiveness 11/13/2024    Urinary incontinence      Past Surgical History:   Procedure Laterality Date    ABLATION OF MEDIAL BRANCH NERVE OF LUMBAR SPINE FACET JOINT Bilateral 6/23/2025    Procedure: Bilateral L4-5 and L5-S1 RFA;  Surgeon: Jassi Pierre MD;  Location: [facility];  Service: Pain Management;  Laterality: Bilateral;    bladder implant Right 06/11/2024    BLADDER SURGERY  2015 approx    mesh - Dr Everett then 2nd reconstructive sx Dr Onofre    BREAST BIOPSY Bilateral     NEGATIVE    BREAST BIOPSY Right 10/31/2022    benign    BREAST LUMPECTOMY Left 2021    rad and chemo    BREAST SURGERY Left 09/28/2015    Bx - benign    BREAST SURGERY Right 12/2015    Bx  benign    CARDIAC PACEMAKER REMOVAL Left 06/26/2014    Pacer defirillator removed. Put in 1993 aat time of heart transplant    CARPAL TUNNEL RELEASE Left 03/03/2015    Dr. Hall    COLONOSCOPY N/A 02/25/2021    Procedure: COLONOSCOPY;  Surgeon: Freida Ramirez MD;  Location: [facility] ENDO;  Service: Endoscopy;  Laterality: N/A;    CYSTOCELE REPAIR      Twice with mesh removal    ECHOCARDIOGRAM,TRANSESOPHAGEAL N/A 04/26/2025    Procedure: Transesophageal echo (AYAAN) intra-procedure log documentation;  Surgeon: Barron Chinchilla MD;  Location: [address];  Service: Cardiothoracic;  Laterality: N/A;    EPIDURAL STEROID INJECTION INTO CERVICAL SPINE N/A 02/02/2023    Procedure: T11/T12 IL HELLEN;  Surgeon: Jassi Pierre MD;  Location: [facility] PAIN MGT;  Service: Pain Management;  Laterality: N/A;    EPIDURAL STEROID INJECTION INTO CERVICAL SPINE N/A 09/16/2024    Procedure: T11/T12 IL HELLEN;  Surgeon: Jassi Pierre MD;  Location: [facility] PAIN [facility]GT;  Service: Pain Management;  Laterality: N/A;    HEART TRANSPLANT  1993    HERNIA REPAIR Right 1971 approx    Inguinal    HYSTERECTOMY  1983    vag hyst /LSO     IMPLANTATION OF PERMANENT SACRAL NERVE STIMULATOR N/A 06/11/2024    Procedure: INSERTION, NEUROSTIMULATOR, PERMANENT, SACRAL;  Surgeon: Sami Cochran MD;  Location: [facility] OR;  Service: Urology;  Laterality: N/A;    INCISION AND DRAINAGE OF ABSCESS Left 12/24/2021    Procedure: INCISION AND DRAINAGE, ABSCESS;  Surgeon: Joseph Longo MD;  Location: [facility] OR;  Service: General;  Laterality: Left;    INJECTION OF ANESTHETIC AGENT AROUND MEDIAL BRANCH NERVES INNERVATING LUMBAR FACET JOINT Right 10/19/2022    Procedure: Right L4/L5 and L5/S1 MBB;  Surgeon: Jassi Pierre MD;  Location: [facility] PAIN [facility]GT;  Service: Pain Management;  Laterality: Right;    INJECTION OF ANESTHETIC AGENT AROUND MEDIAL BRANCH NERVES INNERVATING LUMBAR FACET JOINT Right 11/09/2022    Procedure: Right L4/L5 and L5/S1 MBB;  Surgeon: Jassi  MD Ignacio;  [facility];  Service: Pain Management;  Laterality: Right;    INJECTION OF ANESTHETIC AGENT AROUND MEDIAL BRANCH NERVES INNERVATING LUMBAR FACET JOINT Left 02/06/2025    Procedure: Left L4-5 and L5-S1 MBB #1 with local;  Surgeon: Jassi Pierre MD;  Location: [facility];  Service: Pain Management;  Laterality: Left;    INJECTION OF ANESTHETIC AGENT AROUND MEDIAL BRANCH NERVES INNERVATING LUMBAR FACET JOINT Left 03/19/2025    Procedure: Left L4-5 and L5-S1 MBB #2 with local;  Surgeon: Jassi Pierre MD;  Location: [facility];  Service: Pain Management;  Laterality: Left;    INJECTION OF ANESTHETIC AGENT INTO SACROILIAC JOINT Right 08/22/2022    Procedure: Right SIJ Injection Right L5/S1 Facte Injection;  Surgeon: Jassi Pierre MD;  Location: [facility] PAIN MGT;  Service: Pain Management;  Laterality: Right;    INSERTION OF TUNNELED CENTRAL VENOUS CATHETER (CVC) WITH SUBCUTANEOUS PORT N/A 11/09/2021    Procedure: [id number];  Surgeon: Christoph Douglas MD;  Location: [facility] OR;  Service: General;  Laterality: N/A;    RADIOFREQUENCY ABLATION Right 12/05/2024    Procedure: Right L4-5 and L5-S1 RFA;  Surgeon: Jassi Pierre MD;  Location: [facility] PAIN [facility]GT;  Service: Pain Management;  Laterality: Right;    RADIOFREQUENCY THERMOCOAGULATION Right 12/07/2022    Procedure: Right L4/L5 and L5/S1 Lumbar RFA;  Surgeon: Jassi Pierre MD;  Location: [facility] PAIN MGT;  Service: Pain Management;  Laterality: Right;    REMOVAL OF ELECTRODE LEAD OF SACRAL NERVE STIMULATOR N/A 02/18/2025    Procedure: REMOVAL, ELECTRODE LEAD, SACRAL NERVE STIMULATOR;  Surgeon: Sami Cochran MD;  Location: [facility] OR;  Service: Urology;  Laterality: N/A;    REMOVAL OF VASCULAR ACCESS PORT      ROBOT-ASSISTED LAPAROSCOPIC ABDOMINAL SACROCOLPOPEXY N/A 08/10/2023    Procedure: ROBOTIC SACROCOLPOPEXY, ABDOMEN;  Surgeon: PRANAY Villalobos MD;  Location: [facility] OR;  Service: OB/GYN;  Laterality: N/A;     ROBOT-ASSISTED LAPAROSCOPIC OOPHORECTOMY Right 08/10/2023    Procedure: ROBOTIC OOPHORECTOMY;  Surgeon: PRANAY Villalobos MD;  Location: [facility] OR;  Service: OB/GYN;  Laterality: Right;    SENTINEL LYMPH NODE BIOPSY Left 10/12/2021    Procedure: BIOPSY, LYMPH NODE, SENTINEL;  Surgeon: Christoph Douglas MD;  Location: [facility] OR;  Service: General;  Laterality: Left;    TOE SURGERY      XI ROBOTIC URETHROPEXY N/A 08/10/2023    Procedure: XI ROBOTIC URETHROPEXY;  Surgeon: PRANAY Villalobos MD;  Location: [facility] OR;  Service: OB/GYN;  Laterality: N/A;     Social History     Socioeconomic History    Marital status: Single    Number of children: 2    Highest education level: 11th grade   Occupational History    Occupation: Retired   Tobacco Use    Smoking status: Never     Passive exposure: Never    Smokeless tobacco: Never   Substance and Sexual Activity    Alcohol use: Never     Alcohol/week: 0.0 standard drinks of alcohol    Drug use: No    Sexual activity: Not Currently     Partners: Male     Birth control/protection: See Surgical Hx   Other Topics Concern    Are you pregnant or think you may be? No    Breast-feeding No   Social History Narrative    Single. 2 children , 1  at 30 yoa  2014 strep throat -  pneumonia and renal complications after not completing course of AB. Other child lives in [place], [place]. Has a cousin locally that could help in an emergency. 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 Caffeine intake =- 1 cola a day. No coffee, + occasional tea, avoids caffeine especially at night. Still drives. She does not have a Living Will or Advanced directive.      Social Drivers of Health     Financial Resource Strain: Low Risk  (2025)    Overall Financial Resource Strain (CARDIA)     Difficulty of Paying Living Expenses: Not hard at all   Recent Concern: Financial Resource Strain - Medium Risk (2025)    Overall Financial Resource Strain (CARDIA)     Difficulty of Paying  Living Expenses: Somewhat hard   Food Insecurity: No Food Insecurity (5/17/2025)    Hunger Vital Sign     Worried About Running Out of Food in the Last Year: Never true     Ran Out of Food in the Last Year: Never true   Transportation Needs: No Transportation Needs (5/17/2025)    PRAPARE - Transportation     Lack of Transportation (Medical): No     Lack of Transportation (Non-Medical): No   Recent Concern: Transportation Needs - Unmet Transportation Needs (5/2/2025)    PRAPARE - Transportation     Lack of Transportation (Medical): Yes     Lack of Transportation (Non-Medical): No   Physical Activity: Inactive (5/17/2025)    Exercise Vital Sign     Days of Exercise per Week: 0 days     Minutes of Exercise per Session: 0 min   Stress: No Stress Concern Present (5/17/2025)    [employer] - Occupational Stress Questionnaire     Feeling of Stress : Only a little   Recent Concern: Stress - Stress Concern Present (5/13/2025)    [employer] - Occupational Stress Questionnaire     Feeling of Stress : To some extent   Housing Stability: High Risk (5/17/2025)    Housing Stability Vital Sign     Unable to Pay for Housing in the Last Year: No     Number of Times Moved in the Last Year: 4     Homeless in the Last Year: No     Family History   Problem Relation Name Age of Onset    Cancer Mother  38        breast    Breast cancer Mother      Breast cancer Maternal Grandmother      Heart disease Maternal Grandmother      Hypertension Son      Cataracts Cousin      Diabetes Neg Hx      Stroke Neg Hx      Kidney disease Neg Hx      Asthma Neg Hx      COPD Neg Hx      Melanoma Neg Hx      Hyperlipidemia Neg Hx         Review of patient's allergies indicates:   Allergen Reactions    Dobutamine Other (See Comments)     Severe Left sided chest pain after dosage administered for Dobutamine Stress Test; itching    Lisinopril Swelling and Rash    Hydrocodone-acetaminophen Nausea Only     Augmentin [amoxicillin-pot clavulanate] Diarrhea    Zyvox [linezolid] Nausea And Vomiting       Current Outpatient Medications   Medication Sig    calcitRIOL (ROCALTROL) 0.25 MCG Cap Take 1 capsule (0.25 mcg total) by mouth once daily.    carvediloL (COREG) 3.125 MG tablet TAKE 1 TABLET TWICE DAILY WITH MEALS    cycloSPORINE (NEORAL) 100 mg/mL microemulsion solution Take 0.5 mLs (50 mg total) by mouth every 12 (twelve) hours.    furosemide (LASIX) 40 MG tablet Take ½ tablet (20 mg total) by mouth once daily.    isosorbide mononitrate (IMDUR) 60 MG 24 hr tablet Take 1 tablet (60 mg total) by mouth once daily.    levothyroxine (SYNTHROID) 25 MCG tablet Take 1 tablet (25 mcg total) by mouth before breakfast.    LIDOcaine (LIDODERM) 5 % Place 1 patch onto the skin daily as needed (RIb Pain). Place patch over the affected area for up to 12 hours.  Please wait 12 hours before placing a new patch.    NIFEdipine (PROCARDIA-XL) 60 MG (OSM) 24 hr tablet Take 1 tablet (60 mg total) by mouth 2 (two) times a day.    nitroGLYCERIN (NITROSTAT) 0.4 MG SL tablet PLACE 1 TABLET UNDER THE TONGUE EVERY 5 MINS AS NEEDED FOR CHEST PAIN FOR UP TO 3 DOSES.IF CONTINUED HEART PAIN/PRESSURE AFTER    ondansetron (ZOFRAN) 4 MG tablet TAKE 1 TABLET EVERY 12 HOURS AS NEEDED FOR NAUSEA    oxyCODONE-acetaminophen (PERCOCET) 5-325 mg per tablet Take 1 tablet by mouth every 8 (eight) hours as needed for Pain.    pantoprazole (PROTONIX) 40 MG tablet Take 1 tablet (40 mg total) by mouth once daily.    polyethylene glycol (GLYCOLAX) 17 gram PwPk Take 17 g by mouth once daily.    prednisoLONE acetate (PRED FORTE) 1 % DrpS Place 1 drop into the right eye 4 (four) times daily.    rosuvastatin (CRESTOR) 5 MG tablet Take 1 tablet (5 mg total) by mouth once daily.    sertraline (ZOLOFT) 25 MG tablet Take 1 tablet (25 mg total) by mouth once daily.    sodium bicarbonate 650 MG tablet Take 1 tablet (650 mg total) by mouth 2 (two) times daily.    temazepam  (RESTORIL) 30 mg capsule Take 1 capsule (30 mg total) by mouth nightly as needed for Insomnia.    tiZANidine (ZANAFLEX) 2 MG tablet Take 2 tablets (4 mg total) by mouth 2 (two) times daily as needed (Back Pain).    traZODone (DESYREL) 100 MG tablet Take 1 tablet (100 mg total) by mouth nightly as needed for Insomnia.    vitamin E 400 UNIT capsule Take 400 Units by mouth once daily.     No current facility-administered medications for this visit.         ROS  Review of Systems   Constitutional:  Negative for chills, diaphoresis, fatigue and fever.   Respiratory:  Negative for chest tightness, shortness of breath, wheezing and stridor.    Cardiovascular:  Positive for chest pain and leg swelling.   Gastrointestinal:  Negative for blood in stool, diarrhea, nausea and vomiting.   Endocrine: Negative for cold intolerance and heat intolerance.   Genitourinary:  Positive for urgency. Negative for dysuria and hematuria.   Musculoskeletal:  Positive for arthralgias, back pain, gait problem and joint swelling. Negative for myalgias, neck pain and neck stiffness.   Skin:  Negative for rash.   Neurological:  Positive for weakness and numbness. Negative for tremors, seizures, speech difficulty, light-headedness and headaches.   Hematological:  Does not bruise/bleed easily.   Psychiatric/Behavioral:  Negative for agitation, confusion and suicidal ideas. The patient is not nervous/anxious.             OBJECTIVE:  /70   Pulse 93   Resp 17   Ht 5' (1.524 m)   Wt 65.2 kg (143 lb 11.8 oz)   LMP 06/20/1983 (Within Years)   BMI 28.07 kg/m²         Physical Exam  Constitutional:       General: She is not in acute distress.     Appearance: Normal appearance. She is not ill-appearing.   HENT:      Head: Normocephalic and atraumatic.      Nose: No congestion or rhinorrhea.      Mouth/Throat:      Mouth: Mucous membranes are moist.   Eyes:      Extraocular Movements: Extraocular movements intact.      Pupils: Pupils are equal,  round, and reactive to light.   Pulmonary:      Effort: Pulmonary effort is normal.      Comments: Tenderness to palpation along the bilateral T5 and T6 ribs  Abdominal:      General: Abdomen is flat. There is no distension.      Palpations: There is no mass.      Tenderness: There is no abdominal tenderness. There is no guarding.   Skin:     General: Skin is warm and dry.      Capillary Refill: Capillary refill takes less than 2 seconds.   Neurological:      General: No focal deficit present.      Mental Status: She is alert and oriented to person, place, and time.      Cranial Nerves: No cranial nerve deficit.      Sensory: No sensory deficit.      Motor: No weakness or abnormal muscle tone.      Gait: Gait abnormal (slight right foot drop).      Deep Tendon Reflexes: Babinski sign absent on the right side. Babinski sign absent on the left side.      Reflex Scores:       Patellar reflexes are 2+ on the right side and 2+ on the left side.       Achilles reflexes are 2+ on the right side and 2+ on the left side.  Psychiatric:         Mood and Affect: Mood normal.         Behavior: Behavior normal.         Thought Content: Thought content normal.           Musculoskeletal:      Thoracic Exam  Incision: no  Pain with Flexion: yes  Pain with Extension: yes  Paraspinous TTP:  Positive on the right  Facet TTP:  T11-12  ROM:  Decreased        LABS:  Lab Results   Component Value Date    WBC 3.61 (L) 07/23/2025    HGB 9.0 (L) 07/23/2025    HCT 29.0 (L) 07/23/2025    MCV 93 07/23/2025     07/23/2025       CMP  Sodium   Date Value Ref Range Status   07/23/2025 143 136 - 145 mmol/L Final   02/24/2025 142 136 - 145 mmol/L Final   02/24/2025 142 136 - 145 mmol/L Final     Potassium   Date Value Ref Range Status   07/23/2025 4.6 3.5 - 5.1 mmol/L Final   02/24/2025 4.7 3.5 - 5.1 mmol/L Final   02/24/2025 4.7 3.5 - 5.1 mmol/L Final     Chloride   Date Value Ref Range Status   07/23/2025 109 95 - 110 mmol/L Final    02/24/2025 105 95 - 110 mmol/L Final   02/24/2025 105 95 - 110 mmol/L Final     CO2   Date Value Ref Range Status   07/23/2025 25 23 - 29 mmol/L Final   02/24/2025 21 (L) 23 - 29 mmol/L Final   02/24/2025 21 (L) 23 - 29 mmol/L Final     Glucose   Date Value Ref Range Status   07/23/2025 88 70 - 110 mg/dL Final   02/24/2025 76 70 - 110 mg/dL Final   02/24/2025 76 70 - 110 mg/dL Final     BUN   Date Value Ref Range Status   07/23/2025 31 (H) 8 - 23 mg/dL Final     Creatinine   Date Value Ref Range Status   07/23/2025 3.1 (H) 0.5 - 1.4 mg/dL Final     Calcium   Date Value Ref Range Status   07/23/2025 9.0 8.7 - 10.5 mg/dL Final   02/24/2025 9.7 8.7 - 10.5 mg/dL Final   02/24/2025 9.7 8.7 - 10.5 mg/dL Final     Protein Total   Date Value Ref Range Status   07/23/2025 7.8 6.0 - 8.4 gm/dL Final     Total Protein   Date Value Ref Range Status   02/24/2025 7.9 6.0 - 8.4 g/dL Final     Albumin   Date Value Ref Range Status   07/23/2025 3.5 3.5 - 5.2 g/dL Final   02/24/2025 3.3 (L) 3.5 - 5.2 g/dL Final   02/24/2025 3.3 (L) 3.5 - 5.2 g/dL Final     Total Bilirubin   Date Value Ref Range Status   02/24/2025 0.4 0.1 - 1.0 mg/dL Final     Comment:     For infants and newborns, interpretation of results should be based  on gestational age, weight and in agreement with clinical  observations.    Premature Infant recommended reference ranges:  Up to 24 hours.............<8.0 mg/dL  Up to 48 hours............<12.0 mg/dL  3-5 days..................<15.0 mg/dL  6-29 days.................<15.0 mg/dL       Bilirubin Total   Date Value Ref Range Status   07/23/2025 0.4 0.1 - 1.0 mg/dL Final     Comment:     For infants and newborns, interpretation of results should be based   on gestational age, weight and in agreement with clinical   observations.    Premature Infant recommended reference ranges:   0-24 hours:  <8.0 mg/dL   24-48 hours: <12.0 mg/dL   3-5 days:    <15.0 mg/dL   6-29 days:   <15.0 mg/dL     Alkaline Phosphatase   Date  Value Ref Range Status   02/24/2025 120 40 - 150 U/L Final     ALP   Date Value Ref Range Status   07/23/2025 153 (H) 40 - 150 unit/L Final     AST   Date Value Ref Range Status   07/23/2025 43 11 - 45 unit/L Final   02/24/2025 42 (H) 10 - 40 U/L Final     ALT   Date Value Ref Range Status   07/23/2025 27 10 - 44 unit/L Final   02/24/2025 28 10 - 44 U/L Final     Anion Gap   Date Value Ref Range Status   07/23/2025 9 8 - 16 mmol/L Final     eGFR if    Date Value Ref Range Status   07/14/2022 19 (A) >60 mL/min/1.73 m^2 Final     eGFR if non    Date Value Ref Range Status   07/14/2022 17 (A) >60 mL/min/1.73 m^2 Final     Comment:     Calculation used to obtain the estimated glomerular filtration  rate (eGFR) is the CKD-EPI equation.          Lab Results   Component Value Date    HGBA1C 5.1 07/02/2024             ASSESSMENT:       70 y.o. year old female with lower back pain, consistent with     1. Thoracic radiculopathy  Case Request-RAD/Other Procedure Area: T11-12 interlaminar epidural steroid injection right paramedian approach      2. Contusion of rib, unspecified laterality, sequela  tiZANidine (ZANAFLEX) 2 MG tablet    oxyCODONE-acetaminophen (PERCOCET) 5-325 mg per tablet    LIDOcaine (LIDODERM) 5 %      3. Thoracic spondylosis  tiZANidine (ZANAFLEX) 2 MG tablet    oxyCODONE-acetaminophen (PERCOCET) 5-325 mg per tablet    LIDOcaine (LIDODERM) 5 %      4. Lumbar spondylosis  tiZANidine (ZANAFLEX) 2 MG tablet    oxyCODONE-acetaminophen (PERCOCET) 5-325 mg per tablet    LIDOcaine (LIDODERM) 5 %                  Thoracic radiculopathy  -     Case Request-RAD/Other Procedure Area: T11-12 interlaminar epidural steroid injection right paramedian approach    Contusion of rib, unspecified laterality, sequela  -     tiZANidine (ZANAFLEX) 2 MG tablet; Take 2 tablets (4 mg total) by mouth 2 (two) times daily as needed (Back Pain).  Dispense: 60 tablet; Refill: 1  -      oxyCODONE-acetaminophen (PERCOCET) 5-325 mg per tablet; Take 1 tablet by mouth every 8 (eight) hours as needed for Pain.  Dispense: 21 tablet; Refill: 0  -     LIDOcaine (LIDODERM) 5 %; Place 1 patch onto the skin daily as needed (RIb Pain). Place patch over the affected area for up to 12 hours.  Please wait 12 hours before placing a new patch.  Dispense: 30 patch; Refill: 0    Thoracic spondylosis  -     tiZANidine (ZANAFLEX) 2 MG tablet; Take 2 tablets (4 mg total) by mouth 2 (two) times daily as needed (Back Pain).  Dispense: 60 tablet; Refill: 1  -     oxyCODONE-acetaminophen (PERCOCET) 5-325 mg per tablet; Take 1 tablet by mouth every 8 (eight) hours as needed for Pain.  Dispense: 21 tablet; Refill: 0  -     LIDOcaine (LIDODERM) 5 %; Place 1 patch onto the skin daily as needed (RIb Pain). Place patch over the affected area for up to 12 hours.  Please wait 12 hours before placing a new patch.  Dispense: 30 patch; Refill: 0    Lumbar spondylosis  -     tiZANidine (ZANAFLEX) 2 MG tablet; Take 2 tablets (4 mg total) by mouth 2 (two) times daily as needed (Back Pain).  Dispense: 60 tablet; Refill: 1  -     oxyCODONE-acetaminophen (PERCOCET) 5-325 mg per tablet; Take 1 tablet by mouth every 8 (eight) hours as needed for Pain.  Dispense: 21 tablet; Refill: 0  -     LIDOcaine (LIDODERM) 5 %; Place 1 patch onto the skin daily as needed (RIb Pain). Place patch over the affected area for up to 12 hours.  Please wait 12 hours before placing a new patch.  Dispense: 30 patch; Refill: 0                       PLAN:   - Interventions:   - schedule patient for T11-12 interlaminar epidural steroid injection for thoracic radiculopathy.  Patient may continue aspirin.    - 06/23/2025: Bilateral L4-5 and L5-S1 RFA, 80% relief  02/02/2023:  T11-12 interlaminar epidural steroid injection, 100% relief  -12/7/22:  Right L4-5 and L5-S1 radiofrequency ablation, 75% relief  -8/22/22:  Right sacroiliac joint and right L5-S1 facet injection,    Resolution of right lower extremity pain    - Anticoagulation use:   Aspirin 81mg does not need to hold for lumbar MBB    - Medications:   - refill Lidoderm patches daily.   - refill tizanidine 2 mg twice a day p.r.n.   - refill oxycodone 5 mg twice a day p.r.n.      - Therapy:    Continue formal physical therapy    - Imaging:   CT of thoracic spine reviewed.  Significant for right eccentric disc bulge at T11-12 with foraminal stenosis   X-ray Lumbar  reviewed. Significant for grade 1 anterolisthesis of L4 upon L5.  Degenerative disc disease at T11-T12.  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 Patient is in agreement with the above and verbalized understanding.    I discussed the goals of interventional chronic pain management with the patient on today's visit.  I explained the utility of injections for diagnostic and therapeutic purposes.  We discussed a multimodal approach to pain including treating the patient's given worst pain at any given time.  We will use a systematic approach to addressing pain.  We will also adopt a multimodal approach that includes injections, adjuvant medications, physical therapy, at times psychiatry.  There may be a limited role for opioid use intermittently in the treatment of pain, more particularly for acute pain although no one approach can be used as a sole treatment modality.    I emphasized the importance of regular exercise, core strengthening and stretching, diet and weight loss as a cornerstone of long-term  pain management.      Jassi Pierre MD  Interventional Pain Management  [facility]    Disclaimer:  This note was prepared using voice recognition system and is likely to have sound alike errors that may have been overlooked even after proof reading.  Please call me with any questions            I spent a total of 20 minutes on the day of the visit.  This includes face to face time and non-face to face time preparing to see the patient (eg, review of tests), obtaining and/or reviewing separately obtained history, documenting clinical information in the electronic or other health record, independently interpreting results and communicating results to the patient/family/caregiver, or care coordinator.

## 2025-07-31 ENCOUNTER — INFUSION (OUTPATIENT)
Dept: INFUSION THERAPY | Facility: HOSPITAL | Age: 70
End: 2025-07-31
Attending: INTERNAL MEDICINE
Payer: MEDICARE

## 2025-07-31 ENCOUNTER — TELEPHONE (OUTPATIENT)
Dept: CARDIOLOGY | Facility: CLINIC | Age: 70
End: 2025-07-31
Payer: MEDICARE

## 2025-07-31 ENCOUNTER — TELEPHONE (OUTPATIENT)
Dept: OPHTHALMOLOGY | Facility: CLINIC | Age: 70
End: 2025-07-31
Payer: MEDICARE

## 2025-07-31 VITALS — DIASTOLIC BLOOD PRESSURE: 73 MMHG | HEART RATE: 85 BPM | SYSTOLIC BLOOD PRESSURE: 141 MMHG | TEMPERATURE: 98 F

## 2025-07-31 DIAGNOSIS — D63.1 ANEMIA ASSOCIATED WITH STAGE 5 CHRONIC RENAL FAILURE: ICD-10-CM

## 2025-07-31 DIAGNOSIS — N18.5 ANEMIA ASSOCIATED WITH STAGE 5 CHRONIC RENAL FAILURE: ICD-10-CM

## 2025-07-31 DIAGNOSIS — M85.88 OSTEOPENIA OF LUMBAR SPINE: Primary | ICD-10-CM

## 2025-07-31 PROCEDURE — 63600175 PHARM REV CODE 636 W HCPCS: Mod: EC,TB,HCNC | Performed by: NURSE PRACTITIONER

## 2025-07-31 PROCEDURE — 96372 THER/PROPH/DIAG INJ SC/IM: CPT | Mod: HCNC

## 2025-07-31 RX ADMIN — EPOETIN ALFA-EPBX 20000 UNITS: 20000 INJECTION, SOLUTION INTRAVENOUS; SUBCUTANEOUS at 08:07

## 2025-07-31 NOTE — DISCHARGE INSTRUCTIONS
[facility]  [address] [address]  [address]  [phone] phone     [phone] fax  Hours of Operation: Monday- Friday 8:00am- 5:00pm  After hours phone  [phone]  Hematology / Oncology Physicians on call      SALENA Thurman Dr., NP Phaon Dunbar, NP Khelsea Conley, FNP    Please call with any concerns regarding your appointment today.

## 2025-07-31 NOTE — TELEPHONE ENCOUNTER
Copied from CRM #[id number]. Topic: General Inquiry - Return Call  >> Jul 31, 2025  2:28 PM Farhan wrote:  Type: Patient Call Back    Who called:pt    What is the request in detail:pt would like a call back in regards to needing to be off of certain medication due to having cataract surgery on 8/6  Can the clinic reply by [facility]?    Would the patient rather a call back or a response via [facility]? Call back    Best call back number:Telephone Information:  Mobile          [phone]    Additional Information:

## 2025-07-31 NOTE — TELEPHONE ENCOUNTER
Spoke to pt and she was cleared to stop the ASA and she told her PCP and they said it was okay. I also told her that she could arrive for 7 AM to [facility] since she had to have her transportation service  booked prior the surgery

## 2025-07-31 NOTE — TELEPHONE ENCOUNTER
Left message for pt to call myself or Marcelo back in the ophtha surgery dept- I was seeing if she was cleared to stop the Asprin

## 2025-08-05 ENCOUNTER — LAB VISIT (OUTPATIENT)
Dept: LAB | Facility: HOSPITAL | Age: 70
End: 2025-08-05
Attending: INTERNAL MEDICINE
Payer: MEDICARE

## 2025-08-05 ENCOUNTER — OFFICE VISIT (OUTPATIENT)
Dept: PRIMARY CARE CLINIC | Facility: CLINIC | Age: 70
End: 2025-08-05
Payer: MEDICARE

## 2025-08-05 VITALS
SYSTOLIC BLOOD PRESSURE: 138 MMHG | DIASTOLIC BLOOD PRESSURE: 70 MMHG | WEIGHT: 143.06 LBS | OXYGEN SATURATION: 97 % | HEIGHT: 60 IN | BODY MASS INDEX: 28.09 KG/M2 | HEART RATE: 63 BPM | TEMPERATURE: 97 F

## 2025-08-05 DIAGNOSIS — E78.49 OTHER HYPERLIPIDEMIA: Primary | Chronic | ICD-10-CM

## 2025-08-05 DIAGNOSIS — F41.8 DEPRESSION WITH ANXIETY: Primary | ICD-10-CM

## 2025-08-05 DIAGNOSIS — Z94.1 S/P ORTHOTOPIC HEART TRANSPLANT: Chronic | ICD-10-CM

## 2025-08-05 DIAGNOSIS — M48.07 SPINAL STENOSIS OF LUMBOSACRAL REGION: Chronic | ICD-10-CM

## 2025-08-05 DIAGNOSIS — R05.3 CHRONIC COUGH: ICD-10-CM

## 2025-08-05 DIAGNOSIS — E03.9 ACQUIRED HYPOTHYROIDISM: ICD-10-CM

## 2025-08-05 DIAGNOSIS — D84.821 IMMUNOCOMPROMISED STATE DUE TO DRUG THERAPY: Chronic | ICD-10-CM

## 2025-08-05 DIAGNOSIS — R00.2 PALPITATIONS: ICD-10-CM

## 2025-08-05 DIAGNOSIS — N18.4 CKD (CHRONIC KIDNEY DISEASE), STAGE IV: Chronic | ICD-10-CM

## 2025-08-05 DIAGNOSIS — E03.9 ACQUIRED HYPOTHYROIDISM: Chronic | ICD-10-CM

## 2025-08-05 DIAGNOSIS — H25.9 AGE-RELATED CATARACT OF BOTH EYES, UNSPECIFIED AGE-RELATED CATARACT TYPE: Chronic | ICD-10-CM

## 2025-08-05 DIAGNOSIS — Z79.899 IMMUNOCOMPROMISED STATE DUE TO DRUG THERAPY: Chronic | ICD-10-CM

## 2025-08-05 PROBLEM — Z01.810 PREOP CARDIOVASCULAR EXAM: Status: RESOLVED | Noted: 2025-07-16 | Resolved: 2025-08-05

## 2025-08-05 PROBLEM — J02.9 PHARYNGITIS: Status: RESOLVED | Noted: 2025-07-22 | Resolved: 2025-08-05

## 2025-08-05 LAB
T4 FREE SERPL-MCNC: 0.98 NG/DL (ref 0.71–1.51)
TSH SERPL-ACNC: 10.27 UIU/ML (ref 0.4–4)

## 2025-08-05 PROCEDURE — 3008F BODY MASS INDEX DOCD: CPT | Mod: CPTII,HCNC,S$GLB, | Performed by: INTERNAL MEDICINE

## 2025-08-05 PROCEDURE — 84439 ASSAY OF FREE THYROXINE: CPT | Mod: HCNC

## 2025-08-05 PROCEDURE — 36415 COLL VENOUS BLD VENIPUNCTURE: CPT | Mod: HCNC

## 2025-08-05 PROCEDURE — 1159F MED LIST DOCD IN RCRD: CPT | Mod: CPTII,HCNC,S$GLB, | Performed by: INTERNAL MEDICINE

## 2025-08-05 PROCEDURE — 99499 UNLISTED E&M SERVICE: CPT | Mod: HCNC,S$GLB,,

## 2025-08-05 PROCEDURE — 99417 PROLNG OP E/M EACH 15 MIN: CPT | Mod: HCNC,S$GLB,, | Performed by: INTERNAL MEDICINE

## 2025-08-05 PROCEDURE — 3066F NEPHROPATHY DOC TX: CPT | Mod: CPTII,HCNC,S$GLB, | Performed by: INTERNAL MEDICINE

## 2025-08-05 PROCEDURE — 3075F SYST BP GE 130 - 139MM HG: CPT | Mod: CPTII,HCNC,S$GLB, | Performed by: INTERNAL MEDICINE

## 2025-08-05 PROCEDURE — 99999 PR PBB SHADOW E&M-EST. PATIENT-LVL IV: CPT | Mod: PBBFAC,HCNC,, | Performed by: INTERNAL MEDICINE

## 2025-08-05 PROCEDURE — 3288F FALL RISK ASSESSMENT DOCD: CPT | Mod: CPTII,HCNC,S$GLB, | Performed by: INTERNAL MEDICINE

## 2025-08-05 PROCEDURE — 84443 ASSAY THYROID STIM HORMONE: CPT | Mod: HCNC

## 2025-08-05 PROCEDURE — 99215 OFFICE O/P EST HI 40 MIN: CPT | Mod: HCNC,S$GLB,, | Performed by: INTERNAL MEDICINE

## 2025-08-05 PROCEDURE — 1160F RVW MEDS BY RX/DR IN RCRD: CPT | Mod: CPTII,HCNC,S$GLB, | Performed by: INTERNAL MEDICINE

## 2025-08-05 PROCEDURE — 1157F ADVNC CARE PLAN IN RCRD: CPT | Mod: CPTII,HCNC,S$GLB, | Performed by: INTERNAL MEDICINE

## 2025-08-05 PROCEDURE — 3078F DIAST BP <80 MM HG: CPT | Mod: CPTII,HCNC,S$GLB, | Performed by: INTERNAL MEDICINE

## 2025-08-05 PROCEDURE — 1101F PT FALLS ASSESS-DOCD LE1/YR: CPT | Mod: CPTII,HCNC,S$GLB, | Performed by: INTERNAL MEDICINE

## 2025-08-05 NOTE — PROGRESS NOTES
Nadia Damon  08/05/2025  [id number]    Elis Wick MD  Patient Care Team:  Elis Wick MD as PCP - General (Internal Medicine)  Miguel Soni Jr., MD as Consulting Physician (Vascular Surgery)  Ike King MD as Consulting Physician (Cardiology)  Courtney Tubbs MD as Consulting Physician (Cardiology)  Carter Crawford MD as Consulting Physician (Nephrology)  Paxton Vasques OD as Consulting Physician (Optometry)  PRANAY Villalobos MD as Obstetrician (Obstetrics)  Ike King MD as Consulting Physician (Cardiology)  Jose Roland MD as Consulting Physician (Dermatology)  Prasanth Johnson MD as Consulting Physician (Rheumatology)  Elis Wick MD as Physician (Internal Medicine)  Lexi Bianchi LCSW as  (Hematology and Oncology)  Kelsie Burk PA-C as Physician Assistant (Hematology and Oncology)  Chelsea Monte as ED Navigator  Anna Regalado LMSW as  (Hematology and Oncology)    Visit Type: Follow-up     Ms. Damon is a 69 year old female here for scheduled f/u     Ms. Damon w h/o heart transplant 1993, on anti-rejection medication.   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   She was treated with 1 cycle of systemic chemotherapy cytoxan and taxotere and udenyca that was discontinued due to toxicity.   She was followed by radiation oncology and completed last radiation treatment 5/14/22.   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       Ms. Damon suffered CVA 7/19/24 with subsequent hospitalization then transfer to [facility] to address on-going dysarthria and R-sided weakness. She also has severe multi-level spinal stenosis for which she is followed by Pain Management.     Other chronic medical issues include depresssion/anxiety/insomnia, hypertension,  chronic diastolic CHF, ventral abdominal hernia, neurogenic bladder, and h/o osteoporosis for which she had been receiving Prolia.      7/17/25 echocardiogram and nuclear cardiac stress test reassuring  Just finished cardiac monitoring mailed back monitor this morning    Scheduled for right cataract surgery tomorrow, left next week.    History of Present Illness    CHIEF COMPLAINT:  Ms. Damon presents today for follow up and check-up.    CHRONIC KIDNEY DISEASE:  She is between Stage four and five chronic kidney disease. She is compliant with daily Veltassa,reports insurance wants to switch to an alternate medication.    MUSCULOSKELETAL PAIN:  She reports ongoing right sided back pain. She is scheduled for HELLEN 8/28/25. She takes tizanidine during the day for pain management.    LOWER EXTREMITY EDEMA:  She reports significant lower extremity swelling that impedes clothing wear. She uses support hose at home for management.    RESPIRATORY:  She reports chronic dry, persistent cough primarily occurring at night. The cough is nonproductive, described as dry and hacking, lasting throughout the night. She manages symptoms with honey and lemon. She denies fever, chest pain, or shortness of breath. No impact on daily activities noted.    CARDIAC:  She experiences occasional palpitations, particularly during prolonged coughing episodes. She has been practicing recommended breathing techniques for symptom management.    SLEEP:  She reports improved sleep quality with current medication, noting ability to rest even with early morning disruptions.    ROS:  General: -fever, -chills, -fatigue, -weight gain, -weight loss, +increased energy levels  ENT: -ear pain, -nasal congestion, -sore throat, +hoarseness  Cardiovascular: -chest pain, +palpitations, +lower extremity edema  Respiratory: +cough, -shortness of breath, +waking at night coughing       PHQ-4 Score: 0     From LOV w me 7/22/25  CHIEF COMPLAINT:  Ms. Damon presents  "today for follow up after stress test and reports persistent chest pain and fatigue since completing stress test. She has not yet heard back from Cardiology regarding results of the stress test. I suspect this is exacerbating her general anxiety.  CARDIAC:  She reports ongoing left-sided chest tightness and chest discomfort described as feeling like a "heartache" that limits her normal function. The discomfort is intermittent and not prolonged. She is currently taking imdur and also took one SL nitro this morning. Reviewed w Ms. Damon recent cardiology study reports. To my understanding, reports are overall quite positive/reassuring.   POST-STRESS TEST SYMPTOMS:  She developed a sore throat after spending approximately 6 hours at the medical facility for the stress test. She experienced a constant cough and felt unwell this past  weekend following the procedure. She has been using honey tea and lozenges for throat discomfort. She denies significant shortness of breath.  CURRENT SYMPTOMS:  She reports right lower back discomfort and significant fatigue, noting difficulty completing tasks. Her recent cough has improved. She had a headache a couple days ago that has resolved. She reports morning nausea and feeling tired easily.  FUNCTIONAL STATUS:  She is receiving home physical and occupational therapy. Aides assist with walking in morning and afternoon. She reports significant functional decline with increased fatigue during basic activities such as bathing. She has been unable to complete recent walking sessions and notes reduced mobility compared to prior status.  MEDICATIONS:  She takes Trazodone 100 mg (recently increased from 50 mg) and Temazepam for sleep. She drinks calming tea as a sleep aid. Aspirin is currently on hold in preparation for upcoming right eye surgery.  UPCOMING SURGERY:  She has scheduled bilateral cataract surgery, with initial procedure planned for the right eye which is more " significantly affected.  ROS:  General: -fever, -chills, +fatigue, -weight gain, -weight loss  ENT: -ear pain, +nasal congestion, +sore throat  Cardiovascular: +chest pain, -palpitations, -lower extremity edema, +chest tightness, +chest pain with exertion  Respiratory: +cough, -shortness of breath    Recent appointments:   8/05/25 [facility] LCSW  7/30/25 Pain Med Ignacio thoracic radiculopathy  7/29/25 Cards Delfino palpitations  7/23/25 Opthal Francisco J  7/23/25 Rheum Johnson    Upcoming appointments:  Future Appointments       Next 10 Appointments       Date Provider Specialty Appt Notes    8/7/2025 Huseyin Marx MD Ophthalmology 1 day post op    8/14/2025 Huseyin Marx MD Ophthalmology 1 week post op    8/14/2025  Chemotherapy Retacrit ow/cbd (after opth appt)    8/26/2025  Lab Talia / TY    9/3/2025 Gunner Melgar MD Nephrology EP/3 month f/u//HJB    9/4/2025 Mere Pineda FNP-C Primary Care LYFT / 1 mon f/u    9/9/2025 Génesis Gonzales LCSW Primary Care NEEDS LYFT - LCSW    9/11/2025 Yudith Mendoza NP Hematology and Oncology 6wk lab retacrit ((PER PT REQ))    9/11/2025  Chemotherapy Retacrit after NP ((PER PT REQ))    9/22/2025 Corin Lincoln NP Pain Medicine p inj f/u              Displaying the next 10 appointments. This patient has additional appointments scheduled.           The following were reviewed: Active problem list, medication list, allergies, family history, social history, and Health Maintenance.     Medications have been reviewed and reconciled with patient at visit today.    Exam: sat 97%  Vitals:    08/05/25 1453   BP: 138/70   Pulse: 63   Temp: 96.9 °F (36.1 °C)     Weight: 64.9 kg (143 lb 1.3 oz)   Body mass index is 27.94 kg/m².    BP Readings from Last 3 Encounters:   08/05/25 138/70   07/31/25 (!) 141/73   07/30/25 126/70      Wt Readings from Last 3 Encounters:   08/05/25 1453 64.9 kg (143 lb 1.3 oz)   07/30/25 1425 65.2 kg (143 lb 11.8 oz)    07/29/25 1407 64.5 kg (142 lb 3.2 oz)      Physical Exam  Vitals reviewed.   Constitutional:       General: She is not in acute distress.     Appearance: Normal appearance.   HENT:      Head: Normocephalic and atraumatic.      Right Ear: External ear normal.      Left Ear: External ear normal.      Nose: Nose normal.      Mouth/Throat:      Mouth: Mucous membranes are moist.      Pharynx: Oropharynx is clear.   Eyes:      Extraocular Movements: Extraocular movements intact.      Conjunctiva/sclera: Conjunctivae normal.      Comments: glasses   Cardiovascular:      Rate and Rhythm: Normal rate.   Pulmonary:      Effort: Pulmonary effort is normal. No respiratory distress.      Breath sounds: No wheezing.      Comments: Intermittent dry non-productive cough  Musculoskeletal:      Right lower leg: Edema present.      Left lower leg: Edema present.   Skin:     General: Skin is warm and dry.   Neurological:      Mental Status: She is alert and oriented to person, place, and time. Mental status is at baseline.      Gait: Gait abnormal.      Comments: Ambulates w cane for support  Hand tremor noted today   Psychiatric:         Behavior: Behavior normal.        Laboratory Reviewed  Lab Results   Component Value Date    WBC 3.61 (L) 07/23/2025    HGB 9.0 (L) 07/23/2025    HCT 29.0 (L) 07/23/2025     07/23/2025    MCV 93 07/23/2025    CHOL 196 04/25/2025    TRIG 192 (H) 04/25/2025    HDL 59 04/25/2025    LDLCALC 98.6 04/25/2025    ALT 27 07/23/2025    AST 43 07/23/2025     07/23/2025    K 4.6 07/23/2025     07/23/2025    CREATININE 3.1 (H) 07/23/2025    BUN 31 (H) 07/23/2025    CO2 25 07/23/2025    MG 2.1 07/03/2025    TSH 17.189 (H) 06/16/2025    FREET4 0.93 06/16/2025    PSA <0.1 05/27/2008    INR 0.9 05/07/2025    HGBA1C 5.1 07/02/2024    CRP 14.4 (H) 08/24/2023     Lab Results   Component Value Date    .3 (H) 03/25/2025    CALCIUM 9.0 07/23/2025    CAION 1.13 09/05/2018    PHOS 3.9 05/18/2025       Lab Results   Component Value Date    RTNMUFCZ34 1,254 (H) 05/17/2025     Lab Results   Component Value Date    FOLATE 16.7 05/17/2025      Lab Results   Component Value Date    IRON 74 06/16/2025    TRANSFERRIN 243 06/16/2025    TIBC 360 06/16/2025    LABIRON 21 06/16/2025    FESATURATED 20 02/24/2025      Lab Results   Component Value Date    EGFRNORACEVR 16 (L) 07/23/2025    ALBUMIN 3.5 07/23/2025     (H) 05/12/2025     Lab Results   Component Value Date    GLKOQYFS57JU 50 03/25/2025        Assessment:   70 y.o. female with multiple co-morbid illnesses here for continued follow up of medical problems.      The primary encounter diagnosis was Other hyperlipidemia. Diagnoses of Spinal stenosis of lumbosacral region, S/P orthotopic heart transplant, Palpitations, CKD (chronic kidney disease), stage IV, Immunocompromised state due to drug therapy, Acquired hypothyroidism, Age-related cataract of both eyes, unspecified age-related cataract type, and Chronic cough were also pertinent to this visit.      Plan:   1. Other hyperlipidemia  Overview:  goal LDL < 70 if tolerated    Orders:  -     Lipid Panel; Future; Expected date: 08/26/2025    2. Spinal stenosis of lumbosacral region  Overview:  - Imaging w/ severe degenerative disc disease with high-grade disc narrowing at T11 and T12, bulge at L4-5 with foraminal stenosis  - Outpatient follow-up with neurosurgery as recommended at [facility]      Assessment & Plan:  Followed by Pain Med - resumed tizanidine - scheduled for HELLEN 8/28      3. S/P orthotopic heart transplant  Overview:  5/93     Assessment & Plan:  Chronic immunosuppression w cyclosporine - maintains f/u w [facility] NO transplant      4. Palpitations  Assessment & Plan:  Recently completed cardiac monitoring - f/u on report once resulted - cont nifedipine - maintain electrolyte levels wnl      5. CKD (chronic kidney disease), stage IV  Assessment & Plan:  Borderline CKD stage 4/5 - now established w   [facility] nephrology - considering if wants to proceed w HD preparation      6. Immunocompromised state due to drug therapy  Assessment & Plan:  At increased risk for infection - will be monitored closely by Ophthalmology following cataract surgeries      7. Acquired hypothyroidism  Assessment & Plan:  F/u TSH to determine if to cont current Rx or increase      8. Age-related cataract of both eyes, unspecified age-related cataract type  Assessment & Plan:  Scheduled for B cataract surgery 8/07 & 8/14 - aspirin on hold      9. Chronic cough  Assessment & Plan:  Cont monitor - O2 sats good - no acute resp distress           Health Maintenance         Date Due Completion Date    RSV Vaccine (Age 60+ and Pregnant patients) (1 - Risk 60-74 years 1-dose series) Never done ---    Influenza Vaccine (1) 09/01/2025 10/16/2024    Colorectal Cancer Screening 02/25/2026 2/25/2021    Lipid Panel 04/25/2026 4/25/2025    Mammogram 06/10/2026 6/10/2025    DEXA Scan 07/03/2026 7/3/2024    TETANUS VACCINE 09/09/2030 9/9/2020            -Patient's lab results were reviewed and discussed with patient  -Treatment options and alternatives were discussed with the patient. Patient expressed understanding. Patient was given the opportunity to ask questions and be an active participant in their medical care. Patient had no further questions or concerns at this time.     Follow up: Follow up in about 1 month (around 9/5/2025) for Follow Up.    Care Plan/Goals: Reviewed No   Goals         limit pain - extend life - remain independent (pt-stated)       Achievable - within patient's control: Yes    Difficulties Identified: significant thoracic back pain - advanced CKD - s/p heart transplant    Plan for Overcoming difficulties: cont close f/u w specialists, healthy food and beverage choices, support of friends    Timeframe for completion: indefinite                    After visit summary printed and given to patient upon discharge.  Patient goals  and care plan are included in After visit summary.    TOTAL TIME evaluating and managing this patient for this encounter was 90 minutes. This time was spent personally by me on some of the following activities: review of patient's past medical history, assessing age-appropriate health maintenance needs, review of any interval history, review and interpretation of lab results, review and interpretation of imaging test results, review and interpretation of cardiology test results, reviewing consulting specialist notes, obtaining history from the patient and family, examination of the patient, medication reconciliation, managing and/or ordering prescription medications, ordering imaging tests, ordering referral to subspecialty provider(s), educating patient and answering their questions about diagnosis, treatment plan, and goals of treatment, discussing planned follow-up and final documentation of the visit. This time was exclusive of any separately billable procedures for this patient and exclusive of time spent treating any other patients.     This note was generated with the assistance of ambient listening technology. Verbal consent was obtained by the patient and accompanying visitor(s) for the recording of patient appointment to facilitate this note. I attest to having reviewed and edited the generated note for accuracy, though some syntax or spelling errors may persist. Please contact the author of this note for any clarification.

## 2025-08-05 NOTE — ASSESSMENT & PLAN NOTE
Borderline CKD stage 4/5 - now established w Dr. Melgar nephrology - considering if wants to proceed w HD preparation

## 2025-08-05 NOTE — PATIENT INSTRUCTIONS
If you are feeling unwell, we'd like to be the first ones to know here at [facility]! Please give us a call. Same day appointments are our top priority to keep you well and out of the emergency rooms and hospitals. Call [phone] for our direct line. After hours advice is always available. Please call [phone] after hours to speak to the on-call team.      Recommend RSV vaccine that can be scheduled at your pharmacy of choice.    Hydrate! Recommend put on compression socks/stockings first thing in the morning

## 2025-08-05 NOTE — ASSESSMENT & PLAN NOTE
Recently completed cardiac monitoring - f/u on report once resulted - cont nifedipine - maintain electrolyte levels wnl

## 2025-08-05 NOTE — ASSESSMENT & PLAN NOTE
At increased risk for infection - will be monitored closely by Ophthalmology following cataract surgeries

## 2025-08-06 DIAGNOSIS — E03.9 ACQUIRED HYPOTHYROIDISM: Primary | Chronic | ICD-10-CM

## 2025-08-06 RX ORDER — LEVOTHYROXINE SODIUM 50 UG/1
50 TABLET ORAL
Qty: 90 TABLET | Refills: 0 | Status: SHIPPED | OUTPATIENT
Start: 2025-08-06

## 2025-08-07 ENCOUNTER — OFFICE VISIT (OUTPATIENT)
Dept: OPHTHALMOLOGY | Facility: CLINIC | Age: 70
End: 2025-08-07
Payer: MEDICARE

## 2025-08-07 ENCOUNTER — TELEPHONE (OUTPATIENT)
Dept: PRIMARY CARE CLINIC | Facility: CLINIC | Age: 70
End: 2025-08-07
Payer: MEDICARE

## 2025-08-07 DIAGNOSIS — Z98.41 CATARACT EXTRACTION STATUS OF EYE, RIGHT: ICD-10-CM

## 2025-08-07 DIAGNOSIS — Z98.890 POST-OPERATIVE STATE: Primary | ICD-10-CM

## 2025-08-07 PROCEDURE — 99999 PR PBB SHADOW E&M-EST. PATIENT-LVL I: CPT | Mod: PBBFAC,HCNC,, | Performed by: OPHTHALMOLOGY

## 2025-08-07 NOTE — TELEPHONE ENCOUNTER
Informed pt of results, pt VU. No further questions.         ----- Message from Elis Wick MD sent at 8/6/2025  6:27 PM CDT -----  Pt does not use [facility] pt portal - please call w message below:    TSH level is better but still above 10. I think increasing to 50 mcg daily would be of benefit but we'll want to watch these levels closely. I will add order for repeat thyroid function in about 8   weeks again.  ----- Message -----  From: Lab, Background User  Sent: 8/5/2025   5:51 PM CDT  To: Elis Wick MD

## 2025-08-07 NOTE — PROGRESS NOTES
HPI    Doing well post phaco OD  Pain 0/10  Last edited by Huseyin Marx MD on 8/7/2025  8:04 AM.            Assessment /Plan     For exam results, see Encounter Report.      ICD-10-CM ICD-9-CM    1. Post-operative state  Z98.890 V45.89       2. Cataract extraction status of eye, right  Z98.41 V45.61           PO Day 1 S/P Phaco/IOL right eye  Doing well.    Use Prednisolone Acetate- 4xs daily      Reinstructed in importance of absolute compliance with Post-OP instructions including medications, shield at bedtime, and limitation of activities. Follow up appointments in approximately one and six weeks or call immediately for increased pain, redness or vision loss.

## 2025-08-08 ENCOUNTER — DOCUMENT SCAN (OUTPATIENT)
Dept: HOME HEALTH SERVICES | Facility: HOSPITAL | Age: 70
End: 2025-08-08
Payer: MEDICARE

## 2025-08-10 ENCOUNTER — HOSPITAL ENCOUNTER (INPATIENT)
Facility: HOSPITAL | Age: 70
LOS: 4 days | Discharge: HOME | DRG: 314 | End: 2025-08-15
Attending: EMERGENCY MEDICINE | Admitting: INTERNAL MEDICINE
Payer: MEDICARE

## 2025-08-10 ENCOUNTER — NURSE TRIAGE (OUTPATIENT)
Dept: ADMINISTRATIVE | Facility: CLINIC | Age: 70
End: 2025-08-10
Payer: MEDICARE

## 2025-08-10 DIAGNOSIS — R79.89 ELEVATED TROPONIN: Primary | ICD-10-CM

## 2025-08-10 DIAGNOSIS — I50.9 ACUTE EXACERBATION OF CHF (CONGESTIVE HEART FAILURE): ICD-10-CM

## 2025-08-10 DIAGNOSIS — M47.816 LUMBAR SPONDYLOSIS: ICD-10-CM

## 2025-08-10 DIAGNOSIS — I50.31 ACUTE DIASTOLIC CONGESTIVE HEART FAILURE: ICD-10-CM

## 2025-08-10 DIAGNOSIS — S20.219S CONTUSION OF RIB, UNSPECIFIED LATERALITY, SEQUELA: ICD-10-CM

## 2025-08-10 DIAGNOSIS — R07.9 CHEST PAIN: ICD-10-CM

## 2025-08-10 DIAGNOSIS — M47.814 THORACIC SPONDYLOSIS: ICD-10-CM

## 2025-08-10 LAB
ABSOLUTE EOSINOPHIL (OHS): 0.15 K/UL
ABSOLUTE MONOCYTE (OHS): 0.48 K/UL (ref 0.3–1)
ABSOLUTE NEUTROPHIL COUNT (OHS): 4.84 K/UL (ref 1.8–7.7)
ALBUMIN SERPL BCP-MCNC: 3.8 G/DL (ref 3.5–5.2)
ALP SERPL-CCNC: 191 UNIT/L (ref 40–150)
ALT SERPL W/O P-5'-P-CCNC: 33 UNIT/L (ref 10–44)
ANION GAP (OHS): 14 MMOL/L (ref 8–16)
AST SERPL-CCNC: 80 UNIT/L (ref 11–45)
BACTERIA #/AREA URNS AUTO: NORMAL /HPF
BASOPHILS # BLD AUTO: 0.01 K/UL
BASOPHILS NFR BLD AUTO: 0.2 %
BILIRUB SERPL-MCNC: 0.5 MG/DL (ref 0.1–1)
BILIRUB UR QL STRIP.AUTO: NEGATIVE
BUN SERPL-MCNC: 27 MG/DL (ref 8–23)
CALCIUM SERPL-MCNC: 9.6 MG/DL (ref 8.7–10.5)
CHLORIDE SERPL-SCNC: 102 MMOL/L (ref 95–110)
CLARITY UR: CLEAR
CO2 SERPL-SCNC: 24 MMOL/L (ref 23–29)
COLOR UR AUTO: YELLOW
CREAT SERPL-MCNC: 3.1 MG/DL (ref 0.5–1.4)
ERYTHROCYTE [DISTWIDTH] IN BLOOD BY AUTOMATED COUNT: 16.1 % (ref 11.5–14.5)
GFR SERPLBLD CREATININE-BSD FMLA CKD-EPI: 16 ML/MIN/1.73/M2
GLUCOSE SERPL-MCNC: 92 MG/DL (ref 70–110)
GLUCOSE UR QL STRIP: NEGATIVE
HCT VFR BLD AUTO: 28.5 % (ref 37–48.5)
HGB BLD-MCNC: 9.5 GM/DL (ref 12–16)
HGB UR QL STRIP: NEGATIVE
HOLD SPECIMEN: NORMAL
HOLD SPECIMEN: NORMAL
HYALINE CASTS UR QL AUTO: 0 /LPF (ref 0–1)
IMM GRANULOCYTES # BLD AUTO: 0.04 K/UL (ref 0–0.04)
IMM GRANULOCYTES NFR BLD AUTO: 0.6 % (ref 0–0.5)
KETONES UR QL STRIP: NEGATIVE
LEUKOCYTE ESTERASE UR QL STRIP: NEGATIVE
LIPASE SERPL-CCNC: 43 U/L (ref 4–60)
LYMPHOCYTES # BLD AUTO: 1.05 K/UL (ref 1–4.8)
MCH RBC QN AUTO: 29.7 PG (ref 27–31)
MCHC RBC AUTO-ENTMCNC: 33.3 G/DL (ref 32–36)
MCV RBC AUTO: 89 FL (ref 82–98)
MICROSCOPIC COMMENT: NORMAL
NITRITE UR QL STRIP: NEGATIVE
NT-PROBNP SERPL-MCNC: ABNORMAL PG/ML
NUCLEATED RBC (/100WBC) (OHS): 0 /100 WBC
OHS QRS DURATION: 158 MS
OHS QTC CALCULATION: 560 MS
PH UR STRIP: 8 [PH]
PLATELET # BLD AUTO: 161 K/UL (ref 150–450)
PMV BLD AUTO: 9.5 FL (ref 9.2–12.9)
POTASSIUM SERPL-SCNC: 4.1 MMOL/L (ref 3.5–5.1)
PROT SERPL-MCNC: 9 GM/DL (ref 6–8.4)
PROT UR QL STRIP: ABNORMAL
RBC # BLD AUTO: 3.2 M/UL (ref 4–5.4)
RBC #/AREA URNS AUTO: 0 /HPF (ref 0–4)
RELATIVE EOSINOPHIL (OHS): 2.3 %
RELATIVE LYMPHOCYTE (OHS): 16 % (ref 18–48)
RELATIVE MONOCYTE (OHS): 7.3 % (ref 4–15)
RELATIVE NEUTROPHIL (OHS): 73.6 % (ref 38–73)
SODIUM SERPL-SCNC: 140 MMOL/L (ref 136–145)
SP GR UR STRIP: 1.01
TROPONIN I SERPL HS-MCNC: 18 NG/L
UROBILINOGEN UR STRIP-ACNC: NEGATIVE EU/DL
WBC # BLD AUTO: 6.57 K/UL (ref 3.9–12.7)
WBC #/AREA URNS AUTO: 2 /HPF (ref 0–5)

## 2025-08-10 PROCEDURE — A9698 NON-RAD CONTRAST MATERIALNOC: HCPCS | Mod: HCNC | Performed by: EMERGENCY MEDICINE

## 2025-08-10 PROCEDURE — 81003 URINALYSIS AUTO W/O SCOPE: CPT | Mod: HCNC | Performed by: NURSE PRACTITIONER

## 2025-08-10 PROCEDURE — 83880 ASSAY OF NATRIURETIC PEPTIDE: CPT | Mod: HCNC | Performed by: NURSE PRACTITIONER

## 2025-08-10 PROCEDURE — 99291 CRITICAL CARE FIRST HOUR: CPT | Mod: HCNC

## 2025-08-10 PROCEDURE — 84484 ASSAY OF TROPONIN QUANT: CPT | Mod: HCNC | Performed by: NURSE PRACTITIONER

## 2025-08-10 PROCEDURE — 93005 ELECTROCARDIOGRAM TRACING: CPT | Mod: HCNC

## 2025-08-10 PROCEDURE — 63600175 PHARM REV CODE 636 W HCPCS: Mod: HCNC | Performed by: EMERGENCY MEDICINE

## 2025-08-10 PROCEDURE — 25500020 PHARM REV CODE 255: Mod: HCNC | Performed by: EMERGENCY MEDICINE

## 2025-08-10 PROCEDURE — 96374 THER/PROPH/DIAG INJ IV PUSH: CPT | Mod: HCNC

## 2025-08-10 PROCEDURE — 85025 COMPLETE CBC W/AUTO DIFF WBC: CPT | Mod: HCNC | Performed by: NURSE PRACTITIONER

## 2025-08-10 PROCEDURE — 93010 ELECTROCARDIOGRAM REPORT: CPT | Mod: HCNC,,, | Performed by: INTERNAL MEDICINE

## 2025-08-10 PROCEDURE — 83690 ASSAY OF LIPASE: CPT | Mod: HCNC | Performed by: EMERGENCY MEDICINE

## 2025-08-10 PROCEDURE — 80053 COMPREHEN METABOLIC PANEL: CPT | Mod: HCNC | Performed by: NURSE PRACTITIONER

## 2025-08-10 RX ORDER — MORPHINE SULFATE 4 MG/ML
4 INJECTION, SOLUTION INTRAMUSCULAR; INTRAVENOUS
Refills: 0 | Status: COMPLETED | OUTPATIENT
Start: 2025-08-10 | End: 2025-08-11

## 2025-08-10 RX ORDER — MORPHINE SULFATE 4 MG/ML
4 INJECTION, SOLUTION INTRAMUSCULAR; INTRAVENOUS
Refills: 0 | Status: COMPLETED | OUTPATIENT
Start: 2025-08-10 | End: 2025-08-10

## 2025-08-10 RX ORDER — FUROSEMIDE 10 MG/ML
40 INJECTION INTRAMUSCULAR; INTRAVENOUS
Status: COMPLETED | OUTPATIENT
Start: 2025-08-10 | End: 2025-08-11

## 2025-08-10 RX ORDER — MULTIVITAMIN
1 TABLET ORAL DAILY
COMMUNITY

## 2025-08-10 RX ADMIN — MORPHINE SULFATE 4 MG: 4 INJECTION INTRAVENOUS at 10:08

## 2025-08-10 RX ADMIN — IOHEXOL 1000 ML: 12 SOLUTION ORAL at 10:08

## 2025-08-11 ENCOUNTER — TELEPHONE (OUTPATIENT)
Dept: TRANSPLANT | Facility: CLINIC | Age: 70
End: 2025-08-11
Payer: MEDICARE

## 2025-08-11 ENCOUNTER — PATIENT MESSAGE (OUTPATIENT)
Dept: OPHTHALMOLOGY | Facility: CLINIC | Age: 70
End: 2025-08-11
Payer: MEDICARE

## 2025-08-11 ENCOUNTER — DOCUMENTATION ONLY (OUTPATIENT)
Dept: CARDIOLOGY | Facility: CLINIC | Age: 70
End: 2025-08-11
Payer: MEDICARE

## 2025-08-11 PROBLEM — I50.31 ACUTE DIASTOLIC CONGESTIVE HEART FAILURE: Status: ACTIVE | Noted: 2025-08-11

## 2025-08-11 PROBLEM — K43.9 HERNIA OF ABDOMINAL WALL: Status: ACTIVE | Noted: 2025-08-11

## 2025-08-11 LAB
ABSOLUTE EOSINOPHIL (OHS): 0.16 K/UL
ABSOLUTE MONOCYTE (OHS): 0.49 K/UL (ref 0.3–1)
ABSOLUTE NEUTROPHIL COUNT (OHS): 3.82 K/UL (ref 1.8–7.7)
ALBUMIN SERPL BCP-MCNC: 3.5 G/DL (ref 3.5–5.2)
ALP SERPL-CCNC: 170 UNIT/L (ref 40–150)
ALT SERPL W/O P-5'-P-CCNC: 31 UNIT/L (ref 10–44)
ANION GAP (OHS): 14 MMOL/L (ref 8–16)
AST SERPL-CCNC: 57 UNIT/L (ref 11–45)
BASOPHILS # BLD AUTO: 0.02 K/UL
BASOPHILS NFR BLD AUTO: 0.4 %
BILIRUB SERPL-MCNC: 0.5 MG/DL (ref 0.1–1)
BUN SERPL-MCNC: 28 MG/DL (ref 8–23)
CALCIUM SERPL-MCNC: 9.3 MG/DL (ref 8.7–10.5)
CHLORIDE SERPL-SCNC: 103 MMOL/L (ref 95–110)
CO2 SERPL-SCNC: 22 MMOL/L (ref 23–29)
CREAT SERPL-MCNC: 3.1 MG/DL (ref 0.5–1.4)
ERYTHROCYTE [DISTWIDTH] IN BLOOD BY AUTOMATED COUNT: 16 % (ref 11.5–14.5)
GFR SERPLBLD CREATININE-BSD FMLA CKD-EPI: 16 ML/MIN/1.73/M2
GLUCOSE SERPL-MCNC: 100 MG/DL (ref 70–110)
HCT VFR BLD AUTO: 28.2 % (ref 37–48.5)
HGB BLD-MCNC: 9.3 GM/DL (ref 12–16)
HOLD SPECIMEN: NORMAL
IMM GRANULOCYTES # BLD AUTO: 0.01 K/UL (ref 0–0.04)
IMM GRANULOCYTES NFR BLD AUTO: 0.2 % (ref 0–0.5)
LYMPHOCYTES # BLD AUTO: 0.97 K/UL (ref 1–4.8)
MCH RBC QN AUTO: 29.5 PG (ref 27–31)
MCHC RBC AUTO-ENTMCNC: 33 G/DL (ref 32–36)
MCV RBC AUTO: 90 FL (ref 82–98)
NUCLEATED RBC (/100WBC) (OHS): 0 /100 WBC
PLATELET # BLD AUTO: 153 K/UL (ref 150–450)
PMV BLD AUTO: 10 FL (ref 9.2–12.9)
POTASSIUM SERPL-SCNC: 3.6 MMOL/L (ref 3.5–5.1)
PROT SERPL-MCNC: 8 GM/DL (ref 6–8.4)
RBC # BLD AUTO: 3.15 M/UL (ref 4–5.4)
RELATIVE EOSINOPHIL (OHS): 2.9 %
RELATIVE LYMPHOCYTE (OHS): 17.7 % (ref 18–48)
RELATIVE MONOCYTE (OHS): 9 % (ref 4–15)
RELATIVE NEUTROPHIL (OHS): 69.8 % (ref 38–73)
SODIUM SERPL-SCNC: 139 MMOL/L (ref 136–145)
TROPONIN I SERPL HS-MCNC: 21 NG/L
TROPONIN I SERPL HS-MCNC: 22 NG/L
WBC # BLD AUTO: 5.47 K/UL (ref 3.9–12.7)

## 2025-08-11 PROCEDURE — 21400001 HC TELEMETRY ROOM: Mod: HCNC

## 2025-08-11 PROCEDURE — 96376 TX/PRO/DX INJ SAME DRUG ADON: CPT | Mod: HCNC

## 2025-08-11 PROCEDURE — 36415 COLL VENOUS BLD VENIPUNCTURE: CPT | Mod: HCNC | Performed by: NURSE PRACTITIONER

## 2025-08-11 PROCEDURE — 80053 COMPREHEN METABOLIC PANEL: CPT | Mod: HCNC | Performed by: NURSE PRACTITIONER

## 2025-08-11 PROCEDURE — 25000003 PHARM REV CODE 250: Mod: HCNC | Performed by: EMERGENCY MEDICINE

## 2025-08-11 PROCEDURE — 63600175 PHARM REV CODE 636 W HCPCS: Mod: HCNC | Performed by: NURSE PRACTITIONER

## 2025-08-11 PROCEDURE — 25000003 PHARM REV CODE 250: Mod: HCNC | Performed by: STUDENT IN AN ORGANIZED HEALTH CARE EDUCATION/TRAINING PROGRAM

## 2025-08-11 PROCEDURE — 96375 TX/PRO/DX INJ NEW DRUG ADDON: CPT | Mod: HCNC

## 2025-08-11 PROCEDURE — 84484 ASSAY OF TROPONIN QUANT: CPT | Mod: HCNC | Performed by: NURSE PRACTITIONER

## 2025-08-11 PROCEDURE — 85025 COMPLETE CBC W/AUTO DIFF WBC: CPT | Mod: HCNC | Performed by: NURSE PRACTITIONER

## 2025-08-11 PROCEDURE — 63600175 PHARM REV CODE 636 W HCPCS: Mod: HCNC | Performed by: EMERGENCY MEDICINE

## 2025-08-11 PROCEDURE — 27000221 HC OXYGEN, UP TO 24 HOURS: Mod: HCNC

## 2025-08-11 PROCEDURE — 99223 1ST HOSP IP/OBS HIGH 75: CPT | Mod: HCNC,,, | Performed by: INTERNAL MEDICINE

## 2025-08-11 PROCEDURE — 94761 N-INVAS EAR/PLS OXIMETRY MLT: CPT | Mod: HCNC

## 2025-08-11 PROCEDURE — 99223 1ST HOSP IP/OBS HIGH 75: CPT | Mod: HCNC,,, | Performed by: STUDENT IN AN ORGANIZED HEALTH CARE EDUCATION/TRAINING PROGRAM

## 2025-08-11 PROCEDURE — 25000003 PHARM REV CODE 250: Mod: HCNC | Performed by: NURSE PRACTITIONER

## 2025-08-11 RX ORDER — GLUCAGON 1 MG
1 KIT INJECTION
Status: DISCONTINUED | OUTPATIENT
Start: 2025-08-11 | End: 2025-08-15 | Stop reason: HOSPADM

## 2025-08-11 RX ORDER — ATORVASTATIN CALCIUM 10 MG/1
20 TABLET, FILM COATED ORAL DAILY
Status: DISCONTINUED | OUTPATIENT
Start: 2025-08-11 | End: 2025-08-15 | Stop reason: HOSPADM

## 2025-08-11 RX ORDER — SIMETHICONE 80 MG
1 TABLET,CHEWABLE ORAL 4 TIMES DAILY PRN
Status: DISCONTINUED | OUTPATIENT
Start: 2025-08-11 | End: 2025-08-15 | Stop reason: HOSPADM

## 2025-08-11 RX ORDER — IBUPROFEN 200 MG
16 TABLET ORAL
Status: DISCONTINUED | OUTPATIENT
Start: 2025-08-11 | End: 2025-08-15 | Stop reason: HOSPADM

## 2025-08-11 RX ORDER — FENTANYL CITRATE 50 UG/ML
100 INJECTION, SOLUTION INTRAMUSCULAR; INTRAVENOUS
Refills: 0 | Status: COMPLETED | OUTPATIENT
Start: 2025-08-11 | End: 2025-08-11

## 2025-08-11 RX ORDER — OXYCODONE AND ACETAMINOPHEN 10; 325 MG/1; MG/1
1 TABLET ORAL EVERY 6 HOURS PRN
Refills: 0 | Status: DISCONTINUED | OUTPATIENT
Start: 2025-08-11 | End: 2025-08-15 | Stop reason: HOSPADM

## 2025-08-11 RX ORDER — FUROSEMIDE 10 MG/ML
80 INJECTION INTRAMUSCULAR; INTRAVENOUS
Status: COMPLETED | OUTPATIENT
Start: 2025-08-11 | End: 2025-08-11

## 2025-08-11 RX ORDER — ALUMINUM HYDROXIDE, MAGNESIUM HYDROXIDE, AND SIMETHICONE 1200; 120; 1200 MG/30ML; MG/30ML; MG/30ML
30 SUSPENSION ORAL 4 TIMES DAILY PRN
Status: DISCONTINUED | OUTPATIENT
Start: 2025-08-11 | End: 2025-08-11

## 2025-08-11 RX ORDER — NIFEDIPINE 60 MG/1
60 TABLET, EXTENDED RELEASE ORAL 2 TIMES DAILY
Status: DISCONTINUED | OUTPATIENT
Start: 2025-08-11 | End: 2025-08-13

## 2025-08-11 RX ORDER — TIZANIDINE 4 MG/1
4 TABLET ORAL 2 TIMES DAILY PRN
Status: DISCONTINUED | OUTPATIENT
Start: 2025-08-11 | End: 2025-08-15 | Stop reason: HOSPADM

## 2025-08-11 RX ORDER — LEVOTHYROXINE SODIUM 50 UG/1
50 TABLET ORAL
Status: DISCONTINUED | OUTPATIENT
Start: 2025-08-11 | End: 2025-08-15 | Stop reason: HOSPADM

## 2025-08-11 RX ORDER — PREDNISOLONE ACETATE 10 MG/ML
1 SUSPENSION/ DROPS OPHTHALMIC 4 TIMES DAILY
Status: DISCONTINUED | OUTPATIENT
Start: 2025-08-11 | End: 2025-08-15 | Stop reason: HOSPADM

## 2025-08-11 RX ORDER — SODIUM BICARBONATE 650 MG/1
650 TABLET ORAL 2 TIMES DAILY
Status: DISCONTINUED | OUTPATIENT
Start: 2025-08-11 | End: 2025-08-15 | Stop reason: HOSPADM

## 2025-08-11 RX ORDER — ONDANSETRON HYDROCHLORIDE 2 MG/ML
4 INJECTION, SOLUTION INTRAVENOUS EVERY 8 HOURS PRN
Status: DISCONTINUED | OUTPATIENT
Start: 2025-08-11 | End: 2025-08-15 | Stop reason: HOSPADM

## 2025-08-11 RX ORDER — PANTOPRAZOLE SODIUM 40 MG/1
40 TABLET, DELAYED RELEASE ORAL DAILY
Status: DISCONTINUED | OUTPATIENT
Start: 2025-08-11 | End: 2025-08-15 | Stop reason: HOSPADM

## 2025-08-11 RX ORDER — NALOXONE HCL 0.4 MG/ML
0.02 VIAL (ML) INJECTION
Status: DISCONTINUED | OUTPATIENT
Start: 2025-08-11 | End: 2025-08-15 | Stop reason: HOSPADM

## 2025-08-11 RX ORDER — ISOSORBIDE MONONITRATE 60 MG/1
60 TABLET, EXTENDED RELEASE ORAL DAILY
Status: DISCONTINUED | OUTPATIENT
Start: 2025-08-11 | End: 2025-08-15 | Stop reason: HOSPADM

## 2025-08-11 RX ORDER — ENOXAPARIN SODIUM 100 MG/ML
30 INJECTION SUBCUTANEOUS EVERY 24 HOURS
Status: DISCONTINUED | OUTPATIENT
Start: 2025-08-11 | End: 2025-08-15 | Stop reason: HOSPADM

## 2025-08-11 RX ORDER — CARVEDILOL 3.12 MG/1
3.12 TABLET ORAL 2 TIMES DAILY WITH MEALS
Status: DISCONTINUED | OUTPATIENT
Start: 2025-08-11 | End: 2025-08-13

## 2025-08-11 RX ORDER — ACETAMINOPHEN 325 MG/1
650 TABLET ORAL EVERY 6 HOURS PRN
Status: DISCONTINUED | OUTPATIENT
Start: 2025-08-11 | End: 2025-08-15 | Stop reason: HOSPADM

## 2025-08-11 RX ORDER — TALC
6 POWDER (GRAM) TOPICAL NIGHTLY PRN
Status: DISCONTINUED | OUTPATIENT
Start: 2025-08-11 | End: 2025-08-15 | Stop reason: HOSPADM

## 2025-08-11 RX ORDER — SODIUM CHLORIDE 0.9 % (FLUSH) 0.9 %
3 SYRINGE (ML) INJECTION EVERY 12 HOURS PRN
Status: DISCONTINUED | OUTPATIENT
Start: 2025-08-11 | End: 2025-08-15 | Stop reason: HOSPADM

## 2025-08-11 RX ORDER — POLYETHYLENE GLYCOL 3350 17 G/17G
17 POWDER, FOR SOLUTION ORAL DAILY PRN
Status: DISCONTINUED | OUTPATIENT
Start: 2025-08-11 | End: 2025-08-15 | Stop reason: HOSPADM

## 2025-08-11 RX ORDER — NITROGLYCERIN 0.4 MG/1
0.4 TABLET SUBLINGUAL EVERY 5 MIN PRN
Status: DISCONTINUED | OUTPATIENT
Start: 2025-08-11 | End: 2025-08-15 | Stop reason: HOSPADM

## 2025-08-11 RX ORDER — SERTRALINE HYDROCHLORIDE 50 MG/1
50 TABLET, FILM COATED ORAL DAILY
Status: DISCONTINUED | OUTPATIENT
Start: 2025-08-11 | End: 2025-08-15 | Stop reason: HOSPADM

## 2025-08-11 RX ORDER — IPRATROPIUM BROMIDE AND ALBUTEROL SULFATE 2.5; .5 MG/3ML; MG/3ML
3 SOLUTION RESPIRATORY (INHALATION) EVERY 4 HOURS PRN
Status: DISCONTINUED | OUTPATIENT
Start: 2025-08-11 | End: 2025-08-15 | Stop reason: HOSPADM

## 2025-08-11 RX ORDER — IBUPROFEN 200 MG
24 TABLET ORAL
Status: DISCONTINUED | OUTPATIENT
Start: 2025-08-11 | End: 2025-08-15 | Stop reason: HOSPADM

## 2025-08-11 RX ADMIN — ENOXAPARIN SODIUM 30 MG: 30 INJECTION SUBCUTANEOUS at 05:08

## 2025-08-11 RX ADMIN — HYPROMELLOSE 2910 1 DROP: 5 SOLUTION/ DROPS OPHTHALMIC at 11:08

## 2025-08-11 RX ADMIN — NIFEDIPINE 60 MG: 60 TABLET, FILM COATED, EXTENDED RELEASE ORAL at 09:08

## 2025-08-11 RX ADMIN — MORPHINE SULFATE 4 MG: 4 INJECTION INTRAVENOUS at 12:08

## 2025-08-11 RX ADMIN — PANTOPRAZOLE SODIUM 40 MG: 40 TABLET, DELAYED RELEASE ORAL at 09:08

## 2025-08-11 RX ADMIN — ATORVASTATIN CALCIUM 20 MG: 10 TABLET, FILM COATED ORAL at 09:08

## 2025-08-11 RX ADMIN — NIFEDIPINE 60 MG: 60 TABLET, FILM COATED, EXTENDED RELEASE ORAL at 08:08

## 2025-08-11 RX ADMIN — LEVOTHYROXINE SODIUM 50 MCG: 0.05 TABLET ORAL at 05:08

## 2025-08-11 RX ADMIN — TIZANIDINE 4 MG: 4 TABLET ORAL at 05:08

## 2025-08-11 RX ADMIN — FENTANYL CITRATE 100 MCG: 50 INJECTION INTRAMUSCULAR; INTRAVENOUS at 12:08

## 2025-08-11 RX ADMIN — SODIUM BICARBONATE 650 MG TABLET 650 MG: at 08:08

## 2025-08-11 RX ADMIN — SERTRALINE HYDROCHLORIDE 50 MG: 50 TABLET ORAL at 09:08

## 2025-08-11 RX ADMIN — PREDNISOLONE ACETATE 1 DROP: 10 SUSPENSION/ DROPS OPHTHALMIC at 05:08

## 2025-08-11 RX ADMIN — SODIUM BICARBONATE 650 MG TABLET 650 MG: at 09:08

## 2025-08-11 RX ADMIN — HYPROMELLOSE 2910 1 DROP: 5 SOLUTION/ DROPS OPHTHALMIC at 08:08

## 2025-08-11 RX ADMIN — FUROSEMIDE 80 MG: 10 INJECTION, SOLUTION INTRAMUSCULAR; INTRAVENOUS at 02:08

## 2025-08-11 RX ADMIN — PREDNISOLONE ACETATE 1 DROP: 10 SUSPENSION/ DROPS OPHTHALMIC at 11:08

## 2025-08-11 RX ADMIN — CARVEDILOL 3.12 MG: 3.12 TABLET, FILM COATED ORAL at 05:08

## 2025-08-11 RX ADMIN — OXYCODONE AND ACETAMINOPHEN 1 TABLET: 10; 325 TABLET ORAL at 11:08

## 2025-08-11 RX ADMIN — ACETAMINOPHEN 650 MG: 325 TABLET ORAL at 10:08

## 2025-08-11 RX ADMIN — FUROSEMIDE 10 MG/HR: 10 INJECTION, SOLUTION INTRAMUSCULAR; INTRAVENOUS at 03:08

## 2025-08-11 RX ADMIN — PREDNISOLONE ACETATE 1 DROP: 10 SUSPENSION/ DROPS OPHTHALMIC at 08:08

## 2025-08-11 RX ADMIN — HYPROMELLOSE 2910 1 DROP: 5 SOLUTION/ DROPS OPHTHALMIC at 02:08

## 2025-08-11 RX ADMIN — ISOSORBIDE MONONITRATE 60 MG: 60 TABLET, EXTENDED RELEASE ORAL at 09:08

## 2025-08-11 RX ADMIN — FUROSEMIDE 10 MG/HR: 10 INJECTION, SOLUTION INTRAMUSCULAR; INTRAVENOUS at 08:08

## 2025-08-11 RX ADMIN — TIZANIDINE 4 MG: 4 TABLET ORAL at 02:08

## 2025-08-11 RX ADMIN — CARVEDILOL 3.12 MG: 3.12 TABLET, FILM COATED ORAL at 09:08

## 2025-08-11 RX ADMIN — Medication 6 MG: at 10:08

## 2025-08-11 RX ADMIN — PREDNISOLONE ACETATE 1 DROP: 10 SUSPENSION/ DROPS OPHTHALMIC at 01:08

## 2025-08-11 RX ADMIN — FUROSEMIDE 40 MG: 10 INJECTION, SOLUTION INTRAMUSCULAR; INTRAVENOUS at 12:08

## 2025-08-12 LAB
ALBUMIN SERPL BCP-MCNC: 3.2 G/DL (ref 3.5–5.2)
ALP SERPL-CCNC: 143 UNIT/L (ref 40–150)
ALT SERPL W/O P-5'-P-CCNC: 25 UNIT/L (ref 10–44)
ANION GAP (OHS): 12 MMOL/L (ref 8–16)
AST SERPL-CCNC: 43 UNIT/L (ref 11–45)
BILIRUB SERPL-MCNC: 0.5 MG/DL (ref 0.1–1)
BUN SERPL-MCNC: 34 MG/DL (ref 8–23)
CALCIUM SERPL-MCNC: 8.4 MG/DL (ref 8.7–10.5)
CHLORIDE SERPL-SCNC: 102 MMOL/L (ref 95–110)
CO2 SERPL-SCNC: 25 MMOL/L (ref 23–29)
CREAT SERPL-MCNC: 3.5 MG/DL (ref 0.5–1.4)
GFR SERPLBLD CREATININE-BSD FMLA CKD-EPI: 13 ML/MIN/1.73/M2
GLUCOSE SERPL-MCNC: 77 MG/DL (ref 70–110)
POTASSIUM SERPL-SCNC: 4.1 MMOL/L (ref 3.5–5.1)
PROT SERPL-MCNC: 7.2 GM/DL (ref 6–8.4)
SODIUM SERPL-SCNC: 139 MMOL/L (ref 136–145)

## 2025-08-12 PROCEDURE — 99233 SBSQ HOSP IP/OBS HIGH 50: CPT | Mod: HCNC,,, | Performed by: INTERNAL MEDICINE

## 2025-08-12 PROCEDURE — 36415 COLL VENOUS BLD VENIPUNCTURE: CPT | Mod: HCNC | Performed by: STUDENT IN AN ORGANIZED HEALTH CARE EDUCATION/TRAINING PROGRAM

## 2025-08-12 PROCEDURE — 25000003 PHARM REV CODE 250: Mod: HCNC | Performed by: NURSE PRACTITIONER

## 2025-08-12 PROCEDURE — 82310 ASSAY OF CALCIUM: CPT | Mod: HCNC | Performed by: STUDENT IN AN ORGANIZED HEALTH CARE EDUCATION/TRAINING PROGRAM

## 2025-08-12 PROCEDURE — A9698 NON-RAD CONTRAST MATERIALNOC: HCPCS | Mod: HCNC | Performed by: STUDENT IN AN ORGANIZED HEALTH CARE EDUCATION/TRAINING PROGRAM

## 2025-08-12 PROCEDURE — 94761 N-INVAS EAR/PLS OXIMETRY MLT: CPT | Mod: HCNC

## 2025-08-12 PROCEDURE — 63600175 PHARM REV CODE 636 W HCPCS: Mod: HCNC | Performed by: NURSE PRACTITIONER

## 2025-08-12 PROCEDURE — 25000003 PHARM REV CODE 250: Mod: HCNC | Performed by: STUDENT IN AN ORGANIZED HEALTH CARE EDUCATION/TRAINING PROGRAM

## 2025-08-12 PROCEDURE — 63600175 PHARM REV CODE 636 W HCPCS: Mod: HCNC | Performed by: STUDENT IN AN ORGANIZED HEALTH CARE EDUCATION/TRAINING PROGRAM

## 2025-08-12 PROCEDURE — 25500020 PHARM REV CODE 255: Mod: HCNC | Performed by: STUDENT IN AN ORGANIZED HEALTH CARE EDUCATION/TRAINING PROGRAM

## 2025-08-12 PROCEDURE — 21400001 HC TELEMETRY ROOM: Mod: HCNC

## 2025-08-12 RX ORDER — AMOXICILLIN 250 MG
1 CAPSULE ORAL 2 TIMES DAILY
Status: DISCONTINUED | OUTPATIENT
Start: 2025-08-12 | End: 2025-08-15 | Stop reason: HOSPADM

## 2025-08-12 RX ORDER — CYCLOSPORINE 25 MG/1
50 CAPSULE, LIQUID FILLED ORAL 2 TIMES DAILY
Status: DISCONTINUED | OUTPATIENT
Start: 2025-08-12 | End: 2025-08-15 | Stop reason: HOSPADM

## 2025-08-12 RX ADMIN — ONDANSETRON 4 MG: 2 INJECTION INTRAMUSCULAR; INTRAVENOUS at 05:08

## 2025-08-12 RX ADMIN — OXYCODONE AND ACETAMINOPHEN 1 TABLET: 10; 325 TABLET ORAL at 05:08

## 2025-08-12 RX ADMIN — PREDNISOLONE ACETATE 1 DROP: 10 SUSPENSION/ DROPS OPHTHALMIC at 08:08

## 2025-08-12 RX ADMIN — ISOSORBIDE MONONITRATE 60 MG: 60 TABLET, EXTENDED RELEASE ORAL at 09:08

## 2025-08-12 RX ADMIN — ATORVASTATIN CALCIUM 20 MG: 10 TABLET, FILM COATED ORAL at 09:08

## 2025-08-12 RX ADMIN — TIZANIDINE 4 MG: 4 TABLET ORAL at 08:08

## 2025-08-12 RX ADMIN — POLYETHYLENE GLYCOL 3350 17 G: 17 POWDER, FOR SOLUTION ORAL at 11:08

## 2025-08-12 RX ADMIN — SERTRALINE HYDROCHLORIDE 50 MG: 50 TABLET ORAL at 09:08

## 2025-08-12 RX ADMIN — HYPROMELLOSE 2910 1 DROP: 5 SOLUTION/ DROPS OPHTHALMIC at 01:08

## 2025-08-12 RX ADMIN — PREDNISOLONE ACETATE 1 DROP: 10 SUSPENSION/ DROPS OPHTHALMIC at 01:08

## 2025-08-12 RX ADMIN — CARVEDILOL 3.12 MG: 3.12 TABLET, FILM COATED ORAL at 05:08

## 2025-08-12 RX ADMIN — SODIUM BICARBONATE 650 MG TABLET 650 MG: at 09:08

## 2025-08-12 RX ADMIN — HYPROMELLOSE 2910 1 DROP: 5 SOLUTION/ DROPS OPHTHALMIC at 09:08

## 2025-08-12 RX ADMIN — TIZANIDINE 4 MG: 4 TABLET ORAL at 11:08

## 2025-08-12 RX ADMIN — ACETAMINOPHEN 650 MG: 325 TABLET ORAL at 08:08

## 2025-08-12 RX ADMIN — ENOXAPARIN SODIUM 30 MG: 30 INJECTION SUBCUTANEOUS at 04:08

## 2025-08-12 RX ADMIN — NIFEDIPINE 60 MG: 60 TABLET, FILM COATED, EXTENDED RELEASE ORAL at 08:08

## 2025-08-12 RX ADMIN — CYCLOSPORINE 50 MG: 25 CAPSULE, LIQUID FILLED ORAL at 05:08

## 2025-08-12 RX ADMIN — FUROSEMIDE 5 MG/HR: 10 INJECTION, SOLUTION INTRAMUSCULAR; INTRAVENOUS at 09:08

## 2025-08-12 RX ADMIN — Medication 6 MG: at 08:08

## 2025-08-12 RX ADMIN — OXYCODONE AND ACETAMINOPHEN 1 TABLET: 10; 325 TABLET ORAL at 09:08

## 2025-08-12 RX ADMIN — SENNOSIDES AND DOCUSATE SODIUM 1 TABLET: 50; 8.6 TABLET ORAL at 08:08

## 2025-08-12 RX ADMIN — IOHEXOL 1000 ML: 9 SOLUTION ORAL at 05:08

## 2025-08-12 RX ADMIN — SIMETHICONE 80 MG: 80 TABLET, CHEWABLE ORAL at 08:08

## 2025-08-12 RX ADMIN — LEVOTHYROXINE SODIUM 50 MCG: 0.05 TABLET ORAL at 06:08

## 2025-08-12 RX ADMIN — NIFEDIPINE 60 MG: 60 TABLET, FILM COATED, EXTENDED RELEASE ORAL at 09:08

## 2025-08-12 RX ADMIN — SODIUM BICARBONATE 650 MG TABLET 650 MG: at 08:08

## 2025-08-12 RX ADMIN — CARVEDILOL 3.12 MG: 3.12 TABLET, FILM COATED ORAL at 09:08

## 2025-08-12 RX ADMIN — PREDNISOLONE ACETATE 1 DROP: 10 SUSPENSION/ DROPS OPHTHALMIC at 05:08

## 2025-08-12 RX ADMIN — PANTOPRAZOLE SODIUM 40 MG: 40 TABLET, DELAYED RELEASE ORAL at 09:08

## 2025-08-12 RX ADMIN — ONDANSETRON 4 MG: 2 INJECTION INTRAMUSCULAR; INTRAVENOUS at 01:08

## 2025-08-13 LAB
ALBUMIN SERPL BCP-MCNC: 3.1 G/DL (ref 3.5–5.2)
ALP SERPL-CCNC: 123 UNIT/L (ref 40–150)
ALT SERPL W/O P-5'-P-CCNC: 20 UNIT/L (ref 10–44)
ANION GAP (OHS): 13 MMOL/L (ref 8–16)
AST SERPL-CCNC: 39 UNIT/L (ref 11–45)
BILIRUB SERPL-MCNC: 0.3 MG/DL (ref 0.1–1)
BUN SERPL-MCNC: 38 MG/DL (ref 8–23)
CALCIUM SERPL-MCNC: 8.2 MG/DL (ref 8.7–10.5)
CHLORIDE SERPL-SCNC: 100 MMOL/L (ref 95–110)
CO2 SERPL-SCNC: 24 MMOL/L (ref 23–29)
CREAT SERPL-MCNC: 3.8 MG/DL (ref 0.5–1.4)
GFR SERPLBLD CREATININE-BSD FMLA CKD-EPI: 12 ML/MIN/1.73/M2
GLUCOSE SERPL-MCNC: 108 MG/DL (ref 70–110)
POTASSIUM SERPL-SCNC: 3.9 MMOL/L (ref 3.5–5.1)
PROT SERPL-MCNC: 7 GM/DL (ref 6–8.4)
SODIUM SERPL-SCNC: 137 MMOL/L (ref 136–145)

## 2025-08-13 PROCEDURE — 36415 COLL VENOUS BLD VENIPUNCTURE: CPT | Mod: HCNC | Performed by: STUDENT IN AN ORGANIZED HEALTH CARE EDUCATION/TRAINING PROGRAM

## 2025-08-13 PROCEDURE — 63600175 PHARM REV CODE 636 W HCPCS: Mod: HCNC | Performed by: NURSE PRACTITIONER

## 2025-08-13 PROCEDURE — 25000003 PHARM REV CODE 250: Mod: HCNC | Performed by: STUDENT IN AN ORGANIZED HEALTH CARE EDUCATION/TRAINING PROGRAM

## 2025-08-13 PROCEDURE — 97163 PT EVAL HIGH COMPLEX 45 MIN: CPT | Mod: HCNC

## 2025-08-13 PROCEDURE — 84520 ASSAY OF UREA NITROGEN: CPT | Mod: HCNC | Performed by: STUDENT IN AN ORGANIZED HEALTH CARE EDUCATION/TRAINING PROGRAM

## 2025-08-13 PROCEDURE — 25000003 PHARM REV CODE 250: Mod: HCNC | Performed by: NURSE PRACTITIONER

## 2025-08-13 PROCEDURE — 97116 GAIT TRAINING THERAPY: CPT | Mod: HCNC

## 2025-08-13 PROCEDURE — 21400001 HC TELEMETRY ROOM: Mod: HCNC

## 2025-08-13 PROCEDURE — 63600175 PHARM REV CODE 636 W HCPCS: Mod: HCNC

## 2025-08-13 PROCEDURE — 99232 SBSQ HOSP IP/OBS MODERATE 35: CPT | Mod: HCNC,,, | Performed by: INTERNAL MEDICINE

## 2025-08-13 PROCEDURE — 63600175 PHARM REV CODE 636 W HCPCS: Mod: HCNC | Performed by: STUDENT IN AN ORGANIZED HEALTH CARE EDUCATION/TRAINING PROGRAM

## 2025-08-13 RX ORDER — FUROSEMIDE 10 MG/ML
40 INJECTION INTRAMUSCULAR; INTRAVENOUS DAILY
Status: DISCONTINUED | OUTPATIENT
Start: 2025-08-13 | End: 2025-08-14

## 2025-08-13 RX ORDER — NIFEDIPINE 60 MG/1
60 TABLET, EXTENDED RELEASE ORAL 2 TIMES DAILY
Status: DISCONTINUED | OUTPATIENT
Start: 2025-08-14 | End: 2025-08-15 | Stop reason: HOSPADM

## 2025-08-13 RX ORDER — CARVEDILOL 3.12 MG/1
3.12 TABLET ORAL 2 TIMES DAILY WITH MEALS
Status: DISCONTINUED | OUTPATIENT
Start: 2025-08-14 | End: 2025-08-15 | Stop reason: HOSPADM

## 2025-08-13 RX ORDER — SODIUM CHLORIDE 9 MG/ML
INJECTION, SOLUTION INTRAVENOUS CONTINUOUS
Status: ACTIVE | OUTPATIENT
Start: 2025-08-13 | End: 2025-08-14

## 2025-08-13 RX ORDER — SYRING-NEEDL,DISP,INSUL,0.3 ML 29 G X1/2"
148 SYRINGE, EMPTY DISPOSABLE MISCELLANEOUS ONCE
Status: COMPLETED | OUTPATIENT
Start: 2025-08-13 | End: 2025-08-13

## 2025-08-13 RX ADMIN — PREDNISOLONE ACETATE 1 DROP: 10 SUSPENSION/ DROPS OPHTHALMIC at 08:08

## 2025-08-13 RX ADMIN — TIZANIDINE 4 MG: 4 TABLET ORAL at 05:08

## 2025-08-13 RX ADMIN — OXYCODONE AND ACETAMINOPHEN 1 TABLET: 10; 325 TABLET ORAL at 06:08

## 2025-08-13 RX ADMIN — SODIUM BICARBONATE 650 MG TABLET 650 MG: at 08:08

## 2025-08-13 RX ADMIN — ISOSORBIDE MONONITRATE 60 MG: 60 TABLET, EXTENDED RELEASE ORAL at 08:08

## 2025-08-13 RX ADMIN — CARVEDILOL 3.12 MG: 3.12 TABLET, FILM COATED ORAL at 08:08

## 2025-08-13 RX ADMIN — HYPROMELLOSE 2910 1 DROP: 5 SOLUTION/ DROPS OPHTHALMIC at 08:08

## 2025-08-13 RX ADMIN — PREDNISOLONE ACETATE 1 DROP: 10 SUSPENSION/ DROPS OPHTHALMIC at 12:08

## 2025-08-13 RX ADMIN — ONDANSETRON 4 MG: 2 INJECTION INTRAMUSCULAR; INTRAVENOUS at 08:08

## 2025-08-13 RX ADMIN — PANTOPRAZOLE SODIUM 40 MG: 40 TABLET, DELAYED RELEASE ORAL at 08:08

## 2025-08-13 RX ADMIN — MAGNESIUM CITRATE 148 ML: 1.75 LIQUID ORAL at 08:08

## 2025-08-13 RX ADMIN — HYPROMELLOSE 2910 1 DROP: 5 SOLUTION/ DROPS OPHTHALMIC at 06:08

## 2025-08-13 RX ADMIN — TIZANIDINE 4 MG: 4 TABLET ORAL at 08:08

## 2025-08-13 RX ADMIN — NIFEDIPINE 60 MG: 60 TABLET, FILM COATED, EXTENDED RELEASE ORAL at 08:08

## 2025-08-13 RX ADMIN — CYCLOSPORINE 50 MG: 25 CAPSULE, LIQUID FILLED ORAL at 08:08

## 2025-08-13 RX ADMIN — ENOXAPARIN SODIUM 30 MG: 30 INJECTION SUBCUTANEOUS at 06:08

## 2025-08-13 RX ADMIN — PREDNISOLONE ACETATE 1 DROP: 10 SUSPENSION/ DROPS OPHTHALMIC at 06:08

## 2025-08-13 RX ADMIN — ATORVASTATIN CALCIUM 20 MG: 10 TABLET, FILM COATED ORAL at 08:08

## 2025-08-13 RX ADMIN — OXYCODONE AND ACETAMINOPHEN 1 TABLET: 10; 325 TABLET ORAL at 08:08

## 2025-08-13 RX ADMIN — LEVOTHYROXINE SODIUM 50 MCG: 0.05 TABLET ORAL at 05:08

## 2025-08-13 RX ADMIN — OXYCODONE AND ACETAMINOPHEN 1 TABLET: 10; 325 TABLET ORAL at 11:08

## 2025-08-13 RX ADMIN — SERTRALINE HYDROCHLORIDE 50 MG: 50 TABLET ORAL at 08:08

## 2025-08-13 RX ADMIN — HYPROMELLOSE 2910 1 DROP: 5 SOLUTION/ DROPS OPHTHALMIC at 12:08

## 2025-08-13 RX ADMIN — CYCLOSPORINE 50 MG: 25 CAPSULE, LIQUID FILLED ORAL at 06:08

## 2025-08-13 RX ADMIN — SENNOSIDES AND DOCUSATE SODIUM 1 TABLET: 50; 8.6 TABLET ORAL at 08:08

## 2025-08-13 RX ADMIN — SODIUM CHLORIDE: 9 INJECTION, SOLUTION INTRAVENOUS at 09:08

## 2025-08-13 RX ADMIN — FUROSEMIDE 40 MG: 10 INJECTION, SOLUTION INTRAMUSCULAR; INTRAVENOUS at 11:08

## 2025-08-14 LAB
ALBUMIN SERPL BCP-MCNC: 3.3 G/DL (ref 3.5–5.2)
ALP SERPL-CCNC: 139 UNIT/L (ref 40–150)
ALT SERPL W/O P-5'-P-CCNC: 20 UNIT/L (ref 10–44)
ANION GAP (OHS): 12 MMOL/L (ref 8–16)
AST SERPL-CCNC: 40 UNIT/L (ref 11–45)
BILIRUB SERPL-MCNC: 0.3 MG/DL (ref 0.1–1)
BUN SERPL-MCNC: 37 MG/DL (ref 8–23)
CALCIUM SERPL-MCNC: 8.2 MG/DL (ref 8.7–10.5)
CHLORIDE SERPL-SCNC: 100 MMOL/L (ref 95–110)
CO2 SERPL-SCNC: 27 MMOL/L (ref 23–29)
CREAT SERPL-MCNC: 4.2 MG/DL (ref 0.5–1.4)
GFR SERPLBLD CREATININE-BSD FMLA CKD-EPI: 11 ML/MIN/1.73/M2
GLUCOSE SERPL-MCNC: 95 MG/DL (ref 70–110)
POTASSIUM SERPL-SCNC: 4.4 MMOL/L (ref 3.5–5.1)
PROT SERPL-MCNC: 7.5 GM/DL (ref 6–8.4)
SODIUM SERPL-SCNC: 139 MMOL/L (ref 136–145)

## 2025-08-14 PROCEDURE — 97530 THERAPEUTIC ACTIVITIES: CPT | Mod: HCNC,CQ

## 2025-08-14 PROCEDURE — 25000003 PHARM REV CODE 250: Mod: HCNC | Performed by: INTERNAL MEDICINE

## 2025-08-14 PROCEDURE — 63600175 PHARM REV CODE 636 W HCPCS: Mod: HCNC | Performed by: NURSE PRACTITIONER

## 2025-08-14 PROCEDURE — 99223 1ST HOSP IP/OBS HIGH 75: CPT | Mod: HCNC,,, | Performed by: OPTOMETRIST

## 2025-08-14 PROCEDURE — 63600175 PHARM REV CODE 636 W HCPCS: Mod: HCNC | Performed by: STUDENT IN AN ORGANIZED HEALTH CARE EDUCATION/TRAINING PROGRAM

## 2025-08-14 PROCEDURE — 97116 GAIT TRAINING THERAPY: CPT | Mod: HCNC,CQ

## 2025-08-14 PROCEDURE — 36415 COLL VENOUS BLD VENIPUNCTURE: CPT | Mod: HCNC | Performed by: STUDENT IN AN ORGANIZED HEALTH CARE EDUCATION/TRAINING PROGRAM

## 2025-08-14 PROCEDURE — 25000003 PHARM REV CODE 250: Mod: HCNC | Performed by: NURSE PRACTITIONER

## 2025-08-14 PROCEDURE — 99233 SBSQ HOSP IP/OBS HIGH 50: CPT | Mod: HCNC,,, | Performed by: STUDENT IN AN ORGANIZED HEALTH CARE EDUCATION/TRAINING PROGRAM

## 2025-08-14 PROCEDURE — 84295 ASSAY OF SERUM SODIUM: CPT | Mod: HCNC | Performed by: STUDENT IN AN ORGANIZED HEALTH CARE EDUCATION/TRAINING PROGRAM

## 2025-08-14 PROCEDURE — 21400001 HC TELEMETRY ROOM: Mod: HCNC

## 2025-08-14 PROCEDURE — 25000003 PHARM REV CODE 250: Mod: HCNC | Performed by: STUDENT IN AN ORGANIZED HEALTH CARE EDUCATION/TRAINING PROGRAM

## 2025-08-14 RX ORDER — DIPHENHYDRAMINE HCL 25 MG
25 CAPSULE ORAL EVERY 6 HOURS PRN
Status: DISCONTINUED | OUTPATIENT
Start: 2025-08-14 | End: 2025-08-15 | Stop reason: HOSPADM

## 2025-08-14 RX ADMIN — SERTRALINE HYDROCHLORIDE 50 MG: 50 TABLET ORAL at 08:08

## 2025-08-14 RX ADMIN — CYCLOSPORINE 50 MG: 25 CAPSULE, LIQUID FILLED ORAL at 08:08

## 2025-08-14 RX ADMIN — DIPHENHYDRAMINE HYDROCHLORIDE 25 MG: 25 CAPSULE ORAL at 02:08

## 2025-08-14 RX ADMIN — HYPROMELLOSE 2910 1 DROP: 5 SOLUTION/ DROPS OPHTHALMIC at 01:08

## 2025-08-14 RX ADMIN — DIPHENHYDRAMINE HYDROCHLORIDE 25 MG: 25 CAPSULE ORAL at 12:08

## 2025-08-14 RX ADMIN — TIZANIDINE 4 MG: 4 TABLET ORAL at 08:08

## 2025-08-14 RX ADMIN — OXYCODONE AND ACETAMINOPHEN 1 TABLET: 10; 325 TABLET ORAL at 08:08

## 2025-08-14 RX ADMIN — PREDNISOLONE ACETATE 1 DROP: 10 SUSPENSION/ DROPS OPHTHALMIC at 01:08

## 2025-08-14 RX ADMIN — CARVEDILOL 3.12 MG: 3.12 TABLET, FILM COATED ORAL at 08:08

## 2025-08-14 RX ADMIN — NIFEDIPINE 60 MG: 60 TABLET, FILM COATED, EXTENDED RELEASE ORAL at 08:08

## 2025-08-14 RX ADMIN — DIPHENHYDRAMINE HYDROCHLORIDE 25 MG: 25 CAPSULE ORAL at 08:08

## 2025-08-14 RX ADMIN — CARVEDILOL 3.12 MG: 3.12 TABLET, FILM COATED ORAL at 05:08

## 2025-08-14 RX ADMIN — PREDNISOLONE ACETATE 1 DROP: 10 SUSPENSION/ DROPS OPHTHALMIC at 08:08

## 2025-08-14 RX ADMIN — SODIUM BICARBONATE 650 MG TABLET 650 MG: at 08:08

## 2025-08-14 RX ADMIN — PREDNISOLONE ACETATE 1 DROP: 10 SUSPENSION/ DROPS OPHTHALMIC at 05:08

## 2025-08-14 RX ADMIN — CYCLOSPORINE 50 MG: 25 CAPSULE, LIQUID FILLED ORAL at 05:08

## 2025-08-14 RX ADMIN — ENOXAPARIN SODIUM 30 MG: 30 INJECTION SUBCUTANEOUS at 05:08

## 2025-08-14 RX ADMIN — LEVOTHYROXINE SODIUM 50 MCG: 0.05 TABLET ORAL at 06:08

## 2025-08-14 RX ADMIN — OXYCODONE AND ACETAMINOPHEN 1 TABLET: 10; 325 TABLET ORAL at 02:08

## 2025-08-14 RX ADMIN — SENNOSIDES AND DOCUSATE SODIUM 1 TABLET: 50; 8.6 TABLET ORAL at 08:08

## 2025-08-14 RX ADMIN — ISOSORBIDE MONONITRATE 60 MG: 60 TABLET, EXTENDED RELEASE ORAL at 08:08

## 2025-08-14 RX ADMIN — HYPROMELLOSE 2910 1 DROP: 5 SOLUTION/ DROPS OPHTHALMIC at 05:08

## 2025-08-14 RX ADMIN — PANTOPRAZOLE SODIUM 40 MG: 40 TABLET, DELAYED RELEASE ORAL at 08:08

## 2025-08-14 RX ADMIN — HYPROMELLOSE 2910 1 DROP: 5 SOLUTION/ DROPS OPHTHALMIC at 08:08

## 2025-08-14 RX ADMIN — ATORVASTATIN CALCIUM 20 MG: 10 TABLET, FILM COATED ORAL at 08:08

## 2025-08-15 ENCOUNTER — PATIENT MESSAGE (OUTPATIENT)
Dept: PSYCHIATRY | Facility: HOSPITAL | Age: 70
End: 2025-08-15
Payer: MEDICARE

## 2025-08-15 VITALS
HEART RATE: 86 BPM | RESPIRATION RATE: 18 BRPM | WEIGHT: 151.44 LBS | OXYGEN SATURATION: 95 % | BODY MASS INDEX: 27.87 KG/M2 | DIASTOLIC BLOOD PRESSURE: 66 MMHG | SYSTOLIC BLOOD PRESSURE: 129 MMHG | HEIGHT: 62 IN | TEMPERATURE: 99 F

## 2025-08-15 PROBLEM — R07.9 CHEST PAIN: Chronic | Status: RESOLVED | Noted: 2022-04-05 | Resolved: 2025-08-15

## 2025-08-15 LAB
ABSOLUTE EOSINOPHIL (OHS): 0.11 K/UL
ABSOLUTE MONOCYTE (OHS): 0.56 K/UL (ref 0.3–1)
ABSOLUTE NEUTROPHIL COUNT (OHS): 1.65 K/UL (ref 1.8–7.7)
ANION GAP (OHS): 10 MMOL/L (ref 8–16)
BASOPHILS # BLD AUTO: 0.01 K/UL
BASOPHILS NFR BLD AUTO: 0.3 %
BUN SERPL-MCNC: 36 MG/DL (ref 8–23)
CALCIUM SERPL-MCNC: 8.3 MG/DL (ref 8.7–10.5)
CHLORIDE SERPL-SCNC: 103 MMOL/L (ref 95–110)
CO2 SERPL-SCNC: 26 MMOL/L (ref 23–29)
CREAT SERPL-MCNC: 3.7 MG/DL (ref 0.5–1.4)
ERYTHROCYTE [DISTWIDTH] IN BLOOD BY AUTOMATED COUNT: 15.4 % (ref 11.5–14.5)
GFR SERPLBLD CREATININE-BSD FMLA CKD-EPI: 13 ML/MIN/1.73/M2
GLUCOSE SERPL-MCNC: 75 MG/DL (ref 70–110)
HCT VFR BLD AUTO: 26.4 % (ref 37–48.5)
HGB BLD-MCNC: 8.8 GM/DL (ref 12–16)
IMM GRANULOCYTES # BLD AUTO: 0.02 K/UL (ref 0–0.04)
IMM GRANULOCYTES NFR BLD AUTO: 0.7 % (ref 0–0.5)
LYMPHOCYTES # BLD AUTO: 0.72 K/UL (ref 1–4.8)
MCH RBC QN AUTO: 29.8 PG (ref 27–31)
MCHC RBC AUTO-ENTMCNC: 33.3 G/DL (ref 32–36)
MCV RBC AUTO: 90 FL (ref 82–98)
NUCLEATED RBC (/100WBC) (OHS): 0 /100 WBC
PLATELET # BLD AUTO: 132 K/UL (ref 150–450)
PMV BLD AUTO: 9.7 FL (ref 9.2–12.9)
POTASSIUM SERPL-SCNC: 4.1 MMOL/L (ref 3.5–5.1)
RBC # BLD AUTO: 2.95 M/UL (ref 4–5.4)
RELATIVE EOSINOPHIL (OHS): 3.6 %
RELATIVE LYMPHOCYTE (OHS): 23.5 % (ref 18–48)
RELATIVE MONOCYTE (OHS): 18.2 % (ref 4–15)
RELATIVE NEUTROPHIL (OHS): 53.7 % (ref 38–73)
SODIUM SERPL-SCNC: 139 MMOL/L (ref 136–145)
WBC # BLD AUTO: 3.07 K/UL (ref 3.9–12.7)

## 2025-08-15 PROCEDURE — 36415 COLL VENOUS BLD VENIPUNCTURE: CPT | Mod: HCNC | Performed by: STUDENT IN AN ORGANIZED HEALTH CARE EDUCATION/TRAINING PROGRAM

## 2025-08-15 PROCEDURE — 97116 GAIT TRAINING THERAPY: CPT | Mod: HCNC

## 2025-08-15 PROCEDURE — 25000003 PHARM REV CODE 250: Mod: HCNC | Performed by: STUDENT IN AN ORGANIZED HEALTH CARE EDUCATION/TRAINING PROGRAM

## 2025-08-15 PROCEDURE — 63600175 PHARM REV CODE 636 W HCPCS: Mod: HCNC | Performed by: STUDENT IN AN ORGANIZED HEALTH CARE EDUCATION/TRAINING PROGRAM

## 2025-08-15 PROCEDURE — 80048 BASIC METABOLIC PNL TOTAL CA: CPT | Mod: HCNC | Performed by: STUDENT IN AN ORGANIZED HEALTH CARE EDUCATION/TRAINING PROGRAM

## 2025-08-15 PROCEDURE — 25000003 PHARM REV CODE 250: Mod: HCNC | Performed by: INTERNAL MEDICINE

## 2025-08-15 PROCEDURE — 25000003 PHARM REV CODE 250: Mod: HCNC

## 2025-08-15 PROCEDURE — 85025 COMPLETE CBC W/AUTO DIFF WBC: CPT | Mod: HCNC | Performed by: STUDENT IN AN ORGANIZED HEALTH CARE EDUCATION/TRAINING PROGRAM

## 2025-08-15 PROCEDURE — 25000003 PHARM REV CODE 250: Mod: HCNC | Performed by: NURSE PRACTITIONER

## 2025-08-15 RX ORDER — FUROSEMIDE 20 MG/1
20 TABLET ORAL DAILY
Status: DISCONTINUED | OUTPATIENT
Start: 2025-08-15 | End: 2025-08-15 | Stop reason: HOSPADM

## 2025-08-15 RX ORDER — TIZANIDINE 2 MG/1
4 TABLET ORAL 2 TIMES DAILY PRN
Qty: 20 TABLET | Refills: 0 | Status: SHIPPED | OUTPATIENT
Start: 2025-08-15 | End: 2025-08-20

## 2025-08-15 RX ADMIN — FUROSEMIDE 20 MG: 20 TABLET ORAL at 01:08

## 2025-08-15 RX ADMIN — PANTOPRAZOLE SODIUM 40 MG: 40 TABLET, DELAYED RELEASE ORAL at 08:08

## 2025-08-15 RX ADMIN — CYCLOSPORINE 50 MG: 25 CAPSULE, LIQUID FILLED ORAL at 08:08

## 2025-08-15 RX ADMIN — LEVOTHYROXINE SODIUM 50 MCG: 0.05 TABLET ORAL at 05:08

## 2025-08-15 RX ADMIN — PREDNISOLONE ACETATE 1 DROP: 10 SUSPENSION/ DROPS OPHTHALMIC at 01:08

## 2025-08-15 RX ADMIN — PREDNISOLONE ACETATE 1 DROP: 10 SUSPENSION/ DROPS OPHTHALMIC at 08:08

## 2025-08-15 RX ADMIN — SODIUM BICARBONATE 650 MG TABLET 650 MG: at 08:08

## 2025-08-15 RX ADMIN — DIPHENHYDRAMINE HYDROCHLORIDE 25 MG: 25 CAPSULE ORAL at 03:08

## 2025-08-15 RX ADMIN — TIZANIDINE 4 MG: 4 TABLET ORAL at 08:08

## 2025-08-15 RX ADMIN — ISOSORBIDE MONONITRATE 60 MG: 60 TABLET, EXTENDED RELEASE ORAL at 08:08

## 2025-08-15 RX ADMIN — ATORVASTATIN CALCIUM 20 MG: 10 TABLET, FILM COATED ORAL at 08:08

## 2025-08-15 RX ADMIN — NIFEDIPINE 60 MG: 60 TABLET, FILM COATED, EXTENDED RELEASE ORAL at 08:08

## 2025-08-15 RX ADMIN — CARVEDILOL 3.12 MG: 3.12 TABLET, FILM COATED ORAL at 08:08

## 2025-08-15 RX ADMIN — SERTRALINE HYDROCHLORIDE 50 MG: 50 TABLET ORAL at 08:08

## 2025-08-18 ENCOUNTER — TELEPHONE (OUTPATIENT)
Dept: PRIMARY CARE CLINIC | Facility: CLINIC | Age: 70
End: 2025-08-18
Payer: MEDICARE

## 2025-08-18 ENCOUNTER — PATIENT OUTREACH (OUTPATIENT)
Dept: ADMINISTRATIVE | Facility: CLINIC | Age: 70
End: 2025-08-18
Payer: MEDICARE

## 2025-08-18 RX ORDER — CYCLOSPORINE 50 MG/1
100 CAPSULE, LIQUID FILLED ORAL 2 TIMES DAILY
COMMUNITY

## 2025-08-19 ENCOUNTER — TELEPHONE (OUTPATIENT)
Dept: HEMATOLOGY/ONCOLOGY | Facility: CLINIC | Age: 70
End: 2025-08-19
Payer: MEDICARE

## 2025-08-19 ENCOUNTER — OFFICE VISIT (OUTPATIENT)
Dept: PRIMARY CARE CLINIC | Facility: CLINIC | Age: 70
End: 2025-08-19
Payer: MEDICARE

## 2025-08-19 VITALS
BODY MASS INDEX: 25.48 KG/M2 | WEIGHT: 138.44 LBS | OXYGEN SATURATION: 98 % | HEIGHT: 62 IN | HEART RATE: 94 BPM | TEMPERATURE: 99 F | DIASTOLIC BLOOD PRESSURE: 68 MMHG | SYSTOLIC BLOOD PRESSURE: 126 MMHG

## 2025-08-19 DIAGNOSIS — N18.5 ANEMIA ASSOCIATED WITH STAGE 5 CHRONIC RENAL FAILURE: ICD-10-CM

## 2025-08-19 DIAGNOSIS — H57.89 IRRITATION OF RIGHT EYE: ICD-10-CM

## 2025-08-19 DIAGNOSIS — N30.90 CYSTITIS: ICD-10-CM

## 2025-08-19 DIAGNOSIS — R26.9 GAIT DISORDER: ICD-10-CM

## 2025-08-19 DIAGNOSIS — F33.40 MDD (RECURRENT MAJOR DEPRESSIVE DISORDER) IN REMISSION: Chronic | ICD-10-CM

## 2025-08-19 DIAGNOSIS — D63.1 ANEMIA ASSOCIATED WITH STAGE 5 CHRONIC RENAL FAILURE: ICD-10-CM

## 2025-08-19 DIAGNOSIS — Z79.899 IMMUNOCOMPROMISED STATE DUE TO DRUG THERAPY: ICD-10-CM

## 2025-08-19 DIAGNOSIS — R54 FRAILTY SYNDROME IN GERIATRIC PATIENT: ICD-10-CM

## 2025-08-19 DIAGNOSIS — D84.821 IMMUNOCOMPROMISED STATE DUE TO DRUG THERAPY: ICD-10-CM

## 2025-08-19 DIAGNOSIS — R53.83 OTHER FATIGUE: Primary | ICD-10-CM

## 2025-08-19 PROBLEM — R79.89 ELEVATED TROPONIN: Status: ACTIVE | Noted: 2025-08-19

## 2025-08-19 LAB
ABSOLUTE EOSINOPHIL (OHS): 0.17 K/UL
ABSOLUTE MONOCYTE (OHS): 0.53 K/UL (ref 0.3–1)
ABSOLUTE NEUTROPHIL COUNT (OHS): 1.98 K/UL (ref 1.8–7.7)
ALBUMIN SERPL BCP-MCNC: 3.8 G/DL (ref 3.5–5.2)
ANION GAP (OHS): 15 MMOL/L (ref 8–16)
BACTERIA #/AREA URNS AUTO: NORMAL /HPF
BASOPHILS # BLD AUTO: 0.01 K/UL
BASOPHILS NFR BLD AUTO: 0.3 %
BILIRUB UR QL STRIP.AUTO: NEGATIVE
BUN SERPL-MCNC: 29 MG/DL (ref 8–23)
CALCIUM SERPL-MCNC: 9.7 MG/DL (ref 8.7–10.5)
CHLORIDE SERPL-SCNC: 105 MMOL/L (ref 95–110)
CLARITY UR: CLEAR
CO2 SERPL-SCNC: 21 MMOL/L (ref 23–29)
COLOR UR AUTO: YELLOW
CREAT SERPL-MCNC: 3.8 MG/DL (ref 0.5–1.4)
ERYTHROCYTE [DISTWIDTH] IN BLOOD BY AUTOMATED COUNT: 16.6 % (ref 11.5–14.5)
GFR SERPLBLD CREATININE-BSD FMLA CKD-EPI: 12 ML/MIN/1.73/M2
GLUCOSE SERPL-MCNC: 99 MG/DL (ref 70–110)
GLUCOSE UR QL STRIP: NEGATIVE
HCT VFR BLD AUTO: 30.5 % (ref 37–48.5)
HGB BLD-MCNC: 9.7 GM/DL (ref 12–16)
HGB UR QL STRIP: NEGATIVE
HYALINE CASTS UR QL AUTO: 1 /LPF (ref 0–1)
IMM GRANULOCYTES # BLD AUTO: 0.01 K/UL (ref 0–0.04)
IMM GRANULOCYTES NFR BLD AUTO: 0.3 % (ref 0–0.5)
KETONES UR QL STRIP: NEGATIVE
LEUKOCYTE ESTERASE UR QL STRIP: NEGATIVE
LYMPHOCYTES # BLD AUTO: 0.79 K/UL (ref 1–4.8)
MCH RBC QN AUTO: 29 PG (ref 27–31)
MCHC RBC AUTO-ENTMCNC: 31.8 G/DL (ref 32–36)
MCV RBC AUTO: 91 FL (ref 82–98)
MICROSCOPIC COMMENT: NORMAL
NITRITE UR QL STRIP: NEGATIVE
NUCLEATED RBC (/100WBC) (OHS): 0 /100 WBC
PH UR STRIP: 7 [PH]
PHOSPHATE SERPL-MCNC: 4.4 MG/DL (ref 2.7–4.5)
PLATELET # BLD AUTO: 169 K/UL (ref 150–450)
PMV BLD AUTO: 10.4 FL (ref 9.2–12.9)
POTASSIUM SERPL-SCNC: 5.3 MMOL/L (ref 3.5–5.1)
PROT UR QL STRIP: ABNORMAL
RBC # BLD AUTO: 3.35 M/UL (ref 4–5.4)
RBC #/AREA URNS AUTO: <1 /HPF (ref 0–4)
RELATIVE EOSINOPHIL (OHS): 4.9 %
RELATIVE LYMPHOCYTE (OHS): 22.6 % (ref 18–48)
RELATIVE MONOCYTE (OHS): 15.2 % (ref 4–15)
RELATIVE NEUTROPHIL (OHS): 56.7 % (ref 38–73)
SODIUM SERPL-SCNC: 141 MMOL/L (ref 136–145)
SP GR UR STRIP: 1.01
SQUAMOUS #/AREA URNS AUTO: 2 /HPF
UROBILINOGEN UR STRIP-ACNC: NEGATIVE EU/DL
WBC # BLD AUTO: 3.49 K/UL (ref 3.9–12.7)
WBC #/AREA URNS AUTO: 1 /HPF (ref 0–5)

## 2025-08-19 PROCEDURE — 1160F RVW MEDS BY RX/DR IN RCRD: CPT | Mod: CPTII,HCNC,S$GLB, | Performed by: INTERNAL MEDICINE

## 2025-08-19 PROCEDURE — 1101F PT FALLS ASSESS-DOCD LE1/YR: CPT | Mod: CPTII,HCNC,S$GLB, | Performed by: INTERNAL MEDICINE

## 2025-08-19 PROCEDURE — 82040 ASSAY OF SERUM ALBUMIN: CPT | Mod: HCNC | Performed by: INTERNAL MEDICINE

## 2025-08-19 PROCEDURE — 3074F SYST BP LT 130 MM HG: CPT | Mod: CPTII,HCNC,S$GLB, | Performed by: INTERNAL MEDICINE

## 2025-08-19 PROCEDURE — 1125F AMNT PAIN NOTED PAIN PRSNT: CPT | Mod: CPTII,HCNC,S$GLB, | Performed by: INTERNAL MEDICINE

## 2025-08-19 PROCEDURE — 1111F DSCHRG MED/CURRENT MED MERGE: CPT | Mod: CPTII,HCNC,S$GLB, | Performed by: INTERNAL MEDICINE

## 2025-08-19 PROCEDURE — 99215 OFFICE O/P EST HI 40 MIN: CPT | Mod: HCNC,S$GLB,, | Performed by: INTERNAL MEDICINE

## 2025-08-19 PROCEDURE — 3008F BODY MASS INDEX DOCD: CPT | Mod: CPTII,HCNC,S$GLB, | Performed by: INTERNAL MEDICINE

## 2025-08-19 PROCEDURE — 3288F FALL RISK ASSESSMENT DOCD: CPT | Mod: CPTII,HCNC,S$GLB, | Performed by: INTERNAL MEDICINE

## 2025-08-19 PROCEDURE — 85025 COMPLETE CBC W/AUTO DIFF WBC: CPT | Mod: HCNC | Performed by: INTERNAL MEDICINE

## 2025-08-19 PROCEDURE — 3066F NEPHROPATHY DOC TX: CPT | Mod: CPTII,HCNC,S$GLB, | Performed by: INTERNAL MEDICINE

## 2025-08-19 PROCEDURE — 1159F MED LIST DOCD IN RCRD: CPT | Mod: CPTII,HCNC,S$GLB, | Performed by: INTERNAL MEDICINE

## 2025-08-19 PROCEDURE — 1157F ADVNC CARE PLAN IN RCRD: CPT | Mod: CPTII,HCNC,S$GLB, | Performed by: INTERNAL MEDICINE

## 2025-08-19 PROCEDURE — 99999 PR PBB SHADOW E&M-EST. PATIENT-LVL V: CPT | Mod: PBBFAC,HCNC,, | Performed by: INTERNAL MEDICINE

## 2025-08-19 PROCEDURE — 3078F DIAST BP <80 MM HG: CPT | Mod: CPTII,HCNC,S$GLB, | Performed by: INTERNAL MEDICINE

## 2025-08-19 PROCEDURE — 81003 URINALYSIS AUTO W/O SCOPE: CPT | Mod: HCNC | Performed by: INTERNAL MEDICINE

## 2025-08-19 RX ORDER — SERTRALINE HYDROCHLORIDE 50 MG/1
50 TABLET, FILM COATED ORAL DAILY
Qty: 90 TABLET | Refills: 1 | Status: SHIPPED | OUTPATIENT
Start: 2025-08-19

## 2025-08-20 ENCOUNTER — TELEPHONE (OUTPATIENT)
Dept: ADMINISTRATIVE | Facility: CLINIC | Age: 70
End: 2025-08-20
Payer: MEDICARE

## 2025-08-20 ENCOUNTER — TELEPHONE (OUTPATIENT)
Dept: PRIMARY CARE CLINIC | Facility: CLINIC | Age: 70
End: 2025-08-20
Payer: MEDICARE

## 2025-08-20 ENCOUNTER — E-CONSULT (OUTPATIENT)
Dept: CARDIOLOGY | Facility: CLINIC | Age: 70
End: 2025-08-20
Payer: MEDICARE

## 2025-08-20 DIAGNOSIS — Z01.810 PREOP CARDIOVASCULAR EXAM: Primary | ICD-10-CM

## 2025-08-20 PROBLEM — F33.40 MDD (RECURRENT MAJOR DEPRESSIVE DISORDER) IN REMISSION: Status: ACTIVE | Noted: 2025-05-12

## 2025-08-20 PROBLEM — F33.40 MDD (RECURRENT MAJOR DEPRESSIVE DISORDER) IN REMISSION: Chronic | Status: ACTIVE | Noted: 2025-05-12

## 2025-08-20 PROBLEM — R05.3 CHRONIC COUGH: Chronic | Status: RESOLVED | Noted: 2025-07-22 | Resolved: 2025-08-20

## 2025-08-20 PROBLEM — R54 FRAILTY SYNDROME IN GERIATRIC PATIENT: Chronic | Status: ACTIVE | Noted: 2025-08-19

## 2025-08-20 PROBLEM — R26.9 GAIT DISORDER: Chronic | Status: ACTIVE | Noted: 2025-08-19

## 2025-08-21 ENCOUNTER — PATIENT MESSAGE (OUTPATIENT)
Dept: PAIN MEDICINE | Facility: HOSPITAL | Age: 70
End: 2025-08-21
Payer: MEDICARE

## 2025-08-22 ENCOUNTER — OFFICE VISIT (OUTPATIENT)
Dept: OPHTHALMOLOGY | Facility: CLINIC | Age: 70
End: 2025-08-22
Payer: MEDICARE

## 2025-08-22 ENCOUNTER — NURSE TRIAGE (OUTPATIENT)
Dept: ADMINISTRATIVE | Facility: CLINIC | Age: 70
End: 2025-08-22
Payer: MEDICARE

## 2025-08-22 ENCOUNTER — TELEPHONE (OUTPATIENT)
Dept: PRIMARY CARE CLINIC | Facility: CLINIC | Age: 70
End: 2025-08-22
Payer: MEDICARE

## 2025-08-22 DIAGNOSIS — Z98.890 POST-OPERATIVE STATE: Primary | ICD-10-CM

## 2025-08-22 DIAGNOSIS — H20.00 ACUTE IRITIS, RIGHT EYE: ICD-10-CM

## 2025-08-22 DIAGNOSIS — Z98.41 CATARACT EXTRACTION STATUS OF EYE, RIGHT: ICD-10-CM

## 2025-08-22 DIAGNOSIS — H25.012 CORTICAL SENILE CATARACT OF LEFT EYE: ICD-10-CM

## 2025-08-22 PROCEDURE — 99999 PR PBB SHADOW E&M-EST. PATIENT-LVL II: CPT | Mod: PBBFAC,HCNC,, | Performed by: OPHTHALMOLOGY

## 2025-08-22 RX ORDER — TRIAMCINOLONE ACETONIDE 10 MG/ML
10 INJECTION, SUSPENSION INTRA-ARTICULAR; INTRALESIONAL
Status: COMPLETED | OUTPATIENT
Start: 2025-08-22 | End: 2025-08-22

## 2025-08-22 RX ADMIN — TRIAMCINOLONE ACETONIDE 10 MG: 10 INJECTION, SUSPENSION INTRA-ARTICULAR; INTRALESIONAL at 10:08

## 2025-08-25 ENCOUNTER — EXTERNAL HOME HEALTH (OUTPATIENT)
Dept: HOME HEALTH SERVICES | Facility: HOSPITAL | Age: 70
End: 2025-08-25
Payer: MEDICARE

## 2025-08-25 ENCOUNTER — LAB VISIT (OUTPATIENT)
Dept: LAB | Facility: HOSPITAL | Age: 70
End: 2025-08-25
Attending: INTERNAL MEDICINE
Payer: MEDICARE

## 2025-08-25 DIAGNOSIS — N18.4 STAGE 4 CHRONIC KIDNEY DISEASE: ICD-10-CM

## 2025-08-25 DIAGNOSIS — N18.5 ANEMIA ASSOCIATED WITH STAGE 5 CHRONIC RENAL FAILURE: ICD-10-CM

## 2025-08-25 DIAGNOSIS — N17.9 AKI (ACUTE KIDNEY INJURY): ICD-10-CM

## 2025-08-25 DIAGNOSIS — I10 PRIMARY HYPERTENSION: ICD-10-CM

## 2025-08-25 DIAGNOSIS — D63.1 ANEMIA ASSOCIATED WITH STAGE 5 CHRONIC RENAL FAILURE: ICD-10-CM

## 2025-08-25 DIAGNOSIS — N25.81 SECONDARY HYPERPARATHYROIDISM OF RENAL ORIGIN: ICD-10-CM

## 2025-08-25 DIAGNOSIS — R80.1 PERSISTENT PROTEINURIA: ICD-10-CM

## 2025-08-25 PROBLEM — Z01.810 PREOP CARDIOVASCULAR EXAM: Status: RESOLVED | Noted: 2025-08-20 | Resolved: 2025-08-25

## 2025-08-25 LAB
25(OH)D3+25(OH)D2 SERPL-MCNC: 50 NG/ML (ref 30–96)
ABSOLUTE EOSINOPHIL (OHS): 0.11 K/UL
ABSOLUTE MONOCYTE (OHS): 0.45 K/UL (ref 0.3–1)
ABSOLUTE NEUTROPHIL COUNT (OHS): 1.49 K/UL (ref 1.8–7.7)
ALBUMIN SERPL BCP-MCNC: 3.7 G/DL (ref 3.5–5.2)
ANION GAP (OHS): 12 MMOL/L (ref 8–16)
ANION GAP (OHS): 14 MMOL/L (ref 8–16)
BASOPHILS # BLD AUTO: 0.01 K/UL
BASOPHILS NFR BLD AUTO: 0.4 %
BUN SERPL-MCNC: 29 MG/DL (ref 8–23)
BUN SERPL-MCNC: 30 MG/DL (ref 8–23)
CALCIUM SERPL-MCNC: 9.3 MG/DL (ref 8.7–10.5)
CALCIUM SERPL-MCNC: 9.4 MG/DL (ref 8.7–10.5)
CHLORIDE SERPL-SCNC: 105 MMOL/L (ref 95–110)
CHLORIDE SERPL-SCNC: 106 MMOL/L (ref 95–110)
CO2 SERPL-SCNC: 22 MMOL/L (ref 23–29)
CO2 SERPL-SCNC: 24 MMOL/L (ref 23–29)
CREAT SERPL-MCNC: 2.8 MG/DL (ref 0.5–1.4)
CREAT SERPL-MCNC: 3 MG/DL (ref 0.5–1.4)
CREAT UR-MCNC: 118 MG/DL (ref 15–325)
ERYTHROCYTE [DISTWIDTH] IN BLOOD BY AUTOMATED COUNT: 15.3 % (ref 11.5–14.5)
FERRITIN SERPL-MCNC: 83 NG/ML (ref 20–300)
GFR SERPLBLD CREATININE-BSD FMLA CKD-EPI: 16 ML/MIN/1.73/M2
GFR SERPLBLD CREATININE-BSD FMLA CKD-EPI: 18 ML/MIN/1.73/M2
GLUCOSE SERPL-MCNC: 126 MG/DL (ref 70–110)
GLUCOSE SERPL-MCNC: 131 MG/DL (ref 70–110)
HCT VFR BLD AUTO: 30.6 % (ref 37–48.5)
HGB BLD-MCNC: 10.1 GM/DL (ref 12–16)
IMM GRANULOCYTES # BLD AUTO: 0.02 K/UL (ref 0–0.04)
IMM GRANULOCYTES NFR BLD AUTO: 0.7 % (ref 0–0.5)
IRON SATN MFR SERPL: 20 % (ref 20–50)
IRON SERPL-MCNC: 68 UG/DL (ref 30–160)
LYMPHOCYTES # BLD AUTO: 0.77 K/UL (ref 1–4.8)
MCH RBC QN AUTO: 28.9 PG (ref 27–31)
MCHC RBC AUTO-ENTMCNC: 33 G/DL (ref 32–36)
MCV RBC AUTO: 88 FL (ref 82–98)
NUCLEATED RBC (/100WBC) (OHS): 0 /100 WBC
PHOSPHATE SERPL-MCNC: 2.9 MG/DL (ref 2.7–4.5)
PLATELET # BLD AUTO: 184 K/UL (ref 150–450)
PMV BLD AUTO: 9 FL (ref 9.2–12.9)
POTASSIUM SERPL-SCNC: 4 MMOL/L (ref 3.5–5.1)
POTASSIUM SERPL-SCNC: 4.1 MMOL/L (ref 3.5–5.1)
PROT UR-MCNC: 117 MG/DL
PROT/CREAT UR: 0.99 MG/G{CREAT}
PTH-INTACT SERPL-MCNC: 159.5 PG/ML (ref 9–77)
RBC # BLD AUTO: 3.49 M/UL (ref 4–5.4)
RELATIVE EOSINOPHIL (OHS): 3.9 %
RELATIVE LYMPHOCYTE (OHS): 27 % (ref 18–48)
RELATIVE MONOCYTE (OHS): 15.8 % (ref 4–15)
RELATIVE NEUTROPHIL (OHS): 52.2 % (ref 38–73)
SODIUM SERPL-SCNC: 141 MMOL/L (ref 136–145)
SODIUM SERPL-SCNC: 142 MMOL/L (ref 136–145)
TIBC SERPL-MCNC: 343 UG/DL (ref 250–450)
TRANSFERRIN SERPL-MCNC: 232 MG/DL (ref 200–375)
WBC # BLD AUTO: 2.85 K/UL (ref 3.9–12.7)

## 2025-08-25 PROCEDURE — 82728 ASSAY OF FERRITIN: CPT | Mod: HCNC

## 2025-08-25 PROCEDURE — 82040 ASSAY OF SERUM ALBUMIN: CPT | Mod: HCNC

## 2025-08-25 PROCEDURE — 83970 ASSAY OF PARATHORMONE: CPT | Mod: HCNC

## 2025-08-25 PROCEDURE — 84156 ASSAY OF PROTEIN URINE: CPT | Mod: HCNC

## 2025-08-25 PROCEDURE — 82306 VITAMIN D 25 HYDROXY: CPT | Mod: HCNC

## 2025-08-25 PROCEDURE — 80048 BASIC METABOLIC PNL TOTAL CA: CPT | Mod: HCNC

## 2025-08-25 PROCEDURE — 36415 COLL VENOUS BLD VENIPUNCTURE: CPT | Mod: HCNC

## 2025-08-25 PROCEDURE — 83540 ASSAY OF IRON: CPT | Mod: HCNC

## 2025-08-25 PROCEDURE — 85025 COMPLETE CBC W/AUTO DIFF WBC: CPT | Mod: HCNC

## 2025-08-26 ENCOUNTER — EXTERNAL HOME HEALTH (OUTPATIENT)
Dept: HOME HEALTH SERVICES | Facility: HOSPITAL | Age: 70
End: 2025-08-26
Payer: MEDICARE

## 2025-08-26 ENCOUNTER — PATIENT MESSAGE (OUTPATIENT)
Dept: PAIN MEDICINE | Facility: HOSPITAL | Age: 70
End: 2025-08-26
Payer: MEDICARE

## 2025-08-27 ENCOUNTER — TELEPHONE (OUTPATIENT)
Dept: OPHTHALMOLOGY | Facility: CLINIC | Age: 70
End: 2025-08-27
Payer: MEDICARE

## 2025-08-28 ENCOUNTER — PATIENT MESSAGE (OUTPATIENT)
Dept: PAIN MEDICINE | Facility: HOSPITAL | Age: 70
End: 2025-08-28
Payer: MEDICARE

## 2025-09-02 ENCOUNTER — HOSPITAL ENCOUNTER (OUTPATIENT)
Facility: HOSPITAL | Age: 70
Discharge: HOME | End: 2025-09-02
Attending: PHYSICAL MEDICINE & REHABILITATION | Admitting: PHYSICAL MEDICINE & REHABILITATION
Payer: MEDICARE

## 2025-09-02 VITALS
WEIGHT: 127.63 LBS | HEART RATE: 86 BPM | SYSTOLIC BLOOD PRESSURE: 113 MMHG | RESPIRATION RATE: 18 BRPM | OXYGEN SATURATION: 98 % | TEMPERATURE: 97 F | HEIGHT: 60 IN | BODY MASS INDEX: 25.06 KG/M2 | DIASTOLIC BLOOD PRESSURE: 65 MMHG

## 2025-09-02 DIAGNOSIS — M54.14 THORACIC RADICULOPATHY: Primary | ICD-10-CM

## 2025-09-02 DIAGNOSIS — G89.4 CHRONIC PAIN SYNDROME: ICD-10-CM

## 2025-09-02 PROCEDURE — 62321 NJX INTERLAMINAR CRV/THRC: CPT | Mod: HCNC,,, | Performed by: PHYSICAL MEDICINE & REHABILITATION

## 2025-09-02 PROCEDURE — 63600175 PHARM REV CODE 636 W HCPCS: Mod: HCNC | Performed by: PHYSICAL MEDICINE & REHABILITATION

## 2025-09-02 PROCEDURE — 25500020 PHARM REV CODE 255: Mod: HCNC | Performed by: PHYSICAL MEDICINE & REHABILITATION

## 2025-09-02 PROCEDURE — 62321 NJX INTERLAMINAR CRV/THRC: CPT | Mod: HCNC | Performed by: PHYSICAL MEDICINE & REHABILITATION

## 2025-09-02 RX ORDER — MIDAZOLAM HYDROCHLORIDE 1 MG/ML
INJECTION, SOLUTION INTRAMUSCULAR; INTRAVENOUS
Status: DISCONTINUED | OUTPATIENT
Start: 2025-09-02 | End: 2025-09-02 | Stop reason: HOSPADM

## 2025-09-02 RX ORDER — BUPIVACAINE HYDROCHLORIDE 2.5 MG/ML
INJECTION, SOLUTION EPIDURAL; INFILTRATION; INTRACAUDAL; PERINEURAL
Status: DISCONTINUED | OUTPATIENT
Start: 2025-09-02 | End: 2025-09-02 | Stop reason: HOSPADM

## 2025-09-02 RX ORDER — ONDANSETRON HYDROCHLORIDE 2 MG/ML
4 INJECTION, SOLUTION INTRAVENOUS ONCE AS NEEDED
Status: COMPLETED | OUTPATIENT
Start: 2025-09-02 | End: 2025-09-02

## 2025-09-02 RX ORDER — FENTANYL CITRATE 50 UG/ML
INJECTION, SOLUTION INTRAMUSCULAR; INTRAVENOUS
Status: DISCONTINUED | OUTPATIENT
Start: 2025-09-02 | End: 2025-09-02 | Stop reason: HOSPADM

## 2025-09-02 RX ORDER — METHYLPREDNISOLONE ACETATE 80 MG/ML
INJECTION, SUSPENSION INTRA-ARTICULAR; INTRALESIONAL; INTRAMUSCULAR; SOFT TISSUE
Status: DISCONTINUED | OUTPATIENT
Start: 2025-09-02 | End: 2025-09-02 | Stop reason: HOSPADM

## 2025-09-02 RX ADMIN — ONDANSETRON 4 MG: 2 INJECTION INTRAMUSCULAR; INTRAVENOUS at 12:09

## 2025-09-03 ENCOUNTER — OFFICE VISIT (OUTPATIENT)
Dept: NEPHROLOGY | Facility: CLINIC | Age: 70
End: 2025-09-03
Payer: MEDICARE

## 2025-09-03 VITALS
WEIGHT: 133.38 LBS | HEIGHT: 62 IN | BODY MASS INDEX: 24.54 KG/M2 | SYSTOLIC BLOOD PRESSURE: 125 MMHG | HEART RATE: 70 BPM | DIASTOLIC BLOOD PRESSURE: 60 MMHG

## 2025-09-03 DIAGNOSIS — N17.9 AKI (ACUTE KIDNEY INJURY): ICD-10-CM

## 2025-09-03 DIAGNOSIS — N18.4 STAGE 4 CHRONIC KIDNEY DISEASE: Primary | ICD-10-CM

## 2025-09-03 DIAGNOSIS — I10 PRIMARY HYPERTENSION: ICD-10-CM

## 2025-09-03 DIAGNOSIS — F51.01 PRIMARY INSOMNIA: ICD-10-CM

## 2025-09-03 DIAGNOSIS — R80.1 PERSISTENT PROTEINURIA: ICD-10-CM

## 2025-09-03 DIAGNOSIS — N25.81 SECONDARY HYPERPARATHYROIDISM OF RENAL ORIGIN: ICD-10-CM

## 2025-09-03 PROCEDURE — 1159F MED LIST DOCD IN RCRD: CPT | Mod: CPTII,HCNC,S$GLB, | Performed by: INTERNAL MEDICINE

## 2025-09-03 PROCEDURE — 1125F AMNT PAIN NOTED PAIN PRSNT: CPT | Mod: CPTII,HCNC,S$GLB, | Performed by: INTERNAL MEDICINE

## 2025-09-03 PROCEDURE — 3008F BODY MASS INDEX DOCD: CPT | Mod: CPTII,HCNC,S$GLB, | Performed by: INTERNAL MEDICINE

## 2025-09-03 PROCEDURE — 99215 OFFICE O/P EST HI 40 MIN: CPT | Mod: HCNC,S$GLB,, | Performed by: INTERNAL MEDICINE

## 2025-09-03 PROCEDURE — 1111F DSCHRG MED/CURRENT MED MERGE: CPT | Mod: CPTII,HCNC,S$GLB, | Performed by: INTERNAL MEDICINE

## 2025-09-03 PROCEDURE — 1157F ADVNC CARE PLAN IN RCRD: CPT | Mod: CPTII,HCNC,S$GLB, | Performed by: INTERNAL MEDICINE

## 2025-09-03 PROCEDURE — 3074F SYST BP LT 130 MM HG: CPT | Mod: CPTII,HCNC,S$GLB, | Performed by: INTERNAL MEDICINE

## 2025-09-03 PROCEDURE — 1160F RVW MEDS BY RX/DR IN RCRD: CPT | Mod: CPTII,HCNC,S$GLB, | Performed by: INTERNAL MEDICINE

## 2025-09-03 PROCEDURE — 3066F NEPHROPATHY DOC TX: CPT | Mod: CPTII,HCNC,S$GLB, | Performed by: INTERNAL MEDICINE

## 2025-09-03 PROCEDURE — 3078F DIAST BP <80 MM HG: CPT | Mod: CPTII,HCNC,S$GLB, | Performed by: INTERNAL MEDICINE

## 2025-09-03 PROCEDURE — 99999 PR PBB SHADOW E&M-EST. PATIENT-LVL IV: CPT | Mod: PBBFAC,HCNC,, | Performed by: INTERNAL MEDICINE

## 2025-09-04 ENCOUNTER — TELEPHONE (OUTPATIENT)
Dept: OPHTHALMOLOGY | Facility: CLINIC | Age: 70
End: 2025-09-04
Payer: MEDICARE

## 2025-09-04 ENCOUNTER — OFFICE VISIT (OUTPATIENT)
Dept: OPHTHALMOLOGY | Facility: CLINIC | Age: 70
End: 2025-09-04
Payer: MEDICARE

## 2025-09-04 DIAGNOSIS — Z96.1 PSEUDOPHAKIA, RIGHT EYE: ICD-10-CM

## 2025-09-04 DIAGNOSIS — H25.012 CORTICAL SENILE CATARACT OF LEFT EYE: ICD-10-CM

## 2025-09-04 DIAGNOSIS — Z98.890 POST-OPERATIVE STATE: Primary | ICD-10-CM

## 2025-09-04 PROCEDURE — 99999 PR PBB SHADOW E&M-EST. PATIENT-LVL III: CPT | Mod: PBBFAC,HCNC,, | Performed by: OPTOMETRIST

## 2025-09-05 ENCOUNTER — TELEPHONE (OUTPATIENT)
Dept: PRIMARY CARE CLINIC | Facility: CLINIC | Age: 70
End: 2025-09-05
Payer: MEDICARE

## 2025-09-05 RX ORDER — TEMAZEPAM 30 MG/1
30 CAPSULE ORAL NIGHTLY PRN
Qty: 30 CAPSULE | Refills: 1 | Status: SHIPPED | OUTPATIENT
Start: 2025-09-05

## (undated) DEVICE — DECANTER VIAL ASEPTIC TRANSFER

## (undated) DEVICE — GOWN POLY REINF BRTH SLV XL

## (undated) DEVICE — SUT VICRYL 3-0 27 SH

## (undated) DEVICE — CANISTER SUCTION JUMBO 12L

## (undated) DEVICE — SYR 10CC LUER LOCK

## (undated) DEVICE — DRAPE LAVH SURG 109X109X100IN

## (undated) DEVICE — DRAPE T TRNSVRS LAP 102X78X121

## (undated) DEVICE — NDL SAFETY 25G X 1.5 ECLIPSE

## (undated) DEVICE — EVACUATOR PENCIL SMOKE NEPTUNE

## (undated) DEVICE — SUT MONOCRYL 4-0 PS-1 UND

## (undated) DEVICE — COVER PROBE NL STRL 3.6X96IN

## (undated) DEVICE — SEE MEDLINE ITEM 157137

## (undated) DEVICE — SUT VICRYL CTD 2-0 GI 27 SH

## (undated) DEVICE — SEE MEDLINE ITEM 152530

## (undated) DEVICE — ELECTRODE REM PLYHSV RETURN 9

## (undated) DEVICE — CONTAINER SPECIMEN STRL 4OZ

## (undated) DEVICE — COVER PROXIMA MAYO STAND

## (undated) DEVICE — DRAPE UINDERBUT GRAD PCH

## (undated) DEVICE — DRAPE MOBILE C-ARM

## (undated) DEVICE — GLOVE SURGICAL LATEX SZ 7

## (undated) DEVICE — SUT CTD VICRYL 3-0 UND SUTU

## (undated) DEVICE — NDL ECLIPSE SAFETY 18GX1-1/2IN

## (undated) DEVICE — DRAPE INVISISHIELD TWL 18X24IN

## (undated) DEVICE — SHEET THYROID W/ISO-BAC

## (undated) DEVICE — POSITIONER HEAD DONUT 9IN FOAM

## (undated) DEVICE — BAG TISSUE RETRIEVAL 5MM

## (undated) DEVICE — SUT VICRYL PLUS 0 CT1 36IN

## (undated) DEVICE — CLOSURE SKIN STERI STRIP 1/2X4

## (undated) DEVICE — TOWEL OR DISP STRL BLUE 4/PK

## (undated) DEVICE — DRESSING TRANS 4X4 TEGADERM

## (undated) DEVICE — COVER OVERHEAD SURG LT BLUE

## (undated) DEVICE — GAUZE SPONGE 4X4 12PLY

## (undated) DEVICE — SEE MEDLINE ITEM 157027

## (undated) DEVICE — SUPPORT ULNA NERVE PROTECTOR

## (undated) DEVICE — NDL PNEUMOPERITONEUM 150MM

## (undated) DEVICE — APPLICATOR CHLORAPREP ORN 26ML

## (undated) DEVICE — ADHESIVE DERMABOND ADVANCED

## (undated) DEVICE — GLOVE SIGNATURE ESSNTL LTX 7.5

## (undated) DEVICE — SOL 9P NACL IRR PIC IL

## (undated) DEVICE — SOL ELECTROLUBE ANTI-STIC

## (undated) DEVICE — UNDERGLOVES BIOGEL PI SZ 7 LF

## (undated) DEVICE — BRA MAMMARY SUPPORT LARGE

## (undated) DEVICE — GLOVE SURG BIOGEL LATEX SZ 7.5

## (undated) DEVICE — MANIFOLD 4 PORT

## (undated) DEVICE — SEAL UNIVERSAL 5MM-8MM XI

## (undated) DEVICE — TRAY CATH FOL SIL URIMTR 16FR

## (undated) DEVICE — TUBING MEDI-VAC 20FT .25IN

## (undated) DEVICE — SUT SILK 2-0 STRANDS 30IN

## (undated) DEVICE — DRAPE ARM DAVINCI XI

## (undated) DEVICE — SHEATH GUIDE SCOUT SURG RADAR

## (undated) DEVICE — SEE MEDLINE ITEM 152622

## (undated) DEVICE — NDL SAFETY 22G X 1.5 ECLIPSE

## (undated) DEVICE — COVER LIGHT HANDLE 80/CA

## (undated) DEVICE — IRRIGATOR ENDOSCOPY DISP.

## (undated) DEVICE — SEE MEDLINE ITEM 146292

## (undated) DEVICE — SYR 3CC LUER LOC

## (undated) DEVICE — SUT GORETEX CV-2 TH-26

## (undated) DEVICE — SPONGE GAUZE 16PLY 4X4

## (undated) DEVICE — NDL HYPODERMIC BLUNT 18G 1.5IN

## (undated) DEVICE — SUT CTD VICRYL 2-0 UND SUTU

## (undated) DEVICE — SUT MONOCRYL 4-0 PS-2

## (undated) DEVICE — ADHESIVE MASTISOL VIAL 48/BX

## (undated) DEVICE — TUBING SUCTION STRAIGHT .25X20

## (undated) DEVICE — SUT MONOCRYL 4.0 PS2 CP496G

## (undated) DEVICE — KIT ANTIFOG W/SPONG & FLUID

## (undated) DEVICE — PACK BASIC SETUP SC BR

## (undated) DEVICE — COVER CAMERA OPERATING ROOM

## (undated) DEVICE — OBTURATOR BLADELESS 8MM XI CLR

## (undated) DEVICE — CONTAINER SPECIMEN OR STER 4OZ

## (undated) DEVICE — COVER TIP CURVED SCISSORS XI

## (undated) DEVICE — HEADREST PRONESAFE DERMAPROX

## (undated) DEVICE — INSERT CUSHIONPRONE VIEW LARGE

## (undated) DEVICE — NDL PNEUMO INSUFFLATI 120MM

## (undated) DEVICE — SUT VICRYL PLUS 3-0 SH 18IN

## (undated) DEVICE — DRAPE STERI LONG

## (undated) DEVICE — APPLIER CLIP LIAGCLIP 9.375IN

## (undated) DEVICE — Device

## (undated) DEVICE — SUT VICRYL 2-0 27 CT-1

## (undated) DEVICE — SOL IRRI STRL WATER 1000ML

## (undated) DEVICE — SOL IRR NACL .9% 3000ML

## (undated) DEVICE — DRAPE COLUMN DAVINCI XI

## (undated) DEVICE — SOL NORMAL USPCA 0.9%

## (undated) DEVICE — SEE MEDLINE ITEM 157216

## (undated) DEVICE — SUT 2/0 36IN ETHIBOND EXCE

## (undated) DEVICE — SUT VICRYL ANTIMICRO VIL BR

## (undated) DEVICE — IRRIGATOR ENDOWRIST XI SUCTION

## (undated) DEVICE — HEADREST ROUND DISP FOAM 9IN

## (undated) DEVICE — DRAPE C-ARMOR EQUIPMENT COVER

## (undated) DEVICE — SUT MCRYL PLUS 4-0 PS2 27IN

## (undated) DEVICE — TRAY SKIN SCRUB WET PREMIUM

## (undated) DEVICE — SEE MEDLINE ITEM 157117

## (undated) DEVICE — COVER PROBE NEOGUARD 1X11.8IN

## (undated) DEVICE — HANDSET INTERSTIM X COMM

## (undated) DEVICE — SUT V-LOC 180 ABD 2/0 GS-21